# Patient Record
Sex: FEMALE | Race: WHITE | NOT HISPANIC OR LATINO | Employment: FULL TIME | URBAN - METROPOLITAN AREA
[De-identification: names, ages, dates, MRNs, and addresses within clinical notes are randomized per-mention and may not be internally consistent; named-entity substitution may affect disease eponyms.]

---

## 2017-01-27 ENCOUNTER — OFFICE VISIT (OUTPATIENT)
Dept: FAMILY MEDICINE CLINIC | Facility: CLINIC | Age: 37
End: 2017-01-27

## 2017-01-27 VITALS
TEMPERATURE: 98 F | HEART RATE: 75 BPM | DIASTOLIC BLOOD PRESSURE: 85 MMHG | HEIGHT: 66 IN | BODY MASS INDEX: 38.31 KG/M2 | WEIGHT: 238.4 LBS | OXYGEN SATURATION: 99 % | SYSTOLIC BLOOD PRESSURE: 148 MMHG

## 2017-01-27 DIAGNOSIS — K58.0 IRRITABLE BOWEL SYNDROME WITH DIARRHEA: Primary | ICD-10-CM

## 2017-01-27 DIAGNOSIS — F41.9 ANXIETY: ICD-10-CM

## 2017-01-27 PROCEDURE — 99213 OFFICE O/P EST LOW 20 MIN: CPT | Performed by: FAMILY MEDICINE

## 2017-01-27 NOTE — MR AVS SNAPSHOT
Carole Weaver   1/27/2017 11:50 AM   Office Visit    Dept Phone:  [phone]   Encounter #:  [id number]    Provider:  Minesh Leone MD   Department:  [facility]                Your Full Care Plan              Today's Medication Changes          These changes are accurate as of: 1/27/17  1:34 PM.  If you have any questions, ask your nurse or doctor.               Stop taking medication(s)listed here:     metroNIDAZOLE 500 MG tablet   Commonly known as:  FLAGYL   Stopped by:  Minesh Leone MD                      Your Updated Medication List          This list is accurate as of: 1/27/17  1:34 PM.  Always use your most recent med list.                buPROPion  MG 24 hr tablet   Commonly known as:  WELLBUTRIN XL   Take 1 tablet by mouth daily.       clonazePAM 0.5 MG tablet   Commonly known as:  KLONOPIN   Take 1 tablet by mouth 2 (two) times a day as needed for seizures.       dicyclomine 20 MG tablet   Commonly known as:  BENTYL   Take 1 tablet by mouth every 6 (six) hours.       escitalopram 10 MG tablet   Commonly known as:  LEXAPRO   TAKE ONE TABLET BY MOUTH DAILY               We Performed the Following     Ambulatory Referral to Gastroenterology     Ambulatory Referral to Gastroenterology       You Were Diagnosed With        Codes Comments    Irritable bowel syndrome with diarrhea    -  Primary ICD-10-CM: K58.0  ICD-9-CM: 564.1     Anxiety     ICD-10-CM: F41.9  ICD-9-CM: 300.00       Instructions     None    Patient Instructions History      Upcoming Appointments     Visit Type Date Time Department    FOLLOW UP 1/27/2017 11:50 AM [facility]    FOLLOW UP 5/5/2017  1:30 PM [facility]INOD [facility]iggins Signup     [facility] [employer] allows you to send messages to your doctor, view your test results, renew your prescriptions, schedule appointments, and more. To sign up, go to [employer] and click on the  "Sign Up Now link in the New User? box. Enter your [employer] Activation Code exactly as it appears below along with the last four digits of your Social Security Number and your Date of Birth () to complete the sign-up process. If you do not sign up before the expiration date, you must request a new code.    [employer] Activation Code: [email or web address]  Expires: 2/10/2017  1:34 PM    If you have questions, you can email [email or web address] or call [phone] to talk to our [employer] staff. Remember, [employer] is NOT to be used for urgent needs. For medical emergencies, dial 911.               Other Info from Your Visit           Your Appointments     May 05, 2017  1:30 PM EDT   Follow Up with Minesh Leone MD   [facility] MED[facility] MEDICINE (--)    [address] [place] [id number] [phone]           Arrive 15 minutes prior to appointment.              Allergies     No Known Allergies      Reason for Visit     Med Refill -happy with Lexapro-still use Klonopin prn    Irritable Bowel Syndrome     Rectal Bleeding every BM with blood and mucous      Vital Signs     Blood Pressure Pulse Temperature Height Weight Oxygen Saturation    148/85 (BP Location: Left arm, Patient Position: Sitting, Cuff Size: Large Adult) 75 98 °F (36.7 °C) (Oral) 66\" (167.6 cm) 238 lb 6.4 oz (108 kg) 99%    Body Mass Index Smoking Status                38.48 kg/m2 Former Smoker          Problems and Diagnoses Noted     Anxiety problem    Spasmodic colon        "

## 2017-01-27 NOTE — PROGRESS NOTES
Chief Complaint and HPI    I have reviewed the patient's medical history in detail and updated the computerized patient record.    Subjective: Carole Weaver is a 36 y.o. female presents   here today for     Med Refill (-happy with Lexapro-still use Klonopin prn); Irritable Bowel Syndrome; and Rectal Bleeding (every BM with blood and mucous)      Review of Systems   Constitutional: Negative.    HENT: Negative.    Eyes: Negative.    Respiratory: Negative.    Cardiovascular: Negative.    Gastrointestinal: Negative.    Endocrine: Negative.    Genitourinary: Negative.    Musculoskeletal: Negative.    Skin: Negative.    Allergic/Immunologic: Negative.    Neurological: Negative.    Hematological: Negative.    Psychiatric/Behavioral: Negative.        Physical Exam   Constitutional: She is oriented to person, place, and time. She appears well-developed and well-nourished.   Cardiovascular: Normal rate, regular rhythm, normal heart sounds and intact distal pulses.    Pulmonary/Chest: Effort normal.   Neurological: She is alert and oriented to person, place, and time.   Nursing note and vitals reviewed.      Procedures    Assessment:   Diagnosis Plan   1. Irritable bowel syndrome with diarrhea  Ambulatory Referral to Gastroenterology    Ambulatory Referral to Gastroenterology   2. Anxiety         Plan:  Orders Placed This Encounter   Procedures   • Ambulatory Referral to Gastroenterology     Referral Priority:   Routine     Referral Type:   Consultation     Referral Reason:   Specialty Services Required     Requested Specialty:   Gastroenterology     Number of Visits Requested:   1   • Ambulatory Referral to Gastroenterology     Referral Priority:   Routine     Referral Type:   Consultation     Referral Reason:   Specialty Services Required     Referred to Provider:   Roland Thorpe MD     Requested Specialty:   Gastroenterology     Number of Visits Requested:   1       Requested Prescriptions      No prescriptions  requested or ordered in this encounter       All tests and consults since last visit reviewed with patient    Return in about 3 months (around 4/27/2017) for Recheck.

## 2017-02-07 RX ORDER — BUPROPION HYDROCHLORIDE 150 MG/1
TABLET ORAL
Qty: 30 TABLET | Refills: 2 | Status: SHIPPED | OUTPATIENT
Start: 2017-02-07 | End: 2017-05-05 | Stop reason: SDUPTHER

## 2017-03-02 ENCOUNTER — OFFICE (OUTPATIENT)
Dept: URBAN - METROPOLITAN AREA OTHER 6 | Facility: OTHER | Age: 37
End: 2017-03-02
Payer: COMMERCIAL

## 2017-03-02 VITALS
SYSTOLIC BLOOD PRESSURE: 158 MMHG | HEART RATE: 96 BPM | WEIGHT: 242 LBS | HEIGHT: 66 IN | DIASTOLIC BLOOD PRESSURE: 84 MMHG

## 2017-03-02 DIAGNOSIS — Z83.71 FAMILY HISTORY OF COLONIC POLYPS: ICD-10-CM

## 2017-03-02 DIAGNOSIS — K58.9 IRRITABLE BOWEL SYNDROME WITHOUT DIARRHEA: ICD-10-CM

## 2017-03-02 PROCEDURE — 99202 OFFICE O/P NEW SF 15 MIN: CPT | Performed by: INTERNAL MEDICINE

## 2017-04-10 RX ORDER — ESCITALOPRAM OXALATE 10 MG/1
10 TABLET ORAL DAILY
Qty: 30 TABLET | Refills: 2 | Status: SHIPPED | OUTPATIENT
Start: 2017-04-10 | End: 2017-04-10 | Stop reason: SDUPTHER

## 2017-04-10 RX ORDER — ESCITALOPRAM OXALATE 10 MG/1
10 TABLET ORAL DAILY
Qty: 30 TABLET | Refills: 0 | Status: SHIPPED | OUTPATIENT
Start: 2017-04-10 | End: 2017-05-05 | Stop reason: SDUPTHER

## 2017-05-05 ENCOUNTER — OFFICE VISIT (OUTPATIENT)
Dept: FAMILY MEDICINE CLINIC | Facility: CLINIC | Age: 37
End: 2017-05-05

## 2017-05-05 VITALS
WEIGHT: 249.6 LBS | TEMPERATURE: 98.3 F | DIASTOLIC BLOOD PRESSURE: 84 MMHG | HEART RATE: 92 BPM | SYSTOLIC BLOOD PRESSURE: 148 MMHG | HEIGHT: 66 IN | OXYGEN SATURATION: 98 % | BODY MASS INDEX: 40.11 KG/M2

## 2017-05-05 DIAGNOSIS — F41.9 ANXIETY: ICD-10-CM

## 2017-05-05 DIAGNOSIS — K58.0 IRRITABLE BOWEL SYNDROME WITH DIARRHEA: Primary | ICD-10-CM

## 2017-05-05 PROCEDURE — 99213 OFFICE O/P EST LOW 20 MIN: CPT | Performed by: FAMILY MEDICINE

## 2017-05-05 RX ORDER — ESCITALOPRAM OXALATE 10 MG/1
10 TABLET ORAL DAILY
Qty: 30 TABLET | Refills: 6 | Status: SHIPPED | OUTPATIENT
Start: 2017-05-05 | End: 2018-04-25 | Stop reason: SDUPTHER

## 2017-05-05 RX ORDER — DICYCLOMINE HCL 20 MG
20 TABLET ORAL EVERY 6 HOURS
Qty: 120 TABLET | Refills: 6 | Status: SHIPPED | OUTPATIENT
Start: 2017-05-05 | End: 2018-05-10 | Stop reason: SDUPTHER

## 2017-05-05 RX ORDER — BUPROPION HYDROCHLORIDE 150 MG/1
150 TABLET ORAL EVERY MORNING
Qty: 30 TABLET | Refills: 6 | Status: SHIPPED | OUTPATIENT
Start: 2017-05-05 | End: 2018-04-25 | Stop reason: SDUPTHER

## 2017-05-05 RX ORDER — CLONAZEPAM 0.5 MG/1
0.5 TABLET ORAL 2 TIMES DAILY PRN
Qty: 60 TABLET | Refills: 0 | Status: SHIPPED | OUTPATIENT
Start: 2017-05-05 | End: 2017-11-10 | Stop reason: SDUPTHER

## 2017-11-10 ENCOUNTER — OFFICE VISIT (OUTPATIENT)
Dept: FAMILY MEDICINE CLINIC | Facility: CLINIC | Age: 37
End: 2017-11-10

## 2017-11-10 VITALS
DIASTOLIC BLOOD PRESSURE: 80 MMHG | BODY MASS INDEX: 40.76 KG/M2 | WEIGHT: 253.6 LBS | OXYGEN SATURATION: 97 % | HEART RATE: 89 BPM | TEMPERATURE: 98.3 F | SYSTOLIC BLOOD PRESSURE: 132 MMHG | HEIGHT: 66 IN

## 2017-11-10 DIAGNOSIS — F41.9 ANXIETY: Primary | ICD-10-CM

## 2017-11-10 PROCEDURE — 99213 OFFICE O/P EST LOW 20 MIN: CPT | Performed by: NURSE PRACTITIONER

## 2017-11-10 RX ORDER — CLONAZEPAM 0.5 MG/1
0.5 TABLET ORAL 2 TIMES DAILY PRN
Qty: 60 TABLET | Refills: 0 | OUTPATIENT
Start: 2017-11-10 | End: 2018-05-10

## 2017-11-10 NOTE — PROGRESS NOTES
Subjective   Carole Weaver is a 37 y.o. female presents for routine follow up for anxiety and IBS. Taking klonopin as needed. Denies side effects of current medication. States one prescription typically lasts 6 months. Also taking lexapro and bentyl for IBS. Reports decreased symptoms of IBS but feels comfortable knowing she has bentyl available.    Anxiety   Presents for follow-up visit. Patient reports no chest pain, compulsions, confusion, decreased concentration, depressed mood, dizziness, dry mouth, excessive worry, feeling of choking, hyperventilation, impotence, insomnia, irritability, malaise, muscle tension, nausea, nervous/anxious behavior, obsessions, palpitations, panic, restlessness, shortness of breath or suicidal ideas. Symptoms occur occasionally. The severity of symptoms is mild. The quality of sleep is good. Nighttime awakenings: none.     Compliance with medications is 0-25%.        The following portions of the patient's history were reviewed and updated as appropriate: allergies, current medications, past family history, past medical history, past social history, past surgical history and problem list.    Review of Systems   Constitutional: Negative.  Negative for irritability.   HENT: Negative.    Eyes: Negative.    Respiratory: Negative.  Negative for shortness of breath.    Cardiovascular: Negative.  Negative for chest pain and palpitations.   Gastrointestinal: Negative.  Negative for nausea.   Endocrine: Negative.    Genitourinary: Negative.  Negative for impotence.   Musculoskeletal: Negative.    Skin: Negative.    Allergic/Immunologic: Negative.    Neurological: Negative.  Negative for dizziness.   Hematological: Negative.    Psychiatric/Behavioral: Negative.  Negative for confusion, decreased concentration and suicidal ideas. The patient is not nervous/anxious and does not have insomnia.        Objective   Physical Exam   Constitutional: She is oriented to person, place, and time. She  appears well-developed and well-nourished.   Neck: Neck supple.   Cardiovascular: Normal rate, regular rhythm and normal heart sounds.  Exam reveals no gallop and no friction rub.    No murmur heard.  Pulmonary/Chest: Effort normal and breath sounds normal. No respiratory distress. She has no wheezes. She has no rales.   Abdominal: Soft. Bowel sounds are normal. She exhibits no distension. There is no tenderness.   Neurological: She is alert and oriented to person, place, and time.   Skin: Skin is warm and dry.   Psychiatric: She has a normal mood and affect.   Vitals reviewed.      Assessment/Plan   Carole was seen today for anxiety.    Diagnoses and all orders for this visit:    Anxiety

## 2018-04-26 RX ORDER — ESCITALOPRAM OXALATE 10 MG/1
TABLET ORAL
Qty: 30 TABLET | Refills: 5 | Status: SHIPPED | OUTPATIENT
Start: 2018-04-26 | End: 2018-05-10 | Stop reason: SDUPTHER

## 2018-04-26 RX ORDER — BUPROPION HYDROCHLORIDE 150 MG/1
TABLET ORAL
Qty: 30 TABLET | Refills: 5 | Status: SHIPPED | OUTPATIENT
Start: 2018-04-26 | End: 2018-05-10 | Stop reason: SDUPTHER

## 2018-05-10 ENCOUNTER — OFFICE VISIT (OUTPATIENT)
Dept: INTERNAL MEDICINE | Facility: CLINIC | Age: 38
End: 2018-05-10

## 2018-05-10 VITALS
HEIGHT: 66 IN | WEIGHT: 258 LBS | SYSTOLIC BLOOD PRESSURE: 130 MMHG | DIASTOLIC BLOOD PRESSURE: 90 MMHG | BODY MASS INDEX: 41.46 KG/M2 | HEART RATE: 85 BPM | TEMPERATURE: 97.2 F | OXYGEN SATURATION: 98 % | RESPIRATION RATE: 16 BRPM

## 2018-05-10 DIAGNOSIS — K58.0 IRRITABLE BOWEL SYNDROME WITH DIARRHEA: ICD-10-CM

## 2018-05-10 DIAGNOSIS — F41.9 ANXIETY: Primary | ICD-10-CM

## 2018-05-10 PROCEDURE — 99213 OFFICE O/P EST LOW 20 MIN: CPT | Performed by: FAMILY MEDICINE

## 2018-05-10 RX ORDER — ESCITALOPRAM OXALATE 20 MG/1
20 TABLET ORAL DAILY
Qty: 90 TABLET | Refills: 3 | Status: SHIPPED | OUTPATIENT
Start: 2018-05-10 | End: 2019-05-09 | Stop reason: SDUPTHER

## 2018-05-10 RX ORDER — CLONAZEPAM 0.5 MG/1
0.5 TABLET ORAL 2 TIMES DAILY PRN
Qty: 60 TABLET | Refills: 0 | Status: SHIPPED | OUTPATIENT
Start: 2018-05-10 | End: 2018-09-25 | Stop reason: SDUPTHER

## 2018-05-10 RX ORDER — DICYCLOMINE HCL 20 MG
20 TABLET ORAL EVERY 6 HOURS
Qty: 120 TABLET | Refills: 6 | Status: SHIPPED | OUTPATIENT
Start: 2018-05-10 | End: 2019-05-09 | Stop reason: SDUPTHER

## 2018-05-10 RX ORDER — BUPROPION HYDROCHLORIDE 150 MG/1
150 TABLET ORAL EVERY MORNING
Qty: 30 TABLET | Refills: 5 | Status: SHIPPED | OUTPATIENT
Start: 2018-05-10 | End: 2018-11-08

## 2018-05-10 NOTE — PROGRESS NOTES
CC:IBS,anxiety-depression    Subjective.../HPI  Patient present today with1) IBS -1-3xs/da- caffein intake-3  0f 12  2) anxiety-Klonopin lasted 6 mons.  3)de-pression good but ? [facility] re traveling  I have reviewed the patient's medical history in detail and updated the computerized patient record.    Past Medical History:   Diagnosis Date   • Anxiety    • History of TB skin testing 01/17/2009    TB Skin Test   • Irritable bowel syndrome    • Lumbar disc disease    • Right leg pain        Past Surgical History:   Procedure Laterality Date   • CHOLECYSTECTOMY     • PAP SMEAR  01/24/2014       Family History   Problem Relation Age of Onset   • Hyperlipidemia Mother    • No Known Problems Father        Social History     Social History   • Marital status:      Spouse name: N/A   • Number of children: N/A   • Years of education: N/A     Occupational History   • Not on file.     Social History Main Topics   • Smoking status: Former Smoker     Types: Electronic Cigarette   • Smokeless tobacco: Never Used   • Alcohol use No   • Drug use: No   • Sexual activity: Defer     Other Topics Concern   • Not on file     Social History Narrative   • No narrative on file         There is no immunization history on file for this patient.    Review of Systems:   Review of Systems   Constitutional: Negative.    HENT: Negative.    Eyes: Negative.    Respiratory: Negative.    Cardiovascular: Negative.    Gastrointestinal: Negative.    Endocrine: Negative.    Genitourinary: Negative.    Musculoskeletal: Negative.    Skin: Negative.    Allergic/Immunologic: Negative.    Neurological: Negative.    Hematological: Negative.    Psychiatric/Behavioral: Negative.          Physical Exam   Constitutional: She is oriented to person, place, and time. She appears well-developed and well-nourished.   Cardiovascular: Normal rate and regular rhythm.    Pulmonary/Chest: Effort normal and breath sounds normal.   Neurological: She is alert and oriented  "to person, place, and time.   Psychiatric: She has a normal mood and affect. Her behavior is normal.         Vital Signs     Vitals:    05/10/18 1656   BP: 130/90   BP Location: Left arm   Patient Position: Sitting   Cuff Size: Large Adult   Pulse: 85   Resp: 16   Temp: 97.2 °F (36.2 °C)   TempSrc: Tympanic   SpO2: 98%   Weight: 117 kg (258 lb)   Height: 167.6 cm (66\")          Results Review:      REVIEWED AND DISCUSSED CLINICAL RESULTS WITH PATIENT      Requested Prescriptions     Signed Prescriptions Disp Refills   • escitalopram (LEXAPRO) 20 MG tablet 90 tablet 3     Sig: Take 1 tablet by mouth Daily.   • dicyclomine (BENTYL) 20 MG tablet 120 tablet 6     Sig: Take 1 tablet by mouth Every 6 (Six) Hours.   • clonazePAM (KLONOPIN) 0.5 MG tablet 60 tablet 0     Sig: Take 1 tablet by mouth 2 (Two) Times a Day As Needed for Seizures.   • buPROPion XL (WELLBUTRIN XL) 150 MG 24 hr tablet 30 tablet 5     Sig: Take 1 tablet by mouth Every Morning.         Current Outpatient Prescriptions:   •  buPROPion XL (WELLBUTRIN XL) 150 MG 24 hr tablet, Take 1 tablet by mouth Every Morning., Disp: 30 tablet, Rfl: 5  •  clonazePAM (KLONOPIN) 0.5 MG tablet, Take 1 tablet by mouth 2 (Two) Times a Day As Needed for Seizures., Disp: 60 tablet, Rfl: 0  •  dicyclomine (BENTYL) 20 MG tablet, Take 1 tablet by mouth Every 6 (Six) Hours., Disp: 120 tablet, Rfl: 6  •  escitalopram (LEXAPRO) 20 MG tablet, Take 1 tablet by mouth Daily., Disp: 90 tablet, Rfl: 3    Procedures          Diagnoses and all orders for this visit:    Anxiety    Irritable bowel syndrome with diarrhea    Other orders  -     escitalopram (LEXAPRO) 20 MG tablet; Take 1 tablet by mouth Daily.  -     dicyclomine (BENTYL) 20 MG tablet; Take 1 tablet by mouth Every 6 (Six) Hours.  -     clonazePAM (KLONOPIN) 0.5 MG tablet; Take 1 tablet by mouth 2 (Two) Times a Day As Needed for Seizures.  -     buPROPion XL (WELLBUTRIN XL) 150 MG 24 hr tablet; Take 1 tablet by mouth Every " Morning.         Return in about 6 months (around 11/10/2018) for Recheck.    Minesh Leone M.D  05/10/18  5:44 PM

## 2018-09-25 RX ORDER — CLONAZEPAM 0.5 MG/1
0.5 TABLET ORAL 2 TIMES DAILY PRN
Qty: 60 TABLET | Refills: 1 | Status: SHIPPED | OUTPATIENT
Start: 2018-09-25 | End: 2019-05-09 | Stop reason: SDUPTHER

## 2018-11-08 ENCOUNTER — OFFICE VISIT (OUTPATIENT)
Dept: INTERNAL MEDICINE | Facility: CLINIC | Age: 38
End: 2018-11-08

## 2018-11-08 VITALS
HEIGHT: 66 IN | TEMPERATURE: 97.9 F | OXYGEN SATURATION: 98 % | HEART RATE: 89 BPM | DIASTOLIC BLOOD PRESSURE: 95 MMHG | SYSTOLIC BLOOD PRESSURE: 154 MMHG

## 2018-11-08 DIAGNOSIS — F41.9 ANXIETY: ICD-10-CM

## 2018-11-08 DIAGNOSIS — K58.0 IRRITABLE BOWEL SYNDROME WITH DIARRHEA: ICD-10-CM

## 2018-11-08 DIAGNOSIS — Z23 IMMUNIZATION DUE: Primary | ICD-10-CM

## 2018-11-08 PROCEDURE — 90674 CCIIV4 VAC NO PRSV 0.5 ML IM: CPT | Performed by: FAMILY MEDICINE

## 2018-11-08 PROCEDURE — 90471 IMMUNIZATION ADMIN: CPT | Performed by: FAMILY MEDICINE

## 2018-11-08 PROCEDURE — 99212 OFFICE O/P EST SF 10 MIN: CPT | Performed by: FAMILY MEDICINE

## 2018-11-08 NOTE — PROGRESS NOTES
CC:anxiety,depression,IBS    Subjective.../HPI  Patient present today with1) IBS-Bentyl good  2) anxiety- Klonopin 3-4 times/week  I have reviewed the patient's medical history in detail and updated the computerized patient record.    Past Medical History:   Diagnosis Date   • Anxiety    • History of TB skin testing 01/17/2009    TB Skin Test   • Irritable bowel syndrome    • Lumbar disc disease    • Right leg pain        Past Surgical History:   Procedure Laterality Date   • CHOLECYSTECTOMY     • PAP SMEAR  01/24/2014       Family History   Problem Relation Age of Onset   • Hyperlipidemia Mother    • No Known Problems Father        Social History     Socioeconomic History   • Marital status:      Spouse name: Not on file   • Number of children: Not on file   • Years of education: Not on file   • Highest education level: Not on file   Social Needs   • Financial resource strain: Not on file   • Food insecurity - worry: Not on file   • Food insecurity - inability: Not on file   • Transportation needs - medical: Not on file   • Transportation needs - non-medical: Not on file   Occupational History   • Not on file   Tobacco Use   • Smoking status: Former Smoker     Types: Electronic Cigarette   • Smokeless tobacco: Never Used   Substance and Sexual Activity   • Alcohol use: No   • Drug use: No   • Sexual activity: Defer   Other Topics Concern   • Not on file   Social History Narrative   • Not on file       Most Recent Immunizations   Administered Date(s) Administered   • flucelvax quad pfs =>4 YRS 11/08/2018       Review of Systems:   Review of Systems   Constitutional: Negative.    HENT: Negative.    Eyes: Negative.    Respiratory: Negative.    Cardiovascular: Negative.    Gastrointestinal: Negative.    Endocrine: Negative.    Genitourinary: Negative.    Musculoskeletal: Negative.    Skin: Negative.    Allergic/Immunologic: Negative.    Neurological: Negative.    Hematological: Negative.   "  Psychiatric/Behavioral: Negative.          Physical Exam   Constitutional: She is oriented to person, place, and time. She appears well-developed and well-nourished.   Cardiovascular: Normal rate, regular rhythm and normal heart sounds.    Pulmonary/Chest: Effort normal and breath sounds normal.   Neurological: She is alert and oriented to person, place, and time.   Skin: Skin is warm and dry.   Psychiatric: She has a normal mood and affect. Her behavior is normal.   Vitals reviewed.        Vital Signs     Vitals:    11/08/18 1729   BP: 154/95   BP Location: Left arm   Patient Position: Sitting   Cuff Size: Large Adult   Pulse: 89   Temp: 97.9 °F (36.6 °C)   TempSrc: Oral   SpO2: 98%   Height: 167.6 cm (66\")          Results Review:      REVIEWED AND DISCUSSED CLINICAL RESULTS WITH PATIENT      Requested Prescriptions      No prescriptions requested or ordered in this encounter         Current Outpatient Medications:   •  clonazePAM (KLONOPIN) 0.5 MG tablet, Take 1 tablet by mouth 2 (Two) Times a Day As Needed for Seizures., Disp: 60 tablet, Rfl: 1  •  dicyclomine (BENTYL) 20 MG tablet, Take 1 tablet by mouth Every 6 (Six) Hours., Disp: 120 tablet, Rfl: 6  •  escitalopram (LEXAPRO) 20 MG tablet, Take 1 tablet by mouth Daily., Disp: 90 tablet, Rfl: 3    Procedures          Diagnoses and all orders for this visit:    Immunization due  -     Cancel: Fluarix/Fluzone/Afluria Quad/FluLaval Quad  -     Flucelvax Quad=>4Years (PFS)    Anxiety    Irritable bowel syndrome with diarrhea         Return in about 6 months (around 5/8/2019) for Recheck.    Minesh Leone M.D  11/20/18  9:54 AM          "

## 2018-11-10 ENCOUNTER — RESULTS ENCOUNTER (OUTPATIENT)
Dept: INTERNAL MEDICINE | Facility: CLINIC | Age: 38
End: 2018-11-10

## 2018-11-10 DIAGNOSIS — Z00.00 ROUTINE CHECK-UP: ICD-10-CM

## 2019-05-09 ENCOUNTER — OFFICE VISIT (OUTPATIENT)
Dept: INTERNAL MEDICINE | Facility: CLINIC | Age: 39
End: 2019-05-09

## 2019-05-09 VITALS
WEIGHT: 245 LBS | HEIGHT: 66 IN | DIASTOLIC BLOOD PRESSURE: 90 MMHG | TEMPERATURE: 98 F | HEART RATE: 98 BPM | BODY MASS INDEX: 39.37 KG/M2 | SYSTOLIC BLOOD PRESSURE: 132 MMHG | OXYGEN SATURATION: 96 %

## 2019-05-09 DIAGNOSIS — K58.8 OTHER IRRITABLE BOWEL SYNDROME: ICD-10-CM

## 2019-05-09 DIAGNOSIS — F41.9 ANXIETY: Primary | ICD-10-CM

## 2019-05-09 DIAGNOSIS — F33.9 MONOPOLAR DEPRESSION (HCC): ICD-10-CM

## 2019-05-09 PROCEDURE — 99213 OFFICE O/P EST LOW 20 MIN: CPT | Performed by: FAMILY MEDICINE

## 2019-05-09 RX ORDER — ESCITALOPRAM OXALATE 20 MG/1
20 TABLET ORAL DAILY
Qty: 90 TABLET | Refills: 3 | Status: SHIPPED | OUTPATIENT
Start: 2019-05-09 | End: 2020-08-12 | Stop reason: SDUPTHER

## 2019-05-09 RX ORDER — DICYCLOMINE HCL 20 MG
20 TABLET ORAL EVERY 6 HOURS
Qty: 120 TABLET | Refills: 6 | Status: SHIPPED | OUTPATIENT
Start: 2019-05-09 | End: 2019-11-25

## 2019-05-09 RX ORDER — CLONAZEPAM 0.5 MG/1
0.5 TABLET ORAL 2 TIMES DAILY PRN
Qty: 60 TABLET | Refills: 5 | Status: SHIPPED | OUTPATIENT
Start: 2019-05-09 | End: 2019-11-14 | Stop reason: SDUPTHER

## 2019-05-09 NOTE — PROGRESS NOTES
CC:ANXIETY,DEPRESSION,ibs    Subjective.../HPI  Patient present today with ANXIETY-DEPRESSION WELL CONTROLLED WITH CURRENT MEDS  ibs GREAT- 1-2 bENTYL /DAY  I have reviewed the patient's medical history in detail and updated the computerized patient record.    Past Medical History:   Diagnosis Date   • Anxiety    • History of TB skin testing 01/17/2009    TB Skin Test   • Irritable bowel syndrome    • Lumbar disc disease    • Right leg pain        Past Surgical History:   Procedure Laterality Date   • CHOLECYSTECTOMY     • PAP SMEAR  01/24/2014       Family History   Problem Relation Age of Onset   • Hyperlipidemia Mother    • No Known Problems Father        Social History     Socioeconomic History   • Marital status:      Spouse name: Not on file   • Number of children: Not on file   • Years of education: Not on file   • Highest education level: Not on file   Tobacco Use   • Smoking status: Former Smoker     Types: Electronic Cigarette   • Smokeless tobacco: Never Used   Substance and Sexual Activity   • Alcohol use: No   • Drug use: No   • Sexual activity: Defer       Most Recent Immunizations   Administered Date(s) Administered   • flucelvax quad pfs =>4 YRS 11/08/2018       Review of Systems:   Review of Systems   HENT: Positive for ear pain.    All other systems reviewed and are negative.        Physical Exam   Constitutional: She is oriented to person, place, and time. She appears well-developed and well-nourished.   Cardiovascular: Normal rate, regular rhythm and normal heart sounds.   Pulmonary/Chest: Stridor present. She is in respiratory distress.   Neurological: She is alert and oriented to person, place, and time.   Psychiatric: She has a normal mood and affect. Her behavior is normal. Thought content normal.   Vitals reviewed.        Vital Signs     Vitals:    05/09/19 1439   BP: 132/90   BP Location: Left arm   Patient Position: Sitting   Cuff Size: Large Adult   Pulse: 98   Temp: 98 °F (36.7  "°C)   TempSrc: Oral   SpO2: 96%   Weight: 111 kg (245 lb)   Height: 167.6 cm (66\")          Results Review:      REVIEWED AND DISCUSSED CLINICAL RESULTS WITH PATIENT      Requested Prescriptions     Signed Prescriptions Disp Refills   • clonazePAM (KLONOPIN) 0.5 MG tablet 60 tablet 5     Sig: Take 1 tablet by mouth 2 (Two) Times a Day As Needed for Seizures.   • dicyclomine (BENTYL) 20 MG tablet 120 tablet 6     Sig: Take 1 tablet by mouth Every 6 (Six) Hours.   • escitalopram (LEXAPRO) 20 MG tablet 90 tablet 3     Sig: Take 1 tablet by mouth Daily.         Current Outpatient Medications:   •  clonazePAM (KLONOPIN) 0.5 MG tablet, Take 1 tablet by mouth 2 (Two) Times a Day As Needed for Seizures., Disp: 60 tablet, Rfl: 5  •  dicyclomine (BENTYL) 20 MG tablet, Take 1 tablet by mouth Every 6 (Six) Hours., Disp: 120 tablet, Rfl: 6  •  escitalopram (LEXAPRO) 20 MG tablet, Take 1 tablet by mouth Daily., Disp: 90 tablet, Rfl: 3    Procedures          Diagnoses and all orders for this visit:    Anxiety  -     clonazePAM (KLONOPIN) 0.5 MG tablet; Take 1 tablet by mouth 2 (Two) Times a Day As Needed for Seizures.    Other irritable bowel syndrome  -     dicyclomine (BENTYL) 20 MG tablet; Take 1 tablet by mouth Every 6 (Six) Hours.    Monopolar depression (CMS/HCC)  -     escitalopram (LEXAPRO) 20 MG tablet; Take 1 tablet by mouth Daily.        There are no Patient Instructions on file for this visit.     Return in about 6 months (around 11/9/2019) for Recheck.    Minesh Leone M.D  05/09/19  3:11 PM          "

## 2019-06-24 ENCOUNTER — OFFICE (OUTPATIENT)
Dept: URBAN - METROPOLITAN AREA CLINIC 66 | Facility: CLINIC | Age: 39
End: 2019-06-24
Payer: COMMERCIAL

## 2019-06-24 VITALS
WEIGHT: 242 LBS | HEIGHT: 66 IN | HEART RATE: 96 BPM | DIASTOLIC BLOOD PRESSURE: 76 MMHG | SYSTOLIC BLOOD PRESSURE: 120 MMHG

## 2019-06-24 DIAGNOSIS — K58.0 IRRITABLE BOWEL SYNDROME WITH DIARRHEA: ICD-10-CM

## 2019-06-24 DIAGNOSIS — K92.1 MELENA: ICD-10-CM

## 2019-06-24 DIAGNOSIS — K52.9 NONINFECTIVE GASTROENTERITIS AND COLITIS, UNSPECIFIED: ICD-10-CM

## 2019-06-24 DIAGNOSIS — K64.4 RESIDUAL HEMORRHOIDAL SKIN TAGS: ICD-10-CM

## 2019-06-24 DIAGNOSIS — Z83.71 FAMILY HISTORY OF COLONIC POLYPS: ICD-10-CM

## 2019-06-24 DIAGNOSIS — R63.4 ABNORMAL WEIGHT LOSS: ICD-10-CM

## 2019-06-24 DIAGNOSIS — F17.200 NICOTINE DEPENDENCE, UNSPECIFIED, UNCOMPLICATED: ICD-10-CM

## 2019-06-24 PROCEDURE — 99214 OFFICE O/P EST MOD 30 MIN: CPT | Performed by: NURSE PRACTITIONER

## 2019-06-24 RX ORDER — HYDROCORTISONE ACETATE 25 MG/1
25 SUPPOSITORY RECTAL
Qty: 14 | Refills: 1 | Status: ACTIVE
Start: 2019-06-24

## 2019-07-08 ENCOUNTER — AMBULATORY SURGICAL CENTER (OUTPATIENT)
Dept: URBAN - METROPOLITAN AREA SURGERY 20 | Facility: SURGERY | Age: 39
End: 2019-07-08

## 2019-07-08 ENCOUNTER — OFFICE (OUTPATIENT)
Dept: URBAN - METROPOLITAN AREA PATHOLOGY 4 | Facility: PATHOLOGY | Age: 39
End: 2019-07-08

## 2019-07-08 VITALS — HEIGHT: 66 IN

## 2019-07-08 DIAGNOSIS — C20 MALIGNANT NEOPLASM OF RECTUM: ICD-10-CM

## 2019-07-08 DIAGNOSIS — D12.3 BENIGN NEOPLASM OF TRANSVERSE COLON: ICD-10-CM

## 2019-07-08 DIAGNOSIS — D12.7 BENIGN NEOPLASM OF RECTOSIGMOID JUNCTION: ICD-10-CM

## 2019-07-08 DIAGNOSIS — K52.9 NONINFECTIVE GASTROENTERITIS AND COLITIS, UNSPECIFIED: ICD-10-CM

## 2019-07-08 DIAGNOSIS — K58.9 IRRITABLE BOWEL SYNDROME WITHOUT DIARRHEA: ICD-10-CM

## 2019-07-08 DIAGNOSIS — K56.600 PARTIAL INTESTINAL OBSTRUCTION, UNSPECIFIED AS TO CAUSE: ICD-10-CM

## 2019-07-08 DIAGNOSIS — K92.1 MELENA: ICD-10-CM

## 2019-07-08 DIAGNOSIS — K31.89 OTHER DISEASES OF STOMACH AND DUODENUM: ICD-10-CM

## 2019-07-08 DIAGNOSIS — K21.0 GASTRO-ESOPHAGEAL REFLUX DISEASE WITH ESOPHAGITIS: ICD-10-CM

## 2019-07-08 DIAGNOSIS — Z83.71 FAMILY HISTORY OF COLONIC POLYPS: ICD-10-CM

## 2019-07-08 PROBLEM — K29.70 GASTRITIS, UNSPECIFIED, WITHOUT BLEEDING: Status: ACTIVE | Noted: 2019-07-08

## 2019-07-08 PROBLEM — K92.2 GASTROINTESTINAL HEMORRHAGE, UNSPECIFIED: Status: ACTIVE | Noted: 2019-07-08

## 2019-07-08 PROBLEM — K20.9 ESOPHAGITIS, UNSPECIFIED: Status: ACTIVE | Noted: 2019-07-08

## 2019-07-08 LAB
GI HISTOLOGY: A. SELECT: (no result)
GI HISTOLOGY: B. SELECT: (no result)
GI HISTOLOGY: C. SELECT: (no result)
GI HISTOLOGY: D. SELECT: (no result)
GI HISTOLOGY: E. SELECT: (no result)
GI HISTOLOGY: F. SELECT: (no result)
GI HISTOLOGY: PDF REPORT: (no result)

## 2019-07-08 PROCEDURE — 45385 COLONOSCOPY W/LESION REMOVAL: CPT | Performed by: INTERNAL MEDICINE

## 2019-07-08 PROCEDURE — 45381 COLONOSCOPY SUBMUCOUS NJX: CPT | Mod: 59 | Performed by: INTERNAL MEDICINE

## 2019-07-08 PROCEDURE — 43239 EGD BIOPSY SINGLE/MULTIPLE: CPT | Performed by: INTERNAL MEDICINE

## 2019-07-08 PROCEDURE — 88341 IMHCHEM/IMCYTCHM EA ADD ANTB: CPT | Mod: 26 | Performed by: INTERNAL MEDICINE

## 2019-07-08 PROCEDURE — 45380 COLONOSCOPY AND BIOPSY: CPT | Mod: 59 | Performed by: INTERNAL MEDICINE

## 2019-07-08 PROCEDURE — 88342 IMHCHEM/IMCYTCHM 1ST ANTB: CPT | Mod: 26 | Performed by: INTERNAL MEDICINE

## 2019-07-08 PROCEDURE — 88305 TISSUE EXAM BY PATHOLOGIST: CPT | Performed by: INTERNAL MEDICINE

## 2019-07-08 PROCEDURE — 45382 COLONOSCOPY W/CONTROL BLEED: CPT | Mod: 59 | Performed by: INTERNAL MEDICINE

## 2019-07-08 NOTE — SERVICEHPINOTES
diarrhea, 6 x daily urgency,  afraid to leave house,  ? anxiety component   having scopes to exclude other conditions such as IBD.

## 2019-07-12 ENCOUNTER — OFFICE VISIT (OUTPATIENT)
Dept: SURGERY | Facility: CLINIC | Age: 39
End: 2019-07-12

## 2019-07-12 VITALS
OXYGEN SATURATION: 98 % | TEMPERATURE: 98.2 F | DIASTOLIC BLOOD PRESSURE: 78 MMHG | WEIGHT: 236 LBS | HEIGHT: 66 IN | BODY MASS INDEX: 37.93 KG/M2 | HEART RATE: 105 BPM | SYSTOLIC BLOOD PRESSURE: 128 MMHG

## 2019-07-12 DIAGNOSIS — C20 RECTAL CANCER (HCC): Primary | ICD-10-CM

## 2019-07-12 PROCEDURE — 99244 OFF/OP CNSLTJ NEW/EST MOD 40: CPT | Performed by: COLON & RECTAL SURGERY

## 2019-07-12 NOTE — PROGRESS NOTES
Carole Weaver is a 38 y.o. female who is seen as a consult at the request of Roland Thorpe MD for abnormal colonoscopy    HPI:    Pt states in 2016, she stated noting changes in her bowel habits.  She had lots of fecal urgency, and would occasionally have FI accidents  She saw her PCP, who rx dicyclomine for IBS    Starting in 2019, she began to have 6 BMs per day with lots of blood and mucus.  She had colonoscopy with Dr. Thorpe 2019: found rectal mass and large polyp at rectosigmoid junction    She does not have any abdominal pain or rectal pain.    The urgency is worse.  Had unintentional weight loss.     FamHx: colon polyps and Factor V Leiden mother.  Maternal uncle with CRC.    She has had a lump in her L breast x3 years, managed by her GYN Dr. Cottrell.  Under surveillance with repeat mammo and breast U/S    +hx abnl Pap x1 20 years ago, none since then    Past Medical History:   Diagnosis Date   • Abnormal Pap smear of cervix 1998    JULIUS I   • Anxiety    • Cervical lymphadenitis 2012    SEEN AT [facility] ER   • Chronic diarrhea    • Colon polyps    • Depression    • GERD (gastroesophageal reflux disease)    • Hematochezia    • Hemorrhoids    • History of TB skin testing 2009    TB Skin Test   • HPV in female    • Infected insect bite of neck 2016    SEEN AT [facility]   • Irritable bowel syndrome    • Kidney stones 2007    SEEN AT [facility] ER   • Lumbar disc disease    • PIH (pregnancy induced hypertension)    • Rectal cancer (CMS/[facility]) 2019    INVASIVE MODERATELY DIFFERENTIATED ADENOCARCINOMA   • Right leg pain    • SAB (spontaneous ) 1996     A2-1 INDUCED   • Sepsis due to cellulitis (CMS/[facility]) 2002    LEFT GREAT TOE, ADMITTED TO [facility]       Past Surgical History:   Procedure Laterality Date   • CHOLECYSTECTOMY N/A 10/10/2003    LAPAROSCOPIC WITH CHOLANGIOGRAM, DR. JAMEY TALAVERA AT [facility]   • COLONOSCOPY W/ POLYPECTOMY N/A 2019    15 MM  TUBULOVILLOUS ADENOMA POLYP IN HEPATIC FLEXURE, 20 MMTUBULOVILLOUS ADENOMA WITH HIGH GRADE DYSPLASIA IN RECTOSIGMOID, 4 CM MASS IN MID RECTUM, PATH: INVASIVE MODERATELY DIFFERENTIATED ADENOCARCINOMA, DR. JENNIFER LI AT [facility]   • DILATATION AND EVACUATION N/A 2009   • ENDOSCOPY N/A 07/08/2019    LA GRADE A ESOPHAGITIS, GASTRITIS, ALL BIOPSIES BENIGN, DR. JENNIFER LI AT [facility]   • PAP SMEAR  01/24/2014   • VAGINAL DELIVERY N/A 09/18/1998       Social History:   reports that she has quit smoking. Her smoking use included electronic cigarette. She has a 30.00 pack-year smoking history. She has never used smokeless tobacco. She reports that she drinks alcohol. She reports that she does not use drugs.      Marriage status:     Family History   Problem Relation Age of Onset   • Hyperlipidemia Mother    • Colon polyps Mother    • Heart disease Mother    • No Known Problems Father    • Heart disease Paternal Grandfather    • No Known Problems Son    • Cancer Paternal Uncle    • Colon cancer Paternal Uncle    • Diabetes Paternal Uncle          Current Outpatient Medications:   •  clonazePAM (KLONOPIN) 0.5 MG tablet, Take 1 tablet by mouth 2 (Two) Times a Day As Needed for Seizures., Disp: 60 tablet, Rfl: 5  •  dicyclomine (BENTYL) 20 MG tablet, Take 1 tablet by mouth Every 6 (Six) Hours., Disp: 120 tablet, Rfl: 6  •  escitalopram (LEXAPRO) 20 MG tablet, Take 1 tablet by mouth Daily., Disp: 90 tablet, Rfl: 3    Allergy  Patient has no known allergies.    Review of Systems   Constitution: Negative for decreased appetite, weakness and weight gain.   HENT: Negative for congestion, hearing loss and hoarse voice.    Eyes: Positive for blurred vision. Negative for discharge and visual disturbance.   Cardiovascular: Negative for chest pain, cyanosis and leg swelling.   Respiratory: Positive for snoring. Negative for cough, shortness of breath and sleep disturbances due to breathing.     Endocrine: Positive for cold intolerance. Negative for heat intolerance.   Hematologic/Lymphatic: Bruises/bleeds easily.   Skin: Negative for itching, poor wound healing and skin cancer.   Musculoskeletal: Negative for arthritis, back pain, joint pain and joint swelling.   Gastrointestinal: Positive for change in bowel habit and bowel incontinence. Negative for abdominal pain and constipation.   Genitourinary: Negative for bladder incontinence, dysuria and hematuria.   Neurological: Negative for brief paralysis, excessive daytime sleepiness, dizziness, focal weakness, headaches and light-headedness.   Psychiatric/Behavioral: Negative for altered mental status and hallucinations. The patient does not have insomnia.    Allergic/Immunologic: Negative for HIV exposure and persistent infections.       Vitals:    07/12/19 1456   BP: 128/78   Pulse: 105   Temp: 98.2 °F (36.8 °C)   SpO2: 98%     Body mass index is 38.09 kg/m².    Physical Exam   Constitutional: She is oriented to person, place, and time. She appears well-developed and well-nourished. No distress.   HENT:   Head: Normocephalic and atraumatic.   Nose: Nose normal.   Mouth/Throat: Oropharynx is clear and moist.   Eyes: Conjunctivae and EOM are normal. Pupils are equal, round, and reactive to light.   Neck: Normal range of motion. No tracheal deviation present.   Pulmonary/Chest: Effort normal and breath sounds normal. No respiratory distress.   Abdominal: Soft. She exhibits no distension.   Musculoskeletal: Normal range of motion. She exhibits no edema or deformity.   Neurological: She is alert and oriented to person, place, and time. No cranial nerve deficit. Coordination and gait normal.   Skin: Skin is warm and dry.   Psychiatric: She has a normal mood and affect. Her behavior is normal. Judgment normal.       Review of Medical Record:  I reviewed Dr. Thorpe colonoscopy images, report, and pathology 7/8/2019:  20mm polyp at rectosigmoid junction: TVA with  high-grade dysplasia  Fungating, circumferential 4cm mass at mid-rectum: path adenocarcinoma    Assessment:  1. Rectal cancer (CMS/HCC)        Plan:    For newly diagnosed rectal cancer, she will need staging with CT c/a/p, CEA, flexible sigmoidoscopy and endorectal ultrasound.  Discussion with patient and family that this will determine if she will require neoadjuvant chemoradiation.      Discussed with pt surgery will be a laparoscopic low anterior resection with diverting loop ileostomy.  I discussed with them typical surgical time, hospital recovery, and home recovery.  I described to the patient risks, benefits, and alternatives. I discussed risks of surgery being heart attack, stroke, exacerbation of chronic medical illness, pneumonia, DVT, PTE, infection, bleeding, and injury to other organs during surgery. Patient expresses understanding.      Scribed for Padmaja Ye MD by Parul Rios PA-C  7/12/2019   This patient was evaluated by me, recommendations made, documentation reviewed, edited, and revised by me, Padmaja Ye MD

## 2019-07-19 ENCOUNTER — HOSPITAL ENCOUNTER (OUTPATIENT)
Dept: CT IMAGING | Facility: HOSPITAL | Age: 39
Discharge: HOME | End: 2019-07-19
Admitting: PHYSICIAN ASSISTANT

## 2019-07-19 DIAGNOSIS — C20 RECTAL CANCER (HCC): ICD-10-CM

## 2019-07-19 PROCEDURE — 74177 CT ABD & PELVIS W/CONTRAST: CPT

## 2019-07-19 PROCEDURE — 71260 CT THORAX DX C+: CPT

## 2019-07-19 PROCEDURE — 25010000002 IOPAMIDOL 61 % SOLUTION: Performed by: PHYSICIAN ASSISTANT

## 2019-07-19 RX ADMIN — IOPAMIDOL 95 ML: 612 INJECTION, SOLUTION INTRAVENOUS at 09:56

## 2019-07-22 ENCOUNTER — TELEPHONE (OUTPATIENT)
Dept: SURGERY | Facility: CLINIC | Age: 39
End: 2019-07-22

## 2019-07-22 NOTE — TELEPHONE ENCOUNTER
Called pt and discussed results of CT: 6-7mm indeterminate nodule upper lobe R lung; will require follow-up CT.  No definite evidence metastatic disease in abdomen or pelvis.    Praul Rios PA-C 7/22/2019  7:11 PM

## 2019-07-22 NOTE — TELEPHONE ENCOUNTER
Pt called wanting to know results from her CT scans last Friday. Pt said she would feel a little relief to be able to discuss those with you.

## 2019-07-24 ENCOUNTER — PREP FOR SURGERY (OUTPATIENT)
Dept: OTHER | Facility: HOSPITAL | Age: 39
End: 2019-07-24

## 2019-07-24 ENCOUNTER — ANESTHESIA EVENT (OUTPATIENT)
Dept: GASTROENTEROLOGY | Facility: HOSPITAL | Age: 39
End: 2019-07-24

## 2019-07-24 ENCOUNTER — HOSPITAL ENCOUNTER (OUTPATIENT)
Facility: HOSPITAL | Age: 39
Setting detail: HOSPITAL OUTPATIENT SURGERY
Discharge: HOME | End: 2019-07-24
Attending: COLON & RECTAL SURGERY | Admitting: COLON & RECTAL SURGERY

## 2019-07-24 ENCOUNTER — ANESTHESIA (OUTPATIENT)
Dept: GASTROENTEROLOGY | Facility: HOSPITAL | Age: 39
End: 2019-07-24

## 2019-07-24 VITALS
WEIGHT: 238 LBS | HEART RATE: 76 BPM | TEMPERATURE: 98.1 F | BODY MASS INDEX: 38.25 KG/M2 | SYSTOLIC BLOOD PRESSURE: 119 MMHG | OXYGEN SATURATION: 99 % | DIASTOLIC BLOOD PRESSURE: 89 MMHG | RESPIRATION RATE: 15 BRPM | HEIGHT: 66 IN

## 2019-07-24 DIAGNOSIS — C20 RECTAL CANCER (HCC): ICD-10-CM

## 2019-07-24 LAB
B-HCG UR QL: NEGATIVE
INTERNAL NEGATIVE CONTROL: NEGATIVE
INTERNAL POSITIVE CONTROL: POSITIVE
Lab: NORMAL

## 2019-07-24 PROCEDURE — 76872 US TRANSRECTAL: CPT | Performed by: COLON & RECTAL SURGERY

## 2019-07-24 PROCEDURE — 25010000002 PROPOFOL 10 MG/ML EMULSION: Performed by: ANESTHESIOLOGY

## 2019-07-24 PROCEDURE — 45335 SIGMOIDOSCOPY W/SUBMUC INJ: CPT | Performed by: COLON & RECTAL SURGERY

## 2019-07-24 PROCEDURE — 45341 SIGMOIDOSCOPY W/ULTRASOUND: CPT | Performed by: COLON & RECTAL SURGERY

## 2019-07-24 PROCEDURE — 88305 TISSUE EXAM BY PATHOLOGIST: CPT | Performed by: COLON & RECTAL SURGERY

## 2019-07-24 PROCEDURE — 81025 URINE PREGNANCY TEST: CPT | Performed by: COLON & RECTAL SURGERY

## 2019-07-24 PROCEDURE — 45331 SIGMOIDOSCOPY AND BIOPSY: CPT | Performed by: COLON & RECTAL SURGERY

## 2019-07-24 RX ORDER — PROPOFOL 10 MG/ML
VIAL (ML) INTRAVENOUS CONTINUOUS PRN
Status: DISCONTINUED | OUTPATIENT
Start: 2019-07-24 | End: 2019-07-24 | Stop reason: SURG

## 2019-07-24 RX ORDER — LIDOCAINE HYDROCHLORIDE 20 MG/ML
INJECTION, SOLUTION INFILTRATION; PERINEURAL AS NEEDED
Status: DISCONTINUED | OUTPATIENT
Start: 2019-07-24 | End: 2019-07-24 | Stop reason: SURG

## 2019-07-24 RX ORDER — PROPOFOL 10 MG/ML
VIAL (ML) INTRAVENOUS AS NEEDED
Status: DISCONTINUED | OUTPATIENT
Start: 2019-07-24 | End: 2019-07-24 | Stop reason: SURG

## 2019-07-24 RX ORDER — SODIUM CHLORIDE 0.9 % (FLUSH) 0.9 %
3 SYRINGE (ML) INJECTION EVERY 12 HOURS SCHEDULED
Status: DISCONTINUED | OUTPATIENT
Start: 2019-07-24 | End: 2019-07-24 | Stop reason: HOSPADM

## 2019-07-24 RX ORDER — SODIUM CHLORIDE 0.9 % (FLUSH) 0.9 %
3-10 SYRINGE (ML) INJECTION AS NEEDED
Status: DISCONTINUED | OUTPATIENT
Start: 2019-07-24 | End: 2019-07-24 | Stop reason: HOSPADM

## 2019-07-24 RX ORDER — SODIUM CHLORIDE, SODIUM LACTATE, POTASSIUM CHLORIDE, CALCIUM CHLORIDE 600; 310; 30; 20 MG/100ML; MG/100ML; MG/100ML; MG/100ML
30 INJECTION, SOLUTION INTRAVENOUS CONTINUOUS PRN
Status: DISCONTINUED | OUTPATIENT
Start: 2019-07-24 | End: 2019-07-24 | Stop reason: HOSPADM

## 2019-07-24 RX ADMIN — SODIUM CHLORIDE, POTASSIUM CHLORIDE, SODIUM LACTATE AND CALCIUM CHLORIDE 30 ML/HR: 600; 310; 30; 20 INJECTION, SOLUTION INTRAVENOUS at 07:01

## 2019-07-24 RX ADMIN — LIDOCAINE HYDROCHLORIDE 40 MG: 20 INJECTION, SOLUTION INFILTRATION; PERINEURAL at 07:30

## 2019-07-24 RX ADMIN — PROPOFOL 160 MCG/KG/MIN: 10 INJECTION, EMULSION INTRAVENOUS at 07:30

## 2019-07-24 RX ADMIN — PROPOFOL 150 MG: 10 INJECTION, EMULSION INTRAVENOUS at 07:30

## 2019-07-24 NOTE — ANESTHESIA PREPROCEDURE EVALUATION
Anesthesia Evaluation     history of anesthetic complications:               Airway   Mallampati: I  TM distance: >3 FB  Neck ROM: full  Dental - normal exam     Pulmonary    (+) a smoker Former,   (-) shortness of breath, recent URI, sleep apnea  Cardiovascular     (+) hypertension, valvular problems/murmurs murmur,   (-) dysrhythmias      Neuro/Psych  GI/Hepatic/Renal/Endo    (+)  GERD,    (-) liver disease, no renal disease, diabetes    Musculoskeletal     Abdominal    Substance History      OB/GYN          Other      history of cancer                    Anesthesia Plan    ASA 2     MAC     intravenous induction   Anesthetic plan, all risks, benefits, and alternatives have been provided, discussed and informed consent has been obtained with: patient.

## 2019-07-24 NOTE — ANESTHESIA POSTPROCEDURE EVALUATION
Patient: Carole Weaver    Procedure Summary     Date:  07/24/19 Room / Location:   [facility]    Anesthesia Start:  0731 Anesthesia Stop:  0807    Procedures:       ULTRASOUND TRANSRECTAL (N/A Rectum)      FLEXIBLE SIGMOIDOSCOPY with biopsies / 9mL tattoo injection (N/A ) Diagnosis:       Rectal cancer (CMS/HCC)      (Rectal cancer (CMS/HCC) [C20])    Surgeon:  Padmaja Ye MD Provider:  Vidya Huber MD    Anesthesia Type:  MAC ASA Status:  2          Anesthesia Type: MAC  Last vitals  BP   119/89 (07/24/19 0826)   Temp   36.7 °C (98.1 °F) (07/24/19 0655)   Pulse   76 (07/24/19 0826)   Resp   15 (07/24/19 0826)     SpO2   99 % (07/24/19 0826)     Post Anesthesia Care and Evaluation    Patient location during evaluation: PHASE II  Level of consciousness: awake and alert  Anesthetic complications: No anesthetic complications

## 2019-07-25 LAB
LAB AP CASE REPORT: NORMAL
LAB AP DIAGNOSIS COMMENT: NORMAL
PATH REPORT.FINAL DX SPEC: NORMAL
PATH REPORT.GROSS SPEC: NORMAL

## 2019-08-01 ENCOUNTER — CONSULT (OUTPATIENT)
Dept: ONCOLOGY | Facility: CLINIC | Age: 39
End: 2019-08-01

## 2019-08-01 ENCOUNTER — CONSULT (OUTPATIENT)
Dept: RADIATION ONCOLOGY | Facility: CLINIC | Age: 39
End: 2019-08-01

## 2019-08-01 ENCOUNTER — APPOINTMENT (OUTPATIENT)
Dept: RADIATION ONCOLOGY | Facility: HOSPITAL | Age: 39
End: 2019-08-01

## 2019-08-01 ENCOUNTER — APPOINTMENT (OUTPATIENT)
Dept: OTHER | Facility: HOSPITAL | Age: 39
End: 2019-08-01

## 2019-08-01 ENCOUNTER — PREP FOR SURGERY (OUTPATIENT)
Dept: OTHER | Facility: HOSPITAL | Age: 39
End: 2019-08-01

## 2019-08-01 ENCOUNTER — APPOINTMENT (OUTPATIENT)
Dept: WOMENS IMAGING | Facility: HOSPITAL | Age: 39
End: 2019-08-01

## 2019-08-01 VITALS
HEIGHT: 65 IN | HEART RATE: 78 BPM | DIASTOLIC BLOOD PRESSURE: 87 MMHG | BODY MASS INDEX: 39.9 KG/M2 | SYSTOLIC BLOOD PRESSURE: 132 MMHG | OXYGEN SATURATION: 96 % | WEIGHT: 239.5 LBS | RESPIRATION RATE: 16 BRPM | TEMPERATURE: 97.7 F

## 2019-08-01 VITALS
WEIGHT: 241 LBS | SYSTOLIC BLOOD PRESSURE: 153 MMHG | HEART RATE: 90 BPM | BODY MASS INDEX: 40.15 KG/M2 | DIASTOLIC BLOOD PRESSURE: 103 MMHG | OXYGEN SATURATION: 95 % | HEIGHT: 65 IN

## 2019-08-01 DIAGNOSIS — C20 ADENOCARCINOMA OF RECTUM (HCC): Primary | ICD-10-CM

## 2019-08-01 DIAGNOSIS — C20 RECTAL CANCER (HCC): Primary | ICD-10-CM

## 2019-08-01 DIAGNOSIS — D68.51 HETEROZYGOUS FACTOR V LEIDEN MUTATION (HCC): ICD-10-CM

## 2019-08-01 DIAGNOSIS — Z83.2 FAMILY HISTORY OF FACTOR V LEIDEN MUTATION: ICD-10-CM

## 2019-08-01 LAB
ALBUMIN SERPL-MCNC: 3.7 G/DL (ref 3.5–5.2)
ALBUMIN/GLOB SERPL: 1.2 G/DL
ALP SERPL-CCNC: 93 U/L (ref 39–117)
ALT SERPL W P-5'-P-CCNC: 15 U/L (ref 1–33)
ANION GAP SERPL CALCULATED.3IONS-SCNC: 8.5 MMOL/L (ref 5–15)
AST SERPL-CCNC: 15 U/L (ref 1–32)
BASOPHILS # BLD AUTO: 0.03 10*3/MM3 (ref 0–0.2)
BASOPHILS NFR BLD AUTO: 0.3 % (ref 0–1.5)
BILIRUB SERPL-MCNC: <0.2 MG/DL (ref 0.1–1.2)
BUN BLD-MCNC: 12 MG/DL (ref 6–20)
BUN/CREAT SERPL: 15.6 (ref 7–25)
CALCIUM SPEC-SCNC: 9.3 MG/DL (ref 8.6–10.5)
CEA SERPL-MCNC: 18.21 NG/ML
CHLORIDE SERPL-SCNC: 108 MMOL/L (ref 98–107)
CO2 SERPL-SCNC: 24.5 MMOL/L (ref 22–29)
CREAT BLD-MCNC: 0.77 MG/DL (ref 0.57–1)
DEPRECATED RDW RBC AUTO: 41.1 FL (ref 37–54)
EOSINOPHIL # BLD AUTO: 0.18 10*3/MM3 (ref 0–0.4)
EOSINOPHIL NFR BLD AUTO: 2 % (ref 0.3–6.2)
ERYTHROCYTE [DISTWIDTH] IN BLOOD BY AUTOMATED COUNT: 12.9 % (ref 12.3–15.4)
F5 GENE MUT ANL BLD/T: ABNORMAL
GFR SERPL CREATININE-BSD FRML MDRD: 83 ML/MIN/1.73
GLOBULIN UR ELPH-MCNC: 3 GM/DL
GLUCOSE BLD-MCNC: 130 MG/DL (ref 65–99)
HCT VFR BLD AUTO: 44.3 % (ref 34–46.6)
HGB BLD-MCNC: 14.8 G/DL (ref 12–15.9)
IMM GRANULOCYTES # BLD AUTO: 0.02 10*3/MM3 (ref 0–0.05)
IMM GRANULOCYTES NFR BLD AUTO: 0.2 % (ref 0–0.5)
LYMPHOCYTES # BLD AUTO: 2.45 10*3/MM3 (ref 0.7–3.1)
LYMPHOCYTES NFR BLD AUTO: 27 % (ref 19.6–45.3)
MCH RBC QN AUTO: 29.1 PG (ref 26.6–33)
MCHC RBC AUTO-ENTMCNC: 33.4 G/DL (ref 31.5–35.7)
MCV RBC AUTO: 87.2 FL (ref 79–97)
MONOCYTES # BLD AUTO: 0.6 10*3/MM3 (ref 0.1–0.9)
MONOCYTES NFR BLD AUTO: 6.6 % (ref 5–12)
NEUTROPHILS # BLD AUTO: 5.79 10*3/MM3 (ref 1.7–7)
NEUTROPHILS NFR BLD AUTO: 63.9 % (ref 42.7–76)
NRBC BLD AUTO-RTO: 0 /100 WBC (ref 0–0.2)
PLATELET # BLD AUTO: 389 10*3/MM3 (ref 140–450)
PMV BLD AUTO: 9 FL (ref 6–12)
POTASSIUM BLD-SCNC: 4 MMOL/L (ref 3.5–5.2)
PROT SERPL-MCNC: 6.7 G/DL (ref 6–8.5)
RBC # BLD AUTO: 5.08 10*6/MM3 (ref 3.77–5.28)
SODIUM BLD-SCNC: 141 MMOL/L (ref 136–145)
WBC NRBC COR # BLD: 9.07 10*3/MM3 (ref 3.4–10.8)

## 2019-08-01 PROCEDURE — 81241 F5 GENE: CPT | Performed by: INTERNAL MEDICINE

## 2019-08-01 PROCEDURE — 76641 ULTRASOUND BREAST COMPLETE: CPT | Performed by: RADIOLOGY

## 2019-08-01 PROCEDURE — 77290 THER RAD SIMULAJ FIELD CPLX: CPT | Performed by: RADIOLOGY

## 2019-08-01 PROCEDURE — 99245 OFF/OP CONSLTJ NEW/EST HI 55: CPT | Performed by: INTERNAL MEDICINE

## 2019-08-01 PROCEDURE — 77334 RADIATION TREATMENT AID(S): CPT | Performed by: RADIOLOGY

## 2019-08-01 PROCEDURE — 77066 DX MAMMO INCL CAD BI: CPT | Performed by: RADIOLOGY

## 2019-08-01 PROCEDURE — MDREVIEWSP: Performed by: RADIOLOGY

## 2019-08-01 PROCEDURE — 77263 THER RADIOLOGY TX PLNG CPLX: CPT | Performed by: RADIOLOGY

## 2019-08-01 PROCEDURE — 80053 COMPREHEN METABOLIC PANEL: CPT | Performed by: INTERNAL MEDICINE

## 2019-08-01 PROCEDURE — 77062 BREAST TOMOSYNTHESIS BI: CPT | Performed by: RADIOLOGY

## 2019-08-01 PROCEDURE — 36415 COLL VENOUS BLD VENIPUNCTURE: CPT | Performed by: INTERNAL MEDICINE

## 2019-08-01 PROCEDURE — 99243 OFF/OP CNSLTJ NEW/EST LOW 30: CPT | Performed by: RADIOLOGY

## 2019-08-01 PROCEDURE — G0279 TOMOSYNTHESIS, MAMMO: HCPCS | Performed by: RADIOLOGY

## 2019-08-01 PROCEDURE — 82378 CARCINOEMBRYONIC ANTIGEN: CPT | Performed by: INTERNAL MEDICINE

## 2019-08-01 PROCEDURE — 85025 COMPLETE CBC W/AUTO DIFF WBC: CPT | Performed by: INTERNAL MEDICINE

## 2019-08-01 RX ORDER — ONDANSETRON HYDROCHLORIDE 8 MG/1
8 TABLET, FILM COATED ORAL 3 TIMES DAILY PRN
Qty: 30 TABLET | Refills: 5 | Status: SHIPPED | OUTPATIENT
Start: 2019-08-01 | End: 2019-12-12 | Stop reason: SDUPTHER

## 2019-08-01 RX ORDER — CEFAZOLIN SODIUM 2 G/100ML
2 INJECTION, SOLUTION INTRAVENOUS ONCE
Status: CANCELLED | OUTPATIENT
Start: 2019-08-09 | End: 2019-08-01

## 2019-08-01 NOTE — PROGRESS NOTES
DIAGNOSIS and REASON FOR CONSULTATION:    Adenocarcinoma of rectum (CMS/HCC)   Clinical Stage IIA (cT3, cN0, cM0)    - for advice and recommendations regarding the diagnosis    Referring Provider:  Padmaja Ye MD  Patient Care Team:  Minesh Leone MD as PCP - General (Family Medicine)  Padmaja Ye MD as Referring Physician (Colon and Rectal Surgery)  Beatrice Lau MD as Consulting Physician (Radiation Oncology)  Dong Nguyen MD PhD as Consulting Physician (Hematology and Oncology)  Wendy Cottrell MD as Consulting Physician (Obstetrics and Gynecology)  Roland Thorpe MD as Consulting Physician (Gastroenterology)    CHIEF COMPLAINT:  For advice and recommendations regarding Adenocarcinoma of rectum (CMS/HCC)  HISTORY OF PRESENT ILLNESS:  The patient is a 38 y.o. year old female who has a history of irritable bowel which prompted a referral to gastroenterology.  She was having some fecal urgency, loose stools and intermittent blood and mucus in stool.  Additionally she has lost approximately 12 pounds with some hemorrhoid symptoms.    She underwent colonoscopy on July 8, 2019 which showed mild esophagitis, gastritis, 2 polyps in the colon and a fungating circumferential bleeding 4 cm mass with malignant appearance in the mid rectum from 13 to 17 cm from the anus causing partial obstruction.  Biopsies from the rectal mass returned positive for invasive moderately differentiated adenocarcinoma.  The tissue was negative for mismatch repair.    She completed a CAT scan of the chest, abdomen and pelvis on July 19, 2019 which showed 2 small lung nodules, some wall thickening at the rectosigmoid junction but no lymphadenopathy or other evidence of metastatic disease.  Transrectal ultrasound on July 24, 2019 suggested a mass at 10 cm through the muscularis propria with no lymphadenopathy suggesting a clinical T3N0 rectal cancer at 10 cm.  Repeat biopsies from that procedure were also positive for  invasive moderately differentiated adenocarcinoma.    Given the stage, neoadjuvant chemo and radiation were recommended. She saw the [facility] physicians this morning and I was asked to see the patient at the request of the referring provider noted above for advice and recommendations regarding this diagnosis.     Clinically, she is doing very well now. She states the diarrhea has been better since the colonoscopy.  She currently is taking the Bentyl as needed and does have better control over the stool.  She states now she feels slightly constipated, straining slightly to pass the stool and still feels like she is passing small amount of stool.  She has no urinary complaints.  There is no chance she could be pregnant as her  had a vasectomy 5 years ago.    Performance Status: (1) Restricted in physically strenuous activity, ambulatory and able to do work of light nature    Past Medical History: she  has a past medical history of Abnormal Pap smear of cervix (1998), Anxiety, Cervical lymphadenitis (2012), Chronic diarrhea, Colon cancer (CMS/[facility]), Colon polyps, Depression, GERD (gastroesophageal reflux disease), Hematochezia, Hemorrhoids, History of TB skin testing (2009), HPV in female (), Infected insect bite of neck (2016), Irritable bowel syndrome, Kidney stones (2007), Lumbar disc disease, PIH (pregnancy induced hypertension) (), Rectal cancer (CMS/HCC) (2019), Right leg pain, SAB (spontaneous ) (1996), and Sepsis due to cellulitis (CMS/[facility]) (2002).    Past Surgical History:  she has a past surgical history that includes Pap Smear (N/A, 2014); Esophagogastroduodenoscopy (N/A, 2019); Colonoscopy w/ polypectomy (N/A, 2019); Dilation and evacuation (N/A, ); Cholecystectomy (N/A, 10/10/2003); Vaginal delivery (N/A, 1998); transrectal ultrasound (N/A, 2019); and Sigmoidoscopy (N/A, 2019).    Meds:    Current  Outpatient Medications:   •  clonazePAM (KLONOPIN) 0.5 MG tablet, Take 1 tablet by mouth 2 (Two) Times a Day As Needed for Seizures., Disp: 60 tablet, Rfl: 5  •  dicyclomine (BENTYL) 20 MG tablet, Take 1 tablet by mouth Every 6 (Six) Hours., Disp: 120 tablet, Rfl: 6  •  escitalopram (LEXAPRO) 20 MG tablet, Take 1 tablet by mouth Daily., Disp: 90 tablet, Rfl: 3    Allergies:  No Known Allergies    Family History:  her family history includes Cancer in her paternal uncle; Colon cancer in her paternal uncle; Colon polyps in her mother; Diabetes in her paternal uncle; Heart disease in her mother and paternal grandfather; Hyperlipidemia in her mother; Hypertension in her mother; No Known Problems in her father and son.    Social History:  she  reports that she has quit smoking. Her smoking use included electronic cigarette. She has a 30.00 pack-year smoking history. She has never used smokeless tobacco. She reports that she drinks alcohol. She reports that she does not use drugs.    Pertinent Findings on   Review of Systems   Constitutional: Positive for appetite change and unexpected weight change. Negative for chills, diaphoresis, fatigue and fever.   HENT:   Positive for hearing loss. Negative for lump/mass, mouth sores, nosebleeds, sore throat, tinnitus, trouble swallowing and voice change.    Eyes: Negative for eye problems and icterus.   Respiratory: Negative for chest tightness, cough, hemoptysis, shortness of breath and wheezing.    Cardiovascular: Negative for chest pain, leg swelling and palpitations.   Gastrointestinal: Positive for blood in stool and diarrhea. Negative for abdominal distention, abdominal pain, constipation, nausea, rectal pain and vomiting.   Endocrine: Negative for hot flashes.   Genitourinary: Negative for bladder incontinence, difficulty urinating, dyspareunia, dysuria, frequency, hematuria, menstrual problem, nocturia, pelvic pain, vaginal bleeding and vaginal discharge.     Musculoskeletal: Negative for arthralgias, back pain, flank pain, gait problem, myalgias, neck pain and neck stiffness.   Skin: Negative for itching, rash and wound.   Neurological: Negative for dizziness, extremity weakness, gait problem, headaches, light-headedness, numbness, seizures and speech difficulty.   Hematological: Negative for adenopathy. Bruises/bleeds easily.   Psychiatric/Behavioral: Negative for confusion, decreased concentration, depression, sleep disturbance and suicidal ideas. The patient is nervous/anxious.    :  There were no vitals filed for this visit.      Pertinent Findings on:  Physical Exam   Constitutional: She is oriented to person, place, and time. She appears well-developed and well-nourished. She is active and cooperative. No distress.   HENT:   Head: Normocephalic and atraumatic.   Nose: Nose normal.   Mouth/Throat: Mucous membranes are normal. Normal dentition.   Eyes: Conjunctivae and EOM are normal.   Neck: Normal range of motion.   Pulmonary/Chest: Effort normal.   Abdominal: Normal appearance. There is no hepatosplenomegaly.   Musculoskeletal: Normal range of motion.     Vascular Status -  Her right foot exhibits no edema. Her left foot exhibits no edema.  Neurological: She is alert and oriented to person, place, and time.   Skin: Skin is warm and dry.   Psychiatric: She has a normal mood and affect. Her behavior is normal. Judgment and thought content normal.   Kidney is just a general diet thanks  Assessment:   1. Adenocarcinoma of rectum (CMS/HCC)     Stage IIA (cT3 N0 M0)  This assessment comes from my review of the imaging, pathology, physician notes and other pertinent information as mentioned.    Plan:   We discussed the specifics of her biopsies and radiographic studies to this point. We reviewed the stage of the disease and the recommendations for preoperative radiation therapy given concurrently with chemotherapy.  She understands the plan is for surgical resection  thereafter even if she is found to have a nice response to this course of treatment.  She is awaiting a date for port placement with Dr. Joseph and will have her PET scan next week.     We discussed a course of treatment aimed at the pelvis of 4500 cGy given in 25 treatments over 5 weeks, started concurrently with the chemotherapy.  We will be fusing her PET scan to our treatment planning scans to ensure adequate coverage of the rectal mass and the adjacent nodes in the pelvis.    We discussed the acute side effects of mild irritation of the skin, particularly in the anal region, proctitis symptoms including mild itch and burning, irritation of the bladder including mild dysuria, urinary frequency, irritability of the bowel and diarrhea and possible fatigue.  We also discussed the small but present risk of long-term bladder symptoms and difficulties with the small bowel which could lead to bowel obstruction.     I believe all her questions were answered and she would like to receive her treatment at our [facility] facility. We were able to start our treatment planning process today here at [place].  We will be ready to start her treatments on August 12th after her chemotherapy.    I spent greater than 45 minutes in face-to-face time with the patient and 30 minutes of that time were spent in counseling and coordination of care, including review of imaging and pathology; indications, goals, logistics, alternatives and risks - both common and rare - for my recommendations as well as surveillance and potential outcomes.

## 2019-08-01 NOTE — PROGRESS NOTES
.     REASON FOR CONSULTATION:     Provide an opinion on any further workup or treatment of newly diagnosed rectal cancer.                              REQUESTING PHYSICIAN: aPdmaja Ye MD     RECORDS OBTAINED:  Records of the patients history including those obtained from the referring provider were reviewed and summarized in detail.    HISTORY OF PRESENT ILLNESS:  The patient is a 38 y.o. year old female for newly diagnosed rectal cancer. She is here for initial evaluation requested by her surgeon Padmaja Ye MD. The patient is accompanied by her mother who helped with some of the history.     The patient reports she has intermittent rectal bleeding and urgency, mucous with her stool, starting sometime in 2016. At that time she was referred to GI service, and was diagnosed of irritable bowel syndrome. Nevertheless she had worsening urgency for bowel movement, and had a couple of incidents losing control of stool. She was recently seen by Roland Thorpe MD again and had colonoscopy and EGD exam on 07/08/2019. She was found having a circumferential rectal mass. According to the procedure note, the patient had a fungating circumferential bleeding 4 cm mass in the middle rectum, at distance between 13 cm and 17 cm from the anus. Mass was causing partial obstruction. There were also colon polyps found at the hepatic flexure and also at the rectosigmoid junction 23 cm from the anus. Both were resected and retrieved. EGD examination reported grade A esophagitis at the GE junction and patchy discontinuous erythema and aggravation of the mucosa without bleeding in the stomach body. There is normal mucosa of the duodenum. Pathology evaluation from this procedure reported moderately differentiated adenocarcinoma involving the rectal mass. The rectal sigmoid junction polyp was tubular/tubulovillous adenoma with high grade dysplasia negative for invasive malignancy. The hepatic flexure polyp was also  tubular/tubulovillous adenoma negative for high grade dysplasia or malignancy. The biopsy from the esophagus reports squamocolumnar mucosa with inflammatory changes suggestive of mild reflux esophagitis, negative for interstitial metaplasia. Gastric biopsy was benign and duodenal biopsy was also benign. There is no mention of H-pylori.     Patient was subsequently referred to colorectal surgeon Padmaja Ye MD for further evaluation. The patient had CT scan examination for chest, abdomen and pelvis requested by Dr. Ye and were done on 07/13/2019. The study reported no evidence of lymphadenopathy in the abdomen and pelvis. There was wall thickening of the rectosigmoid junction. Normal spleen, pancreas, adrenal glands and kidneys. There was fatty liver infiltration but no focal lymphatic lesions. There was a small 6-7 mm noncalcified nodule in the right upper lobe and a tiny 3 mm subpleural nodule in the right middle lobe. No mediastinal or hilar lymphadenopathy.     Dr. Ye performed staging on the rectal ultrasound examination. This study reported tumor penetrating through the muscularis propria as T3 disease; there were no lymph nodes identified.    The patient reports she was having about 12 pound weight loss based on her medical records, the patient denies any intentional weight loss. She has no fever, but has some night sweats. She has cold sensitivity. She has occasional ankle swelling but no palpitations or chest pain. No dyspnea, no cough or hemoptysis. Denies skin rashes, no sore throat or vision changes. No headaches, no numbness, fainting. No chronic back pain. She does have some easy bruising but no spontaneous bleeding otherwise.            Past Medical History:   Diagnosis Date   • Abnormal Pap smear of cervix 02/02/1998    JULIUS I   • Anxiety    • Cervical lymphadenitis 06/06/2012    SEEN AT [facility] ER   • Chronic diarrhea    • Colon cancer (CMS/HCC)     Stage III   • Colon polyps    • Depression    •  GERD (gastroesophageal reflux disease)    • Hematochezia    • Hemorrhoids    • History of TB skin testing 2009    TB Skin Test   • HPV in female    • Infected insect bite of neck 2016    SEEN AT [facility]   • Irritable bowel syndrome    • Kidney stones 2007    SEEN AT [facility] ER   • Lumbar disc disease    • PIH (pregnancy induced hypertension)    • Rectal cancer (CMS/HCC) 2019    INVASIVE MODERATELY DIFFERENTIATED ADENOCARCINOMA   • Right leg pain    • SAB (spontaneous ) 1996     A2-1 INDUCED   • Sepsis due to cellulitis (CMS/HCC) 2002    LEFT GREAT TOE, ADMITTED TO [facility]     Past Surgical History:   Procedure Laterality Date   • CHOLECYSTECTOMY N/A 10/10/2003    LAPAROSCOPIC WITH CHOLANGIOGRAM, DR. JAMEY TALAVERA AT [facility]   • COLONOSCOPY W/ POLYPECTOMY N/A 2019    15 MM TUBULOVILLOUS ADENOMA POLYP IN HEPATIC FLEXURE, 20 MMTUBULOVILLOUS ADENOMA WITH HIGH GRADE DYSPLASIA IN RECTOSIGMOID, 4 CM MASS IN MID RECTUM, PATH: INVASIVE MODERATELY DIFFERENTIATED ADENOCARCINOMA, DR. JENNIFER LI AT [facility]   • DILATATION AND EVACUATION N/A    • ENDOSCOPY N/A 2019    LA GRADE A ESOPHAGITIS, GASTRITIS, ALL BIOPSIES BENIGN, DR. JENNIFER LI AT [facility]   • PAP SMEAR N/A 2014   • SIGMOIDOSCOPY N/A 2019    INFILTRATIVE PARTIALLY OBSTRUCTING LARGE RECTAL CANCER, AREA TATOOED, DR. GERONIMO ESPARZA AT [facility]   • TRANSRECTAL ULTRASOUND N/A 2019    Procedure: ULTRASOUND TRANSRECTAL;  Surgeon: Geronimo Esparza MD;  Location: [facility] ENDOSCOPY;  Service: General   • VAGINAL DELIVERY N/A 1998       HEMATOLOGIC/ONCOLOGIC HISTORY:  (History from previous dates can be found in the separate document.)  See HPI.    MEDICATIONS    Current Outpatient Medications:   •  clonazePAM (KLONOPIN) 0.5 MG tablet, Take 1 tablet by mouth 2 (Two) Times a Day As Needed for Seizures., Disp: 60 tablet, Rfl: 5  •  dicyclomine (BENTYL) 20 MG tablet, Take 1  tablet by mouth Every 6 (Six) Hours., Disp: 120 tablet, Rfl: 6  •  escitalopram (LEXAPRO) 20 MG tablet, Take 1 tablet by mouth Daily., Disp: 90 tablet, Rfl: 3  •  ondansetron (ZOFRAN) 8 MG tablet, Take 1 tablet by mouth 3 (Three) Times a Day As Needed for Nausea or Vomiting., Disp: 30 tablet, Rfl: 5    ALLERGIES:   No Known Allergies    SOCIAL HISTORY:       Social History     Socioeconomic History   • Marital status:      Spouse name: Justus   • Number of children: 1   • Years of education: Not on file   • Highest education level:  College   Occupational History     Employer: [employer]   Tobacco Use   • Smoking status: Current Every Day Smoker     Packs/day: 1.50     Years: 20.00     Pack years: 30.00     Types: Electronic Cigarette   • Smokeless tobacco: Never Used   Substance and Sexual Activity   • Alcohol use: Yes     Comment: Rarely   • Drug use: No   • Sexual activity: Defer         FAMILY HISTORY:     Mother has positive factor V Leiden mutation, although she did not have thrombosis, mother also is coronary disease with stenting, she also is occasional bruising.  Maternal grandmother had DVT, she had multiple surgical procedures.  Patient mother's half-brother had metastatic colon cancer at diagnosis in his 50s.  Maternal great aunt has breast cancer.  Patient will follow his skin cancer in his 60s, exclusive.    REVIEW OF SYSTEMS:  Review of Systems   Constitutional: Positive for diaphoresis (Night sweats) and unexpected weight change (12 pound weight loss being 6 to 12 months.). Negative for appetite change, fatigue and fever.   HENT: Negative for congestion, mouth sores and rhinorrhea.    Eyes: Negative for visual disturbance.   Respiratory: Negative for cough and shortness of breath.    Cardiovascular: Negative for chest pain, palpitations and leg swelling.   Gastrointestinal: Positive for anal bleeding, blood in stool and diarrhea. Negative for abdominal distention, abdominal pain and nausea.  "  Endocrine: Positive for cold intolerance.   Genitourinary: Negative for dysuria and frequency.   Musculoskeletal: Negative for arthralgias, back pain and joint swelling.   Skin: Negative.    Allergic/Immunologic: Negative for immunocompromised state.   Neurological: Negative for dizziness, numbness and headaches.   Hematological: Negative for adenopathy. Bruises/bleeds easily.   Psychiatric/Behavioral: Negative for agitation and confusion.              Vitals:    08/01/19 0834   BP: 132/87   Pulse: 78   Resp: 16   Temp: 97.7 °F (36.5 °C)   SpO2: 96%   Weight: 109 kg (239 lb 8 oz)   Height: 166 cm (65.35\")   PainSc: 0-No pain     Current Status 8/1/2019   ECOG score 0      PHYSICAL EXAM:      CONSTITUTIONAL:  Vital signs reviewed.  Well-developed, morbidly obese BMI 39.4, no distress, looks comfortable.  EYES:  Conjunctiva and lids unremarkable.  PERRLA  EARS,NOSE,MOUTH,THROAT:  Ears and nose appear unremarkable.  Lips, teeth, gums appear unremarkable.  RESPIRATORY:  Normal respiratory effort.  Lungs clear to auscultation bilaterally.  CARDIOVASCULAR: Normal rhythm and rate.  Normal S1, S2.  No murmurs rubs or gallops.  No significant lower extremity edema.  GASTROINTESTINAL: Obese, abdomen appears unremarkable.  Nontender.  No hepatomegaly.  No splenomegaly.  Normal bowel sounds.  LYMPHATIC:  No cervical, supraclavicular, axillary or groin lymphadenopathy.  MUSCULOSKELETAL:  Unremarkable gait and station.  Unremarkable digits/nails.  No cyanosis or clubbing.  SKIN:  Warm.  No rashes.  PSYCHIATRIC:  Normal judgment and insight.  Normal mood and affect.      RECENT LABS:        WBC   Date Value Ref Range Status   08/01/2019 9.07 3.40 - 10.80 10*3/mm3 Final     Hemoglobin   Date Value Ref Range Status   08/01/2019 14.8 12.0 - 15.9 g/dL Final     Platelets   Date Value Ref Range Status   08/01/2019 389 140 - 450 10*3/mm3 Final       IMAGE STUDY:   CT OF THE CHEST ABDOMEN AND PELVIS WITH CONTRAST 07/19/2019.   " "  HISTORY: Rectal cancer. Staging.     Axial images were obtained from the lung apices to the symphysis pubis  after intravenous and oral contrast.     In the right upper lobe on image 29 is an approximately 6 mm to 7 mm  noncalcified nodule. A tiny 3 mm subpleural nodule is seen in the right  middle lobe on image 58. No other lung nodules are seen.     No significant pulmonary infiltrates are seen. No pathologically  enlarged hilar or mediastinal lymph nodes are seen.     There is fatty infiltration of the liver. No focal hepatic lesions are  seen. Gallbladder has been removed.     The spleen, pancreas, adrenals and kidneys appear within normal limits.     There is wall thickening of the rectosigmoid junction which could  represent patient's known carcinoma.     No lymphadenopathy is seen in the abdomen and pelvis.     IMPRESSION:  1. A 6 mm to 7 mm noncalcified nodule in the right upper lobe. This is  indeterminate. Another tiny subpleural nodule in the right middle lobe  is seen as well. Suggest correlation to any prior outside CT scans of  the chest. If none are available, short-term followup CT of the chest in  approximately 3 months suggested.  2. Wall thickening of the rectosigmoid junction as described.  3. Mild fatty infiltration of the liver.  4. No definite evidence of metastatic disease in the abdomen and pelvis.     Pathology:  Tissue Pathology Exam: [id number]   Order: [id number]   Collected:  7/24/2019 07:49 Status:  Final result   Visible to patient:  No (Not Released) Dx:  Rectal cancer (CMS/HCC)   Component    Final Diagnosis   1. Rectum, \"Mass,\" Biopsy:               A. INVASIVE MODERATELY DIFFERENTIATED ADENOCARCINOMA (see Comment).    Electronically signed by Jania Perez MD on 7/25/2019 at 1442   Comment    This case was shared in internal consultation with Dr. Michel who concurs.    Gross Description    1.  The specimen is received in formalin labeled with the patient's name and further " designated 'rectal mass biopsy' are multiple small fragments of gray-tan tissue. The specimen is submitted for embedding as received.                 Assessment/Plan      Adenocarcinoma of rectum (CMS/HCC)  1.  The patient is a 38-year-old female who has newly diagnosed rectal cancer, rectal ultrasound examination reported T3N0 disease without lymphadenopathy. CT scan of chest, abdomen and pelvis reported no lymphadenopathy in the abdomen, pelvis or chest. She does have small pulmonary nodule 6-7 mm and 2 mm with indeterminate feature. There is no solid evidence of metastatic disease otherwise. Based on the current imaging studies, this patient has stage IIA (T3N0M0) disease.  She will need PET scan examination for further evaluation prior to starting concurrent chemoradiation therapy.     I discussed with the patient and her mother today about the evaluation and treatment modality. Basically it will be a standard treatment schedule with concurrent neoadjuvant adjuvant chemoradiation therapy first followed by surgical resection and then followed by further chemotherapy. She will be started on infusional 5FU during concurrent neoadjuvant chemoradiation therapy and be FOLFOX-6 regimen for adjuvant chemotherapy repeating every 2 weeks for another 8 cycles. I discussed with patient and her mother about possible side-effects including marrow suppression, peripheral neuropathy, cold sensitivity among other things. She should stay away from pets at home.     I also recommended a Port-A-Catheter placement for her chemotherapy.     2.  This patient has also strong family history for malignancy and I recommended to refer her for genetic counseling.     3.  Family history of positive factor V Leiden mutation with her mother, who never had thrombophilia.  This was checked prior to starting her on female hormonal supplementation.    Discussed with the patient, She may need to be on low dose anticoagulation if test is positive  for the factor V Leiden mutation, because she would have high risk for thrombosis associated with Port-A-Catheter and active GI malignancy.    PLAN:  1. Patient will followup with Dr. Lau today for initial evaluation for radiation therapy.  2. Will arrange for patient to have chemotherapy teaching education for both infusional 5FU as well as future FOLFOX-6 regimen treatment.   3. Will refer patient to general surgery for PowerPort placement.  4. Will refer patient to genetic counseling due to personal history of cancer and strong family history of malignancy.   5.  PET scan examination within a week.   6. We will check patient for factor V Leiden mutation.    7. Will arrange patient to start 1st weekly infusional 5FU on 08/12/2019, with labs and nurse practitioner evaluation.   8. The patient will need weekly cbc and CMP check before starting chemoradiation therapy.   9. I also E-scribed Zofran to her pharmacy, use as needed for nausea and vomiting caused by chemoradiation therapy.       Discussed that with the patient and her mother in details about the evaluation and more importantly the treatment plan and the side effects.       I communicated with radiation oncologist Dr. Lau for evaluation and for starting concurrent chemoradiation.       ALEXANDRU HUNT M.D., Ph.D.    8/1/2019      Addendum:    This patient is positive for heterozygous factor V Leyden mutation.  I spoke with patient today on 8/2/2019, recommended low-dose anticoagulation.  I will prefer low-dose Lovenox 40 mg subcu daily considering her malignancy.    I also discussed with her today for possible observation and clinical trial [employer] 2018-02 which in no means will change her treatment plan.  The trial is only to obtain blood sample for genetic studies.  Patient is agreeable.  I will ask our research staff to call patient and to obtain consent and correcting blood sample.    ALEXANDRU HUNT M.D., Ph.D.    8/2/2019        CC:    MD Graciela Prado MD Laszlo J.K. Makk, MD Eric J. Hilgeford, M.D.    Sj Joseph M.D.       Dictated using Dragon Dictation.

## 2019-08-02 ENCOUNTER — RESEARCH ENCOUNTER (OUTPATIENT)
Dept: ONCOLOGY | Facility: CLINIC | Age: 39
End: 2019-08-02

## 2019-08-02 PROBLEM — D68.51 HETEROZYGOUS FACTOR V LEIDEN MUTATION (HCC): Status: ACTIVE | Noted: 2019-08-02

## 2019-08-02 NOTE — RESEARCH
[facility]  CONSULTING IN BLOOD DISORDERS & CANCER  Cruzito Jacobs, Marvel, Bib, Joshua, Jaret,  Loyda, Cale, Melanie, Patrick & Lanre  [address], [address]  [place] [address]  Phone: [phone]  Fax: [phone]      Patient Name:  Carole Weaver  YOB: 1980  Patient Age:  39 y.o.  Patient's Sex:  female    Date of Service:  08/02/2019                                         RESEARCH STUDY [id number]                                    Blood Sample Collection to Evaluate Biomarkers in Subjects with Untreated Solid Tumors Study 2018-01    Per request from Dr. Nguyen subject was called and Research reviewed the protocol for Research Study [id number]. Subject informed that a Research nurse would meet with subject and give her a copy of protocol on 8/8/2019 when subject will be in the [facility] office for chemotherapy education. Subject verbalized understanding.      Anais Hurtado RN    08/02/19, 10:25 AM

## 2019-08-05 ENCOUNTER — TELEPHONE (OUTPATIENT)
Dept: ONCOLOGY | Facility: HOSPITAL | Age: 39
End: 2019-08-05

## 2019-08-05 NOTE — TELEPHONE ENCOUNTER
LVM letting patient know    ----- Message from Dong Nguyen MD PhD sent at 8/5/2019  1:58 PM EDT -----  Regarding: RE: LOVENOX  After her port placement.     Thanks!    Dr. Nguyen  ----- Message -----  From: Jonelle Jefferson, RN  Sent: 8/5/2019  12:51 PM  To: Dong Ngueyn MD PhD  Subject: LOVENOX                                          Dr Nguyen, [facility] but wasn't sure if she had to go ahead and start this. Pt comes for chemo ed on Thursday and has port placement on Friday. Does she need to start immediately or after port placement?   Thanks!

## 2019-08-05 NOTE — TELEPHONE ENCOUNTER
Marlyn sent to Dr Nguyen to see when patient should start lovenox. Pt was not aware she needed to start this.     ----- Message from Lito Young sent at 8/5/2019 12:42 PM EDT -----  Contact: [phone]  Pt has questions about her lovenox

## 2019-08-06 ENCOUNTER — DOCUMENTATION (OUTPATIENT)
Dept: ONCOLOGY | Facility: CLINIC | Age: 39
End: 2019-08-06

## 2019-08-06 ENCOUNTER — APPOINTMENT (OUTPATIENT)
Dept: PREADMISSION TESTING | Facility: HOSPITAL | Age: 39
End: 2019-08-06

## 2019-08-06 VITALS
HEIGHT: 66 IN | RESPIRATION RATE: 16 BRPM | TEMPERATURE: 97.9 F | WEIGHT: 238.56 LBS | DIASTOLIC BLOOD PRESSURE: 90 MMHG | BODY MASS INDEX: 38.34 KG/M2 | HEART RATE: 87 BPM | OXYGEN SATURATION: 94 % | SYSTOLIC BLOOD PRESSURE: 134 MMHG

## 2019-08-06 LAB — HCG SERPL QL: NEGATIVE

## 2019-08-06 PROCEDURE — 84703 CHORIONIC GONADOTROPIN ASSAY: CPT | Performed by: SURGERY

## 2019-08-06 PROCEDURE — 36415 COLL VENOUS BLD VENIPUNCTURE: CPT

## 2019-08-06 NOTE — PROGRESS NOTES
OSW initiated a phone call to the pt to discuss her Distress Questionnaire completed on 8/1/19 on which she scored 7/10. The pt is a 39 year old   female who has been diagnosed with stage IIA rectal cancer. She stated that her treatment plan will include two chemotherapy (5FU/FOLFOX) treatments, 28 XRT treatments and surgery in November after an 8 week post-chemo rest period, all at the [facility] location.    The pt was extremely upbeat and very positive. She denied having any needs at this time. She is employed as an  at a Dental [employer] and stated that she will be able to work from home while undergoing treatment. The pt resides in [place] with her  and 21 year old son who works at [employer].     It was agreed that this OSW will send the pt a letter of introduction and support resources, [employer] and [employer], along with information about advance care directives.     Belinda Harris, [facility]  Oncology Social Worker

## 2019-08-06 NOTE — DISCHARGE INSTRUCTIONS
Take the following medications the morning of surgery with a small sip of water:    NONE    General Instructions:  • Do not eat solid food after midnight the night before surgery.  • You may drink clear liquids day of surgery but must stop at least one hour before your hospital arrival time.  • It is beneficial for you to have a clear drink that contains carbohydrates the day of surgery.  We suggest a 12 to 20 ounce bottle of Gatorade or Powerade for non-diabetic patients     Clear liquids are liquids you can see through.  Nothing red in color.     Plain water                               Sports drinks  Sodas                                   Gelatin (Jell-O)  Fruit juices without pulp such as white grape juice and apple juice  Popsicles that contain no fruit or yogurt  Tea or coffee (no cream or milk added)  Gatorade / Powerade  G2 / Powerade Zero    • Patients who avoid smoking, chewing tobacco and alcohol for 4 weeks prior to surgery have a reduced risk of post-operative complications.  Quit smoking as many days before surgery as you can.  • Do not smoke, use chewing tobacco or drink alcohol the day of surgery.   • Bring any papers given to you in the doctor’s office.  • Wear clean comfortable clothes and socks.  • Do not wear contact lenses, false eyelashes or make-up.  Bring a case for your glasses.   • Remove all piercings.  Leave jewelry and any other valuables at home.  • Hair extensions with metal clips must be removed prior to surgery.  • The Pre-Admission Testing nurse will instruct you to bring medications if unable to obtain an accurate list in Pre-Admission Testing.  • REPORT TO OUTPATIENT SURGERY ON 8-9-19  AT 0900 AM          Preventing a Surgical Site Infection:  • For 2 to 3 days before surgery, avoid shaving with a razor because the razor can irritate skin and make it easier to develop an infection.    • Any areas of open skin can increase the risk of a post-operative wound infection by allowing  bacteria to enter and travel throughout the body.  Notify your surgeon if you have any skin wounds / rashes even if it is not near the expected surgical site.  The area will need assessed to determine if surgery should be delayed until it is healed.  • The night prior to surgery sleep in a clean bed with clean clothing.  Do not allow pets to sleep with you.  • Shower on the morning of surgery using a fresh bar of anti-bacterial soap (such as Dial) and clean washcloth.  Dry with a clean towel and dress in clean clothing.  • Ask your surgeon if you will be receiving antibiotics prior to surgery.  • Make sure you, your family, and all healthcare providers clean their hands with soap and water or an alcohol based hand  before caring for you or your wound.    Day of surgery:  Upon arrival, a Pre-op nurse and Anesthesiologist will review your health history, obtain vital signs, and answer questions you may have.  The only belongings needed at this time will be a list of your home medications and if applicable your C-PAP/BI-PAP machine.  If you are staying overnight your family can leave the rest of your belongings in the car and bring them to your room later.  A Pre-op nurse will start an IV and you may receive medication in preparation for surgery, including something to help you relax.  Your family will be able to see you in the Pre-op area.  While you are in surgery your family should notify the waiting room  if they leave the waiting room area and provide a contact phone number.    Please be aware that surgery does come with discomfort.  We want to make every effort to control your discomfort so please discuss any uncontrolled symptoms with your nurse.   Your doctor will most likely have prescribed pain medications.      If you are going home after surgery you will receive individualized written care instructions before being discharged.  A responsible adult must drive you to and from the hospital  on the day of your surgery and stay with you for 24 hours.        You have received a list of surgical assistants for your reference.  If you have any questions please call Pre-Admission Testing at [phone].  Deductibles and co-payments are collected on the day of service. Please be prepared to pay the required co-pay, deductible or deposit on the day of service as defined by your plan.

## 2019-08-07 DIAGNOSIS — C20 ADENOCARCINOMA OF RECTUM (HCC): ICD-10-CM

## 2019-08-07 PROBLEM — Z45.2 ENCOUNTER FOR FITTING AND ADJUSTMENT OF VASCULAR CATHETER: Status: ACTIVE | Noted: 2019-08-07

## 2019-08-07 RX ORDER — SODIUM CHLORIDE 0.9 % (FLUSH) 0.9 %
10 SYRINGE (ML) INJECTION AS NEEDED
Status: CANCELLED | OUTPATIENT
Start: 2019-08-07

## 2019-08-08 ENCOUNTER — HOSPITAL ENCOUNTER (OUTPATIENT)
Dept: PET IMAGING | Facility: HOSPITAL | Age: 39
Discharge: HOME | End: 2019-08-08

## 2019-08-08 ENCOUNTER — HOSPITAL ENCOUNTER (OUTPATIENT)
Dept: PET IMAGING | Facility: HOSPITAL | Age: 39
Discharge: HOME | End: 2019-08-08
Admitting: INTERNAL MEDICINE

## 2019-08-08 ENCOUNTER — APPOINTMENT (OUTPATIENT)
Dept: LAB | Facility: HOSPITAL | Age: 39
End: 2019-08-08

## 2019-08-08 ENCOUNTER — OFFICE VISIT (OUTPATIENT)
Dept: ONCOLOGY | Facility: CLINIC | Age: 39
End: 2019-08-08

## 2019-08-08 ENCOUNTER — RESEARCH ENCOUNTER (OUTPATIENT)
Dept: ONCOLOGY | Facility: CLINIC | Age: 39
End: 2019-08-08

## 2019-08-08 VITALS — BODY MASS INDEX: 38.66 KG/M2 | WEIGHT: 239.5 LBS

## 2019-08-08 DIAGNOSIS — C20 ADENOCARCINOMA OF RECTUM (HCC): ICD-10-CM

## 2019-08-08 DIAGNOSIS — C20 ADENOCARCINOMA OF RECTUM (HCC): Primary | ICD-10-CM

## 2019-08-08 LAB — GLUCOSE BLDC GLUCOMTR-MCNC: 88 MG/DL (ref 70–130)

## 2019-08-08 PROCEDURE — 99215 OFFICE O/P EST HI 40 MIN: CPT | Performed by: NURSE PRACTITIONER

## 2019-08-08 PROCEDURE — 78815 PET IMAGE W/CT SKULL-THIGH: CPT

## 2019-08-08 PROCEDURE — 82962 GLUCOSE BLOOD TEST: CPT

## 2019-08-08 PROCEDURE — 0 FLUDEOXYGLUCOSE F18 SOLUTION: Performed by: INTERNAL MEDICINE

## 2019-08-08 PROCEDURE — G0463 HOSPITAL OUTPT CLINIC VISIT: HCPCS | Performed by: NURSE PRACTITIONER

## 2019-08-08 PROCEDURE — A9552 F18 FDG: HCPCS | Performed by: INTERNAL MEDICINE

## 2019-08-08 RX ADMIN — FLUDEOXYGLUCOSE F18 1 DOSE: 300 INJECTION INTRAVENOUS at 12:13

## 2019-08-08 NOTE — PROGRESS NOTES
Subjective     PATIENT NAME:  Carole Weaver  YOB: 1980  PATIENTS AGE:  39 y.o.  PATIENTS SEX:  female  DATE OF SERVICE:  08/08/2019  PROVIDER:  JULIET Griffith      ____________________PATIENT EDUCATION____________________    PATIENT EDUCATION:  Today I met with the patient to discuss the chemotherapy regimen recommended for treatment of her rectal cancer.  The patient was given explanation of treatment premed side effects including office policy that prohibits patients to drive if sedating medications are administered, MD explanation given regarding benefits, side effects, toxicities and goals of treatment.  The patient received a Chemotherapy/Biotherapy Plan Summary including diagnosis and specific treatment plan.    SIDE EFFECTS:  Common side effects were discussed with the patient and her mother. Discussion included hair loss/discoloration, anemia/fatigue, infection/chills/fever, appetite, bleeding risk/precautions, constipation, diarrhea, mouth sores, taste alteration, loss of appetite,nausea/vomiting, peripheral neuropathy, skin/nail changes, rash, muscle aches/weakness, photosensitivity, weight gain/loss, hearing loss, dizziness, menopausal symptoms, menstrrual irregularity, sterility, high blood pressure, heart damage, liver damage, lung damage, kidney damage, DVT/PE risk, fluid retention, pleural/pericardial effusion, somnolence, electrolyte/LFT imbalance.    Discussion also included side effects specific to drugs in the treatment plan, Flourourcil,specifically.    Reproductive risks were discussed, including appropriate use of birth control and protection during sexual relations.    A total of 50 minutes were spent with the patient, with 100% of time spent in education and counseling.      **The patient will need to be scheduled for an additional education on FOLFOX after surgery.  She feels having information about both therapies is too overwhelming in the office today.

## 2019-08-08 NOTE — RESEARCH
[facility] Oncology Research  CONSULTING IN BLOOD DISORDERS & CANCER  Cruzito Jacobs, Marvel, Bib, Kylie, Jaret,  Loyda, Cale, Melanie, Patrick & Lanre & Joshua  [address], [address]  [place], [place] [address]  Phone: [phone]  Fax: [phone]      Patient Name:  Carole Weaver  YOB: 1980  Patient Age:  39 y.o.  Patient's Sex:  female    Date of Service:  08/08/2019    Protocol:  [employer] [id number] Blood Sample Collection to Evaluate Biomarkers in Subjects with Untreated Solid Tumors Study                                                        RESEARCH INFORMED CONSENT                                     The consent form for the above mentioned clinical trial was explained and summarized by the investigator and/or coordinator.  The subject was given a copy of the consent form to read and encouraged to take the copy home to share with family and/or significant others.    The subject was given the opportunity to ask questions of the coordinator and the investigator.    The subject has read and verbalized an understanding of the informed consent.  All questions and concerns were addressed.  she signed and was given a copy of the written informed consent prior to performing any study-related procedures.    Height: 66 inches    VASU Boswell RN    08/08/19, 12:24 PM

## 2019-08-09 ENCOUNTER — APPOINTMENT (OUTPATIENT)
Dept: GENERAL RADIOLOGY | Facility: HOSPITAL | Age: 39
End: 2019-08-09

## 2019-08-09 ENCOUNTER — ANESTHESIA EVENT (OUTPATIENT)
Dept: PERIOP | Facility: HOSPITAL | Age: 39
End: 2019-08-09

## 2019-08-09 ENCOUNTER — HOSPITAL ENCOUNTER (OUTPATIENT)
Facility: HOSPITAL | Age: 39
Setting detail: HOSPITAL OUTPATIENT SURGERY
Discharge: HOME | End: 2019-08-09
Attending: SURGERY | Admitting: SURGERY

## 2019-08-09 ENCOUNTER — ANESTHESIA (OUTPATIENT)
Dept: PERIOP | Facility: HOSPITAL | Age: 39
End: 2019-08-09

## 2019-08-09 VITALS
RESPIRATION RATE: 16 BRPM | TEMPERATURE: 97.6 F | DIASTOLIC BLOOD PRESSURE: 95 MMHG | HEART RATE: 84 BPM | OXYGEN SATURATION: 96 % | SYSTOLIC BLOOD PRESSURE: 157 MMHG

## 2019-08-09 DIAGNOSIS — C20 ADENOCARCINOMA OF RECTUM (HCC): ICD-10-CM

## 2019-08-09 PROCEDURE — 77001 FLUOROGUIDE FOR VEIN DEVICE: CPT | Performed by: SURGERY

## 2019-08-09 PROCEDURE — 25010000002 MIDAZOLAM PER 1 MG: Performed by: ANESTHESIOLOGY

## 2019-08-09 PROCEDURE — 25010000002 MIDAZOLAM PER 1 MG: Performed by: NURSE ANESTHETIST, CERTIFIED REGISTERED

## 2019-08-09 PROCEDURE — 25010000002 ONDANSETRON PER 1 MG: Performed by: NURSE ANESTHETIST, CERTIFIED REGISTERED

## 2019-08-09 PROCEDURE — 25010000003 CEFAZOLIN IN DEXTROSE 2-4 GM/100ML-% SOLUTION: Performed by: SURGERY

## 2019-08-09 PROCEDURE — 25010000002 HEPARIN (PORCINE) PER 1000 UNITS: Performed by: SURGERY

## 2019-08-09 PROCEDURE — 25010000002 PROPOFOL 10 MG/ML EMULSION: Performed by: NURSE ANESTHETIST, CERTIFIED REGISTERED

## 2019-08-09 PROCEDURE — 36561 INSERT TUNNELED CV CATH: CPT | Performed by: SURGERY

## 2019-08-09 PROCEDURE — 25010000002 FENTANYL CITRATE (PF) 100 MCG/2ML SOLUTION: Performed by: NURSE ANESTHETIST, CERTIFIED REGISTERED

## 2019-08-09 PROCEDURE — C1788 PORT, INDWELLING, IMP: HCPCS | Performed by: SURGERY

## 2019-08-09 DEVICE — POWERPORT M.R.I. IMPLANTABLE PORT WITH ATTACHABLE 8F CHRONOFLEX OPEN-ENDED SINGLE-LUMEN VENOUS CATHETER INTERMEDIATE KIT (WITH SUTURE PLUGS)
Type: IMPLANTABLE DEVICE | Site: CHEST | Status: NON-FUNCTIONAL
Brand: POWERPORT M.R.I., CHRONOFLEX
Removed: 2020-12-18

## 2019-08-09 RX ORDER — ACETAMINOPHEN 650 MG/1
650 SUPPOSITORY RECTAL ONCE AS NEEDED
Status: CANCELLED | OUTPATIENT
Start: 2019-08-09

## 2019-08-09 RX ORDER — HYDROMORPHONE HYDROCHLORIDE 1 MG/ML
0.5 INJECTION, SOLUTION INTRAMUSCULAR; INTRAVENOUS; SUBCUTANEOUS
Status: CANCELLED | OUTPATIENT
Start: 2019-08-09 | End: 2019-08-10

## 2019-08-09 RX ORDER — HYDROCODONE BITARTRATE AND ACETAMINOPHEN 5; 325 MG/1; MG/1
TABLET ORAL
Qty: 30 TABLET | Refills: 0 | Status: SHIPPED | OUTPATIENT
Start: 2019-08-09 | End: 2019-10-07

## 2019-08-09 RX ORDER — FENTANYL CITRATE 50 UG/ML
INJECTION, SOLUTION INTRAMUSCULAR; INTRAVENOUS AS NEEDED
Status: DISCONTINUED | OUTPATIENT
Start: 2019-08-09 | End: 2019-08-09 | Stop reason: SURG

## 2019-08-09 RX ORDER — MIDAZOLAM HYDROCHLORIDE 1 MG/ML
INJECTION INTRAMUSCULAR; INTRAVENOUS AS NEEDED
Status: DISCONTINUED | OUTPATIENT
Start: 2019-08-09 | End: 2019-08-09 | Stop reason: SURG

## 2019-08-09 RX ORDER — NALBUPHINE HCL 10 MG/ML
2 AMPUL (ML) INJECTION EVERY 4 HOURS PRN
Status: CANCELLED | OUTPATIENT
Start: 2019-08-09

## 2019-08-09 RX ORDER — HYDROCODONE BITARTRATE AND ACETAMINOPHEN 5; 325 MG/1; MG/1
1 TABLET ORAL ONCE AS NEEDED
Status: CANCELLED | OUTPATIENT
Start: 2019-08-09

## 2019-08-09 RX ORDER — BUPIVACAINE HYDROCHLORIDE AND EPINEPHRINE 5; 5 MG/ML; UG/ML
INJECTION, SOLUTION PERINEURAL AS NEEDED
Status: DISCONTINUED | OUTPATIENT
Start: 2019-08-09 | End: 2019-08-09 | Stop reason: HOSPADM

## 2019-08-09 RX ORDER — PROPOFOL 10 MG/ML
VIAL (ML) INTRAVENOUS CONTINUOUS PRN
Status: DISCONTINUED | OUTPATIENT
Start: 2019-08-09 | End: 2019-08-09 | Stop reason: SURG

## 2019-08-09 RX ORDER — ACETAMINOPHEN 325 MG/1
650 TABLET ORAL ONCE AS NEEDED
Status: CANCELLED | OUTPATIENT
Start: 2019-08-09

## 2019-08-09 RX ORDER — ACETAMINOPHEN 500 MG
500 TABLET ORAL ONCE
Status: COMPLETED | OUTPATIENT
Start: 2019-08-09 | End: 2019-08-09

## 2019-08-09 RX ORDER — PROMETHAZINE HYDROCHLORIDE 25 MG/1
25 SUPPOSITORY RECTAL ONCE AS NEEDED
Status: CANCELLED | OUTPATIENT
Start: 2019-08-09

## 2019-08-09 RX ORDER — LIDOCAINE HYDROCHLORIDE 10 MG/ML
0.5 INJECTION, SOLUTION EPIDURAL; INFILTRATION; INTRACAUDAL; PERINEURAL ONCE AS NEEDED
Status: DISCONTINUED | OUTPATIENT
Start: 2019-08-09 | End: 2019-08-09 | Stop reason: HOSPADM

## 2019-08-09 RX ORDER — LIDOCAINE HYDROCHLORIDE 20 MG/ML
INJECTION, SOLUTION INFILTRATION; PERINEURAL AS NEEDED
Status: DISCONTINUED | OUTPATIENT
Start: 2019-08-09 | End: 2019-08-09 | Stop reason: SURG

## 2019-08-09 RX ORDER — PROMETHAZINE HYDROCHLORIDE 25 MG/1
25 TABLET ORAL ONCE AS NEEDED
Status: CANCELLED | OUTPATIENT
Start: 2019-08-09

## 2019-08-09 RX ORDER — SODIUM CHLORIDE, SODIUM LACTATE, POTASSIUM CHLORIDE, CALCIUM CHLORIDE 600; 310; 30; 20 MG/100ML; MG/100ML; MG/100ML; MG/100ML
INJECTION, SOLUTION INTRAVENOUS CONTINUOUS PRN
Status: DISCONTINUED | OUTPATIENT
Start: 2019-08-09 | End: 2019-08-09 | Stop reason: SURG

## 2019-08-09 RX ORDER — SODIUM CHLORIDE 0.9 % (FLUSH) 0.9 %
1-10 SYRINGE (ML) INJECTION AS NEEDED
Status: DISCONTINUED | OUTPATIENT
Start: 2019-08-09 | End: 2019-08-09 | Stop reason: HOSPADM

## 2019-08-09 RX ORDER — SODIUM CHLORIDE, SODIUM LACTATE, POTASSIUM CHLORIDE, CALCIUM CHLORIDE 600; 310; 30; 20 MG/100ML; MG/100ML; MG/100ML; MG/100ML
9 INJECTION, SOLUTION INTRAVENOUS CONTINUOUS
Status: DISCONTINUED | OUTPATIENT
Start: 2019-08-09 | End: 2019-08-09 | Stop reason: HOSPADM

## 2019-08-09 RX ORDER — FENTANYL CITRATE 50 UG/ML
50 INJECTION, SOLUTION INTRAMUSCULAR; INTRAVENOUS
Status: CANCELLED | OUTPATIENT
Start: 2019-08-09

## 2019-08-09 RX ORDER — NALBUPHINE HCL 10 MG/ML
10 AMPUL (ML) INJECTION EVERY 4 HOURS PRN
Status: CANCELLED | OUTPATIENT
Start: 2019-08-09

## 2019-08-09 RX ORDER — ONDANSETRON 2 MG/ML
INJECTION INTRAMUSCULAR; INTRAVENOUS AS NEEDED
Status: DISCONTINUED | OUTPATIENT
Start: 2019-08-09 | End: 2019-08-09 | Stop reason: SURG

## 2019-08-09 RX ORDER — NALOXONE HCL 0.4 MG/ML
0.4 VIAL (ML) INJECTION AS NEEDED
Status: CANCELLED | OUTPATIENT
Start: 2019-08-09

## 2019-08-09 RX ORDER — MIDAZOLAM HYDROCHLORIDE 1 MG/ML
1 INJECTION INTRAMUSCULAR; INTRAVENOUS
Status: DISCONTINUED | OUTPATIENT
Start: 2019-08-09 | End: 2019-08-09 | Stop reason: HOSPADM

## 2019-08-09 RX ORDER — SODIUM CHLORIDE 0.9 % (FLUSH) 0.9 %
3 SYRINGE (ML) INJECTION EVERY 12 HOURS SCHEDULED
Status: DISCONTINUED | OUTPATIENT
Start: 2019-08-09 | End: 2019-08-09 | Stop reason: HOSPADM

## 2019-08-09 RX ORDER — DIPHENHYDRAMINE HYDROCHLORIDE 50 MG/ML
12.5 INJECTION INTRAMUSCULAR; INTRAVENOUS
Status: CANCELLED | OUTPATIENT
Start: 2019-08-09

## 2019-08-09 RX ORDER — CEFAZOLIN SODIUM 2 G/100ML
2 INJECTION, SOLUTION INTRAVENOUS ONCE
Status: COMPLETED | OUTPATIENT
Start: 2019-08-09 | End: 2019-08-09

## 2019-08-09 RX ORDER — ONDANSETRON 4 MG/1
4 TABLET, FILM COATED ORAL EVERY 6 HOURS PRN
Qty: 10 TABLET | Refills: 1 | Status: SHIPPED | OUTPATIENT
Start: 2019-08-09 | End: 2019-09-16 | Stop reason: ALTCHOICE

## 2019-08-09 RX ORDER — MIDAZOLAM HYDROCHLORIDE 1 MG/ML
2 INJECTION INTRAMUSCULAR; INTRAVENOUS
Status: DISCONTINUED | OUTPATIENT
Start: 2019-08-09 | End: 2019-08-09 | Stop reason: HOSPADM

## 2019-08-09 RX ORDER — HYDRALAZINE HYDROCHLORIDE 20 MG/ML
5 INJECTION INTRAMUSCULAR; INTRAVENOUS
Status: CANCELLED | OUTPATIENT
Start: 2019-08-09

## 2019-08-09 RX ORDER — PROMETHAZINE HYDROCHLORIDE 25 MG/ML
6.25 INJECTION, SOLUTION INTRAMUSCULAR; INTRAVENOUS ONCE AS NEEDED
Status: CANCELLED | OUTPATIENT
Start: 2019-08-09

## 2019-08-09 RX ORDER — MAGNESIUM HYDROXIDE 1200 MG/15ML
LIQUID ORAL AS NEEDED
Status: DISCONTINUED | OUTPATIENT
Start: 2019-08-09 | End: 2019-08-09 | Stop reason: HOSPADM

## 2019-08-09 RX ADMIN — CEFAZOLIN SODIUM 2 G: 2 INJECTION, SOLUTION INTRAVENOUS at 11:47

## 2019-08-09 RX ADMIN — FENTANYL CITRATE 100 MCG: 50 INJECTION INTRAMUSCULAR; INTRAVENOUS at 11:47

## 2019-08-09 RX ADMIN — SODIUM CHLORIDE, POTASSIUM CHLORIDE, SODIUM LACTATE AND CALCIUM CHLORIDE 9 ML/HR: 600; 310; 30; 20 INJECTION, SOLUTION INTRAVENOUS at 10:49

## 2019-08-09 RX ADMIN — MIDAZOLAM 2 MG: 1 INJECTION INTRAMUSCULAR; INTRAVENOUS at 10:59

## 2019-08-09 RX ADMIN — SODIUM CHLORIDE, POTASSIUM CHLORIDE, SODIUM LACTATE AND CALCIUM CHLORIDE: 600; 310; 30; 20 INJECTION, SOLUTION INTRAVENOUS at 11:47

## 2019-08-09 RX ADMIN — PROPOFOL 140 MCG/KG/MIN: 10 INJECTION, EMULSION INTRAVENOUS at 11:47

## 2019-08-09 RX ADMIN — SODIUM CHLORIDE, POTASSIUM CHLORIDE, SODIUM LACTATE AND CALCIUM CHLORIDE: 600; 310; 30; 20 INJECTION, SOLUTION INTRAVENOUS at 12:15

## 2019-08-09 RX ADMIN — EPHEDRINE SULFATE 10 MG: 50 INJECTION INTRAMUSCULAR; INTRAVENOUS; SUBCUTANEOUS at 12:20

## 2019-08-09 RX ADMIN — ONDANSETRON 4 MG: 2 INJECTION INTRAMUSCULAR; INTRAVENOUS at 12:20

## 2019-08-09 RX ADMIN — EPHEDRINE SULFATE 10 MG: 50 INJECTION INTRAMUSCULAR; INTRAVENOUS; SUBCUTANEOUS at 12:25

## 2019-08-09 RX ADMIN — ACETAMINOPHEN 500 MG: 500 TABLET, FILM COATED ORAL at 10:58

## 2019-08-09 RX ADMIN — MIDAZOLAM 2 MG: 1 INJECTION INTRAMUSCULAR; INTRAVENOUS at 11:47

## 2019-08-09 RX ADMIN — LIDOCAINE HYDROCHLORIDE 100 MG: 20 INJECTION, SOLUTION INFILTRATION; PERINEURAL at 11:47

## 2019-08-09 NOTE — DISCHARGE INSTRUCTIONS
Dr. Sj Joseph  [address]  [place] [address]  [phone]    Discharge Instructions for Port Placement      1. Go home, rest and take it easy today; however, you should get up and move about several times today to reduce the risk of developing a clot in your legs.      2. You may experience some dizziness or memory loss from the anesthesia.  This may last for the next 24 hours.  Someone should plan on staying with you for the first 24 hours for your safety.    3. Do not make any important legal decisions or sign any legal papers for the next 24 hours.      4. Eat and drink lightly today.  Start off with liquids, jello, soup, crackers or other bland foods at first. You may advance your diet tomorrow as tolerated as long as you do not experience any nausea or vomiting.     5. You may remove your outer dressings in 3-4 days or until your first treatment whichever comes first. If you remove the dressing before your first treatment, please leave the little white tapes known as steri-strips alone.  They usually fall off in 1-2 weeks.  Do not worry if they come off sooner. If skin glue(Dermabond) was used, your incision is covered and protected. The invisible glue will dissolve on its own as your incision heals.    6. You may notice some bleeding/drainage on your outer dressings. A little bloody drainage is normal. If the bleeding/drainage is such that the bandage cannot absorb it, remove the dressing, apply clean gauze and apply firm pressure for a full 15 minutes.  If the bleeding continues, please call me.    7. You may shower tomorrow allowing water to run over the incisions; however, do not scrub the incision.  No tub baths until your incisions are completely healed.    8. You have received a prescription for a narcotic pain medicine, as you may have some pain/discomfort following surgery.   You will not be totally pain free, but your pain medicine should make the pain tolerable.  Please  take your pain medicine as prescribed and always take your pills with food to prevent nausea. If you are having severe pain that cannot be controlled by the pain medicine, please contact me.  If the pain is such that narcotic pain medicine is not required, you may take Tylenol or Ibuprofen as directed unless indicated otherwise.      9. You have also received a prescription for an anti-nausea medicine.  Please take this as prescribed for any nausea or vomiting.  Nausea could be a result of the anesthesia or a result of the narcotic pain medicine.  If you experience severe nausea and vomiting that cannot be controlled by the nausea medicine, please call me.      10. No driving for 24 hours and for as long as you are taking your prescription pain medicine.        11. If the port is to be used within 10-14 days of placement, no surgical follow-up is needed.  Otherwise, you should call the office at [phone] to schedule a follow-up in about 1 week.              12. Remember to contact me for any of the following:    • Fever> 101 degrees  • Severe pain that cannot be controlled by taking your pain pills  • Severe nausea or vomiting that cannot be controlled by taking your nausea medicine  • Significant bleeding from your incision  • Drainage that has a bad smell or is yellow or green in appearance  • Any other questions or concerns

## 2019-08-09 NOTE — ANESTHESIA PREPROCEDURE EVALUATION
Anesthesia Evaluation     no history of anesthetic complications:  NPO Solid Status: > 8 hours             Airway   Mallampati: III  TM distance: >3 FB  Neck ROM: full  Possible difficult intubation  Dental - normal exam     Pulmonary - negative pulmonary ROS and normal exam   Cardiovascular - normal exam    Rhythm: regular    (+) hypertension,       Neuro/Psych  (+) psychiatric history Anxiety,     GI/Hepatic/Renal/Endo    (+)  GERD,      Musculoskeletal     Abdominal    Substance History      OB/GYN    (+) Pregnant,         Other                        Anesthesia Plan    ASA 2     MAC   (D/W pt. MAC and possible awareness intra op.  Pt understands MAC and GA are not the same and the possibility of GA being required for failed MAC)  intravenous induction

## 2019-08-09 NOTE — OP NOTE
PREOPERATIVE DIAGNOSIS:  Rectal cancer    POSTOPERATIVE DIAGNOSIS AND FINDINGS:  same    PROCEDURE:  Left subclavian 8 [place] PowerPort placement with fluoroscopic guidance    SURGEON:  Sj Joseph MD    ASSISTANT:  none    ANESTHESIA:  MAC    EBL:  Minimal    SPECIMEN:  none    DESCRIPTION:  In supine position prepped & draped usual sterile manner.  Half percent Marcaine with epinephrine infiltrated locally.  Left subclavian vein accessed with 18-gauge needle, guidewire inserted under fluoroscopic guidance into the superior vena cava.  Subcutaneous pocket created with electrocautery. Vein dilator and sheath introduced, dilator and guidewire removed.  Port inserted to 20 cm depth, position of tip confirmed in superior vena cava with fluoroscopic guidance.  Venous blood easily aspirated, heparinized saline flushed.  Good hemostasis noted, skin closed with 3-0 Vicryl deep dermal and 5-0 Vicryl subcuticular.  Tolerated well, stable to recovery area.    Sj Joseph M.D.

## 2019-08-09 NOTE — ANESTHESIA POSTPROCEDURE EVALUATION
Patient: Carole Weaver    Procedure Summary     Date:  08/09/19 Room / Location:   [facility]    Anesthesia Start:  1147 Anesthesia Stop:  1232    Procedure:  INSERTION VENOUS ACCESS DEVICE (Left Abdomen) Diagnosis:       Adenocarcinoma of rectum (CMS/HCC)      (Adenocarcinoma of rectum (CMS/HCC) [C20])    Surgeon:  Sj Joseph MD Provider:  Mckay Potts MD    Anesthesia Type:  MAC ASA Status:  2          Anesthesia Type: MAC  Last vitals  BP   115/83 (08/09/19 1232)   Temp   36.4 °C (97.6 °F) (08/09/19 1004)   Pulse   94 (08/09/19 1232)   Resp   16 (08/09/19 1232)     SpO2   96 % (08/09/19 1232)     Post Anesthesia Care and Evaluation    Patient location during evaluation: bedside  Patient participation: complete - patient participated  Level of consciousness: awake  Pain score: 2  Pain management: adequate  Airway patency: patent  Anesthetic complications: No anesthetic complications    Cardiovascular status: acceptable  Respiratory status: acceptable  Hydration status: acceptable    Comments: /83 (BP Location: Left arm, Patient Position: Lying)   Pulse 94   Temp 36.4 °C (97.6 °F) (Oral)   Resp 16   LMP 07/16/2019   SpO2 96%

## 2019-08-09 NOTE — H&P
HPI: Rectal cancer    PMH, PSH, MEDS AND ALLERGIES reviewed and reconciled with EPIC    PHYSICAL EXAM:  -  Constitutional:  no acute distress  -  Respiratory:  normal inspiratory effort  -  Cardiovascular: regular rate  -  Gastrointestinal: Soft    ASSESSMENT/PLAN:    PowerPort placement    Sj Joseph M.D.

## 2019-08-12 ENCOUNTER — INFUSION (OUTPATIENT)
Dept: ONCOLOGY | Facility: HOSPITAL | Age: 39
End: 2019-08-12

## 2019-08-12 ENCOUNTER — OFFICE VISIT (OUTPATIENT)
Dept: ONCOLOGY | Facility: CLINIC | Age: 39
End: 2019-08-12

## 2019-08-12 VITALS
TEMPERATURE: 98 F | HEART RATE: 80 BPM | BODY MASS INDEX: 38.28 KG/M2 | HEIGHT: 66 IN | WEIGHT: 238.2 LBS | SYSTOLIC BLOOD PRESSURE: 113 MMHG | DIASTOLIC BLOOD PRESSURE: 82 MMHG | RESPIRATION RATE: 16 BRPM | OXYGEN SATURATION: 96 %

## 2019-08-12 DIAGNOSIS — C20 ADENOCARCINOMA OF RECTUM (HCC): Primary | ICD-10-CM

## 2019-08-12 DIAGNOSIS — D68.51 HETEROZYGOUS FACTOR V LEIDEN MUTATION (HCC): ICD-10-CM

## 2019-08-12 DIAGNOSIS — C20 ADENOCARCINOMA OF RECTUM (HCC): ICD-10-CM

## 2019-08-12 LAB
ALBUMIN SERPL-MCNC: 3.4 G/DL (ref 3.5–5.2)
ALBUMIN/GLOB SERPL: 1.2 G/DL (ref 1.1–2.4)
ALP SERPL-CCNC: 78 U/L (ref 38–116)
ALT SERPL W P-5'-P-CCNC: 14 U/L (ref 0–33)
ANION GAP SERPL CALCULATED.3IONS-SCNC: 11.7 MMOL/L (ref 5–15)
AST SERPL-CCNC: 13 U/L (ref 0–32)
BASOPHILS # BLD AUTO: 0.02 10*3/MM3 (ref 0–0.2)
BASOPHILS NFR BLD AUTO: 0.3 % (ref 0–1.5)
BILIRUB SERPL-MCNC: 0.3 MG/DL (ref 0.2–1.2)
BUN BLD-MCNC: 7 MG/DL (ref 6–20)
BUN/CREAT SERPL: 9.7 (ref 7.3–30)
CALCIUM SPEC-SCNC: 8.7 MG/DL (ref 8.5–10.2)
CHLORIDE SERPL-SCNC: 102 MMOL/L (ref 98–107)
CO2 SERPL-SCNC: 25.3 MMOL/L (ref 22–29)
CREAT BLD-MCNC: 0.72 MG/DL (ref 0.6–1.1)
DEPRECATED RDW RBC AUTO: 41.8 FL (ref 37–54)
EOSINOPHIL # BLD AUTO: 0.17 10*3/MM3 (ref 0–0.4)
EOSINOPHIL NFR BLD AUTO: 2.2 % (ref 0.3–6.2)
ERYTHROCYTE [DISTWIDTH] IN BLOOD BY AUTOMATED COUNT: 12.8 % (ref 12.3–15.4)
GFR SERPL CREATININE-BSD FRML MDRD: 90 ML/MIN/1.73
GLOBULIN UR ELPH-MCNC: 2.9 GM/DL (ref 1.8–3.5)
GLUCOSE BLD-MCNC: 126 MG/DL (ref 74–124)
HCT VFR BLD AUTO: 42.9 % (ref 34–46.6)
HGB BLD-MCNC: 14.1 G/DL (ref 12–15.9)
IMM GRANULOCYTES # BLD AUTO: 0.02 10*3/MM3 (ref 0–0.05)
IMM GRANULOCYTES NFR BLD AUTO: 0.3 % (ref 0–0.5)
LYMPHOCYTES # BLD AUTO: 1.86 10*3/MM3 (ref 0.7–3.1)
LYMPHOCYTES NFR BLD AUTO: 24.6 % (ref 19.6–45.3)
MCH RBC QN AUTO: 29.3 PG (ref 26.6–33)
MCHC RBC AUTO-ENTMCNC: 32.9 G/DL (ref 31.5–35.7)
MCV RBC AUTO: 89 FL (ref 79–97)
MONOCYTES # BLD AUTO: 0.39 10*3/MM3 (ref 0.1–0.9)
MONOCYTES NFR BLD AUTO: 5.2 % (ref 5–12)
NEUTROPHILS # BLD AUTO: 5.1 10*3/MM3 (ref 1.7–7)
NEUTROPHILS NFR BLD AUTO: 67.4 % (ref 42.7–76)
NRBC BLD AUTO-RTO: 0 /100 WBC (ref 0–0.2)
PLATELET # BLD AUTO: 322 10*3/MM3 (ref 140–450)
PMV BLD AUTO: 9.1 FL (ref 6–12)
POTASSIUM BLD-SCNC: 3.6 MMOL/L (ref 3.5–4.7)
PROT SERPL-MCNC: 6.3 G/DL (ref 6.3–8)
RBC # BLD AUTO: 4.82 10*6/MM3 (ref 3.77–5.28)
SODIUM BLD-SCNC: 139 MMOL/L (ref 134–145)
WBC NRBC COR # BLD: 7.56 10*3/MM3 (ref 3.4–10.8)

## 2019-08-12 PROCEDURE — 77014 CHG CT GUIDANCE RADIATION THERAPY FLDS PLACEMENT: CPT | Performed by: RADIOLOGY

## 2019-08-12 PROCEDURE — 77386: CPT | Performed by: RADIOLOGY

## 2019-08-12 PROCEDURE — 77338 DESIGN MLC DEVICE FOR IMRT: CPT | Performed by: RADIOLOGY

## 2019-08-12 PROCEDURE — 77427 RADIATION TX MANAGEMENT X5: CPT | Performed by: RADIOLOGY

## 2019-08-12 PROCEDURE — 80053 COMPREHEN METABOLIC PANEL: CPT

## 2019-08-12 PROCEDURE — 77300 RADIATION THERAPY DOSE PLAN: CPT | Performed by: RADIOLOGY

## 2019-08-12 PROCEDURE — 77386 CHG INTENSITY MODULATED RADIATION TX DLVR COMPLEX: CPT | Performed by: RADIOLOGY

## 2019-08-12 PROCEDURE — 25010000002 FLUOROURACIL PER 500 MG: Performed by: INTERNAL MEDICINE

## 2019-08-12 PROCEDURE — 77301 RADIOTHERAPY DOSE PLAN IMRT: CPT | Performed by: RADIOLOGY

## 2019-08-12 PROCEDURE — 96416 CHEMO PROLONG INFUSE W/PUMP: CPT | Performed by: NURSE PRACTITIONER

## 2019-08-12 PROCEDURE — 99213 OFFICE O/P EST LOW 20 MIN: CPT | Performed by: NURSE PRACTITIONER

## 2019-08-12 PROCEDURE — 85025 COMPLETE CBC W/AUTO DIFF WBC: CPT

## 2019-08-12 RX ADMIN — FLUOROURACIL 2420 MG: 50 INJECTION, SOLUTION INTRAVENOUS at 09:24

## 2019-08-12 NOTE — PROGRESS NOTES
.     REASON FOR CONSULTATION:     Provide an opinion on any further workup or treatment of newly diagnosed rectal cancer.                              REQUESTING PHYSICIAN: Padmaja Ye MD     RECORDS OBTAINED:  Records of the patients history including those obtained from the referring provider were reviewed and summarized in detail.    HISTORY OF PRESENT ILLNESS:  The patient is a 39 y.o. year old female for newly diagnosed rectal cancer.    The patient reports she has intermittent rectal bleeding and urgency, mucous with her stool, starting sometime in 2016. At that time she was referred to GI service, and was diagnosed of irritable bowel syndrome. Nevertheless she had worsening urgency for bowel movement, and had a couple of incidents losing control of stool. She was recently seen by Roland Thorpe MD again and had colonoscopy and EGD exam on 07/08/2019. She was found having a circumferential rectal mass. According to the procedure note, the patient had a fungating circumferential bleeding 4 cm mass in the middle rectum, at distance between 13 cm and 17 cm from the anus. Mass was causing partial obstruction. There were also colon polyps found at the hepatic flexure and also at the rectosigmoid junction 23 cm from the anus. Both were resected and retrieved. EGD examination reported grade A esophagitis at the GE junction and patchy discontinuous erythema and aggravation of the mucosa without bleeding in the stomach body. There is normal mucosa of the duodenum. Pathology evaluation from this procedure reported moderately differentiated adenocarcinoma involving the rectal mass. The rectal sigmoid junction polyp was tubular/tubulovillous adenoma with high grade dysplasia negative for invasive malignancy. The hepatic flexure polyp was also tubular/tubulovillous adenoma negative for high grade dysplasia or malignancy. The biopsy from the esophagus reports squamocolumnar mucosa with inflammatory changes suggestive  of mild reflux esophagitis, negative for interstitial metaplasia. Gastric biopsy was benign and duodenal biopsy was also benign. There is no mention of H-pylori.     Patient was subsequently referred to colorectal surgeon Padmaja Ye MD for further evaluation. The patient had CT scan examination for chest, abdomen and pelvis requested by Dr. Ye and were done on 07/13/2019. The study reported no evidence of lymphadenopathy in the abdomen and pelvis. There was wall thickening of the rectosigmoid junction. Normal spleen, pancreas, adrenal glands and kidneys. There was fatty liver infiltration but no focal lymphatic lesions. There was a small 6-7 mm noncalcified nodule in the right upper lobe and a tiny 3 mm subpleural nodule in the right middle lobe. No mediastinal or hilar lymphadenopathy.     Dr. Ye performed staging on the rectal ultrasound examination. This study reported tumor penetrating through the muscularis propria as T3 disease; there were no lymph nodes identified.    The patient comes to the office today to initiate her first cycle of combined chemoradiation with infusional 5FU. She has undergone formal chemotherapy education and has had a port placed by Jake. She is feeling well, overall. She denies any difficulty with eating or her bowels. She continues on Lovenox 40 mg daily for positive heterozygous factor V Leiden. She is experiencing some slight tenderness at her injection regions on her abdomen, though this has been manageable.  She denies any excess bleeding or bruising.  No chest pain or associated shortness of breath.Labs are all acceptable for treatment.               Past Medical History:   Diagnosis Date   • Abnormal Pap smear of cervix 02/02/1998    JULIUS I   • Anxiety    • Cervical lymphadenitis 06/06/2012    SEEN AT [facility] ER   • Chronic diarrhea    • Colon cancer (CMS/HCC)     Stage III   ( STARTS RADIATION AND CHEMO ON 8-12-19 )   • Colon polyps    • Depression    • Factor V Leiden  (CMS/HCC)     DX 2019   • GERD (gastroesophageal reflux disease)    • Hematochezia    • Hemorrhoids    • History of TB skin testing 2009    TB Skin Test   • HPV in female    • Infected insect bite of neck 2016    SEEN AT [facility]   • Irritable bowel syndrome    • Kidney stones 2007    SEEN AT [facility] ER   • Lumbar disc disease    • PIH (pregnancy induced hypertension)    • Rectal cancer (CMS/HCC) 2019    INVASIVE MODERATELY DIFFERENTIATED ADENOCARCINOMA   • Right leg pain    • SAB (spontaneous ) 1996     A2-1 INDUCED   • Sepsis due to cellulitis (CMS/HCC) 2002    LEFT GREAT TOE, ADMITTED TO [facility]     Past Surgical History:   Procedure Laterality Date   • CHOLECYSTECTOMY N/A 10/10/2003    LAPAROSCOPIC WITH CHOLANGIOGRAM, DR. JAMEY TALAVERA AT [facility]   • COLONOSCOPY W/ POLYPECTOMY N/A 2019    15 MM TUBULOVILLOUS ADENOMA POLYP IN HEPATIC FLEXURE, 20 MMTUBULOVILLOUS ADENOMA WITH HIGH GRADE DYSPLASIA IN RECTOSIGMOID, 4 CM MASS IN MID RECTUM, PATH: INVASIVE MODERATELY DIFFERENTIATED ADENOCARCINOMA, DR. JENNIFER LI AT [facility]   • DILATATION AND EVACUATION N/A    • ENDOSCOPY N/A 2019    LA GRADE A ESOPHAGITIS, GASTRITIS, ALL BIOPSIES BENIGN, DR. JENNIFER LI AT [facility]   • PAP SMEAR N/A 2014   • SIGMOIDOSCOPY N/A 2019    INFILTRATIVE PARTIALLY OBSTRUCTING LARGE RECTAL CANCER, AREA TATOOED, DR. GERONIMO ESPARZA AT [facility]   • TRANSRECTAL ULTRASOUND N/A 2019    Procedure: ULTRASOUND TRANSRECTAL;  Surgeon: Geronimo Esparza MD;  Location: [facility] ENDOSCOPY;  Service: General   • VAGINAL DELIVERY N/A 1998   • WISDOM TOOTH EXTRACTION         HEMATOLOGIC/ONCOLOGIC HISTORY:  (History from previous dates can be found in the separate document.)  See HPI.    MEDICATIONS    Current Outpatient Medications:   •  clonazePAM (KLONOPIN) 0.5 MG tablet, Take 1 tablet by mouth 2 (Two) Times a Day As Needed for Seizures., Disp: 60  tablet, Rfl: 5  •  dicyclomine (BENTYL) 20 MG tablet, Take 1 tablet by mouth Every 6 (Six) Hours. (Patient taking differently: Take 20 mg by mouth As Needed.), Disp: 120 tablet, Rfl: 6  •  enoxaparin (LOVENOX) 40 MG/0.4ML solution syringe, Inject 0.4 mL under the skin into the appropriate area as directed Every 12 (Twelve) Hours., Disp: 12 mL, Rfl: 11  •  escitalopram (LEXAPRO) 20 MG tablet, Take 1 tablet by mouth Daily., Disp: 90 tablet, Rfl: 3  •  HYDROcodone-acetaminophen (NORCO) 5-325 MG per tablet, 1-2 by mouth every four hours as needed for pain, Disp: 30 tablet, Rfl: 0  •  ondansetron (ZOFRAN) 4 MG tablet, Take 1 tablet by mouth Every 6 (Six) Hours As Needed for Nausea or Vomiting for up to 10 doses., Disp: 10 tablet, Rfl: 1  •  ondansetron (ZOFRAN) 8 MG tablet, Take 1 tablet by mouth 3 (Three) Times a Day As Needed for Nausea or Vomiting., Disp: 30 tablet, Rfl: 5    ALLERGIES:   No Known Allergies    SOCIAL HISTORY:       Social History     Socioeconomic History   • Marital status:      Spouse name: Justus   • Number of children: 1   • Years of education: Not on file   • Highest education level:  College   Occupational History     Employer: [employer]   Tobacco Use   • Smoking status: Current Every Day Smoker     Packs/day: 1.50     Years: 20.00     Pack years: 30.00     Types: Electronic Cigarette   • Smokeless tobacco: Never Used   Substance and Sexual Activity   • Alcohol use: Yes     Comment: Rarely   • Drug use: No   • Sexual activity: Defer         FAMILY HISTORY:     Mother has positive factor V Leiden mutation, although she did not have thrombosis, mother also is coronary disease with stenting, she also is occasional bruising.  Maternal grandmother had DVT, she had multiple surgical procedures.  Patient mother's half-brother had metastatic colon cancer at diagnosis in his 50s.  Maternal great aunt has breast cancer.  Patient will follow his skin cancer in his 60s, exclusive.    REVIEW OF  SYSTEMS:  Review of Systems   Constitutional: Positive for unexpected weight change (12 pound weight loss being 6 to 12 months. weight has been reasonably stable recently ). Negative for appetite change, diaphoresis, fatigue and fever.   HENT: Negative for congestion, mouth sores and rhinorrhea.    Eyes: Negative for visual disturbance.   Respiratory: Negative for cough and shortness of breath.    Cardiovascular: Negative for chest pain, palpitations and leg swelling.   Gastrointestinal: Positive for anal bleeding (not reported today ) and blood in stool (stools regular today ). Negative for abdominal distention, abdominal pain, diarrhea and nausea.   Endocrine: Positive for cold intolerance.   Genitourinary: Negative for dysuria and frequency.   Musculoskeletal: Negative for arthralgias, back pain and joint swelling.   Skin: Negative.    Allergic/Immunologic: Negative for immunocompromised state.   Neurological: Negative for dizziness, numbness and headaches.   Hematological: Negative for adenopathy. Bruises/bleeds easily (see HPI ).   Psychiatric/Behavioral: Negative for agitation and confusion.              There were no vitals filed for this visit.  Current Status 8/1/2019   ECOG score 0      PHYSICAL EXAM:      CONSTITUTIONAL:  Vital signs reviewed.  Well-developed, morbidly obese BMI 39.4, no distress, looks comfortable.  EYES:  Conjunctiva and lids unremarkable.  PERRLA  EARS,NOSE,MOUTH,THROAT:  Ears and nose appear unremarkable.  Lips, teeth, gums appear unremarkable.  RESPIRATORY:  Normal respiratory effort.  Lungs clear to auscultation bilaterally.  CARDIOVASCULAR: Normal rhythm and rate.  Normal S1, S2.  No murmurs rubs or gallops.  No significant lower extremity edema.  GASTROINTESTINAL: Obese, abdomen appears unremarkable.  Nontender.  No hepatomegaly.  No splenomegaly.  Normal bowel sounds. Slight bruising on right lower quadrant secondary to lovenox shots   LYMPHATIC:  No cervical, supraclavicular,  axillary or groin lymphadenopathy.  MUSCULOSKELETAL:  Unremarkable gait and station.  Unremarkable digits/nails.  No cyanosis or clubbing.  SKIN:  Warm.  No rashes.  PSYCHIATRIC:  Normal judgment and insight.  Normal mood and affect.      RECENT LABS:        WBC   Date Value Ref Range Status   08/01/2019 9.07 3.40 - 10.80 10*3/mm3 Final     Hemoglobin   Date Value Ref Range Status   08/01/2019 14.8 12.0 - 15.9 g/dL Final     Platelets   Date Value Ref Range Status   08/01/2019 389 140 - 450 10*3/mm3 Final       IMAGE STUDY:   CT OF THE CHEST ABDOMEN AND PELVIS WITH CONTRAST 07/19/2019.     HISTORY: Rectal cancer. Staging.     Axial images were obtained from the lung apices to the symphysis pubis  after intravenous and oral contrast.     In the right upper lobe on image 29 is an approximately 6 mm to 7 mm  noncalcified nodule. A tiny 3 mm subpleural nodule is seen in the right  middle lobe on image 58. No other lung nodules are seen.     No significant pulmonary infiltrates are seen. No pathologically  enlarged hilar or mediastinal lymph nodes are seen.     There is fatty infiltration of the liver. No focal hepatic lesions are  seen. Gallbladder has been removed.     The spleen, pancreas, adrenals and kidneys appear within normal limits.     There is wall thickening of the rectosigmoid junction which could  represent patient's known carcinoma.     No lymphadenopathy is seen in the abdomen and pelvis.     IMPRESSION:  1. A 6 mm to 7 mm noncalcified nodule in the right upper lobe. This is  indeterminate. Another tiny subpleural nodule in the right middle lobe  is seen as well. Suggest correlation to any prior outside CT scans of  the chest. If none are available, short-term followup CT of the chest in  approximately 3 months suggested.  2. Wall thickening of the rectosigmoid junction as described.  3. Mild fatty infiltration of the liver.  4. No definite evidence of metastatic disease in the abdomen and pelvis.    "  Pathology:  Tissue Pathology Exam: [id number]   Order: [id number]   Collected:  7/24/2019 07:49 Status:  Final result   Visible to patient:  No (Not Released) Dx:  Rectal cancer (CMS/HCC)   Component    Final Diagnosis   1. Rectum, \"Mass,\" Biopsy:               A. INVASIVE MODERATELY DIFFERENTIATED ADENOCARCINOMA (see Comment).    Electronically signed by Jania Perez MD on 7/25/2019 at 1442   Comment    This case was shared in internal consultation with Dr. Michel who concurs.    Gross Description    1.  The specimen is received in formalin labeled with the patient's name and further designated 'rectal mass biopsy' are multiple small fragments of gray-tan tissue. The specimen is submitted for embedding as received.                 Assessment/Plan      Adenocarcinoma of rectum (CMS/HCC)  1.  The patient is a 38-year-old female who has newly diagnosed rectal cancer, rectal ultrasound examination reported T3N0 disease without lymphadenopathy. CT scan of chest, abdomen and pelvis reported no lymphadenopathy in the abdomen, pelvis or chest. She does have small pulmonary nodule 6-7 mm and 2 mm with indeterminate feature. There is no solid evidence of metastatic disease otherwise. Based on the current imaging studies, this patient has stage IIA (T3N0M0) disease.  She will need PET scan examination for further evaluation prior to starting concurrent chemoradiation therapy.     The patient returns today, August 12, 2019 to initiate neoadjuvant chemoradiation with infusional 5-FU.  He was subsequently undergo surgical resection and then proceed with 8 cycles of FOLFOX 6.    I have again reviewed possible side effects for which she should call our office including uncontrolled nausea, vomiting, diarrhea, or constipation.  She also knows to call the office for a fever greater than 100.5.  I reiterated the expected cold-induced sensitivity for at least 5 days following therapy.  Her labs are all adequate to proceed.  She " will return in 1 week for nurse practitioner visit with myself and her second week of the 5-FU and radiation.    2.  This patient has also strong family history for malignancy and I recommended to refer her for genetic counseling.  An appointment has been scheduled.    3.  Family history of positive factor V Leiden mutation with her mother, who never had thrombophilia.  This was checked prior to starting her on female hormonal supplementation.    The patient is heterozygous factor V Leiden and therefore will require low-dose anticoagulation with Lovenox, 40 mg daily given her increased risk of thrombosis with Medi[employer] and GI malignancy.  Overall, she is tolerating this well thus far.  She has noted some slight discomfort at the injection sites and we have discussed the importance of rotating her shots.  I have also asked her to apply a cool rag to the site should discomfort persist.  At this point, the pain is quite manageable.  She will proceed.    PLAN:  1.  The patient will proceed with her first cycle of combined chemoradiation with infusional 5-FU today  2.  She will continue twice daily Lovenox shots for factor V Leiden positivity  3.  The patient does have an appointment with our genetic counselors on September 3, 2019 for her strong family history of malignancy  4.  The patient return in 1 week for his second dose of infusional 5-FU with radiation.  She will have a nurse practitioner visit with myself prior  5.  We did again briefly discuss possible side effects of chemotherapy and signs or symptoms for which she should call our office, as noted above.    Patient is on drug therapy requiring extensive monitoring.  Additionally, we have discussed possible side effects of chemotherapy and went to call our office with concerns

## 2019-08-13 DIAGNOSIS — C20 ADENOCARCINOMA OF RECTUM (HCC): ICD-10-CM

## 2019-08-13 PROCEDURE — 77014 CHG CT GUIDANCE RADIATION THERAPY FLDS PLACEMENT: CPT | Performed by: RADIOLOGY

## 2019-08-13 PROCEDURE — 77386: CPT | Performed by: RADIOLOGY

## 2019-08-13 PROCEDURE — 77386 CHG INTENSITY MODULATED RADIATION TX DLVR COMPLEX: CPT | Performed by: RADIOLOGY

## 2019-08-14 ENCOUNTER — RADIATION ONCOLOGY WEEKLY ASSESSMENT (OUTPATIENT)
Dept: RADIATION ONCOLOGY | Facility: HOSPITAL | Age: 39
End: 2019-08-14

## 2019-08-14 VITALS
BODY MASS INDEX: 38.74 KG/M2 | DIASTOLIC BLOOD PRESSURE: 85 MMHG | OXYGEN SATURATION: 95 % | WEIGHT: 240 LBS | SYSTOLIC BLOOD PRESSURE: 136 MMHG | HEART RATE: 76 BPM

## 2019-08-14 DIAGNOSIS — C20 ADENOCARCINOMA OF RECTUM (HCC): Primary | ICD-10-CM

## 2019-08-14 PROCEDURE — 77386 CHG INTENSITY MODULATED RADIATION TX DLVR COMPLEX: CPT | Performed by: RADIOLOGY

## 2019-08-14 PROCEDURE — 77014 CHG CT GUIDANCE RADIATION THERAPY FLDS PLACEMENT: CPT | Performed by: RADIOLOGY

## 2019-08-14 PROCEDURE — 77386: CPT | Performed by: RADIOLOGY

## 2019-08-14 NOTE — PROGRESS NOTES
Physician Weekly Management Note    Diagnosis:   Adenocarcinoma of rectum (CMS/HCC) Cancer Staging  Stage IIA (cT3, cN0, cM0)    Reason for Visit: Radiation (3/28)    Concurrent Chemo:   Yes    Notes on Treatment course, Films, Medical progress and Plan:  Doing well with us thus far.   Long discussion about PET scan again. Discussed monitoring of the lung lesion for now - too hard to biopsy and not going to wedge out now. She knows could be metastatic and will continue to be followed. Regarding neck nodes, she is very concerned about those, has had some voice change/sore throat. Will plan to get CT of the head/neck just to be thorough. Will continue on. No problems or questions, cont on.    Performance Status:  (1) Restricted in physically strenuous activity, ambulatory and able to do work of light nature    ROS - Other than as listed above, as follows:  Constitutional - Normal - no complaints of fatigue, denies lack of appetite, fever, night sweats or change in weight.  Cardiovascular - Normal - denies arrhythmias, chest pain, dyspnea, edema, orthopnea or palpitations.  Respiratory - Normal - denies cough, dyspnea, hemoptysis, hiccoughs, pleuritic chest pain or wheezing.  Gastrointestinal - Normal - no complaints of constipation, abdominal pain, diarrhea, heartburn/dyspepsia, hematemesis, hemorrhoids, melena or GI bleeding, nausea, pain or cramping or vomiting.    PHYSICAL EXAM - Other than as listed above, as follows:  Vitals:    08/14/19 1423   BP: 136/85   Pulse: 76   SpO2: 95%   Weight: 109 kg (240 lb)   PainSc: 0-No pain         Constitutional - Normal - no evidence of impaired alertness, inadequate appearance, premature or advanced chronologic age, uncooperativeness, altered mood, affect or disorientation.    Problem added:  No problems updated.  Medications added: No orders of the defined types were placed in this encounter.    Ancillary referrals made:   Orders Placed This Encounter   Procedures   • CT  "Head With Contrast     Standing Status:   Future     Standing Expiration Date:   8/14/2020     Order Specific Question:   STEREOTACTIC GUIDANCE PROTOCOL?     Answer:   No [0]     Order Specific Question:   Will fiducial markers be needed for this procedure?     Answer:   No     Order Specific Question:   Patient Pregnant     Answer:   No   • CT Soft Tissue Neck With Contrast     Standing Status:   Future     Standing Expiration Date:   8/14/2020     Order Specific Question:   Patient Pregnant     Answer:   No       Technical aspects reviewed:  Weekly OBI approved if applicable? Yes  Weekly port films approved?   Yes  Change requests noted if applicable?  Yes  Patient setup and plan reviewed?  Yes    Chart Reviewed:  Continue current treatment plan?  Yes  Treatment plan change requested?  No    I have reviewed and marked as \"reviewed\" the current medications, allergies and problem list in the patients EMR.    I have reviewed the patient's medical, surgical  history in detail, reviewed any pertinent lab work  and updated the computerized patient record if needed.    Patient's Care Team:  Patient Care Team:  Minesh Leone MD as PCP - General (Family Medicine)  Padmaja Ye MD as Referring Physician (Colon and Rectal Surgery)  Beatrice Lau MD as Consulting Physician (Radiation Oncology)  Dong Nguyen MD PhD as Consulting Physician (Hematology and Oncology)  Wendy Cottrell MD as Consulting Physician (Obstetrics and Gynecology)  Roland Thorpe MD as Consulting Physician (Gastroenterology)        "

## 2019-08-15 PROCEDURE — 77386 CHG INTENSITY MODULATED RADIATION TX DLVR COMPLEX: CPT | Performed by: RADIOLOGY

## 2019-08-15 PROCEDURE — 77386: CPT | Performed by: RADIOLOGY

## 2019-08-15 PROCEDURE — 77014 CHG CT GUIDANCE RADIATION THERAPY FLDS PLACEMENT: CPT | Performed by: RADIOLOGY

## 2019-08-16 ENCOUNTER — INFUSION (OUTPATIENT)
Dept: ONCOLOGY | Facility: HOSPITAL | Age: 39
End: 2019-08-16

## 2019-08-16 DIAGNOSIS — C20 ADENOCARCINOMA OF RECTUM (HCC): Primary | ICD-10-CM

## 2019-08-16 DIAGNOSIS — Z45.2 ENCOUNTER FOR FITTING AND ADJUSTMENT OF VASCULAR CATHETER: ICD-10-CM

## 2019-08-16 PROCEDURE — 77014 CHG CT GUIDANCE RADIATION THERAPY FLDS PLACEMENT: CPT | Performed by: RADIOLOGY

## 2019-08-16 PROCEDURE — 77386 CHG INTENSITY MODULATED RADIATION TX DLVR COMPLEX: CPT | Performed by: RADIOLOGY

## 2019-08-16 PROCEDURE — 77386: CPT | Performed by: RADIOLOGY

## 2019-08-16 RX ORDER — SODIUM CHLORIDE 0.9 % (FLUSH) 0.9 %
10 SYRINGE (ML) INJECTION AS NEEDED
Status: CANCELLED | OUTPATIENT
Start: 2019-08-16

## 2019-08-16 RX ORDER — SODIUM CHLORIDE 0.9 % (FLUSH) 0.9 %
10 SYRINGE (ML) INJECTION AS NEEDED
Status: DISCONTINUED | OUTPATIENT
Start: 2019-08-16 | End: 2019-08-16 | Stop reason: HOSPADM

## 2019-08-16 RX ADMIN — SODIUM CHLORIDE, PRESERVATIVE FREE 10 ML: 5 INJECTION INTRAVENOUS at 15:29

## 2019-08-16 RX ADMIN — Medication 500 UNITS: at 15:29

## 2019-08-16 NOTE — PROGRESS NOTES
.     REASON FOR CONSULTATION:     Provide an opinion on any further workup or treatment of newly diagnosed rectal cancer.                              REQUESTING PHYSICIAN: Padmaja Ye MD     RECORDS OBTAINED:  Records of the patients history including those obtained from the referring provider were reviewed and summarized in detail.    HISTORY OF PRESENT ILLNESS:  The patient is a 39 y.o. year old female for newly diagnosed rectal cancer.    The patient reports she has intermittent rectal bleeding and urgency, mucous with her stool, starting sometime in 2016. At that time she was referred to GI service, and was diagnosed of irritable bowel syndrome. Nevertheless she had worsening urgency for bowel movement, and had a couple of incidents losing control of stool. She was recently seen by Roland Thorpe MD again and had colonoscopy and EGD exam on 07/08/2019. She was found having a circumferential rectal mass. According to the procedure note, the patient had a fungating circumferential bleeding 4 cm mass in the middle rectum, at distance between 13 cm and 17 cm from the anus. Mass was causing partial obstruction. There were also colon polyps found at the hepatic flexure and also at the rectosigmoid junction 23 cm from the anus. Both were resected and retrieved. EGD examination reported grade A esophagitis at the GE junction and patchy discontinuous erythema and aggravation of the mucosa without bleeding in the stomach body. There is normal mucosa of the duodenum. Pathology evaluation from this procedure reported moderately differentiated adenocarcinoma involving the rectal mass. The rectal sigmoid junction polyp was tubular/tubulovillous adenoma with high grade dysplasia negative for invasive malignancy. The hepatic flexure polyp was also tubular/tubulovillous adenoma negative for high grade dysplasia or malignancy. The biopsy from the esophagus reports squamocolumnar mucosa with inflammatory changes suggestive  of mild reflux esophagitis, negative for interstitial metaplasia. Gastric biopsy was benign and duodenal biopsy was also benign. There is no mention of H-pylori.     Patient was subsequently referred to colorectal surgeon Padmaja Ye MD for further evaluation. The patient had CT scan examination for chest, abdomen and pelvis requested by Dr. Ye and were done on 07/13/2019. The study reported no evidence of lymphadenopathy in the abdomen and pelvis. There was wall thickening of the rectosigmoid junction. Normal spleen, pancreas, adrenal glands and kidneys. There was fatty liver infiltration but no focal lymphatic lesions. There was a small 6-7 mm noncalcified nodule in the right upper lobe and a tiny 3 mm subpleural nodule in the right middle lobe. No mediastinal or hilar lymphadenopathy.     Dr. Ye performed staging on the rectal ultrasound examination. This study reported tumor penetrating through the muscularis propria as T3 disease; there were no lymph nodes identified.    The patient comes to the office today, August 19, 2019 for her second week of combined chemoradiation with infusional 5FU.  She will be due for her sixth of 28 planned fractions of radiation.  Overall, she tolerated her first week of treatment well.  She has begun to note some mildly loose stools over the last few days with a max of 6 times daily.  She has not yet tried Imodium for relief.  I have encouraged her to take up to 8 tablets daily.  Despite this, she is eating and drinking well.  She is experiencing some mild pelvic cramping, likely attributable to the radiation.  I have encouraged the use of Tylenol as she is to avoid NSAIDs while on anticoagulation.  She denies any evidence of hand-and-foot syndrome.        She continues on Lovenox 40 mg daily for positive heterozygous factor V Leiden.  She continues to have some slight tenderness at her injection sites, though this is manageable.  She denies any excess bleeding or  bruising.  No infectious symptoms including fevers or chills.  Her labs are all within normal limits.    She has no other concerns today.            Past Medical History:   Diagnosis Date   • Abnormal Pap smear of cervix 1998    JULIUS I   • Anxiety    • Cervical lymphadenitis 2012    SEEN AT [facility] ER   • Chronic diarrhea    • Colon cancer (CMS/HCC)     Stage III   ( STARTS RADIATION AND CHEMO ON 19 )   • Colon polyps    • Depression    • Factor V Leiden (CMS/HCC)     DX 2019   • GERD (gastroesophageal reflux disease)    • Hematochezia    • Hemorrhoids    • History of TB skin testing 2009    TB Skin Test   • HPV in female    • Infected insect bite of neck 2016    SEEN AT [facility]   • Irritable bowel syndrome    • Kidney stones 2007    SEEN AT [facility] ER   • Lumbar disc disease    • PIH (pregnancy induced hypertension)    • Rectal cancer (CMS/HCC) 2019    INVASIVE MODERATELY DIFFERENTIATED ADENOCARCINOMA   • Right leg pain    • SAB (spontaneous ) 1996     A2-1 INDUCED   • Sepsis due to cellulitis (CMS/HCC) 2002    LEFT GREAT TOE, ADMITTED TO [facility]     Past Surgical History:   Procedure Laterality Date   • CHOLECYSTECTOMY N/A 10/10/2003    LAPAROSCOPIC WITH CHOLANGIOGRAM, DR. JAMEY TALAVERA AT [facility]   • COLONOSCOPY W/ POLYPECTOMY N/A 2019    15 MM TUBULOVILLOUS ADENOMA POLYP IN HEPATIC FLEXURE, 20 MMTUBULOVILLOUS ADENOMA WITH HIGH GRADE DYSPLASIA IN RECTOSIGMOID, 4 CM MASS IN MID RECTUM, PATH: INVASIVE MODERATELY DIFFERENTIATED ADENOCARCINOMA, DR. JENNIFER LI AT [facility]   • DILATATION AND EVACUATION N/A    • ENDOSCOPY N/A 2019    LA GRADE A ESOPHAGITIS, GASTRITIS, ALL BIOPSIES BENIGN, DR. JENNIFER LI AT [facility]   • PAP SMEAR N/A 2014   • SIGMOIDOSCOPY N/A 2019    INFILTRATIVE PARTIALLY OBSTRUCTING LARGE RECTAL CANCER, AREA TATOOED, DR. GERONIMO ESPARZA AT [facility]   • TRANSRECTAL ULTRASOUND N/A 2019     Procedure: ULTRASOUND TRANSRECTAL;  Surgeon: Padmaja Ye MD;  Location: [facility] ENDOSCOPY;  Service: General   • VAGINAL DELIVERY N/A 09/18/1998   • VENOUS ACCESS DEVICE (PORT) INSERTION Left 8/9/2019    Procedure: INSERTION VENOUS ACCESS DEVICE;  Surgeon: Sj Joseph MD;  [facility];  Service: General   • WISDOM TOOTH EXTRACTION  1993       HEMATOLOGIC/ONCOLOGIC HISTORY:  (History from previous dates can be found in the separate document.)  See HPI.    MEDICATIONS    Current Outpatient Medications:   •  clonazePAM (KLONOPIN) 0.5 MG tablet, Take 1 tablet by mouth 2 (Two) Times a Day As Needed for Seizures., Disp: 60 tablet, Rfl: 5  •  dicyclomine (BENTYL) 20 MG tablet, Take 1 tablet by mouth Every 6 (Six) Hours. (Patient taking differently: Take 20 mg by mouth As Needed.), Disp: 120 tablet, Rfl: 6  •  enoxaparin (LOVENOX) 40 MG/0.4ML solution syringe, Inject 0.4 mL under the skin into the appropriate area as directed Every 12 (Twelve) Hours., Disp: 12 mL, Rfl: 11  •  escitalopram (LEXAPRO) 20 MG tablet, Take 1 tablet by mouth Daily., Disp: 90 tablet, Rfl: 3  •  HYDROcodone-acetaminophen (NORCO) 5-325 MG per tablet, 1-2 by mouth every four hours as needed for pain, Disp: 30 tablet, Rfl: 0  •  ondansetron (ZOFRAN) 4 MG tablet, Take 1 tablet by mouth Every 6 (Six) Hours As Needed for Nausea or Vomiting for up to 10 doses., Disp: 10 tablet, Rfl: 1  •  ondansetron (ZOFRAN) 8 MG tablet, Take 1 tablet by mouth 3 (Three) Times a Day As Needed for Nausea or Vomiting., Disp: 30 tablet, Rfl: 5    ALLERGIES:   No Known Allergies    SOCIAL HISTORY:       Social History     Socioeconomic History   • Marital status:      Spouse name: Justus   • Number of children: 1   • Years of education: Not on file   • Highest education level:  College   Occupational History     Employer: [employer]   Tobacco Use   • Smoking status: Current Every Day Smoker     Packs/day: 1.50     Years: 20.00     Pack years:  30.00     Types: Electronic Cigarette   • Smokeless tobacco: Never Used   Substance and Sexual Activity   • Alcohol use: Yes     Comment: Rarely   • Drug use: No   • Sexual activity: Defer         FAMILY HISTORY:     Mother has positive factor V Leiden mutation, although she did not have thrombosis, mother also is coronary disease with stenting, she also is occasional bruising.  Maternal grandmother had DVT, she had multiple surgical procedures.  Patient mother's half-brother had metastatic colon cancer at diagnosis in his 50s.  Maternal great aunt has breast cancer.  Patient will follow his skin cancer in his 60s, exclusive.    REVIEW OF SYSTEMS:  Review of Systems   Constitutional: Positive for unexpected weight change (12 pound weight loss being 6 to 12 months. weight has been reasonably stable recently ). Negative for appetite change, diaphoresis, fatigue and fever.   HENT: Negative for congestion, mouth sores and rhinorrhea.    Eyes: Negative for visual disturbance.   Respiratory: Negative for cough and shortness of breath.    Cardiovascular: Negative for chest pain, palpitations and leg swelling.   Gastrointestinal: Positive for anal bleeding (not reported today ) and blood in stool (stools regular today ). Negative for abdominal distention, abdominal pain, diarrhea and nausea.        See HPI    Endocrine: Positive for cold intolerance.   Genitourinary: Negative for dysuria and frequency.   Musculoskeletal: Negative for arthralgias, back pain and joint swelling.   Skin: Negative.    Allergic/Immunologic: Negative for immunocompromised state.   Neurological: Negative for dizziness, numbness and headaches.   Hematological: Negative for adenopathy. Bruises/bleeds easily (see HPI ).   Psychiatric/Behavioral: Negative for agitation and confusion.              There were no vitals filed for this visit.  Current Status 8/12/2019   ECOG score 0      PHYSICAL EXAM:      CONSTITUTIONAL:  Vital signs reviewed.   Well-developed, morbidly obese BMI 39.4, no distress, looks comfortable.  EYES:  Conjunctiva and lids unremarkable.  PERRLA  EARS,NOSE,MOUTH,THROAT:  Ears and nose appear unremarkable.  Lips, teeth, gums appear unremarkable.  RESPIRATORY:  Normal respiratory effort.  Lungs clear to auscultation bilaterally.  CARDIOVASCULAR: Normal rhythm and rate.  Normal S1, S2.  No murmurs rubs or gallops.  No significant lower extremity edema.  GASTROINTESTINAL: Obese, abdomen appears unremarkable.  Nontender.  No hepatomegaly.  No splenomegaly.  Normal bowel sounds. Slight bruising on right lower quadrant secondary to lovenox shots. This is consistent today, but not worsened   LYMPHATIC:  No cervical, supraclavicular, axillary or groin lymphadenopathy.  MUSCULOSKELETAL:  Unremarkable gait and station.  Unremarkable digits/nails.  No cyanosis or clubbing.  SKIN:  Warm.  No rashes.  PSYCHIATRIC:  Normal judgment and insight.  Normal mood and affect.  Exam unchanged from previous    RECENT LABS:        WBC   Date Value Ref Range Status   08/12/2019 7.56 3.40 - 10.80 10*3/mm3 Final   08/01/2019 9.07 3.40 - 10.80 10*3/mm3 Final     Hemoglobin   Date Value Ref Range Status   08/12/2019 14.1 12.0 - 15.9 g/dL Final   08/01/2019 14.8 12.0 - 15.9 g/dL Final     Platelets   Date Value Ref Range Status   08/12/2019 322 140 - 450 10*3/mm3 Final   08/01/2019 389 140 - 450 10*3/mm3 Final       IMAGE STUDY:   CT OF THE CHEST ABDOMEN AND PELVIS WITH CONTRAST 07/19/2019.     HISTORY: Rectal cancer. Staging.     Axial images were obtained from the lung apices to the symphysis pubis  after intravenous and oral contrast.     In the right upper lobe on image 29 is an approximately 6 mm to 7 mm  noncalcified nodule. A tiny 3 mm subpleural nodule is seen in the right  middle lobe on image 58. No other lung nodules are seen.     No significant pulmonary infiltrates are seen. No pathologically  enlarged hilar or mediastinal lymph nodes are seen.    "  There is fatty infiltration of the liver. No focal hepatic lesions are  seen. Gallbladder has been removed.     The spleen, pancreas, adrenals and kidneys appear within normal limits.     There is wall thickening of the rectosigmoid junction which could  represent patient's known carcinoma.     No lymphadenopathy is seen in the abdomen and pelvis.     IMPRESSION:  1. A 6 mm to 7 mm noncalcified nodule in the right upper lobe. This is  indeterminate. Another tiny subpleural nodule in the right middle lobe  is seen as well. Suggest correlation to any prior outside CT scans of  the chest. If none are available, short-term followup CT of the chest in  approximately 3 months suggested.  2. Wall thickening of the rectosigmoid junction as described.  3. Mild fatty infiltration of the liver.  4. No definite evidence of metastatic disease in the abdomen and pelvis.     Pathology:  Tissue Pathology Exam: [id number]   Order: [id number]   Collected:  7/24/2019 07:49 Status:  Final result   Visible to patient:  No (Not Released) Dx:  Rectal cancer (CMS/HCC)   Component    Final Diagnosis   1. Rectum, \"Mass,\" Biopsy:               A. INVASIVE MODERATELY DIFFERENTIATED ADENOCARCINOMA (see Comment).    Electronically signed by Jania Perez MD on 7/25/2019 at 1442   Comment    This case was shared in internal consultation with Dr. Michel who concurs.    Gross Description    1.  The specimen is received in formalin labeled with the patient's name and further designated 'rectal mass biopsy' are multiple small fragments of gray-tan tissue. The specimen is submitted for embedding as received.                 Assessment/Plan      Adenocarcinoma of rectum (CMS/HCC)  1.  The patient is a 38-year-old female who has newly diagnosed rectal cancer, rectal ultrasound examination reported T3N0 disease without lymphadenopathy. CT scan of chest, abdomen and pelvis reported no lymphadenopathy in the abdomen, pelvis or chest. She does have " small pulmonary nodule 6-7 mm and 2 mm with indeterminate feature. There is no solid evidence of metastatic disease otherwise. Based on the current imaging studies, this patient has stage IIA (T3N0M0) disease.  She will need PET scan examination for further evaluation prior to starting concurrent chemoradiation therapy.     The patient returns today, August 19, 2019 to proceed with her second week of neoadjuvant chemoradiation with infusional 5-FU.  She will subsequently undergo surgical resection and then proceed with 8 cycles of FOLFOX 6.    Overall, she tolerated the first week of treatment quite well with the exception of some slight loose stools.  We will proceed as scheduled today.  She will return in 1 week for consideration of week 3 of combined chemoradiation and in 2 weeks for cycle #4 with MD visit with Dr. Patrick martin.     2.  This patient has also strong family history for malignancy and I recommended to refer her for genetic counseling.  An appointment has been scheduled for September 3, 2019.    3.  Family history of positive factor V Leiden mutation with her mother, who never had thrombophilia.  This was checked prior to starting her on female hormonal supplementation.    The patient is heterozygous factor V Leiden and therefore will require low-dose anticoagulation with Lovenox, 40 mg daily given her increased risk of thrombosis with [employer] and GI malignancy.  She should continue self injecting daily.  She does note some discomfort at the injection site, though rotating sites regularly to manage this.    4. Loose, frequent stools: Patient reports that it is not necessarily diarrhea. Encouraged use of Imodium. Monitor    5. Pelvic pain: Radiation-related. Tylenol not to exceed 3000 mg daily.     PLAN:  1.  The patient will proceed with her second cycle of combined chemoradiation with infusional 5-FU today  2.  She will continue  daily Lovenox shots for factor V Leiden positivity  3.  The patient  does have an appointment with our genetic counselors on September 3, 2019 for her strong family history of malignancy  4. Imodium for diarrhea  5. Tylenol for pelvic pain  6.  The patient return in 1 week for his third dose of infusional 5-FU with radiation.  She will have a MD visit with Dr. Nguyen prior  7.  I have asked the patient to call our office with any new issues or concerns prior to her next office visit.  She has verbalized understanding.    Patient is on drug therapy requiring extensive monitoring.

## 2019-08-18 PROCEDURE — 77336 RADIATION PHYSICS CONSULT: CPT | Performed by: RADIOLOGY

## 2019-08-19 ENCOUNTER — INFUSION (OUTPATIENT)
Dept: ONCOLOGY | Facility: HOSPITAL | Age: 39
End: 2019-08-19

## 2019-08-19 ENCOUNTER — OFFICE VISIT (OUTPATIENT)
Dept: ONCOLOGY | Facility: CLINIC | Age: 39
End: 2019-08-19

## 2019-08-19 VITALS
HEART RATE: 89 BPM | TEMPERATURE: 98 F | WEIGHT: 236.3 LBS | HEIGHT: 66 IN | SYSTOLIC BLOOD PRESSURE: 123 MMHG | RESPIRATION RATE: 12 BRPM | DIASTOLIC BLOOD PRESSURE: 84 MMHG | OXYGEN SATURATION: 97 % | BODY MASS INDEX: 37.97 KG/M2

## 2019-08-19 DIAGNOSIS — C20 ADENOCARCINOMA OF RECTUM (HCC): ICD-10-CM

## 2019-08-19 DIAGNOSIS — D68.51 HETEROZYGOUS FACTOR V LEIDEN MUTATION (HCC): ICD-10-CM

## 2019-08-19 DIAGNOSIS — C20 ADENOCARCINOMA OF RECTUM (HCC): Primary | ICD-10-CM

## 2019-08-19 LAB
ALBUMIN SERPL-MCNC: 3.6 G/DL (ref 3.5–5.2)
ALBUMIN/GLOB SERPL: 1.2 G/DL (ref 1.1–2.4)
ALP SERPL-CCNC: 68 U/L (ref 38–116)
ALT SERPL W P-5'-P-CCNC: 32 U/L (ref 0–33)
ANION GAP SERPL CALCULATED.3IONS-SCNC: 12 MMOL/L (ref 5–15)
AST SERPL-CCNC: 19 U/L (ref 0–32)
BASOPHILS # BLD AUTO: 0.01 10*3/MM3 (ref 0–0.2)
BASOPHILS NFR BLD AUTO: 0.2 % (ref 0–1.5)
BILIRUB SERPL-MCNC: 0.4 MG/DL (ref 0.2–1.2)
BUN BLD-MCNC: 8 MG/DL (ref 6–20)
BUN/CREAT SERPL: 10.5 (ref 7.3–30)
CALCIUM SPEC-SCNC: 8.7 MG/DL (ref 8.5–10.2)
CHLORIDE SERPL-SCNC: 102 MMOL/L (ref 98–107)
CO2 SERPL-SCNC: 25 MMOL/L (ref 22–29)
CREAT BLD-MCNC: 0.76 MG/DL (ref 0.6–1.1)
DEPRECATED RDW RBC AUTO: 44.3 FL (ref 37–54)
EOSINOPHIL # BLD AUTO: 0.17 10*3/MM3 (ref 0–0.4)
EOSINOPHIL NFR BLD AUTO: 2.7 % (ref 0.3–6.2)
ERYTHROCYTE [DISTWIDTH] IN BLOOD BY AUTOMATED COUNT: 13.3 % (ref 12.3–15.4)
GFR SERPL CREATININE-BSD FRML MDRD: 84 ML/MIN/1.73
GLOBULIN UR ELPH-MCNC: 3.1 GM/DL (ref 1.8–3.5)
GLUCOSE BLD-MCNC: 120 MG/DL (ref 74–124)
HCT VFR BLD AUTO: 44.7 % (ref 34–46.6)
HGB BLD-MCNC: 14.4 G/DL (ref 12–15.9)
IMM GRANULOCYTES # BLD AUTO: 0.03 10*3/MM3 (ref 0–0.05)
IMM GRANULOCYTES NFR BLD AUTO: 0.5 % (ref 0–0.5)
LYMPHOCYTES # BLD AUTO: 1.35 10*3/MM3 (ref 0.7–3.1)
LYMPHOCYTES NFR BLD AUTO: 21.6 % (ref 19.6–45.3)
MCH RBC QN AUTO: 29.6 PG (ref 26.6–33)
MCHC RBC AUTO-ENTMCNC: 32.2 G/DL (ref 31.5–35.7)
MCV RBC AUTO: 92 FL (ref 79–97)
MONOCYTES # BLD AUTO: 0.37 10*3/MM3 (ref 0.1–0.9)
MONOCYTES NFR BLD AUTO: 5.9 % (ref 5–12)
NEUTROPHILS # BLD AUTO: 4.31 10*3/MM3 (ref 1.7–7)
NEUTROPHILS NFR BLD AUTO: 69.1 % (ref 42.7–76)
NRBC BLD AUTO-RTO: 0 /100 WBC (ref 0–0.2)
PLATELET # BLD AUTO: 304 10*3/MM3 (ref 140–450)
PMV BLD AUTO: 9 FL (ref 6–12)
POTASSIUM BLD-SCNC: 4.1 MMOL/L (ref 3.5–4.7)
PROT SERPL-MCNC: 6.7 G/DL (ref 6.3–8)
RBC # BLD AUTO: 4.86 10*6/MM3 (ref 3.77–5.28)
SODIUM BLD-SCNC: 139 MMOL/L (ref 134–145)
WBC NRBC COR # BLD: 6.24 10*3/MM3 (ref 3.4–10.8)

## 2019-08-19 PROCEDURE — 77386: CPT | Performed by: RADIOLOGY

## 2019-08-19 PROCEDURE — 77014 CHG CT GUIDANCE RADIATION THERAPY FLDS PLACEMENT: CPT | Performed by: RADIOLOGY

## 2019-08-19 PROCEDURE — 96416 CHEMO PROLONG INFUSE W/PUMP: CPT | Performed by: NURSE PRACTITIONER

## 2019-08-19 PROCEDURE — 85025 COMPLETE CBC W/AUTO DIFF WBC: CPT

## 2019-08-19 PROCEDURE — 25010000002 FLUOROURACIL PER 500 MG: Performed by: NURSE PRACTITIONER

## 2019-08-19 PROCEDURE — 80053 COMPREHEN METABOLIC PANEL: CPT

## 2019-08-19 PROCEDURE — 99213 OFFICE O/P EST LOW 20 MIN: CPT | Performed by: NURSE PRACTITIONER

## 2019-08-19 PROCEDURE — 77386 CHG INTENSITY MODULATED RADIATION TX DLVR COMPLEX: CPT | Performed by: RADIOLOGY

## 2019-08-19 PROCEDURE — 77427 RADIATION TX MANAGEMENT X5: CPT | Performed by: RADIOLOGY

## 2019-08-19 RX ADMIN — FLUOROURACIL 2420 MG: 50 INJECTION, SOLUTION INTRAVENOUS at 10:02

## 2019-08-19 NOTE — PROGRESS NOTES
CIVI 5 FU ball hooked up.  Positive blood return present.  Tubing secured to patient abdomen and all clamps, unclamped - verified with Almaz Baker RN .   Pt will return Friday 8/23 at 3:30 p.m. For unhook.

## 2019-08-20 PROCEDURE — 77386: CPT | Performed by: RADIOLOGY

## 2019-08-20 PROCEDURE — 77014 CHG CT GUIDANCE RADIATION THERAPY FLDS PLACEMENT: CPT | Performed by: RADIOLOGY

## 2019-08-20 PROCEDURE — 77386 CHG INTENSITY MODULATED RADIATION TX DLVR COMPLEX: CPT | Performed by: RADIOLOGY

## 2019-08-21 ENCOUNTER — HOSPITAL ENCOUNTER (OUTPATIENT)
Dept: CT IMAGING | Facility: HOSPITAL | Age: 39
Discharge: HOME | End: 2019-08-21
Admitting: RADIOLOGY

## 2019-08-21 DIAGNOSIS — C20 ADENOCARCINOMA OF RECTUM (HCC): ICD-10-CM

## 2019-08-21 PROCEDURE — 70491 CT SOFT TISSUE NECK W/DYE: CPT

## 2019-08-21 PROCEDURE — 25010000002 IOPAMIDOL 61 % SOLUTION: Performed by: RADIOLOGY

## 2019-08-21 PROCEDURE — 70470 CT HEAD/BRAIN W/O & W/DYE: CPT

## 2019-08-21 PROCEDURE — 77014 CHG CT GUIDANCE RADIATION THERAPY FLDS PLACEMENT: CPT | Performed by: RADIOLOGY

## 2019-08-21 PROCEDURE — 77386: CPT | Performed by: RADIOLOGY

## 2019-08-21 PROCEDURE — 77386 CHG INTENSITY MODULATED RADIATION TX DLVR COMPLEX: CPT | Performed by: RADIOLOGY

## 2019-08-21 RX ADMIN — IOPAMIDOL 85 ML: 612 INJECTION, SOLUTION INTRAVENOUS at 07:56

## 2019-08-22 PROCEDURE — 77386 CHG INTENSITY MODULATED RADIATION TX DLVR COMPLEX: CPT | Performed by: RADIOLOGY

## 2019-08-22 PROCEDURE — 77014 CHG CT GUIDANCE RADIATION THERAPY FLDS PLACEMENT: CPT | Performed by: RADIOLOGY

## 2019-08-22 PROCEDURE — 77386: CPT | Performed by: RADIOLOGY

## 2019-08-23 ENCOUNTER — RADIATION ONCOLOGY WEEKLY ASSESSMENT (OUTPATIENT)
Dept: RADIATION ONCOLOGY | Facility: HOSPITAL | Age: 39
End: 2019-08-23

## 2019-08-23 ENCOUNTER — INFUSION (OUTPATIENT)
Dept: ONCOLOGY | Facility: HOSPITAL | Age: 39
End: 2019-08-23

## 2019-08-23 VITALS — HEART RATE: 87 BPM | BODY MASS INDEX: 37.95 KG/M2 | OXYGEN SATURATION: 99 % | WEIGHT: 235 LBS

## 2019-08-23 DIAGNOSIS — C20 ADENOCARCINOMA OF RECTUM (HCC): ICD-10-CM

## 2019-08-23 DIAGNOSIS — Z45.2 ENCOUNTER FOR FITTING AND ADJUSTMENT OF VASCULAR CATHETER: Primary | ICD-10-CM

## 2019-08-23 DIAGNOSIS — C20 ADENOCARCINOMA OF RECTUM (HCC): Primary | ICD-10-CM

## 2019-08-23 PROCEDURE — 77386: CPT | Performed by: RADIOLOGY

## 2019-08-23 PROCEDURE — 77386 CHG INTENSITY MODULATED RADIATION TX DLVR COMPLEX: CPT | Performed by: RADIOLOGY

## 2019-08-23 PROCEDURE — 77014 CHG CT GUIDANCE RADIATION THERAPY FLDS PLACEMENT: CPT | Performed by: RADIOLOGY

## 2019-08-23 RX ORDER — HYOSCYAMINE SULFATE 0.125 MG
0.12 TABLET ORAL EVERY 6 HOURS PRN
Qty: 30 TABLET | Refills: 2 | Status: SHIPPED | OUTPATIENT
Start: 2019-08-23 | End: 2019-12-07 | Stop reason: HOSPADM

## 2019-08-23 RX ORDER — SODIUM CHLORIDE 0.9 % (FLUSH) 0.9 %
10 SYRINGE (ML) INJECTION AS NEEDED
Status: CANCELLED | OUTPATIENT
Start: 2019-08-23

## 2019-08-23 RX ORDER — SODIUM CHLORIDE 0.9 % (FLUSH) 0.9 %
10 SYRINGE (ML) INJECTION AS NEEDED
Status: DISCONTINUED | OUTPATIENT
Start: 2019-08-23 | End: 2019-08-23 | Stop reason: HOSPADM

## 2019-08-23 RX ADMIN — SODIUM CHLORIDE, PRESERVATIVE FREE 10 ML: 5 INJECTION INTRAVENOUS at 15:40

## 2019-08-23 RX ADMIN — Medication 500 UNITS: at 15:40

## 2019-08-23 NOTE — PROGRESS NOTES
.     REASON FOR CONSULTATION:     Provide an opinion on any further workup or treatment of newly diagnosed rectal cancer.                              REQUESTING PHYSICIAN: Padmaja Ye MD     RECORDS OBTAINED:  Records of the patients history including those obtained from the referring provider were reviewed and summarized in detail.    HISTORY OF PRESENT ILLNESS:  The patient is a 39 y.o. year old female for newly diagnosed rectal cancer.    The patient reports she has intermittent rectal bleeding and urgency, mucous with her stool, starting sometime in 2016. At that time she was referred to GI service, and was diagnosed of irritable bowel syndrome. Nevertheless she had worsening urgency for bowel movement, and had a couple of incidents losing control of stool. She was recently seen by Roland Thorpe MD again and had colonoscopy and EGD exam on 07/08/2019. She was found having a circumferential rectal mass. According to the procedure note, the patient had a fungating circumferential bleeding 4 cm mass in the middle rectum, at distance between 13 cm and 17 cm from the anus. Mass was causing partial obstruction. There were also colon polyps found at the hepatic flexure and also at the rectosigmoid junction 23 cm from the anus. Both were resected and retrieved. EGD examination reported grade A esophagitis at the GE junction and patchy discontinuous erythema and aggravation of the mucosa without bleeding in the stomach body. There is normal mucosa of the duodenum. Pathology evaluation from this procedure reported moderately differentiated adenocarcinoma involving the rectal mass. The rectal sigmoid junction polyp was tubular/tubulovillous adenoma with high grade dysplasia negative for invasive malignancy. The hepatic flexure polyp was also tubular/tubulovillous adenoma negative for high grade dysplasia or malignancy. The biopsy from the esophagus reports squamocolumnar mucosa with inflammatory changes suggestive  of mild reflux esophagitis, negative for interstitial metaplasia. Gastric biopsy was benign and duodenal biopsy was also benign. There is no mention of H-pylori.     Patient was subsequently referred to colorectal surgeon Padmaja Ye MD for further evaluation. The patient had CT scan examination for chest, abdomen and pelvis requested by Dr. Ye and were done on 07/13/2019. The study reported no evidence of lymphadenopathy in the abdomen and pelvis. There was wall thickening of the rectosigmoid junction. Normal spleen, pancreas, adrenal glands and kidneys. There was fatty liver infiltration but no focal lymphatic lesions. There was a small 6-7 mm noncalcified nodule in the right upper lobe and a tiny 3 mm subpleural nodule in the right middle lobe. No mediastinal or hilar lymphadenopathy.     Dr. Ye performed staging on the rectal ultrasound examination. This study reported tumor penetrating through the muscularis propria as T3 disease; there were no lymph nodes identified.    The patient comes to the office today, August 26, 2019 for her third week of combined chemoradiation with infusional 5FU.  She will be due for her 11th of 28 planned fractions of radiation.  She is tolerating treatment reasonably well, thus far.  She has continued to struggle with mild diarrhea and pelvic cramping related to radiation therapy. She has been using Imodium for control of diarrhea. She was given a prescription for Hyoscyamine per radiation and this has helped significantly with reducing pelvic cramping.     She is eating and drinking well.  She denies any evidence of hand-and-foot syndrome.     She continues on Lovenox 40 mg daily for positive heterozygous factor V Leiden.  At this point, she only has a minimal bruising on her abdomen though no additional bleeding.     She denies any infectious symptoms including fevers or chills.  Her labs are all within normal limits today.    She has no other concerns.            Past  Medical History:   Diagnosis Date   • Abnormal Pap smear of cervix 1998    JULIUS I   • Anxiety    • Cervical lymphadenitis 2012    SEEN AT [facility] ER   • Chronic diarrhea    • Colon cancer (CMS/HCC)     Stage III   ( STARTS RADIATION AND CHEMO ON 19 )   • Colon polyps    • Depression    • Factor V Leiden (CMS/HCC)     DX 2019   • GERD (gastroesophageal reflux disease)    • Hematochezia    • Hemorrhoids    • History of TB skin testing 2009    TB Skin Test   • HPV in female    • Infected insect bite of neck 2016    SEEN AT [facility]   • Irritable bowel syndrome    • Kidney stones 2007    SEEN AT [facility] ER   • Lumbar disc disease    • PIH (pregnancy induced hypertension)    • Rectal cancer (CMS/HCC) 2019    INVASIVE MODERATELY DIFFERENTIATED ADENOCARCINOMA   • Right leg pain    • SAB (spontaneous ) 1996     A2-1 INDUCED   • Sepsis due to cellulitis (CMS/HCC) 2002    LEFT GREAT TOE, ADMITTED TO [facility]     Past Surgical History:   Procedure Laterality Date   • CHOLECYSTECTOMY N/A 10/10/2003    LAPAROSCOPIC WITH CHOLANGIOGRAM, DR. JAMEY TALAVERA AT [facility]   • COLONOSCOPY W/ POLYPECTOMY N/A 2019    15 MM TUBULOVILLOUS ADENOMA POLYP IN HEPATIC FLEXURE, 20 MMTUBULOVILLOUS ADENOMA WITH HIGH GRADE DYSPLASIA IN RECTOSIGMOID, 4 CM MASS IN MID RECTUM, PATH: INVASIVE MODERATELY DIFFERENTIATED ADENOCARCINOMA, DR. JENNIFER LI AT [facility]   • DILATATION AND EVACUATION N/A    • ENDOSCOPY N/A 2019    LA GRADE A ESOPHAGITIS, GASTRITIS, ALL BIOPSIES BENIGN, DR. JENNIFER LI AT [facility]   • PAP SMEAR N/A 2014   • SIGMOIDOSCOPY N/A 2019    INFILTRATIVE PARTIALLY OBSTRUCTING LARGE RECTAL CANCER, AREA TATOOED, DR. GERONIMO ESPARZA AT [facility]   • TRANSRECTAL ULTRASOUND N/A 2019    Procedure: ULTRASOUND TRANSRECTAL;  Surgeon: Geronimo Esparza MD;  Location: [facility] ENDOSCOPY;  Service: General   • VAGINAL DELIVERY N/A 1998    • VENOUS ACCESS DEVICE (PORT) INSERTION Left 8/9/2019    Procedure: INSERTION VENOUS ACCESS DEVICE;  Surgeon: Sj Joseph MD;  [place] [facility] [place] [facility] [place]  Service: General   • WISDOM TOOTH EXTRACTION  1993       HEMATOLOGIC/ONCOLOGIC HISTORY:  (History from previous dates can be found in the separate document.)  See HPI.    MEDICATIONS    Current Outpatient Medications:   •  clonazePAM (KLONOPIN) 0.5 MG tablet, Take 1 tablet by mouth 2 (Two) Times a Day As Needed for Seizures., Disp: 60 tablet, Rfl: 5  •  dicyclomine (BENTYL) 20 MG tablet, Take 1 tablet by mouth Every 6 (Six) Hours. (Patient taking differently: Take 20 mg by mouth As Needed.), Disp: 120 tablet, Rfl: 6  •  enoxaparin (LOVENOX) 40 MG/0.4ML solution syringe, Inject 0.4 mL under the skin into the appropriate area as directed Every 12 (Twelve) Hours., Disp: 12 mL, Rfl: 11  •  escitalopram (LEXAPRO) 20 MG tablet, Take 1 tablet by mouth Daily., Disp: 90 tablet, Rfl: 3  •  HYDROcodone-acetaminophen (NORCO) 5-325 MG per tablet, 1-2 by mouth every four hours as needed for pain, Disp: 30 tablet, Rfl: 0  •  ondansetron (ZOFRAN) 4 MG tablet, Take 1 tablet by mouth Every 6 (Six) Hours As Needed for Nausea or Vomiting for up to 10 doses., Disp: 10 tablet, Rfl: 1  •  ondansetron (ZOFRAN) 8 MG tablet, Take 1 tablet by mouth 3 (Three) Times a Day As Needed for Nausea or Vomiting., Disp: 30 tablet, Rfl: 5    ALLERGIES:   No Known Allergies    SOCIAL HISTORY:       Social History     Socioeconomic History   • Marital status:      Spouse name: Justus   • Number of children: 1   • Years of education: Not on file   • Highest education level:  College   Occupational History     Employer: [employer]   Tobacco Use   • Smoking status: Current Every Day Smoker     Packs/day: 1.50     Years: 20.00     Pack years: 30.00     Types: Electronic Cigarette   • Smokeless tobacco: Never Used   Substance and Sexual Activity   • Alcohol use: Yes     Comment: Rarely   •  Drug use: No   • Sexual activity: Defer         FAMILY HISTORY:     Mother has positive factor V Leiden mutation, although she did not have thrombosis, mother also is coronary disease with stenting, she also is occasional bruising.  Maternal grandmother had DVT, she had multiple surgical procedures.  Patient mother's half-brother had metastatic colon cancer at diagnosis in his 50s.  Maternal great aunt has breast cancer.  Patient will follow his skin cancer in his 60s, exclusive.    REVIEW OF SYSTEMS:  Review of Systems   Constitutional: Positive for unexpected weight change (12 pound weight loss being 6 to 12 months. weight has been reasonably stable recently ). Negative for appetite change, diaphoresis, fatigue and fever.   HENT: Negative for congestion, mouth sores and rhinorrhea.    Eyes: Negative for visual disturbance.   Respiratory: Negative for cough and shortness of breath.    Cardiovascular: Negative for chest pain, palpitations and leg swelling.   Gastrointestinal: Positive for blood in stool (stools regular today ). Negative for abdominal distention, abdominal pain, anal bleeding (not reported today ), diarrhea and nausea.        See HPI    Endocrine: Negative for cold intolerance.   Genitourinary: Negative for dysuria and frequency.   Musculoskeletal: Negative for arthralgias, back pain and joint swelling.   Skin: Negative.    Allergic/Immunologic: Negative for immunocompromised state.   Neurological: Negative for dizziness, numbness and headaches.   Hematological: Negative for adenopathy. Bruises/bleeds easily (see HPI ).   Psychiatric/Behavioral: Negative for agitation and confusion.               There were no vitals filed for this visit.  Current Status 8/19/2019   ECOG score 0      PHYSICAL EXAM:      CONSTITUTIONAL:  Vital signs reviewed.  Well-developed, morbidly obese BMI 39.4, no distress, looks comfortable.  EYES:  Conjunctiva and lids unremarkable.  PERRLA  EARS,NOSE,MOUTH,THROAT:  Ears and  nose appear unremarkable.  Lips, teeth, gums appear unremarkable.  RESPIRATORY:  Normal respiratory effort.  Lungs clear to auscultation bilaterally.  CARDIOVASCULAR: Normal rhythm and rate.  Normal S1, S2.  No murmurs rubs or gallops.  No significant lower extremity edema.  GASTROINTESTINAL: Obese, abdomen appears unremarkable.  Nontender.  No hepatomegaly.  No splenomegaly.  Normal bowel sounds. Slight bruising on right lower quadrant secondary to lovenox shots. This is consistent today, but not worsened   LYMPHATIC:  No cervical, supraclavicular, axillary or groin lymphadenopathy.  MUSCULOSKELETAL:  Unremarkable gait and station.  Unremarkable digits/nails.  No cyanosis or clubbing.  SKIN:  Warm.  No rashes.  PSYCHIATRIC:  Normal judgment and insight.  Normal mood and affect.  Exam unchanged from previous    RECENT LABS:        WBC   Date Value Ref Range Status   08/19/2019 6.24 3.40 - 10.80 10*3/mm3 Final   08/12/2019 7.56 3.40 - 10.80 10*3/mm3 Final   08/01/2019 9.07 3.40 - 10.80 10*3/mm3 Final     Hemoglobin   Date Value Ref Range Status   08/19/2019 14.4 12.0 - 15.9 g/dL Final   08/12/2019 14.1 12.0 - 15.9 g/dL Final   08/01/2019 14.8 12.0 - 15.9 g/dL Final     Platelets   Date Value Ref Range Status   08/19/2019 304 140 - 450 10*3/mm3 Final   08/12/2019 322 140 - 450 10*3/mm3 Final   08/01/2019 389 140 - 450 10*3/mm3 Final       IMAGE STUDY:   CT OF THE CHEST ABDOMEN AND PELVIS WITH CONTRAST 07/19/2019.     HISTORY: Rectal cancer. Staging.     Axial images were obtained from the lung apices to the symphysis pubis  after intravenous and oral contrast.     In the right upper lobe on image 29 is an approximately 6 mm to 7 mm  noncalcified nodule. A tiny 3 mm subpleural nodule is seen in the right  middle lobe on image 58. No other lung nodules are seen.     No significant pulmonary infiltrates are seen. No pathologically  enlarged hilar or mediastinal lymph nodes are seen.     There is fatty infiltration of the  "liver. No focal hepatic lesions are  seen. Gallbladder has been removed.     The spleen, pancreas, adrenals and kidneys appear within normal limits.     There is wall thickening of the rectosigmoid junction which could  represent patient's known carcinoma.     No lymphadenopathy is seen in the abdomen and pelvis.     IMPRESSION:  1. A 6 mm to 7 mm noncalcified nodule in the right upper lobe. This is  indeterminate. Another tiny subpleural nodule in the right middle lobe  is seen as well. Suggest correlation to any prior outside CT scans of  the chest. If none are available, short-term followup CT of the chest in  approximately 3 months suggested.  2. Wall thickening of the rectosigmoid junction as described.  3. Mild fatty infiltration of the liver.  4. No definite evidence of metastatic disease in the abdomen and pelvis.     Pathology:  Tissue Pathology Exam: [id number]   Order: [id number]   Collected:  7/24/2019 07:49 Status:  Final result   Visible to patient:  No (Not Released) Dx:  Rectal cancer (CMS/HCC)   Component    Final Diagnosis   1. Rectum, \"Mass,\" Biopsy:               A. INVASIVE MODERATELY DIFFERENTIATED ADENOCARCINOMA (see Comment).    Electronically signed by Jania Perez MD on 7/25/2019 at 1442   Comment    This case was shared in internal consultation with Dr. Michel who concurs.    Gross Description    1.  The specimen is received in formalin labeled with the patient's name and further designated 'rectal mass biopsy' are multiple small fragments of gray-tan tissue. The specimen is submitted for embedding as received.                 Assessment/Plan      Adenocarcinoma of rectum (CMS/HCC)  1.  The patient is a 38-year-old female who has newly diagnosed rectal cancer, rectal ultrasound examination reported T3N0 disease without lymphadenopathy. CT scan of chest, abdomen and pelvis reported no lymphadenopathy in the abdomen, pelvis or chest. She does have small pulmonary nodule 6-7 mm and 2 mm " with indeterminate feature. There is no solid evidence of metastatic disease otherwise. Based on the current imaging studies, this patient has stage IIA (T3N0M0) disease.  She will need PET scan examination for further evaluation prior to starting concurrent chemoradiation therapy.     The patient returns today, August 26, 2019 to proceed with her third week of neoadjuvant chemoradiation with infusional 5-FU.  She will subsequently undergo surgical resection and then proceed with 8 cycles of FOLFOX 6.    She is tolerating treatment well, overall.  Loose stools have been controlled with Imodium. Pelvic cramping has been helpful with antispasmodics.  She continues on anticoagulation with Lovenox daily without complication.  She will return in 1 week for consideration of week 4 of combined chemoradiation with  MD visit with Dr. Patrick martin.     2.  This patient has also strong family history for malignancy and I recommended to refer her for genetic counseling.  An appointment has been scheduled for September 3, 2019.    3.  Family history of positive factor V Leiden mutation with her mother, who never had thrombophilia.  This was checked prior to starting her on female hormonal supplementation.    The patient is heterozygous factor V Leiden and therefore will require low-dose anticoagulation with Lovenox, 40 mg daily given her increased risk of thrombosis with [employer] and GI malignancy.  She has minimal bruising and is rotating the sites of injection.  She will continue daily.    4. Loose, frequent stools: Patient reports that it is not necessarily diarrhea.  She continues using Imodium for relief.  We will continue to monitor this closely.    5. Pelvic pain: Radiation-related. Tylenol not to exceed 3000 mg daily. Hyoscyamine for relief.  Stable at this point.  We will continue to monitor.    PLAN:  1.  The patient will proceed with her third cycle of combined chemoradiation with infusional 5-FU today  2.  She will  continue  daily Lovenox shots for factor V Leiden positivity  3.  The patient does have an appointment with our genetic counselors on September 3, 2019 for her strong family history of malignancy  4.  Continue Imodium for diarrhea  5.  Continue Tylenol for pelvic pain  6. Hyoscyamine for relief   7.  The patient return in 1 week for her fourth dose of infusional 5-FU with radiation.  She will have a MD visit with Dr. Patrick martin  8.  I have asked the patient to call our office with any new issues or concerns prior to her next office visit.  She has verbalized understanding.    Patient is on drug therapy requiring extensive monitoring.

## 2019-08-23 NOTE — PROGRESS NOTES
AFTER DEACCESSING PT'S PORT NOTICED THAT PT HAS DEVELOPED A POSS SEROMA. WAS ABLE TO GENTLY PUSH AROUND SITE AND GOT THIN, WATERY, PALE RED FLUID OUT ON GAUZE. DRAINAGE DID STOP AND APPLIED GAUZE DRESSING TO SITE. PT AWARE TO MONITOR FOR REDNESS, STREAKING A/O WARMTH TO SITE AND WILL CALL IF EXP.

## 2019-08-23 NOTE — PROGRESS NOTES
Physician Weekly Management Note    Diagnosis:   Adenocarcinoma of rectum (CMS/HCC) Cancer Staging  Stage IIA (cT3, cN0, cM0)    Reason for Visit: Radiation (10/28)    Concurrent Chemo:   Yes    Notes on Treatment course, Films, Medical progress and Plan:  Had CT brain/neck - all fine. Reviewed with her. Had spells of cramping and frequency of stool, 1 episode of diarrhea - took 2 immodium and no BM today. Will send in hyoscyamine for cramping. Talked thru other remedies - heating pad, walking, pressure on abdomen. Rec start soaking in tub, some alteration in diet. Cont on.    Performance Status:  (1) Restricted in physically strenuous activity, ambulatory and able to do work of light nature    ROS - Other than as listed above, as follows:  Constitutional - Normal - no complaints of fatigue, denies lack of appetite, fever, night sweats or change in weight.  Cardiovascular - Normal - denies arrhythmias, chest pain, dyspnea, edema, orthopnea or palpitations.  Respiratory - Normal - denies cough, dyspnea, hemoptysis, hiccoughs, pleuritic chest pain or wheezing.  Gastrointestinal - Normal - no complaints of constipation, abdominal pain, diarrhea, heartburn/dyspepsia, hematemesis, hemorrhoids, melena or GI bleeding, nausea, pain or cramping or vomiting.    PHYSICAL EXAM - Other than as listed above, as follows:  Vitals:    08/23/19 1614   Pulse: 87   SpO2: 99%   Weight: 107 kg (235 lb)   PainSc: 0-No pain         Constitutional - Normal - no evidence of impaired alertness, inadequate appearance, premature or advanced chronologic age, uncooperativeness, altered mood, affect or disorientation.    Problem added:  No problems updated.  Medications added: No orders of the defined types were placed in this encounter.    Ancillary referrals made:   No orders of the defined types were placed in this encounter.      Technical aspects reviewed:  Weekly OBI approved if applicable? Yes  Weekly port films approved?   Yes  Change  "requests noted if applicable?  Yes  Patient setup and plan reviewed?  Yes    Chart Reviewed:  Continue current treatment plan?  Yes  Treatment plan change requested?  No    I have reviewed and marked as \"reviewed\" the current medications, allergies and problem list in the patients EMR.    I have reviewed the patient's medical, surgical  history in detail, reviewed any pertinent lab work  and updated the computerized patient record if needed.    Patient's Care Team:  Patient Care Team:  Minesh Leone MD as PCP - General (Family Medicine)  Padmaja Ye MD as Referring Physician (Colon and Rectal Surgery)  Beatrice Lau MD as Consulting Physician (Radiation Oncology)  Dong Nguyen MD PhD as Consulting Physician (Hematology and Oncology)  Wendy Cottrell MD as Consulting Physician (Obstetrics and Gynecology)  Roland Thorpe MD as Consulting Physician (Gastroenterology)        "

## 2019-08-26 ENCOUNTER — INFUSION (OUTPATIENT)
Dept: ONCOLOGY | Facility: HOSPITAL | Age: 39
End: 2019-08-26

## 2019-08-26 ENCOUNTER — OFFICE VISIT (OUTPATIENT)
Dept: ONCOLOGY | Facility: CLINIC | Age: 39
End: 2019-08-26

## 2019-08-26 VITALS
RESPIRATION RATE: 16 BRPM | BODY MASS INDEX: 39.1 KG/M2 | HEIGHT: 65 IN | HEART RATE: 78 BPM | OXYGEN SATURATION: 95 % | WEIGHT: 234.7 LBS | DIASTOLIC BLOOD PRESSURE: 79 MMHG | SYSTOLIC BLOOD PRESSURE: 118 MMHG | TEMPERATURE: 98.3 F

## 2019-08-26 DIAGNOSIS — C20 ADENOCARCINOMA OF RECTUM (HCC): Primary | ICD-10-CM

## 2019-08-26 DIAGNOSIS — C20 ADENOCARCINOMA OF RECTUM (HCC): ICD-10-CM

## 2019-08-26 LAB
ALBUMIN SERPL-MCNC: 3.5 G/DL (ref 3.5–5.2)
ALBUMIN/GLOB SERPL: 1.2 G/DL (ref 1.1–2.4)
ALP SERPL-CCNC: 63 U/L (ref 38–116)
ALT SERPL W P-5'-P-CCNC: 21 U/L (ref 0–33)
ANION GAP SERPL CALCULATED.3IONS-SCNC: 11.3 MMOL/L (ref 5–15)
AST SERPL-CCNC: 14 U/L (ref 0–32)
BASOPHILS # BLD AUTO: 0.01 10*3/MM3 (ref 0–0.2)
BASOPHILS NFR BLD AUTO: 0.2 % (ref 0–1.5)
BILIRUB SERPL-MCNC: 0.2 MG/DL (ref 0.2–1.2)
BUN BLD-MCNC: 8 MG/DL (ref 6–20)
BUN/CREAT SERPL: 10.1 (ref 7.3–30)
CALCIUM SPEC-SCNC: 8.7 MG/DL (ref 8.5–10.2)
CHLORIDE SERPL-SCNC: 103 MMOL/L (ref 98–107)
CO2 SERPL-SCNC: 25.7 MMOL/L (ref 22–29)
CREAT BLD-MCNC: 0.79 MG/DL (ref 0.6–1.1)
DEPRECATED RDW RBC AUTO: 43 FL (ref 37–54)
EOSINOPHIL # BLD AUTO: 0.27 10*3/MM3 (ref 0–0.4)
EOSINOPHIL NFR BLD AUTO: 5.2 % (ref 0.3–6.2)
ERYTHROCYTE [DISTWIDTH] IN BLOOD BY AUTOMATED COUNT: 13.7 % (ref 12.3–15.4)
GFR SERPL CREATININE-BSD FRML MDRD: 81 ML/MIN/1.73
GLOBULIN UR ELPH-MCNC: 2.9 GM/DL (ref 1.8–3.5)
GLUCOSE BLD-MCNC: 102 MG/DL (ref 74–124)
HCT VFR BLD AUTO: 42.3 % (ref 34–46.6)
HGB BLD-MCNC: 13.9 G/DL (ref 12–15.9)
IMM GRANULOCYTES # BLD AUTO: 0.01 10*3/MM3 (ref 0–0.05)
IMM GRANULOCYTES NFR BLD AUTO: 0.2 % (ref 0–0.5)
LYMPHOCYTES # BLD AUTO: 0.92 10*3/MM3 (ref 0.7–3.1)
LYMPHOCYTES NFR BLD AUTO: 17.6 % (ref 19.6–45.3)
MCH RBC QN AUTO: 29.5 PG (ref 26.6–33)
MCHC RBC AUTO-ENTMCNC: 32.9 G/DL (ref 31.5–35.7)
MCV RBC AUTO: 89.8 FL (ref 79–97)
MONOCYTES # BLD AUTO: 0.39 10*3/MM3 (ref 0.1–0.9)
MONOCYTES NFR BLD AUTO: 7.5 % (ref 5–12)
NEUTROPHILS # BLD AUTO: 3.62 10*3/MM3 (ref 1.7–7)
NEUTROPHILS NFR BLD AUTO: 69.3 % (ref 42.7–76)
NRBC BLD AUTO-RTO: 0 /100 WBC (ref 0–0.2)
PLATELET # BLD AUTO: 226 10*3/MM3 (ref 140–450)
PMV BLD AUTO: 8.8 FL (ref 6–12)
POTASSIUM BLD-SCNC: 3.9 MMOL/L (ref 3.5–4.7)
PROT SERPL-MCNC: 6.4 G/DL (ref 6.3–8)
RBC # BLD AUTO: 4.71 10*6/MM3 (ref 3.77–5.28)
SODIUM BLD-SCNC: 140 MMOL/L (ref 134–145)
WBC NRBC COR # BLD: 5.22 10*3/MM3 (ref 3.4–10.8)

## 2019-08-26 PROCEDURE — 77386 CHG INTENSITY MODULATED RADIATION TX DLVR COMPLEX: CPT | Performed by: RADIOLOGY

## 2019-08-26 PROCEDURE — 96416 CHEMO PROLONG INFUSE W/PUMP: CPT | Performed by: NURSE PRACTITIONER

## 2019-08-26 PROCEDURE — 85025 COMPLETE CBC W/AUTO DIFF WBC: CPT

## 2019-08-26 PROCEDURE — 80053 COMPREHEN METABOLIC PANEL: CPT

## 2019-08-26 PROCEDURE — 25010000002 FLUOROURACIL PER 500 MG: Performed by: NURSE PRACTITIONER

## 2019-08-26 PROCEDURE — 77014 CHG CT GUIDANCE RADIATION THERAPY FLDS PLACEMENT: CPT | Performed by: RADIOLOGY

## 2019-08-26 PROCEDURE — 77336 RADIATION PHYSICS CONSULT: CPT | Performed by: RADIOLOGY

## 2019-08-26 PROCEDURE — 77427 RADIATION TX MANAGEMENT X5: CPT | Performed by: RADIOLOGY

## 2019-08-26 PROCEDURE — 99213 OFFICE O/P EST LOW 20 MIN: CPT | Performed by: NURSE PRACTITIONER

## 2019-08-26 PROCEDURE — 77386: CPT | Performed by: RADIOLOGY

## 2019-08-26 RX ADMIN — FLUOROURACIL 2420 MG: 50 INJECTION, SOLUTION INTRAVENOUS at 09:18

## 2019-08-27 PROCEDURE — 77014 CHG CT GUIDANCE RADIATION THERAPY FLDS PLACEMENT: CPT | Performed by: RADIOLOGY

## 2019-08-28 PROCEDURE — 77014 CHG CT GUIDANCE RADIATION THERAPY FLDS PLACEMENT: CPT | Performed by: RADIOLOGY

## 2019-08-28 PROCEDURE — 77386 CHG INTENSITY MODULATED RADIATION TX DLVR COMPLEX: CPT | Performed by: RADIOLOGY

## 2019-08-28 PROCEDURE — 77386: CPT | Performed by: RADIOLOGY

## 2019-08-29 ENCOUNTER — RADIATION ONCOLOGY WEEKLY ASSESSMENT (OUTPATIENT)
Dept: RADIATION ONCOLOGY | Facility: HOSPITAL | Age: 39
End: 2019-08-29

## 2019-08-29 VITALS
WEIGHT: 231.2 LBS | DIASTOLIC BLOOD PRESSURE: 97 MMHG | HEART RATE: 97 BPM | BODY MASS INDEX: 38.06 KG/M2 | OXYGEN SATURATION: 97 % | SYSTOLIC BLOOD PRESSURE: 142 MMHG

## 2019-08-29 DIAGNOSIS — C20 ADENOCARCINOMA OF RECTUM (HCC): Primary | ICD-10-CM

## 2019-08-29 PROCEDURE — 77014 CHG CT GUIDANCE RADIATION THERAPY FLDS PLACEMENT: CPT | Performed by: RADIOLOGY

## 2019-08-29 PROCEDURE — 77386 CHG INTENSITY MODULATED RADIATION TX DLVR COMPLEX: CPT | Performed by: RADIOLOGY

## 2019-08-29 PROCEDURE — 77386: CPT | Performed by: RADIOLOGY

## 2019-08-29 NOTE — PROGRESS NOTES
Physician Weekly Management Note    Diagnosis:   Adenocarcinoma of rectum (CMS/[facility]) Cancer Staging  Stage IIA (cT3, cN0, cM0)    Reason for Visit: Radiation (14/28)    Concurrent Chemo:   Yes    Notes on Treatment course, Films, Medical progress and Plan:  After lengthy discussion last visit, she decided not to eat at all to avoid the diarrhea/pain with passing stool. She has had nothing but 3 cherry sodas a day since Monday. She states continued diarrhea, up to 8 times a day. She has not taken the immodium nor the antispasmodic meds. She has soaked in the tub once.  She continues to be concerned about working full time and effect of bowels on ability to work. She states her  and mother are very angry at her about this and she did not allow anyone to come with her today.    Lengthy discussion about her decisions in her care. Discussed need for protein and calories, recovery from treatment. Also discussed effects of fasting on the bowel/kameron. She is still very hesitant to eat. Wants to try a liquid diet only. Will give her some Ensure/Boost and have Anay meet with her to discuss the diet further, especially if she insists on a liquid diet.     Significant pain with passing stool discussed. Offered pain meds but is only when passing stool. Will send in some proctofoam to try. Also has some nocturia without dysuria. Suggested pyridium as needed. Encouraged her to soak in the tub as well. Discussed some vaginal dryness remedies as well.    Told her if she does not start eating, I will stop her treatments and she understood. Will continue to weigh her frequently and check in on status.     Performance Status:  (1) Restricted in physically strenuous activity, ambulatory and able to do work of light nature    ROS - Other than as listed above, as follows:  Constitutional - Normal - no complaints of fatigue, denies lack of appetite, fever, night sweats or change in weight.  Cardiovascular - Normal - denies  "arrhythmias, chest pain, dyspnea, edema, orthopnea or palpitations.  Respiratory - Normal - denies cough, dyspnea, hemoptysis, hiccoughs, pleuritic chest pain or wheezing.  Gastrointestinal - Normal - no complaints of constipation, abdominal pain, diarrhea, heartburn/dyspepsia, hematemesis, hemorrhoids, melena or GI bleeding, nausea, pain or cramping or vomiting.    PHYSICAL EXAM - Other than as listed above, as follows:  There were no vitals filed for this visit.      Constitutional - Normal - no evidence of impaired alertness, inadequate appearance, premature or advanced chronologic age, uncooperativeness, altered mood, affect or disorientation.    Problem added:  No problems updated.  Medications added: No orders of the defined types were placed in this encounter.    Ancillary referrals made:   No orders of the defined types were placed in this encounter.      Technical aspects reviewed:  Weekly OBI approved if applicable? Yes  Weekly port films approved?   Yes  Change requests noted if applicable?  Yes  Patient setup and plan reviewed?  Yes    Chart Reviewed:  Continue current treatment plan?  Yes  Treatment plan change requested?  No    I have reviewed and marked as \"reviewed\" the current medications, allergies and problem list in the patients EMR.    I have reviewed the patient's medical, surgical  history in detail, reviewed any pertinent lab work  and updated the computerized patient record if needed.    Patient's Care Team:  Patient Care Team:  Minesh Leone MD as PCP - General (Family Medicine)  Padmaja Ye MD as Referring Physician (Colon and Rectal Surgery)  Beatrice Lau MD as Consulting Physician (Radiation Oncology)  Dong Nguyen MD PhD as Consulting Physician (Hematology and Oncology)  Wendy Cottrell MD as Consulting Physician (Obstetrics and Gynecology)  Roland Thorpe MD as Consulting Physician (Gastroenterology)        "

## 2019-08-30 ENCOUNTER — INFUSION (OUTPATIENT)
Dept: ONCOLOGY | Facility: HOSPITAL | Age: 39
End: 2019-08-30

## 2019-08-30 DIAGNOSIS — Z45.2 ENCOUNTER FOR FITTING AND ADJUSTMENT OF VASCULAR CATHETER: ICD-10-CM

## 2019-08-30 DIAGNOSIS — C20 ADENOCARCINOMA OF RECTUM (HCC): Primary | ICD-10-CM

## 2019-08-30 PROCEDURE — 77014 CHG CT GUIDANCE RADIATION THERAPY FLDS PLACEMENT: CPT | Performed by: RADIOLOGY

## 2019-08-30 PROCEDURE — 77386 CHG INTENSITY MODULATED RADIATION TX DLVR COMPLEX: CPT | Performed by: RADIOLOGY

## 2019-08-30 PROCEDURE — 77386: CPT | Performed by: RADIOLOGY

## 2019-08-30 RX ORDER — SODIUM CHLORIDE 0.9 % (FLUSH) 0.9 %
10 SYRINGE (ML) INJECTION AS NEEDED
Status: CANCELLED | OUTPATIENT
Start: 2019-08-30

## 2019-08-30 RX ORDER — SODIUM CHLORIDE 0.9 % (FLUSH) 0.9 %
10 SYRINGE (ML) INJECTION AS NEEDED
Status: DISCONTINUED | OUTPATIENT
Start: 2019-08-30 | End: 2019-08-30 | Stop reason: HOSPADM

## 2019-08-30 RX ADMIN — SODIUM CHLORIDE, PRESERVATIVE FREE 500 UNITS: 5 INJECTION INTRAVENOUS at 15:29

## 2019-08-30 RX ADMIN — Medication 10 ML: at 15:29

## 2019-09-01 ENCOUNTER — APPOINTMENT (OUTPATIENT)
Dept: RADIATION ONCOLOGY | Facility: HOSPITAL | Age: 39
End: 2019-09-01

## 2019-09-02 PROCEDURE — 77336 RADIATION PHYSICS CONSULT: CPT | Performed by: RADIOLOGY

## 2019-09-03 ENCOUNTER — LAB (OUTPATIENT)
Dept: LAB | Facility: HOSPITAL | Age: 39
End: 2019-09-03

## 2019-09-03 ENCOUNTER — OFFICE VISIT (OUTPATIENT)
Dept: ONCOLOGY | Facility: CLINIC | Age: 39
End: 2019-09-03

## 2019-09-03 ENCOUNTER — CLINICAL SUPPORT (OUTPATIENT)
Dept: OTHER | Facility: HOSPITAL | Age: 39
End: 2019-09-03

## 2019-09-03 ENCOUNTER — INFUSION (OUTPATIENT)
Dept: ONCOLOGY | Facility: HOSPITAL | Age: 39
End: 2019-09-03

## 2019-09-03 VITALS
RESPIRATION RATE: 16 BRPM | HEIGHT: 65 IN | WEIGHT: 225 LBS | DIASTOLIC BLOOD PRESSURE: 86 MMHG | HEART RATE: 100 BPM | OXYGEN SATURATION: 94 % | TEMPERATURE: 97.6 F | SYSTOLIC BLOOD PRESSURE: 130 MMHG | BODY MASS INDEX: 37.49 KG/M2

## 2019-09-03 DIAGNOSIS — C20 ADENOCARCINOMA OF RECTUM (HCC): Primary | ICD-10-CM

## 2019-09-03 DIAGNOSIS — E87.6 HYPOKALEMIA: ICD-10-CM

## 2019-09-03 DIAGNOSIS — D68.51 HETEROZYGOUS FACTOR V LEIDEN MUTATION (HCC): ICD-10-CM

## 2019-09-03 DIAGNOSIS — C20 ADENOCARCINOMA OF RECTUM (HCC): ICD-10-CM

## 2019-09-03 DIAGNOSIS — K59.1 FUNCTIONAL DIARRHEA: ICD-10-CM

## 2019-09-03 DIAGNOSIS — T45.1X5A ADVERSE EFFECT OF ANTINEOPLASTIC AND IMMUNOSUPPRESSIVE DRUGS, INITIAL ENCOUNTER: ICD-10-CM

## 2019-09-03 PROBLEM — E83.42 HYPOMAGNESEMIA: Status: ACTIVE | Noted: 2019-09-03

## 2019-09-03 LAB
ALBUMIN SERPL-MCNC: 3.9 G/DL (ref 3.5–5.2)
ALBUMIN/GLOB SERPL: 1.3 G/DL (ref 1.1–2.4)
ALP SERPL-CCNC: 71 U/L (ref 38–116)
ALT SERPL W P-5'-P-CCNC: 28 U/L (ref 0–33)
ANION GAP SERPL CALCULATED.3IONS-SCNC: 16.2 MMOL/L (ref 5–15)
AST SERPL-CCNC: 16 U/L (ref 0–32)
BASOPHILS # BLD AUTO: 0.03 10*3/MM3 (ref 0–0.2)
BASOPHILS NFR BLD AUTO: 0.5 % (ref 0–1.5)
BILIRUB SERPL-MCNC: 0.3 MG/DL (ref 0.2–1.2)
BUN BLD-MCNC: 7 MG/DL (ref 6–20)
BUN/CREAT SERPL: 7.4 (ref 7.3–30)
CALCIUM SPEC-SCNC: 9.1 MG/DL (ref 8.5–10.2)
CHLORIDE SERPL-SCNC: 99 MMOL/L (ref 98–107)
CO2 SERPL-SCNC: 22.8 MMOL/L (ref 22–29)
CREAT BLD-MCNC: 0.95 MG/DL (ref 0.6–1.1)
DEPRECATED RDW RBC AUTO: 41.2 FL (ref 37–54)
EOSINOPHIL # BLD AUTO: 0.38 10*3/MM3 (ref 0–0.4)
EOSINOPHIL NFR BLD AUTO: 6 % (ref 0.3–6.2)
ERYTHROCYTE [DISTWIDTH] IN BLOOD BY AUTOMATED COUNT: 14.3 % (ref 12.3–15.4)
GFR SERPL CREATININE-BSD FRML MDRD: 65 ML/MIN/1.73
GLOBULIN UR ELPH-MCNC: 3 GM/DL (ref 1.8–3.5)
GLUCOSE BLD-MCNC: 114 MG/DL (ref 74–124)
HCT VFR BLD AUTO: 45.1 % (ref 34–46.6)
HGB BLD-MCNC: 15.4 G/DL (ref 12–15.9)
IMM GRANULOCYTES # BLD AUTO: 0.04 10*3/MM3 (ref 0–0.05)
IMM GRANULOCYTES NFR BLD AUTO: 0.6 % (ref 0–0.5)
LYMPHOCYTES # BLD AUTO: 0.84 10*3/MM3 (ref 0.7–3.1)
LYMPHOCYTES NFR BLD AUTO: 13.2 % (ref 19.6–45.3)
MAGNESIUM SERPL-MCNC: 1.7 MG/DL (ref 1.8–2.5)
MCH RBC QN AUTO: 29.9 PG (ref 26.6–33)
MCHC RBC AUTO-ENTMCNC: 34.1 G/DL (ref 31.5–35.7)
MCV RBC AUTO: 87.6 FL (ref 79–97)
MONOCYTES # BLD AUTO: 0.46 10*3/MM3 (ref 0.1–0.9)
MONOCYTES NFR BLD AUTO: 7.3 % (ref 5–12)
NEUTROPHILS # BLD AUTO: 4.59 10*3/MM3 (ref 1.7–7)
NEUTROPHILS NFR BLD AUTO: 72.4 % (ref 42.7–76)
NRBC BLD AUTO-RTO: 0.3 /100 WBC (ref 0–0.2)
PLATELET # BLD AUTO: 222 10*3/MM3 (ref 140–450)
PMV BLD AUTO: 8.7 FL (ref 6–12)
POTASSIUM BLD-SCNC: 3.3 MMOL/L (ref 3.5–4.7)
PROT SERPL-MCNC: 6.9 G/DL (ref 6.3–8)
RBC # BLD AUTO: 5.15 10*6/MM3 (ref 3.77–5.28)
SODIUM BLD-SCNC: 138 MMOL/L (ref 134–145)
WBC NRBC COR # BLD: 6.34 10*3/MM3 (ref 3.4–10.8)

## 2019-09-03 PROCEDURE — 77386 CHG INTENSITY MODULATED RADIATION TX DLVR COMPLEX: CPT | Performed by: RADIOLOGY

## 2019-09-03 PROCEDURE — 99215 OFFICE O/P EST HI 40 MIN: CPT | Performed by: INTERNAL MEDICINE

## 2019-09-03 PROCEDURE — 77014 CHG CT GUIDANCE RADIATION THERAPY FLDS PLACEMENT: CPT | Performed by: RADIOLOGY

## 2019-09-03 PROCEDURE — 96416 CHEMO PROLONG INFUSE W/PUMP: CPT | Performed by: INTERNAL MEDICINE

## 2019-09-03 PROCEDURE — 83735 ASSAY OF MAGNESIUM: CPT | Performed by: INTERNAL MEDICINE

## 2019-09-03 PROCEDURE — 80053 COMPREHEN METABOLIC PANEL: CPT | Performed by: INTERNAL MEDICINE

## 2019-09-03 PROCEDURE — 77386: CPT | Performed by: RADIOLOGY

## 2019-09-03 PROCEDURE — 85025 COMPLETE CBC W/AUTO DIFF WBC: CPT | Performed by: INTERNAL MEDICINE

## 2019-09-03 PROCEDURE — G0463 HOSPITAL OUTPT CLINIC VISIT: HCPCS

## 2019-09-03 PROCEDURE — 25010000002 FLUOROURACIL PER 500 MG: Performed by: INTERNAL MEDICINE

## 2019-09-03 PROCEDURE — 36415 COLL VENOUS BLD VENIPUNCTURE: CPT | Performed by: INTERNAL MEDICINE

## 2019-09-03 RX ORDER — POTASSIUM CHLORIDE 20 MEQ/1
20 TABLET, EXTENDED RELEASE ORAL DAILY
Qty: 30 TABLET | Refills: 3 | Status: SHIPPED | OUTPATIENT
Start: 2019-09-03 | End: 2019-10-07

## 2019-09-03 RX ADMIN — FLUOROURACIL 1940 MG: 50 INJECTION, SOLUTION INTRAVENOUS at 11:00

## 2019-09-03 NOTE — PROGRESS NOTES
REASON FOR FOLLOW UP:     1. Newly diagnosed rectal cancer, rectal ultrasound examination reported T3N0 disease without lymphadenopathy. She does have small pulmonary nodule 6-7 mm and 2 mm with indeterminate feature. There is no solid evidence of metastatic disease otherwise. Patient has stage IIA (T3N0M0) disease.  2. The patient is heterozygous factor V Leiden, currently on prophylactic anticoagulation with Lovenox 40 mg daily given her increased risk of thrombosis with MediPort and GI malignancy.   3.  The 8 mm hypermetabolic right upper lobe pulmonary nodule is suspicious for metastatic as well.    4.  Patient had a PowerPort placement on the left upper chest by Dr. Joseph on 8/9/2019.  5.  Patient was started on concurrent infusional 5-FU chemoradiation therapy on 8/12/2019, with planned complete surgical resection and further adjuvant chemotherapy with intention to cure the disease.     HISTORY OF PRESENT ILLNESS:  The patient is a 39 y.o. year old female who returns today for one week follow up in anticipation of Cycle #4 day-1 of infusional 5-FU. Patient is accompanied by her mother today who helped with history.    Patient was started on concurrent chemoradiotherapy, with infusion of 5-FU on 8/12/2019.     Patient reports she has significant diarrhea. Patient states that she currently utilizes imodium to manage diarrhea which she experiences approximately 9-10 times a day. She states this extremely painful as she has been experiencing external hemmorroids. She has only been using immodium up to 3 times a day, due to concern of overcorrection of her diarrhea and then become constipated.     Since started radiation therapy, she notes her vulva area feels 'raw' and due to recently prescribed Levsin for abdominal pain management, she is unsure if she has had any blood in her urine or bowel movements.     The patient has lost 6 lbs within the past week and states her appetite has decreased.  She reports  no nausea no vomiting.  She denies any evidence of hand-and-foot syndrome.     She continues on Lovenox 40 mg daily for positive heterozygous factor V Leiden. She denies any infectious symptoms including fevers or chills.  Her labs are all within normal limits today.    Laboratory study performed today 09/03/2019 reported normal CBC with Hb 15.4 MCV 87.6, platelets 222,000 and WBC 6340 including ANC 4600.    Recent PET imaging on 08/08/2019 reported that the 5 cm annular rectal mass is markedly intensely hypermetabolic. There is likely direct extension to the posterior wall of the uterus. The hypermetabolic pelvic nodes are likely metastatic. The 8 mm hypermetabolic right upper lobe pulmonary nodule is likely metastatic as well. The mildly hypermetabolic neck nodes are indeterminate. The more than typical intense speckled activity throughout the bones is of uncertain etiology. No definite lesions are seen on the corresponding CT sequence. The appearance can be seen with bone marrow stimulants. The hypermetabolic foci do not correspond to the multiple sclerotic lesions within the pelvic bones. 1 of the largest measures approximately 1.8 cm at the medial roof of the left acetabulum. This sclerotic lesion and other sclerotic foci were not present on a previous pelvic CT from 08/09/2007, but there are a few tiny sclerotic foci which were present previously and are either stable or slightly larger. Other than the pelvic bones, there are otherwise no sclerotic foci within the bones.Hypermetabolic activity along the marginally thickened distal esophagus is nonspecific.    Recent CT Head and Neck on 08/21/2019 reported no evidence of abnormal enhancement to suggest metastatic disease in the head and no evidence of a dominant cervical mass or obvious pathologic adenopathy in the neck, however a 10 mm node is appreciated involving the jugulodigastric region on the left and there is a 7 mm nodule involving the right upper lobe  anteriorly, nonspecific. Metastatic disease cannot be excluded.    Recent Bilateral Screening Mammogram on 2019 was benign.        Past Medical History:   Diagnosis Date   • Abnormal Pap smear of cervix 1998    JULIUS I   • Anxiety    • Cervical lymphadenitis 2012    SEEN AT [facility] ER   • Chronic diarrhea    • Colon cancer (CMS/HCC)     Stage III   ( STARTS RADIATION AND CHEMO ON 19 )   • Colon polyps    • Depression    • Factor V Leiden (CMS/HCC)     DX 2019   • GERD (gastroesophageal reflux disease)    • Hematochezia    • Hemorrhoids    • History of TB skin testing 2009    TB Skin Test   • HPV in female    • Infected insect bite of neck 2016    SEEN AT [facility]   • Irritable bowel syndrome    • Kidney stones 2007    SEEN AT [facility] ER   • Lumbar disc disease    • PIH (pregnancy induced hypertension)    • Rectal cancer (CMS/HCC) 2019    INVASIVE MODERATELY DIFFERENTIATED ADENOCARCINOMA   • Right leg pain    • SAB (spontaneous ) 1996     A2-1 INDUCED   • Sepsis due to cellulitis (CMS/HCC) 2002    LEFT GREAT TOE, ADMITTED TO [facility]     Past Surgical History:   Procedure Laterality Date   • CHOLECYSTECTOMY N/A 10/10/2003    LAPAROSCOPIC WITH CHOLANGIOGRAM, DR. JAMEY TALAVERA AT [facility]   • COLONOSCOPY W/ POLYPECTOMY N/A 2019    15 MM TUBULOVILLOUS ADENOMA POLYP IN HEPATIC FLEXURE, 20 MMTUBULOVILLOUS ADENOMA WITH HIGH GRADE DYSPLASIA IN RECTOSIGMOID, 4 CM MASS IN MID RECTUM, PATH: INVASIVE MODERATELY DIFFERENTIATED ADENOCARCINOMA, DR. JENNIFER LI AT [facility]   • DILATATION AND EVACUATION N/A    • ENDOSCOPY N/A 2019    LA GRADE A ESOPHAGITIS, GASTRITIS, ALL BIOPSIES BENIGN, DR. JENNIFER LI AT [facility]   • PAP SMEAR N/A 2014   • SIGMOIDOSCOPY N/A 2019    INFILTRATIVE PARTIALLY OBSTRUCTING LARGE RECTAL CANCER, AREA TATOOED, DR. GERONIMO ESPARZA AT [facility]   • TRANSRECTAL ULTRASOUND N/A 2019     Procedure: ULTRASOUND TRANSRECTAL;  Surgeon: Padmaja Ye MD;  Location: [facility] ENDOSCOPY;  Service: General   • VAGINAL DELIVERY N/A 09/18/1998   • VENOUS ACCESS DEVICE (PORT) INSERTION Left 8/9/2019    Procedure: INSERTION VENOUS ACCESS DEVICE;  Surgeon: Sj Joseph MD;  Location: [facility] OR [facility];  Service: General   • WISDOM TOOTH EXTRACTION  1993       HEMATOLOGIC/ONCOLOGIC HISTORY:      The patient reports she has intermittent rectal bleeding and urgency, mucous with her stool, starting sometime in 2016. At that time she was referred to GI service, and was diagnosed of irritable bowel syndrome. Nevertheless she had worsening urgency for bowel movement, and had a couple of incidents losing control of stool. She was recently seen by Roland Thorpe MD again and had colonoscopy and EGD exam on 07/08/2019. She was found having a circumferential rectal mass. According to the procedure note, the patient had a fungating circumferential bleeding 4 cm mass in the middle rectum, at distance between 13 cm and 17 cm from the anus. Mass was causing partial obstruction. There were also colon polyps found at the hepatic flexure and also at the rectosigmoid junction 23 cm from the anus. Both were resected and retrieved. EGD examination reported grade A esophagitis at the GE junction and patchy discontinuous erythema and aggravation of the mucosa without bleeding in the stomach body. There is normal mucosa of the duodenum. Pathology evaluation from this procedure reported moderately differentiated adenocarcinoma involving the rectal mass. The rectal sigmoid junction polyp was tubular/tubulovillous adenoma with high grade dysplasia negative for invasive malignancy. The hepatic flexure polyp was also tubular/tubulovillous adenoma negative for high grade dysplasia or malignancy. The biopsy from the esophagus reports squamocolumnar mucosa with inflammatory changes suggestive of mild reflux esophagitis, negative for  interstitial metaplasia. Gastric biopsy was benign and duodenal biopsy was also benign. There is no mention of H-pylori.     Patient was subsequently referred to colorectal surgeon Padmaja eY MD for further evaluation. The patient had CT scan examination for chest, abdomen and pelvis requested by Dr. Ye and were done on 07/13/2019. The study reported no evidence of lymphadenopathy in the abdomen and pelvis. There was wall thickening of the rectosigmoid junction. Normal spleen, pancreas, adrenal glands and kidneys. There was fatty liver infiltration but no focal lymphatic lesions. There was a small 6-7 mm noncalcified nodule in the right upper lobe and a tiny 3 mm subpleural nodule in the right middle lobe. No mediastinal or hilar lymphadenopathy.     Dr. Ye performed staging rectal ultrasound examination. This study reported tumor penetrating through the muscularis propria as T3 disease; there were no lymph nodes identified.      MEDICATIONS    Current Outpatient Medications:   •  clonazePAM (KLONOPIN) 0.5 MG tablet, Take 1 tablet by mouth 2 (Two) Times a Day As Needed for Seizures., Disp: 60 tablet, Rfl: 5  •  dicyclomine (BENTYL) 20 MG tablet, Take 1 tablet by mouth Every 6 (Six) Hours. (Patient taking differently: Take 20 mg by mouth As Needed.), Disp: 120 tablet, Rfl: 6  •  enoxaparin (LOVENOX) 40 MG/0.4ML solution syringe, Inject 0.4 mL under the skin into the appropriate area as directed Every 12 (Twelve) Hours., Disp: 12 mL, Rfl: 11  •  escitalopram (LEXAPRO) 20 MG tablet, Take 1 tablet by mouth Daily., Disp: 90 tablet, Rfl: 3  •  HYDROcodone-acetaminophen (NORCO) 5-325 MG per tablet, 1-2 by mouth every four hours as needed for pain, Disp: 30 tablet, Rfl: 0  •  hyoscyamine (ANASPAZ,LEVSIN) 0.125 MG tablet, Take 1 tablet by mouth Every 6 (Six) Hours As Needed for Cramping., Disp: 30 tablet, Rfl: 2  •  ondansetron (ZOFRAN) 8 MG tablet, Take 1 tablet by mouth 3 (Three) Times a Day As Needed for Nausea  or Vomiting., Disp: 30 tablet, Rfl: 5  •  ondansetron (ZOFRAN) 4 MG tablet, Take 1 tablet by mouth Every 6 (Six) Hours As Needed for Nausea or Vomiting for up to 10 doses., Disp: 10 tablet, Rfl: 1  •  potassium chloride (K-DUR,KLOR-CON) 20 MEQ CR tablet, Take 1 tablet by mouth Daily., Disp: 30 tablet, Rfl: 3  •  pramoxine (PROCTOFOAM) 1 % foam, Insert  into the rectum Every 2 (Two) Hours As Needed for Hemorrhoids., Disp: 15 g, Rfl: 2  No current facility-administered medications for this visit.     Facility-Administered Medications Ordered in Other Visits:   •  fluorouracil (ADRUCIL) 1,940 mg, sodium chloride 0.9 % 163.2 mL chemo infusion - FOR HOME USE, 900 mg/m2 (Treatment Plan Recorded), Intravenous, Once, Dong Nguyen MD PhD, 1,940 mg at 09/03/19 1100    ALLERGIES:   No Known Allergies    SOCIAL HISTORY:       Social History     Socioeconomic History   • Marital status:      Spouse name: Justus   • Number of children: 1   • Years of education: Not on file   • Highest education level:  College   Occupational History     Employer: [employer]   Tobacco Use   • Smoking status: Current Every Day Smoker     Packs/day: 1.50     Years: 20.00     Pack years: 30.00     Types: Electronic Cigarette   • Smokeless tobacco: Never Used   Substance and Sexual Activity   • Alcohol use: Yes     Comment: Rarely   • Drug use: No   • Sexual activity: Defer         FAMILY HISTORY:   Mother has positive factor V Leiden mutation, although she did not have thrombosis, mother also is coronary disease with stenting, she also is occasional bruising.  Maternal grandmother had DVT, she had multiple surgical procedures.  Patient mother's half-brother had metastatic colon cancer at diagnosis in his 50s.  Maternal great aunt has breast cancer.  Patient will follow his skin cancer in his 60s, exclusive.    REVIEW OF SYSTEMS:  Review of Systems   Constitutional: Positive for unexpected weight change (12 pound weight loss being 6 to  "12 months. weight has been reasonably stable recently 09/03/19-Weight loss). Negative for appetite change, chills, diaphoresis, fatigue and fever.   HENT: Negative for congestion, hearing loss, mouth sores, rhinorrhea, sore throat and trouble swallowing.    Eyes: Negative for visual disturbance.   Respiratory: Negative for cough, chest tightness, shortness of breath and wheezing.    Cardiovascular: Negative for chest pain, palpitations and leg swelling.   Gastrointestinal: Positive for diarrhea (08/03/19-Worse) and nausea (09/03/19). Negative for abdominal distention, abdominal pain, anal bleeding (not reported today ), constipation and vomiting.        See HPI    Endocrine: Negative for cold intolerance.   Genitourinary: Negative for dysuria, frequency, hematuria and urgency.   Musculoskeletal: Negative for arthralgias, back pain and joint swelling.        No muscle weakness.   Skin: Negative.  Negative for rash and wound.   Allergic/Immunologic: Negative for immunocompromised state.   Neurological: Negative for dizziness, seizures, syncope, speech difficulty, weakness, numbness and headaches.   Hematological: Negative for adenopathy. Bruises/bleeds easily (see HPI 09/03/19).   Psychiatric/Behavioral: Negative for agitation, behavioral problems, confusion and suicidal ideas.          Vitals:    09/03/19 0756   BP: 130/86   Pulse: 100   Resp: 16   Temp: 97.6 °F (36.4 °C)   TempSrc: Oral   SpO2: 94%   Weight: 102 kg (225 lb)   Height: 166 cm (65.35\")   PainSc:   2   PainLoc: Rectum     Current Status 9/3/2019   ECOG score 0      PHYSICAL EXAM:    GENERAL:  Well-developed, well-nourished  female in no acute distress.   SKIN:  Warm, dry without rashes, purpura or petechiae.  EYES:  Pupils equal, round and reactive to light.  EOMs intact.  Conjunctivae normal.  EARS:  Hearing intact.  NOSE:  No nasal discharge.  MOUTH:  Tongue is well-papillated; no stomatitis or ulcers.  Lips normal.  THROAT:  Oropharynx " without lesions or exudates.  NECK:  Supple with good range of motion; no thyromegaly or masses.  LYMPHATICS:  No cervical, supraclavicular, axillary or inguinal adenopathy.  CHEST:  Lungs clear to auscultation. Good airflow.  CARDIAC:  Regular rate and rhythm without murmurs, rubs or gallops. Normal S1,S2.  ABDOMEN:  Soft, nontender with no hepatosplenomegaly or masses. Bowel sounds normal.  EXTREMITIES:  No clubbing, cyanosis or edema.  NEUROLOGICAL:  Cranial Nerves II-XII grossly intact.  No focal neurological deficits.  PSYCHIATRIC:  Normal affect and mood.        RECENT LABS:  Lab Results   Component Value Date    WBC 6.34 09/03/2019    HGB 15.4 09/03/2019    HCT 45.1 09/03/2019    MCV 87.6 09/03/2019     09/03/2019     Lab Results   Component Value Date    NEUTROABS 4.59 09/03/2019     Lab Results   Component Value Date    CEA 18.21 08/01/2019     Glucose   Date Value Ref Range Status   09/03/2019 114 74 - 124 mg/dL Final     BUN   Date Value Ref Range Status   09/03/2019 7 6 - 20 mg/dL Final     Creatinine   Date Value Ref Range Status   09/03/2019 0.95 0.60 - 1.10 mg/dL Final     Sodium   Date Value Ref Range Status   09/03/2019 138 134 - 145 mmol/L Final     Potassium   Date Value Ref Range Status   09/03/2019 3.3 (L) 3.5 - 4.7 mmol/L Final     Chloride   Date Value Ref Range Status   09/03/2019 99 98 - 107 mmol/L Final     CO2   Date Value Ref Range Status   09/03/2019 22.8 22.0 - 29.0 mmol/L Final     Calcium   Date Value Ref Range Status   09/03/2019 9.1 8.5 - 10.2 mg/dL Final     Total Protein   Date Value Ref Range Status   09/03/2019 6.9 6.3 - 8.0 g/dL Final     Albumin   Date Value Ref Range Status   09/03/2019 3.90 3.50 - 5.20 g/dL Final     ALT (SGPT)   Date Value Ref Range Status   09/03/2019 28 0 - 33 U/L Final     AST (SGOT)   Date Value Ref Range Status   09/03/2019 16 0 - 32 U/L Final     Alkaline Phosphatase   Date Value Ref Range Status   09/03/2019 71 38 - 116 U/L Final     Total  Bilirubin   Date Value Ref Range Status   09/03/2019 0.3 0.2 - 1.2 mg/dL Final     eGFR Non  Amer   Date Value Ref Range Status   09/03/2019 65 >60 mL/min/1.73 Final     BUN/Creatinine Ratio   Date Value Ref Range Status   09/03/2019 7.4 7.3 - 30.0 Final     Anion Gap   Date Value Ref Range Status   09/03/2019 16.2 (H) 5.0 - 15.0 mmol/L Final           RECENT IMAGING  CT NECK AND HEAD - 08/21/2019  CT HEAD WITH AND WITHOUT CONTRAST: The brain and ventricles are  symmetrical. There is no evidence of hemorrhage, hydrocephalus or of  abnormal extra-axial fluid. No focal area of decreased attenuation to  suggest infarction or edema is appreciated. After contrast  administration, there was no evidence of abnormal enhancement. A mucus  retention cyst involving the left maxillary sinus is noted posteriorly.     IMPRESSION:  No evidence of abnormal enhancement to suggest metastatic  disease. Further evaluation could be performed with MRI examination of  the brain which would be more sensitive in that regard.      CT NECK WITH CONTRAST:     The parotid and submandibular glands appear unremarkable. The right lobe  of the thyroid appears unremarkable. There is a 5 mm area of decreased  attenuation involving the left lobe of the thyroid gland, nonspecific.  This may represent a small cyst. It appears unchanged versus the CT  examination of the chest performed on 07/19/2019.  Small subcentimeter nodes are identified involving the posterior  triangle of the neck bilaterally. A 10 mm node is appreciated involving  the anterior jugulodigastric region on the left. No dominant mass or  obvious pathologic adenopathy is appreciated. Mucous retention cysts  involving the left maxillary sinus are noted inferiorly.  A 7 mm nodule is appreciated involving the right upper lobe anteriorly,  present on the CT examination of the chest of 07/19/2019, unchanged.     IMPRESSION:  1. There is no evidence of a dominant cervical mass or  obvious  pathologic adenopathy. A 10 mm node is appreciated involving the  jugulodigastric region on the left.  2. 7 mm nodule involving the right upper lobe anteriorly, nonspecific.  Metastatic disease cannot be excluded.         NM PET SKULL BASE TO MID THIGH - 08/08/2019  FINDINGS: The approximately 5 cm annular rectal mass is intensely hypermetabolic with a maximal SUV of 49.3. The rectal mass has linear extensions to the posterior wall of the uterus and these linear extensions appear to be hypermetabolic. There is a hypermetabolic 0.6 cm left perirectal node with a maximal SUV of 5.6. There are also tiny nodes along the right external and internal iliac chains which are hypermetabolic. There is no definite hypermetabolic lymphadenopathy within the abdomen. There is a fairly typical speckled pattern of activity throughout the liver. There is more than typical hypermetabolic activity along the distal esophagus which appears marginally thickened and the maximal SUV is 8.6. There is a solitary hypermetabolic 0.8 cm nodule within the anterior aspect of the right upper lobe with a maximal SUV of 5.1. There is no suspicious hypermetabolic activity within the mediastinum or kalina. There is a 1.3 x 0.9 cm node along the left jugular chain which has a maximal SUV of 4.5. There is a smaller node along the right jugular chain which has a maximal SUV of 2.6. There is a speckled pattern of marrow activity diffusely and the intensity of some of the foci is more prominent than typical. The hypermetabolic focus within the left iliac body has a maximal SUV of 5.7. Foci at the sacrum have a maximal SUV of 5.9. Activity at L5 has a maximal SUV of 5.9.     IMPRESSION:  1. The approximately 5 cm annular rectal mass is markedly intensely hypermetabolic. There is likely direct extension to the posterior wall of the uterus. The hypermetabolic pelvic nodes are likely metastatic. The 8 mm hypermetabolic right upper lobe pulmonary nodule  is likely metastatic as well.  2. The mildly hypermetabolic neck nodes are indeterminate. Conservative surveillance is recommended.  3. The more than typical intense speckled activity throughout the bones is of uncertain etiology. No definite lesions are seen on the corresponding CT sequence. The appearance can be seen with bone marrow stimulants. The hypermetabolic foci do not correspond to the multiple sclerotic lesions within the pelvic bones. 1 of the largest measures approximately 1.8 cm at the medial roof of the left acetabulum. This sclerotic lesion and other sclerotic foci were not present on a previous pelvic CT from 08/09/2007, but there are a few tiny sclerotic foci which  were present previously and are either stable or slightly larger. Other than the pelvic bones, there are otherwise no sclerotic foci within the bones.  4. Hypermetabolic activity along the marginally thickened distal esophagus is nonspecific. Please correlate clinically for esophagitis.    BILATERAL SCREENING MAMMOGRAM AND U/S - 08/01/2019  No mammographic evidence of malignancy.    Assessment/Plan    1.  Newly diagnosed rectal cancer, rectal ultrasound examination reported T3N0 disease without lymphadenopathy. CT scan of chest, abdomen and pelvis reported no lymphadenopathy in the abdomen, pelvis or chest. She does have small pulmonary nodule 6-7 mm and 2 mm with indeterminate feature. There is no solid evidence of metastatic disease otherwise.   · Based on the CT scan and rectal ultrasound imaging studies, this patient had stage IIA (T3N0M0) disease.    · She had PET scan examination on 8/8/2019 which reported a hypermetabolic right upper lobe pulmonary nodule 6 mm with SUV 5.6.  This is suspicious for metastatic disease.  This patient may have stage IV disease.   · Dr. Lau discussed with me about this case, given the benefits of doubt, we feel it would be the best interest for this patient to treat her as having localized disease,  with the intention to cure.   · Patient was started on concurrent chemoradiotherapy on 8/12/2019.  She has been given infusion of 5-FU.   · The patient returns today to proceed with her 4th week of neoadjuvant chemoradiation with infusional 5-FU.  She will subsequently undergo surgical resection and then proceed with 8 cycles of FOLFOX 6.  · She reports significant watery diarrhea up to 9-10 times a day.  She has been using Imodium up to 3 times a day, due to concern for possible constipation when taking too many Imodium tablet.  She also has pelvic cramping, which has been helpful with antispasmodics.    · I will contact Dr. Ye in order to reevaluate the patient's need for surgical intervention.    2.  This patient has also strong family history for malignancy and I recommended to refer her for genetic counseling.  The patient had an appointment scheduled for September 3, 2019.    3.  Family history of positive factor V Leiden mutation with her mother, who never had thrombophilia.  This was checked prior to starting her on female hormonal supplementation.    · The patient is heterozygous factor V Leiden and therefore will require low-dose anticoagulation with Lovenox, 40 mg daily given her increased risk of thrombosis with MediPort and GI malignancy.    · She has minimal bruising and is rotating the sites of injection.  She will continue daily.    4. Loose, frequent stools: Patient reports that she has been experiencing diarrhea up to 9-10 times daily. She continues using Imodium for relief.  We will continue to monitor this closely.  ·  I advised the patient to utilize Imodium more frequently today.    5. Pelvic pain: Radiation-related. Tylenol not to exceed 3000 mg daily. The patient continues on Hyoscyamine for relief.  Stable at this point.  We will continue to monitor.    6. Hypokalemia: Laboratory study performed on 09/03/2019 reported potassium low at 3.3.  This is likely secondary to her severe diarrhea  with wasting of potassium.  · We will start the patient oral potassium 20 meq to manage this.    · We will check her serum magnesium level also.     PLAN:  1. The patient will proceed with week #4 day-1 of combined chemoradiation with infusional 5-FU today (due to the Labor Day holiday, this week she will only have 96 hours 5-FU chemotherapy in the 4 days radiation therapy).   2. She will continue daily Lovenox shots for prophylaxis of DVT.  3. Continue Imodium for diarrhea, increased more frequent, can dose up to 8 times a day.   4. Continue Tylenol for pelvic pain  5. Continue Hyoscyamine for relief of abdominal cramping.  6. Patient to start oral potassium 20 meq to manage hypokalemia.  I called and left a message for patient,  And e-scribed to her pharmacy.   7. Check magnesium level today.   8. Follow up with NP in 1 week for chemo with labs  9. Follow up with me in 2 weeks for chemo week #6 day-1 with labs.  10. I will contact Dr. Ye in order to further evaluate the patient's candidacy for surgical intervention.   11. I have asked the patient to call our office with any new issues or concerns prior to her next office visit.  She has verbalized understanding.    Patient is accompanied by her mother today.    Patient is on drug therapy requiring extensive monitoring.     I, Chandni Hubre, acted as scribe for Dong Nguyen MD PhD  in documenting the service or procedure. To the best of my knowledge, I recorded what was dictated by Dong Nguyen MD PhD      I reviewed the note scribed by Ms. Chandni Huber and made appropriate corrections.         Addendum:    Hypomagnesemia:   Magnesium level 1.7.  We will give patient IV magnesium sulfate at 2 g this week.  I called and left a message for patient to call back.    I also called and spoke to colorectal surgeon Dr. Ye today to discuss management plan.   We will stick with the original plan for surgery and then further adjuvant chemotherapy.  We will  pursue PET scan examination about 5 to 6 weeks after she finished radiation therapy to assess her response prior to the surgery.      Dong Nguyen MD PhD  09/03/2019    CC  MD Dr. Leyda Rizo MD Dr. Carole Scharf, MD

## 2019-09-03 NOTE — PROGRESS NOTES
Port accessed using sterile technique.  Excellent blood return noted.  IV 5-FU ball connected, to infuse over 96 hours.  Pt to return Friday afternoon for unhook.    CMP drawn.  Pt did not have to wait for results per Dr. Nguyen.  Pt d/c'd home.

## 2019-09-04 ENCOUNTER — TELEPHONE (OUTPATIENT)
Dept: GENERAL RADIOLOGY | Facility: HOSPITAL | Age: 39
End: 2019-09-04

## 2019-09-04 ENCOUNTER — DOCUMENTATION (OUTPATIENT)
Dept: NUTRITION | Facility: HOSPITAL | Age: 39
End: 2019-09-04

## 2019-09-04 PROCEDURE — 77014 CHG CT GUIDANCE RADIATION THERAPY FLDS PLACEMENT: CPT | Performed by: RADIOLOGY

## 2019-09-04 PROCEDURE — 77386 CHG INTENSITY MODULATED RADIATION TX DLVR COMPLEX: CPT | Performed by: RADIOLOGY

## 2019-09-04 PROCEDURE — 77386: CPT | Performed by: RADIOLOGY

## 2019-09-04 PROCEDURE — 77427 RADIATION TX MANAGEMENT X5: CPT | Performed by: RADIOLOGY

## 2019-09-04 NOTE — TELEPHONE ENCOUNTER
----- Message from Dong Nguyen MD PhD sent at 9/3/2019  4:38 PM EDT -----  Regarding: iv mag sulfate  I called and left a message for patient to call back, we need to give her IV magnesium sulfate 2 g this week.    Thank you very much!    Dr. Nguyen

## 2019-09-04 NOTE — PROGRESS NOTES
Oncology Nutrition     Patient Name:  Carole Weaver  YOB: 1980  MRN: [id number]  Date:  09/04/19  Physician:  Judi Nguyen    Type of Cancer Treatment:   Radiation/Cyberknife: Number of Treatments:  28  Chemotherapy:  Type: 5FU    Patient Active Problem List   Diagnosis   • Anxiety   • Irritable bowel syndrome   • Immunization due   • Monopolar depression (CMS/HCC)   • Adenocarcinoma of rectum (CMS/HCC)   • Family history of factor V Leiden mutation   • Heterozygous factor V Leiden mutation (CMS/HCC)   • Encounter for fitting and adjustment of vascular catheter   • Functional diarrhea   • Adverse effect of antineoplastic and immunosuppressive drugs, initial encounter   • Hypokalemia   • Hypomagnesemia       Current Outpatient Medications   Medication Sig Dispense Refill   • clonazePAM (KLONOPIN) 0.5 MG tablet Take 1 tablet by mouth 2 (Two) Times a Day As Needed for Seizures. 60 tablet 5   • dicyclomine (BENTYL) 20 MG tablet Take 1 tablet by mouth Every 6 (Six) Hours. (Patient taking differently: Take 20 mg by mouth As Needed.) 120 tablet 6   • enoxaparin (LOVENOX) 40 MG/0.4ML solution syringe Inject 0.4 mL under the skin into the appropriate area as directed Every 12 (Twelve) Hours. 12 mL 11   • escitalopram (LEXAPRO) 20 MG tablet Take 1 tablet by mouth Daily. 90 tablet 3   • HYDROcodone-acetaminophen (NORCO) 5-325 MG per tablet 1-2 by mouth every four hours as needed for pain 30 tablet 0   • hyoscyamine (ANASPAZ,LEVSIN) 0.125 MG tablet Take 1 tablet by mouth Every 6 (Six) Hours As Needed for Cramping. 30 tablet 2   • ondansetron (ZOFRAN) 4 MG tablet Take 1 tablet by mouth Every 6 (Six) Hours As Needed for Nausea or Vomiting for up to 10 doses. 10 tablet 1   • ondansetron (ZOFRAN) 8 MG tablet Take 1 tablet by mouth 3 (Three) Times a Day As Needed for Nausea or Vomiting. 30 tablet 5   • potassium chloride (K-DUR,KLOR-CON) 20 MEQ CR tablet Take 1 tablet by mouth Daily. 30 tablet 3   • pramoxine  "(PROCTOFOAM) 1 % foam Insert  into the rectum Every 2 (Two) Hours As Needed for Hemorrhoids. 15 g 2     No current facility-administered medications for this visit.        Glycemic Risk:   n/a    Weight:   Height: 65\"  Weight: 225 lbs.  Usual Body Weight:232 lbs.   BMI: 37  Weight has decreased 6 pounds over last 1 week    Oral Food Intake:  Regular Diet - No Restrictions  Compared to normal intake, current food intake is less than normal    Hydration Status:   How many 8 ounce glass of water of fluid do you drink per day?  8    Nutrition Symptoms:   Altered Apetite  Constipation  Diarrhea    Activity:   Not my normal self, but able to be up and about with fairly normal activities     reports that she has been smoking electronic cigarette and cigarettes.  She has a 20.00 pack-year smoking history. She has never used smokeless tobacco. She reports that she drinks alcohol. She reports that she does not use drugs.    Evaluation of Nutritional Risk:   Patient identified to be at nutritional risk and/or for nutritional consultation; will follow patient through course of treatment.   Diagnosis  Weight Change  Nutrition Impact Symptoms  Patient Education    Met with patient today in the radiation center. She had been not eating for a few days but is now eating again. States that her  is making her eat. She has no appetite and is having diarrhea, several loose watery stools per day but also feeling like she needs to strain with each bowel movement which is causing a good deal of pain. She is now taking the imodium.   She is eating grilled cheese, drinking soft drinks, water.   I reviewed a long list of foods that she could incorporate into her diet explaining that she needs calories, protein, and hydration.  I suggested foods with soluble fiber as opposed to insoluble and gave examples. She has Boost but has not tried it yet. I gave her alternatives if she does not like it.   I asked her to eat something every two to " three hours and explained how this would be better tolerated. I also recommended that she use gatorade. She may also benefit from addition of metamucil but will discuss with MD.   The patient was given my contact info for any questions. Will continue to follow closely.     Electronically signed by:  Anay Ortega RD, LD  4:42 PM

## 2019-09-05 ENCOUNTER — RADIATION ONCOLOGY WEEKLY ASSESSMENT (OUTPATIENT)
Dept: RADIATION ONCOLOGY | Facility: HOSPITAL | Age: 39
End: 2019-09-05

## 2019-09-05 VITALS
SYSTOLIC BLOOD PRESSURE: 124 MMHG | DIASTOLIC BLOOD PRESSURE: 92 MMHG | BODY MASS INDEX: 37.3 KG/M2 | HEART RATE: 89 BPM | WEIGHT: 226.6 LBS | OXYGEN SATURATION: 97 %

## 2019-09-05 DIAGNOSIS — C20 ADENOCARCINOMA OF RECTUM (HCC): ICD-10-CM

## 2019-09-05 DIAGNOSIS — C20 ADENOCARCINOMA OF RECTUM (HCC): Primary | ICD-10-CM

## 2019-09-05 PROCEDURE — 77386: CPT | Performed by: RADIOLOGY

## 2019-09-05 PROCEDURE — 77386 CHG INTENSITY MODULATED RADIATION TX DLVR COMPLEX: CPT | Performed by: RADIOLOGY

## 2019-09-05 PROCEDURE — 77014 CHG CT GUIDANCE RADIATION THERAPY FLDS PLACEMENT: CPT | Performed by: RADIOLOGY

## 2019-09-05 NOTE — PROGRESS NOTES
Physician Weekly Management Note    Diagnosis:   Adenocarcinoma of rectum (CMS/HCC) Cancer Staging  Stage IIA (cT3, cN0, cM0)    Reason for Visit: Radiation (18/28)    Concurrent Chemo:   Yes    Notes on Treatment course, Films, Medical progress and Plan:  Much better! Eating and stool going better. Weight stable. Anay helping a lot. Continue on.     Performance Status:  (1) Restricted in physically strenuous activity, ambulatory and able to do work of light nature    ROS - Other than as listed above, as follows:  Constitutional - Normal - no complaints of fatigue, denies lack of appetite, fever, night sweats or change in weight.  Cardiovascular - Normal - denies arrhythmias, chest pain, dyspnea, edema, orthopnea or palpitations.  Respiratory - Normal - denies cough, dyspnea, hemoptysis, hiccoughs, pleuritic chest pain or wheezing.  Gastrointestinal - Normal - no complaints of constipation, abdominal pain, diarrhea, heartburn/dyspepsia, hematemesis, hemorrhoids, melena or GI bleeding, nausea, pain or cramping or vomiting.    PHYSICAL EXAM - Other than as listed above, as follows:  Vitals:    09/05/19 1603   BP: 124/92   Pulse: 89   SpO2: 97%   Weight: 103 kg (226 lb 9.6 oz)   PainSc: 0-No pain         Constitutional - Normal - no evidence of impaired alertness, inadequate appearance, premature or advanced chronologic age, uncooperativeness, altered mood, affect or disorientation.    Problem added:  No problems updated.  Medications added: No orders of the defined types were placed in this encounter.    Ancillary referrals made:   No orders of the defined types were placed in this encounter.      Technical aspects reviewed:  Weekly OBI approved if applicable? Yes  Weekly port films approved?   Yes  Change requests noted if applicable?  Yes  Patient setup and plan reviewed?  Yes    Chart Reviewed:  Continue current treatment plan?  Yes  Treatment plan change requested?  No    I have reviewed and marked as  "\"reviewed\" the current medications, allergies and problem list in the patients EMR.    I have reviewed the patient's medical, surgical  history in detail, reviewed any pertinent lab work  and updated the computerized patient record if needed.    Patient's Care Team:  Patient Care Team:  Minesh Leone MD as PCP - General (Family Medicine)  Padmaja Ye MD as Referring Physician (Colon and Rectal Surgery)  Beatrice Lau MD as Consulting Physician (Radiation Oncology)  Dong Nguyen MD PhD as Consulting Physician (Hematology and Oncology)  Wendy Cottrell MD as Consulting Physician (Obstetrics and Gynecology)  Roland Thorpe MD as Consulting Physician (Gastroenterology)        "

## 2019-09-06 ENCOUNTER — APPOINTMENT (OUTPATIENT)
Dept: ONCOLOGY | Facility: HOSPITAL | Age: 39
End: 2019-09-06

## 2019-09-06 ENCOUNTER — INFUSION (OUTPATIENT)
Dept: ONCOLOGY | Facility: HOSPITAL | Age: 39
End: 2019-09-06

## 2019-09-06 VITALS
DIASTOLIC BLOOD PRESSURE: 90 MMHG | OXYGEN SATURATION: 96 % | TEMPERATURE: 97.6 F | SYSTOLIC BLOOD PRESSURE: 141 MMHG | BODY MASS INDEX: 37.47 KG/M2 | HEART RATE: 76 BPM | WEIGHT: 227.6 LBS

## 2019-09-06 DIAGNOSIS — C20 ADENOCARCINOMA OF RECTUM (HCC): Primary | ICD-10-CM

## 2019-09-06 PROCEDURE — 25010000002 MAGNESIUM SULFATE 2 GM/50ML SOLUTION: Performed by: INTERNAL MEDICINE

## 2019-09-06 PROCEDURE — 77386 CHG INTENSITY MODULATED RADIATION TX DLVR COMPLEX: CPT | Performed by: RADIOLOGY

## 2019-09-06 PROCEDURE — 96365 THER/PROPH/DIAG IV INF INIT: CPT | Performed by: INTERNAL MEDICINE

## 2019-09-06 PROCEDURE — 96416 CHEMO PROLONG INFUSE W/PUMP: CPT | Performed by: INTERNAL MEDICINE

## 2019-09-06 PROCEDURE — 25010000002 FLUOROURACIL PER 500 MG: Performed by: INTERNAL MEDICINE

## 2019-09-06 PROCEDURE — 77386: CPT | Performed by: RADIOLOGY

## 2019-09-06 PROCEDURE — 77014 CHG CT GUIDANCE RADIATION THERAPY FLDS PLACEMENT: CPT | Performed by: RADIOLOGY

## 2019-09-06 RX ORDER — MAGNESIUM SULFATE HEPTAHYDRATE 40 MG/ML
2 INJECTION, SOLUTION INTRAVENOUS ONCE
Status: COMPLETED | OUTPATIENT
Start: 2019-09-06 | End: 2019-09-06

## 2019-09-06 RX ADMIN — MAGNESIUM SULFATE IN WATER 2 G: 40 INJECTION, SOLUTION INTRAVENOUS at 15:25

## 2019-09-06 RX ADMIN — FLUOROURACIL 1450 MG: 50 INJECTION, SOLUTION INTRAVENOUS at 16:34

## 2019-09-06 NOTE — PATIENT INSTRUCTIONS
Pt seen by Dr. Huntley for genetic counseling and testing. Lab drawn with 21g butterfly needle in Right AC x 1 attempt. Sent to [facility]. Pt informed she would receive a phone call when test results.

## 2019-09-08 PROCEDURE — 77336 RADIATION PHYSICS CONSULT: CPT | Performed by: RADIOLOGY

## 2019-09-09 ENCOUNTER — LAB (OUTPATIENT)
Dept: LAB | Facility: HOSPITAL | Age: 39
End: 2019-09-09

## 2019-09-09 ENCOUNTER — INFUSION (OUTPATIENT)
Dept: ONCOLOGY | Facility: HOSPITAL | Age: 39
End: 2019-09-09

## 2019-09-09 ENCOUNTER — OFFICE VISIT (OUTPATIENT)
Dept: ONCOLOGY | Facility: CLINIC | Age: 39
End: 2019-09-09

## 2019-09-09 VITALS
WEIGHT: 226.7 LBS | BODY MASS INDEX: 37.77 KG/M2 | HEART RATE: 93 BPM | OXYGEN SATURATION: 96 % | HEIGHT: 65 IN | TEMPERATURE: 98 F | DIASTOLIC BLOOD PRESSURE: 85 MMHG | RESPIRATION RATE: 18 BRPM | SYSTOLIC BLOOD PRESSURE: 133 MMHG

## 2019-09-09 DIAGNOSIS — Z83.2 FAMILY HISTORY OF FACTOR V LEIDEN MUTATION: ICD-10-CM

## 2019-09-09 DIAGNOSIS — E87.6 HYPOKALEMIA: ICD-10-CM

## 2019-09-09 DIAGNOSIS — C20 ADENOCARCINOMA OF RECTUM (HCC): Primary | ICD-10-CM

## 2019-09-09 DIAGNOSIS — E83.42 HYPOMAGNESEMIA: ICD-10-CM

## 2019-09-09 DIAGNOSIS — C20 ADENOCARCINOMA OF RECTUM (HCC): ICD-10-CM

## 2019-09-09 DIAGNOSIS — K59.1 FUNCTIONAL DIARRHEA: ICD-10-CM

## 2019-09-09 LAB
ALBUMIN SERPL-MCNC: 3.5 G/DL (ref 3.5–5.2)
ALBUMIN/GLOB SERPL: 1.3 G/DL (ref 1.1–2.4)
ALP SERPL-CCNC: 61 U/L (ref 38–116)
ALT SERPL W P-5'-P-CCNC: 37 U/L (ref 0–33)
ANION GAP SERPL CALCULATED.3IONS-SCNC: 10.2 MMOL/L (ref 5–15)
AST SERPL-CCNC: 23 U/L (ref 0–32)
BASOPHILS # BLD AUTO: 0.03 10*3/MM3 (ref 0–0.2)
BASOPHILS NFR BLD AUTO: 0.4 % (ref 0–1.5)
BILIRUB SERPL-MCNC: 0.3 MG/DL (ref 0.2–1.2)
BUN BLD-MCNC: 6 MG/DL (ref 6–20)
BUN/CREAT SERPL: 7.9 (ref 7.3–30)
CALCIUM SPEC-SCNC: 8.6 MG/DL (ref 8.5–10.2)
CHLORIDE SERPL-SCNC: 103 MMOL/L (ref 98–107)
CO2 SERPL-SCNC: 24.8 MMOL/L (ref 22–29)
CREAT BLD-MCNC: 0.76 MG/DL (ref 0.6–1.1)
DEPRECATED RDW RBC AUTO: 46.7 FL (ref 37–54)
EOSINOPHIL # BLD AUTO: 0.26 10*3/MM3 (ref 0–0.4)
EOSINOPHIL NFR BLD AUTO: 3.4 % (ref 0.3–6.2)
ERYTHROCYTE [DISTWIDTH] IN BLOOD BY AUTOMATED COUNT: 15.5 % (ref 12.3–15.4)
GFR SERPL CREATININE-BSD FRML MDRD: 84 ML/MIN/1.73
GLOBULIN UR ELPH-MCNC: 2.8 GM/DL (ref 1.8–3.5)
GLUCOSE BLD-MCNC: 105 MG/DL (ref 74–124)
HCT VFR BLD AUTO: 44.4 % (ref 34–46.6)
HGB BLD-MCNC: 15.1 G/DL (ref 12–15.9)
IMM GRANULOCYTES # BLD AUTO: 0.05 10*3/MM3 (ref 0–0.05)
IMM GRANULOCYTES NFR BLD AUTO: 0.7 % (ref 0–0.5)
LYMPHOCYTES # BLD AUTO: 0.47 10*3/MM3 (ref 0.7–3.1)
LYMPHOCYTES NFR BLD AUTO: 6.1 % (ref 19.6–45.3)
MAGNESIUM SERPL-MCNC: 2.1 MG/DL (ref 1.8–2.5)
MCH RBC QN AUTO: 30 PG (ref 26.6–33)
MCHC RBC AUTO-ENTMCNC: 34 G/DL (ref 31.5–35.7)
MCV RBC AUTO: 88.1 FL (ref 79–97)
MONOCYTES # BLD AUTO: 0.52 10*3/MM3 (ref 0.1–0.9)
MONOCYTES NFR BLD AUTO: 6.8 % (ref 5–12)
NEUTROPHILS # BLD AUTO: 6.34 10*3/MM3 (ref 1.7–7)
NEUTROPHILS NFR BLD AUTO: 82.6 % (ref 42.7–76)
NRBC BLD AUTO-RTO: 0 /100 WBC (ref 0–0.2)
PLATELET # BLD AUTO: 198 10*3/MM3 (ref 140–450)
PMV BLD AUTO: 8.9 FL (ref 6–12)
POTASSIUM BLD-SCNC: 4.3 MMOL/L (ref 3.5–4.7)
PROT SERPL-MCNC: 6.3 G/DL (ref 6.3–8)
RBC # BLD AUTO: 5.04 10*6/MM3 (ref 3.77–5.28)
SODIUM BLD-SCNC: 138 MMOL/L (ref 134–145)
WBC NRBC COR # BLD: 7.67 10*3/MM3 (ref 3.4–10.8)

## 2019-09-09 PROCEDURE — 77386: CPT | Performed by: RADIOLOGY

## 2019-09-09 PROCEDURE — 96416 CHEMO PROLONG INFUSE W/PUMP: CPT | Performed by: NURSE PRACTITIONER

## 2019-09-09 PROCEDURE — 83735 ASSAY OF MAGNESIUM: CPT | Performed by: NURSE PRACTITIONER

## 2019-09-09 PROCEDURE — 80053 COMPREHEN METABOLIC PANEL: CPT | Performed by: NURSE PRACTITIONER

## 2019-09-09 PROCEDURE — 99214 OFFICE O/P EST MOD 30 MIN: CPT | Performed by: NURSE PRACTITIONER

## 2019-09-09 PROCEDURE — 77014 CHG CT GUIDANCE RADIATION THERAPY FLDS PLACEMENT: CPT | Performed by: RADIOLOGY

## 2019-09-09 PROCEDURE — 77386 CHG INTENSITY MODULATED RADIATION TX DLVR COMPLEX: CPT | Performed by: RADIOLOGY

## 2019-09-09 PROCEDURE — 85025 COMPLETE CBC W/AUTO DIFF WBC: CPT | Performed by: NURSE PRACTITIONER

## 2019-09-09 PROCEDURE — 25010000002 FLUOROURACIL PER 500 MG: Performed by: NURSE PRACTITIONER

## 2019-09-09 PROCEDURE — 36416 COLLJ CAPILLARY BLOOD SPEC: CPT | Performed by: NURSE PRACTITIONER

## 2019-09-09 RX ORDER — LOPERAMIDE HYDROCHLORIDE 2 MG/1
4 CAPSULE ORAL ONCE
Status: COMPLETED | OUTPATIENT
Start: 2019-09-09 | End: 2019-09-09

## 2019-09-09 RX ADMIN — FLUOROURACIL 2420 MG: 50 INJECTION, SOLUTION INTRAVENOUS at 13:18

## 2019-09-09 RX ADMIN — LOPERAMIDE HYDROCHLORIDE 4 MG: 2 CAPSULE ORAL at 12:29

## 2019-09-09 NOTE — PROGRESS NOTES
REASON FOR FOLLOW UP:     1. Newly diagnosed rectal cancer, rectal ultrasound examination reported T3N0 disease without lymphadenopathy. She does have small pulmonary nodule 6-7 mm and 2 mm with indeterminate feature. There is no solid evidence of metastatic disease otherwise. Patient has stage IIA (T3N0M0) disease.  2. The patient is heterozygous factor V Leiden, currently on prophylactic anticoagulation with Lovenox 40 mg daily given her increased risk of thrombosis with MediPort and GI malignancy.   3.  The 8 mm hypermetabolic right upper lobe pulmonary nodule is suspicious for metastatic as well.    4.  Patient had a PowerPort placement on the left upper chest by Dr. oJseph on 8/9/2019.  5.  Patient was started on concurrent infusional 5-FU chemoradiation therapy on 8/12/2019, with planned complete surgical resection and further adjuvant chemotherapy with intention to cure the disease.     HISTORY OF PRESENT ILLNESS:  The patient is a 39 y.o. year old female who returns today for one week follow up in anticipation of Cycle #5 day-1 of infusional 5-FU. Patient is accompanied by her mother today who helped with history.    Patient was started on concurrent chemoradiotherapy, with infusion of 5-FU on 8/12/2019. Last week, it was discovered that patient's chemotherapy 5FU ball was clamped and therefore she did not receive the 4 days of infusion planned (truncated due to holiday week). Patient received 3 days of 5-FU from Friday through Sunday (9/6-9/8) and returns today for week #6 per Dr. Nguyen's orders.    She comes to the office today with reports of a significant number of loose stools over the last 24 hours.  She describes the stools as soft and formed and not necessarily diarrhea, though admits that she has had at least 12 bowel movements.  Bowel movements have been quite painful secondary to several external hemorrhoids.  She has been reluctant to take Imodium and therefore has not taken any doses as  she is fearful of constipation.  In spite of this, she is eating and drinking adequately.  She has lost 5 pounds since August 29, 2019.  Abdominal cramping is well controlled with antispasmodics.  She denies any associated fevers or chills.  No reports of hand-foot syndrome.    She continues with radiation therapy and has had persistent pain of her vulva as a result.  She has a topical cream which has been beneficial in providing relief.    She continues on Lovenox, 40 mg daily for positive heterozygous factor V Leiden.  No issues with excess bleeding or bruising noted.    Labs are unremarkable today.    She has no other concerns today.            Recent PET imaging on 08/08/2019 reported that the 5 cm annular rectal mass is markedly intensely hypermetabolic. There is likely direct extension to the posterior wall of the uterus. The hypermetabolic pelvic nodes are likely metastatic. The 8 mm hypermetabolic right upper lobe pulmonary nodule is likely metastatic as well. The mildly hypermetabolic neck nodes are indeterminate. The more than typical intense speckled activity throughout the bones is of uncertain etiology. No definite lesions are seen on the corresponding CT sequence. The appearance can be seen with bone marrow stimulants. The hypermetabolic foci do not correspond to the multiple sclerotic lesions within the pelvic bones. 1 of the largest measures approximately 1.8 cm at the medial roof of the left acetabulum. This sclerotic lesion and other sclerotic foci were not present on a previous pelvic CT from 08/09/2007, but there are a few tiny sclerotic foci which were present previously and are either stable or slightly larger. Other than the pelvic bones, there are otherwise no sclerotic foci within the bones.Hypermetabolic activity along the marginally thickened distal esophagus is nonspecific.    Recent CT Head and Neck on 08/21/2019 reported no evidence of abnormal enhancement to suggest metastatic disease  in the head and no evidence of a dominant cervical mass or obvious pathologic adenopathy in the neck, however a 10 mm node is appreciated involving the jugulodigastric region on the left and there is a 7 mm nodule involving the right upper lobe anteriorly, nonspecific. Metastatic disease cannot be excluded.    Recent Bilateral Screening Mammogram on 2019 was benign.        Past Medical History:   Diagnosis Date   • Abnormal Pap smear of cervix 1998    JULIUS I   • Anxiety    • Cervical lymphadenitis 2012    SEEN AT [facility]   • Chronic diarrhea    • Colon cancer (CMS/HCC)     Stage III   ( STARTS RADIATION AND CHEMO ON 19 )   • Colon polyps    • Depression    • Factor V Leiden (CMS/HCC)     DX 2019   • GERD (gastroesophageal reflux disease)    • Hematochezia    • Hemorrhoids    • History of TB skin testing 2009    TB Skin Test   • HPV in female    • Infected insect bite of neck 2016    SEEN [facility]   • Irritable bowel syndrome    • Kidney stones 2007    SEEN AT [facility]   • Lumbar disc disease    • PIH (pregnancy induced hypertension)    • Rectal cancer (CMS/HCC) 2019    INVASIVE MODERATELY DIFFERENTIATED ADENOCARCINOMA   • Right leg pain    • SAB (spontaneous ) 1996     A2-1 INDUCED   • Sepsis due to cellulitis (CMS/HCC) 2002    LEFT GREAT TOE, ADMITTED TO [facility]     Past Surgical History:   Procedure Laterality Date   • CHOLECYSTECTOMY N/A 10/10/2003    LAPAROSCOPIC WITH CHOLANGIOGRAM, DR. JAMEY TALAVERA AT [facility]   • COLONOSCOPY W/ POLYPECTOMY N/A 2019    15 MM TUBULOVILLOUS ADENOMA POLYP IN HEPATIC FLEXURE, 20 MMTUBULOVILLOUS ADENOMA WITH HIGH GRADE DYSPLASIA IN RECTOSIGMOID, 4 CM MASS IN MID RECTUM, PATH: INVASIVE MODERATELY DIFFERENTIATED ADENOCARCINOMA, DR. JENNIFER LI AT [facility]   • DILATATION AND EVACUATION N/A    • ENDOSCOPY N/A 2019    LA GRADE A ESOPHAGITIS, GASTRITIS, ALL BIOPSIES BENIGN,   ROLAND LI AT [facility]   • PAP SMEAR N/A 01/24/2014   • SIGMOIDOSCOPY N/A 7/24/2019    INFILTRATIVE PARTIALLY OBSTRUCTING LARGE RECTAL CANCER, AREA TATOOED, DR. GERONIMO YE AT [facility]   • TRANSRECTAL ULTRASOUND N/A 7/24/2019    Procedure: ULTRASOUND TRANSRECTAL;  Surgeon: Geronimo Ye MD;  Location: [facility] ENDOSCOPY;  Service: General   • VAGINAL DELIVERY N/A 09/18/1998   • VENOUS ACCESS DEVICE (PORT) INSERTION Left 8/9/2019    Procedure: INSERTION VENOUS ACCESS DEVICE;  Surgeon: Sj Joseph MD;  Location: [facility] OR [facility];  Service: General   • WISDOM TOOTH EXTRACTION  1993       HEMATOLOGIC/ONCOLOGIC HISTORY:      The patient reports she has intermittent rectal bleeding and urgency, mucous with her stool, starting sometime in 2016. At that time she was referred to GI service, and was diagnosed of irritable bowel syndrome. Nevertheless she had worsening urgency for bowel movement, and had a couple of incidents losing control of stool. She was recently seen by Roland Li MD again and had colonoscopy and EGD exam on 07/08/2019. She was found having a circumferential rectal mass. According to the procedure note, the patient had a fungating circumferential bleeding 4 cm mass in the middle rectum, at distance between 13 cm and 17 cm from the anus. Mass was causing partial obstruction. There were also colon polyps found at the hepatic flexure and also at the rectosigmoid junction 23 cm from the anus. Both were resected and retrieved. EGD examination reported grade A esophagitis at the GE junction and patchy discontinuous erythema and aggravation of the mucosa without bleeding in the stomach body. There is normal mucosa of the duodenum. Pathology evaluation from this procedure reported moderately differentiated adenocarcinoma involving the rectal mass. The rectal sigmoid junction polyp was tubular/tubulovillous adenoma with high grade dysplasia negative for invasive malignancy. The hepatic flexure  polyp was also tubular/tubulovillous adenoma negative for high grade dysplasia or malignancy. The biopsy from the esophagus reports squamocolumnar mucosa with inflammatory changes suggestive of mild reflux esophagitis, negative for interstitial metaplasia. Gastric biopsy was benign and duodenal biopsy was also benign. There is no mention of H-pylori.     Patient was subsequently referred to colorectal surgeon Padmaja Ye MD for further evaluation. The patient had CT scan examination for chest, abdomen and pelvis requested by Dr. Ye and were done on 07/13/2019. The study reported no evidence of lymphadenopathy in the abdomen and pelvis. There was wall thickening of the rectosigmoid junction. Normal spleen, pancreas, adrenal glands and kidneys. There was fatty liver infiltration but no focal lymphatic lesions. There was a small 6-7 mm noncalcified nodule in the right upper lobe and a tiny 3 mm subpleural nodule in the right middle lobe. No mediastinal or hilar lymphadenopathy.     Dr. Ye performed staging rectal ultrasound examination. This study reported tumor penetrating through the muscularis propria as T3 disease; there were no lymph nodes identified.      MEDICATIONS    Current Outpatient Medications:   •  clonazePAM (KLONOPIN) 0.5 MG tablet, Take 1 tablet by mouth 2 (Two) Times a Day As Needed for Seizures., Disp: 60 tablet, Rfl: 5  •  dicyclomine (BENTYL) 20 MG tablet, Take 1 tablet by mouth Every 6 (Six) Hours. (Patient taking differently: Take 20 mg by mouth As Needed.), Disp: 120 tablet, Rfl: 6  •  enoxaparin (LOVENOX) 40 MG/0.4ML solution syringe, Inject 0.4 mL under the skin into the appropriate area as directed Every 12 (Twelve) Hours., Disp: 12 mL, Rfl: 11  •  escitalopram (LEXAPRO) 20 MG tablet, Take 1 tablet by mouth Daily., Disp: 90 tablet, Rfl: 3  •  HYDROcodone-acetaminophen (NORCO) 5-325 MG per tablet, 1-2 by mouth every four hours as needed for pain, Disp: 30 tablet, Rfl: 0  •  hyoscyamine  (ANASPAZ,LEVSIN) 0.125 MG tablet, Take 1 tablet by mouth Every 6 (Six) Hours As Needed for Cramping., Disp: 30 tablet, Rfl: 2  •  ondansetron (ZOFRAN) 4 MG tablet, Take 1 tablet by mouth Every 6 (Six) Hours As Needed for Nausea or Vomiting for up to 10 doses., Disp: 10 tablet, Rfl: 1  •  ondansetron (ZOFRAN) 8 MG tablet, Take 1 tablet by mouth 3 (Three) Times a Day As Needed for Nausea or Vomiting., Disp: 30 tablet, Rfl: 5  •  potassium chloride (K-DUR,KLOR-CON) 20 MEQ CR tablet, Take 1 tablet by mouth Daily., Disp: 30 tablet, Rfl: 3  •  pramoxine (PROCTOFOAM) 1 % foam, Insert  into the rectum Every 2 (Two) Hours As Needed for Hemorrhoids., Disp: 15 g, Rfl: 2    ALLERGIES:   No Known Allergies    SOCIAL HISTORY:       Social History     Socioeconomic History   • Marital status:      Spouse name: Justus   • Number of children: 1   • Years of education: Not on file   • Highest education level:  College   Occupational History     Employer: [employer]   Tobacco Use   • Smoking status: Current Every Day Smoker     Packs/day: 1.50     Years: 20.00     Pack years: 30.00     Types: Electronic Cigarette   • Smokeless tobacco: Never Used   Substance and Sexual Activity   • Alcohol use: Yes     Comment: Rarely   • Drug use: No   • Sexual activity: Defer         FAMILY HISTORY:   Mother has positive factor V Leiden mutation, although she did not have thrombosis, mother also is coronary disease with stenting, she also is occasional bruising.  Maternal grandmother had DVT, she had multiple surgical procedures.  Patient mother's half-brother had metastatic colon cancer at diagnosis in his 50s.  Maternal great aunt has breast cancer.  Patient will follow his skin cancer in his 60s, exclusive.    REVIEW OF SYSTEMS:  Review of Systems   Constitutional: Positive for unexpected weight change (See HPI ). Negative for appetite change, chills, diaphoresis, fatigue and fever.   HENT: Negative for congestion, hearing loss, mouth  sores, rhinorrhea, sore throat and trouble swallowing.    Eyes: Negative for visual disturbance.   Respiratory: Negative for cough, chest tightness, shortness of breath and wheezing.    Cardiovascular: Negative for chest pain, palpitations and leg swelling.   Gastrointestinal: Positive for diarrhea (See HPI ). Negative for abdominal distention, abdominal pain, anal bleeding (not reported today ), constipation, nausea (she denies any at this time) and vomiting.        See HPI    Endocrine: Negative for cold intolerance.   Genitourinary: Negative for dysuria, frequency, hematuria and urgency.   Musculoskeletal: Negative for arthralgias, back pain and joint swelling.        No muscle weakness.   Skin: Negative.  Negative for rash and wound.   Allergic/Immunologic: Negative for immunocompromised state.   Neurological: Negative for dizziness, seizures, syncope, speech difficulty, weakness, numbness and headaches.   Hematological: Negative for adenopathy. Bruises/bleeds easily (see HPI ).   Psychiatric/Behavioral: Negative for agitation, behavioral problems, confusion and suicidal ideas.          There were no vitals filed for this visit.  Current Status 9/3/2019   ECOG score 0      PHYSICAL EXAM:    GENERAL:  Well-developed, well-nourished  female in no acute distress.   SKIN:  Warm, dry without rashes, purpura or petechiae.  EYES:  Pupils equal, round and reactive to light.  EOMs intact.  Conjunctivae normal.  EARS:  Hearing intact.  NOSE:  No nasal discharge.  MOUTH:  Tongue is well-papillated; no stomatitis or ulcers.  Lips normal.  THROAT:  Oropharynx without lesions or exudates.  NECK:  Supple with good range of motion; no thyromegaly or masses.  LYMPHATICS:  No cervical, supraclavicular, axillary or inguinal adenopathy.  CHEST:  Lungs clear to auscultation. Good airflow.  CARDIAC:  Regular rate and rhythm without murmurs, rubs or gallops. Normal S1,S2.  ABDOMEN:  Soft, nontender with no hepatosplenomegaly  or masses. Bowel sounds normal.  EXTREMITIES:  No clubbing, cyanosis or edema.  NEUROLOGICAL:  Cranial Nerves II-XII grossly intact.  No focal neurological deficits.  PSYCHIATRIC:  Normal affect and mood.    Exam unchanged from previous    RECENT LABS:  Lab Results   Component Value Date    WBC 6.34 09/03/2019    HGB 15.4 09/03/2019    HCT 45.1 09/03/2019    MCV 87.6 09/03/2019     09/03/2019     Lab Results   Component Value Date    NEUTROABS 4.59 09/03/2019     Lab Results   Component Value Date    CEA 18.21 08/01/2019     Glucose   Date Value Ref Range Status   09/03/2019 114 74 - 124 mg/dL Final     BUN   Date Value Ref Range Status   09/03/2019 7 6 - 20 mg/dL Final     Creatinine   Date Value Ref Range Status   09/03/2019 0.95 0.60 - 1.10 mg/dL Final     Sodium   Date Value Ref Range Status   09/03/2019 138 134 - 145 mmol/L Final     Potassium   Date Value Ref Range Status   09/03/2019 3.3 (L) 3.5 - 4.7 mmol/L Final     Chloride   Date Value Ref Range Status   09/03/2019 99 98 - 107 mmol/L Final     CO2   Date Value Ref Range Status   09/03/2019 22.8 22.0 - 29.0 mmol/L Final     Calcium   Date Value Ref Range Status   09/03/2019 9.1 8.5 - 10.2 mg/dL Final     Total Protein   Date Value Ref Range Status   09/03/2019 6.9 6.3 - 8.0 g/dL Final     Albumin   Date Value Ref Range Status   09/03/2019 3.90 3.50 - 5.20 g/dL Final     ALT (SGPT)   Date Value Ref Range Status   09/03/2019 28 0 - 33 U/L Final     AST (SGOT)   Date Value Ref Range Status   09/03/2019 16 0 - 32 U/L Final     Alkaline Phosphatase   Date Value Ref Range Status   09/03/2019 71 38 - 116 U/L Final     Total Bilirubin   Date Value Ref Range Status   09/03/2019 0.3 0.2 - 1.2 mg/dL Final     eGFR Non  Amer   Date Value Ref Range Status   09/03/2019 65 >60 mL/min/1.73 Final     BUN/Creatinine Ratio   Date Value Ref Range Status   09/03/2019 7.4 7.3 - 30.0 Final     Anion Gap   Date Value Ref Range Status   09/03/2019 16.2 (H) 5.0 - 15.0  mmol/L Final           RECENT IMAGING  CT NECK AND HEAD - 08/21/2019  CT HEAD WITH AND WITHOUT CONTRAST: The brain and ventricles are  symmetrical. There is no evidence of hemorrhage, hydrocephalus or of  abnormal extra-axial fluid. No focal area of decreased attenuation to  suggest infarction or edema is appreciated. After contrast  administration, there was no evidence of abnormal enhancement. A mucus  retention cyst involving the left maxillary sinus is noted posteriorly.     IMPRESSION:  No evidence of abnormal enhancement to suggest metastatic  disease. Further evaluation could be performed with MRI examination of  the brain which would be more sensitive in that regard.      CT NECK WITH CONTRAST:     The parotid and submandibular glands appear unremarkable. The right lobe  of the thyroid appears unremarkable. There is a 5 mm area of decreased  attenuation involving the left lobe of the thyroid gland, nonspecific.  This may represent a small cyst. It appears unchanged versus the CT  examination of the chest performed on 07/19/2019.  Small subcentimeter nodes are identified involving the posterior  triangle of the neck bilaterally. A 10 mm node is appreciated involving  the anterior jugulodigastric region on the left. No dominant mass or  obvious pathologic adenopathy is appreciated. Mucous retention cysts  involving the left maxillary sinus are noted inferiorly.  A 7 mm nodule is appreciated involving the right upper lobe anteriorly,  present on the CT examination of the chest of 07/19/2019, unchanged.     IMPRESSION:  1. There is no evidence of a dominant cervical mass or obvious  pathologic adenopathy. A 10 mm node is appreciated involving the  jugulodigastric region on the left.  2. 7 mm nodule involving the right upper lobe anteriorly, nonspecific.  Metastatic disease cannot be excluded.         NM PET SKULL BASE TO MID THIGH - 08/08/2019  FINDINGS: The approximately 5 cm annular rectal mass is intensely  hypermetabolic with a maximal SUV of 49.3. The rectal mass has linear extensions to the posterior wall of the uterus and these linear extensions appear to be hypermetabolic. There is a hypermetabolic 0.6 cm left perirectal node with a maximal SUV of 5.6. There are also tiny nodes along the right external and internal iliac chains which are hypermetabolic. There is no definite hypermetabolic lymphadenopathy within the abdomen. There is a fairly typical speckled pattern of activity throughout the liver. There is more than typical hypermetabolic activity along the distal esophagus which appears marginally thickened and the maximal SUV is 8.6. There is a solitary hypermetabolic 0.8 cm nodule within the anterior aspect of the right upper lobe with a maximal SUV of 5.1. There is no suspicious hypermetabolic activity within the mediastinum or kalina. There is a 1.3 x 0.9 cm node along the left jugular chain which has a maximal SUV of 4.5. There is a smaller node along the right jugular chain which has a maximal SUV of 2.6. There is a speckled pattern of marrow activity diffusely and the intensity of some of the foci is more prominent than typical. The hypermetabolic focus within the left iliac body has a maximal SUV of 5.7. Foci at the sacrum have a maximal SUV of 5.9. Activity at L5 has a maximal SUV of 5.9.     IMPRESSION:  1. The approximately 5 cm annular rectal mass is markedly intensely hypermetabolic. There is likely direct extension to the posterior wall of the uterus. The hypermetabolic pelvic nodes are likely metastatic. The 8 mm hypermetabolic right upper lobe pulmonary nodule is likely metastatic as well.  2. The mildly hypermetabolic neck nodes are indeterminate. Conservative surveillance is recommended.  3. The more than typical intense speckled activity throughout the bones is of uncertain etiology. No definite lesions are seen on the corresponding CT sequence. The appearance can be seen with bone marrow  stimulants. The hypermetabolic foci do not correspond to the multiple sclerotic lesions within the pelvic bones. 1 of the largest measures approximately 1.8 cm at the medial roof of the left acetabulum. This sclerotic lesion and other sclerotic foci were not present on a previous pelvic CT from 08/09/2007, but there are a few tiny sclerotic foci which  were present previously and are either stable or slightly larger. Other than the pelvic bones, there are otherwise no sclerotic foci within the bones.  4. Hypermetabolic activity along the marginally thickened distal esophagus is nonspecific. Please correlate clinically for esophagitis.    BILATERAL SCREENING MAMMOGRAM AND U/S - 08/01/2019  No mammographic evidence of malignancy.    Assessment/Plan    1.  Newly diagnosed rectal cancer, rectal ultrasound examination reported T3N0 disease without lymphadenopathy. CT scan of chest, abdomen and pelvis reported no lymphadenopathy in the abdomen, pelvis or chest. She does have small pulmonary nodule 6-7 mm and 2 mm with indeterminate feature. There is no solid evidence of metastatic disease otherwise.   · Based on the CT scan and rectal ultrasound imaging studies, this patient had stage IIA (T3N0M0) disease.    · She had PET scan examination on 8/8/2019 which reported a hypermetabolic right upper lobe pulmonary nodule 6 mm with SUV 5.6.  This is suspicious for metastatic disease.  This patient may have stage IV disease.   · Dr. Lau discussed with me about this case, given the benefits of doubt, we feel it would be the best interest for this patient to treat her as having localized disease, with the intention to cure.   · Patient was started on concurrent chemoradiotherapy on 8/12/2019.  She has been given infusion of 5-FU.   · The patient returns today to proceed with her 5th week of neoadjuvant chemoradiation with infusional 5-FU.  She will subsequently undergo surgical resection and then proceed with 8 cycles of FOLFOX  6.  She is due for her 20th of 28 planned fractions of radiation today, also.    · As noted above, she was only able to receive 3 days of the 5-FU infusion last week.  Over the last 24 hours she has had a significant degree of soft, formed stools.  She remains reluctant to take the Imodium for fear of constipation and therefore has not taken any for relief.  After review with Dr. Nguyen, we will proceed with chemotherapy this week but have encouraged her to take up to 8 tablets of Imodium a day.  We could also add Lomotil if stool frequency has not been reduced.  We will administer 2 tablets of Imodium today along with her treatment.  We will need to continue to monitor this closely.  She will return on Thursday for reevaluation by a nurse practitioner to ensure that her bowels are under better control.  She will return in 1 week for her sixth cycle of chemotherapy with nurse practitioner visit prior      2.  This patient has also strong family history for malignancy the patient had appointment with genetic counseling on September 3, 2019.    3.  Family history of positive factor V Leiden mutation with her mother, who never had thrombophilia.  This was checked prior to starting her on female hormonal supplementation.    · The patient is heterozygous factor V Leiden and therefore will require low-dose anticoagulation with Lovenox, 40 mg daily given her increased risk of thrombosis with MediPort and GI malignancy.    · She is tolerating this well with minimal bruising.  She has been rotating injection sites    · 4. Loose, frequent stools:   · Please see above.  Have encouraged 8 tablets of Imodium a day.  May consider addition of Lomotil should loose stools persist.    5. Pelvic pain: Radiation-related. Tylenol not to exceed 3000 mg daily. The patient continues on Hyoscyamine for relief.  Stable at this point.  We will continue to monitor.    6. Hypokalemia: Laboratory study performed on 09/03/2019 reported potassium  low at 3.3.  This is likely secondary to her severe diarrhea with wasting of potassium.  · Patient is currently on potassium, 20 mEq daily orally  · Potassium is 4.3    7. Hypomagnesemia:   · Magnesium was low last week secondary to diarrhea.  She did require 2 g of IV magnesium on Friday, September 6  · Magnesium is 2.1    PLAN:  1. The patient will proceed with week #5 day-1 of combined chemoradiation with infusional 5-FU today  2. She will continue daily Lovenox shots for prophylaxis of DVT.  3. Reiterated Imodium schedule with the patient.  Up to 8 tablets daily.  May add Lomotil for additional control  4. Continue Tylenol for pelvic pain  5. Continue Hyoscyamine for relief of abdominal cramping.  6. Patient to continue potassium 20 meq to manage hypokalemia.    7. NP visit for reevaluation on Thursday, September 12, 2018  8. Nurse practitioner in 1 week on September 16, 2019 for consideration of cycle #6 of therapy  9. The current plan is for patient to undergo surgery by Dr. Ye and then proceed with further adjuvant chemotherapy.  A PET scan will be performed 5 to 6 weeks after she finishes radiation therapy to evaluate response prior to surgery    All the above was reviewed with Dr. Nguyen and he is in agreement              I also called and spoke to colorectal surgeon Dr. Ye today to discuss management plan.   We will stick with the original plan for surgery and then further adjuvant chemotherapy.  We will pursue PET scan examination about 5 to 6 weeks after she finished radiation therapy to assess her response prior to the surgery.      Alma De Anda, APRN  09/03/2019    CC  MD Dr. Leyda Rizo MD Dr. Carole Scharf, MD

## 2019-09-09 NOTE — PROGRESS NOTES
No additional fluids needed per Ahser NP based on labs. Both clamps opened on 5FU ball. No PRN adapter used.

## 2019-09-10 ENCOUNTER — APPOINTMENT (OUTPATIENT)
Dept: ONCOLOGY | Facility: CLINIC | Age: 39
End: 2019-09-10

## 2019-09-10 ENCOUNTER — APPOINTMENT (OUTPATIENT)
Dept: LAB | Facility: HOSPITAL | Age: 39
End: 2019-09-10

## 2019-09-10 ENCOUNTER — APPOINTMENT (OUTPATIENT)
Dept: ONCOLOGY | Facility: HOSPITAL | Age: 39
End: 2019-09-10

## 2019-09-10 PROCEDURE — 77014 CHG CT GUIDANCE RADIATION THERAPY FLDS PLACEMENT: CPT | Performed by: RADIOLOGY

## 2019-09-10 PROCEDURE — 77386: CPT | Performed by: RADIOLOGY

## 2019-09-10 PROCEDURE — 77386 CHG INTENSITY MODULATED RADIATION TX DLVR COMPLEX: CPT | Performed by: RADIOLOGY

## 2019-09-11 ENCOUNTER — RADIATION ONCOLOGY WEEKLY ASSESSMENT (OUTPATIENT)
Dept: RADIATION ONCOLOGY | Facility: HOSPITAL | Age: 39
End: 2019-09-11

## 2019-09-11 VITALS — SYSTOLIC BLOOD PRESSURE: 130 MMHG | HEART RATE: 87 BPM | OXYGEN SATURATION: 96 % | DIASTOLIC BLOOD PRESSURE: 93 MMHG

## 2019-09-11 DIAGNOSIS — C20 ADENOCARCINOMA OF RECTUM (HCC): Primary | ICD-10-CM

## 2019-09-11 DIAGNOSIS — C20 ADENOCARCINOMA OF RECTUM (HCC): ICD-10-CM

## 2019-09-11 PROCEDURE — 77427 RADIATION TX MANAGEMENT X5: CPT | Performed by: RADIOLOGY

## 2019-09-11 PROCEDURE — 77386: CPT | Performed by: RADIOLOGY

## 2019-09-11 PROCEDURE — 77386 CHG INTENSITY MODULATED RADIATION TX DLVR COMPLEX: CPT | Performed by: RADIOLOGY

## 2019-09-11 PROCEDURE — 77014 CHG CT GUIDANCE RADIATION THERAPY FLDS PLACEMENT: CPT | Performed by: RADIOLOGY

## 2019-09-11 NOTE — PROGRESS NOTES
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 Soaking. Some scant blood vaginally but none per rectum. Stool management stable. Some diarrhea but controlling it well. Using immodium. Weight stable. F/U in 4 weeks discussed. Continue on.     Performance Status:  (1) Restricted in physically strenuous activity, ambulatory and able to do work of light nature    ROS - Other than as listed above, as follows:  Constitutional - Normal - no complaints of fatigue, denies lack of appetite, fever, night sweats or change in weight.  Cardiovascular - Normal - denies arrhythmias, chest pain, dyspnea, edema, orthopnea or palpitations.  Respiratory - Normal - denies cough, dyspnea, hemoptysis, hiccoughs, pleuritic chest pain or wheezing.  Gastrointestinal - Normal - no complaints of constipation, abdominal pain, diarrhea, heartburn/dyspepsia, hematemesis, hemorrhoids, melena or GI bleeding, nausea, pain or cramping or vomiting.    PHYSICAL EXAM - Other than as listed above, as follows:  Vitals:    09/11/19 1629   BP: 130/93   Pulse: 87   SpO2: 96%   PainSc: 0-No pain         Constitutional - Normal - no evidence of impaired alertness, inadequate appearance, premature or advanced chronologic age, uncooperativeness, altered mood, affect or disorientation.    Problem added:  No problems updated.  Medications added: No orders of the defined types were placed in this encounter.    Ancillary referrals made:   No orders of the defined types were placed in this encounter.      Technical aspects reviewed:  Weekly OBI approved if applicable? Yes  Weekly port films approved?   Yes  Change requests noted if applicable?  Yes  Patient setup and plan reviewed?  Yes    Chart Reviewed:  Continue current treatment plan?  Yes  Treatment  "plan change requested?  No    I have reviewed and marked as \"reviewed\" the current medications, allergies and problem list in the patients EMR.    I have reviewed the patient's medical, surgical  history in detail, reviewed any pertinent lab work  and updated the computerized patient record if needed.    Patient's Care Team:  Patient Care Team:  Minesh Leone MD as PCP - General (Family Medicine)  Padmaja Ye MD as Referring Physician (Colon and Rectal Surgery)  Beatrice Lau MD as Consulting Physician (Radiation Oncology)  Dong Nguyen MD PhD as Consulting Physician (Hematology and Oncology)  Wendy Cottrell MD as Consulting Physician (Obstetrics and Gynecology)  Roland Thorpe MD as Consulting Physician (Gastroenterology)        "

## 2019-09-12 ENCOUNTER — OFFICE VISIT (OUTPATIENT)
Dept: ONCOLOGY | Facility: CLINIC | Age: 39
End: 2019-09-12

## 2019-09-12 ENCOUNTER — LAB (OUTPATIENT)
Dept: LAB | Facility: HOSPITAL | Age: 39
End: 2019-09-12

## 2019-09-12 VITALS
OXYGEN SATURATION: 96 % | SYSTOLIC BLOOD PRESSURE: 114 MMHG | WEIGHT: 225.1 LBS | BODY MASS INDEX: 37.5 KG/M2 | RESPIRATION RATE: 16 BRPM | HEART RATE: 85 BPM | DIASTOLIC BLOOD PRESSURE: 82 MMHG | HEIGHT: 65 IN | TEMPERATURE: 98.9 F

## 2019-09-12 DIAGNOSIS — C20 ADENOCARCINOMA OF RECTUM (HCC): Primary | ICD-10-CM

## 2019-09-12 DIAGNOSIS — C20 ADENOCARCINOMA OF RECTUM (HCC): ICD-10-CM

## 2019-09-12 LAB
ALBUMIN SERPL-MCNC: 3.7 G/DL (ref 3.5–5.2)
ALBUMIN/GLOB SERPL: 1.4 G/DL (ref 1.1–2.4)
ALP SERPL-CCNC: 63 U/L (ref 38–116)
ALT SERPL W P-5'-P-CCNC: 39 U/L (ref 0–33)
ANION GAP SERPL CALCULATED.3IONS-SCNC: 11.8 MMOL/L (ref 5–15)
AST SERPL-CCNC: 19 U/L (ref 0–32)
BASOPHILS # BLD AUTO: 0.03 10*3/MM3 (ref 0–0.2)
BASOPHILS NFR BLD AUTO: 0.6 % (ref 0–1.5)
BILIRUB SERPL-MCNC: 0.3 MG/DL (ref 0.2–1.2)
BUN BLD-MCNC: 8 MG/DL (ref 6–20)
BUN/CREAT SERPL: 8.9 (ref 7.3–30)
CALCIUM SPEC-SCNC: 8.9 MG/DL (ref 8.5–10.2)
CHLORIDE SERPL-SCNC: 100 MMOL/L (ref 98–107)
CO2 SERPL-SCNC: 25.2 MMOL/L (ref 22–29)
CREAT BLD-MCNC: 0.9 MG/DL (ref 0.6–1.1)
DEPRECATED RDW RBC AUTO: 49.6 FL (ref 37–54)
EOSINOPHIL # BLD AUTO: 0.38 10*3/MM3 (ref 0–0.4)
EOSINOPHIL NFR BLD AUTO: 7.2 % (ref 0.3–6.2)
ERYTHROCYTE [DISTWIDTH] IN BLOOD BY AUTOMATED COUNT: 15.7 % (ref 12.3–15.4)
GFR SERPL CREATININE-BSD FRML MDRD: 69 ML/MIN/1.73
GLOBULIN UR ELPH-MCNC: 2.7 GM/DL (ref 1.8–3.5)
GLUCOSE BLD-MCNC: 102 MG/DL (ref 74–124)
HCT VFR BLD AUTO: 43 % (ref 34–46.6)
HGB BLD-MCNC: 14.2 G/DL (ref 12–15.9)
IMM GRANULOCYTES # BLD AUTO: 0.02 10*3/MM3 (ref 0–0.05)
IMM GRANULOCYTES NFR BLD AUTO: 0.4 % (ref 0–0.5)
LYMPHOCYTES # BLD AUTO: 0.55 10*3/MM3 (ref 0.7–3.1)
LYMPHOCYTES NFR BLD AUTO: 10.4 % (ref 19.6–45.3)
MCH RBC QN AUTO: 30.5 PG (ref 26.6–33)
MCHC RBC AUTO-ENTMCNC: 33 G/DL (ref 31.5–35.7)
MCV RBC AUTO: 92.5 FL (ref 79–97)
MONOCYTES # BLD AUTO: 0.45 10*3/MM3 (ref 0.1–0.9)
MONOCYTES NFR BLD AUTO: 8.5 % (ref 5–12)
NEUTROPHILS # BLD AUTO: 3.85 10*3/MM3 (ref 1.7–7)
NEUTROPHILS NFR BLD AUTO: 72.9 % (ref 42.7–76)
NRBC BLD AUTO-RTO: 0 /100 WBC (ref 0–0.2)
PLATELET # BLD AUTO: 191 10*3/MM3 (ref 140–450)
PMV BLD AUTO: 8.2 FL (ref 6–12)
POTASSIUM BLD-SCNC: 4 MMOL/L (ref 3.5–4.7)
PROT SERPL-MCNC: 6.4 G/DL (ref 6.3–8)
RBC # BLD AUTO: 4.65 10*6/MM3 (ref 3.77–5.28)
SODIUM BLD-SCNC: 137 MMOL/L (ref 134–145)
WBC NRBC COR # BLD: 5.28 10*3/MM3 (ref 3.4–10.8)

## 2019-09-12 PROCEDURE — 99214 OFFICE O/P EST MOD 30 MIN: CPT | Performed by: NURSE PRACTITIONER

## 2019-09-12 PROCEDURE — 80053 COMPREHEN METABOLIC PANEL: CPT | Performed by: INTERNAL MEDICINE

## 2019-09-12 PROCEDURE — 77386: CPT | Performed by: RADIOLOGY

## 2019-09-12 PROCEDURE — 36415 COLL VENOUS BLD VENIPUNCTURE: CPT | Performed by: INTERNAL MEDICINE

## 2019-09-12 PROCEDURE — 77014 CHG CT GUIDANCE RADIATION THERAPY FLDS PLACEMENT: CPT | Performed by: RADIOLOGY

## 2019-09-12 PROCEDURE — 77386 CHG INTENSITY MODULATED RADIATION TX DLVR COMPLEX: CPT | Performed by: RADIOLOGY

## 2019-09-12 PROCEDURE — 85025 COMPLETE CBC W/AUTO DIFF WBC: CPT | Performed by: INTERNAL MEDICINE

## 2019-09-12 PROCEDURE — G0463 HOSPITAL OUTPT CLINIC VISIT: HCPCS | Performed by: NURSE PRACTITIONER

## 2019-09-12 NOTE — PROGRESS NOTES
REASON FOR FOLLOW UP:     1. Newly diagnosed rectal cancer, rectal ultrasound examination reported T3N0 disease without lymphadenopathy. She does have small pulmonary nodule 6-7 mm and 2 mm with indeterminate feature. There is no solid evidence of metastatic disease otherwise. Patient has stage IIA (T3N0M0) disease.  2. The patient is heterozygous factor V Leiden, currently on prophylactic anticoagulation with Lovenox 40 mg daily given her increased risk of thrombosis with MediPort and GI malignancy.   3.  The 8 mm hypermetabolic right upper lobe pulmonary nodule is suspicious for metastatic as well.    4.  Patient had a PowerPort placement on the left upper chest by Dr. Joseph on 8/9/2019.  5.  Patient was started on concurrent infusional 5-FU chemoradiation therapy on 8/12/2019, with planned complete surgical resection and further adjuvant chemotherapy with intention to cure the disease.   6. Diarrhea related to therapy and radiation.     HISTORY OF PRESENT ILLNESS:  The patient is a 39 y.o. year old female today for scheduled lab review and assessment after recent visit with JULIET De Luna.  Alma saw her on 9/9/2019 at which time she reported significant diarrhea and skin irritation of the rectum and vagina.  Reports improvement of her symptoms in the office today.  She feels that she has gotten a good schedule with her Imodium and has been able to discern specific foods that cause more diarrhea.  She has been liberally applying Aquaphor to her perineum.  She also uses unscented, dye free wipes at all times.  She has been making an effort to push fluids as much as possible.      Her CBC is stable.  Her CMP is stable.  Her potassium today is 4.0 and she has been on a daily supplement.            Recent PET imaging on 08/08/2019 reported that the 5 cm annular rectal mass is markedly intensely hypermetabolic. There is likely direct extension to the posterior wall of the uterus. The hypermetabolic pelvic  nodes are likely metastatic. The 8 mm hypermetabolic right upper lobe pulmonary nodule is likely metastatic as well. The mildly hypermetabolic neck nodes are indeterminate. The more than typical intense speckled activity throughout the bones is of uncertain etiology. No definite lesions are seen on the corresponding CT sequence. The appearance can be seen with bone marrow stimulants. The hypermetabolic foci do not correspond to the multiple sclerotic lesions within the pelvic bones. 1 of the largest measures approximately 1.8 cm at the medial roof of the left acetabulum. This sclerotic lesion and other sclerotic foci were not present on a previous pelvic CT from 08/09/2007, but there are a few tiny sclerotic foci which were present previously and are either stable or slightly larger. Other than the pelvic bones, there are otherwise no sclerotic foci within the bones.Hypermetabolic activity along the marginally thickened distal esophagus is nonspecific.    Recent CT Head and Neck on 08/21/2019 reported no evidence of abnormal enhancement to suggest metastatic disease in the head and no evidence of a dominant cervical mass or obvious pathologic adenopathy in the neck, however a 10 mm node is appreciated involving the jugulodigastric region on the left and there is a 7 mm nodule involving the right upper lobe anteriorly, nonspecific. Metastatic disease cannot be excluded.    Recent Bilateral Screening Mammogram on 08/01/2019 was benign.        Past Medical History:   Diagnosis Date   • Abnormal Pap smear of cervix 02/02/1998    JULIUS I   • Anemia in pregnancy    • Anxiety    • Cervical lymphadenitis 06/06/2012    SEEN AT [facility]   • Chronic diarrhea    • Colon cancer (CMS/HCC)     Stage III   ( STARTS RADIATION AND CHEMO ON 8-12-19 )   • Colon polyps    • Depression    • Factor V Leiden (CMS/HCC)     DX 8-2-2019   • GERD (gastroesophageal reflux disease)    • Hematochezia    • Hemorrhoids    • History of TB skin testing  2009    TB Skin Test   • HPV in female    • Infected insect bite of neck 2016    SEEN AT [facility]   • Irritable bowel syndrome    • Kidney stones 2007    SEEN AT [facility] ER   • Lumbar disc disease    • PIH (pregnancy induced hypertension)    • Rectal cancer (CMS/HCC) 2019    INVASIVE MODERATELY DIFFERENTIATED ADENOCARCINOMA   • Right leg pain    • SAB (spontaneous ) 1996     A2-1 INDUCED   • Sepsis due to cellulitis (CMS/HCC) 2002    LEFT GREAT TOE, ADMITTED TO [facility]     Past Surgical History:   Procedure Laterality Date   • CHOLECYSTECTOMY N/A 10/10/2003    LAPAROSCOPIC WITH CHOLANGIOGRAM, DR. JAMEY TALAVERA AT [facility]   • COLONOSCOPY W/ POLYPECTOMY N/A 2019    15 MM TUBULOVILLOUS ADENOMA POLYP IN HEPATIC FLEXURE, 20 MMTUBULOVILLOUS ADENOMA WITH HIGH GRADE DYSPLASIA IN RECTOSIGMOID, 4 CM MASS IN MID RECTUM, PATH: INVASIVE MODERATELY DIFFERENTIATED ADENOCARCINOMA, DR. JENNIFER LI AT [facility]   • DILATATION AND EVACUATION N/A    • ENDOSCOPY N/A 2019    LA GRADE A ESOPHAGITIS, GASTRITIS, ALL BIOPSIES BENIGN, DR. JENNIFER LI AT [facility]   • PAP SMEAR N/A 2014   • SIGMOIDOSCOPY N/A 2019    INFILTRATIVE PARTIALLY OBSTRUCTING LARGE RECTAL CANCER, AREA TATOOED, DR. GERONIMO YE AT [facility]   • TRANSRECTAL ULTRASOUND N/A 2019    Procedure: ULTRASOUND TRANSRECTAL;  Surgeon: Geronimo Ye MD;  Location: [facility] ENDOSCOPY;  Service: General   • VAGINAL DELIVERY N/A 1998   • VENOUS ACCESS DEVICE (PORT) INSERTION Left 2019    Procedure: INSERTION VENOUS ACCESS DEVICE;  Surgeon: Sj Joseph MD;  Location: [facility] OR [facility] [place];  Service: General   • WISDOM TOOTH EXTRACTION         HEMATOLOGIC/ONCOLOGIC HISTORY:      The patient reports she has intermittent rectal bleeding and urgency, mucous with her stool, starting sometime in . At that time she was referred to GI service, and was diagnosed of irritable bowel  syndrome. Nevertheless she had worsening urgency for bowel movement, and had a couple of incidents losing control of stool. She was recently seen by Roland Thorpe MD again and had colonoscopy and EGD exam on 07/08/2019. She was found having a circumferential rectal mass. According to the procedure note, the patient had a fungating circumferential bleeding 4 cm mass in the middle rectum, at distance between 13 cm and 17 cm from the anus. Mass was causing partial obstruction. There were also colon polyps found at the hepatic flexure and also at the rectosigmoid junction 23 cm from the anus. Both were resected and retrieved. EGD examination reported grade A esophagitis at the GE junction and patchy discontinuous erythema and aggravation of the mucosa without bleeding in the stomach body. There is normal mucosa of the duodenum. Pathology evaluation from this procedure reported moderately differentiated adenocarcinoma involving the rectal mass. The rectal sigmoid junction polyp was tubular/tubulovillous adenoma with high grade dysplasia negative for invasive malignancy. The hepatic flexure polyp was also tubular/tubulovillous adenoma negative for high grade dysplasia or malignancy. The biopsy from the esophagus reports squamocolumnar mucosa with inflammatory changes suggestive of mild reflux esophagitis, negative for interstitial metaplasia. Gastric biopsy was benign and duodenal biopsy was also benign. There is no mention of H-pylori.     Patient was subsequently referred to colorectal surgeon Padmaja Ye MD for further evaluation. The patient had CT scan examination for chest, abdomen and pelvis requested by Dr. Ye and were done on 07/13/2019. The study reported no evidence of lymphadenopathy in the abdomen and pelvis. There was wall thickening of the rectosigmoid junction. Normal spleen, pancreas, adrenal glands and kidneys. There was fatty liver infiltration but no focal lymphatic lesions. There was a small 6-7  mm noncalcified nodule in the right upper lobe and a tiny 3 mm subpleural nodule in the right middle lobe. No mediastinal or hilar lymphadenopathy.     Dr. Ye performed staging rectal ultrasound examination. This study reported tumor penetrating through the muscularis propria as T3 disease; there were no lymph nodes identified.      MEDICATIONS    Current Outpatient Medications:   •  clonazePAM (KLONOPIN) 0.5 MG tablet, Take 1 tablet by mouth 2 (Two) Times a Day As Needed for Seizures., Disp: 60 tablet, Rfl: 5  •  dicyclomine (BENTYL) 20 MG tablet, Take 1 tablet by mouth Every 6 (Six) Hours. (Patient taking differently: Take 20 mg by mouth As Needed.), Disp: 120 tablet, Rfl: 6  •  enoxaparin (LOVENOX) 40 MG/0.4ML solution syringe, Inject 0.4 mL under the skin into the appropriate area as directed Every 12 (Twelve) Hours., Disp: 12 mL, Rfl: 11  •  escitalopram (LEXAPRO) 20 MG tablet, Take 1 tablet by mouth Daily., Disp: 90 tablet, Rfl: 3  •  hyoscyamine (ANASPAZ,LEVSIN) 0.125 MG tablet, Take 1 tablet by mouth Every 6 (Six) Hours As Needed for Cramping., Disp: 30 tablet, Rfl: 2  •  ondansetron (ZOFRAN) 8 MG tablet, Take 1 tablet by mouth 3 (Three) Times a Day As Needed for Nausea or Vomiting., Disp: 30 tablet, Rfl: 5  •  potassium chloride (K-DUR,KLOR-CON) 20 MEQ CR tablet, Take 1 tablet by mouth Daily., Disp: 30 tablet, Rfl: 3  •  pramoxine (PROCTOFOAM) 1 % foam, Insert  into the rectum Every 2 (Two) Hours As Needed for Hemorrhoids., Disp: 15 g, Rfl: 2  •  HYDROcodone-acetaminophen (NORCO) 5-325 MG per tablet, 1-2 by mouth every four hours as needed for pain, Disp: 30 tablet, Rfl: 0  •  ondansetron (ZOFRAN) 4 MG tablet, Take 1 tablet by mouth Every 6 (Six) Hours As Needed for Nausea or Vomiting for up to 10 doses., Disp: 10 tablet, Rfl: 1    ALLERGIES:   No Known Allergies    SOCIAL HISTORY:       Social History     Socioeconomic History   • Marital status:      Spouse name: Justus   • Number of children:  1   • Years of education: Not on file   • Highest education level:  College   Occupational History     Employer: [employer]   Tobacco Use   • Smoking status: Current Every Day Smoker     Packs/day: 1.50     Years: 20.00     Pack years: 30.00     Types: Electronic Cigarette   • Smokeless tobacco: Never Used   Substance and Sexual Activity   • Alcohol use: Yes     Comment: Rarely   • Drug use: No   • Sexual activity: Defer         FAMILY HISTORY:   Mother has positive factor V Leiden mutation, although she did not have thrombosis, mother also is coronary disease with stenting, she also is occasional bruising.  Maternal grandmother had DVT, she had multiple surgical procedures.  Patient mother's half-brother had metastatic colon cancer at diagnosis in his 50s.  Maternal great aunt has breast cancer.  Patient will follow his skin cancer in his 60s, exclusive.    REVIEW OF SYSTEMS:  Review of Systems   Constitutional: Positive for unexpected weight change (See HPI ). Negative for appetite change, chills, diaphoresis, fatigue and fever.   HENT: Negative for congestion, hearing loss, mouth sores, rhinorrhea, sore throat and trouble swallowing.    Eyes: Negative for visual disturbance.   Respiratory: Negative for cough, chest tightness, shortness of breath and wheezing.    Cardiovascular: Negative for chest pain, palpitations and leg swelling.   Gastrointestinal: Positive for diarrhea (See HPI ). Negative for abdominal distention, abdominal pain, anal bleeding, constipation, nausea and vomiting.        See HPI    Endocrine: Negative for cold intolerance.   Genitourinary: Negative for dysuria, frequency, hematuria and urgency.   Musculoskeletal: Negative for arthralgias, back pain and joint swelling.        No muscle weakness.   Skin: Negative.  Negative for rash and wound.   Allergic/Immunologic: Negative for immunocompromised state.   Neurological: Negative for dizziness, seizures, syncope, speech difficulty, weakness,  numbness and headaches.   Hematological: Negative for adenopathy. Bruises/bleeds easily (see HPI ).   Psychiatric/Behavioral: Negative for agitation, behavioral problems, confusion and suicidal ideas.          There were no vitals filed for this visit.  Current Status 9/9/2019   ECOG score 0      PHYSICAL EXAM:    GENERAL:  Well-developed, well-nourished  female in no acute distress.   SKIN:  Warm, dry without rashes, purpura or petechiae.  EYES:  Pupils equal, round and reactive to light.  EOMs intact.  Conjunctivae normal.  EARS:  Hearing intact.  NOSE:  No nasal discharge.  MOUTH:  Tongue is well-papillated; no stomatitis or ulcers.  Lips normal.  THROAT:  Oropharynx without lesions or exudates.  NECK:  Supple with good range of motion; no thyromegaly or masses.  LYMPHATICS:  No cervical, supraclavicular, axillary or inguinal adenopathy.  CHEST:  Lungs clear to auscultation. Good airflow.  CARDIAC:  Regular rate and rhythm without murmurs, rubs or gallops. Normal S1,S2.  ABDOMEN:  Soft, nontender with no hepatosplenomegaly or masses. Bowel sounds normal.  EXTREMITIES:  No clubbing, cyanosis or edema.  NEUROLOGICAL:  Cranial Nerves II-XII grossly intact.  No focal neurological deficits.  PSYCHIATRIC:  Normal affect and mood.        RECENT LABS:  Lab Results   Component Value Date    WBC 5.28 09/12/2019    HGB 14.2 09/12/2019    HCT 43.0 09/12/2019    MCV 92.5 09/12/2019     09/12/2019     Lab Results   Component Value Date    NEUTROABS 3.85 09/12/2019     Lab Results   Component Value Date    CEA 18.21 08/01/2019     Glucose   Date Value Ref Range Status   09/09/2019 105 74 - 124 mg/dL Final     BUN   Date Value Ref Range Status   09/09/2019 6 6 - 20 mg/dL Final     Creatinine   Date Value Ref Range Status   09/09/2019 0.76 0.60 - 1.10 mg/dL Final     Sodium   Date Value Ref Range Status   09/09/2019 138 134 - 145 mmol/L Final     Potassium   Date Value Ref Range Status   09/09/2019 4.3 3.5 - 4.7  mmol/L Final     Chloride   Date Value Ref Range Status   09/09/2019 103 98 - 107 mmol/L Final     CO2   Date Value Ref Range Status   09/09/2019 24.8 22.0 - 29.0 mmol/L Final     Calcium   Date Value Ref Range Status   09/09/2019 8.6 8.5 - 10.2 mg/dL Final     Total Protein   Date Value Ref Range Status   09/09/2019 6.3 6.3 - 8.0 g/dL Final     Albumin   Date Value Ref Range Status   09/09/2019 3.50 3.50 - 5.20 g/dL Final     ALT (SGPT)   Date Value Ref Range Status   09/09/2019 37 (H) 0 - 33 U/L Final     AST (SGOT)   Date Value Ref Range Status   09/09/2019 23 0 - 32 U/L Final     Alkaline Phosphatase   Date Value Ref Range Status   09/09/2019 61 38 - 116 U/L Final     Total Bilirubin   Date Value Ref Range Status   09/09/2019 0.3 0.2 - 1.2 mg/dL Final     eGFR Non  Amer   Date Value Ref Range Status   09/09/2019 84 >60 mL/min/1.73 Final     BUN/Creatinine Ratio   Date Value Ref Range Status   09/09/2019 7.9 7.3 - 30.0 Final     Anion Gap   Date Value Ref Range Status   09/09/2019 10.2 5.0 - 15.0 mmol/L Final           RECENT IMAGING  CT NECK AND HEAD - 08/21/2019  CT HEAD WITH AND WITHOUT CONTRAST: The brain and ventricles are  symmetrical. There is no evidence of hemorrhage, hydrocephalus or of  abnormal extra-axial fluid. No focal area of decreased attenuation to  suggest infarction or edema is appreciated. After contrast  administration, there was no evidence of abnormal enhancement. A mucus  retention cyst involving the left maxillary sinus is noted posteriorly.     IMPRESSION:  No evidence of abnormal enhancement to suggest metastatic  disease. Further evaluation could be performed with MRI examination of  the brain which would be more sensitive in that regard.      CT NECK WITH CONTRAST:     The parotid and submandibular glands appear unremarkable. The right lobe  of the thyroid appears unremarkable. There is a 5 mm area of decreased  attenuation involving the left lobe of the thyroid gland,  nonspecific.  This may represent a small cyst. It appears unchanged versus the CT  examination of the chest performed on 07/19/2019.  Small subcentimeter nodes are identified involving the posterior  triangle of the neck bilaterally. A 10 mm node is appreciated involving  the anterior jugulodigastric region on the left. No dominant mass or  obvious pathologic adenopathy is appreciated. Mucous retention cysts  involving the left maxillary sinus are noted inferiorly.  A 7 mm nodule is appreciated involving the right upper lobe anteriorly,  present on the CT examination of the chest of 07/19/2019, unchanged.     IMPRESSION:  1. There is no evidence of a dominant cervical mass or obvious  pathologic adenopathy. A 10 mm node is appreciated involving the  jugulodigastric region on the left.  2. 7 mm nodule involving the right upper lobe anteriorly, nonspecific.  Metastatic disease cannot be excluded.         NM PET SKULL BASE TO MID THIGH - 08/08/2019  FINDINGS: The approximately 5 cm annular rectal mass is intensely hypermetabolic with a maximal SUV of 49.3. The rectal mass has linear extensions to the posterior wall of the uterus and these linear extensions appear to be hypermetabolic. There is a hypermetabolic 0.6 cm left perirectal node with a maximal SUV of 5.6. There are also tiny nodes along the right external and internal iliac chains which are hypermetabolic. There is no definite hypermetabolic lymphadenopathy within the abdomen. There is a fairly typical speckled pattern of activity throughout the liver. There is more than typical hypermetabolic activity along the distal esophagus which appears marginally thickened and the maximal SUV is 8.6. There is a solitary hypermetabolic 0.8 cm nodule within the anterior aspect of the right upper lobe with a maximal SUV of 5.1. There is no suspicious hypermetabolic activity within the mediastinum or kalina. There is a 1.3 x 0.9 cm node along the left jugular chain which has  a maximal SUV of 4.5. There is a smaller node along the right jugular chain which has a maximal SUV of 2.6. There is a speckled pattern of marrow activity diffusely and the intensity of some of the foci is more prominent than typical. The hypermetabolic focus within the left iliac body has a maximal SUV of 5.7. Foci at the sacrum have a maximal SUV of 5.9. Activity at L5 has a maximal SUV of 5.9.     IMPRESSION:  1. The approximately 5 cm annular rectal mass is markedly intensely hypermetabolic. There is likely direct extension to the posterior wall of the uterus. The hypermetabolic pelvic nodes are likely metastatic. The 8 mm hypermetabolic right upper lobe pulmonary nodule is likely metastatic as well.  2. The mildly hypermetabolic neck nodes are indeterminate. Conservative surveillance is recommended.  3. The more than typical intense speckled activity throughout the bones is of uncertain etiology. No definite lesions are seen on the corresponding CT sequence. The appearance can be seen with bone marrow stimulants. The hypermetabolic foci do not correspond to the multiple sclerotic lesions within the pelvic bones. 1 of the largest measures approximately 1.8 cm at the medial roof of the left acetabulum. This sclerotic lesion and other sclerotic foci were not present on a previous pelvic CT from 08/09/2007, but there are a few tiny sclerotic foci which  were present previously and are either stable or slightly larger. Other than the pelvic bones, there are otherwise no sclerotic foci within the bones.  4. Hypermetabolic activity along the marginally thickened distal esophagus is nonspecific. Please correlate clinically for esophagitis.    BILATERAL SCREENING MAMMOGRAM AND U/S - 08/01/2019  No mammographic evidence of malignancy.    Assessment/Plan    1.  Newly diagnosed rectal cancer, rectal ultrasound examination reported T3N0 disease without lymphadenopathy. CT scan of chest, abdomen and pelvis reported no  lymphadenopathy in the abdomen, pelvis or chest. She does have small pulmonary nodule 6-7 mm and 2 mm with indeterminate feature. There is no solid evidence of metastatic disease otherwise.   · Based on the CT scan and rectal ultrasound imaging studies, this patient had stage IIA (T3N0M0) disease.    · She had PET scan examination on 8/8/2019 which reported a hypermetabolic right upper lobe pulmonary nodule 6 mm with SUV 5.6.  This is suspicious for metastatic disease.  This patient may have stage IV disease.   · He initiated concurrent radiation with continuous 5-FU on 8/12/2019.  · Severe perennial irritation and diarrhea requiring IV supplementation and holding treatment last week.  This has improved.      2.  This patient has also strong family history for malignancy the patient had appointment with genetic counseling on September 3, 2019.    3.  Family history of positive factor V Leiden mutation with her mother, who never had thrombophilia.  This was checked prior to starting her on female hormonal supplementation.    · The patient is heterozygous factor V Leiden and therefore will require low-dose anticoagulation with Lovenox, 40 mg daily given her increased risk of thrombosis with MediPort and GI malignancy.    · She is tolerating this well with minimal bruising.  She has been rotating injection sites    · 4. Loose, frequent stools:   · She has a good schedule at this point that incorporates Imodium and omitting foods that she has identified to cause diarrhea.    5. Pelvic pain: Radiation-related. Tylenol not to exceed 3000 mg daily. The patient continues on Hyoscyamine for relief.  Stable at this point.  We will continue to monitor.    · 6. Hypokalemia: He is currently taking 20 mg of potassium daily.  Her potassium in the office is 4 today, and was 4.3 just 2 days ago.  She struggles with taking these pills and I have instructed her to go to an every other day 20 mg 1 dosing.    7. Hypomagnesemia:    · Magnesium was on 9/9/2019 was 2.1.    PLAN:  1. She will return tomorrow for scheduled unhook.  She is scheduled for 1 more week of treatment and feels strong enough to proceed with this.  2. She will continue daily Lovenox shots for prophylaxis of DVT.  3. She will continue her current regimen of antidiarrheal and diet management.  4. Continue to apply Aquaphor liberally to her Pyridium, and use dye free, fragrance free wipes with all bowel movements.  5. She will see JULIET De Luna as already scheduled on September 16, 2019 for consideration of cycle #6 of therapy  6. Our plan is for patient to undergo surgery by Dr. Ye and then proceed with further adjuvant chemotherapy.  A PET scan will be performed 5 to 6 weeks after she finishes radiation therapy to evaluate response prior to surgery    I have asked the patient to call the office with any new or worsening symptoms before her next scheduled visit.

## 2019-09-13 ENCOUNTER — INFUSION (OUTPATIENT)
Dept: ONCOLOGY | Facility: HOSPITAL | Age: 39
End: 2019-09-13

## 2019-09-13 DIAGNOSIS — Z45.2 ENCOUNTER FOR FITTING AND ADJUSTMENT OF VASCULAR CATHETER: ICD-10-CM

## 2019-09-13 DIAGNOSIS — C20 ADENOCARCINOMA OF RECTUM (HCC): Primary | ICD-10-CM

## 2019-09-13 PROCEDURE — 77014 CHG CT GUIDANCE RADIATION THERAPY FLDS PLACEMENT: CPT | Performed by: RADIOLOGY

## 2019-09-13 PROCEDURE — 77386: CPT | Performed by: RADIOLOGY

## 2019-09-13 PROCEDURE — 77386 CHG INTENSITY MODULATED RADIATION TX DLVR COMPLEX: CPT | Performed by: RADIOLOGY

## 2019-09-13 RX ORDER — SODIUM CHLORIDE 0.9 % (FLUSH) 0.9 %
10 SYRINGE (ML) INJECTION AS NEEDED
Status: DISCONTINUED | OUTPATIENT
Start: 2019-09-13 | End: 2019-09-13 | Stop reason: HOSPADM

## 2019-09-13 RX ORDER — SODIUM CHLORIDE 0.9 % (FLUSH) 0.9 %
10 SYRINGE (ML) INJECTION AS NEEDED
Status: CANCELLED | OUTPATIENT
Start: 2019-09-13

## 2019-09-13 RX ADMIN — SODIUM CHLORIDE, PRESERVATIVE FREE 10 ML: 5 INJECTION INTRAVENOUS at 13:00

## 2019-09-13 RX ADMIN — Medication 500 UNITS: at 13:00

## 2019-09-13 NOTE — PROGRESS NOTES
REASON FOR FOLLOW UP:     1. Newly diagnosed rectal cancer, rectal ultrasound examination reported T3N0 disease without lymphadenopathy. She does have small pulmonary nodule 6-7 mm and 2 mm with indeterminate feature. There is no solid evidence of metastatic disease otherwise. Patient has stage IIA (T3N0M0) disease.  2. The patient is heterozygous factor V Leiden, currently on prophylactic anticoagulation with Lovenox 40 mg daily given her increased risk of thrombosis with MediPort and GI malignancy.   3.  The 8 mm hypermetabolic right upper lobe pulmonary nodule is suspicious for metastatic as well.    4.  Patient had a PowerPort placement on the left upper chest by Dr. Joseph on 8/9/2019.  5.  Patient was started on concurrent infusional 5-FU chemoradiation therapy on 8/12/2019, with planned complete surgical resection and further adjuvant chemotherapy with intention to cure the disease.   6. Diarrhea related to therapy and radiation.     HISTORY OF PRESENT ILLNESS:  The patient is a 39 y.o. year old female here today for cycle #7 of chemoradiation with 5FU. Over the last several weeks she has struggled with progressive diarrhea and irritation to her perineum.She required additional imodium with her treatment last week and was reevaluated on Thurday, 9/10/2019 by JULIET Talamantes with improvement of her diarrhea with a more consistent imodium schedule.     She returns today quite tearful.  She is concerned that she will not get back to normal when this is all over.  She does continue with diarrhea though it is fairly well controlled during the day.  She does however have frequent bowel movements at night and does have issues of not making it to the bathroom in time.  Occasionally she has difficulties urinating, feeling as if she needs to force the process.    She is drinking well and is attempting to push fluids.  She has however had struggling with eating.  She states she may have a grilled cheese  daily.  We discussed using supplemental drinks with boost and Ensure and she does have these at home.    She continues with daily lovenox for her history of factor V leiden without excess bleeding and bruising.     She has no other concerns today.  She denies fevers.          Past Medical History:   Diagnosis Date   • Abnormal Pap smear of cervix 1998    JULIUS I   • Anemia in pregnancy    • Anxiety    • Cervical lymphadenitis 2012    SEEN AT [facility] ER   • Chronic diarrhea    • Colon cancer (CMS/HCC)     Stage III   ( STARTS RADIATION AND CHEMO ON 19 )   • Colon polyps    • Depression    • Factor V Leiden (CMS/HCC)     DX 2019   • GERD (gastroesophageal reflux disease)    • Hematochezia    • Hemorrhoids    • History of TB skin testing 2009    TB Skin Test   • HPV in female    • Infected insect bite of neck 2016    SEEN AT [facility]   • Irritable bowel syndrome    • Kidney stones 2007    SEEN AT [facility] ER   • Lumbar disc disease    • PIH (pregnancy induced hypertension)    • Rectal cancer (CMS/HCC) 2019    INVASIVE MODERATELY DIFFERENTIATED ADENOCARCINOMA   • Right leg pain    • SAB (spontaneous ) 1996     A2-1 INDUCED   • Sepsis due to cellulitis (CMS/HCC) 2002    LEFT GREAT TOE, ADMITTED TO [facility]     Past Surgical History:   Procedure Laterality Date   • CHOLECYSTECTOMY N/A 10/10/2003    LAPAROSCOPIC WITH CHOLANGIOGRAM, DR. JAMEY TALAVERA AT [facility]   • COLONOSCOPY W/ POLYPECTOMY N/A 2019    15 MM TUBULOVILLOUS ADENOMA POLYP IN HEPATIC FLEXURE, 20 MMTUBULOVILLOUS ADENOMA WITH HIGH GRADE DYSPLASIA IN RECTOSIGMOID, 4 CM MASS IN MID RECTUM, PATH: INVASIVE MODERATELY DIFFERENTIATED ADENOCARCINOMA, DR. JENNIFER LI AT [facility]   • DILATATION AND EVACUATION N/A    • ENDOSCOPY N/A 2019    LA GRADE A ESOPHAGITIS, GASTRITIS, ALL BIOPSIES BENIGN, DR. JENNIFER LI AT [facility]   • PAP SMEAR N/A 2014   • SIGMOIDOSCOPY  N/A 7/24/2019    INFILTRATIVE PARTIALLY OBSTRUCTING LARGE RECTAL CANCER, AREA TATOOED, DR. PADMAJA ESPARZA AT [facility]   • TRANSRECTAL ULTRASOUND N/A 7/24/2019    Procedure: ULTRASOUND TRANSRECTAL;  Surgeon: Padmaja Esparza MD;  Location: [facility] ENDOSCOPY;  Service: General   • VAGINAL DELIVERY N/A 09/18/1998   • VENOUS ACCESS DEVICE (PORT) INSERTION Left 8/9/2019    Procedure: INSERTION VENOUS ACCESS DEVICE;  Surgeon: Sj Joseph MD;  Location: [facility] OR [facility];  Service: General   • WISDOM TOOTH EXTRACTION  1993       HEMATOLOGIC/ONCOLOGIC HISTORY:      The patient reports she has intermittent rectal bleeding and urgency, mucous with her stool, starting sometime in 2016. At that time she was referred to GI service, and was diagnosed of irritable bowel syndrome. Nevertheless she had worsening urgency for bowel movement, and had a couple of incidents losing control of stool. She was recently seen by Roland Thorpe MD again and had colonoscopy and EGD exam on 07/08/2019. She was found having a circumferential rectal mass. According to the procedure note, the patient had a fungating circumferential bleeding 4 cm mass in the middle rectum, at distance between 13 cm and 17 cm from the anus. Mass was causing partial obstruction. There were also colon polyps found at the hepatic flexure and also at the rectosigmoid junction 23 cm from the anus. Both were resected and retrieved. EGD examination reported grade A esophagitis at the GE junction and patchy discontinuous erythema and aggravation of the mucosa without bleeding in the stomach body. There is normal mucosa of the duodenum. Pathology evaluation from this procedure reported moderately differentiated adenocarcinoma involving the rectal mass. The rectal sigmoid junction polyp was tubular/tubulovillous adenoma with high grade dysplasia negative for invasive malignancy. The hepatic flexure polyp was also tubular/tubulovillous adenoma negative for high grade dysplasia or  malignancy. The biopsy from the esophagus reports squamocolumnar mucosa with inflammatory changes suggestive of mild reflux esophagitis, negative for interstitial metaplasia. Gastric biopsy was benign and duodenal biopsy was also benign. There is no mention of H-pylori.     Patient was subsequently referred to colorectal surgeon Padmaja Ye MD for further evaluation. The patient had CT scan examination for chest, abdomen and pelvis requested by Dr. Ye and were done on 07/13/2019. The study reported no evidence of lymphadenopathy in the abdomen and pelvis. There was wall thickening of the rectosigmoid junction. Normal spleen, pancreas, adrenal glands and kidneys. There was fatty liver infiltration but no focal lymphatic lesions. There was a small 6-7 mm noncalcified nodule in the right upper lobe and a tiny 3 mm subpleural nodule in the right middle lobe. No mediastinal or hilar lymphadenopathy.     Dr. Ye performed staging rectal ultrasound examination. This study reported tumor penetrating through the muscularis propria as T3 disease; there were no lymph nodes identified.      MEDICATIONS    Current Outpatient Medications:   •  clonazePAM (KLONOPIN) 0.5 MG tablet, Take 1 tablet by mouth 2 (Two) Times a Day As Needed for Seizures., Disp: 60 tablet, Rfl: 5  •  dicyclomine (BENTYL) 20 MG tablet, Take 1 tablet by mouth Every 6 (Six) Hours. (Patient taking differently: Take 20 mg by mouth As Needed.), Disp: 120 tablet, Rfl: 6  •  enoxaparin (LOVENOX) 40 MG/0.4ML solution syringe, Inject 0.4 mL under the skin into the appropriate area as directed Every 12 (Twelve) Hours., Disp: 12 mL, Rfl: 11  •  escitalopram (LEXAPRO) 20 MG tablet, Take 1 tablet by mouth Daily., Disp: 90 tablet, Rfl: 3  •  HYDROcodone-acetaminophen (NORCO) 5-325 MG per tablet, 1-2 by mouth every four hours as needed for pain, Disp: 30 tablet, Rfl: 0  •  hyoscyamine (ANASPAZ,LEVSIN) 0.125 MG tablet, Take 1 tablet by mouth Every 6 (Six) Hours As  Needed for Cramping., Disp: 30 tablet, Rfl: 2  •  ondansetron (ZOFRAN) 8 MG tablet, Take 1 tablet by mouth 3 (Three) Times a Day As Needed for Nausea or Vomiting., Disp: 30 tablet, Rfl: 5  •  potassium chloride (K-DUR,KLOR-CON) 20 MEQ CR tablet, Take 1 tablet by mouth Daily., Disp: 30 tablet, Rfl: 3  •  pramoxine (PROCTOFOAM) 1 % foam, Insert  into the rectum Every 2 (Two) Hours As Needed for Hemorrhoids., Disp: 15 g, Rfl: 2  •  famotidine (PEPCID) 20 MG tablet, Take 1 tablet by mouth 2 (Two) Times a Day., Disp: 60 tablet, Rfl: 0  No current facility-administered medications for this visit.     Facility-Administered Medications Ordered in Other Visits:   •  fluorouracil (ADRUCIL) 1,940 mg, sodium chloride 0.9 % 163.2 mL chemo infusion - FOR HOME USE, 900 mg/m2 (Treatment Plan Recorded), Intravenous, Once, Katiuska Moseley APRN, 1,940 mg at 09/16/19 1159    ALLERGIES:   No Known Allergies    SOCIAL HISTORY:       Social History     Socioeconomic History   • Marital status:      Spouse name: Justus   • Number of children: 1   • Years of education: Not on file   • Highest education level:  College   Occupational History     Employer: [employer]   Tobacco Use   • Smoking status: Current Every Day Smoker     Packs/day: 1.50     Years: 20.00     Pack years: 30.00     Types: Electronic Cigarette   • Smokeless tobacco: Never Used   Substance and Sexual Activity   • Alcohol use: Yes     Comment: Rarely   • Drug use: No   • Sexual activity: Defer         FAMILY HISTORY:   Mother has positive factor V Leiden mutation, although she did not have thrombosis, mother also is coronary disease with stenting, she also is occasional bruising.  Maternal grandmother had DVT, she had multiple surgical procedures.  Patient mother's half-brother had metastatic colon cancer at diagnosis in his 50s.  Maternal great aunt has breast cancer.  Patient will follow his skin cancer in his 60s, exclusive.    REVIEW OF SYSTEMS:  Review of  "Systems   Constitutional: Positive for unexpected weight change (See HPI ). Negative for appetite change, chills, diaphoresis, fatigue and fever.   HENT: Negative for congestion, hearing loss, mouth sores, rhinorrhea, sore throat and trouble swallowing.    Eyes: Negative for visual disturbance.   Respiratory: Negative for cough, chest tightness, shortness of breath and wheezing.    Cardiovascular: Negative for chest pain, palpitations and leg swelling.   Gastrointestinal: Positive for diarrhea (See HPI ) and nausea. Negative for abdominal distention, abdominal pain, anal bleeding, constipation and vomiting.        See HPI    Endocrine: Negative for cold intolerance.   Genitourinary: Negative for dysuria, frequency, hematuria and urgency.   Musculoskeletal: Negative for arthralgias, back pain and joint swelling.   Skin: Negative.  Negative for rash and wound.   Allergic/Immunologic: Negative for immunocompromised state.   Neurological: Negative for dizziness, seizures, syncope, speech difficulty, weakness, numbness and headaches.   Hematological: Negative for adenopathy. Bruises/bleeds easily (see HPI ).   Psychiatric/Behavioral: Negative for agitation, behavioral problems, confusion and suicidal ideas.          Vitals:    09/16/19 1004   BP: 124/87   Pulse: 106   Resp: 16   Temp: 98.1 °F (36.7 °C)   TempSrc: Oral   SpO2: 95%   Weight: 98.8 kg (217 lb 14.4 oz)   Height: 164.5 cm (64.76\")   PainSc: 0-No pain     Current Status 9/16/2019   ECOG score 0      PHYSICAL EXAM:    GENERAL:  Well-developed, well-nourished  female in no acute distress.   SKIN:  Warm, dry without rashes, purpura or petechiae.  EYES:  Pupils equal, round and reactive to light.  EOMs intact.  Conjunctivae normal.  EARS:  Hearing intact.  NOSE:  No nasal discharge.  MOUTH:  Tongue is well-papillated; no stomatitis or ulcers.  Lips normal.  THROAT:  Oropharynx without lesions or exudates.  NECK:  Supple with good range of motion; no " thyromegaly or masses.  LYMPHATICS:  No cervical, supraclavicular adenopathy.  CHEST:  Lungs clear to auscultation. Good airflow.  CARDIAC:  Regular rate and rhythm without murmurs, rubs or gallops. Normal S1,S2.  ABDOMEN:  Soft, nontender with no hepatosplenomegaly or masses. Bowel sounds normal.  EXTREMITIES:  No clubbing, cyanosis or edema.  NEUROLOGICAL:  Cranial Nerves II-XII grossly intact.  No focal neurological deficits.  PSYCHIATRIC:  Normal affect and mood.        RECENT LABS:  Lab Results   Component Value Date    WBC 6.16 09/16/2019    HGB 15.8 09/16/2019    HCT 46.2 09/16/2019    MCV 88.8 09/16/2019     09/16/2019     Lab Results   Component Value Date    NEUTROABS 4.50 09/16/2019     Lab Results   Component Value Date    CEA 18.21 08/01/2019     Glucose   Date Value Ref Range Status   09/16/2019 117 74 - 124 mg/dL Final     BUN   Date Value Ref Range Status   09/16/2019 7 6 - 20 mg/dL Final     Creatinine   Date Value Ref Range Status   09/16/2019 0.86 0.60 - 1.10 mg/dL Final     Sodium   Date Value Ref Range Status   09/16/2019 139 134 - 145 mmol/L Final     Potassium   Date Value Ref Range Status   09/16/2019 4.0 3.5 - 4.7 mmol/L Final     Chloride   Date Value Ref Range Status   09/16/2019 102 98 - 107 mmol/L Final     CO2   Date Value Ref Range Status   09/16/2019 24.6 22.0 - 29.0 mmol/L Final     Calcium   Date Value Ref Range Status   09/16/2019 8.7 8.5 - 10.2 mg/dL Final     Total Protein   Date Value Ref Range Status   09/16/2019 6.4 6.3 - 8.0 g/dL Final     Albumin   Date Value Ref Range Status   09/16/2019 3.50 3.50 - 5.20 g/dL Final     ALT (SGPT)   Date Value Ref Range Status   09/16/2019 50 (H) 0 - 33 U/L Final     AST (SGOT)   Date Value Ref Range Status   09/16/2019 27 0 - 32 U/L Final     Alkaline Phosphatase   Date Value Ref Range Status   09/16/2019 66 38 - 116 U/L Final     Total Bilirubin   Date Value Ref Range Status   09/16/2019 0.3 0.2 - 1.2 mg/dL Final     eGFR Non   Amer   Date Value Ref Range Status   09/16/2019 73 >60 mL/min/1.73 Final     BUN/Creatinine Ratio   Date Value Ref Range Status   09/16/2019 8.1 7.3 - 30.0 Final     Anion Gap   Date Value Ref Range Status   09/16/2019 12.4 5.0 - 15.0 mmol/L Final           RECENT IMAGING  CT NECK AND HEAD - 08/21/2019  CT HEAD WITH AND WITHOUT CONTRAST: The brain and ventricles are  symmetrical. There is no evidence of hemorrhage, hydrocephalus or of  abnormal extra-axial fluid. No focal area of decreased attenuation to  suggest infarction or edema is appreciated. After contrast  administration, there was no evidence of abnormal enhancement. A mucus  retention cyst involving the left maxillary sinus is noted posteriorly.     IMPRESSION:  No evidence of abnormal enhancement to suggest metastatic  disease. Further evaluation could be performed with MRI examination of  the brain which would be more sensitive in that regard.      CT NECK WITH CONTRAST:     The parotid and submandibular glands appear unremarkable. The right lobe  of the thyroid appears unremarkable. There is a 5 mm area of decreased  attenuation involving the left lobe of the thyroid gland, nonspecific.  This may represent a small cyst. It appears unchanged versus the CT  examination of the chest performed on 07/19/2019.  Small subcentimeter nodes are identified involving the posterior  triangle of the neck bilaterally. A 10 mm node is appreciated involving  the anterior jugulodigastric region on the left. No dominant mass or  obvious pathologic adenopathy is appreciated. Mucous retention cysts  involving the left maxillary sinus are noted inferiorly.  A 7 mm nodule is appreciated involving the right upper lobe anteriorly,  present on the CT examination of the chest of 07/19/2019, unchanged.     IMPRESSION:  1. There is no evidence of a dominant cervical mass or obvious  pathologic adenopathy. A 10 mm node is appreciated involving the  jugulodigastric region on  the left.  2. 7 mm nodule involving the right upper lobe anteriorly, nonspecific.  Metastatic disease cannot be excluded.         NM PET SKULL BASE TO MID THIGH - 08/08/2019  FINDINGS: The approximately 5 cm annular rectal mass is intensely hypermetabolic with a maximal SUV of 49.3. The rectal mass has linear extensions to the posterior wall of the uterus and these linear extensions appear to be hypermetabolic. There is a hypermetabolic 0.6 cm left perirectal node with a maximal SUV of 5.6. There are also tiny nodes along the right external and internal iliac chains which are hypermetabolic. There is no definite hypermetabolic lymphadenopathy within the abdomen. There is a fairly typical speckled pattern of activity throughout the liver. There is more than typical hypermetabolic activity along the distal esophagus which appears marginally thickened and the maximal SUV is 8.6. There is a solitary hypermetabolic 0.8 cm nodule within the anterior aspect of the right upper lobe with a maximal SUV of 5.1. There is no suspicious hypermetabolic activity within the mediastinum or kalina. There is a 1.3 x 0.9 cm node along the left jugular chain which has a maximal SUV of 4.5. There is a smaller node along the right jugular chain which has a maximal SUV of 2.6. There is a speckled pattern of marrow activity diffusely and the intensity of some of the foci is more prominent than typical. The hypermetabolic focus within the left iliac body has a maximal SUV of 5.7. Foci at the sacrum have a maximal SUV of 5.9. Activity at L5 has a maximal SUV of 5.9.     IMPRESSION:  1. The approximately 5 cm annular rectal mass is markedly intensely hypermetabolic. There is likely direct extension to the posterior wall of the uterus. The hypermetabolic pelvic nodes are likely metastatic. The 8 mm hypermetabolic right upper lobe pulmonary nodule is likely metastatic as well.  2. The mildly hypermetabolic neck nodes are indeterminate. Conservative  surveillance is recommended.  3. The more than typical intense speckled activity throughout the bones is of uncertain etiology. No definite lesions are seen on the corresponding CT sequence. The appearance can be seen with bone marrow stimulants. The hypermetabolic foci do not correspond to the multiple sclerotic lesions within the pelvic bones. 1 of the largest measures approximately 1.8 cm at the medial roof of the left acetabulum. This sclerotic lesion and other sclerotic foci were not present on a previous pelvic CT from 08/09/2007, but there are a few tiny sclerotic foci which  were present previously and are either stable or slightly larger. Other than the pelvic bones, there are otherwise no sclerotic foci within the bones.  4. Hypermetabolic activity along the marginally thickened distal esophagus is nonspecific. Please correlate clinically for esophagitis.    BILATERAL SCREENING MAMMOGRAM AND U/S - 08/01/2019  No mammographic evidence of malignancy.    Assessment/Plan    1.  Newly diagnosed rectal cancer, rectal ultrasound examination reported T3N0 disease without lymphadenopathy. CT scan of chest, abdomen and pelvis reported no lymphadenopathy in the abdomen, pelvis or chest. She does have small pulmonary nodule 6-7 mm and 2 mm with indeterminate feature. There is no solid evidence of metastatic disease otherwise.   · Based on the CT scan and rectal ultrasound imaging studies, this patient had stage IIA (T3N0M0) disease.    · She had PET scan examination on 8/8/2019 which reported a hypermetabolic right upper lobe pulmonary nodule 6 mm with SUV 5.6.  This is suspicious for metastatic disease.  This patient may have stage IV disease.   · He initiated concurrent radiation with continuous 5-FU on 8/12/2019.  · Patient is here today for her 6th and final cycle of chemoradiation. Her diarrhea is reasonably controlled during the day however not controlled at night.  She has lost further weight and is admittedly  not eating much throughout the day due to concerns of it causing diarrhea.  She is drinking plenty of fluids she reports.      2.  This patient has also strong family history for malignancy the patient had appointment with genetic counseling on September 3, 2019.    3.  Family history of positive factor V Leiden mutation with her mother, who never had thrombophilia.  This was checked prior to starting her on female hormonal supplementation.    · The patient is heterozygous factor V Leiden and therefore will require low-dose anticoagulation with Lovenox, 40 mg daily given her increased risk of thrombosis with MediPort and GI malignancy.    · She continues to tolerate Lovenox well with minimal bruising. She does rotate injection sites     4. Loose, frequent stools:   · Patient now has adequate management of diarrhea during the day and avoids foods that may cause exacerbations.  At night, she has a harder time controlling her bowels and does get up multiple times for a bowel movement.  She has sometimes when she does not make it to the bathroom in time.    5. Pelvic pain: Radiation-related. Tylenol not to exceed 3000 mg daily. The patient continues on Hyoscyamine for relief.  Stable at this point.  We will continue to monitor.    6. Hypokalemia: SHe is currently taking 20 mg of potassium every other day.  Her potassium in the office is 4 today.     7. Hypomagnesemia:   · Magnesium today is pending.    8.  Occasional difficulty initiating urination:  Most often she has no problems with sometimes the pain causes her to have difficulty initiating urination.  She will trial warm water bottle on the perineum to see if this helps relax the muscles.    9. Nausea: This does seem to be multifactorial with a large part due to anxiety.  The patient does continue on Lexapro at 20 mg daily as well as Klonopin 0.5 mg once daily.  She is able to take this up to 2 times daily as needed and will try this over this next week.  She also  reports reflux type symptoms describing a feeling like she is going to vomit frequently.  This is worse at night when she lies down.  We discussed using Pepcid 20 mg twice daily which she will trial.  She also use her ondansetron if needed.    PLAN:  1. She will proceed with week 6 of chemoradiation today   2. She will continue daily Lovenox shots for prophylaxis of DVT.  3. She will continue her current regimen of antidiarrheal and increase her nutrition using protein shakes.  4. Continue to apply Aquaphor liberally to her Pperineum, and use dye free, fragrance free wipes with all bowel movements.  5. Pepcid 20 mg twice daily.  6. She will return on 9/23/2019 for MD follow up with Dr. Nguyen  7. Our plan is for patient to undergo surgery by Dr. Ye and then proceed with further adjuvant chemotherapy.  A PET scan will be performed 5 to 6 weeks after she finishes radiation therapy to evaluate response prior to surgery  8. I have asked her to call our office with any concerns prior to her next office visit. She has v/u

## 2019-09-16 ENCOUNTER — LAB (OUTPATIENT)
Dept: LAB | Facility: HOSPITAL | Age: 39
End: 2019-09-16

## 2019-09-16 ENCOUNTER — OFFICE VISIT (OUTPATIENT)
Dept: ONCOLOGY | Facility: CLINIC | Age: 39
End: 2019-09-16

## 2019-09-16 ENCOUNTER — INFUSION (OUTPATIENT)
Dept: ONCOLOGY | Facility: HOSPITAL | Age: 39
End: 2019-09-16

## 2019-09-16 VITALS
DIASTOLIC BLOOD PRESSURE: 87 MMHG | HEART RATE: 106 BPM | BODY MASS INDEX: 36.3 KG/M2 | TEMPERATURE: 98.1 F | RESPIRATION RATE: 16 BRPM | HEIGHT: 65 IN | OXYGEN SATURATION: 95 % | SYSTOLIC BLOOD PRESSURE: 124 MMHG | WEIGHT: 217.9 LBS

## 2019-09-16 DIAGNOSIS — E83.42 HYPOMAGNESEMIA: ICD-10-CM

## 2019-09-16 DIAGNOSIS — C20 ADENOCARCINOMA OF RECTUM (HCC): ICD-10-CM

## 2019-09-16 DIAGNOSIS — C20 ADENOCARCINOMA OF RECTUM (HCC): Primary | ICD-10-CM

## 2019-09-16 DIAGNOSIS — K59.1 FUNCTIONAL DIARRHEA: Primary | ICD-10-CM

## 2019-09-16 LAB
ALBUMIN SERPL-MCNC: 3.5 G/DL (ref 3.5–5.2)
ALBUMIN/GLOB SERPL: 1.2 G/DL (ref 1.1–2.4)
ALP SERPL-CCNC: 66 U/L (ref 38–116)
ALT SERPL W P-5'-P-CCNC: 50 U/L (ref 0–33)
ANION GAP SERPL CALCULATED.3IONS-SCNC: 12.4 MMOL/L (ref 5–15)
AST SERPL-CCNC: 27 U/L (ref 0–32)
BASOPHILS # BLD AUTO: 0.02 10*3/MM3 (ref 0–0.2)
BASOPHILS NFR BLD AUTO: 0.3 % (ref 0–1.5)
BILIRUB SERPL-MCNC: 0.3 MG/DL (ref 0.2–1.2)
BUN BLD-MCNC: 7 MG/DL (ref 6–20)
BUN/CREAT SERPL: 8.1 (ref 7.3–30)
CALCIUM SPEC-SCNC: 8.7 MG/DL (ref 8.5–10.2)
CHLORIDE SERPL-SCNC: 102 MMOL/L (ref 98–107)
CO2 SERPL-SCNC: 24.6 MMOL/L (ref 22–29)
CREAT BLD-MCNC: 0.86 MG/DL (ref 0.6–1.1)
DEPRECATED RDW RBC AUTO: 47.5 FL (ref 37–54)
EOSINOPHIL # BLD AUTO: 0.68 10*3/MM3 (ref 0–0.4)
EOSINOPHIL NFR BLD AUTO: 11 % (ref 0.3–6.2)
ERYTHROCYTE [DISTWIDTH] IN BLOOD BY AUTOMATED COUNT: 16.2 % (ref 12.3–15.4)
GFR SERPL CREATININE-BSD FRML MDRD: 73 ML/MIN/1.73
GLOBULIN UR ELPH-MCNC: 2.9 GM/DL (ref 1.8–3.5)
GLUCOSE BLD-MCNC: 117 MG/DL (ref 74–124)
HCT VFR BLD AUTO: 46.2 % (ref 34–46.6)
HGB BLD-MCNC: 15.8 G/DL (ref 12–15.9)
IMM GRANULOCYTES # BLD AUTO: 0.03 10*3/MM3 (ref 0–0.05)
IMM GRANULOCYTES NFR BLD AUTO: 0.5 % (ref 0–0.5)
LYMPHOCYTES # BLD AUTO: 0.37 10*3/MM3 (ref 0.7–3.1)
LYMPHOCYTES NFR BLD AUTO: 6 % (ref 19.6–45.3)
MAGNESIUM SERPL-MCNC: 2 MG/DL (ref 1.8–2.5)
MCH RBC QN AUTO: 30.4 PG (ref 26.6–33)
MCHC RBC AUTO-ENTMCNC: 34.2 G/DL (ref 31.5–35.7)
MCV RBC AUTO: 88.8 FL (ref 79–97)
MONOCYTES # BLD AUTO: 0.56 10*3/MM3 (ref 0.1–0.9)
MONOCYTES NFR BLD AUTO: 9.1 % (ref 5–12)
NEUTROPHILS # BLD AUTO: 4.5 10*3/MM3 (ref 1.7–7)
NEUTROPHILS NFR BLD AUTO: 73.1 % (ref 42.7–76)
NRBC BLD AUTO-RTO: 0 /100 WBC (ref 0–0.2)
PLATELET # BLD AUTO: 210 10*3/MM3 (ref 140–450)
PMV BLD AUTO: 9 FL (ref 6–12)
POTASSIUM BLD-SCNC: 4 MMOL/L (ref 3.5–4.7)
PROT SERPL-MCNC: 6.4 G/DL (ref 6.3–8)
RBC # BLD AUTO: 5.2 10*6/MM3 (ref 3.77–5.28)
SODIUM BLD-SCNC: 139 MMOL/L (ref 134–145)
WBC NRBC COR # BLD: 6.16 10*3/MM3 (ref 3.4–10.8)

## 2019-09-16 PROCEDURE — 77386: CPT | Performed by: RADIOLOGY

## 2019-09-16 PROCEDURE — 25010000002 ALTEPLASE 2 MG RECONSTITUTED SOLUTION: Performed by: NURSE PRACTITIONER

## 2019-09-16 PROCEDURE — 85025 COMPLETE CBC W/AUTO DIFF WBC: CPT | Performed by: NURSE PRACTITIONER

## 2019-09-16 PROCEDURE — 77014 CHG CT GUIDANCE RADIATION THERAPY FLDS PLACEMENT: CPT | Performed by: RADIOLOGY

## 2019-09-16 PROCEDURE — 80053 COMPREHEN METABOLIC PANEL: CPT | Performed by: NURSE PRACTITIONER

## 2019-09-16 PROCEDURE — 96416 CHEMO PROLONG INFUSE W/PUMP: CPT | Performed by: NURSE PRACTITIONER

## 2019-09-16 PROCEDURE — 36415 COLL VENOUS BLD VENIPUNCTURE: CPT | Performed by: NURSE PRACTITIONER

## 2019-09-16 PROCEDURE — 77386 CHG INTENSITY MODULATED RADIATION TX DLVR COMPLEX: CPT | Performed by: RADIOLOGY

## 2019-09-16 PROCEDURE — 99214 OFFICE O/P EST MOD 30 MIN: CPT | Performed by: NURSE PRACTITIONER

## 2019-09-16 PROCEDURE — 83735 ASSAY OF MAGNESIUM: CPT | Performed by: NURSE PRACTITIONER

## 2019-09-16 PROCEDURE — 36416 COLLJ CAPILLARY BLOOD SPEC: CPT | Performed by: NURSE PRACTITIONER

## 2019-09-16 PROCEDURE — 77336 RADIATION PHYSICS CONSULT: CPT | Performed by: RADIOLOGY

## 2019-09-16 PROCEDURE — 25010000002 FLUOROURACIL PER 500 MG: Performed by: NURSE PRACTITIONER

## 2019-09-16 PROCEDURE — 77338 DESIGN MLC DEVICE FOR IMRT: CPT | Performed by: RADIOLOGY

## 2019-09-16 RX ORDER — FAMOTIDINE 20 MG/1
20 TABLET, FILM COATED ORAL 2 TIMES DAILY
Qty: 60 TABLET | Refills: 0 | Status: SHIPPED | OUTPATIENT
Start: 2019-09-16 | End: 2019-10-09 | Stop reason: SDUPTHER

## 2019-09-16 RX ADMIN — ALTEPLASE: 2.2 INJECTION, POWDER, LYOPHILIZED, FOR SOLUTION INTRAVENOUS at 11:02

## 2019-09-16 RX ADMIN — FLUOROURACIL 1940 MG: 50 INJECTION, SOLUTION INTRAVENOUS at 11:59

## 2019-09-16 NOTE — PROGRESS NOTES
Cath-annabel instilled at 1102.  1132:  No blood return noted.  Will continue to monitor.  1155:  Able to obtain blood return after Cath-annabel removed and pt lying flat with her left arm extended.  Pt hooked up to chemo ball as ordered.

## 2019-09-17 PROCEDURE — 77300 RADIATION THERAPY DOSE PLAN: CPT | Performed by: RADIOLOGY

## 2019-09-17 PROCEDURE — 77386: CPT | Performed by: RADIOLOGY

## 2019-09-17 PROCEDURE — 77386 CHG INTENSITY MODULATED RADIATION TX DLVR COMPLEX: CPT | Performed by: RADIOLOGY

## 2019-09-17 PROCEDURE — 77014 CHG CT GUIDANCE RADIATION THERAPY FLDS PLACEMENT: CPT | Performed by: RADIOLOGY

## 2019-09-18 PROCEDURE — 77386 CHG INTENSITY MODULATED RADIATION TX DLVR COMPLEX: CPT | Performed by: RADIOLOGY

## 2019-09-18 PROCEDURE — 77386: CPT | Performed by: RADIOLOGY

## 2019-09-18 PROCEDURE — 77014 CHG CT GUIDANCE RADIATION THERAPY FLDS PLACEMENT: CPT | Performed by: RADIOLOGY

## 2019-09-19 ENCOUNTER — RADIATION ONCOLOGY WEEKLY ASSESSMENT (OUTPATIENT)
Dept: RADIATION ONCOLOGY | Facility: HOSPITAL | Age: 39
End: 2019-09-19

## 2019-09-19 VITALS
OXYGEN SATURATION: 96 % | WEIGHT: 216 LBS | DIASTOLIC BLOOD PRESSURE: 94 MMHG | HEART RATE: 91 BPM | SYSTOLIC BLOOD PRESSURE: 130 MMHG | BODY MASS INDEX: 36.21 KG/M2

## 2019-09-19 DIAGNOSIS — C20 ADENOCARCINOMA OF RECTUM (HCC): Primary | ICD-10-CM

## 2019-09-19 LAB
BACTERIA UR QL AUTO: ABNORMAL /HPF
BILIRUB UR QL STRIP: NEGATIVE
CLARITY UR: ABNORMAL
COLOR UR: ABNORMAL
GLUCOSE UR STRIP-MCNC: NEGATIVE MG/DL
HGB UR QL STRIP.AUTO: ABNORMAL
HYALINE CASTS UR QL AUTO: ABNORMAL /LPF
KETONES UR QL STRIP: NEGATIVE
LEUKOCYTE ESTERASE UR QL STRIP.AUTO: ABNORMAL
NITRITE UR QL STRIP: POSITIVE
PH UR STRIP.AUTO: 6 [PH] (ref 5–8)
PROT UR QL STRIP: ABNORMAL
RBC # UR: ABNORMAL /HPF
REF LAB TEST METHOD: ABNORMAL
SP GR UR STRIP: >=1.03 (ref 1–1.03)
SQUAMOUS #/AREA URNS HPF: ABNORMAL /HPF
UROBILINOGEN UR QL STRIP: ABNORMAL
WBC UR QL AUTO: ABNORMAL /HPF

## 2019-09-19 PROCEDURE — 77386: CPT | Performed by: RADIOLOGY

## 2019-09-19 PROCEDURE — 77386 CHG INTENSITY MODULATED RADIATION TX DLVR COMPLEX: CPT | Performed by: RADIOLOGY

## 2019-09-19 PROCEDURE — 77014 CHG CT GUIDANCE RADIATION THERAPY FLDS PLACEMENT: CPT | Performed by: RADIOLOGY

## 2019-09-19 PROCEDURE — 77336 RADIATION PHYSICS CONSULT: CPT | Performed by: RADIOLOGY

## 2019-09-19 PROCEDURE — 87086 URINE CULTURE/COLONY COUNT: CPT | Performed by: RADIOLOGY

## 2019-09-19 PROCEDURE — 81001 URINALYSIS AUTO W/SCOPE: CPT | Performed by: RADIOLOGY

## 2019-09-19 RX ORDER — SULFAMETHOXAZOLE AND TRIMETHOPRIM 800; 160 MG/1; MG/1
1 TABLET ORAL 2 TIMES DAILY
Qty: 14 TABLET | Refills: 0 | Status: SHIPPED | OUTPATIENT
Start: 2019-09-19 | End: 2019-09-26

## 2019-09-19 NOTE — PROGRESS NOTES
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 Diarrhea controlled now. Increased urinary symptoms as expected. Will check UA today. Mild desquam at gluteal crease. C/O pain in neck since this morning - nothing on exam; muscle tension. Port looks fine. Gave trapezius myalgia exercises to try and if worsens/persists, call. Also had episode of abdominal cramping last night - she thought kidney stone but it resolved. Encouraged her to watch, use antispasmodic meds she has and watch. Call if persists or recurs. F/U discussed - will see back in about 4 weeks with scans, re-eval with Dr. Ye thereafter. Sees the [facility] physicians early next week as well.    Performance Status:  (1) Restricted in physically strenuous activity, ambulatory and able to do work of light nature    ROS - Other than as listed above, as follows:  Constitutional - Normal - no complaints of fatigue, denies lack of appetite, fever, night sweats or change in weight.  Cardiovascular - Normal - denies arrhythmias, chest pain, dyspnea, edema, orthopnea or palpitations.  Respiratory - Normal - denies cough, dyspnea, hemoptysis, hiccoughs, pleuritic chest pain or wheezing.  Gastrointestinal - Normal - no complaints of constipation, abdominal pain, diarrhea, heartburn/dyspepsia, hematemesis, hemorrhoids, melena or GI bleeding, nausea, pain or cramping or vomiting.    PHYSICAL EXAM - Other than as listed above, as follows:  Vitals:    09/19/19 1458   BP: 130/94   Pulse: 91   SpO2: 96%   Weight: 98 kg (216 lb)   PainSc: 0-No pain         Constitutional - Normal - no evidence of impaired alertness, inadequate appearance, premature or advanced chronologic age, uncooperativeness, altered mood, affect or disorientation.    Problem added:   "No problems updated.  Medications added: No orders of the defined types were placed in this encounter.    Ancillary referrals made: No orders of the defined types were placed in this encounter.      Technical aspects reviewed:  Weekly OBI approved if applicable? Yes  Weekly port films approved?   Yes  Change requests noted if applicable?  Yes  Patient setup and plan reviewed?  Yes    Chart Reviewed:  Continue current treatment plan?  Yes  Treatment plan change requested?  No    I have reviewed and marked as \"reviewed\" the current medications, allergies and problem list in the patients EMR.    I have reviewed the patient's medical, surgical  history in detail, reviewed any pertinent lab work  and updated the computerized patient record if needed.    Patient's Care Team:  Patient Care Team:  Minesh Leone MD as PCP - General (Family Medicine)  Padmaja Ye MD as Referring Physician (Colon and Rectal Surgery)  Beatrice Lau MD as Consulting Physician (Radiation Oncology)  Dong Nguyen MD PhD as Consulting Physician (Hematology and Oncology)  Wendy Cottrell MD as Consulting Physician (Obstetrics and Gynecology)  Roland Thorpe MD as Consulting Physician (Gastroenterology)        "

## 2019-09-20 ENCOUNTER — APPOINTMENT (OUTPATIENT)
Dept: CT IMAGING | Facility: HOSPITAL | Age: 39
End: 2019-09-20

## 2019-09-20 ENCOUNTER — DOCUMENTATION (OUTPATIENT)
Dept: RADIATION ONCOLOGY | Facility: HOSPITAL | Age: 39
End: 2019-09-20

## 2019-09-20 ENCOUNTER — INFUSION (OUTPATIENT)
Dept: ONCOLOGY | Facility: HOSPITAL | Age: 39
End: 2019-09-20

## 2019-09-20 ENCOUNTER — HOSPITAL ENCOUNTER (INPATIENT)
Facility: HOSPITAL | Age: 39
LOS: 4 days | Discharge: HOME | End: 2019-09-24
Attending: EMERGENCY MEDICINE | Admitting: HOSPITALIST

## 2019-09-20 DIAGNOSIS — C20 ADENOCARCINOMA OF RECTUM (HCC): Primary | ICD-10-CM

## 2019-09-20 DIAGNOSIS — Z45.2 ENCOUNTER FOR FITTING AND ADJUSTMENT OF VASCULAR CATHETER: ICD-10-CM

## 2019-09-20 DIAGNOSIS — I26.99 OTHER ACUTE PULMONARY EMBOLISM WITHOUT ACUTE COR PULMONALE (HCC): Primary | ICD-10-CM

## 2019-09-20 PROBLEM — N39.0 UTI (URINARY TRACT INFECTION): Status: ACTIVE | Noted: 2019-09-20

## 2019-09-20 LAB
ALBUMIN SERPL-MCNC: 3.6 G/DL (ref 3.5–5.2)
ALBUMIN/GLOB SERPL: 1.2 G/DL
ALP SERPL-CCNC: 66 U/L (ref 39–117)
ALT SERPL W P-5'-P-CCNC: 45 U/L (ref 1–33)
ANION GAP SERPL CALCULATED.3IONS-SCNC: 12.9 MMOL/L (ref 5–15)
APTT PPP: 27 SECONDS (ref 22.7–35.4)
AST SERPL-CCNC: 18 U/L (ref 1–32)
BACTERIA SPEC AEROBE CULT: NO GROWTH
BASOPHILS # BLD AUTO: 0.02 10*3/MM3 (ref 0–0.2)
BASOPHILS NFR BLD AUTO: 0.3 % (ref 0–1.5)
BILIRUB SERPL-MCNC: 0.4 MG/DL (ref 0.2–1.2)
BUN BLD-MCNC: 7 MG/DL (ref 6–20)
BUN/CREAT SERPL: 8.4 (ref 7–25)
CALCIUM SPEC-SCNC: 8.7 MG/DL (ref 8.6–10.5)
CHLORIDE SERPL-SCNC: 99 MMOL/L (ref 98–107)
CO2 SERPL-SCNC: 25.1 MMOL/L (ref 22–29)
CREAT BLD-MCNC: 0.83 MG/DL (ref 0.57–1)
DEPRECATED RDW RBC AUTO: 52.2 FL (ref 37–54)
EOSINOPHIL # BLD AUTO: 0.2 10*3/MM3 (ref 0–0.4)
EOSINOPHIL NFR BLD AUTO: 3 % (ref 0.3–6.2)
ERYTHROCYTE [DISTWIDTH] IN BLOOD BY AUTOMATED COUNT: 16.9 % (ref 12.3–15.4)
GFR SERPL CREATININE-BSD FRML MDRD: 76 ML/MIN/1.73
GLOBULIN UR ELPH-MCNC: 2.9 GM/DL
GLUCOSE BLD-MCNC: 104 MG/DL (ref 65–99)
HCT VFR BLD AUTO: 40.6 % (ref 34–46.6)
HGB BLD-MCNC: 13.6 G/DL (ref 12–15.9)
HOLD SPECIMEN: NORMAL
HOLD SPECIMEN: NORMAL
IMM GRANULOCYTES # BLD AUTO: 0.03 10*3/MM3 (ref 0–0.05)
IMM GRANULOCYTES NFR BLD AUTO: 0.4 % (ref 0–0.5)
INR PPP: 1.08 (ref 0.9–1.1)
LYMPHOCYTES # BLD AUTO: 0.5 10*3/MM3 (ref 0.7–3.1)
LYMPHOCYTES NFR BLD AUTO: 7.5 % (ref 19.6–45.3)
MCH RBC QN AUTO: 30.2 PG (ref 26.6–33)
MCHC RBC AUTO-ENTMCNC: 33.5 G/DL (ref 31.5–35.7)
MCV RBC AUTO: 90.2 FL (ref 79–97)
MONOCYTES # BLD AUTO: 0.63 10*3/MM3 (ref 0.1–0.9)
MONOCYTES NFR BLD AUTO: 9.4 % (ref 5–12)
NEUTROPHILS # BLD AUTO: 5.32 10*3/MM3 (ref 1.7–7)
NEUTROPHILS NFR BLD AUTO: 79.4 % (ref 42.7–76)
NRBC BLD AUTO-RTO: 0 /100 WBC (ref 0–0.2)
NT-PROBNP SERPL-MCNC: 59.2 PG/ML (ref 5–450)
PLATELET # BLD AUTO: 222 10*3/MM3 (ref 140–450)
PMV BLD AUTO: 9.2 FL (ref 6–12)
POTASSIUM BLD-SCNC: 3.6 MMOL/L (ref 3.5–5.2)
PROT SERPL-MCNC: 6.5 G/DL (ref 6–8.5)
PROTHROMBIN TIME: 13.7 SECONDS (ref 11.7–14.2)
RBC # BLD AUTO: 4.5 10*6/MM3 (ref 3.77–5.28)
SODIUM BLD-SCNC: 137 MMOL/L (ref 136–145)
TROPONIN T SERPL-MCNC: <0.01 NG/ML (ref 0–0.03)
WBC NRBC COR # BLD: 6.7 10*3/MM3 (ref 3.4–10.8)
WHOLE BLOOD HOLD SPECIMEN: NORMAL
WHOLE BLOOD HOLD SPECIMEN: NORMAL

## 2019-09-20 PROCEDURE — 71275 CT ANGIOGRAPHY CHEST: CPT

## 2019-09-20 PROCEDURE — 93005 ELECTROCARDIOGRAM TRACING: CPT | Performed by: EMERGENCY MEDICINE

## 2019-09-20 PROCEDURE — 25010000002 IOPAMIDOL 61 % SOLUTION: Performed by: EMERGENCY MEDICINE

## 2019-09-20 PROCEDURE — 93010 ELECTROCARDIOGRAM REPORT: CPT | Performed by: INTERNAL MEDICINE

## 2019-09-20 PROCEDURE — 80053 COMPREHEN METABOLIC PANEL: CPT | Performed by: EMERGENCY MEDICINE

## 2019-09-20 PROCEDURE — 25010000002 HYDROMORPHONE PER 4 MG: Performed by: EMERGENCY MEDICINE

## 2019-09-20 PROCEDURE — 25010000002 HEPARIN (PORCINE) PER 1000 UNITS: Performed by: EMERGENCY MEDICINE

## 2019-09-20 PROCEDURE — 99284 EMERGENCY DEPT VISIT MOD MDM: CPT

## 2019-09-20 PROCEDURE — 85025 COMPLETE CBC W/AUTO DIFF WBC: CPT | Performed by: EMERGENCY MEDICINE

## 2019-09-20 PROCEDURE — 83880 ASSAY OF NATRIURETIC PEPTIDE: CPT | Performed by: EMERGENCY MEDICINE

## 2019-09-20 PROCEDURE — 85730 THROMBOPLASTIN TIME PARTIAL: CPT | Performed by: EMERGENCY MEDICINE

## 2019-09-20 PROCEDURE — 84484 ASSAY OF TROPONIN QUANT: CPT | Performed by: EMERGENCY MEDICINE

## 2019-09-20 PROCEDURE — 85610 PROTHROMBIN TIME: CPT | Performed by: EMERGENCY MEDICINE

## 2019-09-20 PROCEDURE — 25010000002 HYDROMORPHONE PER 4 MG: Performed by: HOSPITALIST

## 2019-09-20 RX ORDER — PHENAZOPYRIDINE HYDROCHLORIDE 100 MG/1
100 TABLET, FILM COATED ORAL 3 TIMES DAILY PRN
Status: DISCONTINUED | OUTPATIENT
Start: 2019-09-20 | End: 2019-09-24 | Stop reason: HOSPADM

## 2019-09-20 RX ORDER — HYDROCODONE BITARTRATE AND ACETAMINOPHEN 5; 325 MG/1; MG/1
1 TABLET ORAL EVERY 4 HOURS PRN
Status: DISCONTINUED | OUTPATIENT
Start: 2019-09-20 | End: 2019-09-24 | Stop reason: HOSPADM

## 2019-09-20 RX ORDER — CLONAZEPAM 0.5 MG/1
0.5 TABLET ORAL 2 TIMES DAILY PRN
Status: DISCONTINUED | OUTPATIENT
Start: 2019-09-20 | End: 2019-09-24 | Stop reason: HOSPADM

## 2019-09-20 RX ORDER — LOPERAMIDE HYDROCHLORIDE 2 MG/1
2 CAPSULE ORAL 4 TIMES DAILY PRN
COMMUNITY
End: 2019-12-07 | Stop reason: HOSPADM

## 2019-09-20 RX ORDER — ONDANSETRON 4 MG/1
8 TABLET, FILM COATED ORAL 3 TIMES DAILY PRN
Status: DISCONTINUED | OUTPATIENT
Start: 2019-09-20 | End: 2019-09-24 | Stop reason: HOSPADM

## 2019-09-20 RX ORDER — ESCITALOPRAM OXALATE 20 MG/1
20 TABLET ORAL DAILY
Status: DISCONTINUED | OUTPATIENT
Start: 2019-09-20 | End: 2019-09-24 | Stop reason: HOSPADM

## 2019-09-20 RX ORDER — SODIUM CHLORIDE 9 MG/ML
100 INJECTION, SOLUTION INTRAVENOUS CONTINUOUS
Status: ACTIVE | OUTPATIENT
Start: 2019-09-20 | End: 2019-09-21

## 2019-09-20 RX ORDER — SODIUM CHLORIDE 0.9 % (FLUSH) 0.9 %
10 SYRINGE (ML) INJECTION AS NEEDED
Status: CANCELLED | OUTPATIENT
Start: 2019-09-20

## 2019-09-20 RX ORDER — ONDANSETRON 2 MG/ML
4 INJECTION INTRAMUSCULAR; INTRAVENOUS EVERY 6 HOURS PRN
Status: DISCONTINUED | OUTPATIENT
Start: 2019-09-20 | End: 2019-09-24 | Stop reason: HOSPADM

## 2019-09-20 RX ORDER — ACETAMINOPHEN 650 MG/1
650 SUPPOSITORY RECTAL EVERY 4 HOURS PRN
Status: DISCONTINUED | OUTPATIENT
Start: 2019-09-20 | End: 2019-09-24 | Stop reason: HOSPADM

## 2019-09-20 RX ORDER — HEPARIN SODIUM 10000 [USP'U]/100ML
15.3 INJECTION, SOLUTION INTRAVENOUS
Status: DISCONTINUED | OUTPATIENT
Start: 2019-09-20 | End: 2019-09-23

## 2019-09-20 RX ORDER — POTASSIUM CHLORIDE 750 MG/1
20 CAPSULE, EXTENDED RELEASE ORAL DAILY
Status: DISCONTINUED | OUTPATIENT
Start: 2019-09-21 | End: 2019-09-24 | Stop reason: HOSPADM

## 2019-09-20 RX ORDER — ACETAMINOPHEN 325 MG/1
650 TABLET ORAL EVERY 4 HOURS PRN
Status: DISCONTINUED | OUTPATIENT
Start: 2019-09-20 | End: 2019-09-24 | Stop reason: HOSPADM

## 2019-09-20 RX ORDER — FAMOTIDINE 20 MG/1
20 TABLET, FILM COATED ORAL 2 TIMES DAILY
Status: DISCONTINUED | OUTPATIENT
Start: 2019-09-20 | End: 2019-09-24 | Stop reason: HOSPADM

## 2019-09-20 RX ORDER — SODIUM CHLORIDE 0.9 % (FLUSH) 0.9 %
10 SYRINGE (ML) INJECTION AS NEEDED
Status: DISCONTINUED | OUTPATIENT
Start: 2019-09-20 | End: 2019-09-20 | Stop reason: HOSPADM

## 2019-09-20 RX ORDER — SODIUM CHLORIDE 0.9 % (FLUSH) 0.9 %
10 SYRINGE (ML) INJECTION AS NEEDED
Status: DISCONTINUED | OUTPATIENT
Start: 2019-09-20 | End: 2019-09-20

## 2019-09-20 RX ORDER — HYDROMORPHONE HYDROCHLORIDE 1 MG/ML
0.5 INJECTION, SOLUTION INTRAMUSCULAR; INTRAVENOUS; SUBCUTANEOUS
Status: DISCONTINUED | OUTPATIENT
Start: 2019-09-20 | End: 2019-09-24 | Stop reason: HOSPADM

## 2019-09-20 RX ORDER — HYDROMORPHONE HYDROCHLORIDE 1 MG/ML
0.5 INJECTION, SOLUTION INTRAMUSCULAR; INTRAVENOUS; SUBCUTANEOUS ONCE
Status: COMPLETED | OUTPATIENT
Start: 2019-09-20 | End: 2019-09-20

## 2019-09-20 RX ORDER — LOPERAMIDE HYDROCHLORIDE 2 MG/1
2 CAPSULE ORAL 4 TIMES DAILY PRN
Status: DISCONTINUED | OUTPATIENT
Start: 2019-09-20 | End: 2019-09-24 | Stop reason: HOSPADM

## 2019-09-20 RX ORDER — ACETAMINOPHEN 160 MG/5ML
650 SOLUTION ORAL EVERY 4 HOURS PRN
Status: DISCONTINUED | OUTPATIENT
Start: 2019-09-20 | End: 2019-09-24 | Stop reason: HOSPADM

## 2019-09-20 RX ORDER — ONDANSETRON 4 MG/1
4 TABLET, FILM COATED ORAL EVERY 6 HOURS PRN
Status: DISCONTINUED | OUTPATIENT
Start: 2019-09-20 | End: 2019-09-20

## 2019-09-20 RX ORDER — PHENAZOPYRIDINE HYDROCHLORIDE 95 MG/1
95 TABLET ORAL 3 TIMES DAILY PRN
COMMUNITY
End: 2019-11-25

## 2019-09-20 RX ORDER — SULFAMETHOXAZOLE AND TRIMETHOPRIM 800; 160 MG/1; MG/1
1 TABLET ORAL 2 TIMES DAILY
Status: DISCONTINUED | OUTPATIENT
Start: 2019-09-20 | End: 2019-09-24 | Stop reason: HOSPADM

## 2019-09-20 RX ORDER — NITROGLYCERIN 0.4 MG/1
0.4 TABLET SUBLINGUAL
Status: DISCONTINUED | OUTPATIENT
Start: 2019-09-20 | End: 2019-09-24 | Stop reason: HOSPADM

## 2019-09-20 RX ORDER — DICYCLOMINE HYDROCHLORIDE 10 MG/1
20 CAPSULE ORAL EVERY 6 HOURS PRN
Status: DISCONTINUED | OUTPATIENT
Start: 2019-09-20 | End: 2019-09-20

## 2019-09-20 RX ADMIN — SODIUM CHLORIDE 100 ML/HR: 9 INJECTION, SOLUTION INTRAVENOUS at 23:40

## 2019-09-20 RX ADMIN — Medication 500 UNITS: at 13:38

## 2019-09-20 RX ADMIN — IOPAMIDOL 85 ML: 612 INJECTION, SOLUTION INTRAVENOUS at 16:20

## 2019-09-20 RX ADMIN — SULFAMETHOXAZOLE AND TRIMETHOPRIM 160 MG: 800; 160 TABLET ORAL at 23:40

## 2019-09-20 RX ADMIN — CLONAZEPAM 0.5 MG: 0.5 TABLET ORAL at 22:55

## 2019-09-20 RX ADMIN — HYDROMORPHONE HYDROCHLORIDE 0.5 MG: 10 INJECTION INTRAMUSCULAR; INTRAVENOUS; SUBCUTANEOUS at 22:55

## 2019-09-20 RX ADMIN — HEPARIN SODIUM 15.3 UNITS/KG/HR: 10000 INJECTION, SOLUTION INTRAVENOUS at 17:51

## 2019-09-20 RX ADMIN — FAMOTIDINE 20 MG: 20 TABLET, FILM COATED ORAL at 23:40

## 2019-09-20 RX ADMIN — ONDANSETRON 8 MG: 4 TABLET, FILM COATED ORAL at 22:55

## 2019-09-20 RX ADMIN — SODIUM CHLORIDE, PRESERVATIVE FREE 10 ML: 5 INJECTION INTRAVENOUS at 13:38

## 2019-09-20 RX ADMIN — HYDROMORPHONE HYDROCHLORIDE 0.5 MG: 1 INJECTION, SOLUTION INTRAMUSCULAR; INTRAVENOUS; SUBCUTANEOUS at 19:09

## 2019-09-21 ENCOUNTER — APPOINTMENT (OUTPATIENT)
Dept: CARDIOLOGY | Facility: HOSPITAL | Age: 39
End: 2019-09-21

## 2019-09-21 LAB
ANION GAP SERPL CALCULATED.3IONS-SCNC: 9.3 MMOL/L (ref 5–15)
APTT PPP: 54.2 SECONDS (ref 22.7–35.4)
APTT PPP: 94.7 SECONDS (ref 22.7–35.4)
BASOPHILS # BLD AUTO: 0.02 10*3/MM3 (ref 0–0.2)
BASOPHILS # BLD AUTO: 0.02 10*3/MM3 (ref 0–0.2)
BASOPHILS NFR BLD AUTO: 0.3 % (ref 0–1.5)
BASOPHILS NFR BLD AUTO: 0.4 % (ref 0–1.5)
BH CV LOWER VASCULAR LEFT COMMON FEMORAL AUGMENT: NORMAL
BH CV LOWER VASCULAR LEFT COMMON FEMORAL COMPETENT: NORMAL
BH CV LOWER VASCULAR LEFT COMMON FEMORAL COMPRESS: NORMAL
BH CV LOWER VASCULAR LEFT COMMON FEMORAL PHASIC: NORMAL
BH CV LOWER VASCULAR LEFT COMMON FEMORAL SPONT: NORMAL
BH CV LOWER VASCULAR LEFT DISTAL FEMORAL COMPRESS: NORMAL
BH CV LOWER VASCULAR LEFT GASTRONEMIUS COMPRESS: NORMAL
BH CV LOWER VASCULAR LEFT GREATER SAPH AK COMPRESS: NORMAL
BH CV LOWER VASCULAR LEFT GREATER SAPH BK COMPRESS: NORMAL
BH CV LOWER VASCULAR LEFT MID FEMORAL AUGMENT: NORMAL
BH CV LOWER VASCULAR LEFT MID FEMORAL COMPETENT: NORMAL
BH CV LOWER VASCULAR LEFT MID FEMORAL COMPRESS: NORMAL
BH CV LOWER VASCULAR LEFT MID FEMORAL PHASIC: NORMAL
BH CV LOWER VASCULAR LEFT MID FEMORAL SPONT: NORMAL
BH CV LOWER VASCULAR LEFT PERONEAL COMPRESS: NORMAL
BH CV LOWER VASCULAR LEFT POPLITEAL AUGMENT: NORMAL
BH CV LOWER VASCULAR LEFT POPLITEAL COMPETENT: NORMAL
BH CV LOWER VASCULAR LEFT POPLITEAL COMPRESS: NORMAL
BH CV LOWER VASCULAR LEFT POPLITEAL PHASIC: NORMAL
BH CV LOWER VASCULAR LEFT POPLITEAL SPONT: NORMAL
BH CV LOWER VASCULAR LEFT POSTERIOR TIBIAL COMPRESS: NORMAL
BH CV LOWER VASCULAR LEFT PROXIMAL FEMORAL COMPRESS: NORMAL
BH CV LOWER VASCULAR LEFT SAPHENOFEMORAL JUNCTION COMPRESS: NORMAL
BH CV LOWER VASCULAR RIGHT COMMON FEMORAL AUGMENT: NORMAL
BH CV LOWER VASCULAR RIGHT COMMON FEMORAL COMPETENT: NORMAL
BH CV LOWER VASCULAR RIGHT COMMON FEMORAL COMPRESS: NORMAL
BH CV LOWER VASCULAR RIGHT COMMON FEMORAL PHASIC: NORMAL
BH CV LOWER VASCULAR RIGHT COMMON FEMORAL SPONT: NORMAL
BH CV LOWER VASCULAR RIGHT DISTAL FEMORAL COMPRESS: NORMAL
BH CV LOWER VASCULAR RIGHT GASTRONEMIUS COMPRESS: NORMAL
BH CV LOWER VASCULAR RIGHT GREATER SAPH AK COMPRESS: NORMAL
BH CV LOWER VASCULAR RIGHT GREATER SAPH BK COMPRESS: NORMAL
BH CV LOWER VASCULAR RIGHT MID FEMORAL AUGMENT: NORMAL
BH CV LOWER VASCULAR RIGHT MID FEMORAL COMPETENT: NORMAL
BH CV LOWER VASCULAR RIGHT MID FEMORAL COMPRESS: NORMAL
BH CV LOWER VASCULAR RIGHT MID FEMORAL PHASIC: NORMAL
BH CV LOWER VASCULAR RIGHT MID FEMORAL SPONT: NORMAL
BH CV LOWER VASCULAR RIGHT PERONEAL COMPRESS: NORMAL
BH CV LOWER VASCULAR RIGHT POPLITEAL AUGMENT: NORMAL
BH CV LOWER VASCULAR RIGHT POPLITEAL COMPETENT: NORMAL
BH CV LOWER VASCULAR RIGHT POPLITEAL COMPRESS: NORMAL
BH CV LOWER VASCULAR RIGHT POPLITEAL PHASIC: NORMAL
BH CV LOWER VASCULAR RIGHT POPLITEAL SPONT: NORMAL
BH CV LOWER VASCULAR RIGHT POSTERIOR TIBIAL COMPRESS: NORMAL
BH CV LOWER VASCULAR RIGHT PROXIMAL FEMORAL COMPRESS: NORMAL
BH CV LOWER VASCULAR RIGHT SAPHENOFEMORAL JUNCTION COMPRESS: NORMAL
BH CV UPPER VENOUS LEFT AXILLARY AUGMENT: NORMAL
BH CV UPPER VENOUS LEFT AXILLARY COMPETENT: NORMAL
BH CV UPPER VENOUS LEFT AXILLARY COMPRESS: NORMAL
BH CV UPPER VENOUS LEFT AXILLARY PHASIC: NORMAL
BH CV UPPER VENOUS LEFT AXILLARY SPONT: NORMAL
BH CV UPPER VENOUS LEFT BASILIC FOREARM COMPRESS: NORMAL
BH CV UPPER VENOUS LEFT BASILIC UPPER COMPRESS: NORMAL
BH CV UPPER VENOUS LEFT BRACHIAL COMPRESS: NORMAL
BH CV UPPER VENOUS LEFT CEPHALIC FOREARM COMPRESS: NORMAL
BH CV UPPER VENOUS LEFT CEPHALIC UPPER COMPRESS: NORMAL
BH CV UPPER VENOUS LEFT INTERNAL JUGULAR AUGMENT: NORMAL
BH CV UPPER VENOUS LEFT INTERNAL JUGULAR COMPETENT: NORMAL
BH CV UPPER VENOUS LEFT INTERNAL JUGULAR COMPRESS: NORMAL
BH CV UPPER VENOUS LEFT INTERNAL JUGULAR PHASIC: NORMAL
BH CV UPPER VENOUS LEFT INTERNAL JUGULAR SPONT: NORMAL
BH CV UPPER VENOUS LEFT RADIAL COMPRESS: NORMAL
BH CV UPPER VENOUS LEFT SUBCLAVIAN AUGMENT: NORMAL
BH CV UPPER VENOUS LEFT SUBCLAVIAN COMPETENT: NORMAL
BH CV UPPER VENOUS LEFT SUBCLAVIAN COMPRESS: NORMAL
BH CV UPPER VENOUS LEFT SUBCLAVIAN PHASIC: NORMAL
BH CV UPPER VENOUS LEFT SUBCLAVIAN SPONT: NORMAL
BH CV UPPER VENOUS LEFT ULNAR COMPRESS: NORMAL
BH CV UPPER VENOUS RIGHT AXILLARY AUGMENT: NORMAL
BH CV UPPER VENOUS RIGHT AXILLARY COMPETENT: NORMAL
BH CV UPPER VENOUS RIGHT AXILLARY COMPRESS: NORMAL
BH CV UPPER VENOUS RIGHT AXILLARY PHASIC: NORMAL
BH CV UPPER VENOUS RIGHT AXILLARY SPONT: NORMAL
BH CV UPPER VENOUS RIGHT BASILIC FOREARM COMPRESS: NORMAL
BH CV UPPER VENOUS RIGHT BASILIC UPPER COMPRESS: NORMAL
BH CV UPPER VENOUS RIGHT BRACHIAL COMPRESS: NORMAL
BH CV UPPER VENOUS RIGHT CEPHALIC FOREARM COMPRESS: NORMAL
BH CV UPPER VENOUS RIGHT CEPHALIC UPPER COMPRESS: NORMAL
BH CV UPPER VENOUS RIGHT INTERNAL JUGULAR AUGMENT: NORMAL
BH CV UPPER VENOUS RIGHT INTERNAL JUGULAR COMPETENT: NORMAL
BH CV UPPER VENOUS RIGHT INTERNAL JUGULAR COMPRESS: NORMAL
BH CV UPPER VENOUS RIGHT INTERNAL JUGULAR PHASIC: NORMAL
BH CV UPPER VENOUS RIGHT INTERNAL JUGULAR SPONT: NORMAL
BH CV UPPER VENOUS RIGHT RADIAL COMPRESS: NORMAL
BH CV UPPER VENOUS RIGHT SUBCLAVIAN AUGMENT: NORMAL
BH CV UPPER VENOUS RIGHT SUBCLAVIAN COMPETENT: NORMAL
BH CV UPPER VENOUS RIGHT SUBCLAVIAN COMPRESS: NORMAL
BH CV UPPER VENOUS RIGHT SUBCLAVIAN PHASIC: NORMAL
BH CV UPPER VENOUS RIGHT SUBCLAVIAN SPONT: NORMAL
BH CV UPPER VENOUS RIGHT ULNAR COMPRESS: NORMAL
BUN BLD-MCNC: 7 MG/DL (ref 6–20)
BUN/CREAT SERPL: 9.5 (ref 7–25)
CALCIUM SPEC-SCNC: 8.4 MG/DL (ref 8.6–10.5)
CHLORIDE SERPL-SCNC: 102 MMOL/L (ref 98–107)
CO2 SERPL-SCNC: 24.7 MMOL/L (ref 22–29)
CREAT BLD-MCNC: 0.74 MG/DL (ref 0.57–1)
DEPRECATED RDW RBC AUTO: 49.9 FL (ref 37–54)
DEPRECATED RDW RBC AUTO: 51.6 FL (ref 37–54)
EOSINOPHIL # BLD AUTO: 0.17 10*3/MM3 (ref 0–0.4)
EOSINOPHIL # BLD AUTO: 0.25 10*3/MM3 (ref 0–0.4)
EOSINOPHIL NFR BLD AUTO: 2.7 % (ref 0.3–6.2)
EOSINOPHIL NFR BLD AUTO: 4.8 % (ref 0.3–6.2)
ERYTHROCYTE [DISTWIDTH] IN BLOOD BY AUTOMATED COUNT: 16.3 % (ref 12.3–15.4)
ERYTHROCYTE [DISTWIDTH] IN BLOOD BY AUTOMATED COUNT: 16.6 % (ref 12.3–15.4)
GFR SERPL CREATININE-BSD FRML MDRD: 87 ML/MIN/1.73
GLUCOSE BLD-MCNC: 141 MG/DL (ref 65–99)
HCT VFR BLD AUTO: 36.1 % (ref 34–46.6)
HCT VFR BLD AUTO: 36.7 % (ref 34–46.6)
HGB BLD-MCNC: 12 G/DL (ref 12–15.9)
HGB BLD-MCNC: 12.3 G/DL (ref 12–15.9)
IMM GRANULOCYTES # BLD AUTO: 0.03 10*3/MM3 (ref 0–0.05)
IMM GRANULOCYTES # BLD AUTO: 0.04 10*3/MM3 (ref 0–0.05)
IMM GRANULOCYTES NFR BLD AUTO: 0.6 % (ref 0–0.5)
IMM GRANULOCYTES NFR BLD AUTO: 0.6 % (ref 0–0.5)
LYMPHOCYTES # BLD AUTO: 0.3 10*3/MM3 (ref 0.7–3.1)
LYMPHOCYTES # BLD AUTO: 0.53 10*3/MM3 (ref 0.7–3.1)
LYMPHOCYTES NFR BLD AUTO: 10.1 % (ref 19.6–45.3)
LYMPHOCYTES NFR BLD AUTO: 4.8 % (ref 19.6–45.3)
MCH RBC QN AUTO: 30.1 PG (ref 26.6–33)
MCH RBC QN AUTO: 30.2 PG (ref 26.6–33)
MCHC RBC AUTO-ENTMCNC: 33.2 G/DL (ref 31.5–35.7)
MCHC RBC AUTO-ENTMCNC: 33.5 G/DL (ref 31.5–35.7)
MCV RBC AUTO: 89.7 FL (ref 79–97)
MCV RBC AUTO: 90.9 FL (ref 79–97)
MONOCYTES # BLD AUTO: 0.57 10*3/MM3 (ref 0.1–0.9)
MONOCYTES # BLD AUTO: 0.63 10*3/MM3 (ref 0.1–0.9)
MONOCYTES NFR BLD AUTO: 12 % (ref 5–12)
MONOCYTES NFR BLD AUTO: 9.1 % (ref 5–12)
NEUTROPHILS # BLD AUTO: 3.8 10*3/MM3 (ref 1.7–7)
NEUTROPHILS # BLD AUTO: 5.13 10*3/MM3 (ref 1.7–7)
NEUTROPHILS NFR BLD AUTO: 72.1 % (ref 42.7–76)
NEUTROPHILS NFR BLD AUTO: 82.5 % (ref 42.7–76)
NRBC BLD AUTO-RTO: 0 /100 WBC (ref 0–0.2)
NRBC BLD AUTO-RTO: 0.2 /100 WBC (ref 0–0.2)
PLATELET # BLD AUTO: 188 10*3/MM3 (ref 140–450)
PLATELET # BLD AUTO: 204 10*3/MM3 (ref 140–450)
PMV BLD AUTO: 8.9 FL (ref 6–12)
PMV BLD AUTO: 9 FL (ref 6–12)
POTASSIUM BLD-SCNC: 3.5 MMOL/L (ref 3.5–5.2)
RBC # BLD AUTO: 3.97 10*6/MM3 (ref 3.77–5.28)
RBC # BLD AUTO: 4.09 10*6/MM3 (ref 3.77–5.28)
SODIUM BLD-SCNC: 136 MMOL/L (ref 136–145)
WBC NRBC COR # BLD: 5.26 10*3/MM3 (ref 3.4–10.8)
WBC NRBC COR # BLD: 6.23 10*3/MM3 (ref 3.4–10.8)

## 2019-09-21 PROCEDURE — 80048 BASIC METABOLIC PNL TOTAL CA: CPT | Performed by: HOSPITALIST

## 2019-09-21 PROCEDURE — 85025 COMPLETE CBC W/AUTO DIFF WBC: CPT | Performed by: EMERGENCY MEDICINE

## 2019-09-21 PROCEDURE — 85730 THROMBOPLASTIN TIME PARTIAL: CPT | Performed by: INTERNAL MEDICINE

## 2019-09-21 PROCEDURE — 99232 SBSQ HOSP IP/OBS MODERATE 35: CPT | Performed by: INTERNAL MEDICINE

## 2019-09-21 PROCEDURE — 93970 EXTREMITY STUDY: CPT

## 2019-09-21 PROCEDURE — 25010000002 HEPARIN (PORCINE) PER 1000 UNITS: Performed by: EMERGENCY MEDICINE

## 2019-09-21 PROCEDURE — 25010000002 HYDROMORPHONE PER 4 MG: Performed by: HOSPITALIST

## 2019-09-21 RX ADMIN — CLONAZEPAM 0.5 MG: 0.5 TABLET ORAL at 22:07

## 2019-09-21 RX ADMIN — HEPARIN SODIUM 19.3 UNITS/KG/HR: 10000 INJECTION, SOLUTION INTRAVENOUS at 03:11

## 2019-09-21 RX ADMIN — HYOSCYAMINE SULFATE 125 MCG: 0.12 TABLET, ORALLY DISINTEGRATING ORAL at 08:12

## 2019-09-21 RX ADMIN — HYDROMORPHONE HYDROCHLORIDE 0.5 MG: 10 INJECTION INTRAMUSCULAR; INTRAVENOUS; SUBCUTANEOUS at 19:08

## 2019-09-21 RX ADMIN — HYDROMORPHONE HYDROCHLORIDE 0.5 MG: 10 INJECTION INTRAMUSCULAR; INTRAVENOUS; SUBCUTANEOUS at 04:05

## 2019-09-21 RX ADMIN — FAMOTIDINE 20 MG: 20 TABLET, FILM COATED ORAL at 20:32

## 2019-09-21 RX ADMIN — ESCITALOPRAM 20 MG: 20 TABLET, FILM COATED ORAL at 20:32

## 2019-09-21 RX ADMIN — HYOSCYAMINE SULFATE 125 MCG: 0.12 TABLET, ORALLY DISINTEGRATING ORAL at 20:32

## 2019-09-21 RX ADMIN — SULFAMETHOXAZOLE AND TRIMETHOPRIM 160 MG: 800; 160 TABLET ORAL at 20:32

## 2019-09-21 RX ADMIN — HYDROMORPHONE HYDROCHLORIDE 0.5 MG: 10 INJECTION INTRAMUSCULAR; INTRAVENOUS; SUBCUTANEOUS at 08:53

## 2019-09-21 RX ADMIN — HYOSCYAMINE SULFATE 125 MCG: 0.12 TABLET, ORALLY DISINTEGRATING ORAL at 00:27

## 2019-09-21 RX ADMIN — LOPERAMIDE HYDROCHLORIDE 2 MG: 2 CAPSULE ORAL at 00:26

## 2019-09-21 RX ADMIN — ESCITALOPRAM 20 MG: 20 TABLET, FILM COATED ORAL at 00:26

## 2019-09-21 RX ADMIN — HYDROMORPHONE HYDROCHLORIDE 0.5 MG: 10 INJECTION INTRAMUSCULAR; INTRAVENOUS; SUBCUTANEOUS at 22:07

## 2019-09-21 RX ADMIN — FAMOTIDINE 20 MG: 20 TABLET, FILM COATED ORAL at 08:12

## 2019-09-21 RX ADMIN — SILVER SULFADIAZINE: 10 CREAM TOPICAL at 00:26

## 2019-09-21 RX ADMIN — SILVER SULFADIAZINE: 10 CREAM TOPICAL at 12:34

## 2019-09-21 RX ADMIN — SULFAMETHOXAZOLE AND TRIMETHOPRIM 160 MG: 800; 160 TABLET ORAL at 08:12

## 2019-09-21 RX ADMIN — HEPARIN SODIUM 19.29 UNITS/KG/HR: 10000 INJECTION, SOLUTION INTRAVENOUS at 10:57

## 2019-09-21 RX ADMIN — SILVER SULFADIAZINE: 10 CREAM TOPICAL at 20:32

## 2019-09-22 LAB
ALBUMIN SERPL-MCNC: 2.7 G/DL (ref 3.5–5.2)
ALBUMIN/GLOB SERPL: 1 G/DL
ALP SERPL-CCNC: 51 U/L (ref 39–117)
ALT SERPL W P-5'-P-CCNC: 38 U/L (ref 1–33)
ANION GAP SERPL CALCULATED.3IONS-SCNC: 11.6 MMOL/L (ref 5–15)
APTT PPP: 159.8 SECONDS (ref 22.7–35.4)
APTT PPP: 75.8 SECONDS (ref 22.7–35.4)
AST SERPL-CCNC: 23 U/L (ref 1–32)
BASOPHILS # BLD AUTO: 0.03 10*3/MM3 (ref 0–0.2)
BASOPHILS NFR BLD AUTO: 0.7 % (ref 0–1.5)
BILIRUB SERPL-MCNC: <0.2 MG/DL (ref 0.2–1.2)
BUN BLD-MCNC: 6 MG/DL (ref 6–20)
BUN/CREAT SERPL: 8.7 (ref 7–25)
CALCIUM SPEC-SCNC: 8.2 MG/DL (ref 8.6–10.5)
CHLORIDE SERPL-SCNC: 102 MMOL/L (ref 98–107)
CO2 SERPL-SCNC: 21.4 MMOL/L (ref 22–29)
CREAT BLD-MCNC: 0.69 MG/DL (ref 0.57–1)
DEPRECATED RDW RBC AUTO: 50.5 FL (ref 37–54)
EOSINOPHIL # BLD AUTO: 0.42 10*3/MM3 (ref 0–0.4)
EOSINOPHIL NFR BLD AUTO: 9.5 % (ref 0.3–6.2)
ERYTHROCYTE [DISTWIDTH] IN BLOOD BY AUTOMATED COUNT: 16.2 % (ref 12.3–15.4)
GFR SERPL CREATININE-BSD FRML MDRD: 94 ML/MIN/1.73
GLOBULIN UR ELPH-MCNC: 2.6 GM/DL
GLUCOSE BLD-MCNC: 109 MG/DL (ref 65–99)
HCT VFR BLD AUTO: 34.5 % (ref 34–46.6)
HGB BLD-MCNC: 11.5 G/DL (ref 12–15.9)
IMM GRANULOCYTES # BLD AUTO: 0.03 10*3/MM3 (ref 0–0.05)
IMM GRANULOCYTES NFR BLD AUTO: 0.7 % (ref 0–0.5)
LYMPHOCYTES # BLD AUTO: 0.45 10*3/MM3 (ref 0.7–3.1)
LYMPHOCYTES NFR BLD AUTO: 10.1 % (ref 19.6–45.3)
MCH RBC QN AUTO: 30.1 PG (ref 26.6–33)
MCHC RBC AUTO-ENTMCNC: 33.3 G/DL (ref 31.5–35.7)
MCV RBC AUTO: 90.3 FL (ref 79–97)
MONOCYTES # BLD AUTO: 0.57 10*3/MM3 (ref 0.1–0.9)
MONOCYTES NFR BLD AUTO: 12.8 % (ref 5–12)
NEUTROPHILS # BLD AUTO: 2.94 10*3/MM3 (ref 1.7–7)
NEUTROPHILS NFR BLD AUTO: 66.2 % (ref 42.7–76)
NRBC BLD AUTO-RTO: 0 /100 WBC (ref 0–0.2)
PLATELET # BLD AUTO: 207 10*3/MM3 (ref 140–450)
PMV BLD AUTO: 9.4 FL (ref 6–12)
POTASSIUM BLD-SCNC: 3.4 MMOL/L (ref 3.5–5.2)
PROT SERPL-MCNC: 5.3 G/DL (ref 6–8.5)
RBC # BLD AUTO: 3.82 10*6/MM3 (ref 3.77–5.28)
SODIUM BLD-SCNC: 135 MMOL/L (ref 136–145)
WBC NRBC COR # BLD: 4.44 10*3/MM3 (ref 3.4–10.8)

## 2019-09-22 PROCEDURE — 85025 COMPLETE CBC W/AUTO DIFF WBC: CPT | Performed by: EMERGENCY MEDICINE

## 2019-09-22 PROCEDURE — 99232 SBSQ HOSP IP/OBS MODERATE 35: CPT | Performed by: INTERNAL MEDICINE

## 2019-09-22 PROCEDURE — 85730 THROMBOPLASTIN TIME PARTIAL: CPT | Performed by: INTERNAL MEDICINE

## 2019-09-22 PROCEDURE — 80053 COMPREHEN METABOLIC PANEL: CPT | Performed by: INTERNAL MEDICINE

## 2019-09-22 PROCEDURE — 25010000002 HEPARIN (PORCINE) PER 1000 UNITS: Performed by: EMERGENCY MEDICINE

## 2019-09-22 PROCEDURE — 25010000002 HYDROMORPHONE PER 4 MG: Performed by: HOSPITALIST

## 2019-09-22 PROCEDURE — 85730 THROMBOPLASTIN TIME PARTIAL: CPT | Performed by: HOSPITALIST

## 2019-09-22 RX ORDER — SODIUM CHLORIDE 9 MG/ML
100 INJECTION, SOLUTION INTRAVENOUS CONTINUOUS
Status: DISCONTINUED | OUTPATIENT
Start: 2019-09-22 | End: 2019-09-23

## 2019-09-22 RX ADMIN — CLONAZEPAM 0.5 MG: 0.5 TABLET ORAL at 11:42

## 2019-09-22 RX ADMIN — POTASSIUM CHLORIDE 20 MEQ: 750 CAPSULE, EXTENDED RELEASE ORAL at 13:47

## 2019-09-22 RX ADMIN — SULFAMETHOXAZOLE AND TRIMETHOPRIM 160 MG: 800; 160 TABLET ORAL at 10:33

## 2019-09-22 RX ADMIN — HEPARIN SODIUM 16.3 UNITS/KG/HR: 10000 INJECTION, SOLUTION INTRAVENOUS at 19:31

## 2019-09-22 RX ADMIN — HYDROMORPHONE HYDROCHLORIDE 0.5 MG: 10 INJECTION INTRAMUSCULAR; INTRAVENOUS; SUBCUTANEOUS at 09:49

## 2019-09-22 RX ADMIN — ESCITALOPRAM 20 MG: 20 TABLET, FILM COATED ORAL at 21:04

## 2019-09-22 RX ADMIN — HYDROMORPHONE HYDROCHLORIDE 0.5 MG: 10 INJECTION INTRAMUSCULAR; INTRAVENOUS; SUBCUTANEOUS at 21:04

## 2019-09-22 RX ADMIN — HYDROMORPHONE HYDROCHLORIDE 0.5 MG: 10 INJECTION INTRAMUSCULAR; INTRAVENOUS; SUBCUTANEOUS at 12:20

## 2019-09-22 RX ADMIN — HEPARIN SODIUM 16.3 UNITS/KG/HR: 10000 INJECTION, SOLUTION INTRAVENOUS at 07:14

## 2019-09-22 RX ADMIN — HYOSCYAMINE SULFATE 125 MCG: 0.12 TABLET, ORALLY DISINTEGRATING ORAL at 11:39

## 2019-09-22 RX ADMIN — FAMOTIDINE 20 MG: 20 TABLET, FILM COATED ORAL at 10:33

## 2019-09-22 RX ADMIN — HYOSCYAMINE SULFATE 125 MCG: 0.12 TABLET, ORALLY DISINTEGRATING ORAL at 21:04

## 2019-09-22 RX ADMIN — FAMOTIDINE 20 MG: 20 TABLET, FILM COATED ORAL at 21:04

## 2019-09-22 RX ADMIN — HEPARIN SODIUM 19.3 UNITS/KG/HR: 10000 INJECTION, SOLUTION INTRAVENOUS at 02:17

## 2019-09-22 RX ADMIN — CLONAZEPAM 0.5 MG: 0.5 TABLET ORAL at 21:04

## 2019-09-22 RX ADMIN — SILVER SULFADIAZINE: 10 CREAM TOPICAL at 18:46

## 2019-09-22 RX ADMIN — LOPERAMIDE HYDROCHLORIDE 2 MG: 2 CAPSULE ORAL at 10:34

## 2019-09-22 RX ADMIN — LOPERAMIDE HYDROCHLORIDE 2 MG: 2 CAPSULE ORAL at 11:44

## 2019-09-22 RX ADMIN — SODIUM CHLORIDE 100 ML/HR: 9 INJECTION, SOLUTION INTRAVENOUS at 13:46

## 2019-09-22 RX ADMIN — HEPARIN SODIUM 15.3 UNITS/KG/HR: 10000 INJECTION, SOLUTION INTRAVENOUS at 06:41

## 2019-09-22 RX ADMIN — SULFAMETHOXAZOLE AND TRIMETHOPRIM 160 MG: 800; 160 TABLET ORAL at 21:04

## 2019-09-22 RX ADMIN — SODIUM CHLORIDE 100 ML/HR: 9 INJECTION, SOLUTION INTRAVENOUS at 22:13

## 2019-09-23 ENCOUNTER — APPOINTMENT (OUTPATIENT)
Dept: LAB | Facility: HOSPITAL | Age: 39
End: 2019-09-23

## 2019-09-23 ENCOUNTER — APPOINTMENT (OUTPATIENT)
Dept: ONCOLOGY | Facility: CLINIC | Age: 39
End: 2019-09-23

## 2019-09-23 LAB
ALBUMIN SERPL-MCNC: 2.6 G/DL (ref 3.5–5.2)
ALBUMIN/GLOB SERPL: 1.2 G/DL
ALP SERPL-CCNC: 51 U/L (ref 39–117)
ALT SERPL W P-5'-P-CCNC: 47 U/L (ref 1–33)
ANION GAP SERPL CALCULATED.3IONS-SCNC: 10.5 MMOL/L (ref 5–15)
APTT PPP: 113.8 SECONDS (ref 22.7–35.4)
APTT PPP: 71.5 SECONDS (ref 22.7–35.4)
AST SERPL-CCNC: 24 U/L (ref 1–32)
BASOPHILS # BLD AUTO: 0.02 10*3/MM3 (ref 0–0.2)
BASOPHILS NFR BLD AUTO: 0.6 % (ref 0–1.5)
BILIRUB SERPL-MCNC: <0.2 MG/DL (ref 0.2–1.2)
BUN BLD-MCNC: 5 MG/DL (ref 6–20)
BUN/CREAT SERPL: 8.3 (ref 7–25)
CALCIUM SPEC-SCNC: 7.6 MG/DL (ref 8.6–10.5)
CHLORIDE SERPL-SCNC: 107 MMOL/L (ref 98–107)
CO2 SERPL-SCNC: 20.5 MMOL/L (ref 22–29)
CREAT BLD-MCNC: 0.6 MG/DL (ref 0.57–1)
DEPRECATED RDW RBC AUTO: 53.5 FL (ref 37–54)
EOSINOPHIL # BLD AUTO: 0.25 10*3/MM3 (ref 0–0.4)
EOSINOPHIL NFR BLD AUTO: 7.1 % (ref 0.3–6.2)
ERYTHROCYTE [DISTWIDTH] IN BLOOD BY AUTOMATED COUNT: 16.9 % (ref 12.3–15.4)
GFR SERPL CREATININE-BSD FRML MDRD: 111 ML/MIN/1.73
GLOBULIN UR ELPH-MCNC: 2.1 GM/DL
GLUCOSE BLD-MCNC: 96 MG/DL (ref 65–99)
HCT VFR BLD AUTO: 33.7 % (ref 34–46.6)
HGB BLD-MCNC: 11 G/DL (ref 12–15.9)
IMM GRANULOCYTES # BLD AUTO: 0.03 10*3/MM3 (ref 0–0.05)
IMM GRANULOCYTES NFR BLD AUTO: 0.8 % (ref 0–0.5)
LYMPHOCYTES # BLD AUTO: 0.38 10*3/MM3 (ref 0.7–3.1)
LYMPHOCYTES NFR BLD AUTO: 10.7 % (ref 19.6–45.3)
MCH RBC QN AUTO: 29.8 PG (ref 26.6–33)
MCHC RBC AUTO-ENTMCNC: 32.6 G/DL (ref 31.5–35.7)
MCV RBC AUTO: 91.3 FL (ref 79–97)
MONOCYTES # BLD AUTO: 0.45 10*3/MM3 (ref 0.1–0.9)
MONOCYTES NFR BLD AUTO: 12.7 % (ref 5–12)
NEUTROPHILS # BLD AUTO: 2.41 10*3/MM3 (ref 1.7–7)
NEUTROPHILS NFR BLD AUTO: 68.1 % (ref 42.7–76)
NRBC BLD AUTO-RTO: 0 /100 WBC (ref 0–0.2)
PLATELET # BLD AUTO: 202 10*3/MM3 (ref 140–450)
PMV BLD AUTO: 8.9 FL (ref 6–12)
POTASSIUM BLD-SCNC: 3.8 MMOL/L (ref 3.5–5.2)
PROT SERPL-MCNC: 4.7 G/DL (ref 6–8.5)
RBC # BLD AUTO: 3.69 10*6/MM3 (ref 3.77–5.28)
SODIUM BLD-SCNC: 138 MMOL/L (ref 136–145)
WBC NRBC COR # BLD: 3.54 10*3/MM3 (ref 3.4–10.8)

## 2019-09-23 PROCEDURE — 99232 SBSQ HOSP IP/OBS MODERATE 35: CPT | Performed by: INTERNAL MEDICINE

## 2019-09-23 PROCEDURE — 85730 THROMBOPLASTIN TIME PARTIAL: CPT | Performed by: HOSPITALIST

## 2019-09-23 PROCEDURE — 85730 THROMBOPLASTIN TIME PARTIAL: CPT | Performed by: INTERNAL MEDICINE

## 2019-09-23 PROCEDURE — 85025 COMPLETE CBC W/AUTO DIFF WBC: CPT | Performed by: EMERGENCY MEDICINE

## 2019-09-23 PROCEDURE — 25010000002 HYDROMORPHONE PER 4 MG: Performed by: HOSPITALIST

## 2019-09-23 PROCEDURE — 80053 COMPREHEN METABOLIC PANEL: CPT | Performed by: INTERNAL MEDICINE

## 2019-09-23 PROCEDURE — 25010000002 HEPARIN (PORCINE) PER 1000 UNITS: Performed by: EMERGENCY MEDICINE

## 2019-09-23 PROCEDURE — 99253 IP/OBS CNSLTJ NEW/EST LOW 45: CPT | Performed by: COLON & RECTAL SURGERY

## 2019-09-23 RX ADMIN — HYOSCYAMINE SULFATE 125 MCG: 0.12 TABLET, ORALLY DISINTEGRATING ORAL at 08:52

## 2019-09-23 RX ADMIN — FAMOTIDINE 20 MG: 20 TABLET, FILM COATED ORAL at 21:06

## 2019-09-23 RX ADMIN — HYDROMORPHONE HYDROCHLORIDE 0.5 MG: 10 INJECTION INTRAMUSCULAR; INTRAVENOUS; SUBCUTANEOUS at 21:19

## 2019-09-23 RX ADMIN — HYDROMORPHONE HYDROCHLORIDE 0.5 MG: 10 INJECTION INTRAMUSCULAR; INTRAVENOUS; SUBCUTANEOUS at 04:05

## 2019-09-23 RX ADMIN — SULFAMETHOXAZOLE AND TRIMETHOPRIM 160 MG: 800; 160 TABLET ORAL at 08:45

## 2019-09-23 RX ADMIN — FAMOTIDINE 20 MG: 20 TABLET, FILM COATED ORAL at 08:45

## 2019-09-23 RX ADMIN — ESCITALOPRAM 20 MG: 20 TABLET, FILM COATED ORAL at 21:06

## 2019-09-23 RX ADMIN — HYDROMORPHONE HYDROCHLORIDE 0.5 MG: 10 INJECTION INTRAMUSCULAR; INTRAVENOUS; SUBCUTANEOUS at 08:45

## 2019-09-23 RX ADMIN — CLONAZEPAM 0.5 MG: 0.5 TABLET ORAL at 08:52

## 2019-09-23 RX ADMIN — HYDROMORPHONE HYDROCHLORIDE 0.5 MG: 10 INJECTION INTRAMUSCULAR; INTRAVENOUS; SUBCUTANEOUS at 12:16

## 2019-09-23 RX ADMIN — HEPARIN SODIUM 14.3 UNITS/KG/HR: 10000 INJECTION, SOLUTION INTRAVENOUS at 12:16

## 2019-09-23 RX ADMIN — POTASSIUM CHLORIDE 20 MEQ: 750 CAPSULE, EXTENDED RELEASE ORAL at 08:45

## 2019-09-23 RX ADMIN — HYDROMORPHONE HYDROCHLORIDE 0.5 MG: 10 INJECTION INTRAMUSCULAR; INTRAVENOUS; SUBCUTANEOUS at 17:43

## 2019-09-23 RX ADMIN — RIVAROXABAN 15 MG: 15 TABLET, FILM COATED ORAL at 17:43

## 2019-09-23 RX ADMIN — SULFAMETHOXAZOLE AND TRIMETHOPRIM 160 MG: 800; 160 TABLET ORAL at 21:06

## 2019-09-23 RX ADMIN — CLONAZEPAM 0.5 MG: 0.5 TABLET ORAL at 21:19

## 2019-09-23 RX ADMIN — SODIUM CHLORIDE 100 ML/HR: 9 INJECTION, SOLUTION INTRAVENOUS at 08:47

## 2019-09-23 RX ADMIN — HYOSCYAMINE SULFATE 125 MCG: 0.12 TABLET, ORALLY DISINTEGRATING ORAL at 21:19

## 2019-09-23 RX ADMIN — SILVER SULFADIAZINE: 10 CREAM TOPICAL at 21:27

## 2019-09-24 VITALS
WEIGHT: 223.4 LBS | OXYGEN SATURATION: 95 % | DIASTOLIC BLOOD PRESSURE: 74 MMHG | SYSTOLIC BLOOD PRESSURE: 113 MMHG | HEIGHT: 66 IN | BODY MASS INDEX: 35.9 KG/M2 | HEART RATE: 81 BPM | TEMPERATURE: 98 F | RESPIRATION RATE: 18 BRPM

## 2019-09-24 LAB
ANION GAP SERPL CALCULATED.3IONS-SCNC: 9.9 MMOL/L (ref 5–15)
ANISOCYTOSIS BLD QL: NORMAL
BASOPHILS # BLD AUTO: 0.02 10*3/MM3 (ref 0–0.2)
BASOPHILS NFR BLD AUTO: 0.6 % (ref 0–1.5)
BUN BLD-MCNC: 7 MG/DL (ref 6–20)
BUN/CREAT SERPL: 9.5 (ref 7–25)
CALCIUM SPEC-SCNC: 8 MG/DL (ref 8.6–10.5)
CHLORIDE SERPL-SCNC: 102 MMOL/L (ref 98–107)
CO2 SERPL-SCNC: 24.1 MMOL/L (ref 22–29)
CREAT BLD-MCNC: 0.74 MG/DL (ref 0.57–1)
DEPRECATED RDW RBC AUTO: 54 FL (ref 37–54)
EOSINOPHIL # BLD AUTO: 0.2 10*3/MM3 (ref 0–0.4)
EOSINOPHIL NFR BLD AUTO: 5.6 % (ref 0.3–6.2)
ERYTHROCYTE [DISTWIDTH] IN BLOOD BY AUTOMATED COUNT: 17.1 % (ref 12.3–15.4)
GFR SERPL CREATININE-BSD FRML MDRD: 87 ML/MIN/1.73
GLUCOSE BLD-MCNC: 112 MG/DL (ref 65–99)
HCT VFR BLD AUTO: 34.1 % (ref 34–46.6)
HGB BLD-MCNC: 11.2 G/DL (ref 12–15.9)
LYMPHOCYTES # BLD AUTO: 0.36 10*3/MM3 (ref 0.7–3.1)
LYMPHOCYTES NFR BLD AUTO: 10 % (ref 19.6–45.3)
MCH RBC QN AUTO: 29.6 PG (ref 26.6–33)
MCHC RBC AUTO-ENTMCNC: 32.8 G/DL (ref 31.5–35.7)
MCV RBC AUTO: 90 FL (ref 79–97)
MONOCYTES # BLD AUTO: 0.44 10*3/MM3 (ref 0.1–0.9)
MONOCYTES NFR BLD AUTO: 12.3 % (ref 5–12)
NEUTROPHILS # BLD AUTO: 2.55 10*3/MM3 (ref 1.7–7)
NEUTROPHILS NFR BLD AUTO: 70.9 % (ref 42.7–76)
PLAT MORPH BLD: NORMAL
PLATELET # BLD AUTO: 224 10*3/MM3 (ref 140–450)
PMV BLD AUTO: 9 FL (ref 6–12)
POTASSIUM BLD-SCNC: 3.8 MMOL/L (ref 3.5–5.2)
RBC # BLD AUTO: 3.79 10*6/MM3 (ref 3.77–5.28)
SODIUM BLD-SCNC: 136 MMOL/L (ref 136–145)
WBC MORPH BLD: NORMAL
WBC NRBC COR # BLD: 3.59 10*3/MM3 (ref 3.4–10.8)

## 2019-09-24 PROCEDURE — 85007 BL SMEAR W/DIFF WBC COUNT: CPT | Performed by: HOSPITALIST

## 2019-09-24 PROCEDURE — 85025 COMPLETE CBC W/AUTO DIFF WBC: CPT | Performed by: HOSPITALIST

## 2019-09-24 PROCEDURE — 99232 SBSQ HOSP IP/OBS MODERATE 35: CPT | Performed by: INTERNAL MEDICINE

## 2019-09-24 PROCEDURE — 80048 BASIC METABOLIC PNL TOTAL CA: CPT | Performed by: HOSPITALIST

## 2019-09-24 RX ORDER — DOCUSATE SODIUM 100 MG/1
100 CAPSULE, LIQUID FILLED ORAL 2 TIMES DAILY
Status: DISCONTINUED | OUTPATIENT
Start: 2019-09-24 | End: 2019-09-24 | Stop reason: HOSPADM

## 2019-09-24 RX ORDER — OXYCODONE HYDROCHLORIDE AND ACETAMINOPHEN 5; 325 MG/1; MG/1
1 TABLET ORAL EVERY 6 HOURS PRN
Qty: 30 TABLET | Refills: 0 | Status: SHIPPED | OUTPATIENT
Start: 2019-09-24 | End: 2019-11-11 | Stop reason: SDUPTHER

## 2019-09-24 RX ADMIN — RIVAROXABAN 15 MG: 15 TABLET, FILM COATED ORAL at 08:48

## 2019-09-24 RX ADMIN — FAMOTIDINE 20 MG: 20 TABLET, FILM COATED ORAL at 08:48

## 2019-09-24 RX ADMIN — POTASSIUM CHLORIDE 20 MEQ: 750 CAPSULE, EXTENDED RELEASE ORAL at 08:48

## 2019-09-24 RX ADMIN — SULFAMETHOXAZOLE AND TRIMETHOPRIM 160 MG: 800; 160 TABLET ORAL at 08:48

## 2019-09-24 RX ADMIN — DOCUSATE SODIUM 100 MG: 100 CAPSULE, LIQUID FILLED ORAL at 08:47

## 2019-09-25 ENCOUNTER — READMISSION MANAGEMENT (OUTPATIENT)
Dept: CALL CENTER | Facility: HOSPITAL | Age: 39
End: 2019-09-25

## 2019-09-25 NOTE — OUTREACH NOTE
Prep Survey      Responses   Facility patient discharged from?  [place]   Is patient eligible?  Yes   Discharge diagnosis  PE, UTI   Does the patient have one of the following disease processes/diagnoses(primary or secondary)?  Other   Does the patient have Home health ordered?  No   Is there a DME ordered?  No   Medication alerts for this patient  Xarelto   General alerts for this patient  CA of rectum   Prep survey completed?  Yes          Cherie Noriega, CHRISTIANN

## 2019-09-28 ENCOUNTER — READMISSION MANAGEMENT (OUTPATIENT)
Dept: CALL CENTER | Facility: HOSPITAL | Age: 39
End: 2019-09-28

## 2019-09-28 NOTE — OUTREACH NOTE
Medical Week 1 Survey      Responses   Facility patient discharged from?  [place]   Does the patient have one of the following disease processes/diagnoses(primary or secondary)?  Other   Is there a successful TCM telephone encounter documented?  No   Week 1 attempt successful?  Yes   Call start time  1311   Call end time  1312   General alerts for this patient  CA of rectum   Discharge diagnosis  PE, UTI   Meds reviewed with patient/caregiver?  Yes   Is the patient having any side effects they believe may be caused by any medication additions or changes?  No   Does the patient have all medications ordered at discharge?  Yes   Is the patient taking all medications as directed (includes completed medication regime)?  Yes   Does the patient have a primary care provider?   Yes   Does the patient have an appointment with their PCP within 7 days of discharge?  Yes   Has the patient kept scheduled appointments due by today?  Yes   Comments  Follows with oncology as PCP for now.   Has home health visited the patient within 72 hours of discharge?  N/A   Psychosocial issues?  No   Comments  No complaints. Will see her PCP. Has her meds. Taking Xarelto correctly.   Did the patient receive a copy of their discharge instructions?  Yes   Nursing interventions  Reviewed instructions with patient   What is the patient's perception of their health status since discharge?  Improving   Is the patient/caregiver able to teach back signs and symptoms related to disease process for when to call PCP?  Yes   Is the patient/caregiver able to teach back signs and symptoms related to disease process for when to call 911?  Yes   Is the patient/caregiver able to teach back the hierarchy of who to call/visit for symptoms/problems? PCP, Specialist, Home health nurse, Urgent Care, ED, 911  Yes   Week 1 call completed?  Yes          Eusebio Goldman RN

## 2019-10-01 ENCOUNTER — HOSPITAL ENCOUNTER (EMERGENCY)
Facility: HOSPITAL | Age: 39
Discharge: HOME | End: 2019-10-01
Attending: EMERGENCY MEDICINE | Admitting: EMERGENCY MEDICINE

## 2019-10-01 ENCOUNTER — APPOINTMENT (OUTPATIENT)
Dept: CT IMAGING | Facility: HOSPITAL | Age: 39
End: 2019-10-01

## 2019-10-01 VITALS
WEIGHT: 223 LBS | RESPIRATION RATE: 18 BRPM | TEMPERATURE: 96.9 F | OXYGEN SATURATION: 97 % | BODY MASS INDEX: 35.84 KG/M2 | DIASTOLIC BLOOD PRESSURE: 90 MMHG | SYSTOLIC BLOOD PRESSURE: 131 MMHG | HEIGHT: 66 IN | HEART RATE: 71 BPM

## 2019-10-01 DIAGNOSIS — N20.1 RIGHT URETERAL STONE: Primary | ICD-10-CM

## 2019-10-01 LAB
ALBUMIN SERPL-MCNC: 3.8 G/DL (ref 3.5–5.2)
ALBUMIN/GLOB SERPL: 1.7 G/DL
ALP SERPL-CCNC: 88 U/L (ref 39–117)
ALT SERPL W P-5'-P-CCNC: 43 U/L (ref 1–33)
ANION GAP SERPL CALCULATED.3IONS-SCNC: 6.9 MMOL/L (ref 5–15)
AST SERPL-CCNC: 25 U/L (ref 1–32)
BACTERIA UR QL AUTO: ABNORMAL /HPF
BASOPHILS # BLD AUTO: 0.02 10*3/MM3 (ref 0–0.2)
BASOPHILS NFR BLD AUTO: 0.5 % (ref 0–1.5)
BILIRUB SERPL-MCNC: 0.2 MG/DL (ref 0.2–1.2)
BILIRUB UR QL STRIP: NEGATIVE
BUN BLD-MCNC: 12 MG/DL (ref 6–20)
BUN/CREAT SERPL: 11 (ref 7–25)
CALCIUM SPEC-SCNC: 8.9 MG/DL (ref 8.6–10.5)
CHLORIDE SERPL-SCNC: 101 MMOL/L (ref 98–107)
CLARITY UR: CLEAR
CO2 SERPL-SCNC: 32.1 MMOL/L (ref 22–29)
COD CRY URNS QL: ABNORMAL /HPF
COLOR UR: YELLOW
CREAT BLD-MCNC: 1.09 MG/DL (ref 0.57–1)
DEPRECATED RDW RBC AUTO: 56 FL (ref 37–54)
EOSINOPHIL # BLD AUTO: 0.14 10*3/MM3 (ref 0–0.4)
EOSINOPHIL NFR BLD AUTO: 3.4 % (ref 0.3–6.2)
ERYTHROCYTE [DISTWIDTH] IN BLOOD BY AUTOMATED COUNT: 17.4 % (ref 12.3–15.4)
GFR SERPL CREATININE-BSD FRML MDRD: 56 ML/MIN/1.73
GLOBULIN UR ELPH-MCNC: 2.3 GM/DL
GLUCOSE BLD-MCNC: 93 MG/DL (ref 65–99)
GLUCOSE UR STRIP-MCNC: NEGATIVE MG/DL
HCT VFR BLD AUTO: 40 % (ref 34–46.6)
HGB BLD-MCNC: 13.9 G/DL (ref 12–15.9)
HGB UR QL STRIP.AUTO: ABNORMAL
HYALINE CASTS UR QL AUTO: ABNORMAL /LPF
IMM GRANULOCYTES # BLD AUTO: 0.02 10*3/MM3 (ref 0–0.05)
IMM GRANULOCYTES NFR BLD AUTO: 0.5 % (ref 0–0.5)
INR PPP: 1.14 (ref 0.9–1.1)
KETONES UR QL STRIP: NEGATIVE
LEUKOCYTE ESTERASE UR QL STRIP.AUTO: ABNORMAL
LYMPHOCYTES # BLD AUTO: 0.57 10*3/MM3 (ref 0.7–3.1)
LYMPHOCYTES NFR BLD AUTO: 13.8 % (ref 19.6–45.3)
MCH RBC QN AUTO: 31.7 PG (ref 26.6–33)
MCHC RBC AUTO-ENTMCNC: 34.8 G/DL (ref 31.5–35.7)
MCV RBC AUTO: 91.3 FL (ref 79–97)
MONOCYTES # BLD AUTO: 0.62 10*3/MM3 (ref 0.1–0.9)
MONOCYTES NFR BLD AUTO: 15 % (ref 5–12)
NEUTROPHILS # BLD AUTO: 2.76 10*3/MM3 (ref 1.7–7)
NEUTROPHILS NFR BLD AUTO: 66.8 % (ref 42.7–76)
NITRITE UR QL STRIP: NEGATIVE
NRBC BLD AUTO-RTO: 0 /100 WBC (ref 0–0.2)
PH UR STRIP.AUTO: 6.5 [PH] (ref 5–8)
PLATELET # BLD AUTO: 263 10*3/MM3 (ref 140–450)
PMV BLD AUTO: 8.9 FL (ref 6–12)
POTASSIUM BLD-SCNC: 4.7 MMOL/L (ref 3.5–5.2)
PROT SERPL-MCNC: 6.1 G/DL (ref 6–8.5)
PROT UR QL STRIP: ABNORMAL
PROTHROMBIN TIME: 14.3 SECONDS (ref 11.7–14.2)
RBC # BLD AUTO: 4.38 10*6/MM3 (ref 3.77–5.28)
RBC # UR: ABNORMAL /HPF
REF LAB TEST METHOD: ABNORMAL
SODIUM BLD-SCNC: 140 MMOL/L (ref 136–145)
SP GR UR STRIP: 1.02 (ref 1–1.03)
SQUAMOUS #/AREA URNS HPF: ABNORMAL /HPF
UROBILINOGEN UR QL STRIP: ABNORMAL
WBC NRBC COR # BLD: 4.13 10*3/MM3 (ref 3.4–10.8)
WBC UR QL AUTO: ABNORMAL /HPF

## 2019-10-01 PROCEDURE — 74177 CT ABD & PELVIS W/CONTRAST: CPT

## 2019-10-01 PROCEDURE — 81001 URINALYSIS AUTO W/SCOPE: CPT | Performed by: EMERGENCY MEDICINE

## 2019-10-01 PROCEDURE — 96375 TX/PRO/DX INJ NEW DRUG ADDON: CPT

## 2019-10-01 PROCEDURE — 85025 COMPLETE CBC W/AUTO DIFF WBC: CPT | Performed by: EMERGENCY MEDICINE

## 2019-10-01 PROCEDURE — 99283 EMERGENCY DEPT VISIT LOW MDM: CPT

## 2019-10-01 PROCEDURE — 85610 PROTHROMBIN TIME: CPT | Performed by: EMERGENCY MEDICINE

## 2019-10-01 PROCEDURE — 25010000002 ONDANSETRON PER 1 MG: Performed by: EMERGENCY MEDICINE

## 2019-10-01 PROCEDURE — 96374 THER/PROPH/DIAG INJ IV PUSH: CPT

## 2019-10-01 PROCEDURE — 25010000002 IOPAMIDOL 61 % SOLUTION: Performed by: EMERGENCY MEDICINE

## 2019-10-01 PROCEDURE — 25010000002 HYDROMORPHONE PER 4 MG: Performed by: EMERGENCY MEDICINE

## 2019-10-01 PROCEDURE — 80053 COMPREHEN METABOLIC PANEL: CPT | Performed by: EMERGENCY MEDICINE

## 2019-10-01 RX ORDER — ONDANSETRON 2 MG/ML
4 INJECTION INTRAMUSCULAR; INTRAVENOUS ONCE
Status: COMPLETED | OUTPATIENT
Start: 2019-10-01 | End: 2019-10-01

## 2019-10-01 RX ORDER — SODIUM CHLORIDE 0.9 % (FLUSH) 0.9 %
10 SYRINGE (ML) INJECTION AS NEEDED
Status: DISCONTINUED | OUTPATIENT
Start: 2019-10-01 | End: 2019-10-01 | Stop reason: HOSPADM

## 2019-10-01 RX ORDER — HYDROMORPHONE HYDROCHLORIDE 1 MG/ML
0.5 INJECTION, SOLUTION INTRAMUSCULAR; INTRAVENOUS; SUBCUTANEOUS ONCE
Status: COMPLETED | OUTPATIENT
Start: 2019-10-01 | End: 2019-10-01

## 2019-10-01 RX ADMIN — SODIUM CHLORIDE 1000 ML: 9 INJECTION, SOLUTION INTRAVENOUS at 15:36

## 2019-10-01 RX ADMIN — IOPAMIDOL 85 ML: 612 INJECTION, SOLUTION INTRAVENOUS at 16:49

## 2019-10-01 RX ADMIN — ONDANSETRON 4 MG: 2 INJECTION INTRAMUSCULAR; INTRAVENOUS at 15:37

## 2019-10-01 RX ADMIN — LIDOCAINE HYDROCHLORIDE 150 MG: 10 INJECTION, SOLUTION INFILTRATION; PERINEURAL at 17:33

## 2019-10-01 RX ADMIN — HYDROMORPHONE HYDROCHLORIDE 0.5 MG: 1 INJECTION, SOLUTION INTRAMUSCULAR; INTRAVENOUS; SUBCUTANEOUS at 15:37

## 2019-10-01 NOTE — ED PROVIDER NOTES
EMERGENCY DEPARTMENT ENCOUNTER    CHIEF COMPLAINT  Chief Complaint: Back/Flank Pain   History given by: Patient   History limited by: none  Room Number: 14/14  PMD: Minesh Leone MD      HPI:  Pt is a 39 y.o. female with hx of PE and stage III colon CA who presents complaining of R flank and lower back pain that woke pt from nap at 1400, improves with flexion of legs. Pt denies numbness and weakness of R leg, confirms mild soreness of L leg and nausea. Pt also denies chest pain and SOA. Pt states symptoms are at same location as dx of multiple PEs she had when admitted to hospital 1 week ago, but pt denies pain with inhalation upon this event. Pt affirms she was discharged on Xarelto which she has complied with. Per pt, she was also recently dx with UTI due to dysuria, but had no relief of dysuria following abx. Pt states she finished 6 weeks of chemotherapy and radiation 6 days ago. Pt also affirms hx of nephrolithiasis. Pt denies any relief of pain with tylenol taken PTA at ED.     Duration:  More than 1 hours   Onset: gradual   Timing: constant   Location: R sided back/flank   Radiation: none   Quality: pain   Intensity/Severity: mild to moderate   Progression: unchanged   Associated Symptoms: LLE soreness, nausea, dysuria   Aggravating Factors: none   Alleviating Factors: flexion of legs  Previous Episodes: Pt was admitted 1 week ago for PE, pt has hx of nephrolithiasis.   Treatment before arrival: Pt has taken tylenol, without relief.     PAST MEDICAL HISTORY  Active Ambulatory Problems     Diagnosis Date Noted   • Anxiety 08/09/2016   • Irritable bowel syndrome 08/09/2016   • Immunization due 11/08/2018   • Monopolar depression (CMS/HCC) 05/09/2019   • Adenocarcinoma of rectum (CMS/HCC) 07/12/2019   • Family history of factor V Leiden mutation 08/01/2019   • Heterozygous factor V Leiden mutation (CMS/HCC) 08/02/2019   • Encounter for fitting and adjustment of vascular catheter 08/07/2019   • Functional  diarrhea 2019   • Adverse effect of antineoplastic and immunosuppressive drugs, initial encounter 2019   • Hypokalemia 2019   • Hypomagnesemia 2019   • Other pulmonary embolism without acute cor pulmonale (CMS/HCC) 2019   • UTI (urinary tract infection) 2019     Resolved Ambulatory Problems     Diagnosis Date Noted   • No Resolved Ambulatory Problems     Past Medical History:   Diagnosis Date   • Abnormal Pap smear of cervix 1998   • Anemia in pregnancy    • Anxiety    • Cervical lymphadenitis 2012   • Chronic diarrhea    • Colon cancer (CMS/HCC)    • Colon polyps    • Depression    • Factor V Leiden (CMS/HCC)    • GERD (gastroesophageal reflux disease)    • Hematochezia    • Hemorrhoids    • History of TB skin testing 2009   • HPV in female    • Infected insect bite of neck 2016   • Irritable bowel syndrome    • Kidney stones 2007   • Lumbar disc disease    • PIH (pregnancy induced hypertension)    • Rectal cancer (CMS/HCC) 2019   • Right leg pain    • SAB (spontaneous ) 1996   • Sepsis due to cellulitis (CMS/HCC) 2002       PAST SURGICAL HISTORY  Past Surgical History:   Procedure Laterality Date   • CHOLECYSTECTOMY N/A 10/10/2003    LAPAROSCOPIC WITH CHOLANGIOGRAM, DR. JAMEY TALAVERA AT [facility]   • COLONOSCOPY W/ POLYPECTOMY N/A 2019    15 MM TUBULOVILLOUS ADENOMA POLYP IN HEPATIC FLEXURE, 20 MMTUBULOVILLOUS ADENOMA WITH HIGH GRADE DYSPLASIA IN RECTOSIGMOID, 4 CM MASS IN MID RECTUM, PATH: INVASIVE MODERATELY DIFFERENTIATED ADENOCARCINOMA, DR. JENNIFER LI AT [facility]   • DILATATION AND EVACUATION N/A    • ENDOSCOPY N/A 2019    LA GRADE A ESOPHAGITIS, GASTRITIS, ALL BIOPSIES BENIGN, DR. JENNIFER LI AT [facility]   • PAP SMEAR N/A 2014   • SIGMOIDOSCOPY N/A 2019    INFILTRATIVE PARTIALLY OBSTRUCTING LARGE RECTAL CANCER, AREA TATOOED, DR. GERONIMO ESPARZA AT [facility]   •  TRANSRECTAL ULTRASOUND N/A 7/24/2019    Procedure: ULTRASOUND TRANSRECTAL;  Surgeon: Padmaja Ye MD;  Location: [facility] ENDOSCOPY;  Service: General   • VAGINAL DELIVERY N/A 09/18/1998   • VENOUS ACCESS DEVICE (PORT) INSERTION Left 8/9/2019    Procedure: INSERTION VENOUS ACCESS DEVICE;  Surgeon: Sj Joseph MD;  Location:  [facility];  Service: General   • WISDOM TOOTH EXTRACTION  1993       FAMILY HISTORY  Family History   Problem Relation Age of Onset   • Hyperlipidemia Mother    • Colon polyps Mother    • Heart disease Mother    • Hypertension Mother    • Factor V Leiden deficiency Mother    • Skin cancer Father         Squamous in 60s   • Heart disease Paternal Grandfather    • No Known Problems Son    • Cancer Paternal Uncle    • Colon cancer Paternal Uncle    • Diabetes Paternal Uncle    • Mental illness Sister         Addiction   • Colon cancer Maternal Uncle    • Malig Hyperthermia Neg Hx        SOCIAL HISTORY  Social History     Socioeconomic History   • Marital status:      Spouse name: Justus   • Number of children: 1   • Years of education: College   • Highest education level: Not on file   Occupational History   • Occupation:      Comment: [employer]     Employer: [employer]   Tobacco Use   • Smoking status: Current Every Day Smoker     Packs/day: 1.00     Years: 24.00     Pack years: 24.00     Types: Electronic Cigarette, Cigarettes   • Smokeless tobacco: Never Used   Substance and Sexual Activity   • Alcohol use: Yes     Comment: Rarely   • Drug use: No   • Sexual activity: Defer       ALLERGIES  Patient has no known allergies.    REVIEW OF SYSTEMS  Review of Systems   Constitutional: Positive for diaphoresis. Negative for fever.   HENT: Negative for sore throat.    Eyes: Negative.    Respiratory: Negative for cough and shortness of breath.    Cardiovascular: Negative for chest pain.   Gastrointestinal: Positive for nausea. Negative for abdominal pain, diarrhea  and vomiting.   Genitourinary: Positive for flank pain (R sided ). Negative for dysuria.   Musculoskeletal: Positive for back pain (R lower) and myalgias (LLE). Negative for neck pain.   Skin: Negative for rash.   Allergic/Immunologic: Negative.    Neurological: Negative for weakness, numbness and headaches.   Hematological: Negative.    Psychiatric/Behavioral: Negative.    All other systems reviewed and are negative.      PHYSICAL EXAM  ED Triage Vitals   Temp Heart Rate Resp BP SpO2   10/01/19 1443 10/01/19 1443 10/01/19 1443 10/01/19 1501 10/01/19 1443   96.9 °F (36.1 °C) 97 16 149/92 97 %      Temp src Heart Rate Source Patient Position BP Location FiO2 (%)   10/01/19 1443 10/01/19 1443 -- -- --   Tympanic Monitor          Physical Exam   Constitutional: She is oriented to person, place, and time. No distress.   HENT:   Head: Normocephalic and atraumatic.   Eyes: EOM are normal. Pupils are equal, round, and reactive to light.   Neck: Normal range of motion. Neck supple.   Cardiovascular: Normal rate, regular rhythm and normal heart sounds.   No murmur heard.  Pulmonary/Chest: Effort normal and breath sounds normal. No respiratory distress.   Abdominal: Soft. Bowel sounds are normal. She exhibits no distension. There is no tenderness. There is no rebound and no guarding.   Musculoskeletal: Normal range of motion. She exhibits no edema.   Neurological: She is alert and oriented to person, place, and time. She has normal sensation and normal strength. She has a normal Straight Leg Raise Test (to BLE).   Reflex Scores:       Patellar reflexes are 2+ on the right side and 2+ on the left side.  Skin: Skin is warm and dry. No rash noted.   Psychiatric: Mood and affect normal.   Nursing note and vitals reviewed.      LAB RESULTS  Lab Results (last 24 hours)     Procedure Component Value Units Date/Time    CBC & Differential [[id number]] Collected:  10/01/19 1536    Specimen:  Blood Updated:  10/01/19 1557    Narrative:        The following orders were created for panel order CBC & Differential.  Procedure                               Abnormality         Status                     ---------                               -----------         ------                     CBC Auto Differential[[id number]]        Abnormal            Final result                 Please view results for these tests on the individual orders.    Comprehensive Metabolic Panel [[id number]]  (Abnormal) Collected:  10/01/19 1536    Specimen:  Blood Updated:  10/01/19 1615     Glucose 93 mg/dL      BUN 12 mg/dL      Creatinine 1.09 mg/dL      Sodium 140 mmol/L      Potassium 4.7 mmol/L      Chloride 101 mmol/L      CO2 32.1 mmol/L      Calcium 8.9 mg/dL      Total Protein 6.1 g/dL      Albumin 3.80 g/dL      ALT (SGPT) 43 U/L      AST (SGOT) 25 U/L      Alkaline Phosphatase 88 U/L      Total Bilirubin 0.2 mg/dL      eGFR Non African Amer 56 mL/min/1.73      Globulin 2.3 gm/dL      A/G Ratio 1.7 g/dL      BUN/Creatinine Ratio 11.0     Anion Gap 6.9 mmol/L     Narrative:       GFR Normal >60  Chronic Kidney Disease <60  Kidney Failure <15    Protime-INR [[id number]]  (Abnormal) Collected:  10/01/19 1536    Specimen:  Blood Updated:  10/01/19 1609     Protime 14.3 Seconds      INR 1.14    Urinalysis With Microscopic If Indicated (No Culture) - Urine, Clean Catch [[id number]]  (Abnormal) Collected:  10/01/19 1536    Specimen:  Urine, Clean Catch Updated:  10/01/19 1623     Color, UA Yellow     Appearance, UA Clear     pH, UA 6.5     Specific Gravity, UA 1.017     Glucose, UA Negative     Ketones, UA Negative     Bilirubin, UA Negative     Blood, UA Large (3+)     Protein, UA 30 mg/dL (1+)     Leuk Esterase, UA Small (1+)     Nitrite, UA Negative     Urobilinogen, UA 1.0 E.U./dL    CBC Auto Differential [[id number]]  (Abnormal) Collected:  10/01/19 1536    Specimen:  Blood Updated:  10/01/19 1557     WBC 4.13 10*3/mm3      RBC 4.38 10*6/mm3      Hemoglobin 13.9 g/dL       Hematocrit 40.0 %      MCV 91.3 fL      MCH 31.7 pg      MCHC 34.8 g/dL      RDW 17.4 %      RDW-SD 56.0 fl      MPV 8.9 fL      Platelets 263 10*3/mm3      Neutrophil % 66.8 %      Lymphocyte % 13.8 %      Monocyte % 15.0 %      Eosinophil % 3.4 %      Basophil % 0.5 %      Immature Grans % 0.5 %      Neutrophils, Absolute 2.76 10*3/mm3      Lymphocytes, Absolute 0.57 10*3/mm3      Monocytes, Absolute 0.62 10*3/mm3      Eosinophils, Absolute 0.14 10*3/mm3      Basophils, Absolute 0.02 10*3/mm3      Immature Grans, Absolute 0.02 10*3/mm3      nRBC 0.0 /100 WBC     Urinalysis, Microscopic Only - Urine, Clean Catch [[id number]]  (Abnormal) Collected:  10/01/19 1536    Specimen:  Urine, Clean Catch Updated:  10/01/19 1623     RBC, UA Too Numerous to Count /HPF      WBC, UA 3-5 /HPF      Bacteria, UA None Seen /HPF      Squamous Epithelial Cells, UA 3-6 /HPF      Hyaline Casts, UA None Seen /LPF      Calcium Oxalate Crystals, UA Small/1+ /HPF      Methodology Manual Light Microscopy          I ordered the above labs and reviewed the results    RADIOLOGY  CT Abdomen Pelvis With Contrast   Final Result   Tiny 1 to 2 mm diameter obstructing calculus at the right   ureterovesical junction producing mild right hydroureter and   hydronephrosis. Mild to moderate perirenal edema is also evident on the   right. No other radiopaque calculi are identified.       This report was finalized on 10/1/2019 5:30 PM by Dr. Eusebio Curtis M.D.               I ordered the above noted radiological studies. Interpreted by radiologist. Discussed with radiologist (Dr. Curtis). Reviewed by me in PACS.       PROCEDURES  Procedures      PROGRESS AND CONSULTS     1519: Upon pt exam, discussed plan for lab workup and CT Abd/Pelvis in ED. Agreed to administer pain medication and antiemetics during workup.     1522: Labs and CT Abd/Pelvis ordered for further evaluation. Dilaudid and Zofran ordered for pain management.     1656: Pt rechecked and  resting, states she feels mildly improved but still has some pain. Discussed results of CT scan which showed evidence of R sided nephrolithiasis seen by me in ED, informed pt of plan for wait on official read of CT prior to disposition and better control pain in ED.     1657: Lidocaine ordered for pain control due to nephrolithiasis.     1738: Pt rechecked and resting comfortably, states she feels improved. DIscussed results of CT scan as prior reviewed. Informed pt of plan for further monitoring in ED to ensure pain improved prior to discharge with further pain medication and antiemetics for outpatient urology follow up.  Pt directed to maintain fluids and strain urine. Pt given strict RTER instructions. Pt understands and agrees with the plan, all questions answered.      MEDICAL DECISION MAKING  Results were reviewed/discussed with the patient and they were also made aware of online access. Pt also made aware that some labs, such as cultures, will not be resulted during ER visit and follow up with PMD is necessary.     MDM  Number of Diagnoses or Management Options     Amount and/or Complexity of Data Reviewed  Clinical lab tests: ordered and reviewed (UA: blood - 3+, RBC - too numerous to count, WBC- 3-5 )  Tests in the radiology section of CPT®: ordered and reviewed (CT - 2mm stone at R UVJ with mild hydro)  Discussion of test results with the performing providers: yes (Dr. Curtis (radiology) )           DIAGNOSIS  Final diagnoses:   Right ureteral stone       DISPOSITION  DISCHARGE    Patient discharged in stable condition.    Reviewed implications of results, diagnosis, meds, responsibility to follow up, warning signs and symptoms of possible worsening, potential complications and reasons to return to ER.    Patient/Family voiced understanding of above instructions.    Discussed plan for discharge, as there is no emergent indication for admission. Patient referred to primary care provider for BP management  due to today's BP. Pt/family is agreeable and understands need for follow up and repeat testing.  Pt is aware that discharge does not mean that nothing is wrong but it indicates no emergency is present that requires admission and they must continue care with follow-up as given below or physician of their choice.     FOLLOW-UP  Minesh Leone MD  [address]  [facility] [address]  [facility] [id number] [phone]      As needed    Sergio Barrera MD  [address] [place] [address] [place] [id number] [phone]      If symptoms worsen         Medication List      Changed    clonazePAM 0.5 MG tablet  Commonly known as:  KLONOPIN  Take 1 tablet by mouth 2 (Two) Times a Day As Needed for Seizures.  What changed:  when to take this     dicyclomine 20 MG tablet  Commonly known as:  BENTYL  Take 1 tablet by mouth Every 6 (Six) Hours.  What changed:    when to take this  reasons to take this              Latest Documented Vital Signs:  As of 12:07 AM  BP- 131/90 HR- 71 Temp- 96.9 °F (36.1 °C) (Tympanic) O2 sat- 97%    --  Documentation assistance provided by francisco j Mcpherson for Dr. Godinez.  Information recorded by the scribe was done at my direction and has been verified and validated by me.            Rosalie Mcpherson  10/01/19 1822       Isauro Godinez MD  10/02/19 0007

## 2019-10-01 NOTE — DISCHARGE INSTRUCTIONS
Strain your urine.  Take your home pain and nausea medicine as needed for either.  Return to the emergency department if symptoms worsen or you cannot control your pain with your home pain medicine.

## 2019-10-01 NOTE — ED NOTES
"Pt states \"I woke up from a nap with right lower back pain. I was just in here for stage 3 colon cancer and PEs.\"     Ami Jones, RN  10/01/19 2409    "

## 2019-10-06 ENCOUNTER — READMISSION MANAGEMENT (OUTPATIENT)
Dept: CALL CENTER | Facility: HOSPITAL | Age: 39
End: 2019-10-06

## 2019-10-06 NOTE — OUTREACH NOTE
Medical Week 2 Survey      Responses   Facility patient discharged from?  [place]   Does the patient have one of the following disease processes/diagnoses(primary or secondary)?  Other   Week 2 attempt successful?  Yes   Call start time  1717   Call end time  1718   Meds reviewed with patient/caregiver?  Yes   Is the patient taking all medications as directed (includes completed medication regime)?  Yes   Has the patient kept scheduled appointments due by today?  Yes   What is the patient's perception of their health status since discharge?  Improving   Week 2 Call Completed?  Yes   Wrap up additional comments  doing well, will have another follow up appt.  tomorrow          Edelmira Marte RN

## 2019-10-07 ENCOUNTER — INFUSION (OUTPATIENT)
Dept: ONCOLOGY | Facility: HOSPITAL | Age: 39
End: 2019-10-07

## 2019-10-07 ENCOUNTER — OFFICE VISIT (OUTPATIENT)
Dept: ONCOLOGY | Facility: CLINIC | Age: 39
End: 2019-10-07

## 2019-10-07 VITALS
SYSTOLIC BLOOD PRESSURE: 114 MMHG | WEIGHT: 224 LBS | TEMPERATURE: 98.4 F | HEART RATE: 80 BPM | RESPIRATION RATE: 16 BRPM | OXYGEN SATURATION: 96 % | BODY MASS INDEX: 37.32 KG/M2 | HEIGHT: 65 IN | DIASTOLIC BLOOD PRESSURE: 88 MMHG

## 2019-10-07 DIAGNOSIS — N20.0 KIDNEY STONE ON RIGHT SIDE: Primary | ICD-10-CM

## 2019-10-07 DIAGNOSIS — D68.51 HETEROZYGOUS FACTOR V LEIDEN MUTATION (HCC): ICD-10-CM

## 2019-10-07 DIAGNOSIS — C20 ADENOCARCINOMA OF RECTUM (HCC): ICD-10-CM

## 2019-10-07 DIAGNOSIS — I26.99 OTHER ACUTE PULMONARY EMBOLISM WITHOUT ACUTE COR PULMONALE (HCC): ICD-10-CM

## 2019-10-07 DIAGNOSIS — Z45.2 ENCOUNTER FOR FITTING AND ADJUSTMENT OF VASCULAR CATHETER: Primary | ICD-10-CM

## 2019-10-07 LAB
BASOPHILS # BLD AUTO: 0.02 10*3/MM3 (ref 0–0.2)
BASOPHILS NFR BLD AUTO: 0.3 % (ref 0–1.5)
DEPRECATED RDW RBC AUTO: 59.7 FL (ref 37–54)
EOSINOPHIL # BLD AUTO: 0.16 10*3/MM3 (ref 0–0.4)
EOSINOPHIL NFR BLD AUTO: 2.6 % (ref 0.3–6.2)
ERYTHROCYTE [DISTWIDTH] IN BLOOD BY AUTOMATED COUNT: 17.3 % (ref 12.3–15.4)
HCT VFR BLD AUTO: 40.2 % (ref 34–46.6)
HGB BLD-MCNC: 13.1 G/DL (ref 12–15.9)
IMM GRANULOCYTES # BLD AUTO: 0.02 10*3/MM3 (ref 0–0.05)
IMM GRANULOCYTES NFR BLD AUTO: 0.3 % (ref 0–0.5)
LYMPHOCYTES # BLD AUTO: 0.59 10*3/MM3 (ref 0.7–3.1)
LYMPHOCYTES NFR BLD AUTO: 9.6 % (ref 19.6–45.3)
MCH RBC QN AUTO: 30.9 PG (ref 26.6–33)
MCHC RBC AUTO-ENTMCNC: 32.6 G/DL (ref 31.5–35.7)
MCV RBC AUTO: 94.8 FL (ref 79–97)
MONOCYTES # BLD AUTO: 0.48 10*3/MM3 (ref 0.1–0.9)
MONOCYTES NFR BLD AUTO: 7.8 % (ref 5–12)
NEUTROPHILS # BLD AUTO: 4.9 10*3/MM3 (ref 1.7–7)
NEUTROPHILS NFR BLD AUTO: 79.4 % (ref 42.7–76)
NRBC BLD AUTO-RTO: 0 /100 WBC (ref 0–0.2)
PLATELET # BLD AUTO: 301 10*3/MM3 (ref 140–450)
PMV BLD AUTO: 8.4 FL (ref 6–12)
RBC # BLD AUTO: 4.24 10*6/MM3 (ref 3.77–5.28)
WBC NRBC COR # BLD: 6.17 10*3/MM3 (ref 3.4–10.8)

## 2019-10-07 PROCEDURE — 96523 IRRIG DRUG DELIVERY DEVICE: CPT | Performed by: INTERNAL MEDICINE

## 2019-10-07 PROCEDURE — 99215 OFFICE O/P EST HI 40 MIN: CPT | Performed by: INTERNAL MEDICINE

## 2019-10-07 PROCEDURE — 85025 COMPLETE CBC W/AUTO DIFF WBC: CPT | Performed by: INTERNAL MEDICINE

## 2019-10-07 RX ORDER — SODIUM CHLORIDE 0.9 % (FLUSH) 0.9 %
10 SYRINGE (ML) INJECTION AS NEEDED
Status: DISCONTINUED | OUTPATIENT
Start: 2019-10-07 | End: 2019-10-07 | Stop reason: HOSPADM

## 2019-10-07 RX ORDER — SODIUM CHLORIDE 0.9 % (FLUSH) 0.9 %
10 SYRINGE (ML) INJECTION AS NEEDED
Status: CANCELLED | OUTPATIENT
Start: 2019-10-07

## 2019-10-07 RX ADMIN — Medication 10 ML: at 09:05

## 2019-10-07 RX ADMIN — SODIUM CHLORIDE, PRESERVATIVE FREE 500 UNITS: 5 INJECTION INTRAVENOUS at 09:06

## 2019-10-07 NOTE — PROGRESS NOTES
REASON FOR FOLLOW UP:     1. Newly diagnosed rectal cancer, rectal ultrasound examination reported T3N0 disease without lymphadenopathy. She does have small pulmonary nodule 6-7 mm and 2 mm with indeterminate feature. There is no solid evidence of metastatic disease otherwise. Patient has stage IIA (T3N0M0) disease.  2. The patient is heterozygous factor V Leiden, currently on prophylactic anticoagulation with Lovenox 40 mg daily given her increased risk of thrombosis with MediPort and GI malignancy.   3.  The 8 mm hypermetabolic right upper lobe pulmonary nodule is suspicious for metastatic as well.    4.  Patient had a PowerPort placement on the left upper chest by Dr. Joseph on 8/9/2019.  5.  Patient was started on concurrent infusional 5-FU chemoradiation therapy on 8/12/2019, with planned complete surgical resection and further adjuvant chemotherapy with intention to cure the disease.   6. Diarrhea related to therapy and radiation.     HISTORY OF PRESENT ILLNESS:  The patient is a 39 y.o. year old female here today for reevaluation.  She had a recent hospitalization in late September 2019 because of newly discovered pulmonary emboli.  She was on prophylactic Lovenox prior to the incident of PE.  Now she is on full dose Xarelto anticoagulation.  Patient reports no further chest pain dyspnea.  She denies bleeding or bruising.    During her hospitalization, she was seen by Dr. Ye, who plans to have surgery 8 to 12 weeks after finishing radiation therapy.  She finished her radiation on 9/19/2019.     I noticed patient also presented to the emergency room on 10/1/2019 complaining of right flank area pain.  I reviewed the images studies and indeed she has a very small 1 to 2 mm obstructing kidney stone in the UV junction.  Patient is still symptomatic with some pains and dysuria.  She denies fever sweating or chills.    She continues to have some intermittent nausea and the diarrhea.  Overall has been  better.  Her performance that is ECOG 1.  She still works full-time in physician office.    Laboratory study today reported normal CBC including hemoglobin 13.1, platelets 301,000, WBC 6170 and ANC 4900 lymphocytes 590 monocytes 480.          Past Medical History:   Diagnosis Date   • Abnormal Pap smear of cervix 1998    JULIUS I   • Anemia in pregnancy    • Anxiety    • Cervical lymphadenitis 2012    SEEN AT [facility] ER   • Chronic diarrhea    • Colon cancer (CMS/HCC)     Stage III   ( STARTS RADIATION AND CHEMO ON 19 )   • Colon polyps    • Depression    • Factor V Leiden (CMS/HCC)     DX 2019   • GERD (gastroesophageal reflux disease)    • Hematochezia    • Hemorrhoids    • History of TB skin testing 2009    TB Skin Test   • HPV in female    • Infected insect bite of neck 2016    SEEN AT [facility]   • Irritable bowel syndrome    • Kidney stones 2007    SEEN AT [facility] ER   • Lumbar disc disease    • PIH (pregnancy induced hypertension)    • Rectal cancer (CMS/HCC) 2019    INVASIVE MODERATELY DIFFERENTIATED ADENOCARCINOMA   • Right leg pain    • SAB (spontaneous ) 1996     A2-1 INDUCED   • Sepsis due to cellulitis (CMS/HCC) 2002    LEFT GREAT TOE, ADMITTED TO [facility]     Past Surgical History:   Procedure Laterality Date   • CHOLECYSTECTOMY N/A 10/10/2003    LAPAROSCOPIC WITH CHOLANGIOGRAM, DR. JAMEY TALAVERA AT [facility]   • COLONOSCOPY W/ POLYPECTOMY N/A 2019    15 MM TUBULOVILLOUS ADENOMA POLYP IN HEPATIC FLEXURE, 20 MMTUBULOVILLOUS ADENOMA WITH HIGH GRADE DYSPLASIA IN RECTOSIGMOID, 4 CM MASS IN MID RECTUM, PATH: INVASIVE MODERATELY DIFFERENTIATED ADENOCARCINOMA, DR. JENNIFER LI AT [facility]   • DILATATION AND EVACUATION N/A    • ENDOSCOPY N/A 2019    LA GRADE A ESOPHAGITIS, GASTRITIS, ALL BIOPSIES BENIGN, DR. JENNIFER LI AT [facility]   • PAP SMEAR N/A 2014   • SIGMOIDOSCOPY N/A 2019    INFILTRATIVE  PARTIALLY OBSTRUCTING LARGE RECTAL CANCER, AREA TATOOED, DR. PADMAJA ESPARZA AT [facility]   • TRANSRECTAL ULTRASOUND N/A 7/24/2019    Procedure: ULTRASOUND TRANSRECTAL;  Surgeon: Padmaja Esparza MD;  Location: [facility] ENDOSCOPY;  Service: General   • VAGINAL DELIVERY N/A 09/18/1998   • VENOUS ACCESS DEVICE (PORT) INSERTION Left 8/9/2019    Procedure: INSERTION VENOUS ACCESS DEVICE;  Surgeon: Sj Joseph MD;  Location: [facility] OR [facility];  Service: General   • WISDOM TOOTH EXTRACTION  1993       HEMATOLOGIC/ONCOLOGIC HISTORY:      The patient reports she has intermittent rectal bleeding and urgency, mucous with her stool, starting sometime in 2016. At that time she was referred to GI service, and was diagnosed of irritable bowel syndrome. Nevertheless she had worsening urgency for bowel movement, and had a couple of incidents losing control of stool. She was recently seen by Roland Thorpe MD again and had colonoscopy and EGD exam on 07/08/2019. She was found having a circumferential rectal mass. According to the procedure note, the patient had a fungating circumferential bleeding 4 cm mass in the middle rectum, at distance between 13 cm and 17 cm from the anus. Mass was causing partial obstruction. There were also colon polyps found at the hepatic flexure and also at the rectosigmoid junction 23 cm from the anus. Both were resected and retrieved. EGD examination reported grade A esophagitis at the GE junction and patchy discontinuous erythema and aggravation of the mucosa without bleeding in the stomach body. There is normal mucosa of the duodenum. Pathology evaluation from this procedure reported moderately differentiated adenocarcinoma involving the rectal mass. The rectal sigmoid junction polyp was tubular/tubulovillous adenoma with high grade dysplasia negative for invasive malignancy. The hepatic flexure polyp was also tubular/tubulovillous adenoma negative for high grade dysplasia or malignancy. The biopsy from the  esophagus reports squamocolumnar mucosa with inflammatory changes suggestive of mild reflux esophagitis, negative for interstitial metaplasia. Gastric biopsy was benign and duodenal biopsy was also benign. There is no mention of H-pylori.     Patient was subsequently referred to colorectal surgeon Padmaja Ye MD for further evaluation. The patient had CT scan examination for chest, abdomen and pelvis requested by Dr. Ye and were done on 07/13/2019. The study reported no evidence of lymphadenopathy in the abdomen and pelvis. There was wall thickening of the rectosigmoid junction. Normal spleen, pancreas, adrenal glands and kidneys. There was fatty liver infiltration but no focal lymphatic lesions. There was a small 6-7 mm noncalcified nodule in the right upper lobe and a tiny 3 mm subpleural nodule in the right middle lobe. No mediastinal or hilar lymphadenopathy.     Dr. Ye performed staging rectal ultrasound examination. This study reported tumor penetrating through the muscularis propria as T3 disease; there were no lymph nodes identified.      MEDICATIONS    Current Outpatient Medications:   •  clonazePAM (KLONOPIN) 0.5 MG tablet, Take 1 tablet by mouth 2 (Two) Times a Day As Needed for Seizures. (Patient taking differently: Take 0.5 mg by mouth 1 (One) Time As Needed for Seizures.), Disp: 60 tablet, Rfl: 5  •  dicyclomine (BENTYL) 20 MG tablet, Take 1 tablet by mouth Every 6 (Six) Hours. (Patient taking differently: Take 20 mg by mouth As Needed.), Disp: 120 tablet, Rfl: 6  •  escitalopram (LEXAPRO) 20 MG tablet, Take 1 tablet by mouth Daily., Disp: 90 tablet, Rfl: 3  •  famotidine (PEPCID) 20 MG tablet, Take 1 tablet by mouth 2 (Two) Times a Day., Disp: 60 tablet, Rfl: 0  •  HYDROcodone-acetaminophen (NORCO) 5-325 MG per tablet, 1-2 by mouth every four hours as needed for pain, Disp: 30 tablet, Rfl: 0  •  hyoscyamine (ANASPAZ,LEVSIN) 0.125 MG tablet, Take 1 tablet by mouth Every 6 (Six) Hours As Needed  for Cramping., Disp: 30 tablet, Rfl: 2  •  loperamide (IMODIUM) 2 MG capsule, Take 2 mg by mouth 4 (Four) Times a Day As Needed for Diarrhea., Disp: , Rfl:   •  ondansetron (ZOFRAN) 8 MG tablet, Take 1 tablet by mouth 3 (Three) Times a Day As Needed for Nausea or Vomiting., Disp: 30 tablet, Rfl: 5  •  oxyCODONE-acetaminophen (PERCOCET) 5-325 MG per tablet, Take 1 tablet by mouth Every 6 (Six) Hours As Needed for Moderate Pain ., Disp: 30 tablet, Rfl: 0  •  phenazopyridine (AZO DINE) 95 MG tablet, Take 95 mg by mouth 3 (Three) Times a Day As Needed for bladder spasms., Disp: , Rfl:   •  potassium chloride (K-DUR,KLOR-CON) 20 MEQ CR tablet, Take 1 tablet by mouth Daily., Disp: 30 tablet, Rfl: 3  •  pramoxine (PROCTOFOAM) 1 % foam, Insert  into the rectum Every 2 (Two) Hours As Needed for Hemorrhoids., Disp: 15 g, Rfl: 2  •  rivaroxaban (XARELTO) 20 MG tablet, Take 1 tablet by mouth Daily. Start after finishing starter pack., Disp: 30 tablet, Rfl: 2  •  Rivaroxaban (XARELTO) tablet therapy pack starter pack, Take one 15 mg tablet twice daily with food for 21 days.  Followed by one 20 mg tablet by mouth once daily with food., Disp: 51 tablet, Rfl: 0  •  silver sulfadiazine (SILVADENE, SSD) 1 % cream, Apply  topically to the appropriate area as directed 2 (Two) Times a Day., Disp: 50 g, Rfl: 1    ALLERGIES:   No Known Allergies    SOCIAL HISTORY:       Social History     Socioeconomic History   • Marital status:      Spouse name: Justus   • Number of children: 1   • Years of education: Not on file   • Highest education level:  College   Occupational History     Employer: [employer]   Tobacco Use   • Smoking status: Current Every Day Smoker     Packs/day: 1.50     Years: 20.00     Pack years: 30.00     Types: Electronic Cigarette   • Smokeless tobacco: Never Used   Substance and Sexual Activity   • Alcohol use: Yes     Comment: Rarely   • Drug use: No   • Sexual activity: Defer         FAMILY HISTORY:   Mother  has positive factor V Leiden mutation, although she did not have thrombosis, mother also is coronary disease with stenting, she also is occasional bruising.  Maternal grandmother had DVT, she had multiple surgical procedures.  Patient mother's half-brother had metastatic colon cancer at diagnosis in his 50s.  Maternal great aunt has breast cancer.  Patient will follow his skin cancer in his 60s, exclusive.    REVIEW OF SYSTEMS:  Review of Systems   Constitutional: Positive for unexpected weight change (See HPI ). Negative for appetite change, chills, diaphoresis, fatigue and fever.   HENT: Negative for congestion, hearing loss, mouth sores, rhinorrhea, sore throat and trouble swallowing.    Eyes: Negative for visual disturbance.   Respiratory: Negative for cough, chest tightness, shortness of breath and wheezing.    Cardiovascular: Negative for chest pain, palpitations and leg swelling.   Gastrointestinal: Positive for diarrhea (See HPI ) and nausea. Negative for abdominal distention, abdominal pain, anal bleeding, constipation and vomiting.        See HPI    Endocrine: Negative for cold intolerance.   Genitourinary: Negative for dysuria, frequency, hematuria and urgency.   Musculoskeletal: Negative for arthralgias, back pain and joint swelling.   Skin: Negative.  Negative for rash and wound.   Allergic/Immunologic: Negative for immunocompromised state.   Neurological: Negative for dizziness, seizures, syncope, speech difficulty, weakness, numbness and headaches.   Hematological: Negative for adenopathy. Bruises/bleeds easily (see HPI ).   Psychiatric/Behavioral: Negative for agitation, behavioral problems, confusion and suicidal ideas.          There were no vitals filed for this visit.  Current Status 9/16/2019   ECOG score 0      PHYSICAL EXAM:    GENERAL:  Well-developed, well-nourished  female in no acute distress.   SKIN:  Warm, dry without rashes, purpura or petechiae.  EYES:  Pupils equal, round and  reactive to light.  EOMs intact.  Conjunctivae normal.  EARS:  Hearing intact.  NOSE:  No nasal discharge.  MOUTH:  Tongue is well-papillated; no stomatitis or ulcers.  Lips normal.  THROAT:  Oropharynx without lesions or exudates.  NECK:  Supple with good range of motion; no thyromegaly or masses.  LYMPHATICS:  No cervical, supraclavicular adenopathy.  CHEST:  Lungs clear to auscultation. Good airflow.  CARDIAC:  Regular rate and rhythm without murmurs, rubs or gallops. Normal S1,S2.  ABDOMEN:  Soft, nontender with no hepatosplenomegaly or masses. Bowel sounds normal.  EXTREMITIES:  No clubbing, cyanosis or edema.  NEUROLOGICAL:  Cranial Nerves II-XII grossly intact.  No focal neurological deficits.  PSYCHIATRIC:  Normal affect and mood.        RECENT LABS:  Lab Results   Component Value Date    WBC 4.13 10/01/2019    HGB 13.9 10/01/2019    HCT 40.0 10/01/2019    MCV 91.3 10/01/2019     10/01/2019     Lab Results   Component Value Date    NEUTROABS 2.76 10/01/2019     Lab Results   Component Value Date    CEA 18.21 08/01/2019     Glucose   Date Value Ref Range Status   10/01/2019 93 65 - 99 mg/dL Final     BUN   Date Value Ref Range Status   10/01/2019 12 6 - 20 mg/dL Final     Creatinine   Date Value Ref Range Status   10/01/2019 1.09 (H) 0.57 - 1.00 mg/dL Final     Sodium   Date Value Ref Range Status   10/01/2019 140 136 - 145 mmol/L Final     Potassium   Date Value Ref Range Status   10/01/2019 4.7 3.5 - 5.2 mmol/L Final     Chloride   Date Value Ref Range Status   10/01/2019 101 98 - 107 mmol/L Final     CO2   Date Value Ref Range Status   10/01/2019 32.1 (H) 22.0 - 29.0 mmol/L Final     Calcium   Date Value Ref Range Status   10/01/2019 8.9 8.6 - 10.5 mg/dL Final     Total Protein   Date Value Ref Range Status   10/01/2019 6.1 6.0 - 8.5 g/dL Final     Albumin   Date Value Ref Range Status   10/01/2019 3.80 3.50 - 5.20 g/dL Final     ALT (SGPT)   Date Value Ref Range Status   10/01/2019 43 (H) 1 - 33  U/L Final     AST (SGOT)   Date Value Ref Range Status   10/01/2019 25 1 - 32 U/L Final     Alkaline Phosphatase   Date Value Ref Range Status   10/01/2019 88 39 - 117 U/L Final     Total Bilirubin   Date Value Ref Range Status   10/01/2019 0.2 0.2 - 1.2 mg/dL Final     eGFR Non  Amer   Date Value Ref Range Status   10/01/2019 56 (L) >60 mL/min/1.73 Final     BUN/Creatinine Ratio   Date Value Ref Range Status   10/01/2019 11.0 7.0 - 25.0 Final     Anion Gap   Date Value Ref Range Status   10/01/2019 6.9 5.0 - 15.0 mmol/L Final           RECENT IMAGING    1.  CT ANGIOGRAM CHEST WITH IV CONTRAST 9/20/2019     HISTORY: 39-year-old female with right upper back pain and right-sided  chest pain. History of rectal cancer.     TECHNIQUE: CT angiogram chest includes axial imaging from the thoracic  inlet to the upper abdomen with IV contrast and this data was  reconstructed in coronal and sagittal planes and 3-dimensional volume  rendering was performed.     COMPARISON: PET/CT 08/08/2019, chest CT 07/19/2019     FINDINGS: There is a focal filling defect within segmental right lower  lobe branch extending to the right lower lobe posterior segment and  medial segment.. There is also a filling defect within the right upper  lobe segmental artery extending to the anterior segment. No central  pulmonary embolus is evident.     Left subclavian Mediport tip extends into the SVC. There is no pleural  or pericardial effusion. A 6 mm to 7 mm anterior right upper lobe  pulmonary nodule is without change. There is mild right lower lobe  atelectasis. Incidental note of bony bridging between right  posteromedial 4th and 5th ribs.     IMPRESSION:  1. Exam is positive for pulmonary thromboembolic disease with pulmonary  emboli within right upper lobe and right lower lobe segmental branches.  2. 6 mm to 7 mm right upper lobe pulmonary nodule without change.     Discussed with Dr. Ruiz in the emergency department 09/20/2019 at  4:45  PM.     2.  CT ABDOMEN PELVIS W CONTRAST-10/1/2019     CLINICAL HISTORY: Right flank pain. Back pain. History of kidney stones.  History of colon cancer.     TECHNIQUE: Spiral CT images were acquired through the abdomen and pelvis  with IV contrast only and were reconstructed in 3 mm thick slices.     Radiation dose reduction techniques were utilized, including automated  exposure control and exposure modulation based on body size.     COMPARISON: CT imaging of the abdomen and pelvis dated 07/19/2019.     FINDINGS: There is tiny 1 to 2 mm diameter obstructing calculus at the  right ureterovesical junction that is producing mild right hydroureter  and hydronephrosis. The small calculus was in the interpolar region of  the right kidney on the previous CT scan. No other radiopaque calculi  are identified. There is moderate perirenal edema on the right. The  liver, spleen, pancreas, and adrenal glands appear within normal limits.  The gallbladder is absent. The stomach and small and large bowel are  unremarkable. The uterus and ovaries appear within normal limits.     IMPRESSION:  Tiny 1 to 2 mm diameter obstructing calculus at the right  ureterovesical junction producing mild right hydroureter and  hydronephrosis. Mild to moderate perirenal edema is also evident on the  right. No other radiopaque calculi are identified.        Assessment/Plan    1.  Newly diagnosed rectal cancer, rectal ultrasound examination reported T3N0 disease without lymphadenopathy. CT scan of chest, abdomen and pelvis reported no lymphadenopathy in the abdomen, pelvis or chest. She does have small pulmonary nodule 6-7 mm and 2 mm with indeterminate feature. There is no solid evidence of metastatic disease otherwise.   · Based on the CT scan and rectal ultrasound imaging studies, this patient had stage IIA (T3N0M0) disease.    · She had PET scan examination on 8/8/2019 which reported a hypermetabolic right upper lobe pulmonary nodule 6 mm  with SUV 5.6.  This is suspicious for metastatic disease.  This patient may have stage IV disease.   · He initiated concurrent radiation with continuous 5-FU on 8/12/2019.  · Patient finished concurrent chemoradiation therapy.  She is waiting for surgical resection by Dr. Ye.  The procedure is postponed due to her recent newly developed pulmonary emboli.    2 .  Newly developed pulmonary emboli with a background of positive factor V Leiden mutation   · The patient is heterozygous factor V Leiden mutation and therefore was on low-dose anticoagulation with Lovenox, 40 mg daily given her increased risk of thrombosis with MediPort and GI malignancy.    · Nevertheless this patient was found to having pulmonary emboli on 9/20/2019 despite low-dose Lovenox.  She had a brief hospitalization in late September 2019, she was started on full dose Xarelto anticoagulation per protocol.  She is tolerating well, will continue same treatment.     3.  Kidney stone with mild right hydronephrosis.  Patient presented to the emergency room on 10/1/2019 complaining of right flank pain.  CT scan examination reported a small 1 to 2 mm obstructing calculus of the right ureter UV junction.  Patient continues to have some symptomology.  I discussed with patient will refer her to urology for further evaluation.    4. Loose, frequent stools:   · Patient now has adequate management of diarrhea during the day and avoids foods that may cause exacerbations.  At night, she has a harder time controlling her bowels and does get up multiple times for a bowel movement.  She has sometimes when she does not make it to the bathroom in time.   · This seems stable.  Continue to monitor.    5. Pelvic pain: Radiation-related. Tylenol not to exceed 3000 mg daily. The patient continues on Hyoscyamine for relief.  Stable at this point.  We will continue to monitor.    6. Hypokalemia: SHe is currently taking 20 mg of potassium every other day.    · Her potassium  was 4.7 on 10/1/2019.       7.  This patient has also strong family history for malignancy the patient had appointment with genetic counseling on September 3, 2019.    8.  Nausea: This does seem to be multifactorial with a large part due to anxiety.  The patient does continue on Lexapro at 20 mg daily as well as Klonopin 0.5 mg once daily.  She is able to take this up to 2 times daily as needed and will try this over this next week.    · Patient has improved symptomology.  Continue current management.      9.  Nurse reported malfunction of the portacatheter.  Saline can be injected, however no blood withdrawal.  Suspect malfunction will have a dye study as soon as possible.     PLAN:  1. Portacatheter dye study today.   2. Refer to urology service for obstructive kidney stone.  3. She will continue Xarelto for full dose anticoagulation.  I E scribed to her pharmacy with new prescription..  4. She will continue her current regimen of antidiarrheal and increase her nutrition using protein shakes.  5. Pepcid 20 mg twice daily.  6. Patient will continue follow-up with Dr. Ye for evaluation of surgery, 80 to 12 weeks after finishing radiation therapy according to Dr. Ye.    7. We will arrange patient come back to see me 11/5/2019 for reevaluation.  Expecting she would finish resection of rectal cancer.    8. I have asked her to call our office with any concerns prior to her next office visit. She has v/u      ALEXANDRU HUNT M.D., Ph.D.         CC:   MD Dr. Leyda Rizo MD Dr. Carole Scharf, MD Dr. Neil Prendergast, MD

## 2019-10-08 ENCOUNTER — HOSPITAL ENCOUNTER (OUTPATIENT)
Dept: GENERAL RADIOLOGY | Facility: HOSPITAL | Age: 39
Discharge: HOME | End: 2019-10-08
Admitting: RADIOLOGY

## 2019-10-08 DIAGNOSIS — Z45.2 ENCOUNTER FOR FITTING AND ADJUSTMENT OF VASCULAR CATHETER: ICD-10-CM

## 2019-10-08 DIAGNOSIS — C20 ADENOCARCINOMA OF RECTUM (HCC): ICD-10-CM

## 2019-10-08 PROCEDURE — 0 IOPAMIDOL PER 1 ML: Performed by: INTERNAL MEDICINE

## 2019-10-08 PROCEDURE — 36598 INJ W/FLUOR EVAL CV DEVICE: CPT

## 2019-10-08 RX ADMIN — IOPAMIDOL 12 ML: 755 INJECTION, SOLUTION INTRAVENOUS at 09:24

## 2019-10-09 RX ORDER — FAMOTIDINE 20 MG/1
TABLET, FILM COATED ORAL
Qty: 60 TABLET | Refills: 2 | Status: SHIPPED | OUTPATIENT
Start: 2019-10-09 | End: 2019-11-25

## 2019-10-15 PROBLEM — Q62.11 HYDRONEPHROSIS WITH URETEROPELVIC JUNCTION (UPJ) OBSTRUCTION: Status: ACTIVE | Noted: 2019-10-15

## 2019-10-18 ENCOUNTER — OFFICE VISIT (OUTPATIENT)
Dept: RADIATION ONCOLOGY | Facility: HOSPITAL | Age: 39
End: 2019-10-18

## 2019-10-18 VITALS
HEART RATE: 89 BPM | SYSTOLIC BLOOD PRESSURE: 132 MMHG | BODY MASS INDEX: 37.37 KG/M2 | DIASTOLIC BLOOD PRESSURE: 86 MMHG | WEIGHT: 227 LBS | OXYGEN SATURATION: 97 %

## 2019-10-18 DIAGNOSIS — C20 ADENOCARCINOMA OF RECTUM (HCC): Primary | ICD-10-CM

## 2019-10-18 PROCEDURE — 99024 POSTOP FOLLOW-UP VISIT: CPT | Performed by: RADIOLOGY

## 2019-10-21 ENCOUNTER — HOSPITAL ENCOUNTER (OUTPATIENT)
Dept: CT IMAGING | Facility: HOSPITAL | Age: 39
Discharge: HOME | End: 2019-10-21
Admitting: RADIOLOGY

## 2019-10-21 DIAGNOSIS — C20 ADENOCARCINOMA OF RECTUM (HCC): ICD-10-CM

## 2019-10-21 PROCEDURE — 74177 CT ABD & PELVIS W/CONTRAST: CPT

## 2019-10-21 PROCEDURE — 25010000002 IOPAMIDOL 61 % SOLUTION: Performed by: RADIOLOGY

## 2019-10-21 PROCEDURE — 71260 CT THORAX DX C+: CPT

## 2019-10-21 RX ADMIN — IOPAMIDOL 85 ML: 612 INJECTION, SOLUTION INTRAVENOUS at 07:20

## 2019-10-23 NOTE — PROGRESS NOTES
DIAGNOSIS and REASON FOR FOLLOW UP:   1. Adenocarcinoma of rectum (CMS/HCC)    Stage IIA (cT3, cN0, cM0)    Patient Care Team:  Minesh Leone MD as PCP - General (Family Medicine)  Padmaja Ye MD as Referring Physician (Colon and Rectal Surgery)  Beatrice Lau MD as Consulting Physician (Radiation Oncology)  Dong Nguyen MD PhD as Consulting Physician (Hematology and Oncology)  Wendy Cottrell MD as Consulting Physician (Obstetrics and Gynecology)  Roland Thorpe MD as Consulting Physician (Gastroenterology)    CHIEF COMPLAINT:  Post-radiation follow up  I had the pleasure of seeing Carole Weaver  back in the department today, now approximately 4 weeks out from the radiation therapy portion of her treatment for the above mentioned diagnosis.    Clinically she is doing wonderfully well.  Her rectal symptoms have subsided. She ate rich food over the weekend and had some diarrhea and cramping but we talked thru that episode. She is feeling well and ready for her next evaluations.  She did require a visit to the emergency room on October 1 for what appears to be a kidney stone.  She has not had that removed and has not passed.  She continues follow-up with urology in that regard.    Performance Status:  (1) Restricted in physically strenuous activity, ambulatory and able to do work of light nature    Past Medical History: she  has a past medical history of Abnormal Pap smear of cervix (02/02/1998), Anemia in pregnancy, Anxiety, Cervical lymphadenitis (06/06/2012), Chronic diarrhea, Colon cancer (CMS/HCC), Colon polyps, Depression, Factor V Leiden (CMS/[facility]), GERD (gastroesophageal reflux disease), Hematochezia, Hemorrhoids, History of TB skin testing (01/17/2009), HPV in female (1998), Infected insect bite of neck (05/27/2016), Irritable bowel syndrome, Kidney stone, Kidney stones (08/09/2007), Lumbar disc disease, PIH (pregnancy induced hypertension) (1998), Pulmonary embolism (CMS/[facility]), Rectal  cancer (CMS/[facility]) (2019), Right leg pain, SAB (spontaneous ) (1996), and Sepsis due to cellulitis (CMS/[facility]) (2002).    Past Surgical History:  she has a past surgical history that includes Pap Smear (N/A, 2014); Esophagogastroduodenoscopy (N/A, 2019); Colonoscopy w/ polypectomy (N/A, 2019); Dilation and evacuation (N/A, ); Cholecystectomy (N/A, 10/10/2003); Vaginal delivery (N/A, 1998); transrectal ultrasound (N/A, 2019); Sigmoidoscopy (N/A, 2019); [place] tooth extraction (); and Venous Access Device (Port) (Left, 2019).    Meds:    Current Outpatient Medications:   •  clonazePAM (KLONOPIN) 0.5 MG tablet, Take 1 tablet by mouth 2 (Two) Times a Day As Needed for Seizures. (Patient taking differently: Take 0.5 mg by mouth 1 (One) Time As Needed for Seizures.), Disp: 60 tablet, Rfl: 5  •  dicyclomine (BENTYL) 20 MG tablet, Take 1 tablet by mouth Every 6 (Six) Hours. (Patient taking differently: Take 20 mg by mouth As Needed.), Disp: 120 tablet, Rfl: 6  •  escitalopram (LEXAPRO) 20 MG tablet, Take 1 tablet by mouth Daily., Disp: 90 tablet, Rfl: 3  •  famotidine (PEPCID) 20 MG tablet, TAKE 1 TABLET BY MOUTH TWICE A DAY, Disp: 60 tablet, Rfl: 2  •  hyoscyamine (ANASPAZ,LEVSIN) 0.125 MG tablet, Take 1 tablet by mouth Every 6 (Six) Hours As Needed for Cramping., Disp: 30 tablet, Rfl: 2  •  loperamide (IMODIUM) 2 MG capsule, Take 2 mg by mouth 4 (Four) Times a Day As Needed for Diarrhea., Disp: , Rfl:   •  ondansetron (ZOFRAN) 8 MG tablet, Take 1 tablet by mouth 3 (Three) Times a Day As Needed for Nausea or Vomiting., Disp: 30 tablet, Rfl: 5  •  oxyCODONE-acetaminophen (PERCOCET) 5-325 MG per tablet, Take 1 tablet by mouth Every 6 (Six) Hours As Needed for Moderate Pain ., Disp: 30 tablet, Rfl: 0  •  phenazopyridine (AZO DINE) 95 MG tablet, Take 95 mg by mouth 3 (Three) Times a Day As Needed for bladder spasms., Disp: , Rfl:   •  pramoxine (PROCTOFOAM) 1  % foam, Insert  into the rectum Every 2 (Two) Hours As Needed for Hemorrhoids., Disp: 15 g, Rfl: 2  •  rivaroxaban (XARELTO) 20 MG tablet, Take 1 tablet by mouth Daily. Start after finishing starter pack., Disp: 30 tablet, Rfl: 11  •  Rivaroxaban (XARELTO) tablet therapy pack starter pack, Take one 15 mg tablet twice daily with food for 21 days.  Followed by one 20 mg tablet by mouth once daily with food., Disp: 51 tablet, Rfl: 0  •  silver sulfadiazine (SILVADENE, SSD) 1 % cream, Apply  topically to the appropriate area as directed 2 (Two) Times a Day., Disp: 50 g, Rfl: 1    Allergies:  No Known Allergies    Family History:  her family history includes Cancer in her paternal uncle; Colon cancer in her maternal uncle and paternal uncle; Colon polyps in her mother; Diabetes in her paternal uncle; Factor V Leiden deficiency in her mother; Heart disease in her mother and paternal grandfather; Hyperlipidemia in her mother; Hypertension in her mother; Mental illness in her sister; No Known Problems in her son; Skin cancer in her father.    Social History:  she  reports that she quit smoking about 4 weeks ago. Her smoking use included electronic cigarette and cigarettes. She has a 24.00 pack-year smoking history. She has never used smokeless tobacco. She reports that she drinks alcohol. She reports that she does not use drugs.    Pertinent Findings on Review of Systems:  Fourteen systems have been reviewed with the patient and are negative other than as mentioned above.    Pertinent Findings on Physical Exam:  Vitals:    10/18/19 0929   BP: 132/86   Pulse: 89   SpO2: 97%   Weight: 103 kg (227 lb)   PainSc: 0-No pain     Physical Exam    Assessment:    1. Adenocarcinoma of rectum (CMS/HCC)    Stage IIA (cT3, cN0, cM0)  Doing well post treatment    Plan:     She has recovered well from the acute side effects of the treatment and will have her CT scans next week followed by visits with Dr. Ye on November 1 and Dr. Nguyen  on November 5. She is also following up with Urology regarding the kidney stone.  Will await those evaluations and see her back as needed.

## 2019-10-24 ENCOUNTER — DOCUMENTATION (OUTPATIENT)
Dept: RADIATION ONCOLOGY | Facility: HOSPITAL | Age: 39
End: 2019-10-24

## 2019-10-24 NOTE — PROGRESS NOTES
Patient phones for results of CT scans before her appointments next week. Reviewed them - stability of lung nodules. Decreased thickening in rectal region consistent with response but still thickening seen. NO new LAD, mets. She asked questions and expressed understanding. Will see Dr. Ye and Patrick next week.

## 2019-10-25 ENCOUNTER — INFUSION (OUTPATIENT)
Dept: ONCOLOGY | Facility: HOSPITAL | Age: 39
End: 2019-10-25

## 2019-10-25 VITALS
SYSTOLIC BLOOD PRESSURE: 131 MMHG | WEIGHT: 226.2 LBS | DIASTOLIC BLOOD PRESSURE: 85 MMHG | TEMPERATURE: 97.8 F | HEART RATE: 72 BPM | BODY MASS INDEX: 37.69 KG/M2 | OXYGEN SATURATION: 95 % | HEIGHT: 65 IN | RESPIRATION RATE: 18 BRPM

## 2019-10-25 DIAGNOSIS — C20 ADENOCARCINOMA OF RECTUM (HCC): ICD-10-CM

## 2019-10-25 PROCEDURE — 25010000002 ALTEPLASE 2 MG RECONSTITUTED SOLUTION: Performed by: INTERNAL MEDICINE

## 2019-10-25 RX ADMIN — ALTEPLASE: 2.2 INJECTION, POWDER, LYOPHILIZED, FOR SOLUTION INTRAVENOUS at 13:06

## 2019-10-25 NOTE — PROGRESS NOTES
Pt presents to have activase instilled in port per appointment note. Called and reviewed with Dr. Nguyen. Per Dr. Nguyen, pt to have activase 2mg instilled in port today and will return Monday 10/28/19 to have activase removed. Pt has fibrin sheath per most recent dye study. Explained this to pt and she agrees. Appointment made for pt to return Monday morning to have activase removed. Port accessed and flushed with NS. Negative blood return noted. Activase instilled, joan adkins'rick, and pt sent to appt desk to schedule appt for Monday.

## 2019-10-28 ENCOUNTER — INFUSION (OUTPATIENT)
Dept: ONCOLOGY | Facility: HOSPITAL | Age: 39
End: 2019-10-28

## 2019-10-28 ENCOUNTER — TELEPHONE (OUTPATIENT)
Dept: ONCOLOGY | Facility: HOSPITAL | Age: 39
End: 2019-10-28

## 2019-10-28 DIAGNOSIS — Z45.2 ENCOUNTER FOR FITTING AND ADJUSTMENT OF VASCULAR CATHETER: Primary | ICD-10-CM

## 2019-10-28 PROCEDURE — 96523 IRRIG DRUG DELIVERY DEVICE: CPT | Performed by: INTERNAL MEDICINE

## 2019-10-28 RX ORDER — SODIUM CHLORIDE 0.9 % (FLUSH) 0.9 %
10 SYRINGE (ML) INJECTION AS NEEDED
Status: CANCELLED | OUTPATIENT
Start: 2019-10-28

## 2019-10-28 RX ORDER — SODIUM CHLORIDE 0.9 % (FLUSH) 0.9 %
10 SYRINGE (ML) INJECTION AS NEEDED
Status: DISCONTINUED | OUTPATIENT
Start: 2019-10-28 | End: 2019-10-28 | Stop reason: HOSPADM

## 2019-10-28 RX ADMIN — Medication 10 ML: at 08:51

## 2019-10-28 RX ADMIN — SODIUM CHLORIDE, PRESERVATIVE FREE 500 UNITS: 5 INJECTION INTRAVENOUS at 08:51

## 2019-10-28 NOTE — TELEPHONE ENCOUNTER
----- Message from Sandrita Lagunas sent at 10/28/2019  2:23 PM EDT -----  [phone]    Pt is on Xarelto, has 3 PE.  She needs to have procedure for kidney stones her urologist wants to know if she can go off Xarelto tomorrow and Wednesday and then start back on it.

## 2019-10-28 NOTE — NURSING NOTE
Pt arrived ambulatory to have Activase removed from port that was placed Friday. Pt port accessed per protocol, Activase removed and good blood return noted. Port flushed per protocol.

## 2019-10-28 NOTE — TELEPHONE ENCOUNTER
Pt called and said she had a scan done that was ordered by Dr Garcia to see if she had passed her kidney stone and will know the results tomorrow. Pt said currently she is having no pain or burning upon urination and it is possible she may have passed the stone. Dr Garcia wants her to hold her Xarelto tomorrow and when she sees him, if she still has the stone, she would need for it to be held Wednesday in preparation for the stone removal.If there is no stone, she would continue taking the Xarelto. Reviewed with Dr Mccartney (#2), he doesn't want the Xarelto held until we know for sure if pt still has a kidney stone. Pt made aware and will call back tomorrow with the results of the scan so it can be re-addressed if need be.

## 2019-11-01 ENCOUNTER — OFFICE VISIT (OUTPATIENT)
Dept: SURGERY | Facility: CLINIC | Age: 39
End: 2019-11-01

## 2019-11-01 VITALS
TEMPERATURE: 97.8 F | BODY MASS INDEX: 37.47 KG/M2 | DIASTOLIC BLOOD PRESSURE: 74 MMHG | WEIGHT: 224.9 LBS | HEART RATE: 88 BPM | SYSTOLIC BLOOD PRESSURE: 122 MMHG | OXYGEN SATURATION: 96 % | HEIGHT: 65 IN

## 2019-11-01 DIAGNOSIS — C20 RECTAL CANCER (HCC): Primary | ICD-10-CM

## 2019-11-01 DIAGNOSIS — I26.99 OTHER PULMONARY EMBOLISM WITHOUT ACUTE COR PULMONALE, UNSPECIFIED CHRONICITY (HCC): ICD-10-CM

## 2019-11-01 DIAGNOSIS — Z79.01 ANTICOAGULATED: ICD-10-CM

## 2019-11-01 PROCEDURE — 99212 OFFICE O/P EST SF 10 MIN: CPT | Performed by: COLON & RECTAL SURGERY

## 2019-11-01 RX ORDER — NEOMYCIN SULFATE 500 MG/1
TABLET ORAL
Qty: 6 TABLET | Refills: 0 | Status: ON HOLD | OUTPATIENT
Start: 2019-11-01 | End: 2019-12-02

## 2019-11-01 RX ORDER — ACETAMINOPHEN 500 MG
1000 TABLET ORAL EVERY 6 HOURS
Status: CANCELLED | OUTPATIENT
Start: 2019-12-02

## 2019-11-01 RX ORDER — CELECOXIB 200 MG/1
200 CAPSULE ORAL ONCE
Status: CANCELLED | OUTPATIENT
Start: 2019-12-02

## 2019-11-01 RX ORDER — CHLORHEXIDINE GLUCONATE 4 G/100ML
SOLUTION TOPICAL 2 TIMES DAILY
Qty: 236 ML | Refills: 0 | Status: ON HOLD | OUTPATIENT
Start: 2019-11-01 | End: 2019-12-02

## 2019-11-01 RX ORDER — ALVIMOPAN 12 MG/1
12 CAPSULE ORAL ONCE
Status: CANCELLED | OUTPATIENT
Start: 2019-12-02 | End: 2019-11-01

## 2019-11-01 RX ORDER — CEFAZOLIN SODIUM 2 G/100ML
2 INJECTION, SOLUTION INTRAVENOUS ONCE
Status: CANCELLED | OUTPATIENT
Start: 2019-12-02 | End: 2019-11-01

## 2019-11-01 RX ORDER — METRONIDAZOLE 500 MG/1
TABLET ORAL
Qty: 3 TABLET | Refills: 0 | Status: ON HOLD | OUTPATIENT
Start: 2019-11-01 | End: 2019-12-02

## 2019-11-01 NOTE — PROGRESS NOTES
Carole Weaver is a 39 y.o. female in for follow up of Rectal cancer (CMS/HCC)    Other pulmonary embolism without acute cor pulmonale, unspecified chronicity (CMS/HCC)    Anticoagulated    She finished chemoradiation 2019    On Xarelto for PE diagnosed 2019    Went to ED 10/1/2019 for kidney stones  Past Medical History:   Diagnosis Date   • Abnormal Pap smear of cervix 1998    JULIUS I   • Anemia in pregnancy    • Anxiety    • Cervical lymphadenitis 2012    SEEN AT [facility] ER   • Chronic diarrhea    • Colon polyps     FOLLOWED BY DR. GERONIMO ESPARZA   • Depression    • Factor V Leiden (CMS/HCC)     DX 2019   • GERD (gastroesophageal reflux disease)    • Hematochezia    • Hemorrhoids    • History of chemotherapy     FOLLOWED BY DR. ALEXANDRU HUNT   • History of radiation therapy     FOLLOWED BY DR. JAVON LEWIS   • History of TB skin testing 2009    TB Skin Test   • HPV in female    • Infected insect bite of neck 2016    SEEN AT [facility]   • Irritable bowel syndrome    • Kidney stone    • Kidney stones 2007    SEEN AT [facility] ER   • Lumbar disc disease    • PIH (pregnancy induced hypertension)    • Pulmonary embolism (CMS/HCC) 2019    ADMITTED TO [facility]   • Rectal cancer (CMS/HCC) 2019    INVASIVE MODERATELY DIFFERENTIATED ADENOCARCINOMA   • Right leg pain    • Right ureteral stone 10/01/2019    SEEN AT [facility] ER   • SAB (spontaneous ) 1996     A2-1 INDUCED   • Sepsis due to cellulitis (CMS/HCC) 2002    LEFT GREAT TOE, ADMITTED TO [facility]     Past Surgical History:   Procedure Laterality Date   • CHOLECYSTECTOMY N/A 10/10/2003    LAPAROSCOPIC WITH CHOLANGIOGRAM, DR. JAMEY TALAVERA AT [facility]   • COLONOSCOPY W/ POLYPECTOMY N/A 2019    15 MM TUBULOVILLOUS ADENOMA POLYP IN HEPATIC FLEXURE, 20 MMTUBULOVILLOUS ADENOMA WITH HIGH GRADE DYSPLASIA IN RECTOSIGMOID, 4 CM MASS IN MID RECTUM, PATH: INVASIVE MODERATELY DIFFERENTIATED ADENOCARCINOMA,   "JENNIFER LI AT [facility]   • DILATATION AND EVACUATION N/A 2009   • ENDOSCOPY N/A 07/08/2019    LA GRADE A ESOPHAGITIS, GASTRITIS, ALL BIOPSIES BENIGN, DR. JENNIFER LI AT [facility]   • PAP SMEAR N/A 01/24/2014   • SIGMOIDOSCOPY N/A 7/24/2019    INFILTRATIVE PARTIALLY OBSTRUCTING LARGE RECTAL CANCER, AREA TATOOED, DR. PADMAJA ESPARZA AT [facility]   • TRANSRECTAL ULTRASOUND N/A 7/24/2019    Procedure: ULTRASOUND TRANSRECTAL;  Surgeon: Padmaja Esparza MD;  Location: [facility] ENDOSCOPY;  Service: General   • VAGINAL DELIVERY N/A 09/18/1998   • VENOUS ACCESS DEVICE (PORT) INSERTION Left 8/9/2019    Procedure: INSERTION VENOUS ACCESS DEVICE;  Surgeon: Sj Joseph MD;  [place] [facility] [place] [facility];  Service: General   • WISDOM TOOTH EXTRACTION  1993         Ht 166 cm (65.35\")   BMI 37.24 kg/m²   Body mass index is 37.24 kg/m².      PE:  Physical Exam   Constitutional: She appears well-developed. No distress.   HENT:   Head: Normocephalic and atraumatic.   Abdominal: Soft. She exhibits no distension.   Musculoskeletal: Normal range of motion.   Neurological: She is alert.   Psychiatric: Thought content normal.         Assessment:   1. Rectal cancer (CMS/HCC)    2. Other pulmonary embolism without acute cor pulmonale, unspecified chronicity (CMS/HCC)    3. Anticoagulated         Plan:    To assess response to chemoradiation, I recommend flexible sigmoidoscopy.  Pending these results, I recommended to patient a laparoscopic low anterior resection with diverting loop ileostomy.  I discussed with them typical surgical time, hospital recovery, and home recovery.   I described to the patient risks, benefits, and alternatives. I discussed risks of surgery being heart attack, stroke, exacerbation of chronic medical illness, pneumonia, DVT, PTE, infection, bleeding, and injury to other organs during surgery. Patient expresses understanding and wishes to proceed.      "

## 2019-11-05 ENCOUNTER — APPOINTMENT (OUTPATIENT)
Dept: ONCOLOGY | Facility: CLINIC | Age: 39
End: 2019-11-05

## 2019-11-05 ENCOUNTER — APPOINTMENT (OUTPATIENT)
Dept: LAB | Facility: HOSPITAL | Age: 39
End: 2019-11-05

## 2019-11-11 RX ORDER — OXYCODONE HYDROCHLORIDE AND ACETAMINOPHEN 5; 325 MG/1; MG/1
1 TABLET ORAL EVERY 6 HOURS PRN
Qty: 20 TABLET | Refills: 0 | Status: SHIPPED | OUTPATIENT
Start: 2019-11-11 | End: 2019-11-14

## 2019-11-11 RX ORDER — OXYCODONE HYDROCHLORIDE AND ACETAMINOPHEN 5; 325 MG/1; MG/1
1 TABLET ORAL EVERY 6 HOURS PRN
Qty: 20 TABLET | Refills: 0 | Status: SHIPPED | OUTPATIENT
Start: 2019-11-11 | End: 2019-11-23 | Stop reason: HOSPADM

## 2019-11-11 RX ORDER — OXYCODONE HYDROCHLORIDE AND ACETAMINOPHEN 5; 325 MG/1; MG/1
1 TABLET ORAL EVERY 6 HOURS PRN
Qty: 30 TABLET | Refills: 0 | Status: SHIPPED | OUTPATIENT
Start: 2019-11-11 | End: 2019-11-11 | Stop reason: SDUPTHER

## 2019-11-14 ENCOUNTER — OFFICE VISIT (OUTPATIENT)
Dept: INTERNAL MEDICINE | Facility: CLINIC | Age: 39
End: 2019-11-14

## 2019-11-14 VITALS
HEIGHT: 65 IN | BODY MASS INDEX: 37.22 KG/M2 | DIASTOLIC BLOOD PRESSURE: 91 MMHG | OXYGEN SATURATION: 98 % | HEART RATE: 82 BPM | SYSTOLIC BLOOD PRESSURE: 153 MMHG | WEIGHT: 223.4 LBS

## 2019-11-14 DIAGNOSIS — F41.9 ANXIETY: ICD-10-CM

## 2019-11-14 DIAGNOSIS — C20 ADENOCARCINOMA OF RECTUM (HCC): Primary | ICD-10-CM

## 2019-11-14 PROCEDURE — 99213 OFFICE O/P EST LOW 20 MIN: CPT | Performed by: FAMILY MEDICINE

## 2019-11-14 RX ORDER — CLONAZEPAM 1 MG/1
1 TABLET ORAL 3 TIMES DAILY PRN
Qty: 90 TABLET | Refills: 1 | Status: SHIPPED | OUTPATIENT
Start: 2019-11-14 | End: 2020-03-30 | Stop reason: SDUPTHER

## 2019-11-14 RX ORDER — HYDROCODONE BITARTRATE AND ACETAMINOPHEN 7.5; 325 MG/1; MG/1
1 TABLET ORAL EVERY 4 HOURS PRN
Qty: 80 TABLET | Refills: 0 | Status: SHIPPED | OUTPATIENT
Start: 2019-11-14 | End: 2020-01-02 | Stop reason: SDUPTHER

## 2019-11-14 NOTE — PROGRESS NOTES
CC:new dx with colon cancer    Subjective.../HPI  Patient present today with4= pain and anxiety due to colon cancer. Surgery 19. Anxious will be allowwed to suffer ijn pain    I have reviewed the patient's medical history in detail and updated the computerized patient record.    Past Medical History:   Diagnosis Date   • Abnormal Pap smear of cervix 1998    JULIUS I   • Anemia in pregnancy    • Anxiety    • Cervical lymphadenitis 2012    SEEN AT [facility] ER   • Chronic diarrhea    • Colon polyps     FOLLOWED BY DR. GERONIMO ESPARZA   • Depression    • Factor V Leiden (CMS/HCC)     DX 2019   • GERD (gastroesophageal reflux disease)    • Hematochezia    • Hemorrhoids    • History of chemotherapy     FOLLOWED BY DR. ALEXANDRU HUNT   • History of radiation therapy     FOLLOWED BY DR. JAVON LEWIS   • History of TB skin testing 2009    TB Skin Test   • HPV in female    • Infected insect bite of neck 2016    SEEN AT [facility]   • Irritable bowel syndrome    • Kidney stone    • Kidney stones 2007    SEEN AT [facility] ER   • Lumbar disc disease    • PIH (pregnancy induced hypertension)    • Pulmonary embolism (CMS/HCC) 2019    ADMITTED TO [facility]   • Rectal cancer (CMS/HCC) 2019    INVASIVE MODERATELY DIFFERENTIATED ADENOCARCINOMA   • Right leg pain    • Right ureteral stone 10/01/2019    SEEN AT [facility] ER   • SAB (spontaneous ) 1996     A2-1 INDUCED   • Sepsis due to cellulitis (CMS/HCC) 2002    LEFT GREAT TOE, ADMITTED TO [facility]       Past Surgical History:   Procedure Laterality Date   • CHOLECYSTECTOMY N/A 10/10/2003    LAPAROSCOPIC WITH CHOLANGIOGRAM, DR. JAMEY TALAVERA AT [facility]   • COLONOSCOPY W/ POLYPECTOMY N/A 2019    15 MM TUBULOVILLOUS ADENOMA POLYP IN HEPATIC FLEXURE, 20 MMTUBULOVILLOUS ADENOMA WITH HIGH GRADE DYSPLASIA IN RECTOSIGMOID, 4 CM MASS IN MID RECTUM, PATH: INVASIVE MODERATELY DIFFERENTIATED ADENOCARCINOMA, DR. JENNIFER LI AT [facility]   • DILATATION AND EVACUATION N/A    • ENDOSCOPY N/A 2019    LA GRADE A ESOPHAGITIS, GASTRITIS, ALL BIOPSIES BENIGN, DR. JENNIFER LI AT [facility]   • PAP SMEAR N/A 2014   • SIGMOIDOSCOPY N/A 2019    INFILTRATIVE PARTIALLY OBSTRUCTING LARGE RECTAL CANCER, AREA TATOOED, DR. GERONIMO ESPARZA AT [facility]   • TRANSRECTAL ULTRASOUND N/A 2019    Procedure: ULTRASOUND TRANSRECTAL;  Surgeon: Geronimo Esparza MD;  Location: [facility] ENDOSCOPY;  Service: General   • VAGINAL DELIVERY N/A 1998   • VENOUS ACCESS DEVICE (PORT) INSERTION Left 2019    Procedure: INSERTION VENOUS ACCESS DEVICE;  Surgeon: Sj Joseph MD;  Location: [facility] [place] [facility];  Service: General   • WISDOM TOOTH EXTRACTION         Family History   Problem Relation Age of Onset   • Hyperlipidemia Mother    • Colon polyps Mother    • Heart disease Mother    • Hypertension Mother    • Factor V Leiden deficiency Mother    • Skin cancer Father         Squamous in 60s   • Heart disease Paternal Grandfather    • No Known Problems Son    • Cancer Paternal Uncle    • Colon cancer Paternal Uncle    • Diabetes Paternal Uncle    • Mental illness Sister         Addiction   • Colon cancer Maternal Uncle    • Malig Hyperthermia Neg Hx        Social History     Socioeconomic History   • Marital status:      Spouse name: Justus   • Number of children: 1   • Years of education: College   • Highest education level: Not on file   Occupational History   • Occupation:      Comment: [employer]     Employer: [employer]   Tobacco Use   • Smoking status: Former Smoker     Packs/day: 1.00     Years: 24.00     Pack years: 24.00     Types: Electronic Cigarette, Cigarettes     Last attempt to quit: 2019     Years since quittin.1   • Smokeless tobacco: Never Used   Substance and Sexual Activity   • Alcohol use: Yes     Comment: Rarely   • Drug use: No   • Sexual activity: Defer       Most Recent  "Immunizations   Administered Date(s) Administered   • flucelvax quad pfs =>4 YRS 11/08/2018       Review of Systems:   Review of Systems   Constitutional: Negative.    HENT: Negative.    All other systems reviewed and are negative.        Physical Exam   Constitutional: She is oriented to person, place, and time. She appears well-developed and well-nourished.   Cardiovascular: Normal rate, regular rhythm, normal heart sounds and intact distal pulses.   Pulmonary/Chest: Effort normal and breath sounds normal.   Neurological: She is alert and oriented to person, place, and time.   Psychiatric: She has a normal mood and affect. Her behavior is normal. Judgment and thought content normal.   Vitals reviewed.        Vital Signs     Vitals:    11/14/19 1612   BP: 153/91   Pulse: 82   SpO2: 98%   Weight: 101 kg (223 lb 6.4 oz)   Height: 166 cm (65.35\")          Results Review:      REVIEWED AND DISCUSSED CLINICAL RESULTS WITH PATIENT      Requested Prescriptions     Pending Prescriptions Disp Refills   • clonazePAM (KlonoPIN) 1 MG tablet 90 tablet 1     Sig: Take 1 tablet by mouth 2 (Two) Times a Day As Needed for Seizures.     Signed Prescriptions Disp Refills   • HYDROcodone-acetaminophen (NORCO) 7.5-325 MG per tablet 80 tablet 0     Sig: Take 1 tablet by mouth Every 4 (Four) Hours As Needed for Moderate Pain .         Current Outpatient Medications:   •  chlorhexidine (HIBICLENS) 4 % external liquid, Apply  topically to the appropriate area as directed 2 (Two) Times a Day. Shower With Hibiclens Solution Twice The Day Before Surgery, Disp: 236 mL, Rfl: 0  •  clonazePAM (KLONOPIN) 0.5 MG tablet, Take 1 tablet by mouth 2 (Two) Times a Day As Needed for Seizures. (Patient taking differently: Take 0.5 mg by mouth 1 (One) Time As Needed for Seizures.), Disp: 60 tablet, Rfl: 5  •  dicyclomine (BENTYL) 20 MG tablet, Take 1 tablet by mouth Every 6 (Six) Hours. (Patient taking differently: Take 20 mg by mouth As Needed.), Disp: " 120 tablet, Rfl: 6  •  escitalopram (LEXAPRO) 20 MG tablet, Take 1 tablet by mouth Daily., Disp: 90 tablet, Rfl: 3  •  famotidine (PEPCID) 20 MG tablet, TAKE 1 TABLET BY MOUTH TWICE A DAY, Disp: 60 tablet, Rfl: 2  •  hyoscyamine (ANASPAZ,LEVSIN) 0.125 MG tablet, Take 1 tablet by mouth Every 6 (Six) Hours As Needed for Cramping., Disp: 30 tablet, Rfl: 2  •  loperamide (IMODIUM) 2 MG capsule, Take 2 mg by mouth 4 (Four) Times a Day As Needed for Diarrhea., Disp: , Rfl:   •  metroNIDAZOLE (FLAGYL) 500 MG tablet, 1 pill po @ 2 pm , 3 pm , and 10 pm the day before surgery, Disp: 3 tablet, Rfl: 0  •  neomycin (MYCIFRADIN) 500 MG tablet, 2 pills po @ 2 pm , 3 pm , and 10 pm the day before surgery, Disp: 6 tablet, Rfl: 0  •  ondansetron (ZOFRAN) 8 MG tablet, Take 1 tablet by mouth 3 (Three) Times a Day As Needed for Nausea or Vomiting., Disp: 30 tablet, Rfl: 5  •  oxyCODONE-acetaminophen (PERCOCET) 5-325 MG per tablet, Take 1 tablet by mouth Every 6 (Six) Hours As Needed for Moderate Pain ., Disp: 20 tablet, Rfl: 0  •  phenazopyridine (AZO DINE) 95 MG tablet, Take 95 mg by mouth 3 (Three) Times a Day As Needed for bladder spasms., Disp: , Rfl:   •  pramoxine (PROCTOFOAM) 1 % foam, Insert  into the rectum Every 2 (Two) Hours As Needed for Hemorrhoids., Disp: 15 g, Rfl: 2  •  rivaroxaban (XARELTO) 20 MG tablet, Take 1 tablet by mouth Daily. Start after finishing starter pack., Disp: 30 tablet, Rfl: 11  •  silver sulfadiazine (SILVADENE, SSD) 1 % cream, Apply  topically to the appropriate area as directed 2 (Two) Times a Day., Disp: 50 g, Rfl: 1  •  HYDROcodone-acetaminophen (NORCO) 7.5-325 MG per tablet, Take 1 tablet by mouth Every 4 (Four) Hours As Needed for Moderate Pain ., Disp: 80 tablet, Rfl: 0    Procedures          Diagnoses and all orders for this visit:    Adenocarcinoma of rectum (CMS/HCC)    Anxiety    Other orders  -     HYDROcodone-acetaminophen (NORCO) 7.5-325 MG per tablet; Take 1 tablet by mouth Every 4  (Four) Hours As Needed for Moderate Pain .        There are no Patient Instructions on file for this visit.     No Follow-up on file.    Minesh Leone M.D  11/14/19  4:52 PM

## 2019-11-22 ENCOUNTER — DOCUMENTATION (OUTPATIENT)
Dept: ONCOLOGY | Facility: HOSPITAL | Age: 39
End: 2019-11-22

## 2019-11-22 ENCOUNTER — TELEPHONE (OUTPATIENT)
Dept: ONCOLOGY | Facility: CLINIC | Age: 39
End: 2019-11-22

## 2019-11-22 NOTE — TELEPHONE ENCOUNTER
Pt called stating she has an appt on 11/25 for PAT prior to colon surgery. She wants to have her labs and port flushed in PAT. Called and spoke with PAT, they need written order stating it is OK to draw labs out of port. Progress note placed with orders and faxed to [phone]    Called and informed pt. Pt is aware

## 2019-11-22 NOTE — PROGRESS NOTES
Subjective  Pre Admission Testing Orders    PATIENT NAME:  Carole Weaver  YOB: 1980  PATIENT'S SEX:  female    PATIENT'S ADDRESS:  [address] [place], [place] [id number]  PROVIDER:      No diagnosis found.  No Known Allergies        DATE      11/25/2019     Please access [facility] for any lab work. Flush with 10 cc NS and 500 unit Heparin flush prior to deaccessing.         Polly Ewing RN   For Dr. Nguyen

## 2019-11-23 ENCOUNTER — HOSPITAL ENCOUNTER (OUTPATIENT)
Facility: HOSPITAL | Age: 39
Setting detail: HOSPITAL OUTPATIENT SURGERY
Discharge: HOME | End: 2019-11-23
Attending: COLON & RECTAL SURGERY | Admitting: COLON & RECTAL SURGERY

## 2019-11-23 ENCOUNTER — ANESTHESIA EVENT (OUTPATIENT)
Dept: GASTROENTEROLOGY | Facility: HOSPITAL | Age: 39
End: 2019-11-23

## 2019-11-23 ENCOUNTER — ANESTHESIA (OUTPATIENT)
Dept: GASTROENTEROLOGY | Facility: HOSPITAL | Age: 39
End: 2019-11-23

## 2019-11-23 VITALS
BODY MASS INDEX: 35.68 KG/M2 | SYSTOLIC BLOOD PRESSURE: 126 MMHG | OXYGEN SATURATION: 95 % | WEIGHT: 222 LBS | DIASTOLIC BLOOD PRESSURE: 86 MMHG | HEIGHT: 66 IN | RESPIRATION RATE: 16 BRPM | HEART RATE: 70 BPM | TEMPERATURE: 97.5 F

## 2019-11-23 PROCEDURE — 81025 URINE PREGNANCY TEST: CPT | Performed by: COLON & RECTAL SURGERY

## 2019-11-23 PROCEDURE — 25010000002 PROPOFOL 10 MG/ML EMULSION: Performed by: ANESTHESIOLOGY

## 2019-11-23 PROCEDURE — 45335 SIGMOIDOSCOPY W/SUBMUC INJ: CPT | Performed by: COLON & RECTAL SURGERY

## 2019-11-23 RX ORDER — PROPOFOL 10 MG/ML
VIAL (ML) INTRAVENOUS AS NEEDED
Status: DISCONTINUED | OUTPATIENT
Start: 2019-11-23 | End: 2019-11-23 | Stop reason: SURG

## 2019-11-23 RX ORDER — SODIUM CHLORIDE, SODIUM LACTATE, POTASSIUM CHLORIDE, CALCIUM CHLORIDE 600; 310; 30; 20 MG/100ML; MG/100ML; MG/100ML; MG/100ML
1000 INJECTION, SOLUTION INTRAVENOUS CONTINUOUS
Status: DISCONTINUED | OUTPATIENT
Start: 2019-11-23 | End: 2019-11-23 | Stop reason: HOSPADM

## 2019-11-23 RX ORDER — PROPOFOL 10 MG/ML
VIAL (ML) INTRAVENOUS CONTINUOUS PRN
Status: DISCONTINUED | OUTPATIENT
Start: 2019-11-23 | End: 2019-11-23 | Stop reason: SURG

## 2019-11-23 RX ORDER — SODIUM CHLORIDE 0.9 % (FLUSH) 0.9 %
10 SYRINGE (ML) INJECTION AS NEEDED
Status: DISCONTINUED | OUTPATIENT
Start: 2019-11-23 | End: 2019-11-23 | Stop reason: HOSPADM

## 2019-11-23 RX ORDER — LIDOCAINE HYDROCHLORIDE 20 MG/ML
INJECTION, SOLUTION INFILTRATION; PERINEURAL AS NEEDED
Status: DISCONTINUED | OUTPATIENT
Start: 2019-11-23 | End: 2019-11-23 | Stop reason: SURG

## 2019-11-23 RX ORDER — SODIUM CHLORIDE 9 MG/ML
20 INJECTION, SOLUTION INTRAVENOUS CONTINUOUS
Status: DISCONTINUED | OUTPATIENT
Start: 2019-11-23 | End: 2019-11-23 | Stop reason: HOSPADM

## 2019-11-23 RX ORDER — LIDOCAINE HYDROCHLORIDE 10 MG/ML
0.5 INJECTION, SOLUTION INFILTRATION; PERINEURAL ONCE AS NEEDED
Status: DISCONTINUED | OUTPATIENT
Start: 2019-11-23 | End: 2019-11-23 | Stop reason: HOSPADM

## 2019-11-23 RX ADMIN — PROPOFOL 100 MCG/KG/MIN: 10 INJECTION, EMULSION INTRAVENOUS at 12:56

## 2019-11-23 RX ADMIN — SODIUM CHLORIDE, POTASSIUM CHLORIDE, SODIUM LACTATE AND CALCIUM CHLORIDE: 600; 310; 30; 20 INJECTION, SOLUTION INTRAVENOUS at 12:04

## 2019-11-23 RX ADMIN — PROPOFOL 100 MG: 10 INJECTION, EMULSION INTRAVENOUS at 12:56

## 2019-11-23 RX ADMIN — LIDOCAINE HYDROCHLORIDE 80 MG: 20 INJECTION, SOLUTION INFILTRATION; PERINEURAL at 12:57

## 2019-11-23 RX ADMIN — SODIUM CHLORIDE 20 ML/HR: 9 INJECTION, SOLUTION INTRAVENOUS at 12:37

## 2019-11-23 NOTE — ANESTHESIA PREPROCEDURE EVALUATION
Anesthesia Evaluation     Patient summary reviewed and Nursing notes reviewed   no history of anesthetic complications:  NPO Solid Status: > 8 hours  NPO Liquid Status: > 8 hours           Airway   Mallampati: II  Dental      Pulmonary - normal exam   (+) pulmonary embolism, a smoker Former,   Cardiovascular - normal exam    (+) hypertension,       Neuro/Psych  (+) psychiatric history Anxiety and Depression,     GI/Hepatic/Renal/Endo    (+) obesity,  GERD,  renal disease stones,     Musculoskeletal     Abdominal    Substance History      OB/GYN          Other      history of cancer active                    Anesthesia Plan    ASA 3     MAC     intravenous induction     Anesthetic plan, all risks, benefits, and alternatives have been provided, discussed and informed consent has been obtained with: patient.

## 2019-11-23 NOTE — ANESTHESIA POSTPROCEDURE EVALUATION
"Patient: Carole Weaver    Procedure Summary     Date:  11/23/19 Room / Location:   [facility]    Anesthesia Start:  1255 Anesthesia Stop:  1308    Procedure:  SIGMOIDOSCOPY FLEXIBLE WITH TATTOO INJECTION (N/A ) Diagnosis:       Rectal cancer (CMS/HCC)      (Rectal cancer (CMS/HCC) [C20])    Surgeon:  Padmaja Ye MD Provider:  Eusebio Heredia MD    Anesthesia Type:  MAC ASA Status:  3          Anesthesia Type: MAC  Last vitals  BP   110/70 (11/23/19 1306)   Temp   36.4 °C (97.5 °F) (11/23/19 1225)   Pulse   76 (11/23/19 1306)   Resp   16 (11/23/19 1306)     SpO2   96 % (11/23/19 1306)     Post Anesthesia Care and Evaluation    Patient location during evaluation: bedside  Patient participation: complete - patient participated  Level of consciousness: awake and alert  Pain management: adequate  Airway patency: patent  Anesthetic complications: No anesthetic complications    Cardiovascular status: acceptable  Respiratory status: acceptable  Hydration status: acceptable    Comments: /70   Pulse 76   Temp 36.4 °C (97.5 °F) (Oral)   Resp 16   Ht 167.6 cm (66\")   Wt 101 kg (222 lb)   LMP 09/23/2019   SpO2 96%   BMI 35.83 kg/m²       "

## 2019-11-25 ENCOUNTER — HOSPITAL ENCOUNTER (OUTPATIENT)
Dept: INFUSION THERAPY | Facility: HOSPITAL | Age: 39
Discharge: HOME | End: 2019-11-25

## 2019-11-25 ENCOUNTER — APPOINTMENT (OUTPATIENT)
Dept: PREADMISSION TESTING | Facility: HOSPITAL | Age: 39
End: 2019-11-25

## 2019-11-25 ENCOUNTER — HOSPITAL ENCOUNTER (OUTPATIENT)
Dept: WOUND CARE | Facility: HOSPITAL | Age: 39
Discharge: HOME | End: 2019-11-25
Admitting: INTERNAL MEDICINE

## 2019-11-25 VITALS
DIASTOLIC BLOOD PRESSURE: 82 MMHG | HEART RATE: 74 BPM | SYSTOLIC BLOOD PRESSURE: 121 MMHG | OXYGEN SATURATION: 97 % | BODY MASS INDEX: 36.24 KG/M2 | RESPIRATION RATE: 16 BRPM | WEIGHT: 225.5 LBS | TEMPERATURE: 97.8 F | HEIGHT: 66 IN

## 2019-11-25 DIAGNOSIS — C20 RECTAL CANCER (HCC): ICD-10-CM

## 2019-11-25 DIAGNOSIS — Z45.2 ENCOUNTER FOR FITTING AND ADJUSTMENT OF VASCULAR CATHETER: Primary | ICD-10-CM

## 2019-11-25 LAB
ABO GROUP BLD: NORMAL
ANION GAP SERPL CALCULATED.3IONS-SCNC: 11.3 MMOL/L (ref 5–15)
BASOPHILS # BLD AUTO: 0.02 10*3/MM3 (ref 0–0.2)
BASOPHILS NFR BLD AUTO: 0.5 % (ref 0–1.5)
BLD GP AB SCN SERPL QL: NEGATIVE
BUN BLD-MCNC: 7 MG/DL (ref 6–20)
BUN/CREAT SERPL: 9.2 (ref 7–25)
CALCIUM SPEC-SCNC: 8.3 MG/DL (ref 8.6–10.5)
CHLORIDE SERPL-SCNC: 104 MMOL/L (ref 98–107)
CO2 SERPL-SCNC: 26.7 MMOL/L (ref 22–29)
CREAT BLD-MCNC: 0.76 MG/DL (ref 0.57–1)
DEPRECATED RDW RBC AUTO: 44.7 FL (ref 37–54)
EOSINOPHIL # BLD AUTO: 0.24 10*3/MM3 (ref 0–0.4)
EOSINOPHIL NFR BLD AUTO: 5.4 % (ref 0.3–6.2)
ERYTHROCYTE [DISTWIDTH] IN BLOOD BY AUTOMATED COUNT: 13.2 % (ref 12.3–15.4)
GFR SERPL CREATININE-BSD FRML MDRD: 84 ML/MIN/1.73
GLUCOSE BLD-MCNC: 85 MG/DL (ref 65–99)
HCG SERPL QL: NEGATIVE
HCT VFR BLD AUTO: 36.8 % (ref 34–46.6)
HGB BLD-MCNC: 11.8 G/DL (ref 12–15.9)
IMM GRANULOCYTES # BLD AUTO: 0.01 10*3/MM3 (ref 0–0.05)
IMM GRANULOCYTES NFR BLD AUTO: 0.2 % (ref 0–0.5)
LYMPHOCYTES # BLD AUTO: 0.97 10*3/MM3 (ref 0.7–3.1)
LYMPHOCYTES NFR BLD AUTO: 22 % (ref 19.6–45.3)
MCH RBC QN AUTO: 29.4 PG (ref 26.6–33)
MCHC RBC AUTO-ENTMCNC: 32.1 G/DL (ref 31.5–35.7)
MCV RBC AUTO: 91.8 FL (ref 79–97)
MONOCYTES # BLD AUTO: 0.42 10*3/MM3 (ref 0.1–0.9)
MONOCYTES NFR BLD AUTO: 9.5 % (ref 5–12)
NEUTROPHILS # BLD AUTO: 2.75 10*3/MM3 (ref 1.7–7)
NEUTROPHILS NFR BLD AUTO: 62.4 % (ref 42.7–76)
NRBC BLD AUTO-RTO: 0 /100 WBC (ref 0–0.2)
PLATELET # BLD AUTO: 260 10*3/MM3 (ref 140–450)
PMV BLD AUTO: 8.6 FL (ref 6–12)
POTASSIUM BLD-SCNC: 4 MMOL/L (ref 3.5–5.2)
RBC # BLD AUTO: 4.01 10*6/MM3 (ref 3.77–5.28)
RH BLD: POSITIVE
SODIUM BLD-SCNC: 142 MMOL/L (ref 136–145)
T&S EXPIRATION DATE: NORMAL
WBC NRBC COR # BLD: 4.41 10*3/MM3 (ref 3.4–10.8)

## 2019-11-25 PROCEDURE — 86850 RBC ANTIBODY SCREEN: CPT | Performed by: PHYSICIAN ASSISTANT

## 2019-11-25 PROCEDURE — 85025 COMPLETE CBC W/AUTO DIFF WBC: CPT | Performed by: COLON & RECTAL SURGERY

## 2019-11-25 PROCEDURE — 86923 COMPATIBILITY TEST ELECTRIC: CPT

## 2019-11-25 PROCEDURE — 84703 CHORIONIC GONADOTROPIN ASSAY: CPT | Performed by: PHYSICIAN ASSISTANT

## 2019-11-25 PROCEDURE — 80048 BASIC METABOLIC PNL TOTAL CA: CPT | Performed by: COLON & RECTAL SURGERY

## 2019-11-25 PROCEDURE — 36591 DRAW BLOOD OFF VENOUS DEVICE: CPT

## 2019-11-25 PROCEDURE — 86901 BLOOD TYPING SEROLOGIC RH(D): CPT | Performed by: PHYSICIAN ASSISTANT

## 2019-11-25 PROCEDURE — 86900 BLOOD TYPING SEROLOGIC ABO: CPT | Performed by: PHYSICIAN ASSISTANT

## 2019-11-25 RX ORDER — FAMOTIDINE 20 MG/1
20 TABLET, FILM COATED ORAL 2 TIMES DAILY
COMMUNITY
End: 2020-01-14

## 2019-11-25 RX ORDER — SODIUM CHLORIDE 0.9 % (FLUSH) 0.9 %
10 SYRINGE (ML) INJECTION AS NEEDED
Status: CANCELLED | OUTPATIENT
Start: 2019-11-25

## 2019-11-25 RX ORDER — CHLORHEXIDINE GLUCONATE 500 MG/1
1 CLOTH TOPICAL TAKE AS DIRECTED
Status: ON HOLD | COMMUNITY
End: 2019-12-02

## 2019-11-25 RX ORDER — SODIUM CHLORIDE 0.9 % (FLUSH) 0.9 %
10 SYRINGE (ML) INJECTION AS NEEDED
Status: DISCONTINUED | OUTPATIENT
Start: 2019-11-25 | End: 2019-11-27 | Stop reason: HOSPADM

## 2019-11-25 RX ADMIN — Medication 20 ML: at 16:39

## 2019-11-25 RX ADMIN — Medication 500 UNITS: at 16:39

## 2019-11-25 NOTE — NURSING NOTE
11/25/19 1540   Stoma Site Marking   Procedure Marking For ileostomy   Site Marked RLQ: right lower quadrant   Patient Assessment Screen rectus muscle identified;creases/scars avoided;marked within patient's visual field;bony prominences avoided;waistband avoided   How Was Patient Marked? surgical marker;transparent dressing   Stoma Marking Comments Patient has very deep crease/fold at level of the umbilicus. Marked below in RLQ. Answered all of her questions. Discussed daily care/pouch change/diet. Provided our info and online resources with Dariel. Mother present with her. She verbalized understanding.    Patient Position During Marking sitting;standing

## 2019-11-25 NOTE — PROGRESS NOTES
Patient here at [facility] for PAT today, port accessed in ACU to draw pre-op labs as ordered, See MAR and LDA. Blood bracelet sent home with patient and instructions provided per PAT nurse.

## 2019-11-25 NOTE — DISCHARGE INSTRUCTIONS
Take the following medications the morning of surgery with a small sip of water:    FAMOTIDINE, HYDROCODONE IF NEEDED    Arrive to hospital on your day of surgery at 7:30 AM.      General Instructions:  • Do not eat solid food after midnight the night before surgery.  • You may drink clear liquids day of surgery but must stop at least one hour before your hospital arrival time.  • It is beneficial for you to have a clear drink that contains carbohydrates the day of surgery.  We suggest a 12 to 20 ounce bottle of Gatorade or Powerade for non-diabetic patients or a 12 to 20 ounce bottle of G2 or Powerade Zero for diabetic patients. (Pediatric patients, are not advised to drink a 12 to 20 ounce carbohydrate drink)    Clear liquids are liquids you can see through.  Nothing red in color.     Plain water                               Sports drinks  Sodas                                   Gelatin (Jell-O)  Fruit juices without pulp such as white grape juice and apple juice  Popsicles that contain no fruit or yogurt  Tea or coffee (no cream or milk added)  Gatorade / Powerade  G2 / Powerade Zero    • Infants may have breast milk up to four hours before surgery.  • Infants drinking formula may drink formula up to six hours before surgery.   • Patients who avoid smoking, chewing tobacco and alcohol for 4 weeks prior to surgery have a reduced risk of post-operative complications.  Quit smoking as many days before surgery as you can.  • Do not smoke, use chewing tobacco or drink alcohol the day of surgery.   • If applicable bring your C-PAP/ BI-PAP machine.  • Bring any papers given to you in the doctor’s office.  • Wear clean comfortable clothes.  • Do not wear contact lenses, false eyelashes or make-up.  Bring a case for your glasses.   • Bring crutches or walker if applicable.  • Remove all piercings.  Leave jewelry and any other valuables at home.  • Hair extensions with metal clips must be removed prior to surgery.  • The  Pre-Admission Testing nurse will instruct you to bring medications if unable to obtain an accurate list in Pre-Admission Testing.        If you were given a blood bank ID arm band remember to bring it with you the day of surgery.    Preventing a Surgical Site Infection:  • For 2 to 3 days before surgery, avoid shaving with a razor because the razor can irritate skin and make it easier to develop an infection.    • Any areas of open skin can increase the risk of a post-operative wound infection by allowing bacteria to enter and travel throughout the body.  Notify your surgeon if you have any skin wounds / rashes even if it is not near the expected surgical site.  The area will need assessed to determine if surgery should be delayed until it is healed.  • The night prior to surgery sleep in a clean bed with clean clothing.  Do not allow pets to sleep with you.  • Shower on the morning of surgery using a fresh bar of anti-bacterial soap (such as Dial) and clean washcloth.  Dry with a clean towel and dress in clean clothing.  • Ask your surgeon if you will be receiving antibiotics prior to surgery.  • Make sure you, your family, and all healthcare providers clean their hands with soap and water or an alcohol based hand  before caring for you or your wound.    Day of surgery:  Your arrival time is approximately two hours before your scheduled surgery time.  Upon arrival, a Pre-op nurse and Anesthesiologist will review your health history, obtain vital signs, and answer questions you may have.  The only belongings needed at this time will be a list of your home medications and if applicable your C-PAP/BI-PAP machine.  If you are staying overnight your family can leave the rest of your belongings in the car and bring them to your room later.  A Pre-op nurse will start an IV and you may receive medication in preparation for surgery, including something to help you relax.  Your family will be able to see you in the  Pre-op area.  Two visitors at a time will be allowed in the Pre-op room.  While you are in surgery your family should notify the waiting room  if they leave the waiting room area and provide a contact phone number.    Please be aware that surgery does come with discomfort.  We want to make every effort to control your discomfort so please discuss any uncontrolled symptoms with your nurse.   Your doctor will most likely have prescribed pain medications.      If you are going home after surgery you will receive individualized written care instructions before being discharged.  A responsible adult must drive you to and from the hospital on the day of your surgery and stay with you for 24 hours.    If you are staying overnight following surgery, you will be transported to your hospital room following the recovery period.  [facility] has all private rooms.    You have received a list of surgical assistants for your reference.  If you have any questions please call Pre-Admission Testing at [phone].  Deductibles and co-payments are collected on the day of service. Please be prepared to pay the required co-pay, deductible or deposit on the day of service as defined by your plan.    2% CHLORAHEXIDINE GLUCONATE* CLOTH  Preparing or “prepping” skin before surgery can reduce the risk of infection at the surgical site. To make the process easier, [facility] has chosen disposable cloths moistened with a rinse-free, 2% Chlorhexidine Gluconate (CHG) antiseptic solution. The steps below outline the prepping process and should be carefully followed.        Use the prep cloth on the area that is circled in the diagram             Directions Night before Surgery  1) Shower using a fresh bar of anti-bacterial soap (such as Dial) and clean washcloth.  Use a clean towel to completely dry your skin.  2) Do not use any lotions, oils or creams on your skin.  3) Open the package and remove 1 cloth,  wipe your skin for 30 seconds in a circular motion.  Allow to dry for 3 minutes.  4) Repeat #3 with second cloth.  5) Do not touch your eyes, ears, or mouth with the prep cloth.  6) Allow the wet prep solution to air dry.  7) Discard the prep cloth and wash your hands with soap and water.   8) Dress in clean bed clothes and sleep on fresh clean bed sheets.   9) You may experience some temporary itching after the prep.    Directions Day of Surgery  1) Repeat steps 1,2,3,4,5,6,7, and 9.   2) Dress in clean clothes before coming to the hospital.

## 2019-12-02 ENCOUNTER — HOSPITAL ENCOUNTER (INPATIENT)
Facility: HOSPITAL | Age: 39
LOS: 5 days | Discharge: HOME HEALTH | End: 2019-12-07
Attending: COLON & RECTAL SURGERY | Admitting: COLON & RECTAL SURGERY

## 2019-12-02 ENCOUNTER — ANESTHESIA (OUTPATIENT)
Dept: PERIOP | Facility: HOSPITAL | Age: 39
End: 2019-12-02

## 2019-12-02 ENCOUNTER — ANESTHESIA EVENT (OUTPATIENT)
Dept: PERIOP | Facility: HOSPITAL | Age: 39
End: 2019-12-02

## 2019-12-02 DIAGNOSIS — C20 RECTAL CANCER (HCC): ICD-10-CM

## 2019-12-02 LAB — HCG SERPL QL: NEGATIVE

## 2019-12-02 PROCEDURE — 25010000002 HYDRALAZINE PER 20 MG: Performed by: NURSE ANESTHETIST, CERTIFIED REGISTERED

## 2019-12-02 PROCEDURE — 88305 TISSUE EXAM BY PATHOLOGIST: CPT | Performed by: COLON & RECTAL SURGERY

## 2019-12-02 PROCEDURE — 25010000002 HYDROMORPHONE PER 4 MG: Performed by: NURSE ANESTHETIST, CERTIFIED REGISTERED

## 2019-12-02 PROCEDURE — 25010000002 PROPOFOL 10 MG/ML EMULSION: Performed by: NURSE ANESTHETIST, CERTIFIED REGISTERED

## 2019-12-02 PROCEDURE — 25010000002 ENOXAPARIN PER 10 MG: Performed by: COLON & RECTAL SURGERY

## 2019-12-02 PROCEDURE — 44213 LAP MOBIL SPLENIC FL ADD-ON: CPT | Performed by: PHYSICIAN ASSISTANT

## 2019-12-02 PROCEDURE — 25010000003 CEFAZOLIN IN DEXTROSE 2-4 GM/100ML-% SOLUTION: Performed by: PHYSICIAN ASSISTANT

## 2019-12-02 PROCEDURE — 44207 L COLECTOMY/COLOPROCTOSTOMY: CPT | Performed by: PHYSICIAN ASSISTANT

## 2019-12-02 PROCEDURE — 88309 TISSUE EXAM BY PATHOLOGIST: CPT | Performed by: COLON & RECTAL SURGERY

## 2019-12-02 PROCEDURE — 25010000002 DEXAMETHASONE PER 1 MG: Performed by: NURSE ANESTHETIST, CERTIFIED REGISTERED

## 2019-12-02 PROCEDURE — 25010000003 AMPICILLIN-SULBACTAM PER 1.5 G: Performed by: COLON & RECTAL SURGERY

## 2019-12-02 PROCEDURE — 25010000002 MAGNESIUM SULFATE PER 500 MG OF MAGNESIUM: Performed by: NURSE ANESTHETIST, CERTIFIED REGISTERED

## 2019-12-02 PROCEDURE — 0DTN0ZZ RESECTION OF SIGMOID COLON, OPEN APPROACH: ICD-10-PCS | Performed by: COLON & RECTAL SURGERY

## 2019-12-02 PROCEDURE — 0D1B0Z4 BYPASS ILEUM TO CUTANEOUS, OPEN APPROACH: ICD-10-PCS | Performed by: COLON & RECTAL SURGERY

## 2019-12-02 PROCEDURE — 25010000002 FENTANYL CITRATE (PF) 100 MCG/2ML SOLUTION: Performed by: NURSE ANESTHETIST, CERTIFIED REGISTERED

## 2019-12-02 PROCEDURE — 44187 LAP ILEO/JEJUNO-STOMY: CPT | Performed by: PHYSICIAN ASSISTANT

## 2019-12-02 PROCEDURE — 07TB0ZZ RESECTION OF MESENTERIC LYMPHATIC, OPEN APPROACH: ICD-10-PCS | Performed by: COLON & RECTAL SURGERY

## 2019-12-02 PROCEDURE — 25010000002 LIDOCAINE PER 10 MG: Performed by: NURSE ANESTHETIST, CERTIFIED REGISTERED

## 2019-12-02 PROCEDURE — 25010000002 ONDANSETRON PER 1 MG: Performed by: NURSE ANESTHETIST, CERTIFIED REGISTERED

## 2019-12-02 PROCEDURE — C1765 ADHESION BARRIER: HCPCS | Performed by: COLON & RECTAL SURGERY

## 2019-12-02 PROCEDURE — 84703 CHORIONIC GONADOTROPIN ASSAY: CPT | Performed by: COLON & RECTAL SURGERY

## 2019-12-02 PROCEDURE — C9290 INJ, BUPIVACAINE LIPOSOME: HCPCS | Performed by: ANESTHESIOLOGY

## 2019-12-02 PROCEDURE — 25010000003 BUPIVACAINE LIPOSOME 1.3 % SUSPENSION: Performed by: ANESTHESIOLOGY

## 2019-12-02 PROCEDURE — 25010000002 MIDAZOLAM PER 1 MG: Performed by: NURSE ANESTHETIST, CERTIFIED REGISTERED

## 2019-12-02 PROCEDURE — 25010000002 HYDROMORPHONE PER 4 MG: Performed by: COLON & RECTAL SURGERY

## 2019-12-02 PROCEDURE — 0DJD4ZZ INSPECTION OF LOWER INTESTINAL TRACT, PERCUTANEOUS ENDOSCOPIC APPROACH: ICD-10-PCS | Performed by: COLON & RECTAL SURGERY

## 2019-12-02 PROCEDURE — 44207 L COLECTOMY/COLOPROCTOSTOMY: CPT | Performed by: COLON & RECTAL SURGERY

## 2019-12-02 PROCEDURE — 25010000002 MIDAZOLAM PER 1 MG: Performed by: ANESTHESIOLOGY

## 2019-12-02 PROCEDURE — 44187 LAP ILEO/JEJUNO-STOMY: CPT | Performed by: COLON & RECTAL SURGERY

## 2019-12-02 PROCEDURE — 44213 LAP MOBIL SPLENIC FL ADD-ON: CPT | Performed by: COLON & RECTAL SURGERY

## 2019-12-02 PROCEDURE — 25010000002 ONDANSETRON PER 1 MG: Performed by: COLON & RECTAL SURGERY

## 2019-12-02 PROCEDURE — 25010000002 PHENYLEPHRINE PER 1 ML: Performed by: NURSE ANESTHETIST, CERTIFIED REGISTERED

## 2019-12-02 DEVICE — PROXIMATE RELOADABLE LINEAR CUTTER WITH SAFETY LOCK-OUT, 75MM
Type: IMPLANTABLE DEVICE | Site: ABDOMEN | Status: FUNCTIONAL
Brand: PROXIMATE

## 2019-12-02 DEVICE — SEALANT WND FIBRIN TISSEEL PREFIL/SYR/PRIMAFZ 10ML: Type: IMPLANTABLE DEVICE | Site: ABDOMEN | Status: FUNCTIONAL

## 2019-12-02 DEVICE — CONTOUR CURVED CUTTER STAPLER
Type: IMPLANTABLE DEVICE | Status: FUNCTIONAL
Brand: CONTOUR

## 2019-12-02 DEVICE — CELLULAR STAPLER WITH TRI-STAPLE TECHNOLOGY
Type: IMPLANTABLE DEVICE | Status: FUNCTIONAL
Brand: EEA

## 2019-12-02 RX ORDER — ACETAMINOPHEN 500 MG
1000 TABLET ORAL EVERY 6 HOURS
Status: DISCONTINUED | OUTPATIENT
Start: 2019-12-02 | End: 2019-12-07 | Stop reason: HOSPADM

## 2019-12-02 RX ORDER — GABAPENTIN 300 MG/1
600 CAPSULE ORAL ONCE
Status: COMPLETED | OUTPATIENT
Start: 2019-12-02 | End: 2019-12-02

## 2019-12-02 RX ORDER — MIDAZOLAM HYDROCHLORIDE 1 MG/ML
INJECTION INTRAMUSCULAR; INTRAVENOUS AS NEEDED
Status: DISCONTINUED | OUTPATIENT
Start: 2019-12-02 | End: 2019-12-02 | Stop reason: SURG

## 2019-12-02 RX ORDER — MIDAZOLAM HYDROCHLORIDE 1 MG/ML
1 INJECTION INTRAMUSCULAR; INTRAVENOUS
Status: DISCONTINUED | OUTPATIENT
Start: 2019-12-02 | End: 2019-12-02 | Stop reason: HOSPADM

## 2019-12-02 RX ORDER — CEFAZOLIN SODIUM 2 G/100ML
2 INJECTION, SOLUTION INTRAVENOUS ONCE
Status: COMPLETED | OUTPATIENT
Start: 2019-12-02 | End: 2019-12-02

## 2019-12-02 RX ORDER — HYDROMORPHONE HYDROCHLORIDE 1 MG/ML
0.25 INJECTION, SOLUTION INTRAMUSCULAR; INTRAVENOUS; SUBCUTANEOUS
Status: DISCONTINUED | OUTPATIENT
Start: 2019-12-02 | End: 2019-12-07 | Stop reason: HOSPADM

## 2019-12-02 RX ORDER — KETAMINE HYDROCHLORIDE 10 MG/ML
INJECTION INTRAMUSCULAR; INTRAVENOUS AS NEEDED
Status: DISCONTINUED | OUTPATIENT
Start: 2019-12-02 | End: 2019-12-02 | Stop reason: SURG

## 2019-12-02 RX ORDER — NITROGLYCERIN 0.4 MG/1
0.4 TABLET SUBLINGUAL
Status: DISCONTINUED | OUTPATIENT
Start: 2019-12-02 | End: 2019-12-07 | Stop reason: HOSPADM

## 2019-12-02 RX ORDER — ACETAMINOPHEN 500 MG
1000 TABLET ORAL EVERY 6 HOURS
Status: DISCONTINUED | OUTPATIENT
Start: 2019-12-02 | End: 2019-12-02 | Stop reason: HOSPADM

## 2019-12-02 RX ORDER — FAMOTIDINE 10 MG/ML
20 INJECTION, SOLUTION INTRAVENOUS EVERY 12 HOURS SCHEDULED
Status: DISCONTINUED | OUTPATIENT
Start: 2019-12-02 | End: 2019-12-07 | Stop reason: HOSPADM

## 2019-12-02 RX ORDER — ACETAMINOPHEN 10 MG/ML
1000 INJECTION, SOLUTION INTRAVENOUS ONCE
Status: COMPLETED | OUTPATIENT
Start: 2019-12-02 | End: 2019-12-02

## 2019-12-02 RX ORDER — ONDANSETRON 2 MG/ML
INJECTION INTRAMUSCULAR; INTRAVENOUS AS NEEDED
Status: DISCONTINUED | OUTPATIENT
Start: 2019-12-02 | End: 2019-12-02 | Stop reason: SURG

## 2019-12-02 RX ORDER — LABETALOL HYDROCHLORIDE 5 MG/ML
INJECTION, SOLUTION INTRAVENOUS AS NEEDED
Status: DISCONTINUED | OUTPATIENT
Start: 2019-12-02 | End: 2019-12-02 | Stop reason: SURG

## 2019-12-02 RX ORDER — NALOXONE HCL 0.4 MG/ML
0.4 VIAL (ML) INJECTION
Status: DISCONTINUED | OUTPATIENT
Start: 2019-12-02 | End: 2019-12-02 | Stop reason: HOSPADM

## 2019-12-02 RX ORDER — ALVIMOPAN 12 MG/1
12 CAPSULE ORAL ONCE
Status: COMPLETED | OUTPATIENT
Start: 2019-12-02 | End: 2019-12-02

## 2019-12-02 RX ORDER — CELECOXIB 200 MG/1
200 CAPSULE ORAL ONCE
Status: COMPLETED | OUTPATIENT
Start: 2019-12-02 | End: 2019-12-02

## 2019-12-02 RX ORDER — SODIUM CHLORIDE 0.9 % (FLUSH) 0.9 %
3 SYRINGE (ML) INJECTION EVERY 12 HOURS SCHEDULED
Status: DISCONTINUED | OUTPATIENT
Start: 2019-12-02 | End: 2019-12-02 | Stop reason: HOSPADM

## 2019-12-02 RX ORDER — CELECOXIB 200 MG/1
200 CAPSULE ORAL EVERY 12 HOURS SCHEDULED
Status: COMPLETED | OUTPATIENT
Start: 2019-12-02 | End: 2019-12-05

## 2019-12-02 RX ORDER — ONDANSETRON 2 MG/ML
4 INJECTION INTRAMUSCULAR; INTRAVENOUS EVERY 6 HOURS PRN
Status: DISCONTINUED | OUTPATIENT
Start: 2019-12-02 | End: 2019-12-07 | Stop reason: HOSPADM

## 2019-12-02 RX ORDER — FENTANYL CITRATE 50 UG/ML
INJECTION, SOLUTION INTRAMUSCULAR; INTRAVENOUS AS NEEDED
Status: DISCONTINUED | OUTPATIENT
Start: 2019-12-02 | End: 2019-12-02 | Stop reason: SURG

## 2019-12-02 RX ORDER — METOPROLOL TARTRATE 5 MG/5ML
INJECTION INTRAVENOUS AS NEEDED
Status: DISCONTINUED | OUTPATIENT
Start: 2019-12-02 | End: 2019-12-02 | Stop reason: SURG

## 2019-12-02 RX ORDER — HYDROMORPHONE HYDROCHLORIDE 1 MG/ML
0.25 INJECTION, SOLUTION INTRAMUSCULAR; INTRAVENOUS; SUBCUTANEOUS
Status: DISCONTINUED | OUTPATIENT
Start: 2019-12-02 | End: 2019-12-02 | Stop reason: HOSPADM

## 2019-12-02 RX ORDER — SODIUM CHLORIDE, SODIUM LACTATE, POTASSIUM CHLORIDE, CALCIUM CHLORIDE 600; 310; 30; 20 MG/100ML; MG/100ML; MG/100ML; MG/100ML
9 INJECTION, SOLUTION INTRAVENOUS CONTINUOUS
Status: DISCONTINUED | OUTPATIENT
Start: 2019-12-02 | End: 2019-12-02

## 2019-12-02 RX ORDER — LIDOCAINE HYDROCHLORIDE ANHYDROUS AND DEXTROSE MONOHYDRATE 5; 400 G/100ML; MG/100ML
INJECTION, SOLUTION INTRAVENOUS CONTINUOUS PRN
Status: DISCONTINUED | OUTPATIENT
Start: 2019-12-02 | End: 2019-12-02 | Stop reason: SURG

## 2019-12-02 RX ORDER — MIDAZOLAM HYDROCHLORIDE 1 MG/ML
2 INJECTION INTRAMUSCULAR; INTRAVENOUS
Status: DISCONTINUED | OUTPATIENT
Start: 2019-12-02 | End: 2019-12-02 | Stop reason: HOSPADM

## 2019-12-02 RX ORDER — ESCITALOPRAM OXALATE 20 MG/1
20 TABLET ORAL DAILY
Status: DISCONTINUED | OUTPATIENT
Start: 2019-12-03 | End: 2019-12-07 | Stop reason: HOSPADM

## 2019-12-02 RX ORDER — ALVIMOPAN 12 MG/1
12 CAPSULE ORAL 2 TIMES DAILY
Status: DISCONTINUED | OUTPATIENT
Start: 2019-12-03 | End: 2019-12-02

## 2019-12-02 RX ORDER — BUPIVACAINE HYDROCHLORIDE AND EPINEPHRINE 2.5; 5 MG/ML; UG/ML
INJECTION, SOLUTION EPIDURAL; INFILTRATION; INTRACAUDAL; PERINEURAL
Status: COMPLETED | OUTPATIENT
Start: 2019-12-02 | End: 2019-12-02

## 2019-12-02 RX ORDER — SODIUM CHLORIDE 9 MG/ML
INJECTION, SOLUTION INTRAVENOUS CONTINUOUS PRN
Status: DISCONTINUED | OUTPATIENT
Start: 2019-12-02 | End: 2019-12-02 | Stop reason: SURG

## 2019-12-02 RX ORDER — METOCLOPRAMIDE HYDROCHLORIDE 5 MG/ML
10 INJECTION INTRAMUSCULAR; INTRAVENOUS ONCE AS NEEDED
Status: DISCONTINUED | OUTPATIENT
Start: 2019-12-02 | End: 2019-12-02 | Stop reason: HOSPADM

## 2019-12-02 RX ORDER — SODIUM CHLORIDE, SODIUM LACTATE, POTASSIUM CHLORIDE, CALCIUM CHLORIDE 600; 310; 30; 20 MG/100ML; MG/100ML; MG/100ML; MG/100ML
125 INJECTION, SOLUTION INTRAVENOUS CONTINUOUS
Status: DISCONTINUED | OUTPATIENT
Start: 2019-12-02 | End: 2019-12-04

## 2019-12-02 RX ORDER — LIDOCAINE HYDROCHLORIDE 20 MG/ML
INJECTION, SOLUTION INFILTRATION; PERINEURAL AS NEEDED
Status: DISCONTINUED | OUTPATIENT
Start: 2019-12-02 | End: 2019-12-02 | Stop reason: SURG

## 2019-12-02 RX ORDER — ENALAPRILAT 2.5 MG/2ML
0.62 INJECTION INTRAVENOUS EVERY 6 HOURS PRN
Status: DISCONTINUED | OUTPATIENT
Start: 2019-12-02 | End: 2019-12-04

## 2019-12-02 RX ORDER — NALOXONE HCL 0.4 MG/ML
0.4 VIAL (ML) INJECTION
Status: DISCONTINUED | OUTPATIENT
Start: 2019-12-02 | End: 2019-12-07 | Stop reason: HOSPADM

## 2019-12-02 RX ORDER — MAGNESIUM SULFATE HEPTAHYDRATE 500 MG/ML
INJECTION, SOLUTION INTRAMUSCULAR; INTRAVENOUS AS NEEDED
Status: DISCONTINUED | OUTPATIENT
Start: 2019-12-02 | End: 2019-12-02 | Stop reason: SURG

## 2019-12-02 RX ORDER — MAGNESIUM HYDROXIDE 1200 MG/15ML
LIQUID ORAL AS NEEDED
Status: DISCONTINUED | OUTPATIENT
Start: 2019-12-02 | End: 2019-12-02 | Stop reason: HOSPADM

## 2019-12-02 RX ORDER — FAMOTIDINE 10 MG/ML
20 INJECTION, SOLUTION INTRAVENOUS ONCE
Status: COMPLETED | OUTPATIENT
Start: 2019-12-02 | End: 2019-12-02

## 2019-12-02 RX ORDER — GABAPENTIN 300 MG/1
600 CAPSULE ORAL 2 TIMES DAILY
Status: DISCONTINUED | OUTPATIENT
Start: 2019-12-02 | End: 2019-12-04

## 2019-12-02 RX ORDER — PROPOFOL 10 MG/ML
VIAL (ML) INTRAVENOUS AS NEEDED
Status: DISCONTINUED | OUTPATIENT
Start: 2019-12-02 | End: 2019-12-02 | Stop reason: SURG

## 2019-12-02 RX ORDER — ESMOLOL HYDROCHLORIDE 10 MG/ML
INJECTION INTRAVENOUS AS NEEDED
Status: DISCONTINUED | OUTPATIENT
Start: 2019-12-02 | End: 2019-12-02 | Stop reason: SURG

## 2019-12-02 RX ORDER — GLYCOPYRROLATE 0.2 MG/ML
INJECTION INTRAMUSCULAR; INTRAVENOUS AS NEEDED
Status: DISCONTINUED | OUTPATIENT
Start: 2019-12-02 | End: 2019-12-02 | Stop reason: SURG

## 2019-12-02 RX ORDER — ONDANSETRON 2 MG/ML
4 INJECTION INTRAMUSCULAR; INTRAVENOUS ONCE AS NEEDED
Status: DISCONTINUED | OUTPATIENT
Start: 2019-12-02 | End: 2019-12-02 | Stop reason: HOSPADM

## 2019-12-02 RX ORDER — HYDRALAZINE HYDROCHLORIDE 20 MG/ML
INJECTION INTRAMUSCULAR; INTRAVENOUS AS NEEDED
Status: DISCONTINUED | OUTPATIENT
Start: 2019-12-02 | End: 2019-12-02 | Stop reason: SURG

## 2019-12-02 RX ORDER — DEXAMETHASONE SODIUM PHOSPHATE 10 MG/ML
INJECTION INTRAMUSCULAR; INTRAVENOUS AS NEEDED
Status: DISCONTINUED | OUTPATIENT
Start: 2019-12-02 | End: 2019-12-02 | Stop reason: SURG

## 2019-12-02 RX ORDER — ROCURONIUM BROMIDE 10 MG/ML
INJECTION, SOLUTION INTRAVENOUS AS NEEDED
Status: DISCONTINUED | OUTPATIENT
Start: 2019-12-02 | End: 2019-12-02 | Stop reason: SURG

## 2019-12-02 RX ORDER — SODIUM CHLORIDE 0.9 % (FLUSH) 0.9 %
3-10 SYRINGE (ML) INJECTION AS NEEDED
Status: DISCONTINUED | OUTPATIENT
Start: 2019-12-02 | End: 2019-12-02 | Stop reason: HOSPADM

## 2019-12-02 RX ORDER — LIDOCAINE HYDROCHLORIDE 10 MG/ML
0.5 INJECTION, SOLUTION EPIDURAL; INFILTRATION; INTRACAUDAL; PERINEURAL ONCE AS NEEDED
Status: DISCONTINUED | OUTPATIENT
Start: 2019-12-02 | End: 2019-12-02 | Stop reason: HOSPADM

## 2019-12-02 RX ORDER — BUPIVACAINE HYDROCHLORIDE AND EPINEPHRINE 2.5; 5 MG/ML; UG/ML
INJECTION, SOLUTION INFILTRATION; PERINEURAL AS NEEDED
Status: DISCONTINUED | OUTPATIENT
Start: 2019-12-02 | End: 2019-12-02 | Stop reason: HOSPADM

## 2019-12-02 RX ORDER — FENTANYL CITRATE 50 UG/ML
50 INJECTION, SOLUTION INTRAMUSCULAR; INTRAVENOUS
Status: DISCONTINUED | OUTPATIENT
Start: 2019-12-02 | End: 2019-12-02 | Stop reason: HOSPADM

## 2019-12-02 RX ADMIN — ACETAMINOPHEN 1000 MG: 500 TABLET, FILM COATED ORAL at 08:51

## 2019-12-02 RX ADMIN — LIDOCAINE HYDROCHLORIDE ANHYDROUS AND DEXTROSE MONOHYDRATE 2 MG/MIN: .4; 5 INJECTION, SOLUTION INTRAVENOUS at 09:45

## 2019-12-02 RX ADMIN — KETAMINE HYDROCHLORIDE 10 MG: 10 INJECTION INTRAMUSCULAR; INTRAVENOUS at 11:46

## 2019-12-02 RX ADMIN — MIDAZOLAM 2 MG: 1 INJECTION INTRAMUSCULAR; INTRAVENOUS at 08:57

## 2019-12-02 RX ADMIN — KETAMINE HYDROCHLORIDE 50 MG: 10 INJECTION INTRAMUSCULAR; INTRAVENOUS at 09:45

## 2019-12-02 RX ADMIN — CELECOXIB 200 MG: 200 CAPSULE ORAL at 08:51

## 2019-12-02 RX ADMIN — CELECOXIB 200 MG: 200 CAPSULE ORAL at 22:00

## 2019-12-02 RX ADMIN — METRONIDAZOLE 500 MG: 500 INJECTION, SOLUTION INTRAVENOUS at 09:10

## 2019-12-02 RX ADMIN — HYDROMORPHONE HYDROCHLORIDE 0.25 MG: 10 INJECTION INTRAMUSCULAR; INTRAVENOUS; SUBCUTANEOUS at 20:21

## 2019-12-02 RX ADMIN — ROCURONIUM BROMIDE 50 MG: 10 INJECTION INTRAVENOUS at 09:39

## 2019-12-02 RX ADMIN — ONDANSETRON 4 MG: 2 INJECTION INTRAMUSCULAR; INTRAVENOUS at 21:45

## 2019-12-02 RX ADMIN — PHENYLEPHRINE HYDROCHLORIDE 100 MCG: 10 INJECTION INTRAVENOUS at 10:03

## 2019-12-02 RX ADMIN — HYDROMORPHONE HYDROCHLORIDE 0.25 MG: 10 INJECTION INTRAMUSCULAR; INTRAVENOUS; SUBCUTANEOUS at 23:37

## 2019-12-02 RX ADMIN — ONDANSETRON 4 MG: 2 INJECTION INTRAMUSCULAR; INTRAVENOUS at 17:02

## 2019-12-02 RX ADMIN — ALVIMOPAN 12 MG: 12 CAPSULE ORAL at 08:51

## 2019-12-02 RX ADMIN — ESMOLOL HYDROCHLORIDE 20 MG: 10 INJECTION, SOLUTION INTRAVENOUS at 10:54

## 2019-12-02 RX ADMIN — PROPOFOL 200 MG: 10 INJECTION, EMULSION INTRAVENOUS at 09:38

## 2019-12-02 RX ADMIN — FAMOTIDINE 20 MG: 10 INJECTION INTRAVENOUS at 23:58

## 2019-12-02 RX ADMIN — MAGNESIUM SULFATE HEPTAHYDRATE 1 G: 500 INJECTION, SOLUTION INTRAMUSCULAR; INTRAVENOUS at 09:59

## 2019-12-02 RX ADMIN — ROCURONIUM BROMIDE 10 MG: 10 INJECTION INTRAVENOUS at 11:46

## 2019-12-02 RX ADMIN — Medication 2 MG: at 09:34

## 2019-12-02 RX ADMIN — LABETALOL HYDROCHLORIDE 10 MG: 5 INJECTION, SOLUTION INTRAVENOUS at 13:09

## 2019-12-02 RX ADMIN — DEXAMETHASONE SODIUM PHOSPHATE 8 MG: 10 INJECTION INTRAMUSCULAR; INTRAVENOUS at 09:45

## 2019-12-02 RX ADMIN — METOPROLOL TARTRATE 3 MG: 1 INJECTION, SOLUTION INTRAVENOUS at 10:37

## 2019-12-02 RX ADMIN — BUPIVACAINE 20 ML: 13.3 INJECTION, SUSPENSION, LIPOSOMAL INFILTRATION at 09:44

## 2019-12-02 RX ADMIN — KETAMINE HYDROCHLORIDE 10 MG: 10 INJECTION INTRAMUSCULAR; INTRAVENOUS at 10:45

## 2019-12-02 RX ADMIN — ACETAMINOPHEN 1000 MG: 10 INJECTION, SOLUTION INTRAVENOUS at 14:36

## 2019-12-02 RX ADMIN — SODIUM CHLORIDE, POTASSIUM CHLORIDE, SODIUM LACTATE AND CALCIUM CHLORIDE 9 ML/HR: 600; 310; 30; 20 INJECTION, SOLUTION INTRAVENOUS at 08:51

## 2019-12-02 RX ADMIN — BUPIVACAINE HYDROCHLORIDE AND EPINEPHRINE BITARTRATE 10 ML: 2.5; .0091 INJECTION, SOLUTION EPIDURAL; INFILTRATION; INTRACAUDAL; PERINEURAL at 09:44

## 2019-12-02 RX ADMIN — ROCURONIUM BROMIDE 10 MG: 10 INJECTION INTRAVENOUS at 11:12

## 2019-12-02 RX ADMIN — SODIUM CHLORIDE: 9 INJECTION, SOLUTION INTRAVENOUS at 09:45

## 2019-12-02 RX ADMIN — METOPROLOL TARTRATE 2 MG: 1 INJECTION, SOLUTION INTRAVENOUS at 10:44

## 2019-12-02 RX ADMIN — FENTANYL CITRATE 100 MCG: 50 INJECTION INTRAMUSCULAR; INTRAVENOUS at 09:34

## 2019-12-02 RX ADMIN — HYDROMORPHONE HYDROCHLORIDE 0.25 MG: 1 INJECTION, SOLUTION INTRAMUSCULAR; INTRAVENOUS; SUBCUTANEOUS at 15:46

## 2019-12-02 RX ADMIN — HYDROMORPHONE HYDROCHLORIDE 0.25 MG: 10 INJECTION INTRAMUSCULAR; INTRAVENOUS; SUBCUTANEOUS at 17:03

## 2019-12-02 RX ADMIN — HYDROMORPHONE HYDROCHLORIDE 0.25 MG: 1 INJECTION, SOLUTION INTRAMUSCULAR; INTRAVENOUS; SUBCUTANEOUS at 15:15

## 2019-12-02 RX ADMIN — LABETALOL HYDROCHLORIDE 10 MG: 5 INJECTION, SOLUTION INTRAVENOUS at 10:58

## 2019-12-02 RX ADMIN — FAMOTIDINE 20 MG: 10 INJECTION INTRAVENOUS at 08:57

## 2019-12-02 RX ADMIN — SUGAMMADEX 200 MG: 100 INJECTION, SOLUTION INTRAVENOUS at 13:08

## 2019-12-02 RX ADMIN — ENOXAPARIN SODIUM 40 MG: 40 INJECTION SUBCUTANEOUS at 20:21

## 2019-12-02 RX ADMIN — KETAMINE HYDROCHLORIDE 10 MG: 10 INJECTION INTRAMUSCULAR; INTRAVENOUS at 12:48

## 2019-12-02 RX ADMIN — HYDRALAZINE HYDROCHLORIDE 10 MG: 20 INJECTION INTRAMUSCULAR; INTRAVENOUS at 11:25

## 2019-12-02 RX ADMIN — HYDROMORPHONE HYDROCHLORIDE 0.25 MG: 1 INJECTION, SOLUTION INTRAMUSCULAR; INTRAVENOUS; SUBCUTANEOUS at 14:45

## 2019-12-02 RX ADMIN — ROCURONIUM BROMIDE 10 MG: 10 INJECTION INTRAVENOUS at 10:44

## 2019-12-02 RX ADMIN — MAGNESIUM SULFATE HEPTAHYDRATE 1 G: 500 INJECTION, SOLUTION INTRAMUSCULAR; INTRAVENOUS at 09:54

## 2019-12-02 RX ADMIN — GABAPENTIN 600 MG: 300 CAPSULE ORAL at 08:51

## 2019-12-02 RX ADMIN — SODIUM CHLORIDE, POTASSIUM CHLORIDE, SODIUM LACTATE AND CALCIUM CHLORIDE: 600; 310; 30; 20 INJECTION, SOLUTION INTRAVENOUS at 12:47

## 2019-12-02 RX ADMIN — GLYCOPYRROLATE 0.1 MG: 0.2 INJECTION INTRAMUSCULAR; INTRAVENOUS at 09:45

## 2019-12-02 RX ADMIN — LIDOCAINE HYDROCHLORIDE 100 MG: 20 INJECTION, SOLUTION INFILTRATION; PERINEURAL at 09:38

## 2019-12-02 RX ADMIN — ONDANSETRON 4 MG: 2 INJECTION INTRAMUSCULAR; INTRAVENOUS at 12:38

## 2019-12-02 RX ADMIN — GABAPENTIN 600 MG: 300 CAPSULE ORAL at 22:01

## 2019-12-02 RX ADMIN — ACETAMINOPHEN 1000 MG: 10 INJECTION, SOLUTION INTRAVENOUS at 21:45

## 2019-12-02 RX ADMIN — CEFAZOLIN SODIUM 2 G: 2 INJECTION, SOLUTION INTRAVENOUS at 09:37

## 2019-12-02 RX ADMIN — AMPICILLIN SODIUM AND SULBACTAM SODIUM 3 G: 2; 1 INJECTION, POWDER, FOR SOLUTION INTRAMUSCULAR; INTRAVENOUS at 14:41

## 2019-12-02 RX ADMIN — HYDROMORPHONE HYDROCHLORIDE 0.25 MG: 1 INJECTION, SOLUTION INTRAMUSCULAR; INTRAVENOUS; SUBCUTANEOUS at 14:10

## 2019-12-02 RX ADMIN — AMPICILLIN SODIUM AND SULBACTAM SODIUM 3 G: 2; 1 INJECTION, POWDER, FOR SOLUTION INTRAMUSCULAR; INTRAVENOUS at 22:00

## 2019-12-02 NOTE — ANESTHESIA PROCEDURE NOTES
Peripheral Block    Pre-sedation assessment completed: 12/2/2019 9:44 AM    Patient location during procedure: OR  Start time: 12/2/2019 9:44 AM  Stop time: 12/2/2019 9:49 AM  Reason for block: at surgeon's request and post-op pain management  Performed by  Anesthesiologist: Rvai Bryan MD  Preanesthetic Checklist  Completed: patient identified, site marked, surgical consent, pre-op evaluation, timeout performed, IV checked, risks and benefits discussed and monitors and equipment checked  Prep:  Sterile barriers:cap, gloves, gown, mask and sterile barriers  Prep: ChloraPrep  Patient monitoring: blood pressure monitoring, continuous pulse oximetry and EKG  Procedure  Sedation:yes    Guidance:ultrasound guided  ULTRASOUND INTERPRETATION. Using ultrasound guidance a 21 G gauge needle was placed in close proximity to the nerve, at which point, under ultrasound guidance anesthetic was injected in the area of the nerve and spread of the anesthesia was seen on ultrasound in close proximity thereto.  There were no abnormalities seen on ultrasound; a digital image was taken; and the patient tolerated the procedure with no complications. Images:still images obtained    Laterality:Bilateral  Block Type:TAP  Injection Technique:single-shot  Needle Type:echogenic  Needle Gauge:22 G      Medications Used: bupivacaine-EPINEPHrine PF (MARCAINE w/EPI) 0.25% -[id number] injection, 10 mL  bupivacaine liposome (EXPAREL) 1.3 % injection, 20 mL      Post Assessment  Injection Assessment: negative aspiration for heme, no paresthesia on injection and incremental injection  Patient Tolerance:comfortable throughout block  Complications:no

## 2019-12-02 NOTE — PLAN OF CARE
Problem: Patient Care Overview  Goal: Plan of Care Review  Outcome: Ongoing (interventions implemented as appropriate)   12/02/19 7049   Coping/Psychosocial   Plan of Care Reviewed With patient   Plan of Care Review   Progress no change   OTHER   Outcome Summary Admit from PACU, s/p colon resection with ileostomy placement. Urge and +BM upon arrival to unit, up to BSC. Lots of c/o pain, IV Dilaudid given x1, but educated on ERAS protocol, pt verbalized understanding. [place], MAGAN drain in place. Midline incision with lapx3 ARDEN with dermabond. Ostomy with liquid brown output with sediment. Family at bedside.     Goal: Individualization and Mutuality  Outcome: Ongoing (interventions implemented as appropriate)    Goal: Discharge Needs Assessment  Outcome: Ongoing (interventions implemented as appropriate)    Goal: Interprofessional Rounds/Family Conf  Outcome: Ongoing (interventions implemented as appropriate)      Problem: Surgery Nonspecified (Adult)  Goal: Signs and Symptoms of Listed Potential Problems Will be Absent, Minimized or Managed (Surgery Nonspecified)  Outcome: Ongoing (interventions implemented as appropriate)    Goal: Anesthesia/Sedation Recovery  Outcome: Outcome(s) achieved Date Met: 12/02/19      Problem: Pain, Acute (Adult)  Goal: Identify Related Risk Factors and Signs and Symptoms  Outcome: Outcome(s) achieved Date Met: 12/02/19    Goal: Acceptable Pain Control/Comfort Level  Outcome: Ongoing (interventions implemented as appropriate)      Problem: Ileostomy (Adult)  Goal: Signs and Symptoms of Listed Potential Problems Will be Absent, Minimized or Managed (Ileostomy)  Outcome: Ongoing (interventions implemented as appropriate)    Goal: Anesthesia/Sedation Recovery  Outcome: Outcome(s) achieved Date Met: 12/02/19

## 2019-12-02 NOTE — ANESTHESIA POSTPROCEDURE EVALUATION
Patient: Carole Weaver    Procedure Summary     Date:  12/02/19 Room / Location:  [facility] OR  / [facility] MAIN OR    Anesthesia Start:  0934 Anesthesia Stop:  1317    Procedure:  laparoscopic low anterior colon resection with mobilization of splenic flexure and diverting loop ileostomy: ERAS (N/A Abdomen) Diagnosis:       Rectal cancer (CMS/HCC)      (Rectal cancer (CMS/HCC) [C20])    Surgeon:  Padmaja Ye MD Provider:  Ravi Bryan MD    Anesthesia Type:  general with block ASA Status:  3          Anesthesia Type: general with block  Last vitals  BP   129/74 (12/02/19 0839)   Temp   36.4 °C (97.5 °F) (12/02/19 0839)   Pulse   71 (12/02/19 0901)   Resp   18 (12/02/19 0839)     SpO2   94 % (12/02/19 0901)     Post Anesthesia Care and Evaluation    Patient location during evaluation: PACU  Patient participation: complete - patient participated  Level of consciousness: awake and alert  Pain management: adequate  Airway patency: patent  Anesthetic complications: No anesthetic complications    Cardiovascular status: acceptable  Respiratory status: acceptable  Hydration status: acceptable    Comments: --------------------            12/02/19 0901     --------------------   BP:                  Pulse:      71       Resp:                Temp:                SpO2:      94%      --------------------

## 2019-12-02 NOTE — ANESTHESIA POSTPROCEDURE EVALUATION
Patient: Carole Weaver    Procedure Summary     Date:  12/02/19 Room / Location:  [facility] OR  / [facility] MAIN OR    Anesthesia Start:  0934 Anesthesia Stop:  1317    Procedure:  laparoscopic low anterior colon resection with mobilization of splenic flexure and diverting loop ileostomy: ERAS (N/A Abdomen) Diagnosis:       Rectal cancer (CMS/HCC)      (Rectal cancer (CMS/HCC) [C20])    Surgeon:  Padmaja Ye MD Provider:  Ravi Bryan MD    Anesthesia Type:  general with block ASA Status:  3          Anesthesia Type: general with block  Last vitals  BP   129/74 (12/02/19 0839)   Temp   36.4 °C (97.5 °F) (12/02/19 0839)   Pulse   71 (12/02/19 0901)   Resp   18 (12/02/19 0839)     SpO2   94 % (12/02/19 0901)     Post Anesthesia Care and Evaluation    Patient location during evaluation: PACU  Patient participation: complete - patient participated  Level of consciousness: awake and alert  Pain management: adequate  Airway patency: patent  Anesthetic complications: No anesthetic complications    Cardiovascular status: acceptable  Respiratory status: acceptable  Hydration status: acceptable    Comments: --------------------            12/02/19 0901     --------------------   BP:                  Pulse:      71       Resp:                Temp:                SpO2:      94%      --------------------

## 2019-12-02 NOTE — ANESTHESIA PROCEDURE NOTES
Airway  Urgency: elective    Date/Time: 12/2/2019 9:41 AM  Airway not difficult    General Information and Staff    Patient location during procedure: OR  Anesthesiologist: Ravi Bryan MD  CRNA: Delfina Whitney CRNA    Indications and Patient Condition  Indications for airway management: airway protection    Preoxygenated: yes  Mask difficulty assessment: 2 - vent by mask + OA or adjuvant +/- NMBA    Final Airway Details  Final airway type: endotracheal airway      Successful airway: ETT  Cuffed: yes   Successful intubation technique: direct laryngoscopy  Endotracheal tube insertion site: oral  Blade: Luis Antonio  Blade size: 3  ETT size (mm): 7.0  Cormack-Lehane Classification: grade I - full view of glottis  Placement verified by: chest auscultation and capnometry   Cuff volume (mL): 6  Measured from: teeth  ETT/EBT  to teeth (cm): 20  Number of attempts at approach: 1  Assessment: lips, teeth, and gum same as pre-op and atraumatic intubation

## 2019-12-02 NOTE — H&P
Carole Weaver is a 39 y.o. female who is finished with neoadjuvant chemo and radiation for rectal cancer.      HPI:  No abdominal pain or rectal pain  No rectal bleeding  Being treated for pulmonary embolism    Past Medical History:   Diagnosis Date   • Abnormal Pap smear of cervix 1998    JULIUS I   • Anemia in pregnancy    • Anxiety    • Cervical lymphadenitis 2012    SEEN AT Located within [facility] ER   • Chronic diarrhea    • Colon polyps     FOLLOWED BY DR. GERONIMO ESPARZA   • Depression    • Factor V Leiden (CMS/HCC)     DX 2019   • GERD (gastroesophageal reflux disease)    • Heart murmur     ?   • Hematochezia    • Hemorrhoids    • History of anemia    • History of chemotherapy     FOLLOWED BY DR. ALEXANDRU HUNT   • History of gestational diabetes    • History of pre-eclampsia    • History of radiation therapy     FOLLOWED BY DR. JAVON LEWIS   • History of TB skin testing 2009    TB Skin Test   • HPV in female    • IBS (irritable bowel syndrome)    • Infected insect bite of neck 2016    SEEN AT [facility]   • Irritable bowel syndrome    • Kidney stone     HX X2 , RECENT 10/19   • Kidney stones 2007    SEEN AT Located within [facility] ER   • Lumbar disc disease    • On anticoagulant therapy    • PIH (pregnancy induced hypertension)    • Pulmonary embolism (CMS/HCC) 2019    ADMITTED TO Located within [facility]   • Rectal cancer (CMS/HCC) 2019    INVASIVE MODERATELY DIFFERENTIATED ADENOCARCINOMA, CHEMO AND XRT FINISHED 2019   • Right leg pain    • Right ureteral stone 10/01/2019    SEEN AT Located within [facility] ER   • SAB (spontaneous ) 1996     A2-1 INDUCED   • Sepsis due to cellulitis (CMS/HCC) 2002    LEFT GREAT TOE, ADMITTED TO Located within [facility]       Past Surgical History:   Procedure Laterality Date   • CHOLECYSTECTOMY N/A 10/10/2003    LAPAROSCOPIC WITH CHOLANGIOGRAM, DR. JAMEY TALAVERA AT Located within [facility]   • COLONOSCOPY W/ POLYPECTOMY N/A 2019    15 MM TUBULOVILLOUS ADENOMA POLYP IN HEPATIC FLEXURE, 20 MMTUBULOVILLOUS  ADENOMA WITH HIGH GRADE DYSPLASIA IN RECTOSIGMOID, 4 CM MASS IN MID RECTUM, PATH: INVASIVE MODERATELY DIFFERENTIATED ADENOCARCINOMA, DR. JENNIFER LI AT [facility]   • DILATATION AND EVACUATION N/A 2009   • ENDOSCOPY N/A 07/08/2019    LA GRADE A ESOPHAGITIS, GASTRITIS, ALL BIOPSIES BENIGN, DR. JENNIFER LI AT [facility]   • PAP SMEAR N/A 01/24/2014   • SIGMOIDOSCOPY N/A 7/24/2019    INFILTRATIVE PARTIALLY OBSTRUCTING LARGE RECTAL CANCER, AREA TATOOED, DR. PADMAJA ESPARZA AT [facility]   • SIGMOIDOSCOPY N/A 11/23/2019    AN ULCERATED PARTIALLY OBSTRUCTING MASS IN MID RECTUM, AREA TATTOOED, DR. PADMAJA ESPARZA AT [facility]   • TRANSRECTAL ULTRASOUND N/A 7/24/2019    Procedure: ULTRASOUND TRANSRECTAL;  Surgeon: Padmaja Esparza MD;  Location: [facility] ENDOSCOPY;  Service: General   • URETEROSCOPY LASER LITHOTRIPSY WITH STENT INSERTION Right 10/2019   • VAGINAL DELIVERY N/A 09/18/1998   • VENOUS ACCESS DEVICE (PORT) INSERTION Left 8/9/2019    Procedure: INSERTION VENOUS ACCESS DEVICE;  Surgeon: Sj Joseph MD;  Location: [facility];  Service: General   • WISDOM TOOTH EXTRACTION  1993       Social History:   reports that she quit smoking about 2 months ago. Her smoking use included electronic cigarette and cigarettes. She has a 24.00 pack-year smoking history. She has never used smokeless tobacco. She reports that she drinks alcohol. She reports that she does not use drugs.      Marriage status:     Family History   Problem Relation Age of Onset   • Hyperlipidemia Mother    • Colon polyps Mother    • Heart disease Mother    • Hypertension Mother    • Factor V Leiden deficiency Mother    • Skin cancer Father         Squamous in 60s   • Heart disease Paternal Grandfather    • No Known Problems Son    • Cancer Paternal Uncle    • Colon cancer Paternal Uncle    • Diabetes Paternal Uncle    • Mental illness Sister         Addiction   • Colon cancer Maternal Uncle    • Malig Hyperthermia Neg Hx           Current Facility-Administered Medications:   •  acetaminophen (TYLENOL) tablet 1,000 mg, 1,000 mg, Oral, Q6H, Parul Rios PA-C, 1,000 mg at 12/02/19 0851  •  ceFAZolin in dextrose (ANCEF) IVPB solution 2 g, 2 g, Intravenous, Once **AND** metroNIDAZOLE (FLAGYL) 500 mg/100mL IVPB, 500 mg, Intravenous, Once, Parul Rios PA-C  •  famotidine (PEPCID) injection 20 mg, 20 mg, Intravenous, Once, Duy Salas MD  •  fentaNYL citrate (PF) (SUBLIMAZE) injection 50 mcg, 50 mcg, Intravenous, Q10 Min PRN, Duy Salas MD  •  lactated ringers infusion, 9 mL/hr, Intravenous, Continuous, Duy Salas MD, Last Rate: 9 mL/hr at 12/02/19 0851, 9 mL/hr at 12/02/19 0851  •  lidocaine PF 1% (XYLOCAINE) injection 0.5 mL, 0.5 mL, Injection, Once PRN, Duy Salas MD  •  midazolam (VERSED) injection 1 mg, 1 mg, Intravenous, Q5 Min PRN **OR** midazolam (VERSED) injection 2 mg, 2 mg, Intravenous, Q5 Min PRN, Duy Salas MD  •  sodium chloride 0.9 % flush 3 mL, 3 mL, Intravenous, Q12H, Duy Salas MD  •  sodium chloride 0.9 % flush 3-10 mL, 3-10 mL, Intravenous, PRN, Duy Salas MD    Allergy  Patient has no known allergies.    Review of Systems   Gastrointestinal: Negative for abdominal pain, nausea and vomiting.   All other systems reviewed and are negative.      Vitals:    12/02/19 0839   BP: 129/74   Pulse: 76   Resp: 18   Temp: 97.5 °F (36.4 °C)   SpO2: 95%     Body mass index is 36.36 kg/m².    Physical Exam   Constitutional: She is oriented to person, place, and time. She appears well-developed and well-nourished.   HENT:   Head: Normocephalic and atraumatic.   Eyes: EOM are normal. Pupils are equal, round, and reactive to light.   Cardiovascular: Regular rhythm.   Pulmonary/Chest: Effort normal.   Abdominal: Soft. She exhibits no distension.   Musculoskeletal: Normal range of motion.   Neurological: She is alert and oriented to person, place, and  time.   Skin: Skin is warm and dry.   Psychiatric: Judgment and thought content normal.       Assessment:  1. Rectal cancer (CMS/HCC)        Plan:  I recommend laparoscopic low anterior resection with diverting loop ileostomy.  I described with patient typical surgical time, postop recovery including pain management, and restrictions. I discussed with patient risks, benefits, and alternatives.  The patient had opportunity to ask questions.  I answered all questions.  Patient understands and wishes to proceed with procedure.

## 2019-12-02 NOTE — BRIEF OP NOTE
COLON RESECTION LAPAROSCOPIC SIGMOID OR LOW ANTERIOR  Progress Note    Carole Weaver  12/2/2019    Pre-op Diagnosis:   Rectal cancer (CMS/HCC) [C20]       Post-Op Diagnosis Codes:     * Rectal cancer (CMS/HCC) [C20]    Procedure/CPT® Codes:      Procedure(s):  laparoscopic low anterior colon resection with mobilization of splenic flexure and diverting loop ileostomy: AFSANEH    Surgeon(s):  Padmaja Ye MD    Anesthesia: General with Block    Staff:   Circulator: Jeff Stephen RN; Floresita Olsen RN  Scrub Person: Sakina Monte RN; Helen Celis  Assistant: Parul Rios PA-C    Estimated Blood Loss: 150 mL    Urine Voided: 160 mL    Specimens:                Specimens     ID Source Type Tests Collected By Collected At Frozen?      A Large Intestine, Sigmoid Colon Tissue · TISSUE PATHOLOGY EXAM   Padmaja Ye MD 12/2/19 1156      Description: SIGMOID AND RECTUM    B Large Intestine, Sigmoid Colon Tissue · TISSUE PATHOLOGY EXAM   Padmaja Ye MD 12/2/19 1207 No     Description: proximal anastamotic ring    C Large Intestine, Sigmoid Colon Tissue · TISSUE PATHOLOGY EXAM   Padmaja Ye MD 12/2/19 1207 No     Description: distal anastamotic ring                Drains:   Closed/Suction Drain Abdomen Bulb 19 Fr. (Active)   Dressing Status Clean;Dry;Intact 12/2/2019  1:20 PM   Drainage Appearance Serosanguineous 12/2/2019  1:20 PM   Status To bulb suction 12/2/2019  1:20 PM       Ileostomy Loop RLQ (Active)   Stomal Appliance 2 piece;Clean;Dry;Intact 12/2/2019  1:20 PM   Stoma Appearance round;red 12/2/2019  1:20 PM       Urethral Catheter Non-latex 16 Fr. (Active)   Daily Indications Selected surgeries ( tract, abdomen) 12/2/2019  1:20 PM   Collection Container Standard drainage bag 12/2/2019  1:20 PM   Securement Method Securing device 12/2/2019  1:20 PM       Findings:     Complications:      Padmaja Ye MD     Date: 12/2/2019  Time: 2:16 PM

## 2019-12-02 NOTE — ANESTHESIA PREPROCEDURE EVALUATION
Anesthesia Evaluation     Patient summary reviewed and Nursing notes reviewed   NPO Solid Status: > 8 hours  NPO Liquid Status: > 2 hours           Airway   Mallampati: II  TM distance: >3 FB  Neck ROM: full  Dental - normal exam     Pulmonary - normal exam   (+) pulmonary embolism,   Cardiovascular - normal exam    ECG reviewed    (+) hypertension, valvular problems/murmurs,       Neuro/Psych  (+) psychiatric history Anxiety and Depression,     GI/Hepatic/Renal/Endo    (+) obesity,  GERD,  renal disease stones,     Musculoskeletal (-) negative ROS    Abdominal    Substance History - negative use     OB/GYN negative ob/gyn ROS         Other      history of cancer                    Anesthesia Plan    ASA 3     general with block       Anesthetic plan, all risks, benefits, and alternatives have been provided, discussed and informed consent has been obtained with: patient.

## 2019-12-03 LAB
ANION GAP SERPL CALCULATED.3IONS-SCNC: 12.8 MMOL/L (ref 5–15)
APTT PPP: 29.3 SECONDS (ref 22.7–35.4)
APTT PPP: 31.8 SECONDS (ref 22.7–35.4)
BASOPHILS # BLD AUTO: 0.01 10*3/MM3 (ref 0–0.2)
BASOPHILS NFR BLD AUTO: 0.1 % (ref 0–1.5)
BUN BLD-MCNC: 8 MG/DL (ref 6–20)
BUN/CREAT SERPL: 16.3 (ref 7–25)
CALCIUM SPEC-SCNC: 7.6 MG/DL (ref 8.6–10.5)
CHLORIDE SERPL-SCNC: 101 MMOL/L (ref 98–107)
CO2 SERPL-SCNC: 21.2 MMOL/L (ref 22–29)
CREAT BLD-MCNC: 0.49 MG/DL (ref 0.57–1)
DEPRECATED RDW RBC AUTO: 41.4 FL (ref 37–54)
EOSINOPHIL # BLD AUTO: 0 10*3/MM3 (ref 0–0.4)
EOSINOPHIL NFR BLD AUTO: 0 % (ref 0.3–6.2)
ERYTHROCYTE [DISTWIDTH] IN BLOOD BY AUTOMATED COUNT: 12.6 % (ref 12.3–15.4)
GFR SERPL CREATININE-BSD FRML MDRD: 140 ML/MIN/1.73
GLUCOSE BLD-MCNC: 158 MG/DL (ref 65–99)
HCT VFR BLD AUTO: 33 % (ref 34–46.6)
HGB BLD-MCNC: 11.3 G/DL (ref 12–15.9)
IMM GRANULOCYTES # BLD AUTO: 0.04 10*3/MM3 (ref 0–0.05)
IMM GRANULOCYTES NFR BLD AUTO: 0.4 % (ref 0–0.5)
INR PPP: 1.23 (ref 0.9–1.1)
INR PPP: 1.34 (ref 0.9–1.1)
LYMPHOCYTES # BLD AUTO: 0.58 10*3/MM3 (ref 0.7–3.1)
LYMPHOCYTES NFR BLD AUTO: 5.6 % (ref 19.6–45.3)
MAGNESIUM SERPL-MCNC: 2.3 MG/DL (ref 1.6–2.6)
MCH RBC QN AUTO: 30.5 PG (ref 26.6–33)
MCHC RBC AUTO-ENTMCNC: 34.2 G/DL (ref 31.5–35.7)
MCV RBC AUTO: 88.9 FL (ref 79–97)
MONOCYTES # BLD AUTO: 0.68 10*3/MM3 (ref 0.1–0.9)
MONOCYTES NFR BLD AUTO: 6.5 % (ref 5–12)
NEUTROPHILS # BLD AUTO: 9.08 10*3/MM3 (ref 1.7–7)
NEUTROPHILS NFR BLD AUTO: 87.4 % (ref 42.7–76)
NRBC BLD AUTO-RTO: 0 /100 WBC (ref 0–0.2)
PLATELET # BLD AUTO: 301 10*3/MM3 (ref 140–450)
PMV BLD AUTO: 8.7 FL (ref 6–12)
POTASSIUM BLD-SCNC: 4.2 MMOL/L (ref 3.5–5.2)
PROTHROMBIN TIME: 15.2 SECONDS (ref 11.7–14.2)
PROTHROMBIN TIME: 16.3 SECONDS (ref 11.7–14.2)
RBC # BLD AUTO: 3.71 10*6/MM3 (ref 3.77–5.28)
SODIUM BLD-SCNC: 135 MMOL/L (ref 136–145)
WBC NRBC COR # BLD: 10.39 10*3/MM3 (ref 3.4–10.8)

## 2019-12-03 PROCEDURE — 25010000002 ONDANSETRON PER 1 MG: Performed by: COLON & RECTAL SURGERY

## 2019-12-03 PROCEDURE — 25010000002 HEPARIN (PORCINE) PER 1000 UNITS: Performed by: COLON & RECTAL SURGERY

## 2019-12-03 PROCEDURE — 85610 PROTHROMBIN TIME: CPT | Performed by: COLON & RECTAL SURGERY

## 2019-12-03 PROCEDURE — 85025 COMPLETE CBC W/AUTO DIFF WBC: CPT | Performed by: COLON & RECTAL SURGERY

## 2019-12-03 PROCEDURE — 25010000002 DIAZEPAM PER 5 MG: Performed by: PHYSICIAN ASSISTANT

## 2019-12-03 PROCEDURE — 25010000002 METHOCARBAMOL 1000 MG/10ML SOLUTION 10 ML VIAL: Performed by: PHYSICIAN ASSISTANT

## 2019-12-03 PROCEDURE — 85730 THROMBOPLASTIN TIME PARTIAL: CPT | Performed by: COLON & RECTAL SURGERY

## 2019-12-03 PROCEDURE — 25010000002 HYDROMORPHONE PER 4 MG: Performed by: COLON & RECTAL SURGERY

## 2019-12-03 PROCEDURE — 80048 BASIC METABOLIC PNL TOTAL CA: CPT | Performed by: COLON & RECTAL SURGERY

## 2019-12-03 PROCEDURE — 25010000003 AMPICILLIN-SULBACTAM PER 1.5 G: Performed by: COLON & RECTAL SURGERY

## 2019-12-03 PROCEDURE — 83735 ASSAY OF MAGNESIUM: CPT | Performed by: COLON & RECTAL SURGERY

## 2019-12-03 RX ORDER — DIAZEPAM 5 MG/ML
2.5 INJECTION, SOLUTION INTRAMUSCULAR; INTRAVENOUS ONCE
Status: COMPLETED | OUTPATIENT
Start: 2019-12-03 | End: 2019-12-03

## 2019-12-03 RX ORDER — DIAZEPAM 2 MG/1
4 TABLET ORAL EVERY 6 HOURS PRN
Status: DISCONTINUED | OUTPATIENT
Start: 2019-12-03 | End: 2019-12-07 | Stop reason: HOSPADM

## 2019-12-03 RX ORDER — HEPARIN SODIUM 10000 [USP'U]/100ML
14.7 INJECTION, SOLUTION INTRAVENOUS
Status: DISCONTINUED | OUTPATIENT
Start: 2019-12-03 | End: 2019-12-04

## 2019-12-03 RX ADMIN — AMPICILLIN SODIUM AND SULBACTAM SODIUM 3 G: 2; 1 INJECTION, POWDER, FOR SOLUTION INTRAMUSCULAR; INTRAVENOUS at 14:07

## 2019-12-03 RX ADMIN — ONDANSETRON 4 MG: 2 INJECTION INTRAMUSCULAR; INTRAVENOUS at 14:03

## 2019-12-03 RX ADMIN — CELECOXIB 200 MG: 200 CAPSULE ORAL at 09:34

## 2019-12-03 RX ADMIN — DIAZEPAM 4 MG: 2 TABLET ORAL at 18:16

## 2019-12-03 RX ADMIN — AMPICILLIN SODIUM AND SULBACTAM SODIUM 3 G: 2; 1 INJECTION, POWDER, FOR SOLUTION INTRAMUSCULAR; INTRAVENOUS at 06:11

## 2019-12-03 RX ADMIN — DIAZEPAM 2.5 MG: 10 INJECTION, SOLUTION INTRAMUSCULAR; INTRAVENOUS at 09:34

## 2019-12-03 RX ADMIN — HYDROMORPHONE HYDROCHLORIDE 0.25 MG: 10 INJECTION INTRAMUSCULAR; INTRAVENOUS; SUBCUTANEOUS at 07:36

## 2019-12-03 RX ADMIN — HYDROMORPHONE HYDROCHLORIDE 0.25 MG: 10 INJECTION INTRAMUSCULAR; INTRAVENOUS; SUBCUTANEOUS at 11:03

## 2019-12-03 RX ADMIN — ACETAMINOPHEN 1000 MG: 500 TABLET, FILM COATED ORAL at 20:02

## 2019-12-03 RX ADMIN — METHOCARBAMOL 1000 MG: 100 INJECTION, SOLUTION INTRAMUSCULAR; INTRAVENOUS at 09:34

## 2019-12-03 RX ADMIN — CELECOXIB 200 MG: 200 CAPSULE ORAL at 20:03

## 2019-12-03 RX ADMIN — GABAPENTIN 600 MG: 300 CAPSULE ORAL at 09:34

## 2019-12-03 RX ADMIN — AMPICILLIN SODIUM AND SULBACTAM SODIUM 3 G: 2; 1 INJECTION, POWDER, FOR SOLUTION INTRAMUSCULAR; INTRAVENOUS at 22:55

## 2019-12-03 RX ADMIN — HYDROMORPHONE HYDROCHLORIDE 0.25 MG: 10 INJECTION INTRAMUSCULAR; INTRAVENOUS; SUBCUTANEOUS at 04:03

## 2019-12-03 RX ADMIN — HYDROMORPHONE HYDROCHLORIDE 0.25 MG: 10 INJECTION INTRAMUSCULAR; INTRAVENOUS; SUBCUTANEOUS at 18:21

## 2019-12-03 RX ADMIN — ONDANSETRON 4 MG: 2 INJECTION INTRAMUSCULAR; INTRAVENOUS at 07:36

## 2019-12-03 RX ADMIN — SODIUM CHLORIDE, POTASSIUM CHLORIDE, SODIUM LACTATE AND CALCIUM CHLORIDE 50 ML/HR: 600; 310; 30; 20 INJECTION, SOLUTION INTRAVENOUS at 04:48

## 2019-12-03 RX ADMIN — ACETAMINOPHEN 1000 MG: 500 TABLET, FILM COATED ORAL at 02:19

## 2019-12-03 RX ADMIN — ACETAMINOPHEN 1000 MG: 500 TABLET, FILM COATED ORAL at 09:33

## 2019-12-03 RX ADMIN — METOPROLOL TARTRATE 5 MG: 5 INJECTION, SOLUTION INTRAVENOUS at 16:22

## 2019-12-03 RX ADMIN — HYDROMORPHONE HYDROCHLORIDE 0.25 MG: 10 INJECTION INTRAMUSCULAR; INTRAVENOUS; SUBCUTANEOUS at 14:03

## 2019-12-03 RX ADMIN — FAMOTIDINE 20 MG: 10 INJECTION INTRAVENOUS at 20:02

## 2019-12-03 RX ADMIN — ONDANSETRON 4 MG: 2 INJECTION INTRAMUSCULAR; INTRAVENOUS at 04:07

## 2019-12-03 RX ADMIN — METOPROLOL TARTRATE 5 MG: 5 INJECTION, SOLUTION INTRAVENOUS at 20:03

## 2019-12-03 RX ADMIN — GABAPENTIN 600 MG: 300 CAPSULE ORAL at 20:03

## 2019-12-03 RX ADMIN — ENALAPRILAT 0.62 MG: 2.5 INJECTION INTRAVENOUS at 02:31

## 2019-12-03 RX ADMIN — ACETAMINOPHEN 1000 MG: 500 TABLET, FILM COATED ORAL at 14:03

## 2019-12-03 RX ADMIN — FAMOTIDINE 20 MG: 10 INJECTION INTRAVENOUS at 09:34

## 2019-12-03 RX ADMIN — HYDROMORPHONE HYDROCHLORIDE 0.25 MG: 10 INJECTION INTRAMUSCULAR; INTRAVENOUS; SUBCUTANEOUS at 22:55

## 2019-12-03 RX ADMIN — ESCITALOPRAM OXALATE 20 MG: 20 TABLET, FILM COATED ORAL at 09:34

## 2019-12-03 RX ADMIN — HEPARIN SODIUM 14.7 UNITS/KG/HR: 10000 INJECTION, SOLUTION INTRAVENOUS at 17:48

## 2019-12-03 RX ADMIN — SODIUM CHLORIDE 500 ML: 9 INJECTION, SOLUTION INTRAVENOUS at 02:44

## 2019-12-03 RX ADMIN — METOPROLOL TARTRATE 2.5 MG: 5 INJECTION, SOLUTION INTRAVENOUS at 02:53

## 2019-12-03 RX ADMIN — METHOCARBAMOL 1000 MG: 100 INJECTION, SOLUTION INTRAMUSCULAR; INTRAVENOUS at 21:54

## 2019-12-03 RX ADMIN — METOPROLOL TARTRATE 2.5 MG: 5 INJECTION, SOLUTION INTRAVENOUS at 05:07

## 2019-12-03 NOTE — PLAN OF CARE
Problem: Patient Care Overview  Goal: Plan of Care Review  Outcome: Ongoing (interventions implemented as appropriate)   12/03/19 7993   Coping/Psychosocial   Plan of Care Reviewed With patient   Plan of Care Review   Progress no change   OTHER   Outcome Summary IV dilaudid requested just about q 3 hrs. PRN Robaxin and Valium added. Pt walked x2. Large amount of MAGAN output. +stool in ostomy and adequate urine output. VSS, will continue to monitor.

## 2019-12-03 NOTE — DISCHARGE PLACEMENT REQUEST
"Carole Weaver (39 y.o. Female)     Date of Birth Social Security Number Address Home Phone MRN    1980  [id number] [place] [id number] [phone] [id number]    Mormonism Marital Status          Religion        Admission Date Admission Type Admitting Provider Attending Provider Department, Room/Bed    12/2/19 Elective Padmaja Ye MD Allen, Nechol L, MD [address], [address]1    Discharge Date Discharge Disposition Discharge Destination                       Attending Provider:  Padmaja Ye MD    Allergies:  No Known Allergies    Isolation:  None   Infection:  None   Code Status:  CPR    Ht:  167.6 cm (66\")   Wt:  102 kg (225 lb 4 oz)    Admission Cmt:  None   Principal Problem:  Rectal cancer (CMS/HCC) [C20] More...                 Active Insurance as of 12/2/2019     Primary Coverage     Payor Plan Insurance Group Employer/Plan Group    [employer]HE[employer] BLUE CROSS AN[employer]M [employer] CROSS BLUE SHIELD PPO [id number]     Payor Plan Address Payor Plan Phone Number Payor Plan Fax Number Effective Dates    [address] [phone]  1/1/2018 - None Entered    [facility] [id number]       Subscriber Name Subscriber Birth Date Member ID       CAROLE WEAVER 1980 [id number]                 Emergency Contacts      (Rel.) Home Phone Work Phone Mobile Phone    OwenJustus (Spouse) [phone] -- --    DENIS CRONIN (Mother) -- -- [phone]            "

## 2019-12-03 NOTE — PROGRESS NOTES
Progress Note    POD1 s/p laparoscopic low anterior colon resection with mobilization of splenic flexure and diverting loop ileostomy    S: positive flatus,  positive BM. positive ambulating:. Pt c/o pain with ERAS protocol scheduled Tylenol, Celebrex, and gabapentin, IV robaxin, as well as IV dilaudid for breakthrough pain.    She c/o nausea with clears.  No vomiting    O:  Temp:  [97.6 °F (36.4 °C)-98.8 °F (37.1 °C)] 98.8 °F (37.1 °C)  Heart Rate:  [] 115  Resp:  [14-18] 16  BP: (137-178)/() 148/89      Intake/Output Summary (Last 24 hours) at 12/3/2019 1342  Last data filed at 12/3/2019 1325  Gross per 24 hour   Intake 500 ml   Output 2235 ml   Net -1735 ml       Abd: soft, appropriately tender, non-distended  Incision: clean, dry, intact  Stoma: pink      Results from last 7 days   Lab Units 12/03/19  0355   WBC 10*3/mm3 10.39   HEMOGLOBIN g/dL 11.3*   HEMATOCRIT % 33.0*   PLATELETS 10*3/mm3 301     Results from last 7 days   Lab Units 12/03/19  0355   SODIUM mmol/L 135*   POTASSIUM mmol/L 4.2   CHLORIDE mmol/L 101   CO2 mmol/L 21.2*   BUN mg/dL 8   CREATININE mg/dL 0.49*   GLUCOSE mg/dL 158*   CALCIUM mg/dL 7.6*     Results from last 7 days   Lab Units 12/03/19  0355   MAGNESIUM mg/dL 2.3       A/P: 39 y.o. female POD1 s/p laparoscopic low anterior colon resection with mobilization of splenic flexure and diverting loop ileostomy    -afebrile.  Mild tachy and HTN; add scheduled metoprolol  -change lovenox to heparin drip for hx PE while on lovenox, heterozygous Factor V Leiden  -continue clears while she is nauseated  -continue ERAS pain meds  -add prn valium  -discussed importance of increasing ambulation     Scribed for Padmaja Ye MD by Parul Rios PA-C. 12/3/2019  1:42 PM   This patient was evaluated by me, recommendations made, documentation reviewed, edited, and revised by me, Padmaja Ye MD

## 2019-12-03 NOTE — PROGRESS NOTES
Discharge Planning Assessment  [facility]     Patient Name: Carole Weaevr  MRN: [id number]  Today's Date: 12/3/2019    Admit Date: 12/2/2019    Discharge Needs Assessment     Row Name 12/03/19 5848       Living Environment    Lives With  child(zandra), dependent;spouse    Name(s) of Who Lives With Patient  , Justus Weaver, [phone], and dependent child    Current Living Arrangements  home/apartment/condo    Primary Care Provided by  self    Provides Primary Care For  child(zandra)    Family Caregiver if Needed  spouse    Quality of Family Relationships  helpful;involved;supportive    Able to Return to Prior Arrangements  yes       Resource/Environmental Concerns    Resource/Environmental Concerns  none       Transition Planning    Patient/Family Anticipates Transition to  home with family;home with help/services    Patient/Family Anticipated Services at Transition  home health care    Transportation Anticipated  family or friend will provide       Discharge Needs Assessment    Concerns to be Addressed  discharge planning    Equipment Currently Used at Home  none    Equipment Needed After Discharge  wound care supplies;other (see comments) new ostomy    Outpatient/Agency/Support Group Needs  homecare agency    Discharge Facility/Level of Care Needs  home with home health    Provided post acute provider list?  Yes    Post Acute Provider Lists  Home Health    Post Acuite Provider List  Delivered    Delivered To  Patient    Method of Delivery  In person    Patient's Choice of Community Agency(s)  [facility]    Discharge Coordination/Progress  [facility]        Discharge Plan     Row Name 12/03/19 4730       Plan    Plan  Home with family, [facility] for new ostomy    Provided post acute provider list?  Yes    Post Acute Provider Lists  Home Health    Post Acuite Provider List  Delivered    Delivered To  Patient    Method of Delivery  In person    Patient/Family in Agreement with Plan  yes    Plan Comments   CCP met with pt to discuss d/c planning. Facesheet verified. CCP role explained. Pt resides with her  and child in a single level home, and denies DME use, past home health, and past sub-acute rehab. Pt confirms pharmacy is [facility] on [address]. Pt agreeable to home health referral for new ostomy care, and selects [facility] from list provided (referral placed in EPIC). CCP to follow to assist should additional d/c needs arise. Colleen Adler LCSW         Destination      No service coordination in this encounter.      Durable Medical Equipment      No service coordination in this encounter.      Dialysis/Infusion      No service coordination in this encounter.      Home Medical Care      No service coordination in this encounter.      Therapy      No service coordination in this encounter.      Community Resources      No service coordination in this encounter.          Demographic Summary     Row Name 12/03/19 9826       General Information    Admission Type  inpatient    Arrived From  home    Referral Source  admission list    Reason for Consult  discharge planning    Preferred Language  English        Functional Status     Row Name 12/03/19 1358       Functional Status    Usual Activity Tolerance  good    Current Activity Tolerance  good       Functional Status, IADL    Medications  independent    Meal Preparation  independent    Housekeeping  independent    Laundry  independent    Shopping  independent       Mental Status Summary    Recent Changes in Mental Status/Cognitive Functioning  no changes        Psychosocial    No documentation.       Abuse/Neglect    No documentation.       Legal    No documentation.       Substance Abuse    No documentation.       Patient Forms    No documentation.           Ami Adler LCSW

## 2019-12-03 NOTE — PROGRESS NOTES
Progress Note    POD1 s/p laparoscopic low anterior colon resection with mobilization of splenic flexure and diverting loop ileostomy    S: positive flatus,  positive BM. No ambulating. Pt c/o pain with ERAS protocol scheduled Tylenol, Celebrex, and gabapentin, as well as IV dilaudid x3 overnight for breakthrough pain    Pt c/o nausea, no vomiting    O:  Temp:  [97.5 °F (36.4 °C)-98.2 °F (36.8 °C)] 97.8 °F (36.6 °C)  Heart Rate:  [] 108  Resp:  [14-18] 18  BP: (117-178)/() 150/94      Intake/Output Summary (Last 24 hours) at 12/3/2019 0659  Last data filed at 12/3/2019 0513  Gross per 24 hour   Intake 2256 ml   Output 2030 ml   Net 226 ml   Manzo: 1460cc clear yellow urine  MAGAN: 370cc serosang  Ileo: 50cc + x2 unmeasured dark brown liquid output    Abd: soft, appropriately tender, non-distended  Incision: clean, dry, intact  Stoma: pink      Results from last 7 days   Lab Units 12/03/19  0355   WBC 10*3/mm3 10.39   HEMOGLOBIN g/dL 11.3*   HEMATOCRIT % 33.0*   PLATELETS 10*3/mm3 301     Results from last 7 days   Lab Units 12/03/19  0355   SODIUM mmol/L 135*   POTASSIUM mmol/L 4.2   CHLORIDE mmol/L 101   CO2 mmol/L 21.2*   BUN mg/dL 8   CREATININE mg/dL 0.49*   GLUCOSE mg/dL 158*   CALCIUM mg/dL 7.6*     Results from last 7 days   Lab Units 12/03/19  0355   MAGNESIUM mg/dL 2.3         A/P: 39 y.o. female POD1 s/p laparoscopic low anterior colon resection with mobilization of splenic flexure and diverting loop ileostomy    -afebrile.  tachycardic to 120s overnight; HR improved with metoprolol  -add robaxin for pain.  Discussion with pt unfortunately cannot make postop pain 0; discussed ERAS protocol  -discussed importance of increasing ambulation  -keep manzo     Parul Rios PA-C  6:59 AM

## 2019-12-03 NOTE — NURSING NOTE
CWOCN consult for ostomy teaching. Patient with new loop ileostomy. Stoma is moist, pink with bridge in place. Patient is resting and has been having pain, nausea. I left supplies in room and plan to change pouch tomorrow. Patient and family in room have no questions. Will follow along for teaching.

## 2019-12-03 NOTE — PLAN OF CARE
Problem: Patient Care Overview  Goal: Plan of Care Review  Outcome: Ongoing (interventions implemented as appropriate)   12/03/19 0159   Coping/Psychosocial   Plan of Care Reviewed With patient   Plan of Care Review   Progress no change   OTHER   Outcome Summary Pt complains of intense pain; Zofran x 2; IV Dilaudid x 3; Explained ERAS - but teary eyed and increase of BP poss. r/t pain; Braswell output: 600 mL & MAGAN output: 180 mL this shift; Pt up to bedside commode x 2; Encouraged pt to walk / move around in the bed; pt does not think she can tolerate walking for any disance so far; slight brown output in ostomy; approx 0200 HR increased to 120s; MD Notified and orders given; 0400:  /94   - HR increased again to 120s - 2nd dose Metoprolol given - HR back to 107 at 0547       Problem: Pain, Acute (Adult)  Goal: Acceptable Pain Control/Comfort Level  Outcome: Ongoing (interventions implemented as appropriate)

## 2019-12-04 ENCOUNTER — TELEPHONE (OUTPATIENT)
Dept: SURGERY | Facility: CLINIC | Age: 39
End: 2019-12-04

## 2019-12-04 LAB
ALBUMIN SERPL-MCNC: 2.4 G/DL (ref 3.5–5.2)
ANION GAP SERPL CALCULATED.3IONS-SCNC: 9.7 MMOL/L (ref 5–15)
APTT PPP: 104.2 SECONDS (ref 22.7–35.4)
APTT PPP: 127.1 SECONDS (ref 22.7–35.4)
BASOPHILS # BLD AUTO: 0.02 10*3/MM3 (ref 0–0.2)
BASOPHILS NFR BLD AUTO: 0.2 % (ref 0–1.5)
BUN BLD-MCNC: 11 MG/DL (ref 6–20)
BUN/CREAT SERPL: 19 (ref 7–25)
CALCIUM SPEC-SCNC: 7.9 MG/DL (ref 8.6–10.5)
CHLORIDE SERPL-SCNC: 106 MMOL/L (ref 98–107)
CO2 SERPL-SCNC: 24.3 MMOL/L (ref 22–29)
CREAT BLD-MCNC: 0.58 MG/DL (ref 0.57–1)
DEPRECATED RDW RBC AUTO: 41.6 FL (ref 37–54)
EOSINOPHIL # BLD AUTO: 0.01 10*3/MM3 (ref 0–0.4)
EOSINOPHIL NFR BLD AUTO: 0.1 % (ref 0.3–6.2)
ERYTHROCYTE [DISTWIDTH] IN BLOOD BY AUTOMATED COUNT: 12.8 % (ref 12.3–15.4)
GFR SERPL CREATININE-BSD FRML MDRD: 115 ML/MIN/1.73
GLUCOSE BLD-MCNC: 107 MG/DL (ref 65–99)
HCT VFR BLD AUTO: 22.7 % (ref 34–46.6)
HCT VFR BLD AUTO: 22.8 % (ref 34–46.6)
HGB BLD-MCNC: 7.5 G/DL (ref 12–15.9)
HGB BLD-MCNC: 7.7 G/DL (ref 12–15.9)
IMM GRANULOCYTES # BLD AUTO: 0.04 10*3/MM3 (ref 0–0.05)
IMM GRANULOCYTES NFR BLD AUTO: 0.4 % (ref 0–0.5)
LYMPHOCYTES # BLD AUTO: 0.98 10*3/MM3 (ref 0.7–3.1)
LYMPHOCYTES NFR BLD AUTO: 10.7 % (ref 19.6–45.3)
MCH RBC QN AUTO: 31 PG (ref 26.6–33)
MCHC RBC AUTO-ENTMCNC: 33.9 G/DL (ref 31.5–35.7)
MCV RBC AUTO: 91.5 FL (ref 79–97)
MONOCYTES # BLD AUTO: 0.73 10*3/MM3 (ref 0.1–0.9)
MONOCYTES NFR BLD AUTO: 7.9 % (ref 5–12)
NEUTROPHILS # BLD AUTO: 7.42 10*3/MM3 (ref 1.7–7)
NEUTROPHILS NFR BLD AUTO: 80.7 % (ref 42.7–76)
NRBC BLD AUTO-RTO: 0 /100 WBC (ref 0–0.2)
PHOSPHATE SERPL-MCNC: 1.8 MG/DL (ref 2.5–4.5)
PLATELET # BLD AUTO: 216 10*3/MM3 (ref 140–450)
PMV BLD AUTO: 9 FL (ref 6–12)
POTASSIUM BLD-SCNC: 4.1 MMOL/L (ref 3.5–5.2)
RBC # BLD AUTO: 2.48 10*6/MM3 (ref 3.77–5.28)
SODIUM BLD-SCNC: 140 MMOL/L (ref 136–145)
WBC NRBC COR # BLD: 9.2 10*3/MM3 (ref 3.4–10.8)

## 2019-12-04 PROCEDURE — 36430 TRANSFUSION BLD/BLD COMPNT: CPT

## 2019-12-04 PROCEDURE — 86900 BLOOD TYPING SEROLOGIC ABO: CPT

## 2019-12-04 PROCEDURE — 86901 BLOOD TYPING SEROLOGIC RH(D): CPT

## 2019-12-04 PROCEDURE — P9016 RBC LEUKOCYTES REDUCED: HCPCS

## 2019-12-04 PROCEDURE — 85014 HEMATOCRIT: CPT | Performed by: PHYSICIAN ASSISTANT

## 2019-12-04 PROCEDURE — 85025 COMPLETE CBC W/AUTO DIFF WBC: CPT | Performed by: PHYSICIAN ASSISTANT

## 2019-12-04 PROCEDURE — 25010000002 HYDROMORPHONE PER 4 MG: Performed by: COLON & RECTAL SURGERY

## 2019-12-04 PROCEDURE — 85018 HEMOGLOBIN: CPT | Performed by: PHYSICIAN ASSISTANT

## 2019-12-04 PROCEDURE — 25010000003 AMPICILLIN-SULBACTAM PER 1.5 G: Performed by: COLON & RECTAL SURGERY

## 2019-12-04 PROCEDURE — 85730 THROMBOPLASTIN TIME PARTIAL: CPT | Performed by: COLON & RECTAL SURGERY

## 2019-12-04 PROCEDURE — 80069 RENAL FUNCTION PANEL: CPT | Performed by: PHYSICIAN ASSISTANT

## 2019-12-04 RX ORDER — GABAPENTIN 300 MG/1
300 CAPSULE ORAL 2 TIMES DAILY
Status: COMPLETED | OUTPATIENT
Start: 2019-12-04 | End: 2019-12-05

## 2019-12-04 RX ORDER — DEXTROSE, SODIUM CHLORIDE, AND POTASSIUM CHLORIDE 5; .45; .15 G/100ML; G/100ML; G/100ML
50 INJECTION INTRAVENOUS CONTINUOUS
Status: DISCONTINUED | OUTPATIENT
Start: 2019-12-04 | End: 2019-12-07

## 2019-12-04 RX ADMIN — METOPROLOL TARTRATE 5 MG: 5 INJECTION, SOLUTION INTRAVENOUS at 20:43

## 2019-12-04 RX ADMIN — CELECOXIB 200 MG: 200 CAPSULE ORAL at 20:43

## 2019-12-04 RX ADMIN — FAMOTIDINE 20 MG: 10 INJECTION INTRAVENOUS at 08:36

## 2019-12-04 RX ADMIN — DIAZEPAM 4 MG: 2 TABLET ORAL at 22:21

## 2019-12-04 RX ADMIN — HYDROMORPHONE HYDROCHLORIDE 0.25 MG: 10 INJECTION INTRAMUSCULAR; INTRAVENOUS; SUBCUTANEOUS at 21:34

## 2019-12-04 RX ADMIN — METOPROLOL TARTRATE 5 MG: 5 INJECTION, SOLUTION INTRAVENOUS at 08:36

## 2019-12-04 RX ADMIN — DIBASIC SODIUM PHOSPHATE, MONOBASIC POTASSIUM PHOSPHATE AND MONOBASIC SODIUM PHOSPHATE 2 TABLET: 852; 155; 130 TABLET ORAL at 20:43

## 2019-12-04 RX ADMIN — FAMOTIDINE 20 MG: 10 INJECTION INTRAVENOUS at 20:43

## 2019-12-04 RX ADMIN — POTASSIUM CHLORIDE, DEXTROSE MONOHYDRATE AND SODIUM CHLORIDE 75 ML/HR: 150; 5; 450 INJECTION, SOLUTION INTRAVENOUS at 09:41

## 2019-12-04 RX ADMIN — DIBASIC SODIUM PHOSPHATE, MONOBASIC POTASSIUM PHOSPHATE AND MONOBASIC SODIUM PHOSPHATE 2 TABLET: 852; 155; 130 TABLET ORAL at 08:36

## 2019-12-04 RX ADMIN — HYDROMORPHONE HYDROCHLORIDE 0.25 MG: 10 INJECTION INTRAMUSCULAR; INTRAVENOUS; SUBCUTANEOUS at 16:11

## 2019-12-04 RX ADMIN — CELECOXIB 200 MG: 200 CAPSULE ORAL at 08:36

## 2019-12-04 RX ADMIN — DIAZEPAM 4 MG: 2 TABLET ORAL at 01:42

## 2019-12-04 RX ADMIN — SODIUM CHLORIDE, POTASSIUM CHLORIDE, SODIUM LACTATE AND CALCIUM CHLORIDE 50 ML/HR: 600; 310; 30; 20 INJECTION, SOLUTION INTRAVENOUS at 01:40

## 2019-12-04 RX ADMIN — GABAPENTIN 300 MG: 300 CAPSULE ORAL at 20:44

## 2019-12-04 RX ADMIN — METOPROLOL TARTRATE 5 MG: 5 INJECTION, SOLUTION INTRAVENOUS at 15:21

## 2019-12-04 RX ADMIN — ACETAMINOPHEN 1000 MG: 500 TABLET, FILM COATED ORAL at 19:33

## 2019-12-04 RX ADMIN — AMPICILLIN SODIUM AND SULBACTAM SODIUM 3 G: 2; 1 INJECTION, POWDER, FOR SOLUTION INTRAMUSCULAR; INTRAVENOUS at 15:21

## 2019-12-04 RX ADMIN — AMPICILLIN SODIUM AND SULBACTAM SODIUM 3 G: 2; 1 INJECTION, POWDER, FOR SOLUTION INTRAMUSCULAR; INTRAVENOUS at 06:05

## 2019-12-04 RX ADMIN — AMPICILLIN SODIUM AND SULBACTAM SODIUM 3 G: 2; 1 INJECTION, POWDER, FOR SOLUTION INTRAMUSCULAR; INTRAVENOUS at 21:33

## 2019-12-04 RX ADMIN — ACETAMINOPHEN 1000 MG: 500 TABLET, FILM COATED ORAL at 15:21

## 2019-12-04 RX ADMIN — METOPROLOL TARTRATE 5 MG: 5 INJECTION, SOLUTION INTRAVENOUS at 04:03

## 2019-12-04 RX ADMIN — ESCITALOPRAM OXALATE 20 MG: 20 TABLET, FILM COATED ORAL at 08:36

## 2019-12-04 RX ADMIN — GABAPENTIN 600 MG: 300 CAPSULE ORAL at 08:36

## 2019-12-04 RX ADMIN — DIAZEPAM 4 MG: 2 TABLET ORAL at 16:11

## 2019-12-04 RX ADMIN — ACETAMINOPHEN 1000 MG: 500 TABLET, FILM COATED ORAL at 08:36

## 2019-12-04 RX ADMIN — ACETAMINOPHEN 1000 MG: 500 TABLET, FILM COATED ORAL at 01:40

## 2019-12-04 NOTE — PLAN OF CARE
Problem: Patient Care Overview  Goal: Plan of Care Review  Outcome: Ongoing (interventions implemented as appropriate)   12/04/19 2095   Coping/Psychosocial   Plan of Care Reviewed With patient   Plan of Care Review   Progress no change   OTHER   Outcome Summary VSS. Hgb down to 7.5 today, Heparin gtt stopped, 2 units PRBC given. Ambulated in hallway. Braswell removed, +void. Dilaudid and valium given x1. Ostomy output 900, Dk output 95. K Phos started for phos level 1.8. Gabapentin dose decresed. Okay to shower, said  will help her tonight. Diet advanced to GI soft. No c/o nausea.      Goal: Individualization and Mutuality  Outcome: Ongoing (interventions implemented as appropriate)    Goal: Discharge Needs Assessment  Outcome: Ongoing (interventions implemented as appropriate)    Goal: Interprofessional Rounds/Family Conf  Outcome: Ongoing (interventions implemented as appropriate)      Problem: Surgery Nonspecified (Adult)  Goal: Signs and Symptoms of Listed Potential Problems Will be Absent, Minimized or Managed (Surgery Nonspecified)  Outcome: Ongoing (interventions implemented as appropriate)      Problem: Pain, Acute (Adult)  Goal: Acceptable Pain Control/Comfort Level  Outcome: Ongoing (interventions implemented as appropriate)      Problem: Ileostomy (Adult)  Goal: Signs and Symptoms of Listed Potential Problems Will be Absent, Minimized or Managed (Ileostomy)  Outcome: Ongoing (interventions implemented as appropriate)

## 2019-12-04 NOTE — NURSING NOTE
WOCN: patient receiving blood , sleeping. Mother observed pouch change. She is planning to assist patient when DC. Place in 2 3/4 2 piece merritt drainable . Red rubber cath in place stoma.  Stoma red, good stool and flatus output. Instructed regarding frequency pouch change, emptying, showering and types of pouches. Discussed possibility patient wearing convex pouch with belt. Mother very interested in ostomy care. Educational materials left in room. Hopefully patient will feel better Friday to observe pouch change and education.      12/04/19 1015   Ileostomy Loop RLQ   Placement Date/Time: 12/02/19 1227   Inserted by: Dr. Ye  Ileostomy Type: Loop  Location: RLQ   Stomal Appliance 2 piece;Clean;Dry;Intact;Changed   Stoma Appearance round;bridge in place  (red rubber catheter)   Peristomal Assessment Clean;Intact   Accessories/Skin Care cleansed with water;skin barrier ring   Stoma Function flatus;stool   Stool Color brown   Stool Consistency liquid   Treatment Bag change   Output (mL) 100 mL

## 2019-12-04 NOTE — PROGRESS NOTES
Progress Note    POD2 s/p laparoscopic low anterior colon resection with mobilization of splenic flexure and diverting loop ileostomy    S: positive flatus,  positive BM. positive ambulating. Pain controlled with ERAS protocol scheduled Tylenol, Celebrex, and gabapentin, IV robaxin, as well as IV dilaudid for breakthrough pain    Pt felt sleepy and lethargic this morning, but states she feels that she is perking up.  Braswell not d/c'ed yet    She is tolerating a soft diet with no n/v    O:  Temp:  [97.1 °F (36.2 °C)-98.8 °F (37.1 °C)] 98.4 °F (36.9 °C)  Heart Rate:  [] 91  Resp:  [16-20] 16  BP: ()/(55-89) 122/74      Intake/Output Summary (Last 24 hours) at 12/4/2019 1252  Last data filed at 12/4/2019 1216  Gross per 24 hour   Intake 600 ml   Output 3485 ml   Net -2885 ml       Abd: soft, appropriately tender, non-distended  Incision: clean, dry, intact  Stoma: pink      Results from last 7 days   Lab Units 12/04/19  0747 12/04/19  0600   WBC 10*3/mm3  --  9.20   HEMOGLOBIN g/dL 7.5* 7.7*   HEMATOCRIT % 22.8* 22.7*   PLATELETS 10*3/mm3  --  216     Results from last 7 days   Lab Units 12/04/19  0600   SODIUM mmol/L 140   POTASSIUM mmol/L 4.1   CHLORIDE mmol/L 106   CO2 mmol/L 24.3   BUN mg/dL 11   CREATININE mg/dL 0.58   GLUCOSE mg/dL 107*   CALCIUM mg/dL 7.9*   PHOSPHORUS mg/dL 1.8*       Results from last 7 days   Lab Units 12/03/19  0355   MAGNESIUM mg/dL 2.3         A/P: 39 y.o. female POD2 s/p laparoscopic low anterior colon resection with mobilization of splenic flexure and diverting loop ileostomy    -afebrile, VSS  -decrease gabapentin dosage  -she is receiving 2 units pRBC   -she is tolerating GI soft  -voiding trial  -increase ambulation       Scribed for Padmaja Ye MD by Parul Rios PA-C. 12/4/2019  12:52 PM   This patient was evaluated by me, recommendations made, documentation reviewed, edited, and revised by me, Padmaja Ye MD

## 2019-12-04 NOTE — TELEPHONE ENCOUNTER
Anette Kendrick [facility] [phone] would like for you to call her regarding patient she said nothing else but that she will wait for your call to speak with you about it.    Thank you.

## 2019-12-04 NOTE — PROGRESS NOTES
Progress Note    POD2 s/p laparoscopic low anterior colon resection with mobilization of splenic flexure and diverting loop ileostomy: ERAS    S: positive flatus,  positive BM. positive ambulating. Pain managed with with ERAS protocol scheduled Tylenol, Celebrex, and gabapentin, IV robaxin, as well as IV dilaudid for breakthrough pain    Pt states she is feeling much better today.  No n/v with clears, and she would like to try a more substantial diet    No CP or SOB    O:  Temp:  [98.2 °F (36.8 °C)-98.8 °F (37.1 °C)] 98.3 °F (36.8 °C)  Heart Rate:  [] 94  Resp:  [16-20] 20  BP: ()/(55-89) 96/55      Intake/Output Summary (Last 24 hours) at 12/4/2019 0728  Last data filed at 12/4/2019 0634  Gross per 24 hour   Intake 300 ml   Output 3165 ml   Net -2865 ml   Braswell: 2150cc clear yellow urine  MAGAN: 440cc sanguinous  Ileo: 575cc dark brown liquid output    Abd: soft, appropriately tender, non-distended  Incision: clean, dry, intact  Stoma: pink      Results from last 7 days   Lab Units 12/04/19  0747 12/04/19  0600 12/03/19  0355   WBC 10*3/mm3  --  9.20 10.39   HEMOGLOBIN g/dL 7.5* 7.7* 11.3*   HEMATOCRIT % 22.8* 22.7* 33.0*   PLATELETS 10*3/mm3  --  216 301     Results from last 7 days   Lab Units 12/04/19  0600 12/03/19  0355   SODIUM mmol/L 140 135*   POTASSIUM mmol/L 4.1 4.2   CHLORIDE mmol/L 106 101   CO2 mmol/L 24.3 21.2*   BUN mg/dL 11 8   CREATININE mg/dL 0.58 0.49*   GLUCOSE mg/dL 107* 158*   CALCIUM mg/dL 7.9* 7.6*   PHOSPHORUS mg/dL 1.8*  --        A/P: 39 y.o. female POD2 s/p laparoscopic low anterior colon resection with mobilization of splenic flexure and diverting loop ileostomy: ERAS    -afebrile.  HR & BP improved with scheduled metoprolol  -H/H down; re-draw unchanged.  Hold heparin drip and transfuse 2 units pRBC per conversation with Dr. Ye  -replace phos  -advance diet to GI soft as tolerated  -voiding trial  -increase ambulation       Parul Rios PA-C  7:28 AM

## 2019-12-04 NOTE — OP NOTE
12/04/19    Surgeon: Padmaja Ye MD    Surgical  Assistant: Parul Rios PA-C     Preoperative diagnosis: Rectal cancer (CMS/HCC) [C20]    Post-Op Diagnosis Codes:     * Rectal cancer (CMS/HCC) [C20]    Procedure: laparoscopic low anterior colon resection with mobilization of splenic flexure and diverting loop ileostomy: ERAS, * Panel 2 does not exist *    Estimated Blood Loss: 150 mL    Specimens:   Specimens     ID Source Type Tests Collected By Collected At Frozen?      A Large Intestine, Sigmoid Colon Tissue · TISSUE PATHOLOGY EXAM   Padmaja Ye MD 12/2/19 1156 No     Description: SIGMOID AND RECTUM    B Large Intestine, Sigmoid Colon Tissue · TISSUE PATHOLOGY EXAM   Padmaja Ye MD 12/2/19 1207 No     Description: proximal anastamotic ring    C Large Intestine, Sigmoid Colon Tissue · TISSUE PATHOLOGY EXAM   Padmaja Ye MD 12/2/19 1207 No     Description: distal anastamotic ring           Order Name Source Comment Collection Info Order Time   HCG, SERUM, QUALITATIVE   Collected By: Cherie Arriaga RN 12/2/2019  8:31 AM   BASIC METABOLIC PANEL   Collected By: Analia Lenz 12/3/2019 12:03 AM   MAGNESIUM   Collected By: Analia Lenz 12/3/2019 12:03 AM   PROTIME-INR   Collected By: Analia Lenz 12/3/2019 12:03 AM   APTT   Collected By: Analia Lenz 12/3/2019 12:03 AM   TISSUE PATHOLOGY EXAM Large Intestine, Sigmoid Colon  Collected By: Padmaja Ye MD 12/2/2019 11:58 AM       Indication:  Carole Weaver is a 39 y.o. female who comes in with Rectal cancer (CMS/HCC)  Patient understands risks, benefits,and alternatives wishes to proceed.      Procedure Details:.  Patient was brought into the operating room with SCDs in place and antibiotics infused.  After general anesthesia was achieved, patient was placed in Yellofin lithotomy position.  A TAP block was done by Anesthesia.  The patient was then prepped and draped in sterile fashion.  Used 0.25% Marcaine with epinephrine as a  local infiltration at the trocar site.  First, I made a periumbilical incision and carried it down to the fascia.  I used a Veress needle to incise the fascia and insufflated up to 15 mmHg.  Then used a 12 Visiport trocar, a 10, 0° scope and entered the abdomen under direct visualization.  I put 2 trocars in the right lower quadrant and one in the left mid quadrant.  I started mobilizing the left colon along the white line of Toldt up to the splenic flexure.  I got the omentum off the distal transverse colon and keeping the spleen visualized and out of the way, was able to take the splenic flexure down.  At this point, we went into steep Trendelenburg and started to mobilize the sigmoid colon, keeping close attention to the left ureter and then lifting up the sigmoid colon and entering the retrorectal space just distal to the LIZZIE making an incision in the peritoneum and getting into the retrorectal space.  I visualized the tumor, which was anterior and right at the peritoneal reflection.  Anteriorly, there was a puckered area that was visualized, and this was cephalad to the tattoo, which was the tumor.  I was able to get distal posteriorly and started mobilizing anteriorly, and was trying to get the lateral stalks and was tugging on the colon above the tumor and the area that had an ulceration at the anterior side of the rectum, at the tumor, ruptured and stool came out.  I quickly put the laparoscopic suction into this and there was very minimal contamination.  I then converted to open. I made a lower midline incision, carried it down to the fascia and incised the fascia.  I put a medium Scot wound retractor in and then I took the suction  out of the hole and used 0 Vicryl to close the hole that was made. I made an incision in the mesentery about 10-12 cm cephalad to the tumor and used a 75 MARIALUISA to staple across the sigmoid colon and then started taking the mesentery in between clamps and 0 Vicryl ties.   I then was able to get into the left side all the way down past the tumor then used a green load of the Contour stapler 2-3 cm distal to the tumor.  I took the specimen out and sent it off to pathology.  I then irrigated out the abdomen with a liter of water and then 2 L of Betadine-tinged saline.  Then with Allis clamps I took the distal end of the staple line and excised it and used a 3-0 PDS in a pursestring fashion and put a 28 anvil in to make the anastomosis.  Parul went below and brought up EEA sizers.  Then, Parul brought up the stapler, brought the pin out anterior to the staple line and then I put the descending colon with the anvil to the stapler and closed the stapler. I waited a minute and then fired the stapler.  I tested the anastomosis with a rigid sigmoidoscopy under water.  There was 1 small air bubble that came out anteriorly. I closed this with a Lembert type suture with a 2-0 Vicryl, then irrigated out the abdomen again with 3L of saline and put 10 ml of tessel around the anastomosis.  I put a drain posterior and wrapped it around to the anterior anastomosis and brought it out in the left lower quadrant.  I then found the terminal ileum at the ileocecal valve and 30 cm anterior to this marked the distal end of the terminal ileum.  Used umbilical tape across the mesentery to make my ileostomy.  I made an elliptical incision in the skin right where the patient was marked for her ileostomy, carried it down to the fascia, incised the fascia, sweeping the rectus muscle out of the way.  Incised the perineum and then brought the ileum out through the abdominal wall.  I made sure there was no twist in the mesentery and then closed the 12 trocar site periumbilically with 2-0 Vicryl. I closed the midline incision with a #1 looped PDS. I did place a sheet of Seprafilm down along the abdominal wall before this and irrigated out the subcutaneous tissue and closed the skin with 4-0 Monocryl.    The  ileostomy was then matured with 3-0 Vicryl.  All instrument, lap counts and needle counts were correct.  The patient was stable throughout the entire case and was taken to recovery.

## 2019-12-04 NOTE — PLAN OF CARE
Problem: Patient Care Overview  Goal: Plan of Care Review   12/04/19 1019   Coping/Psychosocial   Plan of Care Reviewed With family   Plan of Care Review   Progress improving       Problem: Ileostomy (Adult)  Goal: Signs and Symptoms of Listed Potential Problems Will be Absent, Minimized or Managed (Ileostomy)  Outcome: Ongoing (interventions implemented as appropriate)   12/04/19 1019   Goal/Outcome Evaluation   Problems Assessed (Ileostomy) stomal complications   Problems Present (Ileostomy) none

## 2019-12-04 NOTE — PAYOR COMM NOTE
"Carole Weaver (39 y.o. Female)     ATTN: NURSE REVIEW   REF#[id number]  PLEASE CALL BACK TO HUONG [phone] OR [phone]  THANKS!   HUONG      Date of Birth Social Security Number Address Home Phone MRN    1980  [id number] [facility] [address] [phone] [id number]    Congregation Marital Status          Amish        Admission Date Admission Type Admitting Provider Attending Provider Department, Room/Bed    12/2/19 Elective Padmaja Ye MD Allen, Nechol L, MD [address], [address]    Discharge Date Discharge Disposition Discharge Destination                       Attending Provider:  Padmaja Ye MD    Allergies:  No Known Allergies    Isolation:  None   Infection:  None   Code Status:  CPR    Ht:  167.6 cm (66\")   Wt:  102 kg (224 lb 9.6 oz)    Admission Cmt:  None   Principal Problem:  Rectal cancer (CMS/HCC) [C20] More...                 Active Insurance as of 12/2/2019     Primary Coverage     Payor Plan Insurance Group Employer/Plan Group    [employer]UE [employer]UE [employer] PPO [id number]     Payor Plan Address Payor Plan Phone Number Payor Plan Fax Number Effective Dates    [address] [phone]  1/1/2018 - None Entered    [facility] [address]       Subscriber Name Subscriber Birth Date Member ID       CAROLE WEAVER 1980 [id number]                 Emergency Contacts      (Rel.) Home Phone Work Phone Mobile Phone    Justus Weaver (Spouse) [phone] -- --    ROSEANNEDENIS BARBOSA (Mother) -- -- [phone]            Vital Signs (last day)     Date/Time   Temp   Temp src   Pulse   Resp   BP   Patient Position   SpO2    12/04/19 1244   98.4 (36.9)   Oral   91   16   122/74   --   94    12/04/19 1216   97.1 (36.2)   Oral   93   16   122/72   Lying   94    12/04/19 1052   --   --   95   --   116/64   Lying   --    12/04/19 0951   98.8 (37.1)   Oral   89   16   124/71   Lying   94    12/04/19 0925   98.7 " (37.1)   Oral   88   16   114/69   Lying   95    12/04/19 0821   97.9 (36.6)   Oral   91   16   132/74   Lying   94    12/03/19 2312   98.3 (36.8)   Oral   94   20   96/55   Lying   93    12/03/19 1945   98.2 (36.8)   Oral   109   20   100/67   Lying   93    12/03/19 1700   --   --   114   --   --   --   --    12/03/19 1622   --   --   130  (Abnormal)    --   135/81   --   --    12/03/19 1325   98.8 (37.1)   Oral   115   16   148/89   Lying   92    12/03/19 0726   98.8 (37.1)   Oral   117   16   137/97   Lying   97    12/03/19 0606   --   --   108   --   --   --   --    12/03/19 0401   --   --   112   --   150/94   Lying   97    12/03/19 0225   --   --   122  (Abnormal)    --   160/106  (Abnormal)    --   97              Intake & Output (last day)       12/03 0701 - 12/04 0700 12/04 0701 - 12/05 0700    P.O. 0     I.V. (mL/kg)      Blood  300    IV Piggyback 300     Total Intake(mL/kg) 300 (2.9) 300 (2.9)    Urine (mL/kg/hr) 2150 (0.9) 925 (1.3)    Drains 440 60    Stool 575 475    Blood      Total Output 3169 1460    Net -2865 -116              [facility] Medications (active)       Dose Frequency Start End    acetaminophen (TYLENOL) tablet 1,000 mg 1,000 mg Every 6 Hours 12/2/2019     Sig - Route: Take 2 tablets by mouth Every 6 (Six) Hours. - Oral    ampicillin-sulbactam (UNASYN) 3 g in sodium chloride 0.9 % 100 mL IVPB-MBP 3 g Every 8 Hours 12/2/2019 12/7/2019    Sig - Route: Infuse 3 g into a venous catheter Every 8 (Eight) Hours. - Intravenous    celecoxib (CeleBREX) capsule 200 mg 200 mg Every 12 Hours Scheduled 12/2/2019 12/5/2019    Sig - Route: Take 1 capsule by mouth Every 12 (Twelve) Hours. - Oral    dextrose 5 % and sodium chloride 0.45 % with KCl 20 mEq/L infusion 75 mL/hr Continuous 12/4/2019     Sig - Route: Infuse 75 mL/hr into a venous catheter Continuous. - Intravenous    Cosign for Ordering: Accepted by Padmaja Ye MD on 12/4/2019 10:15 AM    diazePAM (VALIUM) tablet 4 mg 4 mg Every 6 Hours  "PRN 12/3/2019 12/13/2019    Sig - Route: Take 2 tablets by mouth Every 6 (Six) Hours As Needed (walk). - Oral    Cosign for Ordering: Accepted by Padmaja Ye MD on 12/3/2019  4:12 PM    escitalopram (LEXAPRO) tablet 20 mg 20 mg Daily 12/3/2019     Sig - Route: Take 1 tablet by mouth Daily. - Oral    famotidine (PEPCID) injection 20 mg 20 mg Every 12 Hours Scheduled 12/2/2019     Sig - Route: Infuse 2 mL into a venous catheter Every 12 (Twelve) Hours. - Intravenous    gabapentin (NEURONTIN) capsule 300 mg 300 mg 2 Times Daily 12/4/2019 12/5/2019    Sig - Route: Take 1 capsule by mouth 2 (Two) Times a Day. - Oral    Cosign for Ordering: Accepted by Padmaja Ye MD on 12/4/2019 12:59 PM    HYDROmorphone (DILAUDID) injection 0.25 mg 0.25 mg Every 3 Hours PRN 12/2/2019 12/12/2019    Sig - Route: Infuse 0.25 mL into a venous catheter Every 3 (Three) Hours As Needed for Severe Pain . - Intravenous    Linked Group 1:  \"And\" Linked Group Details        Methocarbamol (ROBAXIN) 1,000 mg in sodium chloride 0.9 % 100 mL IVPB 1,000 mg Every 8 Hours PRN 12/3/2019 12/6/2019    Sig - Route: Infuse 1,000 mg into a venous catheter Every 8 (Eight) Hours As Needed (muscle spasm). - Intravenous    Cosign for Ordering: Accepted by Padmaja Ye MD on 12/3/2019  8:54 AM    metoprolol tartrate (LOPRESSOR) injection 5 mg 5 mg Every 6 Hours 12/3/2019     Sig - Route: Infuse 5 mL into a venous catheter Every 6 (Six) Hours. - Intravenous    Cosign for Ordering: Accepted by Padmaja Ye MD on 12/3/2019  4:12 PM    naloxone (NARCAN) injection 0.4 mg 0.4 mg Every 5 Minutes PRN 12/2/2019     Sig - Route: Infuse 1 mL into a venous catheter Every 5 (Five) Minutes As Needed for Respiratory Depression. - Intravenous    Linked Group 1:  \"And\" Linked Group Details        nitroglycerin (NITROSTAT) SL tablet 0.4 mg 0.4 mg Every 5 Minutes PRN 12/2/2019     Sig - Route: Place 1 tablet under the tongue Every 5 (Five) Minutes As Needed for " Chest Pain (if systolic BP greater than 100 mm/Hg.). - Sublingual    ondansetron (ZOFRAN) injection 4 mg 4 mg Every 6 Hours PRN 12/2/2019     Sig - Route: Infuse 2 mL into a venous catheter Every 6 (Six) Hours As Needed for Nausea or Vomiting. - Intravenous    phosphorus (K PHOS NEUTRAL) tablet 2 tablet 500 mg 2 Times Daily 12/4/2019 12/5/2019    Sig - Route: Take 2 tablets by mouth 2 (Two) Times a Day. - Oral    Cosign for Ordering: Accepted by Padmaja Ye MD on 12/4/2019  8:04 AM    enalaprilat (VASOTEC) injection 0.625 mg (Discontinued) 0.625 mg Every 6 Hours PRN 12/2/2019 12/4/2019    Sig - Route: Infuse 0.5 mL into a venous catheter Every 6 (Six) Hours As Needed for High Blood Pressure (sbp>160 or dpb>90). - Intravenous    gabapentin (NEURONTIN) capsule 600 mg (Discontinued) 600 mg 2 Times Daily 12/2/2019 12/4/2019    Sig - Route: Take 2 capsules by mouth 2 (Two) Times a Day. - Oral    heparin 24474 units/250 mL (100 units/mL) in 0.45 % NaCl infusion (Discontinued) 14.7 Units/kg/hr × 102 kg Titrated 12/3/2019 12/4/2019    Sig - Route: Infuse 1,499 Units/hr into a venous catheter Dose Adjusted By Provider As Needed. - Intravenous    lactated ringers infusion (Discontinued) 125 mL/hr Continuous 12/2/2019 12/4/2019    Sig - Route: Infuse 125 mL/hr into a venous catheter Continuous. - Intravenous             Operative/Procedure Notes (all)      Padmaja Ye MD at 12/02/19 1004  Version 1 of 1         COLON RESECTION LAPAROSCOPIC SIGMOID OR LOW ANTERIOR  Progress Note    Carole Weaver  12/2/2019    Pre-op Diagnosis:   Rectal cancer (CMS/HCC) [C20]       Post-Op Diagnosis Codes:     * Rectal cancer (CMS/HCC) [C20]    Procedure/CPT® Codes:      Procedure(s):  laparoscopic low anterior colon resection with mobilization of splenic flexure and diverting loop ileostomy: ERAS    Surgeon(s):  Padmaja Ye MD    Anesthesia: General with Block    Staff:   Circulator: Jeff Stephen RN; Floresita Olsen RN  Scrub  Person: Sakina Monte RN; Helen Celis  Assistant: Parul Rios PA-C    Estimated Blood Loss: 150 mL    Urine Voided: 160 mL    Specimens:                Specimens     ID Source Type Tests Collected By Collected At Frozen?      A Large Intestine, Sigmoid Colon Tissue · TISSUE PATHOLOGY EXAM   Padmaja Ye MD 12/2/19 1156      Description: SIGMOID AND RECTUM    B Large Intestine, Sigmoid Colon Tissue · TISSUE PATHOLOGY EXAM   Padmaja Ye MD 12/2/19 1207 No     Description: proximal anastamotic ring    C Large Intestine, Sigmoid Colon Tissue · TISSUE PATHOLOGY EXAM   Padmaja Ye MD 12/2/19 1207 No     Description: distal anastamotic ring                Drains:   Closed/Suction Drain Abdomen Bulb 19 Fr. (Active)   Dressing Status Clean;Dry;Intact 12/2/2019  1:20 PM   Drainage Appearance Serosanguineous 12/2/2019  1:20 PM   Status To bulb suction 12/2/2019  1:20 PM       Ileostomy Loop RLQ (Active)   Stomal Appliance 2 piece;Clean;Dry;Intact 12/2/2019  1:20 PM   Stoma Appearance round;red 12/2/2019  1:20 PM       Urethral Catheter Non-latex 16 Fr. (Active)   Daily Indications Selected surgeries ( tract, abdomen) 12/2/2019  1:20 PM   Collection Container Standard drainage bag 12/2/2019  1:20 PM   Securement Method Securing device 12/2/2019  1:20 PM       Findings:     Complications:      Padmaja Ye MD     Date: 12/2/2019  Time: 2:16 PM        Electronically signed by Padmaja Ye MD at 12/02/19 1416     Padmaja Ye MD at 12/02/19 1004  Version 1 of 1         12/04/19    Surgeon: Padmaja Ye MD    Surgical  Assistant: Parul Rios PA-C     Preoperative diagnosis: Rectal cancer (CMS/HCC) [C20]    Post-Op Diagnosis Codes:     * Rectal cancer (CMS/HCC) [C20]    Procedure: laparoscopic low anterior colon resection with mobilization of splenic flexure and diverting loop ileostomy: ERAS, * Panel 2 does not exist *    Estimated Blood Loss: 150 mL    Specimens:   Specimens     ID  Source Type Tests Collected By Collected At Frozen?      A Large Intestine, Sigmoid Colon Tissue · TISSUE PATHOLOGY EXAM   Padmaja Ye MD 12/2/19 1156 No     Description: SIGMOID AND RECTUM    B Large Intestine, Sigmoid Colon Tissue · TISSUE PATHOLOGY EXAM   Padmaja Ye MD 12/2/19 1207 No     Description: proximal anastamotic ring    C Large Intestine, Sigmoid Colon Tissue · TISSUE PATHOLOGY EXAM   Padmaja Ye MD 12/2/19 1207 No     Description: distal anastamotic ring           Order Name Source Comment Collection Info Order Time   HCG, SERUM, QUALITATIVE   Collected By: Cherie Arriaga RN 12/2/2019  8:31 AM   BASIC METABOLIC PANEL   Collected By: Analia Lenz 12/3/2019 12:03 AM   MAGNESIUM   Collected By: Analia Lenz 12/3/2019 12:03 AM   PROTIME-INR   Collected By: Analia Lenz 12/3/2019 12:03 AM   APTT   Collected By: Analia Lenz 12/3/2019 12:03 AM   TISSUE PATHOLOGY EXAM Large Intestine, Sigmoid Colon  Collected By: Padmaja Ye MD 12/2/2019 11:58 AM       Indication:  Carole Weaver is a 39 y.o. female who comes in with Rectal cancer (CMS/HCC)  Patient understands risks, benefits,and alternatives wishes to proceed.      Procedure Details:.  Patient was brought into the operating room with SCDs in place and antibiotics infused.  After general anesthesia was achieved, patient was placed in Yellofin lithotomy position.  A TAP block was done by Anesthesia.  The patient was then prepped and draped in sterile fashion.  Used 0.25% Marcaine with epinephrine as a local infiltration at the trocar site.  First, I made a periumbilical incision and carried it down to the fascia.  I used a Veress needle to incise the fascia and insufflated up to 15 mmHg.  Then used a 12 Visiport trocar, a 10, 0° scope and entered the abdomen under direct visualization.  I put 2 trocars in the right lower quadrant and one in the left mid quadrant.  I started mobilizing the left colon along the white line of  Toldt up to the splenic flexure.   Dictation on: 12/04/2019  1:09 PM by: PADMAJA ESPARZA [[id number]]         Electronically signed by Padmaja Esparza MD at 12/04/19 1309          Physician Progress Notes (last 24 hours) (Notes from 12/03/19 1411 through 12/04/19 1411)      Padmaja Esparza MD at 12/04/19 1252          Progress Note    POD2 s/p laparoscopic low anterior colon resection with mobilization of splenic flexure and diverting loop ileostomy    S: positive flatus,  positive BM. positive ambulating. Pain controlled with ERAS protocol scheduled Tylenol, Celebrex, and gabapentin, IV robaxin, as well as IV dilaudid for breakthrough pain    Pt felt sleepy and lethargic this morning, but states she feels that she is perking up.  Braswell not d/c'ed yet    She is tolerating a soft diet with no n/v    O:  Temp:  [97.1 °F (36.2 °C)-98.8 °F (37.1 °C)] 98.4 °F (36.9 °C)  Heart Rate:  [] 91  Resp:  [16-20] 16  BP: ()/(55-89) 122/74      Intake/Output Summary (Last 24 hours) at 12/4/2019 1252  Last data filed at 12/4/2019 1216  Gross per 24 hour   Intake 600 ml   Output 3485 ml   Net -2885 ml       Abd: soft, appropriately tender, non-distended  Incision: clean, dry, intact  Stoma: pink      Results from last 7 days   Lab Units 12/04/19  0747 12/04/19  0600   WBC 10*3/mm3  --  9.20   HEMOGLOBIN g/dL 7.5* 7.7*   HEMATOCRIT % 22.8* 22.7*   PLATELETS 10*3/mm3  --  216     Results from last 7 days   Lab Units 12/04/19  0600   SODIUM mmol/L 140   POTASSIUM mmol/L 4.1   CHLORIDE mmol/L 106   CO2 mmol/L 24.3   BUN mg/dL 11   CREATININE mg/dL 0.58   GLUCOSE mg/dL 107*   CALCIUM mg/dL 7.9*   PHOSPHORUS mg/dL 1.8*       Results from last 7 days   Lab Units 12/03/19  0355   MAGNESIUM mg/dL 2.3         A/P: 39 y.o. female POD2 s/p laparoscopic low anterior colon resection with mobilization of splenic flexure and diverting loop ileostomy    -afebrile, VSS  -decrease gabapentin dosage  -she is receiving 2 units pRBC   -she is  tolerating GI soft  -voiding trial  -increase ambulation       Scribed for Padmaja Ye MD by Parul Rois PA-C. 12/4/2019  12:52 PM   This patient was evaluated by me, recommendations made, documentation reviewed, edited, and revised by Padmaja cordoba MD          Electronically signed by Padmaja Ye MD at 12/04/19 1256     Parul Rios PA-C at 12/04/19 0728     Attestation signed by Padmaja Ye MD at 12/04/19 1050    I have reviewed the documentation above and agree.                    Progress Note    POD2 s/p laparoscopic low anterior colon resection with mobilization of splenic flexure and diverting loop ileostomy: ERAS    S: positive flatus,  positive BM. positive ambulating. Pain managed with with ERAS protocol scheduled Tylenol, Celebrex, and gabapentin, IV robaxin, as well as IV dilaudid for breakthrough pain    Pt states she is feeling much better today.  No n/v with clears, and she would like to try a more substantial diet    No CP or SOB    O:  Temp:  [98.2 °F (36.8 °C)-98.8 °F (37.1 °C)] 98.3 °F (36.8 °C)  Heart Rate:  [] 94  Resp:  [16-20] 20  BP: ()/(55-89) 96/55      Intake/Output Summary (Last 24 hours) at 12/4/2019 0728  Last data filed at 12/4/2019 0634  Gross per 24 hour   Intake 300 ml   Output 3165 ml   Net -2865 ml   Braswell: 2150cc clear yellow urine  MAGAN: 440cc sanguinous  Ileo: 575cc dark brown liquid output    Abd: soft, appropriately tender, non-distended  Incision: clean, dry, intact  Stoma: pink      Results from last 7 days   Lab Units 12/04/19  0747 12/04/19  0600 12/03/19  0355   WBC 10*3/mm3  --  9.20 10.39   HEMOGLOBIN g/dL 7.5* 7.7* 11.3*   HEMATOCRIT % 22.8* 22.7* 33.0*   PLATELETS 10*3/mm3  --  216 301     Results from last 7 days   Lab Units 12/04/19  0600 12/03/19  0355   SODIUM mmol/L 140 135*   POTASSIUM mmol/L 4.1 4.2   CHLORIDE mmol/L 106 101   CO2 mmol/L 24.3 21.2*   BUN mg/dL 11 8   CREATININE mg/dL 0.58 0.49*   GLUCOSE mg/dL 107* 158*    CALCIUM mg/dL 7.9* 7.6*   PHOSPHORUS mg/dL 1.8*  --        A/P: 39 y.o. female POD2 s/p laparoscopic low anterior colon resection with mobilization of splenic flexure and diverting loop ileostomy: ERAS    -afebrile.  HR & BP improved with scheduled metoprolol  -H/H down; re-draw unchanged.  Hold heparin drip and transfuse 2 units pRBC per conversation with Dr. Ye  -replace phos  -advance diet to GI soft as tolerated  -voiding trial  -increase ambulation       Parul Rios PA-C  7:28 AM    Electronically signed by Padmaja Ye MD at 12/04/19 1050       Consult Notes (last 24 hours) (Notes from 12/03/19 1411 through 12/04/19 1411)     No notes of this type exist for this encounter.

## 2019-12-04 NOTE — PLAN OF CARE
Problem: Patient Care Overview  Goal: Plan of Care Review  Outcome: Ongoing (interventions implemented as appropriate)   12/04/19 0638   Coping/Psychosocial   Plan of Care Reviewed With patient   OTHER   Outcome Summary VSS on RA. walked twice this shift-3 total laps. One dose each of PRN Robaxin, valium, and dilaudid given for c/o pain. heparin drip continued-decreased to 12.6 u/kg/hr, 350 liquid dark green ostomy output, 90 mL bloody MAGAN output, and 800 clear yellow UO this shift. No c/o n/v. clear liquids continued      Goal: Individualization and Mutuality  Outcome: Ongoing (interventions implemented as appropriate)    Goal: Discharge Needs Assessment  Outcome: Ongoing (interventions implemented as appropriate)    Goal: Interprofessional Rounds/Family Conf  Outcome: Ongoing (interventions implemented as appropriate)      Problem: Surgery Nonspecified (Adult)  Goal: Signs and Symptoms of Listed Potential Problems Will be Absent, Minimized or Managed (Surgery Nonspecified)  Outcome: Ongoing (interventions implemented as appropriate)      Problem: Pain, Acute (Adult)  Goal: Acceptable Pain Control/Comfort Level  Outcome: Ongoing (interventions implemented as appropriate)      Problem: Ileostomy (Adult)  Goal: Signs and Symptoms of Listed Potential Problems Will be Absent, Minimized or Managed (Ileostomy)  Outcome: Ongoing (interventions implemented as appropriate)

## 2019-12-05 LAB
ABO + RH BLD: NORMAL
ABO + RH BLD: NORMAL
ALBUMIN SERPL-MCNC: 2.5 G/DL (ref 3.5–5.2)
ANION GAP SERPL CALCULATED.3IONS-SCNC: 8 MMOL/L (ref 5–15)
APTT PPP: 32.3 SECONDS (ref 22.7–35.4)
BASOPHILS # BLD AUTO: 0.02 10*3/MM3 (ref 0–0.2)
BASOPHILS NFR BLD AUTO: 0.3 % (ref 0–1.5)
BH BB BLOOD EXPIRATION DATE: NORMAL
BH BB BLOOD EXPIRATION DATE: NORMAL
BH BB BLOOD TYPE BARCODE: 6200
BH BB BLOOD TYPE BARCODE: 6200
BH BB DISPENSE STATUS: NORMAL
BH BB DISPENSE STATUS: NORMAL
BH BB PRODUCT CODE: NORMAL
BH BB PRODUCT CODE: NORMAL
BH BB UNIT NUMBER: NORMAL
BH BB UNIT NUMBER: NORMAL
BUN BLD-MCNC: 7 MG/DL (ref 6–20)
BUN/CREAT SERPL: 11.7 (ref 7–25)
CALCIUM SPEC-SCNC: 7.5 MG/DL (ref 8.6–10.5)
CHLORIDE SERPL-SCNC: 109 MMOL/L (ref 98–107)
CO2 SERPL-SCNC: 25 MMOL/L (ref 22–29)
CREAT BLD-MCNC: 0.6 MG/DL (ref 0.57–1)
DEPRECATED RDW RBC AUTO: 45.6 FL (ref 37–54)
EOSINOPHIL # BLD AUTO: 0.17 10*3/MM3 (ref 0–0.4)
EOSINOPHIL NFR BLD AUTO: 2.1 % (ref 0.3–6.2)
ERYTHROCYTE [DISTWIDTH] IN BLOOD BY AUTOMATED COUNT: 13.6 % (ref 12.3–15.4)
GFR SERPL CREATININE-BSD FRML MDRD: 111 ML/MIN/1.73
GLUCOSE BLD-MCNC: 97 MG/DL (ref 65–99)
HCT VFR BLD AUTO: 28.6 % (ref 34–46.6)
HGB BLD-MCNC: 9.4 G/DL (ref 12–15.9)
IMM GRANULOCYTES # BLD AUTO: 0.05 10*3/MM3 (ref 0–0.05)
IMM GRANULOCYTES NFR BLD AUTO: 0.6 % (ref 0–0.5)
LYMPHOCYTES # BLD AUTO: 0.86 10*3/MM3 (ref 0.7–3.1)
LYMPHOCYTES NFR BLD AUTO: 10.8 % (ref 19.6–45.3)
MAGNESIUM SERPL-MCNC: 2.3 MG/DL (ref 1.6–2.6)
MCH RBC QN AUTO: 30.2 PG (ref 26.6–33)
MCHC RBC AUTO-ENTMCNC: 32.9 G/DL (ref 31.5–35.7)
MCV RBC AUTO: 92 FL (ref 79–97)
MONOCYTES # BLD AUTO: 0.6 10*3/MM3 (ref 0.1–0.9)
MONOCYTES NFR BLD AUTO: 7.5 % (ref 5–12)
NEUTROPHILS # BLD AUTO: 6.27 10*3/MM3 (ref 1.7–7)
NEUTROPHILS NFR BLD AUTO: 78.7 % (ref 42.7–76)
NRBC BLD AUTO-RTO: 0.1 /100 WBC (ref 0–0.2)
PHOSPHATE SERPL-MCNC: 2.3 MG/DL (ref 2.5–4.5)
PLATELET # BLD AUTO: 207 10*3/MM3 (ref 140–450)
PMV BLD AUTO: 9 FL (ref 6–12)
POTASSIUM BLD-SCNC: 4 MMOL/L (ref 3.5–5.2)
RBC # BLD AUTO: 3.11 10*6/MM3 (ref 3.77–5.28)
SODIUM BLD-SCNC: 142 MMOL/L (ref 136–145)
UNIT  ABO: NORMAL
UNIT  ABO: NORMAL
UNIT  RH: NORMAL
UNIT  RH: NORMAL
WBC NRBC COR # BLD: 7.97 10*3/MM3 (ref 3.4–10.8)

## 2019-12-05 PROCEDURE — 25010000002 HYDROMORPHONE PER 4 MG: Performed by: COLON & RECTAL SURGERY

## 2019-12-05 PROCEDURE — 83735 ASSAY OF MAGNESIUM: CPT | Performed by: PHYSICIAN ASSISTANT

## 2019-12-05 PROCEDURE — 25010000002 METHOCARBAMOL 1000 MG/10ML SOLUTION 10 ML VIAL: Performed by: PHYSICIAN ASSISTANT

## 2019-12-05 PROCEDURE — 85730 THROMBOPLASTIN TIME PARTIAL: CPT | Performed by: COLON & RECTAL SURGERY

## 2019-12-05 PROCEDURE — 80069 RENAL FUNCTION PANEL: CPT | Performed by: PHYSICIAN ASSISTANT

## 2019-12-05 PROCEDURE — 25010000003 AMPICILLIN-SULBACTAM PER 1.5 G: Performed by: COLON & RECTAL SURGERY

## 2019-12-05 PROCEDURE — 85025 COMPLETE CBC W/AUTO DIFF WBC: CPT | Performed by: PHYSICIAN ASSISTANT

## 2019-12-05 RX ORDER — GABAPENTIN 300 MG/1
300 CAPSULE ORAL EVERY 12 HOURS SCHEDULED
Status: DISCONTINUED | OUTPATIENT
Start: 2019-12-05 | End: 2019-12-07 | Stop reason: HOSPADM

## 2019-12-05 RX ORDER — CELECOXIB 200 MG/1
200 CAPSULE ORAL EVERY 12 HOURS SCHEDULED
Status: DISCONTINUED | OUTPATIENT
Start: 2019-12-05 | End: 2019-12-07 | Stop reason: HOSPADM

## 2019-12-05 RX ORDER — PHENAZOPYRIDINE HYDROCHLORIDE 100 MG/1
100 TABLET, FILM COATED ORAL 3 TIMES DAILY PRN
Status: DISCONTINUED | OUTPATIENT
Start: 2019-12-05 | End: 2019-12-07 | Stop reason: HOSPADM

## 2019-12-05 RX ADMIN — METHOCARBAMOL 1000 MG: 100 INJECTION, SOLUTION INTRAMUSCULAR; INTRAVENOUS at 20:49

## 2019-12-05 RX ADMIN — METOPROLOL TARTRATE 5 MG: 5 INJECTION, SOLUTION INTRAVENOUS at 16:30

## 2019-12-05 RX ADMIN — AMPICILLIN SODIUM AND SULBACTAM SODIUM 3 G: 2; 1 INJECTION, POWDER, FOR SOLUTION INTRAMUSCULAR; INTRAVENOUS at 05:15

## 2019-12-05 RX ADMIN — METOPROLOL TARTRATE 5 MG: 5 INJECTION, SOLUTION INTRAVENOUS at 20:31

## 2019-12-05 RX ADMIN — DIBASIC SODIUM PHOSPHATE, MONOBASIC POTASSIUM PHOSPHATE AND MONOBASIC SODIUM PHOSPHATE 2 TABLET: 852; 155; 130 TABLET ORAL at 09:08

## 2019-12-05 RX ADMIN — CELECOXIB 200 MG: 200 CAPSULE ORAL at 09:08

## 2019-12-05 RX ADMIN — DIBASIC SODIUM PHOSPHATE, MONOBASIC POTASSIUM PHOSPHATE AND MONOBASIC SODIUM PHOSPHATE 2 TABLET: 852; 155; 130 TABLET ORAL at 20:30

## 2019-12-05 RX ADMIN — ESCITALOPRAM OXALATE 20 MG: 20 TABLET, FILM COATED ORAL at 09:08

## 2019-12-05 RX ADMIN — POTASSIUM CHLORIDE, DEXTROSE MONOHYDRATE AND SODIUM CHLORIDE 75 ML/HR: 150; 5; 450 INJECTION, SOLUTION INTRAVENOUS at 07:15

## 2019-12-05 RX ADMIN — HYDROMORPHONE HYDROCHLORIDE 0.25 MG: 10 INJECTION INTRAMUSCULAR; INTRAVENOUS; SUBCUTANEOUS at 01:11

## 2019-12-05 RX ADMIN — ACETAMINOPHEN 1000 MG: 500 TABLET, FILM COATED ORAL at 20:31

## 2019-12-05 RX ADMIN — METHOCARBAMOL 1000 MG: 100 INJECTION, SOLUTION INTRAMUSCULAR; INTRAVENOUS at 00:59

## 2019-12-05 RX ADMIN — GABAPENTIN 300 MG: 300 CAPSULE ORAL at 20:30

## 2019-12-05 RX ADMIN — FAMOTIDINE 20 MG: 10 INJECTION INTRAVENOUS at 20:31

## 2019-12-05 RX ADMIN — AMPICILLIN SODIUM AND SULBACTAM SODIUM 3 G: 2; 1 INJECTION, POWDER, FOR SOLUTION INTRAMUSCULAR; INTRAVENOUS at 13:54

## 2019-12-05 RX ADMIN — GABAPENTIN 300 MG: 300 CAPSULE ORAL at 09:08

## 2019-12-05 RX ADMIN — FAMOTIDINE 20 MG: 10 INJECTION INTRAVENOUS at 09:08

## 2019-12-05 RX ADMIN — ACETAMINOPHEN 1000 MG: 500 TABLET, FILM COATED ORAL at 09:08

## 2019-12-05 RX ADMIN — AMPICILLIN SODIUM AND SULBACTAM SODIUM 3 G: 2; 1 INJECTION, POWDER, FOR SOLUTION INTRAMUSCULAR; INTRAVENOUS at 22:13

## 2019-12-05 RX ADMIN — ACETAMINOPHEN 1000 MG: 500 TABLET, FILM COATED ORAL at 02:54

## 2019-12-05 RX ADMIN — METOPROLOL TARTRATE 5 MG: 5 INJECTION, SOLUTION INTRAVENOUS at 09:08

## 2019-12-05 RX ADMIN — DIAZEPAM 4 MG: 2 TABLET ORAL at 13:52

## 2019-12-05 RX ADMIN — CELECOXIB 200 MG: 200 CAPSULE ORAL at 20:30

## 2019-12-05 RX ADMIN — METOPROLOL TARTRATE 5 MG: 5 INJECTION, SOLUTION INTRAVENOUS at 02:54

## 2019-12-05 RX ADMIN — ACETAMINOPHEN 1000 MG: 500 TABLET, FILM COATED ORAL at 13:52

## 2019-12-05 NOTE — PROGRESS NOTES
"Progress Note    POD3 s/p laparoscopic low anterior colon resection with mobilization of splenic flexure and diverting loop ileostomy    S: positive flatus,  positive BM. positive ambulating. Pain managed with ERAS protocol scheduled Tylenol, Celebrex, and gabapentin, IV robaxin, as well as IV dilaudid for breakthrough pain    She is tolerating a soft diet well with no nausea or vomiting    She is urinating without difficulty s/p manzo d/c yesterday.  No burning or blood that she has noted, but she states her bladder feels \"sore\"    No CP or SOB    O:  Temp:  [97.1 °F (36.2 °C)-98.8 °F (37.1 °C)] 98.8 °F (37.1 °C)  Heart Rate:  [91-94] 91  Resp:  [16] 16  BP: (122-135)/(72-80) 127/77      Intake/Output Summary (Last 24 hours) at 12/5/2019 1203  Last data filed at 12/5/2019 0908  Gross per 24 hour   Intake 1040 ml   Output 3465 ml   Net -2425 ml   UOP: 1575cc  MAGAN: 165cc serosang  Ileo: 1750cc dark brown liquid drainage    Abd: soft, appropriately tender, non-distended  Incision: clean, dry, intact  Stoma: pink      Results from last 7 days   Lab Units 12/05/19  0511 12/04/19  0747 12/04/19  0600 12/03/19  0355   WBC 10*3/mm3 7.97  --  9.20 10.39   HEMOGLOBIN g/dL 9.4* 7.5* 7.7* 11.3*   HEMATOCRIT % 28.6* 22.8* 22.7* 33.0*   PLATELETS 10*3/mm3 207  --  216 301     Results from last 7 days   Lab Units 12/05/19  0511 12/04/19  0600 12/03/19  0355   SODIUM mmol/L 142 140 135*   POTASSIUM mmol/L 4.0 4.1 4.2   CHLORIDE mmol/L 109* 106 101   CO2 mmol/L 25.0 24.3 21.2*   BUN mg/dL 7 11 8   CREATININE mg/dL 0.60 0.58 0.49*   GLUCOSE mg/dL 97 107* 158*   CALCIUM mg/dL 7.5* 7.9* 7.6*   PHOSPHORUS mg/dL 2.3* 1.8*  --      Results from last 7 days   Lab Units 12/05/19  0511   MAGNESIUM mg/dL 2.3         A/P: 39 y.o. female POD3 s/p laparoscopic low anterior colon resection with mobilization of splenic flexure and diverting loop ileostomy    -afebrile, VSS  -H/H improved with appropriate response to 2 units pRBC " yesterday  -replace phos  -successful voiding trial.  Azo prn bladder discomfort  -high-output ileostomy: discussion with pt output will thicken up once she has been on a regular diet for a few days  -increase ambulation     Parul Rios PA-C  12:03 PM

## 2019-12-05 NOTE — PLAN OF CARE
Problem: Patient Care Overview  Goal: Plan of Care Review  Outcome: Ongoing (interventions implemented as appropriate)   12/05/19 0749   Coping/Psychosocial   Plan of Care Reviewed With patient   OTHER   Outcome Summary VSS. 750 light brown output from iliostomy and 70 MAGAN output this shift. Walked once and sat up to take bath. Pain controlled with 2 doses PRN dilaudid, 1 valium, 1 robaxin. Tolerating reg diet.       12/05/19 0749   Coping/Psychosocial   Plan of Care Reviewed With patient   OTHER   Outcome Summary VSS. 750 light brown output from iliostomy and 70 MAGAN output this shift. Walked once and sat up to take bath. Pain controlled with 2 doses PRN dilaudid, 1 valium, 1 robaxin. Tolerating reg diet. Good urine output.      Goal: Individualization and Mutuality  Outcome: Ongoing (interventions implemented as appropriate)    Goal: Discharge Needs Assessment  Outcome: Ongoing (interventions implemented as appropriate)    Goal: Interprofessional Rounds/Family Conf  Outcome: Ongoing (interventions implemented as appropriate)      Problem: Surgery Nonspecified (Adult)  Goal: Signs and Symptoms of Listed Potential Problems Will be Absent, Minimized or Managed (Surgery Nonspecified)  Outcome: Ongoing (interventions implemented as appropriate)      Problem: Pain, Acute (Adult)  Goal: Acceptable Pain Control/Comfort Level  Outcome: Ongoing (interventions implemented as appropriate)

## 2019-12-05 NOTE — PLAN OF CARE
Problem: Patient Care Overview  Goal: Plan of Care Review  Outcome: Ongoing (interventions implemented as appropriate)   12/05/19 1721   Coping/Psychosocial   Plan of Care Reviewed With patient   Plan of Care Review   Progress improving   OTHER   Outcome Summary Pt c/o pain, but no soa. Ambulated in hallway. Pain controlled with ERAS, PRN valium given x1. Tolerating GI soft diet. Ostomy output thickening up. K Phos ordered again today. PRN pyridium ordered, but none requested yet. Sleeping on and off this afternoon. Family at bedside.      Goal: Individualization and Mutuality  Outcome: Ongoing (interventions implemented as appropriate)    Goal: Discharge Needs Assessment  Outcome: Ongoing (interventions implemented as appropriate)    Goal: Interprofessional Rounds/Family Conf  Outcome: Ongoing (interventions implemented as appropriate)      Problem: Surgery Nonspecified (Adult)  Goal: Signs and Symptoms of Listed Potential Problems Will be Absent, Minimized or Managed (Surgery Nonspecified)  Outcome: Ongoing (interventions implemented as appropriate)      Problem: Pain, Acute (Adult)  Goal: Acceptable Pain Control/Comfort Level  Outcome: Ongoing (interventions implemented as appropriate)      Problem: Ileostomy (Adult)  Goal: Signs and Symptoms of Listed Potential Problems Will be Absent, Minimized or Managed (Ileostomy)  Outcome: Ongoing (interventions implemented as appropriate)

## 2019-12-05 NOTE — PLAN OF CARE
Problem: Patient Care Overview  Goal: Plan of Care Review  Outcome: Ongoing (interventions implemented as appropriate)   12/05/19 5288   Coping/Psychosocial   Plan of Care Reviewed With patient   Plan of Care Review   Progress no change   OTHER   Outcome Summary CWOCN follow up- patient does not remember pouch change from yesterday. Sanam noted that she slept through it. Today she was very drowsy again. She would wake up and talk with me enthusiastically then gradually doze off. I emptied it and she kind of observed. I reiterated emptying with her. Will need to continue with teaching. Hopefully tomorrow she will be able to stay awake better. She did say she has been up walking and ate a little today.       Problem: Ileostomy (Adult)  Goal: Signs and Symptoms of Listed Potential Problems Will be Absent, Minimized or Managed (Ileostomy)  Outcome: Ongoing (interventions implemented as appropriate)   12/05/19 8365   Goal/Outcome Evaluation   Problems Assessed (Ileostomy) all   Problems Present (Ileostomy) situational response

## 2019-12-06 LAB
ALBUMIN SERPL-MCNC: 2.6 G/DL (ref 3.5–5.2)
ANION GAP SERPL CALCULATED.3IONS-SCNC: 10.3 MMOL/L (ref 5–15)
APTT PPP: 29.8 SECONDS (ref 22.7–35.4)
BASOPHILS # BLD AUTO: 0.02 10*3/MM3 (ref 0–0.2)
BASOPHILS NFR BLD AUTO: 0.3 % (ref 0–1.5)
BUN BLD-MCNC: 7 MG/DL (ref 6–20)
BUN/CREAT SERPL: 14.6 (ref 7–25)
CALCIUM SPEC-SCNC: 8 MG/DL (ref 8.6–10.5)
CHLORIDE SERPL-SCNC: 110 MMOL/L (ref 98–107)
CO2 SERPL-SCNC: 21.7 MMOL/L (ref 22–29)
CREAT BLD-MCNC: 0.48 MG/DL (ref 0.57–1)
DEPRECATED RDW RBC AUTO: 45.3 FL (ref 37–54)
EOSINOPHIL # BLD AUTO: 0.38 10*3/MM3 (ref 0–0.4)
EOSINOPHIL NFR BLD AUTO: 5 % (ref 0.3–6.2)
ERYTHROCYTE [DISTWIDTH] IN BLOOD BY AUTOMATED COUNT: 13.6 % (ref 12.3–15.4)
GFR SERPL CREATININE-BSD FRML MDRD: 143 ML/MIN/1.73
GLUCOSE BLD-MCNC: 98 MG/DL (ref 65–99)
HCT VFR BLD AUTO: 27.7 % (ref 34–46.6)
HGB BLD-MCNC: 9.2 G/DL (ref 12–15.9)
IMM GRANULOCYTES # BLD AUTO: 0.06 10*3/MM3 (ref 0–0.05)
IMM GRANULOCYTES NFR BLD AUTO: 0.8 % (ref 0–0.5)
LYMPHOCYTES # BLD AUTO: 0.95 10*3/MM3 (ref 0.7–3.1)
LYMPHOCYTES NFR BLD AUTO: 12.5 % (ref 19.6–45.3)
MCH RBC QN AUTO: 30.2 PG (ref 26.6–33)
MCHC RBC AUTO-ENTMCNC: 33.2 G/DL (ref 31.5–35.7)
MCV RBC AUTO: 90.8 FL (ref 79–97)
MONOCYTES # BLD AUTO: 0.52 10*3/MM3 (ref 0.1–0.9)
MONOCYTES NFR BLD AUTO: 6.8 % (ref 5–12)
NEUTROPHILS # BLD AUTO: 5.67 10*3/MM3 (ref 1.7–7)
NEUTROPHILS NFR BLD AUTO: 74.6 % (ref 42.7–76)
NRBC BLD AUTO-RTO: 0.1 /100 WBC (ref 0–0.2)
PHOSPHATE SERPL-MCNC: 3.7 MG/DL (ref 2.5–4.5)
PLATELET # BLD AUTO: 228 10*3/MM3 (ref 140–450)
PMV BLD AUTO: 8.7 FL (ref 6–12)
POTASSIUM BLD-SCNC: 3.9 MMOL/L (ref 3.5–5.2)
RBC # BLD AUTO: 3.05 10*6/MM3 (ref 3.77–5.28)
SODIUM BLD-SCNC: 142 MMOL/L (ref 136–145)
WBC NRBC COR # BLD: 7.6 10*3/MM3 (ref 3.4–10.8)

## 2019-12-06 PROCEDURE — 85730 THROMBOPLASTIN TIME PARTIAL: CPT | Performed by: COLON & RECTAL SURGERY

## 2019-12-06 PROCEDURE — 85025 COMPLETE CBC W/AUTO DIFF WBC: CPT | Performed by: PHYSICIAN ASSISTANT

## 2019-12-06 PROCEDURE — 25010000003 AMPICILLIN-SULBACTAM PER 1.5 G: Performed by: COLON & RECTAL SURGERY

## 2019-12-06 PROCEDURE — 25010000002 METHOCARBAMOL 1000 MG/10ML SOLUTION 10 ML VIAL: Performed by: COLON & RECTAL SURGERY

## 2019-12-06 PROCEDURE — 80069 RENAL FUNCTION PANEL: CPT | Performed by: PHYSICIAN ASSISTANT

## 2019-12-06 PROCEDURE — 25010000002 HYDROMORPHONE 1 MG/ML SOLUTION: Performed by: COLON & RECTAL SURGERY

## 2019-12-06 PROCEDURE — 25010000002 HYDROMORPHONE PER 4 MG: Performed by: COLON & RECTAL SURGERY

## 2019-12-06 RX ORDER — CALCIUM POLYCARBOPHIL 625 MG 625 MG/1
1250 TABLET ORAL 2 TIMES DAILY
Status: DISCONTINUED | OUTPATIENT
Start: 2019-12-06 | End: 2019-12-07 | Stop reason: HOSPADM

## 2019-12-06 RX ADMIN — RIVAROXABAN 10 MG: 10 TABLET, FILM COATED ORAL at 11:38

## 2019-12-06 RX ADMIN — GABAPENTIN 300 MG: 300 CAPSULE ORAL at 08:47

## 2019-12-06 RX ADMIN — CALCIUM POLYCARBOPHIL 1250 MG: 625 TABLET, FILM COATED ORAL at 09:56

## 2019-12-06 RX ADMIN — AMPICILLIN SODIUM AND SULBACTAM SODIUM 3 G: 2; 1 INJECTION, POWDER, FOR SOLUTION INTRAMUSCULAR; INTRAVENOUS at 13:23

## 2019-12-06 RX ADMIN — ACETAMINOPHEN 1000 MG: 500 TABLET, FILM COATED ORAL at 08:47

## 2019-12-06 RX ADMIN — ESCITALOPRAM OXALATE 20 MG: 20 TABLET, FILM COATED ORAL at 08:47

## 2019-12-06 RX ADMIN — METOPROLOL TARTRATE 12.5 MG: 25 TABLET ORAL at 20:33

## 2019-12-06 RX ADMIN — METOPROLOL TARTRATE 5 MG: 5 INJECTION, SOLUTION INTRAVENOUS at 04:16

## 2019-12-06 RX ADMIN — CALCIUM POLYCARBOPHIL 1250 MG: 625 TABLET, FILM COATED ORAL at 20:33

## 2019-12-06 RX ADMIN — ACETAMINOPHEN 1000 MG: 500 TABLET, FILM COATED ORAL at 13:23

## 2019-12-06 RX ADMIN — HYDROMORPHONE HYDROCHLORIDE 0.25 MG: 10 INJECTION INTRAMUSCULAR; INTRAVENOUS; SUBCUTANEOUS at 00:20

## 2019-12-06 RX ADMIN — GABAPENTIN 300 MG: 300 CAPSULE ORAL at 20:34

## 2019-12-06 RX ADMIN — HYDROMORPHONE HYDROCHLORIDE 0.25 MG: 10 INJECTION INTRAMUSCULAR; INTRAVENOUS; SUBCUTANEOUS at 19:26

## 2019-12-06 RX ADMIN — CELECOXIB 200 MG: 200 CAPSULE ORAL at 08:47

## 2019-12-06 RX ADMIN — AMPICILLIN SODIUM AND SULBACTAM SODIUM 3 G: 2; 1 INJECTION, POWDER, FOR SOLUTION INTRAMUSCULAR; INTRAVENOUS at 05:55

## 2019-12-06 RX ADMIN — CELECOXIB 200 MG: 200 CAPSULE ORAL at 20:33

## 2019-12-06 RX ADMIN — HYDROMORPHONE HYDROCHLORIDE 0.25 MG: 10 INJECTION INTRAMUSCULAR; INTRAVENOUS; SUBCUTANEOUS at 09:59

## 2019-12-06 RX ADMIN — AMPICILLIN SODIUM AND SULBACTAM SODIUM 3 G: 2; 1 INJECTION, POWDER, FOR SOLUTION INTRAMUSCULAR; INTRAVENOUS at 22:37

## 2019-12-06 RX ADMIN — POTASSIUM CHLORIDE, DEXTROSE MONOHYDRATE AND SODIUM CHLORIDE 50 ML/HR: 150; 5; 450 INJECTION, SOLUTION INTRAVENOUS at 00:20

## 2019-12-06 RX ADMIN — HYDROMORPHONE HYDROCHLORIDE 0.25 MG: 10 INJECTION INTRAMUSCULAR; INTRAVENOUS; SUBCUTANEOUS at 23:23

## 2019-12-06 RX ADMIN — FAMOTIDINE 20 MG: 10 INJECTION INTRAVENOUS at 20:33

## 2019-12-06 RX ADMIN — FAMOTIDINE 20 MG: 10 INJECTION INTRAVENOUS at 08:47

## 2019-12-06 RX ADMIN — ACETAMINOPHEN 1000 MG: 500 TABLET, FILM COATED ORAL at 02:19

## 2019-12-06 RX ADMIN — ACETAMINOPHEN 1000 MG: 500 TABLET, FILM COATED ORAL at 20:33

## 2019-12-06 RX ADMIN — METHOCARBAMOL 1000 MG: 100 INJECTION, SOLUTION INTRAMUSCULAR; INTRAVENOUS at 23:34

## 2019-12-06 NOTE — PROGRESS NOTES
Progress Note    POD4 s/p laparoscopic low anterior colon resection with mobilization of splenic flexure and diverting loop ileostomy    S: positive flatus,  positive BM. positive ambulating. Pain controlled with ERAS protocol scheduled Tylenol, Celebrex, and gabapentin, IV robaxin, as well as IV dilaudid for breakthrough pain     She is tolerating a soft diet well with no nausea or vomiting     No CP or SOB    O:  Temp:  [96.8 °F (36 °C)-98.9 °F (37.2 °C)] 98 °F (36.7 °C)  Heart Rate:  [79-93] 79  Resp:  [16-18] 16  BP: (129-142)/(70-87) 134/83      Intake/Output Summary (Last 24 hours) at 12/6/2019 1240  Last data filed at 12/6/2019 0949  Gross per 24 hour   Intake 1000 ml   Output 3330 ml   Net -2330 ml       Abd: soft, appropriately tender, non-distended  Incision: clean, dry, intact  Stoma: pink      Results from last 7 days   Lab Units 12/06/19  0313 12/05/19  0511 12/04/19  0747 12/04/19  0600   WBC 10*3/mm3 7.60 7.97  --  9.20   HEMOGLOBIN g/dL 9.2* 9.4* 7.5* 7.7*   HEMATOCRIT % 27.7* 28.6* 22.8* 22.7*   PLATELETS 10*3/mm3 228 207  --  216     Results from last 7 days   Lab Units 12/06/19  0313 12/05/19  0511 12/04/19  0600   SODIUM mmol/L 142 142 140   POTASSIUM mmol/L 3.9 4.0 4.1   CHLORIDE mmol/L 110* 109* 106   CO2 mmol/L 21.7* 25.0 24.3   BUN mg/dL 7 7 11   CREATININE mg/dL 0.48* 0.60 0.58   GLUCOSE mg/dL 98 97 107*   CALCIUM mg/dL 8.0* 7.5* 7.9*   PHOSPHORUS mg/dL 3.7 2.3* 1.8*       A/P: 39 y.o. female POD4 s/p laparoscopic low anterior colon resection with mobilization of splenic flexure and diverting loop ileostomy    -afebrile, VSS  -H/H stable  -she is tolerating soft diet  -po metoprolol  -increase ambulation     Scribed for Padmaja Ye MD by aPrul Rios PA-C. 12/6/2019  12:41 PM   This patient was evaluated by me, recommendations made, documentation reviewed, edited, and revised by me, Padmaja Ye MD

## 2019-12-06 NOTE — PAYOR COMM NOTE
"Carole Weaver (39 y.o. Female)     ATTN: NILDA    REF#[id number]  PLEASE CALL BACK TO HUONG [phone] OR [phone]  THANKS!   HUONG      Date of Birth Social Security Number Address Home Phone MRN    [id number]  [id number] [facility] [address] [phone] [id number]    Worship Marital Status          Denominational        Admission Date Admission Type Admitting Provider Attending Provider Department, Room/Bed    12/2/19 Elective Padmaja Ye MD Allen, Nechol L, MD [address], [address]/1    Discharge Date Discharge Disposition Discharge Destination                       Attending Provider:  Padmaja Ye MD    Allergies:  No Known Allergies    Isolation:  None   Infection:  None   Code Status:  CPR    Ht:  167.6 cm (66\")   Wt:  102 kg (224 lb 9.6 oz)    Admission Cmt:  None   Principal Problem:  Rectal cancer (CMS/HCC) [C20] More...                 Active Insurance as of 12/2/2019     Primary Coverage     Payor Plan Insurance Group Employer/Plan Group    [employer] BLUE [employer] PPO [id number]     Payor Plan Address Payor Plan Phone Number Payor Plan Fax Number Effective Dates    [phone]  1/1/2018 - None Entered    [facility] [id number]       Subscriber Name Subscriber Birth Date Member ID       CAROLE WEAVER 1980 [id number]                 Emergency Contacts      (Rel.) Home Phone Work Phone Mobile Phone    Justus Weaver (Spouse) [phone] -- --    ROSEANNEDENIS BARBOSA (Mother) -- -- [phone]            Vital Signs (last day)     Date/Time   Temp   Temp src   Pulse   Resp   BP   Patient Position   SpO2    12/06/19 1300   98.1 (36.7)   Oral   91   16   146/76   Lying   96    12/06/19 0733   98 (36.7)   Oral   79   16   134/83   Lying   93    12/05/19 2243   98.9 (37.2)   Oral   91   16   129/70   Lying   95    12/05/19 1900   96.8 (36)   Oral   93   16   138/84   Lying   96    12/05/19 1629   " --   --   90   --   142/87   Lying   --    12/05/19 1312   98.3 (36.8)   Oral   91   18   137/79   Sitting   94    12/05/19 0722   98.8 (37.1)   Oral   91   16   127/77   Lying   93              Intake & Output (last day)       12/05 0701 - 12/06 0700 12/06 0701 - 12/07 0700    P.O. 840     Blood      IV Piggyback 400     Total Intake(mL/kg) 1240 (12.2)     Urine (mL/kg/hr) 1790 (0.7) 500 (0.7)    Drains 185 30    Stool 1550 150    Total Output 1205 680    Net -7113 -147              [facility] Medications (active)       Dose Frequency Start End    acetaminophen (TYLENOL) tablet 1,000 mg 1,000 mg Every 6 Hours 12/2/2019     Sig - Route: Take 2 tablets by mouth Every 6 (Six) Hours. - Oral    ampicillin-sulbactam (UNASYN) 3 g in sodium chloride 0.9 % 100 mL IVPB-MBP 3 g Every 8 Hours 12/2/2019 12/7/2019    Sig - Route: Infuse 3 g into a venous catheter Every 8 (Eight) Hours. - Intravenous    calcium polycarbophil (FIBERCON) tablet 1,250 mg 1,250 mg 2 Times Daily 12/6/2019     Sig - Route: Take 2 tablets by mouth 2 (Two) Times a Day. - Oral    Cosign for Ordering: Accepted by Padmaja Ye MD on 12/6/2019  8:18 AM    celecoxib (CeleBREX) capsule 200 mg 200 mg Every 12 Hours Scheduled 12/5/2019 12/8/2019    Sig - Route: Take 1 capsule by mouth Every 12 (Twelve) Hours. - Oral    Cosign for Ordering: Accepted by Padmaja Ye MD on 12/5/2019  2:06 PM    dextrose 5 % and sodium chloride 0.45 % with KCl 20 mEq/L infusion 50 mL/hr Continuous 12/4/2019     Sig - Route: Infuse 50 mL/hr into a venous catheter Continuous. - Intravenous    Cosign for Ordering: Accepted by Padmaja Ye MD on 12/4/2019 10:15 AM    diazePAM (VALIUM) tablet 4 mg 4 mg Every 6 Hours PRN 12/3/2019 12/13/2019    Sig - Route: Take 2 tablets by mouth Every 6 (Six) Hours As Needed (walk). - Oral    Cosign for Ordering: Accepted by Padmaja Ye MD on 12/3/2019  4:12 PM    escitalopram (LEXAPRO) tablet 20 mg 20 mg Daily 12/3/2019     Sig - Route:  "Take 1 tablet by mouth Daily. - Oral    famotidine (PEPCID) injection 20 mg 20 mg Every 12 Hours Scheduled 12/2/2019     Sig - Route: Infuse 2 mL into a venous catheter Every 12 (Twelve) Hours. - Intravenous    gabapentin (NEURONTIN) capsule 300 mg 300 mg Every 12 Hours Scheduled 12/5/2019     Sig - Route: Take 1 capsule by mouth Every 12 (Twelve) Hours. - Oral    HYDROmorphone (DILAUDID) injection 0.25 mg 0.25 mg Every 3 Hours PRN 12/2/2019 12/12/2019    Sig - Route: Infuse 0.25 mL into a venous catheter Every 3 (Three) Hours As Needed for Severe Pain . - Intravenous    Linked Group 1:  \"And\" Linked Group Details        Methocarbamol (ROBAXIN) 1,000 mg in sodium chloride 0.9 % 100 mL IVPB 1,000 mg Every 8 Hours PRN 12/3/2019 12/9/2019    Sig - Route: Infuse 1,000 mg into a venous catheter Every 8 (Eight) Hours As Needed (muscle spasm). - Intravenous    Cosign for Ordering: Accepted by Padmaja Ye MD on 12/3/2019  8:54 AM    metoprolol tartrate (LOPRESSOR) tablet 12.5 mg 12.5 mg Every 12 Hours Scheduled 12/6/2019     Sig - Route: Take 0.5 tablets by mouth Every 12 (Twelve) Hours. - Oral    Cosign for Ordering: Required by Padmaja Ye MD    naloxone (NARCAN) injection 0.4 mg 0.4 mg Every 5 Minutes PRN 12/2/2019     Sig - Route: Infuse 1 mL into a venous catheter Every 5 (Five) Minutes As Needed for Respiratory Depression. - Intravenous    Linked Group 1:  \"And\" Linked Group Details        nitroglycerin (NITROSTAT) SL tablet 0.4 mg 0.4 mg Every 5 Minutes PRN 12/2/2019     Sig - Route: Place 1 tablet under the tongue Every 5 (Five) Minutes As Needed for Chest Pain (if systolic BP greater than 100 mm/Hg.). - Sublingual    ondansetron (ZOFRAN) injection 4 mg 4 mg Every 6 Hours PRN 12/2/2019     Sig - Route: Infuse 2 mL into a venous catheter Every 6 (Six) Hours As Needed for Nausea or Vomiting. - Intravenous    phenazopyridine (PYRIDIUM) tablet 100 mg 100 mg 3 Times Daily PRN 12/5/2019     Sig - Route: Take 1 " tablet by mouth 3 (Three) Times a Day As Needed for Bladder Spasms. - Oral    Cosign for Ordering: Accepted by Padmaja Ye MD on 12/5/2019  2:06 PM    phosphorus (K PHOS NEUTRAL) tablet 2 tablet 500 mg 2 Times Daily 12/5/2019 12/5/2019    Sig - Route: Take 2 tablets by mouth 2 (Two) Times a Day. - Oral    Cosign for Ordering: Accepted by Padmaja Ye MD on 12/5/2019 10:58 AM    rivaroxaban (XARELTO) tablet 10 mg 10 mg Daily 12/6/2019     Sig - Route: Take 1 tablet by mouth Daily. - Oral    metoprolol tartrate (LOPRESSOR) injection 5 mg (Discontinued) 5 mg Every 6 Hours 12/3/2019 12/6/2019    Sig - Route: Infuse 5 mL into a venous catheter Every 6 (Six) Hours. - Intravenous    Cosign for Ordering: Accepted by Padmaja Ye MD on 12/3/2019  4:12 PM             Physician Progress Notes (last 24 hours) (Notes from 12/05/19 1344 through 12/06/19 1344)      Parul Rios PA-C at 12/06/19 1240          Progress Note    POD4 s/p laparoscopic low anterior colon resection with mobilization of splenic flexure and diverting loop ileostomy    S: positive flatus,  positive BM. positive ambulating. Pain controlled with ERAS protocol scheduled Tylenol, Celebrex, and gabapentin, IV robaxin, as well as IV dilaudid for breakthrough pain     She is tolerating a soft diet well with no nausea or vomiting     No CP or SOB    O:  Temp:  [96.8 °F (36 °C)-98.9 °F (37.2 °C)] 98 °F (36.7 °C)  Heart Rate:  [79-93] 79  Resp:  [16-18] 16  BP: (129-142)/(70-87) 134/83      Intake/Output Summary (Last 24 hours) at 12/6/2019 1240  Last data filed at 12/6/2019 0949  Gross per 24 hour   Intake 1000 ml   Output 3330 ml   Net -2330 ml       Abd: soft, appropriately tender, non-distended  Incision: clean, dry, intact  Stoma: pink      Results from last 7 days   Lab Units 12/06/19  0313 12/05/19  0511 12/04/19  0747 12/04/19  0600   WBC 10*3/mm3 7.60 7.97  --  9.20   HEMOGLOBIN g/dL 9.2* 9.4* 7.5* 7.7*   HEMATOCRIT % 27.7* 28.6* 22.8* 22.7*    PLATELETS 10*3/mm3 228 207  --  216     Results from last 7 days   Lab Units 12/06/19  0313 12/05/19  0511 12/04/19  0600   SODIUM mmol/L 142 142 140   POTASSIUM mmol/L 3.9 4.0 4.1   CHLORIDE mmol/L 110* 109* 106   CO2 mmol/L 21.7* 25.0 24.3   BUN mg/dL 7 7 11   CREATININE mg/dL 0.48* 0.60 0.58   GLUCOSE mg/dL 98 97 107*   CALCIUM mg/dL 8.0* 7.5* 7.9*   PHOSPHORUS mg/dL 3.7 2.3* 1.8*       A/P: 39 y.o. female POD4 s/p laparoscopic low anterior colon resection with mobilization of splenic flexure and diverting loop ileostomy    -afebrile, VSS  -H/H stable  -she is tolerating soft diet  -po metoprolol  -increase ambulation     Scribed for Padmaja Ye MD by Parul Rios PA-C. 12/6/2019  12:41 PM    Electronically signed by Parul Rios PA-C at 12/06/19 1322     Parul Rios PA-C at 12/06/19 0804          Progress Note    POD4 s/p laparoscopic low anterior colon resection with mobilization of splenic flexure and diverting loop ileostomy    S: positive flatus,  positive BM. positive ambulating. Pain managed with ERAS protocol scheduled Tylenol, Celebrex, and gabapentin, IV robaxin, as well as IV dilaudid for breakthrough pain    She is tolerating a soft diet well with no nausea or vomiting     No CP or SOB    O:  Temp:  [96.8 °F (36 °C)-98.9 °F (37.2 °C)] 98 °F (36.7 °C)  Heart Rate:  [79-93] 79  Resp:  [16-18] 16  BP: (129-142)/(70-87) 134/83      Intake/Output Summary (Last 24 hours) at 12/6/2019 0804  Last data filed at 12/6/2019 0555  Gross per 24 hour   Intake 1240 ml   Output 3525 ml   Net -2285 ml   ileo: 1550cc dark brown liquid output  UOP: 1790cc  MAGAN: 185cc sanguinous      Abd: soft, appropriately tender, non-distended  Incision: clean, dry, intact  Stoma: pink      Results from last 7 days   Lab Units 12/06/19  0313 12/05/19  0511 12/04/19  0747 12/04/19  0600   WBC 10*3/mm3 7.60 7.97  --  9.20   HEMOGLOBIN g/dL 9.2* 9.4* 7.5* 7.7*   HEMATOCRIT % 27.7* 28.6* 22.8* 22.7*   PLATELETS 10*3/mm3 228  207  --  216     Results from last 7 days   Lab Units 12/06/19  0313 12/05/19  0511 12/04/19  0600   SODIUM mmol/L 142 142 140   POTASSIUM mmol/L 3.9 4.0 4.1   CHLORIDE mmol/L 110* 109* 106   CO2 mmol/L 21.7* 25.0 24.3   BUN mg/dL 7 7 11   CREATININE mg/dL 0.48* 0.60 0.58   GLUCOSE mg/dL 98 97 107*   CALCIUM mg/dL 8.0* 7.5* 7.9*   PHOSPHORUS mg/dL 3.7 2.3* 1.8*       A/P: 39 y.o. female POD4 s/p laparoscopic low anterior colon resection with mobilization of splenic flexure and diverting loop ileostomy    -afebrile, VSS  -H/H stable  -Dr. Ye restarted Xarelto  -begin fiber tabs for high-output ileostomy  -increase ambulation     Parul Rios PA-C  8:04 AM    Electronically signed by Praul Rios PA-C at 12/06/19 0931       Consult Notes (last 24 hours) (Notes from 12/05/19 1344 through 12/06/19 1344)     No notes of this type exist for this encounter.

## 2019-12-06 NOTE — PLAN OF CARE
Problem: Patient Care Overview  Goal: Plan of Care Review  Outcome: Ongoing (interventions implemented as appropriate)   12/06/19 0635   Coping/Psychosocial   Plan of Care Reviewed With patient   OTHER   Outcome Summary VSS on RA. 1 dose PRN robaxin and 1 dose dilaudid given in addition to ERAS. walked 2 laps. 800 UO, 120 MAGAN, 725 from ostomy this shift.      Goal: Individualization and Mutuality  Outcome: Ongoing (interventions implemented as appropriate)    Goal: Discharge Needs Assessment  Outcome: Ongoing (interventions implemented as appropriate)    Goal: Interprofessional Rounds/Family Conf  Outcome: Ongoing (interventions implemented as appropriate)      Problem: Surgery Nonspecified (Adult)  Goal: Signs and Symptoms of Listed Potential Problems Will be Absent, Minimized or Managed (Surgery Nonspecified)  Outcome: Ongoing (interventions implemented as appropriate)      Problem: Pain, Acute (Adult)  Goal: Acceptable Pain Control/Comfort Level  Outcome: Ongoing (interventions implemented as appropriate)

## 2019-12-06 NOTE — PLAN OF CARE
Problem: Patient Care Overview  Goal: Plan of Care Review  Outcome: Ongoing (interventions implemented as appropriate)   12/06/19 1243   Coping/Psychosocial   Plan of Care Reviewed With patient;family   Plan of Care Review   Progress improving   OTHER   Outcome Summary ALCON- worked with patient on pouch change. She participated in each part of care and did very well. She is very receptive to teaching. Will request her samples from [facility]. Explained emptying, pouch change, crusting, use of belt. She verbalized understanding of all. She has our number and will have home health.        Problem: Ileostomy (Adult)  Goal: Signs and Symptoms of Listed Potential Problems Will be Absent, Minimized or Managed (Ileostomy)  Outcome: Ongoing (interventions implemented as appropriate)   12/06/19 1243   Goal/Outcome Evaluation   Problems Assessed (Ileostomy) all   Problems Present (Ileostomy) situational response

## 2019-12-07 VITALS
HEART RATE: 81 BPM | DIASTOLIC BLOOD PRESSURE: 84 MMHG | RESPIRATION RATE: 18 BRPM | OXYGEN SATURATION: 96 % | BODY MASS INDEX: 36.1 KG/M2 | WEIGHT: 224.6 LBS | TEMPERATURE: 98.5 F | SYSTOLIC BLOOD PRESSURE: 138 MMHG | HEIGHT: 66 IN

## 2019-12-07 LAB
ALBUMIN SERPL-MCNC: 2.4 G/DL (ref 3.5–5.2)
ANION GAP SERPL CALCULATED.3IONS-SCNC: 11.4 MMOL/L (ref 5–15)
APTT PPP: 29.4 SECONDS (ref 22.7–35.4)
BASOPHILS # BLD AUTO: 0.02 10*3/MM3 (ref 0–0.2)
BASOPHILS NFR BLD AUTO: 0.3 % (ref 0–1.5)
BUN BLD-MCNC: 6 MG/DL (ref 6–20)
BUN/CREAT SERPL: 12.5 (ref 7–25)
CALCIUM SPEC-SCNC: 8 MG/DL (ref 8.6–10.5)
CHLORIDE SERPL-SCNC: 104 MMOL/L (ref 98–107)
CO2 SERPL-SCNC: 22.6 MMOL/L (ref 22–29)
CREAT BLD-MCNC: 0.48 MG/DL (ref 0.57–1)
DEPRECATED RDW RBC AUTO: 45.2 FL (ref 37–54)
EOSINOPHIL # BLD AUTO: 0.4 10*3/MM3 (ref 0–0.4)
EOSINOPHIL NFR BLD AUTO: 6.1 % (ref 0.3–6.2)
ERYTHROCYTE [DISTWIDTH] IN BLOOD BY AUTOMATED COUNT: 13.4 % (ref 12.3–15.4)
GFR SERPL CREATININE-BSD FRML MDRD: 143 ML/MIN/1.73
GLUCOSE BLD-MCNC: 94 MG/DL (ref 65–99)
HCT VFR BLD AUTO: 29.1 % (ref 34–46.6)
HGB BLD-MCNC: 9.4 G/DL (ref 12–15.9)
IMM GRANULOCYTES # BLD AUTO: 0.06 10*3/MM3 (ref 0–0.05)
IMM GRANULOCYTES NFR BLD AUTO: 0.9 % (ref 0–0.5)
LYMPHOCYTES # BLD AUTO: 0.77 10*3/MM3 (ref 0.7–3.1)
LYMPHOCYTES NFR BLD AUTO: 11.7 % (ref 19.6–45.3)
MAGNESIUM SERPL-MCNC: 2 MG/DL (ref 1.6–2.6)
MCH RBC QN AUTO: 29.6 PG (ref 26.6–33)
MCHC RBC AUTO-ENTMCNC: 32.3 G/DL (ref 31.5–35.7)
MCV RBC AUTO: 91.5 FL (ref 79–97)
MONOCYTES # BLD AUTO: 0.65 10*3/MM3 (ref 0.1–0.9)
MONOCYTES NFR BLD AUTO: 9.9 % (ref 5–12)
NEUTROPHILS # BLD AUTO: 4.68 10*3/MM3 (ref 1.7–7)
NEUTROPHILS NFR BLD AUTO: 71.1 % (ref 42.7–76)
NRBC BLD AUTO-RTO: 0 /100 WBC (ref 0–0.2)
PHOSPHATE SERPL-MCNC: 4 MG/DL (ref 2.5–4.5)
PLATELET # BLD AUTO: 262 10*3/MM3 (ref 140–450)
PMV BLD AUTO: 8.8 FL (ref 6–12)
POTASSIUM BLD-SCNC: 3.6 MMOL/L (ref 3.5–5.2)
RBC # BLD AUTO: 3.18 10*6/MM3 (ref 3.77–5.28)
SODIUM BLD-SCNC: 138 MMOL/L (ref 136–145)
WBC NRBC COR # BLD: 6.58 10*3/MM3 (ref 3.4–10.8)

## 2019-12-07 PROCEDURE — 83735 ASSAY OF MAGNESIUM: CPT | Performed by: PHYSICIAN ASSISTANT

## 2019-12-07 PROCEDURE — 25010000002 HYDROMORPHONE PER 4 MG: Performed by: COLON & RECTAL SURGERY

## 2019-12-07 PROCEDURE — 80069 RENAL FUNCTION PANEL: CPT | Performed by: PHYSICIAN ASSISTANT

## 2019-12-07 PROCEDURE — 85730 THROMBOPLASTIN TIME PARTIAL: CPT | Performed by: COLON & RECTAL SURGERY

## 2019-12-07 PROCEDURE — 25010000003 AMPICILLIN-SULBACTAM PER 1.5 G: Performed by: COLON & RECTAL SURGERY

## 2019-12-07 PROCEDURE — 85025 COMPLETE CBC W/AUTO DIFF WBC: CPT | Performed by: PHYSICIAN ASSISTANT

## 2019-12-07 RX ORDER — CALCIUM POLYCARBOPHIL 625 MG 625 MG/1
1250 TABLET ORAL 2 TIMES DAILY
Qty: 60 TABLET | Refills: 7 | Status: SHIPPED | OUTPATIENT
Start: 2019-12-07 | End: 2020-01-22

## 2019-12-07 RX ORDER — PHENAZOPYRIDINE HYDROCHLORIDE 100 MG/1
100 TABLET, FILM COATED ORAL 3 TIMES DAILY PRN
Qty: 120 TABLET | Refills: 1 | Status: ON HOLD | OUTPATIENT
Start: 2019-12-07 | End: 2020-04-25

## 2019-12-07 RX ADMIN — ACETAMINOPHEN 1000 MG: 500 TABLET, FILM COATED ORAL at 02:15

## 2019-12-07 RX ADMIN — AMPICILLIN SODIUM AND SULBACTAM SODIUM 3 G: 2; 1 INJECTION, POWDER, FOR SOLUTION INTRAMUSCULAR; INTRAVENOUS at 06:16

## 2019-12-07 RX ADMIN — METOPROLOL TARTRATE 12.5 MG: 25 TABLET ORAL at 08:34

## 2019-12-07 RX ADMIN — ACETAMINOPHEN 1000 MG: 500 TABLET, FILM COATED ORAL at 14:15

## 2019-12-07 RX ADMIN — RIVAROXABAN 10 MG: 10 TABLET, FILM COATED ORAL at 08:33

## 2019-12-07 RX ADMIN — CELECOXIB 200 MG: 200 CAPSULE ORAL at 08:33

## 2019-12-07 RX ADMIN — HYDROMORPHONE HYDROCHLORIDE 0.25 MG: 10 INJECTION INTRAMUSCULAR; INTRAVENOUS; SUBCUTANEOUS at 05:13

## 2019-12-07 RX ADMIN — ESCITALOPRAM OXALATE 20 MG: 20 TABLET, FILM COATED ORAL at 08:33

## 2019-12-07 RX ADMIN — ACETAMINOPHEN 1000 MG: 500 TABLET, FILM COATED ORAL at 08:34

## 2019-12-07 RX ADMIN — GABAPENTIN 300 MG: 300 CAPSULE ORAL at 08:34

## 2019-12-07 RX ADMIN — FAMOTIDINE 20 MG: 10 INJECTION INTRAVENOUS at 08:34

## 2019-12-07 RX ADMIN — CALCIUM POLYCARBOPHIL 1250 MG: 625 TABLET, FILM COATED ORAL at 08:33

## 2019-12-07 NOTE — PROGRESS NOTES
Progress Note    Pod 5      S: positive flatus,  positive BM. positive ambulating. Pain controlled with po med    O:  Temp:  [98.3 °F (36.8 °C)-98.6 °F (37 °C)] 98.5 °F (36.9 °C)  Heart Rate:  [79-92] 81  Resp:  [16-18] 18  BP: (132-152)/(84-91) 138/84      Intake/Output Summary (Last 24 hours) at 12/7/2019 1344  Last data filed at 12/7/2019 1239  Gross per 24 hour   Intake 840 ml   Output 3435 ml   Net -2595 ml       Abd:   soft, non distended  Incision: clean, dry  jillian sang    Results from last 7 days   Lab Units 12/07/19  0412   WBC 10*3/mm3 6.58   HEMOGLOBIN g/dL 9.4*   HEMATOCRIT % 29.1*   PLATELETS 10*3/mm3 262     Results from last 7 days   Lab Units 12/07/19  0412   SODIUM mmol/L 138   POTASSIUM mmol/L 3.6   CHLORIDE mmol/L 104   CO2 mmol/L 22.6   BUN mg/dL 6   CREATININE mg/dL 0.48*   GLUCOSE mg/dL 94   CALCIUM mg/dL 8.0*   PHOSPHORUS mg/dL 4.0       Results from last 7 days   Lab Units 12/07/19  0412   MAGNESIUM mg/dL 2.0         A/P: 39 y.o. female with s/p laparoscopic low anterior colon resection with mobilization of splenic flexure and diverting loop ileostomy: ERAS, * Panel 2 does not exist *  D/c home f/u  tues

## 2019-12-07 NOTE — PLAN OF CARE
Problem: Patient Care Overview  Goal: Plan of Care Review  Outcome: Ongoing (interventions implemented as appropriate)  Flowsheets (Taken 12/7/2019 1222)  Progress: improving  Plan of Care Reviewed With: patient  Outcome Summary: Pt stable. VSS. Pt ambulating in diggs, but needs encouragement. Family at bedside, updated on care. Good output from ostomy. Pain controlled with ERAS. WCM patient.  Goal: Individualization and Mutuality  Outcome: Ongoing (interventions implemented as appropriate)  Goal: Discharge Needs Assessment  Outcome: Ongoing (interventions implemented as appropriate)  Flowsheets (Taken 12/7/2019 1222)  Concerns to be Addressed: adjustment to diagnosis/illness  Goal: Interprofessional Rounds/Family Conf  Outcome: Ongoing (interventions implemented as appropriate)     Problem: Surgery Nonspecified (Adult)  Goal: Signs and Symptoms of Listed Potential Problems Will be Absent, Minimized or Managed (Surgery Nonspecified)  Outcome: Ongoing (interventions implemented as appropriate)  Flowsheets (Taken 12/7/2019 1222)  Problems Assessed (Surgery): all  Problems Present (Surgery): situational response; pain     Problem: Pain, Acute (Adult)  Goal: Acceptable Pain Control/Comfort Level  Outcome: Ongoing (interventions implemented as appropriate)  Flowsheets (Taken 12/7/2019 1222)  Acceptable Pain Control/Comfort Level: making progress toward outcome     Problem: Ileostomy (Adult)  Goal: Signs and Symptoms of Listed Potential Problems Will be Absent, Minimized or Managed (Ileostomy)  Outcome: Ongoing (interventions implemented as appropriate)  Flowsheets  Taken 12/7/2019 1222 by Alexandra Ro RN  Problems Assessed (Ileostomy): all  Taken 12/6/2019 1243 by Delaney Prescott RN, CWOCN  Problems Present (Ileostomy): situational response

## 2019-12-07 NOTE — PLAN OF CARE
Problem: Patient Care Overview  Goal: Plan of Care Review  Outcome: Ongoing (interventions implemented as appropriate)  Flowsheets (Taken 12/7/2019 8675)  Progress: improving  Plan of Care Reviewed With: patient; mother  Outcome Summary: VSS. Pt ambulated in diggs. Good output from ostomy and urinating well. Safety maintained. Pain controlled w scheduled and PRN meds. Will continue to monitor.  Goal: Individualization and Mutuality  Outcome: Ongoing (interventions implemented as appropriate)  Flowsheets (Taken 12/2/2019 0908 by Cherie Arriaga, RN)  Patient Specific Preferences: Carole  Goal: Discharge Needs Assessment  Outcome: Ongoing (interventions implemented as appropriate)  Flowsheets (Taken 12/3/2019 1358 by Ami Adler LCSW)  Discharge Facility/Level of Care Needs: home with home health  Equipment Needed After Discharge: wound care supplies;other (see comments) (new ostomy)  Discharge Coordination/Progress: [facility]   Equipment Currently Used at Home: none  Transportation Anticipated: family or friend will provide  Outpatient/Agency/Support Group Needs: homecare agency  Concerns to be Addressed: discharge planning  Patient/Family Anticipated Services at Transition: home health care  Patient's Choice of Community Agency(s): [facility]  Patient/Family Anticipates Transition to: home with family;home with help/services  Goal: Interprofessional Rounds/Family Conf  Outcome: Ongoing (interventions implemented as appropriate)  Flowsheets (Taken 12/6/2019 1449 by Miranda Galeas, RN)  Participants: patient     Problem: Surgery Nonspecified (Adult)  Goal: Signs and Symptoms of Listed Potential Problems Will be Absent, Minimized or Managed (Surgery Nonspecified)  Outcome: Ongoing (interventions implemented as appropriate)  Flowsheets (Taken 12/5/2019 1721 by Mireille Burnham, RN)  Problems Assessed (Surgery): all  Problems Present (Surgery): pain     Problem: Pain, Acute (Adult)  Goal: Acceptable Pain  Control/Comfort Level  Outcome: Ongoing (interventions implemented as appropriate)  Flowsheets (Taken 12/6/2019 1449 by Miranda Galeas, RN)  Acceptable Pain Control/Comfort Level: making progress toward outcome     Problem: Ileostomy (Adult)  Goal: Signs and Symptoms of Listed Potential Problems Will be Absent, Minimized or Managed (Ileostomy)  Outcome: Ongoing (interventions implemented as appropriate)  Flowsheets (Taken 12/6/2019 1243 by Delaney Prescott RN, CWOCN)  Problems Assessed (Ileostomy): all  Problems Present (Ileostomy): situational response

## 2019-12-08 ENCOUNTER — READMISSION MANAGEMENT (OUTPATIENT)
Dept: CALL CENTER | Facility: HOSPITAL | Age: 39
End: 2019-12-08

## 2019-12-09 NOTE — PROGRESS NOTES
Case Management Discharge Note      Final Note: Home with family and [facility] for new ostomy.  Transported by family    Provided post acute provider list?: Yes  Post Acute Provider Lists: Home Health  Post Acuite Provider List: Delivered  Delivered To: Patient  Method of Delivery: In person    Destination      No service has been selected for the patient.      Durable Medical Equipment      No service has been selected for the patient.      Dialysis/Infusion      No service has been selected for the patient.      Home Medical Care - Selection Complete      Service Provider Request Status Selected Services Address Phone Number Fax Number    [facility] [address] [address] [phone]      Therapy      No service has been selected for the patient.      Community Resources      No service has been selected for the patient.        Transportation Services  Private: Car    Final Discharge Disposition Code: 06 - home with home health care

## 2019-12-09 NOTE — DISCHARGE SUMMARY
Date of Admission: 2019  Date of Discharge: 2019    Presenting Problem/History of Present Illness  Rectal cancer (CMS/HCC) [C20]  Rectal cancer (CMS/HCC) [C20]     Discharge Diagnosis:   Past Medical History:   Diagnosis Date   • Abnormal Pap smear of cervix 1998    JULIUS I   • Anemia in pregnancy    • Anxiety    • Cervical lymphadenitis 2012    SEEN AT [facility] ER   • Chronic diarrhea    • Colon polyps     FOLLOWED BY DR. GERONIMO ESPARZA   • Depression    • Factor V Leiden (CMS/HCC)     DX 2019   • GERD (gastroesophageal reflux disease)    • Heart murmur     ?   • Hematochezia    • Hemorrhoids    • History of anemia    • History of chemotherapy     FOLLOWED BY DR. ALEXANDRU HUNT   • History of gestational diabetes    • History of pre-eclampsia    • History of radiation therapy     FOLLOWED BY DR. JAVON LEWIS   • History of TB skin testing 2009    TB Skin Test   • HPV in female    • IBS (irritable bowel syndrome)    • Infected insect bite of neck 2016    SEEN AT [facility]   • Irritable bowel syndrome    • Kidney stone     HX X2 , RECENT 10/19   • Kidney stones 2007    SEEN AT [facility] ER   • Lumbar disc disease    • On anticoagulant therapy    • PIH (pregnancy induced hypertension)    • Pulmonary embolism (CMS/HCC) 2019    ADMITTED TO [facility]   • Rectal cancer (CMS/HCC) 2019    INVASIVE MODERATELY DIFFERENTIATED ADENOCARCINOMA, CHEMO AND XRT FINISHED 2019   • Right leg pain    • Right ureteral stone 10/01/2019    SEEN AT [facility] ER   • SAB (spontaneous ) 1996     A2-1 INDUCED   • Sepsis due to cellulitis (CMS/HCC) 2002    LEFT GREAT TOE, ADMITTED TO [facility]       Procedures Performed  Procedure(s):  laparoscopic low anterior colon resection with mobilization of splenic flexure and diverting loop ileostomy: ERAS      Hospital Course  Patient is a 39 y.o. female presented for planned laparoscopic low anterior colon resection with diverting loop  ileostomy s/p chemoradiation.  Procedure performed without incident 12/2/2019; patient tolerated well.    Pathology: Invasive, moderately differentiated adenocarcinoma.  All margins negative.  1 of 14 lymph nodes involved.  Post-treatment staging T3N1.    Postoperative admission: Initially, patient complained of pain with ERAS protocol pain medications. IV robaxin added, and pt's pain gradually improved.  Metoprolol was added for tachycardia and hypertension.  For history of PE while on Lovenox and heterozygous for factor V Leiden, Lovenox was changed to heparin drip POD1.  Pt's H/H dropped from 11.3/33.0 to 7.5/22.8, so heparin was d/c'ed and 2 units pRBC transfused with appropriate response in H/H.  Her diet was advanced, and she began to have ileostomy function.  She was afebrile, with stable vital signs, tolerating a soft diet, with appropriate ileostomy function, no issue with bladder function, patient's pain adequately managed with by mouth pain medication, ambulating well, she was deemed stable for discharge to home POD5 with home health for new ileostomy.      Consults:   Consults     No orders found from 11/3/2019 to 12/3/2019.            Discharge Disposition  Home or Self Care    Discharge Medications     Discharge Medications      New Medications      Instructions Start Date   calcium polycarbophil 625 MG tablet  Commonly known as:  FIBERCON   1,250 mg, Oral, 2 Times Daily      metoprolol tartrate 25 MG tablet  Commonly known as:  LOPRESSOR   12.5 mg, Oral, Every 12 Hours Scheduled      phenazopyridine 100 MG tablet  Commonly known as:  PYRIDIUM   100 mg, Oral, 3 Times Daily PRN         Changes to Medications      Instructions Start Date   rivaroxaban 20 MG tablet  Commonly known as:  XARELTO  What changed:  additional instructions   20 mg, Oral, Daily         Continue These Medications      Instructions Start Date   clonazePAM 1 MG tablet  Commonly known as:  KLONOPIN   1 mg, Oral, 3 Times Daily PRN       escitalopram 20 MG tablet  Commonly known as:  LEXAPRO   20 mg, Oral, Daily      famotidine 20 MG tablet  Commonly known as:  PEPCID   20 mg, Oral, 2 Times Daily      HYDROcodone-acetaminophen 7.5-325 MG per tablet  Commonly known as:  NORCO   1 tablet, Oral, Every 4 Hours PRN      ondansetron 8 MG tablet  Commonly known as:  ZOFRAN   8 mg, Oral, 3 Times Daily PRN         Stop These Medications    hyoscyamine 0.125 MG tablet  Commonly known as:  ANASPAZ,LEVSIN     loperamide 2 MG capsule  Commonly known as:  IMODIUM            Discharge Diet:   Diet Instructions     Diet:      Diet Texture / Consistency:  Regular    Common Modifiers:  GI Soft / [place]    No raw fruits or veggies.          Activity at Discharge:     Follow-up Appointments  Future Appointments   Date Time Provider Department Center   12/20/2019 10:40 AM Geronimo Esparza MD [facility]  [facility] None   5/15/2020  2:30 PM Red Vela III, NP-C [facility] None     Additional Instructions for the Follow-ups that You Need to Schedule     Ambulatory Referral to Home Health   As directed      new ileostomy: needs WOCN x3/week    Order Comments:  new ileostomy: needs WOCN x3/week     Face to Face Visit Date:  12/2/2019    Follow-up provider for Plan of Care?:  I will be treating the patient on an ongoing basis.  Please send me the Plan of Care for signature.    Follow-up provider:  GERONIMO ESPARZA [[id number]]    Reason/Clinical Findings:  new ileostomy: needs WOCN x3/week    Describe mobility limitations that make leaving home difficult:  s/p major abdominal surgery    Nursing/Therapeutic Services Requested:  Skilled Nursing    Skilled nursing orders:  Ostomy instruction    Frequency:  1 Week 1         Discharge Follow-up with Specified Provider: honey; 1 Week   As directed      To:  honey    Follow Up:  1 Week    Follow Up Details:  Tuesday  9:30 am         Notify Physician or Go To The ED For the Following Conditions   As directed      Temp >101  Feeling weak,  feverish, vomiting, diarrhea, or constipation    Order Comments:  Temp >101 Feeling weak, feverish, vomiting, diarrhea, or constipation              Follow-up Appointments  As scheduled with Dr. Ye

## 2019-12-10 ENCOUNTER — READMISSION MANAGEMENT (OUTPATIENT)
Dept: CALL CENTER | Facility: HOSPITAL | Age: 39
End: 2019-12-10

## 2019-12-10 ENCOUNTER — OFFICE VISIT (OUTPATIENT)
Dept: SURGERY | Facility: CLINIC | Age: 39
End: 2019-12-10

## 2019-12-10 VITALS
WEIGHT: 218.3 LBS | OXYGEN SATURATION: 97 % | HEIGHT: 66 IN | TEMPERATURE: 98.3 F | SYSTOLIC BLOOD PRESSURE: 134 MMHG | BODY MASS INDEX: 35.08 KG/M2 | DIASTOLIC BLOOD PRESSURE: 72 MMHG | RESPIRATION RATE: 16 BRPM | HEART RATE: 107 BPM

## 2019-12-10 DIAGNOSIS — C20 RECTAL CANCER (HCC): Primary | ICD-10-CM

## 2019-12-10 PROBLEM — Z83.719 FAMILY HISTORY OF COLONIC POLYPS: Status: ACTIVE | Noted: 2019-12-10

## 2019-12-10 PROBLEM — K20.90 ESOPHAGITIS: Status: ACTIVE | Noted: 2019-07-08

## 2019-12-10 PROBLEM — R63.4 LOSS OF WEIGHT: Status: ACTIVE | Noted: 2019-12-10

## 2019-12-10 PROBLEM — Z83.71 FAMILY HISTORY OF COLONIC POLYPS: Status: ACTIVE | Noted: 2019-12-10

## 2019-12-10 PROBLEM — K56.600 PARTIAL INTESTINAL OBSTRUCTION, UNSPECIFIED AS TO CAUSE: Status: ACTIVE | Noted: 2019-07-08

## 2019-12-10 PROBLEM — K92.1 HEMATOCHEZIA: Status: ACTIVE | Noted: 2019-12-10

## 2019-12-10 PROBLEM — R01.1 HEART MURMUR: Status: ACTIVE | Noted: 2019-12-10

## 2019-12-10 PROBLEM — K64.4 HEMORRHOIDS, EXTERNAL: Status: ACTIVE | Noted: 2019-12-10

## 2019-12-10 PROBLEM — D12.8 BENIGN NEOPLASM OF RECTUM AND ANAL CANAL: Status: ACTIVE | Noted: 2019-07-08

## 2019-12-10 PROBLEM — K52.9 CHRONIC DIARRHEA: Status: ACTIVE | Noted: 2019-12-10

## 2019-12-10 PROBLEM — K80.20 CALCULUS OF GALLBLADDER: Status: ACTIVE | Noted: 2019-12-10

## 2019-12-10 PROBLEM — K92.2 GASTROINTESTINAL HEMORRHAGE, UNSPECIFIED: Status: ACTIVE | Noted: 2019-07-08

## 2019-12-10 PROBLEM — I49.9 IRREGULAR HEART BEAT: Status: ACTIVE | Noted: 2019-12-10

## 2019-12-10 PROBLEM — D12.3 BENIGN NEOPLASM OF TRANSVERSE COLON: Status: ACTIVE | Noted: 2019-07-08

## 2019-12-10 PROBLEM — D12.9 BENIGN NEOPLASM OF RECTUM AND ANAL CANAL: Status: ACTIVE | Noted: 2019-07-08

## 2019-12-10 PROCEDURE — 99024 POSTOP FOLLOW-UP VISIT: CPT | Performed by: COLON & RECTAL SURGERY

## 2019-12-10 NOTE — PROGRESS NOTES
"Carole Weaver is a 39 y.o. female in for follow up of Rectal cancer (CMS/HCC)  s/p laparoscopic low anterior colon resection with diverting loop ileostomy 12/2/2019  Drain decreasing in amount  Ostomy thickening up  Appetite getting better  Energy okay  Did not bring ostomy supplies  /72 (BP Location: Right arm, Patient Position: Sitting, Cuff Size: Large Adult)   Pulse 107   Temp 98.3 °F (36.8 °C) (Oral)   Resp 16   Ht 167.6 cm (65.98\")   Wt 99 kg (218 lb 4.8 oz)   SpO2 97%   Breastfeeding No   BMI 35.25 kg/m²   Body mass index is 35.25 kg/m².      PE:  Physical Exam   Constitutional: She appears well-developed. No distress.   HENT:   Head: Normocephalic and atraumatic.   Abdominal: Soft. She exhibits no distension.   Incisions healing  MAGAN serosanguineous  Right lower quadrant ostomy pink patent productive   Musculoskeletal: Normal range of motion.   Neurological: She is alert.   Psychiatric: Thought content normal.         Assessment:   1. Rectal cancer (CMS/HCC)    s/p laparoscopic low anterior colon resection with diverting loop ileostomy 12/2/2019     Plan:  NINI Garrison for ostomy nurses/home health to remove bar        "

## 2019-12-10 NOTE — OUTREACH NOTE
General Surgery Week 1 Survey      Responses   Facility patient discharged from?  [facility]   Does the patient have one of the following disease processes/diagnoses(primary or secondary)?  General Surgery   Is there a successful TCM telephone encounter documented?  No   Week 1 attempt successful?  Yes   Call start time  1528   Call end time  1530   Discharge diagnosis  lap low anterior resection with loop ileostomy,  Hx rectal CA, finished chemo   Meds reviewed with patient/caregiver?  Yes   Is the patient having any side effects they believe may be caused by any medication additions or changes?  No   Is the patient taking all medications as directed (includes completed medication regime)?  Yes   Does the patient have a follow up appointment scheduled with their surgeon?  Yes   Has the patient kept scheduled appointments due by today?  Yes   What is the Home health agency?   [facility] for new ostomy   Has home health visited the patient within 72 hours of discharge?  Yes   Psychosocial issues?  No   Did the patient receive a copy of their discharge instructions?  Yes   Nursing interventions  Reviewed instructions with patient   What is the patient's perception of their health status since discharge?  Improving   Nursing interventions  Nurse provided patient education   Is the patient /caregiver able to teach back basic post-op care?  Continue use of incentive spirometry at least 1 week post discharge, Practice 'cough and deep breath', No tub bath, swimming, or hot tub until instructed by MD, Take showers only when approved by MD-sponge bathe until then, Drive as instructed by MD in discharge instructions, Keep incision areas clean,dry and protected   Is the patient/caregiver able to teach back signs and symptoms of incisional infection?  Fever, Increased redness, swelling or pain at the incisonal site, Increased drainage or bleeding   Is the patient/caregiver able to teach back steps to recovery at home?  Rest and  rebuild strength, gradually increase activity, Set small, achievable goals for return to baseline health, Eat a well-balance diet, Make a list of questions for surgeon's appointment   Is the patient/caregiver able to teach back the hierarchy of who to call/visit for symptoms/problems? PCP, Specialist, Home health nurse, Urgent Care, ED, 911  Yes   Week 1 call completed?  Yes          Charisma Mar RN

## 2019-12-11 ENCOUNTER — LAB REQUISITION (OUTPATIENT)
Dept: LAB | Facility: HOSPITAL | Age: 39
End: 2019-12-11

## 2019-12-11 DIAGNOSIS — Z00.00 ENCOUNTER FOR GENERAL ADULT MEDICAL EXAMINATION WITHOUT ABNORMAL FINDINGS: ICD-10-CM

## 2019-12-11 LAB
BILIRUB UR QL STRIP: NEGATIVE
CLARITY UR: CLEAR
COLOR UR: YELLOW
GLUCOSE UR STRIP-MCNC: NEGATIVE MG/DL
HGB UR QL STRIP.AUTO: NEGATIVE
KETONES UR QL STRIP: NEGATIVE
LEUKOCYTE ESTERASE UR QL STRIP.AUTO: NEGATIVE
NITRITE UR QL STRIP: NEGATIVE
PH UR STRIP.AUTO: <=5 [PH] (ref 5–8)
PROT UR QL STRIP: NEGATIVE
SP GR UR STRIP: 1.02 (ref 1–1.03)
UROBILINOGEN UR QL STRIP: NORMAL

## 2019-12-11 PROCEDURE — 81003 URINALYSIS AUTO W/O SCOPE: CPT | Performed by: COLON & RECTAL SURGERY

## 2019-12-12 ENCOUNTER — TELEPHONE (OUTPATIENT)
Dept: INTERNAL MEDICINE | Facility: CLINIC | Age: 39
End: 2019-12-12

## 2019-12-12 DIAGNOSIS — C20 ADENOCARCINOMA OF RECTUM (HCC): ICD-10-CM

## 2019-12-12 DIAGNOSIS — R52 PAIN: Primary | ICD-10-CM

## 2019-12-12 RX ORDER — ONDANSETRON HYDROCHLORIDE 8 MG/1
8 TABLET, FILM COATED ORAL 3 TIMES DAILY PRN
Qty: 30 TABLET | Refills: 2 | Status: SHIPPED | OUTPATIENT
Start: 2019-12-12 | End: 2020-01-15

## 2019-12-12 RX ORDER — OXYCODONE HYDROCHLORIDE AND ACETAMINOPHEN 5; 325 MG/1; MG/1
1 TABLET ORAL EVERY 4 HOURS PRN
Qty: 90 TABLET | Refills: 0 | Status: SHIPPED | OUTPATIENT
Start: 2019-12-12 | End: 2020-01-22

## 2019-12-12 NOTE — TELEPHONE ENCOUNTER
Patient called stated  she had surgery last Monday for stage 3 colon cancer she had left over percocet 5 from her gallbladder surgery that she took and there helping with pain much better than the norco wanted to know if you can send her in a script for percocet

## 2019-12-17 ENCOUNTER — READMISSION MANAGEMENT (OUTPATIENT)
Dept: CALL CENTER | Facility: HOSPITAL | Age: 39
End: 2019-12-17

## 2019-12-17 NOTE — PAYOR COMM NOTE
"ATTN: [facility] Nurse Reviewer   REF: [id number]  RE: Discharge Notification     Kaleigh Latif  Utilization Review/Room Reservations  Phone: [place]: [phone], [place]: [phone]  Fax: [phone]  Email: [email or web address]  Please call, fax back, or email with authorization or any questions! Thanks!      Carole Weaver (39 y.o. Female)     Date of Birth Social Security Number Address Home Phone MRN    1980  [id number] [employer] [facility] [address] [phone] [id number]    Sabianism Marital Status          Sikh        Admission Date Admission Type Admitting Provider Attending Provider Department, Room/Bed    12/2/19 Elective Padmaja Ye MD  [address], [address]1    Discharge Date Discharge Disposition Discharge Destination        12/7/2019 Home-Health Care [facility]              Attending Provider:  (none)   Allergies:  No Known Allergies    Isolation:  None   Infection:  None   Code Status:  Prior    Ht:  167.6 cm (66\")   Wt:  102 kg (224 lb 9.6 oz)    Admission Cmt:  None   Principal Problem:  Rectal cancer (CMS/HCC) [C20] More...                 Active Insurance as of 12/2/2019     Primary Coverage     Payor Plan Insurance Group Employer/Plan Group    [facility] [id number]     Payor Plan Address Payor Plan Phone Number Payor Plan Fax Number Effective Dates    [address] [phone]  1/1/2018 - None Entered    [place] [address]       Subscriber Name Subscriber Birth Date Member ID       CAROLE WEAVER 1980 [id number]                 Emergency Contacts      (Rel.) Home Phone Work Phone Mobile Phone    Justus Weaver (Spouse) [phone] -- --    ROSEANNEDENIS BARBOSA (Mother) -- -- [phone]               Discharge Summary      Parul Rios PA-C at 12/07/19 1507     Attestation signed by Padmaja Ye MD at 12/10/19 4646    Padmaja TRAN. MD Ephraim,agree with the PA’s discharge summary         "            Date of Admission: 2019  Date of Discharge: 2019    Presenting Problem/History of Present Illness  Rectal cancer (CMS/HCC) [C20]  Rectal cancer (CMS/HCC) [C20]     Discharge Diagnosis:   Past Medical History:   Diagnosis Date   • Abnormal Pap smear of cervix 1998    JULIUS I   • Anemia in pregnancy    • Anxiety    • Cervical lymphadenitis 2012    SEEN AT [facility] ER   • Chronic diarrhea    • Colon polyps     FOLLOWED BY DR. GERONIMO ESPARZA   • Depression    • Factor V Leiden (CMS/HCC)     DX 2019   • GERD (gastroesophageal reflux disease)    • Heart murmur     ?   • Hematochezia    • Hemorrhoids    • History of anemia    • History of chemotherapy     FOLLOWED BY DR. ALEXANDRU HUNT   • History of gestational diabetes    • History of pre-eclampsia    • History of radiation therapy     FOLLOWED BY DR. JAVON LEWIS   • History of TB skin testing 2009    TB Skin Test   • HPV in female    • IBS (irritable bowel syndrome)    • Infected insect bite of neck 2016    SEEN AT [facility]   • Irritable bowel syndrome    • Kidney stone     HX X2 , RECENT 10/19   • Kidney stones 2007    SEEN AT [facility] ER   • Lumbar disc disease    • On anticoagulant therapy    • PIH (pregnancy induced hypertension)    • Pulmonary embolism (CMS/HCC) 2019    ADMITTED TO [facility]   • Rectal cancer (CMS/HCC) 2019    INVASIVE MODERATELY DIFFERENTIATED ADENOCARCINOMA, CHEMO AND XRT FINISHED 2019   • Right leg pain    • Right ureteral stone 10/01/2019    SEEN AT [facility] ER   • SAB (spontaneous ) 1996     A2-1 INDUCED   • Sepsis due to cellulitis (CMS/HCC) 2002    LEFT GREAT TOE, ADMITTED TO [facility]       Procedures Performed  Procedure(s):  laparoscopic low anterior colon resection with mobilization of splenic flexure and diverting loop ileostomy: ERAS      Hospital Course  Patient is a 39 y.o. female presented for planned laparoscopic low anterior colon resection with  diverting loop ileostomy s/p chemoradiation.  Procedure performed without incident 12/2/2019; patient tolerated well.    Pathology: Invasive, moderately differentiated adenocarcinoma.  All margins negative.  1 of 14 lymph nodes involved.  Post-treatment staging T3N1.    Postoperative admission: Initially, patient complained of pain with ERAS protocol pain medications. IV robaxin added, and pt's pain gradually improved.  Metoprolol was added for tachycardia and hypertension.  For history of PE while on Lovenox and heterozygous for factor V Leiden, Lovenox was changed to heparin drip POD1.  Pt's H/H dropped from 11.3/33.0 to 7.5/22.8, so heparin was d/c'ed and 2 units pRBC transfused with appropriate response in H/H.  Her diet was advanced, and she began to have ileostomy function.  She was afebrile, with stable vital signs, tolerating a soft diet, with appropriate ileostomy function, no issue with bladder function, patient's pain adequately managed with by mouth pain medication, ambulating well, she was deemed stable for discharge to home POD5 with home health for new ileostomy.      Consults:   Consults     No orders found from 11/3/2019 to 12/3/2019.            Discharge Disposition  Home or Self Care    Discharge Medications     Discharge Medications      New Medications      Instructions Start Date   calcium polycarbophil 625 MG tablet  Commonly known as:  FIBERCON   1,250 mg, Oral, 2 Times Daily      metoprolol tartrate 25 MG tablet  Commonly known as:  LOPRESSOR   12.5 mg, Oral, Every 12 Hours Scheduled      phenazopyridine 100 MG tablet  Commonly known as:  PYRIDIUM   100 mg, Oral, 3 Times Daily PRN         Changes to Medications      Instructions Start Date   rivaroxaban 20 MG tablet  Commonly known as:  XARELTO  What changed:  additional instructions   20 mg, Oral, Daily         Continue These Medications      Instructions Start Date   clonazePAM 1 MG tablet  Commonly known as:  KLONOPIN   1 mg, Oral, 3  Times Daily PRN      escitalopram 20 MG tablet  Commonly known as:  LEXAPRO   20 mg, Oral, Daily      famotidine 20 MG tablet  Commonly known as:  PEPCID   20 mg, Oral, 2 Times Daily      HYDROcodone-acetaminophen 7.5-325 MG per tablet  Commonly known as:  NORCO   1 tablet, Oral, Every 4 Hours PRN      ondansetron 8 MG tablet  Commonly known as:  ZOFRAN   8 mg, Oral, 3 Times Daily PRN         Stop These Medications    hyoscyamine 0.125 MG tablet  Commonly known as:  ANASPAZ,LEVSIN     loperamide 2 MG capsule  Commonly known as:  IMODIUM            Discharge Diet:   Diet Instructions     Diet:      Diet Texture / Consistency:  Regular    Common Modifiers:  GI Soft / [place]    No raw fruits or veggies.          Activity at Discharge:     Follow-up Appointments  Future Appointments   Date Time Provider Department Center   12/20/2019 10:40 AM Geronimo Esparza MD [facility]  [facility] None   5/15/2020  2:30 PM Red Vela III, NP-C [facility] None     Additional Instructions for the Follow-ups that You Need to Schedule     Ambulatory Referral to Home Health   As directed      new ileostomy: needs WOCN x3/week    Order Comments:  new ileostomy: needs WOCN x3/week     Face to Face Visit Date:  12/2/2019    Follow-up provider for Plan of Care?:  I will be treating the patient on an ongoing basis.  Please send me the Plan of Care for signature.    Follow-up provider:  GERONIMO ESPARZA [[id number]]    Reason/Clinical Findings:  new ileostomy: needs WOCN x3/week    Describe mobility limitations that make leaving home difficult:  s/p major abdominal surgery    Nursing/Therapeutic Services Requested:  Skilled Nursing    Skilled nursing orders:  Ostomy instruction    Frequency:  1 Week 1         Discharge Follow-up with Specified Provider: honey; 1 Week   As directed      To:  honey    Follow Up:  1 Week    Follow Up Details:  Tuesday  9:30 am         Notify Physician or Go To The ED For the Following Conditions   As directed      Temp  >101  Feeling weak, feverish, vomiting, diarrhea, or constipation    Order Comments:  Temp >101 Feeling weak, feverish, vomiting, diarrhea, or constipation              Follow-up Appointments  As scheduled with Dr. Ye    Electronically signed by Padmaja Ye MD at 12/10/19 6211

## 2019-12-17 NOTE — OUTREACH NOTE
General Surgery Week 2 Survey      Responses   Facility patient discharged from?  [facility]   Does the patient have one of the following disease processes/diagnoses(primary or secondary)?  General Surgery   Week 2 attempt successful?  Yes   Call start time  1527   Call end time  1533   Discharge diagnosis  lap low anterior resection with loop ileostomy,  Hx rectal CA, finished chemo   Is patient permission given to speak with other caregiver?  No   Is the patient taking all medications as directed (includes completed medication regime)?  Yes   Medication comments  Denies any medication issues or questions today.    Does the patient have a follow up appointment scheduled with their surgeon?  Yes   Has the patient kept scheduled appointments due by today?  Yes   What is the Home health agency?   [facility]   Has home health visited the patient within 72 hours of discharge?  Yes   Psychosocial issues?  No   Did the patient receive a copy of their discharge instructions?  Yes   Nursing interventions  Reviewed instructions with patient   What is the patient's perception of their health status since discharge?  Improving   Is the patient/caregiver able to teach back signs and symptoms of incisional infection?  -- [Patient denies any signs of infection. ]   Is the patient/caregiver able to teach back steps to recovery at home?  Rest and rebuild strength, gradually increase activity   Is the patient/caregiver able to teach back the hierarchy of who to call/visit for symptoms/problems? PCP, Specialist, Home health nurse, Urgent Care, ED, 911  Yes   Week 2 call completed?  Yes          Sandrita Spicer RN

## 2019-12-17 NOTE — PAYOR COMM NOTE
"ATTN: [facility] Nurse Reviewer  REF: [id number]  RE: Discharge Notification     Kaleigh Latfi  Utilization Review/Room Reservations  Phone: Xi [place]: [phone], [place]: [phone]  Fax: [phone]  Email: [email or web address]  Please call, fax back, or email with authorization or any questions! Thanks!          Carole Weaver (39 y.o. Female)     Date of Birth Social Security Number Address Home Phone MRN    1980  [id number] [facility] [address] [facility] [place] [facility] [address] [phone] [id number]    S[employer]ianist Marital Status          Moravian        Admission Date Admission Type Admitting Provider Attending Provider Department, Room/Bed    12/2/19 Elective Padmaja Ye MD  [address], [address]/1    Discharge Date Discharge Disposition Discharge Destination        [place] [facility] [place]ending Provider:  (none)   Allergies:  No Known Allergies    Isolation:  None   Infection:  None   Code Status:  Prior    Ht:  167.6 cm (66\")   Wt:  102 kg (224 lb 9.6 oz)    Admission Cmt:  None   Principal Problem:  Rectal cancer (CMS/HCC) [C20] More...                 Active Insurance as of 12/2/2019     Primary Coverage     Payor Plan Insurance Group Employer/Plan Group    [facility] [id number]     Payor Plan Address Payor Plan Phone Number Payor Plan Fax Number Effective Dates    [address] [phone]  1/1/2018 - None Entered    [place] [address]       Subscriber Name Subscriber Birth Date Member ID       CAROLE WEAVER 1980 [id number]                 Emergency Contacts      (Rel.) Home Phone Work Phone Mobile Phone    Justus Weaver (Spouse) [phone] -- --    ROSEANNEDENIS BARBOSA (Mother) -- -- [phone]               Discharge Summary      Parul Rios PA-C at 12/07/19 1507     Attestation signed by Padmaja Ye MD at 12/10/19 7226    Padmaja TRAN MD,agree with the PA’s discharge " summary                    Date of Admission: 2019  Date of Discharge: 2019    Presenting Problem/History of Present Illness  Rectal cancer (CMS/HCC) [C20]  Rectal cancer (CMS/HCC) [C20]     Discharge Diagnosis:   Past Medical History:   Diagnosis Date   • Abnormal Pap smear of cervix 1998    JULIUS I   • Anemia in pregnancy    • Anxiety    • Cervical lymphadenitis 2012    SEEN AT [facility] ER   • Chronic diarrhea    • Colon polyps     FOLLOWED BY DR. GERONIMO ESPARZA   • Depression    • Factor V Leiden (CMS/HCC)     DX 2019   • GERD (gastroesophageal reflux disease)    • Heart murmur     ?   • Hematochezia    • Hemorrhoids    • History of anemia    • History of chemotherapy     FOLLOWED BY DR. ALEXANDRU HUNT   • History of gestational diabetes    • History of pre-eclampsia    • History of radiation therapy     FOLLOWED BY DR. JAVON LEWIS   • History of TB skin testing 2009    TB Skin Test   • HPV in female    • IBS (irritable bowel syndrome)    • Infected insect bite of neck 2016    SEEN AT [facility]   • Irritable bowel syndrome    • Kidney stone     HX X2 , RECENT 10/19   • Kidney stones 2007    SEEN AT [facility] ER   • Lumbar disc disease    • On anticoagulant therapy    • PIH (pregnancy induced hypertension)    • Pulmonary embolism (CMS/HCC) 2019    ADMITTED TO [facility]   • Rectal cancer (CMS/HCC) 2019    INVASIVE MODERATELY DIFFERENTIATED ADENOCARCINOMA, CHEMO AND XRT FINISHED 2019   • Right leg pain    • Right ureteral stone 10/01/2019    SEEN AT [facility] ER   • SAB (spontaneous ) 1996     A2-1 INDUCED   • Sepsis due to cellulitis (CMS/HCC) 2002    LEFT GREAT TOE, ADMITTED TO [facility]       Procedures Performed  Procedure(s):  laparoscopic low anterior colon resection with mobilization of splenic flexure and diverting loop ileostomy: ERAS      Hospital Course  Patient is a 39 y.o. female presented for planned laparoscopic low anterior colon  resection with diverting loop ileostomy s/p chemoradiation.  Procedure performed without incident 12/2/2019; patient tolerated well.    Pathology: Invasive, moderately differentiated adenocarcinoma.  All margins negative.  1 of 14 lymph nodes involved.  Post-treatment staging T3N1.    Postoperative admission: Initially, patient complained of pain with ERAS protocol pain medications. IV robaxin added, and pt's pain gradually improved.  Metoprolol was added for tachycardia and hypertension.  For history of PE while on Lovenox and heterozygous for factor V Leiden, Lovenox was changed to heparin drip POD1.  Pt's H/H dropped from 11.3/33.0 to 7.5/22.8, so heparin was d/c'ed and 2 units pRBC transfused with appropriate response in H/H.  Her diet was advanced, and she began to have ileostomy function.  She was afebrile, with stable vital signs, tolerating a soft diet, with appropriate ileostomy function, no issue with bladder function, patient's pain adequately managed with by mouth pain medication, ambulating well, she was deemed stable for discharge to home POD5 with home health for new ileostomy.      Consults:   Consults     No orders found from 11/3/2019 to 12/3/2019.            Discharge Disposition  Home or Self Care    Discharge Medications     Discharge Medications      New Medications      Instructions Start Date   calcium polycarbophil 625 MG tablet  Commonly known as:  FIBERCON   1,250 mg, Oral, 2 Times Daily      metoprolol tartrate 25 MG tablet  Commonly known as:  LOPRESSOR   12.5 mg, Oral, Every 12 Hours Scheduled      phenazopyridine 100 MG tablet  Commonly known as:  PYRIDIUM   100 mg, Oral, 3 Times Daily PRN         Changes to Medications      Instructions Start Date   rivaroxaban 20 MG tablet  Commonly known as:  XARELTO  What changed:  additional instructions   20 mg, Oral, Daily         Continue These Medications      Instructions Start Date   clonazePAM 1 MG tablet  Commonly known as:  KLONOPIN   1  mg, Oral, 3 Times Daily PRN      escitalopram 20 MG tablet  Commonly known as:  LEXAPRO   20 mg, Oral, Daily      famotidine 20 MG tablet  Commonly known as:  PEPCID   20 mg, Oral, 2 Times Daily      HYDROcodone-acetaminophen 7.5-325 MG per tablet  Commonly known as:  NORCO   1 tablet, Oral, Every 4 Hours PRN      ondansetron 8 MG tablet  Commonly known as:  ZOFRAN   8 mg, Oral, 3 Times Daily PRN         Stop These Medications    hyoscyamine 0.125 MG tablet  Commonly known as:  ANASPAZ,LEVSIN     loperamide 2 MG capsule  Commonly known as:  IMODIUM            Discharge Diet:   Diet Instructions     Diet:      Diet Texture / Consistency:  Regular    Common Modifiers:  GI Soft / Corry    No raw fruits or veggies.          Activity at Discharge:     Follow-up Appointments  Future Appointments   Date Time Provider Department Center   12/20/2019 10:40 AM Geronimo Esparza MD [facility]  [facility] None   5/15/2020  2:30 PM Red Vela III, NP-C [facility] None     Additional Instructions for the Follow-ups that You Need to Schedule     Ambulatory Referral to Home Health   As directed      new ileostomy: needs WOCN x3/week    Order Comments:  new ileostomy: needs WOCN x3/week     Face to Face Visit Date:  12/2/2019    Follow-up provider for Plan of Care?:  I will be treating the patient on an ongoing basis.  Please send me the Plan of Care for signature.    Follow-up provider:  GERONIMO ESPARZA [[id number]]    Reason/Clinical Findings:  new ileostomy: needs WOCN x3/week    Describe mobility limitations that make leaving home difficult:  s/p major abdominal surgery    Nursing/Therapeutic Services Requested:  Skilled Nursing    Skilled nursing orders:  Ostomy instruction    Frequency:  1 Week 1         Discharge Follow-up with Specified Provider: honey; 1 Week   As directed      To:  honey    Follow Up:  1 Week    Follow Up Details:  Tuesday  9:30 am         Notify Physician or Go To The ED For the Following Conditions   As directed       Temp >101  Feeling weak, feverish, vomiting, diarrhea, or constipation    Order Comments:  Temp >101 Feeling weak, feverish, vomiting, diarrhea, or constipation              Follow-up Appointments  As scheduled with Dr. Ye    Electronically signed by Padmaja Ye MD at 12/10/19 3843

## 2019-12-17 NOTE — PAYOR COMM NOTE
"ATTN: [facility] Nurse Reviewer   REF: [id number]  RE: Discharge Notification     Kaleigh Latif  Utilization Review/Room Reservations  Phone: Xi [place]: [phone], [place]: [phone]  Fax: [phone]  Email: [email or web address]  Please call, fax back, or email with authorization or any questions! Thanks!        Carole Weaver (39 y.o. Female)     Date of Birth Social Security Number Address Home Phone MRN    1980  [address] [facility] [address] [phone] [id number]    Church Marital Status          Yazdanism        Admission Date Admission Type Admitting Provider Attending Provider Department, Room/Bed    12/2/19 Elective Padmaja Ye MD  [address], [address]/1    Discharge Date Discharge Disposition Discharge Destination        [place] [facility] [place]ending Provider:  (none)   Allergies:  No Known Allergies    Isolation:  None   Infection:  None   Code Status:  Prior    Ht:  167.6 cm (66\")   Wt:  102 kg (224 lb 9.6 oz)    Admission Cmt:  None   Principal Problem:  Rectal cancer (CMS/HCC) [C20] More...                 Active Insurance as of 12/2/2019     Primary Coverage     Payor Plan Insurance Group Employer/Plan Group    [facility] [id number]     Payor Plan Address Payor Plan Phone Number Payor Plan Fax Number Effective Dates    [address] [phone]  1/1/2018 - None Entered    [place] [address]       Subscriber Name Subscriber Birth Date Member ID       CAROLE WEAVER 1980 [id number]                 Emergency Contacts      (Rel.) Home Phone Work Phone Mobile Phone    Justus Weaver (Spouse) [phone] -- --    ROSEANNEDENIS BARBOSA (Mother) -- -- [phone]               Discharge Summary      Parul Rios PA-C at 12/07/19 1507     Attestation signed by Padmaja Ye MD at 12/10/19 7406    Padmaja TRAN. MD Ephraim,agree with the PA’s discharge summary         "            Date of Admission: 2019  Date of Discharge: 2019    Presenting Problem/History of Present Illness  Rectal cancer (CMS/HCC) [C20]  Rectal cancer (CMS/HCC) [C20]     Discharge Diagnosis:   Past Medical History:   Diagnosis Date   • Abnormal Pap smear of cervix 1998    JULIUS I   • Anemia in pregnancy    • Anxiety    • Cervical lymphadenitis 2012    SEEN AT [facility] ER   • Chronic diarrhea    • Colon polyps     FOLLOWED BY DR. GERONIMO ESPARZA   • Depression    • Factor V Leiden (CMS/HCC)     DX 2019   • GERD (gastroesophageal reflux disease)    • Heart murmur     ?   • Hematochezia    • Hemorrhoids    • History of anemia    • History of chemotherapy     FOLLOWED BY DR. ALEXANDRU HUNT   • History of gestational diabetes    • History of pre-eclampsia    • History of radiation therapy     FOLLOWED BY DR. JAVON LEWIS   • History of TB skin testing 2009    TB Skin Test   • HPV in female    • IBS (irritable bowel syndrome)    • Infected insect bite of neck 2016    SEEN AT [facility]   • Irritable bowel syndrome    • Kidney stone     HX X2 , RECENT 10/19   • Kidney stones 2007    SEEN AT [facility] ER   • Lumbar disc disease    • On anticoagulant therapy    • PIH (pregnancy induced hypertension)    • Pulmonary embolism (CMS/HCC) 2019    ADMITTED TO [facility]   • Rectal cancer (CMS/HCC) 2019    INVASIVE MODERATELY DIFFERENTIATED ADENOCARCINOMA, CHEMO AND XRT FINISHED 2019   • Right leg pain    • Right ureteral stone 10/01/2019    SEEN AT [facility] ER   • SAB (spontaneous ) 1996     A2-1 INDUCED   • Sepsis due to cellulitis (CMS/HCC) 2002    LEFT GREAT TOE, ADMITTED TO [facility]       Procedures Performed  Procedure(s):  laparoscopic low anterior colon resection with mobilization of splenic flexure and diverting loop ileostomy: ERAS      Hospital Course  Patient is a 39 y.o. female presented for planned laparoscopic low anterior colon resection with  diverting loop ileostomy s/p chemoradiation.  Procedure performed without incident 12/2/2019; patient tolerated well.    Pathology: Invasive, moderately differentiated adenocarcinoma.  All margins negative.  1 of 14 lymph nodes involved.  Post-treatment staging T3N1.    Postoperative admission: Initially, patient complained of pain with ERAS protocol pain medications. IV robaxin added, and pt's pain gradually improved.  Metoprolol was added for tachycardia and hypertension.  For history of PE while on Lovenox and heterozygous for factor V Leiden, Lovenox was changed to heparin drip POD1.  Pt's H/H dropped from 11.3/33.0 to 7.5/22.8, so heparin was d/c'ed and 2 units pRBC transfused with appropriate response in H/H.  Her diet was advanced, and she began to have ileostomy function.  She was afebrile, with stable vital signs, tolerating a soft diet, with appropriate ileostomy function, no issue with bladder function, patient's pain adequately managed with by mouth pain medication, ambulating well, she was deemed stable for discharge to home POD5 with home health for new ileostomy.      Consults:   Consults     No orders found from 11/3/2019 to 12/3/2019.            Discharge Disposition  Home or Self Care    Discharge Medications     Discharge Medications      New Medications      Instructions Start Date   calcium polycarbophil 625 MG tablet  Commonly known as:  FIBERCON   1,250 mg, Oral, 2 Times Daily      metoprolol tartrate 25 MG tablet  Commonly known as:  LOPRESSOR   12.5 mg, Oral, Every 12 Hours Scheduled      phenazopyridine 100 MG tablet  Commonly known as:  PYRIDIUM   100 mg, Oral, 3 Times Daily PRN         Changes to Medications      Instructions Start Date   rivaroxaban 20 MG tablet  Commonly known as:  XARELTO  What changed:  additional instructions   20 mg, Oral, Daily         Continue These Medications      Instructions Start Date   clonazePAM 1 MG tablet  Commonly known as:  KLONOPIN   1 mg, Oral, 3  Times Daily PRN      escitalopram 20 MG tablet  Commonly known as:  LEXAPRO   20 mg, Oral, Daily      famotidine 20 MG tablet  Commonly known as:  PEPCID   20 mg, Oral, 2 Times Daily      HYDROcodone-acetaminophen 7.5-325 MG per tablet  Commonly known as:  NORCO   1 tablet, Oral, Every 4 Hours PRN      ondansetron 8 MG tablet  Commonly known as:  ZOFRAN   8 mg, Oral, 3 Times Daily PRN         Stop These Medications    hyoscyamine 0.125 MG tablet  Commonly known as:  ANASPAZ,LEVSIN     loperamide 2 MG capsule  Commonly known as:  IMODIUM            Discharge Diet:   Diet Instructions     Diet:      Diet Texture / Consistency:  Regular    Common Modifiers:  GI Soft / Jewell    No raw fruits or veggies.          Activity at Discharge:     Follow-up Appointments  Future Appointments   Date Time Provider Department Center   12/20/2019 10:40 AM Geronimo Esparza MD [facility]  [facility] None   5/15/2020  2:30 PM Red Vela III, NP-C [facility] None     Additional Instructions for the Follow-ups that You Need to Schedule     Ambulatory Referral to Home Health   As directed      new ileostomy: needs WOCN x3/week    Order Comments:  new ileostomy: needs WOCN x3/week     Face to Face Visit Date:  12/2/2019    Follow-up provider for Plan of Care?:  I will be treating the patient on an ongoing basis.  Please send me the Plan of Care for signature.    Follow-up provider:  GERONIMO ESPARZA [[id number]]    Reason/Clinical Findings:  new ileostomy: needs WOCN x3/week    Describe mobility limitations that make leaving home difficult:  s/p major abdominal surgery    Nursing/Therapeutic Services Requested:  Skilled Nursing    Skilled nursing orders:  Ostomy instruction    Frequency:  1 Week 1         Discharge Follow-up with Specified Provider: honey; 1 Week   As directed      To:  honey    Follow Up:  1 Week    Follow Up Details:  Tuesday  9:30 am         Notify Physician or Go To The ED For the Following Conditions   As directed      Temp  >101  Feeling weak, feverish, vomiting, diarrhea, or constipation    Order Comments:  Temp >101 Feeling weak, feverish, vomiting, diarrhea, or constipation              Follow-up Appointments  As scheduled with Dr. Ye    Electronically signed by Padmaja Ye MD at 12/10/19 4487

## 2019-12-17 NOTE — PAYOR COMM NOTE
"Carole Weaver (39 y.o. Female)     Date of Birth Social Security Number Address Home Phone MRN    1980  [id number] [place] [id number] [phone] [id number]    [place] Marital Status          Restorationism        Admission Date Admission Type Admitting Provider Attending Provider Department, Room/Bed    12/2/19 Elective Padmaja Ye MD  [address], [address]/1    Discharge Date Discharge Disposition Discharge Destination        12/7/2019 Home-Health Care c              Attending Provider:  (none)   Allergies:  No Known Allergies    Isolation:  None   Infection:  None   Code Status:  Prior    Ht:  167.6 cm (66\")   Wt:  102 kg (224 lb 9.6 oz)    Admission Cmt:  None   Principal Problem:  Rectal cancer (CMS/HCC) [C20] More...                 Active Insurance as of 12/2/2019     Primary Coverage     Payor Plan Insurance Group Employer/Plan Group    [employer] PPO [id number]     Payor Plan Address Payor Plan Phone Number Payor Plan Fax Number Effective Dates    [address] [phone]  1/1/2018 - None Entered    [facility] [id number]       Subscriber Name Subscriber Birth Date Member ID       CAROLE WEAVER 1980 [id number]                 Emergency Contacts      (Rel.) Home Phone Work Phone Mobile Phone    Justus Weaver (Spouse) [phone] -- --    DENIS CRONIN (Mother) -- -- [phone]               Discharge Summary      Parul Rios PA-C at 12/07/19 1507     Attestation signed by Padmaja Ye MD at 12/10/19 1626    Padmaja TRAN. MD Ephraim,agree with the PA’s discharge summary                    Date of Admission: 12/2/2019  Date of Discharge: 12/7/2019    Presenting Problem/History of Present Illness  Rectal cancer (CMS/HCC) [C20]  Rectal cancer (CMS/HCC) [C20]     Discharge Diagnosis:   Past Medical History:   Diagnosis Date   • Abnormal Pap smear of cervix 02/02/1998    JULIUS I   • Anemia in " pregnancy    • Anxiety    • Cervical lymphadenitis 2012    SEEN AT [facility] ER   • Chronic diarrhea    • Colon polyps     FOLLOWED BY DR. GERONIMO ESPARZA   • Depression    • Factor V Leiden (CMS/HCC)     DX 2019   • GERD (gastroesophageal reflux disease)    • Heart murmur     ?   • Hematochezia    • Hemorrhoids    • History of anemia    • History of chemotherapy     FOLLOWED BY DR. ALEXANDRU HUNT   • History of gestational diabetes    • History of pre-eclampsia    • History of radiation therapy     FOLLOWED BY DR. JAVON LEWIS   • History of TB skin testing 2009    TB Skin Test   • HPV in female    • IBS (irritable bowel syndrome)    • Infected insect bite of neck 2016    SEEN AT [facility]   • Irritable bowel syndrome    • Kidney stone     HX X2 , RECENT 10/19   • Kidney stones 2007    SEEN AT [facility] ER   • Lumbar disc disease    • On anticoagulant therapy    • PIH (pregnancy induced hypertension)    • Pulmonary embolism (CMS/HCC) 2019    ADMITTED TO [facility]   • Rectal cancer (CMS/HCC) 2019    INVASIVE MODERATELY DIFFERENTIATED ADENOCARCINOMA, CHEMO AND XRT FINISHED 2019   • Right leg pain    • Right ureteral stone 10/01/2019    SEEN AT [facility] ER   • SAB (spontaneous ) 1996     A2-1 INDUCED   • Sepsis due to cellulitis (CMS/HCC) 2002    LEFT GREAT TOE, ADMITTED TO [facility]       Procedures Performed  Procedure(s):  laparoscopic low anterior colon resection with mobilization of splenic flexure and diverting loop ileostomy: ERAS      Hospital Course  Patient is a 39 y.o. female presented for planned laparoscopic low anterior colon resection with diverting loop ileostomy s/p chemoradiation.  Procedure performed without incident 2019; patient tolerated well.    Pathology: Invasive, moderately differentiated adenocarcinoma.  All margins negative.  1 of 14 lymph nodes involved.  Post-treatment staging T3N1.    Postoperative admission: Initially, patient  complained of pain with ERAS protocol pain medications. IV robaxin added, and pt's pain gradually improved.  Metoprolol was added for tachycardia and hypertension.  For history of PE while on Lovenox and heterozygous for factor V Leiden, Lovenox was changed to heparin drip POD1.  Pt's H/H dropped from 11.3/33.0 to 7.5/22.8, so heparin was d/c'ed and 2 units pRBC transfused with appropriate response in H/H.  Her diet was advanced, and she began to have ileostomy function.  She was afebrile, with stable vital signs, tolerating a soft diet, with appropriate ileostomy function, no issue with bladder function, patient's pain adequately managed with by mouth pain medication, ambulating well, she was deemed stable for discharge to home POD5 with home health for new ileostomy.      Consults:   Consults     No orders found from 11/3/2019 to 12/3/2019.            Discharge Disposition  Home or Self Care    Discharge Medications     Discharge Medications      New Medications      Instructions Start Date   calcium polycarbophil 625 MG tablet  Commonly known as:  FIBERCON   1,250 mg, Oral, 2 Times Daily      metoprolol tartrate 25 MG tablet  Commonly known as:  LOPRESSOR   12.5 mg, Oral, Every 12 Hours Scheduled      phenazopyridine 100 MG tablet  Commonly known as:  PYRIDIUM   100 mg, Oral, 3 Times Daily PRN         Changes to Medications      Instructions Start Date   rivaroxaban 20 MG tablet  Commonly known as:  XARELTO  What changed:  additional instructions   20 mg, Oral, Daily         Continue These Medications      Instructions Start Date   clonazePAM 1 MG tablet  Commonly known as:  KLONOPIN   1 mg, Oral, 3 Times Daily PRN      escitalopram 20 MG tablet  Commonly known as:  LEXAPRO   20 mg, Oral, Daily      famotidine 20 MG tablet  Commonly known as:  PEPCID   20 mg, Oral, 2 Times Daily      HYDROcodone-acetaminophen 7.5-325 MG per tablet  Commonly known as:  NORCO   1 tablet, Oral, Every 4 Hours PRN      ondansetron 8  MG tablet  Commonly known as:  ZOFRAN   8 mg, Oral, 3 Times Daily PRN         Stop These Medications    hyoscyamine 0.125 MG tablet  Commonly known as:  ANASPAZ,LEVSIN     loperamide 2 MG capsule  Commonly known as:  IMODIUM            Discharge Diet:   Diet Instructions     Diet:      Diet Texture / Consistency:  Regular    Common Modifiers:  GI Soft / [place]    No raw fruits or veggies.          Activity at Discharge:     Follow-up Appointments  Future Appointments   Date Time Provider Department Center   12/20/2019 10:40 AM Geronimo Esparza MD [facility] None   5/15/2020  2:30 PM Red Vela III, NP-C [facility] None     Additional Instructions for the Follow-ups that You Need to Schedule     Ambulatory Referral to Home Health   As directed      new ileostomy: needs WOCN x3/week    Order Comments:  new ileostomy: needs WOCN x3/week     Face to Face Visit Date:  12/2/2019    Follow-up provider for Plan of Care?:  I will be treating the patient on an ongoing basis.  Please send me the Plan of Care for signature.    Follow-up provider:  GERONIMO ESPARZA [[id number]]    Reason/Clinical Findings:  new ileostomy: needs WOCN x3/week    Describe mobility limitations that make leaving home difficult:  s/p major abdominal surgery    Nursing/Therapeutic Services Requested:  Skilled Nursing    Skilled nursing orders:  Ostomy instruction    Frequency:  1 Week 1         Discharge Follow-up with Specified Provider: honey; 1 Week   As directed      To:  honey    Follow Up:  1 Week    Follow Up Details:  Tuesday  9:30 am         Notify Physician or Go To The ED For the Following Conditions   As directed      Temp >101  Feeling weak, feverish, vomiting, diarrhea, or constipation    Order Comments:  Temp >101 Feeling weak, feverish, vomiting, diarrhea, or constipation              Follow-up Appointments  As scheduled with Dr. Esparza    Electronically signed by Geronimo Esparza MD at 12/10/19 9432

## 2019-12-20 ENCOUNTER — INFUSION (OUTPATIENT)
Dept: ONCOLOGY | Facility: HOSPITAL | Age: 39
End: 2019-12-20

## 2019-12-20 ENCOUNTER — OFFICE VISIT (OUTPATIENT)
Dept: SURGERY | Facility: CLINIC | Age: 39
End: 2019-12-20

## 2019-12-20 VITALS
WEIGHT: 212.5 LBS | BODY MASS INDEX: 34.15 KG/M2 | OXYGEN SATURATION: 98 % | SYSTOLIC BLOOD PRESSURE: 118 MMHG | HEIGHT: 66 IN | HEART RATE: 78 BPM | TEMPERATURE: 97.4 F | DIASTOLIC BLOOD PRESSURE: 78 MMHG | RESPIRATION RATE: 16 BRPM

## 2019-12-20 DIAGNOSIS — Z45.2 ENCOUNTER FOR FITTING AND ADJUSTMENT OF VASCULAR CATHETER: Primary | ICD-10-CM

## 2019-12-20 DIAGNOSIS — C20 RECTAL CANCER (HCC): Primary | ICD-10-CM

## 2019-12-20 DIAGNOSIS — Z79.01 ANTICOAGULATED: ICD-10-CM

## 2019-12-20 PROCEDURE — 96523 IRRIG DRUG DELIVERY DEVICE: CPT | Performed by: INTERNAL MEDICINE

## 2019-12-20 PROCEDURE — 99024 POSTOP FOLLOW-UP VISIT: CPT | Performed by: COLON & RECTAL SURGERY

## 2019-12-20 RX ORDER — SODIUM CHLORIDE 0.9 % (FLUSH) 0.9 %
10 SYRINGE (ML) INJECTION AS NEEDED
Status: CANCELLED | OUTPATIENT
Start: 2019-12-20

## 2019-12-20 RX ORDER — SODIUM CHLORIDE 0.9 % (FLUSH) 0.9 %
10 SYRINGE (ML) INJECTION AS NEEDED
Status: DISCONTINUED | OUTPATIENT
Start: 2019-12-20 | End: 2019-12-20 | Stop reason: HOSPADM

## 2019-12-20 RX ADMIN — Medication 10 ML: at 11:45

## 2019-12-20 RX ADMIN — Medication 500 UNITS: at 11:45

## 2019-12-20 NOTE — PROGRESS NOTES
"Carole Weaver is a 39 y.o. female in for follow up of Rectal cancer (CMS/HCC)    Anticoagulated  s/p laparoscopic low anterior colon resection with diverting loop ileostomy 12/2/2019    Pt states overall she is doing well  She just had a blowout, and her peristomal skin is irritated    She has some low pelvic cramping.  She has not had a period since September    MAGAN site and incision feel good    She does have some pressure in her rectum.  She has not had any drainage per rectum    /78   Pulse 78   Temp 97.4 °F (36.3 °C) (Oral)   Resp 16   Ht 167.6 cm (65.98\")   Wt 96.4 kg (212 lb 8 oz)   SpO2 98%   BMI 34.32 kg/m²   Body mass index is 34.32 kg/m².      PE:  Physical Exam   Constitutional: She appears well-developed. No distress.   HENT:   Head: Normocephalic and atraumatic.   Abdominal:   -s/nt/nd  -stoma pink with appliance in place  -umbilical lap site with superficial separation; packed with half inch plain gauze   Musculoskeletal: Normal range of motion.   Neurological: She is alert.   Psychiatric: Thought content normal.       Assessment:   1. Rectal cancer (CMS/HCC)    2. Anticoagulated     s/p laparoscopic low anterior colon resection with diverting loop ileostomy 12/2/2019    Plan:    Overall she is healing well postop. She should pack lap site with gauze daily; instructions and supplies given.    Will discuss Dr. Nguyen regarding ileostomy takedown before or after adjuvant chemo.    Call or come in if any questions or concerns      RTC 3 weeks    Scribed for Padmaja Ye MD by Parul Rios PA-C  12/20/2019   This patient was evaluated by me, recommendations made, documentation reviewed, edited, and revised by me, Padmaja Ye MD  "

## 2019-12-26 ENCOUNTER — READMISSION MANAGEMENT (OUTPATIENT)
Dept: CALL CENTER | Facility: HOSPITAL | Age: 39
End: 2019-12-26

## 2019-12-26 NOTE — OUTREACH NOTE
General Surgery Week 3 Survey      Responses   Facility patient discharged from?  [facility]   Does the patient have one of the following disease processes/diagnoses(primary or secondary)?  General Surgery   Week 3 attempt successful?  No   Unsuccessful attempts  Attempt 1          Edelmira Marte RN

## 2019-12-27 ENCOUNTER — READMISSION MANAGEMENT (OUTPATIENT)
Dept: CALL CENTER | Facility: HOSPITAL | Age: 39
End: 2019-12-27

## 2019-12-27 NOTE — OUTREACH NOTE
General Surgery Week 3 Survey      Responses   Facility patient discharged from?  [facility]   Does the patient have one of the following disease processes/diagnoses(primary or secondary)?  General Surgery   Week 3 attempt successful?  Yes   Call start time  1359   Call end time  1401   Discharge diagnosis  lap low anterior resection with loop ileostomy,  Hx rectal CA, finished chemo   Meds reviewed with patient/caregiver?  Yes   Does the patient have all medications related to this admission filled (includes all antibiotics, pain medications, etc.)  Yes   Is the patient taking all medications as directed (includes completed medication regime)?  Yes   Has the patient kept scheduled appointments due by today?  Yes   What is the Home health agency?   [facility]   Psychosocial issues?  No   What is the patient's perception of their health status since discharge?  Improving   Nursing interventions  Advised patient to call provider   Is the patient /caregiver able to teach back basic post-op care?  Lifting as instructed by MD in discharge instructions   Is the patient/caregiver able to teach back signs and symptoms of incisional infection?  Fever   Is the patient/caregiver able to teach back steps to recovery at home?  Make a list of questions for surgeon's appointment, Set small, achievable goals for return to baseline health, Eat a well-balance diet   Week 3 call completed?  Yes          Charisma Mar RN

## 2020-01-02 DIAGNOSIS — C20 ADENOCARCINOMA OF RECTUM (HCC): ICD-10-CM

## 2020-01-02 RX ORDER — HYDROCODONE BITARTRATE AND ACETAMINOPHEN 7.5; 325 MG/1; MG/1
1 TABLET ORAL EVERY 4 HOURS PRN
Qty: 80 TABLET | Refills: 0 | Status: SHIPPED | OUTPATIENT
Start: 2020-01-02 | End: 2020-01-17 | Stop reason: SDUPTHER

## 2020-01-02 NOTE — TELEPHONE ENCOUNTER
Contract needs to be updated  Levi ran 01/02/20   Last OV: 11/14/19  Next OV: 05/15/19    Pt is out and would like to know if she could get this filled early?

## 2020-01-07 ENCOUNTER — OFFICE VISIT (OUTPATIENT)
Dept: SURGERY | Facility: CLINIC | Age: 40
End: 2020-01-07

## 2020-01-07 VITALS
OXYGEN SATURATION: 95 % | TEMPERATURE: 98.2 F | BODY MASS INDEX: 34.89 KG/M2 | SYSTOLIC BLOOD PRESSURE: 120 MMHG | DIASTOLIC BLOOD PRESSURE: 76 MMHG | HEART RATE: 115 BPM | HEIGHT: 66 IN | WEIGHT: 217.1 LBS

## 2020-01-07 DIAGNOSIS — C20 RECTAL CANCER (HCC): Primary | ICD-10-CM

## 2020-01-07 DIAGNOSIS — Z79.01 ANTICOAGULATED: ICD-10-CM

## 2020-01-07 PROCEDURE — 99024 POSTOP FOLLOW-UP VISIT: CPT | Performed by: COLON & RECTAL SURGERY

## 2020-01-07 NOTE — PROGRESS NOTES
"Carole Weaver is a 39 y.o. female in for follow up of Rectal cancer (CMS/HCC)    Anticoagulated  s/p laparoscopic low anterior colon resection with diverting loop ileostomy 12/2/2019    Pt states Home Health [facility] is coming out  Peristomal skin is improving.  No issues with function of ostomy    She is still having some mucus per rectum    Azo has been helpful for her bladder  After she urinates she has abdominal cramping, and her bag is 1/4 full    She has not had a period since Sept 2019    She feels ready to RTW in the next couple of weeks    /76 (BP Location: Left arm, Patient Position: Sitting, Cuff Size: Adult)   Pulse 115   Temp 98.2 °F (36.8 °C) (Oral)   Ht 167.6 cm (66\")   Wt 98.5 kg (217 lb 1.6 oz)   LMP  (LMP Unknown)   SpO2 95%   Breastfeeding No   BMI 35.04 kg/m²   Body mass index is 35.04 kg/m².      PE:  Physical Exam   Constitutional: She appears well-developed. No distress.   HENT:   Head: Normocephalic and atraumatic.   Abdominal:   -s/nt/nd  -midline vertical incision c/d/i  -umbilical lap site with deep tunneling; opened cephalad and caudal approx 1cm and re-packed  -peristomal skin with improvement in skin integrity.  Appliance in place   Musculoskeletal: Normal range of motion.   Neurological: She is alert.   Psychiatric: Thought content normal.       Assessment:   1. Rectal cancer (CMS/HCC)    2. Anticoagulated     s/p laparoscopic low anterior colon resection with diverting loop ileostomy 12/2/2019    Plan:    Opened umbilical lap site due to tunneling; continue daily wound packing with gauze.  Otherwise she is continuing to heal well postop.    Will discuss Dr. Nguyen regarding ileostomy takedown before or after adjuvant chemo.    Will assess RTW at next appt.      Call or come in if any questions or concerns    RTC 3 weeks      Scribed for Padmaja Ye MD by Parul Rios PACarloC  1/7/2020   This patient was evaluated by me, recommendations made, documentation reviewed, " edited, and revised by me, Padmaja Ye MD

## 2020-01-14 RX ORDER — FAMOTIDINE 20 MG/1
TABLET, FILM COATED ORAL
Qty: 60 TABLET | Refills: 0 | Status: SHIPPED | OUTPATIENT
Start: 2020-01-14 | End: 2020-02-10

## 2020-01-15 DIAGNOSIS — C20 ADENOCARCINOMA OF RECTUM (HCC): Primary | ICD-10-CM

## 2020-01-15 RX ORDER — ONDANSETRON HYDROCHLORIDE 8 MG/1
8 TABLET, FILM COATED ORAL 3 TIMES DAILY PRN
Qty: 60 TABLET | Refills: 5 | Status: SHIPPED | OUTPATIENT
Start: 2020-01-15 | End: 2020-10-05

## 2020-01-16 ENCOUNTER — TELEPHONE (OUTPATIENT)
Dept: ONCOLOGY | Facility: CLINIC | Age: 40
End: 2020-01-16

## 2020-01-16 ENCOUNTER — TELEPHONE (OUTPATIENT)
Dept: INTERNAL MEDICINE | Facility: CLINIC | Age: 40
End: 2020-01-16

## 2020-01-16 NOTE — TELEPHONE ENCOUNTER
----- Message from Dong Nguyen MD PhD sent at 1/15/2020 11:46 AM EST -----  Regarding: Follow-up appointment for chemotherapy  For  staff ,   please arrange patient come back to see me next week, 2 units, also book for chemotherapy FOLFOX 6 regimen.  I left a message on her voicemail.    For insurance clearance staff:  Please obtain approval for FOLFOX 6 regimen to be given next week.    Thank you very much!    WJZ.

## 2020-01-16 NOTE — TELEPHONE ENCOUNTER
Dr LIN gave her that for a 30 day supply. Would need to wait 30 days.   If she is having worsening pain, she would need to discuss with people managing her rectal CA.   Norco increases constipation risks after her surgery.

## 2020-01-16 NOTE — TELEPHONE ENCOUNTER
Pt called requesting refill on Hydrocodone, she just received a refill for 80 tablets on 01/02/20 by Dr Mg. Please advise.     Contract signed: 01/07/20  Levi ran 12/02/19  Last OV: 11/14/19  Next OV: 05/15/20

## 2020-01-17 ENCOUNTER — INFUSION (OUTPATIENT)
Dept: ONCOLOGY | Facility: HOSPITAL | Age: 40
End: 2020-01-17

## 2020-01-17 ENCOUNTER — OFFICE VISIT (OUTPATIENT)
Dept: ONCOLOGY | Facility: CLINIC | Age: 40
End: 2020-01-17

## 2020-01-17 VITALS — BODY MASS INDEX: 34.25 KG/M2 | WEIGHT: 212.2 LBS

## 2020-01-17 DIAGNOSIS — Z45.2 ENCOUNTER FOR FITTING AND ADJUSTMENT OF VASCULAR CATHETER: Primary | ICD-10-CM

## 2020-01-17 DIAGNOSIS — C20 ADENOCARCINOMA OF RECTUM (HCC): Primary | ICD-10-CM

## 2020-01-17 DIAGNOSIS — C20 ADENOCARCINOMA OF RECTUM (HCC): ICD-10-CM

## 2020-01-17 PROCEDURE — 36593 DECLOT VASCULAR DEVICE: CPT

## 2020-01-17 PROCEDURE — 99214 OFFICE O/P EST MOD 30 MIN: CPT | Performed by: NURSE PRACTITIONER

## 2020-01-17 PROCEDURE — 25010000002 ALTEPLASE 2 MG RECONSTITUTED SOLUTION: Performed by: NURSE PRACTITIONER

## 2020-01-17 RX ORDER — SODIUM CHLORIDE 0.9 % (FLUSH) 0.9 %
10 SYRINGE (ML) INJECTION AS NEEDED
Status: DISCONTINUED | OUTPATIENT
Start: 2020-01-17 | End: 2020-01-17 | Stop reason: HOSPADM

## 2020-01-17 RX ORDER — HEPARIN SODIUM (PORCINE) LOCK FLUSH IV SOLN 100 UNIT/ML 100 UNIT/ML
500 SOLUTION INTRAVENOUS AS NEEDED
Status: CANCELLED | OUTPATIENT
Start: 2020-01-17

## 2020-01-17 RX ORDER — SODIUM CHLORIDE 0.9 % (FLUSH) 0.9 %
10 SYRINGE (ML) INJECTION AS NEEDED
Status: CANCELLED | OUTPATIENT
Start: 2020-01-17

## 2020-01-17 RX ORDER — HYDROCODONE BITARTRATE AND ACETAMINOPHEN 7.5; 325 MG/1; MG/1
1 TABLET ORAL EVERY 4 HOURS PRN
Qty: 180 TABLET | Refills: 0 | Status: SHIPPED | OUTPATIENT
Start: 2020-01-17 | End: 2020-02-19 | Stop reason: SDUPTHER

## 2020-01-17 RX ADMIN — ALTEPLASE: 2.2 INJECTION, POWDER, LYOPHILIZED, FOR SOLUTION INTRAVENOUS at 14:26

## 2020-01-17 NOTE — PROGRESS NOTES
PT REQUESTED TO LEAVE ACTIVASE DWELLING TILL NEXT TREATMENT. NO BLD RTN FROM PORT. FLUSHED EASILY THOUGH W/O ANY RESISTANCE OR DISCOMFORT TO THE PT.

## 2020-01-17 NOTE — TELEPHONE ENCOUNTER
Pt advised. Pt states that she has an ileostomy bag. She states that Dr LIN was going to call in 190 tablets. She was switched to percocet until you took over. She states it was not intended to be only 80 tablets for 30 days. She states that her surgeon does not prescribe pain medication and that is why Dr LIN prescribed this for her.

## 2020-01-17 NOTE — PROGRESS NOTES
____________________PATIENT EDUCATION____________________    PATIENT EDUCATION:  Today I met with the patient to discuss the chemotherapy regimen recommended for treatment of his disease.  The patient was given explanation of treatment premed side effects including office policy that prohibits patients to drive if sedating medications are administered, MD explanation given regarding benefits, side effects, toxicities and goals of treatment.  The patient received a Chemotherapy/Biotherapy Plan Summary including diagnosis and specific treatment plan.    SIDE EFFECTS:  Common side effects were discussed with the patient and/or significant other.  Discussion included hair loss/discoloration, anemia/fatigue, infection/chills/fever, appetite, bleeding risk/precautions, constipation, diarrhea, mouth sores, taste alteration, loss of appetite,nausea/vomiting, peripheral neuropathy, skin/nail changes, rash, muscle aches/weakness, photosensitivity, weight gain/loss, hearing loss, dizziness, menopausal symptoms, menstrrual irregularity, sterility, high blood pressure, heart damage, liver damage, lung damage, kidney damage, DVT/PE risk, fluid retention, pleural/pericardial effusion, somnolence, electrolyte/LFT imbalance, vein exercises and/or the possible need for vascular access/port placement.  The patient was advice that although uncommon, leakage of an infused medication from the vein or venous access device (port) may lead to skin breakdown and/or other tissue damage.  The patient was advised that he/she may have pain, bleeding, and/or bruising from the insertion of a needle in their vein or venous access device (port).  The patient was further advised that, in spite of proper technique, infection with redness and irritation may rarely occur at the site where the needle was inserted.  The patient was advised that if complications occur, additional medical treatment is available.    Discussion also included side effects  specific to drugs in the treatment plan, specifically 5FU, leucovorin, oxaliplatin.    Reproductive risks were discussed, including appropriate use of birth control and protection during sexual relations.    Physical Exam   Constitutional: She is oriented to person, place, and time. She appears well-developed and well-nourished. No distress.   Pulmonary/Chest: Effort normal. No respiratory distress.   Musculoskeletal: She exhibits no edema.   Neurological: She is alert and oriented to person, place, and time.   Skin: Skin is warm and dry.   Psychiatric: She has a normal mood and affect.     Survivorship referral placed if appropriate yes.    A total of 30 minutes were spent with the patient, with 100% of time spent in education and counseling.

## 2020-01-22 ENCOUNTER — INFUSION (OUTPATIENT)
Dept: ONCOLOGY | Facility: HOSPITAL | Age: 40
End: 2020-01-22

## 2020-01-22 ENCOUNTER — HOSPITAL ENCOUNTER (OUTPATIENT)
Dept: GENERAL RADIOLOGY | Facility: HOSPITAL | Age: 40
Discharge: HOME | End: 2020-01-22
Admitting: INTERNAL MEDICINE

## 2020-01-22 ENCOUNTER — OFFICE VISIT (OUTPATIENT)
Dept: ONCOLOGY | Facility: CLINIC | Age: 40
End: 2020-01-22

## 2020-01-22 VITALS
TEMPERATURE: 98.4 F | DIASTOLIC BLOOD PRESSURE: 89 MMHG | HEART RATE: 120 BPM | HEIGHT: 65 IN | WEIGHT: 213.3 LBS | SYSTOLIC BLOOD PRESSURE: 139 MMHG | RESPIRATION RATE: 16 BRPM | BODY MASS INDEX: 35.54 KG/M2 | OXYGEN SATURATION: 95 %

## 2020-01-22 DIAGNOSIS — E87.6 HYPOKALEMIA: ICD-10-CM

## 2020-01-22 DIAGNOSIS — C20 ADENOCARCINOMA OF RECTUM (HCC): Primary | ICD-10-CM

## 2020-01-22 DIAGNOSIS — D64.9 ANEMIA, UNSPECIFIED TYPE: ICD-10-CM

## 2020-01-22 DIAGNOSIS — Z79.01 ANTICOAGULATION ADEQUATE: ICD-10-CM

## 2020-01-22 DIAGNOSIS — T82.898A OTHER COMPLICATION OF VASCULAR DEVICE, INITIAL ENCOUNTER (HCC): Primary | ICD-10-CM

## 2020-01-22 DIAGNOSIS — I26.99 OTHER PULMONARY EMBOLISM WITHOUT ACUTE COR PULMONALE, UNSPECIFIED CHRONICITY (HCC): ICD-10-CM

## 2020-01-22 DIAGNOSIS — Z45.2 ENCOUNTER FOR FITTING AND ADJUSTMENT OF VASCULAR CATHETER: ICD-10-CM

## 2020-01-22 DIAGNOSIS — D68.51 HETEROZYGOUS FACTOR V LEIDEN MUTATION (HCC): ICD-10-CM

## 2020-01-22 DIAGNOSIS — C20 ADENOCARCINOMA OF RECTUM (HCC): ICD-10-CM

## 2020-01-22 LAB
ALBUMIN SERPL-MCNC: 3.5 G/DL (ref 3.5–5.2)
ALBUMIN/GLOB SERPL: 0.9 G/DL (ref 1.1–2.4)
ALP SERPL-CCNC: 83 U/L (ref 38–116)
ALT SERPL W P-5'-P-CCNC: 36 U/L (ref 0–33)
ANION GAP SERPL CALCULATED.3IONS-SCNC: 11.9 MMOL/L (ref 5–15)
AST SERPL-CCNC: 29 U/L (ref 0–32)
BASOPHILS # BLD AUTO: 0.02 10*3/MM3 (ref 0–0.2)
BASOPHILS NFR BLD AUTO: 0.4 % (ref 0–1.5)
BILIRUB SERPL-MCNC: 0.2 MG/DL (ref 0.2–1.2)
BUN BLD-MCNC: 5 MG/DL (ref 6–20)
BUN/CREAT SERPL: 6.6 (ref 7.3–30)
CALCIUM SPEC-SCNC: 9.3 MG/DL (ref 8.5–10.2)
CEA SERPL-MCNC: 0.84 NG/ML
CHLORIDE SERPL-SCNC: 101 MMOL/L (ref 98–107)
CO2 SERPL-SCNC: 26.1 MMOL/L (ref 22–29)
CREAT BLD-MCNC: 0.76 MG/DL (ref 0.6–1.1)
DEPRECATED RDW RBC AUTO: 43.9 FL (ref 37–54)
EOSINOPHIL # BLD AUTO: 0.17 10*3/MM3 (ref 0–0.4)
EOSINOPHIL NFR BLD AUTO: 3 % (ref 0.3–6.2)
ERYTHROCYTE [DISTWIDTH] IN BLOOD BY AUTOMATED COUNT: 13.7 % (ref 12.3–15.4)
FERRITIN SERPL-MCNC: 168.1 NG/ML (ref 11–207)
GFR SERPL CREATININE-BSD FRML MDRD: 84 ML/MIN/1.73
GLOBULIN UR ELPH-MCNC: 3.8 GM/DL (ref 1.8–3.5)
GLUCOSE BLD-MCNC: 163 MG/DL (ref 74–124)
HCT VFR BLD AUTO: 38.5 % (ref 34–46.6)
HGB BLD-MCNC: 11.8 G/DL (ref 12–15.9)
IMM GRANULOCYTES # BLD AUTO: 0.01 10*3/MM3 (ref 0–0.05)
IMM GRANULOCYTES NFR BLD AUTO: 0.2 % (ref 0–0.5)
IRON 24H UR-MRATE: 31 MCG/DL (ref 37–145)
IRON SATN MFR SERPL: 10 % (ref 14–48)
LYMPHOCYTES # BLD AUTO: 0.72 10*3/MM3 (ref 0.7–3.1)
LYMPHOCYTES NFR BLD AUTO: 12.9 % (ref 19.6–45.3)
MCH RBC QN AUTO: 27 PG (ref 26.6–33)
MCHC RBC AUTO-ENTMCNC: 30.6 G/DL (ref 31.5–35.7)
MCV RBC AUTO: 88.1 FL (ref 79–97)
MONOCYTES # BLD AUTO: 0.33 10*3/MM3 (ref 0.1–0.9)
MONOCYTES NFR BLD AUTO: 5.9 % (ref 5–12)
NEUTROPHILS # BLD AUTO: 4.34 10*3/MM3 (ref 1.7–7)
NEUTROPHILS NFR BLD AUTO: 77.6 % (ref 42.7–76)
NRBC BLD AUTO-RTO: 0 /100 WBC (ref 0–0.2)
PLATELET # BLD AUTO: 347 10*3/MM3 (ref 140–450)
PMV BLD AUTO: 8.2 FL (ref 6–12)
POTASSIUM BLD-SCNC: 4.4 MMOL/L (ref 3.5–4.7)
PROT SERPL-MCNC: 7.3 G/DL (ref 6.3–8)
RBC # BLD AUTO: 4.37 10*6/MM3 (ref 3.77–5.28)
SODIUM BLD-SCNC: 139 MMOL/L (ref 134–145)
TIBC SERPL-MCNC: 319 MCG/DL (ref 249–505)
TRANSFERRIN SERPL-MCNC: 228 MG/DL (ref 200–360)
WBC NRBC COR # BLD: 5.59 10*3/MM3 (ref 3.4–10.8)

## 2020-01-22 PROCEDURE — 84466 ASSAY OF TRANSFERRIN: CPT | Performed by: INTERNAL MEDICINE

## 2020-01-22 PROCEDURE — 96368 THER/DIAG CONCURRENT INF: CPT | Performed by: INTERNAL MEDICINE

## 2020-01-22 PROCEDURE — 83540 ASSAY OF IRON: CPT | Performed by: INTERNAL MEDICINE

## 2020-01-22 PROCEDURE — 99215 OFFICE O/P EST HI 40 MIN: CPT | Performed by: INTERNAL MEDICINE

## 2020-01-22 PROCEDURE — 96375 TX/PRO/DX INJ NEW DRUG ADDON: CPT | Performed by: INTERNAL MEDICINE

## 2020-01-22 PROCEDURE — 82378 CARCINOEMBRYONIC ANTIGEN: CPT | Performed by: INTERNAL MEDICINE

## 2020-01-22 PROCEDURE — 96411 CHEMO IV PUSH ADDL DRUG: CPT | Performed by: INTERNAL MEDICINE

## 2020-01-22 PROCEDURE — 80053 COMPREHEN METABOLIC PANEL: CPT

## 2020-01-22 PROCEDURE — 96413 CHEMO IV INFUSION 1 HR: CPT | Performed by: INTERNAL MEDICINE

## 2020-01-22 PROCEDURE — 96415 CHEMO IV INFUSION ADDL HR: CPT | Performed by: INTERNAL MEDICINE

## 2020-01-22 PROCEDURE — 82728 ASSAY OF FERRITIN: CPT | Performed by: INTERNAL MEDICINE

## 2020-01-22 PROCEDURE — 25010000002 PALONOSETRON PER 25 MCG: Performed by: INTERNAL MEDICINE

## 2020-01-22 PROCEDURE — 25010000002 LEUCOVORIN CALCIUM PER 50 MG: Performed by: INTERNAL MEDICINE

## 2020-01-22 PROCEDURE — 25010000002 FLUOROURACIL PER 500 MG: Performed by: INTERNAL MEDICINE

## 2020-01-22 PROCEDURE — 25010000002 DEXAMETHASONE SODIUM PHOSPHATE 100 MG/10ML SOLUTION: Performed by: INTERNAL MEDICINE

## 2020-01-22 PROCEDURE — 96416 CHEMO PROLONG INFUSE W/PUMP: CPT | Performed by: INTERNAL MEDICINE

## 2020-01-22 PROCEDURE — 25010000003 HEPARIN LOCK FLUCH PER 10 UNITS: Performed by: INTERNAL MEDICINE

## 2020-01-22 PROCEDURE — 36598 INJ W/FLUOR EVAL CV DEVICE: CPT

## 2020-01-22 PROCEDURE — 36415 COLL VENOUS BLD VENIPUNCTURE: CPT | Performed by: INTERNAL MEDICINE

## 2020-01-22 PROCEDURE — 85025 COMPLETE CBC W/AUTO DIFF WBC: CPT

## 2020-01-22 PROCEDURE — 0 IOPAMIDOL PER 1 ML: Performed by: INTERNAL MEDICINE

## 2020-01-22 PROCEDURE — 25010000002 OXALIPLATIN PER 0.5 MG: Performed by: INTERNAL MEDICINE

## 2020-01-22 RX ORDER — FERROUS SULFATE 325(65) MG
325 TABLET ORAL
Qty: 90 TABLET | Refills: 2 | Status: SHIPPED | OUTPATIENT
Start: 2020-01-22 | End: 2020-02-21

## 2020-01-22 RX ORDER — DEXTROSE MONOHYDRATE 50 MG/ML
250 INJECTION, SOLUTION INTRAVENOUS ONCE
Status: CANCELLED | OUTPATIENT
Start: 2020-01-22

## 2020-01-22 RX ORDER — DIPHENHYDRAMINE HYDROCHLORIDE 50 MG/ML
50 INJECTION INTRAMUSCULAR; INTRAVENOUS AS NEEDED
Status: CANCELLED | OUTPATIENT
Start: 2020-01-22

## 2020-01-22 RX ORDER — FLUOROURACIL 50 MG/ML
400 INJECTION, SOLUTION INTRAVENOUS ONCE
Status: CANCELLED | OUTPATIENT
Start: 2020-01-22

## 2020-01-22 RX ORDER — SODIUM CHLORIDE 0.9 % (FLUSH) 0.9 %
10 SYRINGE (ML) INJECTION AS NEEDED
Status: CANCELLED | OUTPATIENT
Start: 2020-01-22

## 2020-01-22 RX ORDER — FLUOROURACIL 50 MG/ML
400 INJECTION, SOLUTION INTRAVENOUS ONCE
Status: COMPLETED | OUTPATIENT
Start: 2020-01-22 | End: 2020-01-22

## 2020-01-22 RX ORDER — HEPARIN SODIUM (PORCINE) LOCK FLUSH IV SOLN 100 UNIT/ML 100 UNIT/ML
500 SOLUTION INTRAVENOUS AS NEEDED
Status: CANCELLED | OUTPATIENT
Start: 2020-01-22

## 2020-01-22 RX ORDER — FAMOTIDINE 10 MG/ML
20 INJECTION, SOLUTION INTRAVENOUS AS NEEDED
Status: CANCELLED | OUTPATIENT
Start: 2020-01-22

## 2020-01-22 RX ORDER — PALONOSETRON 0.05 MG/ML
0.25 INJECTION, SOLUTION INTRAVENOUS ONCE
Status: COMPLETED | OUTPATIENT
Start: 2020-01-22 | End: 2020-01-22

## 2020-01-22 RX ORDER — DEXTROSE MONOHYDRATE 50 MG/ML
250 INJECTION, SOLUTION INTRAVENOUS ONCE
Status: COMPLETED | OUTPATIENT
Start: 2020-01-22 | End: 2020-01-22

## 2020-01-22 RX ORDER — SODIUM CHLORIDE 0.9 % (FLUSH) 0.9 %
10 SYRINGE (ML) INJECTION AS NEEDED
Status: DISCONTINUED | OUTPATIENT
Start: 2020-01-22 | End: 2020-01-23 | Stop reason: HOSPADM

## 2020-01-22 RX ORDER — HEPARIN SODIUM (PORCINE) LOCK FLUSH IV SOLN 100 UNIT/ML 100 UNIT/ML
500 SOLUTION INTRAVENOUS AS NEEDED
Status: DISCONTINUED | OUTPATIENT
Start: 2020-01-22 | End: 2020-01-23 | Stop reason: HOSPADM

## 2020-01-22 RX ORDER — PALONOSETRON 0.05 MG/ML
0.25 INJECTION, SOLUTION INTRAVENOUS ONCE
Status: CANCELLED | OUTPATIENT
Start: 2020-01-22

## 2020-01-22 RX ADMIN — DEXAMETHASONE SODIUM PHOSPHATE 12 MG: 10 INJECTION, SOLUTION INTRAMUSCULAR; INTRAVENOUS at 12:14

## 2020-01-22 RX ADMIN — PALONOSETRON HYDROCHLORIDE 0.25 MG: 0.25 INJECTION, SOLUTION INTRAVENOUS at 12:12

## 2020-01-22 RX ADMIN — FLUOROURACIL 830 MG: 50 INJECTION, SOLUTION INTRAVENOUS at 14:39

## 2020-01-22 RX ADMIN — IOPAMIDOL 11 ML: 755 INJECTION, SOLUTION INTRAVENOUS at 10:35

## 2020-01-22 RX ADMIN — LEUCOVORIN CALCIUM 830 MG: 350 INJECTION, POWDER, LYOPHILIZED, FOR SOLUTION INTRAMUSCULAR; INTRAVENOUS at 12:33

## 2020-01-22 RX ADMIN — FLUOROURACIL 4970 MG: 50 INJECTION, SOLUTION INTRAVENOUS at 14:39

## 2020-01-22 RX ADMIN — Medication 10 ML: at 10:45

## 2020-01-22 RX ADMIN — DEXTROSE MONOHYDRATE 250 ML: 5 INJECTION, SOLUTION INTRAVENOUS at 12:12

## 2020-01-22 RX ADMIN — Medication 500 UNITS: at 10:45

## 2020-01-22 RX ADMIN — OXALIPLATIN 175 MG: 5 INJECTION, SOLUTION, CONCENTRATE INTRAVENOUS at 12:33

## 2020-01-22 NOTE — PROGRESS NOTES
Pt had activase left in port to dwell on Friday. Accessed port per protocol. Able to get about 1cc of activase out. Unable to obtain BR. Spoke with Dr. Nguyen and per his or pt to have a dye study prior to chemo today. VASU Morin to place order and Nisreen calling to set up appt. Informed pt and she V/U.

## 2020-01-22 NOTE — PROGRESS NOTES
Reviewed iron labs with Dr. Nguyen, order for ferrous sulfate 3 times per day. Informed pt. Order sent to pharmacy

## 2020-01-22 NOTE — PROGRESS NOTES
REASON FOR FOLLOW UP:     1. Newly diagnosed rectal cancer, rectal ultrasound examination reported T3N0 disease without lymphadenopathy. She does have small pulmonary nodule 6-7 mm and 2 mm with indeterminate feature. There is no solid evidence of metastatic disease otherwise. Patient has stage IIA (T3N0M0) disease.  2. The patient is heterozygous factor V Leiden, currently on prophylactic anticoagulation with Lovenox 40 mg daily given her increased risk of thrombosis with MediPort and GI malignancy.   3.  The 8 mm hypermetabolic right upper lobe pulmonary nodule is suspicious for metastatic as well.    4.  Patient had a PowerPort placement on the left upper chest by Dr. Joseph on 8/9/2019.  5.  Patient was started on concurrent infusional 5-FU chemoradiation therapy on 8/12/2019, with planned complete surgical resection and further adjuvant chemotherapy with intention to cure the disease.   6. Patient underwent surgical resection of the colon on 12/2/2019 with Dr. Ye  7. Diarrhea related to therapy and radiation.   8. Patient here today in anticipation of cycle 1 day 1 of adjuvant FOLFOX 6.    HISTORY OF PRESENT ILLNESS:  The patient is a 39 y.o. year old female here today for 3-month re-evaluation in anticipation of cycle #1 adjuvant FOLFOX 6.  The patient completed her concurrent 5-FU chemoradiation therapy on 9/19/2019.  The patient is accompanied by her mother today, who helped with the history.    She reports that she had a right ureteral stent placed following a lithotripsy done on 10/30/2019 for a right kidney stone.  The stent has since been removed.  The patient then underwent surgical resection of the rectum primary tumor and diverting loop ileostomy on 12/2/2019 with Dr. Ye.   She received 2 units of blood on 12/4/2019.     Patient is still symptomatic with some abdominal pain, dysuria, and difficulty with urination.  She also reports having mucous secretion from the rear end.  She reports  "having liquid stools in her colostomy bag, which she describes as \"mucous\" bowel movements.  She reports her liquid stools typically appear \"clear mustard yellow\" or dark.  She denies fever, sweating, or chills.    She is on full dose Xarelto anticoagulation.  Patient reports no further chest pain or dyspnea.  She denies bleeding or bruising.    She has taken iron supplements in the past and without constipation or diarrhea with taking the oral iron.    Laboratory studies today, 1/22/2020, show mild anemia hemoglobin 11.8, MCV 80.1 MCHC 30.6, normal platelets 347,000 and a WBC 5590 including ANC 4340.     Her performance that is ECOG 1.  She still works full-time in physician office.      Past Medical History:   Diagnosis Date   • Abnormal Pap smear of cervix 02/02/1998    JULIUS I   • Anemia in pregnancy    • Anxiety    • Cervical lymphadenitis 06/06/2012    SEEN AT [facility]   • Chronic diarrhea    • Colon polyps     FOLLOWED BY DR. GERONIMO ESPARZA   • Depression    • Factor V Leiden (CMS/[facility])     DX 8-2-2019   • GERD (gastroesophageal reflux disease)    • Heart murmur     ?   • Hematochezia    • Hemorrhoids    • History of chemotherapy 2019    FOLLOWED BY DR. ALEXANDRU HUNT   • History of gestational diabetes    • History of pre-eclampsia    • History of radiation therapy 2019    FOLLOWED BY DR. JAVON LEWIS   • History of TB skin testing 01/17/2009    TB Skin Test   • HPV in female 1998   • IBS (irritable bowel syndrome)    • Infected insect bite of neck 05/27/2016    SEEN AT [facility]   • Irritable bowel syndrome    • Kidney stones 08/09/2007    SEEN AT [facility]   • Lumbar disc disease    • On anticoagulant therapy    • PIH (pregnancy induced hypertension) 1998   • Pulmonary embolism (CMS/HCC) 09/20/2019    ADMITTED TO [facility]   • Rectal cancer (CMS/HCC) 07/08/2019    INVASIVE MODERATELY DIFFERENTIATED ADENOCARCINOMA, CHEMO AND XRT FINISHED 9/2019   • Right leg pain    • Right ureteral stone 10/01/2019    SEEN AT [facility]   • " SAB (spontaneous ) 1996     A2-1 INDUCED   • Sepsis due to cellulitis (CMS/HCC) 2002    LEFT GREAT TOE, ADMITTED TO [facility]     Past Surgical History:   Procedure Laterality Date   • CHOLECYSTECTOMY N/A 10/10/2003    LAPAROSCOPIC WITH CHOLANGIOGRAM, DR. JAMEY TALAVERA AT [facility]   • COLON RESECTION N/A 2019    Procedure: laparoscopic low anterior colon resection with mobilization of splenic flexure and diverting loop ileostomy: ERAS;  Surgeon: Geronimo Ye MD;  Location: [facility] MAIN OR;  Service: General   • COLONOSCOPY W/ POLYPECTOMY N/A 2019    15 MM TUBULOVILLOUS ADENOMA POLYP IN HEPATIC FLEXURE, 20 MMTUBULOVILLOUS ADENOMA WITH HIGH GRADE DYSPLASIA IN RECTOSIGMOID, 4 CM MASS IN MID RECTUM, PATH: INVASIVE MODERATELY DIFFERENTIATED ADENOCARCINOMA, DR. JENNIFER LI AT [facility]   • DILATATION AND EVACUATION N/A    • ENDOSCOPY N/A 2019    LA GRADE A ESOPHAGITIS, GASTRITIS, ALL BIOPSIES BENIGN, DR. JENNIFER LI AT [facility]   • PAP SMEAR N/A 2014   • SIGMOIDOSCOPY N/A 2019    INFILTRATIVE PARTIALLY OBSTRUCTING LARGE RECTAL CANCER, AREA TATOOED, DR. GERONIMO YE AT [facility]   • SIGMOIDOSCOPY N/A 2019    AN ULCERATED PARTIALLY OBSTRUCTING MASS IN MID RECTUM, AREA TATTOOED, DR. GERONIMO YE AT [facility]   • TRANSRECTAL ULTRASOUND N/A 2019    Procedure: ULTRASOUND TRANSRECTAL;  Surgeon: Geronimo Ye MD;  Location: [facility] ENDOSCOPY;  Service: General   • URETEROSCOPY LASER LITHOTRIPSY WITH STENT INSERTION Right 10/2019   • VAGINAL DELIVERY N/A 1998   • VENOUS ACCESS DEVICE (PORT) INSERTION Left 2019    Procedure: INSERTION VENOUS ACCESS DEVICE;  Surgeon: Sj Joseph MD;  Location: [facility] OR [facility];  Service: General   • WISDOM TOOTH EXTRACTION         HEMATOLOGIC/ONCOLOGIC HISTORY:      The patient reports she has intermittent rectal bleeding and urgency, mucous with her stool, starting sometime in . At that time she was  referred to GI service, and was diagnosed of irritable bowel syndrome. Nevertheless she had worsening urgency for bowel movement, and had a couple of incidents losing control of stool. She was recently seen by Roland Thorpe MD again and had colonoscopy and EGD exam on 07/08/2019. She was found having a circumferential rectal mass. According to the procedure note, the patient had a fungating circumferential bleeding 4 cm mass in the middle rectum, at distance between 13 cm and 17 cm from the anus. Mass was causing partial obstruction. There were also colon polyps found at the hepatic flexure and also at the rectosigmoid junction 23 cm from the anus. Both were resected and retrieved. EGD examination reported grade A esophagitis at the GE junction and patchy discontinuous erythema and aggravation of the mucosa without bleeding in the stomach body. There is normal mucosa of the duodenum. Pathology evaluation from this procedure reported moderately differentiated adenocarcinoma involving the rectal mass. The rectal sigmoid junction polyp was tubular/tubulovillous adenoma with high grade dysplasia negative for invasive malignancy. The hepatic flexure polyp was also tubular/tubulovillous adenoma negative for high grade dysplasia or malignancy. The biopsy from the esophagus reports squamocolumnar mucosa with inflammatory changes suggestive of mild reflux esophagitis, negative for interstitial metaplasia. Gastric biopsy was benign and duodenal biopsy was also benign. There is no mention of H-pylori.     Patient was subsequently referred to colorectal surgeon Padmaja Ye MD for further evaluation. The patient had CT scan examination for chest, abdomen and pelvis requested by Dr. Ye and were done on 07/13/2019. The study reported no evidence of lymphadenopathy in the abdomen and pelvis. There was wall thickening of the rectosigmoid junction. Normal spleen, pancreas, adrenal glands and kidneys. There was fatty liver  infiltration but no focal lymphatic lesions. There was a small 6-7 mm noncalcified nodule in the right upper lobe and a tiny 3 mm subpleural nodule in the right middle lobe. No mediastinal or hilar lymphadenopathy.     Dr. Ye performed staging rectal ultrasound examination. This study reported tumor penetrating through the muscularis propria as T3 disease; there were no lymph nodes identified.    She had a hospitalization in late September 2019 because of newly discovered pulmonary emboli.  She was on prophylactic Lovenox prior to the incident of PE.  Now she is on full dose Xarelto anticoagulation.  Patient reports no further chest pain dyspnea.  She denies bleeding or bruising.  During her hospitalization, she was seen by Dr. Ye, who plans to have surgery 8 to 12 weeks after finishing radiation therapy.  She finished her radiation on 9/19/2019.     I noticed patient also presented to the emergency room on 10/1/2019 complaining of right flank area pain.  I reviewed the images studies and indeed she has a very small 1 to 2 mm obstructing kidney stone in the UV junction.  Patient is still symptomatic with some pains and dysuria.  She denies fever sweating or chills.    Laboratory study on 10/7/2019 reported normal CBC including hemoglobin 13.1, platelets 301,000, WBC 6170 and ANC 4900 lymphocytes 590 monocytes 480.      The nurse reported malfunction of port-a-catheter on 10/7/2019.  Port study on 10/8/2019 showed fibrin sheath around the distal aspect of the Mediport catheter in the SVC. This does not appear to hinder injection, but does prevent aspiration at this time.    Patient underwent surgical resection of the colon on 12/2/2019 with Dr. Ye.  Pathology evaluation reported residual T3 disease, also 1 out of 14 lymph nodes positive for malignancy.  So this patient in either had at least stage IIIb disease (T3 N1 M0?).      Adjuvant chemotherapy FOLFOX 6 will be started on  1/22/2020.      MEDICATIONS    Current Outpatient Medications:   •  clonazePAM (KlonoPIN) 1 MG tablet, Take 1 tablet by mouth 3 (Three) Times a Day As Needed for Anxiety or Seizures., Disp: 90 tablet, Rfl: 1  •  escitalopram (LEXAPRO) 20 MG tablet, Take 1 tablet by mouth Daily., Disp: 90 tablet, Rfl: 3  •  famotidine (PEPCID) 20 MG tablet, TAKE 1 TABLET BY MOUTH TWICE A DAY, Disp: 60 tablet, Rfl: 0  •  HYDROcodone-acetaminophen (NORCO) 7.5-325 MG per tablet, Take 1 tablet by mouth Every 4 (Four) Hours As Needed for Moderate Pain ., Disp: 180 tablet, Rfl: 0  •  metoprolol tartrate (LOPRESSOR) 25 MG tablet, TAKE 1/2 TABLET BY MOUTH EVERY 12 (TWELVE) HOURS., Disp: 60 tablet, Rfl: 1  •  ondansetron (ZOFRAN) 8 MG tablet, Take 1 tablet by mouth 3 (Three) Times a Day As Needed for Nausea or Vomiting., Disp: 60 tablet, Rfl: 5  •  phenazopyridine (PYRIDIUM) 100 MG tablet, Take 1 tablet by mouth 3 (Three) Times a Day As Needed for Bladder Spasms., Disp: 120 tablet, Rfl: 1  •  rivaroxaban (XARELTO) 20 MG tablet, Take 1 tablet by mouth Daily. Start after finishing starter pack. (Patient taking differently: Take 20 mg by mouth Daily. PT HOLDING 48 HOURS PRIOR TO SURGERY), Disp: 30 tablet, Rfl: 11    ALLERGIES:   No Known Allergies    SOCIAL HISTORY:       Social History     Socioeconomic History   • Marital status:      Spouse name: Jutsus   • Number of children: 1   • Years of education: Not on file   • Highest education level:  College   Occupational History     Employer: [employer]   Tobacco Use   • Smoking status: Current Every Day Smoker     Packs/day: 1.50     Years: 20.00     Pack years: 30.00     Types: Electronic Cigarette   • Smokeless tobacco: Never Used   Substance and Sexual Activity   • Alcohol use: Yes     Comment: Rarely   • Drug use: No   • Sexual activity: Defer         FAMILY HISTORY:   Mother has positive factor V Leiden mutation, although she did not have thrombosis, mother also is coronary disease  "with stenting, she also is occasional bruising.  Maternal grandmother had DVT, she had multiple surgical procedures.  Patient mother's half-brother had metastatic colon cancer at diagnosis in his 50s.  Maternal great aunt has breast cancer.  Patient will follow his skin cancer in his 60s, exclusive.    REVIEW OF SYSTEMS:  Review of Systems   Constitutional: Positive for unexpected weight change (See HPI ). Negative for appetite change, chills, diaphoresis, fatigue and fever.   HENT: Negative for congestion, hearing loss, mouth sores, rhinorrhea, sore throat and trouble swallowing.    Eyes: Negative for visual disturbance.   Respiratory: Negative for cough, chest tightness, shortness of breath and wheezing.    Cardiovascular: Negative for chest pain, palpitations and leg swelling.   Gastrointestinal: Positive for abdominal pain and diarrhea (See HPI ). Negative for abdominal distention, anal bleeding, constipation, nausea and vomiting.        See HPI    Endocrine: Negative for cold intolerance.   Genitourinary: Positive for difficulty urinating and dysuria. Negative for frequency, hematuria and urgency.   Musculoskeletal: Negative for arthralgias, back pain and joint swelling.   Skin: Negative.  Negative for rash and wound.   Allergic/Immunologic: Negative for immunocompromised state.   Neurological: Negative for dizziness, seizures, syncope, speech difficulty, weakness, numbness and headaches.   Hematological: Negative for adenopathy. Does not bruise/bleed easily.   Psychiatric/Behavioral: Negative for agitation, behavioral problems, confusion and suicidal ideas.          Vitals:    01/22/20 0834   BP: 139/89   Pulse: 120   Resp: 16   Temp: 98.4 °F (36.9 °C)   TempSrc: Oral   SpO2: 95%   Weight: 96.8 kg (213 lb 4.8 oz)   Height: 166 cm (65.35\")   PainSc: 0-No pain     Current Status 1/22/2020   ECOG score 0      PHYSICAL EXAM:    GENERAL:  Well-developed, well-nourished  female in no acute distress.   SKIN: "  Warm, dry without rashes, purpura or petechiae.  EYES:  Pupils equal, round and reactive to light.  EOMs intact.  Conjunctivae normal.  EARS:  Hearing intact.  NOSE:  No nasal discharge.  MOUTH:  Tongue is well-papillated; no stomatitis or ulcers.  Lips normal.  THROAT:  Oropharynx without lesions or exudates.  NECK:  Supple with good range of motion; no thyromegaly or masses.  LYMPHATICS:  No cervical, supraclavicular adenopathy.  CHEST:  Lungs clear to auscultation. Good airflow.  CARDIAC:  Regular rate and rhythm without murmurs, rubs or gallops. Normal S1,S2.  ABDOMEN:  Soft, nontender with no hepatosplenomegaly or masses. Bowel sounds normal. Colostomy bag present in the right lower quadrant, with some brown-colored liquid present.  EXTREMITIES:  No clubbing, cyanosis or edema.  NEUROLOGICAL:  Cranial Nerves II-XII grossly intact.  No focal neurological deficits.  PSYCHIATRIC:  Normal affect and mood.      RECENT LABS:  Lab Results   Component Value Date    WBC 5.59 01/22/2020    HGB 11.8 (L) 01/22/2020    HCT 38.5 01/22/2020    MCV 88.1 01/22/2020     01/22/2020     Lab Results   Component Value Date    NEUTROABS 4.34 01/22/2020     Lab Results   Component Value Date    CEA 18.21 08/01/2019     Glucose   Date Value Ref Range Status   01/22/2020 163 (H) 74 - 124 mg/dL Final     BUN   Date Value Ref Range Status   01/22/2020 5 (L) 6 - 20 mg/dL Final     Creatinine   Date Value Ref Range Status   01/22/2020 0.76 0.60 - 1.10 mg/dL Final     Sodium   Date Value Ref Range Status   01/22/2020 139 134 - 145 mmol/L Final     Potassium   Date Value Ref Range Status   01/22/2020 4.4 3.5 - 4.7 mmol/L Final     Chloride   Date Value Ref Range Status   01/22/2020 101 98 - 107 mmol/L Final     CO2   Date Value Ref Range Status   01/22/2020 26.1 22.0 - 29.0 mmol/L Final     Calcium   Date Value Ref Range Status   01/22/2020 9.3 8.5 - 10.2 mg/dL Final     Total Protein   Date Value Ref Range Status   01/22/2020 7.3 6.3  - 8.0 g/dL Final     Albumin   Date Value Ref Range Status   01/22/2020 3.50 3.50 - 5.20 g/dL Final     ALT (SGPT)   Date Value Ref Range Status   01/22/2020 36 (H) 0 - 33 U/L Final     AST (SGOT)   Date Value Ref Range Status   01/22/2020 29 0 - 32 U/L Final     Alkaline Phosphatase   Date Value Ref Range Status   01/22/2020 83 38 - 116 U/L Final     Total Bilirubin   Date Value Ref Range Status   01/22/2020 0.2 0.2 - 1.2 mg/dL Final     eGFR Non  Amer   Date Value Ref Range Status   01/22/2020 84 >60 mL/min/1.73 Final     BUN/Creatinine Ratio   Date Value Ref Range Status   01/22/2020 6.6 (L) 7.3 - 30.0 Final     Anion Gap   Date Value Ref Range Status   01/22/2020 11.9 5.0 - 15.0 mmol/L Final     PATHOLOGY :   Case Report   Surgical Pathology Report                         Case: [id number]                                   Authorizing Provider:  Padmaja Ye MD        Collected:           12/02/2019 11:56 AM           Ordering Location:     [facility]  Received:            12/02/2019 02:18 PM                                  MAIN OR                                                                       Pathologist:           Jania Perez MD                                                           Specimens:   1) - Large Intestine, Sigmoid Colon, SIGMOID AND RECTUM                                              2) - Large Intestine, Sigmoid Colon, proximal anastamotic ring                                       3) - Large Intestine, Sigmoid Colon, distal anastamotic ring                               Final Diagnosis   1. Sigmoid Colon and Rectum, Low Anterior Resection S/P Neoadjuvant Therapy:                A. INVASIVE MODERATELY DIFFERENTIATED ADENOCARCINOMA.               B. Tumor invades through the muscularis propria into elijah-colorectal tissue.               C. Treatment effect is present (Partial Response, Tumor Regression Score 2).                D. Margins are negative for  tumor; Closest distance: 1.5 cm from the circumferential radial margin.                E. Negative for lymphovascular space invasion.               F. Single focus of perineural invasion.                G. ONE OF FOURTEEN LYMPH NODES, POSITIVE FOR CARCINOMA (1/14).                H. See Synoptic Report and Comment.      2. Sigmoid Colon, Proximal Anastomotic Ring, Resection:                A. Portion of colon with no significant histopathologic changes.     3. Sigmoid Colon, Distal Anastomotic Ring, Resection:               A. Portion of colon with no significant histopathologic changes.          RECENT IMAGING            Assessment/Plan    1.  Newly diagnosed rectal cancer, rectal ultrasound examination reported T3N0 disease without lymphadenopathy. CT scan of chest, abdomen and pelvis reported no lymphadenopathy in the abdomen, pelvis or chest. She does have small pulmonary nodule 6-7 mm and 2 mm with indeterminate feature. There is no solid evidence of metastatic disease otherwise.   · Based on the CT scan and rectal ultrasound imaging studies, this patient had stage IIA (T3N0M0) disease.    · She had PET scan examination on 8/8/2019 which reported a hypermetabolic right upper lobe pulmonary nodule 6 mm with SUV 5.6.  This is suspicious for metastatic disease.  This patient may have stage IV disease.   · He initiated concurrent radiation with continuous 5-FU on 8/12/2019.  · Patient finished concurrent chemoradiation therapy.  · Patient underwent surgical resection of the colon and diverting loop ileostomy on 12/2/2019 with Dr. Ye.  Pathology evaluation reported residual T3 disease, with 1 out of 14 lymph nodes positive for malignancy.  Certainly this patient has at least stage IIIb rectal cancer (T3 N1 M0?)  · Discussed with the patient today on 1/22/2020, will start her on adjuvant chemotherapy FOLFOX 6 regimen.  We will continue to monitor her tumor marker.     2 .  Pulmonary emboli with background of  "positive factor V Leiden mutation   · The patient has heterozygous factor V Leiden mutation and therefore was on low-dose anticoagulation with Lovenox, 40 mg daily given her increased risk of thrombosis with MediPort and GI malignancy.    · Nevertheless this patient was found to having pulmonary emboli on 9/20/2019 despite low-dose Lovenox.  She had a brief hospitalization in late September 2019, she was started on full dose Xarelto anticoagulation per protocol.  She is tolerating well, will continue same treatment.    3.  Kidney stone with mild right hydronephrosis.  Patient presented to the emergency room on 10/1/2019 complaining of right flank pain.  CT scan examination reported a small 1 to 2 mm obstructing calculus of the right ureter UV junction.  Patient continues to have some symptomology.  Patient has been referred to urology for further evaluation.    4. Loose, frequent stools:   · Patient is s/p surgical resection of the rectum tumor and diverting loop ileostomy on 12/2/2019 with Dr. Ye.  · She reports having liquid stools in her colostomy bag.  She reports her liquid stools typically appear \"clear mustard yellow\" or dark.  · Continue to monitor.      5. Pelvic pain: Related to previous radiation therapy and the recent surgery for resection of the primary rectal cancer.  Stable at this point.  We will continue to monitor.    6. Hypokalemia: She is currently taking 20 mg of potassium every other day.    · Her potassium is 4.4 today on 1/22/2020.     7.  This patient has also strong family history for malignancy the patient had appointment with genetic counseling on September 3, 2019.    8.  Nausea: This does seem to be multifactorial with a large part due to anxiety.  The patient does continue on Lexapro at 20 mg daily as well as Klonopin 0.5 mg once daily as needed.  She is able to take this up to 2 times daily as needed and will try this over this next week.    · Patient has improved symptomology.  " Continue current management.      9.  Mild anemia, improved since her surgery.  She also has a history of iron deficiency.  Will repeat iron study today.     10.  Nurse reported malfunction of the portacatheter on 10/7/2019.  Saline can be injected, however no blood withdrawal.    · Port study on 10/8/2019 showed fibrin sheath around the distal aspect of the Mediport catheter in the SVC. This does not appear to hinder injection, but does prevent aspiration at this time.     PLAN:  1. We will get a dye study for the PowerPort today to evaluate the port functioning (radiologist called, reporting the power is usable for chemotherapy, it does have a fibrin sheath outside the, preventing blood drawn).  2. She will receive cycle 1 of FOLFOX 6 today  3. She will continue Xarelto full dose anticoagulation.  4. She will continue her current regimen of antidiarrheal and increase her nutrition using protein shakes.  5. Continue on Pepcid 20 mg twice daily.  6. Patient will continue follow-up with Dr. Ye.   7. Patient also see nurse practitioner in 2 weeks, prior to cycle #2 FOLFOX 6 chemotherapy.    8. We will arrange for the patient to come back to see me in 4 weeks prior to cycle #3 chemotherapy.  9. I have asked her to call our office with any concerns prior to her next office visit. She has v/u.    By signing my name here, I Gordon Canas, attest that all documentation on 01/22/20 at 9:27 AM has been prepared under the direction and in the presence of Dr. Dong Nguyen MD.    I reviewed the note scribed by Gordon Canas and made appropriate corrections.       Addendum:   Lab Results   Component Value Date    IRON 31 (L) 01/22/2020    TIBC 319 01/22/2020    FERRITIN 168.10 01/22/2020   Iron saturation 10%     Lab Results   Component Value Date    CEA 0.84 01/22/2020       Patient has evidence of iron deficiency.  Will start on oral iron ferrous sulfate 325 mg 3 times a day.  If not tolerating, will switch to IV  iron therapy.      ALEXANDRU HUNT M.D., Ph.D.         CC:   MD Dr. Leyda Rizo MD Dr. Carole Scharf, MD Dr. Neil Prendergast, MD

## 2020-01-23 ENCOUNTER — TELEPHONE (OUTPATIENT)
Dept: ONCOLOGY | Facility: HOSPITAL | Age: 40
End: 2020-01-23

## 2020-01-23 NOTE — TELEPHONE ENCOUNTER
Called pt to see how she was doing following first folfox treatment yesterday. Pt states she is doing well, experiencing cold sensitivity symptoms but they resolve quickly. States chemo ball is decreasing in size. Pt is aware of appt tomorrow for chemo unhook and to call for any concerns.

## 2020-01-24 ENCOUNTER — INFUSION (OUTPATIENT)
Dept: ONCOLOGY | Facility: HOSPITAL | Age: 40
End: 2020-01-24

## 2020-01-24 DIAGNOSIS — Z45.2 ENCOUNTER FOR FITTING AND ADJUSTMENT OF VASCULAR CATHETER: ICD-10-CM

## 2020-01-24 DIAGNOSIS — C20 ADENOCARCINOMA OF RECTUM (HCC): Primary | ICD-10-CM

## 2020-01-24 PROCEDURE — 25010000003 HEPARIN LOCK FLUCH PER 10 UNITS: Performed by: INTERNAL MEDICINE

## 2020-01-24 RX ORDER — SODIUM CHLORIDE 0.9 % (FLUSH) 0.9 %
10 SYRINGE (ML) INJECTION AS NEEDED
Status: CANCELLED | OUTPATIENT
Start: 2020-01-24

## 2020-01-24 RX ORDER — HEPARIN SODIUM (PORCINE) LOCK FLUSH IV SOLN 100 UNIT/ML 100 UNIT/ML
500 SOLUTION INTRAVENOUS AS NEEDED
Status: DISCONTINUED | OUTPATIENT
Start: 2020-01-24 | End: 2020-01-24 | Stop reason: HOSPADM

## 2020-01-24 RX ORDER — SODIUM CHLORIDE 0.9 % (FLUSH) 0.9 %
10 SYRINGE (ML) INJECTION AS NEEDED
Status: DISCONTINUED | OUTPATIENT
Start: 2020-01-24 | End: 2020-01-24 | Stop reason: HOSPADM

## 2020-01-24 RX ORDER — HEPARIN SODIUM (PORCINE) LOCK FLUSH IV SOLN 100 UNIT/ML 100 UNIT/ML
500 SOLUTION INTRAVENOUS AS NEEDED
Status: CANCELLED | OUTPATIENT
Start: 2020-01-24

## 2020-01-24 RX ADMIN — Medication 500 UNITS: at 12:53

## 2020-01-24 RX ADMIN — SODIUM CHLORIDE, PRESERVATIVE FREE 10 ML: 5 INJECTION INTRAVENOUS at 12:53

## 2020-01-28 ENCOUNTER — OFFICE VISIT (OUTPATIENT)
Dept: SURGERY | Facility: CLINIC | Age: 40
End: 2020-01-28

## 2020-01-28 VITALS
SYSTOLIC BLOOD PRESSURE: 156 MMHG | OXYGEN SATURATION: 97 % | HEIGHT: 66 IN | BODY MASS INDEX: 33.3 KG/M2 | WEIGHT: 207.2 LBS | TEMPERATURE: 97.6 F | DIASTOLIC BLOOD PRESSURE: 96 MMHG | HEART RATE: 100 BPM

## 2020-01-28 DIAGNOSIS — Z79.01 ANTICOAGULATED: ICD-10-CM

## 2020-01-28 DIAGNOSIS — C20 RECTAL CANCER (HCC): Primary | ICD-10-CM

## 2020-01-28 PROCEDURE — 99024 POSTOP FOLLOW-UP VISIT: CPT | Performed by: COLON & RECTAL SURGERY

## 2020-01-28 NOTE — PROGRESS NOTES
"Carole Weaver is a 39 y.o. female in for follow up of Rectal cancer (CMS/HCC)    Anticoagulated  s/p laparoscopic low anterior colon resection with diverting loop ileostomy 12/2/2019    Pt states she is having lots of difficulty with chemo  Pt c/o severe nausea with chemo the day after the chemo ball was unhooked and po Fe supplement.  She d/c'ed Fe supplement due to abdominal pain    She states she gets up every 2 hours to have mucus out of her bottom, as well as urinate  She states she has mucus BMs 20 times per day  Lots of pressure in her rectum  Very slight tinge of blood on the toilet paper    She empties her bag every couple of hours, even if it is not full  Consistency fluctuates from liquid to pasty  She is not taking imodium  Will have episodes with a lot of ostomy green output with cramping.    She is taking rx pain medication, because it is the only thing that provides relief      /96 (BP Location: Left arm, Patient Position: Sitting, Cuff Size: Adult)   Pulse 100   Temp 97.6 °F (36.4 °C) (Oral)   Ht 167.6 cm (66\")   Wt 94 kg (207 lb 3.2 oz)   LMP  (LMP Unknown)   SpO2 97%   Breastfeeding No   BMI 33.44 kg/m²   Body mass index is 33.44 kg/m².      PE:  Physical Exam   Constitutional: She appears well-developed. No distress.   tearful when discussing symptoms   HENT:   Head: Normocephalic and atraumatic.   Abdominal:   -s/nt/nd  -midline vertical incision mostly healed in; small opening proximal incision.  Re-packed with quarter inch gauze  -stoma pink with appliance in place   Musculoskeletal: Normal range of motion.   Neurological: She is alert.   Psychiatric: Thought content normal.       Assessment:   1. Rectal cancer (CMS/HCC)    2. Anticoagulated    s/p laparoscopic low anterior colon resection with diverting loop ileostomy 12/2/2019    Plan:    Extensive discussion with pt regarding rectal drainage.  Could be permanent, or could improve once she is in continuity.  Will need to wait " until she finishes chemo to investigate with GGE or flexible sigmoidoscopy.  Has some element of IBS with the cramping.    She should continue packing incision until she can no longer pack.    RTC 3 weeks        Scribed for Padmaja Ye MD by Parul Rios PA-C  1/28/2020   This patient was evaluated by me, recommendations made, documentation reviewed, edited, and revised by me, Padmaja Ye MD

## 2020-01-29 ENCOUNTER — OFFICE VISIT (OUTPATIENT)
Dept: INTERNAL MEDICINE | Facility: CLINIC | Age: 40
End: 2020-01-29

## 2020-01-29 VITALS
BODY MASS INDEX: 32.82 KG/M2 | WEIGHT: 204.2 LBS | HEIGHT: 66 IN | HEART RATE: 97 BPM | TEMPERATURE: 98 F | OXYGEN SATURATION: 96 % | RESPIRATION RATE: 16 BRPM | DIASTOLIC BLOOD PRESSURE: 88 MMHG | SYSTOLIC BLOOD PRESSURE: 123 MMHG

## 2020-01-29 DIAGNOSIS — F33.9 MONOPOLAR DEPRESSION (HCC): ICD-10-CM

## 2020-01-29 DIAGNOSIS — I26.99 OTHER PULMONARY EMBOLISM WITHOUT ACUTE COR PULMONALE, UNSPECIFIED CHRONICITY (HCC): Primary | ICD-10-CM

## 2020-01-29 DIAGNOSIS — D68.51 HETEROZYGOUS FACTOR V LEIDEN MUTATION (HCC): ICD-10-CM

## 2020-01-29 DIAGNOSIS — G89.18 PAIN ASSOCIATED WITH SURGICAL PROCEDURE: ICD-10-CM

## 2020-01-29 DIAGNOSIS — C20 ADENOCARCINOMA OF RECTUM (HCC): ICD-10-CM

## 2020-01-29 DIAGNOSIS — F41.9 ANXIETY: ICD-10-CM

## 2020-01-29 PROCEDURE — 99214 OFFICE O/P EST MOD 30 MIN: CPT | Performed by: NURSE PRACTITIONER

## 2020-01-29 NOTE — PROGRESS NOTES
Name: Carole Weaver  :  1980    Subjective:      Chief Complaint   Patient presents with   • Cancer     Rectal - dx 2019   • Pain   • Establish Care        Carole Weaver is a 39 y.o. female prior patient of Minesh Leone MD. Dr Leone has now retired and she is here to establish care with me.  She has multiple chronic medical conditions including: IBS, Anxiety    Diarrhea started at 36 yoa was being treated for IBS.   Had colonoscopy just at 39 and adenocarcinoma at mid rectum.  She is now followed by Dr Ye and Dr Nguyen.     Rectal cancer dx 2019 , rectal ultrasound examination reported T3N0 disease without lymphadenopathy. She had radiation and 5-FU.  States she tolerated this well.  She underwent surgical resection of the colon and diverting loop ileostomy on 2019. She has now been on FOLFOX 6 and states this has been harder on her. More nausea and fatigue.  Stays in the house and reads books.      Pulmonary emboli on 2019 while being treated with  low-dose Lovenox.  She is postivie for factor V Leiden (her mother also positive). Currently on  Xarelto anticoagulation. She is tolerating well, no s/sx bleeding.    Her biggest concern is pain.  She states it is near her rectum. Burning and aching.  6-8 out of 10 most of the day. Dr Leone started her on Norco and she states it has provided significant relief. States she takes about every 6 hours.  States she was originally taking them every 4 hours.      She has iron deficiency anemia.  She is currently on iron by Dr Nguyen.  States it was making her ill when she took the supplement but is doing better now.     She states she had a break down when at Dr Ye's office this week but after discussion, she is doing better.  States klonopin helps.  She continues lexapro.     Port is not working for return blood but infusing well.  May need to replace.    I have reviewed the patient's medical history in detail and updated the  computerized patient record.    Past Medical History:   Diagnosis Date   • Abnormal Pap smear of cervix 1998    JULIUS I   • Anemia in pregnancy    • Anxiety    • Cervical lymphadenitis 2012    SEEN AT [facility] ER   • Chronic diarrhea    • Colon polyps     FOLLOWED BY DR. GERONIMO YE   • Depression    • Factor V Leiden (CMS/[facility])     DX 2019   • GERD (gastroesophageal reflux disease)    • Heart murmur     ?   • Hematochezia    • Hemorrhoids    • History of chemotherapy     FOLLOWED BY DR. ALEXANDRU HUNT   • History of gestational diabetes    • History of pre-eclampsia    • History of radiation therapy     FOLLOWED BY DR. JAVON LEWIS   • History of TB skin testing 2009    TB Skin Test   • HPV in female    • IBS (irritable bowel syndrome)    • Infected insect bite of neck 2016    SEEN AT [facility]   • Irritable bowel syndrome    • Kidney stones 2007    SEEN AT [facility] ER   • Lumbar disc disease    • On anticoagulant therapy    • PIH (pregnancy induced hypertension)    • Pulmonary embolism (CMS/[facility]) 2019    ADMITTED TO [facility]   • Rectal cancer (CMS/HCC) 2019    INVASIVE MODERATELY DIFFERENTIATED ADENOCARCINOMA, CHEMO AND XRT FINISHED 2019   • Right leg pain    • Right ureteral stone 10/01/2019    SEEN AT [facility] ER   • SAB (spontaneous ) 1996     A2-1 INDUCED   • Sepsis due to cellulitis (CMS/HCC) 2002    LEFT GREAT TOE, ADMITTED TO [facility]       Past Surgical History:   Procedure Laterality Date   • CHOLECYSTECTOMY N/A 10/10/2003    LAPAROSCOPIC WITH CHOLANGIOGRAM, DR. JAMEY TALAVERA AT [facility]   • COLON RESECTION N/A 2019    Procedure: laparoscopic low anterior colon resection with mobilization of splenic flexure and diverting loop ileostomy: ERAS;  Surgeon: Geronimo Ye MD;  Location: [facility] OR;  Service: General   • COLONOSCOPY W/ POLYPECTOMY N/A 2019    15 MM TUBULOVILLOUS ADENOMA POLYP IN HEPATIC FLEXURE, 20 MMTUBULOVILLOUS  ADENOMA WITH HIGH GRADE DYSPLASIA IN RECTOSIGMOID, 4 CM MASS IN MID RECTUM, PATH: INVASIVE MODERATELY DIFFERENTIATED ADENOCARCINOMA, DR. JENNIFER LI AT [facility]   • DILATATION AND EVACUATION N/A 2009   • ENDOSCOPY N/A 07/08/2019    LA GRADE A ESOPHAGITIS, GASTRITIS, ALL BIOPSIES BENIGN, DR. JENNIFER IL AT [facility]   • PAP SMEAR N/A 01/24/2014   • SIGMOIDOSCOPY N/A 7/24/2019    INFILTRATIVE PARTIALLY OBSTRUCTING LARGE RECTAL CANCER, AREA TATOOED, DR. GERONIMO ESPARZA AT [facility]   • SIGMOIDOSCOPY N/A 11/23/2019    AN ULCERATED PARTIALLY OBSTRUCTING MASS IN MID RECTUM, AREA TATTOOED, DR. GERONIMO ESPARZA AT [facility]   • TRANSRECTAL ULTRASOUND N/A 7/24/2019    Procedure: ULTRASOUND TRANSRECTAL;  Surgeon: Geronimo Esparza MD;  Location: [facility] ENDOSCOPY;  Service: General   • URETEROSCOPY LASER LITHOTRIPSY WITH STENT INSERTION Right 10/2019   • VAGINAL DELIVERY N/A 09/18/1998   • VENOUS ACCESS DEVICE (PORT) INSERTION Left 8/9/2019    Procedure: INSERTION VENOUS ACCESS DEVICE;  Surgeon: Sj Joseph MD;  [place] [facility] [place] [facility]  Service: General   • WISDOM TOOTH EXTRACTION  1993       Family History   Problem Relation Age of Onset   • Hyperlipidemia Mother    • Colon polyps Mother    • Heart disease Mother    • Hypertension Mother    • Factor V Leiden deficiency Mother    • Skin cancer Father         Squamous in 60s   • Heart disease Paternal Grandfather    • No Known Problems Son    • Cancer Paternal Uncle    • Colon cancer Paternal Uncle    • Diabetes Paternal Uncle    • Mental illness Sister         Addiction   • Colon cancer Maternal Uncle    • Malig Hyperthermia Neg Hx        Social History     Socioeconomic History   • Marital status:      Spouse name: Justus   • Number of children: 1   • Years of education: College   • Highest education level: Not on file   Occupational History   • Occupation:      Comment: [employer]     Employer: [employer]   Tobacco Use   • Smoking  status: Former Smoker     Packs/day: 1.00     Years: 24.00     Pack years: 24.00     Types: Electronic Cigarette, Cigarettes     Last attempt to quit: 2019     Years since quittin.3   • Smokeless tobacco: Never Used   Substance and Sexual Activity   • Alcohol use: Yes     Comment: Rarely   • Drug use: No   • Sexual activity: Defer       Most Recent Immunizations   Administered Date(s) Administered   • flucelvax quad pfs =>4 YRS 2018         Review of Systems:   Review of Systems   Constitutional: Positive for appetite change.   Respiratory: Negative for shortness of breath.    Cardiovascular: Negative for chest pain, palpitations and leg swelling.   Gastrointestinal: Positive for abdominal pain.        Mucus rectal discharge   Endocrine: Negative.    Allergic/Immunologic: Positive for immunocompromised state.   Hematological: Bruises/bleeds easily.         Objective:      Physical Exam:   Physical Exam   Constitutional: She is oriented to person, place, and time. She appears well-developed. She is cooperative.   HENT:   Mouth/Throat: Oropharynx is clear and moist.   Cardiovascular: Normal rate, regular rhythm, normal heart sounds, intact distal pulses and normal pulses.   Pulmonary/Chest: Effort normal and breath sounds normal. She exhibits no deformity.   Equal, Unlabored   Abdominal: Soft. Bowel sounds are normal.   Ostomy    Musculoskeletal: Normal range of motion. She exhibits no edema.   Neurological: She is alert and oriented to person, place, and time.   Skin: Skin is warm and dry. Capillary refill takes 2 to 3 seconds.   Psychiatric: She has a normal mood and affect. Her behavior is normal. Judgment and thought content normal.   Vitals reviewed.        Vital Signs:  Vitals:    20 0831   BP: 123/88   BP Location: Left arm   Patient Position: Sitting   Cuff Size: Small Adult   Pulse: 97   Resp: 16   Temp: 98 °F (36.7 °C)   TempSrc: Oral   SpO2: 96%   Weight: 92.6 kg (204 lb 3.2 oz)  "  Height: 167.6 cm (66\")     Body mass index is 32.96 kg/m².      Results Review:      REVIEWED AND DISCUSSED LAB RESULTS WITH PATIENT      Requested Prescriptions      No prescriptions requested or ordered in this encounter     Routine medications provided by this office will also be refilled via pharmacy request.       Current Outpatient Medications:   •  clonazePAM (KlonoPIN) 1 MG tablet, Take 1 tablet by mouth 3 (Three) Times a Day As Needed for Anxiety or Seizures., Disp: 90 tablet, Rfl: 1  •  escitalopram (LEXAPRO) 20 MG tablet, Take 1 tablet by mouth Daily., Disp: 90 tablet, Rfl: 3  •  famotidine (PEPCID) 20 MG tablet, TAKE 1 TABLET BY MOUTH TWICE A DAY, Disp: 60 tablet, Rfl: 0  •  HYDROcodone-acetaminophen (NORCO) 7.5-325 MG per tablet, Take 1 tablet by mouth Every 4 (Four) Hours As Needed for Moderate Pain ., Disp: 180 tablet, Rfl: 0  •  ondansetron (ZOFRAN) 8 MG tablet, Take 1 tablet by mouth 3 (Three) Times a Day As Needed for Nausea or Vomiting., Disp: 60 tablet, Rfl: 5  •  phenazopyridine (PYRIDIUM) 100 MG tablet, Take 1 tablet by mouth 3 (Three) Times a Day As Needed for Bladder Spasms., Disp: 120 tablet, Rfl: 1  •  rivaroxaban (XARELTO) 20 MG tablet, Take 1 tablet by mouth Daily. Start after finishing starter pack. (Patient taking differently: Take 20 mg by mouth Daily. PT HOLDING 48 HOURS PRIOR TO SURGERY), Disp: 30 tablet, Rfl: 11  •  ferrous sulfate 325 (65 FE) MG tablet, Take 1 tablet by mouth 3 (Three) Times a Day With Meals., Disp: 90 tablet, Rfl: 2  •  metoprolol tartrate (LOPRESSOR) 25 MG tablet, TAKE 1/2 TABLET BY MOUTH EVERY 12 (TWELVE) HOURS., Disp: 60 tablet, Rfl: 1       Assessment and Plan:        Problem List Items Addressed This Visit        Cardiovascular and Mediastinum    Other pulmonary embolism without acute cor pulmonale (CMS/HCC) - Primary     Improved - No soa,hoyos  Chronic AC             Digestive    Adenocarcinoma of rectum (CMS/HCC)       Nervous and Auditory    Pain " "associated with surgical procedure     Will continue norco at lowest effective dose  She will try to go to q 8 hours or try staying at q 6 but cutting tablets in half             Hematopoietic and Hemostatic    Heterozygous factor V Leiden mutation (CMS/HCC)       Other    Anxiety     Controlled on current rx          Monopolar depression (CMS/HCC)     Psychological condition is improving with treatment.  Continue current treatment regimen.  Psychological condition  will be reassessed at the next regular appointment.                The patient has read and signed the [facility] Controlled Substance Contract.  I will continue to see patient for regular follow up appointments.  They are well controlled on their medication.  HOLDEN is updated every 3 months. The patient is aware of the potential for addiction and dependence.    Discussed any change in Rx and discussed visit with patient.  All questions answered.      Return in about 3 months (around 4/29/2020).    Red \"Miller\" Mirian, APRN   01/29/20    Dragon disclaimer:   Much of this encounter note is an electronic transcription/translation of spoken language to printed text. The electronic translation of spoken language may permit erroneous, or at times, nonsensical words or phrases to be inadvertently transcribed; Although I have reviewed the note for such errors, some may still exist.     Additional Patient Counseling:       There are no Patient Instructions on file for this visit.  "

## 2020-01-30 NOTE — ASSESSMENT & PLAN NOTE
Will continue norco at lowest effective dose  She will try to go to q 8 hours or try staying at q 6 but cutting tablets in half

## 2020-02-05 ENCOUNTER — TELEPHONE (OUTPATIENT)
Dept: ONCOLOGY | Facility: CLINIC | Age: 40
End: 2020-02-05

## 2020-02-05 ENCOUNTER — INFUSION (OUTPATIENT)
Dept: ONCOLOGY | Facility: HOSPITAL | Age: 40
End: 2020-02-05

## 2020-02-05 ENCOUNTER — OFFICE VISIT (OUTPATIENT)
Dept: ONCOLOGY | Facility: CLINIC | Age: 40
End: 2020-02-05

## 2020-02-05 VITALS
WEIGHT: 210.8 LBS | HEIGHT: 65 IN | BODY MASS INDEX: 35.12 KG/M2 | HEART RATE: 92 BPM | SYSTOLIC BLOOD PRESSURE: 104 MMHG | RESPIRATION RATE: 16 BRPM | TEMPERATURE: 98.1 F | OXYGEN SATURATION: 97 % | DIASTOLIC BLOOD PRESSURE: 71 MMHG

## 2020-02-05 DIAGNOSIS — C20 ADENOCARCINOMA OF RECTUM (HCC): Primary | ICD-10-CM

## 2020-02-05 DIAGNOSIS — C20 ADENOCARCINOMA OF RECTUM (HCC): ICD-10-CM

## 2020-02-05 DIAGNOSIS — T45.4X5A ADVERSE EFFECT OF IRON, INITIAL ENCOUNTER: ICD-10-CM

## 2020-02-05 DIAGNOSIS — Z45.2 ENCOUNTER FOR FITTING AND ADJUSTMENT OF VASCULAR CATHETER: ICD-10-CM

## 2020-02-05 DIAGNOSIS — E61.1 IRON DEFICIENCY: ICD-10-CM

## 2020-02-05 LAB
ALBUMIN SERPL-MCNC: 3.3 G/DL (ref 3.5–5.2)
ALBUMIN/GLOB SERPL: 0.9 G/DL (ref 1.1–2.4)
ALP SERPL-CCNC: 84 U/L (ref 38–116)
ALT SERPL W P-5'-P-CCNC: 27 U/L (ref 0–33)
ANION GAP SERPL CALCULATED.3IONS-SCNC: 10.3 MMOL/L (ref 5–15)
AST SERPL-CCNC: 17 U/L (ref 0–32)
BASOPHILS # BLD AUTO: 0.01 10*3/MM3 (ref 0–0.2)
BASOPHILS NFR BLD AUTO: 0.4 % (ref 0–1.5)
BILIRUB SERPL-MCNC: <0.2 MG/DL (ref 0.2–1.2)
BUN BLD-MCNC: 10 MG/DL (ref 6–20)
BUN/CREAT SERPL: 13.9 (ref 7.3–30)
CALCIUM SPEC-SCNC: 9 MG/DL (ref 8.5–10.2)
CHLORIDE SERPL-SCNC: 101 MMOL/L (ref 98–107)
CO2 SERPL-SCNC: 27.7 MMOL/L (ref 22–29)
CREAT BLD-MCNC: 0.72 MG/DL (ref 0.6–1.1)
DEPRECATED RDW RBC AUTO: 45.5 FL (ref 37–54)
EOSINOPHIL # BLD AUTO: 0.13 10*3/MM3 (ref 0–0.4)
EOSINOPHIL NFR BLD AUTO: 5.6 % (ref 0.3–6.2)
ERYTHROCYTE [DISTWIDTH] IN BLOOD BY AUTOMATED COUNT: 14.6 % (ref 12.3–15.4)
GFR SERPL CREATININE-BSD FRML MDRD: 90 ML/MIN/1.73
GLOBULIN UR ELPH-MCNC: 3.5 GM/DL (ref 1.8–3.5)
GLUCOSE BLD-MCNC: 139 MG/DL (ref 74–124)
HCT VFR BLD AUTO: 38.7 % (ref 34–46.6)
HGB BLD-MCNC: 11.7 G/DL (ref 12–15.9)
IMM GRANULOCYTES # BLD AUTO: 0.01 10*3/MM3 (ref 0–0.05)
IMM GRANULOCYTES NFR BLD AUTO: 0.4 % (ref 0–0.5)
LYMPHOCYTES # BLD AUTO: 0.86 10*3/MM3 (ref 0.7–3.1)
LYMPHOCYTES NFR BLD AUTO: 37.2 % (ref 19.6–45.3)
MCH RBC QN AUTO: 26.3 PG (ref 26.6–33)
MCHC RBC AUTO-ENTMCNC: 30.2 G/DL (ref 31.5–35.7)
MCV RBC AUTO: 87 FL (ref 79–97)
MONOCYTES # BLD AUTO: 0.56 10*3/MM3 (ref 0.1–0.9)
MONOCYTES NFR BLD AUTO: 24.2 % (ref 5–12)
NEUTROPHILS # BLD AUTO: 0.74 10*3/MM3 (ref 1.7–7)
NEUTROPHILS NFR BLD AUTO: 32.2 % (ref 42.7–76)
NRBC BLD AUTO-RTO: 0 /100 WBC (ref 0–0.2)
PLATELET # BLD AUTO: 231 10*3/MM3 (ref 140–450)
PMV BLD AUTO: 8.3 FL (ref 6–12)
POTASSIUM BLD-SCNC: 4.2 MMOL/L (ref 3.5–4.7)
PROT SERPL-MCNC: 6.8 G/DL (ref 6.3–8)
RBC # BLD AUTO: 4.45 10*6/MM3 (ref 3.77–5.28)
SODIUM BLD-SCNC: 139 MMOL/L (ref 134–145)
WBC NRBC COR # BLD: 2.31 10*3/MM3 (ref 3.4–10.8)

## 2020-02-05 PROCEDURE — 96372 THER/PROPH/DIAG INJ SC/IM: CPT

## 2020-02-05 PROCEDURE — 63710000001 PROCHLORPERAZINE MALEATE PER 5 MG: Performed by: NURSE PRACTITIONER

## 2020-02-05 PROCEDURE — 99215 OFFICE O/P EST HI 40 MIN: CPT | Performed by: NURSE PRACTITIONER

## 2020-02-05 PROCEDURE — 25010000002 FILGRASTIM-SNDZ 480 MCG/0.8ML SOLUTION PREFILLED SYRINGE: Performed by: NURSE PRACTITIONER

## 2020-02-05 PROCEDURE — 80053 COMPREHEN METABOLIC PANEL: CPT

## 2020-02-05 PROCEDURE — 96374 THER/PROPH/DIAG INJ IV PUSH: CPT

## 2020-02-05 PROCEDURE — 85025 COMPLETE CBC W/AUTO DIFF WBC: CPT

## 2020-02-05 PROCEDURE — 25010000003 HEPARIN LOCK FLUCH PER 10 UNITS: Performed by: INTERNAL MEDICINE

## 2020-02-05 PROCEDURE — 25010000002 FERRIC CARBOXYMALTOSE 750 MG/15ML SOLUTION 15 ML VIAL: Performed by: NURSE PRACTITIONER

## 2020-02-05 RX ORDER — HEPARIN SODIUM (PORCINE) LOCK FLUSH IV SOLN 100 UNIT/ML 100 UNIT/ML
500 SOLUTION INTRAVENOUS AS NEEDED
Status: CANCELLED | OUTPATIENT
Start: 2020-02-05

## 2020-02-05 RX ORDER — HEPARIN SODIUM (PORCINE) LOCK FLUSH IV SOLN 100 UNIT/ML 100 UNIT/ML
500 SOLUTION INTRAVENOUS AS NEEDED
Status: DISCONTINUED | OUTPATIENT
Start: 2020-02-05 | End: 2020-02-05 | Stop reason: HOSPADM

## 2020-02-05 RX ORDER — PROCHLORPERAZINE MALEATE 10 MG
10 TABLET ORAL EVERY 6 HOURS PRN
Qty: 30 TABLET | Refills: 2 | COMMUNITY
End: 2022-04-12

## 2020-02-05 RX ORDER — PROCHLORPERAZINE MALEATE 5 MG/1
10 TABLET ORAL ONCE
Status: COMPLETED | OUTPATIENT
Start: 2020-02-05 | End: 2020-02-05

## 2020-02-05 RX ORDER — SODIUM CHLORIDE 9 MG/ML
250 INJECTION, SOLUTION INTRAVENOUS ONCE
Status: COMPLETED | OUTPATIENT
Start: 2020-02-05 | End: 2020-02-05

## 2020-02-05 RX ORDER — SODIUM CHLORIDE 0.9 % (FLUSH) 0.9 %
10 SYRINGE (ML) INJECTION AS NEEDED
Status: CANCELLED | OUTPATIENT
Start: 2020-02-05

## 2020-02-05 RX ORDER — SODIUM CHLORIDE 0.9 % (FLUSH) 0.9 %
10 SYRINGE (ML) INJECTION AS NEEDED
Status: DISCONTINUED | OUTPATIENT
Start: 2020-02-05 | End: 2020-02-05 | Stop reason: HOSPADM

## 2020-02-05 RX ORDER — PROCHLORPERAZINE MALEATE 5 MG/1
10 TABLET ORAL ONCE
Status: CANCELLED
Start: 2020-02-12

## 2020-02-05 RX ORDER — SODIUM CHLORIDE 9 MG/ML
250 INJECTION, SOLUTION INTRAVENOUS ONCE
Status: CANCELLED | OUTPATIENT
Start: 2020-02-12

## 2020-02-05 RX ADMIN — PROCHLORPERAZINE MALEATE 10 MG: 5 TABLET ORAL at 09:55

## 2020-02-05 RX ADMIN — FILGRASTIM-SNDZ 480 MCG: 480 INJECTION, SOLUTION INTRAVENOUS; SUBCUTANEOUS at 12:02

## 2020-02-05 RX ADMIN — Medication 10 ML: at 11:42

## 2020-02-05 RX ADMIN — Medication 500 UNITS: at 11:42

## 2020-02-05 RX ADMIN — FERRIC CARBOXYMALTOSE INJECTION 750 MG: 50 INJECTION, SOLUTION INTRAVENOUS at 09:55

## 2020-02-05 RX ADMIN — SODIUM CHLORIDE 250 ML: 9 INJECTION, SOLUTION INTRAVENOUS at 09:55

## 2020-02-05 NOTE — TELEPHONE ENCOUNTER
Patient was seen today and a rx was suppose to be called in that patient would take with her zofran. Pharmacy didn't have anything that was called in       [facility] pharmacy [phone]    Patient phone number- [phone]

## 2020-02-05 NOTE — PROGRESS NOTES
No blood return noted on port. Patient requesting no activase as port study was complete and port is okay to use. Patient seeing MICHAEL Flores today.

## 2020-02-05 NOTE — PROGRESS NOTES
REASON FOR FOLLOW UP:     1. Newly diagnosed rectal cancer, rectal ultrasound examination reported T3N0 disease without lymphadenopathy. She does have small pulmonary nodule 6-7 mm and 2 mm with indeterminate feature. There is no solid evidence of metastatic disease otherwise. Patient has stage IIA (T3N0M0) disease.  2. The patient is heterozygous factor V Leiden, currently on prophylactic anticoagulation with Lovenox 40 mg daily given her increased risk of thrombosis with MediPort and GI malignancy.   3.  The 8 mm hypermetabolic right upper lobe pulmonary nodule is suspicious for metastatic as well.    4.  Patient had a PowerPort placement on the left upper chest by Dr. Joseph on 8/9/2019.  5.  Patient was started on concurrent infusional 5-FU chemoradiation therapy on 8/12/2019, with planned complete surgical resection and further adjuvant chemotherapy with intention to cure the disease.   6. Patient underwent surgical resection of the colon on 12/2/2019 with Dr. Ye  7. Diarrhea related to therapy and radiation.   8. Patient here today in anticipation of cycle 1 day 1 of adjuvant FOLFOX 6.  9.  2/5/2020 FOLFOX 6 cycle 2 delayed secondary to neutropenia.  Days of Granix planned with each cycle.  Patient also intolerant of oral iron and Injectafer initiated.    HISTORY OF PRESENT ILLNESS:  The patient is a 39 y.o. year old female with the above medical history here today for cycle 2 of her FOLFOX.  She reports feeling fair, however, does report severe nausea for 3 to 4 days after her unhook.  This has resolved.  She took Zofran but felt this only helped minimally.    Her ANC today has dropped to 0.74.  Is not experiencing infectious symptoms.  Total white count is 2.31.    She did attempt oral iron but states she had stomach cramping from the first today.  This in addition to her severe nausea and her to discontinue her oral iron altogether.  Her hemoglobin today is 11.7.  Her iron saturation was 10% on  2020.    Past Medical History:   Diagnosis Date   • Abnormal Pap smear of cervix 1998    JULIUS I   • Anemia in pregnancy    • Anxiety    • Cervical lymphadenitis 2012    SEEN AT [facility] ER   • Chronic diarrhea    • Colon polyps     FOLLOWED BY DR. GERONIMO YE   • Depression    • Factor V Leiden (CMS/HCC)     DX 2019   • GERD (gastroesophageal reflux disease)    • Heart murmur     ?   • Hematochezia    • Hemorrhoids    • History of chemotherapy     FOLLOWED BY DR. ALEXANDRU HUNT   • History of gestational diabetes    • History of pre-eclampsia    • History of radiation therapy     FOLLOWED BY DR. JAVON LEWIS   • History of TB skin testing 2009    TB Skin Test   • HPV in female    • IBS (irritable bowel syndrome)    • Infected insect bite of neck 2016    SEEN AT [facility]   • Irritable bowel syndrome    • Kidney stones 2007    SEEN AT [facility] ER   • Lumbar disc disease    • On anticoagulant therapy    • PIH (pregnancy induced hypertension)    • Pulmonary embolism (CMS/HCC) 2019    ADMITTED TO [facility]   • Rectal cancer (CMS/HCC) 2019    INVASIVE MODERATELY DIFFERENTIATED ADENOCARCINOMA, CHEMO AND XRT FINISHED 2019   • Right leg pain    • Right ureteral stone 10/01/2019    SEEN AT [facility] ER   • SAB (spontaneous ) 1996     A2-1 INDUCED   • Sepsis due to cellulitis (CMS/HCC) 2002    LEFT GREAT TOE, ADMITTED TO [facility]     Past Surgical History:   Procedure Laterality Date   • CHOLECYSTECTOMY N/A 10/10/2003    LAPAROSCOPIC WITH CHOLANGIOGRAM, DR. JAMEY TALAVERA AT [facility]   • COLON RESECTION N/A 2019    Procedure: laparoscopic low anterior colon resection with mobilization of splenic flexure and diverting loop ileostomy: ERAS;  Surgeon: Geronimo Ye MD;  Location: [facility] OR;  Service: General   • COLONOSCOPY W/ POLYPECTOMY N/A 2019    15 MM TUBULOVILLOUS ADENOMA POLYP IN HEPATIC FLEXURE, 20 MMTUBULOVILLOUS ADENOMA WITH HIGH GRADE  DYSPLASIA IN RECTOSIGMOID, 4 CM MASS IN MID RECTUM, PATH: INVASIVE MODERATELY DIFFERENTIATED ADENOCARCINOMA, DR. ROLAND LI AT [facility]   • DILATATION AND EVACUATION N/A 2009   • ENDOSCOPY N/A 07/08/2019    LA GRADE A ESOPHAGITIS, GASTRITIS, ALL BIOPSIES BENIGN, DR. ROLAND LI AT [facility]   • PAP SMEAR N/A 01/24/2014   • SIGMOIDOSCOPY N/A 7/24/2019    INFILTRATIVE PARTIALLY OBSTRUCTING LARGE RECTAL CANCER, AREA TATOOED, DR. GERONIMO YE AT [facility]   • SIGMOIDOSCOPY N/A 11/23/2019    AN ULCERATED PARTIALLY OBSTRUCTING MASS IN MID RECTUM, AREA TATTOOED, DR. GERONIMO YE AT [facility]   • TRANSRECTAL ULTRASOUND N/A 7/24/2019    Procedure: ULTRASOUND TRANSRECTAL;  Surgeon: Geronimo Ye MD;  Location: [facility] ENDOSCOPY;  Service: General   • URETEROSCOPY LASER LITHOTRIPSY WITH STENT INSERTION Right 10/2019   • VAGINAL DELIVERY N/A 09/18/1998   • VENOUS ACCESS DEVICE (PORT) INSERTION Left 8/9/2019    Procedure: INSERTION VENOUS ACCESS DEVICE;  Surgeon: Sj Joseph MD;  Location: [facility] OR [facility];  Service: General   • WISDOM TOOTH EXTRACTION  1993       HEMATOLOGIC/ONCOLOGIC HISTORY:      The patient reports she has intermittent rectal bleeding and urgency, mucous with her stool, starting sometime in 2016. At that time she was referred to GI service, and was diagnosed of irritable bowel syndrome. Nevertheless she had worsening urgency for bowel movement, and had a couple of incidents losing control of stool. She was recently seen by Roland Li MD again and had colonoscopy and EGD exam on 07/08/2019. She was found having a circumferential rectal mass. According to the procedure note, the patient had a fungating circumferential bleeding 4 cm mass in the middle rectum, at distance between 13 cm and 17 cm from the anus. Mass was causing partial obstruction. There were also colon polyps found at the hepatic flexure and also at the rectosigmoid junction 23 cm from the anus. Both were resected and  retrieved. EGD examination reported grade A esophagitis at the GE junction and patchy discontinuous erythema and aggravation of the mucosa without bleeding in the stomach body. There is normal mucosa of the duodenum. Pathology evaluation from this procedure reported moderately differentiated adenocarcinoma involving the rectal mass. The rectal sigmoid junction polyp was tubular/tubulovillous adenoma with high grade dysplasia negative for invasive malignancy. The hepatic flexure polyp was also tubular/tubulovillous adenoma negative for high grade dysplasia or malignancy. The biopsy from the esophagus reports squamocolumnar mucosa with inflammatory changes suggestive of mild reflux esophagitis, negative for interstitial metaplasia. Gastric biopsy was benign and duodenal biopsy was also benign. There is no mention of H-pylori.     Patient was subsequently referred to colorectal surgeon Padmaja Ye MD for further evaluation. The patient had CT scan examination for chest, abdomen and pelvis requested by Dr. Ye and were done on 07/13/2019. The study reported no evidence of lymphadenopathy in the abdomen and pelvis. There was wall thickening of the rectosigmoid junction. Normal spleen, pancreas, adrenal glands and kidneys. There was fatty liver infiltration but no focal lymphatic lesions. There was a small 6-7 mm noncalcified nodule in the right upper lobe and a tiny 3 mm subpleural nodule in the right middle lobe. No mediastinal or hilar lymphadenopathy.     Dr. Ye performed staging rectal ultrasound examination. This study reported tumor penetrating through the muscularis propria as T3 disease; there were no lymph nodes identified.    She had a hospitalization in late September 2019 because of newly discovered pulmonary emboli.  She was on prophylactic Lovenox prior to the incident of PE.  Now she is on full dose Xarelto anticoagulation.  Patient reports no further chest pain dyspnea.  She denies bleeding or  bruising.  During her hospitalization, she was seen by Dr. Ye, who plans to have surgery 8 to 12 weeks after finishing radiation therapy.  She finished her radiation on 9/19/2019.     I noticed patient also presented to the emergency room on 10/1/2019 complaining of right flank area pain.  I reviewed the images studies and indeed she has a very small 1 to 2 mm obstructing kidney stone in the UV junction.  Patient is still symptomatic with some pains and dysuria.  She denies fever sweating or chills.    Laboratory study on 10/7/2019 reported normal CBC including hemoglobin 13.1, platelets 301,000, WBC 6170 and ANC 4900 lymphocytes 590 monocytes 480.      The nurse reported malfunction of port-a-catheter on 10/7/2019.  Port study on 10/8/2019 showed fibrin sheath around the distal aspect of the Mediport catheter in the SVC. This does not appear to hinder injection, but does prevent aspiration at this time.    Patient underwent surgical resection of the colon on 12/2/2019 with Dr. Ye.  Pathology evaluation reported residual T3 disease, also 1 out of 14 lymph nodes positive for malignancy.  So this patient in either had at least stage IIIb disease (T3 N1 M0?).      Adjuvant chemotherapy FOLFOX 6 will be started on 1/22/2020.      MEDICATIONS    Current Outpatient Medications:   •  clonazePAM (KlonoPIN) 1 MG tablet, Take 1 tablet by mouth 3 (Three) Times a Day As Needed for Anxiety or Seizures., Disp: 90 tablet, Rfl: 1  •  escitalopram (LEXAPRO) 20 MG tablet, Take 1 tablet by mouth Daily., Disp: 90 tablet, Rfl: 3  •  famotidine (PEPCID) 20 MG tablet, TAKE 1 TABLET BY MOUTH TWICE A DAY, Disp: 60 tablet, Rfl: 0  •  ferrous sulfate 325 (65 FE) MG tablet, Take 1 tablet by mouth 3 (Three) Times a Day With Meals., Disp: 90 tablet, Rfl: 2  •  HYDROcodone-acetaminophen (NORCO) 7.5-325 MG per tablet, Take 1 tablet by mouth Every 4 (Four) Hours As Needed for Moderate Pain ., Disp: 180 tablet, Rfl: 0  •  metoprolol tartrate  (LOPRESSOR) 25 MG tablet, TAKE 1/2 TABLET BY MOUTH EVERY 12 (TWELVE) HOURS., Disp: 60 tablet, Rfl: 1  •  ondansetron (ZOFRAN) 8 MG tablet, Take 1 tablet by mouth 3 (Three) Times a Day As Needed for Nausea or Vomiting., Disp: 60 tablet, Rfl: 5  •  phenazopyridine (PYRIDIUM) 100 MG tablet, Take 1 tablet by mouth 3 (Three) Times a Day As Needed for Bladder Spasms., Disp: 120 tablet, Rfl: 1  •  rivaroxaban (XARELTO) 20 MG tablet, Take 1 tablet by mouth Daily. Start after finishing starter pack. (Patient taking differently: Take 20 mg by mouth Daily. PT HOLDING 48 HOURS PRIOR TO SURGERY), Disp: 30 tablet, Rfl: 11    ALLERGIES:   No Known Allergies    SOCIAL HISTORY:       Social History     Socioeconomic History   • Marital status:      Spouse name: Justus   • Number of children: 1   • Years of education: Not on file   • Highest education level:  College   Occupational History     Employer: [employer]   Tobacco Use   • Smoking status: Current Every Day Smoker     Packs/day: 1.50     Years: 20.00     Pack years: 30.00     Types: Electronic Cigarette   • Smokeless tobacco: Never Used   Substance and Sexual Activity   • Alcohol use: Yes     Comment: Rarely   • Drug use: No   • Sexual activity: Defer         FAMILY HISTORY:   Mother has positive factor V Leiden mutation, although she did not have thrombosis, mother also is coronary disease with stenting, she also is occasional bruising.  Maternal grandmother had DVT, she had multiple surgical procedures.  Patient mother's half-brother had metastatic colon cancer at diagnosis in his 50s.  Maternal great aunt has breast cancer.  Patient will follow his skin cancer in his 60s, exclusive.    REVIEW OF SYSTEMS:  Review of Systems   Constitutional: Negative for appetite change, chills, diaphoresis, fatigue and fever.   HENT: Negative for congestion, hearing loss, mouth sores, rhinorrhea, sore throat and trouble swallowing.    Eyes: Negative for visual disturbance.  "  Respiratory: Negative for cough, chest tightness, shortness of breath and wheezing.    Cardiovascular: Negative for chest pain, palpitations and leg swelling.   Gastrointestinal: Positive for nausea (see hpi). Negative for abdominal distention, anal bleeding, constipation and vomiting.        See HPI    Endocrine: Negative for cold intolerance.   Genitourinary: Negative.  Negative for frequency, hematuria and urgency.   Musculoskeletal: Negative for arthralgias, back pain and joint swelling.   Skin: Negative.  Negative for rash and wound.   Allergic/Immunologic: Negative for immunocompromised state.   Neurological: Negative for dizziness, seizures, syncope, speech difficulty, weakness, numbness and headaches.   Hematological: Negative for adenopathy. Does not bruise/bleed easily.   Psychiatric/Behavioral: Negative for agitation, behavioral problems, confusion and suicidal ideas.          Vitals:    02/05/20 0831   Height: 166 cm (65.35\")     Current Status 2/5/2020   ECOG score 0      PHYSICAL EXAM:    GENERAL:  Well-developed, well-nourished  female in no acute distress.  She is accompanied by her mother  SKIN:  Warm, dry without rashes, purpura or petechiae.  EYES:  Pupils equal, round and reactive to light.  EOMs intact.  Conjunctivae normal.  EARS:  Hearing intact.  NOSE:  No nasal discharge.  MOUTH:  Tongue is well-papillated; no stomatitis or ulcers.  Lips normal.  THROAT:  Oropharynx without lesions or exudates.  NECK:  Supple with good range of motion; no thyromegaly or masses.  LYMPHATICS:  No cervical, supraclavicular adenopathy.  CHEST:  Lungs clear to auscultation. Good airflow.  CARDIAC:  Regular rate and rhythm without murmurs, rubs or gallops. Normal S1,S2.  ABDOMEN:  Soft, nontender, colostomy in place.  EXTREMITIES:  No clubbing, cyanosis or edema.  NEUROLOGICAL:  Cranial Nerves II-XII grossly intact.  No focal neurological deficits.  PSYCHIATRIC:  Normal affect and mood.      RECENT " LABS:  Lab Results   Component Value Date    WBC 5.59 01/22/2020    HGB 11.8 (L) 01/22/2020    HCT 38.5 01/22/2020    MCV 88.1 01/22/2020     01/22/2020     Lab Results   Component Value Date    NEUTROABS 4.34 01/22/2020     Lab Results   Component Value Date    CEA 0.84 01/22/2020     Glucose   Date Value Ref Range Status   01/22/2020 163 (H) 74 - 124 mg/dL Final     BUN   Date Value Ref Range Status   01/22/2020 5 (L) 6 - 20 mg/dL Final     Creatinine   Date Value Ref Range Status   01/22/2020 0.76 0.60 - 1.10 mg/dL Final     Sodium   Date Value Ref Range Status   01/22/2020 139 134 - 145 mmol/L Final     Potassium   Date Value Ref Range Status   01/22/2020 4.4 3.5 - 4.7 mmol/L Final     Chloride   Date Value Ref Range Status   01/22/2020 101 98 - 107 mmol/L Final     CO2   Date Value Ref Range Status   01/22/2020 26.1 22.0 - 29.0 mmol/L Final     Calcium   Date Value Ref Range Status   01/22/2020 9.3 8.5 - 10.2 mg/dL Final     Total Protein   Date Value Ref Range Status   01/22/2020 7.3 6.3 - 8.0 g/dL Final     Albumin   Date Value Ref Range Status   01/22/2020 3.50 3.50 - 5.20 g/dL Final     ALT (SGPT)   Date Value Ref Range Status   01/22/2020 36 (H) 0 - 33 U/L Final     AST (SGOT)   Date Value Ref Range Status   01/22/2020 29 0 - 32 U/L Final     Alkaline Phosphatase   Date Value Ref Range Status   01/22/2020 83 38 - 116 U/L Final     Total Bilirubin   Date Value Ref Range Status   01/22/2020 0.2 0.2 - 1.2 mg/dL Final     eGFR Non  Amer   Date Value Ref Range Status   01/22/2020 84 >60 mL/min/1.73 Final     BUN/Creatinine Ratio   Date Value Ref Range Status   01/22/2020 6.6 (L) 7.3 - 30.0 Final     Anion Gap   Date Value Ref Range Status   01/22/2020 11.9 5.0 - 15.0 mmol/L Final     PATHOLOGY :   Case Report   Surgical Pathology Report                         Case: [id number]                                   Authorizing Provider:  Padmaja Ye MD        Collected:           12/02/2019 11:56  AM           Ordering Location:     [facility]  Received:            12/02/2019 02:18 PM                                  MAIN OR                                                                       Pathologist:           Jania Perez MD                                                           Specimens:   1) - Large Intestine, Sigmoid Colon, SIGMOID AND RECTUM                                              2) - Large Intestine, Sigmoid Colon, proximal anastamotic ring                                       3) - Large Intestine, Sigmoid Colon, distal anastamotic ring                               Final Diagnosis   1. Sigmoid Colon and Rectum, Low Anterior Resection S/P Neoadjuvant Therapy:                A. INVASIVE MODERATELY DIFFERENTIATED ADENOCARCINOMA.               B. Tumor invades through the muscularis propria into elijah-colorectal tissue.               C. Treatment effect is present (Partial Response, Tumor Regression Score 2).                D. Margins are negative for tumor; Closest distance: 1.5 cm from the circumferential radial margin.                E. Negative for lymphovascular space invasion.               F. Single focus of perineural invasion.                G. ONE OF FOURTEEN LYMPH NODES, POSITIVE FOR CARCINOMA (1/14).                H. See Synoptic Report and Comment.      2. Sigmoid Colon, Proximal Anastomotic Ring, Resection:                A. Portion of colon with no significant histopathologic changes.     3. Sigmoid Colon, Distal Anastomotic Ring, Resection:               A. Portion of colon with no significant histopathologic changes.          RECENT IMAGING            Assessment/Plan    1.  Newly diagnosed rectal cancer, rectal ultrasound examination reported T3N0 disease without lymphadenopathy. CT scan of chest, abdomen and pelvis reported no lymphadenopathy in the abdomen, pelvis or chest. She does have small pulmonary nodule 6-7 mm and 2 mm with indeterminate  feature. There is no solid evidence of metastatic disease otherwise.   · Based on the CT scan and rectal ultrasound imaging studies, this patient had stage IIA (T3N0M0) disease.    · She had PET scan examination on 8/8/2019 which reported a hypermetabolic right upper lobe pulmonary nodule 6 mm with SUV 5.6.  This is suspicious for metastatic disease.  This patient may have stage IV disease.   · He initiated concurrent radiation with continuous 5-FU on 8/12/2019.  · Patient finished concurrent chemoradiation therapy.  · Patient underwent surgical resection of the colon and diverting loop ileostomy on 12/2/2019 with Dr. Ye.  Pathology evaluation reported residual T3 disease, with 1 out of 14 lymph nodes positive for malignancy.  Certainly this patient has at least stage IIIb rectal cancer (T3 N1 M0?)  ·  1/22/2020 adjuvant chemotherapy FOLFOX 6 regimen initiated.    · 2/5/2022 delayed secondary to neutropenia.  3 days of Granix starting today with plans for each treatment.    2 .  Pulmonary emboli with background of positive factor V Leiden mutation   · The patient has heterozygous factor V Leiden mutation and therefore was on low-dose anticoagulation with Lovenox, 40 mg daily given her increased risk of thrombosis with MediPort and GI malignancy.    · Nevertheless this patient was found to having pulmonary emboli on 9/20/2019 despite low-dose Lovenox.  She had a brief hospitalization in late September 2019, she was started on full dose Xarelto anticoagulation per protocol.  She is tolerating well, will continue same treatment.    3.  Kidney stone with mild right hydronephrosis.  Patient presented to the emergency room on 10/1/2019 complaining of right flank pain.  CT scan examination reported a small 1 to 2 mm obstructing calculus of the right ureter UV junction.  Patient continues to have some symptomology.  Patient has been referred to urology for further evaluation.    4. Loose, frequent stools:   · Patient is  "s/p surgical resection of the rectum tumor and diverting loop ileostomy on 12/2/2019 with Dr. Ye.  · She reports having liquid stools in her colostomy bag.  She reports her liquid stools typically appear \"clear mustard yellow\" or dark.  · She has had consistent output no issues.    5. Pelvic pain: Related to previous radiation therapy and the recent surgery for resection of the primary rectal cancer.  Stable at this point.  We will continue to monitor.  Improved    6. Hypokalemia: She is currently taking 20 mg of potassium every other day.    · Her potassium is 4.4 today on 1/22/2020.   · 2/5/2020 potassium was 4.2    7.  This patient has also strong family history for malignancy the patient had appointment with genetic counseling on September 3, 2019.    8.  Nausea: This does seem to be multifactorial with a large part due to anxiety.  The patient does continue on Lexapro at 20 mg daily as well as Klonopin 0.5 mg once daily as needed.  She is able to take this up to 2 times daily as needed and will try this over this next week.    · Has been taking Zofran which provides her only mild relief 3 to 4 days after treatment.    9.  Mild anemia, improved since her surgery.  She also has a history of iron deficiency.  Iron studies reveal a saturation of just 10%.  She was started on oral iron but was unable to tolerate due to GI side effects.    10.  Neutropenia secondary to chemotherapy.    PLAN:  1. We will delay treatment 1 week.  I have obtained approval of Granix and we will start her first of 3 planned days today.  I did educate the patient and her mother about this medication and why we are starting this.  We plan to incorporate 3 days of Granix with each cycle.  2. Will return in 1 week to see nurse practitioner in anticipation of cycle 2 with hopes of resuming.  3. She will continue Xarelto full dose anticoagulation.  4. She will continue her current regimen of antidiarrheal and increase her nutrition using " protein shakes.  5. Continue on Pepcid 20 mg twice daily.  6. Patient will continue follow-up with Dr. Ye.   7. We will provide the patient with her first of 2 planned Injectafer treatments.  I did confirm approval with Brigitte.  8. Will return in 3 weeks to see Dr. desouza in anticipation of cycle 3.  9. I have reviewed neutropenic precautions in detail with the patient and asked the patient to call the office with any new or worsening symptoms before her next scheduled visit.

## 2020-02-05 NOTE — TELEPHONE ENCOUNTER
Compazine script sent to pt's pharmacy. Dimitri,  Confirmed the script will be ready for the pt. Message left for pt.

## 2020-02-06 ENCOUNTER — INFUSION (OUTPATIENT)
Dept: ONCOLOGY | Facility: HOSPITAL | Age: 40
End: 2020-02-06

## 2020-02-06 DIAGNOSIS — C20 ADENOCARCINOMA OF RECTUM (HCC): Primary | ICD-10-CM

## 2020-02-06 PROCEDURE — 96372 THER/PROPH/DIAG INJ SC/IM: CPT

## 2020-02-06 PROCEDURE — 25010000002 FILGRASTIM-SNDZ 480 MCG/0.8ML SOLUTION PREFILLED SYRINGE: Performed by: INTERNAL MEDICINE

## 2020-02-06 RX ADMIN — FILGRASTIM-SNDZ 480 MCG: 480 INJECTION, SOLUTION INTRAVENOUS; SUBCUTANEOUS at 07:59

## 2020-02-07 ENCOUNTER — INFUSION (OUTPATIENT)
Dept: ONCOLOGY | Facility: HOSPITAL | Age: 40
End: 2020-02-07

## 2020-02-07 DIAGNOSIS — C20 ADENOCARCINOMA OF RECTUM (HCC): Primary | ICD-10-CM

## 2020-02-07 PROCEDURE — 96372 THER/PROPH/DIAG INJ SC/IM: CPT

## 2020-02-07 PROCEDURE — 25010000002 FILGRASTIM-SNDZ 480 MCG/0.8ML SOLUTION PREFILLED SYRINGE: Performed by: INTERNAL MEDICINE

## 2020-02-07 RX ADMIN — FILGRASTIM-SNDZ 480 MCG: 480 INJECTION, SOLUTION INTRAVENOUS; SUBCUTANEOUS at 08:21

## 2020-02-10 RX ORDER — FAMOTIDINE 20 MG/1
TABLET, FILM COATED ORAL
Qty: 60 TABLET | Refills: 1 | Status: SHIPPED | OUTPATIENT
Start: 2020-02-10 | End: 2020-03-03

## 2020-02-12 ENCOUNTER — DOCUMENTATION (OUTPATIENT)
Dept: ONCOLOGY | Facility: CLINIC | Age: 40
End: 2020-02-12

## 2020-02-12 ENCOUNTER — INFUSION (OUTPATIENT)
Dept: ONCOLOGY | Facility: HOSPITAL | Age: 40
End: 2020-02-12

## 2020-02-12 ENCOUNTER — OFFICE VISIT (OUTPATIENT)
Dept: ONCOLOGY | Facility: CLINIC | Age: 40
End: 2020-02-12

## 2020-02-12 VITALS
HEART RATE: 89 BPM | BODY MASS INDEX: 35.05 KG/M2 | OXYGEN SATURATION: 96 % | TEMPERATURE: 97.9 F | WEIGHT: 218.1 LBS | HEIGHT: 66 IN | DIASTOLIC BLOOD PRESSURE: 92 MMHG | SYSTOLIC BLOOD PRESSURE: 130 MMHG | RESPIRATION RATE: 16 BRPM

## 2020-02-12 DIAGNOSIS — T45.4X5A ADVERSE EFFECT OF IRON, INITIAL ENCOUNTER: ICD-10-CM

## 2020-02-12 DIAGNOSIS — T45.1X5A CHEMOTHERAPY INDUCED DIARRHEA: ICD-10-CM

## 2020-02-12 DIAGNOSIS — C20 ADENOCARCINOMA OF RECTUM (HCC): ICD-10-CM

## 2020-02-12 DIAGNOSIS — E61.1 IRON DEFICIENCY: ICD-10-CM

## 2020-02-12 DIAGNOSIS — R11.0 CHEMOTHERAPY-INDUCED NAUSEA: ICD-10-CM

## 2020-02-12 DIAGNOSIS — K52.1 CHEMOTHERAPY INDUCED DIARRHEA: ICD-10-CM

## 2020-02-12 DIAGNOSIS — C20 ADENOCARCINOMA OF RECTUM (HCC): Primary | ICD-10-CM

## 2020-02-12 DIAGNOSIS — T45.1X5A CHEMOTHERAPY-INDUCED NAUSEA: ICD-10-CM

## 2020-02-12 LAB
ALBUMIN SERPL-MCNC: 3.2 G/DL (ref 3.5–5.2)
ALBUMIN/GLOB SERPL: 1 G/DL (ref 1.1–2.4)
ALP SERPL-CCNC: 81 U/L (ref 38–116)
ALT SERPL W P-5'-P-CCNC: 33 U/L (ref 0–33)
ANION GAP SERPL CALCULATED.3IONS-SCNC: 13.3 MMOL/L (ref 5–15)
AST SERPL-CCNC: 21 U/L (ref 0–32)
BASOPHILS # BLD AUTO: 0.02 10*3/MM3 (ref 0–0.2)
BASOPHILS NFR BLD AUTO: 0.4 % (ref 0–1.5)
BILIRUB SERPL-MCNC: <0.2 MG/DL (ref 0.2–1.2)
BUN BLD-MCNC: 8 MG/DL (ref 6–20)
BUN/CREAT SERPL: 10.7 (ref 7.3–30)
CALCIUM SPEC-SCNC: 8.7 MG/DL (ref 8.5–10.2)
CHLORIDE SERPL-SCNC: 103 MMOL/L (ref 98–107)
CO2 SERPL-SCNC: 23.7 MMOL/L (ref 22–29)
CREAT BLD-MCNC: 0.75 MG/DL (ref 0.6–1.1)
DEPRECATED RDW RBC AUTO: 48 FL (ref 37–54)
EOSINOPHIL # BLD AUTO: 0.12 10*3/MM3 (ref 0–0.4)
EOSINOPHIL NFR BLD AUTO: 2.3 % (ref 0.3–6.2)
ERYTHROCYTE [DISTWIDTH] IN BLOOD BY AUTOMATED COUNT: 16.5 % (ref 12.3–15.4)
GFR SERPL CREATININE-BSD FRML MDRD: 86 ML/MIN/1.73
GLOBULIN UR ELPH-MCNC: 3.3 GM/DL (ref 1.8–3.5)
GLUCOSE BLD-MCNC: 149 MG/DL (ref 74–124)
HCT VFR BLD AUTO: 37 % (ref 34–46.6)
HGB BLD-MCNC: 11.2 G/DL (ref 12–15.9)
IMM GRANULOCYTES # BLD AUTO: 0.22 10*3/MM3 (ref 0–0.05)
IMM GRANULOCYTES NFR BLD AUTO: 4.3 % (ref 0–0.5)
LYMPHOCYTES # BLD AUTO: 1.07 10*3/MM3 (ref 0.7–3.1)
LYMPHOCYTES NFR BLD AUTO: 20.8 % (ref 19.6–45.3)
MCH RBC QN AUTO: 26.5 PG (ref 26.6–33)
MCHC RBC AUTO-ENTMCNC: 30.3 G/DL (ref 31.5–35.7)
MCV RBC AUTO: 87.7 FL (ref 79–97)
MONOCYTES # BLD AUTO: 0.65 10*3/MM3 (ref 0.1–0.9)
MONOCYTES NFR BLD AUTO: 12.6 % (ref 5–12)
NEUTROPHILS # BLD AUTO: 3.06 10*3/MM3 (ref 1.7–7)
NEUTROPHILS NFR BLD AUTO: 59.6 % (ref 42.7–76)
NRBC BLD AUTO-RTO: 0.4 /100 WBC (ref 0–0.2)
PLATELET # BLD AUTO: 211 10*3/MM3 (ref 140–450)
PMV BLD AUTO: 8.3 FL (ref 6–12)
POTASSIUM BLD-SCNC: 3.6 MMOL/L (ref 3.5–4.7)
PROT SERPL-MCNC: 6.5 G/DL (ref 6.3–8)
RBC # BLD AUTO: 4.22 10*6/MM3 (ref 3.77–5.28)
SODIUM BLD-SCNC: 140 MMOL/L (ref 134–145)
WBC NRBC COR # BLD: 5.14 10*3/MM3 (ref 3.4–10.8)

## 2020-02-12 PROCEDURE — 96368 THER/DIAG CONCURRENT INF: CPT

## 2020-02-12 PROCEDURE — 96411 CHEMO IV PUSH ADDL DRUG: CPT

## 2020-02-12 PROCEDURE — 25010000002 FERRIC CARBOXYMALTOSE 750 MG/15ML SOLUTION 15 ML VIAL: Performed by: NURSE PRACTITIONER

## 2020-02-12 PROCEDURE — 80053 COMPREHEN METABOLIC PANEL: CPT

## 2020-02-12 PROCEDURE — 96367 TX/PROPH/DG ADDL SEQ IV INF: CPT

## 2020-02-12 PROCEDURE — 25010000002 FLUOROURACIL PER 500 MG: Performed by: NURSE PRACTITIONER

## 2020-02-12 PROCEDURE — 96413 CHEMO IV INFUSION 1 HR: CPT

## 2020-02-12 PROCEDURE — 85025 COMPLETE CBC W/AUTO DIFF WBC: CPT

## 2020-02-12 PROCEDURE — 96415 CHEMO IV INFUSION ADDL HR: CPT

## 2020-02-12 PROCEDURE — 96375 TX/PRO/DX INJ NEW DRUG ADDON: CPT

## 2020-02-12 PROCEDURE — 96416 CHEMO PROLONG INFUSE W/PUMP: CPT

## 2020-02-12 PROCEDURE — 25010000002 DEXAMETHASONE SODIUM PHOSPHATE 100 MG/10ML SOLUTION: Performed by: NURSE PRACTITIONER

## 2020-02-12 PROCEDURE — 99214 OFFICE O/P EST MOD 30 MIN: CPT | Performed by: NURSE PRACTITIONER

## 2020-02-12 PROCEDURE — 25010000002 OXALIPLATIN PER 0.5 MG: Performed by: NURSE PRACTITIONER

## 2020-02-12 PROCEDURE — 25010000002 FOSAPREPITANT PER 1 MG: Performed by: NURSE PRACTITIONER

## 2020-02-12 PROCEDURE — 25010000002 LEUCOVORIN CALCIUM PER 50 MG: Performed by: NURSE PRACTITIONER

## 2020-02-12 PROCEDURE — 63710000001 PROCHLORPERAZINE MALEATE PER 5 MG: Performed by: NURSE PRACTITIONER

## 2020-02-12 PROCEDURE — 25010000002 PALONOSETRON PER 25 MCG: Performed by: NURSE PRACTITIONER

## 2020-02-12 RX ORDER — DEXTROSE MONOHYDRATE 50 MG/ML
250 INJECTION, SOLUTION INTRAVENOUS ONCE
Status: COMPLETED | OUTPATIENT
Start: 2020-02-12 | End: 2020-02-12

## 2020-02-12 RX ORDER — FAMOTIDINE 10 MG/ML
20 INJECTION, SOLUTION INTRAVENOUS AS NEEDED
Status: CANCELLED | OUTPATIENT
Start: 2020-02-12

## 2020-02-12 RX ORDER — SODIUM CHLORIDE 9 MG/ML
250 INJECTION, SOLUTION INTRAVENOUS ONCE
Status: COMPLETED | OUTPATIENT
Start: 2020-02-12 | End: 2020-02-12

## 2020-02-12 RX ORDER — FLUOROURACIL 50 MG/ML
400 INJECTION, SOLUTION INTRAVENOUS ONCE
Status: COMPLETED | OUTPATIENT
Start: 2020-02-12 | End: 2020-02-12

## 2020-02-12 RX ORDER — PALONOSETRON 0.05 MG/ML
0.25 INJECTION, SOLUTION INTRAVENOUS ONCE
Status: CANCELLED | OUTPATIENT
Start: 2020-02-12

## 2020-02-12 RX ORDER — PALONOSETRON 0.05 MG/ML
0.25 INJECTION, SOLUTION INTRAVENOUS ONCE
Status: COMPLETED | OUTPATIENT
Start: 2020-02-12 | End: 2020-02-12

## 2020-02-12 RX ORDER — PROCHLORPERAZINE MALEATE 5 MG/1
10 TABLET ORAL ONCE
Status: COMPLETED | OUTPATIENT
Start: 2020-02-12 | End: 2020-02-12

## 2020-02-12 RX ORDER — DEXTROSE MONOHYDRATE 50 MG/ML
250 INJECTION, SOLUTION INTRAVENOUS ONCE
Status: CANCELLED | OUTPATIENT
Start: 2020-02-12

## 2020-02-12 RX ORDER — FLUOROURACIL 50 MG/ML
400 INJECTION, SOLUTION INTRAVENOUS ONCE
Status: CANCELLED | OUTPATIENT
Start: 2020-02-12

## 2020-02-12 RX ORDER — DIPHENHYDRAMINE HYDROCHLORIDE 50 MG/ML
50 INJECTION INTRAMUSCULAR; INTRAVENOUS AS NEEDED
Status: CANCELLED | OUTPATIENT
Start: 2020-02-12

## 2020-02-12 RX ADMIN — PALONOSETRON 0.25 MG: 0.05 INJECTION, SOLUTION INTRAVENOUS at 09:35

## 2020-02-12 RX ADMIN — LEUCOVORIN CALCIUM 830 MG: 350 INJECTION, POWDER, LYOPHILIZED, FOR SOLUTION INTRAMUSCULAR; INTRAVENOUS at 09:43

## 2020-02-12 RX ADMIN — FERRIC CARBOXYMALTOSE INJECTION 750 MG: 50 INJECTION, SOLUTION INTRAVENOUS at 08:58

## 2020-02-12 RX ADMIN — OXALIPLATIN 175 MG: 5 INJECTION, SOLUTION, CONCENTRATE INTRAVENOUS at 09:43

## 2020-02-12 RX ADMIN — FLUOROURACIL 4970 MG: 50 INJECTION, SOLUTION INTRAVENOUS at 12:47

## 2020-02-12 RX ADMIN — SODIUM CHLORIDE 250 ML: 9 INJECTION, SOLUTION INTRAVENOUS at 08:55

## 2020-02-12 RX ADMIN — DEXAMETHASONE SODIUM PHOSPHATE 12 MG: 10 INJECTION, SOLUTION INTRAMUSCULAR; INTRAVENOUS at 09:17

## 2020-02-12 RX ADMIN — FLUOROURACIL 830 MG: 50 INJECTION, SOLUTION INTRAVENOUS at 12:46

## 2020-02-12 RX ADMIN — SODIUM CHLORIDE 100 ML: 9 INJECTION, SOLUTION INTRAVENOUS at 12:12

## 2020-02-12 RX ADMIN — PROCHLORPERAZINE MALEATE 10 MG: 5 TABLET ORAL at 08:55

## 2020-02-12 RX ADMIN — DEXTROSE 250 ML: 5 SOLUTION INTRAVENOUS at 09:43

## 2020-02-12 NOTE — PROGRESS NOTES
Nivestym is the preferred product for pts plan. I have submitted the PA for Nivestym to [facility] and the request is approved from 2/12/20-8/12/20    I have escribed the rx to [facility] for processing.     Alma De Anda NP is aware that pt will not rec the medication in time for this cycle.

## 2020-02-12 NOTE — PROGRESS NOTES
REASON FOR FOLLOW UP:     1. Newly diagnosed rectal cancer, rectal ultrasound examination reported T3N0 disease without lymphadenopathy. She does have small pulmonary nodule 6-7 mm and 2 mm with indeterminate feature. There is no solid evidence of metastatic disease otherwise. Patient has stage IIA (T3N0M0) disease.  2. The patient is heterozygous factor V Leiden, currently on prophylactic anticoagulation with Lovenox 40 mg daily given her increased risk of thrombosis with MediPort and GI malignancy.   3.  The 8 mm hypermetabolic right upper lobe pulmonary nodule is suspicious for metastatic as well.    4.  Patient had a PowerPort placement on the left upper chest by Dr. Joseph on 8/9/2019.  5.  Patient was started on concurrent infusional 5-FU chemoradiation therapy on 8/12/2019, with planned complete surgical resection and further adjuvant chemotherapy with intention to cure the disease.   6. Patient underwent surgical resection of the colon on 12/2/2019 with Dr. Ye  7. Diarrhea related to therapy and radiation.   8. Patient here today in anticipation of cycle 1 day 1 of adjuvant FOLFOX 6.  9.  2/5/2020 FOLFOX 6 cycle 2 delayed secondary to neutropenia.  Days of Granix planned with each cycle.  Patient also intolerant of oral iron and Injectafer initiated.    HISTORY OF PRESENT ILLNESS:  The patient is a 39 y.o. year old female with the above medical history here today for cycle 2 of her FOLFOX.  This is delayed x1 week secondary to neutropenia.  The patient ultimately received 3 days worth of Neupogen with recovery of her blood counts.  Of note, the patient struggled with significant nausea without any episodes of vomiting following cycle #1 of chemotherapy resulting in significant weight loss.  She is up 12 pounds over the last week as her appetite has normalized.  We will add Emend to her care plan today.    She is having several loose stools per her colostomy and has been hesitant to take Imodium due  to prior history of constipation.  I encouraged her to try this with a maximum of 8 tablets/day.  She denies any infectious symptoms including fevers or chills.  No excess bleeding or bruising noted.  She had the expected cold sensitivity related to the Oxaliplatin for about 3 days following treatment.    Labs today demonstrated total white blood cell count of 5.14, ANC of 3.06, hemoglobin of 11.2, platelets of 211,000.  She was found to be iron deficient last week and is intolerant to oral iron secondary to GI distress.  For this reason, she initiated IV iron therapy with Injectafer last week.  She will have her second dose today.    She has other concerns and is ready to proceed with treatment.    Past Medical History:   Diagnosis Date   • Abnormal Pap smear of cervix 02/02/1998    JULIUS I   • Anemia in pregnancy    • Anxiety    • Cervical lymphadenitis 06/06/2012    SEEN AT [facility] ER   • Chronic diarrhea    • Colon polyps     FOLLOWED BY DR. GERONIMO ESPARZA   • Depression    • Factor V Leiden (CMS/Former[facility])     DX 8-2-2019   • GERD (gastroesophageal reflux disease)    • Heart murmur     ?   • Hematochezia    • Hemorrhoids    • History of chemotherapy 2019    FOLLOWED BY DR. ALEXANDRU HUNT   • History of gestational diabetes    • History of pre-eclampsia    • History of radiation therapy 2019    FOLLOWED BY DR. JAVON LEWIS   • History of TB skin testing 01/17/2009    TB Skin Test   • HPV in female 1998   • IBS (irritable bowel syndrome)    • Infected insect bite of neck 05/27/2016    SEEN AT [facility]   • Irritable bowel syndrome    • Kidney stones 08/09/2007    SEEN AT [facility] ER   • Lumbar disc disease    • On anticoagulant therapy    • PIH (pregnancy induced hypertension) 1998   • Pulmonary embolism (CMS/HCC) 09/20/2019    ADMITTED TO [facility]   • Rectal cancer (CMS/HCC) 07/08/2019    INVASIVE MODERATELY DIFFERENTIATED ADENOCARCINOMA, CHEMO AND XRT FINISHED 9/2019   • Right leg pain    • Right ureteral stone 10/01/2019    SEEN AT  [facility] ER   • SAB (spontaneous ) 1996     A2-1 INDUCED   • Sepsis due to cellulitis (CMS/HCC) 2002    LEFT GREAT TOE, ADMITTED TO [facility]     Past Surgical History:   Procedure Laterality Date   • CHOLECYSTECTOMY N/A 10/10/2003    LAPAROSCOPIC WITH CHOLANGIOGRAM, DR. JAMEY TALAVERA AT [facility]   • COLON RESECTION N/A 2019    Procedure: laparoscopic low anterior colon resection with mobilization of splenic flexure and diverting loop ileostomy: ERAS;  Surgeon: Geronimo Ye MD;  Location: [facility] MAIN OR;  Service: General   • COLONOSCOPY W/ POLYPECTOMY N/A 2019    15 MM TUBULOVILLOUS ADENOMA POLYP IN HEPATIC FLEXURE, 20 MMTUBULOVILLOUS ADENOMA WITH HIGH GRADE DYSPLASIA IN RECTOSIGMOID, 4 CM MASS IN MID RECTUM, PATH: INVASIVE MODERATELY DIFFERENTIATED ADENOCARCINOMA, DR. JENNIFER LI AT [facility]   • DILATATION AND EVACUATION N/A    • ENDOSCOPY N/A 2019    LA GRADE A ESOPHAGITIS, GASTRITIS, ALL BIOPSIES BENIGN, DR. JENNIFER LI AT [facility]   • PAP SMEAR N/A 2014   • SIGMOIDOSCOPY N/A 2019    INFILTRATIVE PARTIALLY OBSTRUCTING LARGE RECTAL CANCER, AREA TATOOED, DR. GERONIMO YE AT [facility]   • SIGMOIDOSCOPY N/A 2019    AN ULCERATED PARTIALLY OBSTRUCTING MASS IN MID RECTUM, AREA TATTOOED, DR. GERONIMO YE AT [facility]   • TRANSRECTAL ULTRASOUND N/A 2019    Procedure: ULTRASOUND TRANSRECTAL;  Surgeon: Geronimo Ye MD;  Location: [facility] ENDOSCOPY;  Service: General   • URETEROSCOPY LASER LITHOTRIPSY WITH STENT INSERTION Right 10/2019   • VAGINAL DELIVERY N/A 1998   • VENOUS ACCESS DEVICE (PORT) INSERTION Left 2019    Procedure: INSERTION VENOUS ACCESS DEVICE;  Surgeon: Sj Joseph MD;  Location: [facility] OR OSC;  Service: General   • WISDOM TOOTH EXTRACTION         HEMATOLOGIC/ONCOLOGIC HISTORY:      The patient reports she has intermittent rectal bleeding and urgency, mucous with her stool, starting sometime in 2016. At that  time she was referred to GI service, and was diagnosed of irritable bowel syndrome. Nevertheless she had worsening urgency for bowel movement, and had a couple of incidents losing control of stool. She was recently seen by Roland Thorpe MD again and had colonoscopy and EGD exam on 07/08/2019. She was found having a circumferential rectal mass. According to the procedure note, the patient had a fungating circumferential bleeding 4 cm mass in the middle rectum, at distance between 13 cm and 17 cm from the anus. Mass was causing partial obstruction. There were also colon polyps found at the hepatic flexure and also at the rectosigmoid junction 23 cm from the anus. Both were resected and retrieved. EGD examination reported grade A esophagitis at the GE junction and patchy discontinuous erythema and aggravation of the mucosa without bleeding in the stomach body. There is normal mucosa of the duodenum. Pathology evaluation from this procedure reported moderately differentiated adenocarcinoma involving the rectal mass. The rectal sigmoid junction polyp was tubular/tubulovillous adenoma with high grade dysplasia negative for invasive malignancy. The hepatic flexure polyp was also tubular/tubulovillous adenoma negative for high grade dysplasia or malignancy. The biopsy from the esophagus reports squamocolumnar mucosa with inflammatory changes suggestive of mild reflux esophagitis, negative for interstitial metaplasia. Gastric biopsy was benign and duodenal biopsy was also benign. There is no mention of H-pylori.     Patient was subsequently referred to colorectal surgeon Padmaja Ye MD for further evaluation. The patient had CT scan examination for chest, abdomen and pelvis requested by Dr. Ye and were done on 07/13/2019. The study reported no evidence of lymphadenopathy in the abdomen and pelvis. There was wall thickening of the rectosigmoid junction. Normal spleen, pancreas, adrenal glands and kidneys. There was fatty  liver infiltration but no focal lymphatic lesions. There was a small 6-7 mm noncalcified nodule in the right upper lobe and a tiny 3 mm subpleural nodule in the right middle lobe. No mediastinal or hilar lymphadenopathy.     Dr. Ye performed staging rectal ultrasound examination. This study reported tumor penetrating through the muscularis propria as T3 disease; there were no lymph nodes identified.    She had a hospitalization in late September 2019 because of newly discovered pulmonary emboli.  She was on prophylactic Lovenox prior to the incident of PE.  Now she is on full dose Xarelto anticoagulation.  Patient reports no further chest pain dyspnea.  She denies bleeding or bruising.  During her hospitalization, she was seen by Dr. Ye, who plans to have surgery 8 to 12 weeks after finishing radiation therapy.  She finished her radiation on 9/19/2019.     I noticed patient also presented to the emergency room on 10/1/2019 complaining of right flank area pain.  I reviewed the images studies and indeed she has a very small 1 to 2 mm obstructing kidney stone in the UV junction.  Patient is still symptomatic with some pains and dysuria.  She denies fever sweating or chills.    Laboratory study on 10/7/2019 reported normal CBC including hemoglobin 13.1, platelets 301,000, WBC 6170 and ANC 4900 lymphocytes 590 monocytes 480.      The nurse reported malfunction of port-a-catheter on 10/7/2019.  Port study on 10/8/2019 showed fibrin sheath around the distal aspect of the Mediport catheter in the SVC. This does not appear to hinder injection, but does prevent aspiration at this time.    Patient underwent surgical resection of the colon on 12/2/2019 with Dr. Ye.  Pathology evaluation reported residual T3 disease, also 1 out of 14 lymph nodes positive for malignancy.  So this patient in either had at least stage IIIb disease (T3 N1 M0?).      Adjuvant chemotherapy FOLFOX 6 will be started on  1/22/2020.      MEDICATIONS    Current Outpatient Medications:   •  clonazePAM (KlonoPIN) 1 MG tablet, Take 1 tablet by mouth 3 (Three) Times a Day As Needed for Anxiety or Seizures., Disp: 90 tablet, Rfl: 1  •  escitalopram (LEXAPRO) 20 MG tablet, Take 1 tablet by mouth Daily., Disp: 90 tablet, Rfl: 3  •  famotidine (PEPCID) 20 MG tablet, TAKE 1 TABLET BY MOUTH TWICE A DAY, Disp: 60 tablet, Rfl: 1  •  ferrous sulfate 325 (65 FE) MG tablet, Take 1 tablet by mouth 3 (Three) Times a Day With Meals., Disp: 90 tablet, Rfl: 2  •  HYDROcodone-acetaminophen (NORCO) 7.5-325 MG per tablet, Take 1 tablet by mouth Every 4 (Four) Hours As Needed for Moderate Pain ., Disp: 180 tablet, Rfl: 0  •  metoprolol tartrate (LOPRESSOR) 25 MG tablet, TAKE 1/2 TABLET BY MOUTH EVERY 12 (TWELVE) HOURS., Disp: 60 tablet, Rfl: 1  •  ondansetron (ZOFRAN) 8 MG tablet, Take 1 tablet by mouth 3 (Three) Times a Day As Needed for Nausea or Vomiting., Disp: 60 tablet, Rfl: 5  •  phenazopyridine (PYRIDIUM) 100 MG tablet, Take 1 tablet by mouth 3 (Three) Times a Day As Needed for Bladder Spasms., Disp: 120 tablet, Rfl: 1  •  prochlorperazine (COMPAZINE) 10 MG tablet, Take 1 tablet by mouth Every 6 (Six) Hours As Needed for Nausea or Vomiting., Disp: 30 tablet, Rfl: 2  •  rivaroxaban (XARELTO) 20 MG tablet, Take 1 tablet by mouth Daily. Start after finishing starter pack. (Patient taking differently: Take 20 mg by mouth Daily. PT HOLDING 48 HOURS PRIOR TO SURGERY), Disp: 30 tablet, Rfl: 11    ALLERGIES:   No Known Allergies    SOCIAL HISTORY:       Social History     Socioeconomic History   • Marital status:      Spouse name: Justus   • Number of children: 1   • Years of education: Not on file   • Highest education level:  College   Occupational History     Employer: [employer]   Tobacco Use   • Smoking status: Current Every Day Smoker     Packs/day: 1.50     Years: 20.00     Pack years: 30.00     Types: Electronic Cigarette   • Smokeless  tobacco: Never Used   Substance and Sexual Activity   • Alcohol use: Yes     Comment: Rarely   • Drug use: No   • Sexual activity: Defer         FAMILY HISTORY:   Mother has positive factor V Leiden mutation, although she did not have thrombosis, mother also is coronary disease with stenting, she also is occasional bruising.  Maternal grandmother had DVT, she had multiple surgical procedures.  Patient mother's half-brother had metastatic colon cancer at diagnosis in his 50s.  Maternal great aunt has breast cancer.  Patient will follow his skin cancer in his 60s, exclusive.    REVIEW OF SYSTEMS:  Review of Systems   Constitutional: Negative for appetite change, chills, diaphoresis, fatigue and fever.   HENT: Negative for congestion, hearing loss, mouth sores, rhinorrhea, sore throat and trouble swallowing.    Eyes: Negative for visual disturbance.   Respiratory: Negative for cough, chest tightness, shortness of breath and wheezing.    Cardiovascular: Negative for chest pain, palpitations and leg swelling.   Gastrointestinal: Positive for diarrhea (see HPI ) and nausea (see hpi). Negative for abdominal distention, anal bleeding, constipation and vomiting.        See HPI    Endocrine: Negative for cold intolerance.   Genitourinary: Negative.  Negative for frequency, hematuria and urgency.   Musculoskeletal: Negative for arthralgias, back pain and joint swelling.   Skin: Negative.  Negative for rash and wound.   Allergic/Immunologic: Negative for immunocompromised state.   Neurological: Negative for dizziness, seizures, syncope, speech difficulty, weakness, numbness and headaches.   Hematological: Negative for adenopathy. Does not bruise/bleed easily.   Psychiatric/Behavioral: Negative for agitation, behavioral problems, confusion and suicidal ideas.          Vitals:    02/12/20 0756   BP: 130/92   Pulse: 89   Resp: 16   Temp: 97.9 °F (36.6 °C)   TempSrc: Oral   SpO2: 96%   Weight: 98.9 kg (218 lb 1.6 oz)   Height:  "167.6 cm (65.98\")   PainSc: 0-No pain     Current Status 2/12/2020   ECOG score 0      PHYSICAL EXAM:    GENERAL:  Well-developed, well-nourished  female in no acute distress.  She is accompanied by her mother  SKIN:  Warm, dry without rashes, purpura or petechiae.  EYES:  Pupils equal, round and reactive to light.  EOMs intact.  Conjunctivae normal.  EARS:  Hearing intact.  NOSE:  No nasal discharge.  MOUTH:  Tongue is well-papillated; no stomatitis or ulcers.  Lips normal.  THROAT:  Oropharynx without lesions or exudates.  NECK:  Supple with good range of motion; no thyromegaly or masses.  LYMPHATICS:  No cervical, supraclavicular adenopathy.  CHEST:  Lungs clear to auscultation. Good airflow.  CARDIAC:  Regular rate and rhythm without murmurs, rubs or gallops. Normal S1,S2.  ABDOMEN:  Soft, nontender, colostomy in place.  EXTREMITIES:  No clubbing, cyanosis or edema.  NEUROLOGICAL:  Cranial Nerves II-XII grossly intact.  No focal neurological deficits.  PSYCHIATRIC:  Normal affect and mood.    Exam unchanged from previous    RECENT LABS:  Lab Results   Component Value Date    WBC 5.14 02/12/2020    HGB 11.2 (L) 02/12/2020    HCT 37.0 02/12/2020    MCV 87.7 02/12/2020     02/12/2020     Lab Results   Component Value Date    NEUTROABS 3.06 02/12/2020     Lab Results   Component Value Date    CEA 0.84 01/22/2020     Glucose   Date Value Ref Range Status   02/05/2020 139 (H) 74 - 124 mg/dL Final     BUN   Date Value Ref Range Status   02/05/2020 10 6 - 20 mg/dL Final     Creatinine   Date Value Ref Range Status   02/05/2020 0.72 0.60 - 1.10 mg/dL Final     Sodium   Date Value Ref Range Status   02/05/2020 139 134 - 145 mmol/L Final     Potassium   Date Value Ref Range Status   02/05/2020 4.2 3.5 - 4.7 mmol/L Final     Chloride   Date Value Ref Range Status   02/05/2020 101 98 - 107 mmol/L Final     CO2   Date Value Ref Range Status   02/05/2020 27.7 22.0 - 29.0 mmol/L Final     Calcium   Date Value " Ref Range Status   02/05/2020 9.0 8.5 - 10.2 mg/dL Final     Total Protein   Date Value Ref Range Status   02/05/2020 6.8 6.3 - 8.0 g/dL Final     Albumin   Date Value Ref Range Status   02/05/2020 3.30 (L) 3.50 - 5.20 g/dL Final     ALT (SGPT)   Date Value Ref Range Status   02/05/2020 27 0 - 33 U/L Final     AST (SGOT)   Date Value Ref Range Status   02/05/2020 17 0 - 32 U/L Final     Alkaline Phosphatase   Date Value Ref Range Status   02/05/2020 84 38 - 116 U/L Final     Total Bilirubin   Date Value Ref Range Status   02/05/2020 <0.2 (L) 0.2 - 1.2 mg/dL Final     eGFR Non  Amer   Date Value Ref Range Status   02/05/2020 90 >60 mL/min/1.73 Final     BUN/Creatinine Ratio   Date Value Ref Range Status   02/05/2020 13.9 7.3 - 30.0 Final     Anion Gap   Date Value Ref Range Status   02/05/2020 10.3 5.0 - 15.0 mmol/L Final     PATHOLOGY :   Case Report   Surgical Pathology Report                         Case: [id number]                                   Authorizing Provider:  Padmaja Ye MD        Collected:           12/02/2019 11:56 AM           Ordering Location:     [facility]  Received:            12/02/2019 02:18 PM                                  MAIN OR                                                                       Pathologist:           Jania Perez MD                                                           Specimens:   1) - Large Intestine, Sigmoid Colon, SIGMOID AND RECTUM                                              2) - Large Intestine, Sigmoid Colon, proximal anastamotic ring                                       3) - Large Intestine, Sigmoid Colon, distal anastamotic ring                               Final Diagnosis   1. Sigmoid Colon and Rectum, Low Anterior Resection S/P Neoadjuvant Therapy:                A. INVASIVE MODERATELY DIFFERENTIATED ADENOCARCINOMA.               B. Tumor invades through the muscularis propria into elijah-colorectal tissue.                C. Treatment effect is present (Partial Response, Tumor Regression Score 2).                D. Margins are negative for tumor; Closest distance: 1.5 cm from the circumferential radial margin.                E. Negative for lymphovascular space invasion.               F. Single focus of perineural invasion.                G. ONE OF FOURTEEN LYMPH NODES, POSITIVE FOR CARCINOMA (1/14).                H. See Synoptic Report and Comment.      2. Sigmoid Colon, Proximal Anastomotic Ring, Resection:                A. Portion of colon with no significant histopathologic changes.     3. Sigmoid Colon, Distal Anastomotic Ring, Resection:               A. Portion of colon with no significant histopathologic changes.          RECENT IMAGING            Assessment/Plan    1.  Newly diagnosed rectal cancer, rectal ultrasound examination reported T3N0 disease without lymphadenopathy. CT scan of chest, abdomen and pelvis reported no lymphadenopathy in the abdomen, pelvis or chest. She does have small pulmonary nodule 6-7 mm and 2 mm with indeterminate feature. There is no solid evidence of metastatic disease otherwise.   · Based on the CT scan and rectal ultrasound imaging studies, this patient had stage IIA (T3N0M0) disease.    · She had PET scan examination on 8/8/2019 which reported a hypermetabolic right upper lobe pulmonary nodule 6 mm with SUV 5.6.  This is suspicious for metastatic disease.  This patient may have stage IV disease.   · He initiated concurrent radiation with continuous 5-FU on 8/12/2019.  · Patient finished concurrent chemoradiation therapy.  · Patient underwent surgical resection of the colon and diverting loop ileostomy on 12/2/2019 with Dr. Ye.  Pathology evaluation reported residual T3 disease, with 1 out of 14 lymph nodes positive for malignancy.  Certainly this patient has at least stage IIIb rectal cancer (T3 N1 M0?)  ·  1/22/2020 adjuvant chemotherapy FOLFOX 6 regimen initiated.    · 2/5/2022  delayed secondary to neutropenia.  3 days of Granix administered  · 2/12/20.  The patient returns today for consideration of cycle #2 which was delayed secondary to neutropenia.  As noted above, she received 3 days of Granix with recovery of her blood counts.  She is feeling reasonably well today with restored appetite.  She continues to have loose stools, though I have encouraged her to take Imodium for this.  We will proceed with 3 days of Granix on day 3 of each subsequent cycle of chemotherapy.  For now, the Granix will be administered over the weekend at the hospital.  I have spoken with Priti Echeverria and we are in the process of getting approval for self administration at home.  We have also added Emend to today's care plan and all remaining cycles of chemotherapy.  The patient will return in 2 weeks to see Dr. Nguyen with her neck cycle of chemotherapy.    2 .  Pulmonary emboli with background of positive factor V Leiden mutation   · The patient has heterozygous factor V Leiden mutation and therefore was on low-dose anticoagulation with Lovenox, 40 mg daily given her increased risk of thrombosis with MediPort and GI malignancy.    · Nevertheless this patient was found to having pulmonary emboli on 9/20/2019 despite low-dose Lovenox.  She had a brief hospitalization in late September 2019, she was started on full dose Xarelto anticoagulation per protocol.    · The patient continues on Xarelto and is tolerating this well without bleeding issues.    3.  Kidney stone with mild right hydronephrosis.  Patient presented to the emergency room on 10/1/2019 complaining of right flank pain.  CT scan examination reported a small 1 to 2 mm obstructing calculus of the right ureter UV junction.  Patient continues to have some symptomology.  Patient has been referred to urology for further evaluation.    4. Loose, frequent stools:   · Patient is s/p surgical resection of the rectum tumor and diverting loop ileostomy on  "12/2/2019 with Dr. Ye.  · She reports having liquid stools in her colostomy bag.  She reports her liquid stools typically appear \"clear mustard yellow\" or dark.  · The patient has been hesitant to take Imodium due to fear of recurrent constipation.  I have encouraged her to try 1 tablet of Imodium with loose stools and abdominal cramping to see if this benefits her.  She is willing to try this.    5. Pelvic pain: Related to previous radiation therapy and the recent surgery for resection of the primary rectal cancer.  Stable at this point.  We will continue to monitor.  Improved    6. Hypokalemia: She is currently taking 20 mg of potassium every other day.    · Her potassium is 4.4 today on 1/22/2020.   · 2/12/20 patient's potassium level is 3.6 today    7.  This patient has also strong family history for malignancy the patient had appointment with genetic counseling on September 3, 2019.    8.  Nausea: This does seem to be multifactorial with a large part due to anxiety.  The patient does continue on Lexapro at 20 mg daily as well as Klonopin 0.5 mg once daily as needed.  She is able to take this up to 2 times daily as needed and will try this over this next week.    · The patient has Zofran on hand and was given a prescription for Compazine at her last office visit.  She had been taking them together and I have instructed her to alternate the Compazine with the Zofran.  In addition to this, we have had Emend approved beginning with today's chemotherapy    9.  Mild anemia, improved since her surgery.  She also has a history of iron deficiency.  Iron studies reveal a saturation of just 10%.  She was started on oral iron but was unable to tolerate due to GI side effects.     · The patient will receive her second weekly dose of Injectafer today    10.  Neutropenia secondary to chemotherapy.  · As noted above, the patient will receive 3 days of Granix begin on day 3 of each cycle of chemotherapy.  We are in the " process of obtaining approval for self administration at home.  This will hopefully begin with cycle #3 of therapy    PLAN:  1. Proceed with cycle #2 of chemotherapy today with 3 days of Granix as noted above  2. Emend will be added to today's care plan with all subsequent cycles  3. She will continue Xarelto full dose anticoagulation.  4. The patient was again encouraged to take Imodium for loose stools.  5. Continue on Pepcid 20 mg twice daily.  6. Patient will continue follow-up with Dr. Ye.   7. Proceed with second planned dose of Injectafer today  8. The patient return in 2 weeks for MD reevaluation with Dr. Gallardo prior to cycle #3 of chemotherapy  9. I have asked her to call with any new issues or concerns prior to her next visit.  She has verbalized understanding.    -The patient is on drug therapy requiring extensive monitoring.  Additionally, we discussed control of her diarrhea and appropriate use of antiemetics

## 2020-02-12 NOTE — PROGRESS NOTES
1150: Folfox complete.  Pt says she is feeling a little shaky and sweaty.  /89, HR 73.  O2 sat 96%.  Cool rag given.  IV fluids on KVO.  She is still due to have her emend today.      1155: JULIET Marquez at chairside to [facility].  Will observe for 15minutes beofre starting the emend to see if symptoms resolve.  Pt is feeling a little shaky in her legs and sort of felt like she may want to throw up, but that feeling has passed.     1210: /84, HR 78.  Tingly sensation in legs resolved. Doesn't feel sweaty anymore at this time.  Will proceed with emend at this time.

## 2020-02-12 NOTE — PROGRESS NOTES
Call rec from Alma De Anda NP about pt doing Neupogen at home. Pt needs 480 mcg for 3 days after each treatment (every 2 weeks).    I have submitted the PA to [employer] [facility] through covermymeds.    Waiting for a decision.

## 2020-02-14 ENCOUNTER — INFUSION (OUTPATIENT)
Dept: ONCOLOGY | Facility: HOSPITAL | Age: 40
End: 2020-02-14

## 2020-02-14 DIAGNOSIS — C20 ADENOCARCINOMA OF RECTUM (HCC): Primary | ICD-10-CM

## 2020-02-14 DIAGNOSIS — Z45.2 ENCOUNTER FOR FITTING AND ADJUSTMENT OF VASCULAR CATHETER: ICD-10-CM

## 2020-02-14 PROCEDURE — 25010000003 HEPARIN LOCK FLUCH PER 10 UNITS: Performed by: INTERNAL MEDICINE

## 2020-02-14 RX ORDER — HEPARIN SODIUM (PORCINE) LOCK FLUSH IV SOLN 100 UNIT/ML 100 UNIT/ML
500 SOLUTION INTRAVENOUS AS NEEDED
Status: CANCELLED | OUTPATIENT
Start: 2020-02-14

## 2020-02-14 RX ORDER — SODIUM CHLORIDE 0.9 % (FLUSH) 0.9 %
10 SYRINGE (ML) INJECTION AS NEEDED
Status: CANCELLED | OUTPATIENT
Start: 2020-02-14

## 2020-02-14 RX ORDER — HEPARIN SODIUM (PORCINE) LOCK FLUSH IV SOLN 100 UNIT/ML 100 UNIT/ML
500 SOLUTION INTRAVENOUS AS NEEDED
Status: DISCONTINUED | OUTPATIENT
Start: 2020-02-14 | End: 2020-02-14 | Stop reason: HOSPADM

## 2020-02-14 RX ORDER — SODIUM CHLORIDE 0.9 % (FLUSH) 0.9 %
10 SYRINGE (ML) INJECTION AS NEEDED
Status: DISCONTINUED | OUTPATIENT
Start: 2020-02-14 | End: 2020-02-14 | Stop reason: HOSPADM

## 2020-02-14 RX ADMIN — SODIUM CHLORIDE, PRESERVATIVE FREE 10 ML: 5 INJECTION INTRAVENOUS at 10:18

## 2020-02-14 RX ADMIN — Medication 500 UNITS: at 10:19

## 2020-02-15 ENCOUNTER — INFUSION (OUTPATIENT)
Dept: ONCOLOGY | Facility: HOSPITAL | Age: 40
End: 2020-02-15

## 2020-02-15 VITALS
SYSTOLIC BLOOD PRESSURE: 125 MMHG | HEART RATE: 107 BPM | DIASTOLIC BLOOD PRESSURE: 86 MMHG | TEMPERATURE: 97.4 F | RESPIRATION RATE: 20 BRPM | OXYGEN SATURATION: 98 %

## 2020-02-15 DIAGNOSIS — C20 ADENOCARCINOMA OF RECTUM (HCC): Primary | ICD-10-CM

## 2020-02-15 PROCEDURE — 25010000002 FILGRASTIM-SNDZ 480 MCG/0.8ML SOLUTION PREFILLED SYRINGE: Performed by: INTERNAL MEDICINE

## 2020-02-15 PROCEDURE — 96372 THER/PROPH/DIAG INJ SC/IM: CPT

## 2020-02-15 RX ADMIN — FILGRASTIM-SNDZ 480 MCG: 480 INJECTION, SOLUTION INTRAVENOUS; SUBCUTANEOUS at 14:33

## 2020-02-16 ENCOUNTER — INFUSION (OUTPATIENT)
Dept: ONCOLOGY | Facility: HOSPITAL | Age: 40
End: 2020-02-16

## 2020-02-16 VITALS
HEART RATE: 97 BPM | DIASTOLIC BLOOD PRESSURE: 91 MMHG | SYSTOLIC BLOOD PRESSURE: 155 MMHG | TEMPERATURE: 96.9 F | OXYGEN SATURATION: 98 %

## 2020-02-16 DIAGNOSIS — C20 ADENOCARCINOMA OF RECTUM (HCC): Primary | ICD-10-CM

## 2020-02-16 PROCEDURE — 25010000002 FILGRASTIM-SNDZ 480 MCG/0.8ML SOLUTION PREFILLED SYRINGE: Performed by: INTERNAL MEDICINE

## 2020-02-16 PROCEDURE — 96372 THER/PROPH/DIAG INJ SC/IM: CPT

## 2020-02-16 RX ADMIN — FILGRASTIM-SNDZ 480 MCG: 480 INJECTION, SOLUTION INTRAVENOUS; SUBCUTANEOUS at 14:13

## 2020-02-17 ENCOUNTER — INFUSION (OUTPATIENT)
Dept: ONCOLOGY | Facility: HOSPITAL | Age: 40
End: 2020-02-17

## 2020-02-17 DIAGNOSIS — C20 ADENOCARCINOMA OF RECTUM (HCC): Primary | ICD-10-CM

## 2020-02-17 PROCEDURE — 96372 THER/PROPH/DIAG INJ SC/IM: CPT

## 2020-02-17 PROCEDURE — 25010000002 FILGRASTIM-SNDZ 480 MCG/0.8ML SOLUTION PREFILLED SYRINGE: Performed by: INTERNAL MEDICINE

## 2020-02-17 RX ADMIN — FILGRASTIM-SNDZ 480 MCG: 480 INJECTION, SOLUTION INTRAVENOUS; SUBCUTANEOUS at 09:15

## 2020-02-19 ENCOUNTER — DOCUMENTATION (OUTPATIENT)
Dept: ONCOLOGY | Facility: CLINIC | Age: 40
End: 2020-02-19

## 2020-02-19 DIAGNOSIS — C20 ADENOCARCINOMA OF RECTUM (HCC): ICD-10-CM

## 2020-02-19 RX ORDER — HYDROCODONE BITARTRATE AND ACETAMINOPHEN 7.5; 325 MG/1; MG/1
1 TABLET ORAL EVERY 4 HOURS PRN
Qty: 180 TABLET | Refills: 0 | Status: SHIPPED | OUTPATIENT
Start: 2020-02-19 | End: 2020-08-10 | Stop reason: SDUPTHER

## 2020-02-19 NOTE — TELEPHONE ENCOUNTER
PATIENT CALLED TO REQUEST A REFILL OF THE HYDROCODONE-ACETAMINOPHEN (NORCO) 7.5-325 MG PER TABLET. THE PATIENT STATED THAT SHE HAS 15 TABLETS LEFT OF THIS MEDICATION.    I CONFIRMED THE CORRECT PHARMACY WITH THE PATIENT TO BE [facility] ON [address].    IF THERE ARE ANY QUESTIONS OR CONCERNS PLEASE CALL THE PATIENT -[phone] OR THE PHARMACY AT [facility] [phone].

## 2020-02-21 ENCOUNTER — OFFICE VISIT (OUTPATIENT)
Dept: SURGERY | Facility: CLINIC | Age: 40
End: 2020-02-21

## 2020-02-21 VITALS
BODY MASS INDEX: 33.57 KG/M2 | SYSTOLIC BLOOD PRESSURE: 126 MMHG | HEART RATE: 88 BPM | WEIGHT: 208.9 LBS | DIASTOLIC BLOOD PRESSURE: 80 MMHG | HEIGHT: 66 IN | TEMPERATURE: 97.7 F | OXYGEN SATURATION: 98 %

## 2020-02-21 DIAGNOSIS — C20 RECTAL CANCER (HCC): Primary | ICD-10-CM

## 2020-02-21 DIAGNOSIS — Z79.01 ANTICOAGULATED: ICD-10-CM

## 2020-02-21 PROCEDURE — 99024 POSTOP FOLLOW-UP VISIT: CPT | Performed by: COLON & RECTAL SURGERY

## 2020-02-21 RX ORDER — LORATADINE 10 MG/1
10 CAPSULE, LIQUID FILLED ORAL EVERY EVENING
COMMUNITY

## 2020-02-21 NOTE — PROGRESS NOTES
"Carole Weaver is a 39 y.o. female in for follow up of Rectal cancer (CMS/HCC)    Anticoagulated  s/p laparoscopic low anterior colon resection with diverting loop ileostomy 12/2/2019    Pt states the drainage from her rectum is unchanged  She has had stool per rectum    Midline abdominal wound has completely healed in; she is no longer packing    She is taking less pain medication    On adjuvant FOLFOX; she has had 2 cycles  Neupogen injections    /80 (BP Location: Left arm, Patient Position: Sitting, Cuff Size: Adult)   Pulse 88   Temp 97.7 °F (36.5 °C) (Oral)   Ht 167.6 cm (66\")   Wt 94.8 kg (208 lb 14.4 oz)   LMP  (LMP Unknown)   SpO2 98%   Breastfeeding No   BMI 33.72 kg/m²   Body mass index is 33.72 kg/m².      PE:  Physical Exam   Constitutional: She appears well-developed. No distress.   HENT:   Head: Normocephalic and atraumatic.   Abdominal:   -s/nt/nd  -midline vertical incision c/d/i, healing well  -stoma pink with piecey brown output in bag   Musculoskeletal: Normal range of motion.   Neurological: She is alert.   Psychiatric: Thought content normal.         Assessment:   1. Rectal cancer (CMS/HCC)    2. Anticoagulated    s/p laparoscopic low anterior colon resection with diverting loop ileostomy 12/2/2019    Plan:    She is healing well postop.  Discussion that stool per rectum is normal with her loop ileostomy.    She has bentyl at home; she can take this for abdominal cramping.    Continue chemo per Dr. Nguyen.  Discussed after she finishes chemo this summer, will plan for GGE 6 weeks after to assess anastomosis. Pending GGE results, will plan for ileostomy takedown.    Call or come in if any questions or concerns      RTC after she finishes chemo      Scribed for Padmaja Ye MD by Parul Rios PAELBA  2/21/2020   This patient was evaluated by me, recommendations made, documentation reviewed, edited, and revised by me, Padmaja Ye MD        "

## 2020-02-26 ENCOUNTER — INFUSION (OUTPATIENT)
Dept: ONCOLOGY | Facility: HOSPITAL | Age: 40
End: 2020-02-26

## 2020-02-26 ENCOUNTER — OFFICE VISIT (OUTPATIENT)
Dept: ONCOLOGY | Facility: CLINIC | Age: 40
End: 2020-02-26

## 2020-02-26 VITALS
DIASTOLIC BLOOD PRESSURE: 88 MMHG | BODY MASS INDEX: 35.24 KG/M2 | WEIGHT: 211.5 LBS | OXYGEN SATURATION: 97 % | RESPIRATION RATE: 16 BRPM | SYSTOLIC BLOOD PRESSURE: 134 MMHG | HEIGHT: 65 IN | TEMPERATURE: 98.2 F | HEART RATE: 88 BPM

## 2020-02-26 DIAGNOSIS — C20 ADENOCARCINOMA OF RECTUM (HCC): ICD-10-CM

## 2020-02-26 DIAGNOSIS — C20 ADENOCARCINOMA OF RECTUM (HCC): Primary | ICD-10-CM

## 2020-02-26 DIAGNOSIS — Z79.01 ANTICOAGULATION ADEQUATE: Primary | ICD-10-CM

## 2020-02-26 DIAGNOSIS — E83.42 HYPOMAGNESEMIA: ICD-10-CM

## 2020-02-26 DIAGNOSIS — E61.1 IRON DEFICIENCY: ICD-10-CM

## 2020-02-26 DIAGNOSIS — I26.99 OTHER PULMONARY EMBOLISM WITHOUT ACUTE COR PULMONALE, UNSPECIFIED CHRONICITY (HCC): ICD-10-CM

## 2020-02-26 LAB
ALBUMIN SERPL-MCNC: 3.3 G/DL (ref 3.5–5.2)
ALBUMIN/GLOB SERPL: 1 G/DL (ref 1.1–2.4)
ALP SERPL-CCNC: 104 U/L (ref 38–116)
ALT SERPL W P-5'-P-CCNC: 33 U/L (ref 0–33)
ANION GAP SERPL CALCULATED.3IONS-SCNC: 11.2 MMOL/L (ref 5–15)
AST SERPL-CCNC: 19 U/L (ref 0–32)
BASOPHILS # BLD AUTO: 0.02 10*3/MM3 (ref 0–0.2)
BASOPHILS NFR BLD AUTO: 0.6 % (ref 0–1.5)
BILIRUB SERPL-MCNC: 0.2 MG/DL (ref 0.2–1.2)
BUN BLD-MCNC: 12 MG/DL (ref 6–20)
BUN/CREAT SERPL: 16.7 (ref 7.3–30)
CALCIUM SPEC-SCNC: 8.7 MG/DL (ref 8.5–10.2)
CHLORIDE SERPL-SCNC: 104 MMOL/L (ref 98–107)
CO2 SERPL-SCNC: 21.8 MMOL/L (ref 22–29)
CREAT BLD-MCNC: 0.72 MG/DL (ref 0.6–1.1)
DEPRECATED RDW RBC AUTO: 56.7 FL (ref 37–54)
EOSINOPHIL # BLD AUTO: 0.19 10*3/MM3 (ref 0–0.4)
EOSINOPHIL NFR BLD AUTO: 5.4 % (ref 0.3–6.2)
ERYTHROCYTE [DISTWIDTH] IN BLOOD BY AUTOMATED COUNT: 19.1 % (ref 12.3–15.4)
GFR SERPL CREATININE-BSD FRML MDRD: 90 ML/MIN/1.73
GLOBULIN UR ELPH-MCNC: 3.4 GM/DL (ref 1.8–3.5)
GLUCOSE BLD-MCNC: 134 MG/DL (ref 74–124)
HCT VFR BLD AUTO: 39.4 % (ref 34–46.6)
HGB BLD-MCNC: 12.2 G/DL (ref 12–15.9)
IMM GRANULOCYTES # BLD AUTO: 0 10*3/MM3 (ref 0–0.05)
IMM GRANULOCYTES NFR BLD AUTO: 0 % (ref 0–0.5)
LYMPHOCYTES # BLD AUTO: 0.62 10*3/MM3 (ref 0.7–3.1)
LYMPHOCYTES NFR BLD AUTO: 17.6 % (ref 19.6–45.3)
MCH RBC QN AUTO: 27.3 PG (ref 26.6–33)
MCHC RBC AUTO-ENTMCNC: 31 G/DL (ref 31.5–35.7)
MCV RBC AUTO: 88.1 FL (ref 79–97)
MONOCYTES # BLD AUTO: 0.52 10*3/MM3 (ref 0.1–0.9)
MONOCYTES NFR BLD AUTO: 14.8 % (ref 5–12)
NEUTROPHILS # BLD AUTO: 2.17 10*3/MM3 (ref 1.7–7)
NEUTROPHILS NFR BLD AUTO: 61.6 % (ref 42.7–76)
NRBC BLD AUTO-RTO: 0 /100 WBC (ref 0–0.2)
PLATELET # BLD AUTO: 194 10*3/MM3 (ref 140–450)
PMV BLD AUTO: 8.8 FL (ref 6–12)
POTASSIUM BLD-SCNC: 3.9 MMOL/L (ref 3.5–4.7)
PROT SERPL-MCNC: 6.7 G/DL (ref 6.3–8)
RBC # BLD AUTO: 4.47 10*6/MM3 (ref 3.77–5.28)
SODIUM BLD-SCNC: 137 MMOL/L (ref 134–145)
WBC NRBC COR # BLD: 3.52 10*3/MM3 (ref 3.4–10.8)

## 2020-02-26 PROCEDURE — 96375 TX/PRO/DX INJ NEW DRUG ADDON: CPT

## 2020-02-26 PROCEDURE — 25010000002 FLUOROURACIL PER 500 MG: Performed by: INTERNAL MEDICINE

## 2020-02-26 PROCEDURE — 96416 CHEMO PROLONG INFUSE W/PUMP: CPT

## 2020-02-26 PROCEDURE — 25010000002 LEUCOVORIN CALCIUM PER 50 MG: Performed by: INTERNAL MEDICINE

## 2020-02-26 PROCEDURE — 25010000002 PALONOSETRON PER 25 MCG: Performed by: INTERNAL MEDICINE

## 2020-02-26 PROCEDURE — 96411 CHEMO IV PUSH ADDL DRUG: CPT

## 2020-02-26 PROCEDURE — 80053 COMPREHEN METABOLIC PANEL: CPT

## 2020-02-26 PROCEDURE — 25010000002 OXALIPLATIN PER 0.5 MG: Performed by: INTERNAL MEDICINE

## 2020-02-26 PROCEDURE — 96413 CHEMO IV INFUSION 1 HR: CPT

## 2020-02-26 PROCEDURE — 99215 OFFICE O/P EST HI 40 MIN: CPT | Performed by: INTERNAL MEDICINE

## 2020-02-26 PROCEDURE — 25010000002 DEXAMETHASONE SODIUM PHOSPHATE 100 MG/10ML SOLUTION: Performed by: INTERNAL MEDICINE

## 2020-02-26 PROCEDURE — 85025 COMPLETE CBC W/AUTO DIFF WBC: CPT

## 2020-02-26 PROCEDURE — 25010000002 FOSAPREPITANT PER 1 MG: Performed by: INTERNAL MEDICINE

## 2020-02-26 PROCEDURE — 96368 THER/DIAG CONCURRENT INF: CPT

## 2020-02-26 PROCEDURE — 96367 TX/PROPH/DG ADDL SEQ IV INF: CPT

## 2020-02-26 PROCEDURE — 96415 CHEMO IV INFUSION ADDL HR: CPT

## 2020-02-26 RX ORDER — DEXTROSE MONOHYDRATE 50 MG/ML
250 INJECTION, SOLUTION INTRAVENOUS ONCE
Status: COMPLETED | OUTPATIENT
Start: 2020-02-26 | End: 2020-02-26

## 2020-02-26 RX ORDER — PALONOSETRON 0.05 MG/ML
0.25 INJECTION, SOLUTION INTRAVENOUS ONCE
Status: COMPLETED | OUTPATIENT
Start: 2020-02-26 | End: 2020-02-26

## 2020-02-26 RX ORDER — PALONOSETRON 0.05 MG/ML
0.25 INJECTION, SOLUTION INTRAVENOUS ONCE
Status: CANCELLED | OUTPATIENT
Start: 2020-02-26

## 2020-02-26 RX ORDER — FLUOROURACIL 50 MG/ML
400 INJECTION, SOLUTION INTRAVENOUS ONCE
Status: CANCELLED | OUTPATIENT
Start: 2020-02-26

## 2020-02-26 RX ORDER — DEXTROSE MONOHYDRATE 50 MG/ML
250 INJECTION, SOLUTION INTRAVENOUS ONCE
Status: CANCELLED | OUTPATIENT
Start: 2020-02-26

## 2020-02-26 RX ORDER — DIPHENHYDRAMINE HYDROCHLORIDE 50 MG/ML
50 INJECTION INTRAMUSCULAR; INTRAVENOUS AS NEEDED
Status: CANCELLED | OUTPATIENT
Start: 2020-02-26

## 2020-02-26 RX ORDER — FAMOTIDINE 10 MG/ML
20 INJECTION, SOLUTION INTRAVENOUS AS NEEDED
Status: CANCELLED | OUTPATIENT
Start: 2020-02-26

## 2020-02-26 RX ORDER — FLUOROURACIL 50 MG/ML
400 INJECTION, SOLUTION INTRAVENOUS ONCE
Status: COMPLETED | OUTPATIENT
Start: 2020-02-26 | End: 2020-02-26

## 2020-02-26 RX ADMIN — FLUOROURACIL 4970 MG: 50 INJECTION, SOLUTION INTRAVENOUS at 11:34

## 2020-02-26 RX ADMIN — DEXAMETHASONE SODIUM PHOSPHATE 12 MG: 10 INJECTION, SOLUTION INTRAMUSCULAR; INTRAVENOUS at 09:17

## 2020-02-26 RX ADMIN — SODIUM CHLORIDE 100 ML: 9 INJECTION, SOLUTION INTRAVENOUS at 08:42

## 2020-02-26 RX ADMIN — FLUOROURACIL 830 MG: 50 INJECTION, SOLUTION INTRAVENOUS at 11:34

## 2020-02-26 RX ADMIN — PALONOSETRON 0.25 MG: 0.05 INJECTION, SOLUTION INTRAVENOUS at 08:42

## 2020-02-26 RX ADMIN — OXALIPLATIN 175 MG: 100 INJECTION, SOLUTION, CONCENTRATE INTRAVENOUS at 09:36

## 2020-02-26 RX ADMIN — DEXTROSE MONOHYDRATE 250 ML: 5 INJECTION, SOLUTION INTRAVENOUS at 08:42

## 2020-02-26 RX ADMIN — LEUCOVORIN CALCIUM 830 MG: 350 INJECTION, POWDER, LYOPHILIZED, FOR SOLUTION INTRAMUSCULAR; INTRAVENOUS at 09:35

## 2020-02-26 NOTE — PROGRESS NOTES
REASON FOR FOLLOW UP:     1. Newly diagnosed rectal cancer, rectal ultrasound examination reported T3N0 disease without lymphadenopathy. She does have small pulmonary nodule 6-7 mm and 2 mm with indeterminate feature. There is no solid evidence of metastatic disease otherwise. Patient has stage IIA (T3N0M0) disease.  2. The patient is heterozygous factor V Leiden, currently on prophylactic anticoagulation with Lovenox 40 mg daily given her increased risk of thrombosis with MediPort and GI malignancy.   3.  The 8 mm hypermetabolic right upper lobe pulmonary nodule is suspicious for metastatic as well.    4.  Patient had a PowerPort placement on the left upper chest by Dr. Joseph on 8/9/2019.  5.  Patient was started on concurrent infusional 5-FU chemoradiation therapy on 8/12/2019, with planned complete surgical resection and further adjuvant chemotherapy with intention to cure the disease.   6. Patient underwent surgical resection of the colon on 12/2/2019 with Dr. Ye  7. Diarrhea related to therapy and radiation.   8. Patient here today in anticipation of cycle 1 day 1 of adjuvant FOLFOX 6.  9.  On 2/5/2020 FOLFOX 6 cycle 2 delayed secondary to neutropenia.  Patient was given 3 days of Granix injection.  After cycle #2 we planned 3 days of Granix with each cycle.    10.  Patient also intolerant of oral iron.  Patient received 2 doses of IV injectafer on 02/05/2020 and 02/12/2020.   10. 02/12/2020 Proceed with cycle #2 of FOLFOX 6 with 3 days of Granix.    HISTORY OF PRESENT ILLNESS:  The patient is a 39 y.o. female with the above medical history here today with lab review and in anticipation for cycle #3 of her FOLFOX 6 adjuvant chemotherapy. The patient is accompanied by her mother today, who helped with the history.     Of note, the patient struggled with significant nausea without any episodes of vomiting following cycle #1 of chemotherapy resulting in significant weight loss.  Patient was given Emend  after cycle 2 chemotherapy tomorrow and much improved nausea afterwards, she is up 12 pounds as her appetite has normalized.  She had received cycle 2 of FOLFOX 6 on 02/12/2020 along with 3 days of Granix.     Patient received 2 doses of IV injectafer on 02/05/2020 and 02/12/2020    She was having several loose stools per her colostomy at times and stools can be a pink tinge. However after she did consult Dr. Esparza she stated that this is normal. Patient has been hesitant to take Imodium due to prior history of constipation.  She was encouraged to try and take Imodium to relieve the loose stools.   She denies any infectious symptoms including fevers or chills.  She had the expected cold sensitivity related to the Oxaliplatin for about 3 days following treatment.  She still states that she is having cold sensitivity today and some neuropathy is noted in the hands and fingers.  She denies chills or myalgias.  She states that she is having symptoms of a mild rhinnorea with some blood tinged mucous and generalized weakness for the last week.  She denies any pain in her mouth.    Patient is still on Xarelto. No excess bleeding or bruising noted.    She reports that she is having GERD secondary to the chemotherapy. She denies taking Protonix but she states that she is taking Pepcid once daily for the symptoms.     Laboratory studies today, 02/26/2020, show mild leukocytopenia with WBC 3520, ANC 2170, normal platelets 194,000 and normal hemoglobin 12.2.    We will proceed ahead with cycle #3 adjuvant chemotherapy FOLFOX 6 regimen.     Past Medical History:   Diagnosis Date   • Abnormal Pap smear of cervix 02/02/1998    JULIUS I   • Anemia in pregnancy    • Anxiety    • Cervical lymphadenitis 06/06/2012    SEEN AT [facility]   • Chronic diarrhea    • Colon polyps     FOLLOWED BY DR. GERONIMO ESPARZA   • Depression    • Factor V Leiden (CMS/[facility])     DX 8-2-2019   • GERD (gastroesophageal reflux disease)    • Heart murmur     ?   •  Hematochezia    • Hemorrhoids    • History of chemotherapy     FOLLOWED BY DR. ALEXANDRU HUNT   • History of gestational diabetes    • History of pre-eclampsia    • History of radiation therapy     FOLLOWED BY DR. JAVON LEWIS   • History of TB skin testing 2009    TB Skin Test   • HPV in female    • IBS (irritable bowel syndrome)    • Infected insect bite of neck 2016    SEEN AT [facility]   • Irritable bowel syndrome    • Kidney stones 2007    SEEN AT [facility] ER   • Lumbar disc disease    • On anticoagulant therapy    • PIH (pregnancy induced hypertension)    • Pulmonary embolism (CMS/HCC) 2019    ADMITTED TO [facility]   • Rectal cancer (CMS/HCC) 2019    INVASIVE MODERATELY DIFFERENTIATED ADENOCARCINOMA, CHEMO AND XRT FINISHED 2019   • Right leg pain    • Right ureteral stone 10/01/2019    SEEN AT [facility] ER   • SAB (spontaneous ) 1996     A2-1 INDUCED   • Sepsis due to cellulitis (CMS/HCC) 2002    LEFT GREAT TOE, ADMITTED TO [facility]     Past Surgical History:   Procedure Laterality Date   • CHOLECYSTECTOMY N/A 10/10/2003    LAPAROSCOPIC WITH CHOLANGIOGRAM, DR. JAMEY TALAVERA AT [facility]   • COLON RESECTION N/A 2019    Procedure: laparoscopic low anterior colon resection with mobilization of splenic flexure and diverting loop ileostomy: ERAS;  Surgeon: Padmaja Ye MD;  Location: [facility];  Service: General   • COLONOSCOPY W/ POLYPECTOMY N/A 2019    15 MM TUBULOVILLOUS ADENOMA POLYP IN HEPATIC FLEXURE, 20 MMTUBULOVILLOUS ADENOMA WITH HIGH GRADE DYSPLASIA IN RECTOSIGMOID, 4 CM MASS IN MID RECTUM, PATH: INVASIVE MODERATELY DIFFERENTIATED ADENOCARCINOMA, DR. JENNIFER LI AT [facility]   • DILATATION AND EVACUATION N/A    • ENDOSCOPY N/A 2019    LA GRADE A ESOPHAGITIS, GASTRITIS, ALL BIOPSIES BENIGN, DR. JENNIFER LI AT [facility]   • PAP SMEAR N/A 2014   • SIGMOIDOSCOPY N/A 2019    INFILTRATIVE PARTIALLY  OBSTRUCTING LARGE RECTAL CANCER, AREA TATOOED, DR. PADMAJA ESPARZA AT [facility]   • SIGMOIDOSCOPY N/A 11/23/2019    AN ULCERATED PARTIALLY OBSTRUCTING MASS IN MID RECTUM, AREA TATTOOED, DR. PADMAJA ESPARZA AT [facility]   • TRANSRECTAL ULTRASOUND N/A 7/24/2019    Procedure: ULTRASOUND TRANSRECTAL;  Surgeon: Padmaja Esparza MD;  Location: [facility] ENDOSCOPY;  Service: General   • URETEROSCOPY LASER LITHOTRIPSY WITH STENT INSERTION Right 10/2019   • VAGINAL DELIVERY N/A 09/18/1998   • VENOUS ACCESS DEVICE (PORT) INSERTION Left 8/9/2019    Procedure: INSERTION VENOUS ACCESS DEVICE;  Surgeon: Sj Joseph MD;  Location: [facility] OR [facility]SC;  Service: General   • WISDOM TOOTH EXTRACTION  1993       HEMATOLOGIC/ONCOLOGIC HISTORY:      The patient reports she has intermittent rectal bleeding and urgency, mucous with her stool, starting sometime in 2016. At that time she was referred to GI service, and was diagnosed of irritable bowel syndrome. Nevertheless she had worsening urgency for bowel movement, and had a couple of incidents losing control of stool. She was recently seen by Roland Thorpe MD again and had colonoscopy and EGD exam on 07/08/2019. She was found having a circumferential rectal mass. According to the procedure note, the patient had a fungating circumferential bleeding 4 cm mass in the middle rectum, at distance between 13 cm and 17 cm from the anus. Mass was causing partial obstruction. There were also colon polyps found at the hepatic flexure and also at the rectosigmoid junction 23 cm from the anus. Both were resected and retrieved. EGD examination reported grade A esophagitis at the GE junction and patchy discontinuous erythema and aggravation of the mucosa without bleeding in the stomach body. There is normal mucosa of the duodenum. Pathology evaluation from this procedure reported moderately differentiated adenocarcinoma involving the rectal mass. The rectal sigmoid junction polyp was tubular/tubulovillous adenoma with  high grade dysplasia negative for invasive malignancy. The hepatic flexure polyp was also tubular/tubulovillous adenoma negative for high grade dysplasia or malignancy. The biopsy from the esophagus reports squamocolumnar mucosa with inflammatory changes suggestive of mild reflux esophagitis, negative for interstitial metaplasia. Gastric biopsy was benign and duodenal biopsy was also benign. There is no mention of H-pylori.     Patient was subsequently referred to colorectal surgeon Padmaja Ye MD for further evaluation. The patient had CT scan examination for chest, abdomen and pelvis requested by Dr. Ye and were done on 07/13/2019. The study reported no evidence of lymphadenopathy in the abdomen and pelvis. There was wall thickening of the rectosigmoid junction. Normal spleen, pancreas, adrenal glands and kidneys. There was fatty liver infiltration but no focal lymphatic lesions. There was a small 6-7 mm noncalcified nodule in the right upper lobe and a tiny 3 mm subpleural nodule in the right middle lobe. No mediastinal or hilar lymphadenopathy.     Dr. Ye performed staging rectal ultrasound examination. This study reported tumor penetrating through the muscularis propria as T3 disease; there were no lymph nodes identified.    She had a hospitalization in late September 2019 because of newly discovered pulmonary emboli.  She was on prophylactic Lovenox prior to the incident of PE.  Now she is on full dose Xarelto anticoagulation.  Patient reports no further chest pain dyspnea.  She denies bleeding or bruising.  During her hospitalization, she was seen by Dr. Ye, who plans to have surgery 8 to 12 weeks after finishing radiation therapy.  She finished her radiation on 9/19/2019.     I noticed patient also presented to the emergency room on 10/1/2019 complaining of right flank area pain.  I reviewed the images studies and indeed she has a very small 1 to 2 mm obstructing kidney stone in the UV junction.   Patient is still symptomatic with some pains and dysuria.  She denies fever sweating or chills.    Laboratory study on 10/7/2019 reported normal CBC including hemoglobin 13.1, platelets 301,000, WBC 6170 and ANC 4900 lymphocytes 590 monocytes 480.      The nurse reported malfunction of port-a-catheter on 10/7/2019.  Port study on 10/8/2019 showed fibrin sheath around the distal aspect of the Mediport catheter in the SVC. This does not appear to hinder injection, but does prevent aspiration at this time.    Patient underwent surgical resection of the colon on 12/2/2019 with Dr. Ye.  Pathology evaluation reported residual T3 disease, also 1 out of 14 lymph nodes positive for malignancy.  So this patient in either had at least stage IIIb disease (T3 N1 M0?).      Adjuvant chemotherapy FOLFOX 6 will be started on 1/22/2020.    Patient was here on 02/12/2020 for cycle 2 of her FOLFOX.  This is delayed x1 week secondary to neutropenia.  The patient ultimately received 3 days worth of Neupogen with recovery of her blood counts.  Of note, the patient struggled with significant nausea without any episodes of vomiting following cycle #1 of chemotherapy resulting in significant weight loss.  She is up 12 pounds over the last week as her appetite has normalized.  We will add Emend to her care plan.    She is having several loose stools per her colostomy and has been hesitant to take Imodium due to prior history of constipation.  I encouraged her to try this with a maximum of 8 tablets/day.  She denies any infectious symptoms including fevers or chills.  No excess bleeding or bruising noted.  She had the expected cold sensitivity related to the Oxaliplatin for about 3 days following treatment.    Labs from 02/12/2020 demonstrated total white blood cell count of 5.14, ANC of 3.06, hemoglobin of 11.2, platelets of 211,000.  She was found to be iron deficient last week and is intolerant to oral iron secondary to GI distress.   For this reason, she initiated IV iron therapy with Injectafer last week.  She had received her second dose 02/12/2020    MEDICATIONS    Current Outpatient Medications:   •  clonazePAM (KlonoPIN) 1 MG tablet, Take 1 tablet by mouth 3 (Three) Times a Day As Needed for Anxiety or Seizures., Disp: 90 tablet, Rfl: 1  •  escitalopram (LEXAPRO) 20 MG tablet, Take 1 tablet by mouth Daily., Disp: 90 tablet, Rfl: 3  •  famotidine (PEPCID) 20 MG tablet, TAKE 1 TABLET BY MOUTH TWICE A DAY, Disp: 60 tablet, Rfl: 1  •  Filgrastim-aafi (NIVESTYM) 480 MCG/0.8ML solution prefilled syringe, Inject 480 mcg as directed Daily. For 3 days after each chemotherapy cycle. Cycles are every 2 weeks., Disp: 4.8 mL, Rfl: 6  •  HYDROcodone-acetaminophen (NORCO) 7.5-325 MG per tablet, Take 1 tablet by mouth Every 4 (Four) Hours As Needed for Moderate Pain ., Disp: 180 tablet, Rfl: 0  •  Loratadine (CLARITIN) 10 MG capsule, Take  by mouth., Disp: , Rfl:   •  metoprolol tartrate (LOPRESSOR) 25 MG tablet, TAKE 1/2 TABLET BY MOUTH EVERY 12 (TWELVE) HOURS., Disp: 60 tablet, Rfl: 1  •  ondansetron (ZOFRAN) 8 MG tablet, Take 1 tablet by mouth 3 (Three) Times a Day As Needed for Nausea or Vomiting., Disp: 60 tablet, Rfl: 5  •  phenazopyridine (PYRIDIUM) 100 MG tablet, Take 1 tablet by mouth 3 (Three) Times a Day As Needed for Bladder Spasms., Disp: 120 tablet, Rfl: 1  •  prochlorperazine (COMPAZINE) 10 MG tablet, Take 1 tablet by mouth Every 6 (Six) Hours As Needed for Nausea or Vomiting., Disp: 30 tablet, Rfl: 2  •  rivaroxaban (XARELTO) 20 MG tablet, Take 1 tablet by mouth Daily. Start after finishing starter pack. (Patient taking differently: Take 20 mg by mouth Daily. PT HOLDING 48 HOURS PRIOR TO SURGERY), Disp: 30 tablet, Rfl: 11  No current facility-administered medications for this visit.     Facility-Administered Medications Ordered in Other Visits:   •  fluorouracil (ADRUCIL) 4,970 mg, sodium chloride 0.9 % 140.6 mL chemo infusion - FOR HOME  USE, 2,400 mg/m2 (Treatment Plan Recorded), Intravenous, Once, Dong Nguyen MD PhD  •  fluorouracil (ADRUCIL) chemo injection 830 mg, 400 mg/m2 (Treatment Plan Recorded), Intravenous, Once, Dong Nguyen MD PhD  •  leucovorin 830 mg in dextrose (D5W) 5 % 291.5 mL IVPB, 400 mg/m2 (Treatment Plan Recorded), Intravenous, Once, Dong Nguyen MD PhD, Last Rate: 158 mL/hr at 02/26/20 0935, 830 mg at 02/26/20 0935  •  OXALIplatin (ELOXATIN) 175 mg in dextrose (D5W) 5 % 285 mL chemo IVPB, 85 mg/m2 (Treatment Plan Recorded), Intravenous, Once, Dong Nguyen MD PhD, Last Rate: 150 mL/hr at 02/26/20 0936, 175 mg at 02/26/20 0936    ALLERGIES:   No Known Allergies    SOCIAL HISTORY:       Social History     Socioeconomic History   • Marital status:      Spouse name: Justus   • Number of children: 1   • Years of education: Not on file   • Highest education level:  College   Occupational History     Employer: [employer]   Tobacco Use   • Smoking status: Current Every Day Smoker     Packs/day: 1.50     Years: 20.00     Pack years: 30.00     Types: Electronic Cigarette   • Smokeless tobacco: Never Used   Substance and Sexual Activity   • Alcohol use: Yes     Comment: Rarely   • Drug use: No   • Sexual activity: Defer         FAMILY HISTORY:   Mother has positive factor V Leiden mutation, although she did not have thrombosis, mother also is coronary disease with stenting, she also is occasional bruising.  Maternal grandmother had DVT, she had multiple surgical procedures.  Patient mother's half-brother had metastatic colon cancer at diagnosis in his 50s.  Maternal great aunt has breast cancer.  Patient will follow his skin cancer in his 60s, exclusive.    REVIEW OF SYSTEMS:  Review of Systems   Constitutional: Negative for appetite change, chills, diaphoresis, fatigue and fever.   HENT: Positive for rhinorrhea. Negative for congestion, hearing loss, mouth sores, sore throat and trouble swallowing.    Eyes:  "Negative for visual disturbance.   Respiratory: Negative for cough, chest tightness, shortness of breath and wheezing.    Cardiovascular: Negative for chest pain, palpitations and leg swelling.   Gastrointestinal: Positive for diarrhea (see HPI ) and nausea (see hpi). Negative for abdominal distention, anal bleeding, constipation and vomiting.        See HPI    Endocrine: Positive for cold intolerance.   Genitourinary: Negative.  Negative for frequency, hematuria and urgency.   Musculoskeletal: Negative for arthralgias, back pain and joint swelling.   Skin: Negative.  Negative for rash and wound.   Allergic/Immunologic: Negative for immunocompromised state.   Neurological: Positive for weakness (generalized) and numbness (peripheral neuropathy in hands and fingers). Negative for dizziness, seizures, syncope, speech difficulty and headaches.   Hematological: Negative for adenopathy. Does not bruise/bleed easily.   Psychiatric/Behavioral: Negative for agitation, behavioral problems, confusion and suicidal ideas.          Vitals:    02/26/20 0800   BP: 134/88   Pulse: 88   Resp: 16   Temp: 98.2 °F (36.8 °C)   SpO2: 97%   Weight: 95.9 kg (211 lb 8 oz)   Height: 166 cm (65.35\")   PainSc: 0-No pain     Current Status 2/26/2020   ECOG score 0      PHYSICAL EXAM:    GENERAL:  Well-developed, well-nourished  female in no acute distress.  She is accompanied by her mother  SKIN:  Warm, dry without rashes, purpura or petechiae.  EYES:  Pupils equal, round and reactive to light.  EOMs intact.  Conjunctivae normal.  EARS:  Hearing intact.  NOSE:  No nasal discharge.  MOUTH:  Tongue is well-papillated; no stomatitis or ulcers.  Lips normal.  THROAT:  Oropharynx without lesions or exudates.  NECK:  Supple; no thyromegaly or masses.  LYMPHATICS:  No cervical, supraclavicular adenopathy.  CHEST:  Lungs clear to auscultation. Good airflow.  Normal respiratory effort.  CARDIAC:  Regular rate and rhythm without murmurs. Normal " S1,S2. Right upper chest port.   ABDOMEN:  Soft, nontender, colostomy in place.  EXTREMITIES:  No clubbing, cyanosis or edema.  NEUROLOGICAL:  Cranial Nerves II-XII grossly intact.  No focal neurological deficits.  PSYCHIATRIC:  Normal affect and mood.          RECENT LABS:  Lab Results   Component Value Date    WBC 3.52 02/26/2020    HGB 12.2 02/26/2020    HCT 39.4 02/26/2020    MCV 88.1 02/26/2020     02/26/2020     Lab Results   Component Value Date    NEUTROABS 2.17 02/26/2020     Lab Results   Component Value Date    CEA 0.84 01/22/2020     Glucose   Date Value Ref Range Status   02/26/2020 134 (H) 74 - 124 mg/dL Final     BUN   Date Value Ref Range Status   02/26/2020 12 6 - 20 mg/dL Final     Creatinine   Date Value Ref Range Status   02/26/2020 0.72 0.60 - 1.10 mg/dL Final     Sodium   Date Value Ref Range Status   02/26/2020 137 134 - 145 mmol/L Final     Potassium   Date Value Ref Range Status   02/26/2020 3.9 3.5 - 4.7 mmol/L Final     Chloride   Date Value Ref Range Status   02/26/2020 104 98 - 107 mmol/L Final     CO2   Date Value Ref Range Status   02/26/2020 21.8 (L) 22.0 - 29.0 mmol/L Final     Calcium   Date Value Ref Range Status   02/26/2020 8.7 8.5 - 10.2 mg/dL Final     Total Protein   Date Value Ref Range Status   02/26/2020 6.7 6.3 - 8.0 g/dL Final     Albumin   Date Value Ref Range Status   02/26/2020 3.30 (L) 3.50 - 5.20 g/dL Final     ALT (SGPT)   Date Value Ref Range Status   02/26/2020 33 0 - 33 U/L Final     AST (SGOT)   Date Value Ref Range Status   02/26/2020 19 0 - 32 U/L Final     Alkaline Phosphatase   Date Value Ref Range Status   02/26/2020 104 38 - 116 U/L Final     Total Bilirubin   Date Value Ref Range Status   02/26/2020 0.2 0.2 - 1.2 mg/dL Final     eGFR Non  Amer   Date Value Ref Range Status   02/26/2020 90 >60 mL/min/1.73 Final     BUN/Creatinine Ratio   Date Value Ref Range Status   02/26/2020 16.7 7.3 - 30.0 Final     Anion Gap   Date Value Ref Range Status    02/26/2020 11.2 5.0 - 15.0 mmol/L Final         Assessment/Plan    1.  Newly diagnosed rectal cancer, rectal ultrasound examination reported T3N0 disease without lymphadenopathy. CT scan of chest, abdomen and pelvis reported no lymphadenopathy in the abdomen, pelvis or chest. She does have small pulmonary nodule 6-7 mm and 2 mm with indeterminate feature. There is no solid evidence of metastatic disease otherwise.   · Based on the CT scan and rectal ultrasound imaging studies, this patient had stage IIA (T3N0M0) disease.    · She had PET scan examination on 8/8/2019 which reported a hypermetabolic right upper lobe pulmonary nodule 6 mm with SUV 5.6.  This is suspicious for metastatic disease.  This patient may have stage IV disease.   · She initiated concurrent radiation with continuous 5-FU on 8/12/2019.  · Patient finished concurrent chemoradiation therapy.  · Patient underwent surgical resection of the colon and diverting loop ileostomy on 12/2/2019 with Dr. Ye.  Pathology evaluation reported residual T3 disease, with 1 out of 14 lymph nodes positive for malignancy.  Certainly this patient has at least stage IIIb rectal cancer (T3 N1 M0?)  · On 1/22/2020 adjuvant chemotherapy FOLFOX 6 regimen initiated.    · On 2/5/2022 cycle #2 was delayed secondary to neutropenia.  She was given 3 days of Granix.   · 2/12/20.  The patient returns today for consideration of cycle #2 which was delayed secondary to neutropenia.  As noted above, she received 3 days of Granix with recovery of her blood counts.  She is feeling reasonably well today with restored appetite.  We have also added Emend to today's care plan and all remaining cycles of chemotherapy.    · 02/26/2020 and reports her nausea has improved after Emend was added.  Patient will receive cycle 3 of FOLFOX 6 today.  Plan to have CT scan examination for chest abdomen pelvis after cycle #6 FOLFOX 6 regimen.     2 .  Pulmonary emboli with background of positive  "factor V Leiden mutation   · The patient has heterozygous factor V Leiden mutation and therefore was on low-dose anticoagulation with Lovenox, 40 mg daily given her increased risk of thrombosis with MediPort and GI malignancy.    · Nevertheless this patient was found to having pulmonary emboli on 9/20/2019 despite low-dose Lovenox.  She had a brief hospitalization in late September 2019, she was started on full dose Xarelto anticoagulation per protocol.    · The patient continues on Xarelto and is tolerating this well without bleeding issues.    3.  Loose, frequent stools:   · Patient is s/p surgical resection of the rectum tumor and diverting loop ileostomy on 12/2/2019 with Dr. Ye.  · She reports having liquid stools in her colostomy bag.  She reports her liquid stools typically appear \"clear mustard yellow\" or dark.  · The patient has been hesitant to take Imodium due to fear of recurrent constipation.  We have encouraged her to try 1 tablet of Imodium with loose stools and abdominal cramping to see if this benefits her.  She is willing to try this.    4. Pelvic pain: Related to previous radiation therapy and the surgery for resection of the primary rectal cancer.  Stable at this point.  We will continue to monitor.  Improved.    5. Hypokalemia: She is currently taking 20 mg of potassium every other day.    · Her potassium is 4.4 on 1/22/2020.   · 2/12/20 patient's potassium level is 3.6   · Her potassium level is 3.9 today 02/26/2020.     6.  This patient has also strong family history for malignancy the patient had appointment with genetic counseling on September 3, 2019. NF1 [id number] 3C >T.    7.  Nausea: This does seem to be multifactorial with a large part due to anxiety.  The patient does continue on Lexapro at 20 mg daily as well as Klonopin 0.5 mg once daily as needed.  She is able to take this up to 2 times daily as needed and will try this over this next week.    · The patient has Zofran on hand and was " given a prescription for Compazine at her last office visit.  She had been taking them together and I have instructed her to alternate the Compazine with the Zofran.    · In addition to this, we have had Emend approved beginning with today's chemotherapy     8.  Mild anemia, improved since her surgery.  She also has a history of iron deficiency.  Iron studies reveal a saturation of just 10%.  She was started on oral iron but was unable to tolerate due to GI side effects.   · The patient will received her second daily dose of injectifer today on 02/12/2020    9.  Neutropenia secondary to chemotherapy.  · As noted above, the patient will receive 3 days of Granix begin on day 4 of each cycle of chemotherapy.  We obtained approval for self administration at home.  This will hopefully begin with cycle #3 of therapy      PLAN:  1. Proceed with cycle #3 of chemotherapy today with 3 days of Granix as noted above   2. Emend will be continued to care plan with all subsequent cycles   3. She will continue Xarelto full dose anticoagulation.  4. The patient was again encouraged to take Imodium for loose stools  5. Increase Pepcid 20 mg to twice daily.  6. Patient will continue follow-up with Dr. Ye.   7. The patient will return to see NP in 2 weeks and 4 weeks with continued treatment, labs per protocol and magnesium check.  8. She will return to see me in 6 weeks with continued treatment, labs per protocol and magnesium check.    9. Plan to have repeated CT scan for chest abdomen pelvis with both oral and IV contrast after cycle #6.  10. I have asked her to call with any new issues or concerns prior to her next visit.  She has verbalized understanding.    I discussed with the patient and her mother today.  Her mother helps her with history.     The patient is on drug therapy requiring extensive monitoring.  Additionally, we discussed control of her diarrhea and appropriate use of antiemetics    By signing my name here, I  Aric Smith, attest that all documentation on 02/26/20 at 9:59 AM has been prepared under the direction and in the presence of Dr. Alexandru Nguyen MD.    I reviewed the note scribed by Gordon Canas and made appropriate corrections.       ALEXANDRU NGUYEN M.D., Ph.D.    2/26/2020          CC: Minesh Ye MD

## 2020-02-28 ENCOUNTER — INFUSION (OUTPATIENT)
Dept: ONCOLOGY | Facility: HOSPITAL | Age: 40
End: 2020-02-28

## 2020-02-28 DIAGNOSIS — Z45.2 ENCOUNTER FOR FITTING AND ADJUSTMENT OF VASCULAR CATHETER: ICD-10-CM

## 2020-02-28 DIAGNOSIS — C20 ADENOCARCINOMA OF RECTUM (HCC): Primary | ICD-10-CM

## 2020-02-28 PROCEDURE — 25010000003 HEPARIN LOCK FLUCH PER 10 UNITS: Performed by: INTERNAL MEDICINE

## 2020-02-28 RX ORDER — HEPARIN SODIUM (PORCINE) LOCK FLUSH IV SOLN 100 UNIT/ML 100 UNIT/ML
500 SOLUTION INTRAVENOUS AS NEEDED
Status: DISCONTINUED | OUTPATIENT
Start: 2020-02-28 | End: 2020-02-28 | Stop reason: HOSPADM

## 2020-02-28 RX ORDER — SODIUM CHLORIDE 0.9 % (FLUSH) 0.9 %
10 SYRINGE (ML) INJECTION AS NEEDED
Status: DISCONTINUED | OUTPATIENT
Start: 2020-02-28 | End: 2020-02-28 | Stop reason: HOSPADM

## 2020-02-28 RX ORDER — HEPARIN SODIUM (PORCINE) LOCK FLUSH IV SOLN 100 UNIT/ML 100 UNIT/ML
500 SOLUTION INTRAVENOUS AS NEEDED
Status: CANCELLED | OUTPATIENT
Start: 2020-02-28

## 2020-02-28 RX ORDER — SODIUM CHLORIDE 0.9 % (FLUSH) 0.9 %
10 SYRINGE (ML) INJECTION AS NEEDED
Status: CANCELLED | OUTPATIENT
Start: 2020-02-28

## 2020-02-28 RX ADMIN — SODIUM CHLORIDE, PRESERVATIVE FREE 500 UNITS: 5 INJECTION INTRAVENOUS at 09:46

## 2020-02-28 RX ADMIN — Medication 10 ML: at 09:46

## 2020-03-03 RX ORDER — FAMOTIDINE 20 MG/1
TABLET, FILM COATED ORAL
Qty: 60 TABLET | Refills: 1 | Status: SHIPPED | OUTPATIENT
Start: 2020-03-03 | End: 2020-06-10

## 2020-03-11 ENCOUNTER — INFUSION (OUTPATIENT)
Dept: ONCOLOGY | Facility: HOSPITAL | Age: 40
End: 2020-03-11

## 2020-03-11 ENCOUNTER — OFFICE VISIT (OUTPATIENT)
Dept: ONCOLOGY | Facility: CLINIC | Age: 40
End: 2020-03-11

## 2020-03-11 VITALS
WEIGHT: 214.9 LBS | HEART RATE: 104 BPM | HEIGHT: 65 IN | TEMPERATURE: 98.2 F | SYSTOLIC BLOOD PRESSURE: 142 MMHG | DIASTOLIC BLOOD PRESSURE: 97 MMHG | BODY MASS INDEX: 35.81 KG/M2 | OXYGEN SATURATION: 98 % | RESPIRATION RATE: 18 BRPM

## 2020-03-11 DIAGNOSIS — C20 ADENOCARCINOMA OF RECTUM (HCC): Primary | ICD-10-CM

## 2020-03-11 DIAGNOSIS — C20 ADENOCARCINOMA OF RECTUM (HCC): ICD-10-CM

## 2020-03-11 DIAGNOSIS — Z45.2 ENCOUNTER FOR FITTING AND ADJUSTMENT OF VASCULAR CATHETER: ICD-10-CM

## 2020-03-11 DIAGNOSIS — R39.15 URINARY URGENCY: Primary | ICD-10-CM

## 2020-03-11 LAB
ALBUMIN SERPL-MCNC: 3.3 G/DL (ref 3.5–5.2)
ALBUMIN/GLOB SERPL: 0.9 G/DL (ref 1.1–2.4)
ALP SERPL-CCNC: 102 U/L (ref 38–116)
ALT SERPL W P-5'-P-CCNC: 27 U/L (ref 0–33)
ANION GAP SERPL CALCULATED.3IONS-SCNC: 13.5 MMOL/L (ref 5–15)
AST SERPL-CCNC: 19 U/L (ref 0–32)
BASOPHILS # BLD AUTO: 0.02 10*3/MM3 (ref 0–0.2)
BASOPHILS NFR BLD AUTO: 0.6 % (ref 0–1.5)
BILIRUB SERPL-MCNC: <0.2 MG/DL (ref 0.2–1.2)
BILIRUB UR QL STRIP: NEGATIVE
BUN BLD-MCNC: 8 MG/DL (ref 6–20)
BUN/CREAT SERPL: 10.8 (ref 7.3–30)
CALCIUM SPEC-SCNC: 9 MG/DL (ref 8.5–10.2)
CHLORIDE SERPL-SCNC: 101 MMOL/L (ref 98–107)
CLARITY UR: CLEAR
CO2 SERPL-SCNC: 23.5 MMOL/L (ref 22–29)
COLOR UR: YELLOW
CREAT BLD-MCNC: 0.74 MG/DL (ref 0.6–1.1)
DEPRECATED RDW RBC AUTO: 67.9 FL (ref 37–54)
EOSINOPHIL # BLD AUTO: 0.07 10*3/MM3 (ref 0–0.4)
EOSINOPHIL NFR BLD AUTO: 2.2 % (ref 0.3–6.2)
ERYTHROCYTE [DISTWIDTH] IN BLOOD BY AUTOMATED COUNT: 21.9 % (ref 12.3–15.4)
GFR SERPL CREATININE-BSD FRML MDRD: 87 ML/MIN/1.73
GLOBULIN UR ELPH-MCNC: 3.7 GM/DL (ref 1.8–3.5)
GLUCOSE BLD-MCNC: 139 MG/DL (ref 74–124)
GLUCOSE UR STRIP-MCNC: NEGATIVE MG/DL
HCT VFR BLD AUTO: 41.9 % (ref 34–46.6)
HGB BLD-MCNC: 13.1 G/DL (ref 12–15.9)
HGB UR QL STRIP.AUTO: ABNORMAL
IMM GRANULOCYTES # BLD AUTO: 0.02 10*3/MM3 (ref 0–0.05)
IMM GRANULOCYTES NFR BLD AUTO: 0.6 % (ref 0–0.5)
KETONES UR QL STRIP: NEGATIVE
LEUKOCYTE ESTERASE UR QL STRIP.AUTO: NEGATIVE
LYMPHOCYTES # BLD AUTO: 1.5 10*3/MM3 (ref 0.7–3.1)
LYMPHOCYTES NFR BLD AUTO: 48.1 % (ref 19.6–45.3)
MAGNESIUM SERPL-MCNC: 1.8 MG/DL (ref 1.8–2.5)
MCH RBC QN AUTO: 27.9 PG (ref 26.6–33)
MCHC RBC AUTO-ENTMCNC: 31.3 G/DL (ref 31.5–35.7)
MCV RBC AUTO: 89.3 FL (ref 79–97)
MONOCYTES # BLD AUTO: 0.49 10*3/MM3 (ref 0.1–0.9)
MONOCYTES NFR BLD AUTO: 15.7 % (ref 5–12)
NEUTROPHILS # BLD AUTO: 1.02 10*3/MM3 (ref 1.7–7)
NEUTROPHILS NFR BLD AUTO: 32.8 % (ref 42.7–76)
NITRITE UR QL STRIP: NEGATIVE
NRBC BLD AUTO-RTO: 0 /100 WBC (ref 0–0.2)
PH UR STRIP.AUTO: 5.5 [PH] (ref 4.5–8)
PLATELET # BLD AUTO: 180 10*3/MM3 (ref 140–450)
PMV BLD AUTO: 8.6 FL (ref 6–12)
POTASSIUM BLD-SCNC: 3.6 MMOL/L (ref 3.5–4.7)
PROT SERPL-MCNC: 7 G/DL (ref 6.3–8)
PROT UR QL STRIP: ABNORMAL
RBC # BLD AUTO: 4.69 10*6/MM3 (ref 3.77–5.28)
SODIUM BLD-SCNC: 138 MMOL/L (ref 134–145)
SP GR UR STRIP: >=1.03 (ref 1–1.03)
UROBILINOGEN UR QL STRIP: ABNORMAL
WBC NRBC COR # BLD: 3.12 10*3/MM3 (ref 3.4–10.8)

## 2020-03-11 PROCEDURE — 80053 COMPREHEN METABOLIC PANEL: CPT

## 2020-03-11 PROCEDURE — 96523 IRRIG DRUG DELIVERY DEVICE: CPT

## 2020-03-11 PROCEDURE — 83735 ASSAY OF MAGNESIUM: CPT

## 2020-03-11 PROCEDURE — 25010000003 HEPARIN LOCK FLUCH PER 10 UNITS: Performed by: INTERNAL MEDICINE

## 2020-03-11 PROCEDURE — 81003 URINALYSIS AUTO W/O SCOPE: CPT | Performed by: NURSE PRACTITIONER

## 2020-03-11 PROCEDURE — 99214 OFFICE O/P EST MOD 30 MIN: CPT | Performed by: NURSE PRACTITIONER

## 2020-03-11 PROCEDURE — 85025 COMPLETE CBC W/AUTO DIFF WBC: CPT

## 2020-03-11 RX ORDER — HEPARIN SODIUM (PORCINE) LOCK FLUSH IV SOLN 100 UNIT/ML 100 UNIT/ML
500 SOLUTION INTRAVENOUS AS NEEDED
Status: DISCONTINUED | OUTPATIENT
Start: 2020-03-11 | End: 2020-03-11 | Stop reason: HOSPADM

## 2020-03-11 RX ORDER — SODIUM CHLORIDE 0.9 % (FLUSH) 0.9 %
10 SYRINGE (ML) INJECTION AS NEEDED
Status: DISCONTINUED | OUTPATIENT
Start: 2020-03-11 | End: 2020-03-11 | Stop reason: HOSPADM

## 2020-03-11 RX ORDER — HEPARIN SODIUM (PORCINE) LOCK FLUSH IV SOLN 100 UNIT/ML 100 UNIT/ML
500 SOLUTION INTRAVENOUS AS NEEDED
Status: CANCELLED | OUTPATIENT
Start: 2020-03-11

## 2020-03-11 RX ORDER — SODIUM CHLORIDE 0.9 % (FLUSH) 0.9 %
10 SYRINGE (ML) INJECTION AS NEEDED
Status: CANCELLED | OUTPATIENT
Start: 2020-03-11

## 2020-03-11 RX ADMIN — SODIUM CHLORIDE, PRESERVATIVE FREE 10 ML: 5 INJECTION INTRAVENOUS at 10:27

## 2020-03-11 RX ADMIN — Medication 500 UNITS: at 10:28

## 2020-03-11 NOTE — PROGRESS NOTES
REASON FOR FOLLOW UP:     1. Newly diagnosed rectal cancer, rectal ultrasound examination reported T3N0 disease without lymphadenopathy. She does have small pulmonary nodule 6-7 mm and 2 mm with indeterminate feature. There is no solid evidence of metastatic disease otherwise. Patient has stage IIA (T3N0M0) disease.  2. The patient is heterozygous factor V Leiden, currently on prophylactic anticoagulation with Lovenox 40 mg daily given her increased risk of thrombosis with MediPort and GI malignancy.   3.  The 8 mm hypermetabolic right upper lobe pulmonary nodule is suspicious for metastatic as well.    4.  Patient had a PowerPort placement on the left upper chest by Dr. Joseph on 8/9/2019.  5.  Patient was started on concurrent infusional 5-FU chemoradiation therapy on 8/12/2019, with planned complete surgical resection and further adjuvant chemotherapy with intention to cure the disease.   6. Patient underwent surgical resection of the colon on 12/2/2019 with Dr. Ye  7. Diarrhea related to therapy and radiation.   8. Patient here today in anticipation of cycle 1 day 1 of adjuvant FOLFOX 6.  9.  On 2/5/2020 FOLFOX 6 cycle 2 delayed secondary to neutropenia.  Patient was given 3 days of Granix injection.  After cycle #2 we planned 3 days of Granix with each cycle.    10.  Patient also intolerant of oral iron.  Patient received 2 doses of IV injectafer on 02/05/2020 and 02/12/2020.   10. 02/12/2020 Proceed with cycle #2 of FOLFOX 6 with 3 days of Granix.  11.  3/11/2020 treatment postponed secondary to abdominal pain and occasional low-grade fevers.  CT scans ordered.    HISTORY OF PRESENT ILLNESS:  The patient is a 39 y.o. female with the above medical history here today for scheduled cycle 4 of her adjuvant FOLFOX 6.  She reports feeling poorly in the office today.  She has been experiencing low-grade fevers intermittently, fever max has been 100.3.  She denies congestion, shortness of breath or cough.   She does report some urinary urgency and low pelvic discomfort.  She denies blood in the urine or foul odor to the urine.  She has not had a period since presurgery. This is actually been ongoing for several weeks.  She explains that her abdominal pain since surgery has remained, and at times feels slightly worse.  Her ostomy output is stable.  She experiences frequent, mucousy stool, this has been discussed with her surgeon.  She denies pain correlating with specific foods.  Does report decreased nausea with the addition of Emend to her regimen.      CBC is stable although her white count is marginal with an ANC of 1.02.    Her appetite is good.  Her vital signs are stable, she is not febrile in the office today.  She denies swelling, skin changes, new numbness or tingling.    Past Medical History:   Diagnosis Date   • Abnormal Pap smear of cervix 02/02/1998    JULIUS I   • Anemia in pregnancy    • Anxiety    • Cervical lymphadenitis 06/06/2012    SEEN AT [facility] ER   • Chronic diarrhea    • Colon polyps     FOLLOWED BY DR. GERONIMO ESPARZA   • Depression    • Factor V Leiden (CMS/[facility])     DX 8-2-2019   • GERD (gastroesophageal reflux disease)    • Heart murmur     ?   • Hematochezia    • Hemorrhoids    • History of chemotherapy 2019    FOLLOWED BY DR. ALEXANDRU HUNT   • History of gestational diabetes    • History of pre-eclampsia    • History of radiation therapy 2019    FOLLOWED BY DR. JAVON LEWIS   • History of TB skin testing 01/17/2009    TB Skin Test   • HPV in female 1998   • IBS (irritable bowel syndrome)    • Infected insect bite of neck 05/27/2016    SEEN AT [facility]   • Irritable bowel syndrome    • Kidney stones 08/09/2007    SEEN AT [facility] ER   • Lumbar disc disease    • On anticoagulant therapy    • PIH (pregnancy induced hypertension) 1998   • Pulmonary embolism (CMS/[facility]) 09/20/2019    ADMITTED TO [facility]   • Rectal cancer (CMS/[facility]) 07/08/2019    INVASIVE MODERATELY DIFFERENTIATED ADENOCARCINOMA, CHEMO AND XRT  FINISHED 2019   • Right leg pain    • Right ureteral stone 10/01/2019    SEEN AT [facility] ER   • SAB (spontaneous ) 1996     A2-1 INDUCED   • Sepsis due to cellulitis (CMS/HCC) 2002    LEFT GREAT TOE, ADMITTED TO [facility]     Past Surgical History:   Procedure Laterality Date   • CHOLECYSTECTOMY N/A 10/10/2003    LAPAROSCOPIC WITH CHOLANGIOGRAM, DR. JAMEY TALAVERA AT [facility]   • COLON RESECTION N/A 2019    Procedure: laparoscopic low anterior colon resection with mobilization of splenic flexure and diverting loop ileostomy: ERAS;  Surgeon: Geronimo Ye MD;  Location: [facility] OR;  Service: General   • COLONOSCOPY W/ POLYPECTOMY N/A 2019    15 MM TUBULOVILLOUS ADENOMA POLYP IN HEPATIC FLEXURE, 20 MMTUBULOVILLOUS ADENOMA WITH HIGH GRADE DYSPLASIA IN RECTOSIGMOID, 4 CM MASS IN MID RECTUM, PATH: INVASIVE MODERATELY DIFFERENTIATED ADENOCARCINOMA, DR. JENNIFER LI AT [facility]   • DILATATION AND EVACUATION N/A    • ENDOSCOPY N/A 2019    LA GRADE A ESOPHAGITIS, GASTRITIS, ALL BIOPSIES BENIGN, DR. JENNIFER LI AT [facility]   • PAP SMEAR N/A 2014   • SIGMOIDOSCOPY N/A 2019    INFILTRATIVE PARTIALLY OBSTRUCTING LARGE RECTAL CANCER, AREA TATOOED, DR. GERONIMO YE AT [facility]   • SIGMOIDOSCOPY N/A 2019    AN ULCERATED PARTIALLY OBSTRUCTING MASS IN MID RECTUM, AREA TATTOOED, DR. GERONIMO YE AT [facility]   • TRANSRECTAL ULTRASOUND N/A 2019    Procedure: ULTRASOUND TRANSRECTAL;  Surgeon: Geronimo Ye MD;  Location: [facility] ENDOSCOPY;  Service: General   • URETEROSCOPY LASER LITHOTRIPSY WITH STENT INSERTION Right 10/2019   • VAGINAL DELIVERY N/A 1998   • VENOUS ACCESS DEVICE (PORT) INSERTION Left 2019    Procedure: INSERTION VENOUS ACCESS DEVICE;  Surgeon: Sj Joseph MD;  Location: [facility] OR [facility];  Service: General   • WISDOM TOOTH EXTRACTION         HEMATOLOGIC/ONCOLOGIC HISTORY:      The patient reports she has intermittent rectal  bleeding and urgency, mucous with her stool, starting sometime in 2016. At that time she was referred to GI service, and was diagnosed of irritable bowel syndrome. Nevertheless she had worsening urgency for bowel movement, and had a couple of incidents losing control of stool. She was recently seen by Roland Thorpe MD again and had colonoscopy and EGD exam on 07/08/2019. She was found having a circumferential rectal mass. According to the procedure note, the patient had a fungating circumferential bleeding 4 cm mass in the middle rectum, at distance between 13 cm and 17 cm from the anus. Mass was causing partial obstruction. There were also colon polyps found at the hepatic flexure and also at the rectosigmoid junction 23 cm from the anus. Both were resected and retrieved. EGD examination reported grade A esophagitis at the GE junction and patchy discontinuous erythema and aggravation of the mucosa without bleeding in the stomach body. There is normal mucosa of the duodenum. Pathology evaluation from this procedure reported moderately differentiated adenocarcinoma involving the rectal mass. The rectal sigmoid junction polyp was tubular/tubulovillous adenoma with high grade dysplasia negative for invasive malignancy. The hepatic flexure polyp was also tubular/tubulovillous adenoma negative for high grade dysplasia or malignancy. The biopsy from the esophagus reports squamocolumnar mucosa with inflammatory changes suggestive of mild reflux esophagitis, negative for interstitial metaplasia. Gastric biopsy was benign and duodenal biopsy was also benign. There is no mention of H-pylori.     Patient was subsequently referred to colorectal surgeon Padmaja Ye MD for further evaluation. The patient had CT scan examination for chest, abdomen and pelvis requested by Dr. Ye and were done on 07/13/2019. The study reported no evidence of lymphadenopathy in the abdomen and pelvis. There was wall thickening of the rectosigmoid  junction. Normal spleen, pancreas, adrenal glands and kidneys. There was fatty liver infiltration but no focal lymphatic lesions. There was a small 6-7 mm noncalcified nodule in the right upper lobe and a tiny 3 mm subpleural nodule in the right middle lobe. No mediastinal or hilar lymphadenopathy.     Dr. Ye performed staging rectal ultrasound examination. This study reported tumor penetrating through the muscularis propria as T3 disease; there were no lymph nodes identified.    She had a hospitalization in late September 2019 because of newly discovered pulmonary emboli.  She was on prophylactic Lovenox prior to the incident of PE.  Now she is on full dose Xarelto anticoagulation.  Patient reports no further chest pain dyspnea.  She denies bleeding or bruising.  During her hospitalization, she was seen by Dr. Ye, who plans to have surgery 8 to 12 weeks after finishing radiation therapy.  She finished her radiation on 9/19/2019.     I noticed patient also presented to the emergency room on 10/1/2019 complaining of right flank area pain.  I reviewed the images studies and indeed she has a very small 1 to 2 mm obstructing kidney stone in the UV junction.  Patient is still symptomatic with some pains and dysuria.  She denies fever sweating or chills.    Laboratory study on 10/7/2019 reported normal CBC including hemoglobin 13.1, platelets 301,000, WBC 6170 and ANC 4900 lymphocytes 590 monocytes 480.      The nurse reported malfunction of port-a-catheter on 10/7/2019.  Port study on 10/8/2019 showed fibrin sheath around the distal aspect of the Mediport catheter in the SVC. This does not appear to hinder injection, but does prevent aspiration at this time.    Patient underwent surgical resection of the colon on 12/2/2019 with Dr. Ye.  Pathology evaluation reported residual T3 disease, also 1 out of 14 lymph nodes positive for malignancy.  So this patient in either had at least stage IIIb disease (T3 N1 M0?).       Adjuvant chemotherapy FOLFOX 6 will be started on 1/22/2020.    Patient was here on 02/12/2020 for cycle 2 of her FOLFOX.  This is delayed x1 week secondary to neutropenia.  The patient ultimately received 3 days worth of Neupogen with recovery of her blood counts.  Of note, the patient struggled with significant nausea without any episodes of vomiting following cycle #1 of chemotherapy resulting in significant weight loss.  She is up 12 pounds over the last week as her appetite has normalized.  We will add Emend to her care plan.    She is having several loose stools per her colostomy and has been hesitant to take Imodium due to prior history of constipation.  I encouraged her to try this with a maximum of 8 tablets/day.  She denies any infectious symptoms including fevers or chills.  No excess bleeding or bruising noted.  She had the expected cold sensitivity related to the Oxaliplatin for about 3 days following treatment.    Labs from 02/12/2020 demonstrated total white blood cell count of 5.14, ANC of 3.06, hemoglobin of 11.2, platelets of 211,000.  She was found to be iron deficient last week and is intolerant to oral iron secondary to GI distress.  For this reason, she initiated IV iron therapy with Injectafer last week.  She had received her second dose 02/12/2020    MEDICATIONS    Current Outpatient Medications:   •  clonazePAM (KlonoPIN) 1 MG tablet, Take 1 tablet by mouth 3 (Three) Times a Day As Needed for Anxiety or Seizures., Disp: 90 tablet, Rfl: 1  •  escitalopram (LEXAPRO) 20 MG tablet, Take 1 tablet by mouth Daily., Disp: 90 tablet, Rfl: 3  •  famotidine (PEPCID) 20 MG tablet, TAKE 1 TABLET BY MOUTH TWICE A DAY, Disp: 60 tablet, Rfl: 1  •  Filgrastim-aafi (NIVESTYM) 480 MCG/0.8ML solution prefilled syringe, Inject 480 mcg as directed Daily. For 3 days after each chemotherapy cycle. Cycles are every 2 weeks., Disp: 4.8 mL, Rfl: 6  •  HYDROcodone-acetaminophen (NORCO) 7.5-325 MG per tablet, Take 1  tablet by mouth Every 4 (Four) Hours As Needed for Moderate Pain ., Disp: 180 tablet, Rfl: 0  •  Loratadine (CLARITIN) 10 MG capsule, Take  by mouth., Disp: , Rfl:   •  metoprolol tartrate (LOPRESSOR) 25 MG tablet, TAKE 1/2 TABLET BY MOUTH EVERY 12 (TWELVE) HOURS., Disp: 60 tablet, Rfl: 1  •  ondansetron (ZOFRAN) 8 MG tablet, Take 1 tablet by mouth 3 (Three) Times a Day As Needed for Nausea or Vomiting., Disp: 60 tablet, Rfl: 5  •  phenazopyridine (PYRIDIUM) 100 MG tablet, Take 1 tablet by mouth 3 (Three) Times a Day As Needed for Bladder Spasms., Disp: 120 tablet, Rfl: 1  •  prochlorperazine (COMPAZINE) 10 MG tablet, Take 1 tablet by mouth Every 6 (Six) Hours As Needed for Nausea or Vomiting., Disp: 30 tablet, Rfl: 2  •  rivaroxaban (XARELTO) 20 MG tablet, Take 1 tablet by mouth Daily. Start after finishing starter pack. (Patient taking differently: Take 20 mg by mouth Daily. PT HOLDING 48 HOURS PRIOR TO SURGERY), Disp: 30 tablet, Rfl: 11    ALLERGIES:   No Known Allergies    SOCIAL HISTORY:       Social History     Socioeconomic History   • Marital status:      Spouse name: Justus   • Number of children: 1   • Years of education: Not on file   • Highest education level:  College   Occupational History     Employer: [employer]   Tobacco Use   • Smoking status: Current Every Day Smoker     Packs/day: 1.50     Years: 20.00     Pack years: 30.00     Types: Electronic Cigarette   • Smokeless tobacco: Never Used   Substance and Sexual Activity   • Alcohol use: Yes     Comment: Rarely   • Drug use: No   • Sexual activity: Defer         FAMILY HISTORY:   Mother has positive factor V Leiden mutation, although she did not have thrombosis, mother also is coronary disease with stenting, she also is occasional bruising.  Maternal grandmother had DVT, she had multiple surgical procedures.  Patient mother's half-brother had metastatic colon cancer at diagnosis in his 50s.  Maternal great aunt has breast cancer.   "Patient will follow his skin cancer in his 60s, exclusive.    REVIEW OF SYSTEMS:  Review of Systems   Constitutional: Positive for fatigue (progressice). Negative for appetite change, chills, diaphoresis and fever.   HENT: Negative for congestion, hearing loss, mouth sores, sore throat and trouble swallowing.    Eyes: Negative for visual disturbance.   Respiratory: Negative for cough, chest tightness, shortness of breath and wheezing.    Cardiovascular: Negative for chest pain, palpitations and leg swelling.   Gastrointestinal: Positive for abdominal pain and nausea (see hpi). Negative for abdominal distention, anal bleeding, constipation and vomiting. Rectal pain: see hpi.        See HPI    Endocrine: Positive for cold intolerance.   Genitourinary: Negative.  Negative for frequency, hematuria and urgency.   Musculoskeletal: Negative for arthralgias, back pain and joint swelling.   Skin: Negative.  Negative for rash and wound.   Allergic/Immunologic: Negative for immunocompromised state.   Neurological: Positive for weakness and numbness (Stable). Negative for dizziness, seizures, syncope, speech difficulty and headaches.   Hematological: Negative for adenopathy. Does not bruise/bleed easily.   Psychiatric/Behavioral: Negative for agitation, behavioral problems, confusion and suicidal ideas.          Vitals:    03/11/20 0912   Height: 166 cm (65.35\")     Current Status 2/26/2020   ECOG score 0      PHYSICAL EXAM:    GENERAL:  Well-developed, well-nourished  female in no acute distress.  She is accompanied by her mother  SKIN:  Warm, dry without rashes, purpura or petechiae.  EYES:  Pupils equal, round and reactive to light.  EOMs intact.  Conjunctivae normal.  EARS:  Hearing intact.  NOSE:  No nasal discharge.  MOUTH:  Tongue is well-papillated; no stomatitis or ulcers.  Lips normal.  THROAT:  Oropharynx without lesions or exudates.  NECK:  Supple; no thyromegaly or masses.  LYMPHATICS:  No cervical, " supraclavicular adenopathy.  CHEST:  Lungs clear to auscultation. Good airflow.  Normal respiratory effort.  CARDIAC:  Regular rate and rhythm without murmurs. Normal S1,S2. Right upper chest port.   ABDOMEN: Tenderness with palpation of the lower quadrants bilaterally, upper pelvic tenderness, no swelling, ecchymosis.  Well-healed scars.  Ostomy in place with moderate brown/yellow fecal matter.   EXTREMITIES:  No clubbing, cyanosis or edema.  NEUROLOGICAL:  Cranial Nerves II-XII grossly intact.  No focal neurological deficits.  PSYCHIATRIC:  Normal affect and mood.          RECENT LABS:  Lab Results   Component Value Date    WBC 3.52 02/26/2020    HGB 12.2 02/26/2020    HCT 39.4 02/26/2020    MCV 88.1 02/26/2020     02/26/2020     Lab Results   Component Value Date    NEUTROABS 2.17 02/26/2020     Lab Results   Component Value Date    CEA 0.84 01/22/2020     Glucose   Date Value Ref Range Status   02/26/2020 134 (H) 74 - 124 mg/dL Final     BUN   Date Value Ref Range Status   02/26/2020 12 6 - 20 mg/dL Final     Creatinine   Date Value Ref Range Status   02/26/2020 0.72 0.60 - 1.10 mg/dL Final     Sodium   Date Value Ref Range Status   02/26/2020 137 134 - 145 mmol/L Final     Potassium   Date Value Ref Range Status   02/26/2020 3.9 3.5 - 4.7 mmol/L Final     Chloride   Date Value Ref Range Status   02/26/2020 104 98 - 107 mmol/L Final     CO2   Date Value Ref Range Status   02/26/2020 21.8 (L) 22.0 - 29.0 mmol/L Final     Calcium   Date Value Ref Range Status   02/26/2020 8.7 8.5 - 10.2 mg/dL Final     Total Protein   Date Value Ref Range Status   02/26/2020 6.7 6.3 - 8.0 g/dL Final     Albumin   Date Value Ref Range Status   02/26/2020 3.30 (L) 3.50 - 5.20 g/dL Final     ALT (SGPT)   Date Value Ref Range Status   02/26/2020 33 0 - 33 U/L Final     AST (SGOT)   Date Value Ref Range Status   02/26/2020 19 0 - 32 U/L Final     Alkaline Phosphatase   Date Value Ref Range Status   02/26/2020 104 38 - 116 U/L  Final     Total Bilirubin   Date Value Ref Range Status   02/26/2020 0.2 0.2 - 1.2 mg/dL Final     eGFR Non  Amer   Date Value Ref Range Status   02/26/2020 90 >60 mL/min/1.73 Final     BUN/Creatinine Ratio   Date Value Ref Range Status   02/26/2020 16.7 7.3 - 30.0 Final     Anion Gap   Date Value Ref Range Status   02/26/2020 11.2 5.0 - 15.0 mmol/L Final     Results for JOHANNA GONZALEZ (MRN [id number]) as of 3/11/2020 14:29   Ref. Range 3/11/2020 09:59   Color, UA Latest Ref Range: Yellow, Straw  Yellow   Appearance, UA Latest Ref Range: Clear  Clear   Specific Stuart, UA Latest Ref Range: 1.002 - 1.030  >=1.030   pH, UA Latest Ref Range: 4.5 - 8.0  5.5   Glucose Latest Ref Range: Negative  Negative   Ketones, UA Latest Ref Range: Negative  Negative   Blood, UA Latest Ref Range: Negative  Small (1+) (A)   Nitrite, UA Latest Ref Range: Negative  Negative   Leukocytes, UA Latest Ref Range: Negative  Negative   Protein, UA Latest Ref Range: Negative  100 mg/dL (2+) (A)   Bilirubin, UA Latest Ref Range: Negative  Negative   Urobilinogen, UA Latest Ref Range: 0.2 - 1.0 E.U./dL  0.2 E.U./dL     .  Assessment/Plan    1.  Newly diagnosed rectal cancer, rectal ultrasound examination reported T3N0 disease without lymphadenopathy. CT scan of chest, abdomen and pelvis reported no lymphadenopathy in the abdomen, pelvis or chest. She does have small pulmonary nodule 6-7 mm and 2 mm with indeterminate feature. There is no solid evidence of metastatic disease otherwise.   · Based on the CT scan and rectal ultrasound imaging studies, this patient had stage IIA (T3N0M0) disease.    · She had PET scan examination on 8/8/2019 which reported a hypermetabolic right upper lobe pulmonary nodule 6 mm with SUV 5.6.  This is suspicious for metastatic disease.  This patient may have stage IV disease.   · She initiated concurrent radiation with continuous 5-FU on 8/12/2019.  · Patient finished concurrent chemoradiation  therapy.  · Patient underwent surgical resection of the colon and diverting loop ileostomy on 12/2/2019 with Dr. Ye.  Pathology evaluation reported residual T3 disease, with 1 out of 14 lymph nodes positive for malignancy.  Certainly this patient has at least stage IIIb rectal cancer (T3 N1 M0?)  · On 1/22/2020 adjuvant chemotherapy FOLFOX 6 regimen initiated.    · On 2/5/2022 cycle #2 was delayed secondary to neutropenia.  She was given 3 days of Granix.   · 2/12/20.  The patient returns today for consideration of cycle #2 which was delayed secondary to neutropenia.  As noted above, she received 3 days of Granix with recovery of her blood counts.  She is feeling reasonably well today with restored appetite.  We have also added Emend to today's care plan and all remaining cycles of chemotherapy.    · 02/26/2020 and reports her nausea has improved after Emend was added.  Patient will receive cycle 3 of FOLFOX 6.  Plan to have CT scan examination for chest abdomen pelvis after cycle #6 FOLFOX 6 regimen.   · 3/11/2020 due for cycle 4, however, she is experiencing progressive abdominal pain and occasional fevers.     2 .  Pulmonary emboli with background of positive factor V Leiden mutation   · The patient has heterozygous factor V Leiden mutation and therefore was on low-dose anticoagulation with Lovenox, 40 mg daily given her increased risk of thrombosis with MediPort and GI malignancy.    · Nevertheless this patient was found to having pulmonary emboli on 9/20/2019 despite low-dose Lovenox.  She had a brief hospitalization in late September 2019, she was started on full dose Xarelto anticoagulation per protocol.    · The patient continues on Xarelto and is tolerating this well without bleeding issues.    3.  Loose, frequent stools:   · Patient is s/p surgical resection of the rectum tumor and diverting loop ileostomy on 12/2/2019 with Dr. Ye.  · She reports having liquid stools in her colostomy bag.  She  "reports her liquid stools typically appear \"clear mustard yellow\" or dark.  · Loose stools continued, however, she does have abdominal pain which is become progressive.    4. Pelvic pain: Related to previous radiation therapy and the surgery for resection of the primary rectal cancer.  Stable at this point.  Worsening.    5. Hypokalemia: She is currently taking 20 mg of potassium every other day.    · Her potassium is 4.4 on 1/22/2020.   · 2/12/20 patient's potassium level is 3.6   · Her potassium level is 3.9 today 02/26/2020.   · Her potassium today is 3.6.    6.  This patient has also strong family history for malignancy the patient had appointment with genetic counseling on September 3, 2019. NF1 c587 3C >T.    7.  Nausea: This does seem to be multifactorial with a large part due to anxiety.  The patient does continue on Lexapro at 20 mg daily as well as Klonopin 0.5 mg once daily as needed.  She is able to take this up to 2 times daily as needed and will try this over this next week.    · The patient has Zofran on hand and was given a prescription for Compazine at her last office visit.  She had been taking them together and I have instructed her to alternate the Compazine with the Zofran.    · Emend was added with cycle 3 with much better management of nausea    8.  Mild anemia, improved since her surgery.  She also has a history of iron deficiency.  Iron studies reveal a saturation of just 10%.  She was started on oral iron but was unable to tolerate due to GI side effects.   · The patient will received her second daily dose of injectifer today on 02/12/2020  · Globin stable today at 13.1.    9.  Neutropenia secondary to chemotherapy.  · As noted above, the patient will receive 3 days of Granix begin on day 4 of each cycle of chemotherapy.  We obtained approval for self administration at home.  This will hopefully begin with cycle #3 of therapy  · Total white count today 3.12, ANC borderline at 1.02    10.  " Occasional fevers.  She is not febrile in the office today.      PLAN:  1.  I have reviewed the patient's symptoms with Dr. Nguyen. We agree that it is best to hold treatment today. I have ordered CT Scans of the chest, abdominal, and pelvis.   2. I have reviewed the patient's urinalysis today. I have reviewed signs and symptoms for which she needs to call the office.   3. We have pushed her existing  Appointments out one week with scan review next week in addition to the treatment that was scheduled today.

## 2020-03-13 ENCOUNTER — INFUSION (OUTPATIENT)
Dept: ONCOLOGY | Facility: HOSPITAL | Age: 40
End: 2020-03-13

## 2020-03-13 ENCOUNTER — HOSPITAL ENCOUNTER (OUTPATIENT)
Dept: PET IMAGING | Facility: HOSPITAL | Age: 40
Discharge: HOME | End: 2020-03-13
Admitting: NURSE PRACTITIONER

## 2020-03-13 DIAGNOSIS — C20 ADENOCARCINOMA OF RECTUM (HCC): ICD-10-CM

## 2020-03-13 LAB — CREAT BLDA-MCNC: 0.8 MG/DL (ref 0.6–1.3)

## 2020-03-13 PROCEDURE — 0 DIATRIZOATE MEGLUMINE & SODIUM PER 1 ML: Performed by: NURSE PRACTITIONER

## 2020-03-13 PROCEDURE — 74177 CT ABD & PELVIS W/CONTRAST: CPT

## 2020-03-13 PROCEDURE — 82565 ASSAY OF CREATININE: CPT

## 2020-03-13 PROCEDURE — 25010000002 IOPAMIDOL 61 % SOLUTION: Performed by: NURSE PRACTITIONER

## 2020-03-13 PROCEDURE — 71260 CT THORAX DX C+: CPT

## 2020-03-13 RX ADMIN — DIATRIZOATE MEGLUMINE AND DIATRIZOATE SODIUM 30 ML: 660; 100 LIQUID ORAL; RECTAL at 10:21

## 2020-03-13 RX ADMIN — IOPAMIDOL 85 ML: 612 INJECTION, SOLUTION INTRAVENOUS at 11:45

## 2020-03-13 NOTE — PROGRESS NOTES
PT'S PORT DOES NOT GIVE BLD RTN. EVEN W/ GOOD DYE STUDY DONE 1/22 CT WILL NOT USE HER PORT. THEY HAVE REQUESTED TO START IV FOR THE IV CONTRAST. DID NOT ACCESS PT'S PORT, SENT HER DOWNSTAIRS TO HAVE IV STARTED. PT AGREES W/ PLAN.

## 2020-03-16 ENCOUNTER — TELEPHONE (OUTPATIENT)
Dept: ONCOLOGY | Facility: CLINIC | Age: 40
End: 2020-03-16

## 2020-03-17 ENCOUNTER — TELEPHONE (OUTPATIENT)
Dept: ONCOLOGY | Facility: HOSPITAL | Age: 40
End: 2020-03-17

## 2020-03-17 ENCOUNTER — TELEPHONE (OUTPATIENT)
Dept: ONCOLOGY | Facility: CLINIC | Age: 40
End: 2020-03-17

## 2020-03-17 NOTE — TELEPHONE ENCOUNTER
Patient wanting CT results. Reviewed with Sandra and instructed to tell patient that no new disease. Patient verbalized understanding.

## 2020-03-18 ENCOUNTER — INFUSION (OUTPATIENT)
Dept: ONCOLOGY | Facility: HOSPITAL | Age: 40
End: 2020-03-18

## 2020-03-18 ENCOUNTER — OFFICE VISIT (OUTPATIENT)
Dept: ONCOLOGY | Facility: CLINIC | Age: 40
End: 2020-03-18

## 2020-03-18 VITALS
RESPIRATION RATE: 16 BRPM | SYSTOLIC BLOOD PRESSURE: 119 MMHG | HEART RATE: 87 BPM | OXYGEN SATURATION: 97 % | DIASTOLIC BLOOD PRESSURE: 85 MMHG | BODY MASS INDEX: 36.35 KG/M2 | TEMPERATURE: 97.7 F | WEIGHT: 218.2 LBS | HEIGHT: 65 IN

## 2020-03-18 DIAGNOSIS — C20 ADENOCARCINOMA OF RECTUM (HCC): ICD-10-CM

## 2020-03-18 DIAGNOSIS — C20 ADENOCARCINOMA OF RECTUM (HCC): Primary | ICD-10-CM

## 2020-03-18 LAB
ALBUMIN SERPL-MCNC: 3.5 G/DL (ref 3.5–5.2)
ALBUMIN/GLOB SERPL: 0.9 G/DL (ref 1.1–2.4)
ALP SERPL-CCNC: 95 U/L (ref 38–116)
ALT SERPL W P-5'-P-CCNC: 29 U/L (ref 0–33)
ANION GAP SERPL CALCULATED.3IONS-SCNC: 13 MMOL/L (ref 5–15)
AST SERPL-CCNC: 23 U/L (ref 0–32)
BASOPHILS # BLD AUTO: 0.04 10*3/MM3 (ref 0–0.2)
BASOPHILS NFR BLD AUTO: 1 % (ref 0–1.5)
BILIRUB SERPL-MCNC: 0.2 MG/DL (ref 0.2–1.2)
BUN BLD-MCNC: 12 MG/DL (ref 6–20)
BUN/CREAT SERPL: 15.4 (ref 7.3–30)
CALCIUM SPEC-SCNC: 8.9 MG/DL (ref 8.5–10.2)
CHLORIDE SERPL-SCNC: 102 MMOL/L (ref 98–107)
CO2 SERPL-SCNC: 23 MMOL/L (ref 22–29)
CREAT BLD-MCNC: 0.78 MG/DL (ref 0.6–1.1)
DEPRECATED RDW RBC AUTO: 72.2 FL (ref 37–54)
EOSINOPHIL # BLD AUTO: 0.15 10*3/MM3 (ref 0–0.4)
EOSINOPHIL NFR BLD AUTO: 3.6 % (ref 0.3–6.2)
ERYTHROCYTE [DISTWIDTH] IN BLOOD BY AUTOMATED COUNT: 22.5 % (ref 12.3–15.4)
GFR SERPL CREATININE-BSD FRML MDRD: 82 ML/MIN/1.73
GLOBULIN UR ELPH-MCNC: 3.7 GM/DL (ref 1.8–3.5)
GLUCOSE BLD-MCNC: 112 MG/DL (ref 74–124)
HCT VFR BLD AUTO: 42.9 % (ref 34–46.6)
HGB BLD-MCNC: 13.2 G/DL (ref 12–15.9)
IMM GRANULOCYTES # BLD AUTO: 0.02 10*3/MM3 (ref 0–0.05)
IMM GRANULOCYTES NFR BLD AUTO: 0.5 % (ref 0–0.5)
LYMPHOCYTES # BLD AUTO: 1.05 10*3/MM3 (ref 0.7–3.1)
LYMPHOCYTES NFR BLD AUTO: 25.3 % (ref 19.6–45.3)
MCH RBC QN AUTO: 27.8 PG (ref 26.6–33)
MCHC RBC AUTO-ENTMCNC: 30.8 G/DL (ref 31.5–35.7)
MCV RBC AUTO: 90.5 FL (ref 79–97)
MONOCYTES # BLD AUTO: 0.59 10*3/MM3 (ref 0.1–0.9)
MONOCYTES NFR BLD AUTO: 14.2 % (ref 5–12)
NEUTROPHILS # BLD AUTO: 2.3 10*3/MM3 (ref 1.7–7)
NEUTROPHILS NFR BLD AUTO: 55.4 % (ref 42.7–76)
NRBC BLD AUTO-RTO: 0 /100 WBC (ref 0–0.2)
PLATELET # BLD AUTO: 293 10*3/MM3 (ref 140–450)
PMV BLD AUTO: 8.2 FL (ref 6–12)
POTASSIUM BLD-SCNC: 4.1 MMOL/L (ref 3.5–4.7)
PROT SERPL-MCNC: 7.2 G/DL (ref 6.3–8)
RBC # BLD AUTO: 4.74 10*6/MM3 (ref 3.77–5.28)
SODIUM BLD-SCNC: 138 MMOL/L (ref 134–145)
WBC NRBC COR # BLD: 4.15 10*3/MM3 (ref 3.4–10.8)

## 2020-03-18 PROCEDURE — 96415 CHEMO IV INFUSION ADDL HR: CPT

## 2020-03-18 PROCEDURE — 96375 TX/PRO/DX INJ NEW DRUG ADDON: CPT

## 2020-03-18 PROCEDURE — 96416 CHEMO PROLONG INFUSE W/PUMP: CPT

## 2020-03-18 PROCEDURE — 96368 THER/DIAG CONCURRENT INF: CPT

## 2020-03-18 PROCEDURE — 25010000002 PALONOSETRON PER 25 MCG: Performed by: NURSE PRACTITIONER

## 2020-03-18 PROCEDURE — 25010000002 FOSAPREPITANT PER 1 MG: Performed by: NURSE PRACTITIONER

## 2020-03-18 PROCEDURE — 25010000002 OXALIPLATIN PER 0.5 MG: Performed by: NURSE PRACTITIONER

## 2020-03-18 PROCEDURE — 25010000002 LEUCOVORIN CALCIUM PER 50 MG: Performed by: NURSE PRACTITIONER

## 2020-03-18 PROCEDURE — 25010000002 FLUOROURACIL PER 500 MG: Performed by: NURSE PRACTITIONER

## 2020-03-18 PROCEDURE — 96367 TX/PROPH/DG ADDL SEQ IV INF: CPT

## 2020-03-18 PROCEDURE — 25010000002 DEXAMETHASONE SODIUM PHOSPHATE 100 MG/10ML SOLUTION: Performed by: NURSE PRACTITIONER

## 2020-03-18 PROCEDURE — 96413 CHEMO IV INFUSION 1 HR: CPT

## 2020-03-18 PROCEDURE — 85025 COMPLETE CBC W/AUTO DIFF WBC: CPT

## 2020-03-18 PROCEDURE — 99214 OFFICE O/P EST MOD 30 MIN: CPT | Performed by: NURSE PRACTITIONER

## 2020-03-18 PROCEDURE — 96411 CHEMO IV PUSH ADDL DRUG: CPT

## 2020-03-18 PROCEDURE — 80053 COMPREHEN METABOLIC PANEL: CPT

## 2020-03-18 RX ORDER — FLUOROURACIL 50 MG/ML
400 INJECTION, SOLUTION INTRAVENOUS ONCE
Status: CANCELLED | OUTPATIENT
Start: 2020-03-18

## 2020-03-18 RX ORDER — PALONOSETRON 0.05 MG/ML
0.25 INJECTION, SOLUTION INTRAVENOUS ONCE
Status: COMPLETED | OUTPATIENT
Start: 2020-03-18 | End: 2020-03-18

## 2020-03-18 RX ORDER — FLUOROURACIL 50 MG/ML
400 INJECTION, SOLUTION INTRAVENOUS ONCE
Status: COMPLETED | OUTPATIENT
Start: 2020-03-18 | End: 2020-03-18

## 2020-03-18 RX ORDER — FAMOTIDINE 10 MG/ML
20 INJECTION, SOLUTION INTRAVENOUS AS NEEDED
Status: CANCELLED | OUTPATIENT
Start: 2020-03-18

## 2020-03-18 RX ORDER — DIPHENHYDRAMINE HYDROCHLORIDE 50 MG/ML
50 INJECTION INTRAMUSCULAR; INTRAVENOUS AS NEEDED
Status: CANCELLED | OUTPATIENT
Start: 2020-03-18

## 2020-03-18 RX ORDER — DEXTROSE MONOHYDRATE 50 MG/ML
250 INJECTION, SOLUTION INTRAVENOUS ONCE
Status: COMPLETED | OUTPATIENT
Start: 2020-03-18 | End: 2020-03-18

## 2020-03-18 RX ORDER — DEXTROSE MONOHYDRATE 50 MG/ML
250 INJECTION, SOLUTION INTRAVENOUS ONCE
Status: CANCELLED | OUTPATIENT
Start: 2020-03-18

## 2020-03-18 RX ORDER — PALONOSETRON 0.05 MG/ML
0.25 INJECTION, SOLUTION INTRAVENOUS ONCE
Status: CANCELLED | OUTPATIENT
Start: 2020-03-18

## 2020-03-18 RX ADMIN — FLUOROURACIL 4970 MG: 50 INJECTION, SOLUTION INTRAVENOUS at 13:29

## 2020-03-18 RX ADMIN — OXALIPLATIN 175 MG: 100 INJECTION, SOLUTION, CONCENTRATE INTRAVENOUS at 11:19

## 2020-03-18 RX ADMIN — FLUOROURACIL 830 MG: 50 INJECTION, SOLUTION INTRAVENOUS at 13:28

## 2020-03-18 RX ADMIN — LEUCOVORIN CALCIUM 830 MG: 350 INJECTION, POWDER, LYOPHILIZED, FOR SOLUTION INTRAMUSCULAR; INTRAVENOUS at 11:18

## 2020-03-18 RX ADMIN — SODIUM CHLORIDE 100 ML: 9 INJECTION, SOLUTION INTRAVENOUS at 10:25

## 2020-03-18 RX ADMIN — PALONOSETRON 0.25 MG: 0.05 INJECTION, SOLUTION INTRAVENOUS at 10:24

## 2020-03-18 RX ADMIN — DEXAMETHASONE SODIUM PHOSPHATE 12 MG: 10 INJECTION, SOLUTION INTRAMUSCULAR; INTRAVENOUS at 10:58

## 2020-03-18 RX ADMIN — DEXTROSE MONOHYDRATE 250 ML: 5 INJECTION, SOLUTION INTRAVENOUS at 10:23

## 2020-03-18 NOTE — PROGRESS NOTES
REASON FOR FOLLOW UP:     1. Newly diagnosed rectal cancer, rectal ultrasound examination reported T3N0 disease without lymphadenopathy. She does have small pulmonary nodule 6-7 mm and 2 mm with indeterminate feature. There is no solid evidence of metastatic disease otherwise. Patient has stage IIA (T3N0M0) disease.  2. The patient is heterozygous factor V Leiden, currently on prophylactic anticoagulation with Lovenox 40 mg daily given her increased risk of thrombosis with MediPort and GI malignancy.   3.  The 8 mm hypermetabolic right upper lobe pulmonary nodule is suspicious for metastatic as well.    4.  Patient had a PowerPort placement on the left upper chest by Dr. Joseph on 8/9/2019.  5.  Patient was started on concurrent infusional 5-FU chemoradiation therapy on 8/12/2019, with planned complete surgical resection and further adjuvant chemotherapy with intention to cure the disease.   6. Patient underwent surgical resection of the colon on 12/2/2019 with Dr. Ye  7. Diarrhea related to therapy and radiation.   8. Patient here today in anticipation of cycle 1 day 1 of adjuvant FOLFOX 6.  9.  On 2/5/2020 FOLFOX 6 cycle 2 delayed secondary to neutropenia.  Patient was given 3 days of Granix injection.  After cycle #2 we planned 3 days of Granix with each cycle.    10.  Patient also intolerant of oral iron.  Patient received 2 doses of IV injectafer on 02/05/2020 and 02/12/2020.   10. 02/12/2020 Proceed with cycle #2 of FOLFOX 6 with 3 days of Granix.  11.  3/11/2020 cycle 4 postponed secondary to abdominal pain and occasional low-grade fevers.  CT scans ordered.  12.  Cycle 4 resumed after CT scan revealed no evidence of disease.  There was evidence of possible vaginal canal fistula and patient referred to Dr. Ye.    HISTORY OF PRESENT ILLNESS:  The patient is a 39 y.o. female with the above medical history here today scheduled cycle 4.  She was delayed last week secondary to abdominal and pelvic  discomfort prompting a CT scan.  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  Implants are stable with no worsening symptoms or new symptoms.  She is urinate without issue.  She does report pelvic pressure but this is unchanged.  Her CBC is stable.  Her vital signs are stable.  She actually reports feeling quite well in the office and this is likely due to her week delay of chemotherapy.  She would like to proceed with treatment today.    Past Medical History:   Diagnosis Date   • Abnormal Pap smear of cervix 1998    JULIUS I   • Anemia in pregnancy    • Anxiety    • Cervical lymphadenitis 2012    SEEN AT [facility] ER   • Chronic diarrhea    • Colon polyps     FOLLOWED BY DR. GERONIMO ESPARZA   • Depression    • Factor V Leiden (CMS/[facility])     DX 2019   • GERD (gastroesophageal reflux disease)    • Heart murmur     ?   • Hematochezia    • Hemorrhoids    • History of chemotherapy     FOLLOWED BY DR. ALEXANDRU HUNT   • History of gestational diabetes    • History of pre-eclampsia    • History of radiation therapy     FOLLOWED BY DR. JAVON LEWIS   • History of TB skin testing 2009    TB Skin Test   • HPV in female    • IBS (irritable bowel syndrome)    • Infected insect bite of neck 2016    SEEN AT [facility]   • Irritable bowel syndrome    • Kidney stones 2007    SEEN AT [facility] ER   • Lumbar disc disease    • On anticoagulant therapy    • PIH (pregnancy induced hypertension)    • Pulmonary embolism (CMS/[facility]) 2019    ADMITTED TO [facility]   • Rectal cancer (CMS/[facility]) 2019    INVASIVE MODERATELY DIFFERENTIATED ADENOCARCINOMA, CHEMO AND XRT FINISHED 2019   • Right leg pain    • Right ureteral stone 10/01/2019    SEEN AT [facility] ER   • SAB (spontaneous ) 1996     A2-1 INDUCED   • Sepsis due to cellulitis (CMS/[facility]) 2002    LEFT GREAT TOE, ADMITTED TO [facility]     Past Surgical  History:   Procedure Laterality Date   • CHOLECYSTECTOMY N/A 10/10/2003    LAPAROSCOPIC WITH CHOLANGIOGRAM, DR. JAMEY TALAVERA AT [facility]   • COLON RESECTION N/A 12/2/2019    Procedure: laparoscopic low anterior colon resection with mobilization of splenic flexure and diverting loop ileostomy: ERAS;  Surgeon: Geronimo Ye MD;  Location: [facility] OR;  Service: General   • COLONOSCOPY W/ POLYPECTOMY N/A 07/08/2019    15 MM TUBULOVILLOUS ADENOMA POLYP IN HEPATIC FLEXURE, 20 MMTUBULOVILLOUS ADENOMA WITH HIGH GRADE DYSPLASIA IN RECTOSIGMOID, 4 CM MASS IN MID RECTUM, PATH: INVASIVE MODERATELY DIFFERENTIATED ADENOCARCINOMA, DR. JENNIFER LI AT [facility]   • DILATATION AND EVACUATION N/A 2009   • ENDOSCOPY N/A 07/08/2019    LA GRADE A ESOPHAGITIS, GASTRITIS, ALL BIOPSIES BENIGN, DR. JENNIFER LI AT [facility]   • PAP SMEAR N/A 01/24/2014   • SIGMOIDOSCOPY N/A 7/24/2019    INFILTRATIVE PARTIALLY OBSTRUCTING LARGE RECTAL CANCER, AREA TATOOED, DR. GERONIMO YE AT [facility]   • SIGMOIDOSCOPY N/A 11/23/2019    AN ULCERATED PARTIALLY OBSTRUCTING MASS IN MID RECTUM, AREA TATTOOED, DR. GERONIMO YE AT [facility]   • TRANSRECTAL ULTRASOUND N/A 7/24/2019    Procedure: ULTRASOUND TRANSRECTAL;  Surgeon: Geronimo Ye MD;  Location: [facility] ENDOSCOPY;  Service: General   • URETEROSCOPY LASER LITHOTRIPSY WITH STENT INSERTION Right 10/2019   • VAGINAL DELIVERY N/A 09/18/1998   • VENOUS ACCESS DEVICE (PORT) INSERTION Left 8/9/2019    Procedure: INSERTION VENOUS ACCESS DEVICE;  Surgeon: Sj Joseph MD;  Location: [facility] OR [facility];  Service: General   • WISDOM TOOTH EXTRACTION  1993       HEMATOLOGIC/ONCOLOGIC HISTORY:      The patient reports she has intermittent rectal bleeding and urgency, mucous with her stool, starting sometime in 2016. At that time she was referred to GI service, and was diagnosed of irritable bowel syndrome. Nevertheless she had worsening urgency for bowel movement, and had a couple of incidents  losing control of stool. She was recently seen by Roland Thorpe MD again and had colonoscopy and EGD exam on 07/08/2019. She was found having a circumferential rectal mass. According to the procedure note, the patient had a fungating circumferential bleeding 4 cm mass in the middle rectum, at distance between 13 cm and 17 cm from the anus. Mass was causing partial obstruction. There were also colon polyps found at the hepatic flexure and also at the rectosigmoid junction 23 cm from the anus. Both were resected and retrieved. EGD examination reported grade A esophagitis at the GE junction and patchy discontinuous erythema and aggravation of the mucosa without bleeding in the stomach body. There is normal mucosa of the duodenum. Pathology evaluation from this procedure reported moderately differentiated adenocarcinoma involving the rectal mass. The rectal sigmoid junction polyp was tubular/tubulovillous adenoma with high grade dysplasia negative for invasive malignancy. The hepatic flexure polyp was also tubular/tubulovillous adenoma negative for high grade dysplasia or malignancy. The biopsy from the esophagus reports squamocolumnar mucosa with inflammatory changes suggestive of mild reflux esophagitis, negative for interstitial metaplasia. Gastric biopsy was benign and duodenal biopsy was also benign. There is no mention of H-pylori.     Patient was subsequently referred to colorectal surgeon Padmaja Ye MD for further evaluation. The patient had CT scan examination for chest, abdomen and pelvis requested by Dr. Ye and were done on 07/13/2019. The study reported no evidence of lymphadenopathy in the abdomen and pelvis. There was wall thickening of the rectosigmoid junction. Normal spleen, pancreas, adrenal glands and kidneys. There was fatty liver infiltration but no focal lymphatic lesions. There was a small 6-7 mm noncalcified nodule in the right upper lobe and a tiny 3 mm subpleural nodule in the right middle  lobe. No mediastinal or hilar lymphadenopathy.     Dr. Ye performed staging rectal ultrasound examination. This study reported tumor penetrating through the muscularis propria as T3 disease; there were no lymph nodes identified.    She had a hospitalization in late September 2019 because of newly discovered pulmonary emboli.  She was on prophylactic Lovenox prior to the incident of PE.  Now she is on full dose Xarelto anticoagulation.  Patient reports no further chest pain dyspnea.  She denies bleeding or bruising.  During her hospitalization, she was seen by Dr. Ye, who plans to have surgery 8 to 12 weeks after finishing radiation therapy.  She finished her radiation on 9/19/2019.     I noticed patient also presented to the emergency room on 10/1/2019 complaining of right flank area pain.  I reviewed the images studies and indeed she has a very small 1 to 2 mm obstructing kidney stone in the UV junction.  Patient is still symptomatic with some pains and dysuria.  She denies fever sweating or chills.    Laboratory study on 10/7/2019 reported normal CBC including hemoglobin 13.1, platelets 301,000, WBC 6170 and ANC 4900 lymphocytes 590 monocytes 480.      The nurse reported malfunction of port-a-catheter on 10/7/2019.  Port study on 10/8/2019 showed fibrin sheath around the distal aspect of the Mediport catheter in the SVC. This does not appear to hinder injection, but does prevent aspiration at this time.    Patient underwent surgical resection of the colon on 12/2/2019 with Dr. Ye.  Pathology evaluation reported residual T3 disease, also 1 out of 14 lymph nodes positive for malignancy.  So this patient in either had at least stage IIIb disease (T3 N1 M0?).      Adjuvant chemotherapy FOLFOX 6 will be started on 1/22/2020.    Patient was here on 02/12/2020 for cycle 2 of her FOLFOX.  This is delayed x1 week secondary to neutropenia.  The patient ultimately received 3 days worth of Neupogen with recovery of  her blood counts.  Of note, the patient struggled with significant nausea without any episodes of vomiting following cycle #1 of chemotherapy resulting in significant weight loss.  She is up 12 pounds over the last week as her appetite has normalized.  We will add Emend to her care plan.    She is having several loose stools per her colostomy and has been hesitant to take Imodium due to prior history of constipation.  I encouraged her to try this with a maximum of 8 tablets/day.  She denies any infectious symptoms including fevers or chills.  No excess bleeding or bruising noted.  She had the expected cold sensitivity related to the Oxaliplatin for about 3 days following treatment.    Labs from 02/12/2020 demonstrated total white blood cell count of 5.14, ANC of 3.06, hemoglobin of 11.2, platelets of 211,000.  She was found to be iron deficient last week and is intolerant to oral iron secondary to GI distress.  For this reason, she initiated IV iron therapy with Injectafer last week.  She had received her second dose 02/12/2020    MEDICATIONS    Current Outpatient Medications:   •  clonazePAM (KlonoPIN) 1 MG tablet, Take 1 tablet by mouth 3 (Three) Times a Day As Needed for Anxiety or Seizures., Disp: 90 tablet, Rfl: 1  •  escitalopram (LEXAPRO) 20 MG tablet, Take 1 tablet by mouth Daily., Disp: 90 tablet, Rfl: 3  •  famotidine (PEPCID) 20 MG tablet, TAKE 1 TABLET BY MOUTH TWICE A DAY, Disp: 60 tablet, Rfl: 1  •  Filgrastim-aafi (NIVESTYM) 480 MCG/0.8ML solution prefilled syringe, Inject 480 mcg as directed Daily. For 3 days after each chemotherapy cycle. Cycles are every 2 weeks., Disp: 4.8 mL, Rfl: 6  •  HYDROcodone-acetaminophen (NORCO) 7.5-325 MG per tablet, Take 1 tablet by mouth Every 4 (Four) Hours As Needed for Moderate Pain ., Disp: 180 tablet, Rfl: 0  •  Loratadine (CLARITIN) 10 MG capsule, Take  by mouth., Disp: , Rfl:   •  metoprolol tartrate (LOPRESSOR) 25 MG tablet, TAKE 1/2 TABLET BY MOUTH EVERY 12  (TWELVE) HOURS., Disp: 60 tablet, Rfl: 1  •  ondansetron (ZOFRAN) 8 MG tablet, Take 1 tablet by mouth 3 (Three) Times a Day As Needed for Nausea or Vomiting., Disp: 60 tablet, Rfl: 5  •  phenazopyridine (PYRIDIUM) 100 MG tablet, Take 1 tablet by mouth 3 (Three) Times a Day As Needed for Bladder Spasms., Disp: 120 tablet, Rfl: 1  •  prochlorperazine (COMPAZINE) 10 MG tablet, Take 1 tablet by mouth Every 6 (Six) Hours As Needed for Nausea or Vomiting., Disp: 30 tablet, Rfl: 2  •  rivaroxaban (XARELTO) 20 MG tablet, Take 1 tablet by mouth Daily. Start after finishing starter pack. (Patient taking differently: Take 20 mg by mouth Daily. PT HOLDING 48 HOURS PRIOR TO SURGERY), Disp: 30 tablet, Rfl: 11    ALLERGIES:   No Known Allergies    SOCIAL HISTORY:       Social History     Socioeconomic History   • Marital status:      Spouse name: Justus   • Number of children: 1   • Years of education: Not on file   • Highest education level:  College   Occupational History     Employer: [employer]   Tobacco Use   • Smoking status: Current Every Day Smoker     Packs/day: 1.50     Years: 20.00     Pack years: 30.00     Types: Electronic Cigarette   • Smokeless tobacco: Never Used   Substance and Sexual Activity   • Alcohol use: Yes     Comment: Rarely   • Drug use: No   • Sexual activity: Defer         FAMILY HISTORY:   Mother has positive factor V Leiden mutation, although she did not have thrombosis, mother also is coronary disease with stenting, she also is occasional bruising.  Maternal grandmother had DVT, she had multiple surgical procedures.  Patient mother's half-brother had metastatic colon cancer at diagnosis in his 50s.  Maternal great aunt has breast cancer.  Patient will follow his skin cancer in his 60s, exclusive.    REVIEW OF SYSTEMS:  Review of Systems   Constitutional: Positive for fatigue (stable). Negative for appetite change, chills, diaphoresis and fever.   HENT: Negative for congestion, hearing  "loss, mouth sores, sore throat and trouble swallowing.    Eyes: Negative for visual disturbance.   Respiratory: Negative for cough, chest tightness, shortness of breath and wheezing.    Cardiovascular: Negative for chest pain, palpitations and leg swelling.   Gastrointestinal: Positive for abdominal pain (see hpi) and nausea (improved). Negative for abdominal distention, anal bleeding, constipation and vomiting. Rectal pain: see hpi.        See HPI    Endocrine: Positive for cold intolerance.   Genitourinary: Negative.  Negative for frequency, hematuria and urgency.   Musculoskeletal: Negative for arthralgias, back pain and joint swelling.   Skin: Negative.  Negative for rash and wound.   Allergic/Immunologic: Negative for immunocompromised state.   Neurological: Positive for numbness (Stable). Negative for dizziness, seizures, syncope, speech difficulty and headaches.   Hematological: Negative for adenopathy. Does not bruise/bleed easily.   Psychiatric/Behavioral: Negative for agitation, behavioral problems, confusion and suicidal ideas.          Vitals:    03/18/20 0854   BP: 119/85   Pulse: 87   Resp: 16   Temp: 97.7 °F (36.5 °C)   SpO2: 97%   Weight: 99 kg (218 lb 3.2 oz)   Height: 166 cm (65.35\")   PainSc: 0-No pain     Current Status 3/11/2020   ECOG score 0      PHYSICAL EXAM:    GENERAL:  Well-developed, well-nourished  female in no acute distress.  SKIN:  Warm, dry without rashes, purpura or petechiae.  EYES:  Pupils equal, round and reactive to light.  EOMs intact.  Conjunctivae normal.  EARS:  Hearing intact.  NOSE:  No nasal discharge.  MOUTH:  Tongue is well-papillated; no stomatitis or ulcers.  Lips normal.  THROAT:  Oropharynx without lesions or exudates.  NECK:  Supple; no thyromegaly or masses.  LYMPHATICS:  No cervical, supraclavicular adenopathy.  CHEST:  Lungs clear to auscultation. Good airflow.  Normal respiratory effort.  CARDIAC:  Regular rate and rhythm without murmurs. Normal " S1,S2. Right upper chest port.   ABDOMEN: Tenderness with palpation of the lower quadrants bilaterally, upper pelvic tenderness, no swelling, ecchymosis.  Well-healed scars.  Ostomy in place with moderate brown/yellow fecal matter.   EXTREMITIES:  No clubbing, cyanosis or edema.  NEUROLOGICAL:  Cranial Nerves II-XII grossly intact.  No focal neurological deficits.  PSYCHIATRIC:  Normal affect and mood.    Unchanged since prior visit.      RECENT LABS:  Lab Results   Component Value Date    WBC 4.15 03/18/2020    HGB 13.2 03/18/2020    HCT 42.9 03/18/2020    MCV 90.5 03/18/2020     03/18/2020     Lab Results   Component Value Date    NEUTROABS 2.30 03/18/2020     Lab Results   Component Value Date    CEA 0.84 01/22/2020     Glucose   Date Value Ref Range Status   03/11/2020 139 (H) 74 - 124 mg/dL Final     BUN   Date Value Ref Range Status   03/11/2020 8 6 - 20 mg/dL Final     Creatinine   Date Value Ref Range Status   03/13/2020 0.80 0.60 - 1.30 mg/dL Final     Comment:     Serial Number: [id number]:  [id number]     Sodium   Date Value Ref Range Status   03/11/2020 138 134 - 145 mmol/L Final     Potassium   Date Value Ref Range Status   03/11/2020 3.6 3.5 - 4.7 mmol/L Final     Chloride   Date Value Ref Range Status   03/11/2020 101 98 - 107 mmol/L Final     CO2   Date Value Ref Range Status   03/11/2020 23.5 22.0 - 29.0 mmol/L Final     Calcium   Date Value Ref Range Status   03/11/2020 9.0 8.5 - 10.2 mg/dL Final     Total Protein   Date Value Ref Range Status   03/11/2020 7.0 6.3 - 8.0 g/dL Final     Albumin   Date Value Ref Range Status   03/11/2020 3.30 (L) 3.50 - 5.20 g/dL Final     ALT (SGPT)   Date Value Ref Range Status   03/11/2020 27 0 - 33 U/L Final     AST (SGOT)   Date Value Ref Range Status   03/11/2020 19 0 - 32 U/L Final     Alkaline Phosphatase   Date Value Ref Range Status   03/11/2020 102 38 - 116 U/L Final     Total Bilirubin   Date Value Ref Range Status   03/11/2020 <0.2 (L) 0.2 - 1.2  mg/dL Final     eGFR Non  Amer   Date Value Ref Range Status   03/11/2020 87 >60 mL/min/1.73 Final     BUN/Creatinine Ratio   Date Value Ref Range Status   03/11/2020 10.8 7.3 - 30.0 Final     Anion Gap   Date Value Ref Range Status   03/11/2020 13.5 5.0 - 15.0 mmol/L Final     Results for JOHANNA GONZALEZ (MRN [id number]) as of 3/11/2020 14:29   Ref. Range 3/11/2020 09:59   Color, UA Latest Ref Range: Yellow, Straw  Yellow   Appearance, UA Latest Ref Range: Clear  Clear   Specific [place], UA Latest Ref Range: 1.002 - 1.030  >=1.030   pH, UA Latest Ref Range: 4.5 - 8.0  5.5   Glucose Latest Ref Range: Negative  Negative   Ketones, UA Latest Ref Range: Negative  Negative   Blood, UA Latest Ref Range: Negative  Small (1+) (A)   Nitrite, UA Latest Ref Range: Negative  Negative   Leukocytes, UA Latest Ref Range: Negative  Negative   Protein, UA Latest Ref Range: Negative  100 mg/dL (2+) (A)   Bilirubin, UA Latest Ref Range: Negative  Negative   Urobilinogen, UA Latest Ref Range: 0.2 - 1.0 E.U./dL  0.2 E.U./dL       CT CHEST, ABDOMEN, AND PELVIS WITH IV CONTRAST     HISTORY: 39-year-old female with resected rectal cancer. Status post low  anterior resection and diverting loop ileostomy on 12/02/2019. Evaluate  for metastatic disease.     TECHNIQUE: Radiation dose reduction techniques were utilized, including  automated exposure control and exposure modulation based on body size.   3 mm images were obtained through the chest, abdomen, and pelvis after  the administration of IV contrast. Compared with previous CTs from  10/21/2019.     FINDINGS:  CHEST: The 6 mm right upper lobe pulmonary nodule is stable and the  punctate pleural-based nodular density at the right lower lobe is stable  as well. There is a 5 mm left basilar pulmonary nodule which is stable,  image 73. There are no new pulmonary opacities and there are no pleural  or pericardial effusions. There is no lymphadenopathy within the chest.  The few shotty  mediastinal nodes are stable. Left MediPort catheter tip  is within the SVC.     ABDOMEN/PELVIS: There is a small presacral air-fluid collection which is  contiguous with the lumen of the anastomosed colon, measuring nearly 3  cm, image 139. There is also ill-defined surrounding fluid and there are  also bubbles of air within the vaginal canal. There are 2 separate  regions of what is likely fat necrosis within the omentum, at the  midline image 71 and in the left upper abdomen, image 65. There is no  lymphadenopathy. There has been an increase in the degree of fatty  infiltration of the liver. There is no suspicious liver lesion. The  spleen, pancreas, adrenals, and kidneys appear unremarkable. Uterus and  adnexa appear unremarkable.     IMPRESSION:  1. There is an approximately 3 cm air-fluid collection posterior to the  colonic anastomosis in the presacral region which is contiguous with the  lumen of the anastomosed colon. Bubbles of air within the vaginal canal  suggests the possibility of fistulization with the vaginal canal.  2. Stable pulmonary nodules. There is no lymphadenopathy or new  abnormality within the chest.  3. Increase in hepatic steatosis.         .  Assessment/Plan    1.  Newly diagnosed rectal cancer, rectal ultrasound examination reported T3N0 disease without lymphadenopathy. CT scan of chest, abdomen and pelvis reported no lymphadenopathy in the abdomen, pelvis or chest. She does have small pulmonary nodule 6-7 mm and 2 mm with indeterminate feature. There is no solid evidence of metastatic disease otherwise.   · Based on the CT scan and rectal ultrasound imaging studies, this patient had stage IIA (T3N0M0) disease.    · She had PET scan examination on 8/8/2019 which reported a hypermetabolic right upper lobe pulmonary nodule 6 mm with SUV 5.6.  This is suspicious for metastatic disease.  This patient may have stage IV disease.   · She initiated concurrent radiation with continuous 5-FU on  8/12/2019.  · Patient finished concurrent chemoradiation therapy.  · Patient underwent surgical resection of the colon and diverting loop ileostomy on 12/2/2019 with Dr. Ye.  Pathology evaluation reported residual T3 disease, with 1 out of 14 lymph nodes positive for malignancy.  Certainly this patient has at least stage IIIb rectal cancer (T3 N1 M0?)  · On 1/22/2020 adjuvant chemotherapy FOLFOX 6 regimen initiated.    · On 2/5/2022 cycle #2 was delayed secondary to neutropenia.  She was given 3 days of Granix.   · 2/12/20.  The patient returns today for consideration of cycle #2 which was delayed secondary to neutropenia.  As noted above, she received 3 days of Granix with recovery of her blood counts.  She is feeling reasonably well today with restored appetite.  We have also added Emend to today's care plan and all remaining cycles of chemotherapy.    · 02/26/2020 and reports her nausea has improved after Emend was added.  Patient will receive cycle 3 of FOLFOX 6.  Plan to have CT scan examination for chest abdomen pelvis after cycle #6 FOLFOX 6 regimen.   · 3/11/2020 due for cycle 4, however, she is experiencing progressive abdominal pain and occasional fevers.   · CT scan performed on March 13 reveals no evidence of progressive or recurrent disease.  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    2 .  Pulmonary emboli with background of positive factor V Leiden mutation   · The patient has heterozygous factor V Leiden mutation and therefore was on low-dose anticoagulation with Lovenox, 40 mg daily given her increased risk of thrombosis with MediPort and GI malignancy.    · Nevertheless this patient was found to having pulmonary emboli on 9/20/2019 despite low-dose Lovenox.  She had a brief hospitalization in late September 2019, she was started on full dose Xarelto anticoagulation per protocol.    · The patient continues on Xarelto and is tolerating this well without bleeding issues.    3.  Loose, frequent  "stools:   · Patient is s/p surgical resection of the rectum tumor and diverting loop ileostomy on 12/2/2019 with Dr. Ye.  · She reports having liquid stools in her colostomy bag.  She reports her liquid stools typically appear \"clear mustard yellow\" or dark.  · Loose stools continued, however, she does have abdominal pain which is become progressive.    4. Pelvic pain: Related to previous radiation therapy and the surgery for resection of the primary rectal cancer.  Stable see above.    5. Hypokalemia: She is currently taking 20 mg of potassium every other day.    · Her potassium is 4.4 on 1/22/2020.   · 2/12/20 patient's potassium level is 3.6   · Her potassium level is 3.9 today 02/26/2020.   · Her potassium today is 4.1.    6.  This patient has also strong family history for malignancy the patient had appointment with genetic counseling on September 3, 2019. NF1 [id number] 3C >T.    7.  Nausea: This does seem to be multifactorial with a large part due to anxiety.  The patient does continue on Lexapro at 20 mg daily as well as Klonopin 0.5 mg once daily as needed.  She is able to take this up to 2 times daily as needed and will try this over this next week.    · The patient has Zofran on hand and was given a prescription for Compazine at her last office visit.  She had been taking them together and I have instructed her to alternate the Compazine with the Zofran.    · Emend was added with cycle 3 with much better management of nausea    8.  Mild anemia, improved since her surgery.  She also has a history of iron deficiency.  Iron studies reveal a saturation of just 10%.  She was started on oral iron but was unable to tolerate due to GI side effects.   · The patient will received her second daily dose of injectifer today on 02/12/2020  · Globin stable today at 13.1.    9.  Neutropenia secondary to chemotherapy.  · As noted above, the patient will receive 3 days of Granix begin on day 4 of each cycle of chemotherapy.  " We obtained approval for self administration at home.  This will hopefully begin with cycle #3 of therapy  · Total white count today 3.12, ANC borderline at 1.02    10.  Occasional fevers.  She is not febrile in the office today.    11.  Vaginal fistula noted on CT scan above.  Dr. desouza and Dr. Ye have had the opportunity to review the patient's CT scan and feel comfortable with her proceeding with treatment today.    PLAN:  1.  I have reviewed the CT scan today with the patient.  We will go forward with cycle 4 at current doses with plans for Granix x3 days.  She has been scheduled to return in 2 weeks to see Patience Contreras April 1, 2020.  She will be due for cycle 5 at this time.  2.  I have referred the patient to Dr. Ye for further assessment of vaginal fistula.  3.  We did discuss signs and symptoms for which she needs to call the office or seek immediate medical attention.

## 2020-03-20 ENCOUNTER — INFUSION (OUTPATIENT)
Dept: ONCOLOGY | Facility: HOSPITAL | Age: 40
End: 2020-03-20

## 2020-03-20 DIAGNOSIS — Z45.2 ENCOUNTER FOR FITTING AND ADJUSTMENT OF VASCULAR CATHETER: Primary | ICD-10-CM

## 2020-03-20 DIAGNOSIS — C20 ADENOCARCINOMA OF RECTUM (HCC): ICD-10-CM

## 2020-03-20 PROCEDURE — 25010000003 HEPARIN LOCK FLUCH PER 10 UNITS: Performed by: INTERNAL MEDICINE

## 2020-03-20 RX ORDER — SODIUM CHLORIDE 0.9 % (FLUSH) 0.9 %
10 SYRINGE (ML) INJECTION AS NEEDED
Status: CANCELLED | OUTPATIENT
Start: 2020-03-20

## 2020-03-20 RX ORDER — SODIUM CHLORIDE 0.9 % (FLUSH) 0.9 %
10 SYRINGE (ML) INJECTION AS NEEDED
Status: DISCONTINUED | OUTPATIENT
Start: 2020-03-20 | End: 2020-03-20 | Stop reason: HOSPADM

## 2020-03-20 RX ORDER — HEPARIN SODIUM (PORCINE) LOCK FLUSH IV SOLN 100 UNIT/ML 100 UNIT/ML
500 SOLUTION INTRAVENOUS AS NEEDED
Status: DISCONTINUED | OUTPATIENT
Start: 2020-03-20 | End: 2020-03-20 | Stop reason: HOSPADM

## 2020-03-20 RX ORDER — HEPARIN SODIUM (PORCINE) LOCK FLUSH IV SOLN 100 UNIT/ML 100 UNIT/ML
500 SOLUTION INTRAVENOUS AS NEEDED
Status: CANCELLED | OUTPATIENT
Start: 2020-03-20

## 2020-03-20 RX ADMIN — Medication 500 UNITS: at 11:38

## 2020-03-20 RX ADMIN — SODIUM CHLORIDE, PRESERVATIVE FREE 10 ML: 5 INJECTION INTRAVENOUS at 11:37

## 2020-03-30 DIAGNOSIS — F41.9 ANXIETY: ICD-10-CM

## 2020-03-30 DIAGNOSIS — C20 ADENOCARCINOMA OF RECTUM (HCC): ICD-10-CM

## 2020-03-30 RX ORDER — CLONAZEPAM 1 MG/1
1 TABLET ORAL 3 TIMES DAILY PRN
Qty: 90 TABLET | Refills: 0 | Status: SHIPPED | OUTPATIENT
Start: 2020-03-30 | End: 2020-07-10

## 2020-03-30 RX ORDER — CLONAZEPAM 1 MG/1
1 TABLET ORAL 3 TIMES DAILY PRN
Qty: 90 TABLET | Refills: 0 | Status: SHIPPED | OUTPATIENT
Start: 2020-03-30 | End: 2020-03-30 | Stop reason: SDUPTHER

## 2020-04-01 ENCOUNTER — INFUSION (OUTPATIENT)
Dept: ONCOLOGY | Facility: HOSPITAL | Age: 40
End: 2020-04-01

## 2020-04-01 ENCOUNTER — OFFICE VISIT (OUTPATIENT)
Dept: ONCOLOGY | Facility: CLINIC | Age: 40
End: 2020-04-01

## 2020-04-01 VITALS
SYSTOLIC BLOOD PRESSURE: 130 MMHG | RESPIRATION RATE: 16 BRPM | TEMPERATURE: 98.2 F | DIASTOLIC BLOOD PRESSURE: 90 MMHG | OXYGEN SATURATION: 97 % | HEART RATE: 96 BPM | BODY MASS INDEX: 36.6 KG/M2 | WEIGHT: 219.7 LBS | HEIGHT: 65 IN

## 2020-04-01 DIAGNOSIS — T45.1X5A CHEMOTHERAPY-INDUCED NEUROPATHY (HCC): ICD-10-CM

## 2020-04-01 DIAGNOSIS — T45.1X5A CHEMOTHERAPY INDUCED NEUTROPENIA (HCC): ICD-10-CM

## 2020-04-01 DIAGNOSIS — C20 ADENOCARCINOMA OF RECTUM (HCC): Primary | ICD-10-CM

## 2020-04-01 DIAGNOSIS — C20 ADENOCARCINOMA OF RECTUM (HCC): ICD-10-CM

## 2020-04-01 DIAGNOSIS — D70.1 CHEMOTHERAPY INDUCED NEUTROPENIA (HCC): ICD-10-CM

## 2020-04-01 DIAGNOSIS — G62.0 CHEMOTHERAPY-INDUCED NEUROPATHY (HCC): ICD-10-CM

## 2020-04-01 DIAGNOSIS — I26.99 OTHER PULMONARY EMBOLISM WITHOUT ACUTE COR PULMONALE, UNSPECIFIED CHRONICITY (HCC): ICD-10-CM

## 2020-04-01 DIAGNOSIS — E61.1 IRON DEFICIENCY: ICD-10-CM

## 2020-04-01 LAB
ALBUMIN SERPL-MCNC: 3.7 G/DL (ref 3.5–5.2)
ALBUMIN/GLOB SERPL: 1.1 G/DL
ALP SERPL-CCNC: 96 U/L (ref 39–117)
ALT SERPL W P-5'-P-CCNC: 28 U/L (ref 1–33)
ANION GAP SERPL CALCULATED.3IONS-SCNC: 11.7 MMOL/L (ref 5–15)
AST SERPL-CCNC: 22 U/L (ref 1–32)
BASOPHILS # BLD AUTO: 0.03 10*3/MM3 (ref 0–0.2)
BASOPHILS NFR BLD AUTO: 0.8 % (ref 0–1.5)
BILIRUB SERPL-MCNC: 0.2 MG/DL (ref 0.2–1.2)
BUN BLD-MCNC: 12 MG/DL (ref 6–20)
BUN/CREAT SERPL: 17.6 (ref 7–25)
CALCIUM SPEC-SCNC: 9 MG/DL (ref 8.6–10.5)
CHLORIDE SERPL-SCNC: 102 MMOL/L (ref 98–107)
CO2 SERPL-SCNC: 21.3 MMOL/L (ref 22–29)
CREAT BLD-MCNC: 0.68 MG/DL (ref 0.57–1)
DEPRECATED RDW RBC AUTO: 74.1 FL (ref 37–54)
EOSINOPHIL # BLD AUTO: 0.29 10*3/MM3 (ref 0–0.4)
EOSINOPHIL NFR BLD AUTO: 7.4 % (ref 0.3–6.2)
ERYTHROCYTE [DISTWIDTH] IN BLOOD BY AUTOMATED COUNT: 22.6 % (ref 12.3–15.4)
GFR SERPL CREATININE-BSD FRML MDRD: 96 ML/MIN/1.73
GLOBULIN UR ELPH-MCNC: 3.4 GM/DL
GLUCOSE BLD-MCNC: 141 MG/DL (ref 65–99)
HCT VFR BLD AUTO: 40.7 % (ref 34–46.6)
HGB BLD-MCNC: 13.1 G/DL (ref 12–15.9)
IMM GRANULOCYTES # BLD AUTO: 0.02 10*3/MM3 (ref 0–0.05)
IMM GRANULOCYTES NFR BLD AUTO: 0.5 % (ref 0–0.5)
LYMPHOCYTES # BLD AUTO: 0.77 10*3/MM3 (ref 0.7–3.1)
LYMPHOCYTES NFR BLD AUTO: 19.6 % (ref 19.6–45.3)
MAGNESIUM SERPL-MCNC: 2 MG/DL (ref 1.6–2.6)
MCH RBC QN AUTO: 29.2 PG (ref 26.6–33)
MCHC RBC AUTO-ENTMCNC: 32.2 G/DL (ref 31.5–35.7)
MCV RBC AUTO: 90.8 FL (ref 79–97)
MONOCYTES # BLD AUTO: 0.28 10*3/MM3 (ref 0.1–0.9)
MONOCYTES NFR BLD AUTO: 7.1 % (ref 5–12)
NEUTROPHILS # BLD AUTO: 2.54 10*3/MM3 (ref 1.7–7)
NEUTROPHILS NFR BLD AUTO: 64.6 % (ref 42.7–76)
NRBC BLD AUTO-RTO: 0 /100 WBC (ref 0–0.2)
PLATELET # BLD AUTO: 148 10*3/MM3 (ref 140–450)
PMV BLD AUTO: 8.4 FL (ref 6–12)
POTASSIUM BLD-SCNC: 4.1 MMOL/L (ref 3.5–5.2)
PROT SERPL-MCNC: 7.1 G/DL (ref 6–8.5)
RBC # BLD AUTO: 4.48 10*6/MM3 (ref 3.77–5.28)
SODIUM BLD-SCNC: 135 MMOL/L (ref 136–145)
WBC NRBC COR # BLD: 3.93 10*3/MM3 (ref 3.4–10.8)

## 2020-04-01 PROCEDURE — 80053 COMPREHEN METABOLIC PANEL: CPT | Performed by: INTERNAL MEDICINE

## 2020-04-01 PROCEDURE — 25010000002 FOSAPREPITANT PER 1 MG: Performed by: NURSE PRACTITIONER

## 2020-04-01 PROCEDURE — 25010000002 OXALIPLATIN PER 0.5 MG: Performed by: NURSE PRACTITIONER

## 2020-04-01 PROCEDURE — 96375 TX/PRO/DX INJ NEW DRUG ADDON: CPT

## 2020-04-01 PROCEDURE — 96413 CHEMO IV INFUSION 1 HR: CPT

## 2020-04-01 PROCEDURE — 96415 CHEMO IV INFUSION ADDL HR: CPT

## 2020-04-01 PROCEDURE — 25010000002 FLUOROURACIL PER 500 MG: Performed by: NURSE PRACTITIONER

## 2020-04-01 PROCEDURE — 99214 OFFICE O/P EST MOD 30 MIN: CPT | Performed by: NURSE PRACTITIONER

## 2020-04-01 PROCEDURE — 96368 THER/DIAG CONCURRENT INF: CPT

## 2020-04-01 PROCEDURE — 96416 CHEMO PROLONG INFUSE W/PUMP: CPT

## 2020-04-01 PROCEDURE — 83735 ASSAY OF MAGNESIUM: CPT | Performed by: INTERNAL MEDICINE

## 2020-04-01 PROCEDURE — 96411 CHEMO IV PUSH ADDL DRUG: CPT

## 2020-04-01 PROCEDURE — 25010000002 LEUCOVORIN CALCIUM PER 50 MG: Performed by: NURSE PRACTITIONER

## 2020-04-01 PROCEDURE — 25010000002 PALONOSETRON PER 25 MCG: Performed by: NURSE PRACTITIONER

## 2020-04-01 PROCEDURE — 25010000002 DEXAMETHASONE SODIUM PHOSPHATE 100 MG/10ML SOLUTION: Performed by: NURSE PRACTITIONER

## 2020-04-01 PROCEDURE — 96367 TX/PROPH/DG ADDL SEQ IV INF: CPT

## 2020-04-01 PROCEDURE — 85025 COMPLETE CBC W/AUTO DIFF WBC: CPT

## 2020-04-01 RX ORDER — FAMOTIDINE 10 MG/ML
20 INJECTION, SOLUTION INTRAVENOUS AS NEEDED
Status: CANCELLED | OUTPATIENT
Start: 2020-04-01

## 2020-04-01 RX ORDER — DIPHENHYDRAMINE HYDROCHLORIDE 50 MG/ML
50 INJECTION INTRAMUSCULAR; INTRAVENOUS AS NEEDED
Status: CANCELLED | OUTPATIENT
Start: 2020-04-01

## 2020-04-01 RX ORDER — FLUOROURACIL 50 MG/ML
400 INJECTION, SOLUTION INTRAVENOUS ONCE
Status: COMPLETED | OUTPATIENT
Start: 2020-04-01 | End: 2020-04-01

## 2020-04-01 RX ORDER — PALONOSETRON 0.05 MG/ML
0.25 INJECTION, SOLUTION INTRAVENOUS ONCE
Status: COMPLETED | OUTPATIENT
Start: 2020-04-01 | End: 2020-04-01

## 2020-04-01 RX ORDER — DEXTROSE MONOHYDRATE 50 MG/ML
250 INJECTION, SOLUTION INTRAVENOUS ONCE
Status: COMPLETED | OUTPATIENT
Start: 2020-04-01 | End: 2020-04-01

## 2020-04-01 RX ORDER — DEXTROSE MONOHYDRATE 50 MG/ML
250 INJECTION, SOLUTION INTRAVENOUS ONCE
Status: CANCELLED | OUTPATIENT
Start: 2020-04-01

## 2020-04-01 RX ORDER — FLUOROURACIL 50 MG/ML
400 INJECTION, SOLUTION INTRAVENOUS ONCE
Status: CANCELLED | OUTPATIENT
Start: 2020-04-01

## 2020-04-01 RX ORDER — PALONOSETRON 0.05 MG/ML
0.25 INJECTION, SOLUTION INTRAVENOUS ONCE
Status: CANCELLED | OUTPATIENT
Start: 2020-04-01

## 2020-04-01 RX ADMIN — FLUOROURACIL 4970 MG: 50 INJECTION, SOLUTION INTRAVENOUS at 12:35

## 2020-04-01 RX ADMIN — DEXAMETHASONE SODIUM PHOSPHATE 12 MG: 10 INJECTION, SOLUTION INTRAMUSCULAR; INTRAVENOUS at 09:14

## 2020-04-01 RX ADMIN — DEXTROSE MONOHYDRATE 250 ML: 5 INJECTION, SOLUTION INTRAVENOUS at 09:10

## 2020-04-01 RX ADMIN — LEUCOVORIN CALCIUM 830 MG: 350 INJECTION, POWDER, LYOPHILIZED, FOR SOLUTION INTRAMUSCULAR; INTRAVENOUS at 10:27

## 2020-04-01 RX ADMIN — FLUOROURACIL 830 MG: 50 INJECTION, SOLUTION INTRAVENOUS at 12:35

## 2020-04-01 RX ADMIN — OXALIPLATIN 175 MG: 100 INJECTION, SOLUTION, CONCENTRATE INTRAVENOUS at 10:28

## 2020-04-01 RX ADMIN — PALONOSETRON 0.25 MG: 0.05 INJECTION, SOLUTION INTRAVENOUS at 09:14

## 2020-04-01 RX ADMIN — SODIUM CHLORIDE 100 ML: 9 INJECTION, SOLUTION INTRAVENOUS at 09:33

## 2020-04-01 NOTE — PROGRESS NOTES
REASON FOR FOLLOW UP:     1. Newly diagnosed rectal cancer, rectal ultrasound examination reported T3N0 disease without lymphadenopathy. She does have small pulmonary nodule 6-7 mm and 2 mm with indeterminate feature. There is no solid evidence of metastatic disease otherwise. Patient has stage IIA (T3N0M0) disease.  2. The patient is heterozygous factor V Leiden, currently on prophylactic anticoagulation with Lovenox 40 mg daily given her increased risk of thrombosis with MediPort and GI malignancy.   3.  The 8 mm hypermetabolic right upper lobe pulmonary nodule is suspicious for metastatic as well.    4.  Patient had a PowerPort placement on the left upper chest by Dr. Joseph on 8/9/2019.  5.  Patient was started on concurrent infusional 5-FU chemoradiation therapy on 8/12/2019, with planned complete surgical resection and further adjuvant chemotherapy with intention to cure the disease.   6. Patient underwent surgical resection of the colon on 12/2/2019 with Dr. Ye  7. Diarrhea related to therapy and radiation.   8. Patient here today in anticipation of cycle 1 day 1 of adjuvant FOLFOX 6.  9.  On 2/5/2020 FOLFOX 6 cycle 2 delayed secondary to neutropenia.  Patient was given 3 days of Granix injection.  After cycle #2 we planned 3 days of Granix with each cycle.    10.  Patient also intolerant of oral iron.  Patient received 2 doses of IV injectafer on 02/05/2020 and 02/12/2020.   10. 02/12/2020 Proceed with cycle #2 of FOLFOX 6 with 3 days of Granix.  11.  3/11/2020 cycle 4 postponed secondary to abdominal pain and occasional low-grade fevers.  CT scans ordered.  12.  Cycle 4 resumed after CT scan revealed no evidence of disease.  There was evidence of possible vaginal canal fistula and patient referred to Dr. Ye.    HISTORY OF PRESENT ILLNESS:  The patient is a 39 y.o. female with the above medical history, who returns the office today in anticipation of her fifth cycle of adjuvant therapy with  "FOLFOX 6.  She continues to tolerate chemotherapy reasonably well.  She does have intermittent nausea which is controlled with Zofran as needed.  This is improved with the addition of Emend as a premedication.  She denies vomiting.  She does report having abdominal cramping following treatment with loose output from her ostomy.  She reports this pain is quite uncomfortable when she is having cramping.  She is not currently utilizing any over-the-counter medications.    She denies fevers or chills, signs or symptoms of infection.  She has minimal shortness of breath on exertion though recovers very quickly with rest.  She denies a cough.  She does report cold sensitivity lasting almost the full 2 weeks following oxaliplatin.  She does not have any neuropathy without cold exposure, though does have fairly significant numbness with cold exposure.  This is not interfering with her activities of daily living.  She does continue to utilize Granix at home with 3 injections following each 5-FU unhooked.  She does have some discomfort with urination, though describes this is been ongoing for some time, it is not a pain, rather a \"weird feeling \"she describes    Past Medical History:   Diagnosis Date   • Abnormal Pap smear of cervix 02/02/1998    JULIUS I   • Anemia in pregnancy    • Anxiety    • Cervical lymphadenitis 06/06/2012    SEEN AT [facility] ER   • Chronic diarrhea    • Colon polyps     FOLLOWED BY DR. GERONIMO ESPARZA   • Depression    • Factor V Leiden (CMS/[facility])     DX 8-2-2019   • GERD (gastroesophageal reflux disease)    • Heart murmur     ?   • Hematochezia    • Hemorrhoids    • History of chemotherapy 2019    FOLLOWED BY DR. ALEXANDRU HUNT   • History of gestational diabetes    • History of pre-eclampsia    • History of radiation therapy 2019    FOLLOWED BY DR. JAVON LEWIS   • History of TB skin testing 01/17/2009    TB Skin Test   • HPV in female 1998   • IBS (irritable bowel syndrome)    • Infected insect bite of neck " 2016    SEEN AT [facility]   • Irritable bowel syndrome    • Kidney stones 2007    SEEN AT [facility] ER   • Lumbar disc disease    • On anticoagulant therapy    • PIH (pregnancy induced hypertension)    • Pulmonary embolism (CMS/HCC) 2019    ADMITTED TO [facility]   • Rectal cancer (CMS/HCC) 2019    INVASIVE MODERATELY DIFFERENTIATED ADENOCARCINOMA, CHEMO AND XRT FINISHED 2019   • Right leg pain    • Right ureteral stone 10/01/2019    SEEN AT [facility] ER   • SAB (spontaneous ) 1996     A2-1 INDUCED   • Sepsis due to cellulitis (CMS/HCC) 2002    LEFT GREAT TOE, ADMITTED TO [facility]     Past Surgical History:   Procedure Laterality Date   • CHOLECYSTECTOMY N/A 10/10/2003    LAPAROSCOPIC WITH CHOLANGIOGRAM, DR. JAMEY TALAVERA AT [facility]   • COLON RESECTION N/A 2019    Procedure: laparoscopic low anterior colon resection with mobilization of splenic flexure and diverting loop ileostomy: ERAS;  Surgeon: Padmaja Esparza MD;  Location: [facility] OR;  Service: General   • COLONOSCOPY W/ POLYPECTOMY N/A 2019    15 MM TUBULOVILLOUS ADENOMA POLYP IN HEPATIC FLEXURE, 20 MMTUBULOVILLOUS ADENOMA WITH HIGH GRADE DYSPLASIA IN RECTOSIGMOID, 4 CM MASS IN MID RECTUM, PATH: INVASIVE MODERATELY DIFFERENTIATED ADENOCARCINOMA, DR. JENNIFER LI AT [facility]   • DILATATION AND EVACUATION N/A    • ENDOSCOPY N/A 2019    LA GRADE A ESOPHAGITIS, GASTRITIS, ALL BIOPSIES BENIGN, DR. JENNIFER LI AT [facility]   • PAP SMEAR N/A 2014   • SIGMOIDOSCOPY N/A 2019    INFILTRATIVE PARTIALLY OBSTRUCTING LARGE RECTAL CANCER, AREA TATOOED, DR. PADMAJA ESPARZA AT [facility]   • SIGMOIDOSCOPY N/A 2019    AN ULCERATED PARTIALLY OBSTRUCTING MASS IN MID RECTUM, AREA TATTOOED, DR. PADMAJA ESPARZA AT [facility]   • TRANSRECTAL ULTRASOUND N/A 2019    Procedure: ULTRASOUND TRANSRECTAL;  Surgeon: Padmaja Esparza MD;  Location: [facility] ENDOSCOPY;  Service: General   • URETEROSCOPY LASER  LITHOTRIPSY WITH STENT INSERTION Right 10/2019   • VAGINAL DELIVERY N/A 09/18/1998   • VENOUS ACCESS DEVICE (PORT) INSERTION Left 8/9/2019    Procedure: INSERTION VENOUS ACCESS DEVICE;  Surgeon: Sj Joseph MD;  Location: [facility] [place] [facility];  Service: General   • WISDOM TOOTH EXTRACTION  1993       HEMATOLOGIC/ONCOLOGIC HISTORY:      The patient reports she has intermittent rectal bleeding and urgency, mucous with her stool, starting sometime in 2016. At that time she was referred to GI service, and was diagnosed of irritable bowel syndrome. Nevertheless she had worsening urgency for bowel movement, and had a couple of incidents losing control of stool. She was recently seen by Roland Thorpe MD again and had colonoscopy and EGD exam on 07/08/2019. She was found having a circumferential rectal mass. According to the procedure note, the patient had a fungating circumferential bleeding 4 cm mass in the middle rectum, at distance between 13 cm and 17 cm from the anus. Mass was causing partial obstruction. There were also colon polyps found at the hepatic flexure and also at the rectosigmoid junction 23 cm from the anus. Both were resected and retrieved. EGD examination reported grade A esophagitis at the GE junction and patchy discontinuous erythema and aggravation of the mucosa without bleeding in the stomach body. There is normal mucosa of the duodenum. Pathology evaluation from this procedure reported moderately differentiated adenocarcinoma involving the rectal mass. The rectal sigmoid junction polyp was tubular/tubulovillous adenoma with high grade dysplasia negative for invasive malignancy. The hepatic flexure polyp was also tubular/tubulovillous adenoma negative for high grade dysplasia or malignancy. The biopsy from the esophagus reports squamocolumnar mucosa with inflammatory changes suggestive of mild reflux esophagitis, negative for interstitial metaplasia. Gastric biopsy was benign and duodenal biopsy was  also benign. There is no mention of H-pylori.     Patient was subsequently referred to colorectal surgeon Padmaja Ye MD for further evaluation. The patient had CT scan examination for chest, abdomen and pelvis requested by Dr. Ye and were done on 07/13/2019. The study reported no evidence of lymphadenopathy in the abdomen and pelvis. There was wall thickening of the rectosigmoid junction. Normal spleen, pancreas, adrenal glands and kidneys. There was fatty liver infiltration but no focal lymphatic lesions. There was a small 6-7 mm noncalcified nodule in the right upper lobe and a tiny 3 mm subpleural nodule in the right middle lobe. No mediastinal or hilar lymphadenopathy.     Dr. Ye performed staging rectal ultrasound examination. This study reported tumor penetrating through the muscularis propria as T3 disease; there were no lymph nodes identified.    She had a hospitalization in late September 2019 because of newly discovered pulmonary emboli.  She was on prophylactic Lovenox prior to the incident of PE.  Now she is on full dose Xarelto anticoagulation.  Patient reports no further chest pain dyspnea.  She denies bleeding or bruising.  During her hospitalization, she was seen by Dr. Ye, who plans to have surgery 8 to 12 weeks after finishing radiation therapy.  She finished her radiation on 9/19/2019.     I noticed patient also presented to the emergency room on 10/1/2019 complaining of right flank area pain.  I reviewed the images studies and indeed she has a very small 1 to 2 mm obstructing kidney stone in the UV junction.  Patient is still symptomatic with some pains and dysuria.  She denies fever sweating or chills.    Laboratory study on 10/7/2019 reported normal CBC including hemoglobin 13.1, platelets 301,000, WBC 6170 and ANC 4900 lymphocytes 590 monocytes 480.      The nurse reported malfunction of port-a-catheter on 10/7/2019.  Port study on 10/8/2019 showed fibrin sheath around the distal  aspect of the Mediport catheter in the SVC. This does not appear to hinder injection, but does prevent aspiration at this time.    Patient underwent surgical resection of the colon on 12/2/2019 with Dr. Ye.  Pathology evaluation reported residual T3 disease, also 1 out of 14 lymph nodes positive for malignancy.  So this patient in either had at least stage IIIb disease (T3 N1 M0?).      Adjuvant chemotherapy FOLFOX 6 will be started on 1/22/2020.    Patient was here on 02/12/2020 for cycle 2 of her FOLFOX.  This is delayed x1 week secondary to neutropenia.  The patient ultimately received 3 days worth of Neupogen with recovery of her blood counts.  Of note, the patient struggled with significant nausea without any episodes of vomiting following cycle #1 of chemotherapy resulting in significant weight loss.  She is up 12 pounds over the last week as her appetite has normalized.  We will add Emend to her care plan.    She is having several loose stools per her colostomy and has been hesitant to take Imodium due to prior history of constipation.  I encouraged her to try this with a maximum of 8 tablets/day.  She denies any infectious symptoms including fevers or chills.  No excess bleeding or bruising noted.  She had the expected cold sensitivity related to the Oxaliplatin for about 3 days following treatment.    Labs from 02/12/2020 demonstrated total white blood cell count of 5.14, ANC of 3.06, hemoglobin of 11.2, platelets of 211,000.  She was found to be iron deficient last week and is intolerant to oral iron secondary to GI distress.  For this reason, she initiated IV iron therapy with Injectafer last week.  She had received her second dose 02/12/2020    MEDICATIONS    Current Outpatient Medications:   •  clonazePAM (KlonoPIN) 1 MG tablet, Take 1 tablet by mouth 3 (Three) Times a Day As Needed for Anxiety or Seizures., Disp: 90 tablet, Rfl: 0  •  escitalopram (LEXAPRO) 20 MG tablet, Take 1 tablet by mouth  Daily., Disp: 90 tablet, Rfl: 3  •  famotidine (PEPCID) 20 MG tablet, TAKE 1 TABLET BY MOUTH TWICE A DAY, Disp: 60 tablet, Rfl: 1  •  Filgrastim-aafi (NIVESTYM) 480 MCG/0.8ML solution prefilled syringe, Inject 480 mcg as directed Daily. For 3 days after each chemotherapy cycle. Cycles are every 2 weeks., Disp: 4.8 mL, Rfl: 6  •  HYDROcodone-acetaminophen (NORCO) 7.5-325 MG per tablet, Take 1 tablet by mouth Every 4 (Four) Hours As Needed for Moderate Pain ., Disp: 180 tablet, Rfl: 0  •  Loratadine (CLARITIN) 10 MG capsule, Take  by mouth., Disp: , Rfl:   •  metoprolol tartrate (LOPRESSOR) 25 MG tablet, TAKE 1/2 TABLET BY MOUTH EVERY 12 (TWELVE) HOURS., Disp: 60 tablet, Rfl: 1  •  ondansetron (ZOFRAN) 8 MG tablet, Take 1 tablet by mouth 3 (Three) Times a Day As Needed for Nausea or Vomiting., Disp: 60 tablet, Rfl: 5  •  phenazopyridine (PYRIDIUM) 100 MG tablet, Take 1 tablet by mouth 3 (Three) Times a Day As Needed for Bladder Spasms., Disp: 120 tablet, Rfl: 1  •  prochlorperazine (COMPAZINE) 10 MG tablet, Take 1 tablet by mouth Every 6 (Six) Hours As Needed for Nausea or Vomiting., Disp: 30 tablet, Rfl: 2  •  rivaroxaban (XARELTO) 20 MG tablet, Take 1 tablet by mouth Daily. Start after finishing starter pack. (Patient taking differently: Take 20 mg by mouth Daily. PT HOLDING 48 HOURS PRIOR TO SURGERY), Disp: 30 tablet, Rfl: 11  No current facility-administered medications for this visit.     Facility-Administered Medications Ordered in Other Visits:   •  dextrose (D5W) 5 % infusion 250 mL, 250 mL, Intravenous, Once, Patience Lorenzo APRN  •  fluorouracil (ADRUCIL) 4,970 mg, sodium chloride 0.9 % chemo infusion - FOR HOME USE, 2,400 mg/m2 (Treatment Plan Recorded), Intravenous, Once, Patience Lorenzo APRN  •  fluorouracil (ADRUCIL) chemo injection 830 mg, 400 mg/m2 (Treatment Plan Recorded), Intravenous, Once, Patience Lorenzo APRN  •  FOSAPREPITANT 150 MG/100ML NORMAL SALINE (CBC) IVPB 100 mL  100 mL, 150 mg, Intravenous, Once, Patience Lorenzo APRN, Last Rate: 200 mL/hr at 20, 100 mL at 20  •  leucovorin 830 mg in dextrose (D5W) 5 % 333 mL IVPB, 400 mg/m2 (Treatment Plan Recorded), Intravenous, Once, KwabenaweinPatience APRN  •  OXALIplatin (ELOXATIN) 175 mg in dextrose (D5W) 5 % 285 mL chemo IVPB, 85 mg/m2 (Treatment Plan Recorded), Intravenous, Once, [place], Patience Miller APRN    ALLERGIES:   No Known Allergies    SOCIAL HISTORY:       Social History     Tobacco Use   • Smoking status: Former Smoker     Packs/day: 1.00     Years: 24.00     Pack years: 24.00     Types: Electronic Cigarette, Cigarettes     Last attempt to quit: 2019     Years since quittin.5   • Smokeless tobacco: Never Used   Substance Use Topics   • Alcohol use: Yes     Comment: Rarely   • Drug use: No         FAMILY HISTORY:   Mother has positive factor V Leiden mutation, although she did not have thrombosis, mother also is coronary disease with stenting, she also is occasional bruising.  Maternal grandmother had DVT, she had multiple surgical procedures.  Patient mother's half-brother had metastatic colon cancer at diagnosis in his 50s.  Maternal great aunt has breast cancer.  Patient will follow his skin cancer in his 60s, exclusive.    I have reviewed the patient's medical history in detail and updated the computerized patient record.    REVIEW OF SYSTEMS:  Review of Systems   Constitutional: Positive for fatigue. Negative for appetite change, chills, diaphoresis and fever.   HENT: Negative for congestion, hearing loss, mouth sores, sore throat and trouble swallowing.    Eyes: Negative for visual disturbance.   Respiratory: Negative for cough, chest tightness, shortness of breath and wheezing.    Cardiovascular: Negative for chest pain, palpitations and leg swelling.   Gastrointestinal: Positive for abdominal pain (cramping), diarrhea (loose output.) and nausea (controlled with  "zofran). Negative for abdominal distention, anal bleeding, constipation, rectal pain and vomiting.   Endocrine: Positive for cold intolerance.   Genitourinary: Negative for frequency, hematuria and urgency.   Musculoskeletal: Negative for arthralgias, back pain and joint swelling.   Skin: Negative for rash and wound.   Allergic/Immunologic: Negative for immunocompromised state.   Neurological: Positive for numbness (cold exposure. ). Negative for dizziness, seizures, syncope, speech difficulty and headaches.   Hematological: Negative for adenopathy. Does not bruise/bleed easily.   Psychiatric/Behavioral: Negative for agitation, behavioral problems, confusion and suicidal ideas.   Review of systems 04/01/2020  unchanged from previous office visit except as updated.         Vitals:    04/01/20 0817   BP: 130/90   Pulse: 96   Resp: 16   Temp: 98.2 °F (36.8 °C)   TempSrc: Oral   SpO2: 97%   Weight: 99.7 kg (219 lb 11.2 oz)   Height: 166 cm (65.35\")   PainSc: 0-No pain     Current Status 4/1/2020   ECOG score 0     PHYSICAL EXAM:    GENERAL:  Well-developed, well-nourished  female in no acute distress.  SKIN:  Warm, dry without rashes, purpura or petechiae.  EYES:  Pupils equal, round and reactive to light.  EOMs intact.  Conjunctivae normal.  EARS:  Hearing intact.  CHEST:  Breathing unlabored, no acute distress. Mediport in the left chest wall.   CARDIAC:  Regular rate and rhythm without murmurs. Normal S1,S2. Right upper chest port.   ABDOMEN: Mild tenderness throughout. ostomy in place with moderate brown/yellow fecal matter.   EXTREMITIES:  No clubbing, cyanosis or edema.  NEUROLOGICAL:  Cranial Nerves II-XII grossly intact.  No focal neurological deficits.  PSYCHIATRIC:  Normal affect and mood.    Physical exam 04/01/2020  unchanged from previous office visit except as updated.      RECENT LABS:  Results from last 7 days   Lab Units 04/01/20  0759   WBC 10*3/mm3 3.93   NEUTROS ABS 10*3/mm3 2.54 "   HEMOGLOBIN g/dL 13.1   HEMATOCRIT % 40.7   PLATELETS 10*3/mm3 148     Results from last 7 days   Lab Units 04/01/20  0759   SODIUM mmol/L 135*   POTASSIUM mmol/L 4.1   CHLORIDE mmol/L 102   CO2 mmol/L 21.3*   BUN mg/dL 12   CREATININE mg/dL 0.68   CALCIUM mg/dL 9.0   ALBUMIN g/dL 3.70   BILIRUBIN mg/dL 0.2   ALK PHOS U/L 96   ALT (SGPT) U/L 28   AST (SGOT) U/L 22   GLUCOSE mg/dL 141*   MAGNESIUM mg/dL 2.0         Assessment/Plan    1.  Newly diagnosed rectal cancer, rectal ultrasound examination reported T3N0 disease without lymphadenopathy. CT scan of chest, abdomen and pelvis reported no lymphadenopathy in the abdomen, pelvis or chest. She does have small pulmonary nodule 6-7 mm and 2 mm with indeterminate feature. There is no solid evidence of metastatic disease otherwise.   · Based on the CT scan and rectal ultrasound imaging studies, this patient had stage IIA (T3N0M0) disease.    · She had PET scan examination on 8/8/2019 which reported a hypermetabolic right upper lobe pulmonary nodule 6 mm with SUV 5.6.  This is suspicious for metastatic disease.  This patient may have stage IV disease.   · She initiated concurrent radiation with continuous 5-FU on 8/12/2019.  · Patient finished concurrent chemoradiation therapy.  · Patient underwent surgical resection of the colon and diverting loop ileostomy on 12/2/2019 with Dr. Ye.  Pathology evaluation reported residual T3 disease, with 1 out of 14 lymph nodes positive for malignancy.  Certainly this patient has at least stage IIIb rectal cancer (T3 N1 M0?)  · On 1/22/2020 adjuvant chemotherapy FOLFOX 6 regimen initiated.    · On 2/5/2022 cycle #2 was delayed secondary to neutropenia.  She was given 3 days of Granix.   · Emend added with cycle 3.  With improved nausea control  · Continuing home Granix x3 days following 5-FU disconnect  · 3/11/2020 due for cycle 4, however, she is experiencing progressive abdominal pain and occasional fevers.   · CT scan performed  "on March 13 reveals no evidence of progressive or recurrent disease.  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  · Proceed with cycle 5 today, 4/1/2020    2 .  Pulmonary emboli with background of positive factor V Leiden mutation   · The patient has heterozygous factor V Leiden mutation and therefore was on low-dose anticoagulation with Lovenox, 40 mg daily given her increased risk of thrombosis with MediPort and GI malignancy.    · Nevertheless this patient was found to having pulmonary emboli on 9/20/2019 despite low-dose Lovenox.  She had a brief hospitalization in late September 2019, she was started on full dose Xarelto anticoagulation per protocol.    · The patient continues on Xarelto and is tolerating this well without bleeding issues.    3.  Loose, frequent stools:   · Patient is s/p surgical resection of the rectum tumor and diverting loop ileostomy on 12/2/2019 with Dr. Ye.  · She reports having liquid stools in her colostomy bag.  She reports her liquid stools typically appear \"clear mustard yellow\" or dark.  · She does have significant abdominal cramping.  We discussed utilizing Bentyl.  She thinks she has a prescription at home which she will utilize.  If not she will call the office.    4. Pelvic pain: Related to previous radiation therapy and the surgery for resection of the primary rectal cancer.  Stable see above.    5. Hypokalemia: She is currently taking 20 mg of potassium every other day.    · Her potassium today is 4.1.    6.  This patient has also strong family history for malignancy the patient had appointment with genetic counseling on September 3, 2019. NF1 [id number] 3C >T.    7.  Nausea: This does seem to be multifactorial with a large part due to anxiety.  The patient does continue on Lexapro at 20 mg daily as well as Klonopin 0.5 mg once daily as needed.      · Emend was added with cycle 3 with much better management of nausea  · She can continue to utilize Zofran and Compazine as " needed    8.  Mild anemia, improved since her surgery.  She also has a history of iron deficiency.  Iron studies reveal a saturation of just 10%.  She was started on oral iron but was unable to tolerate due to GI side effects.   · Status post Injectafer 2/5/2020 and 2/12/2020   · Hemoglobin within normal limits today at 13.1    9.  Neutropenia secondary to chemotherapy.  · As noted above, the patient will receive 3 days of Granix begin on day 4 of each cycle of chemotherapy.  We obtained approval for self administration at home.  This started following cycle 3 chemotherapy  · Total white count today 3.93, ANC 2.54    10.  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    · Dr. Nguyen and Dr. Ye have had the opportunity to review the patient's CT scan and feel comfortable with her proceeding with treatment.  She is scheduled to follow-up with Dr. Ye in May    11.  Cold sensitivity secondary to oxaliplatin  · Lasting 10 days to 2 weeks with numbness almost constantly.  · Begin B complex    PLAN:  1. Proceed with cycle 5 FOLFOX 6.  2. Continue home Granix injections x3 days starting day 4  3. Continue compazine and zofran as needed for nausea.   4. Begin Bentyl 20 mg 4 times daily as needed for abdominal cramping  5. Begin B complex vitamin  6. Return in 2 weeks for CBC, CMP, MD follow-up with Dr. Nguyen and cycle 6 adjuvant FOLFOX 6      Patience Lorenzo, APRN  04/01/2020

## 2020-04-03 ENCOUNTER — INFUSION (OUTPATIENT)
Dept: ONCOLOGY | Facility: HOSPITAL | Age: 40
End: 2020-04-03

## 2020-04-03 DIAGNOSIS — Z45.2 ENCOUNTER FOR FITTING AND ADJUSTMENT OF VASCULAR CATHETER: ICD-10-CM

## 2020-04-03 DIAGNOSIS — C20 ADENOCARCINOMA OF RECTUM (HCC): Primary | ICD-10-CM

## 2020-04-03 PROCEDURE — 25010000003 HEPARIN LOCK FLUCH PER 10 UNITS: Performed by: INTERNAL MEDICINE

## 2020-04-03 RX ORDER — SODIUM CHLORIDE 0.9 % (FLUSH) 0.9 %
10 SYRINGE (ML) INJECTION AS NEEDED
Status: CANCELLED | OUTPATIENT
Start: 2020-04-03

## 2020-04-03 RX ORDER — HEPARIN SODIUM (PORCINE) LOCK FLUSH IV SOLN 100 UNIT/ML 100 UNIT/ML
500 SOLUTION INTRAVENOUS AS NEEDED
Status: DISCONTINUED | OUTPATIENT
Start: 2020-04-03 | End: 2020-04-03 | Stop reason: HOSPADM

## 2020-04-03 RX ORDER — SODIUM CHLORIDE 0.9 % (FLUSH) 0.9 %
10 SYRINGE (ML) INJECTION AS NEEDED
Status: DISCONTINUED | OUTPATIENT
Start: 2020-04-03 | End: 2020-04-03 | Stop reason: HOSPADM

## 2020-04-03 RX ORDER — HEPARIN SODIUM (PORCINE) LOCK FLUSH IV SOLN 100 UNIT/ML 100 UNIT/ML
500 SOLUTION INTRAVENOUS AS NEEDED
Status: CANCELLED | OUTPATIENT
Start: 2020-04-03

## 2020-04-03 RX ADMIN — SODIUM CHLORIDE, PRESERVATIVE FREE 10 ML: 5 INJECTION INTRAVENOUS at 10:43

## 2020-04-03 RX ADMIN — Medication 500 UNITS: at 10:42

## 2020-04-14 RX ORDER — FERROUS SULFATE 325(65) MG
325 TABLET ORAL
Qty: 270 TABLET | Refills: 0 | OUTPATIENT
Start: 2020-04-14

## 2020-04-15 ENCOUNTER — INFUSION (OUTPATIENT)
Dept: ONCOLOGY | Facility: HOSPITAL | Age: 40
End: 2020-04-15

## 2020-04-15 ENCOUNTER — OFFICE VISIT (OUTPATIENT)
Dept: ONCOLOGY | Facility: CLINIC | Age: 40
End: 2020-04-15

## 2020-04-15 VITALS
HEART RATE: 104 BPM | OXYGEN SATURATION: 96 % | HEIGHT: 65 IN | SYSTOLIC BLOOD PRESSURE: 146 MMHG | DIASTOLIC BLOOD PRESSURE: 91 MMHG | RESPIRATION RATE: 16 BRPM | BODY MASS INDEX: 37.02 KG/M2 | TEMPERATURE: 97.9 F | WEIGHT: 222.2 LBS

## 2020-04-15 DIAGNOSIS — Z79.01 ANTICOAGULATION ADEQUATE: ICD-10-CM

## 2020-04-15 DIAGNOSIS — D68.51 HETEROZYGOUS FACTOR V LEIDEN MUTATION (HCC): ICD-10-CM

## 2020-04-15 DIAGNOSIS — E87.6 HYPOKALEMIA: ICD-10-CM

## 2020-04-15 DIAGNOSIS — C20 ADENOCARCINOMA OF RECTUM (HCC): Primary | ICD-10-CM

## 2020-04-15 DIAGNOSIS — I26.99 OTHER PULMONARY EMBOLISM WITHOUT ACUTE COR PULMONALE, UNSPECIFIED CHRONICITY (HCC): ICD-10-CM

## 2020-04-15 DIAGNOSIS — C20 ADENOCARCINOMA OF RECTUM (HCC): ICD-10-CM

## 2020-04-15 DIAGNOSIS — E83.42 HYPOMAGNESEMIA: ICD-10-CM

## 2020-04-15 PROBLEM — G62.0 DRUG-INDUCED PERIPHERAL NEUROPATHY (HCC): Status: ACTIVE | Noted: 2020-04-15

## 2020-04-15 LAB
ALBUMIN SERPL-MCNC: 3.5 G/DL (ref 3.5–5.2)
ALBUMIN/GLOB SERPL: 1 G/DL (ref 1.1–2.4)
ALP SERPL-CCNC: 94 U/L (ref 38–116)
ALT SERPL W P-5'-P-CCNC: 26 U/L (ref 0–33)
ANION GAP SERPL CALCULATED.3IONS-SCNC: 15 MMOL/L (ref 5–15)
AST SERPL-CCNC: 22 U/L (ref 0–32)
BASOPHILS # BLD AUTO: 0.01 10*3/MM3 (ref 0–0.2)
BASOPHILS NFR BLD AUTO: 0.2 % (ref 0–1.5)
BILIRUB SERPL-MCNC: 0.3 MG/DL (ref 0.2–1.2)
BUN BLD-MCNC: 9 MG/DL (ref 6–20)
BUN/CREAT SERPL: 13.4 (ref 7.3–30)
CALCIUM SPEC-SCNC: 9.4 MG/DL (ref 8.5–10.2)
CHLORIDE SERPL-SCNC: 99 MMOL/L (ref 98–107)
CO2 SERPL-SCNC: 24 MMOL/L (ref 22–29)
CREAT BLD-MCNC: 0.67 MG/DL (ref 0.6–1.1)
DEPRECATED RDW RBC AUTO: 76.8 FL (ref 37–54)
EOSINOPHIL # BLD AUTO: 0.09 10*3/MM3 (ref 0–0.4)
EOSINOPHIL NFR BLD AUTO: 2.1 % (ref 0.3–6.2)
ERYTHROCYTE [DISTWIDTH] IN BLOOD BY AUTOMATED COUNT: 23.2 % (ref 12.3–15.4)
GFR SERPL CREATININE-BSD FRML MDRD: 97 ML/MIN/1.73
GLOBULIN UR ELPH-MCNC: 3.5 GM/DL (ref 1.8–3.5)
GLUCOSE BLD-MCNC: 123 MG/DL (ref 74–124)
HCT VFR BLD AUTO: 41.8 % (ref 34–46.6)
HGB BLD-MCNC: 13.7 G/DL (ref 12–15.9)
IMM GRANULOCYTES # BLD AUTO: 0.02 10*3/MM3 (ref 0–0.05)
IMM GRANULOCYTES NFR BLD AUTO: 0.5 % (ref 0–0.5)
LYMPHOCYTES # BLD AUTO: 0.94 10*3/MM3 (ref 0.7–3.1)
LYMPHOCYTES NFR BLD AUTO: 21.5 % (ref 19.6–45.3)
MAGNESIUM SERPL-MCNC: 1.7 MG/DL (ref 1.8–2.5)
MCH RBC QN AUTO: 29.9 PG (ref 26.6–33)
MCHC RBC AUTO-ENTMCNC: 32.8 G/DL (ref 31.5–35.7)
MCV RBC AUTO: 91.3 FL (ref 79–97)
MONOCYTES # BLD AUTO: 0.41 10*3/MM3 (ref 0.1–0.9)
MONOCYTES NFR BLD AUTO: 9.4 % (ref 5–12)
NEUTROPHILS # BLD AUTO: 2.9 10*3/MM3 (ref 1.7–7)
NEUTROPHILS NFR BLD AUTO: 66.3 % (ref 42.7–76)
NRBC BLD AUTO-RTO: 0 /100 WBC (ref 0–0.2)
PLATELET # BLD AUTO: 169 10*3/MM3 (ref 140–450)
PMV BLD AUTO: 9 FL (ref 6–12)
POTASSIUM BLD-SCNC: 3.7 MMOL/L (ref 3.5–4.7)
PROT SERPL-MCNC: 7 G/DL (ref 6.3–8)
RBC # BLD AUTO: 4.58 10*6/MM3 (ref 3.77–5.28)
SODIUM BLD-SCNC: 138 MMOL/L (ref 134–145)
WBC NRBC COR # BLD: 4.37 10*3/MM3 (ref 3.4–10.8)

## 2020-04-15 PROCEDURE — 80053 COMPREHEN METABOLIC PANEL: CPT

## 2020-04-15 PROCEDURE — 25010000002 DEXAMETHASONE SODIUM PHOSPHATE 100 MG/10ML SOLUTION: Performed by: INTERNAL MEDICINE

## 2020-04-15 PROCEDURE — 96416 CHEMO PROLONG INFUSE W/PUMP: CPT

## 2020-04-15 PROCEDURE — 96411 CHEMO IV PUSH ADDL DRUG: CPT

## 2020-04-15 PROCEDURE — 85025 COMPLETE CBC W/AUTO DIFF WBC: CPT

## 2020-04-15 PROCEDURE — 25010000002 FLUOROURACIL PER 500 MG: Performed by: INTERNAL MEDICINE

## 2020-04-15 PROCEDURE — 25010000002 OXALIPLATIN PER 0.5 MG: Performed by: INTERNAL MEDICINE

## 2020-04-15 PROCEDURE — 96415 CHEMO IV INFUSION ADDL HR: CPT

## 2020-04-15 PROCEDURE — 96413 CHEMO IV INFUSION 1 HR: CPT

## 2020-04-15 PROCEDURE — 25010000002 LEUCOVORIN CALCIUM PER 50 MG: Performed by: INTERNAL MEDICINE

## 2020-04-15 PROCEDURE — 99214 OFFICE O/P EST MOD 30 MIN: CPT | Performed by: INTERNAL MEDICINE

## 2020-04-15 PROCEDURE — 36415 COLL VENOUS BLD VENIPUNCTURE: CPT | Performed by: INTERNAL MEDICINE

## 2020-04-15 PROCEDURE — 96375 TX/PRO/DX INJ NEW DRUG ADDON: CPT

## 2020-04-15 PROCEDURE — 83735 ASSAY OF MAGNESIUM: CPT | Performed by: INTERNAL MEDICINE

## 2020-04-15 PROCEDURE — 25010000002 FOSAPREPITANT PER 1 MG: Performed by: INTERNAL MEDICINE

## 2020-04-15 PROCEDURE — 25010000002 PROMETHAZINE PER 50 MG: Performed by: NURSE PRACTITIONER

## 2020-04-15 PROCEDURE — 96367 TX/PROPH/DG ADDL SEQ IV INF: CPT

## 2020-04-15 PROCEDURE — 25010000002 PALONOSETRON PER 25 MCG: Performed by: INTERNAL MEDICINE

## 2020-04-15 PROCEDURE — 96368 THER/DIAG CONCURRENT INF: CPT

## 2020-04-15 RX ORDER — PALONOSETRON 0.05 MG/ML
0.25 INJECTION, SOLUTION INTRAVENOUS ONCE
Status: COMPLETED | OUTPATIENT
Start: 2020-04-15 | End: 2020-04-15

## 2020-04-15 RX ORDER — PALONOSETRON 0.05 MG/ML
0.25 INJECTION, SOLUTION INTRAVENOUS ONCE
Status: CANCELLED | OUTPATIENT
Start: 2020-04-15

## 2020-04-15 RX ORDER — DEXTROSE MONOHYDRATE 50 MG/ML
250 INJECTION, SOLUTION INTRAVENOUS ONCE
Status: COMPLETED | OUTPATIENT
Start: 2020-04-15 | End: 2020-04-15

## 2020-04-15 RX ORDER — SODIUM CHLORIDE 9 MG/ML
250 INJECTION, SOLUTION INTRAVENOUS ONCE
Status: COMPLETED | OUTPATIENT
Start: 2020-04-15 | End: 2020-04-15

## 2020-04-15 RX ORDER — FAMOTIDINE 10 MG/ML
20 INJECTION, SOLUTION INTRAVENOUS AS NEEDED
Status: CANCELLED | OUTPATIENT
Start: 2020-04-15

## 2020-04-15 RX ORDER — FLUOROURACIL 50 MG/ML
400 INJECTION, SOLUTION INTRAVENOUS ONCE
Status: CANCELLED | OUTPATIENT
Start: 2020-04-15

## 2020-04-15 RX ORDER — DIPHENHYDRAMINE HYDROCHLORIDE 50 MG/ML
50 INJECTION INTRAMUSCULAR; INTRAVENOUS AS NEEDED
Status: CANCELLED | OUTPATIENT
Start: 2020-04-15

## 2020-04-15 RX ORDER — DEXTROSE MONOHYDRATE 50 MG/ML
250 INJECTION, SOLUTION INTRAVENOUS ONCE
Status: CANCELLED | OUTPATIENT
Start: 2020-04-15

## 2020-04-15 RX ORDER — FLUOROURACIL 50 MG/ML
400 INJECTION, SOLUTION INTRAVENOUS ONCE
Status: COMPLETED | OUTPATIENT
Start: 2020-04-15 | End: 2020-04-15

## 2020-04-15 RX ADMIN — SODIUM CHLORIDE 100 ML: 9 INJECTION, SOLUTION INTRAVENOUS at 09:42

## 2020-04-15 RX ADMIN — SODIUM CHLORIDE 250 ML: 900 INJECTION, SOLUTION INTRAVENOUS at 13:10

## 2020-04-15 RX ADMIN — OXALIPLATIN 175 MG: 5 INJECTION, SOLUTION, CONCENTRATE INTRAVENOUS at 10:44

## 2020-04-15 RX ADMIN — DEXTROSE MONOHYDRATE 250 ML: 50 INJECTION, SOLUTION INTRAVENOUS at 09:42

## 2020-04-15 RX ADMIN — LEUCOVORIN CALCIUM 830 MG: 350 INJECTION, POWDER, LYOPHILIZED, FOR SOLUTION INTRAMUSCULAR; INTRAVENOUS at 10:43

## 2020-04-15 RX ADMIN — FLUOROURACIL 4970 MG: 50 INJECTION, SOLUTION INTRAVENOUS at 13:49

## 2020-04-15 RX ADMIN — FLUOROURACIL 830 MG: 50 INJECTION, SOLUTION INTRAVENOUS at 13:48

## 2020-04-15 RX ADMIN — PALONOSETRON 0.25 MG: 0.05 INJECTION, SOLUTION INTRAVENOUS at 09:42

## 2020-04-15 RX ADMIN — DEXAMETHASONE SODIUM PHOSPHATE 12 MG: 10 INJECTION, SOLUTION INTRAMUSCULAR; INTRAVENOUS at 10:16

## 2020-04-15 RX ADMIN — PROMETHAZINE HYDROCHLORIDE 25 MG: 25 INJECTION INTRAMUSCULAR; INTRAVENOUS at 11:58

## 2020-04-15 NOTE — PROGRESS NOTES
Patient was seen by Dr. Nguyen this morning. She reports feeling gassy with some nausea, but states that Dr. Nguyen is aware and that she feels ok to start chemo today. When connecting IVF, patient had large amount of emesis. Reports feeling much better after vomiting. Seen at chairside by JULIET Talamantes. OK given to give chemo as ordered. Approx. 1 hour after chemo started, patient very nauseated again without emesis. Discussed with JULIET Talamantes. Phenergan 25 mg IV given with good relief of nausea reported. Additional 250 cc NS given when chemo completed. Patient denied nausea at discharge. Verbalized understanding to call if N/V continues and feels need for IVF.  Port did not have blood return at start of chemo infusion. Has had previous dye studies. When port flushed for Phenergan administration, excellent blood return obtained.

## 2020-04-15 NOTE — PROGRESS NOTES
REASON FOR FOLLOW UP:     1. Newly diagnosed rectal cancer, rectal ultrasound examination reported T3N0 disease without lymphadenopathy. She does have small pulmonary nodule 6-7 mm and 2 mm with indeterminate feature. There is no solid evidence of metastatic disease otherwise. Patient has stage IIA (T3N0M0) disease.  2. The patient is heterozygous factor V Leiden, currently on prophylactic anticoagulation with Lovenox 40 mg daily given her increased risk of thrombosis with MediPort and GI malignancy.   3.  The 8 mm hypermetabolic right upper lobe pulmonary nodule is suspicious for metastatic as well.    4.  Patient had a PowerPort placement on the left upper chest by Dr. Joseph on 8/9/2019.  5.  Patient was started on concurrent infusional 5-FU chemoradiation therapy on 8/12/2019, with planned complete surgical resection and further adjuvant chemotherapy with intention to cure the disease.   6. Patient underwent surgical resection of the primary rectal cancer by Dr. Ye on 12/2/2019.  Pathology evaluation reported residual T3N1 disease stage IIIb.  7. Diarrhea related to therapy and radiation.   8. Patient was started cycle 1 day 1 of adjuvant FOLFOX 6 on 1/23/2020.  9.  On 2/5/2020 FOLFOX 6 cycle 2 delayed secondary to neutropenia.  Patient was given 3 days of Granix injection.  After cycle #2 we planned 3 days of Granix with each cycle.    10.  Patient also intolerant of oral iron.  Patient received 2 doses of IV injectafer on 02/05/2020 and 02/12/2020.   10. 02/12/2020 Proceed with cycle #2 of FOLFOX 6 with 3 days of Granix.  11.  3/11/2020 cycle 4 postponed secondary to abdominal pain and occasional low-grade fevers.  CT scans ordered.  12.  Cycle #4 resumed after CT scan revealed no evidence of disease.  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 Ye. patient has no fever.  Continue to observe.     HISTORY OF PRESENT ILLNESS:  The patient  "is a 39 y.o. female with the above medical history, who returns the office today in anticipation of her cycle #5 of adjuvant therapy with FOLFOX 6.      She states that she is having abdominal cramping since about 2 am this morning. She states that she is having some output at the ostomy bag which is abnormal. Therefore she made an appt with  for 05/15/2020. She is still changing her ostomy bag every 3 days. She is not currently utilizing any over-the-counter medications.    She denies fevers or chills, signs or symptoms of infection.  She has minimal shortness of breath on exertion though recovers very quickly with rest.  She denies cough.  She does report cold sensitivity lasting almost for 10 days following oxaliplatin.  She states that she does have neuropathy with cold exposure but it is manageable. This is not interfering with her activities of daily living.  She does continue to utilize Granix at home with 3 injections following each cycle chemotherapy.  She does have some discomfort with urination, though describes this is been ongoing for some time, it is not a pain, rather a \"weird feeling \"she describes    Otherwise, she continues to tolerate chemotherapy reasonably well.  She does have intermittent nausea which is controlled with Zofran as needed.  This is improved with the addition of Emend as a premedication.  She denies vomiting.     Laboratory studies today, 4/15/2020, show low normal WBC 4370 including ANC 2900 and the lymphocytes 940.  Has normal platelets 169,000 and a hemoglobin 13.7.  Her CMP is unremarkable.      Past Medical History:   Diagnosis Date   • Abnormal Pap smear of cervix 02/02/1998    JULIUS I   • Anemia in pregnancy    • Anxiety    • Cervical lymphadenitis 06/06/2012    SEEN AT [facility] ER   • Chronic diarrhea    • Colon polyps     FOLLOWED BY DR. GERONIMO ESPARZA   • Depression    • Factor V Leiden (CMS/[facility])     DX 8-2-2019   • GERD (gastroesophageal reflux disease)    • Heart murmur "     ?   • Hematochezia    • Hemorrhoids    • History of chemotherapy     FOLLOWED BY DR. ALEXANDRU HUNT   • History of gestational diabetes    • History of pre-eclampsia    • History of radiation therapy     FOLLOWED BY DR. JAVON LEWIS   • History of TB skin testing 2009    TB Skin Test   • HPV in female    • IBS (irritable bowel syndrome)    • Infected insect bite of neck 2016    SEEN AT [facility]   • Irritable bowel syndrome    • Kidney stones 2007    SEEN AT [facility] ER   • Lumbar disc disease    • On anticoagulant therapy    • PIH (pregnancy induced hypertension)    • Pulmonary embolism (CMS/HCC) 2019    ADMITTED TO [facility]   • Rectal cancer (CMS/HCC) 2019    INVASIVE MODERATELY DIFFERENTIATED ADENOCARCINOMA, CHEMO AND XRT FINISHED 2019   • Right leg pain    • Right ureteral stone 10/01/2019    SEEN AT [facility] ER   • SAB (spontaneous ) 1996     A2-1 INDUCED   • Sepsis due to cellulitis (CMS/HCC) 2002    LEFT GREAT TOE, ADMITTED TO [facility]     Past Surgical History:   Procedure Laterality Date   • CHOLECYSTECTOMY N/A 10/10/2003    LAPAROSCOPIC WITH CHOLANGIOGRAM, DR. JAMEY TALAVERA AT [facility]   • COLON RESECTION N/A 2019    Procedure: laparoscopic low anterior colon resection with mobilization of splenic flexure and diverting loop ileostomy: ERAS;  Surgeon: Padmaja Ye MD;  Location: [facility];  Service: General   • COLONOSCOPY W/ POLYPECTOMY N/A 2019    15 MM TUBULOVILLOUS ADENOMA POLYP IN HEPATIC FLEXURE, 20 MMTUBULOVILLOUS ADENOMA WITH HIGH GRADE DYSPLASIA IN RECTOSIGMOID, 4 CM MASS IN MID RECTUM, PATH: INVASIVE MODERATELY DIFFERENTIATED ADENOCARCINOMA, DR. JENNIFER LI AT [facility]   • DILATATION AND EVACUATION N/A    • ENDOSCOPY N/A 2019    LA GRADE A ESOPHAGITIS, GASTRITIS, ALL BIOPSIES BENIGN, DR. JENNIFER LI AT [facility]   • PAP SMEAR N/A 2014   • SIGMOIDOSCOPY N/A 2019    INFILTRATIVE  PARTIALLY OBSTRUCTING LARGE RECTAL CANCER, AREA TATOOED, DR. PADMAJA ESPARZA AT [facility]   • SIGMOIDOSCOPY N/A 11/23/2019    AN ULCERATED PARTIALLY OBSTRUCTING MASS IN MID RECTUM, AREA TATTOOED, DR. PADMAJA ESPARZA AT [facility]   • TRANSRECTAL ULTRASOUND N/A 7/24/2019    Procedure: ULTRASOUND TRANSRECTAL;  Surgeon: Padmaja Esparza MD;  Location: [facility] ENDOSCOPY;  Service: General   • URETEROSCOPY LASER LITHOTRIPSY WITH STENT INSERTION Right 10/2019   • VAGINAL DELIVERY N/A 09/18/1998   • VENOUS ACCESS DEVICE (PORT) INSERTION Left 8/9/2019    Procedure: INSERTION VENOUS ACCESS DEVICE;  Surgeon: Sj Joseph MD;  Location: [facility] OR [facility];  Service: General   • WISDOM TOOTH EXTRACTION  1993       HEMATOLOGIC/ONCOLOGIC HISTORY:      The patient reports she has intermittent rectal bleeding and urgency, mucous with her stool, starting sometime in 2016. At that time she was referred to GI service, and was diagnosed of irritable bowel syndrome. Nevertheless she had worsening urgency for bowel movement, and had a couple of incidents losing control of stool. She was recently seen by Roland Thorpe MD again and had colonoscopy and EGD exam on 07/08/2019. She was found having a circumferential rectal mass. According to the procedure note, the patient had a fungating circumferential bleeding 4 cm mass in the middle rectum, at distance between 13 cm and 17 cm from the anus. Mass was causing partial obstruction. There were also colon polyps found at the hepatic flexure and also at the rectosigmoid junction 23 cm from the anus. Both were resected and retrieved. EGD examination reported grade A esophagitis at the GE junction and patchy discontinuous erythema and aggravation of the mucosa without bleeding in the stomach body. There is normal mucosa of the duodenum. Pathology evaluation from this procedure reported moderately differentiated adenocarcinoma involving the rectal mass. The rectal sigmoid junction polyp was tubular/tubulovillous  adenoma with high grade dysplasia negative for invasive malignancy. The hepatic flexure polyp was also tubular/tubulovillous adenoma negative for high grade dysplasia or malignancy. The biopsy from the esophagus reports squamocolumnar mucosa with inflammatory changes suggestive of mild reflux esophagitis, negative for interstitial metaplasia. Gastric biopsy was benign and duodenal biopsy was also benign. There is no mention of H-pylori.     Patient was subsequently referred to colorectal surgeon Padmaja Ye MD for further evaluation. The patient had CT scan examination for chest, abdomen and pelvis requested by Dr. Ye and were done on 07/13/2019. The study reported no evidence of lymphadenopathy in the abdomen and pelvis. There was wall thickening of the rectosigmoid junction. Normal spleen, pancreas, adrenal glands and kidneys. There was fatty liver infiltration but no focal lymphatic lesions. There was a small 6-7 mm noncalcified nodule in the right upper lobe and a tiny 3 mm subpleural nodule in the right middle lobe. No mediastinal or hilar lymphadenopathy.     Dr. Ye performed staging rectal ultrasound examination. This study reported tumor penetrating through the muscularis propria as T3 disease; there were no lymph nodes identified.    She had a hospitalization in late September 2019 because of newly discovered pulmonary emboli.  She was on prophylactic Lovenox prior to the incident of PE.  Now she is on full dose Xarelto anticoagulation.  Patient reports no further chest pain dyspnea.  She denies bleeding or bruising.  During her hospitalization, she was seen by Dr. Ye, who plans to have surgery 8 to 12 weeks after finishing radiation therapy.  She finished her radiation on 9/19/2019.     I noticed patient also presented to the emergency room on 10/1/2019 complaining of right flank area pain.  I reviewed the images studies and indeed she has a very small 1 to 2 mm obstructing kidney stone in the  UV junction.  Patient is still symptomatic with some pains and dysuria.  She denies fever sweating or chills.    Laboratory study on 10/7/2019 reported normal CBC including hemoglobin 13.1, platelets 301,000, WBC 6170 and ANC 4900 lymphocytes 590 monocytes 480.      The nurse reported malfunction of port-a-catheter on 10/7/2019.  Port study on 10/8/2019 showed fibrin sheath around the distal aspect of the Mediport catheter in the SVC. This does not appear to hinder injection, but does prevent aspiration at this time.    Patient underwent surgical resection of the colon on 12/2/2019 with Dr. Ye.  Pathology evaluation reported residual T3 disease, also 1 out of 14 lymph nodes positive for malignancy.  So this patient in either had at least stage IIIb disease (T3 N1 M0?).      Adjuvant chemotherapy FOLFOX 6 will be started on 1/22/2020.  Laboratory study reported iron saturation 10%, free iron 31 TIBC 319 and ferritin 168.  Her hemoglobin was 11.8, WBC 5600, and platelets 347,000.    Patient was here on 02/12/2020 for cycle 2 of her FOLFOX.  This is delayed x1 week secondary to neutropenia.  The patient ultimately received 3 days worth of Neupogen with recovery of her blood counts.  Of note, the patient struggled with significant nausea without any episodes of vomiting following cycle #1 of chemotherapy resulting in significant weight loss.  She is up 12 pounds over the last week as her appetite has normalized.  We will add Emend to her care plan.    She is having several loose stools per her colostomy and has been hesitant to take Imodium due to prior history of constipation.  I encouraged her to try this with a maximum of 8 tablets/day.  She denies any infectious symptoms including fevers or chills.  No excess bleeding or bruising noted.  She had the expected cold sensitivity related to the Oxaliplatin for about 3 days following treatment.    Labs from 02/12/2020 demonstrated total white blood cell count of 5.14,  ANC of 3.06, hemoglobin of 11.2, platelets of 211,000.  She was found to be iron deficient last week and is intolerant to oral iron secondary to GI distress.  For this reason, she initiated IV iron therapy with Injectafer last week.  She had received her second dose 02/12/2020    Patient has normalized hemoglobin 12.2 on 2/26/2020.    Laboratory studies today, 4/15/2020, show low normal WBC 4370 including ANC 2900 and the lymphocytes 940.  Has normal platelets 169,000 and a hemoglobin 13.7.  Her CMP is unremarkable.      MEDICATIONS    Current Outpatient Medications:   •  clonazePAM (KlonoPIN) 1 MG tablet, Take 1 tablet by mouth 3 (Three) Times a Day As Needed for Anxiety or Seizures., Disp: 90 tablet, Rfl: 0  •  escitalopram (LEXAPRO) 20 MG tablet, Take 1 tablet by mouth Daily., Disp: 90 tablet, Rfl: 3  •  famotidine (PEPCID) 20 MG tablet, TAKE 1 TABLET BY MOUTH TWICE A DAY, Disp: 60 tablet, Rfl: 1  •  Filgrastim-aafi (NIVESTYM) 480 MCG/0.8ML solution prefilled syringe, Inject 480 mcg as directed Daily. For 3 days after each chemotherapy cycle. Cycles are every 2 weeks., Disp: 4.8 mL, Rfl: 6  •  HYDROcodone-acetaminophen (NORCO) 7.5-325 MG per tablet, Take 1 tablet by mouth Every 4 (Four) Hours As Needed for Moderate Pain ., Disp: 180 tablet, Rfl: 0  •  Loratadine (CLARITIN) 10 MG capsule, Take  by mouth., Disp: , Rfl:   •  metoprolol tartrate (LOPRESSOR) 25 MG tablet, TAKE 1/2 TABLET BY MOUTH EVERY 12 (TWELVE) HOURS., Disp: 60 tablet, Rfl: 1  •  ondansetron (ZOFRAN) 8 MG tablet, Take 1 tablet by mouth 3 (Three) Times a Day As Needed for Nausea or Vomiting., Disp: 60 tablet, Rfl: 5  •  phenazopyridine (PYRIDIUM) 100 MG tablet, Take 1 tablet by mouth 3 (Three) Times a Day As Needed for Bladder Spasms., Disp: 120 tablet, Rfl: 1  •  prochlorperazine (COMPAZINE) 10 MG tablet, Take 1 tablet by mouth Every 6 (Six) Hours As Needed for Nausea or Vomiting., Disp: 30 tablet, Rfl: 2  •  rivaroxaban (XARELTO) 20 MG tablet,  Take 1 tablet by mouth Daily. Start after finishing starter pack. (Patient taking differently: Take 20 mg by mouth Daily. PT HOLDING 48 HOURS PRIOR TO SURGERY), Disp: 30 tablet, Rfl: 11  No current facility-administered medications for this visit.     Facility-Administered Medications Ordered in Other Visits:   •  fluorouracil (ADRUCIL) 4,970 mg, sodium chloride 0.9 % 140.6 mL chemo infusion - FOR HOME USE, 2,400 mg/m2 (Treatment Plan Recorded), Intravenous, Once, Dong Nguyen MD PhD, 4,970 mg at 04/15/20 1349    ALLERGIES:   No Known Allergies    SOCIAL HISTORY:       Social History     Tobacco Use   • Smoking status: Former Smoker     Packs/day: 1.00     Years: 24.00     Pack years: 24.00     Types: Electronic Cigarette, Cigarettes     Last attempt to quit: 2019     Years since quittin.5   • Smokeless tobacco: Never Used   Substance Use Topics   • Alcohol use: Yes     Comment: Rarely   • Drug use: No         FAMILY HISTORY:   Mother has positive factor V Leiden mutation, although she did not have thrombosis, mother also is coronary disease with stenting, she also is occasional bruising.  Maternal grandmother had DVT, she had multiple surgical procedures.  Patient mother's half-brother had metastatic colon cancer at diagnosis in his 50s.  Maternal great aunt has breast cancer.  Patient will follow his skin cancer in his 60s, exclusive.    REVIEW OF SYSTEMS:  Review of Systems   Constitutional: Positive for fatigue. Negative for appetite change, chills, diaphoresis and fever.   HENT: Negative for congestion, hearing loss, mouth sores and trouble swallowing.    Eyes: Negative for photophobia and visual disturbance.   Respiratory: Negative for cough, chest tightness and shortness of breath.    Cardiovascular: Negative for chest pain, palpitations and leg swelling.   Gastrointestinal: Positive for diarrhea (loose output.) and nausea (controlled with zofran). Negative for abdominal distention, abdominal  "pain, anal bleeding, constipation, rectal pain and vomiting.   Endocrine: Positive for cold intolerance. Negative for heat intolerance.   Genitourinary: Negative for frequency, hematuria and urgency.   Musculoskeletal: Negative for arthralgias, back pain and joint swelling.   Skin: Negative for rash and wound.   Allergic/Immunologic: Positive for immunocompromised state (Secondary to adjuvant chemotherapy). Negative for environmental allergies and food allergies.   Neurological: Positive for numbness (cold exposure. ). Negative for dizziness, speech difficulty and headaches.   Hematological: Negative for adenopathy. Does not bruise/bleed easily.   Psychiatric/Behavioral: Negative for agitation, behavioral problems, confusion and suicidal ideas.          Vitals:    04/15/20 0847   BP: 146/91   Pulse: 104   Resp: 16   Temp: 97.9 °F (36.6 °C)   SpO2: 96%   Weight: 101 kg (222 lb 3.2 oz)   Height: 166 cm (65.35\")   PainSc:   2   PainLoc: Comment: stomach pain that comes and goes     Current Status 4/15/2020   ECOG score 0     PHYSICAL EXAM:    GENERAL:  Well-developed, well-nourished  female in no acute distress.  SKIN:  Warm, dry without rashes, purpura or petechiae.  EYES:  Pupils equal, round and reactive to light.  EOMs intact.  Conjunctivae normal.  EARS:  Hearing intact.  CHEST:  Breathing unlabored, no acute distress.  Clear breathing sounds bilaterally.  Mediport in the left chest wall, benign looking.   CARDIAC:  Regular rate and rhythm without murmurs. Normal S1,S2.   ABDOMEN: No tenderness.  No mass palpable.  Bowel sounds present.  Ostomy in place with moderate brown/yellow fecal matter.   EXTREMITIES:  No clubbing, cyanosis or edema.  NEUROLOGICAL:  Cranial Nerves II-XII grossly intact.  No focal neurological deficits.  PSYCHIATRIC:  Normal affect and mood.        RECENT LABS:  Results from last 7 days   Lab Units 04/15/20  0800   WBC 10*3/mm3 4.37   NEUTROS ABS 10*3/mm3 2.90   HEMOGLOBIN g/dL 13.7 "   HEMATOCRIT % 41.8   PLATELETS 10*3/mm3 169     Results from last 7 days   Lab Units 04/15/20  0949 04/15/20  0800   SODIUM mmol/L  --  138   POTASSIUM mmol/L  --  3.7   CHLORIDE mmol/L  --  99   CO2 mmol/L  --  24.0   BUN mg/dL  --  9   CREATININE mg/dL  --  0.67   CALCIUM mg/dL  --  9.4   ALBUMIN g/dL  --  3.50   BILIRUBIN mg/dL  --  0.3   ALK PHOS U/L  --  94   ALT (SGPT) U/L  --  26   AST (SGOT) U/L  --  22   GLUCOSE mg/dL  --  123   MAGNESIUM mg/dL 1.7*  --          Assessment/Plan    1.  Newly diagnosed rectal cancer, rectal ultrasound examination reported T3N0 disease without lymphadenopathy. CT scan of chest, abdomen and pelvis reported no lymphadenopathy in the abdomen, pelvis or chest. She does have small pulmonary nodule 6-7 mm and 2 mm with indeterminate feature. There is no solid evidence of metastatic disease otherwise.   · Based on the CT scan and rectal ultrasound imaging studies, this patient had stage IIA (T3N0M0) disease.    · She had PET scan examination on 8/8/2019 which reported a hypermetabolic right upper lobe pulmonary nodule 6 mm with SUV 5.6.  This is suspicious for metastatic disease.  This patient may have stage IV disease.   · She initiated concurrent radiation with continuous 5-FU on 8/12/2019.  · Patient finished concurrent chemoradiation therapy.  · Patient underwent surgical resection of the rectal tumor and diverting loop ileostomy on 12/2/2019 with Dr. Ye.  Pathology evaluation reported residual T3 disease, with 1 out of 14 lymph nodes positive for malignancy.  Certainly this patient has at least stage IIIb rectal cancer (T3 N1 M0?)  · On 1/22/2020 adjuvant chemotherapy FOLFOX 6 regimen initiated.    · On 2/5/2022 cycle #2 was delayed secondary to neutropenia.  She was given 3 days of Granix.   · Emend added with cycle 3.  With improved nausea control  · Continuing home Granix x3 days following 5-FU disconnect  · 3/11/2020 due for cycle 4, however, she is experiencing  "progressive abdominal pain and occasional fevers.   · CT scan performed on 3/13/2020 reveals no evidence of progressive or recurrent disease.  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  · On 4/1/2020 patient received cycle #5 adjuvant FOLFOX 6  · Patient is due today for cycle #6 adjuvant FOLFOX 6. We will continue with treatment today.      2 .  Pulmonary emboli with background of positive factor V Leiden mutation   · The patient has heterozygous factor V Leiden mutation and therefore was on low-dose anticoagulation with Lovenox, 40 mg daily given her increased risk of thrombosis with MediPort and GI malignancy.    · Nevertheless this patient was found to having pulmonary emboli on 9/20/2019 despite low-dose Lovenox.  She had a brief hospitalization in late September 2019, she was started on full dose Xarelto anticoagulation per protocol.    · The patient continues on Xarelto and is tolerating this well without bleeding issues.    3.  Loose, frequent stools:   · Patient is s/p surgical resection of the rectum tumor and diverting loop ileostomy on 12/2/2019 with Dr. Ye.  · She reports having liquid stools in her colostomy bag.  She reports her liquid stools typically appear \"clear mustard yellow\" or dark.  · On 4/1/2020 patient was recommended to start on Bentyl    · She states today that she is having some output at the ostomy bag which is abnormal. Therefore she made an appt with  for 05/15/2020.    4. Pelvic pain: Related to previous radiation therapy and the surgery for resection of the primary rectal cancer.  Stable see above.    5. Hypokalemia: She is currently taking 20 mg of potassium every other day.    · Her potassium on 4/1/2020 was 4.1.  · Her potassium today is 3.7 on 4/15/2020.    6.  This patient has also strong family history for malignancy the patient had appointment with genetic counseling on September 3, 2019. NF1 [id number] 3C >T.    7.  Nausea: This does seem to be multifactorial with a " large part due to anxiety.  The patient does continue on Lexapro at 20 mg daily as well as Klonopin 0.5 mg once daily as needed.      · Emend was added with cycle 3 with much better management of nausea  · She can continue to utilize Zofran and Compazine as needed    8.  Mild anemia, improved since her surgery.  She also has a history of iron deficiency.  Iron studies reveal a saturation of just 10%.  She was started on oral iron but was unable to tolerate due to GI side effects.   · Status post Injectafer 2/5/2020 and 2/12/2020   · Hemoglobin was within normal limits on 4/1/2020 at 13.1  · Hemoglobin today is within normal limits at 13.7    9.  Neutropenia secondary to chemotherapy.  · As noted above, the patient will receive 3 days of Granix begin on day 4 of each cycle of chemotherapy.  We obtained approval for self administration at home.  This started following cycle 3 chemotherapy  · Total white count on 4/1/2020 was 3930, ANC 2540  · Total white count today is 4370, ANC 2900    10.  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    ·  Dr. Ye and I have had the opportunity to review the patient's CT scan and feel comfortable with her proceeding with treatment.  She is scheduled to follow-up with Dr. Ye in May    11.  Cold sensitivity secondary to oxaliplatin  · Lasting 10 days to 2 weeks with numbness almost constantly.  · Patient began B complex on 4/1/2020  · Continue B complex      12.  Hypomagnesemia.  · Her magnesium is marginally low at 1.7 today on 4/15/2020.  Because of loose bowel movement, will not start oral magnesium oxide.  Instead we will repeat labs in 2 weeks for monitoring.  If needed, we will give her intravenous magnesium supplementation.    13.  Peripheral neuropathy secondary to chemotherapy.  This is mild involving bilateral hands.  However her cholecystectomy actually last longer.  She is aware not to drink or eat cold food/beverage nor touch cold objects.      PLAN:  1. Proceed with cycle #6  FOLFOX 6.  2. Continue home Granix injections x3 days starting day #4.   3. Continue compazine and zofran as needed for nausea.   4. Continue Bentyl 20 mg 4 times daily as needed for abdominal cramping  5. Continue B complex vitamin  6. Follow-up with Dr. Ye for abnormal ostomy output.  7. Return in 2 weeks with labs per protocol and for re-evaluation with NP and cycle #7 adjuvant FOLFOX 6  8. Follow-up with me 4 weeks with labs per protocol and cycle #8 FOLFOX 6.  May consider CT scan examination after cycle #8 treatment.    By signing my name here, I Alexandru Hunt MD PhD, attest that all documentation on 04/15/20 at 17:07 has been prepared under the direction and in the presence of Dr. Alexandru Hunt MD.    I reviewed the note scribed by Aric Smith and made appropriate corrections.      ALEXANDRU HUNT M.D., Ph.D.        CC: Minesh Ye MD

## 2020-04-17 ENCOUNTER — INFUSION (OUTPATIENT)
Dept: ONCOLOGY | Facility: HOSPITAL | Age: 40
End: 2020-04-17

## 2020-04-17 DIAGNOSIS — C20 ADENOCARCINOMA OF RECTUM (HCC): Primary | ICD-10-CM

## 2020-04-17 DIAGNOSIS — Z45.2 ENCOUNTER FOR FITTING AND ADJUSTMENT OF VASCULAR CATHETER: ICD-10-CM

## 2020-04-17 PROCEDURE — 25010000003 HEPARIN LOCK FLUCH PER 10 UNITS: Performed by: INTERNAL MEDICINE

## 2020-04-17 RX ORDER — HEPARIN SODIUM (PORCINE) LOCK FLUSH IV SOLN 100 UNIT/ML 100 UNIT/ML
500 SOLUTION INTRAVENOUS AS NEEDED
Status: CANCELLED | OUTPATIENT
Start: 2020-04-17

## 2020-04-17 RX ORDER — SODIUM CHLORIDE 0.9 % (FLUSH) 0.9 %
10 SYRINGE (ML) INJECTION AS NEEDED
Status: DISCONTINUED | OUTPATIENT
Start: 2020-04-17 | End: 2020-04-17 | Stop reason: HOSPADM

## 2020-04-17 RX ORDER — HEPARIN SODIUM (PORCINE) LOCK FLUSH IV SOLN 100 UNIT/ML 100 UNIT/ML
500 SOLUTION INTRAVENOUS AS NEEDED
Status: DISCONTINUED | OUTPATIENT
Start: 2020-04-17 | End: 2020-04-17 | Stop reason: HOSPADM

## 2020-04-17 RX ORDER — SODIUM CHLORIDE 0.9 % (FLUSH) 0.9 %
10 SYRINGE (ML) INJECTION AS NEEDED
Status: CANCELLED | OUTPATIENT
Start: 2020-04-17

## 2020-04-17 RX ADMIN — Medication 500 UNITS: at 10:29

## 2020-04-17 RX ADMIN — SODIUM CHLORIDE, PRESERVATIVE FREE 10 ML: 5 INJECTION INTRAVENOUS at 10:29

## 2020-04-24 ENCOUNTER — APPOINTMENT (OUTPATIENT)
Dept: CT IMAGING | Facility: HOSPITAL | Age: 40
End: 2020-04-24

## 2020-04-24 ENCOUNTER — HOSPITAL ENCOUNTER (OUTPATIENT)
Facility: HOSPITAL | Age: 40
Setting detail: OBSERVATION
Discharge: HOME | End: 2020-04-26
Attending: EMERGENCY MEDICINE | Admitting: HOSPITALIST

## 2020-04-24 DIAGNOSIS — E87.20 LACTIC ACIDOSIS: ICD-10-CM

## 2020-04-24 DIAGNOSIS — N30.01 ACUTE CYSTITIS WITH HEMATURIA: ICD-10-CM

## 2020-04-24 DIAGNOSIS — Z79.899 ON ANTINEOPLASTIC CHEMOTHERAPY: Primary | ICD-10-CM

## 2020-04-24 DIAGNOSIS — E86.0 DEHYDRATION: ICD-10-CM

## 2020-04-24 DIAGNOSIS — R00.0 TACHYCARDIA: ICD-10-CM

## 2020-04-24 LAB
ALBUMIN SERPL-MCNC: 3.6 G/DL (ref 3.5–5.2)
ALBUMIN/GLOB SERPL: 1.1 G/DL
ALP SERPL-CCNC: 111 U/L (ref 39–117)
ALT SERPL W P-5'-P-CCNC: 37 U/L (ref 1–33)
ANION GAP SERPL CALCULATED.3IONS-SCNC: 13.9 MMOL/L (ref 5–15)
AST SERPL-CCNC: 25 U/L (ref 1–32)
BACTERIA UR QL AUTO: ABNORMAL /HPF
BILIRUB SERPL-MCNC: 0.4 MG/DL (ref 0.2–1.2)
BILIRUB UR QL STRIP: NEGATIVE
BUN BLD-MCNC: 9 MG/DL (ref 6–20)
BUN/CREAT SERPL: 10.7 (ref 7–25)
CALCIUM SPEC-SCNC: 8.8 MG/DL (ref 8.6–10.5)
CHLORIDE SERPL-SCNC: 102 MMOL/L (ref 98–107)
CLARITY UR: ABNORMAL
CO2 SERPL-SCNC: 22.1 MMOL/L (ref 22–29)
COLOR UR: ABNORMAL
CREAT BLD-MCNC: 0.84 MG/DL (ref 0.57–1)
D-LACTATE SERPL-SCNC: 2.5 MMOL/L (ref 0.5–2)
DEPRECATED RDW RBC AUTO: 78.1 FL (ref 37–54)
EOSINOPHIL # BLD MANUAL: 0.14 10*3/MM3 (ref 0–0.4)
EOSINOPHIL NFR BLD MANUAL: 3 % (ref 0.3–6.2)
ERYTHROCYTE [DISTWIDTH] IN BLOOD BY AUTOMATED COUNT: 23.7 % (ref 12.3–15.4)
GFR SERPL CREATININE-BSD FRML MDRD: 75 ML/MIN/1.73
GLOBULIN UR ELPH-MCNC: 3.3 GM/DL
GLUCOSE BLD-MCNC: 148 MG/DL (ref 65–99)
GLUCOSE UR STRIP-MCNC: NEGATIVE MG/DL
HCT VFR BLD AUTO: 39.7 % (ref 34–46.6)
HGB BLD-MCNC: 13.3 G/DL (ref 12–15.9)
HGB UR QL STRIP.AUTO: ABNORMAL
HYALINE CASTS UR QL AUTO: ABNORMAL /LPF
KETONES UR QL STRIP: ABNORMAL
LEUKOCYTE ESTERASE UR QL STRIP.AUTO: ABNORMAL
LYMPHOCYTES # BLD MANUAL: 0.67 10*3/MM3 (ref 0.7–3.1)
LYMPHOCYTES NFR BLD MANUAL: 14.1 % (ref 19.6–45.3)
LYMPHOCYTES NFR BLD MANUAL: 21.2 % (ref 5–12)
MCH RBC QN AUTO: 31 PG (ref 26.6–33)
MCHC RBC AUTO-ENTMCNC: 33.5 G/DL (ref 31.5–35.7)
MCV RBC AUTO: 92.5 FL (ref 79–97)
MONOCYTES # BLD AUTO: 1 10*3/MM3 (ref 0.1–0.9)
NEUTROPHILS # BLD AUTO: 2.92 10*3/MM3 (ref 1.7–7)
NEUTROPHILS NFR BLD MANUAL: 61.6 % (ref 42.7–76)
NITRITE UR QL STRIP: POSITIVE
PH UR STRIP.AUTO: 6.5 [PH] (ref 5–8)
PLAT MORPH BLD: NORMAL
PLATELET # BLD AUTO: 114 10*3/MM3 (ref 140–450)
PMV BLD AUTO: 9.3 FL (ref 6–12)
POTASSIUM BLD-SCNC: 3.3 MMOL/L (ref 3.5–5.2)
PROT SERPL-MCNC: 6.9 G/DL (ref 6–8.5)
PROT UR QL STRIP: ABNORMAL
RBC # BLD AUTO: 4.29 10*6/MM3 (ref 3.77–5.28)
RBC # UR: ABNORMAL /HPF
RBC MORPH BLD: NORMAL
REF LAB TEST METHOD: ABNORMAL
SODIUM BLD-SCNC: 138 MMOL/L (ref 136–145)
SP GR UR STRIP: 1.02 (ref 1–1.03)
SQUAMOUS #/AREA URNS HPF: ABNORMAL /HPF
UROBILINOGEN UR QL STRIP: ABNORMAL
WBC MORPH BLD: NORMAL
WBC NRBC COR # BLD: 4.74 10*3/MM3 (ref 3.4–10.8)
WBC UR QL AUTO: ABNORMAL /HPF

## 2020-04-24 PROCEDURE — 96361 HYDRATE IV INFUSION ADD-ON: CPT

## 2020-04-24 PROCEDURE — 74177 CT ABD & PELVIS W/CONTRAST: CPT

## 2020-04-24 PROCEDURE — 87186 SC STD MICRODIL/AGAR DIL: CPT | Performed by: EMERGENCY MEDICINE

## 2020-04-24 PROCEDURE — 83605 ASSAY OF LACTIC ACID: CPT | Performed by: PHYSICIAN ASSISTANT

## 2020-04-24 PROCEDURE — 99284 EMERGENCY DEPT VISIT MOD MDM: CPT

## 2020-04-24 PROCEDURE — 85007 BL SMEAR W/DIFF WBC COUNT: CPT | Performed by: PHYSICIAN ASSISTANT

## 2020-04-24 PROCEDURE — 25010000002 CEFTRIAXONE PER 250 MG: Performed by: PHYSICIAN ASSISTANT

## 2020-04-24 PROCEDURE — 85025 COMPLETE CBC W/AUTO DIFF WBC: CPT | Performed by: PHYSICIAN ASSISTANT

## 2020-04-24 PROCEDURE — 81001 URINALYSIS AUTO W/SCOPE: CPT

## 2020-04-24 PROCEDURE — 80053 COMPREHEN METABOLIC PANEL: CPT | Performed by: PHYSICIAN ASSISTANT

## 2020-04-24 PROCEDURE — 36415 COLL VENOUS BLD VENIPUNCTURE: CPT

## 2020-04-24 PROCEDURE — 96365 THER/PROPH/DIAG IV INF INIT: CPT

## 2020-04-24 PROCEDURE — 87088 URINE BACTERIA CULTURE: CPT | Performed by: EMERGENCY MEDICINE

## 2020-04-24 PROCEDURE — 87086 URINE CULTURE/COLONY COUNT: CPT

## 2020-04-24 RX ORDER — CEFTRIAXONE SODIUM 1 G/50ML
1 INJECTION, SOLUTION INTRAVENOUS ONCE
Status: COMPLETED | OUTPATIENT
Start: 2020-04-24 | End: 2020-04-24

## 2020-04-24 RX ORDER — SODIUM CHLORIDE 0.9 % (FLUSH) 0.9 %
10 SYRINGE (ML) INJECTION AS NEEDED
Status: DISCONTINUED | OUTPATIENT
Start: 2020-04-24 | End: 2020-04-26 | Stop reason: HOSPADM

## 2020-04-24 RX ORDER — PHENAZOPYRIDINE HYDROCHLORIDE 100 MG/1
200 TABLET, FILM COATED ORAL ONCE
Status: COMPLETED | OUTPATIENT
Start: 2020-04-24 | End: 2020-04-24

## 2020-04-24 RX ADMIN — PHENAZOPYRIDINE 200 MG: 100 TABLET ORAL at 23:37

## 2020-04-24 RX ADMIN — SODIUM CHLORIDE 1000 ML: 9 INJECTION, SOLUTION INTRAVENOUS at 23:26

## 2020-04-24 RX ADMIN — CEFTRIAXONE SODIUM 1 G: 1 INJECTION, SOLUTION INTRAVENOUS at 23:27

## 2020-04-25 PROBLEM — D69.6 THROMBOCYTOPENIA (HCC): Chronic | Status: ACTIVE | Noted: 2020-04-25

## 2020-04-25 PROBLEM — Z79.01 CHRONIC ANTICOAGULATION: Chronic | Status: ACTIVE | Noted: 2020-04-25

## 2020-04-25 PROBLEM — Z79.69 ON ANTINEOPLASTIC CHEMOTHERAPY: Status: ACTIVE | Noted: 2020-04-25

## 2020-04-25 PROBLEM — Z93.2 ILEOSTOMY PRESENT (HCC): Chronic | Status: ACTIVE | Noted: 2020-04-25

## 2020-04-25 PROBLEM — I10 HTN (HYPERTENSION): Status: ACTIVE | Noted: 2020-04-25

## 2020-04-25 PROBLEM — Z79.899 ON ANTINEOPLASTIC CHEMOTHERAPY: Status: ACTIVE | Noted: 2020-04-25

## 2020-04-25 PROBLEM — I10 HTN (HYPERTENSION): Chronic | Status: ACTIVE | Noted: 2020-04-25

## 2020-04-25 PROBLEM — D69.6 THROMBOCYTOPENIA (HCC): Status: ACTIVE | Noted: 2020-04-25

## 2020-04-25 LAB
ANION GAP SERPL CALCULATED.3IONS-SCNC: 12.5 MMOL/L (ref 5–15)
BUN BLD-MCNC: 9 MG/DL (ref 6–20)
BUN/CREAT SERPL: 13.6 (ref 7–25)
CALCIUM SPEC-SCNC: 8 MG/DL (ref 8.6–10.5)
CHLORIDE SERPL-SCNC: 104 MMOL/L (ref 98–107)
CO2 SERPL-SCNC: 21.5 MMOL/L (ref 22–29)
CREAT BLD-MCNC: 0.66 MG/DL (ref 0.57–1)
D-LACTATE SERPL-SCNC: 1.4 MMOL/L (ref 0.5–2)
DEPRECATED RDW RBC AUTO: 76.7 FL (ref 37–54)
ERYTHROCYTE [DISTWIDTH] IN BLOOD BY AUTOMATED COUNT: 23.2 % (ref 12.3–15.4)
GFR SERPL CREATININE-BSD FRML MDRD: 99 ML/MIN/1.73
GLUCOSE BLD-MCNC: 120 MG/DL (ref 65–99)
HCT VFR BLD AUTO: 35 % (ref 34–46.6)
HGB BLD-MCNC: 11.7 G/DL (ref 12–15.9)
LACTATE HOLD SPECIMEN: NORMAL
MAGNESIUM SERPL-MCNC: 1.7 MG/DL (ref 1.6–2.6)
MCH RBC QN AUTO: 31.2 PG (ref 26.6–33)
MCHC RBC AUTO-ENTMCNC: 33.4 G/DL (ref 31.5–35.7)
MCV RBC AUTO: 93.3 FL (ref 79–97)
PLATELET # BLD AUTO: 82 10*3/MM3 (ref 140–450)
PMV BLD AUTO: 9.3 FL (ref 6–12)
POTASSIUM BLD-SCNC: 3.4 MMOL/L (ref 3.5–5.2)
RBC # BLD AUTO: 3.75 10*6/MM3 (ref 3.77–5.28)
SODIUM BLD-SCNC: 138 MMOL/L (ref 136–145)
WBC NRBC COR # BLD: 4.64 10*3/MM3 (ref 3.4–10.8)

## 2020-04-25 PROCEDURE — G0378 HOSPITAL OBSERVATION PER HR: HCPCS

## 2020-04-25 PROCEDURE — 80048 BASIC METABOLIC PNL TOTAL CA: CPT | Performed by: NURSE PRACTITIONER

## 2020-04-25 PROCEDURE — 96361 HYDRATE IV INFUSION ADD-ON: CPT

## 2020-04-25 PROCEDURE — 83735 ASSAY OF MAGNESIUM: CPT | Performed by: HOSPITALIST

## 2020-04-25 PROCEDURE — 25010000002 IOPAMIDOL 61 % SOLUTION: Performed by: EMERGENCY MEDICINE

## 2020-04-25 PROCEDURE — 96366 THER/PROPH/DIAG IV INF ADDON: CPT

## 2020-04-25 PROCEDURE — 25010000002 CEFTRIAXONE PER 250 MG: Performed by: NURSE PRACTITIONER

## 2020-04-25 PROCEDURE — 83605 ASSAY OF LACTIC ACID: CPT | Performed by: PHYSICIAN ASSISTANT

## 2020-04-25 PROCEDURE — 87040 BLOOD CULTURE FOR BACTERIA: CPT | Performed by: PHYSICIAN ASSISTANT

## 2020-04-25 PROCEDURE — 85027 COMPLETE CBC AUTOMATED: CPT | Performed by: NURSE PRACTITIONER

## 2020-04-25 RX ORDER — SODIUM CHLORIDE 9 MG/ML
100 INJECTION, SOLUTION INTRAVENOUS CONTINUOUS
Status: DISCONTINUED | OUTPATIENT
Start: 2020-04-25 | End: 2020-04-26 | Stop reason: HOSPADM

## 2020-04-25 RX ORDER — FAMOTIDINE 20 MG/1
20 TABLET, FILM COATED ORAL 2 TIMES DAILY
Status: DISCONTINUED | OUTPATIENT
Start: 2020-04-25 | End: 2020-04-26 | Stop reason: HOSPADM

## 2020-04-25 RX ORDER — POTASSIUM CHLORIDE 1.5 G/1.77G
40 POWDER, FOR SOLUTION ORAL AS NEEDED
Status: DISCONTINUED | OUTPATIENT
Start: 2020-04-25 | End: 2020-04-26 | Stop reason: HOSPADM

## 2020-04-25 RX ORDER — POTASSIUM CHLORIDE 750 MG/1
40 CAPSULE, EXTENDED RELEASE ORAL AS NEEDED
Status: DISCONTINUED | OUTPATIENT
Start: 2020-04-25 | End: 2020-04-26 | Stop reason: HOSPADM

## 2020-04-25 RX ORDER — SODIUM CHLORIDE 0.9 % (FLUSH) 0.9 %
10 SYRINGE (ML) INJECTION EVERY 12 HOURS SCHEDULED
Status: DISCONTINUED | OUTPATIENT
Start: 2020-04-25 | End: 2020-04-26 | Stop reason: HOSPADM

## 2020-04-25 RX ORDER — ACETAMINOPHEN 650 MG/1
650 SUPPOSITORY RECTAL EVERY 4 HOURS PRN
Status: DISCONTINUED | OUTPATIENT
Start: 2020-04-25 | End: 2020-04-26 | Stop reason: HOSPADM

## 2020-04-25 RX ORDER — CEFTRIAXONE SODIUM 1 G/50ML
1 INJECTION, SOLUTION INTRAVENOUS EVERY 24 HOURS
Status: DISCONTINUED | OUTPATIENT
Start: 2020-04-25 | End: 2020-04-26 | Stop reason: HOSPADM

## 2020-04-25 RX ORDER — ACETAMINOPHEN 325 MG/1
650 TABLET ORAL EVERY 4 HOURS PRN
Status: DISCONTINUED | OUTPATIENT
Start: 2020-04-25 | End: 2020-04-26 | Stop reason: HOSPADM

## 2020-04-25 RX ORDER — ESCITALOPRAM OXALATE 20 MG/1
20 TABLET ORAL DAILY
Status: DISCONTINUED | OUTPATIENT
Start: 2020-04-25 | End: 2020-04-26 | Stop reason: HOSPADM

## 2020-04-25 RX ORDER — NITROGLYCERIN 0.4 MG/1
0.4 TABLET SUBLINGUAL
Status: DISCONTINUED | OUTPATIENT
Start: 2020-04-25 | End: 2020-04-26 | Stop reason: HOSPADM

## 2020-04-25 RX ORDER — POTASSIUM CHLORIDE 7.45 MG/ML
10 INJECTION INTRAVENOUS
Status: DISCONTINUED | OUTPATIENT
Start: 2020-04-25 | End: 2020-04-26 | Stop reason: HOSPADM

## 2020-04-25 RX ORDER — CLONAZEPAM 1 MG/1
1 TABLET ORAL 3 TIMES DAILY PRN
Status: DISCONTINUED | OUTPATIENT
Start: 2020-04-25 | End: 2020-04-26 | Stop reason: HOSPADM

## 2020-04-25 RX ORDER — CALCIUM CARBONATE 200(500)MG
2 TABLET,CHEWABLE ORAL 2 TIMES DAILY PRN
Status: DISCONTINUED | OUTPATIENT
Start: 2020-04-25 | End: 2020-04-26 | Stop reason: HOSPADM

## 2020-04-25 RX ORDER — SODIUM CHLORIDE 0.9 % (FLUSH) 0.9 %
10 SYRINGE (ML) INJECTION AS NEEDED
Status: DISCONTINUED | OUTPATIENT
Start: 2020-04-25 | End: 2020-04-26 | Stop reason: HOSPADM

## 2020-04-25 RX ORDER — HYDROCODONE BITARTRATE AND ACETAMINOPHEN 7.5; 325 MG/1; MG/1
1 TABLET ORAL EVERY 6 HOURS PRN
Status: DISCONTINUED | OUTPATIENT
Start: 2020-04-25 | End: 2020-04-26 | Stop reason: HOSPADM

## 2020-04-25 RX ORDER — ACETAMINOPHEN 160 MG/5ML
650 SOLUTION ORAL EVERY 4 HOURS PRN
Status: DISCONTINUED | OUTPATIENT
Start: 2020-04-25 | End: 2020-04-26 | Stop reason: HOSPADM

## 2020-04-25 RX ADMIN — SODIUM CHLORIDE 100 ML/HR: 9 INJECTION, SOLUTION INTRAVENOUS at 03:18

## 2020-04-25 RX ADMIN — SODIUM CHLORIDE 1000 ML: 9 INJECTION, SOLUTION INTRAVENOUS at 00:59

## 2020-04-25 RX ADMIN — SODIUM CHLORIDE, PRESERVATIVE FREE 10 ML: 5 INJECTION INTRAVENOUS at 20:30

## 2020-04-25 RX ADMIN — METOPROLOL TARTRATE 12.5 MG: 25 TABLET ORAL at 20:30

## 2020-04-25 RX ADMIN — IOPAMIDOL 85 ML: 612 INJECTION, SOLUTION INTRAVENOUS at 00:01

## 2020-04-25 RX ADMIN — RIVAROXABAN 20 MG: 20 TABLET, FILM COATED ORAL at 08:25

## 2020-04-25 RX ADMIN — METOPROLOL TARTRATE 12.5 MG: 25 TABLET ORAL at 08:24

## 2020-04-25 RX ADMIN — POTASSIUM CHLORIDE 40 MEQ: 10 CAPSULE, COATED, EXTENDED RELEASE ORAL at 18:59

## 2020-04-25 RX ADMIN — ESCITALOPRAM 20 MG: 20 TABLET, FILM COATED ORAL at 08:25

## 2020-04-25 RX ADMIN — SODIUM CHLORIDE 100 ML/HR: 9 INJECTION, SOLUTION INTRAVENOUS at 13:29

## 2020-04-25 RX ADMIN — CEFTRIAXONE SODIUM 1 G: 1 INJECTION, SOLUTION INTRAVENOUS at 22:52

## 2020-04-25 RX ADMIN — SODIUM CHLORIDE 100 ML/HR: 9 INJECTION, SOLUTION INTRAVENOUS at 22:52

## 2020-04-25 RX ADMIN — SODIUM CHLORIDE, PRESERVATIVE FREE 10 ML: 5 INJECTION INTRAVENOUS at 03:21

## 2020-04-25 RX ADMIN — FAMOTIDINE 20 MG: 20 TABLET, FILM COATED ORAL at 20:30

## 2020-04-25 RX ADMIN — POTASSIUM CHLORIDE 40 MEQ: 10 CAPSULE, COATED, EXTENDED RELEASE ORAL at 08:24

## 2020-04-25 RX ADMIN — FAMOTIDINE 20 MG: 20 TABLET, FILM COATED ORAL at 08:25

## 2020-04-25 NOTE — ED PROVIDER NOTES
EMERGENCY DEPARTMENT ENCOUNTER    Room Number:  E551/1  Date of encounter:  4/25/2020  PCP: Deepika Vela III, NP-C  Historian: Patient      HPI:  Chief Complaint: Dysuria and frequency    Context: Carole Weaver is a 39 y.o. female who presents to the ED c/o complaining of dysuria, frequency, urgency that is been ongoing for about 24 hours.  The patient is currently on chemo for colorectal cancer and is being followed by Dr. Ольга Ye.  She had a resection and colostomy placed on December 2, 2019.  She had a CT March 11, 2020 that suggested she had perhaps developed a fistula at the anastomosis site with the vagina.  She denies fever, chills, chest pain, shortness of breath, but does have some mild lower abdominal cramping associated with the dysuria.  There are no other symptoms to report at this time.      PAST MEDICAL HISTORY  Active Ambulatory Problems     Diagnosis Date Noted   • Anxiety 08/09/2016   • Irritable bowel syndrome 08/09/2016   • Immunization due 11/08/2018   • Monopolar depression (CMS/[facility]) 05/09/2019   • Adenocarcinoma of rectum (CMS/[facility]) 07/12/2019   • Family history of factor V Leiden mutation 08/01/2019   • Heterozygous factor V Leiden mutation (CMS/[facility]) 08/02/2019   • Encounter for fitting and adjustment of vascular catheter 08/07/2019   • Functional diarrhea 09/03/2019   • Adverse effect of antineoplastic and immunosuppressive drugs, initial encounter 09/03/2019   • Hypokalemia 09/03/2019   • Hypomagnesemia 09/03/2019   • Other pulmonary embolism without acute cor pulmonale (CMS/[facility]) 09/20/2019   • UTI (urinary tract infection) 09/20/2019   • Kidney stone on right side 10/07/2019   • Hydronephrosis with ureteropelvic junction (UPJ) obstruction 10/15/2019   • Partial intestinal obstruction, unspecified as to cause (CMS/[facility]) 07/08/2019   • Irregular heart beat 12/10/2019   • Hemorrhoids, external 12/10/2019   • Hematochezia 12/10/2019   • Gastrointestinal hemorrhage,  unspecified 2019   • Esophagitis 2019   • Family history of colonic polyps 12/10/2019   • Chronic diarrhea 12/10/2019   • Calculus of gallbladder 12/10/2019   • Benign neoplasm of transverse colon 2019   • Benign neoplasm of rectum and anal canal 2019   • Loss of weight 12/10/2019   • Heart murmur 12/10/2019   • Anemia 2020   • Anticoagulation adequate 2020   • Pain associated with surgical procedure 2020   • Iron deficiency 2020   • Adverse effect of iron 2020   • Drug-induced peripheral neuropathy (CMS/HCC) 04/15/2020     Resolved Ambulatory Problems     Diagnosis Date Noted   • Rectal cancer (CMS/HCC) 2019     Past Medical History:   Diagnosis Date   • Abnormal Pap smear of cervix 1998   • Anemia in pregnancy    • Cervical lymphadenitis 2012   • Colon polyps    • Depression    • Factor V Leiden (CMS/HCC)    • GERD (gastroesophageal reflux disease)    • Hemorrhoids    • History of chemotherapy    • History of gestational diabetes    • History of pre-eclampsia    • History of radiation therapy    • History of TB skin testing 2009   • HPV in female    • IBS (irritable bowel syndrome)    • Infected insect bite of neck 2016   • Kidney stones 2007   • Lumbar disc disease    • On anticoagulant therapy    • PIH (pregnancy induced hypertension)    • Pulmonary embolism (CMS/HCC) 2019   • Right leg pain    • Right ureteral stone 10/01/2019   • SAB (spontaneous ) 1996   • Sepsis due to cellulitis (CMS/HCC) 2002         PAST SURGICAL HISTORY  Past Surgical History:   Procedure Laterality Date   • CHOLECYSTECTOMY N/A 10/10/2003    LAPAROSCOPIC WITH CHOLANGIOGRAM, DR. JAMEY TALAVERA AT [facility]   • COLON RESECTION N/A 2019    Procedure: laparoscopic low anterior colon resection with mobilization of splenic flexure and diverting loop ileostomy: ERAS;  Surgeon: Padmaja Ye MD;  Location[facility]  MAIN OR;  Service: General   • COLONOSCOPY W/ POLYPECTOMY N/A 07/08/2019    15 MM TUBULOVILLOUS ADENOMA POLYP IN HEPATIC FLEXURE, 20 MMTUBULOVILLOUS ADENOMA WITH HIGH GRADE DYSPLASIA IN RECTOSIGMOID, 4 CM MASS IN MID RECTUM, PATH: INVASIVE MODERATELY DIFFERENTIATED ADENOCARCINOMA, DR. JENNIFER LI AT [facility]   • DILATATION AND EVACUATION N/A 2009   • ENDOSCOPY N/A 07/08/2019    LA GRADE A ESOPHAGITIS, GASTRITIS, ALL BIOPSIES BENIGN, DR. JENNIFER LI AT [facility]   • PAP SMEAR N/A 01/24/2014   • SIGMOIDOSCOPY N/A 7/24/2019    INFILTRATIVE PARTIALLY OBSTRUCTING LARGE RECTAL CANCER, AREA TATOOED, DR. GERONIMO ESPARZA AT [facility]   • SIGMOIDOSCOPY N/A 11/23/2019    AN ULCERATED PARTIALLY OBSTRUCTING MASS IN MID RECTUM, AREA TATTOOED, DR. GERONIMO ESPARZA AT [facility]   • TRANSRECTAL ULTRASOUND N/A 7/24/2019    Procedure: ULTRASOUND TRANSRECTAL;  Surgeon: Geronimo Esparza MD;  Location: [facility] ENDOSCOPY;  Service: General   • URETEROSCOPY LASER LITHOTRIPSY WITH STENT INSERTION Right 10/2019   • VAGINAL DELIVERY N/A 09/18/1998   • VENOUS ACCESS DEVICE (PORT) INSERTION Left 8/9/2019    Procedure: INSERTION VENOUS ACCESS DEVICE;  Surgeon: Sj Joseph MD;  Location: [facility] OR [facility];  Service: General   • WISDOM TOOTH EXTRACTION  1993         FAMILY HISTORY  Family History   Problem Relation Age of Onset   • Hyperlipidemia Mother    • Colon polyps Mother    • Heart disease Mother    • Hypertension Mother    • Factor V Leiden deficiency Mother    • Skin cancer Father         Squamous in 60s   • Heart disease Paternal Grandfather    • No Known Problems Son    • Cancer Paternal Uncle    • Colon cancer Paternal Uncle    • Diabetes Paternal Uncle    • Mental illness Sister         Addiction   • Colon cancer Maternal Uncle    • Malig Hyperthermia Neg Hx          SOCIAL HISTORY  Social History     Socioeconomic History   • Marital status:      Spouse name: Justus   • Number of children: 1   • Years of education:  College   • Highest education level: Not on file   Occupational History   • Occupation:      Comment: [employer]     Employer: [employer]   Tobacco Use   • Smoking status: Former Smoker     Packs/day: 1.00     Years: 24.00     Pack years: 24.00     Types: Electronic Cigarette, Cigarettes     Last attempt to quit: 2019     Years since quittin.5   • Smokeless tobacco: Never Used   Substance and Sexual Activity   • Alcohol use: Yes     Comment: Rarely   • Drug use: No   • Sexual activity: Defer         ALLERGIES  Patient has no known allergies.        REVIEW OF SYSTEMS  Review of Systems     All systems reviewed and negative except for those discussed in HPI.       PHYSICAL EXAM    I have reviewed the triage vital signs and nursing notes.    ED Triage Vitals   Temp Heart Rate Resp BP SpO2   20   97.6 °F (36.4 °C) (!) 158 16 (!) 186/136 97 %      Temp src Heart Rate Source Patient Position BP Location FiO2 (%)   20 -- -- --   Tympanic Monitor          Physical Exam  GENERAL: Mild distress  HENT: nares patent, mucous membranes normal  EYES: no scleral icterus, conjunctiva normal  CV: regular rhythm, sinus tach heart rate in 140s, no rubs murmurs or gallops noted.  No JVD no pedal edema.  Good pedal pulses  RESPIRATORY: normal effort, lungs clear to auscultation bilaterally  ABDOMEN: TTP across the lower abdomen  MUSCULOSKELETAL: no deformity  NEURO: alert, moves all extremities, follows commands  SKIN: warm, dry, no skin rash        LAB RESULTS  Recent Results (from the past 24 hour(s))   Urinalysis With Culture If Indicated - Urine, Clean Catch    Collection Time: 20 10:23 PM   Result Value Ref Range    Color, UA Red (A) Yellow, Straw    Appearance, UA Turbid (A) Clear    pH, UA 6.5 5.0 - 8.0    Specific Gravity, UA 1.025 1.005 - 1.030    Glucose, UA Negative Negative    Ketones, UA Trace (A)  Negative    Bilirubin, UA Negative Negative    Blood, UA Moderate (2+) (A) Negative    Protein, UA >=300 mg/dL (3+) (A) Negative    Leuk Esterase, UA Trace (A) Negative    Nitrite, UA Positive (A) Negative    Urobilinogen, UA 1.0 E.U./dL 0.2 - 1.0 E.U./dL   Urinalysis, Microscopic Only - Urine, Clean Catch    Collection Time: 04/24/20 10:23 PM   Result Value Ref Range    RBC, UA Too Numerous to Count (A) None Seen, 0-2 /HPF    WBC, UA Unable to determine due to loaded field (A) None Seen, 0-2 /HPF    Bacteria, UA Unable to determine due to loaded field (A) None Seen /HPF    Squamous Epithelial Cells, UA Unable to determine due to loaded field (A) None Seen, 0-2 /HPF    Hyaline Casts, UA Unable to determine due to loaded field None Seen /LPF    Methodology Automated Microscopy    Comprehensive Metabolic Panel    Collection Time: 04/24/20 11:21 PM   Result Value Ref Range    Glucose 148 (H) 65 - 99 mg/dL    BUN 9 6 - 20 mg/dL    Creatinine 0.84 0.57 - 1.00 mg/dL    Sodium 138 136 - 145 mmol/L    Potassium 3.3 (L) 3.5 - 5.2 mmol/L    Chloride 102 98 - 107 mmol/L    CO2 22.1 22.0 - 29.0 mmol/L    Calcium 8.8 8.6 - 10.5 mg/dL    Total Protein 6.9 6.0 - 8.5 g/dL    Albumin 3.60 3.50 - 5.20 g/dL    ALT (SGPT) 37 (H) 1 - 33 U/L    AST (SGOT) 25 1 - 32 U/L    Alkaline Phosphatase 111 39 - 117 U/L    Total Bilirubin 0.4 0.2 - 1.2 mg/dL    eGFR Non African Amer 75 >60 mL/min/1.73    Globulin 3.3 gm/dL    A/G Ratio 1.1 g/dL    BUN/Creatinine Ratio 10.7 7.0 - 25.0    Anion Gap 13.9 5.0 - 15.0 mmol/L   CBC Auto Differential    Collection Time: 04/24/20 11:21 PM   Result Value Ref Range    WBC 4.74 3.40 - 10.80 10*3/mm3    RBC 4.29 3.77 - 5.28 10*6/mm3    Hemoglobin 13.3 12.0 - 15.9 g/dL    Hematocrit 39.7 34.0 - 46.6 %    MCV 92.5 79.0 - 97.0 fL    MCH 31.0 26.6 - 33.0 pg    MCHC 33.5 31.5 - 35.7 g/dL    RDW 23.7 (H) 12.3 - 15.4 %    RDW-SD 78.1 (H) 37.0 - 54.0 fl    MPV 9.3 6.0 - 12.0 fL    Platelets 114 (L) 140 - 450 10*3/mm3    Manual Differential    Collection Time: 04/24/20 11:21 PM   Result Value Ref Range    Neutrophil % 61.6 42.7 - 76.0 %    Lymphocyte % 14.1 (L) 19.6 - 45.3 %    Monocyte % 21.2 (H) 5.0 - 12.0 %    Eosinophil % 3.0 0.3 - 6.2 %    Neutrophils Absolute 2.92 1.70 - 7.00 10*3/mm3    Lymphocytes Absolute 0.67 (L) 0.70 - 3.10 10*3/mm3    Monocytes Absolute 1.00 (H) 0.10 - 0.90 10*3/mm3    Eosinophils Absolute 0.14 0.00 - 0.40 10*3/mm3    RBC Morphology Normal Normal    WBC Morphology Normal Normal    Platelet Morphology Normal Normal   Lactic Acid, Plasma    Collection Time: 04/24/20 11:37 PM   Result Value Ref Range    Lactate 2.5 (C) 0.5 - 2.0 mmol/L   Lactic Acid, Reflex Timer (This will reflex a repeat order 3-3:15 hours after ordered.)    Collection Time: 04/24/20 11:37 PM   Result Value Ref Range    Hold Tube Hold for add-ons.    Basic Metabolic Panel    Collection Time: 04/25/20  3:24 AM   Result Value Ref Range    Glucose 120 (H) 65 - 99 mg/dL    BUN 9 6 - 20 mg/dL    Creatinine 0.66 0.57 - 1.00 mg/dL    Sodium 138 136 - 145 mmol/L    Potassium 3.4 (L) 3.5 - 5.2 mmol/L    Chloride 104 98 - 107 mmol/L    CO2 21.5 (L) 22.0 - 29.0 mmol/L    Calcium 8.0 (L) 8.6 - 10.5 mg/dL    eGFR Non African Amer 99 >60 mL/min/1.73    BUN/Creatinine Ratio 13.6 7.0 - 25.0    Anion Gap 12.5 5.0 - 15.0 mmol/L   CBC (No Diff)    Collection Time: 04/25/20  3:24 AM   Result Value Ref Range    WBC 4.64 3.40 - 10.80 10*3/mm3    RBC 3.75 (L) 3.77 - 5.28 10*6/mm3    Hemoglobin 11.7 (L) 12.0 - 15.9 g/dL    Hematocrit 35.0 34.0 - 46.6 %    MCV 93.3 79.0 - 97.0 fL    MCH 31.2 26.6 - 33.0 pg    MCHC 33.4 31.5 - 35.7 g/dL    RDW 23.2 (H) 12.3 - 15.4 %    RDW-SD 76.7 (H) 37.0 - 54.0 fl    MPV 9.3 6.0 - 12.0 fL    Platelets 82 (L) 140 - 450 10*3/mm3   Lactic Acid, Reflex    Collection Time: 04/25/20  3:24 AM   Result Value Ref Range    Lactate 1.4 0.5 - 2.0 mmol/L       Ordered the above labs and independently reviewed the  results.        RADIOLOGY  No Radiology Exams Resulted Within Past 24 Hours    I ordered the above noted radiological studies. Reviewed by me and discussed with radiologist.  See dictation for official radiology interpretation.      PROCEDURES    Procedures      MEDICATIONS GIVEN IN ER    Medications   sodium chloride 0.9 % flush 10 mL (has no administration in time range)   sodium chloride 0.9 % flush 10 mL (10 mL Intravenous Given 4/25/20 0321)   sodium chloride 0.9 % flush 10 mL (has no administration in time range)   nitroglycerin (NITROSTAT) SL tablet 0.4 mg (has no administration in time range)   sodium chloride 0.9 % infusion (100 mL/hr Intravenous Currently Infusing 4/25/20 0533)   acetaminophen (TYLENOL) tablet 650 mg (has no administration in time range)     Or   acetaminophen (TYLENOL) 160 MG/5ML solution 650 mg (has no administration in time range)     Or   acetaminophen (TYLENOL) suppository 650 mg (has no administration in time range)   calcium carbonate (TUMS) chewable tablet 500 mg (200 mg elemental) (has no administration in time range)   cefTRIAXone (ROCEPHIN) IVPB 1 g (has no administration in time range)   escitalopram (LEXAPRO) tablet 20 mg (has no administration in time range)   famotidine (PEPCID) tablet 20 mg (has no administration in time range)   metoprolol tartrate (LOPRESSOR) tablet 12.5 mg (has no administration in time range)   rivaroxaban (XARELTO) tablet 20 mg (has no administration in time range)   clonazePAM (KlonoPIN) tablet 1 mg (has no administration in time range)   HYDROcodone-acetaminophen (NORCO) 7.5-325 MG per tablet 1 tablet (has no administration in time range)   potassium chloride (MICRO-K) CR capsule 40 mEq (has no administration in time range)     Or   potassium chloride (KLOR-CON) packet 40 mEq (has no administration in time range)     Or   potassium chloride 10 mEq in 100 mL IVPB (has no administration in time range)   sodium chloride 0.9 % bolus 1,000 mL (0 mL  Intravenous Stopped 4/25/20 0059)   cefTRIAXone (ROCEPHIN) IVPB 1 g (0 g Intravenous Stopped 4/24/20 2355)   phenazopyridine (PYRIDIUM) tablet 200 mg (200 mg Oral Given 4/24/20 2337)   iopamidol (ISOVUE-300) 61 % injection 100 mL (85 mL Intravenous Given by Other 4/25/20 0001)   sodium chloride 0.9 % bolus 1,000 mL (0 mL Intravenous Stopped 4/25/20 0237)         PROGRESS, DATA ANALYSIS, CONSULTS, AND MEDICAL DECISION MAKING    All labs have been independently reviewed by me.  All radiology studies have been reviewed by me and discussed with radiologist dictating the report.   EKG's independently viewed and interpreted by me.  Discussion below represents my analysis of pertinent findings related to patient's condition, differential diagnosis, treatment plan and final disposition.        ED Course as of Apr 25 0713   Sat Apr 25, 2020   0045 CT reviewed with Dr. Curtis, radiologist.  Improved air collection in the pelvis.  No air in the vaginal vault at this time.  Collapsed bladder appears somewhat thick-walled and may represent cystitis.  No evidence of ureterolithiasis.  Ileostomy in place with otherwise normal-appearing bowel.    [RS]      ED Course User Index  [RS] Roland Huber MD       0130:  Discussed case with Dr. Huber supervising physician.  The patient is on chemo, with a UTI, and elevated lactic acid, dehydrated, and tachycardic.  Will consult [facility] for observation, rehydration, and further treatment    0220: Discussed with Zehra Solis, [facility], APRN.  Frank Ceballos observation status under Dr. Diallo's care at this time.        ADMISSION    Discussed treatment plan and reason for admission with pt/family and admitting physician.  Pt/family voiced understanding of the plan for admission for further testing/treatment as needed.         AS OF 07:13 VITALS:    BP - 166/93  HR - 98  TEMP - 97.9 °F (36.6 °C) (Oral)  O2 SATS - 96%        DIAGNOSIS  Final diagnoses:   On antineoplastic chemotherapy   Tachycardia    Dehydration   Lactic acidosis   Acute cystitis with hematuria         DISPOSITION  ADMISSION    Discussed treatment plan and reason for admission with pt/family and admitting physician.  Pt/family voiced understanding of the plan for admission for further testing/treatment as needed.                Garrett Montiel III, PA  04/25/20 0734

## 2020-04-25 NOTE — PLAN OF CARE
Problem: Patient Care Overview  Goal: Plan of Care Review  Outcome: Ongoing (interventions implemented as appropriate)  Flowsheets (Taken 4/25/2020 7497)  Progress: improving  Plan of Care Reviewed With: patient  Note:   Patient admitted from ED with initial complaints of urinary frequency & dysuria. Started IV rocephin for UTI. IV fluids infusing. Patient up ad dmitri. Purewick d/t urinary frequency. VSS. Patient agreeable with plan.

## 2020-04-25 NOTE — PLAN OF CARE
Problem: Patient Care Overview  Goal: Plan of Care Review  Flowsheets (Taken 4/25/2020 1914)  Progress: improving  Plan of Care Reviewed With: patient  Outcome Summary: VSS. no c/o of pain, slight discomfort during urination, continues on fluids at 100ml/hr. pt takes care of ilostomy. possible discharge in a.m pending cultures

## 2020-04-25 NOTE — ED NOTES
"Pt reports she has had urinary frequency that began tonight. Reports bladder feels full \"I go pee and it feels like I empty all the way but 2 seconds later it's full again and I have to go\" Pt reports pain with urination. No flank pain but she does have Hx of kidney stones.       Tran Santos RN  04/24/20 0923    "

## 2020-04-25 NOTE — ED PROVIDER NOTES
Brief history of present illness: 39-year-old female with urinary urgency who has a complex medical history including me know compromised secondary to chemotherapy for colorectal cancer, ileostomy, and recent CT that showed possible colovaginal fistulization.  No reported fever or significant abdominal pain.    Physical exam:   Somewhat anxious, while ill-appearing not overtly toxic.  Abdomen is soft with normal stool in ileostomy without rebound or rigidity.  No respiratory distress.  Pink warm and well-perfused but tachycardic.  No lower extremity edema or tenderness.    MDM:    Given the complex nature of this patient's medical history, a fairly profound evaluation in the emergency department tonight is indicated.  I reviewed prior CT which is been over a month ago, and given the patient's abnormal labs, immune compromised status and symptoms, I recommend that we repeat CT.    I have seen and personally evaluated this patient, discussed the case with the treating advanced practice provider, and reviewed their note. I was involved in the medical decision making during the evaluation, testing and disposition planning for this patient.     Roland Huber MD  04/24/20 8605

## 2020-04-25 NOTE — H&P
Patient Name:  Carole Weaver  YOB: 1980  MRN:  [id number]  Admit Date:  4/24/2020  Patient Care Team:  Deepkia Vela III, NP-C as PCP - General (Family Medicine)  Padmaja Ye MD as Referring Physician (Colon and Rectal Surgery)  Beatrice Lau MD as Consulting Physician (Radiation Oncology)  Dong Nguyen MD PhD as Consulting Physician (Hematology and Oncology)  Wendy Cottrell MD as Consulting Physician (Obstetrics and Gynecology)  Roland Thorpe MD as Consulting Physician (Gastroenterology)      Subjective   History Present Illness     Chief Complaint   Patient presents with   • Urinary Frequency       Ms. Weaver is a 39 y.o. former smoker with a history of rectal cancer s/p resection and diverting loop ileostomy, hypertension, thrombocytopenia, and Factor V Leiden that presents to [facility] complaining of dysuria, urinary frequency, and urgency.  She reports the symptoms have been ongoing since yesterday.  She denies fever, chills, nausea, and vomiting.  She denies suprapubic pain, flank pain, hematuria, and decreased urine.  Workup in the ED revealed K+ 3.3, lactate 2.5, and platelets 114.  Blood and urine cultures are pending.  CT of the abdomen is pending.  She was tachycardic on arrival and received a dose of IV Rocephin in the ED.      History of Present Illness  Review of Systems   Constitutional: Negative.    HENT: Negative.    Eyes: Negative.    Respiratory: Negative.    Cardiovascular: Negative.    Gastrointestinal: Negative.    Genitourinary: Positive for frequency and urgency.   Musculoskeletal: Negative.    Skin: Negative.    Allergic/Immunologic: Positive for immunocompromised state.   Neurological: Negative.    Psychiatric/Behavioral: Negative.         Personal History     Past Medical History:   Diagnosis Date   • Abnormal Pap smear of cervix 02/02/1998    JULIUS I   • Anemia in pregnancy    • Anxiety    • Cervical lymphadenitis  2012    SEEN AT [facility] ER   • Chronic diarrhea    • Colon polyps     FOLLOWED BY DR. GERONIMO YE   • Depression    • Factor V Leiden (CMS/HCC)     DX 2019   • GERD (gastroesophageal reflux disease)    • Heart murmur     ?   • Hematochezia    • Hemorrhoids    • History of chemotherapy     FOLLOWED BY DR. ALEXANDRU HUNT   • History of gestational diabetes    • History of pre-eclampsia    • History of radiation therapy     FOLLOWED BY DR. JAVON LEWIS   • History of TB skin testing 2009    TB Skin Test   • HPV in female    • IBS (irritable bowel syndrome)    • Infected insect bite of neck 2016    SEEN AT [facility]   • Irritable bowel syndrome    • Kidney stones 2007    SEEN AT [facility] ER   • Lumbar disc disease    • On anticoagulant therapy    • PIH (pregnancy induced hypertension)    • Pulmonary embolism (CMS/HCC) 2019    ADMITTED TO [facility]   • Rectal cancer (CMS/HCC) 2019    INVASIVE MODERATELY DIFFERENTIATED ADENOCARCINOMA, CHEMO AND XRT FINISHED 2019   • Right leg pain    • Right ureteral stone 10/01/2019    SEEN AT [facility] ER   • SAB (spontaneous ) 1996     A2-1 INDUCED   • Sepsis due to cellulitis (CMS/HCC) 2002    LEFT GREAT TOE, ADMITTED TO [facility]     Past Surgical History:   Procedure Laterality Date   • CHOLECYSTECTOMY N/A 10/10/2003    LAPAROSCOPIC WITH CHOLANGIOGRAM, DR. JAMEY TALAVERA AT [facility]   • COLON RESECTION N/A 2019    Procedure: laparoscopic low anterior colon resection with mobilization of splenic flexure and diverting loop ileostomy: ERAS;  Surgeon: Geronimo Ye MD;  Location: [facility];  Service: General   • COLONOSCOPY W/ POLYPECTOMY N/A 2019    15 MM TUBULOVILLOUS ADENOMA POLYP IN HEPATIC FLEXURE, 20 MMTUBULOVILLOUS ADENOMA WITH HIGH GRADE DYSPLASIA IN RECTOSIGMOID, 4 CM MASS IN MID RECTUM, PATH: INVASIVE MODERATELY DIFFERENTIATED ADENOCARCINOMA, DR. JENNIFER LI AT [facility]   • DILATATION AND  EVACUATION N/A    • ENDOSCOPY N/A 2019    LA GRADE A ESOPHAGITIS, GASTRITIS, ALL BIOPSIES BENIGN, DR. JENNIFER LI AT [facility]   • PAP SMEAR N/A 2014   • SIGMOIDOSCOPY N/A 2019    INFILTRATIVE PARTIALLY OBSTRUCTING LARGE RECTAL CANCER, AREA TATOOED, DR. PADMAJA ESPARZA AT [facility]   • SIGMOIDOSCOPY N/A 2019    AN ULCERATED PARTIALLY OBSTRUCTING MASS IN MID RECTUM, AREA TATTOOED, DR. PADMAJA ESPARZA AT [facility]   • TRANSRECTAL ULTRASOUND N/A 2019    Procedure: ULTRASOUND TRANSRECTAL;  Surgeon: Padmaja Esparza MD;  Location: [facility] ENDOSCOPY;  Service: General   • URETEROSCOPY LASER LITHOTRIPSY WITH STENT INSERTION Right 10/2019   • VAGINAL DELIVERY N/A 1998   • VENOUS ACCESS DEVICE (PORT) INSERTION Left 2019    Procedure: INSERTION VENOUS ACCESS DEVICE;  Surgeon: Sj Joseph MD;  Location: [facility] OR [facility];  Service: General   • WISDOM TOOTH EXTRACTION       Family History   Problem Relation Age of Onset   • Hyperlipidemia Mother    • Colon polyps Mother    • Heart disease Mother    • Hypertension Mother    • Factor V Leiden deficiency Mother    • Skin cancer Father         Squamous in 60s   • Heart disease Paternal Grandfather    • No Known Problems Son    • Cancer Paternal Uncle    • Colon cancer Paternal Uncle    • Diabetes Paternal Uncle    • Mental illness Sister         Addiction   • Colon cancer Maternal Uncle    • Malig Hyperthermia Neg Hx      Social History     Tobacco Use   • Smoking status: Former Smoker     Packs/day: 1.00     Years: 24.00     Pack years: 24.00     Types: Electronic Cigarette, Cigarettes     Last attempt to quit: 2019     Years since quittin.5   • Smokeless tobacco: Never Used   Substance Use Topics   • Alcohol use: Yes     Comment: Rarely   • Drug use: No     Medications Prior to Admission   Medication Sig Dispense Refill Last Dose   • clonazePAM (KlonoPIN) 1 MG tablet Take 1 tablet by mouth 3 (Three) Times a Day As Needed for  Anxiety or Seizures. 90 tablet 0 Taking   • escitalopram (LEXAPRO) 20 MG tablet Take 1 tablet by mouth Daily. 90 tablet 3 Taking   • famotidine (PEPCID) 20 MG tablet TAKE 1 TABLET BY MOUTH TWICE A DAY 60 tablet 1 Taking   • Filgrastim-aafi (NIVESTYM) 480 MCG/0.8ML solution prefilled syringe Inject 480 mcg as directed Daily. For 3 days after each chemotherapy cycle. Cycles are every 2 weeks. 4.8 mL 6 Taking   • HYDROcodone-acetaminophen (NORCO) 7.5-325 MG per tablet Take 1 tablet by mouth Every 4 (Four) Hours As Needed for Moderate Pain . 180 tablet 0 Taking   • Loratadine (CLARITIN) 10 MG capsule Take  by mouth.   Taking   • metoprolol tartrate (LOPRESSOR) 25 MG tablet TAKE 1/2 TABLET BY MOUTH EVERY 12 (TWELVE) HOURS. 60 tablet 1 Taking   • ondansetron (ZOFRAN) 8 MG tablet Take 1 tablet by mouth 3 (Three) Times a Day As Needed for Nausea or Vomiting. 60 tablet 5 Taking   • prochlorperazine (COMPAZINE) 10 MG tablet Take 1 tablet by mouth Every 6 (Six) Hours As Needed for Nausea or Vomiting. 30 tablet 2 Taking   • rivaroxaban (XARELTO) 20 MG tablet Take 1 tablet by mouth Daily. Start after finishing starter pack. (Patient taking differently: Take 20 mg by mouth Daily. PT HOLDING 48 HOURS PRIOR TO SURGERY) 30 tablet 11 Taking     Allergies:  No Known Allergies    Objective    Objective     Vital Signs  Temp:  [97.6 °F (36.4 °C)-97.9 °F (36.6 °C)] 97.9 °F (36.6 °C)  Heart Rate:  [] 98  Resp:  [16-19] 19  BP: (142-186)/() 166/93  SpO2:  [91 %-97 %] 96 %  on   ;   Device (Oxygen Therapy): room air  Body mass index is 36.59 kg/m².    Physical Exam   Constitutional: She is oriented to person, place, and time. She appears well-developed and well-nourished.   HENT:   Head: Normocephalic and atraumatic.   Eyes: Conjunctivae and EOM are normal.   Neck: Normal range of motion. Neck supple.   Cardiovascular: Normal rate, regular rhythm and normal heart sounds.   No murmur heard.  Pulmonary/Chest: Effort normal and  breath sounds normal.   Abdominal: Soft. Bowel sounds are normal.   Ileostomy bag to right mid abdomen   Musculoskeletal: Normal range of motion. She exhibits no edema.   Neurological: She is alert and oriented to person, place, and time.   Skin: Skin is warm and dry.   Psychiatric: She has a normal mood and affect. Her behavior is normal.   Nursing note and vitals reviewed.      Results Review:  I reviewed the patient's new clinical results.  I reviewed the patient's new imaging results and agree with the interpretation.  I reviewed the patient's other test results and agree with the interpretation  I personally viewed and interpreted the patient's EKG/Telemetry data  Discussed with ED provider.    Lab Results (last 24 hours)     Procedure Component Value Units Date/Time    Urinalysis With Culture If Indicated - Urine, Clean Catch [[id number]]  (Abnormal) Collected:  04/24/20 2223    Specimen:  Urine, Clean Catch Updated:  04/24/20 2240     Color, UA Red     Comment: Any Substance that causes an abnormal urine color can alter the accuracy of the chemical reactions.        Appearance, UA Turbid     pH, UA 6.5     Specific Gravity, UA 1.025     Glucose, UA Negative     Ketones, UA Trace     Bilirubin, UA Negative     Blood, UA Moderate (2+)     Protein, UA >=300 mg/dL (3+)     Leuk Esterase, UA Trace     Nitrite, UA Positive     Urobilinogen, UA 1.0 E.U./dL    Urinalysis, Microscopic Only - Urine, Clean Catch [[id number]]  (Abnormal) Collected:  04/24/20 2223    Specimen:  Urine, Clean Catch Updated:  04/24/20 2240     RBC, UA Too Numerous to Count /HPF      WBC, UA       Unable to determine due to loaded field     /HPF     Bacteria, UA       Unable to determine due to loaded field     /HPF     Squamous Epithelial Cells, UA       Unable to determine due to loaded field     /HPF     Hyaline Casts, UA       Unable to determine due to loaded field     /LPF     Methodology Automated Microscopy    Urine Culture - Urine,  Urine, Clean Catch [[id number]] Collected:  04/24/20 2223    Specimen:  Urine, Clean Catch Updated:  04/24/20 2240    CBC & Differential [[id number]] Collected:  04/24/20 2321    Specimen:  Blood Updated:  04/24/20 2341    Narrative:       The following orders were created for panel order CBC & Differential.  Procedure                               Abnormality         Status                     ---------                               -----------         ------                     CBC Auto Differential[[id number]]        Abnormal            Final result                 Please view results for these tests on the individual orders.    Comprehensive Metabolic Panel [[id number]]  (Abnormal) Collected:  04/24/20 2321    Specimen:  Blood Updated:  04/24/20 2345     Glucose 148 mg/dL      BUN 9 mg/dL      Creatinine 0.84 mg/dL      Sodium 138 mmol/L      Potassium 3.3 mmol/L      Chloride 102 mmol/L      CO2 22.1 mmol/L      Calcium 8.8 mg/dL      Total Protein 6.9 g/dL      Albumin 3.60 g/dL      ALT (SGPT) 37 U/L      AST (SGOT) 25 U/L      Alkaline Phosphatase 111 U/L      Total Bilirubin 0.4 mg/dL      eGFR Non African Amer 75 mL/min/1.73      Globulin 3.3 gm/dL      A/G Ratio 1.1 g/dL      BUN/Creatinine Ratio 10.7     Anion Gap 13.9 mmol/L     Narrative:       GFR Normal >60  Chronic Kidney Disease <60  Kidney Failure <15      CBC Auto Differential [[id number]]  (Abnormal) Collected:  04/24/20 2321    Specimen:  Blood Updated:  04/24/20 2341     WBC 4.74 10*3/mm3      RBC 4.29 10*6/mm3      Hemoglobin 13.3 g/dL      Hematocrit 39.7 %      MCV 92.5 fL      MCH 31.0 pg      MCHC 33.5 g/dL      RDW 23.7 %      RDW-SD 78.1 fl      MPV 9.3 fL      Platelets 114 10*3/mm3     Manual Differential [[id number]]  (Abnormal) Collected:  04/24/20 2321    Specimen:  Blood Updated:  04/24/20 2356     Neutrophil % 61.6 %      Lymphocyte % 14.1 %      Monocyte % 21.2 %      Eosinophil % 3.0 %      Neutrophils Absolute 2.92 10*3/mm3       Lymphocytes Absolute 0.67 10*3/mm3      Monocytes Absolute 1.00 10*3/mm3      Eosinophils Absolute 0.14 10*3/mm3      RBC Morphology Normal     WBC Morphology Normal     Platelet Morphology Normal    Lactic Acid, Plasma [[id number]]  (Abnormal) Collected:  04/24/20 2337    Specimen:  Blood Updated:  04/24/20 2358     Lactate 2.5 mmol/L     Lactic Acid, Reflex Timer (This will reflex a repeat order 3-3:15 hours after ordered.) [[id number]] Collected:  04/24/20 2337    Specimen:  Blood Updated:  04/25/20 0300     Hold Tube Hold for add-ons.     Comment: Auto resulted.       Blood Culture - Blood, Arm, Right [[id number]] Collected:  04/25/20 0025    Specimen:  Blood from Arm, Right Updated:  04/25/20 0035    Blood Culture - Blood, Arm, Left [[id number]] Collected:  04/25/20 0030    Specimen:  Blood from Arm, Left Updated:  04/25/20 0034    Basic Metabolic Panel [[id number]]  (Abnormal) Collected:  04/25/20 0324    Specimen:  Blood Updated:  04/25/20 0403     Glucose 120 mg/dL      BUN 9 mg/dL      Creatinine 0.66 mg/dL      Sodium 138 mmol/L      Potassium 3.4 mmol/L      Chloride 104 mmol/L      CO2 21.5 mmol/L      Calcium 8.0 mg/dL      eGFR Non African Amer 99 mL/min/1.73      BUN/Creatinine Ratio 13.6     Anion Gap 12.5 mmol/L     Narrative:       GFR Normal >60  Chronic Kidney Disease <60  Kidney Failure <15      CBC (No Diff) [[id number]]  (Abnormal) Collected:  04/25/20 0324    Specimen:  Blood Updated:  04/25/20 0340     WBC 4.64 10*3/mm3      RBC 3.75 10*6/mm3      Hemoglobin 11.7 g/dL      Hematocrit 35.0 %      MCV 93.3 fL      MCH 31.2 pg      MCHC 33.4 g/dL      RDW 23.2 %      RDW-SD 76.7 fl      MPV 9.3 fL      Platelets 82 10*3/mm3     Lactic Acid, Reflex [[id number]]  (Normal) Collected:  04/25/20 0324    Specimen:  Blood Updated:  04/25/20 0346     Lactate 1.4 mmol/L           Imaging Results (Last 24 Hours)     Procedure Component Value Units Date/Time    CT Abdomen Pelvis With Contrast [[id number]]  Resulted:  04/25/20 0000     Updated:  04/25/20 0000               No orders to display        Assessment/Plan     Active Hospital Problems    Diagnosis POA   • **UTI (urinary tract infection) [N39.0] Yes   • On antineoplastic chemotherapy [Z79.899] Not Applicable   • Thrombocytopenia (CMS/HCC) [D69.6] Unknown   • HTN (hypertension) [I10] Unknown   • Hypokalemia [E87.6] Yes   • Heterozygous factor V Leiden mutation (CMS/HCC) [D68.51] Yes   • Adenocarcinoma of rectum (CMS/HCC) [C20] Yes     Acute UTI  -Lactate 2.4, repeat pending  -Afebrile, WBC ok  -Continue Rocephin daily  -IVF  -Urine and blood cultures pending  -CT abdomen pending. Previous CT on 03/13/2020 suggested a fistula at the colonic anastomosis site with the vagina. She reports she is to have a follow up with colorectal surgery next month  -Replace potassium per protocol    Adenocarcinoma of Rectum  -Currently undergoing chemotherapy    Thrombocytopenia  -Most likely due to chemotherapy  -Plt 114, baseline 148-293  -If platelet count does not improve, may require hematology/oncology consult    Hypertension  -She has been hypertensive in the ED. Continue home regimen  -Monitor blood pressures    Factor V Leiden Mutation  -Continue Xarelto    -I discussed the patients findings and my recommendations with patient.    VTE Prophylaxis - Xarelto (home med).  Code Status - Full code.       JULIET Singh  [facility]  04/25/20  04:39

## 2020-04-25 NOTE — ED TRIAGE NOTES
To ER via [facility].  C/o frequent urination x 1 1/2 hours.    C/o pressure with urination.      Pt has hx of colon cancer with current chemo treatment.  Last treatment 1 1/2 weeks ago.  Pt had colon surgery in December with iliostomy placement.  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

## 2020-04-26 ENCOUNTER — READMISSION MANAGEMENT (OUTPATIENT)
Dept: CALL CENTER | Facility: HOSPITAL | Age: 40
End: 2020-04-26

## 2020-04-26 VITALS
HEIGHT: 66 IN | OXYGEN SATURATION: 95 % | WEIGHT: 226.7 LBS | DIASTOLIC BLOOD PRESSURE: 75 MMHG | TEMPERATURE: 97.7 F | BODY MASS INDEX: 36.43 KG/M2 | HEART RATE: 84 BPM | SYSTOLIC BLOOD PRESSURE: 129 MMHG | RESPIRATION RATE: 16 BRPM

## 2020-04-26 LAB
ALBUMIN SERPL-MCNC: 2.6 G/DL (ref 3.5–5.2)
ALBUMIN/GLOB SERPL: 0.9 G/DL
ALP SERPL-CCNC: 77 U/L (ref 39–117)
ALT SERPL W P-5'-P-CCNC: 27 U/L (ref 1–33)
ANION GAP SERPL CALCULATED.3IONS-SCNC: 8 MMOL/L (ref 5–15)
AST SERPL-CCNC: 22 U/L (ref 1–32)
BACTERIA SPEC AEROBE CULT: ABNORMAL
BASOPHILS # BLD MANUAL: 0.03 10*3/MM3 (ref 0–0.2)
BASOPHILS NFR BLD AUTO: 1 % (ref 0–1.5)
BILIRUB SERPL-MCNC: <0.2 MG/DL (ref 0.2–1.2)
BUN BLD-MCNC: 6 MG/DL (ref 6–20)
BUN/CREAT SERPL: 10.2 (ref 7–25)
CALCIUM SPEC-SCNC: 8.1 MG/DL (ref 8.6–10.5)
CHLORIDE SERPL-SCNC: 108 MMOL/L (ref 98–107)
CO2 SERPL-SCNC: 23 MMOL/L (ref 22–29)
CREAT BLD-MCNC: 0.59 MG/DL (ref 0.57–1)
DEPRECATED RDW RBC AUTO: 76.6 FL (ref 37–54)
EOSINOPHIL # BLD MANUAL: 0.06 10*3/MM3 (ref 0–0.4)
EOSINOPHIL NFR BLD MANUAL: 2 % (ref 0.3–6.2)
ERYTHROCYTE [DISTWIDTH] IN BLOOD BY AUTOMATED COUNT: 23 % (ref 12.3–15.4)
GFR SERPL CREATININE-BSD FRML MDRD: 113 ML/MIN/1.73
GLOBULIN UR ELPH-MCNC: 2.9 GM/DL
GLUCOSE BLD-MCNC: 102 MG/DL (ref 65–99)
HCT VFR BLD AUTO: 34.2 % (ref 34–46.6)
HGB BLD-MCNC: 11.1 G/DL (ref 12–15.9)
LYMPHOCYTES # BLD MANUAL: 0.62 10*3/MM3 (ref 0.7–3.1)
LYMPHOCYTES NFR BLD MANUAL: 16.3 % (ref 5–12)
LYMPHOCYTES NFR BLD MANUAL: 21.4 % (ref 19.6–45.3)
MAGNESIUM SERPL-MCNC: 2 MG/DL (ref 1.6–2.6)
MCH RBC QN AUTO: 30.3 PG (ref 26.6–33)
MCHC RBC AUTO-ENTMCNC: 32.5 G/DL (ref 31.5–35.7)
MCV RBC AUTO: 93.4 FL (ref 79–97)
MONOCYTES # BLD AUTO: 0.47 10*3/MM3 (ref 0.1–0.9)
NEUTROPHILS # BLD AUTO: 1.71 10*3/MM3 (ref 1.7–7)
NEUTROPHILS NFR BLD MANUAL: 59.2 % (ref 42.7–76)
PLAT MORPH BLD: NORMAL
PLATELET # BLD AUTO: 73 10*3/MM3 (ref 140–450)
PMV BLD AUTO: 9.5 FL (ref 6–12)
POTASSIUM BLD-SCNC: 4.1 MMOL/L (ref 3.5–5.2)
PROT SERPL-MCNC: 5.5 G/DL (ref 6–8.5)
RBC # BLD AUTO: 3.66 10*6/MM3 (ref 3.77–5.28)
RBC MORPH BLD: NORMAL
SODIUM BLD-SCNC: 139 MMOL/L (ref 136–145)
WBC MORPH BLD: NORMAL
WBC NRBC COR # BLD: 2.89 10*3/MM3 (ref 3.4–10.8)

## 2020-04-26 PROCEDURE — G0378 HOSPITAL OBSERVATION PER HR: HCPCS

## 2020-04-26 PROCEDURE — 99243 OFF/OP CNSLTJ NEW/EST LOW 30: CPT | Performed by: COLON & RECTAL SURGERY

## 2020-04-26 PROCEDURE — 83735 ASSAY OF MAGNESIUM: CPT | Performed by: HOSPITALIST

## 2020-04-26 PROCEDURE — 80053 COMPREHEN METABOLIC PANEL: CPT | Performed by: HOSPITALIST

## 2020-04-26 PROCEDURE — 96361 HYDRATE IV INFUSION ADD-ON: CPT

## 2020-04-26 PROCEDURE — 85007 BL SMEAR W/DIFF WBC COUNT: CPT | Performed by: HOSPITALIST

## 2020-04-26 PROCEDURE — 85025 COMPLETE CBC W/AUTO DIFF WBC: CPT | Performed by: HOSPITALIST

## 2020-04-26 RX ORDER — CEFDINIR 300 MG/1
300 CAPSULE ORAL 2 TIMES DAILY
Qty: 12 CAPSULE | Refills: 0 | Status: SHIPPED | OUTPATIENT
Start: 2020-04-26 | End: 2020-05-02

## 2020-04-26 RX ADMIN — METOPROLOL TARTRATE 12.5 MG: 25 TABLET ORAL at 08:19

## 2020-04-26 RX ADMIN — RIVAROXABAN 20 MG: 20 TABLET, FILM COATED ORAL at 08:19

## 2020-04-26 RX ADMIN — FAMOTIDINE 20 MG: 20 TABLET, FILM COATED ORAL at 08:19

## 2020-04-26 RX ADMIN — ESCITALOPRAM 20 MG: 20 TABLET, FILM COATED ORAL at 08:19

## 2020-04-26 NOTE — CONSULTS
Carole Weaver is a 39 y.o. female who is seen as a consult at the request of Lito Wu MD for possible colovaginal fistula    HPI:  Patient states she started having urinary frequency.  She was getting up every couple of minutes feeling like she needed to urinate.  She came to the emergency room.  She was diagnosed with a urinary tract infection.  While here I was asked to see patient for possible colovaginal fistula  Patient states that her ostomy output is always thin.  She is not taking any Imodium.  She states she likes a looser because it is easier to clean.    Past Medical History:   Diagnosis Date   • Abnormal Pap smear of cervix 1998    JULIUS I   • Anemia in pregnancy    • Anxiety    • Cervical lymphadenitis 2012    SEEN AT [facility] ER   • Chronic diarrhea    • Colon polyps     FOLLOWED BY DR. GERONIMO ESPARZA   • Depression    • Factor V Leiden (CMS/Formerly [facility])     DX 2019   • GERD (gastroesophageal reflux disease)    • Heart murmur     ?   • Hematochezia    • Hemorrhoids    • History of chemotherapy     FOLLOWED BY DR. ALEXANDRU HUNT   • History of gestational diabetes    • History of pre-eclampsia    • History of radiation therapy     FOLLOWED BY DR. JAVON LEWIS   • History of TB skin testing 2009    TB Skin Test   • HPV in female    • IBS (irritable bowel syndrome)    • Infected insect bite of neck 2016    SEEN AT [facility]   • Irritable bowel syndrome    • Kidney stones 2007    SEEN AT [facility] ER   • Lumbar disc disease    • On anticoagulant therapy    • PIH (pregnancy induced hypertension)    • Pulmonary embolism (CMS/Formerly [facility]) 2019    ADMITTED TO [facility]   • Rectal cancer (CMS/Formerly [facility]) 2019    INVASIVE MODERATELY DIFFERENTIATED ADENOCARCINOMA, CHEMO AND XRT FINISHED 2019   • Right leg pain    • Right ureteral stone 10/01/2019    SEEN AT [facility] ER   • SAB (spontaneous ) 1996     A2-1 INDUCED   • Sepsis due to cellulitis (CMS/Formerly [facility]) 2002     LEFT GREAT TOE, ADMITTED TO [facility]       Past Surgical History:   Procedure Laterality Date   • CHOLECYSTECTOMY N/A 10/10/2003    LAPAROSCOPIC WITH CHOLANGIOGRAM, DR. JAMEY TALAVERA AT [facility]   • COLON RESECTION N/A 12/2/2019    Procedure: laparoscopic low anterior colon resection with mobilization of splenic flexure and diverting loop ileostomy: ERAS;  Surgeon: Geronimo Ye MD;  Location: [facility] OR;  Service: General   • COLONOSCOPY W/ POLYPECTOMY N/A 07/08/2019    15 MM TUBULOVILLOUS ADENOMA POLYP IN HEPATIC FLEXURE, 20 MMTUBULOVILLOUS ADENOMA WITH HIGH GRADE DYSPLASIA IN RECTOSIGMOID, 4 CM MASS IN MID RECTUM, PATH: INVASIVE MODERATELY DIFFERENTIATED ADENOCARCINOMA, DR. JENNIFER LI AT [facility]   • DILATATION AND EVACUATION N/A 2009   • ENDOSCOPY N/A 07/08/2019    LA GRADE A ESOPHAGITIS, GASTRITIS, ALL BIOPSIES BENIGN, DR. JENNIFER LI AT [facility]   • PAP SMEAR N/A 01/24/2014   • SIGMOIDOSCOPY N/A 7/24/2019    INFILTRATIVE PARTIALLY OBSTRUCTING LARGE RECTAL CANCER, AREA TATOOED, DR. GERONIMO YE AT [facility]   • SIGMOIDOSCOPY N/A 11/23/2019    AN ULCERATED PARTIALLY OBSTRUCTING MASS IN MID RECTUM, AREA TATTOOED, DR. GERONIMO YE AT [facility]   • TRANSRECTAL ULTRASOUND N/A 7/24/2019    Procedure: ULTRASOUND TRANSRECTAL;  Surgeon: Geronimo Ye MD;  Location: [facility] ENDOSCOPY;  Service: General   • URETEROSCOPY LASER LITHOTRIPSY WITH STENT INSERTION Right 10/2019   • VAGINAL DELIVERY N/A 09/18/1998   • VENOUS ACCESS DEVICE (PORT) INSERTION Left 8/9/2019    Procedure: INSERTION VENOUS ACCESS DEVICE;  Surgeon: Sj Joseph MD;  Location: [facility] OR OSC;  Service: General   • WISDOM TOOTH EXTRACTION  1993       Social History:   reports that she quit smoking about 7 months ago. Her smoking use included electronic cigarette and cigarettes. She has a 24.00 pack-year smoking history. She has never used smokeless tobacco. She reports that she drinks alcohol. She reports that she does not use  drugs.      Marriage status:     Family History   Problem Relation Age of Onset   • Hyperlipidemia Mother    • Colon polyps Mother    • Heart disease Mother    • Hypertension Mother    • Factor V Leiden deficiency Mother    • Skin cancer Father         Squamous in 60s   • Heart disease Paternal Grandfather    • No Known Problems Son    • Cancer Paternal Uncle    • Colon cancer Paternal Uncle    • Diabetes Paternal Uncle    • Mental illness Sister         Addiction   • Colon cancer Maternal Uncle    • Malig Hyperthermia Neg Hx        No current facility-administered medications on file prior to encounter.      Current Outpatient Medications on File Prior to Encounter   Medication Sig Dispense Refill   • clonazePAM (KlonoPIN) 1 MG tablet Take 1 tablet by mouth 3 (Three) Times a Day As Needed for Anxiety or Seizures. 90 tablet 0   • escitalopram (LEXAPRO) 20 MG tablet Take 1 tablet by mouth Daily. 90 tablet 3   • famotidine (PEPCID) 20 MG tablet TAKE 1 TABLET BY MOUTH TWICE A DAY 60 tablet 1   • Filgrastim-aafi (NIVESTYM) 480 MCG/0.8ML solution prefilled syringe Inject 480 mcg as directed Daily. For 3 days after each chemotherapy cycle. Cycles are every 2 weeks. 4.8 mL 6   • HYDROcodone-acetaminophen (NORCO) 7.5-325 MG per tablet Take 1 tablet by mouth Every 4 (Four) Hours As Needed for Moderate Pain . 180 tablet 0   • Loratadine (CLARITIN) 10 MG capsule Take  by mouth.     • metoprolol tartrate (LOPRESSOR) 25 MG tablet TAKE 1/2 TABLET BY MOUTH EVERY 12 (TWELVE) HOURS. 60 tablet 1   • ondansetron (ZOFRAN) 8 MG tablet Take 1 tablet by mouth 3 (Three) Times a Day As Needed for Nausea or Vomiting. 60 tablet 5   • prochlorperazine (COMPAZINE) 10 MG tablet Take 1 tablet by mouth Every 6 (Six) Hours As Needed for Nausea or Vomiting. 30 tablet 2   • rivaroxaban (XARELTO) 20 MG tablet Take 1 tablet by mouth Daily. Start after finishing starter pack. (Patient taking differently: Take 20 mg by mouth Daily. PT HOLDING 48  HOURS PRIOR TO SURGERY) 30 tablet 11       Current Facility-Administered Medications:   •  acetaminophen (TYLENOL) tablet 650 mg, 650 mg, Oral, Q4H PRN **OR** acetaminophen (TYLENOL) 160 MG/5ML solution 650 mg, 650 mg, Oral, Q4H PRN **OR** acetaminophen (TYLENOL) suppository 650 mg, 650 mg, Rectal, Q4H PRN, Sheba Solis APRN  •  calcium carbonate (TUMS) chewable tablet 500 mg (200 mg elemental), 2 tablet, Oral, BID PRN, Sheba Solis APRN  •  cefTRIAXone (ROCEPHIN) IVPB 1 g, 1 g, Intravenous, Q24H, Sheba Solis APRN, Stopped at 04/25/20 2330  •  clonazePAM (KlonoPIN) tablet 1 mg, 1 mg, Oral, TID PRN, Lito Wu MD  •  escitalopram (LEXAPRO) tablet 20 mg, 20 mg, Oral, Daily, Sheba Solis APRN, 20 mg at 04/26/20 0819  •  famotidine (PEPCID) tablet 20 mg, 20 mg, Oral, BID, Sheba Solis APRN, 20 mg at 04/26/20 0819  •  HYDROcodone-acetaminophen (NORCO) 7.5-325 MG per tablet 1 tablet, 1 tablet, Oral, Q6H PRN, Lito Wu MD  •  metoprolol tartrate (LOPRESSOR) tablet 12.5 mg, 12.5 mg, Oral, Q12H, Sheba Solis APRN, 12.5 mg at 04/26/20 0819  •  nitroglycerin (NITROSTAT) SL tablet 0.4 mg, 0.4 mg, Sublingual, Q5 Min PRN, Sheba Solis APRN  •  potassium chloride (MICRO-K) CR capsule 40 mEq, 40 mEq, Oral, PRN, 40 mEq at 04/25/20 1859 **OR** potassium chloride (KLOR-CON) packet 40 mEq, 40 mEq, Oral, PRN **OR** potassium chloride 10 mEq in 100 mL IVPB, 10 mEq, Intravenous, Q1H PRN, Sheba Solis APRN  •  rivaroxaban (XARELTO) tablet 20 mg, 20 mg, Oral, Daily, Sheba Solis APRN, 20 mg at 04/26/20 0819  •  [COMPLETED] Insert peripheral IV, , , Once **AND** sodium chloride 0.9 % flush 10 mL, 10 mL, Intravenous, PRN, Garrett Montiel III, PA  •  sodium chloride 0.9 % flush 10 mL, 10 mL, Intravenous, Q12H, Sheba Solis APRN, 10 mL at 04/25/20 2030  •  sodium chloride 0.9 % flush 10 mL, 10 mL, Intravenous, PRN, Irma,  JULIET Rodas  •  sodium chloride 0.9 % infusion, 100 mL/hr, Intravenous, Continuous, Irma JULIET Rodas, Last Rate: 100 mL/hr at 04/26/20 0413, 100 mL/hr at 04/26/20 0413    Allergy  Patient has no known allergies.    Review of Systems   Constitution: Negative for decreased appetite and weight gain.   HENT: Negative for congestion, hearing loss and hoarse voice.    Eyes: Negative for blurred vision, discharge and visual disturbance.   Cardiovascular: Negative for chest pain, cyanosis and leg swelling.   Respiratory: Negative for cough, shortness of breath, sleep disturbances due to breathing and snoring.    Endocrine: Negative for cold intolerance and heat intolerance.   Hematologic/Lymphatic: Does not bruise/bleed easily.   Skin: Negative for itching, poor wound healing and skin cancer.   Musculoskeletal: Negative for arthritis, back pain, joint pain and joint swelling.   Gastrointestinal: Negative for abdominal pain, change in bowel habit, bowel incontinence and constipation.   Genitourinary: Positive for dysuria. Negative for bladder incontinence and hematuria.   Neurological: Negative for brief paralysis, excessive daytime sleepiness, dizziness, focal weakness, headaches, light-headedness and weakness.   Psychiatric/Behavioral: Negative for altered mental status and hallucinations. The patient does not have insomnia.    Allergic/Immunologic: Negative for HIV exposure and persistent infections.       Vitals:    04/26/20 0725   BP: 133/78   Pulse: 77   Resp: 16   Temp: 97.8 °F (36.6 °C)   SpO2: 95%     Body mass index is 36.59 kg/m².    Physical Exam   Constitutional: She is oriented to person, place, and time. She appears well-developed and well-nourished. No distress.   HENT:   Head: Normocephalic and atraumatic.   Nose: Nose normal.   Mouth/Throat: Oropharynx is clear and moist.   Eyes: Pupils are equal, round, and reactive to light. Conjunctivae and EOM are normal.   Neck: Normal range of motion.  No tracheal deviation present.   Pulmonary/Chest: Effort normal and breath sounds normal. No respiratory distress.   Abdominal: Soft. Bowel sounds are normal. She exhibits no distension.   Right lower quadrant ileostomy   Musculoskeletal: Normal range of motion. She exhibits no edema or deformity.   Neurological: She is alert and oriented to person, place, and time. No cranial nerve deficit. Coordination and gait normal.   Skin: Skin is warm and dry.   Psychiatric: She has a normal mood and affect. Her behavior is normal. Judgment normal.       Review of Medical Record:  I reviewed CT images and report: Small fluid collection presacrally.    Assessment: Urinary tract infection  Presacral fluid collection possible colovaginal fistula  Loose ileostomy output  Plan:  Patient being treated with antibiotics for UTI  We will evaluate after chemotherapy with a Gastrografin enema the anastomosis which will show if there is a colovaginal fistula  Reiterated with patient need for Imodium to keep up with loose ostomy output

## 2020-04-26 NOTE — DISCHARGE SUMMARY
Patient Name: Carole Weaver  : 1980  MRN: [id number]    Date of Admission: 2020  Date of Discharge:  2020  Primary Care Physician: Deepika eVla III, KRISTINEC      Chief Complaint:   Urinary Frequency      Discharge Diagnoses     Active Hospital Problems    Diagnosis  POA   • **UTI (urinary tract infection) [N39.0]  Yes   • On antineoplastic chemotherapy [Z79.899]  Not Applicable   • Thrombocytopenia (CMS/HCC) [D69.6]  Yes   • HTN (hypertension) [I10]  Yes   • Ileostomy present (CMS/HCC) [Z93.2]  Not Applicable   • Chronic anticoagulation [Z79.01]  Not Applicable   • Hypokalemia [E87.6]  Yes   • Heterozygous factor V Leiden mutation (CMS/HCC) [D68.51]  Yes   • Adenocarcinoma of rectum (CMS/HCC) [C20]  Yes   • Anxiety [F41.9]  Yes      Resolved Hospital Problems   No resolved problems to display.        Hospital Course     Very pleasant 38yo with h/o rectal adenoCA and diverting loop ileostomy who was admitted with acute UTI after presenting to ER with c/o LUTS. She was started on IV Rocephin and cultures were sent. CT A&P showed decrease in size of known pelvic fluid collection. Dr. Ye (pt's colorectal surgeon) is aware and does not feel it is a contributing factor to this infection. Pt has responded very well to IV Rocephin and urine culture grew pan-sensitive E.coli. She is afebrile and sinus tachycardia has resolved with IVFs. Blood cultures show no growth to date. K+ and Mg++ are wnl today. Hgb is stable. Platelets low but not a new problem for her. They are greater than 50 so would continue Xarelto for her hypercoagulable state (Factor V Leyden). She will go home today on oral Cefdinir to complete 7d course.    Day of Discharge     Feeling good today. Eager to go home    Physical Exam:  Temp:  [97.8 °F (36.6 °C)-98.2 °F (36.8 °C)] 97.8 °F (36.6 °C)  Heart Rate:  [72-78] 77  Resp:  [16-19] 16  BP: (121-141)/(78-86) 133/78  Body mass index is 36.59 kg/m².  Physical Exam  A&O x3,  NAD, very pleasant  HEENT: wnl  Neck: supple  Lungs: CTAB  CV: RRR  Abd: soft NTND, +BS, ostomy on right  Extr: WWP, BCR<3sec, no edema  Neuro: no focal deficits    Consultants     Consult Orders (all) (From admission, onward)     Start     Ordered    04/25/20 1103  Inpatient Colorectal Surgery Consult  Once     Specialty:  Colon and Rectal Surgery  Provider:  Padmaja Ye MD    04/25/20 1102    04/25/20 0049  [facility] (on-call MD unless specified) Details  Once     Specialty:  Hospitalist  Provider:  (Not yet assigned)    04/25/20 0048              Procedures     Imaging Results (All)     Procedure Component Value Units Date/Time    CT Abdomen Pelvis With Contrast [[id number]] Collected:  04/25/20 0022     Updated:  04/25/20 1419    Narrative:       CT ABDOMEN PELVIS W CONTRAST-     CLINICAL HISTORY: 39 old female with history of rectal carcinoma. Status  post low anterior resection and diverting ileostomy. Abdominal pain.     TECHNIQUE: Spiral CT images were obtained through the abdomen and pelvis  with IV contrast only and were reconstructed in 3 mm thick axial slices.     Radiation dose reduction techniques were utilized, including automated  exposure control and exposure modulation based on body size.     COMPARISON: CT scan of the abdomen and pelvis dated 03/13/2020.     FINDINGS: The liver, spleen, pancreas, kidneys, and adrenal glands  appear within normal limits. The gallbladder is absent. A diverting  ileostomy is present in the right lower quadrant and is unchanged. There  is no bowel dilatation. A small collection of gas in the presacral  region has diminished in overall size somewhat in the interval. It  measures 2.9 x 1.5 cm in diameter. The previous CT scan showed fluid in  this region, as well. The fluid has resolved. Air within the vagina  shown on the previous CT scan has also resolved.. A bladder is  completely decompressed, and therefore poorly evaluated. However, it  does appear somewhat thick  walled and shows some mild mucosal  hyperenhancement. Cystitis cannot be excluded. No air is present within  the lumen of the urinary bladder.       Impression:       Postoperative changes as described. An abnormal collection  of extraluminal air and fluid in the presacral region has diminished in  overall size since the preceding CT dated 03/13/2020. The urinary  bladder is not well evaluated due to poor distention but appears  somewhat thick walled and shows mild hyperenhancement suspicious for  cystitis. Correlation with clinical findings is recommended.     This report was finalized on 4/25/2020 12:36 AM by Dr. Eusebio Curtis M.D.             Pertinent Labs     Results from last 7 days   Lab Units 04/26/20 0307 04/25/20 0324 04/24/20  2321   WBC 10*3/mm3 2.89* 4.64 4.74   HEMOGLOBIN g/dL 11.1* 11.7* 13.3   PLATELETS 10*3/mm3 73* 82* 114*     Results from last 7 days   Lab Units 04/26/20 0307 04/25/20 0324 04/24/20  2321   SODIUM mmol/L 139 138 138   POTASSIUM mmol/L 4.1 3.4* 3.3*   CHLORIDE mmol/L 108* 104 102   CO2 mmol/L 23.0 21.5* 22.1   BUN mg/dL 6 9 9   CREATININE mg/dL 0.59 0.66 0.84   GLUCOSE mg/dL 102* 120* 148*   Estimated Creatinine Clearance: 153.7 mL/min (by C-G formula based on SCr of 0.59 mg/dL).  Results from last 7 days   Lab Units 04/26/20 0307 04/24/20  2321   ALBUMIN g/dL 2.60* 3.60   BILIRUBIN mg/dL <0.2* 0.4   ALK PHOS U/L 77 111   AST (SGOT) U/L 22 25   ALT (SGPT) U/L 27 37*     Results from last 7 days   Lab Units 04/26/20 0307 04/25/20 0324 04/24/20  2321   CALCIUM mg/dL 8.1* 8.0* 8.8   ALBUMIN g/dL 2.60*  --  3.60   MAGNESIUM mg/dL 2.0 1.7  --                Invalid input(s): LDLCALC  Results from last 7 days   Lab Units 04/25/20  0030 04/25/20  0025 04/24/20  2223   BLOODCX  No growth at 24 hours No growth at 24 hours  --    URINECX   --   --  50,000 CFU/mL Escherichia coli*       Test Results Pending at Discharge      Order Current Status    Blood Culture - Blood, Arm, Left  Preliminary result    Blood Culture - Blood, Arm, Right Preliminary result          Discharge Details        Discharge Medications      New Medications      Instructions Start Date   cefdinir 300 MG capsule  Commonly known as:  OMNICEF   300 mg, Oral, 2 Times Daily         Changes to Medications      Instructions Start Date   rivaroxaban 20 MG tablet  Commonly known as:  Xarelto  What changed:  additional instructions   20 mg, Oral, Daily         Continue These Medications      Instructions Start Date   Claritin 10 MG capsule  Generic drug:  Loratadine   Oral      clonazePAM 1 MG tablet  Commonly known as:  KlonoPIN   1 mg, Oral, 3 Times Daily PRN      escitalopram 20 MG tablet  Commonly known as:  LEXAPRO   20 mg, Oral, Daily      famotidine 20 MG tablet  Commonly known as:  PEPCID   TAKE 1 TABLET BY MOUTH TWICE A DAY      Filgrastim-aafi 480 MCG/0.8ML solution prefilled syringe  Commonly known as:  Nivestym   480 mcg, Injection, Daily, For 3 days after each chemotherapy cycle. Cycles are every 2 weeks.      HYDROcodone-acetaminophen 7.5-325 MG per tablet  Commonly known as:  Norco   1 tablet, Oral, Every 4 Hours PRN      metoprolol tartrate 25 MG tablet  Commonly known as:  LOPRESSOR   TAKE 1/2 TABLET BY MOUTH EVERY 12 (TWELVE) HOURS.      ondansetron 8 MG tablet  Commonly known as:  ZOFRAN   8 mg, Oral, 3 Times Daily PRN      prochlorperazine 10 MG tablet  Commonly known as:  COMPAZINE   10 mg, Oral, Every 6 Hours PRN             No Known Allergies      Discharge Disposition:  Home or Self Care    Discharge Diet:  Diet Order   Procedures   • Diet Regular       Discharge Activity:   as tolerated    CODE STATUS:    Code Status and Medical Interventions:   Ordered at: 04/25/20 0242     Code Status:    CPR     Medical Interventions (Level of Support Prior to Arrest):    Full       Future Appointments   Date Time Provider Department Center   4/29/2020  7:45 AM INFU [facility] PORT CHAIR  INFUS [facility]   4/29/2020  8:20  AM Gaby Cruz APRN [facility]   4/29/2020  8:45 AM CHAIR 05 CBC KRE - [facility] INFUS [facility]   5/5/2020  8:40 AM Padmaja Ye MD [facility] CRS  [facility] None   5/13/2020  7:45 AM INFU CBC [facility] PORT CHAIR BH INFUS [facility]   5/13/2020  8:20 AM Dong Nguyen MD PhD [facility] CBC [facility]   5/13/2020  8:45 AM CHAIR 03 CBC KRE - LG [facility] INFUS K[facility]   5/15/2020  2:30 PM Deepika Vela III, KRISTINEC [facility] PC MDEST None     Additional Instructions for the Follow-ups that You Need to Schedule     Discharge Follow-up with PCP   As directed       Currently Documented PCP:    Deepika Vela III, TRINY    PCP Phone Number:    [phone]     Follow Up Details:  Dr. Vela (PCP) in 1 week           Follow-up Information     Deepika Vela III, NP-C .    Specialty:  Family Medicine  Why:  Dr. Vela (PCP) in 1 week  Contact information:  [address] [place] [address]  [phone]                   Additional Instructions for the Follow-ups that You Need to Schedule     Discharge Follow-up with PCP   As directed       Currently Documented PCP:    Deepika Vela III, NP-C    PCP Phone Number:    [phone]     Follow Up Details:  Dr. Vela (PCP) in 1 week           Time Spent on Discharge:  Greater than 30 minutes      Pablo Mayfield MD  [facility]  04/26/20  11:10 AM

## 2020-04-26 NOTE — PLAN OF CARE
Problem: Patient Care Overview  Goal: Plan of Care Review  Outcome: Ongoing (interventions implemented as appropriate)  Flowsheets  Taken 4/25/2020 1914 by Cristina Rojo RN  Progress: improving  Taken 4/25/2020 2030 by Lurdes De La Cruz RN  Plan of Care Reviewed With: patient  Taken 4/26/2020 0418 by Lurdes De La Cruz RN  Outcome Summary: VSS.  No c/o pain.  Continues IV antibiotics and IV fluids.  Possible DC depending on cultures.  Will continue to monitor.  Goal: Individualization and Mutuality  Outcome: Ongoing (interventions implemented as appropriate)  Goal: Discharge Needs Assessment  Outcome: Ongoing (interventions implemented as appropriate)     Problem: Oncology Care (Adult)  Goal: Signs and Symptoms of Listed Potential Problems Will be Absent, Minimized or Managed (Oncology Care)  Outcome: Ongoing (interventions implemented as appropriate)     Problem: Anxiety (Adult)  Goal: Identify Related Risk Factors and Signs and Symptoms  Outcome: Ongoing (interventions implemented as appropriate)

## 2020-04-26 NOTE — OUTREACH NOTE
Prep Survey      Responses   [facility] facility patient discharged from?  [place]   Is LACE score < 7 ?  Yes   Eligibility  [facility]   Date of Admission  04/24/20   Date of Discharge  04/26/20   Discharge Disposition  Home or Self Care   Discharge diagnosis  Acute UTI, on chemo, thrombocytopenia   COVID-19 Test Status  Not tested   Does the patient have one of the following disease processes/diagnoses(primary or secondary)?  Other   Does the patient have Home health ordered?  No   Is there a DME ordered?  No   Prep survey completed?  Yes          Nikole Huber RN

## 2020-04-27 ENCOUNTER — TRANSITIONAL CARE MANAGEMENT TELEPHONE ENCOUNTER (OUTPATIENT)
Dept: CALL CENTER | Facility: HOSPITAL | Age: 40
End: 2020-04-27

## 2020-04-27 ENCOUNTER — TELEPHONE (OUTPATIENT)
Dept: ONCOLOGY | Facility: CLINIC | Age: 40
End: 2020-04-27

## 2020-04-27 NOTE — TELEPHONE ENCOUNTER
I returned the patient's call to triage. She is inquiring about treatment this week and if we will need to hold due to her recent hospitalization for UTI. She is currently scheduled to see Daksha CHUNG on 4/29/2020. I explained that we will need to keep this appointment and determine further treatment at this time. She reports feeling better and very much wants to keep on course with her treatments.

## 2020-04-27 NOTE — OUTREACH NOTE
Call Center TCM Note      Responses   [facility] patient discharged from?  [facility]   COVID-19 Test Status  Not tested   Does the patient have one of the following disease processes/diagnoses(primary or secondary)?  Other   TCM attempt successful?  Yes   Call start time  1133   Call end time  1140   Meds reviewed with patient/caregiver?  Yes   Is the patient having any side effects they believe may be caused by any medication additions or changes?  No   Does the patient have all medications ordered at discharge?  Yes   Medication comments  has picked up antibiotic and is taking them   Comments regarding appointments  she has so many appts scheduled, would like to wait to see Dr Vela at next scheduled appt 5/15   Does the patient have a primary care provider?   Yes   Does the patient have an appointment with their PCP within 7 days of discharge?  Greater than 7 days   Comments regarding PCP  has appt on 5/15   Nursing Interventions  Verified appointment date/time/provider, Educated patient on importance of making appointment   Has the patient kept scheduled appointments due by today?  Yes   Comments  has appt with oncologist this week, surgeon saw her before she left facility, feels like she is seeing enough providers right now   Did the patient receive a copy of their discharge instructions?  Yes   Nursing interventions  Reviewed instructions with patient   What is the patient's perception of their health status since discharge?  Improving   Is the patient/caregiver able to teach back signs and symptoms related to disease process for when to call PCP?  Yes   Is the patient/caregiver able to teach back signs and symptoms related to disease process for when to call 911?  Yes   Is the patient/caregiver able to teach back the hierarchy of who to call/visit for symptoms/problems? PCP, Specialist, Home health nurse, Urgent Care, ED, 911  Yes   Additional teach back comments  she is doing well, voice sounds  strong and pleasant. Discussed PCP appt options face to face, video, phone.    TCM call completed?  Yes   Wrap up additional comments  prefers not to schedule PCP appt right now, sees oncology this week.           Meggan Pathak RN    4/27/2020, 11:42

## 2020-04-29 ENCOUNTER — OFFICE VISIT (OUTPATIENT)
Dept: ONCOLOGY | Facility: CLINIC | Age: 40
End: 2020-04-29

## 2020-04-29 ENCOUNTER — INFUSION (OUTPATIENT)
Dept: ONCOLOGY | Facility: HOSPITAL | Age: 40
End: 2020-04-29

## 2020-04-29 VITALS
TEMPERATURE: 98 F | RESPIRATION RATE: 16 BRPM | DIASTOLIC BLOOD PRESSURE: 86 MMHG | WEIGHT: 218.7 LBS | OXYGEN SATURATION: 94 % | SYSTOLIC BLOOD PRESSURE: 128 MMHG | BODY MASS INDEX: 36.44 KG/M2 | HEIGHT: 65 IN | HEART RATE: 85 BPM

## 2020-04-29 DIAGNOSIS — C20 ADENOCARCINOMA OF RECTUM (HCC): ICD-10-CM

## 2020-04-29 DIAGNOSIS — E87.6 HYPOKALEMIA: ICD-10-CM

## 2020-04-29 DIAGNOSIS — Z79.01 CHRONIC ANTICOAGULATION: Primary | Chronic | ICD-10-CM

## 2020-04-29 DIAGNOSIS — E83.42 HYPOMAGNESEMIA: ICD-10-CM

## 2020-04-29 DIAGNOSIS — D70.1 CHEMOTHERAPY-INDUCED NEUTROPENIA (HCC): ICD-10-CM

## 2020-04-29 DIAGNOSIS — C20 ADENOCARCINOMA OF RECTUM (HCC): Primary | ICD-10-CM

## 2020-04-29 DIAGNOSIS — H57.89 SCLERAL INJECTION: ICD-10-CM

## 2020-04-29 DIAGNOSIS — T45.1X5A CHEMOTHERAPY-INDUCED NEUTROPENIA (HCC): ICD-10-CM

## 2020-04-29 DIAGNOSIS — H04.203 BILATERAL EPIPHORA: ICD-10-CM

## 2020-04-29 LAB
ALBUMIN SERPL-MCNC: 3.4 G/DL (ref 3.5–5.2)
ALBUMIN/GLOB SERPL: 1.1 G/DL (ref 1.1–2.4)
ALP SERPL-CCNC: 95 U/L (ref 38–116)
ALT SERPL W P-5'-P-CCNC: 29 U/L (ref 0–33)
ANION GAP SERPL CALCULATED.3IONS-SCNC: 13.7 MMOL/L (ref 5–15)
AST SERPL-CCNC: 26 U/L (ref 0–32)
BASOPHILS # BLD AUTO: 0.02 10*3/MM3 (ref 0–0.2)
BASOPHILS NFR BLD AUTO: 0.6 % (ref 0–1.5)
BILIRUB SERPL-MCNC: 0.2 MG/DL (ref 0.2–1.2)
BUN BLD-MCNC: 8 MG/DL (ref 6–20)
BUN/CREAT SERPL: 10.5 (ref 7.3–30)
CALCIUM SPEC-SCNC: 9.2 MG/DL (ref 8.5–10.2)
CHLORIDE SERPL-SCNC: 102 MMOL/L (ref 98–107)
CO2 SERPL-SCNC: 24.3 MMOL/L (ref 22–29)
CREAT BLD-MCNC: 0.76 MG/DL (ref 0.6–1.1)
DEPRECATED RDW RBC AUTO: 82.1 FL (ref 37–54)
EOSINOPHIL # BLD AUTO: 0.07 10*3/MM3 (ref 0–0.4)
EOSINOPHIL NFR BLD AUTO: 2.3 % (ref 0.3–6.2)
ERYTHROCYTE [DISTWIDTH] IN BLOOD BY AUTOMATED COUNT: 23.7 % (ref 12.3–15.4)
GFR SERPL CREATININE-BSD FRML MDRD: 84 ML/MIN/1.73
GLOBULIN UR ELPH-MCNC: 3.1 GM/DL (ref 1.8–3.5)
GLUCOSE BLD-MCNC: 107 MG/DL (ref 74–124)
HCT VFR BLD AUTO: 38.5 % (ref 34–46.6)
HGB BLD-MCNC: 12.7 G/DL (ref 12–15.9)
IMM GRANULOCYTES # BLD AUTO: 0.01 10*3/MM3 (ref 0–0.05)
IMM GRANULOCYTES NFR BLD AUTO: 0.3 % (ref 0–0.5)
LYMPHOCYTES # BLD AUTO: 0.88 10*3/MM3 (ref 0.7–3.1)
LYMPHOCYTES NFR BLD AUTO: 28.6 % (ref 19.6–45.3)
MCH RBC QN AUTO: 31.7 PG (ref 26.6–33)
MCHC RBC AUTO-ENTMCNC: 33 G/DL (ref 31.5–35.7)
MCV RBC AUTO: 96 FL (ref 79–97)
MONOCYTES # BLD AUTO: 0.34 10*3/MM3 (ref 0.1–0.9)
MONOCYTES NFR BLD AUTO: 11 % (ref 5–12)
NEUTROPHILS # BLD AUTO: 1.76 10*3/MM3 (ref 1.7–7)
NEUTROPHILS NFR BLD AUTO: 57.2 % (ref 42.7–76)
NRBC BLD AUTO-RTO: 0 /100 WBC (ref 0–0.2)
PLATELET # BLD AUTO: 117 10*3/MM3 (ref 140–450)
PMV BLD AUTO: 9.1 FL (ref 6–12)
POTASSIUM BLD-SCNC: 3.2 MMOL/L (ref 3.5–4.7)
PROT SERPL-MCNC: 6.5 G/DL (ref 6.3–8)
RBC # BLD AUTO: 4.01 10*6/MM3 (ref 3.77–5.28)
SODIUM BLD-SCNC: 140 MMOL/L (ref 134–145)
WBC NRBC COR # BLD: 3.08 10*3/MM3 (ref 3.4–10.8)

## 2020-04-29 PROCEDURE — 25010000002 OXALIPLATIN PER 0.5 MG: Performed by: NURSE PRACTITIONER

## 2020-04-29 PROCEDURE — 80053 COMPREHEN METABOLIC PANEL: CPT

## 2020-04-29 PROCEDURE — 96411 CHEMO IV PUSH ADDL DRUG: CPT

## 2020-04-29 PROCEDURE — 96368 THER/DIAG CONCURRENT INF: CPT

## 2020-04-29 PROCEDURE — 25010000002 PALONOSETRON PER 25 MCG: Performed by: NURSE PRACTITIONER

## 2020-04-29 PROCEDURE — 25010000002 FLUOROURACIL PER 500 MG: Performed by: NURSE PRACTITIONER

## 2020-04-29 PROCEDURE — 96375 TX/PRO/DX INJ NEW DRUG ADDON: CPT

## 2020-04-29 PROCEDURE — 96413 CHEMO IV INFUSION 1 HR: CPT

## 2020-04-29 PROCEDURE — 96367 TX/PROPH/DG ADDL SEQ IV INF: CPT

## 2020-04-29 PROCEDURE — 25010000002 DEXAMETHASONE SODIUM PHOSPHATE 100 MG/10ML SOLUTION: Performed by: NURSE PRACTITIONER

## 2020-04-29 PROCEDURE — 96415 CHEMO IV INFUSION ADDL HR: CPT

## 2020-04-29 PROCEDURE — 99215 OFFICE O/P EST HI 40 MIN: CPT | Performed by: NURSE PRACTITIONER

## 2020-04-29 PROCEDURE — 25010000002 FOSAPREPITANT PER 1 MG: Performed by: NURSE PRACTITIONER

## 2020-04-29 PROCEDURE — 96416 CHEMO PROLONG INFUSE W/PUMP: CPT

## 2020-04-29 PROCEDURE — 85025 COMPLETE CBC W/AUTO DIFF WBC: CPT

## 2020-04-29 PROCEDURE — 25010000002 LEUCOVORIN CALCIUM PER 50 MG: Performed by: NURSE PRACTITIONER

## 2020-04-29 RX ORDER — FAMOTIDINE 10 MG/ML
20 INJECTION, SOLUTION INTRAVENOUS AS NEEDED
Status: CANCELLED | OUTPATIENT
Start: 2020-04-29

## 2020-04-29 RX ORDER — FLUOROURACIL 50 MG/ML
400 INJECTION, SOLUTION INTRAVENOUS ONCE
Status: COMPLETED | OUTPATIENT
Start: 2020-04-29 | End: 2020-04-29

## 2020-04-29 RX ORDER — DIPHENHYDRAMINE HYDROCHLORIDE 50 MG/ML
50 INJECTION INTRAMUSCULAR; INTRAVENOUS AS NEEDED
Status: CANCELLED | OUTPATIENT
Start: 2020-04-29

## 2020-04-29 RX ORDER — POTASSIUM CHLORIDE 20 MEQ/1
20 TABLET, EXTENDED RELEASE ORAL DAILY
Qty: 30 TABLET | Refills: 2 | Status: SHIPPED | OUTPATIENT
Start: 2020-04-29 | End: 2020-06-24

## 2020-04-29 RX ORDER — PALONOSETRON 0.05 MG/ML
0.25 INJECTION, SOLUTION INTRAVENOUS ONCE
Status: CANCELLED | OUTPATIENT
Start: 2020-04-29

## 2020-04-29 RX ORDER — PALONOSETRON 0.05 MG/ML
0.25 INJECTION, SOLUTION INTRAVENOUS ONCE
Status: COMPLETED | OUTPATIENT
Start: 2020-04-29 | End: 2020-04-29

## 2020-04-29 RX ORDER — FLUOROURACIL 50 MG/ML
400 INJECTION, SOLUTION INTRAVENOUS ONCE
Status: CANCELLED | OUTPATIENT
Start: 2020-04-29

## 2020-04-29 RX ORDER — DEXTROSE MONOHYDRATE 50 MG/ML
250 INJECTION, SOLUTION INTRAVENOUS ONCE
Status: COMPLETED | OUTPATIENT
Start: 2020-04-29 | End: 2020-04-29

## 2020-04-29 RX ORDER — DEXTROSE MONOHYDRATE 50 MG/ML
250 INJECTION, SOLUTION INTRAVENOUS ONCE
Status: CANCELLED | OUTPATIENT
Start: 2020-04-29

## 2020-04-29 RX ADMIN — FLUOROURACIL 830 MG: 50 INJECTION, SOLUTION INTRAVENOUS at 12:24

## 2020-04-29 RX ADMIN — FLUOROURACIL 4970 MG: 50 INJECTION, SOLUTION INTRAVENOUS at 12:23

## 2020-04-29 RX ADMIN — LEUCOVORIN CALCIUM 830 MG: 350 INJECTION, POWDER, LYOPHILIZED, FOR SOLUTION INTRAMUSCULAR; INTRAVENOUS at 10:09

## 2020-04-29 RX ADMIN — DEXAMETHASONE SODIUM PHOSPHATE 12 MG: 10 INJECTION, SOLUTION INTRAMUSCULAR; INTRAVENOUS at 09:18

## 2020-04-29 RX ADMIN — SODIUM CHLORIDE 100 ML: 9 INJECTION, SOLUTION INTRAVENOUS at 09:34

## 2020-04-29 RX ADMIN — DEXTROSE 250 ML: 50 INJECTION, SOLUTION INTRAVENOUS at 09:16

## 2020-04-29 RX ADMIN — PALONSETRON HYDROCHLORIDE 0.25 MG: 0.25 INJECTION, SOLUTION INTRAVENOUS at 09:16

## 2020-04-29 RX ADMIN — OXALIPLATIN 175 MG: 5 INJECTION, SOLUTION, CONCENTRATE INTRAVENOUS at 10:10

## 2020-04-29 NOTE — PROGRESS NOTES
REASON FOR FOLLOW UP:     1. Newly diagnosed rectal cancer, rectal ultrasound examination reported T3N0 disease without lymphadenopathy. She does have small pulmonary nodule 6-7 mm and 2 mm with indeterminate feature. There is no solid evidence of metastatic disease otherwise. Patient has stage IIA (T3N0M0) disease.  2. The patient is heterozygous factor V Leiden, currently on prophylactic anticoagulation with Lovenox 40 mg daily given her increased risk of thrombosis with MediPort and GI malignancy.   3.  The 8 mm hypermetabolic right upper lobe pulmonary nodule is suspicious for metastatic as well.    4.  Patient had a PowerPort placement on the left upper chest by Dr. Joseph on 8/9/2019.  5.  Patient was started on concurrent infusional 5-FU chemoradiation therapy on 8/12/2019, with planned complete surgical resection and further adjuvant chemotherapy with intention to cure the disease.   6. Patient underwent surgical resection of the primary rectal cancer by Dr. Ye on 12/2/2019.  Pathology evaluation reported residual T3N1 disease stage IIIb.  7. Diarrhea related to therapy and radiation.   8. Patient was started cycle 1 day 1 of adjuvant FOLFOX 6 on 1/23/2020.  9.  On 2/5/2020 FOLFOX 6 cycle 2 delayed secondary to neutropenia.  Patient was given 3 days of Granix injection.  After cycle #2 we planned 3 days of Granix with each cycle.    10.  Patient also intolerant of oral iron.  Patient received 2 doses of IV injectafer on 02/05/2020 and 02/12/2020.   10. 02/12/2020 Proceed with cycle #2 of FOLFOX 6 with 3 days of Granix.  11.  3/11/2020 cycle 4 postponed secondary to abdominal pain and occasional low-grade fevers.  CT scans ordered.  12.  Cycle #4 resumed after CT scan revealed no evidence of disease.  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 Ye. patient has no fever.  Continue to observe.     HISTORY OF PRESENT ILLNESS:  The patient  is a 39 y.o. female with the above medical history, who returns the office today due for cycle #6 of FOLFOX-6.    Unfortunately after she received her last cycle of therapy, she was hospitalized from 4/24 to 4/26/2020, presenting with significant urinary frequency.  She was diagnosed with a UTI and received IV Rocephin.  CT of the abdomen and pelvis was obtained showing decrease in the size of known fluid collection.  The patient was supposed to see Dr. Ye in May, however was able to see her during hospitalization.  Patient evaluated for colovaginal fistula with plans to further assess this once patient completes chemotherapy.  Overall everyone was pleased with the improvement noted on scans.  Patient was ultimately discharged on additional cefdinir to take for 6 days.    As she is reviewed back today she has taken 3 doses of the Ceftin ear.  She states her urinary symptoms have resolved.  We reviewed her blood work which is acceptable for treatment but notably with WBC and platelets slightly lower compared to previous treatment.  She has had some nosebleeds in recent days and platelets did drop as low as 73,000 while hospitalized.  We discussed this is likely contributing to the nosebleed she is experiencing, mainly in the morning.    She also notes after her last treatment having significant hyperlacrimation for about a week following treatment.  This led to some difficulty reading words on the television for example and also some irritation of her left eye.  The excessive tearing has stopped but her left eye remains slightly irritated today with slight injection noted.  She is also noting discoloration of her skin, notably on the hands.  We discussed all this is related to 5-FU chemotherapy.    She denies other concerns this time.    Past Medical History:   Diagnosis Date   • Abnormal Pap smear of cervix 02/02/1998    JULIUS I   • Anemia in pregnancy    • Anxiety    • Cervical lymphadenitis 06/06/2012    SEEN AT  [facility]   • Chronic diarrhea    • Colon polyps     FOLLOWED BY DR. GERONIMO YE   • Depression    • Factor V Leiden (CMS/HCC)     DX 2019   • GERD (gastroesophageal reflux disease)    • Heart murmur     ?   • Hematochezia    • Hemorrhoids    • History of chemotherapy     FOLLOWED BY DR. ALEXANDRU HUNT   • History of gestational diabetes    • History of pre-eclampsia    • History of radiation therapy     FOLLOWED BY DR. JAVON LEWIS   • History of TB skin testing 2009    TB Skin Test   • HPV in female    • IBS (irritable bowel syndrome)    • Infected insect bite of neck 2016    SEEN AT [facility]   • Irritable bowel syndrome    • Kidney stones 2007    SEEN AT [facility]   • Lumbar disc disease    • On anticoagulant therapy    • PIH (pregnancy induced hypertension)    • Pulmonary embolism (CMS/HCC) 2019    ADMITTED TO [facility]   • Rectal cancer (CMS/HCC) 2019    INVASIVE MODERATELY DIFFERENTIATED ADENOCARCINOMA, CHEMO AND XRT FINISHED 2019   • Right leg pain    • Right ureteral stone 10/01/2019    SEEN AT [facility]   • SAB (spontaneous ) 1996     A2-1 INDUCED   • Sepsis due to cellulitis (CMS/HCC) 2002    LEFT GREAT TOE, ADMITTED TO [facility]     Past Surgical History:   Procedure Laterality Date   • CHOLECYSTECTOMY N/A 10/10/2003    LAPAROSCOPIC WITH CHOLANGIOGRAM, DR. JAMEY TALAVERA AT [facility]   • COLON RESECTION N/A 2019    Procedure: laparoscopic low anterior colon resection with mobilization of splenic flexure and diverting loop ileostomy: ERAS;  Surgeon: Geronimo Ye MD;  Location: [facility];  Service: General   • COLONOSCOPY W/ POLYPECTOMY N/A 2019    15 MM TUBULOVILLOUS ADENOMA POLYP IN HEPATIC FLEXURE, 20 MMTUBULOVILLOUS ADENOMA WITH HIGH GRADE DYSPLASIA IN RECTOSIGMOID, 4 CM MASS IN MID RECTUM, PATH: INVASIVE MODERATELY DIFFERENTIATED ADENOCARCINOMA, DR. JENNIFER LI AT [facility]   • DILATATION AND EVACUATION N/A    •  ENDOSCOPY N/A 07/08/2019    LA GRADE A ESOPHAGITIS, GASTRITIS, ALL BIOPSIES BENIGN, DR. ROLAND LI AT [facility]   • PAP SMEAR N/A 01/24/2014   • SIGMOIDOSCOPY N/A 7/24/2019    INFILTRATIVE PARTIALLY OBSTRUCTING LARGE RECTAL CANCER, AREA TATOOED, DR. GERONIMO YE AT [facility]   • SIGMOIDOSCOPY N/A 11/23/2019    AN ULCERATED PARTIALLY OBSTRUCTING MASS IN MID RECTUM, AREA TATTOOED, DR. GERONIMO YE AT [facility]   • TRANSRECTAL ULTRASOUND N/A 7/24/2019    Procedure: ULTRASOUND TRANSRECTAL;  Surgeon: Geronimo Ye MD;  Location: [facility] ENDOSCOPY;  Service: General   • URETEROSCOPY LASER LITHOTRIPSY WITH STENT INSERTION Right 10/2019   • VAGINAL DELIVERY N/A 09/18/1998   • VENOUS ACCESS DEVICE (PORT) INSERTION Left 8/9/2019    Procedure: INSERTION VENOUS ACCESS DEVICE;  Surgeon: Sj Joseph MD;  Location: [facility];  Service: General   • WISDOM TOOTH EXTRACTION  1993       HEMATOLOGIC/ONCOLOGIC HISTORY:      The patient reports she has intermittent rectal bleeding and urgency, mucous with her stool, starting sometime in 2016. At that time she was referred to GI service, and was diagnosed of irritable bowel syndrome. Nevertheless she had worsening urgency for bowel movement, and had a couple of incidents losing control of stool. She was recently seen by Roland Li MD again and had colonoscopy and EGD exam on 07/08/2019. She was found having a circumferential rectal mass. According to the procedure note, the patient had a fungating circumferential bleeding 4 cm mass in the middle rectum, at distance between 13 cm and 17 cm from the anus. Mass was causing partial obstruction. There were also colon polyps found at the hepatic flexure and also at the rectosigmoid junction 23 cm from the anus. Both were resected and retrieved. EGD examination reported grade A esophagitis at the GE junction and patchy discontinuous erythema and aggravation of the mucosa without bleeding in the stomach body. There is normal  mucosa of the duodenum. Pathology evaluation from this procedure reported moderately differentiated adenocarcinoma involving the rectal mass. The rectal sigmoid junction polyp was tubular/tubulovillous adenoma with high grade dysplasia negative for invasive malignancy. The hepatic flexure polyp was also tubular/tubulovillous adenoma negative for high grade dysplasia or malignancy. The biopsy from the esophagus reports squamocolumnar mucosa with inflammatory changes suggestive of mild reflux esophagitis, negative for interstitial metaplasia. Gastric biopsy was benign and duodenal biopsy was also benign. There is no mention of H-pylori.     Patient was subsequently referred to colorectal surgeon Padmaja Ye MD for further evaluation. The patient had CT scan examination for chest, abdomen and pelvis requested by Dr. Ye and were done on 07/13/2019. The study reported no evidence of lymphadenopathy in the abdomen and pelvis. There was wall thickening of the rectosigmoid junction. Normal spleen, pancreas, adrenal glands and kidneys. There was fatty liver infiltration but no focal lymphatic lesions. There was a small 6-7 mm noncalcified nodule in the right upper lobe and a tiny 3 mm subpleural nodule in the right middle lobe. No mediastinal or hilar lymphadenopathy.     Dr. Ye performed staging rectal ultrasound examination. This study reported tumor penetrating through the muscularis propria as T3 disease; there were no lymph nodes identified.    She had a hospitalization in late September 2019 because of newly discovered pulmonary emboli.  She was on prophylactic Lovenox prior to the incident of PE.  Now she is on full dose Xarelto anticoagulation.  Patient reports no further chest pain dyspnea.  She denies bleeding or bruising.  During her hospitalization, she was seen by Dr. Ye, who plans to have surgery 8 to 12 weeks after finishing radiation therapy.  She finished her radiation on 9/19/2019.     I noticed  patient also presented to the emergency room on 10/1/2019 complaining of right flank area pain.  I reviewed the images studies and indeed she has a very small 1 to 2 mm obstructing kidney stone in the UV junction.  Patient is still symptomatic with some pains and dysuria.  She denies fever sweating or chills.    Laboratory study on 10/7/2019 reported normal CBC including hemoglobin 13.1, platelets 301,000, WBC 6170 and ANC 4900 lymphocytes 590 monocytes 480.      The nurse reported malfunction of port-a-catheter on 10/7/2019.  Port study on 10/8/2019 showed fibrin sheath around the distal aspect of the Mediport catheter in the SVC. This does not appear to hinder injection, but does prevent aspiration at this time.    Patient underwent surgical resection of the colon on 12/2/2019 with Dr. Ye.  Pathology evaluation reported residual T3 disease, also 1 out of 14 lymph nodes positive for malignancy.  So this patient in either had at least stage IIIb disease (T3 N1 M0?).      Adjuvant chemotherapy FOLFOX 6 will be started on 1/22/2020.  Laboratory study reported iron saturation 10%, free iron 31 TIBC 319 and ferritin 168.  Her hemoglobin was 11.8, WBC 5600, and platelets 347,000.    Patient was here on 02/12/2020 for cycle 2 of her FOLFOX.  This is delayed x1 week secondary to neutropenia.  The patient ultimately received 3 days worth of Neupogen with recovery of her blood counts.  Of note, the patient struggled with significant nausea without any episodes of vomiting following cycle #1 of chemotherapy resulting in significant weight loss.  She is up 12 pounds over the last week as her appetite has normalized.  We will add Emend to her care plan.    She is having several loose stools per her colostomy and has been hesitant to take Imodium due to prior history of constipation.  I encouraged her to try this with a maximum of 8 tablets/day.  She denies any infectious symptoms including fevers or chills.  No excess  bleeding or bruising noted.  She had the expected cold sensitivity related to the Oxaliplatin for about 3 days following treatment.    Labs from 02/12/2020 demonstrated total white blood cell count of 5.14, ANC of 3.06, hemoglobin of 11.2, platelets of 211,000.  She was found to be iron deficient last week and is intolerant to oral iron secondary to GI distress.  For this reason, she initiated IV iron therapy with Injectafer last week.  She had received her second dose 02/12/2020    Patient has normalized hemoglobin 12.2 on 2/26/2020.    MEDICATIONS    Current Outpatient Medications:   •  cefdinir (OMNICEF) 300 MG capsule, Take 1 capsule by mouth 2 (Two) Times a Day for 6 days., Disp: 12 capsule, Rfl: 0  •  clonazePAM (KlonoPIN) 1 MG tablet, Take 1 tablet by mouth 3 (Three) Times a Day As Needed for Anxiety or Seizures., Disp: 90 tablet, Rfl: 0  •  escitalopram (LEXAPRO) 20 MG tablet, Take 1 tablet by mouth Daily., Disp: 90 tablet, Rfl: 3  •  famotidine (PEPCID) 20 MG tablet, TAKE 1 TABLET BY MOUTH TWICE A DAY, Disp: 60 tablet, Rfl: 1  •  Filgrastim-aafi (NIVESTYM) 480 MCG/0.8ML solution prefilled syringe, Inject 480 mcg as directed Daily. For 3 days after each chemotherapy cycle. Cycles are every 2 weeks., Disp: 4.8 mL, Rfl: 6  •  HYDROcodone-acetaminophen (NORCO) 7.5-325 MG per tablet, Take 1 tablet by mouth Every 4 (Four) Hours As Needed for Moderate Pain ., Disp: 180 tablet, Rfl: 0  •  Loratadine (CLARITIN) 10 MG capsule, Take  by mouth., Disp: , Rfl:   •  metoprolol tartrate (LOPRESSOR) 25 MG tablet, TAKE 1/2 TABLET BY MOUTH EVERY 12 (TWELVE) HOURS., Disp: 60 tablet, Rfl: 1  •  ondansetron (ZOFRAN) 8 MG tablet, Take 1 tablet by mouth 3 (Three) Times a Day As Needed for Nausea or Vomiting., Disp: 60 tablet, Rfl: 5  •  prochlorperazine (COMPAZINE) 10 MG tablet, Take 1 tablet by mouth Every 6 (Six) Hours As Needed for Nausea or Vomiting., Disp: 30 tablet, Rfl: 2  •  rivaroxaban (XARELTO) 20 MG tablet, Take 1  tablet by mouth Daily. Start after finishing starter pack. (Patient taking differently: Take 20 mg by mouth Daily. PT HOLDING 48 HOURS PRIOR TO SURGERY), Disp: 30 tablet, Rfl: 11    ALLERGIES:   No Known Allergies    SOCIAL HISTORY:       Social History     Tobacco Use   • Smoking status: Light Tobacco Smoker     Packs/day: 1.00     Years: 24.00     Pack years: 24.00     Types: Electronic Cigarette, Cigarettes   • Smokeless tobacco: Never Used   Substance Use Topics   • Alcohol use: Yes     Comment: Rarely   • Drug use: No         FAMILY HISTORY:   Mother has positive factor V Leiden mutation, although she did not have thrombosis, mother also is coronary disease with stenting, she also is occasional bruising.  Maternal grandmother had DVT, she had multiple surgical procedures.  Patient mother's half-brother had metastatic colon cancer at diagnosis in his 50s.  Maternal great aunt has breast cancer.  Patient will follow his skin cancer in his 60s, exclusive.    REVIEW OF SYSTEMS:  Review of Systems   Constitutional: Positive for fatigue. Negative for appetite change, chills, diaphoresis and fever.   HENT: Positive for nosebleeds. Negative for congestion, hearing loss, mouth sores and trouble swallowing.    Eyes: Negative for photophobia and visual disturbance.   Respiratory: Negative for cough, chest tightness and shortness of breath.    Cardiovascular: Negative for chest pain, palpitations and leg swelling.   Gastrointestinal: Positive for diarrhea (loose output via ostomy - stable) and nausea (controlled with zofran). Negative for abdominal distention, abdominal pain, anal bleeding, constipation, rectal pain and vomiting.   Endocrine: Positive for cold intolerance. Negative for heat intolerance.   Genitourinary: Negative for frequency, hematuria and urgency.        See HPI - sx resolved now   Musculoskeletal: Negative for arthralgias, back pain and joint swelling.   Skin: Negative for rash and wound.  "  Allergic/Immunologic: Positive for immunocompromised state (Secondary to adjuvant chemotherapy). Negative for environmental allergies and food allergies.   Neurological: Positive for numbness (cold exposure. ). Negative for dizziness, speech difficulty and headaches.   Hematological: Negative for adenopathy. Does not bruise/bleed easily.   Psychiatric/Behavioral: Negative for agitation, behavioral problems, confusion and suicidal ideas.          Vitals:    04/29/20 0748   BP: 128/86   Pulse: 85   Resp: 16   Temp: 98 °F (36.7 °C)   TempSrc: Oral   SpO2: 94%   Weight: 99.2 kg (218 lb 11.2 oz)   Height: 166 cm (65.35\")   PainSc: 0-No pain     Current Status 4/29/2020   ECOG score 0     PHYSICAL EXAM:    GENERAL:  Well-developed, well-nourished  female in no acute distress.  SKIN:  Warm, dry without rashes, purpura or petechiae.  Slight brownish discoloration to the fingers of both hands  EYES:  Pupils equal, round and reactive to light.  EOMs intact.  Mild left scleral injection.  Conjunctive are normal.  EARS:  Hearing intact.  NOSE: Patient with scant nosebleeds noted.  CHEST:  Breathing unlabored, no acute distress.  Lungs clear to auscultation bilaterally. Mediport in the left chest wall, benign looking.   CARDIAC:  Regular rate and rhythm without murmurs. Normal S1,S2.   ABDOMEN: No tenderness.  No mass palpable.  Bowel sounds present.  Ostomy in place with small amount of pasty output.     EXTREMITIES:  No clubbing, cyanosis or edema.  NEUROLOGICAL:  Cranial Nerves II-XII grossly intact.  No focal neurological deficits.  PSYCHIATRIC:  Normal affect and mood.        RECENT LABS:  Results from last 7 days   Lab Units 04/29/20  0746 04/26/20  0307 04/25/20  0324 04/24/20  2321   WBC 10*3/mm3 3.08* 2.89* 4.64 4.74   NEUTROS ABS 10*3/mm3 1.76 1.71  --  2.92   LYMPHS ABS 10*3/mm3  --  0.62*  --  0.67*   HEMOGLOBIN g/dL 12.7 11.1* 11.7* 13.3   HEMATOCRIT % 38.5 34.2 35.0 39.7   PLATELETS 10*3/mm3 117* 73* 82* " 114*     Results from last 7 days   Lab Units 04/26/20  0307 04/25/20  0324 04/24/20  2321   SODIUM mmol/L 139 138 138   POTASSIUM mmol/L 4.1 3.4* 3.3*   CHLORIDE mmol/L 108* 104 102   CO2 mmol/L 23.0 21.5* 22.1   BUN mg/dL 6 9 9   CREATININE mg/dL 0.59 0.66 0.84   CALCIUM mg/dL 8.1* 8.0* 8.8   ALBUMIN g/dL 2.60*  --  3.60   BILIRUBIN mg/dL <0.2*  --  0.4   ALK PHOS U/L 77  --  111   ALT (SGPT) U/L 27  --  37*   AST (SGOT) U/L 22  --  25   GLUCOSE mg/dL 102* 120* 148*   MAGNESIUM mg/dL 2.0 1.7  --          Assessment/Plan    1.  Newly diagnosed rectal cancer, rectal ultrasound examination reported T3N0 disease without lymphadenopathy. CT scan of chest, abdomen and pelvis reported no lymphadenopathy in the abdomen, pelvis or chest. She does have small pulmonary nodule 6-7 mm and 2 mm with indeterminate feature. There is no solid evidence of metastatic disease otherwise.   · Based on the CT scan and rectal ultrasound imaging studies, this patient had stage IIA (T3N0M0) disease.    · She had PET scan examination on 8/8/2019 which reported a hypermetabolic right upper lobe pulmonary nodule 6 mm with SUV 5.6.  This is suspicious for metastatic disease.  This patient may have stage IV disease.   · She initiated concurrent radiation with continuous 5-FU on 8/12/2019.  · Patient finished concurrent chemoradiation therapy.  · Patient underwent surgical resection of the rectal tumor and diverting loop ileostomy on 12/2/2019 with Dr. Ye.  Pathology evaluation reported residual T3 disease, with 1 out of 14 lymph nodes positive for malignancy.  Certainly this patient has at least stage IIIb rectal cancer (T3 N1 M0?)  · On 1/22/2020 adjuvant chemotherapy FOLFOX 6 regimen initiated.    · On 2/5/2022 cycle #2 was delayed secondary to neutropenia.  She was given 3 days of Granix.   · Emend added with cycle 3.  With improved nausea control  · Continuing home Granix x3 days following 5-FU disconnect  · 3/11/2020 due for cycle 4,  however, she is experiencing progressive abdominal pain and occasional fevers.   · CT scan performed on 3/13/2020 reveals no evidence of progressive or recurrent disease.  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  · Patient hospitalized 4/24-4/26/20 after cycle 5 of chemotherapy with acute UTI.  CT abdomen/pelvis noting fluid collection in the presacral region having diminished in size compared to CT dated 3/13/2020.  Patient was evaluated by Dr. Ye while in the hospital with further plans to evaluate possible colovaginal fistula following completion of chemotherapy.  Patient did respond to IV antibiotics and discharged home on oral cefdinir.  · Patient due today for cycle 6 of FOLFOX.  Urinary symptoms have resolved and patient continues on oral cefdinir for 3 more days.  She is reporting trouble with hyperlacrimation for almost a week after last cycle along with skin discoloration, notably in the hands.  We discussed both of these side effects are related to 5-FU.  Further discussed below.  Patient did have thrombocytopenia noted in the hospital with platelets down to 73,000.  Platelets have recovered to 117,000 currently.  She is having some mild nosebleeds.  She is on Xarelto and I cautioned her to monitor nosebleeds for worsening and to call with such.  Otherwise we will continue with therapy as scheduled and she will see Dr. Nguyen back in 2 weeks.      2 .  Pulmonary emboli with background of positive factor V Leiden mutation   · The patient has heterozygous factor V Leiden mutation and therefore was on low-dose anticoagulation with Lovenox, 40 mg daily given her increased risk of thrombosis with MediPort and GI malignancy.    · Nevertheless this patient was found to having pulmonary emboli on 9/20/2019 despite low-dose Lovenox.  She had a brief hospitalization in late September 2019, she was started on full dose Xarelto anticoagulation per protocol.    · The patient continues on Xarelto and is tolerating  "this well.  Is having some issues with slight nosebleeds as outlined above.  Patient to call with worsening.     3.  Loose, frequent stools:   · Patient is s/p surgical resection of the rectum tumor and diverting loop ileostomy on 12/2/2019 with Dr. Ye.  · She reports having liquid stools in her colostomy bag.  She reports her liquid stools typically appear \"clear mustard yellow\" or dark.  · On 4/1/2020 patient was recommended to start on Bentyl    · Patient notes output has stabilized and is more pasty which is baseline for her.    4. Pelvic pain: Related to previous radiation therapy and the surgery for resection of the primary rectal cancer.  Stable see above.    5. Hypokalemia: .  · Her potassium today is 3.2.  Patient previously taking potassium chloride but does not currently have this.  We will prescribe begin to take 20 mEq daily.    6.  This patient also has strong family history for malignancy the patient had appointment with genetic counseling on September 3, 2019. NF1 [id number] 3C >T.    7.  Nausea: This does seem to be multifactorial with a large part due to anxiety.  The patient does continue on Lexapro at 20 mg daily as well as Klonopin 0.5 mg once daily as needed.      · Emend was added with cycle 3 with much better management of nausea  · She can continue to utilize Zofran and Compazine as needed    8.  Mild anemia, improved since her surgery.  She also has a history of iron deficiency.  Iron studies reveal a saturation of just 10%.  She was started on oral iron but was unable to tolerate due to GI side effects.   · Status post Injectafer 2/5/2020 and 2/12/2020   · Hemoglobin was within normal limits on 4/1/2020 at 13.1  · Hemoglobin today is within normal limits at 13.7    9.  Neutropenia secondary to chemotherapy.  · As noted above, the patient will receive 3 days of Granix begin on day 4 of each cycle of chemotherapy.  We obtained approval for self administration at home.  This started following " cycle 3 chemotherapy  · Total white count on 4/1/2020 was 3930, ANC 2540  · Total white count today is 3.0, ANC 1.7    10.  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    · Jaison Ye and Patrick spoke regarding CT scan and felt OK to proceed with treatment.    · Patient saw Dr. Ye during brief hospitalization 4/24-4/26 with repeat CT scan showing improvement of fluid collection in the presacral region.  Plan to follow-up on possible fistula following completion of chemotherapy.    11.  Cold sensitivity secondary to oxaliplatin  · Lasting 10 days to 2 weeks with numbness almost constantly.  · Patient began B complex on 4/1/2020  · Continue B complex      12.  Hypomagnesemia.  · Most recent magnesium level 4/26/2020 normal at 2.0.  Continue to monitor.    13.  Peripheral neuropathy secondary to chemotherapy.  This is mild involving bilateral hands.  However her cholecystectomy actually last longer.  She is aware not to drink or eat cold food/beverage nor touch cold objects.    14.  Hyperlacrimation and mild scleral irritation related to 5-FU.  After discussion with Dr. Nguyen we will continue 5-FU at same dose but I have prescribed the patient dexamethasone 0.1% eyedrops to utilize 4 times a day as needed.  Patient advised to call back with worsening of symptoms or new ophthalmic issues.    15.  Mild epistaxis.  Patient's platelet count did drop to 73,000 during hospitalization and has improved to 117,000 today.  Patient advised to monitor nosebleeds and call with worsening symptoms.  She will continue her Xarelto for now.    PLAN:  1. Proceed with cycle #6 FOLFOX 6.  2. Continue home Granix injections x3 days starting day #4.   3. Continue cefdinir as prescribed.  Call with recurrent urinary symptoms.  4. Continue compazine and zofran as needed for nausea.   5. Continue Bentyl 20 mg 4 times daily as needed for abdominal cramping  6. Continue B complex vitamin  7. Patient to begin potassium chloride 20 mEq daily.  Prescription  sent in.  8. Patient to monitor upper Taxus and call with worsening bleeding.  For now she will continue Xarelto and monitor symptoms.  9. Return in 2 weeks for follow-up with Dr. Nguyen and cycle #8 FOLFOX.  10. Had originally planned to consider repeat CT scans after cycle 8.  However patient just had CT scans abdomen/pelvis during hospitalization 4/25/2020.  May consider repeat CT scan chest only.      CC: Minesh Ye MD

## 2020-04-30 LAB
BACTERIA SPEC AEROBE CULT: NORMAL
BACTERIA SPEC AEROBE CULT: NORMAL

## 2020-05-01 ENCOUNTER — INFUSION (OUTPATIENT)
Dept: ONCOLOGY | Facility: HOSPITAL | Age: 40
End: 2020-05-01

## 2020-05-01 DIAGNOSIS — Z45.2 ENCOUNTER FOR FITTING AND ADJUSTMENT OF VASCULAR CATHETER: ICD-10-CM

## 2020-05-01 DIAGNOSIS — C20 ADENOCARCINOMA OF RECTUM (HCC): Primary | ICD-10-CM

## 2020-05-01 PROCEDURE — 25010000003 HEPARIN LOCK FLUSH PER 10 UNITS: Performed by: INTERNAL MEDICINE

## 2020-05-01 RX ORDER — HEPARIN SODIUM (PORCINE) LOCK FLUSH IV SOLN 100 UNIT/ML 100 UNIT/ML
500 SOLUTION INTRAVENOUS AS NEEDED
Status: CANCELLED | OUTPATIENT
Start: 2020-05-01

## 2020-05-01 RX ORDER — SODIUM CHLORIDE 0.9 % (FLUSH) 0.9 %
10 SYRINGE (ML) INJECTION AS NEEDED
Status: CANCELLED | OUTPATIENT
Start: 2020-05-01

## 2020-05-01 RX ORDER — SODIUM CHLORIDE 0.9 % (FLUSH) 0.9 %
10 SYRINGE (ML) INJECTION AS NEEDED
Status: DISCONTINUED | OUTPATIENT
Start: 2020-05-01 | End: 2020-05-01 | Stop reason: HOSPADM

## 2020-05-01 RX ORDER — HEPARIN SODIUM (PORCINE) LOCK FLUSH IV SOLN 100 UNIT/ML 100 UNIT/ML
500 SOLUTION INTRAVENOUS AS NEEDED
Status: DISCONTINUED | OUTPATIENT
Start: 2020-05-01 | End: 2020-05-01 | Stop reason: HOSPADM

## 2020-05-01 RX ADMIN — Medication 500 UNITS: at 10:27

## 2020-05-01 RX ADMIN — SODIUM CHLORIDE, PRESERVATIVE FREE 10 ML: 5 INJECTION INTRAVENOUS at 10:27

## 2020-05-02 ENCOUNTER — DOCUMENTATION (OUTPATIENT)
Dept: ONCOLOGY | Facility: CLINIC | Age: 40
End: 2020-05-02

## 2020-05-02 ENCOUNTER — HOSPITAL ENCOUNTER (EMERGENCY)
Facility: HOSPITAL | Age: 40
Discharge: HOME | End: 2020-05-02
Attending: EMERGENCY MEDICINE | Admitting: EMERGENCY MEDICINE

## 2020-05-02 VITALS
BODY MASS INDEX: 35.5 KG/M2 | SYSTOLIC BLOOD PRESSURE: 141 MMHG | DIASTOLIC BLOOD PRESSURE: 93 MMHG | HEIGHT: 66 IN | HEART RATE: 95 BPM | RESPIRATION RATE: 16 BRPM | OXYGEN SATURATION: 96 % | WEIGHT: 220.9 LBS | TEMPERATURE: 98.4 F

## 2020-05-02 DIAGNOSIS — M79.89 BILATERAL HAND SWELLING: Primary | ICD-10-CM

## 2020-05-02 DIAGNOSIS — Z92.21 HISTORY OF CHEMOTHERAPY: ICD-10-CM

## 2020-05-02 LAB
ANION GAP SERPL CALCULATED.3IONS-SCNC: 13.8 MMOL/L (ref 5–15)
BUN BLD-MCNC: 9 MG/DL (ref 6–20)
BUN/CREAT SERPL: 12.7 (ref 7–25)
CALCIUM SPEC-SCNC: 8.9 MG/DL (ref 8.6–10.5)
CHLORIDE SERPL-SCNC: 102 MMOL/L (ref 98–107)
CO2 SERPL-SCNC: 22.2 MMOL/L (ref 22–29)
CREAT BLD-MCNC: 0.71 MG/DL (ref 0.57–1)
DEPRECATED RDW RBC AUTO: 79.6 FL (ref 37–54)
EOSINOPHIL # BLD MANUAL: 0.11 10*3/MM3 (ref 0–0.4)
EOSINOPHIL NFR BLD MANUAL: 3 % (ref 0.3–6.2)
ERYTHROCYTE [DISTWIDTH] IN BLOOD BY AUTOMATED COUNT: 23.5 % (ref 12.3–15.4)
GFR SERPL CREATININE-BSD FRML MDRD: 91 ML/MIN/1.73
GLUCOSE BLD-MCNC: 129 MG/DL (ref 65–99)
HCT VFR BLD AUTO: 39.2 % (ref 34–46.6)
HGB BLD-MCNC: 13.4 G/DL (ref 12–15.9)
LYMPHOCYTES # BLD MANUAL: 0.97 10*3/MM3 (ref 0.7–3.1)
LYMPHOCYTES NFR BLD MANUAL: 25.7 % (ref 19.6–45.3)
MCH RBC QN AUTO: 32.3 PG (ref 26.6–33)
MCHC RBC AUTO-ENTMCNC: 34.2 G/DL (ref 31.5–35.7)
MCV RBC AUTO: 94.5 FL (ref 79–97)
NEUTROPHILS # BLD AUTO: 2.7 10*3/MM3 (ref 1.7–7)
NEUTROPHILS NFR BLD MANUAL: 71.3 % (ref 42.7–76)
PLAT MORPH BLD: NORMAL
PLATELET # BLD AUTO: 143 10*3/MM3 (ref 140–450)
PMV BLD AUTO: 9.3 FL (ref 6–12)
POTASSIUM BLD-SCNC: 3.4 MMOL/L (ref 3.5–5.2)
RBC # BLD AUTO: 4.15 10*6/MM3 (ref 3.77–5.28)
RBC MORPH BLD: NORMAL
SODIUM BLD-SCNC: 138 MMOL/L (ref 136–145)
WBC MORPH BLD: NORMAL
WBC NRBC COR # BLD: 3.78 10*3/MM3 (ref 3.4–10.8)

## 2020-05-02 PROCEDURE — 25010000002 DIPHENHYDRAMINE PER 50 MG: Performed by: EMERGENCY MEDICINE

## 2020-05-02 PROCEDURE — 96374 THER/PROPH/DIAG INJ IV PUSH: CPT

## 2020-05-02 PROCEDURE — 99284 EMERGENCY DEPT VISIT MOD MDM: CPT

## 2020-05-02 PROCEDURE — 96375 TX/PRO/DX INJ NEW DRUG ADDON: CPT

## 2020-05-02 PROCEDURE — 85007 BL SMEAR W/DIFF WBC COUNT: CPT | Performed by: EMERGENCY MEDICINE

## 2020-05-02 PROCEDURE — 80048 BASIC METABOLIC PNL TOTAL CA: CPT | Performed by: EMERGENCY MEDICINE

## 2020-05-02 PROCEDURE — 85025 COMPLETE CBC W/AUTO DIFF WBC: CPT | Performed by: EMERGENCY MEDICINE

## 2020-05-02 RX ORDER — METHYLPREDNISOLONE 4 MG/1
TABLET ORAL
Qty: 21 EACH | Refills: 0 | Status: SHIPPED | OUTPATIENT
Start: 2020-05-02 | End: 2020-05-13

## 2020-05-02 RX ORDER — FAMOTIDINE 10 MG/ML
20 INJECTION, SOLUTION INTRAVENOUS ONCE
Status: COMPLETED | OUTPATIENT
Start: 2020-05-02 | End: 2020-05-02

## 2020-05-02 RX ORDER — DIPHENHYDRAMINE HYDROCHLORIDE 50 MG/ML
25 INJECTION INTRAMUSCULAR; INTRAVENOUS ONCE
Status: COMPLETED | OUTPATIENT
Start: 2020-05-02 | End: 2020-05-02

## 2020-05-02 RX ADMIN — DIPHENHYDRAMINE HYDROCHLORIDE 25 MG: 50 INJECTION, SOLUTION INTRAMUSCULAR; INTRAVENOUS at 14:11

## 2020-05-02 RX ADMIN — FAMOTIDINE 20 MG: 10 INJECTION INTRAVENOUS at 14:11

## 2020-05-02 NOTE — PROGRESS NOTES
Patient called the on-call service today with reports of progressive bilateral hand swelling.  She was just disconnected from her 5-FU continuous infusion yesterday afternoon and had noted some erythema of her hands which is likely attributable to the 5-FU.  She went home and fell asleep at 3:00 and did not wake up until a few hours ago.  When she awoke, her hands far more swollen with worsening erythema.  Her right hand had more pronounced swelling to the extent that she was unable to make a fist and she notes that it is quite painful.     I have advised that the patient be evaluated in the emergency room due to the rapid progression of her symptoms and her difficulty with mobility.  She was questioning whether she should do this anyway and is agreeable with this plan.    Dr. Scales, who was on-call in the inpatient setting; was notified.

## 2020-05-02 NOTE — ED PROVIDER NOTES
EMERGENCY DEPARTMENT ENCOUNTER    Room Number:  22/22  Date of encounter:  5/2/2020  PCP: Deepika Vela III, NP-C  Historian: Patient      HPI:  Chief Complaint: Hand swelling  A complete HPI/ROS/PMH/PSH/SH/FH are unobtainable due to: None    Context: Carole Weaver is a 39 y.o. female who presents to the ED via private vehicle for evaluation for possible allergic reaction to her chemotherapy.  Patient states that they stopped her 5-FU yesterday, developed some swelling of her hands bilaterally as well as possibly some very mild facial swelling.  Denies any swelling of the lips, tongue, throat, drooling, difficulty swallowing or speaking.  Denies any difficulty breathing, new rash, nausea or vomiting.  Has been on cefdinir since Monday for a UTI has not had any issues other than some increased loose stool in her colostomy.  Patient was referred here by her cancer physicians for evaluation for possible allergic reaction.  She has not taken any medications at home for the reaction.  Has not had any similar reactions from the medication in the past.  Denies fevers, chills, cough, shortness of breath, abdominal pain.      MEDICAL RECORD REVIEW    History of rectal cancer for which she is receiving active chemotherapy    PAST MEDICAL HISTORY  Active Ambulatory Problems     Diagnosis Date Noted   • Anxiety 08/09/2016   • Irritable bowel syndrome 08/09/2016   • Immunization due 11/08/2018   • Monopolar depression (CMS/HCC) 05/09/2019   • Adenocarcinoma of rectum (CMS/HCC) 07/12/2019   • Family history of factor V Leiden mutation 08/01/2019   • Heterozygous factor V Leiden mutation (CMS/HCC) 08/02/2019   • Encounter for fitting and adjustment of vascular catheter 08/07/2019   • Functional diarrhea 09/03/2019   • Adverse effect of antineoplastic and immunosuppressive drugs, initial encounter 09/03/2019   • Hypokalemia 09/03/2019   • Hypomagnesemia 09/03/2019   • Other pulmonary embolism without acute cor  pulmonale (CMS/HCC) 09/20/2019   • UTI (urinary tract infection) 09/20/2019   • Kidney stone on right side 10/07/2019   • Hydronephrosis with ureteropelvic junction (UPJ) obstruction 10/15/2019   • Partial intestinal obstruction, unspecified as to cause (CMS/[facility]) 07/08/2019   • Irregular heart beat 12/10/2019   • Hemorrhoids, external 12/10/2019   • Hematochezia 12/10/2019   • Gastrointestinal hemorrhage, unspecified 07/08/2019   • Esophagitis 07/08/2019   • Family history of colonic polyps 12/10/2019   • Chronic diarrhea 12/10/2019   • Calculus of gallbladder 12/10/2019   • Benign neoplasm of transverse colon 07/08/2019   • Benign neoplasm of rectum and anal canal 07/08/2019   • Loss of weight 12/10/2019   • Heart murmur 12/10/2019   • Anemia 01/22/2020   • Anticoagulation adequate 01/22/2020   • Pain associated with surgical procedure 01/29/2020   • Iron deficiency 02/05/2020   • Adverse effect of iron 02/05/2020   • Drug-induced peripheral neuropathy (CMS/HCC) 04/15/2020   • On antineoplastic chemotherapy 04/25/2020   • Thrombocytopenia (CMS/HCC) 04/25/2020   • HTN (hypertension) 04/25/2020   • Ileostomy present (CMS/HCC) 04/25/2020   • Chronic anticoagulation 04/25/2020   • Chemotherapy-induced neutropenia (CMS/HCC) 04/29/2020     Resolved Ambulatory Problems     Diagnosis Date Noted   • Rectal cancer (CMS/[facility]) 11/01/2019     Past Medical History:   Diagnosis Date   • Abnormal Pap smear of cervix 02/02/1998   • Anemia in pregnancy    • Cervical lymphadenitis 06/06/2012   • Colon polyps    • Depression    • Factor V Leiden (CMS/[facility])    • GERD (gastroesophageal reflux disease)    • Hemorrhoids    • History of chemotherapy 2019   • History of gestational diabetes    • History of pre-eclampsia    • History of radiation therapy 2019   • History of TB skin testing 01/17/2009   • HPV in female 1998   • IBS (irritable bowel syndrome)    • Infected insect bite of neck 05/27/2016   • Kidney stones 08/09/2007   • Lumbar  disc disease    • On anticoagulant therapy    • PIH (pregnancy induced hypertension)    • Pulmonary embolism (CMS/HCC) 2019   • Right leg pain    • Right ureteral stone 10/01/2019   • SAB (spontaneous ) 1996   • Sepsis due to cellulitis (CMS/HCC) 2002         PAST SURGICAL HISTORY  Past Surgical History:   Procedure Laterality Date   • CHOLECYSTECTOMY N/A 10/10/2003    LAPAROSCOPIC WITH CHOLANGIOGRAM, DR. JAMEY TALAVERA AT [facility]   • COLON RESECTION N/A 2019    Procedure: laparoscopic low anterior colon resection with mobilization of splenic flexure and diverting loop ileostomy: ERAS;  Surgeon: Geronimo Ye MD;  Location: [facility] OR;  Service: General   • COLONOSCOPY W/ POLYPECTOMY N/A 2019    15 MM TUBULOVILLOUS ADENOMA POLYP IN HEPATIC FLEXURE, 20 MMTUBULOVILLOUS ADENOMA WITH HIGH GRADE DYSPLASIA IN RECTOSIGMOID, 4 CM MASS IN MID RECTUM, PATH: INVASIVE MODERATELY DIFFERENTIATED ADENOCARCINOMA, DR. JENNIFER LI AT [facility]   • DILATATION AND EVACUATION N/A    • ENDOSCOPY N/A 2019    LA GRADE A ESOPHAGITIS, GASTRITIS, ALL BIOPSIES BENIGN, DR. JENNIFER LI AT [facility]   • PAP SMEAR N/A 2014   • SIGMOIDOSCOPY N/A 2019    INFILTRATIVE PARTIALLY OBSTRUCTING LARGE RECTAL CANCER, AREA TATOOED, DR. GERONIMO YE AT [facility]   • SIGMOIDOSCOPY N/A 2019    AN ULCERATED PARTIALLY OBSTRUCTING MASS IN MID RECTUM, AREA TATTOOED, DR. GERONIMO YE AT [facility]   • TRANSRECTAL ULTRASOUND N/A 2019    Procedure: ULTRASOUND TRANSRECTAL;  Surgeon: Geronimo Ye MD;  Location: [facility] ENDOSCOPY;  Service: General   • URETEROSCOPY LASER LITHOTRIPSY WITH STENT INSERTION Right 10/2019   • VAGINAL DELIVERY N/A 1998   • VENOUS ACCESS DEVICE (PORT) INSERTION Left 2019    Procedure: INSERTION VENOUS ACCESS DEVICE;  Surgeon: Sj Joseph MD;  Location: [facility] OR [facility];  Service: General   • WISDOM TOOTH EXTRACTION           FAMILY  HISTORY  Family History   Problem Relation Age of Onset   • Hyperlipidemia Mother    • Colon polyps Mother    • Heart disease Mother    • Hypertension Mother    • Factor V Leiden deficiency Mother    • Skin cancer Father         Squamous in 60s   • Heart disease Paternal Grandfather    • No Known Problems Son    • Cancer Paternal Uncle    • Colon cancer Paternal Uncle    • Diabetes Paternal Uncle    • Mental illness Sister         Addiction   • Colon cancer Maternal Uncle    • Malig Hyperthermia Neg Hx          SOCIAL HISTORY  Social History     Socioeconomic History   • Marital status:      Spouse name: Justus   • Number of children: 1   • Years of education: College   • Highest education level: Not on file   Occupational History   • Occupation:      Comment: [employer]     Employer: [employer]   Tobacco Use   • Smoking status: Heavy Tobacco Smoker     Packs/day: 0.50     Years: 24.00     Pack years: 12.00     Types: Electronic Cigarette, Cigarettes   • Smokeless tobacco: Never Used   Substance and Sexual Activity   • Alcohol use: Not Currently     Comment: Rarely   • Drug use: No   • Sexual activity: Defer         ALLERGIES  Patient has no known allergies.        REVIEW OF SYSTEMS  Review of Systems     All systems reviewed and negative except for those discussed in HPI.       PHYSICAL EXAM    I have reviewed the triage vital signs and nursing notes.    ED Triage Vitals   Temp Heart Rate Resp BP SpO2   05/02/20 1337 05/02/20 1337 05/02/20 1337 05/02/20 1354 05/02/20 1337   98.4 °F (36.9 °C) (!) 121 18 149/94 95 %      Temp src Heart Rate Source Patient Position BP Location FiO2 (%)   -- -- -- -- --              Physical Exam  General: Awake, alert, no acute distress, nontoxic, nondiaphoretic  HEENT: Mucous membranes moist, atraumatic, normocephalic, EOMI, no angioedema of the lips, tongue, throat, or drooling. Mild erythema to the cheeks bilaterally.   Neck: Full ROM  Pulm: Symmetric,  non-labored, lungs CTAB without stridor or wheezing  Cardiovascular: Borderline mild regular rhythm tachycardia, normal S1/S2, intact distal pulses  GI: Soft, non-tender, non-distended, no rebound, no guarding, bowel sounds present  MSK: Full ROM, no deformity  Skin: Warm, dry.  Mild edema to the hands/fingers bilaterally right greater than left, palmar erythema noted.  No urticaria noted.  Neuro: Alert and oriented x 3, GCS 15, moving all extremities, no focal deficits  Psych: Calm, cooperative      Surgical mask, surgical cap, and gloves used during this encounter. Patient in surgical mask.      LAB RESULTS  Recent Results (from the past 24 hour(s))   Basic Metabolic Panel    Collection Time: 05/02/20  2:05 PM   Result Value Ref Range    Glucose 129 (H) 65 - 99 mg/dL    BUN 9 6 - 20 mg/dL    Creatinine 0.71 0.57 - 1.00 mg/dL    Sodium 138 136 - 145 mmol/L    Potassium 3.4 (L) 3.5 - 5.2 mmol/L    Chloride 102 98 - 107 mmol/L    CO2 22.2 22.0 - 29.0 mmol/L    Calcium 8.9 8.6 - 10.5 mg/dL    eGFR Non African Amer 91 >60 mL/min/1.73    BUN/Creatinine Ratio 12.7 7.0 - 25.0    Anion Gap 13.8 5.0 - 15.0 mmol/L   CBC Auto Differential    Collection Time: 05/02/20  2:05 PM   Result Value Ref Range    WBC 3.78 3.40 - 10.80 10*3/mm3    RBC 4.15 3.77 - 5.28 10*6/mm3    Hemoglobin 13.4 12.0 - 15.9 g/dL    Hematocrit 39.2 34.0 - 46.6 %    MCV 94.5 79.0 - 97.0 fL    MCH 32.3 26.6 - 33.0 pg    MCHC 34.2 31.5 - 35.7 g/dL    RDW 23.5 (H) 12.3 - 15.4 %    RDW-SD 79.6 (H) 37.0 - 54.0 fl    MPV 9.3 6.0 - 12.0 fL    Platelets 143 140 - 450 10*3/mm3   Manual Differential    Collection Time: 05/02/20  2:05 PM   Result Value Ref Range    Neutrophil % 71.3 42.7 - 76.0 %    Lymphocyte % 25.7 19.6 - 45.3 %    Eosinophil % 3.0 0.3 - 6.2 %    Neutrophils Absolute 2.70 1.70 - 7.00 10*3/mm3    Lymphocytes Absolute 0.97 0.70 - 3.10 10*3/mm3    Eosinophils Absolute 0.11 0.00 - 0.40 10*3/mm3    RBC Morphology Normal Normal    WBC Morphology Normal  Normal    Platelet Morphology Normal Normal       Ordered the above labs and independently reviewed the results.        RADIOLOGY  No Radiology Exams Resulted Within Past 24 Hours        PROCEDURES    Procedures      MEDICATIONS GIVEN IN ER    Medications   diphenhydrAMINE (BENADRYL) injection 25 mg (25 mg Intravenous Given 5/2/20 1411)   famotidine (PEPCID) injection 20 mg (20 mg Intravenous Given 5/2/20 1411)         PROGRESS, DATA ANALYSIS, CONSULTS, AND MEDICAL DECISION MAKING    All labs have been independently reviewed by me.  All radiology studies have been reviewed by me and discussed with radiologist dictating the report.   EKG's independently viewed and interpreted by me.  Discussion below represents my analysis of pertinent findings related to patient's condition, differential diagnosis, treatment plan and final disposition.        ED Course as of May 02 1820   Sat May 02, 2020   1534 Spoke with Dr. Scales (Heme/Onc), she recommends starting her on a Medrol Dosepak with close office follow-up this week.  Likely side effect from 5-FU, do not have any concern for anaphylaxis at this time.  Patient reports no significant change after receiving Benadryl and Pepcid, have advised continuing with those over-the-counter as needed in addition to completing the course of steroids as discussed.  Advised return to the emergency department for worsening symptoms as needed.    [DC]      ED Course User Index  [DC] Yaakov Bradford MD       AS OF 18:20 VITALS:    BP - 141/93  HR - 95  TEMP - 98.4 °F (36.9 °C)  02 SATS - 96%        DIAGNOSIS  Final diagnoses:   Bilateral hand swelling   History of chemotherapy         DISPOSITION  DISCHARGE    Patient discharged in stable condition.    Reviewed implications of results, diagnosis, meds, responsibility to follow up, warning signs and symptoms of possible worsening, potential complications and reasons to return to ER.    Patient/Family voiced understanding of above  instructions.    Discussed plan for discharge, as there is no emergent indication for admission. Patient referred to primary care provider for BP management due to today's BP. Pt/family is agreeable and understands need for follow up and repeat testing.  Pt is aware that discharge does not mean that nothing is wrong but it indicates no emergency is present that requires admission and they must continue care with follow-up as given below or physician of their choice.     FOLLOW-UP  [facility]  [address] [facility] [address] [phone]    As needed, If symptoms worsen    Deepika Vela III, NP-C  [address] [id number] [phone]    Schedule an appointment as soon as possible for a visit   As needed         Medication List      New Prescriptions    methylPREDNISolone 4 MG tablet  Commonly known as:  MEDROL (DAVID)  Take as directed on package instructions.        Changed    rivaroxaban 20 MG tablet  Commonly known as:  Xarelto  Take 1 tablet by mouth Daily. Start after finishing starter pack.  What changed:  additional instructions                     Yaakov Bradford MD  05/02/20 [id number]

## 2020-05-02 NOTE — DISCHARGE INSTRUCTIONS
Take medications as prescribed, follow-up with your cancer physicians early next week for follow-up in the clinic, return to the emergency department for worsening symptoms as needed.  At this time we feel like this is most likely a side effect of your chemotherapy.

## 2020-05-02 NOTE — ED TRIAGE NOTES
"Patient presents to er via private vehicle.  CBC patient on current chemotherapy.  Patient bilateral hands swelling with slight facial swelling.  \"I think I am having a reaction to my chemo\" Patient was placed in face mask during first look triage.  Patient was wearing a face mask throughout encounter.  I wore personal protective equipment throughout the encounter.  Hand hygiene was performed before and after patient encounter.     "

## 2020-05-04 ENCOUNTER — TELEPHONE (OUTPATIENT)
Dept: ONCOLOGY | Facility: CLINIC | Age: 40
End: 2020-05-04

## 2020-05-04 NOTE — TELEPHONE ENCOUNTER
PT called to set up an appt. Pt was hospitalized this weekend and Dr Scales requested she be seem as soon as possible as she was the physician on call.     Best call back number: [phone]

## 2020-05-05 ENCOUNTER — OFFICE VISIT (OUTPATIENT)
Dept: ONCOLOGY | Facility: CLINIC | Age: 40
End: 2020-05-05

## 2020-05-05 ENCOUNTER — LAB (OUTPATIENT)
Dept: LAB | Facility: HOSPITAL | Age: 40
End: 2020-05-05

## 2020-05-05 VITALS
TEMPERATURE: 97.6 F | HEART RATE: 105 BPM | WEIGHT: 216.3 LBS | SYSTOLIC BLOOD PRESSURE: 138 MMHG | OXYGEN SATURATION: 94 % | HEIGHT: 65 IN | RESPIRATION RATE: 16 BRPM | DIASTOLIC BLOOD PRESSURE: 85 MMHG | BODY MASS INDEX: 36.04 KG/M2

## 2020-05-05 DIAGNOSIS — E83.42 HYPOMAGNESEMIA: ICD-10-CM

## 2020-05-05 DIAGNOSIS — C20 ADENOCARCINOMA OF RECTUM (HCC): Primary | ICD-10-CM

## 2020-05-05 LAB
ALBUMIN SERPL-MCNC: 3.7 G/DL (ref 3.5–5.2)
ALBUMIN/GLOB SERPL: 1.1 G/DL (ref 1.1–2.4)
ALP SERPL-CCNC: 123 U/L (ref 38–116)
ALT SERPL W P-5'-P-CCNC: 36 U/L (ref 0–33)
ANION GAP SERPL CALCULATED.3IONS-SCNC: 10.5 MMOL/L (ref 5–15)
AST SERPL-CCNC: 27 U/L (ref 0–32)
BILIRUB SERPL-MCNC: 0.3 MG/DL (ref 0.2–1.2)
BUN BLD-MCNC: 13 MG/DL (ref 6–20)
BUN/CREAT SERPL: 19.4 (ref 7.3–30)
CALCIUM SPEC-SCNC: 9 MG/DL (ref 8.5–10.2)
CHLORIDE SERPL-SCNC: 104 MMOL/L (ref 98–107)
CO2 SERPL-SCNC: 23.5 MMOL/L (ref 22–29)
CREAT BLD-MCNC: 0.67 MG/DL (ref 0.6–1.1)
GFR SERPL CREATININE-BSD FRML MDRD: 97 ML/MIN/1.73
GLOBULIN UR ELPH-MCNC: 3.3 GM/DL (ref 1.8–3.5)
GLUCOSE BLD-MCNC: 139 MG/DL (ref 74–124)
MAGNESIUM SERPL-MCNC: 2 MG/DL (ref 1.8–2.5)
POTASSIUM BLD-SCNC: 4.7 MMOL/L (ref 3.5–4.7)
PROT SERPL-MCNC: 7 G/DL (ref 6.3–8)
SODIUM BLD-SCNC: 138 MMOL/L (ref 134–145)

## 2020-05-05 PROCEDURE — 83735 ASSAY OF MAGNESIUM: CPT

## 2020-05-05 PROCEDURE — 80053 COMPREHEN METABOLIC PANEL: CPT | Performed by: NURSE PRACTITIONER

## 2020-05-05 PROCEDURE — 99214 OFFICE O/P EST MOD 30 MIN: CPT | Performed by: NURSE PRACTITIONER

## 2020-05-05 PROCEDURE — 36415 COLL VENOUS BLD VENIPUNCTURE: CPT

## 2020-05-05 RX ORDER — DEXAMETHASONE SODIUM PHOSPHATE 1 MG/ML
SOLUTION/ DROPS OPHTHALMIC
COMMUNITY
Start: 2020-04-29 | End: 2020-05-27 | Stop reason: SDUPTHER

## 2020-05-05 NOTE — PROGRESS NOTES
REASON FOR FOLLOW UP:     1. Newly diagnosed rectal cancer, rectal ultrasound examination reported T3N0 disease without lymphadenopathy. She does have small pulmonary nodule 6-7 mm and 2 mm with indeterminate feature. There is no solid evidence of metastatic disease otherwise. Patient has stage IIA (T3N0M0) disease.  2. The patient is heterozygous factor V Leiden, currently on prophylactic anticoagulation with Lovenox 40 mg daily given her increased risk of thrombosis with MediPort and GI malignancy.   3.  The 8 mm hypermetabolic right upper lobe pulmonary nodule is suspicious for metastatic as well.    4.  Patient had a PowerPort placement on the left upper chest by Dr. Joseph on 8/9/2019.  5.  Patient was started on concurrent infusional 5-FU chemoradiation therapy on 8/12/2019, with planned complete surgical resection and further adjuvant chemotherapy with intention to cure the disease.   6. Patient underwent surgical resection of the primary rectal cancer by Dr. Ye on 12/2/2019.  Pathology evaluation reported residual T3N1 disease stage IIIb.  7. Diarrhea related to therapy and radiation.   8. Patient was started cycle 1 day 1 of adjuvant FOLFOX 6 on 1/23/2020.  9.  On 2/5/2020 FOLFOX 6 cycle 2 delayed secondary to neutropenia.  Patient was given 3 days of Granix injection.  After cycle #2 we planned 3 days of Granix with each cycle.    10.  Patient also intolerant of oral iron.  Patient received 2 doses of IV injectafer on 02/05/2020 and 02/12/2020.   10. 02/12/2020 Proceed with cycle #2 of FOLFOX 6 with 3 days of Granix.  11.  3/11/2020 cycle 4 postponed secondary to abdominal pain and occasional low-grade fevers.  CT scans ordered.  12.  Cycle #4 resumed after CT scan revealed no evidence of disease.  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 Ye. patient has no fever.  Continue to observe.   13.  Patient seen in the emergency  department for what was suspected to be an allergic reaction.  She called our on-call service on Saturday explaining that she was experiencing hand and face swelling.  She was instructed to proceed to the emergency department at which time she was assessed and prescribed a Medrol Dosepak.  She had just completed her 5-FU and was unhooked on Friday, 5/3/2020.  Her symptoms have improved.    HISTORY OF PRESENT ILLNESS:  The patient is a 39 y.o. female with the above medical history here today for follow-up after  A recent visit to the emergency department for what was suspected to be an allergic reaction.  She called our on-call service on Saturday with reports of hand and face swelling.  She was instructed to proceed to the emergency department at which time she was assessed and prescribed a Medrol Dosepak.  She had just completed her 5-FU and was unhooked on Friday, 5/3/2020.  Her symptoms have improved.  She does report persistent hyperpigmentation and mild swelling of the palms of the hands but this is much improved.  It was her right hand specifically that was swollen.  Her facial swelling has resolved.  She continues on Cefdinir nd since has 1 day remaining, she was prescribed cefdinir for a UTI requiring hospitalization at the end of April.  Reports no new symptoms.  Her labs are stable.  She is scheduled for treatment again.    Past Medical History:   Diagnosis Date   • Abnormal Pap smear of cervix 02/02/1998    JULIUS I   • Anemia in pregnancy    • Anxiety    • Cervical lymphadenitis 06/06/2012    SEEN AT [facility] ER   • Chronic diarrhea    • Colon polyps     FOLLOWED BY DR. GERONIMO ESPARZA   • Depression    • Factor V Leiden (CMS/[facility])     DX 8-2-2019   • GERD (gastroesophageal reflux disease)    • Heart murmur     ?   • Hematochezia    • Hemorrhoids    • History of chemotherapy 2019    FOLLOWED BY DR. ALEXANDRU HUNT   • History of gestational diabetes    • History of pre-eclampsia    • History of radiation therapy 2019     FOLLOWED BY DR. JAVON LEWIS   • History of TB skin testing 2009    TB Skin Test   • HPV in female    • IBS (irritable bowel syndrome)    • Infected insect bite of neck 2016    SEEN AT [facility]   • Irritable bowel syndrome    • Kidney stones 2007    SEEN AT [facility] ER   • Lumbar disc disease    • On anticoagulant therapy    • PIH (pregnancy induced hypertension)    • Pulmonary embolism (CMS/HCC) 2019    ADMITTED TO [facility]   • Rectal cancer (CMS/HCC) 2019    INVASIVE MODERATELY DIFFERENTIATED ADENOCARCINOMA, CHEMO AND XRT FINISHED 2019   • Right leg pain    • Right ureteral stone 10/01/2019    SEEN AT [facility] ER   • SAB (spontaneous ) 1996     A2-1 INDUCED   • Sepsis due to cellulitis (CMS/HCC) 2002    LEFT GREAT TOE, ADMITTED TO [facility]     Past Surgical History:   Procedure Laterality Date   • CHOLECYSTECTOMY N/A 10/10/2003    LAPAROSCOPIC WITH CHOLANGIOGRAM, DR. JAMEY TALAVERA AT [facility]   • COLON RESECTION N/A 2019    Procedure: laparoscopic low anterior colon resection with mobilization of splenic flexure and diverting loop ileostomy: ERAS;  Surgeon: Geronimo Ye MD;  Location: [facility];  Service: General   • COLONOSCOPY W/ POLYPECTOMY N/A 2019    15 MM TUBULOVILLOUS ADENOMA POLYP IN HEPATIC FLEXURE, 20 MMTUBULOVILLOUS ADENOMA WITH HIGH GRADE DYSPLASIA IN RECTOSIGMOID, 4 CM MASS IN MID RECTUM, PATH: INVASIVE MODERATELY DIFFERENTIATED ADENOCARCINOMA, DR. JENNIFER LI AT [facility]   • DILATATION AND EVACUATION N/A    • ENDOSCOPY N/A 2019    LA GRADE A ESOPHAGITIS, GASTRITIS, ALL BIOPSIES BENIGN, DR. JENNIFER LI AT [facility]   • PAP SMEAR N/A 2014   • SIGMOIDOSCOPY N/A 2019    INFILTRATIVE PARTIALLY OBSTRUCTING LARGE RECTAL CANCER, AREA TATOOED, DR. GERONIMO YE AT [facility]   • SIGMOIDOSCOPY N/A 2019    AN ULCERATED PARTIALLY OBSTRUCTING MASS IN MID RECTUM, AREA TATTOOED, DR. GERONIMO YE AT [facility]    • TRANSRECTAL ULTRASOUND N/A 7/24/2019    Procedure: ULTRASOUND TRANSRECTAL;  Surgeon: Padmaja Ye MD;  Location: [facility] ENDOSCOPY;  Service: General   • URETEROSCOPY LASER LITHOTRIPSY WITH STENT INSERTION Right 10/2019   • VAGINAL DELIVERY N/A 09/18/1998   • VENOUS ACCESS DEVICE (PORT) INSERTION Left 8/9/2019    Procedure: INSERTION VENOUS ACCESS DEVICE;  Surgeon: Sj Joseph MD;  Location: [facility] OR [facility];  Service: General   • WISDOM TOOTH EXTRACTION  1993       HEMATOLOGIC/ONCOLOGIC HISTORY:      The patient reports she has intermittent rectal bleeding and urgency, mucous with her stool, starting sometime in 2016. At that time she was referred to GI service, and was diagnosed of irritable bowel syndrome. Nevertheless she had worsening urgency for bowel movement, and had a couple of incidents losing control of stool. She was recently seen by Roland Thorpe MD again and had colonoscopy and EGD exam on 07/08/2019. She was found having a circumferential rectal mass. According to the procedure note, the patient had a fungating circumferential bleeding 4 cm mass in the middle rectum, at distance between 13 cm and 17 cm from the anus. Mass was causing partial obstruction. There were also colon polyps found at the hepatic flexure and also at the rectosigmoid junction 23 cm from the anus. Both were resected and retrieved. EGD examination reported grade A esophagitis at the GE junction and patchy discontinuous erythema and aggravation of the mucosa without bleeding in the stomach body. There is normal mucosa of the duodenum. Pathology evaluation from this procedure reported moderately differentiated adenocarcinoma involving the rectal mass. The rectal sigmoid junction polyp was tubular/tubulovillous adenoma with high grade dysplasia negative for invasive malignancy. The hepatic flexure polyp was also tubular/tubulovillous adenoma negative for high grade dysplasia or malignancy. The biopsy from the esophagus  reports squamocolumnar mucosa with inflammatory changes suggestive of mild reflux esophagitis, negative for interstitial metaplasia. Gastric biopsy was benign and duodenal biopsy was also benign. There is no mention of H-pylori.     Patient was subsequently referred to colorectal surgeon Padmaja Ye MD for further evaluation. The patient had CT scan examination for chest, abdomen and pelvis requested by Dr. Ye and were done on 07/13/2019. The study reported no evidence of lymphadenopathy in the abdomen and pelvis. There was wall thickening of the rectosigmoid junction. Normal spleen, pancreas, adrenal glands and kidneys. There was fatty liver infiltration but no focal lymphatic lesions. There was a small 6-7 mm noncalcified nodule in the right upper lobe and a tiny 3 mm subpleural nodule in the right middle lobe. No mediastinal or hilar lymphadenopathy.     Dr. Ye performed staging rectal ultrasound examination. This study reported tumor penetrating through the muscularis propria as T3 disease; there were no lymph nodes identified.    She had a hospitalization in late September 2019 because of newly discovered pulmonary emboli.  She was on prophylactic Lovenox prior to the incident of PE.  Now she is on full dose Xarelto anticoagulation.  Patient reports no further chest pain dyspnea.  She denies bleeding or bruising.  During her hospitalization, she was seen by Dr. Ye, who plans to have surgery 8 to 12 weeks after finishing radiation therapy.  She finished her radiation on 9/19/2019.     I noticed patient also presented to the emergency room on 10/1/2019 complaining of right flank area pain.  I reviewed the images studies and indeed she has a very small 1 to 2 mm obstructing kidney stone in the UV junction.  Patient is still symptomatic with some pains and dysuria.  She denies fever sweating or chills.    Laboratory study on 10/7/2019 reported normal CBC including hemoglobin 13.1, platelets 301,000, WBC  6170 and ANC 4900 lymphocytes 590 monocytes 480.      The nurse reported malfunction of port-a-catheter on 10/7/2019.  Port study on 10/8/2019 showed fibrin sheath around the distal aspect of the Mediport catheter in the SVC. This does not appear to hinder injection, but does prevent aspiration at this time.    Patient underwent surgical resection of the colon on 12/2/2019 with Dr. Ye.  Pathology evaluation reported residual T3 disease, also 1 out of 14 lymph nodes positive for malignancy.  So this patient in either had at least stage IIIb disease (T3 N1 M0?).      Adjuvant chemotherapy FOLFOX 6 will be started on 1/22/2020.  Laboratory study reported iron saturation 10%, free iron 31 TIBC 319 and ferritin 168.  Her hemoglobin was 11.8, WBC 5600, and platelets 347,000.    Patient was here on 02/12/2020 for cycle 2 of her FOLFOX.  This is delayed x1 week secondary to neutropenia.  The patient ultimately received 3 days worth of Neupogen with recovery of her blood counts.  Of note, the patient struggled with significant nausea without any episodes of vomiting following cycle #1 of chemotherapy resulting in significant weight loss.  She is up 12 pounds over the last week as her appetite has normalized.  We will add Emend to her care plan.    She is having several loose stools per her colostomy and has been hesitant to take Imodium due to prior history of constipation.  I encouraged her to try this with a maximum of 8 tablets/day.  She denies any infectious symptoms including fevers or chills.  No excess bleeding or bruising noted.  She had the expected cold sensitivity related to the Oxaliplatin for about 3 days following treatment.    Labs from 02/12/2020 demonstrated total white blood cell count of 5.14, ANC of 3.06, hemoglobin of 11.2, platelets of 211,000.  She was found to be iron deficient last week and is intolerant to oral iron secondary to GI distress.  For this reason, she initiated IV iron therapy with  Injectafer last week.  She had received her second dose 02/12/2020    Patient has normalized hemoglobin 12.2 on 2/26/2020.    MEDICATIONS    Current Outpatient Medications:   •  clonazePAM (KlonoPIN) 1 MG tablet, Take 1 tablet by mouth 3 (Three) Times a Day As Needed for Anxiety or Seizures., Disp: 90 tablet, Rfl: 0  •  dexamethasone (DECADRON) 0.1 % ophthalmic suspension, Administer 1 drop to both eyes Every 6 (Six) Hours As Needed (eye irritation/tearing)., Disp: 5 mL, Rfl: 3  •  escitalopram (LEXAPRO) 20 MG tablet, Take 1 tablet by mouth Daily., Disp: 90 tablet, Rfl: 3  •  famotidine (PEPCID) 20 MG tablet, TAKE 1 TABLET BY MOUTH TWICE A DAY, Disp: 60 tablet, Rfl: 1  •  Filgrastim-aafi (NIVESTYM) 480 MCG/0.8ML solution prefilled syringe, Inject 480 mcg as directed Daily. For 3 days after each chemotherapy cycle. Cycles are every 2 weeks., Disp: 4.8 mL, Rfl: 6  •  HYDROcodone-acetaminophen (NORCO) 7.5-325 MG per tablet, Take 1 tablet by mouth Every 4 (Four) Hours As Needed for Moderate Pain ., Disp: 180 tablet, Rfl: 0  •  Loratadine (CLARITIN) 10 MG capsule, Take  by mouth., Disp: , Rfl:   •  methylPREDNISolone (MEDROL, DAVID,) 4 MG tablet, Take as directed on package instructions., Disp: 21 each, Rfl: 0  •  ondansetron (ZOFRAN) 8 MG tablet, Take 1 tablet by mouth 3 (Three) Times a Day As Needed for Nausea or Vomiting., Disp: 60 tablet, Rfl: 5  •  potassium chloride (K-DUR,KLOR-CON) 20 MEQ CR tablet, Take 1 tablet by mouth Daily., Disp: 30 tablet, Rfl: 2  •  prochlorperazine (COMPAZINE) 10 MG tablet, Take 1 tablet by mouth Every 6 (Six) Hours As Needed for Nausea or Vomiting., Disp: 30 tablet, Rfl: 2  •  rivaroxaban (XARELTO) 20 MG tablet, Take 1 tablet by mouth Daily. Start after finishing starter pack. (Patient taking differently: Take 20 mg by mouth Daily. PT HOLDING 48 HOURS PRIOR TO SURGERY), Disp: 30 tablet, Rfl: 11  •  dexamethasone (DECADRON) 0.1 % ophthalmic solution, ADMINISTER 1 DROP TO BOTH EYES EVERY 6  (SIX) HOURS AS NEEDED (EYE IRRITATION/TEARING)., Disp: , Rfl:     ALLERGIES:   No Known Allergies    SOCIAL HISTORY:       Social History     Tobacco Use   • Smoking status: Heavy Tobacco Smoker     Packs/day: 0.50     Years: 24.00     Pack years: 12.00     Types: Electronic Cigarette, Cigarettes   • Smokeless tobacco: Never Used   Substance Use Topics   • Alcohol use: Not Currently     Comment: Rarely   • Drug use: No         FAMILY HISTORY:   Mother has positive factor V Leiden mutation, although she did not have thrombosis, mother also is coronary disease with stenting, she also is occasional bruising.  Maternal grandmother had DVT, she had multiple surgical procedures.  Patient mother's half-brother had metastatic colon cancer at diagnosis in his 50s.  Maternal great aunt has breast cancer.  Patient will follow his skin cancer in his 60s, exclusive.    REVIEW OF SYSTEMS:  Review of Systems   Constitutional: Positive for fatigue. Negative for appetite change, chills, diaphoresis and fever.   HENT: Negative for congestion, hearing loss, mouth sores and trouble swallowing.    Eyes: Negative for photophobia and visual disturbance.   Respiratory: Negative for cough, chest tightness and shortness of breath.    Cardiovascular: Negative for chest pain, palpitations and leg swelling.   Gastrointestinal: Negative for abdominal distention, abdominal pain, anal bleeding, constipation, rectal pain and vomiting.   Endocrine: Negative for heat intolerance.   Genitourinary: Negative for frequency, hematuria and urgency.        See HPI    Musculoskeletal: Negative for arthralgias, back pain and joint swelling.   Skin: Negative for rash and wound.        See hpi   Allergic/Immunologic: Positive for immunocompromised state (Secondary to adjuvant chemotherapy). Negative for environmental allergies and food allergies.   Neurological: Positive for numbness (cold exposure related to treatment). Negative for dizziness, speech  "difficulty and headaches.   Hematological: Negative for adenopathy. Does not bruise/bleed easily.   Psychiatric/Behavioral: Negative for agitation, behavioral problems, confusion and suicidal ideas.          Vitals:    05/05/20 1009   BP: 138/85   Pulse: 105   Resp: 16   Temp: 97.6 °F (36.4 °C)   TempSrc: Oral   SpO2: 94%   Weight: 98.1 kg (216 lb 4.8 oz)   Height: 166 cm (65.35\")   PainSc: 0-No pain     Current Status 5/5/2020   ECOG score 0     PHYSICAL EXAM:    GENERAL:  Well-developed, well-nourished  female in no acute distress.  SKIN: Hyperpigmentation noted of the hands bilaterally.  Mild symmetrical swelling.  No heat or tenderness.  No peeling noted  EYES:  Pupils equal, round and reactive to light.  EOMs intact.   Conjunctive are normal.  EARS:  Hearing intact.  NOSE: Patient with scant nosebleeds noted.  CHEST: Regular respiratory effort  CARDIAC:  Regular rate   ABDOMEN: No tenderness.  No mass palpable.  Bowel sounds present.  Ostomy in place with small amount of liquid output.      EXTREMITIES:  No clubbing, cyanosis or edema.  NEUROLOGICAL:  Cranial Nerves II-XII grossly intact.  No focal neurological deficits.  PSYCHIATRIC:  Normal affect and mood.        RECENT LABS:  Results from last 7 days   Lab Units 05/02/20  1405 04/29/20  0746   WBC 10*3/mm3 3.78 3.08*   NEUTROS ABS 10*3/mm3 2.70 1.76   LYMPHS ABS 10*3/mm3 0.97  --    HEMOGLOBIN g/dL 13.4 12.7   HEMATOCRIT % 39.2 38.5   PLATELETS 10*3/mm3 143 117*     Results from last 7 days   Lab Units 05/02/20  1405 04/29/20  0746   SODIUM mmol/L 138 140   POTASSIUM mmol/L 3.4* 3.2*   CHLORIDE mmol/L 102 102   CO2 mmol/L 22.2 24.3   BUN mg/dL 9 8   CREATININE mg/dL 0.71 0.76   CALCIUM mg/dL 8.9 9.2   ALBUMIN g/dL  --  3.40*   BILIRUBIN mg/dL  --  0.2   ALK PHOS U/L  --  95   ALT (SGPT) U/L  --  29   AST (SGOT) U/L  --  26   GLUCOSE mg/dL 129* 107         Assessment/Plan    1.  Newly diagnosed rectal cancer, rectal ultrasound examination reported " T3N0 disease without lymphadenopathy. CT scan of chest, abdomen and pelvis reported no lymphadenopathy in the abdomen, pelvis or chest. She does have small pulmonary nodule 6-7 mm and 2 mm with indeterminate feature. There is no solid evidence of metastatic disease otherwise.   · Based on the CT scan and rectal ultrasound imaging studies, this patient had stage IIA (T3N0M0) disease.    · She had PET scan examination on 8/8/2019 which reported a hypermetabolic right upper lobe pulmonary nodule 6 mm with SUV 5.6.  This is suspicious for metastatic disease.  This patient may have stage IV disease.   · She initiated concurrent radiation with continuous 5-FU on 8/12/2019.  · Patient finished concurrent chemoradiation therapy.  · Patient underwent surgical resection of the rectal tumor and diverting loop ileostomy on 12/2/2019 with Dr. Ye.  Pathology evaluation reported residual T3 disease, with 1 out of 14 lymph nodes positive for malignancy.  Certainly this patient has at least stage IIIb rectal cancer (T3 N1 M0?)  · On 1/22/2020 adjuvant chemotherapy FOLFOX 6 regimen initiated.    · On 2/5/2022 cycle #2 was delayed secondary to neutropenia.  She was given 3 days of Granix.   · Emend added with cycle 3.  With improved nausea control  · Continuing home Granix x3 days following 5-FU disconnect  · 3/11/2020 due for cycle 4, however, she is experiencing progressive abdominal pain and occasional fevers.   · CT scan performed on 3/13/2020 reveals no evidence of progressive or recurrent disease.  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  · Patient hospitalized 4/24-4/26/20 after cycle 5 of chemotherapy with acute UTI.  CT abdomen/pelvis noting fluid collection in the presacral region having diminished in size compared to CT dated 3/13/2020.  Patient was evaluated by Dr. Ye while in the hospital with further plans to evaluate possible colovaginal fistula following completion of chemotherapy.  Patient did respond to IV  "antibiotics and discharged home on oral cefdinir.  · 4/29/2020 cycle 6 of FOLFOX.  Urinary symptoms have resolved   · Patient seen in the emergency department for what was suspected to be an allergic reaction.  She called our service on Saturday explaining that she was experiencing hand and face swelling.  She was instructed to proceed to the emergency department at which time she was assessed and prescribed a Medrol Dosepak.  She had just completed her 5-FU and was unhooked on Friday, 5/3/2020.  Her symptoms have resolved in the office today on assessment, 5/5/2020      2 .  Pulmonary emboli with background of positive factor V Leiden mutation   · The patient has heterozygous factor V Leiden mutation and therefore was on low-dose anticoagulation with Lovenox, 40 mg daily given her increased risk of thrombosis with MediPort and GI malignancy.    · Nevertheless this patient was found to having pulmonary emboli on 9/20/2019 despite low-dose Lovenox.  She had a brief hospitalization in late September 2019, she was started on full dose Xarelto anticoagulation per protocol.    · The patient continues on Xarelto and is tolerating this well.  Is having some issues with slight nosebleeds as outlined above.  Patient to call with worsening.     3.  Loose, frequent stools:   · Patient is s/p surgical resection of the rectum tumor and diverting loop ileostomy on 12/2/2019 with Dr. Ye.  · She reports having liquid stools in her colostomy bag.  She reports her liquid stools typically appear \"clear mustard yellow\" or dark.  · On 4/1/2020 patient was recommended to start on Bentyl    · Patient notes output has stabilized and is more pasty which is baseline for her.    4. Pelvic pain: Related to previous radiation therapy and the surgery for resection of the primary rectal cancer.  Stable see above.    5. Hypokalemia: .  · Potassium was 4.7 today.    6.  This patient also has strong family history for malignancy the patient had " appointment with genetic counseling on September 3, 2019. NF1 [id number] 3C >T.    7.  Nausea: This does seem to be multifactorial with a large part due to anxiety.  The patient does continue on Lexapro at 20 mg daily as well as Klonopin 0.5 mg once daily as needed.      · Emend was added with cycle 3 with much better management of nausea  · She can continue to utilize Zofran and Compazine as needed  · Is not experiencing nausea today.    8.  Mild anemia, improved since her surgery.  She also has a history of iron deficiency.  Iron studies reveal a saturation of just 10%.  She was started on oral iron but was unable to tolerate due to GI side effects.   · Status post Injectafer 2/5/2020 and 2/12/2020   · Hemoglobin was within normal limits on 4/1/2020 at 13.1  · Hemoglobin today is within normal limits at 13.7  · Hemoglobin 13.4 on 5/2/2020    9.  Neutropenia secondary to chemotherapy.  · As noted above, the patient will receive 3 days of Granix begin on day 4 of each cycle of chemotherapy.  We obtained approval for self administration at home.  This started following cycle 3 chemotherapy  · Total white count on 4/1/2020 was 3930, ANC 2540  · Total white count today on 5/2/2020 was 3.78 with an ANC of 2.7.    10.  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    · Jaison Ye and Patrick spoke regarding CT scan and felt OK to proceed with treatment.    · Patient saw Dr. Ye during brief hospitalization 4/24-4/26 with repeat CT scan showing improvement of fluid collection in the presacral region.  Plan to follow-up on possible fistula following completion of chemotherapy.    11.  Cold sensitivity secondary to oxaliplatin  · Lasting 10 days to 2 weeks with numbness almost constantly.  · Patient began B complex on 4/1/2020  · Continue B complex      12.  Hypomagnesemia.  · Magnesium 2.0 today.    13.  Peripheral neuropathy secondary to chemotherapy.  This is mild involving bilateral hands.  However her cholecystectomy actually last  longer.  She is aware not to drink or eat cold food/beverage nor touch cold objects.    14.  Hyperlacrimation and mild scleral irritation related to 5-FU.  He has been taking steroid eyedrops prescribed by Daksha Cruz last visit.  This has improved her hyperlacrimation.    15.  Mild epistaxis.  Patient's platelet count did drop to 73,000 during hospitalization and has improved to 117,000 today.  Patient advised to monitor nosebleeds and call with worsening symptoms.  She will continue her Xarelto for now.  She has not experienced any nosebleeds in the past several days.    16.  Hypersensitivity/allergic reaction possibly to her most recent cycle of 5-FU.  She was seen in the emergency department and prescribed a Medrol Dosepak with resolution of symptoms.    PLAN:  I have reviewed labs with the patient today.  We also reviewed records from her emergency room visit.  She is currently scheduled to return on 5/13/2020 to see Dr. Nguyen in anticipation of her next cycle, further treatment will need to be discussed at this time.  She may require omission of 5-FU completely or dose reductions.    We will continue her Medrol Dosepak as prescribed.    She will continue Xarelto as prescribed.    I have asked the patient to call the office should she experience new or worsening symptoms.

## 2020-05-13 ENCOUNTER — TELEPHONE (OUTPATIENT)
Dept: ONCOLOGY | Facility: CLINIC | Age: 40
End: 2020-05-13

## 2020-05-13 ENCOUNTER — OFFICE VISIT (OUTPATIENT)
Dept: ONCOLOGY | Facility: CLINIC | Age: 40
End: 2020-05-13

## 2020-05-13 ENCOUNTER — INFUSION (OUTPATIENT)
Dept: ONCOLOGY | Facility: HOSPITAL | Age: 40
End: 2020-05-13

## 2020-05-13 VITALS
TEMPERATURE: 98 F | HEIGHT: 65 IN | DIASTOLIC BLOOD PRESSURE: 89 MMHG | RESPIRATION RATE: 16 BRPM | SYSTOLIC BLOOD PRESSURE: 122 MMHG | WEIGHT: 214.2 LBS | HEART RATE: 114 BPM | OXYGEN SATURATION: 95 % | BODY MASS INDEX: 35.69 KG/M2

## 2020-05-13 DIAGNOSIS — D68.51 HETEROZYGOUS FACTOR V LEIDEN MUTATION (HCC): ICD-10-CM

## 2020-05-13 DIAGNOSIS — I26.99 OTHER PULMONARY EMBOLISM WITHOUT ACUTE COR PULMONALE, UNSPECIFIED CHRONICITY (HCC): ICD-10-CM

## 2020-05-13 DIAGNOSIS — C20 ADENOCARCINOMA OF RECTUM (HCC): Primary | ICD-10-CM

## 2020-05-13 DIAGNOSIS — G62.0 DRUG-INDUCED PERIPHERAL NEUROPATHY (HCC): ICD-10-CM

## 2020-05-13 DIAGNOSIS — D69.59 CHEMOTHERAPY-INDUCED THROMBOCYTOPENIA: ICD-10-CM

## 2020-05-13 DIAGNOSIS — L27.1 HAND FOOT SYNDROME: Primary | ICD-10-CM

## 2020-05-13 DIAGNOSIS — C20 ADENOCARCINOMA OF RECTUM (HCC): ICD-10-CM

## 2020-05-13 DIAGNOSIS — T45.1X5A ADVERSE EFFECT OF ANTINEOPLASTIC AND IMMUNOSUPPRESSIVE DRUGS, INITIAL ENCOUNTER: ICD-10-CM

## 2020-05-13 DIAGNOSIS — R91.1 PULMONARY NODULE, RIGHT: ICD-10-CM

## 2020-05-13 DIAGNOSIS — T45.1X5A CHEMOTHERAPY-INDUCED THROMBOCYTOPENIA: ICD-10-CM

## 2020-05-13 LAB
ALBUMIN SERPL-MCNC: 3.5 G/DL (ref 3.5–5.2)
ALBUMIN/GLOB SERPL: 1.1 G/DL (ref 1.1–2.4)
ALP SERPL-CCNC: 113 U/L (ref 38–116)
ALT SERPL W P-5'-P-CCNC: 44 U/L (ref 0–33)
ANION GAP SERPL CALCULATED.3IONS-SCNC: 11 MMOL/L (ref 5–15)
AST SERPL-CCNC: 30 U/L (ref 0–32)
BASOPHILS # BLD AUTO: 0.02 10*3/MM3 (ref 0–0.2)
BASOPHILS NFR BLD AUTO: 0.5 % (ref 0–1.5)
BILIRUB SERPL-MCNC: 0.2 MG/DL (ref 0.2–1.2)
BUN BLD-MCNC: 9 MG/DL (ref 6–20)
BUN/CREAT SERPL: 12.7 (ref 7.3–30)
CALCIUM SPEC-SCNC: 9.3 MG/DL (ref 8.5–10.2)
CHLORIDE SERPL-SCNC: 103 MMOL/L (ref 98–107)
CO2 SERPL-SCNC: 26 MMOL/L (ref 22–29)
CREAT BLD-MCNC: 0.71 MG/DL (ref 0.6–1.1)
DEPRECATED RDW RBC AUTO: 79.4 FL (ref 37–54)
EOSINOPHIL # BLD AUTO: 0.1 10*3/MM3 (ref 0–0.4)
EOSINOPHIL NFR BLD AUTO: 2.4 % (ref 0.3–6.2)
ERYTHROCYTE [DISTWIDTH] IN BLOOD BY AUTOMATED COUNT: 21.4 % (ref 12.3–15.4)
GFR SERPL CREATININE-BSD FRML MDRD: 91 ML/MIN/1.73
GLOBULIN UR ELPH-MCNC: 3.3 GM/DL (ref 1.8–3.5)
GLUCOSE BLD-MCNC: 135 MG/DL (ref 74–124)
HCT VFR BLD AUTO: 41.4 % (ref 34–46.6)
HGB BLD-MCNC: 13.4 G/DL (ref 12–15.9)
IMM GRANULOCYTES # BLD AUTO: 0.02 10*3/MM3 (ref 0–0.05)
IMM GRANULOCYTES NFR BLD AUTO: 0.5 % (ref 0–0.5)
LYMPHOCYTES # BLD AUTO: 0.86 10*3/MM3 (ref 0.7–3.1)
LYMPHOCYTES NFR BLD AUTO: 20.8 % (ref 19.6–45.3)
MAGNESIUM SERPL-MCNC: 1.7 MG/DL (ref 1.8–2.5)
MCH RBC QN AUTO: 32.5 PG (ref 26.6–33)
MCHC RBC AUTO-ENTMCNC: 32.4 G/DL (ref 31.5–35.7)
MCV RBC AUTO: 100.5 FL (ref 79–97)
MONOCYTES # BLD AUTO: 0.42 10*3/MM3 (ref 0.1–0.9)
MONOCYTES NFR BLD AUTO: 10.1 % (ref 5–12)
NEUTROPHILS # BLD AUTO: 2.72 10*3/MM3 (ref 1.7–7)
NEUTROPHILS NFR BLD AUTO: 65.7 % (ref 42.7–76)
NRBC BLD AUTO-RTO: 0 /100 WBC (ref 0–0.2)
PLATELET # BLD AUTO: 123 10*3/MM3 (ref 140–450)
PMV BLD AUTO: 9.1 FL (ref 6–12)
POTASSIUM BLD-SCNC: 3.8 MMOL/L (ref 3.5–4.7)
PROT SERPL-MCNC: 6.8 G/DL (ref 6.3–8)
RBC # BLD AUTO: 4.12 10*6/MM3 (ref 3.77–5.28)
SODIUM BLD-SCNC: 140 MMOL/L (ref 134–145)
WBC NRBC COR # BLD: 4.14 10*3/MM3 (ref 3.4–10.8)

## 2020-05-13 PROCEDURE — 25010000002 FOSAPREPITANT PER 1 MG: Performed by: INTERNAL MEDICINE

## 2020-05-13 PROCEDURE — 96367 TX/PROPH/DG ADDL SEQ IV INF: CPT

## 2020-05-13 PROCEDURE — 25010000002 FLUOROURACIL PER 500 MG: Performed by: INTERNAL MEDICINE

## 2020-05-13 PROCEDURE — 96368 THER/DIAG CONCURRENT INF: CPT

## 2020-05-13 PROCEDURE — 25010000002 DEXAMETHASONE SODIUM PHOSPHATE 100 MG/10ML SOLUTION: Performed by: INTERNAL MEDICINE

## 2020-05-13 PROCEDURE — 25010000002 OXALIPLATIN PER 0.5 MG: Performed by: INTERNAL MEDICINE

## 2020-05-13 PROCEDURE — 99215 OFFICE O/P EST HI 40 MIN: CPT | Performed by: INTERNAL MEDICINE

## 2020-05-13 PROCEDURE — 96375 TX/PRO/DX INJ NEW DRUG ADDON: CPT

## 2020-05-13 PROCEDURE — 80053 COMPREHEN METABOLIC PANEL: CPT

## 2020-05-13 PROCEDURE — 25010000002 PALONOSETRON PER 25 MCG: Performed by: INTERNAL MEDICINE

## 2020-05-13 PROCEDURE — 25010000002 LEUCOVORIN CALCIUM PER 50 MG: Performed by: INTERNAL MEDICINE

## 2020-05-13 PROCEDURE — 83735 ASSAY OF MAGNESIUM: CPT

## 2020-05-13 PROCEDURE — 96416 CHEMO PROLONG INFUSE W/PUMP: CPT

## 2020-05-13 PROCEDURE — 96413 CHEMO IV INFUSION 1 HR: CPT

## 2020-05-13 PROCEDURE — 96415 CHEMO IV INFUSION ADDL HR: CPT

## 2020-05-13 PROCEDURE — 85025 COMPLETE CBC W/AUTO DIFF WBC: CPT

## 2020-05-13 RX ORDER — DEXTROSE MONOHYDRATE 50 MG/ML
250 INJECTION, SOLUTION INTRAVENOUS ONCE
Status: CANCELLED | OUTPATIENT
Start: 2020-05-13

## 2020-05-13 RX ORDER — DEXTROSE MONOHYDRATE 50 MG/ML
250 INJECTION, SOLUTION INTRAVENOUS ONCE
Status: COMPLETED | OUTPATIENT
Start: 2020-05-13 | End: 2020-05-13

## 2020-05-13 RX ORDER — DIPHENHYDRAMINE HYDROCHLORIDE 50 MG/ML
50 INJECTION INTRAMUSCULAR; INTRAVENOUS AS NEEDED
Status: CANCELLED | OUTPATIENT
Start: 2020-05-13

## 2020-05-13 RX ORDER — FAMOTIDINE 10 MG/ML
20 INJECTION, SOLUTION INTRAVENOUS AS NEEDED
Status: CANCELLED | OUTPATIENT
Start: 2020-05-13

## 2020-05-13 RX ORDER — PALONOSETRON 0.05 MG/ML
0.25 INJECTION, SOLUTION INTRAVENOUS ONCE
Status: CANCELLED | OUTPATIENT
Start: 2020-05-13

## 2020-05-13 RX ORDER — METHYLPREDNISOLONE 4 MG/1
TABLET ORAL
Qty: 21 EACH | Refills: 0 | Status: SHIPPED | OUTPATIENT
Start: 2020-05-13 | End: 2020-06-24

## 2020-05-13 RX ORDER — PALONOSETRON 0.05 MG/ML
0.25 INJECTION, SOLUTION INTRAVENOUS ONCE
Status: COMPLETED | OUTPATIENT
Start: 2020-05-13 | End: 2020-05-13

## 2020-05-13 RX ADMIN — DEXTROSE MONOHYDRATE 250 ML: 50 INJECTION, SOLUTION INTRAVENOUS at 09:16

## 2020-05-13 RX ADMIN — LEUCOVORIN CALCIUM 830 MG: 350 INJECTION, POWDER, LYOPHILIZED, FOR SOLUTION INTRAMUSCULAR; INTRAVENOUS at 10:08

## 2020-05-13 RX ADMIN — DEXAMETHASONE SODIUM PHOSPHATE 12 MG: 10 INJECTION, SOLUTION INTRAMUSCULAR; INTRAVENOUS at 09:50

## 2020-05-13 RX ADMIN — FLUOROURACIL 2480 MG: 50 INJECTION, SOLUTION INTRAVENOUS at 12:16

## 2020-05-13 RX ADMIN — OXALIPLATIN 90 MG: 5 INJECTION, SOLUTION, CONCENTRATE INTRAVENOUS at 10:08

## 2020-05-13 RX ADMIN — PALONOSETRON HYDROCHLORIDE 0.25 MG: 0.25 INJECTION, SOLUTION INTRAVENOUS at 09:16

## 2020-05-13 RX ADMIN — SODIUM CHLORIDE 100 ML: 9 INJECTION, SOLUTION INTRAVENOUS at 09:16

## 2020-05-13 NOTE — PROGRESS NOTES
REASON FOR FOLLOW UP:     1. Newly diagnosed rectal cancer, rectal ultrasound examination reported T3N0 disease without lymphadenopathy. She does have small pulmonary nodule 6-7 mm and 2 mm with indeterminate feature. There is no solid evidence of metastatic disease otherwise. Patient has stage IIA (T3N0M0) disease.  2. The patient is heterozygous factor V Leiden, currently on prophylactic anticoagulation with Lovenox 40 mg daily given her increased risk of thrombosis with MediPort and GI malignancy.   3.  The 8 mm hypermetabolic right upper lobe pulmonary nodule is suspicious for metastatic as well.    4.  Patient had a PowerPort placement on the left upper chest by Dr. Joseph on 8/9/2019.  5.  Patient was started on concurrent infusional 5-FU chemoradiation therapy on 8/12/2019, with planned complete surgical resection and further adjuvant chemotherapy with intention to cure the disease.   6. Patient underwent surgical resection of the primary rectal cancer by Dr. Ye on 12/2/2019.  Pathology evaluation reported residual T3N1 disease stage IIIb.  7. Diarrhea related to therapy and radiation.   8. Patient was started cycle 1 day 1 of adjuvant FOLFOX 6 on 1/23/2020.  9.  On 2/5/2020 FOLFOX 6 cycle 2 delayed secondary to neutropenia.  Patient was given 3 days of Granix injection.  After cycle #2 we planned 3 days of Granix with each cycle.    10.  Patient also intolerant of oral iron.  Patient received 2 doses of IV injectafer on 02/05/2020 and 02/12/2020.   10. 02/12/2020 Proceed with cycle #2 of FOLFOX 6 with 3 days of Granix.  11.  3/11/2020 cycle 4 postponed secondary to abdominal pain and occasional low-grade fevers.  CT scans ordered.  12.  Cycle #4 resumed after CT scan revealed no evidence of disease.  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 Ye. patient has no fever.  Continue to observe.   13.  Grade 3 hand-foot syndrome secondary  to 5-FU after cycle #7 FOLFOX 6 chemotherapy, prompting ER visit.  Also has worsening peripheral neuropathy.    HISTORY OF PRESENT ILLNESS:  The patient is a 39 y.o. female with the above medical history who presents today for re-evaluation and anticipation of cycle 8 of FOLFOX 6 chemotherapy.      Patient states at this time she is not tolerating her chemotherapy well.     Patient seen in the emergency department on 5/2/2020 for what was suspected to be an allergic reaction.  She called our on-call service on Saturday explaining that she was experiencing hand and face swelling.  She was instructed to proceed to the emergency department at which time she was assessed and prescribed a Medrol Dosepak.  She had just completed her 5-FU and was unhooked on Friday, 5/1/2020.      She reports since her ED visit on 5/2/2020 her symptoms have not improved. Her hands and feet were swollen upon presenting to the ED and she could barely make a fist. She states that she feels so swollen she is not able to stand it. She states that her skin on the hands and feet are peeling extensively as well, besides changing color to more dark.     She also reports significant fatigue after her first week of the 5-FU treatment but she expected this side effect. She also notices significant increase in her neuropathy to the point that she is not able to even walk around in her home without her house slippers due to irritation from her rugs. She denies and associated nausea or vomiting at this time. She also has episodes of epistaxis every day after the chemotherapy cycle #7.  She does report working full-time during the week of chemotherapy.      Laboratory studies today, 5/13/2020, show moderate thrombocytopenia platelets 123,000, low normal WBC 4140 including ANC 2720 and normal hemoglobin 13.4.         Past Medical History:   Diagnosis Date   • Abnormal Pap smear of cervix 02/02/1998    JULIUS I   • Anemia in pregnancy    • Anxiety    •  Cervical lymphadenitis 2012    SEEN AT [facility] ER   • Chronic diarrhea    • Colon polyps     FOLLOWED BY DR. GERONIMO YE   • Depression    • Factor V Leiden (CMS/HCC)     DX 2019   • GERD (gastroesophageal reflux disease)    • Heart murmur     ?   • Hematochezia    • Hemorrhoids    • History of chemotherapy     FOLLOWED BY DR. ALEXANDRU HUNT   • History of gestational diabetes    • History of pre-eclampsia    • History of radiation therapy     FOLLOWED BY DR. JAVON LEWIS   • History of TB skin testing 2009    TB Skin Test   • HPV in female    • IBS (irritable bowel syndrome)    • Infected insect bite of neck 2016    SEEN AT [facility]   • Irritable bowel syndrome    • Kidney stones 2007    SEEN AT [facility] ER   • Lumbar disc disease    • On anticoagulant therapy    • PIH (pregnancy induced hypertension)    • Pulmonary embolism (CMS/HCC) 2019    ADMITTED TO [facility]   • Rectal cancer (CMS/HCC) 2019    INVASIVE MODERATELY DIFFERENTIATED ADENOCARCINOMA, CHEMO AND XRT FINISHED 2019   • Right leg pain    • Right ureteral stone 10/01/2019    SEEN AT [facility] ER   • SAB (spontaneous ) 1996     A2-1 INDUCED   • Sepsis due to cellulitis (CMS/HCC) 2002    LEFT GREAT TOE, ADMITTED TO [facility]     Past Surgical History:   Procedure Laterality Date   • CHOLECYSTECTOMY N/A 10/10/2003    LAPAROSCOPIC WITH CHOLANGIOGRAM, DR. JAMEY TALAVERA AT [facility]   • COLON RESECTION N/A 2019    Procedure: laparoscopic low anterior colon resection with mobilization of splenic flexure and diverting loop ileostomy: ERAS;  Surgeon: Geronimo Ye MD;  Location: [facility];  Service: General   • COLONOSCOPY W/ POLYPECTOMY N/A 2019    15 MM TUBULOVILLOUS ADENOMA POLYP IN HEPATIC FLEXURE, 20 MMTUBULOVILLOUS ADENOMA WITH HIGH GRADE DYSPLASIA IN RECTOSIGMOID, 4 CM MASS IN MID RECTUM, PATH: INVASIVE MODERATELY DIFFERENTIATED ADENOCARCINOMA, DR. JENNIFER LI AT [facility]   • DILATATION AND EVACUATION N/A 2009   • ENDOSCOPY N/A 07/08/2019    LA GRADE A ESOPHAGITIS, GASTRITIS, ALL BIOPSIES BENIGN, DR. ROLAND LI AT [facility]   • PAP SMEAR N/A 01/24/2014   • SIGMOIDOSCOPY N/A 7/24/2019    INFILTRATIVE PARTIALLY OBSTRUCTING LARGE RECTAL CANCER, AREA TATOOED, DR. GERONIMO YE AT [facility]   • SIGMOIDOSCOPY N/A 11/23/2019    AN ULCERATED PARTIALLY OBSTRUCTING MASS IN MID RECTUM, AREA TATTOOED, DR. GERONIMO YE AT [facility]   • TRANSRECTAL ULTRASOUND N/A 7/24/2019    Procedure: ULTRASOUND TRANSRECTAL;  Surgeon: Geronimo Ye MD;  Location: [facility] ENDOSCOPY;  Service: General   • URETEROSCOPY LASER LITHOTRIPSY WITH STENT INSERTION Right 10/2019   • VAGINAL DELIVERY N/A 09/18/1998   • VENOUS ACCESS DEVICE (PORT) INSERTION Left 8/9/2019    Procedure: INSERTION VENOUS ACCESS DEVICE;  Surgeon: Sj Joseph MD;  Location: [facility] OR [facility];  Service: General   • WISDOM TOOTH EXTRACTION  1993       HEMATOLOGIC/ONCOLOGIC HISTORY:      The patient reports she has intermittent rectal bleeding and urgency, mucous with her stool, starting sometime in 2016. At that time she was referred to GI service, and was diagnosed of irritable bowel syndrome. Nevertheless she had worsening urgency for bowel movement, and had a couple of incidents losing control of stool. She was recently seen by Roland Li MD again and had colonoscopy and EGD exam on 07/08/2019. She was found having a circumferential rectal mass. According to the procedure note, the patient had a fungating circumferential bleeding 4 cm mass in the middle rectum, at distance between 13 cm and 17 cm from the anus. Mass was causing partial obstruction. There were also colon polyps found at the hepatic flexure and also at the rectosigmoid junction 23 cm from the anus. Both were resected and retrieved. EGD examination reported grade A esophagitis at the GE junction and patchy discontinuous erythema and aggravation of the mucosa without  bleeding in the stomach body. There is normal mucosa of the duodenum. Pathology evaluation from this procedure reported moderately differentiated adenocarcinoma involving the rectal mass. The rectal sigmoid junction polyp was tubular/tubulovillous adenoma with high grade dysplasia negative for invasive malignancy. The hepatic flexure polyp was also tubular/tubulovillous adenoma negative for high grade dysplasia or malignancy. The biopsy from the esophagus reports squamocolumnar mucosa with inflammatory changes suggestive of mild reflux esophagitis, negative for interstitial metaplasia. Gastric biopsy was benign and duodenal biopsy was also benign. There is no mention of H-pylori.     Patient was subsequently referred to colorectal surgeon Padmaja Ye MD for further evaluation. The patient had CT scan examination for chest, abdomen and pelvis requested by Dr. Ye and were done on 07/13/2019. The study reported no evidence of lymphadenopathy in the abdomen and pelvis. There was wall thickening of the rectosigmoid junction. Normal spleen, pancreas, adrenal glands and kidneys. There was fatty liver infiltration but no focal lymphatic lesions. There was a small 6-7 mm noncalcified nodule in the right upper lobe and a tiny 3 mm subpleural nodule in the right middle lobe. No mediastinal or hilar lymphadenopathy.     Dr. Ye performed staging rectal ultrasound examination. This study reported tumor penetrating through the muscularis propria as T3 disease; there were no lymph nodes identified.    She had a hospitalization in late September 2019 because of newly discovered pulmonary emboli.  She was on prophylactic Lovenox prior to the incident of PE.  Now she is on full dose Xarelto anticoagulation.  Patient reports no further chest pain dyspnea.  She denies bleeding or bruising.  During her hospitalization, she was seen by Dr. Ye, who plans to have surgery 8 to 12 weeks after finishing radiation therapy.  She  finished her radiation on 9/19/2019.     I noticed patient also presented to the emergency room on 10/1/2019 complaining of right flank area pain.  I reviewed the images studies and indeed she has a very small 1 to 2 mm obstructing kidney stone in the UV junction.  Patient is still symptomatic with some pains and dysuria.  She denies fever sweating or chills.    Laboratory study on 10/7/2019 reported normal CBC including hemoglobin 13.1, platelets 301,000, WBC 6170 and ANC 4900 lymphocytes 590 monocytes 480.      The nurse reported malfunction of port-a-catheter on 10/7/2019.  Port study on 10/8/2019 showed fibrin sheath around the distal aspect of the Mediport catheter in the SVC. This does not appear to hinder injection, but does prevent aspiration at this time.    Patient underwent surgical resection of the colon on 12/2/2019 with Dr. Ye.  Pathology evaluation reported residual T3 disease, also 1 out of 14 lymph nodes positive for malignancy.  So this patient in either had at least stage IIIb disease (T3 N1 M0?).      Adjuvant chemotherapy FOLFOX 6 will be started on 1/22/2020.  Laboratory study reported iron saturation 10%, free iron 31 TIBC 319 and ferritin 168.  Her hemoglobin was 11.8, WBC 5600, and platelets 347,000.    Patient was here on 02/12/2020 for cycle 2 of her FOLFOX.  This is delayed x1 week secondary to neutropenia.  The patient ultimately received 3 days worth of Neupogen with recovery of her blood counts.  Of note, the patient struggled with significant nausea without any episodes of vomiting following cycle #1 of chemotherapy resulting in significant weight loss.  She is up 12 pounds over the last week as her appetite has normalized.  We will add Emend to her care plan.    She is having several loose stools per her colostomy and has been hesitant to take Imodium due to prior history of constipation.  I encouraged her to try this with a maximum of 8 tablets/day.  She denies any infectious  symptoms including fevers or chills.  No excess bleeding or bruising noted.  She had the expected cold sensitivity related to the Oxaliplatin for about 3 days following treatment.    Labs from 02/12/2020 demonstrated total white blood cell count of 5.14, ANC of 3.06, hemoglobin of 11.2, platelets of 211,000.  She was found to be iron deficient last week and is intolerant to oral iron secondary to GI distress.  For this reason, she initiated IV iron therapy with Injectafer last week.  She had received her second dose 02/12/2020    Patient has normalized hemoglobin 12.2 on 2/26/2020.    She reported on 5/5/2020 she had a recent visit to the emergency department for what was suspected to be an allergic reaction.  She called our on-call service on Saturday with reports of hand and face swelling.  She was instructed to proceed to the emergency department at which time she was assessed and prescribed a Medrol Dosepak.  She had just completed her 5-FU and was unhooked on Friday, 5/3/2020.  Her symptoms have improved.  She does report persistent hyperpigmentation and mild swelling of the palms of the hands but this is much improved.  It was her right hand specifically that was swollen.  Her facial swelling has resolved.  She continues on Cefdinir nd since has 1 day remaining, she was prescribed cefdinir for a UTI requiring hospitalization at the end of April.  Reports no new symptoms.  Her labs are stable.  She is scheduled for treatment again.    MEDICATIONS    Current Outpatient Medications:   •  clonazePAM (KlonoPIN) 1 MG tablet, Take 1 tablet by mouth 3 (Three) Times a Day As Needed for Anxiety or Seizures., Disp: 90 tablet, Rfl: 0  •  dexamethasone (DECADRON) 0.1 % ophthalmic solution, ADMINISTER 1 DROP TO BOTH EYES EVERY 6 (SIX) HOURS AS NEEDED (EYE IRRITATION/TEARING)., Disp: , Rfl:   •  escitalopram (LEXAPRO) 20 MG tablet, Take 1 tablet by mouth Daily., Disp: 90 tablet, Rfl: 3  •  famotidine (PEPCID) 20 MG tablet,  TAKE 1 TABLET BY MOUTH TWICE A DAY, Disp: 60 tablet, Rfl: 1  •  Filgrastim-aafi (NIVESTYM) 480 MCG/0.8ML solution prefilled syringe, Inject 480 mcg as directed Daily. For 3 days after each chemotherapy cycle. Cycles are every 2 weeks., Disp: 4.8 mL, Rfl: 6  •  HYDROcodone-acetaminophen (NORCO) 7.5-325 MG per tablet, Take 1 tablet by mouth Every 4 (Four) Hours As Needed for Moderate Pain ., Disp: 180 tablet, Rfl: 0  •  Loratadine (CLARITIN) 10 MG capsule, Take  by mouth., Disp: , Rfl:   •  ondansetron (ZOFRAN) 8 MG tablet, Take 1 tablet by mouth 3 (Three) Times a Day As Needed for Nausea or Vomiting., Disp: 60 tablet, Rfl: 5  •  potassium chloride (K-DUR,KLOR-CON) 20 MEQ CR tablet, Take 1 tablet by mouth Daily., Disp: 30 tablet, Rfl: 2  •  prochlorperazine (COMPAZINE) 10 MG tablet, Take 1 tablet by mouth Every 6 (Six) Hours As Needed for Nausea or Vomiting., Disp: 30 tablet, Rfl: 2  •  rivaroxaban (XARELTO) 20 MG tablet, Take 1 tablet by mouth Daily. Start after finishing starter pack. (Patient taking differently: Take 20 mg by mouth Daily. PT HOLDING 48 HOURS PRIOR TO SURGERY), Disp: 30 tablet, Rfl: 11  •  dexamethasone (DECADRON) 0.1 % ophthalmic suspension, Administer 1 drop to both eyes Every 6 (Six) Hours As Needed (eye irritation/tearing)., Disp: 5 mL, Rfl: 3    ALLERGIES:   No Known Allergies    SOCIAL HISTORY:       Social History     Tobacco Use   • Smoking status: Heavy Tobacco Smoker     Packs/day: 0.50     Years: 24.00     Pack years: 12.00     Types: Electronic Cigarette, Cigarettes   • Smokeless tobacco: Never Used   Substance Use Topics   • Alcohol use: Not Currently     Comment: Rarely   • Drug use: No         FAMILY HISTORY:   Mother has positive factor V Leiden mutation, although she did not have thrombosis, mother also is coronary disease with stenting, she also is occasional bruising.  Maternal grandmother had DVT, she had multiple surgical procedures.  Patient mother's half-brother had metastatic  "colon cancer at diagnosis in his 50s.  Maternal great aunt has breast cancer.  Patient will follow his skin cancer in his 60s, exclusive.    REVIEW OF SYSTEMS:  Review of Systems   Constitutional: Positive for fatigue. Negative for appetite change, chills, diaphoresis and fever.   HENT: Positive for nosebleeds. Negative for congestion, hearing loss, mouth sores and trouble swallowing.    Eyes: Negative for photophobia and visual disturbance.   Respiratory: Negative for cough, chest tightness and shortness of breath.    Cardiovascular: Negative for chest pain, palpitations and leg swelling.   Gastrointestinal: Negative for abdominal distention, abdominal pain, anal bleeding, constipation, diarrhea and nausea.   Endocrine: Negative for cold intolerance and heat intolerance.   Genitourinary: Negative for frequency and hematuria.        See HPI    Musculoskeletal: Negative for arthralgias, back pain and joint swelling.   Skin: Positive for color change. Negative for rash.        See hpi   Allergic/Immunologic: Positive for immunocompromised state (Secondary to adjuvant chemotherapy). Negative for environmental allergies and food allergies.   Neurological: Positive for numbness (cold exposure related to treatment). Negative for dizziness, speech difficulty and headaches.   Hematological: Negative for adenopathy. Does not bruise/bleed easily.   Psychiatric/Behavioral: Negative for agitation, behavioral problems, confusion and suicidal ideas.          Vitals:    05/13/20 0805   BP: 122/89   Pulse: 114   Resp: 16   Temp: 98 °F (36.7 °C)   SpO2: 95%   Weight: 97.2 kg (214 lb 3.2 oz)   Height: 166 cm (65.35\")   PainSc: 0-No pain     Current Status 5/13/2020   ECOG score 0     PHYSICAL EXAM:    GENERAL:  Well-developed, well-nourished  female in no acute distress.  SKIN: Hyperpigmentation noted of her palms bilaterally.  Mild symmetrical swelling.  No heat or tenderness. Peeling of skin on fingers, minimal darkening " of skin on hands  EYES:  Pupils equal, round and reactive to light.  EOMs intact.   Conjunctive are normal.  EARS:  Hearing intact.  NOSE: Patient with scant nosebleeds noted.  CHEST: Regular respiratory effort, bilateral clear breathing sounds.  CARDIAC:  Regular rhythm and rate.  No murmurs.   ABDOMEN: No tenderness.  No mass palpable.  Bowel sounds present.  Ostomy in place with small amount of liquid output.      EXTREMITIES:  No clubbing, cyanosis or edema.  NEUROLOGICAL:  Cranial Nerves II-XII grossly intact.  No focal neurological deficits.  PSYCHIATRIC:  Normal affect and mood.        RECENT LABS:  Results from last 7 days   Lab Units 05/13/20  0759   WBC 10*3/mm3 4.14   NEUTROS ABS 10*3/mm3 2.72   HEMOGLOBIN g/dL 13.4   HEMATOCRIT % 41.4   PLATELETS 10*3/mm3 123*     Results from last 7 days   Lab Units 05/13/20  0800   SODIUM mmol/L 140   POTASSIUM mmol/L 3.8   CHLORIDE mmol/L 103   CO2 mmol/L 26.0   BUN mg/dL 9   CREATININE mg/dL 0.71   CALCIUM mg/dL 9.3   ALBUMIN g/dL 3.50   BILIRUBIN mg/dL 0.2   ALK PHOS U/L 113   ALT (SGPT) U/L 44*   AST (SGOT) U/L 30   GLUCOSE mg/dL 135*   MAGNESIUM mg/dL 1.7*                Assessment/Plan      ASSESSMENT:     1.  Newly diagnosed rectal cancer, rectal ultrasound examination reported T3N0 disease without lymphadenopathy. CT scan of chest, abdomen and pelvis reported no lymphadenopathy in the abdomen, pelvis or chest. She does have small pulmonary nodule 6-7 mm and 2 mm with indeterminate feature. There is no solid evidence of metastatic disease otherwise.   · Based on the CT scan and rectal ultrasound imaging studies, this patient had stage IIA (T3N0M0) disease.    · She had PET scan examination on 8/8/2019 which reported a hypermetabolic right upper lobe pulmonary nodule 6 mm with SUV 5.6.  This is suspicious for metastatic disease.  This patient may have stage IV disease.   · She initiated concurrent radiation with continuous 5-FU on 8/12/2019.  · Patient finished  concurrent chemoradiation therapy.  · Patient underwent surgical resection of the rectal tumor and diverting loop ileostomy on 12/2/2019 with Dr. Ye.  Pathology evaluation reported residual T3 disease, with 1 out of 14 lymph nodes positive for malignancy.  Certainly this patient has at least stage IIIb rectal cancer (T3 N1 M0?)  · On 1/22/2020 adjuvant chemotherapy FOLFOX 6 regimen initiated.    · On 2/5/2022 cycle #2 was delayed secondary to neutropenia.  She was given 3 days of Granix.   · Emend added with cycle 3.  With improved nausea control  · Continuing home Granix x3 days following 5-FU disconnect  · 3/11/2020 due for cycle 4, however, she is experiencing progressive abdominal pain and occasional fevers.   · CT scan performed on 3/13/2020 reveals no evidence of progressive or recurrent disease.  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  · Patient hospitalized 4/24-4/26/20 after cycle 5 of chemotherapy with acute UTI.  CT abdomen/pelvis noting fluid collection in the presacral region having diminished in size compared to CT dated 3/13/2020.  Patient was evaluated by Dr. Ye while in the hospital with further plans to evaluate possible colovaginal fistula following completion of chemotherapy.  Patient did respond to IV antibiotics and discharged home on oral cefdinir.  · 4/29/2020 cycle 6 of FOLFOX.  Urinary symptoms have resolved   · Patient seen in the [facility] ED on 5/2/2020 for what was suspected to be an allergic reaction.  She called our service on Saturday explaining that she was experiencing hand and face swelling.  She was instructed to proceed to the emergency department at which time she was assessed and prescribed a Medrol Dosepak.  She had just completed her 5-FU and was unhooked on Friday, 5/1/2020.  Her symptoms have resolved in the office on assessment, 5/5/2020.  · The patient had grade 3 hand-foot syndrome based on symptomology.  Patient is to have cycle #8 of 5-FU today. Due to her  symptoms and poor tolerance to the 5-FU, her treatment dose will be reduced 50% for oxaliplatin and infusional 5-FU.  Bolus 5-FU will be discontinued..  · I will order a PET scan to be performed in 1 week.    · Follow-up with me in 2 weeks with laboratory studies per protocol and imaging review.  We will discuss further treatment options pending the scan results.  If she has indeed residual disease or metastatic disease, we will switch her to irinotecan plus Avastin or anti-EGFR monoclonal antibody.  She will need a further molecular testing of her tumor samples in that case.    2 .  Pulmonary nodule, hypermetabolic on previous PET scan.   · Her PET scan examination from 8/8/2019 reported a small 8 mm right upper apex pulmonary nodule but hypermetabolic SUV 5.1.  That was too small to be biopsied, however there was always a possibility of metastatic disease considering that high activity despite a such a small nodule.  · Her chest CT scan examination from 3/13/2020 reported this shrank to 6 mm.  1. I advised patient today on 5/13/2020 to have a PET scan examination after cycle #8 FOLFOX chemotherapy for reevaluation of this lesion to see whether that responded to the treatment.  If she continues to have persistent disease, will likely have to change her chemotherapy regimen.  If no evidence of residual disease/metastatic disease, will discontinue chemotherapy.    3.   Pulmonary emboli with background of positive factor V Leiden mutation   · The patient has heterozygous factor V Leiden mutation and therefore was on low-dose anticoagulation with Lovenox, 40 mg daily given her increased risk of thrombosis with MediPort and GI malignancy.    · Nevertheless this patient was found to having pulmonary emboli on 9/20/2019 despite low-dose Lovenox.  She had a brief hospitalization in late September 2019, she was started on full dose Xarelto anticoagulation per protocol.    · The patient continues on Xarelto and is tolerating  "this well.  Is having some issues with slight nosebleeds as outlined above.  Patient to call with worsening.     4.  Loose, frequent stools:   · Patient is s/p surgical resection of the rectum tumor and diverting loop ileostomy on 12/2/2019 with Dr. Ye.  · She reports having liquid stools in her colostomy bag.  She reports her liquid stools typically appear \"clear mustard yellow\" or dark.  · On 4/1/2020 patient was recommended to start on Bentyl    · Patient notes output has stabilized and is more pasty which is baseline for her.    5. Pelvic pain: Related to previous radiation therapy and the surgery for resection of the primary rectal cancer.  Stable see above.    6. Hypokalemia: .  · Potassium was 4.7 on 5/5/2020  · Potassium today is 3.8 on 5/13/2020.    7.  This patient also has strong family history for malignancy the patient had appointment with genetic counseling on September 3, 2019. NF1 c587 3C >T.    7.  Nausea: This does seem to be multifactorial with a large part due to anxiety.  The patient does continue on Lexapro at 20 mg daily as well as Klonopin 0.5 mg once daily as needed.      · Emend was added with cycle 3 with much better management of nausea  · She can continue to utilize Zofran and Compazine as needed  · Is not experiencing nausea today.     8.  Mild anemia, improved since her surgery.    · She also has a history of iron deficiency.  Iron studies reveal a saturation of just 10%.  She was started on oral iron but was unable to tolerate due to GI side effects.   · Status post Injectafer 2/5/2020 and 2/12/2020   · Hemoglobin was within normal limits on 4/1/2020 at 13.1  · Hemoglobin 13.4 on 5/2/2020  · Hemoglobin stable at 13.4 on 5/13/2020    9.  Neutropenia secondary to chemotherapy.  · As noted above, the patient will receive 3 days of Granix begin on day 4 of each cycle of chemotherapy.  We obtained approval for self administration at home.  This started following cycle 3 " chemotherapy  · Total white count on 4/1/2020 was 3930, ANC 2540  · Total white count on 5/2/2020 was 3780 with an ANC of 2700.  · Total white count today on 5/13/2020 was 4140 with an ANC of 2720.    10.  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    · Jaison Ye and Patrick spoke regarding CT scan and felt OK to proceed with treatment.    · Patient saw Dr. Ye during brief hospitalization 4/24-4/26 with repeat CT scan showing improvement of fluid collection in the presacral region.  Plan to follow-up on possible fistula following completion of chemotherapy.    11.  Cold sensitivity secondary to oxaliplatin  · Lasting 10 days to 2 weeks with numbness almost constantly.  · Patient began B complex on 4/1/2020  · Continue B complex      12.  Hypomagnesemia.  · Magnesium 2.0 on 5/5/2020.  · Magnesium 1.7 today.  Continue to monitor.  She already has loose bowel movement, we will not give oral magnesium oxide for now.     13.  Peripheral neuropathy secondary to chemotherapy.  This is mild involving bilateral hands.  However her cholecystectomy actually last longer.  She is aware not to drink or eat cold food/beverage nor touch cold objects.    14.  Hand-foot syndrome grade 3.    · It become so significant after cycle #7 FOLFOX treatment.  Discussed with patient on 5/13/2020, will discontinue the bolus 5-FU dose, and also decrease 50% of the infusion dose through the pump.   · We also use Medrol Dosepak for possible recurrent symptomology.  She responded this last week.    15.  Hyperlacrimation and mild scleral irritation related to 5-FU.  She has been taking steroid eyedrops prescribed by Daksha Cruz last visit.  This has improved her hyperlacrimation.     16.  Mild epistaxis.  Patient's platelet count did drop to 73,000 during hospitalization and has improved to 117,000 5/5/2020.  Patient advised to monitor nosebleeds and call with worsening symptoms.  She will continue her Xarelto for now.  She has not experienced any  nosebleeds in the past several days.   · Today on 5/13/2020 patient reports she is having in increase in her episodes of epistaxis from the chemotherapy.  There is no visible epistaxis in the clinic.  Continue to monitor for now.     17.  Thrombocytopenia secondary to chemotherapy.  · Patient has mildly low platelets 123,000 5/13/2020.  Continue to monitor.    PLAN:  1. Patient is to have cycle #8 of oxaliplatin and 5-FU today. Due to her symptoms and poor tolerance to the oxaliplatin and 5-FU. Her 5-FU treatment bolus dose will be discontinued, and the infusion dose will be reduced 50%.  Oxaliplatin will also be dose reduced 50%.   2. Patient will be given G-CSF for 3 days for marrow support.  3. I will order a PET scan to be performed in 1 week for reassessment.    4. We will continue her Medrol Dosepak as prescribed. I E-scribed the prescription to the patient's pharmacy today.   5. She will continue Xarelto as prescribed.  6. Continue oral potassium chloride.  7. Follow-up with me in 2 weeks with laboratory studies per protocol and imaging review.  If patient has residual disease or metastatic by PET scan, we could consider Irinotecan plus Avastin or anti-EGFR antibody.   8. I have asked the patient to call the office should she experience new or worsening symptoms.     By signing my name here, I Aric Sarah, attest that all documentation on 05/13/20 at 08:48 has been prepared under the direction and in the presence of Dr. Alexandru Hunt MD.    I reviewed the note scribed by Aric Smith and made appropriate corrections.      ALEXANDRU HUNT M.D., Ph.D.        CC: Deepika Vela III NP-C

## 2020-05-13 NOTE — TELEPHONE ENCOUNTER
----- Message from Yanelis Buchanan RN sent at 5/13/2020 11:32 AM EDT -----  Please schedule chemo unhook Friday at 12:15. Thanks.

## 2020-05-15 ENCOUNTER — INFUSION (OUTPATIENT)
Dept: ONCOLOGY | Facility: HOSPITAL | Age: 40
End: 2020-05-15

## 2020-05-15 DIAGNOSIS — C20 ADENOCARCINOMA OF RECTUM (HCC): Primary | ICD-10-CM

## 2020-05-15 DIAGNOSIS — Z45.2 ENCOUNTER FOR FITTING AND ADJUSTMENT OF VASCULAR CATHETER: ICD-10-CM

## 2020-05-15 PROCEDURE — 25010000003 HEPARIN LOCK FLUSH PER 10 UNITS: Performed by: INTERNAL MEDICINE

## 2020-05-15 PROCEDURE — 96523 IRRIG DRUG DELIVERY DEVICE: CPT

## 2020-05-15 RX ORDER — SODIUM CHLORIDE 0.9 % (FLUSH) 0.9 %
10 SYRINGE (ML) INJECTION AS NEEDED
Status: DISCONTINUED | OUTPATIENT
Start: 2020-05-15 | End: 2020-05-15 | Stop reason: HOSPADM

## 2020-05-15 RX ORDER — HEPARIN SODIUM (PORCINE) LOCK FLUSH IV SOLN 100 UNIT/ML 100 UNIT/ML
500 SOLUTION INTRAVENOUS AS NEEDED
Status: CANCELLED | OUTPATIENT
Start: 2020-05-15

## 2020-05-15 RX ORDER — HEPARIN SODIUM (PORCINE) LOCK FLUSH IV SOLN 100 UNIT/ML 100 UNIT/ML
500 SOLUTION INTRAVENOUS AS NEEDED
Status: DISCONTINUED | OUTPATIENT
Start: 2020-05-15 | End: 2020-05-15 | Stop reason: HOSPADM

## 2020-05-15 RX ORDER — SODIUM CHLORIDE 0.9 % (FLUSH) 0.9 %
10 SYRINGE (ML) INJECTION AS NEEDED
Status: CANCELLED | OUTPATIENT
Start: 2020-05-15

## 2020-05-15 RX ADMIN — Medication 500 UNITS: at 12:40

## 2020-05-15 RX ADMIN — SODIUM CHLORIDE, PRESERVATIVE FREE 10 ML: 5 INJECTION INTRAVENOUS at 12:37

## 2020-05-21 ENCOUNTER — HOSPITAL ENCOUNTER (OUTPATIENT)
Dept: PET IMAGING | Facility: HOSPITAL | Age: 40
Discharge: HOME | End: 2020-05-21

## 2020-05-21 ENCOUNTER — HOSPITAL ENCOUNTER (OUTPATIENT)
Dept: PET IMAGING | Facility: HOSPITAL | Age: 40
Discharge: HOME | End: 2020-05-21
Admitting: INTERNAL MEDICINE

## 2020-05-21 DIAGNOSIS — C20 ADENOCARCINOMA OF RECTUM (HCC): ICD-10-CM

## 2020-05-21 LAB — GLUCOSE BLDC GLUCOMTR-MCNC: 84 MG/DL (ref 70–130)

## 2020-05-21 PROCEDURE — A9552 F18 FDG: HCPCS | Performed by: INTERNAL MEDICINE

## 2020-05-21 PROCEDURE — 78815 PET IMAGE W/CT SKULL-THIGH: CPT

## 2020-05-21 PROCEDURE — 82962 GLUCOSE BLOOD TEST: CPT

## 2020-05-21 PROCEDURE — 0 FLUDEOXYGLUCOSE F18 SOLUTION: Performed by: INTERNAL MEDICINE

## 2020-05-21 RX ADMIN — FLUDEOXYGLUCOSE F18 1 DOSE: 300 INJECTION INTRAVENOUS at 12:28

## 2020-05-27 ENCOUNTER — OFFICE VISIT (OUTPATIENT)
Dept: ONCOLOGY | Facility: CLINIC | Age: 40
End: 2020-05-27

## 2020-05-27 ENCOUNTER — INFUSION (OUTPATIENT)
Dept: ONCOLOGY | Facility: HOSPITAL | Age: 40
End: 2020-05-27

## 2020-05-27 VITALS
HEART RATE: 94 BPM | WEIGHT: 219.9 LBS | SYSTOLIC BLOOD PRESSURE: 114 MMHG | BODY MASS INDEX: 36.64 KG/M2 | OXYGEN SATURATION: 96 % | TEMPERATURE: 97.8 F | RESPIRATION RATE: 16 BRPM | HEIGHT: 65 IN | DIASTOLIC BLOOD PRESSURE: 80 MMHG

## 2020-05-27 DIAGNOSIS — D70.1 CHEMOTHERAPY-INDUCED NEUTROPENIA (HCC): ICD-10-CM

## 2020-05-27 DIAGNOSIS — T45.1X5A CHEMOTHERAPY-INDUCED NEUTROPENIA (HCC): ICD-10-CM

## 2020-05-27 DIAGNOSIS — C20 ADENOCARCINOMA OF RECTUM (HCC): ICD-10-CM

## 2020-05-27 DIAGNOSIS — L27.1 HAND FOOT SYNDROME: ICD-10-CM

## 2020-05-27 DIAGNOSIS — Z79.01 CHRONIC ANTICOAGULATION: Chronic | ICD-10-CM

## 2020-05-27 DIAGNOSIS — R91.1 PULMONARY NODULE, RIGHT: ICD-10-CM

## 2020-05-27 DIAGNOSIS — D68.51 HETEROZYGOUS FACTOR V LEIDEN MUTATION (HCC): ICD-10-CM

## 2020-05-27 DIAGNOSIS — C20 ADENOCARCINOMA OF RECTUM (HCC): Primary | ICD-10-CM

## 2020-05-27 DIAGNOSIS — Z79.01 ANTICOAGULATION ADEQUATE: ICD-10-CM

## 2020-05-27 LAB
ALBUMIN SERPL-MCNC: 3.3 G/DL (ref 3.5–5.2)
ALBUMIN/GLOB SERPL: 1 G/DL (ref 1.1–2.4)
ALP SERPL-CCNC: 113 U/L (ref 38–116)
ALT SERPL W P-5'-P-CCNC: 36 U/L (ref 0–33)
ANION GAP SERPL CALCULATED.3IONS-SCNC: 11.3 MMOL/L (ref 5–15)
AST SERPL-CCNC: 27 U/L (ref 0–32)
BASOPHILS # BLD AUTO: 0.03 10*3/MM3 (ref 0–0.2)
BASOPHILS NFR BLD AUTO: 0.8 % (ref 0–1.5)
BILIRUB SERPL-MCNC: <0.2 MG/DL (ref 0.2–1.2)
BUN BLD-MCNC: 8 MG/DL (ref 6–20)
BUN/CREAT SERPL: 12.7 (ref 7.3–30)
CALCIUM SPEC-SCNC: 8.8 MG/DL (ref 8.5–10.2)
CHLORIDE SERPL-SCNC: 106 MMOL/L (ref 98–107)
CO2 SERPL-SCNC: 20.7 MMOL/L (ref 22–29)
CREAT BLD-MCNC: 0.63 MG/DL (ref 0.6–1.1)
DEPRECATED RDW RBC AUTO: 76.7 FL (ref 37–54)
EOSINOPHIL # BLD AUTO: 0.49 10*3/MM3 (ref 0–0.4)
EOSINOPHIL NFR BLD AUTO: 13.2 % (ref 0.3–6.2)
ERYTHROCYTE [DISTWIDTH] IN BLOOD BY AUTOMATED COUNT: 19.3 % (ref 12.3–15.4)
GFR SERPL CREATININE-BSD FRML MDRD: 105 ML/MIN/1.73
GLOBULIN UR ELPH-MCNC: 3.3 GM/DL (ref 1.8–3.5)
GLUCOSE BLD-MCNC: 164 MG/DL (ref 74–124)
HCT VFR BLD AUTO: 39.5 % (ref 34–46.6)
HGB BLD-MCNC: 12.6 G/DL (ref 12–15.9)
IMM GRANULOCYTES # BLD AUTO: 0.01 10*3/MM3 (ref 0–0.05)
IMM GRANULOCYTES NFR BLD AUTO: 0.3 % (ref 0–0.5)
LYMPHOCYTES # BLD AUTO: 0.63 10*3/MM3 (ref 0.7–3.1)
LYMPHOCYTES NFR BLD AUTO: 16.9 % (ref 19.6–45.3)
MCH RBC QN AUTO: 33.6 PG (ref 26.6–33)
MCHC RBC AUTO-ENTMCNC: 31.9 G/DL (ref 31.5–35.7)
MCV RBC AUTO: 105.3 FL (ref 79–97)
MONOCYTES # BLD AUTO: 0.31 10*3/MM3 (ref 0.1–0.9)
MONOCYTES NFR BLD AUTO: 8.3 % (ref 5–12)
NEUTROPHILS # BLD AUTO: 2.25 10*3/MM3 (ref 1.7–7)
NEUTROPHILS NFR BLD AUTO: 60.5 % (ref 42.7–76)
NRBC BLD AUTO-RTO: 0 /100 WBC (ref 0–0.2)
PLATELET # BLD AUTO: 163 10*3/MM3 (ref 140–450)
PMV BLD AUTO: 9.1 FL (ref 6–12)
POTASSIUM BLD-SCNC: 3.9 MMOL/L (ref 3.5–4.7)
PROT SERPL-MCNC: 6.6 G/DL (ref 6.3–8)
RBC # BLD AUTO: 3.75 10*6/MM3 (ref 3.77–5.28)
SODIUM BLD-SCNC: 138 MMOL/L (ref 134–145)
WBC NRBC COR # BLD: 3.72 10*3/MM3 (ref 3.4–10.8)

## 2020-05-27 PROCEDURE — 99215 OFFICE O/P EST HI 40 MIN: CPT | Performed by: INTERNAL MEDICINE

## 2020-05-27 PROCEDURE — 36591 DRAW BLOOD OFF VENOUS DEVICE: CPT

## 2020-05-27 PROCEDURE — 85025 COMPLETE CBC W/AUTO DIFF WBC: CPT

## 2020-05-27 PROCEDURE — 80053 COMPREHEN METABOLIC PANEL: CPT

## 2020-05-27 NOTE — PROGRESS NOTES
REASON FOR FOLLOW UP:     1. Newly diagnosed rectal cancer, rectal ultrasound examination reported T3N0 disease without lymphadenopathy. She does have small pulmonary nodule 6-7 mm and 2 mm with indeterminate feature. There is no solid evidence of metastatic disease otherwise. Patient has stage IIA (T3N0M0) disease.  2. The patient is heterozygous factor V Leiden, currently on prophylactic anticoagulation with Lovenox 40 mg daily given her increased risk of thrombosis with MediPort and GI malignancy.   3.  The 8 mm hypermetabolic right upper lobe pulmonary nodule is suspicious for metastatic as well.    4.  Patient had a PowerPort placement on the left upper chest by Dr. Joseph on 8/9/2019.  5.  Patient was started on concurrent infusional 5-FU chemoradiation therapy on 8/12/2019, with planned complete surgical resection and further adjuvant chemotherapy with intention to cure the disease.   6. Patient underwent surgical resection of the primary rectal cancer by Dr. Ye on 12/2/2019.  Pathology evaluation reported residual T3N1 disease stage IIIb.  7. Diarrhea related to therapy and radiation.   8. Patient was started cycle 1 day 1 of adjuvant FOLFOX 6 on 1/23/2020.  9.  On 2/5/2020 FOLFOX 6 cycle 2 delayed secondary to neutropenia.  Patient was given 3 days of Granix injection.  After cycle #2 we planned 3 days of Granix with each cycle.    10.  Patient also intolerant of oral iron.  Patient received 2 doses of IV injectafer on 02/05/2020 and 02/12/2020.   10. 02/12/2020 Proceed with cycle #2 of FOLFOX 6 with 3 days of Granix.  11.  3/11/2020 cycle 4 postponed secondary to abdominal pain and occasional low-grade fevers.  CT scans ordered.  12.  Cycle #4 resumed after CT scan revealed no evidence of disease.  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 Ye. patient has no fever.  Continue to observe.   13.  Grade 3 hand-foot syndrome secondary  to 5-FU after cycle #7 FOLFOX 6 chemotherapy, prompting ER visit.  Also has worsening peripheral neuropathy.   14.  Cycle #8 FOLFOX 6 was given on 5/13/2020, with 50% dose reduction for both 5-FU and oxaliplatin, and also examination of bolus 5-FU.     HISTORY OF PRESENT ILLNESS:  The patient is a 39 y.o. female with the above medical history who presents today for re-evaluation to discuss the PET scan results and further management and plan.      Cycle #8 FOLFOX 6 was given on 5/13/2020, with 50% dose reduction for both 5-FU and oxaliplatin, and also examination of bolus 5-FU.  She states that with the recent reduction of the chemotherapy she feels significantly better. She has more energy and is able to do her daily routine.     On 5/21/2020 patient had a PET scan performed which showed no convincing evidence of residual disease in abdomen or pelvis, no metastatic disease within the chest or neck.     Patient states that she is continuing to follow-up with  but she has not followed-up recently. She states that she still has the ileostomy and she will discuss her options with  and she is hopeful that she will have the ileostomy taken down soon.    Laboratory studies today, 5/27/2020, show mild leukocytopenia WBC 3720 but a normal ANC 2250 and lymphocytes 630.  Normal platelets 163,000 and a hemoglobin 12.6.  Chemistry lab reported normal renal function, liver function panel and a electrolytes, elevated glucose 164.        Past Medical History:   Diagnosis Date   • Abnormal Pap smear of cervix 02/02/1998    JULIUS I   • Anemia in pregnancy    • Anxiety    • Cervical lymphadenitis 06/06/2012    SEEN AT [facility] ER   • Chronic diarrhea    • Colon polyps     FOLLOWED BY DR. GERONIMO ESPARZA   • Depression    • Factor V Leiden (CMS/HCC)     DX 8-2-2019   • GERD (gastroesophageal reflux disease)    • Heart murmur     ?   • Hematochezia    • Hemorrhoids    • History of chemotherapy 2019    FOLLOWED BY DR. HORVATH  MARILEE   • History of gestational diabetes    • History of pre-eclampsia    • History of radiation therapy     FOLLOWED BY DR. JAVON LEWIS   • History of TB skin testing 2009    TB Skin Test   • HPV in female    • IBS (irritable bowel syndrome)    • Infected insect bite of neck 2016    SEEN AT [facility]   • Irritable bowel syndrome    • Kidney stones 2007    SEEN AT [facility] ER   • Lumbar disc disease    • On anticoagulant therapy    • PIH (pregnancy induced hypertension)    • Pulmonary embolism (CMS/HCC) 2019    ADMITTED TO [facility]   • Rectal cancer (CMS/HCC) 2019    INVASIVE MODERATELY DIFFERENTIATED ADENOCARCINOMA, CHEMO AND XRT FINISHED 2019   • Right leg pain    • Right ureteral stone 10/01/2019    SEEN AT [facility] ER   • SAB (spontaneous ) 1996     A2-1 INDUCED   • Sepsis due to cellulitis (CMS/HCC) 2002    LEFT GREAT TOE, ADMITTED TO [facility]     Past Surgical History:   Procedure Laterality Date   • CHOLECYSTECTOMY N/A 10/10/2003    LAPAROSCOPIC WITH CHOLANGIOGRAM, DR. JAMEY TALAVERA AT [facility]   • COLON RESECTION N/A 2019    Procedure: laparoscopic low anterior colon resection with mobilization of splenic flexure and diverting loop ileostomy: ERAS;  Surgeon: Geronimo Ye MD;  Location: [facility];  Service: General   • COLONOSCOPY W/ POLYPECTOMY N/A 2019    15 MM TUBULOVILLOUS ADENOMA POLYP IN HEPATIC FLEXURE, 20 MMTUBULOVILLOUS ADENOMA WITH HIGH GRADE DYSPLASIA IN RECTOSIGMOID, 4 CM MASS IN MID RECTUM, PATH: INVASIVE MODERATELY DIFFERENTIATED ADENOCARCINOMA, DR. JENNIFER LI AT [facility]   • DILATATION AND EVACUATION N/A    • ENDOSCOPY N/A 2019    LA GRADE A ESOPHAGITIS, GASTRITIS, ALL BIOPSIES BENIGN, DR. JENNIFER LI AT [facility]   • PAP SMEAR N/A 2014   • SIGMOIDOSCOPY N/A 2019    INFILTRATIVE PARTIALLY OBSTRUCTING LARGE RECTAL CANCER, AREA TATOOED, DR. GERONIMO YE AT [facility]   • SIGMOIDOSCOPY  N/A 11/23/2019    AN ULCERATED PARTIALLY OBSTRUCTING MASS IN MID RECTUM, AREA TATTOOED, DR. PADMAJA ESPARZA AT [facility]   • TRANSRECTAL ULTRASOUND N/A 7/24/2019    Procedure: ULTRASOUND TRANSRECTAL;  Surgeon: Padmaja Esparza MD;  Location: [facility] ENDOSCOPY;  Service: General   • URETEROSCOPY LASER LITHOTRIPSY WITH STENT INSERTION Right 10/2019   • VAGINAL DELIVERY N/A 09/18/1998   • VENOUS ACCESS DEVICE (PORT) INSERTION Left 8/9/2019    Procedure: INSERTION VENOUS ACCESS DEVICE;  Surgeon: Sj Joseph MD;  Location: [facility] OR [facility];  Service: General   • WISDOM TOOTH EXTRACTION  1993       HEMATOLOGIC/ONCOLOGIC HISTORY:      The patient reports she has intermittent rectal bleeding and urgency, mucous with her stool, starting sometime in 2016. At that time she was referred to GI service, and was diagnosed of irritable bowel syndrome. Nevertheless she had worsening urgency for bowel movement, and had a couple of incidents losing control of stool. She was recently seen by Roland Thorpe MD again and had colonoscopy and EGD exam on 07/08/2019. She was found having a circumferential rectal mass. According to the procedure note, the patient had a fungating circumferential bleeding 4 cm mass in the middle rectum, at distance between 13 cm and 17 cm from the anus. Mass was causing partial obstruction. There were also colon polyps found at the hepatic flexure and also at the rectosigmoid junction 23 cm from the anus. Both were resected and retrieved. EGD examination reported grade A esophagitis at the GE junction and patchy discontinuous erythema and aggravation of the mucosa without bleeding in the stomach body. There is normal mucosa of the duodenum. Pathology evaluation from this procedure reported moderately differentiated adenocarcinoma involving the rectal mass. The rectal sigmoid junction polyp was tubular/tubulovillous adenoma with high grade dysplasia negative for invasive malignancy. The hepatic flexure polyp was also  tubular/tubulovillous adenoma negative for high grade dysplasia or malignancy. The biopsy from the esophagus reports squamocolumnar mucosa with inflammatory changes suggestive of mild reflux esophagitis, negative for interstitial metaplasia. Gastric biopsy was benign and duodenal biopsy was also benign. There is no mention of H-pylori.     Patient was subsequently referred to colorectal surgeon Padmaja Ye MD for further evaluation. The patient had CT scan examination for chest, abdomen and pelvis requested by Dr. Ye and were done on 07/13/2019. The study reported no evidence of lymphadenopathy in the abdomen and pelvis. There was wall thickening of the rectosigmoid junction. Normal spleen, pancreas, adrenal glands and kidneys. There was fatty liver infiltration but no focal lymphatic lesions. There was a small 6-7 mm noncalcified nodule in the right upper lobe and a tiny 3 mm subpleural nodule in the right middle lobe. No mediastinal or hilar lymphadenopathy.     Dr. Ye performed staging rectal ultrasound examination. This study reported tumor penetrating through the muscularis propria as T3 disease; there were no lymph nodes identified.    She had a hospitalization in late September 2019 because of newly discovered pulmonary emboli.  She was on prophylactic Lovenox prior to the incident of PE.  Now she is on full dose Xarelto anticoagulation.  Patient reports no further chest pain dyspnea.  She denies bleeding or bruising.  During her hospitalization, she was seen by Dr. Ye, who plans to have surgery 8 to 12 weeks after finishing radiation therapy.  She finished her radiation on 9/19/2019.     I noticed patient also presented to the emergency room on 10/1/2019 complaining of right flank area pain.  I reviewed the images studies and indeed she has a very small 1 to 2 mm obstructing kidney stone in the UV junction.  Patient is still symptomatic with some pains and dysuria.  She denies fever sweating or  chills.    Laboratory study on 10/7/2019 reported normal CBC including hemoglobin 13.1, platelets 301,000, WBC 6170 and ANC 4900 lymphocytes 590 monocytes 480.      The nurse reported malfunction of port-a-catheter on 10/7/2019.  Port study on 10/8/2019 showed fibrin sheath around the distal aspect of the Mediport catheter in the SVC. This does not appear to hinder injection, but does prevent aspiration at this time.    Patient underwent surgical resection of the colon on 12/2/2019 with Dr. Ye.  Pathology evaluation reported residual T3 disease, also 1 out of 14 lymph nodes positive for malignancy.  So this patient in either had at least stage IIIb disease (T3 N1 M0?).      Adjuvant chemotherapy FOLFOX 6 will be started on 1/22/2020.  Laboratory study reported iron saturation 10%, free iron 31 TIBC 319 and ferritin 168.  Her hemoglobin was 11.8, WBC 5600, and platelets 347,000.    Patient was here on 02/12/2020 for cycle 2 of her FOLFOX.  This is delayed x1 week secondary to neutropenia.  The patient ultimately received 3 days worth of Neupogen with recovery of her blood counts.  Of note, the patient struggled with significant nausea without any episodes of vomiting following cycle #1 of chemotherapy resulting in significant weight loss.  She is up 12 pounds over the last week as her appetite has normalized.  We will add Emend to her care plan.    She is having several loose stools per her colostomy and has been hesitant to take Imodium due to prior history of constipation.  I encouraged her to try this with a maximum of 8 tablets/day.  She denies any infectious symptoms including fevers or chills.  No excess bleeding or bruising noted.  She had the expected cold sensitivity related to the Oxaliplatin for about 3 days following treatment.    Labs from 02/12/2020 demonstrated total white blood cell count of 5.14, ANC of 3.06, hemoglobin of 11.2, platelets of 211,000.  She was found to be iron deficient last week  and is intolerant to oral iron secondary to GI distress.  For this reason, she initiated IV iron therapy with Injectafer last week.  She had received her second dose 02/12/2020    Patient has normalized hemoglobin 12.2 on 2/26/2020.    She reported on 5/5/2020 she had a recent visit to the emergency department for what was suspected to be an allergic reaction.  She called our on-call service on Saturday with reports of hand and face swelling.  She was instructed to proceed to the emergency department at which time she was assessed and prescribed a Medrol Dosepak.  She had just completed her 5-FU and was unhooked on Friday, 5/3/2020.  Her symptoms have improved.  She does report persistent hyperpigmentation and mild swelling of the palms of the hands but this is much improved.  It was her right hand specifically that was swollen.  Her facial swelling has resolved.  She continues on Cefdinir nd since has 1 day remaining, she was prescribed cefdinir for a UTI requiring hospitalization at the end of April.  Reports no new symptoms.  Her labs are stable.  She is scheduled for treatment again.    Patient states at this time she is not tolerating her chemotherapy well.     Patient seen in the emergency department on 5/2/2020 for what was suspected to be an allergic reaction.  She called our on-call service on Saturday explaining that she was experiencing hand and face swelling.  She was instructed to proceed to the emergency department at which time she was assessed and prescribed a Medrol Dosepak.  She had just completed her 5-FU and was unhooked on Friday, 5/1/2020.      She reports since her ED visit on 5/2/2020 her symptoms have not improved. Her hands and feet were swollen upon presenting to the ED and she could barely make a fist. She states that she feels so swollen she is not able to stand it. She states that her skin on the hands and feet are peeling extensively as well, besides changing color to more dark.      She also reports significant fatigue after her first week of the 5-FU treatment but she expected this side effect. She also notices significant increase in her neuropathy to the point that she is not able to even walk around in her home without her house slippers due to irritation from her rugs. She denies and associated nausea or vomiting at this time. She also has episodes of epistaxis every day after the chemotherapy cycle #7.  She does report working full-time during the week of chemotherapy.      Laboratory studies, 5/13/2020, show moderate thrombocytopenia platelets 123,000, low normal WBC 4140 including ANC 2720 and normal hemoglobin 13.4.  Because significant hand-foot syndrome, will decrease both 5-FU and oxaliplatin by 50%, and   eliminate bolus dose of 5-FU.    MEDICATIONS    Current Outpatient Medications:   •  clonazePAM (KlonoPIN) 1 MG tablet, Take 1 tablet by mouth 3 (Three) Times a Day As Needed for Anxiety or Seizures., Disp: 90 tablet, Rfl: 0  •  dexamethasone (DECADRON) 0.1 % ophthalmic suspension, Administer 1 drop to both eyes Every 6 (Six) Hours As Needed (eye irritation/tearing)., Disp: 5 mL, Rfl: 3  •  escitalopram (LEXAPRO) 20 MG tablet, Take 1 tablet by mouth Daily., Disp: 90 tablet, Rfl: 3  •  famotidine (PEPCID) 20 MG tablet, TAKE 1 TABLET BY MOUTH TWICE A DAY, Disp: 60 tablet, Rfl: 1  •  Filgrastim-aafi (NIVESTYM) 480 MCG/0.8ML solution prefilled syringe, Inject 480 mcg as directed Daily. For 3 days after each chemotherapy cycle. Cycles are every 2 weeks., Disp: 4.8 mL, Rfl: 6  •  HYDROcodone-acetaminophen (NORCO) 7.5-325 MG per tablet, Take 1 tablet by mouth Every 4 (Four) Hours As Needed for Moderate Pain ., Disp: 180 tablet, Rfl: 0  •  Loratadine (CLARITIN) 10 MG capsule, Take  by mouth., Disp: , Rfl:   •  methylPREDNISolone (MEDROL, DAVID,) 4 MG tablet, Take as directed on package instructions., Disp: 21 each, Rfl: 0  •  ondansetron (ZOFRAN) 8 MG tablet, Take 1 tablet by mouth 3  (Three) Times a Day As Needed for Nausea or Vomiting., Disp: 60 tablet, Rfl: 5  •  potassium chloride (K-DUR,KLOR-CON) 20 MEQ CR tablet, Take 1 tablet by mouth Daily., Disp: 30 tablet, Rfl: 2  •  prochlorperazine (COMPAZINE) 10 MG tablet, Take 1 tablet by mouth Every 6 (Six) Hours As Needed for Nausea or Vomiting., Disp: 30 tablet, Rfl: 2  •  rivaroxaban (XARELTO) 20 MG tablet, Take 1 tablet by mouth Daily. Start after finishing starter pack. (Patient taking differently: Take 20 mg by mouth Daily. PT HOLDING 48 HOURS PRIOR TO SURGERY), Disp: 30 tablet, Rfl: 11    ALLERGIES:   No Known Allergies    SOCIAL HISTORY:       Social History     Tobacco Use   • Smoking status: Heavy Tobacco Smoker     Packs/day: 0.50     Years: 24.00     Pack years: 12.00     Types: Electronic Cigarette, Cigarettes   • Smokeless tobacco: Never Used   Substance Use Topics   • Alcohol use: Not Currently     Comment: Rarely   • Drug use: No         FAMILY HISTORY:   Mother has positive factor V Leiden mutation, although she did not have thrombosis, mother also is coronary disease with stenting, she also is occasional bruising.  Maternal grandmother had DVT, she had multiple surgical procedures.  Patient mother's half-brother had metastatic colon cancer at diagnosis in his 50s.  Maternal great aunt has breast cancer.  Patient will follow his skin cancer in his 60s, exclusive.    REVIEW OF SYSTEMS:  Review of Systems   Constitutional: Negative for appetite change, chills, diaphoresis, fatigue and fever.   HENT: Positive for nosebleeds. Negative for congestion, hearing loss, mouth sores and trouble swallowing.    Eyes: Negative for photophobia and visual disturbance.   Respiratory: Negative for cough, chest tightness and shortness of breath.    Cardiovascular: Negative for chest pain, palpitations and leg swelling.   Gastrointestinal: Negative for abdominal distention, abdominal pain, anal bleeding, constipation, diarrhea and nausea.   Endocrine:  "Negative for cold intolerance and heat intolerance.   Genitourinary: Negative for frequency and hematuria.        See HPI    Musculoskeletal: Negative for arthralgias, back pain and joint swelling.   Skin: Positive for color change. Negative for rash.        See hpi   Allergic/Immunologic: Positive for immunocompromised state (Secondary to adjuvant chemotherapy). Negative for environmental allergies and food allergies.   Neurological: Positive for numbness (cold exposure related to treatment). Negative for dizziness, speech difficulty and headaches.   Hematological: Negative for adenopathy. Does not bruise/bleed easily.   Psychiatric/Behavioral: Negative for agitation, behavioral problems, confusion and suicidal ideas.          Vitals:    05/27/20 0856   BP: 114/80   Pulse: 94   Resp: 16   Temp: 97.8 °F (36.6 °C)   SpO2: 96%   Weight: 99.7 kg (219 lb 14.4 oz)   Height: 166 cm (65.35\")   PainSc: 0-No pain     Current Status 5/27/2020   ECOG score 0     PHYSICAL EXAM:    CONSTITUTIONAL:  Vital signs reviewed.  Well-developed, well-nourished female, no distress, looks comfortable.  EYES:  Conjunctiva and lids unremarkable.  Pupils equal and round.  EARS,NOSE,MOUTH,THROAT:  Ears and nose appear unremarkable.  Lips appear unremarkable.  RESPIRATORY:  Normal respiratory effort.  Lungs clear to auscultation bilaterally.  CARDIOVASCULAR: Regular rhythm and rate.  Normal S1, S2.  No murmurs.  No significant lower extremity edema.  GASTROINTESTINAL: Abdomen no tender.  Ileostomy in the right lower abdomen.  Bowel sounds normal.  Nontender.     LYMPHATIC:  No cervical, supraclavicular lymphadenopathy.  MUSCULOSKELETAL:  Unremarkable gait and station.  No cyanosis or clubbing.  SKIN:  Warm.  No rashes.  Mild skin pigmentation involving her palms.  PSYCHIATRIC:  Normal judgment and insight.  Normal mood and affect.        RECENT LABS:  Results from last 7 days   Lab Units 05/27/20  0832   WBC 10*3/mm3 3.72   NEUTROS ABS 10*3/mm3 " 2.25   HEMOGLOBIN g/dL 12.6   HEMATOCRIT % 39.5   PLATELETS 10*3/mm3 163     Results from last 7 days   Lab Units 05/27/20  0832   SODIUM mmol/L 138   POTASSIUM mmol/L 3.9   CHLORIDE mmol/L 106   CO2 mmol/L 20.7*   BUN mg/dL 8   CREATININE mg/dL 0.63   CALCIUM mg/dL 8.8   ALBUMIN g/dL 3.30*   BILIRUBIN mg/dL <0.2*   ALK PHOS U/L 113   ALT (SGPT) U/L 36*   AST (SGOT) U/L 27   GLUCOSE mg/dL 164*           IMAGING STUDY:  F-18 FDG PET FROM SKULL BASE TO MID THIGH WITH PET/CT FUSION 5/21/2020     HISTORY: 39-year-old female with resected rectal cancer. Low anterior  resection and diverting loop ileostomy on 12/02/2019. Restaging.     TECHNIQUE: Radiation dose reduction techniques were utilized, including  automated exposure control and exposure modulation based on body size.   Blood glucose level at time of injection was 84 mg/dL.  6.1 mCi of F-18  FDG were injected and PET was performed from skull base to mid thigh. CT  was obtained for localization and attenuation correction. Time at  injection 12:28 PM. PET start time 1:49 PM. Compared with CT of the  abdomen and pelvis from 04/24/2020 and previous CTs from 03/13/2020.  Also compared with PET/CT from 08/08/2019.     FINDINGS: The approximately 2.5 cm air-fluid collection in the presacral  region which appears to communicate with the adjacent primary colonic  anastomosis is hypermetabolic as is the adjacent anastomosed colonic  segment. The maximal SUV is 12.7. No hypermetabolic lymphadenopathy is  seen. There is a speckled pattern of activity throughout the entire  liver and spleen. There is hypermetabolic activity at an approximately 3  cm focus of mesenteric fat necrosis, image 24. No suspicious foci are  seen within the abdomen. There is no suspicious hypermetabolic activity  within the chest or neck. There is continued stability of a 6 mm right  upper lobe pulmonary nodule and stability seen since chest CT from  07/19/2019. There is a diffuse increase in marrow  activity.     IMPRESSION:  1. The hypermetabolic activity at the approximately 2.5 cm air-fluid  collection adjacent to the adjacent primary colonic anastomosis likely  reflects chronic inflammation/abscess, but there is otherwise no  abnormal hypermetabolic activity to suggest metastatic disease within  the abdomen or pelvis.  2. Stable 6 mm right upper lobe pulmonary nodule. There is no convincing  evidence for metastatic disease within the chest or neck.  3. Diffuse increase in marrow activity is likely chemotherapy related.      Assessment/Plan      ASSESSMENT:   1.  Newly diagnosed rectal cancer, rectal ultrasound examination reported T3N0 disease without lymphadenopathy. CT scan of chest, abdomen and pelvis reported no lymphadenopathy in the abdomen, pelvis or chest. She does have small pulmonary nodule 6-7 mm and 2 mm with indeterminate feature. There is no solid evidence of metastatic disease otherwise.   · Based on the CT scan and rectal ultrasound imaging studies, this patient had stage IIA (T3N0M0) disease.    · She had PET scan examination on 8/8/2019 which reported a hypermetabolic right upper lobe pulmonary nodule 6 mm with SUV 5.6.  This is suspicious for metastatic disease.  This patient may have stage IV disease.   · She initiated concurrent radiation with continuous 5-FU on 8/12/2019.  · Patient finished concurrent chemoradiation therapy.  · Patient underwent surgical resection of the rectal tumor and diverting loop ileostomy on 12/2/2019 with Dr. Ye.  Pathology evaluation reported residual T3 disease, with 1 out of 14 lymph nodes positive for malignancy.  Certainly this patient has at least stage IIIb rectal cancer (T3 N1 M0?)  · On 1/22/2020 adjuvant chemotherapy FOLFOX 6 regimen initiated.    · On 2/5/2022 cycle #2 was delayed secondary to neutropenia.  She was given 3 days of Granix.   · Emend added with cycle 3.  With improved nausea control  · Continuing home Granix x3 days following 5-FU  disconnect  · 3/11/2020 due for cycle 4, however, she is experiencing progressive abdominal pain and occasional fevers.   · CT scan performed on 3/13/2020 reveals no evidence of progressive or recurrent disease.  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  · Patient hospitalized 4/24-4/26/20 after cycle 5 of chemotherapy with acute UTI.  CT abdomen/pelvis noting fluid collection in the presacral region having diminished in size compared to CT dated 3/13/2020.  Patient was evaluated by Dr. Ye while in the hospital with further plans to evaluate possible colovaginal fistula following completion of chemotherapy.  Patient did respond to IV antibiotics and discharged home on oral cefdinir.  · 4/29/2020 cycle 6 of FOLFOX.  Urinary symptoms have resolved   · Patient seen in the [facility] [place] ED on 5/2/2020 for what was suspected to be an allergic reaction.  She called our service on Saturday explaining that she was experiencing hand and face swelling.  She was instructed to proceed to the emergency department at which time she was assessed and prescribed a Medrol Dosepak.  She had just completed her 5-FU and was unhooked on Friday, 5/1/2020.  Her symptoms have resolved in the office on assessment, 5/5/2020.  · The patient had grade 3 hand-foot syndrome based on symptomology.  Patient had cycle #8 of 5-FU on 5/13/2020. Due to her symptoms and poor tolerance to the 5-FU, her treatment dose will be reduced 50% for oxaliplatin and infusional 5-FU.  Bolus 5-FU will be discontinued..  · On 5/13/2020  We discussed further treatment options pending the scan results.  If she has indeed residual disease or metastatic disease, we will switch her to irinotecan plus Avastin or anti-EGFR monoclonal antibody.  She will need a further molecular testing of her tumor samples in that case.  · On 5/21/2020 patient had a PET scan and it showed no evidence of residual disease in abdomen or pelvis.  There was fluid accumulation/abscess.  No  "evidence for metastatic disease in chest and neck.  · Today on 5/27/2020 I discussed with the patient that we can discontinue chemotherapy at this time.  She will follow-up with Dr. Ye to discuss a possibility to reverse the ileostomy.  We likely will obtain anther PET scan in 3 to 4 months for reassessment.  · She will follow-up with me after I talk to .     2 .  Pulmonary nodule, hypermetabolic on previous PET scan.   · Her PET scan examination from 8/8/2019 reported a small 8 mm right upper apex pulmonary nodule but hypermetabolic SUV 5.1.  That was too small to be biopsied, however there was always a possibility of metastatic disease considering that high activity despite a such a small nodule.  · Her chest CT scan examination from 3/13/2020 reported this shrank to 6 mm.    · PET scan examination on 5/21/2020 reported positive pool activity at this residual nodule.    3.   Pulmonary emboli with background of positive factor V Leiden mutation   · The patient has heterozygous factor V Leiden mutation and therefore was on low-dose anticoagulation with Lovenox, 40 mg daily given her increased risk of thrombosis with MediPort and GI malignancy.    · Nevertheless this patient was found to having pulmonary emboli on 9/20/2019 despite low-dose Lovenox.  She had a brief hospitalization in late September 2019, she was started on full dose Xarelto anticoagulation per protocol.    · The patient continues on Xarelto and is tolerating this well.  Is having some issues with slight nosebleeds as outlined above.  Patient to call with worsening.     4.  Loose, frequent stools:   · Patient is s/p surgical resection of the rectum tumor and diverting loop ileostomy on 12/2/2019 with Dr. Ye.  · She reports having liquid stools in her colostomy bag.  She reports her liquid stools typically appear \"clear mustard yellow\" or dark.  · On 4/1/2020 patient was recommended to start on Bentyl    · Patient notes output has " stabilized and is more pasty which is baseline for her.    5. Pelvic pain: Related to previous radiation therapy and the surgery for resection of the primary rectal cancer.  Stable see above.    6. Hypokalemia: .  · Potassium was 4.7 on 5/5/2020  · Potassium is 3.8 on 5/13/2020.  · Potassium is 3.9 on 5/27/2020    7.  This patient also has strong family history for malignancy the patient had appointment with genetic counseling on September 3, 2019. NF1 c587 3C >T.    8.  Nausea: This does seem to be multifactorial with a large part due to anxiety.  The patient does continue on Lexapro at 20 mg daily as well as Klonopin 0.5 mg once daily as needed.      · Emend was added with cycle 3 with much better management of nausea  · She can continue to utilize Zofran and Compazine as needed  · Is not experiencing nausea today.     9.  Mild anemia, improved since her surgery.    · She also has a history of iron deficiency.  Iron studies reveal a saturation of just 10%.  She was started on oral iron but was unable to tolerate due to GI side effects.   · Status post Injectafer 2/5/2020 and 2/12/2020   · Hemoglobin was within normal limits on 4/1/2020 at 13.1  · Hemoglobin 13.4 on 5/2/2020  · Hemoglobin is 13.4 on 5/13/2020  · Hemoglobin stable at 12.6 on 5/27/2020     10.  Neutropenia secondary to chemotherapy.  · As noted above, the patient will receive 3 days of Granix begin on day 4 of each cycle of chemotherapy.  We obtained approval for self administration at home.  This started following cycle 3 chemotherapy  · Total white count on 4/1/2020 was 3930, ANC 2540  · Total white count on 5/2/2020 was 3780 with an ANC of 2700.  · Total white count on 5/13/2020 was 4140 with an ANC of 2720.  · Total white count on 5/27/2020 was 3720 with an ANC of 2250.  Discussed with the patient, no need for further G-CSF.    11.  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    · Jaison Ye and Patrick spoke regarding CT scan and felt OK to proceed with  treatment.    · Patient saw Dr. Ye during brief hospitalization 4/24-4/26 with repeat CT scan showing improvement of fluid collection in the presacral region.   · PET scan on 5/21/2020 reported residual fluid collection, possible fistula.      12.  Cold sensitivity secondary to oxaliplatin  · Lasting 10 days to 2 weeks with numbness almost constantly.  · Patient began B complex on 4/1/2020  · Continue B complex.    · Patient has no specific complaints on 5/27/2020.     13.  Peripheral neuropathy secondary to chemotherapy.  This is mild involving bilateral hands.  However her cholecystectomy actually last longer.      14.  Hand-foot syndrome grade 3.    · It become so significant after cycle #7 FOLFOX treatment.  Discussed with patient on 5/13/2020, will discontinue the bolus 5-FU dose, and also decrease 50% of the infusion dose through the pump.   · We also use Medrol Dosepak for possible recurrent symptomology.  She responded this well.   · Her hand-foot syndrome is improving.     15.  Hyperlacrimation and mild scleral irritation related to 5-FU.  She has been taking steroid eyedrops.   This has improved her hyperlacrimation.     16.  Mild epistaxis.  Patient's platelet count did drop to 73,000 during hospitalization and has improved to 117,000 5/5/2020.  Patient advised to monitor nosebleeds and call with worsening symptoms.  She will continue her Xarelto for now.  She has not experienced any nosebleeds in the past several days.   ·  On 5/13/2020 patient reports she is having in increase in her episodes of epistaxis from the chemotherapy.  There is no visible epistaxis in the clinic.  Continue to monitor for now.      17.  Thrombocytopenia secondary to chemotherapy.  · Patient has mildly low platelets 123,000 on 5/13/2020.  Patient is asymptomatic.  · Improved platelets 163,000 5/27/2020.  Continue to monitor.      PLAN:  1. Patient can discontinue chemotherapy at this time.  She finished 8 cycles of adjuvant  FOLFOX 6.  2. She can also discontinue G-CSF injections.  3. Patient will follow-up with Dr. Ye with discussion of take down of her ileostomy.  4. She will continue Xarelto as prescribed.  5. Continue oral potassium chloride.  6. Follow-up with me after discussion with  regarding ileostomy.  We probably need to have another PET scan examination down the line.   7. I have asked the patient to call the office should she experience new or worsening symptoms.       I personally reviewed the images of her PET scan from 5/21/2020.  I shared those images with the patient today.      I am waiting for Dr. Ye to call me back.     By signing my name here, I Aric Smith, attest that all documentation on 05/27/20 at 13:01 has been prepared under the direction and in the presence of Dr. Alexandru Nguyen MD.    I reviewed the note scribed by Aric Smith and made appropriate corrections.      ALEXANDRU NGUYEN M.D., Ph.D.        CC:  Deepika Vela III, Nechol MD Carrie B. Scharf, MD

## 2020-06-10 RX ORDER — FAMOTIDINE 20 MG/1
TABLET, FILM COATED ORAL
Qty: 180 TABLET | Refills: 1 | Status: SHIPPED | OUTPATIENT
Start: 2020-06-10 | End: 2021-03-22

## 2020-06-19 ENCOUNTER — OFFICE VISIT (OUTPATIENT)
Dept: SURGERY | Facility: CLINIC | Age: 40
End: 2020-06-19

## 2020-06-19 ENCOUNTER — TELEPHONE (OUTPATIENT)
Dept: ONCOLOGY | Facility: CLINIC | Age: 40
End: 2020-06-19

## 2020-06-19 VITALS
TEMPERATURE: 97.7 F | DIASTOLIC BLOOD PRESSURE: 78 MMHG | WEIGHT: 223.6 LBS | SYSTOLIC BLOOD PRESSURE: 110 MMHG | OXYGEN SATURATION: 96 % | HEART RATE: 114 BPM | BODY MASS INDEX: 35.93 KG/M2 | HEIGHT: 66 IN

## 2020-06-19 DIAGNOSIS — C20 RECTAL CANCER (HCC): Primary | ICD-10-CM

## 2020-06-19 PROCEDURE — 99212 OFFICE O/P EST SF 10 MIN: CPT | Performed by: COLON & RECTAL SURGERY

## 2020-06-19 RX ORDER — SODIUM CHLORIDE, SODIUM LACTATE, POTASSIUM CHLORIDE, CALCIUM CHLORIDE 600; 310; 30; 20 MG/100ML; MG/100ML; MG/100ML; MG/100ML
30 INJECTION, SOLUTION INTRAVENOUS CONTINUOUS
Status: CANCELLED | OUTPATIENT
Start: 2020-07-15

## 2020-06-19 NOTE — PROGRESS NOTES
"Carole Weaver is a 39 y.o. female in for follow up of Rectal cancer (CMS/HCC)    Difficulty with chemo amanda last couple doses.  Done with treatment.  Emptying pouch 4-6x/day.  No drainage from vagina  No rectal pressure        /78 (BP Location: Left arm, Patient Position: Sitting, Cuff Size: Adult)   Pulse 114   Temp 97.7 °F (36.5 °C) (Temporal)   Ht 167.6 cm (66\")   Wt 101 kg (223 lb 9.6 oz)   LMP  (LMP Unknown)   SpO2 96%   Breastfeeding No   BMI 36.09 kg/m²   Body mass index is 36.09 kg/m².      PE:  Physical Exam   Constitutional: She appears well-developed. No distress.   HENT:   Head: Normocephalic and atraumatic.   Abdominal: Soft. She exhibits no distension.   Ileo ppp, rlq   Musculoskeletal: Normal range of motion.   Neurological: She is alert.   Psychiatric: Thought content normal.         Assessment:   1. Rectal cancer (CMS/HCC)         Plan:  gge to delineate if rv fistula from abscess from lar  Discussed briefly would mean redo lar if leak       "

## 2020-06-24 ENCOUNTER — OFFICE VISIT (OUTPATIENT)
Dept: ONCOLOGY | Facility: CLINIC | Age: 40
End: 2020-06-24

## 2020-06-24 ENCOUNTER — INFUSION (OUTPATIENT)
Dept: ONCOLOGY | Facility: HOSPITAL | Age: 40
End: 2020-06-24

## 2020-06-24 ENCOUNTER — APPOINTMENT (OUTPATIENT)
Dept: LAB | Facility: HOSPITAL | Age: 40
End: 2020-06-24

## 2020-06-24 VITALS
RESPIRATION RATE: 16 BRPM | OXYGEN SATURATION: 95 % | HEIGHT: 65 IN | DIASTOLIC BLOOD PRESSURE: 85 MMHG | WEIGHT: 224.2 LBS | BODY MASS INDEX: 37.36 KG/M2 | TEMPERATURE: 97.7 F | HEART RATE: 90 BPM | SYSTOLIC BLOOD PRESSURE: 138 MMHG

## 2020-06-24 DIAGNOSIS — C20 ADENOCARCINOMA OF RECTUM (HCC): Primary | ICD-10-CM

## 2020-06-24 DIAGNOSIS — E87.6 HYPOKALEMIA: ICD-10-CM

## 2020-06-24 DIAGNOSIS — I26.99 OTHER PULMONARY EMBOLISM WITHOUT ACUTE COR PULMONALE, UNSPECIFIED CHRONICITY (HCC): ICD-10-CM

## 2020-06-24 DIAGNOSIS — Z79.01 CHRONIC ANTICOAGULATION: Chronic | ICD-10-CM

## 2020-06-24 DIAGNOSIS — R91.1 PULMONARY NODULE, RIGHT: ICD-10-CM

## 2020-06-24 DIAGNOSIS — Z45.2 ENCOUNTER FOR FITTING AND ADJUSTMENT OF VASCULAR CATHETER: ICD-10-CM

## 2020-06-24 DIAGNOSIS — L27.1 HAND FOOT SYNDROME: ICD-10-CM

## 2020-06-24 LAB
ALBUMIN SERPL-MCNC: 3.5 G/DL (ref 3.5–5.2)
ALBUMIN/GLOB SERPL: 1 G/DL (ref 1.1–2.4)
ALP SERPL-CCNC: 91 U/L (ref 38–116)
ALT SERPL W P-5'-P-CCNC: 28 U/L (ref 0–33)
ANION GAP SERPL CALCULATED.3IONS-SCNC: 7.9 MMOL/L (ref 5–15)
AST SERPL-CCNC: 22 U/L (ref 0–32)
BASOPHILS # BLD AUTO: 0.02 10*3/MM3 (ref 0–0.2)
BASOPHILS NFR BLD AUTO: 0.3 % (ref 0–1.5)
BILIRUB SERPL-MCNC: 0.2 MG/DL (ref 0.2–1.2)
BUN BLD-MCNC: 11 MG/DL (ref 6–20)
BUN/CREAT SERPL: 17.7 (ref 7.3–30)
CALCIUM SPEC-SCNC: 9 MG/DL (ref 8.5–10.2)
CHLORIDE SERPL-SCNC: 108 MMOL/L (ref 98–107)
CO2 SERPL-SCNC: 23.1 MMOL/L (ref 22–29)
CREAT BLD-MCNC: 0.62 MG/DL (ref 0.6–1.1)
DEPRECATED RDW RBC AUTO: 54.4 FL (ref 37–54)
EOSINOPHIL # BLD AUTO: 0.96 10*3/MM3 (ref 0–0.4)
EOSINOPHIL NFR BLD AUTO: 16.4 % (ref 0.3–6.2)
ERYTHROCYTE [DISTWIDTH] IN BLOOD BY AUTOMATED COUNT: 14 % (ref 12.3–15.4)
GFR SERPL CREATININE-BSD FRML MDRD: 107 ML/MIN/1.73
GLOBULIN UR ELPH-MCNC: 3.4 GM/DL (ref 1.8–3.5)
GLUCOSE BLD-MCNC: 77 MG/DL (ref 74–124)
HCT VFR BLD AUTO: 41.4 % (ref 34–46.6)
HGB BLD-MCNC: 13.4 G/DL (ref 12–15.9)
IMM GRANULOCYTES # BLD AUTO: 0.02 10*3/MM3 (ref 0–0.05)
IMM GRANULOCYTES NFR BLD AUTO: 0.3 % (ref 0–0.5)
LYMPHOCYTES # BLD AUTO: 0.83 10*3/MM3 (ref 0.7–3.1)
LYMPHOCYTES NFR BLD AUTO: 14.1 % (ref 19.6–45.3)
MCH RBC QN AUTO: 34.3 PG (ref 26.6–33)
MCHC RBC AUTO-ENTMCNC: 32.4 G/DL (ref 31.5–35.7)
MCV RBC AUTO: 105.9 FL (ref 79–97)
MONOCYTES # BLD AUTO: 0.51 10*3/MM3 (ref 0.1–0.9)
MONOCYTES NFR BLD AUTO: 8.7 % (ref 5–12)
NEUTROPHILS # BLD AUTO: 3.53 10*3/MM3 (ref 1.7–7)
NEUTROPHILS NFR BLD AUTO: 60.2 % (ref 42.7–76)
NRBC BLD AUTO-RTO: 0 /100 WBC (ref 0–0.2)
PLATELET # BLD AUTO: 210 10*3/MM3 (ref 140–450)
PMV BLD AUTO: 8.3 FL (ref 6–12)
POTASSIUM BLD-SCNC: 4.1 MMOL/L (ref 3.5–4.7)
PROT SERPL-MCNC: 6.9 G/DL (ref 6.3–8)
RBC # BLD AUTO: 3.91 10*6/MM3 (ref 3.77–5.28)
SODIUM BLD-SCNC: 139 MMOL/L (ref 134–145)
WBC NRBC COR # BLD: 5.87 10*3/MM3 (ref 3.4–10.8)

## 2020-06-24 PROCEDURE — 99214 OFFICE O/P EST MOD 30 MIN: CPT | Performed by: INTERNAL MEDICINE

## 2020-06-24 PROCEDURE — 36591 DRAW BLOOD OFF VENOUS DEVICE: CPT

## 2020-06-24 PROCEDURE — 85025 COMPLETE CBC W/AUTO DIFF WBC: CPT

## 2020-06-24 PROCEDURE — 80053 COMPREHEN METABOLIC PANEL: CPT

## 2020-06-24 PROCEDURE — 25010000003 HEPARIN LOCK FLUSH PER 10 UNITS: Performed by: INTERNAL MEDICINE

## 2020-06-24 RX ORDER — HEPARIN SODIUM (PORCINE) LOCK FLUSH IV SOLN 100 UNIT/ML 100 UNIT/ML
500 SOLUTION INTRAVENOUS AS NEEDED
Status: CANCELLED | OUTPATIENT
Start: 2020-06-24

## 2020-06-24 RX ORDER — HEPARIN SODIUM (PORCINE) LOCK FLUSH IV SOLN 100 UNIT/ML 100 UNIT/ML
500 SOLUTION INTRAVENOUS AS NEEDED
Status: DISCONTINUED | OUTPATIENT
Start: 2020-06-24 | End: 2020-06-24 | Stop reason: HOSPADM

## 2020-06-24 RX ORDER — SODIUM CHLORIDE 0.9 % (FLUSH) 0.9 %
10 SYRINGE (ML) INJECTION AS NEEDED
Status: CANCELLED | OUTPATIENT
Start: 2020-06-24

## 2020-06-24 RX ORDER — SODIUM CHLORIDE 0.9 % (FLUSH) 0.9 %
10 SYRINGE (ML) INJECTION AS NEEDED
Status: DISCONTINUED | OUTPATIENT
Start: 2020-06-24 | End: 2020-06-24 | Stop reason: HOSPADM

## 2020-06-24 RX ADMIN — Medication 10 ML: at 09:22

## 2020-06-24 RX ADMIN — SODIUM CHLORIDE, PRESERVATIVE FREE 500 UNITS: 5 INJECTION INTRAVENOUS at 09:22

## 2020-06-24 NOTE — PROGRESS NOTES
CT of chest, abdomen and pelvis with contrast, CBC, CMP and CEA to be done one week prior to MD follow up in 3 months per notes Dr. Nguyen

## 2020-07-01 ENCOUNTER — OFFICE VISIT (OUTPATIENT)
Dept: INTERNAL MEDICINE | Facility: CLINIC | Age: 40
End: 2020-07-01

## 2020-07-01 VITALS
HEIGHT: 65 IN | SYSTOLIC BLOOD PRESSURE: 110 MMHG | DIASTOLIC BLOOD PRESSURE: 82 MMHG | HEART RATE: 97 BPM | WEIGHT: 226 LBS | OXYGEN SATURATION: 98 % | BODY MASS INDEX: 37.65 KG/M2

## 2020-07-01 DIAGNOSIS — I26.99 OTHER PULMONARY EMBOLISM WITHOUT ACUTE COR PULMONALE, UNSPECIFIED CHRONICITY (HCC): Primary | ICD-10-CM

## 2020-07-01 DIAGNOSIS — F33.9 MONOPOLAR DEPRESSION (HCC): ICD-10-CM

## 2020-07-01 DIAGNOSIS — D68.51 HETEROZYGOUS FACTOR V LEIDEN MUTATION (HCC): ICD-10-CM

## 2020-07-01 DIAGNOSIS — C20 ADENOCARCINOMA OF RECTUM (HCC): ICD-10-CM

## 2020-07-01 PROCEDURE — 99213 OFFICE O/P EST LOW 20 MIN: CPT | Performed by: NURSE PRACTITIONER

## 2020-07-01 NOTE — PROGRESS NOTES
Name: Carole Weaver  :  1980    Subjective:      Chief Complaint   Patient presents with   • Pulmonary Embolism     6 mo follow up   • Depression   • Rectal Cancer        Carole Weaver is a 39 y.o. female patient.  She has multiple chronic medical conditions including: IBS, Anxiety     Diarrhea started at 36 yoa was being treated for IBS.   Had colonoscopy just at 39 and adenocarcinoma at mid rectum.  She is now followed by Dr Ye and Dr Nguyen.      Rectal cancer dx 2019 , rectal ultrasound examination reported T3N0 disease without lymphadenopathy. She had radiation and 5-FU.  States she tolerated this well.  She underwent surgical resection of the colon and diverting loop ileostomy on 2019. She has now completed FOLFOX 6.   She will have gastrografin enema tomorrow. There is a possible fistula.  If a fistula, she will have to undergo another surgery.  If not, she can precede to reversal.  Her overall mood is much better.     Her pain is controlled on current rx and she is using norco as prescribed.      Pulmonary emboli on 2019 while being treated with  low-dose Lovenox.  She is postivie for factor V Leiden (her mother also positive). Currently on  Xarelto anticoagulation. She is tolerating well, no s/sx bleeding.      I have reviewed the patient's medical history in detail and updated the computerized patient record.    Past Medical History:   Diagnosis Date   • Abnormal Pap smear of cervix 1998    JULIUS I   • Anemia in pregnancy    • Anxiety    • Cervical lymphadenitis 2012    SEEN AT [facility] ER   • Chronic diarrhea    • Colon polyps     FOLLOWED BY DR. GERONIMO YE   • Depression    • Factor V Leiden (CMS/HCC)     DX 2019   • GERD (gastroesophageal reflux disease)    • Heart murmur     ?   • Hematochezia    • Hemorrhoids    • History of chemotherapy     FOLLOWED BY DR. ALEXANDRU NGUYEN   • History of gestational diabetes    • History of pre-eclampsia    • History of  radiation therapy     FOLLOWED BY DR. JAVON LEWIS   • History of TB skin testing 2009    TB Skin Test   • HPV in female    • IBS (irritable bowel syndrome)    • Infected insect bite of neck 2016    SEEN AT [facility]   • Irritable bowel syndrome    • Kidney stones 2007    SEEN AT [facility] ER   • Lumbar disc disease    • On anticoagulant therapy    • PIH (pregnancy induced hypertension)    • Pulmonary embolism (CMS/HCC) 2019    ADMITTED TO [facility]   • Rectal cancer (CMS/HCC) 2019    INVASIVE MODERATELY DIFFERENTIATED ADENOCARCINOMA, CHEMO AND XRT FINISHED 2019   • Right leg pain    • Right ureteral stone 10/01/2019    SEEN AT [facility] ER   • SAB (spontaneous ) 1996     A2-1 INDUCED   • Sepsis due to cellulitis (CMS/HCC) 2002    LEFT GREAT TOE, ADMITTED TO [facility]       Past Surgical History:   Procedure Laterality Date   • CHOLECYSTECTOMY N/A 10/10/2003    LAPAROSCOPIC WITH CHOLANGIOGRAM, DR. JAMEY TALAVERA AT [facility]   • COLON RESECTION N/A 2019    Procedure: laparoscopic low anterior colon resection with mobilization of splenic flexure and diverting loop ileostomy: ERAS;  Surgeon: Geronimo Ye MD;  Location: [facility];  Service: General   • COLONOSCOPY W/ POLYPECTOMY N/A 2019    15 MM TUBULOVILLOUS ADENOMA POLYP IN HEPATIC FLEXURE, 20 MMTUBULOVILLOUS ADENOMA WITH HIGH GRADE DYSPLASIA IN RECTOSIGMOID, 4 CM MASS IN MID RECTUM, PATH: INVASIVE MODERATELY DIFFERENTIATED ADENOCARCINOMA, DR. JENNIFER LI AT [facility]   • DILATATION AND EVACUATION N/A    • ENDOSCOPY N/A 2019    LA GRADE A ESOPHAGITIS, GASTRITIS, ALL BIOPSIES BENIGN, DR. JENNIFER LI AT [facility]   • PAP SMEAR N/A 2014   • SIGMOIDOSCOPY N/A 2019    INFILTRATIVE PARTIALLY OBSTRUCTING LARGE RECTAL CANCER, AREA TATOOED, DR. GERONIMO YE AT [facility]   • SIGMOIDOSCOPY N/A 2019    AN ULCERATED PARTIALLY OBSTRUCTING MASS IN MID RECTUM, AREA TATTOOED,  DR. GERONIMO ESPARZA AT [facility]   • TRANSRECTAL ULTRASOUND N/A 7/24/2019    Procedure: ULTRASOUND TRANSRECTAL;  Surgeon: Geronimo Esparza MD;  Location: [facility] ENDOSCOPY;  Service: General   • URETEROSCOPY LASER LITHOTRIPSY WITH STENT INSERTION Right 10/2019   • VAGINAL DELIVERY N/A 09/18/1998   • VENOUS ACCESS DEVICE (PORT) INSERTION Left 8/9/2019    Procedure: INSERTION VENOUS ACCESS DEVICE;  Surgeon: Sj Joseph MD;  [place] [facility] [place] [facility]  Service: General   • WISDOM TOOTH EXTRACTION  1993       Family History   Problem Relation Age of Onset   • Hyperlipidemia Mother    • Colon polyps Mother    • Heart disease Mother    • Hypertension Mother    • Factor V Leiden deficiency Mother    • Skin cancer Father         Squamous in 60s   • Heart disease Paternal Grandfather    • No Known Problems Son    • Cancer Paternal Uncle    • Colon cancer Paternal Uncle    • Diabetes Paternal Uncle    • Mental illness Sister         Addiction   • Colon cancer Maternal Uncle    • Malig Hyperthermia Neg Hx        Social History     Socioeconomic History   • Marital status:      Spouse name: Justus   • Number of children: 1   • Years of education: College   • Highest education level: Not on file   Occupational History   • Occupation:      Comment: [employer]     Employer: [employer]   Tobacco Use   • Smoking status: Heavy Tobacco Smoker     Packs/day: 0.50     Years: 24.00     Pack years: 12.00     Types: Electronic Cigarette, Cigarettes   • Smokeless tobacco: Never Used   Substance and Sexual Activity   • Alcohol use: Not Currently     Comment: Rarely   • Drug use: No   • Sexual activity: Defer       Most Recent Immunizations   Administered Date(s) Administered   • flucelvax quad pfs =>4 YRS 11/08/2018         Review of Systems:   Review of Systems   Constitutional: Negative for unexpected weight change.   Respiratory: Negative for shortness of breath.    Cardiovascular: Negative for chest pain.  "  Gastrointestinal: Negative for blood in stool and nausea.        Colostomy - loose stools    Genitourinary: Negative for difficulty urinating.         Objective:      Physical Exam:   Physical Exam   Constitutional: She is oriented to person, place, and time. She appears well-developed. She is cooperative.   Cardiovascular: Normal rate, regular rhythm, normal heart sounds, intact distal pulses and normal pulses.   Pulmonary/Chest: Effort normal and breath sounds normal. She exhibits no deformity.   Equal, Unlabored   Abdominal: Soft. Bowel sounds are normal.   Neurological: She is alert and oriented to person, place, and time.   Skin: Capillary refill takes 2 to 3 seconds.   Psychiatric: She has a normal mood and affect. Her behavior is normal. Judgment and thought content normal.   Vitals reviewed.        Vital Signs:  Vitals:    07/01/20 1455   BP: 110/82   Pulse: 97   SpO2: 98%   Weight: 103 kg (226 lb)   Height: 166 cm (65.35\")     Body mass index is 37.2 kg/m².      Results Review:      REVIEWED AND DISCUSSED LAB RESULTS WITH PATIENT      Requested Prescriptions      No prescriptions requested or ordered in this encounter     Routine medications provided by this office will also be refilled via pharmacy request.       Current Outpatient Medications:   •  clonazePAM (KlonoPIN) 1 MG tablet, Take 1 tablet by mouth 3 (Three) Times a Day As Needed for Anxiety or Seizures., Disp: 90 tablet, Rfl: 0  •  escitalopram (LEXAPRO) 20 MG tablet, Take 1 tablet by mouth Daily., Disp: 90 tablet, Rfl: 3  •  famotidine (PEPCID) 20 MG tablet, TAKE 1 TABLET BY MOUTH TWICE A DAY, Disp: 180 tablet, Rfl: 1  •  HYDROcodone-acetaminophen (NORCO) 7.5-325 MG per tablet, Take 1 tablet by mouth Every 4 (Four) Hours As Needed for Moderate Pain ., Disp: 180 tablet, Rfl: 0  •  Loratadine (CLARITIN) 10 MG capsule, Take  by mouth., Disp: , Rfl:   •  ondansetron (ZOFRAN) 8 MG tablet, Take 1 tablet by mouth 3 (Three) Times a Day As Needed for " "Nausea or Vomiting., Disp: 60 tablet, Rfl: 5  •  prochlorperazine (COMPAZINE) 10 MG tablet, Take 1 tablet by mouth Every 6 (Six) Hours As Needed for Nausea or Vomiting., Disp: 30 tablet, Rfl: 2  •  rivaroxaban (XARELTO) 20 MG tablet, Take 1 tablet by mouth Daily. Start after finishing starter pack. (Patient taking differently: Take 20 mg by mouth Daily. PT HOLDING 48 HOURS PRIOR TO SURGERY), Disp: 30 tablet, Rfl: 11       Assessment and Plan:        Problem List Items Addressed This Visit        Cardiovascular and Mediastinum    Other pulmonary embolism without acute cor pulmonale (CMS/HCC) - Primary       Digestive    Adenocarcinoma of rectum (CMS/HCC)       Hematopoietic and Hemostatic    Heterozygous factor V Leiden mutation (CMS/HCC)       Other    Monopolar depression (CMS/HCC)           Continue chronic AC - stable on xarelto.   Depression improved on lexapro.   Pain controlled with norco - continue to use lowest effective dose.     Discussed any change in Rx and discussed visit with patient.  All questions answered.      Return in about 3 months (around 10/1/2020).    Red \"Miller\" Mirian, JULIET   07/01/20    Dragon disclaimer:   Much of this encounter note is an electronic transcription/translation of spoken language to printed text. The electronic translation of spoken language may permit erroneous, or at times, nonsensical words or phrases to be inadvertently transcribed; Although I have reviewed the note for such errors, some may still exist.     Additional Patient Counseling:       There are no Patient Instructions on file for this visit.  "

## 2020-07-02 ENCOUNTER — HOSPITAL ENCOUNTER (OUTPATIENT)
Dept: GENERAL RADIOLOGY | Facility: HOSPITAL | Age: 40
Discharge: HOME | End: 2020-07-02
Admitting: COLON & RECTAL SURGERY

## 2020-07-02 DIAGNOSIS — C20 RECTAL CANCER (HCC): ICD-10-CM

## 2020-07-02 PROCEDURE — 74270 X-RAY XM COLON 1CNTRST STD: CPT

## 2020-07-02 PROCEDURE — 0 DIATRIZOATE MEGLUMINE & SODIUM PER 1 ML: Performed by: COLON & RECTAL SURGERY

## 2020-07-02 RX ADMIN — DIATRIZOATE MEGLUMINE AND DIATRIZOATE SODIUM 480 ML: 660; 100 LIQUID ORAL; RECTAL at 09:18

## 2020-07-06 ENCOUNTER — TRANSCRIBE ORDERS (OUTPATIENT)
Dept: SLEEP MEDICINE | Facility: HOSPITAL | Age: 40
End: 2020-07-06

## 2020-07-06 DIAGNOSIS — Z01.818 OTHER SPECIFIED PRE-OPERATIVE EXAMINATION: Primary | ICD-10-CM

## 2020-07-09 DIAGNOSIS — C20 ADENOCARCINOMA OF RECTUM (HCC): ICD-10-CM

## 2020-07-09 DIAGNOSIS — F41.9 ANXIETY: ICD-10-CM

## 2020-07-10 RX ORDER — CLONAZEPAM 1 MG/1
1 TABLET ORAL 3 TIMES DAILY PRN
Qty: 90 TABLET | Refills: 0 | Status: SHIPPED | OUTPATIENT
Start: 2020-07-10 | End: 2020-10-09 | Stop reason: SDUPTHER

## 2020-07-13 ENCOUNTER — LAB (OUTPATIENT)
Dept: LAB | Facility: HOSPITAL | Age: 40
End: 2020-07-13

## 2020-07-13 DIAGNOSIS — Z01.818 OTHER SPECIFIED PRE-OPERATIVE EXAMINATION: ICD-10-CM

## 2020-07-13 PROBLEM — C20 RECTAL CANCER (HCC): Status: ACTIVE | Noted: 2020-06-19

## 2020-07-13 PROBLEM — C20 RECTAL CANCER: Status: RESOLVED | Noted: 2020-06-19 | Resolved: 2020-07-13

## 2020-07-13 PROCEDURE — C9803 HOPD COVID-19 SPEC COLLECT: HCPCS

## 2020-07-13 PROCEDURE — U0004 COV-19 TEST NON-CDC HGH THRU: HCPCS

## 2020-07-14 LAB
REF LAB TEST METHOD: NORMAL
SARS-COV-2 RNA RESP QL NAA+PROBE: NOT DETECTED

## 2020-07-15 ENCOUNTER — ANESTHESIA EVENT (OUTPATIENT)
Dept: GASTROENTEROLOGY | Facility: HOSPITAL | Age: 40
End: 2020-07-15

## 2020-07-15 ENCOUNTER — ANESTHESIA (OUTPATIENT)
Dept: GASTROENTEROLOGY | Facility: HOSPITAL | Age: 40
End: 2020-07-15

## 2020-07-15 ENCOUNTER — HOSPITAL ENCOUNTER (OUTPATIENT)
Facility: HOSPITAL | Age: 40
Setting detail: HOSPITAL OUTPATIENT SURGERY
Discharge: HOME | End: 2020-07-15
Attending: COLON & RECTAL SURGERY | Admitting: COLON & RECTAL SURGERY

## 2020-07-15 VITALS
BODY MASS INDEX: 36.32 KG/M2 | SYSTOLIC BLOOD PRESSURE: 133 MMHG | HEIGHT: 66 IN | TEMPERATURE: 97.6 F | RESPIRATION RATE: 16 BRPM | WEIGHT: 226 LBS | OXYGEN SATURATION: 95 % | DIASTOLIC BLOOD PRESSURE: 82 MMHG | HEART RATE: 83 BPM

## 2020-07-15 DIAGNOSIS — C20 RECTAL CANCER (HCC): ICD-10-CM

## 2020-07-15 PROCEDURE — 45330 DIAGNOSTIC SIGMOIDOSCOPY: CPT | Performed by: COLON & RECTAL SURGERY

## 2020-07-15 PROCEDURE — 25010000002 PROPOFOL 10 MG/ML EMULSION: Performed by: ANESTHESIOLOGY

## 2020-07-15 RX ORDER — SODIUM CHLORIDE, SODIUM LACTATE, POTASSIUM CHLORIDE, CALCIUM CHLORIDE 600; 310; 30; 20 MG/100ML; MG/100ML; MG/100ML; MG/100ML
30 INJECTION, SOLUTION INTRAVENOUS CONTINUOUS
Status: DISCONTINUED | OUTPATIENT
Start: 2020-07-15 | End: 2020-07-15 | Stop reason: HOSPADM

## 2020-07-15 RX ORDER — PROPOFOL 10 MG/ML
VIAL (ML) INTRAVENOUS AS NEEDED
Status: DISCONTINUED | OUTPATIENT
Start: 2020-07-15 | End: 2020-07-15 | Stop reason: SURG

## 2020-07-15 RX ORDER — LIDOCAINE HYDROCHLORIDE 20 MG/ML
INJECTION, SOLUTION INFILTRATION; PERINEURAL AS NEEDED
Status: DISCONTINUED | OUTPATIENT
Start: 2020-07-15 | End: 2020-07-15 | Stop reason: SURG

## 2020-07-15 RX ORDER — SODIUM CHLORIDE 0.9 % (FLUSH) 0.9 %
10 SYRINGE (ML) INJECTION AS NEEDED
Status: DISCONTINUED | OUTPATIENT
Start: 2020-07-15 | End: 2020-07-15 | Stop reason: HOSPADM

## 2020-07-15 RX ORDER — SODIUM CHLORIDE, SODIUM LACTATE, POTASSIUM CHLORIDE, CALCIUM CHLORIDE 600; 310; 30; 20 MG/100ML; MG/100ML; MG/100ML; MG/100ML
1000 INJECTION, SOLUTION INTRAVENOUS CONTINUOUS
Status: DISCONTINUED | OUTPATIENT
Start: 2020-07-15 | End: 2020-07-15 | Stop reason: HOSPADM

## 2020-07-15 RX ORDER — PROPOFOL 10 MG/ML
VIAL (ML) INTRAVENOUS CONTINUOUS PRN
Status: DISCONTINUED | OUTPATIENT
Start: 2020-07-15 | End: 2020-07-15 | Stop reason: SURG

## 2020-07-15 RX ADMIN — PROPOFOL 140 MCG/KG/MIN: 10 INJECTION, EMULSION INTRAVENOUS at 11:03

## 2020-07-15 RX ADMIN — LIDOCAINE HYDROCHLORIDE 5 MG: 20 INJECTION, SOLUTION INFILTRATION; PERINEURAL at 11:01

## 2020-07-15 RX ADMIN — SODIUM CHLORIDE, POTASSIUM CHLORIDE, SODIUM LACTATE AND CALCIUM CHLORIDE 1000 ML: 600; 310; 30; 20 INJECTION, SOLUTION INTRAVENOUS at 10:39

## 2020-07-15 RX ADMIN — PROPOFOL 150 MG: 10 INJECTION, EMULSION INTRAVENOUS at 11:01

## 2020-07-15 NOTE — DISCHARGE INSTRUCTIONS
For the next 24 hours patient needs to be with a responsible adult.    For 24 hours DO NOT drive, operate machinery, appliances, drink alcohol, make important decisions or sign legal documents.    Start with a light or bland diet if you are feeling sick to your stomach otherwise advance to regular diet as tolerated.    Follow recommendations on procedure report if provided by your doctor.    Call Dr Ye for problems [phone]    Problems may include but not limited to: large amounts of bleeding, trouble breathing, repeated vomiting, severe unrelieved pain, fever or chills.

## 2020-07-15 NOTE — H&P
Carole Weaver is a 39 y.o. female  who is referred by Geronimo Ye MD for a colonoscopy. She   has an indications: previous colon cancer.     She denies any change in bowel function, melena, or hematochezia.    Past Medical History:   Diagnosis Date   • Abnormal Pap smear of cervix 02/02/1998    JULIUS I   • Anemia in pregnancy    • Anxiety    • Bilateral epiphora 04/2020   • Bilateral hand swelling 05/02/2020    SEEN AT [facility]   • Calculus of gallbladder 12/10/2019   • Cervical lymphadenitis 06/06/2012    SEEN AT [facility]   • Chemotherapy induced neutropenia (CMS/HCC) 04/2020   • Chemotherapy-induced nausea 02/2020   • Chemotherapy-induced thrombocytopenia 05/2020   • Chronic diarrhea    • Colon polyps     FOLLOWED BY DR. GERONIMO YE   • Cystitis 04/24/2020    WITH DEHYDRATION, ADMITTED TO [facility]   • Drug-induced peripheral neuropathy (CMS/HCC) 05/2020   • GERD (gastroesophageal reflux disease)    • Hand foot syndrome 05/2020   • Heart murmur     ?   • Hematochezia    • Hemorrhoids    • Heterozygous factor V Leiden mutation (CMS/[facility])     DX 8-2-2019   • History of chemotherapy 2019    FOLLOWED BY DR. ALEXANDRU HUNT   • History of gestational diabetes    • History of pre-eclampsia    • History of radiation therapy 2019    FOLLOWED BY DR. JAVON LEWIS   • History of TB skin testing 01/17/2009    TB Skin Test   • HPV in female 1998   • Hypokalemia 09/2019   • Hypomagnesemia 09/2019   • IBS (irritable bowel syndrome)    • Ileostomy in place (CMS/[facility])     FOLLOWED BY DR. GERONIMO YE   • Infected insect bite of neck 05/27/2016    SEEN AT [facility]   • Irregular heart beat 12/10/2019   • Irritable bowel syndrome    • Kidney stones 08/09/2007    SEEN AT [facility]   • Lumbar disc disease    • Lump of right breast    • Monopolar depression (CMS/HCC)    • On anticoagulant therapy    • PIH (pregnancy induced hypertension) 1998   • Pulmonary embolism without acute cor pulmonale (CMS/HCC) 09/20/2019    ADMITTED TO [facility]   • Pulmonary  nodule, right 2020   • Rectal cancer (CMS/HCC) 2019    INVASIVE MODERATELY DIFFERENTIATED ADENOCARCINOMA, CHEMO AND XRT FINISHED 2019   • Right ureteral stone 10/01/2019    SEEN AT [facility] ER   • SAB (spontaneous ) 1996     A2-1 INDUCED   • Sepsis due to cellulitis (CMS/HCC) 2002    LEFT GREAT TOE, ADMITTED TO [facility]   • Tachycardia 2020   • Urinary urgency 2020       Past Surgical History:   Procedure Laterality Date   • ABDOMINAL SURGERY     • CHOLECYSTECTOMY N/A 10/10/2003    LAPAROSCOPIC WITH CHOLANGIOGRAM, DR. JAMEY TALAVERA AT [facility]   • COLON RESECTION N/A 2019    Procedure: laparoscopic low anterior colon resection with mobilization of splenic flexure and diverting loop ileostomy: ERAS;  Surgeon: Geronimo Ye MD;  Location: [facility] OR;  Service: General   • COLONOSCOPY     • COLONOSCOPY W/ POLYPECTOMY N/A 2019    15 MM TUBULOVILLOUS ADENOMA POLYP IN HEPATIC FLEXURE, 20 MMTUBULOVILLOUS ADENOMA WITH HIGH GRADE DYSPLASIA IN RECTOSIGMOID, 4 CM MASS IN MID RECTUM, PATH: INVASIVE MODERATELY DIFFERENTIATED ADENOCARCINOMA, DR. JENNIFER LI AT [facility]   • DILATATION AND EVACUATION N/A    • ENDOSCOPY N/A 2019    LA GRADE A ESOPHAGITIS, GASTRITIS, ALL BIOPSIES BENIGN, DR. JENNIFER LI AT [facility]   • PAP SMEAR N/A 2014   • SIGMOIDOSCOPY N/A 2019    INFILTRATIVE PARTIALLY OBSTRUCTING LARGE RECTAL CANCER, AREA TATOOED, DR. GERONIMO YE AT [facility]   • SIGMOIDOSCOPY N/A 2019    AN ULCERATED PARTIALLY OBSTRUCTING MASS IN MID RECTUM, AREA TATTOOED, DR. GERONIMO YE AT [facility]   • TRANSRECTAL ULTRASOUND N/A 2019    Procedure: ULTRASOUND TRANSRECTAL;  Surgeon: Geronimo Ye MD;  Location: [facility] ENDOSCOPY;  Service: General   • URETEROSCOPY LASER LITHOTRIPSY WITH STENT INSERTION Right 10/30/2019    DR. ESTUARDO BEASLEY AT [place]   • VAGINAL DELIVERY N/A 1998   • VENOUS ACCESS DEVICE (PORT) INSERTION Left 2019     Procedure: INSERTION VENOUS ACCESS DEVICE;  Surgeon: Sj Joseph MD;  Location: [facility] OR [facility];  Service: General   • WISDOM TOOTH EXTRACTION  1993       Medications Prior to Admission   Medication Sig Dispense Refill Last Dose   • clonazePAM (KlonoPIN) 1 MG tablet TAKE 1 TABLET BY MOUTH 3 (THREE) TIMES A DAY AS NEEDED FOR ANXIETY OR SEIZURES. 90 tablet 0 7/14/2020 at Unknown time   • escitalopram (LEXAPRO) 20 MG tablet Take 1 tablet by mouth Daily. 90 tablet 3 7/14/2020 at Unknown time   • famotidine (PEPCID) 20 MG tablet TAKE 1 TABLET BY MOUTH TWICE A  tablet 1 7/14/2020 at Unknown time   • HYDROcodone-acetaminophen (NORCO) 7.5-325 MG per tablet Take 1 tablet by mouth Every 4 (Four) Hours As Needed for Moderate Pain . 180 tablet 0 7/14/2020 at Unknown time   • Loratadine (CLARITIN) 10 MG capsule Take  by mouth.   7/14/2020 at Unknown time   • ondansetron (ZOFRAN) 8 MG tablet Take 1 tablet by mouth 3 (Three) Times a Day As Needed for Nausea or Vomiting. 60 tablet 5 7/14/2020 at Unknown time   • prochlorperazine (COMPAZINE) 10 MG tablet Take 1 tablet by mouth Every 6 (Six) Hours As Needed for Nausea or Vomiting. 30 tablet 2 Past Week at Unknown time   • rivaroxaban (XARELTO) 20 MG tablet Take 1 tablet by mouth Daily. Start after finishing starter pack. 30 tablet 11 7/13/2020       No Known Allergies    Family History   Problem Relation Age of Onset   • Hyperlipidemia Mother    • Colon polyps Mother    • Heart disease Mother    • Hypertension Mother    • Factor V Leiden deficiency Mother    • Skin cancer Father         Squamous in 60s   • Heart disease Paternal Grandfather    • No Known Problems Son    • Cancer Paternal Uncle    • Colon cancer Paternal Uncle    • Diabetes Paternal Uncle    • Mental illness Sister         Addiction   • Colon cancer Maternal Uncle    • Malig Hyperthermia Neg Hx        Social History     Socioeconomic History   • Marital status:      Spouse name: Justus   • Lea  of children: 1   • Years of education: College   • Highest education level: Not on file   Occupational History   • Occupation:      Comment: [employer]     Employer: [employer]   Tobacco Use   • Smoking status: Heavy Tobacco Smoker     Packs/day: 0.50     Years: 24.00     Pack years: 12.00     Types: Electronic Cigarette, Cigarettes   • Smokeless tobacco: Never Used   Substance and Sexual Activity   • Alcohol use: Not Currently     Comment: Rarely   • Drug use: No   • Sexual activity: Defer       ROS    Vitals:    07/15/20 1025   BP: 124/73   Pulse: 82   Resp: 16   Temp: 97.6 °F (36.4 °C)   SpO2: 97%         Physical Exam   Constitutional: She is oriented to person, place, and time. She appears well-developed and well-nourished.   HENT:   Head: Normocephalic and atraumatic.   Eyes: Pupils are equal, round, and reactive to light. EOM are normal.   Cardiovascular: Regular rhythm.   Pulmonary/Chest: Effort normal.   Abdominal: Soft. She exhibits no distension.   Musculoskeletal: Normal range of motion.   Neurological: She is alert and oriented to person, place, and time.   Skin: Skin is warm and dry.   Psychiatric: Judgment and thought content normal.         Assessment/Plan      indications: previous rectal cancer         I recommend colonoscopy.  I described risks, benefits of the procedure with the patient including but not limited to bleeding, infection, possibility of perforation and possible polypectomy. All of the patient's questions were answered and they would like to proceed with the above recommendations.

## 2020-07-15 NOTE — ADDENDUM NOTE
Addendum  created 07/15/20 1158 by America Marcano MD    Review and Sign - Ready for Procedure, Review and Sign - Signed

## 2020-07-15 NOTE — ANESTHESIA POSTPROCEDURE EVALUATION
Patient: Carole Weaver    Procedure Summary     Date:  07/15/20 Room / Location:   [facility]    Anesthesia Start:  1058 Anesthesia Stop:  1121    Procedure:  COLONOSCOPY to cecum (N/A ) Diagnosis:       Rectal cancer (CMS/HCC)      (Rectal cancer (CMS/HCC) [C20])    Surgeon:  Padmaja Ye MD Provider:  America Marcano MD    Anesthesia Type:  MAC ASA Status:  3          Anesthesia Type: MAC    Vitals  Vitals Value Taken Time   /82 7/15/2020 11:46 AM   Temp     Pulse 83 7/15/2020 11:46 AM   Resp 16 7/15/2020 11:46 AM   SpO2 95 % 7/15/2020 11:31 AM           Post Anesthesia Care and Evaluation    Patient location during evaluation: PACU  Patient participation: complete - patient participated  Level of consciousness: awake and alert  Pain management: adequate  Airway patency: patent  Anesthetic complications: No anesthetic complications  PONV Status: none  Cardiovascular status: acceptable  Respiratory status: acceptable  Hydration status: acceptable

## 2020-07-15 NOTE — ANESTHESIA PREPROCEDURE EVALUATION
Anesthesia Evaluation     Patient summary reviewed and Nursing notes reviewed   NPO Solid Status: > 8 hours  NPO Liquid Status: > 8 hours           Airway   Dental      Pulmonary    (+) pulmonary embolism,   Cardiovascular     PT is on anticoagulation therapy    (+) valvular problems/murmurs murmur,   (-) hypertension      Neuro/Psych  (+) numbness, psychiatric history Anxiety and Depression,     GI/Hepatic/Renal/Endo    (+)  GERD, GI bleeding lower , renal disease stones,     Musculoskeletal     Abdominal    Substance History      OB/GYN    (-)  Pregnant and history of pregnancy induced hypertension        Other   blood dyscrasia (Factor V Leiden) thrombocytopenia,   history of cancer                    Anesthesia Plan    ASA 3     MAC     intravenous induction     Anesthetic plan, all risks, benefits, and alternatives have been provided, discussed and informed consent has been obtained with: patient.

## 2020-07-22 ENCOUNTER — OFFICE VISIT (OUTPATIENT)
Dept: SURGERY | Facility: CLINIC | Age: 40
End: 2020-07-22

## 2020-07-22 DIAGNOSIS — C20 RECTAL CANCER (HCC): Primary | ICD-10-CM

## 2020-07-22 PROCEDURE — 99442 PR PHYS/QHP TELEPHONE EVALUATION 11-20 MIN: CPT | Performed by: COLON & RECTAL SURGERY

## 2020-07-22 RX ORDER — ALVIMOPAN 12 MG/1
12 CAPSULE ORAL ONCE
Status: CANCELLED | OUTPATIENT
Start: 2020-08-03 | End: 2020-07-22

## 2020-07-22 RX ORDER — CELECOXIB 100 MG/1
200 CAPSULE ORAL EVERY 12 HOURS SCHEDULED
Status: CANCELLED | OUTPATIENT
Start: 2020-08-03

## 2020-07-22 RX ORDER — ACETAMINOPHEN 500 MG
1000 TABLET ORAL EVERY 6 HOURS
Status: CANCELLED | OUTPATIENT
Start: 2020-08-03

## 2020-07-22 RX ORDER — GABAPENTIN 100 MG/1
600 CAPSULE ORAL 3 TIMES DAILY
Status: CANCELLED | OUTPATIENT
Start: 2020-08-03

## 2020-07-22 RX ORDER — CHLORHEXIDINE GLUCONATE 4 G/100ML
SOLUTION TOPICAL 2 TIMES DAILY
Qty: 236 ML | Refills: 0 | Status: ON HOLD | OUTPATIENT
Start: 2020-07-22 | End: 2020-08-03

## 2020-07-23 DIAGNOSIS — C20 RECTAL CANCER (HCC): Primary | ICD-10-CM

## 2020-07-24 NOTE — PROGRESS NOTES
You have chosen to receive care through a telephone visit. Do you consent to use a telephone visit for your medical care today? Yes    Carole Weaver is a 39 y.o. female in for follow up of Rectal cancer (CMS/HCC)    Patient is doing well.  Occasional drainage from rectum  No drainage from vagina  Did okay after colonoscopy      PE:  Physical Exam - none       Assessment:   1. Rectal cancer (CMS/HCC)       Status post low anterior resection with diverting loop ileostomy with anastomotic disruption  Status post neoadjuvant and adjuvant chemo therapy plus radiation    Plan:  Discussed with patient Gastrografin enema shows posterior leak and colonoscopy shows same  We will have to redo the anastomosis and will not be able to takedown the ileostomy at that time  Went over open redo low anterior resection.  Discussed anticipated hospitalization and recovery.  I described with patient typical surgical time, postop recovery including pain management, and restrictions. I discussed with patient risks, benefits, and alternatives.  The patient had opportunity to ask questions.  I answered all questions.  Patient understands and wishes to proceed with procedure.  Discussed getting off of the blood thinner and will discuss with oncology if she needs a bridge.  Time with patient 20 minutes

## 2020-07-29 ENCOUNTER — DOCUMENTATION (OUTPATIENT)
Dept: SURGERY | Facility: CLINIC | Age: 40
End: 2020-07-29

## 2020-07-29 NOTE — PROGRESS NOTES
Pt called office today c/o abdominal cramping and pain below stoma. Pt said she took pain meds and this helped but eventually pain returned. Pt denies fever, last temp was 99.5 degrees. Output is good, without bleeding. No other symptoms or complaints. Told pt  was on vacation and would contact on call physicians. Also suggested using ice or heat alternating. Pt said she would continue using pain medication and try ice/ heat and call back if not better.

## 2020-07-31 ENCOUNTER — APPOINTMENT (OUTPATIENT)
Dept: PREADMISSION TESTING | Facility: HOSPITAL | Age: 40
End: 2020-07-31

## 2020-07-31 VITALS
WEIGHT: 227 LBS | HEART RATE: 95 BPM | HEIGHT: 66 IN | OXYGEN SATURATION: 99 % | SYSTOLIC BLOOD PRESSURE: 123 MMHG | BODY MASS INDEX: 36.48 KG/M2 | RESPIRATION RATE: 16 BRPM | DIASTOLIC BLOOD PRESSURE: 84 MMHG | TEMPERATURE: 97.8 F

## 2020-07-31 DIAGNOSIS — C20 RECTAL CANCER (HCC): ICD-10-CM

## 2020-07-31 LAB
ABO GROUP BLD: NORMAL
ANION GAP SERPL CALCULATED.3IONS-SCNC: 9.6 MMOL/L (ref 5–15)
BLD GP AB SCN SERPL QL: NEGATIVE
BUN SERPL-MCNC: 9 MG/DL (ref 6–20)
BUN/CREAT SERPL: 13.8 (ref 7–25)
CALCIUM SPEC-SCNC: 9.1 MG/DL (ref 8.6–10.5)
CHLORIDE SERPL-SCNC: 100 MMOL/L (ref 98–107)
CO2 SERPL-SCNC: 24.4 MMOL/L (ref 22–29)
CREAT SERPL-MCNC: 0.65 MG/DL (ref 0.57–1)
DEPRECATED RDW RBC AUTO: 42.9 FL (ref 37–54)
ERYTHROCYTE [DISTWIDTH] IN BLOOD BY AUTOMATED COUNT: 11.8 % (ref 12.3–15.4)
GFR SERPL CREATININE-BSD FRML MDRD: 101 ML/MIN/1.73
GLUCOSE SERPL-MCNC: 103 MG/DL (ref 65–99)
HCT VFR BLD AUTO: 42.5 % (ref 34–46.6)
HGB BLD-MCNC: 14.1 G/DL (ref 12–15.9)
MCH RBC QN AUTO: 32.6 PG (ref 26.6–33)
MCHC RBC AUTO-ENTMCNC: 33.2 G/DL (ref 31.5–35.7)
MCV RBC AUTO: 98.4 FL (ref 79–97)
PLATELET # BLD AUTO: 253 10*3/MM3 (ref 140–450)
PMV BLD AUTO: 8.6 FL (ref 6–12)
POTASSIUM SERPL-SCNC: 4 MMOL/L (ref 3.5–5.2)
RBC # BLD AUTO: 4.32 10*6/MM3 (ref 3.77–5.28)
RH BLD: POSITIVE
SODIUM SERPL-SCNC: 134 MMOL/L (ref 136–145)
T&S EXPIRATION DATE: NORMAL
WBC # BLD AUTO: 7.78 10*3/MM3 (ref 3.4–10.8)

## 2020-07-31 PROCEDURE — 85027 COMPLETE CBC AUTOMATED: CPT | Performed by: COLON & RECTAL SURGERY

## 2020-07-31 PROCEDURE — C9803 HOPD COVID-19 SPEC COLLECT: HCPCS

## 2020-07-31 PROCEDURE — 86900 BLOOD TYPING SEROLOGIC ABO: CPT | Performed by: COLON & RECTAL SURGERY

## 2020-07-31 PROCEDURE — 86901 BLOOD TYPING SEROLOGIC RH(D): CPT | Performed by: COLON & RECTAL SURGERY

## 2020-07-31 PROCEDURE — 86850 RBC ANTIBODY SCREEN: CPT | Performed by: COLON & RECTAL SURGERY

## 2020-07-31 PROCEDURE — 80048 BASIC METABOLIC PNL TOTAL CA: CPT | Performed by: COLON & RECTAL SURGERY

## 2020-07-31 PROCEDURE — 36415 COLL VENOUS BLD VENIPUNCTURE: CPT

## 2020-07-31 PROCEDURE — U0004 COV-19 TEST NON-CDC HGH THRU: HCPCS | Performed by: COLON & RECTAL SURGERY

## 2020-08-02 LAB
REF LAB TEST METHOD: NORMAL
SARS-COV-2 RNA RESP QL NAA+PROBE: NOT DETECTED

## 2020-08-03 ENCOUNTER — ANESTHESIA (OUTPATIENT)
Dept: PERIOP | Facility: HOSPITAL | Age: 40
End: 2020-08-03

## 2020-08-03 ENCOUNTER — HOSPITAL ENCOUNTER (INPATIENT)
Facility: HOSPITAL | Age: 40
LOS: 4 days | Discharge: HOME | End: 2020-08-07
Attending: COLON & RECTAL SURGERY | Admitting: COLON & RECTAL SURGERY

## 2020-08-03 ENCOUNTER — TELEPHONE (OUTPATIENT)
Dept: ONCOLOGY | Facility: CLINIC | Age: 40
End: 2020-08-03

## 2020-08-03 ENCOUNTER — ANESTHESIA EVENT (OUTPATIENT)
Dept: PERIOP | Facility: HOSPITAL | Age: 40
End: 2020-08-03

## 2020-08-03 DIAGNOSIS — C20 RECTAL CANCER (HCC): ICD-10-CM

## 2020-08-03 PROCEDURE — 25010000002 PROPOFOL 10 MG/ML EMULSION: Performed by: NURSE ANESTHETIST, CERTIFIED REGISTERED

## 2020-08-03 PROCEDURE — 81025 URINE PREGNANCY TEST: CPT | Performed by: ANESTHESIOLOGY

## 2020-08-03 PROCEDURE — 88304 TISSUE EXAM BY PATHOLOGIST: CPT | Performed by: COLON & RECTAL SURGERY

## 2020-08-03 PROCEDURE — 25010000002 MAGNESIUM SULFATE PER 500 MG OF MAGNESIUM: Performed by: NURSE ANESTHETIST, CERTIFIED REGISTERED

## 2020-08-03 PROCEDURE — 25010000003 BUPIVACAINE LIPOSOME 1.3 % SUSPENSION: Performed by: ANESTHESIOLOGY

## 2020-08-03 PROCEDURE — 25010000002 HYDROMORPHONE PER 4 MG: Performed by: COLON & RECTAL SURGERY

## 2020-08-03 PROCEDURE — 0DBP0ZZ EXCISION OF RECTUM, OPEN APPROACH: ICD-10-PCS | Performed by: COLON & RECTAL SURGERY

## 2020-08-03 PROCEDURE — 25010000002 MIDAZOLAM PER 1 MG: Performed by: ANESTHESIOLOGY

## 2020-08-03 PROCEDURE — C9290 INJ, BUPIVACAINE LIPOSOME: HCPCS | Performed by: ANESTHESIOLOGY

## 2020-08-03 PROCEDURE — 25010000002 HEPARIN (PORCINE) PER 1000 UNITS: Performed by: COLON & RECTAL SURGERY

## 2020-08-03 PROCEDURE — 25010000002 ERTAPENEM 1 GM/100ML SOLUTION: Performed by: COLON & RECTAL SURGERY

## 2020-08-03 PROCEDURE — 88305 TISSUE EXAM BY PATHOLOGIST: CPT | Performed by: COLON & RECTAL SURGERY

## 2020-08-03 PROCEDURE — 25010000002 LIDOCAINE PER 10 MG: Performed by: NURSE ANESTHETIST, CERTIFIED REGISTERED

## 2020-08-03 PROCEDURE — 25010000002 FENTANYL CITRATE (PF) 100 MCG/2ML SOLUTION: Performed by: NURSE ANESTHETIST, CERTIFIED REGISTERED

## 2020-08-03 PROCEDURE — 88331 PATH CONSLTJ SURG 1 BLK 1SPC: CPT | Performed by: COLON & RECTAL SURGERY

## 2020-08-03 PROCEDURE — 0DTL0ZZ RESECTION OF TRANSVERSE COLON, OPEN APPROACH: ICD-10-PCS | Performed by: COLON & RECTAL SURGERY

## 2020-08-03 PROCEDURE — 25010000002 FENTANYL CITRATE (PF) 100 MCG/2ML SOLUTION: Performed by: ANESTHESIOLOGY

## 2020-08-03 PROCEDURE — 25010000002 DEXAMETHASONE PER 1 MG: Performed by: NURSE ANESTHETIST, CERTIFIED REGISTERED

## 2020-08-03 PROCEDURE — C1765 ADHESION BARRIER: HCPCS | Performed by: COLON & RECTAL SURGERY

## 2020-08-03 PROCEDURE — 44145 PARTIAL REMOVAL OF COLON: CPT | Performed by: SPECIALIST/TECHNOLOGIST, OTHER

## 2020-08-03 PROCEDURE — 88307 TISSUE EXAM BY PATHOLOGIST: CPT | Performed by: COLON & RECTAL SURGERY

## 2020-08-03 PROCEDURE — 25010000002 HYDRALAZINE PER 20 MG: Performed by: NURSE ANESTHETIST, CERTIFIED REGISTERED

## 2020-08-03 PROCEDURE — 25010000002 ONDANSETRON PER 1 MG: Performed by: NURSE ANESTHETIST, CERTIFIED REGISTERED

## 2020-08-03 PROCEDURE — 44145 PARTIAL REMOVAL OF COLON: CPT | Performed by: COLON & RECTAL SURGERY

## 2020-08-03 PROCEDURE — 44139 MOBILIZATION OF COLON: CPT | Performed by: SPECIALIST/TECHNOLOGIST, OTHER

## 2020-08-03 PROCEDURE — 44139 MOBILIZATION OF COLON: CPT | Performed by: COLON & RECTAL SURGERY

## 2020-08-03 DEVICE — CONTOUR CURVED CUTTER STAPLER
Type: IMPLANTABLE DEVICE | Site: ABDOMEN | Status: FUNCTIONAL
Brand: CONTOUR

## 2020-08-03 DEVICE — SEALANT WND FIBRIN TISSEEL PREFIL/SYR/PRIMAFZ 10ML: Type: IMPLANTABLE DEVICE | Site: ABDOMEN | Status: FUNCTIONAL

## 2020-08-03 RX ORDER — NITROGLYCERIN 0.4 MG/1
0.4 TABLET SUBLINGUAL
Status: DISCONTINUED | OUTPATIENT
Start: 2020-08-03 | End: 2020-08-07 | Stop reason: HOSPADM

## 2020-08-03 RX ORDER — ONDANSETRON 2 MG/ML
INJECTION INTRAMUSCULAR; INTRAVENOUS AS NEEDED
Status: DISCONTINUED | OUTPATIENT
Start: 2020-08-03 | End: 2020-08-03 | Stop reason: SURG

## 2020-08-03 RX ORDER — LABETALOL HYDROCHLORIDE 5 MG/ML
INJECTION, SOLUTION INTRAVENOUS AS NEEDED
Status: DISCONTINUED | OUTPATIENT
Start: 2020-08-03 | End: 2020-08-03 | Stop reason: SURG

## 2020-08-03 RX ORDER — ALVIMOPAN 12 MG/1
12 CAPSULE ORAL ONCE
Status: COMPLETED | OUTPATIENT
Start: 2020-08-03 | End: 2020-08-03

## 2020-08-03 RX ORDER — METOPROLOL TARTRATE 5 MG/5ML
INJECTION INTRAVENOUS AS NEEDED
Status: DISCONTINUED | OUTPATIENT
Start: 2020-08-03 | End: 2020-08-03 | Stop reason: SURG

## 2020-08-03 RX ORDER — HEPARIN SODIUM 5000 [USP'U]/ML
5000 INJECTION, SOLUTION INTRAVENOUS; SUBCUTANEOUS EVERY 8 HOURS SCHEDULED
Status: DISCONTINUED | OUTPATIENT
Start: 2020-08-03 | End: 2020-08-05

## 2020-08-03 RX ORDER — ONDANSETRON 2 MG/ML
4 INJECTION INTRAMUSCULAR; INTRAVENOUS ONCE AS NEEDED
Status: DISCONTINUED | OUTPATIENT
Start: 2020-08-03 | End: 2020-08-03 | Stop reason: HOSPADM

## 2020-08-03 RX ORDER — GABAPENTIN 300 MG/1
600 CAPSULE ORAL EVERY 12 HOURS SCHEDULED
Status: COMPLETED | OUTPATIENT
Start: 2020-08-03 | End: 2020-08-07

## 2020-08-03 RX ORDER — LORAZEPAM 0.5 MG/1
0.5 TABLET ORAL EVERY 8 HOURS PRN
Status: DISCONTINUED | OUTPATIENT
Start: 2020-08-03 | End: 2020-08-07 | Stop reason: HOSPADM

## 2020-08-03 RX ORDER — LIDOCAINE HYDROCHLORIDE ANHYDROUS AND DEXTROSE MONOHYDRATE 5; 400 G/100ML; MG/100ML
INJECTION, SOLUTION INTRAVENOUS CONTINUOUS PRN
Status: DISCONTINUED | OUTPATIENT
Start: 2020-08-03 | End: 2020-08-03 | Stop reason: SURG

## 2020-08-03 RX ORDER — HYDROMORPHONE HYDROCHLORIDE 1 MG/ML
0.5 INJECTION, SOLUTION INTRAMUSCULAR; INTRAVENOUS; SUBCUTANEOUS
Status: DISCONTINUED | OUTPATIENT
Start: 2020-08-03 | End: 2020-08-03 | Stop reason: HOSPADM

## 2020-08-03 RX ORDER — ACETAMINOPHEN 325 MG/1
650 TABLET ORAL ONCE AS NEEDED
Status: DISCONTINUED | OUTPATIENT
Start: 2020-08-03 | End: 2020-08-03 | Stop reason: HOSPADM

## 2020-08-03 RX ORDER — BUPIVACAINE HYDROCHLORIDE AND EPINEPHRINE 2.5; 5 MG/ML; UG/ML
INJECTION, SOLUTION INFILTRATION; PERINEURAL AS NEEDED
Status: DISCONTINUED | OUTPATIENT
Start: 2020-08-03 | End: 2020-08-03 | Stop reason: HOSPADM

## 2020-08-03 RX ORDER — ACETAMINOPHEN 500 MG
1000 TABLET ORAL EVERY 6 HOURS
Status: DISCONTINUED | OUTPATIENT
Start: 2020-08-03 | End: 2020-08-03 | Stop reason: HOSPADM

## 2020-08-03 RX ORDER — DIPHENHYDRAMINE HYDROCHLORIDE 50 MG/ML
12.5 INJECTION INTRAMUSCULAR; INTRAVENOUS
Status: DISCONTINUED | OUTPATIENT
Start: 2020-08-03 | End: 2020-08-03 | Stop reason: HOSPADM

## 2020-08-03 RX ORDER — MAGNESIUM HYDROXIDE 1200 MG/15ML
LIQUID ORAL AS NEEDED
Status: DISCONTINUED | OUTPATIENT
Start: 2020-08-03 | End: 2020-08-03 | Stop reason: HOSPADM

## 2020-08-03 RX ORDER — SODIUM CHLORIDE, SODIUM LACTATE, POTASSIUM CHLORIDE, CALCIUM CHLORIDE 600; 310; 30; 20 MG/100ML; MG/100ML; MG/100ML; MG/100ML
INJECTION, SOLUTION INTRAVENOUS CONTINUOUS PRN
Status: DISCONTINUED | OUTPATIENT
Start: 2020-08-03 | End: 2020-08-03 | Stop reason: SURG

## 2020-08-03 RX ORDER — EPHEDRINE SULFATE 50 MG/ML
5 INJECTION, SOLUTION INTRAVENOUS ONCE AS NEEDED
Status: DISCONTINUED | OUTPATIENT
Start: 2020-08-03 | End: 2020-08-03 | Stop reason: HOSPADM

## 2020-08-03 RX ORDER — ESCITALOPRAM OXALATE 20 MG/1
20 TABLET ORAL EVERY EVENING
Status: DISCONTINUED | OUTPATIENT
Start: 2020-08-03 | End: 2020-08-07 | Stop reason: HOSPADM

## 2020-08-03 RX ORDER — MIDAZOLAM HYDROCHLORIDE 1 MG/ML
1 INJECTION INTRAMUSCULAR; INTRAVENOUS
Status: DISCONTINUED | OUTPATIENT
Start: 2020-08-03 | End: 2020-08-03 | Stop reason: HOSPADM

## 2020-08-03 RX ORDER — DIPHENHYDRAMINE HCL 25 MG
25 CAPSULE ORAL
Status: DISCONTINUED | OUTPATIENT
Start: 2020-08-03 | End: 2020-08-03 | Stop reason: HOSPADM

## 2020-08-03 RX ORDER — KETAMINE HYDROCHLORIDE 50 MG/ML
INJECTION, SOLUTION, CONCENTRATE INTRAMUSCULAR; INTRAVENOUS AS NEEDED
Status: DISCONTINUED | OUTPATIENT
Start: 2020-08-03 | End: 2020-08-03 | Stop reason: SURG

## 2020-08-03 RX ORDER — NALOXONE HCL 0.4 MG/ML
0.4 VIAL (ML) INJECTION
Status: DISCONTINUED | OUTPATIENT
Start: 2020-08-03 | End: 2020-08-07 | Stop reason: HOSPADM

## 2020-08-03 RX ORDER — BUPIVACAINE HYDROCHLORIDE 2.5 MG/ML
INJECTION, SOLUTION EPIDURAL; INFILTRATION; INTRACAUDAL
Status: COMPLETED | OUTPATIENT
Start: 2020-08-03 | End: 2020-08-03

## 2020-08-03 RX ORDER — NALOXONE HCL 0.4 MG/ML
0.2 VIAL (ML) INJECTION AS NEEDED
Status: DISCONTINUED | OUTPATIENT
Start: 2020-08-03 | End: 2020-08-03 | Stop reason: HOSPADM

## 2020-08-03 RX ORDER — ROCURONIUM BROMIDE 10 MG/ML
INJECTION, SOLUTION INTRAVENOUS AS NEEDED
Status: DISCONTINUED | OUTPATIENT
Start: 2020-08-03 | End: 2020-08-03 | Stop reason: SURG

## 2020-08-03 RX ORDER — LIDOCAINE HYDROCHLORIDE 20 MG/ML
INJECTION, SOLUTION INFILTRATION; PERINEURAL AS NEEDED
Status: DISCONTINUED | OUTPATIENT
Start: 2020-08-03 | End: 2020-08-03 | Stop reason: SURG

## 2020-08-03 RX ORDER — FLUMAZENIL 0.1 MG/ML
0.2 INJECTION INTRAVENOUS AS NEEDED
Status: DISCONTINUED | OUTPATIENT
Start: 2020-08-03 | End: 2020-08-03 | Stop reason: HOSPADM

## 2020-08-03 RX ORDER — ACETAMINOPHEN 500 MG
1000 TABLET ORAL EVERY 6 HOURS
Status: DISCONTINUED | OUTPATIENT
Start: 2020-08-03 | End: 2020-08-07 | Stop reason: HOSPADM

## 2020-08-03 RX ORDER — HYDRALAZINE HYDROCHLORIDE 20 MG/ML
5 INJECTION INTRAMUSCULAR; INTRAVENOUS
Status: DISCONTINUED | OUTPATIENT
Start: 2020-08-03 | End: 2020-08-03 | Stop reason: HOSPADM

## 2020-08-03 RX ORDER — LIDOCAINE HYDROCHLORIDE 40 MG/ML
SOLUTION TOPICAL AS NEEDED
Status: DISCONTINUED | OUTPATIENT
Start: 2020-08-03 | End: 2020-08-03 | Stop reason: SURG

## 2020-08-03 RX ORDER — GABAPENTIN 100 MG/1
600 CAPSULE ORAL 3 TIMES DAILY
Status: DISCONTINUED | OUTPATIENT
Start: 2020-08-03 | End: 2020-08-03

## 2020-08-03 RX ORDER — ONDANSETRON 2 MG/ML
4 INJECTION INTRAMUSCULAR; INTRAVENOUS EVERY 6 HOURS PRN
Status: DISCONTINUED | OUTPATIENT
Start: 2020-08-03 | End: 2020-08-07 | Stop reason: HOSPADM

## 2020-08-03 RX ORDER — CELECOXIB 100 MG/1
200 CAPSULE ORAL EVERY 12 HOURS SCHEDULED
Status: DISCONTINUED | OUTPATIENT
Start: 2020-08-03 | End: 2020-08-03

## 2020-08-03 RX ORDER — CETIRIZINE HYDROCHLORIDE 10 MG/1
10 TABLET ORAL DAILY
Status: DISCONTINUED | OUTPATIENT
Start: 2020-08-03 | End: 2020-08-07 | Stop reason: HOSPADM

## 2020-08-03 RX ORDER — ACETAMINOPHEN 10 MG/ML
1000 INJECTION, SOLUTION INTRAVENOUS ONCE
Status: COMPLETED | OUTPATIENT
Start: 2020-08-03 | End: 2020-08-03

## 2020-08-03 RX ORDER — GABAPENTIN 300 MG/1
600 CAPSULE ORAL ONCE
Status: COMPLETED | OUTPATIENT
Start: 2020-08-03 | End: 2020-08-03

## 2020-08-03 RX ORDER — OXYCODONE AND ACETAMINOPHEN 7.5; 325 MG/1; MG/1
1 TABLET ORAL ONCE AS NEEDED
Status: DISCONTINUED | OUTPATIENT
Start: 2020-08-03 | End: 2020-08-03 | Stop reason: HOSPADM

## 2020-08-03 RX ORDER — HYDROCODONE BITARTRATE AND ACETAMINOPHEN 7.5; 325 MG/1; MG/1
1 TABLET ORAL ONCE AS NEEDED
Status: DISCONTINUED | OUTPATIENT
Start: 2020-08-03 | End: 2020-08-03 | Stop reason: HOSPADM

## 2020-08-03 RX ORDER — LIDOCAINE HYDROCHLORIDE 10 MG/ML
0.5 INJECTION, SOLUTION EPIDURAL; INFILTRATION; INTRACAUDAL; PERINEURAL ONCE AS NEEDED
Status: DISCONTINUED | OUTPATIENT
Start: 2020-08-03 | End: 2020-08-03 | Stop reason: HOSPADM

## 2020-08-03 RX ORDER — HYDRALAZINE HYDROCHLORIDE 20 MG/ML
INJECTION INTRAMUSCULAR; INTRAVENOUS AS NEEDED
Status: DISCONTINUED | OUTPATIENT
Start: 2020-08-03 | End: 2020-08-03 | Stop reason: SURG

## 2020-08-03 RX ORDER — SODIUM CHLORIDE, SODIUM LACTATE, POTASSIUM CHLORIDE, CALCIUM CHLORIDE 600; 310; 30; 20 MG/100ML; MG/100ML; MG/100ML; MG/100ML
9 INJECTION, SOLUTION INTRAVENOUS CONTINUOUS
Status: DISCONTINUED | OUTPATIENT
Start: 2020-08-03 | End: 2020-08-03

## 2020-08-03 RX ORDER — HYDROMORPHONE HYDROCHLORIDE 1 MG/ML
0.25 INJECTION, SOLUTION INTRAMUSCULAR; INTRAVENOUS; SUBCUTANEOUS
Status: DISCONTINUED | OUTPATIENT
Start: 2020-08-03 | End: 2020-08-07 | Stop reason: HOSPADM

## 2020-08-03 RX ORDER — PROPOFOL 10 MG/ML
VIAL (ML) INTRAVENOUS AS NEEDED
Status: DISCONTINUED | OUTPATIENT
Start: 2020-08-03 | End: 2020-08-03 | Stop reason: SURG

## 2020-08-03 RX ORDER — FAMOTIDINE 10 MG/ML
20 INJECTION, SOLUTION INTRAVENOUS ONCE
Status: COMPLETED | OUTPATIENT
Start: 2020-08-03 | End: 2020-08-03

## 2020-08-03 RX ORDER — FENTANYL CITRATE 50 UG/ML
50 INJECTION, SOLUTION INTRAMUSCULAR; INTRAVENOUS
Status: DISCONTINUED | OUTPATIENT
Start: 2020-08-03 | End: 2020-08-03 | Stop reason: HOSPADM

## 2020-08-03 RX ORDER — ALVIMOPAN 12 MG/1
12 CAPSULE ORAL 2 TIMES DAILY
Status: DISCONTINUED | OUTPATIENT
Start: 2020-08-04 | End: 2020-08-07

## 2020-08-03 RX ORDER — SODIUM CHLORIDE 0.9 % (FLUSH) 0.9 %
3 SYRINGE (ML) INJECTION EVERY 12 HOURS SCHEDULED
Status: DISCONTINUED | OUTPATIENT
Start: 2020-08-03 | End: 2020-08-03 | Stop reason: HOSPADM

## 2020-08-03 RX ORDER — CELECOXIB 200 MG/1
200 CAPSULE ORAL ONCE
Status: COMPLETED | OUTPATIENT
Start: 2020-08-03 | End: 2020-08-03

## 2020-08-03 RX ORDER — SODIUM CHLORIDE 0.9 % (FLUSH) 0.9 %
3-10 SYRINGE (ML) INJECTION AS NEEDED
Status: DISCONTINUED | OUTPATIENT
Start: 2020-08-03 | End: 2020-08-03 | Stop reason: HOSPADM

## 2020-08-03 RX ORDER — DEXAMETHASONE SODIUM PHOSPHATE 10 MG/ML
INJECTION INTRAMUSCULAR; INTRAVENOUS AS NEEDED
Status: DISCONTINUED | OUTPATIENT
Start: 2020-08-03 | End: 2020-08-03 | Stop reason: SURG

## 2020-08-03 RX ORDER — CELECOXIB 200 MG/1
200 CAPSULE ORAL EVERY 12 HOURS SCHEDULED
Status: COMPLETED | OUTPATIENT
Start: 2020-08-03 | End: 2020-08-07

## 2020-08-03 RX ORDER — SODIUM CHLORIDE, SODIUM LACTATE, POTASSIUM CHLORIDE, CALCIUM CHLORIDE 600; 310; 30; 20 MG/100ML; MG/100ML; MG/100ML; MG/100ML
50 INJECTION, SOLUTION INTRAVENOUS CONTINUOUS
Status: DISCONTINUED | OUTPATIENT
Start: 2020-08-03 | End: 2020-08-05

## 2020-08-03 RX ORDER — MAGNESIUM SULFATE HEPTAHYDRATE 500 MG/ML
INJECTION, SOLUTION INTRAMUSCULAR; INTRAVENOUS AS NEEDED
Status: DISCONTINUED | OUTPATIENT
Start: 2020-08-03 | End: 2020-08-03 | Stop reason: SURG

## 2020-08-03 RX ORDER — SCOLOPAMINE TRANSDERMAL SYSTEM 1 MG/1
1 PATCH, EXTENDED RELEASE TRANSDERMAL
Status: DISCONTINUED | OUTPATIENT
Start: 2020-08-03 | End: 2020-08-07 | Stop reason: HOSPADM

## 2020-08-03 RX ADMIN — BUPIVACAINE HYDROCHLORIDE 20 ML: 2.5 INJECTION, SOLUTION EPIDURAL; INFILTRATION; INTRACAUDAL; PERINEURAL at 07:36

## 2020-08-03 RX ADMIN — LIDOCAINE HYDROCHLORIDE ANHYDROUS AND DEXTROSE MONOHYDRATE 2 MG/MIN: .4; 5 INJECTION, SOLUTION INTRAVENOUS at 07:40

## 2020-08-03 RX ADMIN — ALVIMOPAN 12 MG: 12 CAPSULE ORAL at 06:47

## 2020-08-03 RX ADMIN — HYDROMORPHONE HYDROCHLORIDE 0.25 MG: 1 INJECTION, SOLUTION INTRAMUSCULAR; INTRAVENOUS; SUBCUTANEOUS at 21:34

## 2020-08-03 RX ADMIN — FENTANYL CITRATE 100 MCG: 50 INJECTION INTRAMUSCULAR; INTRAVENOUS at 07:55

## 2020-08-03 RX ADMIN — ROCURONIUM BROMIDE 10 MG: 10 INJECTION INTRAVENOUS at 11:27

## 2020-08-03 RX ADMIN — KETAMINE HYDROCHLORIDE 10 MG: 50 INJECTION, SOLUTION INTRAMUSCULAR; INTRAVENOUS at 11:47

## 2020-08-03 RX ADMIN — KETAMINE HYDROCHLORIDE 10 MG: 50 INJECTION, SOLUTION INTRAMUSCULAR; INTRAVENOUS at 10:35

## 2020-08-03 RX ADMIN — SCOPALAMINE 1 PATCH: 1 PATCH, EXTENDED RELEASE TRANSDERMAL at 06:52

## 2020-08-03 RX ADMIN — FENTANYL CITRATE 50 MCG: 50 INJECTION, SOLUTION INTRAMUSCULAR; INTRAVENOUS at 13:07

## 2020-08-03 RX ADMIN — SODIUM CHLORIDE, POTASSIUM CHLORIDE, SODIUM LACTATE AND CALCIUM CHLORIDE: 600; 310; 30; 20 INJECTION, SOLUTION INTRAVENOUS at 07:00

## 2020-08-03 RX ADMIN — LABETALOL HYDROCHLORIDE 10 MG: 5 INJECTION, SOLUTION INTRAVENOUS at 09:11

## 2020-08-03 RX ADMIN — FAMOTIDINE 20 MG: 10 INJECTION INTRAVENOUS at 06:54

## 2020-08-03 RX ADMIN — ROCURONIUM BROMIDE 20 MG: 10 INJECTION INTRAVENOUS at 10:12

## 2020-08-03 RX ADMIN — HEPARIN SODIUM 5000 UNITS: 5000 INJECTION INTRAVENOUS; SUBCUTANEOUS at 20:16

## 2020-08-03 RX ADMIN — ROCURONIUM BROMIDE 50 MG: 10 INJECTION INTRAVENOUS at 07:33

## 2020-08-03 RX ADMIN — KETAMINE HYDROCHLORIDE 10 MG: 50 INJECTION, SOLUTION INTRAMUSCULAR; INTRAVENOUS at 09:35

## 2020-08-03 RX ADMIN — METOPROLOL TARTRATE 2.5 MG: 1 INJECTION, SOLUTION INTRAVENOUS at 08:28

## 2020-08-03 RX ADMIN — LIDOCAINE HYDROCHLORIDE 100 MG: 20 INJECTION, SOLUTION INFILTRATION; PERINEURAL at 07:33

## 2020-08-03 RX ADMIN — HYDROMORPHONE HYDROCHLORIDE 0.25 MG: 1 INJECTION, SOLUTION INTRAMUSCULAR; INTRAVENOUS; SUBCUTANEOUS at 18:29

## 2020-08-03 RX ADMIN — ACETAMINOPHEN 1000 MG: 500 TABLET, FILM COATED ORAL at 17:44

## 2020-08-03 RX ADMIN — CELECOXIB 200 MG: 200 CAPSULE ORAL at 06:54

## 2020-08-03 RX ADMIN — GABAPENTIN 600 MG: 300 CAPSULE ORAL at 20:09

## 2020-08-03 RX ADMIN — BUPIVACAINE 20 ML: 13.3 INJECTION, SUSPENSION, LIPOSOMAL INFILTRATION at 07:36

## 2020-08-03 RX ADMIN — METOPROLOL TARTRATE 2.5 MG: 1 INJECTION, SOLUTION INTRAVENOUS at 08:33

## 2020-08-03 RX ADMIN — SODIUM CHLORIDE, POTASSIUM CHLORIDE, SODIUM LACTATE AND CALCIUM CHLORIDE 50 ML/HR: 600; 310; 30; 20 INJECTION, SOLUTION INTRAVENOUS at 14:34

## 2020-08-03 RX ADMIN — LIDOCAINE HYDROCHLORIDE 1 EACH: 40 SOLUTION TOPICAL at 07:35

## 2020-08-03 RX ADMIN — PROPOFOL 200 MG: 10 INJECTION, EMULSION INTRAVENOUS at 07:33

## 2020-08-03 RX ADMIN — ROCURONIUM BROMIDE 10 MG: 10 INJECTION INTRAVENOUS at 09:28

## 2020-08-03 RX ADMIN — ERTAPENEM SODIUM 1 G: 1 INJECTION, POWDER, LYOPHILIZED, FOR SOLUTION INTRAMUSCULAR; INTRAVENOUS at 07:38

## 2020-08-03 RX ADMIN — ONDANSETRON HYDROCHLORIDE 4 MG: 2 SOLUTION INTRAMUSCULAR; INTRAVENOUS at 11:43

## 2020-08-03 RX ADMIN — KETAMINE HYDROCHLORIDE 50 MG: 50 INJECTION, SOLUTION INTRAMUSCULAR; INTRAVENOUS at 07:37

## 2020-08-03 RX ADMIN — ACETAMINOPHEN 1000 MG: 500 TABLET, FILM COATED ORAL at 06:48

## 2020-08-03 RX ADMIN — SODIUM CHLORIDE, POTASSIUM CHLORIDE, SODIUM LACTATE AND CALCIUM CHLORIDE 50 ML/HR: 600; 310; 30; 20 INJECTION, SOLUTION INTRAVENOUS at 18:29

## 2020-08-03 RX ADMIN — HYDRALAZINE HYDROCHLORIDE 10 MG: 20 INJECTION INTRAMUSCULAR; INTRAVENOUS at 08:39

## 2020-08-03 RX ADMIN — CETIRIZINE HYDROCHLORIDE 10 MG: 10 TABLET, FILM COATED ORAL at 17:43

## 2020-08-03 RX ADMIN — CELECOXIB 200 MG: 100 CAPSULE ORAL at 20:09

## 2020-08-03 RX ADMIN — LABETALOL HYDROCHLORIDE 5 MG: 5 INJECTION, SOLUTION INTRAVENOUS at 11:41

## 2020-08-03 RX ADMIN — MAGNESIUM SULFATE HEPTAHYDRATE 2 G: 500 INJECTION, SOLUTION INTRAMUSCULAR; INTRAVENOUS at 07:37

## 2020-08-03 RX ADMIN — KETAMINE HYDROCHLORIDE 10 MG: 50 INJECTION, SOLUTION INTRAMUSCULAR; INTRAVENOUS at 11:35

## 2020-08-03 RX ADMIN — MIDAZOLAM 1 MG: 1 INJECTION INTRAMUSCULAR; INTRAVENOUS at 06:59

## 2020-08-03 RX ADMIN — ESCITALOPRAM 20 MG: 20 TABLET, FILM COATED ORAL at 17:44

## 2020-08-03 RX ADMIN — KETAMINE HYDROCHLORIDE 10 MG: 50 INJECTION, SOLUTION INTRAMUSCULAR; INTRAVENOUS at 08:34

## 2020-08-03 RX ADMIN — DEXAMETHASONE SODIUM PHOSPHATE 8 MG: 10 INJECTION INTRAMUSCULAR; INTRAVENOUS at 07:42

## 2020-08-03 RX ADMIN — HYDRALAZINE HYDROCHLORIDE 10 MG: 20 INJECTION INTRAMUSCULAR; INTRAVENOUS at 08:46

## 2020-08-03 RX ADMIN — SODIUM CHLORIDE, POTASSIUM CHLORIDE, SODIUM LACTATE AND CALCIUM CHLORIDE: 600; 310; 30; 20 INJECTION, SOLUTION INTRAVENOUS at 10:52

## 2020-08-03 RX ADMIN — SODIUM CHLORIDE, POTASSIUM CHLORIDE, SODIUM LACTATE AND CALCIUM CHLORIDE 9 ML/HR: 600; 310; 30; 20 INJECTION, SOLUTION INTRAVENOUS at 06:46

## 2020-08-03 RX ADMIN — SUGAMMADEX 200 MG: 100 INJECTION, SOLUTION INTRAVENOUS at 11:48

## 2020-08-03 RX ADMIN — ACETAMINOPHEN 1000 MG: 10 INJECTION, SOLUTION INTRAVENOUS at 12:41

## 2020-08-03 RX ADMIN — LABETALOL HYDROCHLORIDE 5 MG: 5 INJECTION, SOLUTION INTRAVENOUS at 10:35

## 2020-08-03 RX ADMIN — GABAPENTIN 600 MG: 300 CAPSULE ORAL at 06:48

## 2020-08-03 NOTE — ANESTHESIA POSTPROCEDURE EVALUATION
Patient: Carole Weaver    Procedure Summary     Date:  08/03/20 Room / Location:  [facility] OR  / [facility]IN OR    Anesthesia Start:  0728 Anesthesia Stop:  1215    Procedure:  LOW ANTERIOR COLON RESECTION, COLOANAL ANASTOMOSIS, MOBILIZATION SPLENIC FLEXURE (N/A Abdomen) Diagnosis:       Rectal cancer (CMS/HCC)      (Rectal cancer (CMS/HCC) [C20])    Surgeon:  Padmaja Ye MD Provider:  Ravi Bryan MD    Anesthesia Type:  general with block ASA Status:  3          Anesthesia Type: general with block    Vitals  Vitals Value Taken Time   /109 8/3/2020 12:30 PM   Temp     Pulse 99 8/3/2020 12:36 PM   Resp     SpO2 98 % 8/3/2020 12:36 PM   Vitals shown include unvalidated device data.        Post Anesthesia Care and Evaluation    Patient location during evaluation: PACU  Patient participation: complete - patient participated  Level of consciousness: awake and alert  Pain management: adequate  Airway patency: patent  Anesthetic complications: No anesthetic complications    Cardiovascular status: acceptable  Respiratory status: acceptable  Hydration status: acceptable    Comments: --------------------            08/03/20               0701     --------------------   BP:                  Pulse:      89       Resp:       16       Temp:                SpO2:      95%      --------------------

## 2020-08-03 NOTE — OP NOTE
08/03/20    Surgeon: Padmaja Ye MD    Surgical  Assistant: Valerie Mojica CSA     Preoperative diagnosis: Rectal cancer (CMS/HCC) [C20]    Post-Op Diagnosis Codes:     * Rectal cancer (CMS/HCC) [C20]    Procedure: LOW ANTERIOR COLON RESECTION, COLOANAL ANASTOMOSIS, MOBILIZATION SPLENIC FLEXURE, * Panel 2 does not exist *    Estimated Blood Loss: 400 mL    Specimens:   Specimens     ID Source Type Tests Collected By Collected At Frozen?      A Large Intestine, Rectum Tissue · TISSUE PATHOLOGY EXAM   Padmaja Ye MD 8/3/20 0859      Description: Rectal Anastamosis    B Omentum Tissue · TISSUE PATHOLOGY EXAM   Padmaja Ye MD 8/3/20 1035 Yes     Description: omental nodule- history of rectal cancer    C Omentum Tissue · TISSUE PATHOLOGY EXAM   Padmaja Ye MD 8/3/20 1038 Yes     Description: omental nodule 2- history of rectal cancer         Order Name Source Comment Collection Info Order Time   TISSUE PATHOLOGY EXAM Omentum  Collected By: Padmaja Ye MD 8/3/2020 10:36 AM       Indication:  Carole Weaver is a 40 y.o. female who comes in with Rectal cancer (CMS/HCC) s/p neoadjuvant and adjuvant chemo and xrt.  She developed a posterior leak that never healed.  Patient understands risks, benefits,and alternatives wishes to proceed.      Procedure Details:  The patient was brought to the operating room with SCDs in place and antibiotics infused. After general anesthesia was achieved, the patient was placed in [place] lithotomy. A TAP block was done by anesthesia. She was then prepped and draped in sterile fashion. The patient had a low midline incision previously. I extended the incision up above the umbilicus and got into the abdomen in a virgin area and was able to sweep the small bowel out of the way and then open it all the way down to the pubis. I put in a Bookwalter to retract the abdominal wall. The patient had some adhesions of the small bowel to the ostomy. This was taken down sharply. She  had some adhesions to the pelvic sidewall and to the uterus. These were all taken down sharply. I was able to pack the small bowel into the abdomen, found the colon and carried it down to where the anastomosis was disrupted. I was able to get around it, but because of the large disruption, the tissue planes were tough to find. I got into a phlegmon posteriorly and found staples but never found a clear plane to the remaining rectum, so at this point I went below and put in a Lone Star retractor and could easily see the end where the anastomosis was. I went about 2 cm above the dentate line, which was about 2 cm below the end of the rectum, and I was able to go in posteriorly and do a full thickness resection and was able to get around then anteriorly off the vagina and off laterally. The specimen came out in pieces and since it looked like I could do the coloanal pretty well, I then rescrubbed and went above, after I took out the [place], and started mobilizing the left colon all the way around the splenic flexure. At the splenic flexure there was an inflammatory mass of omentum stuck to the splenic flexure. I could not tell if there was maybe an omental met, so I excised that and sent it off as specimen. There was another one in the omentum on the right side, and I excised that and sent it off for a frozen also given her history of rectal cancer. I was able to get around the splenic flexure, got the omentum off the distal transverse colon, and there was going to be good enough length. I stapled off the end above where the anastomosis was and all that tissue went off as specimen. I then placed a suture at the end of the staple line, brought a ring forceps up and brought the ring forceps through the rectal opening and took the sutures and then brought the conduit down. I then went below, put the Lone Star back in, and took off the anterior part of the staple line and started doing coloanal anastomosis with 2-0  Vicryl in interrupted fashion. I took the rest of the staple line off and sent it off as specimen. I then used the Faheem to make sure that there were no gaps in the anastomosis. I injected 20 mL of Marcaine as a perineum block and anal block to help with pain control afterwards. Once I was done, I rescrubbed again, went above and put a drain in the left lower quadrant, coming out the left lower quadrant and put it into the pelvis and then sutured it to the skin. I then put 4 sheets of Seprafilm along the abdominal wall and around the ileostomy to aid with adhesions and then closed the midline incision with 1 from proximal and 1 distal and tied them together. Docked the knot, irrigated out the subcutaneous tissue, and closed the skin with skin clips. All instrument, lap counts and needle counts were correct. The patient was stable throughout the entire case and was taken to recovery.

## 2020-08-03 NOTE — ANESTHESIA PROCEDURE NOTES
Peripheral Block    Pre-sedation assessment completed: 8/3/2020 7:36 AM    Patient reassessed immediately prior to procedure    Patient location during procedure: OR  Start time: 8/3/2020 7:36 AM  Stop time: 8/3/2020 7:44 AM  Reason for block: at surgeon's request and post-op pain management  Performed by  Anesthesiologist: Ravi Bryan MD  Preanesthetic Checklist  Completed: patient identified, site marked, surgical consent, pre-op evaluation, timeout performed, IV checked, risks and benefits discussed and monitors and equipment checked  Prep:  Sterile barriers:cap, gloves, gown, mask and sterile barriers  Prep: ChloraPrep  Patient monitoring: blood pressure monitoring, continuous pulse oximetry and EKG  Procedure  Sedation:yes    Guidance:ultrasound guided  ULTRASOUND INTERPRETATION. Using ultrasound guidance a 21 G gauge needle was placed in close proximity to the nerve, at which point, under ultrasound guidance anesthetic was injected in the area of the nerve and spread of the anesthesia was seen on ultrasound in close proximity thereto.  There were no abnormalities seen on ultrasound; a digital image was taken; and the patient tolerated the procedure with no complications. Images:still images obtained    Laterality:Bilateral  Block Type:TAP  Injection Technique:single-shot  Needle Type:echogenic  Needle Gauge:21 G      Medications Used: bupivacaine liposome (EXPAREL) 1.3 % injection, 20 mL  bupivacaine PF (MARCAINE) 0.25 % injection, 20 mL      Post Assessment  Injection Assessment: negative aspiration for heme, no paresthesia on injection and incremental injection  Patient Tolerance:comfortable throughout block  Complications:no

## 2020-08-03 NOTE — PLAN OF CARE
Problem: Patient Care Overview  Goal: Plan of Care Review  Outcome: Ongoing (interventions implemented as appropriate)   Vss.new admit from PACU. Pt very drowsy but arousable. Drifts back to sleep quickly.low urine output but clear yellow, Dr. Ye aware.

## 2020-08-03 NOTE — TELEPHONE ENCOUNTER
DENIS PT'S MOTHER CALLED. SHE NEEDS TO CANCEL HER 8/5 PORT FLUSH APPT.    PT IS IN [facility] AND DR GERONIMO ESPARZA HER SURGEON SAID THEY WILL DO HER PORT FLUSH WHILE IN THE HOSPITAL.     PLEASE CANCEL 8/5 APPT.    BEST CALL BACK # [phone]

## 2020-08-04 LAB
ANION GAP SERPL CALCULATED.3IONS-SCNC: 6.7 MMOL/L (ref 5–15)
APTT PPP: 29.5 SECONDS (ref 22.7–35.4)
BASOPHILS # BLD AUTO: 0.01 10*3/MM3 (ref 0–0.2)
BASOPHILS NFR BLD AUTO: 0.1 % (ref 0–1.5)
BUN SERPL-MCNC: 8 MG/DL (ref 6–20)
BUN/CREAT SERPL: 16.3 (ref 7–25)
CALCIUM SPEC-SCNC: 8.8 MG/DL (ref 8.6–10.5)
CHLORIDE SERPL-SCNC: 102 MMOL/L (ref 98–107)
CO2 SERPL-SCNC: 23.3 MMOL/L (ref 22–29)
CREAT SERPL-MCNC: 0.49 MG/DL (ref 0.57–1)
CYTO UR: NORMAL
DEPRECATED RDW RBC AUTO: 41.9 FL (ref 37–54)
EOSINOPHIL # BLD AUTO: 0 10*3/MM3 (ref 0–0.4)
EOSINOPHIL NFR BLD AUTO: 0 % (ref 0.3–6.2)
ERYTHROCYTE [DISTWIDTH] IN BLOOD BY AUTOMATED COUNT: 11.7 % (ref 12.3–15.4)
GFR SERPL CREATININE-BSD FRML MDRD: 139 ML/MIN/1.73
GLUCOSE SERPL-MCNC: 131 MG/DL (ref 65–99)
HCT VFR BLD AUTO: 37.6 % (ref 34–46.6)
HGB BLD-MCNC: 12.8 G/DL (ref 12–15.9)
IMM GRANULOCYTES # BLD AUTO: 0.02 10*3/MM3 (ref 0–0.05)
IMM GRANULOCYTES NFR BLD AUTO: 0.2 % (ref 0–0.5)
INR PPP: 1.11 (ref 0.9–1.1)
LAB AP CASE REPORT: NORMAL
LYMPHOCYTES # BLD AUTO: 0.68 10*3/MM3 (ref 0.7–3.1)
LYMPHOCYTES NFR BLD AUTO: 6.6 % (ref 19.6–45.3)
Lab: NORMAL
MAGNESIUM SERPL-MCNC: 2.4 MG/DL (ref 1.6–2.6)
MCH RBC QN AUTO: 33 PG (ref 26.6–33)
MCHC RBC AUTO-ENTMCNC: 34 G/DL (ref 31.5–35.7)
MCV RBC AUTO: 96.9 FL (ref 79–97)
MONOCYTES # BLD AUTO: 0.6 10*3/MM3 (ref 0.1–0.9)
MONOCYTES NFR BLD AUTO: 5.8 % (ref 5–12)
NEUTROPHILS NFR BLD AUTO: 87.3 % (ref 42.7–76)
NEUTROPHILS NFR BLD AUTO: 9 10*3/MM3 (ref 1.7–7)
NRBC BLD AUTO-RTO: 0.1 /100 WBC (ref 0–0.2)
PATH REPORT.FINAL DX SPEC: NORMAL
PATH REPORT.GROSS SPEC: NORMAL
PLATELET # BLD AUTO: 273 10*3/MM3 (ref 140–450)
PMV BLD AUTO: 8.6 FL (ref 6–12)
POTASSIUM SERPL-SCNC: 4.1 MMOL/L (ref 3.5–5.2)
PROTHROMBIN TIME: 14.2 SECONDS (ref 11.7–14.2)
RBC # BLD AUTO: 3.88 10*6/MM3 (ref 3.77–5.28)
SODIUM SERPL-SCNC: 132 MMOL/L (ref 136–145)
WBC # BLD AUTO: 10.31 10*3/MM3 (ref 3.4–10.8)

## 2020-08-04 PROCEDURE — 83735 ASSAY OF MAGNESIUM: CPT | Performed by: COLON & RECTAL SURGERY

## 2020-08-04 PROCEDURE — 25010000002 HYDROMORPHONE PER 4 MG: Performed by: COLON & RECTAL SURGERY

## 2020-08-04 PROCEDURE — 25010000002 HEPARIN (PORCINE) PER 1000 UNITS: Performed by: COLON & RECTAL SURGERY

## 2020-08-04 PROCEDURE — 85730 THROMBOPLASTIN TIME PARTIAL: CPT | Performed by: COLON & RECTAL SURGERY

## 2020-08-04 PROCEDURE — 25010000002 METHOCARBAMOL 1000 MG/10ML SOLUTION: Performed by: COLON & RECTAL SURGERY

## 2020-08-04 PROCEDURE — 80048 BASIC METABOLIC PNL TOTAL CA: CPT | Performed by: COLON & RECTAL SURGERY

## 2020-08-04 PROCEDURE — 85025 COMPLETE CBC W/AUTO DIFF WBC: CPT | Performed by: COLON & RECTAL SURGERY

## 2020-08-04 PROCEDURE — 25010000002 HYDRALAZINE PER 20 MG: Performed by: COLON & RECTAL SURGERY

## 2020-08-04 PROCEDURE — 85610 PROTHROMBIN TIME: CPT | Performed by: COLON & RECTAL SURGERY

## 2020-08-04 RX ORDER — HYDRALAZINE HYDROCHLORIDE 20 MG/ML
10 INJECTION INTRAMUSCULAR; INTRAVENOUS EVERY 6 HOURS PRN
Status: DISCONTINUED | OUTPATIENT
Start: 2020-08-04 | End: 2020-08-07 | Stop reason: HOSPADM

## 2020-08-04 RX ORDER — METHOCARBAMOL 100 MG/ML
500 INJECTION, SOLUTION INTRAMUSCULAR; INTRAVENOUS EVERY 8 HOURS
Status: DISCONTINUED | OUTPATIENT
Start: 2020-08-04 | End: 2020-08-07

## 2020-08-04 RX ADMIN — LORAZEPAM 0.5 MG: 0.5 TABLET ORAL at 13:46

## 2020-08-04 RX ADMIN — ACETAMINOPHEN 1000 MG: 500 TABLET, FILM COATED ORAL at 00:02

## 2020-08-04 RX ADMIN — HEPARIN SODIUM 5000 UNITS: 5000 INJECTION INTRAVENOUS; SUBCUTANEOUS at 21:03

## 2020-08-04 RX ADMIN — CELECOXIB 200 MG: 100 CAPSULE ORAL at 08:04

## 2020-08-04 RX ADMIN — HYDROMORPHONE HYDROCHLORIDE 0.25 MG: 1 INJECTION, SOLUTION INTRAMUSCULAR; INTRAVENOUS; SUBCUTANEOUS at 01:03

## 2020-08-04 RX ADMIN — CELECOXIB 200 MG: 100 CAPSULE ORAL at 21:04

## 2020-08-04 RX ADMIN — HYDROMORPHONE HYDROCHLORIDE 0.25 MG: 1 INJECTION, SOLUTION INTRAMUSCULAR; INTRAVENOUS; SUBCUTANEOUS at 17:22

## 2020-08-04 RX ADMIN — ALVIMOPAN 12 MG: 12 CAPSULE ORAL at 21:04

## 2020-08-04 RX ADMIN — ACETAMINOPHEN 1000 MG: 500 TABLET, FILM COATED ORAL at 05:08

## 2020-08-04 RX ADMIN — ACETAMINOPHEN 1000 MG: 500 TABLET, FILM COATED ORAL at 13:46

## 2020-08-04 RX ADMIN — ACETAMINOPHEN 1000 MG: 500 TABLET, FILM COATED ORAL at 18:15

## 2020-08-04 RX ADMIN — CETIRIZINE HYDROCHLORIDE 10 MG: 10 TABLET, FILM COATED ORAL at 08:04

## 2020-08-04 RX ADMIN — SODIUM CHLORIDE, POTASSIUM CHLORIDE, SODIUM LACTATE AND CALCIUM CHLORIDE 500 ML: 600; 310; 30; 20 INJECTION, SOLUTION INTRAVENOUS at 09:58

## 2020-08-04 RX ADMIN — HYDRALAZINE HYDROCHLORIDE 10 MG: 20 INJECTION INTRAMUSCULAR; INTRAVENOUS at 11:22

## 2020-08-04 RX ADMIN — ALVIMOPAN 12 MG: 12 CAPSULE ORAL at 08:04

## 2020-08-04 RX ADMIN — METHOCARBAMOL 500 MG: 100 INJECTION, SOLUTION INTRAMUSCULAR; INTRAVENOUS at 16:12

## 2020-08-04 RX ADMIN — GABAPENTIN 600 MG: 300 CAPSULE ORAL at 08:04

## 2020-08-04 RX ADMIN — SODIUM CHLORIDE, POTASSIUM CHLORIDE, SODIUM LACTATE AND CALCIUM CHLORIDE 50 ML/HR: 600; 310; 30; 20 INJECTION, SOLUTION INTRAVENOUS at 18:13

## 2020-08-04 RX ADMIN — HYDROMORPHONE HYDROCHLORIDE 0.25 MG: 1 INJECTION, SOLUTION INTRAMUSCULAR; INTRAVENOUS; SUBCUTANEOUS at 11:07

## 2020-08-04 RX ADMIN — HEPARIN SODIUM 5000 UNITS: 5000 INJECTION INTRAVENOUS; SUBCUTANEOUS at 13:46

## 2020-08-04 RX ADMIN — HYDROMORPHONE HYDROCHLORIDE 0.25 MG: 1 INJECTION, SOLUTION INTRAMUSCULAR; INTRAVENOUS; SUBCUTANEOUS at 21:04

## 2020-08-04 RX ADMIN — HYDROMORPHONE HYDROCHLORIDE 0.25 MG: 1 INJECTION, SOLUTION INTRAMUSCULAR; INTRAVENOUS; SUBCUTANEOUS at 08:10

## 2020-08-04 RX ADMIN — ESCITALOPRAM 20 MG: 20 TABLET, FILM COATED ORAL at 16:12

## 2020-08-04 RX ADMIN — HEPARIN SODIUM 5000 UNITS: 5000 INJECTION INTRAVENOUS; SUBCUTANEOUS at 05:09

## 2020-08-04 RX ADMIN — GABAPENTIN 600 MG: 300 CAPSULE ORAL at 21:04

## 2020-08-04 RX ADMIN — HYDROMORPHONE HYDROCHLORIDE 0.25 MG: 1 INJECTION, SOLUTION INTRAMUSCULAR; INTRAVENOUS; SUBCUTANEOUS at 05:09

## 2020-08-04 RX ADMIN — HYDROMORPHONE HYDROCHLORIDE 0.25 MG: 1 INJECTION, SOLUTION INTRAMUSCULAR; INTRAVENOUS; SUBCUTANEOUS at 14:17

## 2020-08-04 NOTE — PLAN OF CARE
Problem: Patient Care Overview  Goal: Plan of Care Review  Outcome: Ongoing (interventions implemented as appropriate)  Flowsheets (Taken 8/4/2020 3896)  Progress: improving  Plan of Care Reviewed With: patient    Note:   Vitals Stable. BP and HR elevated - prn hydralazine given, now BP WNL. Pt complains of pain consistently throughout the day - Dilaudid, ativan, and robaxin given. Clear liquids - pt not hungry, only taking in a small amount of fluids. LR bolus given this am - now LR going at 50ml/h. +ambulation in hallway - encouraged to increase frequency. Waiting ostomy function. MAGAN and Braswell remain in place. Will continue to monitor.       Goal: Individualization and Mutuality  Outcome: Ongoing (interventions implemented as appropriate)  Goal: Discharge Needs Assessment  Outcome: Ongoing (interventions implemented as appropriate)  Goal: Interprofessional Rounds/Family Conf  Outcome: Ongoing (interventions implemented as appropriate)     Problem: Pain, Acute (Adult)  Goal: Identify Related Risk Factors and Signs and Symptoms  Outcome: Ongoing (interventions implemented as appropriate)  Goal: Acceptable Pain Control/Comfort Level  Outcome: Ongoing (interventions implemented as appropriate)     Problem: Fall Risk (Adult)  Goal: Identify Related Risk Factors and Signs and Symptoms  Outcome: Ongoing (interventions implemented as appropriate)  Goal: Absence of Fall  Outcome: Ongoing (interventions implemented as appropriate)

## 2020-08-04 NOTE — NURSING NOTE
"CWOCN- patient post op day 1. Just starting to have a little stool out from the stoma. Pouch is intact. Will change tomorrow. Patient has her own supplies but I think she is using  2 piece, stomahesive powder and a belt. She says she has a \"heat rash\" around the stoma. May need to use antifungal powder. It is not causing itching. Will evaluate her pouching and skin with pouch change and make recommendations. Patient states that her  actually changes her pouch but she knows the steps.  "

## 2020-08-04 NOTE — PLAN OF CARE
Problem: Fall Risk (Adult)  Goal: Absence of Fall  Outcome: Ongoing (interventions implemented as appropriate)     Problem: Pain, Acute (Adult)  Goal: Acceptable Pain Control/Comfort Level  Outcome: Ongoing (interventions implemented as appropriate)     Problem: Patient Care Overview  Goal: Plan of Care Review  Outcome: Ongoing (interventions implemented as appropriate)  Flowsheets (Taken 8/4/2020 2590)  Progress: improving  Outcome Summary: Pt appeared to sleep well, bp elevated at times, pain managed with iv and oral meds, pt educated on need to rely on oral pain meds more, pt reported increased incisional pain from last surgery, no nausea, ambulated in diggs with assist, jillian draining serosanginous fluid 140cc total this shift, decreased amount on each check, f/c intact draining adequate amount of danyel urine approx. 575 cc, received approx 550 cc ivf, oral intake approx 200cc, pt c/o l hand tingling and l pointer finger numbness at beginning of shift with some mild improvement by end of shift, note left for MD, no output from ileostomy, refused to do IS even with encouragement, remains in [facility]

## 2020-08-05 ENCOUNTER — APPOINTMENT (OUTPATIENT)
Dept: ONCOLOGY | Facility: HOSPITAL | Age: 40
End: 2020-08-05

## 2020-08-05 LAB
APTT PPP: 32.8 SECONDS (ref 22.7–35.4)
APTT PPP: 50.7 SECONDS (ref 22.7–35.4)
INR PPP: 1.2 (ref 0.9–1.1)
PROTHROMBIN TIME: 15 SECONDS (ref 11.7–14.2)

## 2020-08-05 PROCEDURE — 25010000002 METHOCARBAMOL 1000 MG/10ML SOLUTION: Performed by: COLON & RECTAL SURGERY

## 2020-08-05 PROCEDURE — 85730 THROMBOPLASTIN TIME PARTIAL: CPT | Performed by: COLON & RECTAL SURGERY

## 2020-08-05 PROCEDURE — 25010000002 HYDROMORPHONE PER 4 MG: Performed by: COLON & RECTAL SURGERY

## 2020-08-05 PROCEDURE — 25010000002 ONDANSETRON PER 1 MG: Performed by: COLON & RECTAL SURGERY

## 2020-08-05 PROCEDURE — 85610 PROTHROMBIN TIME: CPT | Performed by: COLON & RECTAL SURGERY

## 2020-08-05 PROCEDURE — 25010000002 HEPARIN (PORCINE) PER 1000 UNITS: Performed by: COLON & RECTAL SURGERY

## 2020-08-05 RX ORDER — FAMOTIDINE 10 MG/ML
20 INJECTION, SOLUTION INTRAVENOUS EVERY 12 HOURS SCHEDULED
Status: DISCONTINUED | OUTPATIENT
Start: 2020-08-06 | End: 2020-08-07 | Stop reason: HOSPADM

## 2020-08-05 RX ORDER — HEPARIN SODIUM 10000 [USP'U]/100ML
14.4 INJECTION, SOLUTION INTRAVENOUS
Status: DISCONTINUED | OUTPATIENT
Start: 2020-08-05 | End: 2020-08-07

## 2020-08-05 RX ORDER — DEXTROSE, SODIUM CHLORIDE, AND POTASSIUM CHLORIDE 5; .45; .15 G/100ML; G/100ML; G/100ML
40 INJECTION INTRAVENOUS CONTINUOUS
Status: DISCONTINUED | OUTPATIENT
Start: 2020-08-05 | End: 2020-08-07

## 2020-08-05 RX ADMIN — CELECOXIB 200 MG: 100 CAPSULE ORAL at 20:11

## 2020-08-05 RX ADMIN — HYDROMORPHONE HYDROCHLORIDE 0.25 MG: 1 INJECTION, SOLUTION INTRAMUSCULAR; INTRAVENOUS; SUBCUTANEOUS at 10:51

## 2020-08-05 RX ADMIN — ESCITALOPRAM 20 MG: 20 TABLET, FILM COATED ORAL at 17:12

## 2020-08-05 RX ADMIN — ACETAMINOPHEN 1000 MG: 500 TABLET, FILM COATED ORAL at 23:32

## 2020-08-05 RX ADMIN — ACETAMINOPHEN 1000 MG: 500 TABLET, FILM COATED ORAL at 18:24

## 2020-08-05 RX ADMIN — HYDROMORPHONE HYDROCHLORIDE 0.25 MG: 1 INJECTION, SOLUTION INTRAMUSCULAR; INTRAVENOUS; SUBCUTANEOUS at 20:15

## 2020-08-05 RX ADMIN — LORAZEPAM 0.5 MG: 0.5 TABLET ORAL at 00:55

## 2020-08-05 RX ADMIN — ALVIMOPAN 12 MG: 12 CAPSULE ORAL at 08:19

## 2020-08-05 RX ADMIN — METHOCARBAMOL 500 MG: 100 INJECTION, SOLUTION INTRAMUSCULAR; INTRAVENOUS at 00:46

## 2020-08-05 RX ADMIN — HEPARIN SODIUM 5000 UNITS: 5000 INJECTION INTRAVENOUS; SUBCUTANEOUS at 05:18

## 2020-08-05 RX ADMIN — HYDROMORPHONE HYDROCHLORIDE 0.25 MG: 1 INJECTION, SOLUTION INTRAMUSCULAR; INTRAVENOUS; SUBCUTANEOUS at 00:55

## 2020-08-05 RX ADMIN — METHOCARBAMOL 500 MG: 100 INJECTION, SOLUTION INTRAMUSCULAR; INTRAVENOUS at 23:32

## 2020-08-05 RX ADMIN — ACETAMINOPHEN 1000 MG: 500 TABLET, FILM COATED ORAL at 05:19

## 2020-08-05 RX ADMIN — POTASSIUM CHLORIDE, DEXTROSE MONOHYDRATE AND SODIUM CHLORIDE 75 ML/HR: 150; 5; 450 INJECTION, SOLUTION INTRAVENOUS at 14:33

## 2020-08-05 RX ADMIN — HYDROMORPHONE HYDROCHLORIDE 0.25 MG: 1 INJECTION, SOLUTION INTRAMUSCULAR; INTRAVENOUS; SUBCUTANEOUS at 17:12

## 2020-08-05 RX ADMIN — GABAPENTIN 600 MG: 300 CAPSULE ORAL at 08:19

## 2020-08-05 RX ADMIN — ACETAMINOPHEN 1000 MG: 500 TABLET, FILM COATED ORAL at 12:15

## 2020-08-05 RX ADMIN — METHOCARBAMOL 500 MG: 100 INJECTION, SOLUTION INTRAMUSCULAR; INTRAVENOUS at 09:55

## 2020-08-05 RX ADMIN — LORAZEPAM 0.5 MG: 0.5 TABLET ORAL at 22:45

## 2020-08-05 RX ADMIN — CELECOXIB 200 MG: 100 CAPSULE ORAL at 08:19

## 2020-08-05 RX ADMIN — METHOCARBAMOL 500 MG: 100 INJECTION, SOLUTION INTRAMUSCULAR; INTRAVENOUS at 15:41

## 2020-08-05 RX ADMIN — ACETAMINOPHEN 1000 MG: 500 TABLET, FILM COATED ORAL at 00:47

## 2020-08-05 RX ADMIN — ALVIMOPAN 12 MG: 12 CAPSULE ORAL at 20:11

## 2020-08-05 RX ADMIN — ONDANSETRON 4 MG: 2 INJECTION INTRAMUSCULAR; INTRAVENOUS at 23:41

## 2020-08-05 RX ADMIN — LORAZEPAM 0.5 MG: 0.5 TABLET ORAL at 14:33

## 2020-08-05 RX ADMIN — HEPARIN SODIUM 14.4 UNITS/KG/HR: 10000 INJECTION, SOLUTION INTRAVENOUS at 15:36

## 2020-08-05 RX ADMIN — CETIRIZINE HYDROCHLORIDE 10 MG: 10 TABLET, FILM COATED ORAL at 08:19

## 2020-08-05 RX ADMIN — GABAPENTIN 600 MG: 300 CAPSULE ORAL at 20:11

## 2020-08-05 NOTE — PLAN OF CARE
"Pt rates abd pain 8/10, IV pain med admin X2 this shift. PRN Ativan admin X1 this shift. Pt states \"I walked in the diggs three times.\" LR 50mL/hr infusing. Tingling sensation continues bilat hands and feet.  "

## 2020-08-05 NOTE — PROGRESS NOTES
Discharge Planning Assessment  [facility]     Patient Name: Carole Weaver  MRN: [id number]  Today's Date: 8/5/2020    Admit Date: 8/3/2020    Discharge Needs Assessment     Row Name 08/05/20 0909       Living Environment    Lives With  spouse;child(zandra), dependent    Current Living Arrangements  home/apartment/condo    Potentially Unsafe Housing Conditions  other (see comments) no concerns     Primary Care Provided by  self    Provides Primary Care For  child(zandra)    Family Caregiver if Needed  spouse    Quality of Family Relationships  helpful;involved;supportive    Able to Return to Prior Arrangements  yes       Resource/Environmental Concerns    Resource/Environmental Concerns  none    Transportation Concerns  car, none       Transition Planning    Patient/Family Anticipates Transition to  home    Patient/Family Anticipated Services at Transition  none    Transportation Anticipated  family or friend will provide       Discharge Needs Assessment    Readmission Within the Last 30 Days  no previous admission in last 30 days    Concerns to be Addressed  no discharge needs identified;denies needs/concerns at this time    Equipment Currently Used at Home  colostomy/ostomy supplies    Anticipated Changes Related to Illness  none    Equipment Needed After Discharge  none        Discharge Plan     Row Name 08/05/20 0909       Plan    Plan  Home with spouse     Provided Post Acute Provider List?  N/A    Provided Post Acute Provider Quality & Resource List?  N/A    Patient/Family in Agreement with Plan  yes    Plan Comments  CCP met with patient at bedside. CCP role explained and discharge planning discussed. Face sheet verified and patient's PCP is Dr. Vela. Patient lives with her spouse and child. Patient has one step to the entrance of her single story home. Patient is independent with her ADLS and does not use DME. Patient has BSC and shower chair in her bathroom. Patient has ostomy supplies through [employer].  Patient has used [facility] in the past but does not feel like home health is needed. Patient denies any SNF/OP PT. Patient plans to return home and states her  can assist as needed. CCP will follow for any needs to arise. Imelda RODRIGUEZ         Destination      Coordination has not been started for this encounter.      Durable Medical Equipment      Coordination has not been started for this encounter.      Dialysis/Infusion      Coordination has not been started for this encounter.      Home Medical Care      Coordination has not been started for this encounter.      Therapy      Coordination has not been started for this encounter.      Community Resources      Coordination has not been started for this encounter.          Demographic Summary     Row Name 08/05/20 0908       General Information    Admission Type  inpatient    Arrived From  physician office    Referral Source  admission list    Reason for Consult  discharge planning    Preferred Language  English     Used During This Interaction  no        Functional Status     Row Name 08/05/20 0909       Functional Status    Usual Activity Tolerance  good    Current Activity Tolerance  good       Functional Status, IADL    Medications  independent    Meal Preparation  independent    Housekeeping  independent    Laundry  independent    Shopping  independent       Mental Status    General Appearance WDL  WDL       Mental Status Summary    Recent Changes in Mental Status/Cognitive Functioning  no changes        Psychosocial    No documentation.       Abuse/Neglect    No documentation.       Legal    No documentation.       Substance Abuse    No documentation.       Patient Forms    No documentation.           JENNIFER Hammonds

## 2020-08-05 NOTE — PROGRESS NOTES
Progress Note    Pod 2      S: positive flatus,  negative BM. positive ambulating. Pain controlled with eras    O:  Temp:  [97.8 °F (36.6 °C)-98.6 °F (37 °C)] 97.8 °F (36.6 °C)  Heart Rate:  [100-115] 100  Resp:  [16-20] 18  BP: (131-162)/() 156/100      Intake/Output Summary (Last 24 hours) at 8/5/2020 1228  Last data filed at 8/5/2020 1215  Gross per 24 hour   Intake 1950 ml   Output 1815 ml   Net 135 ml       Abd:   soft, nondistended  Incision: clean, dry      Results from last 7 days   Lab Units 08/04/20  0540   WBC 10*3/mm3 10.31   HEMOGLOBIN g/dL 12.8   HEMATOCRIT % 37.6   PLATELETS 10*3/mm3 273         A/P: 40 y.o. female with s/p LOW ANTERIOR COLON RESECTION, COLOANAL ANASTOMOSIS, MOBILIZATION SPLENIC FLEXURE, * Panel 2 does not exist *  Continue sips  [place] stays

## 2020-08-05 NOTE — PLAN OF CARE
Problem: Patient Care Overview  Goal: Plan of Care Review  Outcome: Ongoing (interventions implemented as appropriate)  Flowsheets (Taken 8/5/2020 2970)  Progress: improving  Plan of Care Reviewed With: patient    Note:   Vitals stable. C/o pain today - dilaudid, ativan, and robaxin given. +ambulation in hallway. [place] and MAGAN in place. Taking some clear liquids. No output from ostomy. Started on Heparin drip. IVF changed to [id number]. Family at bedside supportive of pt.       Goal: Individualization and Mutuality  Outcome: Ongoing (interventions implemented as appropriate)  Goal: Discharge Needs Assessment  Outcome: Ongoing (interventions implemented as appropriate)  Goal: Interprofessional Rounds/Family Conf  Outcome: Ongoing (interventions implemented as appropriate)     Problem: Pain, Acute (Adult)  Goal: Identify Related Risk Factors and Signs and Symptoms  Outcome: Ongoing (interventions implemented as appropriate)  Goal: Acceptable Pain Control/Comfort Level  Outcome: Ongoing (interventions implemented as appropriate)     Problem: Fall Risk (Adult)  Goal: Identify Related Risk Factors and Signs and Symptoms  Outcome: Ongoing (interventions implemented as appropriate)  Goal: Absence of Fall  Outcome: Ongoing (interventions implemented as appropriate)

## 2020-08-05 NOTE — NURSING NOTE
"CWOCN follow up- changed pouch and evaluated pouching system with patient. She reports that she and her  change the pouch 3 times/week. Each time they remove the pouch, there is stool on her skin. Recently, she has had a rash as well. She is using a flat 2 1/4\" wafer, flat barrier ring, belt, stomahesive powder and skin prep.  When we removed the pouch, she does have a dip below the stoma. I think the flat barrier ring and belt are not filling this dip enough so she is leaking. Recommend to use a convex appliance. I provided the 2 1/4\" [place] convex with a flat barrier ring. I think she could use the thin barrier ring. Will monitor and reiterate changes. Will order samples and provide the order numbers. Patient appreciative. Rash around stoma has improved and skin is no longer denuded however there is a ring of dark pink skin. Instructed patient on use of antifungal powder and skin prep.   There was a very small amount of stool in the pouch- this is the same stool from yesterday's assessment. No new stool noted, no gas noted this assessment.   Ongoing monitoring and teaching.     08/05/20 1101   Ileostomy RUQ   Placement Date/Time: 03/01/20 0542   Placed by External Staff?: (c)   Hand Hygiene Completed: Yes  Location: RUQ   Stomal Appliance 2 piece;Intact;Changed;Drainable   Stoma Appearance round;rosebud appearance;moist;pink   Peristomal Assessment Pink   Accessories/Skin Care convex wafer;antifungal powder;barrier substance over peristomal skin;skin barrier ring   Stoma Function other (see comments)  (scant amount of stool at the stoma- same stool as yesterday)   Treatment Bag change     "

## 2020-08-06 LAB
ALBUMIN SERPL-MCNC: 2.3 G/DL (ref 3.5–5.2)
ANION GAP SERPL CALCULATED.3IONS-SCNC: 8.6 MMOL/L (ref 5–15)
APTT PPP: 93.5 SECONDS (ref 22.7–35.4)
BASOPHILS # BLD AUTO: 0.03 10*3/MM3 (ref 0–0.2)
BASOPHILS NFR BLD AUTO: 0.3 % (ref 0–1.5)
BUN SERPL-MCNC: 8 MG/DL (ref 6–20)
BUN/CREAT SERPL: 13.8 (ref 7–25)
CALCIUM SPEC-SCNC: 8.4 MG/DL (ref 8.6–10.5)
CHLORIDE SERPL-SCNC: 103 MMOL/L (ref 98–107)
CO2 SERPL-SCNC: 21.4 MMOL/L (ref 22–29)
CREAT SERPL-MCNC: 0.58 MG/DL (ref 0.57–1)
DEPRECATED RDW RBC AUTO: 38.8 FL (ref 37–54)
EOSINOPHIL # BLD AUTO: 0.46 10*3/MM3 (ref 0–0.4)
EOSINOPHIL NFR BLD AUTO: 4.3 % (ref 0.3–6.2)
ERYTHROCYTE [DISTWIDTH] IN BLOOD BY AUTOMATED COUNT: 11.5 % (ref 12.3–15.4)
GFR SERPL CREATININE-BSD FRML MDRD: 115 ML/MIN/1.73
GLUCOSE SERPL-MCNC: 99 MG/DL (ref 65–99)
HCT VFR BLD AUTO: 34.7 % (ref 34–46.6)
HGB BLD-MCNC: 11.5 G/DL (ref 12–15.9)
IMM GRANULOCYTES # BLD AUTO: 0.07 10*3/MM3 (ref 0–0.05)
IMM GRANULOCYTES NFR BLD AUTO: 0.7 % (ref 0–0.5)
LYMPHOCYTES # BLD AUTO: 0.72 10*3/MM3 (ref 0.7–3.1)
LYMPHOCYTES NFR BLD AUTO: 6.7 % (ref 19.6–45.3)
MAGNESIUM SERPL-MCNC: 2.2 MG/DL (ref 1.6–2.6)
MCH RBC QN AUTO: 30.9 PG (ref 26.6–33)
MCHC RBC AUTO-ENTMCNC: 33.1 G/DL (ref 31.5–35.7)
MCV RBC AUTO: 93.3 FL (ref 79–97)
MONOCYTES # BLD AUTO: 0.39 10*3/MM3 (ref 0.1–0.9)
MONOCYTES NFR BLD AUTO: 3.6 % (ref 5–12)
NEUTROPHILS NFR BLD AUTO: 84.4 % (ref 42.7–76)
NEUTROPHILS NFR BLD AUTO: 9.08 10*3/MM3 (ref 1.7–7)
NRBC BLD AUTO-RTO: 0 /100 WBC (ref 0–0.2)
PHOSPHATE SERPL-MCNC: 2.7 MG/DL (ref 2.5–4.5)
PLATELET # BLD AUTO: 256 10*3/MM3 (ref 140–450)
PMV BLD AUTO: 8.5 FL (ref 6–12)
POTASSIUM SERPL-SCNC: 3.7 MMOL/L (ref 3.5–5.2)
RBC # BLD AUTO: 3.72 10*6/MM3 (ref 3.77–5.28)
SODIUM SERPL-SCNC: 133 MMOL/L (ref 136–145)
WBC # BLD AUTO: 10.75 10*3/MM3 (ref 3.4–10.8)

## 2020-08-06 PROCEDURE — 85025 COMPLETE CBC W/AUTO DIFF WBC: CPT | Performed by: COLON & RECTAL SURGERY

## 2020-08-06 PROCEDURE — 25010000002 ONDANSETRON PER 1 MG: Performed by: COLON & RECTAL SURGERY

## 2020-08-06 PROCEDURE — 83735 ASSAY OF MAGNESIUM: CPT | Performed by: COLON & RECTAL SURGERY

## 2020-08-06 PROCEDURE — 25010000002 METHOCARBAMOL 1000 MG/10ML SOLUTION: Performed by: COLON & RECTAL SURGERY

## 2020-08-06 PROCEDURE — 25010000002 HEPARIN (PORCINE) PER 1000 UNITS: Performed by: COLON & RECTAL SURGERY

## 2020-08-06 PROCEDURE — 80069 RENAL FUNCTION PANEL: CPT | Performed by: COLON & RECTAL SURGERY

## 2020-08-06 PROCEDURE — 85730 THROMBOPLASTIN TIME PARTIAL: CPT | Performed by: COLON & RECTAL SURGERY

## 2020-08-06 PROCEDURE — 25010000002 HYDROMORPHONE PER 4 MG: Performed by: COLON & RECTAL SURGERY

## 2020-08-06 RX ADMIN — GABAPENTIN 600 MG: 300 CAPSULE ORAL at 09:43

## 2020-08-06 RX ADMIN — ONDANSETRON 4 MG: 2 INJECTION INTRAMUSCULAR; INTRAVENOUS at 20:51

## 2020-08-06 RX ADMIN — ACETAMINOPHEN 1000 MG: 500 TABLET, FILM COATED ORAL at 06:06

## 2020-08-06 RX ADMIN — FAMOTIDINE 20 MG: 10 INJECTION INTRAVENOUS at 09:43

## 2020-08-06 RX ADMIN — METHOCARBAMOL 500 MG: 100 INJECTION, SOLUTION INTRAMUSCULAR; INTRAVENOUS at 14:20

## 2020-08-06 RX ADMIN — HEPARIN SODIUM 18.4 UNITS/KG/HR: 10000 INJECTION, SOLUTION INTRAVENOUS at 06:01

## 2020-08-06 RX ADMIN — GABAPENTIN 600 MG: 300 CAPSULE ORAL at 20:51

## 2020-08-06 RX ADMIN — HEPARIN SODIUM 18.4 UNITS/KG/HR: 10000 INJECTION, SOLUTION INTRAVENOUS at 22:23

## 2020-08-06 RX ADMIN — FAMOTIDINE 20 MG: 10 INJECTION INTRAVENOUS at 00:35

## 2020-08-06 RX ADMIN — POTASSIUM CHLORIDE, DEXTROSE MONOHYDRATE AND SODIUM CHLORIDE 75 ML/HR: 150; 5; 450 INJECTION, SOLUTION INTRAVENOUS at 03:38

## 2020-08-06 RX ADMIN — LORAZEPAM 0.5 MG: 0.5 TABLET ORAL at 23:38

## 2020-08-06 RX ADMIN — ACETAMINOPHEN 1000 MG: 500 TABLET, FILM COATED ORAL at 23:38

## 2020-08-06 RX ADMIN — HYDROMORPHONE HYDROCHLORIDE 0.25 MG: 1 INJECTION, SOLUTION INTRAMUSCULAR; INTRAVENOUS; SUBCUTANEOUS at 20:51

## 2020-08-06 RX ADMIN — SCOPALAMINE 1 PATCH: 1 PATCH, EXTENDED RELEASE TRANSDERMAL at 06:02

## 2020-08-06 RX ADMIN — ACETAMINOPHEN 1000 MG: 500 TABLET, FILM COATED ORAL at 12:30

## 2020-08-06 RX ADMIN — ESCITALOPRAM 20 MG: 20 TABLET, FILM COATED ORAL at 17:57

## 2020-08-06 RX ADMIN — METHOCARBAMOL 500 MG: 100 INJECTION, SOLUTION INTRAMUSCULAR; INTRAVENOUS at 23:38

## 2020-08-06 RX ADMIN — CETIRIZINE HYDROCHLORIDE 10 MG: 10 TABLET, FILM COATED ORAL at 09:43

## 2020-08-06 RX ADMIN — POTASSIUM CHLORIDE, DEXTROSE MONOHYDRATE AND SODIUM CHLORIDE 40 ML/HR: 150; 5; 450 INJECTION, SOLUTION INTRAVENOUS at 20:51

## 2020-08-06 RX ADMIN — FAMOTIDINE 20 MG: 10 INJECTION INTRAVENOUS at 20:51

## 2020-08-06 RX ADMIN — CELECOXIB 200 MG: 100 CAPSULE ORAL at 09:43

## 2020-08-06 RX ADMIN — CELECOXIB 200 MG: 100 CAPSULE ORAL at 20:51

## 2020-08-06 RX ADMIN — ACETAMINOPHEN 1000 MG: 500 TABLET, FILM COATED ORAL at 17:57

## 2020-08-06 NOTE — NURSING NOTE
CWOCN- follow up on ostomy appliance. Patient says the convexity feels fine. We will change it tomorrow and assess her skin after making some modifications to the pouching system.   Patient has water brown stool in pouch. She has no questions today.

## 2020-08-06 NOTE — PAYOR COMM NOTE
"Carole Weaver (40 y.o. Female)     U/D FOR REF # [id number]      BIN LIMA   P# [phone]  F# [phone]    Date of Birth Social Security Number Address Home Phone MRN    1980  [id number] [facility] [place] [address] [phone] [id number]    Druze Marital Status          Church        Admission Date Admission Type Admitting Provider Attending Provider Department, Room/Bed    8/3/20 Elective Padmaja Ye MD Allen, Nechol L, MD [address], [address]1    Discharge Date Discharge Disposition Discharge Destination                       Attending Provider:  Padmaja Ye MD    Allergies:  No Known Allergies    Isolation:  None   Infection:  None   Code Status:  CPR    Ht:  167.6 cm (66\")   Wt:  104 kg (228 lb 4.8 oz)    Admission Cmt:  None   Principal Problem:  Rectal cancer (CMS/HCC) [C20] More...                 Active Insurance as of 8/3/2020     Primary Coverage     Payor Plan Insurance Group Employer/Plan Group    ANTHOM Latam [facility] [employer] BLUE [facility] PPO [id number]     Payor Plan Address Payor Plan Phone Number Payor Plan Fax Number Effective Dates    [address] [phone]  1/1/2018 - None Entered    [place] [address]       Subscriber Name Subscriber Birth Date Member ID       JUSTUS WEAEVR 1980 [id number]                 Emergency Contacts      (Rel.) Home Phone Work Phone Mobile Phone    Justus Weaver (Spouse) [phone] -- --    RAJESH CRONINA (Mother) -- -- [phone]              Operative/Procedure Notes (last 48 hours) (Notes from 08/04/20 1202 through 08/06/20 1202)    No notes of this type exist for this encounter.          Physician Progress Notes (last 48 hours) (Notes from 08/04/20 1203 through 08/06/20 1203)      Padmaja Ye MD at 08/06/20 0994          Progress Note    Pod 3      S: positive flatus,  positive BM - >600 from ostomy. positive ambulating. Pain controlled with eras. Little bit more " of appetite    O:  Temp:  [97.6 °F (36.4 °C)-98.8 °F (37.1 °C)] 97.6 °F (36.4 °C)  Heart Rate:  [100-113] 102  Resp:  [18] 18  BP: (129-156)/() 132/86      Intake/Output Summary (Last 24 hours) at 8/6/2020 0927  Last data filed at 8/6/2020 0644  Gross per 24 hour   Intake 2080 ml   Output 2455 ml   Net -375 ml       Abd:   soft, non distended  Incision: clean, dry  Ostomy ppp    Results from last 7 days   Lab Units 08/06/20  0415   WBC 10*3/mm3 10.75   HEMOGLOBIN g/dL 11.5*   HEMATOCRIT % 34.7   PLATELETS 10*3/mm3 256     Results from last 7 days   Lab Units 08/06/20  0415   SODIUM mmol/L 133*   POTASSIUM mmol/L 3.7   CHLORIDE mmol/L 103   CO2 mmol/L 21.4*   BUN mg/dL 8   CREATININE mg/dL 0.58   GLUCOSE mg/dL 99   CALCIUM mg/dL 8.4*   PHOSPHORUS mg/dL 2.7       Results from last 7 days   Lab Units 08/06/20  0415   MAGNESIUM mg/dL 2.2         A/P: 40 y.o. female with s/p LOW ANTERIOR COLON RESECTION, COLOANAL ANASTOMOSIS, MOBILIZATION SPLENIC FLEXURE, * Panel 2 does not exist *    Reg diet  D/c manzo  Keep heparin ggt         Electronically signed by Padmaja Ye MD at 08/06/20 1050     Padmaja Ye MD at 08/05/20 1228          Progress Note    Pod 2      S: positive flatus,  negative BM. positive ambulating. Pain controlled with eras    O:  Temp:  [97.8 °F (36.6 °C)-98.6 °F (37 °C)] 97.8 °F (36.6 °C)  Heart Rate:  [100-115] 100  Resp:  [16-20] 18  BP: (131-162)/() 156/100      Intake/Output Summary (Last 24 hours) at 8/5/2020 1228  Last data filed at 8/5/2020 1215  Gross per 24 hour   Intake 1950 ml   Output 1815 ml   Net 135 ml       Abd:   soft, nondistended  Incision: clean, dry      Results from last 7 days   Lab Units 08/04/20  0540   WBC 10*3/mm3 10.31   HEMOGLOBIN g/dL 12.8   HEMATOCRIT % 37.6   PLATELETS 10*3/mm3 273         A/P: 40 y.o. female with s/p LOW ANTERIOR COLON RESECTION, COLOANAL ANASTOMOSIS, MOBILIZATION SPLENIC FLEXURE, * Panel 2 does not exist *  Continue sips  Manzo  stays             Electronically signed by Padmaja Ye MD at 08/05/20 1920     Padmaja Ye MD at 08/04/20 1637          Progress Note    Pod 1      S: positive flatus,  positive BM. positive ambulating.  Complain of cramping and pain and abdominal bloating  O:  Temp:  [98 °F (36.7 °C)-98.5 °F (36.9 °C)] 98.1 °F (36.7 °C)  Heart Rate:  [105-125] 115  Resp:  [16-20] 18  BP: (148-198)/() 148/88      Intake/Output Summary (Last 24 hours) at 8/4/2020 1637  Last data filed at 8/4/2020 1351  Gross per 24 hour   Intake 2277 ml   Output 1605 ml   Net 672 ml       Abd:   soft, non distended  Incision: clean, dry  Ostomy pink patent productive with stool at os  Results from last 7 days   Lab Units 08/04/20  0540   WBC 10*3/mm3 10.31   HEMOGLOBIN g/dL 12.8   HEMATOCRIT % 37.6   PLATELETS 10*3/mm3 273     Results from last 7 days   Lab Units 08/04/20  0540   SODIUM mmol/L 132*   POTASSIUM mmol/L 4.1   CHLORIDE mmol/L 102   CO2 mmol/L 23.3   BUN mg/dL 8   CREATININE mg/dL 0.49*   GLUCOSE mg/dL 131*   CALCIUM mg/dL 8.8       Results from last 7 days   Lab Units 08/04/20  0540   MAGNESIUM mg/dL 2.4         A/P: 40 y.o. female with s/p LOW ANTERIOR COLON RESECTION, COLOANAL ANASTOMOSIS, MOBILIZATION SPLENIC FLEXURE, * Panel 2 does not exist *    Continue sips  We will add Robaxin  Encouraged ambulation  Discussed not force-feeding         Electronically signed by Padmaja Ye MD at 08/04/20 1639        All medication doses during the admission are shown, including meds that are no longer on order.   Scheduled Meds Sorted by Name   for Carole Weaver as of 8/4/20 through 8/6/20     1 Day 3 Days 7 Days 10 Days < Today >    Legend:                          Inactive     Active     Other Encounter    Linked               Medications 08/04/20 08/05/20 08/06/20   acetaminophen (OFIRMEV) injection 1,000 mg   Dose: 1,000 mg  Freq: Once Route: IV  Start: 08/03/20 1219 End: 08/03/20 1241    Order specific questions:   Drug  Name: tylenol  Reason for Non-Formulary npo npr         acetaminophen (TYLENOL) tablet 1,000 mg   Dose: 1,000 mg  Freq: Every 6 Hours Route: PO  Start: 08/03/20 1219    0002   0508      1346   1815        0047   0519   1215     1824   2332        0606   1219   1819        acetaminophen (TYLENOL) tablet 1,000 mg   Dose: 1,000 mg  Freq: Every 6 Hours Route: PO  Start: 08/03/20 0546 End: 08/03/20 1210         alvimopan (ENTEREG) capsule 12 mg   Dose: 12 mg  Freq: 2 Times Daily Route: PO  Start: 08/04/20 0900 End: 08/11/20 0859    Admin Instructions:   Start the morning after surgery. Discontinue Entereg once patient has first bowel movement. Discontinue Entereg once opiods have been discontinued.Discontinue at discharge. For Inpatient use only. Maximum 15 doses (pre and post-op).  Inpatient use only, max 15 total doses (pre and post op).  Discontinue after first BM or when narcotics have been discontinued.    Order specific questions:   Is this medication for a partial large or small bowel resection with primary anastomosis? Yes  Has the patient recieved daily opioids for the past 7 days? No    0804   2104        0819 2011        (5797) [C]   2100          alvimopan (ENTEREG) capsule 12 mg   Dose: 12 mg  Freq: Once Route: PO  Start: 08/03/20 0546 End: 08/03/20 0647    Admin Instructions:   Contraindicated in end stage renal failure or severe hepatic impairment.  Administer dose 30 minutes to 5 hours prior to surgery.  Inpatient use only, max 15 total doses (pre and post op).  Discontinue after first BM or when narcotics have been discontinued.    Order specific questions:   Is this medication for a partial large or small bowel resection with primary anastomosis? Yes  Has the patient recieved daily opioids for the past 7 days? No         bupivacaine (PF) (MARCAINE) 0.25 % injection   Route: IJ  Start: 08/03/20 0736 End: 08/03/20 0736         bupivacaine liposome (EXPAREL) 1.3 % injection   Route: INFILTRATION  Start:  08/03/20 0736 End: 08/03/20 0736         celecoxib (CeleBREX) capsule 200 mg   Dose: 200 mg  Freq: Every 12 Hours Scheduled Route: PO  Indications Comment: Post-op Pain  Start: 08/03/20 2000 End: 08/07/20 0930    Admin Instructions:   Take with food if GI upset occurs.  If given for pain, use the following pain scale:  Mild Pain = Pain Score of 1-3, CPOT 1-2  Moderate Pain = Pain Score of 4-6, CPOT 3-4  Severe Pain = Pain Score of 7-10, CPOT 5-8    0804   2104        0819 2011 0943   2100          celecoxib (CeleBREX) capsule 200 mg   Dose: 200 mg  Freq: Once Route: PO  Start: 08/03/20 0617 End: 08/03/20 0654    Admin Instructions:   Take with food if GI upset occurs.  If given for pain, use the following pain scale:  Mild Pain = Pain Score of 1-3, CPOT 1-2  Moderate Pain = Pain Score of 4-6, CPOT 3-4  Severe Pain = Pain Score of 7-10, CPOT 5-8         celecoxib (CeleBREX) capsule 200 mg   Dose: 200 mg  Freq: Every 12 Hours Scheduled Route: PO  Start: 08/03/20 0900 End: 08/03/20 0615    Admin Instructions:   Take with food if GI upset occurs.  If given for pain, use the following pain scale:  Mild Pain = Pain Score of 1-3, CPOT 1-2  Moderate Pain = Pain Score of 4-6, CPOT 3-4  Severe Pain = Pain Score of 7-10, CPOT 5-8         cetirizine (zyrTEC) tablet 10 mg   Dose: 10 mg  Freq: Daily Route: PO  Start: 08/03/20 1530    0804          0819          0943            ertapenem (INVanz) 1 g/100 mL 0.9% NS VTB (mbp)   Dose: 1 g  Freq: Once Route: IV  Indications of Use: PERIOPERATIVE PHARMACOPROPHYLAXIS  Start: 08/03/20 0546 End: 08/03/20 0738    Admin Instructions:   Caution: Look alike/sound alike drug alert.         escitalopram (LEXAPRO) tablet 20 mg   Dose: 20 mg  Freq: Every Evening Route: PO  Start: 08/03/20 1700    Admin Instructions:   Caution: Look alike/sound alike drug alert.    7652          1712          1704            famotidine (PEPCID) injection 20 mg   Dose: 20 mg  Freq: Every 12 Hours  Scheduled Route: IV  Start: 08/06/20 0045    Admin Instructions:   Dilute to 10 mL total volume and give IV push over 2 minutes.      0035 0943 2100        famotidine (PEPCID) injection 20 mg   Dose: 20 mg  Freq: Once Route: IV  Start: 08/03/20 0622 End: 08/03/20 0654    Admin Instructions:   Dilute to 10 mL total volume and give IV push over 2 minutes.         gabapentin (NEURONTIN) capsule 600 mg   Dose: 600 mg  Freq: Every 12 Hours Scheduled Route: PO  Start: 08/03/20 2000 End: 08/07/20 0930    Admin Instructions:   For patients less than 65 years of age.      0804 2104 0819 2011 0943 2100          gabapentin (NEURONTIN) capsule 600 mg   Dose: 600 mg  Freq: Once Route: PO  Start: 08/03/20 0617 End: 08/03/20 0648    Admin Instructions:            gabapentin (NEURONTIN) capsule 600 mg   Dose: 600 mg  Freq: 3 Times Daily Route: PO  Start: 08/03/20 0900 End: 08/03/20 0615    Admin Instructions:            heparin (porcine) 5000 UNIT/ML injection 5,000 Units   Dose: 5,000 Units  Freq: Every 8 Hours Scheduled Route: SC  Indications Comment: Prophylaxis of Venous Thromboembolism  Start: 08/03/20 2000 End: 08/05/20 1231    0509   1346   2103      0518   1231-D/C'd         lactated ringers bolus 500 mL   Dose: 500 mL  Freq: Once Route: IV  Last Dose: 500 mL (08/04/20 0958)  Start: 08/04/20 1030 End: 08/04/20 1058    0958              Methocarbamol (ROBAXIN) injection 500 mg   Dose: 500 mg  Freq: Every 8 Hours Route: IV  Start: 08/04/20 1600    Admin Instructions:   1. For IV Admin: Give while recumbent maintain position for 15-30 min. Give slow IV push over 5 min not to exceed 3mL/min 2. For IM Admin: a max of 5mL can be administered into each gluteal region.    1612          0046   0955   1541     2332          0800   1600          Scopolamine (TRANSDERM-SCOP) 1.5 MG/3DAYS patch 1 patch   Dose: 1 patch  Freq: Every 72 Hours Route: TD  Start: 08/03/20 0627    Admin Instructions:         0601    0602          sodium chloride 0.9 % flush 3 mL   Dose: 3 mL  Freq: Every 12 Hours Scheduled Route: IV  Start: 08/03/20 0900 End: 08/03/20 1210         Medications 08/04/20 08/05/20 08/06/20       Continuous Meds Sorted by Name   for Carole Weaver as of 8/4/20 through 8/6/20    Legend:                          Inactive     Active     Other Encounter    Linked               Medications 08/04/20 08/05/20 08/06/20   dextrose 5 % and sodium chloride 0.45 % with KCl 20 mEq/L infusion   Rate: 40 mL/hr Dose: 40 mL/hr  Freq: Continuous Route: IV  Last Dose: 40 mL/hr (08/06/20 0944)  Start: 08/05/20 1330     1433   1434        0338   0944          heparin 28711 units/250 mL (100 units/mL) in 0.45 % NaCl infusion   Rate: 14.97 mL/hr Dose: 14.4 Units/kg/hr  Weight Dosing Info: 104 kg  Freq: Titrated Route: IV  Indications of Use: Post Surgical - Other  Last Dose: 18.4 Units/kg/hr (08/06/20 0601)  Start: 08/05/20 1330    Admin Instructions:   Weight Based Heparin Nomogram: VTE / PE: Initial Heparin Infusion 18 units/kg/hr    aPTT Less Than 60: Increase Dose By 4 units/kg/hr.  aPTT 60-70: Increase Dose By 2 units/kg/hr.  aPTT 71-95: No Dose Change  aPTT : Decrease Dose By 2 units/kg/hr.  aPTT Greater Than 115: Hold Infusion For 1 hour.  Decrease Dose By 3 units/kg/hr. Repeat aPTT 6 Hours After Restarted.  If aPTT Remains Greater Than 115 Stop Heparin Drip & Contact Provider.  RN to release aPTT order 6 hours after heparin bolus 6 hours after any heparin rate change     1536   6004   2242      0601   0724          lactated ringers infusion   Rate: 50 mL/hr Dose: 50 mL/hr  Freq: Continuous Route: IV  Last Dose: 50 mL/hr (08/05/20 0819)  Start: 08/03/20 1530 End: 08/05/20 1231    0804   1813        0819   1231-D/C'd         lactated ringers infusion   Freq: Continuous PRN  Start: 08/03/20 0700 End: 08/03/20 1207         lactated ringers infusion   Rate: 9 mL/hr Dose: 9 mL/hr  Freq: Continuous Route: IV  Last Dose: 9 mL/hr  (08/03/20 0646)  Start: 08/03/20 0622 End: 08/03/20 1430         lidocaine 4 mg/mL in dextrose 5% infusion   Freq: Continuous PRN Route: IV  Last Dose: Stopped (08/03/20 1151)  Start: 08/03/20 0740 End: 08/03/20 1207         Medications 08/04/20 08/05/20 08/06/20       PRN Meds Sorted by Name   for Carole Weaver as of 8/4/20 through 8/6/20    Legend:                          Inactive     Active     Other Encounter    Linked               Medications 08/04/20 08/05/20 08/06/20   acetaminophen (TYLENOL) tablet 650 mg   Dose: 650 mg  Freq: Once As Needed Route: PO  PRN Reason: Mild Pain   Start: 08/03/20 1159 End: 08/03/20 1414         Or  acetaminophen (TYLENOL) suppository 650 mg   Dose: 650 mg  Freq: Once As Needed Route: RE  PRN Reason: Mild Pain   Start: 08/03/20 1159 End: 08/03/20 1414         bupivacaine-EPINEPHrine (MARCAINE w/EPI) injection   Freq: As Needed  Start: 08/03/20 0812 End: 08/03/20 1207         dexamethasone (DECADRON) injection   Freq: As Needed Route: IV  Start: 08/03/20 0742 End: 08/03/20 1207         diphenhydrAMINE (BENADRYL) capsule 25 mg   Dose: 25 mg  Freq: Every 30 Minutes PRN Route: PO  PRN Reason: Itching  PRN Comment: May repeat x 1  Indications of Use: EXTRAPYRAMIDAL REACTION,PRURITUS  Start: 08/03/20 1159 End: 08/03/20 1414    Admin Instructions:   Caution: Look alike/sound alike drug alert. This med may be ordered in other forms and routes. Before giving verify the last time the drug was given by any route/form.         diphenhydrAMINE (BENADRYL) injection 12.5 mg   Dose: 12.5 mg  Freq: Every 15 Minutes PRN Route: IV  PRN Reason: Itching  PRN Comment: May repeat x 1  Start: 08/03/20 1159 End: 08/03/20 1414    Admin Instructions:   Caution: Look alike/sound alike drug alert. This med may be ordered in other forms and routes. Before giving verify the last time the drug was given by any route/form.         ePHEDrine injection 5 mg   Dose: 5 mg  Freq: Once As Needed Route: IV  PRN  Comment: symptomatic hypotension - Notify attending anesthesiologist if this needs to be given  Start: 08/03/20 1159 End: 08/03/20 1414    Admin Instructions:   Caution: Look alike/sound alike drug alert   Dilute with NS to 5-10 mg/mL.  Central line preferred, if unavailable use large bore IV access with frequent nurse monitoring of IV site.         fentaNYL citrate (PF) (SUBLIMAZE) injection 50 mcg   Dose: 50 mcg  Freq: Every 5 Minutes PRN Route: IV  PRN Reasons: Moderate Pain ,Severe Pain   Start: 08/03/20 1159 End: 08/03/20 1414    Admin Instructions:   May alternate fentanyl with hydromorphone using fentanyl first.    Maximum total dose of fentanyl is 200 mcg.  If given for pain, use the following pain scale:  Mild Pain = Pain Score of 1-3, CPOT 1-2  Moderate Pain = Pain Score of 4-6, CPOT 3-4  Severe Pain = Pain Score of 7-10, CPOT 5-8         fentaNYL citrate (PF) (SUBLIMAZE) injection 50 mcg   Dose: 50 mcg  Freq: Every 10 Minutes PRN Route: IV  PRN Reason: Severe Pain   Start: 08/03/20 0620 End: 08/03/20 1210    Admin Instructions:   Maximum total dose of fentanyl is 100 mcg.  If given for pain, use the following pain scale:  Mild Pain = Pain Score of 1-3, CPOT 1-2  Moderate Pain = Pain Score of 4-6, CPOT 3-4  Severe Pain = Pain Score of 7-10, CPOT 5-8         flumazenil (ROMAZICON) injection 0.2 mg   Dose: 0.2 mg  Freq: As Needed Route: IV  PRN Comment: for benzodiazepine induced unresponsiveness or sedation  Indications of Use: BENZODIAZEPINE-INDUCED SEDATION  Start: 08/03/20 1159 End: 08/03/20 1414    Admin Instructions:   Notify Anesthesia if given  ** give IV over 15-30 seconds **         hydrALAZINE (APRESOLINE) injection   Freq: As Needed  Start: 08/03/20 0839 End: 08/03/20 1207         hydrALAZINE (APRESOLINE) injection 10 mg   Dose: 10 mg  Freq: Every 6 Hours PRN Route: IV  PRN Reason: High Blood Pressure  Start: 08/04/20 1113    Admin Instructions:   As needed for SBP greater than 160  Caution: Look  alike/sound alike drug alert    1122              hydrALAZINE (APRESOLINE) injection 5 mg   Dose: 5 mg  Freq: Every 10 Minutes PRN Route: IV  PRN Reason: High Blood Pressure  PRN Comment: for systolic blood pressure greater than 180 mmHg or diastolic blood pressure greater than 105 mmHg  Start: 08/03/20 1159 End: 08/03/20 1414    Admin Instructions:   Use labetalol first THEN hydralazine second if labetalol fails to reduce systolic blood pressure to less than 180 mmHg or diastolic blood pressure less than 105 mmHg    Maximum dose of hydralazine is 20 mg  Caution: Look alike/sound alike drug alert         HYDROcodone-acetaminophen (NORCO) 7.5-325 MG per tablet 1 tablet   Dose: 1 tablet  Freq: Once As Needed Route: PO  PRN Reason: Mild Pain   Start: 08/03/20 1159 End: 08/03/20 1414         HYDROmorphone (DILAUDID) injection 0.25 mg   Dose: 0.25 mg  Freq: Every 3 Hours PRN Route: IV  PRN Reason: Severe Pain   Start: 08/03/20 1430 End: 08/13/20 1429    Admin Instructions:   If given for pain, use the following pain scale:  Mild Pain = Pain Score of 1-3, CPOT 1-2  Moderate Pain = Pain Score of 4-6, CPOT 3-4  Severe Pain = Pain Score of 7-10, CPOT 5-8    0103   0509   0810     1107   1417   1722     2104          0054 (4385) [C]   8168 3839   2015           And  naloxone (NARCAN) injection 0.4 mg   Dose: 0.4 mg  Freq: Every 5 Minutes PRN Route: IV  PRN Reason: Respiratory Depression  Start: 08/03/20 1430    Admin Instructions:   If respiratory rate is less than 8 breaths/minute or patient is difficult to arouse stop any narcotics and contact physician.   Administer slow IV push. Repeat as ordered until patient's respiratory rate is greater than 12 breaths/minute.         HYDROmorphone (DILAUDID) injection 0.5 mg   Dose: 0.5 mg  Freq: Every 5 Minutes PRN Route: IV  PRN Reasons: Moderate Pain ,Severe Pain   Start: 08/03/20 1159 End: 08/03/20 1414    Admin Instructions:   May alternate fentanyl with hydromorphone using  fentanyl first.    Maximum total dose of hydromorphone is 2 mg.  If given for pain, use the following pain scale:  Mild Pain = Pain Score of 1-3, CPOT 1-2  Moderate Pain = Pain Score of 4-6, CPOT 3-4  Severe Pain = Pain Score of 7-10, CPOT 5-8         ketamine (KETALAR) injection   Freq: As Needed Route: IV  Start: 08/03/20 0737 End: 08/03/20 1207         labetalol (NORMODYNE,TRANDATE) injection   Freq: As Needed  Start: 08/03/20 0911 End: 08/03/20 1207         lactated ringers infusion   Freq: Continuous PRN  Start: 08/03/20 0700 End: 08/03/20 1207         lidocaine (LTA KIT) 4 % laryngotracheal solution   Freq: As Needed  Start: 08/03/20 0735 End: 08/03/20 1207         lidocaine (XYLOCAINE) 2% injection   Freq: As Needed Route: IJ  Start: 08/03/20 0733 End: 08/03/20 1207         lidocaine 4 mg/mL in dextrose 5% infusion   Freq: Continuous PRN Route: IV  Start: 08/03/20 0740 End: 08/03/20 1207         lidocaine PF 1% (XYLOCAINE) injection 0.5 mL   Dose: 0.5 mL  Freq: Once As Needed Route: IJ  PRN Comment: IV Start  Start: 08/03/20 0620 End: 08/03/20 1210         LORazepam (ATIVAN) tablet 0.5 mg   Dose: 0.5 mg  Freq: Every 8 Hours PRN Route: PO  PRN Reason: Anxiety  PRN Comment: muscle spasms  Start: 08/03/20 1430 End: 08/13/20 1429    Admin Instructions:    Caution: Look alike/sound alike drug alert    1346          0055   7279   2245         magnesium sulfate injection   Freq: As Needed Route: IV  Start: 08/03/20 0737 End: 08/03/20 1207         metoprolol tartrate (LOPRESSOR) injection   Freq: As Needed  Start: 08/03/20 0828 End: 08/03/20 1207         midazolam (VERSED) injection 1 mg   Dose: 1 mg  Freq: Every 10 Minutes PRN Route: IV  PRN Reason: Anxiety  Indications of Use: ANXIETY  Start: 08/03/20 0620 End: 08/03/20 1210    Admin Instructions:   May repeat dose in 10 minutes x 1 then contact provider for additional orders.           naloxone (NARCAN) injection 0.2 mg   Dose: 0.2 mg  Freq: As Needed Route:  IV  PRN Reasons: Opioid Reversal,Respiratory Depression  PRN Comment: unresponsiveness, decrease oxygen saturation  Indications of Use: ACUTE RESPIRATORY FAILURE,OPIOID-INDUCED RESPIRATORY DEPRESSION  Start: 08/03/20 1159 End: 08/03/20 1414    Admin Instructions:   Notify Anesthesia if given         nitroglycerin (NITROSTAT) SL tablet 0.4 mg   Dose: 0.4 mg  Freq: Every 5 Minutes PRN Route: SL  PRN Reason: Chest Pain  PRN Comment: if systolic BP greater than 100 mm/Hg.  Start: 08/03/20 1430    Admin Instructions:   If pain unrelieved after 3 doses, notify MD.         ondansetron (ZOFRAN) injection   Freq: As Needed Route: IV  Start: 08/03/20 1143 End: 08/03/20 1207         ondansetron (ZOFRAN) injection 4 mg   Dose: 4 mg  Freq: Every 6 Hours PRN Route: IV  PRN Reasons: Nausea,Vomiting  Start: 08/03/20 1430     2341             ondansetron (ZOFRAN) injection 4 mg   Dose: 4 mg  Freq: Once As Needed Route: IV  PRN Reasons: Nausea,Vomiting  Indications of Use: POSTOPERATIVE NAUSEA AND VOMITING  Start: 08/03/20 1159 End: 08/03/20 1414    Admin Instructions:   If BOTH ondansetron (ZOFRAN) and promethazine (PHENERGAN) are ordered use ondansetron first and THEN promethazine IF ondansetron is ineffective.         oxyCODONE-acetaminophen (PERCOCET) 7.5-325 MG per tablet 1 tablet   Dose: 1 tablet  Freq: Once As Needed Route: PO  PRN Reason: Moderate Pain   Start: 08/03/20 1159 End: 08/03/20 1414         Propofol (DIPRIVAN) injection   Freq: As Needed Route: IV  Start: 08/03/20 0733 End: 08/03/20 1207         rocuronium (ZEMURON) injection   Freq: As Needed Route: IV  Start: 08/03/20 0733 End: 08/03/20 1207         sodium chloride (NS) irrigation solution   Freq: As Needed  Start: 08/03/20 0812 End: 08/03/20 1207         sodium chloride 0.9 % flush 3-10 mL   Dose: 3-10 mL  Freq: As Needed Route: IV  PRN Reason: Line Care  Start: 08/03/20 0620 End: 08/03/20 1210         sugammadex (BRIDION) injection   Freq: As Needed  Start:  08/03/20 1148 End: 08/03/20 1207         Medications 08/04/20 08/05/20 08/06/20           Consult Notes (last 48 hours) (Notes from 08/04/20 1203 through 08/06/20 1203)    No notes of this type exist for this encounter.

## 2020-08-06 NOTE — PROGRESS NOTES
Progress Note    Pod 3      S: positive flatus,  positive BM - >600 from ostomy. positive ambulating. Pain controlled with eras. Little bit more of appetite    O:  Temp:  [97.6 °F (36.4 °C)-98.8 °F (37.1 °C)] 97.6 °F (36.4 °C)  Heart Rate:  [100-113] 102  Resp:  [18] 18  BP: (129-156)/() 132/86      Intake/Output Summary (Last 24 hours) at 8/6/2020 0927  Last data filed at 8/6/2020 0644  Gross per 24 hour   Intake 2080 ml   Output 2455 ml   Net -375 ml       Abd:   soft, non distended  Incision: clean, dry  Ostomy ppp    Results from last 7 days   Lab Units 08/06/20  0415   WBC 10*3/mm3 10.75   HEMOGLOBIN g/dL 11.5*   HEMATOCRIT % 34.7   PLATELETS 10*3/mm3 256     Results from last 7 days   Lab Units 08/06/20  0415   SODIUM mmol/L 133*   POTASSIUM mmol/L 3.7   CHLORIDE mmol/L 103   CO2 mmol/L 21.4*   BUN mg/dL 8   CREATININE mg/dL 0.58   GLUCOSE mg/dL 99   CALCIUM mg/dL 8.4*   PHOSPHORUS mg/dL 2.7       Results from last 7 days   Lab Units 08/06/20  0415   MAGNESIUM mg/dL 2.2         A/P: 40 y.o. female with s/p LOW ANTERIOR COLON RESECTION, COLOANAL ANASTOMOSIS, MOBILIZATION SPLENIC FLEXURE, * Panel 2 does not exist *    Reg diet  D/c manzo  Keep heparin ggt

## 2020-08-06 NOTE — PLAN OF CARE
Problem: Patient Care Overview  Goal: Plan of Care Review  Outcome: Ongoing (interventions implemented as appropriate)  Flowsheets (Taken 8/6/2020 0608)  Progress: improving  Plan of Care Reviewed With: patient  Outcome Summary: VSS, ST on monitor with -110s. Pt walked in hallway, pain increased, prn Dilaudid once effective, prn Ativan once effective. Ice packs on abd to help with pain, pt rested soundly last half of shift. MAGAN drain output= 40 mL, F/C output= 550 mL dark danyel urine. Pt not drinking much except sips in between med administration, nausea intermittent, zofran given and scopolamine patch changed, no emesis. No output from ostomy. Heparin gtt infusing as ordered, IVFs infusing. Continue to monitor.      Problem: Pain, Acute (Adult)  Goal: Acceptable Pain Control/Comfort Level  Outcome: Ongoing (interventions implemented as appropriate)  Flowsheets (Taken 8/6/2020 0608)  Acceptable Pain Control/Comfort Level: making progress toward outcome     Problem: Fall Risk (Adult)  Goal: Absence of Fall  Outcome: Ongoing (interventions implemented as appropriate)  Flowsheets (Taken 8/6/2020 0608)  Absence of Fall: achieves outcome     Problem: Surgery Nonspecified (Adult)  Goal: Signs and Symptoms of Listed Potential Problems Will be Absent, Minimized or Managed (Surgery Nonspecified)  Outcome: Ongoing (interventions implemented as appropriate)  Flowsheets (Taken 8/6/2020 0608)  Problems Assessed (Surgery): all  Problems Present (Surgery): bowel motility decreased;pain;postoperative nausea and vomiting;postoperative urinary retention;situational response

## 2020-08-07 ENCOUNTER — READMISSION MANAGEMENT (OUTPATIENT)
Dept: CALL CENTER | Facility: HOSPITAL | Age: 40
End: 2020-08-07

## 2020-08-07 ENCOUNTER — TELEPHONE (OUTPATIENT)
Dept: INTERNAL MEDICINE | Facility: CLINIC | Age: 40
End: 2020-08-07

## 2020-08-07 VITALS
TEMPERATURE: 98.2 F | OXYGEN SATURATION: 98 % | WEIGHT: 228.3 LBS | DIASTOLIC BLOOD PRESSURE: 89 MMHG | HEART RATE: 96 BPM | SYSTOLIC BLOOD PRESSURE: 119 MMHG | RESPIRATION RATE: 18 BRPM | BODY MASS INDEX: 36.85 KG/M2

## 2020-08-07 LAB — APTT PPP: 106.1 SECONDS (ref 22.7–35.4)

## 2020-08-07 PROCEDURE — 85730 THROMBOPLASTIN TIME PARTIAL: CPT | Performed by: COLON & RECTAL SURGERY

## 2020-08-07 PROCEDURE — 25010000002 HYDROMORPHONE PER 4 MG: Performed by: COLON & RECTAL SURGERY

## 2020-08-07 PROCEDURE — 25010000002 METHOCARBAMOL 1000 MG/10ML SOLUTION: Performed by: COLON & RECTAL SURGERY

## 2020-08-07 PROCEDURE — 25010000002 HEPARIN (PORCINE) PER 1000 UNITS: Performed by: COLON & RECTAL SURGERY

## 2020-08-07 RX ORDER — HEPARIN SODIUM (PORCINE) LOCK FLUSH IV SOLN 100 UNIT/ML 100 UNIT/ML
5 SOLUTION INTRAVENOUS AS NEEDED
Status: DISCONTINUED | OUTPATIENT
Start: 2020-08-07 | End: 2020-08-07 | Stop reason: HOSPADM

## 2020-08-07 RX ORDER — METHOCARBAMOL 500 MG/1
500 TABLET, FILM COATED ORAL EVERY 8 HOURS SCHEDULED
Status: DISCONTINUED | OUTPATIENT
Start: 2020-08-07 | End: 2020-08-07 | Stop reason: HOSPADM

## 2020-08-07 RX ORDER — SODIUM CHLORIDE 0.9 % (FLUSH) 0.9 %
10 SYRINGE (ML) INJECTION EVERY 12 HOURS SCHEDULED
Status: DISCONTINUED | OUTPATIENT
Start: 2020-08-07 | End: 2020-08-07 | Stop reason: HOSPADM

## 2020-08-07 RX ORDER — GABAPENTIN 300 MG/1
600 CAPSULE ORAL EVERY 12 HOURS SCHEDULED
Status: DISCONTINUED | OUTPATIENT
Start: 2020-08-07 | End: 2020-08-07 | Stop reason: HOSPADM

## 2020-08-07 RX ORDER — SODIUM CHLORIDE 0.9 % (FLUSH) 0.9 %
10 SYRINGE (ML) INJECTION AS NEEDED
Status: DISCONTINUED | OUTPATIENT
Start: 2020-08-07 | End: 2020-08-07 | Stop reason: HOSPADM

## 2020-08-07 RX ORDER — SODIUM CHLORIDE 0.9 % (FLUSH) 0.9 %
20 SYRINGE (ML) INJECTION AS NEEDED
Status: DISCONTINUED | OUTPATIENT
Start: 2020-08-07 | End: 2020-08-07 | Stop reason: HOSPADM

## 2020-08-07 RX ORDER — METHOCARBAMOL 500 MG/1
500 TABLET, FILM COATED ORAL EVERY 8 HOURS SCHEDULED
Qty: 46 TABLET | Refills: 0 | Status: SHIPPED | OUTPATIENT
Start: 2020-08-07 | End: 2020-09-28

## 2020-08-07 RX ORDER — CELECOXIB 200 MG/1
200 CAPSULE ORAL EVERY 12 HOURS SCHEDULED
Status: DISCONTINUED | OUTPATIENT
Start: 2020-08-07 | End: 2020-08-07 | Stop reason: HOSPADM

## 2020-08-07 RX ADMIN — RIVAROXABAN 20 MG: 20 TABLET, FILM COATED ORAL at 14:14

## 2020-08-07 RX ADMIN — METHOCARBAMOL TABLETS 500 MG: 500 TABLET, COATED ORAL at 15:51

## 2020-08-07 RX ADMIN — HEPARIN SODIUM 16.4 UNITS/KG/HR: 10000 INJECTION, SOLUTION INTRAVENOUS at 07:37

## 2020-08-07 RX ADMIN — METHOCARBAMOL 500 MG: 100 INJECTION, SOLUTION INTRAMUSCULAR; INTRAVENOUS at 07:37

## 2020-08-07 RX ADMIN — CETIRIZINE HYDROCHLORIDE 10 MG: 10 TABLET, FILM COATED ORAL at 09:32

## 2020-08-07 RX ADMIN — GABAPENTIN 600 MG: 300 CAPSULE ORAL at 09:32

## 2020-08-07 RX ADMIN — CELECOXIB 200 MG: 100 CAPSULE ORAL at 09:32

## 2020-08-07 RX ADMIN — LORAZEPAM 0.5 MG: 0.5 TABLET ORAL at 10:53

## 2020-08-07 RX ADMIN — HYDROMORPHONE HYDROCHLORIDE 0.25 MG: 1 INJECTION, SOLUTION INTRAMUSCULAR; INTRAVENOUS; SUBCUTANEOUS at 06:48

## 2020-08-07 RX ADMIN — FAMOTIDINE 20 MG: 10 INJECTION INTRAVENOUS at 09:32

## 2020-08-07 RX ADMIN — ACETAMINOPHEN 1000 MG: 500 TABLET, FILM COATED ORAL at 06:42

## 2020-08-07 RX ADMIN — ACETAMINOPHEN 1000 MG: 500 TABLET, FILM COATED ORAL at 11:41

## 2020-08-07 NOTE — TELEPHONE ENCOUNTER
Hannah from [facility] is calling to make an appointment for patient. Hub attempted warm transfer, unsuccessful.     PCP: Miller Vela  Admit: 8/3/2020  Discharge: 8/7/2020  Diagnosis: Rectal Cancer  Needs appointment within a week.    Please call and schedule with patient at [phone].

## 2020-08-07 NOTE — PLAN OF CARE
Problem: Patient Care Overview  Goal: Plan of Care Review  Outcome: Ongoing (interventions implemented as appropriate)  Flowsheets (Taken 8/7/2020 0600)  Progress: improving  Plan of Care Reviewed With: patient  Outcome Summary: VSS. Frequent pain to abdominal incision- sharp/cramping, prn Dilaudid given twice, effective. Prn Ativan given at bedtime for anxiety and to help sleep, effective. Pt ambulating to BSC independently, UOP 400mL. Ostomy output 250 mL. MAGAN drain 10 mL output. Pt tolerating GI soft diet, encouraged to eat slower smaller bites at a time, prn Zofran given once and effective. Heparin gtt and IVFs infusing. No distress noted.     Problem: Pain, Acute (Adult)  Goal: Acceptable Pain Control/Comfort Level  Outcome: Ongoing (interventions implemented as appropriate)  Flowsheets (Taken 8/7/2020 0600)  Acceptable Pain Control/Comfort Level: making progress toward outcome     Problem: Fall Risk (Adult)  Goal: Absence of Fall  Outcome: Ongoing (interventions implemented as appropriate)  Flowsheets (Taken 8/7/2020 0600)  Absence of Fall: achieves outcome

## 2020-08-07 NOTE — PROGRESS NOTES
Case Management Discharge Note      Final Note: Home with spouse. Denies any needs. Transported by family    Provided Post Acute Provider List?: N/A  Provided Post Acute Provider Quality & Resource List?: N/A    Destination      No service has been selected for the patient.      Durable Medical Equipment      No service has been selected for the patient.      Dialysis/Infusion      No service has been selected for the patient.      Home Medical Care      No service has been selected for the patient.      Therapy      No service has been selected for the patient.      Community Resources      No service has been selected for the patient.        Transportation Services  Private: Car    Final Discharge Disposition Code: 01 - home or self-care

## 2020-08-07 NOTE — NURSING NOTE
"CWOCN follow up- patient did not have any leaking with the convex wafer. I have provided her the numbers and she gives verbal consent for samples. Continue flat barrier ring- I used a thin one today to see how that works. Recommend belt- she says right now she is too sore. I also showed her a LISA soft convex. She may try that as well.   2 1/4\" Dariel convex 2 piece drainable with barrier ring + belt  Patient does not have any questions. Patient observed pouch change and she is able to show her  what changes we made. He performs her ostomy care.      08/07/20 1035   Ileostomy RUQ   Placement Date/Time: 03/01/20 0542   Placed by External Staff?: (c)   Hand Hygiene Completed: Yes  Location: RUQ   Stomal Appliance 2 piece;Intact;Changed;Drainable   Stoma Appearance round;rosebud appearance;moist;red   Peristomal Assessment Intact   Accessories/Skin Care convex wafer;skin barrier ring   Stoma Function flatus;stool   Stool Color green   Stool Consistency loose   Treatment Bag change   Output (mL) 50 mL     "

## 2020-08-07 NOTE — PROGRESS NOTES
Progress Note    Pod 4      S: positive flatus,  positive BM. positive ambulating. Pain controlled with eras    O:  Temp:  [97.8 °F (36.6 °C)-98.2 °F (36.8 °C)] 98.2 °F (36.8 °C)  Heart Rate:  [] 96  Resp:  [18] 18  BP: (113-124)/(87-91) 119/89      Intake/Output Summary (Last 24 hours) at 8/7/2020 1305  Last data filed at 8/7/2020 1051  Gross per 24 hour   Intake 1115 ml   Output 2015 ml   Net -900 ml       Abd:   soft, non distended  Incision: clean, dry  ostomy    Results from last 7 days   Lab Units 08/06/20  0415   WBC 10*3/mm3 10.75   HEMOGLOBIN g/dL 11.5*   HEMATOCRIT % 34.7   PLATELETS 10*3/mm3 256     Results from last 7 days   Lab Units 08/06/20  0415   SODIUM mmol/L 133*   POTASSIUM mmol/L 3.7   CHLORIDE mmol/L 103   CO2 mmol/L 21.4*   BUN mg/dL 8   CREATININE mg/dL 0.58   GLUCOSE mg/dL 99   CALCIUM mg/dL 8.4*   PHOSPHORUS mg/dL 2.7       Results from last 7 days   Lab Units 08/06/20  0415   MAGNESIUM mg/dL 2.2         A/P: 40 y.o. female with s/p LOW ANTERIOR COLON RESECTION, COLOANAL ANASTOMOSIS, MOBILIZATION SPLENIC FLEXURE, * Panel 2 does not exist *    Good ostomy output  D/c jillian   D/c home

## 2020-08-08 NOTE — OUTREACH NOTE
Prep Survey      Responses   [facility] patient discharged from?  [place]   Is LACE score < 7 ?  No   Eligibility  [facility]   Date of Admission  08/03/20   Date of Discharge  08/07/20   Discharge Disposition  Home or Self Care   Discharge diagnosis  low anterior resection with diverting loop ileostomy    COVID-19 Test Status  Negative   Does the patient have one of the following disease processes/diagnoses(primary or secondary)?  General Surgery   Does the patient have Home health ordered?  No   Is there a DME ordered?  No   Prep survey completed?  Yes          Nikole Huber RN

## 2020-08-10 ENCOUNTER — TELEPHONE (OUTPATIENT)
Dept: INTERNAL MEDICINE | Facility: CLINIC | Age: 40
End: 2020-08-10

## 2020-08-10 ENCOUNTER — TRANSITIONAL CARE MANAGEMENT TELEPHONE ENCOUNTER (OUTPATIENT)
Dept: CALL CENTER | Facility: HOSPITAL | Age: 40
End: 2020-08-10

## 2020-08-10 DIAGNOSIS — C20 ADENOCARCINOMA OF RECTUM (HCC): ICD-10-CM

## 2020-08-10 RX ORDER — HYDROCODONE BITARTRATE AND ACETAMINOPHEN 7.5; 325 MG/1; MG/1
1 TABLET ORAL EVERY 6 HOURS PRN
Qty: 120 TABLET | Refills: 0 | Status: SHIPPED | OUTPATIENT
Start: 2020-08-10 | End: 2020-08-11 | Stop reason: SDUPTHER

## 2020-08-10 RX ORDER — DICYCLOMINE HCL 20 MG
20 TABLET ORAL EVERY 6 HOURS
Qty: 120 TABLET | Refills: 0 | Status: SHIPPED | OUTPATIENT
Start: 2020-08-10 | End: 2020-09-03 | Stop reason: SDUPTHER

## 2020-08-10 NOTE — TELEPHONE ENCOUNTER
PATIENT CALLED IN AND STATED SHE NEEDS A REFILL OF HYDROcodone-acetaminophen (NORCO) 7.5-325 MG per tablet    AND BENTYL        SENT TO  [facility]/pharmacy #[id number] - [place], [place] - [address] AT [facility] - [phone]  - [phone]   [phone]      PATIENT CALL BACK  [phone]

## 2020-08-10 NOTE — TELEPHONE ENCOUNTER
Last OV 7/1/20, next:10/9/20.  Levi on file 8/3/20.   Needs to sign CSA.    I do not see the Bentyl in chart.  Do you want to prescribe?

## 2020-08-10 NOTE — OUTREACH NOTE
Call Center TCM Note      Responses   [facility] patient discharged from?  [place]   Does the patient have one of the following disease processes/diagnoses(primary or secondary)?  General Surgery   TCM attempt successful?  Yes   Call end time  1136   Discharge diagnosis  low anterior resection with diverting loop ileostomy    Meds reviewed with patient/caregiver?  Yes   Is the patient having any side effects they believe may be caused by any medication additions or changes?  No   Does the patient have all medications related to this admission filled (includes all antibiotics, pain medications, etc.)  Yes   Is the patient taking all medications as directed (includes completed medication regime)?  Yes   Does the patient have a follow up appointment scheduled with their surgeon?  Yes   Has the patient kept scheduled appointments due by today?  N/A   Comments  Patient has a followup with surgeon on 8/21/2020, declines followup with PCP at this time.    Has home health visited the patient within 72 hours of discharge?  N/A   Psychosocial issues?  No   Did the patient receive a copy of their discharge instructions?  Yes   Nursing interventions  Reviewed instructions with patient   What is the patient's perception of their health status since discharge?  Improving   Nursing interventions  Nurse provided patient education   Is the patient /caregiver able to teach back basic post-op care?  Lifting as instructed by MD in discharge instructions, Take showers only when approved by MD-sponge bathe until then, No tub bath, swimming, or hot tub until instructed by MD, Keep incision areas clean,dry and protected, Drive as instructed by MD in discharge instructions   Is the patient/caregiver able to teach back signs and symptoms of incisional infection?  Increased redness, swelling or pain at the incisonal site, Incisional warmth, Fever, Increased drainage or bleeding, Pus or odor from incision   Is the patient/caregiver  able to teach back steps to recovery at home?  Set small, achievable goals for return to baseline health, Rest and rebuild strength, gradually increase activity, Make a list of questions for surgeon's appointment   Is the patient/caregiver able to teach back the hierarchy of who to call/visit for symptoms/problems? PCP, Specialist, Home health nurse, Urgent Care, ED, 911  Yes   TCM call completed?  Yes          Tony Lyons RN    8/10/2020, 11:37

## 2020-08-11 ENCOUNTER — OFFICE VISIT (OUTPATIENT)
Dept: SURGERY | Facility: CLINIC | Age: 40
End: 2020-08-11

## 2020-08-11 VITALS
TEMPERATURE: 98.4 F | OXYGEN SATURATION: 96 % | BODY MASS INDEX: 35.98 KG/M2 | HEIGHT: 66 IN | HEART RATE: 123 BPM | SYSTOLIC BLOOD PRESSURE: 130 MMHG | WEIGHT: 223.9 LBS | DIASTOLIC BLOOD PRESSURE: 72 MMHG

## 2020-08-11 DIAGNOSIS — C20 RECTAL CANCER (HCC): Primary | ICD-10-CM

## 2020-08-11 PROCEDURE — 99024 POSTOP FOLLOW-UP VISIT: CPT | Performed by: COLON & RECTAL SURGERY

## 2020-08-11 RX ORDER — SODIUM HYPOCHLORITE 2.5 MG/ML
SOLUTION TOPICAL ONCE
Qty: 500 ML | Refills: 1 | Status: SHIPPED | OUTPATIENT
Start: 2020-08-11 | End: 2020-08-14 | Stop reason: HOSPADM

## 2020-08-11 RX ORDER — METRONIDAZOLE 500 MG/1
500 TABLET ORAL 3 TIMES DAILY
Qty: 30 TABLET | Refills: 0 | Status: SHIPPED | OUTPATIENT
Start: 2020-08-11 | End: 2020-08-21

## 2020-08-11 RX ORDER — LEVOFLOXACIN 500 MG/1
500 TABLET, FILM COATED ORAL DAILY
Qty: 10 TABLET | Refills: 0 | Status: SHIPPED | OUTPATIENT
Start: 2020-08-11 | End: 2020-08-21

## 2020-08-11 RX ORDER — HYDROCODONE BITARTRATE AND ACETAMINOPHEN 7.5; 325 MG/1; MG/1
1 TABLET ORAL EVERY 6 HOURS PRN
Qty: 240 TABLET | Refills: 0 | Status: SHIPPED | OUTPATIENT
Start: 2020-08-11 | End: 2021-04-12

## 2020-08-11 NOTE — PROGRESS NOTES
"Carole Weaver is a 40 y.o. female in for follow up of Rectal cancer (CMS/HCC)    p t3 n1 rectal cancer  lap lar with divert ileo 12/2/19  posterior leak  redo lar 8/3/2020     Called with drainage coming from distal end of wound  Smells really bad  Denies any fever  Emptying ostomy in good consistency  Pretty good appetite    /72 (BP Location: Right arm, Patient Position: Sitting, Cuff Size: Large Adult)   Pulse (!) 123   Temp 98.4 °F (36.9 °C)   Ht 167.6 cm (66\")   Wt 102 kg (223 lb 14.4 oz)   SpO2 96%   Breastfeeding No   BMI 36.14 kg/m²   Body mass index is 36.14 kg/m².      PE:  Physical Exam   Constitutional: She appears well-developed. No distress.   HENT:   Head: Normocephalic and atraumatic.   Abdominal: Soft. She exhibits no distension.   Midline wound distal end erythema with purulent drainage   Musculoskeletal: Normal range of motion.   Neurological: She is alert.   Psychiatric: Thought content normal.         Assessment:   1. Rectal cancer (CMS/HCC)    Test post redo LAR 8/3/2020    Plan:  I opened the staples and got copious amounts of purulent drainage.  Irrigated out with Betadine 10 saline  Fascia intact  Packed with wet-to-dry gauze  Wrote for Dakin's quarter percent  Wrote for home health to come out to do wet-to-dry  Wrote for Levaquin and Flagyl  Follow-up in 3 to 4 days  Call if increased drainage        "

## 2020-08-12 ENCOUNTER — HOSPITAL ENCOUNTER (INPATIENT)
Dept: CT IMAGING | Facility: HOSPITAL | Age: 40
LOS: 2 days | Discharge: HOME HEALTH | End: 2020-08-14
Attending: COLON & RECTAL SURGERY | Admitting: COLON & RECTAL SURGERY

## 2020-08-12 ENCOUNTER — OFFICE VISIT (OUTPATIENT)
Dept: SURGERY | Facility: CLINIC | Age: 40
End: 2020-08-12

## 2020-08-12 ENCOUNTER — READMISSION MANAGEMENT (OUTPATIENT)
Dept: CALL CENTER | Facility: HOSPITAL | Age: 40
End: 2020-08-12

## 2020-08-12 ENCOUNTER — TELEPHONE (OUTPATIENT)
Dept: SURGERY | Facility: CLINIC | Age: 40
End: 2020-08-12

## 2020-08-12 VITALS
DIASTOLIC BLOOD PRESSURE: 74 MMHG | SYSTOLIC BLOOD PRESSURE: 90 MMHG | OXYGEN SATURATION: 95 % | BODY MASS INDEX: 36.16 KG/M2 | HEIGHT: 66 IN | HEART RATE: 120 BPM | TEMPERATURE: 97.6 F

## 2020-08-12 DIAGNOSIS — K61.1 PERIRECTAL ABSCESS: ICD-10-CM

## 2020-08-12 DIAGNOSIS — F33.9 MONOPOLAR DEPRESSION (HCC): ICD-10-CM

## 2020-08-12 DIAGNOSIS — K65.1 ABDOMINOPELVIC ABSCESS (HCC): Primary | ICD-10-CM

## 2020-08-12 DIAGNOSIS — K65.1 ABDOMINOPELVIC ABSCESS (HCC): ICD-10-CM

## 2020-08-12 LAB
ALBUMIN SERPL-MCNC: 2.9 G/DL (ref 3.5–5.2)
ALBUMIN/GLOB SERPL: 0.8 G/DL
ALP SERPL-CCNC: 96 U/L (ref 39–117)
ALT SERPL W P-5'-P-CCNC: 19 U/L (ref 1–33)
ANION GAP SERPL CALCULATED.3IONS-SCNC: 10.3 MMOL/L (ref 5–15)
AST SERPL-CCNC: 17 U/L (ref 1–32)
BASOPHILS # BLD AUTO: 0.02 10*3/MM3 (ref 0–0.2)
BASOPHILS NFR BLD AUTO: 0.2 % (ref 0–1.5)
BILIRUB SERPL-MCNC: 0.4 MG/DL (ref 0–1.2)
BUN SERPL-MCNC: 5 MG/DL (ref 6–20)
BUN/CREAT SERPL: 8.5 (ref 7–25)
CALCIUM SPEC-SCNC: 8.4 MG/DL (ref 8.6–10.5)
CHLORIDE SERPL-SCNC: 97 MMOL/L (ref 98–107)
CO2 SERPL-SCNC: 24.7 MMOL/L (ref 22–29)
CREAT SERPL-MCNC: 0.59 MG/DL (ref 0.57–1)
DEPRECATED RDW RBC AUTO: 45.4 FL (ref 37–54)
EOSINOPHIL # BLD AUTO: 0.19 10*3/MM3 (ref 0–0.4)
EOSINOPHIL NFR BLD AUTO: 1.5 % (ref 0.3–6.2)
ERYTHROCYTE [DISTWIDTH] IN BLOOD BY AUTOMATED COUNT: 12.4 % (ref 12.3–15.4)
GFR SERPL CREATININE-BSD FRML MDRD: 112 ML/MIN/1.73
GLOBULIN UR ELPH-MCNC: 3.5 GM/DL
GLUCOSE SERPL-MCNC: 91 MG/DL (ref 65–99)
HCT VFR BLD AUTO: 32.5 % (ref 34–46.6)
HGB BLD-MCNC: 10.7 G/DL (ref 12–15.9)
IMM GRANULOCYTES # BLD AUTO: 0.15 10*3/MM3 (ref 0–0.05)
IMM GRANULOCYTES NFR BLD AUTO: 1.2 % (ref 0–0.5)
INR PPP: 1.38 (ref 0.9–1.1)
LYMPHOCYTES # BLD AUTO: 0.74 10*3/MM3 (ref 0.7–3.1)
LYMPHOCYTES NFR BLD AUTO: 6 % (ref 19.6–45.3)
MCH RBC QN AUTO: 32.2 PG (ref 26.6–33)
MCHC RBC AUTO-ENTMCNC: 32.9 G/DL (ref 31.5–35.7)
MCV RBC AUTO: 97.9 FL (ref 79–97)
MONOCYTES # BLD AUTO: 0.87 10*3/MM3 (ref 0.1–0.9)
MONOCYTES NFR BLD AUTO: 7 % (ref 5–12)
NEUTROPHILS NFR BLD AUTO: 10.41 10*3/MM3 (ref 1.7–7)
NEUTROPHILS NFR BLD AUTO: 84.1 % (ref 42.7–76)
NRBC BLD AUTO-RTO: 0.1 /100 WBC (ref 0–0.2)
PLATELET # BLD AUTO: 402 10*3/MM3 (ref 140–450)
PMV BLD AUTO: 9 FL (ref 6–12)
POTASSIUM SERPL-SCNC: 3.5 MMOL/L (ref 3.5–5.2)
PROT SERPL-MCNC: 6.4 G/DL (ref 6–8.5)
PROTHROMBIN TIME: 16.8 SECONDS (ref 11.7–14.2)
RBC # BLD AUTO: 3.32 10*6/MM3 (ref 3.77–5.28)
SODIUM SERPL-SCNC: 132 MMOL/L (ref 136–145)
WBC # BLD AUTO: 12.38 10*3/MM3 (ref 3.4–10.8)

## 2020-08-12 PROCEDURE — 99024 POSTOP FOLLOW-UP VISIT: CPT | Performed by: COLON & RECTAL SURGERY

## 2020-08-12 PROCEDURE — 25010000002 PIPERACILLIN SOD-TAZOBACTAM PER 1 G: Performed by: COLON & RECTAL SURGERY

## 2020-08-12 PROCEDURE — 74177 CT ABD & PELVIS W/CONTRAST: CPT

## 2020-08-12 PROCEDURE — 80053 COMPREHEN METABOLIC PANEL: CPT | Performed by: COLON & RECTAL SURGERY

## 2020-08-12 PROCEDURE — 25010000002 IOPAMIDOL 61 % SOLUTION: Performed by: COLON & RECTAL SURGERY

## 2020-08-12 PROCEDURE — 0W9H3ZX DRAINAGE OF RETROPERITONEUM, PERCUTANEOUS APPROACH, DIAGNOSTIC: ICD-10-PCS | Performed by: COLON & RECTAL SURGERY

## 2020-08-12 PROCEDURE — U0004 COV-19 TEST NON-CDC HGH THRU: HCPCS | Performed by: COLON & RECTAL SURGERY

## 2020-08-12 PROCEDURE — 85025 COMPLETE CBC W/AUTO DIFF WBC: CPT | Performed by: COLON & RECTAL SURGERY

## 2020-08-12 PROCEDURE — 99222 1ST HOSP IP/OBS MODERATE 55: CPT | Performed by: COLON & RECTAL SURGERY

## 2020-08-12 PROCEDURE — 0 DIATRIZOATE MEGLUMINE & SODIUM PER 1 ML: Performed by: COLON & RECTAL SURGERY

## 2020-08-12 PROCEDURE — 85610 PROTHROMBIN TIME: CPT | Performed by: COLON & RECTAL SURGERY

## 2020-08-12 RX ORDER — HYDROCODONE BITARTRATE AND ACETAMINOPHEN 5; 325 MG/1; MG/1
2 TABLET ORAL EVERY 6 HOURS PRN
Status: DISCONTINUED | OUTPATIENT
Start: 2020-08-12 | End: 2020-08-14 | Stop reason: HOSPADM

## 2020-08-12 RX ORDER — SODIUM CHLORIDE 9 MG/ML
50 INJECTION, SOLUTION INTRAVENOUS CONTINUOUS
Status: DISCONTINUED | OUTPATIENT
Start: 2020-08-12 | End: 2020-08-14 | Stop reason: HOSPADM

## 2020-08-12 RX ORDER — ONDANSETRON 2 MG/ML
4 INJECTION INTRAMUSCULAR; INTRAVENOUS EVERY 6 HOURS PRN
Status: DISCONTINUED | OUTPATIENT
Start: 2020-08-12 | End: 2020-08-14 | Stop reason: HOSPADM

## 2020-08-12 RX ORDER — METHOCARBAMOL 500 MG/1
500 TABLET, FILM COATED ORAL EVERY 8 HOURS SCHEDULED
Status: DISCONTINUED | OUTPATIENT
Start: 2020-08-12 | End: 2020-08-12

## 2020-08-12 RX ORDER — ONDANSETRON 4 MG/1
4 TABLET, FILM COATED ORAL EVERY 6 HOURS PRN
Status: DISCONTINUED | OUTPATIENT
Start: 2020-08-12 | End: 2020-08-14 | Stop reason: HOSPADM

## 2020-08-12 RX ORDER — FAMOTIDINE 20 MG/1
20 TABLET, FILM COATED ORAL 2 TIMES DAILY
Status: DISCONTINUED | OUTPATIENT
Start: 2020-08-12 | End: 2020-08-14 | Stop reason: HOSPADM

## 2020-08-12 RX ORDER — SODIUM CHLORIDE 0.9 % (FLUSH) 0.9 %
10 SYRINGE (ML) INJECTION EVERY 12 HOURS SCHEDULED
Status: DISCONTINUED | OUTPATIENT
Start: 2020-08-12 | End: 2020-08-14 | Stop reason: HOSPADM

## 2020-08-12 RX ORDER — METHOCARBAMOL 500 MG/1
500 TABLET, FILM COATED ORAL EVERY 8 HOURS SCHEDULED
Status: DISCONTINUED | OUTPATIENT
Start: 2020-08-12 | End: 2020-08-14 | Stop reason: HOSPADM

## 2020-08-12 RX ORDER — HYDROCODONE BITARTRATE AND ACETAMINOPHEN 5; 325 MG/1; MG/1
1 TABLET ORAL EVERY 6 HOURS PRN
Status: DISCONTINUED | OUTPATIENT
Start: 2020-08-12 | End: 2020-08-14 | Stop reason: HOSPADM

## 2020-08-12 RX ORDER — SODIUM CHLORIDE 0.9 % (FLUSH) 0.9 %
10 SYRINGE (ML) INJECTION AS NEEDED
Status: DISCONTINUED | OUTPATIENT
Start: 2020-08-12 | End: 2020-08-14 | Stop reason: HOSPADM

## 2020-08-12 RX ORDER — ESCITALOPRAM OXALATE 20 MG/1
20 TABLET ORAL DAILY
Qty: 90 TABLET | Refills: 3 | Status: SHIPPED | OUTPATIENT
Start: 2020-08-12 | End: 2020-11-09 | Stop reason: ALTCHOICE

## 2020-08-12 RX ORDER — ESCITALOPRAM OXALATE 20 MG/1
20 TABLET ORAL DAILY
Status: DISCONTINUED | OUTPATIENT
Start: 2020-08-12 | End: 2020-08-14 | Stop reason: HOSPADM

## 2020-08-12 RX ADMIN — HYDROCODONE BITARTRATE AND ACETAMINOPHEN 2 TABLET: 5; 325 TABLET ORAL at 17:18

## 2020-08-12 RX ADMIN — TAZOBACTAM SODIUM AND PIPERACILLIN SODIUM 3.38 G: 375; 3 INJECTION, SOLUTION INTRAVENOUS at 23:45

## 2020-08-12 RX ADMIN — ESCITALOPRAM 20 MG: 20 TABLET, FILM COATED ORAL at 18:49

## 2020-08-12 RX ADMIN — METHOCARBAMOL TABLETS 500 MG: 500 TABLET, COATED ORAL at 18:49

## 2020-08-12 RX ADMIN — SODIUM CHLORIDE, PRESERVATIVE FREE 10 ML: 5 INJECTION INTRAVENOUS at 20:33

## 2020-08-12 RX ADMIN — SODIUM CHLORIDE 50 ML/HR: 9 INJECTION, SOLUTION INTRAVENOUS at 17:31

## 2020-08-12 RX ADMIN — TAZOBACTAM SODIUM AND PIPERACILLIN SODIUM 3.38 G: 375; 3 INJECTION, SOLUTION INTRAVENOUS at 17:31

## 2020-08-12 RX ADMIN — FAMOTIDINE 20 MG: 20 TABLET, FILM COATED ORAL at 20:32

## 2020-08-12 RX ADMIN — IOPAMIDOL 90 ML: 612 INJECTION, SOLUTION INTRAVENOUS at 12:23

## 2020-08-12 RX ADMIN — DIATRIZOATE MEGLUMINE AND DIATRIZOATE SODIUM 30 ML: 660; 100 LIQUID ORAL; RECTAL at 11:30

## 2020-08-12 NOTE — PROGRESS NOTES
Carole Weaver is a 40 y.o. female in for follow up of No diagnosis found.        There were no vitals taken for this visit.  There is no height or weight on file to calculate BMI.      PE:  Physical Exam      Assessment: No diagnosis found.     Plan:      See admission note

## 2020-08-12 NOTE — PROGRESS NOTES
Pharmacy consult to dose Zosyn    Craole Weaver is a 40 y.o. female  .  Pharmacy consulted to dose Zosyn for intra-abdominal infection per Dr. Padmaja Ye's request.    Current ordered antibiotics DOT: 10 days    Estimated Creatinine Clearance: 154 mL/min (by C-G formula based on SCr of 0.58 mg/dL).  No results found for: CREATININE SCr 0.58 on 8/6  No results found for: WBC  Temp Readings from Last 2 Encounters:   08/12/20 98.5 °F (36.9 °C) (Oral)   08/12/20 97.6 °F (36.4 °C)     Anti-Infectives (From admission, onward)    Ordered     Dose/Rate Route Frequency Start Stop    08/12/20 1653  piperacillin-tazobactam (ZOSYN) 3.375 g in iso-osmotic dextrose 50 ml (premix)     Ordering Provider:  Padmaja Ye MD    3.375 g  over 4 Hours Intravenous Every 8 Hours 08/12/20 2345 08/22/20 2344    08/12/20 1653  piperacillin-tazobactam (ZOSYN) 3.375 g in iso-osmotic dextrose 50 ml (premix)     Ordering Provider:  Padmaja Ye MD    3.375 g  over 30 Minutes Intravenous Once 08/12/20 1745            Baseline cultures:  8/12 COVID 19 (BIO TAP) swab: In process    PLAN:  1. Will give Zosyn 3.375 gm IV x 1 over 30 minutes now followed by Zosyn 3.375 g IV q8h extended infusion over 4 hr based on risk stratification and renal function (CrCl >20 mL/min). Patient's creatinine this admission is pending. Based on recent SCr on 8/6 of 0.58, patient is appropriate for zosyn q8h dosing. Once today's SCr is available, pharmacy will adjust Zosyn dose if needed.     2. Pharmacy will discontinue the Pharmacy to Dose Zosyn consult at this time. Renal function will continue to be monitored and dosing adjustments will be made by pharmacy based on renal function if necessary      Carolyn Moore, PharmD, BCPS  08/12/20 16:53

## 2020-08-12 NOTE — TELEPHONE ENCOUNTER
Caller: Carole Weaver    Relationship: Self    Best call back number: [phone]    Medication needed:   Requested Prescriptions     Pending Prescriptions Disp Refills   • escitalopram (LEXAPRO) 20 MG tablet 90 tablet 3     Sig: Take 1 tablet by mouth Daily.       When do you need the refill by: NOT SPECIFIC     What details did the patient provide when requesting the medication:  PT HAS ONE WEEK SUPPLY     Does the patient have less than a 3 day supply:  [] Yes  [x] No    What is the patient's preferred pharmacy: [facility]/PHARMACY #[id number] [place], [place] - [address] [address] AT [facility] [phone] [facility] [phone]

## 2020-08-13 ENCOUNTER — APPOINTMENT (OUTPATIENT)
Dept: CT IMAGING | Facility: HOSPITAL | Age: 40
End: 2020-08-13

## 2020-08-13 ENCOUNTER — TELEPHONE (OUTPATIENT)
Dept: SURGERY | Facility: CLINIC | Age: 40
End: 2020-08-13

## 2020-08-13 ENCOUNTER — PREP FOR SURGERY (OUTPATIENT)
Dept: OTHER | Facility: HOSPITAL | Age: 40
End: 2020-08-13

## 2020-08-13 DIAGNOSIS — K61.1 PERIRECTAL ABSCESS: Primary | ICD-10-CM

## 2020-08-13 LAB
REF LAB TEST METHOD: NORMAL
SARS-COV-2 RNA RESP QL NAA+PROBE: NOT DETECTED

## 2020-08-13 PROCEDURE — 87015 SPECIMEN INFECT AGNT CONCNTJ: CPT | Performed by: COLON & RECTAL SURGERY

## 2020-08-13 PROCEDURE — 87075 CULTR BACTERIA EXCEPT BLOOD: CPT | Performed by: COLON & RECTAL SURGERY

## 2020-08-13 PROCEDURE — 87205 SMEAR GRAM STAIN: CPT | Performed by: COLON & RECTAL SURGERY

## 2020-08-13 PROCEDURE — 87070 CULTURE OTHR SPECIMN AEROBIC: CPT | Performed by: COLON & RECTAL SURGERY

## 2020-08-13 PROCEDURE — 25010000002 PIPERACILLIN SOD-TAZOBACTAM PER 1 G: Performed by: COLON & RECTAL SURGERY

## 2020-08-13 PROCEDURE — 25010000002 HEPARIN (PORCINE) PER 1000 UNITS: Performed by: COLON & RECTAL SURGERY

## 2020-08-13 PROCEDURE — 99231 SBSQ HOSP IP/OBS SF/LOW 25: CPT | Performed by: COLON & RECTAL SURGERY

## 2020-08-13 PROCEDURE — 25010000003 LIDOCAINE 1 % SOLUTION: Performed by: COLON & RECTAL SURGERY

## 2020-08-13 PROCEDURE — 77012 CT SCAN FOR NEEDLE BIOPSY: CPT

## 2020-08-13 RX ORDER — HEPARIN SODIUM 5000 [USP'U]/ML
5000 INJECTION, SOLUTION INTRAVENOUS; SUBCUTANEOUS EVERY 8 HOURS SCHEDULED
Status: DISCONTINUED | OUTPATIENT
Start: 2020-08-13 | End: 2020-08-14 | Stop reason: HOSPADM

## 2020-08-13 RX ORDER — CLONAZEPAM 1 MG/1
1 TABLET ORAL EVERY 8 HOURS PRN
Status: DISCONTINUED | OUTPATIENT
Start: 2020-08-13 | End: 2020-08-14 | Stop reason: HOSPADM

## 2020-08-13 RX ORDER — LIDOCAINE HYDROCHLORIDE 10 MG/ML
20 INJECTION, SOLUTION INFILTRATION; PERINEURAL ONCE
Status: COMPLETED | OUTPATIENT
Start: 2020-08-13 | End: 2020-08-13

## 2020-08-13 RX ADMIN — ESCITALOPRAM 20 MG: 20 TABLET, FILM COATED ORAL at 08:10

## 2020-08-13 RX ADMIN — SODIUM CHLORIDE, PRESERVATIVE FREE 10 ML: 5 INJECTION INTRAVENOUS at 20:39

## 2020-08-13 RX ADMIN — TAZOBACTAM SODIUM AND PIPERACILLIN SODIUM 3.38 G: 375; 3 INJECTION, SOLUTION INTRAVENOUS at 08:10

## 2020-08-13 RX ADMIN — SODIUM CHLORIDE 50 ML/HR: 9 INJECTION, SOLUTION INTRAVENOUS at 08:10

## 2020-08-13 RX ADMIN — METHOCARBAMOL TABLETS 500 MG: 500 TABLET, COATED ORAL at 22:43

## 2020-08-13 RX ADMIN — METHOCARBAMOL TABLETS 500 MG: 500 TABLET, COATED ORAL at 05:03

## 2020-08-13 RX ADMIN — LIDOCAINE HYDROCHLORIDE 20 ML: 10 INJECTION, SOLUTION INFILTRATION; PERINEURAL at 10:45

## 2020-08-13 RX ADMIN — METHOCARBAMOL TABLETS 500 MG: 500 TABLET, COATED ORAL at 13:35

## 2020-08-13 RX ADMIN — HEPARIN SODIUM 5000 UNITS: 5000 INJECTION INTRAVENOUS; SUBCUTANEOUS at 22:43

## 2020-08-13 RX ADMIN — HYDROCODONE BITARTRATE AND ACETAMINOPHEN 2 TABLET: 5; 325 TABLET ORAL at 18:29

## 2020-08-13 RX ADMIN — TAZOBACTAM SODIUM AND PIPERACILLIN SODIUM 3.38 G: 375; 3 INJECTION, SOLUTION INTRAVENOUS at 22:45

## 2020-08-13 RX ADMIN — HYDROCODONE BITARTRATE AND ACETAMINOPHEN 2 TABLET: 5; 325 TABLET ORAL at 04:34

## 2020-08-13 RX ADMIN — FAMOTIDINE 20 MG: 20 TABLET, FILM COATED ORAL at 08:10

## 2020-08-13 RX ADMIN — FAMOTIDINE 20 MG: 20 TABLET, FILM COATED ORAL at 20:39

## 2020-08-13 RX ADMIN — SODIUM CHLORIDE, PRESERVATIVE FREE 10 ML: 5 INJECTION INTRAVENOUS at 08:10

## 2020-08-13 RX ADMIN — SODIUM CHLORIDE 50 ML/HR: 9 INJECTION, SOLUTION INTRAVENOUS at 12:02

## 2020-08-13 RX ADMIN — CLONAZEPAM 1 MG: 1 TABLET ORAL at 20:39

## 2020-08-13 RX ADMIN — TAZOBACTAM SODIUM AND PIPERACILLIN SODIUM 3.38 G: 375; 3 INJECTION, SOLUTION INTRAVENOUS at 15:38

## 2020-08-13 NOTE — PLAN OF CARE
Pt had PRN pain med x1 this shift. ABD dressing soaked at bottom and leaking, dsg changed wet to dry, drainage was serosanguinous, purulent and malordorous. Cleansed with NS. ABD pad placed over saline moistened kerlix. Pt tolerated well. IVFs cont 50mL/hr.

## 2020-08-13 NOTE — PROGRESS NOTES
[facility]charge Planning Assessment  [facility]     Patient Name: Carole Weaver  MRN: [id number]  Today's Date: 8/13/2020    Admit Date: 8/12/2020    Discharge Needs Assessment     Row Name 08/13/20 1521       Living Environment    Lives With  child(zandra), dependent;spouse    Name(s) of Who Lives With Patient  Lives with her spouse and dependent child    Current Living Arrangements  home/apartment/condo    Primary Care Provided by  self    Provides Primary Care For  child(zandra)    Family Caregiver if Needed  spouse    Quality of Family Relationships  involved    Able to Return to Prior Arrangements  yes       Resource/Environmental Concerns    Resource/Environmental Concerns  none       Transition Planning    Patient/Family Anticipates Transition to  home with family    Patient/Family Anticipated Services at Transition  home health care    Transportation Anticipated  family or friend will provide       Discharge Needs Assessment    Readmission Within the Last 30 Days  current reason for admission unrelated to previous admission    Concerns to be Addressed  care coordination/care conferences;discharge planning    Equipment Currently Used at Home  colostomy/ostomy supplies    Anticipated Changes Related to Illness  none    Equipment Needed After Discharge  other (see comments) Wound Vac and supplies    Discharge Facility/Level of Care Needs  home with home health    Provided Post Acute Provider List?  Refused    Refused Provider List Comment  Had used [facility]  in the past and wanted them again        Discharge Plan     Row Name 08/13/20 1524       Plan    Plan  Home with family and Religion . Home wound vac supplied through [employer]. Family to transport.    Patient/Family in Agreement with Plan  yes    Plan Comments  Met with pt. at bedside. Explained roll of . Face sheet and pharmacy verified. Pt lives with her spouse and dependent child. There is one step to enter home.  DME equipment includes ostomy supplies.   Pt is independent with ADLs. Pt has never been to Rehab. Pt has used [facility] in the past and wants to have them see her at D/C for wound vac. Pt's PCP is Dr Vela. Uses [facility] on [facility]. At discharge, family will transport. Called [facility] to obtain wound vac for D/C. Wound vac to be delivered tomorrow pending insurance approval. Called [facility] and informed of referral. Stated they would be able to accept. Explained that CCP would follow to assess for discharge needs.  Lebron Muniz RN-BC        Destination      Coordination has not been started for this encounter.      Durable Medical Equipment      Service Provider Request Status Selected Services Address Phone Number Fax Number    [facility] Pending - Request Sent N/A [id number] [address] [phone]      Dialysis/Infusion      Coordination has not been started for this encounter.      Home Medical Care      Service Provider Request Status Selected Services Address Phone Number Fax Number    [facility] [place] Accepted N/A [address] [address] [phone]      Therapy      Coordination has not been started for this encounter.      Community Resources      Coordination has not been started for this encounter.          Demographic Summary     Row Name 08/13/20 1515       General Information    Admission Type  inpatient    Arrived From  physician office    Reason for Consult  discharge planning;care coordination/care conference    Preferred Language  English     Used During This Interaction  no        Functional Status     Row Name 08/13/20 1516       Functional Status    Usual Activity Tolerance  good    Current Activity Tolerance  good       Functional Status, IADL    Medications  independent    Meal Preparation  independent    Housekeeping  independent    Laundry  independent    Shopping  independent       Mental Status     General Appearance WDL  WDL       Mental Status Summary    Recent Changes in Mental Status/Cognitive Functioning  no changes                Lebron Muniz RN

## 2020-08-13 NOTE — PLAN OF CARE
Problem: Patient Care Overview  Goal: Plan of Care Review  Flowsheets (Taken 8/12/2020 2002)  Progress: no change  Plan of Care Reviewed With: patient  Outcome Summary: VSS. IVF started. First dose of ABX finished. Norco for pain, Robaxin for muscle spasms. Going for CT guided drain tomorrow. Will continue to monitor.

## 2020-08-13 NOTE — PAYOR COMM NOTE
"WeaverCarole servin (40 y.o. Female)     CLINICAL FOR REF # [id number]      BIN LIMA   P# [phone]  F# [phone]    Date of Birth Social Security Number Address Home Phone MRN    [id number]  [id number] [facility] [id number] [phone] [id number]    Yarsanism Marital Status          Hinduism        Admission Date Admission Type Admitting Provider Attending Provider Department, Room/Bed    8/12/20 Elective Padmaja Ye MD Allen, Nechol L, MD [address], [address]/1    Discharge Date Discharge Disposition Discharge Destination                       Attending Provider:  Padmaja Ye MD    Allergies:  No Known Allergies    Isolation:  None   Infection:  None   Code Status:  CPR    Ht:  167.6 cm (66\")   Wt:  99.3 kg (219 lb)    Admission Cmt:  None   Principal Problem:  None                Active Insurance as of 8/12/2020     Primary Coverage     Payor Plan Insurance Group Employer/Plan Group    [employer]UE [employer] PPO [id number]     Payor Plan Address Payor Plan Phone Number Payor Plan Fax Number Effective Dates    [address] [phone]  1/1/2018 - None Entered    [place] [address]       Subscriber Name Subscriber Birth Date Member ID       JUSTUS WEAVER 1980 [id number]                 Emergency Contacts      (Rel.) Home Phone Work Phone Mobile Phone    Justus Weaver (Spouse) [phone] -- --    DENIS CRONIN (Mother) [phone] -- [phone]               History & Physical      Padmaja Ye MD at 08/12/20 1649          Carole MIRIAM Weaver is a 40 y.o. female who is pod 9 from redo LAR b/c anastomotic disruption has intra-abd and incisional wound infection  Patient was doing well at discharge postop day #4 and then Monday night developed pain at the incision.  It was a significant amount on Tuesday morning and patient call the office.  I had her come in and there was erythema at the distal end of her " incision.  The wound was opened up in the office and purulent drainage was encountered.  Fascia was intact.  Wound was irrigated out with Betadine tinged saline.  Then packed with wet to dry gauze.  Dakin's was ordered.  Started the process of getting home health and wound VAC.  Patient was started on Levaquin and Flagyl.  She denies any fever.  She has had some drainage from the rectum yesterday.  Patient called this morning saying that the drainage from the wound was profuse and had to change it 3-4 times.  I ordered a CAT scan.  It showed an abscess in the subcu tissue at the superior portion of the wound.  It also showed a pelvic abscess near the anastomosis.  Also it showed some fluid around the ileostomy.  I made the decision to admit patient started on IV antibiotics and get interventional radiology to percutaneously drain the abscesses in the abdomen.     Past Medical History:   Diagnosis Date   • Abnormal Pap smear of cervix 02/02/1998    JULIUS I   • Anemia in pregnancy    • Anxiety    • Bilateral epiphora 04/2020   • Bilateral hand swelling 05/02/2020    SEEN AT [facility]   • Cervical lymphadenitis 06/06/2012    SEEN AT [facility]   • Chemotherapy induced neutropenia (CMS/HCC) 04/2020   • Chemotherapy-induced nausea 02/2020   • Chemotherapy-induced thrombocytopenia 05/2020   • Chronic diarrhea    • Colon polyps     FOLLOWED BY DR. GERONIMO ESPARZA   • Cystitis 04/24/2020    WITH DEHYDRATION, ADMITTED TO [facility]   • Drug-induced peripheral neuropathy (CMS/HCC) 05/2020   • Fistula of intestine    • GERD (gastroesophageal reflux disease)    • Hand foot syndrome 05/2020   • Heart murmur     IN CHILDHOOD   • Hematochezia    • Hemorrhoids    • Heterozygous factor V Leiden mutation (CMS/HCC)     DX 8-2-2019   • History of anemia    • History of chemotherapy 2019    FOLLOWED BY DR. ALEXANDRU HUNT   • History of gestational diabetes    • History of pre-eclampsia    • History of radiation therapy 2019    FOLLOWED BY DR. ALEJANDRO  DEBBIE   • History of TB skin testing 2009    TB Skin Test   • HPV in female    • Hypokalemia 2019   • Hypomagnesemia 2019   • IBS (irritable bowel syndrome)    • Ileostomy in place (CMS/[facility])     FOLLOWED BY DR. PADMAJA ESPARZA   • Infected insect bite of neck 2016    SEEN AT [facility]   • Kidney stones 2007    SEEN AT [facility]   • Lump of right breast     SWOLLEN LYMPH NODE   • Monopolar depression (CMS/[facility])    • On anticoagulant therapy    • PIH (pregnancy induced hypertension)    • Pulmonary embolism without acute cor pulmonale (CMS/HCC) 09/20/2019    X 3; ADMITTED TO [facility]   • Pulmonary nodule, right 2020   • Rectal cancer (CMS/[facility]) 2019    INVASIVE MODERATELY DIFFERENTIATED ADENOCARCINOMA, CHEMO AND XRT FINISHED 2019   • Right ureteral stone 10/01/2019    SEEN AT [facility]   • SAB (spontaneous ) 1996     A2-1 INDUCED   • Sciatica    • Sepsis due to cellulitis (CMS/[facility]) 2002    LEFT GREAT TOE, ADMITTED TO [facility]   • Tachycardia 2020   • Urinary urgency 2020       Past Surgical History:   Procedure Laterality Date   • CHOLECYSTECTOMY N/A 10/10/2003    LAPAROSCOPIC WITH CHOLANGIOGRAM, DR. JAMEY TALAVERA AT [facility]   • COLON RESECTION N/A 2019    Procedure: laparoscopic low anterior colon resection with mobilization of splenic flexure and diverting loop ileostomy: ERAS;  Surgeon: Padmaja Esparza MD;  Location: [facility];  Service: General   • COLON RESECTION N/A 8/3/2020    Procedure: LOW ANTERIOR COLON RESECTION, COLOANAL ANASTOMOSIS, MOBILIZATION SPLENIC FLEXURE;  Surgeon: Padmaja Esparza MD;  Location: [facility];  Service: General;  Laterality: N/A;   • COLONOSCOPY N/A 7/15/2020    PATENT ANASTAMOSIS IN MID RECTUM, RESCOPE IN 1 YR, DR. PADMAJA ESPARZA AT [facility]   • COLONOSCOPY W/ POLYPECTOMY N/A 2019    15 MM TUBULOVILLOUS ADENOMA POLYP IN HEPATIC FLEXURE, 20 MMTUBULOVILLOUS ADENOMA WITH HIGH GRADE DYSPLASIA IN RECTOSIGMOID, 4 CM MASS  IN MID RECTUM, PATH: INVASIVE MODERATELY DIFFERENTIATED ADENOCARCINOMA, DR. JENNIFER LI AT [facility]   • DILATATION AND EVACUATION N/A 2009   • ENDOSCOPY N/A 07/08/2019    LA GRADE A ESOPHAGITIS, GASTRITIS, ALL BIOPSIES BENIGN, DR. JENNIFER LI AT [facility]   • PAP SMEAR N/A 01/24/2014   • SIGMOIDOSCOPY N/A 7/24/2019    INFILTRATIVE PARTIALLY OBSTRUCTING LARGE RECTAL CANCER, AREA TATOOED, DR. GERONIMO YE AT [facility]   • SIGMOIDOSCOPY N/A 11/23/2019    AN ULCERATED PARTIALLY OBSTRUCTING MASS IN MID RECTUM, AREA TATTOOED, DR. GERONIMO EY AT [facility]   • TRANSRECTAL ULTRASOUND N/A 7/24/2019    Procedure: ULTRASOUND TRANSRECTAL;  Surgeon: Geronimo Ye MD;  Location: [facility] ENDOSCOPY;  Service: General   • URETEROSCOPY LASER LITHOTRIPSY WITH STENT INSERTION Right 10/30/2019    DR. ESTUARDO BEASLEY AT [place]   • VAGINAL DELIVERY N/A 09/18/1998   • VENOUS ACCESS DEVICE (PORT) INSERTION Left 8/9/2019    Procedure: INSERTION VENOUS ACCESS DEVICE;  Surgeon: Sj Joseph MD;  Location: [facility] OR [facility];  Service: General   • WISDOM TOOTH EXTRACTION Bilateral 1993       Social History:   reports that she has been smoking electronic cigarette and cigarettes. She has a 24.00 pack-year smoking history. She has never used smokeless tobacco. She reports that she drank alcohol. She reports that she does not use drugs.      Marriage status:      Family History   Problem Relation Age of Onset   • Hyperlipidemia Mother    • Colon polyps Mother    • Heart disease Mother    • Hypertension Mother    • Factor V Leiden deficiency Mother    • Skin cancer Father         Squamous in 60s   • Heart disease Paternal Grandfather    • No Known Problems Son    • Cancer Paternal Uncle    • Colon cancer Paternal Uncle    • Diabetes Paternal Uncle    • Mental illness Sister         Addiction   • Colon cancer Maternal Uncle    • Malig Hyperthermia Neg Hx        No current facility-administered medications on file prior to  encounter.      Current Outpatient Medications on File Prior to Encounter   Medication Sig Dispense Refill   • clonazePAM (KlonoPIN) 1 MG tablet TAKE 1 TABLET BY MOUTH 3 (THREE) TIMES A DAY AS NEEDED FOR ANXIETY OR SEIZURES. 90 tablet 0   • dicyclomine (Bentyl) 20 MG tablet Take 1 tablet by mouth Every 6 (Six) Hours. 120 tablet 0   • escitalopram (LEXAPRO) 20 MG tablet Take 1 tablet by mouth Daily. 90 tablet 3   • famotidine (PEPCID) 20 MG tablet TAKE 1 TABLET BY MOUTH TWICE A DAY (Patient taking differently: Take 20 mg by mouth Every Evening.) 180 tablet 1   • HYDROcodone-acetaminophen (NORCO) 7.5-325 MG per tablet Take 1 tablet by mouth Every 6 (Six) Hours As Needed for Moderate Pain . 240 tablet 0   • levoFLOXacin (Levaquin) 500 MG tablet Take 1 tablet by mouth Daily for 10 days. 10 tablet 0   • Loratadine (CLARITIN) 10 MG capsule Take 10 mg by mouth Every Evening.     • methocarbamol (ROBAXIN) 500 MG tablet Take 1 tablet by mouth Every 8 (Eight) Hours. 46 tablet 0   • metroNIDAZOLE (FLAGYL) 500 MG tablet Take 1 tablet by mouth 3 (Three) Times a Day for 10 days. 30 tablet 0   • ondansetron (ZOFRAN) 8 MG tablet Take 1 tablet by mouth 3 (Three) Times a Day As Needed for Nausea or Vomiting. 60 tablet 5   • prochlorperazine (COMPAZINE) 10 MG tablet Take 1 tablet by mouth Every 6 (Six) Hours As Needed for Nausea or Vomiting. 30 tablet 2   • rivaroxaban (XARELTO) 20 MG tablet Take 1 tablet by mouth Daily. Start after finishing starter pack. (Patient taking differently: Take 20 mg by mouth Daily. HOLDING 2 DAYS PRIOR TO SURGERY) 30 tablet 11   • [] sodium hypochlorite (DAKIN'S) 0.25 % topical solution Apply  topically to the appropriate area as directed 1 (One) Time for 1 dose. 500 mL 1   • [DISCONTINUED] escitalopram (LEXAPRO) 20 MG tablet Take 1 tablet by mouth Daily. (Patient taking differently: Take 20 mg by mouth Every Evening.) 90 tablet 3     Allergy  Patient has no known allergies.    Review of Systems    Constitution: Negative for decreased appetite, fever and weight gain.   HENT: Negative for congestion, hearing loss and hoarse voice.    Eyes: Negative for blurred vision, discharge and visual disturbance.   Cardiovascular: Negative for chest pain, cyanosis and leg swelling.   Respiratory: Negative for cough, shortness of breath, sleep disturbances due to breathing and snoring.    Endocrine: Negative for cold intolerance and heat intolerance.   Hematologic/Lymphatic: Does not bruise/bleed easily.   Skin: Negative for itching, poor wound healing and skin cancer.   Musculoskeletal: Negative for arthritis, back pain, joint pain and joint swelling.   Gastrointestinal: Positive for abdominal pain. Negative for change in bowel habit, bowel incontinence and constipation.   Genitourinary: Negative for bladder incontinence, dysuria and hematuria.   Neurological: Negative for brief paralysis, excessive daytime sleepiness, dizziness, focal weakness, headaches, light-headedness and weakness.   Psychiatric/Behavioral: Negative for altered mental status and hallucinations. The patient does not have insomnia.    Allergic/Immunologic: Negative for HIV exposure and persistent infections.       Vitals:    08/12/20 1604   BP: 123/80   Pulse: 104   Resp: 18   Temp: 98.5 °F (36.9 °C)     Body mass index is 35.46 kg/m².    Physical Exam   Constitutional: She is oriented to person, place, and time. She appears well-developed and well-nourished. No distress.   HENT:   Head: Normocephalic and atraumatic.   Nose: Nose normal.   Mouth/Throat: Oropharynx is clear and moist.   Eyes: Pupils are equal, round, and reactive to light. Conjunctivae and EOM are normal.   Neck: Normal range of motion. No tracheal deviation present.   Pulmonary/Chest: Effort normal and breath sounds normal. No respiratory distress.   Abdominal: Soft. Bowel sounds are normal. She exhibits no distension.   Midline incision superior and below umbilicus erythema and  purulent drainage.  Wound opened up superiorly in office and got copious amounts of purulence out.  Packed with Betadine 10 inch Kerlix   Musculoskeletal: Normal range of motion. She exhibits no edema or deformity.   Neurological: She is alert and oriented to person, place, and time. No cranial nerve deficit. Coordination and gait normal.   Skin: Skin is warm and dry.   Psychiatric: She has a normal mood and affect. Her behavior is normal. Judgment normal.       Review of Medical Record:  I reviewed CT scan images and report and reviewed with radiology    Assessment:  1. Abdominopelvic abscess (CMS/HCC)        Plan: Admit, IV antibiotics, interventional radiology to drain in her abdominal abscess.  While in the hospital will get wound VAC placed.    Electronically signed by Padmaja Ye MD at 08/12/20 1709       Operative/Procedure Notes (last 48 hours) (Notes from 08/11/20 1505 through 08/13/20 1505)    No notes of this type exist for this encounter.         Physician Progress Notes (last 48 hours) (Notes from 08/11/20 1506 through 08/13/20 1506)    No notes of this type exist for this encounter.       Consult Notes (last 48 hours) (Notes from 08/11/20 1506 through 08/13/20 1506)    No notes of this type exist for this encounter.       All medication doses during the admission are shown, including meds that are no longer on order.   Scheduled Meds Sorted by Name   for Carole Weaver as of 8/11/20 through 8/13/20     1 Day 3 Days 7 Days 10 Days < Today >    Legend:                          Inactive     Active     Other Encounter    Linked               Medications 08/11/20 08/12/20 08/13/20   acetaminophen (OFIRMEV) injection 1,000 mg   Dose: 1,000 mg  Freq: Once Route: IV  Start: 08/03/20 1219 End: 08/03/20 1241    Order specific questions:   Drug Name: tylenol  Reason for Non-Formulary npo npr         acetaminophen (TYLENOL) tablet 1,000 mg   Dose: 1,000 mg  Freq: Every 6 Hours Route: PO  Start: 08/03/20 1219  End: 08/07/20 1813         acetaminophen (TYLENOL) tablet 1,000 mg   Dose: 1,000 mg  Freq: Every 6 Hours Route: PO  Start: 08/03/20 0546 End: 08/03/20 1210         alvimopan (ENTEREG) capsule 12 mg   Dose: 12 mg  Freq: 2 Times Daily Route: PO  Start: 08/04/20 0900 End: 08/07/20 0759    Admin Instructions:   Start the morning after surgery. Discontinue Entereg once patient has first bowel movement. Discontinue Entereg once opiods have been discontinued.Discontinue at discharge. For Inpatient use only. Maximum 15 doses (pre and post-op).  Inpatient use only, max 15 total doses (pre and post op).  Discontinue after first BM or when narcotics have been discontinued.    Order specific questions:   Is this medication for a partial large or small bowel resection with primary anastomosis? Yes  Has the patient recieved daily opioids for the past 7 days? No         alvimopan (ENTEREG) capsule 12 mg   Dose: 12 mg  Freq: Once Route: PO  Start: 08/03/20 0546 End: 08/03/20 0647    Admin Instructions:   Contraindicated in end stage renal failure or severe hepatic impairment.  Administer dose 30 minutes to 5 hours prior to surgery.  Inpatient use only, max 15 total doses (pre and post op).  Discontinue after first BM or when narcotics have been discontinued.    Order specific questions:   Is this medication for a partial large or small bowel resection with primary anastomosis? Yes  Has the patient recieved daily opioids for the past 7 days? No         bupivacaine (PF) (MARCAINE) 0.25 % injection   Route: IJ  Start: 08/03/20 0736 End: 08/03/20 0736         bupivacaine liposome (EXPAREL) 1.3 % injection   Route: INFILTRATION  Start: 08/03/20 0736 End: 08/03/20 0736         celecoxib (CeleBREX) capsule 200 mg   Dose: 200 mg  Freq: Every 12 Hours Scheduled Route: PO  Indications Comment: Post-op Pain  Start: 08/07/20 1400 End: 08/07/20 1813    Admin Instructions:   Take with food if GI upset occurs.  If given for pain, use the  following pain scale:  Mild Pain = Pain Score of 1-3, CPOT 1-2  Moderate Pain = Pain Score of 4-6, CPOT 3-4  Severe Pain = Pain Score of 7-10, CPOT 5-8         celecoxib (CeleBREX) capsule 200 mg   Dose: 200 mg  Freq: Every 12 Hours Scheduled Route: PO  Indications Comment: Post-op Pain  Start: 08/03/20 2000 End: 08/07/20 0932    Admin Instructions:   Take with food if GI upset occurs.  If given for pain, use the following pain scale:  Mild Pain = Pain Score of 1-3, CPOT 1-2  Moderate Pain = Pain Score of 4-6, CPOT 3-4  Severe Pain = Pain Score of 7-10, CPOT 5-8         celecoxib (CeleBREX) capsule 200 mg   Dose: 200 mg  Freq: Once Route: PO  Start: 08/03/20 0617 End: 08/03/20 0654    Admin Instructions:   Take with food if GI upset occurs.  If given for pain, use the following pain scale:  Mild Pain = Pain Score of 1-3, CPOT 1-2  Moderate Pain = Pain Score of 4-6, CPOT 3-4  Severe Pain = Pain Score of 7-10, CPOT 5-8         celecoxib (CeleBREX) capsule 200 mg   Dose: 200 mg  Freq: Every 12 Hours Scheduled Route: PO  Start: 08/03/20 0900 End: 08/03/20 0615    Admin Instructions:   Take with food if GI upset occurs.  If given for pain, use the following pain scale:  Mild Pain = Pain Score of 1-3, CPOT 1-2  Moderate Pain = Pain Score of 4-6, CPOT 3-4  Severe Pain = Pain Score of 7-10, CPOT 5-8         cetirizine (zyrTEC) tablet 10 mg   Dose: 10 mg  Freq: Daily Route: PO  Start: 08/03/20 1530 End: 08/07/20 1813         diatrizoate meglumine-sodium (GASTROGRAFIN) 66-10 % solution 30 mL   Dose: 30 mL  Freq: Once in Imaging Route: PO  Start: 08/12/20 1158 End: 08/12/20 1130     1130             ertapenem (INVanz) 1 g/100 mL 0.9% NS VTB (mbp)   Dose: 1 g  Freq: Once Route: IV  Indications of Use: PERIOPERATIVE PHARMACOPROPHYLAXIS  Start: 08/03/20 0546 End: 08/03/20 0738    Admin Instructions:   Caution: Look alike/sound alike drug alert.         escitalopram (LEXAPRO) tablet 20 mg   Dose: 20 mg  Freq: Daily Route:  PO  Start: 08/12/20 1800    Admin Instructions:   Caution: Look alike/sound alike drug alert.     1849          0810            escitalopram (LEXAPRO) tablet 20 mg   Dose: 20 mg  Freq: Every Evening Route: PO  Start: 08/03/20 1700 End: 08/07/20 1813    Admin Instructions:   Caution: Look alike/sound alike drug alert.         famotidine (PEPCID) injection 20 mg   Dose: 20 mg  Freq: Every 12 Hours Scheduled Route: IV  Start: 08/06/20 0045 End: 08/07/20 1813    Admin Instructions:   Dilute to 10 mL total volume and give IV push over 2 minutes.         famotidine (PEPCID) injection 20 mg   Dose: 20 mg  Freq: Once Route: IV  Start: 08/03/20 0622 End: 08/03/20 0654    Admin Instructions:   Dilute to 10 mL total volume and give IV push over 2 minutes.         famotidine (PEPCID) tablet 20 mg   Dose: 20 mg  Freq: 2 Times Daily Route: PO  Start: 08/12/20 2100     2032          0810   2100          gabapentin (NEURONTIN) capsule 600 mg   Dose: 600 mg  Freq: Every 12 Hours Scheduled Route: PO  Start: 08/07/20 1400 End: 08/07/20 1813    Admin Instructions:   For patients less than 65 years of age.           gabapentin (NEURONTIN) capsule 600 mg   Dose: 600 mg  Freq: Every 12 Hours Scheduled Route: PO  Start: 08/03/20 2000 End: 08/07/20 0932    Admin Instructions:   For patients less than 65 years of age.           gabapentin (NEURONTIN) capsule 600 mg   Dose: 600 mg  Freq: Once Route: PO  Start: 08/03/20 0617 End: 08/03/20 0648    Admin Instructions:            gabapentin (NEURONTIN) capsule 600 mg   Dose: 600 mg  Freq: 3 Times Daily Route: PO  Start: 08/03/20 0900 End: 08/03/20 0615    Admin Instructions:            heparin (porcine) 5000 UNIT/ML injection 5,000 Units   Dose: 5,000 Units  Freq: Every 8 Hours Scheduled Route: SC  Indications Comment: Prophylaxis of Venous Thromboembolism  Start: 08/03/20 2000 End: 08/05/20 1231         iopamidol (ISOVUE-300) 61 % injection 100 mL   Dose: 100 mL  Freq: Once in Imaging Route:  IV  Start: 08/12/20 1158 End: 08/12/20 1223     1223             lactated ringers bolus 500 mL   Dose: 500 mL  Freq: Once Route: IV  Last Dose: 500 mL (08/04/20 0958)  Start: 08/04/20 1030 End: 08/04/20 1058         lidocaine (XYLOCAINE) 1 % injection 20 mL   Dose: 20 mL  Freq: Once Route: INFILTRATION  Start: 08/13/20 1045 End: 08/13/20 1045      1045 [C]            Methocarbamol (ROBAXIN) injection 500 mg   Dose: 500 mg  Freq: Every 8 Hours Route: IV  Start: 08/04/20 1600 End: 08/07/20 1315    Admin Instructions:   1. For IV Admin: Give while recumbent maintain position for 15-30 min. Give slow IV push over 5 min not to exceed 3mL/min 2. For IM Admin: a max of 5mL can be administered into each gluteal region.         methocarbamol (ROBAXIN) tablet 500 mg   Dose: 500 mg  Freq: Every 8 Hours Scheduled Route: PO  Start: 08/12/20 1930     1849          0503   1335   2200        methocarbamol (ROBAXIN) tablet 500 mg   Dose: 500 mg  Freq: Every 8 Hours Scheduled Route: PO  Start: 08/12/20 2200 End: 08/12/20 1841     1841-D/C'd           methocarbamol (ROBAXIN) tablet 500 mg   Dose: 500 mg  Freq: Every 8 Hours Scheduled Route: PO  Start: 08/07/20 1500 End: 08/07/20 1813         piperacillin-tazobactam (ZOSYN) 3.375 g in iso-osmotic dextrose 50 ml (premix)   Dose: 3.375 g  Freq: Every 8 Hours Route: IV  Indications of Use: INTRA-ABDOMINAL INFECTION  Start: 08/12/20 2345 End: 08/22/20 2344    Admin Instructions:   Refrigerate     2345          0810   1545   2345        piperacillin-tazobactam (ZOSYN) 3.375 g in iso-osmotic dextrose 50 ml (premix)   Dose: 3.375 g  Freq: Once Route: IV  Last Dose: 3.375 g (08/12/20 1731)  Start: 08/12/20 1745 End: 08/12/20 1801    Admin Instructions:   Refrigerate     1731             rivaroxaban (XARELTO) tablet 20 mg   Dose: 20 mg  Freq: Daily Route: PO  Indications of Use: DVT/PE (active thrombosis)  Start: 08/07/20 1400 End: 08/07/20 1813    Admin Instructions:   If giving by tube:  suspend in 50mL water, administer, and immediately follow with enteral feeding.  Give with food.         Scopolamine (TRANSDERM-SCOP) 1.5 MG/3DAYS patch 1 patch   Dose: 1 patch  Freq: Every 72 Hours Route: TD  Start: 08/03/20 0627 End: 08/07/20 1813    Admin Instructions:            sodium chloride 0.9 % flush 10 mL   Dose: 10 mL  Freq: Every 12 Hours Scheduled Route: IV  Start: 08/12/20 2100     2033          0810   2100          sodium chloride 0.9 % flush 10 mL   Dose: 10 mL  Freq: Every 12 Hours Scheduled Route: IV  Start: 08/07/20 1415 End: 08/07/20 1813    Admin Instructions:   If Port Accessed But Not In Use         sodium chloride 0.9 % flush 3 mL   Dose: 3 mL  Freq: Every 12 Hours Scheduled Route: IV  Start: 08/03/20 0900 End: 08/03/20 1210         Medications 08/11/20 08/12/20 08/13/20       Continuous Meds Sorted by Name   for Carole Weaver as of 8/11/20 through 8/13/20    Legend:                          Inactive     Active     Other Encounter    Linked               Medications 08/11/20 08/12/20 08/13/20   dextrose 5 % and sodium chloride 0.45 % with KCl 20 mEq/L infusion   Rate: 40 mL/hr Dose: 40 mL/hr  Freq: Continuous Route: IV  Last Dose: 40 mL/hr (08/06/20 2051)  Start: 08/05/20 1330 End: 08/07/20 1313         heparin 54184 units/250 mL (100 units/mL) in 0.45 % NaCl infusion   Rate: 14.97 mL/hr Dose: 14.4 Units/kg/hr  Weight Dosing Info: 104 kg  Freq: Titrated Route: IV  Indications of Use: Post Surgical - Other  Last Dose: Stopped (08/07/20 1345)  Start: 08/05/20 1330 End: 08/07/20 1313    Admin Instructions:   Weight Based Heparin Nomogram: VTE / PE: Initial Heparin Infusion 18 units/kg/hr    aPTT Less Than 60: Increase Dose By 4 units/kg/hr.  aPTT 60-70: Increase Dose By 2 units/kg/hr.  aPTT 71-95: No Dose Change  aPTT : Decrease Dose By 2 units/kg/hr.  aPTT Greater Than 115: Hold Infusion For 1 hour.  Decrease Dose By 3 units/kg/hr. Repeat aPTT 6 Hours After Restarted.  If aPTT Remains  Greater Than 115 Stop Heparin Drip & Contact Provider.  RN to release aPTT order 6 hours after heparin bolus 6 hours after any heparin rate change         lactated ringers infusion   Rate: 50 mL/hr Dose: 50 mL/hr  Freq: Continuous Route: IV  Last Dose: 50 mL/hr (08/05/20 0819)  Start: 08/03/20 1530 End: 08/05/20 1231         lactated ringers infusion   Freq: Continuous PRN  Start: 08/03/20 0700 End: 08/03/20 1207         lactated ringers infusion   Rate: 9 mL/hr Dose: 9 mL/hr  Freq: Continuous Route: IV  Last Dose: 9 mL/hr (08/03/20 0646)  Start: 08/03/20 0622 End: 08/03/20 1430         lidocaine 4 mg/mL in dextrose 5% infusion   Freq: Continuous PRN Route: IV  Last Dose: Stopped (08/03/20 1151)  Start: 08/03/20 0740 End: 08/03/20 1207         Pharmacy to Dose Zosyn   Freq: Continuous PRN Route: XX  PRN Reason: Consult  Indications of Use: INTRA-ABDOMINAL INFECTION  Start: 08/12/20 1636 End: 08/12/20 1653     1653-D/C'd           sodium chloride 0.9 % infusion   Rate: 50 mL/hr Dose: 50 mL/hr  Freq: Continuous Route: IV  Last Dose: 50 mL/hr (08/13/20 1202)  Start: 08/12/20 1730     1731          0810   1202          Medications 08/11/20 08/12/20 08/13/20       PRN Meds Sorted by Name   for Carole Weaver as of 8/11/20 through 8/13/20    Legend:                          Inactive     Active     Other Encounter    Linked               Medications 08/11/20 08/12/20 08/13/20   acetaminophen (TYLENOL) tablet 650 mg   Dose: 650 mg  Freq: Once As Needed Route: PO  PRN Reason: Mild Pain   Start: 08/03/20 1159 End: 08/03/20 1414         Or  acetaminophen (TYLENOL) suppository 650 mg   Dose: 650 mg  Freq: Once As Needed Route: RE  PRN Reason: Mild Pain   Start: 08/03/20 1159 End: 08/03/20 1414         bupivacaine-EPINEPHrine (MARCAINE w/EPI) injection   Freq: As Needed  Start: 08/03/20 0812 End: 08/03/20 1207         dexamethasone (DECADRON) injection   Freq: As Needed Route: IV  Start: 08/03/20 0742 End: 08/03/20 1207          diphenhydrAMINE (BENADRYL) capsule 25 mg   Dose: 25 mg  Freq: Every 30 Minutes PRN Route: PO  PRN Reason: Itching  PRN Comment: May repeat x 1  Indications of Use: EXTRAPYRAMIDAL REACTION,PRURITUS  Start: 08/03/20 1159 End: 08/03/20 1414    Admin Instructions:   Caution: Look alike/sound alike drug alert. This med may be ordered in other forms and routes. Before giving verify the last time the drug was given by any route/form.         diphenhydrAMINE (BENADRYL) injection 12.5 mg   Dose: 12.5 mg  Freq: Every 15 Minutes PRN Route: IV  PRN Reason: Itching  PRN Comment: May repeat x 1  Start: 08/03/20 1159 End: 08/03/20 1414    Admin Instructions:   Caution: Look alike/sound alike drug alert. This med may be ordered in other forms and routes. Before giving verify the last time the drug was given by any route/form.         ePHEDrine injection 5 mg   Dose: 5 mg  Freq: Once As Needed Route: IV  PRN Comment: symptomatic hypotension - Notify attending anesthesiologist if this needs to be given  Start: 08/03/20 1159 End: 08/03/20 1414    Admin Instructions:   Caution: Look alike/sound alike drug alert   Dilute with NS to 5-10 mg/mL.  Central line preferred, if unavailable use large bore IV access with frequent nurse monitoring of IV site.         fentaNYL citrate (PF) (SUBLIMAZE) injection 50 mcg   Dose: 50 mcg  Freq: Every 5 Minutes PRN Route: IV  PRN Reasons: Moderate Pain ,Severe Pain   Start: 08/03/20 1159 End: 08/03/20 1414    Admin Instructions:   May alternate fentanyl with hydromorphone using fentanyl first.    Maximum total dose of fentanyl is 200 mcg.  If given for pain, use the following pain scale:  Mild Pain = Pain Score of 1-3, CPOT 1-2  Moderate Pain = Pain Score of 4-6, CPOT 3-4  Severe Pain = Pain Score of 7-10, CPOT 5-8         fentaNYL citrate (PF) (SUBLIMAZE) injection 50 mcg   Dose: 50 mcg  Freq: Every 10 Minutes PRN Route: IV  PRN Reason: Severe Pain   Start: 08/03/20 0620 End: 08/03/20 1210    Admin  Instructions:   Maximum total dose of fentanyl is 100 mcg.  If given for pain, use the following pain scale:  Mild Pain = Pain Score of 1-3, CPOT 1-2  Moderate Pain = Pain Score of 4-6, CPOT 3-4  Severe Pain = Pain Score of 7-10, CPOT 5-8         flumazenil (ROMAZICON) injection 0.2 mg   Dose: 0.2 mg  Freq: As Needed Route: IV  PRN Comment: for benzodiazepine induced unresponsiveness or sedation  Indications of Use: BENZODIAZEPINE-INDUCED SEDATION  Start: 08/03/20 1159 End: 08/03/20 1414    Admin Instructions:   Notify Anesthesia if given  ** give IV over 15-30 seconds **         heparin injection 500 Units   Dose: 5 mL  Freq: As Needed Route: IV  PRN Reason: Line Care  PRN Comment: Prior to De-Accessing Port  Start: 08/07/20 1324 End: 08/07/20 1813         hydrALAZINE (APRESOLINE) injection   Freq: As Needed  Start: 08/03/20 0839 End: 08/03/20 1207         hydrALAZINE (APRESOLINE) injection 10 mg   Dose: 10 mg  Freq: Every 6 Hours PRN Route: IV  PRN Reason: High Blood Pressure  Start: 08/04/20 1113 End: 08/07/20 1813    Admin Instructions:   As needed for SBP greater than 160  Caution: Look alike/sound alike drug alert         hydrALAZINE (APRESOLINE) injection 5 mg   Dose: 5 mg  Freq: Every 10 Minutes PRN Route: IV  PRN Reason: High Blood Pressure  PRN Comment: for systolic blood pressure greater than 180 mmHg or diastolic blood pressure greater than 105 mmHg  Start: 08/03/20 1159 End: 08/03/20 1414    Admin Instructions:   Use labetalol first THEN hydralazine second if labetalol fails to reduce systolic blood pressure to less than 180 mmHg or diastolic blood pressure less than 105 mmHg    Maximum dose of hydralazine is 20 mg  Caution: Look alike/sound alike drug alert         HYDROcodone-acetaminophen (NORCO) 5-325 MG per tablet 1 tablet   Dose: 1 tablet  Freq: Every 6 Hours PRN Route: PO  PRN Reason: Moderate Pain   Start: 08/12/20 1634 End: 08/22/20 1633    Admin Instructions:   [LALITA]    Do not exceed 4 grams  of acetaminophen in a 24 hr period.    If given for pain, use the following pain scale:   Mild Pain = Pain Score of 1-3, CPOT 1-2  Moderate Pain = Pain Score of 4-6, CPOT 3-4  Severe Pain = Pain Score of 7-10, CPOT 5-8                       Or  HYDROcodone-acetaminophen (NORCO) 5-325 MG per tablet 2 tablet   Dose: 2 tablet  Freq: Every 6 Hours PRN Route: PO  PRN Reason: Moderate Pain   Start: 08/12/20 1634 End: 08/22/20 1633    Admin Instructions:   [LALITA]    Do not exceed 4 grams of acetaminophen in a 24 hr period.    If given for pain, use the following pain scale:   Mild Pain = Pain Score of 1-3, CPOT 1-2  Moderate Pain = Pain Score of 4-6, CPOT 3-4  Severe Pain = Pain Score of 7-10, CPOT 5-8     1718          0434            HYDROcodone-acetaminophen (NORCO) 7.5-325 MG per tablet 1 tablet   Dose: 1 tablet  Freq: Once As Needed Route: PO  PRN Reason: Mild Pain   Start: 08/03/20 1159 End: 08/03/20 1414         HYDROmorphone (DILAUDID) injection 0.25 mg   Dose: 0.25 mg  Freq: Every 3 Hours PRN Route: IV  PRN Reason: Severe Pain   Start: 08/03/20 1430 End: 08/07/20 1813    Admin Instructions:   If given for pain, use the following pain scale:  Mild Pain = Pain Score of 1-3, CPOT 1-2  Moderate Pain = Pain Score of 4-6, CPOT 3-4  Severe Pain = Pain Score of 7-10, CPOT 5-8         And  naloxone (NARCAN) injection 0.4 mg   Dose: 0.4 mg  Freq: Every 5 Minutes PRN Route: IV  PRN Reason: Respiratory Depression  Start: 08/03/20 1430 End: 08/07/20 1813    Admin Instructions:   If respiratory rate is less than 8 breaths/minute or patient is difficult to arouse stop any narcotics and contact physician.   Administer slow IV push. Repeat as ordered until patient's respiratory rate is greater than 12 breaths/minute.         HYDROmorphone (DILAUDID) injection 0.5 mg   Dose: 0.5 mg  Freq: Every 5 Minutes PRN Route: IV  PRN Reasons: Moderate Pain ,Severe Pain   Start: 08/03/20 1159 End: 08/03/20 1414    Admin Instructions:   May  alternate fentanyl with hydromorphone using fentanyl first.    Maximum total dose of hydromorphone is 2 mg.  If given for pain, use the following pain scale:  Mild Pain = Pain Score of 1-3, CPOT 1-2  Moderate Pain = Pain Score of 4-6, CPOT 3-4  Severe Pain = Pain Score of 7-10, CPOT 5-8         ketamine (KETALAR) injection   Freq: As Needed Route: IV  Start: 08/03/20 0737 End: 08/03/20 1207         labetalol (NORMODYNE,TRANDATE) injection   Freq: As Needed  Start: 08/03/20 0911 End: 08/03/20 1207         lactated ringers infusion   Freq: Continuous PRN  Start: 08/03/20 0700 End: 08/03/20 1207         lidocaine (LTA KIT) 4 % laryngotracheal solution   Freq: As Needed  Start: 08/03/20 0735 End: 08/03/20 1207         lidocaine (XYLOCAINE) 2% injection   Freq: As Needed Route: IJ  Start: 08/03/20 0733 End: 08/03/20 1207         lidocaine 4 mg/mL in dextrose 5% infusion   Freq: Continuous PRN Route: IV  Start: 08/03/20 0740 End: 08/03/20 1207         lidocaine PF 1% (XYLOCAINE) injection 0.5 mL   Dose: 0.5 mL  Freq: Once As Needed Route: IJ  PRN Comment: IV Start  Start: 08/03/20 0620 End: 08/03/20 1210         LORazepam (ATIVAN) tablet 0.5 mg   Dose: 0.5 mg  Freq: Every 8 Hours PRN Route: PO  PRN Reason: Anxiety  PRN Comment: muscle spasms  Start: 08/03/20 1430 End: 08/07/20 1813    Admin Instructions:    Caution: Look alike/sound alike drug alert         magnesium sulfate injection   Freq: As Needed Route: IV  Start: 08/03/20 0737 End: 08/03/20 1207         metoprolol tartrate (LOPRESSOR) injection   Freq: As Needed  Start: 08/03/20 0828 End: 08/03/20 1207         midazolam (VERSED) injection 1 mg   Dose: 1 mg  Freq: Every 10 Minutes PRN Route: IV  PRN Reason: Anxiety  Indications of Use: ANXIETY  Start: 08/03/20 0620 End: 08/03/20 1210    Admin Instructions:   May repeat dose in 10 minutes x 1 then contact provider for additional orders.           naloxone (NARCAN) injection 0.2 mg   Dose: 0.2 mg  Freq: As Needed  Route: IV  PRN Reasons: Opioid Reversal,Respiratory Depression  PRN Comment: unresponsiveness, decrease oxygen saturation  Indications of Use: ACUTE RESPIRATORY FAILURE,OPIOID-INDUCED RESPIRATORY DEPRESSION  Start: 08/03/20 1159 End: 08/03/20 1414    Admin Instructions:   Notify Anesthesia if given         nitroglycerin (NITROSTAT) SL tablet 0.4 mg   Dose: 0.4 mg  Freq: Every 5 Minutes PRN Route: SL  PRN Reason: Chest Pain  PRN Comment: if systolic BP greater than 100 mm/Hg.  Start: 08/03/20 1430 End: 08/07/20 1813    Admin Instructions:   If pain unrelieved after 3 doses, notify MD.         ondansetron (ZOFRAN) injection   Freq: As Needed Route: IV  Start: 08/03/20 1143 End: 08/03/20 1207         ondansetron (ZOFRAN) tablet 4 mg   Dose: 4 mg  Freq: Every 6 Hours PRN Route: PO  PRN Reasons: Nausea,Vomiting  Start: 08/12/20 1636    Admin Instructions:   If BOTH ondansetron (ZOFRAN) and promethazine (PHENERGAN) are ordered use ondansetron first and THEN promethazine IF ondansetron is ineffective.         Or  ondansetron (ZOFRAN) injection 4 mg   Dose: 4 mg  Freq: Every 6 Hours PRN Route: IV  PRN Reasons: Nausea,Vomiting  Start: 08/12/20 1636    Admin Instructions:   If BOTH ondansetron (ZOFRAN) and promethazine (PHENERGAN) are ordered use ondansetron first and THEN promethazine IF ondansetron is ineffective.         ondansetron (ZOFRAN) injection 4 mg   Dose: 4 mg  Freq: Every 6 Hours PRN Route: IV  PRN Reasons: Nausea,Vomiting  Start: 08/03/20 1430 End: 08/07/20 1813         ondansetron (ZOFRAN) injection 4 mg   Dose: 4 mg  Freq: Once As Needed Route: IV  PRN Reasons: Nausea,Vomiting  Indications of Use: POSTOPERATIVE NAUSEA AND VOMITING  Start: 08/03/20 1159 End: 08/03/20 1414    Admin Instructions:   If BOTH ondansetron (ZOFRAN) and promethazine (PHENERGAN) are ordered use ondansetron first and THEN promethazine IF ondansetron is ineffective.         oxyCODONE-acetaminophen (PERCOCET) 7.5-325 MG per tablet 1 tablet    Dose: 1 tablet  Freq: Once As Needed Route: PO  PRN Reason: Moderate Pain   Start: 08/03/20 1159 End: 08/03/20 1414         Pharmacy to Dose Zosyn   Freq: Continuous PRN Route: XX  PRN Reason: Consult  Indications of Use: INTRA-ABDOMINAL INFECTION  Start: 08/12/20 1636 End: 08/12/20 1653     1653-D/C'd           Propofol (DIPRIVAN) injection   Freq: As Needed Route: IV  Start: 08/03/20 0733 End: 08/03/20 1207         rocuronium (ZEMURON) injection   Freq: As Needed Route: IV  Start: 08/03/20 0733 End: 08/03/20 1207         sodium chloride (NS) irrigation solution   Freq: As Needed  Start: 08/03/20 0812 End: 08/03/20 1207         sodium chloride 0.9 % flush 10 mL   Dose: 10 mL  Freq: As Needed Route: IV  PRN Reason: Line Care  Start: 08/12/20 1631         sodium chloride 0.9 % flush 10 mL   Dose: 10 mL  Freq: As Needed Route: IV  PRN Reason: Line Care  PRN Comment: After Medication Administration & Prior to De-Accessing Port  Start: 08/07/20 1324 End: 08/07/20 1813         sodium chloride 0.9 % flush 20 mL   Dose: 20 mL  Freq: As Needed Route: IV  PRN Reason: Line Care  PRN Comment: After Blood Draws or Blood Product Administration  Start: 08/07/20 1324 End: 08/07/20 1813         sodium chloride 0.9 % flush 3-10 mL   Dose: 3-10 mL  Freq: As Needed Route: IV  PRN Reason: Line Care  Start: 08/03/20 0620 End: 08/03/20 1210         sugammadex (BRIDION) injection   Freq: As Needed  Start: 08/03/20 1148 End: 08/03/20 1207         Medications 08/11/20 08/12/20 08/13/20

## 2020-08-13 NOTE — OUTREACH NOTE
General Surgery Week 2 Survey      Responses   [facility] patient discharged from?  [facility] [place]   Does the patient have one of the following disease processes/diagnoses(primary or secondary)?  General Surgery   Week 2 attempt successful?  No   Revoke  Readmitted          Jania May RN

## 2020-08-13 NOTE — PROGRESS NOTES
Continued Stay Note  [facility]     Patient Name: Carole Weaver  MRN: [id number]  Today's Date: 8/13/2020    Admit Date: 8/12/2020    Discharge Plan     Row Name 08/13/20 1524       Plan    Plan  Home with family and [facility] . Home wound vac supplied through [facility]. Family to transport.    Patient/Family in Agreement with Plan  yes    Plan Comments  Met with pt. at bedside. Explained roll of . Face sheet and pharmacy verified. Pt lives with her spouse and dependent child. There is one step to enter home.  DME equipment includes ostomy supplies.  Pt is independent with ADLs. Pt has never been to Rehab. Pt has used [facility] in the past and wants to have them see her at D/C for wound vac. Pt's PCP is Dr Vela. Uses [facility] on [facility]. At discharge, family will transport. Called [facility] to obtain wound vac for D/C. Wound vac to be delivered tomorrow pending insurance approval. Called [facility] and informed of referral. Stated they would be able to accept. Explained that CCP would follow to assess for discharge needs.  Lebron Muniz RN-BC        Discharge Codes    No documentation.             Lebron Muniz, RN

## 2020-08-13 NOTE — PLAN OF CARE
Problem: Patient Care Overview  Goal: Plan of Care Review  Outcome: Ongoing (interventions implemented as appropriate)  Flowsheets (Taken 8/13/2020 2146)  Progress: no change  Plan of Care Reviewed With: patient    Note:   Vitals stable. CT guided drain of an abscess done today. Purulent drainage coming from rectum - Dr Ye assessed, Incision And Drainage Of Perirectal Abscess planned for tomorrow. Clear liquids after midnight - NPO after 0730. IV fluids and antibiotics continued. Wound vac placed. Will continue to monitor.       Goal: Individualization and Mutuality  Outcome: Ongoing (interventions implemented as appropriate)  Goal: Discharge Needs Assessment  Outcome: Ongoing (interventions implemented as appropriate)  Goal: Interprofessional Rounds/Family Conf  Outcome: Ongoing (interventions implemented as appropriate)     Problem: Pain, Acute (Adult)  Goal: Identify Related Risk Factors and Signs and Symptoms  Outcome: Ongoing (interventions implemented as appropriate)  Goal: Acceptable Pain Control/Comfort Level  Outcome: Ongoing (interventions implemented as appropriate)     Problem: Infection, Risk/Actual (Adult)  Goal: Identify Related Risk Factors and Signs and Symptoms  Outcome: Ongoing (interventions implemented as appropriate)  Goal: Infection Prevention/Resolution  Outcome: Ongoing (interventions implemented as appropriate)

## 2020-08-13 NOTE — PROGRESS NOTES
Progress Note    Hd 2      S: positive flatus,  positive BM. positive ambulating. Pain controlled with tylenol  Drainage from rectum - purulent  O:  Temp:  [97.7 °F (36.5 °C)-98.7 °F (37.1 °C)] 98.7 °F (37.1 °C)  Heart Rate:  [] 88  Resp:  [16-20] 20  BP: (105-146)/(73-98) 146/98      Intake/Output Summary (Last 24 hours) at 8/13/2020 1553  Last data filed at 8/13/2020 1337  Gross per 24 hour   Intake 1600 ml   Output 775 ml   Net 825 ml       Abd:   soft, non distended  Incision: clean, dry      Results from last 7 days   Lab Units 08/12/20  1758   WBC 10*3/mm3 12.38*   HEMOGLOBIN g/dL 10.7*   HEMATOCRIT % 32.5*   PLATELETS 10*3/mm3 402     Results from last 7 days   Lab Units 08/12/20  1758   SODIUM mmol/L 132*   POTASSIUM mmol/L 3.5   CHLORIDE mmol/L 97*   CO2 mmol/L 24.7   BUN mg/dL 5*   CREATININE mg/dL 0.59   GLUCOSE mg/dL 91   CALCIUM mg/dL 8.4*               A/P: 40 y.o. female with s/p ir drain today - unable to get to rectal fluid  Wound vac placed and ordered  abx going  Gram stain no organism from around ileostomy  Plan to drain perirectal fluid in or so rectum can scar in and heal

## 2020-08-13 NOTE — NURSING NOTE
08/13/20 1145   Wound 08/03/20 0816 abdomen Incision   Date first assessed/Time first assessed: 08/03/20 0816   Location: abdomen  Primary Wound Type: Incision   Dressing Appearance intact;moist drainage   Base clean;moist;red   Periwound intact   Edges open   Wound Length (cm) 5 cm   Wound Width (cm) 3 cm   Wound Depth (cm) 7 cm  (2nd wound: 7x5x5.5 cm)   Drainage Characteristics/Odor tan   Drainage Amount moderate   Care, Wound cleansed with;irrigated with;sterile normal saline;negative pressure wound therapy   Dressing Care, Wound dressing changed;foam;transparent film   Periwound Care, Wound barrier film applied;dry periwound area maintained   NPWT (Negative Pressure Wound Therapy) 08/13/20 1130 midline abdomen/2 wounds   Placement Date/Time: 08/13/20 1130   Location: midline abdomen/2 wounds  Additional Comments: open surgical wound   Therapy Setting continuous therapy   Dressing foam, black   Pressure Setting 125 mmHg   Sponges Inserted 2   Ileostomy RUQ   Placement Date/Time: 03/01/20 0542   Placed by External Staff?: (c)   Hand Hygiene Completed: Yes  Location: RUQ   Stomal Appliance 2 piece;Changed   Stoma Appearance round;moist;red   Peristomal Assessment Intact   Accessories/Skin Care cleansed with water;skin barrier ring   Stoma Function stool   Stool Color brown   Stool Consistency loose   Treatment Bag change   Output (mL) 25 mL   CWOCN for wound vac placement to two wounds midline abdomen.  Patient tolerated well.    Ostomy pouch changed today; supplies in room.

## 2020-08-13 NOTE — DISCHARGE PLACEMENT REQUEST
"Carole Weaver (40 y.o. Female)     Date of Birth Social Security Number Address Home Phone MRN    1980  [id number] [place] [id number] [phone] [id number]    Judaism Marital Status          Jehovah's witness        Admission Date Admission Type Admitting Provider Attending Provider Department, Room/Bed    8/12/20 Elective Padmaja Ye MD Allen, Nechol L, MD [address], [address]    Discharge Date Discharge Disposition Discharge Destination                       Attending Provider:  Padmaja Ye MD    Allergies:  No Known Allergies    Isolation:  None   Infection:  None   Code Status:  CPR    Ht:  167.6 cm (66\")   Wt:  99.3 kg (219 lb)    Admission Cmt:  None   Principal Problem:  None                Active Insurance as of 8/12/2020     Primary Coverage     Payor Plan Insurance Group Employer/Plan Group    ANTHEM BLUE CROSS ANTHEM BLUE CROSS BLUE SHIELD PPO [id number]     Payor Plan Address Payor Plan Phone Number Payor Plan Fax Number Effective Dates    [address] [phone]  1/1/2018 - None Entered    [facility] [id number]       Subscriber Name Subscriber Birth Date Member ID       JUSTUS WEAVER 1980 [id number]                 Emergency Contacts      (Rel.) Home Phone Work Phone Mobile Phone    Justus Weaver (Spouse) [phone] -- --    DENIS CRONIN (Mother) [phone] -- [phone]              "

## 2020-08-14 ENCOUNTER — ANESTHESIA EVENT (OUTPATIENT)
Dept: PERIOP | Facility: HOSPITAL | Age: 40
End: 2020-08-14

## 2020-08-14 ENCOUNTER — ANESTHESIA (OUTPATIENT)
Dept: PERIOP | Facility: HOSPITAL | Age: 40
End: 2020-08-14

## 2020-08-14 ENCOUNTER — READMISSION MANAGEMENT (OUTPATIENT)
Dept: CALL CENTER | Facility: HOSPITAL | Age: 40
End: 2020-08-14

## 2020-08-14 VITALS
RESPIRATION RATE: 16 BRPM | HEIGHT: 66 IN | OXYGEN SATURATION: 95 % | SYSTOLIC BLOOD PRESSURE: 141 MMHG | TEMPERATURE: 97.7 F | DIASTOLIC BLOOD PRESSURE: 89 MMHG | HEART RATE: 73 BPM | BODY MASS INDEX: 35.2 KG/M2 | WEIGHT: 219 LBS

## 2020-08-14 PROCEDURE — 25010000002 PIPERACILLIN SOD-TAZOBACTAM PER 1 G: Performed by: COLON & RECTAL SURGERY

## 2020-08-14 PROCEDURE — 87205 SMEAR GRAM STAIN: CPT | Performed by: COLON & RECTAL SURGERY

## 2020-08-14 PROCEDURE — 25010000002 PROPOFOL 10 MG/ML EMULSION: Performed by: ANESTHESIOLOGY

## 2020-08-14 PROCEDURE — C2627 CATH, SUPRAPUBIC/CYSTOSCOPIC: HCPCS | Performed by: COLON & RECTAL SURGERY

## 2020-08-14 PROCEDURE — 25010000002 DEXAMETHASONE PER 1 MG: Performed by: ANESTHESIOLOGY

## 2020-08-14 PROCEDURE — 87186 SC STD MICRODIL/AGAR DIL: CPT | Performed by: COLON & RECTAL SURGERY

## 2020-08-14 PROCEDURE — 25010000002 ONDANSETRON PER 1 MG: Performed by: ANESTHESIOLOGY

## 2020-08-14 PROCEDURE — 87070 CULTURE OTHR SPECIMN AEROBIC: CPT | Performed by: COLON & RECTAL SURGERY

## 2020-08-14 PROCEDURE — 46040 I&D ISCHIORCT&/PERIRCT ABSC: CPT | Performed by: COLON & RECTAL SURGERY

## 2020-08-14 PROCEDURE — 25010000002 MIDAZOLAM PER 1 MG: Performed by: ANESTHESIOLOGY

## 2020-08-14 PROCEDURE — 25010000002 HEPARIN (PORCINE) PER 1000 UNITS: Performed by: COLON & RECTAL SURGERY

## 2020-08-14 PROCEDURE — 25010000002 FENTANYL CITRATE (PF) 100 MCG/2ML SOLUTION: Performed by: ANESTHESIOLOGY

## 2020-08-14 PROCEDURE — 25010000002 HYDROMORPHONE PER 4 MG: Performed by: ANESTHESIOLOGY

## 2020-08-14 PROCEDURE — 87015 SPECIMEN INFECT AGNT CONCNTJ: CPT | Performed by: COLON & RECTAL SURGERY

## 2020-08-14 RX ORDER — FLUMAZENIL 0.1 MG/ML
0.2 INJECTION INTRAVENOUS AS NEEDED
Status: DISCONTINUED | OUTPATIENT
Start: 2020-08-14 | End: 2020-08-14 | Stop reason: HOSPADM

## 2020-08-14 RX ORDER — EPHEDRINE SULFATE 50 MG/ML
5 INJECTION, SOLUTION INTRAVENOUS ONCE AS NEEDED
Status: DISCONTINUED | OUTPATIENT
Start: 2020-08-14 | End: 2020-08-14 | Stop reason: HOSPADM

## 2020-08-14 RX ORDER — HYDRALAZINE HYDROCHLORIDE 20 MG/ML
5 INJECTION INTRAMUSCULAR; INTRAVENOUS
Status: DISCONTINUED | OUTPATIENT
Start: 2020-08-14 | End: 2020-08-14 | Stop reason: HOSPADM

## 2020-08-14 RX ORDER — PROPOFOL 10 MG/ML
VIAL (ML) INTRAVENOUS AS NEEDED
Status: DISCONTINUED | OUTPATIENT
Start: 2020-08-14 | End: 2020-08-14 | Stop reason: SURG

## 2020-08-14 RX ORDER — HYDROMORPHONE HCL 110MG/55ML
PATIENT CONTROLLED ANALGESIA SYRINGE INTRAVENOUS AS NEEDED
Status: DISCONTINUED | OUTPATIENT
Start: 2020-08-14 | End: 2020-08-14 | Stop reason: SURG

## 2020-08-14 RX ORDER — SODIUM CHLORIDE 0.9 % (FLUSH) 0.9 %
3 SYRINGE (ML) INJECTION EVERY 12 HOURS SCHEDULED
Status: DISCONTINUED | OUTPATIENT
Start: 2020-08-14 | End: 2020-08-14 | Stop reason: HOSPADM

## 2020-08-14 RX ORDER — LIDOCAINE HYDROCHLORIDE 20 MG/ML
INJECTION, SOLUTION INFILTRATION; PERINEURAL AS NEEDED
Status: DISCONTINUED | OUTPATIENT
Start: 2020-08-14 | End: 2020-08-14 | Stop reason: SURG

## 2020-08-14 RX ORDER — NALOXONE HCL 0.4 MG/ML
0.2 VIAL (ML) INJECTION AS NEEDED
Status: DISCONTINUED | OUTPATIENT
Start: 2020-08-14 | End: 2020-08-14 | Stop reason: HOSPADM

## 2020-08-14 RX ORDER — LIDOCAINE HYDROCHLORIDE 10 MG/ML
0.5 INJECTION, SOLUTION EPIDURAL; INFILTRATION; INTRACAUDAL; PERINEURAL ONCE AS NEEDED
Status: DISCONTINUED | OUTPATIENT
Start: 2020-08-14 | End: 2020-08-14 | Stop reason: HOSPADM

## 2020-08-14 RX ORDER — ONDANSETRON 2 MG/ML
INJECTION INTRAMUSCULAR; INTRAVENOUS AS NEEDED
Status: DISCONTINUED | OUTPATIENT
Start: 2020-08-14 | End: 2020-08-14 | Stop reason: SURG

## 2020-08-14 RX ORDER — ROCURONIUM BROMIDE 10 MG/ML
INJECTION, SOLUTION INTRAVENOUS AS NEEDED
Status: DISCONTINUED | OUTPATIENT
Start: 2020-08-14 | End: 2020-08-14 | Stop reason: SURG

## 2020-08-14 RX ORDER — SODIUM CHLORIDE 0.9 % (FLUSH) 0.9 %
3-10 SYRINGE (ML) INJECTION AS NEEDED
Status: DISCONTINUED | OUTPATIENT
Start: 2020-08-14 | End: 2020-08-14 | Stop reason: HOSPADM

## 2020-08-14 RX ORDER — FENTANYL CITRATE 50 UG/ML
INJECTION, SOLUTION INTRAMUSCULAR; INTRAVENOUS AS NEEDED
Status: DISCONTINUED | OUTPATIENT
Start: 2020-08-14 | End: 2020-08-14 | Stop reason: SURG

## 2020-08-14 RX ORDER — OXYCODONE AND ACETAMINOPHEN 7.5; 325 MG/1; MG/1
1 TABLET ORAL ONCE AS NEEDED
Status: DISCONTINUED | OUTPATIENT
Start: 2020-08-14 | End: 2020-08-14 | Stop reason: HOSPADM

## 2020-08-14 RX ORDER — HYDROCODONE BITARTRATE AND ACETAMINOPHEN 7.5; 325 MG/1; MG/1
1 TABLET ORAL ONCE AS NEEDED
Status: DISCONTINUED | OUTPATIENT
Start: 2020-08-14 | End: 2020-08-14 | Stop reason: HOSPADM

## 2020-08-14 RX ORDER — FENTANYL CITRATE 50 UG/ML
50 INJECTION, SOLUTION INTRAMUSCULAR; INTRAVENOUS
Status: DISCONTINUED | OUTPATIENT
Start: 2020-08-14 | End: 2020-08-14 | Stop reason: HOSPADM

## 2020-08-14 RX ORDER — FAMOTIDINE 10 MG/ML
20 INJECTION, SOLUTION INTRAVENOUS ONCE
Status: DISCONTINUED | OUTPATIENT
Start: 2020-08-14 | End: 2020-08-14 | Stop reason: HOSPADM

## 2020-08-14 RX ORDER — SODIUM CHLORIDE, SODIUM LACTATE, POTASSIUM CHLORIDE, CALCIUM CHLORIDE 600; 310; 30; 20 MG/100ML; MG/100ML; MG/100ML; MG/100ML
9 INJECTION, SOLUTION INTRAVENOUS CONTINUOUS
Status: DISCONTINUED | OUTPATIENT
Start: 2020-08-14 | End: 2020-08-14 | Stop reason: HOSPADM

## 2020-08-14 RX ORDER — DIPHENHYDRAMINE HCL 25 MG
25 CAPSULE ORAL
Status: DISCONTINUED | OUTPATIENT
Start: 2020-08-14 | End: 2020-08-14 | Stop reason: HOSPADM

## 2020-08-14 RX ORDER — DIPHENHYDRAMINE HYDROCHLORIDE 50 MG/ML
12.5 INJECTION INTRAMUSCULAR; INTRAVENOUS
Status: DISCONTINUED | OUTPATIENT
Start: 2020-08-14 | End: 2020-08-14 | Stop reason: HOSPADM

## 2020-08-14 RX ORDER — PROMETHAZINE HYDROCHLORIDE 25 MG/1
25 SUPPOSITORY RECTAL ONCE AS NEEDED
Status: DISCONTINUED | OUTPATIENT
Start: 2020-08-14 | End: 2020-08-14 | Stop reason: HOSPADM

## 2020-08-14 RX ORDER — MAGNESIUM HYDROXIDE 1200 MG/15ML
LIQUID ORAL AS NEEDED
Status: DISCONTINUED | OUTPATIENT
Start: 2020-08-14 | End: 2020-08-14 | Stop reason: HOSPADM

## 2020-08-14 RX ORDER — PROMETHAZINE HYDROCHLORIDE 25 MG/ML
6.25 INJECTION, SOLUTION INTRAMUSCULAR; INTRAVENOUS
Status: DISCONTINUED | OUTPATIENT
Start: 2020-08-14 | End: 2020-08-14 | Stop reason: HOSPADM

## 2020-08-14 RX ORDER — PROMETHAZINE HYDROCHLORIDE 25 MG/ML
12.5 INJECTION, SOLUTION INTRAMUSCULAR; INTRAVENOUS ONCE AS NEEDED
Status: DISCONTINUED | OUTPATIENT
Start: 2020-08-14 | End: 2020-08-14 | Stop reason: HOSPADM

## 2020-08-14 RX ORDER — MIDAZOLAM HYDROCHLORIDE 1 MG/ML
1 INJECTION INTRAMUSCULAR; INTRAVENOUS
Status: DISCONTINUED | OUTPATIENT
Start: 2020-08-14 | End: 2020-08-14 | Stop reason: HOSPADM

## 2020-08-14 RX ORDER — LABETALOL HYDROCHLORIDE 5 MG/ML
5 INJECTION, SOLUTION INTRAVENOUS
Status: DISCONTINUED | OUTPATIENT
Start: 2020-08-14 | End: 2020-08-14 | Stop reason: HOSPADM

## 2020-08-14 RX ORDER — DEXAMETHASONE SODIUM PHOSPHATE 4 MG/ML
INJECTION, SOLUTION INTRA-ARTICULAR; INTRALESIONAL; INTRAMUSCULAR; INTRAVENOUS; SOFT TISSUE AS NEEDED
Status: DISCONTINUED | OUTPATIENT
Start: 2020-08-14 | End: 2020-08-14 | Stop reason: SURG

## 2020-08-14 RX ORDER — PROMETHAZINE HYDROCHLORIDE 25 MG/1
25 TABLET ORAL ONCE AS NEEDED
Status: DISCONTINUED | OUTPATIENT
Start: 2020-08-14 | End: 2020-08-14 | Stop reason: HOSPADM

## 2020-08-14 RX ORDER — ONDANSETRON 2 MG/ML
4 INJECTION INTRAMUSCULAR; INTRAVENOUS ONCE AS NEEDED
Status: DISCONTINUED | OUTPATIENT
Start: 2020-08-14 | End: 2020-08-14 | Stop reason: HOSPADM

## 2020-08-14 RX ORDER — HYDROMORPHONE HYDROCHLORIDE 1 MG/ML
0.5 INJECTION, SOLUTION INTRAMUSCULAR; INTRAVENOUS; SUBCUTANEOUS
Status: DISCONTINUED | OUTPATIENT
Start: 2020-08-14 | End: 2020-08-14 | Stop reason: HOSPADM

## 2020-08-14 RX ADMIN — HYDROMORPHONE HYDROCHLORIDE 0.5 MG: 2 INJECTION, SOLUTION INTRAMUSCULAR; INTRAVENOUS; SUBCUTANEOUS at 13:26

## 2020-08-14 RX ADMIN — TAZOBACTAM SODIUM AND PIPERACILLIN SODIUM 3.38 G: 375; 3 INJECTION, SOLUTION INTRAVENOUS at 12:29

## 2020-08-14 RX ADMIN — FENTANYL CITRATE 50 MCG: 50 INJECTION, SOLUTION INTRAMUSCULAR; INTRAVENOUS at 14:00

## 2020-08-14 RX ADMIN — SODIUM CHLORIDE 50 ML/HR: 9 INJECTION, SOLUTION INTRAVENOUS at 15:28

## 2020-08-14 RX ADMIN — FENTANYL CITRATE 50 MCG: 50 INJECTION, SOLUTION INTRAMUSCULAR; INTRAVENOUS at 13:50

## 2020-08-14 RX ADMIN — SODIUM CHLORIDE, POTASSIUM CHLORIDE, SODIUM LACTATE AND CALCIUM CHLORIDE 9 ML/HR: 600; 310; 30; 20 INJECTION, SOLUTION INTRAVENOUS at 11:22

## 2020-08-14 RX ADMIN — TAZOBACTAM SODIUM AND PIPERACILLIN SODIUM 3.38 G: 375; 3 INJECTION, SOLUTION INTRAVENOUS at 08:10

## 2020-08-14 RX ADMIN — SUGAMMADEX 400 MG: 100 INJECTION, SOLUTION INTRAVENOUS at 13:01

## 2020-08-14 RX ADMIN — HYDROMORPHONE HYDROCHLORIDE 0.5 MG: 1 INJECTION, SOLUTION INTRAMUSCULAR; INTRAVENOUS; SUBCUTANEOUS at 14:50

## 2020-08-14 RX ADMIN — HYDROMORPHONE HYDROCHLORIDE 0.5 MG: 1 INJECTION, SOLUTION INTRAMUSCULAR; INTRAVENOUS; SUBCUTANEOUS at 14:15

## 2020-08-14 RX ADMIN — LIDOCAINE HYDROCHLORIDE 60 MG: 20 INJECTION, SOLUTION INFILTRATION; PERINEURAL at 12:15

## 2020-08-14 RX ADMIN — ONDANSETRON HYDROCHLORIDE 4 MG: 2 SOLUTION INTRAMUSCULAR; INTRAVENOUS at 12:57

## 2020-08-14 RX ADMIN — HYDROMORPHONE HYDROCHLORIDE 0.5 MG: 1 INJECTION, SOLUTION INTRAMUSCULAR; INTRAVENOUS; SUBCUTANEOUS at 13:55

## 2020-08-14 RX ADMIN — SODIUM CHLORIDE, PRESERVATIVE FREE 10 ML: 5 INJECTION INTRAVENOUS at 08:11

## 2020-08-14 RX ADMIN — FENTANYL CITRATE 50 MCG: 50 INJECTION INTRAMUSCULAR; INTRAVENOUS at 12:21

## 2020-08-14 RX ADMIN — ROCURONIUM BROMIDE 10 MG: 10 INJECTION INTRAVENOUS at 12:21

## 2020-08-14 RX ADMIN — ROCURONIUM BROMIDE 50 MG: 10 INJECTION INTRAVENOUS at 12:16

## 2020-08-14 RX ADMIN — HYDROMORPHONE HYDROCHLORIDE 0.5 MG: 1 INJECTION, SOLUTION INTRAMUSCULAR; INTRAVENOUS; SUBCUTANEOUS at 13:40

## 2020-08-14 RX ADMIN — FENTANYL CITRATE 50 MCG: 50 INJECTION INTRAMUSCULAR; INTRAVENOUS at 12:15

## 2020-08-14 RX ADMIN — HEPARIN SODIUM 5000 UNITS: 5000 INJECTION INTRAVENOUS; SUBCUTANEOUS at 05:39

## 2020-08-14 RX ADMIN — HYDROCODONE BITARTRATE AND ACETAMINOPHEN 2 TABLET: 5; 325 TABLET ORAL at 05:39

## 2020-08-14 RX ADMIN — ESCITALOPRAM 20 MG: 20 TABLET, FILM COATED ORAL at 08:10

## 2020-08-14 RX ADMIN — PROPOFOL 50 MG: 10 INJECTION, EMULSION INTRAVENOUS at 12:19

## 2020-08-14 RX ADMIN — FENTANYL CITRATE 50 MCG: 50 INJECTION, SOLUTION INTRAMUSCULAR; INTRAVENOUS at 14:40

## 2020-08-14 RX ADMIN — METHOCARBAMOL TABLETS 500 MG: 500 TABLET, COATED ORAL at 05:39

## 2020-08-14 RX ADMIN — DEXAMETHASONE SODIUM PHOSPHATE 4 MG: 4 INJECTION INTRA-ARTICULAR; INTRALESIONAL; INTRAMUSCULAR; INTRAVENOUS; SOFT TISSUE at 12:31

## 2020-08-14 RX ADMIN — PROPOFOL 200 MG: 10 INJECTION, EMULSION INTRAVENOUS at 12:15

## 2020-08-14 RX ADMIN — MIDAZOLAM 1 MG: 1 INJECTION INTRAMUSCULAR; INTRAVENOUS at 11:22

## 2020-08-14 RX ADMIN — PROPOFOL 50 MG: 10 INJECTION, EMULSION INTRAVENOUS at 12:24

## 2020-08-14 RX ADMIN — FAMOTIDINE 20 MG: 20 TABLET, FILM COATED ORAL at 08:10

## 2020-08-14 RX ADMIN — FENTANYL CITRATE 50 MCG: 50 INJECTION, SOLUTION INTRAMUSCULAR; INTRAVENOUS at 13:40

## 2020-08-14 NOTE — PLAN OF CARE
Problem: Patient Care Overview  Goal: Plan of Care Review  Outcome: Outcome(s) achieved  Flowsheets (Taken 8/14/2020 3514)  Progress: improving  Plan of Care Reviewed With: patient    Note:   Vitals stable, Pain controlled. Surgery done today - drain placed. Pt discharged to home with wound vac, drain , and antibiotics. HH following.       Goal: Individualization and Mutuality  Outcome: Outcome(s) achieved  Goal: Discharge Needs Assessment  Outcome: Outcome(s) achieved  Goal: Interprofessional Rounds/Family Conf  Outcome: Outcome(s) achieved     Problem: Pain, Acute (Adult)  Goal: Identify Related Risk Factors and Signs and Symptoms  Outcome: Outcome(s) achieved  Goal: Acceptable Pain Control/Comfort Level  Outcome: Outcome(s) achieved     Problem: Infection, Risk/Actual (Adult)  Goal: Identify Related Risk Factors and Signs and Symptoms  Outcome: Outcome(s) achieved  Goal: Infection Prevention/Resolution  Outcome: Outcome(s) achieved

## 2020-08-14 NOTE — ANESTHESIA PROCEDURE NOTES
Airway  Urgency: elective    Date/Time: 8/14/2020 12:28 PM  Airway not difficult    General Information and Staff    Patient location during procedure: OR  Anesthesiologist: Faustina Almonte MD    Indications and Patient Condition  Indications for airway management: airway protection    Preoxygenated: yes  MILS maintained throughout  Mask difficulty assessment: 1 - vent by mask    Final Airway Details  Final airway type: endotracheal airway      Successful airway: ETT  Cuffed: yes   Successful intubation technique: direct laryngoscopy  Endotracheal tube insertion site: oral  Blade: Luis Antonio  Blade size: 3  ETT size (mm): 7.0  Cormack-Lehane Classification: grade IIa - partial view of glottis  Placement verified by: chest auscultation and capnometry   Measured from: lips  Number of attempts at approach: 1  Assessment: lips, teeth, and gum same as pre-op and atraumatic intubation

## 2020-08-14 NOTE — ANESTHESIA POSTPROCEDURE EVALUATION
"Patient: Carole Weaver    Procedure Summary     Date:  08/14/20 Room / Location:  [facility] OR [address] [place] [address] MAIN OR    Anesthesia Start:  1214 Anesthesia Stop:  1333    Procedure:  INCISION AND DRAINAGE OF retrorectal dehiscence abcess with drain placement and irrigation (N/A Perirectal) Diagnosis:       Perirectal abscess      (Perirectal abscess [K61.1])    Surgeon:  Padmaja Ye MD Provider:  Faustina Almonte MD    Anesthesia Type:  general ASA Status:  3          Anesthesia Type: general    Vitals  Vitals Value Taken Time   /78 8/14/2020  2:30 PM   Temp 36.6 °C (97.9 °F) 8/14/2020  1:30 PM   Pulse 75 8/14/2020  2:44 PM   Resp 16 8/14/2020  2:15 PM   SpO2 93 % 8/14/2020  2:44 PM   Vitals shown include unvalidated device data.        Post Anesthesia Care and Evaluation    Patient location during evaluation: bedside  Pain management: adequate  Airway patency: patent  Anesthetic complications: No anesthetic complications    Cardiovascular status: acceptable  Respiratory status: acceptable  Hydration status: acceptable    Comments: /77   Pulse 76   Temp 36.6 °C (97.9 °F) (Oral)   Resp 16   Ht 167.6 cm (66\")   Wt 99.3 kg (219 lb)   SpO2 99%   Breastfeeding No   BMI 35.35 kg/m²         "

## 2020-08-14 NOTE — ANESTHESIA PREPROCEDURE EVALUATION
Anesthesia Evaluation     NPO Solid Status: > 8 hours             Airway   Mallampati: III  Dental      Pulmonary    (+) pulmonary embolism, a smoker Current Abstained day of surgery, sleep apnea,   Cardiovascular         Neuro/Psych  (+) psychiatric history Anxiety and Depression,     GI/Hepatic/Renal/Endo    (+) obesity,  GERD,      Musculoskeletal     Abdominal    Substance History      OB/GYN          Other                        Anesthesia Plan    ASA 3     general       Anesthetic plan, all risks, benefits, and alternatives have been provided, discussed and informed consent has been obtained with: patient.

## 2020-08-14 NOTE — PLAN OF CARE
"Pt has been on clear liquids since midnight, Pt to be NPO at 0730.  Beginning of shift Pt was upset and cried some, frustrated with medical issues and \"just wants to get better and back to normal\". IVFs infusing NS 50mL/hr, wound vac in place, PRN pain med admin x1 this shift.  "

## 2020-08-15 NOTE — OUTREACH NOTE
Prep Survey      Responses   [facility] patient discharged from?  [place]   Is LACE score < 7 ?  No   Eligibility  [facility]   Date of Admission  08/12/20   Date of Discharge  08/14/20   Discharge Disposition  Home or Self Care   Discharge diagnosis  Abdominopelvic abscess INCISION AND DRAINAGE OF retrorectal dehiscence abcess with drain placement and irrigation   COVID-19 Test Status  Negative   Does the patient have one of the following disease processes/diagnoses(primary or secondary)?  General Surgery   Does the patient have Home health ordered?  Yes   What is the Home health agency?   [facility]   Is there a DME ordered?  Yes   What DME was ordered?  [employer] to obtain wound vac  OSTOMY SUPPLIES   Prep survey completed?  Yes          Sandrita Headley RN

## 2020-08-16 LAB
BACTERIA FLD CULT: ABNORMAL
BACTERIA FLD CULT: ABNORMAL
BACTERIA FLD CULT: NORMAL
GRAM STN SPEC: ABNORMAL
GRAM STN SPEC: NORMAL
GRAM STN SPEC: NORMAL

## 2020-08-17 ENCOUNTER — TELEPHONE (OUTPATIENT)
Dept: SURGERY | Facility: CLINIC | Age: 40
End: 2020-08-17

## 2020-08-17 ENCOUNTER — TRANSITIONAL CARE MANAGEMENT TELEPHONE ENCOUNTER (OUTPATIENT)
Dept: CALL CENTER | Facility: HOSPITAL | Age: 40
End: 2020-08-17

## 2020-08-17 NOTE — PROGRESS NOTES
Case Management Discharge Note      Final Note: Home with family and [facility]. Home wound vac supplied through [facility]. Family transport.    Provided Post Acute Provider List?: Refused  Refused Provider List Comment: Had used [facility] in the past and wanted them again    Destination      No service has been selected for the patient.      Durable Medical Equipment      Service Provider Request Status Selected Services Address Phone Number Fax Number    [facility] Selected [facility] [id number] [address] [phone]       Lebron Muniz RN 8/13/2020 1536    [facility]                 Dialysis/Infusion      No service has been selected for the patient.      Home Medical Care - Selection Complete      Service Provider Request Status Selected Services Address Phone Number Fax Number    [facility] [place] Selected Home Health Services [address] [address] [phone]      Therapy      No service has been selected for the patient.      Community Resources      No service has been selected for the patient.        Transportation Services  Private: Car    Final Discharge Disposition Code: 06 - home with home health care

## 2020-08-17 NOTE — TELEPHONE ENCOUNTER
Raya has override for this, she will reschedule and call/notify pt.    Thank you.    ----- Message from Padmaja Ye MD sent at 8/14/2020  5:41 PM EDT -----  Somebody make appt for fri 21 please

## 2020-08-17 NOTE — OUTREACH NOTE
Call Center TCM Note      Responses   [facility] patient discharged from?  [place]   Does the patient have one of the following disease processes/diagnoses(primary or secondary)?  General Surgery   TCM attempt successful?  Yes   Call start time  1039   Call end time  1048   Discharge diagnosis  Abdominopelvic abscess INCISION AND DRAINAGE OF retrorectal dehiscence abcess with drain placement and irrigation   Is patient permission given to speak with other caregiver?  No   Meds reviewed with patient/caregiver?  Yes   Is the patient having any side effects they believe may be caused by any medication additions or changes?  No   Does the patient have all medications related to this admission filled (includes all antibiotics, pain medications, etc.)  Yes   Is the patient taking all medications as directed (includes completed medication regime)?  Yes   Does the patient have a follow up appointment scheduled with their surgeon?  Yes [Friday, 8/21]   Has the patient kept scheduled appointments due by today?  N/A   Comments  No PCP hospital follow up in place.    What is the Home health agency?   [facility]   Has home health visited the patient within 72 hours of discharge?  Yes [Patient reports contact with  on Friday, but has not heard back from them since and expecting them today ]   Home health comments  Patient to call  and ask when nurse will be visiting today.    What DME was ordered?  Wound vac, ostomy supplies.    Has all DME been delivered?  Yes   Psychosocial issues?  No   Did the patient receive a copy of their discharge instructions?  Yes   Nursing interventions  Reviewed instructions with patient   What is the patient's perception of their health status since discharge?  Improving   Nursing interventions  Nurse provided patient education   Is the patient /caregiver able to teach back basic post-op care?  Continue use of incentive spirometry at least 1 week post discharge, Practice 'cough and deep  breath'   Is the patient/caregiver able to teach back signs and symptoms of incisional infection?  Fever, Pus or odor from incision, Increased drainage or bleeding, Increased redness, swelling or pain at the incisonal site   Is the patient/caregiver able to teach back steps to recovery at home?  Set small, achievable goals for return to baseline health   Is the patient/caregiver able to teach back the hierarchy of who to call/visit for symptoms/problems? PCP, Specialist, Home health nurse, Urgent Care, ED, 911  Yes   TCM call completed?  Yes   Wrap up additional comments  Patient reports no fever since discharge. Reports passing stool per ileostomy  and denies issues with urination. Wound vac in place and awaiting HH visit today. Denies any medication issues.           Sandrita Spicer RN    8/17/2020, 10:48

## 2020-08-17 NOTE — PROGRESS NOTES
Left message for patient to call back to let us know if she would like to be seen in the office by PCP. If so we have plenty of appointments available next week

## 2020-08-18 LAB — BACTERIA SPEC ANAEROBE CULT: NORMAL

## 2020-08-21 ENCOUNTER — OFFICE VISIT (OUTPATIENT)
Dept: SURGERY | Facility: CLINIC | Age: 40
End: 2020-08-21

## 2020-08-21 VITALS
OXYGEN SATURATION: 97 % | HEIGHT: 66 IN | HEART RATE: 105 BPM | SYSTOLIC BLOOD PRESSURE: 120 MMHG | TEMPERATURE: 97.2 F | BODY MASS INDEX: 35.89 KG/M2 | DIASTOLIC BLOOD PRESSURE: 70 MMHG | WEIGHT: 223.3 LBS

## 2020-08-21 DIAGNOSIS — K65.1 ABDOMINOPELVIC ABSCESS (HCC): Primary | ICD-10-CM

## 2020-08-21 PROCEDURE — 99024 POSTOP FOLLOW-UP VISIT: CPT | Performed by: COLON & RECTAL SURGERY

## 2020-08-21 NOTE — PROGRESS NOTES
"Carole Weaver is a 40 y.o. female in for follow up of Abdominopelvic abscess (CMS/HCC)    Drainage lessening  3 more days of abx left  No fever  Rectal pressure better      /70 (BP Location: Right arm, Patient Position: Sitting, Cuff Size: Small Adult)   Pulse 105   Temp 97.2 °F (36.2 °C)   Ht 167.6 cm (66\")   Wt 101 kg (223 lb 4.8 oz)   SpO2 97%   Breastfeeding No   BMI 36.04 kg/m²   Body mass index is 36.04 kg/m².      PE:  Physical Exam   Constitutional: She appears well-developed. No distress.   HENT:   Head: Normocephalic and atraumatic.   Abdominal: Soft. She exhibits no distension.   Genitourinary:   Genitourinary Comments: Perianal exam: pezzar in place. Stitches cut and pezzar removed   Musculoskeletal: Normal range of motion.   Neurological: She is alert.   Psychiatric: Thought content normal.         Assessment:   1. Abdominopelvic abscess (CMS/HCC)         Plan:  Finish abx  Call if pressure increases  Will wait for [facility] for 3 mons at least          "

## 2020-08-24 ENCOUNTER — READMISSION MANAGEMENT (OUTPATIENT)
Dept: CALL CENTER | Facility: HOSPITAL | Age: 40
End: 2020-08-24

## 2020-08-24 NOTE — OUTREACH NOTE
General Surgery Week 2 Survey      Responses   [facility] patient discharged from?  [place]   Does the patient have one of the following disease processes/diagnoses(primary or secondary)?  General Surgery   Week 2 attempt successful?  No   Unsuccessful attempts  Attempt 1          Barbara Mason RN

## 2020-08-26 ENCOUNTER — READMISSION MANAGEMENT (OUTPATIENT)
Dept: CALL CENTER | Facility: HOSPITAL | Age: 40
End: 2020-08-26

## 2020-08-26 NOTE — OUTREACH NOTE
"General Surgery Week 2 Survey      Responses   [facility] patient discharged from?  [place]   Does the patient have one of the following disease processes/diagnoses(primary or secondary)?  General Surgery   Week 2 attempt successful?  Yes   Call start time  1359   Call end time  1400   Discharge diagnosis  Abdominopelvic abscess INCISION AND DRAINAGE OF retrorectal dehiscence abcess with drain placement and irrigation   Person spoke with today (if not patient) and relationship  Justus-spouse    Meds reviewed with patient/caregiver?  Yes   Is the patient taking all medications as directed (includes completed medication regime)?  Yes   Has the patient kept scheduled appointments due by today?  Yes   What is the Home health agency?   [facility]   Home health comments  Home health visited today    What is the patient's perception of their health status since discharge?  Improving   Is the patient/caregiver able to teach back signs and symptoms of incisional infection?  Pus or odor from incision   Is the patient/caregiver able to teach back steps to recovery at home?  Rest and rebuild strength, gradually increase activity, Eat a well-balance diet   If the patient is a current smoker, are they able to teach back resources for cessation?  [id number] [Smoker]   Week 2 call completed?  Yes   Revoked  No further contact(revokes)-requires comment   Graduated/Revoked comments  Justus reports, \"we have an army of people helping.\" No further calls needed.           Charisma Mar RN  "

## 2020-08-27 NOTE — DISCHARGE SUMMARY
Date of Admission: 8/3/2020  Date of Discharge:  8/7/2020    Presenting Problem/History of Present Illness  Rectal cancer (CMS/HCC) [C20]  Rectal cancer (CMS/HCC) [C20]     Discharge Diagnosis:   Past Medical History:   Diagnosis Date   • Abnormal Pap smear of cervix 02/02/1998    JULIUS I   • Anemia in pregnancy    • Anxiety    • Bilateral epiphora 04/2020   • Bilateral hand swelling 05/02/2020    SEEN AT [facility] ER   • Cervical lymphadenitis 06/06/2012    SEEN AT [facility] ER   • Chemotherapy induced neutropenia (CMS/HCC) 04/2020   • Chemotherapy-induced nausea 02/2020   • Chemotherapy-induced thrombocytopenia 05/2020   • Chronic diarrhea    • Colon polyps     FOLLOWED BY DR. GERONIMO ESPARZA   • Cystitis 04/24/2020    WITH DEHYDRATION, ADMITTED TO [facility]   • Drug-induced peripheral neuropathy (CMS/HCC) 05/2020   • Fistula of intestine    • GERD (gastroesophageal reflux disease)    • Hand foot syndrome 05/2020   • Heart murmur     IN CHILDHOOD   • Hematochezia    • Hemorrhoids    • Heterozygous factor V Leiden mutation (CMS/HCC)     DX 8-2-2019   • History of anemia    • History of chemotherapy 2019    FOLLOWED BY DR. ALEXANDRU HUNT   • History of gestational diabetes    • History of pre-eclampsia    • History of radiation therapy 2019    FOLLOWED BY DR. JAVON LEWIS   • History of TB skin testing 01/17/2009    TB Skin Test   • HPV in female 1998   • Hypokalemia 09/2019   • Hypomagnesemia 09/2019   • IBS (irritable bowel syndrome)    • Ileostomy in place (CMS/HCC)     FOLLOWED BY DR. GERONIMO ESPARZA   • Infected insect bite of neck 05/27/2016    SEEN AT [facility]   • Kidney stones 08/09/2007    SEEN AT [facility] ER   • Lump of right breast     SWOLLEN LYMPH NODE   • Monopolar depression (CMS/HCC)    • On anticoagulant therapy    • PIH (pregnancy induced hypertension) 1998   • Pulmonary embolism without acute cor pulmonale (CMS/HCC) 09/20/2019    X 3; ADMITTED TO [facility]   • Pulmonary nodule, right 05/2020   • Rectal cancer (CMS/HCC) 07/08/2019     INVASIVE MODERATELY DIFFERENTIATED ADENOCARCINOMA, CHEMO AND XRT FINISHED 2019   • Right ureteral stone 10/01/2019    SEEN AT [facility] ER   • SAB (spontaneous ) 1996     A2-1 INDUCED   • Sciatica    • Sepsis due to cellulitis (CMS/HCC) 2002    LEFT GREAT TOE, ADMITTED TO [facility]   • Tachycardia 2020   • Urinary urgency 2020       Procedures Performed  Procedure(s):  LOW ANTERIOR COLON RESECTION, COLOANAL ANASTOMOSIS, MOBILIZATION SPLENIC FLEXURE      Hospital Course  Patient is a 40 y.o. female presented with anastomotic leak that did not heal.  She is undergone adjuvant chemotherapy and now comes to have the anastomosis redone.  She underwent surgery and did well postop.  Her diet was advanced.  Braswell was removed and she was able to void successfully.  Her ostomy was working well.  She was deemed stable for discharge      Consults:   Consults     No orders found from 2020 to 2020.            Discharge Disposition  Home or Self Care    Discharge Medications     Discharge Medications      New Medications      Instructions Start Date   methocarbamol 500 MG tablet  Commonly known as:  ROBAXIN   500 mg, Oral, Every 8 Hours Scheduled         Changes to Medications      Instructions Start Date   famotidine 20 MG tablet  Commonly known as:  PEPCID  What changed:  when to take this   TAKE 1 TABLET BY MOUTH TWICE A DAY      rivaroxaban 20 MG tablet  Commonly known as:  Xarelto  What changed:  additional instructions   20 mg, Oral, Daily         Continue These Medications      Instructions Start Date   Claritin 10 MG capsule  Generic drug:  Loratadine   10 mg, Oral, Every Evening      clonazePAM 1 MG tablet  Commonly known as:  KlonoPIN   1 mg, Oral, 3 Times Daily PRN      ondansetron 8 MG tablet  Commonly known as:  ZOFRAN   8 mg, Oral, 3 Times Daily PRN      prochlorperazine 10 MG tablet  Commonly known as:  COMPAZINE   10 mg, Oral, Every 6 Hours PRN             Discharge Diet:   Diet  Instructions     Diet: Regular      Discharge Diet:  Regular          Activity at Discharge:   Activity Instructions     Driving Restrictions      Type of Restriction:  Driving    Driving Restrictions:  No Driving Until Next Appointment    Gradually Increase Activity Until at Pre-Hospitalization Level      Lifting Restrictions      Type of Restriction:  Lifting    Lifting Restrictions:  Lifting Restriction (Indicate Limit)    Weight Limit (Pounds):  15    Length of Lifting Restriction:  until office appt          Follow-up Appointments  Future Appointments   Date Time Provider Department Center   9/4/2020  8:10 AM Padmaja Ye MD [facility] CRS  [facility] None   9/9/2020  8:15 AM INFU CBC [facility] PORT CHAIR BH INFUS [facility] LAG   9/9/2020  9:15 AM [facility] PET CT BH [facility] PET [facility]   9/16/2020  9:20 AM VITALS ONLY CBC [facility] BH LAB [facility]   9/16/2020  9:40 AM Dong Nguyen MD PhD [facility] CBC [facility]   10/9/2020  2:30 PM Deepika Vela III, NP-C [facility] PC [facility] None     Additional Instructions for the Follow-ups that You Need to Schedule     Call MD With Problems / Concerns   As directed      Instructions: temp >101. Drainage from wound. vomitting.    Order Comments:  Instructions: temp >101. Drainage from wound. vomitting.          Discharge Follow-up with Specified Provider: Ephraim; 2 Weeks   As directed      To:  Ephraim    Follow Up:  2 Weeks               Discharge Diet:   As tolerated  Activity at Discharge:   As tolerated  Follow-up Appointments  2 wks

## 2020-08-27 NOTE — DISCHARGE SUMMARY
Date of Admission: 8/12/2020  Date of Discharge:  8/14/2020    Presenting Problem/History of Present Illness  Abdominopelvic abscess (CMS/HCC) [K65.1]  Abdominopelvic abscess (CMS/HCC) [K65.1]     Discharge Diagnosis:   Past Medical History:   Diagnosis Date   • Abnormal Pap smear of cervix 02/02/1998    JULIUS I   • Anemia in pregnancy    • Anxiety    • Bilateral epiphora 04/2020   • Bilateral hand swelling 05/02/2020    SEEN AT [facility] ER   • Cervical lymphadenitis 06/06/2012    SEEN AT [facility] ER   • Chemotherapy induced neutropenia (CMS/HCC) 04/2020   • Chemotherapy-induced nausea 02/2020   • Chemotherapy-induced thrombocytopenia 05/2020   • Chronic diarrhea    • Colon polyps     FOLLOWED BY DR. GERONIMO ESPARZA   • Cystitis 04/24/2020    WITH DEHYDRATION, ADMITTED TO [facility]   • Drug-induced peripheral neuropathy (CMS/[facility]) 05/2020   • Fistula of intestine    • GERD (gastroesophageal reflux disease)    • Hand foot syndrome 05/2020   • Heart murmur     IN CHILDHOOD   • Hematochezia    • Hemorrhoids    • Heterozygous factor V Leiden mutation (CMS/[facility])     DX 8-2-2019   • History of anemia    • History of chemotherapy 2019    FOLLOWED BY DR. ALEXANDRU HUNT   • History of gestational diabetes    • History of pre-eclampsia    • History of radiation therapy 2019    FOLLOWED BY DR. JAVON LEWIS   • History of TB skin testing 01/17/2009    TB Skin Test   • HPV in female 1998   • Hypokalemia 09/2019   • Hypomagnesemia 09/2019   • IBS (irritable bowel syndrome)    • Ileostomy in place (CMS/[facility])     FOLLOWED BY DR. GERONIMO ESPARZA   • Infected insect bite of neck 05/27/2016    SEEN AT [facility]   • Kidney stones 08/09/2007    SEEN AT [facility] ER   • Lump of right breast     SWOLLEN LYMPH NODE   • Monopolar depression (CMS/[facility])    • On anticoagulant therapy    • PIH (pregnancy induced hypertension) 1998   • Pulmonary embolism without acute cor pulmonale (CMS/[facility]) 09/20/2019    X 3; ADMITTED TO [facility]   • Pulmonary nodule, right 05/2020   • Rectal  cancer (CMS/[facility]) 2019    INVASIVE MODERATELY DIFFERENTIATED ADENOCARCINOMA, CHEMO AND XRT FINISHED 2019   • Right ureteral stone 10/01/2019    SEEN AT [facility] ER   • SAB (spontaneous ) 1996     A2-1 INDUCED   • Sciatica    • Sepsis due to cellulitis (CMS/[facility]) 2002    LEFT GREAT TOE, ADMITTED TO [facility]   • Tachycardia 2020   • Urinary urgency 2020       Procedures Performed  * No procedures listed *      Hospital Course  Patient is a 40 y.o. female that was admitted with drainage coming from midline wound.  CT was done and found pelvic abscess and fluid medial to the stoma.  Cultures were taken on both.  The fluid medial to the stoma had no growth.  The pelvic abscess grew Enterobacter and enterococcus.  Wound VAC was placed on the midline wound.  Patient underwent surgery for the pelvic abscess with drainage.  Patient did well and was discharged home on antibiotics, wound VAC, pelvic drain in place.      Consults:   Consults     No orders found from 2020 to 2020.            Discharge Disposition  [facility]    Discharge Medications     Discharge Medications      Changes to Medications      Instructions Start Date   famotidine 20 MG tablet  Commonly known as:  PEPCID  What changed:  when to take this   TAKE 1 TABLET BY MOUTH TWICE A DAY      rivaroxaban 20 MG tablet  Commonly known as:  Xarelto  What changed:  additional instructions   20 mg, Oral, Daily         Continue These Medications      Instructions Start Date   Claritin 10 MG capsule  Generic drug:  Loratadine   10 mg, Oral, Every Evening      clonazePAM 1 MG tablet  Commonly known as:  KlonoPIN   1 mg, Oral, 3 Times Daily PRN      dicyclomine 20 MG tablet  Commonly known as:  Bentyl   20 mg, Oral, Every 6 Hours      escitalopram 20 MG tablet  Commonly known as:  LEXAPRO   20 mg, Oral, Daily      HYDROcodone-acetaminophen 7.5-325 MG per tablet  Commonly known as:  NORCO   1 tablet, Oral, Every 6 Hours  PRN      methocarbamol 500 MG tablet  Commonly known as:  ROBAXIN   500 mg, Oral, Every 8 Hours Scheduled      ondansetron 8 MG tablet  Commonly known as:  ZOFRAN   8 mg, Oral, 3 Times Daily PRN      prochlorperazine 10 MG tablet  Commonly known as:  COMPAZINE   10 mg, Oral, Every 6 Hours PRN         Stop These Medications    sodium hypochlorite 0.25 % topical solution  Commonly known as:  DAKIN'S        ASK your doctor about these medications      Instructions Start Date   levoFLOXacin 500 MG tablet  Commonly known as:  Levaquin  Ask about: Should I take this medication?   500 mg, Oral, Daily      metroNIDAZOLE 500 MG tablet  Commonly known as:  FLAGYL  Ask about: Should I take this medication?   500 mg, Oral, 3 Times Daily             Discharge Diet:   Diet Instructions     Diet: Regular      Discharge Diet:  Regular          Activity at Discharge:   Activity Instructions     Driving Restrictions      Type of Restriction:  Driving    Driving Restrictions:  No Driving Until Next Appointment    Gradually Increase Activity Until at Pre-Hospitalization Level      Lifting Restrictions      Type of Restriction:  Lifting    Lifting Restrictions:  Lifting Restriction (Indicate Limit)    Weight Limit (Pounds):  15    Length of Lifting Restriction:  until office appt          Follow-up Appointments  Future Appointments   Date Time Provider Department Center   9/4/2020  8:10 AM Padmaja Ye MD [facility]  [facility] None   9/9/2020  8:15 AM INFU CBC [facility] PORT CHAIR BH INFUS [facility]AG   9/9/2020  9:15 AM T[facility]ON PET CT  [facility] PET [facility]   9/16/2020  9:20 AM VITALS ONLY CBC KRE BH LAB [facility]   9/16/2020  9:40 AM Dong Nguyen MD PhD [facility] CBC [facility]   10/9/2020  2:30 PM Deepika Vela III, NP-C [facility] PC [facility] None     Additional Instructions for the Follow-ups that You Need to Schedule     Call MD With Problems / Concerns   As directed      Instructions: temp >101. Drainage from wound. vomitting.    Order Comments:   Instructions: temp >101. Drainage from wound. vomitting.          Discharge Follow-up with Specified Provider: Ephraim; 2 Weeks   As directed      To:  Ephraim    Follow Up:  2 Weeks         Referral to Home Health   As directed      Face to Face Visit Date:  8/14/2020    Follow-up provider for Plan of Care?:  I will be treating the patient on an ongoing basis.  Please send me the Plan of Care for signature.    Follow-up provider:  GERONIMO ESPARZA [[id number]]    Reason/Clinical Findings:  wound vac    Describe mobility limitations that make leaving home difficult:  post op    Nursing/Therapeutic Services Requested:  Skilled Nursing    Skilled nursing orders:  Wound vac application and instruction    Frequency:  1 Week 1               Discharge Diet:   As tolerated  Activity at Discharge:   As tolerated  Follow-up Appointments  1 wks

## 2020-08-27 NOTE — OP NOTE
08/27/20    Surgeon: Padmaja Ye MD    Surgical        Preoperative diagnosis: Perirectal abscess [K61.1]    Post-Op Diagnosis Codes:     * Perirectal abscess [K61.1]    Procedure: INCISION AND DRAINAGE OF retrorectal dehiscence abcess with drain placement and irrigation, * Panel 2 does not exist *    Estimated Blood Loss: minimal    Specimens:   Specimens     ID Source Type Tests Collected By Collected At Frozen?      1 Pelvis Body Fluid · BODY FLUID CULTURE   Padmaja Ye MD 8/14/20 1301      Description: pelvis abcess culture          Order Name Source Comment Collection Info Order Time   BODY FLUID CULTURE Pelvis  Collected By: Padmaja Ye MD 8/14/2020  1:01 PM       Indication:  Carole Weaver is a 40 y.o. female who comes in with  Retrorectal space abscess  Patient understands risks, benefits,and alternatives wishes to proceed.      Procedure Details: Patient was brought to the operating room.  SCDs in place.  Patient is getting antibiotics on the floor.  After adequate anesthesia was achieved, patient was placed in candycane lithotomy position.  She was then prepped and draped in sterile fashion.  1% lidocaine with epi quarter percent Marcaine without was used as a local infiltration and a perineum block.  Placed a small Hill-Ramos into the rectum and encountered copious amounts of purulence.  I cultured the purulence.  I found that posteriorly the anastomosis had broken down.  The conduit appeared healthy.  It was the rectum that had broken down.  I irrigated out the space until it came back clear.  I irrigated it with Betadine tinged saline and then with sterile saline.  Each was about a liter.  I then placed a Pezzer catheter into the space and sutured it outside the anus.  Patient was stable throughout the entire case.  All instrument lap counts needle counts are correct.  Patient was taken to recovery.

## 2020-09-03 RX ORDER — DICYCLOMINE HCL 20 MG
TABLET ORAL
Qty: 120 TABLET | Refills: 0 | Status: SHIPPED | OUTPATIENT
Start: 2020-09-03 | End: 2020-09-28

## 2020-09-04 ENCOUNTER — OFFICE VISIT (OUTPATIENT)
Dept: SURGERY | Facility: CLINIC | Age: 40
End: 2020-09-04

## 2020-09-04 VITALS
DIASTOLIC BLOOD PRESSURE: 60 MMHG | WEIGHT: 216.6 LBS | BODY MASS INDEX: 34.81 KG/M2 | HEIGHT: 66 IN | HEART RATE: 102 BPM | OXYGEN SATURATION: 97 % | TEMPERATURE: 97.2 F | SYSTOLIC BLOOD PRESSURE: 110 MMHG

## 2020-09-04 DIAGNOSIS — K65.1 ABDOMINOPELVIC ABSCESS (HCC): Primary | ICD-10-CM

## 2020-09-04 DIAGNOSIS — B36.9 FUNGAL SKIN INFECTION: ICD-10-CM

## 2020-09-04 PROCEDURE — 99024 POSTOP FOLLOW-UP VISIT: CPT | Performed by: COLON & RECTAL SURGERY

## 2020-09-04 RX ORDER — NYSTATIN 100000 [USP'U]/G
POWDER TOPICAL 3 TIMES DAILY
Qty: 30 G | Refills: 0 | Status: SHIPPED | OUTPATIENT
Start: 2020-09-04 | End: 2020-10-04

## 2020-09-04 RX ORDER — FLUCONAZOLE 100 MG/1
200 TABLET ORAL DAILY
Qty: 20 TABLET | Refills: 0 | Status: SHIPPED | OUTPATIENT
Start: 2020-09-04 | End: 2020-09-14

## 2020-09-04 NOTE — PROGRESS NOTES
"Carole Weaver is a 40 y.o. female in for follow up of Abdominopelvic abscess (CMS/HCC)    Fungal skin infection    Ct on wed with cbc   2 episodes of drainage from rectum    Skin breaking out under tape         /60 (BP Location: Right arm, Patient Position: Sitting, Cuff Size: Large Adult)   Pulse 102   Temp 97.2 °F (36.2 °C)   Ht 167.6 cm (66\")   Wt 98.2 kg (216 lb 9.6 oz)   SpO2 97%   Breastfeeding No   BMI 34.96 kg/m²   Body mass index is 34.96 kg/m².      PE:  Physical Exam   Constitutional: She appears well-developed. No distress.   HENT:   Head: Normocephalic and atraumatic.   Abdominal: Soft. She exhibits no distension.   Midline Wound - upper - depth 2.5 cm . Lower 2.6  Pink granulation tissue  Skin - irritation   Musculoskeletal: Normal range of motion.   Neurological: She is alert.   Psychiatric: Thought content normal.         Assessment:   1. Abdominopelvic abscess (CMS/HCC)    2. Fungal skin infection         Plan:    rx for nystatin and diflucan for fungal infection.  Continue with CAT scan.  Continue with wound VAC.  Follow-up 10 days.      "

## 2020-09-08 ENCOUNTER — TELEPHONE (OUTPATIENT)
Dept: SURGERY | Facility: CLINIC | Age: 40
End: 2020-09-08

## 2020-09-08 NOTE — TELEPHONE ENCOUNTER
Patient calling- pharmacy is holding her Rx for Diflucan says it has a category 1 interaction with her xarelto - increased rate of bleeding.

## 2020-09-09 ENCOUNTER — HOSPITAL ENCOUNTER (OUTPATIENT)
Dept: PET IMAGING | Facility: HOSPITAL | Age: 40
Discharge: HOME | End: 2020-09-09
Admitting: INTERNAL MEDICINE

## 2020-09-09 ENCOUNTER — INFUSION (OUTPATIENT)
Dept: ONCOLOGY | Facility: HOSPITAL | Age: 40
End: 2020-09-09

## 2020-09-09 DIAGNOSIS — C20 ADENOCARCINOMA OF RECTUM (HCC): ICD-10-CM

## 2020-09-09 LAB
ALBUMIN SERPL-MCNC: 4 G/DL (ref 3.5–5.2)
ALBUMIN/GLOB SERPL: 1 G/DL (ref 1.1–2.4)
ALP SERPL-CCNC: 123 U/L (ref 38–116)
ALT SERPL W P-5'-P-CCNC: 45 U/L (ref 0–33)
ANION GAP SERPL CALCULATED.3IONS-SCNC: 14.1 MMOL/L (ref 5–15)
AST SERPL-CCNC: 30 U/L (ref 0–32)
BASOPHILS # BLD AUTO: 0.03 10*3/MM3 (ref 0–0.2)
BASOPHILS NFR BLD AUTO: 0.4 % (ref 0–1.5)
BILIRUB SERPL-MCNC: 0.2 MG/DL (ref 0.2–1.2)
BUN SERPL-MCNC: 12 MG/DL (ref 6–20)
BUN/CREAT SERPL: 17.9 (ref 7.3–30)
CALCIUM SPEC-SCNC: 9.4 MG/DL (ref 8.5–10.2)
CEA SERPL-MCNC: 1.32 NG/ML
CHLORIDE SERPL-SCNC: 101 MMOL/L (ref 98–107)
CO2 SERPL-SCNC: 20.9 MMOL/L (ref 22–29)
CREAT BLDA-MCNC: 0.6 MG/DL (ref 0.6–1.3)
CREAT SERPL-MCNC: 0.67 MG/DL (ref 0.6–1.1)
DEPRECATED RDW RBC AUTO: 46.7 FL (ref 37–54)
EOSINOPHIL # BLD AUTO: 0.36 10*3/MM3 (ref 0–0.4)
EOSINOPHIL NFR BLD AUTO: 4.9 % (ref 0.3–6.2)
ERYTHROCYTE [DISTWIDTH] IN BLOOD BY AUTOMATED COUNT: 13.2 % (ref 12.3–15.4)
GFR SERPL CREATININE-BSD FRML MDRD: 97 ML/MIN/1.73
GLOBULIN UR ELPH-MCNC: 3.9 GM/DL (ref 1.8–3.5)
GLUCOSE SERPL-MCNC: 100 MG/DL (ref 74–124)
HCT VFR BLD AUTO: 43.7 % (ref 34–46.6)
HGB BLD-MCNC: 14.1 G/DL (ref 12–15.9)
IMM GRANULOCYTES # BLD AUTO: 0.02 10*3/MM3 (ref 0–0.05)
IMM GRANULOCYTES NFR BLD AUTO: 0.3 % (ref 0–0.5)
LYMPHOCYTES # BLD AUTO: 0.94 10*3/MM3 (ref 0.7–3.1)
LYMPHOCYTES NFR BLD AUTO: 12.7 % (ref 19.6–45.3)
MCH RBC QN AUTO: 30.7 PG (ref 26.6–33)
MCHC RBC AUTO-ENTMCNC: 32.3 G/DL (ref 31.5–35.7)
MCV RBC AUTO: 95.2 FL (ref 79–97)
MONOCYTES # BLD AUTO: 0.54 10*3/MM3 (ref 0.1–0.9)
MONOCYTES NFR BLD AUTO: 7.3 % (ref 5–12)
NEUTROPHILS NFR BLD AUTO: 5.5 10*3/MM3 (ref 1.7–7)
NEUTROPHILS NFR BLD AUTO: 74.4 % (ref 42.7–76)
NRBC BLD AUTO-RTO: 0 /100 WBC (ref 0–0.2)
PLATELET # BLD AUTO: 323 10*3/MM3 (ref 140–450)
PMV BLD AUTO: 9.3 FL (ref 6–12)
POTASSIUM SERPL-SCNC: 3.7 MMOL/L (ref 3.5–4.7)
PROT SERPL-MCNC: 7.9 G/DL (ref 6.3–8)
RBC # BLD AUTO: 4.59 10*6/MM3 (ref 3.77–5.28)
SODIUM SERPL-SCNC: 136 MMOL/L (ref 134–145)
WBC # BLD AUTO: 7.39 10*3/MM3 (ref 3.4–10.8)

## 2020-09-09 PROCEDURE — 80053 COMPREHEN METABOLIC PANEL: CPT

## 2020-09-09 PROCEDURE — 71260 CT THORAX DX C+: CPT

## 2020-09-09 PROCEDURE — 74177 CT ABD & PELVIS W/CONTRAST: CPT

## 2020-09-09 PROCEDURE — 25010000002 IOPAMIDOL 61 % SOLUTION: Performed by: INTERNAL MEDICINE

## 2020-09-09 PROCEDURE — 85025 COMPLETE CBC W/AUTO DIFF WBC: CPT

## 2020-09-09 PROCEDURE — 0 DIATRIZOATE MEGLUMINE & SODIUM PER 1 ML: Performed by: INTERNAL MEDICINE

## 2020-09-09 PROCEDURE — 82565 ASSAY OF CREATININE: CPT

## 2020-09-09 PROCEDURE — 82378 CARCINOEMBRYONIC ANTIGEN: CPT | Performed by: INTERNAL MEDICINE

## 2020-09-09 PROCEDURE — 36415 COLL VENOUS BLD VENIPUNCTURE: CPT

## 2020-09-09 RX ADMIN — IOPAMIDOL 85 ML: 612 INJECTION, SOLUTION INTRAVENOUS at 08:33

## 2020-09-09 RX ADMIN — DIATRIZOATE MEGLUMINE AND DIATRIZOATE SODIUM 30 ML: 660; 100 LIQUID ORAL; RECTAL at 07:58

## 2020-09-14 ENCOUNTER — OFFICE VISIT (OUTPATIENT)
Dept: SURGERY | Facility: CLINIC | Age: 40
End: 2020-09-14

## 2020-09-14 VITALS
BODY MASS INDEX: 34.39 KG/M2 | WEIGHT: 214 LBS | HEIGHT: 66 IN | DIASTOLIC BLOOD PRESSURE: 82 MMHG | HEART RATE: 110 BPM | TEMPERATURE: 97.5 F | SYSTOLIC BLOOD PRESSURE: 120 MMHG | OXYGEN SATURATION: 98 %

## 2020-09-14 DIAGNOSIS — L02.91 SOFT TISSUE ABSCESS: Primary | ICD-10-CM

## 2020-09-14 PROCEDURE — 99024 POSTOP FOLLOW-UP VISIT: CPT | Performed by: COLON & RECTAL SURGERY

## 2020-09-14 RX ORDER — AMOXICILLIN AND CLAVULANATE POTASSIUM 875; 125 MG/1; MG/1
1 TABLET, FILM COATED ORAL EVERY 12 HOURS
Qty: 20 TABLET | Refills: 0 | Status: SHIPPED | OUTPATIENT
Start: 2020-09-14 | End: 2020-09-24

## 2020-09-14 NOTE — PROGRESS NOTES
"Carole Weaver is a 40 y.o. female in for follow up of No diagnosis found.  Sat distal end of wound started to get red and tender  Smell worse  No more drainage from rectum       /82 (BP Location: Left arm, Patient Position: Sitting, Cuff Size: Large Adult)   Pulse 110   Temp 97.5 °F (36.4 °C)   Ht 167.6 cm (66\")   Wt 97.1 kg (214 lb)   SpO2 98%   Breastfeeding No   BMI 34.54 kg/m²   Body mass index is 34.54 kg/m².    PE:  Physical Exam  Constitutional:       General: She is not in acute distress.     Appearance: She is well-developed.   HENT:      Head: Normocephalic and atraumatic.   Abdominal:      General: There is no distension.      Palpations: Abdomen is soft.      Comments: Lower abd wound at distal end of granulation tissue  Erythema.      Musculoskeletal: Normal range of motion.   Neurological:      Mental Status: She is alert.   Psychiatric:         Thought Content: Thought content normal.         Assessment: No diagnosis found.     Plan:opened distal wound at area of erythema.  washed with betadine. Numbed with lidocaine 1 %. Open with scalpel along incision and encountered purulence.  Washed out wiht betadine tinged saliene.  Packed with guaze. rx abx  rtc 10 d           "

## 2020-09-16 ENCOUNTER — OFFICE VISIT (OUTPATIENT)
Dept: ONCOLOGY | Facility: CLINIC | Age: 40
End: 2020-09-16

## 2020-09-16 ENCOUNTER — INFUSION (OUTPATIENT)
Dept: ONCOLOGY | Facility: HOSPITAL | Age: 40
End: 2020-09-16

## 2020-09-16 ENCOUNTER — APPOINTMENT (OUTPATIENT)
Dept: LAB | Facility: HOSPITAL | Age: 40
End: 2020-09-16

## 2020-09-16 VITALS
HEART RATE: 120 BPM | RESPIRATION RATE: 16 BRPM | SYSTOLIC BLOOD PRESSURE: 127 MMHG | DIASTOLIC BLOOD PRESSURE: 96 MMHG | TEMPERATURE: 97.7 F | HEIGHT: 65 IN | BODY MASS INDEX: 34.55 KG/M2 | WEIGHT: 207.4 LBS | OXYGEN SATURATION: 96 %

## 2020-09-16 DIAGNOSIS — E87.6 HYPOKALEMIA: ICD-10-CM

## 2020-09-16 DIAGNOSIS — D68.51 HETEROZYGOUS FACTOR V LEIDEN MUTATION (HCC): ICD-10-CM

## 2020-09-16 DIAGNOSIS — C20 ADENOCARCINOMA OF RECTUM (HCC): Primary | ICD-10-CM

## 2020-09-16 DIAGNOSIS — R91.8 PULMONARY NODULES: ICD-10-CM

## 2020-09-16 DIAGNOSIS — C20 ADENOCARCINOMA OF RECTUM (HCC): ICD-10-CM

## 2020-09-16 LAB
ALBUMIN SERPL-MCNC: 3.9 G/DL (ref 3.5–5.2)
ALBUMIN/GLOB SERPL: 0.9 G/DL (ref 1.1–2.4)
ALP SERPL-CCNC: 143 U/L (ref 38–116)
ALT SERPL W P-5'-P-CCNC: 95 U/L (ref 0–33)
ANION GAP SERPL CALCULATED.3IONS-SCNC: 14 MMOL/L (ref 5–15)
AST SERPL-CCNC: 55 U/L (ref 0–32)
BASOPHILS # BLD AUTO: 0.02 10*3/MM3 (ref 0–0.2)
BASOPHILS NFR BLD AUTO: 0.3 % (ref 0–1.5)
BILIRUB SERPL-MCNC: 0.3 MG/DL (ref 0.2–1.2)
BUN SERPL-MCNC: 10 MG/DL (ref 6–20)
BUN/CREAT SERPL: 14.1 (ref 7.3–30)
CALCIUM SPEC-SCNC: 9.8 MG/DL (ref 8.5–10.2)
CEA SERPL-MCNC: 1.32 NG/ML
CHLORIDE SERPL-SCNC: 101 MMOL/L (ref 98–107)
CO2 SERPL-SCNC: 20 MMOL/L (ref 22–29)
CREAT SERPL-MCNC: 0.71 MG/DL (ref 0.6–1.1)
DEPRECATED RDW RBC AUTO: 44.3 FL (ref 37–54)
EOSINOPHIL # BLD AUTO: 0.32 10*3/MM3 (ref 0–0.4)
EOSINOPHIL NFR BLD AUTO: 4.6 % (ref 0.3–6.2)
ERYTHROCYTE [DISTWIDTH] IN BLOOD BY AUTOMATED COUNT: 12.9 % (ref 12.3–15.4)
GFR SERPL CREATININE-BSD FRML MDRD: 91 ML/MIN/1.73
GLOBULIN UR ELPH-MCNC: 4.4 GM/DL (ref 1.8–3.5)
GLUCOSE SERPL-MCNC: 124 MG/DL (ref 74–124)
HCT VFR BLD AUTO: 46.4 % (ref 34–46.6)
HGB BLD-MCNC: 15.4 G/DL (ref 12–15.9)
IMM GRANULOCYTES # BLD AUTO: 0.01 10*3/MM3 (ref 0–0.05)
IMM GRANULOCYTES NFR BLD AUTO: 0.1 % (ref 0–0.5)
LYMPHOCYTES # BLD AUTO: 0.94 10*3/MM3 (ref 0.7–3.1)
LYMPHOCYTES NFR BLD AUTO: 13.5 % (ref 19.6–45.3)
MCH RBC QN AUTO: 31 PG (ref 26.6–33)
MCHC RBC AUTO-ENTMCNC: 33.2 G/DL (ref 31.5–35.7)
MCV RBC AUTO: 93.5 FL (ref 79–97)
MONOCYTES # BLD AUTO: 0.42 10*3/MM3 (ref 0.1–0.9)
MONOCYTES NFR BLD AUTO: 6 % (ref 5–12)
NEUTROPHILS NFR BLD AUTO: 5.25 10*3/MM3 (ref 1.7–7)
NEUTROPHILS NFR BLD AUTO: 75.5 % (ref 42.7–76)
NRBC BLD AUTO-RTO: 0 /100 WBC (ref 0–0.2)
PLATELET # BLD AUTO: 360 10*3/MM3 (ref 140–450)
PMV BLD AUTO: 8.7 FL (ref 6–12)
POTASSIUM SERPL-SCNC: 4.1 MMOL/L (ref 3.5–4.7)
PROT SERPL-MCNC: 8.3 G/DL (ref 6.3–8)
RBC # BLD AUTO: 4.96 10*6/MM3 (ref 3.77–5.28)
SODIUM SERPL-SCNC: 135 MMOL/L (ref 134–145)
WBC # BLD AUTO: 6.96 10*3/MM3 (ref 3.4–10.8)

## 2020-09-16 PROCEDURE — 96523 IRRIG DRUG DELIVERY DEVICE: CPT

## 2020-09-16 PROCEDURE — 80053 COMPREHEN METABOLIC PANEL: CPT

## 2020-09-16 PROCEDURE — 25010000003 HEPARIN LOCK FLUSH PER 10 UNITS: Performed by: INTERNAL MEDICINE

## 2020-09-16 PROCEDURE — 99215 OFFICE O/P EST HI 40 MIN: CPT | Performed by: INTERNAL MEDICINE

## 2020-09-16 PROCEDURE — 85025 COMPLETE CBC W/AUTO DIFF WBC: CPT

## 2020-09-16 PROCEDURE — 82378 CARCINOEMBRYONIC ANTIGEN: CPT | Performed by: INTERNAL MEDICINE

## 2020-09-16 RX ORDER — HEPARIN SODIUM (PORCINE) LOCK FLUSH IV SOLN 100 UNIT/ML 100 UNIT/ML
500 SOLUTION INTRAVENOUS ONCE
Status: COMPLETED | OUTPATIENT
Start: 2020-09-16 | End: 2020-09-16

## 2020-09-16 RX ADMIN — Medication 500 UNITS: at 09:14

## 2020-09-16 NOTE — PROGRESS NOTES
REASON FOR FOLLOW UP:     1. Rectal cancer, rectal ultrasound examination in July 2019 reported T3N0 disease without lymphadenopathy. She does have small pulmonary nodule 6-7 mm and 2 mm with indeterminate feature. There is no solid evidence of metastatic disease otherwise. Patient has stage IIA (T3N0M0) disease.  2. The patient is heterozygous factor V Leiden, was on prophylactic anticoagulation with Lovenox 40 mg daily given her increased risk of thrombosis with MediPort and GI malignancy.   3. PET scan on 8/8/2019 reported an 8 mm hypermetabolic right upper lobe pulmonary nodule, which is suspicious for metastatic as well.    4. Patient had a PowerPort placement on the left upper chest by Dr. Joseph on 8/9/2019.  5. Patient was started on concurrent infusional 5-FU chemoradiation therapy on 8/12/2019, with planned complete surgical resection and further adjuvant chemotherapy with intention to cure the disease.   6. Patient underwent surgical resection of the primary rectal cancer by Dr. Ye on 12/2/2019.  Pathology evaluation reported residual T3N1 disease stage IIIb.  7. Diarrhea related to therapy and radiation.   8. Patient was started cycle 1 day 1 of adjuvant FOLFOX 6 on 1/23/2020.  9. On 2/5/2020 FOLFOX 6 cycle 2 delayed secondary to neutropenia.  Patient was given 3 days of Granix injection.  After cycle #2 we planned 3 days of Granix with each cycle.   10. Patient also intolerant of oral iron.  Patient received 2 doses of IV injectafer on 02/05/2020 and 02/12/2020.   11. 02/12/2020 Proceed with cycle #2 of FOLFOX 6 with 3 days of Granix.  12. On 3/11/2020 cycle 4 postponed secondary to abdominal pain and occasional low-grade fevers.  CT scans ordered.  13. Cycle #4 resumed after CT scan revealed no evidence of disease.  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 Ye. patient has no fever.  Continue to observe.   14. Grade 3  hand-foot syndrome secondary to 5-FU after cycle #7 FOLFOX 6 chemotherapy, prompting ER visit.  Also has worsening peripheral neuropathy.   15. Cycle #8 FOLFOX 6 was given on 5/13/2020, with 50% dose reduction for both 5-FU and oxaliplatin, and also examination of bolus 5-FU.   16. PET scan examination on 5/21/2020 reported no evidence of metastatic disease in the chest abdomen pelvis.  Stable 6 mm RUL pulmonary nodule.  17. On 5/27/2020, I discussed with the patient that we can discontinue chemotherapy at this time.   18. Patient had a surgical procedure for low anterior colon resection, coloanal anastomosis on 8/3/2020.  19. CT scan for chest abdomen pelvis on 9/9/2020 reported a new 8 mm noncalcified pulmonary nodule in the left lower lobe of the lung.  Otherwise stable right upper lobe 6 mm pulmonary nodule, and no evidence of disease recurrence in the abdomen or pelvis.    HISTORY OF PRESENT ILLNESS:  The patient is a 40 y.o. female with the above medical history who presents today for follow-up evaluation after recent CT scan examination for chest abdomen pelvis obtained on 9/9/2020.    Patient last time was here in late June 2020.  Since that time she had surgical procedure for low anterior colon resection, coloanal anastomosis on 8/3/2020.  She later developed a perirectal abscess, required surgical drainage on 8/14/2020.  She was discharged on 8/27/2020.    Patient reports to me she still has lower abdominal wall vacuum suction in place.  She denies fever sweating chills.  Performance status is ECOG 1.  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  She does have visiting nurse come to the home for wound care.    CT scan for chest abdomen pelvis on 9/9/2020 reported a new 8 mm noncalcified pulmonary nodule in the left lower lobe.  Otherwise stable right upper lobe 6 mm pulmonary nodule, and no evidence of disease recurrence in the abdomen or pelvis.     Laboratory study today on  9/16/2020 reported elevated AST 55, ALT 95, alk phosphatase 143 but normal total bilirubin 0.3.  Chemistry lab otherwise unremarkable.  She has completed normal CBC.      Her chemistry lab a week ago on 9/9/2020 reported only marginally elevated ALT 45 and alk phosphatase 123 but normal AST 30 and a total bilirubin 0.2.  Normal electrolytes liver function panel and glucose, together with normal CBC.    Patient reports no new medication except Augmentin, no recent illness.      Past Medical History:   Diagnosis Date   • Abnormal Pap smear of cervix 02/02/1998    JULIUS I   • Anemia in pregnancy    • Anxiety    • Bilateral epiphora 04/2020   • Bilateral hand swelling 05/02/2020    SEEN AT [facility] ER   • Cervical lymphadenitis 06/06/2012    SEEN AT [facility] ER   • Chemotherapy induced neutropenia (CMS/HCC) 04/2020   • Chemotherapy-induced nausea 02/2020   • Chemotherapy-induced thrombocytopenia 05/2020   • Chronic diarrhea    • Colon polyps     FOLLOWED BY DR. GERONIMO ESPARZA   • Cystitis 04/24/2020    WITH DEHYDRATION, ADMITTED TO [facility]   • Drug-induced peripheral neuropathy (CMS/HCC) 05/2020   • Fistula of intestine    • GERD (gastroesophageal reflux disease)    • Hand foot syndrome 05/2020   • Heart murmur     IN CHILDHOOD   • Hematochezia    • Hemorrhoids    • Heterozygous factor V Leiden mutation (CMS/HCC)     DX 8-2-2019   • History of anemia    • History of chemotherapy 2019    FOLLOWED BY DR. ALEXANDRU HUNT   • History of gestational diabetes    • History of pre-eclampsia    • History of radiation therapy 2019    FOLLOWED BY DR. JAVON LEWIS   • History of TB skin testing 01/17/2009    TB Skin Test   • HPV in female 1998   • Hypokalemia 09/2019   • Hypomagnesemia 09/2019   • IBS (irritable bowel syndrome)    • Ileostomy in place (CMS/HCC)     FOLLOWED BY DR. GERONIMO ESPARZA   • Infected insect bite of neck 05/27/2016    SEEN AT [facility]   • Kidney stones 08/09/2007    SEEN AT [facility] ER   • Lump of right breast     SWOLLEN LYMPH  NODE   • Monopolar depression (CMS/HCC)    • On anticoagulant therapy    • PIH (pregnancy induced hypertension)    • Pulmonary embolism without acute cor pulmonale (CMS/HCC) 09/20/2019    X 3; ADMITTED TO [facility]   • Pulmonary nodule, right 2020   • Rectal cancer (CMS/HCC) 2019    INVASIVE MODERATELY DIFFERENTIATED ADENOCARCINOMA, CHEMO AND XRT FINISHED 2019   • Right ureteral stone 10/01/2019    SEEN AT [facility] ER   • SAB (spontaneous ) 1996     A2-1 INDUCED   • Sciatica    • Sepsis due to cellulitis (CMS/HCC) 2002    LEFT GREAT TOE, ADMITTED TO [facility]   • Tachycardia 2020   • Urinary urgency 2020     Past Surgical History:   Procedure Laterality Date   • CHOLECYSTECTOMY N/A 10/10/2003    LAPAROSCOPIC WITH CHOLANGIOGRAM, DR. JAMEY TALAVERA AT [facility]   • COLON RESECTION N/A 2019    Procedure: laparoscopic low anterior colon resection with mobilization of splenic flexure and diverting loop ileostomy: ERAS;  Surgeon: Padmaja Esparza MD;  Location: [facility];  Service: General   • COLON RESECTION N/A 8/3/2020    Procedure: LOW ANTERIOR COLON RESECTION, COLOANAL ANASTOMOSIS, MOBILIZATION SPLENIC FLEXURE;  Surgeon: Padmaja Esparza MD;  Location: [facility] MAIN OR;  Service: General;  Laterality: N/A;   • COLONOSCOPY N/A 7/15/2020    PATENT ANASTAMOSIS IN MID RECTUM, RESCOPE IN 1 YR, DR. PADMAJA ESPARZA AT [facility]   • COLONOSCOPY W/ POLYPECTOMY N/A 2019    15 MM TUBULOVILLOUS ADENOMA POLYP IN HEPATIC FLEXURE, 20 MMTUBULOVILLOUS ADENOMA WITH HIGH GRADE DYSPLASIA IN RECTOSIGMOID, 4 CM MASS IN MID RECTUM, PATH: INVASIVE MODERATELY DIFFERENTIATED ADENOCARCINOMA, DR. JENNIFER LI AT [facility]   • DILATATION AND EVACUATION N/A    • ENDOSCOPY N/A 2019    LA GRADE A ESOPHAGITIS, GASTRITIS, ALL BIOPSIES BENIGN, DR. JENNIFER LI AT [facility]   • INCISION AND DRAINAGE PERIRECTAL ABSCESS N/A 2020    Procedure: INCISION AND DRAINAGE OF retrorectal  dehiscence abcess with drain placement and irrigation;  Surgeon: Geronimo Ye MD;  Location: [facility] MAIN OR;  Service: General;  Laterality: N/A;   • PAP SMEAR N/A 01/24/2014   • SIGMOIDOSCOPY N/A 7/24/2019    INFILTRATIVE PARTIALLY OBSTRUCTING LARGE RECTAL CANCER, AREA TATOOED, DR. GERONIMO YE AT [facility]   • SIGMOIDOSCOPY N/A 11/23/2019    AN ULCERATED PARTIALLY OBSTRUCTING MASS IN MID RECTUM, AREA TATTOOED, DR. GERONIMO YE AT [facility]   • TRANSRECTAL ULTRASOUND N/A 7/24/2019    Procedure: ULTRASOUND TRANSRECTAL;  Surgeon: Geronimo Ye MD;  Location: [facility] ENDOSCOPY;  Service: General   • URETEROSCOPY LASER LITHOTRIPSY WITH STENT INSERTION Right 10/30/2019    DR. ESTUARDO BEASLEY AT [place]   • VAGINAL DELIVERY N/A 09/18/1998   • VENOUS ACCESS DEVICE (PORT) INSERTION Left 8/9/2019    Procedure: INSERTION VENOUS ACCESS DEVICE;  Surgeon: Sj Joseph MD;  Location: [facility] OR [facility];  Service: General   • WISDOM TOOTH EXTRACTION Bilateral 1993       HEMATOLOGIC/ONCOLOGIC HISTORY:      The patient reports she has intermittent rectal bleeding and urgency, mucous with her stool, starting sometime in 2016. At that time she was referred to GI service, and was diagnosed of irritable bowel syndrome. Nevertheless she had worsening urgency for bowel movement, and had a couple of incidents losing control of stool. She was recently seen by Roland Thorpe MD again and had colonoscopy and EGD exam on 07/08/2019. She was found having a circumferential rectal mass. According to the procedure note, the patient had a fungating circumferential bleeding 4 cm mass in the middle rectum, at distance between 13 cm and 17 cm from the anus. Mass was causing partial obstruction. There were also colon polyps found at the hepatic flexure and also at the rectosigmoid junction 23 cm from the anus. Both were resected and retrieved. EGD examination reported grade A esophagitis at the GE junction and patchy discontinuous erythema and aggravation of  the mucosa without bleeding in the stomach body. There is normal mucosa of the duodenum. Pathology evaluation from this procedure reported moderately differentiated adenocarcinoma involving the rectal mass. The rectal sigmoid junction polyp was tubular/tubulovillous adenoma with high grade dysplasia negative for invasive malignancy. The hepatic flexure polyp was also tubular/tubulovillous adenoma negative for high grade dysplasia or malignancy. The biopsy from the esophagus reports squamocolumnar mucosa with inflammatory changes suggestive of mild reflux esophagitis, negative for interstitial metaplasia. Gastric biopsy was benign and duodenal biopsy was also benign. There is no mention of H-pylori.     Patient was subsequently referred to colorectal surgeon Padmaja Ye MD for further evaluation. The patient had CT scan examination for chest, abdomen and pelvis requested by Dr. Ye and were done on 07/13/2019. The study reported no evidence of lymphadenopathy in the abdomen and pelvis. There was wall thickening of the rectosigmoid junction. Normal spleen, pancreas, adrenal glands and kidneys. There was fatty liver infiltration but no focal lymphatic lesions. There was a small 6-7 mm noncalcified nodule in the right upper lobe and a tiny 3 mm subpleural nodule in the right middle lobe. No mediastinal or hilar lymphadenopathy.     Dr. Ye performed staging rectal ultrasound examination. This study reported tumor penetrating through the muscularis propria as T3 disease; there were no lymph nodes identified.    She had a hospitalization in late September 2019 because of newly discovered pulmonary emboli.  She was on prophylactic Lovenox prior to the incident of PE.  Now she is on full dose Xarelto anticoagulation.  Patient reports no further chest pain dyspnea.  She denies bleeding or bruising.  During her hospitalization, she was seen by Dr. Ye, who plans to have surgery 8 to 12 weeks after finishing radiation  therapy.  She finished her radiation on 9/19/2019.     I noticed patient also presented to the emergency room on 10/1/2019 complaining of right flank area pain.  I reviewed the images studies and indeed she has a very small 1 to 2 mm obstructing kidney stone in the UV junction.  Patient is still symptomatic with some pains and dysuria.  She denies fever sweating or chills.    Laboratory study on 10/7/2019 reported normal CBC including hemoglobin 13.1, platelets 301,000, WBC 6170 and ANC 4900 lymphocytes 590 monocytes 480.      The nurse reported malfunction of port-a-catheter on 10/7/2019.  Port study on 10/8/2019 showed fibrin sheath around the distal aspect of the Mediport catheter in the SVC. This does not appear to hinder injection, but does prevent aspiration at this time.    Patient underwent surgical resection of the colon on 12/2/2019 with Dr. Ye.  Pathology evaluation reported residual T3 disease, also 1 out of 14 lymph nodes positive for malignancy.  So this patient in either had at least stage IIIb disease (T3 N1 M0?).      Adjuvant chemotherapy FOLFOX 6 will be started on 1/22/2020.  Laboratory study reported iron saturation 10%, free iron 31 TIBC 319 and ferritin 168.  Her hemoglobin was 11.8, WBC 5600, and platelets 347,000.    Patient was here on 02/12/2020 for cycle 2 of her FOLFOX.  This is delayed x1 week secondary to neutropenia.  The patient ultimately received 3 days worth of Neupogen with recovery of her blood counts.  Of note, the patient struggled with significant nausea without any episodes of vomiting following cycle #1 of chemotherapy resulting in significant weight loss.  She is up 12 pounds over the last week as her appetite has normalized.  We will add Emend to her care plan.    She is having several loose stools per her colostomy and has been hesitant to take Imodium due to prior history of constipation.  I encouraged her to try this with a maximum of 8 tablets/day.  She denies any  infectious symptoms including fevers or chills.  No excess bleeding or bruising noted.  She had the expected cold sensitivity related to the Oxaliplatin for about 3 days following treatment.    Labs from 02/12/2020 demonstrated total white blood cell count of 5.14, ANC of 3.06, hemoglobin of 11.2, platelets of 211,000.  She was found to be iron deficient last week and is intolerant to oral iron secondary to GI distress.  For this reason, she initiated IV iron therapy with Injectafer last week.  She had received her second dose 02/12/2020    Patient has normalized hemoglobin 12.2 on 2/26/2020.    She reported on 5/5/2020 she had a recent visit to the emergency department for what was suspected to be an allergic reaction.  She called our on-call service on Saturday with reports of hand and face swelling.  She was instructed to proceed to the emergency department at which time she was assessed and prescribed a Medrol Dosepak.  She had just completed her 5-FU and was unhooked on Friday, 5/3/2020.  Her symptoms have improved.  She does report persistent hyperpigmentation and mild swelling of the palms of the hands but this is much improved.  It was her right hand specifically that was swollen.  Her facial swelling has resolved.  She continues on Cefdinir nd since has 1 day remaining, she was prescribed cefdinir for a UTI requiring hospitalization at the end of April.  Reports no new symptoms.  Her labs are stable.  She is scheduled for treatment again.    Patient states at this time she is not tolerating her chemotherapy well.     Patient seen in the emergency department on 5/2/2020 for what was suspected to be an allergic reaction.  She called our on-call service on Saturday explaining that she was experiencing hand and face swelling.  She was instructed to proceed to the emergency department at which time she was assessed and prescribed a Medrol Dosepak.  She had just completed her 5-FU and was unhooked on Friday,  5/1/2020.      She reports since her ED visit on 5/2/2020 her symptoms have not improved. Her hands and feet were swollen upon presenting to the ED and she could barely make a fist. She states that she feels so swollen she is not able to stand it. She states that her skin on the hands and feet are peeling extensively as well, besides changing color to more dark.     She also reports significant fatigue after her first week of the 5-FU treatment but she expected this side effect. She also notices significant increase in her neuropathy to the point that she is not able to even walk around in her home without her house slippers due to irritation from her rugs. She denies and associated nausea or vomiting at this time. She also has episodes of epistaxis every day after the chemotherapy cycle #7.  She does report working full-time during the week of chemotherapy.      Laboratory studies, 5/13/2020, show moderate thrombocytopenia platelets 123,000, low normal WBC 4140 including ANC 2720 and normal hemoglobin 13.4.  Because significant hand-foot syndrome, will decrease both 5-FU and oxaliplatin by 50%, and   eliminate bolus dose of 5-FU.    On 5/21/2020 patient had a PET scan performed which showed no convincing evidence of residual disease in abdomen or pelvis, no metastatic disease within the chest or neck.     Cycle #8 FOLFOX 6 was given on 5/13/2020, with 50% dose reduction for both 5-FU and oxaliplatin, and also examination of bolus 5-FU.  She states on 5/27/2020 that with the recent reduction of the chemotherapy she feels significantly better. She has more energy and is able to do her daily routine.      PET scan examination on 5/21/2020 reported no evidence of metastatic disease in chest abdomen pelvis.  There was a stable 6 mm right upper lobe pulmonary nodule.    Laboratory studies on 5/27/2020 showed mild leukocytopenia WBC 3720 but a normal ANC 2250 and lymphocytes 630.  Normal platelets 163,000 and hemoglobin  12.6.  Chemistry lab reported normal renal function, liver function panel and a electrolytes, elevated glucose 164.    Laboratory studies 6/24/2020, showed normal hemoglobin 13.4 but macrocytosis .9.  Normal platelets 210,000 and WBC 5870.  She had normal CMP.     MEDICATIONS    Current Outpatient Medications:   •  amoxicillin-clavulanate (Augmentin) 875-125 MG per tablet, Take 1 tablet by mouth Every 12 (Twelve) Hours for 10 days., Disp: 20 tablet, Rfl: 0  •  clonazePAM (KlonoPIN) 1 MG tablet, TAKE 1 TABLET BY MOUTH 3 (THREE) TIMES A DAY AS NEEDED FOR ANXIETY OR SEIZURES., Disp: 90 tablet, Rfl: 0  •  dicyclomine (BENTYL) 20 MG tablet, TAKE 1 TABLET BY MOUTH EVERY 6 HOURS AS NEEDED, Disp: 120 tablet, Rfl: 0  •  escitalopram (LEXAPRO) 20 MG tablet, Take 1 tablet by mouth Daily., Disp: 90 tablet, Rfl: 3  •  famotidine (PEPCID) 20 MG tablet, TAKE 1 TABLET BY MOUTH TWICE A DAY (Patient taking differently: Take 20 mg by mouth Every Evening.), Disp: 180 tablet, Rfl: 1  •  HYDROcodone-acetaminophen (NORCO) 7.5-325 MG per tablet, Take 1 tablet by mouth Every 6 (Six) Hours As Needed for Moderate Pain ., Disp: 240 tablet, Rfl: 0  •  Loratadine (CLARITIN) 10 MG capsule, Take 10 mg by mouth Every Evening., Disp: , Rfl:   •  methocarbamol (ROBAXIN) 500 MG tablet, Take 1 tablet by mouth Every 8 (Eight) Hours., Disp: 46 tablet, Rfl: 0  •  nystatin (MYCOSTATIN) 855738 UNIT/GM powder, Apply  topically to the appropriate area as directed 3 (Three) Times a Day for 30 days. Apply to affected area 3 times daily, Disp: 30 g, Rfl: 0  •  ondansetron (ZOFRAN) 8 MG tablet, Take 1 tablet by mouth 3 (Three) Times a Day As Needed for Nausea or Vomiting., Disp: 60 tablet, Rfl: 5  •  prochlorperazine (COMPAZINE) 10 MG tablet, Take 1 tablet by mouth Every 6 (Six) Hours As Needed for Nausea or Vomiting., Disp: 30 tablet, Rfl: 2  •  rivaroxaban (XARELTO) 20 MG tablet, Take 1 tablet by mouth Daily. Start after finishing starter pack. (Patient  taking differently: Take 20 mg by mouth Daily. HOLDING 2 DAYS PRIOR TO SURGERY), Disp: 30 tablet, Rfl: 11  No current facility-administered medications for this visit.     ALLERGIES:   No Known Allergies    SOCIAL HISTORY:       Social History     Tobacco Use   • Smoking status: Heavy Tobacco Smoker     Packs/day: 1.00     Years: 24.00     Pack years: 24.00     Types: Electronic Cigarette, Cigarettes   • Smokeless tobacco: Never Used   • Tobacco comment: LAST CIGARETTE 8/12/2020   Substance Use Topics   • Alcohol use: Not Currently   • Drug use: No         FAMILY HISTORY:   Mother has positive factor V Leiden mutation, although she did not have thrombosis, mother also is coronary disease with stenting, she also is occasional bruising.  Maternal grandmother had DVT, she had multiple surgical procedures.  Patient mother's half-brother had metastatic colon cancer at diagnosis in his 50s.  Maternal great aunt has breast cancer.  Patient will follow his skin cancer in his 60s, exclusive.    REVIEW OF SYSTEMS:  Review of Systems   Constitutional: Negative for appetite change, chills, diaphoresis, fatigue and fever.   HENT: Negative for congestion, hearing loss, mouth sores, nosebleeds and trouble swallowing.    Eyes: Negative for photophobia and visual disturbance.   Respiratory: Negative for cough, chest tightness and shortness of breath.    Cardiovascular: Negative for chest pain, palpitations and leg swelling.   Gastrointestinal: Negative for abdominal distention, abdominal pain, anal bleeding, constipation, diarrhea and nausea.   Endocrine: Negative for cold intolerance and heat intolerance.   Genitourinary: Negative for frequency and hematuria.        See HPI    Musculoskeletal: Negative for arthralgias, back pain and joint swelling.   Skin: Negative for color change and rash.   Allergic/Immunologic: Positive for immunocompromised state (Secondary to adjuvant chemotherapy). Negative for environmental allergies and  "food allergies.   Neurological: Negative for dizziness, speech difficulty, numbness and headaches.   Hematological: Negative for adenopathy. Does not bruise/bleed easily.   Psychiatric/Behavioral: Negative for agitation, behavioral problems, confusion and suicidal ideas.          Vitals:    09/16/20 0954   BP: 127/96   Pulse: 120   Resp: 16   Temp: 97.7 °F (36.5 °C)   SpO2: 96%   Weight: 94.1 kg (207 lb 6.4 oz)   Height: 166 cm (65.35\")   PainSc: 0-No pain     Current Status 9/16/2020   ECOG score 0     PHYSICAL EXAM:    CONSTITUTIONAL:  Vital signs reviewed.  Well-developed, well-nourished female, no distress, looks comfortable.  EYES:  Conjunctiva and lids unremarkable.  Pupils equal and round.  EARS,NOSE,MOUTH,THROAT:  Ears appear unremarkable.  Patient wears mask due to the pandemic coronavirus infection.   RESPIRATORY:  Normal respiratory effort.  Lungs clear to auscultation bilaterally.  CARDIOVASCULAR: Regular rhythm and rate.  Normal S1, S2.  No murmurs.  No significant lower extremity edema.  GASTROINTESTINAL: Abdomen no tender.  Ileostomy in the right lower abdomen.  Bowel sounds normal.  Nontender.     LYMPHATIC:  No cervical, supraclavicular lymphadenopathy.  MUSCULOSKELETAL:  Unremarkable gait and station.  No cyanosis or clubbing.  SKIN:  Warm.  No rashes.  Mild skin pigmentation involving her palms.  PSYCHIATRIC:  Normal judgment and insight.  Normal mood and affect.    RECENT LABS:    Lab Results   Component Value Date    WBC 6.96 09/16/2020    HGB 15.4 09/16/2020    HCT 46.4 09/16/2020    MCV 93.5 09/16/2020     09/16/2020     Lab Results   Component Value Date    NEUTROABS 5.25 09/16/2020     Glucose   Date Value Ref Range Status   09/16/2020 124 74 - 124 mg/dL Final     BUN   Date Value Ref Range Status   09/16/2020 10 6 - 20 mg/dL Final     Creatinine   Date Value Ref Range Status   09/16/2020 0.71 0.60 - 1.10 mg/dL Final   09/09/2020 0.60 0.60 - 1.30 mg/dL Final     Comment:     Serial " Number: [id number]:  [id number]     Sodium   Date Value Ref Range Status   09/16/2020 135 134 - 145 mmol/L Final     Potassium   Date Value Ref Range Status   09/16/2020 4.1 3.5 - 4.7 mmol/L Final     Chloride   Date Value Ref Range Status   09/16/2020 101 98 - 107 mmol/L Final     CO2   Date Value Ref Range Status   09/16/2020 20.0 (L) 22.0 - 29.0 mmol/L Final     Calcium   Date Value Ref Range Status   09/16/2020 9.8 8.5 - 10.2 mg/dL Final     Total Protein   Date Value Ref Range Status   09/16/2020 8.3 (H) 6.3 - 8.0 g/dL Final     Albumin   Date Value Ref Range Status   09/16/2020 3.90 3.50 - 5.20 g/dL Final     ALT (SGPT)   Date Value Ref Range Status   09/16/2020 95 (C) 0 - 33 U/L Final     AST (SGOT)   Date Value Ref Range Status   09/16/2020 55 (H) 0 - 32 U/L Final     Alkaline Phosphatase   Date Value Ref Range Status   09/16/2020 143 (H) 38 - 116 U/L Final     Total Bilirubin   Date Value Ref Range Status   09/16/2020 0.3 0.2 - 1.2 mg/dL Final     eGFR Non  Amer   Date Value Ref Range Status   09/16/2020 91 >60 mL/min/1.73 Final     BUN/Creatinine Ratio   Date Value Ref Range Status   09/16/2020 14.1 7.3 - 30.0 Final     Anion Gap   Date Value Ref Range Status   09/16/2020 14.0 5.0 - 15.0 mmol/L Final       IMAGING STUDY:  No new images since PET scan on 5/21/2020.      Assessment/Plan      ASSESSMENT:   1.  Newly diagnosed rectal cancer, rectal ultrasound examination reported T3N0 disease without lymphadenopathy. CT scan of chest, abdomen and pelvis reported no lymphadenopathy in the abdomen, pelvis or chest. She does have small pulmonary nodule 6-7 mm and 2 mm with indeterminate feature. There is no solid evidence of metastatic disease otherwise.   · Based on the CT scan and rectal ultrasound imaging studies, this patient had stage IIA (T3N0M0) disease.    · She had PET scan examination on 8/8/2019 which reported a hypermetabolic right upper lobe pulmonary nodule 6 mm with SUV 5.6.  This is  suspicious for metastatic disease.  This patient may have stage IV disease.   · She initiated concurrent radiation with continuous 5-FU on 8/12/2019.  · Patient finished concurrent chemoradiation therapy.  · Patient underwent surgical resection of the rectal tumor and diverting loop ileostomy on 12/2/2019 with Dr. Ye.  Pathology evaluation reported residual T3 disease, with 1 out of 14 lymph nodes positive for malignancy.  Certainly this patient has at least stage IIIb rectal cancer (T3 N1 M0?)  · On 1/22/2020 adjuvant chemotherapy FOLFOX 6 regimen initiated.    · On 2/5/2022 cycle #2 was delayed secondary to neutropenia.  She was given 3 days of Granix.   · Emend added with cycle 3.  With improved nausea control  · Continuing home Granix x3 days following 5-FU disconnect  · 3/11/2020 due for cycle 4, however, she is experiencing progressive abdominal pain and occasional fevers.   · CT scan performed on 3/13/2020 reveals no evidence of progressive or recurrent disease.  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  · Patient hospitalized 4/24-4/26/20 after cycle 5 of chemotherapy with acute UTI.  CT abdomen/pelvis noting fluid collection in the presacral region having diminished in size compared to CT dated 3/13/2020.  Patient was evaluated by Dr. Ye while in the hospital with further plans to evaluate possible colovaginal fistula following completion of chemotherapy.  Patient did respond to IV antibiotics and discharged home on oral cefdinir.  · 4/29/2020 cycle 6 of FOLFOX.  Urinary symptoms have resolved   · Patient seen in the [facility] ED on 5/2/2020 for what was suspected to be an allergic reaction.  She called our service on Saturday explaining that she was experiencing hand and face swelling.  She was instructed to proceed to the emergency department at which time she was assessed and prescribed a Medrol Dosepak.  She had just completed her 5-FU and was unhooked on Friday, 5/1/2020.  Her symptoms have  resolved in the office on assessment, 5/5/2020.  · The patient had grade 3 hand-foot syndrome based on symptomology.  Patient had cycle #8 of 5-FU on 5/13/2020. Due to her symptoms and poor tolerance to the 5-FU, her treatment dose will be reduced 50% for oxaliplatin and infusional 5-FU.  Bolus 5-FU will be discontinued..  · On 5/13/2020  We discussed further treatment options pending the scan results.  If she has indeed residual disease or metastatic disease, we will switch her to irinotecan plus Avastin or anti-EGFR monoclonal antibody.  She will need a further molecular testing of her tumor samples in that case.  · On 5/21/2020 patient had a PET scan and it showed no evidence of of metastatic disease in the neck, chest, abdomen or pelvis.  There was fluid accumulation/abscess.   · On 5/27/2020 I discussed with the patient that we can discontinue chemotherapy at this time.  She will follow-up with Dr. Ye to discuss a possibility to reverse the ileostomy.  We likely will obtain anther PET scan in 3 to 4 months for reassessment.    · Patient was seen by Dr. Ye on 6/19/2019 for evaluation and to discuss possible take down of her ileostomy.  She is scheduled to have a gastrografin enema on 7/2/2020 to evaluate for a fistula, and then a colonoscopy on 7/15/2020, both done by Dr. Ye.  She states that based on the above imaging and procedure findings, she may have a more extensive surgery done or just have her ileostomy reversed, which would likely be done in August 2020.  This will all be coordinated under the care of Dr. Ye.     · I reviewed scan results with the patient today on 9/16/2020 for the most recent CT scan from 09/09/2020 and also compared it to her previous PET scan examination from 05/21/2020 and also the original PET scan from 08/09/2019 and during the original PET scan there is a small right upper lobe pulmonary nodule 6 mm with SUV 5.6. So in the 05/2020 PET scan the nodule is still there  but activity seems much less when I reviewed myself and this most recent CT scan examination also documented the preexisting small pulmonary nodule however there is a new left lower lobe pulmonary nodule 8 mm in size and I shared with the patient today this nodule is suspicious for metastatic disease. Laboratory studies reported minimal CEA level 1.32 on 09/09/2020. Liver function panel was only marginally abnormal with the ALT 45, the remaining is normal. However laboratory study today showed worsening AST 55, ALT 95 and alkaline phosphatase 143 but is still normal, total bilirubin 0.3.   · So I discussed with the patient recommended to have repeat PET scan examination for assessment because the left lower lobe pulmonary nodule is too small to be biopsied, and if the PET scan reports hypermetabolic lesion, I recommended to have stereotactic radiation therapy. Explained to patient that she is not a really good candidate to have thoracotomy for resection because she still has unhealed wound in the abdomen. I think the stereotactic radiation therapy will be a more feasible choice.  · Laboratory study today on 9/16/2020 reported worsening liver function panel with both elevated AST, ALT and also slightly worsened alkaline phosphatase despite having normal total bilirubin. I recommended to repeat laboratory study for reevaluation. The only new medication she has in the past several days is Augmentin but otherwise no change of condition. She denies any recent viral infection. We will monitor this.     2 .  Pulmonary nodule, hypermetabolic on previous PET scan.   · Her PET scan examination from 8/8/2019 reported a small 8 mm right upper apex pulmonary nodule but hypermetabolic SUV 5.1.  That was too small to be biopsied, however there was always a possibility of metastatic disease considering that high activity despite a such a small nodule.  · Her chest CT scan examination from 3/13/2020 reported this shrank to 6 mm.     · PET scan examination on 5/21/2020 reported no metabolic activity at this residual nodule.  This needs to be monitored.      3.   Pulmonary emboli with background of positive factor V Leiden mutation   · The patient has heterozygous factor V Leiden mutation and therefore was on low-dose anticoagulation with Lovenox, 40 mg daily given her increased risk of thrombosis with MediPort and GI malignancy.    · Nevertheless this patient was found to having pulmonary emboli on 9/20/2019 despite low-dose Lovenox.  She had a brief hospitalization in late September 2019, she was started on full dose Xarelto anticoagulation per protocol.    · The patient continues on Xarelto and is tolerating this well.  She is having some issues with slight nosebleeds as outlined above.  Patient to call with worsening.    4.  This patient also has strong family history for malignancy the patient had appointment with genetic counseling on September 3, 2019. NF1 [id number] 3C >T.    5.  Mild anemia, improved since her surgery.    · She also has a history of iron deficiency.  Iron studies reveal a saturation of just 10%.  She was started on oral iron but was unable to tolerate due to GI side effects.   · Status post Injectafer 2/5/2020 and 2/12/2020   · Hemoglobin was within normal limits on 4/1/2020 at 13.1  · Hemoglobin 13.4 on 5/2/2020  · Hemoglobin is 13.4 on 5/13/2020  · Hemoglobin stable at 12.6 on 5/27/2020.  · Hemoglobin stable at 13.4 on 6/24/2020.  · Hemoglobin 10.7 on 8/12/2020, postop.  Patient has improved and normalized hemoglobin 15.4 on 9/16/2020.      6.  Peripheral neuropathy secondary to chemotherapy.  This is mild involving bilateral hands and feet as reported on 9/16/2020.     7.  Hand-foot syndrome grade 3.    · It become so significant after cycle #7 FOLFOX treatment.  Discussed with patient on 5/13/2020, will discontinue the bolus 5-FU dose, and also decrease 50% of the infusion dose through the pump.   · We also use Medrol  Dosepak for possible recurrent symptomology.  She responded this well.   · Her hand-foot syndrome is improving.    8.  Hyperlacrimation and mild scleral irritation related to 5-FU.  She has been taking steroid eyedrops.  This has improved her hyperlacrimation.      PLAN:  1. Obtain PET scan examination in the next several days.   2. Continue Xarelto 20 mg daily as prescribed.  3. Repeat CMP at the time of PET scan for reassessment of elevated liver function.  4. If PET scan reports hypermetabolic lesion in the new left lower lobe pulmonary nodule, I will refer the patient back to Radiation Oncologist, Perla Lau MD concerning radiation therapy.   5. I will bring the patient back here within 2 weeks for reevaluation, to discuss the results and further treatment plan.    6. I have asked the patient to call the office should she experience new or worsening symptoms.       Discussed with the patient today and reviewed imaging studies.        ALEXANDRU HUNT M.D., Ph.D.    9/16/2020     CC:  Deepika Vela III NP-C   MD Perla Bowers MD

## 2020-09-18 ENCOUNTER — HOSPITAL ENCOUNTER (OUTPATIENT)
Dept: PET IMAGING | Facility: HOSPITAL | Age: 40
Discharge: HOME | End: 2020-09-18

## 2020-09-18 ENCOUNTER — LAB (OUTPATIENT)
Dept: LAB | Facility: HOSPITAL | Age: 40
End: 2020-09-18

## 2020-09-18 DIAGNOSIS — C20 ADENOCARCINOMA OF RECTUM (HCC): ICD-10-CM

## 2020-09-18 DIAGNOSIS — R91.8 PULMONARY NODULES: ICD-10-CM

## 2020-09-18 LAB
ALBUMIN SERPL-MCNC: 3.9 G/DL (ref 3.5–5.2)
ALBUMIN/GLOB SERPL: 0.9 G/DL (ref 1.1–2.4)
ALP SERPL-CCNC: 141 U/L (ref 38–116)
ALT SERPL W P-5'-P-CCNC: 90 U/L (ref 0–33)
ANION GAP SERPL CALCULATED.3IONS-SCNC: 12.8 MMOL/L (ref 5–15)
AST SERPL-CCNC: 45 U/L (ref 0–32)
BILIRUB SERPL-MCNC: 0.2 MG/DL (ref 0.2–1.2)
BUN SERPL-MCNC: 13 MG/DL (ref 6–20)
BUN/CREAT SERPL: 18.8 (ref 7.3–30)
CALCIUM SPEC-SCNC: 9.4 MG/DL (ref 8.5–10.2)
CHLORIDE SERPL-SCNC: 99 MMOL/L (ref 98–107)
CO2 SERPL-SCNC: 23.2 MMOL/L (ref 22–29)
CREAT SERPL-MCNC: 0.69 MG/DL (ref 0.6–1.1)
GFR SERPL CREATININE-BSD FRML MDRD: 94 ML/MIN/1.73
GLOBULIN UR ELPH-MCNC: 4.2 GM/DL (ref 1.8–3.5)
GLUCOSE BLDC GLUCOMTR-MCNC: 123 MG/DL (ref 70–130)
GLUCOSE SERPL-MCNC: 98 MG/DL (ref 74–124)
POTASSIUM SERPL-SCNC: 4 MMOL/L (ref 3.5–4.7)
PROT SERPL-MCNC: 8.1 G/DL (ref 6.3–8)
SODIUM SERPL-SCNC: 135 MMOL/L (ref 134–145)

## 2020-09-18 PROCEDURE — 78815 PET IMAGE W/CT SKULL-THIGH: CPT

## 2020-09-18 PROCEDURE — 0 FLUDEOXYGLUCOSE F18 SOLUTION: Performed by: INTERNAL MEDICINE

## 2020-09-18 PROCEDURE — 82962 GLUCOSE BLOOD TEST: CPT

## 2020-09-18 PROCEDURE — 80053 COMPREHEN METABOLIC PANEL: CPT

## 2020-09-18 PROCEDURE — 36415 COLL VENOUS BLD VENIPUNCTURE: CPT

## 2020-09-18 PROCEDURE — A9552 F18 FDG: HCPCS | Performed by: INTERNAL MEDICINE

## 2020-09-18 RX ADMIN — FLUDEOXYGLUCOSE F18 1 DOSE: 300 INJECTION INTRAVENOUS at 08:10

## 2020-09-28 ENCOUNTER — OFFICE VISIT (OUTPATIENT)
Dept: SURGERY | Facility: CLINIC | Age: 40
End: 2020-09-28

## 2020-09-28 ENCOUNTER — OFFICE VISIT (OUTPATIENT)
Dept: ONCOLOGY | Facility: CLINIC | Age: 40
End: 2020-09-28

## 2020-09-28 ENCOUNTER — LAB (OUTPATIENT)
Dept: LAB | Facility: HOSPITAL | Age: 40
End: 2020-09-28

## 2020-09-28 VITALS
HEIGHT: 66 IN | HEART RATE: 100 BPM | WEIGHT: 212.8 LBS | TEMPERATURE: 97.9 F | SYSTOLIC BLOOD PRESSURE: 140 MMHG | OXYGEN SATURATION: 99 % | DIASTOLIC BLOOD PRESSURE: 72 MMHG | BODY MASS INDEX: 34.2 KG/M2

## 2020-09-28 VITALS
OXYGEN SATURATION: 95 % | WEIGHT: 208.4 LBS | TEMPERATURE: 98 F | SYSTOLIC BLOOD PRESSURE: 130 MMHG | HEART RATE: 98 BPM | BODY MASS INDEX: 34.72 KG/M2 | HEIGHT: 65 IN | RESPIRATION RATE: 16 BRPM | DIASTOLIC BLOOD PRESSURE: 85 MMHG

## 2020-09-28 DIAGNOSIS — C20 RECTAL CANCER (HCC): Primary | ICD-10-CM

## 2020-09-28 DIAGNOSIS — C78.00 MALIGNANT NEOPLASM METASTATIC TO LUNG, UNSPECIFIED LATERALITY (HCC): ICD-10-CM

## 2020-09-28 DIAGNOSIS — C20 ADENOCARCINOMA OF RECTUM (HCC): Primary | ICD-10-CM

## 2020-09-28 DIAGNOSIS — C20 ADENOCARCINOMA OF RECTUM (HCC): ICD-10-CM

## 2020-09-28 LAB
ALBUMIN SERPL-MCNC: 3.9 G/DL (ref 3.5–5.2)
ALBUMIN/GLOB SERPL: 1 G/DL (ref 1.1–2.4)
ALP SERPL-CCNC: 124 U/L (ref 38–116)
ALT SERPL W P-5'-P-CCNC: 57 U/L (ref 0–33)
ANION GAP SERPL CALCULATED.3IONS-SCNC: 9.1 MMOL/L (ref 5–15)
AST SERPL-CCNC: 33 U/L (ref 0–32)
BILIRUB SERPL-MCNC: 0.2 MG/DL (ref 0.2–1.2)
BUN SERPL-MCNC: 12 MG/DL (ref 6–20)
BUN/CREAT SERPL: 13.3 (ref 7.3–30)
CALCIUM SPEC-SCNC: 9.7 MG/DL (ref 8.5–10.2)
CHLORIDE SERPL-SCNC: 102 MMOL/L (ref 98–107)
CO2 SERPL-SCNC: 24.9 MMOL/L (ref 22–29)
CREAT SERPL-MCNC: 0.9 MG/DL (ref 0.6–1.1)
GFR SERPL CREATININE-BSD FRML MDRD: 69 ML/MIN/1.73
GLOBULIN UR ELPH-MCNC: 3.9 GM/DL (ref 1.8–3.5)
GLUCOSE SERPL-MCNC: 94 MG/DL (ref 74–124)
POTASSIUM SERPL-SCNC: 4.5 MMOL/L (ref 3.5–4.7)
PROT SERPL-MCNC: 7.8 G/DL (ref 6.3–8)
SODIUM SERPL-SCNC: 136 MMOL/L (ref 134–145)

## 2020-09-28 PROCEDURE — 80053 COMPREHEN METABOLIC PANEL: CPT

## 2020-09-28 PROCEDURE — 99215 OFFICE O/P EST HI 40 MIN: CPT | Performed by: INTERNAL MEDICINE

## 2020-09-28 PROCEDURE — 36415 COLL VENOUS BLD VENIPUNCTURE: CPT

## 2020-09-28 PROCEDURE — 99024 POSTOP FOLLOW-UP VISIT: CPT | Performed by: COLON & RECTAL SURGERY

## 2020-09-28 RX ORDER — DICYCLOMINE HCL 20 MG
TABLET ORAL
Qty: 120 TABLET | Refills: 2 | Status: SHIPPED | OUTPATIENT
Start: 2020-09-28 | End: 2020-12-21

## 2020-09-29 ENCOUNTER — TELEPHONE (OUTPATIENT)
Dept: ONCOLOGY | Facility: CLINIC | Age: 40
End: 2020-09-29

## 2020-09-29 NOTE — TELEPHONE ENCOUNTER
----- Message from Dong Nguyen MD PhD sent at 9/29/2020 12:53 PM EDT -----  Regarding: FOLFIRI  Brigitte,    Please schedule patient come back to see me at 8:20 on October 13, 2020, 2 units, CBC CMP, and a block for chemotherapy with FOLFIRI.  I just spoke to patient moments ago.    For Brigitte and Emelia,    Please obtain insurance approval for treatment plan.  I saw her on 9/20/2020 she has disease progression for colorectal cancer    Thank you very much!     Patrick

## 2020-10-05 ENCOUNTER — TELEPHONE (OUTPATIENT)
Dept: ONCOLOGY | Facility: CLINIC | Age: 40
End: 2020-10-05

## 2020-10-05 PROBLEM — C78.00 SECONDARY CANCER OF LUNG (HCC): Status: ACTIVE | Noted: 2020-10-05

## 2020-10-05 NOTE — TELEPHONE ENCOUNTER
----- Message from Dong Nguyen MD PhD sent at 10/5/2020 12:50 PM EDT -----  Regarding: Chemotherapy teaching  Brigitte please call her to arrange for teaching session for this new regimen FOLFIRI    Thanks!    Patrick

## 2020-10-05 NOTE — PROGRESS NOTES
REASON FOR FOLLOW UP:     1. Rectal cancer, rectal ultrasound examination in July 2019 reported T3N0 disease without lymphadenopathy. She does have small pulmonary nodule 6-7 mm and 2 mm with indeterminate feature. There is no solid evidence of metastatic disease otherwise. Patient has stage IIA (T3N0M0) disease.  2. The patient is heterozygous factor V Leiden, was on prophylactic anticoagulation with Lovenox 40 mg daily given her increased risk of thrombosis with MediPort and GI malignancy.   3. PET scan on 8/8/2019 reported an 8 mm hypermetabolic right upper lobe pulmonary nodule, which is suspicious for metastatic as well.    4. Patient had a PowerPort placement on the left upper chest by Dr. Joseph on 8/9/2019.  5. Patient was started on concurrent infusional 5-FU chemoradiation therapy on 8/12/2019, with planned complete surgical resection and further adjuvant chemotherapy with intention to cure the disease.   6. Patient underwent surgical resection of the primary rectal cancer by Dr. Ye on 12/2/2019.  Pathology evaluation reported residual T3N1 disease stage IIIb.  7. Diarrhea related to therapy and radiation.   8. Patient was started cycle 1 day 1 of adjuvant FOLFOX 6 on 1/23/2020.  9. On 2/5/2020 FOLFOX 6 cycle 2 delayed secondary to neutropenia.  Patient was given 3 days of Granix injection.  After cycle #2 we planned 3 days of Granix with each cycle.   10. Patient also intolerant of oral iron.  Patient received 2 doses of IV injectafer on 02/05/2020 and 02/12/2020.   11. 02/12/2020 Proceed with cycle #2 of FOLFOX 6 with 3 days of Granix.  12. On 3/11/2020 cycle 4 postponed secondary to abdominal pain and occasional low-grade fevers.  CT scans ordered.  13. Cycle #4 resumed after CT scan revealed no evidence of disease.  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 Ye. patient has no fever.  Continue to observe.   14. Grade 3  hand-foot syndrome secondary to 5-FU after cycle #7 FOLFOX 6 chemotherapy, prompting ER visit.  Also has worsening peripheral neuropathy.   15. Cycle #8 FOLFOX 6 was given on 5/13/2020, with 50% dose reduction for both 5-FU and oxaliplatin, and also examination of bolus 5-FU.   16. PET scan examination on 5/21/2020 reported no evidence of metastatic disease in the chest abdomen pelvis.  Stable 6 mm RUL pulmonary nodule.  17. On 5/27/2020, I discussed with the patient that we can discontinue chemotherapy at this time.   18. Patient had a surgical procedure for low anterior colon resection, coloanal anastomosis on 8/3/2020.  19. CT scan for chest abdomen pelvis on 9/9/2020 reported a new 8 mm noncalcified pulmonary nodule in the left lower lobe of the lung.  Otherwise stable right upper lobe 6 mm pulmonary nodule, and no evidence of disease recurrence in the abdomen or pelvis.  20. PET scan examination on 9/18/2020 reported multiple hypermetabolic small pulmonary nodules/ new pulmonary nodules.    HISTORY OF PRESENT ILLNESS:  The patient is a 40 y.o. female with the above medical history who presents today to discuss the results of PET scan examination obtained on 9/18/2020.    Patient reports that she still has lower abdominal wall vacuum suction in place, but better compared to 2 weeks ago.  She denies fever sweating chills.  Performance status is ECOG 1.  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  She does have visiting nurse come to the home for wound care.    Because of the abnormal CT scan examination for chest abdomen pelvis on 9/9/2020 reported a new 8 mm noncalcified pulmonary nodule in the left lower lobe, we obtained a PET scan examination for further evaluation, which was done on 9/18/2020.  This study reported several pulmonary nodules, some of them are hypermetabolic, newly developed compared to previous PET scan in May 2020.  Certainly does highly suspicious for metastatic  disease.  There are also hypermetabolic activity in the abdomen and pelvic area which is hard to differentiate from surgical scar versus metastatic malignancy.    Laboratory study today on 9/20/2020 reported improved liver function panel, with ALT 57 AST 33 and alk phosphatase 124, normal total bilirubin 0.2.  Having normal renal function and electrolytes.      Past Medical History:   Diagnosis Date   • Abnormal Pap smear of cervix 02/02/1998    JULIUS I   • Anemia in pregnancy    • Anxiety    • Bilateral epiphora 04/2020   • Bilateral hand swelling 05/02/2020    SEEN AT [facility] ER   • Cervical lymphadenitis 06/06/2012    SEEN AT [facility] ER   • Chemotherapy induced neutropenia (CMS/HCC) 04/2020   • Chemotherapy-induced nausea 02/2020   • Chemotherapy-induced thrombocytopenia 05/2020   • Chronic diarrhea    • Colon polyps     FOLLOWED BY DR. GERONIMO ESPARZA   • Cystitis 04/24/2020    WITH DEHYDRATION, ADMITTED TO [facility]   • Drug-induced peripheral neuropathy (CMS/HCC) 05/2020   • Fistula of intestine    • GERD (gastroesophageal reflux disease)    • Hand foot syndrome 05/2020   • Heart murmur     IN CHILDHOOD   • Hematochezia    • Hemorrhoids    • Heterozygous factor V Leiden mutation (CMS/HCC)     DX 8-2-2019   • History of anemia    • History of chemotherapy 2019    FOLLOWED BY DR. ALEXANDRU HUNT   • History of gestational diabetes    • History of pre-eclampsia    • History of radiation therapy 2019    FOLLOWED BY DR. JAVON LEWIS   • History of TB skin testing 01/17/2009    TB Skin Test   • HPV in female 1998   • Hypokalemia 09/2019   • Hypomagnesemia 09/2019   • IBS (irritable bowel syndrome)    • Ileostomy in place (CMS/HCC)     FOLLOWED BY DR. GERONIMO ESPARZA   • Infected insect bite of neck 05/27/2016    SEEN AT [facility]   • Kidney stones 08/09/2007    SEEN AT [facility] ER   • Lump of right breast     SWOLLEN LYMPH NODE   • Monopolar depression (CMS/HCC)    • On anticoagulant therapy    • PIH (pregnancy induced hypertension) 1998   •  Pulmonary embolism without acute cor pulmonale (CMS/HCC) 09/20/2019    X 3; ADMITTED TO [facility]   • Pulmonary nodule, right 2020   • Rectal cancer (CMS/HCC) 2019    INVASIVE MODERATELY DIFFERENTIATED ADENOCARCINOMA, CHEMO AND XRT FINISHED 2019   • Right ureteral stone 10/01/2019    SEEN AT [facility] ER   • SAB (spontaneous ) 1996     A2-1 INDUCED   • Sciatica    • Sepsis due to cellulitis (CMS/HCC) 2002    LEFT GREAT TOE, ADMITTED TO [facility]   • Tachycardia 2020   • Urinary urgency 2020     Past Surgical History:   Procedure Laterality Date   • CHOLECYSTECTOMY N/A 10/10/2003    LAPAROSCOPIC WITH CHOLANGIOGRAM, DR. JAMEY TALAVERA AT [facility]   • COLON RESECTION N/A 2019    Procedure: laparoscopic low anterior colon resection with mobilization of splenic flexure and diverting loop ileostomy: ERAS;  Surgeon: Padmaja Esparza MD;  Location: [facility];  Service: General   • COLON RESECTION N/A 8/3/2020    Procedure: LOW ANTERIOR COLON RESECTION, COLOANAL ANASTOMOSIS, MOBILIZATION SPLENIC FLEXURE;  Surgeon: Padmaja Esparza MD;  Location: [facility] OR;  Service: General;  Laterality: N/A;   • COLONOSCOPY N/A 7/15/2020    PATENT ANASTAMOSIS IN MID RECTUM, RESCOPE IN 1 YR, DR. PADMAJA ESPARZA AT [facility]   • COLONOSCOPY W/ POLYPECTOMY N/A 2019    15 MM TUBULOVILLOUS ADENOMA POLYP IN HEPATIC FLEXURE, 20 MMTUBULOVILLOUS ADENOMA WITH HIGH GRADE DYSPLASIA IN RECTOSIGMOID, 4 CM MASS IN MID RECTUM, PATH: INVASIVE MODERATELY DIFFERENTIATED ADENOCARCINOMA, DR. JENNIFER LI AT [facility]   • DILATATION AND EVACUATION N/A    • ENDOSCOPY N/A 2019    LA GRADE A ESOPHAGITIS, GASTRITIS, ALL BIOPSIES BENIGN, DR. JENNIFER LI AT [facility]   • INCISION AND DRAINAGE PERIRECTAL ABSCESS N/A 2020    Procedure: INCISION AND DRAINAGE OF retrorectal dehiscence abcess with drain placement and irrigation;  Surgeon: Padmaja Esparza MD;  Location: [facility];  Service:  General;  Laterality: N/A;   • PAP SMEAR N/A 01/24/2014   • SIGMOIDOSCOPY N/A 7/24/2019    INFILTRATIVE PARTIALLY OBSTRUCTING LARGE RECTAL CANCER, AREA TATOOED, DR. GERONIMO YE AT [facility]   • SIGMOIDOSCOPY N/A 11/23/2019    AN ULCERATED PARTIALLY OBSTRUCTING MASS IN MID RECTUM, AREA TATTOOED, DR. GERONIMO YE AT [facility]   • TRANSRECTAL ULTRASOUND N/A 7/24/2019    Procedure: ULTRASOUND TRANSRECTAL;  Surgeon: Geronimo Ye MD;  Location: [facility] ENDOSCOPY;  Service: General   • URETEROSCOPY LASER LITHOTRIPSY WITH STENT INSERTION Right 10/30/2019    DR. ESTUARDO BEASLEY AT [facility]   • VAGINAL DELIVERY N/A 09/18/1998   • VENOUS ACCESS DEVICE (PORT) INSERTION Left 8/9/2019    Procedure: INSERTION VENOUS ACCESS DEVICE;  Surgeon: Sj Joseph MD;  [facility];  Service: General   • WISDOM TOOTH EXTRACTION Bilateral 1993       HEMATOLOGIC/ONCOLOGIC HISTORY:      The patient reports she has intermittent rectal bleeding and urgency, mucous with her stool, starting sometime in 2016. At that time she was referred to GI service, and was diagnosed of irritable bowel syndrome. Nevertheless she had worsening urgency for bowel movement, and had a couple of incidents losing control of stool. She was recently seen by Roland Thorpe MD again and had colonoscopy and EGD exam on 07/08/2019. She was found having a circumferential rectal mass. According to the procedure note, the patient had a fungating circumferential bleeding 4 cm mass in the middle rectum, at distance between 13 cm and 17 cm from the anus. Mass was causing partial obstruction. There were also colon polyps found at the hepatic flexure and also at the rectosigmoid junction 23 cm from the anus. Both were resected and retrieved. EGD examination reported grade A esophagitis at the GE junction and patchy discontinuous erythema and aggravation of the mucosa without bleeding in the stomach body. There is normal mucosa of the duodenum. Pathology evaluation from this  procedure reported moderately differentiated adenocarcinoma involving the rectal mass. The rectal sigmoid junction polyp was tubular/tubulovillous adenoma with high grade dysplasia negative for invasive malignancy. The hepatic flexure polyp was also tubular/tubulovillous adenoma negative for high grade dysplasia or malignancy. The biopsy from the esophagus reports squamocolumnar mucosa with inflammatory changes suggestive of mild reflux esophagitis, negative for interstitial metaplasia. Gastric biopsy was benign and duodenal biopsy was also benign. There is no mention of H-pylori.     Patient was subsequently referred to colorectal surgeon Padmaja Ye MD for further evaluation. The patient had CT scan examination for chest, abdomen and pelvis requested by Dr. Ye and were done on 07/13/2019. The study reported no evidence of lymphadenopathy in the abdomen and pelvis. There was wall thickening of the rectosigmoid junction. Normal spleen, pancreas, adrenal glands and kidneys. There was fatty liver infiltration but no focal lymphatic lesions. There was a small 6-7 mm noncalcified nodule in the right upper lobe and a tiny 3 mm subpleural nodule in the right middle lobe. No mediastinal or hilar lymphadenopathy.     Dr. Ye performed staging rectal ultrasound examination. This study reported tumor penetrating through the muscularis propria as T3 disease; there were no lymph nodes identified.    She had a hospitalization in late September 2019 because of newly discovered pulmonary emboli.  She was on prophylactic Lovenox prior to the incident of PE.  Now she is on full dose Xarelto anticoagulation.  Patient reports no further chest pain dyspnea.  She denies bleeding or bruising.  During her hospitalization, she was seen by Dr. Ye, who plans to have surgery 8 to 12 weeks after finishing radiation therapy.  She finished her radiation on 9/19/2019.     I noticed patient also presented to the emergency room on  10/1/2019 complaining of right flank area pain.  I reviewed the images studies and indeed she has a very small 1 to 2 mm obstructing kidney stone in the UV junction.  Patient is still symptomatic with some pains and dysuria.  She denies fever sweating or chills.    Laboratory study on 10/7/2019 reported normal CBC including hemoglobin 13.1, platelets 301,000, WBC 6170 and ANC 4900 lymphocytes 590 monocytes 480.      The nurse reported malfunction of port-a-catheter on 10/7/2019.  Port study on 10/8/2019 showed fibrin sheath around the distal aspect of the Mediport catheter in the SVC. This does not appear to hinder injection, but does prevent aspiration at this time.    Patient underwent surgical resection of the colon on 12/2/2019 with Dr. Ye.  Pathology evaluation reported residual T3 disease, also 1 out of 14 lymph nodes positive for malignancy.  So this patient in either had at least stage IIIb disease (T3 N1 M0?).      Adjuvant chemotherapy FOLFOX 6 will be started on 1/22/2020.  Laboratory study reported iron saturation 10%, free iron 31 TIBC 319 and ferritin 168.  Her hemoglobin was 11.8, WBC 5600, and platelets 347,000.    Patient was here on 02/12/2020 for cycle 2 of her FOLFOX.  This is delayed x1 week secondary to neutropenia.  The patient ultimately received 3 days worth of Neupogen with recovery of her blood counts.  Of note, the patient struggled with significant nausea without any episodes of vomiting following cycle #1 of chemotherapy resulting in significant weight loss.  She is up 12 pounds over the last week as her appetite has normalized.  We will add Emend to her care plan.    She is having several loose stools per her colostomy and has been hesitant to take Imodium due to prior history of constipation.  I encouraged her to try this with a maximum of 8 tablets/day.  She denies any infectious symptoms including fevers or chills.  No excess bleeding or bruising noted.  She had the expected cold  sensitivity related to the Oxaliplatin for about 3 days following treatment.    Labs from 02/12/2020 demonstrated total white blood cell count of 5.14, ANC of 3.06, hemoglobin of 11.2, platelets of 211,000.  She was found to be iron deficient last week and is intolerant to oral iron secondary to GI distress.  For this reason, she initiated IV iron therapy with Injectafer last week.  She had received her second dose 02/12/2020    Patient has normalized hemoglobin 12.2 on 2/26/2020.    She reported on 5/5/2020 she had a recent visit to the emergency department for what was suspected to be an allergic reaction.  She called our on-call service on Saturday with reports of hand and face swelling.  She was instructed to proceed to the emergency department at which time she was assessed and prescribed a Medrol Dosepak.  She had just completed her 5-FU and was unhooked on Friday, 5/3/2020.  Her symptoms have improved.  She does report persistent hyperpigmentation and mild swelling of the palms of the hands but this is much improved.  It was her right hand specifically that was swollen.  Her facial swelling has resolved.  She continues on Cefdinir nd since has 1 day remaining, she was prescribed cefdinir for a UTI requiring hospitalization at the end of April.  Reports no new symptoms.  Her labs are stable.  She is scheduled for treatment again.    Patient states at this time she is not tolerating her chemotherapy well.     Patient seen in the emergency department on 5/2/2020 for what was suspected to be an allergic reaction.  She called our on-call service on Saturday explaining that she was experiencing hand and face swelling.  She was instructed to proceed to the emergency department at which time she was assessed and prescribed a Medrol Dosepak.  She had just completed her 5-FU and was unhooked on Friday, 5/1/2020.      She reports since her ED visit on 5/2/2020 her symptoms have not improved. Her hands and feet were  swollen upon presenting to the ED and she could barely make a fist. She states that she feels so swollen she is not able to stand it. She states that her skin on the hands and feet are peeling extensively as well, besides changing color to more dark.     She also reports significant fatigue after her first week of the 5-FU treatment but she expected this side effect. She also notices significant increase in her neuropathy to the point that she is not able to even walk around in her home without her house slippers due to irritation from her rugs. She denies and associated nausea or vomiting at this time. She also has episodes of epistaxis every day after the chemotherapy cycle #7.  She does report working full-time during the week of chemotherapy.      Laboratory studies, 5/13/2020, show moderate thrombocytopenia platelets 123,000, low normal WBC 4140 including ANC 2720 and normal hemoglobin 13.4.  Because significant hand-foot syndrome, will decrease both 5-FU and oxaliplatin by 50%, and   eliminate bolus dose of 5-FU.    On 5/21/2020 patient had a PET scan performed which showed no convincing evidence of residual disease in abdomen or pelvis, no metastatic disease within the chest or neck.     Cycle #8 FOLFOX 6 was given on 5/13/2020, with 50% dose reduction for both 5-FU and oxaliplatin, and also examination of bolus 5-FU.  She states on 5/27/2020 that with the recent reduction of the chemotherapy she feels significantly better. She has more energy and is able to do her daily routine.      PET scan examination on 5/21/2020 reported no evidence of metastatic disease in chest abdomen pelvis.  There was a stable 6 mm right upper lobe pulmonary nodule.    Laboratory studies on 5/27/2020 showed mild leukocytopenia WBC 3720 but a normal ANC 2250 and lymphocytes 630.  Normal platelets 163,000 and hemoglobin 12.6.  Chemistry lab reported normal renal function, liver function panel and a electrolytes, elevated glucose  164.    Laboratory studies 6/24/2020, showed normal hemoglobin 13.4 but macrocytosis .9.  Normal platelets 210,000 and WBC 5870.  She had normal CMP.     Patient last time was here in late June 2020.  Since that time she had surgical procedure for low anterior colon resection, coloanal anastomosis on 8/3/2020.  She later developed a perirectal abscess, required surgical drainage on 8/14/2020.  She was discharged on 8/27/2020.    Patient reports to me she still has lower abdominal wall vacuum suction in place.  She denies fever sweating chills.  Performance status is ECOG 1.  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  She does have visiting nurse come to the home for wound care.    CT scan for chest abdomen pelvis on 9/9/2020 reported a new 8 mm noncalcified pulmonary nodule in the left lower lobe.  Otherwise stable right upper lobe 6 mm pulmonary nodule, and no evidence of disease recurrence in the abdomen or pelvis.     Laboratory study on 9/16/2020 reported elevated AST 55, ALT 95, alk phosphatase 143 but normal total bilirubin 0.3.  Chemistry lab otherwise unremarkable.  She has completed normal CBC.        MEDICATIONS    Current Outpatient Medications:   •  clonazePAM (KlonoPIN) 1 MG tablet, TAKE 1 TABLET BY MOUTH 3 (THREE) TIMES A DAY AS NEEDED FOR ANXIETY OR SEIZURES., Disp: 90 tablet, Rfl: 0  •  dicyclomine (BENTYL) 20 MG tablet, TAKE 1 TABLET BY MOUTH EVERY 6 HOURS AS NEEDED, Disp: 120 tablet, Rfl: 2  •  escitalopram (LEXAPRO) 20 MG tablet, Take 1 tablet by mouth Daily., Disp: 90 tablet, Rfl: 3  •  famotidine (PEPCID) 20 MG tablet, TAKE 1 TABLET BY MOUTH TWICE A DAY (Patient taking differently: Take 20 mg by mouth Every Evening.), Disp: 180 tablet, Rfl: 1  •  HYDROcodone-acetaminophen (NORCO) 7.5-325 MG per tablet, Take 1 tablet by mouth Every 6 (Six) Hours As Needed for Moderate Pain ., Disp: 240 tablet, Rfl: 0  •  Loratadine (CLARITIN) 10 MG capsule, Take 10 mg by mouth  Every Evening., Disp: , Rfl:   •  ondansetron (ZOFRAN) 8 MG tablet, Take 1 tablet by mouth 3 (Three) Times a Day As Needed for Nausea or Vomiting., Disp: 60 tablet, Rfl: 5  •  prochlorperazine (COMPAZINE) 10 MG tablet, Take 1 tablet by mouth Every 6 (Six) Hours As Needed for Nausea or Vomiting., Disp: 30 tablet, Rfl: 2  •  rivaroxaban (XARELTO) 20 MG tablet, Take 1 tablet by mouth Daily. Start after finishing starter pack. (Patient taking differently: Take 20 mg by mouth Daily. HOLDING 2 DAYS PRIOR TO SURGERY), Disp: 30 tablet, Rfl: 11    ALLERGIES:   No Known Allergies    SOCIAL HISTORY:       Social History     Tobacco Use   • Smoking status: Heavy Tobacco Smoker     Packs/day: 1.00     Years: 24.00     Pack years: 24.00     Types: Electronic Cigarette, Cigarettes   • Smokeless tobacco: Never Used   • Tobacco comment: LAST CIGARETTE 8/12/2020   Substance Use Topics   • Alcohol use: Not Currently   • Drug use: No         FAMILY HISTORY:   Mother has positive factor V Leiden mutation, although she did not have thrombosis, mother also is coronary disease with stenting, she also is occasional bruising.  Maternal grandmother had DVT, she had multiple surgical procedures.  Patient mother's half-brother had metastatic colon cancer at diagnosis in his 50s.  Maternal great aunt has breast cancer.  Patient will follow his skin cancer in his 60s, exclusive.    REVIEW OF SYSTEMS:  Review of Systems   Constitutional: Negative for activity change, appetite change, chills, diaphoresis, fatigue, fever and unexpected weight change.   HENT: Negative for congestion, hearing loss, mouth sores, nosebleeds and trouble swallowing.    Eyes: Negative for photophobia and visual disturbance.   Respiratory: Negative for cough, chest tightness and shortness of breath.    Cardiovascular: Negative for chest pain, palpitations and leg swelling.   Gastrointestinal: Negative for abdominal distention, abdominal pain, anal bleeding, constipation,  "diarrhea and nausea.   Endocrine: Negative for cold intolerance and heat intolerance.   Genitourinary: Negative for dysuria and hematuria.   Musculoskeletal: Negative for arthralgias, back pain and joint swelling.   Skin: Positive for wound (Low abdomen wound with vacuum suction in place). Negative for color change and rash.   Allergic/Immunologic: Positive for immunocompromised state (Secondary to adjuvant chemotherapy). Negative for environmental allergies and food allergies.   Neurological: Negative for dizziness, numbness and headaches.   Hematological: Negative for adenopathy. Does not bruise/bleed easily.   Psychiatric/Behavioral: Negative for agitation, behavioral problems, confusion and suicidal ideas.          Vitals:    09/28/20 1436   BP: 130/85   Pulse: 98   Resp: 16   Temp: 98 °F (36.7 °C)   SpO2: 95%   Weight: 94.5 kg (208 lb 6.4 oz)   Height: 166 cm (65.35\")   PainSc: 0-No pain     Current Status 9/28/2020   ECOG score 0     PHYSICAL EXAM:    CONSTITUTIONAL:  Vital signs reviewed.  Well-developed, well-nourished female, no distress, looks comfortable.  EYES:  Conjunctiva and lids unremarkable.  Pupils equal and round.  EARS,NOSE,MOUTH,THROAT:  Ears appear unremarkable.  Patient wears mask due to the pandemic coronavirus infection.   RESPIRATORY:  Normal respiratory effort.  Lungs clear to auscultation bilaterally.  CARDIOVASCULAR: Regular rhythm and rate.  Normal S1, S2.  No murmurs.   GASTROINTESTINAL: Abdomen no tender.  Ileostomy in the right lower abdomen.  Bowel sounds normal.  Nontender.     LYMPHATIC:  No cervical, supraclavicular lymphadenopathy.  MUSCULOSKELETAL:  Unremarkable gait and station.  No cyanosis or clubbing.  No significant lower extremity edema.   SKIN:  Warm.  No rashes.  Lower abdomen wound has vacuum suction in place.  PSYCHIATRIC:  Normal judgment and insight.  Patient becomes very emotional, we discussed results of PET scan, indicating metastatic disease.    RECENT " LABS:    Lab Results   Component Value Date    WBC 6.96 09/16/2020    HGB 15.4 09/16/2020    HCT 46.4 09/16/2020    MCV 93.5 09/16/2020     09/16/2020     Lab Results   Component Value Date    NEUTROABS 5.25 09/16/2020     Glucose   Date Value Ref Range Status   09/28/2020 94 74 - 124 mg/dL Final     BUN   Date Value Ref Range Status   09/28/2020 12 6 - 20 mg/dL Final     Creatinine   Date Value Ref Range Status   09/28/2020 0.90 0.60 - 1.10 mg/dL Final   09/09/2020 0.60 0.60 - 1.30 mg/dL Final     Comment:     Serial Number: [id number]:  [id number]     Sodium   Date Value Ref Range Status   09/28/2020 136 134 - 145 mmol/L Final     Potassium   Date Value Ref Range Status   09/28/2020 4.5 3.5 - 4.7 mmol/L Final     Chloride   Date Value Ref Range Status   09/28/2020 102 98 - 107 mmol/L Final     CO2   Date Value Ref Range Status   09/28/2020 24.9 22.0 - 29.0 mmol/L Final     Calcium   Date Value Ref Range Status   09/28/2020 9.7 8.5 - 10.2 mg/dL Final     Total Protein   Date Value Ref Range Status   09/28/2020 7.8 6.3 - 8.0 g/dL Final     Albumin   Date Value Ref Range Status   09/28/2020 3.90 3.50 - 5.20 g/dL Final     ALT (SGPT)   Date Value Ref Range Status   09/28/2020 57 (H) 0 - 33 U/L Final     AST (SGOT)   Date Value Ref Range Status   09/28/2020 33 (H) 0 - 32 U/L Final     Alkaline Phosphatase   Date Value Ref Range Status   09/28/2020 124 (H) 38 - 116 U/L Final     Total Bilirubin   Date Value Ref Range Status   09/28/2020 0.2 0.2 - 1.2 mg/dL Final     eGFR Non  Amer   Date Value Ref Range Status   09/28/2020 69 >60 mL/min/1.73 Final     BUN/Creatinine Ratio   Date Value Ref Range Status   09/28/2020 13.3 7.3 - 30.0 Final     Anion Gap   Date Value Ref Range Status   09/28/2020 9.1 5.0 - 15.0 mmol/L Final       IMAGING STUDY:  F-18 FDG PET FROM SKULL BASE TO MID THIGH WITH PET/CT FUSION 9/18/2020.     HISTORY: Rectal cancer, restaging.     TECHNIQUE: Radiation dose reduction techniques were  utilized, including  automated exposure control and exposure modulation based on body size.   Blood glucose level at time of injection was 123 mg/dL.  5.7 mCi of F-18  FDG were injected and PET was performed from skull base to mid thigh. CT  was obtained for localization and attenuation correction. Time at  injection 0810. PET start time 0944.      COMPARISON: Compared with PET/CT 05/21/2020, CT chest, abdomen and  pelvis 09/09/2020 and CT abdomen and pelvis 08/12/2020.     FINDINGS:   There is no cervical adenopathy demonstrating FDG uptake significantly  above that of blood pool.     The thyroid, submandibular and parotid glands demonstrate roughly  symmetric FDG uptake.     There is no hilar, mediastinal or axillary adenopathy demonstrating FDG  uptake significantly above that of blood pool.     Left Port-A-Cath tip terminates within the superior vena cava. Heart is  normal in size. There is no significant pericardial effusion.     There are approximately 5-7 bilateral pulmonary nodules present, some of  which are new or have increased in size with index lesions as below:  *  Right upper lobe pulmonary nodule appears to have minimally increased  in size since 05/21/2020, measuring 0.7 cm in average axial dimension  (previously 0.5 cm). It also demonstrates increased FDG uptake with a  max SUV of 4 (previously demonstrating max SUV of 0.7).      The remainder of the bilateral subcentimeter pulmonary nodules are below  PET resolution with index lesions as below:    *  A 0.8 cm pulmonary nodule within the left lower lobe demonstrates a  max SUV of approximately 4.5 and has increased in size since 03/13/2020  where it measured up to approximately 0.5 cm in average axial dimension.  *  Sub-6 mm pulmonary nodule within the left upper lobe is new since  05/21/2020.     There is no pulmonary consolidation, pleural effusion or pneumothorax.     Uptake within the liver is somewhat heterogenous; however there is  no  definite focal FDG avid hepatic lesion visualized. Hepatic steatosis is  present. The gallbladder is surgically absent.     No focal FDG avid abnormality is visualized within the pancreas, spleen,  adrenal glands or kidneys. Hepatic steatosis is present.  There is no  hydronephrosis.     Postsurgical changes from low anterior resection with right lower  quadrant diverting loop ileostomy are present, as before. There are  ill-defined areas of soft tissue thickening along the anterior aspect of  the peritoneal cavity and omentum, many of which demonstrate  mild-to-moderate FDG uptake and were not definitively seen on  05/21/2020. A few index areas of soft tissue thickening along the  anterior aspect of the peritoneum just superior to the surgical incision  and left aspect of the pelvis demonstrate a max SUV of 7.2 and are new  since 05/21/2020 but correspond with areas of partially loculated fluid  seen on 08/12/2020.     Area of moderate to intense FDG uptake overlies the vagina demonstrating  max SUV of 9.8 which was not seen on 05/21/2020.     Loculated collections of fluid and air are present within the presacral  soft tissues and measure up to 4.3 x 1.8 cm, overall grossly unchanged  in size since 09/09/2020 when accounting for difference in technique;  however this collection contains more gas. Ill-defined thickening  projecting through the bilateral pelvic sidewalls and pelvis is also  present, as before.     There are no suspicious FDG avid lytic or blastic osseous lesions.     IMPRESSION:  1.  Approximately 5-7 bilateral pulmonary nodules, a few of which have  increased in size and demonstrate FDG uptake. Findings likely represent  metastatic disease.  2.  Loculated collection of fluid and gas within the presacral soft  tissues which is grossly unchanged in size since 09/09/2020 but  demonstrates more air, representing sequela of known rectal leak. There  is new intensely FDG avid uptake overlying the  lower aspect of the  vagina. This area is not well evaluated on CT and given the history,  underlying rectovaginal fistula cannot be excluded and correlation with  patient history is recommended.  3.  Areas of ill-defined soft tissue thickening throughout the abdomen  and pelvis, some areas of which are new since 05/21/2020 but appear to  correspond with resolving partially loculated fluid collection seen on  08/12/2020. While findings are favored to be reactive, peritoneal spread  of malignancy could appear similar and continued close attention on  follow-up is recommended to ensure stability.  4.  Other findings as above.      Assessment/Plan      ASSESSMENT:   1.  Newly diagnosed rectal cancer, rectal ultrasound examination reported T3N0 disease without lymphadenopathy. CT scan of chest, abdomen and pelvis reported no lymphadenopathy in the abdomen, pelvis or chest. She does have small pulmonary nodule 6-7 mm and 2 mm with indeterminate feature. There is no solid evidence of metastatic disease otherwise.   · Based on the CT scan and rectal ultrasound imaging studies, this patient had stage IIA (T3N0M0) disease.    · She had PET scan examination on 8/8/2019 which reported a hypermetabolic right upper lobe pulmonary nodule 6 mm with SUV 5.6.  This is suspicious for metastatic disease.  This patient may have stage IV disease.   · She initiated concurrent radiation with continuous 5-FU on 8/12/2019.  · Patient finished concurrent chemoradiation therapy.  · Patient underwent surgical resection of the rectal tumor and diverting loop ileostomy on 12/2/2019 with Dr. Ye.  Pathology evaluation reported residual T3 disease, with 1 out of 14 lymph nodes positive for malignancy.  Certainly this patient has at least stage IIIb rectal cancer (T3 N1 M0?)  · On 1/22/2020 adjuvant chemotherapy FOLFOX 6 regimen initiated.    · On 2/5/2022 cycle #2 was delayed secondary to neutropenia.  She was given 3 days of Granix.   · Emend  added with cycle 3.  With improved nausea control  · Continuing home Granix x3 days following 5-FU disconnect  · 3/11/2020 due for cycle 4, however, she is experiencing progressive abdominal pain and occasional fevers.   · CT scan performed on 3/13/2020 reveals no evidence of progressive or recurrent disease.  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  · Patient hospitalized 4/24-4/26/20 after cycle 5 of chemotherapy with acute UTI.  CT abdomen/pelvis noting fluid collection in the presacral region having diminished in size compared to CT dated 3/13/2020.  Patient was evaluated by Dr. Ye while in the hospital with further plans to evaluate possible colovaginal fistula following completion of chemotherapy.  Patient did respond to IV antibiotics and discharged home on oral cefdinir.  · 4/29/2020 cycle 6 of FOLFOX.  Urinary symptoms have resolved   · Patient seen in the [facility] ED on 5/2/2020 for what was suspected to be an allergic reaction.  She called our service on Saturday explaining that she was experiencing hand and face swelling.  She was instructed to proceed to the emergency department at which time she was assessed and prescribed a Medrol Dosepak.  She had just completed her 5-FU and was unhooked on Friday, 5/1/2020.  Her symptoms have resolved in the office on assessment, 5/5/2020.  · The patient had grade 3 hand-foot syndrome based on symptomology.  Patient had cycle #8 of 5-FU on 5/13/2020. Due to her symptoms and poor tolerance to the 5-FU, her treatment dose will be reduced 50% for oxaliplatin and infusional 5-FU.  Bolus 5-FU will be discontinued..  · On 5/13/2020  We discussed further treatment options pending the scan results.  If she has indeed residual disease or metastatic disease, we will switch her to irinotecan plus Avastin or anti-EGFR monoclonal antibody.  She will need a further molecular testing of her tumor samples in that case.  · On 5/21/2020 patient had a PET scan and it showed no  evidence of of metastatic disease in the neck, chest, abdomen or pelvis.  There was fluid accumulation/abscess.   · On 5/27/2020 I discussed with the patient that we can discontinue chemotherapy at this time.  She will follow-up with Dr. Ye to discuss a possibility to reverse the ileostomy.  We likely will obtain anther PET scan in 3 to 4 months for reassessment.    · Patient was seen by Dr. Ye on 6/19/2019 for evaluation and to discuss possible take down of her ileostomy.  She is scheduled to have a gastrografin enema on 7/2/2020 to evaluate for a fistula, and then a colonoscopy on 7/15/2020, both done by Dr. Ye.  She states that based on the above imaging and procedure findings, she may have a more extensive surgery done or just have her ileostomy reversed, which would likely be done in August 2020.  This will all be coordinated under the care of Dr. Ye.     · I reviewed scan results with the patient today on 9/16/2020 for the most recent CT scan from 09/09/2020 and also compared it to her previous PET scan examination from 05/21/2020 and also the original PET scan from 08/09/2019 and during the original PET scan there is a small right upper lobe pulmonary nodule 6 mm with SUV 5.6. So in the 05/2020 PET scan the nodule is still there but activity seems much less when I reviewed myself and this most recent CT scan examination also documented the preexisting small pulmonary nodule however there is a new left lower lobe pulmonary nodule 8 mm in size and I shared with the patient today this nodule is suspicious for metastatic disease. Laboratory studies reported minimal CEA level 1.32 on 09/09/2020. Liver function panel was only marginally abnormal with the ALT 45, the remaining is normal. However laboratory study today showed worsening AST 55, ALT 95 and alkaline phosphatase 143 but is still normal, total bilirubin 0.3.   · So I discussed with the patient on 9/16/2020 recommended to have repeat PET scan  examination for assessment because the left lower lobe pulmonary nodule is too small to be biopsied, and if the PET scan reports hypermetabolic lesion, I recommended to have stereotactic radiation therapy. Explained to patient that she is not a really good candidate to have thoracotomy for resection because she still has unhealed wound in the abdomen. I think the stereotactic radiation therapy will be a more feasible choice.  · Laboratory study on 9/16/2020 reported worsening liver function panel with both elevated AST, ALT and also slightly worsened alkaline phosphatase despite having normal total bilirubin. I recommended to repeat laboratory study for reevaluation. The only new medication she has in the past several days is Augmentin but otherwise no change of condition. She denies any recent viral infection. We will monitor this.   · PET scan examination obtained on 9/18/2020 reported a few hypermetabolic pulmonary nodules, and some new small pulmonary nodules, or highly suspicious for metastatic disease.  She also has hypermetabolic activity in the abdomen and pelvic area which could be related to scar tissue from her recent surgery versus metastatic disease.  Nevertheless overall picture fits with metastatic cancer.  · I discussed with the patient on 9/20/2020, we reviewed the PET scan images together, and I recommended to have systematic chemotherapy, I do not think stereotactic reading therapy to the hypermetabolic lesions in the lungs warranted at this time because even those will be treated with reading therapy, she will still need systematic chemotherapy.  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  I did discuss with the patient using anti-EGFR monoclonal antibody versus anti-VEGF monoclonal antibody.  We already requested pathology sample to be tested for mutations K-[place], BRAF and and BRAF, and the MSI expression, to see if patient would qualify for anti-EGFR  monoclonal antibodies.  In my opinion, anti-VEGF antibody is not a great choice for this patient at least for now because she is to his open wound requires vacuum suction.  I discussed with the patient for the side effects associated with those 2 different kinds of monoclonal antibodies.  At this point we are not sure whether she is a candidate for immunotherapy with anti-checkpoint inhibitor.      2 .  Pulmonary nodule, hypermetabolic on previous PET scan.   · Her PET scan examination from 8/8/2019 reported a small 8 mm right upper apex pulmonary nodule but hypermetabolic SUV 5.1.  That was too small to be biopsied, however there was always a possibility of metastatic disease considering that high activity despite a such a small nodule.  · Her chest CT scan examination from 3/13/2020 reported this shrank to 6 mm.    · PET scan examination on 5/21/2020 reported no metabolic activity at this residual nodule.  This needs to be monitored.    · PET scan examination 9/18/2020 reported couple of hypermetabolic pulmonary nodules, besides a few extra small pulmonary nodules.  Those are highly suspicious for metastatic disease.      3.   Pulmonary emboli with background of positive factor V Leiden mutation   · The patient has heterozygous factor V Leiden mutation and therefore was on low-dose anticoagulation with Lovenox, 40 mg daily given her increased risk of thrombosis with MediPort and GI malignancy.    · Nevertheless this patient was found to having pulmonary emboli on 9/20/2019 despite low-dose Lovenox.  She had a brief hospitalization in late September 2019, she was started on full dose Xarelto anticoagulation per protocol.    · The patient continues on Xarelto and is tolerating this well.  She is having some issues with slight nosebleeds as outlined above.   · No issues reported 9/28/2020.  Patient will continue Xarelto for now.      4.  This patient also has strong family history for malignancy the patient had  appointment with genetic counseling on September 3, 2019. NF1 [id number] >T.    5.  Mild anemia, improved since her surgery.    · She also has a history of iron deficiency.  Iron studies reveal a saturation of just 10%.  She was started on oral iron but was unable to tolerate due to GI side effects.   · Status post Injectafer 2/5/2020 and 2/12/2020   · Hemoglobin was within normal limits on 4/1/2020 at 13.1  · Hemoglobin 13.4 on 5/2/2020  · Hemoglobin is 13.4 on 5/13/2020  · Hemoglobin stable at 12.6 on 5/27/2020.  · Hemoglobin stable at 13.4 on 6/24/2020.  · Hemoglobin 10.7 on 8/12/2020, postop.    · Patient has improved and normalized hemoglobin 15.4 on 9/16/2020.      6.  Peripheral neuropathy secondary to chemotherapy.    · This is mild involving bilateral hands and feet as reported on 9/16/2020.   · Will avoid chemotherapy with oxaliplatin.    7.  Hand-foot syndrome grade 3.    · It become so significant after cycle #7 FOLFOX treatment.  Discussed with patient on 5/13/2020, will discontinue the bolus 5-FU dose, and also decrease 50% of the infusion dose through the pump.   · We also use Medrol Dosepak for possible recurrent symptomology.  She responded this well.   · Her hand-foot syndrome improved.  · For the FOLFIRI regimen, will not give bolus 5-FU, but will give full dose of 5-FU as calculated.    8.  Hyperlacrimation and mild scleral irritation related to 5-FU.  She has been taking steroid eyedrops.  This has improved her hyperlacrimation.  · Not a current issue.  However expecting this will recur once she has been started back on 5-FU treatment.    9.  Abnormal liver function panel.  · Improve the liver function panel 9/18/2020.  This is probably related to her recent infection.  · Continue to monitor.      PLAN:  1. Patient will return in 2 weeks for MD visit and routine labs, to start cycle #1 FOLFIRI with standard labs.  To be aware, there would be no bolus dose of 5-FU due to previous hand-foot syndrome.   I spoke with Dr. Ye, who preferred to postpone chemotherapy by 2 weeks because of the wound has not healed up yet, still requires vacuum suction.  2. We will arrange a teaching session for new chemotherapy regimen.  3. We will requested tumor sample to be sent for comprehensive NGS by [facility] laboratory.   4. Continue Xarelto 20 mg daily as prescribed.   5. I have asked the patient to call the office should she experience new or worsening symptoms.       Discussed with the patient today and reviewed imaging studies of PET scan from 9/18/2020, and compared with those from 5/21/2020 and those from 8/8/2019.    I discussed with surgeon Dr. Ye for further management plan.    I also discussed with radiation oncologist Dr. Lau for the management plan.    ALEXANDRU HUNT M.D., Ph.D.    9/28/2020       CC:  Deepika Vela III NP-C   MD Perla Bowers MD

## 2020-10-07 ENCOUNTER — OFFICE VISIT (OUTPATIENT)
Dept: ONCOLOGY | Facility: CLINIC | Age: 40
End: 2020-10-07

## 2020-10-07 DIAGNOSIS — C20 ADENOCARCINOMA OF RECTUM (HCC): Primary | ICD-10-CM

## 2020-10-07 DIAGNOSIS — C78.00 MALIGNANT NEOPLASM METASTATIC TO LUNG, UNSPECIFIED LATERALITY (HCC): ICD-10-CM

## 2020-10-07 PROCEDURE — 99442 PR PHYS/QHP TELEPHONE EVALUATION 11-20 MIN: CPT | Performed by: NURSE PRACTITIONER

## 2020-10-07 RX ORDER — ONDANSETRON HYDROCHLORIDE 8 MG/1
8 TABLET, FILM COATED ORAL 3 TIMES DAILY PRN
Qty: 60 TABLET | Refills: 5 | Status: SHIPPED | OUTPATIENT
Start: 2020-10-07 | End: 2021-10-22

## 2020-10-07 NOTE — PROGRESS NOTES
____________________PATIENT EDUCATION____________________    PATIENT EDUCATION:  Today I met with the patient via televisit to discuss the chemotherapy regimen recommended for treatment of her rectal cancer.  The patient was given explanation of treatment premed side effects including office policy that prohibits patients to drive if sedating medications are administered, MD explanation given regarding benefits, side effects, toxicities and goals of treatment.  The patient received a Chemotherapy/Biotherapy Plan Summary including diagnosis and specific treatment plan.    SIDE EFFECTS:  Common side effects were discussed with the patient and/or significant other.  Discussion included hair loss/discoloration, anemia/fatigue, infection/chills/fever, appetite, bleeding risk/precautions, constipation, diarrhea, mouth sores, taste alteration, loss of appetite,nausea/vomiting, peripheral neuropathy, skin/nail changes, rash, muscle aches/weakness, photosensitivity, weight gain/loss, hearing loss, dizziness, menopausal symptoms, menstrrual irregularity, sterility, high blood pressure, heart damage, liver damage, lung damage, kidney damage, DVT/PE risk, fluid retention, pleural/pericardial effusion, somnolence, electrolyte/LFT imbalance, vein exercises and/or the possible need for vascular access/port placement.  The patient was advice that although uncommon, leakage of an infused medication from the vein or venous access device (port) may lead to skin breakdown and/or other tissue damage.  The patient was advised that he/she may have pain, bleeding, and/or bruising from the insertion of a needle in their vein or venous access device (port).  The patient was further advised that, in spite of proper technique, infection with redness and irritation may rarely occur at the site where the needle was inserted.  The patient was advised that if complications occur, additional medical treatment is available.    Discussion also  included side effects specific to drugs in the treatment plan, specifically 5-FU, irinotecan, and leucovorin.    Patient has previously received chemotherapy in the form of FOLFOX and had difficulty with nausea, even vomiting while receiving treatment.  She received Emend with prior care plan and I will look into approval for Emend with treatment going forward with FOLFIRI.    PHYSICAL EXAM:  In no acute distress.  Awake, alert, appropriately verbal.  Breathing unlabored   No focal deficits.  Appropriate affect.  Asking appropriate questions.      A total of 20 minutes were spent with the patient, with 100% of time spent in education and counseling.    You have chosen to receive care through a telephone visit today. Do you consent to use a telephone visit for your medical care today? Yes     This visit has been rescheduled as a phone visit to comply with patient safety concerns in accordance with CDC recommendations. Total time of discussion was 20 minutes.

## 2020-10-09 ENCOUNTER — OFFICE VISIT (OUTPATIENT)
Dept: INTERNAL MEDICINE | Facility: CLINIC | Age: 40
End: 2020-10-09

## 2020-10-09 VITALS
TEMPERATURE: 97.5 F | WEIGHT: 210.6 LBS | SYSTOLIC BLOOD PRESSURE: 124 MMHG | DIASTOLIC BLOOD PRESSURE: 92 MMHG | HEART RATE: 96 BPM | OXYGEN SATURATION: 93 % | HEIGHT: 65 IN | BODY MASS INDEX: 35.09 KG/M2

## 2020-10-09 DIAGNOSIS — F41.9 ANXIETY: ICD-10-CM

## 2020-10-09 DIAGNOSIS — C20 ADENOCARCINOMA OF RECTUM (HCC): Primary | ICD-10-CM

## 2020-10-09 DIAGNOSIS — F33.9 MONOPOLAR DEPRESSION (HCC): ICD-10-CM

## 2020-10-09 PROCEDURE — 99214 OFFICE O/P EST MOD 30 MIN: CPT | Performed by: NURSE PRACTITIONER

## 2020-10-09 RX ORDER — CLONAZEPAM 1 MG/1
1 TABLET ORAL 3 TIMES DAILY PRN
Qty: 90 TABLET | Refills: 0 | Status: SHIPPED | OUTPATIENT
Start: 2020-10-09 | End: 2021-01-15 | Stop reason: SDUPTHER

## 2020-10-09 NOTE — PROGRESS NOTES
Name: Carole Weaver  :  1980    Subjective:      Chief Complaint   Patient presents with   • Cancer     rectal ca fu    • Anxiety        Carole Weaver is a 40 y.o. female patient.  She has multiple chronic medical conditions including: Rectal cancer, peripheral neuropathy, chronic anticoagulation (hetero Factor V Leiden), hypertension, anxiety, depression    Patient with rectal cancer 2019 T3N0, stage IIa.   She did have a small pulmonary nodule at that time. PET scan 2020 no evidence of metastatic disease.  The right upper lobe pulmonary nodule was stable.  She had a repeat CT 2020 that showed a new 8 mm noncalcified pulmonary nodule in left lower lobe.  PET scan 2020 showed multiple hypermetabolic small pulmonary nodules.     Lap diverting ileostomy 2019.    She's had a posterior leak.  Abdominopelvic abscess .  Currently has wound vac.   She will talk with Dr Ye on Monday if she is able to start new chemo Tuesday r/t new lung mets.     Her pain is been controlled.  She states she is hardly using pain medication.  Dr. Ye did give her 240 tablets of Norco on 2020.      Pulmonary emboli on 2019 while being treated with  low-dose Lovenox.  She is postivie for factor V Leiden (her mother also positive). Currently on  Xarelto anticoagulation. She is tolerating well, no s/sx bleeding.    Current smoker - she has decreased from 1 ppd to 1/2 ppd and not currently smoking.  States she doesn't need rx assist.     She has chronic anxiety.  She is on Lexapro 20 mg daily.  She has Klonopin 1 mg has only really been taken every few days.  Her anxiety is worse due to changes in her medical condition with new pulmonary nodules.  She will tried taking her Klonopin a little more frequently and let me know if she needs to change her regimen.  She would like counselors number to discuss her grief.    I have reviewed the patient's medical history in detail and updated  the computerized patient record.    Past Medical History:   Diagnosis Date   • Abnormal Pap smear of cervix 02/02/1998    JULIUS I   • Anemia in pregnancy    • Anxiety    • Bilateral epiphora 04/2020   • Bilateral hand swelling 05/02/2020    SEEN AT [facility] ER   • Cervical lymphadenitis 06/06/2012    SEEN AT [facility] ER   • Chemotherapy induced neutropenia (CMS/HCC) 04/2020   • Chemotherapy-induced nausea 02/2020   • Chemotherapy-induced thrombocytopenia 05/2020   • Chronic diarrhea    • Colon polyps     FOLLOWED BY DR. GERONIMO ESPARZA   • Cystitis 04/24/2020    WITH DEHYDRATION, ADMITTED TO [facility]   • Drug-induced peripheral neuropathy (CMS/HCC) 05/2020   • Fistula of intestine    • GERD (gastroesophageal reflux disease)    • Hand foot syndrome 05/2020   • Heart murmur     IN CHILDHOOD   • Hematochezia    • Hemorrhoids    • Heterozygous factor V Leiden mutation (CMS/[facility])     DX 8-2-2019   • History of anemia    • History of chemotherapy 2019    FOLLOWED BY DR. ALEXANDRU HUNT   • History of gestational diabetes    • History of pre-eclampsia    • History of radiation therapy 2019    FOLLOWED BY DR. JAVON LEWIS   • History of TB skin testing 01/17/2009    TB Skin Test   • HPV in female 1998   • Hypokalemia 09/2019   • Hypomagnesemia 09/2019   • IBS (irritable bowel syndrome)    • Ileostomy in place (CMS/[facility])     FOLLOWED BY DR. GERONIMO ESPARZA   • Infected insect bite of neck 05/27/2016    SEEN AT [facility]   • Kidney stones 08/09/2007    SEEN AT [facility] ER   • Lump of right breast     SWOLLEN LYMPH NODE   • Monopolar depression (CMS/HCC)    • On anticoagulant therapy    • PIH (pregnancy induced hypertension) 1998   • Pulmonary embolism without acute cor pulmonale (CMS/HCC) 09/20/2019    X 3; ADMITTED TO [facility]   • Pulmonary nodule, right 05/2020   • Rectal cancer (CMS/HCC) 07/08/2019    INVASIVE MODERATELY DIFFERENTIATED ADENOCARCINOMA, CHEMO AND XRT FINISHED 9/2019   • Right ureteral stone 10/01/2019    SEEN AT [facility] ER   • SAB (spontaneous  ) 1996     A2-1 INDUCED   • Sciatica    • Sepsis due to cellulitis (CMS/HCC) 2002    LEFT GREAT TOE, ADMITTED TO [facility]   • Tachycardia 2020   • Urinary urgency 2020       Past Surgical History:   Procedure Laterality Date   • CHOLECYSTECTOMY N/A 10/10/2003    LAPAROSCOPIC WITH CHOLANGIOGRAM, DR. JAMEY TALAVERA AT [facility]   • COLON RESECTION N/A 2019    Procedure: laparoscopic low anterior colon resection with mobilization of splenic flexure and diverting loop ileostomy: ERAS;  Surgeon: Padmaja Esparza MD;  Location: [facility];  Service: General   • COLON RESECTION N/A 8/3/2020    Procedure: LOW ANTERIOR COLON RESECTION, COLOANAL ANASTOMOSIS, MOBILIZATION SPLENIC FLEXURE;  Surgeon: Padmaja Esparza MD;  Location: [facility];  Service: General;  Laterality: N/A;   • COLONOSCOPY N/A 7/15/2020    PATENT ANASTAMOSIS IN MID RECTUM, RESCOPE IN 1 YR, DR. PADMAJA ESPARZA AT [facility]   • COLONOSCOPY W/ POLYPECTOMY N/A 2019    15 MM TUBULOVILLOUS ADENOMA POLYP IN HEPATIC FLEXURE, 20 MMTUBULOVILLOUS ADENOMA WITH HIGH GRADE DYSPLASIA IN RECTOSIGMOID, 4 CM MASS IN MID RECTUM, PATH: INVASIVE MODERATELY DIFFERENTIATED ADENOCARCINOMA, DR. JENNIFER LI AT [facility]   • DILATATION AND EVACUATION N/A    • ENDOSCOPY N/A 2019    LA GRADE A ESOPHAGITIS, GASTRITIS, ALL BIOPSIES BENIGN, DR. JENNIFER LI AT [facility]   • INCISION AND DRAINAGE PERIRECTAL ABSCESS N/A 2020    Procedure: INCISION AND DRAINAGE OF retrorectal dehiscence abcess with drain placement and irrigation;  Surgeon: Padmaja Esparza MD;  Location: [facility];  Service: General;  Laterality: N/A;   • PAP SMEAR N/A 2014   • SIGMOIDOSCOPY N/A 2019    INFILTRATIVE PARTIALLY OBSTRUCTING LARGE RECTAL CANCER, AREA TATOOED, DR. PADMAJA ESPARZA AT [facility]   • SIGMOIDOSCOPY N/A 2019    AN ULCERATED PARTIALLY OBSTRUCTING MASS IN MID RECTUM, AREA TATTOOED, DR. PADMAJA ESPARZA AT [facility]   •  TRANSRECTAL ULTRASOUND N/A 7/24/2019    Procedure: ULTRASOUND TRANSRECTAL;  Surgeon: Padmaja Ye MD;  Location: [facility] ENDOSCOPY;  Service: General   • URETEROSCOPY LASER LITHOTRIPSY WITH STENT INSERTION Right 10/30/2019    DR. ESTUARDO BEASLEY AT [place]   • VAGINAL DELIVERY N/A 09/18/1998   • VENOUS ACCESS DEVICE (PORT) INSERTION Left 8/9/2019    Procedure: INSERTION VENOUS ACCESS DEVICE;  Surgeon: Sj Joseph MD;  Location: [facility];  Service: General   • WISDOM TOOTH EXTRACTION Bilateral 1993       Family History   Problem Relation Age of Onset   • Hyperlipidemia Mother    • Colon polyps Mother    • Heart disease Mother    • Hypertension Mother    • Factor V Leiden deficiency Mother    • Skin cancer Father         Squamous in 60s   • Heart disease Paternal Grandfather    • No Known Problems Son    • Cancer Paternal Uncle    • Colon cancer Paternal Uncle    • Diabetes Paternal Uncle    • Mental illness Sister         Addiction   • Colon cancer Maternal Uncle    • Malig Hyperthermia Neg Hx        Social History     Socioeconomic History   • Marital status:      Spouse name: Justus   • Number of children: 1   • Years of education: College   • Highest education level: Not on file   Occupational History   • Occupation:      Comment: [employer]     Employer: [employer]   Tobacco Use   • Smoking status: Heavy Tobacco Smoker     Packs/day: 1.00     Years: 24.00     Pack years: 24.00     Types: Electronic Cigarette, Cigarettes   • Smokeless tobacco: Never Used   • Tobacco comment: LAST CIGARETTE 8/12/2020   Substance and Sexual Activity   • Alcohol use: Not Currently   • Drug use: No   • Sexual activity: Defer       Most Recent Immunizations   Administered Date(s) Administered   • flucelvax quad pfs =>4 YRS 11/08/2018         Review of Systems:   Review of Systems   Constitutional: Negative for chills, fever and unexpected weight change.   Respiratory: Negative for shortness of  "breath.    Cardiovascular: Negative for chest pain and leg swelling.   Skin: Positive for wound.        Wound VAC on   Psychiatric/Behavioral: Positive for dysphoric mood. The patient is nervous/anxious.          Objective:      Physical Exam:   Physical Exam  Vitals signs reviewed.   Constitutional:       Appearance: She is well-developed.   HENT:      Head: Normocephalic.      Comments: Wearing mask due to COVID   Eyes:      Conjunctiva/sclera: Conjunctivae normal.      Pupils: Pupils are equal, round, and reactive to light.   Neck:      Musculoskeletal: Normal range of motion and neck supple.      Thyroid: No thyromegaly.   Cardiovascular:      Rate and Rhythm: Normal rate and regular rhythm.      Pulses: Normal pulses.      Heart sounds: Normal heart sounds.   Pulmonary:      Effort: Pulmonary effort is normal.      Breath sounds: Normal breath sounds.      Comments: E/U   Abdominal:      General: Bowel sounds are normal.      Palpations: Abdomen is soft.      Comments: Abdominal wound with wound VAC.  Did not take down.    Musculoskeletal: Normal range of motion.   Lymphadenopathy:      Cervical: No cervical adenopathy.   Skin:     General: Skin is warm and dry.      Capillary Refill: Capillary refill takes 2 to 3 seconds.   Neurological:      Mental Status: She is alert and oriented to person, place, and time.   Psychiatric:         Mood and Affect: Mood is depressed.         Behavior: Behavior normal. Behavior is cooperative.         Thought Content: Thought content normal.         Judgment: Judgment normal.           Vital Signs:  Vitals:    10/09/20 1425   BP: 124/92   BP Location: Right arm   Patient Position: Sitting   Cuff Size: Adult   Pulse: 96   Temp: 97.5 °F (36.4 °C)   TempSrc: Oral   SpO2: 93%   Weight: 95.5 kg (210 lb 9.6 oz)   Height: 165.1 cm (65\")     Body mass index is 35.05 kg/m².      Results Review:      REVIEWED AND DISCUSSED LAB RESULTS WITH PATIENT      Requested Prescriptions     Signed " Prescriptions Disp Refills   • clonazePAM (KlonoPIN) 1 MG tablet 90 tablet 0     Sig: Take 1 tablet by mouth 3 (Three) Times a Day As Needed for Anxiety or Seizures.     Routine medications provided by this office will also be refilled via pharmacy request.       Current Outpatient Medications:   •  clonazePAM (KlonoPIN) 1 MG tablet, Take 1 tablet by mouth 3 (Three) Times a Day As Needed for Anxiety or Seizures., Disp: 90 tablet, Rfl: 0  •  dicyclomine (BENTYL) 20 MG tablet, TAKE 1 TABLET BY MOUTH EVERY 6 HOURS AS NEEDED, Disp: 120 tablet, Rfl: 2  •  escitalopram (LEXAPRO) 20 MG tablet, Take 1 tablet by mouth Daily., Disp: 90 tablet, Rfl: 3  •  famotidine (PEPCID) 20 MG tablet, TAKE 1 TABLET BY MOUTH TWICE A DAY (Patient taking differently: Take 20 mg by mouth Every Evening.), Disp: 180 tablet, Rfl: 1  •  HYDROcodone-acetaminophen (NORCO) 7.5-325 MG per tablet, Take 1 tablet by mouth Every 6 (Six) Hours As Needed for Moderate Pain ., Disp: 240 tablet, Rfl: 0  •  Loratadine (CLARITIN) 10 MG capsule, Take 10 mg by mouth Every Evening., Disp: , Rfl:   •  ondansetron (ZOFRAN) 8 MG tablet, Take 1 tablet by mouth 3 (Three) Times a Day As Needed for Nausea or Vomiting., Disp: 60 tablet, Rfl: 5  •  prochlorperazine (COMPAZINE) 10 MG tablet, Take 1 tablet by mouth Every 6 (Six) Hours As Needed for Nausea or Vomiting., Disp: 30 tablet, Rfl: 2  •  rivaroxaban (XARELTO) 20 MG tablet, Take 1 tablet by mouth Daily. Start after finishing starter pack. (Patient taking differently: Take 20 mg by mouth Daily. HOLDING 2 DAYS PRIOR TO SURGERY), Disp: 30 tablet, Rfl: 11       Assessment and Plan:        Problem List Items Addressed This Visit        Digestive    Adenocarcinoma of rectum (CMS/HCC) - Primary       Other    Anxiety    Relevant Medications    clonazePAM (KlonoPIN) 1 MG tablet    Monopolar depression (CMS/HCC)        Rectal cancer with mets to lung.  She will possibly start a new round of chemo on Tuesday.   Pain is  "controlled.  Depression / anxiety not well controlled.  She will take Klonopin daily.  She will let me know if we need to change her regimen, if not improved.  Also probably making it worse as her quitting smoking.       Discussed any change in Rx and discussed visit with patient.  All questions answered.      Return in about 3 months (around 1/9/2021).    Red \"Miller\" Mirian, JULIET   10/11/20    Dragon disclaimer:   Much of this encounter note is an electronic transcription/translation of spoken language to printed text. The electronic translation of spoken language may permit erroneous, or at times, nonsensical words or phrases to be inadvertently transcribed; Although I have reviewed the note for such errors, some may still exist.     Additional Patient Counseling:       There are no Patient Instructions on file for this visit.  "

## 2020-10-12 ENCOUNTER — OFFICE VISIT (OUTPATIENT)
Dept: SURGERY | Facility: CLINIC | Age: 40
End: 2020-10-12

## 2020-10-12 VITALS
DIASTOLIC BLOOD PRESSURE: 78 MMHG | OXYGEN SATURATION: 98 % | WEIGHT: 211.2 LBS | SYSTOLIC BLOOD PRESSURE: 112 MMHG | BODY MASS INDEX: 33.94 KG/M2 | HEART RATE: 107 BPM | TEMPERATURE: 97.3 F | HEIGHT: 66 IN

## 2020-10-12 DIAGNOSIS — L02.91 SOFT TISSUE ABSCESS: Primary | ICD-10-CM

## 2020-10-12 PROCEDURE — 99024 POSTOP FOLLOW-UP VISIT: CPT | Performed by: COLON & RECTAL SURGERY

## 2020-10-12 RX ORDER — HEPARIN SODIUM (PORCINE) LOCK FLUSH IV SOLN 100 UNIT/ML 100 UNIT/ML
500 SOLUTION INTRAVENOUS AS NEEDED
Status: CANCELLED | OUTPATIENT
Start: 2020-10-12

## 2020-10-12 RX ORDER — SODIUM CHLORIDE 0.9 % (FLUSH) 0.9 %
10 SYRINGE (ML) INJECTION AS NEEDED
Status: CANCELLED | OUTPATIENT
Start: 2020-10-12

## 2020-10-13 ENCOUNTER — INFUSION (OUTPATIENT)
Dept: ONCOLOGY | Facility: HOSPITAL | Age: 40
End: 2020-10-13

## 2020-10-13 ENCOUNTER — OFFICE VISIT (OUTPATIENT)
Dept: ONCOLOGY | Facility: CLINIC | Age: 40
End: 2020-10-13

## 2020-10-13 VITALS
BODY MASS INDEX: 35.04 KG/M2 | OXYGEN SATURATION: 96 % | HEIGHT: 65 IN | DIASTOLIC BLOOD PRESSURE: 82 MMHG | RESPIRATION RATE: 16 BRPM | SYSTOLIC BLOOD PRESSURE: 114 MMHG | HEART RATE: 92 BPM | TEMPERATURE: 97.5 F | WEIGHT: 210.3 LBS

## 2020-10-13 DIAGNOSIS — C20 ADENOCARCINOMA OF RECTUM (HCC): Primary | ICD-10-CM

## 2020-10-13 DIAGNOSIS — T45.1X5A CHEMOTHERAPY INDUCED DIARRHEA: ICD-10-CM

## 2020-10-13 DIAGNOSIS — K52.1 CHEMOTHERAPY INDUCED DIARRHEA: ICD-10-CM

## 2020-10-13 DIAGNOSIS — C20 ADENOCARCINOMA OF RECTUM (HCC): ICD-10-CM

## 2020-10-13 LAB
ALBUMIN SERPL-MCNC: 3.7 G/DL (ref 3.5–5.2)
ALBUMIN/GLOB SERPL: 0.9 G/DL (ref 1.1–2.4)
ALP SERPL-CCNC: 116 U/L (ref 38–116)
ALT SERPL W P-5'-P-CCNC: 35 U/L (ref 0–33)
ANION GAP SERPL CALCULATED.3IONS-SCNC: 9.5 MMOL/L (ref 5–15)
AST SERPL-CCNC: 20 U/L (ref 0–32)
BASOPHILS # BLD AUTO: 0.02 10*3/MM3 (ref 0–0.2)
BASOPHILS NFR BLD AUTO: 0.4 % (ref 0–1.5)
BILIRUB SERPL-MCNC: <0.2 MG/DL (ref 0.2–1.2)
BUN SERPL-MCNC: 7 MG/DL (ref 6–20)
BUN/CREAT SERPL: 9.7 (ref 7.3–30)
CALCIUM SPEC-SCNC: 9.2 MG/DL (ref 8.5–10.2)
CHLORIDE SERPL-SCNC: 103 MMOL/L (ref 98–107)
CO2 SERPL-SCNC: 23.5 MMOL/L (ref 22–29)
CREAT SERPL-MCNC: 0.72 MG/DL (ref 0.6–1.1)
DEPRECATED RDW RBC AUTO: 44.3 FL (ref 37–54)
EOSINOPHIL # BLD AUTO: 0.2 10*3/MM3 (ref 0–0.4)
EOSINOPHIL NFR BLD AUTO: 3.6 % (ref 0.3–6.2)
ERYTHROCYTE [DISTWIDTH] IN BLOOD BY AUTOMATED COUNT: 13.1 % (ref 12.3–15.4)
GFR SERPL CREATININE-BSD FRML MDRD: 89 ML/MIN/1.73
GLOBULIN UR ELPH-MCNC: 3.9 GM/DL (ref 1.8–3.5)
GLUCOSE SERPL-MCNC: 107 MG/DL (ref 74–124)
HCT VFR BLD AUTO: 43 % (ref 34–46.6)
HGB BLD-MCNC: 13.9 G/DL (ref 12–15.9)
IMM GRANULOCYTES # BLD AUTO: 0.01 10*3/MM3 (ref 0–0.05)
IMM GRANULOCYTES NFR BLD AUTO: 0.2 % (ref 0–0.5)
LYMPHOCYTES # BLD AUTO: 1 10*3/MM3 (ref 0.7–3.1)
LYMPHOCYTES NFR BLD AUTO: 18.1 % (ref 19.6–45.3)
MCH RBC QN AUTO: 29.4 PG (ref 26.6–33)
MCHC RBC AUTO-ENTMCNC: 32.3 G/DL (ref 31.5–35.7)
MCV RBC AUTO: 91.1 FL (ref 79–97)
MONOCYTES # BLD AUTO: 0.46 10*3/MM3 (ref 0.1–0.9)
MONOCYTES NFR BLD AUTO: 8.3 % (ref 5–12)
NEUTROPHILS NFR BLD AUTO: 3.83 10*3/MM3 (ref 1.7–7)
NEUTROPHILS NFR BLD AUTO: 69.4 % (ref 42.7–76)
NRBC BLD AUTO-RTO: 0 /100 WBC (ref 0–0.2)
PLATELET # BLD AUTO: 302 10*3/MM3 (ref 140–450)
PMV BLD AUTO: 8.5 FL (ref 6–12)
POTASSIUM SERPL-SCNC: 4.2 MMOL/L (ref 3.5–4.7)
PROT SERPL-MCNC: 7.6 G/DL (ref 6.3–8)
RBC # BLD AUTO: 4.72 10*6/MM3 (ref 3.77–5.28)
SODIUM SERPL-SCNC: 136 MMOL/L (ref 134–145)
WBC # BLD AUTO: 5.52 10*3/MM3 (ref 3.4–10.8)

## 2020-10-13 PROCEDURE — 80053 COMPREHEN METABOLIC PANEL: CPT

## 2020-10-13 PROCEDURE — 25010000002 IRINOTECAN PER 20 MG: Performed by: INTERNAL MEDICINE

## 2020-10-13 PROCEDURE — 25010000002 FOSAPREPITANT PER 1 MG: Performed by: INTERNAL MEDICINE

## 2020-10-13 PROCEDURE — 85025 COMPLETE CBC W/AUTO DIFF WBC: CPT

## 2020-10-13 PROCEDURE — 99215 OFFICE O/P EST HI 40 MIN: CPT | Performed by: INTERNAL MEDICINE

## 2020-10-13 PROCEDURE — 96375 TX/PRO/DX INJ NEW DRUG ADDON: CPT

## 2020-10-13 PROCEDURE — 25010000002 ATROPINE PER 0.01 MG: Performed by: INTERNAL MEDICINE

## 2020-10-13 PROCEDURE — 25010000002 LEUCOVORIN CALCIUM PER 50 MG: Performed by: INTERNAL MEDICINE

## 2020-10-13 PROCEDURE — 96367 TX/PROPH/DG ADDL SEQ IV INF: CPT

## 2020-10-13 PROCEDURE — 96415 CHEMO IV INFUSION ADDL HR: CPT

## 2020-10-13 PROCEDURE — 96368 THER/DIAG CONCURRENT INF: CPT

## 2020-10-13 PROCEDURE — 25010000002 PALONOSETRON PER 25 MCG: Performed by: INTERNAL MEDICINE

## 2020-10-13 PROCEDURE — 96416 CHEMO PROLONG INFUSE W/PUMP: CPT

## 2020-10-13 PROCEDURE — 96413 CHEMO IV INFUSION 1 HR: CPT

## 2020-10-13 PROCEDURE — 25010000002 DEXAMETHASONE SODIUM PHOSPHATE 100 MG/10ML SOLUTION: Performed by: INTERNAL MEDICINE

## 2020-10-13 PROCEDURE — 25010000002 FLUOROURACIL PER 500 MG: Performed by: INTERNAL MEDICINE

## 2020-10-13 RX ORDER — ATROPINE SULFATE 0.4 MG/ML
0.25 AMPUL (ML) INJECTION
Status: DISCONTINUED | OUTPATIENT
Start: 2020-10-13 | End: 2020-10-13 | Stop reason: HOSPADM

## 2020-10-13 RX ORDER — SODIUM CHLORIDE 9 MG/ML
250 INJECTION, SOLUTION INTRAVENOUS ONCE
Status: CANCELLED | OUTPATIENT
Start: 2020-10-13

## 2020-10-13 RX ORDER — PALONOSETRON 0.05 MG/ML
0.25 INJECTION, SOLUTION INTRAVENOUS ONCE
Status: CANCELLED | OUTPATIENT
Start: 2020-10-13

## 2020-10-13 RX ORDER — PALONOSETRON 0.05 MG/ML
0.25 INJECTION, SOLUTION INTRAVENOUS ONCE
Status: COMPLETED | OUTPATIENT
Start: 2020-10-13 | End: 2020-10-13

## 2020-10-13 RX ORDER — ATROPINE SULFATE 1 MG/ML
0.25 INJECTION, SOLUTION INTRAMUSCULAR; INTRAVENOUS; SUBCUTANEOUS
Status: CANCELLED | OUTPATIENT
Start: 2020-10-13

## 2020-10-13 RX ORDER — SODIUM CHLORIDE 9 MG/ML
250 INJECTION, SOLUTION INTRAVENOUS ONCE
Status: COMPLETED | OUTPATIENT
Start: 2020-10-13 | End: 2020-10-13

## 2020-10-13 RX ADMIN — SODIUM CHLORIDE 250 ML: 9 INJECTION, SOLUTION INTRAVENOUS at 09:20

## 2020-10-13 RX ADMIN — ATROPINE SULFATE 0.25 MG: 0.4 INJECTION, SOLUTION INTRAMUSCULAR; INTRAVENOUS; SUBCUTANEOUS at 10:20

## 2020-10-13 RX ADMIN — FLUOROURACIL 4850 MG: 50 INJECTION, SOLUTION INTRAVENOUS at 12:08

## 2020-10-13 RX ADMIN — SODIUM CHLORIDE 810 MG: 900 INJECTION, SOLUTION INTRAVENOUS at 10:25

## 2020-10-13 RX ADMIN — DEXTROSE MONOHYDRATE 365 MG: 50 INJECTION, SOLUTION INTRAVENOUS at 10:25

## 2020-10-13 RX ADMIN — SODIUM CHLORIDE 100 ML: 9 INJECTION, SOLUTION INTRAVENOUS at 09:32

## 2020-10-13 RX ADMIN — DEXAMETHASONE SODIUM PHOSPHATE 12 MG: 10 INJECTION, SOLUTION INTRAMUSCULAR; INTRAVENOUS at 09:17

## 2020-10-13 RX ADMIN — PALONOSETRON 0.25 MG: 0.05 INJECTION, SOLUTION INTRAVENOUS at 09:16

## 2020-10-13 NOTE — PROGRESS NOTES
REASON FOR FOLLOW UP:     1. Rectal cancer, rectal ultrasound examination in July 2019 reported T3N0 disease without lymphadenopathy. She does have small pulmonary nodule 6-7 mm and 2 mm with indeterminate feature. There is no solid evidence of metastatic disease otherwise. Patient has stage IIA (T3N0M0) disease.  2. The patient is heterozygous factor V Leiden, was on prophylactic anticoagulation with Lovenox 40 mg daily given her increased risk of thrombosis with MediPort and GI malignancy.   3. PET scan on 8/8/2019 reported an 8 mm hypermetabolic right upper lobe pulmonary nodule, which is suspicious for metastatic as well.    4. Patient had a PowerPort placement on the left upper chest by Dr. Joseph on 8/9/2019.  5. Patient was started on concurrent infusional 5-FU chemoradiation therapy on 8/12/2019, with planned complete surgical resection and further adjuvant chemotherapy with intention to cure the disease.   6. Patient underwent surgical resection of the primary rectal cancer by Dr. Ye on 12/2/2019.  Pathology evaluation reported residual T3N1 disease stage IIIb.  7. Diarrhea related to therapy and radiation.   8. Patient was started cycle 1 day 1 of adjuvant FOLFOX 6 on 1/23/2020.  9. On 2/5/2020 FOLFOX 6 cycle 2 delayed secondary to neutropenia.  Patient was given 3 days of Granix injection.  After cycle #2 we planned 3 days of Granix with each cycle.   10. Patient also intolerant of oral iron.  Patient received 2 doses of IV injectafer on 02/05/2020 and 02/12/2020.   11. 02/12/2020 Proceed with cycle #2 of FOLFOX 6 with 3 days of Granix.  12. On 3/11/2020 cycle 4 postponed secondary to abdominal pain and occasional low-grade fevers.  CT scans ordered.  13. Cycle #4 resumed after CT scan revealed no evidence of disease.  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 Ye. patient has no fever.  Continue to observe.   14. Grade 3  hand-foot syndrome secondary to 5-FU after cycle #7 FOLFOX 6 chemotherapy, prompting ER visit.  Also has worsening peripheral neuropathy.   15. Cycle #8 FOLFOX 6 was given on 5/13/2020, with 50% dose reduction for both 5-FU and oxaliplatin, and also examination of bolus 5-FU.   16. PET scan examination on 5/21/2020 reported no evidence of metastatic disease in the chest abdomen pelvis.  Stable 6 mm RUL pulmonary nodule.  17. On 5/27/2020, I discussed with the patient that we can discontinue chemotherapy at this time.   18. Patient had a surgical procedure for low anterior colon resection, coloanal anastomosis on 8/3/2020.  19. CT scan for chest abdomen pelvis on 9/9/2020 reported a new 8 mm noncalcified pulmonary nodule in the left lower lobe of the lung.  Otherwise stable right upper lobe 6 mm pulmonary nodule, and no evidence of disease recurrence in the abdomen or pelvis.  20. PET scan examination on 9/18/2020 reported multiple hypermetabolic small pulmonary nodules/ new pulmonary nodules.   21. Patient will be started on pelvic chemotherapy with FOLFIRI regimen on 10/13/2020.   22. Further genetic study is pending.     HISTORY OF PRESENT ILLNESS:  The patient is a 40 y.o. female with the above medical history who presents today for evaluation prior to the initiation of palliative chemotherapy with FOLFIRI regimen.    Patient reports no fever no sweating no chills.  However the morning is much improved, still requires wound VAC but overall has been better.  Her surgeon Dr. Ye already cleared the patient for starting chemotherapy.    Patient remains performance status ECOG 1.  She continues to work part-time in office, and part-time at home.  She does have visiting nurse come to the home for wound care.    Laboratory study today on 10/13/2020 reported normal CBC with Hb 13.9, platelets 302,000 and a WBC 5520 including ANC 3830.  Chemistry lab reported normalized AST 20, alk phosphatase 116 improved ALT 35, and  maintains normal bilirubin, with normal electrolytes and liver function panel.     We will proceed ahead with cycle #1 palliative chemotherapy FOLFIRI.     Past Medical History:   Diagnosis Date   • Abnormal Pap smear of cervix 02/02/1998    JULIUS I   • Anemia in pregnancy    • Anxiety    • Bilateral epiphora 04/2020   • Bilateral hand swelling 05/02/2020    SEEN AT [facility] ER   • Cervical lymphadenitis 06/06/2012    SEEN AT [facility] ER   • Chemotherapy induced neutropenia (CMS/HCC) 04/2020   • Chemotherapy-induced nausea 02/2020   • Chemotherapy-induced thrombocytopenia 05/2020   • Chronic diarrhea    • Colon polyps     FOLLOWED BY DR. GERONIMO ESPARZA   • Cystitis 04/24/2020    WITH DEHYDRATION, ADMITTED TO [facility]   • Drug-induced peripheral neuropathy (CMS/HCC) 05/2020   • Fistula of intestine    • GERD (gastroesophageal reflux disease)    • Hand foot syndrome 05/2020   • Heart murmur     IN CHILDHOOD   • Hematochezia    • Hemorrhoids    • Heterozygous factor V Leiden mutation (CMS/HCC)     DX 8-2-2019   • History of anemia    • History of chemotherapy 2019    FOLLOWED BY DR. ALEXANDRU HUNT   • History of gestational diabetes    • History of pre-eclampsia    • History of radiation therapy 2019    FOLLOWED BY DR. JAVON LEWIS   • History of TB skin testing 01/17/2009    TB Skin Test   • HPV in female 1998   • Hypokalemia 09/2019   • Hypomagnesemia 09/2019   • IBS (irritable bowel syndrome)    • Ileostomy in place (CMS/[facility])     FOLLOWED BY DR. GERONIMO ESPARZA   • Infected insect bite of neck 05/27/2016    SEEN AT [facility]   • Kidney stones 08/09/2007    SEEN AT [facility] ER   • Lump of right breast     SWOLLEN LYMPH NODE   • Monopolar depression (CMS/HCC)    • On anticoagulant therapy    • PIH (pregnancy induced hypertension) 1998   • Pulmonary embolism without acute cor pulmonale (CMS/HCC) 09/20/2019    X 3; ADMITTED TO [facility]   • Pulmonary nodule, right 05/2020   • Rectal cancer (CMS/HCC) 07/08/2019    INVASIVE MODERATELY DIFFERENTIATED  ADENOCARCINOMA, CHEMO AND XRT FINISHED 2019   • Right ureteral stone 10/01/2019    SEEN AT [facility]   • SAB (spontaneous ) 1996     A2-1 INDUCED   • Sciatica    • Sepsis due to cellulitis (CMS/HCC) 2002    LEFT GREAT TOE, ADMITTED TO [facility]   • Tachycardia 2020   • Urinary urgency 2020     Past Surgical History:   Procedure Laterality Date   • CHOLECYSTECTOMY N/A 10/10/2003    LAPAROSCOPIC WITH CHOLANGIOGRAM, DR. JAMEY TALAVERA AT [facility]   • COLON RESECTION N/A 2019    Procedure: laparoscopic low anterior colon resection with mobilization of splenic flexure and diverting loop ileostomy: ERAS;  Surgeon: Padmaja Esparza MD;  Location: [facility] MAIN OR;  Service: General   • COLON RESECTION N/A 8/3/2020    Procedure: LOW ANTERIOR COLON RESECTION, COLOANAL ANASTOMOSIS, MOBILIZATION SPLENIC FLEXURE;  Surgeon: Padmaja Esparza MD;  Location: [facility] MAIN OR;  Service: General;  Laterality: N/A;   • COLONOSCOPY N/A 7/15/2020    PATENT ANASTAMOSIS IN MID RECTUM, RESCOPE IN 1 YR, DR. PADMAJA ESPARZA AT [facility]   • COLONOSCOPY W/ POLYPECTOMY N/A 2019    15 MM TUBULOVILLOUS ADENOMA POLYP IN HEPATIC FLEXURE, 20 MMTUBULOVILLOUS ADENOMA WITH HIGH GRADE DYSPLASIA IN RECTOSIGMOID, 4 CM MASS IN MID RECTUM, PATH: INVASIVE MODERATELY DIFFERENTIATED ADENOCARCINOMA, DR. JENNIFER LI AT [facility]   • DILATATION AND EVACUATION N/A    • ENDOSCOPY N/A 2019    LA GRADE A ESOPHAGITIS, GASTRITIS, ALL BIOPSIES BENIGN, DR. JENNIFER LI AT [facility]   • INCISION AND DRAINAGE PERIRECTAL ABSCESS N/A 2020    Procedure: INCISION AND DRAINAGE OF retrorectal dehiscence abcess with drain placement and irrigation;  Surgeon: Padmaja Esparza MD;  Location: [facility] MAIN OR;  Service: General;  Laterality: N/A;   • PAP SMEAR N/A 2014   • SIGMOIDOSCOPY N/A 2019    INFILTRATIVE PARTIALLY OBSTRUCTING LARGE RECTAL CANCER, AREA TATOOED, DR. PADMAJA ESPARZA AT [facility]   • SIGMOIDOSCOPY N/A  11/23/2019    AN ULCERATED PARTIALLY OBSTRUCTING MASS IN MID RECTUM, AREA TATTOOED, DR. GERONIMO YE AT [facility]   • TRANSRECTAL ULTRASOUND N/A 7/24/2019    Procedure: ULTRASOUND TRANSRECTAL;  Surgeon: Geronimo Ye MD;  Location: [facility] ENDOSCOPY;  Service: General   • URETEROSCOPY LASER LITHOTRIPSY WITH STENT INSERTION Right 10/30/2019    DR. ESTUARDO BEASLEY AT [place]   • VAGINAL DELIVERY N/A 09/18/1998   • VENOUS ACCESS DEVICE (PORT) INSERTION Left 8/9/2019    Procedure: INSERTION VENOUS ACCESS DEVICE;  Surgeon: Sj Joseph MD;  Location:  [facility];  Service: General   • WISDOM TOOTH EXTRACTION Bilateral 1993       HEMATOLOGIC/ONCOLOGIC HISTORY:      The patient reports she has intermittent rectal bleeding and urgency, mucous with her stool, starting sometime in 2016. At that time she was referred to GI service, and was diagnosed of irritable bowel syndrome. Nevertheless she had worsening urgency for bowel movement, and had a couple of incidents losing control of stool. She was recently seen by Roland Thorpe MD again and had colonoscopy and EGD exam on 07/08/2019. She was found having a circumferential rectal mass. According to the procedure note, the patient had a fungating circumferential bleeding 4 cm mass in the middle rectum, at distance between 13 cm and 17 cm from the anus. Mass was causing partial obstruction. There were also colon polyps found at the hepatic flexure and also at the rectosigmoid junction 23 cm from the anus. Both were resected and retrieved. EGD examination reported grade A esophagitis at the GE junction and patchy discontinuous erythema and aggravation of the mucosa without bleeding in the stomach body. There is normal mucosa of the duodenum. Pathology evaluation from this procedure reported moderately differentiated adenocarcinoma involving the rectal mass. The rectal sigmoid junction polyp was tubular/tubulovillous adenoma with high grade dysplasia negative for invasive  malignancy. The hepatic flexure polyp was also tubular/tubulovillous adenoma negative for high grade dysplasia or malignancy. The biopsy from the esophagus reports squamocolumnar mucosa with inflammatory changes suggestive of mild reflux esophagitis, negative for interstitial metaplasia. Gastric biopsy was benign and duodenal biopsy was also benign. There is no mention of H-pylori.     Patient was subsequently referred to colorectal surgeon Padmaja Ye MD for further evaluation. The patient had CT scan examination for chest, abdomen and pelvis requested by Dr. Ye and were done on 07/13/2019. The study reported no evidence of lymphadenopathy in the abdomen and pelvis. There was wall thickening of the rectosigmoid junction. Normal spleen, pancreas, adrenal glands and kidneys. There was fatty liver infiltration but no focal lymphatic lesions. There was a small 6-7 mm noncalcified nodule in the right upper lobe and a tiny 3 mm subpleural nodule in the right middle lobe. No mediastinal or hilar lymphadenopathy.     Dr. Ye performed staging rectal ultrasound examination. This study reported tumor penetrating through the muscularis propria as T3 disease; there were no lymph nodes identified.    She had a hospitalization in late September 2019 because of newly discovered pulmonary emboli.  She was on prophylactic Lovenox prior to the incident of PE.  Now she is on full dose Xarelto anticoagulation.  Patient reports no further chest pain dyspnea.  She denies bleeding or bruising.  During her hospitalization, she was seen by Dr. Ye, who plans to have surgery 8 to 12 weeks after finishing radiation therapy.  She finished her radiation on 9/19/2019.     I noticed patient also presented to the emergency room on 10/1/2019 complaining of right flank area pain.  I reviewed the images studies and indeed she has a very small 1 to 2 mm obstructing kidney stone in the UV junction.  Patient is still symptomatic with some  pains and dysuria.  She denies fever sweating or chills.    Laboratory study on 10/7/2019 reported normal CBC including hemoglobin 13.1, platelets 301,000, WBC 6170 and ANC 4900 lymphocytes 590 monocytes 480.      The nurse reported malfunction of port-a-catheter on 10/7/2019.  Port study on 10/8/2019 showed fibrin sheath around the distal aspect of the Mediport catheter in the SVC. This does not appear to hinder injection, but does prevent aspiration at this time.    Patient underwent surgical resection of the colon on 12/2/2019 with Dr. Ye.  Pathology evaluation reported residual T3 disease, also 1 out of 14 lymph nodes positive for malignancy.  So this patient in either had at least stage IIIb disease (T3 N1 M0?).      Adjuvant chemotherapy FOLFOX 6 will be started on 1/22/2020.  Laboratory study reported iron saturation 10%, free iron 31 TIBC 319 and ferritin 168.  Her hemoglobin was 11.8, WBC 5600, and platelets 347,000.    Patient was here on 02/12/2020 for cycle 2 of her FOLFOX.  This is delayed x1 week secondary to neutropenia.  The patient ultimately received 3 days worth of Neupogen with recovery of her blood counts.  Of note, the patient struggled with significant nausea without any episodes of vomiting following cycle #1 of chemotherapy resulting in significant weight loss.  She is up 12 pounds over the last week as her appetite has normalized.  We will add Emend to her care plan.    She is having several loose stools per her colostomy and has been hesitant to take Imodium due to prior history of constipation.  I encouraged her to try this with a maximum of 8 tablets/day.  She denies any infectious symptoms including fevers or chills.  No excess bleeding or bruising noted.  She had the expected cold sensitivity related to the Oxaliplatin for about 3 days following treatment.    Labs from 02/12/2020 demonstrated total white blood cell count of 5.14, ANC of 3.06, hemoglobin of 11.2, platelets of  211,000.  She was found to be iron deficient last week and is intolerant to oral iron secondary to GI distress.  For this reason, she initiated IV iron therapy with Injectafer last week.  She had received her second dose 02/12/2020    Patient has normalized hemoglobin 12.2 on 2/26/2020.    She reported on 5/5/2020 she had a recent visit to the emergency department for what was suspected to be an allergic reaction.  She called our on-call service on Saturday with reports of hand and face swelling.  She was instructed to proceed to the emergency department at which time she was assessed and prescribed a Medrol Dosepak.  She had just completed her 5-FU and was unhooked on Friday, 5/3/2020.  Her symptoms have improved.  She does report persistent hyperpigmentation and mild swelling of the palms of the hands but this is much improved.  It was her right hand specifically that was swollen.  Her facial swelling has resolved.  She continues on Cefdinir nd since has 1 day remaining, she was prescribed cefdinir for a UTI requiring hospitalization at the end of April.  Reports no new symptoms.  Her labs are stable.  She is scheduled for treatment again.    Patient states at this time she is not tolerating her chemotherapy well.     Patient seen in the emergency department on 5/2/2020 for what was suspected to be an allergic reaction.  She called our on-call service on Saturday explaining that she was experiencing hand and face swelling.  She was instructed to proceed to the emergency department at which time she was assessed and prescribed a Medrol Dosepak.  She had just completed her 5-FU and was unhooked on Friday, 5/1/2020.      She reports since her ED visit on 5/2/2020 her symptoms have not improved. Her hands and feet were swollen upon presenting to the ED and she could barely make a fist. She states that she feels so swollen she is not able to stand it. She states that her skin on the hands and feet are peeling  extensively as well, besides changing color to more dark.     She also reports significant fatigue after her first week of the 5-FU treatment but she expected this side effect. She also notices significant increase in her neuropathy to the point that she is not able to even walk around in her home without her house slippers due to irritation from her rugs. She denies and associated nausea or vomiting at this time. She also has episodes of epistaxis every day after the chemotherapy cycle #7.  She does report working full-time during the week of chemotherapy.      Laboratory studies, 5/13/2020, show moderate thrombocytopenia platelets 123,000, low normal WBC 4140 including ANC 2720 and normal hemoglobin 13.4.  Because significant hand-foot syndrome, will decrease both 5-FU and oxaliplatin by 50%, and   eliminate bolus dose of 5-FU.    On 5/21/2020 patient had a PET scan performed which showed no convincing evidence of residual disease in abdomen or pelvis, no metastatic disease within the chest or neck.     Cycle #8 FOLFOX 6 was given on 5/13/2020, with 50% dose reduction for both 5-FU and oxaliplatin, and also examination of bolus 5-FU.  She states on 5/27/2020 that with the recent reduction of the chemotherapy she feels significantly better. She has more energy and is able to do her daily routine.      PET scan examination on 5/21/2020 reported no evidence of metastatic disease in chest abdomen pelvis.  There was a stable 6 mm right upper lobe pulmonary nodule.    Laboratory studies on 5/27/2020 showed mild leukocytopenia WBC 3720 but a normal ANC 2250 and lymphocytes 630.  Normal platelets 163,000 and hemoglobin 12.6.  Chemistry lab reported normal renal function, liver function panel and a electrolytes, elevated glucose 164.    Laboratory studies 6/24/2020, showed normal hemoglobin 13.4 but macrocytosis .9.  Normal platelets 210,000 and WBC 5870.  She had normal CMP.     Patient last time was here in late  June 2020.  Since that time she had surgical procedure for low anterior colon resection, coloanal anastomosis on 8/3/2020.  She later developed a perirectal abscess, required surgical drainage on 8/14/2020.  She was discharged on 8/27/2020.    Patient reports to me she still has lower abdominal wall vacuum suction in place.  She denies fever sweating chills.  Performance status is ECOG 1.  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  She does have visiting nurse come to the home for wound care.    CT scan for chest abdomen pelvis on 9/9/2020 reported a new 8 mm noncalcified pulmonary nodule in the left lower lobe.  Otherwise stable right upper lobe 6 mm pulmonary nodule, and no evidence of disease recurrence in the abdomen or pelvis.     Laboratory study on 9/16/2020 reported elevated AST 55, ALT 95, alk phosphatase 143 but normal total bilirubin 0.3.  Chemistry lab otherwise unremarkable.  She has completed normal CBC.      Because of the abnormal CT scan examination for chest abdomen pelvis on 9/9/2020 reported a new 8 mm noncalcified pulmonary nodule in the left lower lobe, we obtained a PET scan examination for further evaluation, which was done on 9/18/2020.  This study reported several pulmonary nodules, some of them are hypermetabolic, newly developed compared to previous PET scan in May 2020.  Certainly does highly suspicious for metastatic disease.  There are also hypermetabolic activity in the abdomen and pelvic area which is hard to differentiate from surgical scar versus metastatic malignancy.        MEDICATIONS    Current Outpatient Medications:   •  clonazePAM (KlonoPIN) 1 MG tablet, Take 1 tablet by mouth 3 (Three) Times a Day As Needed for Anxiety or Seizures., Disp: 90 tablet, Rfl: 0  •  dicyclomine (BENTYL) 20 MG tablet, TAKE 1 TABLET BY MOUTH EVERY 6 HOURS AS NEEDED, Disp: 120 tablet, Rfl: 2  •  escitalopram (LEXAPRO) 20 MG tablet, Take 1 tablet by mouth Daily., Disp: 90  tablet, Rfl: 3  •  famotidine (PEPCID) 20 MG tablet, TAKE 1 TABLET BY MOUTH TWICE A DAY (Patient taking differently: Take 20 mg by mouth Every Evening.), Disp: 180 tablet, Rfl: 1  •  HYDROcodone-acetaminophen (NORCO) 7.5-325 MG per tablet, Take 1 tablet by mouth Every 6 (Six) Hours As Needed for Moderate Pain ., Disp: 240 tablet, Rfl: 0  •  Loratadine (CLARITIN) 10 MG capsule, Take 10 mg by mouth Every Evening., Disp: , Rfl:   •  ondansetron (ZOFRAN) 8 MG tablet, Take 1 tablet by mouth 3 (Three) Times a Day As Needed for Nausea or Vomiting., Disp: 60 tablet, Rfl: 5  •  prochlorperazine (COMPAZINE) 10 MG tablet, Take 1 tablet by mouth Every 6 (Six) Hours As Needed for Nausea or Vomiting., Disp: 30 tablet, Rfl: 2  •  rivaroxaban (Xarelto) 20 MG tablet, Take 1 tablet by mouth Daily., Disp: 90 tablet, Rfl: 3    ALLERGIES:   No Known Allergies    SOCIAL HISTORY:       Social History     Tobacco Use   • Smoking status: Heavy Tobacco Smoker     Packs/day: 1.00     Years: 24.00     Pack years: 24.00     Types: Electronic Cigarette, Cigarettes   • Smokeless tobacco: Never Used   • Tobacco comment: LAST CIGARETTE 8/12/2020   Substance Use Topics   • Alcohol use: Not Currently   • Drug use: No         FAMILY HISTORY:   Mother has positive factor V Leiden mutation, although she did not have thrombosis, mother also is coronary disease with stenting, she also is occasional bruising.  Maternal grandmother had DVT, she had multiple surgical procedures.  Patient mother's half-brother had metastatic colon cancer at diagnosis in his 50s.  Maternal great aunt has breast cancer.  Patient will follow his skin cancer in his 60s, exclusive.    REVIEW OF SYSTEMS:  Review of Systems   Constitutional: Negative for activity change, appetite change, chills, diaphoresis, fatigue, fever and unexpected weight change.   HENT: Negative for congestion, hearing loss, mouth sores, nosebleeds and trouble swallowing.    Eyes: Negative for photophobia and  "visual disturbance.   Respiratory: Negative for cough, chest tightness and shortness of breath.    Cardiovascular: Negative for chest pain, palpitations and leg swelling.   Gastrointestinal: Negative for abdominal distention, abdominal pain, anal bleeding, constipation, diarrhea and nausea.   Endocrine: Negative for cold intolerance and heat intolerance.   Genitourinary: Negative for dysuria and hematuria.   Musculoskeletal: Negative for arthralgias, back pain and joint swelling.   Skin: Positive for wound (Low abdomen wound with vacuum suction in place). Negative for color change and rash.   Allergic/Immunologic: Positive for immunocompromised state (Secondary to adjuvant chemotherapy). Negative for environmental allergies and food allergies.   Neurological: Negative for dizziness, numbness and headaches.   Hematological: Negative for adenopathy. Does not bruise/bleed easily.   Psychiatric/Behavioral: Negative for agitation, behavioral problems, confusion and suicidal ideas.          Vitals:    10/13/20 0832   BP: 114/82   Pulse: 92   Resp: 16   Temp: 97.5 °F (36.4 °C)   SpO2: 96%   Weight: 95.4 kg (210 lb 4.8 oz)   Height: 166 cm (65.35\")   PainSc: 0-No pain     Current Status 10/13/2020   ECOG score 0     PHYSICAL EXAM:    CONSTITUTIONAL:  Well-developed, well-nourished female, no distress, looks comfortable.  EYES:  Conjunctiva and lids unremarkable.  Pupils equal and round.  EARS:   Ears appear unremarkable.    NOSE:  Patient wears mask due to the pandemic coronavirus infection.   MOUTH: Same as above.  THROAT: Same as above.  RESPIRATORY:  Normal respiratory effort.  Lungs clear to auscultation bilaterally.  CARDIOVASCULAR: Regular rhythm and rate.  Normal S1, S2.  No murmurs.   GASTROINTESTINAL: Abdomen no tender.  Ileostomy in the right lower abdomen.  Bowel sounds normal.  Nontender.     LYMPHATIC:  No cervical, supraclavicular lymphadenopathy.  MUSCULOSKELETAL:  Unremarkable gait and station.  No cyanosis " or clubbing.  No lower extremity edema.   SKIN:  Warm.  No rashes.  Lower abdomen wound has vacuum suction in place, it is smaller.  PSYCHIATRIC:  Normal judgment and insight.  Patient becomes very emotional, we discussed results of PET scan, indicating metastatic disease.    RECENT LABS:    Lab Results   Component Value Date    WBC 5.52 10/13/2020    HGB 13.9 10/13/2020    HCT 43.0 10/13/2020    MCV 91.1 10/13/2020     10/13/2020     Lab Results   Component Value Date    NEUTROABS 3.83 10/13/2020     Glucose   Date Value Ref Range Status   10/13/2020 107 74 - 124 mg/dL Final     BUN   Date Value Ref Range Status   10/13/2020 7 6 - 20 mg/dL Final     Creatinine   Date Value Ref Range Status   10/13/2020 0.72 0.60 - 1.10 mg/dL Final   09/09/2020 0.60 0.60 - 1.30 mg/dL Final     Comment:     Serial Number: [id number]:  [id number]     Sodium   Date Value Ref Range Status   10/13/2020 136 134 - 145 mmol/L Final     Potassium   Date Value Ref Range Status   10/13/2020 4.2 3.5 - 4.7 mmol/L Final     Chloride   Date Value Ref Range Status   10/13/2020 103 98 - 107 mmol/L Final     CO2   Date Value Ref Range Status   10/13/2020 23.5 22.0 - 29.0 mmol/L Final     Calcium   Date Value Ref Range Status   10/13/2020 9.2 8.5 - 10.2 mg/dL Final     Total Protein   Date Value Ref Range Status   10/13/2020 7.6 6.3 - 8.0 g/dL Final     Albumin   Date Value Ref Range Status   10/13/2020 3.70 3.50 - 5.20 g/dL Final     ALT (SGPT)   Date Value Ref Range Status   10/13/2020 35 (H) 0 - 33 U/L Final     AST (SGOT)   Date Value Ref Range Status   10/13/2020 20 0 - 32 U/L Final     Alkaline Phosphatase   Date Value Ref Range Status   10/13/2020 116 38 - 116 U/L Final     Total Bilirubin   Date Value Ref Range Status   10/13/2020 <0.2 (L) 0.2 - 1.2 mg/dL Final     eGFR Non  Amer   Date Value Ref Range Status   10/13/2020 89 >60 mL/min/1.73 Final     BUN/Creatinine Ratio   Date Value Ref Range Status   10/13/2020 9.7 7.3 - 30.0  Final     Anion Gap   Date Value Ref Range Status   10/13/2020 9.5 5.0 - 15.0 mmol/L Final       Assessment/Plan      ASSESSMENT:   1.  Rectal cancer, rectal ultrasound examination reported T3N0 disease without lymphadenopathy.   · CT scan of chest, abdomen and pelvis reported no lymphadenopathy in the abdomen, pelvis or chest. She does have small pulmonary nodule 6-7 mm and 2 mm with indeterminate feature. There is no solid evidence of metastatic disease otherwise.   · Based on the CT scan and rectal ultrasound imaging studies, this patient had stage IIA (T3N0M0) disease.    · She had PET scan examination on 8/8/2019 which reported a hypermetabolic right upper lobe pulmonary nodule 6 mm with SUV 5.6.  This is suspicious for metastatic disease however it is too small to be biopsied.  This patient may have stage IV disease.   · She initiated concurrent radiation with continuous 5-FU on 8/12/2019.  · Patient finished concurrent chemoradiation therapy.  · Patient underwent surgical resection of the rectal tumor and diverting loop ileostomy on 12/2/2019 with Dr. Ye.  Pathology evaluation reported residual T3 disease, with 1 out of 14 lymph nodes positive for malignancy.  Certainly this patient has at least stage IIIb rectal cancer (T3 N1 M0?)  · On 1/22/2020 adjuvant chemotherapy FOLFOX 6 regimen initiated.    · On 2/5/2022 cycle #2 was delayed secondary to neutropenia.  She was given 3 days of Granix.   · Emend added with cycle 3.  With improved nausea control  · Continuing home Granix x3 days following 5-FU disconnect  · 3/11/2020 due for cycle 4, however, she is experiencing progressive abdominal pain and occasional fevers.   · CT scan performed on 3/13/2020 reveals no evidence of progressive or recurrent disease.  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  · Patient hospitalized 4/24-4/26/20 after cycle 5 of chemotherapy with acute UTI.  CT abdomen/pelvis noting fluid collection in the presacral region having  diminished in size compared to CT dated 3/13/2020.  Patient was evaluated by Dr. Ye while in the hospital with further plans to evaluate possible colovaginal fistula following completion of chemotherapy.  Patient did respond to IV antibiotics and discharged home on oral cefdinir.  · 4/29/2020 cycle 6 of FOLFOX.  Urinary symptoms have resolved   · Patient seen in the [facility] ED on 5/2/2020 for what was suspected to be an allergic reaction.  She called our service on Saturday explaining that she was experiencing hand and face swelling.  She was instructed to proceed to the emergency department at which time she was assessed and prescribed a Medrol Dosepak.  She had just completed her 5-FU and was unhooked on Friday, 5/1/2020.  Her symptoms have resolved in the office on assessment, 5/5/2020.  · The patient had grade 3 hand-foot syndrome based on symptomology.  Patient had cycle #8 of 5-FU on 5/13/2020. Due to her symptoms and poor tolerance to the 5-FU, her treatment dose will be reduced 50% for oxaliplatin and infusional 5-FU.  Bolus 5-FU will be discontinued..  · On 5/13/2020  We discussed further treatment options pending the scan results.  If she has indeed residual disease or metastatic disease, we will switch her to irinotecan plus Avastin or anti-EGFR monoclonal antibody.  She will need a further molecular testing of her tumor samples in that case.  · On 5/21/2020 patient had a PET scan and it showed no evidence of of metastatic disease in the neck, chest, abdomen or pelvis.  There was fluid accumulation/abscess.   · On 5/27/2020 I discussed with the patient that we can discontinue chemotherapy at this time.  She will follow-up with Dr. Ye to discuss a possibility to reverse the ileostomy.  We likely will obtain anther PET scan in 3 to 4 months for reassessment.    · Patient was seen by Dr. Ye on 6/19/2019 for evaluation and to discuss possible take down of her ileostomy.  She is scheduled to have  a gastrografin enema on 7/2/2020 to evaluate for a fistula, and then a colonoscopy on 7/15/2020, both done by Dr. Ye.  She states that based on the above imaging and procedure findings, she may have a more extensive surgery done or just have her ileostomy reversed, which would likely be done in August 2020.  This will all be coordinated under the care of Dr. Ye.     · I reviewed scan results with the patient today on 9/16/2020 for the most recent CT scan from 09/09/2020 and also compared it to her previous PET scan examination from 05/21/2020 and also the original PET scan from 08/09/2019 and during the original PET scan there is a small right upper lobe pulmonary nodule 6 mm with SUV 5.6. So in the 05/2020 PET scan the nodule is still there but activity seems much less when I reviewed myself and this most recent CT scan examination also documented the preexisting small pulmonary nodule however there is a new left lower lobe pulmonary nodule 8 mm in size and I shared with the patient today this nodule is suspicious for metastatic disease. Laboratory studies reported minimal CEA level 1.32 on 09/09/2020. Liver function panel was only marginally abnormal with the ALT 45, the remaining is normal. However laboratory study today showed worsening AST 55, ALT 95 and alkaline phosphatase 143 but is still normal, total bilirubin 0.3.   · So I discussed with the patient on 9/16/2020 recommended to have repeat PET scan examination for assessment because the left lower lobe pulmonary nodule is too small to be biopsied, and if the PET scan reports hypermetabolic lesion, I recommended to have stereotactic radiation therapy. Explained to patient that she is not a really good candidate to have thoracotomy for resection because she still has unhealed wound in the abdomen. I think the stereotactic radiation therapy will be a more feasible choice.  · Laboratory study on 9/16/2020 reported worsening liver function panel with both  elevated AST, ALT and also slightly worsened alkaline phosphatase despite having normal total bilirubin. I recommended to repeat laboratory study for reevaluation. The only new medication she has in the past several days is Augmentin but otherwise no change of condition. She denies any recent viral infection. We will monitor this.   · PET scan examination obtained on 9/18/2020 showed metastatic disease.  It reported a few hypermetabolic pulmonary nodules, and some new small pulmonary nodules, or highly suspicious for metastatic disease.  She also has hypermetabolic activity in the abdomen and pelvic area which could be related to scar tissue from her recent surgery versus metastatic disease.  Nevertheless overall picture fits with metastatic cancer.  · I discussed with the patient on 9/20/2020, we reviewed the PET scan images together, and I recommended to have systematic chemotherapy, I do not think stereotactic reading therapy to the hypermetabolic lesions in the lungs warranted at this time because even those will be treated with reading therapy, she will still need systematic chemotherapy.  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  I did discuss with the patient using anti-EGFR monoclonal antibody versus anti-VEGF monoclonal antibody.  We already requested pathology sample to be tested for mutations K-s[place]ia, BRAF and and BRAF, and the MSI expression, to see if patient would qualify for anti-EGFR monoclonal antibodies.  In my opinion, anti-VEGF antibody is not a great choice for this patient at least for now because she is to his open wound requires vacuum suction.  I discussed with the patient for the side effects associated with those 2 different kinds of monoclonal antibodies.  At this point we are not sure whether she is a candidate for immunotherapy with anti-checkpoint inhibitor.    · Second #1 FOLFIRI will be started on 10/13/2020.  Genetic studies still pending.      2 .  Pulmonary nodule, hypermetabolic on previous PET scan.   · Her PET scan examination from 8/8/2019 reported a small 8 mm right upper apex pulmonary nodule but hypermetabolic SUV 5.1.  That was too small to be biopsied, however there was always a possibility of metastatic disease considering that high activity despite a such a small nodule.  · Her chest CT scan examination from 3/13/2020 reported this shrank to 6 mm.    · PET scan examination on 5/21/2020 reported no metabolic activity at this residual nodule.  This needs to be monitored.    · PET scan examination 9/18/2020 reported couple of hypermetabolic pulmonary nodules, besides a few extra small pulmonary nodules.  Those are highly suspicious for metastatic disease.      3.   Pulmonary emboli with background of positive factor V Leiden mutation   · The patient has heterozygous factor V Leiden mutation and therefore was on low-dose anticoagulation with Lovenox, 40 mg daily given her increased risk of thrombosis with MediPort and GI malignancy.    · Nevertheless this patient was found to having pulmonary emboli on 9/20/2019 despite low-dose Lovenox.  She had a brief hospitalization in late September 2019, she was started on full dose Xarelto anticoagulation per protocol.    · The patient continues on Xarelto and is tolerating this well.  She is having some issues with slight nosebleeds as outlined above.   · No issues reported on 10/13/2020.  Patient will continue Xarelto for now.      4.  This patient also has strong family history for malignancy the patient had appointment with genetic counseling on September 3, 2019.  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    5.  Mild anemia, improved since her surgery.    · She also has a history of iron deficiency.  Iron studies reveal a saturation of just 10%.  She was started on oral iron but was unable to tolerate due to GI side effects.   · Status post Injectafer 2/5/2020 and 2/12/2020   · Hemoglobin was  within normal limits on 4/1/2020 at 13.1  · Hemoglobin 13.4 on 5/2/2020  · Hemoglobin is 13.4 on 5/13/2020  · Hemoglobin stable at 12.6 on 5/27/2020.  · Hemoglobin stable at 13.4 on 6/24/2020.  · Hemoglobin 10.7 on 8/12/2020, postop.    · Patient has improved and normalized hemoglobin 15.4 on 9/16/2020.   · Hb 13.9 on 10/13/2020.  We started first cycle of FOLFIRI.  The chemotherapy.    6.  Peripheral neuropathy secondary to chemotherapy.    · This is mild involving bilateral hands and feet as reported on 9/16/2020.   · Will avoid chemotherapy with oxaliplatin.    7.  Hand-foot syndrome grade 3.    · It become so significant after cycle #7 FOLFOX treatment.  Discussed with patient on 5/13/2020, will discontinue the bolus 5-FU dose, and also decrease 50% of the infusion dose through the pump.   · We also use Medrol Dosepak for possible recurrent symptomology.  She responded this well.   · Her hand-foot syndrome improved.  · For the FOLFIRI regimen, will not give bolus 5-FU, but will give full dose of 5-FU as calculated.    8.  Hyperlacrimation and mild scleral irritation related to 5-FU.  She has been taking steroid eyedrops.  This has improved her hyperlacrimation.  · Not a current issue.  However expecting this will recur once she has been started back on 5-FU treatment.    9.  Abnormal liver function panel.  · Improve the liver function panel 9/18/2020.  This is probably related to her recent infection.  · He has normalized AST, alk phosphatase, and a much improved only marginally elevated ALT 35 on 10/13/2020.  Continue to monitor.      PLAN:  1. Proceed ahead with cycle #1 FOLFIRI treatment today.  To be aware, there would be no bolus dose of 5-FU due to previous hand-foot syndrome.    2. Pending results for comprehensive NGS by [facility] laboratory.   3. Continue Xarelto 20 mg daily as prescribed.   4. We will prescribe Lomotil to pharmacy.  Patient already has ileostomy, likely she will have issue with  large quantity of liquid stool secondary to chemotherapy.    5. Continue wound care.   6. Patient will return in 2 weeks to see me for reevaluation, with lab studies prior to cycle 2 chemotherapy.  7. I have asked the patient to call the office should she experience new or worsening symptoms.     This patient is on high risk medication and needs close monitoring.       ALEXANDRU HUNT M.D., Ph.D.    10/13/2020.      CC:  Deepika Vela III NP-C   MD Perla Bowers MD

## 2020-10-13 NOTE — NURSING NOTE
Pt signed consent for irinotecan and given printed chemo info on this drug as well.  Pt also reminded of indications for atropine during treatment of irinotecan.  Pt completed treatment today and reported before getting up to go to restroom (approx 20 minutes earlier) that she had cloudy vision, but it cleared within 1 minute.  She states that while asleep she did feel her stomach upset, but that went away very quickly.  Pt asked to report any further issues, but pt has no further complaints.  Pt encouraged to call with any questions or concerns.Pt v/u.  Pt requests appt for disconnect of 5FU ball to be before work at 8am.  Request made for pt.

## 2020-10-15 ENCOUNTER — INFUSION (OUTPATIENT)
Dept: ONCOLOGY | Facility: HOSPITAL | Age: 40
End: 2020-10-15

## 2020-10-15 DIAGNOSIS — Z45.2 ENCOUNTER FOR FITTING AND ADJUSTMENT OF VASCULAR CATHETER: ICD-10-CM

## 2020-10-15 DIAGNOSIS — C20 ADENOCARCINOMA OF RECTUM (HCC): Primary | ICD-10-CM

## 2020-10-15 PROCEDURE — 25010000003 HEPARIN LOCK FLUSH PER 10 UNITS: Performed by: INTERNAL MEDICINE

## 2020-10-15 RX ORDER — HEPARIN SODIUM (PORCINE) LOCK FLUSH IV SOLN 100 UNIT/ML 100 UNIT/ML
500 SOLUTION INTRAVENOUS AS NEEDED
Status: CANCELLED | OUTPATIENT
Start: 2020-10-15

## 2020-10-15 RX ORDER — DIPHENOXYLATE HYDROCHLORIDE AND ATROPINE SULFATE 2.5; .025 MG/1; MG/1
1 TABLET ORAL 4 TIMES DAILY PRN
Qty: 120 TABLET | Refills: 1 | Status: SHIPPED | OUTPATIENT
Start: 2020-10-15 | End: 2021-01-08

## 2020-10-15 RX ORDER — HEPARIN SODIUM (PORCINE) LOCK FLUSH IV SOLN 100 UNIT/ML 100 UNIT/ML
500 SOLUTION INTRAVENOUS AS NEEDED
Status: DISCONTINUED | OUTPATIENT
Start: 2020-10-15 | End: 2020-10-15 | Stop reason: HOSPADM

## 2020-10-15 RX ORDER — SODIUM CHLORIDE 0.9 % (FLUSH) 0.9 %
10 SYRINGE (ML) INJECTION AS NEEDED
Status: CANCELLED | OUTPATIENT
Start: 2020-10-15

## 2020-10-15 RX ORDER — SODIUM CHLORIDE 0.9 % (FLUSH) 0.9 %
10 SYRINGE (ML) INJECTION AS NEEDED
Status: DISCONTINUED | OUTPATIENT
Start: 2020-10-15 | End: 2020-10-15 | Stop reason: HOSPADM

## 2020-10-15 RX ADMIN — SODIUM CHLORIDE, PRESERVATIVE FREE 10 ML: 5 INJECTION INTRAVENOUS at 07:59

## 2020-10-15 RX ADMIN — SODIUM CHLORIDE, PRESERVATIVE FREE 500 UNITS: 5 INJECTION INTRAVENOUS at 08:03

## 2020-10-22 ENCOUNTER — TELEPHONE (OUTPATIENT)
Dept: INTERNAL MEDICINE | Facility: CLINIC | Age: 40
End: 2020-10-22

## 2020-10-22 NOTE — TELEPHONE ENCOUNTER
Patient called and is developing a UTI. She is going to the bathroom every 10 mins and its burning. She wants to see if she can get an antibiotic before it gets too bad and has to go to the ER. Would like a call back to advise due to being on blood thiners so can only have certain ones. The blood thiner is xarelto. Please call back and advise at [phone]. Is okay with coming in for urinalysis as well if needed.    Verified pharmacy-[id number] - [place], [place] - [address] AT [facility] - [phone]  - [phone]   [phone]

## 2020-10-22 NOTE — TELEPHONE ENCOUNTER
PATIENT CALLED BACK IS HAVING BURNING SENSATIONS WHEN URINATING AND IS GOING VERY FREQUENTLY.    PLEASE ADVISE SHE WOULD REALLY LIKE SOMETHING TODAY.    [phone]

## 2020-10-22 NOTE — TELEPHONE ENCOUNTER
Just sending to see what you would like to do since it is your half day and you will be out tomorrow.

## 2020-10-22 NOTE — TELEPHONE ENCOUNTER
I called and spoke with patient.  She already went to urgent care and has abx.  Will call back if not improved.

## 2020-10-27 ENCOUNTER — INFUSION (OUTPATIENT)
Dept: ONCOLOGY | Facility: HOSPITAL | Age: 40
End: 2020-10-27

## 2020-10-27 ENCOUNTER — TELEPHONE (OUTPATIENT)
Dept: ONCOLOGY | Facility: CLINIC | Age: 40
End: 2020-10-27

## 2020-10-27 ENCOUNTER — OFFICE VISIT (OUTPATIENT)
Dept: ONCOLOGY | Facility: CLINIC | Age: 40
End: 2020-10-27

## 2020-10-27 VITALS
DIASTOLIC BLOOD PRESSURE: 85 MMHG | WEIGHT: 212.9 LBS | TEMPERATURE: 97.3 F | BODY MASS INDEX: 35.47 KG/M2 | OXYGEN SATURATION: 96 % | RESPIRATION RATE: 14 BRPM | SYSTOLIC BLOOD PRESSURE: 122 MMHG | HEART RATE: 97 BPM | HEIGHT: 65 IN

## 2020-10-27 DIAGNOSIS — C20 ADENOCARCINOMA OF RECTUM (HCC): ICD-10-CM

## 2020-10-27 DIAGNOSIS — N30.00 ACUTE CYSTITIS WITHOUT HEMATURIA: ICD-10-CM

## 2020-10-27 DIAGNOSIS — C20 ADENOCARCINOMA OF RECTUM (HCC): Primary | ICD-10-CM

## 2020-10-27 LAB
ALBUMIN SERPL-MCNC: 3.6 G/DL (ref 3.5–5.2)
ALBUMIN/GLOB SERPL: 1 G/DL (ref 1.1–2.4)
ALP SERPL-CCNC: 118 U/L (ref 38–116)
ALT SERPL W P-5'-P-CCNC: 24 U/L (ref 0–33)
ANION GAP SERPL CALCULATED.3IONS-SCNC: 10.9 MMOL/L (ref 5–15)
AST SERPL-CCNC: 17 U/L (ref 0–32)
BASOPHILS # BLD AUTO: 0.02 10*3/MM3 (ref 0–0.2)
BASOPHILS NFR BLD AUTO: 0.5 % (ref 0–1.5)
BILIRUB SERPL-MCNC: <0.2 MG/DL (ref 0.2–1.2)
BUN SERPL-MCNC: 11 MG/DL (ref 6–20)
BUN/CREAT SERPL: 13.3 (ref 7.3–30)
CALCIUM SPEC-SCNC: 9 MG/DL (ref 8.5–10.2)
CHLORIDE SERPL-SCNC: 103 MMOL/L (ref 98–107)
CO2 SERPL-SCNC: 21.1 MMOL/L (ref 22–29)
CREAT SERPL-MCNC: 0.83 MG/DL (ref 0.6–1.1)
DEPRECATED RDW RBC AUTO: 47.1 FL (ref 37–54)
EOSINOPHIL # BLD AUTO: 0.17 10*3/MM3 (ref 0–0.4)
EOSINOPHIL NFR BLD AUTO: 4.2 % (ref 0.3–6.2)
ERYTHROCYTE [DISTWIDTH] IN BLOOD BY AUTOMATED COUNT: 14 % (ref 12.3–15.4)
GFR SERPL CREATININE-BSD FRML MDRD: 76 ML/MIN/1.73
GLOBULIN UR ELPH-MCNC: 3.5 GM/DL (ref 1.8–3.5)
GLUCOSE SERPL-MCNC: 96 MG/DL (ref 74–124)
HCT VFR BLD AUTO: 42.1 % (ref 34–46.6)
HGB BLD-MCNC: 13.4 G/DL (ref 12–15.9)
IMM GRANULOCYTES # BLD AUTO: 0.01 10*3/MM3 (ref 0–0.05)
IMM GRANULOCYTES NFR BLD AUTO: 0.2 % (ref 0–0.5)
LYMPHOCYTES # BLD AUTO: 0.89 10*3/MM3 (ref 0.7–3.1)
LYMPHOCYTES NFR BLD AUTO: 21.9 % (ref 19.6–45.3)
MCH RBC QN AUTO: 29.5 PG (ref 26.6–33)
MCHC RBC AUTO-ENTMCNC: 31.8 G/DL (ref 31.5–35.7)
MCV RBC AUTO: 92.7 FL (ref 79–97)
MONOCYTES # BLD AUTO: 0.4 10*3/MM3 (ref 0.1–0.9)
MONOCYTES NFR BLD AUTO: 9.9 % (ref 5–12)
NEUTROPHILS NFR BLD AUTO: 2.57 10*3/MM3 (ref 1.7–7)
NEUTROPHILS NFR BLD AUTO: 63.3 % (ref 42.7–76)
NRBC BLD AUTO-RTO: 0 /100 WBC (ref 0–0.2)
PLATELET # BLD AUTO: 275 10*3/MM3 (ref 140–450)
PMV BLD AUTO: 8.2 FL (ref 6–12)
POTASSIUM SERPL-SCNC: 4.6 MMOL/L (ref 3.5–4.7)
PROT SERPL-MCNC: 7.1 G/DL (ref 6.3–8)
RBC # BLD AUTO: 4.54 10*6/MM3 (ref 3.77–5.28)
SODIUM SERPL-SCNC: 135 MMOL/L (ref 134–145)
WBC # BLD AUTO: 4.06 10*3/MM3 (ref 3.4–10.8)

## 2020-10-27 PROCEDURE — 25010000002 PALONOSETRON PER 25 MCG: Performed by: INTERNAL MEDICINE

## 2020-10-27 PROCEDURE — 80053 COMPREHEN METABOLIC PANEL: CPT

## 2020-10-27 PROCEDURE — 25010000002 ATROPINE PER 0.01 MG: Performed by: INTERNAL MEDICINE

## 2020-10-27 PROCEDURE — 25010000002 FLUOROURACIL PER 500 MG: Performed by: INTERNAL MEDICINE

## 2020-10-27 PROCEDURE — 96415 CHEMO IV INFUSION ADDL HR: CPT

## 2020-10-27 PROCEDURE — 96375 TX/PRO/DX INJ NEW DRUG ADDON: CPT

## 2020-10-27 PROCEDURE — 96367 TX/PROPH/DG ADDL SEQ IV INF: CPT

## 2020-10-27 PROCEDURE — 25010000002 LEUCOVORIN CALCIUM PER 50 MG: Performed by: INTERNAL MEDICINE

## 2020-10-27 PROCEDURE — 96416 CHEMO PROLONG INFUSE W/PUMP: CPT

## 2020-10-27 PROCEDURE — 99215 OFFICE O/P EST HI 40 MIN: CPT | Performed by: INTERNAL MEDICINE

## 2020-10-27 PROCEDURE — 25010000002 DEXAMETHASONE SODIUM PHOSPHATE 100 MG/10ML SOLUTION: Performed by: INTERNAL MEDICINE

## 2020-10-27 PROCEDURE — 96368 THER/DIAG CONCURRENT INF: CPT

## 2020-10-27 PROCEDURE — 25010000002 IRINOTECAN PER 20 MG: Performed by: INTERNAL MEDICINE

## 2020-10-27 PROCEDURE — 96413 CHEMO IV INFUSION 1 HR: CPT

## 2020-10-27 PROCEDURE — 25010000002 FOSAPREPITANT PER 1 MG: Performed by: INTERNAL MEDICINE

## 2020-10-27 PROCEDURE — 85025 COMPLETE CBC W/AUTO DIFF WBC: CPT

## 2020-10-27 RX ORDER — ATROPINE SULFATE 1 MG/ML
0.25 INJECTION, SOLUTION INTRAMUSCULAR; INTRAVENOUS; SUBCUTANEOUS
Status: CANCELLED | OUTPATIENT
Start: 2020-10-27

## 2020-10-27 RX ORDER — ATROPINE SULFATE 0.4 MG/ML
0.25 AMPUL (ML) INJECTION
Status: DISCONTINUED | OUTPATIENT
Start: 2020-10-27 | End: 2020-10-27 | Stop reason: HOSPADM

## 2020-10-27 RX ORDER — SODIUM CHLORIDE 9 MG/ML
250 INJECTION, SOLUTION INTRAVENOUS ONCE
Status: COMPLETED | OUTPATIENT
Start: 2020-10-27 | End: 2020-10-27

## 2020-10-27 RX ORDER — SODIUM CHLORIDE 9 MG/ML
250 INJECTION, SOLUTION INTRAVENOUS ONCE
Status: CANCELLED | OUTPATIENT
Start: 2020-10-27

## 2020-10-27 RX ORDER — PALONOSETRON 0.05 MG/ML
0.25 INJECTION, SOLUTION INTRAVENOUS ONCE
Status: COMPLETED | OUTPATIENT
Start: 2020-10-27 | End: 2020-10-27

## 2020-10-27 RX ORDER — PALONOSETRON 0.05 MG/ML
0.25 INJECTION, SOLUTION INTRAVENOUS ONCE
Status: CANCELLED | OUTPATIENT
Start: 2020-10-27

## 2020-10-27 RX ADMIN — ATROPINE SULFATE 0.25 MG: 0.4 INJECTION, SOLUTION INTRAMUSCULAR; INTRAVENOUS; SUBCUTANEOUS at 09:41

## 2020-10-27 RX ADMIN — SODIUM CHLORIDE 100 ML: 9 INJECTION, SOLUTION INTRAVENOUS at 08:41

## 2020-10-27 RX ADMIN — LEUCOVORIN CALCIUM 810 MG: 350 INJECTION, POWDER, LYOPHILIZED, FOR SOLUTION INTRAMUSCULAR; INTRAVENOUS at 09:44

## 2020-10-27 RX ADMIN — PALONOSETRON 0.25 MG: 0.05 INJECTION, SOLUTION INTRAVENOUS at 08:41

## 2020-10-27 RX ADMIN — FLUOROURACIL 4850 MG: 50 INJECTION, SOLUTION INTRAVENOUS at 11:41

## 2020-10-27 RX ADMIN — DEXTROSE MONOHYDRATE 365 MG: 50 INJECTION, SOLUTION INTRAVENOUS at 09:44

## 2020-10-27 RX ADMIN — SODIUM CHLORIDE 250 ML: 9 INJECTION, SOLUTION INTRAVENOUS at 08:41

## 2020-10-27 RX ADMIN — DEXAMETHASONE SODIUM PHOSPHATE 12 MG: 10 INJECTION, SOLUTION INTRAMUSCULAR; INTRAVENOUS at 09:14

## 2020-10-27 NOTE — TELEPHONE ENCOUNTER
----- Message from Dawn Lau RN sent at 10/27/2020 11:21 AM EDT -----  Needs unhook at 930 on thursday

## 2020-10-29 ENCOUNTER — INFUSION (OUTPATIENT)
Dept: ONCOLOGY | Facility: HOSPITAL | Age: 40
End: 2020-10-29

## 2020-10-29 DIAGNOSIS — Z45.2 ENCOUNTER FOR FITTING AND ADJUSTMENT OF VASCULAR CATHETER: ICD-10-CM

## 2020-10-29 DIAGNOSIS — C20 ADENOCARCINOMA OF RECTUM (HCC): Primary | ICD-10-CM

## 2020-10-29 PROCEDURE — 25010000003 HEPARIN LOCK FLUSH PER 10 UNITS: Performed by: INTERNAL MEDICINE

## 2020-10-29 RX ORDER — HEPARIN SODIUM (PORCINE) LOCK FLUSH IV SOLN 100 UNIT/ML 100 UNIT/ML
500 SOLUTION INTRAVENOUS AS NEEDED
Status: CANCELLED | OUTPATIENT
Start: 2020-10-29

## 2020-10-29 RX ORDER — SODIUM CHLORIDE 0.9 % (FLUSH) 0.9 %
10 SYRINGE (ML) INJECTION AS NEEDED
Status: DISCONTINUED | OUTPATIENT
Start: 2020-10-29 | End: 2020-10-29 | Stop reason: HOSPADM

## 2020-10-29 RX ORDER — SODIUM CHLORIDE 0.9 % (FLUSH) 0.9 %
10 SYRINGE (ML) INJECTION AS NEEDED
Status: CANCELLED | OUTPATIENT
Start: 2020-10-29

## 2020-10-29 RX ORDER — HEPARIN SODIUM (PORCINE) LOCK FLUSH IV SOLN 100 UNIT/ML 100 UNIT/ML
500 SOLUTION INTRAVENOUS AS NEEDED
Status: DISCONTINUED | OUTPATIENT
Start: 2020-10-29 | End: 2020-10-29 | Stop reason: HOSPADM

## 2020-10-29 RX ADMIN — SODIUM CHLORIDE, PRESERVATIVE FREE 10 ML: 5 INJECTION INTRAVENOUS at 09:29

## 2020-10-29 RX ADMIN — Medication 500 UNITS: at 09:29

## 2020-11-02 ENCOUNTER — OFFICE VISIT (OUTPATIENT)
Dept: SURGERY | Facility: CLINIC | Age: 40
End: 2020-11-02

## 2020-11-02 VITALS
DIASTOLIC BLOOD PRESSURE: 84 MMHG | OXYGEN SATURATION: 100 % | BODY MASS INDEX: 34.17 KG/M2 | WEIGHT: 212.6 LBS | SYSTOLIC BLOOD PRESSURE: 128 MMHG | HEART RATE: 88 BPM | HEIGHT: 66 IN | TEMPERATURE: 97.6 F

## 2020-11-02 DIAGNOSIS — K65.1 ABDOMINOPELVIC ABSCESS (HCC): ICD-10-CM

## 2020-11-02 DIAGNOSIS — C20 RECTAL CANCER (HCC): Primary | ICD-10-CM

## 2020-11-02 PROCEDURE — 99024 POSTOP FOLLOW-UP VISIT: CPT | Performed by: COLON & RECTAL SURGERY

## 2020-11-02 NOTE — PROGRESS NOTES
"Carole Weaver is a 40 y.o. female in for follow up of Rectal cancer (CMS/HCC)    Abdominopelvic abscess (CMS/HCC)    Pt having hard time with met dx. She understands but wants realistic answers on survival and how long she has to live.  She is questioning whether chemo will work.  She wants to continue chemo but is going thru the what ifs.. it doesn't work, what next, if it works what next...  She wants to stay positive but it is wearing on her.    Wound is getting better    /84 (BP Location: Right arm, Patient Position: Sitting, Cuff Size: Small Adult)   Pulse 88   Temp 97.6 °F (36.4 °C)   Ht 167.6 cm (66\")   Wt 96.4 kg (212 lb 9.6 oz)   SpO2 100%   Breastfeeding No   BMI 34.31 kg/m²   Body mass index is 34.31 kg/m².      PE:  Physical Exam  Exam conducted with a chaperone present.   Constitutional:       General: She is not in acute distress.     Appearance: She is well-developed.   HENT:      Head: Normocephalic and atraumatic.      Nose: Nose normal.   Eyes:      Conjunctiva/sclera: Conjunctivae normal.      Pupils: Pupils are equal, round, and reactive to light.   Neck:      Musculoskeletal: Normal range of motion.      Trachea: No tracheal deviation.   Pulmonary:      Effort: Pulmonary effort is normal. No respiratory distress.      Breath sounds: Normal breath sounds.   Abdominal:      General: Bowel sounds are normal. There is no distension.      Palpations: Abdomen is soft.      Comments: Wound vac changed. Inferior midline abd incision approx 1 cm deep, pink, granulation tissue.   Musculoskeletal: Normal range of motion.         General: No deformity.   Skin:     General: Skin is warm and dry.   Neurological:      Mental Status: She is alert and oriented to person, place, and time.      Cranial Nerves: No cranial nerve deficit.      Coordination: Coordination normal.      Gait: Gait normal.   Psychiatric:         Behavior: Behavior normal.         Judgment: Judgment normal. "           Assessment:   1. Rectal cancer (CMS/HCC)    2. Abdominopelvic abscess (CMS/HCC)         Plan: wound vac replaced.    Referral to psychology/ psychiatry with cancer center  RTC 1 month.

## 2020-11-02 NOTE — PROGRESS NOTES
REASON FOR FOLLOW UP:     1. Rectal cancer, rectal ultrasound examination in July 2019 reported T3N0 disease without lymphadenopathy. She does have small pulmonary nodule 6-7 mm and 2 mm with indeterminate feature. There is no solid evidence of metastatic disease otherwise. Patient has stage IIA (T3N0M0) disease.  2. The patient is heterozygous factor V Leiden, was on prophylactic anticoagulation with Lovenox 40 mg daily given her increased risk of thrombosis with MediPort and GI malignancy.   3. PET scan on 8/8/2019 reported an 8 mm hypermetabolic right upper lobe pulmonary nodule, which is suspicious for metastatic as well.    4. Patient had a PowerPort placement on the left upper chest by Dr. Joseph on 8/9/2019.  5. Patient was started on concurrent infusional 5-FU chemoradiation therapy on 8/12/2019, with planned complete surgical resection and further adjuvant chemotherapy with intention to cure the disease.   6. Patient underwent surgical resection of the primary rectal cancer by Dr. Ye on 12/2/2019.  Pathology evaluation reported residual T3N1 disease stage IIIb.  7. Diarrhea related to therapy and radiation.   8. Patient was started cycle 1 day 1 of adjuvant FOLFOX 6 on 1/23/2020.  9. On 2/5/2020 FOLFOX 6 cycle 2 delayed secondary to neutropenia.  Patient was given 3 days of Granix injection.  After cycle #2 we planned 3 days of Granix with each cycle.   10. Patient also intolerant of oral iron.  Patient received 2 doses of IV injectafer on 02/05/2020 and 02/12/2020.   11. 02/12/2020 Proceed with cycle #2 of FOLFOX 6 with 3 days of Granix.  12. On 3/11/2020 cycle 4 postponed secondary to abdominal pain and occasional low-grade fevers.  CT scans ordered.  13. Cycle #4 resumed after CT scan revealed no evidence of disease.  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 Ye. patient has no fever.  Continue to observe.   14. Grade 3  hand-foot syndrome secondary to 5-FU after cycle #7 FOLFOX 6 chemotherapy, prompting ER visit.  Also has worsening peripheral neuropathy.   15. Cycle #8 FOLFOX 6 was given on 5/13/2020, with 50% dose reduction for both 5-FU and oxaliplatin, and also examination of bolus 5-FU.   16. PET scan examination on 5/21/2020 reported no evidence of metastatic disease in the chest abdomen pelvis.  Stable 6 mm RUL pulmonary nodule.  17. On 5/27/2020, I discussed with the patient that we can discontinue chemotherapy at this time.   18. Patient had a surgical procedure for low anterior colon resection, coloanal anastomosis on 8/3/2020.  19. CT scan for chest abdomen pelvis on 9/9/2020 reported a new 8 mm noncalcified pulmonary nodule in the left lower lobe of the lung.  Otherwise stable right upper lobe 6 mm pulmonary nodule, and no evidence of disease recurrence in the abdomen or pelvis.  20. PET scan examination on 9/18/2020 reported multiple hypermetabolic small pulmonary nodules/ new pulmonary nodules.   21. Patient was started on pelvic chemotherapy with FOLFIRI regimen on 10/13/2020.   22. 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 It reported tumor mutation burden 5 Muts/Mb, microsatellite stable, TP53 mutation R282W, and others.    HISTORY OF PRESENT ILLNESS:  The patient is a 40 y.o. female with the above medical history who presents today for evaluation prior to cycle #2 palliative chemotherapy with FOLFIRI regimen.    Patient reports she tolerated cycle 1 chemotherapy 2 weeks ago 10/13/2020.  She reports no nausea no vomiting.  She has a ileostomy bag in place, reports no worsening or significant increase in quantity of liquid stool.  Denies fever sweating or chills.    She continues to have a wound vacuum suction, however it is smaller.  Patient reports the hole is about 1 cm deep.    Patient reports she had recent urinary tract infection on 10/22/2020, and was  started on Bactrim.  She reports resolution of symptoms.  Denies fever sweating or chills.    Patient remains performance status ECOG 1.  She continues to work part-time in office, and part-time at home.  She does have visiting nurse come to the home for wound care.    Lab study today on 10/27/2020 reported normal CBC.   CMP was also unremarkable.     We will proceed ahead with cycle #2 palliative chemotherapy FOLFIRI today.     Past Medical History:   Diagnosis Date   • Abnormal Pap smear of cervix 02/02/1998    JULIUS I   • Anemia in pregnancy    • Anxiety    • Bilateral epiphora 04/2020   • Bilateral hand swelling 05/02/2020    SEEN AT [facility] ER   • Cervical lymphadenitis 06/06/2012    SEEN AT [facility] ER   • Chemotherapy induced neutropenia (CMS/HCC) 04/2020   • Chemotherapy-induced nausea 02/2020   • Chemotherapy-induced thrombocytopenia 05/2020   • Chronic diarrhea    • Colon polyps     FOLLOWED BY DR. GERONIMO ESPARZA   • Cystitis 04/24/2020    WITH DEHYDRATION, ADMITTED TO [facility]   • Drug-induced peripheral neuropathy (CMS/HCC) 05/2020   • Fistula of intestine    • GERD (gastroesophageal reflux disease)    • Hand foot syndrome 05/2020   • Heart murmur     IN CHILDHOOD   • Hematochezia    • Hemorrhoids    • Heterozygous factor V Leiden mutation (CMS/HCC)     DX 8-2-2019   • History of anemia    • History of chemotherapy 2019    FOLLOWED BY DR. ALEXANDRU HUNT   • History of gestational diabetes    • History of pre-eclampsia    • History of radiation therapy 2019    FOLLOWED BY DR. JAVON LEWIS   • History of TB skin testing 01/17/2009    TB Skin Test   • HPV in female 1998   • Hypokalemia 09/2019   • Hypomagnesemia 09/2019   • IBS (irritable bowel syndrome)    • Ileostomy in place (CMS/HCC)     FOLLOWED BY DR. GERONIMO ESPARZA   • Infected insect bite of neck 05/27/2016    SEEN AT [facility]   • Kidney stones 08/09/2007    SEEN AT [facility] ER   • Lump of right breast     SWOLLEN LYMPH NODE   • Monopolar depression (CMS/HCC)    • On  anticoagulant therapy    • PIH (pregnancy induced hypertension)    • Pulmonary embolism without acute cor pulmonale (CMS/[facility]) 09/20/2019    X 3; ADMITTED TO [facility]   • Pulmonary nodule, right 2020   • Rectal cancer (CMS/[facility]) 2019    STAGE IIA, INVASIVE MODERATELY DIFFERENTIATED ADENOCARCINOMA, CHEMO AND XRT FINISHED 2019   • Right ureteral stone 10/01/2019    SEEN AT [facility] ER   • SAB (spontaneous ) 1996     A2-1 INDUCED   • Sciatica    • Sepsis due to cellulitis (CMS/[facility]) 2002    LEFT GREAT TOE, ADMITTED TO [facility]   • Tachycardia 2020   • Urinary urgency 2020     Past Surgical History:   Procedure Laterality Date   • CHOLECYSTECTOMY N/A 10/10/2003    LAPAROSCOPIC WITH CHOLANGIOGRAM, DR. JAMEY TALAVERA AT [facility]   • COLON RESECTION N/A 2019    Procedure: laparoscopic low anterior colon resection with mobilization of splenic flexure and diverting loop ileostomy: ERAS;  Surgeon: Padmaja Esparza MD;  Location: [facility] OR;  Service: General   • COLON RESECTION N/A 8/3/2020    Procedure: LOW ANTERIOR COLON RESECTION, COLOANAL ANASTOMOSIS, MOBILIZATION SPLENIC FLEXURE;  Surgeon: Padmaja Esparza MD;  Location: [facility] MAIN OR;  Service: General;  Laterality: N/A;   • COLONOSCOPY N/A 7/15/2020    PATENT ANASTAMOSIS IN MID RECTUM, RESCOPE IN 1 YR, DR. PADMAJA ESPARZA AT [facility]   • COLONOSCOPY W/ POLYPECTOMY N/A 2019    15 MM TUBULOVILLOUS ADENOMA POLYP IN HEPATIC FLEXURE, 20 MMTUBULOVILLOUS ADENOMA WITH HIGH GRADE DYSPLASIA IN RECTOSIGMOID, 4 CM MASS IN MID RECTUM, PATH: INVASIVE MODERATELY DIFFERENTIATED ADENOCARCINOMA, DR. JENNIFER LI AT [facility]   • DILATATION AND EVACUATION N/A    • ENDOSCOPY N/A 2019    LA GRADE A ESOPHAGITIS, GASTRITIS, ALL BIOPSIES BENIGN, DR. JENNIFER LI AT [facility]   • INCISION AND DRAINAGE PERIRECTAL ABSCESS N/A 2020    Procedure: INCISION AND DRAINAGE OF retrorectal dehiscence abcess with drain placement and  irrigation;  Surgeon: Geronimo Ye MD;  Location: [facility] MAIN OR;  Service: General;  Laterality: N/A;   • PAP SMEAR N/A 01/24/2014   • SIGMOIDOSCOPY N/A 7/24/2019    INFILTRATIVE PARTIALLY OBSTRUCTING LARGE RECTAL CANCER, AREA TATOOED, DR. GERONIMO YE AT [facility]   • SIGMOIDOSCOPY N/A 11/23/2019    AN ULCERATED PARTIALLY OBSTRUCTING MASS IN MID RECTUM, AREA TATTOOED, DR. GERONIMO YE AT [facility]   • TRANSRECTAL ULTRASOUND N/A 7/24/2019    Procedure: ULTRASOUND TRANSRECTAL;  Surgeon: Geronimo Ye MD;  Location: [facility] ENDOSCOPY;  Service: General   • URETEROSCOPY LASER LITHOTRIPSY WITH STENT INSERTION Right 10/30/2019    DR. ESTUARDO BEASLEY AT [place]   • VAGINAL DELIVERY N/A 09/18/1998   • VENOUS ACCESS DEVICE (PORT) INSERTION Left 8/9/2019    Procedure: INSERTION VENOUS ACCESS DEVICE;  Surgeon: Sj Joseph MD;  Location: [facility] OR [facility];  Service: General   • WISDOM TOOTH EXTRACTION Bilateral 1993       HEMATOLOGIC/ONCOLOGIC HISTORY:      The patient reports she has intermittent rectal bleeding and urgency, mucous with her stool, starting sometime in 2016. At that time she was referred to GI service, and was diagnosed of irritable bowel syndrome. Nevertheless she had worsening urgency for bowel movement, and had a couple of incidents losing control of stool. She was recently seen by Roland Thorpe MD again and had colonoscopy and EGD exam on 07/08/2019. She was found having a circumferential rectal mass. According to the procedure note, the patient had a fungating circumferential bleeding 4 cm mass in the middle rectum, at distance between 13 cm and 17 cm from the anus. Mass was causing partial obstruction. There were also colon polyps found at the hepatic flexure and also at the rectosigmoid junction 23 cm from the anus. Both were resected and retrieved. EGD examination reported grade A esophagitis at the GE junction and patchy discontinuous erythema and aggravation of the mucosa without bleeding in the stomach  body. There is normal mucosa of the duodenum. Pathology evaluation from this procedure reported moderately differentiated adenocarcinoma involving the rectal mass. The rectal sigmoid junction polyp was tubular/tubulovillous adenoma with high grade dysplasia negative for invasive malignancy. The hepatic flexure polyp was also tubular/tubulovillous adenoma negative for high grade dysplasia or malignancy. The biopsy from the esophagus reports squamocolumnar mucosa with inflammatory changes suggestive of mild reflux esophagitis, negative for interstitial metaplasia. Gastric biopsy was benign and duodenal biopsy was also benign. There is no mention of H-pylori.     Patient was subsequently referred to colorectal surgeon Padmaja Ye MD for further evaluation. The patient had CT scan examination for chest, abdomen and pelvis requested by Dr. Ye and were done on 07/13/2019. The study reported no evidence of lymphadenopathy in the abdomen and pelvis. There was wall thickening of the rectosigmoid junction. Normal spleen, pancreas, adrenal glands and kidneys. There was fatty liver infiltration but no focal lymphatic lesions. There was a small 6-7 mm noncalcified nodule in the right upper lobe and a tiny 3 mm subpleural nodule in the right middle lobe. No mediastinal or hilar lymphadenopathy.     Dr. Ye performed staging rectal ultrasound examination. This study reported tumor penetrating through the muscularis propria as T3 disease; there were no lymph nodes identified.    She had a hospitalization in late September 2019 because of newly discovered pulmonary emboli.  She was on prophylactic Lovenox prior to the incident of PE.  Now she is on full dose Xarelto anticoagulation.  Patient reports no further chest pain dyspnea.  She denies bleeding or bruising.  During her hospitalization, she was seen by Dr. Ye, who plans to have surgery 8 to 12 weeks after finishing radiation therapy.  She finished her radiation on  9/19/2019.     I noticed patient also presented to the emergency room on 10/1/2019 complaining of right flank area pain.  I reviewed the images studies and indeed she has a very small 1 to 2 mm obstructing kidney stone in the UV junction.  Patient is still symptomatic with some pains and dysuria.  She denies fever sweating or chills.    Laboratory study on 10/7/2019 reported normal CBC including hemoglobin 13.1, platelets 301,000, WBC 6170 and ANC 4900 lymphocytes 590 monocytes 480.      The nurse reported malfunction of port-a-catheter on 10/7/2019.  Port study on 10/8/2019 showed fibrin sheath around the distal aspect of the Mediport catheter in the SVC. This does not appear to hinder injection, but does prevent aspiration at this time.    Patient underwent surgical resection of the colon on 12/2/2019 with Dr. Ye.  Pathology evaluation reported residual T3 disease, also 1 out of 14 lymph nodes positive for malignancy.  So this patient in either had at least stage IIIb disease (T3 N1 M0?).      Adjuvant chemotherapy FOLFOX 6 will be started on 1/22/2020.  Laboratory study reported iron saturation 10%, free iron 31 TIBC 319 and ferritin 168.  Her hemoglobin was 11.8, WBC 5600, and platelets 347,000.    Patient was here on 02/12/2020 for cycle 2 of her FOLFOX.  This is delayed x1 week secondary to neutropenia.  The patient ultimately received 3 days worth of Neupogen with recovery of her blood counts.  Of note, the patient struggled with significant nausea without any episodes of vomiting following cycle #1 of chemotherapy resulting in significant weight loss.  She is up 12 pounds over the last week as her appetite has normalized.  We will add Emend to her care plan.    She is having several loose stools per her colostomy and has been hesitant to take Imodium due to prior history of constipation.  I encouraged her to try this with a maximum of 8 tablets/day.  She denies any infectious symptoms including fevers or  chills.  No excess bleeding or bruising noted.  She had the expected cold sensitivity related to the Oxaliplatin for about 3 days following treatment.    Labs from 02/12/2020 demonstrated total white blood cell count of 5.14, ANC of 3.06, hemoglobin of 11.2, platelets of 211,000.  She was found to be iron deficient last week and is intolerant to oral iron secondary to GI distress.  For this reason, she initiated IV iron therapy with Injectafer last week.  She had received her second dose 02/12/2020    Patient has normalized hemoglobin 12.2 on 2/26/2020.    She reported on 5/5/2020 she had a recent visit to the emergency department for what was suspected to be an allergic reaction.  She called our on-call service on Saturday with reports of hand and face swelling.  She was instructed to proceed to the emergency department at which time she was assessed and prescribed a Medrol Dosepak.  She had just completed her 5-FU and was unhooked on Friday, 5/3/2020.  Her symptoms have improved.  She does report persistent hyperpigmentation and mild swelling of the palms of the hands but this is much improved.  It was her right hand specifically that was swollen.  Her facial swelling has resolved.  She continues on Cefdinir nd since has 1 day remaining, she was prescribed cefdinir for a UTI requiring hospitalization at the end of April.  Reports no new symptoms.  Her labs are stable.  She is scheduled for treatment again.    Patient states at this time she is not tolerating her chemotherapy well.     Patient seen in the emergency department on 5/2/2020 for what was suspected to be an allergic reaction.  She called our on-call service on Saturday explaining that she was experiencing hand and face swelling.  She was instructed to proceed to the emergency department at which time she was assessed and prescribed a Medrol Dosepak.  She had just completed her 5-FU and was unhooked on Friday, 5/1/2020.      She reports since her ED  visit on 5/2/2020 her symptoms have not improved. Her hands and feet were swollen upon presenting to the ED and she could barely make a fist. She states that she feels so swollen she is not able to stand it. She states that her skin on the hands and feet are peeling extensively as well, besides changing color to more dark.     She also reports significant fatigue after her first week of the 5-FU treatment but she expected this side effect. She also notices significant increase in her neuropathy to the point that she is not able to even walk around in her home without her house slippers due to irritation from her rugs. She denies and associated nausea or vomiting at this time. She also has episodes of epistaxis every day after the chemotherapy cycle #7.  She does report working full-time during the week of chemotherapy.    Laboratory studies, 5/13/2020, show moderate thrombocytopenia platelets 123,000, low normal WBC 4140 including ANC 2720 and normal hemoglobin 13.4.  Because significant hand-foot syndrome, will decrease both 5-FU and oxaliplatin by 50%, and eliminate bolus dose of 5-FU.    On 5/21/2020 patient had a PET scan performed which showed no convincing evidence of residual disease in abdomen or pelvis, no metastatic disease within the chest or neck.     Cycle #8 FOLFOX 6 was given on 5/13/2020, with 50% dose reduction for both 5-FU and oxaliplatin, and also examination of bolus 5-FU.  She states on 5/27/2020 that with the recent reduction of the chemotherapy she feels significantly better. She has more energy and is able to do her daily routine.      PET scan examination on 5/21/2020 reported no evidence of metastatic disease in chest abdomen pelvis.  There was a stable 6 mm right upper lobe pulmonary nodule.    Laboratory studies on 5/27/2020 showed mild leukocytopenia WBC 3720 but a normal ANC 2250 and lymphocytes 630.  Normal platelets 163,000 and hemoglobin 12.6.  Chemistry lab reported normal renal  function, liver function panel and a electrolytes, elevated glucose 164.    Laboratory studies 6/24/2020, showed normal hemoglobin 13.4 but macrocytosis .9.  Normal platelets 210,000 and WBC 5870.  She had normal CMP.     Patient last time was here in late June 2020.  Since that time she had surgical procedure for low anterior colon resection, coloanal anastomosis on 8/3/2020.  She later developed a perirectal abscess, required surgical drainage on 8/14/2020.  She was discharged on 8/27/2020.    Patient reports to me she still has lower abdominal wall vacuum suction in place.  She denies fever sweating chills.  Performance status is ECOG 1.  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  She does have visiting nurse come to the home for wound care.    CT scan for chest abdomen pelvis on 9/9/2020 reported a new 8 mm noncalcified pulmonary nodule in the left lower lobe.  Otherwise stable right upper lobe 6 mm pulmonary nodule, and no evidence of disease recurrence in the abdomen or pelvis.     Laboratory study on 9/16/2020 reported elevated AST 55, ALT 95, alk phosphatase 143 but normal total bilirubin 0.3.  Chemistry lab otherwise unremarkable.  She has completed normal CBC.      Because of the abnormal CT scan examination for chest abdomen pelvis on 9/9/2020 reported a new 8 mm noncalcified pulmonary nodule in the left lower lobe, we obtained a PET scan examination for further evaluation, which was done on 9/18/2020.  This study reported several pulmonary nodules, some of them are hypermetabolic, newly developed compared to previous PET scan in May 2020.  Certainly does highly suspicious for metastatic disease.  There are also hypermetabolic activity in the abdomen and pelvic area which is hard to differentiate from surgical scar versus metastatic malignancy.    Laboratory study on 10/13/2020 reported normal CBC with Hb 13.9, platelets 302,000 and WBC 5520 including ANC 3830.   Chemistry lab reported normalized AST 20, alk phosphatase 116 improved ALT 35, and maintains normal bilirubin, with normal electrolytes and liver function panel.     Patient was started on first cycle of palliative chemotherapy FOLFIRI on 10/13/2020.    MEDICATIONS    Current Outpatient Medications:   •  clonazePAM (KlonoPIN) 1 MG tablet, Take 1 tablet by mouth 3 (Three) Times a Day As Needed for Anxiety or Seizures., Disp: 90 tablet, Rfl: 0  •  dicyclomine (BENTYL) 20 MG tablet, TAKE 1 TABLET BY MOUTH EVERY 6 HOURS AS NEEDED, Disp: 120 tablet, Rfl: 2  •  diphenoxylate-atropine (LOMOTIL) 2.5-0.025 MG per tablet, Take 1 tablet by mouth 4 (Four) Times a Day As Needed for Diarrhea., Disp: 120 tablet, Rfl: 1  •  escitalopram (LEXAPRO) 20 MG tablet, Take 1 tablet by mouth Daily., Disp: 90 tablet, Rfl: 3  •  famotidine (PEPCID) 20 MG tablet, TAKE 1 TABLET BY MOUTH TWICE A DAY (Patient taking differently: Take 20 mg by mouth Every Evening.), Disp: 180 tablet, Rfl: 1  •  HYDROcodone-acetaminophen (NORCO) 7.5-325 MG per tablet, Take 1 tablet by mouth Every 6 (Six) Hours As Needed for Moderate Pain ., Disp: 240 tablet, Rfl: 0  •  Loratadine (CLARITIN) 10 MG capsule, Take 10 mg by mouth Every Evening., Disp: , Rfl:   •  ondansetron (ZOFRAN) 8 MG tablet, Take 1 tablet by mouth 3 (Three) Times a Day As Needed for Nausea or Vomiting., Disp: 60 tablet, Rfl: 5  •  prochlorperazine (COMPAZINE) 10 MG tablet, Take 1 tablet by mouth Every 6 (Six) Hours As Needed for Nausea or Vomiting., Disp: 30 tablet, Rfl: 2  •  rivaroxaban (Xarelto) 20 MG tablet, Take 1 tablet by mouth Daily., Disp: 90 tablet, Rfl: 3  •  sulfamethoxazole-trimethoprim (Bactrim DS) 800-160 MG per tablet, Take 1 tablet by mouth 2 (Two) Times a Day., Disp: 14 tablet, Rfl: 0  •  phenazopyridine (Pyridium) 100 MG tablet, Take 1 tablet by mouth 3 (Three) Times a Day., Disp: 6 tablet, Rfl: 0    ALLERGIES:   No Known Allergies    SOCIAL HISTORY:       Social History      Tobacco Use   • Smoking status: Heavy Tobacco Smoker     Packs/day: 1.00     Years: 24.00     Pack years: 24.00     Types: Electronic Cigarette, Cigarettes   • Smokeless tobacco: Never Used   • Tobacco comment: LAST CIGARETTE 8/12/2020   Substance Use Topics   • Alcohol use: Not Currently   • Drug use: No         FAMILY HISTORY:   Mother has positive factor V Leiden mutation, although she did not have thrombosis, mother also is coronary disease with stenting, she also is occasional bruising.  Maternal grandmother had DVT, she had multiple surgical procedures.  Patient mother's half-brother had metastatic colon cancer at diagnosis in his 50s.  Maternal great aunt has breast cancer.  Patient will follow his skin cancer in his 60s, exclusive.    REVIEW OF SYSTEMS:  Review of Systems   Constitutional: Negative for activity change, appetite change, chills, diaphoresis, fatigue, fever and unexpected weight change.   HENT: Negative for congestion, hearing loss, mouth sores, nosebleeds and trouble swallowing.    Eyes: Negative for photophobia and visual disturbance.   Respiratory: Negative for cough, chest tightness and shortness of breath.    Cardiovascular: Negative for chest pain, palpitations and leg swelling.   Gastrointestinal: Negative for abdominal distention, abdominal pain, anal bleeding, constipation, diarrhea and nausea.   Endocrine: Negative for cold intolerance and heat intolerance.   Genitourinary: Negative for dysuria and hematuria.   Musculoskeletal: Negative for arthralgias, back pain and joint swelling.   Skin: Positive for wound (Low abdomen wound with vacuum suction in place). Negative for color change and rash.   Allergic/Immunologic: Positive for immunocompromised state (Secondary to adjuvant chemotherapy). Negative for environmental allergies and food allergies.   Neurological: Negative for dizziness, numbness and headaches.   Hematological: Negative for adenopathy. Does not bruise/bleed easily.  "  Psychiatric/Behavioral: Negative for agitation, behavioral problems, confusion and suicidal ideas.   No change of review of system on 10/27/2020.           Vitals:    10/27/20 0750   BP: 122/85   Pulse: 97   Resp: 14   Temp: 97.3 °F (36.3 °C)   SpO2: 96%   Weight: 96.6 kg (212 lb 14.4 oz)   Height: 166 cm (65.35\")   PainSc: 0-No pain     Current Status 10/27/2020   ECOG score 0     PHYSICAL EXAM:      CONSTITUTIONAL:  Well-developed, well-nourished female, no distress, looks comfortable.  EYES:  Conjunctiva and lids unremarkable.  Pupils equal and round.  EARS:   Ears appear unremarkable.    NOSE:  Patient wears mask due to the pandemic coronavirus infection.   MOUTH: Same as above.  THROAT: Same as above.  RESPIRATORY:  Normal respiratory effort.  Lungs clear to auscultation bilaterally.  CARDIOVASCULAR: Regular rhythm and rate.  Normal S1, S2.  No murmurs.   GASTROINTESTINAL: Abdomen no tender.  Ileostomy in the right lower abdomen.  Bowel sounds normal.  Nontender.     LYMPHATIC:  No cervical, supraclavicular lymphadenopathy.  MUSCULOSKELETAL:  Unremarkable gait and station.  No cyanosis or clubbing.  No lower extremity edema.   SKIN:  Warm.  No rashes.  Lower abdomen wound has vacuum suction in place, it is smaller.  PSYCHIATRIC:  Normal judgment and insight.      RECENT LABS:    Lab Results   Component Value Date    WBC 4.06 10/27/2020    HGB 13.4 10/27/2020    HCT 42.1 10/27/2020    MCV 92.7 10/27/2020     10/27/2020     Lab Results   Component Value Date    NEUTROABS 2.57 10/27/2020     Glucose   Date Value Ref Range Status   10/27/2020 96 74 - 124 mg/dL Final     BUN   Date Value Ref Range Status   10/27/2020 11 6 - 20 mg/dL Final     Creatinine   Date Value Ref Range Status   10/27/2020 0.83 0.60 - 1.10 mg/dL Final   09/09/2020 0.60 0.60 - 1.30 mg/dL Final     Comment:     Serial Number: [id number]:  [id number]     Sodium   Date Value Ref Range Status   10/27/2020 135 134 - 145 mmol/L Final "     Potassium   Date Value Ref Range Status   10/27/2020 4.6 3.5 - 4.7 mmol/L Final     Chloride   Date Value Ref Range Status   10/27/2020 103 98 - 107 mmol/L Final     CO2   Date Value Ref Range Status   10/27/2020 21.1 (L) 22.0 - 29.0 mmol/L Final     Calcium   Date Value Ref Range Status   10/27/2020 9.0 8.5 - 10.2 mg/dL Final     Total Protein   Date Value Ref Range Status   10/27/2020 7.1 6.3 - 8.0 g/dL Final     Albumin   Date Value Ref Range Status   10/27/2020 3.60 3.50 - 5.20 g/dL Final     ALT (SGPT)   Date Value Ref Range Status   10/27/2020 24 0 - 33 U/L Final     AST (SGOT)   Date Value Ref Range Status   10/27/2020 17 0 - 32 U/L Final     Alkaline Phosphatase   Date Value Ref Range Status   10/27/2020 118 (H) 38 - 116 U/L Final     Total Bilirubin   Date Value Ref Range Status   10/27/2020 <0.2 (L) 0.2 - 1.2 mg/dL Final     eGFR Non  Amer   Date Value Ref Range Status   10/27/2020 76 >60 mL/min/1.73 Final     BUN/Creatinine Ratio   Date Value Ref Range Status   10/27/2020 13.3 7.3 - 30.0 Final     Anion Gap   Date Value Ref Range Status   10/27/2020 10.9 5.0 - 15.0 mmol/L Final     Lab Results   Component Value Date    CEA 1.32 09/16/2020         Assessment/Plan      ASSESSMENT:   1.  Rectal cancer, rectal ultrasound examination reported T3N0 disease without lymphadenopathy.   · CT scan of chest, abdomen and pelvis reported no lymphadenopathy in the abdomen, pelvis or chest. She does have small pulmonary nodule 6-7 mm and 2 mm with indeterminate feature. There is no solid evidence of metastatic disease otherwise.   · Based on the CT scan and rectal ultrasound imaging studies, this patient had stage IIA (T3N0M0) disease.    · She had PET scan examination on 8/8/2019 which reported a hypermetabolic right upper lobe pulmonary nodule 6 mm with SUV 5.6.  This is suspicious for metastatic disease however it is too small to be biopsied.  This patient may have stage IV disease.   · She initiated  concurrent radiation with continuous 5-FU on 8/12/2019.  · Patient finished concurrent chemoradiation therapy.  · Patient underwent surgical resection of the rectal tumor and diverting loop ileostomy on 12/2/2019 with Dr. Ye.  Pathology evaluation reported residual T3 disease, with 1 out of 14 lymph nodes positive for malignancy.  Certainly this patient has at least stage IIIb rectal cancer (T3 N1 M0?)  · On 1/22/2020 adjuvant chemotherapy FOLFOX 6 regimen initiated.    · On 2/5/2022 cycle #2 was delayed secondary to neutropenia.  She was given 3 days of Granix.   · Emend added with cycle 3.  With improved nausea control  · Continuing home Granix x3 days following 5-FU disconnect  · 3/11/2020 due for cycle 4, however, she is experiencing progressive abdominal pain and occasional fevers.   · CT scan performed on 3/13/2020 reveals no evidence of progressive or recurrent disease.  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  · Patient hospitalized 4/24-4/26/20 after cycle 5 of chemotherapy with acute UTI.  CT abdomen/pelvis noting fluid collection in the presacral region having diminished in size compared to CT dated 3/13/2020.  Patient was evaluated by Dr. Ye while in the hospital with further plans to evaluate possible colovaginal fistula following completion of chemotherapy.  Patient did respond to IV antibiotics and discharged home on oral cefdinir.  · 4/29/2020 cycle 6 of FOLFOX.  Urinary symptoms have resolved   · Patient seen in the [facility] ED on 5/2/2020 for what was suspected to be an allergic reaction.  She called our service on Saturday explaining that she was experiencing hand and face swelling.  She was instructed to proceed to the emergency department at which time she was assessed and prescribed a Medrol Dosepak.  She had just completed her 5-FU and was unhooked on Friday, 5/1/2020.  Her symptoms have resolved in the office on assessment, 5/5/2020.  · The patient had grade 3 hand-foot syndrome  based on symptomology.  Patient had cycle #8 of 5-FU on 5/13/2020. Due to her symptoms and poor tolerance to the 5-FU, her treatment dose will be reduced 50% for oxaliplatin and infusional 5-FU.  Bolus 5-FU will be discontinued..  · On 5/13/2020  We discussed further treatment options pending the scan results.  If she has indeed residual disease or metastatic disease, we will switch her to irinotecan plus Avastin or anti-EGFR monoclonal antibody.  She will need a further molecular testing of her tumor samples in that case.  · On 5/21/2020 patient had a PET scan and it showed no evidence of of metastatic disease in the neck, chest, abdomen or pelvis.  There was fluid accumulation/abscess.   · On 5/27/2020 I discussed with the patient that we can discontinue chemotherapy at this time.  She will follow-up with Dr. Ye to discuss a possibility to reverse the ileostomy.  We likely will obtain anther PET scan in 3 to 4 months for reassessment.    · Patient was seen by Dr. Ye on 6/19/2019 for evaluation and to discuss possible take down of her ileostomy.  She is scheduled to have a gastrografin enema on 7/2/2020 to evaluate for a fistula, and then a colonoscopy on 7/15/2020, both done by Dr. Ye.  She states that based on the above imaging and procedure findings, she may have a more extensive surgery done or just have her ileostomy reversed, which would likely be done in August 2020.  This will all be coordinated under the care of Dr. Ye.     · I reviewed scan results with the patient on 9/16/2020 for the most recent CT scan from 09/09/2020 and also compared it to her previous PET scan examination from 05/21/2020 and also the original PET scan from 08/09/2019.  The original PET scan there is a small right upper lobe pulmonary nodule 6 mm with SUV 5.6. So in the 05/2020 PET scan the nodule is still there but activity seems much less and this most recent CT scan examination also documented the preexisting small  pulmonary nodule however there is a new left lower lobe pulmonary nodule 8 mm in size and I shared with the patient today this nodule is suspicious for metastatic disease. Laboratory studies reported minimal CEA level 1.32 on 09/09/2020. Liver function panel was only marginally abnormal with the ALT 45, the remaining is normal. However laboratory study today showed worsening AST 55, ALT 95 and alkaline phosphatase 143 but is still normal, total bilirubin 0.3.   · So I discussed with the patient on 9/16/2020 recommended to have repeat PET scan examination for assessment because the left lower lobe pulmonary nodule is too small to be biopsied, and if the PET scan reports hypermetabolic lesion, I recommended to have stereotactic radiation therapy. Explained to patient that she is not a really good candidate to have thoracotomy for resection because she still has unhealed wound in the abdomen. I think the stereotactic radiation therapy will be a more feasible choice.  · Laboratory study on 9/16/2020 reported worsening liver function panel with both elevated AST, ALT and also slightly worsened alkaline phosphatase despite having normal total bilirubin. I recommended to repeat laboratory study for reevaluation. The only new medication she has in the past several days is Augmentin but otherwise no change of condition. She denies any recent viral infection. We will monitor this.   · PET scan examination obtained on 9/18/2020 showed metastatic disease.  It reported a few hypermetabolic pulmonary nodules, and some new small pulmonary nodules, or highly suspicious for metastatic disease.  She also has hypermetabolic activity in the abdomen and pelvic area which could be related to scar tissue from her recent surgery versus metastatic disease.  Nevertheless overall picture fits with metastatic cancer.  · I discussed with the patient on 9/20/2020, we reviewed the PET scan images together, and I recommended to have systematic  chemotherapy, I do not think stereotactic reading therapy to the hypermetabolic lesions in the lungs warranted at this time because even those will be treated with reading therapy, she will still need systematic chemotherapy.  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  I did discuss with the patient using anti-EGFR monoclonal antibody versus anti-VEGF monoclonal antibody.     · Palliative chemotherapy cycle#1 FOLFIRI was started on 10/13/2020.  No bolus 5-FU given considering previous poor tolerance.  Genetic studies still pending.   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    · I discussed with the patient 10/27/2020, because of the K-saskia mutation, she would not be a candidate for anti-EGFR monoclonal antibody such as Erbitux or vectibix.  She could be a candidate for anti-VEGF monoclonal antibody, however because of active wound, she is not a candidate right now at this moment.  · Cycle #2 palliative FOLFIRI will be given on 10/27/2020.    2 .  Pulmonary nodule, hypermetabolic on previous PET scan.   · Her PET scan examination from 8/8/2019 reported a small 8 mm right upper apex pulmonary nodule but hypermetabolic SUV 5.1.  That was too small to be biopsied, however there was always a possibility of metastatic disease considering that high activity despite a such a small nodule.  · Her chest CT scan examination from 3/13/2020 reported this shrank to 6 mm.    · PET scan examination on 5/21/2020 reported no metabolic activity at this residual nodule.  This needs to be monitored.    · PET scan examination 9/18/2020 reported couple of hypermetabolic pulmonary nodules, besides a few extra small pulmonary nodules.  Those are highly suspicious for metastatic disease.      3.   Pulmonary emboli with background of positive factor V Leiden mutation   · The patient has heterozygous factor V Leiden mutation and  therefore was on low-dose anticoagulation with Lovenox, 40 mg daily given her increased risk of thrombosis with MediPort and GI malignancy.    · Nevertheless this patient was found to having pulmonary emboli on 9/20/2019 despite low-dose Lovenox.  She had a brief hospitalization in late September 2019, she was started on full dose Xarelto anticoagulation per protocol.    · The patient continues on Xarelto and is tolerating this well.  She is having some issues with slight nosebleeds as outlined above.   · No issues reported on 10/13/2020.  Patient will continue Xarelto for now.      4.  This patient also has strong family history for malignancy the patient had appointment with genetic counseling on September 3, 2019.  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    5.  Mild anemia, improved since her surgery.    · She also has a history of iron deficiency.  Iron studies reveal a saturation of just 10%.  She was started on oral iron but was unable to tolerate due to GI side effects.   · Status post Injectafer 2/5/2020 and 2/12/2020   · Hemoglobin was within normal limits on 4/1/2020 at 13.1  · Hemoglobin 13.4 on 5/2/2020  · Hemoglobin is 13.4 on 5/13/2020  · Hemoglobin stable at 12.6 on 5/27/2020.  · Hemoglobin stable at 13.4 on 6/24/2020.  · Hemoglobin 10.7 on 8/12/2020, postop.    · Patient has improved and normalized hemoglobin 15.4 on 9/16/2020.   · Hb 13.9 on 10/13/2020.  We started first cycle of FOLFIRI chemotherapy.  · Hemoglobin 13.4 on 10/27/2020.  We will continue to monitor.    6.  Peripheral neuropathy secondary to chemotherapy.    · This is mild involving bilateral hands and feet as reported on 9/16/2020.   · Will avoid chemotherapy with oxaliplatin.    7.  Hand-foot syndrome grade 3.    · It become so significant after cycle #7 FOLFOX treatment.  Discussed with patient on 5/13/2020, will discontinue the bolus 5-FU dose, and also decrease 50% of the infusion dose through the pump.   · We also use Medrol  Dosepak for possible recurrent symptomology.  She responded this well.   · Her hand-foot syndrome improved.  · For the FOLFIRI regimen, will not give bolus 5-FU, but will give full dose of infusion 5-FU as calculated.    8.  Hyperlacrimation and mild scleral irritation related to 5-FU.  She has been taking steroid eyedrops.  This has improved her hyperlacrimation.  · Not a current issue.  However expecting this will recur once she has been started back on 5-FU treatment.    9.  Abnormal liver function panel.  · Improved liver function panel 9/18/2020.  This is probably related to her recent infection.  · She has normalized AST, alk phosphatase, and a much improved only marginally elevated ALT 35 on 10/13/2020.    · Normalized liver function panel on 10/27/2020.  Continue to monitor.    10.  Acute urinary tract infection 10/22/2020.  Patient to has been given Bactrim.  She will continue to finish this tomorrow.      PLAN:  1. Proceed ahead with cycle #2 FOLFIRI treatment today.  To be aware, there would be no bolus dose of 5-FU due to previous hand-foot syndrome.    2. Patient is positive for K-saskia mutation by comprehensive NGS from [facility] laboratory.  Not a candidate for anti-EGFR monoclonal antibody.  Potential candidate for anti-VEGF monoclonal antibody, but due to active wound, will delay that.  3. Patient will finish Bactrim today for UTI.  4. Continue Xarelto 20 mg daily.   5. Continue wound care.   6. Patient will return in 2 weeks to see me for reevaluation, with lab studies prior to cycle 3 chemotherapy.  7. I have asked the patient to call the office should she experience new or worsening symptoms.     I discussed with the patient of the genetic study results from [facility] laboratory.  Explained the meaning of the test results to patient.  She voiced understanding.    This patient is on high risk medication and needs close monitoring.       ALEXANDRU HUNT M.D., Ph.D.    10/27/2020.      CC:   Deepika Vela III NP-C   MD Perla Bowers MD

## 2020-11-04 ENCOUNTER — TELEPHONE (OUTPATIENT)
Dept: ONCOLOGY | Facility: CLINIC | Age: 40
End: 2020-11-04

## 2020-11-06 ENCOUNTER — TELEPHONE (OUTPATIENT)
Dept: SURGERY | Facility: CLINIC | Age: 40
End: 2020-11-06

## 2020-11-09 ENCOUNTER — OFFICE VISIT (OUTPATIENT)
Dept: PSYCHIATRY | Facility: HOSPITAL | Age: 40
End: 2020-11-09

## 2020-11-09 DIAGNOSIS — F32.1 CURRENT MODERATE EPISODE OF MAJOR DEPRESSIVE DISORDER WITHOUT PRIOR EPISODE (HCC): ICD-10-CM

## 2020-11-09 DIAGNOSIS — F41.1 GENERALIZED ANXIETY DISORDER: Primary | ICD-10-CM

## 2020-11-09 PROCEDURE — 90792 PSYCH DIAG EVAL W/MED SRVCS: CPT | Performed by: NURSE PRACTITIONER

## 2020-11-09 RX ORDER — MIRTAZAPINE 15 MG/1
15 TABLET, ORALLY DISINTEGRATING ORAL NIGHTLY
Qty: 30 TABLET | Refills: 2 | Status: SHIPPED | OUTPATIENT
Start: 2020-11-09 | End: 2020-12-07 | Stop reason: SDUPTHER

## 2020-11-10 ENCOUNTER — OFFICE VISIT (OUTPATIENT)
Dept: ONCOLOGY | Facility: CLINIC | Age: 40
End: 2020-11-10

## 2020-11-10 ENCOUNTER — INFUSION (OUTPATIENT)
Dept: ONCOLOGY | Facility: HOSPITAL | Age: 40
End: 2020-11-10

## 2020-11-10 VITALS
OXYGEN SATURATION: 96 % | DIASTOLIC BLOOD PRESSURE: 83 MMHG | SYSTOLIC BLOOD PRESSURE: 114 MMHG | TEMPERATURE: 97.8 F | HEART RATE: 97 BPM | BODY MASS INDEX: 34.59 KG/M2 | WEIGHT: 214.3 LBS | RESPIRATION RATE: 16 BRPM

## 2020-11-10 DIAGNOSIS — Z45.2 ENCOUNTER FOR FITTING AND ADJUSTMENT OF VASCULAR CATHETER: ICD-10-CM

## 2020-11-10 DIAGNOSIS — I26.99 OTHER PULMONARY EMBOLISM WITHOUT ACUTE COR PULMONALE, UNSPECIFIED CHRONICITY (HCC): ICD-10-CM

## 2020-11-10 DIAGNOSIS — C20 ADENOCARCINOMA OF RECTUM (HCC): ICD-10-CM

## 2020-11-10 DIAGNOSIS — C20 ADENOCARCINOMA OF RECTUM (HCC): Primary | ICD-10-CM

## 2020-11-10 DIAGNOSIS — D68.51 HETEROZYGOUS FACTOR V LEIDEN MUTATION (HCC): ICD-10-CM

## 2020-11-10 DIAGNOSIS — Z79.01 ANTICOAGULATION ADEQUATE: ICD-10-CM

## 2020-11-10 DIAGNOSIS — C78.00 MALIGNANT NEOPLASM METASTATIC TO LUNG, UNSPECIFIED LATERALITY (HCC): ICD-10-CM

## 2020-11-10 PROBLEM — D72.819 LEUKOCYTOPENIA: Status: ACTIVE | Noted: 2020-11-10

## 2020-11-10 LAB
ALBUMIN SERPL-MCNC: 3.6 G/DL (ref 3.5–5.2)
ALBUMIN/GLOB SERPL: 1.1 G/DL (ref 1.1–2.4)
ALP SERPL-CCNC: 113 U/L (ref 38–116)
ALT SERPL W P-5'-P-CCNC: 26 U/L (ref 0–33)
ANION GAP SERPL CALCULATED.3IONS-SCNC: 10.3 MMOL/L (ref 5–15)
AST SERPL-CCNC: 17 U/L (ref 0–32)
BASOPHILS # BLD AUTO: 0.01 10*3/MM3 (ref 0–0.2)
BASOPHILS NFR BLD AUTO: 0.3 % (ref 0–1.5)
BILIRUB SERPL-MCNC: 0.2 MG/DL (ref 0.2–1.2)
BUN SERPL-MCNC: 11 MG/DL (ref 6–20)
BUN/CREAT SERPL: 12.9 (ref 7.3–30)
CALCIUM SPEC-SCNC: 9.1 MG/DL (ref 8.5–10.2)
CHLORIDE SERPL-SCNC: 104 MMOL/L (ref 98–107)
CO2 SERPL-SCNC: 22.7 MMOL/L (ref 22–29)
CREAT SERPL-MCNC: 0.85 MG/DL (ref 0.6–1.1)
DEPRECATED RDW RBC AUTO: 51.2 FL (ref 37–54)
EOSINOPHIL # BLD AUTO: 0.18 10*3/MM3 (ref 0–0.4)
EOSINOPHIL NFR BLD AUTO: 4.9 % (ref 0.3–6.2)
ERYTHROCYTE [DISTWIDTH] IN BLOOD BY AUTOMATED COUNT: 15.1 % (ref 12.3–15.4)
GFR SERPL CREATININE-BSD FRML MDRD: 74 ML/MIN/1.73
GLOBULIN UR ELPH-MCNC: 3.2 GM/DL (ref 1.8–3.5)
GLUCOSE SERPL-MCNC: 126 MG/DL (ref 74–124)
HCT VFR BLD AUTO: 41.8 % (ref 34–46.6)
HGB BLD-MCNC: 13.3 G/DL (ref 12–15.9)
IMM GRANULOCYTES # BLD AUTO: 0.02 10*3/MM3 (ref 0–0.05)
IMM GRANULOCYTES NFR BLD AUTO: 0.5 % (ref 0–0.5)
LYMPHOCYTES # BLD AUTO: 0.74 10*3/MM3 (ref 0.7–3.1)
LYMPHOCYTES NFR BLD AUTO: 20.3 % (ref 19.6–45.3)
MCH RBC QN AUTO: 30 PG (ref 26.6–33)
MCHC RBC AUTO-ENTMCNC: 31.8 G/DL (ref 31.5–35.7)
MCV RBC AUTO: 94.4 FL (ref 79–97)
MONOCYTES # BLD AUTO: 0.33 10*3/MM3 (ref 0.1–0.9)
MONOCYTES NFR BLD AUTO: 9 % (ref 5–12)
NEUTROPHILS NFR BLD AUTO: 2.37 10*3/MM3 (ref 1.7–7)
NEUTROPHILS NFR BLD AUTO: 65 % (ref 42.7–76)
NRBC BLD AUTO-RTO: 0 /100 WBC (ref 0–0.2)
PLATELET # BLD AUTO: 205 10*3/MM3 (ref 140–450)
PMV BLD AUTO: 8.3 FL (ref 6–12)
POTASSIUM SERPL-SCNC: 4 MMOL/L (ref 3.5–4.7)
PROT SERPL-MCNC: 6.8 G/DL (ref 6.3–8)
RBC # BLD AUTO: 4.43 10*6/MM3 (ref 3.77–5.28)
SODIUM SERPL-SCNC: 137 MMOL/L (ref 134–145)
WBC # BLD AUTO: 3.65 10*3/MM3 (ref 3.4–10.8)

## 2020-11-10 PROCEDURE — 96416 CHEMO PROLONG INFUSE W/PUMP: CPT

## 2020-11-10 PROCEDURE — 96415 CHEMO IV INFUSION ADDL HR: CPT

## 2020-11-10 PROCEDURE — 99214 OFFICE O/P EST MOD 30 MIN: CPT | Performed by: INTERNAL MEDICINE

## 2020-11-10 PROCEDURE — 25010000002 FLUOROURACIL PER 500 MG: Performed by: INTERNAL MEDICINE

## 2020-11-10 PROCEDURE — 96375 TX/PRO/DX INJ NEW DRUG ADDON: CPT

## 2020-11-10 PROCEDURE — 96367 TX/PROPH/DG ADDL SEQ IV INF: CPT

## 2020-11-10 PROCEDURE — 25010000002 ATROPINE PER 0.01 MG: Performed by: INTERNAL MEDICINE

## 2020-11-10 PROCEDURE — 25010000002 IRINOTECAN PER 20 MG: Performed by: INTERNAL MEDICINE

## 2020-11-10 PROCEDURE — 80053 COMPREHEN METABOLIC PANEL: CPT

## 2020-11-10 PROCEDURE — 25010000002 PALONOSETRON PER 25 MCG: Performed by: INTERNAL MEDICINE

## 2020-11-10 PROCEDURE — 25010000002 DEXAMETHASONE SODIUM PHOSPHATE 100 MG/10ML SOLUTION: Performed by: INTERNAL MEDICINE

## 2020-11-10 PROCEDURE — 96368 THER/DIAG CONCURRENT INF: CPT

## 2020-11-10 PROCEDURE — 25010000002 FOSAPREPITANT PER 1 MG: Performed by: INTERNAL MEDICINE

## 2020-11-10 PROCEDURE — 25010000002 LEUCOVORIN CALCIUM PER 50 MG: Performed by: INTERNAL MEDICINE

## 2020-11-10 PROCEDURE — 96413 CHEMO IV INFUSION 1 HR: CPT

## 2020-11-10 PROCEDURE — 85025 COMPLETE CBC W/AUTO DIFF WBC: CPT

## 2020-11-10 RX ORDER — SODIUM CHLORIDE 9 MG/ML
250 INJECTION, SOLUTION INTRAVENOUS ONCE
Status: CANCELLED | OUTPATIENT
Start: 2020-11-10

## 2020-11-10 RX ORDER — HEPARIN SODIUM (PORCINE) LOCK FLUSH IV SOLN 100 UNIT/ML 100 UNIT/ML
500 SOLUTION INTRAVENOUS AS NEEDED
Status: DISCONTINUED | OUTPATIENT
Start: 2020-11-10 | End: 2020-11-10 | Stop reason: HOSPADM

## 2020-11-10 RX ORDER — ATROPINE SULFATE 0.4 MG/ML
0.25 AMPUL (ML) INJECTION
Status: DISCONTINUED | OUTPATIENT
Start: 2020-11-10 | End: 2020-11-10 | Stop reason: HOSPADM

## 2020-11-10 RX ORDER — PALONOSETRON 0.05 MG/ML
0.25 INJECTION, SOLUTION INTRAVENOUS ONCE
Status: CANCELLED | OUTPATIENT
Start: 2020-11-10

## 2020-11-10 RX ORDER — PALONOSETRON 0.05 MG/ML
0.25 INJECTION, SOLUTION INTRAVENOUS ONCE
Status: COMPLETED | OUTPATIENT
Start: 2020-11-10 | End: 2020-11-10

## 2020-11-10 RX ORDER — SODIUM CHLORIDE 0.9 % (FLUSH) 0.9 %
10 SYRINGE (ML) INJECTION AS NEEDED
Status: CANCELLED | OUTPATIENT
Start: 2020-11-10

## 2020-11-10 RX ORDER — SODIUM CHLORIDE 9 MG/ML
250 INJECTION, SOLUTION INTRAVENOUS ONCE
Status: COMPLETED | OUTPATIENT
Start: 2020-11-10 | End: 2020-11-10

## 2020-11-10 RX ORDER — ATROPINE SULFATE 1 MG/ML
0.25 INJECTION, SOLUTION INTRAMUSCULAR; INTRAVENOUS; SUBCUTANEOUS
Status: CANCELLED | OUTPATIENT
Start: 2020-11-10

## 2020-11-10 RX ORDER — HEPARIN SODIUM (PORCINE) LOCK FLUSH IV SOLN 100 UNIT/ML 100 UNIT/ML
500 SOLUTION INTRAVENOUS AS NEEDED
Status: CANCELLED | OUTPATIENT
Start: 2020-11-10

## 2020-11-10 RX ADMIN — SODIUM CHLORIDE 810 MG: 900 INJECTION, SOLUTION INTRAVENOUS at 09:49

## 2020-11-10 RX ADMIN — DEXTROSE MONOHYDRATE 365 MG: 50 INJECTION, SOLUTION INTRAVENOUS at 09:49

## 2020-11-10 RX ADMIN — DEXAMETHASONE SODIUM PHOSPHATE 12 MG: 10 INJECTION, SOLUTION INTRAMUSCULAR; INTRAVENOUS at 09:21

## 2020-11-10 RX ADMIN — PALONOSETRON 0.25 MG: 0.05 INJECTION, SOLUTION INTRAVENOUS at 08:47

## 2020-11-10 RX ADMIN — FLUOROURACIL 4850 MG: 50 INJECTION, SOLUTION INTRAVENOUS at 11:35

## 2020-11-10 RX ADMIN — ATROPINE SULFATE 0.25 MG: 0.4 INJECTION, SOLUTION INTRAMUSCULAR; INTRAVENOUS; SUBCUTANEOUS at 09:47

## 2020-11-10 RX ADMIN — SODIUM CHLORIDE 250 ML: 9 INJECTION, SOLUTION INTRAVENOUS at 08:47

## 2020-11-10 RX ADMIN — SODIUM CHLORIDE 100 ML: 9 INJECTION, SOLUTION INTRAVENOUS at 08:48

## 2020-11-10 NOTE — PROGRESS NOTES
REASON FOR FOLLOW UP:     1. Rectal cancer, rectal ultrasound examination in July 2019 reported T3N0 disease without lymphadenopathy. She does have small pulmonary nodule 6-7 mm and 2 mm with indeterminate feature. There is no solid evidence of metastatic disease otherwise. Patient has stage IIA (T3N0M0) disease.  2. The patient is heterozygous factor V Leiden, was on prophylactic anticoagulation with Lovenox 40 mg daily given her increased risk of thrombosis with MediPort and GI malignancy.   3. PET scan on 8/8/2019 reported an 8 mm hypermetabolic right upper lobe pulmonary nodule, which is suspicious for metastatic as well.    4. Patient had a PowerPort placement on the left upper chest by Dr. Joseph on 8/9/2019.  5. Patient was started on concurrent infusional 5-FU chemoradiation therapy on 8/12/2019, with planned complete surgical resection and further adjuvant chemotherapy with intention to cure the disease.   6. Patient underwent surgical resection of the primary rectal cancer by Dr. Ye on 12/2/2019.  Pathology evaluation reported residual T3N1 disease stage IIIb.  7. Diarrhea related to therapy and radiation.   8. Patient was started cycle 1 day 1 of adjuvant FOLFOX 6 on 1/23/2020.  9. On 2/5/2020 FOLFOX 6 cycle 2 delayed secondary to neutropenia.  Patient was given 3 days of Granix injection.  After cycle #2 we planned 3 days of Granix with each cycle.   10. Patient also intolerant of oral iron.  Patient received 2 doses of IV injectafer on 02/05/2020 and 02/12/2020.   11. 02/12/2020 Proceed with cycle #2 of FOLFOX 6 with 3 days of Granix.  12. On 3/11/2020 cycle 4 postponed secondary to abdominal pain and occasional low-grade fevers.  CT scans ordered.  13. Cycle #4 resumed after CT scan revealed no evidence of disease.  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 Ye. patient has no fever.  Continue to observe.   14. Grade 3  hand-foot syndrome secondary to 5-FU after cycle #7 FOLFOX 6 chemotherapy, prompting ER visit.  Also has worsening peripheral neuropathy.   15. Cycle #8 FOLFOX 6 was given on 5/13/2020, with 50% dose reduction for both 5-FU and oxaliplatin, and also examination of bolus 5-FU.   16. PET scan examination on 5/21/2020 reported no evidence of metastatic disease in the chest abdomen pelvis.  Stable 6 mm RUL pulmonary nodule.  17. On 5/27/2020, I discussed with the patient that we can discontinue chemotherapy at this time.   18. Patient had a surgical procedure for low anterior colon resection, coloanal anastomosis on 8/3/2020.  19. CT scan for chest abdomen pelvis on 9/9/2020 reported a new 8 mm noncalcified pulmonary nodule in the left lower lobe of the lung.  Otherwise stable right upper lobe 6 mm pulmonary nodule, and no evidence of disease recurrence in the abdomen or pelvis.  20. PET scan examination on 9/18/2020 reported multiple hypermetabolic small pulmonary nodules/ new pulmonary nodules.   21. Patient was started on pelvic chemotherapy with FOLFIRI regimen on 10/13/2020.   22. 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 It reported tumor mutation burden 5 Muts/Mb, microsatellite stable, TP53 mutation [id number], and others.    HISTORY OF PRESENT ILLNESS:  The patient is a 40 y.o. female with the above medical history who presents today for 2-week evaluation prior to cycle #3 palliative chemotherapy with FOLFIRI regimen.    Patient reports he is able pneumonia has much improved.  He is scheduled to be of the vacuum pump tomorrow.  He reports no bleeding no fever no sweating no chills.    Patient reports she has been tolerating chemotherapy with FOLFIRI regimen, she has mild numbing involving her fingers, but not having skin erythematous changes or skin peeling on her hands or feet.     She reports no nausea no vomiting.  She has an ileostomy bag in place, she  has intermittent liquid in the pasty type stools, no significant increase in quantity of liquid stool.      Patient remains performance status ECOG 1.  She continues to work part-time in office, and part-time at home.  She does have visiting nurse come to the home for wound care.    Patient was evaluated yesterday at the behavioral oncology by Mrs. Krissy CHUNG for depression.  She was started mirtazapine 15 mg nightly.    Laboratory study today on 11/10/2020 reported trending down of WBC in the neutrophils, she is now leukocytopenia with WBC 3650 however still normal but decreased ANC 2370.  She also has trending down platelets 205,000.  Hemoglobin is 13.3 relatively stable.    We will proceed ahead with cycle #3 palliative chemotherapy FOLFIRI today.     Past Medical History:   Diagnosis Date   • Abnormal Pap smear of cervix 02/02/1998    JULIUS I   • Anemia in pregnancy    • Anxiety    • Bilateral epiphora 04/2020   • Bilateral hand swelling 05/02/2020    SEEN AT [facility]   • Cervical lymphadenitis 06/06/2012    SEEN AT [facility]   • Chemotherapy induced neutropenia (CMS/HCC) 04/2020   • Chemotherapy-induced nausea 02/2020   • Chemotherapy-induced thrombocytopenia 05/2020   • Chronic diarrhea    • Colon polyps     FOLLOWED BY DR. GERONIMO ESPARZA   • Cystitis 04/24/2020    WITH DEHYDRATION, ADMITTED TO [facility]   • Drug-induced peripheral neuropathy (CMS/HCC) 05/2020   • Fistula of intestine    • GERD (gastroesophageal reflux disease)    • Hand foot syndrome 05/2020   • Heart murmur     IN CHILDHOOD   • Hematochezia    • Hemorrhoids    • Heterozygous factor V Leiden mutation (CMS/HCC)     DX 8-2-2019   • History of anemia    • History of chemotherapy 2019    FOLLOWED BY DR. ALEXANDRU HUNT   • History of gestational diabetes    • History of pre-eclampsia    • History of radiation therapy 2019    FOLLOWED BY DR. JAVON LEWIS   • History of TB skin testing 01/17/2009    TB Skin Test   • HPV in female 1998   • Hypokalemia 09/2019    • Hypomagnesemia 2019   • IBS (irritable bowel syndrome)    • Ileostomy in place (CMS/[facility])     FOLLOWED BY DR. PADMAJA ESPARZA   • Infected insect bite of neck 2016    SEEN AT [facility]   • Kidney stones 2007    SEEN AT Located [facility]   • Lump of right breast     SWOLLEN LYMPH NODE   • Monopolar depression (CMS/[facility])    • On anticoagulant therapy    • PIH (pregnancy induced hypertension)    • Pulmonary embolism without acute cor pulmonale (CMS/[facility]) 09/20/2019    X 3; ADMITTED TO Located [facility]   • Pulmonary nodule, right 2020   • Rectal cancer (CMS/[facility]) 2019    STAGE IIA, INVASIVE MODERATELY DIFFERENTIATED ADENOCARCINOMA, CHEMO AND XRT FINISHED 2019   • Right ureteral stone 10/01/2019    SEEN AT Located [facility]   • SAB (spontaneous ) 1996     A2-1 INDUCED   • Sciatica    • Sepsis due to cellulitis (CMS/[facility]) 2002    LEFT GREAT TOE, ADMITTED TO Located [facility]   • Tachycardia 2020   • Urinary urgency 2020     Past Surgical History:   Procedure Laterality Date   • CHOLECYSTECTOMY N/A 10/10/2003    LAPAROSCOPIC WITH CHOLANGIOGRAM, DR. JAMEY TALAVERA AT Located [facility]   • COLON RESECTION N/A 2019    Procedure: laparoscopic low anterior colon resection with mobilization of splenic flexure and diverting loop ileostomy: ERAS;  Surgeon: Padmaja Esparza MD;  Location: [facility];  Service: General   • COLON RESECTION N/A 8/3/2020    Procedure: LOW ANTERIOR COLON RESECTION, COLOANAL ANASTOMOSIS, MOBILIZATION SPLENIC FLEXURE;  Surgeon: Padmaja Esparza MD;  Location: [facility];  Service: General;  Laterality: N/A;   • COLONOSCOPY N/A 7/15/2020    PATENT ANASTAMOSIS IN MID RECTUM, RESCOPE IN 1 YR, DR. PADMAJA ESPARZA AT Loc[facility]   • COLONOSCOPY W/ POLYPECTOMY N/A 2019    15 MM TUBULOVILLOUS ADENOMA POLYP IN HEPATIC FLEXURE, 20 MMTUBULOVILLOUS ADENOMA WITH HIGH GRADE DYSPLASIA IN RECTOSIGMOID, 4 CM MASS IN MID RECTUM, PATH: INVASIVE MODERATELY DIFFERENTIATED ADENOCARCINOMA, DR. JENNIFER LI AT [facility]   • DILATATION AND EVACUATION N/A 2009   • ENDOSCOPY N/A 07/08/2019    LA GRADE A ESOPHAGITIS, GASTRITIS, ALL BIOPSIES BENIGN, DR. ROLAND LI AT [facility]   • INCISION AND DRAINAGE PERIRECTAL ABSCESS N/A 8/14/2020    Procedure: INCISION AND DRAINAGE OF retrorectal dehiscence abcess with drain placement and irrigation;  Surgeon: Geronimo Ye MD;  Location: [facility] MA[facility] OR;  Service: General;  Laterality: N/A;   • PAP SMEAR N/A 01/24/2014   • SIGMOIDOSCOPY N/A 7/24/2019    INFILTRATIVE PARTIALLY OBSTRUCTING LARGE RECTAL CANCER, AREA TATOOED, DR. GERONIMO YE AT [facility]   • SIGMOIDOSCOPY N/A 11/23/2019    AN ULCERATED PARTIALLY OBSTRUCTING MASS IN MID RECTUM, AREA TATTOOED, DR. GERONIMO YE AT [facility]   • TRANSRECTAL ULTRASOUND N/A 7/24/2019    Procedure: ULTRASOUND TRANSRECTAL;  Surgeon: Geroinmo Ye MD;  Location: [facility] ENDOSCOPY;  Service: General   • URETEROSCOPY LASER LITHOTRIPSY WITH STENT INSERTION Right 10/30/2019    DR. ESTUARDO BEASLEY AT [place]   • VAGINAL DELIVERY N/A 09/18/1998   • VENOUS ACCESS DEVICE (PORT) INSERTION Left 8/9/2019    Procedure: INSERTION VENOUS ACCESS DEVICE;  Surgeon: Sj Joseph MD;  Location: [facility] OR [facility];  Service: General   • WISDOM TOOTH EXTRACTION Bilateral 1993       HEMATOLOGIC/ONCOLOGIC HISTORY:      The patient reports she has intermittent rectal bleeding and urgency, mucous with her stool, starting sometime in 2016. At that time she was referred to GI service, and was diagnosed of irritable bowel syndrome. Nevertheless she had worsening urgency for bowel movement, and had a couple of incidents losing control of stool. She was recently seen by Roland Li MD again and had colonoscopy and EGD exam on 07/08/2019. She was found having a circumferential rectal mass. According to the procedure note, the patient had a fungating circumferential bleeding 4 cm mass in the middle rectum, at distance between 13 cm and 17 cm from the anus. Mass was  causing partial obstruction. There were also colon polyps found at the hepatic flexure and also at the rectosigmoid junction 23 cm from the anus. Both were resected and retrieved. EGD examination reported grade A esophagitis at the GE junction and patchy discontinuous erythema and aggravation of the mucosa without bleeding in the stomach body. There is normal mucosa of the duodenum. Pathology evaluation from this procedure reported moderately differentiated adenocarcinoma involving the rectal mass. The rectal sigmoid junction polyp was tubular/tubulovillous adenoma with high grade dysplasia negative for invasive malignancy. The hepatic flexure polyp was also tubular/tubulovillous adenoma negative for high grade dysplasia or malignancy. The biopsy from the esophagus reports squamocolumnar mucosa with inflammatory changes suggestive of mild reflux esophagitis, negative for interstitial metaplasia. Gastric biopsy was benign and duodenal biopsy was also benign. There is no mention of H-pylori.     Patient was subsequently referred to colorectal surgeon Padmaja Ye MD for further evaluation. The patient had CT scan examination for chest, abdomen and pelvis requested by Dr. Ye and were done on 07/13/2019. The study reported no evidence of lymphadenopathy in the abdomen and pelvis. There was wall thickening of the rectosigmoid junction. Normal spleen, pancreas, adrenal glands and kidneys. There was fatty liver infiltration but no focal lymphatic lesions. There was a small 6-7 mm noncalcified nodule in the right upper lobe and a tiny 3 mm subpleural nodule in the right middle lobe. No mediastinal or hilar lymphadenopathy.     Dr. Ye performed staging rectal ultrasound examination. This study reported tumor penetrating through the muscularis propria as T3 disease; there were no lymph nodes identified.    She had a hospitalization in late September 2019 because of newly discovered pulmonary emboli.  She was on  prophylactic Lovenox prior to the incident of PE.  Now she is on full dose Xarelto anticoagulation.  Patient reports no further chest pain dyspnea.  She denies bleeding or bruising.  During her hospitalization, she was seen by Dr. Ye, who plans to have surgery 8 to 12 weeks after finishing radiation therapy.  She finished her radiation on 9/19/2019.     I noticed patient also presented to the emergency room on 10/1/2019 complaining of right flank area pain.  I reviewed the images studies and indeed she has a very small 1 to 2 mm obstructing kidney stone in the UV junction.  Patient is still symptomatic with some pains and dysuria.  She denies fever sweating or chills.    Laboratory study on 10/7/2019 reported normal CBC including hemoglobin 13.1, platelets 301,000, WBC 6170 and ANC 4900 lymphocytes 590 monocytes 480.      The nurse reported malfunction of port-a-catheter on 10/7/2019.  Port study on 10/8/2019 showed fibrin sheath around the distal aspect of the Mediport catheter in the SVC. This does not appear to hinder injection, but does prevent aspiration at this time.    Patient underwent surgical resection of the colon on 12/2/2019 with Dr. Ye.  Pathology evaluation reported residual T3 disease, also 1 out of 14 lymph nodes positive for malignancy.  So this patient in either had at least stage IIIb disease (T3 N1 M0?).      Adjuvant chemotherapy FOLFOX 6 will be started on 1/22/2020.  Laboratory study reported iron saturation 10%, free iron 31 TIBC 319 and ferritin 168.  Her hemoglobin was 11.8, WBC 5600, and platelets 347,000.    Patient was here on 02/12/2020 for cycle 2 of her FOLFOX.  This is delayed x1 week secondary to neutropenia.  The patient ultimately received 3 days worth of Neupogen with recovery of her blood counts.  Of note, the patient struggled with significant nausea without any episodes of vomiting following cycle #1 of chemotherapy resulting in significant weight loss.  She is up 12  pounds over the last week as her appetite has normalized.  We will add Emend to her care plan.    She is having several loose stools per her colostomy and has been hesitant to take Imodium due to prior history of constipation.  I encouraged her to try this with a maximum of 8 tablets/day.  She denies any infectious symptoms including fevers or chills.  No excess bleeding or bruising noted.  She had the expected cold sensitivity related to the Oxaliplatin for about 3 days following treatment.    Labs from 02/12/2020 demonstrated total white blood cell count of 5.14, ANC of 3.06, hemoglobin of 11.2, platelets of 211,000.  She was found to be iron deficient last week and is intolerant to oral iron secondary to GI distress.  For this reason, she initiated IV iron therapy with Injectafer last week.  She had received her second dose 02/12/2020    Patient has normalized hemoglobin 12.2 on 2/26/2020.    She reported on 5/5/2020 she had a recent visit to the emergency department for what was suspected to be an allergic reaction.  She called our on-call service on Saturday with reports of hand and face swelling.  She was instructed to proceed to the emergency department at which time she was assessed and prescribed a Medrol Dosepak.  She had just completed her 5-FU and was unhooked on Friday, 5/3/2020.  Her symptoms have improved.  She does report persistent hyperpigmentation and mild swelling of the palms of the hands but this is much improved.  It was her right hand specifically that was swollen.  Her facial swelling has resolved.  She continues on Cefdinir nd since has 1 day remaining, she was prescribed cefdinir for a UTI requiring hospitalization at the end of April.  Reports no new symptoms.  Her labs are stable.  She is scheduled for treatment again.    Patient states at this time she is not tolerating her chemotherapy well.     Patient seen in the emergency department on 5/2/2020 for what was suspected to be an  allergic reaction.  She called our on-call service on Saturday explaining that she was experiencing hand and face swelling.  She was instructed to proceed to the emergency department at which time she was assessed and prescribed a Medrol Dosepak.  She had just completed her 5-FU and was unhooked on Friday, 5/1/2020.      She reports since her ED visit on 5/2/2020 her symptoms have not improved. Her hands and feet were swollen upon presenting to the ED and she could barely make a fist. She states that she feels so swollen she is not able to stand it. She states that her skin on the hands and feet are peeling extensively as well, besides changing color to more dark.     She also reports significant fatigue after her first week of the 5-FU treatment but she expected this side effect. She also notices significant increase in her neuropathy to the point that she is not able to even walk around in her home without her house slippers due to irritation from her rugs. She denies and associated nausea or vomiting at this time. She also has episodes of epistaxis every day after the chemotherapy cycle #7.  She does report working full-time during the week of chemotherapy.    Laboratory studies, 5/13/2020, show moderate thrombocytopenia platelets 123,000, low normal WBC 4140 including ANC 2720 and normal hemoglobin 13.4.  Because significant hand-foot syndrome, will decrease both 5-FU and oxaliplatin by 50%, and eliminate bolus dose of 5-FU.    On 5/21/2020 patient had a PET scan performed which showed no convincing evidence of residual disease in abdomen or pelvis, no metastatic disease within the chest or neck.     Cycle #8 FOLFOX 6 was given on 5/13/2020, with 50% dose reduction for both 5-FU and oxaliplatin, and also examination of bolus 5-FU.  She states on 5/27/2020 that with the recent reduction of the chemotherapy she feels significantly better. She has more energy and is able to do her daily routine.      PET scan  examination on 5/21/2020 reported no evidence of metastatic disease in chest abdomen pelvis.  There was a stable 6 mm right upper lobe pulmonary nodule.    Laboratory studies on 5/27/2020 showed mild leukocytopenia WBC 3720 but a normal ANC 2250 and lymphocytes 630.  Normal platelets 163,000 and hemoglobin 12.6.  Chemistry lab reported normal renal function, liver function panel and a electrolytes, elevated glucose 164.    Laboratory studies 6/24/2020, showed normal hemoglobin 13.4 but macrocytosis .9.  Normal platelets 210,000 and WBC 5870.  She had normal CMP.     Patient last time was here in late June 2020.  Since that time she had surgical procedure for low anterior colon resection, coloanal anastomosis on 8/3/2020.  She later developed a perirectal abscess, required surgical drainage on 8/14/2020.  She was discharged on 8/27/2020.    Patient reports to me she still has lower abdominal wall vacuum suction in place.  She denies fever sweating chills.  Performance status is ECOG 1.  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  She does have visiting nurse come to the home for wound care.    CT scan for chest abdomen pelvis on 9/9/2020 reported a new 8 mm noncalcified pulmonary nodule in the left lower lobe.  Otherwise stable right upper lobe 6 mm pulmonary nodule, and no evidence of disease recurrence in the abdomen or pelvis.     Laboratory study on 9/16/2020 reported elevated AST 55, ALT 95, alk phosphatase 143 but normal total bilirubin 0.3.  Chemistry lab otherwise unremarkable.  She has completed normal CBC.      Because of the abnormal CT scan examination for chest abdomen pelvis on 9/9/2020 reported a new 8 mm noncalcified pulmonary nodule in the left lower lobe, we obtained a PET scan examination for further evaluation, which was done on 9/18/2020.  This study reported several pulmonary nodules, some of them are hypermetabolic, newly developed compared to previous PET  scan in May 2020.  Certainly does highly suspicious for metastatic disease.  There are also hypermetabolic activity in the abdomen and pelvic area which is hard to differentiate from surgical scar versus metastatic malignancy.    Laboratory study on 10/13/2020 reported normal CBC with Hb 13.9, platelets 302,000 and WBC 5520 including ANC 3830.  Chemistry lab reported normalized AST 20, alk phosphatase 116 improved ALT 35, and maintains normal bilirubin, with normal electrolytes and liver function panel.     Patient was started on first cycle of palliative chemotherapy FOLFIRI on 10/13/2020.    MEDICATIONS    Current Outpatient Medications:   •  clonazePAM (KlonoPIN) 1 MG tablet, Take 1 tablet by mouth 3 (Three) Times a Day As Needed for Anxiety or Seizures., Disp: 90 tablet, Rfl: 0  •  dicyclomine (BENTYL) 20 MG tablet, TAKE 1 TABLET BY MOUTH EVERY 6 HOURS AS NEEDED, Disp: 120 tablet, Rfl: 2  •  diphenoxylate-atropine (LOMOTIL) 2.5-0.025 MG per tablet, Take 1 tablet by mouth 4 (Four) Times a Day As Needed for Diarrhea., Disp: 120 tablet, Rfl: 1  •  famotidine (PEPCID) 20 MG tablet, TAKE 1 TABLET BY MOUTH TWICE A DAY (Patient taking differently: Take 20 mg by mouth Every Evening.), Disp: 180 tablet, Rfl: 1  •  HYDROcodone-acetaminophen (NORCO) 7.5-325 MG per tablet, Take 1 tablet by mouth Every 6 (Six) Hours As Needed for Moderate Pain ., Disp: 240 tablet, Rfl: 0  •  Loratadine (CLARITIN) 10 MG capsule, Take 10 mg by mouth Every Evening., Disp: , Rfl:   •  mirtazapine (REMERON SOL-TAB) 15 MG disintegrating tablet, Place 1 tablet on the tongue Every Night., Disp: 30 tablet, Rfl: 2  •  ondansetron (ZOFRAN) 8 MG tablet, Take 1 tablet by mouth 3 (Three) Times a Day As Needed for Nausea or Vomiting., Disp: 60 tablet, Rfl: 5  •  prochlorperazine (COMPAZINE) 10 MG tablet, Take 1 tablet by mouth Every 6 (Six) Hours As Needed for Nausea or Vomiting., Disp: 30 tablet, Rfl: 2  •  rivaroxaban (Xarelto) 20 MG tablet, Take 1  tablet by mouth Daily., Disp: 90 tablet, Rfl: 3  No current facility-administered medications for this visit.     Facility-Administered Medications Ordered in Other Visits:   •  atropine injection 0.25 mg, 0.25 mg, Intravenous, Q15 Min PRN, Dong Nguyen MD PhD, 0.25 mg at 11/10/20 0947  •  fluorouracil (ADRUCIL) 4,850 mg, sodium chloride 0.9 % 143 mL chemo infusion - FOR HOME USE, 2,400 mg/m2 (Treatment Plan Recorded), Intravenous, Once, Dong Nguyen MD PhD, 4,850 mg at 11/10/20 1135  •  heparin injection 500 Units, 500 Units, Intravenous, PRN, Dong Nguyen MD PhD    ALLERGIES:   No Known Allergies    SOCIAL HISTORY:       Social History     Tobacco Use   • Smoking status: Heavy Tobacco Smoker     Packs/day: 1.00     Years: 24.00     Pack years: 24.00     Types: Electronic Cigarette, Cigarettes   • Smokeless tobacco: Never Used   • Tobacco comment: LAST CIGARETTE 8/12/2020   Substance Use Topics   • Alcohol use: Not Currently   • Drug use: No         FAMILY HISTORY:   Mother has positive factor V Leiden mutation, although she did not have thrombosis, mother also is coronary disease with stenting, she also is occasional bruising.  Maternal grandmother had DVT, she had multiple surgical procedures.  Patient mother's half-brother had metastatic colon cancer at diagnosis in his 50s.  Maternal great aunt has breast cancer.  Patient will follow his skin cancer in his 60s, exclusive.    REVIEW OF SYSTEMS:  Review of Systems   Constitutional: Negative for activity change, appetite change, chills, diaphoresis, fatigue, fever and unexpected weight change.   HENT: Negative for congestion, hearing loss, mouth sores, nosebleeds and trouble swallowing.    Eyes: Negative for photophobia and visual disturbance.   Respiratory: Negative for cough, chest tightness and shortness of breath.    Cardiovascular: Negative for chest pain, palpitations and leg swelling.   Gastrointestinal: Negative for abdominal distention,  abdominal pain, anal bleeding, constipation, diarrhea and nausea.   Endocrine: Negative for cold intolerance and heat intolerance.   Genitourinary: Negative for dysuria and hematuria.   Musculoskeletal: Negative for arthralgias, back pain and joint swelling.   Skin: Positive for wound (Low abdomen wound with vacuum suction in place). Negative for color change and rash.   Allergic/Immunologic: Positive for immunocompromised state (Secondary to adjuvant chemotherapy). Negative for environmental allergies and food allergies.   Neurological: Negative for dizziness, numbness and headaches.   Hematological: Negative for adenopathy. Does not bruise/bleed easily.   Psychiatric/Behavioral: Negative for agitation, behavioral problems, confusion and suicidal ideas.   No change of review of system on 11/10/2020.           Vitals:    11/10/20 0806   BP: 114/83   Pulse: 97   Resp: 16   Temp: 97.8 °F (36.6 °C)   SpO2: 96%   Weight: 97.2 kg (214 lb 4.8 oz)   PainSc: 0-No pain     Current Status 10/27/2020   ECOG score 0     PHYSICAL EXAM:      CONSTITUTIONAL:  Well-developed, well-nourished female, no distress, looks comfortable.  EYES:  Conjunctiva and lids unremarkable.  Pupils equal and round.  EARS:   Ears appear unremarkable.    NOSE:  Patient wears mask due to the pandemic coronavirus infection.   MOUTH: Same as above.  THROAT: Same as above.  RESPIRATORY:  Normal respiratory effort.  Lungs clear to auscultation bilaterally.  CARDIOVASCULAR: Regular rhythm and rate.  Normal S1, S2.  No murmurs.   GASTROINTESTINAL: Abdomen no tender.  Ileostomy in the right lower abdomen.  Bowel sounds normal.  Nontender.  Vacuum suction of the wound in place.  LYMPHATIC:  No cervical, supraclavicular lymphadenopathy.  MUSCULOSKELETAL:  Unremarkable gait and station.  No cyanosis or clubbing.  No lower extremity edema.   SKIN:  Warm.  No rashes.  Lower abdomen wound has vacuum suction in place, it is smaller.  No evidence of infection.    PSYCHIATRIC:  Normal judgment and insight.      RECENT LABS:    Lab Results   Component Value Date    WBC 3.65 11/10/2020    HGB 13.3 11/10/2020    HCT 41.8 11/10/2020    MCV 94.4 11/10/2020     11/10/2020     Lab Results   Component Value Date    NEUTROABS 2.37 11/10/2020     Glucose   Date Value Ref Range Status   11/10/2020 126 (H) 74 - 124 mg/dL Final     BUN   Date Value Ref Range Status   11/10/2020 11 6 - 20 mg/dL Final     Creatinine   Date Value Ref Range Status   11/10/2020 0.85 0.60 - 1.10 mg/dL Final   09/09/2020 0.60 0.60 - 1.30 mg/dL Final     Comment:     Serial Number: [id number]:  [id number]     Sodium   Date Value Ref Range Status   11/10/2020 137 134 - 145 mmol/L Final     Potassium   Date Value Ref Range Status   11/10/2020 4.0 3.5 - 4.7 mmol/L Final     Chloride   Date Value Ref Range Status   11/10/2020 104 98 - 107 mmol/L Final     CO2   Date Value Ref Range Status   11/10/2020 22.7 22.0 - 29.0 mmol/L Final     Calcium   Date Value Ref Range Status   11/10/2020 9.1 8.5 - 10.2 mg/dL Final     Total Protein   Date Value Ref Range Status   11/10/2020 6.8 6.3 - 8.0 g/dL Final     Albumin   Date Value Ref Range Status   11/10/2020 3.60 3.50 - 5.20 g/dL Final     ALT (SGPT)   Date Value Ref Range Status   11/10/2020 26 0 - 33 U/L Final     AST (SGOT)   Date Value Ref Range Status   11/10/2020 17 0 - 32 U/L Final     Alkaline Phosphatase   Date Value Ref Range Status   11/10/2020 113 38 - 116 U/L Final     Total Bilirubin   Date Value Ref Range Status   11/10/2020 0.2 0.2 - 1.2 mg/dL Final     eGFR Non  Amer   Date Value Ref Range Status   11/10/2020 74 >60 mL/min/1.73 Final     BUN/Creatinine Ratio   Date Value Ref Range Status   11/10/2020 12.9 7.3 - 30.0 Final     Anion Gap   Date Value Ref Range Status   11/10/2020 10.3 5.0 - 15.0 mmol/L Final     Lab Results   Component Value Date    CEA 1.32 09/16/2020         Assessment/Plan      ASSESSMENT:   1.  Rectal cancer, rectal  ultrasound examination reported T3N0 disease without lymphadenopathy.   · CT scan of chest, abdomen and pelvis reported no lymphadenopathy in the abdomen, pelvis or chest. She does have small pulmonary nodule 6-7 mm and 2 mm with indeterminate feature. There is no solid evidence of metastatic disease otherwise.   · Based on the CT scan and rectal ultrasound imaging studies, this patient had stage IIA (T3N0M0) disease.    · She had PET scan examination on 8/8/2019 which reported a hypermetabolic right upper lobe pulmonary nodule 6 mm with SUV 5.6.  This is suspicious for metastatic disease however it is too small to be biopsied.  This patient may have stage IV disease.   · She initiated concurrent radiation with continuous 5-FU on 8/12/2019.  · Patient finished concurrent chemoradiation therapy.  · Patient underwent surgical resection of the rectal tumor and diverting loop ileostomy on 12/2/2019 with Dr. Ye.  Pathology evaluation reported residual T3 disease, with 1 out of 14 lymph nodes positive for malignancy.  Certainly this patient has at least stage IIIb rectal cancer (T3 N1 M0?)  · On 1/22/2020 adjuvant chemotherapy FOLFOX 6 regimen initiated.    · On 2/5/2022 cycle #2 was delayed secondary to neutropenia.  She was given 3 days of Granix.   · Emend added with cycle 3.  With improved nausea control  · Continuing home Granix x3 days following 5-FU disconnect  · 3/11/2020 due for cycle 4, however, she is experiencing progressive abdominal pain and occasional fevers.   · CT scan performed on 3/13/2020 reveals no evidence of progressive or recurrent disease.  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  · Patient hospitalized 4/24-4/26/20 after cycle 5 of chemotherapy with acute UTI.  CT abdomen/pelvis noting fluid collection in the presacral region having diminished in size compared to CT dated 3/13/2020.  Patient was evaluated by Dr. Ye while in the hospital with further plans to evaluate possible colovaginal  fistula following completion of chemotherapy.  Patient did respond to IV antibiotics and discharged home on oral cefdinir.  · 4/29/2020 cycle 6 of FOLFOX.  Urinary symptoms have resolved   · Patient seen in the [facility] ED on 5/2/2020 for what was suspected to be an allergic reaction.  She called our service on Saturday explaining that she was experiencing hand and face swelling.  She was instructed to proceed to the emergency department at which time she was assessed and prescribed a Medrol Dosepak.  She had just completed her 5-FU and was unhooked on Friday, 5/1/2020.  Her symptoms have resolved in the office on assessment, 5/5/2020.  · The patient had grade 3 hand-foot syndrome based on symptomology.  Patient had cycle #8 of 5-FU on 5/13/2020. Due to her symptoms and poor tolerance to the 5-FU, her treatment dose will be reduced 50% for oxaliplatin and infusional 5-FU.  Bolus 5-FU will be discontinued..  · On 5/13/2020  We discussed further treatment options pending the scan results.  If she has indeed residual disease or metastatic disease, we will switch her to irinotecan plus Avastin or anti-EGFR monoclonal antibody.  She will need a further molecular testing of her tumor samples in that case.  · On 5/21/2020 patient had a PET scan and it showed no evidence of of metastatic disease in the neck, chest, abdomen or pelvis.  There was fluid accumulation/abscess.   · On 5/27/2020 I discussed with the patient that we can discontinue chemotherapy at this time.  She will follow-up with Dr. Ye to discuss a possibility to reverse the ileostomy.  We likely will obtain anther PET scan in 3 to 4 months for reassessment.    · Patient was seen by Dr. Ye on 6/19/2019 for evaluation and to discuss possible take down of her ileostomy.  She is scheduled to have a gastrografin enema on 7/2/2020 to evaluate for a fistula, and then a colonoscopy on 7/15/2020, both done by Dr. Ye.  She states that based on the above  imaging and procedure findings, she may have a more extensive surgery done or just have her ileostomy reversed, which would likely be done in August 2020.  This will all be coordinated under the care of Dr. Ye.     · I reviewed scan results with the patient on 9/16/2020 for the most recent CT scan from 09/09/2020 and also compared it to her previous PET scan examination from 05/21/2020 and also the original PET scan from 08/09/2019.  The original PET scan there is a small right upper lobe pulmonary nodule 6 mm with SUV 5.6. So in the 05/2020 PET scan the nodule is still there but activity seems much less and this most recent CT scan examination also documented the preexisting small pulmonary nodule however there is a new left lower lobe pulmonary nodule 8 mm in size and I shared with the patient today this nodule is suspicious for metastatic disease. Laboratory studies reported minimal CEA level 1.32 on 09/09/2020. Liver function panel was only marginally abnormal with the ALT 45, the remaining is normal. However laboratory study today showed worsening AST 55, ALT 95 and alkaline phosphatase 143 but is still normal, total bilirubin 0.3.   · So I discussed with the patient on 9/16/2020 recommended to have repeat PET scan examination for assessment because the left lower lobe pulmonary nodule is too small to be biopsied, and if the PET scan reports hypermetabolic lesion, I recommended to have stereotactic radiation therapy. Explained to patient that she is not a really good candidate to have thoracotomy for resection because she still has unhealed wound in the abdomen. I think the stereotactic radiation therapy will be a more feasible choice.  · Laboratory study on 9/16/2020 reported worsening liver function panel with both elevated AST, ALT and also slightly worsened alkaline phosphatase despite having normal total bilirubin. I recommended to repeat laboratory study for reevaluation. The only new medication she  has in the past several days is Augmentin but otherwise no change of condition. She denies any recent viral infection. We will monitor this.   · PET scan examination obtained on 9/18/2020 showed metastatic disease.  It reported a few hypermetabolic pulmonary nodules, and some new small pulmonary nodules, all of them highly suspicious for metastatic disease.  She also has hypermetabolic activity in the abdomen and pelvic area which could be related to scar tissue from her recent surgery versus metastatic disease.  Nevertheless overall picture fits with metastatic cancer.  · I discussed with the patient on 9/20/2020, we reviewed the PET scan images together, and I recommended to have systematic chemotherapy, I do not think stereotactic reading therapy to the hypermetabolic lesions in the lungs warranted at this time because even those will be treated with reading therapy, she will still need systematic chemotherapy.  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  I did discuss with the patient using anti-EGFR monoclonal antibody versus anti-VEGF monoclonal antibody.     · Palliative chemotherapy cycle#1 FOLFIRI was started on 10/13/2020.  No bolus 5-FU given considering previous poor tolerance.  Genetic studies still pending.   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  Microsatellite stable, mutation burden 5/Mb.    · I discussed with the patient 10/27/2020, because of the K-s[place]ia mutation, she is not a candidate for anti-EGFR monoclonal antibody such as Erbitux or vectibix.  She could be a candidate for anti-VEGF monoclonal antibody, however because of active wound, she is not a candidate right now at this moment.  · Cycle #2 palliative FOLFIRI will be given on 10/27/2020.   · Cycle #3 FOLFIRI will be given 11/10/2020.  I discussed with the patient, recommended to have PET scan after cycle #6 chemotherapy for reassessment.    2 .  Pulmonary nodule,  hypermetabolic on PET scan.   · Her original PET scan examination from 8/8/2019 reported a small 8 mm right upper apex pulmonary nodule but hypermetabolic SUV 5.1.  That was too small to be biopsied, however there was always a possibility of metastatic disease considering that high activity despite a such a small nodule.  · Her chest CT scan examination from 3/13/2020 reported this shrank to 6 mm.    · PET scan examination on 5/21/2020 reported no metabolic activity at this residual nodule.  This needs to be monitored.    · PET scan examination 9/18/2020 reported couple of hypermetabolic pulmonary nodules, besides a few extra small pulmonary nodules.  Those are highly suspicious for metastatic disease.      3.   Pulmonary emboli with background of positive factor V Leiden mutation   · The patient has heterozygous factor V Leiden mutation and therefore was on low-dose anticoagulation with Lovenox, 40 mg daily given her increased risk of thrombosis with [facility] and GI malignancy.    · Nevertheless this patient was found to having pulmonary emboli on 9/20/2019 despite low-dose Lovenox.  She had a brief hospitalization in late September 2019, she was started on full dose Xarelto anticoagulation per protocol.    · The patient continues on Xarelto and is tolerating this well.  She is having some issues with slight nosebleeds as outlined above.   · No issues reported on 10/13/2020.  Patient will continue Xarelto for now.    · Continue anticoagulation 11/10/2020.  Patient reports no bleeding.    4.  This patient also has strong family history for malignancy the patient had appointment with genetic counseling on September 3, 2019.  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    5.  Mild anemia, improved since her surgery.    · She also has a history of iron deficiency.  Iron studies reveal a saturation of just 10%.  She was started on oral iron but was unable to tolerate due to GI side effects.   · Status post Injectafer  2/5/2020 and 2/12/2020   · Hemoglobin was within normal limits on 4/1/2020 at 13.1  · Hemoglobin 13.4 on 5/2/2020  · Hemoglobin is 13.4 on 5/13/2020  · Hemoglobin stable at 12.6 on 5/27/2020.  · Hemoglobin stable at 13.4 on 6/24/2020.  · Hemoglobin 10.7 on 8/12/2020, postop.    · Patient has improved and normalized hemoglobin 15.4 on 9/16/2020.   · Hb 13.9 on 10/13/2020.  We started first cycle of FOLFIRI chemotherapy.  · Hemoglobin 13.4 on 10/27/2020.    · Hemoglobin 13.3 11/10/2020.  We will continue to monitor.    6.  Peripheral neuropathy secondary to chemotherapy.    · This is mild involving bilateral hands and feet as reported on 9/16/2020.   · Will avoid chemotherapy with oxaliplatin.  · Stable mild neuropathy as of 11/10/2020    7.  History of hand-foot syndrome grade 3.    · It become so significant after cycle #7 FOLFOX treatment.  Discussed with patient on 5/13/2020, will discontinue the bolus 5-FU dose, and also decrease 50% of the infusion dose through the pump.   · We also use Medrol Dosepak for possible recurrent symptomology.  She responded this well.   · Her hand-foot syndrome improved.  · For the FOLFIRI regimen starting October 2020, will not give bolus 5-FU, but will give full dose of infusion 5-FU as calculated.    8.  Hyperlacrimation and mild scleral irritation related to 5-FU.  She has been taking steroid eyedrops.  This has improved her hyperlacrimation.  · Not a current issue.  However expecting this will recur once she has been started back on 5-FU treatment.    9.  Abnormal liver function panel.  · Improved liver function panel 9/18/2020.  This is probably related to her recent infection.  · She has normalized AST, alk phosphatase, and a much improved only marginally elevated ALT 35 on 10/13/2020.    · Normalized liver function panel on 10/27/2020.    · Normal liver panel on 11/10/2020.  Continue to monitor.    10.  Newly developed leukocytopenia on 11/10/2020.  This is clearly secondary  to chemotherapy.   · Patient has decreased but still normal ANC 2370 on 11/10/2020.  We will continue to monitor this.  Discussed with the patient, we may consider adding G-CSF if she becomes neutropenic with ongoing chemotherapy.     11.  Depression.  Patient was seen by JULIET Sylvester on 11/9/2020.  She was started on mirtazapine.  Patient will continue follow-up with the behavioral oncology service.      PLAN:  1. Proceed ahead with cycle #3 FOLFIRI treatment today.  To be aware, there would be no bolus dose of 5-FU due to previous hand-foot syndrome.    2. Patient is positive for K-s[place]ia mutation by comprehensive NGS from [facility] laboratory.  Not a candidate for anti-EGFR monoclonal antibody.  Potential candidate for anti-VEGF monoclonal antibody, but due to active wound, will delay that.  3. Continue Xarelto 20 mg daily.   4. Continue wound care.   5. Patient will return in 2 weeks to see NP for reevaluation, with lab studies prior to cycle #4 FOLFIRI chemotherapy.   6. I will see patient in 4 weeks for reevaluation, with routine labs, prior to cycle #5 FOLFIRI chemotherapy.  Plan to have PET scan after cycle #6 treatment.  7. I have asked the patient to call the office should she experience new or worsening symptoms.       This patient is on high risk medication and needs close monitoring.       ALEXANDRU HUNT M.D., Ph.D.    11/10/2020      CC:  WAYNE Worley MD Carrie B. Scharf, MD

## 2020-11-10 NOTE — NURSING NOTE
No blood return from port.  History of no blood return.  Dye study x 2 in the past.  Both dye studies show adequate flow.  Pt with complaint of right shoulder pain during Irinotecan.  Refused for medication to be stopped.  Dr. Nguyen notified as she had the same pain with her last treatment.  Dr. Nguyen with no new orders.

## 2020-11-12 ENCOUNTER — INFUSION (OUTPATIENT)
Dept: ONCOLOGY | Facility: HOSPITAL | Age: 40
End: 2020-11-12

## 2020-11-12 VITALS — HEART RATE: 94 BPM | SYSTOLIC BLOOD PRESSURE: 126 MMHG | TEMPERATURE: 97.9 F | DIASTOLIC BLOOD PRESSURE: 90 MMHG

## 2020-11-12 DIAGNOSIS — Z45.2 ENCOUNTER FOR FITTING AND ADJUSTMENT OF VASCULAR CATHETER: ICD-10-CM

## 2020-11-12 DIAGNOSIS — C20 ADENOCARCINOMA OF RECTUM (HCC): Primary | ICD-10-CM

## 2020-11-12 PROCEDURE — 25010000003 HEPARIN LOCK FLUSH PER 10 UNITS: Performed by: INTERNAL MEDICINE

## 2020-11-12 RX ORDER — SODIUM CHLORIDE 0.9 % (FLUSH) 0.9 %
10 SYRINGE (ML) INJECTION AS NEEDED
Status: CANCELLED | OUTPATIENT
Start: 2020-11-12

## 2020-11-12 RX ORDER — SODIUM CHLORIDE 0.9 % (FLUSH) 0.9 %
10 SYRINGE (ML) INJECTION AS NEEDED
Status: DISCONTINUED | OUTPATIENT
Start: 2020-11-12 | End: 2020-11-12 | Stop reason: HOSPADM

## 2020-11-12 RX ORDER — HEPARIN SODIUM (PORCINE) LOCK FLUSH IV SOLN 100 UNIT/ML 100 UNIT/ML
500 SOLUTION INTRAVENOUS AS NEEDED
Status: DISCONTINUED | OUTPATIENT
Start: 2020-11-12 | End: 2020-11-12 | Stop reason: HOSPADM

## 2020-11-12 RX ORDER — HEPARIN SODIUM (PORCINE) LOCK FLUSH IV SOLN 100 UNIT/ML 100 UNIT/ML
500 SOLUTION INTRAVENOUS AS NEEDED
Status: CANCELLED | OUTPATIENT
Start: 2020-11-12

## 2020-11-12 RX ADMIN — Medication 500 UNITS: at 08:31

## 2020-11-16 ENCOUNTER — HOSPITAL ENCOUNTER (EMERGENCY)
Facility: HOSPITAL | Age: 40
Discharge: HOME | End: 2020-11-16
Attending: EMERGENCY MEDICINE | Admitting: EMERGENCY MEDICINE

## 2020-11-16 ENCOUNTER — APPOINTMENT (OUTPATIENT)
Dept: CT IMAGING | Facility: HOSPITAL | Age: 40
End: 2020-11-16

## 2020-11-16 ENCOUNTER — OFFICE VISIT (OUTPATIENT)
Dept: SURGERY | Facility: CLINIC | Age: 40
End: 2020-11-16

## 2020-11-16 VITALS
HEART RATE: 71 BPM | RESPIRATION RATE: 18 BRPM | OXYGEN SATURATION: 95 % | TEMPERATURE: 96.9 F | SYSTOLIC BLOOD PRESSURE: 148 MMHG | HEIGHT: 66 IN | BODY MASS INDEX: 34.59 KG/M2 | DIASTOLIC BLOOD PRESSURE: 76 MMHG

## 2020-11-16 VITALS
TEMPERATURE: 97.7 F | WEIGHT: 218 LBS | DIASTOLIC BLOOD PRESSURE: 86 MMHG | HEIGHT: 66 IN | BODY MASS INDEX: 35.03 KG/M2 | OXYGEN SATURATION: 98 % | HEART RATE: 88 BPM | SYSTOLIC BLOOD PRESSURE: 136 MMHG

## 2020-11-16 DIAGNOSIS — Z51.11: ICD-10-CM

## 2020-11-16 DIAGNOSIS — C34.90 MALIGNANT NEOPLASM OF LUNG, UNSPECIFIED LATERALITY, UNSPECIFIED PART OF LUNG (HCC): ICD-10-CM

## 2020-11-16 DIAGNOSIS — L02.91 SOFT TISSUE ABSCESS: Primary | ICD-10-CM

## 2020-11-16 DIAGNOSIS — Z79.01 CHRONIC ANTICOAGULATION: ICD-10-CM

## 2020-11-16 DIAGNOSIS — M25.511 ACUTE PAIN OF RIGHT SHOULDER: Primary | ICD-10-CM

## 2020-11-16 LAB
ALBUMIN SERPL-MCNC: 3.7 G/DL (ref 3.5–5.2)
ALBUMIN/GLOB SERPL: 1.3 G/DL
ALP SERPL-CCNC: 115 U/L (ref 39–117)
ALT SERPL W P-5'-P-CCNC: 30 U/L (ref 1–33)
ANION GAP SERPL CALCULATED.3IONS-SCNC: 6.1 MMOL/L (ref 5–15)
AST SERPL-CCNC: 14 U/L (ref 1–32)
BASOPHILS # BLD AUTO: 0.01 10*3/MM3 (ref 0–0.2)
BASOPHILS NFR BLD AUTO: 0.3 % (ref 0–1.5)
BILIRUB SERPL-MCNC: <0.2 MG/DL (ref 0–1.2)
BUN SERPL-MCNC: 9 MG/DL (ref 6–20)
BUN/CREAT SERPL: 12.9 (ref 7–25)
CALCIUM SPEC-SCNC: 8.8 MG/DL (ref 8.6–10.5)
CHLORIDE SERPL-SCNC: 103 MMOL/L (ref 98–107)
CO2 SERPL-SCNC: 25.9 MMOL/L (ref 22–29)
CREAT SERPL-MCNC: 0.7 MG/DL (ref 0.57–1)
DEPRECATED RDW RBC AUTO: 45.9 FL (ref 37–54)
EOSINOPHIL # BLD AUTO: 0.14 10*3/MM3 (ref 0–0.4)
EOSINOPHIL NFR BLD AUTO: 4.7 % (ref 0.3–6.2)
ERYTHROCYTE [DISTWIDTH] IN BLOOD BY AUTOMATED COUNT: 14.7 % (ref 12.3–15.4)
GFR SERPL CREATININE-BSD FRML MDRD: 92 ML/MIN/1.73
GLOBULIN UR ELPH-MCNC: 2.9 GM/DL
GLUCOSE SERPL-MCNC: 96 MG/DL (ref 65–99)
HCT VFR BLD AUTO: 38 % (ref 34–46.6)
HGB BLD-MCNC: 12.8 G/DL (ref 12–15.9)
IMM GRANULOCYTES # BLD AUTO: 0.01 10*3/MM3 (ref 0–0.05)
IMM GRANULOCYTES NFR BLD AUTO: 0.3 % (ref 0–0.5)
INR PPP: 1.51 (ref 0.9–1.1)
LYMPHOCYTES # BLD AUTO: 0.58 10*3/MM3 (ref 0.7–3.1)
LYMPHOCYTES NFR BLD AUTO: 19.5 % (ref 19.6–45.3)
MCH RBC QN AUTO: 30.2 PG (ref 26.6–33)
MCHC RBC AUTO-ENTMCNC: 33.7 G/DL (ref 31.5–35.7)
MCV RBC AUTO: 89.6 FL (ref 79–97)
MONOCYTES # BLD AUTO: 0.17 10*3/MM3 (ref 0.1–0.9)
MONOCYTES NFR BLD AUTO: 5.7 % (ref 5–12)
NEUTROPHILS NFR BLD AUTO: 2.07 10*3/MM3 (ref 1.7–7)
NEUTROPHILS NFR BLD AUTO: 69.5 % (ref 42.7–76)
NRBC BLD AUTO-RTO: 0 /100 WBC (ref 0–0.2)
PLATELET # BLD AUTO: 235 10*3/MM3 (ref 140–450)
PMV BLD AUTO: 8.1 FL (ref 6–12)
POTASSIUM SERPL-SCNC: 4.3 MMOL/L (ref 3.5–5.2)
PROT SERPL-MCNC: 6.6 G/DL (ref 6–8.5)
PROTHROMBIN TIME: 18 SECONDS (ref 11.7–14.2)
RBC # BLD AUTO: 4.24 10*6/MM3 (ref 3.77–5.28)
SODIUM SERPL-SCNC: 135 MMOL/L (ref 136–145)
WBC # BLD AUTO: 2.98 10*3/MM3 (ref 3.4–10.8)

## 2020-11-16 PROCEDURE — 85025 COMPLETE CBC W/AUTO DIFF WBC: CPT | Performed by: EMERGENCY MEDICINE

## 2020-11-16 PROCEDURE — 85610 PROTHROMBIN TIME: CPT | Performed by: EMERGENCY MEDICINE

## 2020-11-16 PROCEDURE — 71275 CT ANGIOGRAPHY CHEST: CPT

## 2020-11-16 PROCEDURE — 63710000001 PREDNISONE PER 1 MG: Performed by: EMERGENCY MEDICINE

## 2020-11-16 PROCEDURE — 25010000002 HYDROMORPHONE 1 MG/ML SOLUTION: Performed by: EMERGENCY MEDICINE

## 2020-11-16 PROCEDURE — 25010000002 IOPAMIDOL 61 % SOLUTION: Performed by: EMERGENCY MEDICINE

## 2020-11-16 PROCEDURE — 80053 COMPREHEN METABOLIC PANEL: CPT | Performed by: EMERGENCY MEDICINE

## 2020-11-16 PROCEDURE — 96374 THER/PROPH/DIAG INJ IV PUSH: CPT

## 2020-11-16 PROCEDURE — 72125 CT NECK SPINE W/O DYE: CPT

## 2020-11-16 PROCEDURE — 99024 POSTOP FOLLOW-UP VISIT: CPT | Performed by: COLON & RECTAL SURGERY

## 2020-11-16 PROCEDURE — 99284 EMERGENCY DEPT VISIT MOD MDM: CPT

## 2020-11-16 RX ORDER — SODIUM CHLORIDE 0.9 % (FLUSH) 0.9 %
10 SYRINGE (ML) INJECTION AS NEEDED
Status: DISCONTINUED | OUTPATIENT
Start: 2020-11-16 | End: 2020-11-16 | Stop reason: HOSPADM

## 2020-11-16 RX ORDER — PREDNISONE 10 MG/1
TABLET ORAL
Qty: 28 TABLET | Refills: 0 | Status: SHIPPED | OUTPATIENT
Start: 2020-11-16 | End: 2020-12-17

## 2020-11-16 RX ORDER — PREDNISONE 20 MG/1
60 TABLET ORAL ONCE
Status: COMPLETED | OUTPATIENT
Start: 2020-11-16 | End: 2020-11-16

## 2020-11-16 RX ADMIN — HYDROMORPHONE HYDROCHLORIDE 1 MG: 1 INJECTION, SOLUTION INTRAMUSCULAR; INTRAVENOUS; SUBCUTANEOUS at 09:12

## 2020-11-16 RX ADMIN — IOPAMIDOL 95 ML: 612 INJECTION, SOLUTION INTRAVENOUS at 10:09

## 2020-11-16 RX ADMIN — PREDNISONE 60 MG: 20 TABLET ORAL at 12:44

## 2020-11-16 RX ADMIN — SODIUM CHLORIDE 500 ML: 9 INJECTION, SOLUTION INTRAVENOUS at 09:12

## 2020-11-16 NOTE — ED PROVIDER NOTES
EMERGENCY DEPARTMENT ENCOUNTER    Room Number:  10/10  Date of encounter:  11/16/2020  PCP: Deepika Vela III, NP-C  Historian: Patient    Patient was placed in face mask during triage process. Patient was wearing facemask when I entered the room and throughout our encounter. I wore full protective equipment throughout this patient encounter including a face mask, eye protection, and gloves. Hand hygiene was performed before donning protective equipment and again following doffing of PPE after leaving the room.    HPI:  Chief Complaint: Right trapezius/scapular area discomfort  A complete HPI/ROS/PMH/PSH/SH/FH are unobtainable due to: N/A   Context: Carole Weaver is a 40 y.o. female who presents to the ED c/o right-sided trapezius area neck pain is uncontrolled today.  She notes the pain began 3 weeks ago during chemotherapy infusion and resolved after couple days spontaneously.  Symptoms recurred 1 week ago during chemotherapy infusion and has been slightly improved over last 4 to 5 days until last 24 hours which pain has been quite uncomfortable.  She notes some worsening with deep inspiration and some with range of motion of the neck.  She denies any radiation down her right arm, no numbness or or weakness.  No reported fevers.  No chest pain, hemoptysis.  Patient is treated for colon cancer with metastatic disease to the lungs.  She is primarily concerned about spread or progression of the disease and/or pulmonary embolism as she has history of factor V Leiden as well though she is anticoagulated.  She denies new dyspnea, fever, loss of taste or smell, black or bloody stools, dysuria or hematuria.      MEDICAL HISTORY REVIEW  Date of Admission: 8/12/2020  Date of Discharge:  8/14/2020     Presenting Problem/History of Present Illness  Abdominopelvic abscess (CMS/HCC) [K65.1]  Abdominopelvic abscess (CMS/HCC) [K65.1]          PAST MEDICAL HISTORY  Active Ambulatory Problems     Diagnosis Date Noted    • Anxiety 08/09/2016   • Irritable bowel syndrome 08/09/2016   • Immunization due 11/08/2018   • Monopolar depression (CMS/HCC) 05/09/2019   • Adenocarcinoma of rectum (CMS/HCC) 07/12/2019   • Family history of factor V Leiden mutation 08/01/2019   • Heterozygous factor V Leiden mutation (CMS/HCC) 08/02/2019   • Encounter for fitting and adjustment of vascular catheter 08/07/2019   • Functional diarrhea 09/03/2019   • Adverse effect of antineoplastic and immunosuppressive drugs, initial encounter 09/03/2019   • Hypokalemia 09/03/2019   • Hypomagnesemia 09/03/2019   • Other pulmonary embolism without acute cor pulmonale (CMS/HCC) 09/20/2019   • UTI (urinary tract infection) 09/20/2019   • Kidney stone on right side 10/07/2019   • Hydronephrosis with ureteropelvic junction (UPJ) obstruction 10/15/2019   • Partial intestinal obstruction, unspecified as to cause (CMS/HCC) 07/08/2019   • Irregular heart beat 12/10/2019   • Hemorrhoids, external 12/10/2019   • Hematochezia 12/10/2019   • Gastrointestinal hemorrhage, unspecified 07/08/2019   • Esophagitis 07/08/2019   • Family history of colonic polyps 12/10/2019   • Chronic diarrhea 12/10/2019   • Calculus of gallbladder 12/10/2019   • Benign neoplasm of transverse colon 07/08/2019   • Benign neoplasm of rectum and anal canal 07/08/2019   • Loss of weight 12/10/2019   • Heart murmur 12/10/2019   • Anemia 01/22/2020   • Anticoagulation adequate 01/22/2020   • Pain associated with surgical procedure 01/29/2020   • Iron deficiency 02/05/2020   • Adverse effect of iron 02/05/2020   • Drug-induced peripheral neuropathy (CMS/HCC) 04/15/2020   • On antineoplastic chemotherapy 04/25/2020   • Chemotherapy-induced thrombocytopenia 04/25/2020   • HTN (hypertension) 04/25/2020   • Ileostomy present (CMS/HCC) 04/25/2020   • Chronic anticoagulation 04/25/2020   • Chemotherapy-induced neutropenia (CMS/HCC) 04/29/2020   • Hand foot syndrome 05/13/2020   • Chemotherapy-induced  thrombocytopenia 2020   • Pulmonary nodule, right 2020   • Rectal cancer (CMS/HCC) 2020   • Abdominopelvic abscess (CMS/HCC) 2020   • Perirectal abscess 2020   • Pulmonary nodules 2020   • Secondary cancer of lung (CMS/HCC) 10/05/2020   • Leukocytopenia 11/10/2020     Resolved Ambulatory Problems     Diagnosis Date Noted   • Rectal cancer (CMS/HCC) 2019   • Rectal cancer (CMS/HCC) 2020     Past Medical History:   Diagnosis Date   • Abnormal Pap smear of cervix 1998   • Anemia in pregnancy    • Bilateral epiphora 2020   • Bilateral hand swelling 2020   • Cervical lymphadenitis 2012   • Chemotherapy induced neutropenia (CMS/HCC) 2020   • Chemotherapy-induced nausea 2020   • Colon polyps    • Cystitis 2020   • Fistula of intestine    • GERD (gastroesophageal reflux disease)    • Hemorrhoids    • History of anemia    • History of chemotherapy    • History of gestational diabetes    • History of pre-eclampsia    • History of radiation therapy    • History of TB skin testing 2009   • HPV in female    • IBS (irritable bowel syndrome)    • Ileostomy in place (CMS/[facility])    • Infected insect bite of neck 2016   • Kidney stones 2007   • Lump of right breast    • On anticoagulant therapy    • PIH (pregnancy induced hypertension)    • Pulmonary embolism without acute cor pulmonale (CMS/HCC) 2019   • Right ureteral stone 10/01/2019   • SAB (spontaneous ) 1996   • Sciatica    • Sepsis due to cellulitis (CMS/HCC) 2002   • Tachycardia 2020   • Urinary urgency 2020         PAST SURGICAL HISTORY  Past Surgical History:   Procedure Laterality Date   • CHOLECYSTECTOMY N/A 10/10/2003    LAPAROSCOPIC WITH CHOLANGIOGRAM, DR. JAMEY TALAVERA AT [facility]   • COLON RESECTION N/A 2019    Procedure: laparoscopic low anterior colon resection with mobilization of splenic flexure and diverting loop  ileostomy: ERAS;  Surgeon: Geronimo Ye MD;  Location: [facility] MAIN OR;  Service: General   • COLON RESECTION N/A 8/3/2020    Procedure: LOW ANTERIOR COLON RESECTION, COLOANAL ANASTOMOSIS, MOBILIZATION SPLENIC FLEXURE;  Surgeon: Geronimo Ye MD;  Location: [facility] MAIN OR;  Service: General;  Laterality: N/A;   • COLONOSCOPY N/A 7/15/2020    PATENT ANASTAMOSIS IN MID RECTUM, RESCOPE IN 1 YR, DR. GERONIMO YE AT [facility]   • COLONOSCOPY W/ POLYPECTOMY N/A 07/08/2019    15 MM TUBULOVILLOUS ADENOMA POLYP IN HEPATIC FLEXURE, 20 MMTUBULOVILLOUS ADENOMA WITH HIGH GRADE DYSPLASIA IN RECTOSIGMOID, 4 CM MASS IN MID RECTUM, PATH: INVASIVE MODERATELY DIFFERENTIATED ADENOCARCINOMA, DR. JENNIFER LI AT [facility]   • DILATATION AND EVACUATION N/A 2009   • ENDOSCOPY N/A 07/08/2019    LA GRADE A ESOPHAGITIS, GASTRITIS, ALL BIOPSIES BENIGN, DR. JENNIFER LI AT [facility]   • INCISION AND DRAINAGE PERIRECTAL ABSCESS N/A 8/14/2020    Procedure: INCISION AND DRAINAGE OF retrorectal dehiscence abcess with drain placement and irrigation;  Surgeon: Geronimo Ye MD;  Location: [facility] MAIN OR;  Service: General;  Laterality: N/A;   • PAP SMEAR N/A 01/24/2014   • SIGMOIDOSCOPY N/A 7/24/2019    INFILTRATIVE PARTIALLY OBSTRUCTING LARGE RECTAL CANCER, AREA TATOOED, DR. GERONIMO YE AT [facility]   • SIGMOIDOSCOPY N/A 11/23/2019    AN ULCERATED PARTIALLY OBSTRUCTING MASS IN MID RECTUM, AREA TATTOOED, DR. GERONIMO YE AT [facility]   • TRANSRECTAL ULTRASOUND N/A 7/24/2019    Procedure: ULTRASOUND TRANSRECTAL;  Surgeon: Geronimo Ye MD;  Location: [facility] ENDOSCOPY;  Service: General   • URETEROSCOPY LASER LITHOTRIPSY WITH STENT INSERTION Right 10/30/2019    DR. ESTUARDO BEASLEY AT [place]   • VAGINAL DELIVERY N/A 09/18/1998   • VENOUS ACCESS DEVICE (PORT) INSERTION Left 8/9/2019    Procedure: INSERTION VENOUS ACCESS DEVICE;  Surgeon: Sj Joseph MD;  [facility];  Service: General   • WISDOM TOOTH EXTRACTION  Bilateral 1993         FAMILY HISTORY  Family History   Problem Relation Age of Onset   • Hyperlipidemia Mother    • Colon polyps Mother    • Heart disease Mother    • Hypertension Mother    • Factor V Leiden deficiency Mother    • Skin cancer Father         Squamous in 60s   • Heart disease Paternal Grandfather    • No Known Problems Son    • Cancer Paternal Uncle    • Colon cancer Paternal Uncle    • Diabetes Paternal Uncle    • Mental illness Sister         Addiction   • Colon cancer Maternal Uncle    • Malig Hyperthermia Neg Hx          SOCIAL HISTORY  Social History     Socioeconomic History   • Marital status:      Spouse name: Justus   • Number of children: 1   • Years of education: College   • Highest education level: Not on file   Occupational History   • Occupation:      Comment: [employer]     Employer: [employer]   Tobacco Use   • Smoking status: Heavy Tobacco Smoker     Packs/day: 1.00     Years: 24.00     Pack years: 24.00     Types: Electronic Cigarette, Cigarettes   • Smokeless tobacco: Never Used   • Tobacco comment: LAST CIGARETTE 8/12/2020   Substance and Sexual Activity   • Alcohol use: Not Currently   • Drug use: No   • Sexual activity: Defer         ALLERGIES  Patient has no known allergies.        REVIEW OF SYSTEMS  Review of Systems     All systems reviewed and negative except for those discussed in HPI.       PHYSICAL EXAM    I have reviewed the triage vital signs and nursing notes.    ED Triage Vitals   Temp Heart Rate Resp BP SpO2   11/16/20 0744 11/16/20 0744 11/16/20 0744 11/16/20 0750 11/16/20 0744   96.9 °F (36.1 °C) 114 18 (!) 132/105 99 %      Temp src Heart Rate Source Patient Position BP Location FiO2 (%)   11/16/20 0744 11/16/20 0744 -- -- --   Tympanic Monitor          Physical Exam    Physical Exam   Constitutional: No distress.  Nontoxic-appearing  HENT:  Head: Normocephalic and atraumatic.   Oropharynx: Mucous membranes are moist.   Eyes: No scleral  icterus. No conjunctival pallor.  Neck: Painless range of motion noted. Neck supple.   Cardiovascular: Normal rate, regular rhythm and intact distal pulses.  Pulmonary/Chest: No respiratory distress. There are no wheezes, no rhonchi, and no rales.   Abdominal: Soft. There is no tenderness. There is no rebound and no guarding.   Musculoskeletal: Moves all extremities equally. There is no pedal edema or calf tenderness.   Neurological: Alert.  Baseline strength and sensation noted.   Skin: Skin is pink, warm, and dry. No pallor.   Psychiatric: Mood and affect normal.   Nursing note and vitals reviewed.    LAB RESULTS  Recent Results (from the past 24 hour(s))   Comprehensive Metabolic Panel    Collection Time: 11/16/20  9:03 AM    Specimen: Blood   Result Value Ref Range    Glucose 96 65 - 99 mg/dL    BUN 9 6 - 20 mg/dL    Creatinine 0.70 0.57 - 1.00 mg/dL    Sodium 135 (L) 136 - 145 mmol/L    Potassium 4.3 3.5 - 5.2 mmol/L    Chloride 103 98 - 107 mmol/L    CO2 25.9 22.0 - 29.0 mmol/L    Calcium 8.8 8.6 - 10.5 mg/dL    Total Protein 6.6 6.0 - 8.5 g/dL    Albumin 3.70 3.50 - 5.20 g/dL    ALT (SGPT) 30 1 - 33 U/L    AST (SGOT) 14 1 - 32 U/L    Alkaline Phosphatase 115 39 - 117 U/L    Total Bilirubin <0.2 0.0 - 1.2 mg/dL    eGFR Non African Amer 92 >60 mL/min/1.73    Globulin 2.9 gm/dL    A/G Ratio 1.3 g/dL    BUN/Creatinine Ratio 12.9 7.0 - 25.0    Anion Gap 6.1 5.0 - 15.0 mmol/L   Protime-INR    Collection Time: 11/16/20  9:03 AM    Specimen: Blood   Result Value Ref Range    Protime 18.0 (H) 11.7 - 14.2 Seconds    INR 1.51 (H) 0.90 - 1.10   CBC Auto Differential    Collection Time: 11/16/20  9:03 AM    Specimen: Blood   Result Value Ref Range    WBC 2.98 (L) 3.40 - 10.80 10*3/mm3    RBC 4.24 3.77 - 5.28 10*6/mm3    Hemoglobin 12.8 12.0 - 15.9 g/dL    Hematocrit 38.0 34.0 - 46.6 %    MCV 89.6 79.0 - 97.0 fL    MCH 30.2 26.6 - 33.0 pg    MCHC 33.7 31.5 - 35.7 g/dL    RDW 14.7 12.3 - 15.4 %    RDW-SD 45.9 37.0 - 54.0 fl     MPV 8.1 6.0 - 12.0 fL    Platelets 235 140 - 450 10*3/mm3    Neutrophil % 69.5 42.7 - 76.0 %    Lymphocyte % 19.5 (L) 19.6 - 45.3 %    Monocyte % 5.7 5.0 - 12.0 %    Eosinophil % 4.7 0.3 - 6.2 %    Basophil % 0.3 0.0 - 1.5 %    Immature Grans % 0.3 0.0 - 0.5 %    Neutrophils, Absolute 2.07 1.70 - 7.00 10*3/mm3    Lymphocytes, Absolute 0.58 (L) 0.70 - 3.10 10*3/mm3    Monocytes, Absolute 0.17 0.10 - 0.90 10*3/mm3    Eosinophils, Absolute 0.14 0.00 - 0.40 10*3/mm3    Basophils, Absolute 0.01 0.00 - 0.20 10*3/mm3    Immature Grans, Absolute 0.01 0.00 - 0.05 10*3/mm3    nRBC 0.0 0.0 - 0.2 /100 WBC       Ordered the above labs and independently reviewed the results.        RADIOLOGY  Ct Cervical Spine Without Contrast    Result Date: 11/16/2020  EMERGENCY NONCONTRAST CT SCAN OF THE CERVICAL SPINE 11/16/2020  CLINICAL HISTORY: Nontraumatic right trapezius area pain with history of cancer.  TECHNIQUE: Spiral CT images were obtained from the skull base down to the T1-2 thoracic level and images were reformatted and submitted in 2 mm thick axial and sagittal CT section with soft tissue algorithm, 1 mm thick axial and sagittal and coronal CT sections with high-resolution bone algorithm.  FINDINGS: The craniocervical junction is normal in appearance.  At C1-2 there is failure of fusion of posterior midline of the ring of C1 which is felt to be a congenital normal anatomic variation. Otherwise the C1-2 level is normal in appearance.  At C2-3 the disc space, facets and uncovertebral joints are normal with no canal or foraminal narrowing.  At C3-4 there is reversal of the normal cervical lordosis centered at the C3-4 cervical level. There is minimal posterior disc bulge. The facets and uncovertebral joints are normal and there is no canal or foraminal narrowing.  At C4-5 the disc space, facets and uncovertebral joints are normal with no canal or foraminal narrowing.  At C5-6 the posterior disc margin, facets and uncovertebral  joints are normal with no canal or foraminal narrowing.  At C6-7 the posterior disc margin, facets and uncovertebral joints are normal with no canal or foraminal narrowing.  At C7-T1 the posterior disc margin and facets are normal with no canal or foraminal narrowing.  At T1-2 the posterior disc margin and facets are normal with no canal or foraminal narrowing.  Images are very grainy from the horizontal level of the inferior body of C5 to the superior endplate of T2 thoracic level and limits evaluation of bony detail. No obvious lytic or blastic lesions are seen in the cervical spine, no fractures identified.      The images are very grainy from C5 down to the superior endplate of T2 thoracic level and limits evaluation of bony detail. There is reversal of the normal cervical lordosis centered at C3-4 but overall the cervical spine CT is within normal limits with no disc herniation, canal or foraminal narrowing identified. No obvious lytic or blastic lesions seen in the cervical spine with no CT evidence of metastatic disease of the cervical spine. Etiology of the right trapezius pain is not established on this exam. MRI of the cervical and thoracic spine with and without contrast could be obtained for more comprehensive assessment if clinically indicated.  Radiation dose reduction techniques were utilized, including automated exposure control and exposure modulation based on body size.         I ordered the above noted radiological studies. Reviewed by me and discussed with radiologist.  See dictation for official radiology interpretation.      PROCEDURES    Procedures        MEDICATIONS GIVEN IN ER    Medications   sodium chloride 0.9 % flush 10 mL (has no administration in time range)   predniSONE (DELTASONE) tablet 60 mg (has no administration in time range)   sodium chloride 0.9 % bolus 500 mL (500 mL Intravenous New Bag 11/16/20 0912)   HYDROmorphone (DILAUDID) injection 1 mg (1 mg Intravenous Given 11/16/20  "0912)   iopamidol (ISOVUE-300) 61 % injection 100 mL (95 mL Intravenous Given by Other 11/16/20 1009)         PROGRESS, DATA ANALYSIS, CONSULTS, AND MEDICAL DECISION MAKING    My differential includes but is not limited to neck pain, cervical contusion, degenerative disc disease, herniated disc, acute cervical strain, cervical radiculopathy, cervical fracture, torticollis, carotid artery dissection and vertebral artery dissection      All labs have been independently reviewed by me.  All radiology studies have been reviewed by me and discussed with radiologist dictating the report.   EKG's independently viewed and interpreted by me.  Discussion below represents my analysis of pertinent findings related to patient's condition, differential diagnosis, treatment plan and final disposition.      ED Course as of Nov 16 1233   Mon Nov 16, 2020   0915 Patient took hydrocodone this morning without significant relief.  She is requesting some medication for discomfort at this time.  I think is quite reasonable.  We will plan for CT cervical spine without contrast and CT angio of the chest to evaluate for acute pathology causing her pain.  Patient agreeable.    [RS]   1116 CT cervical spine reviewed with radiologist, Dr. Connell -no clearly acute pathology including no obvious lytic bony lesions or pathologic fractures.  No significant degenerative change noted either.  He does note limitations of CT in evaluating soft tissue mass.    [RS]   1128 CT angiography of the chest reviewed with the radiologist, Dr. Craft.  No PE, nodules appear decreased in size.  No bony mets appreciated.  No clear source of the patient's discomfort identified.    [RS]   1130 Patient reports that she has hydrocodone as well as Robaxin in her \"cancer box\" at home.  She has tried her hydrocodone but has not attempted any Robaxin.  I will consult with the patient's primary oncologist to inquire as to any other therapy such as steroids that might be " beneficial in this patient prior to discharge.    [RS]   1231 CONSULT        Provider: Dr. Alvarenga - Onc    Discussion: Reviewed patient history, ED presentation and evaluation as well as current therapies.  He is agreeable with plan for 1 week course of steroids and outpatient follow-up.    Agreeable c treatment and planned disposition.            [RS]      ED Course User Index  [RS] Roland Huber MD       AS OF 12:33 EST VITALS:    BP - 149/93  HR - 114  TEMP - 96.9 °F (36.1 °C) (Tympanic)  O2 SATS - 96%        DIAGNOSIS  Final diagnoses:   Acute pain of right shoulder   Malignant neoplasm of lung, unspecified laterality, unspecified part of lung (CMS/HCC)   Alteration in comfort associated with chemotherapy   Chronic anticoagulation         DISPOSITION  DISCHARGE    Patient discharged in stable condition.    Reviewed implications of results, diagnosis, meds, responsibility to follow up, warning signs and symptoms of possible worsening, potential complications and reasons to return to ER.    Patient/Family voiced understanding of above instructions.    Discussed plan for discharge, as there is no emergent indication for admission. Patient referred to primary care provider for regular health maintenance. Pt/family is agreeable and understands need for follow up and possible repeat testing.  Pt is aware that discharge does not mean that nothing is wrong but it indicates no emergency is present that requires admission and they must continue care with follow-up as given below or physician of their choice.     FOLLOW-UP  Deepika Vela III, NP-C  [address] [facility] [address]  [place] [id number] [phone]    Go in 3 days  Reevaluation of shoulder discomfort and potential need for MRI versus physical therapy    Dong Nguyen MD PhD  [address] [facility] [address]  [place] [id number] [phone]    Schedule an appointment as soon as possible for a visit   As needed         Medication List      New  Prescriptions    diclofenac 1 % gel gel  Commonly known as: VOLTAREN  Apply 4 g topically to the appropriate area as directed 2 (Two) Times a Day.     predniSONE 10 MG tablet  Commonly known as: DELTASONE  Take 4 tablets by mouth daily for 5 days then 2 tablets by mouth daily for 3 days then 1 tablet by mouth daily for 2 days        Changed    famotidine 20 MG tablet  Commonly known as: PEPCID  TAKE 1 TABLET BY MOUTH TWICE A DAY  What changed: when to take this           Where to Get Your Medications      You can get these medications from any pharmacy    Bring a paper prescription for each of these medications  · diclofenac 1 % gel gel  · predniSONE 10 MG tablet            Roland Huber MD  11/16/20 [id number]

## 2020-11-16 NOTE — ED NOTES
Pt c/o pain to R side of neck that radiates to R shoulder. Reports she has colon cancer and had last chemo treatment last tuesday which is when pain began. Was told it was a side effect although pain worse. NKI. Denies heavy lifting. Pt wearing mask. This RN wearing mask and safety glasses.       Adelita Eckert, RN  11/16/20 1561

## 2020-11-23 ENCOUNTER — OFFICE VISIT (OUTPATIENT)
Dept: PSYCHIATRY | Facility: HOSPITAL | Age: 40
End: 2020-11-23

## 2020-11-23 DIAGNOSIS — F33.1 MAJOR DEPRESSIVE DISORDER, RECURRENT EPISODE, MODERATE (HCC): ICD-10-CM

## 2020-11-23 DIAGNOSIS — F41.1 GENERALIZED ANXIETY DISORDER: Primary | ICD-10-CM

## 2020-11-23 PROCEDURE — 99214 OFFICE O/P EST MOD 30 MIN: CPT | Performed by: NURSE PRACTITIONER

## 2020-11-23 NOTE — PROGRESS NOTES
[facility]  In Person Session    Subjective  Patient ID: Carole Weaver is a 40 y.o. female is seen face to face in the [facility] ([facility].    Pt noted to be alert, oriented, and engaged in conversation. Affect and mood euthymic.  Sleep described as being easy to initiate, continuous, and restorative since initiation of Remeron; has reduced lexapro by half, and appreciates dissolvable tablet due to difficulty with pills.  Pt appreciates feeling as though mood is improved; finds her sense of hope is restored, and engagement with loved ones is more like 'her.' States her  feels like 'she is back.'  Reviews many changes in past couple of weeks, acknowledging statement of '18-24 month prognosis' has been very difficult to accept. Reviews second opinion, pain precipitating ED visit with scan demonstrating improvement in lungs, and improvement communication with  as all being contributing factors to reduced sense of demise.  Plans to do the next three rounds of treatment with Dr. Nguyen and then get scans and make next decision at that time. Reviews worry surrounding pain and sense of soreness in upper abdomen with inhalation and overeating, although finds ability to process this and reduce sense of urgency to be improved.  Pt reviews sense of normalcy with appreciation of [place] shopping and time spent with  and son. Appreciates feeling of activity, excitement, and fun.  Pt has had wound vac discontinued, activity level improved, appreciates being back to work.  Plans to keep Thanksgiving small; is super close with Mom and Dad and will see them with  and son.  Appetite is increased with weight increase since past visit. Feels she is eating the same amount, not large portions. Finds she requires more frequent meals; previously not eating more than once a day. Has not experienced hunger in a long time.    Medications Reviewed:  Remeron 15 mg  daily    Diagnoses and all orders for this visit:    1. Generalized anxiety disorder (Primary)    2. Major depressive disorder, recurrent episode, moderate (CMS/HCC)    Plan of Care  Patient with report of improved mood and anxiety symptoms since initiating Remeron 15 mg nightly; will discontinue Lexapro.  Explored with patient side effect of weight gain, and have encouraged activity as able as well as eating small meals frequently throughout the day.  We will continue to monitor.  Reviewed with patient concerns surrounding existential distress, effective communication strategies, management of uncertainty and anticipatory anxiety, concern surrounding optimism and failed hope, and current physical symptoms.  Discussed availability of support through chaplain Jere.  We will plan for next visit with .    Total time spent  face to face with patient: 40 minutes.   32 minutes spent in supportive counseling surrounding issues of existential distress, effective communication strategies, management of uncertainty and anticipatory anxiety, concern surrounding optimism and failed hope, and current physical symptoms.

## 2020-11-23 NOTE — PROGRESS NOTES
REASON FOR FOLLOW UP:     1. Rectal cancer, rectal ultrasound examination in July 2019 reported T3N0 disease without lymphadenopathy. She does have small pulmonary nodule 6-7 mm and 2 mm with indeterminate feature. There is no solid evidence of metastatic disease otherwise. Patient has stage IIA (T3N0M0) disease.  2. The patient is heterozygous factor V Leiden, was on prophylactic anticoagulation with Lovenox 40 mg daily given her increased risk of thrombosis with MediPort and GI malignancy.   3. PET scan on 8/8/2019 reported an 8 mm hypermetabolic right upper lobe pulmonary nodule, which is suspicious for metastatic as well.    4. Patient had a PowerPort placement on the left upper chest by Dr. Joseph on 8/9/2019.  5. Patient was started on concurrent infusional 5-FU chemoradiation therapy on 8/12/2019, with planned complete surgical resection and further adjuvant chemotherapy with intention to cure the disease.   6. Patient underwent surgical resection of the primary rectal cancer by Dr. Ye on 12/2/2019.  Pathology evaluation reported residual T3N1 disease stage IIIb.  7. Diarrhea related to therapy and radiation.   8. Patient was started cycle 1 day 1 of adjuvant FOLFOX 6 on 1/23/2020.  9. On 2/5/2020 FOLFOX 6 cycle 2 delayed secondary to neutropenia.  Patient was given 3 days of Granix injection.  After cycle #2 we planned 3 days of Granix with each cycle.   10. Patient also intolerant of oral iron.  Patient received 2 doses of IV injectafer on 02/05/2020 and 02/12/2020.   11. 02/12/2020 Proceed with cycle #2 of FOLFOX 6 with 3 days of Granix.  12. On 3/11/2020 cycle 4 postponed secondary to abdominal pain and occasional low-grade fevers.  CT scans ordered.  13. Cycle #4 resumed after CT scan revealed no evidence of disease.  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 Ye. patient has no fever.  Continue to observe.   14. Grade 3  hand-foot syndrome secondary to 5-FU after cycle #7 FOLFOX 6 chemotherapy, prompting ER visit.  Also has worsening peripheral neuropathy.   15. Cycle #8 FOLFOX 6 was given on 5/13/2020, with 50% dose reduction for both 5-FU and oxaliplatin, and also examination of bolus 5-FU.   16. PET scan examination on 5/21/2020 reported no evidence of metastatic disease in the chest abdomen pelvis.  Stable 6 mm RUL pulmonary nodule.  17. On 5/27/2020, I discussed with the patient that we can discontinue chemotherapy at this time.   18. Patient had a surgical procedure for low anterior colon resection, coloanal anastomosis on 8/3/2020.  19. CT scan for chest abdomen pelvis on 9/9/2020 reported a new 8 mm noncalcified pulmonary nodule in the left lower lobe of the lung.  Otherwise stable right upper lobe 6 mm pulmonary nodule, and no evidence of disease recurrence in the abdomen or pelvis.  20. PET scan examination on 9/18/2020 reported multiple hypermetabolic small pulmonary nodules/ new pulmonary nodules.   21. Patient was started on pelvic chemotherapy with FOLFIRI regimen on 10/13/2020.   22. 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  But wild-type NRAS. It reported tumor mutation burden 5 Muts/Mb, microsatellite stable, TP53 mutation R282W, and others.    HISTORY OF PRESENT ILLNESS:  The patient is a 40 y.o. female with the above medical history who returns today for follow-up, due for cycle #4 of palliative FOLFIRI.    Patient recently evaluated through supportive oncology services and started on mirtazapine 15 mg nightly for depression.  Lexapro was also discontinued.  Patient reports that her mood is overall much improved and she is sleeping better.  She notes that she is a little bit more hungry with the mirtazapine and therefore gaining a bit of weight which frustrates her but she and the end is happy to feel better overall mentally.    After her most recent cycle #3, she was seen in  the ED on 11/16/2020 with acute pain in the right neck/shoulder.  She underwent CT angiogram of the chest as well as CT of the cervical spine with no acute findings.  Notably some pulmonary nodules were slightly decreased.  This has brought her encouragement and she is feeling very positive today.  She was given a prednisone taper which she continues at this time.  Pain is overall improved.  Interestingly she states the last 2 cycles she developed some discomfort in the right neck during treatment.  The first time she did not mention it.  Most recently with cycle #3 she did mention it to the nurse and per nursing notes this occurred while she was receiving irinotecan.  The pain is on the opposite side of her Mediport which is actually in the left chest wall.  Etiology is unclear.  We discussed monitoring symptoms again with treatment today and mentioning anything abnormal during infusion.  She agrees.    Patient continues with home health for healing lower abdominal wound which reportedly is only 1/8 of a centimeter deep at this point and healing well.  She has follow-up with Dr. Padmaja Ye on 12/7/2020.  Once patient has complete healing of abdominal wound we do tentatively plan to add Avastin to her care plan.    Patient's only other concern is noting some upper abdominal discomfort that comes and goes, improved after eating.  We discussed that this sounds ulcerative in nature.  She currently takes Pepcid.  She has noted some increased reflux several nights just in the last week.  We discussed adding a PPI.    She denies other concerns at this time.    Past Medical History:   Diagnosis Date   • Abdominopelvic abscess (CMS/HCC) 08/12/2020    ADMITTED TO [facility]   • Abnormal Pap smear of cervix 02/02/1998    JULIUS I   • Anemia in pregnancy    • Anxiety    • Bilateral epiphora 04/2020   • Bilateral hand swelling 05/02/2020    SEEN AT [facility] ER   • Cervical lymphadenitis 06/06/2012    SEEN AT [facility] ER   • Chemotherapy induced  neutropenia (CMS/HCC) 2020   • Chemotherapy-induced nausea 2020   • Chemotherapy-induced thrombocytopenia 2020   • Chronic diarrhea    • Colon polyps     FOLLOWED BY DR. GERONIMO ESPARZA   • Cystitis 2020    WITH DEHYDRATION, ADMITTED TO [facility]   • Cystitis 10/27/2020   • Drug-induced peripheral neuropathy (CMS/[facility]) 2020   • Fistula of intestine    • GERD (gastroesophageal reflux disease)    • Hand foot syndrome 2020   • Heart murmur     IN CHILDHOOD   • Hematochezia    • Hemorrhoids    • Heterozygous factor V Leiden mutation (CMS/[facility])     DX 2019   • History of anemia    • History of chemotherapy     FOLLOWED BY DR. ALEXANDRU HUNT   • History of gestational diabetes    • History of pre-eclampsia    • History of radiation therapy     FOLLOWED BY DR. JAVON LEWIS   • History of TB skin testing 2009    TB Skin Test   • HPV in female 1998   • Hypokalemia 2019   • Hypomagnesemia 2019   • IBS (irritable bowel syndrome)    • Ileostomy in place (CMS/[facility])     FOLLOWED BY DR. GERONIMO ESPARZA   • Infected insect bite of neck 2016    SEEN AT [facility]   • Kidney stones 2007    SEEN AT [facility] ER   • Lump of right breast     SWOLLEN LYMPH NODE   • Lung cancer (CMS/[facility]) 2020    METASTATIC LUNG CANCER   • Monopolar depression (CMS/[facility])    • On anticoagulant therapy    • Perirectal abscess 2020   • PIH (pregnancy induced hypertension)    • Pulmonary embolism without acute cor pulmonale (CMS/[facility]) 09/20/2019    X 3; ADMITTED TO [facility]   • Pulmonary nodule, right 2020   • Rectal cancer (CMS/[facility]) 2019    STAGE IIA, INVASIVE MODERATELY DIFFERENTIATED ADENOCARCINOMA, CHEMO AND XRT FINISHED 2019   • Right shoulder pain 2020    SEEN AT [facility] ER   • Right ureteral stone 10/01/2019    SEEN AT [facility] ER   • SAB (spontaneous ) 1996     A2-1 INDUCED   • Sciatica    • Sepsis due to cellulitis (CMS/[facility]) 2002    LEFT GREAT TOE, ADMITTED TO [facility]   •  Tachycardia 04/24/2020   • Urinary urgency 03/2020     Past Surgical History:   Procedure Laterality Date   • CHOLECYSTECTOMY N/A 10/10/2003    LAPAROSCOPIC WITH CHOLANGIOGRAM, DR. JAMEY TALAVERA AT [facility]   • COLON RESECTION N/A 12/2/2019    Procedure: laparoscopic low anterior colon resection with mobilization of splenic flexure and diverting loop ileostomy: ERAS;  Surgeon: Padmaja Esparza MD;  Location: [facility] MAIN OR;  Service: General   • COLON RESECTION N/A 8/3/2020    Procedure: LOW ANTERIOR COLON RESECTION, COLOANAL ANASTOMOSIS, MOBILIZATION SPLENIC FLEXURE;  Surgeon: Padmaja Esparza MD;  Location: [facility] MAIN OR;  Service: General;  Laterality: N/A;   • COLONOSCOPY N/A 7/15/2020    PATENT ANASTAMOSIS IN MID RECTUM, RESCOPE IN 1 YR, DR. PADMAJA ESPARZA AT [facility]   • COLONOSCOPY W/ POLYPECTOMY N/A 07/08/2019    15 MM TUBULOVILLOUS ADENOMA POLYP IN HEPATIC FLEXURE, 20 MMTUBULOVILLOUS ADENOMA WITH HIGH GRADE DYSPLASIA IN RECTOSIGMOID, 4 CM MASS IN MID RECTUM, PATH: INVASIVE MODERATELY DIFFERENTIATED ADENOCARCINOMA, DR. JENNIFER LI AT [facility]   • DILATATION AND EVACUATION N/A 2009   • ENDOSCOPY N/A 07/08/2019    LA GRADE A ESOPHAGITIS, GASTRITIS, ALL BIOPSIES BENIGN, DR. JENNIFER LI AT [facility]   • INCISION AND DRAINAGE PERIRECTAL ABSCESS N/A 8/14/2020    Procedure: INCISION AND DRAINAGE OF retrorectal dehiscence abcess with drain placement and irrigation;  Surgeon: Padmaja Esparza MD;  Location: [facility] MAIN OR;  Service: General;  Laterality: N/A;   • PAP SMEAR N/A 01/24/2014   • SIGMOIDOSCOPY N/A 7/24/2019    INFILTRATIVE PARTIALLY OBSTRUCTING LARGE RECTAL CANCER, AREA TATOOED, DR. PADMAJA ESPARZA AT [facility]   • SIGMOIDOSCOPY N/A 11/23/2019    AN ULCERATED PARTIALLY OBSTRUCTING MASS IN MID RECTUM, AREA TATTOOED, DR. PADMAJA ESPARZA AT [facility]   • TRANSRECTAL ULTRASOUND N/A 7/24/2019    Procedure: ULTRASOUND TRANSRECTAL;  Surgeon: Padmaja Esparza MD;  Location: [facility] ENDOSCOPY;  Service: General   •  URETEROSCOPY LASER LITHOTRIPSY WITH STENT INSERTION Right 10/30/2019    DR. ESTUARDO BEASLEY AT [place]   • VAGINAL DELIVERY N/A 09/18/1998   • VENOUS ACCESS DEVICE (PORT) INSERTION Left 8/9/2019    Procedure: INSERTION VENOUS ACCESS DEVICE;  Surgeon: Sj Joseph MD;  Location: [facility];  Service: General   • WISDOM TOOTH EXTRACTION Bilateral 1993       HEMATOLOGIC/ONCOLOGIC HISTORY:      The patient reports she has intermittent rectal bleeding and urgency, mucous with her stool, starting sometime in 2016. At that time she was referred to GI service, and was diagnosed of irritable bowel syndrome. Nevertheless she had worsening urgency for bowel movement, and had a couple of incidents losing control of stool. She was recently seen by Roland Thorpe MD again and had colonoscopy and EGD exam on 07/08/2019. She was found having a circumferential rectal mass. According to the procedure note, the patient had a fungating circumferential bleeding 4 cm mass in the middle rectum, at distance between 13 cm and 17 cm from the anus. Mass was causing partial obstruction. There were also colon polyps found at the hepatic flexure and also at the rectosigmoid junction 23 cm from the anus. Both were resected and retrieved. EGD examination reported grade A esophagitis at the GE junction and patchy discontinuous erythema and aggravation of the mucosa without bleeding in the stomach body. There is normal mucosa of the duodenum. Pathology evaluation from this procedure reported moderately differentiated adenocarcinoma involving the rectal mass. The rectal sigmoid junction polyp was tubular/tubulovillous adenoma with high grade dysplasia negative for invasive malignancy. The hepatic flexure polyp was also tubular/tubulovillous adenoma negative for high grade dysplasia or malignancy. The biopsy from the esophagus reports squamocolumnar mucosa with inflammatory changes suggestive of mild reflux esophagitis, negative for interstitial  metaplasia. Gastric biopsy was benign and duodenal biopsy was also benign. There is no mention of H-pylori.     Patient was subsequently referred to colorectal surgeon Padmaja Ye MD for further evaluation. The patient had CT scan examination for chest, abdomen and pelvis requested by Dr. Ye and were done on 07/13/2019. The study reported no evidence of lymphadenopathy in the abdomen and pelvis. There was wall thickening of the rectosigmoid junction. Normal spleen, pancreas, adrenal glands and kidneys. There was fatty liver infiltration but no focal lymphatic lesions. There was a small 6-7 mm noncalcified nodule in the right upper lobe and a tiny 3 mm subpleural nodule in the right middle lobe. No mediastinal or hilar lymphadenopathy.     Dr. Ye performed staging rectal ultrasound examination. This study reported tumor penetrating through the muscularis propria as T3 disease; there were no lymph nodes identified.    She had a hospitalization in late September 2019 because of newly discovered pulmonary emboli.  She was on prophylactic Lovenox prior to the incident of PE.  Now she is on full dose Xarelto anticoagulation.  Patient reports no further chest pain dyspnea.  She denies bleeding or bruising.  During her hospitalization, she was seen by Dr. eY, who plans to have surgery 8 to 12 weeks after finishing radiation therapy.  She finished her radiation on 9/19/2019.     I noticed patient also presented to the emergency room on 10/1/2019 complaining of right flank area pain.  I reviewed the images studies and indeed she has a very small 1 to 2 mm obstructing kidney stone in the UV junction.  Patient is still symptomatic with some pains and dysuria.  She denies fever sweating or chills.    Laboratory study on 10/7/2019 reported normal CBC including hemoglobin 13.1, platelets 301,000, WBC 6170 and ANC 4900 lymphocytes 590 monocytes 480.      The nurse reported malfunction of port-a-catheter on 10/7/2019.   Port study on 10/8/2019 showed fibrin sheath around the distal aspect of the Mediport catheter in the SVC. This does not appear to hinder injection, but does prevent aspiration at this time.    Patient underwent surgical resection of the colon on 12/2/2019 with Dr. Ye.  Pathology evaluation reported residual T3 disease, also 1 out of 14 lymph nodes positive for malignancy.  So this patient in either had at least stage IIIb disease (T3 N1 M0?).      Adjuvant chemotherapy FOLFOX 6 will be started on 1/22/2020.  Laboratory study reported iron saturation 10%, free iron 31 TIBC 319 and ferritin 168.  Her hemoglobin was 11.8, WBC 5600, and platelets 347,000.    Patient was here on 02/12/2020 for cycle 2 of her FOLFOX.  This is delayed x1 week secondary to neutropenia.  The patient ultimately received 3 days worth of Neupogen with recovery of her blood counts.  Of note, the patient struggled with significant nausea without any episodes of vomiting following cycle #1 of chemotherapy resulting in significant weight loss.  She is up 12 pounds over the last week as her appetite has normalized.  We will add Emend to her care plan.    She is having several loose stools per her colostomy and has been hesitant to take Imodium due to prior history of constipation.  I encouraged her to try this with a maximum of 8 tablets/day.  She denies any infectious symptoms including fevers or chills.  No excess bleeding or bruising noted.  She had the expected cold sensitivity related to the Oxaliplatin for about 3 days following treatment.    Labs from 02/12/2020 demonstrated total white blood cell count of 5.14, ANC of 3.06, hemoglobin of 11.2, platelets of 211,000.  She was found to be iron deficient last week and is intolerant to oral iron secondary to GI distress.  For this reason, she initiated IV iron therapy with Injectafer last week.  She had received her second dose 02/12/2020    Patient has normalized hemoglobin 12.2 on  2/26/2020.    She reported on 5/5/2020 she had a recent visit to the emergency department for what was suspected to be an allergic reaction.  She called our on-call service on Saturday with reports of hand and face swelling.  She was instructed to proceed to the emergency department at which time she was assessed and prescribed a Medrol Dosepak.  She had just completed her 5-FU and was unhooked on Friday, 5/3/2020.  Her symptoms have improved.  She does report persistent hyperpigmentation and mild swelling of the palms of the hands but this is much improved.  It was her right hand specifically that was swollen.  Her facial swelling has resolved.  She continues on Cefdinir nd since has 1 day remaining, she was prescribed cefdinir for a UTI requiring hospitalization at the end of April.  Reports no new symptoms.  Her labs are stable.  She is scheduled for treatment again.    Patient states at this time she is not tolerating her chemotherapy well.     Patient seen in the emergency department on 5/2/2020 for what was suspected to be an allergic reaction.  She called our on-call service on Saturday explaining that she was experiencing hand and face swelling.  She was instructed to proceed to the emergency department at which time she was assessed and prescribed a Medrol Dosepak.  She had just completed her 5-FU and was unhooked on Friday, 5/1/2020.      She reports since her ED visit on 5/2/2020 her symptoms have not improved. Her hands and feet were swollen upon presenting to the ED and she could barely make a fist. She states that she feels so swollen she is not able to stand it. She states that her skin on the hands and feet are peeling extensively as well, besides changing color to more dark.     She also reports significant fatigue after her first week of the 5-FU treatment but she expected this side effect. She also notices significant increase in her neuropathy to the point that she is not able to even walk around  in her home without her house slippers due to irritation from her rugs. She denies and associated nausea or vomiting at this time. She also has episodes of epistaxis every day after the chemotherapy cycle #7.  She does report working full-time during the week of chemotherapy.    Laboratory studies, 5/13/2020, show moderate thrombocytopenia platelets 123,000, low normal WBC 4140 including ANC 2720 and normal hemoglobin 13.4.  Because significant hand-foot syndrome, will decrease both 5-FU and oxaliplatin by 50%, and eliminate bolus dose of 5-FU.    On 5/21/2020 patient had a PET scan performed which showed no convincing evidence of residual disease in abdomen or pelvis, no metastatic disease within the chest or neck.     Cycle #8 FOLFOX 6 was given on 5/13/2020, with 50% dose reduction for both 5-FU and oxaliplatin, and also examination of bolus 5-FU.  She states on 5/27/2020 that with the recent reduction of the chemotherapy she feels significantly better. She has more energy and is able to do her daily routine.      PET scan examination on 5/21/2020 reported no evidence of metastatic disease in chest abdomen pelvis.  There was a stable 6 mm right upper lobe pulmonary nodule.    Laboratory studies on 5/27/2020 showed mild leukocytopenia WBC 3720 but a normal ANC 2250 and lymphocytes 630.  Normal platelets 163,000 and hemoglobin 12.6.  Chemistry lab reported normal renal function, liver function panel and a electrolytes, elevated glucose 164.    Laboratory studies 6/24/2020, showed normal hemoglobin 13.4 but macrocytosis .9.  Normal platelets 210,000 and WBC 5870.  She had normal CMP.     Patient last time was here in late June 2020.  Since that time she had surgical procedure for low anterior colon resection, coloanal anastomosis on 8/3/2020.  She later developed a perirectal abscess, required surgical drainage on 8/14/2020.  She was discharged on 8/27/2020.    Patient reports to me she still has lower  abdominal wall vacuum suction in place.  She denies fever sweating chills.  Performance status is ECOG 1.  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  She does have visiting nurse come to the home for wound care.    CT scan for chest abdomen pelvis on 9/9/2020 reported a new 8 mm noncalcified pulmonary nodule in the left lower lobe.  Otherwise stable right upper lobe 6 mm pulmonary nodule, and no evidence of disease recurrence in the abdomen or pelvis.     Laboratory study on 9/16/2020 reported elevated AST 55, ALT 95, alk phosphatase 143 but normal total bilirubin 0.3.  Chemistry lab otherwise unremarkable.  She has completed normal CBC.      Because of the abnormal CT scan examination for chest abdomen pelvis on 9/9/2020 reported a new 8 mm noncalcified pulmonary nodule in the left lower lobe, we obtained a PET scan examination for further evaluation, which was done on 9/18/2020.  This study reported several pulmonary nodules, some of them are hypermetabolic, newly developed compared to previous PET scan in May 2020.  Certainly does highly suspicious for metastatic disease.  There are also hypermetabolic activity in the abdomen and pelvic area which is hard to differentiate from surgical scar versus metastatic malignancy.    Laboratory study on 10/13/2020 reported normal CBC with Hb 13.9, platelets 302,000 and WBC 5520 including ANC 3830.  Chemistry lab reported normalized AST 20, alk phosphatase 116 improved ALT 35, and maintains normal bilirubin, with normal electrolytes and liver function panel.     Patient was started on first cycle of palliative chemotherapy FOLFIRI on 10/13/2020.    MEDICATIONS    Current Outpatient Medications:   •  clonazePAM (KlonoPIN) 1 MG tablet, Take 1 tablet by mouth 3 (Three) Times a Day As Needed for Anxiety or Seizures., Disp: 90 tablet, Rfl: 0  •  diclofenac (VOLTAREN) 1 % gel gel, Apply 4 g topically to the appropriate area as directed 2 (Two) Times a  Day., Disp: 100 g, Rfl: 0  •  dicyclomine (BENTYL) 20 MG tablet, TAKE 1 TABLET BY MOUTH EVERY 6 HOURS AS NEEDED, Disp: 120 tablet, Rfl: 2  •  diphenoxylate-atropine (LOMOTIL) 2.5-0.025 MG per tablet, Take 1 tablet by mouth 4 (Four) Times a Day As Needed for Diarrhea., Disp: 120 tablet, Rfl: 1  •  famotidine (PEPCID) 20 MG tablet, TAKE 1 TABLET BY MOUTH TWICE A DAY (Patient taking differently: Take 20 mg by mouth Every Evening.), Disp: 180 tablet, Rfl: 1  •  HYDROcodone-acetaminophen (NORCO) 7.5-325 MG per tablet, Take 1 tablet by mouth Every 6 (Six) Hours As Needed for Moderate Pain ., Disp: 240 tablet, Rfl: 0  •  Loratadine (CLARITIN) 10 MG capsule, Take 10 mg by mouth Every Evening., Disp: , Rfl:   •  mirtazapine (REMERON SOL-TAB) 15 MG disintegrating tablet, Place 1 tablet on the tongue Every Night., Disp: 30 tablet, Rfl: 2  •  ondansetron (ZOFRAN) 8 MG tablet, Take 1 tablet by mouth 3 (Three) Times a Day As Needed for Nausea or Vomiting., Disp: 60 tablet, Rfl: 5  •  predniSONE (DELTASONE) 10 MG tablet, Take 4 tablets by mouth daily for 5 days then 2 tablets by mouth daily for 3 days then 1 tablet by mouth daily for 2 days, Disp: 28 tablet, Rfl: 0  •  prochlorperazine (COMPAZINE) 10 MG tablet, Take 1 tablet by mouth Every 6 (Six) Hours As Needed for Nausea or Vomiting., Disp: 30 tablet, Rfl: 2  •  rivaroxaban (Xarelto) 20 MG tablet, Take 1 tablet by mouth Daily., Disp: 90 tablet, Rfl: 3  No current facility-administered medications for this visit.     Facility-Administered Medications Ordered in Other Visits:   •  atropine injection 0.25 mg, 0.25 mg, Intravenous, Q15 Min PRN, Gaby Cruz, APRN  •  dexamethasone (DECADRON) IVPB 12 mg, 12 mg, Intravenous, Once, Gaby Cruz, APRN  •  fluorouracil (ADRUCIL) 4,850 mg, sodium chloride 0.9 % 143 mL 240 mL chemo infusion - FOR HOME USE, 2,400 mg/m2 (Treatment Plan Recorded), Intravenous, Once, Gaby Cruz, JULIET  •  FOSAPREPITANT 150  MG/100ML NORMAL SALINE (CBC) IVPB 100 mL 100 mL, 150 mg, Intravenous, Once, Gaby Cruz., APRN  •  irinotecan (CAMPTOSAR) 365 mg in dextrose (D5W) 5 % 518.3 mL chemo IVPB, 180 mg/m2 (Treatment Plan Recorded), Intravenous, Once, Gaby Cruz, APRN  •  leucovorin 810 mg in sodium chloride 0.9 % 290.5 mL IVPB, 400 mg/m2 (Treatment Plan Recorded), Intravenous, Once, Gaby Cruz, APRN  •  palonosetron (ALOXI) injection 0.25 mg, 0.25 mg, Intravenous, Once, Gaby Cruz, APRN  •  sodium chloride 0.9 % infusion 250 mL, 250 mL, Intravenous, Once, Gaby Cruz, APRN    ALLERGIES:   No Known Allergies    SOCIAL HISTORY:       Social History     Tobacco Use   • Smoking status: Heavy Tobacco Smoker     Packs/day: 1.00     Years: 24.00     Pack years: 24.00     Types: Electronic Cigarette, Cigarettes   • Smokeless tobacco: Never Used   • Tobacco comment: LAST CIGARETTE 8/12/2020   Substance Use Topics   • Alcohol use: Not Currently   • Drug use: No         FAMILY HISTORY:   Mother has positive factor V Leiden mutation, although she did not have thrombosis, mother also is coronary disease with stenting, she also is occasional bruising.  Maternal grandmother had DVT, she had multiple surgical procedures.  Patient mother's half-brother had metastatic colon cancer at diagnosis in his 50s.  Maternal great aunt has breast cancer.  Patient will follow his skin cancer in his 60s, exclusive.    REVIEW OF SYSTEMS:  Review of Systems   Constitutional: Negative for activity change, appetite change, chills, diaphoresis, fatigue, fever and unexpected weight change.   HENT: Negative for congestion, hearing loss, mouth sores, nosebleeds and trouble swallowing.    Eyes: Negative for photophobia and visual disturbance.   Respiratory: Negative for cough, chest tightness and shortness of breath.    Cardiovascular: Negative for chest pain, palpitations and leg swelling.   Gastrointestinal: Positive  "for abdominal pain (\"discomfort\" - reflux? see HPI). Negative for abdominal distention, anal bleeding, constipation, diarrhea and nausea.   Endocrine: Negative for cold intolerance and heat intolerance.   Genitourinary: Negative for dysuria and hematuria.   Musculoskeletal: Positive for neck pain (see HPI). Negative for arthralgias, back pain and joint swelling.   Skin: Positive for wound (Low abdomen wound with vacuum suction in place). Negative for color change and rash.   Allergic/Immunologic: Positive for immunocompromised state (Secondary to adjuvant chemotherapy). Negative for environmental allergies and food allergies.   Neurological: Negative for dizziness, numbness and headaches.   Hematological: Negative for adenopathy. Does not bruise/bleed easily.   Psychiatric/Behavioral: Negative for agitation, behavioral problems, confusion and suicidal ideas.              Vitals:    11/24/20 0802   BP: 109/80   Pulse: 84   Resp: 16   Temp: 97.7 °F (36.5 °C)   TempSrc: Temporal   SpO2: 95%   Weight: 96.8 kg (213 lb 4.8 oz)   Height: 166 cm (65.35\")   PainSc: 0-No pain     Current Status 11/24/2020   ECOG score 0     PHYSICAL EXAM:    CONSTITUTIONAL:  Well-developed, well-nourished female, no distress, looks comfortable.  EYES:  Conjunctiva and lids unremarkable.  Pupils equal and round.  EARS:   Ears appear unremarkable.    NOSE:  Patient wears mask due to the pandemic coronavirus infection.   MOUTH: Same as above.  THROAT: Same as above.  RESPIRATORY:  Normal respiratory effort.  Lungs clear to auscultation bilaterally.  Mediport left chest wall benign.  CARDIOVASCULAR: Regular rhythm and rate.  Normal S1, S2.  No murmurs.   GASTROINTESTINAL: Abdomen no tender.  Ileostomy in the right lower abdomen.  Bowel sounds normal.  Nontender.  Wound VAC has been removed.  Patient has lower midline abdominal wound that is currently covered with clean dry dressing.  No surrounding erythema.  LYMPHATIC:  No cervical, " supraclavicular lymphadenopathy.  MUSCULOSKELETAL:  Unremarkable gait and station.  No cyanosis or clubbing.  No lower extremity edema.   SKIN:  Warm.  No rashes.   PSYCHIATRIC:  Normal judgment and insight.      RECENT LABS:    Lab Results   Component Value Date    WBC 5.80 11/24/2020    HGB 13.9 11/24/2020    HCT 43.3 11/24/2020    MCV 95.2 11/24/2020     11/24/2020     Lab Results   Component Value Date    NEUTROABS 4.11 11/24/2020     Glucose   Date Value Ref Range Status   11/24/2020 118 74 - 124 mg/dL Final     BUN   Date Value Ref Range Status   11/24/2020 13 6 - 20 mg/dL Final     Creatinine   Date Value Ref Range Status   11/24/2020 0.75 0.60 - 1.10 mg/dL Final   09/09/2020 0.60 0.60 - 1.30 mg/dL Final     Comment:     Serial Number: [id number]:  [id number]     Sodium   Date Value Ref Range Status   11/24/2020 138 134 - 145 mmol/L Final     Potassium   Date Value Ref Range Status   11/24/2020 4.8 (H) 3.5 - 4.7 mmol/L Final     Chloride   Date Value Ref Range Status   11/24/2020 103 98 - 107 mmol/L Final     CO2   Date Value Ref Range Status   11/24/2020 28.9 22.0 - 29.0 mmol/L Final     Calcium   Date Value Ref Range Status   11/24/2020 9.2 8.5 - 10.2 mg/dL Final     Total Protein   Date Value Ref Range Status   11/24/2020 7.1 6.3 - 8.0 g/dL Final     Albumin   Date Value Ref Range Status   11/24/2020 3.80 3.50 - 5.20 g/dL Final     ALT (SGPT)   Date Value Ref Range Status   11/24/2020 34 (H) 0 - 33 U/L Final     AST (SGOT)   Date Value Ref Range Status   11/24/2020 21 0 - 32 U/L Final     Alkaline Phosphatase   Date Value Ref Range Status   11/24/2020 111 38 - 116 U/L Final     Total Bilirubin   Date Value Ref Range Status   11/24/2020 0.2 0.2 - 1.2 mg/dL Final     eGFR Non  Amer   Date Value Ref Range Status   11/24/2020 85 >60 mL/min/1.73 Final     BUN/Creatinine Ratio   Date Value Ref Range Status   11/24/2020 17.3 7.3 - 30.0 Final     Anion Gap   Date Value Ref Range Status   11/24/2020  6.1 5.0 - 15.0 mmol/L Final     Lab Results   Component Value Date    CEA 1.32 09/16/2020     IMAGING:  CT ANGIOGRAM 11/16/2020   FINDINGS: The pulmonary arteries are well opacified with intravenous  contrast.There is no convincing filling defect to suggest pulmonary  artery thromboembolism. Trace pericardial effusion. No pleural effusion  is seen. No pathological mediastinal lymphadenopathy. The central  airways are patent without endobronchial lesion. 5 mm left lower lobe  lung nodule somewhat decreased in the interim from prior imaging. An  anterior right upper lobe nodule seen on image 46 significant interim  change in size. Additional nodules are seen within the left upper lobe  on image 57, 73, right middle lobe on image 93 mild discoid atelectatic  changes are seen particularly within the right lung base.  No new pulmonary nodules are seen. A left chest wall port has its tip at  the cavoatrial junction.     The visualized upper abdomen demonstrates changes from prior  cholecystectomy mild diffuse hepatic steatosis is suspected.    IMPRESSION:  No convincing evidence of pulmonary artery thromboembolism    CT Cervical Spine:  IMPRESSION:  1. The images are very grainy from C5 down to the superior endplate of  T2 thoracic level and limits evaluation of bony detail. There is  reversal of the normal cervical lordosis centered at C3-4 but overall  the cervical spine CT is within normal limits with no disc herniation,  canal or foraminal narrowing identified. No obvious lytic or blastic  lesions seen in the cervical spine with no CT evidence of metastatic  disease to the cervical spine. The etiology of the right trapezius pain  is not established on this exam. An MRI of the cervical and thoracic  spine with and without contrast could be obtained for a more  comprehensive assessment, if clinically indicated.       Assessment/Plan      ASSESSMENT:   1.  Rectal cancer, rectal ultrasound examination reported T3N0  disease without lymphadenopathy.   · CT scan of chest, abdomen and pelvis reported no lymphadenopathy in the abdomen, pelvis or chest. She does have small pulmonary nodule 6-7 mm and 2 mm with indeterminate feature. There is no solid evidence of metastatic disease otherwise.   · Based on the CT scan and rectal ultrasound imaging studies, this patient had stage IIA (T3N0M0) disease.    · She had PET scan examination on 8/8/2019 which reported a hypermetabolic right upper lobe pulmonary nodule 6 mm with SUV 5.6.  This is suspicious for metastatic disease however it is too small to be biopsied.  This patient may have stage IV disease.   · She initiated concurrent radiation with continuous 5-FU on 8/12/2019.  · Patient finished concurrent chemoradiation therapy.  · Patient underwent surgical resection of the rectal tumor and diverting loop ileostomy on 12/2/2019 with Dr. Ye.  Pathology evaluation reported residual T3 disease, with 1 out of 14 lymph nodes positive for malignancy.  Certainly this patient has at least stage IIIb rectal cancer (T3 N1 M0?)  · On 1/22/2020 adjuvant chemotherapy FOLFOX 6 regimen initiated.    · On 2/5/2022 cycle #2 was delayed secondary to neutropenia.  She was given 3 days of Granix.   · Emend added with cycle 3.  With improved nausea control  · Continuing home Granix x3 days following 5-FU disconnect  · 3/11/2020 due for cycle 4, however, she is experiencing progressive abdominal pain and occasional fevers.   · CT scan performed on 3/13/2020 reveals no evidence of progressive or recurrent disease.  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  · Patient hospitalized 4/24-4/26/20 after cycle 5 of chemotherapy with acute UTI.  CT abdomen/pelvis noting fluid collection in the presacral region having diminished in size compared to CT dated 3/13/2020.  Patient was evaluated by Dr. Ye while in the hospital with further plans to evaluate possible colovaginal fistula following completion of  chemotherapy.  Patient did respond to IV antibiotics and discharged home on oral cefdinir.  · 4/29/2020 cycle 6 of FOLFOX.  Urinary symptoms have resolved   · Patient seen in the [facility] ED on 5/2/2020 for what was suspected to be an allergic reaction.  She called our service on Saturday explaining that she was experiencing hand and face swelling.  She was instructed to proceed to the emergency department at which time she was assessed and prescribed a Medrol Dosepak.  She had just completed her 5-FU and was unhooked on Friday, 5/1/2020.  Her symptoms have resolved in the office on assessment, 5/5/2020.  · The patient had grade 3 hand-foot syndrome based on symptomology.  Patient had cycle #8 of 5-FU on 5/13/2020. Due to her symptoms and poor tolerance to the 5-FU, her treatment dose will be reduced 50% for oxaliplatin and infusional 5-FU.  Bolus 5-FU will be discontinued..  · On 5/13/2020  We discussed further treatment options pending the scan results.  If she has indeed residual disease or metastatic disease, we will switch her to irinotecan plus Avastin or anti-EGFR monoclonal antibody.  She will need a further molecular testing of her tumor samples in that case.  · On 5/21/2020 patient had a PET scan and it showed no evidence of of metastatic disease in the neck, chest, abdomen or pelvis.  There was fluid accumulation/abscess.   · On 5/27/2020 I discussed with the patient that we can discontinue chemotherapy at this time.  She will follow-up with Dr. Ye to discuss a possibility to reverse the ileostomy.  We likely will obtain anther PET scan in 3 to 4 months for reassessment.    · Patient was seen by Dr. Ye on 6/19/2019 for evaluation and to discuss possible take down of her ileostomy.  She is scheduled to have a gastrografin enema on 7/2/2020 to evaluate for a fistula, and then a colonoscopy on 7/15/2020, both done by Dr. Ye.  She states that based on the above imaging and procedure findings, she  may have a more extensive surgery done or just have her ileostomy reversed, which would likely be done in August 2020.  This will all be coordinated under the care of Dr. Ye.     · I reviewed scan results with the patient on 9/16/2020 for the most recent CT scan from 09/09/2020 and also compared it to her previous PET scan examination from 05/21/2020 and also the original PET scan from 08/09/2019.  The original PET scan there is a small right upper lobe pulmonary nodule 6 mm with SUV 5.6. So in the 05/2020 PET scan the nodule is still there but activity seems much less and this most recent CT scan examination also documented the preexisting small pulmonary nodule however there is a new left lower lobe pulmonary nodule 8 mm in size and I shared with the patient today this nodule is suspicious for metastatic disease. Laboratory studies reported minimal CEA level 1.32 on 09/09/2020. Liver function panel was only marginally abnormal with the ALT 45, the remaining is normal. However laboratory study today showed worsening AST 55, ALT 95 and alkaline phosphatase 143 but is still normal, total bilirubin 0.3.   · So I discussed with the patient on 9/16/2020 recommended to have repeat PET scan examination for assessment because the left lower lobe pulmonary nodule is too small to be biopsied, and if the PET scan reports hypermetabolic lesion, I recommended to have stereotactic radiation therapy. Explained to patient that she is not a really good candidate to have thoracotomy for resection because she still has unhealed wound in the abdomen. I think the stereotactic radiation therapy will be a more feasible choice.  · Laboratory study on 9/16/2020 reported worsening liver function panel with both elevated AST, ALT and also slightly worsened alkaline phosphatase despite having normal total bilirubin. I recommended to repeat laboratory study for reevaluation. The only new medication she has in the past several days is  Augmentin but otherwise no change of condition. She denies any recent viral infection. We will monitor this.   · PET scan examination obtained on 9/18/2020 showed metastatic disease.  It reported a few hypermetabolic pulmonary nodules, and some new small pulmonary nodules, all of them highly suspicious for metastatic disease.  She also has hypermetabolic activity in the abdomen and pelvic area which could be related to scar tissue from her recent surgery versus metastatic disease.  Nevertheless overall picture fits with metastatic cancer.  · I discussed with the patient on 9/20/2020, we reviewed the PET scan images together, and I recommended to have systematic chemotherapy, I do not think stereotactic reading therapy to the hypermetabolic lesions in the lungs warranted at this time because even those will be treated with reading therapy, she will still need systematic chemotherapy.  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  I did discuss with the patient using anti-EGFR monoclonal antibody versus anti-VEGF monoclonal antibody.     · Palliative chemotherapy cycle#1 FOLFIRI was started on 10/13/2020.  No bolus 5-FU given considering previous poor tolerance.  Genetic studies still pending.   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    · Discussed with the patient 10/27/2020, because of the K-saskia mutation, she is not a candidate for anti-EGFR monoclonal antibody such as Erbitux or vectibix.  She could be a candidate for anti-VEGF monoclonal antibody, however because of active wound, she is not a candidate right now at this moment.  · Patient seen in the ED for acute right neck pain on 11/16/2020.  CT angiogram as well as CT of the cervical spine performed with no acute findings but notably one of her pulmonary nodules, left upper lobe, somewhat decreased in size.  Patient given prednisone pack.  Further  details below.  · Patient due today for cycle #4 FOLFIRI.  Plan repeat PET scan after cycle 6.    2 .  Pulmonary nodule, hypermetabolic on PET scan.   · Her original PET scan examination from 8/8/2019 reported a small 8 mm right upper apex pulmonary nodule but hypermetabolic SUV 5.1.  That was too small to be biopsied, however there was always a possibility of metastatic disease considering that high activity despite a such a small nodule.  · Her chest CT scan examination from 3/13/2020 reported this shrank to 6 mm.    · PET scan examination on 5/21/2020 reported no metabolic activity at this residual nodule.  This needs to be monitored.    · PET scan examination 9/18/2020 reported couple of hypermetabolic pulmonary nodules, besides a few extra small pulmonary nodules.  Those are highly suspicious for metastatic disease.      3.   Pulmonary emboli with background of positive factor V Leiden mutation   · The patient has heterozygous factor V Leiden mutation and therefore was on low-dose anticoagulation with Lovenox, 40 mg daily given her increased risk of thrombosis with MediPort and GI malignancy.    · Nevertheless this patient was found to having pulmonary emboli on 9/20/2019 despite low-dose Lovenox.  She had a brief hospitalization in late September 2019, she was started on full dose Xarelto anticoagulation per protocol.    · The patient continues on Xarelto and is tolerating this well.  No issues with bleeding.    4.  This patient also has strong family history for malignancy the patient had appointment with genetic counseling on September 3, 2019.  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    5.  Mild anemia, improved since her surgery.    · She also has a history of iron deficiency.  Iron studies reveal a saturation of just 10%.  She was started on oral iron but was unable to tolerate due to GI side effects.   · Status post Injectafer 2/5/2020 and 2/12/2020   · Hemoglobin today 13.9.    6.  Peripheral  neuropathy secondary to chemotherapy.    · This is mild involving bilateral hands and feet as reported on 9/16/2020.   · Will avoid chemotherapy with oxaliplatin.  · Stable mild neuropathy as of 11/10/2020    7.  History of hand-foot syndrome grade 3.    · It become so significant after cycle #7 FOLFOX treatment.  Discussed with patient on 5/13/2020, will discontinue the bolus 5-FU dose, and also decrease 50% of the infusion dose through the pump.   · We also use Medrol Dosepak for possible recurrent symptomology.  She responded this well.   · Her hand-foot syndrome improved.  · Under current treatment with FOLFIRI, patient not receiving 5-FU bolus.  No recurrent issues.    8.  Hyperlacrimation and mild scleral irritation related to 5-FU.  She has been taking steroid eyedrops.  This has improved her hyperlacrimation.  · Not currently an issue.  Continue to monitor with 5-FU.      9.  Abnormal liver function panel.  · Improved liver function panel 9/18/2020.  This is probably related to her recent infection.  · She has normalized AST, alk phosphatase, and a much improved only marginally elevated ALT 35 on 10/13/2020.    · Normalized liver function panel on 10/27/2020.    · Normal liver panel on 11/10/2020.  Continue to monitor.    10.  Intermittent leukocytopenia secondary to chemotherapy.   · WBC currently normal at 5.8, ANC 4.1.  Continue to monitor.  Consider G-CSF if neutropenia becomes an ongoing issue.      11.  Depression.  Patient seen by JULIET Davenport on 11/9/2020.  She was started on mirtazapine.  Lexapro was discontinued.  This has definitely improved her mood with the patient feeling overall much better.  She is sleeping better.  Appetite is improved with her actually eating more than she wants to.  She plans to continue follow-up with supportive oncology.    12.  Right neck/shoulder pain occurring twice now.  Patient reports that this occurred with both cycle #2 and cycle #3.  She did not mention  it with cycle #2.  She did mention it was cycle #3 and per review of the EMR nursing note it was during irinotecan infusion.  Pain then subsided.  However it returned 5 days later with the patient going to the ED on 11/16/2020.  As imaging details are noted above, no acute findings were found.  Etiology remains unclear.  Patient's Mediport is in the opposite side of her chest.  She was given a prednisone taper which she continues at this time.  I do still wonder if she is having some kind of weird reaction to irinotecan.  I did encourage the patient to speak up if she has recurrent pain during treatment today.  She verbalized understanding.    13.  Intermittent upper abdominal discomfort.  This improves with eating.  After further discussion with the patient she also notes 3 nights of increased reflux when laying down.  We discussed her symptoms could be related to increase stomach acid or potential ulcer.  She is currently taking Pepcid 20 mg daily.  I instructed her to add Prilosec 20 mg daily.  If helpful we can prescribe this ongoing.      PLAN:  1. Proceed ahead with cycle #4 FOLFIRI treatment today.  Continue with no bolus dose of 5-FU due to previous hand-foot syndrome.    2. Patient will be unhooked from 5-FU followed by home health due to the Thanksgiving holiday. Notes faxed to Josee ADAMS.  3. Patient will continue prednisone taper as prescribed in the ED per details above.  Patient to call with recurrent neck pain.  4. Patient is positive for K-s[place]ia mutation by comprehensive NGS from [facility] laboratory.  Not a candidate for anti-EGFR monoclonal antibody.  Potential candidate for anti-VEGF monoclonal antibody, but due to continued wound healing, plan to delay initiation of Avastin.    5. Continue Xarelto 20 mg daily.   6. Continue Pepcid 20 mg daily but add Prilosec for increased reflux and GI discomfort.  7. Continue wound care with dressing changes per home health once a week.  8. Continue  mirtazapine.  Maintain follow-up with supportive oncology.  9. Patient will follow up with Dr. Ye on 12/7/2020.  10. Patient will return in 2 weeks for evaluation by Dr. Nguyen with routine labs, prior to cycle #5 FOLFIRI chemotherapy.  Consider possibly addition of Avastin if abdominal wound is completely closed at that point.  Plan to have PET scan after cycle #6 treatment.  11. I have asked the patient to call the office should she experience new or worsening symptoms.     This patient is on drug therapy requiring intensive monitoring for toxicity.  We did more than just assess side effects of treatment today.  Recent imaging from ED reviewed in detail today and again discussed with patient.  Unusual neck pain addressed and plan of care going forward.    Gaby Cruz, APRN  11/24/20        CC:  Deepika Vela III NP-C   MD Perla Bowers MD

## 2020-11-24 ENCOUNTER — OFFICE VISIT (OUTPATIENT)
Dept: ONCOLOGY | Facility: CLINIC | Age: 40
End: 2020-11-24

## 2020-11-24 ENCOUNTER — INFUSION (OUTPATIENT)
Dept: ONCOLOGY | Facility: HOSPITAL | Age: 40
End: 2020-11-24

## 2020-11-24 VITALS
DIASTOLIC BLOOD PRESSURE: 80 MMHG | WEIGHT: 213.3 LBS | HEART RATE: 84 BPM | HEIGHT: 65 IN | OXYGEN SATURATION: 95 % | TEMPERATURE: 97.7 F | BODY MASS INDEX: 35.54 KG/M2 | SYSTOLIC BLOOD PRESSURE: 109 MMHG | RESPIRATION RATE: 16 BRPM

## 2020-11-24 DIAGNOSIS — F41.9 ANXIETY: ICD-10-CM

## 2020-11-24 DIAGNOSIS — R91.8 PULMONARY NODULES: ICD-10-CM

## 2020-11-24 DIAGNOSIS — M54.2 NECK PAIN: ICD-10-CM

## 2020-11-24 DIAGNOSIS — Z79.899 HIGH RISK MEDICATION USE: ICD-10-CM

## 2020-11-24 DIAGNOSIS — C20 ADENOCARCINOMA OF RECTUM (HCC): Primary | ICD-10-CM

## 2020-11-24 DIAGNOSIS — Z79.01 CHRONIC ANTICOAGULATION: Chronic | ICD-10-CM

## 2020-11-24 DIAGNOSIS — K21.9 GASTROESOPHAGEAL REFLUX DISEASE, UNSPECIFIED WHETHER ESOPHAGITIS PRESENT: ICD-10-CM

## 2020-11-24 DIAGNOSIS — C20 ADENOCARCINOMA OF RECTUM (HCC): ICD-10-CM

## 2020-11-24 LAB
ALBUMIN SERPL-MCNC: 3.8 G/DL (ref 3.5–5.2)
ALBUMIN/GLOB SERPL: 1.2 G/DL (ref 1.1–2.4)
ALP SERPL-CCNC: 111 U/L (ref 38–116)
ALT SERPL W P-5'-P-CCNC: 34 U/L (ref 0–33)
ANION GAP SERPL CALCULATED.3IONS-SCNC: 6.1 MMOL/L (ref 5–15)
AST SERPL-CCNC: 21 U/L (ref 0–32)
BASOPHILS # BLD AUTO: 0.01 10*3/MM3 (ref 0–0.2)
BASOPHILS NFR BLD AUTO: 0.2 % (ref 0–1.5)
BILIRUB SERPL-MCNC: 0.2 MG/DL (ref 0.2–1.2)
BUN SERPL-MCNC: 13 MG/DL (ref 6–20)
BUN/CREAT SERPL: 17.3 (ref 7.3–30)
CALCIUM SPEC-SCNC: 9.2 MG/DL (ref 8.5–10.2)
CHLORIDE SERPL-SCNC: 103 MMOL/L (ref 98–107)
CO2 SERPL-SCNC: 28.9 MMOL/L (ref 22–29)
CREAT SERPL-MCNC: 0.75 MG/DL (ref 0.6–1.1)
DEPRECATED RDW RBC AUTO: 61 FL (ref 37–54)
EOSINOPHIL # BLD AUTO: 0.06 10*3/MM3 (ref 0–0.4)
EOSINOPHIL NFR BLD AUTO: 1 % (ref 0.3–6.2)
ERYTHROCYTE [DISTWIDTH] IN BLOOD BY AUTOMATED COUNT: 17.7 % (ref 12.3–15.4)
GFR SERPL CREATININE-BSD FRML MDRD: 85 ML/MIN/1.73
GLOBULIN UR ELPH-MCNC: 3.3 GM/DL (ref 1.8–3.5)
GLUCOSE SERPL-MCNC: 118 MG/DL (ref 74–124)
HCT VFR BLD AUTO: 43.3 % (ref 34–46.6)
HGB BLD-MCNC: 13.9 G/DL (ref 12–15.9)
IMM GRANULOCYTES # BLD AUTO: 0.03 10*3/MM3 (ref 0–0.05)
IMM GRANULOCYTES NFR BLD AUTO: 0.5 % (ref 0–0.5)
LYMPHOCYTES # BLD AUTO: 1.11 10*3/MM3 (ref 0.7–3.1)
LYMPHOCYTES NFR BLD AUTO: 19.1 % (ref 19.6–45.3)
MCH RBC QN AUTO: 30.5 PG (ref 26.6–33)
MCHC RBC AUTO-ENTMCNC: 32.1 G/DL (ref 31.5–35.7)
MCV RBC AUTO: 95.2 FL (ref 79–97)
MONOCYTES # BLD AUTO: 0.48 10*3/MM3 (ref 0.1–0.9)
MONOCYTES NFR BLD AUTO: 8.3 % (ref 5–12)
NEUTROPHILS NFR BLD AUTO: 4.11 10*3/MM3 (ref 1.7–7)
NEUTROPHILS NFR BLD AUTO: 70.9 % (ref 42.7–76)
NRBC BLD AUTO-RTO: 0 /100 WBC (ref 0–0.2)
PLATELET # BLD AUTO: 265 10*3/MM3 (ref 140–450)
PMV BLD AUTO: 8.1 FL (ref 6–12)
POTASSIUM SERPL-SCNC: 4.8 MMOL/L (ref 3.5–4.7)
PROT SERPL-MCNC: 7.1 G/DL (ref 6.3–8)
RBC # BLD AUTO: 4.55 10*6/MM3 (ref 3.77–5.28)
SODIUM SERPL-SCNC: 138 MMOL/L (ref 134–145)
WBC # BLD AUTO: 5.8 10*3/MM3 (ref 3.4–10.8)

## 2020-11-24 PROCEDURE — 85025 COMPLETE CBC W/AUTO DIFF WBC: CPT

## 2020-11-24 PROCEDURE — 25010000002 LEUCOVORIN CALCIUM PER 50 MG: Performed by: NURSE PRACTITIONER

## 2020-11-24 PROCEDURE — 25010000002 ATROPINE PER 0.01 MG: Performed by: NURSE PRACTITIONER

## 2020-11-24 PROCEDURE — 96416 CHEMO PROLONG INFUSE W/PUMP: CPT

## 2020-11-24 PROCEDURE — 99215 OFFICE O/P EST HI 40 MIN: CPT | Performed by: NURSE PRACTITIONER

## 2020-11-24 PROCEDURE — 96367 TX/PROPH/DG ADDL SEQ IV INF: CPT

## 2020-11-24 PROCEDURE — 96368 THER/DIAG CONCURRENT INF: CPT

## 2020-11-24 PROCEDURE — 25010000002 DEXAMETHASONE SODIUM PHOSPHATE 100 MG/10ML SOLUTION: Performed by: NURSE PRACTITIONER

## 2020-11-24 PROCEDURE — 25010000002 FOSAPREPITANT PER 1 MG: Performed by: NURSE PRACTITIONER

## 2020-11-24 PROCEDURE — 25010000002 PALONOSETRON PER 25 MCG: Performed by: NURSE PRACTITIONER

## 2020-11-24 PROCEDURE — 96375 TX/PRO/DX INJ NEW DRUG ADDON: CPT

## 2020-11-24 PROCEDURE — 80053 COMPREHEN METABOLIC PANEL: CPT

## 2020-11-24 PROCEDURE — 96415 CHEMO IV INFUSION ADDL HR: CPT

## 2020-11-24 PROCEDURE — 96413 CHEMO IV INFUSION 1 HR: CPT

## 2020-11-24 PROCEDURE — 25010000002 FLUOROURACIL PER 500 MG: Performed by: NURSE PRACTITIONER

## 2020-11-24 PROCEDURE — 25010000002 IRINOTECAN PER 20 MG: Performed by: NURSE PRACTITIONER

## 2020-11-24 RX ORDER — SODIUM CHLORIDE 9 MG/ML
250 INJECTION, SOLUTION INTRAVENOUS ONCE
Status: COMPLETED | OUTPATIENT
Start: 2020-11-24 | End: 2020-11-24

## 2020-11-24 RX ORDER — ATROPINE SULFATE 1 MG/ML
0.25 INJECTION, SOLUTION INTRAMUSCULAR; INTRAVENOUS; SUBCUTANEOUS
Status: CANCELLED | OUTPATIENT
Start: 2020-11-24

## 2020-11-24 RX ORDER — PALONOSETRON 0.05 MG/ML
0.25 INJECTION, SOLUTION INTRAVENOUS ONCE
Status: CANCELLED | OUTPATIENT
Start: 2020-11-24

## 2020-11-24 RX ORDER — ATROPINE SULFATE 0.4 MG/ML
0.25 AMPUL (ML) INJECTION
Status: DISCONTINUED | OUTPATIENT
Start: 2020-11-24 | End: 2020-11-24 | Stop reason: HOSPADM

## 2020-11-24 RX ORDER — SODIUM CHLORIDE 9 MG/ML
250 INJECTION, SOLUTION INTRAVENOUS ONCE
Status: CANCELLED | OUTPATIENT
Start: 2020-11-24

## 2020-11-24 RX ORDER — PALONOSETRON 0.05 MG/ML
0.25 INJECTION, SOLUTION INTRAVENOUS ONCE
Status: COMPLETED | OUTPATIENT
Start: 2020-11-24 | End: 2020-11-24

## 2020-11-24 RX ADMIN — DEXTROSE MONOHYDRATE 365 MG: 50 INJECTION, SOLUTION INTRAVENOUS at 09:39

## 2020-11-24 RX ADMIN — DEXAMETHASONE SODIUM PHOSPHATE 12 MG: 10 INJECTION, SOLUTION INTRAMUSCULAR; INTRAVENOUS at 09:19

## 2020-11-24 RX ADMIN — PALONOSETRON 0.25 MG: 0.05 INJECTION, SOLUTION INTRAVENOUS at 08:46

## 2020-11-24 RX ADMIN — FLUOROURACIL 4850 MG: 50 INJECTION, SOLUTION INTRAVENOUS at 11:20

## 2020-11-24 RX ADMIN — SODIUM CHLORIDE 250 ML: 9 INJECTION, SOLUTION INTRAVENOUS at 08:46

## 2020-11-24 RX ADMIN — SODIUM CHLORIDE 810 MG: 900 INJECTION, SOLUTION INTRAVENOUS at 09:39

## 2020-11-24 RX ADMIN — SODIUM CHLORIDE 100 ML: 9 INJECTION, SOLUTION INTRAVENOUS at 08:46

## 2020-11-24 RX ADMIN — ATROPINE SULFATE 0.25 MG: 0.4 INJECTION, SOLUTION INTRAMUSCULAR; INTRAVENOUS; SUBCUTANEOUS at 09:36

## 2020-11-30 ENCOUNTER — TELEPHONE (OUTPATIENT)
Dept: ONCOLOGY | Facility: CLINIC | Age: 40
End: 2020-11-30

## 2020-11-30 DIAGNOSIS — M54.2 NECK PAIN: Primary | ICD-10-CM

## 2020-11-30 NOTE — TELEPHONE ENCOUNTER
D/W Daksha Cruz NP. Per Daksha pt will have a MRI of her Cervical and Thoracic spine. Orders placed and message sent to scheduling.

## 2020-11-30 NOTE — TELEPHONE ENCOUNTER
Pt had went to ER because of shoulder pain in Nov.  They put her on prednisone and it helped.  She had another chemo treatment on the 24th and now shoulder is hurting again.  ER told her if it came back she might need to get an MRI right shoulder.  Can she get it ordered she we will have results when she comes in on 12-8.

## 2020-12-01 ENCOUNTER — HOSPITAL ENCOUNTER (OUTPATIENT)
Dept: MRI IMAGING | Facility: HOSPITAL | Age: 40
Discharge: HOME | End: 2020-12-01

## 2020-12-01 DIAGNOSIS — M54.2 NECK PAIN: ICD-10-CM

## 2020-12-01 PROCEDURE — 72156 MRI NECK SPINE W/O & W/DYE: CPT

## 2020-12-01 PROCEDURE — 72157 MRI CHEST SPINE W/O & W/DYE: CPT

## 2020-12-01 PROCEDURE — 0 GADOBENATE DIMEGLUMINE 529 MG/ML SOLUTION: Performed by: NURSE PRACTITIONER

## 2020-12-01 PROCEDURE — A9577 INJ MULTIHANCE: HCPCS | Performed by: NURSE PRACTITIONER

## 2020-12-01 RX ADMIN — GADOBENATE DIMEGLUMINE 20 ML: 529 INJECTION, SOLUTION INTRAVENOUS at 14:12

## 2020-12-02 ENCOUNTER — APPOINTMENT (OUTPATIENT)
Dept: MRI IMAGING | Facility: HOSPITAL | Age: 40
End: 2020-12-02

## 2020-12-02 ENCOUNTER — DOCUMENTATION (OUTPATIENT)
Dept: ONCOLOGY | Facility: CLINIC | Age: 40
End: 2020-12-02

## 2020-12-02 NOTE — PROGRESS NOTES
I called patient today to review results of MRI of the thoracic and cervical spine.  Degenerative changes noted but no evidence of metastatic disease.  Patient is thankful and relieved to know this.  However her symptoms remain unusual.  Unclear if this is related to irinotecan as she has experienced pain during infusion at some points but other times not until 1 to 2 days after treatment.  I have communicated with Dr. Nguyen who will see the patient next week prior to cycle #5 of FOLFIRI.

## 2020-12-07 ENCOUNTER — OFFICE VISIT (OUTPATIENT)
Dept: SURGERY | Facility: CLINIC | Age: 40
End: 2020-12-07

## 2020-12-07 ENCOUNTER — OFFICE VISIT (OUTPATIENT)
Dept: PSYCHIATRY | Facility: HOSPITAL | Age: 40
End: 2020-12-07

## 2020-12-07 VITALS
HEART RATE: 107 BPM | WEIGHT: 223.7 LBS | TEMPERATURE: 97.9 F | SYSTOLIC BLOOD PRESSURE: 120 MMHG | HEIGHT: 66 IN | OXYGEN SATURATION: 94 % | BODY MASS INDEX: 35.95 KG/M2 | DIASTOLIC BLOOD PRESSURE: 84 MMHG

## 2020-12-07 DIAGNOSIS — F41.1 GENERALIZED ANXIETY DISORDER: Primary | ICD-10-CM

## 2020-12-07 DIAGNOSIS — F33.1 MAJOR DEPRESSIVE DISORDER, RECURRENT EPISODE, MODERATE (HCC): ICD-10-CM

## 2020-12-07 DIAGNOSIS — Z93.2 ILEOSTOMY PRESENT (HCC): Primary | ICD-10-CM

## 2020-12-07 PROCEDURE — 99024 POSTOP FOLLOW-UP VISIT: CPT | Performed by: COLON & RECTAL SURGERY

## 2020-12-07 PROCEDURE — 99215 OFFICE O/P EST HI 40 MIN: CPT | Performed by: NURSE PRACTITIONER

## 2020-12-07 RX ORDER — MIRTAZAPINE 15 MG/1
15 TABLET, ORALLY DISINTEGRATING ORAL NIGHTLY
Qty: 90 TABLET | Refills: 0 | Status: SHIPPED | OUTPATIENT
Start: 2020-12-07 | End: 2021-01-15

## 2020-12-07 NOTE — PROGRESS NOTES
[facility] Oncology Services  In Person Session    Subjective  Patient ID: Carole Weaver is a 40 y.o. female is seen face to face in the [facility] ([facility].    Patient presents to clinic alert, oriented, and engaged in conversation.  Patient is open, communicative, with appropriate mood and affect.  Tearful at times.   is present, affect somewhat constricted.  Relationship history and roles reviewed.  Concerns shared.  Quality of life goals visited.  Patient continues with positive response to treatment; plans to continue with scans upcoming in January to guide next steps.  Patient continues to work toward realistic expectations while hoping for extended survival.  Finds the lack of control and uncertainty is especially stressful.  Mood and anxiety reportedly improved with transition from SSRI to ODT Remeron.  Patient does report approximately 10 pounds of weight gain within the last 3 weeks, although questions whether this can be directly related to Remeron versus steroids versus fluid accumulation.  Agreeable to continuing at this time.    Medications Reviewed:  Remeron 15 mg nightly    Diagnoses and all orders for this visit:    1. Generalized anxiety disorder (Primary)    2. Major depressive disorder, recurrent episode, moderate (CMS/HCC)    Plan of Care  Patient with improved symptoms of anxiety and depression alongside newly added Remeron.  Will continue as written.    Patient is seen in presence of  for couples therapy session surrounding diagnosis, advanced care planning discussion, and effective communication strategies.      Follow-up scheduled in clinic in 4 weeks, in person.    Total time spent  face to face with patient: 60 minutes.   45 minutes spent with patient and  reviewing differences in coping patterns, individual concerns and goals for relationship.  Reviewed catastrophic thinking, avoidance, and differences in grief.  Explored strategies of permission  to speak individual needs as well as have moments of normalcy.  Reviewed supportive oncology and community offerings for personal and family support.  Explored advanced care planning, living well, and quality of life goals.

## 2020-12-07 NOTE — PROGRESS NOTES
"Carole Weaver is a 40 y.o. female in for follow up of Ileostomy present (CMS/[facility])  Abd wound not draining.   Having appointment with Dr. Patrick martinez.  Plans of scans in a few weeks    /84 (BP Location: Left arm, Patient Position: Sitting, Cuff Size: Large Adult)   Pulse 107   Temp 97.9 °F (36.6 °C)   Ht 167.6 cm (66\")   Wt 101 kg (223 lb 11.2 oz)   SpO2 94%   Breastfeeding No   BMI 36.11 kg/m²   Body mass index is 36.11 kg/m².      PE:  Physical Exam  Exam conducted with a chaperone present.   Constitutional:       General: She is not in acute distress.     Appearance: She is well-developed.   HENT:      Head: Normocephalic and atraumatic.   Abdominal:      General: There is no distension.      Palpations: Abdomen is soft.      Comments: Midline abd incision with a small 1 x 1 cm open area with a pink, granulated base.    Musculoskeletal: Normal range of motion.   Neurological:      Mental Status: She is alert.   Psychiatric:         Thought Content: Thought content normal.           Assessment:   1. Ileostomy present (CMS/[facility])         Plan:  Can dismiss HH when ready. Midline abd incision with pink, granulated base. Wound was packed with dry gauze strip and covered with DSD. RTC one month for wound assessment.        "

## 2020-12-08 ENCOUNTER — PREP FOR SURGERY (OUTPATIENT)
Dept: OTHER | Facility: HOSPITAL | Age: 40
End: 2020-12-08

## 2020-12-08 ENCOUNTER — OFFICE VISIT (OUTPATIENT)
Dept: ONCOLOGY | Facility: CLINIC | Age: 40
End: 2020-12-08

## 2020-12-08 ENCOUNTER — INFUSION (OUTPATIENT)
Dept: ONCOLOGY | Facility: HOSPITAL | Age: 40
End: 2020-12-08

## 2020-12-08 ENCOUNTER — HOSPITAL ENCOUNTER (OUTPATIENT)
Dept: GENERAL RADIOLOGY | Facility: HOSPITAL | Age: 40
Discharge: HOME | End: 2020-12-08
Admitting: INTERNAL MEDICINE

## 2020-12-08 VITALS
TEMPERATURE: 98.4 F | HEART RATE: 109 BPM | BODY MASS INDEX: 36.79 KG/M2 | HEIGHT: 65 IN | OXYGEN SATURATION: 98 % | SYSTOLIC BLOOD PRESSURE: 137 MMHG | DIASTOLIC BLOOD PRESSURE: 85 MMHG | RESPIRATION RATE: 16 BRPM | WEIGHT: 220.8 LBS

## 2020-12-08 DIAGNOSIS — C20 ADENOCARCINOMA OF RECTUM (HCC): Primary | ICD-10-CM

## 2020-12-08 DIAGNOSIS — C20 ADENOCARCINOMA OF RECTUM (HCC): ICD-10-CM

## 2020-12-08 DIAGNOSIS — Z79.01 ANTICOAGULATION ADEQUATE: ICD-10-CM

## 2020-12-08 DIAGNOSIS — I26.99 OTHER PULMONARY EMBOLISM WITHOUT ACUTE COR PULMONALE, UNSPECIFIED CHRONICITY (HCC): ICD-10-CM

## 2020-12-08 DIAGNOSIS — C78.00 MALIGNANT NEOPLASM METASTATIC TO LUNG, UNSPECIFIED LATERALITY (HCC): ICD-10-CM

## 2020-12-08 DIAGNOSIS — D68.51 HETEROZYGOUS FACTOR V LEIDEN MUTATION (HCC): ICD-10-CM

## 2020-12-08 DIAGNOSIS — G62.0 DRUG-INDUCED PERIPHERAL NEUROPATHY (HCC): ICD-10-CM

## 2020-12-08 DIAGNOSIS — L27.1 HAND FOOT SYNDROME: ICD-10-CM

## 2020-12-08 LAB
ALBUMIN SERPL-MCNC: 3.7 G/DL (ref 3.5–5.2)
ALBUMIN/GLOB SERPL: 1.1 G/DL (ref 1.1–2.4)
ALP SERPL-CCNC: 106 U/L (ref 38–116)
ALT SERPL W P-5'-P-CCNC: 34 U/L (ref 0–33)
ANION GAP SERPL CALCULATED.3IONS-SCNC: 8.2 MMOL/L (ref 5–15)
AST SERPL-CCNC: 21 U/L (ref 0–32)
BASOPHILS # BLD AUTO: 0.02 10*3/MM3 (ref 0–0.2)
BASOPHILS NFR BLD AUTO: 0.4 % (ref 0–1.5)
BILIRUB SERPL-MCNC: 0.2 MG/DL (ref 0.2–1.2)
BUN SERPL-MCNC: 11 MG/DL (ref 6–20)
BUN/CREAT SERPL: 13.6 (ref 7.3–30)
CALCIUM SPEC-SCNC: 9.4 MG/DL (ref 8.5–10.2)
CHLORIDE SERPL-SCNC: 106 MMOL/L (ref 98–107)
CO2 SERPL-SCNC: 26.8 MMOL/L (ref 22–29)
CREAT SERPL-MCNC: 0.81 MG/DL (ref 0.6–1.1)
DEPRECATED RDW RBC AUTO: 66.9 FL (ref 37–54)
EOSINOPHIL # BLD AUTO: 0.11 10*3/MM3 (ref 0–0.4)
EOSINOPHIL NFR BLD AUTO: 2.3 % (ref 0.3–6.2)
ERYTHROCYTE [DISTWIDTH] IN BLOOD BY AUTOMATED COUNT: 19.3 % (ref 12.3–15.4)
GFR SERPL CREATININE-BSD FRML MDRD: 78 ML/MIN/1.73
GLOBULIN UR ELPH-MCNC: 3.3 GM/DL (ref 1.8–3.5)
GLUCOSE SERPL-MCNC: 112 MG/DL (ref 74–124)
HCT VFR BLD AUTO: 42.4 % (ref 34–46.6)
HGB BLD-MCNC: 13.6 G/DL (ref 12–15.9)
IMM GRANULOCYTES # BLD AUTO: 0.02 10*3/MM3 (ref 0–0.05)
IMM GRANULOCYTES NFR BLD AUTO: 0.4 % (ref 0–0.5)
LYMPHOCYTES # BLD AUTO: 0.71 10*3/MM3 (ref 0.7–3.1)
LYMPHOCYTES NFR BLD AUTO: 14.8 % (ref 19.6–45.3)
MCH RBC QN AUTO: 31 PG (ref 26.6–33)
MCHC RBC AUTO-ENTMCNC: 32.1 G/DL (ref 31.5–35.7)
MCV RBC AUTO: 96.6 FL (ref 79–97)
MONOCYTES # BLD AUTO: 0.42 10*3/MM3 (ref 0.1–0.9)
MONOCYTES NFR BLD AUTO: 8.8 % (ref 5–12)
NEUTROPHILS NFR BLD AUTO: 3.51 10*3/MM3 (ref 1.7–7)
NEUTROPHILS NFR BLD AUTO: 73.3 % (ref 42.7–76)
NRBC BLD AUTO-RTO: 0 /100 WBC (ref 0–0.2)
PLATELET # BLD AUTO: 180 10*3/MM3 (ref 140–450)
PMV BLD AUTO: 7.8 FL (ref 6–12)
POTASSIUM SERPL-SCNC: 4.4 MMOL/L (ref 3.5–4.7)
PROT SERPL-MCNC: 7 G/DL (ref 6.3–8)
RBC # BLD AUTO: 4.39 10*6/MM3 (ref 3.77–5.28)
SODIUM SERPL-SCNC: 141 MMOL/L (ref 134–145)
WBC # BLD AUTO: 4.79 10*3/MM3 (ref 3.4–10.8)

## 2020-12-08 PROCEDURE — 99215 OFFICE O/P EST HI 40 MIN: CPT | Performed by: INTERNAL MEDICINE

## 2020-12-08 PROCEDURE — 96365 THER/PROPH/DIAG IV INF INIT: CPT

## 2020-12-08 PROCEDURE — 85025 COMPLETE CBC W/AUTO DIFF WBC: CPT

## 2020-12-08 PROCEDURE — 25010000002 PALONOSETRON PER 25 MCG: Performed by: INTERNAL MEDICINE

## 2020-12-08 PROCEDURE — 25010000002 FOSAPREPITANT PER 1 MG: Performed by: INTERNAL MEDICINE

## 2020-12-08 PROCEDURE — 25010000002 FLUOROURACIL PER 500 MG: Performed by: INTERNAL MEDICINE

## 2020-12-08 PROCEDURE — 25010000002 LEUCOVORIN CALCIUM PER 50 MG: Performed by: INTERNAL MEDICINE

## 2020-12-08 PROCEDURE — 96416 CHEMO PROLONG INFUSE W/PUMP: CPT

## 2020-12-08 PROCEDURE — 25010000002 DEXAMETHASONE SODIUM PHOSPHATE 100 MG/10ML SOLUTION: Performed by: INTERNAL MEDICINE

## 2020-12-08 PROCEDURE — 96367 TX/PROPH/DG ADDL SEQ IV INF: CPT

## 2020-12-08 PROCEDURE — 96375 TX/PRO/DX INJ NEW DRUG ADDON: CPT

## 2020-12-08 PROCEDURE — 36598 INJ W/FLUOR EVAL CV DEVICE: CPT

## 2020-12-08 PROCEDURE — 80053 COMPREHEN METABOLIC PANEL: CPT

## 2020-12-08 RX ORDER — SODIUM CHLORIDE 9 MG/ML
250 INJECTION, SOLUTION INTRAVENOUS ONCE
Status: CANCELLED | OUTPATIENT
Start: 2020-12-08

## 2020-12-08 RX ORDER — CEFAZOLIN SODIUM 2 G/100ML
2 INJECTION, SOLUTION INTRAVENOUS ONCE
Status: CANCELLED | OUTPATIENT
Start: 2020-12-18 | End: 2020-12-08

## 2020-12-08 RX ORDER — PALONOSETRON 0.05 MG/ML
0.25 INJECTION, SOLUTION INTRAVENOUS ONCE
Status: COMPLETED | OUTPATIENT
Start: 2020-12-08 | End: 2020-12-08

## 2020-12-08 RX ORDER — PALONOSETRON 0.05 MG/ML
0.25 INJECTION, SOLUTION INTRAVENOUS ONCE
Status: CANCELLED | OUTPATIENT
Start: 2020-12-08

## 2020-12-08 RX ORDER — SODIUM CHLORIDE 9 MG/ML
250 INJECTION, SOLUTION INTRAVENOUS ONCE
Status: COMPLETED | OUTPATIENT
Start: 2020-12-08 | End: 2020-12-08

## 2020-12-08 RX ADMIN — SODIUM CHLORIDE 810 MG: 900 INJECTION, SOLUTION INTRAVENOUS at 15:13

## 2020-12-08 RX ADMIN — PALONOSETRON 0.25 MG: 0.05 INJECTION, SOLUTION INTRAVENOUS at 09:15

## 2020-12-08 RX ADMIN — DEXAMETHASONE SODIUM PHOSPHATE 12 MG: 10 INJECTION, SOLUTION INTRAMUSCULAR; INTRAVENOUS at 14:49

## 2020-12-08 RX ADMIN — FLUOROURACIL 4850 MG: 50 INJECTION, SOLUTION INTRAVENOUS at 16:00

## 2020-12-08 RX ADMIN — SODIUM CHLORIDE 250 ML: 9 INJECTION, SOLUTION INTRAVENOUS at 09:13

## 2020-12-08 RX ADMIN — SODIUM CHLORIDE 100 ML: 9 INJECTION, SOLUTION INTRAVENOUS at 09:17

## 2020-12-08 NOTE — PROGRESS NOTES
PT C/O R SHOULDER PAIN AND BURNING AFTER INFUSION OF EMEND. PT'S PORT DOES NOT GIVE BLD RTN AND SHE HASN'T HAD A PORT DYE STUDY SINCE LAST JAN. STOPPED INFUSION AND D/W DR. HUNT. SENDING PT TO hospitals TO GET DYE STUDY. PORT S/L AND PT SENT ON.     PER DR. HUNT OK TO USE PT'S PORT. PT HAD DYE STUDY AND THEY SAID EVERYTHING IS FLOWING THRU AND NOTHING IS LEAKING. PT STILL C/O R SHOULDER PAIN DURING INFUSION. DR. HUNT IS GOING TO SEE ABOUT PT GETTING A NEW PORT PLACED. PER DR. HUNT OK TO CONNECT PT TO [facility] OF 5FU. PT WILL MONITOR BALL AND MAKE SURE IT IS INFUSING AND SHE WILL CALL IF PAIN INC'S.

## 2020-12-08 NOTE — PROGRESS NOTES
REASON FOR FOLLOW UP:     1. Rectal cancer, rectal ultrasound examination in July 2019 reported T3N0 disease without lymphadenopathy. She does have small pulmonary nodule 6-7 mm and 2 mm with indeterminate feature. There is no solid evidence of metastatic disease otherwise. Patient has stage IIA (T3N0M0) disease.  2. The patient is heterozygous factor V Leiden, was on prophylactic anticoagulation with Lovenox 40 mg daily given her increased risk of thrombosis with MediPort and GI malignancy.   3. PET scan on 8/8/2019 reported an 8 mm hypermetabolic right upper lobe pulmonary nodule, which is suspicious for metastatic as well.    4. Patient had a PowerPort placement on the left upper chest by Dr. Joseph on 8/9/2019.  5. Patient was started on concurrent infusional 5-FU chemoradiation therapy on 8/12/2019, with planned complete surgical resection and further adjuvant chemotherapy with intention to cure the disease.   6. Patient underwent surgical resection of the primary rectal cancer by Dr. Ye on 12/2/2019.  Pathology evaluation reported residual T3N1 disease stage IIIb.  7. Diarrhea related to therapy and radiation.   8. Patient was started cycle 1 day 1 of adjuvant FOLFOX 6 on 1/23/2020.  9. On 2/5/2020 FOLFOX 6 cycle 2 delayed secondary to neutropenia.  Patient was given 3 days of Granix injection.  After cycle #2 we planned 3 days of Granix with each cycle.   10. Patient also intolerant of oral iron.  Patient received 2 doses of IV injectafer on 02/05/2020 and 02/12/2020.   11. 02/12/2020 Proceed with cycle #2 of FOLFOX 6 with 3 days of Granix.  12. On 3/11/2020 cycle 4 postponed secondary to abdominal pain and occasional low-grade fevers.  CT scans ordered.  13. Cycle #4 resumed after CT scan revealed no evidence of disease.  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 Ye. patient has no fever.  Continue to observe.   14. Grade 3  hand-foot syndrome secondary to 5-FU after cycle #7 FOLFOX 6 chemotherapy, prompting ER visit.  Also has worsening peripheral neuropathy.   15. Cycle #8 FOLFOX 6 was given on 5/13/2020, with 50% dose reduction for both 5-FU and oxaliplatin, and also examination of bolus 5-FU.   16. PET scan examination on 5/21/2020 reported no evidence of metastatic disease in the chest abdomen pelvis.  Stable 6 mm RUL pulmonary nodule.  17. On 5/27/2020, I discussed with the patient that we can discontinue chemotherapy at this time.   18. Patient had a surgical procedure for low anterior colon resection, coloanal anastomosis on 8/3/2020.  19. CT scan for chest abdomen pelvis on 9/9/2020 reported a new 8 mm noncalcified pulmonary nodule in the left lower lobe of the lung.  Otherwise stable right upper lobe 6 mm pulmonary nodule, and no evidence of disease recurrence in the abdomen or pelvis.  20. PET scan examination on 9/18/2020 reported multiple hypermetabolic small pulmonary nodules/ new pulmonary nodules.   21. Patient was started on pelvic chemotherapy with FOLFIRI regimen on 10/13/2020.   22. 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 It reported tumor mutation burden 5 Muts/Mb, microsatellite stable, TP53 mutation R282W, and others.    HISTORY OF PRESENT ILLNESS:  The patient is a 40 y.o. female with the above medical history who returns today for follow-up, due for cycle #5 of palliative FOLFIRI.    The patient reports that recently she had significant issues with pain in the right shoulder area during and after IV chemotherapy. This started about 4 weeks ago from cycle #3 FOLFIRI treatment and she had developed pain in the right shoulder area. The pain was getting worse a few days after chemotherapy and she actually ended up in the ER on 11/16/2020 about 6 days after chemotherapy. The patient had imaging studies at that time with CT for cervical spine as well as chest  angiogram study. It showed no evidence for pulmonary emboli. The left lower lobe pulmonary nodule seems slightly better and the right upper lobe pulmonary nodule seems stable in size. The cervical spine overall was within normal limits with no disc herniation or foraminal narrowing. There was no apparent evidence of lytic or blastic lesions.     The patient returned to the clinic 2 weeks ago for cycle 4 chemotherapy. She reported that this pain started about 2 days after the chemotherapy, and it lasted for about 8-9 days just resolved for the past couple of days. She reports this pain in the past week was actually worse than 3 weeks ago which prompted her ER visit, so this time she did not go to the ER. She was taking pain medication. Eventually the pain subsided.    There was a thought whether irinotecan caused the unusual pain in the right shoulder area. She previously tolerated 5FU without a problem so irinotecan becomes the primary suspect.    I discussed with the patient, since the CT scan did show small improvement of the small metastatic disease in the chest, I would feel comfortable to not giving her irinotecan today, only giving her 5FU treatment. She of course is very concerned about that, but I think it is reasonable we need to figure out which medication exactly is causing her symptoms. This is an unusual side effect, but nevertheless, we need to figure it out.     ADDENDUM: The patient subsequently was seen by a nurse and started IV hydration and premedication. She was given emend and the patient started having significant pain again in the right shoulder area, back side of her right shoulder. This pain was excruciating. The nurse recommended to have Port-A-Cath dye study. This was done today and apparently the IV dye was able to be pushed slowly into the port. However, there is no blood that can be withdrawn. The patient returned to the clinic to restart IV hydration and chemotherapy. During the phase  of normal saline infusion, she started having pain again. Our nurse also noticed that the dripping of fluid is very slow. Now with all things considered, I think maybe her symptoms are all because of the malfunctioning of the Port-A-Cath. I contacted the surgeon, Dr. Joseph, requesting replacement of the Port-A-Cath as soon as possible. The patient herself though she does wish to proceed ahead with 5FU chemotherapy today and continue for the next 48 hours because she is concerned that missing chemotherapy will put her at a disadvantage.    Subsequently went arrange patient to have MRI of the cervical spine and thoracic spine done on 12/1/2020-study was negative for malignancy.  It reported degenerative disease.     Patient reports she had a vacuum pump removed yesterday by Dr. Ye.  She currently still has a small opening in the lower abdomen, and I did take a look at this area, it is subcentimeter, with dry gauze packed.  No evidence for infection.     Patient had reevaluation through supportive oncology services by Sameer Krissy JULIET yesterday on 12/7/2020.  Patient is very happy with the service.  She was recently started on mirtazapine 15 mg nightly for depression.  Lexapro was also discontinued.  Patient reports that her mood is overall much improved and she is sleeping better.  She notes that she is a little bit more hungry with the mirtazapine and therefore gaining a bit of weight which frustrates her but she and the end is happy to feel better overall mentally.        Past Medical History:   Diagnosis Date   • Abdominopelvic abscess (CMS/HCC) 08/12/2020    ADMITTED TO [facility]   • Abnormal Pap smear of cervix 02/02/1998    JULIUS I   • Anemia in pregnancy    • Anxiety    • Bilateral epiphora 04/2020   • Bilateral hand swelling 05/02/2020    SEEN AT [facility] ER   • Cervical lymphadenitis 06/06/2012    SEEN AT [facility] ER   • Chemotherapy induced neutropenia (CMS/HCC) 04/2020   • Chemotherapy-induced nausea 02/2020   •  Chemotherapy-induced thrombocytopenia 2020   • Chronic diarrhea    • Colon polyps     FOLLOWED BY DR. GERONIMO ESPARZA   • Cystitis 2020    WITH DEHYDRATION, ADMITTED TO [facility]   • Cystitis 10/27/2020   • Drug-induced peripheral neuropathy (CMS/HCC) 2020   • Fistula of intestine    • GERD (gastroesophageal reflux disease)    • Hand foot syndrome 2020   • Heart murmur     IN CHILDHOOD   • Hematochezia    • Hemorrhoids    • Heterozygous factor V Leiden mutation (CMS/[facility])     DX 2019   • History of anemia    • History of chemotherapy     FOLLOWED BY DR. ALEXANDRU HUNT   • History of gestational diabetes    • History of pre-eclampsia    • History of radiation therapy     FOLLOWED BY DR. JAVON LEWIS   • History of TB skin testing 2009    TB Skin Test   • HPV in female    • Hypokalemia 2019   • Hypomagnesemia 2019   • IBS (irritable bowel syndrome)    • Ileostomy in place (CMS/[facility])     FOLLOWED BY DR. GERONIMO ESPARZA   • Infected insect bite of neck 2016    SEEN AT [facility]   • Kidney stones 2007    SEEN AT [facility] ER   • Lump of right breast     SWOLLEN LYMPH NODE   • Lung cancer (CMS/HCC) 2020    METASTATIC LUNG CANCER   • Monopolar depression (CMS/[facility])    • On anticoagulant therapy    • Perirectal abscess 2020   • PIH (pregnancy induced hypertension)    • Pulmonary embolism without acute cor pulmonale (CMS/HCC) 09/20/2019    X 3; ADMITTED TO [facility]   • Pulmonary nodule, right 2020   • Rectal cancer (CMS/HCC) 2019    STAGE IIA, INVASIVE MODERATELY DIFFERENTIATED ADENOCARCINOMA, CHEMO AND XRT FINISHED 2019   • Right shoulder pain 2020    SEEN AT [facility] ER   • Right ureteral stone 10/01/2019    SEEN AT [facility] ER   • SAB (spontaneous ) 1996     A2-1 INDUCED   • Sciatica    • Sepsis due to cellulitis (CMS/HCC) 2002    LEFT GREAT TOE, ADMITTED TO [facility]   • Tachycardia 2020   • Urinary urgency 2020     Past Surgical History:    Procedure Laterality Date   • CHOLECYSTECTOMY N/A 10/10/2003    LAPAROSCOPIC WITH CHOLANGIOGRAM, DR. JAMEY TALAVERA AT [facility]   • COLON RESECTION N/A 12/2/2019    Procedure: laparoscopic low anterior colon resection with mobilization of splenic flexure and diverting loop ileostomy: ERAS;  Surgeon: Padmaja Esparza MD;  Location: [facility] OR;  Service: General   • COLON RESECTION N/A 8/3/2020    Procedure: LOW ANTERIOR COLON RESECTION, COLOANAL ANASTOMOSIS, MOBILIZATION SPLENIC FLEXURE;  Surgeon: Padmaja Esparza MD;  Location: [facility] MAIN OR;  Service: General;  Laterality: N/A;   • COLONOSCOPY N/A 7/15/2020    PATENT ANASTAMOSIS IN MID RECTUM, RESCOPE IN 1 YR, DR. PADMAJA ESPARZA AT [facility]   • COLONOSCOPY W/ POLYPECTOMY N/A 07/08/2019    15 MM TUBULOVILLOUS ADENOMA POLYP IN HEPATIC FLEXURE, 20 MMTUBULOVILLOUS ADENOMA WITH HIGH GRADE DYSPLASIA IN RECTOSIGMOID, 4 CM MASS IN MID RECTUM, PATH: INVASIVE MODERATELY DIFFERENTIATED ADENOCARCINOMA, DR. JENNIFER LI AT [facility]   • DILATATION AND EVACUATION N/A 2009   • ENDOSCOPY N/A 07/08/2019    LA GRADE A ESOPHAGITIS, GASTRITIS, ALL BIOPSIES BENIGN, DR. JENNIFRE LI AT [facility]   • INCISION AND DRAINAGE PERIRECTAL ABSCESS N/A 8/14/2020    Procedure: INCISION AND DRAINAGE OF retrorectal dehiscence abcess with drain placement and irrigation;  Surgeon: Padmaja Esparza MD;  Location: [facility] OR;  Service: General;  Laterality: N/A;   • PAP SMEAR N/A 01/24/2014   • SIGMOIDOSCOPY N/A 7/24/2019    INFILTRATIVE PARTIALLY OBSTRUCTING LARGE RECTAL CANCER, AREA TATOOED, DR. PADMAJA ESPARZA AT [facility]   • SIGMOIDOSCOPY N/A 11/23/2019    AN ULCERATED PARTIALLY OBSTRUCTING MASS IN MID RECTUM, AREA TATTOOED, DR. PADMAJA ESPARZA AT [facility]   • TRANSRECTAL ULTRASOUND N/A 7/24/2019    Procedure: ULTRASOUND TRANSRECTAL;  Surgeon: Padmaja Esparza MD;  Location: [facility] ENDOSCOPY;  Service: General   • URETEROSCOPY LASER LITHOTRIPSY WITH STENT INSERTION Right 10/30/2019      ESTUARDO BEASLEY AT [place]   • VAGINAL DELIVERY N/A 09/18/1998   • VENOUS ACCESS DEVICE (PORT) INSERTION Left 8/9/2019    Procedure: INSERTION VENOUS ACCESS DEVICE;  Surgeon: Sj Joseph MD;  Location: [facility] OR [facility];  Service: General   • WISDOM TOOTH EXTRACTION Bilateral 1993       HEMATOLOGIC/ONCOLOGIC HISTORY:      The patient reports she has intermittent rectal bleeding and urgency, mucous with her stool, starting sometime in 2016. At that time she was referred to GI service, and was diagnosed of irritable bowel syndrome. Nevertheless she had worsening urgency for bowel movement, and had a couple of incidents losing control of stool. She was recently seen by Roland Thorpe MD again and had colonoscopy and EGD exam on 07/08/2019. She was found having a circumferential rectal mass. According to the procedure note, the patient had a fungating circumferential bleeding 4 cm mass in the middle rectum, at distance between 13 cm and 17 cm from the anus. Mass was causing partial obstruction. There were also colon polyps found at the hepatic flexure and also at the rectosigmoid junction 23 cm from the anus. Both were resected and retrieved. EGD examination reported grade A esophagitis at the GE junction and patchy discontinuous erythema and aggravation of the mucosa without bleeding in the stomach body. There is normal mucosa of the duodenum. Pathology evaluation from this procedure reported moderately differentiated adenocarcinoma involving the rectal mass. The rectal sigmoid junction polyp was tubular/tubulovillous adenoma with high grade dysplasia negative for invasive malignancy. The hepatic flexure polyp was also tubular/tubulovillous adenoma negative for high grade dysplasia or malignancy. The biopsy from the esophagus reports squamocolumnar mucosa with inflammatory changes suggestive of mild reflux esophagitis, negative for interstitial metaplasia. Gastric biopsy was benign and duodenal biopsy was also benign.  There is no mention of H-pylori.     Patient was subsequently referred to colorectal surgeon Padmaja Ye MD for further evaluation. The patient had CT scan examination for chest, abdomen and pelvis requested by Dr. Ye and were done on 07/13/2019. The study reported no evidence of lymphadenopathy in the abdomen and pelvis. There was wall thickening of the rectosigmoid junction. Normal spleen, pancreas, adrenal glands and kidneys. There was fatty liver infiltration but no focal lymphatic lesions. There was a small 6-7 mm noncalcified nodule in the right upper lobe and a tiny 3 mm subpleural nodule in the right middle lobe. No mediastinal or hilar lymphadenopathy.     Dr. Ye performed staging rectal ultrasound examination. This study reported tumor penetrating through the muscularis propria as T3 disease; there were no lymph nodes identified.    She had a hospitalization in late September 2019 because of newly discovered pulmonary emboli.  She was on prophylactic Lovenox prior to the incident of PE.  Now she is on full dose Xarelto anticoagulation.  Patient reports no further chest pain dyspnea.  She denies bleeding or bruising.  During her hospitalization, she was seen by Dr. Ye, who plans to have surgery 8 to 12 weeks after finishing radiation therapy.  She finished her radiation on 9/19/2019.     I noticed patient also presented to the emergency room on 10/1/2019 complaining of right flank area pain.  I reviewed the images studies and indeed she has a very small 1 to 2 mm obstructing kidney stone in the UV junction.  Patient is still symptomatic with some pains and dysuria.  She denies fever sweating or chills.    Laboratory study on 10/7/2019 reported normal CBC including hemoglobin 13.1, platelets 301,000, WBC 6170 and ANC 4900 lymphocytes 590 monocytes 480.      The nurse reported malfunction of port-a-catheter on 10/7/2019.  Port study on 10/8/2019 showed fibrin sheath around the distal aspect of the  Mediport catheter in the SVC. This does not appear to hinder injection, but does prevent aspiration at this time.    Patient underwent surgical resection of the colon on 12/2/2019 with Dr. Ye.  Pathology evaluation reported residual T3 disease, also 1 out of 14 lymph nodes positive for malignancy.  So this patient in either had at least stage IIIb disease (T3 N1 M0?).      Adjuvant chemotherapy FOLFOX 6 will be started on 1/22/2020.  Laboratory study reported iron saturation 10%, free iron 31 TIBC 319 and ferritin 168.  Her hemoglobin was 11.8, WBC 5600, and platelets 347,000.    Patient was here on 02/12/2020 for cycle 2 of her FOLFOX.  This is delayed x1 week secondary to neutropenia.  The patient ultimately received 3 days worth of Neupogen with recovery of her blood counts.  Of note, the patient struggled with significant nausea without any episodes of vomiting following cycle #1 of chemotherapy resulting in significant weight loss.  She is up 12 pounds over the last week as her appetite has normalized.  We will add Emend to her care plan.    She is having several loose stools per her colostomy and has been hesitant to take Imodium due to prior history of constipation.  I encouraged her to try this with a maximum of 8 tablets/day.  She denies any infectious symptoms including fevers or chills.  No excess bleeding or bruising noted.  She had the expected cold sensitivity related to the Oxaliplatin for about 3 days following treatment.    Labs from 02/12/2020 demonstrated total white blood cell count of 5.14, ANC of 3.06, hemoglobin of 11.2, platelets of 211,000.  She was found to be iron deficient last week and is intolerant to oral iron secondary to GI distress.  For this reason, she initiated IV iron therapy with Injectafer last week.  She had received her second dose 02/12/2020    Patient has normalized hemoglobin 12.2 on 2/26/2020.    She reported on 5/5/2020 she had a recent visit to the emergency  department for what was suspected to be an allergic reaction.  She called our on-call service on Saturday with reports of hand and face swelling.  She was instructed to proceed to the emergency department at which time she was assessed and prescribed a Medrol Dosepak.  She had just completed her 5-FU and was unhooked on Friday, 5/3/2020.  Her symptoms have improved.  She does report persistent hyperpigmentation and mild swelling of the palms of the hands but this is much improved.  It was her right hand specifically that was swollen.  Her facial swelling has resolved.  She continues on Cefdinir nd since has 1 day remaining, she was prescribed cefdinir for a UTI requiring hospitalization at the end of April.  Reports no new symptoms.  Her labs are stable.  She is scheduled for treatment again.    Patient states at this time she is not tolerating her chemotherapy well.     Patient seen in the emergency department on 5/2/2020 for what was suspected to be an allergic reaction.  She called our on-call service on Saturday explaining that she was experiencing hand and face swelling.  She was instructed to proceed to the emergency department at which time she was assessed and prescribed a Medrol Dosepak.  She had just completed her 5-FU and was unhooked on Friday, 5/1/2020.      She reports since her ED visit on 5/2/2020 her symptoms have not improved. Her hands and feet were swollen upon presenting to the ED and she could barely make a fist. She states that she feels so swollen she is not able to stand it. She states that her skin on the hands and feet are peeling extensively as well, besides changing color to more dark.     She also reports significant fatigue after her first week of the 5-FU treatment but she expected this side effect. She also notices significant increase in her neuropathy to the point that she is not able to even walk around in her home without her house slippers due to irritation from her rugs. She  denies and associated nausea or vomiting at this time. She also has episodes of epistaxis every day after the chemotherapy cycle #7.  She does report working full-time during the week of chemotherapy.    Laboratory studies, 5/13/2020, show moderate thrombocytopenia platelets 123,000, low normal WBC 4140 including ANC 2720 and normal hemoglobin 13.4.  Because significant hand-foot syndrome, will decrease both 5-FU and oxaliplatin by 50%, and eliminate bolus dose of 5-FU.    On 5/21/2020 patient had a PET scan performed which showed no convincing evidence of residual disease in abdomen or pelvis, no metastatic disease within the chest or neck.     Cycle #8 FOLFOX 6 was given on 5/13/2020, with 50% dose reduction for both 5-FU and oxaliplatin, and also examination of bolus 5-FU.  She states on 5/27/2020 that with the recent reduction of the chemotherapy she feels significantly better. She has more energy and is able to do her daily routine.      PET scan examination on 5/21/2020 reported no evidence of metastatic disease in chest abdomen pelvis.  There was a stable 6 mm right upper lobe pulmonary nodule.    Laboratory studies on 5/27/2020 showed mild leukocytopenia WBC 3720 but a normal ANC 2250 and lymphocytes 630.  Normal platelets 163,000 and hemoglobin 12.6.  Chemistry lab reported normal renal function, liver function panel and a electrolytes, elevated glucose 164.    Laboratory studies 6/24/2020, showed normal hemoglobin 13.4 but macrocytosis .9.  Normal platelets 210,000 and WBC 5870.  She had normal CMP.     Patient last time was here in late June 2020.  Since that time she had surgical procedure for low anterior colon resection, coloanal anastomosis on 8/3/2020.  She later developed a perirectal abscess, required surgical drainage on 8/14/2020.  She was discharged on 8/27/2020.    Patient reports to me she still has lower abdominal wall vacuum suction in place.  She denies fever sweating chills.   Performance status is ECOG 1.  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  She does have visiting nurse come to the home for wound care.    CT scan for chest abdomen pelvis on 9/9/2020 reported a new 8 mm noncalcified pulmonary nodule in the left lower lobe.  Otherwise stable right upper lobe 6 mm pulmonary nodule, and no evidence of disease recurrence in the abdomen or pelvis.     Laboratory study on 9/16/2020 reported elevated AST 55, ALT 95, alk phosphatase 143 but normal total bilirubin 0.3.  Chemistry lab otherwise unremarkable.  She has completed normal CBC.      Because of the abnormal CT scan examination for chest abdomen pelvis on 9/9/2020 reported a new 8 mm noncalcified pulmonary nodule in the left lower lobe, we obtained a PET scan examination for further evaluation, which was done on 9/18/2020.  This study reported several pulmonary nodules, some of them are hypermetabolic, newly developed compared to previous PET scan in May 2020.  Certainly does highly suspicious for metastatic disease.  There are also hypermetabolic activity in the abdomen and pelvic area which is hard to differentiate from surgical scar versus metastatic malignancy.    Laboratory study on 10/13/2020 reported normal CBC with Hb 13.9, platelets 302,000 and WBC 5520 including ANC 3830.  Chemistry lab reported normalized AST 20, alk phosphatase 116 improved ALT 35, and maintains normal bilirubin, with normal electrolytes and liver function panel.     Patient was started on first cycle of palliative chemotherapy FOLFIRI on 10/13/2020.    MEDICATIONS    Current Outpatient Medications:   •  clonazePAM (KlonoPIN) 1 MG tablet, Take 1 tablet by mouth 3 (Three) Times a Day As Needed for Anxiety or Seizures., Disp: 90 tablet, Rfl: 0  •  dicyclomine (BENTYL) 20 MG tablet, TAKE 1 TABLET BY MOUTH EVERY 6 HOURS AS NEEDED, Disp: 120 tablet, Rfl: 2  •  diphenoxylate-atropine (LOMOTIL) 2.5-0.025 MG per tablet, Take 1 tablet  by mouth 4 (Four) Times a Day As Needed for Diarrhea., Disp: 120 tablet, Rfl: 1  •  famotidine (PEPCID) 20 MG tablet, TAKE 1 TABLET BY MOUTH TWICE A DAY (Patient taking differently: Take 20 mg by mouth Every Evening.), Disp: 180 tablet, Rfl: 1  •  HYDROcodone-acetaminophen (NORCO) 7.5-325 MG per tablet, Take 1 tablet by mouth Every 6 (Six) Hours As Needed for Moderate Pain ., Disp: 240 tablet, Rfl: 0  •  Loratadine (CLARITIN) 10 MG capsule, Take 10 mg by mouth Every Evening., Disp: , Rfl:   •  mirtazapine (REMERON SOL-TAB) 15 MG disintegrating tablet, Place 1 tablet on the tongue Every Night., Disp: 90 tablet, Rfl: 0  •  ondansetron (ZOFRAN) 8 MG tablet, Take 1 tablet by mouth 3 (Three) Times a Day As Needed for Nausea or Vomiting., Disp: 60 tablet, Rfl: 5  •  predniSONE (DELTASONE) 10 MG tablet, Take 4 tablets by mouth daily for 5 days then 2 tablets by mouth daily for 3 days then 1 tablet by mouth daily for 2 days, Disp: 28 tablet, Rfl: 0  •  prochlorperazine (COMPAZINE) 10 MG tablet, Take 1 tablet by mouth Every 6 (Six) Hours As Needed for Nausea or Vomiting., Disp: 30 tablet, Rfl: 2  •  rivaroxaban (Xarelto) 20 MG tablet, Take 1 tablet by mouth Daily., Disp: 90 tablet, Rfl: 3  •  Diclofenac Sodium (VOLTAREN) 1 % gel gel, Apply 4 g topically to the appropriate area as directed 3 (Three) Times a Day., Disp: 150 g, Rfl: 3  No current facility-administered medications for this visit.     Facility-Administered Medications Ordered in Other Visits:   •  fluorouracil (ADRUCIL) 4,850 mg, sodium chloride 0.9 % 143 mL 240 mL chemo infusion - FOR HOME USE, 2,400 mg/m2 (Treatment Plan Recorded), Intravenous, Once, Dong Nguyen MD PhD, 4,850 mg at 12/08/20 1600  •  iopamidol (ISOVUE-370) 76 % injection 50 mL, 50 mL, Intravenous, Once in imaging, Dong Nguyen MD PhD    ALLERGIES:   No Known Allergies    SOCIAL HISTORY:       Social History     Tobacco Use   • Smoking status: Heavy Tobacco Smoker     Packs/day: 1.00      "Years: 24.00     Pack years: 24.00     Types: Electronic Cigarette, Cigarettes   • Smokeless tobacco: Never Used   • Tobacco comment: LAST CIGARETTE 8/12/2020   Substance Use Topics   • Alcohol use: Not Currently   • Drug use: No         FAMILY HISTORY:   Mother has positive factor V Leiden mutation, although she did not have thrombosis, mother also is coronary disease with stenting, she also is occasional bruising.  Maternal grandmother had DVT, she had multiple surgical procedures.  Patient mother's half-brother had metastatic colon cancer at diagnosis in his 50s.  Maternal great aunt has breast cancer.  Patient will follow his skin cancer in his 60s, exclusive.    REVIEW OF SYSTEMS:  Review of Systems   Constitutional: Negative for activity change, appetite change, chills, diaphoresis, fatigue, fever and unexpected weight change.   HENT: Negative for congestion, hearing loss, mouth sores, nosebleeds and trouble swallowing.    Eyes: Negative for photophobia and visual disturbance.   Respiratory: Negative for cough, chest tightness and shortness of breath.    Cardiovascular: Negative for chest pain, palpitations and leg swelling.   Gastrointestinal: Positive for abdominal pain (\"discomfort\" - reflux? see HPI). Negative for abdominal distention, anal bleeding, constipation, diarrhea and nausea.   Endocrine: Negative for cold intolerance and heat intolerance.   Genitourinary: Negative for dysuria and hematuria.   Musculoskeletal: Positive for neck pain (see HPI). Negative for arthralgias, back pain and joint swelling.   Skin: Positive for wound (Low abdomen wound with vacuum suction in place). Negative for color change and rash.   Allergic/Immunologic: Positive for immunocompromised state (Secondary to adjuvant chemotherapy). Negative for environmental allergies and food allergies.   Neurological: Negative for dizziness, numbness and headaches.   Hematological: Negative for adenopathy. Does not bruise/bleed easily. " "  Psychiatric/Behavioral: Negative for agitation, behavioral problems, confusion and suicidal ideas.              Vitals:    12/08/20 0816   BP: 137/85   Pulse: 109   Resp: 16   Temp: 98.4 °F (36.9 °C)   SpO2: 98%   Weight: 100 kg (220 lb 12.8 oz)   Height: 166 cm (65.35\")   PainSc: 0-No pain     Current Status 12/8/2020   ECOG score 0     PHYSICAL EXAM:    CONSTITUTIONAL:  Well-developed, well-nourished female, no distress, looks comfortable.  EYES:  Conjunctiva and lids unremarkable.  Pupils equal and round.  EARS:   Ears appear unremarkable.    NOSE:  Patient wears mask due to the pandemic coronavirus infection.   MOUTH: Same as above.  THROAT: Same as above.  RESPIRATORY:  Normal respiratory effort.  Lungs clear to auscultation bilaterally.  Mediport left chest wall benign.  CARDIOVASCULAR: Regular rhythm and rate.  Normal S1, S2.  No murmurs.   GASTROINTESTINAL: Abdomen no tender.  Ileostomy in the right lower abdomen.  Bowel sounds normal.  Nontender.  Wound VAC has been removed.  Patient has lower midline abdominal wound that is currently covered with clean dry dressing.  No surrounding erythema.  LYMPHATIC:  No cervical, supraclavicular lymphadenopathy.  MUSCULOSKELETAL:  Unremarkable gait and station.  No cyanosis or clubbing.  No lower extremity edema.   SKIN:  Warm.  No rashes.   PSYCHIATRIC:  Normal judgment and insight.      RECENT LABS:    Lab Results   Component Value Date    WBC 4.79 12/08/2020    HGB 13.6 12/08/2020    HCT 42.4 12/08/2020    MCV 96.6 12/08/2020     12/08/2020     Lab Results   Component Value Date    NEUTROABS 3.51 12/08/2020     Glucose   Date Value Ref Range Status   12/08/2020 112 74 - 124 mg/dL Final     BUN   Date Value Ref Range Status   12/08/2020 11 6 - 20 mg/dL Final     Creatinine   Date Value Ref Range Status   12/08/2020 0.81 0.60 - 1.10 mg/dL Final   09/09/2020 0.60 0.60 - 1.30 mg/dL Final     Comment:     Serial Number: [id number]:  [id number]     Sodium   Date " Value Ref Range Status   12/08/2020 141 134 - 145 mmol/L Final     Potassium   Date Value Ref Range Status   12/08/2020 4.4 3.5 - 4.7 mmol/L Final     Chloride   Date Value Ref Range Status   12/08/2020 106 98 - 107 mmol/L Final     CO2   Date Value Ref Range Status   12/08/2020 26.8 22.0 - 29.0 mmol/L Final     Calcium   Date Value Ref Range Status   12/08/2020 9.4 8.5 - 10.2 mg/dL Final     Total Protein   Date Value Ref Range Status   12/08/2020 7.0 6.3 - 8.0 g/dL Final     Albumin   Date Value Ref Range Status   12/08/2020 3.70 3.50 - 5.20 g/dL Final     ALT (SGPT)   Date Value Ref Range Status   12/08/2020 34 (H) 0 - 33 U/L Final     AST (SGOT)   Date Value Ref Range Status   12/08/2020 21 0 - 32 U/L Final     Alkaline Phosphatase   Date Value Ref Range Status   12/08/2020 106 38 - 116 U/L Final     Total Bilirubin   Date Value Ref Range Status   12/08/2020 0.2 0.2 - 1.2 mg/dL Final     eGFR Non  Amer   Date Value Ref Range Status   12/08/2020 78 >60 mL/min/1.73 Final     BUN/Creatinine Ratio   Date Value Ref Range Status   12/08/2020 13.6 7.3 - 30.0 Final     Anion Gap   Date Value Ref Range Status   12/08/2020 8.2 5.0 - 15.0 mmol/L Final     Lab Results   Component Value Date    CEA 1.32 09/16/2020     IMAGING:  FLUOROSCOPIC GUIDED INJECTION OF LEFT MEDIPORT CATHETER FOR EVALUATION  OF PATENCY 12/8/2020     HISTORY: Evaluate Mediport patency.     The subclavian MediPort catheter was accessed in the standard fashion.  Iodinated contrast was injected. The catheter appears intact. The  catheter tip is seen in the SVC. There is slow flow of contrast through  the catheter however contrast does pass into the superior vena cava.     No blood could be aspirated. Small fibrin sheath is seen along the tip  of the catheter.     IMPRESSION:  Slow flow through the left subclavian MediPort catheter.  2. The catheter is patent with its tip in the SVC.  3. No blood could be aspirated.       2. MRI EXAMINATION OF THE  CERVICAL SPINE WITHOUT CONTRAST AND MRI  EXAMINATION OF THE THORACIC SPINE WITH AND WITHOUT CONTRAST 12/1/2020     HISTORY: Neck pain, back pain, rectal cancer.     COMPARISON: No prior MRI examination of the cervical or thoracic spine  is available for comparison.     MRI EXAMINATION OF THE CERVICAL SPINE WITH AND WITHOUT CONTRAST     FINDINGS:  There is an area of T2 hyperintensity involving the anterior  aspect of the C5 vertebral body on the sagittal T2 sequence measuring 7  mm in size. This area is decreased in signal intensity on the T1  sequence and after contrast administration there was mild enhancement  along the margins. Signal intensity within the cervical cord is normal  on the sagittal T2 sequence.     C2-3: There is no evidence of disc bulge or herniation.     C3-4: There is no evidence of disc bulge or herniation.     C4-5: There is a central disc bulge or protrusion which results in mild  canal stenosis. It mildly abuts and flattens the ventral surface of the  cord.     C5-6: A broad-based disc osteophyte complex is present which results in  mild flattening of the ventral surface of the thecal sac. On the axial  T1 precontrast sequence increased signal intensity is noted involving  the anterior aspect of the C5 vertebral body anteriorly and to the left  which corresponds to the area of T2 hyperintensity. This enhances after  contrast administration.     C6-7: There is no evidence of disc bulge or herniation.     C7-T1: There is no evidence of disc bulge or herniation.     IMPRESSION:  1. No evidence of focal herniation, cord signal abnormality or of  abnormal enhancement related to the cord. There is mild canal stenosis  secondary to a central disc bulge or protrusion at C4-5. This abuts and  mildly flattens the ventral surface of the cord.  2. There is enhancement involving the C5 vertebral body anteriorly and  to the left of midline. This is T2 hyperintense. It is subtly  hyperintense on the  sagittal T1 sequence however on the axial T1  sequence it is more evident to be T1 hyperintense consistent with a  hemangioma. No convincing metastatic disease is identified.  MRI EXAMINATION OF THE THORACIC SPINE WITH AND WITHOUT CONTRAST     FINDINGS:  Signal intensity within the cord is normal on the sagittal  and axial T2 sequences. There is disc desiccation from T5 to T7. There  is mild loss of disc height at T5-6 and T6-7 and prominence of the  posterior aspect of the disc and endplates evident on the sagittal  images at T6-7.     There is a mild left paracentral disc osteophyte complex present at T5-6  resulting in mild flattening of the anterior lateral aspect of the cord.  There is a larger central disc osteophyte complex present at T6-7 which  results in moderate canal stenosis and mild to moderate flattening of  the cord centrally. A hemangioma involving the lateral aspect of the T9  vertebral body is appreciated and a smaller hemangioma involving the  anterior aspect of the T5 vertebral body to the right is noted.     After contrast administration there was no evidence of abnormal  enhancement to suggest metastatic disease. The hemangioma enhances.     IMPRESSION:  1. Multilevel degenerative disease is noted as described in detail above  including a central focal disc osteophyte complex at T6-7 which abuts  and mildly flattens the cord centrally. A slightly less prominent but  broader based left paracentral disc osteophyte complex is present at  T5-6 resulting in flattening of the cord centrally and to the left.  There was no evidence of cord signal abnormality. See above.  2. No evidence of metastatic disease. A hemangioma involving T9 was  appreciated with enhancement. Smaller hemangioma involving T5 anteriorly  and to the right was also present.           CT ANGIOGRAM 11/16/2020   FINDINGS: The pulmonary arteries are well opacified with intravenous  contrast.There is no convincing filling defect to  suggest pulmonary  artery thromboembolism. Trace pericardial effusion. No pleural effusion  is seen. No pathological mediastinal lymphadenopathy. The central  airways are patent without endobronchial lesion. 5 mm left lower lobe  lung nodule somewhat decreased in the interim from prior imaging. An  anterior right upper lobe nodule seen on image 46 significant interim  change in size. Additional nodules are seen within the left upper lobe  on image 57, 73, right middle lobe on image 93 mild discoid atelectatic  changes are seen particularly within the right lung base.  No new pulmonary nodules are seen. A left chest wall port has its tip at  the cavoatrial junction.     The visualized upper abdomen demonstrates changes from prior  cholecystectomy mild diffuse hepatic steatosis is suspected.    IMPRESSION:  No convincing evidence of pulmonary artery thromboembolism    CT Cervical Spine:  IMPRESSION:  1. The images are very grainy from C5 down to the superior endplate of  T2 thoracic level and limits evaluation of bony detail. There is  reversal of the normal cervical lordosis centered at C3-4 but overall  the cervical spine CT is within normal limits with no disc herniation,  canal or foraminal narrowing identified. No obvious lytic or blastic  lesions seen in the cervical spine with no CT evidence of metastatic  disease to the cervical spine. The etiology of the right trapezius pain  is not established on this exam. An MRI of the cervical and thoracic  spine with and without contrast could be obtained for a more  comprehensive assessment, if clinically indicated.       Assessment/Plan      ASSESSMENT:   1.  Rectal cancer, rectal ultrasound examination reported T3N0 disease without lymphadenopathy.   · CT scan of chest, abdomen and pelvis reported no lymphadenopathy in the abdomen, pelvis or chest. She does have small pulmonary nodule 6-7 mm and 2 mm with indeterminate feature. There is no solid evidence of metastatic  disease otherwise.   · Based on the CT scan and rectal ultrasound imaging studies, this patient had stage IIA (T3N0M0) disease.    · She had PET scan examination on 8/8/2019 which reported a hypermetabolic right upper lobe pulmonary nodule 6 mm with SUV 5.6.  This is suspicious for metastatic disease however it is too small to be biopsied.  This patient may have stage IV disease.   · She initiated concurrent radiation with continuous 5-FU on 8/12/2019.  · Patient finished concurrent chemoradiation therapy.  · Patient underwent surgical resection of the rectal tumor and diverting loop ileostomy on 12/2/2019 with Dr. Ye.  Pathology evaluation reported residual T3 disease, with 1 out of 14 lymph nodes positive for malignancy.  Certainly this patient has at least stage IIIb rectal cancer (T3 N1 M0?)  · On 1/22/2020 adjuvant chemotherapy FOLFOX 6 regimen initiated.    · On 2/5/2022 cycle #2 was delayed secondary to neutropenia.  She was given 3 days of Granix.   · Emend added with cycle 3.  With improved nausea control  · Continuing home Granix x3 days following 5-FU disconnect  · 3/11/2020 due for cycle 4, however, she is experiencing progressive abdominal pain and occasional fevers.   · CT scan performed on 3/13/2020 reveals no evidence of progressive or recurrent disease.  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  · Patient hospitalized 4/24-4/26/20 after cycle 5 of chemotherapy with acute UTI.  CT abdomen/pelvis noting fluid collection in the presacral region having diminished in size compared to CT dated 3/13/2020.  Patient was evaluated by Dr. Ye while in the hospital with further plans to evaluate possible colovaginal fistula following completion of chemotherapy.  Patient did respond to IV antibiotics and discharged home on oral cefdinir.  · 4/29/2020 cycle 6 of FOLFOX.  Urinary symptoms have resolved   · Patient seen in the [facility], operated by [facility] ED on 5/2/2020 for what was suspected to be an allergic reaction.  She  called our service on Saturday explaining that she was experiencing hand and face swelling.  She was instructed to proceed to the emergency department at which time she was assessed and prescribed a Medrol Dosepak.  She had just completed her 5-FU and was unhooked on Friday, 5/1/2020.  Her symptoms have resolved in the office on assessment, 5/5/2020.  · The patient had grade 3 hand-foot syndrome based on symptomology.  Patient had cycle #8 of 5-FU on 5/13/2020. Due to her symptoms and poor tolerance to the 5-FU, her treatment dose will be reduced 50% for oxaliplatin and infusional 5-FU.  Bolus 5-FU will be discontinued..  · On 5/13/2020  We discussed further treatment options pending the scan results.  If she has indeed residual disease or metastatic disease, we will switch her to irinotecan plus Avastin or anti-EGFR monoclonal antibody.  She will need a further molecular testing of her tumor samples in that case.  · On 5/21/2020 patient had a PET scan and it showed no evidence of of metastatic disease in the neck, chest, abdomen or pelvis.  There was fluid accumulation/abscess.   · On 5/27/2020 I discussed with the patient that we can discontinue chemotherapy at this time.  She will follow-up with Dr. Ye to discuss a possibility to reverse the ileostomy.  We likely will obtain anther PET scan in 3 to 4 months for reassessment.    · Patient was seen by Dr. Ye on 6/19/2019 for evaluation and to discuss possible take down of her ileostomy.  She is scheduled to have a gastrografin enema on 7/2/2020 to evaluate for a fistula, and then a colonoscopy on 7/15/2020, both done by Dr. Ye.  She states that based on the above imaging and procedure findings, she may have a more extensive surgery done or just have her ileostomy reversed, which would likely be done in August 2020.  This will all be coordinated under the care of Dr. Ye.     · I reviewed scan results with the patient on 9/16/2020 for the most recent CT  scan from 09/09/2020 and also compared it to her previous PET scan examination from 05/21/2020 and also the original PET scan from 08/09/2019.  The original PET scan there is a small right upper lobe pulmonary nodule 6 mm with SUV 5.6. So in the 05/2020 PET scan the nodule is still there but activity seems much less and this most recent CT scan examination also documented the preexisting small pulmonary nodule however there is a new left lower lobe pulmonary nodule 8 mm in size and I shared with the patient today this nodule is suspicious for metastatic disease. Laboratory studies reported minimal CEA level 1.32 on 09/09/2020. Liver function panel was only marginally abnormal with the ALT 45, the remaining is normal. However laboratory study today showed worsening AST 55, ALT 95 and alkaline phosphatase 143 but is still normal, total bilirubin 0.3.   · So I discussed with the patient on 9/16/2020 recommended to have repeat PET scan examination for assessment because the left lower lobe pulmonary nodule is too small to be biopsied, and if the PET scan reports hypermetabolic lesion, I recommended to have stereotactic radiation therapy. Explained to patient that she is not a really good candidate to have thoracotomy for resection because she still has unhealed wound in the abdomen. I think the stereotactic radiation therapy will be a more feasible choice.  · Laboratory study on 9/16/2020 reported worsening liver function panel with both elevated AST, ALT and also slightly worsened alkaline phosphatase despite having normal total bilirubin. I recommended to repeat laboratory study for reevaluation. The only new medication she has in the past several days is Augmentin but otherwise no change of condition. She denies any recent viral infection. We will monitor this.   · PET scan examination obtained on 9/18/2020 showed metastatic disease.  It reported a few hypermetabolic pulmonary nodules, and some new small pulmonary  nodules, all of them highly suspicious for metastatic disease.  She also has hypermetabolic activity in the abdomen and pelvic area which could be related to scar tissue from her recent surgery versus metastatic disease.  Nevertheless overall picture fits with metastatic cancer.  · I discussed with the patient on 9/20/2020, we reviewed the PET scan images together, and I recommended to have systematic chemotherapy, I do not think stereotactic reading therapy to the hypermetabolic lesions in the lungs warranted at this time because even those will be treated with reading therapy, she will still need systematic chemotherapy.  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  I did discuss with the patient using anti-EGFR monoclonal antibody versus anti-VEGF monoclonal antibody.     · Palliative chemotherapy cycle#1 FOLFIRI was started on 10/13/2020.  No bolus 5-FU given considering previous poor tolerance.  Genetic studies still pending.   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    · Discussed with the patient 10/27/2020, because of the K-saskia mutation, she is not a candidate for anti-EGFR monoclonal antibody such as Erbitux or vectibix.  She could be a candidate for anti-VEGF monoclonal antibody, however because of active wound, she is not a candidate right now at this moment.  · Patient seen in the ED for acute right neck pain on 11/16/2020.  CT angiogram as well as CT of the cervical spine performed with no acute findings but notably one of her pulmonary nodules, left upper lobe, somewhat decreased in size.  Patient given prednisone pack.  Further details below.  · Patient due today for cycle #4 FOLFIRI.  Plan repeat PET scan after cycle 6.    2 .  Pulmonary nodule, hypermetabolic on PET scan.   · Her original PET scan examination from 8/8/2019 reported a small 8 mm right upper apex pulmonary nodule but  hypermetabolic SUV 5.1.  That was too small to be biopsied, however there was always a possibility of metastatic disease considering that high activity despite a such a small nodule.  · Her chest CT scan examination from 3/13/2020 reported this shrank to 6 mm.    · PET scan examination on 5/21/2020 reported no metabolic activity at this residual nodule.  This needs to be monitored.    · PET scan examination 9/18/2020 reported couple of hypermetabolic pulmonary nodules, besides a few extra small pulmonary nodules.  Those are highly suspicious for metastatic disease.      3.   Pulmonary emboli with background of positive factor V Leiden mutation   · The patient has heterozygous factor V Leiden mutation and therefore was on low-dose anticoagulation with Lovenox, 40 mg daily given her increased risk of thrombosis with MediPort and GI malignancy.    · Nevertheless this patient was found to having pulmonary emboli on 9/20/2019 despite low-dose Lovenox.  She had a brief hospitalization in late September 2019, she was started on full dose Xarelto anticoagulation per protocol.    · The patient continues on Xarelto and is tolerating this well.  No issues with bleeding.    4.  This patient also has strong family history for malignancy the patient had appointment with genetic counseling on September 3, 2019.  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    5.  Mild anemia, improved since her surgery.    · She also has a history of iron deficiency.  Iron studies reveal a saturation of just 10%.  She was started on oral iron but was unable to tolerate due to GI side effects.   · Status post Injectafer 2/5/2020 and 2/12/2020   · Hemoglobin today 13.9.    6.  Peripheral neuropathy secondary to chemotherapy.    · This is mild involving bilateral hands and feet as reported on 9/16/2020.   · Will avoid chemotherapy with oxaliplatin.  · Stable mild neuropathy as of 11/10/2020    7.  History of hand-foot syndrome grade 3.    · It become  so significant after cycle #7 FOLFOX treatment.  Discussed with patient on 5/13/2020, will discontinue the bolus 5-FU dose, and also decrease 50% of the infusion dose through the pump.   · We also use Medrol Dosepak for possible recurrent symptomology.  She responded this well.   · Her hand-foot syndrome improved.  · Under current treatment with FOLFIRI, patient not receiving 5-FU bolus.  No recurrent issues.    8.  Hyperlacrimation and mild scleral irritation related to 5-FU.  She has been taking steroid eyedrops.  This has improved her hyperlacrimation.  · Not currently an issue.  Continue to monitor with 5-FU.      9.  Abnormal liver function panel.  · Improved liver function panel 9/18/2020.  This is probably related to her recent infection.  · She has normalized AST, alk phosphatase, and a much improved only marginally elevated ALT 35 on 10/13/2020.    · Normalized liver function panel on 10/27/2020.    · Normal liver panel on 11/10/2020.  Continue to monitor.    10.  Intermittent leukocytopenia secondary to chemotherapy.   · WBC currently normal at 5.8, ANC 4.1.  Continue to monitor.  Consider G-CSF if neutropenia becomes an ongoing issue.      11.  Depression.  Patient seen by JULIET Davenport on 11/9/2020.  She was started on mirtazapine.  Lexapro was discontinued.  This has definitely improved her mood with the patient feeling overall much better.  She is sleeping better.  Appetite is improved with her actually eating more than she wants to.  She plans to continue follow-up with supportive oncology.    12.  Right neck/shoulder pain occurring twice now.  Patient reports that this occurred with both cycle #2 and cycle #3.  She did not mention it with cycle #2.  She did mention it was cycle #3 and per review of the EMR nursing note it was during irinotecan infusion.  Pain then subsided.  However it returned 5 days later with the patient going to the ED on 11/16/2020.  As imaging details are noted above, no  acute findings were found.  Etiology remains unclear.  Patient's Mediport is in the opposite side of her chest.  She was given a prednisone taper which she continues at this time.  I do still wonder if she is having some kind of weird reaction to irinotecan.  I did encourage the patient to speak up if she has recurrent pain during treatment today.  She verbalized understanding.    13.  Intermittent upper abdominal discomfort.  This improves with eating.  After further discussion with the patient she also notes 3 nights of increased reflux when laying down.  We discussed her symptoms could be related to increase stomach acid or potential ulcer.  She is currently taking Pepcid 20 mg daily.  I instructed her to add Prilosec 20 mg daily.       PLAN:  1. Proceed ahead with cycle #5 with the 5-FU infusion without irinotecan.  Continue with no bolus dose of 5-FU due to previous hand-foot syndrome.     2. Dr. Joseph's office will contact patient to set up for replacement of portacatheter.   3. Continue local wound care.  This is almost healed.  4. Patient is positive for [place] mutation by comprehensive NGS from [facility] laboratory.  Not a candidate for anti-EGFR monoclonal antibody.  Potential candidate for anti-VEGF monoclonal antibody, but due to continued wound healing, plan to delay initiation of Avastin.  I discussed with the patient again today on 12/8/2020.   5. Continue Xarelto 20 mg daily.   6. Continue Pepcid 20 mg daily and Prilosec for increased reflux and GI discomfort.  7. Continue mirtazapine.  Maintain follow-up with supportive oncology.  8. Due to the Christmas holiday, will postpone next chemotherapy by 1 week, so she will return in 3 weeks to see me in the clinic for reevaluation prior to cycle #6 FOLFIRI.    9. Consider possibly addition of Avastin if abdominal wound is completely closed at that point.    10. Plan to have PET scan after cycle #6 treatment.  11. I have asked the patient to call the  office should she experience new or worsening symptoms.     This patient is on drug therapy requiring intensive monitoring for toxicity.      I reviewed her CT scan images from 11/16/2020, and I shared those images with the patient.  I agreed there is small improvement of the left lower lobe pulmonary nodule.  The RUL pulmonary nodule is stable.    I communicated with Dr. Joseph for replacement of PowerPort.      I spent more than 60 minutes in patient care today including 40 minutes in direct face-to-face care and coordinating her care.       Dong Nguyen MD PhD  12/08/20        CC:  Deepika Vela III NP-C   MD Perla Bowers MD

## 2020-12-10 ENCOUNTER — TELEPHONE (OUTPATIENT)
Dept: SURGERY | Facility: CLINIC | Age: 40
End: 2020-12-10

## 2020-12-10 ENCOUNTER — INFUSION (OUTPATIENT)
Dept: ONCOLOGY | Facility: HOSPITAL | Age: 40
End: 2020-12-10

## 2020-12-10 ENCOUNTER — TRANSCRIBE ORDERS (OUTPATIENT)
Dept: SLEEP MEDICINE | Facility: HOSPITAL | Age: 40
End: 2020-12-10

## 2020-12-10 DIAGNOSIS — Z45.2 ENCOUNTER FOR FITTING AND ADJUSTMENT OF VASCULAR CATHETER: ICD-10-CM

## 2020-12-10 DIAGNOSIS — C20 ADENOCARCINOMA OF RECTUM (HCC): Primary | ICD-10-CM

## 2020-12-10 DIAGNOSIS — Z01.818 OTHER SPECIFIED PRE-OPERATIVE EXAMINATION: Primary | ICD-10-CM

## 2020-12-10 PROCEDURE — 25010000003 HEPARIN LOCK FLUSH PER 10 UNITS: Performed by: INTERNAL MEDICINE

## 2020-12-10 RX ORDER — SODIUM CHLORIDE 0.9 % (FLUSH) 0.9 %
10 SYRINGE (ML) INJECTION AS NEEDED
Status: CANCELLED | OUTPATIENT
Start: 2020-12-10

## 2020-12-10 RX ORDER — SODIUM CHLORIDE 0.9 % (FLUSH) 0.9 %
10 SYRINGE (ML) INJECTION AS NEEDED
Status: DISCONTINUED | OUTPATIENT
Start: 2020-12-10 | End: 2020-12-10 | Stop reason: HOSPADM

## 2020-12-10 RX ORDER — HEPARIN SODIUM (PORCINE) LOCK FLUSH IV SOLN 100 UNIT/ML 100 UNIT/ML
500 SOLUTION INTRAVENOUS AS NEEDED
Status: CANCELLED | OUTPATIENT
Start: 2020-12-10

## 2020-12-10 RX ORDER — HEPARIN SODIUM (PORCINE) LOCK FLUSH IV SOLN 100 UNIT/ML 100 UNIT/ML
500 SOLUTION INTRAVENOUS AS NEEDED
Status: DISCONTINUED | OUTPATIENT
Start: 2020-12-10 | End: 2020-12-10 | Stop reason: HOSPADM

## 2020-12-10 RX ADMIN — SODIUM CHLORIDE, PRESERVATIVE FREE 10 ML: 5 INJECTION INTRAVENOUS at 13:34

## 2020-12-10 RX ADMIN — Medication 500 UNITS: at 13:34

## 2020-12-14 ENCOUNTER — TELEPHONE (OUTPATIENT)
Dept: SURGERY | Facility: CLINIC | Age: 40
End: 2020-12-14

## 2020-12-16 ENCOUNTER — LAB (OUTPATIENT)
Dept: LAB | Facility: HOSPITAL | Age: 40
End: 2020-12-16

## 2020-12-16 DIAGNOSIS — Z01.818 OTHER SPECIFIED PRE-OPERATIVE EXAMINATION: ICD-10-CM

## 2020-12-16 PROCEDURE — C9803 HOPD COVID-19 SPEC COLLECT: HCPCS

## 2020-12-16 PROCEDURE — U0004 COV-19 TEST NON-CDC HGH THRU: HCPCS

## 2020-12-17 LAB — SARS-COV-2 RNA RESP QL NAA+PROBE: NOT DETECTED

## 2020-12-18 ENCOUNTER — ANESTHESIA EVENT (OUTPATIENT)
Dept: PERIOP | Facility: HOSPITAL | Age: 40
End: 2020-12-18

## 2020-12-18 ENCOUNTER — APPOINTMENT (OUTPATIENT)
Dept: GENERAL RADIOLOGY | Facility: HOSPITAL | Age: 40
End: 2020-12-18

## 2020-12-18 ENCOUNTER — ANESTHESIA (OUTPATIENT)
Dept: PERIOP | Facility: HOSPITAL | Age: 40
End: 2020-12-18

## 2020-12-18 ENCOUNTER — HOSPITAL ENCOUNTER (OUTPATIENT)
Facility: HOSPITAL | Age: 40
Setting detail: HOSPITAL OUTPATIENT SURGERY
Discharge: HOME | End: 2020-12-18
Attending: SURGERY | Admitting: SURGERY

## 2020-12-18 VITALS
HEART RATE: 102 BPM | RESPIRATION RATE: 18 BRPM | DIASTOLIC BLOOD PRESSURE: 95 MMHG | SYSTOLIC BLOOD PRESSURE: 142 MMHG | OXYGEN SATURATION: 97 % | TEMPERATURE: 97.4 F

## 2020-12-18 DIAGNOSIS — C20 ADENOCARCINOMA OF RECTUM (HCC): ICD-10-CM

## 2020-12-18 PROCEDURE — 36590 REMOVAL TUNNELED CV CATH: CPT | Performed by: SURGERY

## 2020-12-18 PROCEDURE — C1788 PORT, INDWELLING, IMP: HCPCS | Performed by: SURGERY

## 2020-12-18 PROCEDURE — 25010000002 PROPOFOL 10 MG/ML EMULSION: Performed by: NURSE ANESTHETIST, CERTIFIED REGISTERED

## 2020-12-18 PROCEDURE — 25010000002 HEPARIN (PORCINE) PER 1000 UNITS: Performed by: SURGERY

## 2020-12-18 PROCEDURE — 81025 URINE PREGNANCY TEST: CPT | Performed by: ANESTHESIOLOGY

## 2020-12-18 PROCEDURE — 76000 FLUOROSCOPY <1 HR PHYS/QHP: CPT

## 2020-12-18 PROCEDURE — 25010000002 FENTANYL CITRATE (PF) 100 MCG/2ML SOLUTION: Performed by: ANESTHESIOLOGY

## 2020-12-18 PROCEDURE — 77001 FLUOROGUIDE FOR VEIN DEVICE: CPT | Performed by: SURGERY

## 2020-12-18 PROCEDURE — 25010000002 MIDAZOLAM PER 1 MG: Performed by: ANESTHESIOLOGY

## 2020-12-18 PROCEDURE — 36561 INSERT TUNNELED CV CATH: CPT | Performed by: SURGERY

## 2020-12-18 PROCEDURE — 25010000003 CEFAZOLIN IN DEXTROSE 2-4 GM/100ML-% SOLUTION: Performed by: SURGERY

## 2020-12-18 PROCEDURE — S0260 H&P FOR SURGERY: HCPCS | Performed by: SURGERY

## 2020-12-18 DEVICE — POWERPORT M.R.I. IMPLANTABLE PORT WITH ATTACHABLE 8F CHRONOFLEX OPEN-ENDED SINGLE-LUMEN VENOUS CATHETER INTERMEDIATE KIT (WITH SUTURE PLUGS)
Type: IMPLANTABLE DEVICE | Status: FUNCTIONAL
Brand: POWERPORT M.R.I., CHRONOFLEX

## 2020-12-18 RX ORDER — LIDOCAINE HYDROCHLORIDE 20 MG/ML
INJECTION, SOLUTION INFILTRATION; PERINEURAL AS NEEDED
Status: DISCONTINUED | OUTPATIENT
Start: 2020-12-18 | End: 2020-12-18 | Stop reason: SURG

## 2020-12-18 RX ORDER — GLYCOPYRROLATE 0.2 MG/ML
INJECTION INTRAMUSCULAR; INTRAVENOUS AS NEEDED
Status: DISCONTINUED | OUTPATIENT
Start: 2020-12-18 | End: 2020-12-18 | Stop reason: SURG

## 2020-12-18 RX ORDER — HYDROCODONE BITARTRATE AND ACETAMINOPHEN 7.5; 325 MG/1; MG/1
1 TABLET ORAL ONCE AS NEEDED
Status: DISCONTINUED | OUTPATIENT
Start: 2020-12-18 | End: 2020-12-18 | Stop reason: HOSPADM

## 2020-12-18 RX ORDER — FENTANYL CITRATE 50 UG/ML
100 INJECTION, SOLUTION INTRAMUSCULAR; INTRAVENOUS
Status: DISCONTINUED | OUTPATIENT
Start: 2020-12-18 | End: 2020-12-18 | Stop reason: HOSPADM

## 2020-12-18 RX ORDER — SODIUM CHLORIDE 0.9 % (FLUSH) 0.9 %
3 SYRINGE (ML) INJECTION EVERY 12 HOURS SCHEDULED
Status: DISCONTINUED | OUTPATIENT
Start: 2020-12-18 | End: 2020-12-18 | Stop reason: HOSPADM

## 2020-12-18 RX ORDER — HYDROCODONE BITARTRATE AND ACETAMINOPHEN 5; 325 MG/1; MG/1
TABLET ORAL
Qty: 24 TABLET | Refills: 0 | Status: SHIPPED | OUTPATIENT
Start: 2020-12-18 | End: 2020-12-24 | Stop reason: HOSPADM

## 2020-12-18 RX ORDER — MIDAZOLAM HYDROCHLORIDE 1 MG/ML
2 INJECTION INTRAMUSCULAR; INTRAVENOUS
Status: DISCONTINUED | OUTPATIENT
Start: 2020-12-18 | End: 2020-12-18 | Stop reason: HOSPADM

## 2020-12-18 RX ORDER — CEFAZOLIN SODIUM 2 G/100ML
2 INJECTION, SOLUTION INTRAVENOUS ONCE
Status: COMPLETED | OUTPATIENT
Start: 2020-12-18 | End: 2020-12-18

## 2020-12-18 RX ORDER — ONDANSETRON 2 MG/ML
4 INJECTION INTRAMUSCULAR; INTRAVENOUS ONCE AS NEEDED
Status: DISCONTINUED | OUTPATIENT
Start: 2020-12-18 | End: 2020-12-18 | Stop reason: HOSPADM

## 2020-12-18 RX ORDER — FLUMAZENIL 0.1 MG/ML
0.2 INJECTION INTRAVENOUS AS NEEDED
Status: DISCONTINUED | OUTPATIENT
Start: 2020-12-18 | End: 2020-12-18 | Stop reason: HOSPADM

## 2020-12-18 RX ORDER — FENTANYL CITRATE 50 UG/ML
50 INJECTION, SOLUTION INTRAMUSCULAR; INTRAVENOUS
Status: DISCONTINUED | OUTPATIENT
Start: 2020-12-18 | End: 2020-12-18 | Stop reason: HOSPADM

## 2020-12-18 RX ORDER — PROPOFOL 10 MG/ML
VIAL (ML) INTRAVENOUS AS NEEDED
Status: DISCONTINUED | OUTPATIENT
Start: 2020-12-18 | End: 2020-12-18 | Stop reason: SURG

## 2020-12-18 RX ORDER — PROPOFOL 10 MG/ML
VIAL (ML) INTRAVENOUS CONTINUOUS PRN
Status: DISCONTINUED | OUTPATIENT
Start: 2020-12-18 | End: 2020-12-18 | Stop reason: SURG

## 2020-12-18 RX ORDER — MAGNESIUM HYDROXIDE 1200 MG/15ML
LIQUID ORAL AS NEEDED
Status: DISCONTINUED | OUTPATIENT
Start: 2020-12-18 | End: 2020-12-18 | Stop reason: HOSPADM

## 2020-12-18 RX ORDER — HYDROMORPHONE HYDROCHLORIDE 1 MG/ML
0.5 INJECTION, SOLUTION INTRAMUSCULAR; INTRAVENOUS; SUBCUTANEOUS
Status: DISCONTINUED | OUTPATIENT
Start: 2020-12-18 | End: 2020-12-18 | Stop reason: HOSPADM

## 2020-12-18 RX ORDER — SODIUM CHLORIDE 0.9 % (FLUSH) 0.9 %
3-10 SYRINGE (ML) INJECTION AS NEEDED
Status: DISCONTINUED | OUTPATIENT
Start: 2020-12-18 | End: 2020-12-18 | Stop reason: HOSPADM

## 2020-12-18 RX ORDER — LIDOCAINE HYDROCHLORIDE 10 MG/ML
0.5 INJECTION, SOLUTION EPIDURAL; INFILTRATION; INTRACAUDAL; PERINEURAL ONCE AS NEEDED
Status: DISCONTINUED | OUTPATIENT
Start: 2020-12-18 | End: 2020-12-18 | Stop reason: HOSPADM

## 2020-12-18 RX ORDER — OXYCODONE AND ACETAMINOPHEN 7.5; 325 MG/1; MG/1
1 TABLET ORAL ONCE AS NEEDED
Status: DISCONTINUED | OUTPATIENT
Start: 2020-12-18 | End: 2020-12-18 | Stop reason: HOSPADM

## 2020-12-18 RX ORDER — EPHEDRINE SULFATE 50 MG/ML
5 INJECTION, SOLUTION INTRAVENOUS ONCE AS NEEDED
Status: DISCONTINUED | OUTPATIENT
Start: 2020-12-18 | End: 2020-12-18 | Stop reason: HOSPADM

## 2020-12-18 RX ORDER — BUPIVACAINE HYDROCHLORIDE AND EPINEPHRINE 5; 5 MG/ML; UG/ML
INJECTION, SOLUTION PERINEURAL AS NEEDED
Status: DISCONTINUED | OUTPATIENT
Start: 2020-12-18 | End: 2020-12-18 | Stop reason: HOSPADM

## 2020-12-18 RX ORDER — ONDANSETRON 4 MG/1
4 TABLET, FILM COATED ORAL EVERY 6 HOURS PRN
Qty: 10 TABLET | Refills: 1 | Status: SHIPPED | OUTPATIENT
Start: 2020-12-18 | End: 2021-01-15

## 2020-12-18 RX ORDER — SODIUM CHLORIDE, SODIUM LACTATE, POTASSIUM CHLORIDE, CALCIUM CHLORIDE 600; 310; 30; 20 MG/100ML; MG/100ML; MG/100ML; MG/100ML
9 INJECTION, SOLUTION INTRAVENOUS CONTINUOUS
Status: DISCONTINUED | OUTPATIENT
Start: 2020-12-18 | End: 2020-12-18 | Stop reason: HOSPADM

## 2020-12-18 RX ADMIN — MIDAZOLAM 2 MG: 1 INJECTION INTRAMUSCULAR; INTRAVENOUS at 07:36

## 2020-12-18 RX ADMIN — GLYCOPYRROLATE 0.1 MG: 0.2 INJECTION INTRAMUSCULAR; INTRAVENOUS at 08:46

## 2020-12-18 RX ADMIN — LIDOCAINE HYDROCHLORIDE 30 MG: 20 INJECTION, SOLUTION INFILTRATION; PERINEURAL at 08:45

## 2020-12-18 RX ADMIN — PROPOFOL 50 MG: 10 INJECTION, EMULSION INTRAVENOUS at 08:47

## 2020-12-18 RX ADMIN — FENTANYL CITRATE 25 MCG: 50 INJECTION INTRAMUSCULAR; INTRAVENOUS at 08:47

## 2020-12-18 RX ADMIN — CEFAZOLIN SODIUM 2 G: 2 INJECTION, SOLUTION INTRAVENOUS at 08:45

## 2020-12-18 RX ADMIN — PROPOFOL 160 MCG/KG/MIN: 10 INJECTION, EMULSION INTRAVENOUS at 08:47

## 2020-12-18 RX ADMIN — SODIUM CHLORIDE, POTASSIUM CHLORIDE, SODIUM LACTATE AND CALCIUM CHLORIDE 9 ML/HR: 600; 310; 30; 20 INJECTION, SOLUTION INTRAVENOUS at 07:32

## 2020-12-18 NOTE — H&P
HPI: Metastatic rectal cancer with poorly functioning left subclavian PowerPort    PMH, PSH, MEDS AND ALLERGIES reviewed and reconciled with EPIC    PHYSICAL EXAM:  -  Constitutional:  no acute distress  -  Respiratory:  normal inspiratory effort  -  Cardiovascular: regular rate  -  Gastrointestinal: Soft    ASSESSMENT/PLAN:    Removal of left subclavian PowerPort and insertion of right subclavian or internal jugular PowerPort.  She understands rationale for the procedure, nature of the procedure, and the risks including but not limited to bleeding, infection, pneumothorax, venous thrombosis, and functional issues which can affect the port over time.    Sj Joseph M.D.

## 2020-12-18 NOTE — ANESTHESIA POSTPROCEDURE EVALUATION
Patient: Carole Weaver    Procedure Summary     Date: 12/18/20 Room / Location:  [facility] /  [facility]    Anesthesia Start: 0842 Anesthesia Stop: 0934    Procedure: INSERTION VENOUS ACCESS DEVICE right side, removal venous access device left side (N/A Abdomen) Diagnosis:       Adenocarcinoma of rectum (CMS/HCC)      (Adenocarcinoma of rectum (CMS/HCC) [C20])    Surgeon: Sj Joseph MD Provider: Primo Foster MD    Anesthesia Type: MAC ASA Status: 3          Anesthesia Type: MAC    Vitals  Vitals Value Taken Time   /95 12/18/20 0950   Temp     Pulse 102 12/18/20 0950   Resp 18 12/18/20 0950   SpO2 97 % 12/18/20 0950           Post Anesthesia Care and Evaluation    Patient location during evaluation: bedside  Patient participation: complete - patient participated  Level of consciousness: awake  Pain score: 1  Pain management: adequate  Airway patency: patent  Anesthetic complications: No anesthetic complications  PONV Status: controlled  Cardiovascular status: acceptable  Respiratory status: acceptable  Hydration status: acceptable    Comments: --------------------            12/18/20               0950     --------------------   BP:       142/95     Pulse:     102       Resp:       18       Temp:                SpO2:      97%      --------------------

## 2020-12-18 NOTE — ANESTHESIA PREPROCEDURE EVALUATION
Anesthesia Evaluation     Patient summary reviewed and Nursing notes reviewed   NPO Solid Status: > 8 hours             Airway   Mallampati: III  TM distance: >3 FB  Neck ROM: full  No difficulty expected  Dental      Pulmonary    (+) pulmonary embolism, a smoker Current, lung cancer,   Cardiovascular     (+) hypertension, valvular problems/murmurs,       Neuro/Psych- negative ROS  GI/Hepatic/Renal/Endo - negative ROS     Musculoskeletal (-) negative ROS    Abdominal    Substance History - negative use     OB/GYN negative ob/gyn ROS         Other        ROS/Med Hx Other: On chemo now        Anesthesia Evaluation     NPO Solid Status: > 8 hours             Airway   Mallampati: III  Dental      Pulmonary    (+) pulmonary embolism, a smoker Current Abstained day of surgery, sleep apnea,   Cardiovascular         Neuro/Psych  (+) psychiatric history Anxiety and Depression,     GI/Hepatic/Renal/Endo    (+) obesity,  GERD,      Musculoskeletal     Abdominal    Substance History      OB/GYN          Other                        Anesthesia Plan    ASA 3     general       Anesthetic plan, all risks, benefits, and alternatives have been provided, discussed and informed consent has been obtained with: patient.               Anesthesia Plan    ASA 3     MAC     intravenous induction     Anesthetic plan, all risks, benefits, and alternatives have been provided, discussed and informed consent has been obtained with: patient.    Plan discussed with CRNA.

## 2020-12-18 NOTE — DISCHARGE INSTRUCTIONS
Dr. Sj Joseph  [address]  [place] [address]  [phone]    Discharge Instructions for Port Placement    1. Go home, rest and take it easy today; however, you should get up and move about several times today to reduce the risk of developing a clot in your legs.      2. You may experience some dizziness or memory loss from the anesthesia.  This may last for the next 24 hours.  Someone should plan on staying with you for the first 24 hours for your safety.    3. Do not make any important legal decisions or sign any legal papers for the next 24 hours.      4. Eat and drink lightly today.  Start off with liquids, jello, soup, crackers or other bland foods at first. You may advance your diet tomorrow as tolerated as long as you do not experience any nausea or vomiting.     5. You may remove your outer dressings in 3-4 days or until your first treatment whichever comes first. If you remove the dressing before your first treatment, please leave the little white tapes known as steri-strips alone.  They usually fall off in 1-2 weeks.  Do not worry if they come off sooner. If skin glue(Dermabond) was used, your incision is covered and protected. The invisible glue will dissolve on its own as your incision heals.    6. You may notice some bleeding/drainage on your outer dressings. A little bloody drainage is normal. If the bleeding/drainage is such that the bandage cannot absorb it, remove the dressing, apply clean gauze and apply firm pressure for a full 15 minutes.  If the bleeding continues, please call me.    7. You may shower tomorrow allowing water to run over the incisions; however, do not scrub the incision.  No tub baths until your incisions are completely healed.    8. You have received a prescription for a narcotic pain medicine, as you may have some pain/discomfort following surgery.   You will not be totally pain free, but your pain medicine should make the pain tolerable.  Please take  your pain medicine as prescribed and always take your pills with food to prevent nausea. If you are having severe pain that cannot be controlled by the pain medicine, please contact me.  If the pain is such that narcotic pain medicine is not required, you may take Tylenol or Ibuprofen as directed unless indicated otherwise.      9. You have also received a prescription for an anti-nausea medicine.  Please take this as prescribed for any nausea or vomiting.  Nausea could be a result of the anesthesia or a result of the narcotic pain medicine.  If you experience severe nausea and vomiting that cannot be controlled by the nausea medicine, please call me.      10. No driving for 24 hours and for as long as you are taking your prescription pain medicine.      11. If the port is to be used within 10-14 days of placement, no surgical follow-up is needed.  Otherwise, you should call the office at [phone] to schedule a follow-up in about 1 week.      12. Remember to contact me for any of the following:    • Fever> 101 degrees  • Severe pain that cannot be controlled by taking your pain pills  • Severe nausea or vomiting that cannot be controlled by taking your nausea medicine  • Significant bleeding from your incision  • Drainage that has a bad smell or is yellow or green in appearance  • Any other questions or concerns      Dr. Sj Joseph  [address]  [place] [id number] [phone]    Discharge Instructions for Port Removal    13. Go home, rest and take it easy today; however, you should get up and move about several times today to reduce the risk of developing a clot in your legs.      14. You may experience some dizziness or memory loss from the anesthesia.  This may last for the next 24 hours.  Someone should plan on staying with you for the first 24 hours for your safety.    15. Do not make any important legal decisions or sign any legal papers for the next 24 hours.      16. Eat and drink  lightly today.  Start off with liquids, jello, soup, crackers or other bland foods at first. You may advance your diet tomorrow as tolerated as long as you do not experience any nausea or vomiting.     17. If skin glue (Dermabond) was used, your incisions are protected and covered.  The invisible glue will dissolve on its own as your incision heals. If you have dressings, you may remove your outer dressings in 3 days.  The white tapes called steri-strips should stay in place.  They will fall off on their own in 1-2 weeks.  Do not worry if they come off sooner.     18. You may notice some bleeding/drainage on your outer dressings. A little bloody drainage is normal. If the bleeding/drainage is such that the bandage cannot absorb it, remove the dressing, apply clean gauze and apply firm pressure for a full 15 minutes.  If the bleeding continues, please call me.    19. You may shower tomorrow allowing water to run over the incisions; however do not scrub the incisions.  No tub baths until your incisions are completely healed.    20. You have received a prescription for a narcotic pain medicine, as you may have some pain/discomfort following surgery.   You will not be totally pain free, but your pain medicine should make the pain tolerable.  Please take your pain medicine as prescribed and always take your pills with food to prevent nausea. If you are having severe pain that cannot be controlled by the pain medicine, please contact me.  If the pain is such that narcotic pain medicine is not required, you may take Tylenol or Ibuprofen as directed unless indicated otherwise.      21. You have also received a prescription for an anti-nausea medicine.  Please take this as prescribed for any nausea or vomiting.  Nausea could be a result of the anesthesia or a result of the narcotic pain medicine.  If you experience severe nausea and vomiting that cannot be controlled by the nausea medicine, please call me.      22. No  driving for 24 hours and for as long as you are taking your prescription pain medicine.      23. Please call  the office at [phone] to schedule a follow-up in about 1 week.      24. Remember to contact me for any of the following:    • Fever> 101 degrees  • Severe pain that cannot be controlled by taking your pain pills  • Severe nausea or vomiting that cannot be controlled by taking your nausea medicine  • Significant bleeding from your incision  • Drainage that has a bad smell or is yellow or green in appearance  • Any other questions or concerns

## 2020-12-18 NOTE — OP NOTE
PREOPERATIVE DIAGNOSIS:  Metastatic rectal cancer with malfunctioning port    POSTOPERATIVE DIAGNOSIS (FINDINGS):  Same    PROCEDURE:  1.  Removal left subclavian port  2.  Insertion of right internal jugular 8 [place] PowerPort with sonographic and fluoroscopic guidance    SURGEON:  Sj Joseph MD    ASSISTANT:  Valerie Mojica    ANESTHESIA:  Monitored sedation    EBL:  Minimal    SPECIMEN(S):  none    DESCRIPTION:  In supine position under sedation prepped and draped usual sterile manner.  I had preoperatively used the SonoSite to giovanna the right internal jugular vein.  Half percent Marcaine with epinephrine infiltrated locally at all incision sites.  Small incision made for the medial approach to the right internal jugular vein.  18-gauge needle inserted with no difficulty into the vein followed by guidewire under fluoroscopic guidance into the superior vena cava.  Subcutaneous pocket was created in the right subclavian location and 8 [place] PowerPort was pulled into the pocket and line was pulled through the guidewire insertion site.  Vein dilator and sheath were introduced.  Dilator and guidewire were removed and port was inserted to 16 cm depth and sheath was stripped away.  Venous blood was easily aspirated and heparinized saline was flushed.  Fluoroscopy confirmed good positioning of the tip in the superior vena cava.  Good hemostasis was noted and skin was closed with 3-0 Vicryl deep dermal and 5-0 Vicryl subcuticular.  Transverse incision was made over the left subclavian port site and the port was identified and removed intact without difficulty.  Hemostasis with electrocautery.  Skin closed with 0 Vicryl deep dermal and 5-0 Vicryl subcuticular.  Dermabond applied to all incisions.  Tolerated well.    Sj Joseph M.D.

## 2020-12-21 ENCOUNTER — APPOINTMENT (OUTPATIENT)
Dept: CT IMAGING | Facility: HOSPITAL | Age: 40
End: 2020-12-21

## 2020-12-21 ENCOUNTER — HOSPITAL ENCOUNTER (INPATIENT)
Facility: HOSPITAL | Age: 40
LOS: 3 days | Discharge: HOME | End: 2020-12-24
Attending: EMERGENCY MEDICINE | Admitting: INTERNAL MEDICINE

## 2020-12-21 DIAGNOSIS — R00.0 TACHYCARDIA, UNSPECIFIED: ICD-10-CM

## 2020-12-21 DIAGNOSIS — I82.290: ICD-10-CM

## 2020-12-21 DIAGNOSIS — R06.02 SHORTNESS OF BREATH: ICD-10-CM

## 2020-12-21 DIAGNOSIS — I82.210 THROMBOSIS OF SUPERIOR VENA CAVA (HCC): Primary | ICD-10-CM

## 2020-12-21 LAB
ALBUMIN SERPL-MCNC: 3.5 G/DL (ref 3.5–5.2)
ALBUMIN/GLOB SERPL: 0.9 G/DL
ALP SERPL-CCNC: 115 U/L (ref 39–117)
ALT SERPL W P-5'-P-CCNC: 29 U/L (ref 1–33)
ANION GAP SERPL CALCULATED.3IONS-SCNC: 14 MMOL/L (ref 5–15)
APTT PPP: 26.5 SECONDS (ref 22.7–35.4)
APTT PPP: 69.8 SECONDS (ref 22.7–35.4)
AST SERPL-CCNC: 28 U/L (ref 1–32)
BASOPHILS # BLD AUTO: 0.02 10*3/MM3 (ref 0–0.2)
BASOPHILS NFR BLD AUTO: 0.2 % (ref 0–1.5)
BILIRUB SERPL-MCNC: 0.8 MG/DL (ref 0–1.2)
BUN SERPL-MCNC: 9 MG/DL (ref 6–20)
BUN/CREAT SERPL: 10.6 (ref 7–25)
CALCIUM SPEC-SCNC: 9.2 MG/DL (ref 8.6–10.5)
CHLORIDE SERPL-SCNC: 99 MMOL/L (ref 98–107)
CO2 SERPL-SCNC: 21 MMOL/L (ref 22–29)
CREAT SERPL-MCNC: 0.85 MG/DL (ref 0.57–1)
D-LACTATE SERPL-SCNC: 1.5 MMOL/L (ref 0.5–2)
DEPRECATED RDW RBC AUTO: 58.4 FL (ref 37–54)
EOSINOPHIL # BLD AUTO: 0.06 10*3/MM3 (ref 0–0.4)
EOSINOPHIL NFR BLD AUTO: 0.6 % (ref 0.3–6.2)
ERYTHROCYTE [DISTWIDTH] IN BLOOD BY AUTOMATED COUNT: 17.7 % (ref 12.3–15.4)
GFR SERPL CREATININE-BSD FRML MDRD: 74 ML/MIN/1.73
GLOBULIN UR ELPH-MCNC: 3.8 GM/DL
GLUCOSE SERPL-MCNC: 129 MG/DL (ref 65–99)
HCT VFR BLD AUTO: 37.8 % (ref 34–46.6)
HGB BLD-MCNC: 12.8 G/DL (ref 12–15.9)
HOLD SPECIMEN: NORMAL
HOLD SPECIMEN: NORMAL
IMM GRANULOCYTES # BLD AUTO: 0.07 10*3/MM3 (ref 0–0.05)
IMM GRANULOCYTES NFR BLD AUTO: 0.7 % (ref 0–0.5)
INR PPP: 1.4 (ref 0.9–1.1)
LYMPHOCYTES # BLD AUTO: 0.74 10*3/MM3 (ref 0.7–3.1)
LYMPHOCYTES NFR BLD AUTO: 7.2 % (ref 19.6–45.3)
MCH RBC QN AUTO: 31.6 PG (ref 26.6–33)
MCHC RBC AUTO-ENTMCNC: 33.9 G/DL (ref 31.5–35.7)
MCV RBC AUTO: 93.3 FL (ref 79–97)
MONOCYTES # BLD AUTO: 1.12 10*3/MM3 (ref 0.1–0.9)
MONOCYTES NFR BLD AUTO: 10.9 % (ref 5–12)
NEUTROPHILS NFR BLD AUTO: 8.28 10*3/MM3 (ref 1.7–7)
NEUTROPHILS NFR BLD AUTO: 80.4 % (ref 42.7–76)
NRBC BLD AUTO-RTO: 0 /100 WBC (ref 0–0.2)
NT-PROBNP SERPL-MCNC: 103 PG/ML (ref 0–450)
PLATELET # BLD AUTO: 195 10*3/MM3 (ref 140–450)
PMV BLD AUTO: 9 FL (ref 6–12)
POTASSIUM SERPL-SCNC: 4.2 MMOL/L (ref 3.5–5.2)
PROT SERPL-MCNC: 7.3 G/DL (ref 6–8.5)
PROTHROMBIN TIME: 16.9 SECONDS (ref 11.7–14.2)
QT INTERVAL: 294 MS
RBC # BLD AUTO: 4.05 10*6/MM3 (ref 3.77–5.28)
SARS-COV-2 RNA RESP QL NAA+PROBE: NOT DETECTED
SODIUM SERPL-SCNC: 134 MMOL/L (ref 136–145)
TROPONIN T SERPL-MCNC: <0.01 NG/ML (ref 0–0.03)
WBC # BLD AUTO: 10.29 10*3/MM3 (ref 3.4–10.8)
WHOLE BLOOD HOLD SPECIMEN: NORMAL
WHOLE BLOOD HOLD SPECIMEN: NORMAL

## 2020-12-21 PROCEDURE — U0003 INFECTIOUS AGENT DETECTION BY NUCLEIC ACID (DNA OR RNA); SEVERE ACUTE RESPIRATORY SYNDROME CORONAVIRUS 2 (SARS-COV-2) (CORONAVIRUS DISEASE [COVID-19]), AMPLIFIED PROBE TECHNIQUE, MAKING USE OF HIGH THROUGHPUT TECHNOLOGIES AS DESCRIBED BY CMS-2020-01-R: HCPCS | Performed by: NURSE PRACTITIONER

## 2020-12-21 PROCEDURE — 85730 THROMBOPLASTIN TIME PARTIAL: CPT | Performed by: NURSE PRACTITIONER

## 2020-12-21 PROCEDURE — 71260 CT THORAX DX C+: CPT

## 2020-12-21 PROCEDURE — 25010000002 ONDANSETRON PER 1 MG: Performed by: NURSE PRACTITIONER

## 2020-12-21 PROCEDURE — 85610 PROTHROMBIN TIME: CPT | Performed by: NURSE PRACTITIONER

## 2020-12-21 PROCEDURE — 84484 ASSAY OF TROPONIN QUANT: CPT | Performed by: NURSE PRACTITIONER

## 2020-12-21 PROCEDURE — 83605 ASSAY OF LACTIC ACID: CPT | Performed by: NURSE PRACTITIONER

## 2020-12-21 PROCEDURE — 80053 COMPREHEN METABOLIC PANEL: CPT | Performed by: NURSE PRACTITIONER

## 2020-12-21 PROCEDURE — 25010000002 HEPARIN (PORCINE) PER 1000 UNITS: Performed by: NURSE PRACTITIONER

## 2020-12-21 PROCEDURE — 25010000002 HYDROMORPHONE PER 4 MG: Performed by: NURSE PRACTITIONER

## 2020-12-21 PROCEDURE — 25010000002 HYDROMORPHONE PER 4 MG: Performed by: INTERNAL MEDICINE

## 2020-12-21 PROCEDURE — 83880 ASSAY OF NATRIURETIC PEPTIDE: CPT | Performed by: NURSE PRACTITIONER

## 2020-12-21 PROCEDURE — 85025 COMPLETE CBC W/AUTO DIFF WBC: CPT | Performed by: NURSE PRACTITIONER

## 2020-12-21 PROCEDURE — 25010000002 IOPAMIDOL 61 % SOLUTION: Performed by: EMERGENCY MEDICINE

## 2020-12-21 PROCEDURE — 85730 THROMBOPLASTIN TIME PARTIAL: CPT | Performed by: INTERNAL MEDICINE

## 2020-12-21 PROCEDURE — 93005 ELECTROCARDIOGRAM TRACING: CPT | Performed by: NURSE PRACTITIONER

## 2020-12-21 PROCEDURE — 99284 EMERGENCY DEPT VISIT MOD MDM: CPT

## 2020-12-21 PROCEDURE — 93010 ELECTROCARDIOGRAM REPORT: CPT | Performed by: INTERNAL MEDICINE

## 2020-12-21 RX ORDER — ACETAMINOPHEN 325 MG/1
650 TABLET ORAL EVERY 4 HOURS PRN
Status: DISCONTINUED | OUTPATIENT
Start: 2020-12-21 | End: 2020-12-25 | Stop reason: HOSPADM

## 2020-12-21 RX ORDER — ONDANSETRON 2 MG/ML
4 INJECTION INTRAMUSCULAR; INTRAVENOUS ONCE
Status: COMPLETED | OUTPATIENT
Start: 2020-12-21 | End: 2020-12-21

## 2020-12-21 RX ORDER — SODIUM CHLORIDE 0.9 % (FLUSH) 0.9 %
10 SYRINGE (ML) INJECTION AS NEEDED
Status: DISCONTINUED | OUTPATIENT
Start: 2020-12-21 | End: 2020-12-25 | Stop reason: HOSPADM

## 2020-12-21 RX ORDER — HYDROMORPHONE HYDROCHLORIDE 1 MG/ML
0.5 INJECTION, SOLUTION INTRAMUSCULAR; INTRAVENOUS; SUBCUTANEOUS
Status: DISCONTINUED | OUTPATIENT
Start: 2020-12-21 | End: 2020-12-22 | Stop reason: SDUPTHER

## 2020-12-21 RX ORDER — HEPARIN SODIUM 10000 [USP'U]/100ML
14.7 INJECTION, SOLUTION INTRAVENOUS
Status: DISCONTINUED | OUTPATIENT
Start: 2020-12-21 | End: 2020-12-24

## 2020-12-21 RX ORDER — HEPARIN SODIUM 5000 [USP'U]/ML
80 INJECTION, SOLUTION INTRAVENOUS; SUBCUTANEOUS ONCE
Status: DISCONTINUED | OUTPATIENT
Start: 2020-12-21 | End: 2020-12-21

## 2020-12-21 RX ORDER — HYDROCODONE BITARTRATE AND ACETAMINOPHEN 7.5; 325 MG/1; MG/1
1 TABLET ORAL EVERY 6 HOURS PRN
Status: DISCONTINUED | OUTPATIENT
Start: 2020-12-21 | End: 2020-12-25 | Stop reason: HOSPADM

## 2020-12-21 RX ORDER — HEPARIN SODIUM 5000 [USP'U]/ML
40-80 INJECTION, SOLUTION INTRAVENOUS; SUBCUTANEOUS EVERY 6 HOURS PRN
Status: DISCONTINUED | OUTPATIENT
Start: 2020-12-21 | End: 2020-12-21

## 2020-12-21 RX ORDER — DICYCLOMINE HCL 20 MG
TABLET ORAL
Qty: 360 TABLET | Refills: 0 | Status: SHIPPED | OUTPATIENT
Start: 2020-12-21 | End: 2021-03-17

## 2020-12-21 RX ORDER — HEPARIN SODIUM 5000 [USP'U]/ML
40-80 INJECTION, SOLUTION INTRAVENOUS; SUBCUTANEOUS EVERY 6 HOURS PRN
Status: DISCONTINUED | OUTPATIENT
Start: 2020-12-21 | End: 2020-12-24

## 2020-12-21 RX ORDER — CETIRIZINE HYDROCHLORIDE 10 MG/1
10 TABLET ORAL DAILY
Status: DISCONTINUED | OUTPATIENT
Start: 2020-12-21 | End: 2020-12-25 | Stop reason: HOSPADM

## 2020-12-21 RX ORDER — ONDANSETRON 4 MG/1
4 TABLET, FILM COATED ORAL EVERY 6 HOURS PRN
Status: DISCONTINUED | OUTPATIENT
Start: 2020-12-21 | End: 2020-12-25 | Stop reason: HOSPADM

## 2020-12-21 RX ORDER — HEPARIN SODIUM 5000 [USP'U]/ML
80 INJECTION, SOLUTION INTRAVENOUS; SUBCUTANEOUS ONCE
Status: COMPLETED | OUTPATIENT
Start: 2020-12-21 | End: 2020-12-21

## 2020-12-21 RX ORDER — UREA 10 %
3 LOTION (ML) TOPICAL NIGHTLY PRN
Status: DISCONTINUED | OUTPATIENT
Start: 2020-12-21 | End: 2020-12-25 | Stop reason: HOSPADM

## 2020-12-21 RX ORDER — FAMOTIDINE 20 MG/1
20 TABLET, FILM COATED ORAL 2 TIMES DAILY
Status: DISCONTINUED | OUTPATIENT
Start: 2020-12-21 | End: 2020-12-25 | Stop reason: HOSPADM

## 2020-12-21 RX ORDER — NITROGLYCERIN 0.4 MG/1
0.4 TABLET SUBLINGUAL
Status: DISCONTINUED | OUTPATIENT
Start: 2020-12-21 | End: 2020-12-25 | Stop reason: HOSPADM

## 2020-12-21 RX ORDER — CLONAZEPAM 1 MG/1
1 TABLET ORAL 3 TIMES DAILY PRN
Status: DISCONTINUED | OUTPATIENT
Start: 2020-12-21 | End: 2020-12-25 | Stop reason: HOSPADM

## 2020-12-21 RX ORDER — ONDANSETRON 2 MG/ML
4 INJECTION INTRAMUSCULAR; INTRAVENOUS EVERY 6 HOURS PRN
Status: DISCONTINUED | OUTPATIENT
Start: 2020-12-21 | End: 2020-12-25 | Stop reason: HOSPADM

## 2020-12-21 RX ORDER — MIRTAZAPINE 15 MG/1
15 TABLET, ORALLY DISINTEGRATING ORAL NIGHTLY
Status: DISCONTINUED | OUTPATIENT
Start: 2020-12-21 | End: 2020-12-25 | Stop reason: HOSPADM

## 2020-12-21 RX ORDER — HYDROMORPHONE HYDROCHLORIDE 1 MG/ML
0.5 INJECTION, SOLUTION INTRAMUSCULAR; INTRAVENOUS; SUBCUTANEOUS ONCE
Status: COMPLETED | OUTPATIENT
Start: 2020-12-21 | End: 2020-12-21

## 2020-12-21 RX ADMIN — HYDROMORPHONE HYDROCHLORIDE 0.5 MG: 1 INJECTION, SOLUTION INTRAMUSCULAR; INTRAVENOUS; SUBCUTANEOUS at 17:51

## 2020-12-21 RX ADMIN — ONDANSETRON 4 MG: 2 INJECTION INTRAMUSCULAR; INTRAVENOUS at 15:18

## 2020-12-21 RX ADMIN — HEPARIN SODIUM 14.7 UNITS/KG/HR: 10000 INJECTION, SOLUTION INTRAVENOUS at 19:54

## 2020-12-21 RX ADMIN — HYDROMORPHONE HYDROCHLORIDE 0.5 MG: 1 INJECTION, SOLUTION INTRAMUSCULAR; INTRAVENOUS; SUBCUTANEOUS at 15:14

## 2020-12-21 RX ADMIN — HYDROMORPHONE HYDROCHLORIDE 0.5 MG: 1 INJECTION, SOLUTION INTRAMUSCULAR; INTRAVENOUS; SUBCUTANEOUS at 23:46

## 2020-12-21 RX ADMIN — DICLOFENAC SODIUM 4 G: 10 GEL TOPICAL at 21:30

## 2020-12-21 RX ADMIN — ACETAMINOPHEN 650 MG: 325 TABLET, FILM COATED ORAL at 23:46

## 2020-12-21 RX ADMIN — HEPARIN SODIUM 14.7 UNITS/KG/HR: 10000 INJECTION, SOLUTION INTRAVENOUS at 15:20

## 2020-12-21 RX ADMIN — HEPARIN SODIUM 8200 UNITS: 5000 INJECTION INTRAVENOUS; SUBCUTANEOUS at 15:18

## 2020-12-21 RX ADMIN — MIRTAZAPINE 15 MG: 15 TABLET, ORALLY DISINTEGRATING ORAL at 21:30

## 2020-12-21 RX ADMIN — FAMOTIDINE 20 MG: 20 TABLET, FILM COATED ORAL at 21:30

## 2020-12-21 RX ADMIN — CETIRIZINE HYDROCHLORIDE 10 MG: 10 TABLET ORAL at 21:30

## 2020-12-21 RX ADMIN — HYDROCODONE BITARTRATE AND ACETAMINOPHEN 1 TABLET: 7.5; 325 TABLET ORAL at 21:30

## 2020-12-21 RX ADMIN — IOPAMIDOL 85 ML: 612 INJECTION, SOLUTION INTRAVENOUS at 12:47

## 2020-12-21 NOTE — ED TRIAGE NOTES
Pt had a port taken out on Friday and a new one put in.  She is swollen through neck and face.  Hands are swollen.  Face is discolored.  Pt has colon cancer and is getting chemo    Patient was placed in face mask during first look triage.  Patient was wearing a face mask throughout encounter.  I wore personal protective equipment throughout the encounter.  Hand hygiene was performed before and after patient encounter.

## 2020-12-21 NOTE — ED NOTES
Spoke with the IV team, a nurse in en rout to the ER for eval and placement of IV for CTA scan of this patient. As Rad tech reports the left AC 18g iv does not flush when the arm is raised above her head for the scan. ARNP notified, charge nurse notified     Violeta Byrd RN  12/21/20 7178

## 2020-12-21 NOTE — PLAN OF CARE
Problem: Adult Inpatient Plan of Care  Goal: Plan of Care Review  12/21/2020 1846 by Anh Altamirano RN  Outcome: Ongoing, Progressing  12/21/2020 1838 by Anh Altamirano RN  Outcome: Ongoing, Progressing  12/21/2020 1832 by Anh Altamirano RN  Outcome: Ongoing, Progressing  Goal: Patient-Specific Goal (Individualized)  12/21/2020 1846 by Anh Altamirano RN  Outcome: Ongoing, Progressing  12/21/2020 1838 by Anh Altamirano RN  Outcome: Ongoing, Progressing  12/21/2020 1832 by Anh Altamirano RN  Outcome: Ongoing, Progressing  Goal: Absence of Hospital-Acquired Illness or Injury  12/21/2020 1846 by Anh Altamirano RN  Outcome: Ongoing, Progressing  12/21/2020 1838 by Anh Altamirano RN  Outcome: Ongoing, Progressing  12/21/2020 1832 by Anh Altamirano RN  Outcome: Ongoing, Progressing  Intervention: Identify and Manage Fall Risk  Recent Flowsheet Documentation  Taken 12/21/2020 1740 by Anh Altamirano RN  Safety Promotion/Fall Prevention:   activity supervised   nonskid shoes/slippers when out of bed  Intervention: Prevent Skin Injury  Recent Flowsheet Documentation  Taken 12/21/2020 1740 by Anh Altamirano RN  Body Position:   turned   side-lying, right  Intervention: Prevent and Manage VTE (venous thromboembolism) Risk  Recent Flowsheet Documentation  Taken 12/21/2020 1740 by Anh Altamirano RN  VTE Prevention/Management:   bilateral   dorsiflexion/plantar flexion performed   sequential compression devices off   bleeding risk factor(s) identified   patient refused intervention  Goal: Optimal Comfort and Wellbeing  12/21/2020 1846 by Anh Altamirano RN  Outcome: Ongoing, Progressing  12/21/2020 1838 by Anh Altamirano RN  Outcome: Ongoing, Progressing  12/21/2020 1832 by Anh Altamirano RN  Outcome: Ongoing, Progressing  Intervention: Provide Person-Centered Care  Recent Flowsheet Documentation  Taken 12/21/2020 1740 by Anh Altamirano RN  Trust Relationship/Rapport:   care explained   choices provided   emotional support provided  Goal:  Readiness for Transition of Care  12/21/2020 1846 by Anh Altamirano, RN  Outcome: Ongoing, Progressing  12/21/2020 1838 by Anh Altamirano, RN  Outcome: Ongoing, Progressing  12/21/2020 1832 by Anh Altamirano, RN  Outcome: Ongoing, Progressing  Intervention: Mutually Develop Transition Plan  Recent Flowsheet Documentation  Taken 12/21/2020 1727 by Anh Altamirano, RN  Equipment Currently Used at Home: none  Transportation Anticipated: family or friend will provide  Transportation Concerns: car, none  Patient/Family Anticipated Services at Transition: none  Patient/Family Anticipates Transition to: home with family   Goal Outcome Evaluation:

## 2020-12-21 NOTE — PLAN OF CARE
Patient complains of pain in neck being a 5 out out of 10 treated with 1x dose dilaudid here and 1x dose in ER. A/Ox4. Family at home with  and child (22yr). Claims to have a great support system. Hep gtt running and mgmt of clot for now with heparin.   Problem: Adult Inpatient Plan of Care  Goal: Plan of Care Review  Outcome: Ongoing, Progressing  Goal: Patient-Specific Goal (Individualized)  Outcome: Ongoing, Progressing  Goal: Absence of Hospital-Acquired Illness or Injury  Outcome: Ongoing, Progressing  Intervention: Identify and Manage Fall Risk  Recent Flowsheet Documentation  Taken 12/21/2020 1740 by Anh Altamirano RN  Safety Promotion/Fall Prevention:   activity supervised   nonskid shoes/slippers when out of bed  Intervention: Prevent Skin Injury  Recent Flowsheet Documentation  Taken 12/21/2020 1740 by Anh Altamirano RN  Body Position:   turned   side-lying, right  Intervention: Prevent and Manage VTE (venous thromboembolism) Risk  Recent Flowsheet Documentation  Taken 12/21/2020 1740 by Anh Altamirano RN  VTE Prevention/Management:   bilateral   dorsiflexion/plantar flexion performed   sequential compression devices off   bleeding risk factor(s) identified   patient refused intervention  Goal: Optimal Comfort and Wellbeing  Outcome: Ongoing, Progressing  Intervention: Provide Person-Centered Care  Recent Flowsheet Documentation  Taken 12/21/2020 1740 by Anh Altamirano RN  Trust Relationship/Rapport:   care explained   choices provided   emotional support provided  Goal: Readiness for Transition of Care  Outcome: Ongoing, Progressing  Intervention: Mutually Develop Transition Plan  Recent Flowsheet Documentation  Taken 12/21/2020 1727 by Anh Altamirano RN  Equipment Currently Used at Home: none  Transportation Anticipated: family or friend will provide  Transportation Concerns: car, none  Patient/Family Anticipated Services at Transition: none  Patient/Family Anticipates Transition to: home with family    Goal Outcome Evaluation:

## 2020-12-21 NOTE — CONSULTS
The patient is a 40-year-old female with a history of stage IV metastatic colon cancer who has been undergoing chemotherapy.  She has a history of factor V Leiden hypercoagulability but also pulmonary embolism.  She previously had placement of a left subclavian port and has been having some issues with pain with infusions more recently.  She had removal of the left subclavian port and placement of a new right internal jugular port 3 days ago, December 18, 2020..    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  CT scan of the chest with contrast today demonstrates the same finding, along with multiple dilated collaterals including the hemiazygos system, intercostal veins and internal mammary veins.  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  There appears to be new thrombus in the right subclavian vein and its junction with the superior vena cava.    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  The port is obviously needed for her chemotherapy.  She also needs chronic anticoagulation because of her factor V Leiden hypercoagulable state with previous pulmonary embolism,  but also hypercoagulability related to her malignancy.  I will defer management of that to the hematology oncology service.  The only potential option from the vascular surgery service would be removal of the right internal jugular port with thrombolysis/thrombectomy  And placing the port back once again.  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  There is high probability that rethrombosis would occur in the superior vena cava and subclavian vein because of the port coursing through these veins.  Therefore, anticoagulation only at this time, but consider the above if her  swelling does not improve.

## 2020-12-21 NOTE — ED PROVIDER NOTES
I have supervised the care provided by the midlevel provider.    We have discussed this patient's history, physical exam, and treatment plan.   I have reviewed the note and have personally examined the patient and agree with the plan of care.  See attached attending note.  My personal findings are below:    Patient complains of bilateral arm swelling and facial swelling for the past 3 days.  Symptoms began after the patient had a port removed from her left chest and a new one placed in her right chest.  Patient has stage IV colon cancer and is currently receiving chemotherapy.  Last treatment was roughly 2 weeks ago.  Patient also complains of increased shortness of breath over the past several days.  Symptoms are worse with exertion and with lying flat.  She had a mild nonproductive cough.  She also reports soreness in her chest but denies fever, chills, leg swelling, or calf pain.  Patient is on Xarelto, but held it for 2 days last week prior to surgery.    On exam: Awake and alert.  Heart is tachycardic but regular.  There are diminished breath sounds on the right.  Abdomen is soft and nontender.  There are 2 incisions in the upper chest without signs of infection.  There is moderate edema of the right arm and mild edema of the left arm.  No pedal edema or calf tenderness.    Plan is to obtain labs, EKG, and CT of the chest.    EKG          EKG time: 11:04 AM  Rhythm/Rate: Sinus tachycardia, rate 127  P waves and IL: Normal  QRS, axis: Normal  ST and T waves: Nonspecific T wave changes laterally and inferiorly    Interpreted Contemporaneously by me, independently viewed  EKG is changed compared to prior EKG done 9/20/2019.  Rate was 88 at that time..  No new ischemic changes.       Red Kelley MD  12/21/20 [id number]

## 2020-12-22 LAB
ANION GAP SERPL CALCULATED.3IONS-SCNC: 11.5 MMOL/L (ref 5–15)
APTT PPP: 55.1 SECONDS (ref 22.7–35.4)
APTT PPP: 86.6 SECONDS (ref 22.7–35.4)
APTT PPP: 90.5 SECONDS (ref 22.7–35.4)
BASOPHILS # BLD AUTO: 0.03 10*3/MM3 (ref 0–0.2)
BASOPHILS NFR BLD AUTO: 0.4 % (ref 0–1.5)
BUN SERPL-MCNC: 10 MG/DL (ref 6–20)
BUN/CREAT SERPL: 13 (ref 7–25)
CALCIUM SPEC-SCNC: 8.6 MG/DL (ref 8.6–10.5)
CHLORIDE SERPL-SCNC: 100 MMOL/L (ref 98–107)
CO2 SERPL-SCNC: 22.5 MMOL/L (ref 22–29)
CREAT SERPL-MCNC: 0.77 MG/DL (ref 0.57–1)
DEPRECATED RDW RBC AUTO: 60.2 FL (ref 37–54)
EOSINOPHIL # BLD AUTO: 0.25 10*3/MM3 (ref 0–0.4)
EOSINOPHIL NFR BLD AUTO: 3.5 % (ref 0.3–6.2)
ERYTHROCYTE [DISTWIDTH] IN BLOOD BY AUTOMATED COUNT: 17.6 % (ref 12.3–15.4)
GFR SERPL CREATININE-BSD FRML MDRD: 83 ML/MIN/1.73
GLUCOSE SERPL-MCNC: 115 MG/DL (ref 65–99)
HCT VFR BLD AUTO: 34.5 % (ref 34–46.6)
HGB BLD-MCNC: 12 G/DL (ref 12–15.9)
IMM GRANULOCYTES # BLD AUTO: 0.03 10*3/MM3 (ref 0–0.05)
IMM GRANULOCYTES NFR BLD AUTO: 0.4 % (ref 0–0.5)
LYMPHOCYTES # BLD AUTO: 0.92 10*3/MM3 (ref 0.7–3.1)
LYMPHOCYTES NFR BLD AUTO: 12.9 % (ref 19.6–45.3)
MCH RBC QN AUTO: 33 PG (ref 26.6–33)
MCHC RBC AUTO-ENTMCNC: 34.8 G/DL (ref 31.5–35.7)
MCV RBC AUTO: 94.8 FL (ref 79–97)
MONOCYTES # BLD AUTO: 1.13 10*3/MM3 (ref 0.1–0.9)
MONOCYTES NFR BLD AUTO: 15.9 % (ref 5–12)
NEUTROPHILS NFR BLD AUTO: 4.76 10*3/MM3 (ref 1.7–7)
NEUTROPHILS NFR BLD AUTO: 66.9 % (ref 42.7–76)
NRBC BLD AUTO-RTO: 0 /100 WBC (ref 0–0.2)
PLATELET # BLD AUTO: 254 10*3/MM3 (ref 140–450)
PMV BLD AUTO: 10.4 FL (ref 6–12)
POTASSIUM SERPL-SCNC: 3.8 MMOL/L (ref 3.5–5.2)
RBC # BLD AUTO: 3.64 10*6/MM3 (ref 3.77–5.28)
SARS-COV-2 ORF1AB RESP QL NAA+PROBE: NOT DETECTED
SODIUM SERPL-SCNC: 134 MMOL/L (ref 136–145)
WBC # BLD AUTO: 7.12 10*3/MM3 (ref 3.4–10.8)

## 2020-12-22 PROCEDURE — 99255 IP/OBS CONSLTJ NEW/EST HI 80: CPT | Performed by: INTERNAL MEDICINE

## 2020-12-22 PROCEDURE — 36415 COLL VENOUS BLD VENIPUNCTURE: CPT | Performed by: NURSE PRACTITIONER

## 2020-12-22 PROCEDURE — 85730 THROMBOPLASTIN TIME PARTIAL: CPT | Performed by: NURSE PRACTITIONER

## 2020-12-22 PROCEDURE — 25010000002 HEPARIN (PORCINE) PER 1000 UNITS: Performed by: NURSE PRACTITIONER

## 2020-12-22 PROCEDURE — 25010000002 HYDROMORPHONE PER 4 MG: Performed by: NURSE PRACTITIONER

## 2020-12-22 PROCEDURE — U0004 COV-19 TEST NON-CDC HGH THRU: HCPCS | Performed by: INTERNAL MEDICINE

## 2020-12-22 PROCEDURE — 80048 BASIC METABOLIC PNL TOTAL CA: CPT | Performed by: INTERNAL MEDICINE

## 2020-12-22 PROCEDURE — 85730 THROMBOPLASTIN TIME PARTIAL: CPT | Performed by: INTERNAL MEDICINE

## 2020-12-22 PROCEDURE — 85025 COMPLETE CBC W/AUTO DIFF WBC: CPT | Performed by: NURSE PRACTITIONER

## 2020-12-22 RX ADMIN — HEPARIN SODIUM 16.7 UNITS/KG/HR: 10000 INJECTION, SOLUTION INTRAVENOUS at 07:54

## 2020-12-22 RX ADMIN — HYDROCODONE BITARTRATE AND ACETAMINOPHEN 1 TABLET: 7.5; 325 TABLET ORAL at 21:14

## 2020-12-22 RX ADMIN — SODIUM CHLORIDE, PRESERVATIVE FREE 10 ML: 5 INJECTION INTRAVENOUS at 08:12

## 2020-12-22 RX ADMIN — FAMOTIDINE 20 MG: 20 TABLET, FILM COATED ORAL at 08:12

## 2020-12-22 RX ADMIN — DICLOFENAC SODIUM 4 G: 10 GEL TOPICAL at 16:21

## 2020-12-22 RX ADMIN — HYDROMORPHONE HYDROCHLORIDE 0.5 MG: 1 INJECTION, SOLUTION INTRAMUSCULAR; INTRAVENOUS; SUBCUTANEOUS at 14:56

## 2020-12-22 RX ADMIN — MIRTAZAPINE 15 MG: 15 TABLET, ORALLY DISINTEGRATING ORAL at 21:14

## 2020-12-22 RX ADMIN — DICLOFENAC SODIUM 4 G: 10 GEL TOPICAL at 08:15

## 2020-12-22 RX ADMIN — FAMOTIDINE 20 MG: 20 TABLET, FILM COATED ORAL at 21:14

## 2020-12-22 RX ADMIN — DICLOFENAC SODIUM 4 G: 10 GEL TOPICAL at 21:15

## 2020-12-22 RX ADMIN — CETIRIZINE HYDROCHLORIDE 10 MG: 10 TABLET ORAL at 08:12

## 2020-12-22 NOTE — NURSING NOTE
WOCN Consult: Patient has had ostomy for over 1 year.  She has her own supplies and changes pouch per self.  Patient will call if she needs any assistance.

## 2020-12-22 NOTE — PAYOR COMM NOTE
"Carole Weaver (40 y.o. Female)     PLEASE SEE ATTACHED CLINICAL REVIEW.     REF#[id number]    PLEASE CALL  OR  [phone] WITH INPT AUTH AND DAYS APPROVED.     THANK YOU    UZIEL ZENDEJAS LPN CCP    Date of Birth Social Security Number Address Home Phone MRN    1980  [id number] [facility] [phone] [id number]    Sikhism Marital Status          Adventism        Admission Date Admission Type Admitting Provider Attending Provider Department, Room/Bed    20 Emergency Stingl, MD Chandler Abbasi Abhishek, MD [address], [address]    Discharge Date Discharge Disposition Discharge Destination                       Attending Provider: Greg Arteaga MD    Allergies: No Known Allergies    Isolation: Enh Drop/Con   Infection: COVID (rule out) (20)   Code Status: CPR    Ht: 166 cm (65.35\")   Wt: 103 kg (226 lb)    Admission Cmt: None   Principal Problem: None                Active Insurance as of 2020     Primary Coverage     Payor Plan Insurance Group Employer/Plan Group    ANTHEM BLUE CROSS ANTHEM BLUE CROSS BLUE SHIELD PPO [id number]     Payor Plan Address Payor Plan Phone Number Payor Plan Fax Number Effective Dates    [address] [phone]  2018 - None Entered    [facility] [id number]       Subscriber Name Subscriber Birth Date Member ID       JUSTUS WEAVER 1980 [id number]                 Emergency Contacts      (Rel.) Home Phone Work Phone Mobile Phone    Justus Weaver (Spouse) [phone] -- --    DENIS CRONIN (Mother) [phone] -- [phone]               History & Physical      StingToya lópez MD at 19          HISTORY AND PHYSICAL   [facility]        Patient Identification:  Name: Carole Weaver  Age: 40 y.o.  Sex: female  :  1980  MRN: [id number]                     Primary Care Physician: Deepika Vela III, " NPCarloC    Chief Complaint:  40 year old female who presented to the emergency room with swelling of her neck and pain of her right arm; she has also been short of air; she has a history of rectal cancer and is undergoing chemotherapy; her port was recently removed from the left side and moved to the right; she has been on xarelto because of a history of pe and factor v leiden mutation; she denies fever or chills; workup revealed dvts and she was started on heparin and admitted    History of Present Illness:   As above    Past Medical History:  Past Medical History:   Diagnosis Date   • Abdominopelvic abscess (CMS/HCC) 08/12/2020    ADMITTED TO [facility]   • Abnormal Pap smear of cervix 02/02/1998    JULIUS I   • Anemia in pregnancy    • Anxiety    • Bilateral epiphora 04/2020   • Bilateral hand swelling 05/02/2020    SEEN AT [facility] ER   • Cervical lymphadenitis 06/06/2012    SEEN AT [facility] ER   • Chemotherapy induced neutropenia (CMS/HCC) 04/2020   • Chemotherapy-induced nausea 02/2020   • Chemotherapy-induced thrombocytopenia 05/2020   • Chronic diarrhea    • Colon polyps     FOLLOWED BY DR. GERONIMO ESPARZA   • Cystitis 04/24/2020    WITH DEHYDRATION, ADMITTED TO [facility]   • Cystitis 10/27/2020   • Drug-induced peripheral neuropathy (CMS/HCC) 05/2020   • Fistula of intestine    • GERD (gastroesophageal reflux disease)    • Hand foot syndrome 05/2020   • Heart murmur     IN CHILDHOOD   • Hematochezia    • Hemorrhoids    • Heterozygous factor V Leiden mutation (CMS/[facility])     DX 8-2-2019   • History of anemia    • History of chemotherapy 2019    FOLLOWED BY DR. ALEXANDRU HUNT   • History of gestational diabetes    • History of pre-eclampsia    • History of radiation therapy 2019    FOLLOWED BY DR. JAVON LEWIS   • History of TB skin testing 01/17/2009    TB Skin Test   • HPV in female 1998   • Hypokalemia 09/2019   • Hypomagnesemia 09/2019   • IBS (irritable bowel syndrome)    • Ileostomy in place (CMS/[facility])     FOLLOWED BY DR. GERONIMO ESPARZA    • Infected insect bite of neck 2016    SEEN AT [facility]   • Kidney stones 2007    SEEN AT [facility] ER   • Lump of right breast     SWOLLEN LYMPH NODE   • Lung cancer (CMS/HCC) 2020    METASTATIC LUNG CANCER   • Monopolar depression (CMS/HCC)    • On anticoagulant therapy    • Perirectal abscess 2020   • PIH (pregnancy induced hypertension)    • Pulmonary embolism without acute cor pulmonale (CMS/HCC) 09/20/2019    X 3; ADMITTED TO [facility]   • Pulmonary nodule, right 2020   • Rectal cancer (CMS/HCC) 2019    STAGE IIA, INVASIVE MODERATELY DIFFERENTIATED ADENOCARCINOMA, CHEMO AND XRT FINISHED 2019   • Right shoulder pain 2020    SEEN AT [facility] ER   • Right ureteral stone 10/01/2019    SEEN AT [facility] ER   • SAB (spontaneous ) 1996     A2-1 INDUCED   • Sciatica    • Sepsis due to cellulitis (CMS/HCC) 2002    LEFT GREAT TOE, ADMITTED TO [facility]   • Tachycardia 2020   • Urinary urgency 2020     Past Surgical History:  Past Surgical History:   Procedure Laterality Date   • CHOLECYSTECTOMY N/A 10/10/2003    LAPAROSCOPIC WITH CHOLANGIOGRAM, DR. JAMEY TALAVERA AT [facility]   • COLON RESECTION N/A 2019    Procedure: laparoscopic low anterior colon resection with mobilization of splenic flexure and diverting loop ileostomy: ERAS;  Surgeon: Padmaja Esparza MD;  Location: [facility];  Service: General   • COLON RESECTION N/A 8/3/2020    Procedure: LOW ANTERIOR COLON RESECTION, COLOANAL ANASTOMOSIS, MOBILIZATION SPLENIC FLEXURE;  Surgeon: Padmaja Esparza MD;  Location: [facility] OR;  Service: General;  Laterality: N/A;   • COLONOSCOPY N/A 7/15/2020    PATENT ANASTAMOSIS IN MID RECTUM, RESCOPE IN 1 YR, DR. PADMAJA ESPARZA AT [facility]   • COLONOSCOPY W/ POLYPECTOMY N/A 2019    15 MM TUBULOVILLOUS ADENOMA POLYP IN HEPATIC FLEXURE, 20 MMTUBULOVILLOUS ADENOMA WITH HIGH GRADE DYSPLASIA IN RECTOSIGMOID, 4 CM MASS IN MID RECTUM, PATH: INVASIVE MODERATELY DIFFERENTIATED  ADENOCARCINOMA, DR. JENNIFER LI AT [facility]   • DILATATION AND EVACUATION N/A 2009   • ENDOSCOPY N/A 07/08/2019    LA GRADE A ESOPHAGITIS, GASTRITIS, ALL BIOPSIES BENIGN, DR. JENNIFER LI AT [facility]   • INCISION AND DRAINAGE PERIRECTAL ABSCESS N/A 8/14/2020    Procedure: INCISION AND DRAINAGE OF retrorectal dehiscence abcess with drain placement and irrigation;  Surgeon: Geronimo Ye MD;  Location: [facility] OR;  Service: General;  Laterality: N/A;   • PAP SMEAR N/A 01/24/2014   • SIGMOIDOSCOPY N/A 7/24/2019    INFILTRATIVE PARTIALLY OBSTRUCTING LARGE RECTAL CANCER, AREA TATOOED, DR. GERONIMO YE AT [facility]   • SIGMOIDOSCOPY N/A 11/23/2019    AN ULCERATED PARTIALLY OBSTRUCTING MASS IN MID RECTUM, AREA TATTOOED, DR. GERONIMO YE AT [facility]   • TRANSRECTAL ULTRASOUND N/A 7/24/2019    Procedure: ULTRASOUND TRANSRECTAL;  Surgeon: Geronimo Ye MD;  Location: [facility] ENDOSCOPY;  Service: General   • URETEROSCOPY LASER LITHOTRIPSY WITH STENT INSERTION Right 10/30/2019    DR. ESTUARDO BEASLEY AT [place]   • VAGINAL DELIVERY N/A 09/18/1998   • VENOUS ACCESS DEVICE (PORT) INSERTION Left 8/9/2019    Procedure: INSERTION VENOUS ACCESS DEVICE;  Surgeon: Sj Joseph MD;  [place] [facility] [place] [facility]  Service: General   • VENOUS ACCESS DEVICE (PORT) INSERTION N/A 12/18/2020    Procedure: INSERTION VENOUS ACCESS DEVICE right side, removal venous access device left side;  Surgeon: Sj Joseph MD;  [place] [facility] [place] [facility]  Service: General;  Laterality: N/A;   • WISDOM TOOTH EXTRACTION Bilateral 1993      Home Meds:  Medications Prior to Admission   Medication Sig Dispense Refill Last Dose   • clonazePAM (KlonoPIN) 1 MG tablet Take 1 tablet by mouth 3 (Three) Times a Day As Needed for Anxiety or Seizures. 90 tablet 0    • Diclofenac Sodium (VOLTAREN) 1 % gel gel Apply 4 g topically to the appropriate area as directed 3 (Three) Times a Day. 150 g 3    • dicyclomine (BENTYL) 20 MG tablet TAKE 1  TABLET BY MOUTH EVERY 6 HOURS AS NEEDED 360 tablet 0    • diphenoxylate-atropine (LOMOTIL) 2.5-0.025 MG per tablet Take 1 tablet by mouth 4 (Four) Times a Day As Needed for Diarrhea. 120 tablet 1    • famotidine (PEPCID) 20 MG tablet TAKE 1 TABLET BY MOUTH TWICE A DAY (Patient taking differently: Take 20 mg by mouth 2 (Two) Times a Day.) 180 tablet 1    • HYDROcodone-acetaminophen (Norco) 5-325 MG per tablet 1-2 by mouth every four hours as needed for pain 24 tablet 0    • HYDROcodone-acetaminophen (NORCO) 7.5-325 MG per tablet Take 1 tablet by mouth Every 6 (Six) Hours As Needed for Moderate Pain . 240 tablet 0    • Loratadine (CLARITIN) 10 MG capsule Take 10 mg by mouth Every Evening.      • mirtazapine (REMERON SOL-TAB) 15 MG disintegrating tablet Place 1 tablet on the tongue Every Night. 90 tablet 0    • ondansetron (Zofran) 4 MG tablet Take 1 tablet by mouth Every 6 (Six) Hours As Needed for Nausea or Vomiting for up to 10 doses. 10 tablet 1    • ondansetron (ZOFRAN) 8 MG tablet Take 1 tablet by mouth 3 (Three) Times a Day As Needed for Nausea or Vomiting. 60 tablet 5    • prochlorperazine (COMPAZINE) 10 MG tablet Take 1 tablet by mouth Every 6 (Six) Hours As Needed for Nausea or Vomiting. 30 tablet 2    • rivaroxaban (Xarelto) 20 MG tablet Take 1 tablet by mouth Daily. 90 tablet 3        Allergies:  No Known Allergies  Immunizations:  Immunization History   Administered Date(s) Administered   • flucelvax quad pfs =>4 YRS 11/08/2018     Social History:   Social History     Social History Narrative   • Not on file     Social History     Socioeconomic History   • Marital status:      Spouse name: Justus   • Number of children: 1   • Years of education: College   • Highest education level: Not on file   Occupational History   • Occupation:      Comment: [employer]     Employer: [employer]   Tobacco Use   • Smoking status: Heavy Tobacco Smoker     Packs/day: 1.00     Years: 24.00     Pack  years: 24.00     Types: Electronic Cigarette, Cigarettes   • Smokeless tobacco: Never Used   • Tobacco comment: LAST CIGARETTE 2020   Substance and Sexual Activity   • Alcohol use: Not Currently   • Drug use: No   • Sexual activity: Defer       Family History:  Family History   Problem Relation Age of Onset   • Hyperlipidemia Mother    • Colon polyps Mother    • Heart disease Mother    • Hypertension Mother    • Factor V Leiden deficiency Mother    • Skin cancer Father         Squamous in 60s   • Heart disease Paternal Grandfather    • No Known Problems Son    • Cancer Paternal Uncle    • Colon cancer Paternal Uncle    • Diabetes Paternal Uncle    • Mental illness Sister         Addiction   • Colon cancer Maternal Uncle    • Malig Hyperthermia Neg Hx         Review of Systems  See history of present illness and past medical history.  Patient denies headache, dizziness, syncope, falls, trauma, change in vision, change in hearing, change in taste, changes in weight, changes in appetite, focal weakness, numbness, or paresthesia.  Patient denies chest pain, palpitations, dyspnea, orthopnea, PND, cough, sinus pressure, rhinorrhea, epistaxis, hemoptysis, nausea, vomiting,hematemesis, diarrhea, constipation or hematchezia.  Denies cold or heat intolerance, polydipsia, polyuria, polyphagia. Denies hematuria, pyuria, dysuria, hesitancy, frequency or urgency. Denies consumption of raw and under cooked meats foods or change in water source.  Denies fever, chills, sweats, night sweats.  Denies missing any routine medications. Remainder of ROS is negative.    Objective:  T Max 24 hrs: Temp (24hrs), Av.3 °F (37.4 °C), Min:98.6 °F (37 °C), Max:99.9 °F (37.7 °C)    Vitals Ranges:   Temp:  [98.6 °F (37 °C)-99.9 °F (37.7 °C)] 99.9 °F (37.7 °C)  Heart Rate:  [122-147] 122  Resp:  [14-16] 14  BP: (121-146)/(62-99) 138/82      Exam:  /82 (BP Location: Left leg, Patient Position: Lying)   Pulse (!) 122   Temp 99.9 °F  "(37.7 °C) (Oral)   Resp 14   Ht 166 cm (65.35\")   Wt 101 kg (223 lb)   SpO2 94%   BMI 36.71 kg/m²     General Appearance:    Alert, cooperative, no distress, appears stated age   Head:    Normocephalic, without obvious abnormality, atraumatic   Eyes:    PERRL, conjunctivae/corneas clear, EOM's intact, both eyes   Ears:    Normal external ear canals, both ears   Nose:   Nares normal, septum midline, mucosa normal, no drainage    or sinus tenderness   Throat:   Lips, mucosa, and tongue normal   Neck:   Supple, symmetrical, trachea midline, no adenopathy;     thyroid:  no enlargement/tenderness/nodules; no carotid    bruit or JVD; swelling right supraclavicular area   Back:     Symmetric, no curvature, ROM normal, no CVA tenderness   Lungs:     Decreased breath sounds bilaterally, respirations unlabored   Chest Wall:    No tenderness or deformity    Heart:    Regular rate and rhythm, S1 and S2 normal, no murmur, rub   or gallop   Abdomen:     Soft, nontender, bowel sounds active all four quadrants,     no masses, no hepatomegaly, no splenomegaly   Extremities:   Extremities normal, atraumatic, no cyanosis or edema   Pulses:   2+ and symmetric all extremities   Skin:   Skin color, texture, turgor normal, no rashes or lesions   Lymph nodes:   Cervical, supraclavicular, and axillary nodes normal   Neurologic:   CNII-XII intact, normal strength, sensation intact throughout      .    Patient Active Problem List   Diagnosis Code   • Anxiety F41.9   • Irritable bowel syndrome K58.9   • Immunization due Z23   • Monopolar depression (CMS/HCC) F33.9   • Adenocarcinoma of rectum (CMS/HCC) C20   • Family history of factor V Leiden mutation Z83.2   • Heterozygous factor V Leiden mutation (CMS/HCC) D68.51   • Encounter for fitting and adjustment of vascular catheter Z45.2   • Functional diarrhea K59.1   • Adverse effect of antineoplastic and immunosuppressive drugs, initial encounter T45.1X5A   • Hypokalemia E87.6   • " Hypomagnesemia E83.42   • Other pulmonary embolism without acute cor pulmonale (CMS/HCC) I26.99   • UTI (urinary tract infection) N39.0   • Kidney stone on right side N20.0   • Hydronephrosis with ureteropelvic junction (UPJ) obstruction Q62.11   • Partial intestinal obstruction, unspecified as to cause (CMS/HCC) K56.600   • Irregular heart beat I49.9   • Hemorrhoids, external K64.4   • Hematochezia K92.1   • Gastrointestinal hemorrhage, unspecified K92.2   • Esophagitis K20.90   • Family history of colonic polyps Z83.71   • Chronic diarrhea K52.9   • Calculus of gallbladder K80.20   • Benign neoplasm of transverse colon D12.3   • Benign neoplasm of rectum and anal canal D12.8, D12.9   • Loss of weight R63.4   • Heart murmur R01.1   • Anemia D64.9   • Anticoagulation adequate Z79.01   • Pain associated with surgical procedure G89.18   • Iron deficiency E61.1   • Adverse effect of iron T45.4X5A   • Drug-induced peripheral neuropathy (CMS/HCC) G62.0   • On antineoplastic chemotherapy Z79.899   • Chemotherapy-induced thrombocytopenia D69.59, T45.1X5A   • HTN (hypertension) I10   • Ileostomy present (CMS/HCC) Z93.2   • Chronic anticoagulation Z79.01   • Chemotherapy-induced neutropenia (CMS/HCC) D70.1, T45.1X5A   • Hand foot syndrome L27.1   • Chemotherapy-induced thrombocytopenia D69.59, T45.1X5A   • Pulmonary nodule, right R91.1   • Abdominopelvic abscess (CMS/HCC) K65.1   • Perirectal abscess K61.1   • Pulmonary nodules R91.8   • Secondary cancer of lung (CMS/HCC) C78.00   • Leukocytopenia D72.819   • 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  She is swollen through neck and face.  Hands are swollen.  Face is discolored.  Pt has colon cancer and is getting chemo    Patient was placed in face mask during first look triage.  Patient was wearing a face mask throughout encounter.  I wore personal protective equipment throughout the encounter.  Hand hygiene was performed before and after patient encounter.       Electronically signed by Degonda, Janet, RN at 12/21/20 1006     Rosalie Sanon APRN at 12/21/20 1044     Attestation signed by Red Kelley MD at 12/21/20 1823          For this patient encounter, I reviewed the NP or PA documentation, treatment plan, and medical decision making. Red Kelley MD 12/21/2020 18:23 EST                   EMERGENCY DEPARTMENT ENCOUNTER    Room Number:  17/17  Date of encounter:  12/21/2020  PCP: Deepika Vela III, NP-C  Historian: Patient  Oncologist: Dr. Nguyen  Surgeon: Dr. Joseph      PPE    Patient was placed in face mask in first look. Patient was wearing facemask when I entered the room and throughout our encounter. I wore full protective equipment throughout this patient encounter including a face mask, and gloves. Hand hygiene was performed before donning protective equipment and after removal when leaving the room.        HPI:  Chief Complaint: Edema  A complete HPI/ROS/PMH/PSH/SH/FH are unobtainable due to: Nothing    Context: Carole Weaver is a 40 y.o. female who arrives to the ED via private vehicle.  Patient has a history of stage IV colon cancer which she is currently receiving chemo for.  Patient presents with c/o moderate, ongoing, swelling to her neck and upper extremities that she noticed Friday night.   Patient also complains of shortness of breath.  Patient states that she had her port site changed  Friday morning, it was removed from the left and replaced on the right.  She states Friday night she noticed some mild swelling to her arms, hands and neck.  She states that after chemo she normally will have some swelling therefore she did not think much about it initially.  She states however throughout the weekend the swelling has progressed.  She states it is hard for her to lie flat without becoming very short of breath, any exertion also causes shortness of breath.  Patient denies fever, chills, chest pain, nausea, vomiting, dysuria.  Patient states that nothing makes the symptoms better and exertion and lying flat worsens symptoms.          PAST MEDICAL HISTORY  Active Ambulatory Problems     Diagnosis Date Noted   • Anxiety 08/09/2016   • Irritable bowel syndrome 08/09/2016   • Immunization due 11/08/2018   • Monopolar depression (CMS/[facility]) 05/09/2019   • Adenocarcinoma of rectum (CMS/[facility]) 07/12/2019   • Family history of factor V Leiden mutation 08/01/2019   • Heterozygous factor V Leiden mutation (CMS/[facility]) 08/02/2019   • Encounter for fitting and adjustment of vascular catheter 08/07/2019   • Functional diarrhea 09/03/2019   • Adverse effect of antineoplastic and immunosuppressive drugs, initial encounter 09/03/2019   • Hypokalemia 09/03/2019   • Hypomagnesemia 09/03/2019   • Other pulmonary embolism without acute cor pulmonale (CMS/[facility]) 09/20/2019   • UTI (urinary tract infection) 09/20/2019   • Kidney stone on right side 10/07/2019   • Hydronephrosis with ureteropelvic junction (UPJ) obstruction 10/15/2019   • Partial intestinal obstruction, unspecified as to cause (CMS/[facility]) 07/08/2019   • Irregular heart beat 12/10/2019   • Hemorrhoids, external 12/10/2019   • Hematochezia 12/10/2019   • Gastrointestinal hemorrhage, unspecified 07/08/2019   • Esophagitis 07/08/2019   • Family history of colonic polyps 12/10/2019   • Chronic diarrhea 12/10/2019   • Calculus of gallbladder 12/10/2019   • Benign neoplasm of  transverse colon 07/08/2019   • Benign neoplasm of rectum and anal canal 07/08/2019   • Loss of weight 12/10/2019   • Heart murmur 12/10/2019   • Anemia 01/22/2020   • Anticoagulation adequate 01/22/2020   • Pain associated with surgical procedure 01/29/2020   • Iron deficiency 02/05/2020   • Adverse effect of iron 02/05/2020   • Drug-induced peripheral neuropathy (CMS/HCC) 04/15/2020   • On antineoplastic chemotherapy 04/25/2020   • Chemotherapy-induced thrombocytopenia 04/25/2020   • HTN (hypertension) 04/25/2020   • Ileostomy present (CMS/HCC) 04/25/2020   • Chronic anticoagulation 04/25/2020   • Chemotherapy-induced neutropenia (CMS/HCC) 04/29/2020   • Hand foot syndrome 05/13/2020   • Chemotherapy-induced thrombocytopenia 05/13/2020   • Pulmonary nodule, right 05/13/2020   • Abdominopelvic abscess (CMS/HCC) 08/12/2020   • Perirectal abscess 08/12/2020   • Pulmonary nodules 09/16/2020   • Secondary cancer of lung (CMS/HCC) 10/05/2020   • Leukocytopenia 11/10/2020     Resolved Ambulatory Problems     Diagnosis Date Noted   • Rectal cancer (CMS/HCC) 11/01/2019   • Rectal cancer (CMS/HCC) 06/19/2020   • Rectal cancer (CMS/HCC) 06/19/2020     Past Medical History:   Diagnosis Date   • Abnormal Pap smear of cervix 02/02/1998   • Anemia in pregnancy    • Bilateral epiphora 04/2020   • Bilateral hand swelling 05/02/2020   • Cervical lymphadenitis 06/06/2012   • Chemotherapy induced neutropenia (CMS/HCC) 04/2020   • Chemotherapy-induced nausea 02/2020   • Colon polyps    • Cystitis 04/24/2020   • Cystitis 10/27/2020   • Fistula of intestine    • GERD (gastroesophageal reflux disease)    • Hemorrhoids    • History of anemia    • History of chemotherapy 2019   • History of gestational diabetes    • History of pre-eclampsia    • History of radiation therapy 2019   • History of TB skin testing 01/17/2009   • HPV in female 1998   • IBS (irritable bowel syndrome)    • Ileostomy in place (CMS/HCC)    • Infected insect bite of  neck 2016   • Kidney stones 2007   • Lump of right breast    • Lung cancer (CMS/HCC) 2020   • On anticoagulant therapy    • PIH (pregnancy induced hypertension)    • Pulmonary embolism without acute cor pulmonale (CMS/HCC) 2019   • Right shoulder pain 2020   • Right ureteral stone 10/01/2019   • SAB (spontaneous ) 1996   • Sciatica    • Sepsis due to cellulitis (CMS/HCC) 2002   • Tachycardia 2020   • Urinary urgency 2020         PAST SURGICAL HISTORY  Past Surgical History:   Procedure Laterality Date   • CHOLECYSTECTOMY N/A 10/10/2003    LAPAROSCOPIC WITH CHOLANGIOGRAM, DR. JAMEY TALAVERA AT [facility]   • COLON RESECTION N/A 2019    Procedure: laparoscopic low anterior colon resection with mobilization of splenic flexure and diverting loop ileostomy: ERAS;  Surgeon: Padmaja Esparza MD;  Location: [facility] OR;  Service: General   • COLON RESECTION N/A 8/3/2020    Procedure: LOW ANTERIOR COLON RESECTION, COLOANAL ANASTOMOSIS, MOBILIZATION SPLENIC FLEXURE;  Surgeon: Padmaja Esparza MD;  Location: [facility] OR;  Service: General;  Laterality: N/A;   • COLONOSCOPY N/A 7/15/2020    PATENT ANASTAMOSIS IN MID RECTUM, RESCOPE IN 1 YR, DR. PADMAJA ESPARZA AT [facility]   • COLONOSCOPY W/ POLYPECTOMY N/A 2019    15 MM TUBULOVILLOUS ADENOMA POLYP IN HEPATIC FLEXURE, 20 MMTUBULOVILLOUS ADENOMA WITH HIGH GRADE DYSPLASIA IN RECTOSIGMOID, 4 CM MASS IN MID RECTUM, PATH: INVASIVE MODERATELY DIFFERENTIATED ADENOCARCINOMA, DR. JENNIFER LI AT [facility]   • DILATATION AND EVACUATION N/A    • ENDOSCOPY N/A 2019    LA GRADE A ESOPHAGITIS, GASTRITIS, ALL BIOPSIES BENIGN, DR. JENNIFER LI AT [facility]   • INCISION AND DRAINAGE PERIRECTAL ABSCESS N/A 2020    Procedure: INCISION AND DRAINAGE OF retrorectal dehiscence abcess with drain placement and irrigation;  Surgeon: Padmaja Esparza MD;  Location: [facility];  Service: General;   Laterality: N/A;   • PAP SMEAR N/A 01/24/2014   • SIGMOIDOSCOPY N/A 7/24/2019    INFILTRATIVE PARTIALLY OBSTRUCTING LARGE RECTAL CANCER, AREA TATOOED, DR. GERONIMO ESPARZA AT [facility] [place]   • SIGMOIDOSCOPY N/A 11/23/2019    AN ULCERATED PARTIALLY OBSTRUCTING MASS IN MID RECTUM, AREA TATTOOED, DR. GERONIMO ESPARZA AT [facility]   • TRANSRECTAL ULTRASOUND N/A 7/24/2019    Procedure: ULTRASOUND TRANSRECTAL;  Surgeon: Geronimo Esparza MD;  Location: [facility] ENDOSCOPY;  Service: General   • URETEROSCOPY LASER LITHOTRIPSY WITH STENT INSERTION Right 10/30/2019    DR. ESTUARDO BEASLEY AT [place]   • VAGINAL DELIVERY N/A 09/18/1998   • VENOUS ACCESS DEVICE (PORT) INSERTION Left 8/9/2019    Procedure: INSERTION VENOUS ACCESS DEVICE;  Surgeon: Sj Joseph MD;  [facility];  Service: General   • VENOUS ACCESS DEVICE (PORT) INSERTION N/A 12/18/2020    Procedure: INSERTION VENOUS ACCESS DEVICE right side, removal venous access device left side;  Surgeon: Sj Joseph MD;  Location:  [facility];  Service: General;  Laterality: N/A;   • WISDOM TOOTH EXTRACTION Bilateral 1993         FAMILY HISTORY  Family History   Problem Relation Age of Onset   • Hyperlipidemia Mother    • Colon polyps Mother    • Heart disease Mother    • Hypertension Mother    • Factor V Leiden deficiency Mother    • Skin cancer Father         Squamous in 60s   • Heart disease Paternal Grandfather    • No Known Problems Son    • Cancer Paternal Uncle    • Colon cancer Paternal Uncle    • Diabetes Paternal Uncle    • Mental illness Sister         Addiction   • Colon cancer Maternal Uncle    • Malig Hyperthermia Neg Hx          SOCIAL HISTORY  Social History     Socioeconomic History   • Marital status:      Spouse name: Justus   • Number of children: 1   • Years of education: College   • Highest education level: Not on file   Occupational History   • Occupation:      Comment: [employer]     Employer: [employer]   Tobacco Use   • Smoking  status: Heavy Tobacco Smoker     Packs/day: 1.00     Years: 24.00     Pack years: 24.00     Types: Electronic Cigarette, Cigarettes   • Smokeless tobacco: Never Used   • Tobacco comment: LAST CIGARETTE 8/12/2020   Substance and Sexual Activity   • Alcohol use: Not Currently   • Drug use: No   • Sexual activity: Defer         ALLERGIES  Patient has no known allergies.        REVIEW OF SYSTEMS  Review of Systems     All systems reviewed and negative except for those discussed in HPI.        PHYSICAL EXAM    ED Triage Vitals   Temp Heart Rate Resp BP SpO2   12/21/20 1002 12/21/20 1002 12/21/20 1002 12/21/20 1028 12/21/20 1002   99.5 °F (37.5 °C) (!) 147 16 138/91 94 %       Physical Exam  GENERAL: Well appearing, non-toxic appearing, mildly distressed  HENT: normocephalic, atraumatic, dry mucous membranes  EYES: no scleral icterus, PERRL  CV: regular rhythm, tachycardic, no murmur  RESPIRATORY: normal effort, CTAB  ABDOMEN: soft, nontender  MUSCULOSKELETAL: no deformity  Patient has notable swelling to her face, neck, bilateral upper extremities including her hands, right is greater than the left  NEURO: alert, moves all extremities, follows commands, mental status normal/baseline  SKIN: warm, dry, no rash  Bruising noted to upper chest wall, surgical incision noted to right upper chest  Psych: Appropriate mood and affect  Nursing notes and vital signs reviewed      EKG      Independently viewed by me and interpreted by Dr Kelley         MEDICAL RECORD REVIEW  Medical records reviewed in epic, patient was last at her oncologist on 12/8/2020      PROCEDURES    Procedures        DIFFERENTIAL DIAGNOSIS  Differential diagnosis include but are not limited to the following: Superior vena cava syndrome, edema, PE, electrolyte abnormality      PROGRESS, DATA ANALYSIS, CONSULTS, AND MEDICAL DECISION MAKING        ED Course as of Dec 21 1710   Mon Dec 21, 2020   1050 Discussed with Dr. Solano, radiologist regarding patient's  symptoms, she recommends patient get a CT chest with contrast.    [MS]   1050 Discussed pertinent information from history and physical exam with patient.  Discussed differential diagnosis and plan for ED evaluation/work-up and treatment including labs, CT chest to evaluate for superior vena cava clot or other abnormalities in the chest.  All questions answered.  Patient is agreeable with this plan.        [MS]   1108 Reviewed pt's history and workup with Dr. Kelley.  After a bedside evaluation, they agree with the plan of care.          [MS]   1339  Discussed with Dr. 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  Discussed with patient the results and that she will need to be admitted to the hospital.  Patient verbalized understanding and is agreeable to this plan.    [MS]   1543 Discussed with Lyric Angelo, vascular surgeon on-call regarding patient's relevant history, exam, workup and ED findings/concerns.  Dr. Angelo agrees to see patient in consult.        [MS]   1559 Consult Note    Discussed care with Dr Ricardo  Reviewed patient's history, exam, results and need for admission secondary to SVC and left brachiocephalic thombosis  Dr. Ricardo accepts the patient to be admitted to telemetry inpatient bed.        [MS]      ED Course User Index  [MS] Rosalie Sanon, JULIET       ADMISSION    Discussed treatment plan and reason for admission with pt/family and admitting physician.  Pt/family voiced understanding of the plan for admission for further testing/treatment as needed.      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)   Tachycardia, unspecified   Shortness of breath         MEDICATIONS GIVEN IN ED    Medications   sodium chloride 0.9 % flush 10  mL (has no administration in time range)   sodium chloride 0.9 % flush 10 mL (has no administration in time range)   heparin 67823 units/250 mL (100 units/mL) in 0.45 % NaCl infusion (14.7 Units/kg/hr × 102 kg Intravenous New Bag 12/21/20 1520)   heparin (porcine) 5000 UNIT/ML injection 4,100-8,200 Units (has no administration in time range)   iopamidol (ISOVUE-300) 61 % injection 100 mL (85 mL Intravenous Given by Other 12/21/20 1247)   HYDROmorphone (DILAUDID) injection 0.5 mg (0.5 mg Intravenous Given 12/21/20 1514)   ondansetron (ZOFRAN) injection 4 mg (4 mg Intravenous Given 12/21/20 1518)   heparin (porcine) 5000 UNIT/ML injection 8,200 Units (8,200 Units Intravenous Given 12/21/20 1518)           COURSE & MEDICAL DECISION MAKING  Any/All labs and Any/All Imaging studies that were ordered were reviewed and are noted above.  Results were reviewed/discussed with the patient and they were also made aware of online assess.   Pt also made aware that some labs, such as cultures, will not be resulted during ER visit and follow up with PMD is necessary.        Rosalie Sanon, APRN  12/21/20 1711      Electronically signed by Red Kelley MD at 12/21/20 1823     Red Kelley MD at 12/21/20 1130        I have supervised the care provided by the midlevel provider.    We have discussed this patient's history, physical exam, and treatment plan.   I have reviewed the note and have personally examined the patient and agree with the plan of care.  See attached attending note.  My personal findings are below:    Patient complains of bilateral arm swelling and facial swelling for the past 3 days.  Symptoms began after the patient had a port removed from her left chest and a new one placed in her right chest.  Patient has stage IV colon cancer and is currently receiving chemotherapy.  Last treatment was roughly 2 weeks ago.  Patient also complains of increased shortness of breath over the past several  days.  Symptoms are worse with exertion and with lying flat.  She had a mild nonproductive cough.  She also reports soreness in her chest but denies fever, chills, leg swelling, or calf pain.  Patient is on Xarelto, but held it for 2 days last week prior to surgery.    On exam: Awake and alert.  Heart is tachycardic but regular.  There are diminished breath sounds on the right.  Abdomen is soft and nontender.  There are 2 incisions in the upper chest without signs of infection.  There is moderate edema of the right arm and mild edema of the left arm.  No pedal edema or calf tenderness.    Plan is to obtain labs, EKG, and CT of the chest.    EKG          EKG time: 11:04 AM  Rhythm/Rate: Sinus tachycardia, rate 127  P waves and MT: Normal  QRS, axis: Normal  ST and T waves: Nonspecific T wave changes laterally and inferiorly    Interpreted Contemporaneously by me, independently viewed  EKG is changed compared to prior EKG done 9/20/2019.  Rate was 88 at that time..  No new ischemic changes.       Red Kelley MD  12/21/20 1632      Electronically signed by Red Kelley MD at 12/21/20 1632     Violeta Byrd RN at 12/21/20 1210        Spoke with the IV team, a nurse in en rout to the ER for eval and placement of IV for CTA scan of this patient. As [facility] tech reports the left AC 18g iv does not flush when the arm is raised above her head for the scan. ARNP notified, charge nurse notified     Violeta Byrd RN  12/21/20 1212      Electronically signed by Violeta Byrd RN at 12/21/20 1212       {Outbreak/Travel/Exposure Documentation......;  Question Available Choices Patient Response   Outbreak Screen: Do you currently have a new onset of the following symptoms?        Fever/Chils, Cough, Shortness of air, Loss of taste or smell, No, Unknown  (!) Cough (12/21/20 1004)   Outbreak Screen: In the last 14 days, have you had contact with anyone who is ill, has show any of the symptoms listed above  and/or has been diagnosis with the 2019 Novel Coronavirus? This includes any immediate household members but excludes any patients with whom you have been in contact within your normal work duties wearing proper PPE, if you are a healthcare worker.  Yes, No, Unknown              Unknown (12/21/20 1004)   Outbreak Screen: Who was notified?    Free text  (not recorded)   Travel Screen: Have you traveled in the last month? If so, to what country have you traveled? If [place] what state? Yes, No, Unknown  List of all countries  List of all States No (12/21/20 1004)  (not recorded)  (not recorded)   Infection Risk: Do you currently have the following symptoms?  (If cough is selected, the Tuberculosis Screen is performed.) Cough, Fever, Rash, No (!) Cough (12/21/20 1004)   Tuberculosis Screen: Do you have any of the following Tuberculosis Risks?  · Have you lived or spent time with anyone who had or may have TB?  · Have you lived in or visited any of the following areas for more than one month: [place], [place], [place], [place] or [place], [place] or [place]?  · Do you have HIV/AIDS?  · Have you lived in or worked in a nursing home, homeless shelter, correctional facility, or substance abuse treatment facility?   · No    If Yes do you have any of the following symptoms? Yes responses display to the right    If Yes, symptoms listed are:  Cough greater than or equal to 3 weeks, Loss of appetite, Unexplained weight loss, Night sweats, Bloody sputum or hemoptysis, Hoarseness, Fever, Fatigue, Chest pain, No No (12/21/20 1004)  (not recorded)   Exposure Screen: Have you been exposed to any of these contagious diseases in the last month? Measles, Chickenpox, Meningitis, Pertussis, Whooping Cough, No No (12/21/20 1004)     Oxygen Therapy (since admission)     Date/Time   SpO2   Device (Oxygen Therapy)   Flow (L/min)   Oxygen Concentration (%)   ETCO2 (mmHg)    12/22/20 0700   95   nasal cannula   3   --   --    12/22/20  0057   --   nasal cannula   3   --   --    12/21/20 2320   94   --   --   --   --    12/21/20 2200   --   nasal cannula   3   --   --    12/21/20 2130   --   nasal cannula   2   --   --    12/21/20 1946   94   --   --   --   --    12/21/20 1740   --   nasal cannula   2   --   --    12/21/20 1717   93   nasal cannula   2   --   --    12/21/20 1650   95   --   --   --   --    12/21/20 1620   95   nasal cannula   2   --   --    12/21/20 15:22:20   97   nasal cannula   2   --   --    12/21/20 1208   96   --   --   --   --    12/21/20 1120   95   --   --   --   --    12/21/20 1111   95   --   --   --   --    12/21/20 1002   94   room air   --   --   --            Intake & Output (last 2 days)       12/20 0701 - 12/21 0700 12/21 0701 - 12/22 0700 12/22 0701 - 12/23 0700    Urine (mL/kg/hr)  0     Stool  350     Total Output  350     Net  -350            Urine Unmeasured Occurrence  3 x         Lines, Drains & Airways    Active LDAs     Name:   Placement date:   Placement time:   Site:   Days:    Peripheral IV 12/21/20 1027 Right Hand   12/21/20    1027    Hand   1    Peripheral IV 12/21/20 1045 Left Antecubital   12/21/20    1045    Antecubital   1    Open Drain Rectal   08/14/20    1250    Rectal   130    Ileostomy RUQ   03/01/20    0542    RUQ   296    Single Lumen Implantable Port Left Chest   --    --    Chest            Inactive LDAs     None                Medication Administration Report for Owen Carole MIRIAM as of 12/22/20 1230    Legend:    Given Hold Not Given Due Canceled Entry Other Actions    Time Time (Time) Time  Time-Action       Discontinued     Completed     Future     MAR Hold     Linked           Medications 12/20/20 12/21/20 12/22/20    acetaminophen (TYLENOL) tablet 650 mg  Dose: 650 mg  Freq: Every 4 Hours PRN Route: PO  PRN Reason: Mild Pain   Start: 12/21/20 1927    Admin Instructions:   Do not exceed 4 grams of acetaminophen in a 24 hr period.    If given for pain, use the following pain scale:    Mild Pain = Pain Score of 1-3, CPOT 1-2  Moderate Pain = Pain Score of 4-6, CPOT 3-4  Severe Pain = Pain Score of 7-10, CPOT 5-8  Do not exceed 4 grams of acetaminophen in a 24 hr period. Max dose of 2gm for AST/ALT greater than 120 units/L      If given for pain, use the following pain scale:   Mild Pain = Pain Score of 1-3, CPOT 1-2  Moderate Pain = Pain Score of 4-6, CPOT 3-4  Severe Pain = Pain Score of 7-10, CPOT 5-8      2346              cetirizine (zyrTEC) tablet 10 mg  Dose: 10 mg  Freq: Daily Route: PO  Start: 12/21/20 2030 2130          0812             clonazePAM (KlonoPIN) tablet 1 mg  Dose: 1 mg  Freq: 3 Times Daily PRN Route: PO  PRN Reasons: Anxiety,Seizures  Start: 12/21/20 1928   End: 12/28/20 1927    Admin Instructions:   Caution: Look alike/sound alike drug alert. Please read the label.   Caution: Look alike/sound alike drug alert.           Diclofenac Sodium (VOLTAREN) 1 % gel 4 g  Dose: 4 g  Freq: 3 Times Daily Route: TOP  Start: 12/21/20 2100    Admin Instructions:   Apply to affected area  DO not cover area with occlusive dressing or apply any other med to affected area. Do not wash affected area for 1 hr after applying. Use dosing card for correct dose measurement.      2130          0815     1600     2100           famotidine (PEPCID) tablet 20 mg  Dose: 20 mg  Freq: 2 Times Daily Route: PO  Start: 12/21/20 2100 2130          0812     2100            heparin (porcine) 5000 UNIT/ML injection 4,100-8,200 Units  Dose: 40-80 Units/kg  Weight Dosing Info: 102 kg  Freq: Every 6 Hours PRN Route: IV  PRN Comment: Per Heparin Nomogram  Indications of Use: DVT/PE (active thrombosis)  Start: 12/21/20 1502    Admin Instructions:   **Max Dose of 10,000 units** Bolus for VTE / PE:  PTT Less Than 60 sec, Administer 80 units/kg Bolus   PTT 60 - 70 sec, Administer 40 units/kg Bolus           heparin 55011 units/250 mL (100 units/mL) in 0.45 % NaCl infusion  Rate: 14.99 mL/hr Dose: 14.7  Units/kg/hr  Weight Dosing Info: 102 kg  Freq: Titrated Route: IV  Indications of Use: DVT/PE (active thrombosis)  Start: 12/21/20 1505    Admin Instructions:   Weight Based Heparin Nomogram: VTE / PE: Initial Heparin Infusion 18 units/kg/hr    aPTT Less Than 60: Bolus 80 units/kg & Increase Dose By 4 units/kg/hr.  aPTT 60-70: Bolus 40 units/kg & Increase Dose By 2 units/kg/hr.  aPTT 71-95: No Bolus, No Dose Change  aPTT : No Bolus, Decrease Dose By 2 units/kg/hr.  aPTT Greater Than 115: No Bolus. Hold Infusion For 1 hour.  Decrease Dose By 3 units/kg/hr. Repeat aPTT 6 Hours After Restarted.  If aPTT Remains Greater Than 115 Stop Heparin Drip & Contact Provider.  RN to release aPTT order 6 hours after heparin bolus 6 hours after any heparin rate change      1520     1954         0053     0741     0754           HYDROcodone-acetaminophen (NORCO) 7.5-325 MG per tablet 1 tablet  Dose: 1 tablet  Freq: Every 6 Hours PRN Route: PO  PRN Reason: Moderate Pain   Start: 12/21/20 1929    Admin Instructions:   [LALITA]    Do not exceed 4 grams of acetaminophen in a 24 hr period. Max dose of 2gm for AST/ALT greater than 120 units/L        If given for pain, use the following pain scale:   Mild Pain = Pain Score of 1-3, CPOT 1-2  Moderate Pain = Pain Score of 4-6, CPOT 3-4  Severe Pain = Pain Score of 7-10, CPOT 5-8      2130              HYDROmorphone (DILAUDID) injection 0.5 mg  Dose: 0.5 mg  Freq: Every 2 Hours PRN Route: IV  PRN Reason: Severe Pain   Start: 12/21/20 2326    Admin Instructions:   If given for pain, use the following pain scale:  Mild Pain = Pain Score of 1-3, CPOT 1-2  Moderate Pain = Pain Score of 4-6, CPOT 3-4  Severe Pain = Pain Score of 7-10, CPOT 5-8      2346              melatonin tablet 3 mg  Dose: 3 mg  Freq: Nightly PRN Route: PO  PRN Reason: Sleep  Start: 12/21/20 1927          mirtazapine (REMERON SOL-TAB) disintegrating tablet 15 mg  Dose: 15 mg  Freq: Nightly Route: PO  Start: 12/21/20 2100     Admin Instructions:   Allow patient to dissolve tablet on tongue.      2130 2100             nitroglycerin (NITROSTAT) SL tablet 0.4 mg  Dose: 0.4 mg  Freq: Every 5 Minutes PRN Route: SL  PRN Reason: Chest Pain  PRN Comment: Only if SBP Greater Than 100  Start: 12/21/20 1927    Admin Instructions:   If Pain Unrelieved After 3 Doses Notify MD           ondansetron (ZOFRAN) tablet 4 mg  Dose: 4 mg  Freq: Every 6 Hours PRN Route: PO  PRN Reasons: Nausea,Vomiting  Start: 12/21/20 1927         Or  ondansetron (ZOFRAN) injection 4 mg  Dose: 4 mg  Freq: Every 6 Hours PRN Route: IV  PRN Reasons: Nausea,Vomiting  Start: 12/21/20 1927          sodium chloride 0.9 % flush 10 mL  Dose: 10 mL  Freq: As Needed Route: IV  PRN Reason: Line Care  Start: 12/21/20 1043      0812             sodium chloride 0.9 % flush 10 mL  Dose: 10 mL  Freq: As Needed Route: IV  PRN Reason: Line Care  Start: 12/21/20 1017         Completed Medications  Medications 12/20/20 12/21/20 12/22/20       heparin (porcine) 5000 UNIT/ML injection 8,200 Units  Dose: 80 Units/kg  Weight Dosing Info: 102 kg  Freq: Once Route: IV  Indications of Use: DVT/PE (active thrombosis)  Start: 12/21/20 1505   End: 12/21/20 1518    Admin Instructions:   **Max Dose of 10,000 units** Bolus for VTE / PE      1518              HYDROmorphone (DILAUDID) injection 0.5 mg  Dose: 0.5 mg  Freq: Once Route: IV  Start: 12/21/20 1830   End: 12/21/20 1751    Admin Instructions:   If given for pain, use the following pain scale:  Mild Pain = Pain Score of 1-3, CPOT 1-2  Moderate Pain = Pain Score of 4-6, CPOT 3-4  Severe Pain = Pain Score of 7-10, CPOT 5-8      1751              HYDROmorphone (DILAUDID) injection 0.5 mg  Dose: 0.5 mg  Freq: Once Route: IV  Start: 12/21/20 1439   End: 12/21/20 1514    Admin Instructions:   If given for pain, use the following pain scale:  Mild Pain = Pain Score of 1-3, CPOT 1-2  Moderate Pain = Pain Score of 4-6, CPOT 3-4  Severe Pain = Pain  Score of 7-10, CPOT 5-8      1514              iopamidol (ISOVUE-300) 61 % injection 100 mL  Dose: 100 mL  Freq: Once in Imaging Route: IV  Start: 12/21/20 1246   End: 12/21/20 1247     1247              ondansetron (ZOFRAN) injection 4 mg  Dose: 4 mg  Freq: Once Route: IV  Start: 12/21/20 1439   End: 12/21/20 1518     1518             Discontinued Medications  Medications 12/20/20 12/21/20 12/22/20       heparin (porcine) 5000 UNIT/ML injection 4,100-8,200 Units  Dose: 40-80 Units/kg  Weight Dosing Info: 102 kg  Freq: Every 6 Hours PRN Route: IV  PRN Comment: Per Heparin Nomogram  Indications Comment: svc and brachiocephalic thrombus  Start: 12/21/20 1403   End: 12/21/20 1503    Admin Instructions:   **Max Dose of 10,000 units** Bolus for VTE / PE:  PTT Less Than 60 sec, Administer 80 units/kg Bolus   PTT 60 - 70 sec, Administer 40 units/kg Bolus           heparin (porcine) 5000 UNIT/ML injection 8,200 Units  Dose: 80 Units/kg  Weight Dosing Info: 102 kg  Freq: Once Route: IV  Indications Comment: svc and brachiocephalic thrombus  Start: 12/21/20 1407   End: 12/21/20 1503    Admin Instructions:   **Max Dose of 10,000 units** Bolus for VTE / PE                    Pharmacy to Dose enoxaparin (LOVENOX)  Freq: Continuous PRN Route: XX  PRN Reason: Consult  Indications Comment: Prophylaxis of Venous Thromboembolism  Start: 12/21/20 1927   End: 12/21/20 1936    Order specific questions:   Indication of use Prophylaxis                     Lab Results (all)     Procedure Component Value Units Date/Time    aPTT [[id number]] Collected: 12/22/20 1145    Specimen: Blood from Hand, Left Updated: 12/22/20 1224    Basic Metabolic Panel [[id number]]  (Abnormal) Collected: 12/22/20 0456    Specimen: Blood Updated: 12/22/20 0650     Glucose 115 mg/dL      BUN 10 mg/dL      Creatinine 0.77 mg/dL      Sodium 134 mmol/L      Potassium 3.8 mmol/L      Comment: Slight hemolysis detected by analyzer. Results may be affected.        Chloride  100 mmol/L      CO2 22.5 mmol/L      Calcium 8.6 mg/dL      eGFR Non African Amer 83 mL/min/1.73      BUN/Creatinine Ratio 13.0     Anion Gap 11.5 mmol/L     Narrative:      aPTT [[id number]]  (Abnormal) Collected: 12/22/20 0456    Specimen: Blood from Arm, Right Updated: 12/22/20 0630     PTT 86.6 seconds     CBC & Differential [[id number]]  (Abnormal) Collected: 12/22/20 0456    Specimen: Blood Updated: 12/22/20 0605    Narrative:      CBC Auto Differential [[id number]]  (Abnormal) Collected: 12/22/20 0456    Specimen: Blood Updated: 12/22/20 0605     WBC 7.12 10*3/mm3      RBC 3.64 10*6/mm3      Hemoglobin 12.0 g/dL      Hematocrit 34.5 %      MCV 94.8 fL      MCH 33.0 pg      MCHC 34.8 g/dL      RDW 17.6 %      RDW-SD 60.2 fl      MPV 10.4 fL      Platelets 254 10*3/mm3      Neutrophil % 66.9 %      Lymphocyte % 12.9 %      Monocyte % 15.9 %      Eosinophil % 3.5 %      Basophil % 0.4 %      Immature Grans % 0.4 %      Neutrophils, Absolute 4.76 10*3/mm3      Lymphocytes, Absolute 0.92 10*3/mm3      Monocytes, Absolute 1.13 10*3/mm3      Eosinophils, Absolute 0.25 10*3/mm3      Basophils, Absolute 0.03 10*3/mm3      Immature Grans, Absolute 0.03 10*3/mm3      nRBC 0.0 /100 WBC     aPTT [[id number]]  (Abnormal) Collected: 12/21/20 2316    Specimen: Blood from Arm, Right Updated: 12/21/20 2354     PTT 69.8 seconds     COVID PRE-OP / PRE-PROCEDURE SCREENING ORDER (NO ISOLATION) - Swab, Nasopharynx [[id number]]  (Normal) Collected: 12/21/20 1527    Specimen: Swab from Nasopharynx Updated: 12/21/20 1645    Narrative:      COVID-19, [facility] IN-HOUSE CEPHEID, NP SWAB IN TRANSPORT MEDIA 8-12 HR TAT - Swab, Nasopharynx [[id number]]  (Normal) Collected: 12/21/20 1527    Specimen: Swab from Nasopharynx Updated: 12/21/20 1645     COVID19 Not Detected    Narrative:      Fact sheet for providers: https://www.fda.gov/media/351601/download     Fact sheet for patients: https://www.fda.gov/media/782098/download    aPTT [[id number]]   (Normal) Collected: 12/21/20 1038    Specimen: Blood Updated: 12/21/20 1428     PTT 26.5 seconds     Protime-INR [[id number]]  (Abnormal) Collected: 12/21/20 1038    Specimen: Blood Updated: 12/21/20 1145     Protime 16.9 Seconds      INR 1.40    CBC & Differential [[id number]]  (Abnormal) Collected: 12/21/20 1123    Specimen: Blood Updated: 12/21/20 1137    Narrative:      CBC Auto Differential [[id number]]  (Abnormal) Collected: 12/21/20 1123    Specimen: Blood Updated: 12/21/20 1137     WBC 10.29 10*3/mm3      RBC 4.05 10*6/mm3      Hemoglobin 12.8 g/dL      Hematocrit 37.8 %      MCV 93.3 fL      MCH 31.6 pg      MCHC 33.9 g/dL      RDW 17.7 %      RDW-SD 58.4 fl      MPV 9.0 fL      Platelets 195 10*3/mm3      Neutrophil % 80.4 %      Lymphocyte % 7.2 %      Monocyte % 10.9 %      Eosinophil % 0.6 %      Basophil % 0.2 %      Immature Grans % 0.7 %      Neutrophils, Absolute 8.28 10*3/mm3      Lymphocytes, Absolute 0.74 10*3/mm3      Monocytes, Absolute 1.12 10*3/mm3      Eosinophils, Absolute 0.06 10*3/mm3      Basophils, Absolute 0.02 10*3/mm3      Immature Grans, Absolute 0.07 10*3/mm3      nRBC 0.0 /100 WBC     Green Top (Gel) [[id number]] Collected: 12/21/20 1029    Specimen: Blood Updated: 12/21/20 1130     Extra Tube Hold for add-ons.     Comment: Auto resulted.       Lactic Acid, Plasma [[id number]]  (Normal) Collected: 12/21/20 1052    Specimen: Blood Updated: 12/21/20 1124     Lactate 1.5 mmol/L     Comprehensive Metabolic Panel [[id number]]  (Abnormal) Collected: 12/21/20 1029    Specimen: Blood Updated: 12/21/20 1116     Glucose 129 mg/dL      BUN 9 mg/dL      Creatinine 0.85 mg/dL      Sodium 134 mmol/L      Potassium 4.2 mmol/L      Comment: Specimen hemolyzed.  Results may be affected.        Chloride 99 mmol/L      CO2 21.0 mmol/L      Calcium 9.2 mg/dL      Total Protein 7.3 g/dL      Albumin 3.50 g/dL      ALT (SGPT) 29 U/L      Comment: Specimen hemolyzed.  Results may be affected.        AST  (SGOT) 28 U/L      Comment: Specimen hemolyzed.  Results may be affected.        Alkaline Phosphatase 115 U/L      Total Bilirubin 0.8 mg/dL      eGFR Non African Amer 74 mL/min/1.73      Globulin 3.8 gm/dL      A/G Ratio 0.9 g/dL      BUN/Creatinine Ratio 10.6     Anion Gap 14.0 mmol/L     Narrative:      Troponin [[id number]]  (Normal) Collected: 12/21/20 1029    Specimen: Blood Updated: 12/21/20 1115     Troponin T <0.010 ng/mL     Narrative:      BNP [[id number]]  (Normal) Collected: 12/21/20 1029    Specimen: Blood Updated: 12/21/20 1113     proBNP 103.0 pg/mL     Narrative:            Imaging Results (Most Recent)     Procedure Component Value Units Date/Time    CT Chest With Contrast [[id number]] Collected: 12/21/20 1450     Updated: 12/21/20 1600    Narrative:      CT CHEST WITH IV CONTRAST     HISTORY: 40-year-old female with metastatic rectal cancer. Left MediPort  catheter removed and a right MediPort catheter placed. Patient states  swelling of the neck and upper extremities.     TECHNIQUE: Radiation dose reduction techniques were utilized, including  automated exposure control and exposure modulation based on body size.   3 mm images were obtained through the chest after the administration of  IV contrast. A venous phase series was obtained as well. Compared with  previous chest CTA from 11/16/2020. Left upper extremity peripheral IV  contrast injection.     FINDINGS: There are many left chest wall collaterals which drain into  the hemiazygos vein, intercostal, and left internal mammary veins. The  brachiocephalic vein and the SVC appear thrombosed. There is some  contrast opacification of the right subclavian vein on the 2nd series,  but the medial aspect of the right subclavian vein and the junction with  the SVC are poorly seen. There is a new very small right pleural  effusion and there is new atelectatic change at the right lower lobe and  right middle lobe. There are also new atelectatic changes at  the left  lower lobe as well as a few new tiny patches of ground glass density at  both upper lobes. There is a new tiny pericardial effusion. There is  continued long-term stability of the irregular 7 mm right upper lobe  pulmonary nodule. There is new ill-defined soft tissue density at the  right hilum and there are more shotty mediastinal nodes than previously.  No new abnormality is seen within the visualized upper abdomen.       Impression:      1. 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  Thrombus within the right internal jugular vein cannot be excluded.  2. New very small right pleural effusion and new tiny pericardial  effusion. There is compressive atelectatic change at the right lower  lobe. The new ill-defined soft tissue at the right hilum may possibly be  related to the venous congestion, but lymphadenopathy cannot be  excluded.  3. The very small patchy upper lobe ground glass opacities have a  nonspecific appearance. The appearance can be seen with COVID-19  pneumonia.     Discussed with JULIET Mcdonald.     This report was finalized on 12/21/2020 3:57 PM by Dr. Caroline Solano M.D.           ECG/EMG Results (all)     Procedure Component Value Units Date/Time    ECG 12 Lead [[id number]] Collected: 12/21/20 1104     Updated: 12/21/20 1556     QT Interval 294 ms     Narrative:      HEART RATE= 127  bpm  RR Interval= 472  ms  WA Interval= 142  ms  P Horizontal Axis= 28  deg  P Front Axis= 50  deg  QRSD Interval= 84  ms  QT Interval= 294  ms  QRS Axis= 60  deg  T Wave Axis= -14  deg  - BORDERLINE ECG -  Sinus tachycardia  Borderline T wave abnormalities  NO SIGNIFICANT CHANGE FROM PREVIOUS ECG  Electronically Signed By: Abhijeet Kong ([facility]) 21-Dec-2020 15:47:33  Date and Time of Study: 2020-12-21 11:04:29          Orders (all)      Start     Ordered    12/22/20 1600  COVID-19,[facility] IN-HOUSE, NP/OP SWAB IN UTM/VTM/SALINE TRANSPORT MEDIA,24 HR TAT - Swab,  Nasopharynx  Once      12/22/20 1015    12/22/20 1105  aPTT  STAT      12/22/20 1104    12/22/20 0749  OK to use R IJ port  Nursing Communication  Continuous     Comments: OK to use R IJ port    12/22/20 0748    12/22/20 0000  PT Consult: Eval & Treat as tolerated  Once      12/21/20 1928 12/21/20 2326  HYDROmorphone (DILAUDID) injection 0.5 mg  Every 2 Hours PRN      12/21/20 2327 12/21/20 2000  Vital Signs  Every 4 Hours     Comments: Per per hospital policy    12/21/20 1928 12/21/20 1929  HYDROcodone-acetaminophen (NORCO) 7.5-325 MG per tablet 1 tablet  Every 6 Hours PRN      12/21/20 1930 12/21/20 1929  Diet Regular; Consistent Carbohydrate  Diet Effective Now      12/21/20 1928 12/21/20 1928  Code Status and Medical Interventions:  Continuous      12/21/20 1928 12/21/20 1928  Cardiac Monitoring  Continuous,   Status:  Canceled      12/21/20 1928 12/21/20 1928  Telemetry - Maintain IV Access  Continuous      12/21/20 1928 12/21/20 1928  Continuous Cardiac Monitoring  Continuous      12/21/20 1928 12/21/20 1928  May Be Off Telemetry for Tests  Continuous      12/21/20 1928 12/21/20 1928  ACLS Protocol For Life Threatening Dysrhythmias (Unless Code Status Indicates Otherwise)  Continuous      12/21/20 1928 12/21/20 1928  Notify Provider if ACLS Protocol Activated  Until Discontinued      12/21/20 1928 12/21/20 1928  Strict Intake & Output  Every Shift      12/21/20 1928 12/21/20 1928  Inpatient Hematology & Oncology Consult  Once     Specialty:  Hematology and Oncology  Provider:  Myriam Scales MD    12/21/20 1928 12/21/20 1928  Inpatient Vascular Surgery Consult  Once     Specialty:  Vascular Surgery  Provider:  Lyric Angelo Jr., MD    12/21/20 1928 12/21/20 1927  melatonin tablet 3 mg  Nightly PRN      12/21/20 1928 12/21/20 1927  Pharmacy to Dose enoxaparin (LOVENOX)  Continuous PRN,   Status:  Discontinued      12/21/20 1928 12/21/20 1927   nitroglycerin (NITROSTAT) SL tablet 0.4 mg  Every 5 Minutes PRN      12/21/20 1928    12/21/20 1927  acetaminophen (TYLENOL) tablet 650 mg  Every 4 Hours PRN      12/21/20 1928    12/21/20 1927  ondansetron (ZOFRAN) tablet 4 mg  Every 6 Hours PRN      12/21/20 1928    12/21/20 1927  ondansetron (ZOFRAN) injection 4 mg  Every 6 Hours PRN      12/21/20 1928    12/21/20 1830  HYDROmorphone (DILAUDID) injection 0.5 mg  Once      12/21/20 1735    12/21/20 1741  Wound Ostomy Eval & Treat  Once      12/21/20 1740    12/21/20 1602  Inpatient Admission  Once      12/21/20 1601    12/21/20 1544  [facility] (on-call MD unless specified) Details  Once     Specialty:  Hospitalist  Provider:  (Not yet assigned)    12/21/20 1543    12/21/20 1439  ondansetron (ZOFRAN) injection 4 mg  Once      12/21/20 1437    12/21/20 1404  Inpatient Vascular Surgery Consult  Once     Specialty:  Vascular Surgery  Provider:  (Not yet assigned)    12/21/20 1405    12/21/20 1404  Adjust Heparin Rate Based on aPTT Using Nomogram  Continuous,   Status:  Canceled      12/21/20 1405    12/21/20 1404  Verify All Anticoagulant Orders Are Discontinued Upon Initiation of Heparin Protocol (eg Enoxaparin, Fondaparinux, Apixaban, Dabigatran, Edoxaban, or Rivaroxaban)  Once,   Status:  Canceled      12/21/20 1405    12/21/20 1044  Monitor Blood Pressure  Per Hospital Policy      12/21/20 1043    12/21/20 1044  Cardiac Monitoring  Once,   Status:  Canceled      12/21/20 1043    12/21/20 1044  Pulse Oximetry, Continuous  Continuous      12/21/20 1043    12/21/20 1043  sodium chloride 0.9 % flush 10 mL  As Needed      12/21/20 1043    Unscheduled  Telemetry - Pulse Oximetry  Continuous PRN     Comments: If Patient Develops Unresponsiveness, Acute Dyspnea, Cyanosis or Suspected Hypoxemia Start Continuous Pulse Ox Monitoring, Apply Oxygen & Notify Provider    12/21/20 1928    Unscheduled  Oxygen Therapy- Nasal Cannula; Titrate for SPO2: 90% - 95%  Continuous PRN      Comments: If Patient Develops Unresponsiveness, Acute Dyspnea, Cyanosis or Suspected Hypoxemia Start Continuous Pulse Ox Monitoring, Apply Oxygen & Notify Provider    12/21/20 1928                     Physician Progress Notes (all)      Lyric Angelo Jr., MD at 12/22/20 5295        Patient notices only modest improvement in her facial and chest swelling since she has been on a heparin drip.    Markedly dilated superficial veins on the across entire chest and shoulder region.  Right arm swelling, 1+ including the hand.    I discussed the possibility of port removal with thrombolysis and possible SVC stent placement but given the fact that she is hypercoagulable from her cancer as well as factor V Leiden and needing to replace the port in this tract there is high probability of recurrent issues.    At this point I recommended that we continue heparin drip to see if there is further decompression and collateral formation.  She is in agreement with the plan.    IT IS OK TO CONTINUE TO USE HER CURRENT R IJ PORT.    Electronically signed by Lyric Angelo Jr., MD at 12/22/20 1050          Consult Notes (all)      Lyric Angelo Jr., MD at 12/21/20 5164      Consult Orders    1. Inpatient Vascular Surgery Consult [[id number]] ordered by Rosalie Sanon APRN at 12/21/20 1403             The patient is a 40-year-old female with a history of stage IV metastatic colon cancer who has been undergoing chemotherapy.  She has a history of factor V Leiden hypercoagulability but also pulmonary embolism.  She previously had placement of a left subclavian port and has been having some issues with pain with infusions more recently.  She had removal of the left subclavian port and placement of a new right internal jugular port 3 days ago, December 18, 2020..    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  CT scan  of the chest with contrast today demonstrates the same finding, along with multiple dilated collaterals including the hemiazygos system, intercostal veins and internal mammary veins.  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  There appears to be new thrombus in the right subclavian vein and its junction with the superior vena cava.    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  The port is obviously needed for her chemotherapy.  She also needs chronic anticoagulation because of her factor V Leiden hypercoagulable state with previous pulmonary embolism,  but also hypercoagulability related to her malignancy.  I will defer management of that to the hematology oncology service.  The only potential option from the vascular surgery service would be removal of the right internal jugular port with thrombolysis/thrombectomy  And placing the port back once again.  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  There is high probability that rethrombosis would occur in the superior vena cava and subclavian vein because of the port coursing through these veins.  Therefore, anticoagulation only at this time, but consider the above if her swelling does not improve.        Electronically signed by Lyric Angelo Jr., MD at 12/21/20 5507

## 2020-12-22 NOTE — PROGRESS NOTES
Patient notices only modest improvement in her facial and chest swelling since she has been on a heparin drip.    Markedly dilated superficial veins on the across entire chest and shoulder region.  Right arm swelling, 1+ including the hand.    I discussed the possibility of port removal with thrombolysis and possible SVC stent placement but given the fact that she is hypercoagulable from her cancer as well as factor V Leiden and needing to replace the port in this tract there is high probability of recurrent issues.    At this point I recommended that we continue heparin drip to see if there is further decompression and collateral formation.  She is in agreement with the plan.    IT IS OK TO CONTINUE TO USE HER CURRENT R IJ PORT.

## 2020-12-22 NOTE — H&P
HISTORY AND PHYSICAL   [facility]        Patient Identification:  Name: Carole Weaver  Age: 40 y.o.  Sex: female  :  1980  MRN: [id number]                     Primary Care Physician: Deepika Vela III, NP-C    Chief Complaint:  40 year old female who presented to the emergency room with swelling of her neck and pain of her right arm; she has also been short of air; she has a history of rectal cancer and is undergoing chemotherapy; her port was recently removed from the left side and moved to the right; she has been on xarelto because of a history of pe and factor v leiden mutation; she denies fever or chills; workup revealed dvts and she was started on heparin and admitted    History of Present Illness:   As above    Past Medical History:  Past Medical History:   Diagnosis Date   • Abdominopelvic abscess (CMS/HCC) 2020    ADMITTED TO [facility]   • Abnormal Pap smear of cervix 1998    JULIUS I   • Anemia in pregnancy    • Anxiety    • Bilateral epiphora 2020   • Bilateral hand swelling 2020    SEEN AT [facility] ER   • Cervical lymphadenitis 2012    SEEN AT [facility] ER   • Chemotherapy induced neutropenia (CMS/HCC) 2020   • Chemotherapy-induced nausea 2020   • Chemotherapy-induced thrombocytopenia 2020   • Chronic diarrhea    • Colon polyps     FOLLOWED BY DR. GERONIMO ESPARZA   • Cystitis 2020    WITH DEHYDRATION, ADMITTED TO [facility]   • Cystitis 10/27/2020   • Drug-induced peripheral neuropathy (CMS/HCC) 2020   • Fistula of intestine    • GERD (gastroesophageal reflux disease)    • Hand foot syndrome 2020   • Heart murmur     IN CHILDHOOD   • Hematochezia    • Hemorrhoids    • Heterozygous factor V Leiden mutation (CMS/HCC)     DX 2019   • History of anemia    • History of chemotherapy     FOLLOWED BY DR. ALEXANDRU HUNT   • History of gestational diabetes    • History of pre-eclampsia    • History of radiation therapy     FOLLOWED BY DR. ALEJANDRO  DEBBIE   • History of TB skin testing 2009    TB Skin Test   • HPV in female    • Hypokalemia 2019   • Hypomagnesemia 2019   • IBS (irritable bowel syndrome)    • Ileostomy in place (CMS/[facility])     FOLLOWED BY DR. PADMAJA ESPARZA   • Infected insect bite of neck 2016    SEEN AT [facility]   • Kidney stones 2007    SEEN AT [facility] ER   • Lump of right breast     SWOLLEN LYMPH NODE   • Lung cancer (CMS/HCC) 2020    METASTATIC LUNG CANCER   • Monopolar depression (CMS/HCC)    • On anticoagulant therapy    • Perirectal abscess 2020   • PIH (pregnancy induced hypertension)    • Pulmonary embolism without acute cor pulmonale (CMS/HCC) 09/20/2019    X 3; ADMITTED TO [facility]   • Pulmonary nodule, right 2020   • Rectal cancer (CMS/HCC) 2019    STAGE IIA, INVASIVE MODERATELY DIFFERENTIATED ADENOCARCINOMA, CHEMO AND XRT FINISHED 2019   • Right shoulder pain 2020    SEEN AT [facility] ER   • Right ureteral stone 10/01/2019    SEEN AT [facility] ER   • SAB (spontaneous ) 1996     A2-1 INDUCED   • Sciatica    • Sepsis due to cellulitis (CMS/HCC) 2002    LEFT GREAT TOE, ADMITTED TO [facility]   • Tachycardia 2020   • Urinary urgency 2020     Past Surgical History:  Past Surgical History:   Procedure Laterality Date   • CHOLECYSTECTOMY N/A 10/10/2003    LAPAROSCOPIC WITH CHOLANGIOGRAM, DR. JAMEY TALAVERA AT [facility]   • COLON RESECTION N/A 2019    Procedure: laparoscopic low anterior colon resection with mobilization of splenic flexure and diverting loop ileostomy: ERAS;  Surgeon: Padmaja Esparza MD;  Location: [facility] OR;  Service: General   • COLON RESECTION N/A 8/3/2020    Procedure: LOW ANTERIOR COLON RESECTION, COLOANAL ANASTOMOSIS, MOBILIZATION SPLENIC FLEXURE;  Surgeon: Padmaja Esparza MD;  Location: [facility] OR;  Service: General;  Laterality: N/A;   • COLONOSCOPY N/A 7/15/2020    PATENT ANASTAMOSIS IN MID RECTUM, RESCOPE IN 1 YR, DR. PADMAJA ESPARZA AT [facility]    • COLONOSCOPY W/ POLYPECTOMY N/A 07/08/2019    15 MM TUBULOVILLOUS ADENOMA POLYP IN HEPATIC FLEXURE, 20 MMTUBULOVILLOUS ADENOMA WITH HIGH GRADE DYSPLASIA IN RECTOSIGMOID, 4 CM MASS IN MID RECTUM, PATH: INVASIVE MODERATELY DIFFERENTIATED ADENOCARCINOMA, DR. JENNIFER LI AT [facility]   • DILATATION AND EVACUATION N/A 2009   • ENDOSCOPY N/A 07/08/2019    LA GRADE A ESOPHAGITIS, GASTRITIS, ALL BIOPSIES BENIGN, DR. JENNIFER LI AT [facility]   • INCISION AND DRAINAGE PERIRECTAL ABSCESS N/A 8/14/2020    Procedure: INCISION AND DRAINAGE OF retrorectal dehiscence abcess with drain placement and irrigation;  Surgeon: Geronimo Ye MD;  Location: [facility] MAIN OR;  Service: General;  Laterality: N/A;   • PAP SMEAR N/A 01/24/2014   • SIGMOIDOSCOPY N/A 7/24/2019    INFILTRATIVE PARTIALLY OBSTRUCTING LARGE RECTAL CANCER, AREA TATOOED, DR. GERONIMO YE AT [facility]   • SIGMOIDOSCOPY N/A 11/23/2019    AN ULCERATED PARTIALLY OBSTRUCTING MASS IN MID RECTUM, AREA TATTOOED, DR. GERONIMO YE AT [facility]   • TRANSRECTAL ULTRASOUND N/A 7/24/2019    Procedure: ULTRASOUND TRANSRECTAL;  Surgeon: Geronimo Ye MD;  Location: [facility] ENDOSCOPY;  Service: General   • URETEROSCOPY LASER LITHOTRIPSY WITH STENT INSERTION Right 10/30/2019    DR. SETUARDO BEASLEY AT [facility]   • VAGINAL DELIVERY N/A 09/18/1998   • VENOUS ACCESS DEVICE (PORT) INSERTION Left 8/9/2019    Procedure: INSERTION VENOUS ACCESS DEVICE;  Surgeon: Sj Joseph MD;  Location: [facility] OR [facility];  Service: General   • VENOUS ACCESS DEVICE (PORT) INSERTION N/A 12/18/2020    Procedure: INSERTION VENOUS ACCESS DEVICE right side, removal venous access device left side;  Surgeon: Sj Joseph MD;  Location: [facility] OR [facility];  Service: General;  Laterality: N/A;   • WISDOM TOOTH EXTRACTION Bilateral 1993      Home Meds:  Medications Prior to Admission   Medication Sig Dispense Refill Last Dose   • clonazePAM (KlonoPIN) 1 MG tablet Take 1 tablet by mouth 3 (Three)  Times a Day As Needed for Anxiety or Seizures. 90 tablet 0    • Diclofenac Sodium (VOLTAREN) 1 % gel gel Apply 4 g topically to the appropriate area as directed 3 (Three) Times a Day. 150 g 3    • dicyclomine (BENTYL) 20 MG tablet TAKE 1 TABLET BY MOUTH EVERY 6 HOURS AS NEEDED 360 tablet 0    • diphenoxylate-atropine (LOMOTIL) 2.5-0.025 MG per tablet Take 1 tablet by mouth 4 (Four) Times a Day As Needed for Diarrhea. 120 tablet 1    • famotidine (PEPCID) 20 MG tablet TAKE 1 TABLET BY MOUTH TWICE A DAY (Patient taking differently: Take 20 mg by mouth 2 (Two) Times a Day.) 180 tablet 1    • HYDROcodone-acetaminophen (Norco) 5-325 MG per tablet 1-2 by mouth every four hours as needed for pain 24 tablet 0    • HYDROcodone-acetaminophen (NORCO) 7.5-325 MG per tablet Take 1 tablet by mouth Every 6 (Six) Hours As Needed for Moderate Pain . 240 tablet 0    • Loratadine (CLARITIN) 10 MG capsule Take 10 mg by mouth Every Evening.      • mirtazapine (REMERON SOL-TAB) 15 MG disintegrating tablet Place 1 tablet on the tongue Every Night. 90 tablet 0    • ondansetron (Zofran) 4 MG tablet Take 1 tablet by mouth Every 6 (Six) Hours As Needed for Nausea or Vomiting for up to 10 doses. 10 tablet 1    • ondansetron (ZOFRAN) 8 MG tablet Take 1 tablet by mouth 3 (Three) Times a Day As Needed for Nausea or Vomiting. 60 tablet 5    • prochlorperazine (COMPAZINE) 10 MG tablet Take 1 tablet by mouth Every 6 (Six) Hours As Needed for Nausea or Vomiting. 30 tablet 2    • rivaroxaban (Xarelto) 20 MG tablet Take 1 tablet by mouth Daily. 90 tablet 3        Allergies:  No Known Allergies  Immunizations:  Immunization History   Administered Date(s) Administered   • flucelvax quad pfs =>4 YRS 11/08/2018     Social History:   Social History     Social History Narrative   • Not on file     Social History     Socioeconomic History   • Marital status:      Spouse name: Justus   • Number of children: 1   • Years of education: College   • Highest  education level: Not on file   Occupational History   • Occupation:      Comment: [employer]     Employer: [employer]   Tobacco Use   • Smoking status: Heavy Tobacco Smoker     Packs/day: 1.00     Years: 24.00     Pack years: 24.00     Types: Electronic Cigarette, Cigarettes   • Smokeless tobacco: Never Used   • Tobacco comment: LAST CIGARETTE 2020   Substance and Sexual Activity   • Alcohol use: Not Currently   • Drug use: No   • Sexual activity: Defer       Family History:  Family History   Problem Relation Age of Onset   • Hyperlipidemia Mother    • Colon polyps Mother    • Heart disease Mother    • Hypertension Mother    • Factor V Leiden deficiency Mother    • Skin cancer Father         Squamous in 60s   • Heart disease Paternal Grandfather    • No Known Problems Son    • Cancer Paternal Uncle    • Colon cancer Paternal Uncle    • Diabetes Paternal Uncle    • Mental illness Sister         Addiction   • Colon cancer Maternal Uncle    • Malig Hyperthermia Neg Hx         Review of Systems  See history of present illness and past medical history.  Patient denies headache, dizziness, syncope, falls, trauma, change in vision, change in hearing, change in taste, changes in weight, changes in appetite, focal weakness, numbness, or paresthesia.  Patient denies chest pain, palpitations, dyspnea, orthopnea, PND, cough, sinus pressure, rhinorrhea, epistaxis, hemoptysis, nausea, vomiting,hematemesis, diarrhea, constipation or hematchezia.  Denies cold or heat intolerance, polydipsia, polyuria, polyphagia. Denies hematuria, pyuria, dysuria, hesitancy, frequency or urgency. Denies consumption of raw and under cooked meats foods or change in water source.  Denies fever, chills, sweats, night sweats.  Denies missing any routine medications. Remainder of ROS is negative.    Objective:  T Max 24 hrs: Temp (24hrs), Av.3 °F (37.4 °C), Min:98.6 °F (37 °C), Max:99.9 °F (37.7 °C)    Vitals Ranges:   Temp:   "[98.6 °F (37 °C)-99.9 °F (37.7 °C)] 99.9 °F (37.7 °C)  Heart Rate:  [122-147] 122  Resp:  [14-16] 14  BP: (121-146)/(62-99) 138/82      Exam:  /82 (BP Location: Left leg, Patient Position: Lying)   Pulse (!) 122   Temp 99.9 °F (37.7 °C) (Oral)   Resp 14   Ht 166 cm (65.35\")   Wt 101 kg (223 lb)   SpO2 94%   BMI 36.71 kg/m²     General Appearance:    Alert, cooperative, no distress, appears stated age   Head:    Normocephalic, without obvious abnormality, atraumatic   Eyes:    PERRL, conjunctivae/corneas clear, EOM's intact, both eyes   Ears:    Normal external ear canals, both ears   Nose:   Nares normal, septum midline, mucosa normal, no drainage    or sinus tenderness   Throat:   Lips, mucosa, and tongue normal   Neck:   Supple, symmetrical, trachea midline, no adenopathy;     thyroid:  no enlargement/tenderness/nodules; no carotid    bruit or JVD; swelling right supraclavicular area   Back:     Symmetric, no curvature, ROM normal, no CVA tenderness   Lungs:     Decreased breath sounds bilaterally, respirations unlabored   Chest Wall:    No tenderness or deformity    Heart:    Regular rate and rhythm, S1 and S2 normal, no murmur, rub   or gallop   Abdomen:     Soft, nontender, bowel sounds active all four quadrants,     no masses, no hepatomegaly, no splenomegaly   Extremities:   Extremities normal, atraumatic, no cyanosis or edema   Pulses:   2+ and symmetric all extremities   Skin:   Skin color, texture, turgor normal, no rashes or lesions   Lymph nodes:   Cervical, supraclavicular, and axillary nodes normal   Neurologic:   CNII-XII intact, normal strength, sensation intact throughout      .    Data Review:  Labs in chart were reviewed.  WBC   Date Value Ref Range Status   12/21/2020 10.29 3.40 - 10.80 10*3/mm3 Final     Hemoglobin   Date Value Ref Range Status   12/21/2020 12.8 12.0 - 15.9 g/dL Final     Hematocrit   Date Value Ref Range Status   12/21/2020 37.8 34.0 - 46.6 % Final     Platelets "   Date Value Ref Range Status   12/21/2020 195 140 - 450 10*3/mm3 Final     Sodium   Date Value Ref Range Status   12/21/2020 134 (L) 136 - 145 mmol/L Final     Potassium   Date Value Ref Range Status   12/21/2020 4.2 3.5 - 5.2 mmol/L Final     Comment:     Specimen hemolyzed.  Results may be affected.     Chloride   Date Value Ref Range Status   12/21/2020 99 98 - 107 mmol/L Final     CO2   Date Value Ref Range Status   12/21/2020 21.0 (L) 22.0 - 29.0 mmol/L Final     BUN   Date Value Ref Range Status   12/21/2020 9 6 - 20 mg/dL Final     Creatinine   Date Value Ref Range Status   12/21/2020 0.85 0.57 - 1.00 mg/dL Final     Glucose   Date Value Ref Range Status   12/21/2020 129 (H) 65 - 99 mg/dL Final     Calcium   Date Value Ref Range Status   12/21/2020 9.2 8.6 - 10.5 mg/dL Final     AST (SGOT)   Date Value Ref Range Status   12/21/2020 28 1 - 32 U/L Final     Comment:     Specimen hemolyzed.  Results may be affected.     ALT (SGPT)   Date Value Ref Range Status   12/21/2020 29 1 - 33 U/L Final     Comment:     Specimen hemolyzed.  Results may be affected.     Alkaline Phosphatase   Date Value Ref Range Status   12/21/2020 115 39 - 117 U/L Final     INR   Date Value Ref Range Status   12/21/2020 1.40 (H) 0.90 - 1.10 Final                Imaging Results (All)     Procedure Component Value Units Date/Time    CT Chest With Contrast [[id number]] Collected: 12/21/20 1450     Updated: 12/21/20 1600    Narrative:      CT CHEST WITH IV CONTRAST     HISTORY: 40-year-old female with metastatic rectal cancer. Left MediPort  catheter removed and a right MediPort catheter placed. Patient states  swelling of the neck and upper extremities.     TECHNIQUE: Radiation dose reduction techniques were utilized, including  automated exposure control and exposure modulation based on body size.   3 mm images were obtained through the chest after the administration of  IV contrast. A venous phase series was obtained as well. Compared  with  previous chest CTA from 11/16/2020. Left upper extremity peripheral IV  contrast injection.     FINDINGS: There are many left chest wall collaterals which drain into  the hemiazygos vein, intercostal, and left internal mammary veins. The  brachiocephalic vein and the SVC appear thrombosed. There is some  contrast opacification of the right subclavian vein on the 2nd series,  but the medial aspect of the right subclavian vein and the junction with  the SVC are poorly seen. There is a new very small right pleural  effusion and there is new atelectatic change at the right lower lobe and  right middle lobe. There are also new atelectatic changes at the left  lower lobe as well as a few new tiny patches of ground glass density at  both upper lobes. There is a new tiny pericardial effusion. There is  continued long-term stability of the irregular 7 mm right upper lobe  pulmonary nodule. There is new ill-defined soft tissue density at the  right hilum and there are more shotty mediastinal nodes than previously.  No new abnormality is seen within the visualized upper abdomen.       Impression:      1. 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  Thrombus within the right internal jugular vein cannot be excluded.  2. New very small right pleural effusion and new tiny pericardial  effusion. There is compressive atelectatic change at the right lower  lobe. The new ill-defined soft tissue at the right hilum may possibly be  related to the venous congestion, but lymphadenopathy cannot be  excluded.  3. The very small patchy upper lobe ground glass opacities have a  nonspecific appearance. The appearance can be seen with COVID-19  pneumonia.     Discussed with JULIET Mcdonald.     This report was finalized on 12/21/2020 3:57 PM by Dr. Caroline Solano M.D.           Patient Active Problem List   Diagnosis Code   • Anxiety F41.9   • Irritable bowel syndrome K58.9   • Immunization due Z23   • Monopolar  depression (CMS/HCC) F33.9   • Adenocarcinoma of rectum (CMS/HCC) C20   • Family history of factor V Leiden mutation Z83.2   • Heterozygous factor V Leiden mutation (CMS/HCC) D68.51   • Encounter for fitting and adjustment of vascular catheter Z45.2   • Functional diarrhea K59.1   • Adverse effect of antineoplastic and immunosuppressive drugs, initial encounter T45.1X5A   • Hypokalemia E87.6   • Hypomagnesemia E83.42   • Other pulmonary embolism without acute cor pulmonale (CMS/HCC) I26.99   • UTI (urinary tract infection) N39.0   • Kidney stone on right side N20.0   • Hydronephrosis with ureteropelvic junction (UPJ) obstruction Q62.11   • Partial intestinal obstruction, unspecified as to cause (CMS/HCC) K56.600   • Irregular heart beat I49.9   • Hemorrhoids, external K64.4   • Hematochezia K92.1   • Gastrointestinal hemorrhage, unspecified K92.2   • Esophagitis K20.90   • Family history of colonic polyps Z83.71   • Chronic diarrhea K52.9   • Calculus of gallbladder K80.20   • Benign neoplasm of transverse colon D12.3   • Benign neoplasm of rectum and anal canal D12.8, D12.9   • Loss of weight R63.4   • Heart murmur R01.1   • Anemia D64.9   • Anticoagulation adequate Z79.01   • Pain associated with surgical procedure G89.18   • Iron deficiency E61.1   • Adverse effect of iron T45.4X5A   • Drug-induced peripheral neuropathy (CMS/HCC) G62.0   • On antineoplastic chemotherapy Z79.899   • Chemotherapy-induced thrombocytopenia D69.59, T45.1X5A   • HTN (hypertension) I10   • Ileostomy present (CMS/HCC) Z93.2   • Chronic anticoagulation Z79.01   • Chemotherapy-induced neutropenia (CMS/HCC) D70.1, T45.1X5A   • Hand foot syndrome L27.1   • Chemotherapy-induced thrombocytopenia D69.59, T45.1X5A   • Pulmonary nodule, right R91.1   • Abdominopelvic abscess (CMS/HCC) K65.1   • Perirectal abscess K61.1   • Pulmonary nodules R91.8   • Secondary cancer of lung (CMS/HCC) C78.00   • Leukocytopenia D72.819   • 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

## 2020-12-22 NOTE — PROGRESS NOTES
Continued Stay Note  [facility]     Patient Name: Carole Weaver  MRN: [id number]  Today's Date: 12/22/2020    Admit Date: 12/21/2020    Discharge Plan     Row Name 12/22/20 1528       Plan    Plan Comments  CCP spoke with aPtience[facility] coordinator nurse, pt to [place] home on Lovenox 100MG Subq q12 hours indefinitely. CCP discussed prescription to meds to beds and would speak with [facility] for medications. edil causey/ccp        Discharge Codes    No documentation.             Christiane Arreola, RN

## 2020-12-22 NOTE — PLAN OF CARE
Goal Outcome Evaluation:  Plan of Care Reviewed With: patient  Progress: improving  Outcome Summary: Hepp gtt for thrombosis in superior vena cave, IV and PO pain medications given, up with assist x1 to BR, patient emptied ilieostomy, Tylenol given for pain and slight temp 100.5, responded well, BP'S and heartrate elevated, Hem/Onc and vascular surgery consulted, on 2/3L O2, flushing and SOA with activity, will CTM

## 2020-12-22 NOTE — PROGRESS NOTES
Name: Carole Weaver ADMIT: 2020   : 1980  PCP: Deepika Vela III, KRISTINEC    MRN: [id number] LOS: 1 days   AGE/SEX: 40 y.o. female  ROOM: [facility]     Subjective   Subjective   Resting in bed. Denies any current pain. No nausea or vomiting. No dyspnea. Swelling in her arms and face has been her biggest complaint. One low grade fever yesterday. Had port replaced 4 days ago. No abdominal pain. Stool normal in bag- no bleeding. No dysuria. COVID negative on  and  but had nonspecific ground glass finding on CT- still in isolation for this reason.     Objective   Objective   Vital Signs  Temp:  [97.7 °F (36.5 °C)-100.4 °F (38 °C)] 97.7 °F (36.5 °C)  Heart Rate:  [108-131] 108  Resp:  [14-16] 16  BP: (132-146)/() 136/92  SpO2:  [93 %-97 %] 95 %  on  Flow (L/min):  [2-3] 3;   Device (Oxygen Therapy): nasal cannula  Body mass index is 37.2 kg/m².     Physical Exam  Vitals signs and nursing note reviewed.   Constitutional:       General: She is not in acute distress.     Appearance: She is ill-appearing (chronically). She is not toxic-appearing.   HENT:      Head: Normocephalic and atraumatic.   Neck:      Musculoskeletal: Normal range of motion and neck supple.   Cardiovascular:      Rate and Rhythm: Normal rate and regular rhythm.      Pulses: Normal pulses.      Heart sounds: Normal heart sounds.   Pulmonary:      Effort: Pulmonary effort is normal. No respiratory distress.      Breath sounds: Normal breath sounds.   Abdominal:      General: Bowel sounds are normal. There is no distension.      Palpations: Abdomen is soft.      Tenderness: There is no abdominal tenderness.      Comments: RLQ ileostomy with loose tan stool present.   Musculoskeletal: Normal range of motion.         General: Swelling (to bilateral upper extremities and face) present.   Skin:     General: Skin is warm and dry.      Coloration: Skin is pale.      Comments: Right chest incision intact.   Neurological:       Mental Status: She is alert and oriented to person, place, and time.      Sensory: No sensory deficit.      Motor: Weakness present.      Coordination: Coordination normal.   Psychiatric:         Mood and Affect: Mood normal.         Behavior: Behavior normal.       Results Review:       I reviewed the patient's new clinical results.  Results from last 7 days   Lab Units 12/22/20  0456 12/21/20  1123   WBC 10*3/mm3 7.12 10.29   HEMOGLOBIN g/dL 12.0 12.8   PLATELETS 10*3/mm3 254 195     Results from last 7 days   Lab Units 12/22/20  0456 12/21/20  1029   SODIUM mmol/L 134* 134*   POTASSIUM mmol/L 3.8 4.2   CHLORIDE mmol/L 100 99   CO2 mmol/L 22.5 21.0*   BUN mg/dL 10 9   CREATININE mg/dL 0.77 0.85   GLUCOSE mg/dL 115* 129*   Estimated Creatinine Clearance: 115.2 mL/min (by C-G formula based on SCr of 0.77 mg/dL).  Results from last 7 days   Lab Units 12/21/20  1029   ALBUMIN g/dL 3.50   BILIRUBIN mg/dL 0.8   ALK PHOS U/L 115   AST (SGOT) U/L 28   ALT (SGPT) U/L 29     Results from last 7 days   Lab Units 12/22/20  0456 12/21/20  1029   CALCIUM mg/dL 8.6 9.2   ALBUMIN g/dL  --  3.50     Results from last 7 days   Lab Units 12/21/20  1052   LACTATE mmol/L 1.5     No results found for: HGBA1C, POCGLU    cetirizine, 10 mg, Oral, Daily  Diclofenac Sodium, 4 g, Topical, TID  famotidine, 20 mg, Oral, BID  mirtazapine, 15 mg, Oral, Nightly      heparin (porcine), 14.7 Units/kg/hr, Last Rate: 16.7 Units/kg/hr (12/22/20 0754)    Diet Regular; Consistent Carbohydrate       Assessment/Plan     Active Hospital Problems    Diagnosis  POA   • **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     Thrombosis of superior vena cava  -Currently on heparin drip. Vascular is managing.  -Plans to monitor on heparin to avoid SVC stent, removal of port and thrombolysis. If needed, high risk for re-clot given her hypercoagulable state with Factor V and cancer.  -Monitor swelling.     Secondary cancer of lung/rectal cancer  -Followed by Dr. Nguyen and currently on systemic treatment via her port.   -Hematology/oncology to see.  -Pain medications as needed. Monitor labs.    Ileostomy present  -WOCN following. Patient manages this.    History of PE/Factor V Leiden  -Previously on Lovenox therapy for prevention and developed PE. Now on full dose Xarelto in the outpatient setting.  -Heparin drip continued in the acute setting. Defer oral therapy resumption to specialists.    HTN  -BP stable. Will monitor.    Thrombocytopenia  -Platelets normal. Monitor on heparin.      Discussed with patient and Dr. Arteaga.    Given ground-glass finding on CT with low grade fever x 1 yesterday, repeat COVID today at 1600. If negative, discontinue isolation. Low suspicion for actual infection.      VTE Prophylaxis - heparin  Code Status - Full code  Disposition - Anticipate discharge TBD.      JULIET Domínguez  [facility]  12/22/20  13:27 EST

## 2020-12-22 NOTE — CONSULTS
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    HISTORY OF PRESENT ILLNESS:  Carole Weaver is a 40 y.o. female who we are asked to see today in consultation for the above issues.  The patient is known to our practice, followed in the outpatient setting by Dr. Nguyen.  She has metastatic rectal cancer on chemotherapy with FOLFIRI, last received treatment with cycle 5 on 12/8/2020.  She is heterozygous for factor V Leiden mutation and has a history of thrombosis with prior pulmonary emboli in September 2019 despite being on prophylactic Lovenox 40 mg daily and has been continuing on Xarelto 20 mg daily ever since.  The patient has had difficulty with her current MediPort and was experiencing neck and shoulder pain after infusion through her port however her port was located on the left side and right neck/shoulder pain was occurring on the right side.  She underwent CT angiogram of the chest on 11/16/2020 which showed no evidence of pulmonary embolism.  She underwent MRI of the cervical and thoracic spine on 12/1/2020 which showed no evidence of metastatic disease and no obvious cause for her symptoms.  Port dye study on 12/8/2020 showed slow flow through the left subclavian port however catheter was patent with tip in the SVC.  The patient was seen by Dr. Joseph and there were plans to remove the left-sided port and replaced this on the right.  The patient held Lovenox on 12/16 and 12/17.  On 12/18/2020, she underwent procedure with Dr. Joseph with removal of left sided Mediport and replacement of Mediport on the right side.  This was performed without any complications.  The patient subsequently noted an increase in swelling involving her neck, face, right arm.  This progressed to the point where she presented to the emergency department on 12/21/2020 with CT of the  chest which showed left chest wall collateral vessels draining into the azygous vein, intercostal, left internal mammary veins with thrombosis of the brachiocephalic vein and SVC along with poor visualization of the right subclavian vein and SVC.  This was felt to represent thrombus in the SVC, left brachiocephalic vein and could not exclude thrombosis in the right internal jugular vein.  There is also a new small right pleural effusion and small pericardial effusion of unclear significance as well as compressive atelectasis in the right lower lobe and a questionable soft tissue fullness at the right hilum (possibly venous congestion) as well as patchy upper lobe groundglass opacities which were possibly suggestive of COVID-19.  The patient did undergo COVID-19 testing on 12/21/2020 which was negative.  The patient was admitted and placed on a heparin drip.  She has been seen by vascular surgery with notation of previous CT 9/9/2020 showing thrombus in the innominate vein around the port and current scan showing the same finding along with multiple collateral vessels however new thrombus noted in the right subclavian vein and junction with the SVC.  The innominate thrombosis was felt to be longstanding and it was doubtful that there would be significant benefit to thrombolysis/thrombectomy with high risk of rethrombosis and did not recommend intervention.    Today, the patient reports that her swelling in the right upper extremity, neck, face may be slightly improved from yesterday.  She has continued to experience some chronic issues with her chemotherapy, particularly in relation to swelling in her face and upper extremities after treatment.  Her abdominal wound that has been managed by Dr. Ye has nearly healed at this point with only a Band-Aid overlying the wound, no drainage.    Note that Dr. Nguyen plans to pursue a PET scan following cycle 6 FOLFIRI.  She is scheduled to return on 12/29/2020 to begin cycle  6.    Past Medical History:   Diagnosis Date   • Abdominopelvic abscess (CMS/HCC) 08/12/2020    ADMITTED TO [facility]   • Abnormal Pap smear of cervix 02/02/1998    JULIUS I   • Anemia in pregnancy    • Anxiety    • Bilateral epiphora 04/2020   • Bilateral hand swelling 05/02/2020    SEEN AT [facility] ER   • Cervical lymphadenitis 06/06/2012    SEEN AT [facility] ER   • Chemotherapy induced neutropenia (CMS/HCC) 04/2020   • Chemotherapy-induced nausea 02/2020   • Chemotherapy-induced thrombocytopenia 05/2020   • Chronic diarrhea    • Colon polyps     FOLLOWED BY DR. GERONIMO ESPARZA   • Cystitis 04/24/2020    WITH DEHYDRATION, ADMITTED TO [facility]   • Cystitis 10/27/2020   • Drug-induced peripheral neuropathy (CMS/HCC) 05/2020   • Fistula of intestine    • GERD (gastroesophageal reflux disease)    • Hand foot syndrome 05/2020   • Heart murmur     IN CHILDHOOD   • Hematochezia    • Hemorrhoids    • Heterozygous factor V Leiden mutation (CMS/HCC)     DX 8-2-2019   • History of anemia    • History of chemotherapy 2019    FOLLOWED BY DR. ALEXANDRU HUNT   • History of gestational diabetes    • History of pre-eclampsia    • History of radiation therapy 2019    FOLLOWED BY DR. JAVON LEWIS   • History of TB skin testing 01/17/2009    TB Skin Test   • HPV in female 1998   • Hypokalemia 09/2019   • Hypomagnesemia 09/2019   • IBS (irritable bowel syndrome)    • Ileostomy in place (CMS/HCC)     FOLLOWED BY DR. GERONIMO ESPARZA   • Infected insect bite of neck 05/27/2016    SEEN AT [facility]   • Kidney stones 08/09/2007    SEEN AT [facility] ER   • Lump of right breast     SWOLLEN LYMPH NODE   • Lung cancer (CMS/HCC) 09/28/2020    METASTATIC LUNG CANCER   • Monopolar depression (CMS/HCC)    • On anticoagulant therapy    • Perirectal abscess 08/12/2020   • PIH (pregnancy induced hypertension) 1998   • Pulmonary embolism without acute cor pulmonale (CMS/HCC) 09/20/2019    X 3; ADMITTED TO [facility]   • Pulmonary nodule, right 05/2020   • Rectal cancer (CMS/HCC) 07/08/2019     STAGE IIA, INVASIVE MODERATELY DIFFERENTIATED ADENOCARCINOMA, CHEMO AND XRT FINISHED 2019   • Right shoulder pain 2020    SEEN AT [facility] ER   • Right ureteral stone 10/01/2019    SEEN AT [facility] ER   • SAB (spontaneous ) 1996     A2-1 INDUCED   • Sciatica    • Sepsis due to cellulitis (CMS/HCC) 2002    LEFT GREAT TOE, ADMITTED TO [facility]   • Tachycardia 2020   • Urinary urgency 2020       Past Surgical History:   Procedure Laterality Date   • CHOLECYSTECTOMY N/A 10/10/2003    LAPAROSCOPIC WITH CHOLANGIOGRAM, DR. JAMEY TALAVERA AT [facility]   • COLON RESECTION N/A 2019    Procedure: laparoscopic low anterior colon resection with mobilization of splenic flexure and diverting loop ileostomy: ERAS;  Surgeon: Padmaja Esparza MD;  Location: [facility] OR;  Service: General   • COLON RESECTION N/A 8/3/2020    Procedure: LOW ANTERIOR COLON RESECTION, COLOANAL ANASTOMOSIS, MOBILIZATION SPLENIC FLEXURE;  Surgeon: Padmaja Esparza MD;  Location: [facility] OR;  Service: General;  Laterality: N/A;   • COLONOSCOPY N/A 7/15/2020    PATENT ANASTAMOSIS IN MID RECTUM, RESCOPE IN 1 YR, DR. PADMAJA ESPARZA AT [facility]   • COLONOSCOPY W/ POLYPECTOMY N/A 2019    15 MM TUBULOVILLOUS ADENOMA POLYP IN HEPATIC FLEXURE, 20 MMTUBULOVILLOUS ADENOMA WITH HIGH GRADE DYSPLASIA IN RECTOSIGMOID, 4 CM MASS IN MID RECTUM, PATH: INVASIVE MODERATELY DIFFERENTIATED ADENOCARCINOMA, DR. JENNIFER LI AT [facility]   • DILATATION AND EVACUATION N/A    • ENDOSCOPY N/A 2019    LA GRADE A ESOPHAGITIS, GASTRITIS, ALL BIOPSIES BENIGN, DR. JENNIFER LI AT [facility]   • INCISION AND DRAINAGE PERIRECTAL ABSCESS N/A 2020    Procedure: INCISION AND DRAINAGE OF retrorectal dehiscence abcess with drain placement and irrigation;  Surgeon: Padmaja Esparza MD;  Location: [facility] OR;  Service: General;  Laterality: N/A;   • PAP SMEAR N/A 2014   • SIGMOIDOSCOPY N/A 2019    INFILTRATIVE  PARTIALLY OBSTRUCTING LARGE RECTAL CANCER, AREA TATOOED, DR. PADMAJA ESPARZA AT [facility]   • SIGMOIDOSCOPY N/A 11/23/2019    AN ULCERATED PARTIALLY OBSTRUCTING MASS IN MID RECTUM, AREA TATTOOED, DR. PADMAJA ESPARZA AT [facility]   • TRANSRECTAL ULTRASOUND N/A 7/24/2019    Procedure: ULTRASOUND TRANSRECTAL;  Surgeon: Padmaja Esparza MD;  Location: [facility] ENDOSCOPY;  Service: General   • URETEROSCOPY LASER LITHOTRIPSY WITH STENT INSERTION Right 10/30/2019    DR. ESTUARDO BEASLEY AT [place]   • VAGINAL DELIVERY N/A 09/18/1998   • VENOUS ACCESS DEVICE (PORT) INSERTION Left 8/9/2019    Procedure: INSERTION VENOUS ACCESS DEVICE;  Surgeon: Sj Joseph MD;  Location: [facility] OR [facility];  Service: General   • VENOUS ACCESS DEVICE (PORT) INSERTION N/A 12/18/2020    Procedure: INSERTION VENOUS ACCESS DEVICE right side, removal venous access device left side;  Surgeon: Sj Joseph MD;  Location: [facility] OR [facility];  Service: General;  Laterality: N/A;   • WISDOM TOOTH EXTRACTION Bilateral 1993       SOCIAL HISTORY:   reports that she has been smoking electronic cigarette and cigarettes. She has a 24.00 pack-year smoking history. She has never used smokeless tobacco. She reports previous alcohol use. She reports that she does not use drugs.    FAMILY HISTORY:  family history includes Cancer in her paternal uncle; Colon cancer in her maternal uncle and paternal uncle; Colon polyps in her mother; Diabetes in her paternal uncle; Factor V Leiden deficiency in her mother; Heart disease in her mother and paternal grandfather; Hyperlipidemia in her mother; Hypertension in her mother; Mental illness in her sister; No Known Problems in her son; Skin cancer in her father.    ALLERGIES:  No Known Allergies    MEDICATIONS:  As listed in the electronic medical record.    Review of Systems a comprehensive review of systems was obtained with pertinent positive findings as outlined in the history of present illness above.  All other systems  negative.    Vitals:    12/22/20 0057 12/22/20 0543 12/22/20 0700 12/22/20 1334   BP: 132/92  136/92 137/84   BP Location: Left leg  Left leg Left leg   Patient Position: Lying  Lying Lying   Pulse:   108 120   Resp: 16  16 16   Temp: 98.3 °F (36.8 °C)  97.7 °F (36.5 °C) 98.3 °F (36.8 °C)   TempSrc: Oral  Oral Oral   SpO2:   95% 93%   Weight:  103 kg (226 lb)     Height:           Physical Exam  Constitutional:       Appearance: She is well-developed.   HENT:      Head:      Comments: Generalized fullness/swelling noted in the face and neck.   Eyes:      Conjunctiva/sclera: Conjunctivae normal.   Neck:      Thyroid: No thyromegaly.      Comments: Surgical incisions noted in the right and left subclavian areas  Cardiovascular:      Rate and Rhythm: Normal rate and regular rhythm.      Heart sounds: No murmur. No friction rub. No gallop.    Pulmonary:      Effort: No respiratory distress.      Breath sounds: Normal breath sounds.   Abdominal:      General: Bowel sounds are normal. There is no distension.      Palpations: Abdomen is soft.      Tenderness: There is no abdominal tenderness.      Comments: Midline surgical incision with healing wound in the lower portion with Band-Aid overlying, no drainage.  Ostomy in place, appears normal   Musculoskeletal:         General: Swelling present.      Comments: Edema noted in the bilateral upper extremities.  No edema noted in the lower extremities.   Lymphadenopathy:      Head:      Right side of head: No submandibular adenopathy.      Cervical: No cervical adenopathy.      Upper Body:      Right upper body: No supraclavicular adenopathy.      Left upper body: No supraclavicular adenopathy.   Skin:     General: Skin is warm and dry.      Findings: No rash.   Neurological:      Mental Status: She is alert and oriented to person, place, and time.      Cranial Nerves: No cranial nerve deficit.      Motor: No abnormal muscle tone.      Deep Tendon Reflexes: Reflexes normal.    Psychiatric:         Behavior: Behavior normal.         DIAGNOSTIC DATA:  WBC   Date Value Ref Range Status   12/22/2020 7.12 3.40 - 10.80 10*3/mm3 Final     RBC   Date Value Ref Range Status   12/22/2020 3.64 (L) 3.77 - 5.28 10*6/mm3 Final     Hemoglobin   Date Value Ref Range Status   12/22/2020 12.0 12.0 - 15.9 g/dL Final     Hematocrit   Date Value Ref Range Status   12/22/2020 34.5 34.0 - 46.6 % Final     MCV   Date Value Ref Range Status   12/22/2020 94.8 79.0 - 97.0 fL Final     MCH   Date Value Ref Range Status   12/22/2020 33.0 26.6 - 33.0 pg Final     MCHC   Date Value Ref Range Status   12/22/2020 34.8 31.5 - 35.7 g/dL Final     RDW   Date Value Ref Range Status   12/22/2020 17.6 (H) 12.3 - 15.4 % Final     RDW-SD   Date Value Ref Range Status   12/22/2020 60.2 (H) 37.0 - 54.0 fl Final     MPV   Date Value Ref Range Status   12/22/2020 10.4 6.0 - 12.0 fL Final     Platelets   Date Value Ref Range Status   12/22/2020 254 140 - 450 10*3/mm3 Final     Neutrophil %   Date Value Ref Range Status   12/22/2020 66.9 42.7 - 76.0 % Final     Lymphocyte %   Date Value Ref Range Status   12/22/2020 12.9 (L) 19.6 - 45.3 % Final     Monocyte %   Date Value Ref Range Status   12/22/2020 15.9 (H) 5.0 - 12.0 % Final     Eosinophil %   Date Value Ref Range Status   12/22/2020 3.5 0.3 - 6.2 % Final     Basophil %   Date Value Ref Range Status   12/22/2020 0.4 0.0 - 1.5 % Final     Immature Grans %   Date Value Ref Range Status   12/22/2020 0.4 0.0 - 0.5 % Final     Neutrophils, Absolute   Date Value Ref Range Status   12/22/2020 4.76 1.70 - 7.00 10*3/mm3 Final     Lymphocytes, Absolute   Date Value Ref Range Status   12/22/2020 0.92 0.70 - 3.10 10*3/mm3 Final     Monocytes, Absolute   Date Value Ref Range Status   12/22/2020 1.13 (H) 0.10 - 0.90 10*3/mm3 Final     Eosinophils, Absolute   Date Value Ref Range Status   12/22/2020 0.25 0.00 - 0.40 10*3/mm3 Final     Basophils, Absolute   Date Value Ref Range Status    12/22/2020 0.03 0.00 - 0.20 10*3/mm3 Final     Immature Grans, Absolute   Date Value Ref Range Status   12/22/2020 0.03 0.00 - 0.05 10*3/mm3 Final     nRBC   Date Value Ref Range Status   12/22/2020 0.0 0.0 - 0.2 /100 WBC Final       Glucose   Date Value Ref Range Status   12/22/2020 115 (H) 65 - 99 mg/dL Final     Sodium   Date Value Ref Range Status   12/22/2020 134 (L) 136 - 145 mmol/L Final     Potassium   Date Value Ref Range Status   12/22/2020 3.8 3.5 - 5.2 mmol/L Final     Comment:     Slight hemolysis detected by analyzer. Results may be affected.     CO2   Date Value Ref Range Status   12/22/2020 22.5 22.0 - 29.0 mmol/L Final     Chloride   Date Value Ref Range Status   12/22/2020 100 98 - 107 mmol/L Final     Anion Gap   Date Value Ref Range Status   12/22/2020 11.5 5.0 - 15.0 mmol/L Final     Creatinine   Date Value Ref Range Status   12/22/2020 0.77 0.57 - 1.00 mg/dL Final     BUN   Date Value Ref Range Status   12/22/2020 10 6 - 20 mg/dL Final     BUN/Creatinine Ratio   Date Value Ref Range Status   12/22/2020 13.0 7.0 - 25.0 Final     Calcium   Date Value Ref Range Status   12/22/2020 8.6 8.6 - 10.5 mg/dL Final     eGFR Non  Amer   Date Value Ref Range Status   12/22/2020 83 >60 mL/min/1.73 Final     Alkaline Phosphatase   Date Value Ref Range Status   12/21/2020 115 39 - 117 U/L Final     Total Protein   Date Value Ref Range Status   12/21/2020 7.3 6.0 - 8.5 g/dL Final     ALT (SGPT)   Date Value Ref Range Status   12/21/2020 29 1 - 33 U/L Final     Comment:     Specimen hemolyzed.  Results may be affected.     AST (SGOT)   Date Value Ref Range Status   12/21/2020 28 1 - 32 U/L Final     Comment:     Specimen hemolyzed.  Results may be affected.     Total Bilirubin   Date Value Ref Range Status   12/21/2020 0.8 0.0 - 1.2 mg/dL Final     Albumin   Date Value Ref Range Status   12/21/2020 3.50 3.50 - 5.20 g/dL Final     Globulin   Date Value Ref Range Status   12/21/2020 3.8 gm/dL Final          Assessment/Plan   ASSESSMENT:  This is a 40 y.o. female with:    *Metastatic rectal adenocarcinoma  • Patient initially had clinical stage IIA ([id number]) disease and received neoadjuvant concurrent chemoradiation with continuous infusion 5-FU beginning 8/12/2019  • Surgical resection with diverting loop ileostomy 12/2/2019 with Dr. Ye.  Pathology with residual stage IIIB ([id number]) disease with 1 of 14 lymph nodes positive.  Foundation medicine CDX: JAYME, TMB 5muts/Mb, KRAS mutated  • Patient received adjuvant chemotherapy with FOLFOX x8 cycles completed 5/13/2020.  Poor tolerance to 5-FU with hand-foot syndrome.  • Low anterior resection with coloanal anastomosis, mobilization of splenic flexure on 8/3/2020 with pathology showing fat necrosis and an omental nodule, benign tissue at rectal anastomosis with serositis, no evidence of malignancy.  • Perirectal abscess requiring incision and drainage on 8/27/2020  • PET scan 9/18/2020 with bilateral pulmonary nodules increased in size with hypermetabolic activity likely representing metastatic disease, loculated fluid collection around the presacral soft tissues with air consistent with rectal leak, possible rectovaginal fistula, ill-defined soft tissue thickening throughout the abdomen and pelvis likely corresponding to partially loculated fluid collection and felt to probably be reactive but could not rule out peritoneal spread of malignancy.  • Initiated palliative FOLFIRI chemotherapy 10/13/2020  • Notation on CT angiogram chest 11/16/2020 of left lower lobe nodule 5 mm slightly decreased from prior imaging and right upper lobe nodule with decrease in size, no new nodules identified.  • Last received FOLFIRI on 12/8/2020, cycle 5.  Scheduled for cycle 6 on 12/29/2020.  Plans for PET scan following cycle 6.    *Factor V Leiden heterozygosity with history of pulmonary embolism in September 2019 and chronic left innominate vein thrombosis along with acute  right subclavian and SVC thrombus 12/21/2020  • Patient is known factor V Leiden heterozygote  • Patient had been receiving low-dose Lovenox 40 mg daily as prophylaxis due to presence of MediPort and underlying malignancy when she developed pulmonary emboli 9/20/2019.  Low-dose Lovenox discontinued and initiated Xarelto 20 mg daily.  • Patient with apparent understanding chronic thrombosis of left innominate vein likely associated with MediPort, was evident per vascular surgery from CT scan in September 2020.  • Patient held Xarelto for 2 days prior to MediPort removal from the left chest wall and placement of new port in the right chest wall on 12/18/2020.  She resume Xarelto that evening.  • Progressive swelling in the neck, face, upper extremities prompting current hospitalization with CT scan showing thrombosis in the right subclavian vein and SVC.  • Patient with clear port associated thrombosis in the setting of factor V Leiden heterozygosity and active metastatic malignancy.  She is currently continuing on heparin drip with slight decrease in edema today.  She has been seen by vascular surgery and there is no recommendation to pursue any intervention at this time but to manage conservatively with anticoagulation.  Although she had been off of Xarelto for a few days, clearly Xarelto was not prohibiting progression of her thrombosis after she resumed treatment.  She likely needs transition to another agent.  The patient felt that she had failed Lovenox in the past however in reviewing records it appears she was only receiving prophylactic dose Lovenox at 40 mg daily.  Full treatment dose Lovenox would be the best way to manage her current situation at 1 mg/kg every 12 hours.  We will continue for now on a heparin drip and plan to transition in the next day or 2 to Lovenox and will continue this as outpatient.    *Abdominal abscess/wound  • The patient has a slowly healing wound in the midportion of her  abdominal incision.  There is only a Band-Aid present currently with no drainage.      PLAN:   1. Continue heparin drip for now  2. After further improvement in patient's facial/neck/upper extremity edema, consider transition to Lovenox 1 mg/kg every 12 hours with plans to continue this in the outpatient setting  3. Daily CBC, PTT        Pablo Raygoza MD

## 2020-12-22 NOTE — PROGRESS NOTES
Case Management Discharge Note                Selected Continued Care - Admitted Since 12/21/2020     Destination    No services have been selected for the patient.              Durable Medical Equipment    No services have been selected for the patient.              Dialysis/Infusion    No services have been selected for the patient.              Home Medical Care    No services have been selected for the patient.              Therapy    No services have been selected for the patient.              Community Resources    No services have been selected for the patient.

## 2020-12-22 NOTE — PLAN OF CARE
Goal Outcome Evaluation:  Plan of Care Reviewed With: patient  Progress: no change  Outcome Summary: Pt stated that her edema has decreased a little bit since heparin gtt was started. CT of chest revealed what could possibly be covid-19. ID doctor reviewed patient's chart and ordered another covid test. Awaiting for results. Pt has been thearpeutic this shift, not requiring any rate changes for the heparin. Rechecking around 1745. no complaints at this time. Will CTM the rest of my shift.

## 2020-12-23 ENCOUNTER — APPOINTMENT (OUTPATIENT)
Dept: CT IMAGING | Facility: HOSPITAL | Age: 40
End: 2020-12-23

## 2020-12-23 PROBLEM — R00.0 TACHYCARDIA: Status: ACTIVE | Noted: 2020-12-23

## 2020-12-23 PROBLEM — E87.1 HYPONATREMIA: Status: ACTIVE | Noted: 2020-12-23

## 2020-12-23 LAB
ANION GAP SERPL CALCULATED.3IONS-SCNC: 9 MMOL/L (ref 5–15)
APTT PPP: 121.8 SECONDS (ref 22.7–35.4)
APTT PPP: 56.1 SECONDS (ref 22.7–35.4)
APTT PPP: 82.3 SECONDS (ref 22.7–35.4)
BASOPHILS # BLD AUTO: 0.02 10*3/MM3 (ref 0–0.2)
BASOPHILS NFR BLD AUTO: 0.3 % (ref 0–1.5)
BUN SERPL-MCNC: 8 MG/DL (ref 6–20)
BUN/CREAT SERPL: 13.3 (ref 7–25)
CALCIUM SPEC-SCNC: 8.7 MG/DL (ref 8.6–10.5)
CHLORIDE SERPL-SCNC: 97 MMOL/L (ref 98–107)
CO2 SERPL-SCNC: 24 MMOL/L (ref 22–29)
CREAT SERPL-MCNC: 0.6 MG/DL (ref 0.57–1)
DEPRECATED RDW RBC AUTO: 58.7 FL (ref 37–54)
EOSINOPHIL # BLD AUTO: 0.18 10*3/MM3 (ref 0–0.4)
EOSINOPHIL NFR BLD AUTO: 3 % (ref 0.3–6.2)
ERYTHROCYTE [DISTWIDTH] IN BLOOD BY AUTOMATED COUNT: 17.3 % (ref 12.3–15.4)
GFR SERPL CREATININE-BSD FRML MDRD: 110 ML/MIN/1.73
GLUCOSE SERPL-MCNC: 99 MG/DL (ref 65–99)
HCT VFR BLD AUTO: 35.3 % (ref 34–46.6)
HGB BLD-MCNC: 11.8 G/DL (ref 12–15.9)
IMM GRANULOCYTES # BLD AUTO: 0.04 10*3/MM3 (ref 0–0.05)
IMM GRANULOCYTES NFR BLD AUTO: 0.7 % (ref 0–0.5)
LYMPHOCYTES # BLD AUTO: 0.85 10*3/MM3 (ref 0.7–3.1)
LYMPHOCYTES NFR BLD AUTO: 14.2 % (ref 19.6–45.3)
MCH RBC QN AUTO: 31.5 PG (ref 26.6–33)
MCHC RBC AUTO-ENTMCNC: 33.4 G/DL (ref 31.5–35.7)
MCV RBC AUTO: 94.1 FL (ref 79–97)
MONOCYTES # BLD AUTO: 0.81 10*3/MM3 (ref 0.1–0.9)
MONOCYTES NFR BLD AUTO: 13.5 % (ref 5–12)
NEUTROPHILS NFR BLD AUTO: 4.1 10*3/MM3 (ref 1.7–7)
NEUTROPHILS NFR BLD AUTO: 68.3 % (ref 42.7–76)
NRBC BLD AUTO-RTO: 0.2 /100 WBC (ref 0–0.2)
PLATELET # BLD AUTO: 206 10*3/MM3 (ref 140–450)
PMV BLD AUTO: 9.3 FL (ref 6–12)
POTASSIUM SERPL-SCNC: 3.5 MMOL/L (ref 3.5–5.2)
PROCALCITONIN SERPL-MCNC: 0.15 NG/ML (ref 0–0.25)
RBC # BLD AUTO: 3.75 10*6/MM3 (ref 3.77–5.28)
SODIUM SERPL-SCNC: 130 MMOL/L (ref 136–145)
WBC # BLD AUTO: 6 10*3/MM3 (ref 3.4–10.8)

## 2020-12-23 PROCEDURE — 0 IOPAMIDOL PER 1 ML: Performed by: INTERNAL MEDICINE

## 2020-12-23 PROCEDURE — 85025 COMPLETE CBC W/AUTO DIFF WBC: CPT | Performed by: NURSE PRACTITIONER

## 2020-12-23 PROCEDURE — 85730 THROMBOPLASTIN TIME PARTIAL: CPT | Performed by: NURSE PRACTITIONER

## 2020-12-23 PROCEDURE — 84145 PROCALCITONIN (PCT): CPT | Performed by: NURSE PRACTITIONER

## 2020-12-23 PROCEDURE — 71275 CT ANGIOGRAPHY CHEST: CPT

## 2020-12-23 PROCEDURE — 25010000002 HYDROMORPHONE 1 MG/ML SOLUTION: Performed by: INTERNAL MEDICINE

## 2020-12-23 PROCEDURE — 85730 THROMBOPLASTIN TIME PARTIAL: CPT | Performed by: INTERNAL MEDICINE

## 2020-12-23 PROCEDURE — 99233 SBSQ HOSP IP/OBS HIGH 50: CPT | Performed by: INTERNAL MEDICINE

## 2020-12-23 PROCEDURE — 25010000002 HEPARIN (PORCINE) PER 1000 UNITS: Performed by: NURSE PRACTITIONER

## 2020-12-23 PROCEDURE — 80048 BASIC METABOLIC PNL TOTAL CA: CPT | Performed by: NURSE PRACTITIONER

## 2020-12-23 RX ORDER — SODIUM CHLORIDE 0.9 % (FLUSH) 0.9 %
10 SYRINGE (ML) INJECTION AS NEEDED
Status: DISCONTINUED | OUTPATIENT
Start: 2020-12-23 | End: 2020-12-25 | Stop reason: HOSPADM

## 2020-12-23 RX ORDER — SODIUM CHLORIDE 0.9 % (FLUSH) 0.9 %
20 SYRINGE (ML) INJECTION AS NEEDED
Status: DISCONTINUED | OUTPATIENT
Start: 2020-12-23 | End: 2020-12-25 | Stop reason: HOSPADM

## 2020-12-23 RX ORDER — HEPARIN SODIUM (PORCINE) LOCK FLUSH IV SOLN 100 UNIT/ML 100 UNIT/ML
5 SOLUTION INTRAVENOUS AS NEEDED
Status: DISCONTINUED | OUTPATIENT
Start: 2020-12-23 | End: 2020-12-24

## 2020-12-23 RX ORDER — SODIUM CHLORIDE 0.9 % (FLUSH) 0.9 %
10 SYRINGE (ML) INJECTION EVERY 12 HOURS SCHEDULED
Status: DISCONTINUED | OUTPATIENT
Start: 2020-12-23 | End: 2020-12-25 | Stop reason: HOSPADM

## 2020-12-23 RX ADMIN — HYDROMORPHONE HYDROCHLORIDE 1 MG: 1 INJECTION, SOLUTION INTRAMUSCULAR; INTRAVENOUS; SUBCUTANEOUS at 13:27

## 2020-12-23 RX ADMIN — HYDROCODONE BITARTRATE AND ACETAMINOPHEN 1 TABLET: 7.5; 325 TABLET ORAL at 20:47

## 2020-12-23 RX ADMIN — SODIUM CHLORIDE, PRESERVATIVE FREE 10 ML: 5 INJECTION INTRAVENOUS at 20:48

## 2020-12-23 RX ADMIN — HEPARIN SODIUM 8200 UNITS: 5000 INJECTION INTRAVENOUS; SUBCUTANEOUS at 04:20

## 2020-12-23 RX ADMIN — HEPARIN SODIUM 20.7 UNITS/KG/HR: 10000 INJECTION, SOLUTION INTRAVENOUS at 13:33

## 2020-12-23 RX ADMIN — HYDROMORPHONE HYDROCHLORIDE 1 MG: 1 INJECTION, SOLUTION INTRAMUSCULAR; INTRAVENOUS; SUBCUTANEOUS at 01:17

## 2020-12-23 RX ADMIN — HYDROMORPHONE HYDROCHLORIDE 1 MG: 1 INJECTION, SOLUTION INTRAMUSCULAR; INTRAVENOUS; SUBCUTANEOUS at 18:49

## 2020-12-23 RX ADMIN — SODIUM CHLORIDE, PRESERVATIVE FREE 10 ML: 5 INJECTION INTRAVENOUS at 08:31

## 2020-12-23 RX ADMIN — HEPARIN SODIUM 16.7 UNITS/KG/HR: 10000 INJECTION, SOLUTION INTRAVENOUS at 00:06

## 2020-12-23 RX ADMIN — CETIRIZINE HYDROCHLORIDE 10 MG: 10 TABLET ORAL at 08:31

## 2020-12-23 RX ADMIN — FAMOTIDINE 20 MG: 20 TABLET, FILM COATED ORAL at 08:31

## 2020-12-23 RX ADMIN — DICLOFENAC SODIUM 4 G: 10 GEL TOPICAL at 16:22

## 2020-12-23 RX ADMIN — IOPAMIDOL 95 ML: 755 INJECTION, SOLUTION INTRAVENOUS at 01:34

## 2020-12-23 RX ADMIN — FAMOTIDINE 20 MG: 20 TABLET, FILM COATED ORAL at 20:43

## 2020-12-23 RX ADMIN — DICLOFENAC SODIUM 4 G: 10 GEL TOPICAL at 09:22

## 2020-12-23 RX ADMIN — ACETAMINOPHEN 650 MG: 325 TABLET, FILM COATED ORAL at 18:49

## 2020-12-23 RX ADMIN — HEPARIN SODIUM 17.7 UNITS/KG/HR: 10000 INJECTION, SOLUTION INTRAVENOUS at 16:19

## 2020-12-23 RX ADMIN — MIRTAZAPINE 15 MG: 15 TABLET, ORALLY DISINTEGRATING ORAL at 20:43

## 2020-12-23 NOTE — PROGRESS NOTES
Name: Carole Weaver ADMIT: 2020   : 1980  PCP: Deepika Vela III, KRISTINEC    MRN: [id number] LOS: 2 days   AGE/SEX: 40 y.o. female  ROOM: [facility]     Subjective   Subjective   Resting in bed. Did have some SOA overnight prompting repeat CTA- negative for acute findings. Patient states he feels better today. No dyspnea or chest pain. No cough/fever/chills. She thinks her swelling has improved some. Can move her right arm better. No nausea or vomiting. No abdominal pain. Does not feel like she needs the oxygen in her nose.     Objective   Objective   Vital Signs  Temp:  [98.1 °F (36.7 °C)-98.4 °F (36.9 °C)] 98.4 °F (36.9 °C)  Heart Rate:  [109-123] 109  Resp:  [16-20] 20  BP: (110-137)/(65-84) 110/72  SpO2:  [92 %-95 %] 94 %  on  Flow (L/min):  [3] 3;   Device (Oxygen Therapy): nasal cannula  Body mass index is 38 kg/m².     Physical Exam  Vitals signs and nursing note reviewed.   Constitutional:       General: She is not in acute distress.     Appearance: She is ill-appearing (chronically). She is not toxic-appearing.   Cardiovascular:      Rate and Rhythm: Tachycardia present and regular rhythm.      Pulses: Normal pulses.      Heart sounds: Normal heart sounds.   Pulmonary:      Effort: Pulmonary effort is normal. No respiratory distress.      Breath sounds: Normal breath sounds. On 3L - this was removed and hanging at 94% while in room.  Abdominal:      General: Bowel sounds are normal. There is no distension.      Palpations: Abdomen is soft.      Tenderness: There is no abdominal tenderness.      Comments: RLQ ileostomy with loose tan stool present.   Musculoskeletal: Normal range of motion.         General: Swelling (to bilateral upper extremities and face) present but does seem improved today.  Skin:     General: Skin is warm and dry.      Coloration: Skin is pale.      Comments: Right chest incision intact.   Neurological:      Mental Status: She is alert and oriented to person,  place, and time.      Sensory: No sensory deficit.      Motor: Weakness present.      Coordination: Coordination normal.   Psychiatric:         Mood and Affect: Mood normal.         Behavior: Behavior normal.     Results Review:       I reviewed the patient's new clinical results.  Results from last 7 days   Lab Units 12/23/20  0308 12/22/20 0456 12/21/20  1123   WBC 10*3/mm3 6.00 7.12 10.29   HEMOGLOBIN g/dL 11.8* 12.0 12.8   PLATELETS 10*3/mm3 206 254 195     Results from last 7 days   Lab Units 12/23/20  0308 12/22/20 0456 12/21/20  1029   SODIUM mmol/L 130* 134* 134*   POTASSIUM mmol/L 3.5 3.8 4.2   CHLORIDE mmol/L 97* 100 99   CO2 mmol/L 24.0 22.5 21.0*   BUN mg/dL 8 10 9   CREATININE mg/dL 0.60 0.77 0.85   GLUCOSE mg/dL 99 115* 129*   Estimated Creatinine Clearance: 149.4 mL/min (by C-G formula based on SCr of 0.6 mg/dL).  Results from last 7 days   Lab Units 12/21/20  1029   ALBUMIN g/dL 3.50   BILIRUBIN mg/dL 0.8   ALK PHOS U/L 115   AST (SGOT) U/L 28   ALT (SGPT) U/L 29     Results from last 7 days   Lab Units 12/23/20  0308 12/22/20 0456 12/21/20  1029   CALCIUM mg/dL 8.7 8.6 9.2   ALBUMIN g/dL  --   --  3.50     Results from last 7 days   Lab Units 12/23/20  0308 12/21/20  1052   PROCALCITONIN ng/mL 0.15  --    LACTATE mmol/L  --  1.5     No results found for: HGBA1C, POCGLU    cetirizine, 10 mg, Oral, Daily  Diclofenac Sodium, 4 g, Topical, TID  famotidine, 20 mg, Oral, BID  mirtazapine, 15 mg, Oral, Nightly  sodium chloride, 10 mL, Intravenous, Q12H      heparin (porcine), 14.7 Units/kg/hr, Last Rate: 20.7 Units/kg/hr (12/23/20 0421)    Diet Regular       Assessment/Plan     Active Hospital Problems    Diagnosis  POA   • **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     Thrombosis of superior vena cava  -Currently on heparin drip. Vascular is managing.  -Plans to monitor on heparin to avoid SVC stent, removal of port and thrombolysis. If needed, high risk for re-clot given her hypercoagulable state with Factor V and cancer.  -Monitor swelling. This does seem to be improving today. Await vascular input.  -Hematology recommends transition to weight-based Lovenox from heparin at discharge given failure of Xarelto preventing progression of thrombosis.     Secondary cancer of lung/rectal cancer  -Followed by Dr. Nguyen and currently on systemic treatment via her port.   -Hematology/oncology following.   -Pain medications as needed. Monitor labs.     Ileostomy present  -WOCN following. Patient manages this.     History of PE/Factor V Leiden  -Previously on Lovenox therapy (low dose) for prevention and developed PE. Now on full dose Xarelto in the outpatient setting. Now back with recurrent clotting.   -Heparin drip continued in the acute setting until swelling improves. Then transition to Lovenox weight-based.     HTN  -BP stable. Will monitor.     Thrombocytopenia  -Platelets normal. Monitor on heparin.    Hyponatremia  -Drop to 130 overnight. Asymptomatic so will monitor for now.   -If lower in AM, will send for urine studies, but suspect this is multifactorial given pain/stress/cancer.     Tachycardia  -Somewhat persistent. EKG on admission showing ST as well as negative troponin. No cardiac complaints. Suspect this is due to her thrombosis, but will monitor closely.  -CTA did show possible bilateral upper lobe pneumonia,  but clinically lacks symptoms. Added procal to labs this morning which is negative. COVID19 negative x3. Will add IS for some presumed atelectasis with her decreased mobility. Wean oxygen as needed and see if she tolerates. May need to do walking oximetry as well.  Monitor for any fever or leukocytosis.      Discussed with patient, nursing staff and Dr. Arteaga.         VTE Prophylaxis - heparin  Code Status - Full code  Disposition - Anticipate discharge TBD.      JULIET Domínguez  [facility]  12/23/20  10:10 EST

## 2020-12-23 NOTE — PROGRESS NOTES
Discharge Planning Assessment  [facility]     Patient Name: Carole Weaver  MRN: [id number]  Today's Date: 12/23/2020    Admit Date: 12/21/2020    Discharge Needs Assessment     Row Name 12/23/20 1056       Living Environment    Lives With  spouse;child(zandra), dependent    Current Living Arrangements  home/apartment/condo    Primary Care Provided by  self    Provides Primary Care For  no one    Family Caregiver if Needed  spouse    Quality of Family Relationships  helpful;involved;supportive    Able to Return to Prior Arrangements  yes       Resource/Environmental Concerns    Resource/Environmental Concerns  none    Transportation Concerns  car, none       Transition Planning    Patient/Family Anticipates Transition to  home with family    Patient/Family Anticipated Services at Transition  none    Transportation Anticipated  car, drives self;family or friend will provide       Discharge Needs Assessment    Readmission Within the Last 30 Days  no previous admission in last 30 days    Equipment Currently Used at Home  none    Concerns to be Addressed  medication;denies needs/concerns at this time    Anticipated Changes Related to Illness  none    Equipment Needed After Discharge  none    Current Discharge Risk  chronically ill        Discharge Plan     Row Name 12/23/20 1057       Plan    Plan  Home with spouse and Lovenox RX Prescription dispenses from [facility]    Provided Post Acute Provider List?  N/A    Patient/Family in Agreement with Plan  yes    Plan Comments  CCP attempted to reach patient via telephone, no answer. CCP called pts son Justus [phone] via outbound introduced self and explained CCP role. Verified facesheet and confirmed local pharmacy is [facility], pt spouse denies problems with medication management. Pt lives at home with spouse and their son. Prior to admission pt was IADL with mobility. Pt denies any DME or SNF hx. Pt has used [facility] in the past after surgeries. Plan is home at  dc and they deny any dc needs. CCP explained Dr. Raygoza ordered Lovenox RX at [place] and copay is $0. Spouse requests further discussion with MD regarding using lovenox before. Spoke with Dr. Raygoza and he will discuss on rounds. Plan is home with spouse and Lovenox RX thru [facility]. edil LEONE/CCP        Continued Care and Services - Admitted Since 12/21/2020    Coordination has not been started for this encounter.         Demographic Summary     Row Name 12/23/20 1056       General Information    Admission Type  inpatient    Referral Source  admission list    Reason for Consult  discharge planning    Preferred Language  English     Used During This Interaction  no       Contact Information    Permission Granted to Share Info With  family/designee        Functional Status     Row Name 12/23/20 1053       Functional Status    Usual Activity Tolerance  good    Current Activity Tolerance  moderate       Functional Status, IADL    Medications  independent    Meal Preparation  independent    Housekeeping  independent    Laundry  independent    Shopping  independent       Mental Status    General Appearance WDL  WDL       Mental Status Summary    Recent Changes in Mental Status/Cognitive Functioning  no changes        Psychosocial    No documentation.       Abuse/Neglect    No documentation.       Legal    No documentation.       Substance Abuse    No documentation.       Patient Forms    No documentation.           Christiane Arreola, VASU

## 2020-12-23 NOTE — PROGRESS NOTES
[facility] INPATIENT PROGRESS NOTE    Length of Stay:  2 days    CHIEF COMPLAINT: SVC and left brachiocephalic thrombosis in the setting of metastatic rectal cancer with Mediport in place      SUBJECTIVE: Overnight, the patient had developed increasing shortness of breath as well as increasing edema in the upper extremities.  We did proceed with CT angiogram of the chest that fortunately showed no evidence of pulmonary embolism and stable thrombosis in the SVC and brachiocephalic veins.  There was stable groundglass opacities in the bilateral upper lobes possibly representing infiltrate.  Since that time, the patient notes that she has improved significantly.  Her respiratory status has improved and she is no longer reporting shortness of breath.  Her upper extremity edema has now started to improve.  She has no new complaints.  She has no bleeding issues on anticoagulation.    ROS:  Review of Systems a comprehensive review of systems was obtained with pertinent positive findings as noted in the history present illness above.  All other systems negative.    OBJECTIVE:  Vitals:    12/22/20 2311 12/23/20 0516 12/23/20 0718 12/23/20 1321   BP: 116/73  110/72 127/73   BP Location: Left leg  Left leg Right leg   Patient Position: Lying  Lying Lying   Pulse: (!) 122  109 120   Resp: 20  20 20   Temp: 98.1 °F (36.7 °C)  98.4 °F (36.9 °C) 99.6 °F (37.6 °C)   TempSrc: Oral  Oral Oral   SpO2: 95%  94% 94%   Weight:  105 kg (230 lb 13.2 oz)     Height:             PHYSICAL EXAMINATION:  General: Alert and oriented x3 no distress  Chest/Lungs: Lungs clear to auscultation bilaterally  Heart: Tachycardic, regular rhythm  Abdomen/GI: Soft nontender nondistended bowel sounds present.  Ostomy in place.  Band-Aid overlying residual wound in the lower portion of the midline scar.  Extremities: Bilateral upper extremity edema has improved today.  There is no edema in the lower extremities bilaterally.    DIAGNOSTIC  DATA:  Results Review:     I reviewed the patient's new clinical results.    Results from last 7 days   Lab Units 12/23/20  0308   WBC 10*3/mm3 6.00   HEMOGLOBIN g/dL 11.8*   HEMATOCRIT % 35.3   PLATELETS 10*3/mm3 206     Lab Results   Component Value Date    NEUTROABS 4.10 12/23/2020     Results from last 7 days   Lab Units 12/23/20  0308   SODIUM mmol/L 130*   POTASSIUM mmol/L 3.5   CHLORIDE mmol/L 97*   CO2 mmol/L 24.0   BUN mg/dL 8   CREATININE mg/dL 0.60   GLUCOSE mg/dL 99   CALCIUM mg/dL 8.7     Results from last 7 days   Lab Units 12/23/20  1330 12/23/20  0308 12/22/20  1753  12/21/20  1038   INR   --   --   --   --  1.40*   APTT seconds 121.8* 56.1* 55.1*   < > 26.5    < > = values in this interval not displayed.           Reviewed CT angiogram chest as outlined below.    Assessment/Plan   ASSESSMENT/PLAN:  This is a 40 y.o. female with:     *Metastatic rectal adenocarcinoma  · Patient initially had clinical stage IIA ([id number]) disease and received neoadjuvant concurrent chemoradiation with continuous infusion 5-FU beginning 8/12/2019  · Surgical resection with diverting loop ileostomy 12/2/2019 with Dr. Ye.  Pathology with residual stage IIIB ([id number]) disease with 1 of 14 lymph nodes positive.  Foundation medicine CDX: JAYME, TMB 5muts/Mb, KRAS mutated  · Patient received adjuvant chemotherapy with FOLFOX x8 cycles completed 5/13/2020.  Poor tolerance to 5-FU with hand-foot syndrome.  · Low anterior resection with coloanal anastomosis, mobilization of splenic flexure on 8/3/2020 with pathology showing fat necrosis and an omental nodule, benign tissue at rectal anastomosis with serositis, no evidence of malignancy.  · Perirectal abscess requiring incision and drainage on 8/27/2020  · PET scan 9/18/2020 with bilateral pulmonary nodules increased in size with hypermetabolic activity likely representing metastatic disease, loculated fluid collection around the presacral soft tissues with air consistent with  rectal leak, possible rectovaginal fistula, ill-defined soft tissue thickening throughout the abdomen and pelvis likely corresponding to partially loculated fluid collection and felt to probably be reactive but could not rule out peritoneal spread of malignancy.  · Initiated palliative FOLFIRI chemotherapy 10/13/2020  · Notation on CT angiogram chest 11/16/2020 of left lower lobe nodule 5 mm slightly decreased from prior imaging and right upper lobe nodule with decrease in size, no new nodules identified.  · Last received FOLFIRI on 12/8/2020, cycle 5.  Scheduled for cycle 6 on 12/29/2020.  Plans for PET scan following cycle 6.     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  Low-dose Lovenox discontinued and initiated Xarelto 20 mg daily.  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  · Patient held Xarelto for 2 days prior to MediPort removal from the left chest wall and placement of new port in the right chest wall on 12/18/2020.  She resumed Xarelto that evening.  · Progressive swelling in the neck, face, upper extremities prompting current hospitalization with CT scan showing thrombosis in the right subclavian vein and SVC.  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  Although she had been off of Xarelto for a few days, clearly Xarelto was not prohibiting progression of her thrombosis after she resumed treatment and there was some  evidence to suggest thrombosis had been present at least in the innominate vein on prior scan in September but now appears chronic.  Current acute thrombosis involving SVC and right subclavian.  · Patient was admitted and placed on heparin drip  · Patient was evaluated by vascular surgery and intervention was not recommended for thrombolysis/thrombectomy.  · On 12/22/2020, the patient developed worsening shortness of breath, increasing upper extremity edema consistent with worsening SVC syndrome.  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  · Today, the patient is much improved.  Her shortness of breath has resolved.  Upper extremity edema has improved as well.  We reviewed her CT angiogram showing stable thrombosis and no evidence of pulmonary embolism.  We discussed further management of her anticoagulation.  I discussed this as well with Dr. Nguyen.  She had previously received prophylactic dose Lovenox when she developed pulmonary embolism in September 2019.  She was not on full dose Lovenox at that time.  Full dose Lovenox would appear to be the most appropriate treatment for her at this point as an outpatient and Dr. Nguyen was in agreement.  We have verified that the patient will have no co-pay for Lovenox and will plan to transition possibly tomorrow to Lovenox 100 mg every 12 hours.  If she improves further tomorrow we may consider possible discharge home as well.     *Abdominal abscess/wound  · The patient has a slowly healing wound in the midportion of her abdominal incision.  There is only a Band-Aid present currently with no drainage.    *Pulmonary infiltrates  · Patient with groundglass opacities on CT in the upper lobes which was stable on follow-up CT angiogram chest 12/23/2020.  · Patient had developed worsening shortness of breath however had remained afebrile, no cough.  She was  negative for COVID-19 x2.  Procalcitonin normal at 0.15 today.  Patient reports shortness of breath has improved significantly.  CT angiogram 12/23/2020 was negative for pulmonary embolism.  O2 saturation in the mid 90% range room air currently.        PLAN:   1. Continue heparin drip for now  2. If the patient has shown further improvement in a.m., will transition from heparin drip to Lovenox 100 mg every 12 hours and consider possible discharge home.  3. Daily CBC, PTT    Discussed with patient at bedside.  Discussed with Dr. Nguyen.         Pablo Raygoza MD

## 2020-12-23 NOTE — PLAN OF CARE
Goal Outcome Evaluation:  Plan of Care Reviewed With: patient  Progress: no change  Outcome Summary: Hep gtt titrated to 20.7 u/kg/hr, increase in cornelia upper ext, and patient c/o of increase in SOA, CTA of chest and repeat ptt, pain medications adjusted, upper ext elevated, Vascular paged about using port, waiting response, heartrate improved to low 100's, BP's stable, on 3 L O2, will CTM

## 2020-12-23 NOTE — PLAN OF CARE
Goal Outcome Evaluation:  Plan of Care Reviewed With: patient  Progress: improving  Outcome Summary: Patient overall states she feels a bit better. Heparin gtt currently stopped because of a PTT greater than 500. PTT was ordered for 10am but was not drawn until closer to 11, then resulted hours later elevated due to blood obtained from the same arm the heparin was infusing through. Redraw was obtained and resulted elevated. Will restart at 1608 and decrease by 3 per MAR. Possible d/c tomorrow if pt is transitioned to lovenox from the heparin gtt. did c/o pain this shift, PRN medication given per MAR. No other complaints at this time. Will CTM the rest of my shift.

## 2020-12-24 VITALS
BODY MASS INDEX: 38.79 KG/M2 | RESPIRATION RATE: 20 BRPM | HEART RATE: 117 BPM | DIASTOLIC BLOOD PRESSURE: 79 MMHG | OXYGEN SATURATION: 93 % | SYSTOLIC BLOOD PRESSURE: 133 MMHG | HEIGHT: 65 IN | WEIGHT: 232.81 LBS | TEMPERATURE: 98.3 F

## 2020-12-24 LAB
ANION GAP SERPL CALCULATED.3IONS-SCNC: 10.9 MMOL/L (ref 5–15)
APTT PPP: 105.3 SECONDS (ref 22.7–35.4)
APTT PPP: 72.2 SECONDS (ref 22.7–35.4)
BASOPHILS # BLD AUTO: 0.02 10*3/MM3 (ref 0–0.2)
BASOPHILS NFR BLD AUTO: 0.5 % (ref 0–1.5)
BUN SERPL-MCNC: 8 MG/DL (ref 6–20)
BUN/CREAT SERPL: 14 (ref 7–25)
CALCIUM SPEC-SCNC: 8.7 MG/DL (ref 8.6–10.5)
CHLORIDE SERPL-SCNC: 100 MMOL/L (ref 98–107)
CO2 SERPL-SCNC: 25.1 MMOL/L (ref 22–29)
CREAT SERPL-MCNC: 0.57 MG/DL (ref 0.57–1)
DEPRECATED RDW RBC AUTO: 58.2 FL (ref 37–54)
EOSINOPHIL # BLD AUTO: 0.21 10*3/MM3 (ref 0–0.4)
EOSINOPHIL NFR BLD AUTO: 5.4 % (ref 0.3–6.2)
ERYTHROCYTE [DISTWIDTH] IN BLOOD BY AUTOMATED COUNT: 17.5 % (ref 12.3–15.4)
GFR SERPL CREATININE-BSD FRML MDRD: 117 ML/MIN/1.73
GLUCOSE SERPL-MCNC: 93 MG/DL (ref 65–99)
HCT VFR BLD AUTO: 34.3 % (ref 34–46.6)
HGB BLD-MCNC: 11.5 G/DL (ref 12–15.9)
IMM GRANULOCYTES # BLD AUTO: 0.03 10*3/MM3 (ref 0–0.05)
IMM GRANULOCYTES NFR BLD AUTO: 0.8 % (ref 0–0.5)
LYMPHOCYTES # BLD AUTO: 0.58 10*3/MM3 (ref 0.7–3.1)
LYMPHOCYTES NFR BLD AUTO: 14.9 % (ref 19.6–45.3)
MCH RBC QN AUTO: 31.6 PG (ref 26.6–33)
MCHC RBC AUTO-ENTMCNC: 33.5 G/DL (ref 31.5–35.7)
MCV RBC AUTO: 94.2 FL (ref 79–97)
MONOCYTES # BLD AUTO: 0.53 10*3/MM3 (ref 0.1–0.9)
MONOCYTES NFR BLD AUTO: 13.7 % (ref 5–12)
NEUTROPHILS NFR BLD AUTO: 2.51 10*3/MM3 (ref 1.7–7)
NEUTROPHILS NFR BLD AUTO: 64.7 % (ref 42.7–76)
NRBC BLD AUTO-RTO: 0 /100 WBC (ref 0–0.2)
PLATELET # BLD AUTO: 228 10*3/MM3 (ref 140–450)
PMV BLD AUTO: 8.7 FL (ref 6–12)
POTASSIUM SERPL-SCNC: 3.6 MMOL/L (ref 3.5–5.2)
RBC # BLD AUTO: 3.64 10*6/MM3 (ref 3.77–5.28)
SODIUM SERPL-SCNC: 136 MMOL/L (ref 136–145)
WBC # BLD AUTO: 3.88 10*3/MM3 (ref 3.4–10.8)

## 2020-12-24 PROCEDURE — 80048 BASIC METABOLIC PNL TOTAL CA: CPT | Performed by: NURSE PRACTITIONER

## 2020-12-24 PROCEDURE — 25010000002 HEPARIN (PORCINE) PER 1000 UNITS: Performed by: NURSE PRACTITIONER

## 2020-12-24 PROCEDURE — 85025 COMPLETE CBC W/AUTO DIFF WBC: CPT | Performed by: NURSE PRACTITIONER

## 2020-12-24 PROCEDURE — 85730 THROMBOPLASTIN TIME PARTIAL: CPT | Performed by: INTERNAL MEDICINE

## 2020-12-24 PROCEDURE — 25010000002 ENOXAPARIN PER 10 MG: Performed by: INTERNAL MEDICINE

## 2020-12-24 PROCEDURE — 99232 SBSQ HOSP IP/OBS MODERATE 35: CPT | Performed by: INTERNAL MEDICINE

## 2020-12-24 PROCEDURE — 85730 THROMBOPLASTIN TIME PARTIAL: CPT | Performed by: NURSE PRACTITIONER

## 2020-12-24 RX ADMIN — ENOXAPARIN SODIUM 110 MG: 120 INJECTION SUBCUTANEOUS at 14:57

## 2020-12-24 RX ADMIN — HYDROCODONE BITARTRATE AND ACETAMINOPHEN 1 TABLET: 7.5; 325 TABLET ORAL at 18:46

## 2020-12-24 RX ADMIN — CLONAZEPAM 1 MG: 1 TABLET ORAL at 18:46

## 2020-12-24 RX ADMIN — DICLOFENAC SODIUM 4 G: 10 GEL TOPICAL at 08:37

## 2020-12-24 RX ADMIN — HEPARIN SODIUM 17.7 UNITS/KG/HR: 10000 INJECTION, SOLUTION INTRAVENOUS at 06:12

## 2020-12-24 RX ADMIN — CETIRIZINE HYDROCHLORIDE 10 MG: 10 TABLET ORAL at 08:36

## 2020-12-24 RX ADMIN — SODIUM CHLORIDE, PRESERVATIVE FREE 10 ML: 5 INJECTION INTRAVENOUS at 08:36

## 2020-12-24 RX ADMIN — FAMOTIDINE 20 MG: 20 TABLET, FILM COATED ORAL at 08:36

## 2020-12-24 NOTE — PROGRESS NOTES
[facility] INPATIENT PROGRESS NOTE    Length of Stay:  3 days    CHIEF COMPLAINT: SVC and left brachiocephalic thrombosis in the setting of metastatic rectal cancer with Mediport in place      SUBJECTIVE: Patient reports she is doing quite well this morning.  She is not experiencing any further symptoms of dyspnea.  She has remained off of oxygen maintaining O2 saturation in the mid 90% range.  She notes that the upper extremity swelling has been stable to slightly improved this morning and her facial swelling has proved further.  She has been ambulating around the room without difficulty.  No bleeding issues.    ROS:  Review of Systems comprehensive review of systems was obtained with pertinent positive findings as noted in the interval history.  All other systems negative.    OBJECTIVE:  Vitals:    12/23/20 1842 12/23/20 2350 12/24/20 0500 12/24/20 0715   BP: 145/84 131/75  141/88   BP Location: Right leg Left leg  Right leg   Patient Position: Lying Lying  Lying   Pulse: (!) 121 106  113   Resp: 20 20 20   Temp: 100.4 °F (38 °C) 98.8 °F (37.1 °C)  98.9 °F (37.2 °C)   TempSrc: Oral Oral  Oral   SpO2: 95% 91%  90%   Weight:   106 kg (232 lb 12.9 oz)    Height:             PHYSICAL EXAMINATION:  General: Alert and oriented x3 no distress  Chest/Lungs: Lungs clear to auscultation bilaterally  Heart: Tachycardic, regular rhythm  Abdomen/GI: Soft nontender nondistended bowel sounds present.  Ostomy in place.  Band-Aid overlying residual wound in the lower portion of the midline scar.  Extremities: Bilateral upper extremity edema appears stable to slightly improved.  There is no edema in the lower extremities bilaterally.    DIAGNOSTIC DATA:  Results Review:     I reviewed the patient's new clinical results.    Results from last 7 days   Lab Units 12/24/20  0532   WBC 10*3/mm3 3.88   HEMOGLOBIN g/dL 11.5*   HEMATOCRIT % 34.3   PLATELETS 10*3/mm3 228     Lab Results   Component Value Date    NEUTROABS 2.51  12/24/2020     Results from last 7 days   Lab Units 12/24/20  0532   SODIUM mmol/L 136   POTASSIUM mmol/L 3.6   CHLORIDE mmol/L 100   CO2 mmol/L 25.1   BUN mg/dL 8   CREATININE mg/dL 0.57   GLUCOSE mg/dL 93   CALCIUM mg/dL 8.7     Results from last 7 days   Lab Units 12/24/20  1206 12/24/20  0532 12/23/20  2051  12/21/20  1038   INR   --   --   --   --  1.40*   APTT seconds 72.2* 105.3* 82.3*   < > 26.5    < > = values in this interval not displayed.               Assessment/Plan   ASSESSMENT/PLAN:  This is a 40 y.o. female with:     *Metastatic rectal adenocarcinoma  · Patient initially had clinical stage IIA ([id number]) disease and received neoadjuvant concurrent chemoradiation with continuous infusion 5-FU beginning 8/12/2019  · Surgical resection with diverting loop ileostomy 12/2/2019 with Dr. Ye.  Pathology with residual stage IIIB ([id number]) disease with 1 of 14 lymph nodes positive.  Foundation medicine CDX: JAYME, TMB 5muts/Mb, KRAS mutated  · Patient received adjuvant chemotherapy with FOLFOX x8 cycles completed 5/13/2020.  Poor tolerance to 5-FU with hand-foot syndrome.  · Low anterior resection with coloanal anastomosis, mobilization of splenic flexure on 8/3/2020 with pathology showing fat necrosis and an omental nodule, benign tissue at rectal anastomosis with serositis, no evidence of malignancy.  · Perirectal abscess requiring incision and drainage on 8/27/2020  · PET scan 9/18/2020 with bilateral pulmonary nodules increased in size with hypermetabolic activity likely representing metastatic disease, loculated fluid collection around the presacral soft tissues with air consistent with rectal leak, possible rectovaginal fistula, ill-defined soft tissue thickening throughout the abdomen and pelvis likely corresponding to partially loculated fluid collection and felt to probably be reactive but could not rule out peritoneal spread of malignancy.  · Initiated palliative FOLFIRI chemotherapy  10/13/2020  · Notation on CT angiogram chest 11/16/2020 of left lower lobe nodule 5 mm slightly decreased from prior imaging and right upper lobe nodule with decrease in size, no new nodules identified.  · Last received FOLFIRI on 12/8/2020, cycle 5.  Scheduled for cycle 6 on 12/29/2020.  Plans for PET scan following cycle 6.     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  Low-dose Lovenox discontinued and initiated Xarelto 20 mg daily.  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  · Patient held Xarelto for 2 days prior to MediPort removal from the left chest wall and placement of new port in the right chest wall on 12/18/2020.  She resumed Xarelto that evening.  · Progressive swelling in the neck, face, upper extremities prompting current hospitalization with CT scan showing thrombosis in the right subclavian vein and SVC.  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  Although she had been off of Xarelto for a few days, clearly Xarelto was not prohibiting progression of her thrombosis after she resumed treatment and there was some evidence to suggest thrombosis had been present at least in the innominate vein on prior scan in September but now appears chronic.  Current acute thrombosis involving SVC and right subclavian.  · Patient was admitted and placed on heparin drip  · Patient was evaluated by vascular surgery and intervention was  not recommended for thrombolysis/thrombectomy.  · On 12/22/2020, the patient developed worsening shortness of breath, increasing upper extremity edema consistent with worsening SVC syndrome.  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  · Patient today has stable to improved symptoms related to her SVC syndrome.  At this point we will go ahead and transition from heparin drip to Lovenox 1 mg/kg (110 mg) every 12 hours.  As no intervention is planned by vascular surgery and patient is otherwise doing well, she is likely okay for discharge from our standpoint.  Symptoms from her SVC syndrome will likely take quite some time to gradually improve.     *Abdominal abscess/wound  · The patient has a slowly healing wound in the midportion of her abdominal incision.  There is only a Band-Aid present currently with no drainage.    *Pulmonary infiltrates  · Patient with groundglass opacities on CT in the upper lobes which was stable on follow-up CT angiogram chest 12/23/2020.  · Patient had developed worsening shortness of breath however had remained afebrile, no cough.  She was negative for COVID-19 x2.  Procalcitonin normal at 0.15 today.  Patient reports shortness of breath has improved significantly.  CT angiogram 12/23/2020 was negative for pulmonary embolism.    · O2 saturation in the mid 90% range room air currently.        PLAN:   1. Discontinue heparin drip  2. 1 hour after discontinuation of heparin begin Lovenox 1 mg/kg (110 mg) every 12 hours.  Anticipate ongoing anticoagulation with Lovenox as outpatient indefinitely.  3. Patient okay for discharge from heme/op standpoint.  Home Lovenox has already been arranged with 0 co-pay and patient is able to self administer injections.  Patient has follow-up scheduled with Dr. Nguyen on 12/29/2020 and will decide whether to proceed at that time with cycle 6  FOLFIRI on schedule.    Discussed with patient at bedside.          Pablo Raygoza MD

## 2020-12-24 NOTE — PLAN OF CARE
Goal Outcome Evaluation:  Plan of Care Reviewed With: patient  Progress: improving  Outcome Summary: Pt AO, VSS she is no longer on the heparin drip she is taking Lovenox started today, this is Q12hrs. She is extremely anxious and sad because she was not discharged home today. We will CTM

## 2020-12-25 ENCOUNTER — READMISSION MANAGEMENT (OUTPATIENT)
Dept: CALL CENTER | Facility: HOSPITAL | Age: 40
End: 2020-12-25

## 2020-12-25 NOTE — PROGRESS NOTES
Name: Carole Weaver ADMIT: 2020   : 1980  PCP: Deepika Vela III, MICHAEL-C    MRN: [id number] LOS: 3 days   AGE/SEX: 40 y.o. female  ROOM: [facility]     Subjective   Subjective     Patient is seen at bedside, no new complaints.       Objective   Objective   Vital Signs  Temp:  [98.3 °F (36.8 °C)-98.9 °F (37.2 °C)] 98.3 °F (36.8 °C)  Heart Rate:  [106-117] 117  Resp:  [20] 20  BP: (131-149)/(70-88) 133/79  SpO2:  [90 %-93 %] 93 %  on  Flow (L/min):  [2] 2;   Device (Oxygen Therapy): room air  Body mass index is 38.32 kg/m².  Physical Exam   General: patient is awake and alert. Appears sick on chronic basis    Neck, swelling has improved.  Head and ENT: normocephalic and atraumatic.  Facial swelling has been improving.  Lungs: symmetric expansion and equal air entry bilaterally.  Heart: regular rate, rhythm, no murmurs.  Abdomen: soft, nontender, bowel sounds present.  Extremities:  No clubbing, cyanosis or edema. Bilateral upper extremity swelling.  Neurologic:  No focal neurological deficits present.  Cranial nerves intact.  Psychiatry:  Normal mood and affect.  Skin:  Warm and no rash.    Results Review     I reviewed the patient's new clinical results.  Results from last 7 days   Lab Units 20  0532 20  0308 20  04520  1123   WBC 10*3/mm3 3.88 6.00 7.12 10.29   HEMOGLOBIN g/dL 11.5* 11.8* 12.0 12.8   PLATELETS 10*3/mm3 228 206 254 195     Results from last 7 days   Lab Units 20  0532 20  0308 20  0456 20  1029   SODIUM mmol/L 136 130* 134* 134*   POTASSIUM mmol/L 3.6 3.5 3.8 4.2   CHLORIDE mmol/L 100 97* 100 99   CO2 mmol/L 25.1 24.0 22.5 21.0*   BUN mg/dL 8 8 10 9   CREATININE mg/dL 0.57 0.60 0.77 0.85   GLUCOSE mg/dL 93 99 115* 129*   Estimated Creatinine Clearance: 158.1 mL/min (by C-G formula based on SCr of 0.57 mg/dL).  Results from last 7 days   Lab Units 20  1029   ALBUMIN g/dL 3.50   BILIRUBIN mg/dL 0.8   ALK PHOS U/L 115   AST  (SGOT) U/L 28   ALT (SGPT) U/L 29     Results from last 7 days   Lab Units 12/24/20  0532 12/23/20  0308 12/22/20  0456 12/21/20  1029   CALCIUM mg/dL 8.7 8.7 8.6 9.2   ALBUMIN g/dL  --   --   --  3.50     Results from last 7 days   Lab Units 12/23/20  0308 12/21/20  1052   PROCALCITONIN ng/mL 0.15  --    LACTATE mmol/L  --  1.5     COVID19   Date Value Ref Range Status   12/22/2020 Not Detected Not Detected - Ref. Range Final   12/21/2020 Not Detected Not Detected - Ref. Range Final     No results found for: HGBA1C, POCGLU    CT Angiogram Chest With & Without Contrast  Narrative: CT PULMONARY ANGIOGRAM:     HISTORY:  PE suspected, high probability.     TECHNIQUE:  Non contrast localizing images were performed through the  mediastinum for localization and bolus timing. Axial images were  obtained from the apex to the lung bases and during IV contrast  infusion. No adverse reaction. Multiplanar reformatted images were  reconstructed from the helical source data. Radiation dose reduction  techniques were utilized, including automated exposure control and  exposure modulation based on body size.         COMPARISON:  CT chest 12/21/2020 and CTA chest 11/16/2020.     FINDINGS:       Main pulmonary artery is noted enlarged. No pulmonary embolus is seen  within the main, left, right, and lobar branches. The segmental and  subsegmental branches cannot be clearly evaluated due to contrast bolus  timing.     The thoracic aorta is normal in caliber. No evidence of thoracic aortic  dissection.      Again seen are multiple left upper chest wall collaterals and thrombosis  of the SVC and brachiocephalic vein .     The heart is normal in size. There is a stable trace pericardial  effusion.     Stable right hilar soft tissue prominence. No mediastinal or left hilar  lymphadenopathy.  No axillary lymphadenopathy.     The central airways are patent. Stable elevated right hemidiaphragm.  Stable small right pleural effusion with  associated compressive  atelectasis. Stable 7 mm right upper lobe lung nodule. Stable left  posterior lung base scarring or atelectasis. Stable peripheral patchy  groundglass opacity in the bilateral upper lobes.     No acute osseous abnormality. No aggressive osseous lesions.     Hepatic steatosis.     Impression: 1.  No evidence of pulmonary embolism within the main, right, left and  lobar branches of the pulmonary arterial vasculature. The segmental and  subsegmental branches cannot be clearly evaluated due to contrast bolus  timing.  2.  Stable peripheral groundglass opacities at the bilateral upper lobes  that may represent pneumonia.  3.  Stable small right pleural effusion and bibasilar atelectasis.  4.  Stable thrombosis of the SVC and brachiocephalic vein.   5.  Other stable findings, as above.     Findings were discussed with the patient's nurse at 2:00 AM on  12/20/2020.     This report was finalized on 12/23/2020 2:11 AM by Dr. Kevan Rae M.D.       Scheduled Medications  cetirizine, 10 mg, Oral, Daily  Diclofenac Sodium, 4 g, Topical, TID  enoxaparin, 1 mg/kg, Subcutaneous, Q12H  famotidine, 20 mg, Oral, BID  mirtazapine, 15 mg, Oral, Nightly  sodium chloride, 10 mL, Intravenous, Q12H    Infusions   Diet  Diet Regular       Assessment/Plan     Active Hospital Problems    Diagnosis  POA   • **Thrombosis of superior vena cava (CMS/HCC) [I82.210]  Yes   • Hyponatremia [E87.1]  Unknown   • Tachycardia [R00.0]  Unknown   • Secondary cancer of lung (CMS/HCC) [C78.00]  Yes   • Ileostomy present (CMS/HCC) [Z93.2]  Not Applicable   • HTN (hypertension) [I10]  Yes   • Chronic anticoagulation [Z79.01]  Not Applicable   • Chemotherapy-induced thrombocytopenia [D69.59, T45.1X5A]  Yes   • On antineoplastic chemotherapy [Z79.899]  Not Applicable   • Other pulmonary embolism without acute cor pulmonale (CMS/HCC) [I26.99]  Yes   • Heterozygous factor V Leiden mutation (CMS/HCC) [D68.51]  Yes   • Adenocarcinoma of  rectum (CMS/HCC) [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       40 y.o. female admitted with Thrombosis of superior vena cava (CMS/HCC).      Assessment plan:   40-year-old female, presented to the hospital with thrombosis of superior vena cava, she was started on heparin drip.  Patient had been seen by hematology/oncology service.  Patient is high risk for clotting because of hypercoagulable state with factor 5 mutation as well as cancer.  Monitoring was done closely.  Patient had clinically improved and responded well to IV heparin treatment.  She has been transitioned to Lovenox treatment today.  If she continues to respond well to the treatment, she will be discharged home soon.  Patient also has a ileostomy present.  She also has history of PE and factor 5 Leiden.  Patient has been monitored for clinical safety closely.      Greg rAteaga MD  [facility]  12/24/20  20:41 EST

## 2020-12-25 NOTE — NURSING NOTE
Call placed to vascular. . Per phone call he is ok for patient to be discharged.  Call placed to [facility] to get DC orders.

## 2020-12-25 NOTE — OUTREACH NOTE
Prep Survey      Responses   [facility] patient discharged from?  [place]   Is LACE score < 7 ?  No   Emergency Room discharge w/ pulse ox?  No   Eligibility  [facility]   Date of Admission  12/21/20   Date of Discharge  12/24/20   Discharge Disposition  Home or Self Care   Discharge diagnosis  Thrombosis of superior vena cava    Does the patient have one of the following disease processes/diagnoses(primary or secondary)?  General Surgery   Does the patient have Home health ordered?  No   Is there a DME ordered?  No   Medication alerts for this patient  lovenox   Prep survey completed?  Yes          Carla Simon RN

## 2020-12-25 NOTE — DISCHARGE SUMMARY
[facility]    Date of Discharge:  12/25/2020    PCP: Deepika Vela III, NP-C    Discharge Diagnosis:   Active Hospital Problems    Diagnosis  POA   • **Thrombosis of superior vena cava (CMS/HCC) [I82.210]  Yes   • Hyponatremia [E87.1]  Unknown   • Tachycardia [R00.0]  Unknown   • Secondary cancer of lung (CMS/HCC) [C78.00]  Yes   • Ileostomy present (CMS/HCC) [Z93.2]  Not Applicable   • HTN (hypertension) [I10]  Yes   • Chronic anticoagulation [Z79.01]  Not Applicable   • Chemotherapy-induced thrombocytopenia [D69.59, T45.1X5A]  Yes   • On antineoplastic chemotherapy [Z79.899]  Not Applicable   • Other pulmonary embolism without acute cor pulmonale (CMS/HCC) [I26.99]  Yes   • Heterozygous factor V Leiden mutation (CMS/HCC) [D68.51]  Yes   • Adenocarcinoma of rectum (CMS/HCC) [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          Consults     Date and Time Order Name Status Description    12/21/2020 1928 Inpatient Vascular Surgery Consult Completed     12/21/2020 1928 Inpatient Hematology & Oncology Consult Completed     12/21/2020 1543 [facility] (on-call MD unless specified) Details Completed     12/21/2020 1405 Inpatient Vascular Surgery Consult Completed         Hospital Course   40-year-old female, presented to the hospital with thrombosis of superior vena cava, she was started on heparin drip.  Patient had been seen by hematology/oncology service.  Patient is high risk for clotting because of hypercoagulable state with factor 5 mutation as well as cancer.  Monitoring was done closely.  Patient had clinically improved and responded well to IV heparin treatment.  She has been transitioned to Lovenox treatment today.  If she continues to respond well to the treatment, she will be discharged home soon.  Patient also has a ileostomy present.  She also has history of PE and factor 5 Leiden.  Patient has been monitored for  clinical safety closely.  Time spent on discharge day management was more than 30 minutes.    Condition on Discharge: Improved.     Temp:  [98.3 °F (36.8 °C)-98.4 °F (36.9 °C)] 98.3 °F (36.8 °C)  Heart Rate:  [117] 117  Resp:  [20] 20  BP: (133-149)/(70-79) 133/79  Body mass index is 38.32 kg/m².    Physical Exam   General: patient is awake and alert. Appears sick on chronic basis    Neck, swelling has improved.  Head and ENT: normocephalic and atraumatic.  Facial swelling has been improving.  Lungs: symmetric expansion and equal air entry bilaterally.  Heart: regular rate, rhythm, no murmurs.  Abdomen: soft, nontender, bowel sounds present.  Extremities:  No clubbing, cyanosis or edema. Bilateral upper extremity swelling.  Neurologic:  No focal neurological deficits present.  Cranial nerves intact.  Psychiatry:  Normal mood and affect.  Skin:  Warm and no rash.       Disposition: Home or Self Care       Discharge Medications      New Medications      Instructions Start Date   enoxaparin 100 MG/ML solution syringe  Commonly known as: Lovenox   100 mg, Subcutaneous, Every 12 Hours Scheduled         Changes to Medications      Instructions Start Date   HYDROcodone-acetaminophen 7.5-325 MG per tablet  Commonly known as: NORCO  What changed: Another medication with the same name was removed. Continue taking this medication, and follow the directions you see here.   1 tablet, Oral, Every 6 Hours PRN         Continue These Medications      Instructions Start Date   Claritin 10 MG capsule  Generic drug: Loratadine   10 mg, Oral, Every Evening      clonazePAM 1 MG tablet  Commonly known as: KlonoPIN   1 mg, Oral, 3 Times Daily PRN      Diclofenac Sodium 1 % gel gel  Commonly known as: VOLTAREN   4 g, Topical, 3 Times Daily      dicyclomine 20 MG tablet  Commonly known as: BENTYL   TAKE 1 TABLET BY MOUTH EVERY 6 HOURS AS NEEDED      diphenoxylate-atropine 2.5-0.025 MG per tablet  Commonly known as: LOMOTIL   1 tablet, Oral,  4 Times Daily PRN      famotidine 20 MG tablet  Commonly known as: PEPCID   TAKE 1 TABLET BY MOUTH TWICE A DAY      mirtazapine 15 MG disintegrating tablet  Commonly known as: REMERON SOL-TAB   15 mg, Translingual, Nightly      ondansetron 8 MG tablet  Commonly known as: ZOFRAN   8 mg, Oral, 3 Times Daily PRN      ondansetron 4 MG tablet  Commonly known as: Zofran   4 mg, Oral, Every 6 Hours PRN      prochlorperazine 10 MG tablet  Commonly known as: COMPAZINE   10 mg, Oral, Every 6 Hours PRN         Stop These Medications    rivaroxaban 20 MG tablet  Commonly known as: Xarelto             Additional Instructions for the Follow-ups that You Need to Schedule     Discharge Follow-up with Specified Provider: Dr. Nguyen ([facility])   As directed      To: Dr. Nguyen ([facility])    Follow Up Details: 12/29/20           Follow-up Information     Deepika Vela III NP-C .    Specialty: Family Medicine  Contact information:  [address] [place] [address]  [phone]                     Greg Arteaga MD  12/25/20  10:05 EST    Discharge time spent greater than 30 minutes.

## 2020-12-28 ENCOUNTER — TRANSITIONAL CARE MANAGEMENT TELEPHONE ENCOUNTER (OUTPATIENT)
Dept: CALL CENTER | Facility: HOSPITAL | Age: 40
End: 2020-12-28

## 2020-12-28 NOTE — OUTREACH NOTE
Call Center TCM Note      Responses   [facility] patient discharged from?  [place]   Does the patient have one of the following disease processes/diagnoses(primary or secondary)?  General Surgery   TCM attempt successful?  Yes   Discharge diagnosis  Thrombosis of superior vena cava    Medication alerts for this patient  lovenox   Meds reviewed with patient/caregiver?  Yes   Is the patient having any side effects they believe may be caused by any medication additions or changes?  No   Does the patient have all medications related to this admission filled (includes all antibiotics, pain medications, etc.)  Yes   Is the patient taking all medications as directed (includes completed medication regime)?  Yes   Does the patient have a follow up appointment scheduled with their surgeon?  Yes   Has the patient kept scheduled appointments due by today?  Yes   Comments  Pt is seeing Oncologist tomorrow for fwp   Has home health visited the patient within 72 hours of discharge?  N/A   Psychosocial issues?  No   Did the patient receive a copy of their discharge instructions?  Yes   Nursing interventions  Reviewed instructions with patient   What is the patient's perception of their health status since discharge?  Improving   TCM call completed?  Yes   Wrap up additional comments  Pt doing better, right side face neck and arm swelling much improved. Pt has Lovenox in place. and will see Oncologist tomorrow. No surgery this stay but pt had just had her port moved from left to right side whicch was partiial cause of new issues. PCP has no openings for TCM FWP if ofc can review sched and call pt.           Sandrita Irving MA    12/28/2020, 14:11 EST

## 2020-12-29 ENCOUNTER — OFFICE VISIT (OUTPATIENT)
Dept: ONCOLOGY | Facility: CLINIC | Age: 40
End: 2020-12-29

## 2020-12-29 ENCOUNTER — INFUSION (OUTPATIENT)
Dept: ONCOLOGY | Facility: HOSPITAL | Age: 40
End: 2020-12-29

## 2020-12-29 VITALS
OXYGEN SATURATION: 96 % | BODY MASS INDEX: 37.3 KG/M2 | DIASTOLIC BLOOD PRESSURE: 78 MMHG | TEMPERATURE: 97.8 F | WEIGHT: 223.9 LBS | SYSTOLIC BLOOD PRESSURE: 113 MMHG | HEIGHT: 65 IN | RESPIRATION RATE: 16 BRPM | HEART RATE: 123 BPM

## 2020-12-29 DIAGNOSIS — D68.51 HETEROZYGOUS FACTOR V LEIDEN MUTATION (HCC): ICD-10-CM

## 2020-12-29 DIAGNOSIS — R53.1 WEAKNESS: ICD-10-CM

## 2020-12-29 DIAGNOSIS — Z79.01 CHRONIC ANTICOAGULATION: ICD-10-CM

## 2020-12-29 DIAGNOSIS — Z45.2 ENCOUNTER FOR FITTING AND ADJUSTMENT OF VASCULAR CATHETER: Primary | ICD-10-CM

## 2020-12-29 DIAGNOSIS — I82.210 THROMBOSIS OF SUPERIOR VENA CAVA (HCC): ICD-10-CM

## 2020-12-29 DIAGNOSIS — C20 ADENOCARCINOMA OF RECTUM (HCC): Primary | ICD-10-CM

## 2020-12-29 LAB
ALBUMIN SERPL-MCNC: 3.3 G/DL (ref 3.5–5.2)
ALBUMIN/GLOB SERPL: 0.9 G/DL (ref 1.1–2.4)
ALP SERPL-CCNC: 95 U/L (ref 38–116)
ALT SERPL W P-5'-P-CCNC: 28 U/L (ref 0–33)
ANION GAP SERPL CALCULATED.3IONS-SCNC: 12 MMOL/L (ref 5–15)
AST SERPL-CCNC: 20 U/L (ref 0–32)
BASOPHILS # BLD AUTO: 0.02 10*3/MM3 (ref 0–0.2)
BASOPHILS NFR BLD AUTO: 0.4 % (ref 0–1.5)
BILIRUB SERPL-MCNC: 0.2 MG/DL (ref 0.2–1.2)
BUN SERPL-MCNC: 8 MG/DL (ref 6–20)
BUN/CREAT SERPL: 10.7 (ref 7.3–30)
CALCIUM SPEC-SCNC: 8.9 MG/DL (ref 8.5–10.2)
CHLORIDE SERPL-SCNC: 105 MMOL/L (ref 98–107)
CO2 SERPL-SCNC: 23 MMOL/L (ref 22–29)
CREAT SERPL-MCNC: 0.75 MG/DL (ref 0.6–1.1)
DEPRECATED RDW RBC AUTO: 65.5 FL (ref 37–54)
EOSINOPHIL # BLD AUTO: 0.19 10*3/MM3 (ref 0–0.4)
EOSINOPHIL NFR BLD AUTO: 3.5 % (ref 0.3–6.2)
ERYTHROCYTE [DISTWIDTH] IN BLOOD BY AUTOMATED COUNT: 18.1 % (ref 12.3–15.4)
GFR SERPL CREATININE-BSD FRML MDRD: 85 ML/MIN/1.73
GLOBULIN UR ELPH-MCNC: 3.5 GM/DL (ref 1.8–3.5)
GLUCOSE SERPL-MCNC: 117 MG/DL (ref 74–124)
HCT VFR BLD AUTO: 37.8 % (ref 34–46.6)
HGB BLD-MCNC: 12.2 G/DL (ref 12–15.9)
IMM GRANULOCYTES # BLD AUTO: 0.07 10*3/MM3 (ref 0–0.05)
IMM GRANULOCYTES NFR BLD AUTO: 1.3 % (ref 0–0.5)
LYMPHOCYTES # BLD AUTO: 0.92 10*3/MM3 (ref 0.7–3.1)
LYMPHOCYTES NFR BLD AUTO: 17.1 % (ref 19.6–45.3)
MCH RBC QN AUTO: 31.7 PG (ref 26.6–33)
MCHC RBC AUTO-ENTMCNC: 32.3 G/DL (ref 31.5–35.7)
MCV RBC AUTO: 98.2 FL (ref 79–97)
MONOCYTES # BLD AUTO: 0.43 10*3/MM3 (ref 0.1–0.9)
MONOCYTES NFR BLD AUTO: 8 % (ref 5–12)
NEUTROPHILS NFR BLD AUTO: 3.76 10*3/MM3 (ref 1.7–7)
NEUTROPHILS NFR BLD AUTO: 69.7 % (ref 42.7–76)
NRBC BLD AUTO-RTO: 0 /100 WBC (ref 0–0.2)
PLATELET # BLD AUTO: 327 10*3/MM3 (ref 140–450)
PMV BLD AUTO: 8.5 FL (ref 6–12)
POTASSIUM SERPL-SCNC: 4 MMOL/L (ref 3.5–4.7)
PROT SERPL-MCNC: 6.8 G/DL (ref 6.3–8)
RBC # BLD AUTO: 3.85 10*6/MM3 (ref 3.77–5.28)
SODIUM SERPL-SCNC: 140 MMOL/L (ref 134–145)
WBC # BLD AUTO: 5.39 10*3/MM3 (ref 3.4–10.8)

## 2020-12-29 PROCEDURE — 85025 COMPLETE CBC W/AUTO DIFF WBC: CPT

## 2020-12-29 PROCEDURE — 25010000003 HEPARIN LOCK FLUSH PER 10 UNITS: Performed by: INTERNAL MEDICINE

## 2020-12-29 PROCEDURE — 36591 DRAW BLOOD OFF VENOUS DEVICE: CPT

## 2020-12-29 PROCEDURE — 80053 COMPREHEN METABOLIC PANEL: CPT

## 2020-12-29 PROCEDURE — 99214 OFFICE O/P EST MOD 30 MIN: CPT | Performed by: NURSE PRACTITIONER

## 2020-12-29 RX ORDER — SODIUM CHLORIDE 0.9 % (FLUSH) 0.9 %
10 SYRINGE (ML) INJECTION AS NEEDED
Status: DISCONTINUED | OUTPATIENT
Start: 2020-12-29 | End: 2020-12-29 | Stop reason: HOSPADM

## 2020-12-29 RX ORDER — HEPARIN SODIUM (PORCINE) LOCK FLUSH IV SOLN 100 UNIT/ML 100 UNIT/ML
500 SOLUTION INTRAVENOUS AS NEEDED
Status: DISCONTINUED | OUTPATIENT
Start: 2020-12-29 | End: 2020-12-29 | Stop reason: HOSPADM

## 2020-12-29 RX ORDER — HEPARIN SODIUM (PORCINE) LOCK FLUSH IV SOLN 100 UNIT/ML 100 UNIT/ML
500 SOLUTION INTRAVENOUS AS NEEDED
Status: CANCELLED | OUTPATIENT
Start: 2020-12-29

## 2020-12-29 RX ORDER — SODIUM CHLORIDE 0.9 % (FLUSH) 0.9 %
10 SYRINGE (ML) INJECTION AS NEEDED
Status: CANCELLED | OUTPATIENT
Start: 2020-12-29

## 2020-12-29 RX ADMIN — SODIUM CHLORIDE, PRESERVATIVE FREE 10 ML: 5 INJECTION INTRAVENOUS at 08:33

## 2020-12-29 RX ADMIN — Medication 500 UNITS: at 08:33

## 2020-12-29 NOTE — PROGRESS NOTES
Patient aware she is to follow up with oncology and Miller CHUNG is okay with waiting until January 15th to see patient. Patient was very pleased and states she will wait until then.   
right

## 2020-12-29 NOTE — PROGRESS NOTES
REASON FOR FOLLOW UP:     1. Rectal cancer, rectal ultrasound examination in July 2019 reported T3N0 disease without lymphadenopathy. She does have small pulmonary nodule 6-7 mm and 2 mm with indeterminate feature. There is no solid evidence of metastatic disease otherwise. Patient has stage IIA (T3N0M0) disease.  2. The patient is heterozygous factor V Leiden, was on prophylactic anticoagulation with Lovenox 40 mg daily given her increased risk of thrombosis with MediPort and GI malignancy.   3. PET scan on 8/8/2019 reported an 8 mm hypermetabolic right upper lobe pulmonary nodule, which is suspicious for metastatic as well.    4. Patient had a PowerPort placement on the left upper chest by Dr. Joseph on 8/9/2019.  5. Patient was started on concurrent infusional 5-FU chemoradiation therapy on 8/12/2019, with planned complete surgical resection and further adjuvant chemotherapy with intention to cure the disease.   6. Patient underwent surgical resection of the primary rectal cancer by Dr. Ye on 12/2/2019.  Pathology evaluation reported residual T3N1 disease stage IIIb.  7. Diarrhea related to therapy and radiation.   8. Patient was started cycle 1 day 1 of adjuvant FOLFOX 6 on 1/23/2020.  9. On 2/5/2020 FOLFOX 6 cycle 2 delayed secondary to neutropenia.  Patient was given 3 days of Granix injection.  After cycle #2 we planned 3 days of Granix with each cycle.   10. Patient also intolerant of oral iron.  Patient received 2 doses of IV injectafer on 02/05/2020 and 02/12/2020.   11. 02/12/2020 Proceed with cycle #2 of FOLFOX 6 with 3 days of Granix.  12. On 3/11/2020 cycle 4 postponed secondary to abdominal pain and occasional low-grade fevers.  CT scans ordered.  13. Cycle #4 resumed after CT scan revealed no evidence of disease.  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 Ye. patient has no fever.  Continue to observe.   14. Grade 3  hand-foot syndrome secondary to 5-FU after cycle #7 FOLFOX 6 chemotherapy, prompting ER visit.  Also has worsening peripheral neuropathy.   15. Cycle #8 FOLFOX 6 was given on 5/13/2020, with 50% dose reduction for both 5-FU and oxaliplatin, and also examination of bolus 5-FU.   16. PET scan examination on 5/21/2020 reported no evidence of metastatic disease in the chest abdomen pelvis.  Stable 6 mm RUL pulmonary nodule.  17. On 5/27/2020, I discussed with the patient that we can discontinue chemotherapy at this time.   18. Patient had a surgical procedure for low anterior colon resection, coloanal anastomosis on 8/3/2020.  19. CT scan for chest abdomen pelvis on 9/9/2020 reported a new 8 mm noncalcified pulmonary nodule in the left lower lobe of the lung.  Otherwise stable right upper lobe 6 mm pulmonary nodule, and no evidence of disease recurrence in the abdomen or pelvis.  20. PET scan examination on 9/18/2020 reported multiple hypermetabolic small pulmonary nodules/ new pulmonary nodules.   21. Patient was started on pelvic chemotherapy with FOLFIRI regimen on 10/13/2020.   22. 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 It reported tumor mutation burden 5 Muts/Mb, microsatellite stable, TP53 mutation R282W, and others.  23. Hospitalization with new SVC and left brachiocephalic thrombus which developed while on anticoagulation with Xarelto    HISTORY OF PRESENT ILLNESS:  The patient is a 40 y.o. female with the above-mentioned history is here today for hospital return, repeat labs, and consideration of further chemotherapy.  She was hospitalized 12/21/2020 through 12/24/2020 with a new thrombus of the superior vena cava which developed while the patient was on Xarelto.  Patient had a new port placed 12/18/2020, and had held her Xarelto for 2 days prior to surgery.  She presented to the ER on 1221 with complaints of facial and arm swelling for 3 days.  She  also noted increased shortness of breath.  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  Thrombus within the right internal jugular vein cannot be excluded.  Patient was started on IV heparin, and eventually transitioned to Lovenox, which she now continues.    She returns today continuing to report swelling in her face, although it continues to improve.  She also continues to have some swelling in her upper extremities, again improving.  She does report weakness, and difficulty with ambulation.  She is unable to walk more than 5 or 6 steps before becoming very short of breath and weak.  She denies bleeding issues.  No fevers or chills.      Past Medical History:   Diagnosis Date   • Abdominopelvic abscess (CMS/HCC) 08/12/2020    ADMITTED TO [facility]   • Abnormal Pap smear of cervix 02/02/1998    JULIUS I   • Anemia in pregnancy    • Anxiety    • Bilateral epiphora 04/2020   • Bilateral hand swelling 05/02/2020    SEEN AT [facility] ER   • Cervical lymphadenitis 06/06/2012    SEEN AT [facility] ER   • Chemotherapy induced neutropenia (CMS/HCC) 04/2020   • Chemotherapy-induced nausea 02/2020   • Chemotherapy-induced thrombocytopenia 05/2020   • Chronic diarrhea    • Colon polyps     FOLLOWED BY DR. GERONIMO ESPARZA   • Cystitis 04/24/2020    WITH DEHYDRATION, ADMITTED TO [facility]   • Cystitis 10/27/2020   • Drug-induced peripheral neuropathy (CMS/HCC) 05/2020   • Fistula of intestine    • GERD (gastroesophageal reflux disease)    • Hand foot syndrome 05/2020   • Heart murmur     IN CHILDHOOD   • Hematochezia    • Hemorrhoids    • Heterozygous factor V Leiden mutation (CMS/[facility])     DX 8-2-2019   • History of anemia    • History of chemotherapy 2019    FOLLOWED BY DR. ALEXANDRU HUNT   • History of gestational diabetes    • History of pre-eclampsia    • History of radiation therapy 2019    FOLLOWED BY DR. JAVON LEWIS   • History of TB skin testing 01/17/2009    TB Skin Test   • HPV in female 1998   • Hypokalemia 09/2019    • Hypomagnesemia 2019   • IBS (irritable bowel syndrome)    • Ileostomy in place (CMS/HCC)     FOLLOWED BY DR. PADMAJA ESPARZA   • Infected insect bite of neck 2016    SEEN AT [facility]   • Kidney stones 2007    SEEN AT [facility] ER   • Lump of right breast     SWOLLEN LYMPH NODE   • Lung cancer (CMS/HCC) 2020    METASTATIC LUNG CANCER   • Monopolar depression (CMS/HCC)    • On anticoagulant therapy    • Perirectal abscess 2020   • PIH (pregnancy induced hypertension)    • Pulmonary embolism without acute cor pulmonale (CMS/HCC) 09/20/2019    X 3; ADMITTED TO [facility]   • Pulmonary nodule, right 2020   • Rectal cancer (CMS/HCC) 2019    STAGE IIA, INVASIVE MODERATELY DIFFERENTIATED ADENOCARCINOMA, CHEMO AND XRT FINISHED 2019   • Right shoulder pain 2020    SEEN AT [facility] ER   • Right ureteral stone 10/01/2019    SEEN AT [facility] ER   • SAB (spontaneous ) 1996     A2-1 INDUCED   • Sciatica    • Sepsis due to cellulitis (CMS/HCC) 2002    LEFT GREAT TOE, ADMITTED TO [facility]   • Tachycardia 2020   • Urinary urgency 2020     Past Surgical History:   Procedure Laterality Date   • CHOLECYSTECTOMY N/A 10/10/2003    LAPAROSCOPIC WITH CHOLANGIOGRAM, DR. JAMEY TALAVERA AT [facility]   • COLON RESECTION N/A 2019    Procedure: laparoscopic low anterior colon resection with mobilization of splenic flexure and diverting loop ileostomy: ERAS;  Surgeon: Padmaja Esparza MD;  Location: [facility] OR;  Service: General   • COLON RESECTION N/A 8/3/2020    Procedure: LOW ANTERIOR COLON RESECTION, COLOANAL ANASTOMOSIS, MOBILIZATION SPLENIC FLEXURE;  Surgeon: Padmaja Esparza MD;  Location: [facility] MAIN OR;  Service: General;  Laterality: N/A;   • COLONOSCOPY N/A 7/15/2020    PATENT ANASTAMOSIS IN MID RECTUM, RESCOPE IN 1 YR, DR. PADMAJA ESPARZA AT [facility]   • COLONOSCOPY W/ POLYPECTOMY N/A 2019    15 MM TUBULOVILLOUS ADENOMA POLYP IN HEPATIC FLEXURE, 20 MMTUBULOVILLOUS ADENOMA WITH  HIGH GRADE DYSPLASIA IN RECTOSIGMOID, 4 CM MASS IN MID RECTUM, PATH: INVASIVE MODERATELY DIFFERENTIATED ADENOCARCINOMA, DR. JENNIFER LI AT [facility]   • DILATATION AND EVACUATION N/A 2009   • ENDOSCOPY N/A 07/08/2019    LA GRADE A ESOPHAGITIS, GASTRITIS, ALL BIOPSIES BENIGN, DR. JENNIFER LI AT [facility]   • INCISION AND DRAINAGE PERIRECTAL ABSCESS N/A 8/14/2020    Procedure: INCISION AND DRAINAGE OF retrorectal dehiscence abcess with drain placement and irrigation;  Surgeon: Geronimo Ye MD;  Location: [facility] OR;  Service: General;  Laterality: N/A;   • PAP SMEAR N/A 01/24/2014   • SIGMOIDOSCOPY N/A 7/24/2019    INFILTRATIVE PARTIALLY OBSTRUCTING LARGE RECTAL CANCER, AREA TATOOED, DR. GERONIMO YE AT [facility]   • SIGMOIDOSCOPY N/A 11/23/2019    AN ULCERATED PARTIALLY OBSTRUCTING MASS IN MID RECTUM, AREA TATTOOED, DR. GERONIMO YE AT [facility]   • TRANSRECTAL ULTRASOUND N/A 7/24/2019    Procedure: ULTRASOUND TRANSRECTAL;  Surgeon: Geronimo Ye MD;  Location: [facility] ENDOSCOPY;  Service: General   • URETEROSCOPY LASER LITHOTRIPSY WITH STENT INSERTION Right 10/30/2019    DR. ESTUARDO BEASLEY AT [place]   • VAGINAL DELIVERY N/A 09/18/1998   • VENOUS ACCESS DEVICE (PORT) INSERTION Left 8/9/2019    Procedure: INSERTION VENOUS ACCESS DEVICE;  Surgeon: Sj Joseph MD;  [place] [facility] [place] [facility];  Service: General   • VENOUS ACCESS DEVICE (PORT) INSERTION N/A 12/18/2020    Procedure: INSERTION VENOUS ACCESS DEVICE right side, removal venous access device left side;  Surgeon: Sj Joseph MD;  [place] [facility] [place] [facility];  Service: General;  Laterality: N/A;   • WISDOM TOOTH EXTRACTION Bilateral 1993       HEMATOLOGIC/ONCOLOGIC HISTORY:      The patient reports she has intermittent rectal bleeding and urgency, mucous with her stool, starting sometime in 2016. At that time she was referred to GI service, and was diagnosed of irritable bowel syndrome. Nevertheless she had worsening urgency for  bowel movement, and had a couple of incidents losing control of stool. She was recently seen by Roland Thorpe MD again and had colonoscopy and EGD exam on 07/08/2019. She was found having a circumferential rectal mass. According to the procedure note, the patient had a fungating circumferential bleeding 4 cm mass in the middle rectum, at distance between 13 cm and 17 cm from the anus. Mass was causing partial obstruction. There were also colon polyps found at the hepatic flexure and also at the rectosigmoid junction 23 cm from the anus. Both were resected and retrieved. EGD examination reported grade A esophagitis at the GE junction and patchy discontinuous erythema and aggravation of the mucosa without bleeding in the stomach body. There is normal mucosa of the duodenum. Pathology evaluation from this procedure reported moderately differentiated adenocarcinoma involving the rectal mass. The rectal sigmoid junction polyp was tubular/tubulovillous adenoma with high grade dysplasia negative for invasive malignancy. The hepatic flexure polyp was also tubular/tubulovillous adenoma negative for high grade dysplasia or malignancy. The biopsy from the esophagus reports squamocolumnar mucosa with inflammatory changes suggestive of mild reflux esophagitis, negative for interstitial metaplasia. Gastric biopsy was benign and duodenal biopsy was also benign. There is no mention of H-pylori.     Patient was subsequently referred to colorectal surgeon Padmaja Ye MD for further evaluation. The patient had CT scan examination for chest, abdomen and pelvis requested by Dr. Ye and were done on 07/13/2019. The study reported no evidence of lymphadenopathy in the abdomen and pelvis. There was wall thickening of the rectosigmoid junction. Normal spleen, pancreas, adrenal glands and kidneys. There was fatty liver infiltration but no focal lymphatic lesions. There was a small 6-7 mm noncalcified nodule in the right upper lobe and a  tiny 3 mm subpleural nodule in the right middle lobe. No mediastinal or hilar lymphadenopathy.     Dr. Ye performed staging rectal ultrasound examination. This study reported tumor penetrating through the muscularis propria as T3 disease; there were no lymph nodes identified.    She had a hospitalization in late September 2019 because of newly discovered pulmonary emboli.  She was on prophylactic Lovenox prior to the incident of PE.  Now she is on full dose Xarelto anticoagulation.  Patient reports no further chest pain dyspnea.  She denies bleeding or bruising.  During her hospitalization, she was seen by Dr. Ye, who plans to have surgery 8 to 12 weeks after finishing radiation therapy.  She finished her radiation on 9/19/2019.     I noticed patient also presented to the emergency room on 10/1/2019 complaining of right flank area pain.  I reviewed the images studies and indeed she has a very small 1 to 2 mm obstructing kidney stone in the UV junction.  Patient is still symptomatic with some pains and dysuria.  She denies fever sweating or chills.    Laboratory study on 10/7/2019 reported normal CBC including hemoglobin 13.1, platelets 301,000, WBC 6170 and ANC 4900 lymphocytes 590 monocytes 480.      The nurse reported malfunction of port-a-catheter on 10/7/2019.  Port study on 10/8/2019 showed fibrin sheath around the distal aspect of the Mediport catheter in the SVC. This does not appear to hinder injection, but does prevent aspiration at this time.    Patient underwent surgical resection of the colon on 12/2/2019 with Dr. Ye.  Pathology evaluation reported residual T3 disease, also 1 out of 14 lymph nodes positive for malignancy.  So this patient in either had at least stage IIIb disease (T3 N1 M0?).      Adjuvant chemotherapy FOLFOX 6 will be started on 1/22/2020.  Laboratory study reported iron saturation 10%, free iron 31 TIBC 319 and ferritin 168.  Her hemoglobin was 11.8, WBC 5600, and platelets  347,000.    Patient was here on 02/12/2020 for cycle 2 of her FOLFOX.  This is delayed x1 week secondary to neutropenia.  The patient ultimately received 3 days worth of Neupogen with recovery of her blood counts.  Of note, the patient struggled with significant nausea without any episodes of vomiting following cycle #1 of chemotherapy resulting in significant weight loss.  She is up 12 pounds over the last week as her appetite has normalized.  We will add Emend to her care plan.    She is having several loose stools per her colostomy and has been hesitant to take Imodium due to prior history of constipation.  I encouraged her to try this with a maximum of 8 tablets/day.  She denies any infectious symptoms including fevers or chills.  No excess bleeding or bruising noted.  She had the expected cold sensitivity related to the Oxaliplatin for about 3 days following treatment.    Labs from 02/12/2020 demonstrated total white blood cell count of 5.14, ANC of 3.06, hemoglobin of 11.2, platelets of 211,000.  She was found to be iron deficient last week and is intolerant to oral iron secondary to GI distress.  For this reason, she initiated IV iron therapy with Injectafer last week.  She had received her second dose 02/12/2020    Patient has normalized hemoglobin 12.2 on 2/26/2020.    She reported on 5/5/2020 she had a recent visit to the emergency department for what was suspected to be an allergic reaction.  She called our on-call service on Saturday with reports of hand and face swelling.  She was instructed to proceed to the emergency department at which time she was assessed and prescribed a Medrol Dosepak.  She had just completed her 5-FU and was unhooked on Friday, 5/3/2020.  Her symptoms have improved.  She does report persistent hyperpigmentation and mild swelling of the palms of the hands but this is much improved.  It was her right hand specifically that was swollen.  Her facial swelling has resolved.  She  continues on Cefdinir nd since has 1 day remaining, she was prescribed cefdinir for a UTI requiring hospitalization at the end of April.  Reports no new symptoms.  Her labs are stable.  She is scheduled for treatment again.    Patient states at this time she is not tolerating her chemotherapy well.     Patient seen in the emergency department on 5/2/2020 for what was suspected to be an allergic reaction.  She called our on-call service on Saturday explaining that she was experiencing hand and face swelling.  She was instructed to proceed to the emergency department at which time she was assessed and prescribed a Medrol Dosepak.  She had just completed her 5-FU and was unhooked on Friday, 5/1/2020.      She reports since her ED visit on 5/2/2020 her symptoms have not improved. Her hands and feet were swollen upon presenting to the ED and she could barely make a fist. She states that she feels so swollen she is not able to stand it. She states that her skin on the hands and feet are peeling extensively as well, besides changing color to more dark.     She also reports significant fatigue after her first week of the 5-FU treatment but she expected this side effect. She also notices significant increase in her neuropathy to the point that she is not able to even walk around in her home without her house slippers due to irritation from her rugs. She denies and associated nausea or vomiting at this time. She also has episodes of epistaxis every day after the chemotherapy cycle #7.  She does report working full-time during the week of chemotherapy.    Laboratory studies, 5/13/2020, show moderate thrombocytopenia platelets 123,000, low normal WBC 4140 including ANC 2720 and normal hemoglobin 13.4.  Because significant hand-foot syndrome, will decrease both 5-FU and oxaliplatin by 50%, and eliminate bolus dose of 5-FU.    On 5/21/2020 patient had a PET scan performed which showed no convincing evidence of residual disease in  abdomen or pelvis, no metastatic disease within the chest or neck.     Cycle #8 FOLFOX 6 was given on 5/13/2020, with 50% dose reduction for both 5-FU and oxaliplatin, and also examination of bolus 5-FU.  She states on 5/27/2020 that with the recent reduction of the chemotherapy she feels significantly better. She has more energy and is able to do her daily routine.      PET scan examination on 5/21/2020 reported no evidence of metastatic disease in chest abdomen pelvis.  There was a stable 6 mm right upper lobe pulmonary nodule.    Laboratory studies on 5/27/2020 showed mild leukocytopenia WBC 3720 but a normal ANC 2250 and lymphocytes 630.  Normal platelets 163,000 and hemoglobin 12.6.  Chemistry lab reported normal renal function, liver function panel and a electrolytes, elevated glucose 164.    Laboratory studies 6/24/2020, showed normal hemoglobin 13.4 but macrocytosis .9.  Normal platelets 210,000 and WBC 5870.  She had normal CMP.     Patient last time was here in late June 2020.  Since that time she had surgical procedure for low anterior colon resection, coloanal anastomosis on 8/3/2020.  She later developed a perirectal abscess, required surgical drainage on 8/14/2020.  She was discharged on 8/27/2020.    Patient reports to me she still has lower abdominal wall vacuum suction in place.  She denies fever sweating chills.  Performance status is ECOG 1.  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  She does have visiting nurse come to the home for wound care.    CT scan for chest abdomen pelvis on 9/9/2020 reported a new 8 mm noncalcified pulmonary nodule in the left lower lobe.  Otherwise stable right upper lobe 6 mm pulmonary nodule, and no evidence of disease recurrence in the abdomen or pelvis.     Laboratory study on 9/16/2020 reported elevated AST 55, ALT 95, alk phosphatase 143 but normal total bilirubin 0.3.  Chemistry lab otherwise unremarkable.  She has completed  normal CBC.      Because of the abnormal CT scan examination for chest abdomen pelvis on 9/9/2020 reported a new 8 mm noncalcified pulmonary nodule in the left lower lobe, we obtained a PET scan examination for further evaluation, which was done on 9/18/2020.  This study reported several pulmonary nodules, some of them are hypermetabolic, newly developed compared to previous PET scan in May 2020.  Certainly does highly suspicious for metastatic disease.  There are also hypermetabolic activity in the abdomen and pelvic area which is hard to differentiate from surgical scar versus metastatic malignancy.    Laboratory study on 10/13/2020 reported normal CBC with Hb 13.9, platelets 302,000 and WBC 5520 including ANC 3830.  Chemistry lab reported normalized AST 20, alk phosphatase 116 improved ALT 35, and maintains normal bilirubin, with normal electrolytes and liver function panel.     Patient was started on first cycle of palliative chemotherapy FOLFIRI on 10/13/2020.    MEDICATIONS    Current Outpatient Medications:   •  clonazePAM (KlonoPIN) 1 MG tablet, Take 1 tablet by mouth 3 (Three) Times a Day As Needed for Anxiety or Seizures., Disp: 90 tablet, Rfl: 0  •  Diclofenac Sodium (VOLTAREN) 1 % gel gel, Apply 4 g topically to the appropriate area as directed 3 (Three) Times a Day., Disp: 150 g, Rfl: 3  •  dicyclomine (BENTYL) 20 MG tablet, TAKE 1 TABLET BY MOUTH EVERY 6 HOURS AS NEEDED, Disp: 360 tablet, Rfl: 0  •  diphenoxylate-atropine (LOMOTIL) 2.5-0.025 MG per tablet, Take 1 tablet by mouth 4 (Four) Times a Day As Needed for Diarrhea., Disp: 120 tablet, Rfl: 1  •  enoxaparin (Lovenox) 100 MG/ML solution syringe, Inject 1 mL under the skin into the appropriate area as directed Every 12 (Twelve) Hours., Disp: 60 mL, Rfl: 2  •  famotidine (PEPCID) 20 MG tablet, TAKE 1 TABLET BY MOUTH TWICE A DAY (Patient taking differently: Take 20 mg by mouth 2 (Two) Times a Day.), Disp: 180 tablet, Rfl: 1  •   HYDROcodone-acetaminophen (NORCO) 7.5-325 MG per tablet, Take 1 tablet by mouth Every 6 (Six) Hours As Needed for Moderate Pain ., Disp: 240 tablet, Rfl: 0  •  Loratadine (CLARITIN) 10 MG capsule, Take 10 mg by mouth Every Evening., Disp: , Rfl:   •  mirtazapine (REMERON SOL-TAB) 15 MG disintegrating tablet, Place 1 tablet on the tongue Every Night., Disp: 90 tablet, Rfl: 0  •  ondansetron (Zofran) 4 MG tablet, Take 1 tablet by mouth Every 6 (Six) Hours As Needed for Nausea or Vomiting for up to 10 doses., Disp: 10 tablet, Rfl: 1  •  ondansetron (ZOFRAN) 8 MG tablet, Take 1 tablet by mouth 3 (Three) Times a Day As Needed for Nausea or Vomiting., Disp: 60 tablet, Rfl: 5  •  prochlorperazine (COMPAZINE) 10 MG tablet, Take 1 tablet by mouth Every 6 (Six) Hours As Needed for Nausea or Vomiting., Disp: 30 tablet, Rfl: 2  No current facility-administered medications for this visit.     ALLERGIES:   No Known Allergies    SOCIAL HISTORY:       Social History     Tobacco Use   • Smoking status: Former Smoker     Packs/day: 1.00     Years: 24.00     Pack years: 24.00     Types: Electronic Cigarette, Cigarettes   • Smokeless tobacco: Never Used   • Tobacco comment: LAST CIGARETTE 8/12/2020   Substance Use Topics   • Alcohol use: Not Currently   • Drug use: No         FAMILY HISTORY:   Mother has positive factor V Leiden mutation, although she did not have thrombosis, mother also is coronary disease with stenting, she also is occasional bruising.  Maternal grandmother had DVT, she had multiple surgical procedures.  Patient mother's half-brother had metastatic colon cancer at diagnosis in his 50s.  Maternal great aunt has breast cancer.  Patient will follow his skin cancer in his 60s, exclusive.    REVIEW OF SYSTEMS:  Review of Systems   Constitutional: Negative for activity change, appetite change, chills, diaphoresis, fatigue, fever and unexpected weight change.   HENT: Negative for congestion, hearing loss, mouth sores,  "nosebleeds and trouble swallowing.    Eyes: Negative for photophobia and visual disturbance.   Respiratory: Positive for shortness of breath. Negative for cough and chest tightness.    Cardiovascular: Negative for chest pain, palpitations and leg swelling.   Gastrointestinal: Positive for abdominal pain (\"discomfort\" - reflux? see HPI). Negative for abdominal distention, anal bleeding, constipation, diarrhea and nausea.   Endocrine: Negative for cold intolerance and heat intolerance.   Genitourinary: Negative for dysuria and hematuria.   Musculoskeletal: Positive for neck pain (see HPI). Negative for arthralgias, back pain and joint swelling.   Skin: Positive for wound (Low abdomen wound with vacuum suction in place). Negative for color change and rash.   Allergic/Immunologic: Positive for immunocompromised state (Secondary to adjuvant chemotherapy). Negative for environmental allergies and food allergies.   Neurological: Positive for weakness. Negative for dizziness, numbness and headaches.   Hematological: Negative for adenopathy. Does not bruise/bleed easily.   Psychiatric/Behavioral: Negative for agitation, behavioral problems, confusion and suicidal ideas.              Vitals:    12/29/20 0755   BP: 113/78   Pulse: (!) 123   Resp: 16   Temp: 97.8 °F (36.6 °C)   SpO2: 96%   Weight: 102 kg (223 lb 14.4 oz)   Height: 166 cm (65.35\")   PainSc:   2   PainLoc: Comment: neck area     Current Status 12/29/2020   ECOG score 1     Physical Exam  Vitals signs reviewed.   Constitutional:       General: She is not in acute distress.     Appearance: Normal appearance. She is well-developed.      Comments: Seated in wheelchair, accompanied by her mom.   HENT:      Head: Normocephalic and atraumatic.      Comments: Facial swelling     Mouth/Throat:      Pharynx: No oropharyngeal exudate.   Eyes:      Pupils: Pupils are equal, round, and reactive to light.   Neck:      Musculoskeletal: Normal range of motion.   Cardiovascular: "      Rate and Rhythm: Normal rate and regular rhythm.      Heart sounds: Normal heart sounds. No murmur.   Pulmonary:      Effort: Pulmonary effort is normal. No respiratory distress.      Breath sounds: Normal breath sounds. No wheezing, rhonchi or rales.   Chest:      Comments: Benign-appearing right chest Mediport.  Bruising bilaterally on the upper chest.  Abdominal:      General: Bowel sounds are normal. There is no distension.      Palpations: Abdomen is soft.   Musculoskeletal: Normal range of motion.   Skin:     General: Skin is warm and dry.      Findings: No rash.   Neurological:      Mental Status: She is alert and oriented to person, place, and time.       RECENT LABS:    Lab Results   Component Value Date    WBC 5.39 12/29/2020    HGB 12.2 12/29/2020    HCT 37.8 12/29/2020    MCV 98.2 (H) 12/29/2020     12/29/2020     Lab Results   Component Value Date    NEUTROABS 3.76 12/29/2020     Glucose   Date Value Ref Range Status   12/29/2020 117 74 - 124 mg/dL Final     BUN   Date Value Ref Range Status   12/29/2020 8 6 - 20 mg/dL Final     Creatinine   Date Value Ref Range Status   12/29/2020 0.75 0.60 - 1.10 mg/dL Final   09/09/2020 0.60 0.60 - 1.30 mg/dL Final     Comment:     Serial Number: [id number]:  [id number]     Sodium   Date Value Ref Range Status   12/29/2020 140 134 - 145 mmol/L Final     Potassium   Date Value Ref Range Status   12/29/2020 4.0 3.5 - 4.7 mmol/L Final     Chloride   Date Value Ref Range Status   12/29/2020 105 98 - 107 mmol/L Final     CO2   Date Value Ref Range Status   12/29/2020 23.0 22.0 - 29.0 mmol/L Final     Calcium   Date Value Ref Range Status   12/29/2020 8.9 8.5 - 10.2 mg/dL Final     Total Protein   Date Value Ref Range Status   12/29/2020 6.8 6.3 - 8.0 g/dL Final     Albumin   Date Value Ref Range Status   12/29/2020 3.30 (L) 3.50 - 5.20 g/dL Final     ALT (SGPT)   Date Value Ref Range Status   12/29/2020 28 0 - 33 U/L Final     AST (SGOT)   Date Value Ref Range  Status   12/29/2020 20 0 - 32 U/L Final     Alkaline Phosphatase   Date Value Ref Range Status   12/29/2020 95 38 - 116 U/L Final     Total Bilirubin   Date Value Ref Range Status   12/29/2020 0.2 0.2 - 1.2 mg/dL Final     eGFR Non  Amer   Date Value Ref Range Status   12/29/2020 85 >60 mL/min/1.73 Final     BUN/Creatinine Ratio   Date Value Ref Range Status   12/29/2020 10.7 7.3 - 30.0 Final     Anion Gap   Date Value Ref Range Status   12/29/2020 12.0 5.0 - 15.0 mmol/L Final     Lab Results   Component Value Date    CEA 1.32 09/16/2020     IMAGING:  FLUOROSCOPIC GUIDED INJECTION OF LEFT MEDIPORT CATHETER FOR EVALUATION  OF PATENCY 12/8/2020     HISTORY: Evaluate Mediport patency.     The subclavian MediPort catheter was accessed in the standard fashion.  Iodinated contrast was injected. The catheter appears intact. The  catheter tip is seen in the SVC. There is slow flow of contrast through  the catheter however contrast does pass into the superior vena cava.     No blood could be aspirated. Small fibrin sheath is seen along the tip  of the catheter.     IMPRESSION:  Slow flow through the left subclavian MediPort catheter.  2. The catheter is patent with its tip in the SVC.  3. No blood could be aspirated.       2. MRI EXAMINATION OF THE CERVICAL SPINE WITHOUT CONTRAST AND MRI  EXAMINATION OF THE THORACIC SPINE WITH AND WITHOUT CONTRAST 12/1/2020     HISTORY: Neck pain, back pain, rectal cancer.     COMPARISON: No prior MRI examination of the cervical or thoracic spine  is available for comparison.     MRI EXAMINATION OF THE CERVICAL SPINE WITH AND WITHOUT CONTRAST     FINDINGS:  There is an area of T2 hyperintensity involving the anterior  aspect of the C5 vertebral body on the sagittal T2 sequence measuring 7  mm in size. This area is decreased in signal intensity on the T1  sequence and after contrast administration there was mild enhancement  along the margins. Signal intensity within the cervical cord  is normal  on the sagittal T2 sequence.     C2-3: There is no evidence of disc bulge or herniation.     C3-4: There is no evidence of disc bulge or herniation.     C4-5: There is a central disc bulge or protrusion which results in mild  canal stenosis. It mildly abuts and flattens the ventral surface of the  cord.     C5-6: A broad-based disc osteophyte complex is present which results in  mild flattening of the ventral surface of the thecal sac. On the axial  T1 precontrast sequence increased signal intensity is noted involving  the anterior aspect of the C5 vertebral body anteriorly and to the left  which corresponds to the area of T2 hyperintensity. This enhances after  contrast administration.     C6-7: There is no evidence of disc bulge or herniation.     C7-T1: There is no evidence of disc bulge or herniation.     IMPRESSION:  1. No evidence of focal herniation, cord signal abnormality or of  abnormal enhancement related to the cord. There is mild canal stenosis  secondary to a central disc bulge or protrusion at C4-5. This abuts and  mildly flattens the ventral surface of the cord.  2. There is enhancement involving the C5 vertebral body anteriorly and  to the left of midline. This is T2 hyperintense. It is subtly  hyperintense on the sagittal T1 sequence however on the axial T1  sequence it is more evident to be T1 hyperintense consistent with a  hemangioma. No convincing metastatic disease is identified.  MRI EXAMINATION OF THE THORACIC SPINE WITH AND WITHOUT CONTRAST     FINDINGS:  Signal intensity within the cord is normal on the sagittal  and axial T2 sequences. There is disc desiccation from T5 to T7. There  is mild loss of disc height at T5-6 and T6-7 and prominence of the  posterior aspect of the disc and endplates evident on the sagittal  images at T6-7.     There is a mild left paracentral disc osteophyte complex present at T5-6  resulting in mild flattening of the anterior lateral aspect of the  cord.  There is a larger central disc osteophyte complex present at T6-7 which  results in moderate canal stenosis and mild to moderate flattening of  the cord centrally. A hemangioma involving the lateral aspect of the T9  vertebral body is appreciated and a smaller hemangioma involving the  anterior aspect of the T5 vertebral body to the right is noted.     After contrast administration there was no evidence of abnormal  enhancement to suggest metastatic disease. The hemangioma enhances.     IMPRESSION:  1. Multilevel degenerative disease is noted as described in detail above  including a central focal disc osteophyte complex at T6-7 which abuts  and mildly flattens the cord centrally. A slightly less prominent but  broader based left paracentral disc osteophyte complex is present at  T5-6 resulting in flattening of the cord centrally and to the left.  There was no evidence of cord signal abnormality. See above.  2. No evidence of metastatic disease. A hemangioma involving T9 was  appreciated with enhancement. Smaller hemangioma involving T5 anteriorly  and to the right was also present.           CT ANGIOGRAM 11/16/2020   FINDINGS: The pulmonary arteries are well opacified with intravenous  contrast.There is no convincing filling defect to suggest pulmonary  artery thromboembolism. Trace pericardial effusion. No pleural effusion  is seen. No pathological mediastinal lymphadenopathy. The central  airways are patent without endobronchial lesion. 5 mm left lower lobe  lung nodule somewhat decreased in the interim from prior imaging. An  anterior right upper lobe nodule seen on image 46 significant interim  change in size. Additional nodules are seen within the left upper lobe  on image 57, 73, right middle lobe on image 93 mild discoid atelectatic  changes are seen particularly within the right lung base.  No new pulmonary nodules are seen. A left chest wall port has its tip at  the cavoatrial junction.     The  visualized upper abdomen demonstrates changes from prior  cholecystectomy mild diffuse hepatic steatosis is suspected.    IMPRESSION:  No convincing evidence of pulmonary artery thromboembolism    CT Cervical Spine:  IMPRESSION:  1. The images are very grainy from C5 down to the superior endplate of  T2 thoracic level and limits evaluation of bony detail. There is  reversal of the normal cervical lordosis centered at C3-4 but overall  the cervical spine CT is within normal limits with no disc herniation,  canal or foraminal narrowing identified. No obvious lytic or blastic  lesions seen in the cervical spine with no CT evidence of metastatic  disease to the cervical spine. The etiology of the right trapezius pain  is not established on this exam. An MRI of the cervical and thoracic  spine with and without contrast could be obtained for a more  comprehensive assessment, if clinically indicated.               Assessment/Plan      ASSESSMENT:   1.  Rectal cancer, rectal ultrasound examination reported T3N0 disease without lymphadenopathy.   · CT scan of chest, abdomen and pelvis reported no lymphadenopathy in the abdomen, pelvis or chest. She does have small pulmonary nodule 6-7 mm and 2 mm with indeterminate feature. There is no solid evidence of metastatic disease otherwise.   · Based on the CT scan and rectal ultrasound imaging studies, this patient had stage IIA (T3N0M0) disease.    · She had PET scan examination on 8/8/2019 which reported a hypermetabolic right upper lobe pulmonary nodule 6 mm with SUV 5.6.  This is suspicious for metastatic disease however it is too small to be biopsied.  This patient may have stage IV disease.   · She initiated concurrent radiation with continuous 5-FU on 8/12/2019.  · Patient finished concurrent chemoradiation therapy.  · Patient underwent surgical resection of the rectal tumor and diverting loop ileostomy on 12/2/2019 with Dr. Ye.  Pathology evaluation reported residual T3  disease, with 1 out of 14 lymph nodes positive for malignancy.  Certainly this patient has at least stage IIIb rectal cancer (T3 N1 M0?)  · On 1/22/2020 adjuvant chemotherapy FOLFOX 6 regimen initiated.    · On 2/5/2022 cycle #2 was delayed secondary to neutropenia.  She was given 3 days of Granix.   · Emend added with cycle 3.  With improved nausea control  · Continuing home Granix x3 days following 5-FU disconnect  · 3/11/2020 due for cycle 4, however, she is experiencing progressive abdominal pain and occasional fevers.   · CT scan performed on 3/13/2020 reveals no evidence of progressive or recurrent disease.  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  · Patient hospitalized 4/24-4/26/20 after cycle 5 of chemotherapy with acute UTI.  CT abdomen/pelvis noting fluid collection in the presacral region having diminished in size compared to CT dated 3/13/2020.  Patient was evaluated by Dr. Ye while in the hospital with further plans to evaluate possible colovaginal fistula following completion of chemotherapy.  Patient did respond to IV antibiotics and discharged home on oral cefdinir.  · 4/29/2020 cycle 6 of FOLFOX.  Urinary symptoms have resolved   · Patient seen in the [facility] ED on 5/2/2020 for what was suspected to be an allergic reaction.  She called our service on Saturday explaining that she was experiencing hand and face swelling.  She was instructed to proceed to the emergency department at which time she was assessed and prescribed a Medrol Dosepak.  She had just completed her 5-FU and was unhooked on Friday, 5/1/2020.  Her symptoms have resolved in the office on assessment, 5/5/2020.  · The patient had grade 3 hand-foot syndrome based on symptomology.  Patient had cycle #8 of 5-FU on 5/13/2020. Due to her symptoms and poor tolerance to the 5-FU, her treatment dose will be reduced 50% for oxaliplatin and infusional 5-FU.  Bolus 5-FU will be discontinued..  · On 5/13/2020  We discussed further  treatment options pending the scan results.  If she has indeed residual disease or metastatic disease, we will switch her to irinotecan plus Avastin or anti-EGFR monoclonal antibody.  She will need a further molecular testing of her tumor samples in that case.  · On 5/21/2020 patient had a PET scan and it showed no evidence of of metastatic disease in the neck, chest, abdomen or pelvis.  There was fluid accumulation/abscess.   · On 5/27/2020 I discussed with the patient that we can discontinue chemotherapy at this time.  She will follow-up with Dr. Ye to discuss a possibility to reverse the ileostomy.  We likely will obtain anther PET scan in 3 to 4 months for reassessment.    · Patient was seen by Dr. Ye on 6/19/2019 for evaluation and to discuss possible take down of her ileostomy.  She is scheduled to have a gastrografin enema on 7/2/2020 to evaluate for a fistula, and then a colonoscopy on 7/15/2020, both done by Dr. Ye.  She states that based on the above imaging and procedure findings, she may have a more extensive surgery done or just have her ileostomy reversed, which would likely be done in August 2020.  This will all be coordinated under the care of Dr. Ye.     · I reviewed scan results with the patient on 9/16/2020 for the most recent CT scan from 09/09/2020 and also compared it to her previous PET scan examination from 05/21/2020 and also the original PET scan from 08/09/2019.  The original PET scan there is a small right upper lobe pulmonary nodule 6 mm with SUV 5.6. So in the 05/2020 PET scan the nodule is still there but activity seems much less and this most recent CT scan examination also documented the preexisting small pulmonary nodule however there is a new left lower lobe pulmonary nodule 8 mm in size and I shared with the patient today this nodule is suspicious for metastatic disease. Laboratory studies reported minimal CEA level 1.32 on 09/09/2020. Liver function panel was only  marginally abnormal with the ALT 45, the remaining is normal. However laboratory study today showed worsening AST 55, ALT 95 and alkaline phosphatase 143 but is still normal, total bilirubin 0.3.   · So I discussed with the patient on 9/16/2020 recommended to have repeat PET scan examination for assessment because the left lower lobe pulmonary nodule is too small to be biopsied, and if the PET scan reports hypermetabolic lesion, I recommended to have stereotactic radiation therapy. Explained to patient that she is not a really good candidate to have thoracotomy for resection because she still has unhealed wound in the abdomen. I think the stereotactic radiation therapy will be a more feasible choice.  · Laboratory study on 9/16/2020 reported worsening liver function panel with both elevated AST, ALT and also slightly worsened alkaline phosphatase despite having normal total bilirubin. I recommended to repeat laboratory study for reevaluation. The only new medication she has in the past several days is Augmentin but otherwise no change of condition. She denies any recent viral infection. We will monitor this.   · PET scan examination obtained on 9/18/2020 showed metastatic disease.  It reported a few hypermetabolic pulmonary nodules, and some new small pulmonary nodules, all of them highly suspicious for metastatic disease.  She also has hypermetabolic activity in the abdomen and pelvic area which could be related to scar tissue from her recent surgery versus metastatic disease.  Nevertheless overall picture fits with metastatic cancer.  · I discussed with the patient on 9/20/2020, we reviewed the PET scan images together, and I recommended to have systematic chemotherapy, I do not think stereotactic reading therapy to the hypermetabolic lesions in the lungs warranted at this time because even those will be treated with reading therapy, she will still need systematic chemotherapy.  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  I did discuss with the patient using anti-EGFR monoclonal antibody versus anti-VEGF monoclonal antibody.     · Palliative chemotherapy cycle#1 FOLFIRI was started on 10/13/2020.  No bolus 5-FU given considering previous poor tolerance.  Genetic studies still pending.   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  Microsatellite stable, mutation burden 5/Mb.    · Discussed with the patient 10/27/2020, because of the K-[place] mutation, she is not a candidate for anti-EGFR monoclonal antibody such as Erbitux or vectibix.  She could be a candidate for anti-VEGF monoclonal antibody, however because of active wound, she is not a candidate right now at this moment.  · Patient seen in the ED for acute right neck pain on 11/16/2020.  CT angiogram as well as CT of the cervical spine performed with no acute findings but notably one of her pulmonary nodules, left upper lobe, somewhat decreased in size.  Patient given prednisone pack.  Further details below.  · Patient due today for cycle #4 FOLFIRI.  Plan repeat PET scan after cycle 6.  · Last received FOLFIRI on 12/8/2020, cycle 5.  Scheduled for cycle 6 on 12/29/2020.  Plans for PET scan following cycle 6.  · Patient here 12/29/2020 for evaluation and consideration of cycle 6, but she continues to so we can complain of swelling.  She does have swelling typically after treatment as well.  We will hold treatment for 1 week and reevaluate next week.  Still planning on PET scan following cycle 6.    2 .  Pulmonary nodule, hypermetabolic on PET scan.   · Her original PET scan examination from 8/8/2019 reported a small 8 mm right upper apex pulmonary nodule but hypermetabolic SUV 5.1.  That was too small to be biopsied, however there was always a possibility of metastatic disease considering that high activity despite a such a small nodule.  · Her chest CT scan examination from 3/13/2020 reported this  shrank to 6 mm.    · PET scan examination on 5/21/2020 reported no metabolic activity at this residual nodule.  This needs to be monitored.    · PET scan examination 9/18/2020 reported couple of hypermetabolic pulmonary nodules, besides a few extra small pulmonary nodules.  Those are highly suspicious for metastatic disease.      3.   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  Low-dose Lovenox discontinued and initiated Xarelto 20 mg daily.  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  · Patient held Xarelto for 2 days prior to MediPort removal from the left chest wall and placement of new port in the right chest wall on 12/18/2020.  She resumed Xarelto that evening.  · Progressive swelling in the neck, face, upper extremities prompting current hospitalization with CT scan showing thrombosis in the right subclavian vein and SVC.  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  Although she had been off of Xarelto for a few days, clearly Xarelto was not prohibiting progression of her thrombosis after she resumed treatment and there was some evidence to suggest thrombosis had been present at least in the innominate vein on prior scan in September but now appears chronic.  Current acute thrombosis involving SVC and right subclavian.  · Patient was admitted and placed on heparin drip  · Patient was evaluated by vascular surgery and intervention  was not recommended for thrombolysis/thrombectomy.  · On 12/22/2020, the patient developed worsening shortness of breath, increasing upper extremity edema consistent with worsening SVC syndrome.  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  · Symptoms improved and patient was discharged 12/24/2020 on Lovenox 100 mg every 12 hours.    · Returns 12/29/2020 for evaluation continuing Lovenox, overall tolerating it well.  No bleeding issues.    4.  This patient also has strong family history for malignancy the patient had appointment with genetic counseling on September 3, 2019.  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    5.  Mild anemia, improved since her surgery.    · She also has a history of iron deficiency.  Iron studies reveal a saturation of just 10%.  She was started on oral iron but was unable to tolerate due to GI side effects.   · Status post Injectafer 2/5/2020 and 2/12/2020   · Hemoglobin today 12.2.    6.  Peripheral neuropathy secondary to chemotherapy.    · This is mild involving bilateral hands and feet as reported on 9/16/2020.   · Will avoid chemotherapy with oxaliplatin.  · Stable mild neuropathy as of 11/10/2020    7.  History of hand-foot syndrome grade 3.    · It become so significant after cycle #7 FOLFOX treatment.  Discussed with patient on 5/13/2020, will discontinue the bolus 5-FU dose, and also decrease 50% of the infusion dose through the pump.   · We also use Medrol Dosepak for possible recurrent symptomology.  She responded this well.   · Her hand-foot syndrome improved.  · Under current treatment with FOLFIRI, patient not receiving 5-FU bolus.  No recurrent issues.    8.  Hyperlacrimation and mild scleral irritation related to 5-FU.  She has been taking steroid eyedrops.  This has improved her hyperlacrimation.  · Not currently an issue.  Continue to monitor with 5-FU.       9.  Abnormal liver function panel.  · Improved liver function panel 9/18/2020.  This is probably related to her recent infection.  · She has normalized AST, alk phosphatase, and a much improved only marginally elevated ALT 35 on 10/13/2020.    · Normalized liver function panel on 10/27/2020.    · Normal liver panel on 11/10/2020.  Continue to monitor.    10.  Intermittent leukocytopenia secondary to chemotherapy.   · WBC currently normal at 5.8, ANC 4.1.  Continue to monitor.  Consider G-CSF if neutropenia becomes an ongoing issue.      11.  Depression.  Patient seen by JULIET Davenport on 11/9/2020.  She was started on mirtazapine.  Lexapro was discontinued.  This has definitely improved her mood with the patient feeling overall much better.  She is sleeping better.  Appetite is improved with her actually eating more than she wants to.  She plans to continue follow-up with supportive oncology.    12.  Right neck/shoulder pain occurring twice now.  Patient reports that this occurred with both cycle #2 and cycle #3.  She did not mention it with cycle #2.  She did mention it was cycle #3 and per review of the EMR nursing note it was during irinotecan infusion.  Pain then subsided.  However it returned 5 days later with the patient going to the ED on 11/16/2020.  As imaging details are noted above, no acute findings were found.  Etiology remains unclear.  Patient's Mediport is in the opposite side of her chest.  She was given a prednisone taper which she continues at this time.  I do still wonder if she is having some kind of weird reaction to irinotecan.  I did encourage the patient to speak up if she has recurrent pain during treatment today.  She verbalized understanding.    13.  Intermittent upper abdominal discomfort.  This improves with eating.  After further discussion with the patient she also notes 3 nights of increased reflux when laying down.  We discussed her symptoms could be related to increase  stomach acid or potential ulcer.  She is currently taking Pepcid 20 mg daily.  I instructed her to add Prilosec 20 mg daily.       14.  Weakness.  Unable to take more than 5 steps with out become very weak and short of breath.  Patient will need a wheelchair to help with mobility.     Patient unable to safely use a cane or walker. Due to immobility related to metastatic cancer, a standard wheelchair is needed to assist with daily living activities inside the home. Patient has upper body strength and mental capability to propel the wheelchair inside the home.        PLAN:  1. Hold treatment today.  2. Continue Lovenox 100 mg SQ every 12 hours.  3. Return in 1 week for follow-up visit with Dr. Nguyen for reevaluation and consideration of resuming with cycle 6 FOLFIRI.    4. Plans to repeat a PET scan after 6 cycles.  5. Consider possibly addition of Avastin if abdominal wound is completely closed at that point.        Sheba Matias, APRN  12/29/20        CC:  Deepika Vela III NP-C   MD Perla Bowers MD

## 2021-01-04 ENCOUNTER — OFFICE VISIT (OUTPATIENT)
Dept: SURGERY | Facility: CLINIC | Age: 41
End: 2021-01-04

## 2021-01-04 VITALS
SYSTOLIC BLOOD PRESSURE: 118 MMHG | OXYGEN SATURATION: 97 % | HEIGHT: 66 IN | HEART RATE: 130 BPM | WEIGHT: 220 LBS | BODY MASS INDEX: 35.36 KG/M2 | DIASTOLIC BLOOD PRESSURE: 88 MMHG

## 2021-01-04 DIAGNOSIS — R30.0 DYSURIA: Primary | ICD-10-CM

## 2021-01-04 DIAGNOSIS — Z93.2 ILEOSTOMY PRESENT (HCC): ICD-10-CM

## 2021-01-04 DIAGNOSIS — R35.0 URINARY FREQUENCY: ICD-10-CM

## 2021-01-04 DIAGNOSIS — C20 RECTAL CANCER (HCC): ICD-10-CM

## 2021-01-04 PROCEDURE — 99212 OFFICE O/P EST SF 10 MIN: CPT | Performed by: COLON & RECTAL SURGERY

## 2021-01-04 NOTE — PROGRESS NOTES
"Carole Weaver is a 40 y.o. female in for follow up of Dysuria    Urinary frequency    Ileostomy present (CMS/HCC)    Rectal cancer (CMS/HCC)     Removed port; blood clot in SVC; was on Xarelto BID.  Now transitioned over to Lovenox.  She states that even though she was on prophylactic dose Lovenox before and developed a DVT that heme-onc believes that a higher/ therapeutic dose will be okay.  She states the swelling in her neck and face is going down.  Thinks she's developing a UTI.  Having a lot of urgency.  Denies rectal drainage.   Patient states she feels like she is dying.  She is continuing to see you the oncology group psychiatrist.  She states she does not think we will ever get to take the ileostomy down  /88 (BP Location: Left arm, Patient Position: Sitting, Cuff Size: Large Adult)   Pulse (!) 130   Ht 167.6 cm (66\")   Wt 99.8 kg (220 lb)   LMP  (LMP Unknown)   SpO2 97%   Breastfeeding No   BMI 35.51 kg/m²   Body mass index is 35.51 kg/m².      PE:  Physical Exam  Exam conducted with a chaperone present.   Constitutional:       General: She is not in acute distress.     Appearance: She is well-developed.   HENT:      Head: Normocephalic and atraumatic.   Abdominal:      General: There is no distension.      Palpations: Abdomen is soft.      Comments: Stoma ppp    Midline abd incision pink, scabbed   Musculoskeletal: Normal range of motion.   Neurological:      Mental Status: She is alert.   Psychiatric:         Thought Content: Thought content normal.           Assessment:   1. Dysuria    2. Urinary frequency    3. Ileostomy present (CMS/HCC)    4. Rectal cancer (CMS/HCC)         Plan:  Will run UA today and call results; Rx if needed. RTC in about 3 months; patient will call to schedule.    Scribed for Padmaja Ye MD by JULIET Fulton. 1/4/2021  09:08 EST  This patient was evaluated by me, recommendations made, documentation reviewed, edited, and revised by me, Padmaja Ye, " MD

## 2021-01-05 ENCOUNTER — TELEPHONE (OUTPATIENT)
Dept: ONCOLOGY | Facility: CLINIC | Age: 41
End: 2021-01-05

## 2021-01-05 ENCOUNTER — READMISSION MANAGEMENT (OUTPATIENT)
Dept: CALL CENTER | Facility: HOSPITAL | Age: 41
End: 2021-01-05

## 2021-01-05 ENCOUNTER — INFUSION (OUTPATIENT)
Dept: ONCOLOGY | Facility: HOSPITAL | Age: 41
End: 2021-01-05

## 2021-01-05 ENCOUNTER — OFFICE VISIT (OUTPATIENT)
Dept: ONCOLOGY | Facility: CLINIC | Age: 41
End: 2021-01-05

## 2021-01-05 VITALS
OXYGEN SATURATION: 95 % | HEIGHT: 65 IN | WEIGHT: 215.3 LBS | RESPIRATION RATE: 16 BRPM | TEMPERATURE: 97.5 F | SYSTOLIC BLOOD PRESSURE: 129 MMHG | BODY MASS INDEX: 35.87 KG/M2 | DIASTOLIC BLOOD PRESSURE: 88 MMHG | HEART RATE: 119 BPM

## 2021-01-05 DIAGNOSIS — R35.0 URINARY FREQUENCY: ICD-10-CM

## 2021-01-05 DIAGNOSIS — I82.210 THROMBOSIS OF SUPERIOR VENA CAVA (HCC): ICD-10-CM

## 2021-01-05 DIAGNOSIS — C20 ADENOCARCINOMA OF RECTUM (HCC): ICD-10-CM

## 2021-01-05 DIAGNOSIS — Z79.01 ANTICOAGULATION ADEQUATE: ICD-10-CM

## 2021-01-05 DIAGNOSIS — G62.0 DRUG-INDUCED PERIPHERAL NEUROPATHY (HCC): ICD-10-CM

## 2021-01-05 DIAGNOSIS — I26.99 OTHER PULMONARY EMBOLISM WITHOUT ACUTE COR PULMONALE, UNSPECIFIED CHRONICITY (HCC): ICD-10-CM

## 2021-01-05 DIAGNOSIS — R30.0 DYSURIA: ICD-10-CM

## 2021-01-05 DIAGNOSIS — C78.00 MALIGNANT NEOPLASM METASTATIC TO LUNG, UNSPECIFIED LATERALITY (HCC): ICD-10-CM

## 2021-01-05 DIAGNOSIS — C20 ADENOCARCINOMA OF RECTUM (HCC): Primary | ICD-10-CM

## 2021-01-05 LAB
ALBUMIN SERPL-MCNC: 3.5 G/DL (ref 3.5–5.2)
ALBUMIN/GLOB SERPL: 1 G/DL (ref 1.1–2.4)
ALP SERPL-CCNC: 103 U/L (ref 38–116)
ALT SERPL W P-5'-P-CCNC: 28 U/L (ref 0–33)
ANION GAP SERPL CALCULATED.3IONS-SCNC: 10.5 MMOL/L (ref 5–15)
AST SERPL-CCNC: 19 U/L (ref 0–32)
BASOPHILS # BLD AUTO: 0.03 10*3/MM3 (ref 0–0.2)
BASOPHILS NFR BLD AUTO: 0.6 % (ref 0–1.5)
BILIRUB SERPL-MCNC: 0.2 MG/DL (ref 0.2–1.2)
BILIRUB UR QL STRIP: NEGATIVE
BUN SERPL-MCNC: 9 MG/DL (ref 6–20)
BUN/CREAT SERPL: 11.5 (ref 7.3–30)
CALCIUM SPEC-SCNC: 9.3 MG/DL (ref 8.5–10.2)
CHLORIDE SERPL-SCNC: 104 MMOL/L (ref 98–107)
CLARITY UR: ABNORMAL
CO2 SERPL-SCNC: 24.5 MMOL/L (ref 22–29)
COLOR UR: YELLOW
CREAT SERPL-MCNC: 0.78 MG/DL (ref 0.6–1.1)
DEPRECATED RDW RBC AUTO: 62.2 FL (ref 37–54)
EOSINOPHIL # BLD AUTO: 0.15 10*3/MM3 (ref 0–0.4)
EOSINOPHIL NFR BLD AUTO: 2.9 % (ref 0.3–6.2)
ERYTHROCYTE [DISTWIDTH] IN BLOOD BY AUTOMATED COUNT: 17.1 % (ref 12.3–15.4)
GFR SERPL CREATININE-BSD FRML MDRD: 81 ML/MIN/1.73
GLOBULIN UR ELPH-MCNC: 3.6 GM/DL (ref 1.8–3.5)
GLUCOSE SERPL-MCNC: 109 MG/DL (ref 74–124)
GLUCOSE UR STRIP-MCNC: NEGATIVE MG/DL
HCT VFR BLD AUTO: 42.2 % (ref 34–46.6)
HGB BLD-MCNC: 13.5 G/DL (ref 12–15.9)
HGB UR QL STRIP.AUTO: NEGATIVE
IMM GRANULOCYTES # BLD AUTO: 0.04 10*3/MM3 (ref 0–0.05)
IMM GRANULOCYTES NFR BLD AUTO: 0.8 % (ref 0–0.5)
KETONES UR QL STRIP: NEGATIVE
LEUKOCYTE ESTERASE UR QL STRIP.AUTO: NEGATIVE
LYMPHOCYTES # BLD AUTO: 1.05 10*3/MM3 (ref 0.7–3.1)
LYMPHOCYTES NFR BLD AUTO: 20.1 % (ref 19.6–45.3)
MCH RBC QN AUTO: 31.5 PG (ref 26.6–33)
MCHC RBC AUTO-ENTMCNC: 32 G/DL (ref 31.5–35.7)
MCV RBC AUTO: 98.6 FL (ref 79–97)
MONOCYTES # BLD AUTO: 0.43 10*3/MM3 (ref 0.1–0.9)
MONOCYTES NFR BLD AUTO: 8.2 % (ref 5–12)
NEUTROPHILS NFR BLD AUTO: 3.52 10*3/MM3 (ref 1.7–7)
NEUTROPHILS NFR BLD AUTO: 67.4 % (ref 42.7–76)
NITRITE UR QL STRIP: NEGATIVE
NRBC BLD AUTO-RTO: 0 /100 WBC (ref 0–0.2)
PH UR STRIP.AUTO: 5.5 [PH] (ref 4.5–8)
PLATELET # BLD AUTO: 322 10*3/MM3 (ref 140–450)
PMV BLD AUTO: 8.4 FL (ref 6–12)
POTASSIUM SERPL-SCNC: 4.3 MMOL/L (ref 3.5–4.7)
PROT SERPL-MCNC: 7.1 G/DL (ref 6.3–8)
PROT UR QL STRIP: ABNORMAL
RBC # BLD AUTO: 4.28 10*6/MM3 (ref 3.77–5.28)
SODIUM SERPL-SCNC: 139 MMOL/L (ref 134–145)
SP GR UR STRIP: 1.02 (ref 1–1.03)
UROBILINOGEN UR QL STRIP: ABNORMAL
WBC # BLD AUTO: 5.22 10*3/MM3 (ref 3.4–10.8)

## 2021-01-05 PROCEDURE — 25010000002 FOSAPREPITANT PER 1 MG: Performed by: INTERNAL MEDICINE

## 2021-01-05 PROCEDURE — 96416 CHEMO PROLONG INFUSE W/PUMP: CPT

## 2021-01-05 PROCEDURE — 25010000002 FLUOROURACIL PER 500 MG: Performed by: INTERNAL MEDICINE

## 2021-01-05 PROCEDURE — 96375 TX/PRO/DX INJ NEW DRUG ADDON: CPT

## 2021-01-05 PROCEDURE — 99215 OFFICE O/P EST HI 40 MIN: CPT | Performed by: INTERNAL MEDICINE

## 2021-01-05 PROCEDURE — 25010000002 IRINOTECAN PER 20 MG: Performed by: INTERNAL MEDICINE

## 2021-01-05 PROCEDURE — 80053 COMPREHEN METABOLIC PANEL: CPT

## 2021-01-05 PROCEDURE — 25010000002 DEXAMETHASONE PER 1 MG: Performed by: INTERNAL MEDICINE

## 2021-01-05 PROCEDURE — 96417 CHEMO IV INFUS EACH ADDL SEQ: CPT

## 2021-01-05 PROCEDURE — 96368 THER/DIAG CONCURRENT INF: CPT

## 2021-01-05 PROCEDURE — 81003 URINALYSIS AUTO W/O SCOPE: CPT | Performed by: INTERNAL MEDICINE

## 2021-01-05 PROCEDURE — 96367 TX/PROPH/DG ADDL SEQ IV INF: CPT

## 2021-01-05 PROCEDURE — 25010000002 ATROPINE PER 0.01 MG: Performed by: INTERNAL MEDICINE

## 2021-01-05 PROCEDURE — 87086 URINE CULTURE/COLONY COUNT: CPT | Performed by: COLON & RECTAL SURGERY

## 2021-01-05 PROCEDURE — 25010000002 PALONOSETRON PER 25 MCG: Performed by: INTERNAL MEDICINE

## 2021-01-05 PROCEDURE — 25010000002 LEUCOVORIN CALCIUM PER 50 MG: Performed by: INTERNAL MEDICINE

## 2021-01-05 PROCEDURE — 96413 CHEMO IV INFUSION 1 HR: CPT

## 2021-01-05 PROCEDURE — 85025 COMPLETE CBC W/AUTO DIFF WBC: CPT

## 2021-01-05 PROCEDURE — 96415 CHEMO IV INFUSION ADDL HR: CPT

## 2021-01-05 RX ORDER — ATROPINE SULFATE 1 MG/ML
0.25 INJECTION, SOLUTION INTRAMUSCULAR; INTRAVENOUS; SUBCUTANEOUS
Status: CANCELLED | OUTPATIENT
Start: 2021-01-05

## 2021-01-05 RX ORDER — SODIUM CHLORIDE 9 MG/ML
250 INJECTION, SOLUTION INTRAVENOUS ONCE
Status: CANCELLED | OUTPATIENT
Start: 2021-01-05

## 2021-01-05 RX ORDER — SODIUM CHLORIDE 9 MG/ML
250 INJECTION, SOLUTION INTRAVENOUS ONCE
Status: COMPLETED | OUTPATIENT
Start: 2021-01-05 | End: 2021-01-05

## 2021-01-05 RX ORDER — CEFDINIR 300 MG/1
300 CAPSULE ORAL 2 TIMES DAILY
Qty: 10 CAPSULE | Refills: 0 | Status: SHIPPED | OUTPATIENT
Start: 2021-01-05 | End: 2021-01-15

## 2021-01-05 RX ORDER — PALONOSETRON 0.05 MG/ML
0.25 INJECTION, SOLUTION INTRAVENOUS ONCE
Status: COMPLETED | OUTPATIENT
Start: 2021-01-05 | End: 2021-01-05

## 2021-01-05 RX ORDER — ATROPINE SULFATE 0.4 MG/ML
0.25 AMPUL (ML) INJECTION
Status: DISCONTINUED | OUTPATIENT
Start: 2021-01-05 | End: 2021-01-05 | Stop reason: HOSPADM

## 2021-01-05 RX ORDER — PALONOSETRON 0.05 MG/ML
0.25 INJECTION, SOLUTION INTRAVENOUS ONCE
Status: CANCELLED | OUTPATIENT
Start: 2021-01-05

## 2021-01-05 RX ADMIN — DEXAMETHASONE SODIUM PHOSPHATE 12 MG: 10 INJECTION INTRAMUSCULAR; INTRAVENOUS at 09:23

## 2021-01-05 RX ADMIN — FLUOROURACIL 4850 MG: 50 INJECTION, SOLUTION INTRAVENOUS at 11:23

## 2021-01-05 RX ADMIN — ATROPINE SULFATE 0.25 MG: 0.4 INJECTION, SOLUTION INTRAMUSCULAR; INTRAVENOUS; SUBCUTANEOUS at 09:41

## 2021-01-05 RX ADMIN — SODIUM CHLORIDE 100 ML: 9 INJECTION, SOLUTION INTRAVENOUS at 08:46

## 2021-01-05 RX ADMIN — SODIUM CHLORIDE 810 MG: 900 INJECTION, SOLUTION INTRAVENOUS at 09:43

## 2021-01-05 RX ADMIN — SODIUM CHLORIDE 250 ML: 9 INJECTION, SOLUTION INTRAVENOUS at 08:46

## 2021-01-05 RX ADMIN — DEXTROSE MONOHYDRATE 365 MG: 50 INJECTION, SOLUTION INTRAVENOUS at 09:43

## 2021-01-05 RX ADMIN — PALONOSETRON 0.25 MG: 0.05 INJECTION, SOLUTION INTRAVENOUS at 08:46

## 2021-01-05 NOTE — TELEPHONE ENCOUNTER
----- Message from Eliz Baker RN sent at 1/5/2021 11:13 AM EST -----  Disconnect Thursday at 0930

## 2021-01-06 LAB — BACTERIA SPEC AEROBE CULT: NORMAL

## 2021-01-07 ENCOUNTER — APPOINTMENT (OUTPATIENT)
Dept: ONCOLOGY | Facility: HOSPITAL | Age: 41
End: 2021-01-07

## 2021-01-07 ENCOUNTER — INFUSION (OUTPATIENT)
Dept: ONCOLOGY | Facility: HOSPITAL | Age: 41
End: 2021-01-07

## 2021-01-07 DIAGNOSIS — Z45.2 ENCOUNTER FOR FITTING AND ADJUSTMENT OF VASCULAR CATHETER: ICD-10-CM

## 2021-01-07 DIAGNOSIS — C20 ADENOCARCINOMA OF RECTUM (HCC): Primary | ICD-10-CM

## 2021-01-07 PROCEDURE — 25010000003 HEPARIN LOCK FLUSH PER 10 UNITS: Performed by: INTERNAL MEDICINE

## 2021-01-07 RX ORDER — HEPARIN SODIUM (PORCINE) LOCK FLUSH IV SOLN 100 UNIT/ML 100 UNIT/ML
500 SOLUTION INTRAVENOUS AS NEEDED
Status: DISCONTINUED | OUTPATIENT
Start: 2021-01-07 | End: 2021-01-07 | Stop reason: HOSPADM

## 2021-01-07 RX ORDER — HEPARIN SODIUM (PORCINE) LOCK FLUSH IV SOLN 100 UNIT/ML 100 UNIT/ML
500 SOLUTION INTRAVENOUS AS NEEDED
Status: CANCELLED | OUTPATIENT
Start: 2021-01-07

## 2021-01-07 RX ORDER — SODIUM CHLORIDE 0.9 % (FLUSH) 0.9 %
10 SYRINGE (ML) INJECTION AS NEEDED
Status: DISCONTINUED | OUTPATIENT
Start: 2021-01-07 | End: 2021-01-07 | Stop reason: HOSPADM

## 2021-01-07 RX ORDER — SODIUM CHLORIDE 0.9 % (FLUSH) 0.9 %
10 SYRINGE (ML) INJECTION AS NEEDED
Status: CANCELLED | OUTPATIENT
Start: 2021-01-07

## 2021-01-07 RX ADMIN — Medication 500 UNITS: at 09:31

## 2021-01-07 RX ADMIN — SODIUM CHLORIDE, PRESERVATIVE FREE 10 ML: 5 INJECTION INTRAVENOUS at 09:31

## 2021-01-08 DIAGNOSIS — K52.1 CHEMOTHERAPY INDUCED DIARRHEA: ICD-10-CM

## 2021-01-08 DIAGNOSIS — T45.1X5A CHEMOTHERAPY INDUCED DIARRHEA: ICD-10-CM

## 2021-01-08 RX ORDER — DIPHENOXYLATE HYDROCHLORIDE AND ATROPINE SULFATE 2.5; .025 MG/1; MG/1
1 TABLET ORAL 4 TIMES DAILY PRN
Qty: 120 TABLET | Refills: 1 | Status: SHIPPED | OUTPATIENT
Start: 2021-01-08 | End: 2021-07-23 | Stop reason: SDUPTHER

## 2021-01-12 ENCOUNTER — HOSPITAL ENCOUNTER (OUTPATIENT)
Dept: PET IMAGING | Facility: HOSPITAL | Age: 41
Discharge: HOME | End: 2021-01-12

## 2021-01-12 ENCOUNTER — OFFICE VISIT (OUTPATIENT)
Dept: ONCOLOGY | Facility: CLINIC | Age: 41
End: 2021-01-12

## 2021-01-12 DIAGNOSIS — C20 ADENOCARCINOMA OF RECTUM (HCC): Primary | ICD-10-CM

## 2021-01-12 DIAGNOSIS — C20 ADENOCARCINOMA OF RECTUM (HCC): ICD-10-CM

## 2021-01-12 DIAGNOSIS — C78.00 MALIGNANT NEOPLASM METASTATIC TO LUNG, UNSPECIFIED LATERALITY (HCC): ICD-10-CM

## 2021-01-12 LAB — GLUCOSE BLDC GLUCOMTR-MCNC: 110 MG/DL (ref 70–130)

## 2021-01-12 PROCEDURE — 82962 GLUCOSE BLOOD TEST: CPT

## 2021-01-12 PROCEDURE — A9552 F18 FDG: HCPCS | Performed by: INTERNAL MEDICINE

## 2021-01-12 PROCEDURE — 99443 PR PHYS/QHP TELEPHONE EVALUATION 21-30 MIN: CPT | Performed by: NURSE PRACTITIONER

## 2021-01-12 PROCEDURE — 0 FLUDEOXYGLUCOSE F18 SOLUTION: Performed by: INTERNAL MEDICINE

## 2021-01-12 PROCEDURE — 78815 PET IMAGE W/CT SKULL-THIGH: CPT

## 2021-01-12 RX ADMIN — FLUDEOXYGLUCOSE F18 1 DOSE: 300 INJECTION INTRAVENOUS at 07:32

## 2021-01-12 NOTE — PROGRESS NOTES
____________________PATIENT EDUCATION____________________    PATIENT EDUCATION:  Today I met with the patient to discuss the chemotherapy regimen recommended for treatment of   Encounter Diagnosis   Name Primary?   • Adenocarcinoma of rectum (CMS/HCC) Yes     .  The patient was given explanation of treatment premed side effects including office policy that prohibits patients to drive if sedating medications are administered, MD explanation given regarding benefits, side effects, toxicities and goals of treatment.  The patient received a Chemotherapy/Biotherapy Plan Summary including diagnosis and specific treatment plan.    SIDE EFFECTS:  Common side effects were discussed with the patient and/or significant other.  Discussion included hair loss/discoloration, anemia/fatigue, infection/chills/fever, appetite, bleeding risk/precautions, constipation, diarrhea, mouth sores, taste alteration, loss of appetite,nausea/vomiting, peripheral neuropathy, skin/nail changes, rash, muscle aches/weakness, photosensitivity, weight gain/loss, hearing loss, dizziness, menopausal symptoms, menstrrual irregularity, sterility, high blood pressure, heart damage, liver damage, lung damage, kidney damage, DVT/PE risk, fluid retention, pleural/pericardial effusion, somnolence, electrolyte/LFT imbalance, vein exercises and/or the possible need for vascular access/port placement.  The patient was advice that although uncommon, leakage of an infused medication from the vein or venous access device (port) may lead to skin breakdown and/or other tissue damage.  The patient was advised that he/she may have pain, bleeding, and/or bruising from the insertion of a needle in their vein or venous access device (port).  The patient was further advised that, in spite of proper technique, infection with redness and irritation may rarely occur at the site where the needle was inserted.  The patient was advised that if complications occur, additional  medical treatment is available.    Discussion also included side effects specific to drugs in the treatment plan, specifically Avastin.    PHYSICAL EXAM: Limited due to telephone visit  Patient was alert and oriented to the reason for the phone call.  Asked appropriate questions.  Patient's voice was without hoarseness and no audible respiratory difficulty was noted.    A total of 25 minutes were spent with the patient, with 100% of time spent in education and counseling.    You have chosen to receive care through a telephone visit today. Do you consent to use a telephone visit for your medical care today? Yes     This visit has been rescheduled as a phone visit to comply with patient safety concerns in accordance with CDC recommendations. Total time of discussion was 25 minutes.    97509 is 5-10 minutes  57530 is 11-20 minutes  74019 is 21 or more minutes

## 2021-01-13 ENCOUNTER — READMISSION MANAGEMENT (OUTPATIENT)
Dept: CALL CENTER | Facility: HOSPITAL | Age: 41
End: 2021-01-13

## 2021-01-13 NOTE — OUTREACH NOTE
General Surgery Week 3 Survey      Responses   [facility] patient discharged from?  [facility]   Does the patient have one of the following disease processes/diagnoses(primary or secondary)?  General Surgery   Week 3 attempt successful?  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

## 2021-01-15 ENCOUNTER — OFFICE VISIT (OUTPATIENT)
Dept: INTERNAL MEDICINE | Facility: CLINIC | Age: 41
End: 2021-01-15

## 2021-01-15 VITALS
HEART RATE: 123 BPM | SYSTOLIC BLOOD PRESSURE: 126 MMHG | RESPIRATION RATE: 18 BRPM | OXYGEN SATURATION: 98 % | BODY MASS INDEX: 50.45 KG/M2 | TEMPERATURE: 97.7 F | WEIGHT: 218 LBS | HEIGHT: 55 IN | DIASTOLIC BLOOD PRESSURE: 72 MMHG

## 2021-01-15 DIAGNOSIS — F33.9 MONOPOLAR DEPRESSION (HCC): Primary | ICD-10-CM

## 2021-01-15 DIAGNOSIS — I82.210 THROMBOSIS OF SUPERIOR VENA CAVA (HCC): ICD-10-CM

## 2021-01-15 DIAGNOSIS — F41.9 ANXIETY: ICD-10-CM

## 2021-01-15 DIAGNOSIS — Z79.01 CHRONIC ANTICOAGULATION: Chronic | ICD-10-CM

## 2021-01-15 DIAGNOSIS — C18.9 MALIGNANT NEOPLASM OF COLON, UNSPECIFIED PART OF COLON (HCC): Chronic | ICD-10-CM

## 2021-01-15 PROBLEM — R00.0 TACHYCARDIA: Chronic | Status: ACTIVE | Noted: 2020-12-23

## 2021-01-15 PROCEDURE — 99214 OFFICE O/P EST MOD 30 MIN: CPT | Performed by: NURSE PRACTITIONER

## 2021-01-15 RX ORDER — CLONAZEPAM 1 MG/1
1 TABLET ORAL 3 TIMES DAILY PRN
Qty: 90 TABLET | Refills: 0 | Status: SHIPPED | OUTPATIENT
Start: 2021-01-15 | End: 2021-02-18

## 2021-01-15 RX ORDER — ESCITALOPRAM OXALATE 10 MG/1
10 TABLET ORAL DAILY
Qty: 30 TABLET | Refills: 1 | Status: SHIPPED | OUTPATIENT
Start: 2021-01-15 | End: 2021-03-16

## 2021-01-15 NOTE — PROGRESS NOTES
Chief Complaint  Anxiety (3 month follow up )    Subjective:      History of Present Illness {CC  Problem List  Visit  Diagnosis   Encounters  Notes  Medications  Labs  Result Review Imaging  Media :23}     Carole Weaver presents to [facility] for chronic medical conditions including: Rectal cancer, peripheral neuropathy, chronic anticoagulation (hetero Factor V Leiden), hypertension, anxiety, depression      LV: 10/9/2020 (note reviewed)   Since LV - UTI  Tx with bactrim (resolved)   She had had a new L LL pulmonary nodule. PET scan 9/18/20 showed metastatic disease. She has been on palliative FOLFIRI.     12/21/20 - she went to ER for swelling in her neck and SOA. Her port was changed from L to R side shortly before this on 12/18/2020. She had a thrombosis in SVC and L brachiocephalic vein. Tx with IV heparin. (she was previously on xarelto). She was discharged on lovenox and continues currently q 12 hours.     Resumed chemo 2 wks ago - per patient next Tuesday will also start avastin. States tolerating well.        Pet scan 1/12/21 - some spots smaller, uptake of RUL was decreased. No new pulmonary nodules.    She continues to smoke. When she had new dx of metastatic dz her and her father both quit smoking (he as show of support) - she had quit briefly but now back to smoking about  3 cig daily. I've encouraged her to stop, at this point she's not ready.     She was seen by oncology therapist, ELIJAH Rey.  Lexapro was D/C'd.  Started remoron.  For CORRINA, depression.   She states she has been gaining weight.  Doesn't feel it was as effective as lexapro and would like to restart lexapro.   She continues klonopin as needed and it is effective.  She is rarely using norco. Pain is mostly controlled.  Discussed not taking together to prevent over sedation.     She had been having some abdominal discomfort.  She was changed from pepcid to prilosec and states it has  improved.     I had not seen her since her discharge - we did discuss her rx and they were correct.   Current outpatient and discharge medications have been reconciled for the patient.  Reviewed by: TRINY Worley III       Objective:      Physical Exam  Vitals signs reviewed.   Constitutional:       Appearance: She is well-developed.   HENT:      Head: Normocephalic.      Comments: Wearing mask due to COVID   Neck:      Musculoskeletal: Normal range of motion and neck supple.      Thyroid: No thyromegaly.   Cardiovascular:      Rate and Rhythm: Regular rhythm. Tachycardia present.      Pulses: Normal pulses.      Heart sounds: Normal heart sounds.   Pulmonary:      Effort: Pulmonary effort is normal.      Breath sounds: Normal breath sounds.      Comments: E/U   Abdominal:      General: Bowel sounds are normal.      Palpations: Abdomen is soft.   Lymphadenopathy:      Cervical: No cervical adenopathy.   Skin:     General: Skin is warm and dry.      Capillary Refill: Capillary refill takes 2 to 3 seconds.      Comments: Bruising around neck - improved per patient   Neurological:      Mental Status: She is alert and oriented to person, place, and time.   Psychiatric:         Mood and Affect: Mood is depressed. Affect is tearful.         Behavior: Behavior normal. Behavior is cooperative.         Thought Content: Thought content normal.         Judgment: Judgment normal.        Result Review  Data Reviewed:{ Labs  Result Review  Imaging  Med Tab  Media :23}   The following data was reviewed by: Deepika Vela III, NP-C on 01/15/2021  Lab Results - Last 18 Months   Lab Units 01/05/21  0727 12/29/20  0731 12/24/20  0532  12/21/20  1038 12/21/20  1029  11/16/20  0903  08/12/20  1758   BUN mg/dL 9 8 8   < >  --  9   < > 9   < > 5*   CREATININE mg/dL 0.78 0.75 0.57   < >  --  0.85   < > 0.70   < > 0.59   EGFR IF NONAFRICN AM mL/min/1.73 81 85 117   < >  --  74   < > 92   < > 112   SODIUM  "mmol/L 139 140 136   < >  --  134*   < > 135*   < > 132*   POTASSIUM mmol/L 4.3 4.0 3.6   < >  --  4.2   < > 4.3   < > 3.5   CHLORIDE mmol/L 104 105 100   < >  --  99   < > 103   < > 97*   CALCIUM mg/dL 9.3 8.9 8.7   < >  --  9.2   < > 8.8   < > 8.4*   ALBUMIN g/dL 3.50 3.30*  --   --   --  3.50   < > 3.70   < > 2.90*   BILIRUBIN mg/dL 0.2 0.2  --   --   --  0.8   < > <0.2   < > 0.4   ALK PHOS U/L 103 95  --   --   --  115   < > 115   < > 96   AST (SGOT) U/L 19 20  --   --   --  28   < > 14   < > 17   ALT (SGPT) U/L 28 28  --   --   --  29   < > 30   < > 19   WBC 10*3/mm3 5.22 5.39 3.88   < >  --   --    < > 2.98*   < > 12.38*   RBC 10*6/mm3 4.28 3.85 3.64*   < >  --   --    < > 4.24   < > 3.32*   HEMATOCRIT % 42.2 37.8 34.3   < >  --   --    < > 38.0   < > 32.5*   MCV fL 98.6* 98.2* 94.2   < >  --   --    < > 89.6   < > 97.9*   MCH pg 31.5 31.7 31.6   < >  --   --    < > 30.2   < > 32.2   INR   --   --   --   --  1.40*  --   --  1.51*  --  1.38*    < > = values in this interval not displayed.        Vital Signs:   /72   Pulse (!) 123   Temp 97.7 °F (36.5 °C) (Skin)   Resp 18   Ht 65.4 cm (25.73\")   Wt 98.9 kg (218 lb)   SpO2 98%   .55 kg/m²          Assessment and Plan:      Assessment and Plan {CC Problem List  Visit Diagnosis  ROS  Review (Popup)  Health Maintenance  Quality  BestPractice  Medications  SmartSets  SnapShot Encounters  Media :23}     Problem List Items Addressed This Visit        Coag and Thromboembolic    Chronic anticoagulation (Chronic)    Thrombosis of superior vena cava (CMS/HCC)       Hematology and Neoplasia    Malignant neoplasm of colon (CMS/HCC) (Chronic)       Mental Health    Anxiety (Chronic)    Current Assessment & Plan     Stable on klonopin. Refill today.          Relevant Medications    clonazePAM (KlonoPIN) 1 MG tablet    Monopolar depression (CMS/HCC) - Primary (Chronic)    Current Assessment & Plan     Psychological condition is worsening.   She " requests to restart lexparo.  Will stop remeron.   She will notify me if not improved before next visit.          Relevant Medications    escitalopram (Lexapro) 10 MG tablet        Continue FU with oncology. Discussed smoking cessation - she isn't ready. Aware of risks as she hypercoagulable.  Pain controlled.     Follow Up {Instructions Charge Capture  Follow-up Communications :23}   Return in about 3 months (around 4/15/2021).  Patient was given instructions and counseling regarding her condition or for health maintenance advice. Please see specific information pulled into the AVS if appropriate.    Dragon disclaimer:   Much of this encounter note is an electronic transcription/translation of spoken language to printed text. The electronic translation of spoken language may permit erroneous, or at times, nonsensical words or phrases to be inadvertently transcribed; Although I have reviewed the note for such errors, some may still exist.     Additional Patient Counseling:       Patient Instructions   Decrease her Remeron to 7.5 mg, which is one half dose for 2 nights.  Then resume Lexapro on the following day.

## 2021-01-15 NOTE — PATIENT INSTRUCTIONS
Decrease her Remeron to 7.5 mg, which is one half dose for 2 nights.  Then resume Lexapro on the following day.

## 2021-01-17 PROBLEM — C18.9 MALIGNANT NEOPLASM OF COLON: Chronic | Status: ACTIVE | Noted: 2021-01-15

## 2021-01-17 PROBLEM — F33.9 MONOPOLAR DEPRESSION: Chronic | Status: ACTIVE | Noted: 2019-05-09

## 2021-01-17 PROBLEM — C20 ADENOCARCINOMA OF RECTUM: Chronic | Status: ACTIVE | Noted: 2019-07-12

## 2021-01-17 PROBLEM — D68.51 HETEROZYGOUS FACTOR V LEIDEN MUTATION: Chronic | Status: ACTIVE | Noted: 2019-08-02

## 2021-01-17 NOTE — ASSESSMENT & PLAN NOTE
Psychological condition is worsening.   She requests to restart lexparo.  Will stop remeron.   She will notify me if not improved before next visit.

## 2021-01-19 ENCOUNTER — OFFICE VISIT (OUTPATIENT)
Dept: ONCOLOGY | Facility: CLINIC | Age: 41
End: 2021-01-19

## 2021-01-19 ENCOUNTER — INFUSION (OUTPATIENT)
Dept: ONCOLOGY | Facility: HOSPITAL | Age: 41
End: 2021-01-19

## 2021-01-19 VITALS
RESPIRATION RATE: 16 BRPM | OXYGEN SATURATION: 95 % | TEMPERATURE: 97.7 F | DIASTOLIC BLOOD PRESSURE: 99 MMHG | HEIGHT: 65 IN | HEART RATE: 118 BPM | WEIGHT: 212.2 LBS | BODY MASS INDEX: 35.35 KG/M2 | SYSTOLIC BLOOD PRESSURE: 135 MMHG

## 2021-01-19 DIAGNOSIS — K61.1 PERIRECTAL ABSCESS: ICD-10-CM

## 2021-01-19 DIAGNOSIS — I82.210 THROMBOSIS OF SUPERIOR VENA CAVA (HCC): ICD-10-CM

## 2021-01-19 DIAGNOSIS — C20 ADENOCARCINOMA OF RECTUM (HCC): Primary | ICD-10-CM

## 2021-01-19 DIAGNOSIS — C78.00 MALIGNANT NEOPLASM METASTATIC TO LUNG, UNSPECIFIED LATERALITY (HCC): ICD-10-CM

## 2021-01-19 DIAGNOSIS — I26.99 OTHER PULMONARY EMBOLISM WITHOUT ACUTE COR PULMONALE, UNSPECIFIED CHRONICITY (HCC): ICD-10-CM

## 2021-01-19 DIAGNOSIS — D68.51 HETEROZYGOUS FACTOR V LEIDEN MUTATION (HCC): Chronic | ICD-10-CM

## 2021-01-19 DIAGNOSIS — Z79.01 ANTICOAGULATION ADEQUATE: ICD-10-CM

## 2021-01-19 DIAGNOSIS — C20 ADENOCARCINOMA OF RECTUM (HCC): ICD-10-CM

## 2021-01-19 DIAGNOSIS — G62.0 DRUG-INDUCED PERIPHERAL NEUROPATHY (HCC): ICD-10-CM

## 2021-01-19 LAB
ALBUMIN SERPL-MCNC: 3.9 G/DL (ref 3.5–5.2)
ALBUMIN/GLOB SERPL: 1.1 G/DL (ref 1.1–2.4)
ALP SERPL-CCNC: 102 U/L (ref 38–116)
ALT SERPL W P-5'-P-CCNC: 56 U/L (ref 0–33)
ANION GAP SERPL CALCULATED.3IONS-SCNC: 11.3 MMOL/L (ref 5–15)
AST SERPL-CCNC: 33 U/L (ref 0–32)
BASOPHILS # BLD AUTO: 0.02 10*3/MM3 (ref 0–0.2)
BASOPHILS NFR BLD AUTO: 0.5 % (ref 0–1.5)
BILIRUB SERPL-MCNC: 0.4 MG/DL (ref 0.2–1.2)
BILIRUB UR QL STRIP: ABNORMAL
BUN SERPL-MCNC: 8 MG/DL (ref 6–20)
BUN/CREAT SERPL: 9.9 (ref 7.3–30)
CALCIUM SPEC-SCNC: 9.7 MG/DL (ref 8.5–10.2)
CHLORIDE SERPL-SCNC: 103 MMOL/L (ref 98–107)
CLARITY UR: CLEAR
CO2 SERPL-SCNC: 23.7 MMOL/L (ref 22–29)
COLOR UR: YELLOW
CREAT SERPL-MCNC: 0.81 MG/DL (ref 0.6–1.1)
DEPRECATED RDW RBC AUTO: 59.7 FL (ref 37–54)
EOSINOPHIL # BLD AUTO: 0.17 10*3/MM3 (ref 0–0.4)
EOSINOPHIL NFR BLD AUTO: 4.3 % (ref 0.3–6.2)
ERYTHROCYTE [DISTWIDTH] IN BLOOD BY AUTOMATED COUNT: 16.3 % (ref 12.3–15.4)
GFR SERPL CREATININE-BSD FRML MDRD: 78 ML/MIN/1.73
GLOBULIN UR ELPH-MCNC: 3.5 GM/DL (ref 1.8–3.5)
GLUCOSE SERPL-MCNC: 110 MG/DL (ref 74–124)
GLUCOSE UR STRIP-MCNC: NEGATIVE MG/DL
HCT VFR BLD AUTO: 43.8 % (ref 34–46.6)
HGB BLD-MCNC: 14.2 G/DL (ref 12–15.9)
HGB UR QL STRIP.AUTO: ABNORMAL
IMM GRANULOCYTES # BLD AUTO: 0.01 10*3/MM3 (ref 0–0.05)
IMM GRANULOCYTES NFR BLD AUTO: 0.3 % (ref 0–0.5)
KETONES UR QL STRIP: NEGATIVE
LEUKOCYTE ESTERASE UR QL STRIP.AUTO: ABNORMAL
LYMPHOCYTES # BLD AUTO: 0.7 10*3/MM3 (ref 0.7–3.1)
LYMPHOCYTES NFR BLD AUTO: 17.6 % (ref 19.6–45.3)
MCH RBC QN AUTO: 32.1 PG (ref 26.6–33)
MCHC RBC AUTO-ENTMCNC: 32.4 G/DL (ref 31.5–35.7)
MCV RBC AUTO: 98.9 FL (ref 79–97)
MONOCYTES # BLD AUTO: 0.36 10*3/MM3 (ref 0.1–0.9)
MONOCYTES NFR BLD AUTO: 9 % (ref 5–12)
NEUTROPHILS NFR BLD AUTO: 2.72 10*3/MM3 (ref 1.7–7)
NEUTROPHILS NFR BLD AUTO: 68.3 % (ref 42.7–76)
NITRITE UR QL STRIP: NEGATIVE
NRBC BLD AUTO-RTO: 0 /100 WBC (ref 0–0.2)
PH UR STRIP.AUTO: 5.5 [PH] (ref 4.5–8)
PLATELET # BLD AUTO: 229 10*3/MM3 (ref 140–450)
PMV BLD AUTO: 8.7 FL (ref 6–12)
POTASSIUM SERPL-SCNC: 4 MMOL/L (ref 3.5–4.7)
PROT SERPL-MCNC: 7.4 G/DL (ref 6.3–8)
PROT UR QL STRIP: ABNORMAL
RBC # BLD AUTO: 4.43 10*6/MM3 (ref 3.77–5.28)
SODIUM SERPL-SCNC: 138 MMOL/L (ref 134–145)
SP GR UR STRIP: >=1.03 (ref 1–1.03)
UROBILINOGEN UR QL STRIP: ABNORMAL
WBC # BLD AUTO: 3.98 10*3/MM3 (ref 3.4–10.8)

## 2021-01-19 PROCEDURE — 25010000002 LEUCOVORIN CALCIUM PER 50 MG: Performed by: INTERNAL MEDICINE

## 2021-01-19 PROCEDURE — 25010000002 FOSAPREPITANT PER 1 MG: Performed by: INTERNAL MEDICINE

## 2021-01-19 PROCEDURE — 96368 THER/DIAG CONCURRENT INF: CPT

## 2021-01-19 PROCEDURE — 25010000002 ATROPINE PER 0.01 MG: Performed by: INTERNAL MEDICINE

## 2021-01-19 PROCEDURE — 96413 CHEMO IV INFUSION 1 HR: CPT

## 2021-01-19 PROCEDURE — 25010000002 FLUOROURACIL PER 500 MG: Performed by: INTERNAL MEDICINE

## 2021-01-19 PROCEDURE — 81003 URINALYSIS AUTO W/O SCOPE: CPT

## 2021-01-19 PROCEDURE — 96367 TX/PROPH/DG ADDL SEQ IV INF: CPT

## 2021-01-19 PROCEDURE — 80053 COMPREHEN METABOLIC PANEL: CPT

## 2021-01-19 PROCEDURE — 96415 CHEMO IV INFUSION ADDL HR: CPT

## 2021-01-19 PROCEDURE — 96375 TX/PRO/DX INJ NEW DRUG ADDON: CPT

## 2021-01-19 PROCEDURE — 96416 CHEMO PROLONG INFUSE W/PUMP: CPT

## 2021-01-19 PROCEDURE — 25010000002 PALONOSETRON PER 25 MCG: Performed by: INTERNAL MEDICINE

## 2021-01-19 PROCEDURE — 25010000002 IRINOTECAN PER 20 MG: Performed by: INTERNAL MEDICINE

## 2021-01-19 PROCEDURE — 99215 OFFICE O/P EST HI 40 MIN: CPT | Performed by: INTERNAL MEDICINE

## 2021-01-19 PROCEDURE — 85025 COMPLETE CBC W/AUTO DIFF WBC: CPT

## 2021-01-19 PROCEDURE — 25010000002 DEXAMETHASONE SODIUM PHOSPHATE 100 MG/10ML SOLUTION: Performed by: INTERNAL MEDICINE

## 2021-01-19 RX ORDER — SODIUM CHLORIDE 9 MG/ML
250 INJECTION, SOLUTION INTRAVENOUS ONCE
Status: CANCELLED | OUTPATIENT
Start: 2021-01-19

## 2021-01-19 RX ORDER — PALONOSETRON 0.05 MG/ML
0.25 INJECTION, SOLUTION INTRAVENOUS ONCE
Status: COMPLETED | OUTPATIENT
Start: 2021-01-19 | End: 2021-01-19

## 2021-01-19 RX ORDER — ATROPINE SULFATE 1 MG/ML
0.25 INJECTION, SOLUTION INTRAMUSCULAR; INTRAVENOUS; SUBCUTANEOUS
Status: CANCELLED | OUTPATIENT
Start: 2021-01-19

## 2021-01-19 RX ORDER — SODIUM CHLORIDE 9 MG/ML
250 INJECTION, SOLUTION INTRAVENOUS ONCE
Status: COMPLETED | OUTPATIENT
Start: 2021-01-19 | End: 2021-01-19

## 2021-01-19 RX ORDER — PALONOSETRON 0.05 MG/ML
0.25 INJECTION, SOLUTION INTRAVENOUS ONCE
Status: CANCELLED | OUTPATIENT
Start: 2021-01-19

## 2021-01-19 RX ORDER — ATROPINE SULFATE 0.4 MG/ML
0.25 AMPUL (ML) INJECTION
Status: DISCONTINUED | OUTPATIENT
Start: 2021-01-19 | End: 2021-01-19 | Stop reason: HOSPADM

## 2021-01-19 RX ADMIN — ATROPINE SULFATE 0.25 MG: 0.4 INJECTION, SOLUTION INTRAMUSCULAR; INTRAVENOUS; SUBCUTANEOUS at 10:44

## 2021-01-19 RX ADMIN — SODIUM CHLORIDE 810 MG: 900 INJECTION, SOLUTION INTRAVENOUS at 10:46

## 2021-01-19 RX ADMIN — SODIUM CHLORIDE 250 ML: 9 INJECTION, SOLUTION INTRAVENOUS at 10:00

## 2021-01-19 RX ADMIN — DEXTROSE MONOHYDRATE 365 MG: 50 INJECTION, SOLUTION INTRAVENOUS at 10:46

## 2021-01-19 RX ADMIN — FLUOROURACIL 4850 MG: 50 INJECTION, SOLUTION INTRAVENOUS at 12:20

## 2021-01-19 RX ADMIN — DEXAMETHASONE SODIUM PHOSPHATE 12 MG: 10 INJECTION, SOLUTION INTRAMUSCULAR; INTRAVENOUS at 10:29

## 2021-01-19 RX ADMIN — SODIUM CHLORIDE 100 ML: 9 INJECTION, SOLUTION INTRAVENOUS at 10:00

## 2021-01-19 RX ADMIN — PALONOSETRON 0.25 MG: 0.05 INJECTION, SOLUTION INTRAVENOUS at 10:00

## 2021-01-20 NOTE — PROGRESS NOTES
REASON FOR FOLLOW UP:     1. Rectal cancer, rectal ultrasound examination in July 2019 reported T3N0 disease without lymphadenopathy. She does have small pulmonary nodule 6-7 mm and 2 mm with indeterminate feature. There is no solid evidence of metastatic disease otherwise. Patient has stage IIA (T3N0M0) disease.  2. The patient is heterozygous factor V Leiden, was on prophylactic anticoagulation with Lovenox 40 mg daily given her increased risk of thrombosis with MediPort and GI malignancy.   3. PET scan on 8/8/2019 reported an 8 mm hypermetabolic right upper lobe pulmonary nodule, which is suspicious for metastatic as well.    4. Patient had a PowerPort placement on the left upper chest by Dr. Joseph on 8/9/2019.  5. Patient was started on concurrent infusional 5-FU chemoradiation therapy on 8/12/2019, with planned complete surgical resection and further adjuvant chemotherapy with intention to cure the disease.   6. Patient underwent surgical resection of the primary rectal cancer by Dr. Ye on 12/2/2019.  Pathology evaluation reported residual T3N1 disease stage IIIb.  7. Diarrhea related to therapy and radiation.   8. Patient was started cycle 1 day 1 of adjuvant FOLFOX 6 on 1/23/2020.  9. On 2/5/2020 FOLFOX 6 cycle 2 delayed secondary to neutropenia.  Patient was given 3 days of Granix injection.  After cycle #2 we planned 3 days of Granix with each cycle.   10. Patient also intolerant of oral iron.  Patient received 2 doses of IV injectafer on 02/05/2020 and 02/12/2020.   11. 02/12/2020 Proceed with cycle #2 of FOLFOX 6 with 3 days of Granix.  12. On 3/11/2020 cycle 4 postponed secondary to abdominal pain and occasional low-grade fevers.  CT scans ordered.  13. Cycle #4 resumed after CT scan revealed no evidence of disease.  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 Ye. patient has no fever.  Continue to observe.   14. Grade 3  hand-foot syndrome secondary to 5-FU after cycle #7 FOLFOX 6 chemotherapy, prompting ER visit.  Also has worsening peripheral neuropathy.   15. Cycle #8 FOLFOX 6 was given on 5/13/2020, with 50% dose reduction for both 5-FU and oxaliplatin, and also examination of bolus 5-FU.   16. PET scan examination on 5/21/2020 reported no evidence of metastatic disease in the chest abdomen pelvis.  Stable 6 mm RUL pulmonary nodule.  17. On 5/27/2020, I discussed with the patient that we can discontinue chemotherapy at this time.   18. Patient had a surgical procedure for low anterior colon resection, coloanal anastomosis on 8/3/2020.  19. CT scan for chest abdomen pelvis on 9/9/2020 reported a new 8 mm noncalcified pulmonary nodule in the left lower lobe of the lung.  Otherwise stable right upper lobe 6 mm pulmonary nodule, and no evidence of disease recurrence in the abdomen or pelvis.  20. PET scan examination on 9/18/2020 reported multiple hypermetabolic small pulmonary nodules/ new pulmonary nodules.   21. Patient was started on pelvic chemotherapy with FOLFIRI regimen on 10/13/2020.   22. 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 It reported tumor mutation burden 5 Muts/Mb, microsatellite stable, TP53 mutation R282W, and others.   23. Cycle #5 was without irinotecan, due to peripheral neuropathy.  24. 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  Patient was switched to weight-based Lovenox injection.  25. Cycle #6 5-FU and irinotecan was delayed by 2 weeks because of the above incident.    HISTORY OF PRESENT ILLNESS:  The patient is a 40 y.o. female with the above-mentioned history is here today for reevaluation, prior to resume chemotherapy cycle #6.      She recently had a hospitalization from 12/21/2020 through 12/24/2020 with a new thrombus of the superior vena cava which developed after a  new PowerPort catheter placement while the patient was on Xarelto.  Patient had a new port placed 12/18/2020, and had held her Xarelto for 2 days prior to surgery.  She presented to the ER on 12/21/20 with complaints of facial and arm swelling for 3 days.  She also noted increased shortness of breath.  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  Thrombus within the right internal jugular vein cannot be excluded.  Patient was started on IV heparin, and eventually transitioned to weight-based Lovenox, which she now continues.    Patient reports her facial swelling and swelling on her right arm is being better.  Her breathing also has improved.  She continues on Lovenox, with no bleeding or bruising.  She is more energetic, performance status ECOG 2.    Patient reports no fever sweating chills.  Appetite is reasonable.     Patient reports neuropathy has improved, she only has very mild numbness tingling involving the tip of fingers and also the toes.    Patient reports she was just seen by her surgeon Dr. Esparza yesterday, her abdomen wound has completely healed.  There is planning for possible reversal of ileostomy in about 3 months.        Past Medical History:   Diagnosis Date   • Abdominopelvic abscess (CMS/HCC) 08/12/2020    ADMITTED TO [facility]   • Abnormal Pap smear of cervix 02/02/1998    JULIUS I   • Anemia in pregnancy    • Anxiety    • Bilateral epiphora 04/2020   • Bilateral hand swelling 05/02/2020    SEEN AT [facility] ER   • Cervical lymphadenitis 06/06/2012    SEEN AT [facility] ER   • Chemotherapy induced neutropenia (CMS/HCC) 04/2020   • Chemotherapy-induced nausea 02/2020   • Chemotherapy-induced thrombocytopenia 05/2020   • Chronic diarrhea    • Colon polyps     FOLLOWED BY DR. GERONIMO ESPARZA   • Cystitis 04/24/2020    WITH DEHYDRATION, ADMITTED TO [facility]   • Cystitis 10/27/2020   • Drug-induced peripheral neuropathy (CMS/HCC) 05/2020   • Fistula of intestine    • GERD (gastroesophageal reflux  disease)    • Hand foot syndrome 2020   • Heart murmur     IN CHILDHOOD   • Hematochezia    • Hemorrhoids    • Heterozygous factor V Leiden mutation (CMS/HCC)     DX 2019   • History of anemia    • History of chemotherapy     FOLLOWED BY DR. ALEXANDRU HUNT   • History of gestational diabetes    • History of pre-eclampsia    • History of radiation therapy     FOLLOWED BY DR. JAVON LEWIS   • History of TB skin testing 2009    TB Skin Test   • HPV in female    • Hypokalemia 2019   • Hypomagnesemia 2019   • IBS (irritable bowel syndrome)    • Ileostomy in place (CMS/HCC)     FOLLOWED BY DR. GERONIMO YE   • Infected insect bite of neck 2016    SEEN AT [facility]   • Kidney stones 2007    SEEN AT [facility] ER   • Lump of right breast     SWOLLEN LYMPH NODE   • Lung cancer (CMS/HCC) 2020    METASTATIC LUNG CANCER   • Monopolar depression (CMS/HCC)    • On anticoagulant therapy    • Perirectal abscess 2020   • PIH (pregnancy induced hypertension)    • Pulmonary embolism without acute cor pulmonale (CMS/HCC) 09/20/2019    X 3; ADMITTED TO [facility]   • Pulmonary nodule, right 2020   • Rectal cancer (CMS/HCC) 2019    STAGE IIA, INVASIVE MODERATELY DIFFERENTIATED ADENOCARCINOMA, CHEMO AND XRT FINISHED 2019   • Right shoulder pain 2020    SEEN AT [facility] ER   • Right ureteral stone 10/01/2019    SEEN AT [facility] ER   • SAB (spontaneous ) 1996     A2-1 INDUCED   • Sciatica    • Sepsis due to cellulitis (CMS/HCC) 2002    LEFT GREAT TOE, ADMITTED TO [facility]   • Tachycardia 2020   • Urinary urgency 2020     Past Surgical History:   Procedure Laterality Date   • CHOLECYSTECTOMY N/A 10/10/2003    LAPAROSCOPIC WITH CHOLANGIOGRAM, DR. JAMEY TALAVERA AT [facility]   • COLON RESECTION N/A 2019    Procedure: laparoscopic low anterior colon resection with mobilization of splenic flexure and diverting loop ileostomy: ERAS;  Surgeon: Geronimo Ye MD;   Location: [facility] MAIN OR;  Service: General   • COLON RESECTION N/A 8/3/2020    Procedure: LOW ANTERIOR COLON RESECTION, COLOANAL ANASTOMOSIS, MOBILIZATION SPLENIC FLEXURE;  Surgeon: Geronimo Ye MD;  Location: [facility] MAIN OR;  Service: General;  Laterality: N/A;   • COLONOSCOPY N/A 7/15/2020    PATENT ANASTAMOSIS IN MID RECTUM, RESCOPE IN 1 YR, DR. GERONIMO YE AT [facility]   • COLONOSCOPY W/ POLYPECTOMY N/A 07/08/2019    15 MM TUBULOVILLOUS ADENOMA POLYP IN HEPATIC FLEXURE, 20 MMTUBULOVILLOUS ADENOMA WITH HIGH GRADE DYSPLASIA IN RECTOSIGMOID, 4 CM MASS IN MID RECTUM, PATH: INVASIVE MODERATELY DIFFERENTIATED ADENOCARCINOMA, DR. JENNIFER LI AT [facility]   • DILATATION AND EVACUATION N/A 2009   • ENDOSCOPY N/A 07/08/2019    LA GRADE A ESOPHAGITIS, GASTRITIS, ALL BIOPSIES BENIGN, DR. JENNIFER LI AT [facility]   • INCISION AND DRAINAGE PERIRECTAL ABSCESS N/A 8/14/2020    Procedure: INCISION AND DRAINAGE OF retrorectal dehiscence abcess with drain placement and irrigation;  Surgeon: Geronimo Ye MD;  Location: [facility] OR;  Service: General;  Laterality: N/A;   • PAP SMEAR N/A 01/24/2014   • SIGMOIDOSCOPY N/A 7/24/2019    INFILTRATIVE PARTIALLY OBSTRUCTING LARGE RECTAL CANCER, AREA TATOOED, DR. GERONIMO YE AT [facility]   • SIGMOIDOSCOPY N/A 11/23/2019    AN ULCERATED PARTIALLY OBSTRUCTING MASS IN MID RECTUM, AREA TATTOOED, DR. GERONIMO YE AT [facility]   • TRANSRECTAL ULTRASOUND N/A 7/24/2019    Procedure: ULTRASOUND TRANSRECTAL;  Surgeon: Geronimo Ye MD;  Location: [facility] ENDOSCOPY;  Service: General   • URETEROSCOPY LASER LITHOTRIPSY WITH STENT INSERTION Right 10/30/2019    DR. ESTUARDO BEASLEY AT [facility]   • VAGINAL DELIVERY N/A 09/18/1998   • VENOUS ACCESS DEVICE (PORT) INSERTION Left 8/9/2019    Procedure: INSERTION VENOUS ACCESS DEVICE;  Surgeon: Sj Joseph MD;  Location: [facility] OR [facility];  Service: General   • VENOUS ACCESS DEVICE (PORT) INSERTION N/A 12/18/2020    Procedure: INSERTION  VENOUS ACCESS DEVICE right side, removal venous access device left side;  Surgeon: Sj Joseph MD;  [place] [facility] [place] [facility]  Service: General;  Laterality: N/A;   • WISDOM TOOTH EXTRACTION Bilateral 1993       HEMATOLOGIC/ONCOLOGIC HISTORY:      The patient reports she has intermittent rectal bleeding and urgency, mucous with her stool, starting sometime in 2016. At that time she was referred to GI service, and was diagnosed of irritable bowel syndrome. Nevertheless she had worsening urgency for bowel movement, and had a couple of incidents losing control of stool. She was recently seen by Roland Thorpe MD again and had colonoscopy and EGD exam on 07/08/2019. She was found having a circumferential rectal mass. According to the procedure note, the patient had a fungating circumferential bleeding 4 cm mass in the middle rectum, at distance between 13 cm and 17 cm from the anus. Mass was causing partial obstruction. There were also colon polyps found at the hepatic flexure and also at the rectosigmoid junction 23 cm from the anus. Both were resected and retrieved. EGD examination reported grade A esophagitis at the GE junction and patchy discontinuous erythema and aggravation of the mucosa without bleeding in the stomach body. There is normal mucosa of the duodenum. Pathology evaluation from this procedure reported moderately differentiated adenocarcinoma involving the rectal mass. The rectal sigmoid junction polyp was tubular/tubulovillous adenoma with high grade dysplasia negative for invasive malignancy. The hepatic flexure polyp was also tubular/tubulovillous adenoma negative for high grade dysplasia or malignancy. The biopsy from the esophagus reports squamocolumnar mucosa with inflammatory changes suggestive of mild reflux esophagitis, negative for interstitial metaplasia. Gastric biopsy was benign and duodenal biopsy was also benign. There is no mention of H-pylori.     Patient was subsequently referred to  colorectal surgeon Padmaja Ye MD for further evaluation. The patient had CT scan examination for chest, abdomen and pelvis requested by Dr. Ye and were done on 07/13/2019. The study reported no evidence of lymphadenopathy in the abdomen and pelvis. There was wall thickening of the rectosigmoid junction. Normal spleen, pancreas, adrenal glands and kidneys. There was fatty liver infiltration but no focal lymphatic lesions. There was a small 6-7 mm noncalcified nodule in the right upper lobe and a tiny 3 mm subpleural nodule in the right middle lobe. No mediastinal or hilar lymphadenopathy.     Dr. Ye performed staging rectal ultrasound examination. This study reported tumor penetrating through the muscularis propria as T3 disease; there were no lymph nodes identified.    She had a hospitalization in late September 2019 because of newly discovered pulmonary emboli.  She was on prophylactic Lovenox prior to the incident of PE.  Now she is on full dose Xarelto anticoagulation.  Patient reports no further chest pain dyspnea.  She denies bleeding or bruising.  During her hospitalization, she was seen by Dr. Ye, who plans to have surgery 8 to 12 weeks after finishing radiation therapy.  She finished her radiation on 9/19/2019.     I noticed patient also presented to the emergency room on 10/1/2019 complaining of right flank area pain.  I reviewed the images studies and indeed she has a very small 1 to 2 mm obstructing kidney stone in the UV junction.  Patient is still symptomatic with some pains and dysuria.  She denies fever sweating or chills.    Laboratory study on 10/7/2019 reported normal CBC including hemoglobin 13.1, platelets 301,000, WBC 6170 and ANC 4900 lymphocytes 590 monocytes 480.      The nurse reported malfunction of port-a-catheter on 10/7/2019.  Port study on 10/8/2019 showed fibrin sheath around the distal aspect of the Mediport catheter in the SVC. This does not appear to hinder injection,  but does prevent aspiration at this time.    Patient underwent surgical resection of the colon on 12/2/2019 with Dr. Ye.  Pathology evaluation reported residual T3 disease, also 1 out of 14 lymph nodes positive for malignancy.  So this patient in either had at least stage IIIb disease (T3 N1 M0?).      Adjuvant chemotherapy FOLFOX 6 will be started on 1/22/2020.  Laboratory study reported iron saturation 10%, free iron 31 TIBC 319 and ferritin 168.  Her hemoglobin was 11.8, WBC 5600, and platelets 347,000.    Patient was here on 02/12/2020 for cycle 2 of her FOLFOX.  This is delayed x1 week secondary to neutropenia.  The patient ultimately received 3 days worth of Neupogen with recovery of her blood counts.  Of note, the patient struggled with significant nausea without any episodes of vomiting following cycle #1 of chemotherapy resulting in significant weight loss.  She is up 12 pounds over the last week as her appetite has normalized.  We will add Emend to her care plan.    She is having several loose stools per her colostomy and has been hesitant to take Imodium due to prior history of constipation.  I encouraged her to try this with a maximum of 8 tablets/day.  She denies any infectious symptoms including fevers or chills.  No excess bleeding or bruising noted.  She had the expected cold sensitivity related to the Oxaliplatin for about 3 days following treatment.    Labs from 02/12/2020 demonstrated total white blood cell count of 5.14, ANC of 3.06, hemoglobin of 11.2, platelets of 211,000.  She was found to be iron deficient last week and is intolerant to oral iron secondary to GI distress.  For this reason, she initiated IV iron therapy with Injectafer last week.  She had received her second dose 02/12/2020    Patient has normalized hemoglobin 12.2 on 2/26/2020.    She reported on 5/5/2020 she had a recent visit to the emergency department for what was suspected to be an allergic reaction.  She called our  on-call service on Saturday with reports of hand and face swelling.  She was instructed to proceed to the emergency department at which time she was assessed and prescribed a Medrol Dosepak.  She had just completed her 5-FU and was unhooked on Friday, 5/3/2020.  Her symptoms have improved.  She does report persistent hyperpigmentation and mild swelling of the palms of the hands but this is much improved.  It was her right hand specifically that was swollen.  Her facial swelling has resolved.  She continues on Cefdinir nd since has 1 day remaining, she was prescribed cefdinir for a UTI requiring hospitalization at the end of April.  Reports no new symptoms.  Her labs are stable.  She is scheduled for treatment again.    Patient states at this time she is not tolerating her chemotherapy well.     Patient seen in the emergency department on 5/2/2020 for what was suspected to be an allergic reaction.  She called our on-call service on Saturday explaining that she was experiencing hand and face swelling.  She was instructed to proceed to the emergency department at which time she was assessed and prescribed a Medrol Dosepak.  She had just completed her 5-FU and was unhooked on Friday, 5/1/2020.      She reports since her ED visit on 5/2/2020 her symptoms have not improved. Her hands and feet were swollen upon presenting to the ED and she could barely make a fist. She states that she feels so swollen she is not able to stand it. She states that her skin on the hands and feet are peeling extensively as well, besides changing color to more dark.     She also reports significant fatigue after her first week of the 5-FU treatment but she expected this side effect. She also notices significant increase in her neuropathy to the point that she is not able to even walk around in her home without her house slippers due to irritation from her rugs. She denies and associated nausea or vomiting at this time. She also has episodes of  epistaxis every day after the chemotherapy cycle #7.  She does report working full-time during the week of chemotherapy.    Laboratory studies, 5/13/2020, show moderate thrombocytopenia platelets 123,000, low normal WBC 4140 including ANC 2720 and normal hemoglobin 13.4.  Because significant hand-foot syndrome, will decrease both 5-FU and oxaliplatin by 50%, and eliminate bolus dose of 5-FU.    On 5/21/2020 patient had a PET scan performed which showed no convincing evidence of residual disease in abdomen or pelvis, no metastatic disease within the chest or neck.     Cycle #8 FOLFOX 6 was given on 5/13/2020, with 50% dose reduction for both 5-FU and oxaliplatin, and also examination of bolus 5-FU.  She states on 5/27/2020 that with the recent reduction of the chemotherapy she feels significantly better. She has more energy and is able to do her daily routine.      PET scan examination on 5/21/2020 reported no evidence of metastatic disease in chest abdomen pelvis.  There was a stable 6 mm right upper lobe pulmonary nodule.    Laboratory studies on 5/27/2020 showed mild leukocytopenia WBC 3720 but a normal ANC 2250 and lymphocytes 630.  Normal platelets 163,000 and hemoglobin 12.6.  Chemistry lab reported normal renal function, liver function panel and a electrolytes, elevated glucose 164.    Laboratory studies 6/24/2020, showed normal hemoglobin 13.4 but macrocytosis .9.  Normal platelets 210,000 and WBC 5870.  She had normal CMP.     Patient last time was here in late June 2020.  Since that time she had surgical procedure for low anterior colon resection, coloanal anastomosis on 8/3/2020.  She later developed a perirectal abscess, required surgical drainage on 8/14/2020.  She was discharged on 8/27/2020.    Patient reports to me she still has lower abdominal wall vacuum suction in place.  She denies fever sweating chills.  Performance status is ECOG 1.  She continues to walk with part-time in office, and  part-time at home.  She does have visiting nurse come to the home for wound care.    CT scan for chest abdomen pelvis on 9/9/2020 reported a new 8 mm noncalcified pulmonary nodule in the left lower lobe.  Otherwise stable right upper lobe 6 mm pulmonary nodule, and no evidence of disease recurrence in the abdomen or pelvis.     Laboratory study on 9/16/2020 reported elevated AST 55, ALT 95, alk phosphatase 143 but normal total bilirubin 0.3.  Chemistry lab otherwise unremarkable.  She has completed normal CBC.      Because of the abnormal CT scan examination for chest abdomen pelvis on 9/9/2020 reported a new 8 mm noncalcified pulmonary nodule in the left lower lobe, we obtained a PET scan examination for further evaluation, which was done on 9/18/2020.  This study reported several pulmonary nodules, some of them are hypermetabolic, newly developed compared to previous PET scan in May 2020.  Certainly does highly suspicious for metastatic disease.  There are also hypermetabolic activity in the abdomen and pelvic area which is hard to differentiate from surgical scar versus metastatic malignancy.    Laboratory study on 10/13/2020 reported normal CBC with Hb 13.9, platelets 302,000 and WBC 5520 including ANC 3830.  Chemistry lab reported normalized AST 20, alk phosphatase 116 improved ALT 35, and maintains normal bilirubin, with normal electrolytes and liver function panel.     Patient was started on first cycle of palliative chemotherapy FOLFIRI on 10/13/2020.    MEDICATIONS    Current Outpatient Medications:   •  clonazePAM (KlonoPIN) 1 MG tablet, Take 1 tablet by mouth 3 (Three) Times a Day As Needed for Anxiety or Seizures., Disp: 90 tablet, Rfl: 0  •  Diclofenac Sodium (VOLTAREN) 1 % gel gel, Apply 4 g topically to the appropriate area as directed 3 (Three) Times a Day., Disp: 150 g, Rfl: 3  •  dicyclomine (BENTYL) 20 MG tablet, TAKE 1 TABLET BY MOUTH EVERY 6 HOURS AS NEEDED, Disp: 360 tablet, Rfl: 0  •   diphenoxylate-atropine (LOMOTIL) 2.5-0.025 MG per tablet, TAKE 1 TABLET BY MOUTH 4 (FOUR) TIMES A DAY AS NEEDED FOR DIARRHEA., Disp: 120 tablet, Rfl: 1  •  enoxaparin (Lovenox) 100 MG/ML solution syringe, Inject 1 mL under the skin into the appropriate area as directed Every 12 (Twelve) Hours., Disp: 60 mL, Rfl: 2  •  escitalopram (Lexapro) 10 MG tablet, Take 1 tablet by mouth Daily., Disp: 30 tablet, Rfl: 1  •  famotidine (PEPCID) 20 MG tablet, TAKE 1 TABLET BY MOUTH TWICE A DAY (Patient taking differently: Take 20 mg by mouth 2 (Two) Times a Day.), Disp: 180 tablet, Rfl: 1  •  HYDROcodone-acetaminophen (NORCO) 7.5-325 MG per tablet, Take 1 tablet by mouth Every 6 (Six) Hours As Needed for Moderate Pain ., Disp: 240 tablet, Rfl: 0  •  Loratadine (CLARITIN) 10 MG capsule, Take 10 mg by mouth Every Evening., Disp: , Rfl:   •  ondansetron (ZOFRAN) 8 MG tablet, Take 1 tablet by mouth 3 (Three) Times a Day As Needed for Nausea or Vomiting., Disp: 60 tablet, Rfl: 5  •  prochlorperazine (COMPAZINE) 10 MG tablet, Take 1 tablet by mouth Every 6 (Six) Hours As Needed for Nausea or Vomiting., Disp: 30 tablet, Rfl: 2  No current facility-administered medications for this visit.     ALLERGIES:   No Known Allergies    SOCIAL HISTORY:       Social History     Tobacco Use   • Smoking status: Former Smoker     Packs/day: 1.00     Years: 24.00     Pack years: 24.00     Types: Electronic Cigarette, Cigarettes   • Smokeless tobacco: Never Used   • Tobacco comment: LAST CIGARETTE 8/12/2020   Substance Use Topics   • Alcohol use: Not Currently   • Drug use: No         FAMILY HISTORY:   Mother has positive factor V Leiden mutation, although she did not have thrombosis, mother also is coronary disease with stenting, she also is occasional bruising.  Maternal grandmother had DVT, she had multiple surgical procedures.  Patient mother's half-brother had metastatic colon cancer at diagnosis in his 50s.  Maternal great aunt has breast cancer.   "Patient will follow his skin cancer in his 60s, exclusive.    REVIEW OF SYSTEMS:  Review of Systems   Constitutional: Negative for activity change, appetite change, chills, diaphoresis, fatigue, fever and unexpected weight change.   HENT: Negative for congestion, hearing loss, mouth sores, nosebleeds and trouble swallowing.    Eyes: Negative for photophobia and visual disturbance.   Respiratory: Negative for cough, chest tightness and shortness of breath (This is almost resolved as of 1/5/2020.).    Cardiovascular: Negative for chest pain, palpitations and leg swelling.   Gastrointestinal: Negative for abdominal distention, abdominal pain, anal bleeding, constipation, diarrhea and nausea.   Endocrine: Negative for cold intolerance and heat intolerance.   Genitourinary: Negative for dysuria and hematuria.   Musculoskeletal: Negative for arthralgias, back pain, joint swelling and neck pain.   Skin: Negative for color change, rash and wound.   Allergic/Immunologic: Positive for immunocompromised state (Secondary to adjuvant chemotherapy). Negative for environmental allergies and food allergies.   Neurological: Positive for weakness and numbness (Mild numbness involving fingertips and toes). Negative for dizziness and headaches.   Hematological: Negative for adenopathy. Does not bruise/bleed easily.   Psychiatric/Behavioral: Negative for agitation, confusion, dysphoric mood (Depression under control with medication) and suicidal ideas. The patient is nervous/anxious.               Vitals:    01/19/21 0855   BP: 135/99   Pulse: 118   Resp: 16   Temp: 97.7 °F (36.5 °C)   SpO2: 95%   Weight: 96.3 kg (212 lb 3.2 oz)   Height: 166 cm (65.35\")   PainSc: 0-No pain     Current Status 1/19/2021   ECOG score 0         Physical Exam  Vitals signs reviewed.   Constitutional:       General: She is not in acute distress.     Appearance: Normal appearance. She is well-developed.      Comments: Seated in wheelchair, accompanied by her " mom.   HENT:      Head: Normocephalic and atraumatic.      Comments: Facial swelling improved  Eyes:      General: No scleral icterus.     Conjunctiva/sclera: Conjunctivae normal.      Pupils: Pupils are equal, round, and reactive to light.   Neck:      Musculoskeletal: Neck supple.   Cardiovascular:      Rate and Rhythm: Normal rate and regular rhythm.      Heart sounds: Normal heart sounds. No murmur.   Pulmonary:      Effort: Pulmonary effort is normal. No respiratory distress.      Breath sounds: Normal breath sounds. No wheezing, rhonchi or rales.   Chest:      Comments: Benign-appearing right chest Mediport.  Bruising bilaterally on the upper chest.  Abdominal:      General: Bowel sounds are normal. There is no distension.      Palpations: Abdomen is soft.   Musculoskeletal: Normal range of motion.   Skin:     General: Skin is warm and dry.      Findings: No rash.      Comments: Abdomen wound has healed completely   Neurological:      Mental Status: She is alert and oriented to person, place, and time.       RECENT LABS:    Lab Results   Component Value Date    WBC 3.98 01/19/2021    HGB 14.2 01/19/2021    HCT 43.8 01/19/2021    MCV 98.9 (H) 01/19/2021     01/19/2021     Lab Results   Component Value Date    NEUTROABS 2.72 01/19/2021     Glucose   Date Value Ref Range Status   01/19/2021 110 74 - 124 mg/dL Final     BUN   Date Value Ref Range Status   01/19/2021 8 6 - 20 mg/dL Final     Creatinine   Date Value Ref Range Status   01/19/2021 0.81 0.60 - 1.10 mg/dL Final   09/09/2020 0.60 0.60 - 1.30 mg/dL Final     Comment:     Serial Number: [id number]:  [id number]     Sodium   Date Value Ref Range Status   01/19/2021 138 134 - 145 mmol/L Final     Potassium   Date Value Ref Range Status   01/19/2021 4.0 3.5 - 4.7 mmol/L Final     Chloride   Date Value Ref Range Status   01/19/2021 103 98 - 107 mmol/L Final     CO2   Date Value Ref Range Status   01/19/2021 23.7 22.0 - 29.0 mmol/L Final     Calcium    Date Value Ref Range Status   01/19/2021 9.7 8.5 - 10.2 mg/dL Final     Total Protein   Date Value Ref Range Status   01/19/2021 7.4 6.3 - 8.0 g/dL Final     Albumin   Date Value Ref Range Status   01/19/2021 3.90 3.50 - 5.20 g/dL Final     ALT (SGPT)   Date Value Ref Range Status   01/19/2021 56 (H) 0 - 33 U/L Final     AST (SGOT)   Date Value Ref Range Status   01/19/2021 33 (H) 0 - 32 U/L Final     Alkaline Phosphatase   Date Value Ref Range Status   01/19/2021 102 38 - 116 U/L Final     Total Bilirubin   Date Value Ref Range Status   01/19/2021 0.4 0.2 - 1.2 mg/dL Final     eGFR Non  Amer   Date Value Ref Range Status   01/19/2021 78 >60 mL/min/1.73 Final     BUN/Creatinine Ratio   Date Value Ref Range Status   01/19/2021 9.9 7.3 - 30.0 Final     Anion Gap   Date Value Ref Range Status   01/19/2021 11.3 5.0 - 15.0 mmol/L Final     Lab Results   Component Value Date    CEA 1.32 09/16/2020     IMAGING:     F-18 FDG PET FROM SKULL BASE TO MID THIGH WITH PET/CT FUSION 1/12/2021     HISTORY: Rectal cancer, status post chemotherapy; restaging     TECHNIQUE: Radiation dose reduction techniques were utilized, including  automated exposure control and exposure modulation based on body size.   Blood glucose level at time of injection was 110 mg/dL.  6.1 mCi of F-18  FDG were injected and PET was performed from skull base to mid thigh. CT  was obtained for localization and attenuation correction. Time at  injection 0718. PET start time 0834.      Compared with PET CT 09/18/2020 and multiple CT chest, abdomen and  pelvis dating back to 03/13/2020     FINDINGS:      There is no cervical adenopathy demonstrating FDG uptake significantly  above that of blood pool. The thyroid, submandibular and parotid glands  have a roughly symmetric FDG uptake. Loculated hypodense collection  within the left chest wall in the area of the previously seen port  measures 1.9 cm and demonstrates mild-to-moderate FDG uptake with a  max  SUV of 4.9. Of note, the patient had a new port placed on 12/18/2020 and  removal of the left port..     Hypermetabolic right hilar adenopathy is present and measures  approximately 1.1 cm in short axis dimension, grossly unchanged since  12/21/2020. It demonstrates a max SUV of 5.     There is no mediastinal or axillary adenopathy demonstrating FDG uptake  significantly above that of blood pool.     Heart is normal in size.     Bilateral pulmonary nodules are present, as before, with index nodules  as below:  *  The right upper lobe pulmonary nodule measures 0.6 cm, grossly  unchanged since 09/18/2020. However, it has significantly decreased in  degree of FDG uptake with a max SUV of 1.3 (previously 4).  *  Sub-6 mm pulmonary nodule within the left upper lobe is grossly  unchanged over multiple prior CTs.  *  The previously seen FDG avid 0.8 cm pulmonary nodule within the left  lower lobe is no longer visualized.     No new suspicious FDG avid pulmonary nodules are visualized. The degree  of pulmonary consolidation within the right base has improved since  12/23/2020 with resolution of the small right pleural effusion.  Persistent patchy areas of groundglass opacification within the  bilateral lungs have essentially resolved.     No focal FDG avid abnormality is visualized within the liver, pancreas,  spleen, adrenal glands or kidneys. Marked hepatic steatosis present.  Gallbladder is surgically absent. There is no hydronephrosis.     There is no abdominopelvic adenopathy demonstrating FDG uptake  significantly above that of blood pool. Postsurgical changes from  diverting right lower quadrant ileostomy are present. The degree of FDG  uptake within the midline surgical scar and right lower quadrant  ileostomy has decreased. Additionally, the degree of FDG uptake within  the relatively symmetric presacral soft tissue thickening has also  decreased with a max SUV of 5.6 (previously 12.4). The previously  seen  air within the presacral soft tissues is no longer visualized. While a  discrete measurable fluid collection is not visualized, it cannot be  excluded in the absence of intravenous contrast. Presacral soft tissue  thickening measures up to 2.4 cm, as before. No free intraperitoneal air  is present.     There are no suspicious FDG avid lytic or blastic osseous lesions.     IMPRESSION:  1.  Interval decrease in FDG uptake within the right upper lobe  pulmonary nodule. Previously seen FDG avid left lower lobe pulmonary  nodule has resolved.  2.  Mild to moderately FDG avid right hilar node which is grossly  unchanged since 12/21/2020. The previously seen bilateral groundglass  opacification and right pleural effusion have resolved with improvement  in the degree of right basilar pulmonary consolidation since 12/23/2020.  While the adenopathy may be reactive, continued close attention on  follow-up is recommended to exclude metastatic disease.  3.  Resolution of the air within the presacral soft tissues with overall  decrease in the degree of FDG uptake within the operative bed of the  pelvis and subcutaneous tissues as detailed above.  4.  Postsurgical changes from recent left Port-A-Cath removal are  present with probable seroma in the operative bed, the sterility of  which cannot be determined based on imaging and correlation with patient  history and physical exam is recommended to exclude abscess.  5.  Other findings as above.        Assessment/Plan      ASSESSMENT:   1.  Rectal cancer, rectal ultrasound examination reported T3N0 disease without lymphadenopathy.   · CT scan of chest, abdomen and pelvis reported no lymphadenopathy in the abdomen, pelvis or chest. She does have small pulmonary nodule 6-7 mm and 2 mm with indeterminate feature. There is no solid evidence of metastatic disease otherwise.   · Based on the CT scan and rectal ultrasound imaging studies, this patient had stage IIA (T3N0M0) disease.     · She had PET scan examination on 8/8/2019 which reported a hypermetabolic right upper lobe pulmonary nodule 6 mm with SUV 5.6.  This is suspicious for metastatic disease however it is too small to be biopsied.  This patient may have stage IV disease.   · She initiated concurrent radiation with continuous 5-FU on 8/12/2019.  · Patient finished concurrent chemoradiation therapy.  · Patient underwent surgical resection of the rectal tumor and diverting loop ileostomy on 12/2/2019 with Dr. Ye.  Pathology evaluation reported residual T3 disease, with 1 out of 14 lymph nodes positive for malignancy.  Certainly this patient has at least stage IIIb rectal cancer (T3 N1 M0?)  · On 1/22/2020 adjuvant chemotherapy FOLFOX 6 regimen initiated.    · On 2/5/2022 cycle #2 was delayed secondary to neutropenia.  She was given 3 days of Granix.   · Emend added with cycle 3.  With improved nausea control  · Continuing home Granix x3 days following 5-FU disconnect  · 3/11/2020 due for cycle 4, however, she is experiencing progressive abdominal pain and occasional fevers.   · CT scan performed on 3/13/2020 reveals no evidence of progressive or recurrent disease.  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  · Patient hospitalized 4/24-4/26/20 after cycle 5 of chemotherapy with acute UTI.  CT abdomen/pelvis noting fluid collection in the presacral region having diminished in size compared to CT dated 3/13/2020.  Patient was evaluated by Dr. Ye while in the hospital with further plans to evaluate possible colovaginal fistula following completion of chemotherapy.  Patient did respond to IV antibiotics and discharged home on oral cefdinir.  · 4/29/2020 cycle 6 of FOLFOX.  Urinary symptoms have resolved   · Patient seen in the [facility] ED on 5/2/2020 for what was suspected to be an allergic reaction.  She called our service on Saturday explaining that she was experiencing hand and face swelling.  She was instructed to proceed to the  emergency department at which time she was assessed and prescribed a Medrol Dosepak.  She had just completed her 5-FU and was unhooked on Friday, 5/1/2020.  Her symptoms have resolved in the office on assessment, 5/5/2020.  · The patient had grade 3 hand-foot syndrome based on symptomology.  Patient had cycle #8 of 5-FU on 5/13/2020. Due to her symptoms and poor tolerance to the 5-FU, her treatment dose will be reduced 50% for oxaliplatin and infusional 5-FU.  Bolus 5-FU will be discontinued..  · On 5/13/2020  We discussed further treatment options pending the scan results.  If she has indeed residual disease or metastatic disease, we will switch her to irinotecan plus Avastin or anti-EGFR monoclonal antibody.  She will need a further molecular testing of her tumor samples in that case.  · On 5/21/2020 patient had a PET scan and it showed no evidence of of metastatic disease in the neck, chest, abdomen or pelvis.  There was fluid accumulation/abscess.   · On 5/27/2020 I discussed with the patient that we can discontinue chemotherapy at this time.  She will follow-up with Dr. Ye to discuss a possibility to reverse the ileostomy.  We likely will obtain anther PET scan in 3 to 4 months for reassessment.    · Patient was seen by Dr. Ye on 6/19/2019 for evaluation and to discuss possible take down of her ileostomy.  She is scheduled to have a gastrografin enema on 7/2/2020 to evaluate for a fistula, and then a colonoscopy on 7/15/2020, both done by Dr. Ye.  She states that based on the above imaging and procedure findings, she may have a more extensive surgery done or just have her ileostomy reversed, which would likely be done in August 2020.  This will all be coordinated under the care of Dr. Ye.     · I reviewed scan results with the patient on 9/16/2020 for the most recent CT scan from 09/09/2020 and also compared it to her previous PET scan examination from 05/21/2020 and also the original PET scan from  08/09/2019.  The original PET scan there is a small right upper lobe pulmonary nodule 6 mm with SUV 5.6. So in the 05/2020 PET scan the nodule is still there but activity seems much less and this most recent CT scan examination also documented the preexisting small pulmonary nodule however there is a new left lower lobe pulmonary nodule 8 mm in size and I shared with the patient today this nodule is suspicious for metastatic disease. Laboratory studies reported minimal CEA level 1.32 on 09/09/2020. Liver function panel was only marginally abnormal with the ALT 45, the remaining is normal. However laboratory study today showed worsening AST 55, ALT 95 and alkaline phosphatase 143 but is still normal, total bilirubin 0.3.   · So I discussed with the patient on 9/16/2020 recommended to have repeat PET scan examination for assessment because the left lower lobe pulmonary nodule is too small to be biopsied, and if the PET scan reports hypermetabolic lesion, I recommended to have stereotactic radiation therapy. Explained to patient that she is not a really good candidate to have thoracotomy for resection because she still has unhealed wound in the abdomen. I think the stereotactic radiation therapy will be a more feasible choice.  · Laboratory study on 9/16/2020 reported worsening liver function panel with both elevated AST, ALT and also slightly worsened alkaline phosphatase despite having normal total bilirubin. I recommended to repeat laboratory study for reevaluation. The only new medication she has in the past several days is Augmentin but otherwise no change of condition. She denies any recent viral infection. We will monitor this.   · PET scan examination obtained on 9/18/2020 showed metastatic disease.  It reported a few hypermetabolic pulmonary nodules, and some new small pulmonary nodules, all of them highly suspicious for metastatic disease.  She also has hypermetabolic activity in the abdomen and pelvic area  which could be related to scar tissue from her recent surgery versus metastatic disease.  Nevertheless overall picture fits with metastatic cancer.  · I discussed with the patient on 9/20/2020, we reviewed the PET scan images together, and I recommended to have systematic chemotherapy, I do not think stereotactic reading therapy to the hypermetabolic lesions in the lungs warranted at this time because even those will be treated with reading therapy, she will still need systematic chemotherapy.  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  I did discuss with the patient using anti-EGFR monoclonal antibody versus anti-VEGF monoclonal antibody.     · Palliative chemotherapy cycle#1 FOLFIRI was started on 10/13/2020.  No bolus 5-FU given considering previous poor tolerance.  Genetic studies still pending.   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  Microsatellite stable, mutation burden 5/Mb.    · Discussed with the patient 10/27/2020, because of the K-s[place]ia mutation, she is not a candidate for anti-EGFR monoclonal antibody such as Erbitux or vectibix.  She could be a candidate for anti-VEGF monoclonal antibody, however because of active wound, she is not a candidate right now at this moment.  · Patient seen in the ED for acute right neck pain on 11/16/2020.  CT angiogram as well as CT of the cervical spine performed with no acute findings but notably one of her pulmonary nodules, left upper lobe, somewhat decreased in size.  Patient given prednisone pack.  Further details below.  · Patient due today for cycle #4 FOLFIRI.  Plan repeat PET scan after cycle 6.  · Last received cycle #5 chemotherapy without irinotecan on 12/8/2020.   · Patient here 12/29/2020 for evaluation and consideration of cycle 6, but she continues to complain of swelling.  She does have swelling typically after treatment as well.  We will hold treatment for 1 week and reevaluate next  week.  Still planning on PET scan following cycle 6.   · Cycle #6 chemotherapy will be resumed on 1/5/2021.  Started back on original irinotecan.  · PET scan examination obtained on 1/12/2021 after cycle #6 chemotherapy reported improved a pulmonary nodule hypermetabolic activity.  Confirmed these are metastatic lesions.  · Patient is evaluated today 1/19/2020, will continue cycle #7 FOLFIRI chemotherapy.  However Avastin will be on hold see next section.    2 .  Pulmonary nodule, hypermetabolic on PET scan.   · Her original PET scan examination from 8/8/2019 reported a small 8 mm right upper apex pulmonary nodule but hypermetabolic SUV 5.1.  That was too small to be biopsied, however there was always a possibility of metastatic disease considering that high activity despite a such a small nodule.  · Her chest CT scan examination from 3/13/2020 reported this shrank to 6 mm.    · PET scan examination on 5/21/2020 reported no metabolic activity at this residual nodule.  This needs to be monitored.    · PET scan examination 9/18/2020 reported couple of hypermetabolic pulmonary nodules, besides a few extra small pulmonary nodules.  Those are highly suspicious for metastatic disease.    · PET scan examination obtained on 1/12/2021 after cycle #6 chemotherapy reported improved a pulmonary nodule hypermetabolic activity.  Confirmed these are metastatic lesions.  · 1/19/2021, patient reports she will see thoracic surgeon tomorrow at the [facility] where she seeks second opinion evaluation, and to see whether she would be a candidate for resection of pulmonary nodules.  I discussed with the patient, she has at least 2 hypermetabolic lesions although they are responded to chemotherapy, I do not think she would be a candidate for surgery.    3.   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  Low-dose Lovenox discontinued and initiated Xarelto 20 mg daily.  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  · Patient held Xarelto for 2 days prior to MediPort removal from the left chest wall and placement of new port in the right chest wall on 12/18/2020.  She resumed Xarelto that evening.  · Progressive swelling in the neck, face, upper extremities prompting current hospitalization with CT scan showing thrombosis in the right subclavian vein and SVC.  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  Although she had been off of Xarelto for a few days, clearly Xarelto was not prohibiting progression of her thrombosis after she resumed treatment and there was some evidence to suggest thrombosis had been present at least in the innominate vein on prior scan in September but now appears chronic.  Current acute thrombosis involving SVC and right subclavian.  · Patient was admitted and placed on heparin drip  · Patient was evaluated by vascular surgery and intervention was not recommended for thrombolysis/thrombectomy.  · On 12/22/2020, the patient developed worsening shortness of breath, increasing upper extremity edema consistent with worsening SVC syndrome.  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  · Symptoms improved and patient was discharged 12/24/2020 on Lovenox 100 mg every 12 hours.    · Returns 12/29/2020 for evaluation continuing Lovenox,  overall tolerating it well.  No bleeding issues.  · Improved edema 1/5/2021.  Will continue Lovenox weight-based every 12 hours.  · On 1/19/2021, patient reports improved facial swelling.  She however does have being bruise on her abdomen wall where she does Lovenox injection.  However she does not have a spontaneous epistaxis, gum bleeding or other bleeding.     4.  This patient also has strong family history for malignancy the patient had appointment with genetic counseling on September 3, 2019.  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    5.  Mild anemia, improved since her surgery.    · She also has a history of iron deficiency.  Iron studies reveal a saturation of just 10%.  She was started on oral iron but was unable to tolerate due to GI side effects.   · Status post Injectafer 2/5/2020 and 2/12/2020   · Improved hemoglobin 13.5 on 1/5/2021.  · Hemoglobin 14.2 on 1/19/2021.  Continue to monitor.    6.  Peripheral neuropathy secondary to chemotherapy.    · This is mild involving bilateral hands and feet as reported on 9/16/2020.   · Will avoid chemotherapy with oxaliplatin.  · Stable mild neuropathy as of 11/10/2020  · Patient reports worsening peripheral neuropathy and cycle #5 chemotherapy on 12/8/2020 with and without irinotecan.   · On 1/5/2021, patient reports improvement of peripheral neuropathy, will add irinotecan back to chemotherapy.  Continue to monitor and adjust medication.  · On 1/19/2021, patient reports no worsening of peripheral neuropathy after restarting irinotecan    7.  History of hand-foot syndrome grade 3.    · It become so significant after cycle #7 FOLFOX treatment.  Discussed with patient on 5/13/2020, will discontinue the bolus 5-FU dose, and also decrease 50% of the infusion dose through the pump.   · We also use Medrol Dosepak for possible recurrent symptomology.  She responded this well.   · Her hand-foot syndrome improved.  · Under current treatment with FOLFIRI, patient not  receiving 5-FU bolus.  No recurrent issues.    8.  Hyperlacrimation and mild scleral irritation related to 5-FU.  She has been taking steroid eyedrops.  This has improved her hyperlacrimation.  · Not currently an issue.  Continue to monitor with 5-FU.      9.  Abnormal liver function panel.  · Improved liver function panel 9/18/2020.  This is probably related to her recent infection.  · She has normalized AST, alk phosphatase, and a much improved only marginally elevated ALT 35 on 10/13/2020.    · Normalized liver function panel on 10/27/2020.    · Normal liver panel on 11/10/2020.    · Normal liver function panel on 12/5/2021.    · Slightly worsening liver function with AST 33 and ALT 56 1/19/2021.  Continue to monitor.    10.  Intermittent leukocytopenia secondary to chemotherapy.   · WBC currently normal at 5220 and the neutrophil 3520 on 1/5/2021.  Continue to monitor.    · On 1/19/2021, WBC 3980 including ANC 2720.  Continue to monitor for now.  Consider G-CSF if neutropenia becomes an ongoing issue.      11.  Depression.  Patient seen by JULIET Davenport on 11/9/2020.  She was started on mirtazapine.  Lexapro was discontinued.  This has definitely improved her mood with the patient feeling overall much better.  She is sleeping better.  Appetite is improved with her actually eating more than she wants to.  She plans to continue follow-up with supportive oncology.    12.  Right neck/shoulder pain occurring twice now.  Patient reports that this occurred with both cycle #2 and cycle #3.  She did not mention it with cycle #2.  She did mention it was cycle #3 and per review of the EMR nursing note it was during irinotecan infusion.  Pain then subsided.  However it returned 5 days later with the patient going to the ED on 11/16/2020.  As imaging details are noted above, no acute findings were found.  Etiology remains unclear.  Patient's Mediport is in the opposite side of her chest.  She was given a prednisone  taper which she continues at this time.  Apparently it was not related to irinotecan.     13.  Intermittent upper abdominal discomfort.  This improves with eating.  After further discussion with the patient she also notes 3 nights of increased reflux when laying down.  We discussed her symptoms could be related to increase stomach acid or potential ulcer.  She is currently taking Pepcid 20 mg daily.  I instructed her to add Prilosec 20 mg daily.         PLAN:  1. Resume cycle #7 chemotherapy today, with infusional 5-FU and irinotecan.  However Avastin will be canceled today as patient is seeking evaluation from thoracic surgery at the [facility], for possible resection of metastatic pulmonary lesions.  2. Continue Lovenox 100 mg SQ every 12 hours for anticoagulation.   3. Keep appointment with thoracic surgeon at the [facility].  4. Patient also reports she signed up for telemedicine evaluation at the [facility] [place].  5. Return in 2 weeks for follow-up visit with me prior to cycle #8 chemotherapy FOLFIRI.    6. Consider possibly addition of Avastin from cycle #8.      I presently reviewed her PET scan images from 1/12/2021.  I also shared with the patient and her  today for those images, and compared to those from 9/18/2020.    Spent more than 50 minutes in direct face-to-face patient care, over half of that time were for counseling and coordination of care.    Dong Nguyen MD PhD  01/19/21        CC:  Deepika Vela III NP-C   MD Perla Bowers MD

## 2021-01-21 ENCOUNTER — INFUSION (OUTPATIENT)
Dept: ONCOLOGY | Facility: HOSPITAL | Age: 41
End: 2021-01-21

## 2021-01-21 DIAGNOSIS — Z45.2 ENCOUNTER FOR FITTING AND ADJUSTMENT OF VASCULAR CATHETER: ICD-10-CM

## 2021-01-21 DIAGNOSIS — C20 ADENOCARCINOMA OF RECTUM (HCC): Primary | ICD-10-CM

## 2021-01-21 PROCEDURE — 25010000003 HEPARIN LOCK FLUSH PER 10 UNITS: Performed by: INTERNAL MEDICINE

## 2021-01-21 RX ORDER — SODIUM CHLORIDE 0.9 % (FLUSH) 0.9 %
10 SYRINGE (ML) INJECTION AS NEEDED
Status: DISCONTINUED | OUTPATIENT
Start: 2021-01-21 | End: 2021-01-21 | Stop reason: HOSPADM

## 2021-01-21 RX ORDER — SODIUM CHLORIDE 0.9 % (FLUSH) 0.9 %
10 SYRINGE (ML) INJECTION AS NEEDED
Status: CANCELLED | OUTPATIENT
Start: 2021-01-21

## 2021-01-21 RX ORDER — HEPARIN SODIUM (PORCINE) LOCK FLUSH IV SOLN 100 UNIT/ML 100 UNIT/ML
500 SOLUTION INTRAVENOUS AS NEEDED
Status: CANCELLED | OUTPATIENT
Start: 2021-01-21

## 2021-01-21 RX ORDER — HEPARIN SODIUM (PORCINE) LOCK FLUSH IV SOLN 100 UNIT/ML 100 UNIT/ML
500 SOLUTION INTRAVENOUS AS NEEDED
Status: DISCONTINUED | OUTPATIENT
Start: 2021-01-21 | End: 2021-01-21 | Stop reason: HOSPADM

## 2021-01-21 RX ADMIN — SODIUM CHLORIDE, PRESERVATIVE FREE 10 ML: 5 INJECTION INTRAVENOUS at 10:06

## 2021-01-21 RX ADMIN — Medication 500 UNITS: at 10:06

## 2021-02-02 ENCOUNTER — INFUSION (OUTPATIENT)
Dept: ONCOLOGY | Facility: HOSPITAL | Age: 41
End: 2021-02-02

## 2021-02-02 ENCOUNTER — OFFICE VISIT (OUTPATIENT)
Dept: ONCOLOGY | Facility: CLINIC | Age: 41
End: 2021-02-02

## 2021-02-02 VITALS
OXYGEN SATURATION: 93 % | WEIGHT: 216 LBS | SYSTOLIC BLOOD PRESSURE: 133 MMHG | DIASTOLIC BLOOD PRESSURE: 90 MMHG | HEART RATE: 88 BPM | HEIGHT: 65 IN | TEMPERATURE: 97.7 F | RESPIRATION RATE: 16 BRPM | BODY MASS INDEX: 35.99 KG/M2

## 2021-02-02 VITALS — DIASTOLIC BLOOD PRESSURE: 78 MMHG | SYSTOLIC BLOOD PRESSURE: 115 MMHG | HEART RATE: 90 BPM

## 2021-02-02 DIAGNOSIS — D68.51 HETEROZYGOUS FACTOR V LEIDEN MUTATION (HCC): Chronic | ICD-10-CM

## 2021-02-02 DIAGNOSIS — Z79.01 ANTICOAGULATION ADEQUATE: ICD-10-CM

## 2021-02-02 DIAGNOSIS — C20 ADENOCARCINOMA OF RECTUM (HCC): Primary | ICD-10-CM

## 2021-02-02 DIAGNOSIS — C78.00 MALIGNANT NEOPLASM METASTATIC TO LUNG, UNSPECIFIED LATERALITY (HCC): ICD-10-CM

## 2021-02-02 DIAGNOSIS — I26.99 OTHER PULMONARY EMBOLISM WITHOUT ACUTE COR PULMONALE, UNSPECIFIED CHRONICITY (HCC): ICD-10-CM

## 2021-02-02 DIAGNOSIS — I82.210 THROMBOSIS OF SUPERIOR VENA CAVA (HCC): ICD-10-CM

## 2021-02-02 DIAGNOSIS — C20 ADENOCARCINOMA OF RECTUM (HCC): ICD-10-CM

## 2021-02-02 LAB
ALBUMIN SERPL-MCNC: 3.7 G/DL (ref 3.5–5.2)
ALBUMIN/GLOB SERPL: 1.2 G/DL (ref 1.1–2.4)
ALP SERPL-CCNC: 97 U/L (ref 38–116)
ALT SERPL W P-5'-P-CCNC: 39 U/L (ref 0–33)
ANION GAP SERPL CALCULATED.3IONS-SCNC: 8.6 MMOL/L (ref 5–15)
AST SERPL-CCNC: 21 U/L (ref 0–32)
BASOPHILS # BLD AUTO: 0.02 10*3/MM3 (ref 0–0.2)
BASOPHILS NFR BLD AUTO: 0.5 % (ref 0–1.5)
BILIRUB SERPL-MCNC: 0.2 MG/DL (ref 0.2–1.2)
BILIRUB UR QL STRIP: ABNORMAL
BUN SERPL-MCNC: 10 MG/DL (ref 6–20)
BUN/CREAT SERPL: 13.5 (ref 7.3–30)
CALCIUM SPEC-SCNC: 9.3 MG/DL (ref 8.5–10.2)
CHLORIDE SERPL-SCNC: 104 MMOL/L (ref 98–107)
CLARITY UR: CLEAR
CO2 SERPL-SCNC: 24.4 MMOL/L (ref 22–29)
COLOR UR: YELLOW
CREAT SERPL-MCNC: 0.74 MG/DL (ref 0.6–1.1)
DEPRECATED RDW RBC AUTO: 57.2 FL (ref 37–54)
EOSINOPHIL # BLD AUTO: 0.18 10*3/MM3 (ref 0–0.4)
EOSINOPHIL NFR BLD AUTO: 4.4 % (ref 0.3–6.2)
ERYTHROCYTE [DISTWIDTH] IN BLOOD BY AUTOMATED COUNT: 15.6 % (ref 12.3–15.4)
GFR SERPL CREATININE-BSD FRML MDRD: 86 ML/MIN/1.73
GLOBULIN UR ELPH-MCNC: 3.1 GM/DL (ref 1.8–3.5)
GLUCOSE SERPL-MCNC: 97 MG/DL (ref 74–124)
GLUCOSE UR STRIP-MCNC: NEGATIVE MG/DL
HCT VFR BLD AUTO: 42.4 % (ref 34–46.6)
HGB BLD-MCNC: 14.4 G/DL (ref 12–15.9)
HGB UR QL STRIP.AUTO: ABNORMAL
IMM GRANULOCYTES # BLD AUTO: 0.01 10*3/MM3 (ref 0–0.05)
IMM GRANULOCYTES NFR BLD AUTO: 0.2 % (ref 0–0.5)
KETONES UR QL STRIP: NEGATIVE
LEUKOCYTE ESTERASE UR QL STRIP.AUTO: NEGATIVE
LYMPHOCYTES # BLD AUTO: 0.87 10*3/MM3 (ref 0.7–3.1)
LYMPHOCYTES NFR BLD AUTO: 21.2 % (ref 19.6–45.3)
MCH RBC QN AUTO: 33.5 PG (ref 26.6–33)
MCHC RBC AUTO-ENTMCNC: 34 G/DL (ref 31.5–35.7)
MCV RBC AUTO: 98.6 FL (ref 79–97)
MONOCYTES # BLD AUTO: 0.49 10*3/MM3 (ref 0.1–0.9)
MONOCYTES NFR BLD AUTO: 11.9 % (ref 5–12)
NEUTROPHILS NFR BLD AUTO: 2.54 10*3/MM3 (ref 1.7–7)
NEUTROPHILS NFR BLD AUTO: 61.8 % (ref 42.7–76)
NITRITE UR QL STRIP: NEGATIVE
NRBC BLD AUTO-RTO: 0 /100 WBC (ref 0–0.2)
PH UR STRIP.AUTO: 6 [PH] (ref 4.5–8)
PLATELET # BLD AUTO: 204 10*3/MM3 (ref 140–450)
PMV BLD AUTO: 8.8 FL (ref 6–12)
POTASSIUM SERPL-SCNC: 4.1 MMOL/L (ref 3.5–4.7)
PROT SERPL-MCNC: 6.8 G/DL (ref 6.3–8)
PROT UR QL STRIP: ABNORMAL
RBC # BLD AUTO: 4.3 10*6/MM3 (ref 3.77–5.28)
SODIUM SERPL-SCNC: 137 MMOL/L (ref 134–145)
SP GR UR STRIP: >=1.03 (ref 1–1.03)
UROBILINOGEN UR QL STRIP: ABNORMAL
WBC # BLD AUTO: 4.11 10*3/MM3 (ref 3.4–10.8)

## 2021-02-02 PROCEDURE — 81003 URINALYSIS AUTO W/O SCOPE: CPT

## 2021-02-02 PROCEDURE — 25010000002 FOSAPREPITANT PER 1 MG: Performed by: INTERNAL MEDICINE

## 2021-02-02 PROCEDURE — 25010000002 DEXAMETHASONE SODIUM PHOSPHATE 100 MG/10ML SOLUTION: Performed by: INTERNAL MEDICINE

## 2021-02-02 PROCEDURE — 25010000002 BEVACIZUMAB PER 10 MG: Performed by: INTERNAL MEDICINE

## 2021-02-02 PROCEDURE — 25010000002 BEVACIZUMAB 100 MG/4ML SOLUTION 4 ML VIAL: Performed by: INTERNAL MEDICINE

## 2021-02-02 PROCEDURE — 25010000002 FLUOROURACIL PER 500 MG: Performed by: INTERNAL MEDICINE

## 2021-02-02 PROCEDURE — 96415 CHEMO IV INFUSION ADDL HR: CPT

## 2021-02-02 PROCEDURE — 85025 COMPLETE CBC W/AUTO DIFF WBC: CPT

## 2021-02-02 PROCEDURE — 25010000002 ATROPINE PER 0.01 MG: Performed by: INTERNAL MEDICINE

## 2021-02-02 PROCEDURE — 80053 COMPREHEN METABOLIC PANEL: CPT

## 2021-02-02 PROCEDURE — 96416 CHEMO PROLONG INFUSE W/PUMP: CPT

## 2021-02-02 PROCEDURE — 25010000002 PALONOSETRON PER 25 MCG: Performed by: INTERNAL MEDICINE

## 2021-02-02 PROCEDURE — 25010000002 LEUCOVORIN CALCIUM PER 50 MG: Performed by: INTERNAL MEDICINE

## 2021-02-02 PROCEDURE — 96368 THER/DIAG CONCURRENT INF: CPT

## 2021-02-02 PROCEDURE — 96367 TX/PROPH/DG ADDL SEQ IV INF: CPT

## 2021-02-02 PROCEDURE — 96417 CHEMO IV INFUS EACH ADDL SEQ: CPT

## 2021-02-02 PROCEDURE — 25010000002 IRINOTECAN PER 20 MG: Performed by: INTERNAL MEDICINE

## 2021-02-02 PROCEDURE — 99214 OFFICE O/P EST MOD 30 MIN: CPT | Performed by: INTERNAL MEDICINE

## 2021-02-02 PROCEDURE — 96413 CHEMO IV INFUSION 1 HR: CPT

## 2021-02-02 PROCEDURE — 96375 TX/PRO/DX INJ NEW DRUG ADDON: CPT

## 2021-02-02 RX ORDER — PALONOSETRON 0.05 MG/ML
0.25 INJECTION, SOLUTION INTRAVENOUS ONCE
Status: COMPLETED | OUTPATIENT
Start: 2021-02-02 | End: 2021-02-02

## 2021-02-02 RX ORDER — ATROPINE SULFATE 0.4 MG/ML
0.25 AMPUL (ML) INJECTION
Status: DISCONTINUED | OUTPATIENT
Start: 2021-02-02 | End: 2021-02-02 | Stop reason: HOSPADM

## 2021-02-02 RX ORDER — SODIUM CHLORIDE 9 MG/ML
250 INJECTION, SOLUTION INTRAVENOUS ONCE
Status: COMPLETED | OUTPATIENT
Start: 2021-02-02 | End: 2021-02-02

## 2021-02-02 RX ORDER — SODIUM CHLORIDE 9 MG/ML
250 INJECTION, SOLUTION INTRAVENOUS ONCE
Status: CANCELLED | OUTPATIENT
Start: 2021-02-02

## 2021-02-02 RX ORDER — PALONOSETRON 0.05 MG/ML
0.25 INJECTION, SOLUTION INTRAVENOUS ONCE
Status: CANCELLED | OUTPATIENT
Start: 2021-02-02

## 2021-02-02 RX ORDER — ATROPINE SULFATE 1 MG/ML
0.25 INJECTION, SOLUTION INTRAMUSCULAR; INTRAVENOUS; SUBCUTANEOUS
Status: CANCELLED | OUTPATIENT
Start: 2021-02-02

## 2021-02-02 RX ADMIN — ATROPINE SULFATE 0.25 MG: 0.4 INJECTION, SOLUTION INTRAMUSCULAR; INTRAVENOUS; SUBCUTANEOUS at 12:29

## 2021-02-02 RX ADMIN — DEXTROSE MONOHYDRATE 365 MG: 50 INJECTION, SOLUTION INTRAVENOUS at 12:37

## 2021-02-02 RX ADMIN — SODIUM CHLORIDE 100 ML: 9 INJECTION, SOLUTION INTRAVENOUS at 10:04

## 2021-02-02 RX ADMIN — SODIUM CHLORIDE 250 ML: 9 INJECTION, SOLUTION INTRAVENOUS at 10:02

## 2021-02-02 RX ADMIN — PALONOSETRON 0.25 MG: 0.05 INJECTION, SOLUTION INTRAVENOUS at 10:02

## 2021-02-02 RX ADMIN — DEXAMETHASONE SODIUM PHOSPHATE 12 MG: 10 INJECTION, SOLUTION INTRAMUSCULAR; INTRAVENOUS at 10:36

## 2021-02-02 RX ADMIN — BEVACIZUMAB 950 MG: 400 INJECTION, SOLUTION INTRAVENOUS at 10:57

## 2021-02-02 RX ADMIN — DEXTROSE MONOHYDRATE 810 MG: 50 INJECTION, SOLUTION INTRAVENOUS at 12:38

## 2021-02-02 RX ADMIN — FLUOROURACIL 4850 MG: 50 INJECTION, SOLUTION INTRAVENOUS at 14:19

## 2021-02-02 NOTE — PROGRESS NOTES
REASON FOR FOLLOW UP:     1. Rectal cancer, rectal ultrasound examination in July 2019 reported T3N0 disease without lymphadenopathy. She does have small pulmonary nodule 6-7 mm and 2 mm with indeterminate feature. There is no solid evidence of metastatic disease otherwise. Patient has stage IIA (T3N0M0) disease.  2. The patient is heterozygous factor V Leiden, was on prophylactic anticoagulation with Lovenox 40 mg daily given her increased risk of thrombosis with MediPort and GI malignancy.   3. PET scan on 8/8/2019 reported an 8 mm hypermetabolic right upper lobe pulmonary nodule, which is suspicious for metastatic as well.    4. Patient had a PowerPort placement on the left upper chest by Dr. Joseph on 8/9/2019.  5. Patient was started on concurrent infusional 5-FU chemoradiation therapy on 8/12/2019, with planned complete surgical resection and further adjuvant chemotherapy with intention to cure the disease.   6. Patient underwent surgical resection of the primary rectal cancer by Dr. Ye on 12/2/2019.  Pathology evaluation reported residual T3N1 disease stage IIIb.  7. Diarrhea related to therapy and radiation.   8. Patient was started cycle 1 day 1 of adjuvant FOLFOX 6 on 1/23/2020.  9. On 2/5/2020 FOLFOX 6 cycle 2 delayed secondary to neutropenia.  Patient was given 3 days of Granix injection.  After cycle #2 we planned 3 days of Granix with each cycle.   10. Patient also intolerant of oral iron.  Patient received 2 doses of IV injectafer on 02/05/2020 and 02/12/2020.   11. 02/12/2020 Proceed with cycle #2 of FOLFOX 6 with 3 days of Granix.  12. On 3/11/2020 cycle 4 postponed secondary to abdominal pain and occasional low-grade fevers.  CT scans ordered.  13. Cycle #4 resumed after CT scan revealed no evidence of disease.  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 Ye. patient has no fever.  Continue to observe.   14. Grade 3  hand-foot syndrome secondary to 5-FU after cycle #7 FOLFOX 6 chemotherapy, prompting ER visit.  Also has worsening peripheral neuropathy.   15. Cycle #8 FOLFOX 6 was given on 5/13/2020, with 50% dose reduction for both 5-FU and oxaliplatin, and also examination of bolus 5-FU.   16. PET scan examination on 5/21/2020 reported no evidence of metastatic disease in the chest abdomen pelvis.  Stable 6 mm RUL pulmonary nodule.  17. On 5/27/2020, I discussed with the patient that we can discontinue chemotherapy at this time.   18. Patient had a surgical procedure for low anterior colon resection, coloanal anastomosis on 8/3/2020.  19. CT scan for chest abdomen pelvis on 9/9/2020 reported a new 8 mm noncalcified pulmonary nodule in the left lower lobe of the lung.  Otherwise stable right upper lobe 6 mm pulmonary nodule, and no evidence of disease recurrence in the abdomen or pelvis.  20. PET scan examination on 9/18/2020 reported multiple hypermetabolic small pulmonary nodules/ new pulmonary nodules.   21. Patient was started on pelvic chemotherapy with FOLFIRI regimen on 10/13/2020.   22. 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 It reported tumor mutation burden 5 Muts/Mb, microsatellite stable, TP53 mutation R282W, and others.   23. Cycle #5 was without irinotecan, due to peripheral neuropathy.  24. 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  Patient was switched to weight-based Lovenox injection.  25. Cycle #6 5-FU and irinotecan was delayed by 2 weeks because of the above incident.    HISTORY OF PRESENT ILLNESS:  The patient is a 40 y.o. female with the above-mentioned history is here today for reevaluation, prior to resume chemotherapy cycle #8 palliative chemotherapy.      Patient reports that she had a evaluation by her thoracic surgeon at the [facility] cancer  [place].  The surgeon recommended metastectomy of the metastatic pulmonary nodule and discontinued chemotherapy.    Patient also yet waited to discuss with specialist at the [facility] cancer [place] currently scheduled on 2/11/2021.    Patient told me today that she wants to continue chemotherapy, and to finish total 12 cycles and then go from there.  If indeed [facility] specialist also recommended surgical resection of metastatic lung lesion, she would pursue after that.    As we discussed previously, patient is willing to start Avastin/bio-similar agent today.    Patient reports her swelling on her face and her right arm is significantly improved and almost resolved.  She feels a lot better this past week.  She denies pain in neck area.  No dyspnea.  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  Performance status ECOG 1.     Patient complains of bruising on her left lower abdominal wall from Lovenox injection, however she had no bruising on her right side of abdomen more.  She has some soreness at the site of bruising.  Otherwise patient reports no spontaneous bleeding such as epistaxis, hematochezia, hematuria, etc.      Patient reports no fever sweating chills.  Appetite is reasonable.     Patient reports neuropathy has improved, she only has very mild numbness tingling involving the tip of fingers and also the toes.    Laboratory studies today reported stable and normal CBC with Hb 14.4 platelets 204,000 and a WBC 4110 including ANC 2540.  She has unremarkable CMP.  Urinalysis reported a 1+ protein otherwise no evidence for infection.      Past Medical History:   Diagnosis Date   • Abdominopelvic abscess (CMS/HCC) 08/12/2020    ADMITTED TO [facility]   • Abnormal Pap smear of cervix 02/02/1998    JULIUS I   • Anemia in pregnancy    • Anxiety    • Bilateral epiphora 04/2020   • Bilateral hand swelling 05/02/2020    SEEN AT [facility] ER   • Cervical lymphadenitis  2012    SEEN AT [facility] ER   • Chemotherapy induced neutropenia (CMS/HCC) 2020   • Chemotherapy-induced nausea 2020   • Chemotherapy-induced thrombocytopenia 2020   • Chronic diarrhea    • Colon polyps     FOLLOWED BY DR. GERONIMO ESPARZA   • Cystitis 2020    WITH DEHYDRATION, ADMITTED TO [facility]   • Cystitis 10/27/2020   • Drug-induced peripheral neuropathy (CMS/HCC) 2020   • Fistula of intestine    • GERD (gastroesophageal reflux disease)    • Hand foot syndrome 2020   • Heart murmur     IN CHILDHOOD   • Hematochezia    • Hemorrhoids    • Heterozygous factor V Leiden mutation (CMS/HCC)     DX 2019   • History of anemia    • History of chemotherapy     FOLLOWED BY DR. ALEXANDRU HUNT   • History of gestational diabetes    • History of pre-eclampsia    • History of radiation therapy     FOLLOWED BY DR. JAVON LEWIS   • History of TB skin testing 2009    TB Skin Test   • HPV in female 1998   • Hypokalemia 2019   • Hypomagnesemia 2019   • IBS (irritable bowel syndrome)    • Ileostomy in place (CMS/[facility])     FOLLOWED BY DR. GERONIMO ESPARZA   • Infected insect bite of neck 2016    SEEN AT [facility]   • Kidney stones 2007    SEEN AT [facility] ER   • Lump of right breast     SWOLLEN LYMPH NODE   • Lung cancer (CMS/HCC) 2020    METASTATIC LUNG CANCER   • Monopolar depression (CMS/HCC)    • On anticoagulant therapy    • Perirectal abscess 2020   • PIH (pregnancy induced hypertension)    • Pulmonary embolism without acute cor pulmonale (CMS/HCC) 09/20/2019    X 3; ADMITTED TO [facility]   • Pulmonary nodule, right 2020   • Rectal cancer (CMS/HCC) 2019    STAGE IIA, INVASIVE MODERATELY DIFFERENTIATED ADENOCARCINOMA, CHEMO AND XRT FINISHED 2019   • Right shoulder pain 2020    SEEN AT [facility]   • Right ureteral stone 10/01/2019    SEEN AT [facility]   • SAB (spontaneous ) 1996     A2-1 INDUCED   • Sciatica    • Sepsis due to cellulitis (CMS/HCC)  09/26/2002    LEFT GREAT TOE, ADMITTED TO [facility]   • Tachycardia 04/24/2020   • Urinary urgency 03/2020     Past Surgical History:   Procedure Laterality Date   • CHOLECYSTECTOMY N/A 10/10/2003    LAPAROSCOPIC WITH CHOLANGIOGRAM, DR. JAMEY TALAVERA AT [facility]   • COLON RESECTION N/A 12/2/2019    Procedure: laparoscopic low anterior colon resection with mobilization of splenic flexure and diverting loop ileostomy: ERAS;  Surgeon: Padmaja Esparza MD;  Location: [facility];  Service: General   • COLON RESECTION N/A 8/3/2020    Procedure: LOW ANTERIOR COLON RESECTION, COLOANAL ANASTOMOSIS, MOBILIZATION SPLENIC FLEXURE;  Surgeon: Padmaja Esparza MD;  Location: [facility] OR;  Service: General;  Laterality: N/A;   • COLONOSCOPY N/A 7/15/2020    PATENT ANASTAMOSIS IN MID RECTUM, RESCOPE IN 1 YR, DR. PADMAJA ESPARZA AT [facility]   • COLONOSCOPY W/ POLYPECTOMY N/A 07/08/2019    15 MM TUBULOVILLOUS ADENOMA POLYP IN HEPATIC FLEXURE, 20 MMTUBULOVILLOUS ADENOMA WITH HIGH GRADE DYSPLASIA IN RECTOSIGMOID, 4 CM MASS IN MID RECTUM, PATH: INVASIVE MODERATELY DIFFERENTIATED ADENOCARCINOMA, DR. JENNIFER LI AT [facility]   • DILATATION AND EVACUATION N/A 2009   • ENDOSCOPY N/A 07/08/2019    LA GRADE A ESOPHAGITIS, GASTRITIS, ALL BIOPSIES BENIGN, DR. JENNIFER LI AT [facility]   • INCISION AND DRAINAGE PERIRECTAL ABSCESS N/A 8/14/2020    Procedure: INCISION AND DRAINAGE OF retrorectal dehiscence abcess with drain placement and irrigation;  Surgeon: Padmaja Esparza MD;  Location: [facility];  Service: General;  Laterality: N/A;   • PAP SMEAR N/A 01/24/2014   • SIGMOIDOSCOPY N/A 7/24/2019    INFILTRATIVE PARTIALLY OBSTRUCTING LARGE RECTAL CANCER, AREA TATOOED, DR. PADMAJA ESPARZA AT [facility]   • SIGMOIDOSCOPY N/A 11/23/2019    AN ULCERATED PARTIALLY OBSTRUCTING MASS IN MID RECTUM, AREA TATTOOED, DR. PADMAJA ESPARZA AT [facility]   • TRANSRECTAL ULTRASOUND N/A 7/24/2019    Procedure: ULTRASOUND TRANSRECTAL;  Surgeon: Padmaja Esparza MD;   Location: [facility] ENDOSCOPY;  Service: General   • URETEROSCOPY LASER LITHOTRIPSY WITH STENT INSERTION Right 10/30/2019    DR. ESTUARDO BEASLEY AT [place]   • VAGINAL DELIVERY N/A 09/18/1998   • VENOUS ACCESS DEVICE (PORT) INSERTION Left 8/9/2019    Procedure: INSERTION VENOUS ACCESS DEVICE;  Surgeon: Sj Joseph MD;  Location:  [facility];  Service: General   • VENOUS ACCESS DEVICE (PORT) INSERTION N/A 12/18/2020    Procedure: INSERTION VENOUS ACCESS DEVICE right side, removal venous access device left side;  Surgeon: Sj Joseph MD;  Location: [facility];  Service: General;  Laterality: N/A;   • WISDOM TOOTH EXTRACTION Bilateral 1993       HEMATOLOGIC/ONCOLOGIC HISTORY:      The patient reports she has intermittent rectal bleeding and urgency, mucous with her stool, starting sometime in 2016. At that time she was referred to GI service, and was diagnosed of irritable bowel syndrome. Nevertheless she had worsening urgency for bowel movement, and had a couple of incidents losing control of stool. She was recently seen by Roland Thorpe MD again and had colonoscopy and EGD exam on 07/08/2019. She was found having a circumferential rectal mass. According to the procedure note, the patient had a fungating circumferential bleeding 4 cm mass in the middle rectum, at distance between 13 cm and 17 cm from the anus. Mass was causing partial obstruction. There were also colon polyps found at the hepatic flexure and also at the rectosigmoid junction 23 cm from the anus. Both were resected and retrieved. EGD examination reported grade A esophagitis at the GE junction and patchy discontinuous erythema and aggravation of the mucosa without bleeding in the stomach body. There is normal mucosa of the duodenum. Pathology evaluation from this procedure reported moderately differentiated adenocarcinoma involving the rectal mass. The rectal sigmoid junction polyp was tubular/tubulovillous adenoma with high grade dysplasia  negative for invasive malignancy. The hepatic flexure polyp was also tubular/tubulovillous adenoma negative for high grade dysplasia or malignancy. The biopsy from the esophagus reports squamocolumnar mucosa with inflammatory changes suggestive of mild reflux esophagitis, negative for interstitial metaplasia. Gastric biopsy was benign and duodenal biopsy was also benign. There is no mention of H-pylori.     Patient was subsequently referred to colorectal surgeon Padmaja Ye MD for further evaluation. The patient had CT scan examination for chest, abdomen and pelvis requested by Dr. Ye and were done on 07/13/2019. The study reported no evidence of lymphadenopathy in the abdomen and pelvis. There was wall thickening of the rectosigmoid junction. Normal spleen, pancreas, adrenal glands and kidneys. There was fatty liver infiltration but no focal lymphatic lesions. There was a small 6-7 mm noncalcified nodule in the right upper lobe and a tiny 3 mm subpleural nodule in the right middle lobe. No mediastinal or hilar lymphadenopathy.     Dr. Ye performed staging rectal ultrasound examination. This study reported tumor penetrating through the muscularis propria as T3 disease; there were no lymph nodes identified.    She had a hospitalization in late September 2019 because of newly discovered pulmonary emboli.  She was on prophylactic Lovenox prior to the incident of PE.  Now she is on full dose Xarelto anticoagulation.  Patient reports no further chest pain dyspnea.  She denies bleeding or bruising.  During her hospitalization, she was seen by Dr. Ye, who plans to have surgery 8 to 12 weeks after finishing radiation therapy.  She finished her radiation on 9/19/2019.     I noticed patient also presented to the emergency room on 10/1/2019 complaining of right flank area pain.  I reviewed the images studies and indeed she has a very small 1 to 2 mm obstructing kidney stone in the UV junction.  Patient is still  symptomatic with some pains and dysuria.  She denies fever sweating or chills.    Laboratory study on 10/7/2019 reported normal CBC including hemoglobin 13.1, platelets 301,000, WBC 6170 and ANC 4900 lymphocytes 590 monocytes 480.      The nurse reported malfunction of port-a-catheter on 10/7/2019.  Port study on 10/8/2019 showed fibrin sheath around the distal aspect of the Mediport catheter in the SVC. This does not appear to hinder injection, but does prevent aspiration at this time.    Patient underwent surgical resection of the colon on 12/2/2019 with Dr. Ye.  Pathology evaluation reported residual T3 disease, also 1 out of 14 lymph nodes positive for malignancy.  So this patient in either had at least stage IIIb disease (T3 N1 M0?).      Adjuvant chemotherapy FOLFOX 6 will be started on 1/22/2020.  Laboratory study reported iron saturation 10%, free iron 31 TIBC 319 and ferritin 168.  Her hemoglobin was 11.8, WBC 5600, and platelets 347,000.    Patient was here on 02/12/2020 for cycle 2 of her FOLFOX.  This is delayed x1 week secondary to neutropenia.  The patient ultimately received 3 days worth of Neupogen with recovery of her blood counts.  Of note, the patient struggled with significant nausea without any episodes of vomiting following cycle #1 of chemotherapy resulting in significant weight loss.  She is up 12 pounds over the last week as her appetite has normalized.  We will add Emend to her care plan.    She is having several loose stools per her colostomy and has been hesitant to take Imodium due to prior history of constipation.  I encouraged her to try this with a maximum of 8 tablets/day.  She denies any infectious symptoms including fevers or chills.  No excess bleeding or bruising noted.  She had the expected cold sensitivity related to the Oxaliplatin for about 3 days following treatment.    Labs from 02/12/2020 demonstrated total white blood cell count of 5.14, ANC of 3.06, hemoglobin of  11.2, platelets of 211,000.  She was found to be iron deficient last week and is intolerant to oral iron secondary to GI distress.  For this reason, she initiated IV iron therapy with Injectafer last week.  She had received her second dose 02/12/2020    Patient has normalized hemoglobin 12.2 on 2/26/2020.    She reported on 5/5/2020 she had a recent visit to the emergency department for what was suspected to be an allergic reaction.  She called our on-call service on Saturday with reports of hand and face swelling.  She was instructed to proceed to the emergency department at which time she was assessed and prescribed a Medrol Dosepak.  She had just completed her 5-FU and was unhooked on Friday, 5/3/2020.  Her symptoms have improved.  She does report persistent hyperpigmentation and mild swelling of the palms of the hands but this is much improved.  It was her right hand specifically that was swollen.  Her facial swelling has resolved.  She continues on Cefdinir nd since has 1 day remaining, she was prescribed cefdinir for a UTI requiring hospitalization at the end of April.  Reports no new symptoms.  Her labs are stable.  She is scheduled for treatment again.    Patient states at this time she is not tolerating her chemotherapy well.     Patient seen in the emergency department on 5/2/2020 for what was suspected to be an allergic reaction.  She called our on-call service on Saturday explaining that she was experiencing hand and face swelling.  She was instructed to proceed to the emergency department at which time she was assessed and prescribed a Medrol Dosepak.  She had just completed her 5-FU and was unhooked on Friday, 5/1/2020.      She reports since her ED visit on 5/2/2020 her symptoms have not improved. Her hands and feet were swollen upon presenting to the ED and she could barely make a fist. She states that she feels so swollen she is not able to stand it. She states that her skin on the hands and feet  are peeling extensively as well, besides changing color to more dark.     She also reports significant fatigue after her first week of the 5-FU treatment but she expected this side effect. She also notices significant increase in her neuropathy to the point that she is not able to even walk around in her home without her house slippers due to irritation from her rugs. She denies and associated nausea or vomiting at this time. She also has episodes of epistaxis every day after the chemotherapy cycle #7.  She does report working full-time during the week of chemotherapy.    Laboratory studies, 5/13/2020, show moderate thrombocytopenia platelets 123,000, low normal WBC 4140 including ANC 2720 and normal hemoglobin 13.4.  Because significant hand-foot syndrome, will decrease both 5-FU and oxaliplatin by 50%, and eliminate bolus dose of 5-FU.    On 5/21/2020 patient had a PET scan performed which showed no convincing evidence of residual disease in abdomen or pelvis, no metastatic disease within the chest or neck.     Cycle #8 FOLFOX 6 was given on 5/13/2020, with 50% dose reduction for both 5-FU and oxaliplatin, and also examination of bolus 5-FU.  She states on 5/27/2020 that with the recent reduction of the chemotherapy she feels significantly better. She has more energy and is able to do her daily routine.      PET scan examination on 5/21/2020 reported no evidence of metastatic disease in chest abdomen pelvis.  There was a stable 6 mm right upper lobe pulmonary nodule.    Laboratory studies on 5/27/2020 showed mild leukocytopenia WBC 3720 but a normal ANC 2250 and lymphocytes 630.  Normal platelets 163,000 and hemoglobin 12.6.  Chemistry lab reported normal renal function, liver function panel and a electrolytes, elevated glucose 164.    Laboratory studies 6/24/2020, showed normal hemoglobin 13.4 but macrocytosis .9.  Normal platelets 210,000 and WBC 5870.  She had normal CMP.     Patient last time was here  in late June 2020.  Since that time she had surgical procedure for low anterior colon resection, coloanal anastomosis on 8/3/2020.  She later developed a perirectal abscess, required surgical drainage on 8/14/2020.  She was discharged on 8/27/2020.    Patient reports to me she still has lower abdominal wall vacuum suction in place.  She denies fever sweating chills.  Performance status is ECOG 1.  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  She does have visiting nurse come to the home for wound care.    CT scan for chest abdomen pelvis on 9/9/2020 reported a new 8 mm noncalcified pulmonary nodule in the left lower lobe.  Otherwise stable right upper lobe 6 mm pulmonary nodule, and no evidence of disease recurrence in the abdomen or pelvis.     Laboratory study on 9/16/2020 reported elevated AST 55, ALT 95, alk phosphatase 143 but normal total bilirubin 0.3.  Chemistry lab otherwise unremarkable.  She has completed normal CBC.      Because of the abnormal CT scan examination for chest abdomen pelvis on 9/9/2020 reported a new 8 mm noncalcified pulmonary nodule in the left lower lobe, we obtained a PET scan examination for further evaluation, which was done on 9/18/2020.  This study reported several pulmonary nodules, some of them are hypermetabolic, newly developed compared to previous PET scan in May 2020.  Certainly does highly suspicious for metastatic disease.  There are also hypermetabolic activity in the abdomen and pelvic area which is hard to differentiate from surgical scar versus metastatic malignancy.    Laboratory study on 10/13/2020 reported normal CBC with Hb 13.9, platelets 302,000 and WBC 5520 including ANC 3830.  Chemistry lab reported normalized AST 20, alk phosphatase 116 improved ALT 35, and maintains normal bilirubin, with normal electrolytes and liver function panel.     Patient was started on first cycle of palliative chemotherapy FOLFIRI on 10/13/2020.    She  recently had hospitalization from 12/21/2020 through 12/24/2020 with a new thrombus of the superior vena cava which developed after a new PowerPort catheter placement while the patient was on Xarelto.  Patient had a new port placed 12/18/2020, and had held her Xarelto for 2 days prior to surgery.  She presented to the ER on 12/21/20 with complaints of facial and arm swelling for 3 days.  She also noted increased shortness of breath.  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  Thrombus within the right internal jugular vein cannot be excluded.  Patient was started on IV heparin, and eventually transitioned to weight-based Lovenox, which she now continues.      MEDICATIONS    Current Outpatient Medications:   •  clonazePAM (KlonoPIN) 1 MG tablet, Take 1 tablet by mouth 3 (Three) Times a Day As Needed for Anxiety or Seizures., Disp: 90 tablet, Rfl: 0  •  Diclofenac Sodium (VOLTAREN) 1 % gel gel, Apply 4 g topically to the appropriate area as directed 3 (Three) Times a Day., Disp: 150 g, Rfl: 3  •  dicyclomine (BENTYL) 20 MG tablet, TAKE 1 TABLET BY MOUTH EVERY 6 HOURS AS NEEDED, Disp: 360 tablet, Rfl: 0  •  diphenoxylate-atropine (LOMOTIL) 2.5-0.025 MG per tablet, TAKE 1 TABLET BY MOUTH 4 (FOUR) TIMES A DAY AS NEEDED FOR DIARRHEA., Disp: 120 tablet, Rfl: 1  •  enoxaparin (Lovenox) 100 MG/ML solution syringe, Inject 1 mL under the skin into the appropriate area as directed Every 12 (Twelve) Hours., Disp: 60 mL, Rfl: 2  •  escitalopram (Lexapro) 10 MG tablet, Take 1 tablet by mouth Daily., Disp: 30 tablet, Rfl: 1  •  famotidine (PEPCID) 20 MG tablet, TAKE 1 TABLET BY MOUTH TWICE A DAY (Patient taking differently: Take 20 mg by mouth 2 (Two) Times a Day.), Disp: 180 tablet, Rfl: 1  •  HYDROcodone-acetaminophen (NORCO) 7.5-325 MG per tablet, Take 1 tablet by mouth Every 6 (Six) Hours As Needed for Moderate Pain ., Disp: 240 tablet, Rfl: 0  •  Loratadine (CLARITIN) 10 MG capsule, Take 10 mg  by mouth Every Evening., Disp: , Rfl:   •  ondansetron (ZOFRAN) 8 MG tablet, Take 1 tablet by mouth 3 (Three) Times a Day As Needed for Nausea or Vomiting., Disp: 60 tablet, Rfl: 5  •  prochlorperazine (COMPAZINE) 10 MG tablet, Take 1 tablet by mouth Every 6 (Six) Hours As Needed for Nausea or Vomiting., Disp: 30 tablet, Rfl: 2  No current facility-administered medications for this visit.     Facility-Administered Medications Ordered in Other Visits:   •  atropine injection 0.25 mg, 0.25 mg, Intravenous, Q15 Min PRN, Dong Nguyen MD PhD  •  bevacizumab (AVASTIN) 950 mg in sodium chloride 0.9 % 138 mL chemo IVPB, 10 mg/kg (Treatment Plan Recorded), Intravenous, Once, Dong Nguyen MD PhD  •  fluorouracil (ADRUCIL) 4,850 mg, sodium chloride 0.9 % 143 mL 240 mL chemo infusion - FOR HOME USE, 2,400 mg/m2 (Treatment Plan Recorded), Intravenous, Once, Dong Nguyen MD PhD  •  irinotecan (CAMPTOSAR) 365 mg in dextrose (D5W) 5 % 518.3 mL chemo IVPB, 180 mg/m2 (Treatment Plan Recorded), Intravenous, Once, Dong Nguyen MD PhD  •  leucovorin 810 mg in dextrose (D5W) 5 % 290.5 mL IVPB, 400 mg/m2 (Treatment Plan Recorded), Intravenous, Once, Dong Nguyen MD PhD    ALLERGIES:   No Known Allergies    SOCIAL HISTORY:       Social History     Tobacco Use   • Smoking status: Former Smoker     Packs/day: 1.00     Years: 24.00     Pack years: 24.00     Types: Electronic Cigarette, Cigarettes   • Smokeless tobacco: Never Used   • Tobacco comment: LAST CIGARETTE 8/12/2020   Substance Use Topics   • Alcohol use: Not Currently   • Drug use: No         FAMILY HISTORY:   Mother has positive factor V Leiden mutation, although she did not have thrombosis, mother also is coronary disease with stenting, she also is occasional bruising.  Maternal grandmother had DVT, she had multiple surgical procedures.  Patient mother's half-brother had metastatic colon cancer at diagnosis in his 50s.  Maternal great aunt has breast cancer.   "Patient will follow his skin cancer in his 60s, exclusive.    REVIEW OF SYSTEMS:  Review of Systems   Constitutional: Negative for activity change, appetite change, chills, diaphoresis, fatigue, fever and unexpected weight change.   HENT: Negative for congestion, hearing loss, mouth sores, nosebleeds and trouble swallowing.    Eyes: Negative for photophobia and visual disturbance.   Respiratory: Negative for cough, chest tightness and shortness of breath (This is almost resolved as of 1/5/2020.).    Cardiovascular: Negative for chest pain, palpitations and leg swelling.   Gastrointestinal: Negative for abdominal distention, abdominal pain, anal bleeding, constipation, diarrhea and nausea.   Endocrine: Negative for cold intolerance and heat intolerance.   Genitourinary: Negative for dysuria and hematuria.   Musculoskeletal: Negative for arthralgias, back pain, joint swelling and neck pain.   Skin: Negative for color change, pallor and wound.   Allergic/Immunologic: Positive for immunocompromised state (Secondary to adjuvant chemotherapy). Negative for environmental allergies and food allergies.   Neurological: Positive for numbness (Mild numbness involving fingertips and toes). Negative for dizziness, weakness and headaches.   Hematological: Negative for adenopathy. Bruises/bleeds easily (At the site of Lovenox injection only the left abdominal wall).   Psychiatric/Behavioral: Negative for agitation, confusion, dysphoric mood (Depression under control with medication) and suicidal ideas. The patient is nervous/anxious.               Vitals:    02/02/21 0851   BP: 133/90   Pulse: 88   Resp: 16   Temp: 97.7 °F (36.5 °C)   SpO2: 93%   Weight: 98 kg (216 lb)   Height: 166 cm (65.35\")   PainSc: 0-No pain     Current Status 2/2/2021   ECOG score 0         Physical Exam  Vitals signs reviewed.   Constitutional:       General: She is not in acute distress.     Appearance: Normal appearance. She is well-developed.      " Comments: Seated in wheelchair, accompanied by her mom.   HENT:      Head: Normocephalic and atraumatic.      Comments: Facial swelling improved  Eyes:      General: No scleral icterus.     Conjunctiva/sclera: Conjunctivae normal.      Pupils: Pupils are equal, round, and reactive to light.   Neck:      Musculoskeletal: Neck supple.   Cardiovascular:      Rate and Rhythm: Normal rate and regular rhythm.      Heart sounds: Normal heart sounds. No murmur.   Pulmonary:      Effort: Pulmonary effort is normal. No respiratory distress.      Breath sounds: Normal breath sounds. No wheezing, rhonchi or rales.   Chest:      Comments: Benign-appearing right chest Mediport.  Bruising bilaterally on the upper chest.  Abdominal:      General: Bowel sounds are normal. There is no distension.      Palpations: Abdomen is soft.      Comments: Ileostomy in the right lower abdomen   Musculoskeletal:      Right lower leg: No edema.      Left lower leg: No edema.   Skin:     General: Skin is warm and dry.      Findings: No rash.      Comments: Large bruise at the left lower quadrant abdominal wall, about 4 x 10 cm area   Neurological:      Mental Status: She is alert and oriented to person, place, and time. Mental status is at baseline.   Psychiatric:         Mood and Affect: Mood normal.         Thought Content: Thought content normal.       RECENT LABS:    Lab Results   Component Value Date    WBC 4.11 02/02/2021    HGB 14.4 02/02/2021    HCT 42.4 02/02/2021    MCV 98.6 (H) 02/02/2021     02/02/2021     Lab Results   Component Value Date    NEUTROABS 2.54 02/02/2021     Glucose   Date Value Ref Range Status   02/02/2021 97 74 - 124 mg/dL Final     BUN   Date Value Ref Range Status   02/02/2021 10 6 - 20 mg/dL Final     Creatinine   Date Value Ref Range Status   02/02/2021 0.74 0.60 - 1.10 mg/dL Final   09/09/2020 0.60 0.60 - 1.30 mg/dL Final     Comment:     Serial Number: [id number]:  [id number]     Sodium   Date Value Ref  Range Status   02/02/2021 137 134 - 145 mmol/L Final     Potassium   Date Value Ref Range Status   02/02/2021 4.1 3.5 - 4.7 mmol/L Final     Chloride   Date Value Ref Range Status   02/02/2021 104 98 - 107 mmol/L Final     CO2   Date Value Ref Range Status   02/02/2021 24.4 22.0 - 29.0 mmol/L Final     Calcium   Date Value Ref Range Status   02/02/2021 9.3 8.5 - 10.2 mg/dL Final     Total Protein   Date Value Ref Range Status   02/02/2021 6.8 6.3 - 8.0 g/dL Final     Albumin   Date Value Ref Range Status   02/02/2021 3.70 3.50 - 5.20 g/dL Final     ALT (SGPT)   Date Value Ref Range Status   02/02/2021 39 (H) 0 - 33 U/L Final     AST (SGOT)   Date Value Ref Range Status   02/02/2021 21 0 - 32 U/L Final     Alkaline Phosphatase   Date Value Ref Range Status   02/02/2021 97 38 - 116 U/L Final     Total Bilirubin   Date Value Ref Range Status   02/02/2021 0.2 0.2 - 1.2 mg/dL Final     eGFR Non  Amer   Date Value Ref Range Status   02/02/2021 86 >60 mL/min/1.73 Final     BUN/Creatinine Ratio   Date Value Ref Range Status   02/02/2021 13.5 7.3 - 30.0 Final     Anion Gap   Date Value Ref Range Status   02/02/2021 8.6 5.0 - 15.0 mmol/L Final     Lab Results   Component Value Date    CEA 1.32 09/16/2020     IMAGING:     F-18 FDG PET FROM SKULL BASE TO MID THIGH WITH PET/CT FUSION 1/12/2021     HISTORY: Rectal cancer, status post chemotherapy; restaging     TECHNIQUE: Radiation dose reduction techniques were utilized, including  automated exposure control and exposure modulation based on body size.   Blood glucose level at time of injection was 110 mg/dL.  6.1 mCi of F-18  FDG were injected and PET was performed from skull base to mid thigh. CT  was obtained for localization and attenuation correction. Time at  injection 0718. PET start time 0834.      Compared with PET CT 09/18/2020 and multiple CT chest, abdomen and  pelvis dating back to 03/13/2020     FINDINGS:      There is no cervical adenopathy demonstrating FDG  uptake significantly  above that of blood pool. The thyroid, submandibular and parotid glands  have a roughly symmetric FDG uptake. Loculated hypodense collection  within the left chest wall in the area of the previously seen port  measures 1.9 cm and demonstrates mild-to-moderate FDG uptake with a max  SUV of 4.9. Of note, the patient had a new port placed on 12/18/2020 and  removal of the left port..     Hypermetabolic right hilar adenopathy is present and measures  approximately 1.1 cm in short axis dimension, grossly unchanged since  12/21/2020. It demonstrates a max SUV of 5.     There is no mediastinal or axillary adenopathy demonstrating FDG uptake  significantly above that of blood pool.     Heart is normal in size.     Bilateral pulmonary nodules are present, as before, with index nodules  as below:  *  The right upper lobe pulmonary nodule measures 0.6 cm, grossly  unchanged since 09/18/2020. However, it has significantly decreased in  degree of FDG uptake with a max SUV of 1.3 (previously 4).  *  Sub-6 mm pulmonary nodule within the left upper lobe is grossly  unchanged over multiple prior CTs.  *  The previously seen FDG avid 0.8 cm pulmonary nodule within the left  lower lobe is no longer visualized.     No new suspicious FDG avid pulmonary nodules are visualized. The degree  of pulmonary consolidation within the right base has improved since  12/23/2020 with resolution of the small right pleural effusion.  Persistent patchy areas of groundglass opacification within the  bilateral lungs have essentially resolved.     No focal FDG avid abnormality is visualized within the liver, pancreas,  spleen, adrenal glands or kidneys. Marked hepatic steatosis present.  Gallbladder is surgically absent. There is no hydronephrosis.     There is no abdominopelvic adenopathy demonstrating FDG uptake  significantly above that of blood pool. Postsurgical changes from  diverting right lower quadrant ileostomy are present.  The degree of FDG  uptake within the midline surgical scar and right lower quadrant  ileostomy has decreased. Additionally, the degree of FDG uptake within  the relatively symmetric presacral soft tissue thickening has also  decreased with a max SUV of 5.6 (previously 12.4). The previously seen  air within the presacral soft tissues is no longer visualized. While a  discrete measurable fluid collection is not visualized, it cannot be  excluded in the absence of intravenous contrast. Presacral soft tissue  thickening measures up to 2.4 cm, as before. No free intraperitoneal air  is present.     There are no suspicious FDG avid lytic or blastic osseous lesions.     IMPRESSION:  1.  Interval decrease in FDG uptake within the right upper lobe  pulmonary nodule. Previously seen FDG avid left lower lobe pulmonary  nodule has resolved.  2.  Mild to moderately FDG avid right hilar node which is grossly  unchanged since 12/21/2020. The previously seen bilateral groundglass  opacification and right pleural effusion have resolved with improvement  in the degree of right basilar pulmonary consolidation since 12/23/2020.  While the adenopathy may be reactive, continued close attention on  follow-up is recommended to exclude metastatic disease.  3.  Resolution of the air within the presacral soft tissues with overall  decrease in the degree of FDG uptake within the operative bed of the  pelvis and subcutaneous tissues as detailed above.  4.  Postsurgical changes from recent left Port-A-Cath removal are  present with probable seroma in the operative bed, the sterility of  which cannot be determined based on imaging and correlation with patient  history and physical exam is recommended to exclude abscess.  5.  Other findings as above.        Assessment/Plan      ASSESSMENT:   1.  Rectal cancer, rectal ultrasound examination reported T3N0 disease without lymphadenopathy.   · CT scan of chest, abdomen and pelvis reported no  lymphadenopathy in the abdomen, pelvis or chest. She does have small pulmonary nodule 6-7 mm and 2 mm with indeterminate feature. There is no solid evidence of metastatic disease otherwise.   · Based on the CT scan and rectal ultrasound imaging studies, this patient had stage IIA (T3N0M0) disease.    · She had PET scan examination on 8/8/2019 which reported a hypermetabolic right upper lobe pulmonary nodule 6 mm with SUV 5.6.  This is suspicious for metastatic disease however it is too small to be biopsied.  This patient may have stage IV disease.   · She initiated concurrent radiation with continuous 5-FU on 8/12/2019.  · Patient finished concurrent chemoradiation therapy.  · Patient underwent surgical resection of the rectal tumor and diverting loop ileostomy on 12/2/2019 with Dr. Ye.  Pathology evaluation reported residual T3 disease, with 1 out of 14 lymph nodes positive for malignancy.  Certainly this patient has at least stage IIIb rectal cancer (T3 N1 M0?)  · On 1/22/2020 adjuvant chemotherapy FOLFOX 6 regimen initiated.    · On 2/5/2022 cycle #2 was delayed secondary to neutropenia.  She was given 3 days of Granix.   · Emend added with cycle 3.  With improved nausea control  · Continuing home Granix x3 days following 5-FU disconnect  · 3/11/2020 due for cycle 4, however, she is experiencing progressive abdominal pain and occasional fevers.   · CT scan performed on 3/13/2020 reveals no evidence of progressive or recurrent disease.  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  · Patient hospitalized 4/24-4/26/20 after cycle 5 of chemotherapy with acute UTI.  CT abdomen/pelvis noting fluid collection in the presacral region having diminished in size compared to CT dated 3/13/2020.  Patient was evaluated by Dr. Ye while in the hospital with further plans to evaluate possible colovaginal fistula following completion of chemotherapy.  Patient did respond to IV antibiotics and discharged home on oral  cefdinir.  · 4/29/2020 cycle 6 of FOLFOX.  Urinary symptoms have resolved   · Patient seen in the [facility] ED on 5/2/2020 for what was suspected to be an allergic reaction.  She called our service on Saturday explaining that she was experiencing hand and face swelling.  She was instructed to proceed to the emergency department at which time she was assessed and prescribed a Medrol Dosepak.  She had just completed her 5-FU and was unhooked on Friday, 5/1/2020.  Her symptoms have resolved in the office on assessment, 5/5/2020.  · The patient had grade 3 hand-foot syndrome based on symptomology.  Patient had cycle #8 of 5-FU on 5/13/2020. Due to her symptoms and poor tolerance to the 5-FU, her treatment dose will be reduced 50% for oxaliplatin and infusional 5-FU.  Bolus 5-FU will be discontinued..  · On 5/13/2020  We discussed further treatment options pending the scan results.  If she has indeed residual disease or metastatic disease, we will switch her to irinotecan plus Avastin or anti-EGFR monoclonal antibody.  She will need a further molecular testing of her tumor samples in that case.  · On 5/21/2020 patient had a PET scan and it showed no evidence of of metastatic disease in the neck, chest, abdomen or pelvis.  There was fluid accumulation/abscess.   · On 5/27/2020 I discussed with the patient that we can discontinue chemotherapy at this time.  She will follow-up with Dr. Ye to discuss a possibility to reverse the ileostomy.  We likely will obtain anther PET scan in 3 to 4 months for reassessment.    · Patient was seen by Dr. Ye on 6/19/2019 for evaluation and to discuss possible take down of her ileostomy.  She is scheduled to have a gastrografin enema on 7/2/2020 to evaluate for a fistula, and then a colonoscopy on 7/15/2020, both done by Dr. Ye.  She states that based on the above imaging and procedure findings, she may have a more extensive surgery done or just have her ileostomy reversed,  which would likely be done in August 2020.  This will all be coordinated under the care of Dr. Ye.     · I reviewed scan results with the patient on 9/16/2020 for the most recent CT scan from 09/09/2020 and also compared it to her previous PET scan examination from 05/21/2020 and also the original PET scan from 08/09/2019.  The original PET scan there is a small right upper lobe pulmonary nodule 6 mm with SUV 5.6. So in the 05/2020 PET scan the nodule is still there but activity seems much less and this most recent CT scan examination also documented the preexisting small pulmonary nodule however there is a new left lower lobe pulmonary nodule 8 mm in size and I shared with the patient today this nodule is suspicious for metastatic disease. Laboratory studies reported minimal CEA level 1.32 on 09/09/2020. Liver function panel was only marginally abnormal with the ALT 45, the remaining is normal. However laboratory study today showed worsening AST 55, ALT 95 and alkaline phosphatase 143 but is still normal, total bilirubin 0.3.   · So I discussed with the patient on 9/16/2020 recommended to have repeat PET scan examination for assessment because the left lower lobe pulmonary nodule is too small to be biopsied, and if the PET scan reports hypermetabolic lesion, I recommended to have stereotactic radiation therapy. Explained to patient that she is not a really good candidate to have thoracotomy for resection because she still has unhealed wound in the abdomen. I think the stereotactic radiation therapy will be a more feasible choice.  · Laboratory study on 9/16/2020 reported worsening liver function panel with both elevated AST, ALT and also slightly worsened alkaline phosphatase despite having normal total bilirubin. I recommended to repeat laboratory study for reevaluation. The only new medication she has in the past several days is Augmentin but otherwise no change of condition. She denies any recent viral  infection. We will monitor this.   · PET scan examination obtained on 9/18/2020 showed metastatic disease.  It reported a few hypermetabolic pulmonary nodules, and some new small pulmonary nodules, all of them highly suspicious for metastatic disease.  She also has hypermetabolic activity in the abdomen and pelvic area which could be related to scar tissue from her recent surgery versus metastatic disease.  Nevertheless overall picture fits with metastatic cancer.  · I discussed with the patient on 9/20/2020, we reviewed the PET scan images together, and I recommended to have systematic chemotherapy, I do not think stereotactic reading therapy to the hypermetabolic lesions in the lungs warranted at this time because even those will be treated with reading therapy, she will still need systematic chemotherapy.  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  I did discuss with the patient using anti-EGFR monoclonal antibody versus anti-VEGF monoclonal antibody.     · Palliative chemotherapy cycle#1 FOLFIRI was started on 10/13/2020.  No bolus 5-FU given considering previous poor tolerance.  Genetic studies still pending.   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  Microsatellite stable, mutation burden 5/Mb.    · Discussed with the patient 10/27/2020, because of the K-[place] mutation, she is not a candidate for anti-EGFR monoclonal antibody such as Erbitux or vectibix.  She could be a candidate for anti-VEGF monoclonal antibody, however because of active wound, she is not a candidate right now at this moment.  · Patient seen in the ED for acute right neck pain on 11/16/2020.  CT angiogram as well as CT of the cervical spine performed with no acute findings but notably one of her pulmonary nodules, left upper lobe, somewhat decreased in size.  Patient given prednisone pack.  Further details below.  · Patient due today for cycle #4 FOLFIRI.  Plan repeat PET  scan after cycle 6.  · Last received cycle #5 chemotherapy without irinotecan on 12/8/2020.   · Patient here 12/29/2020 for evaluation and consideration of cycle 6, but she continues to complain of swelling.  She does have swelling typically after treatment as well.  We will hold treatment for 1 week and reevaluate next week.  Still planning on PET scan following cycle 6.   · Cycle #6 chemotherapy will be resumed on 1/5/2021.  Started back on original irinotecan.  · PET scan examination obtained on 1/12/2021 after cycle #6 chemotherapy reported improved a pulmonary nodule hypermetabolic activity.  Confirmed these are metastatic lesions.  · Patient is evaluated 1/19/2020, will continue cycle #7 FOLFIRI chemotherapy.  However Avastin will be on hold see next section.   · Patient was reevaluated on 2/2/2021, will continue chemotherapy cycle #8 FOLFIRI.  Avastin / biosimilar will be added.  Patient decides to finish up chemotherapy.  She is still waiting to talk to [facility] specialist before making final decision for resection of metastatic lung lesion.     2 .  Pulmonary nodule, hypermetabolic on PET scan.   · Her original PET scan examination from 8/8/2019 reported a small 8 mm right upper apex pulmonary nodule but hypermetabolic SUV 5.1.  That was too small to be biopsied, however there was always a possibility of metastatic disease considering that high activity despite a such a small nodule.  · Her chest CT scan examination from 3/13/2020 reported this shrank to 6 mm.    · PET scan examination on 5/21/2020 reported no metabolic activity at this residual nodule.  This needs to be monitored.    · PET scan examination 9/18/2020 reported couple of hypermetabolic pulmonary nodules, besides a few extra small pulmonary nodules.  Those are highly suspicious for metastatic disease.    · PET scan examination obtained on 1/12/2021 after cycle #6 chemotherapy reported improved a pulmonary nodule  hypermetabolic activity.  Confirmed these are metastatic lesions.  · 1/19/2021, patient reports she will see thoracic surgeon tomorrow at the [facility] where she seeks second opinion evaluation, and to see whether she would be a candidate for resection of pulmonary nodules.  I discussed with the patient, she has at least 2 hypermetabolic lesions although they are responded to chemotherapy, I do not think she would be a candidate for surgery.   · Thoracic surgeon from [facility] recommended metastectomy and to discontinue chemotherapy afterwards.   · Patient is waiting to discuss with [facility] cancer [facility] specialist scheduled on 2/11/2021.    3.   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  Low-dose Lovenox discontinued and initiated Xarelto 20 mg daily.  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  · Patient held Xarelto for 2 days prior to MediPort removal from the left chest wall and placement of new port in the right chest wall on 12/18/2020.  She resumed Xarelto that evening.  · Progressive swelling in the neck, face, upper extremities prompting hospitalization with CT scan showing thrombosis in the right subclavian vein and SVC.  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  Although she had been off of Xarelto for a few days, clearly Xarelto was not prohibiting progression of  her thrombosis after she resumed treatment and there was some evidence to suggest thrombosis had been present at least in the innominate vein on prior scan in September but now appears chronic.  Current acute thrombosis involving SVC and right subclavian.  · Patient was admitted and placed on heparin drip  · Patient was evaluated by vascular surgery and intervention was not recommended for thrombolysis/thrombectomy.  · On 12/22/2020, the patient developed worsening shortness of breath, increasing upper extremity edema consistent with worsening SVC syndrome.  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  · Symptoms improved and patient was discharged 12/24/2020 on Lovenox 100 mg every 12 hours.    · Returns 12/29/2020 for evaluation continuing Lovenox, overall tolerating it well.  No bleeding issues.  · Improved edema 1/5/2021.  Will continue Lovenox weight-based every 12 hours.  · On 1/19/2021 and 2/2/2021, patient reports improved facial swelling.  She however does have being bruise on her abdomen wall where she does Lovenox injection.  However she does not have a spontaneous epistaxis, gum bleeding or other bleeding.   · On 2/2/2021, we discussed that side effects of Avastin/biosimilar related to thrombosis.  Since this patient already has been on Lovenox, I think the benefits from treatment for her cancer will outweigh that risk of thrombosis, will proceed ahead with Avastin.  I cautioned patient to watch out for signs of worsening thrombosis patient voiced understanding    4.  This patient also has strong family history for malignancy the patient had appointment with genetic counseling on September 3, 2019.  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    5.  Mild anemia, improved since her surgery.    · She also has a history of iron deficiency.  Iron studies reveal a saturation of just 10%.  She  was started on oral iron but was unable to tolerate due to GI side effects.   · Status post Injectafer 2/5/2020 and 2/12/2020   · Improved hemoglobin 13.5 on 1/5/2021.  · Hemoglobin 14.4 on 2/2/2021.  Continue to monitor.    6.  Peripheral neuropathy secondary to chemotherapy.    · This is mild involving bilateral hands and feet as reported on 9/16/2020.   · Will avoid chemotherapy with oxaliplatin.  · Stable mild neuropathy as of 11/10/2020  · Patient reports worsening peripheral neuropathy and cycle #5 chemotherapy on 12/8/2020 with and without irinotecan.   · On 1/5/2021, patient reports improvement of peripheral neuropathy, will add irinotecan back to chemotherapy.  Continue to monitor and adjust medication.  · On 1/19/2021, patient reports no worsening of peripheral neuropathy after restarting irinotecan    7.  History of hand-foot syndrome grade 3.    · It become so significant after cycle #7 FOLFOX treatment.  Discussed with patient on 5/13/2020, will discontinue the bolus 5-FU dose, and also decrease 50% of the infusion dose through the pump.   · We also use Medrol Dosepak for possible recurrent symptomology.  She responded this well.   · Her hand-foot syndrome improved.  · Under current treatment with FOLFIRI, patient not receiving 5-FU bolus.  No recurrent issues.    8.  Hyperlacrimation and mild scleral irritation related to 5-FU.  She has been taking steroid eyedrops.  This has improved her hyperlacrimation.  · Not currently an issue.  Continue to monitor with 5-FU.      9.  Abnormal liver function panel.  · Improved liver function panel 9/18/2020.  This is probably related to her recent infection.  · She has normalized AST, alk phosphatase, and a much improved only marginally elevated ALT 35 on 10/13/2020.    · Normalized liver function panel on 10/27/2020.    · Normal liver panel on 11/10/2020.    · Normal liver function panel on 12/5/2021.    · Slightly worsening liver function with AST 33 and ALT 56  1/19/2021.    · Improved AST 21 and ALT 39 on 2/2/2021.  Continue to monitor.    10.  Intermittent leukocytopenia secondary to chemotherapy.   · WBC currently normal at 5220 and the neutrophil 3520 on 1/5/2021.  Continue to monitor.    · On 2/2/2021, WBC 4110 including ANC 2540.  Continue to monitor for now.  Consider G-CSF if neutropenia becomes an ongoing issue.      11.  Depression.  Patient seen by JULIET Davenport on 11/9/2020.  She was started on mirtazapine.  Lexapro was discontinued.  This has definitely improved her mood with the patient feeling overall much better.  She is sleeping better.  Appetite is improved with her actually eating more than she wants to.  She plans to continue follow-up with supportive oncology.    12.  Right neck/shoulder pain occurring twice now.  Patient reports that this occurred with both cycle #2 and cycle #3.  She did not mention it with cycle #2.  She did mention it was cycle #3 and per review of the EMR nursing note it was during irinotecan infusion.  Pain then subsided.  However it returned 5 days later with the patient going to the ED on 11/16/2020.  As imaging details are noted above, no acute findings were found.  Etiology remains unclear.  Patient's Mediport is in the opposite side of her chest.  She was given a prednisone taper which she continues at this time.  Apparently it was not related to irinotecan.     13.  Intermittent upper abdominal discomfort.  This improves with eating.  After further discussion with the patient she also notes 3 nights of increased reflux when laying down.  We discussed her symptoms could be related to increase stomach acid or potential ulcer.  She is currently taking Pepcid 20 mg daily.  I instructed her to add Prilosec 20 mg daily.         PLAN:  1. Proceed with cycle #8 palliative chemotherapy today, with infusional 5-FU and irinotecan.   2. Will initiate Avastin biosimilar today starting with cycle 8 chemotherapy and repeat every 2  weeks.    3. Keep appointment with [facility] 2/11/2021  4. Continue Lovenox 100 mg SQ every 12 hours for anticoagulation.   5. Patient return in 2 weeks for follow-up with APRN prior to cycle #9 chemotherapy.  6. Return in 4 weeks for follow-up visit with me prior to cycle #10 chemotherapy.          Dong Nguyen MD PhD  02/02/21        CC:  Deepika Vela III NPMD Perla Delgado MD

## 2021-02-02 NOTE — NURSING NOTE
Printed information from [employer] provided for Avastin. Previous  teaching reinforced. Questions answered and written consent obtained.

## 2021-02-04 ENCOUNTER — INFUSION (OUTPATIENT)
Dept: ONCOLOGY | Facility: HOSPITAL | Age: 41
End: 2021-02-04

## 2021-02-04 DIAGNOSIS — Z45.2 ENCOUNTER FOR FITTING AND ADJUSTMENT OF VASCULAR CATHETER: ICD-10-CM

## 2021-02-04 DIAGNOSIS — C20 ADENOCARCINOMA OF RECTUM (HCC): Primary | ICD-10-CM

## 2021-02-04 LAB
LAB AP CASE REPORT: NORMAL
LAB AP DIAGNOSIS COMMENT: NORMAL
LAB AP INTRADEPARTMENTAL CONSULT: NORMAL
LAB AP SYNOPTIC CHECKLIST: NORMAL
Lab: NORMAL
PATH REPORT.ADDENDUM SPEC: NORMAL
PATH REPORT.FINAL DX SPEC: NORMAL
PATH REPORT.GROSS SPEC: NORMAL

## 2021-02-04 PROCEDURE — 25010000003 HEPARIN LOCK FLUSH PER 10 UNITS: Performed by: INTERNAL MEDICINE

## 2021-02-04 RX ORDER — HEPARIN SODIUM (PORCINE) LOCK FLUSH IV SOLN 100 UNIT/ML 100 UNIT/ML
500 SOLUTION INTRAVENOUS AS NEEDED
Status: CANCELLED | OUTPATIENT
Start: 2021-02-04

## 2021-02-04 RX ORDER — SODIUM CHLORIDE 0.9 % (FLUSH) 0.9 %
10 SYRINGE (ML) INJECTION AS NEEDED
Status: DISCONTINUED | OUTPATIENT
Start: 2021-02-04 | End: 2021-02-04 | Stop reason: HOSPADM

## 2021-02-04 RX ORDER — HEPARIN SODIUM (PORCINE) LOCK FLUSH IV SOLN 100 UNIT/ML 100 UNIT/ML
500 SOLUTION INTRAVENOUS AS NEEDED
Status: DISCONTINUED | OUTPATIENT
Start: 2021-02-04 | End: 2021-02-04 | Stop reason: HOSPADM

## 2021-02-04 RX ORDER — SODIUM CHLORIDE 0.9 % (FLUSH) 0.9 %
10 SYRINGE (ML) INJECTION AS NEEDED
Status: CANCELLED | OUTPATIENT
Start: 2021-02-04

## 2021-02-04 RX ADMIN — Medication 500 UNITS: at 10:27

## 2021-02-04 RX ADMIN — SODIUM CHLORIDE, PRESERVATIVE FREE 10 ML: 5 INJECTION INTRAVENOUS at 10:26

## 2021-02-16 ENCOUNTER — OFFICE VISIT (OUTPATIENT)
Dept: ONCOLOGY | Facility: CLINIC | Age: 41
End: 2021-02-16

## 2021-02-16 ENCOUNTER — INFUSION (OUTPATIENT)
Dept: ONCOLOGY | Facility: HOSPITAL | Age: 41
End: 2021-02-16

## 2021-02-16 VITALS
TEMPERATURE: 97.7 F | RESPIRATION RATE: 19 BRPM | HEIGHT: 65 IN | DIASTOLIC BLOOD PRESSURE: 90 MMHG | OXYGEN SATURATION: 95 % | WEIGHT: 215.7 LBS | HEART RATE: 100 BPM | BODY MASS INDEX: 35.94 KG/M2 | SYSTOLIC BLOOD PRESSURE: 139 MMHG

## 2021-02-16 DIAGNOSIS — C20 ADENOCARCINOMA OF RECTUM (HCC): Primary | Chronic | ICD-10-CM

## 2021-02-16 DIAGNOSIS — C20 ADENOCARCINOMA OF RECTUM (HCC): Primary | ICD-10-CM

## 2021-02-16 DIAGNOSIS — C20 ADENOCARCINOMA OF RECTUM (HCC): ICD-10-CM

## 2021-02-16 LAB
ALBUMIN SERPL-MCNC: 3.9 G/DL (ref 3.5–5.2)
ALBUMIN/GLOB SERPL: 1.2 G/DL (ref 1.1–2.4)
ALP SERPL-CCNC: 108 U/L (ref 38–116)
ALT SERPL W P-5'-P-CCNC: 57 U/L (ref 0–33)
ANION GAP SERPL CALCULATED.3IONS-SCNC: 11.3 MMOL/L (ref 5–15)
AST SERPL-CCNC: 28 U/L (ref 0–32)
BASOPHILS # BLD AUTO: 0.01 10*3/MM3 (ref 0–0.2)
BASOPHILS NFR BLD AUTO: 0.2 % (ref 0–1.5)
BILIRUB SERPL-MCNC: 0.2 MG/DL (ref 0.2–1.2)
BILIRUB UR QL STRIP: ABNORMAL
BUN SERPL-MCNC: 6 MG/DL (ref 6–20)
BUN/CREAT SERPL: 7.1 (ref 7.3–30)
CALCIUM SPEC-SCNC: 9.6 MG/DL (ref 8.5–10.2)
CHLORIDE SERPL-SCNC: 104 MMOL/L (ref 98–107)
CLARITY UR: CLEAR
CO2 SERPL-SCNC: 24.7 MMOL/L (ref 22–29)
COLOR UR: YELLOW
CREAT SERPL-MCNC: 0.85 MG/DL (ref 0.6–1.1)
DEPRECATED RDW RBC AUTO: 56.2 FL (ref 37–54)
EOSINOPHIL # BLD AUTO: 0.24 10*3/MM3 (ref 0–0.4)
EOSINOPHIL NFR BLD AUTO: 5.2 % (ref 0.3–6.2)
ERYTHROCYTE [DISTWIDTH] IN BLOOD BY AUTOMATED COUNT: 15.5 % (ref 12.3–15.4)
GFR SERPL CREATININE-BSD FRML MDRD: 74 ML/MIN/1.73
GLOBULIN UR ELPH-MCNC: 3.3 GM/DL (ref 1.8–3.5)
GLUCOSE SERPL-MCNC: 134 MG/DL (ref 74–124)
GLUCOSE UR STRIP-MCNC: NEGATIVE MG/DL
HCT VFR BLD AUTO: 44.7 % (ref 34–46.6)
HGB BLD-MCNC: 14.7 G/DL (ref 12–15.9)
HGB UR QL STRIP.AUTO: ABNORMAL
IMM GRANULOCYTES # BLD AUTO: 0.01 10*3/MM3 (ref 0–0.05)
IMM GRANULOCYTES NFR BLD AUTO: 0.2 % (ref 0–0.5)
KETONES UR QL STRIP: NEGATIVE
LEUKOCYTE ESTERASE UR QL STRIP.AUTO: ABNORMAL
LYMPHOCYTES # BLD AUTO: 1.12 10*3/MM3 (ref 0.7–3.1)
LYMPHOCYTES NFR BLD AUTO: 24.3 % (ref 19.6–45.3)
MCH RBC QN AUTO: 32.7 PG (ref 26.6–33)
MCHC RBC AUTO-ENTMCNC: 32.9 G/DL (ref 31.5–35.7)
MCV RBC AUTO: 99.6 FL (ref 79–97)
MONOCYTES # BLD AUTO: 0.43 10*3/MM3 (ref 0.1–0.9)
MONOCYTES NFR BLD AUTO: 9.3 % (ref 5–12)
NEUTROPHILS NFR BLD AUTO: 2.79 10*3/MM3 (ref 1.7–7)
NEUTROPHILS NFR BLD AUTO: 60.8 % (ref 42.7–76)
NITRITE UR QL STRIP: NEGATIVE
NRBC BLD AUTO-RTO: 0 /100 WBC (ref 0–0.2)
PH UR STRIP.AUTO: 6 [PH] (ref 4.5–8)
PLATELET # BLD AUTO: 240 10*3/MM3 (ref 140–450)
PMV BLD AUTO: 8.6 FL (ref 6–12)
POTASSIUM SERPL-SCNC: 4.3 MMOL/L (ref 3.5–4.7)
PROT SERPL-MCNC: 7.2 G/DL (ref 6.3–8)
PROT UR QL STRIP: ABNORMAL
RBC # BLD AUTO: 4.49 10*6/MM3 (ref 3.77–5.28)
SODIUM SERPL-SCNC: 140 MMOL/L (ref 134–145)
SP GR UR STRIP: >=1.03 (ref 1–1.03)
UROBILINOGEN UR QL STRIP: ABNORMAL
WBC # BLD AUTO: 4.6 10*3/MM3 (ref 3.4–10.8)

## 2021-02-16 PROCEDURE — 81003 URINALYSIS AUTO W/O SCOPE: CPT

## 2021-02-16 PROCEDURE — 85025 COMPLETE CBC W/AUTO DIFF WBC: CPT

## 2021-02-16 PROCEDURE — 25010000002 PALONOSETRON PER 25 MCG: Performed by: NURSE PRACTITIONER

## 2021-02-16 PROCEDURE — 96416 CHEMO PROLONG INFUSE W/PUMP: CPT

## 2021-02-16 PROCEDURE — 25010000002 DEXAMETHASONE SODIUM PHOSPHATE 100 MG/10ML SOLUTION: Performed by: NURSE PRACTITIONER

## 2021-02-16 PROCEDURE — 96367 TX/PROPH/DG ADDL SEQ IV INF: CPT

## 2021-02-16 PROCEDURE — 96368 THER/DIAG CONCURRENT INF: CPT

## 2021-02-16 PROCEDURE — 96375 TX/PRO/DX INJ NEW DRUG ADDON: CPT

## 2021-02-16 PROCEDURE — 25010000002 FOSAPREPITANT PER 1 MG: Performed by: NURSE PRACTITIONER

## 2021-02-16 PROCEDURE — 25010000002 IRINOTECAN PER 20 MG: Performed by: NURSE PRACTITIONER

## 2021-02-16 PROCEDURE — 25010000002 BEVACIZUMAB PER 10 MG: Performed by: NURSE PRACTITIONER

## 2021-02-16 PROCEDURE — 25010000002 LEUCOVORIN CALCIUM PER 50 MG: Performed by: NURSE PRACTITIONER

## 2021-02-16 PROCEDURE — 96417 CHEMO IV INFUS EACH ADDL SEQ: CPT

## 2021-02-16 PROCEDURE — 25010000002 BEVACIZUMAB 100 MG/4ML SOLUTION 4 ML VIAL: Performed by: NURSE PRACTITIONER

## 2021-02-16 PROCEDURE — 80053 COMPREHEN METABOLIC PANEL: CPT

## 2021-02-16 PROCEDURE — 25010000002 ATROPINE PER 0.01 MG: Performed by: NURSE PRACTITIONER

## 2021-02-16 PROCEDURE — 25010000002 FLUOROURACIL PER 500 MG: Performed by: NURSE PRACTITIONER

## 2021-02-16 PROCEDURE — 99214 OFFICE O/P EST MOD 30 MIN: CPT | Performed by: NURSE PRACTITIONER

## 2021-02-16 PROCEDURE — 96413 CHEMO IV INFUSION 1 HR: CPT

## 2021-02-16 RX ORDER — SODIUM CHLORIDE 9 MG/ML
250 INJECTION, SOLUTION INTRAVENOUS ONCE
Status: CANCELLED | OUTPATIENT
Start: 2021-02-16

## 2021-02-16 RX ORDER — SODIUM CHLORIDE 9 MG/ML
250 INJECTION, SOLUTION INTRAVENOUS ONCE
Status: COMPLETED | OUTPATIENT
Start: 2021-02-16 | End: 2021-02-16

## 2021-02-16 RX ORDER — PALONOSETRON 0.05 MG/ML
0.25 INJECTION, SOLUTION INTRAVENOUS ONCE
Status: COMPLETED | OUTPATIENT
Start: 2021-02-16 | End: 2021-02-16

## 2021-02-16 RX ORDER — ATROPINE SULFATE 0.4 MG/ML
0.25 AMPUL (ML) INJECTION
Status: DISCONTINUED | OUTPATIENT
Start: 2021-02-16 | End: 2021-02-16 | Stop reason: HOSPADM

## 2021-02-16 RX ORDER — ATROPINE SULFATE 1 MG/ML
0.25 INJECTION, SOLUTION INTRAMUSCULAR; INTRAVENOUS; SUBCUTANEOUS
Status: CANCELLED | OUTPATIENT
Start: 2021-02-16

## 2021-02-16 RX ORDER — PALONOSETRON 0.05 MG/ML
0.25 INJECTION, SOLUTION INTRAVENOUS ONCE
Status: CANCELLED | OUTPATIENT
Start: 2021-02-16

## 2021-02-16 RX ADMIN — DEXAMETHASONE SODIUM PHOSPHATE 12 MG: 10 INJECTION, SOLUTION INTRAMUSCULAR; INTRAVENOUS at 10:42

## 2021-02-16 RX ADMIN — DEXTROSE MONOHYDRATE 365 MG: 50 INJECTION, SOLUTION INTRAVENOUS at 12:50

## 2021-02-16 RX ADMIN — PALONOSETRON 0.25 MG: 0.05 INJECTION, SOLUTION INTRAVENOUS at 10:42

## 2021-02-16 RX ADMIN — SODIUM CHLORIDE 100 ML: 9 INJECTION, SOLUTION INTRAVENOUS at 11:04

## 2021-02-16 RX ADMIN — ATROPINE SULFATE 0.25 MG: 0.4 INJECTION, SOLUTION INTRAMUSCULAR; INTRAVENOUS; SUBCUTANEOUS at 12:46

## 2021-02-16 RX ADMIN — SODIUM CHLORIDE 250 ML: 9 INJECTION, SOLUTION INTRAVENOUS at 10:42

## 2021-02-16 RX ADMIN — LEUCOVORIN CALCIUM 810 MG: 350 INJECTION, POWDER, LYOPHILIZED, FOR SOLUTION INTRAMUSCULAR; INTRAVENOUS at 12:50

## 2021-02-16 RX ADMIN — BEVACIZUMAB 950 MG: 400 INJECTION, SOLUTION INTRAVENOUS at 11:38

## 2021-02-16 RX ADMIN — FLUOROURACIL 4850 MG: 50 INJECTION, SOLUTION INTRAVENOUS at 14:39

## 2021-02-16 NOTE — PROGRESS NOTES
REASON FOR FOLLOW UP:     1. Rectal cancer, rectal ultrasound examination in July 2019 reported T3N0 disease without lymphadenopathy. She does have small pulmonary nodule 6-7 mm and 2 mm with indeterminate feature. There is no solid evidence of metastatic disease otherwise. Patient has stage IIA (T3N0M0) disease.  2. The patient is heterozygous factor V Leiden, was on prophylactic anticoagulation with Lovenox 40 mg daily given her increased risk of thrombosis with MediPort and GI malignancy.   3. PET scan on 8/8/2019 reported an 8 mm hypermetabolic right upper lobe pulmonary nodule, which is suspicious for metastatic as well.    4. Patient had a PowerPort placement on the left upper chest by Dr. Joseph on 8/9/2019.  5. Patient was started on concurrent infusional 5-FU chemoradiation therapy on 8/12/2019, with planned complete surgical resection and further adjuvant chemotherapy with intention to cure the disease.   6. Patient underwent surgical resection of the primary rectal cancer by Dr. Ye on 12/2/2019.  Pathology evaluation reported residual T3N1 disease stage IIIb.  7. Diarrhea related to therapy and radiation.   8. Patient was started cycle 1 day 1 of adjuvant FOLFOX 6 on 1/23/2020.  9. On 2/5/2020 FOLFOX 6 cycle 2 delayed secondary to neutropenia.  Patient was given 3 days of Granix injection.  After cycle #2 we planned 3 days of Granix with each cycle.   10. Patient also intolerant of oral iron.  Patient received 2 doses of IV injectafer on 02/05/2020 and 02/12/2020.   11. 02/12/2020 Proceed with cycle #2 of FOLFOX 6 with 3 days of Granix.  12. On 3/11/2020 cycle 4 postponed secondary to abdominal pain and occasional low-grade fevers.  CT scans ordered.  13. Cycle #4 resumed after CT scan revealed no evidence of disease.  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 Ye. patient has no fever.  Continue to observe.   14. Grade 3  hand-foot syndrome secondary to 5-FU after cycle #7 FOLFOX 6 chemotherapy, prompting ER visit.  Also has worsening peripheral neuropathy.   15. Cycle #8 FOLFOX 6 was given on 5/13/2020, with 50% dose reduction for both 5-FU and oxaliplatin, and also examination of bolus 5-FU.   16. PET scan examination on 5/21/2020 reported no evidence of metastatic disease in the chest abdomen pelvis.  Stable 6 mm RUL pulmonary nodule.  17. On 5/27/2020, I discussed with the patient that we can discontinue chemotherapy at this time.   18. Patient had a surgical procedure for low anterior colon resection, coloanal anastomosis on 8/3/2020.  19. CT scan for chest abdomen pelvis on 9/9/2020 reported a new 8 mm noncalcified pulmonary nodule in the left lower lobe of the lung.  Otherwise stable right upper lobe 6 mm pulmonary nodule, and no evidence of disease recurrence in the abdomen or pelvis.  20. PET scan examination on 9/18/2020 reported multiple hypermetabolic small pulmonary nodules/ new pulmonary nodules.   21. Patient was started on pelvic chemotherapy with FOLFIRI regimen on 10/13/2020.   22. 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 It reported tumor mutation burden 5 Muts/Mb, microsatellite stable, TP53 mutation [id number], and others.   23. Cycle #5 was without irinotecan, due to peripheral neuropathy.  24. 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  Patient was switched to weight-based Lovenox injection.  25. Cycle #6 5-FU and irinotecan was delayed by 2 weeks because of the above incident.    HISTORY OF PRESENT ILLNESS:  The patient is a 40 y.o. female with the above mentioned history here today for lab review and evaluation prior to cycle 9 FOLFIRI and bevacizumab.  She received her first dose of bevacizumab 2 weeks ago with cycle 8.  She did notice an increase in output from her ostomy.  She  had to use Lomotil, maximum of 3/day and was able to get things slow down.  Otherwise, she has been feeling fine.  No bleeding issues.  No fevers or chills.  No increase in shortness of breath or cough.  She does note some bruising on her abdomen from her Lovenox injections, as well as a few knots.      Past Medical History:   Diagnosis Date   • Abdominopelvic abscess (CMS/[facility]) 08/12/2020    ADMITTED TO [facility]   • Abnormal Pap smear of cervix 02/02/1998    JULIUS I   • Anemia in pregnancy    • Anxiety    • Bilateral epiphora 04/2020   • Bilateral hand swelling 05/02/2020    SEEN AT [facility] ER   • Cervical lymphadenitis 06/06/2012    SEEN AT [facility] ER   • Chemotherapy induced neutropenia (CMS/[facility]) 04/2020   • Chemotherapy-induced nausea 02/2020   • Chemotherapy-induced thrombocytopenia 05/2020   • Chronic diarrhea    • Colon polyps     FOLLOWED BY DR. GERONIMO ESPARZA   • Cystitis 04/24/2020    WITH DEHYDRATION, ADMITTED TO [facility]   • Cystitis 10/27/2020   • Drug-induced peripheral neuropathy (CMS/[facility]) 05/2020   • Fistula of intestine    • GERD (gastroesophageal reflux disease)    • Hand foot syndrome 05/2020   • Heart murmur     IN CHILDHOOD   • Hematochezia    • Hemorrhoids    • Heterozygous factor V Leiden mutation (CMS/[facility])     DX 8-2-2019   • History of anemia    • History of chemotherapy 2019    FOLLOWED BY DR. ALEXANDRU HUNT   • History of gestational diabetes    • History of pre-eclampsia    • History of radiation therapy 2019    FOLLOWED BY DR. JAVON LEWIS   • History of TB skin testing 01/17/2009    TB Skin Test   • HPV in female 1998   • Hypokalemia 09/2019   • Hypomagnesemia 09/2019   • IBS (irritable bowel syndrome)    • Ileostomy in place (CMS/[facility])     FOLLOWED BY DR. GERONIMO ESPARZA   • Infected insect bite of neck 05/27/2016    [facility]   • Kidney stones 08/09/2007    SEEN AT [facility] ER   • Lump of right breast     SWOLLEN LYMPH NODE   • Lung cancer (CMS/[facility]) 09/28/2020    METASTATIC LUNG CANCER   • Monopolar  depression (CMS/HCC)    • On anticoagulant therapy    • Perirectal abscess 2020   • PIH (pregnancy induced hypertension)    • Pulmonary embolism without acute cor pulmonale (CMS/HCC) 09/20/2019    X 3; ADMITTED TO [facility]   • Pulmonary nodule, right 2020   • Rectal cancer (CMS/HCC) 2019    STAGE IIA, INVASIVE MODERATELY DIFFERENTIATED ADENOCARCINOMA, CHEMO AND XRT FINISHED 2019   • Right shoulder pain 2020    SEEN AT [facility] ER   • Right ureteral stone 10/01/2019    SEEN AT [facility] ER   • SAB (spontaneous ) 1996     A2-1 INDUCED   • Sciatica    • Sepsis due to cellulitis (CMS/HCC) 2002    LEFT GREAT TOE, ADMITTED TO [facility]   • Tachycardia 2020   • Urinary urgency 2020     Past Surgical History:   Procedure Laterality Date   • CHOLECYSTECTOMY N/A 10/10/2003    LAPAROSCOPIC WITH CHOLANGIOGRAM, DR. JAMEY TALAVERA AT [facility]   • COLON RESECTION N/A 2019    Procedure: laparoscopic low anterior colon resection with mobilization of splenic flexure and diverting loop ileostomy: ERAS;  Surgeon: Padmaja Esparza MD;  Location: [facility];  Service: General   • COLON RESECTION N/A 8/3/2020    Procedure: LOW ANTERIOR COLON RESECTION, COLOANAL ANASTOMOSIS, MOBILIZATION SPLENIC FLEXURE;  Surgeon: Padmaja Esparza MD;  Location: [facility];  Service: General;  Laterality: N/A;   • COLONOSCOPY N/A 7/15/2020    PATENT ANASTAMOSIS IN MID RECTUM, RESCOPE IN 1 YR, DR. PADMAJA ESPARZA AT [facility]   • COLONOSCOPY W/ POLYPECTOMY N/A 2019    15 MM TUBULOVILLOUS ADENOMA POLYP IN HEPATIC FLEXURE, 20 MMTUBULOVILLOUS ADENOMA WITH HIGH GRADE DYSPLASIA IN RECTOSIGMOID, 4 CM MASS IN MID RECTUM, PATH: INVASIVE MODERATELY DIFFERENTIATED ADENOCARCINOMA, DR. JENNIFER LI AT [facility]   • DILATATION AND EVACUATION N/A    • ENDOSCOPY N/A 2019    LA GRADE A ESOPHAGITIS, GASTRITIS, ALL BIOPSIES BENIGN, DR. JENNIFER LI AT [facility]   • INCISION AND DRAINAGE PERIRECTAL  ABSCESS N/A 8/14/2020    Procedure: INCISION AND DRAINAGE OF retrorectal dehiscence abcess with drain placement and irrigation;  Surgeon: Geronimo Ye MD;  Location: [facility] MAIN OR;  Service: General;  Laterality: N/A;   • PAP SMEAR N/A 01/24/2014   • SIGMOIDOSCOPY N/A 7/24/2019    INFILTRATIVE PARTIALLY OBSTRUCTING LARGE RECTAL CANCER, AREA TATOOED, DR. GERONIMO YE AT [facility]   • SIGMOIDOSCOPY N/A 11/23/2019    AN ULCERATED PARTIALLY OBSTRUCTING MASS IN MID RECTUM, AREA TATTOOED, DR. GERONIMO YE AT [facility]   • TRANSRECTAL ULTRASOUND N/A 7/24/2019    Procedure: ULTRASOUND TRANSRECTAL;  Surgeon: Geronimo Ye MD;  Location: [facility] ENDOSCOPY;  Service: General   • URETEROSCOPY LASER LITHOTRIPSY WITH STENT INSERTION Right 10/30/2019    DR. ESTUARDO BEASLEY AT [place]   • VAGINAL DELIVERY N/A 09/18/1998   • VENOUS ACCESS DEVICE (PORT) INSERTION Left 8/9/2019    Procedure: INSERTION VENOUS ACCESS DEVICE;  Surgeon: Sj Joseph MD;  Location: [facility] OR OSC;  Service: General   • VENOUS ACCESS DEVICE (PORT) INSERTION N/A 12/18/2020    Procedure: INSERTION VENOUS ACCESS DEVICE right side, removal venous access device left side;  Surgeon: Sj Joseph MD;  Location: [facility] OR [facility];  Service: General;  Laterality: N/A;   • WISDOM TOOTH EXTRACTION Bilateral 1993       HEMATOLOGIC/ONCOLOGIC HISTORY:      The patient reports she has intermittent rectal bleeding and urgency, mucous with her stool, starting sometime in 2016. At that time she was referred to GI service, and was diagnosed of irritable bowel syndrome. Nevertheless she had worsening urgency for bowel movement, and had a couple of incidents losing control of stool. She was recently seen by Roland Thorpe MD again and had colonoscopy and EGD exam on 07/08/2019. She was found having a circumferential rectal mass. According to the procedure note, the patient had a fungating circumferential bleeding 4 cm mass in the middle rectum, at distance between 13 cm and 17 cm  from the anus. Mass was causing partial obstruction. There were also colon polyps found at the hepatic flexure and also at the rectosigmoid junction 23 cm from the anus. Both were resected and retrieved. EGD examination reported grade A esophagitis at the GE junction and patchy discontinuous erythema and aggravation of the mucosa without bleeding in the stomach body. There is normal mucosa of the duodenum. Pathology evaluation from this procedure reported moderately differentiated adenocarcinoma involving the rectal mass. The rectal sigmoid junction polyp was tubular/tubulovillous adenoma with high grade dysplasia negative for invasive malignancy. The hepatic flexure polyp was also tubular/tubulovillous adenoma negative for high grade dysplasia or malignancy. The biopsy from the esophagus reports squamocolumnar mucosa with inflammatory changes suggestive of mild reflux esophagitis, negative for interstitial metaplasia. Gastric biopsy was benign and duodenal biopsy was also benign. There is no mention of H-pylori.     Patient was subsequently referred to colorectal surgeon Padmaja Ye MD for further evaluation. The patient had CT scan examination for chest, abdomen and pelvis requested by Dr. Ye and were done on 07/13/2019. The study reported no evidence of lymphadenopathy in the abdomen and pelvis. There was wall thickening of the rectosigmoid junction. Normal spleen, pancreas, adrenal glands and kidneys. There was fatty liver infiltration but no focal lymphatic lesions. There was a small 6-7 mm noncalcified nodule in the right upper lobe and a tiny 3 mm subpleural nodule in the right middle lobe. No mediastinal or hilar lymphadenopathy.     Dr. Ye performed staging rectal ultrasound examination. This study reported tumor penetrating through the muscularis propria as T3 disease; there were no lymph nodes identified.    She had a hospitalization in late September 2019 because of newly discovered pulmonary  emboli.  She was on prophylactic Lovenox prior to the incident of PE.  Now she is on full dose Xarelto anticoagulation.  Patient reports no further chest pain dyspnea.  She denies bleeding or bruising.  During her hospitalization, she was seen by Dr. Ye, who plans to have surgery 8 to 12 weeks after finishing radiation therapy.  She finished her radiation on 9/19/2019.     I noticed patient also presented to the emergency room on 10/1/2019 complaining of right flank area pain.  I reviewed the images studies and indeed she has a very small 1 to 2 mm obstructing kidney stone in the UV junction.  Patient is still symptomatic with some pains and dysuria.  She denies fever sweating or chills.    Laboratory study on 10/7/2019 reported normal CBC including hemoglobin 13.1, platelets 301,000, WBC 6170 and ANC 4900 lymphocytes 590 monocytes 480.      The nurse reported malfunction of port-a-catheter on 10/7/2019.  Port study on 10/8/2019 showed fibrin sheath around the distal aspect of the Mediport catheter in the SVC. This does not appear to hinder injection, but does prevent aspiration at this time.    Patient underwent surgical resection of the colon on 12/2/2019 with Dr. Ye.  Pathology evaluation reported residual T3 disease, also 1 out of 14 lymph nodes positive for malignancy.  So this patient in either had at least stage IIIb disease (T3 N1 M0?).      Adjuvant chemotherapy FOLFOX 6 will be started on 1/22/2020.  Laboratory study reported iron saturation 10%, free iron 31 TIBC 319 and ferritin 168.  Her hemoglobin was 11.8, WBC 5600, and platelets 347,000.    Patient was here on 02/12/2020 for cycle 2 of her FOLFOX.  This is delayed x1 week secondary to neutropenia.  The patient ultimately received 3 days worth of Neupogen with recovery of her blood counts.  Of note, the patient struggled with significant nausea without any episodes of vomiting following cycle #1 of chemotherapy resulting in significant weight  loss.  She is up 12 pounds over the last week as her appetite has normalized.  We will add Emend to her care plan.    She is having several loose stools per her colostomy and has been hesitant to take Imodium due to prior history of constipation.  I encouraged her to try this with a maximum of 8 tablets/day.  She denies any infectious symptoms including fevers or chills.  No excess bleeding or bruising noted.  She had the expected cold sensitivity related to the Oxaliplatin for about 3 days following treatment.    Labs from 02/12/2020 demonstrated total white blood cell count of 5.14, ANC of 3.06, hemoglobin of 11.2, platelets of 211,000.  She was found to be iron deficient last week and is intolerant to oral iron secondary to GI distress.  For this reason, she initiated IV iron therapy with Injectafer last week.  She had received her second dose 02/12/2020    Patient has normalized hemoglobin 12.2 on 2/26/2020.    She reported on 5/5/2020 she had a recent visit to the emergency department for what was suspected to be an allergic reaction.  She called our on-call service on Saturday with reports of hand and face swelling.  She was instructed to proceed to the emergency department at which time she was assessed and prescribed a Medrol Dosepak.  She had just completed her 5-FU and was unhooked on Friday, 5/3/2020.  Her symptoms have improved.  She does report persistent hyperpigmentation and mild swelling of the palms of the hands but this is much improved.  It was her right hand specifically that was swollen.  Her facial swelling has resolved.  She continues on Cefdinir nd since has 1 day remaining, she was prescribed cefdinir for a UTI requiring hospitalization at the end of April.  Reports no new symptoms.  Her labs are stable.  She is scheduled for treatment again.    Patient states at this time she is not tolerating her chemotherapy well.     Patient seen in the emergency department on 5/2/2020 for what was  suspected to be an allergic reaction.  She called our on-call service on Saturday explaining that she was experiencing hand and face swelling.  She was instructed to proceed to the emergency department at which time she was assessed and prescribed a Medrol Dosepak.  She had just completed her 5-FU and was unhooked on Friday, 5/1/2020.      She reports since her ED visit on 5/2/2020 her symptoms have not improved. Her hands and feet were swollen upon presenting to the ED and she could barely make a fist. She states that she feels so swollen she is not able to stand it. She states that her skin on the hands and feet are peeling extensively as well, besides changing color to more dark.     She also reports significant fatigue after her first week of the 5-FU treatment but she expected this side effect. She also notices significant increase in her neuropathy to the point that she is not able to even walk around in her home without her house slippers due to irritation from her rugs. She denies and associated nausea or vomiting at this time. She also has episodes of epistaxis every day after the chemotherapy cycle #7.  She does report working full-time during the week of chemotherapy.    Laboratory studies, 5/13/2020, show moderate thrombocytopenia platelets 123,000, low normal WBC 4140 including ANC 2720 and normal hemoglobin 13.4.  Because significant hand-foot syndrome, will decrease both 5-FU and oxaliplatin by 50%, and eliminate bolus dose of 5-FU.    On 5/21/2020 patient had a PET scan performed which showed no convincing evidence of residual disease in abdomen or pelvis, no metastatic disease within the chest or neck.     Cycle #8 FOLFOX 6 was given on 5/13/2020, with 50% dose reduction for both 5-FU and oxaliplatin, and also examination of bolus 5-FU.  She states on 5/27/2020 that with the recent reduction of the chemotherapy she feels significantly better. She has more energy and is able to do her daily routine.       PET scan examination on 5/21/2020 reported no evidence of metastatic disease in chest abdomen pelvis.  There was a stable 6 mm right upper lobe pulmonary nodule.    Laboratory studies on 5/27/2020 showed mild leukocytopenia WBC 3720 but a normal ANC 2250 and lymphocytes 630.  Normal platelets 163,000 and hemoglobin 12.6.  Chemistry lab reported normal renal function, liver function panel and a electrolytes, elevated glucose 164.    Laboratory studies 6/24/2020, showed normal hemoglobin 13.4 but macrocytosis .9.  Normal platelets 210,000 and WBC 5870.  She had normal CMP.     Patient last time was here in late June 2020.  Since that time she had surgical procedure for low anterior colon resection, coloanal anastomosis on 8/3/2020.  She later developed a perirectal abscess, required surgical drainage on 8/14/2020.  She was discharged on 8/27/2020.    Patient reports to me she still has lower abdominal wall vacuum suction in place.  She denies fever sweating chills.  Performance status is ECOG 1.  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  She does have visiting nurse come to the home for wound care.    CT scan for chest abdomen pelvis on 9/9/2020 reported a new 8 mm noncalcified pulmonary nodule in the left lower lobe.  Otherwise stable right upper lobe 6 mm pulmonary nodule, and no evidence of disease recurrence in the abdomen or pelvis.     Laboratory study on 9/16/2020 reported elevated AST 55, ALT 95, alk phosphatase 143 but normal total bilirubin 0.3.  Chemistry lab otherwise unremarkable.  She has completed normal CBC.      Because of the abnormal CT scan examination for chest abdomen pelvis on 9/9/2020 reported a new 8 mm noncalcified pulmonary nodule in the left lower lobe, we obtained a PET scan examination for further evaluation, which was done on 9/18/2020.  This study reported several pulmonary nodules, some of them are hypermetabolic, newly developed compared to  previous PET scan in May 2020.  Certainly does highly suspicious for metastatic disease.  There are also hypermetabolic activity in the abdomen and pelvic area which is hard to differentiate from surgical scar versus metastatic malignancy.    Laboratory study on 10/13/2020 reported normal CBC with Hb 13.9, platelets 302,000 and WBC 5520 including ANC 3830.  Chemistry lab reported normalized AST 20, alk phosphatase 116 improved ALT 35, and maintains normal bilirubin, with normal electrolytes and liver function panel.     Patient was started on first cycle of palliative chemotherapy FOLFIRI on 10/13/2020.    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  Patient had a new port placed 12/18/2020, and had held her Xarelto for 2 days prior to surgery.  She presented to the ER on 12/21/20 with complaints of facial and arm swelling for 3 days.  She also noted increased shortness of breath.  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  Thrombus within the right internal jugular vein cannot be excluded.  Patient was started on IV heparin, and eventually transitioned to weight-based Lovenox, which she now continues.      MEDICATIONS    Current Outpatient Medications:   •  clonazePAM (KlonoPIN) 1 MG tablet, Take 1 tablet by mouth 3 (Three) Times a Day As Needed for Anxiety or Seizures., Disp: 90 tablet, Rfl: 0  •  Diclofenac Sodium (VOLTAREN) 1 % gel gel, Apply 4 g topically to the appropriate area as directed 3 (Three) Times a Day., Disp: 150 g, Rfl: 3  •  dicyclomine (BENTYL) 20 MG tablet, TAKE 1 TABLET BY MOUTH EVERY 6 HOURS AS NEEDED, Disp: 360 tablet, Rfl: 0  •  diphenoxylate-atropine (LOMOTIL) 2.5-0.025 MG per tablet, TAKE 1 TABLET BY MOUTH 4 (FOUR) TIMES A DAY AS NEEDED FOR DIARRHEA., Disp: 120 tablet, Rfl: 1  •  enoxaparin (Lovenox)  100 MG/ML solution syringe, Inject 1 mL under the skin into the appropriate area as directed Every 12 (Twelve) Hours., Disp: 60 mL, Rfl: 2  •  escitalopram (Lexapro) 10 MG tablet, Take 1 tablet by mouth Daily., Disp: 30 tablet, Rfl: 1  •  famotidine (PEPCID) 20 MG tablet, TAKE 1 TABLET BY MOUTH TWICE A DAY (Patient taking differently: Take 20 mg by mouth 2 (Two) Times a Day.), Disp: 180 tablet, Rfl: 1  •  HYDROcodone-acetaminophen (NORCO) 7.5-325 MG per tablet, Take 1 tablet by mouth Every 6 (Six) Hours As Needed for Moderate Pain ., Disp: 240 tablet, Rfl: 0  •  Loratadine (CLARITIN) 10 MG capsule, Take 10 mg by mouth Every Evening., Disp: , Rfl:   •  ondansetron (ZOFRAN) 8 MG tablet, Take 1 tablet by mouth 3 (Three) Times a Day As Needed for Nausea or Vomiting., Disp: 60 tablet, Rfl: 5  •  prochlorperazine (COMPAZINE) 10 MG tablet, Take 1 tablet by mouth Every 6 (Six) Hours As Needed for Nausea or Vomiting., Disp: 30 tablet, Rfl: 2  No current facility-administered medications for this visit.     Facility-Administered Medications Ordered in Other Visits:   •  atropine injection 0.25 mg, 0.25 mg, Intravenous, Q15 Min PRN, Sheba Matias APRN, 0.25 mg at 02/16/21 1246  •  fluorouracil (ADRUCIL) 4,850 mg, sodium chloride 0.9 % 143 mL 240 mL chemo infusion - FOR HOME USE, 2,400 mg/m2 (Treatment Plan Recorded), Intravenous, Once, Sheba Matias APRN, 4,850 mg at 02/16/21 1439    ALLERGIES:   No Known Allergies    SOCIAL HISTORY:       Social History     Tobacco Use   • Smoking status: Former Smoker     Packs/day: 1.00     Years: 24.00     Pack years: 24.00     Types: Electronic Cigarette, Cigarettes   • Smokeless tobacco: Never Used   • Tobacco comment: LAST CIGARETTE 8/12/2020   Substance Use Topics   • Alcohol use: Not Currently   • Drug use: No         FAMILY HISTORY:   Mother has positive factor V Leiden mutation, although she did not have thrombosis, mother also is coronary disease with stenting, she  also is occasional bruising.  Maternal grandmother had DVT, she had multiple surgical procedures.  Patient mother's half-brother had metastatic colon cancer at diagnosis in his 50s.  Maternal great aunt has breast cancer.  Patient will follow his skin cancer in his 60s, exclusive.    REVIEW OF SYSTEMS:  Review of Systems   Constitutional: Negative for activity change, appetite change, chills, diaphoresis, fatigue, fever and unexpected weight change.   HENT: Negative for congestion, hearing loss, mouth sores, nosebleeds and trouble swallowing.    Eyes: Negative for photophobia and visual disturbance.   Respiratory: Negative for cough, chest tightness and shortness of breath.    Cardiovascular: Negative for chest pain, palpitations and leg swelling.   Gastrointestinal: Positive for diarrhea. Negative for abdominal distention, abdominal pain, anal bleeding, constipation and nausea.   Endocrine: Negative for cold intolerance and heat intolerance.   Genitourinary: Negative for dysuria and hematuria.   Musculoskeletal: Negative for arthralgias, back pain, joint swelling and neck pain.   Skin: Negative for color change, pallor and wound.   Allergic/Immunologic: Positive for immunocompromised state (Secondary to adjuvant chemotherapy). Negative for environmental allergies and food allergies.   Neurological: Positive for numbness (Mild numbness involving fingertips and toes). Negative for dizziness, weakness and headaches.   Hematological: Negative for adenopathy. Bruises/bleeds easily (At the site of Lovenox injection only the left abdominal wall).   Psychiatric/Behavioral: Negative for agitation, confusion, dysphoric mood (Depression under control with medication) and suicidal ideas. The patient is nervous/anxious.    02/16/2021 ROS unchanged from above except as updated.             Vitals:    02/16/21 0959   BP: 139/90   Pulse: 100   Resp: 19   Temp: 97.7 °F (36.5 °C)   TempSrc: Temporal   SpO2: 95%   Weight: 97.8 kg  "(215 lb 11.2 oz)   Height: 166 cm (65.35\")   PainSc: 0-No pain     Current Status 2/16/2021   ECOG score 0         Physical Exam  Vitals signs reviewed.   Constitutional:       General: She is not in acute distress.     Appearance: Normal appearance. She is well-developed.   HENT:      Head: Normocephalic and atraumatic.      Mouth/Throat:      Pharynx: No oropharyngeal exudate.   Eyes:      Pupils: Pupils are equal, round, and reactive to light.   Neck:      Musculoskeletal: Normal range of motion.   Cardiovascular:      Rate and Rhythm: Normal rate and regular rhythm.      Heart sounds: Normal heart sounds. No murmur.   Pulmonary:      Effort: Pulmonary effort is normal. No respiratory distress.      Breath sounds: Normal breath sounds. No wheezing, rhonchi or rales.   Abdominal:      General: Bowel sounds are normal. There is no distension.      Palpations: Abdomen is soft.      Comments: Ostomy in place with liquid brown stool.  Scattered bruises on the abdomen bilaterally from Lovenox injections.   Musculoskeletal: Normal range of motion.   Skin:     General: Skin is warm and dry.      Findings: No rash.   Neurological:      Mental Status: She is alert and oriented to person, place, and time.       RECENT LABS:    Lab Results   Component Value Date    WBC 4.60 02/16/2021    HGB 14.7 02/16/2021    HCT 44.7 02/16/2021    MCV 99.6 (H) 02/16/2021     02/16/2021     Lab Results   Component Value Date    NEUTROABS 2.79 02/16/2021     Glucose   Date Value Ref Range Status   02/16/2021 134 (H) 74 - 124 mg/dL Final     BUN   Date Value Ref Range Status   02/16/2021 6 6 - 20 mg/dL Final     Creatinine   Date Value Ref Range Status   02/16/2021 0.85 0.60 - 1.10 mg/dL Final   09/09/2020 0.60 0.60 - 1.30 mg/dL Final     Comment:     Serial Number: [id number]:  [id number]     Sodium   Date Value Ref Range Status   02/16/2021 140 134 - 145 mmol/L Final     Potassium   Date Value Ref Range Status   02/16/2021 4.3 3.5 - " 4.7 mmol/L Final     Chloride   Date Value Ref Range Status   02/16/2021 104 98 - 107 mmol/L Final     CO2   Date Value Ref Range Status   02/16/2021 24.7 22.0 - 29.0 mmol/L Final     Calcium   Date Value Ref Range Status   02/16/2021 9.6 8.5 - 10.2 mg/dL Final     Total Protein   Date Value Ref Range Status   02/16/2021 7.2 6.3 - 8.0 g/dL Final     Albumin   Date Value Ref Range Status   02/16/2021 3.90 3.50 - 5.20 g/dL Final     ALT (SGPT)   Date Value Ref Range Status   02/16/2021 57 (H) 0 - 33 U/L Final     AST (SGOT)   Date Value Ref Range Status   02/16/2021 28 0 - 32 U/L Final     Alkaline Phosphatase   Date Value Ref Range Status   02/16/2021 108 38 - 116 U/L Final     Total Bilirubin   Date Value Ref Range Status   02/16/2021 0.2 0.2 - 1.2 mg/dL Final     eGFR Non  Amer   Date Value Ref Range Status   02/16/2021 74 >60 mL/min/1.73 Final     BUN/Creatinine Ratio   Date Value Ref Range Status   02/16/2021 7.1 (L) 7.3 - 30.0 Final     Anion Gap   Date Value Ref Range Status   02/16/2021 11.3 5.0 - 15.0 mmol/L Final     Lab Results   Component Value Date    CEA 1.32 09/16/2020     IMAGING:     F-18 FDG PET FROM SKULL BASE TO MID THIGH WITH PET/CT FUSION 1/12/2021     HISTORY: Rectal cancer, status post chemotherapy; restaging     TECHNIQUE: Radiation dose reduction techniques were utilized, including  automated exposure control and exposure modulation based on body size.   Blood glucose level at time of injection was 110 mg/dL.  6.1 mCi of F-18  FDG were injected and PET was performed from skull base to mid thigh. CT  was obtained for localization and attenuation correction. Time at  injection 0718. PET start time 0834.      Compared with PET CT 09/18/2020 and multiple CT chest, abdomen and  pelvis dating back to 03/13/2020     FINDINGS:      There is no cervical adenopathy demonstrating FDG uptake significantly  above that of blood pool. The thyroid, submandibular and parotid glands  have a roughly  symmetric FDG uptake. Loculated hypodense collection  within the left chest wall in the area of the previously seen port  measures 1.9 cm and demonstrates mild-to-moderate FDG uptake with a max  SUV of 4.9. Of note, the patient had a new port placed on 12/18/2020 and  removal of the left port..     Hypermetabolic right hilar adenopathy is present and measures  approximately 1.1 cm in short axis dimension, grossly unchanged since  12/21/2020. It demonstrates a max SUV of 5.     There is no mediastinal or axillary adenopathy demonstrating FDG uptake  significantly above that of blood pool.     Heart is normal in size.     Bilateral pulmonary nodules are present, as before, with index nodules  as below:  *  The right upper lobe pulmonary nodule measures 0.6 cm, grossly  unchanged since 09/18/2020. However, it has significantly decreased in  degree of FDG uptake with a max SUV of 1.3 (previously 4).  *  Sub-6 mm pulmonary nodule within the left upper lobe is grossly  unchanged over multiple prior CTs.  *  The previously seen FDG avid 0.8 cm pulmonary nodule within the left  lower lobe is no longer visualized.     No new suspicious FDG avid pulmonary nodules are visualized. The degree  of pulmonary consolidation within the right base has improved since  12/23/2020 with resolution of the small right pleural effusion.  Persistent patchy areas of groundglass opacification within the  bilateral lungs have essentially resolved.     No focal FDG avid abnormality is visualized within the liver, pancreas,  spleen, adrenal glands or kidneys. Marked hepatic steatosis present.  Gallbladder is surgically absent. There is no hydronephrosis.     There is no abdominopelvic adenopathy demonstrating FDG uptake  significantly above that of blood pool. Postsurgical changes from  diverting right lower quadrant ileostomy are present. The degree of FDG  uptake within the midline surgical scar and right lower quadrant  ileostomy has decreased.  Additionally, the degree of FDG uptake within  the relatively symmetric presacral soft tissue thickening has also  decreased with a max SUV of 5.6 (previously 12.4). The previously seen  air within the presacral soft tissues is no longer visualized. While a  discrete measurable fluid collection is not visualized, it cannot be  excluded in the absence of intravenous contrast. Presacral soft tissue  thickening measures up to 2.4 cm, as before. No free intraperitoneal air  is present.     There are no suspicious FDG avid lytic or blastic osseous lesions.     IMPRESSION:  1.  Interval decrease in FDG uptake within the right upper lobe  pulmonary nodule. Previously seen FDG avid left lower lobe pulmonary  nodule has resolved.  2.  Mild to moderately FDG avid right hilar node which is grossly  unchanged since 12/21/2020. The previously seen bilateral groundglass  opacification and right pleural effusion have resolved with improvement  in the degree of right basilar pulmonary consolidation since 12/23/2020.  While the adenopathy may be reactive, continued close attention on  follow-up is recommended to exclude metastatic disease.  3.  Resolution of the air within the presacral soft tissues with overall  decrease in the degree of FDG uptake within the operative bed of the  pelvis and subcutaneous tissues as detailed above.  4.  Postsurgical changes from recent left Port-A-Cath removal are  present with probable seroma in the operative bed, the sterility of  which cannot be determined based on imaging and correlation with patient  history and physical exam is recommended to exclude abscess.  5.  Other findings as above.        Assessment/Plan      ASSESSMENT:   1.  Rectal cancer, rectal ultrasound examination reported T3N0 disease without lymphadenopathy.   · CT scan of chest, abdomen and pelvis reported no lymphadenopathy in the abdomen, pelvis or chest. She does have small pulmonary nodule 6-7 mm and 2 mm with  indeterminate feature. There is no solid evidence of metastatic disease otherwise.   · Based on the CT scan and rectal ultrasound imaging studies, this patient had stage IIA (T3N0M0) disease.    · She had PET scan examination on 8/8/2019 which reported a hypermetabolic right upper lobe pulmonary nodule 6 mm with SUV 5.6.  This is suspicious for metastatic disease however it is too small to be biopsied.  This patient may have stage IV disease.   · She initiated concurrent radiation with continuous 5-FU on 8/12/2019.  · Patient finished concurrent chemoradiation therapy.  · Patient underwent surgical resection of the rectal tumor and diverting loop ileostomy on 12/2/2019 with Dr. Ye.  Pathology evaluation reported residual T3 disease, with 1 out of 14 lymph nodes positive for malignancy.  Certainly this patient has at least stage IIIb rectal cancer (T3 N1 M0?)  · On 1/22/2020 adjuvant chemotherapy FOLFOX 6 regimen initiated.    · On 2/5/2022 cycle #2 was delayed secondary to neutropenia.  She was given 3 days of Granix.   · Emend added with cycle 3.  With improved nausea control  · Continuing home Granix x3 days following 5-FU disconnect  · 3/11/2020 due for cycle 4, however, she is experiencing progressive abdominal pain and occasional fevers.   · CT scan performed on 3/13/2020 reveals no evidence of progressive or recurrent disease.  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  · Patient hospitalized 4/24-4/26/20 after cycle 5 of chemotherapy with acute UTI.  CT abdomen/pelvis noting fluid collection in the presacral region having diminished in size compared to CT dated 3/13/2020.  Patient was evaluated by Dr. Ye while in the hospital with further plans to evaluate possible colovaginal fistula following completion of chemotherapy.  Patient did respond to IV antibiotics and discharged home on oral cefdinir.  · 4/29/2020 cycle 6 of FOLFOX.  Urinary symptoms have resolved   · Patient seen in the [facility] ED on  5/2/2020 for what was suspected to be an allergic reaction.  She called our service on Saturday explaining that she was experiencing hand and face swelling.  She was instructed to proceed to the emergency department at which time she was assessed and prescribed a Medrol Dosepak.  She had just completed her 5-FU and was unhooked on Friday, 5/1/2020.  Her symptoms have resolved in the office on assessment, 5/5/2020.  · The patient had grade 3 hand-foot syndrome based on symptomology.  Patient had cycle #8 of 5-FU on 5/13/2020. Due to her symptoms and poor tolerance to the 5-FU, her treatment dose will be reduced 50% for oxaliplatin and infusional 5-FU.  Bolus 5-FU will be discontinued..  · On 5/13/2020  We discussed further treatment options pending the scan results.  If she has indeed residual disease or metastatic disease, we will switch her to irinotecan plus Avastin or anti-EGFR monoclonal antibody.  She will need a further molecular testing of her tumor samples in that case.  · On 5/21/2020 patient had a PET scan and it showed no evidence of of metastatic disease in the neck, chest, abdomen or pelvis.  There was fluid accumulation/abscess.   · On 5/27/2020 I discussed with the patient that we can discontinue chemotherapy at this time.  She will follow-up with Dr. Ye to discuss a possibility to reverse the ileostomy.  We likely will obtain anther PET scan in 3 to 4 months for reassessment.    · Patient was seen by Dr. Ye on 6/19/2019 for evaluation and to discuss possible take down of her ileostomy.  She is scheduled to have a gastrografin enema on 7/2/2020 to evaluate for a fistula, and then a colonoscopy on 7/15/2020, both done by Dr. Ye.  She states that based on the above imaging and procedure findings, she may have a more extensive surgery done or just have her ileostomy reversed, which would likely be done in August 2020.  This will all be coordinated under the care of Dr. Ye.     · I reviewed  scan results with the patient on 9/16/2020 for the most recent CT scan from 09/09/2020 and also compared it to her previous PET scan examination from 05/21/2020 and also the original PET scan from 08/09/2019.  The original PET scan there is a small right upper lobe pulmonary nodule 6 mm with SUV 5.6. So in the 05/2020 PET scan the nodule is still there but activity seems much less and this most recent CT scan examination also documented the preexisting small pulmonary nodule however there is a new left lower lobe pulmonary nodule 8 mm in size and I shared with the patient today this nodule is suspicious for metastatic disease. Laboratory studies reported minimal CEA level 1.32 on 09/09/2020. Liver function panel was only marginally abnormal with the ALT 45, the remaining is normal. However laboratory study today showed worsening AST 55, ALT 95 and alkaline phosphatase 143 but is still normal, total bilirubin 0.3.   · So I discussed with the patient on 9/16/2020 recommended to have repeat PET scan examination for assessment because the left lower lobe pulmonary nodule is too small to be biopsied, and if the PET scan reports hypermetabolic lesion, I recommended to have stereotactic radiation therapy. Explained to patient that she is not a really good candidate to have thoracotomy for resection because she still has unhealed wound in the abdomen. I think the stereotactic radiation therapy will be a more feasible choice.  · Laboratory study on 9/16/2020 reported worsening liver function panel with both elevated AST, ALT and also slightly worsened alkaline phosphatase despite having normal total bilirubin. I recommended to repeat laboratory study for reevaluation. The only new medication she has in the past several days is Augmentin but otherwise no change of condition. She denies any recent viral infection. We will monitor this.   · PET scan examination obtained on 9/18/2020 showed metastatic disease.  It reported a few  hypermetabolic pulmonary nodules, and some new small pulmonary nodules, all of them highly suspicious for metastatic disease.  She also has hypermetabolic activity in the abdomen and pelvic area which could be related to scar tissue from her recent surgery versus metastatic disease.  Nevertheless overall picture fits with metastatic cancer.  · I discussed with the patient on 9/20/2020, we reviewed the PET scan images together, and I recommended to have systematic chemotherapy, I do not think stereotactic reading therapy to the hypermetabolic lesions in the lungs warranted at this time because even those will be treated with reading therapy, she will still need systematic chemotherapy.  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  I did discuss with the patient using anti-EGFR monoclonal antibody versus anti-VEGF monoclonal antibody.     · Palliative chemotherapy cycle#1 FOLFIRI was started on 10/13/2020.  No bolus 5-FU given considering previous poor tolerance.  Genetic studies still pending.   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  Microsatellite stable, mutation burden 5/Mb.    · Discussed with the patient 10/27/2020, because of the K-s[place]ia mutation, she is not a candidate for anti-EGFR monoclonal antibody such as Erbitux or vectibix.  She could be a candidate for anti-VEGF monoclonal antibody, however because of active wound, she is not a candidate right now at this moment.  · Patient seen in the ED for acute right neck pain on 11/16/2020.  CT angiogram as well as CT of the cervical spine performed with no acute findings but notably one of her pulmonary nodules, left upper lobe, somewhat decreased in size.  Patient given prednisone pack.  Further details below.  · Patient due today for cycle #4 FOLFIRI.  Plan repeat PET scan after cycle 6.  · Last received cycle #5 chemotherapy without irinotecan on 12/8/2020.   · Patient here 12/29/2020 for  evaluation and consideration of cycle 6, but she continues to complain of swelling.  She does have swelling typically after treatment as well.  We will hold treatment for 1 week and reevaluate next week.  Still planning on PET scan following cycle 6.   · Cycle #6 chemotherapy will be resumed on 1/5/2021.  Started back on original irinotecan.  · PET scan examination obtained on 1/12/2021 after cycle #6 chemotherapy reported improved a pulmonary nodule hypermetabolic activity.  Confirmed these are metastatic lesions.  · Patient is evaluated 1/19/2020, will continue cycle #7 FOLFIRI chemotherapy.  However Avastin will be on hold see next section.   · Patient was reevaluated on 2/2/2021, will continue chemotherapy cycle #8 FOLFIRI.  Avastin / biosimilar will be added.  Patient decides to finish up chemotherapy.  She is still waiting to talk to [facility] [place] specialist before making final decision for resection of metastatic lung lesion.   · 2/16/2021cycle 8 FOLFIRI plus  bevacizumab.  Tolerated last cycle well with only some mild increase in liquid stools.  This was easily controlled with antidiarrheals.    2 .  Pulmonary nodule, hypermetabolic on PET scan.   · Her original PET scan examination from 8/8/2019 reported a small 8 mm right upper apex pulmonary nodule but hypermetabolic SUV 5.1.  That was too small to be biopsied, however there was always a possibility of metastatic disease considering that high activity despite a such a small nodule.  · Her chest CT scan examination from 3/13/2020 reported this shrank to 6 mm.    · PET scan examination on 5/21/2020 reported no metabolic activity at this residual nodule.  This needs to be monitored.    · PET scan examination 9/18/2020 reported couple of hypermetabolic pulmonary nodules, besides a few extra small pulmonary nodules.  Those are highly suspicious for metastatic disease.    · PET scan examination obtained on 1/12/2021 after cycle #6  chemotherapy reported improved a pulmonary nodule hypermetabolic activity.  Confirmed these are metastatic lesions.  · 1/19/2021, patient reports she will see thoracic surgeon tomorrow at [facility] where she seeks second opinion evaluation, and to see whether she would be a candidate for resection of pulmonary nodules.  I discussed with the patient, she has at least 2 hypermetabolic lesions although they are responded to chemotherapy, I do not think she would be a candidate for surgery.   · Thoracic surgeon from [facility] recommended metastectomy and to discontinue chemotherapy afterwards.   · Patient is waiting to discuss with [facility] specialist scheduled on 2/11/2021.    3.   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  Low-dose Lovenox discontinued and initiated Xarelto 20 mg daily.  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  · Patient held Xarelto for 2 days prior to MediPort removal from the left chest wall and placement of new port in the right chest wall on 12/18/2020.  She resumed Xarelto that evening.  · Progressive swelling in the neck, face, upper extremities prompting hospitalization with CT scan showing thrombosis in the right subclavian vein and SVC.  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  Although she had been off of Xarelto for a few days,  clearly Xarelto was not prohibiting progression of her thrombosis after she resumed treatment and there was some evidence to suggest thrombosis had been present at least in the innominate vein on prior scan in September but now appears chronic.  Current acute thrombosis involving SVC and right subclavian.  · Patient was admitted and placed on heparin drip  · Patient was evaluated by vascular surgery and intervention was not recommended for thrombolysis/thrombectomy.  · On 12/22/2020, the patient developed worsening shortness of breath, increasing upper extremity edema consistent with worsening SVC syndrome.  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  · Symptoms improved and patient was discharged 12/24/2020 on Lovenox 100 mg every 12 hours.    · Returns 12/29/2020 for evaluation continuing Lovenox, overall tolerating it well.  No bleeding issues.  · Improved edema 1/5/2021.  Will continue Lovenox weight-based every 12 hours.  · On 1/19/2021 and 2/2/2021, patient reports improved facial swelling.  She however does have being bruise on her abdomen wall where she does Lovenox injection.  However she does not have a spontaneous epistaxis, gum bleeding or other bleeding.   · On 2/2/2021, we discussed that side effects of Avastin/biosimilar related to thrombosis.  Since this patient already has been on Lovenox, I think the benefits from treatment for her cancer will outweigh that risk of thrombosis, will proceed ahead with Avastin.  I cautioned patient to watch out for signs of worsening thrombosis patient voiced understanding    4.  This patient also has strong family history for malignancy the patient had appointment with genetic counseling on September 3, 2019.  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    5.  Mild anemia, improved since her surgery.    · She also has a history of iron deficiency.   Iron studies reveal a saturation of just 10%.  She was started on oral iron but was unable to tolerate due to GI side effects.   · Status post Injectafer 2/5/2020 and 2/12/2020   · Improved hemoglobin 13.5 on 1/5/2021.  · Hemoglobin 14.7 on 2/16/2021.  Continue to monitor.      6.  Peripheral neuropathy secondary to chemotherapy.    · This is mild involving bilateral hands and feet as reported on 9/16/2020.   · Will avoid chemotherapy with oxaliplatin.  · Stable mild neuropathy as of 11/10/2020  · Patient reports worsening peripheral neuropathy and cycle #5 chemotherapy on 12/8/2020 with and without irinotecan.   · On 1/5/2021, patient reports improvement of peripheral neuropathy, will add irinotecan back to chemotherapy.  Continue to monitor and adjust medication.  · On 1/19/2021, patient reports no worsening of peripheral neuropathy after restarting irinotecan    7.  History of hand-foot syndrome grade 3.    · It become so significant after cycle #7 FOLFOX treatment.  Discussed with patient on 5/13/2020, will discontinue the bolus 5-FU dose, and also decrease 50% of the infusion dose through the pump.   · We also use Medrol Dosepak for possible recurrent symptomology.  She responded this well.   · Her hand-foot syndrome improved.  · Under current treatment with FOLFIRI, patient not receiving 5-FU bolus.  No recurrent issues.    8.  Hyperlacrimation and mild scleral irritation related to 5-FU.  She has been taking steroid eyedrops.  This has improved her hyperlacrimation.  · Not currently an issue.  Continue to monitor with 5-FU.      9.  Abnormal liver function panel.  · Improved liver function panel 9/18/2020.  This is probably related to her recent infection.  · She has normalized AST, alk phosphatase, and a much improved only marginally elevated ALT 35 on 10/13/2020.    · Normalized liver function panel on 10/27/2020.    · Normal liver panel on 11/10/2020.    · Normal liver function panel on 12/5/2021.     · Slightly worsening liver function with AST 33 and ALT 56 1/19/2021.    · Improved AST 21 and ALT 39 on 2/2/2021.  Continue to monitor.    10.  Intermittent leukocytopenia secondary to chemotherapy.   · WBC currently normal at 5220 and the neutrophil 3520 on 1/5/2021.  Continue to monitor.    · On 2/2/2021, WBC 4110 including ANC 2540.  Continue to monitor for now.  Consider G-CSF if neutropenia becomes an ongoing issue.   · 2/16/2021 WBC 4.6, ANC 2.79.  Continue to monitor.    11.  Depression.  Patient seen by JULIET Davenport on 11/9/2020.  She was started on mirtazapine.  Lexapro was discontinued.  This has definitely improved her mood with the patient feeling overall much better.  She is sleeping better.  Appetite is improved with her actually eating more than she wants to.  She plans to continue follow-up with supportive oncology.    12.  Right neck/shoulder pain occurring twice now.  Patient reports that this occurred with both cycle #2 and cycle #3.  She did not mention it with cycle #2.  She did mention it was cycle #3 and per review of the EMR nursing note it was during irinotecan infusion.  Pain then subsided.  However it returned 5 days later with the patient going to the ED on 11/16/2020.  As imaging details are noted above, no acute findings were found.  Etiology remains unclear.  Patient's Mediport is in the opposite side of her chest.  She was given a prednisone taper which she continues at this time.  Apparently it was not related to irinotecan.     13.  Intermittent upper abdominal discomfort.  This improves with eating.  After further discussion with the patient she also notes 3 nights of increased reflux when laying down.  We discussed her symptoms could be related to increase stomach acid or potential ulcer.  She is currently taking Pepcid 20 mg daily.  I instructed her to add Prilosec 20 mg daily.         PLAN:  1. Proceed with cycle 9 palliative chemotherapy today with infusional 5-FU,  irinotecan, as well as bevacizumab.  2. Continue Lovenox 100 mg every 12 hours.  3. Return in 2 weeks for follow-up visit with Dr. Nguyen for reevaluation prior to cycle 10 chemotherapy.    4. Continue antidiarrheals and supportive care as needed.            Sheba Matias, APRN  02/16/21        CC:  WAYNE Worley MD Carrie B. Scharf, MD

## 2021-02-16 NOTE — NURSING NOTE
Spoke to Sheba RODRIGUEZ about protein found in urinalysis today, stated it was still okay to treat.

## 2021-02-17 DIAGNOSIS — F41.9 ANXIETY: ICD-10-CM

## 2021-02-18 ENCOUNTER — INFUSION (OUTPATIENT)
Dept: ONCOLOGY | Facility: HOSPITAL | Age: 41
End: 2021-02-18

## 2021-02-18 DIAGNOSIS — C20 ADENOCARCINOMA OF RECTUM (HCC): Primary | ICD-10-CM

## 2021-02-18 DIAGNOSIS — Z45.2 ENCOUNTER FOR FITTING AND ADJUSTMENT OF VASCULAR CATHETER: ICD-10-CM

## 2021-02-18 PROCEDURE — 25010000003 HEPARIN LOCK FLUSH PER 10 UNITS: Performed by: INTERNAL MEDICINE

## 2021-02-18 RX ORDER — SODIUM CHLORIDE 0.9 % (FLUSH) 0.9 %
10 SYRINGE (ML) INJECTION AS NEEDED
Status: CANCELLED | OUTPATIENT
Start: 2021-02-18

## 2021-02-18 RX ORDER — HEPARIN SODIUM (PORCINE) LOCK FLUSH IV SOLN 100 UNIT/ML 100 UNIT/ML
500 SOLUTION INTRAVENOUS AS NEEDED
Status: CANCELLED | OUTPATIENT
Start: 2021-02-18

## 2021-02-18 RX ORDER — CLONAZEPAM 1 MG/1
TABLET ORAL
Qty: 90 TABLET | Refills: 0 | Status: SHIPPED | OUTPATIENT
Start: 2021-02-18 | End: 2021-05-24

## 2021-02-18 RX ORDER — SODIUM CHLORIDE 0.9 % (FLUSH) 0.9 %
10 SYRINGE (ML) INJECTION AS NEEDED
Status: DISCONTINUED | OUTPATIENT
Start: 2021-02-18 | End: 2021-02-18 | Stop reason: HOSPADM

## 2021-02-18 RX ORDER — HEPARIN SODIUM (PORCINE) LOCK FLUSH IV SOLN 100 UNIT/ML 100 UNIT/ML
500 SOLUTION INTRAVENOUS AS NEEDED
Status: DISCONTINUED | OUTPATIENT
Start: 2021-02-18 | End: 2021-02-18 | Stop reason: HOSPADM

## 2021-02-18 RX ADMIN — Medication 500 UNITS: at 14:25

## 2021-02-18 RX ADMIN — SODIUM CHLORIDE, PRESERVATIVE FREE 10 ML: 5 INJECTION INTRAVENOUS at 14:25

## 2021-03-02 ENCOUNTER — OFFICE VISIT (OUTPATIENT)
Dept: ONCOLOGY | Facility: CLINIC | Age: 41
End: 2021-03-02

## 2021-03-02 ENCOUNTER — INFUSION (OUTPATIENT)
Dept: ONCOLOGY | Facility: HOSPITAL | Age: 41
End: 2021-03-02

## 2021-03-02 VITALS
RESPIRATION RATE: 16 BRPM | TEMPERATURE: 97.5 F | BODY MASS INDEX: 34.99 KG/M2 | HEART RATE: 113 BPM | DIASTOLIC BLOOD PRESSURE: 89 MMHG | WEIGHT: 210 LBS | HEIGHT: 65 IN | OXYGEN SATURATION: 95 % | SYSTOLIC BLOOD PRESSURE: 134 MMHG

## 2021-03-02 DIAGNOSIS — C20 ADENOCARCINOMA OF RECTUM (HCC): Primary | Chronic | ICD-10-CM

## 2021-03-02 DIAGNOSIS — C20 ADENOCARCINOMA OF RECTUM (HCC): Primary | ICD-10-CM

## 2021-03-02 DIAGNOSIS — C20 ADENOCARCINOMA OF RECTUM (HCC): ICD-10-CM

## 2021-03-02 DIAGNOSIS — C78.00 MALIGNANT NEOPLASM METASTATIC TO LUNG, UNSPECIFIED LATERALITY (HCC): ICD-10-CM

## 2021-03-02 LAB
ALBUMIN SERPL-MCNC: 3.9 G/DL (ref 3.5–5.2)
ALBUMIN/GLOB SERPL: 1.1 G/DL (ref 1.1–2.4)
ALP SERPL-CCNC: 121 U/L (ref 38–116)
ALT SERPL W P-5'-P-CCNC: 59 U/L (ref 0–33)
ANION GAP SERPL CALCULATED.3IONS-SCNC: 10.8 MMOL/L (ref 5–15)
AST SERPL-CCNC: 29 U/L (ref 0–32)
BASOPHILS # BLD AUTO: 0.02 10*3/MM3 (ref 0–0.2)
BASOPHILS NFR BLD AUTO: 0.5 % (ref 0–1.5)
BILIRUB SERPL-MCNC: 0.3 MG/DL (ref 0.2–1.2)
BILIRUB UR QL STRIP: ABNORMAL
BUN SERPL-MCNC: 8 MG/DL (ref 6–20)
BUN/CREAT SERPL: 8.9 (ref 7.3–30)
CALCIUM SPEC-SCNC: 9.5 MG/DL (ref 8.5–10.2)
CHLORIDE SERPL-SCNC: 102 MMOL/L (ref 98–107)
CLARITY UR: CLEAR
CO2 SERPL-SCNC: 23.2 MMOL/L (ref 22–29)
COLOR UR: ABNORMAL
CREAT SERPL-MCNC: 0.9 MG/DL (ref 0.6–1.1)
DEPRECATED RDW RBC AUTO: 55.9 FL (ref 37–54)
EOSINOPHIL # BLD AUTO: 0.18 10*3/MM3 (ref 0–0.4)
EOSINOPHIL NFR BLD AUTO: 4.2 % (ref 0.3–6.2)
ERYTHROCYTE [DISTWIDTH] IN BLOOD BY AUTOMATED COUNT: 15.5 % (ref 12.3–15.4)
GFR SERPL CREATININE-BSD FRML MDRD: 69 ML/MIN/1.73
GLOBULIN UR ELPH-MCNC: 3.4 GM/DL (ref 1.8–3.5)
GLUCOSE SERPL-MCNC: 112 MG/DL (ref 74–124)
GLUCOSE UR STRIP-MCNC: NEGATIVE MG/DL
HCT VFR BLD AUTO: 47.8 % (ref 34–46.6)
HGB BLD-MCNC: 16.2 G/DL (ref 12–15.9)
HGB UR QL STRIP.AUTO: ABNORMAL
IMM GRANULOCYTES # BLD AUTO: 0.01 10*3/MM3 (ref 0–0.05)
IMM GRANULOCYTES NFR BLD AUTO: 0.2 % (ref 0–0.5)
KETONES UR QL STRIP: NEGATIVE
LEUKOCYTE ESTERASE UR QL STRIP.AUTO: NEGATIVE
LYMPHOCYTES # BLD AUTO: 1.12 10*3/MM3 (ref 0.7–3.1)
LYMPHOCYTES NFR BLD AUTO: 26.4 % (ref 19.6–45.3)
MCH RBC QN AUTO: 33.6 PG (ref 26.6–33)
MCHC RBC AUTO-ENTMCNC: 33.9 G/DL (ref 31.5–35.7)
MCV RBC AUTO: 99.2 FL (ref 79–97)
MONOCYTES # BLD AUTO: 0.48 10*3/MM3 (ref 0.1–0.9)
MONOCYTES NFR BLD AUTO: 11.3 % (ref 5–12)
NEUTROPHILS NFR BLD AUTO: 2.44 10*3/MM3 (ref 1.7–7)
NEUTROPHILS NFR BLD AUTO: 57.4 % (ref 42.7–76)
NITRITE UR QL STRIP: NEGATIVE
NRBC BLD AUTO-RTO: 0 /100 WBC (ref 0–0.2)
PH UR STRIP.AUTO: 6 [PH] (ref 4.5–8)
PLATELET # BLD AUTO: 222 10*3/MM3 (ref 140–450)
PMV BLD AUTO: 9.1 FL (ref 6–12)
POTASSIUM SERPL-SCNC: 4.2 MMOL/L (ref 3.5–4.7)
PROT SERPL-MCNC: 7.3 G/DL (ref 6.3–8)
PROT UR QL STRIP: ABNORMAL
RBC # BLD AUTO: 4.82 10*6/MM3 (ref 3.77–5.28)
SODIUM SERPL-SCNC: 136 MMOL/L (ref 134–145)
SP GR UR STRIP: >=1.03 (ref 1–1.03)
UROBILINOGEN UR QL STRIP: ABNORMAL
WBC # BLD AUTO: 4.25 10*3/MM3 (ref 3.4–10.8)

## 2021-03-02 PROCEDURE — 96375 TX/PRO/DX INJ NEW DRUG ADDON: CPT

## 2021-03-02 PROCEDURE — 25010000002 PALONOSETRON PER 25 MCG: Performed by: INTERNAL MEDICINE

## 2021-03-02 PROCEDURE — 25010000002 FOSAPREPITANT PER 1 MG: Performed by: INTERNAL MEDICINE

## 2021-03-02 PROCEDURE — 96367 TX/PROPH/DG ADDL SEQ IV INF: CPT

## 2021-03-02 PROCEDURE — 99215 OFFICE O/P EST HI 40 MIN: CPT | Performed by: INTERNAL MEDICINE

## 2021-03-02 PROCEDURE — 96413 CHEMO IV INFUSION 1 HR: CPT

## 2021-03-02 PROCEDURE — 96368 THER/DIAG CONCURRENT INF: CPT

## 2021-03-02 PROCEDURE — 80053 COMPREHEN METABOLIC PANEL: CPT

## 2021-03-02 PROCEDURE — 25010000002 ATROPINE PER 0.01 MG: Performed by: INTERNAL MEDICINE

## 2021-03-02 PROCEDURE — 25010000002 BEVACIZUMAB 100 MG/4ML SOLUTION 4 ML VIAL: Performed by: INTERNAL MEDICINE

## 2021-03-02 PROCEDURE — 96416 CHEMO PROLONG INFUSE W/PUMP: CPT

## 2021-03-02 PROCEDURE — 25010000002 BEVACIZUMAB PER 10 MG: Performed by: INTERNAL MEDICINE

## 2021-03-02 PROCEDURE — 25010000002 FLUOROURACIL PER 500 MG: Performed by: INTERNAL MEDICINE

## 2021-03-02 PROCEDURE — 25010000002 LEUCOVORIN CALCIUM PER 50 MG: Performed by: INTERNAL MEDICINE

## 2021-03-02 PROCEDURE — 85025 COMPLETE CBC W/AUTO DIFF WBC: CPT

## 2021-03-02 PROCEDURE — 25010000002 DEXAMETHASONE SODIUM PHOSPHATE 100 MG/10ML SOLUTION: Performed by: INTERNAL MEDICINE

## 2021-03-02 PROCEDURE — 25010000002 IRINOTECAN PER 20 MG: Performed by: INTERNAL MEDICINE

## 2021-03-02 PROCEDURE — 81003 URINALYSIS AUTO W/O SCOPE: CPT

## 2021-03-02 PROCEDURE — 96417 CHEMO IV INFUS EACH ADDL SEQ: CPT

## 2021-03-02 PROCEDURE — 96415 CHEMO IV INFUSION ADDL HR: CPT

## 2021-03-02 RX ORDER — ATROPINE SULFATE 1 MG/ML
0.25 INJECTION, SOLUTION INTRAMUSCULAR; INTRAVENOUS; SUBCUTANEOUS
Status: CANCELLED | OUTPATIENT
Start: 2021-03-02

## 2021-03-02 RX ORDER — PALONOSETRON 0.05 MG/ML
0.25 INJECTION, SOLUTION INTRAVENOUS ONCE
Status: COMPLETED | OUTPATIENT
Start: 2021-03-02 | End: 2021-03-02

## 2021-03-02 RX ORDER — SODIUM CHLORIDE 9 MG/ML
250 INJECTION, SOLUTION INTRAVENOUS ONCE
Status: COMPLETED | OUTPATIENT
Start: 2021-03-02 | End: 2021-03-02

## 2021-03-02 RX ORDER — PALONOSETRON 0.05 MG/ML
0.25 INJECTION, SOLUTION INTRAVENOUS ONCE
Status: CANCELLED | OUTPATIENT
Start: 2021-03-02

## 2021-03-02 RX ORDER — SODIUM CHLORIDE 9 MG/ML
250 INJECTION, SOLUTION INTRAVENOUS ONCE
Status: CANCELLED | OUTPATIENT
Start: 2021-03-02

## 2021-03-02 RX ORDER — ATROPINE SULFATE 0.4 MG/ML
0.25 AMPUL (ML) INJECTION
Status: DISCONTINUED | OUTPATIENT
Start: 2021-03-02 | End: 2021-03-02 | Stop reason: HOSPADM

## 2021-03-02 RX ADMIN — FLUOROURACIL 4850 MG: 50 INJECTION, SOLUTION INTRAVENOUS at 12:47

## 2021-03-02 RX ADMIN — PALONOSETRON 0.25 MG: 0.05 INJECTION, SOLUTION INTRAVENOUS at 09:39

## 2021-03-02 RX ADMIN — DEXAMETHASONE SODIUM PHOSPHATE 12 MG: 10 INJECTION, SOLUTION INTRAMUSCULAR; INTRAVENOUS at 10:12

## 2021-03-02 RX ADMIN — ATROPINE SULFATE 0.25 MG: 0.4 INJECTION, SOLUTION INTRAMUSCULAR; INTRAVENOUS; SUBCUTANEOUS at 11:05

## 2021-03-02 RX ADMIN — SODIUM CHLORIDE 100 ML: 9 INJECTION, SOLUTION INTRAVENOUS at 09:39

## 2021-03-02 RX ADMIN — BEVACIZUMAB 950 MG: 400 INJECTION, SOLUTION INTRAVENOUS at 10:30

## 2021-03-02 RX ADMIN — LEUCOVORIN CALCIUM 810 MG: 350 INJECTION, POWDER, LYOPHILIZED, FOR SOLUTION INTRAMUSCULAR; INTRAVENOUS at 11:06

## 2021-03-02 RX ADMIN — DEXTROSE MONOHYDRATE 365 MG: 50 INJECTION, SOLUTION INTRAVENOUS at 11:07

## 2021-03-02 RX ADMIN — SODIUM CHLORIDE 250 ML: 9 INJECTION, SOLUTION INTRAVENOUS at 09:39

## 2021-03-02 NOTE — PROGRESS NOTES
REASON FOR FOLLOW UP:     1. Rectal cancer, rectal ultrasound examination in July 2019 reported T3N0 disease without lymphadenopathy. She does have small pulmonary nodule 6-7 mm and 2 mm with indeterminate feature. There is no solid evidence of metastatic disease otherwise. Patient has stage IIA (T3N0M0) disease.  2. The patient is heterozygous factor V Leiden, was on prophylactic anticoagulation with Lovenox 40 mg daily given her increased risk of thrombosis with MediPort and GI malignancy.   3. PET scan on 8/8/2019 reported an 8 mm hypermetabolic right upper lobe pulmonary nodule, which is suspicious for metastatic as well.    4. Patient had a PowerPort placement on the left upper chest by Dr. Joseph on 8/9/2019.  5. Patient was started on concurrent infusional 5-FU chemoradiation therapy on 8/12/2019, with planned complete surgical resection and further adjuvant chemotherapy with intention to cure the disease.   6. Patient underwent surgical resection of the primary rectal cancer by Dr. Ye on 12/2/2019.  Pathology evaluation reported residual T3N1 disease stage IIIb.  7. Diarrhea related to therapy and radiation.   8. Patient was started cycle 1 day 1 of adjuvant FOLFOX 6 on 1/23/2020.  9. On 2/5/2020 FOLFOX 6 cycle 2 delayed secondary to neutropenia.  Patient was given 3 days of Granix injection.  After cycle #2 we planned 3 days of Granix with each cycle.   10. Patient also intolerant of oral iron.  Patient received 2 doses of IV injectafer on 02/05/2020 and 02/12/2020.   11. 02/12/2020 Proceed with cycle #2 of FOLFOX 6 with 3 days of Granix.  12. On 3/11/2020 cycle 4 postponed secondary to abdominal pain and occasional low-grade fevers.  CT scans ordered.  13. Cycle #4 resumed after CT scan revealed no evidence of disease.  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 Ye. patient has no fever.  Continue to observe.   14. Grade 3  hand-foot syndrome secondary to 5-FU after cycle #7 FOLFOX 6 chemotherapy, prompting ER visit.  Also has worsening peripheral neuropathy.   15. Cycle #8 FOLFOX 6 was given on 5/13/2020, with 50% dose reduction for both 5-FU and oxaliplatin, and also examination of bolus 5-FU.   16. PET scan examination on 5/21/2020 reported no evidence of metastatic disease in the chest abdomen pelvis.  Stable 6 mm RUL pulmonary nodule.  17. On 5/27/2020, I discussed with the patient that we can discontinue chemotherapy at this time.   18. Patient had a surgical procedure for low anterior colon resection, coloanal anastomosis on 8/3/2020.  19. CT scan for chest abdomen pelvis on 9/9/2020 reported a new 8 mm noncalcified pulmonary nodule in the left lower lobe of the lung.  Otherwise stable right upper lobe 6 mm pulmonary nodule, and no evidence of disease recurrence in the abdomen or pelvis.  20. PET scan examination on 9/18/2020 reported multiple hypermetabolic small pulmonary nodules/ new pulmonary nodules.   21. Patient was started on pelvic chemotherapy with FOLFIRI regimen on 10/13/2020.   22. 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 It reported tumor mutation burden 5 Muts/Mb, microsatellite stable, TP53 mutation R282W, and others.   23. Cycle #5 was without irinotecan, due to peripheral neuropathy.  24. 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  Patient was switched to weight-based Lovenox injection.  25. Cycle #6 5-FU and irinotecan was delayed by 2 weeks because of the above incident.  26. Patient had a telemedicine evaluation at that the [facility] cancer [place] in February 2021.  They agreed with our treatment plan for palliative chemotherapy followed by maintenance chemotherapy.      HISTORY OF PRESENT ILLNESS:  The patient is a 40 y.o. female with the above mentioned  history here today for lab review and evaluation prior to cycle #10 FOLFIRI and bevacizumab.      Patient reports she had a telemedicine evaluation at the [facility] on 2/11/2021.  I reviewed the medical records from [facility], who agreed with our treatment plan with palliative chemotherapy followed by maintenance chemotherapy.  No need for surgery to resect the metastatic lung disease.    Patient herself is also agreeable with this condition.  She is here to get further chemotherapy.    Patient reports she is doing well with ongoing palliative chemotherapy.  She has liquid stool output from her ostomy.  She reports no bleeding.  She does use Lomotil sometimes to slow down the volume.  Otherwise she has been doing well, denies fever sweating chills.  No chest pain no dyspnea.  No worsening facial swelling.    She continues on Lovenox every 12 hours for anticoagulation.    Laboratory studies today on 3/2/2021 reported hemoglobin 16.2, normal platelets 222,000 and WBC 4250 including ANC 2440.  Chemistry lab reported a stable mildly elevated ALT 59, otherwise unremarkable CMP including normal renal function creatinine 0.9 and normal electrolytes.  Urinalysis reported a 1+ protein, no evidence for infection.    Past Medical History:   Diagnosis Date   • Abdominopelvic abscess (CMS/HCC) 08/12/2020    ADMITTED TO [facility]   • Abnormal Pap smear of cervix 02/02/1998    JULIUS I   • Anemia in pregnancy    • Anxiety    • Bilateral epiphora 04/2020   • Bilateral hand swelling 05/02/2020    SEEN AT [facility] ER   • Cervical lymphadenitis 06/06/2012    SEEN AT [facility] ER   • Chemotherapy induced neutropenia (CMS/HCC) 04/2020   • Chemotherapy-induced nausea 02/2020   • Chemotherapy-induced thrombocytopenia 05/2020   • Chronic diarrhea    • Colon polyps     FOLLOWED BY DR. GERONIMO ESPARZA   • Cystitis 04/24/2020    WITH DEHYDRATION, ADMITTED TO [facility]   • Cystitis 10/27/2020   • Drug-induced peripheral neuropathy (CMS/HCC)  2020   • Fistula of intestine    • GERD (gastroesophageal reflux disease)    • Hand foot syndrome 2020   • Heart murmur     IN CHILDHOOD   • Hematochezia    • Hemorrhoids    • Heterozygous factor V Leiden mutation (CMS/[facility])     DX 2019   • History of anemia    • History of chemotherapy     FOLLOWED BY DR. ALEXANDRU HUNT   • History of gestational diabetes    • History of pre-eclampsia    • History of radiation therapy     FOLLOWED BY DR. JAVON LEWIS   • History of TB skin testing 2009    TB Skin Test   • HPV in female    • Hypokalemia 2019   • Hypomagnesemia 2019   • IBS (irritable bowel syndrome)    • Ileostomy in place (CMS/[facility])     FOLLOWED BY DR. GERONIMO ESPARZA   • Infected insect bite of neck 2016    SEEN AT [facility]   • Kidney stones 2007    SEEN AT [facility] ER   • Lump of right breast     SWOLLEN LYMPH NODE   • Lung cancer (CMS/[facility]) 2020    METASTATIC LUNG CANCER   • Monopolar depression (CMS/[facility])    • On anticoagulant therapy    • Perirectal abscess 2020   • PIH (pregnancy induced hypertension)    • Pulmonary embolism without acute cor pulmonale (CMS/HCC) 09/20/2019    X 3; ADMITTED TO [facility]   • Pulmonary nodule, right 2020   • Rectal cancer (CMS/HCC) 2019    STAGE IIA, INVASIVE MODERATELY DIFFERENTIATED ADENOCARCINOMA, CHEMO AND XRT FINISHED 2019   • Right shoulder pain 2020    SEEN AT [facility] ER   • Right ureteral stone 10/01/2019    SEEN AT [facility] ER   • SAB (spontaneous ) 1996     A2-1 INDUCED   • Sciatica    • Sepsis due to cellulitis (CMS/HCC) 2002    LEFT GREAT TOE, ADMITTED TO [facility]   • Tachycardia 2020   • Urinary urgency 2020     Past Surgical History:   Procedure Laterality Date   • CHOLECYSTECTOMY N/A 10/10/2003    LAPAROSCOPIC WITH CHOLANGIOGRAM, DR. JAMEY TALAVERA AT [facility]   • COLON RESECTION N/A 2019    Procedure: laparoscopic low anterior colon resection with mobilization of splenic flexure  and diverting loop ileostomy: ERAS;  Surgeon: Geronimo Ye MD;  Location: [facility] MAIN OR;  Service: General   • COLON RESECTION N/A 8/3/2020    Procedure: LOW ANTERIOR COLON RESECTION, COLOANAL ANASTOMOSIS, MOBILIZATION SPLENIC FLEXURE;  Surgeon: Geronimo Ye MD;  Location: [facility] MAIN OR;  Service: General;  Laterality: N/A;   • COLONOSCOPY N/A 7/15/2020    PATENT ANASTAMOSIS IN MID RECTUM, RESCOPE IN 1 YR, DR. GERONIMO YE AT [facility]   • COLONOSCOPY W/ POLYPECTOMY N/A 07/08/2019    15 MM TUBULOVILLOUS ADENOMA POLYP IN HEPATIC FLEXURE, 20 MMTUBULOVILLOUS ADENOMA WITH HIGH GRADE DYSPLASIA IN RECTOSIGMOID, 4 CM MASS IN MID RECTUM, PATH: INVASIVE MODERATELY DIFFERENTIATED ADENOCARCINOMA, DR. JENNIFER LI AT [facility]   • DILATATION AND EVACUATION N/A 2009   • ENDOSCOPY N/A 07/08/2019    LA GRADE A ESOPHAGITIS, GASTRITIS, ALL BIOPSIES BENIGN, DR. JENNIFER LI AT [facility]   • INCISION AND DRAINAGE PERIRECTAL ABSCESS N/A 8/14/2020    Procedure: INCISION AND DRAINAGE OF retrorectal dehiscence abcess with drain placement and irrigation;  Surgeon: Geronimo Ye MD;  Location: [facility] MAIN OR;  Service: General;  Laterality: N/A;   • PAP SMEAR N/A 01/24/2014   • SIGMOIDOSCOPY N/A 7/24/2019    INFILTRATIVE PARTIALLY OBSTRUCTING LARGE RECTAL CANCER, AREA TATOOED, DR. GERONIMO YE AT [facility]   • SIGMOIDOSCOPY N/A 11/23/2019    AN ULCERATED PARTIALLY OBSTRUCTING MASS IN MID RECTUM, AREA TATTOOED, DR. GERONIMO YE AT [facility]   • TRANSRECTAL ULTRASOUND N/A 7/24/2019    Procedure: ULTRASOUND TRANSRECTAL;  Surgeon: Geronimo Ye MD;  Location: [facility] ENDOSCOPY;  Service: General   • URETEROSCOPY LASER LITHOTRIPSY WITH STENT INSERTION Right 10/30/2019    DR. ESTUARDO BEASLEY AT [place]   • VAGINAL DELIVERY N/A 09/18/1998   • VENOUS ACCESS DEVICE (PORT) INSERTION Left 8/9/2019    Procedure: INSERTION VENOUS ACCESS DEVICE;  Surgeon: Sj Joseph MD;  [facility];  Service: General   • VENOUS  ACCESS DEVICE (PORT) INSERTION N/A 12/18/2020    Procedure: INSERTION VENOUS ACCESS DEVICE right side, removal venous access device left side;  Surgeon: Sj Joseph MD;  Location: [facility] OR [facility];  Service: General;  Laterality: N/A;   • WISDOM TOOTH EXTRACTION Bilateral 1993       HEMATOLOGIC/ONCOLOGIC HISTORY:      The patient reports she has intermittent rectal bleeding and urgency, mucous with her stool, starting sometime in 2016. At that time she was referred to GI service, and was diagnosed of irritable bowel syndrome. Nevertheless she had worsening urgency for bowel movement, and had a couple of incidents losing control of stool. She was recently seen by Roland Thorpe MD again and had colonoscopy and EGD exam on 07/08/2019. She was found having a circumferential rectal mass. According to the procedure note, the patient had a fungating circumferential bleeding 4 cm mass in the middle rectum, at distance between 13 cm and 17 cm from the anus. Mass was causing partial obstruction. There were also colon polyps found at the hepatic flexure and also at the rectosigmoid junction 23 cm from the anus. Both were resected and retrieved. EGD examination reported grade A esophagitis at the GE junction and patchy discontinuous erythema and aggravation of the mucosa without bleeding in the stomach body. There is normal mucosa of the duodenum. Pathology evaluation from this procedure reported moderately differentiated adenocarcinoma involving the rectal mass. The rectal sigmoid junction polyp was tubular/tubulovillous adenoma with high grade dysplasia negative for invasive malignancy. The hepatic flexure polyp was also tubular/tubulovillous adenoma negative for high grade dysplasia or malignancy. The biopsy from the esophagus reports squamocolumnar mucosa with inflammatory changes suggestive of mild reflux esophagitis, negative for interstitial metaplasia. Gastric biopsy was benign and duodenal biopsy was also benign.  There is no mention of H-pylori.     Patient was subsequently referred to colorectal surgeon Padmaja Ye MD for further evaluation. The patient had CT scan examination for chest, abdomen and pelvis requested by Dr. Ye and were done on 07/13/2019. The study reported no evidence of lymphadenopathy in the abdomen and pelvis. There was wall thickening of the rectosigmoid junction. Normal spleen, pancreas, adrenal glands and kidneys. There was fatty liver infiltration but no focal lymphatic lesions. There was a small 6-7 mm noncalcified nodule in the right upper lobe and a tiny 3 mm subpleural nodule in the right middle lobe. No mediastinal or hilar lymphadenopathy.     Dr. Ye performed staging rectal ultrasound examination. This study reported tumor penetrating through the muscularis propria as T3 disease; there were no lymph nodes identified.    She had a hospitalization in late September 2019 because of newly discovered pulmonary emboli.  She was on prophylactic Lovenox prior to the incident of PE.  Now she is on full dose Xarelto anticoagulation.  Patient reports no further chest pain dyspnea.  She denies bleeding or bruising.  During her hospitalization, she was seen by Dr. Ye, who plans to have surgery 8 to 12 weeks after finishing radiation therapy.  She finished her radiation on 9/19/2019.     I noticed patient also presented to the emergency room on 10/1/2019 complaining of right flank area pain.  I reviewed the images studies and indeed she has a very small 1 to 2 mm obstructing kidney stone in the UV junction.  Patient is still symptomatic with some pains and dysuria.  She denies fever sweating or chills.    Laboratory study on 10/7/2019 reported normal CBC including hemoglobin 13.1, platelets 301,000, WBC 6170 and ANC 4900 lymphocytes 590 monocytes 480.      The nurse reported malfunction of port-a-catheter on 10/7/2019.  Port study on 10/8/2019 showed fibrin sheath around the distal aspect of the  Mediport catheter in the SVC. This does not appear to hinder injection, but does prevent aspiration at this time.    Patient underwent surgical resection of the colon on 12/2/2019 with Dr. Ye.  Pathology evaluation reported residual T3 disease, also 1 out of 14 lymph nodes positive for malignancy.  So this patient in either had at least stage IIIb disease (T3 N1 M0?).      Adjuvant chemotherapy FOLFOX 6 will be started on 1/22/2020.  Laboratory study reported iron saturation 10%, free iron 31 TIBC 319 and ferritin 168.  Her hemoglobin was 11.8, WBC 5600, and platelets 347,000.    Patient was here on 02/12/2020 for cycle 2 of her FOLFOX.  This is delayed x1 week secondary to neutropenia.  The patient ultimately received 3 days worth of Neupogen with recovery of her blood counts.  Of note, the patient struggled with significant nausea without any episodes of vomiting following cycle #1 of chemotherapy resulting in significant weight loss.  She is up 12 pounds over the last week as her appetite has normalized.  We will add Emend to her care plan.    She is having several loose stools per her colostomy and has been hesitant to take Imodium due to prior history of constipation.  I encouraged her to try this with a maximum of 8 tablets/day.  She denies any infectious symptoms including fevers or chills.  No excess bleeding or bruising noted.  She had the expected cold sensitivity related to the Oxaliplatin for about 3 days following treatment.    Labs from 02/12/2020 demonstrated total white blood cell count of 5.14, ANC of 3.06, hemoglobin of 11.2, platelets of 211,000.  She was found to be iron deficient last week and is intolerant to oral iron secondary to GI distress.  For this reason, she initiated IV iron therapy with Injectafer last week.  She had received her second dose 02/12/2020    Patient has normalized hemoglobin 12.2 on 2/26/2020.    She reported on 5/5/2020 she had a recent visit to the emergency  department for what was suspected to be an allergic reaction.  She called our on-call service on Saturday with reports of hand and face swelling.  She was instructed to proceed to the emergency department at which time she was assessed and prescribed a Medrol Dosepak.  She had just completed her 5-FU and was unhooked on Friday, 5/3/2020.  Her symptoms have improved.  She does report persistent hyperpigmentation and mild swelling of the palms of the hands but this is much improved.  It was her right hand specifically that was swollen.  Her facial swelling has resolved.  She continues on Cefdinir nd since has 1 day remaining, she was prescribed cefdinir for a UTI requiring hospitalization at the end of April.  Reports no new symptoms.  Her labs are stable.  She is scheduled for treatment again.    Patient states at this time she is not tolerating her chemotherapy well.     Patient seen in the emergency department on 5/2/2020 for what was suspected to be an allergic reaction.  She called our on-call service on Saturday explaining that she was experiencing hand and face swelling.  She was instructed to proceed to the emergency department at which time she was assessed and prescribed a Medrol Dosepak.  She had just completed her 5-FU and was unhooked on Friday, 5/1/2020.      She reports since her ED visit on 5/2/2020 her symptoms have not improved. Her hands and feet were swollen upon presenting to the ED and she could barely make a fist. She states that she feels so swollen she is not able to stand it. She states that her skin on the hands and feet are peeling extensively as well, besides changing color to more dark.     She also reports significant fatigue after her first week of the 5-FU treatment but she expected this side effect. She also notices significant increase in her neuropathy to the point that she is not able to even walk around in her home without her house slippers due to irritation from her rugs. She  denies and associated nausea or vomiting at this time. She also has episodes of epistaxis every day after the chemotherapy cycle #7.  She does report working full-time during the week of chemotherapy.    Laboratory studies, 5/13/2020, show moderate thrombocytopenia platelets 123,000, low normal WBC 4140 including ANC 2720 and normal hemoglobin 13.4.  Because significant hand-foot syndrome, will decrease both 5-FU and oxaliplatin by 50%, and eliminate bolus dose of 5-FU.    On 5/21/2020 patient had a PET scan performed which showed no convincing evidence of residual disease in abdomen or pelvis, no metastatic disease within the chest or neck.     Cycle #8 FOLFOX 6 was given on 5/13/2020, with 50% dose reduction for both 5-FU and oxaliplatin, and also examination of bolus 5-FU.  She states on 5/27/2020 that with the recent reduction of the chemotherapy she feels significantly better. She has more energy and is able to do her daily routine.      PET scan examination on 5/21/2020 reported no evidence of metastatic disease in chest abdomen pelvis.  There was a stable 6 mm right upper lobe pulmonary nodule.    Laboratory studies on 5/27/2020 showed mild leukocytopenia WBC 3720 but a normal ANC 2250 and lymphocytes 630.  Normal platelets 163,000 and hemoglobin 12.6.  Chemistry lab reported normal renal function, liver function panel and a electrolytes, elevated glucose 164.    Laboratory studies 6/24/2020, showed normal hemoglobin 13.4 but macrocytosis .9.  Normal platelets 210,000 and WBC 5870.  She had normal CMP.     Patient last time was here in late June 2020.  Since that time she had surgical procedure for low anterior colon resection, coloanal anastomosis on 8/3/2020.  She later developed a perirectal abscess, required surgical drainage on 8/14/2020.  She was discharged on 8/27/2020.    Patient reports to me she still has lower abdominal wall vacuum suction in place.  She denies fever sweating chills.   Performance status is ECOG 1.  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  She does have visiting nurse come to the home for wound care.    CT scan for chest abdomen pelvis on 9/9/2020 reported a new 8 mm noncalcified pulmonary nodule in the left lower lobe.  Otherwise stable right upper lobe 6 mm pulmonary nodule, and no evidence of disease recurrence in the abdomen or pelvis.     Laboratory study on 9/16/2020 reported elevated AST 55, ALT 95, alk phosphatase 143 but normal total bilirubin 0.3.  Chemistry lab otherwise unremarkable.  She has completed normal CBC.      Because of the abnormal CT scan examination for chest abdomen pelvis on 9/9/2020 reported a new 8 mm noncalcified pulmonary nodule in the left lower lobe, we obtained a PET scan examination for further evaluation, which was done on 9/18/2020.  This study reported several pulmonary nodules, some of them are hypermetabolic, newly developed compared to previous PET scan in May 2020.  Certainly does highly suspicious for metastatic disease.  There are also hypermetabolic activity in the abdomen and pelvic area which is hard to differentiate from surgical scar versus metastatic malignancy.    Laboratory study on 10/13/2020 reported normal CBC with Hb 13.9, platelets 302,000 and WBC 5520 including ANC 3830.  Chemistry lab reported normalized AST 20, alk phosphatase 116 improved ALT 35, and maintains normal bilirubin, with normal electrolytes and liver function panel.     Patient was started on first cycle of palliative chemotherapy FOLFIRI on 10/13/2020.    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  Patient had a new port placed 12/18/2020, and had held her Xarelto for 2 days prior to surgery.  She presented to the ER on 12/21/20 with complaints of facial and arm  swelling for 3 days.  She also noted increased shortness of breath.  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  Thrombus within the right internal jugular vein cannot be excluded.  Patient was started on IV heparin, and eventually transitioned to weight-based Lovenox, which she now continues.      MEDICATIONS    Current Outpatient Medications:   •  clonazePAM (KlonoPIN) 1 MG tablet, TAKE 1 TABLET BY MOUTH THREE TIMES A DAY AS NEEDED FOR ANXIETY OR SEIZURES, Disp: 90 tablet, Rfl: 0  •  Diclofenac Sodium (VOLTAREN) 1 % gel gel, Apply 4 g topically to the appropriate area as directed 3 (Three) Times a Day., Disp: 150 g, Rfl: 3  •  dicyclomine (BENTYL) 20 MG tablet, TAKE 1 TABLET BY MOUTH EVERY 6 HOURS AS NEEDED, Disp: 360 tablet, Rfl: 0  •  diphenoxylate-atropine (LOMOTIL) 2.5-0.025 MG per tablet, TAKE 1 TABLET BY MOUTH 4 (FOUR) TIMES A DAY AS NEEDED FOR DIARRHEA., Disp: 120 tablet, Rfl: 1  •  enoxaparin (Lovenox) 100 MG/ML solution syringe, Inject 1 mL under the skin into the appropriate area as directed Every 12 (Twelve) Hours., Disp: 60 mL, Rfl: 2  •  escitalopram (Lexapro) 10 MG tablet, Take 1 tablet by mouth Daily., Disp: 30 tablet, Rfl: 1  •  famotidine (PEPCID) 20 MG tablet, TAKE 1 TABLET BY MOUTH TWICE A DAY (Patient taking differently: Take 20 mg by mouth 2 (Two) Times a Day.), Disp: 180 tablet, Rfl: 1  •  HYDROcodone-acetaminophen (NORCO) 7.5-325 MG per tablet, Take 1 tablet by mouth Every 6 (Six) Hours As Needed for Moderate Pain ., Disp: 240 tablet, Rfl: 0  •  Loratadine (CLARITIN) 10 MG capsule, Take 10 mg by mouth Every Evening., Disp: , Rfl:   •  ondansetron (ZOFRAN) 8 MG tablet, Take 1 tablet by mouth 3 (Three) Times a Day As Needed for Nausea or Vomiting., Disp: 60 tablet, Rfl: 5  •  prochlorperazine (COMPAZINE) 10 MG tablet, Take 1 tablet by mouth Every 6 (Six) Hours As Needed for Nausea or Vomiting., Disp: 30 tablet, Rfl: 2  No current facility-administered  medications for this visit.     Facility-Administered Medications Ordered in Other Visits:   •  atropine injection 0.25 mg, 0.25 mg, Intravenous, Q15 Min PRN, Dong Nguyen MD PhD  •  bevacizumab (AVASTIN) 950 mg in sodium chloride 0.9 % 138 mL chemo IVPB, 10 mg/kg (Treatment Plan Recorded), Intravenous, Once, Dong Nguyen MD PhD  •  dexamethasone (DECADRON) IVPB 12 mg, 12 mg, Intravenous, Once, Dong Nguyen MD PhD, Last Rate: 200 mL/hr at 03/02/21 1012, 12 mg at 03/02/21 1012  •  fluorouracil (ADRUCIL) 4,850 mg, sodium chloride 0.9 % 143 mL 240 mL chemo infusion - FOR HOME USE, 2,400 mg/m2 (Treatment Plan Recorded), Intravenous, Once, Dong Nguyen MD PhD  •  irinotecan (CAMPTOSAR) 365 mg in dextrose (D5W) 5 % 518.3 mL chemo IVPB, 180 mg/m2 (Treatment Plan Recorded), Intravenous, Once, Dong Nguyen MD PhD  •  leucovorin 810 mg in dextrose (D5W) 5 % 290.5 mL IVPB, 400 mg/m2 (Treatment Plan Recorded), Intravenous, Once, Dong Nguyen MD PhD    ALLERGIES:   No Known Allergies    SOCIAL HISTORY:       Social History     Tobacco Use   • Smoking status: Current Some Day Smoker     Packs/day: 1.00     Years: 24.00     Pack years: 24.00     Types: Electronic Cigarette, Cigarettes   • Smokeless tobacco: Never Used   • Tobacco comment: LAST CIGARETTE 8/12/2020   Substance Use Topics   • Alcohol use: Not Currently   • Drug use: No         FAMILY HISTORY:   Mother has positive factor V Leiden mutation, although she did not have thrombosis, mother also is coronary disease with stenting, she also is occasional bruising.  Maternal grandmother had DVT, she had multiple surgical procedures.  Patient mother's half-brother had metastatic colon cancer at diagnosis in his 50s.  Maternal great aunt has breast cancer.  Patient will follow his skin cancer in his 60s, exclusive.    REVIEW OF SYSTEMS:  Review of Systems   Constitutional: Negative for activity change, appetite change, chills, fatigue, fever and  "unexpected weight change.   HENT: Negative for hearing loss, mouth sores, nosebleeds and trouble swallowing.    Eyes: Negative for photophobia and visual disturbance.   Respiratory: Negative for cough, chest tightness and shortness of breath.    Cardiovascular: Negative for chest pain, palpitations and leg swelling.   Gastrointestinal: Positive for diarrhea. Negative for abdominal distention, abdominal pain, anal bleeding, constipation and nausea.   Endocrine: Negative for cold intolerance and heat intolerance.   Genitourinary: Negative for dysuria and hematuria.   Musculoskeletal: Negative for arthralgias, back pain, joint swelling and neck pain.   Skin: Negative for color change, pallor and wound.   Allergic/Immunologic: Positive for immunocompromised state (Secondary to adjuvant chemotherapy). Negative for environmental allergies and food allergies.   Neurological: Positive for numbness (Mild numbness involving fingertips and toes). Negative for dizziness, weakness and headaches.   Hematological: Negative for adenopathy. Bruises/bleeds easily (At the site of Lovenox injection only the left abdominal wall).   Psychiatric/Behavioral: Negative for agitation, confusion and dysphoric mood (Depression under control with medication). The patient is not nervous/anxious.    03/02/2021 ROS unchanged from above except as updated.             Vitals:    03/02/21 0902   BP: 134/89   Pulse: 113   Resp: 16   Temp: 97.5 °F (36.4 °C)   SpO2: 95%   Weight: 95.3 kg (210 lb)   Height: 166 cm (65.35\")   PainSc: 0-No pain     Current Status 3/2/2021   ECOG score 0         Physical Exam  Vitals reviewed.   Constitutional:       General: She is not in acute distress.     Appearance: Normal appearance. She is well-developed.   HENT:      Head: Normocephalic and atraumatic.      Mouth/Throat:      Pharynx: No oropharyngeal exudate.   Eyes:      Pupils: Pupils are equal, round, and reactive to light.   Cardiovascular:      Rate and Rhythm: " Normal rate and regular rhythm.      Heart sounds: Normal heart sounds. No murmur.   Pulmonary:      Effort: Pulmonary effort is normal. No respiratory distress.      Breath sounds: Normal breath sounds. No wheezing.   Abdominal:      General: Bowel sounds are normal. There is no distension.      Palpations: Abdomen is soft. There is no mass.      Comments: Ostomy in place with liquid brown stool.  Scattered bruises on the abdomen bilaterally from Lovenox injections.   Musculoskeletal:      Cervical back: Neck supple.      Right lower leg: No edema.      Left lower leg: No edema.   Lymphadenopathy:      Cervical: No cervical adenopathy.   Skin:     General: Skin is warm and dry.      Findings: No lesion or rash.   Neurological:      Mental Status: She is alert and oriented to person, place, and time.       RECENT LABS:    Lab Results   Component Value Date    WBC 4.25 03/02/2021    HGB 16.2 (H) 03/02/2021    HCT 47.8 (H) 03/02/2021    MCV 99.2 (H) 03/02/2021     03/02/2021     Lab Results   Component Value Date    NEUTROABS 2.44 03/02/2021     Glucose   Date Value Ref Range Status   03/02/2021 112 74 - 124 mg/dL Final     BUN   Date Value Ref Range Status   03/02/2021 8 6 - 20 mg/dL Final     Creatinine   Date Value Ref Range Status   03/02/2021 0.90 0.60 - 1.10 mg/dL Final   09/09/2020 0.60 0.60 - 1.30 mg/dL Final     Comment:     Serial Number: [id number]:  [id number]     Sodium   Date Value Ref Range Status   03/02/2021 136 134 - 145 mmol/L Final     Potassium   Date Value Ref Range Status   03/02/2021 4.2 3.5 - 4.7 mmol/L Final     Chloride   Date Value Ref Range Status   03/02/2021 102 98 - 107 mmol/L Final     CO2   Date Value Ref Range Status   03/02/2021 23.2 22.0 - 29.0 mmol/L Final     Calcium   Date Value Ref Range Status   03/02/2021 9.5 8.5 - 10.2 mg/dL Final     Total Protein   Date Value Ref Range Status   03/02/2021 7.3 6.3 - 8.0 g/dL Final     Albumin   Date Value Ref Range Status    03/02/2021 3.90 3.50 - 5.20 g/dL Final     ALT (SGPT)   Date Value Ref Range Status   03/02/2021 59 (H) 0 - 33 U/L Final     AST (SGOT)   Date Value Ref Range Status   03/02/2021 29 0 - 32 U/L Final     Alkaline Phosphatase   Date Value Ref Range Status   03/02/2021 121 (H) 38 - 116 U/L Final     Total Bilirubin   Date Value Ref Range Status   03/02/2021 0.3 0.2 - 1.2 mg/dL Final     eGFR Non  Amer   Date Value Ref Range Status   03/02/2021 69 >60 mL/min/1.73 Final     BUN/Creatinine Ratio   Date Value Ref Range Status   03/02/2021 8.9 7.3 - 30.0 Final     Anion Gap   Date Value Ref Range Status   03/02/2021 10.8 5.0 - 15.0 mmol/L Final     Lab Results   Component Value Date    CEA 1.32 09/16/2020       Assessment/Plan      ASSESSMENT:   1.  Rectal cancer, rectal ultrasound examination reported T3N0 disease without lymphadenopathy.   · CT scan of chest, abdomen and pelvis reported no lymphadenopathy in the abdomen, pelvis or chest. She does have small pulmonary nodule 6-7 mm and 2 mm with indeterminate feature. There is no solid evidence of metastatic disease otherwise.   · Based on the CT scan and rectal ultrasound imaging studies, this patient had stage IIA (T3N0M0) disease.    · She had PET scan examination on 8/8/2019 which reported a hypermetabolic right upper lobe pulmonary nodule 6 mm with SUV 5.6.  This is suspicious for metastatic disease however it is too small to be biopsied.  This patient may have stage IV disease.   · She initiated concurrent radiation with continuous 5-FU on 8/12/2019.  · Patient finished concurrent chemoradiation therapy.  · Patient underwent surgical resection of the rectal tumor and diverting loop ileostomy on 12/2/2019 with Dr. Ye.  Pathology evaluation reported residual T3 disease, with 1 out of 14 lymph nodes positive for malignancy.  Certainly this patient has at least stage IIIb rectal cancer (T3 N1 M0?)  · On 1/22/2020 adjuvant chemotherapy FOLFOX 6 regimen  initiated.    · On 2/5/2022 cycle #2 was delayed secondary to neutropenia.  She was given 3 days of Granix.   · Emend added with cycle 3.  With improved nausea control  · Continuing home Granix x3 days following 5-FU disconnect  · 3/11/2020 due for cycle 4, however, she is experiencing progressive abdominal pain and occasional fevers.   · CT scan performed on 3/13/2020 reveals no evidence of progressive or recurrent disease.  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  · Patient hospitalized 4/24-4/26/20 after cycle 5 of chemotherapy with acute UTI.  CT abdomen/pelvis noting fluid collection in the presacral region having diminished in size compared to CT dated 3/13/2020.  Patient was evaluated by Dr. Ye while in the hospital with further plans to evaluate possible colovaginal fistula following completion of chemotherapy.  Patient did respond to IV antibiotics and discharged home on oral cefdinir.  · 4/29/2020 cycle 6 of FOLFOX.  Urinary symptoms have resolved   · Patient seen in the [facility] ED on 5/2/2020 for what was suspected to be an allergic reaction.  She called our service on Saturday explaining that she was experiencing hand and face swelling.  She was instructed to proceed to the emergency department at which time she was assessed and prescribed a Medrol Dosepak.  She had just completed her 5-FU and was unhooked on Friday, 5/1/2020.  Her symptoms have resolved in the office on assessment, 5/5/2020.  · The patient had grade 3 hand-foot syndrome based on symptomology.  Patient had cycle #8 of 5-FU on 5/13/2020. Due to her symptoms and poor tolerance to the 5-FU, her treatment dose will be reduced 50% for oxaliplatin and infusional 5-FU.  Bolus 5-FU will be discontinued..  · On 5/13/2020  We discussed further treatment options pending the scan results.  If she has indeed residual disease or metastatic disease, we will switch her to irinotecan plus Avastin or anti-EGFR monoclonal antibody.  She will need  a further molecular testing of her tumor samples in that case.  · On 5/21/2020 patient had a PET scan and it showed no evidence of of metastatic disease in the neck, chest, abdomen or pelvis.  There was fluid accumulation/abscess.   · On 5/27/2020 I discussed with the patient that we can discontinue chemotherapy at this time.  She will follow-up with Dr. Ye to discuss a possibility to reverse the ileostomy.  We likely will obtain anther PET scan in 3 to 4 months for reassessment.    · Patient was seen by Dr. Ye on 6/19/2019 for evaluation and to discuss possible take down of her ileostomy.  She is scheduled to have a gastrografin enema on 7/2/2020 to evaluate for a fistula, and then a colonoscopy on 7/15/2020, both done by Dr. Ye.  She states that based on the above imaging and procedure findings, she may have a more extensive surgery done or just have her ileostomy reversed, which would likely be done in August 2020.  This will all be coordinated under the care of Dr. Ye.     · I reviewed scan results with the patient on 9/16/2020 for the most recent CT scan from 09/09/2020 and also compared it to her previous PET scan examination from 05/21/2020 and also the original PET scan from 08/09/2019.  The original PET scan there is a small right upper lobe pulmonary nodule 6 mm with SUV 5.6. So in the 05/2020 PET scan the nodule is still there but activity seems much less and this most recent CT scan examination also documented the preexisting small pulmonary nodule however there is a new left lower lobe pulmonary nodule 8 mm in size and I shared with the patient today this nodule is suspicious for metastatic disease. Laboratory studies reported minimal CEA level 1.32 on 09/09/2020. Liver function panel was only marginally abnormal with the ALT 45, the remaining is normal. However laboratory study today showed worsening AST 55, ALT 95 and alkaline phosphatase 143 but is still normal, total bilirubin 0.3.    · So I discussed with the patient on 9/16/2020 recommended to have repeat PET scan examination for assessment because the left lower lobe pulmonary nodule is too small to be biopsied, and if the PET scan reports hypermetabolic lesion, I recommended to have stereotactic radiation therapy. Explained to patient that she is not a really good candidate to have thoracotomy for resection because she still has unhealed wound in the abdomen. I think the stereotactic radiation therapy will be a more feasible choice.  · Laboratory study on 9/16/2020 reported worsening liver function panel with both elevated AST, ALT and also slightly worsened alkaline phosphatase despite having normal total bilirubin. I recommended to repeat laboratory study for reevaluation. The only new medication she has in the past several days is Augmentin but otherwise no change of condition. She denies any recent viral infection. We will monitor this.   · PET scan examination obtained on 9/18/2020 showed metastatic disease.  It reported a few hypermetabolic pulmonary nodules, and some new small pulmonary nodules, all of them highly suspicious for metastatic disease.  She also has hypermetabolic activity in the abdomen and pelvic area which could be related to scar tissue from her recent surgery versus metastatic disease.  Nevertheless overall picture fits with metastatic cancer.  · I discussed with the patient on 9/20/2020, we reviewed the PET scan images together, and I recommended to have systematic chemotherapy, I do not think stereotactic reading therapy to the hypermetabolic lesions in the lungs warranted at this time because even those will be treated with reading therapy, she will still need systematic chemotherapy.  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  I did discuss with the patient using anti-EGFR monoclonal antibody versus anti-VEGF monoclonal antibody.     · Palliative chemotherapy cycle#1  FOLFIRI was started on 10/13/2020.  No bolus 5-FU given considering previous poor tolerance.  Genetic studies still pending.   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  Microsatellite stable, mutation burden 5/Mb.    · Discussed with the patient 10/27/2020, because of the [place] mutation, she is not a candidate for anti-EGFR monoclonal antibody such as Erbitux or vectibix.  She could be a candidate for anti-VEGF monoclonal antibody, however because of active wound, she is not a candidate right now at this moment.  · Patient seen in the ED for acute right neck pain on 11/16/2020.  CT angiogram as well as CT of the cervical spine performed with no acute findings but notably one of her pulmonary nodules, left upper lobe, somewhat decreased in size.  Patient given prednisone pack.  Further details below.  · Patient due today for cycle #4 FOLFIRI.  Plan repeat PET scan after cycle 6.  · Last received cycle #5 chemotherapy without irinotecan on 12/8/2020.   · Patient here 12/29/2020 for evaluation and consideration of cycle 6, but she continues to complain of swelling.  She does have swelling typically after treatment as well.  We will hold treatment for 1 week and reevaluate next week.  Still planning on PET scan following cycle 6.   · Cycle #6 chemotherapy will be resumed on 1/5/2021.  Started back on original irinotecan.  · PET scan examination obtained on 1/12/2021 after cycle #6 chemotherapy reported improved a pulmonary nodule hypermetabolic activity.  Confirmed these are metastatic lesions.  · Patient is evaluated 1/19/2020, will continue cycle #7 FOLFIRI chemotherapy.  However Avastin will be on hold see next section.   · Patient was reevaluated on 2/2/2021, will continue chemotherapy cycle #8 FOLFIRI.  Avastin / biosimilar will be added.    · [facility] mid February 2021, and had recommended palliative chemotherapy without  surgical intervention of metastatic lung lesions.   · 2/16/2021cycle #9 FOLFIRI plus bevacizumab.  Tolerated last cycle well with only some mild increase in liquid stools.  This was easily controlled with antidiarrheals.  · On 3/2/2021, patient will proceed with cycle #10 FOLFIRI plus bevacizumab.  After 12 cycles, plan to start maintenance treatment.    2 .  Pulmonary nodule, hypermetabolic on PET scan.   · Her original PET scan examination from 8/8/2019 reported a small 8 mm right upper apex pulmonary nodule but hypermetabolic SUV 5.1.  That was too small to be biopsied, however there was always a possibility of metastatic disease considering that high activity despite a such a small nodule.  · Her chest CT scan examination from 3/13/2020 reported this shrank to 6 mm.    · PET scan examination on 5/21/2020 reported no metabolic activity at this residual nodule.  This needs to be monitored.    · PET scan examination 9/18/2020 reported couple of hypermetabolic pulmonary nodules, besides a few extra small pulmonary nodules.  Those are highly suspicious for metastatic disease.    · PET scan examination obtained on 1/12/2021 after cycle #6 chemotherapy reported improved a pulmonary nodule hypermetabolic activity.  Confirmed these are metastatic lesions.  · 1/19/2021, patient reports she will see thoracic surgeon tomorrow at the [facility] where she seeks second opinion evaluation, and to see whether she would be a candidate for resection of pulmonary nodules.  I discussed with the patient, she has at least 2 hypermetabolic lesions although they are responded to chemotherapy, I do not think she would be a candidate for surgery.   · Thoracic surgeon from [facility] recommended metastectomy and to discontinue chemotherapy afterwards.   · [facility] physician recommended palliative chemotherapy with no surgical intervention.    3.   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  Low-dose Lovenox discontinued and initiated Xarelto 20 mg daily.  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  · Patient held Xarelto for 2 days prior to MediPort removal from the left chest wall and placement of new port in the right chest wall on 12/18/2020.  She resumed Xarelto that evening.  · Progressive swelling in the neck, face, upper extremities prompting hospitalization with CT scan showing thrombosis in the right subclavian vein and SVC.  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  Although she had been off of Xarelto for a few days, clearly Xarelto was not prohibiting progression of her thrombosis after she resumed treatment and there was some evidence to suggest thrombosis had been present at least in the innominate vein on prior scan in September but now appears chronic.  Current acute thrombosis involving SVC and right subclavian.  · Patient was admitted and placed on heparin drip  · Patient was evaluated by vascular surgery and intervention was not recommended for thrombolysis/thrombectomy.  · On 12/22/2020, the patient developed worsening shortness of breath, increasing upper extremity edema consistent with worsening SVC syndrome.  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  · Symptoms improved and patient was discharged  12/24/2020 on Lovenox 100 mg every 12 hours.    · Returns 12/29/2020 for evaluation continuing Lovenox, overall tolerating it well.  No bleeding issues.  · Improved edema 1/5/2021.  Will continue Lovenox weight-based every 12 hours.  · On 1/19/2021 and 2/2/2021, patient reports improved facial swelling.  She however does have being bruise on her abdomen wall where she does Lovenox injection.  However she does not have a spontaneous epistaxis, gum bleeding or other bleeding.   · On 2/2/2021, we discussed that side effects of Avastin/biosimilar related to thrombosis.  Since this patient already has been on Lovenox, I think the benefits from treatment for her cancer will outweigh that risk of thrombosis, will proceed ahead with Avastin.  I cautioned patient to watch out for signs of worsening thrombosis patient voiced understanding.   · On 3/2/2021, no evidence of worsening DVT, continue Lovenox.    4.  This patient also has strong family history for malignancy the patient had appointment with genetic counseling on September 3, 2019.  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    5.  Mild anemia, improved since her surgery.    · She also has a history of iron deficiency.  Iron studies reveal a saturation of just 10%.  She was started on oral iron but was unable to tolerate due to GI side effects.   · Status post Injectafer 2/5/2020 and 2/12/2020   · Improved hemoglobin 13.5 on 1/5/2021.  · Hemoglobin 14.7 on 2/16/2021.    · Hemoglobin 16.2 on 3/2/2021.  Continue to monitor.      6.  Peripheral neuropathy secondary to chemotherapy.    · This is mild involving bilateral hands and feet as reported on 9/16/2020.   · Will avoid chemotherapy with oxaliplatin.  · Stable mild neuropathy as of 11/10/2020  · Patient reports worsening peripheral neuropathy and cycle #5 chemotherapy on 12/8/2020 with and without irinotecan.   · On 1/5/2021, patient reports improvement of peripheral neuropathy, will add irinotecan back to  chemotherapy.  Continue to monitor and adjust medication.  · On 3/2/2021, patient reports no worsening of peripheral neuropathy after restarting irinotecan    7.  History of hand-foot syndrome grade 3.    · It become so significant after cycle #7 FOLFOX treatment.  Discussed with patient on 5/13/2020, will discontinue the bolus 5-FU dose, and also decrease 50% of the infusion dose through the pump.   · We also use Medrol Dosepak for possible recurrent symptomology.  She responded this well.   · Her hand-foot syndrome improved.  · Under current treatment with FOLFIRI, patient not receiving 5-FU bolus.  No recurrent issues.    8.  Hyperlacrimation and mild scleral irritation related to 5-FU.  She has been taking steroid eyedrops.  This has improved her hyperlacrimation.  · Not currently an issue.  Continue to monitor with 5-FU.      9.  Abnormal liver function panel.  · Improved liver function panel 9/18/2020.  This is probably related to her recent infection.  · She has normalized AST, alk phosphatase, and a much improved only marginally elevated ALT 35 on 10/13/2020.    · Normalized liver function panel on 10/27/2020.    · Normal liver panel on 11/10/2020.    · Normal liver function panel on 12/5/2021.    · Slightly worsening liver function with AST 33 and ALT 56 1/19/2021.    · Improved AST 21 and ALT 39 on 2/2/2021.    · ALT 59 and AST 29 on 3/2/2021.  Continue to monitor.    10.  Intermittent leukocytopenia secondary to chemotherapy.   · WBC currently normal at 5220 and the neutrophil 3520 on 1/5/2021.  Continue to monitor.    · On 2/2/2021, WBC 4110 including ANC 2540.  Continue to monitor for now.  Consider G-CSF if neutropenia becomes an ongoing issue.   · 2/16/2021 WBC 4.6, ANC 2.79.    · On 3/2/2021 WBC 4250 including ANC 2440.  Continue to monitor.    11.  Depression.  Patient seen by JULIET Davenport on 11/9/2020.  She was started on mirtazapine.  Lexapro was discontinued.  This has definitely improved  her mood with the patient feeling overall much better.  She is sleeping better.  Appetite is improved with her actually eating more than she wants to.  She plans to continue follow-up with supportive oncology.    12.  Intermittent upper abdominal discomfort.  This improves with eating.  After further discussion with the patient she also notes 3 nights of increased reflux when laying down.  We discussed her symptoms could be related to increase stomach acid or potential ulcer.  She is currently taking Pepcid 20 mg daily.  I instructed her to add Prilosec 20 mg daily.         PLAN:  1. Proceed with cycle #!0 palliative chemotherapy today with infusional 5-FU, irinotecan, as well as bevacizumab.  2. Continue Lovenox 100 mg every 12 hours.  3. Return in 2 weeks and 4 weeks for follow-up visit with nurse practitioner for reevaluation prior to cycle #11 and second #12 chemotherapy.    4. Arrange PET scan examination after cycle #12 which be about 5 weeks from now.  5. Patient will return in 6 weeks for MD visit to discuss results of PET scan examination and then further treatment plan.  If favorable response, will start maintenance with a 5-FU based treatment plus bevacizumab.    6. Continue antidiarrheals and supportive care as needed.        I reviewed medical records from [facility] [place].    Discussed with the patient about her treatment plan.  She voiced understanding.  Questions were answered to her satisfaction.    Dong Nguyen MD PhD  03/02/21        CC:  WAYNE Worley MD Carrie B. Scharf, MD

## 2021-03-03 RX ORDER — MIRTAZAPINE 15 MG/1
15 TABLET, ORALLY DISINTEGRATING ORAL NIGHTLY
Qty: 90 TABLET | Refills: 0 | OUTPATIENT
Start: 2021-03-03 | End: 2022-03-03

## 2021-03-03 NOTE — TELEPHONE ENCOUNTER
[facility]    Call placed to pt regarding refill request and lost follow up in clinic. Provided pt with number and request for return call.

## 2021-03-04 ENCOUNTER — INFUSION (OUTPATIENT)
Dept: ONCOLOGY | Facility: HOSPITAL | Age: 41
End: 2021-03-04

## 2021-03-04 ENCOUNTER — TELEPHONE (OUTPATIENT)
Dept: SURGERY | Facility: CLINIC | Age: 41
End: 2021-03-04

## 2021-03-04 DIAGNOSIS — C20 ADENOCARCINOMA OF RECTUM (HCC): Primary | ICD-10-CM

## 2021-03-04 DIAGNOSIS — Z45.2 ENCOUNTER FOR FITTING AND ADJUSTMENT OF VASCULAR CATHETER: ICD-10-CM

## 2021-03-04 PROCEDURE — 25010000003 HEPARIN LOCK FLUSH PER 10 UNITS: Performed by: INTERNAL MEDICINE

## 2021-03-04 RX ORDER — HEPARIN SODIUM (PORCINE) LOCK FLUSH IV SOLN 100 UNIT/ML 100 UNIT/ML
500 SOLUTION INTRAVENOUS AS NEEDED
Status: DISCONTINUED | OUTPATIENT
Start: 2021-03-04 | End: 2021-03-04 | Stop reason: HOSPADM

## 2021-03-04 RX ORDER — SODIUM CHLORIDE 0.9 % (FLUSH) 0.9 %
10 SYRINGE (ML) INJECTION AS NEEDED
Status: DISCONTINUED | OUTPATIENT
Start: 2021-03-04 | End: 2021-03-04 | Stop reason: HOSPADM

## 2021-03-04 RX ORDER — SODIUM CHLORIDE 0.9 % (FLUSH) 0.9 %
10 SYRINGE (ML) INJECTION AS NEEDED
Status: CANCELLED | OUTPATIENT
Start: 2021-03-04

## 2021-03-04 RX ORDER — HEPARIN SODIUM (PORCINE) LOCK FLUSH IV SOLN 100 UNIT/ML 100 UNIT/ML
500 SOLUTION INTRAVENOUS AS NEEDED
Status: CANCELLED | OUTPATIENT
Start: 2021-03-04

## 2021-03-04 RX ADMIN — SODIUM CHLORIDE, PRESERVATIVE FREE 10 ML: 5 INJECTION INTRAVENOUS at 10:29

## 2021-03-04 RX ADMIN — Medication 500 UNITS: at 10:29

## 2021-03-04 NOTE — TELEPHONE ENCOUNTER
Last night started having pressure in rectum like she had to have a BM. Hasn't had this sensation since last infection in September, Says when she pushes on rectum or wipes she has a brown, milky drainage. Pt says she is afebrile and has no other signs or symptoms. Says she had a couple of lower pelvic cramps last night. Pt says her output this am is very frothy and liquidy. Says she gets an injection of Lomotil with her chemo. Wants to know if this ok or if she needs to be seen.

## 2021-03-04 NOTE — TELEPHONE ENCOUNTER
Spoke with pt, said if you did not think it was concerning she would wait to be seen. Said she call back if it increases or becomes more bothersome.

## 2021-03-04 NOTE — TELEPHONE ENCOUNTER
Likely mucous or old stool.  Can sit and strain to get some to come out.  Okay for her to increase the Imodium and Lomotil while output is liquidy.  More than happy to see her.

## 2021-03-16 ENCOUNTER — INFUSION (OUTPATIENT)
Dept: ONCOLOGY | Facility: HOSPITAL | Age: 41
End: 2021-03-16

## 2021-03-16 ENCOUNTER — OFFICE VISIT (OUTPATIENT)
Dept: ONCOLOGY | Facility: CLINIC | Age: 41
End: 2021-03-16

## 2021-03-16 VITALS — HEART RATE: 92 BPM | SYSTOLIC BLOOD PRESSURE: 127 MMHG | DIASTOLIC BLOOD PRESSURE: 84 MMHG

## 2021-03-16 VITALS
RESPIRATION RATE: 20 BRPM | BODY MASS INDEX: 33.92 KG/M2 | TEMPERATURE: 97.8 F | HEIGHT: 65 IN | DIASTOLIC BLOOD PRESSURE: 90 MMHG | WEIGHT: 203.6 LBS | OXYGEN SATURATION: 95 % | HEART RATE: 117 BPM | SYSTOLIC BLOOD PRESSURE: 129 MMHG

## 2021-03-16 DIAGNOSIS — C20 ADENOCARCINOMA OF RECTUM (HCC): Primary | ICD-10-CM

## 2021-03-16 DIAGNOSIS — I82.210 THROMBOSIS OF SUPERIOR VENA CAVA (HCC): ICD-10-CM

## 2021-03-16 DIAGNOSIS — C20 ADENOCARCINOMA OF RECTUM (HCC): Primary | Chronic | ICD-10-CM

## 2021-03-16 DIAGNOSIS — C20 ADENOCARCINOMA OF RECTUM (HCC): ICD-10-CM

## 2021-03-16 DIAGNOSIS — F33.9 MONOPOLAR DEPRESSION (HCC): ICD-10-CM

## 2021-03-16 DIAGNOSIS — Z79.899 HIGH RISK MEDICATION USE: ICD-10-CM

## 2021-03-16 LAB
ALBUMIN SERPL-MCNC: 3.9 G/DL (ref 3.5–5.2)
ALBUMIN/GLOB SERPL: 1.1 G/DL (ref 1.1–2.4)
ALP SERPL-CCNC: 114 U/L (ref 38–116)
ALT SERPL W P-5'-P-CCNC: 77 U/L (ref 0–33)
ANION GAP SERPL CALCULATED.3IONS-SCNC: 14 MMOL/L (ref 5–15)
AST SERPL-CCNC: 38 U/L (ref 0–32)
BASOPHILS # BLD AUTO: 0.01 10*3/MM3 (ref 0–0.2)
BASOPHILS NFR BLD AUTO: 0.3 % (ref 0–1.5)
BILIRUB SERPL-MCNC: 0.3 MG/DL (ref 0.2–1.2)
BILIRUB UR QL STRIP: ABNORMAL
BUN SERPL-MCNC: 10 MG/DL (ref 6–20)
BUN/CREAT SERPL: 11.2 (ref 7.3–30)
CALCIUM SPEC-SCNC: 9.5 MG/DL (ref 8.5–10.2)
CHLORIDE SERPL-SCNC: 102 MMOL/L (ref 98–107)
CLARITY UR: CLEAR
CO2 SERPL-SCNC: 21 MMOL/L (ref 22–29)
COLOR UR: YELLOW
CREAT SERPL-MCNC: 0.89 MG/DL (ref 0.6–1.1)
DEPRECATED RDW RBC AUTO: 57.1 FL (ref 37–54)
EOSINOPHIL # BLD AUTO: 0.11 10*3/MM3 (ref 0–0.4)
EOSINOPHIL NFR BLD AUTO: 2.8 % (ref 0.3–6.2)
ERYTHROCYTE [DISTWIDTH] IN BLOOD BY AUTOMATED COUNT: 15.8 % (ref 12.3–15.4)
GFR SERPL CREATININE-BSD FRML MDRD: 70 ML/MIN/1.73
GLOBULIN UR ELPH-MCNC: 3.4 GM/DL (ref 1.8–3.5)
GLUCOSE SERPL-MCNC: 107 MG/DL (ref 74–124)
GLUCOSE UR STRIP-MCNC: NEGATIVE MG/DL
HCT VFR BLD AUTO: 47.6 % (ref 34–46.6)
HGB BLD-MCNC: 16.5 G/DL (ref 12–15.9)
HGB UR QL STRIP.AUTO: ABNORMAL
IMM GRANULOCYTES # BLD AUTO: 0.01 10*3/MM3 (ref 0–0.05)
IMM GRANULOCYTES NFR BLD AUTO: 0.3 % (ref 0–0.5)
KETONES UR QL STRIP: NEGATIVE
LEUKOCYTE ESTERASE UR QL STRIP.AUTO: ABNORMAL
LYMPHOCYTES # BLD AUTO: 1.17 10*3/MM3 (ref 0.7–3.1)
LYMPHOCYTES NFR BLD AUTO: 29.8 % (ref 19.6–45.3)
MCH RBC QN AUTO: 34.3 PG (ref 26.6–33)
MCHC RBC AUTO-ENTMCNC: 34.7 G/DL (ref 31.5–35.7)
MCV RBC AUTO: 99 FL (ref 79–97)
MONOCYTES # BLD AUTO: 0.37 10*3/MM3 (ref 0.1–0.9)
MONOCYTES NFR BLD AUTO: 9.4 % (ref 5–12)
NEUTROPHILS NFR BLD AUTO: 2.26 10*3/MM3 (ref 1.7–7)
NEUTROPHILS NFR BLD AUTO: 57.4 % (ref 42.7–76)
NITRITE UR QL STRIP: NEGATIVE
NRBC BLD AUTO-RTO: 0 /100 WBC (ref 0–0.2)
PH UR STRIP.AUTO: 6 [PH] (ref 4.5–8)
PLATELET # BLD AUTO: 205 10*3/MM3 (ref 140–450)
PMV BLD AUTO: 9.1 FL (ref 6–12)
POTASSIUM SERPL-SCNC: 4.1 MMOL/L (ref 3.5–4.7)
PROT SERPL-MCNC: 7.3 G/DL (ref 6.3–8)
PROT UR QL STRIP: ABNORMAL
RBC # BLD AUTO: 4.81 10*6/MM3 (ref 3.77–5.28)
SODIUM SERPL-SCNC: 137 MMOL/L (ref 134–145)
SP GR UR STRIP: >=1.03 (ref 1–1.03)
UROBILINOGEN UR QL STRIP: ABNORMAL
WBC # BLD AUTO: 3.93 10*3/MM3 (ref 3.4–10.8)

## 2021-03-16 PROCEDURE — 85025 COMPLETE CBC W/AUTO DIFF WBC: CPT

## 2021-03-16 PROCEDURE — 25010000002 PALONOSETRON PER 25 MCG: Performed by: NURSE PRACTITIONER

## 2021-03-16 PROCEDURE — 96413 CHEMO IV INFUSION 1 HR: CPT

## 2021-03-16 PROCEDURE — 25010000002 ATROPINE PER 0.01 MG: Performed by: NURSE PRACTITIONER

## 2021-03-16 PROCEDURE — 96368 THER/DIAG CONCURRENT INF: CPT

## 2021-03-16 PROCEDURE — 96367 TX/PROPH/DG ADDL SEQ IV INF: CPT

## 2021-03-16 PROCEDURE — 25010000002 BEVACIZUMAB PER 10 MG: Performed by: NURSE PRACTITIONER

## 2021-03-16 PROCEDURE — 25010000002 FLUOROURACIL PER 500 MG: Performed by: NURSE PRACTITIONER

## 2021-03-16 PROCEDURE — 25010000002 LEUCOVORIN CALCIUM PER 50 MG: Performed by: NURSE PRACTITIONER

## 2021-03-16 PROCEDURE — 96417 CHEMO IV INFUS EACH ADDL SEQ: CPT

## 2021-03-16 PROCEDURE — 96416 CHEMO PROLONG INFUSE W/PUMP: CPT

## 2021-03-16 PROCEDURE — 99214 OFFICE O/P EST MOD 30 MIN: CPT | Performed by: NURSE PRACTITIONER

## 2021-03-16 PROCEDURE — 96375 TX/PRO/DX INJ NEW DRUG ADDON: CPT

## 2021-03-16 PROCEDURE — 25010000002 IRINOTECAN PER 20 MG: Performed by: NURSE PRACTITIONER

## 2021-03-16 PROCEDURE — 96415 CHEMO IV INFUSION ADDL HR: CPT

## 2021-03-16 PROCEDURE — 25010000002 DEXAMETHASONE SODIUM PHOSPHATE 100 MG/10ML SOLUTION: Performed by: NURSE PRACTITIONER

## 2021-03-16 PROCEDURE — 25010000002 FOSAPREPITANT PER 1 MG: Performed by: NURSE PRACTITIONER

## 2021-03-16 PROCEDURE — 81003 URINALYSIS AUTO W/O SCOPE: CPT

## 2021-03-16 PROCEDURE — 25010000002 BEVACIZUMAB 100 MG/4ML SOLUTION 4 ML VIAL: Performed by: NURSE PRACTITIONER

## 2021-03-16 PROCEDURE — 80053 COMPREHEN METABOLIC PANEL: CPT

## 2021-03-16 RX ORDER — PALONOSETRON 0.05 MG/ML
0.25 INJECTION, SOLUTION INTRAVENOUS ONCE
Status: CANCELLED | OUTPATIENT
Start: 2021-03-16

## 2021-03-16 RX ORDER — ATROPINE SULFATE 1 MG/ML
0.25 INJECTION, SOLUTION INTRAMUSCULAR; INTRAVENOUS; SUBCUTANEOUS
Status: CANCELLED | OUTPATIENT
Start: 2021-03-16

## 2021-03-16 RX ORDER — PALONOSETRON 0.05 MG/ML
0.25 INJECTION, SOLUTION INTRAVENOUS ONCE
Status: COMPLETED | OUTPATIENT
Start: 2021-03-16 | End: 2021-03-16

## 2021-03-16 RX ORDER — SODIUM CHLORIDE 9 MG/ML
250 INJECTION, SOLUTION INTRAVENOUS ONCE
Status: CANCELLED | OUTPATIENT
Start: 2021-03-16

## 2021-03-16 RX ORDER — ESCITALOPRAM OXALATE 10 MG/1
TABLET ORAL
Qty: 30 TABLET | Refills: 5 | Status: SHIPPED | OUTPATIENT
Start: 2021-03-16 | End: 2021-08-23

## 2021-03-16 RX ORDER — ATROPINE SULFATE 0.4 MG/ML
0.25 AMPUL (ML) INJECTION
Status: DISCONTINUED | OUTPATIENT
Start: 2021-03-16 | End: 2021-03-16 | Stop reason: HOSPADM

## 2021-03-16 RX ORDER — SODIUM CHLORIDE 9 MG/ML
250 INJECTION, SOLUTION INTRAVENOUS ONCE
Status: COMPLETED | OUTPATIENT
Start: 2021-03-16 | End: 2021-03-16

## 2021-03-16 RX ADMIN — BEVACIZUMAB 950 MG: 400 INJECTION, SOLUTION INTRAVENOUS at 11:36

## 2021-03-16 RX ADMIN — SODIUM CHLORIDE 100 ML: 9 INJECTION, SOLUTION INTRAVENOUS at 11:02

## 2021-03-16 RX ADMIN — SODIUM CHLORIDE 250 ML: 9 INJECTION, SOLUTION INTRAVENOUS at 10:41

## 2021-03-16 RX ADMIN — FLUOROURACIL 4850 MG: 50 INJECTION, SOLUTION INTRAVENOUS at 13:50

## 2021-03-16 RX ADMIN — ATROPINE SULFATE 0.25 MG: 0.4 INJECTION, SOLUTION INTRAMUSCULAR; INTRAVENOUS; SUBCUTANEOUS at 12:06

## 2021-03-16 RX ADMIN — DEXTROSE MONOHYDRATE 365 MG: 50 INJECTION, SOLUTION INTRAVENOUS at 12:10

## 2021-03-16 RX ADMIN — SODIUM CHLORIDE 810 MG: 900 INJECTION, SOLUTION INTRAVENOUS at 12:09

## 2021-03-16 RX ADMIN — PALONOSETRON 0.25 MG: 0.05 INJECTION, SOLUTION INTRAVENOUS at 10:41

## 2021-03-16 RX ADMIN — DEXAMETHASONE SODIUM PHOSPHATE 12 MG: 10 INJECTION, SOLUTION INTRAMUSCULAR; INTRAVENOUS at 10:42

## 2021-03-16 NOTE — PROGRESS NOTES
REASON FOR FOLLOW UP:     1. Rectal cancer, rectal ultrasound examination in July 2019 reported T3N0 disease without lymphadenopathy. She does have small pulmonary nodule 6-7 mm and 2 mm with indeterminate feature. There is no solid evidence of metastatic disease otherwise. Patient has stage IIA (T3N0M0) disease.  2. The patient is heterozygous factor V Leiden, was on prophylactic anticoagulation with Lovenox 40 mg daily given her increased risk of thrombosis with MediPort and GI malignancy.   3. PET scan on 8/8/2019 reported an 8 mm hypermetabolic right upper lobe pulmonary nodule, which is suspicious for metastatic as well.    4. Patient had a PowerPort placement on the left upper chest by Dr. Joseph on 8/9/2019.  5. Patient was started on concurrent infusional 5-FU chemoradiation therapy on 8/12/2019, with planned complete surgical resection and further adjuvant chemotherapy with intention to cure the disease.   6. Patient underwent surgical resection of the primary rectal cancer by Dr. Ye on 12/2/2019.  Pathology evaluation reported residual T3N1 disease stage IIIb.  7. Diarrhea related to therapy and radiation.   8. Patient was started cycle 1 day 1 of adjuvant FOLFOX 6 on 1/23/2020.  9. On 2/5/2020 FOLFOX 6 cycle 2 delayed secondary to neutropenia.  Patient was given 3 days of Granix injection.  After cycle #2 we planned 3 days of Granix with each cycle.   10. Patient also intolerant of oral iron.  Patient received 2 doses of IV injectafer on 02/05/2020 and 02/12/2020.   11. 02/12/2020 Proceed with cycle #2 of FOLFOX 6 with 3 days of Granix.  12. On 3/11/2020 cycle 4 postponed secondary to abdominal pain and occasional low-grade fevers.  CT scans ordered.  13. Cycle #4 resumed after CT scan revealed no evidence of disease.  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 Ye. patient has no fever.  Continue to observe.   14. Grade 3  hand-foot syndrome secondary to 5-FU after cycle #7 FOLFOX 6 chemotherapy, prompting ER visit.  Also has worsening peripheral neuropathy.   15. Cycle #8 FOLFOX 6 was given on 5/13/2020, with 50% dose reduction for both 5-FU and oxaliplatin, and also examination of bolus 5-FU.   16. PET scan examination on 5/21/2020 reported no evidence of metastatic disease in the chest abdomen pelvis.  Stable 6 mm RUL pulmonary nodule.  17. On 5/27/2020, I discussed with the patient that we can discontinue chemotherapy at this time.   18. Patient had a surgical procedure for low anterior colon resection, coloanal anastomosis on 8/3/2020.  19. CT scan for chest abdomen pelvis on 9/9/2020 reported a new 8 mm noncalcified pulmonary nodule in the left lower lobe of the lung.  Otherwise stable right upper lobe 6 mm pulmonary nodule, and no evidence of disease recurrence in the abdomen or pelvis.  20. PET scan examination on 9/18/2020 reported multiple hypermetabolic small pulmonary nodules/ new pulmonary nodules.   21. Patient was started on pelvic chemotherapy with FOLFIRI regimen on 10/13/2020.   22. 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 It reported tumor mutation burden 5 Muts/Mb, microsatellite stable, TP53 mutation R282W, and others.   23. Cycle #5 was without irinotecan, due to peripheral neuropathy.  24. 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  Patient was switched to weight-based Lovenox injection.  25. Cycle #6 5-FU and irinotecan was delayed by 2 weeks because of the above incident.  26. Patient had a telemedicine evaluation at that the [facility] [place] in February 2021.  They agreed with our treatment plan for palliative chemotherapy followed by maintenance chemotherapy.      HISTORY OF PRESENT ILLNESS:  The patient is a 40 y.o. female name imaging history who is  here today for lab review and evaluation due for her 11th cycle of FOLFIRI and bevacizumab.  She reports a few episodes last week of some discomfort in her esophagus when she ate.  She increased her Pepcid to twice daily, and is to resolved her symptoms.    Continues on Lovenox injections every 12 hours and denies bleeding issues.      Past Medical History:   Diagnosis Date   • Abdominopelvic abscess (CMS/HCC) 08/12/2020    ADMITTED TO [facility]   • Abnormal Pap smear of cervix 02/02/1998    JULIUS I   • Anemia in pregnancy    • Anxiety    • Bilateral epiphora 04/2020   • Bilateral hand swelling 05/02/2020    SEEN AT [facility] ER   • Cervical lymphadenitis 06/06/2012    SEEN AT [facility] ER   • Chemotherapy induced neutropenia (CMS/[facility]) 04/2020   • Chemotherapy-induced nausea 02/2020   • Chemotherapy-induced thrombocytopenia 05/2020   • Chronic diarrhea    • Colon polyps     FOLLOWED BY DR. GERONIMO ESPARZA   • Cystitis 04/24/2020    WITH DEHYDRATION, ADMITTED TO [facility]   • Cystitis 10/27/2020   • Drug-induced peripheral neuropathy (CMS/[facility]) 05/2020   • Fistula of intestine    • GERD (gastroesophageal reflux disease)    • Hand foot syndrome 05/2020   • Heart murmur     IN CHILDHOOD   • Hematochezia    • Hemorrhoids    • Heterozygous factor V Leiden mutation (CMS/[facility])     DX 8-2-2019   • History of anemia    • History of chemotherapy 2019    FOLLOWED BY DR. ALEXANDRU HUNT   • History of gestational diabetes    • History of pre-eclampsia    • History of radiation therapy 2019    FOLLOWED BY DR. JAVON LEWIS   • History of TB skin testing 01/17/2009    TB Skin Test   • HPV in female 1998   • Hypokalemia 09/2019   • Hypomagnesemia 09/2019   • IBS (irritable bowel syndrome)    • Ileostomy in place (CMS/[facility])     FOLLOWED BY DR. GERONIMO ESPARZA   • Infected insect bite of neck 05/27/2016    SEEN AT [facility]   • Kidney stones 08/09/2007    SEEN AT [facility] ER   • Lump of right breast     SWOLLEN LYMPH NODE   • Lung cancer (CMS/[facility]) 09/28/2020    METASTATIC  LUNG CANCER   • Monopolar depression (CMS/HCC)    • On anticoagulant therapy    • Perirectal abscess 2020   • PIH (pregnancy induced hypertension)    • Pulmonary embolism without acute cor pulmonale (CMS/HCC) 09/20/2019    X 3; ADMITTED TO [facility]   • Pulmonary nodule, right 2020   • Rectal cancer (CMS/HCC) 2019    STAGE IIA, INVASIVE MODERATELY DIFFERENTIATED ADENOCARCINOMA, CHEMO AND XRT FINISHED 2019   • Right shoulder pain 2020    SEEN AT [facility] ER   • Right ureteral stone 10/01/2019    SEEN AT [facility] ER   • SAB (spontaneous ) 1996     A2-1 INDUCED   • Sciatica    • Sepsis due to cellulitis (CMS/HCC) 2002    LEFT GREAT TOE, ADMITTED TO [facility]   • Tachycardia 2020   • Urinary urgency 2020     Past Surgical History:   Procedure Laterality Date   • CHOLECYSTECTOMY N/A 10/10/2003    LAPAROSCOPIC WITH CHOLANGIOGRAM, DR. JAMEY TALAVERA AT [facility]   • COLON RESECTION N/A 2019    Procedure: laparoscopic low anterior colon resection with mobilization of splenic flexure and diverting loop ileostomy: ERAS;  Surgeon: Padmaja Esparza MD;  Location: [facility];  Service: General   • COLON RESECTION N/A 8/3/2020    Procedure: LOW ANTERIOR COLON RESECTION, COLOANAL ANASTOMOSIS, MOBILIZATION SPLENIC FLEXURE;  Surgeon: Padmaja Esparza MD;  Location: [facility] OR;  Service: General;  Laterality: N/A;   • COLONOSCOPY N/A 7/15/2020    PATENT ANASTAMOSIS IN MID RECTUM, RESCOPE IN 1 YR, DR. PADMAJA ESPARZA AT [facility]   • COLONOSCOPY W/ POLYPECTOMY N/A 2019    15 MM TUBULOVILLOUS ADENOMA POLYP IN HEPATIC FLEXURE, 20 MMTUBULOVILLOUS ADENOMA WITH HIGH GRADE DYSPLASIA IN RECTOSIGMOID, 4 CM MASS IN MID RECTUM, PATH: INVASIVE MODERATELY DIFFERENTIATED ADENOCARCINOMA, DR. JENNIFER LI AT [facility]   • DILATATION AND EVACUATION N/A    • ENDOSCOPY N/A 2019    LA GRADE A ESOPHAGITIS, GASTRITIS, ALL BIOPSIES BENIGN, DR. JENNIFER LI AT [facility]   •  INCISION AND DRAINAGE PERIRECTAL ABSCESS N/A 8/14/2020    Procedure: INCISION AND DRAINAGE OF retrorectal dehiscence abcess with drain placement and irrigation;  Surgeon: Geronimo Ye MD;  Location: [facility] OR;  Service: General;  Laterality: N/A;   • PAP SMEAR N/A 01/24/2014   • SIGMOIDOSCOPY N/A 7/24/2019    INFILTRATIVE PARTIALLY OBSTRUCTING LARGE RECTAL CANCER, AREA TATOOED, DR. GERONIMO YE AT [facility]   • SIGMOIDOSCOPY N/A 11/23/2019    AN ULCERATED PARTIALLY OBSTRUCTING MASS IN MID RECTUM, AREA TATTOOED, DR. GERONIMO YE AT [facility]   • TRANSRECTAL ULTRASOUND N/A 7/24/2019    Procedure: ULTRASOUND TRANSRECTAL;  Surgeon: Geronimo Ye MD;  Location: [facility] ENDOSCOPY;  Service: General   • URETEROSCOPY LASER LITHOTRIPSY WITH STENT INSERTION Right 10/30/2019    DR. ESTUARDO BEASLEY AT [place]   • VAGINAL DELIVERY N/A 09/18/1998   • VENOUS ACCESS DEVICE (PORT) INSERTION Left 8/9/2019    Procedure: INSERTION VENOUS ACCESS DEVICE;  Surgeon: Sj Joseph MD;  Location: [facility] OR [facility];  Service: General   • VENOUS ACCESS DEVICE (PORT) INSERTION N/A 12/18/2020    Procedure: INSERTION VENOUS ACCESS DEVICE right side, removal venous access device left side;  Surgeon: Sj Joseph MD;  Location: [facility] OR [facility];  Service: General;  Laterality: N/A;   • WISDOM TOOTH EXTRACTION Bilateral 1993       HEMATOLOGIC/ONCOLOGIC HISTORY:      The patient reports she has intermittent rectal bleeding and urgency, mucous with her stool, starting sometime in 2016. At that time she was referred to GI service, and was diagnosed of irritable bowel syndrome. Nevertheless she had worsening urgency for bowel movement, and had a couple of incidents losing control of stool. She was recently seen by Roland Thorpe MD again and had colonoscopy and EGD exam on 07/08/2019. She was found having a circumferential rectal mass. According to the procedure note, the patient had a fungating circumferential bleeding 4 cm mass in the middle rectum, at  distance between 13 cm and 17 cm from the anus. Mass was causing partial obstruction. There were also colon polyps found at the hepatic flexure and also at the rectosigmoid junction 23 cm from the anus. Both were resected and retrieved. EGD examination reported grade A esophagitis at the GE junction and patchy discontinuous erythema and aggravation of the mucosa without bleeding in the stomach body. There is normal mucosa of the duodenum. Pathology evaluation from this procedure reported moderately differentiated adenocarcinoma involving the rectal mass. The rectal sigmoid junction polyp was tubular/tubulovillous adenoma with high grade dysplasia negative for invasive malignancy. The hepatic flexure polyp was also tubular/tubulovillous adenoma negative for high grade dysplasia or malignancy. The biopsy from the esophagus reports squamocolumnar mucosa with inflammatory changes suggestive of mild reflux esophagitis, negative for interstitial metaplasia. Gastric biopsy was benign and duodenal biopsy was also benign. There is no mention of H-pylori.     Patient was subsequently referred to colorectal surgeon Padmaja Ye MD for further evaluation. The patient had CT scan examination for chest, abdomen and pelvis requested by Dr. Ye and were done on 07/13/2019. The study reported no evidence of lymphadenopathy in the abdomen and pelvis. There was wall thickening of the rectosigmoid junction. Normal spleen, pancreas, adrenal glands and kidneys. There was fatty liver infiltration but no focal lymphatic lesions. There was a small 6-7 mm noncalcified nodule in the right upper lobe and a tiny 3 mm subpleural nodule in the right middle lobe. No mediastinal or hilar lymphadenopathy.     Dr. Ye performed staging rectal ultrasound examination. This study reported tumor penetrating through the muscularis propria as T3 disease; there were no lymph nodes identified.    She had a hospitalization in late September 2019 because  of newly discovered pulmonary emboli.  She was on prophylactic Lovenox prior to the incident of PE.  Now she is on full dose Xarelto anticoagulation.  Patient reports no further chest pain dyspnea.  She denies bleeding or bruising.  During her hospitalization, she was seen by Dr. Ye, who plans to have surgery 8 to 12 weeks after finishing radiation therapy.  She finished her radiation on 9/19/2019.     I noticed patient also presented to the emergency room on 10/1/2019 complaining of right flank area pain.  I reviewed the images studies and indeed she has a very small 1 to 2 mm obstructing kidney stone in the UV junction.  Patient is still symptomatic with some pains and dysuria.  She denies fever sweating or chills.    Laboratory study on 10/7/2019 reported normal CBC including hemoglobin 13.1, platelets 301,000, WBC 6170 and ANC 4900 lymphocytes 590 monocytes 480.      The nurse reported malfunction of port-a-catheter on 10/7/2019.  Port study on 10/8/2019 showed fibrin sheath around the distal aspect of the Mediport catheter in the SVC. This does not appear to hinder injection, but does prevent aspiration at this time.    Patient underwent surgical resection of the colon on 12/2/2019 with Dr. Ye.  Pathology evaluation reported residual T3 disease, also 1 out of 14 lymph nodes positive for malignancy.  So this patient in either had at least stage IIIb disease (T3 N1 M0?).      Adjuvant chemotherapy FOLFOX 6 will be started on 1/22/2020.  Laboratory study reported iron saturation 10%, free iron 31 TIBC 319 and ferritin 168.  Her hemoglobin was 11.8, WBC 5600, and platelets 347,000.    Patient was here on 02/12/2020 for cycle 2 of her FOLFOX.  This is delayed x1 week secondary to neutropenia.  The patient ultimately received 3 days worth of Neupogen with recovery of her blood counts.  Of note, the patient struggled with significant nausea without any episodes of vomiting following cycle #1 of chemotherapy  resulting in significant weight loss.  She is up 12 pounds over the last week as her appetite has normalized.  We will add Emend to her care plan.    She is having several loose stools per her colostomy and has been hesitant to take Imodium due to prior history of constipation.  I encouraged her to try this with a maximum of 8 tablets/day.  She denies any infectious symptoms including fevers or chills.  No excess bleeding or bruising noted.  She had the expected cold sensitivity related to the Oxaliplatin for about 3 days following treatment.    Labs from 02/12/2020 demonstrated total white blood cell count of 5.14, ANC of 3.06, hemoglobin of 11.2, platelets of 211,000.  She was found to be iron deficient last week and is intolerant to oral iron secondary to GI distress.  For this reason, she initiated IV iron therapy with Injectafer last week.  She had received her second dose 02/12/2020    Patient has normalized hemoglobin 12.2 on 2/26/2020.    She reported on 5/5/2020 she had a recent visit to the emergency department for what was suspected to be an allergic reaction.  She called our on-call service on Saturday with reports of hand and face swelling.  She was instructed to proceed to the emergency department at which time she was assessed and prescribed a Medrol Dosepak.  She had just completed her 5-FU and was unhooked on Friday, 5/3/2020.  Her symptoms have improved.  She does report persistent hyperpigmentation and mild swelling of the palms of the hands but this is much improved.  It was her right hand specifically that was swollen.  Her facial swelling has resolved.  She continues on Cefdinir nd since has 1 day remaining, she was prescribed cefdinir for a UTI requiring hospitalization at the end of April.  Reports no new symptoms.  Her labs are stable.  She is scheduled for treatment again.    Patient states at this time she is not tolerating her chemotherapy well.     Patient seen in the emergency  department on 5/2/2020 for what was suspected to be an allergic reaction.  She called our on-call service on Saturday explaining that she was experiencing hand and face swelling.  She was instructed to proceed to the emergency department at which time she was assessed and prescribed a Medrol Dosepak.  She had just completed her 5-FU and was unhooked on Friday, 5/1/2020.      She reports since her ED visit on 5/2/2020 her symptoms have not improved. Her hands and feet were swollen upon presenting to the ED and she could barely make a fist. She states that she feels so swollen she is not able to stand it. She states that her skin on the hands and feet are peeling extensively as well, besides changing color to more dark.     She also reports significant fatigue after her first week of the 5-FU treatment but she expected this side effect. She also notices significant increase in her neuropathy to the point that she is not able to even walk around in her home without her house slippers due to irritation from her rugs. She denies and associated nausea or vomiting at this time. She also has episodes of epistaxis every day after the chemotherapy cycle #7.  She does report working full-time during the week of chemotherapy.    Laboratory studies, 5/13/2020, show moderate thrombocytopenia platelets 123,000, low normal WBC 4140 including ANC 2720 and normal hemoglobin 13.4.  Because significant hand-foot syndrome, will decrease both 5-FU and oxaliplatin by 50%, and eliminate bolus dose of 5-FU.    On 5/21/2020 patient had a PET scan performed which showed no convincing evidence of residual disease in abdomen or pelvis, no metastatic disease within the chest or neck.     Cycle #8 FOLFOX 6 was given on 5/13/2020, with 50% dose reduction for both 5-FU and oxaliplatin, and also examination of bolus 5-FU.  She states on 5/27/2020 that with the recent reduction of the chemotherapy she feels significantly better. She has more energy  and is able to do her daily routine.      PET scan examination on 5/21/2020 reported no evidence of metastatic disease in chest abdomen pelvis.  There was a stable 6 mm right upper lobe pulmonary nodule.    Laboratory studies on 5/27/2020 showed mild leukocytopenia WBC 3720 but a normal ANC 2250 and lymphocytes 630.  Normal platelets 163,000 and hemoglobin 12.6.  Chemistry lab reported normal renal function, liver function panel and a electrolytes, elevated glucose 164.    Laboratory studies 6/24/2020, showed normal hemoglobin 13.4 but macrocytosis .9.  Normal platelets 210,000 and WBC 5870.  She had normal CMP.     Patient last time was here in late June 2020.  Since that time she had surgical procedure for low anterior colon resection, coloanal anastomosis on 8/3/2020.  She later developed a perirectal abscess, required surgical drainage on 8/14/2020.  She was discharged on 8/27/2020.    Patient reports to me she still has lower abdominal wall vacuum suction in place.  She denies fever sweating chills.  Performance status is ECOG 1.  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  She does have visiting nurse come to the home for wound care.    CT scan for chest abdomen pelvis on 9/9/2020 reported a new 8 mm noncalcified pulmonary nodule in the left lower lobe.  Otherwise stable right upper lobe 6 mm pulmonary nodule, and no evidence of disease recurrence in the abdomen or pelvis.     Laboratory study on 9/16/2020 reported elevated AST 55, ALT 95, alk phosphatase 143 but normal total bilirubin 0.3.  Chemistry lab otherwise unremarkable.  She has completed normal CBC.      Because of the abnormal CT scan examination for chest abdomen pelvis on 9/9/2020 reported a new 8 mm noncalcified pulmonary nodule in the left lower lobe, we obtained a PET scan examination for further evaluation, which was done on 9/18/2020.  This study reported several pulmonary nodules, some of them are  hypermetabolic, newly developed compared to previous PET scan in May 2020.  Certainly does highly suspicious for metastatic disease.  There are also hypermetabolic activity in the abdomen and pelvic area which is hard to differentiate from surgical scar versus metastatic malignancy.    Laboratory study on 10/13/2020 reported normal CBC with Hb 13.9, platelets 302,000 and WBC 5520 including ANC 3830.  Chemistry lab reported normalized AST 20, alk phosphatase 116 improved ALT 35, and maintains normal bilirubin, with normal electrolytes and liver function panel.     Patient was started on first cycle of palliative chemotherapy FOLFIRI on 10/13/2020.    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  Patient had a new port placed 12/18/2020, and had held her Xarelto for 2 days prior to surgery.  She presented to the ER on 12/21/20 with complaints of facial and arm swelling for 3 days.  She also noted increased shortness of breath.  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  Thrombus within the right internal jugular vein cannot be excluded.  Patient was started on IV heparin, and eventually transitioned to weight-based Lovenox, which she now continues.      MEDICATIONS    Current Outpatient Medications:   •  clonazePAM (KlonoPIN) 1 MG tablet, TAKE 1 TABLET BY MOUTH THREE TIMES A DAY AS NEEDED FOR ANXIETY OR SEIZURES, Disp: 90 tablet, Rfl: 0  •  Diclofenac Sodium (VOLTAREN) 1 % gel gel, Apply 4 g topically to the appropriate area as directed 3 (Three) Times a Day., Disp: 150 g, Rfl: 3  •  dicyclomine (BENTYL) 20 MG tablet, TAKE 1 TABLET BY MOUTH EVERY 6 HOURS AS NEEDED, Disp: 360 tablet, Rfl: 0  •  diphenoxylate-atropine (LOMOTIL) 2.5-0.025 MG per tablet, TAKE 1 TABLET BY MOUTH 4 (FOUR) TIMES A DAY AS NEEDED FOR DIARRHEA., Disp: 120  tablet, Rfl: 1  •  enoxaparin (Lovenox) 100 MG/ML solution syringe, Inject 1 mL under the skin into the appropriate area as directed Every 12 (Twelve) Hours., Disp: 60 mL, Rfl: 2  •  escitalopram (LEXAPRO) 10 MG tablet, TAKE 1 TABLET BY MOUTH EVERY DAY, Disp: 30 tablet, Rfl: 5  •  famotidine (PEPCID) 20 MG tablet, TAKE 1 TABLET BY MOUTH TWICE A DAY (Patient taking differently: Take 20 mg by mouth 2 (Two) Times a Day.), Disp: 180 tablet, Rfl: 1  •  HYDROcodone-acetaminophen (NORCO) 7.5-325 MG per tablet, Take 1 tablet by mouth Every 6 (Six) Hours As Needed for Moderate Pain ., Disp: 240 tablet, Rfl: 0  •  Loratadine (CLARITIN) 10 MG capsule, Take 10 mg by mouth Every Evening., Disp: , Rfl:   •  ondansetron (ZOFRAN) 8 MG tablet, Take 1 tablet by mouth 3 (Three) Times a Day As Needed for Nausea or Vomiting., Disp: 60 tablet, Rfl: 5  •  prochlorperazine (COMPAZINE) 10 MG tablet, Take 1 tablet by mouth Every 6 (Six) Hours As Needed for Nausea or Vomiting., Disp: 30 tablet, Rfl: 2  No current facility-administered medications for this visit.    Facility-Administered Medications Ordered in Other Visits:   •  atropine injection 0.25 mg, 0.25 mg, Intravenous, Q15 Min PRN, Sheba Matias APRN  •  bevacizumab (AVASTIN) 950 mg in sodium chloride 0.9 % 138 mL chemo IVPB, 10 mg/kg (Treatment Plan Recorded), Intravenous, Once, Sheba Matias APRN  •  fluorouracil (ADRUCIL) 4,850 mg, sodium chloride 0.9 % 143 mL 240 mL chemo infusion - FOR HOME USE, 2,400 mg/m2 (Treatment Plan Recorded), Intravenous, Once, Sheba Matias APRN  •  FOSAPREPITANT 150 MG/100ML NORMAL SALINE (CBC) IVPB 100 mL 100 mL, 150 mg, Intravenous, Once, Sheba Matias APRN  •  irinotecan (CAMPTOSAR) 365 mg in dextrose (D5W) 5 % 518.3 mL chemo IVPB, 180 mg/m2 (Treatment Plan Recorded), Intravenous, Once, Sheba Matias APRN  •  leucovorin 810 mg in sodium chloride 0.9 % 290.5 mL IVPB, 400 mg/m2 (Treatment Plan Recorded), Intravenous,  Tucker, Sheba Matias APRN    ALLERGIES:   No Known Allergies    SOCIAL HISTORY:       Social History     Tobacco Use   • Smoking status: Current Some Day Smoker     Packs/day: 1.00     Years: 24.00     Pack years: 24.00     Types: Electronic Cigarette, Cigarettes   • Smokeless tobacco: Never Used   • Tobacco comment: LAST CIGARETTE 8/12/2020   Vaping Use   • Vaping Use: Former   • Substances: Nicotine   • Devices: Disposable   Substance Use Topics   • Alcohol use: Not Currently   • Drug use: No         FAMILY HISTORY:   Mother has positive factor V Leiden mutation, although she did not have thrombosis, mother also is coronary disease with stenting, she also is occasional bruising.  Maternal grandmother had DVT, she had multiple surgical procedures.  Patient mother's half-brother had metastatic colon cancer at diagnosis in his 50s.  Maternal great aunt has breast cancer.  Patient will follow his skin cancer in his 60s, exclusive.    REVIEW OF SYSTEMS:  Review of Systems   Constitutional: Negative for activity change, appetite change, chills, fatigue, fever and unexpected weight change.   HENT: Negative for hearing loss, mouth sores, nosebleeds and trouble swallowing.    Eyes: Negative for photophobia and visual disturbance.   Respiratory: Negative for cough, chest tightness and shortness of breath.    Cardiovascular: Negative for chest pain, palpitations and leg swelling.   Gastrointestinal: Positive for diarrhea (ostomy). Negative for abdominal distention, abdominal pain, anal bleeding, constipation and nausea.        See HPI   Endocrine: Negative for cold intolerance and heat intolerance.   Genitourinary: Negative for dysuria and hematuria.   Musculoskeletal: Negative for arthralgias, back pain, joint swelling and neck pain.   Skin: Negative for color change, pallor and wound.   Allergic/Immunologic: Positive for immunocompromised state (Secondary to adjuvant chemotherapy). Negative for environmental  "allergies and food allergies.   Neurological: Positive for numbness (Mild numbness involving fingertips and toes). Negative for dizziness, weakness and headaches.   Hematological: Negative for adenopathy. Bruises/bleeds easily (At the site of Lovenox injection only the left abdominal wall).   Psychiatric/Behavioral: Negative for agitation, confusion and dysphoric mood (Depression under control with medication). The patient is not nervous/anxious.    03/16/2021 ROS unchanged from above except as updated.             Vitals:    03/16/21 0956   BP: 129/90   Pulse: 117   Resp: 20   Temp: 97.8 °F (36.6 °C)   TempSrc: Temporal   SpO2: 95%   Weight: 92.4 kg (203 lb 9.6 oz)   Height: 166 cm (65.35\")   PainSc: 0-No pain     Current Status 3/16/2021   ECOG score 0         Physical Exam  Vitals reviewed.   Constitutional:       General: She is not in acute distress.     Appearance: Normal appearance. She is well-developed.   HENT:      Head: Normocephalic and atraumatic.      Mouth/Throat:      Pharynx: No oropharyngeal exudate.   Eyes:      Pupils: Pupils are equal, round, and reactive to light.   Cardiovascular:      Rate and Rhythm: Normal rate and regular rhythm.      Heart sounds: Normal heart sounds. No murmur.   Pulmonary:      Effort: Pulmonary effort is normal. No respiratory distress.      Breath sounds: Normal breath sounds. No wheezing.   Abdominal:      General: Bowel sounds are normal. There is no distension.      Palpations: Abdomen is soft. There is no mass.      Comments: Ostomy in place with liquid brown stool.  Scattered bruises on the abdomen bilaterally from Lovenox injections.   Musculoskeletal:      Cervical back: Neck supple.      Right lower leg: No edema.      Left lower leg: No edema.   Lymphadenopathy:      Cervical: No cervical adenopathy.   Skin:     General: Skin is warm and dry.      Findings: No lesion or rash.   Neurological:      Mental Status: She is alert and oriented to person, place, and " time.     03/16/2021 physical exam is unchanged from above except as updated.    RECENT LABS:    Lab Results   Component Value Date    WBC 3.93 03/16/2021    HGB 16.5 (H) 03/16/2021    HCT 47.6 (H) 03/16/2021    MCV 99.0 (H) 03/16/2021     03/16/2021       Assessment/Plan      ASSESSMENT:   1.  Rectal cancer, rectal ultrasound examination reported T3N0 disease without lymphadenopathy.   · CT scan of chest, abdomen and pelvis reported no lymphadenopathy in the abdomen, pelvis or chest. She does have small pulmonary nodule 6-7 mm and 2 mm with indeterminate feature. There is no solid evidence of metastatic disease otherwise.   · Based on the CT scan and rectal ultrasound imaging studies, this patient had stage IIA (T3N0M0) disease.    · She had PET scan examination on 8/8/2019 which reported a hypermetabolic right upper lobe pulmonary nodule 6 mm with SUV 5.6.  This is suspicious for metastatic disease however it is too small to be biopsied.  This patient may have stage IV disease.   · She initiated concurrent radiation with continuous 5-FU on 8/12/2019.  · Patient finished concurrent chemoradiation therapy.  · Patient underwent surgical resection of the rectal tumor and diverting loop ileostomy on 12/2/2019 with Dr. Ye.  Pathology evaluation reported residual T3 disease, with 1 out of 14 lymph nodes positive for malignancy.  Certainly this patient has at least stage IIIb rectal cancer (T3 N1 M0?)  · On 1/22/2020 adjuvant chemotherapy FOLFOX 6 regimen initiated.    · On 2/5/2022 cycle #2 was delayed secondary to neutropenia.  She was given 3 days of Granix.   · Emend added with cycle 3.  With improved nausea control  · Continuing home Granix x3 days following 5-FU disconnect  · 3/11/2020 due for cycle 4, however, she is experiencing progressive abdominal pain and occasional fevers.   · CT scan performed on 3/13/2020 reveals no evidence of progressive or recurrent disease.  It does reveal possible vaginal  fistula.  · Patient hospitalized 4/24-4/26/20 after cycle 5 of chemotherapy with acute UTI.  CT abdomen/pelvis noting fluid collection in the presacral region having diminished in size compared to CT dated 3/13/2020.  Patient was evaluated by Dr. Ye while in the hospital with further plans to evaluate possible colovaginal fistula following completion of chemotherapy.  Patient did respond to IV antibiotics and discharged home on oral cefdinir.  · 4/29/2020 cycle 6 of FOLFOX.  Urinary symptoms have resolved   · Patient seen in the [facility] ED on 5/2/2020 for what was suspected to be an allergic reaction.  She called our service on Saturday explaining that she was experiencing hand and face swelling.  She was instructed to proceed to the emergency department at which time she was assessed and prescribed a Medrol Dosepak.  She had just completed her 5-FU and was unhooked on Friday, 5/1/2020.  Her symptoms have resolved in the office on assessment, 5/5/2020.  · The patient had grade 3 hand-foot syndrome based on symptomology.  Patient had cycle #8 of 5-FU on 5/13/2020. Due to her symptoms and poor tolerance to the 5-FU, her treatment dose will be reduced 50% for oxaliplatin and infusional 5-FU.  Bolus 5-FU will be discontinued..  · On 5/13/2020  We discussed further treatment options pending the scan results.  If she has indeed residual disease or metastatic disease, we will switch her to irinotecan plus Avastin or anti-EGFR monoclonal antibody.  She will need a further molecular testing of her tumor samples in that case.  · On 5/21/2020 patient had a PET scan and it showed no evidence of of metastatic disease in the neck, chest, abdomen or pelvis.  There was fluid accumulation/abscess.   · On 5/27/2020 I discussed with the patient that we can discontinue chemotherapy at this time.  She will follow-up with Dr. Ye to discuss a possibility to reverse the ileostomy.  We likely will obtain anther PET scan in 3 to 4  months for reassessment.    · Patient was seen by Dr. Ye on 6/19/2019 for evaluation and to discuss possible take down of her ileostomy.  She is scheduled to have a gastrografin enema on 7/2/2020 to evaluate for a fistula, and then a colonoscopy on 7/15/2020, both done by Dr. Ye.  She states that based on the above imaging and procedure findings, she may have a more extensive surgery done or just have her ileostomy reversed, which would likely be done in August 2020.  This will all be coordinated under the care of Dr. Ye.     · I reviewed scan results with the patient on 9/16/2020 for the most recent CT scan from 09/09/2020 and also compared it to her previous PET scan examination from 05/21/2020 and also the original PET scan from 08/09/2019.  The original PET scan there is a small right upper lobe pulmonary nodule 6 mm with SUV 5.6. So in the 05/2020 PET scan the nodule is still there but activity seems much less and this most recent CT scan examination also documented the preexisting small pulmonary nodule however there is a new left lower lobe pulmonary nodule 8 mm in size and I shared with the patient today this nodule is suspicious for metastatic disease. Laboratory studies reported minimal CEA level 1.32 on 09/09/2020. Liver function panel was only marginally abnormal with the ALT 45, the remaining is normal. However laboratory study today showed worsening AST 55, ALT 95 and alkaline phosphatase 143 but is still normal, total bilirubin 0.3.   · So I discussed with the patient on 9/16/2020 recommended to have repeat PET scan examination for assessment because the left lower lobe pulmonary nodule is too small to be biopsied, and if the PET scan reports hypermetabolic lesion, I recommended to have stereotactic radiation therapy. Explained to patient that she is not a really good candidate to have thoracotomy for resection because she still has unhealed wound in the abdomen. I think the stereotactic  radiation therapy will be a more feasible choice.  · Laboratory study on 9/16/2020 reported worsening liver function panel with both elevated AST, ALT and also slightly worsened alkaline phosphatase despite having normal total bilirubin. I recommended to repeat laboratory study for reevaluation. The only new medication she has in the past several days is Augmentin but otherwise no change of condition. She denies any recent viral infection. We will monitor this.   · PET scan examination obtained on 9/18/2020 showed metastatic disease.  It reported a few hypermetabolic pulmonary nodules, and some new small pulmonary nodules, all of them highly suspicious for metastatic disease.  She also has hypermetabolic activity in the abdomen and pelvic area which could be related to scar tissue from her recent surgery versus metastatic disease.  Nevertheless overall picture fits with metastatic cancer.  · I discussed with the patient on 9/20/2020, we reviewed the PET scan images together, and I recommended to have systematic chemotherapy, I do not think stereotactic reading therapy to the hypermetabolic lesions in the lungs warranted at this time because even those will be treated with reading therapy, she will still need systematic chemotherapy.  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  I did discuss with the patient using anti-EGFR monoclonal antibody versus anti-VEGF monoclonal antibody.     · Palliative chemotherapy cycle#1 FOLFIRI was started on 10/13/2020.  No bolus 5-FU given considering previous poor tolerance.  Genetic studies still pending.   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  Microsatellite stable, mutation burden 5/Mb.    · Discussed with the patient 10/27/2020, because of the [place] mutation, she is not a candidate for anti-EGFR monoclonal antibody such as Erbitux or vectibix.  She could be a candidate for anti-VEGF monoclonal antibody,  however because of active wound, she is not a candidate right now at this moment.  · Patient seen in the ED for acute right neck pain on 11/16/2020.  CT angiogram as well as CT of the cervical spine performed with no acute findings but notably one of her pulmonary nodules, left upper lobe, somewhat decreased in size.  Patient given prednisone pack.  Further details below.  · Patient due today for cycle #4 FOLFIRI.  Plan repeat PET scan after cycle 6.  · Last received cycle #5 chemotherapy without irinotecan on 12/8/2020.   · Patient here 12/29/2020 for evaluation and consideration of cycle 6, but she continues to complain of swelling.  She does have swelling typically after treatment as well.  We will hold treatment for 1 week and reevaluate next week.  Still planning on PET scan following cycle 6.   · Cycle #6 chemotherapy will be resumed on 1/5/2021.  Started back on original irinotecan.  · PET scan examination obtained on 1/12/2021 after cycle #6 chemotherapy reported improved a pulmonary nodule hypermetabolic activity.  Confirmed these are metastatic lesions.  · Patient is evaluated 1/19/2020, will continue cycle #7 FOLFIRI chemotherapy.  However Avastin will be on hold see next section.   · Patient was reevaluated on 2/2/2021, will continue chemotherapy cycle #8 FOLFIRI.  Avastin / biosimilar will be added.    · She [facility] in mid February 2021, and had recommended palliative chemotherapy without surgical intervention of metastatic lung lesions.   · 2/16/2021cycle #9 FOLFIRI plus bevacizumab.  Tolerated last cycle well with only some mild increase in liquid stools.  This was easily controlled with antidiarrheals.  · On 3/2/2021, patient will proceed with cycle #10 FOLFIRI plus bevacizumab.  After 12 cycles, plan to start maintenance treatment.  · 3/16/2021 cycle 11.  Plus bevacizumab.    2 .  Pulmonary nodule, hypermetabolic on PET scan.   · Her original PET  scan examination from 8/8/2019 reported a small 8 mm right upper apex pulmonary nodule but hypermetabolic SUV 5.1.  That was too small to be biopsied, however there was always a possibility of metastatic disease considering that high activity despite a such a small nodule.  · Her chest CT scan examination from 3/13/2020 reported this shrank to 6 mm.    · PET scan examination on 5/21/2020 reported no metabolic activity at this residual nodule.  This needs to be monitored.    · PET scan examination 9/18/2020 reported couple of hypermetabolic pulmonary nodules, besides a few extra small pulmonary nodules.  Those are highly suspicious for metastatic disease.    · PET scan examination obtained on 1/12/2021 after cycle #6 chemotherapy reported improved a pulmonary nodule hypermetabolic activity.  Confirmed these are metastatic lesions.  · 1/19/2021, patient reports she will see thoracic surgeon tomorrow at the [facility] where she seeks second opinion evaluation, and to see whether she would be a candidate for resection of pulmonary nodules.  I discussed with the patient, she has at least 2 hypermetabolic lesions although they are responded to chemotherapy, I do not think she would be a candidate for surgery.   · Thoracic surgeon from [facility] recommended metastectomy and to discontinue chemotherapy afterwards.   · [facility] physician recommended palliative chemotherapy with no surgical intervention.    3.   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  Low-dose Lovenox discontinued and initiated Xarelto 20 mg  daily.  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  · Patient held Xarelto for 2 days prior to MediPort removal from the left chest wall and placement of new port in the right chest wall on 12/18/2020.  She resumed Xarelto that evening.  · Progressive swelling in the neck, face, upper extremities prompting hospitalization with CT scan showing thrombosis in the right subclavian vein and SVC.  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  Although she had been off of Xarelto for a few days, clearly Xarelto was not prohibiting progression of her thrombosis after she resumed treatment and there was some evidence to suggest thrombosis had been present at least in the innominate vein on prior scan in September but now appears chronic.  Current acute thrombosis involving SVC and right subclavian.  · Patient was admitted and placed on heparin drip  · Patient was evaluated by vascular surgery and intervention was not recommended for thrombolysis/thrombectomy.  · On 12/22/2020, the patient developed worsening shortness of breath, increasing upper extremity edema consistent with worsening SVC syndrome.  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  · Symptoms improved and patient was discharged 12/24/2020 on Lovenox 100 mg every 12 hours.    · Returns 12/29/2020 for evaluation continuing Lovenox, overall tolerating it well.  No bleeding issues.  · Improved edema 1/5/2021.  Will continue Lovenox weight-based every 12 hours.  · On 1/19/2021 and 2/2/2021, patient reports improved facial swelling.  She however does have being bruise on her abdomen wall where she does Lovenox injection.  However she does  not have a spontaneous epistaxis, gum bleeding or other bleeding.   · On 2/2/2021, we discussed that side effects of Avastin/biosimilar related to thrombosis.  Since this patient already has been on Lovenox, I think the benefits from treatment for her cancer will outweigh that risk of thrombosis, will proceed ahead with Avastin.  I cautioned patient to watch out for signs of worsening thrombosis patient voiced understanding.   · On 3/16/2021, no evidence of worsening DVT, continue Lovenox.    4.  This patient also has strong family history for malignancy the patient had appointment with genetic counseling on September 3, 2019.  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    5.  Mild anemia, improved since her surgery.    · She also has a history of iron deficiency.  Iron studies reveal a saturation of just 10%.  She was started on oral iron but was unable to tolerate due to GI side effects.   · Status post Injectafer 2/5/2020 and 2/12/2020   · Improved hemoglobin 13.5 on 1/5/2021.  · Hemoglobin 14.7 on 2/16/2021.    · Hemoglobin 16.2 on 3/2/2021.    · 3/16/2020 when hemoglobin now up to 16.2, hematocrit 47.6.  Patient admits that she has started smoking again, and I have encouraged her to quit.  She denies any snoring or sleep apnea diagnosis.  Patient is not taking oral iron.  We will closely monitor.    6.  Peripheral neuropathy secondary to chemotherapy.    · This is mild involving bilateral hands and feet as reported on 9/16/2020.   · Will avoid chemotherapy with oxaliplatin.  · Stable mild neuropathy as of 11/10/2020  · Patient reports worsening peripheral neuropathy and cycle #5 chemotherapy on 12/8/2020 with and without irinotecan.   · On 1/5/2021, patient reports improvement of peripheral neuropathy, will add irinotecan back to chemotherapy.  Continue to monitor and adjust medication.  · On 3/2/2021, patient reports no worsening of peripheral neuropathy after restarting irinotecan    7.  History of hand-foot  syndrome grade 3.    · It become so significant after cycle #7 FOLFOX treatment.  Discussed with patient on 5/13/2020, will discontinue the bolus 5-FU dose, and also decrease 50% of the infusion dose through the pump.   · We also use Medrol Dosepak for possible recurrent symptomology.  She responded this well.   · Her hand-foot syndrome improved.  · Under current treatment with FOLFIRI, patient not receiving 5-FU bolus.  No recurrent issues.    8.  Hyperlacrimation and mild scleral irritation related to 5-FU.  She has been taking steroid eyedrops.  This has improved her hyperlacrimation.  · Not currently an issue.  Continue to monitor with 5-FU.      9.  Abnormal liver function panel.  · Improved liver function panel 9/18/2020.  This is probably related to her recent infection.  · She has normalized AST, alk phosphatase, and a much improved only marginally elevated ALT 35 on 10/13/2020.    · Normalized liver function panel on 10/27/2020.    · Normal liver panel on 11/10/2020.    · Normal liver function panel on 12/5/2021.    · Slightly worsening liver function with AST 33 and ALT 56 1/19/2021.    · Improved AST 21 and ALT 39 on 2/2/2021.    · ALT 59 and AST 29 on 3/2/2021.  Continue to monitor.    10.  Intermittent leukocytopenia secondary to chemotherapy.   · WBC currently normal at 5220 and the neutrophil 3520 on 1/5/2021.  Continue to monitor.    · On 2/2/2021, WBC 4110 including ANC 2540.  Continue to monitor for now.  Consider G-CSF if neutropenia becomes an ongoing issue.   · 2/16/2021 WBC 4.6, ANC 2.79.    · 3/16/2021 WBC 3.93, ANC 2.26, continue to monitor.    11.  Depression.  Patient seen by JULIET Davenport on 11/9/2020.  She was started on mirtazapine.  Lexapro was discontinued.  This has definitely improved her mood with the patient feeling overall much better.  She is sleeping better.  Appetite is improved with her actually eating more than she wants to.  She plans to continue follow-up with  supportive oncology.    12.  Intermittent upper abdominal discomfort.  This improves with eating.  After further discussion with the patient she also notes 3 nights of increased reflux when laying down.  We discussed her symptoms could be related to increase stomach acid or potential ulcer.  She is currently taking Pepcid 20 mg daily.  I instructed her to add Prilosec 20 mg daily.   · On 3/16/2021 patient reports some worsening reflux symptoms last week.  She has increased her Pepcid to 20 mg twice daily, which  did resolve her symptoms.  We discussed she can add Prilosec 20 mg daily as well.  We will continue to monitor.        PLAN:  1. Proceed with cycle 11 palliative chemotherapy with FOLFIRI bevacizumab.  2. Continue Lovenox 100 mg every 12 hours.  3. Patient encouraged to stop smoking.  4. Return in 2 weeks for follow-up visit with nurse practitioner for reevaluation prior to cycle 12 chemotherapy.  5. Patient will have a PET CT scan after cycle 12 to evaluate her treatment response.  6. Patient will return for follow-up in 4 weeks with Dr. Nguyen for reevaluation to review scans, and then further determine treatment plan.  If patient has favorable response we will start maintenance with 5-FU based regimen plus bevacizumab.  7. Continue antidiarrheals and supportive care as above.        Patient continues on high risk medication requires close monitoring for toxicity.    Sheba Matias, APRN  03/16/21        CC:  Deepika Vela III NP-C   MD Perla Bowers MD

## 2021-03-17 RX ORDER — DICYCLOMINE HCL 20 MG
TABLET ORAL
Qty: 360 TABLET | Refills: 0 | Status: SHIPPED | OUTPATIENT
Start: 2021-03-17 | End: 2022-03-31

## 2021-03-18 ENCOUNTER — INFUSION (OUTPATIENT)
Dept: ONCOLOGY | Facility: HOSPITAL | Age: 41
End: 2021-03-18

## 2021-03-18 DIAGNOSIS — C20 ADENOCARCINOMA OF RECTUM (HCC): Primary | ICD-10-CM

## 2021-03-18 DIAGNOSIS — Z45.2 ENCOUNTER FOR FITTING AND ADJUSTMENT OF VASCULAR CATHETER: ICD-10-CM

## 2021-03-18 PROCEDURE — 25010000003 HEPARIN LOCK FLUSH PER 10 UNITS: Performed by: INTERNAL MEDICINE

## 2021-03-18 PROCEDURE — 96523 IRRIG DRUG DELIVERY DEVICE: CPT

## 2021-03-18 RX ORDER — HEPARIN SODIUM (PORCINE) LOCK FLUSH IV SOLN 100 UNIT/ML 100 UNIT/ML
500 SOLUTION INTRAVENOUS AS NEEDED
Status: DISCONTINUED | OUTPATIENT
Start: 2021-03-18 | End: 2021-03-18 | Stop reason: HOSPADM

## 2021-03-18 RX ORDER — HEPARIN SODIUM (PORCINE) LOCK FLUSH IV SOLN 100 UNIT/ML 100 UNIT/ML
500 SOLUTION INTRAVENOUS AS NEEDED
Status: CANCELLED | OUTPATIENT
Start: 2021-03-18

## 2021-03-18 RX ORDER — SODIUM CHLORIDE 0.9 % (FLUSH) 0.9 %
10 SYRINGE (ML) INJECTION AS NEEDED
Status: DISCONTINUED | OUTPATIENT
Start: 2021-03-18 | End: 2021-03-18 | Stop reason: HOSPADM

## 2021-03-18 RX ORDER — SODIUM CHLORIDE 0.9 % (FLUSH) 0.9 %
10 SYRINGE (ML) INJECTION AS NEEDED
Status: CANCELLED | OUTPATIENT
Start: 2021-03-18

## 2021-03-18 RX ADMIN — SODIUM CHLORIDE, PRESERVATIVE FREE 10 ML: 5 INJECTION INTRAVENOUS at 12:02

## 2021-03-18 RX ADMIN — Medication 500 UNITS: at 12:02

## 2021-03-18 NOTE — PROGRESS NOTES
REASON FOR FOLLOW UP:     1. Rectal cancer, rectal ultrasound examination in July 2019 reported T3N0 disease without lymphadenopathy. She does have small pulmonary nodule 6-7 mm and 2 mm with indeterminate feature. There is no solid evidence of metastatic disease otherwise. Patient has stage IIA (T3N0M0) disease.  2. The patient is heterozygous factor V Leiden, was on prophylactic anticoagulation with Lovenox 40 mg daily given her increased risk of thrombosis with MediPort and GI malignancy.   3. PET scan on 8/8/2019 reported an 8 mm hypermetabolic right upper lobe pulmonary nodule, which is suspicious for metastatic as well.    4. Patient had a PowerPort placement on the left upper chest by Dr. Joseph on 8/9/2019.  5. Patient was started on concurrent infusional 5-FU chemoradiation therapy on 8/12/2019, with planned complete surgical resection and further adjuvant chemotherapy with intention to cure the disease.   6. Patient underwent surgical resection of the primary rectal cancer by Dr. Ye on 12/2/2019.  Pathology evaluation reported residual T3N1 disease stage IIIb.  7. Diarrhea related to therapy and radiation.   8. Patient was started cycle 1 day 1 of adjuvant FOLFOX 6 on 1/23/2020.  9. On 2/5/2020 FOLFOX 6 cycle 2 delayed secondary to neutropenia.  Patient was given 3 days of Granix injection.  After cycle #2 we planned 3 days of Granix with each cycle.   10. Patient also intolerant of oral iron.  Patient received 2 doses of IV injectafer on 02/05/2020 and 02/12/2020.   11. 02/12/2020 Proceed with cycle #2 of FOLFOX 6 with 3 days of Granix.  12. On 3/11/2020 cycle 4 postponed secondary to abdominal pain and occasional low-grade fevers.  CT scans ordered.  13. Cycle #4 resumed after CT scan revealed no evidence of disease.  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 Ye. patient has no fever.  Continue to observe.   14. Grade 3  hand-foot syndrome secondary to 5-FU after cycle #7 FOLFOX 6 chemotherapy, prompting ER visit.  Also has worsening peripheral neuropathy.   15. Cycle #8 FOLFOX 6 was given on 5/13/2020, with 50% dose reduction for both 5-FU and oxaliplatin, and also examination of bolus 5-FU.   16. PET scan examination on 5/21/2020 reported no evidence of metastatic disease in the chest abdomen pelvis.  Stable 6 mm RUL pulmonary nodule.  17. On 5/27/2020, I discussed with the patient that we can discontinue chemotherapy at this time.   18. Patient had a surgical procedure for low anterior colon resection, coloanal anastomosis on 8/3/2020.  19. CT scan for chest abdomen pelvis on 9/9/2020 reported a new 8 mm noncalcified pulmonary nodule in the left lower lobe of the lung.  Otherwise stable right upper lobe 6 mm pulmonary nodule, and no evidence of disease recurrence in the abdomen or pelvis.  20. PET scan examination on 9/18/2020 reported multiple hypermetabolic small pulmonary nodules/ new pulmonary nodules.   21. Patient was started on pelvic chemotherapy with FOLFIRI regimen on 10/13/2020.   22. 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 It reported tumor mutation burden 5 Muts/Mb, microsatellite stable, TP53 mutation R282W, and others.   23. Cycle #5 was without irinotecan, due to peripheral neuropathy.  24. 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  Patient was switched to weight-based Lovenox injection.  25. Cycle #6 5-FU and irinotecan was delayed by 2 weeks because of the above incident.  26. Patient had a telemedicine evaluation at that the [facility] [place] in February 2021.  They agreed with our treatment plan for palliative chemotherapy followed by maintenance chemotherapy.      HISTORY OF PRESENT ILLNESS:  The patient is a 40 y.o. female with the above-mentioned  history who is here today for lab review and evaluation due for her 12th cycle of FOLFIRI and bevacizumab.   At her last visit we noted that her hemoglobin hematocrit had slightly elevated, patient did admit that she was smoking more and she was counseled.  She states that she has significantly cut back, although not quit.      Patient is complaining of worsening issues with nausea, particularly on treatment day.  She is asking if we can give her Phenergan, as it has helped her in the past.    Continues on Lovenox injections every 12 hours and denies bleeding issues.      Past Medical History:   Diagnosis Date   • Abdominopelvic abscess (CMS/Formerly [facility]) 08/12/2020    ADMITTED TO [facility]   • Abnormal Pap smear of cervix 02/02/1998    JULIUS I   • Anemia in pregnancy    • Anxiety    • Bilateral epiphora 04/2020   • Bilateral hand swelling 05/02/2020    SEEN AT [facility] ER   • Cervical lymphadenitis 06/06/2012    SEEN AT [facility] ER   • Chemotherapy induced neutropenia (CMS/Formerly [facility]) 04/2020   • Chemotherapy-induced nausea 02/2020   • Chemotherapy-induced thrombocytopenia 05/2020   • Chronic diarrhea    • Colon polyps     FOLLOWED BY DR. GERONIMO ESPARZA   • Cystitis 04/24/2020    WITH DEHYDRATION, ADMITTED TO [facility]   • Cystitis 10/27/2020   • Drug-induced peripheral neuropathy (CMS/Formerly [facility]) 05/2020   • Fistula of intestine    • GERD (gastroesophageal reflux disease)    • Hand foot syndrome 05/2020   • Heart murmur     IN CHILDHOOD   • Hematochezia    • Hemorrhoids    • Heterozygous factor V Leiden mutation (CMS/Formerly [facility])     DX 8-2-2019   • History of anemia    • History of chemotherapy 2019    FOLLOWED BY DR. ALEXANDRU HUNT   • History of gestational diabetes    • History of pre-eclampsia    • History of radiation therapy 2019    FOLLOWED BY DR. JAVON LEWIS   • History of TB skin testing 01/17/2009    TB Skin Test   • HPV in female 1998   • Hypokalemia 09/2019   • Hypomagnesemia 09/2019   • IBS (irritable bowel syndrome)    • Ileostomy in place (CMS/Formerly [facility])      FOLLOWED BY DR. PADMAJA ESPARZA   • Infected insect bite of neck 2016    SEEN AT [facility]   • Kidney stones 2007    SEEN AT [facility] ER   • Lump of right breast     SWOLLEN LYMPH NODE   • Lung cancer (CMS/HCC) 2020    METASTATIC LUNG CANCER   • Monopolar depression (CMS/HCC)    • On anticoagulant therapy    • Perirectal abscess 2020   • PIH (pregnancy induced hypertension)    • Pulmonary embolism without acute cor pulmonale (CMS/HCC) 09/20/2019    X 3; ADMITTED TO [facility]   • Pulmonary nodule, right 2020   • Rectal cancer (CMS/HCC) 2019    STAGE IIA, INVASIVE MODERATELY DIFFERENTIATED ADENOCARCINOMA, CHEMO AND XRT FINISHED 2019   • Right shoulder pain 2020    SEEN AT [facility] ER   • Right ureteral stone 10/01/2019    SEEN AT [facility] ER   • SAB (spontaneous ) 1996     A2-1 INDUCED   • Sciatica    • Sepsis due to cellulitis (CMS/HCC) 2002    LEFT GREAT TOE, ADMITTED TO [facility]   • Tachycardia 2020   • Urinary urgency 2020     Past Surgical History:   Procedure Laterality Date   • CHOLECYSTECTOMY N/A 10/10/2003    LAPAROSCOPIC WITH CHOLANGIOGRAM, DR. JAMEY TALAVERA AT [facility]   • COLON RESECTION N/A 2019    Procedure: laparoscopic low anterior colon resection with mobilization of splenic flexure and diverting loop ileostomy: ERAS;  Surgeon: Padmaja Esparza MD;  Location: [facility];  Service: General   • COLON RESECTION N/A 8/3/2020    Procedure: LOW ANTERIOR COLON RESECTION, COLOANAL ANASTOMOSIS, MOBILIZATION SPLENIC FLEXURE;  Surgeon: Padmaja Esparza MD;  Location: [facility] OR;  Service: General;  Laterality: N/A;   • COLONOSCOPY N/A 7/15/2020    PATENT ANASTAMOSIS IN MID RECTUM, RESCOPE IN 1 YR, DR. PADMAJA ESPARZA AT [facility]   • COLONOSCOPY W/ POLYPECTOMY N/A 2019    15 MM TUBULOVILLOUS ADENOMA POLYP IN HEPATIC FLEXURE, 20 MMTUBULOVILLOUS ADENOMA WITH HIGH GRADE DYSPLASIA IN RECTOSIGMOID, 4 CM MASS IN MID RECTUM, PATH: INVASIVE MODERATELY  DIFFERENTIATED ADENOCARCINOMA, DR. JENNIFER LI AT [facility]   • DILATATION AND EVACUATION N/A 2009   • ENDOSCOPY N/A 07/08/2019    LA GRADE A ESOPHAGITIS, GASTRITIS, ALL BIOPSIES BENIGN, DR. JENNIFER LI AT [facility]   • INCISION AND DRAINAGE PERIRECTAL ABSCESS N/A 8/14/2020    Procedure: INCISION AND DRAINAGE OF retrorectal dehiscence abcess with drain placement and irrigation;  Surgeon: Geronimo Ye MD;  Location: [facility] OR;  Service: General;  Laterality: N/A;   • PAP SMEAR N/A 01/24/2014   • SIGMOIDOSCOPY N/A 7/24/2019    INFILTRATIVE PARTIALLY OBSTRUCTING LARGE RECTAL CANCER, AREA TATOOED, DR. GERONIMO YE AT [facility]   • SIGMOIDOSCOPY N/A 11/23/2019    AN ULCERATED PARTIALLY OBSTRUCTING MASS IN MID RECTUM, AREA TATTOOED, DR. GERONIMO YE AT [facility]   • TRANSRECTAL ULTRASOUND N/A 7/24/2019    Procedure: ULTRASOUND TRANSRECTAL;  Surgeon: Geronimo Ye MD;  Location: [facility] ENDOSCOPY;  Service: General   • URETEROSCOPY LASER LITHOTRIPSY WITH STENT INSERTION Right 10/30/2019    DR. ESTUARDO BEASLEY AT [place]   • VAGINAL DELIVERY N/A 09/18/1998   • VENOUS ACCESS DEVICE (PORT) INSERTION Left 8/9/2019    Procedure: INSERTION VENOUS ACCESS DEVICE;  Surgeon: Sj Joseph MD;  Location: [facility] OR [facility];  Service: General   • VENOUS ACCESS DEVICE (PORT) INSERTION N/A 12/18/2020    Procedure: INSERTION VENOUS ACCESS DEVICE right side, removal venous access device left side;  Surgeon: Sj Joseph MD;  Location: [facility] OR [facility];  Service: General;  Laterality: N/A;   • WISDOM TOOTH EXTRACTION Bilateral 1993       HEMATOLOGIC/ONCOLOGIC HISTORY:      The patient reports she has intermittent rectal bleeding and urgency, mucous with her stool, starting sometime in 2016. At that time she was referred to GI service, and was diagnosed of irritable bowel syndrome. Nevertheless she had worsening urgency for bowel movement, and had a couple of incidents losing control of stool. She was recently seen  by Roland Thorpe MD again and had colonoscopy and EGD exam on 07/08/2019. She was found having a circumferential rectal mass. According to the procedure note, the patient had a fungating circumferential bleeding 4 cm mass in the middle rectum, at distance between 13 cm and 17 cm from the anus. Mass was causing partial obstruction. There were also colon polyps found at the hepatic flexure and also at the rectosigmoid junction 23 cm from the anus. Both were resected and retrieved. EGD examination reported grade A esophagitis at the GE junction and patchy discontinuous erythema and aggravation of the mucosa without bleeding in the stomach body. There is normal mucosa of the duodenum. Pathology evaluation from this procedure reported moderately differentiated adenocarcinoma involving the rectal mass. The rectal sigmoid junction polyp was tubular/tubulovillous adenoma with high grade dysplasia negative for invasive malignancy. The hepatic flexure polyp was also tubular/tubulovillous adenoma negative for high grade dysplasia or malignancy. The biopsy from the esophagus reports squamocolumnar mucosa with inflammatory changes suggestive of mild reflux esophagitis, negative for interstitial metaplasia. Gastric biopsy was benign and duodenal biopsy was also benign. There is no mention of H-pylori.     Patient was subsequently referred to colorectal surgeon Padmaja Ye MD for further evaluation. The patient had CT scan examination for chest, abdomen and pelvis requested by Dr. Ye and were done on 07/13/2019. The study reported no evidence of lymphadenopathy in the abdomen and pelvis. There was wall thickening of the rectosigmoid junction. Normal spleen, pancreas, adrenal glands and kidneys. There was fatty liver infiltration but no focal lymphatic lesions. There was a small 6-7 mm noncalcified nodule in the right upper lobe and a tiny 3 mm subpleural nodule in the right middle lobe. No mediastinal or hilar lymphadenopathy.      Dr. Ye performed staging rectal ultrasound examination. This study reported tumor penetrating through the muscularis propria as T3 disease; there were no lymph nodes identified.    She had a hospitalization in late September 2019 because of newly discovered pulmonary emboli.  She was on prophylactic Lovenox prior to the incident of PE.  Now she is on full dose Xarelto anticoagulation.  Patient reports no further chest pain dyspnea.  She denies bleeding or bruising.  During her hospitalization, she was seen by Dr. Ye, who plans to have surgery 8 to 12 weeks after finishing radiation therapy.  She finished her radiation on 9/19/2019.     I noticed patient also presented to the emergency room on 10/1/2019 complaining of right flank area pain.  I reviewed the images studies and indeed she has a very small 1 to 2 mm obstructing kidney stone in the UV junction.  Patient is still symptomatic with some pains and dysuria.  She denies fever sweating or chills.    Laboratory study on 10/7/2019 reported normal CBC including hemoglobin 13.1, platelets 301,000, WBC 6170 and ANC 4900 lymphocytes 590 monocytes 480.      The nurse reported malfunction of port-a-catheter on 10/7/2019.  Port study on 10/8/2019 showed fibrin sheath around the distal aspect of the Mediport catheter in the SVC. This does not appear to hinder injection, but does prevent aspiration at this time.    Patient underwent surgical resection of the colon on 12/2/2019 with Dr. Ye.  Pathology evaluation reported residual T3 disease, also 1 out of 14 lymph nodes positive for malignancy.  So this patient in either had at least stage IIIb disease (T3 N1 M0?).      Adjuvant chemotherapy FOLFOX 6 will be started on 1/22/2020.  Laboratory study reported iron saturation 10%, free iron 31 TIBC 319 and ferritin 168.  Her hemoglobin was 11.8, WBC 5600, and platelets 347,000.    Patient was here on 02/12/2020 for cycle 2 of her FOLFOX.  This is delayed x1 week  secondary to neutropenia.  The patient ultimately received 3 days worth of Neupogen with recovery of her blood counts.  Of note, the patient struggled with significant nausea without any episodes of vomiting following cycle #1 of chemotherapy resulting in significant weight loss.  She is up 12 pounds over the last week as her appetite has normalized.  We will add Emend to her care plan.    She is having several loose stools per her colostomy and has been hesitant to take Imodium due to prior history of constipation.  I encouraged her to try this with a maximum of 8 tablets/day.  She denies any infectious symptoms including fevers or chills.  No excess bleeding or bruising noted.  She had the expected cold sensitivity related to the Oxaliplatin for about 3 days following treatment.    Labs from 02/12/2020 demonstrated total white blood cell count of 5.14, ANC of 3.06, hemoglobin of 11.2, platelets of 211,000.  She was found to be iron deficient last week and is intolerant to oral iron secondary to GI distress.  For this reason, she initiated IV iron therapy with Injectafer last week.  She had received her second dose 02/12/2020    Patient has normalized hemoglobin 12.2 on 2/26/2020.    She reported on 5/5/2020 she had a recent visit to the emergency department for what was suspected to be an allergic reaction.  She called our on-call service on Saturday with reports of hand and face swelling.  She was instructed to proceed to the emergency department at which time she was assessed and prescribed a Medrol Dosepak.  She had just completed her 5-FU and was unhooked on Friday, 5/3/2020.  Her symptoms have improved.  She does report persistent hyperpigmentation and mild swelling of the palms of the hands but this is much improved.  It was her right hand specifically that was swollen.  Her facial swelling has resolved.  She continues on Cefdinir nd since has 1 day remaining, she was prescribed cefdinir for a UTI requiring  hospitalization at the end of April.  Reports no new symptoms.  Her labs are stable.  She is scheduled for treatment again.    Patient states at this time she is not tolerating her chemotherapy well.     Patient seen in the emergency department on 5/2/2020 for what was suspected to be an allergic reaction.  She called our on-call service on Saturday explaining that she was experiencing hand and face swelling.  She was instructed to proceed to the emergency department at which time she was assessed and prescribed a Medrol Dosepak.  She had just completed her 5-FU and was unhooked on Friday, 5/1/2020.      She reports since her ED visit on 5/2/2020 her symptoms have not improved. Her hands and feet were swollen upon presenting to the ED and she could barely make a fist. She states that she feels so swollen she is not able to stand it. She states that her skin on the hands and feet are peeling extensively as well, besides changing color to more dark.     She also reports significant fatigue after her first week of the 5-FU treatment but she expected this side effect. She also notices significant increase in her neuropathy to the point that she is not able to even walk around in her home without her house slippers due to irritation from her rugs. She denies and associated nausea or vomiting at this time. She also has episodes of epistaxis every day after the chemotherapy cycle #7.  She does report working full-time during the week of chemotherapy.    Laboratory studies, 5/13/2020, show moderate thrombocytopenia platelets 123,000, low normal WBC 4140 including ANC 2720 and normal hemoglobin 13.4.  Because significant hand-foot syndrome, will decrease both 5-FU and oxaliplatin by 50%, and eliminate bolus dose of 5-FU.    On 5/21/2020 patient had a PET scan performed which showed no convincing evidence of residual disease in abdomen or pelvis, no metastatic disease within the chest or neck.     Cycle #8 FOLFOX 6 was given  on 5/13/2020, with 50% dose reduction for both 5-FU and oxaliplatin, and also examination of bolus 5-FU.  She states on 5/27/2020 that with the recent reduction of the chemotherapy she feels significantly better. She has more energy and is able to do her daily routine.      PET scan examination on 5/21/2020 reported no evidence of metastatic disease in chest abdomen pelvis.  There was a stable 6 mm right upper lobe pulmonary nodule.    Laboratory studies on 5/27/2020 showed mild leukocytopenia WBC 3720 but a normal ANC 2250 and lymphocytes 630.  Normal platelets 163,000 and hemoglobin 12.6.  Chemistry lab reported normal renal function, liver function panel and a electrolytes, elevated glucose 164.    Laboratory studies 6/24/2020, showed normal hemoglobin 13.4 but macrocytosis .9.  Normal platelets 210,000 and WBC 5870.  She had normal CMP.     Patient last time was here in late June 2020.  Since that time she had surgical procedure for low anterior colon resection, coloanal anastomosis on 8/3/2020.  She later developed a perirectal abscess, required surgical drainage on 8/14/2020.  She was discharged on 8/27/2020.    Patient reports to me she still has lower abdominal wall vacuum suction in place.  She denies fever sweating chills.  Performance status is ECOG 1.  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  She does have visiting nurse come to the home for wound care.    CT scan for chest abdomen pelvis on 9/9/2020 reported a new 8 mm noncalcified pulmonary nodule in the left lower lobe.  Otherwise stable right upper lobe 6 mm pulmonary nodule, and no evidence of disease recurrence in the abdomen or pelvis.     Laboratory study on 9/16/2020 reported elevated AST 55, ALT 95, alk phosphatase 143 but normal total bilirubin 0.3.  Chemistry lab otherwise unremarkable.  She has completed normal CBC.      Because of the abnormal CT scan examination for chest abdomen pelvis on 9/9/2020  reported a new 8 mm noncalcified pulmonary nodule in the left lower lobe, we obtained a PET scan examination for further evaluation, which was done on 9/18/2020.  This study reported several pulmonary nodules, some of them are hypermetabolic, newly developed compared to previous PET scan in May 2020.  Certainly does highly suspicious for metastatic disease.  There are also hypermetabolic activity in the abdomen and pelvic area which is hard to differentiate from surgical scar versus metastatic malignancy.    Laboratory study on 10/13/2020 reported normal CBC with Hb 13.9, platelets 302,000 and WBC 5520 including ANC 3830.  Chemistry lab reported normalized AST 20, alk phosphatase 116 improved ALT 35, and maintains normal bilirubin, with normal electrolytes and liver function panel.     Patient was started on first cycle of palliative chemotherapy FOLFIRI on 10/13/2020.    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  Patient had a new port placed 12/18/2020, and had held her Xarelto for 2 days prior to surgery.  She presented to the ER on 12/21/20 with complaints of facial and arm swelling for 3 days.  She also noted increased shortness of breath.  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  Thrombus within the right internal jugular vein cannot be excluded.  Patient was started on IV heparin, and eventually transitioned to weight-based Lovenox, which she now continues.      MEDICATIONS    Current Outpatient Medications:   •  clonazePAM (KlonoPIN) 1 MG tablet, TAKE 1 TABLET BY MOUTH THREE TIMES A DAY AS NEEDED FOR ANXIETY OR SEIZURES, Disp: 90 tablet, Rfl: 0  •  Diclofenac Sodium (VOLTAREN) 1 % gel gel, Apply 4 g topically to the appropriate area as directed 3 (Three) Times a Day., Disp: 150 g, Rfl: 3  •  dicyclomine (BENTYL)  20 MG tablet, TAKE 1 TABLET BY MOUTH EVERY 6 HOURS AS NEEDED, Disp: 360 tablet, Rfl: 0  •  diphenoxylate-atropine (LOMOTIL) 2.5-0.025 MG per tablet, TAKE 1 TABLET BY MOUTH 4 (FOUR) TIMES A DAY AS NEEDED FOR DIARRHEA., Disp: 120 tablet, Rfl: 1  •  enoxaparin (Lovenox) 100 MG/ML solution syringe, Inject 1 mL under the skin into the appropriate area as directed Every 12 (Twelve) Hours., Disp: 60 mL, Rfl: 2  •  escitalopram (LEXAPRO) 10 MG tablet, TAKE 1 TABLET BY MOUTH EVERY DAY, Disp: 30 tablet, Rfl: 5  •  famotidine (PEPCID) 20 MG tablet, TAKE 1 TABLET BY MOUTH TWICE A DAY, Disp: 180 tablet, Rfl: 1  •  HYDROcodone-acetaminophen (NORCO) 7.5-325 MG per tablet, Take 1 tablet by mouth Every 6 (Six) Hours As Needed for Moderate Pain ., Disp: 240 tablet, Rfl: 0  •  Loratadine (CLARITIN) 10 MG capsule, Take 10 mg by mouth Every Evening., Disp: , Rfl:   •  ondansetron (ZOFRAN) 8 MG tablet, Take 1 tablet by mouth 3 (Three) Times a Day As Needed for Nausea or Vomiting., Disp: 60 tablet, Rfl: 5  •  prochlorperazine (COMPAZINE) 10 MG tablet, Take 1 tablet by mouth Every 6 (Six) Hours As Needed for Nausea or Vomiting., Disp: 30 tablet, Rfl: 2  •  promethazine (PHENERGAN) 25 MG tablet, Take 1 tablet by mouth Every 6 (Six) Hours As Needed for Nausea or Vomiting., Disp: 60 tablet, Rfl: 2  No current facility-administered medications for this visit.    Facility-Administered Medications Ordered in Other Visits:   •  atropine injection 0.25 mg, 0.25 mg, Intravenous, Q15 Min PRN, Sheba Matias, APRN  •  bevacizumab (AVASTIN) 900 mg in sodium chloride 0.9 % 136 mL chemo IVPB, 900 mg, Intravenous, Once, Sheba Matias, APRN  •  dexamethasone (DECADRON) IVPB 12 mg, 12 mg, Intravenous, Once, Sheba Matias, APRN  •  fluorouracil (ADRUCIL) 4,850 mg, sodium chloride 0.9 % 143 mL 240 mL chemo infusion - FOR HOME USE, 2,400 mg/m2 (Treatment Plan Recorded), Intravenous, Once, Sheba Matias APRN  •  FOSAPREPITANT 150  MG/100ML NORMAL SALINE (CBC) IVPB 100 mL 100 mL, 150 mg, Intravenous, Once, Sheba Matias APRN, Last Rate: 200 mL/hr at 03/30/21 1049, 100 mL at 03/30/21 1049  •  irinotecan (CAMPTOSAR) 365 mg in dextrose (D5W) 5 % 518.3 mL chemo IVPB, 180 mg/m2 (Treatment Plan Recorded), Intravenous, Once, Sheba Matias APRVIRIDIANA  •  leucovorin 810 mg in dextrose (D5W) 5 % 290.5 mL IVPB, 400 mg/m2 (Treatment Plan Recorded), Intravenous, Once, Sheba Matias APRVIRIDIANA  •  promethazine (PHENERGAN) 25 mg in sodium chloride 0.9 % 50 mL, 25 mg, Intravenous, Once, Sheba Matias APRN    ALLERGIES:   No Known Allergies    SOCIAL HISTORY:       Social History     Tobacco Use   • Smoking status: Current Some Day Smoker     Packs/day: 1.00     Years: 24.00     Pack years: 24.00     Types: Electronic Cigarette, Cigarettes   • Smokeless tobacco: Never Used   • Tobacco comment: LAST CIGARETTE 8/12/2020   Vaping Use   • Vaping Use: Former   • Substances: Nicotine   • Devices: Disposable   Substance Use Topics   • Alcohol use: Not Currently   • Drug use: No         FAMILY HISTORY:   Mother has positive factor V Leiden mutation, although she did not have thrombosis, mother also is coronary disease with stenting, she also is occasional bruising.  Maternal grandmother had DVT, she had multiple surgical procedures.  Patient mother's half-brother had metastatic colon cancer at diagnosis in his 50s.  Maternal great aunt has breast cancer.  Patient will follow his skin cancer in his 60s, exclusive.    REVIEW OF SYSTEMS:  Review of Systems   Constitutional: Negative for activity change, appetite change, chills, fatigue, fever and unexpected weight change.   HENT: Negative for hearing loss, mouth sores, nosebleeds and trouble swallowing.    Eyes: Negative for photophobia and visual disturbance.   Respiratory: Negative for cough, chest tightness and shortness of breath.    Cardiovascular: Negative for chest pain, palpitations and leg  "swelling.   Gastrointestinal: Positive for diarrhea (ostomy). Negative for abdominal distention, abdominal pain, anal bleeding, constipation and nausea.        See HPI   Endocrine: Negative for cold intolerance and heat intolerance.   Genitourinary: Negative for dysuria and hematuria.   Musculoskeletal: Negative for arthralgias, back pain, joint swelling and neck pain.   Skin: Negative for color change, pallor and wound.   Allergic/Immunologic: Positive for immunocompromised state (Secondary to adjuvant chemotherapy). Negative for environmental allergies and food allergies.   Neurological: Positive for numbness (Mild numbness involving fingertips and toes). Negative for dizziness, weakness and headaches.   Hematological: Negative for adenopathy. Bruises/bleeds easily (At the site of Lovenox injection only the left abdominal wall).   Psychiatric/Behavioral: Negative for agitation, confusion and dysphoric mood (Depression under control with medication). The patient is not nervous/anxious.    03/30/2021 ROS unchanged from above except as updated.             Vitals:    03/30/21 1009   BP: 124/91   Pulse: (!) 124   Resp: 16   Temp: 97.1 °F (36.2 °C)   SpO2: 96%   Weight: 92.2 kg (203 lb 3.2 oz)   Height: 166 cm (65.35\")   PainSc: 0-No pain     Current Status 3/30/2021   ECOG score 0         Physical Exam  Vitals reviewed.   Constitutional:       General: She is not in acute distress.     Appearance: Normal appearance. She is well-developed.   HENT:      Head: Normocephalic and atraumatic.      Mouth/Throat:      Pharynx: No oropharyngeal exudate.   Eyes:      Pupils: Pupils are equal, round, and reactive to light.   Cardiovascular:      Rate and Rhythm: Regular rhythm. Tachycardia present.      Heart sounds: Normal heart sounds. No murmur heard.     Pulmonary:      Effort: Pulmonary effort is normal. No respiratory distress.      Breath sounds: Normal breath sounds. No wheezing.   Abdominal:      General: Bowel sounds " are normal. There is no distension.      Palpations: Abdomen is soft. There is no mass.      Comments: Ostomy in place with liquid brown stool.  Scattered bruises on the abdomen bilaterally from Lovenox injections.   Musculoskeletal:      Cervical back: Neck supple.      Right lower leg: No edema.      Left lower leg: No edema.   Lymphadenopathy:      Cervical: No cervical adenopathy.   Skin:     General: Skin is warm and dry.      Findings: No lesion or rash.   Neurological:      Mental Status: She is alert and oriented to person, place, and time.     03/30/2021 physical exam is unchanged from above except as updated.    RECENT LABS:    Lab Results   Component Value Date    WBC 3.95 03/30/2021    HGB 15.7 03/30/2021    HCT 47.5 (H) 03/30/2021    MCV 99.2 (H) 03/30/2021     03/30/2021       Assessment/Plan      ASSESSMENT:   1.  Rectal cancer, rectal ultrasound examination reported T3N0 disease without lymphadenopathy.   · CT scan of chest, abdomen and pelvis reported no lymphadenopathy in the abdomen, pelvis or chest. She does have small pulmonary nodule 6-7 mm and 2 mm with indeterminate feature. There is no solid evidence of metastatic disease otherwise.   · Based on the CT scan and rectal ultrasound imaging studies, this patient had stage IIA (T3N0M0) disease.    · She had PET scan examination on 8/8/2019 which reported a hypermetabolic right upper lobe pulmonary nodule 6 mm with SUV 5.6.  This is suspicious for metastatic disease however it is too small to be biopsied.  This patient may have stage IV disease.   · She initiated concurrent radiation with continuous 5-FU on 8/12/2019.  · Patient finished concurrent chemoradiation therapy.  · Patient underwent surgical resection of the rectal tumor and diverting loop ileostomy on 12/2/2019 with Dr. Ye.  Pathology evaluation reported residual T3 disease, with 1 out of 14 lymph nodes positive for malignancy.  Certainly this patient has at least stage IIIb  rectal cancer (T3 N1 M0?)  · On 1/22/2020 adjuvant chemotherapy FOLFOX 6 regimen initiated.    · On 2/5/2022 cycle #2 was delayed secondary to neutropenia.  She was given 3 days of Granix.   · Emend added with cycle 3.  With improved nausea control  · Continuing home Granix x3 days following 5-FU disconnect  · 3/11/2020 due for cycle 4, however, she is experiencing progressive abdominal pain and occasional fevers.   · CT scan performed on 3/13/2020 reveals no evidence of progressive or recurrent disease.  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  · Patient hospitalized 4/24-4/26/20 after cycle 5 of chemotherapy with acute UTI.  CT abdomen/pelvis noting fluid collection in the presacral region having diminished in size compared to CT dated 3/13/2020.  Patient was evaluated by Dr. Ye while in the hospital with further plans to evaluate possible colovaginal fistula following completion of chemotherapy.  Patient did respond to IV antibiotics and discharged home on oral cefdinir.  · 4/29/2020 cycle 6 of FOLFOX.  Urinary symptoms have resolved   · Patient seen in the [facility] ED on 5/2/2020 for what was suspected to be an allergic reaction.  She called our service on Saturday explaining that she was experiencing hand and face swelling.  She was instructed to proceed to the emergency department at which time she was assessed and prescribed a Medrol Dosepak.  She had just completed her 5-FU and was unhooked on Friday, 5/1/2020.  Her symptoms have resolved in the office on assessment, 5/5/2020.  · The patient had grade 3 hand-foot syndrome based on symptomology.  Patient had cycle #8 of 5-FU on 5/13/2020. Due to her symptoms and poor tolerance to the 5-FU, her treatment dose will be reduced 50% for oxaliplatin and infusional 5-FU.  Bolus 5-FU will be discontinued..  · On 5/13/2020  We discussed further treatment options pending the scan results.  If she has indeed residual disease or metastatic disease, we will switch  her to irinotecan plus Avastin or anti-EGFR monoclonal antibody.  She will need a further molecular testing of her tumor samples in that case.  · On 5/21/2020 patient had a PET scan and it showed no evidence of of metastatic disease in the neck, chest, abdomen or pelvis.  There was fluid accumulation/abscess.   · On 5/27/2020 I discussed with the patient that we can discontinue chemotherapy at this time.  She will follow-up with Dr. Ye to discuss a possibility to reverse the ileostomy.  We likely will obtain anther PET scan in 3 to 4 months for reassessment.    · Patient was seen by Dr. Ye on 6/19/2019 for evaluation and to discuss possible take down of her ileostomy.  She is scheduled to have a gastrografin enema on 7/2/2020 to evaluate for a fistula, and then a colonoscopy on 7/15/2020, both done by Dr. Ye.  She states that based on the above imaging and procedure findings, she may have a more extensive surgery done or just have her ileostomy reversed, which would likely be done in August 2020.  This will all be coordinated under the care of Dr. Ye.     · I reviewed scan results with the patient on 9/16/2020 for the most recent CT scan from 09/09/2020 and also compared it to her previous PET scan examination from 05/21/2020 and also the original PET scan from 08/09/2019.  The original PET scan there is a small right upper lobe pulmonary nodule 6 mm with SUV 5.6. So in the 05/2020 PET scan the nodule is still there but activity seems much less and this most recent CT scan examination also documented the preexisting small pulmonary nodule however there is a new left lower lobe pulmonary nodule 8 mm in size and I shared with the patient today this nodule is suspicious for metastatic disease. Laboratory studies reported minimal CEA level 1.32 on 09/09/2020. Liver function panel was only marginally abnormal with the ALT 45, the remaining is normal. However laboratory study today showed worsening AST 55, ALT  95 and alkaline phosphatase 143 but is still normal, total bilirubin 0.3.   · So I discussed with the patient on 9/16/2020 recommended to have repeat PET scan examination for assessment because the left lower lobe pulmonary nodule is too small to be biopsied, and if the PET scan reports hypermetabolic lesion, I recommended to have stereotactic radiation therapy. Explained to patient that she is not a really good candidate to have thoracotomy for resection because she still has unhealed wound in the abdomen. I think the stereotactic radiation therapy will be a more feasible choice.  · Laboratory study on 9/16/2020 reported worsening liver function panel with both elevated AST, ALT and also slightly worsened alkaline phosphatase despite having normal total bilirubin. I recommended to repeat laboratory study for reevaluation. The only new medication she has in the past several days is Augmentin but otherwise no change of condition. She denies any recent viral infection. We will monitor this.   · PET scan examination obtained on 9/18/2020 showed metastatic disease.  It reported a few hypermetabolic pulmonary nodules, and some new small pulmonary nodules, all of them highly suspicious for metastatic disease.  She also has hypermetabolic activity in the abdomen and pelvic area which could be related to scar tissue from her recent surgery versus metastatic disease.  Nevertheless overall picture fits with metastatic cancer.  · I discussed with the patient on 9/20/2020, we reviewed the PET scan images together, and I recommended to have systematic chemotherapy, I do not think stereotactic reading therapy to the hypermetabolic lesions in the lungs warranted at this time because even those will be treated with reading therapy, she will still need systematic chemotherapy.  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  I did discuss with the patient using anti-EGFR monoclonal antibody  versus anti-VEGF monoclonal antibody.     · Palliative chemotherapy cycle#1 FOLFIRI was started on 10/13/2020.  No bolus 5-FU given considering previous poor tolerance.  Genetic studies still pending.   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  Microsatellite stable, mutation burden 5/Mb.    · Discussed with the patient 10/27/2020, because of the [place] mutation, she is not a candidate for anti-EGFR monoclonal antibody such as Erbitux or vectibix.  She could be a candidate for anti-VEGF monoclonal antibody, however because of active wound, she is not a candidate right now at this moment.  · Patient seen in the ED for acute right neck pain on 11/16/2020.  CT angiogram as well as CT of the cervical spine performed with no acute findings but notably one of her pulmonary nodules, left upper lobe, somewhat decreased in size.  Patient given prednisone pack.  Further details below.  · Patient due today for cycle #4 FOLFIRI.  Plan repeat PET scan after cycle 6.  · Last received cycle #5 chemotherapy without irinotecan on 12/8/2020.   · Patient here 12/29/2020 for evaluation and consideration of cycle 6, but she continues to complain of swelling.  She does have swelling typically after treatment as well.  We will hold treatment for 1 week and reevaluate next week.  Still planning on PET scan following cycle 6.   · Cycle #6 chemotherapy will be resumed on 1/5/2021.  Started back on original irinotecan.  · PET scan examination obtained on 1/12/2021 after cycle #6 chemotherapy reported improved a pulmonary nodule hypermetabolic activity.  Confirmed these are metastatic lesions.  · Patient is evaluated 1/19/2020, will continue cycle #7 FOLFIRI chemotherapy.  However Avastin will be on hold see next section.   · Patient was reevaluated on 2/2/2021, will continue chemotherapy cycle #8 FOLFIRI.  Avastin / biosimilar will be added.    · [facility]  mid February 2021, and had recommended palliative chemotherapy without surgical intervention of metastatic lung lesions.   · 2/16/2021cycle #9 FOLFIRI plus bevacizumab.  Tolerated last cycle well with only some mild increase in liquid stools.  This was easily controlled with antidiarrheals.  · On 3/2/2021, patient will proceed with cycle #10 FOLFIRI plus bevacizumab.  After 12 cycles, plan to start maintenance treatment.  · 3/16/2021 cycle 11.  Plus bevacizumab.  · 3/30/2021 cycle 12 FOLFIRI plus bevacizumab.  Having issues with worsening nausea despite premeds with dexamethasone, Aloxi, Emend, and Zofran at home.  Patient is requesting a dose of Phenergan, which is helped her in the past.  We will give this to her with treatment today.    2 .  Pulmonary nodule, hypermetabolic on PET scan.   · Her original PET scan examination from 8/8/2019 reported a small 8 mm right upper apex pulmonary nodule but hypermetabolic SUV 5.1.  That was too small to be biopsied, however there was always a possibility of metastatic disease considering that high activity despite a such a small nodule.  · Her chest CT scan examination from 3/13/2020 reported this shrank to 6 mm.    · PET scan examination on 5/21/2020 reported no metabolic activity at this residual nodule.  This needs to be monitored.    · PET scan examination 9/18/2020 reported couple of hypermetabolic pulmonary nodules, besides a few extra small pulmonary nodules.  Those are highly suspicious for metastatic disease.    · PET scan examination obtained on 1/12/2021 after cycle #6 chemotherapy reported improved a pulmonary nodule hypermetabolic activity.  Confirmed these are metastatic lesions.  · 1/19/2021, patient reports she will see thoracic surgeon tomorrow at [facility] where she seeks second opinion evaluation, and to see whether she would be a candidate for resection of pulmonary nodules.  I discussed with the patient, she has at least 2 hypermetabolic  lesions although they are responded to chemotherapy, I do not think she would be a candidate for surgery.   · Thoracic surgeon from [facility] recommended metastectomy and to discontinue chemotherapy afterwards.   · [facility] physician recommended palliative chemotherapy with no surgical intervention.    3.   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  Low-dose Lovenox discontinued and initiated Xarelto 20 mg daily.  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  · Patient held Xarelto for 2 days prior to MediPort removal from the left chest wall and placement of new port in the right chest wall on 12/18/2020.  She resumed Xarelto that evening.  · Progressive swelling in the neck, face, upper extremities prompting hospitalization with CT scan showing thrombosis in the right subclavian vein and SVC.  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  Although she had been off of Xarelto for a few days, clearly Xarelto was not prohibiting progression of her thrombosis after she resumed treatment and there was some evidence to suggest thrombosis had been present at least in the innominate vein on prior scan in September but now appears chronic.  Current acute thrombosis involving SVC and right subclavian.  · Patient was admitted and placed on heparin drip  · Patient was evaluated by vascular surgery and intervention was not  recommended for thrombolysis/thrombectomy.  · On 12/22/2020, the patient developed worsening shortness of breath, increasing upper extremity edema consistent with worsening SVC syndrome.  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  · Symptoms improved and patient was discharged 12/24/2020 on Lovenox 100 mg every 12 hours.    · Returns 12/29/2020 for evaluation continuing Lovenox, overall tolerating it well.  No bleeding issues.  · Improved edema 1/5/2021.  Will continue Lovenox weight-based every 12 hours.  · On 1/19/2021 and 2/2/2021, patient reports improved facial swelling.  She however does have being bruise on her abdomen wall where she does Lovenox injection.  However she does not have a spontaneous epistaxis, gum bleeding or other bleeding.   · On 2/2/2021, we discussed that side effects of Avastin/biosimilar related to thrombosis.  Since this patient already has been on Lovenox, I think the benefits from treatment for her cancer will outweigh that risk of thrombosis, will proceed ahead with Avastin.  I cautioned patient to watch out for signs of worsening thrombosis patient voiced understanding.   · On 3/16/2021, no evidence of worsening DVT, continue Lovenox.    4.  This patient also has strong family history for malignancy the patient had appointment with genetic counseling on September 3, 2019.  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    5.  Mild anemia, improved since her surgery.    · She also has a history of iron deficiency.  Iron studies reveal a saturation of just 10%.  She was started on oral iron but was unable to tolerate due to GI side effects.   · Status post Injectafer 2/5/2020 and 2/12/2020   · Improved hemoglobin 13.5 on 1/5/2021.  · Hemoglobin 14.7 on 2/16/2021.    · Hemoglobin 16.2 on 3/2/2021.    · 3/16/2020 when hemoglobin now up to 16.2, hematocrit 47.6.  Patient  admits that she has started smoking again, and I have encouraged her to quit.  She denies any snoring or sleep apnea diagnosis.  Patient is not taking oral iron.  We will closely monitor.  · 3/30/2020 hemoglobin 15.7, HCT 47.5.  Patient states that she has cut back significantly on her smoking, although not quit yet.  I have encouraged her to continue working on this.  We will continue to closely monitor.    6.  Peripheral neuropathy secondary to chemotherapy.    · This is mild involving bilateral hands and feet as reported on 9/16/2020.   · Will avoid chemotherapy with oxaliplatin.  · Stable mild neuropathy as of 11/10/2020  · Patient reports worsening peripheral neuropathy and cycle #5 chemotherapy on 12/8/2020 with and without irinotecan.   · On 1/5/2021, patient reports improvement of peripheral neuropathy, will add irinotecan back to chemotherapy.  Continue to monitor and adjust medication.  · On 3/2/2021, patient reports no worsening of peripheral neuropathy after restarting irinotecan    7.  History of hand-foot syndrome grade 3.    · It become so significant after cycle #7 FOLFOX treatment.  Discussed with patient on 5/13/2020, will discontinue the bolus 5-FU dose, and also decrease 50% of the infusion dose through the pump.   · We also use Medrol Dosepak for possible recurrent symptomology.  She responded this well.   · Her hand-foot syndrome improved.  · Under current treatment with FOLFIRI, patient not receiving 5-FU bolus.  No recurrent issues.    8.  Hyperlacrimation and mild scleral irritation related to 5-FU.  She has been taking steroid eyedrops.  This has improved her hyperlacrimation.  · Not currently an issue.  Continue to monitor with 5-FU.      9.  Abnormal liver function panel.  · Improved liver function panel 9/18/2020.  This is probably related to her recent infection.  · She has normalized AST, alk phosphatase, and a much improved only marginally elevated ALT 35 on 10/13/2020.     · Normalized liver function panel on 10/27/2020.    · Normal liver panel on 11/10/2020.    · Normal liver function panel on 12/5/2021.    · Slightly worsening liver function with AST 33 and ALT 56 1/19/2021.    · Improved AST 21 and ALT 39 on 2/2/2021.    · ALT 59 and AST 29 on 3/2/2021.  Continue to monitor.    10.  Intermittent leukocytopenia secondary to chemotherapy.   · WBC currently normal at 5220 and the neutrophil 3520 on 1/5/2021.  Continue to monitor.    · On 2/2/2021, WBC 4110 including ANC 2540.  Continue to monitor for now.  Consider G-CSF if neutropenia becomes an ongoing issue.   · 2/16/2021 WBC 4.6, ANC 2.79.    · 3/16/2021 WBC 3.93, ANC 2.26, continue to monitor.  · 3/30/2021 AST 26, ALT 46, alkaline phosphatase 109, total bilirubin 0.3.  Continue to monitor.    11.  Depression.  Patient seen by JULIET Davenport on 11/9/2020.  She was started on mirtazapine.  Lexapro was discontinued.  This has definitely improved her mood with the patient feeling overall much better.  She is sleeping better.  Appetite is improved with her actually eating more than she wants to.  She plans to continue follow-up with supportive oncology.    12.  Intermittent upper abdominal discomfort.  This improves with eating.  After further discussion with the patient she also notes 3 nights of increased reflux when laying down.  We discussed her symptoms could be related to increase stomach acid or potential ulcer.  She is currently taking Pepcid 20 mg daily.  I instructed her to add Prilosec 20 mg daily.   · On 3/16/2021 patient reports some worsening reflux symptoms last week.  She has increased her Pepcid to 20 mg twice daily, which  did resolve her symptoms.  We discussed she can add Prilosec 20 mg daily as well.  We will continue to monitor.    13.  Proteinuria: 3/30/2020: Patient is 2+ protein in her urine.  Reviewed with Dr. Nguyen, and we will continue with Avastin and closely monitor.  No need for 24-hour urine at  this time.    14.  Tachycardia: Heart rate 124 today, regular.  Patient denies chest pain or palpitations.  She does report some increase in shortness of breath, particularly with exertion, although this is been present since her diagnosis of PE.  Patient states she was on metoprolol at some point, although it was discontinued and she cannot recall why.  We will go ahead and refer her to cardiology for further evaluation.    PLAN:  1. Proceed with cycle 12 palliative chemotherapy with FOLFIRI bevacizumab.  2. Patient will be given IV Phenergan today for hopefully better nausea control.  Have also sent in a prescription for nausea.  She is aware that it can cause drowsiness and has someone to drive her today.  3. Refer to cardiology for tachycardia.  4. Continue Lovenox 100 mg every 12 hours.  5. Patient encouraged to stop smoking.  6. Patient will have a PET CT scan after cycle 12 to evaluate her treatment response.  7. Patient will return for follow-up on 4/13/2021 with Dr. Nguyen for reevaluation to review scans, and then further determine treatment plan.  If patient has favorable response we will start maintenance with 5-FU based regimen plus bevacizumab.  8. Continue antidiarrheals and supportive care as above.        Patient continues on high risk medication requires close monitoring for toxicity.    Sheba Matias, APRN  03/30/21        CC:  WAYNE Worley MD Carrie B. Scharf, MD

## 2021-03-22 RX ORDER — FAMOTIDINE 20 MG/1
TABLET, FILM COATED ORAL
Qty: 180 TABLET | Refills: 1 | Status: SHIPPED | OUTPATIENT
Start: 2021-03-22 | End: 2021-08-23

## 2021-03-29 ENCOUNTER — TELEPHONE (OUTPATIENT)
Dept: ONCOLOGY | Facility: CLINIC | Age: 41
End: 2021-03-29

## 2021-03-29 NOTE — TELEPHONE ENCOUNTER
Caller: JOHANNA    Relationship: PATIENT    Best call back number: [phone] PLEASE LVM IF NO ANSWER    What is the best time to reach you: ANYTIME    What was the call regarding: JOHANNA WOULD LIKE TO GET AN IV OF PHENERGAN WHEN SHE COMES IN FOR HER NEXT CHEMO 03/30 TO HELP HER WITH NAUSEA. SHE'LL NEED TO SET UP A RIDE TO AND FROM IF SO.     Do you require a callback: YES

## 2021-03-29 NOTE — TELEPHONE ENCOUNTER
Pt requesting to have phenergan added to treatment scheduled tomorrow to help with nausea with chemo.  Pt will have ride to and from appt.  Spoke with pt to let her know she will be seeing MICHAEL Scruggs before treatment and that I will put in her request so it can be added to treatment.  Pt v/u. Message sent to MICHAEL Scruggs.

## 2021-03-30 ENCOUNTER — OFFICE VISIT (OUTPATIENT)
Dept: ONCOLOGY | Facility: CLINIC | Age: 41
End: 2021-03-30

## 2021-03-30 ENCOUNTER — INFUSION (OUTPATIENT)
Dept: ONCOLOGY | Facility: HOSPITAL | Age: 41
End: 2021-03-30

## 2021-03-30 VITALS
SYSTOLIC BLOOD PRESSURE: 124 MMHG | HEIGHT: 65 IN | RESPIRATION RATE: 16 BRPM | DIASTOLIC BLOOD PRESSURE: 91 MMHG | OXYGEN SATURATION: 96 % | BODY MASS INDEX: 33.85 KG/M2 | TEMPERATURE: 97.1 F | WEIGHT: 203.2 LBS | HEART RATE: 124 BPM

## 2021-03-30 DIAGNOSIS — C20 ADENOCARCINOMA OF RECTUM (HCC): Primary | Chronic | ICD-10-CM

## 2021-03-30 DIAGNOSIS — R00.0 TACHYCARDIA: ICD-10-CM

## 2021-03-30 DIAGNOSIS — C20 ADENOCARCINOMA OF RECTUM (HCC): ICD-10-CM

## 2021-03-30 DIAGNOSIS — Z79.01 CHRONIC ANTICOAGULATION: Chronic | ICD-10-CM

## 2021-03-30 DIAGNOSIS — T45.1X5A CHEMOTHERAPY-INDUCED NAUSEA: ICD-10-CM

## 2021-03-30 DIAGNOSIS — C20 ADENOCARCINOMA OF RECTUM (HCC): Primary | ICD-10-CM

## 2021-03-30 DIAGNOSIS — R11.0 CHEMOTHERAPY-INDUCED NAUSEA: ICD-10-CM

## 2021-03-30 LAB
ALBUMIN SERPL-MCNC: 3.8 G/DL (ref 3.5–5.2)
ALBUMIN/GLOB SERPL: 1.1 G/DL (ref 1.1–2.4)
ALP SERPL-CCNC: 109 U/L (ref 38–116)
ALT SERPL W P-5'-P-CCNC: 46 U/L (ref 0–33)
ANION GAP SERPL CALCULATED.3IONS-SCNC: 10.2 MMOL/L (ref 5–15)
AST SERPL-CCNC: 26 U/L (ref 0–32)
BASOPHILS # BLD AUTO: 0.02 10*3/MM3 (ref 0–0.2)
BASOPHILS NFR BLD AUTO: 0.5 % (ref 0–1.5)
BILIRUB SERPL-MCNC: 0.3 MG/DL (ref 0.2–1.2)
BILIRUB UR QL STRIP: ABNORMAL
BUN SERPL-MCNC: 10 MG/DL (ref 6–20)
BUN/CREAT SERPL: 11.8 (ref 7.3–30)
CALCIUM SPEC-SCNC: 9.3 MG/DL (ref 8.5–10.2)
CHLORIDE SERPL-SCNC: 103 MMOL/L (ref 98–107)
CLARITY UR: CLEAR
CO2 SERPL-SCNC: 23.8 MMOL/L (ref 22–29)
COLOR UR: YELLOW
CREAT SERPL-MCNC: 0.85 MG/DL (ref 0.6–1.1)
DEPRECATED RDW RBC AUTO: 58.4 FL (ref 37–54)
EOSINOPHIL # BLD AUTO: 0.13 10*3/MM3 (ref 0–0.4)
EOSINOPHIL NFR BLD AUTO: 3.3 % (ref 0.3–6.2)
ERYTHROCYTE [DISTWIDTH] IN BLOOD BY AUTOMATED COUNT: 15.9 % (ref 12.3–15.4)
GFR SERPL CREATININE-BSD FRML MDRD: 74 ML/MIN/1.73
GLOBULIN UR ELPH-MCNC: 3.4 GM/DL (ref 1.8–3.5)
GLUCOSE SERPL-MCNC: 104 MG/DL (ref 74–124)
GLUCOSE UR STRIP-MCNC: NEGATIVE MG/DL
HCT VFR BLD AUTO: 47.5 % (ref 34–46.6)
HGB BLD-MCNC: 15.7 G/DL (ref 12–15.9)
HGB UR QL STRIP.AUTO: ABNORMAL
IMM GRANULOCYTES # BLD AUTO: 0.01 10*3/MM3 (ref 0–0.05)
IMM GRANULOCYTES NFR BLD AUTO: 0.3 % (ref 0–0.5)
KETONES UR QL STRIP: NEGATIVE
LEUKOCYTE ESTERASE UR QL STRIP.AUTO: NEGATIVE
LYMPHOCYTES # BLD AUTO: 1.11 10*3/MM3 (ref 0.7–3.1)
LYMPHOCYTES NFR BLD AUTO: 28.1 % (ref 19.6–45.3)
MCH RBC QN AUTO: 32.8 PG (ref 26.6–33)
MCHC RBC AUTO-ENTMCNC: 33.1 G/DL (ref 31.5–35.7)
MCV RBC AUTO: 99.2 FL (ref 79–97)
MONOCYTES # BLD AUTO: 0.39 10*3/MM3 (ref 0.1–0.9)
MONOCYTES NFR BLD AUTO: 9.9 % (ref 5–12)
NEUTROPHILS NFR BLD AUTO: 2.29 10*3/MM3 (ref 1.7–7)
NEUTROPHILS NFR BLD AUTO: 57.9 % (ref 42.7–76)
NITRITE UR QL STRIP: NEGATIVE
NRBC BLD AUTO-RTO: 0 /100 WBC (ref 0–0.2)
PH UR STRIP.AUTO: 6 [PH] (ref 4.5–8)
PLATELET # BLD AUTO: 189 10*3/MM3 (ref 140–450)
PMV BLD AUTO: 9.3 FL (ref 6–12)
POTASSIUM SERPL-SCNC: 4 MMOL/L (ref 3.5–4.7)
PROT SERPL-MCNC: 7.2 G/DL (ref 6.3–8)
PROT UR QL STRIP: ABNORMAL
RBC # BLD AUTO: 4.79 10*6/MM3 (ref 3.77–5.28)
SODIUM SERPL-SCNC: 137 MMOL/L (ref 134–145)
SP GR UR STRIP: >=1.03 (ref 1–1.03)
UROBILINOGEN UR QL STRIP: ABNORMAL
WBC # BLD AUTO: 3.95 10*3/MM3 (ref 3.4–10.8)

## 2021-03-30 PROCEDURE — 25010000002 FOSAPREPITANT PER 1 MG: Performed by: NURSE PRACTITIONER

## 2021-03-30 PROCEDURE — 85025 COMPLETE CBC W/AUTO DIFF WBC: CPT

## 2021-03-30 PROCEDURE — 96375 TX/PRO/DX INJ NEW DRUG ADDON: CPT

## 2021-03-30 PROCEDURE — 99215 OFFICE O/P EST HI 40 MIN: CPT | Performed by: NURSE PRACTITIONER

## 2021-03-30 PROCEDURE — 25010000002 BEVACIZUMAB 100 MG/4ML SOLUTION 4 ML VIAL: Performed by: NURSE PRACTITIONER

## 2021-03-30 PROCEDURE — 25010000002 PALONOSETRON PER 25 MCG: Performed by: NURSE PRACTITIONER

## 2021-03-30 PROCEDURE — 96367 TX/PROPH/DG ADDL SEQ IV INF: CPT

## 2021-03-30 PROCEDURE — 25010000002 DEXAMETHASONE SODIUM PHOSPHATE 100 MG/10ML SOLUTION: Performed by: NURSE PRACTITIONER

## 2021-03-30 PROCEDURE — 96415 CHEMO IV INFUSION ADDL HR: CPT

## 2021-03-30 PROCEDURE — 80053 COMPREHEN METABOLIC PANEL: CPT

## 2021-03-30 PROCEDURE — 96413 CHEMO IV INFUSION 1 HR: CPT

## 2021-03-30 PROCEDURE — 25010000002 BEVACIZUMAB PER 10 MG: Performed by: NURSE PRACTITIONER

## 2021-03-30 PROCEDURE — 96417 CHEMO IV INFUS EACH ADDL SEQ: CPT

## 2021-03-30 PROCEDURE — 25010000002 ATROPINE PER 0.01 MG: Performed by: NURSE PRACTITIONER

## 2021-03-30 PROCEDURE — 25010000002 LEUCOVORIN CALCIUM PER 50 MG: Performed by: NURSE PRACTITIONER

## 2021-03-30 PROCEDURE — 25010000002 FLUOROURACIL PER 500 MG: Performed by: NURSE PRACTITIONER

## 2021-03-30 PROCEDURE — 25010000002 PROMETHAZINE PER 50 MG: Performed by: NURSE PRACTITIONER

## 2021-03-30 PROCEDURE — 25010000002 IRINOTECAN PER 20 MG: Performed by: NURSE PRACTITIONER

## 2021-03-30 PROCEDURE — 96368 THER/DIAG CONCURRENT INF: CPT

## 2021-03-30 PROCEDURE — 81003 URINALYSIS AUTO W/O SCOPE: CPT

## 2021-03-30 PROCEDURE — 96416 CHEMO PROLONG INFUSE W/PUMP: CPT

## 2021-03-30 RX ORDER — PALONOSETRON 0.05 MG/ML
0.25 INJECTION, SOLUTION INTRAVENOUS ONCE
Status: COMPLETED | OUTPATIENT
Start: 2021-03-30 | End: 2021-03-30

## 2021-03-30 RX ORDER — SODIUM CHLORIDE 9 MG/ML
250 INJECTION, SOLUTION INTRAVENOUS ONCE
Status: COMPLETED | OUTPATIENT
Start: 2021-03-30 | End: 2021-03-30

## 2021-03-30 RX ORDER — PALONOSETRON 0.05 MG/ML
0.25 INJECTION, SOLUTION INTRAVENOUS ONCE
Status: CANCELLED | OUTPATIENT
Start: 2021-03-30

## 2021-03-30 RX ORDER — SODIUM CHLORIDE 9 MG/ML
250 INJECTION, SOLUTION INTRAVENOUS ONCE
Status: CANCELLED | OUTPATIENT
Start: 2021-03-30

## 2021-03-30 RX ORDER — ATROPINE SULFATE 0.4 MG/ML
0.25 AMPUL (ML) INJECTION
Status: DISCONTINUED | OUTPATIENT
Start: 2021-03-30 | End: 2021-03-30 | Stop reason: HOSPADM

## 2021-03-30 RX ORDER — PROMETHAZINE HYDROCHLORIDE 25 MG/1
25 TABLET ORAL EVERY 6 HOURS PRN
Qty: 60 TABLET | Refills: 2 | Status: SHIPPED | OUTPATIENT
Start: 2021-03-30

## 2021-03-30 RX ORDER — ATROPINE SULFATE 1 MG/ML
0.25 INJECTION, SOLUTION INTRAMUSCULAR; INTRAVENOUS; SUBCUTANEOUS
Status: CANCELLED | OUTPATIENT
Start: 2021-03-30

## 2021-03-30 RX ADMIN — PROMETHAZINE HYDROCHLORIDE 25 MG: 25 INJECTION INTRAMUSCULAR; INTRAVENOUS at 11:20

## 2021-03-30 RX ADMIN — PALONOSETRON 0.25 MG: 0.05 INJECTION, SOLUTION INTRAVENOUS at 10:48

## 2021-03-30 RX ADMIN — DEXTROSE MONOHYDRATE 365 MG: 50 INJECTION, SOLUTION INTRAVENOUS at 12:34

## 2021-03-30 RX ADMIN — SODIUM CHLORIDE 100 ML: 9 INJECTION, SOLUTION INTRAVENOUS at 10:49

## 2021-03-30 RX ADMIN — FLUOROURACIL 4850 MG: 50 INJECTION, SOLUTION INTRAVENOUS at 14:17

## 2021-03-30 RX ADMIN — DEXAMETHASONE SODIUM PHOSPHATE 12 MG: 10 INJECTION, SOLUTION INTRAMUSCULAR; INTRAVENOUS at 11:38

## 2021-03-30 RX ADMIN — BEVACIZUMAB 900 MG: 400 INJECTION, SOLUTION INTRAVENOUS at 11:59

## 2021-03-30 RX ADMIN — ATROPINE SULFATE 0.25 MG: 0.4 INJECTION, SOLUTION INTRAMUSCULAR; INTRAVENOUS; SUBCUTANEOUS at 12:30

## 2021-03-30 RX ADMIN — LEUCOVORIN CALCIUM 810 MG: 350 INJECTION, POWDER, LYOPHILIZED, FOR SOLUTION INTRAMUSCULAR; INTRAVENOUS at 12:32

## 2021-03-30 RX ADMIN — SODIUM CHLORIDE 250 ML: 9 INJECTION, SOLUTION INTRAVENOUS at 10:48

## 2021-03-31 ENCOUNTER — BULK ORDERING (OUTPATIENT)
Dept: CASE MANAGEMENT | Facility: OTHER | Age: 41
End: 2021-03-31

## 2021-03-31 DIAGNOSIS — Z23 IMMUNIZATION DUE: ICD-10-CM

## 2021-04-01 ENCOUNTER — INFUSION (OUTPATIENT)
Dept: ONCOLOGY | Facility: HOSPITAL | Age: 41
End: 2021-04-01

## 2021-04-01 DIAGNOSIS — Z45.2 ENCOUNTER FOR FITTING AND ADJUSTMENT OF VASCULAR CATHETER: ICD-10-CM

## 2021-04-01 DIAGNOSIS — C20 ADENOCARCINOMA OF RECTUM (HCC): Primary | ICD-10-CM

## 2021-04-01 PROCEDURE — 25010000003 HEPARIN LOCK FLUSH PER 10 UNITS: Performed by: INTERNAL MEDICINE

## 2021-04-01 RX ORDER — HEPARIN SODIUM (PORCINE) LOCK FLUSH IV SOLN 100 UNIT/ML 100 UNIT/ML
500 SOLUTION INTRAVENOUS AS NEEDED
Status: CANCELLED | OUTPATIENT
Start: 2021-04-01

## 2021-04-01 RX ORDER — HEPARIN SODIUM (PORCINE) LOCK FLUSH IV SOLN 100 UNIT/ML 100 UNIT/ML
500 SOLUTION INTRAVENOUS AS NEEDED
Status: DISCONTINUED | OUTPATIENT
Start: 2021-04-01 | End: 2021-04-01 | Stop reason: HOSPADM

## 2021-04-01 RX ORDER — SODIUM CHLORIDE 0.9 % (FLUSH) 0.9 %
10 SYRINGE (ML) INJECTION AS NEEDED
Status: CANCELLED | OUTPATIENT
Start: 2021-04-01

## 2021-04-01 RX ORDER — SODIUM CHLORIDE 0.9 % (FLUSH) 0.9 %
10 SYRINGE (ML) INJECTION AS NEEDED
Status: DISCONTINUED | OUTPATIENT
Start: 2021-04-01 | End: 2021-04-01 | Stop reason: HOSPADM

## 2021-04-01 RX ADMIN — Medication 500 UNITS: at 12:03

## 2021-04-01 RX ADMIN — SODIUM CHLORIDE, PRESERVATIVE FREE 10 ML: 5 INJECTION INTRAVENOUS at 12:03

## 2021-04-01 NOTE — ANESTHESIA PROCEDURE NOTES
Airway  Urgency: elective    Date/Time: 8/3/2020 7:35 AM  Airway not difficult    General Information and Staff    Patient location during procedure: OR  Anesthesiologist: Ravi Bryan MD  CRNA: Deja Tesfaye CRNA    Indications and Patient Condition  Indications for airway management: airway protection    Preoxygenated: yes  MILS not maintained throughout  Mask difficulty assessment: 1 - vent by mask    Final Airway Details  Final airway type: endotracheal airway      Successful airway: ETT  Cuffed: yes   Successful intubation technique: direct laryngoscopy  Endotracheal tube insertion site: oral  Blade: Luis Antonio  Blade size: 3  ETT size (mm): 7.0  Cormack-Lehane Classification: grade I - full view of glottis  Placement verified by: chest auscultation and capnometry   Measured from: lips  ETT/EBT  to lips (cm): 20  Number of attempts at approach: 1  Assessment: lips, teeth, and gum same as pre-op and atraumatic intubation    Additional Comments  Dentition intact and unchanged. CBEBS.  +ETCO2.             Tetracycline Pregnancy And Lactation Text: This medication is Pregnancy Category D and not consider safe during pregnancy. It is also excreted in breast milk.

## 2021-04-06 ENCOUNTER — HOSPITAL ENCOUNTER (OUTPATIENT)
Dept: PET IMAGING | Facility: HOSPITAL | Age: 41
Discharge: HOME | End: 2021-04-06

## 2021-04-06 ENCOUNTER — OFFICE VISIT (OUTPATIENT)
Dept: CARDIOLOGY | Facility: CLINIC | Age: 41
End: 2021-04-06

## 2021-04-06 VITALS
HEIGHT: 66 IN | SYSTOLIC BLOOD PRESSURE: 136 MMHG | WEIGHT: 202 LBS | BODY MASS INDEX: 32.47 KG/M2 | DIASTOLIC BLOOD PRESSURE: 74 MMHG | HEART RATE: 115 BPM

## 2021-04-06 DIAGNOSIS — C78.00 MALIGNANT NEOPLASM METASTATIC TO LUNG, UNSPECIFIED LATERALITY (HCC): ICD-10-CM

## 2021-04-06 DIAGNOSIS — C20 ADENOCARCINOMA OF RECTUM (HCC): Chronic | ICD-10-CM

## 2021-04-06 DIAGNOSIS — C20 ADENOCARCINOMA OF RECTUM (HCC): Primary | ICD-10-CM

## 2021-04-06 DIAGNOSIS — Z09 CHEMOTHERAPY FOLLOW-UP EXAMINATION: ICD-10-CM

## 2021-04-06 LAB — GLUCOSE BLDC GLUCOMTR-MCNC: 112 MG/DL (ref 70–130)

## 2021-04-06 PROCEDURE — 78815 PET IMAGE W/CT SKULL-THIGH: CPT

## 2021-04-06 PROCEDURE — 82962 GLUCOSE BLOOD TEST: CPT

## 2021-04-06 PROCEDURE — 93000 ELECTROCARDIOGRAM COMPLETE: CPT | Performed by: INTERNAL MEDICINE

## 2021-04-06 PROCEDURE — A9552 F18 FDG: HCPCS | Performed by: INTERNAL MEDICINE

## 2021-04-06 PROCEDURE — 0 FLUDEOXYGLUCOSE F18 SOLUTION: Performed by: INTERNAL MEDICINE

## 2021-04-06 PROCEDURE — 99204 OFFICE O/P NEW MOD 45 MIN: CPT | Performed by: INTERNAL MEDICINE

## 2021-04-06 RX ADMIN — FLUDEOXYGLUCOSE F18 1 DOSE: 300 INJECTION INTRAVENOUS at 07:20

## 2021-04-06 NOTE — PROGRESS NOTES
Date of Office Visit: 2021  Encounter Provider: Tasha Bustos MD  Place of Service: [facility] CARDIOLOGY  Patient Name: Carole Weaver  :1980      Patient ID:  Carole Weaver is a 40 y.o. female is here for metastatic colon cancer to the liver.           History of Present Illness    She has a history of primary rectal cancer which was surgically resected by Dr. Ольга Ye 2019.  She was started on FOLFOX 6 (leucovorin, fluorouracil and oxaliplatin) 2020 followed by Dr Nguyen.  Follow-up PET scan 2020 showed no metastatic disease in the chest abdomen and pelvis but a stable 6 mm right upper lung pulmonary nodule.  She had a low anterior colon resection with coloanal anastomosis done 8/3/2020.  On 2020, there is a new 8 mm noncalcified pulmonary nodule in the left lower lobe of the lung.  The 6 mm prior pulmonary nodule stable.  PET scan on 2020 showed multiple hypermetabolic small pulmonary nodules.  She was started on pelvic chemotherapy with FOLFIRI (leucovorin, fluorouracil and irinotecan) on 10/13/2020.  She developed peripheral neuropathy and so irinotecan was stopped but then resumed.  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  She had telemedicine visit with [facility] 2021.  They evaluated her chemotherapeutic plan and agreed with treatment for palliative chemotherapy followed by maintenance chemotherapy.  She is preparing for the 12th cycle of FOLFIRI and bevacizumab.     She is heterozygous for factor V Leiden and has been on prophylactic anticoagulation with Lovenox.  She had 3 pulmonary emboli diagnosed 2019 at that time she was on low-dose Lovenox.  She was then placed on Xarelto which was then made subtherapeutic as they lowered the dose.  She then developed SVC and left brachiocephalic thrombus  and was switched to weight-based Lovenox every 12 hours.    I did review a CTA from 12/2020.  There is no coronary artery calcification.    He is , has 1 child is 22, works as an  and is still working full-time.  She is smoking half pack cigarettes a day uses no alcohol or drugs and has rare caffeine.    Her father has atrial fibrillation, mother has hypertension.  There is no family history of premature cancer.    She has no exertional chest tightness but does get chest tightness which she thinks is due to severe reflux.  Tends to occur at different times and is relieved with Protonix and Pepcid.  She has no orthopnea or PND.  She has no exertional dyspnea seems unusual.  She does not feel her heart racing or skipping and she is had no syncope.  She has had no further blood clots on Lovenox.    Past Medical History:   Diagnosis Date   • Abdominopelvic abscess (CMS/[facility]) 08/12/2020    ADMITTED TO [facility]   • Abnormal Pap smear of cervix 02/02/1998    JULIUS I   • Anemia in pregnancy    • Anxiety    • Bilateral epiphora 04/2020   • Bilateral hand swelling 05/02/2020    SEEN AT [facility] ER   • Cervical lymphadenitis 06/06/2012    SEEN AT [facility] ER   • Chemotherapy induced neutropenia (CMS/HCC) 04/2020   • Chemotherapy-induced nausea 02/2020   • Chemotherapy-induced thrombocytopenia 05/2020   • Chronic diarrhea    • Colon polyps     FOLLOWED BY DR. GERONIMO ESPARZA   • Cystitis 04/24/2020    WITH DEHYDRATION, ADMITTED TO [facility]   • Cystitis 10/27/2020   • Drug-induced peripheral neuropathy (CMS/[facility]) 05/2020   • Fistula of intestine    • GERD (gastroesophageal reflux disease)    • Hand foot syndrome 05/2020   • Heart murmur     IN CHILDHOOD   • Hematochezia    • Hemorrhoids    • Heterozygous factor V Leiden mutation (CMS/[facility])     DX 8-2-2019   • History of anemia    • History of chemotherapy 2019    FOLLOWED BY DR. ALEXANDRU HUNT   • History of gestational diabetes    • History of pre-eclampsia    • History of radiation  therapy     FOLLOWED BY DR. JAVON LEWIS   • History of TB skin testing 2009    TB Skin Test   • HPV in female    • Hypokalemia 2019   • Hypomagnesemia 2019   • IBS (irritable bowel syndrome)    • Ileostomy in place (CMS/[facility])     FOLLOWED BY DR. PADMAJA ESPARZA   • Infected insect bite of neck 2016    SEEN AT [facility]   • Kidney stone    • Kidney stones 2007    SEEN AT [facility] ER   • Lump of right breast     SWOLLEN LYMPH NODE   • Lung cancer (CMS/HCC) 2020    METASTATIC LUNG CANCER   • Monopolar depression (CMS/HCC)    • On anticoagulant therapy    • Perirectal abscess 2020   • PIH (pregnancy induced hypertension)    • Pulmonary embolism without acute cor pulmonale (CMS/HCC) 09/20/2019    X 3; ADMITTED TO [facility]   • Pulmonary nodule, right 2020   • Rectal cancer (CMS/HCC) 2019    STAGE IIA, INVASIVE MODERATELY DIFFERENTIATED ADENOCARCINOMA, CHEMO AND XRT FINISHED 2019   • Right shoulder pain 2020    SEEN AT [facility] ER   • Right ureteral stone 10/01/2019    SEEN AT [facility] ER   • SAB (spontaneous ) 1996     A2-1 INDUCED   • Sciatica    • Sepsis due to cellulitis (CMS/HCC) 2002    LEFT GREAT TOE, ADMITTED TO [facility]   • Tachycardia 2020   • Urinary urgency 2020         Past Surgical History:   Procedure Laterality Date   • CHOLECYSTECTOMY N/A 10/10/2003    LAPAROSCOPIC WITH CHOLANGIOGRAM, DR. JAMEY TALAVERA AT [facility]   • COLON RESECTION N/A 2019    Procedure: laparoscopic low anterior colon resection with mobilization of splenic flexure and diverting loop ileostomy: ERAS;  Surgeon: Padmaja Esparza MD;  Location: [facility] OR;  Service: General   • COLON RESECTION N/A 8/3/2020    Procedure: LOW ANTERIOR COLON RESECTION, COLOANAL ANASTOMOSIS, MOBILIZATION SPLENIC FLEXURE;  Surgeon: Padmaja Esparza MD;  Location: [facility] MAIN OR;  Service: General;  Laterality: N/A;   • COLONOSCOPY N/A 7/15/2020    PATENT ANASTAMOSIS IN MID RECTUM,  RESCOPE IN 1 YR, DR. GERONIMO YE AT [facility]   • COLONOSCOPY W/ POLYPECTOMY N/A 07/08/2019    15 MM TUBULOVILLOUS ADENOMA POLYP IN HEPATIC FLEXURE, 20 MMTUBULOVILLOUS ADENOMA WITH HIGH GRADE DYSPLASIA IN RECTOSIGMOID, 4 CM MASS IN MID RECTUM, PATH: INVASIVE MODERATELY DIFFERENTIATED ADENOCARCINOMA, DR. JENNIFER LI AT [facility]   • DILATATION AND EVACUATION N/A 2009   • ENDOSCOPY N/A 07/08/2019    LA GRADE A ESOPHAGITIS, GASTRITIS, ALL BIOPSIES BENIGN, DR. JENNIFER LI AT [facility]   • INCISION AND DRAINAGE PERIRECTAL ABSCESS N/A 8/14/2020    Procedure: INCISION AND DRAINAGE OF retrorectal dehiscence abcess with drain placement and irrigation;  Surgeon: Geronimo Ye MD;  Location: [facility] MAIN OR;  Service: General;  Laterality: N/A;   • PAP SMEAR N/A 01/24/2014   • SIGMOIDOSCOPY N/A 7/24/2019    INFILTRATIVE PARTIALLY OBSTRUCTING LARGE RECTAL CANCER, AREA TATOOED, DR. GERONIMO YE AT [facility]   • SIGMOIDOSCOPY N/A 11/23/2019    AN ULCERATED PARTIALLY OBSTRUCTING MASS IN MID RECTUM, AREA TATTOOED, DR. GERONIMO YE AT [facility]   • TRANSRECTAL ULTRASOUND N/A 7/24/2019    Procedure: ULTRASOUND TRANSRECTAL;  Surgeon: Geronimo Ye MD;  Location: [facility] ENDOSCOPY;  Service: General   • URETEROSCOPY LASER LITHOTRIPSY WITH STENT INSERTION Right 10/30/2019    DR. ESTUARDO BEASLEY AT [place]   • VAGINAL DELIVERY N/A 09/18/1998   • VENOUS ACCESS DEVICE (PORT) INSERTION Left 8/9/2019    Procedure: INSERTION VENOUS ACCESS DEVICE;  Surgeon: Sj Joseph MD;  [place] [facility] [place] [facility]  Service: General   • VENOUS ACCESS DEVICE (PORT) INSERTION N/A 12/18/2020    Procedure: INSERTION VENOUS ACCESS DEVICE right side, removal venous access device left side;  Surgeon: Sj Joseph MD;  [place] [facility] [place] [facility] [place]  Service: General;  Laterality: N/A;   • WISDOM TOOTH EXTRACTION Bilateral 1993       Current Outpatient Medications on File Prior to Visit   Medication Sig Dispense Refill   • clonazePAM (KlonoPIN) 1 MG  tablet TAKE 1 TABLET BY MOUTH THREE TIMES A DAY AS NEEDED FOR ANXIETY OR SEIZURES 90 tablet 0   • Diclofenac Sodium (VOLTAREN) 1 % gel gel Apply 4 g topically to the appropriate area as directed 3 (Three) Times a Day. 150 g 3   • dicyclomine (BENTYL) 20 MG tablet TAKE 1 TABLET BY MOUTH EVERY 6 HOURS AS NEEDED 360 tablet 0   • diphenoxylate-atropine (LOMOTIL) 2.5-0.025 MG per tablet TAKE 1 TABLET BY MOUTH 4 (FOUR) TIMES A DAY AS NEEDED FOR DIARRHEA. 120 tablet 1   • enoxaparin (Lovenox) 100 MG/ML solution syringe Inject 1 mL under the skin into the appropriate area as directed Every 12 (Twelve) Hours. 60 mL 2   • escitalopram (LEXAPRO) 10 MG tablet TAKE 1 TABLET BY MOUTH EVERY DAY 30 tablet 5   • famotidine (PEPCID) 20 MG tablet TAKE 1 TABLET BY MOUTH TWICE A  tablet 1   • HYDROcodone-acetaminophen (NORCO) 7.5-325 MG per tablet Take 1 tablet by mouth Every 6 (Six) Hours As Needed for Moderate Pain . 240 tablet 0   • Loratadine (CLARITIN) 10 MG capsule Take 10 mg by mouth Every Evening.     • ondansetron (ZOFRAN) 8 MG tablet Take 1 tablet by mouth 3 (Three) Times a Day As Needed for Nausea or Vomiting. 60 tablet 5   • prochlorperazine (COMPAZINE) 10 MG tablet Take 1 tablet by mouth Every 6 (Six) Hours As Needed for Nausea or Vomiting. 30 tablet 2   • promethazine (PHENERGAN) 25 MG tablet Take 1 tablet by mouth Every 6 (Six) Hours As Needed for Nausea or Vomiting. 60 tablet 2     Current Facility-Administered Medications on File Prior to Visit   Medication Dose Route Frequency Provider Last Rate Last Admin   • [COMPLETED] fludeoxyglucose F18 injection 1 dose  1 dose Intravenous Once in imaging Dong Nguyen MD PhD   1 dose at 04/06/21 0720       Social History     Socioeconomic History   • Marital status:      Spouse name: Justus   • Number of children: 1   • Years of education: College   • Highest education level: Not on file   Tobacco Use   • Smoking status: Current Every Day Smoker     Packs/day:  "0.50     Years: 24.00     Pack years: 12.00     Types: Electronic Cigarette, Cigarettes   • Smokeless tobacco: Never Used   Vaping Use   • Vaping Use: Some days   • Substances: Nicotine   • Devices: Disposable   Substance and Sexual Activity   • Alcohol use: Not Currently     Comment: Caffeine use rare   • Drug use: No   • Sexual activity: Defer           ROS    Procedures    ECG 12 Lead    Date/Time: 4/6/2021 12:47 PM  Performed by: Tasha Bustos MD  Authorized by: Tasha Bustos MD   Comparison: compared with previous ECG   Similar to previous ECG  Rhythm: sinus tachycardia    Clinical impression: normal ECG                Objective:      Vitals:    04/06/21 1234 04/06/21 1236   BP: 140/80 136/74   BP Location: Left arm Right arm   Pulse: 115    Weight: 91.6 kg (202 lb)    Height: 166.4 cm (65.5\")      Body mass index is 33.1 kg/m².    Vitals reviewed.   Constitutional:       General: Not in acute distress.     Appearance: Well-developed. Not diaphoretic.   Eyes:      General: No scleral icterus.     Conjunctiva/sclera: Conjunctivae normal.   HENT:      Head: Normocephalic and atraumatic.   Neck:      Thyroid: No thyromegaly.      Vascular: No carotid bruit or JVD.      Lymphadenopathy: No cervical adenopathy.   Pulmonary:      Effort: Pulmonary effort is normal. No respiratory distress.      Breath sounds: Normal breath sounds. No wheezing. No rhonchi. No rales.   Chest:      Chest wall: Not tender to palpatation.   Cardiovascular:      Normal rate. Regular rhythm.      No gallop.   Pulses:     Intact distal pulses.   Edema:     Peripheral edema absent.   Abdominal:      General: Bowel sounds are normal. There is no distension or abdominal bruit.      Palpations: Abdomen is soft. There is no abdominal mass.      Tenderness: There is no abdominal tenderness.   Musculoskeletal:         General: No deformity.      Extremities: No clubbing present.     Cervical back: Neck supple. Skin:     General: " Skin is warm and dry. There is no cyanosis.      Coloration: Skin is not pale.      Findings: No rash.   Neurological:      Mental Status: Alert and oriented to person, place, and time.      Cranial Nerves: No cranial nerve deficit.   Psychiatric:         Judgment: Judgment normal.         Lab Review:       Assessment:      Diagnosis Plan   1. Adenocarcinoma of rectum (CMS/HCC)  Adult Transthoracic Echo Complete W/ Cont if Necessary Per Protocol    Troponin    proBNP    Lipid Panel   2. Chemotherapy follow-up examination  Adult Transthoracic Echo Complete W/ Cont if Necessary Per Protocol    Troponin    proBNP    Lipid Panel     1. Adenocarcinoma of the rectum, chemotherapeutics as noted above.  The bevacizumab is a monoclonal antibody.  The fluorouracil as an antimetabolite.  The oxaliplatin is platinum chemotherapeutic.  These 3 in particular can increase risk of coronary artery disease, myocardial infarction and cardiomyopathy.  2. Factor V Leiden with history of DVTs and pulmonary emboli, on therapeutic Lovenox.  3. Tobacco use.     Plan:       See back in 3 months, no med changes.  Set up testing.

## 2021-04-08 ENCOUNTER — TELEPHONE (OUTPATIENT)
Dept: CARDIOLOGY | Facility: CLINIC | Age: 41
End: 2021-04-08

## 2021-04-08 ENCOUNTER — HOSPITAL ENCOUNTER (OUTPATIENT)
Dept: CARDIOLOGY | Facility: HOSPITAL | Age: 41
Discharge: HOME | End: 2021-04-08
Admitting: INTERNAL MEDICINE

## 2021-04-08 VITALS — WEIGHT: 202 LBS | BODY MASS INDEX: 33.66 KG/M2 | HEIGHT: 65 IN | HEART RATE: 103 BPM

## 2021-04-08 DIAGNOSIS — C20 ADENOCARCINOMA OF RECTUM (HCC): ICD-10-CM

## 2021-04-08 DIAGNOSIS — Z09 CHEMOTHERAPY FOLLOW-UP EXAMINATION: ICD-10-CM

## 2021-04-08 LAB
AORTIC ARCH: 2.5 CM
ASCENDING AORTA: 2.5 CM
BH CV ECHO MEAS - ACS: 1.7 CM
BH CV ECHO MEAS - AO ARCH DIAM (PROXIMAL TRANS.): 2.5 CM
BH CV ECHO MEAS - AO MAX PG (FULL): 2.3 MMHG
BH CV ECHO MEAS - AO MAX PG: 6.4 MMHG
BH CV ECHO MEAS - AO MEAN PG (FULL): 1 MMHG
BH CV ECHO MEAS - AO MEAN PG: 4 MMHG
BH CV ECHO MEAS - AO ROOT AREA (BSA CORRECTED): 1.5
BH CV ECHO MEAS - AO ROOT AREA: 6.6 CM^2
BH CV ECHO MEAS - AO ROOT DIAM: 2.9 CM
BH CV ECHO MEAS - AO V2 MAX: 126 CM/SEC
BH CV ECHO MEAS - AO V2 MEAN: 91.4 CM/SEC
BH CV ECHO MEAS - AO V2 VTI: 18.5 CM
BH CV ECHO MEAS - ASC AORTA: 2.5 CM
BH CV ECHO MEAS - AVA(I,A): 1.3 CM^2
BH CV ECHO MEAS - AVA(I,D): 1.3 CM^2
BH CV ECHO MEAS - AVA(V,A): 1.6 CM^2
BH CV ECHO MEAS - AVA(V,D): 1.6 CM^2
BH CV ECHO MEAS - BSA(HAYCOCK): 2.1 M^2
BH CV ECHO MEAS - BSA: 2 M^2
BH CV ECHO MEAS - BZI_BMI: 33.6 KILOGRAMS/M^2
BH CV ECHO MEAS - BZI_METRIC_HEIGHT: 165.1 CM
BH CV ECHO MEAS - BZI_METRIC_WEIGHT: 91.6 KG
BH CV ECHO MEAS - EDV(CUBED): 54.9 ML
BH CV ECHO MEAS - EDV(MOD-SP2): 75 ML
BH CV ECHO MEAS - EDV(MOD-SP4): 60 ML
BH CV ECHO MEAS - EDV(TEICH): 62 ML
BH CV ECHO MEAS - EF(CUBED): 55.6 %
BH CV ECHO MEAS - EF(MOD-BP): 50 %
BH CV ECHO MEAS - EF(MOD-SP2): 50.7 %
BH CV ECHO MEAS - EF(MOD-SP4): 43.3 %
BH CV ECHO MEAS - EF(TEICH): 48 %
BH CV ECHO MEAS - ESV(CUBED): 24.4 ML
BH CV ECHO MEAS - ESV(MOD-SP2): 37 ML
BH CV ECHO MEAS - ESV(MOD-SP4): 34 ML
BH CV ECHO MEAS - ESV(TEICH): 32.2 ML
BH CV ECHO MEAS - FS: 23.7 %
BH CV ECHO MEAS - IVS/LVPW: 0.91
BH CV ECHO MEAS - IVSD: 1 CM
BH CV ECHO MEAS - LV DIASTOLIC VOL/BSA (35-75): 30.2 ML/M^2
BH CV ECHO MEAS - LV MASS(C)D: 125.8 GRAMS
BH CV ECHO MEAS - LV MASS(C)DI: 63.3 GRAMS/M^2
BH CV ECHO MEAS - LV MAX PG: 4.1 MMHG
BH CV ECHO MEAS - LV MEAN PG: 3 MMHG
BH CV ECHO MEAS - LV SYSTOLIC VOL/BSA (12-30): 17.1 ML/M^2
BH CV ECHO MEAS - LV V1 MAX: 101 CM/SEC
BH CV ECHO MEAS - LV V1 MEAN: 76.6 CM/SEC
BH CV ECHO MEAS - LV V1 VTI: 12.4 CM
BH CV ECHO MEAS - LVIDD: 3.8 CM
BH CV ECHO MEAS - LVIDS: 2.9 CM
BH CV ECHO MEAS - LVLD AP2: 6.5 CM
BH CV ECHO MEAS - LVLD AP4: 6.3 CM
BH CV ECHO MEAS - LVLS AP2: 5.6 CM
BH CV ECHO MEAS - LVLS AP4: 5.6 CM
BH CV ECHO MEAS - LVOT AREA (M): 2 CM^2
BH CV ECHO MEAS - LVOT AREA: 2 CM^2
BH CV ECHO MEAS - LVOT DIAM: 1.6 CM
BH CV ECHO MEAS - LVPWD: 1.1 CM
BH CV ECHO MEAS - MV A DUR: 0.1 SEC
BH CV ECHO MEAS - MV A MAX VEL: 93.4 CM/SEC
BH CV ECHO MEAS - MV E MAX VEL: 52.3 CM/SEC
BH CV ECHO MEAS - MV E/A: 0.56
BH CV ECHO MEAS - MV MAX PG: 4.2 MMHG
BH CV ECHO MEAS - MV MEAN PG: 2 MMHG
BH CV ECHO MEAS - MV V2 MAX: 102 CM/SEC
BH CV ECHO MEAS - MV V2 MEAN: 60.6 CM/SEC
BH CV ECHO MEAS - MV V2 VTI: 10.9 CM
BH CV ECHO MEAS - MVA(VTI): 2.3 CM^2
BH CV ECHO MEAS - PA ACC TIME: 0.1 SEC
BH CV ECHO MEAS - PA MAX PG (FULL): 1.2 MMHG
BH CV ECHO MEAS - PA MAX PG: 3.3 MMHG
BH CV ECHO MEAS - PA PR(ACCEL): 36.3 MMHG
BH CV ECHO MEAS - PA V2 MAX: 90.9 CM/SEC
BH CV ECHO MEAS - PVA(V,A): 1.4 CM^2
BH CV ECHO MEAS - PVA(V,D): 1.4 CM^2
BH CV ECHO MEAS - QP/QS: 0.74
BH CV ECHO MEAS - RV MAX PG: 2.1 MMHG
BH CV ECHO MEAS - RV MEAN PG: 1 MMHG
BH CV ECHO MEAS - RV V1 MAX: 73.1 CM/SEC
BH CV ECHO MEAS - RV V1 MEAN: 55.1 CM/SEC
BH CV ECHO MEAS - RV V1 VTI: 10.4 CM
BH CV ECHO MEAS - RVOT AREA: 1.8 CM^2
BH CV ECHO MEAS - RVOT DIAM: 1.5 CM
BH CV ECHO MEAS - SI(AO): 61.5 ML/M^2
BH CV ECHO MEAS - SI(CUBED): 15.3 ML/M^2
BH CV ECHO MEAS - SI(LVOT): 12.6 ML/M^2
BH CV ECHO MEAS - SI(MOD-SP2): 19.1 ML/M^2
BH CV ECHO MEAS - SI(MOD-SP4): 13.1 ML/M^2
BH CV ECHO MEAS - SI(TEICH): 15 ML/M^2
BH CV ECHO MEAS - SUP REN AO DIAM: 2.2 CM
BH CV ECHO MEAS - SV(AO): 122.2 ML
BH CV ECHO MEAS - SV(CUBED): 30.5 ML
BH CV ECHO MEAS - SV(LVOT): 24.9 ML
BH CV ECHO MEAS - SV(MOD-SP2): 38 ML
BH CV ECHO MEAS - SV(MOD-SP4): 26 ML
BH CV ECHO MEAS - SV(RVOT): 18.4 ML
BH CV ECHO MEAS - SV(TEICH): 29.7 ML
BH CV XLRA - RV BASE: 1.9 CM
BH CV XLRA - RV LENGTH: 4.9 CM
BH CV XLRA - RV MID: 1.6 CM
LEFT ATRIUM VOLUME INDEX: 10 ML/M2
MAXIMAL PREDICTED HEART RATE: 179 BPM
SINUS: 2.6 CM
STJ: 2.4 CM
STRESS TARGET HR: 152 BPM

## 2021-04-08 PROCEDURE — 93356 MYOCRD STRAIN IMG SPCKL TRCK: CPT | Performed by: INTERNAL MEDICINE

## 2021-04-08 PROCEDURE — 93356 MYOCRD STRAIN IMG SPCKL TRCK: CPT

## 2021-04-08 PROCEDURE — 93306 TTE W/DOPPLER COMPLETE: CPT | Performed by: INTERNAL MEDICINE

## 2021-04-08 PROCEDURE — 25010000002 PERFLUTREN (DEFINITY) 8.476 MG IN SODIUM CHLORIDE (PF) 0.9 % 10 ML INJECTION: Performed by: INTERNAL MEDICINE

## 2021-04-08 PROCEDURE — 93306 TTE W/DOPPLER COMPLETE: CPT

## 2021-04-08 RX ADMIN — PERFLUTREN 1.5 ML: 6.52 INJECTION, SUSPENSION INTRAVENOUS at 14:17

## 2021-04-12 ENCOUNTER — OFFICE VISIT (OUTPATIENT)
Dept: INTERNAL MEDICINE | Facility: CLINIC | Age: 41
End: 2021-04-12

## 2021-04-12 VITALS
RESPIRATION RATE: 16 BRPM | SYSTOLIC BLOOD PRESSURE: 102 MMHG | HEART RATE: 119 BPM | HEIGHT: 65 IN | WEIGHT: 200.4 LBS | OXYGEN SATURATION: 98 % | DIASTOLIC BLOOD PRESSURE: 68 MMHG | TEMPERATURE: 97.5 F | BODY MASS INDEX: 33.39 KG/M2

## 2021-04-12 DIAGNOSIS — F41.9 ANXIETY: Chronic | ICD-10-CM

## 2021-04-12 DIAGNOSIS — D68.51 HETEROZYGOUS FACTOR V LEIDEN MUTATION (HCC): Chronic | ICD-10-CM

## 2021-04-12 DIAGNOSIS — I82.210 THROMBOSIS OF SUPERIOR VENA CAVA (HCC): ICD-10-CM

## 2021-04-12 DIAGNOSIS — F33.9 MONOPOLAR DEPRESSION (HCC): Chronic | ICD-10-CM

## 2021-04-12 DIAGNOSIS — R00.0 TACHYCARDIA: Primary | Chronic | ICD-10-CM

## 2021-04-12 DIAGNOSIS — C20 ADENOCARCINOMA OF RECTUM (HCC): Chronic | ICD-10-CM

## 2021-04-12 DIAGNOSIS — Z79.01 CHRONIC ANTICOAGULATION: Chronic | ICD-10-CM

## 2021-04-12 PROCEDURE — 99213 OFFICE O/P EST LOW 20 MIN: CPT | Performed by: NURSE PRACTITIONER

## 2021-04-12 NOTE — PROGRESS NOTES
Chief Complaint  Depression (3 month follow up )     Subjective:      History of Present Illness {CC  Problem List  Visit  Diagnosis   Encounters  Notes  Medications  Labs  Result Review Imaging  Media :23}     Carole Weaver presents to [facility] for chronic medical conditions including: Rectal cancer, peripheral neuropathy, chronic anticoagulation (hetero Factor V Leiden), hypertension, anxiety, depression    Since last visit she had a televisit with [facility] in February.  She had them to reevaluate her chemotherapy plan and they agreed with current palliative chemo followed by maintenance chemotherapy.  She has finished her 12th cycle of FOLFIRI and bevacizumab.   She was seen by Dr. Bustos in cardiology due to tachycardia..  Echo was ordered due to increased risk of coronary artery disease, myocardial infarction and cardiomyopathy from chemotherapeutics.  She had echo on April 8 -left ventricular diastolic function is consistent with grade 1 impaired relaxation, EF equals 50%, left ventricular wall segments are hypokinetic.   Adult Transthoracic Echo Complete W/ Cont if Necessary Per Protocol (04/08/2021 2:16 PM)    Bone scan done 4/6/21: per report  IMPRESSION:  Marginal decrease in size of the 5 mm right upper lobe  pulmonary nodule. There is no hypermetabolic lymphadenopathy within the  chest. Stable low-level activity in the presacral soft tissue  thickening. There is no hypermetabolic lymphadenopathy within the  abdomen or pelvis or evidence for new metastatic disease.    She is waiting to see when she will start maintenance dose chemo.     She is still smoking - she states if she is told she is stable on going on maintenance dose, she will quit. She states her mood is better on lexapro.  She is no longer crying as she was.  Still up and down at times.  She is still working.  Use of klonopin is much less.     Pain is  controlled. Her weight is stable.     She wants to do a family  trip to [place] - we discussed that either way her treatment plan goes, she should make her plans.        Objective:      Physical Exam  Vitals reviewed.   Constitutional:       Appearance: She is well-developed.   HENT:      Head:      Comments: Wearing mask due to COVID   Neck:      Thyroid: No thyromegaly.   Cardiovascular:      Rate and Rhythm: Regular rhythm. Tachycardia present.      Pulses: Normal pulses.      Heart sounds: Normal heart sounds.   Pulmonary:      Effort: Pulmonary effort is normal.      Breath sounds: Normal breath sounds. No wheezing.      Comments: E/U   Abdominal:      General: Bowel sounds are normal.      Palpations: Abdomen is soft.   Skin:     General: Skin is warm and dry.      Capillary Refill: Capillary refill takes 2 to 3 seconds.   Neurological:      Mental Status: She is alert and oriented to person, place, and time.   Psychiatric:         Behavior: Behavior normal. Behavior is cooperative.         Thought Content: Thought content normal.         Judgment: Judgment normal.        Result Review  Data Reviewed:{ Labs  Result Review  Imaging  Med Tab  Media :23}   The following data was reviewed by: Deepika Vela III, NP-C on 04/12/2021  Lab Results - Last 18 Months   Lab Units 03/30/21  0945 03/16/21  1133 03/16/21  0936 03/02/21  0835 12/21/20  1123 12/21/20  1038 11/16/20  0903 09/09/20  0812 08/12/20  1758   BUN mg/dL 10 10  --  8   < >  --  9  --  5*   CREATININE mg/dL 0.85 0.89  --  0.90   < >  --  0.70   < > 0.59   EGFR IF NONAFRICN AM mL/min/1.73 74 70  --  69   < >  --  92  --  112   SODIUM mmol/L 137 137  --  136   < >  --  135*  --  132*   POTASSIUM mmol/L 4.0 4.1  --  4.2   < >  --  4.3  --  3.5   CHLORIDE mmol/L 103 102  --  102   < >  --  103  --  97*   CALCIUM mg/dL 9.3 9.5  --  9.5   < >  --  8.8  --  8.4*   ALBUMIN g/dL 3.80 3.90  --  3.90   < >  --  3.70  --  2.90*   BILIRUBIN mg/dL 0.3  "0.3  --  0.3   < >  --  <0.2  --  0.4   ALK PHOS U/L 109 114  --  121*   < >  --  115  --  96   AST (SGOT) U/L 26 38*  --  29   < >  --  14  --  17   ALT (SGPT) U/L 46* 77*  --  59*   < >  --  30  --  19   WBC 10*3/mm3 3.95  --  3.93 4.25  --   --  2.98*  --  12.38*   RBC 10*6/mm3 4.79  --  4.81 4.82  --   --  4.24  --  3.32*   HEMATOCRIT % 47.5*  --  47.6* 47.8*  --   --  38.0  --  32.5*   MCV fL 99.2*  --  99.0* 99.2*  --   --  89.6  --  97.9*   MCH pg 32.8  --  34.3* 33.6*  --   --  30.2  --  32.2   INR   --   --   --   --   --  1.40* 1.51*  --  1.38*    < > = values in this interval not displayed.          Vital Signs:   /68 (BP Location: Left arm, Patient Position: Sitting, Cuff Size: Adult)   Pulse 119   Temp 97.5 °F (36.4 °C) (Temporal)   Resp 16   Ht 165.1 cm (65\")   Wt 90.9 kg (200 lb 6.4 oz)   SpO2 98%   BMI 33.35 kg/m²         Requested Prescriptions      No prescriptions requested or ordered in this encounter     Routine medications provided by this office will also be refilled via pharmacy request.       Current Outpatient Medications:   •  clonazePAM (KlonoPIN) 1 MG tablet, TAKE 1 TABLET BY MOUTH THREE TIMES A DAY AS NEEDED FOR ANXIETY OR SEIZURES, Disp: 90 tablet, Rfl: 0  •  dicyclomine (BENTYL) 20 MG tablet, TAKE 1 TABLET BY MOUTH EVERY 6 HOURS AS NEEDED, Disp: 360 tablet, Rfl: 0  •  diphenoxylate-atropine (LOMOTIL) 2.5-0.025 MG per tablet, TAKE 1 TABLET BY MOUTH 4 (FOUR) TIMES A DAY AS NEEDED FOR DIARRHEA., Disp: 120 tablet, Rfl: 1  •  enoxaparin (Lovenox) 100 MG/ML solution syringe, Inject 1 mL under the skin into the appropriate area as directed Every 12 (Twelve) Hours., Disp: 60 mL, Rfl: 2  •  escitalopram (LEXAPRO) 10 MG tablet, TAKE 1 TABLET BY MOUTH EVERY DAY, Disp: 30 tablet, Rfl: 5  •  famotidine (PEPCID) 20 MG tablet, TAKE 1 TABLET BY MOUTH TWICE A DAY, Disp: 180 tablet, Rfl: 1  •  Loratadine (CLARITIN) 10 MG capsule, Take 10 mg by mouth Every Evening., Disp: , Rfl:   •  " ondansetron (ZOFRAN) 8 MG tablet, Take 1 tablet by mouth 3 (Three) Times a Day As Needed for Nausea or Vomiting., Disp: 60 tablet, Rfl: 5  •  prochlorperazine (COMPAZINE) 10 MG tablet, Take 1 tablet by mouth Every 6 (Six) Hours As Needed for Nausea or Vomiting., Disp: 30 tablet, Rfl: 2  •  promethazine (PHENERGAN) 25 MG tablet, Take 1 tablet by mouth Every 6 (Six) Hours As Needed for Nausea or Vomiting., Disp: 60 tablet, Rfl: 2     Assessment and Plan:      Assessment and Plan {CC Problem List  Visit Diagnosis  ROS  Review (Popup)  Health Maintenance  Quality  BestPractice  Medications  SmartSets  SnapShot Encounters  Media :23}     Problem List Items Addressed This Visit        Cardiac and Vasculature    Tachycardia - Primary (Chronic)       Coag and Thromboembolic    Chronic anticoagulation (Chronic)    Heterozygous factor V Leiden mutation (CMS/HCC) (Chronic)    Thrombosis of superior vena cava (CMS/HCC)       Hematology and Neoplasia    Adenocarcinoma of rectum (CMS/HCC) (Chronic)       Mental Health    Anxiety (Chronic)    Current Assessment & Plan     Stable on current medication          Monopolar depression (CMS/HCC) (Chronic)    Current Assessment & Plan     Patient's depression is recurrent and is mild without psychosis. Their depression is currently in partial remission and the condition is improving with treatment. This will be reassessed at the next regular appointment. F/U as described:patient will continue current medication therapy.               Follow Up {Instructions Charge Capture  Follow-up Communications :23}     Return in about 3 months (around 7/12/2021).    Patient was given instructions and counseling regarding her condition or for health maintenance advice. Please see specific information pulled into the AVS if appropriate.    Dragon disclaimer:   Much of this encounter note is an electronic transcription/translation of spoken language to printed text. The electronic  translation of spoken language may permit erroneous, or at times, nonsensical words or phrases to be inadvertently transcribed; Although I have reviewed the note for such errors, some may still exist.     Additional Patient Counseling:       There are no Patient Instructions on file for this visit.

## 2021-04-13 ENCOUNTER — OFFICE VISIT (OUTPATIENT)
Dept: ONCOLOGY | Facility: CLINIC | Age: 41
End: 2021-04-13

## 2021-04-13 ENCOUNTER — INFUSION (OUTPATIENT)
Dept: ONCOLOGY | Facility: HOSPITAL | Age: 41
End: 2021-04-13

## 2021-04-13 ENCOUNTER — TELEPHONE (OUTPATIENT)
Dept: CARDIOLOGY | Facility: CLINIC | Age: 41
End: 2021-04-13

## 2021-04-13 VITALS
HEART RATE: 105 BPM | HEIGHT: 65 IN | TEMPERATURE: 97.3 F | BODY MASS INDEX: 33.22 KG/M2 | SYSTOLIC BLOOD PRESSURE: 136 MMHG | OXYGEN SATURATION: 97 % | DIASTOLIC BLOOD PRESSURE: 92 MMHG | WEIGHT: 199.4 LBS | RESPIRATION RATE: 16 BRPM

## 2021-04-13 DIAGNOSIS — R30.0 DYSURIA: Primary | ICD-10-CM

## 2021-04-13 DIAGNOSIS — N30.00 ACUTE CYSTITIS WITHOUT HEMATURIA: ICD-10-CM

## 2021-04-13 DIAGNOSIS — D68.51 HETEROZYGOUS FACTOR V LEIDEN MUTATION (HCC): Chronic | ICD-10-CM

## 2021-04-13 DIAGNOSIS — G62.0 DRUG-INDUCED PERIPHERAL NEUROPATHY (HCC): ICD-10-CM

## 2021-04-13 DIAGNOSIS — Z79.01 CHRONIC ANTICOAGULATION: Chronic | ICD-10-CM

## 2021-04-13 DIAGNOSIS — C20 ADENOCARCINOMA OF RECTUM (HCC): ICD-10-CM

## 2021-04-13 DIAGNOSIS — I82.210 THROMBOSIS OF SUPERIOR VENA CAVA (HCC): ICD-10-CM

## 2021-04-13 DIAGNOSIS — C20 ADENOCARCINOMA OF RECTUM (HCC): Chronic | ICD-10-CM

## 2021-04-13 DIAGNOSIS — I26.99 OTHER PULMONARY EMBOLISM WITHOUT ACUTE COR PULMONALE, UNSPECIFIED CHRONICITY (HCC): ICD-10-CM

## 2021-04-13 DIAGNOSIS — R30.0 DYSURIA: ICD-10-CM

## 2021-04-13 DIAGNOSIS — Z09 CHEMOTHERAPY FOLLOW-UP EXAMINATION: ICD-10-CM

## 2021-04-13 DIAGNOSIS — C20 ADENOCARCINOMA OF RECTUM (HCC): Primary | ICD-10-CM

## 2021-04-13 DIAGNOSIS — C78.00 MALIGNANT NEOPLASM METASTATIC TO LUNG, UNSPECIFIED LATERALITY (HCC): ICD-10-CM

## 2021-04-13 LAB
ALBUMIN SERPL-MCNC: 4 G/DL (ref 3.5–5.2)
ALBUMIN/GLOB SERPL: 1.2 G/DL (ref 1.1–2.4)
ALP SERPL-CCNC: 121 U/L (ref 38–116)
ALT SERPL W P-5'-P-CCNC: 56 U/L (ref 0–33)
ANION GAP SERPL CALCULATED.3IONS-SCNC: 10.6 MMOL/L (ref 5–15)
AST SERPL-CCNC: 24 U/L (ref 0–32)
BASOPHILS # BLD AUTO: 0.01 10*3/MM3 (ref 0–0.2)
BASOPHILS NFR BLD AUTO: 0.2 % (ref 0–1.5)
BILIRUB SERPL-MCNC: 0.3 MG/DL (ref 0.2–1.2)
BILIRUB UR QL STRIP: ABNORMAL
BUN SERPL-MCNC: 9 MG/DL (ref 6–20)
BUN/CREAT SERPL: 10.6 (ref 7.3–30)
CALCIUM SPEC-SCNC: 9.7 MG/DL (ref 8.5–10.2)
CHLORIDE SERPL-SCNC: 102 MMOL/L (ref 98–107)
CHOLEST SERPL-MCNC: 164 MG/DL (ref 0–200)
CLARITY UR: CLEAR
CO2 SERPL-SCNC: 23.4 MMOL/L (ref 22–29)
COLOR UR: YELLOW
CREAT SERPL-MCNC: 0.85 MG/DL (ref 0.6–1.1)
DEPRECATED RDW RBC AUTO: 59.1 FL (ref 37–54)
EOSINOPHIL # BLD AUTO: 0.11 10*3/MM3 (ref 0–0.4)
EOSINOPHIL NFR BLD AUTO: 2.6 % (ref 0.3–6.2)
ERYTHROCYTE [DISTWIDTH] IN BLOOD BY AUTOMATED COUNT: 16.3 % (ref 12.3–15.4)
GFR SERPL CREATININE-BSD FRML MDRD: 74 ML/MIN/1.73
GLOBULIN UR ELPH-MCNC: 3.3 GM/DL (ref 1.8–3.5)
GLUCOSE SERPL-MCNC: 114 MG/DL (ref 74–124)
GLUCOSE UR STRIP-MCNC: NEGATIVE MG/DL
HCT VFR BLD AUTO: 46.9 % (ref 34–46.6)
HDLC SERPL-MCNC: 40 MG/DL (ref 40–60)
HGB BLD-MCNC: 15.7 G/DL (ref 12–15.9)
HGB UR QL STRIP.AUTO: ABNORMAL
IMM GRANULOCYTES # BLD AUTO: 0.01 10*3/MM3 (ref 0–0.05)
IMM GRANULOCYTES NFR BLD AUTO: 0.2 % (ref 0–0.5)
KETONES UR QL STRIP: NEGATIVE
LDLC SERPL CALC-MCNC: 92 MG/DL (ref 0–100)
LDLC/HDLC SERPL: 2.16 {RATIO}
LEUKOCYTE ESTERASE UR QL STRIP.AUTO: ABNORMAL
LYMPHOCYTES # BLD AUTO: 1.17 10*3/MM3 (ref 0.7–3.1)
LYMPHOCYTES NFR BLD AUTO: 27.5 % (ref 19.6–45.3)
MCH RBC QN AUTO: 33.1 PG (ref 26.6–33)
MCHC RBC AUTO-ENTMCNC: 33.5 G/DL (ref 31.5–35.7)
MCV RBC AUTO: 98.9 FL (ref 79–97)
MONOCYTES # BLD AUTO: 0.31 10*3/MM3 (ref 0.1–0.9)
MONOCYTES NFR BLD AUTO: 7.3 % (ref 5–12)
NEUTROPHILS NFR BLD AUTO: 2.64 10*3/MM3 (ref 1.7–7)
NEUTROPHILS NFR BLD AUTO: 62.2 % (ref 42.7–76)
NITRITE UR QL STRIP: NEGATIVE
NRBC BLD AUTO-RTO: 0 /100 WBC (ref 0–0.2)
NT-PROBNP SERPL-MCNC: 42.4 PG/ML (ref 0–450)
PH UR STRIP.AUTO: 6 [PH] (ref 4.5–8)
PLATELET # BLD AUTO: 194 10*3/MM3 (ref 140–450)
PMV BLD AUTO: 9.3 FL (ref 6–12)
POTASSIUM SERPL-SCNC: 4.3 MMOL/L (ref 3.5–4.7)
PROT SERPL-MCNC: 7.3 G/DL (ref 6.3–8)
PROT UR QL STRIP: ABNORMAL
RBC # BLD AUTO: 4.74 10*6/MM3 (ref 3.77–5.28)
SODIUM SERPL-SCNC: 136 MMOL/L (ref 134–145)
SP GR UR STRIP: 1.02 (ref 1–1.03)
TRIGL SERPL-MCNC: 189 MG/DL (ref 0–150)
TROPONIN T SERPL-MCNC: <0.01 NG/ML (ref 0–0.03)
UROBILINOGEN UR QL STRIP: ABNORMAL
VLDLC SERPL-MCNC: 32 MG/DL (ref 5–40)
WBC # BLD AUTO: 4.25 10*3/MM3 (ref 3.4–10.8)

## 2021-04-13 PROCEDURE — 96375 TX/PRO/DX INJ NEW DRUG ADDON: CPT

## 2021-04-13 PROCEDURE — 25010000002 DEXAMETHASONE SODIUM PHOSPHATE 100 MG/10ML SOLUTION: Performed by: INTERNAL MEDICINE

## 2021-04-13 PROCEDURE — 81003 URINALYSIS AUTO W/O SCOPE: CPT

## 2021-04-13 PROCEDURE — 83880 ASSAY OF NATRIURETIC PEPTIDE: CPT | Performed by: INTERNAL MEDICINE

## 2021-04-13 PROCEDURE — 87077 CULTURE AEROBIC IDENTIFY: CPT | Performed by: INTERNAL MEDICINE

## 2021-04-13 PROCEDURE — 87086 URINE CULTURE/COLONY COUNT: CPT | Performed by: INTERNAL MEDICINE

## 2021-04-13 PROCEDURE — 25010000002 FOSAPREPITANT PER 1 MG: Performed by: INTERNAL MEDICINE

## 2021-04-13 PROCEDURE — 25010000002 BEVACIZUMAB 100 MG/4ML SOLUTION 4 ML VIAL: Performed by: INTERNAL MEDICINE

## 2021-04-13 PROCEDURE — 25010000002 BEVACIZUMAB PER 10 MG: Performed by: INTERNAL MEDICINE

## 2021-04-13 PROCEDURE — 96367 TX/PROPH/DG ADDL SEQ IV INF: CPT

## 2021-04-13 PROCEDURE — 85025 COMPLETE CBC W/AUTO DIFF WBC: CPT

## 2021-04-13 PROCEDURE — 80053 COMPREHEN METABOLIC PANEL: CPT

## 2021-04-13 PROCEDURE — 25010000002 LEUCOVORIN CALCIUM PER 50 MG: Performed by: INTERNAL MEDICINE

## 2021-04-13 PROCEDURE — 80061 LIPID PANEL: CPT | Performed by: INTERNAL MEDICINE

## 2021-04-13 PROCEDURE — 84484 ASSAY OF TROPONIN QUANT: CPT | Performed by: INTERNAL MEDICINE

## 2021-04-13 PROCEDURE — 99215 OFFICE O/P EST HI 40 MIN: CPT | Performed by: INTERNAL MEDICINE

## 2021-04-13 PROCEDURE — 96416 CHEMO PROLONG INFUSE W/PUMP: CPT

## 2021-04-13 PROCEDURE — 25010000002 FLUOROURACIL PER 500 MG: Performed by: INTERNAL MEDICINE

## 2021-04-13 PROCEDURE — 87186 SC STD MICRODIL/AGAR DIL: CPT | Performed by: INTERNAL MEDICINE

## 2021-04-13 PROCEDURE — 96413 CHEMO IV INFUSION 1 HR: CPT

## 2021-04-13 RX ORDER — ATROPINE SULFATE 0.4 MG/ML
0.25 AMPUL (ML) INJECTION
Status: CANCELLED | OUTPATIENT
Start: 2021-04-13

## 2021-04-13 RX ORDER — CEFDINIR 300 MG/1
300 CAPSULE ORAL 2 TIMES DAILY
Qty: 10 CAPSULE | Refills: 0 | Status: SHIPPED | OUTPATIENT
Start: 2021-04-13 | End: 2021-05-03

## 2021-04-13 RX ORDER — SODIUM CHLORIDE 9 MG/ML
250 INJECTION, SOLUTION INTRAVENOUS ONCE
Status: COMPLETED | OUTPATIENT
Start: 2021-04-13 | End: 2021-04-13

## 2021-04-13 RX ORDER — PALONOSETRON 0.05 MG/ML
0.25 INJECTION, SOLUTION INTRAVENOUS ONCE
Status: CANCELLED | OUTPATIENT
Start: 2021-04-13

## 2021-04-13 RX ORDER — SODIUM CHLORIDE 9 MG/ML
250 INJECTION, SOLUTION INTRAVENOUS ONCE
Status: CANCELLED | OUTPATIENT
Start: 2021-04-13

## 2021-04-13 RX ADMIN — SODIUM CHLORIDE 100 ML: 9 INJECTION, SOLUTION INTRAVENOUS at 13:19

## 2021-04-13 RX ADMIN — FLUOROURACIL 4850 MG: 50 INJECTION, SOLUTION INTRAVENOUS at 15:21

## 2021-04-13 RX ADMIN — SODIUM CHLORIDE 250 ML: 9 INJECTION, SOLUTION INTRAVENOUS at 13:15

## 2021-04-13 RX ADMIN — SODIUM CHLORIDE 810 MG: 900 INJECTION, SOLUTION INTRAVENOUS at 14:45

## 2021-04-13 RX ADMIN — BEVACIZUMAB 900 MG: 400 INJECTION, SOLUTION INTRAVENOUS at 14:10

## 2021-04-13 RX ADMIN — DEXAMETHASONE SODIUM PHOSPHATE 12 MG: 10 INJECTION, SOLUTION INTRAMUSCULAR; INTRAVENOUS at 13:50

## 2021-04-13 NOTE — PROGRESS NOTES
REASON FOR FOLLOW UP:     1. Rectal cancer, rectal ultrasound examination in July 2019 reported T3N0 disease without lymphadenopathy. She does have small pulmonary nodule 6-7 mm and 2 mm with indeterminate feature. There is no solid evidence of metastatic disease otherwise. Patient has stage IIA (T3N0M0) disease.  2. The patient is heterozygous factor V Leiden, was on prophylactic anticoagulation with Lovenox 40 mg daily given her increased risk of thrombosis with MediPort and GI malignancy.   3. PET scan on 8/8/2019 reported an 8 mm hypermetabolic right upper lobe pulmonary nodule, which is suspicious for metastatic as well.    4. Patient had a PowerPort placement on the left upper chest by Dr. Jsoeph on 8/9/2019.  5. Patient was started on concurrent infusional 5-FU chemoradiation therapy on 8/12/2019, with planned complete surgical resection and further adjuvant chemotherapy with intention to cure the disease.   6. Patient underwent surgical resection of the primary rectal cancer by Dr. Ye on 12/2/2019.  Pathology evaluation reported residual T3N1 disease stage IIIb.  7. Diarrhea related to therapy and radiation.   8. Patient was started cycle 1 day 1 of adjuvant FOLFOX 6 on 1/23/2020.  9. On 2/5/2020 FOLFOX 6 cycle 2 delayed secondary to neutropenia.  Patient was given 3 days of Granix injection.  After cycle #2 we planned 3 days of Granix with each cycle.   10. Patient also intolerant of oral iron.  Patient received 2 doses of IV injectafer on 02/05/2020 and 02/12/2020.   11. 02/12/2020 Proceed with cycle #2 of FOLFOX 6 with 3 days of Granix.  12. On 3/11/2020 cycle 4 postponed secondary to abdominal pain and occasional low-grade fevers.  CT scans ordered.  13. Cycle #4 resumed after CT scan revealed no evidence of disease.  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 Ye. patient has no fever.  Continue to observe.   14. Grade 3  hand-foot syndrome secondary to 5-FU after cycle #7 FOLFOX 6 chemotherapy, prompting ER visit.  Also has worsening peripheral neuropathy.   15. Cycle #8 FOLFOX 6 was given on 5/13/2020, with 50% dose reduction for both 5-FU and oxaliplatin, and also examination of bolus 5-FU.   16. PET scan examination on 5/21/2020 reported no evidence of metastatic disease in the chest abdomen pelvis.  Stable 6 mm RUL pulmonary nodule.  17. On 5/27/2020, I discussed with the patient that we can discontinue chemotherapy at this time.   18. Patient had a surgical procedure for low anterior colon resection, coloanal anastomosis on 8/3/2020.  19. CT scan for chest abdomen pelvis on 9/9/2020 reported a new 8 mm noncalcified pulmonary nodule in the left lower lobe of the lung.  Otherwise stable right upper lobe 6 mm pulmonary nodule, and no evidence of disease recurrence in the abdomen or pelvis.  20. PET scan examination on 9/18/2020 reported multiple hypermetabolic small pulmonary nodules/ new pulmonary nodules.   21. Patient was started on pelvic chemotherapy with FOLFIRI regimen on 10/13/2020.   22. 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 It reported tumor mutation burden 5 Muts/Mb, microsatellite stable, TP53 mutation R282W, and others.   23. Cycle #5 was without irinotecan, due to peripheral neuropathy.  24. 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  Patient was switched to weight-based Lovenox injection.  25. Cycle #6 5-FU and irinotecan was delayed by 2 weeks because of the above incident.  26. Patient had a telemedicine evaluation at that the [facility] in February 2021.  They agreed with our treatment plan for palliative chemotherapy followed by maintenance chemotherapy.      HISTORY OF PRESENT ILLNESS:  The patient is a 40 y.o. female with the above-mentioned  history who is here today for reevaluation to discuss the PET scan results and the further treatment plan.    PET scan examination obtained on 4/6/2021 after 12 cycles of palliative chemotherapy reported no hypermetabolic lesions.  There was a marginal decrease of the right upper lobe pulmonary nodule from 6 mm to 5 mm.  Left upper lobe 6 mm nodule is stable.    Patient reports that she is at her baseline condition.  Performance status ECOG 1-2.  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  She had a some nausea after chemotherapy today improves.  She continues to have diarrhea/liquid stool in the ostomy bag.    Continues on Lovenox injections every 12 hours and denies bleeding issues.  She is no dyspnea no chest pain.  No worsening facial edema.    Laboratory studies today on 4/13/2021 reported hemoglobin 15.7 hematocrit 46.9, normal platelets 194,000 and a WBC 4250.  Chemistry lab reported a marginally elevated ALT 56 and alk phosphatase 121 otherwise unremarkable CMP.    Urinalysis today on 4/13/2021 reported persistent proteinuria 2+, also she is 1+ leukocytes.  Upon questioning, patient reports she has dysuria for the past day however denies of fever or sweating or chills.    Past Medical History:   Diagnosis Date   • Abdominopelvic abscess (CMS/HCC) 08/12/2020    ADMITTED TO [facility]   • Abnormal Pap smear of cervix 02/02/1998    JULIUS I   • Anemia in pregnancy    • Anxiety    • Bilateral epiphora 04/2020   • Bilateral hand swelling 05/02/2020    SEEN AT [facility] ER   • Cervical lymphadenitis 06/06/2012    SEEN AT [facility] ER   • Chemotherapy induced neutropenia (CMS/HCC) 04/2020   • Chemotherapy-induced nausea 02/2020   • Chemotherapy-induced thrombocytopenia 05/2020   • Chronic diarrhea    • Colon polyps     FOLLOWED BY DR. GERONIMO ESPARZA   • Cystitis 04/24/2020    WITH DEHYDRATION, ADMITTED TO [facility]   • Cystitis 10/27/2020   • Drug-induced peripheral neuropathy (CMS/HCC) 05/2020   • Fistula of intestine    • GERD  (gastroesophageal reflux disease)    • Hand foot syndrome 2020   • Heart murmur     IN CHILDHOOD   • Hematochezia    • Hemorrhoids    • Heterozygous factor V Leiden mutation (CMS/[facility])     DX 2019   • History of anemia    • History of chemotherapy     FOLLOWED BY DR. ALEXANDRU HUNT   • History of gestational diabetes    • History of pre-eclampsia    • History of radiation therapy     FOLLOWED BY DR. JAVON LEWIS   • History of TB skin testing 2009    TB Skin Test   • HPV in female    • Hypokalemia 2019   • Hypomagnesemia 2019   • IBS (irritable bowel syndrome)    • Ileostomy in place (CMS/[facility])     FOLLOWED BY DR. GERONIMO ESPARZA   • Infected insect bite of neck 2016    SEEN AT [facility]   • Kidney stone    • Kidney stones 2007    SEEN AT [facility] ER   • Lump of right breast     SWOLLEN LYMPH NODE   • Lung cancer (CMS/[facility]) 2020    METASTATIC LUNG CANCER   • Monopolar depression (CMS/[facility])    • On anticoagulant therapy    • Perirectal abscess 2020   • PIH (pregnancy induced hypertension)    • Pulmonary embolism without acute cor pulmonale (CMS/[facility]) 09/20/2019    X 3; ADMITTED TO [facility]   • Pulmonary nodule, right 2020   • Rectal cancer (CMS/[facility]) 2019    STAGE IIA, INVASIVE MODERATELY DIFFERENTIATED ADENOCARCINOMA, CHEMO AND XRT FINISHED 2019   • Right shoulder pain 2020    SEEN AT [facility] ER   • Right ureteral stone 10/01/2019    SEEN AT [facility] ER   • SAB (spontaneous ) 1996     A2-1 INDUCED   • Sciatica    • Sepsis due to cellulitis (CMS/[facility]) 2002    LEFT GREAT TOE, ADMITTED TO [facility]   • Tachycardia 2020   • Urinary urgency 2020     Past Surgical History:   Procedure Laterality Date   • CHOLECYSTECTOMY N/A 10/10/2003    LAPAROSCOPIC WITH CHOLANGIOGRAM, DR. JAMEY TALAVERA AT [facility]   • COLON RESECTION N/A 2019    Procedure: laparoscopic low anterior colon resection with mobilization of splenic flexure and diverting loop  ileostomy: ERAS;  Surgeon: Geronimo Ye MD;  Location: [facility] MAIN OR;  Service: General   • COLON RESECTION N/A 8/3/2020    Procedure: LOW ANTERIOR COLON RESECTION, COLOANAL ANASTOMOSIS, MOBILIZATION SPLENIC FLEXURE;  Surgeon: Geronimo Ye MD;  Location: [facility] MAIN OR;  Service: General;  Laterality: N/A;   • COLONOSCOPY N/A 7/15/2020    PATENT ANASTAMOSIS IN MID RECTUM, RESCOPE IN 1 YR, DR. GERONIMO YE AT [facility]   • COLONOSCOPY W/ POLYPECTOMY N/A 07/08/2019    15 MM TUBULOVILLOUS ADENOMA POLYP IN HEPATIC FLEXURE, 20 MMTUBULOVILLOUS ADENOMA WITH HIGH GRADE DYSPLASIA IN RECTOSIGMOID, 4 CM MASS IN MID RECTUM, PATH: INVASIVE MODERATELY DIFFERENTIATED ADENOCARCINOMA, DR. JENNIFER LI AT [facility]   • DILATATION AND EVACUATION N/A 2009   • ENDOSCOPY N/A 07/08/2019    LA GRADE A ESOPHAGITIS, GASTRITIS, ALL BIOPSIES BENIGN, DR. JENNIFER LI AT [facility]   • INCISION AND DRAINAGE PERIRECTAL ABSCESS N/A 8/14/2020    Procedure: INCISION AND DRAINAGE OF retrorectal dehiscence abcess with drain placement and irrigation;  Surgeon: Geronimo Ye MD;  Location: [facility] MAIN OR;  Service: General;  Laterality: N/A;   • PAP SMEAR N/A 01/24/2014   • SIGMOIDOSCOPY N/A 7/24/2019    INFILTRATIVE PARTIALLY OBSTRUCTING LARGE RECTAL CANCER, AREA TATOOED, DR. GERONIMO YE AT [facility]   • SIGMOIDOSCOPY N/A 11/23/2019    AN ULCERATED PARTIALLY OBSTRUCTING MASS IN MID RECTUM, AREA TATTOOED, DR. GERONIMO YE AT [facility]   • TRANSRECTAL ULTRASOUND N/A 7/24/2019    Procedure: ULTRASOUND TRANSRECTAL;  Surgeon: Geronimo Ye MD;  Location: [facility] ENDOSCOPY;  Service: General   • URETEROSCOPY LASER LITHOTRIPSY WITH STENT INSERTION Right 10/30/2019    DR. ESTUARDO BEASLEY AT [place]   • VAGINAL DELIVERY N/A 09/18/1998   • VENOUS ACCESS DEVICE (PORT) INSERTION Left 8/9/2019    Procedure: INSERTION VENOUS ACCESS DEVICE;  Surgeon: Sj Joseph MD;  [facility];  Service: General   • VENOUS ACCESS DEVICE (PORT)  INSERTION N/A 12/18/2020    Procedure: INSERTION VENOUS ACCESS DEVICE right side, removal venous access device left side;  Surgeon: Sj Joseph MD;  Location[facility] OR [facility];  Service: General;  Laterality: N/A;   • WISDOM TOOTH EXTRACTION Bilateral 1993       HEMATOLOGIC/ONCOLOGIC HISTORY:      The patient reports she has intermittent rectal bleeding and urgency, mucous with her stool, starting sometime in 2016. At that time she was referred to GI service, and was diagnosed of irritable bowel syndrome. Nevertheless she had worsening urgency for bowel movement, and had a couple of incidents losing control of stool. She was recently seen by Roland Thorpe MD again and had colonoscopy and EGD exam on 07/08/2019. She was found having a circumferential rectal mass. According to the procedure note, the patient had a fungating circumferential bleeding 4 cm mass in the middle rectum, at distance between 13 cm and 17 cm from the anus. Mass was causing partial obstruction. There were also colon polyps found at the hepatic flexure and also at the rectosigmoid junction 23 cm from the anus. Both were resected and retrieved. EGD examination reported grade A esophagitis at the GE junction and patchy discontinuous erythema and aggravation of the mucosa without bleeding in the stomach body. There is normal mucosa of the duodenum. Pathology evaluation from this procedure reported moderately differentiated adenocarcinoma involving the rectal mass. The rectal sigmoid junction polyp was tubular/tubulovillous adenoma with high grade dysplasia negative for invasive malignancy. The hepatic flexure polyp was also tubular/tubulovillous adenoma negative for high grade dysplasia or malignancy. The biopsy from the esophagus reports squamocolumnar mucosa with inflammatory changes suggestive of mild reflux esophagitis, negative for interstitial metaplasia. Gastric biopsy was benign and duodenal biopsy was also benign. There is no mention of  H-pylori.     Patient was subsequently referred to colorectal surgeon Padmaja Ye MD for further evaluation. The patient had CT scan examination for chest, abdomen and pelvis requested by Dr. Ye and were done on 07/13/2019. The study reported no evidence of lymphadenopathy in the abdomen and pelvis. There was wall thickening of the rectosigmoid junction. Normal spleen, pancreas, adrenal glands and kidneys. There was fatty liver infiltration but no focal lymphatic lesions. There was a small 6-7 mm noncalcified nodule in the right upper lobe and a tiny 3 mm subpleural nodule in the right middle lobe. No mediastinal or hilar lymphadenopathy.     Dr. Ye performed staging rectal ultrasound examination. This study reported tumor penetrating through the muscularis propria as T3 disease; there were no lymph nodes identified.    She had a hospitalization in late September 2019 because of newly discovered pulmonary emboli.  She was on prophylactic Lovenox prior to the incident of PE.  Now she is on full dose Xarelto anticoagulation.  Patient reports no further chest pain dyspnea.  She denies bleeding or bruising.  During her hospitalization, she was seen by Dr. Ye, who plans to have surgery 8 to 12 weeks after finishing radiation therapy.  She finished her radiation on 9/19/2019.     I noticed patient also presented to the emergency room on 10/1/2019 complaining of right flank area pain.  I reviewed the images studies and indeed she has a very small 1 to 2 mm obstructing kidney stone in the UV junction.  Patient is still symptomatic with some pains and dysuria.  She denies fever sweating or chills.    Laboratory study on 10/7/2019 reported normal CBC including hemoglobin 13.1, platelets 301,000, WBC 6170 and ANC 4900 lymphocytes 590 monocytes 480.      The nurse reported malfunction of port-a-catheter on 10/7/2019.  Port study on 10/8/2019 showed fibrin sheath around the distal aspect of the Mediport catheter in  the SVC. This does not appear to hinder injection, but does prevent aspiration at this time.    Patient underwent surgical resection of the colon on 12/2/2019 with Dr. Ye.  Pathology evaluation reported residual T3 disease, also 1 out of 14 lymph nodes positive for malignancy.  So this patient in either had at least stage IIIb disease (T3 N1 M0?).      Adjuvant chemotherapy FOLFOX 6 will be started on 1/22/2020.  Laboratory study reported iron saturation 10%, free iron 31 TIBC 319 and ferritin 168.  Her hemoglobin was 11.8, WBC 5600, and platelets 347,000.    Patient was here on 02/12/2020 for cycle 2 of her FOLFOX.  This is delayed x1 week secondary to neutropenia.  The patient ultimately received 3 days worth of Neupogen with recovery of her blood counts.  Of note, the patient struggled with significant nausea without any episodes of vomiting following cycle #1 of chemotherapy resulting in significant weight loss.  She is up 12 pounds over the last week as her appetite has normalized.  We will add Emend to her care plan.    She is having several loose stools per her colostomy and has been hesitant to take Imodium due to prior history of constipation.  I encouraged her to try this with a maximum of 8 tablets/day.  She denies any infectious symptoms including fevers or chills.  No excess bleeding or bruising noted.  She had the expected cold sensitivity related to the Oxaliplatin for about 3 days following treatment.    Labs from 02/12/2020 demonstrated total white blood cell count of 5.14, ANC of 3.06, hemoglobin of 11.2, platelets of 211,000.  She was found to be iron deficient last week and is intolerant to oral iron secondary to GI distress.  For this reason, she initiated IV iron therapy with Injectafer last week.  She had received her second dose 02/12/2020    Patient has normalized hemoglobin 12.2 on 2/26/2020.    She reported on 5/5/2020 she had a recent visit to the emergency department for what was  suspected to be an allergic reaction.  She called our on-call service on Saturday with reports of hand and face swelling.  She was instructed to proceed to the emergency department at which time she was assessed and prescribed a Medrol Dosepak.  She had just completed her 5-FU and was unhooked on Friday, 5/3/2020.  Her symptoms have improved.  She does report persistent hyperpigmentation and mild swelling of the palms of the hands but this is much improved.  It was her right hand specifically that was swollen.  Her facial swelling has resolved.  She continues on Cefdinir nd since has 1 day remaining, she was prescribed cefdinir for a UTI requiring hospitalization at the end of April.  Reports no new symptoms.  Her labs are stable.  She is scheduled for treatment again.    Patient states at this time she is not tolerating her chemotherapy well.     Patient seen in the emergency department on 5/2/2020 for what was suspected to be an allergic reaction.  She called our on-call service on Saturday explaining that she was experiencing hand and face swelling.  She was instructed to proceed to the emergency department at which time she was assessed and prescribed a Medrol Dosepak.  She had just completed her 5-FU and was unhooked on Friday, 5/1/2020.      She reports since her ED visit on 5/2/2020 her symptoms have not improved. Her hands and feet were swollen upon presenting to the ED and she could barely make a fist. She states that she feels so swollen she is not able to stand it. She states that her skin on the hands and feet are peeling extensively as well, besides changing color to more dark.     She also reports significant fatigue after her first week of the 5-FU treatment but she expected this side effect. She also notices significant increase in her neuropathy to the point that she is not able to even walk around in her home without her house slippers due to irritation from her rugs. She denies and associated  nausea or vomiting at this time. She also has episodes of epistaxis every day after the chemotherapy cycle #7.  She does report working full-time during the week of chemotherapy.    Laboratory studies, 5/13/2020, show moderate thrombocytopenia platelets 123,000, low normal WBC 4140 including ANC 2720 and normal hemoglobin 13.4.  Because significant hand-foot syndrome, will decrease both 5-FU and oxaliplatin by 50%, and eliminate bolus dose of 5-FU.    On 5/21/2020 patient had a PET scan performed which showed no convincing evidence of residual disease in abdomen or pelvis, no metastatic disease within the chest or neck.     Cycle #8 FOLFOX 6 was given on 5/13/2020, with 50% dose reduction for both 5-FU and oxaliplatin, and also examination of bolus 5-FU.  She states on 5/27/2020 that with the recent reduction of the chemotherapy she feels significantly better. She has more energy and is able to do her daily routine.      PET scan examination on 5/21/2020 reported no evidence of metastatic disease in chest abdomen pelvis.  There was a stable 6 mm right upper lobe pulmonary nodule.    Laboratory studies on 5/27/2020 showed mild leukocytopenia WBC 3720 but a normal ANC 2250 and lymphocytes 630.  Normal platelets 163,000 and hemoglobin 12.6.  Chemistry lab reported normal renal function, liver function panel and a electrolytes, elevated glucose 164.    Laboratory studies 6/24/2020, showed normal hemoglobin 13.4 but macrocytosis .9.  Normal platelets 210,000 and WBC 5870.  She had normal CMP.     Patient last time was here in late June 2020.  Since that time she had surgical procedure for low anterior colon resection, coloanal anastomosis on 8/3/2020.  She later developed a perirectal abscess, required surgical drainage on 8/14/2020.  She was discharged on 8/27/2020.    Patient reports to me she still has lower abdominal wall vacuum suction in place.  She denies fever sweating chills.  Performance status is ECOG  1.  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  She does have visiting nurse come to the home for wound care.    CT scan for chest abdomen pelvis on 9/9/2020 reported a new 8 mm noncalcified pulmonary nodule in the left lower lobe.  Otherwise stable right upper lobe 6 mm pulmonary nodule, and no evidence of disease recurrence in the abdomen or pelvis.     Laboratory study on 9/16/2020 reported elevated AST 55, ALT 95, alk phosphatase 143 but normal total bilirubin 0.3.  Chemistry lab otherwise unremarkable.  She has completed normal CBC.      Because of the abnormal CT scan examination for chest abdomen pelvis on 9/9/2020 reported a new 8 mm noncalcified pulmonary nodule in the left lower lobe, we obtained a PET scan examination for further evaluation, which was done on 9/18/2020.  This study reported several pulmonary nodules, some of them are hypermetabolic, newly developed compared to previous PET scan in May 2020.  Certainly does highly suspicious for metastatic disease.  There are also hypermetabolic activity in the abdomen and pelvic area which is hard to differentiate from surgical scar versus metastatic malignancy.    Laboratory study on 10/13/2020 reported normal CBC with Hb 13.9, platelets 302,000 and WBC 5520 including ANC 3830.  Chemistry lab reported normalized AST 20, alk phosphatase 116 improved ALT 35, and maintains normal bilirubin, with normal electrolytes and liver function panel.     Patient was started on first cycle of palliative chemotherapy FOLFIRI on 10/13/2020.    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  Patient had a new port placed 12/18/2020, and had held her Xarelto for 2 days prior to surgery.  She presented to the ER on 12/21/20 with complaints of facial and arm swelling for 3 days.  She also  noted increased shortness of breath.  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  Thrombus within the right internal jugular vein cannot be excluded.  Patient was started on IV heparin, and eventually transitioned to weight-based Lovenox, which she now continues.      MEDICATIONS    Current Outpatient Medications:   •  clonazePAM (KlonoPIN) 1 MG tablet, TAKE 1 TABLET BY MOUTH THREE TIMES A DAY AS NEEDED FOR ANXIETY OR SEIZURES, Disp: 90 tablet, Rfl: 0  •  dicyclomine (BENTYL) 20 MG tablet, TAKE 1 TABLET BY MOUTH EVERY 6 HOURS AS NEEDED, Disp: 360 tablet, Rfl: 0  •  diphenoxylate-atropine (LOMOTIL) 2.5-0.025 MG per tablet, TAKE 1 TABLET BY MOUTH 4 (FOUR) TIMES A DAY AS NEEDED FOR DIARRHEA., Disp: 120 tablet, Rfl: 1  •  enoxaparin (Lovenox) 100 MG/ML solution syringe, Inject 1 mL under the skin into the appropriate area as directed Every 12 (Twelve) Hours., Disp: 60 mL, Rfl: 2  •  escitalopram (LEXAPRO) 10 MG tablet, TAKE 1 TABLET BY MOUTH EVERY DAY, Disp: 30 tablet, Rfl: 5  •  famotidine (PEPCID) 20 MG tablet, TAKE 1 TABLET BY MOUTH TWICE A DAY, Disp: 180 tablet, Rfl: 1  •  Loratadine (CLARITIN) 10 MG capsule, Take 10 mg by mouth Every Evening., Disp: , Rfl:   •  ondansetron (ZOFRAN) 8 MG tablet, Take 1 tablet by mouth 3 (Three) Times a Day As Needed for Nausea or Vomiting., Disp: 60 tablet, Rfl: 5  •  prochlorperazine (COMPAZINE) 10 MG tablet, Take 1 tablet by mouth Every 6 (Six) Hours As Needed for Nausea or Vomiting., Disp: 30 tablet, Rfl: 2  •  promethazine (PHENERGAN) 25 MG tablet, Take 1 tablet by mouth Every 6 (Six) Hours As Needed for Nausea or Vomiting., Disp: 60 tablet, Rfl: 2    ALLERGIES:   No Known Allergies    SOCIAL HISTORY:       Social History     Tobacco Use   • Smoking status: Current Every Day Smoker     Packs/day: 0.50     Years: 24.00     Pack years: 12.00     Types: Electronic Cigarette, Cigarettes   • Smokeless tobacco: Never Used   Vaping Use   • Vaping Use:  Some days   • Substances: Nicotine   • Devices: Disposable   Substance Use Topics   • Alcohol use: Not Currently     Comment: Caffeine use rare   • Drug use: No         FAMILY HISTORY:   Mother has positive factor V Leiden mutation, although she did not have thrombosis, mother also is coronary disease with stenting, she also is occasional bruising.  Maternal grandmother had DVT, she had multiple surgical procedures.  Patient mother's half-brother had metastatic colon cancer at diagnosis in his 50s.  Maternal great aunt has breast cancer.  Patient will follow his skin cancer in his 60s, exclusive.    REVIEW OF SYSTEMS:  Review of Systems   Constitutional: Negative for activity change, appetite change, chills, fatigue, fever and unexpected weight change.   HENT: Negative for hearing loss, mouth sores, nosebleeds and trouble swallowing.    Eyes: Negative for photophobia and visual disturbance.   Respiratory: Negative for cough, chest tightness and shortness of breath.    Cardiovascular: Negative for chest pain, palpitations and leg swelling.   Gastrointestinal: Positive for diarrhea (ostomy). Negative for abdominal distention, abdominal pain, anal bleeding, constipation and nausea.        See HPI   Endocrine: Negative for cold intolerance and heat intolerance.   Genitourinary: Positive for dysuria and frequency. Negative for hematuria.   Musculoskeletal: Negative for arthralgias, back pain, joint swelling and neck pain.   Skin: Negative for color change, pallor and wound.   Allergic/Immunologic: Positive for immunocompromised state (Secondary to adjuvant chemotherapy). Negative for environmental allergies and food allergies.   Neurological: Positive for numbness (Mild numbness involving fingertips and toes). Negative for dizziness, weakness and headaches.   Hematological: Negative for adenopathy. Bruises/bleeds easily (At the site of Lovenox injection only the left abdominal wall).   Psychiatric/Behavioral: Negative  "for agitation, confusion and dysphoric mood (Depression under control with medication). The patient is not nervous/anxious.    04/13/2021 ROS unchanged from above except as updated.             Vitals:    04/13/21 1156   BP: 136/92   Pulse: 105   Resp: 16   Temp: 97.3 °F (36.3 °C)   TempSrc: Temporal   SpO2: 97%   Weight: 90.4 kg (199 lb 6.4 oz)   Height: 165.1 cm (65\")   PainSc: 0-No pain     Current Status 4/13/2021   ECOG score 0         Physical Exam  Vitals reviewed.   Constitutional:       General: She is not in acute distress.     Appearance: Normal appearance. She is well-developed.   HENT:      Head: Normocephalic and atraumatic.      Mouth/Throat:      Pharynx: No oropharyngeal exudate.   Eyes:      Pupils: Pupils are equal, round, and reactive to light.   Cardiovascular:      Rate and Rhythm: Regular rhythm. Tachycardia present.      Heart sounds: Normal heart sounds. No murmur heard.     Pulmonary:      Effort: Pulmonary effort is normal. No respiratory distress.      Breath sounds: Normal breath sounds. No wheezing.   Abdominal:      General: Bowel sounds are normal. There is no distension.      Palpations: Abdomen is soft. There is no mass.      Comments: Ostomy in place with liquid brown stool.  Scattered bruises on the abdomen bilaterally from Lovenox injections.   Musculoskeletal:      Cervical back: Neck supple.      Right lower leg: No edema.      Left lower leg: No edema.   Lymphadenopathy:      Cervical: No cervical adenopathy.   Skin:     General: Skin is warm and dry.      Findings: No lesion or rash.   Neurological:      Mental Status: She is alert and oriented to person, place, and time.     04/13/2021 physical exam is unchanged from above except as updated.    RECENT LABS:    Lab Results   Component Value Date    WBC 4.25 04/13/2021    HGB 15.7 04/13/2021    HCT 46.9 (H) 04/13/2021    MCV 98.9 (H) 04/13/2021     04/13/2021     Lab Results   Component Value Date    NEUTROABS 2.64 " 04/13/2021     Lab Results   Component Value Date    GLUCOSE 114 04/13/2021    BUN 9 04/13/2021    CREATININE 0.85 04/13/2021    EGFRIFNONA 74 04/13/2021    BCR 10.6 04/13/2021    K 4.3 04/13/2021    CO2 23.4 04/13/2021    CALCIUM 9.7 04/13/2021    ALBUMIN 4.00 04/13/2021    AST 24 04/13/2021    ALT 56 (H) 04/13/2021       IMAGING STUDY:   F-18 FDG PET FROM SKULL BASE TO MID THIGH WITH PET/CT FUSION 4/6/2021     HISTORY: 40-year-old female with metastatic rectal cancer status post  low anterior resection on 12/02/2019 and coloanal anastomosis on  08/03/2020. Restaging.     TECHNIQUE: Radiation dose reduction techniques were utilized, including  automated exposure control and exposure modulation based on body size.   Blood glucose level at time of injection was 112 mg/dL.  5.9 mCi of F-18  FDG were injected and PET was performed from skull base to mid thigh. CT  was obtained for localization and attenuation correction. Time at  injection 7:20 AM. PET start time 8:37 AM. Compared with PET/CT from  01/12/2021.     FINDINGS: There is no suspicious hypermetabolic activity within the  chest. Specifically, there is no hypermetabolic mediastinal or hilar  lymphadenopathy. The 5 mm right upper lobe pulmonary nodule previously  measured 6 mm. There is continued long-term stability of a sub-6 mm left  upper lobe pulmonary nodule, image 136. There is no suspicious  hypermetabolic activity within the neck. There is no suspicious  hypermetabolic activity within the abdomen. There is no significant  change in the low-level activity throughout the presacral soft tissue  thickening. There is no hypermetabolic lymphadenopathy. Multiple  anterior abdominal wall injection sites are noted which were not present  previously. There are tiny hematomas at some of the injection sites and  the largest is on the left measuring 2.6 cm, image 323.     IMPRESSION:  Marginal decrease in size of the 5 mm right upper lobe  pulmonary nodule. There is  no hypermetabolic lymphadenopathy within the  chest. Stable low-level activity in the presacral soft tissue  thickening. There is no hypermetabolic lymphadenopathy within the  abdomen or pelvis or evidence for new metastatic disease.         Assessment/Plan      ASSESSMENT:   1.  Rectal cancer, rectal ultrasound examination reported T3N0 disease without lymphadenopathy.   · CT scan of chest, abdomen and pelvis reported no lymphadenopathy in the abdomen, pelvis or chest. She does have small pulmonary nodule 6-7 mm and 2 mm with indeterminate feature. There is no solid evidence of metastatic disease otherwise.   · Based on the CT scan and rectal ultrasound imaging studies, this patient had stage IIA (T3N0M0) disease.    · She had PET scan examination on 8/8/2019 which reported a hypermetabolic right upper lobe pulmonary nodule 6 mm with SUV 5.6.  This is suspicious for metastatic disease however it is too small to be biopsied.  This patient may have stage IV disease.   · She initiated concurrent radiation with continuous 5-FU on 8/12/2019.  · Patient finished concurrent chemoradiation therapy.  · Patient underwent surgical resection of the rectal tumor and diverting loop ileostomy on 12/2/2019 with Dr. Ye.  Pathology evaluation reported residual T3 disease, with 1 out of 14 lymph nodes positive for malignancy.  Certainly this patient has at least stage IIIb rectal cancer (T3 N1 M0?)  · On 1/22/2020 adjuvant chemotherapy FOLFOX 6 regimen initiated.    · On 2/5/2022 cycle #2 was delayed secondary to neutropenia.  She was given 3 days of Granix.   · Emend added with cycle 3.  With improved nausea control  · Continuing home Granix x3 days following 5-FU disconnect  · 3/11/2020 due for cycle 4, however, she is experiencing progressive abdominal pain and occasional fevers.   · CT scan performed on 3/13/2020 reveals no evidence of progressive or recurrent disease.  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  · Patient  hospitalized 4/24-4/26/20 after cycle 5 of chemotherapy with acute UTI.  CT abdomen/pelvis noting fluid collection in the presacral region having diminished in size compared to CT dated 3/13/2020.  Patient was evaluated by Dr. Ye while in the hospital with further plans to evaluate possible colovaginal fistula following completion of chemotherapy.  Patient did respond to IV antibiotics and discharged home on oral cefdinir.  · 4/29/2020 cycle 6 of FOLFOX.  Urinary symptoms have resolved   · Patient seen in the [facility] ED on 5/2/2020 for what was suspected to be an allergic reaction.  She called our service on Saturday explaining that she was experiencing hand and face swelling.  She was instructed to proceed to the emergency department at which time she was assessed and prescribed a Medrol Dosepak.  She had just completed her 5-FU and was unhooked on Friday, 5/1/2020.  Her symptoms have resolved in the office on assessment, 5/5/2020.  · The patient had grade 3 hand-foot syndrome based on symptomology.  Patient had cycle #8 of 5-FU on 5/13/2020. Due to her symptoms and poor tolerance to the 5-FU, her treatment dose will be reduced 50% for oxaliplatin and infusional 5-FU.  Bolus 5-FU will be discontinued..  · On 5/13/2020  We discussed further treatment options pending the scan results.  If she has indeed residual disease or metastatic disease, we will switch her to irinotecan plus Avastin or anti-EGFR monoclonal antibody.  She will need a further molecular testing of her tumor samples in that case.  · On 5/21/2020 patient had a PET scan and it showed no evidence of of metastatic disease in the neck, chest, abdomen or pelvis.  There was fluid accumulation/abscess.   · On 5/27/2020 I discussed with the patient that we can discontinue chemotherapy at this time.  She will follow-up with Dr. Ye to discuss a possibility to reverse the ileostomy.  We likely will obtain anther PET scan in 3 to 4 months for  reassessment.    · Patient was seen by Dr. Ye on 6/19/2019 for evaluation and to discuss possible take down of her ileostomy.  She is scheduled to have a gastrografin enema on 7/2/2020 to evaluate for a fistula, and then a colonoscopy on 7/15/2020, both done by Dr. Ye.  She states that based on the above imaging and procedure findings, she may have a more extensive surgery done or just have her ileostomy reversed, which would likely be done in August 2020.  This will all be coordinated under the care of Dr. Ye.     · I reviewed scan results with the patient on 9/16/2020 for the most recent CT scan from 09/09/2020 and also compared to her previous PET scan examination from 05/21/2020 and also the original PET scan from 08/09/2019.  The original PET scan there is a small right upper lobe pulmonary nodule 6 mm with SUV 5.6. So in the 05/2020 PET scan the nodule is still there but activity seems much less and this most recent CT scan examination also documented the preexisting small pulmonary nodule however there is a new left lower lobe pulmonary nodule 8 mm in size and I shared with the patient today this nodule is suspicious for metastatic disease. Laboratory studies reported minimal CEA level 1.32 on 09/09/2020. Liver function panel was only marginally abnormal with the ALT 45, the remaining is normal. However laboratory study today showed worsening AST 55, ALT 95 and alkaline phosphatase 143 but is still normal, total bilirubin 0.3.   · So I discussed with the patient on 9/16/2020 recommended to have repeat PET scan examination for assessment because the left lower lobe pulmonary nodule is too small to be biopsied, and if the PET scan reports hypermetabolic lesion, I recommended to have stereotactic radiation therapy. Explained to patient that she is not a really good candidate to have thoracotomy for resection because she still has unhealed wound in the abdomen. I think the stereotactic radiation therapy  will be a more feasible choice.  · Laboratory study on 9/16/2020 reported worsening liver function panel with both elevated AST, ALT and also slightly worsened alkaline phosphatase despite having normal total bilirubin. I recommended to repeat laboratory study for reevaluation. The only new medication she has in the past several days is Augmentin but otherwise no change of condition. She denies any recent viral infection. We will monitor this.   · PET scan examination obtained on 9/18/2020 showed metastatic disease.  It reported a few hypermetabolic pulmonary nodules, and some new small pulmonary nodules, all of them highly suspicious for metastatic disease.  She also has hypermetabolic activity in the abdomen and pelvic area which could be related to scar tissue from her recent surgery versus metastatic disease.  Nevertheless overall picture fits with metastatic cancer.  · I discussed with the patient on 9/20/2020, we reviewed the PET scan images together, and I recommended to have systematic chemotherapy, I do not think stereotactic reading therapy to the hypermetabolic lesions in the lungs warranted at this time because even those will be treated with reading therapy, she will still need systematic chemotherapy.  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  I did discuss with the patient using anti-EGFR monoclonal antibody versus anti-VEGF monoclonal antibody.     · Palliative chemotherapy cycle#1 FOLFIRI was started on 10/13/2020.  No bolus 5-FU given considering previous poor tolerance.    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  Microsatellite stable, mutation burden 5/Mb.    · Discussed with the patient 10/27/2020, because of the [place] mutation, she is not a candidate for anti-EGFR monoclonal antibody such as Erbitux or vectibix.  She could be a candidate for anti-VEGF monoclonal antibody, however because of active wound, she is not a  candidate right now at this moment.  · Patient seen in the ED for acute right neck pain on 11/16/2020.  CT angiogram as well as CT of the cervical spine performed with no acute findings but notably one of her pulmonary nodules, left upper lobe, somewhat decreased in size.  Patient given prednisone pack.  Further details below.  · Patient due today for cycle #4 FOLFIRI.  Plan repeat PET scan after cycle 6.  · Last received cycle #5 chemotherapy without irinotecan on 12/8/2020.   · Patient here 12/29/2020 for evaluation and consideration of cycle 6, but she continues to complain of swelling.  She does have swelling typically after treatment as well.  We will hold treatment for 1 week and reevaluate next week.  Still planning on PET scan following cycle 6.   · Cycle #6 chemotherapy will be resumed on 1/5/2021.  Started back on original irinotecan.  · PET scan examination obtained on 1/12/2021 after cycle #6 chemotherapy reported improved pulmonary nodule hypermetabolic activity.  Confirmed these are metastatic lesions.  · Patient is evaluated 1/19/2020, will continue cycle #7 FOLFIRI chemotherapy.  However Avastin will be on hold see next section.   · Patient was reevaluated on 2/2/2021, will continue chemotherapy cycle #8 FOLFIRI.  Avastin / biosimilar will be added.    · She [facility] in mid February 2021, and had recommended palliative chemotherapy without surgical intervention of metastatic lung lesions.   · 2/16/2021cycle #9 FOLFIRI plus bevacizumab.  Tolerated last cycle well with only some mild increase in liquid stools.  This was easily controlled with antidiarrheals.  · On 3/2/2021, patient will proceed with cycle #10 FOLFIRI plus bevacizumab.  After 12 cycles, plan to start maintenance treatment.  · 3/16/2021 cycle 11.  Plus bevacizumab.  · 3/30/2021 cycle 12 FOLFIRI plus bevacizumab.  Having issues with worsening nausea despite premeds with dexamethasone,  Aloxi, Emend, and Zofran at home.  Patient is requesting a dose of Phenergan, which is helped her in the past.  We will give this to her with treatment today.  · PET scan examination on 4/6/2021 reported no evidence of hypermetabolic metastatic lesion.  · Discussed with the patient on 4/13/2021, we reviewed the PET scan results.  We recommended to switch to maintenance chemotherapy without irinotecan.   We discussed possibility of switching to oral Xeloda treatment.  Discussed with the patient for side effects more so with hand-foot syndrome.  Patient is agreeable.    2 .  Pulmonary nodule, hypermetabolic on PET scan.   · Her original PET scan examination from 8/8/2019 reported a small 8 mm right upper apex pulmonary nodule but hypermetabolic SUV 5.1.  That was too small to be biopsied, however there was always a possibility of metastatic disease considering that high activity despite a such a small nodule.  · Her chest CT scan examination from 3/13/2020 reported this shrank to 6 mm.    · PET scan examination on 5/21/2020 reported no metabolic activity at this residual nodule.  This needs to be monitored.    · PET scan examination 9/18/2020 reported couple of hypermetabolic pulmonary nodules, besides a few extra small pulmonary nodules.  Those are highly suspicious for metastatic disease.    · PET scan examination obtained on 1/12/2021 after cycle #6 chemotherapy reported improved a pulmonary nodule hypermetabolic activity.  Confirmed these are metastatic lesions.  · 1/19/2021, patient reports she will see thoracic surgeon tomorrow at the [facility] where she seeks second opinion evaluation, and to see whether she would be a candidate for resection of pulmonary nodules.  I discussed with the patient, she had at least 2 hypermetabolic lesions although they responded to chemotherapy, I do not think she would be a candidate for surgery.   · Thoracic surgeon from [facility]  recommended metastectomy and to discontinue chemotherapy afterwards.   · Patient had a third opinion evaluation on 2/11/2021 by telemedicine with [facility] physician, who recommended palliative chemotherapy with no surgical intervention for metastatic pulmonary lesions.    3.   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  Low-dose Lovenox discontinued and initiated Xarelto 20 mg daily.  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  · Patient held Xarelto for 2 days prior to MediPort removal from the left chest wall and placement of new port in the right chest wall on 12/18/2020.  She resumed Xarelto that evening.  · Progressive swelling in the neck, face, upper extremities prompting hospitalization with CT scan showing thrombosis in the right subclavian vein and SVC.  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  Although she had been off of Xarelto for a few days, clearly Xarelto was not prohibiting progression of her thrombosis after she resumed treatment and there was some evidence to suggest thrombosis had been present at least in the innominate vein on prior scan in September but now appears chronic.  Current acute thrombosis involving SVC and right subclavian.  · Patient was admitted and placed on heparin drip  · Patient was evaluated by vascular surgery and intervention was not recommended for thrombolysis/thrombectomy.  · On 12/22/2020,  the patient developed worsening shortness of breath, increasing upper extremity edema consistent with worsening SVC syndrome.  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  · Symptoms improved and patient was discharged 12/24/2020 on Lovenox 100 mg every 12 hours.    · Returns 12/29/2020 for evaluation continuing Lovenox, overall tolerating it well.  No bleeding issues.  · Improved edema 1/5/2021.  Will continue Lovenox weight-based every 12 hours.  · On 1/19/2021 and 2/2/2021, patient reports improved facial swelling.  She however does have being bruise on her abdomen wall where she does Lovenox injection.  However she does not have a spontaneous epistaxis, gum bleeding or other bleeding.   · On 2/2/2021, we discussed that side effects of Avastin/biosimilar related to thrombosis.  Since this patient already has been on Lovenox, I think the benefits from treatment for her cancer will outweigh that risk of thrombosis, will proceed ahead with Avastin.  I cautioned patient to watch out for signs of worsening thrombosis patient voiced understanding.   · On 3/16/2021, no evidence of worsening DVT, continue Lovenox.  · On 4/13/2021, patient tolerates Lovenox anticoagulation with no signs of worsening symptoms.  Continue Lovenox for now.    4.  This patient also has strong family history for malignancy the patient had appointment with genetic counseling on September 3, 2019.  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    5.  Mild anemia, improved since her surgery.    · She also has a history of iron deficiency.  Iron studies reveal a saturation of just 10%.  She was started on oral iron but was unable to tolerate due to GI side effects.   · Status post Injectafer 2/5/2020 and 2/12/2020   · Improved hemoglobin 13.5 on 1/5/2021.  · Hemoglobin 14.7 on 2/16/2021.    · Hemoglobin 16.2 on 3/2/2021.    · 3/16/2020  when hemoglobin now up to 16.2, hematocrit 47.6.  Patient admits that she has started smoking again, and I have encouraged her to quit.  She denies any snoring or sleep apnea diagnosis.  Patient is not taking oral iron.  We will closely monitor.  · 3/30/2020 hemoglobin 15.7, HCT 47.5.  Patient states that she has cut back significantly on her smoking, although not quit yet.  I have encouraged her to continue working on this.  We will continue to closely monitor.  · On 4/13/2021 hemoglobin 15.7.    6.  Peripheral neuropathy secondary to chemotherapy.    · This is mild involving bilateral hands and feet as reported on 9/16/2020.   · Will avoid chemotherapy with oxaliplatin.  · Stable mild neuropathy as of 11/10/2020  · Patient reports worsening peripheral neuropathy and cycle #5 chemotherapy on 12/8/2020 with and without irinotecan.   · On 1/5/2021, patient reports improvement of peripheral neuropathy, will add irinotecan back to chemotherapy.  Continue to monitor and adjust medication.  · On 3/2/2021, patient reports no worsening of peripheral neuropathy after restarting irinotecan.   · Stable peripheral neuropathy is reported today.    7.  History of hand-foot syndrome grade 3.    · It become so significant after cycle #7 FOLFOX treatment.  Discussed with patient on 5/13/2020, will discontinue the bolus 5-FU dose, and also decrease 50% of the infusion dose through the pump.   · We also use Medrol Dosepak for possible recurrent symptomology.  She responded this well.   · Her hand-foot syndrome improved.  · Under current treatment with FOLFIRI, patient not receiving 5-FU bolus.  No recurrent issues.   · Patient will be switched to maintenance chemotherapy using Xeloda in late April 2021.    8.  Hyperlacrimation and mild scleral irritation related to 5-FU.  She has been taking steroid eyedrops.  This has improved her hyperlacrimation.  · Not currently an issue.  Continue to monitor with 5-FU.      9.  Abnormal liver  function panel.  · Improved liver function panel 9/18/2020.  This is probably related to her recent infection.  · She has normalized AST, alk phosphatase, and a much improved only marginally elevated ALT 35 on 10/13/2020.   · Normalized liver function panel on 10/27/2020.    · Normal liver panel on 11/10/2020.    · Normal liver function panel on 12/5/2021.    · Slightly worsening liver function with AST 33 and ALT 56 1/19/2021.    · Improved AST 21 and ALT 39 on 2/2/2021.    · ALT 59 and AST 29 on 3/2/2021.    · ALT 56, AST 24, alk phosphatase 121 and total bilirubin 0.3 on 4/13/2021.  Continue to monitor.      10.  Intermittent leukocytopenia secondary to chemotherapy.   · WBC currently normal at 5220 and the neutrophil 3520 on 1/5/2021.  Continue to monitor.    · On 2/2/2021, WBC 4110 including ANC 2540.  Continue to monitor for now.  Consider G-CSF if neutropenia becomes an ongoing issue.   · 2/16/2021 WBC 4.6, ANC 2.79.    · 3/16/2021 WBC 3.93, ANC 2.26, continue to monitor.  · On 4/13/2021 WBC 4250 including ANC 2640.    11.  Depression.  Patient seen by JULIET Davenport on 11/9/2020.  She was started on mirtazapine.  Lexapro was discontinued.  This has definitely improved her mood with the patient feeling overall much better.  She is sleeping better.  Appetite is improved with her actually eating more than she wants to.    · Condition is stable.  She plans to continue follow-up with supportive oncology.    12.  Intermittent upper abdominal discomfort.  This improves with eating.  After further discussion with the patient she also notes 3 nights of increased reflux when laying down.  We discussed her symptoms could be related to increase stomach acid or potential ulcer.  She is currently taking Pepcid 20 mg daily.  I instructed her to add Prilosec 20 mg daily.   · On 3/16/2021 patient reports some worsening reflux symptoms last week.  She has increased her Pepcid to 20 mg twice daily, which did resolve her  symptoms.  We discussed she can add Prilosec 20 mg daily as well.   · No specific complaints today.  We will continue to monitor.    13.  Proteinuria: 3/30/2020: Patient is 2+ protein in her urine.  We will continue with Avastin and closely monitor.  No need for 24-hour urine at this time.  · Persistent 2+ proteinuria on 4/13/2021.  continue to monitor.    14.  Dysuria is reported on 4/13/2021.  We will start the patient on cefdinir 300 mg twice a day for 5 days.  Will send urine culture.    15.  Tachycardia: Heart rate 124 today, regular.  Patient denies chest pain or palpitations.  She does report some increase in shortness of breath, particularly with exertion, although this is been present since her diagnosis of PE.  Patient states she was on metoprolol at some point, although it was discontinued and she cannot recall why.  We will go ahead and refer her to cardiology for further evaluation.  · Patient was seen by Dr. Bustos cardiologist on 4/6/2021.  We will continue to follow-up in 3 months.      PLAN:  1. Proceed with cycle #1 maintenance palliative chemotherapy with 5-FU infusion plus bevacizumab every 2 weeks.  2. We will seek permission to have Xeloda for future maintenance chemotherapy, 2000 mg twice a day for 7 days on and 7 days off.  3. Will discontinue Aloxi as premedication because of discontinuing irinotecan.  4. Send the urine for culture.  5. Start cefdinir 300 mg twice a day for 5 days.  I E scribed to her pharmacy.  6. Continue Lovenox 100 mg every 12 hours.  7. Patient encouraged to stop smoking.  8. Patient will return for MD reevaluation in 2 weeks for ongoing maintenance chemotherapy.  Hopefully Xeloda will be approved by day.  If not, we will continue maintenance with 5-FU based regimen plus bevacizumab.  9. Continue antidiarrheals and supportive care as above.      I independently reviewed the PET scan images.  I also shared those images together with the PET scan images from 1/12/2021  and 9/18/2020 as well as from 5/21/2020 with the patient and her mother.     More than 45 min were used for patient care, over half of that time were for counseling.    Patient continues on high risk medication requires close monitoring for toxicity.    Alexandru Hunt MD PhD  04/13/21      Addendum:  Urine culture was positive for E. coli, multidrug sensitive.  Continue cefdinir as prescribed.      ALEXANDRU HNUT M.D., Ph.D.            CC:  Deepika Vela III NP-C   MD Perla Bowers MD

## 2021-04-13 NOTE — TELEPHONE ENCOUNTER
Called and left voice mail that labs were normal and if patient had any questions or concerns to call office. Her voice mail had her name in the greeting.    Rozina Quiroz RN  [facility]

## 2021-04-13 NOTE — ASSESSMENT & PLAN NOTE
Patient's depression is recurrent and is mild without psychosis. Their depression is currently in partial remission and the condition is improving with treatment. This will be reassessed at the next regular appointment. F/U as described:patient will continue current medication therapy.

## 2021-04-15 ENCOUNTER — INFUSION (OUTPATIENT)
Dept: ONCOLOGY | Facility: HOSPITAL | Age: 41
End: 2021-04-15

## 2021-04-15 ENCOUNTER — DOCUMENTATION (OUTPATIENT)
Dept: ONCOLOGY | Facility: CLINIC | Age: 41
End: 2021-04-15

## 2021-04-15 DIAGNOSIS — Z45.2 ENCOUNTER FOR FITTING AND ADJUSTMENT OF VASCULAR CATHETER: ICD-10-CM

## 2021-04-15 DIAGNOSIS — C20 ADENOCARCINOMA OF RECTUM (HCC): Primary | ICD-10-CM

## 2021-04-15 LAB — BACTERIA SPEC AEROBE CULT: ABNORMAL

## 2021-04-15 PROCEDURE — 25010000002 HEPARIN LOCK FLUSH PER 10 UNITS: Performed by: INTERNAL MEDICINE

## 2021-04-15 RX ORDER — HEPARIN SODIUM (PORCINE) LOCK FLUSH IV SOLN 100 UNIT/ML 100 UNIT/ML
500 SOLUTION INTRAVENOUS AS NEEDED
Status: DISCONTINUED | OUTPATIENT
Start: 2021-04-15 | End: 2021-04-15 | Stop reason: HOSPADM

## 2021-04-15 RX ORDER — HEPARIN SODIUM (PORCINE) LOCK FLUSH IV SOLN 100 UNIT/ML 100 UNIT/ML
500 SOLUTION INTRAVENOUS AS NEEDED
Status: CANCELLED | OUTPATIENT
Start: 2021-04-15

## 2021-04-15 RX ORDER — SODIUM CHLORIDE 0.9 % (FLUSH) 0.9 %
10 SYRINGE (ML) INJECTION AS NEEDED
Status: DISCONTINUED | OUTPATIENT
Start: 2021-04-15 | End: 2021-04-15 | Stop reason: HOSPADM

## 2021-04-15 RX ORDER — SODIUM CHLORIDE 0.9 % (FLUSH) 0.9 %
10 SYRINGE (ML) INJECTION AS NEEDED
Status: CANCELLED | OUTPATIENT
Start: 2021-04-15

## 2021-04-15 RX ADMIN — SODIUM CHLORIDE, PRESERVATIVE FREE 10 ML: 5 INJECTION INTRAVENOUS at 13:48

## 2021-04-15 RX ADMIN — Medication 500 UNITS: at 13:51

## 2021-04-15 NOTE — PROGRESS NOTES
Staff message rec from Dr Nguyen-pt will need Capecitabine 2000 mg BID 7 days on then 7 days off. She is changing from 5FU infusion to oral Xeloda maintenance.    I have submitted the PA to [facility] through [employer].    Waiting for a decision.    Dong Nguyen MD PhD sent to Priti Echeverria,     Would you please process to order for Xeloda 2000 mg twice a day for 7 days on 7 days off.  We agreed to changing his 5-FU infusion therapy to oral Xeloda as maintenance.     Thank you very much!     Patrick

## 2021-04-16 ENCOUNTER — DOCUMENTATION (OUTPATIENT)
Dept: ONCOLOGY | Facility: CLINIC | Age: 41
End: 2021-04-16

## 2021-04-19 RX ORDER — CAPECITABINE 500 MG/1
TABLET, FILM COATED ORAL
Qty: 112 TABLET | Refills: 3 | Status: SHIPPED | OUTPATIENT
Start: 2021-04-19 | End: 2021-05-11 | Stop reason: SINTOL

## 2021-04-19 NOTE — TELEPHONE ENCOUNTER
Caller: JOHANNA    Relationship: PATIENT    Best call back number: [phone]     Medication needed:   Requested Prescriptions     Pending Prescriptions Disp Refills   • enoxaparin (Lovenox) 100 MG/ML solution syringe 60 mL 2     Sig: Inject 1 mL under the skin into the appropriate area as directed Every 12 (Twelve) Hours.       When do you need the refill by:WITHIN THE WEEK    What additional details did the patient provide when requesting the medication: PATIENT HAS 1 BOX LEFT    Does the patient have less than a 3 day supply:  [] Yes  [x] No    What is the patient's preferred pharmacy:    [facility]/pharmacy #[id number] [place], [place] - [address] [address]. AT [facility] [phone] [facility] [phone]

## 2021-04-19 NOTE — TELEPHONE ENCOUNTER
Capectabine rx routed to Dr Nguyen for signature. Once signed it will be escribed to [facility]   ([facility] is restricted by insurance)

## 2021-04-22 ENCOUNTER — SPECIALTY PHARMACY (OUTPATIENT)
Dept: ONCOLOGY | Facility: HOSPITAL | Age: 41
End: 2021-04-22

## 2021-04-22 NOTE — PROGRESS NOTES
Oral Chemotherapy Teaching      Patient Name/:  Carole Weaver   1980  Oral Chemotherapy Regimen:  Capecitabine 2000 mg po bid for 7 days, then off 7 days and continue Avastin 10 mg/kg IV every 14 days.  Date Started Medication: Plan 21  In person education session  Initial Teaching Follow Up Comments     Safety     Storage instructions (away from children; away from heat/cold, sunlight, or moisture), handling - use of gloves (caregivers), washing hands after touching pills, managing waste     “How are you storing your medications?”, reminders on storage, proper handling (caregivers using gloves, washing hands, away from children, managing waste, etc.), disposal of medication with D/C or dosage change    Storage of Capecitabine at room temp in a dry location, protected from light and secured from children and pets. We discussed the need for caregivers to wear gloves for administration.  She was counseled to bring unused medication to the clinic for proper disposal per SOP by pharmacy.     Adherence      patient and/or caregiver on how to take medication, take with/without food, assess their adherence potential, stress importance of adherence, ways to manage adherence (pill boxes, phone reminders, calendars), what to do if miss a dose   “How are you taking your medication?” “How are you remembering to take your medication?”, “How many doses have you missed?”, determine reasons for non-adherence (not remembering, side effects, etc), ways to improve, overadherence? Remind patient of ways to improve/maintain adherence   Capecitabine 2000 mg po bid for 7 days, then 7 days off should be taken 12 hours apart and within  30 minutes of a meal.  If a dose is missed, do not take an extra dose or two doses at once; simply take the next dose at the regularly scheduled time.  She was given an adherence calendar as well.     Side Effects/Adverse Reactions      patient on potential side effects, s/s, ways to  manage, when to call MD/seek help     Determine if patient experiencing side effects, ways to manage  Potential for low WBCs and infection control precautions such as handwashing, wearing a mask and avoiding crowds was discussed.  She was counseled to call the office for a temp exceeding 100.4.  Anemia and its associated symptoms such as SOA, dizziness and palpitations reviewed. Diarrhea and its management discussed.  She has an rx for Lomotil and uses that successfully for diarrhea control.  Mouth sores and management with baking soda and salt water rinses pc+hs discussed.  Hand foot syndrome and use of moisturizer such as Aquaphor to hands and feet 2-3 times daily discussed.  We also discussed avoidance of hot water and properly fitting foot wear.  We discussed need to monitor her LFTS with lab work.  Nausea and its management with ondansetron, prochlorperazine, or phenergan discussed.  She has these medications available to use at home.  Fatigue and its management with rest breaks discussed.  Hair loss risk 6% reviewed.  Platelet reduction and bleeding precautions discussed.  She was counseled to call the office for excessive bruising, nosebleeds, bleeding with oral care or blood in urine/stool.  We also reviewed fluid retention as a potential side effect.      Miscellaneous     Food interactions, DDIs, financial issues Determine if patient started any new medications since being placed on oral chemo (analyze for DDI) DDI, none requiring a therapy change per Up to Date     Additional Notes:  Ms Weaver has used Avastin in the past and did not require any further education on its use.  She is aware that she needs to answer phone call from [facility] for scheduling a delivery to her home for Capecitabine.  She was given the opportunity to ask questions, then CCA and consents were signed.

## 2021-04-23 ENCOUNTER — DOCUMENTATION (OUTPATIENT)
Dept: SURGERY | Facility: CLINIC | Age: 41
End: 2021-04-23

## 2021-04-23 NOTE — PROGRESS NOTES
1 YR CY, last done 07/15/2020.     04/26/2021, mailed colonoscopy recall letter to patient.     Thank You.

## 2021-04-26 ENCOUNTER — MEDICATION THERAPY MANAGEMENT (OUTPATIENT)
Dept: PHARMACY | Facility: HOSPITAL | Age: 41
End: 2021-04-26

## 2021-04-26 NOTE — PROGRESS NOTES
[facility] telephone encounter re delivery Capectibaine    Ms Weaver reports her medication has been delivered and she plans to start it tomorrow at 10 am. She is aware of her upcoming Avastin infusion tomorrow as well.

## 2021-04-27 ENCOUNTER — OFFICE VISIT (OUTPATIENT)
Dept: ONCOLOGY | Facility: CLINIC | Age: 41
End: 2021-04-27

## 2021-04-27 ENCOUNTER — INFUSION (OUTPATIENT)
Dept: ONCOLOGY | Facility: HOSPITAL | Age: 41
End: 2021-04-27

## 2021-04-27 VITALS
OXYGEN SATURATION: 94 % | RESPIRATION RATE: 16 BRPM | TEMPERATURE: 96.9 F | SYSTOLIC BLOOD PRESSURE: 135 MMHG | BODY MASS INDEX: 32.84 KG/M2 | WEIGHT: 197.1 LBS | HEART RATE: 118 BPM | HEIGHT: 65 IN | DIASTOLIC BLOOD PRESSURE: 96 MMHG

## 2021-04-27 VITALS — DIASTOLIC BLOOD PRESSURE: 85 MMHG | SYSTOLIC BLOOD PRESSURE: 130 MMHG

## 2021-04-27 DIAGNOSIS — C20 ADENOCARCINOMA OF RECTUM (HCC): Primary | ICD-10-CM

## 2021-04-27 DIAGNOSIS — F33.9 MONOPOLAR DEPRESSION (HCC): Chronic | ICD-10-CM

## 2021-04-27 DIAGNOSIS — C78.00 MALIGNANT NEOPLASM METASTATIC TO LUNG, UNSPECIFIED LATERALITY (HCC): ICD-10-CM

## 2021-04-27 DIAGNOSIS — C20 ADENOCARCINOMA OF RECTUM (HCC): ICD-10-CM

## 2021-04-27 DIAGNOSIS — I82.210 THROMBOSIS OF SUPERIOR VENA CAVA (HCC): ICD-10-CM

## 2021-04-27 DIAGNOSIS — N30.00 ACUTE CYSTITIS WITHOUT HEMATURIA: ICD-10-CM

## 2021-04-27 DIAGNOSIS — G62.0 DRUG-INDUCED PERIPHERAL NEUROPATHY (HCC): ICD-10-CM

## 2021-04-27 DIAGNOSIS — D68.51 HETEROZYGOUS FACTOR V LEIDEN MUTATION (HCC): Chronic | ICD-10-CM

## 2021-04-27 DIAGNOSIS — C20 ADENOCARCINOMA OF RECTUM (HCC): Primary | Chronic | ICD-10-CM

## 2021-04-27 DIAGNOSIS — Z79.01 ANTICOAGULATION ADEQUATE: ICD-10-CM

## 2021-04-27 DIAGNOSIS — Z79.01 CHRONIC ANTICOAGULATION: Chronic | ICD-10-CM

## 2021-04-27 LAB
ALBUMIN SERPL-MCNC: 3.8 G/DL (ref 3.5–5.2)
ALBUMIN/GLOB SERPL: 1.1 G/DL (ref 1.1–2.4)
ALP SERPL-CCNC: 116 U/L (ref 38–116)
ALT SERPL W P-5'-P-CCNC: 51 U/L (ref 0–33)
ANION GAP SERPL CALCULATED.3IONS-SCNC: 13.1 MMOL/L (ref 5–15)
AST SERPL-CCNC: 25 U/L (ref 0–32)
BASOPHILS # BLD AUTO: 0.03 10*3/MM3 (ref 0–0.2)
BASOPHILS NFR BLD AUTO: 0.7 % (ref 0–1.5)
BILIRUB SERPL-MCNC: 0.4 MG/DL (ref 0.2–1.2)
BILIRUB UR QL STRIP: ABNORMAL
BUN SERPL-MCNC: 6 MG/DL (ref 6–20)
BUN/CREAT SERPL: 6.2 (ref 7.3–30)
CALCIUM SPEC-SCNC: 9.5 MG/DL (ref 8.5–10.2)
CHLORIDE SERPL-SCNC: 100 MMOL/L (ref 98–107)
CLARITY UR: ABNORMAL
CO2 SERPL-SCNC: 23.9 MMOL/L (ref 22–29)
COLOR UR: YELLOW
CREAT SERPL-MCNC: 0.97 MG/DL (ref 0.6–1.1)
DEPRECATED RDW RBC AUTO: 60.5 FL (ref 37–54)
EOSINOPHIL # BLD AUTO: 0.11 10*3/MM3 (ref 0–0.4)
EOSINOPHIL NFR BLD AUTO: 2.4 % (ref 0.3–6.2)
ERYTHROCYTE [DISTWIDTH] IN BLOOD BY AUTOMATED COUNT: 16.7 % (ref 12.3–15.4)
GFR SERPL CREATININE-BSD FRML MDRD: 63 ML/MIN/1.73
GLOBULIN UR ELPH-MCNC: 3.5 GM/DL (ref 1.8–3.5)
GLUCOSE SERPL-MCNC: 129 MG/DL (ref 74–124)
GLUCOSE UR STRIP-MCNC: NEGATIVE MG/DL
HCT VFR BLD AUTO: 46.9 % (ref 34–46.6)
HGB BLD-MCNC: 16.5 G/DL (ref 12–15.9)
HGB UR QL STRIP.AUTO: ABNORMAL
IMM GRANULOCYTES # BLD AUTO: 0.01 10*3/MM3 (ref 0–0.05)
IMM GRANULOCYTES NFR BLD AUTO: 0.2 % (ref 0–0.5)
KETONES UR QL STRIP: NEGATIVE
LEUKOCYTE ESTERASE UR QL STRIP.AUTO: ABNORMAL
LYMPHOCYTES # BLD AUTO: 0.87 10*3/MM3 (ref 0.7–3.1)
LYMPHOCYTES NFR BLD AUTO: 19 % (ref 19.6–45.3)
MCH RBC QN AUTO: 34.8 PG (ref 26.6–33)
MCHC RBC AUTO-ENTMCNC: 35.2 G/DL (ref 31.5–35.7)
MCV RBC AUTO: 98.9 FL (ref 79–97)
MONOCYTES # BLD AUTO: 0.35 10*3/MM3 (ref 0.1–0.9)
MONOCYTES NFR BLD AUTO: 7.6 % (ref 5–12)
NEUTROPHILS NFR BLD AUTO: 3.22 10*3/MM3 (ref 1.7–7)
NEUTROPHILS NFR BLD AUTO: 70.1 % (ref 42.7–76)
NITRITE UR QL STRIP: NEGATIVE
NRBC BLD AUTO-RTO: 0 /100 WBC (ref 0–0.2)
PH UR STRIP.AUTO: 6 [PH] (ref 4.5–8)
PLATELET # BLD AUTO: 180 10*3/MM3 (ref 140–450)
PMV BLD AUTO: 9.5 FL (ref 6–12)
POTASSIUM SERPL-SCNC: 3.7 MMOL/L (ref 3.5–4.7)
PROT SERPL-MCNC: 7.3 G/DL (ref 6.3–8)
PROT UR QL STRIP: ABNORMAL
RBC # BLD AUTO: 4.74 10*6/MM3 (ref 3.77–5.28)
SODIUM SERPL-SCNC: 137 MMOL/L (ref 134–145)
SP GR UR STRIP: 1.02 (ref 1–1.03)
UROBILINOGEN UR QL STRIP: ABNORMAL
WBC # BLD AUTO: 4.59 10*3/MM3 (ref 3.4–10.8)

## 2021-04-27 PROCEDURE — 81003 URINALYSIS AUTO W/O SCOPE: CPT

## 2021-04-27 PROCEDURE — 80053 COMPREHEN METABOLIC PANEL: CPT

## 2021-04-27 PROCEDURE — 25010000002 BEVACIZUMAB PER 10 MG: Performed by: INTERNAL MEDICINE

## 2021-04-27 PROCEDURE — 99215 OFFICE O/P EST HI 40 MIN: CPT | Performed by: INTERNAL MEDICINE

## 2021-04-27 PROCEDURE — 85025 COMPLETE CBC W/AUTO DIFF WBC: CPT

## 2021-04-27 PROCEDURE — 25010000002 BEVACIZUMAB 100 MG/4ML SOLUTION 4 ML VIAL: Performed by: INTERNAL MEDICINE

## 2021-04-27 PROCEDURE — 96413 CHEMO IV INFUSION 1 HR: CPT

## 2021-04-27 RX ORDER — SODIUM CHLORIDE 9 MG/ML
250 INJECTION, SOLUTION INTRAVENOUS ONCE
Status: CANCELLED | OUTPATIENT
Start: 2021-04-27

## 2021-04-27 RX ORDER — SODIUM CHLORIDE 9 MG/ML
250 INJECTION, SOLUTION INTRAVENOUS ONCE
Status: COMPLETED | OUTPATIENT
Start: 2021-04-27 | End: 2021-04-27

## 2021-04-27 RX ADMIN — SODIUM CHLORIDE 250 ML: 9 INJECTION, SOLUTION INTRAVENOUS at 12:18

## 2021-04-27 RX ADMIN — BEVACIZUMAB 900 MG: 400 INJECTION, SOLUTION INTRAVENOUS at 12:18

## 2021-04-28 ENCOUNTER — MEDICATION THERAPY MANAGEMENT (OUTPATIENT)
Dept: PHARMACY | Facility: HOSPITAL | Age: 41
End: 2021-04-28

## 2021-04-28 ENCOUNTER — DOCUMENTATION (OUTPATIENT)
Dept: ONCOLOGY | Facility: CLINIC | Age: 41
End: 2021-04-28

## 2021-04-28 NOTE — PROGRESS NOTES
[facility] lab review ( Capecitabine+Avastin)        4/27/2021  Day 1   WBC 3.40 - 10.80 10*3/mm3 4.59   Neutrophils Absolute 1.70 - 7.00 10*3/mm3 3.22   Hemoglobin 12.0 - 15.9 g/dL 16.5 (A)   Hematocrit 34.0 - 46.6 % 46.9 (A)   Platelets 140 - 450 10*3/mm3 180   Creatinine 0.60 - 1.10 mg/dL 0.97   eGFR Non African Am >60 mL/min/1.73 63   BUN 6 - 20 mg/dL 6   Sodium 134 - 145 mmol/L 137   Potassium 3.5 - 4.7 mmol/L 3.7   Glucose 74 - 124 mg/dL 129 (A)   Calcium 8.5 - 10.2 mg/dL 9.5   Albumin 3.50 - 5.20 g/dL 3.80   Total Protein 6.3 - 8.0 g/dL 7.3   AST (SGOT) 0 - 32 U/L 25   ALT (SGPT) 0 - 33 U/L 51 (A)   Alkaline Phosphatase 38 - 116 U/L 116   Total Bilirubin 0.2 - 1.2 mg/dL 0.4   Protein, UA Negative 100 mg/dL (2+) (A)     Initiated Avastin infusions with Capecitabine 2000 mg po bid for 7 days off, then 7 days off yesterday.  Pharmacy will continue to follow.

## 2021-04-29 ENCOUNTER — IMMUNIZATION (OUTPATIENT)
Dept: VACCINE CLINIC | Facility: HOSPITAL | Age: 41
End: 2021-04-29

## 2021-04-29 PROCEDURE — 91300 HC SARSCOV02 VAC 30MCG/0.3ML IM: CPT | Performed by: INTERNAL MEDICINE

## 2021-04-29 PROCEDURE — 0001A: CPT | Performed by: INTERNAL MEDICINE

## 2021-05-03 ENCOUNTER — TELEPHONE (OUTPATIENT)
Dept: ONCOLOGY | Facility: HOSPITAL | Age: 41
End: 2021-05-03

## 2021-05-03 ENCOUNTER — OFFICE VISIT (OUTPATIENT)
Dept: ONCOLOGY | Facility: CLINIC | Age: 41
End: 2021-05-03

## 2021-05-03 ENCOUNTER — APPOINTMENT (OUTPATIENT)
Dept: ONCOLOGY | Facility: HOSPITAL | Age: 41
End: 2021-05-03

## 2021-05-03 ENCOUNTER — TELEPHONE (OUTPATIENT)
Dept: ONCOLOGY | Facility: CLINIC | Age: 41
End: 2021-05-03

## 2021-05-03 VITALS
TEMPERATURE: 97.5 F | HEART RATE: 110 BPM | DIASTOLIC BLOOD PRESSURE: 89 MMHG | WEIGHT: 194.9 LBS | HEIGHT: 65 IN | RESPIRATION RATE: 16 BRPM | OXYGEN SATURATION: 96 % | BODY MASS INDEX: 32.47 KG/M2 | SYSTOLIC BLOOD PRESSURE: 122 MMHG

## 2021-05-03 DIAGNOSIS — L27.1 HAND FOOT SYNDROME: ICD-10-CM

## 2021-05-03 DIAGNOSIS — C78.00 MALIGNANT NEOPLASM METASTATIC TO LUNG, UNSPECIFIED LATERALITY (HCC): Primary | ICD-10-CM

## 2021-05-03 LAB
BASOPHILS # BLD AUTO: 0.02 10*3/MM3 (ref 0–0.2)
BASOPHILS NFR BLD AUTO: 0.4 % (ref 0–1.5)
DEPRECATED RDW RBC AUTO: 57 FL (ref 37–54)
EOSINOPHIL # BLD AUTO: 0.11 10*3/MM3 (ref 0–0.4)
EOSINOPHIL NFR BLD AUTO: 2.3 % (ref 0.3–6.2)
ERYTHROCYTE [DISTWIDTH] IN BLOOD BY AUTOMATED COUNT: 15.9 % (ref 12.3–15.4)
HCT VFR BLD AUTO: 47.5 % (ref 34–46.6)
HGB BLD-MCNC: 16.5 G/DL (ref 12–15.9)
IMM GRANULOCYTES # BLD AUTO: 0.02 10*3/MM3 (ref 0–0.05)
IMM GRANULOCYTES NFR BLD AUTO: 0.4 % (ref 0–0.5)
LYMPHOCYTES # BLD AUTO: 1.68 10*3/MM3 (ref 0.7–3.1)
LYMPHOCYTES NFR BLD AUTO: 34.5 % (ref 19.6–45.3)
MCH RBC QN AUTO: 33.5 PG (ref 26.6–33)
MCHC RBC AUTO-ENTMCNC: 34.7 G/DL (ref 31.5–35.7)
MCV RBC AUTO: 96.5 FL (ref 79–97)
MONOCYTES # BLD AUTO: 0.48 10*3/MM3 (ref 0.1–0.9)
MONOCYTES NFR BLD AUTO: 9.9 % (ref 5–12)
NEUTROPHILS NFR BLD AUTO: 2.56 10*3/MM3 (ref 1.7–7)
NEUTROPHILS NFR BLD AUTO: 52.5 % (ref 42.7–76)
NRBC BLD AUTO-RTO: 0 /100 WBC (ref 0–0.2)
PLATELET # BLD AUTO: 282 10*3/MM3 (ref 140–450)
PMV BLD AUTO: 9.3 FL (ref 6–12)
RBC # BLD AUTO: 4.92 10*6/MM3 (ref 3.77–5.28)
WBC # BLD AUTO: 4.87 10*3/MM3 (ref 3.4–10.8)

## 2021-05-03 PROCEDURE — 85025 COMPLETE CBC W/AUTO DIFF WBC: CPT | Performed by: INTERNAL MEDICINE

## 2021-05-03 PROCEDURE — 99214 OFFICE O/P EST MOD 30 MIN: CPT | Performed by: NURSE PRACTITIONER

## 2021-05-03 NOTE — PROGRESS NOTES
REASON FOR FOLLOW UP:     1. Rectal cancer, rectal ultrasound examination in July 2019 reported T3N0 disease without lymphadenopathy. She does have small pulmonary nodule 6-7 mm and 2 mm with indeterminate feature. There is no solid evidence of metastatic disease otherwise. Patient has stage IIA (T3N0M0) disease.  2. The patient is heterozygous factor V Leiden, was on prophylactic anticoagulation with Lovenox 40 mg daily given her increased risk of thrombosis with MediPort and GI malignancy.   3. PET scan on 8/8/2019 reported an 8 mm hypermetabolic right upper lobe pulmonary nodule, which is suspicious for metastatic as well.    4. Patient had a PowerPort placement on the left upper chest by Dr. Joseph on 8/9/2019.  5. Patient was started on concurrent infusional 5-FU chemoradiation therapy on 8/12/2019, with planned complete surgical resection and further adjuvant chemotherapy with intention to cure the disease.   6. Patient underwent surgical resection of the primary rectal cancer by Dr. Ye on 12/2/2019.  Pathology evaluation reported residual T3N1 disease stage IIIb.  7. Diarrhea related to therapy and radiation.   8. Patient was started cycle 1 day 1 of adjuvant FOLFOX 6 on 1/23/2020.  9. On 2/5/2020 FOLFOX 6 cycle 2 delayed secondary to neutropenia.  Patient was given 3 days of Granix injection.  After cycle #2 we planned 3 days of Granix with each cycle.   10. Patient also intolerant of oral iron.  Patient received 2 doses of IV injectafer on 02/05/2020 and 02/12/2020.   11. 02/12/2020 Proceed with cycle #2 of FOLFOX 6 with 3 days of Granix.  12. On 3/11/2020 cycle 4 postponed secondary to abdominal pain and occasional low-grade fevers.  CT scans ordered.  13. Cycle #4 resumed after CT scan revealed no evidence of disease.  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 Ye. patient has no fever.  Continue to observe.   14. Grade 3  hand-foot syndrome secondary to 5-FU after cycle #7 FOLFOX 6 chemotherapy, prompting ER visit.  Also has worsening peripheral neuropathy.   15. Cycle #8 FOLFOX 6 was given on 5/13/2020, with 50% dose reduction for both 5-FU and oxaliplatin, and also examination of bolus 5-FU.   16. PET scan examination on 5/21/2020 reported no evidence of metastatic disease in the chest abdomen pelvis.  Stable 6 mm RUL pulmonary nodule.  17. On 5/27/2020, I discussed with the patient that we can discontinue chemotherapy at this time.   18. Patient had a surgical procedure for low anterior colon resection, coloanal anastomosis on 8/3/2020.  19. CT scan for chest abdomen pelvis on 9/9/2020 reported a new 8 mm noncalcified pulmonary nodule in the left lower lobe of the lung.  Otherwise stable right upper lobe 6 mm pulmonary nodule, and no evidence of disease recurrence in the abdomen or pelvis.  20. PET scan examination on 9/18/2020 reported multiple hypermetabolic small pulmonary nodules/ new pulmonary nodules.   21. Patient was started on pelvic chemotherapy with FOLFIRI regimen on 10/13/2020.   22. 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 It reported tumor mutation burden 5 Muts/Mb, microsatellite stable, TP53 mutation R282W, and others.   23. Cycle #5 was without irinotecan, due to peripheral neuropathy.  24. 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  Patient was switched to weight-based Lovenox injection.  25. Cycle #6 5-FU and irinotecan was delayed by 2 weeks because of the above incident.  26. Patient had a telemedicine evaluation at that the [facility] in February 2021.  They agreed with our treatment plan for palliative chemotherapy followed by maintenance chemotherapy.       HISTORY OF PRESENT ILLNESS:  The patient is a 40 y.o. female with the above-mentioned  history, who was brought into the office today for triage visit.  She recently moved to maintenance therapy with Avastin along with Xeloda 2000 mg 7 days on, 7 days off.  She initiated Xeloda 4/27/2021 at which time she also received Avastin in the infusion area.  She called the on-call provider on Saturday, 5/1/2021 with reports of significant pain which she rated a 10 out of 10 in her feet, inability to walk and redness of her skin.  It was recommended she hold the remaining 2 days of her Xeloda.  She was therefore brought into the office today for review.   She reports since holding Xeloda she has had improvement nearly 70% resolution of the pain in her feet.  She has been using emollient cream with Aquaphor.  She continues to have some erythema of her feet and hands though feels the swelling is much improved.  She does struggle with diarrhea which is been an ongoing issue with all chemotherapy.  She does not feel this worsened with the addition of Xeloda.  She denies nausea.  She denies signs or symptoms of bleeding.      Past Medical History:   Diagnosis Date   • Abdominopelvic abscess (CMS/HCC) 08/12/2020    ADMITTED TO [facility]   • Abnormal Pap smear of cervix 02/02/1998    JULIUS I   • Anemia in pregnancy    • Anxiety    • Bilateral epiphora 04/2020   • Bilateral hand swelling 05/02/2020    SEEN AT [facility] ER   • Cervical lymphadenitis 06/06/2012    SEEN AT [facility] ER   • Chemotherapy induced neutropenia (CMS/HCC) 04/2020   • Chemotherapy-induced nausea 02/2020   • Chemotherapy-induced thrombocytopenia 05/2020   • Chronic diarrhea    • Colon polyps     FOLLOWED BY DR. GERONIMO ESPARZA   • Cystitis 04/24/2020    WITH DEHYDRATION, ADMITTED TO [facility]   • Cystitis 10/27/2020   • Drug-induced peripheral neuropathy (CMS/HCC) 05/2020   • Fistula of intestine    • GERD (gastroesophageal reflux disease)    • Hand foot syndrome 05/2020   • Heart murmur     IN CHILDHOOD   • Hematochezia    • Hemorrhoids    • Heterozygous factor V Leiden  mutation (CMS/HCC)     DX 2019   • History of anemia    • History of chemotherapy     FOLLOWED BY DR. ALEXANDRU HUNT   • History of gestational diabetes    • History of pre-eclampsia    • History of radiation therapy     FOLLOWED BY DR. JAVON LEWIS   • History of TB skin testing 2009    TB Skin Test   • HPV in female    • Hypokalemia 2019   • Hypomagnesemia 2019   • IBS (irritable bowel syndrome)    • Ileostomy in place (CMS/[facility])     FOLLOWED BY DR. GERONIMO YE   • Infected insect bite of neck 2016    SEEN AT [facility]   • Kidney stone    • Kidney stones 2007    SEEN AT [facility] ER   • Lump of right breast     SWOLLEN LYMPH NODE   • Lung cancer (CMS/HCC) 2020    METASTATIC LUNG CANCER   • Monopolar depression (CMS/HCC)    • On anticoagulant therapy    • Perirectal abscess 2020   • PIH (pregnancy induced hypertension)    • Pulmonary embolism without acute cor pulmonale (CMS/HCC) 09/20/2019    X 3; ADMITTED TO [facility]   • Pulmonary nodule, right 2020   • Rectal cancer (CMS/HCC) 2019    STAGE IIA, INVASIVE MODERATELY DIFFERENTIATED ADENOCARCINOMA, CHEMO AND XRT FINISHED 2019   • Right shoulder pain 2020    SEEN AT [facility]   • Right ureteral stone 10/01/2019    SEEN AT [facility]   • SAB (spontaneous ) 1996     A2-1 INDUCED   • Sciatica    • Sepsis due to cellulitis (CMS/HCC) 2002    LEFT GREAT TOE, ADMITTED TO [facility]   • Tachycardia 2020   • Urinary urgency 2020     Past Surgical History:   Procedure Laterality Date   • CHOLECYSTECTOMY N/A 10/10/2003    LAPAROSCOPIC WITH CHOLANGIOGRAM, DR. JAMEY TALAVERA AT [facility]   • COLON RESECTION N/A 2019    Procedure: laparoscopic low anterior colon resection with mobilization of splenic flexure and diverting loop ileostomy: ERAS;  Surgeon: Geronimo Ye MD;  Location: [facility];  Service: General   • COLON RESECTION N/A 8/3/2020    Procedure: LOW ANTERIOR COLON RESECTION,  COLOANAL ANASTOMOSIS, MOBILIZATION SPLENIC FLEXURE;  Surgeon: Geronimo Ye MD;  Location: [facility] OR;  Service: General;  Laterality: N/A;   • COLONOSCOPY N/A 7/15/2020    PATENT ANASTAMOSIS IN MID RECTUM, RESCOPE IN 1 YR, DR. GERONIMO YE AT [facility]   • COLONOSCOPY W/ POLYPECTOMY N/A 07/08/2019    15 MM TUBULOVILLOUS ADENOMA POLYP IN HEPATIC FLEXURE, 20 MMTUBULOVILLOUS ADENOMA WITH HIGH GRADE DYSPLASIA IN RECTOSIGMOID, 4 CM MASS IN MID RECTUM, PATH: INVASIVE MODERATELY DIFFERENTIATED ADENOCARCINOMA, DR. JENNIFER LI AT [facility]   • DILATATION AND EVACUATION N/A 2009   • ENDOSCOPY N/A 07/08/2019    LA GRADE A ESOPHAGITIS, GASTRITIS, ALL BIOPSIES BENIGN, DR. JENNIFER LI AT [facility]   • INCISION AND DRAINAGE PERIRECTAL ABSCESS N/A 8/14/2020    Procedure: INCISION AND DRAINAGE OF retrorectal dehiscence abcess with drain placement and irrigation;  Surgeon: Geronimo Ye MD;  Location: [facility] OR;  Service: General;  Laterality: N/A;   • PAP SMEAR N/A 01/24/2014   • SIGMOIDOSCOPY N/A 7/24/2019    INFILTRATIVE PARTIALLY OBSTRUCTING LARGE RECTAL CANCER, AREA TATOOED, DR. GERONIMO YE AT [facility]   • SIGMOIDOSCOPY N/A 11/23/2019    AN ULCERATED PARTIALLY OBSTRUCTING MASS IN MID RECTUM, AREA TATTOOED, DR. GERONIMO YE AT [facility]   • TRANSRECTAL ULTRASOUND N/A 7/24/2019    Procedure: ULTRASOUND TRANSRECTAL;  Surgeon: Geronimo Ye MD;  Location: [facility] ENDOSCOPY;  Service: General   • URETEROSCOPY LASER LITHOTRIPSY WITH STENT INSERTION Right 10/30/2019    DR. ESTUARDO BEASLEY AT [facility]   • VAGINAL DELIVERY N/A 09/18/1998   • VENOUS ACCESS DEVICE (PORT) INSERTION Left 8/9/2019    Procedure: INSERTION VENOUS ACCESS DEVICE;  Surgeon: Sj Joseph MD;  Location: [facility];  Service: General   • VENOUS ACCESS DEVICE (PORT) INSERTION N/A 12/18/2020    Procedure: INSERTION VENOUS ACCESS DEVICE right side, removal venous access device left side;  Surgeon: Sj Joseph MD;  Location:   [facility];  Service: General;  Laterality: N/A;   • WISDOM TOOTH EXTRACTION Bilateral 1993       HEMATOLOGIC/ONCOLOGIC HISTORY:      The patient reports she has intermittent rectal bleeding and urgency, mucous with her stool, starting sometime in 2016. At that time she was referred to GI service, and was diagnosed of irritable bowel syndrome. Nevertheless she had worsening urgency for bowel movement, and had a couple of incidents losing control of stool. She was recently seen by Roland Thorpe MD again and had colonoscopy and EGD exam on 07/08/2019. She was found having a circumferential rectal mass. According to the procedure note, the patient had a fungating circumferential bleeding 4 cm mass in the middle rectum, at distance between 13 cm and 17 cm from the anus. Mass was causing partial obstruction. There were also colon polyps found at the hepatic flexure and also at the rectosigmoid junction 23 cm from the anus. Both were resected and retrieved. EGD examination reported grade A esophagitis at the GE junction and patchy discontinuous erythema and aggravation of the mucosa without bleeding in the stomach body. There is normal mucosa of the duodenum. Pathology evaluation from this procedure reported moderately differentiated adenocarcinoma involving the rectal mass. The rectal sigmoid junction polyp was tubular/tubulovillous adenoma with high grade dysplasia negative for invasive malignancy. The hepatic flexure polyp was also tubular/tubulovillous adenoma negative for high grade dysplasia or malignancy. The biopsy from the esophagus reports squamocolumnar mucosa with inflammatory changes suggestive of mild reflux esophagitis, negative for interstitial metaplasia. Gastric biopsy was benign and duodenal biopsy was also benign. There is no mention of H-pylori.     Patient was subsequently referred to colorectal surgeon Padmaja Ye MD for further evaluation. The patient had CT scan examination for chest, abdomen and  pelvis requested by Dr. Ye and were done on 07/13/2019. The study reported no evidence of lymphadenopathy in the abdomen and pelvis. There was wall thickening of the rectosigmoid junction. Normal spleen, pancreas, adrenal glands and kidneys. There was fatty liver infiltration but no focal lymphatic lesions. There was a small 6-7 mm noncalcified nodule in the right upper lobe and a tiny 3 mm subpleural nodule in the right middle lobe. No mediastinal or hilar lymphadenopathy.     Dr. Ye performed staging rectal ultrasound examination. This study reported tumor penetrating through the muscularis propria as T3 disease; there were no lymph nodes identified.    She had a hospitalization in late September 2019 because of newly discovered pulmonary emboli.  She was on prophylactic Lovenox prior to the incident of PE.  Now she is on full dose Xarelto anticoagulation.  Patient reports no further chest pain dyspnea.  She denies bleeding or bruising.  During her hospitalization, she was seen by Dr. Ye, who plans to have surgery 8 to 12 weeks after finishing radiation therapy.  She finished her radiation on 9/19/2019.     I noticed patient also presented to the emergency room on 10/1/2019 complaining of right flank area pain.  I reviewed the images studies and indeed she has a very small 1 to 2 mm obstructing kidney stone in the UV junction.  Patient is still symptomatic with some pains and dysuria.  She denies fever sweating or chills.    Laboratory study on 10/7/2019 reported normal CBC including hemoglobin 13.1, platelets 301,000, WBC 6170 and ANC 4900 lymphocytes 590 monocytes 480.      The nurse reported malfunction of port-a-catheter on 10/7/2019.  Port study on 10/8/2019 showed fibrin sheath around the distal aspect of the Mediport catheter in the SVC. This does not appear to hinder injection, but does prevent aspiration at this time.    Patient underwent surgical resection of the colon on 12/2/2019 with   [facility].  Pathology evaluation reported residual T3 disease, also 1 out of 14 lymph nodes positive for malignancy.  So this patient in either had at least stage IIIb disease (T3 N1 M0?).      Adjuvant chemotherapy FOLFOX 6 will be started on 1/22/2020.  Laboratory study reported iron saturation 10%, free iron 31 TIBC 319 and ferritin 168.  Her hemoglobin was 11.8, WBC 5600, and platelets 347,000.    Patient was here on 02/12/2020 for cycle 2 of her FOLFOX.  This is delayed x1 week secondary to neutropenia.  The patient ultimately received 3 days worth of Neupogen with recovery of her blood counts.  Of note, the patient struggled with significant nausea without any episodes of vomiting following cycle #1 of chemotherapy resulting in significant weight loss.  She is up 12 pounds over the last week as her appetite has normalized.  We will add Emend to her care plan.    She is having several loose stools per her colostomy and has been hesitant to take Imodium due to prior history of constipation.  I encouraged her to try this with a maximum of 8 tablets/day.  She denies any infectious symptoms including fevers or chills.  No excess bleeding or bruising noted.  She had the expected cold sensitivity related to the Oxaliplatin for about 3 days following treatment.    Labs from 02/12/2020 demonstrated total white blood cell count of 5.14, ANC of 3.06, hemoglobin of 11.2, platelets of 211,000.  She was found to be iron deficient last week and is intolerant to oral iron secondary to GI distress.  For this reason, she initiated IV iron therapy with Injectafer last week.  She had received her second dose 02/12/2020    Patient has normalized hemoglobin 12.2 on 2/26/2020.    She reported on 5/5/2020 she had a recent visit to the emergency department for what was suspected to be an allergic reaction.  She called our on-call service on Saturday with reports of hand and face swelling.  She was instructed to proceed to the emergency  department at which time she was assessed and prescribed a Medrol Dosepak.  She had just completed her 5-FU and was unhooked on Friday, 5/3/2020.  Her symptoms have improved.  She does report persistent hyperpigmentation and mild swelling of the palms of the hands but this is much improved.  It was her right hand specifically that was swollen.  Her facial swelling has resolved.  She continues on Cefdinir nd since has 1 day remaining, she was prescribed cefdinir for a UTI requiring hospitalization at the end of April.  Reports no new symptoms.  Her labs are stable.  She is scheduled for treatment again.    Patient states at this time she is not tolerating her chemotherapy well.     Patient seen in the emergency department on 5/2/2020 for what was suspected to be an allergic reaction.  She called our on-call service on Saturday explaining that she was experiencing hand and face swelling.  She was instructed to proceed to the emergency department at which time she was assessed and prescribed a Medrol Dosepak.  She had just completed her 5-FU and was unhooked on Friday, 5/1/2020.      She reports since her ED visit on 5/2/2020 her symptoms have not improved. Her hands and feet were swollen upon presenting to the ED and she could barely make a fist. She states that she feels so swollen she is not able to stand it. She states that her skin on the hands and feet are peeling extensively as well, besides changing color to more dark.     She also reports significant fatigue after her first week of the 5-FU treatment but she expected this side effect. She also notices significant increase in her neuropathy to the point that she is not able to even walk around in her home without her house slippers due to irritation from her rugs. She denies and associated nausea or vomiting at this time. She also has episodes of epistaxis every day after the chemotherapy cycle #7.  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    Laboratory studies, 5/13/2020, show moderate thrombocytopenia platelets 123,000, low normal WBC 4140 including ANC 2720 and normal hemoglobin 13.4.  Because significant hand-foot syndrome, will decrease both 5-FU and oxaliplatin by 50%, and eliminate bolus dose of 5-FU.    On 5/21/2020 patient had a PET scan performed which showed no convincing evidence of residual disease in abdomen or pelvis, no metastatic disease within the chest or neck.     Cycle #8 FOLFOX 6 was given on 5/13/2020, with 50% dose reduction for both 5-FU and oxaliplatin, and also examination of bolus 5-FU.  She states on 5/27/2020 that with the recent reduction of the chemotherapy she feels significantly better. She has more energy and is able to do her daily routine.      PET scan examination on 5/21/2020 reported no evidence of metastatic disease in chest abdomen pelvis.  There was a stable 6 mm right upper lobe pulmonary nodule.    Laboratory studies on 5/27/2020 showed mild leukocytopenia WBC 3720 but a normal ANC 2250 and lymphocytes 630.  Normal platelets 163,000 and hemoglobin 12.6.  Chemistry lab reported normal renal function, liver function panel and a electrolytes, elevated glucose 164.    Laboratory studies 6/24/2020, showed normal hemoglobin 13.4 but macrocytosis .9.  Normal platelets 210,000 and WBC 5870.  She had normal CMP.     Patient last time was here in late June 2020.  Since that time she had surgical procedure for low anterior colon resection, coloanal anastomosis on 8/3/2020.  She later developed a perirectal abscess, required surgical drainage on 8/14/2020.  She was discharged on 8/27/2020.    Patient reports to me she still has lower abdominal wall vacuum suction in place.  She denies fever sweating chills.  Performance status is ECOG 1.  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  She does have visiting nurse come to the home for wound care.    CT scan for chest abdomen  pelvis on 9/9/2020 reported a new 8 mm noncalcified pulmonary nodule in the left lower lobe.  Otherwise stable right upper lobe 6 mm pulmonary nodule, and no evidence of disease recurrence in the abdomen or pelvis.     Laboratory study on 9/16/2020 reported elevated AST 55, ALT 95, alk phosphatase 143 but normal total bilirubin 0.3.  Chemistry lab otherwise unremarkable.  She has completed normal CBC.      Because of the abnormal CT scan examination for chest abdomen pelvis on 9/9/2020 reported a new 8 mm noncalcified pulmonary nodule in the left lower lobe, we obtained a PET scan examination for further evaluation, which was done on 9/18/2020.  This study reported several pulmonary nodules, some of them are hypermetabolic, newly developed compared to previous PET scan in May 2020.  Certainly does highly suspicious for metastatic disease.  There are also hypermetabolic activity in the abdomen and pelvic area which is hard to differentiate from surgical scar versus metastatic malignancy.    Laboratory study on 10/13/2020 reported normal CBC with Hb 13.9, platelets 302,000 and WBC 5520 including ANC 3830.  Chemistry lab reported normalized AST 20, alk phosphatase 116 improved ALT 35, and maintains normal bilirubin, with normal electrolytes and liver function panel.     Patient was started on first cycle of palliative chemotherapy FOLFIRI on 10/13/2020.    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  Patient had a new port placed 12/18/2020, and had held her Xarelto for 2 days prior to surgery.  She presented to the ER on 12/21/20 with complaints of facial and arm swelling for 3 days.  She also noted increased shortness of breath.  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  Thrombus within the right internal jugular vein cannot  be excluded.  Patient was started on IV heparin, and eventually transitioned to weight-based Lovenox, which she now continues.      MEDICATIONS    Current Outpatient Medications:   •  capecitabine (XELODA) 500 MG chemo tablet, Take 4 tabs (2000 mg) po twice per day for 7 days on then 7 days off., Disp: 112 tablet, Rfl: 3  •  clonazePAM (KlonoPIN) 1 MG tablet, TAKE 1 TABLET BY MOUTH THREE TIMES A DAY AS NEEDED FOR ANXIETY OR SEIZURES, Disp: 90 tablet, Rfl: 0  •  dicyclomine (BENTYL) 20 MG tablet, TAKE 1 TABLET BY MOUTH EVERY 6 HOURS AS NEEDED, Disp: 360 tablet, Rfl: 0  •  diphenoxylate-atropine (LOMOTIL) 2.5-0.025 MG per tablet, TAKE 1 TABLET BY MOUTH 4 (FOUR) TIMES A DAY AS NEEDED FOR DIARRHEA., Disp: 120 tablet, Rfl: 1  •  enoxaparin (Lovenox) 100 MG/ML solution syringe, Inject 1 mL under the skin into the appropriate area as directed Every 12 (Twelve) Hours., Disp: 60 mL, Rfl: 2  •  escitalopram (LEXAPRO) 10 MG tablet, TAKE 1 TABLET BY MOUTH EVERY DAY, Disp: 30 tablet, Rfl: 5  •  famotidine (PEPCID) 20 MG tablet, TAKE 1 TABLET BY MOUTH TWICE A DAY, Disp: 180 tablet, Rfl: 1  •  Loratadine (CLARITIN) 10 MG capsule, Take 10 mg by mouth Every Evening., Disp: , Rfl:   •  mineral oil-hydrophilic petrolatum (AQUAPHOR) ointment, Apply 1 application topically to the appropriate area as directed As Needed for Dry Skin., Disp: , Rfl:   •  ondansetron (ZOFRAN) 8 MG tablet, Take 1 tablet by mouth 3 (Three) Times a Day As Needed for Nausea or Vomiting., Disp: 60 tablet, Rfl: 5  •  prochlorperazine (COMPAZINE) 10 MG tablet, Take 1 tablet by mouth Every 6 (Six) Hours As Needed for Nausea or Vomiting., Disp: 30 tablet, Rfl: 2  •  promethazine (PHENERGAN) 25 MG tablet, Take 1 tablet by mouth Every 6 (Six) Hours As Needed for Nausea or Vomiting., Disp: 60 tablet, Rfl: 2    ALLERGIES:   No Known Allergies    SOCIAL HISTORY:       Social History     Tobacco Use   • Smoking status: Current Every Day Smoker     Packs/day: 0.50      "Years: 24.00     Pack years: 12.00     Types: Electronic Cigarette, Cigarettes   • Smokeless tobacco: Never Used   Vaping Use   • Vaping Use: Some days   • Substances: Nicotine   • Devices: Disposable   Substance Use Topics   • Alcohol use: Not Currently     Comment: Caffeine use rare   • Drug use: No         FAMILY HISTORY:   Mother has positive factor V Leiden mutation, although she did not have thrombosis, mother also is coronary disease with stenting, she also is occasional bruising.  Maternal grandmother had DVT, she had multiple surgical procedures.  Patient mother's half-brother had metastatic colon cancer at diagnosis in his 50s.  Maternal great aunt has breast cancer.  Patient will follow his skin cancer in his 60s, exclusive.         Vitals:    05/03/21 1241   BP: 122/89   Pulse: 110   Resp: 16   Temp: 97.5 °F (36.4 °C)   SpO2: 96%   Weight: 88.4 kg (194 lb 14.4 oz)   Height: 166 cm (65.35\")   PainSc: 0-No pain     Current Status 5/3/2021   ECOG score 0     Physical Exam  Vitals reviewed.   Constitutional:       General: She is not in acute distress.     Appearance: Normal appearance. She is well-developed.   HENT:      Head: Normocephalic and atraumatic.   Eyes:      Pupils: Pupils are equal, round, and reactive to light.   Cardiovascular:      Rate and Rhythm: Regular rhythm. Tachycardia present.      Heart sounds: Normal heart sounds. No murmur heard.     Pulmonary:      Effort: Pulmonary effort is normal. No respiratory distress.      Breath sounds: Normal breath sounds. No wheezing.   Abdominal:      General: Bowel sounds are normal. There is no distension.      Palpations: Abdomen is soft. There is no mass.      Comments: Ostomy in place with liquid brown stool.  Scattered bruises on the abdomen bilaterally from Lovenox injections.   Musculoskeletal:      Cervical back: Neck supple.      Right lower leg: No edema.      Left lower leg: No edema.   Lymphadenopathy:      Cervical: No cervical " adenopathy.   Skin:     General: Skin is warm and dry.      Findings: No lesion or rash.      Comments: Erythema of the palms and feet, with tenderness of the feet, no peeling noted   Neurological:      Mental Status: She is alert and oriented to person, place, and time.     05/03/2021 physical exam is unchanged from above except as updated.    RECENT LABS:  Results from last 7 days   Lab Units 05/03/21  1225 04/27/21  1020   WBC 10*3/mm3 4.87 4.59   NEUTROS ABS 10*3/mm3 2.56 3.22   HEMOGLOBIN g/dL 16.5* 16.5*   HEMATOCRIT % 47.5* 46.9*   PLATELETS 10*3/mm3 282 180     Results from last 7 days   Lab Units 04/27/21  1020   SODIUM mmol/L 137   POTASSIUM mmol/L 3.7   CHLORIDE mmol/L 100   CO2 mmol/L 23.9   BUN mg/dL 6   CREATININE mg/dL 0.97   CALCIUM mg/dL 9.5   ALBUMIN g/dL 3.80   BILIRUBIN mg/dL 0.4   ALK PHOS U/L 116   ALT (SGPT) U/L 51*   AST (SGOT) U/L 25   GLUCOSE mg/dL 129*           Assessment/Plan      ASSESSMENT:   1.  Rectal cancer, rectal ultrasound examination reported T3N0 disease without lymphadenopathy.   · CT scan of chest, abdomen and pelvis reported no lymphadenopathy in the abdomen, pelvis or chest. She does have small pulmonary nodule 6-7 mm and 2 mm with indeterminate feature. There is no solid evidence of metastatic disease otherwise.   · Based on the CT scan and rectal ultrasound imaging studies, this patient had stage IIA (T3N0M0) disease.    · She had PET scan examination on 8/8/2019 which reported a hypermetabolic right upper lobe pulmonary nodule 6 mm with SUV 5.6.  This is suspicious for metastatic disease however it is too small to be biopsied.  This patient may have stage IV disease.   · She initiated concurrent radiation with continuous 5-FU on 8/12/2019.  · Patient finished concurrent chemoradiation therapy.  · Patient underwent surgical resection of the rectal tumor and diverting loop ileostomy on 12/2/2019 with Dr. Ye.  Pathology evaluation reported residual T3 disease, with 1 out  of 14 lymph nodes positive for malignancy.  Certainly this patient has at least stage IIIb rectal cancer (T3 N1 M0?)  · On 1/22/2020 adjuvant chemotherapy FOLFOX 6 regimen initiated.    · On 2/5/2022 cycle #2 was delayed secondary to neutropenia.  She was given 3 days of Granix.   · Emend added with cycle 3.  With improved nausea control  · Continuing home Granix x3 days following 5-FU disconnect  · 3/11/2020 due for cycle 4, however, she is experiencing progressive abdominal pain and occasional fevers.   · CT scan performed on 3/13/2020 reveals no evidence of progressive or recurrent disease.  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  · Patient hospitalized 4/24-4/26/20 after cycle 5 of chemotherapy with acute UTI.  CT abdomen/pelvis noting fluid collection in the presacral region having diminished in size compared to CT dated 3/13/2020.  Patient was evaluated by Dr. Ye while in the hospital with further plans to evaluate possible colovaginal fistula following completion of chemotherapy.  Patient did respond to IV antibiotics and discharged home on oral cefdinir.  · 4/29/2020 cycle 6 of FOLFOX.  Urinary symptoms have resolved   · Patient seen in the [facility] ED on 5/2/2020 for what was suspected to be an allergic reaction.  She called our service on Saturday explaining that she was experiencing hand and face swelling.  She was instructed to proceed to the emergency department at which time she was assessed and prescribed a Medrol Dosepak.  She had just completed her 5-FU and was unhooked on Friday, 5/1/2020.  Her symptoms have resolved in the office on assessment, 5/5/2020.  · The patient had grade 3 hand-foot syndrome based on symptomology.  Patient had cycle #8 of 5-FU on 5/13/2020. Due to her symptoms and poor tolerance to the 5-FU, her treatment dose will be reduced 50% for oxaliplatin and infusional 5-FU.  Bolus 5-FU will be discontinued..  · On 5/13/2020  We discussed further treatment options pending  the scan results.  If she has indeed residual disease or metastatic disease, we will switch her to irinotecan plus Avastin or anti-EGFR monoclonal antibody.  She will need a further molecular testing of her tumor samples in that case.  · On 5/21/2020 patient had a PET scan and it showed no evidence of of metastatic disease in the neck, chest, abdomen or pelvis.  There was fluid accumulation/abscess.   · On 5/27/2020 I discussed with the patient that we can discontinue chemotherapy at this time.  She will follow-up with Dr. Ye to discuss a possibility to reverse the ileostomy.  We likely will obtain anther PET scan in 3 to 4 months for reassessment.    · Patient was seen by Dr. Ye on 6/19/2019 for evaluation and to discuss possible take down of her ileostomy.  She is scheduled to have a gastrografin enema on 7/2/2020 to evaluate for a fistula, and then a colonoscopy on 7/15/2020, both done by Dr. Ye.  She states that based on the above imaging and procedure findings, she may have a more extensive surgery done or just have her ileostomy reversed, which would likely be done in August 2020.  This will all be coordinated under the care of Dr. Ye.     · I reviewed scan results with the patient on 9/16/2020 for the most recent CT scan from 09/09/2020 and also compared to her previous PET scan examination from 05/21/2020 and also the original PET scan from 08/09/2019.  The original PET scan there is a small right upper lobe pulmonary nodule 6 mm with SUV 5.6. So in the 05/2020 PET scan the nodule is still there but activity seems much less and this most recent CT scan examination also documented the preexisting small pulmonary nodule however there is a new left lower lobe pulmonary nodule 8 mm in size and I shared with the patient today this nodule is suspicious for metastatic disease. Laboratory studies reported minimal CEA level 1.32 on 09/09/2020. Liver function panel was only marginally abnormal with the ALT  45, the remaining is normal. However laboratory study today showed worsening AST 55, ALT 95 and alkaline phosphatase 143 but is still normal, total bilirubin 0.3.   · So I discussed with the patient on 9/16/2020 recommended to have repeat PET scan examination for assessment because the left lower lobe pulmonary nodule is too small to be biopsied, and if the PET scan reports hypermetabolic lesion, I recommended to have stereotactic radiation therapy. Explained to patient that she is not a really good candidate to have thoracotomy for resection because she still has unhealed wound in the abdomen. I think the stereotactic radiation therapy will be a more feasible choice.  · Laboratory study on 9/16/2020 reported worsening liver function panel with both elevated AST, ALT and also slightly worsened alkaline phosphatase despite having normal total bilirubin. I recommended to repeat laboratory study for reevaluation. The only new medication she has in the past several days is Augmentin but otherwise no change of condition. She denies any recent viral infection. We will monitor this.   · PET scan examination obtained on 9/18/2020 showed metastatic disease.  It reported a few hypermetabolic pulmonary nodules, and some new small pulmonary nodules, all of them highly suspicious for metastatic disease.  She also has hypermetabolic activity in the abdomen and pelvic area which could be related to scar tissue from her recent surgery versus metastatic disease.  Nevertheless overall picture fits with metastatic cancer.  · Discussed with the patient on 9/20/2020, we reviewed the PET scan images together, and I recommended to have systematic chemotherapy, I do not think stereotactic reading therapy to the hypermetabolic lesions in the lungs warranted at this time because even those will be treated with reading therapy, she will still need systematic chemotherapy.  Because of her peripheral neuropathy from oxaliplatin, I recommended  using FOLFIRI.  I did discuss with the patient using anti-EGFR monoclonal antibody versus anti-VEGF monoclonal antibody.     · Palliative chemotherapy cycle#1 FOLFIRI was started on 10/13/2020.  No bolus 5-FU given considering previous poor tolerance.    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  Microsatellite stable, mutation burden 5/Mb.    · Discussed with the patient 10/27/2020, because of the [place] mutation, she is not a candidate for anti-EGFR monoclonal antibody such as Erbitux or vectibix.  She could be a candidate for anti-VEGF monoclonal antibody, however because of active wound, she is not a candidate right now at this moment.  · Patient seen in the ED for acute right neck pain on 11/16/2020.  CT angiogram as well as CT of the cervical spine performed with no acute findings but notably one of her pulmonary nodules, left upper lobe, somewhat decreased in size.  Patient given prednisone pack.  Further details below.  · Patient due today for cycle #4 FOLFIRI.  Plan repeat PET scan after cycle 6.  · Last received cycle #5 chemotherapy without irinotecan on 12/8/2020.   · Patient here 12/29/2020 for evaluation and consideration of cycle 6, but she continues to complain of swelling.  She does have swelling typically after treatment as well.  We will hold treatment for 1 week and reevaluate next week.  Still planning on PET scan following cycle 6.   · Cycle #6 chemotherapy will be resumed on 1/5/2021.  Started back on original irinotecan.  · PET scan examination obtained on 1/12/2021 after cycle #6 chemotherapy reported improved pulmonary nodule hypermetabolic activity.  Confirmed these are metastatic lesions.  · Patient is evaluated 1/19/2020, will continue cycle #7 FOLFIRI chemotherapy.  However Avastin will be on hold see next section.   · Patient was reevaluated on 2/2/2021, will continue chemotherapy cycle #8 FOLFIRI.  Avastin / biosimilar will be added.    · She talked to  [facility] specialist in mid February 2021, and had recommended palliative chemotherapy without surgical intervention of metastatic lung lesions.   · 2/16/2021cycle #9 FOLFIRI plus bevacizumab.  Tolerated last cycle well with only some mild increase in liquid stools.  This was easily controlled with antidiarrheals.  · On 3/2/2021, patient will proceed with cycle #10 FOLFIRI plus bevacizumab.  After 12 cycles, plan to start maintenance treatment.  · 3/16/2021 cycle 11.  Plus bevacizumab.  · 3/30/2021 cycle 12 FOLFIRI plus bevacizumab.  Having issues with worsening nausea despite premeds with dexamethasone, Aloxi, Emend, and Zofran at home.  Patient is requesting a dose of Phenergan, which is helped her in the past.  We will give this to her with treatment today.  · PET scan examination on 4/6/2021 reported no evidence of hypermetabolic metastatic lesion.  · Discussed with the patient on 4/13/2021, we reviewed the PET scan results.  We recommended to switch to maintenance chemotherapy without irinotecan.  We will continue 5-FU and Avastin every 2 weeks.  We discussed possibility of switching to oral Xeloda treatment.  Discussed with the patient for side effects more so with hand-foot syndrome.  Patient is agreeable.   · Xeloda 2000 mg twice daily 7 days on, 7 days off along with Avastin initiated 4/27/2021.  · After only 5 doses of Xeloda significant hand-foot syndrome, Xeloda held.  Dose adjustment versus transition to 5-FU will be further discussed with Dr. Nguyen    2 .  Pulmonary nodule, hypermetabolic on PET scan.   · Her original PET scan examination from 8/8/2019 reported a small 8 mm right upper apex pulmonary nodule but hypermetabolic SUV 5.1.  That was too small to be biopsied, however there was always a possibility of metastatic disease considering that high activity despite a such a small nodule.  · Her chest CT scan examination from 3/13/2020 reported this shrank to 6 mm.     · PET scan examination on 5/21/2020 reported no metabolic activity at this residual nodule.  This needs to be monitored.    · PET scan examination 9/18/2020 reported couple of hypermetabolic pulmonary nodules, besides a few extra small pulmonary nodules.  Those are highly suspicious for metastatic disease.    · PET scan examination obtained on 1/12/2021 after cycle #6 chemotherapy reported improved a pulmonary nodule hypermetabolic activity.  Confirmed these are metastatic lesions.  · 1/19/2021, patient reports she will see thoracic surgeon tomorrow at the [facility] where she seeks second opinion evaluation, and to see whether she would be a candidate for resection of pulmonary nodules.  I discussed with the patient, she had at least 2 hypermetabolic lesions although they responded to chemotherapy, I do not think she would be a candidate for surgery.   · Thoracic surgeon from [facility] recommended metastectomy and to discontinue chemotherapy afterwards.   · Patient had a third opinion evaluation on 2/11/2021 by telemedicine with [facility] physician, who recommended palliative chemotherapy with no surgical intervention for metastatic pulmonary lesions.    3.   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  Low-dose Lovenox discontinued and initiated Xarelto 20 mg daily.  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  · Patient held Xarelto for 2 days prior to MediPort removal from the  left chest wall and placement of new port in the right chest wall on 12/18/2020.  She resumed Xarelto that evening.  · Progressive swelling in the neck, face, upper extremities prompting hospitalization with CT scan showing thrombosis in the right subclavian vein and SVC.  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  Although she had been off of Xarelto for a few days, clearly Xarelto was not prohibiting progression of her thrombosis after she resumed treatment and there was some evidence to suggest thrombosis had been present at least in the innominate vein on prior scan in September but now appears chronic.  Current acute thrombosis involving SVC and right subclavian.  · Patient was admitted and placed on heparin drip  · Patient was evaluated by vascular surgery and intervention was not recommended for thrombolysis/thrombectomy.  · On 12/22/2020, the patient developed worsening shortness of breath, increasing upper extremity edema consistent with worsening SVC syndrome.  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  · Symptoms improved and patient was discharged 12/24/2020 on Lovenox 100 mg every 12 hours.    · Returns 12/29/2020 for evaluation continuing Lovenox, overall tolerating it well.  No bleeding issues.  · Improved edema 1/5/2021.  Will continue Lovenox weight-based every 12 hours.  · On 1/19/2021 and 2/2/2021, patient reports improved facial swelling.  She however does have being bruise on her abdomen wall where she does Lovenox injection.  However she does not have a spontaneous epistaxis, gum bleeding or other bleeding.   · On 2/2/2021, we discussed that side effects of Avastin/biosimilar related to thrombosis.  Since this patient already has been on Lovenox, I think the benefits from treatment for her cancer  will outweigh that risk of thrombosis, will proceed ahead with Avastin.  I cautioned patient to watch out for signs of worsening thrombosis patient voiced understanding.   · On 3/16/2021, no evidence of worsening DVT, continue Lovenox.    4.  This patient also has strong family history for malignancy the patient had appointment with genetic counseling on September 3, 2019.  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    5.  Mild anemia, improved since her surgery.    · She also has a history of iron deficiency.  Iron studies reveal a saturation of just 10%.  She was started on oral iron but was unable to tolerate due to GI side effects.   · Status post Injectafer 2/5/2020 and 2/12/2020   · Improved hemoglobin 13.5 on 1/5/2021.  · Hemoglobin 14.7 on 2/16/2021.    · Hemoglobin 16.2 on 3/2/2021.    · 3/16/2020 when hemoglobin now up to 16.2, hematocrit 47.6.  Patient admits that she has started smoking again, and I have encouraged her to quit.  She denies any snoring or sleep apnea diagnosis.  Patient is not taking oral iron.  We will closely monitor.  · 3/30/2020 hemoglobin 15.7, HCT 47.5.  Patient states that she has cut back significantly on her smoking, although not quit yet.  I have encouraged her to continue working on this.  We will continue to closely monitor.  · Hemoglobin 16.5 today    6.  Peripheral neuropathy secondary to chemotherapy.    · This is mild involving bilateral hands and feet as reported on 9/16/2020.   · Will avoid chemotherapy with oxaliplatin.  · Stable mild neuropathy as of 11/10/2020  · Patient reports worsening peripheral neuropathy and cycle #5 chemotherapy on 12/8/2020 with and without irinotecan.   · On 1/5/2021, patient reports improvement of peripheral neuropathy, will add irinotecan back to chemotherapy.  Continue to monitor and adjust medication.  · On 3/2/2021, patient reports no worsening of peripheral neuropathy after restarting irinotecan.   · Stable peripheral neuropathy is  reported today.    7.  History of hand-foot syndrome grade 3.    · It become so significant after cycle #7 FOLFOX treatment.  Discussed with patient on 5/13/2020, will discontinue the bolus 5-FU dose, and also decrease 50% of the infusion dose through the pump.   · We also use Medrol Dosepak for possible recurrent symptomology.  She responded this well.   · Her hand-foot syndrome improved.  · Under current treatment with FOLFIRI, patient not receiving 5-FU bolus.  No recurrent issues.   · Patient will be switched to maintenance chemotherapy using Xeloda in late April 2021.  · Following 5 doses of Xeloda, significant hand-foot syndrome with pain in the feet, significant erythema.  Xeloda held.  Will be further discussed with Dr. Nguyen to determine if the patient will resume 5-FU versus a dose reduction to Xeloda.    8.  Hyperlacrimation and mild scleral irritation related to 5-FU.  She has been taking steroid eyedrops.  This has improved her hyperlacrimation.  · Not currently an issue.  Continue to monitor with 5-FU.      9.  Abnormal liver function panel.  · Improved liver function panel 9/18/2020.  This is probably related to her recent infection.  · She has normalized AST, alk phosphatase, and a much improved only marginally elevated ALT 35 on 10/13/2020.    · Normalized liver function panel on 10/27/2020.    · Normal liver panel on 11/10/2020.    · Normal liver function panel on 12/5/2021.    · Slightly worsening liver function with AST 33 and ALT 56 1/19/2021.    · Improved AST 21 and ALT 39 on 2/2/2021.    · ALT 59 and AST 29 on 3/2/2021.    · ALT 56, AST 24, alk phosphatase 121 and total bilirubin 0.3 on 4/13/2021.  Continue to monitor.      10.  Intermittent leukocytopenia secondary to chemotherapy.   · WBC currently normal at 5220 and the neutrophil 3520 on 1/5/2021.  Continue to monitor.    · On 2/2/2021, WBC 4110 including ANC 2540.  Continue to monitor for now.  Consider G-CSF if neutropenia becomes an  ongoing issue.   · 2/16/2021 WBC 4.6, ANC 2.79.    · 3/16/2021 WBC 3.93, ANC 2.26, continue to monitor.  · On 4/13/2021 WBC 4250 including ANC 2640.    11.  Depression.  Patient seen by JULIET Davenport on 11/9/2020.  She was started on mirtazapine.  Lexapro was discontinued.  This has definitely improved her mood with the patient feeling overall much better.  She is sleeping better.  Appetite is improved with her actually eating more than she wants to.    · Condition is stable.  She plans to continue follow-up with supportive oncology.    12.  Intermittent upper abdominal discomfort.  This improves with eating.  After further discussion with the patient she also notes 3 nights of increased reflux when laying down.  We discussed her symptoms could be related to increase stomach acid or potential ulcer.  She is currently taking Pepcid 20 mg daily.  I instructed her to add Prilosec 20 mg daily.   · On 3/16/2021 patient reports some worsening reflux symptoms last week.  She has increased her Pepcid to 20 mg twice daily, which did resolve her symptoms.  We discussed she can add Prilosec 20 mg daily as well.   · No specific complaints today.  We will continue to monitor.    13.  Proteinuria: 3/30/2020: Patient is 2+ protein in her urine.  We will continue with Avastin and closely monitor.  No need for 24-hour urine at this time.  · Persistent 2+ proteinuria on 4/13/2021.  continue to monitor.    14.  Dysuria is reported on 4/13/2021.  We will start the patient on cefdinir 300 mg twice a day for 5 days.  Will send urine culture.      15.  Tachycardia: Heart rate 124 today, regular.  Patient denies chest pain or palpitations.  She does report some increase in shortness of breath, particularly with exertion, although this is been present since her diagnosis of PE.  Patient states she was on metoprolol at some point, although it was discontinued and she cannot recall why.  We will go ahead and refer her to cardiology for  further evaluation.  · Patient was seen by Dr. Bustos cardiologist on 4/6/2021.  We will continue to follow-up in 3 months.      PLAN:  1. Xeloda will remain on hold secondary to progressive hand-foot syndrome  2. Continue emollient cream with Aquaphor  3. Continue Lovenox 100 mg every 12 hours.  4. Continue antidiarrheals and supportive care as above.    5. Currently scheduled for follow-up 5/11/2021 at which time she is due for Avastin  6. Prior to follow-up, we will determine if the patient is going to continue Xeloda at a reduced dose or resume 5-FU secondary to significant hand-foot syndrome.    Patient continues on high risk medication requires close monitoring for toxicity.  Unfortunately, currently experiencing toxicity to Xeloda with grade 4 hand-foot syndrome    Patience Lorenzo, APRN  05/03/21    ADDENDUM:  Reviewed today, 5/5/2021 with Dr. Nguyen, the patient will reduce Xeloda dosing to 1500 mg twice daily 1 week on, 1 week off.  If she continues to struggle with significant hand-foot syndrome, we would resume infusional 5-FU with cycle 3.    CC:  Deepika Vela III NP-C   MD Perla Bowers MD

## 2021-05-03 NOTE — TELEPHONE ENCOUNTER
Pt called today stating she started her xeloda on Tuesday last week and by Saturday, her hands and feet were burning her and hurting her so bad, she couldn't walk. She says she called the on call doc and thinks she spoke to Dr. De Dios and he told her it was hand/foot and to hold her xeloda and call this morning to see what to do next. Spoke with Dana RODRIGUEZ and she wants to see her today at 1. Message sent to the appt desk.

## 2021-05-03 NOTE — TELEPHONE ENCOUNTER
Pt had chemo on Tue 4-27, woke up Sat and couldn't walk was in so much pain with her feet and hands.  Talked to on call doctor told her to stop taking her meds and check with Dr. Nguyen today.  She said they were still bothering her when she went to bed last night but woke up 70% better and can walk today.  What should she do about her meds.    She also had the Pfizer vaccine on Thursday.

## 2021-05-06 ENCOUNTER — TELEPHONE (OUTPATIENT)
Dept: SURGERY | Facility: CLINIC | Age: 41
End: 2021-05-06

## 2021-05-06 NOTE — TELEPHONE ENCOUNTER
Dr. Ye & Carla,  Patient states has ileostomy in place since 12/2019, but since 4:30 am has had 6 bowel movements via rectally and she wasn't able to control it, a lot came out, states had 2 days of stomach pain.  No fever, the only thing that has started last week Tuesday on chemo pills, and last week Thursday had her 1st COVID vaccine.    Does she need an O.V.?    Contact cell #[phone].    Please advice.

## 2021-05-06 NOTE — TELEPHONE ENCOUNTER
Phoned patient and left voice message letting her know that Dr. Ye reply with most likely it was overflow from ileostomy going through the rest of the colon and she not be concerned, also mentioned if she had additional questions to call the office.    Thank you.

## 2021-05-11 ENCOUNTER — OFFICE VISIT (OUTPATIENT)
Dept: ONCOLOGY | Facility: CLINIC | Age: 41
End: 2021-05-11

## 2021-05-11 ENCOUNTER — INFUSION (OUTPATIENT)
Dept: ONCOLOGY | Facility: HOSPITAL | Age: 41
End: 2021-05-11

## 2021-05-11 VITALS
SYSTOLIC BLOOD PRESSURE: 135 MMHG | OXYGEN SATURATION: 95 % | DIASTOLIC BLOOD PRESSURE: 89 MMHG | RESPIRATION RATE: 16 BRPM | HEART RATE: 108 BPM | BODY MASS INDEX: 32.14 KG/M2 | TEMPERATURE: 97.3 F | HEIGHT: 65 IN | WEIGHT: 192.9 LBS

## 2021-05-11 DIAGNOSIS — C20 ADENOCARCINOMA OF RECTUM (HCC): Primary | ICD-10-CM

## 2021-05-11 DIAGNOSIS — L27.1 PALMAR PLANTAR ERYTHRODYSAESTHESIA DUE TO CYTOTOXIC THERAPY: ICD-10-CM

## 2021-05-11 DIAGNOSIS — C78.00 MALIGNANT NEOPLASM METASTATIC TO LUNG, UNSPECIFIED LATERALITY (HCC): ICD-10-CM

## 2021-05-11 DIAGNOSIS — C20 ADENOCARCINOMA OF RECTUM (HCC): Primary | Chronic | ICD-10-CM

## 2021-05-11 DIAGNOSIS — C20 ADENOCARCINOMA OF RECTUM (HCC): ICD-10-CM

## 2021-05-11 DIAGNOSIS — Z79.01 CHRONIC ANTICOAGULATION: Chronic | ICD-10-CM

## 2021-05-11 LAB
ALBUMIN SERPL-MCNC: 3.8 G/DL (ref 3.5–5.2)
ALBUMIN/GLOB SERPL: 1.1 G/DL (ref 1.1–2.4)
ALP SERPL-CCNC: 106 U/L (ref 38–116)
ALT SERPL W P-5'-P-CCNC: 57 U/L (ref 0–33)
ANION GAP SERPL CALCULATED.3IONS-SCNC: 11.9 MMOL/L (ref 5–15)
AST SERPL-CCNC: 34 U/L (ref 0–32)
BASOPHILS # BLD AUTO: 0.02 10*3/MM3 (ref 0–0.2)
BASOPHILS NFR BLD AUTO: 0.3 % (ref 0–1.5)
BILIRUB SERPL-MCNC: 0.3 MG/DL (ref 0.2–1.2)
BILIRUB UR QL STRIP: ABNORMAL
BUN SERPL-MCNC: 12 MG/DL (ref 6–20)
BUN/CREAT SERPL: 14.5 (ref 7.3–30)
CALCIUM SPEC-SCNC: 9.5 MG/DL (ref 8.5–10.2)
CHLORIDE SERPL-SCNC: 100 MMOL/L (ref 98–107)
CLARITY UR: CLEAR
CO2 SERPL-SCNC: 23.1 MMOL/L (ref 22–29)
COLOR UR: YELLOW
CREAT SERPL-MCNC: 0.83 MG/DL (ref 0.6–1.1)
DEPRECATED RDW RBC AUTO: 58 FL (ref 37–54)
EOSINOPHIL # BLD AUTO: 0.16 10*3/MM3 (ref 0–0.4)
EOSINOPHIL NFR BLD AUTO: 2.5 % (ref 0.3–6.2)
ERYTHROCYTE [DISTWIDTH] IN BLOOD BY AUTOMATED COUNT: 16 % (ref 12.3–15.4)
GFR SERPL CREATININE-BSD FRML MDRD: 76 ML/MIN/1.73
GLOBULIN UR ELPH-MCNC: 3.4 GM/DL (ref 1.8–3.5)
GLUCOSE SERPL-MCNC: 136 MG/DL (ref 74–124)
GLUCOSE UR STRIP-MCNC: NEGATIVE MG/DL
HCT VFR BLD AUTO: 47.1 % (ref 34–46.6)
HGB BLD-MCNC: 16.1 G/DL (ref 12–15.9)
HGB UR QL STRIP.AUTO: ABNORMAL
IMM GRANULOCYTES # BLD AUTO: 0.02 10*3/MM3 (ref 0–0.05)
IMM GRANULOCYTES NFR BLD AUTO: 0.3 % (ref 0–0.5)
KETONES UR QL STRIP: NEGATIVE
LEUKOCYTE ESTERASE UR QL STRIP.AUTO: ABNORMAL
LYMPHOCYTES # BLD AUTO: 1.56 10*3/MM3 (ref 0.7–3.1)
LYMPHOCYTES NFR BLD AUTO: 24.4 % (ref 19.6–45.3)
MCH RBC QN AUTO: 33.7 PG (ref 26.6–33)
MCHC RBC AUTO-ENTMCNC: 34.2 G/DL (ref 31.5–35.7)
MCV RBC AUTO: 98.5 FL (ref 79–97)
MONOCYTES # BLD AUTO: 0.52 10*3/MM3 (ref 0.1–0.9)
MONOCYTES NFR BLD AUTO: 8.1 % (ref 5–12)
NEUTROPHILS NFR BLD AUTO: 4.11 10*3/MM3 (ref 1.7–7)
NEUTROPHILS NFR BLD AUTO: 64.4 % (ref 42.7–76)
NITRITE UR QL STRIP: NEGATIVE
NRBC BLD AUTO-RTO: 0 /100 WBC (ref 0–0.2)
PH UR STRIP.AUTO: 6 [PH] (ref 4.5–8)
PLATELET # BLD AUTO: 200 10*3/MM3 (ref 140–450)
PMV BLD AUTO: 9 FL (ref 6–12)
POTASSIUM SERPL-SCNC: 3.8 MMOL/L (ref 3.5–4.7)
PROT SERPL-MCNC: 7.2 G/DL (ref 6.3–8)
PROT UR QL STRIP: ABNORMAL
RBC # BLD AUTO: 4.78 10*6/MM3 (ref 3.77–5.28)
SODIUM SERPL-SCNC: 135 MMOL/L (ref 134–145)
SP GR UR STRIP: 1.02 (ref 1–1.03)
UROBILINOGEN UR QL STRIP: ABNORMAL
WBC # BLD AUTO: 6.39 10*3/MM3 (ref 3.4–10.8)

## 2021-05-11 PROCEDURE — 96416 CHEMO PROLONG INFUSE W/PUMP: CPT

## 2021-05-11 PROCEDURE — 80053 COMPREHEN METABOLIC PANEL: CPT

## 2021-05-11 PROCEDURE — 25010000002 LEUCOVORIN CALCIUM PER 50 MG: Performed by: NURSE PRACTITIONER

## 2021-05-11 PROCEDURE — 96413 CHEMO IV INFUSION 1 HR: CPT

## 2021-05-11 PROCEDURE — 25010000002 DEXAMETHASONE SODIUM PHOSPHATE 100 MG/10ML SOLUTION: Performed by: NURSE PRACTITIONER

## 2021-05-11 PROCEDURE — 96375 TX/PRO/DX INJ NEW DRUG ADDON: CPT

## 2021-05-11 PROCEDURE — 25010000002 BEVACIZUMAB 100 MG/4ML SOLUTION 4 ML VIAL: Performed by: NURSE PRACTITIONER

## 2021-05-11 PROCEDURE — 25010000002 FLUOROURACIL PER 500 MG: Performed by: NURSE PRACTITIONER

## 2021-05-11 PROCEDURE — 81003 URINALYSIS AUTO W/O SCOPE: CPT

## 2021-05-11 PROCEDURE — 85025 COMPLETE CBC W/AUTO DIFF WBC: CPT

## 2021-05-11 PROCEDURE — 99215 OFFICE O/P EST HI 40 MIN: CPT | Performed by: NURSE PRACTITIONER

## 2021-05-11 PROCEDURE — 25010000002 BEVACIZUMAB PER 10 MG: Performed by: NURSE PRACTITIONER

## 2021-05-11 PROCEDURE — 96367 TX/PROPH/DG ADDL SEQ IV INF: CPT

## 2021-05-11 RX ORDER — PALONOSETRON 0.05 MG/ML
0.25 INJECTION, SOLUTION INTRAVENOUS ONCE
Status: CANCELLED | OUTPATIENT
Start: 2021-05-11

## 2021-05-11 RX ORDER — ATROPINE SULFATE 0.4 MG/ML
0.25 AMPUL (ML) INJECTION
Status: DISCONTINUED | OUTPATIENT
Start: 2021-05-11 | End: 2021-05-11

## 2021-05-11 RX ORDER — SODIUM CHLORIDE 9 MG/ML
250 INJECTION, SOLUTION INTRAVENOUS ONCE
Status: COMPLETED | OUTPATIENT
Start: 2021-05-11 | End: 2021-05-11

## 2021-05-11 RX ORDER — PALONOSETRON 0.05 MG/ML
0.25 INJECTION, SOLUTION INTRAVENOUS ONCE
Status: DISCONTINUED | OUTPATIENT
Start: 2021-05-11 | End: 2021-05-11 | Stop reason: DRUGHIGH

## 2021-05-11 RX ORDER — SODIUM CHLORIDE 9 MG/ML
250 INJECTION, SOLUTION INTRAVENOUS ONCE
Status: CANCELLED | OUTPATIENT
Start: 2021-05-11

## 2021-05-11 RX ADMIN — SODIUM CHLORIDE 810 MG: 900 INJECTION, SOLUTION INTRAVENOUS at 16:28

## 2021-05-11 RX ADMIN — SODIUM CHLORIDE 250 ML: 900 INJECTION, SOLUTION INTRAVENOUS at 14:33

## 2021-05-11 RX ADMIN — DEXAMETHASONE SODIUM PHOSPHATE 12 MG: 10 INJECTION, SOLUTION INTRAMUSCULAR; INTRAVENOUS at 15:08

## 2021-05-11 RX ADMIN — BEVACIZUMAB 900 MG: 400 INJECTION, SOLUTION INTRAVENOUS at 15:53

## 2021-05-11 RX ADMIN — FLUOROURACIL 4850 MG: 50 INJECTION, SOLUTION INTRAVENOUS at 17:01

## 2021-05-11 NOTE — PROGRESS NOTES
REASON FOR FOLLOW UP:     1. Rectal cancer, rectal ultrasound examination in July 2019 reported T3N0 disease without lymphadenopathy. She does have small pulmonary nodule 6-7 mm and 2 mm with indeterminate feature. There is no solid evidence of metastatic disease otherwise. Patient has stage IIA (T3N0M0) disease.  2. The patient is heterozygous factor V Leiden, was on prophylactic anticoagulation with Lovenox 40 mg daily given her increased risk of thrombosis with MediPort and GI malignancy.   3. PET scan on 8/8/2019 reported an 8 mm hypermetabolic right upper lobe pulmonary nodule, which is suspicious for metastatic as well.    4. Patient had a PowerPort placement on the left upper chest by Dr. Joseph on 8/9/2019.  5. Patient was started on concurrent infusional 5-FU chemoradiation therapy on 8/12/2019, with planned complete surgical resection and further adjuvant chemotherapy with intention to cure the disease.   6. Patient underwent surgical resection of the primary rectal cancer by Dr. Ye on 12/2/2019.  Pathology evaluation reported residual T3N1 disease stage IIIb.  7. Diarrhea related to therapy and radiation.   8. Patient was started cycle 1 day 1 of adjuvant FOLFOX 6 on 1/23/2020.  9. On 2/5/2020 FOLFOX 6 cycle 2 delayed secondary to neutropenia.  Patient was given 3 days of Granix injection.  After cycle #2 we planned 3 days of Granix with each cycle.   10. Patient also intolerant of oral iron.  Patient received 2 doses of IV injectafer on 02/05/2020 and 02/12/2020.   11. 02/12/2020 Proceed with cycle #2 of FOLFOX 6 with 3 days of Granix.  12. On 3/11/2020 cycle 4 postponed secondary to abdominal pain and occasional low-grade fevers.  CT scans ordered.  13. Cycle #4 resumed after CT scan revealed no evidence of disease.  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 Ye. patient has no fever.  Continue to observe.   14. Grade 3  hand-foot syndrome secondary to 5-FU after cycle #7 FOLFOX 6 chemotherapy, prompting ER visit.  Also has worsening peripheral neuropathy.   15. Cycle #8 FOLFOX 6 was given on 5/13/2020, with 50% dose reduction for both 5-FU and oxaliplatin, and also examination of bolus 5-FU.   16. PET scan examination on 5/21/2020 reported no evidence of metastatic disease in the chest abdomen pelvis.  Stable 6 mm RUL pulmonary nodule.  17. On 5/27/2020, I discussed with the patient that we can discontinue chemotherapy at this time.   18. Patient had a surgical procedure for low anterior colon resection, coloanal anastomosis on 8/3/2020.  19. CT scan for chest abdomen pelvis on 9/9/2020 reported a new 8 mm noncalcified pulmonary nodule in the left lower lobe of the lung.  Otherwise stable right upper lobe 6 mm pulmonary nodule, and no evidence of disease recurrence in the abdomen or pelvis.  20. PET scan examination on 9/18/2020 reported multiple hypermetabolic small pulmonary nodules/ new pulmonary nodules.   21. Patient was started on pelvic chemotherapy with FOLFIRI regimen on 10/13/2020.   22. 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 It reported tumor mutation burden 5 Muts/Mb, microsatellite stable, TP53 mutation R282W, and others.   23. Cycle #5 was without irinotecan, due to peripheral neuropathy.  24. 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  Patient was switched to weight-based Lovenox injection.  25. Cycle #6 5-FU and irinotecan was delayed by 2 weeks because of the above incident.  26. Patient had a telemedicine evaluation at that the [facility] [place] in February 2021.  They agreed with our treatment plan for palliative chemotherapy followed by maintenance chemotherapy.       HISTORY OF PRESENT ILLNESS:  The patient is a 40 y.o. female with the above-mentioned  history who is here today for lab review and evaluation due for maintenance Avastin.  She also continues on Xeloda.  She was seen on Monday 5/3/2021.  And found to have significant hand-foot syndrome due to Xeloda.  It was decided that we would proceed with a reduced dose of Xeloda.  The patient returns today asking if she can instead just continue with infusional 5-FU and leucovorin.  She states that she tolerated that fairly well, without any skin reaction.  The pump did not bother her.  She has been diligently applying moisturizer to her hands and feet multiple times a day.  She does still have significant dryness, some mild fissuring, and discoloration.    Patient is also questioning her Lovenox dosing, as she has lost a significant amount of weight since she has been on it.  She denies any significant issues of bleeding.  She does have some mild bruising on her abdomen and knots from her Lovenox injections.    Her ostomy is functioning well.      Past Medical History:   Diagnosis Date   • Abdominopelvic abscess (CMS/HCC) 08/12/2020    ADMITTED TO [facility]   • Abnormal Pap smear of cervix 02/02/1998    JULIUS I   • Anemia in pregnancy    • Anxiety    • Bilateral epiphora 04/2020   • Bilateral hand swelling 05/02/2020    SEEN AT [facility] ER   • Cervical lymphadenitis 06/06/2012    SEEN AT [facility] ER   • Chemotherapy induced neutropenia (CMS/HCC) 04/2020   • Chemotherapy-induced nausea 02/2020   • Chemotherapy-induced thrombocytopenia 05/2020   • Chronic diarrhea    • Colon polyps     FOLLOWED BY DR. GERONIMO ESPARZA   • Cystitis 04/24/2020    WITH DEHYDRATION, ADMITTED TO [facility]   • Cystitis 10/27/2020   • Drug-induced peripheral neuropathy (CMS/HCC) 05/2020   • Fistula of intestine    • GERD (gastroesophageal reflux disease)    • Hand foot syndrome 05/2020   • Heart murmur     IN CHILDHOOD   • Hematochezia    • Hemorrhoids    • Heterozygous factor V Leiden mutation (CMS/[facility])     DX 8-2-2019   • History of anemia    • History of  chemotherapy     FOLLOWED BY DR. ALEXANDRU HUNT   • History of gestational diabetes    • History of pre-eclampsia    • History of radiation therapy     FOLLOWED BY DR. JAVON LEWIS   • History of TB skin testing 2009    TB Skin Test   • HPV in female    • Hypokalemia 2019   • Hypomagnesemia 2019   • IBS (irritable bowel syndrome)    • Ileostomy in place (CMS/HCC)     FOLLOWED BY DR. GERONIMO YE   • Infected insect bite of neck 2016    SEEN AT [facility]   • Kidney stone    • Kidney stones 2007    SEEN AT [facility] ER   • Lump of right breast     SWOLLEN LYMPH NODE   • Lung cancer (CMS/HCC) 2020    METASTATIC LUNG CANCER   • Monopolar depression (CMS/HCC)    • On anticoagulant therapy    • Perirectal abscess 2020   • PIH (pregnancy induced hypertension)    • Pulmonary embolism without acute cor pulmonale (CMS/HCC) 09/20/2019    X 3; ADMITTED TO [facility]   • Pulmonary nodule, right 2020   • Rectal cancer (CMS/HCC) 2019    STAGE IIA, INVASIVE MODERATELY DIFFERENTIATED ADENOCARCINOMA, CHEMO AND XRT FINISHED 2019   • Right shoulder pain 2020    SEEN AT [facility] ER   • Right ureteral stone 10/01/2019    SEEN AT [facility] ER   • SAB (spontaneous ) 1996     A2-1 INDUCED   • Sciatica    • Sepsis due to cellulitis (CMS/HCC) 2002    LEFT GREAT TOE, ADMITTED TO [facility]   • Tachycardia 2020   • Urinary urgency 2020     Past Surgical History:   Procedure Laterality Date   • CHOLECYSTECTOMY N/A 10/10/2003    LAPAROSCOPIC WITH CHOLANGIOGRAM, DR. JAMEY TALAVERA AT [facility]   • COLON RESECTION N/A 2019    Procedure: laparoscopic low anterior colon resection with mobilization of splenic flexure and diverting loop ileostomy: ERAS;  Surgeon: Geronimo Ye MD;  Location: [facility] OR;  Service: General   • COLON RESECTION N/A 8/3/2020    Procedure: LOW ANTERIOR COLON RESECTION, COLOANAL ANASTOMOSIS, MOBILIZATION SPLENIC FLEXURE;  Surgeon: Geronimo Ye,  MD;  Location: [facility] MAIN OR;  Service: General;  Laterality: N/A;   • COLONOSCOPY N/A 7/15/2020    PATENT ANASTAMOSIS IN MID RECTUM, RESCOPE IN 1 YR, DR. GERONIMO YE AT [facility]   • COLONOSCOPY W/ POLYPECTOMY N/A 07/08/2019    15 MM TUBULOVILLOUS ADENOMA POLYP IN HEPATIC FLEXURE, 20 MMTUBULOVILLOUS ADENOMA WITH HIGH GRADE DYSPLASIA IN RECTOSIGMOID, 4 CM MASS IN MID RECTUM, PATH: INVASIVE MODERATELY DIFFERENTIATED ADENOCARCINOMA, DR. JENNIFER LI AT [facility]   • DILATATION AND EVACUATION N/A 2009   • ENDOSCOPY N/A 07/08/2019    LA GRADE A ESOPHAGITIS, GASTRITIS, ALL BIOPSIES BENIGN, DR. JENNIFER LI AT [facility]   • INCISION AND DRAINAGE PERIRECTAL ABSCESS N/A 8/14/2020    Procedure: INCISION AND DRAINAGE OF retrorectal dehiscence abcess with drain placement and irrigation;  Surgeon: Geronimo Ye MD;  Location: [facility] MAIN OR;  Service: General;  Laterality: N/A;   • PAP SMEAR N/A 01/24/2014   • SIGMOIDOSCOPY N/A 7/24/2019    INFILTRATIVE PARTIALLY OBSTRUCTING LARGE RECTAL CANCER, AREA TATOOED, DR. GERONIMO YE AT [facility]   • SIGMOIDOSCOPY N/A 11/23/2019    AN ULCERATED PARTIALLY OBSTRUCTING MASS IN MID RECTUM, AREA TATTOOED, DR. GERONIMO YE AT [facility]   • TRANSRECTAL ULTRASOUND N/A 7/24/2019    Procedure: ULTRASOUND TRANSRECTAL;  Surgeon: Geronimo Ye MD;  Location: [facility] ENDOSCOPY;  Service: General   • URETEROSCOPY LASER LITHOTRIPSY WITH STENT INSERTION Right 10/30/2019    DR. ESTUARDO BEASLEY AT [place]   • VAGINAL DELIVERY N/A 09/18/1998   • VENOUS ACCESS DEVICE (PORT) INSERTION Left 8/9/2019    Procedure: INSERTION VENOUS ACCESS DEVICE;  Surgeon: Sj Joseph MD;  Location: [facility] OR OSC;  Service: General   • VENOUS ACCESS DEVICE (PORT) INSERTION N/A 12/18/2020    Procedure: INSERTION VENOUS ACCESS DEVICE right side, removal venous access device left side;  Surgeon: Sj Joseph MD;  Location: [facility] OR [facility]SC;  Service: General;  Laterality: N/A;   • WISDOM TOOTH EXTRACTION  Bilateral 1993       HEMATOLOGIC/ONCOLOGIC HISTORY:      The patient reports she has intermittent rectal bleeding and urgency, mucous with her stool, starting sometime in 2016. At that time she was referred to GI service, and was diagnosed of irritable bowel syndrome. Nevertheless she had worsening urgency for bowel movement, and had a couple of incidents losing control of stool. She was recently seen by Roland Thorpe MD again and had colonoscopy and EGD exam on 07/08/2019. She was found having a circumferential rectal mass. According to the procedure note, the patient had a fungating circumferential bleeding 4 cm mass in the middle rectum, at distance between 13 cm and 17 cm from the anus. Mass was causing partial obstruction. There were also colon polyps found at the hepatic flexure and also at the rectosigmoid junction 23 cm from the anus. Both were resected and retrieved. EGD examination reported grade A esophagitis at the GE junction and patchy discontinuous erythema and aggravation of the mucosa without bleeding in the stomach body. There is normal mucosa of the duodenum. Pathology evaluation from this procedure reported moderately differentiated adenocarcinoma involving the rectal mass. The rectal sigmoid junction polyp was tubular/tubulovillous adenoma with high grade dysplasia negative for invasive malignancy. The hepatic flexure polyp was also tubular/tubulovillous adenoma negative for high grade dysplasia or malignancy. The biopsy from the esophagus reports squamocolumnar mucosa with inflammatory changes suggestive of mild reflux esophagitis, negative for interstitial metaplasia. Gastric biopsy was benign and duodenal biopsy was also benign. There is no mention of H-pylori.     Patient was subsequently referred to colorectal surgeon Padmaja Ye MD for further evaluation. The patient had CT scan examination for chest, abdomen and pelvis requested by Dr. Ye and were done on 07/13/2019. The study  reported no evidence of lymphadenopathy in the abdomen and pelvis. There was wall thickening of the rectosigmoid junction. Normal spleen, pancreas, adrenal glands and kidneys. There was fatty liver infiltration but no focal lymphatic lesions. There was a small 6-7 mm noncalcified nodule in the right upper lobe and a tiny 3 mm subpleural nodule in the right middle lobe. No mediastinal or hilar lymphadenopathy.     Dr. Ye performed staging rectal ultrasound examination. This study reported tumor penetrating through the muscularis propria as T3 disease; there were no lymph nodes identified.    She had a hospitalization in late September 2019 because of newly discovered pulmonary emboli.  She was on prophylactic Lovenox prior to the incident of PE.  Now she is on full dose Xarelto anticoagulation.  Patient reports no further chest pain dyspnea.  She denies bleeding or bruising.  During her hospitalization, she was seen by Dr. Ye, who plans to have surgery 8 to 12 weeks after finishing radiation therapy.  She finished her radiation on 9/19/2019.     I noticed patient also presented to the emergency room on 10/1/2019 complaining of right flank area pain.  I reviewed the images studies and indeed she has a very small 1 to 2 mm obstructing kidney stone in the UV junction.  Patient is still symptomatic with some pains and dysuria.  She denies fever sweating or chills.    Laboratory study on 10/7/2019 reported normal CBC including hemoglobin 13.1, platelets 301,000, WBC 6170 and ANC 4900 lymphocytes 590 monocytes 480.      The nurse reported malfunction of port-a-catheter on 10/7/2019.  Port study on 10/8/2019 showed fibrin sheath around the distal aspect of the Mediport catheter in the SVC. This does not appear to hinder injection, but does prevent aspiration at this time.    Patient underwent surgical resection of the colon on 12/2/2019 with Dr. Ye.  Pathology evaluation reported residual T3 disease, also 1 out of  14 lymph nodes positive for malignancy.  So this patient in either had at least stage IIIb disease (T3 N1 M0?).      Adjuvant chemotherapy FOLFOX 6 will be started on 1/22/2020.  Laboratory study reported iron saturation 10%, free iron 31 TIBC 319 and ferritin 168.  Her hemoglobin was 11.8, WBC 5600, and platelets 347,000.    Patient was here on 02/12/2020 for cycle 2 of her FOLFOX.  This is delayed x1 week secondary to neutropenia.  The patient ultimately received 3 days worth of Neupogen with recovery of her blood counts.  Of note, the patient struggled with significant nausea without any episodes of vomiting following cycle #1 of chemotherapy resulting in significant weight loss.  She is up 12 pounds over the last week as her appetite has normalized.  We will add Emend to her care plan.    She is having several loose stools per her colostomy and has been hesitant to take Imodium due to prior history of constipation.  I encouraged her to try this with a maximum of 8 tablets/day.  She denies any infectious symptoms including fevers or chills.  No excess bleeding or bruising noted.  She had the expected cold sensitivity related to the Oxaliplatin for about 3 days following treatment.    Labs from 02/12/2020 demonstrated total white blood cell count of 5.14, ANC of 3.06, hemoglobin of 11.2, platelets of 211,000.  She was found to be iron deficient last week and is intolerant to oral iron secondary to GI distress.  For this reason, she initiated IV iron therapy with Injectafer last week.  She had received her second dose 02/12/2020    Patient has normalized hemoglobin 12.2 on 2/26/2020.    She reported on 5/5/2020 she had a recent visit to the emergency department for what was suspected to be an allergic reaction.  She called our on-call service on Saturday with reports of hand and face swelling.  She was instructed to proceed to the emergency department at which time she was assessed and prescribed a Medrol Dosepak.   She had just completed her 5-FU and was unhooked on Friday, 5/3/2020.  Her symptoms have improved.  She does report persistent hyperpigmentation and mild swelling of the palms of the hands but this is much improved.  It was her right hand specifically that was swollen.  Her facial swelling has resolved.  She continues on Cefdinir nd since has 1 day remaining, she was prescribed cefdinir for a UTI requiring hospitalization at the end of April.  Reports no new symptoms.  Her labs are stable.  She is scheduled for treatment again.    Patient states at this time she is not tolerating her chemotherapy well.     Patient seen in the emergency department on 5/2/2020 for what was suspected to be an allergic reaction.  She called our on-call service on Saturday explaining that she was experiencing hand and face swelling.  She was instructed to proceed to the emergency department at which time she was assessed and prescribed a Medrol Dosepak.  She had just completed her 5-FU and was unhooked on Friday, 5/1/2020.      She reports since her ED visit on 5/2/2020 her symptoms have not improved. Her hands and feet were swollen upon presenting to the ED and she could barely make a fist. She states that she feels so swollen she is not able to stand it. She states that her skin on the hands and feet are peeling extensively as well, besides changing color to more dark.     She also reports significant fatigue after her first week of the 5-FU treatment but she expected this side effect. She also notices significant increase in her neuropathy to the point that she is not able to even walk around in her home without her house slippers due to irritation from her rugs. She denies and associated nausea or vomiting at this time. She also has episodes of epistaxis every day after the chemotherapy cycle #7.  She does report working full-time during the week of chemotherapy.    Laboratory studies, 5/13/2020, show moderate thrombocytopenia  platelets 123,000, low normal WBC 4140 including ANC 2720 and normal hemoglobin 13.4.  Because significant hand-foot syndrome, will decrease both 5-FU and oxaliplatin by 50%, and eliminate bolus dose of 5-FU.    On 5/21/2020 patient had a PET scan performed which showed no convincing evidence of residual disease in abdomen or pelvis, no metastatic disease within the chest or neck.     Cycle #8 FOLFOX 6 was given on 5/13/2020, with 50% dose reduction for both 5-FU and oxaliplatin, and also examination of bolus 5-FU.  She states on 5/27/2020 that with the recent reduction of the chemotherapy she feels significantly better. She has more energy and is able to do her daily routine.      PET scan examination on 5/21/2020 reported no evidence of metastatic disease in chest abdomen pelvis.  There was a stable 6 mm right upper lobe pulmonary nodule.    Laboratory studies on 5/27/2020 showed mild leukocytopenia WBC 3720 but a normal ANC 2250 and lymphocytes 630.  Normal platelets 163,000 and hemoglobin 12.6.  Chemistry lab reported normal renal function, liver function panel and a electrolytes, elevated glucose 164.    Laboratory studies 6/24/2020, showed normal hemoglobin 13.4 but macrocytosis .9.  Normal platelets 210,000 and WBC 5870.  She had normal CMP.     Patient last time was here in late June 2020.  Since that time she had surgical procedure for low anterior colon resection, coloanal anastomosis on 8/3/2020.  She later developed a perirectal abscess, required surgical drainage on 8/14/2020.  She was discharged on 8/27/2020.    Patient reports to me she still has lower abdominal wall vacuum suction in place.  She denies fever sweating chills.  Performance status is ECOG 1.  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  She does have visiting nurse come to the home for wound care.    CT scan for chest abdomen pelvis on 9/9/2020 reported a new 8 mm noncalcified pulmonary nodule in the  left lower lobe.  Otherwise stable right upper lobe 6 mm pulmonary nodule, and no evidence of disease recurrence in the abdomen or pelvis.     Laboratory study on 9/16/2020 reported elevated AST 55, ALT 95, alk phosphatase 143 but normal total bilirubin 0.3.  Chemistry lab otherwise unremarkable.  She has completed normal CBC.      Because of the abnormal CT scan examination for chest abdomen pelvis on 9/9/2020 reported a new 8 mm noncalcified pulmonary nodule in the left lower lobe, we obtained a PET scan examination for further evaluation, which was done on 9/18/2020.  This study reported several pulmonary nodules, some of them are hypermetabolic, newly developed compared to previous PET scan in May 2020.  Certainly does highly suspicious for metastatic disease.  There are also hypermetabolic activity in the abdomen and pelvic area which is hard to differentiate from surgical scar versus metastatic malignancy.    Laboratory study on 10/13/2020 reported normal CBC with Hb 13.9, platelets 302,000 and WBC 5520 including ANC 3830.  Chemistry lab reported normalized AST 20, alk phosphatase 116 improved ALT 35, and maintains normal bilirubin, with normal electrolytes and liver function panel.     Patient was started on first cycle of palliative chemotherapy FOLFIRI on 10/13/2020.    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  Patient had a new port placed 12/18/2020, and had held her Xarelto for 2 days prior to surgery.  She presented to the ER on 12/21/20 with complaints of facial and arm swelling for 3 days.  She also noted increased shortness of breath.  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  Thrombus within the right internal jugular vein cannot be excluded.  Patient was started on IV heparin, and eventually transitioned  to weight-based Lovenox, which she now continues.      MEDICATIONS    Current Outpatient Medications:   •  clonazePAM (KlonoPIN) 1 MG tablet, TAKE 1 TABLET BY MOUTH THREE TIMES A DAY AS NEEDED FOR ANXIETY OR SEIZURES, Disp: 90 tablet, Rfl: 0  •  dicyclomine (BENTYL) 20 MG tablet, TAKE 1 TABLET BY MOUTH EVERY 6 HOURS AS NEEDED, Disp: 360 tablet, Rfl: 0  •  diphenoxylate-atropine (LOMOTIL) 2.5-0.025 MG per tablet, TAKE 1 TABLET BY MOUTH 4 (FOUR) TIMES A DAY AS NEEDED FOR DIARRHEA., Disp: 120 tablet, Rfl: 1  •  enoxaparin (Lovenox) 100 MG/ML solution syringe, Inject 1 mL under the skin into the appropriate area as directed Every 12 (Twelve) Hours., Disp: 60 mL, Rfl: 2  •  escitalopram (LEXAPRO) 10 MG tablet, TAKE 1 TABLET BY MOUTH EVERY DAY, Disp: 30 tablet, Rfl: 5  •  famotidine (PEPCID) 20 MG tablet, TAKE 1 TABLET BY MOUTH TWICE A DAY, Disp: 180 tablet, Rfl: 1  •  Loratadine (CLARITIN) 10 MG capsule, Take 10 mg by mouth Every Evening., Disp: , Rfl:   •  mineral oil-hydrophilic petrolatum (AQUAPHOR) ointment, Apply 1 application topically to the appropriate area as directed As Needed for Dry Skin., Disp: , Rfl:   •  ondansetron (ZOFRAN) 8 MG tablet, Take 1 tablet by mouth 3 (Three) Times a Day As Needed for Nausea or Vomiting., Disp: 60 tablet, Rfl: 5  •  prochlorperazine (COMPAZINE) 10 MG tablet, Take 1 tablet by mouth Every 6 (Six) Hours As Needed for Nausea or Vomiting., Disp: 30 tablet, Rfl: 2  •  promethazine (PHENERGAN) 25 MG tablet, Take 1 tablet by mouth Every 6 (Six) Hours As Needed for Nausea or Vomiting., Disp: 60 tablet, Rfl: 2  No current facility-administered medications for this visit.    Facility-Administered Medications Ordered in Other Visits:   •  bevacizumab (AVASTIN) 900 mg in sodium chloride 0.9 % 136 mL chemo IVPB, 900 mg, Intravenous, Once, Sheba Matias APRN  •  fluorouracil (ADRUCIL) 4,850 mg, sodium chloride 0.9 % 143 mL 240 mL chemo infusion - FOR HOME USE, 2,400 mg/m2 (Treatment  "Plan Recorded), Intravenous, Once, Sheba Matias APRN  •  leucovorin 810 mg in sodium chloride 0.9 % 290.5 mL IVPB, 400 mg/m2 (Treatment Plan Recorded), Intravenous, Once, Sheba Matias APRN    ALLERGIES:   No Known Allergies    SOCIAL HISTORY:       Social History     Tobacco Use   • Smoking status: Current Every Day Smoker     Packs/day: 0.50     Years: 24.00     Pack years: 12.00     Types: Electronic Cigarette, Cigarettes   • Smokeless tobacco: Never Used   Vaping Use   • Vaping Use: Some days   • Substances: Nicotine   • Devices: Disposable   Substance Use Topics   • Alcohol use: Not Currently     Comment: Caffeine use rare   • Drug use: No         FAMILY HISTORY:   Mother has positive factor V Leiden mutation, although she did not have thrombosis, mother also is coronary disease with stenting, she also is occasional bruising.  Maternal grandmother had DVT, she had multiple surgical procedures.  Patient mother's half-brother had metastatic colon cancer at diagnosis in his 50s.  Maternal great aunt has breast cancer.  Patient will follow his skin cancer in his 60s, exclusive.         Vitals:    05/11/21 1320   BP: 135/89   Pulse: 108   Resp: 16   Temp: 97.3 °F (36.3 °C)   TempSrc: Temporal   SpO2: 95%   Weight: 87.5 kg (192 lb 14.4 oz)   Height: 166 cm (65.35\")   PainSc: 0-No pain     Current Status 5/11/2021   ECOG score 1     Physical Exam  Vitals reviewed.   Constitutional:       General: She is not in acute distress.     Appearance: Normal appearance. She is well-developed.   HENT:      Head: Normocephalic and atraumatic.   Eyes:      Pupils: Pupils are equal, round, and reactive to light.   Cardiovascular:      Rate and Rhythm: Normal rate and regular rhythm.      Heart sounds: Normal heart sounds. No murmur heard.     Pulmonary:      Effort: Pulmonary effort is normal. No respiratory distress.      Breath sounds: Normal breath sounds. No wheezing.   Abdominal:      General: Bowel sounds are " normal. There is no distension.      Palpations: Abdomen is soft. There is no mass.      Comments: Ostomy in place with liquid brown stool.  Scattered bruises on the abdomen bilaterally from Lovenox injections.   Musculoskeletal:      Cervical back: Neck supple.      Right lower leg: No edema.      Left lower leg: No edema.   Lymphadenopathy:      Cervical: No cervical adenopathy.   Skin:     General: Skin is warm and dry.      Findings: No lesion or rash.      Comments: hyperpigmentation of the palms and feet, with dryness   Neurological:      Mental Status: She is alert and oriented to person, place, and time.     05/11/2021 physical exam is unchanged from above except as updated.    RECENT LABS:  Results from last 7 days   Lab Units 05/11/21  1301   WBC 10*3/mm3 6.39   NEUTROS ABS 10*3/mm3 4.11   HEMOGLOBIN g/dL 16.1*   HEMATOCRIT % 47.1*   PLATELETS 10*3/mm3 200     Results from last 7 days   Lab Units 05/11/21  1301   SODIUM mmol/L 135   POTASSIUM mmol/L 3.8   CHLORIDE mmol/L 100   CO2 mmol/L 23.1   BUN mg/dL 12   CREATININE mg/dL 0.83   CALCIUM mg/dL 9.5   ALBUMIN g/dL 3.80   BILIRUBIN mg/dL 0.3   ALK PHOS U/L 106   ALT (SGPT) U/L 57*   AST (SGOT) U/L 34*   GLUCOSE mg/dL 136*           Assessment/Plan      ASSESSMENT:   1.  Rectal cancer, rectal ultrasound examination reported T3N0 disease without lymphadenopathy.   · CT scan of chest, abdomen and pelvis reported no lymphadenopathy in the abdomen, pelvis or chest. She does have small pulmonary nodule 6-7 mm and 2 mm with indeterminate feature. There is no solid evidence of metastatic disease otherwise.   · Based on the CT scan and rectal ultrasound imaging studies, this patient had stage IIA (T3N0M0) disease.    · She had PET scan examination on 8/8/2019 which reported a hypermetabolic right upper lobe pulmonary nodule 6 mm with SUV 5.6.  This is suspicious for metastatic disease however it is too small to be biopsied.  This patient may have stage IV disease.    · She initiated concurrent radiation with continuous 5-FU on 8/12/2019.  · Patient finished concurrent chemoradiation therapy.  · Patient underwent surgical resection of the rectal tumor and diverting loop ileostomy on 12/2/2019 with Dr. Ye.  Pathology evaluation reported residual T3 disease, with 1 out of 14 lymph nodes positive for malignancy.  Certainly this patient has at least stage IIIb rectal cancer (T3 N1 M0?)  · On 1/22/2020 adjuvant chemotherapy FOLFOX 6 regimen initiated.    · On 2/5/2022 cycle #2 was delayed secondary to neutropenia.  She was given 3 days of Granix.   · Emend added with cycle 3.  With improved nausea control  · Continuing home Granix x3 days following 5-FU disconnect  · 3/11/2020 due for cycle 4, however, she is experiencing progressive abdominal pain and occasional fevers.   · CT scan performed on 3/13/2020 reveals no evidence of progressive or recurrent disease.  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  · Patient hospitalized 4/24-4/26/20 after cycle 5 of chemotherapy with acute UTI.  CT abdomen/pelvis noting fluid collection in the presacral region having diminished in size compared to CT dated 3/13/2020.  Patient was evaluated by Dr. Ye while in the hospital with further plans to evaluate possible colovaginal fistula following completion of chemotherapy.  Patient did respond to IV antibiotics and discharged home on oral cefdinir.  · 4/29/2020 cycle 6 of FOLFOX.  Urinary symptoms have resolved   · Patient seen in the [facility] ED on 5/2/2020 for what was suspected to be an allergic reaction.  She called our service on Saturday explaining that she was experiencing hand and face swelling.  She was instructed to proceed to the emergency department at which time she was assessed and prescribed a Medrol Dosepak.  She had just completed her 5-FU and was unhooked on Friday, 5/1/2020.  Her symptoms have resolved in the office on assessment, 5/5/2020.  · The patient had grade 3  hand-foot syndrome based on symptomology.  Patient had cycle #8 of 5-FU on 5/13/2020. Due to her symptoms and poor tolerance to the 5-FU, her treatment dose will be reduced 50% for oxaliplatin and infusional 5-FU.  Bolus 5-FU will be discontinued..  · On 5/13/2020  We discussed further treatment options pending the scan results.  If she has indeed residual disease or metastatic disease, we will switch her to irinotecan plus Avastin or anti-EGFR monoclonal antibody.  She will need a further molecular testing of her tumor samples in that case.  · On 5/21/2020 patient had a PET scan and it showed no evidence of of metastatic disease in the neck, chest, abdomen or pelvis.  There was fluid accumulation/abscess.   · On 5/27/2020 I discussed with the patient that we can discontinue chemotherapy at this time.  She will follow-up with Dr. Ye to discuss a possibility to reverse the ileostomy.  We likely will obtain anther PET scan in 3 to 4 months for reassessment.    · Patient was seen by Dr. Ye on 6/19/2019 for evaluation and to discuss possible take down of her ileostomy.  She is scheduled to have a gastrografin enema on 7/2/2020 to evaluate for a fistula, and then a colonoscopy on 7/15/2020, both done by Dr. Ye.  She states that based on the above imaging and procedure findings, she may have a more extensive surgery done or just have her ileostomy reversed, which would likely be done in August 2020.  This will all be coordinated under the care of Dr. Ye.     · I reviewed scan results with the patient on 9/16/2020 for the most recent CT scan from 09/09/2020 and also compared to her previous PET scan examination from 05/21/2020 and also the original PET scan from 08/09/2019.  The original PET scan there is a small right upper lobe pulmonary nodule 6 mm with SUV 5.6. So in the 05/2020 PET scan the nodule is still there but activity seems much less and this most recent CT scan examination also documented the  preexisting small pulmonary nodule however there is a new left lower lobe pulmonary nodule 8 mm in size and I shared with the patient today this nodule is suspicious for metastatic disease. Laboratory studies reported minimal CEA level 1.32 on 09/09/2020. Liver function panel was only marginally abnormal with the ALT 45, the remaining is normal. However laboratory study today showed worsening AST 55, ALT 95 and alkaline phosphatase 143 but is still normal, total bilirubin 0.3.   · So I discussed with the patient on 9/16/2020 recommended to have repeat PET scan examination for assessment because the left lower lobe pulmonary nodule is too small to be biopsied, and if the PET scan reports hypermetabolic lesion, I recommended to have stereotactic radiation therapy. Explained to patient that she is not a really good candidate to have thoracotomy for resection because she still has unhealed wound in the abdomen. I think the stereotactic radiation therapy will be a more feasible choice.  · Laboratory study on 9/16/2020 reported worsening liver function panel with both elevated AST, ALT and also slightly worsened alkaline phosphatase despite having normal total bilirubin. I recommended to repeat laboratory study for reevaluation. The only new medication she has in the past several days is Augmentin but otherwise no change of condition. She denies any recent viral infection. We will monitor this.   · PET scan examination obtained on 9/18/2020 showed metastatic disease.  It reported a few hypermetabolic pulmonary nodules, and some new small pulmonary nodules, all of them highly suspicious for metastatic disease.  She also has hypermetabolic activity in the abdomen and pelvic area which could be related to scar tissue from her recent surgery versus metastatic disease.  Nevertheless overall picture fits with metastatic cancer.  · Discussed with the patient on 9/20/2020, we reviewed the PET scan images together, and I  recommended to have systematic chemotherapy, I do not think stereotactic reading therapy to the hypermetabolic lesions in the lungs warranted at this time because even those will be treated with reading therapy, she will still need systematic chemotherapy.  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  I did discuss with the patient using anti-EGFR monoclonal antibody versus anti-VEGF monoclonal antibody.     · Palliative chemotherapy cycle#1 FOLFIRI was started on 10/13/2020.  No bolus 5-FU given considering previous poor tolerance.    · NGS study from [facility] reported positive for K-s[place] mutation.  Microsatellite stable, mutation burden 5/Mb.    · Discussed with the patient 10/27/2020, because of the K-s[place]ia mutation, she is not a candidate for anti-EGFR monoclonal antibody such as Erbitux or vectibix.  She could be a candidate for anti-VEGF monoclonal antibody, however because of active wound, she is not a candidate right now at this moment.  · Patient seen in the ED for acute right neck pain on 11/16/2020.  CT angiogram as well as CT of the cervical spine performed with no acute findings but notably one of her pulmonary nodules, left upper lobe, somewhat decreased in size.  Patient given prednisone pack.  Further details below.  · Patient due today for cycle #4 FOLFIRI.  Plan repeat PET scan after cycle 6.  · Last received cycle #5 chemotherapy without irinotecan on 12/8/2020.   · Patient here 12/29/2020 for evaluation and consideration of cycle 6, but she continues to complain of swelling.  She does have swelling typically after treatment as well.  We will hold treatment for 1 week and reevaluate next week.  Still planning on PET scan following cycle 6.   · Cycle #6 chemotherapy will be resumed on 1/5/2021.  Started back on original irinotecan.  · PET scan examination obtained on 1/12/2021 after cycle #6 chemotherapy reported improved pulmonary nodule hypermetabolic  activity.  Confirmed these are metastatic lesions.  · Patient is evaluated 1/19/2020, will continue cycle #7 FOLFIRI chemotherapy.  However Avastin will be on hold see next section.   · Patient was reevaluated on 2/2/2021, will continue chemotherapy cycle #8 FOLFIRI.  Avastin / biosimilar will be added.    · She talked to [facility] specialist in mid February 2021, and had recommended palliative chemotherapy without surgical intervention of metastatic lung lesions.   · 2/16/2021cycle #9 FOLFIRI plus bevacizumab.  Tolerated last cycle well with only some mild increase in liquid stools.  This was easily controlled with antidiarrheals.  · On 3/2/2021, patient will proceed with cycle #10 FOLFIRI plus bevacizumab.  After 12 cycles, plan to start maintenance treatment.  · 3/16/2021 cycle 11.  Plus bevacizumab.  · 3/30/2021 cycle 12 FOLFIRI plus bevacizumab.  Having issues with worsening nausea despite premeds with dexamethasone, Aloxi, Emend, and Zofran at home.  Patient is requesting a dose of Phenergan, which is helped her in the past.  We will give this to her with treatment today.  · PET scan examination on 4/6/2021 reported no evidence of hypermetabolic metastatic lesion.  · Discussed with the patient on 4/13/2021, we reviewed the PET scan results.  We recommended to switch to maintenance chemotherapy without irinotecan.  We will continue 5-FU and Avastin every 2 weeks.  We discussed possibility of switching to oral Xeloda treatment.  Discussed with the patient for side effects more so with hand-foot syndrome.  Patient is agreeable.   · Xeloda 2000 mg twice daily 7 days on, 7 days off along with Avastin initiated 4/27/2021.  · After only 5 doses of Xeloda significant hand-foot syndrome, Xeloda held.  Dose adjustment versus transition to 5-FU will be further discussed with Dr. Nguyen  · Patient returns 5/11/2021 for reevaluation.  She did take her first dose of Xeloda 1500 mg this morning,  but is actually requesting to be transition back to infusional 5-FU, as she felt that she tolerated this without any problem.  She states that being hooked up to the 5-FU ball does not bother her.  I have further discussed with Dr. Nguyen, and he is okay with this.  The patient will receive 5-FU leucovorin and Avastin every 2 weeks.  Xeloda will be discontinued.    2 .  Pulmonary nodule, hypermetabolic on PET scan.   · Her original PET scan examination from 8/8/2019 reported a small 8 mm right upper apex pulmonary nodule but hypermetabolic SUV 5.1.  That was too small to be biopsied, however there was always a possibility of metastatic disease considering that high activity despite a such a small nodule.  · Her chest CT scan examination from 3/13/2020 reported this shrank to 6 mm.    · PET scan examination on 5/21/2020 reported no metabolic activity at this residual nodule.  This needs to be monitored.    · PET scan examination 9/18/2020 reported couple of hypermetabolic pulmonary nodules, besides a few extra small pulmonary nodules.  Those are highly suspicious for metastatic disease.    · PET scan examination obtained on 1/12/2021 after cycle #6 chemotherapy reported improved a pulmonary nodule hypermetabolic activity.  Confirmed these are metastatic lesions.  · 1/19/2021, patient reports she will see thoracic surgeon tomorrow at the [facility] where she seeks second opinion evaluation, and to see whether she would be a candidate for resection of pulmonary nodules.  I discussed with the patient, she had at least 2 hypermetabolic lesions although they responded to chemotherapy, I do not think she would be a candidate for surgery.   · Thoracic surgeon from [facility] recommended metastectomy and to discontinue chemotherapy afterwards.   · Patient had a third opinion evaluation on 2/11/2021 by [place]medicine [place] [facility] [place] who recommended palliative chemotherapy with no  surgical intervention for metastatic pulmonary lesions.    3.   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  Low-dose Lovenox discontinued and initiated Xarelto 20 mg daily.  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  · Patient held Xarelto for 2 days prior to MediPort removal from the left chest wall and placement of new port in the right chest wall on 12/18/2020.  She resumed Xarelto that evening.  · Progressive swelling in the neck, face, upper extremities prompting hospitalization with CT scan showing thrombosis in the right subclavian vein and SVC.  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  Although she had been off of Xarelto for a few days, clearly Xarelto was not prohibiting progression of her thrombosis after she resumed treatment and there was some evidence to suggest thrombosis had been present at least in the innominate vein on prior scan in September but now appears chronic.  Current acute thrombosis involving SVC and right subclavian.  · Patient was admitted and placed on heparin drip  · Patient was evaluated by vascular surgery and intervention was not recommended for thrombolysis/thrombectomy.  · On 12/22/2020, the patient developed worsening shortness of breath, increasing upper extremity edema consistent with worsening SVC syndrome.  Repeat CT angiogram chest was performed early on 12/23/2020 with findings of stable  SVC and brachiocephalic vein thrombosis, no evidence of pulmonary embolism.  · Symptoms improved and patient was discharged 12/24/2020 on Lovenox 100 mg every 12 hours.    · Returns 12/29/2020 for evaluation continuing Lovenox, overall tolerating it well.  No bleeding issues.  · Improved edema 1/5/2021.  Will continue Lovenox weight-based every 12 hours.  · On 1/19/2021 and 2/2/2021, patient reports improved facial swelling.  She however does have being bruise on her abdomen wall where she does Lovenox injection.  However she does not have a spontaneous epistaxis, gum bleeding or other bleeding.   · On 2/2/2021, we discussed that side effects of Avastin/biosimilar related to thrombosis.  Since this patient already has been on Lovenox, I think the benefits from treatment for her cancer will outweigh that risk of thrombosis, will proceed ahead with Avastin.  I cautioned patient to watch out for signs of worsening thrombosis patient voiced understanding.   · On 3/16/2021, no evidence of worsening DVT, continue Lovenox.  · 5/11/2021 Lovenox dosing will be adjusted due to patient's weight loss.  We discussed she needs 1 mg/kg.  Her syringes are 100 mg syringes she will do 0.9 mL subcu every 12 hours.    4.  This patient also has strong family history for malignancy the patient had appointment with genetic counseling on September 3, 2019.  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    5.  Mild anemia, improved since her surgery.    · She also has a history of iron deficiency.  Iron studies reveal a saturation of just 10%.  She was started on oral iron but was unable to tolerate due to GI side effects.   · Status post Injectafer 2/5/2020 and 2/12/2020   · Improved hemoglobin 13.5 on 1/5/2021.  · Hemoglobin 14.7 on 2/16/2021.    · Hemoglobin 16.2 on 3/2/2021.    · 3/16/2020 when hemoglobin now up to 16.2, hematocrit 47.6.  Patient admits that she has started smoking again, and I have encouraged her to quit.  She denies any  snoring or sleep apnea diagnosis.  Patient is not taking oral iron.  We will closely monitor.  · 3/30/2020 hemoglobin 15.7, HCT 47.5.  Patient states that she has cut back significantly on her smoking, although not quit yet.  I have encouraged her to continue working on this.  We will continue to closely monitor.  · Hemoglobin 16.1 today    6.  Peripheral neuropathy secondary to chemotherapy.    · This is mild involving bilateral hands and feet as reported on 9/16/2020.   · Will avoid chemotherapy with oxaliplatin.  · Stable mild neuropathy as of 11/10/2020  · Patient reports worsening peripheral neuropathy and cycle #5 chemotherapy on 12/8/2020 with and without irinotecan.   · On 1/5/2021, patient reports improvement of peripheral neuropathy, will add irinotecan back to chemotherapy.  Continue to monitor and adjust medication.  · On 3/2/2021, patient reports no worsening of peripheral neuropathy after restarting irinotecan.   · Stable peripheral neuropathy is reported today.    7.  History of hand-foot syndrome grade 3.    · It become so significant after cycle #7 FOLFOX treatment.  Discussed with patient on 5/13/2020, will discontinue the bolus 5-FU dose, and also decrease 50% of the infusion dose through the pump.   · We also use Medrol Dosepak for possible recurrent symptomology.  She responded this well.   · Her hand-foot syndrome improved.  · Under current treatment with FOLFIRI, patient not receiving 5-FU bolus.  No recurrent issues.   · Patient will be switched to maintenance chemotherapy using Xeloda in late April 2021.  · Following 5 doses of Xeloda, significant hand-foot syndrome with pain in the feet, significant erythema.  Xeloda held.  Will be further discussed with Dr. Nguyen to determine if the patient will resume 5-FU versus a dose reduction to Xeloda.  · Aggressive emollient moisturizer use twice daily for the past week and patient reports improvement in the dryness and erythema.  Xeloda will now be  discontinued and patient will switch back to 5-FU.    8.  Hyperlacrimation and mild scleral irritation related to 5-FU.  She has been taking steroid eyedrops.  This has improved her hyperlacrimation.  · Not currently an issue.  Continue to monitor with 5-FU.      9.  Abnormal liver function panel.  · Improved liver function panel 9/18/2020.  This is probably related to her recent infection.  · She has normalized AST, alk phosphatase, and a much improved only marginally elevated ALT 35 on 10/13/2020.    · Normalized liver function panel on 10/27/2020.    · Normal liver panel on 11/10/2020.    · Normal liver function panel on 12/5/2021.    · Slightly worsening liver function with AST 33 and ALT 56 1/19/2021.    · Improved AST 21 and ALT 39 on 2/2/2021.    · ALT 59 and AST 29 on 3/2/2021.    · ALT 56, AST 24, alk phosphatase 121 and total bilirubin 0.3 on 4/13/2021.  Continue to monitor.      10.  Intermittent leukocytopenia secondary to chemotherapy.   · WBC currently normal at 5220 and the neutrophil 3520 on 1/5/2021.  Continue to monitor.    · On 2/2/2021, WBC 4110 including ANC 2540.  Continue to monitor for now.  Consider G-CSF if neutropenia becomes an ongoing issue.   · 2/16/2021 WBC 4.6, ANC 2.79.    · 3/16/2021 WBC 3.93, ANC 2.26, continue to monitor.  · On 4/13/2021 WBC 4250 including ANC 2640.  · 5/11/2021 WBC 6.39, ANC 4.11.  Continue to monitor.    11.  Depression.  Patient seen by JULIET Davenport on 11/9/2020.  She was started on mirtazapine.  Lexapro was discontinued.  This has definitely improved her mood with the patient feeling overall much better.  She is sleeping better.  Appetite is improved with her actually eating more than she wants to.    · Condition is stable.  She plans to continue follow-up with supportive oncology.    12.  Intermittent upper abdominal discomfort.  This improves with eating.  After further discussion with the patient she also notes 3 nights of increased reflux when  laying down.  We discussed her symptoms could be related to increase stomach acid or potential ulcer.  She is currently taking Pepcid 20 mg daily.  I instructed her to add Prilosec 20 mg daily.   · On 3/16/2021 patient reports some worsening reflux symptoms last week.  She has increased her Pepcid to 20 mg twice daily, which did resolve her symptoms.  We discussed she can add Prilosec 20 mg daily as well.   · No specific complaints today.  We will continue to monitor.    13.  Proteinuria: 3/30/2020: Patient is 2+ protein in her urine.  We will continue with Avastin and closely monitor.  No need for 24-hour urine at this time.  · Persistent 2+ proteinuria on 4/13/2021.  continue to monitor.    14.  Dysuria is reported on 4/13/2021.  We will start the patient on cefdinir 300 mg twice a day for 5 days.  Will send urine culture.  Resolved.      15.  Tachycardia: Heart rate 124 today, regular.  Patient denies chest pain or palpitations.  She does report some increase in shortness of breath, particularly with exertion, although this is been present since her diagnosis of PE.  Patient states she was on metoprolol at some point, although it was discontinued and she cannot recall why.  We will go ahead and refer her to cardiology for further evaluation.  · Patient was seen by Dr. Bustos cardiologist on 4/6/2021.  We will continue to follow-up in 3 months.      PLAN:  1. Discontinue Xeloda   2. Patient will proceed with infusional 5-FU, leucovorin and Avastin.  3. We will proceed only with dexamethasone as a premedication.  4. Patient will use antiemetics at home if needed for nausea.  5. We discussed if she has nausea on disconnect day we could proceed then with Aloxi and Emend and added to future care plan.  6. Continue aggressive emollient moisturizers.  7. Decreased dose of Lovenox as above due to weight loss.  8. Return in 2 weeks for follow-up visit with nurse practitioner for reevaluation prior to her next dose of  5-FU/leucovorin/Avastin.    Patient continues on high risk medication requiring close monitoring for toxicity.  Experiencing significant side effects of medication requiring change in plan of care.    Sheba Matias, APRN  05/11/21      CC:  Deepika Vela III NP-C   MD Perla Bowers MD

## 2021-05-12 ENCOUNTER — TELEPHONE (OUTPATIENT)
Dept: ONCOLOGY | Facility: CLINIC | Age: 41
End: 2021-05-12

## 2021-05-13 ENCOUNTER — INFUSION (OUTPATIENT)
Dept: ONCOLOGY | Facility: HOSPITAL | Age: 41
End: 2021-05-13

## 2021-05-13 DIAGNOSIS — Z45.2 ENCOUNTER FOR FITTING AND ADJUSTMENT OF VASCULAR CATHETER: Primary | ICD-10-CM

## 2021-05-13 PROCEDURE — 25010000002 HEPARIN LOCK FLUSH PER 10 UNITS: Performed by: INTERNAL MEDICINE

## 2021-05-13 RX ORDER — SODIUM CHLORIDE 0.9 % (FLUSH) 0.9 %
10 SYRINGE (ML) INJECTION AS NEEDED
Status: DISCONTINUED | OUTPATIENT
Start: 2021-05-13 | End: 2021-05-13 | Stop reason: HOSPADM

## 2021-05-13 RX ORDER — SODIUM CHLORIDE 0.9 % (FLUSH) 0.9 %
10 SYRINGE (ML) INJECTION AS NEEDED
Status: CANCELLED | OUTPATIENT
Start: 2021-05-13

## 2021-05-13 RX ORDER — HEPARIN SODIUM (PORCINE) LOCK FLUSH IV SOLN 100 UNIT/ML 100 UNIT/ML
500 SOLUTION INTRAVENOUS AS NEEDED
Status: CANCELLED | OUTPATIENT
Start: 2021-05-13

## 2021-05-13 RX ORDER — HEPARIN SODIUM (PORCINE) LOCK FLUSH IV SOLN 100 UNIT/ML 100 UNIT/ML
500 SOLUTION INTRAVENOUS AS NEEDED
Status: DISCONTINUED | OUTPATIENT
Start: 2021-05-13 | End: 2021-05-13 | Stop reason: HOSPADM

## 2021-05-13 RX ADMIN — Medication 500 UNITS: at 14:20

## 2021-05-13 RX ADMIN — SODIUM CHLORIDE, PRESERVATIVE FREE 10 ML: 5 INJECTION INTRAVENOUS at 14:19

## 2021-05-17 ENCOUNTER — PREP FOR SURGERY (OUTPATIENT)
Dept: OTHER | Facility: HOSPITAL | Age: 41
End: 2021-05-17

## 2021-05-17 DIAGNOSIS — Z85.048 HISTORY OF RECTAL CANCER: Primary | ICD-10-CM

## 2021-05-19 ENCOUNTER — TELEPHONE (OUTPATIENT)
Dept: ONCOLOGY | Facility: CLINIC | Age: 41
End: 2021-05-19

## 2021-05-19 PROBLEM — Z85.048 HISTORY OF RECTAL CANCER: Status: ACTIVE | Noted: 2021-05-19

## 2021-05-19 NOTE — TELEPHONE ENCOUNTER
JULIA NUNEZ FROM [facility] WOULD LIKE TO THANK YOU FOR CALLING & PROVIDING HER WITH AN UPDATED CLINICAL REPORT, WHICH CAN BE FAXED [phone] AND SHE CAN BE REACHED [phone] EXT. [phone].    PATIENTS INFUSIONS HAVE BEEN APPROVED THROUGH AUGUST OF 2021.    PLEASE FEEL FREE TO CALL HR WITH ANY QUESTIONS.

## 2021-05-20 ENCOUNTER — IMMUNIZATION (OUTPATIENT)
Dept: VACCINE CLINIC | Facility: HOSPITAL | Age: 41
End: 2021-05-20

## 2021-05-20 PROCEDURE — 91300 HC SARSCOV02 VAC 30MCG/0.3ML IM: CPT | Performed by: INTERNAL MEDICINE

## 2021-05-20 PROCEDURE — 0002A: CPT | Performed by: INTERNAL MEDICINE

## 2021-05-22 DIAGNOSIS — F41.9 ANXIETY: ICD-10-CM

## 2021-05-24 RX ORDER — CLONAZEPAM 1 MG/1
TABLET ORAL
Qty: 90 TABLET | Refills: 0 | Status: SHIPPED | OUTPATIENT
Start: 2021-05-24 | End: 2021-07-21

## 2021-05-25 ENCOUNTER — INFUSION (OUTPATIENT)
Dept: ONCOLOGY | Facility: HOSPITAL | Age: 41
End: 2021-05-25

## 2021-05-25 ENCOUNTER — OFFICE VISIT (OUTPATIENT)
Dept: ONCOLOGY | Facility: CLINIC | Age: 41
End: 2021-05-25

## 2021-05-25 VITALS
TEMPERATURE: 97.3 F | BODY MASS INDEX: 32.55 KG/M2 | HEART RATE: 118 BPM | SYSTOLIC BLOOD PRESSURE: 137 MMHG | DIASTOLIC BLOOD PRESSURE: 96 MMHG | WEIGHT: 195.4 LBS | OXYGEN SATURATION: 95 % | HEIGHT: 65 IN | RESPIRATION RATE: 18 BRPM

## 2021-05-25 VITALS — HEART RATE: 100 BPM | DIASTOLIC BLOOD PRESSURE: 88 MMHG | SYSTOLIC BLOOD PRESSURE: 122 MMHG

## 2021-05-25 DIAGNOSIS — L27.1 HAND FOOT SYNDROME: ICD-10-CM

## 2021-05-25 DIAGNOSIS — C20 ADENOCARCINOMA OF RECTUM (HCC): Primary | Chronic | ICD-10-CM

## 2021-05-25 DIAGNOSIS — C20 ADENOCARCINOMA OF RECTUM (HCC): ICD-10-CM

## 2021-05-25 DIAGNOSIS — C20 ADENOCARCINOMA OF RECTUM (HCC): Primary | ICD-10-CM

## 2021-05-25 DIAGNOSIS — Z79.01 CHRONIC ANTICOAGULATION: ICD-10-CM

## 2021-05-25 LAB
ALBUMIN SERPL-MCNC: 3.9 G/DL (ref 3.5–5.2)
ALBUMIN/GLOB SERPL: 1.2 G/DL (ref 1.1–2.4)
ALP SERPL-CCNC: 93 U/L (ref 38–116)
ALT SERPL W P-5'-P-CCNC: 57 U/L (ref 0–33)
ANION GAP SERPL CALCULATED.3IONS-SCNC: 9 MMOL/L (ref 5–15)
AST SERPL-CCNC: 33 U/L (ref 0–32)
BASOPHILS # BLD AUTO: 0.01 10*3/MM3 (ref 0–0.2)
BASOPHILS NFR BLD AUTO: 0.2 % (ref 0–1.5)
BILIRUB SERPL-MCNC: 0.3 MG/DL (ref 0.2–1.2)
BILIRUB UR QL STRIP: ABNORMAL
BUN SERPL-MCNC: 8 MG/DL (ref 6–20)
BUN/CREAT SERPL: 9.4 (ref 7.3–30)
CALCIUM SPEC-SCNC: 9.3 MG/DL (ref 8.5–10.2)
CHLORIDE SERPL-SCNC: 102 MMOL/L (ref 98–107)
CLARITY UR: CLEAR
CO2 SERPL-SCNC: 27 MMOL/L (ref 22–29)
COLOR UR: ABNORMAL
CREAT SERPL-MCNC: 0.85 MG/DL (ref 0.6–1.1)
DEPRECATED RDW RBC AUTO: 61.7 FL (ref 37–54)
EOSINOPHIL # BLD AUTO: 0.18 10*3/MM3 (ref 0–0.4)
EOSINOPHIL NFR BLD AUTO: 4.3 % (ref 0.3–6.2)
ERYTHROCYTE [DISTWIDTH] IN BLOOD BY AUTOMATED COUNT: 16.4 % (ref 12.3–15.4)
GFR SERPL CREATININE-BSD FRML MDRD: 74 ML/MIN/1.73
GLOBULIN UR ELPH-MCNC: 3.2 GM/DL (ref 1.8–3.5)
GLUCOSE SERPL-MCNC: 113 MG/DL (ref 74–124)
GLUCOSE UR STRIP-MCNC: NEGATIVE MG/DL
HCT VFR BLD AUTO: 44.7 % (ref 34–46.6)
HGB BLD-MCNC: 15.6 G/DL (ref 12–15.9)
HGB UR QL STRIP.AUTO: NEGATIVE
IMM GRANULOCYTES # BLD AUTO: 0.01 10*3/MM3 (ref 0–0.05)
IMM GRANULOCYTES NFR BLD AUTO: 0.2 % (ref 0–0.5)
KETONES UR QL STRIP: ABNORMAL
LEUKOCYTE ESTERASE UR QL STRIP.AUTO: NEGATIVE
LYMPHOCYTES # BLD AUTO: 1.2 10*3/MM3 (ref 0.7–3.1)
LYMPHOCYTES NFR BLD AUTO: 28.5 % (ref 19.6–45.3)
MCH RBC QN AUTO: 35.5 PG (ref 26.6–33)
MCHC RBC AUTO-ENTMCNC: 34.9 G/DL (ref 31.5–35.7)
MCV RBC AUTO: 101.6 FL (ref 79–97)
MONOCYTES # BLD AUTO: 0.46 10*3/MM3 (ref 0.1–0.9)
MONOCYTES NFR BLD AUTO: 10.9 % (ref 5–12)
NEUTROPHILS NFR BLD AUTO: 2.35 10*3/MM3 (ref 1.7–7)
NEUTROPHILS NFR BLD AUTO: 55.9 % (ref 42.7–76)
NITRITE UR QL STRIP: NEGATIVE
NRBC BLD AUTO-RTO: 0 /100 WBC (ref 0–0.2)
PH UR STRIP.AUTO: 6 [PH] (ref 4.5–8)
PLATELET # BLD AUTO: 189 10*3/MM3 (ref 140–450)
PMV BLD AUTO: 9.3 FL (ref 6–12)
POTASSIUM SERPL-SCNC: 4.1 MMOL/L (ref 3.5–4.7)
PROT SERPL-MCNC: 7.1 G/DL (ref 6.3–8)
PROT UR QL STRIP: ABNORMAL
RBC # BLD AUTO: 4.4 10*6/MM3 (ref 3.77–5.28)
SODIUM SERPL-SCNC: 138 MMOL/L (ref 134–145)
SP GR UR STRIP: 1.02 (ref 1–1.03)
UROBILINOGEN UR QL STRIP: ABNORMAL
WBC # BLD AUTO: 4.21 10*3/MM3 (ref 3.4–10.8)

## 2021-05-25 PROCEDURE — 96416 CHEMO PROLONG INFUSE W/PUMP: CPT

## 2021-05-25 PROCEDURE — 85025 COMPLETE CBC W/AUTO DIFF WBC: CPT

## 2021-05-25 PROCEDURE — 25010000002 BEVACIZUMAB 100 MG/4ML SOLUTION 4 ML VIAL: Performed by: NURSE PRACTITIONER

## 2021-05-25 PROCEDURE — 96413 CHEMO IV INFUSION 1 HR: CPT

## 2021-05-25 PROCEDURE — 81003 URINALYSIS AUTO W/O SCOPE: CPT

## 2021-05-25 PROCEDURE — 25010000002 FLUOROURACIL PER 500 MG: Performed by: NURSE PRACTITIONER

## 2021-05-25 PROCEDURE — 25010000002 FOSAPREPITANT PER 1 MG: Performed by: NURSE PRACTITIONER

## 2021-05-25 PROCEDURE — 96367 TX/PROPH/DG ADDL SEQ IV INF: CPT

## 2021-05-25 PROCEDURE — 25010000002 PALONOSETRON PER 25 MCG: Performed by: NURSE PRACTITIONER

## 2021-05-25 PROCEDURE — 25010000002 LEUCOVORIN CALCIUM PER 50 MG: Performed by: NURSE PRACTITIONER

## 2021-05-25 PROCEDURE — 25010000002 BEVACIZUMAB PER 10 MG: Performed by: NURSE PRACTITIONER

## 2021-05-25 PROCEDURE — 80053 COMPREHEN METABOLIC PANEL: CPT

## 2021-05-25 PROCEDURE — 96375 TX/PRO/DX INJ NEW DRUG ADDON: CPT

## 2021-05-25 PROCEDURE — 25010000002 DEXAMETHASONE SODIUM PHOSPHATE 100 MG/10ML SOLUTION: Performed by: NURSE PRACTITIONER

## 2021-05-25 PROCEDURE — 96411 CHEMO IV PUSH ADDL DRUG: CPT

## 2021-05-25 PROCEDURE — 99214 OFFICE O/P EST MOD 30 MIN: CPT | Performed by: NURSE PRACTITIONER

## 2021-05-25 RX ORDER — SODIUM CHLORIDE 9 MG/ML
250 INJECTION, SOLUTION INTRAVENOUS ONCE
Status: CANCELLED | OUTPATIENT
Start: 2021-05-25

## 2021-05-25 RX ORDER — PALONOSETRON 0.05 MG/ML
0.25 INJECTION, SOLUTION INTRAVENOUS ONCE
Status: CANCELLED | OUTPATIENT
Start: 2021-05-25

## 2021-05-25 RX ORDER — SODIUM CHLORIDE 9 MG/ML
250 INJECTION, SOLUTION INTRAVENOUS ONCE
Status: COMPLETED | OUTPATIENT
Start: 2021-05-25 | End: 2021-05-25

## 2021-05-25 RX ORDER — PALONOSETRON 0.05 MG/ML
0.25 INJECTION, SOLUTION INTRAVENOUS ONCE
Status: COMPLETED | OUTPATIENT
Start: 2021-05-25 | End: 2021-05-25

## 2021-05-25 RX ADMIN — SODIUM CHLORIDE 250 ML: 9 INJECTION, SOLUTION INTRAVENOUS at 14:17

## 2021-05-25 RX ADMIN — BEVACIZUMAB 900 MG: 400 INJECTION, SOLUTION INTRAVENOUS at 15:35

## 2021-05-25 RX ADMIN — DEXAMETHASONE SODIUM PHOSPHATE 12 MG: 10 INJECTION, SOLUTION INTRAMUSCULAR; INTRAVENOUS at 15:04

## 2021-05-25 RX ADMIN — FLUOROURACIL 4850 MG: 50 INJECTION, SOLUTION INTRAVENOUS at 16:45

## 2021-05-25 RX ADMIN — SODIUM CHLORIDE 810 MG: 900 INJECTION, SOLUTION INTRAVENOUS at 16:10

## 2021-05-25 RX ADMIN — PALONOSETRON 0.25 MG: 0.05 INJECTION, SOLUTION INTRAVENOUS at 14:29

## 2021-05-25 RX ADMIN — SODIUM CHLORIDE 100 ML: 9 INJECTION, SOLUTION INTRAVENOUS at 14:29

## 2021-05-25 NOTE — PROGRESS NOTES
REASON FOR FOLLOW UP:     1. Rectal cancer, rectal ultrasound examination in July 2019 reported T3N0 disease without lymphadenopathy. She does have small pulmonary nodule 6-7 mm and 2 mm with indeterminate feature. There is no solid evidence of metastatic disease otherwise. Patient has stage IIA (T3N0M0) disease.  2. The patient is heterozygous factor V Leiden, was on prophylactic anticoagulation with Lovenox 40 mg daily given her increased risk of thrombosis with MediPort and GI malignancy.   3. PET scan on 8/8/2019 reported an 8 mm hypermetabolic right upper lobe pulmonary nodule, which is suspicious for metastatic as well.    4. Patient had a PowerPort placement on the left upper chest by Dr. Joseph on 8/9/2019.  5. Patient was started on concurrent infusional 5-FU chemoradiation therapy on 8/12/2019, with planned complete surgical resection and further adjuvant chemotherapy with intention to cure the disease.   6. Patient underwent surgical resection of the primary rectal cancer by Dr. Ye on 12/2/2019.  Pathology evaluation reported residual T3N1 disease stage IIIb.  7. Diarrhea related to therapy and radiation.   8. Patient was started cycle 1 day 1 of adjuvant FOLFOX 6 on 1/23/2020.  9. On 2/5/2020 FOLFOX 6 cycle 2 delayed secondary to neutropenia.  Patient was given 3 days of Granix injection.  After cycle #2 we planned 3 days of Granix with each cycle.   10. Patient also intolerant of oral iron.  Patient received 2 doses of IV injectafer on 02/05/2020 and 02/12/2020.   11. 02/12/2020 Proceed with cycle #2 of FOLFOX 6 with 3 days of Granix.  12. On 3/11/2020 cycle 4 postponed secondary to abdominal pain and occasional low-grade fevers.  CT scans ordered.  13. Cycle #4 resumed after CT scan revealed no evidence of disease.  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 Ye. patient has no fever.  Continue to observe.   14. Grade 3  hand-foot syndrome secondary to 5-FU after cycle #7 FOLFOX 6 chemotherapy, prompting ER visit.  Also has worsening peripheral neuropathy.   15. Cycle #8 FOLFOX 6 was given on 5/13/2020, with 50% dose reduction for both 5-FU and oxaliplatin, and also examination of bolus 5-FU.   16. PET scan examination on 5/21/2020 reported no evidence of metastatic disease in the chest abdomen pelvis.  Stable 6 mm RUL pulmonary nodule.  17. On 5/27/2020, I discussed with the patient that we can discontinue chemotherapy at this time.   18. Patient had a surgical procedure for low anterior colon resection, coloanal anastomosis on 8/3/2020.  19. CT scan for chest abdomen pelvis on 9/9/2020 reported a new 8 mm noncalcified pulmonary nodule in the left lower lobe of the lung.  Otherwise stable right upper lobe 6 mm pulmonary nodule, and no evidence of disease recurrence in the abdomen or pelvis.  20. PET scan examination on 9/18/2020 reported multiple hypermetabolic small pulmonary nodules/ new pulmonary nodules.   21. Patient was started on pelvic chemotherapy with FOLFIRI regimen on 10/13/2020.   22. 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 It reported tumor mutation burden 5 Muts/Mb, microsatellite stable, TP53 mutation R282W, and others.   23. Cycle #5 was without irinotecan, due to peripheral neuropathy.  24. 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  Patient was switched to weight-based Lovenox injection.  25. Cycle #6 5-FU and irinotecan was delayed by 2 weeks because of the above incident.  26. Patient had a telemedicine evaluation at that the [facility] [place] in February 2021.  They agreed with our treatment plan for palliative chemotherapy followed by maintenance chemotherapy.       HISTORY OF PRESENT ILLNESS:  The patient is a 40 y.o. female with the above-mentioned  history who is here today for lab review and evaluation and continued palliative treatment now with 5-FU, leucovorin, and Avastin.  Patient did have a brief trial of Xeloda, but quickly developed significant hand-foot syndrome thus it was discontinued.  She requested that we instead switch back to the 5-FU leucovorin, which we did with her last cycle of treatment.  The patient at that time had taken only 1 dose of Xeloda, and she states that she did immediately develop some worsening of the dryness and redness in her hands that was short-lived.  She continues to have hyperpigmentation of her hands bilaterally although it is improving.  She is trying to be more aggressive with hydration of her hands and feet.    Otherwise she is doing okay.  She continues on lovenox and denies bleeding issues.  She does continue to have a bruising and nodules on her abdomen from Lovenox injections.  Her ostomy is functioning well.  No new problems with pain.    Past Medical History:   Diagnosis Date   • Abdominopelvic abscess (CMS/HCC) 08/12/2020    ADMITTED TO [facility]   • Abnormal Pap smear of cervix 02/02/1998    JULIUS I   • Anemia in pregnancy    • Anxiety    • Bilateral epiphora 04/2020   • Bilateral hand swelling 05/02/2020    SEEN AT [facility] ER   • Cervical lymphadenitis 06/06/2012    SEEN AT [facility] ER   • Chemotherapy induced neutropenia (CMS/HCC) 04/2020   • Chemotherapy-induced nausea 02/2020   • Chemotherapy-induced thrombocytopenia 05/2020   • Chronic diarrhea    • Colon polyps     FOLLOWED BY DR. GERONIMO ESPARZA   • Cystitis 04/24/2020    WITH DEHYDRATION, ADMITTED TO [facility]   • Cystitis 10/27/2020   • Drug-induced peripheral neuropathy (CMS/HCC) 05/2020   • Fistula of intestine    • GERD (gastroesophageal reflux disease)    • Hand foot syndrome 05/2020   • Heart murmur     IN CHILDHOOD   • Hematochezia    • Hemorrhoids    • Heterozygous factor V Leiden mutation (CMS/HCC)     DX 8-2-2019   • History of anemia    • History of chemotherapy      FOLLOWED BY DR. ALEXANDRU HUNT   • History of gestational diabetes    • History of pre-eclampsia    • History of radiation therapy     FOLLOWED BY DR. JAVON LEWIS   • History of TB skin testing 2009    TB Skin Test   • HPV in female    • Hypokalemia 2019   • Hypomagnesemia 2019   • IBS (irritable bowel syndrome)    • Ileostomy in place (CMS/LTAC, located within [facility])     FOLLOWED BY DR. GERONIMO YE   • Infected insect bite of neck 2016    SEEN AT [facility]   • Kidney stone    • Kidney stones 2007    SEEN AT [facility] ER   • Lump of right breast     SWOLLEN LYMPH NODE   • Lung cancer (CMS/HCC) 2020    METASTATIC LUNG CANCER   • Monopolar depression (CMS/HCC)    • On anticoagulant therapy    • Perirectal abscess 2020   • PIH (pregnancy induced hypertension)    • Pulmonary embolism without acute cor pulmonale (CMS/HCC) 09/20/2019    X 3; ADMITTED TO [facility]   • Pulmonary nodule, right 2020   • Rectal cancer (CMS/HCC) 2019    STAGE IIA, INVASIVE MODERATELY DIFFERENTIATED ADENOCARCINOMA, CHEMO AND XRT FINISHED 2019   • Right shoulder pain 2020    SEEN AT [facility] ER   • Right ureteral stone 10/01/2019    SEEN AT [facility] ER   • SAB (spontaneous ) 1996     A2-1 INDUCED   • Sciatica    • Sepsis due to cellulitis (CMS/HCC) 2002    LEFT GREAT TOE, ADMITTED TO [facility]   • Tachycardia 2020   • Urinary urgency 2020     Past Surgical History:   Procedure Laterality Date   • CHOLECYSTECTOMY N/A 10/10/2003    LAPAROSCOPIC WITH CHOLANGIOGRAM, DR. JAMEY TALAVERA AT [facility]   • COLON RESECTION N/A 2019    Procedure: laparoscopic low anterior colon resection with mobilization of splenic flexure and diverting loop ileostomy: ERAS;  Surgeon: Geronimo Ye MD;  Location: [facility] OR;  Service: General   • COLON RESECTION N/A 8/3/2020    Procedure: LOW ANTERIOR COLON RESECTION, COLOANAL ANASTOMOSIS, MOBILIZATION SPLENIC FLEXURE;  Surgeon: Geronimo Ye MD;   Location: [facility] MAIN OR;  Service: General;  Laterality: N/A;   • COLONOSCOPY N/A 7/15/2020    PATENT ANASTAMOSIS IN MID RECTUM, RESCOPE IN 1 YR, DR. GERONIMO YE AT [facility]   • COLONOSCOPY W/ POLYPECTOMY N/A 07/08/2019    15 MM TUBULOVILLOUS ADENOMA POLYP IN HEPATIC FLEXURE, 20 MMTUBULOVILLOUS ADENOMA WITH HIGH GRADE DYSPLASIA IN RECTOSIGMOID, 4 CM MASS IN MID RECTUM, PATH: INVASIVE MODERATELY DIFFERENTIATED ADENOCARCINOMA, DR. JENNIFER LI AT [facility]   • DILATATION AND EVACUATION N/A 2009   • ENDOSCOPY N/A 07/08/2019    LA GRADE A ESOPHAGITIS, GASTRITIS, ALL BIOPSIES BENIGN, DR. JENNIFER LI AT [facility]   • INCISION AND DRAINAGE PERIRECTAL ABSCESS N/A 8/14/2020    Procedure: INCISION AND DRAINAGE OF retrorectal dehiscence abcess with drain placement and irrigation;  Surgeon: Geronimo Ye MD;  Location: [facility] MAIN OR;  Service: General;  Laterality: N/A;   • PAP SMEAR N/A 01/24/2014   • SIGMOIDOSCOPY N/A 7/24/2019    INFILTRATIVE PARTIALLY OBSTRUCTING LARGE RECTAL CANCER, AREA TATOOED, DR. GERONIMO YE AT [facility]   • SIGMOIDOSCOPY N/A 11/23/2019    AN ULCERATED PARTIALLY OBSTRUCTING MASS IN MID RECTUM, AREA TATTOOED, DR. GERONIMO YE AT [facility]   • TRANSRECTAL ULTRASOUND N/A 7/24/2019    Procedure: ULTRASOUND TRANSRECTAL;  Surgeon: Geronimo Ye MD;  Location: [facility] ENDOSCOPY;  Service: General   • URETEROSCOPY LASER LITHOTRIPSY WITH STENT INSERTION Right 10/30/2019    DR. ESTUARDO BEASLEY AT [place]   • VAGINAL DELIVERY N/A 09/18/1998   • VENOUS ACCESS DEVICE (PORT) INSERTION Left 8/9/2019    Procedure: INSERTION VENOUS ACCESS DEVICE;  Surgeon: Sj Joseph MD;  Location: [facility] OR [facility]SC;  Service: General   • VENOUS ACCESS DEVICE (PORT) INSERTION N/A 12/18/2020    Procedure: INSERTION VENOUS ACCESS DEVICE right side, removal venous access device left side;  Surgeon: Sj Joseph MD;  Location: [facility] OR [facility];  Service: General;  Laterality: N/A;   • WISDOM TOOTH EXTRACTION Bilateral  1993       HEMATOLOGIC/ONCOLOGIC HISTORY:      The patient reports she has intermittent rectal bleeding and urgency, mucous with her stool, starting sometime in 2016. At that time she was referred to GI service, and was diagnosed of irritable bowel syndrome. Nevertheless she had worsening urgency for bowel movement, and had a couple of incidents losing control of stool. She was recently seen by Roland Thorpe MD again and had colonoscopy and EGD exam on 07/08/2019. She was found having a circumferential rectal mass. According to the procedure note, the patient had a fungating circumferential bleeding 4 cm mass in the middle rectum, at distance between 13 cm and 17 cm from the anus. Mass was causing partial obstruction. There were also colon polyps found at the hepatic flexure and also at the rectosigmoid junction 23 cm from the anus. Both were resected and retrieved. EGD examination reported grade A esophagitis at the GE junction and patchy discontinuous erythema and aggravation of the mucosa without bleeding in the stomach body. There is normal mucosa of the duodenum. Pathology evaluation from this procedure reported moderately differentiated adenocarcinoma involving the rectal mass. The rectal sigmoid junction polyp was tubular/tubulovillous adenoma with high grade dysplasia negative for invasive malignancy. The hepatic flexure polyp was also tubular/tubulovillous adenoma negative for high grade dysplasia or malignancy. The biopsy from the esophagus reports squamocolumnar mucosa with inflammatory changes suggestive of mild reflux esophagitis, negative for interstitial metaplasia. Gastric biopsy was benign and duodenal biopsy was also benign. There is no mention of H-pylori.     Patient was subsequently referred to colorectal surgeon Padmaja Ye MD for further evaluation. The patient had CT scan examination for chest, abdomen and pelvis requested by Dr. Ye and were done on 07/13/2019. The study reported no  evidence of lymphadenopathy in the abdomen and pelvis. There was wall thickening of the rectosigmoid junction. Normal spleen, pancreas, adrenal glands and kidneys. There was fatty liver infiltration but no focal lymphatic lesions. There was a small 6-7 mm noncalcified nodule in the right upper lobe and a tiny 3 mm subpleural nodule in the right middle lobe. No mediastinal or hilar lymphadenopathy.     Dr. Ye performed staging rectal ultrasound examination. This study reported tumor penetrating through the muscularis propria as T3 disease; there were no lymph nodes identified.    She had a hospitalization in late September 2019 because of newly discovered pulmonary emboli.  She was on prophylactic Lovenox prior to the incident of PE.  Now she is on full dose Xarelto anticoagulation.  Patient reports no further chest pain dyspnea.  She denies bleeding or bruising.  During her hospitalization, she was seen by Dr. Ye, who plans to have surgery 8 to 12 weeks after finishing radiation therapy.  She finished her radiation on 9/19/2019.     I noticed patient also presented to the emergency room on 10/1/2019 complaining of right flank area pain.  I reviewed the images studies and indeed she has a very small 1 to 2 mm obstructing kidney stone in the UV junction.  Patient is still symptomatic with some pains and dysuria.  She denies fever sweating or chills.    Laboratory study on 10/7/2019 reported normal CBC including hemoglobin 13.1, platelets 301,000, WBC 6170 and ANC 4900 lymphocytes 590 monocytes 480.      The nurse reported malfunction of port-a-catheter on 10/7/2019.  Port study on 10/8/2019 showed fibrin sheath around the distal aspect of the Mediport catheter in the SVC. This does not appear to hinder injection, but does prevent aspiration at this time.    Patient underwent surgical resection of the colon on 12/2/2019 with Dr. Ye.  Pathology evaluation reported residual T3 disease, also 1 out of 14 lymph  nodes positive for malignancy.  So this patient in either had at least stage IIIb disease (T3 N1 M0?).      Adjuvant chemotherapy FOLFOX 6 will be started on 1/22/2020.  Laboratory study reported iron saturation 10%, free iron 31 TIBC 319 and ferritin 168.  Her hemoglobin was 11.8, WBC 5600, and platelets 347,000.    Patient was here on 02/12/2020 for cycle 2 of her FOLFOX.  This is delayed x1 week secondary to neutropenia.  The patient ultimately received 3 days worth of Neupogen with recovery of her blood counts.  Of note, the patient struggled with significant nausea without any episodes of vomiting following cycle #1 of chemotherapy resulting in significant weight loss.  She is up 12 pounds over the last week as her appetite has normalized.  We will add Emend to her care plan.    She is having several loose stools per her colostomy and has been hesitant to take Imodium due to prior history of constipation.  I encouraged her to try this with a maximum of 8 tablets/day.  She denies any infectious symptoms including fevers or chills.  No excess bleeding or bruising noted.  She had the expected cold sensitivity related to the Oxaliplatin for about 3 days following treatment.    Labs from 02/12/2020 demonstrated total white blood cell count of 5.14, ANC of 3.06, hemoglobin of 11.2, platelets of 211,000.  She was found to be iron deficient last week and is intolerant to oral iron secondary to GI distress.  For this reason, she initiated IV iron therapy with Injectafer last week.  She had received her second dose 02/12/2020    Patient has normalized hemoglobin 12.2 on 2/26/2020.    She reported on 5/5/2020 she had a recent visit to the emergency department for what was suspected to be an allergic reaction.  She called our on-call service on Saturday with reports of hand and face swelling.  She was instructed to proceed to the emergency department at which time she was assessed and prescribed a Medrol Dosepak.  She had  just completed her 5-FU and was unhooked on Friday, 5/3/2020.  Her symptoms have improved.  She does report persistent hyperpigmentation and mild swelling of the palms of the hands but this is much improved.  It was her right hand specifically that was swollen.  Her facial swelling has resolved.  She continues on Cefdinir nd since has 1 day remaining, she was prescribed cefdinir for a UTI requiring hospitalization at the end of April.  Reports no new symptoms.  Her labs are stable.  She is scheduled for treatment again.    Patient states at this time she is not tolerating her chemotherapy well.     Patient seen in the emergency department on 5/2/2020 for what was suspected to be an allergic reaction.  She called our on-call service on Saturday explaining that she was experiencing hand and face swelling.  She was instructed to proceed to the emergency department at which time she was assessed and prescribed a Medrol Dosepak.  She had just completed her 5-FU and was unhooked on Friday, 5/1/2020.      She reports since her ED visit on 5/2/2020 her symptoms have not improved. Her hands and feet were swollen upon presenting to the ED and she could barely make a fist. She states that she feels so swollen she is not able to stand it. She states that her skin on the hands and feet are peeling extensively as well, besides changing color to more dark.     She also reports significant fatigue after her first week of the 5-FU treatment but she expected this side effect. She also notices significant increase in her neuropathy to the point that she is not able to even walk around in her home without her house slippers due to irritation from her rugs. She denies and associated nausea or vomiting at this time. She also has episodes of epistaxis every day after the chemotherapy cycle #7.  She does report working full-time during the week of chemotherapy.    Laboratory studies, 5/13/2020, show moderate thrombocytopenia platelets  123,000, low normal WBC 4140 including ANC 2720 and normal hemoglobin 13.4.  Because significant hand-foot syndrome, will decrease both 5-FU and oxaliplatin by 50%, and eliminate bolus dose of 5-FU.    On 5/21/2020 patient had a PET scan performed which showed no convincing evidence of residual disease in abdomen or pelvis, no metastatic disease within the chest or neck.     Cycle #8 FOLFOX 6 was given on 5/13/2020, with 50% dose reduction for both 5-FU and oxaliplatin, and also examination of bolus 5-FU.  She states on 5/27/2020 that with the recent reduction of the chemotherapy she feels significantly better. She has more energy and is able to do her daily routine.      PET scan examination on 5/21/2020 reported no evidence of metastatic disease in chest abdomen pelvis.  There was a stable 6 mm right upper lobe pulmonary nodule.    Laboratory studies on 5/27/2020 showed mild leukocytopenia WBC 3720 but a normal ANC 2250 and lymphocytes 630.  Normal platelets 163,000 and hemoglobin 12.6.  Chemistry lab reported normal renal function, liver function panel and a electrolytes, elevated glucose 164.    Laboratory studies 6/24/2020, showed normal hemoglobin 13.4 but macrocytosis .9.  Normal platelets 210,000 and WBC 5870.  She had normal CMP.     Patient last time was here in late June 2020.  Since that time she had surgical procedure for low anterior colon resection, coloanal anastomosis on 8/3/2020.  She later developed a perirectal abscess, required surgical drainage on 8/14/2020.  She was discharged on 8/27/2020.    Patient reports to me she still has lower abdominal wall vacuum suction in place.  She denies fever sweating chills.  Performance status is ECOG 1.  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  She does have visiting nurse come to the home for wound care.    CT scan for chest abdomen pelvis on 9/9/2020 reported a new 8 mm noncalcified pulmonary nodule in the left lower  lobe.  Otherwise stable right upper lobe 6 mm pulmonary nodule, and no evidence of disease recurrence in the abdomen or pelvis.     Laboratory study on 9/16/2020 reported elevated AST 55, ALT 95, alk phosphatase 143 but normal total bilirubin 0.3.  Chemistry lab otherwise unremarkable.  She has completed normal CBC.      Because of the abnormal CT scan examination for chest abdomen pelvis on 9/9/2020 reported a new 8 mm noncalcified pulmonary nodule in the left lower lobe, we obtained a PET scan examination for further evaluation, which was done on 9/18/2020.  This study reported several pulmonary nodules, some of them are hypermetabolic, newly developed compared to previous PET scan in May 2020.  Certainly does highly suspicious for metastatic disease.  There are also hypermetabolic activity in the abdomen and pelvic area which is hard to differentiate from surgical scar versus metastatic malignancy.    Laboratory study on 10/13/2020 reported normal CBC with Hb 13.9, platelets 302,000 and WBC 5520 including ANC 3830.  Chemistry lab reported normalized AST 20, alk phosphatase 116 improved ALT 35, and maintains normal bilirubin, with normal electrolytes and liver function panel.     Patient was started on first cycle of palliative chemotherapy FOLFIRI on 10/13/2020.    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  Patient had a new port placed 12/18/2020, and had held her Xarelto for 2 days prior to surgery.  She presented to the ER on 12/21/20 with complaints of facial and arm swelling for 3 days.  She also noted increased shortness of breath.  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  Thrombus within the right internal jugular vein cannot be excluded.  Patient was started on IV heparin, and eventually transitioned to  weight-based Lovenox, which she now continues.      MEDICATIONS    Current Outpatient Medications:   •  clonazePAM (KlonoPIN) 1 MG tablet, TAKE 1 TABLET BY MOUTH THREE TIMES A DAY AS NEEDED FOR ANXIETY OR SEIZURES, Disp: 90 tablet, Rfl: 0  •  dicyclomine (BENTYL) 20 MG tablet, TAKE 1 TABLET BY MOUTH EVERY 6 HOURS AS NEEDED, Disp: 360 tablet, Rfl: 0  •  diphenoxylate-atropine (LOMOTIL) 2.5-0.025 MG per tablet, TAKE 1 TABLET BY MOUTH 4 (FOUR) TIMES A DAY AS NEEDED FOR DIARRHEA., Disp: 120 tablet, Rfl: 1  •  enoxaparin (Lovenox) 100 MG/ML solution syringe, Inject 1 mL under the skin into the appropriate area as directed Every 12 (Twelve) Hours., Disp: 60 mL, Rfl: 2  •  escitalopram (LEXAPRO) 10 MG tablet, TAKE 1 TABLET BY MOUTH EVERY DAY, Disp: 30 tablet, Rfl: 5  •  famotidine (PEPCID) 20 MG tablet, TAKE 1 TABLET BY MOUTH TWICE A DAY, Disp: 180 tablet, Rfl: 1  •  Loratadine (CLARITIN) 10 MG capsule, Take 10 mg by mouth Every Evening., Disp: , Rfl:   •  mineral oil-hydrophilic petrolatum (AQUAPHOR) ointment, Apply 1 application topically to the appropriate area as directed As Needed for Dry Skin., Disp: , Rfl:   •  ondansetron (ZOFRAN) 8 MG tablet, Take 1 tablet by mouth 3 (Three) Times a Day As Needed for Nausea or Vomiting., Disp: 60 tablet, Rfl: 5  •  prochlorperazine (COMPAZINE) 10 MG tablet, Take 1 tablet by mouth Every 6 (Six) Hours As Needed for Nausea or Vomiting., Disp: 30 tablet, Rfl: 2  •  promethazine (PHENERGAN) 25 MG tablet, Take 1 tablet by mouth Every 6 (Six) Hours As Needed for Nausea or Vomiting., Disp: 60 tablet, Rfl: 2  No current facility-administered medications for this visit.    Facility-Administered Medications Ordered in Other Visits:   •  fluorouracil (ADRUCIL) 4,850 mg, sodium chloride 0.9 % 143 mL 240 mL chemo infusion - FOR HOME USE, 2,400 mg/m2 (Treatment Plan Recorded), Intravenous, Once, Polly, Sheba L, APRN  •  leucovorin 810 mg in sodium chloride 0.9 % 290.5 mL IVPB, 400 mg/m2  "(Treatment Plan Recorded), Intravenous, Once, Sheba Matias, JULIET    ALLERGIES:   No Known Allergies    SOCIAL HISTORY:       Social History     Tobacco Use   • Smoking status: Current Every Day Smoker     Packs/day: 0.50     Years: 24.00     Pack years: 12.00     Types: Electronic Cigarette, Cigarettes   • Smokeless tobacco: Never Used   Vaping Use   • Vaping Use: Some days   • Substances: Nicotine   • Devices: Disposable   Substance Use Topics   • Alcohol use: Not Currently     Comment: Caffeine use rare   • Drug use: No         FAMILY HISTORY:   Mother has positive factor V Leiden mutation, although she did not have thrombosis, mother also is coronary disease with stenting, she also is occasional bruising.  Maternal grandmother had DVT, she had multiple surgical procedures.  Patient mother's half-brother had metastatic colon cancer at diagnosis in his 50s.  Maternal great aunt has breast cancer.  Patient will follow his skin cancer in his 60s, exclusive.         Vitals:    05/25/21 1313   BP: 137/96   Pulse: 118   Resp: 18   Temp: 97.3 °F (36.3 °C)   TempSrc: Temporal   SpO2: 95%   Weight: 88.6 kg (195 lb 6.4 oz)   Height: 166 cm (65.35\")   PainSc: 0-No pain     Current Status 5/25/2021   ECOG score 0     Physical Exam  Vitals reviewed.   Constitutional:       General: She is not in acute distress.     Appearance: Normal appearance. She is well-developed.   HENT:      Head: Normocephalic and atraumatic.   Eyes:      Pupils: Pupils are equal, round, and reactive to light.   Cardiovascular:      Rate and Rhythm: Normal rate and regular rhythm.      Heart sounds: Normal heart sounds. No murmur heard.     Pulmonary:      Effort: Pulmonary effort is normal. No respiratory distress.      Breath sounds: Normal breath sounds. No wheezing.   Abdominal:      General: Bowel sounds are normal. There is no distension.      Palpations: Abdomen is soft. There is no mass.      Comments: Ostomy in place with liquid brown " stool.  Scattered bruises and noduled on the abdomen bilaterally from Lovenox injections.   Musculoskeletal:      Cervical back: Neck supple.      Right lower leg: No edema.      Left lower leg: No edema.   Lymphadenopathy:      Cervical: No cervical adenopathy.   Skin:     General: Skin is warm and dry.      Findings: No lesion or rash.      Comments: hyperpigmentation of the palms and feet, with dryness improved     Neurological:      Mental Status: She is alert and oriented to person, place, and time.     05/25/2021 physical exam is unchanged from above except as updated.    RECENT LABS:  Results from last 7 days   Lab Units 05/25/21  1252   WBC 10*3/mm3 4.21   NEUTROS ABS 10*3/mm3 2.35   HEMOGLOBIN g/dL 15.6   HEMATOCRIT % 44.7   PLATELETS 10*3/mm3 189     Results from last 7 days   Lab Units 05/25/21  1251   SODIUM mmol/L 138   POTASSIUM mmol/L 4.1   CHLORIDE mmol/L 102   CO2 mmol/L 27.0   BUN mg/dL 8   CREATININE mg/dL 0.85   CALCIUM mg/dL 9.3   ALBUMIN g/dL 3.90   BILIRUBIN mg/dL 0.3   ALK PHOS U/L 93   ALT (SGPT) U/L 57*   AST (SGOT) U/L 33*   GLUCOSE mg/dL 113           Assessment/Plan      ASSESSMENT:   1.  Rectal cancer, rectal ultrasound examination reported T3N0 disease without lymphadenopathy.   · CT scan of chest, abdomen and pelvis reported no lymphadenopathy in the abdomen, pelvis or chest. She does have small pulmonary nodule 6-7 mm and 2 mm with indeterminate feature. There is no solid evidence of metastatic disease otherwise.   · Based on the CT scan and rectal ultrasound imaging studies, this patient had stage IIA (T3N0M0) disease.    · She had PET scan examination on 8/8/2019 which reported a hypermetabolic right upper lobe pulmonary nodule 6 mm with SUV 5.6.  This is suspicious for metastatic disease however it is too small to be biopsied.  This patient may have stage IV disease.   · She initiated concurrent radiation with continuous 5-FU on 8/12/2019.  · Patient finished concurrent  chemoradiation therapy.  · Patient underwent surgical resection of the rectal tumor and diverting loop ileostomy on 12/2/2019 with Dr. Ye.  Pathology evaluation reported residual T3 disease, with 1 out of 14 lymph nodes positive for malignancy.  Certainly this patient has at least stage IIIb rectal cancer (T3 N1 M0?)  · On 1/22/2020 adjuvant chemotherapy FOLFOX 6 regimen initiated.    · On 2/5/2022 cycle #2 was delayed secondary to neutropenia.  She was given 3 days of Granix.   · Emend added with cycle 3.  With improved nausea control  · Continuing home Granix x3 days following 5-FU disconnect  · 3/11/2020 due for cycle 4, however, she is experiencing progressive abdominal pain and occasional fevers.   · CT scan performed on 3/13/2020 reveals no evidence of progressive or recurrent disease.  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  · Patient hospitalized 4/24-4/26/20 after cycle 5 of chemotherapy with acute UTI.  CT abdomen/pelvis noting fluid collection in the presacral region having diminished in size compared to CT dated 3/13/2020.  Patient was evaluated by Dr. Ye while in the hospital with further plans to evaluate possible colovaginal fistula following completion of chemotherapy.  Patient did respond to IV antibiotics and discharged home on oral cefdinir.  · 4/29/2020 cycle 6 of FOLFOX.  Urinary symptoms have resolved   · Patient seen in the [facility] ED on 5/2/2020 for what was suspected to be an allergic reaction.  She called our service on Saturday explaining that she was experiencing hand and face swelling.  She was instructed to proceed to the emergency department at which time she was assessed and prescribed a Medrol Dosepak.  She had just completed her 5-FU and was unhooked on Friday, 5/1/2020.  Her symptoms have resolved in the office on assessment, 5/5/2020.  · The patient had grade 3 hand-foot syndrome based on symptomology.  Patient had cycle #8 of 5-FU on 5/13/2020. Due to her symptoms  and poor tolerance to the 5-FU, her treatment dose will be reduced 50% for oxaliplatin and infusional 5-FU.  Bolus 5-FU will be discontinued..  · On 5/13/2020  We discussed further treatment options pending the scan results.  If she has indeed residual disease or metastatic disease, we will switch her to irinotecan plus Avastin or anti-EGFR monoclonal antibody.  She will need a further molecular testing of her tumor samples in that case.  · On 5/21/2020 patient had a PET scan and it showed no evidence of of metastatic disease in the neck, chest, abdomen or pelvis.  There was fluid accumulation/abscess.   · On 5/27/2020 I discussed with the patient that we can discontinue chemotherapy at this time.  She will follow-up with Dr. Ye to discuss a possibility to reverse the ileostomy.  We likely will obtain anther PET scan in 3 to 4 months for reassessment.    · Patient was seen by Dr. Ye on 6/19/2019 for evaluation and to discuss possible take down of her ileostomy.  She is scheduled to have a gastrografin enema on 7/2/2020 to evaluate for a fistula, and then a colonoscopy on 7/15/2020, both done by Dr. Ye.  She states that based on the above imaging and procedure findings, she may have a more extensive surgery done or just have her ileostomy reversed, which would likely be done in August 2020.  This will all be coordinated under the care of Dr. Ye.     · I reviewed scan results with the patient on 9/16/2020 for the most recent CT scan from 09/09/2020 and also compared to her previous PET scan examination from 05/21/2020 and also the original PET scan from 08/09/2019.  The original PET scan there is a small right upper lobe pulmonary nodule 6 mm with SUV 5.6. So in the 05/2020 PET scan the nodule is still there but activity seems much less and this most recent CT scan examination also documented the preexisting small pulmonary nodule however there is a new left lower lobe pulmonary nodule 8 mm in size and I  shared with the patient today this nodule is suspicious for metastatic disease. Laboratory studies reported minimal CEA level 1.32 on 09/09/2020. Liver function panel was only marginally abnormal with the ALT 45, the remaining is normal. However laboratory study today showed worsening AST 55, ALT 95 and alkaline phosphatase 143 but is still normal, total bilirubin 0.3.   · So I discussed with the patient on 9/16/2020 recommended to have repeat PET scan examination for assessment because the left lower lobe pulmonary nodule is too small to be biopsied, and if the PET scan reports hypermetabolic lesion, I recommended to have stereotactic radiation therapy. Explained to patient that she is not a really good candidate to have thoracotomy for resection because she still has unhealed wound in the abdomen. I think the stereotactic radiation therapy will be a more feasible choice.  · Laboratory study on 9/16/2020 reported worsening liver function panel with both elevated AST, ALT and also slightly worsened alkaline phosphatase despite having normal total bilirubin. I recommended to repeat laboratory study for reevaluation. The only new medication she has in the past several days is Augmentin but otherwise no change of condition. She denies any recent viral infection. We will monitor this.   · PET scan examination obtained on 9/18/2020 showed metastatic disease.  It reported a few hypermetabolic pulmonary nodules, and some new small pulmonary nodules, all of them highly suspicious for metastatic disease.  She also has hypermetabolic activity in the abdomen and pelvic area which could be related to scar tissue from her recent surgery versus metastatic disease.  Nevertheless overall picture fits with metastatic cancer.  · Discussed with the patient on 9/20/2020, we reviewed the PET scan images together, and I recommended to have systematic chemotherapy, I do not think stereotactic reading therapy to the hypermetabolic lesions  in the lungs warranted at this time because even those will be treated with reading therapy, she will still need systematic chemotherapy.  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  I did discuss with the patient using anti-EGFR monoclonal antibody versus anti-VEGF monoclonal antibody.     · Palliative chemotherapy cycle#1 FOLFIRI was started on 10/13/2020.  No bolus 5-FU given considering previous poor tolerance.    · 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    · Discussed with the patient 10/27/2020, because of the K-s[place]ia mutation, she is not a candidate for anti-EGFR monoclonal antibody such as Erbitux or vectibix.  She could be a candidate for anti-VEGF monoclonal antibody, however because of active wound, she is not a candidate right now at this moment.  · Patient seen in the ED for acute right neck pain on 11/16/2020.  CT angiogram as well as CT of the cervical spine performed with no acute findings but notably one of her pulmonary nodules, left upper lobe, somewhat decreased in size.  Patient given prednisone pack.  Further details below.  · Patient due today for cycle #4 FOLFIRI.  Plan repeat PET scan after cycle 6.  · Last received cycle #5 chemotherapy without irinotecan on 12/8/2020.   · Patient here 12/29/2020 for evaluation and consideration of cycle 6, but she continues to complain of swelling.  She does have swelling typically after treatment as well.  We will hold treatment for 1 week and reevaluate next week.  Still planning on PET scan following cycle 6.   · Cycle #6 chemotherapy will be resumed on 1/5/2021.  Started back on original irinotecan.  · PET scan examination obtained on 1/12/2021 after cycle #6 chemotherapy reported improved pulmonary nodule hypermetabolic activity.  Confirmed these are metastatic lesions.  · Patient is evaluated 1/19/2020, will continue cycle #7 FOLFIRI  chemotherapy.  However Avastin will be on hold see next section.   · Patient was reevaluated on 2/2/2021, will continue chemotherapy cycle #8 FOLFIRI.  Avastin / biosimilar will be added.    · She talked to [facility] specialist in mid February 2021, and had recommended palliative chemotherapy without surgical intervention of metastatic lung lesions.   · 2/16/2021cycle #9 FOLFIRI plus bevacizumab.  Tolerated last cycle well with only some mild increase in liquid stools.  This was easily controlled with antidiarrheals.  · On 3/2/2021, patient will proceed with cycle #10 FOLFIRI plus bevacizumab.  After 12 cycles, plan to start maintenance treatment.  · 3/16/2021 cycle 11.  Plus bevacizumab.  · 3/30/2021 cycle 12 FOLFIRI plus bevacizumab.  Having issues with worsening nausea despite premeds with dexamethasone, Aloxi, Emend, and Zofran at home.  Patient is requesting a dose of Phenergan, which is helped her in the past.  We will give this to her with treatment today.  · PET scan examination on 4/6/2021 reported no evidence of hypermetabolic metastatic lesion.  · Discussed with the patient on 4/13/2021, we reviewed the PET scan results.  We recommended to switch to maintenance chemotherapy without irinotecan.  We will continue 5-FU and Avastin every 2 weeks.  We discussed possibility of switching to oral Xeloda treatment.  Discussed with the patient for side effects more so with hand-foot syndrome.  Patient is agreeable.   · Xeloda 2000 mg twice daily 7 days on, 7 days off along with Avastin initiated 4/27/2021.  · After only 5 doses of Xeloda significant hand-foot syndrome, Xeloda held.  Dose adjustment versus transition to 5-FU will be further discussed with Dr. Nguyen  · Patient returns 5/11/2021 for reevaluation.  She did take her first dose of Xeloda 1500 mg this morning, but is actually requesting to be transition back to infusional 5-FU, as she felt that she tolerated this without any  problem.  She states that being hooked up to the 5-FU ball does not bother her.  I have further discussed with Dr. Nguyen, and he is okay with this.  The patient will receive 5-FU leucovorin and Avastin every 2 weeks.  Xeloda will be discontinued.  · 5/25/2021 continued 5-FU, leucovorin, Avastin tolerating this much better so far, we will continue this.    2 .  Pulmonary nodule, hypermetabolic on PET scan.   · Her original PET scan examination from 8/8/2019 reported a small 8 mm right upper apex pulmonary nodule but hypermetabolic SUV 5.1.  That was too small to be biopsied, however there was always a possibility of metastatic disease considering that high activity despite a such a small nodule.  · Her chest CT scan examination from 3/13/2020 reported this shrank to 6 mm.    · PET scan examination on 5/21/2020 reported no metabolic activity at this residual nodule.  This needs to be monitored.    · PET scan examination 9/18/2020 reported couple of hypermetabolic pulmonary nodules, besides a few extra small pulmonary nodules.  Those are highly suspicious for metastatic disease.    · PET scan examination obtained on 1/12/2021 after cycle #6 chemotherapy reported improved a pulmonary nodule hypermetabolic activity.  Confirmed these are metastatic lesions.  · 1/19/2021, patient reports she will see thoracic surgeon tomorrow at the [facility] where she seeks second opinion evaluation, and to see whether she would be a candidate for resection of pulmonary nodules.  I discussed with the patient, she had at least 2 hypermetabolic lesions although they responded to chemotherapy, I do not think she would be a candidate for surgery.   · Thoracic surgeon from [facility] recommended metastectomy and to discontinue chemotherapy afterwards.   · Patient had a third opinion evaluation on 2/11/2021 by telemedicine with [facility] physician, who recommended palliative chemotherapy with no surgical  intervention for metastatic pulmonary lesions.    3.   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  Low-dose Lovenox discontinued and initiated Xarelto 20 mg daily.  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  · Patient held Xarelto for 2 days prior to MediPort removal from the left chest wall and placement of new port in the right chest wall on 12/18/2020.  She resumed Xarelto that evening.  · Progressive swelling in the neck, face, upper extremities prompting hospitalization with CT scan showing thrombosis in the right subclavian vein and SVC.  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  Although she had been off of Xarelto for a few days, clearly Xarelto was not prohibiting progression of her thrombosis after she resumed treatment and there was some evidence to suggest thrombosis had been present at least in the innominate vein on prior scan in September but now appears chronic.  Current acute thrombosis involving SVC and right subclavian.  · Patient was admitted and placed on heparin drip  · Patient was evaluated by vascular surgery and intervention was not recommended for thrombolysis/thrombectomy.  · On 12/22/2020, the patient developed worsening shortness of breath, increasing upper extremity edema consistent with worsening SVC syndrome.  Repeat CT angiogram chest was performed early on 12/23/2020 with findings of stable SVC and  brachiocephalic vein thrombosis, no evidence of pulmonary embolism.  · Symptoms improved and patient was discharged 12/24/2020 on Lovenox 100 mg every 12 hours.    · Returns 12/29/2020 for evaluation continuing Lovenox, overall tolerating it well.  No bleeding issues.  · Improved edema 1/5/2021.  Will continue Lovenox weight-based every 12 hours.  · On 1/19/2021 and 2/2/2021, patient reports improved facial swelling.  She however does have being bruise on her abdomen wall where she does Lovenox injection.  However she does not have a spontaneous epistaxis, gum bleeding or other bleeding.   · On 2/2/2021, we discussed that side effects of Avastin/biosimilar related to thrombosis.  Since this patient already has been on Lovenox, I think the benefits from treatment for her cancer will outweigh that risk of thrombosis, will proceed ahead with Avastin.  I cautioned patient to watch out for signs of worsening thrombosis patient voiced understanding.   · On 3/16/2021, no evidence of worsening DVT, continue Lovenox.  · 5/11/2021 Lovenox dosing will be adjusted due to patient's weight loss.  We discussed she needs 1 mg/kg.  Her syringes are 100 mg syringes she will do 0.9 mL subcu every 12 hours.    4.  This patient also has strong family history for malignancy the patient had appointment with genetic counseling on September 3, 2019.  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    5.  Mild anemia, improved since her surgery.    · She also has a history of iron deficiency.  Iron studies reveal a saturation of just 10%.  She was started on oral iron but was unable to tolerate due to GI side effects.   · Status post Injectafer 2/5/2020 and 2/12/2020   · Improved hemoglobin 13.5 on 1/5/2021.  · Hemoglobin 14.7 on 2/16/2021.    · Hemoglobin 16.2 on 3/2/2021.    · 3/16/2020 when hemoglobin now up to 16.2, hematocrit 47.6.  Patient admits that she has started smoking again, and I have encouraged her to quit.  She denies any snoring  or sleep apnea diagnosis.  Patient is not taking oral iron.  We will closely monitor.  · 3/30/2020 hemoglobin 15.7, HCT 47.5.  Patient states that she has cut back significantly on her smoking, although not quit yet.  I have encouraged her to continue working on this.  We will continue to closely monitor.  · Hemoglobin 15.6 today.    6.  Peripheral neuropathy secondary to chemotherapy.    · This is mild involving bilateral hands and feet as reported on 9/16/2020.   · Will avoid chemotherapy with oxaliplatin.  · Stable mild neuropathy as of 11/10/2020  · Patient reports worsening peripheral neuropathy and cycle #5 chemotherapy on 12/8/2020 with and without irinotecan.   · On 1/5/2021, patient reports improvement of peripheral neuropathy, will add irinotecan back to chemotherapy.  Continue to monitor and adjust medication.  · On 3/2/2021, patient reports no worsening of peripheral neuropathy after restarting irinotecan.   · As of 5/25/2021 her neuropathy is stable.    7.  History of hand-foot syndrome grade 3.    · It become so significant after cycle #7 FOLFOX treatment.  Discussed with patient on 5/13/2020, will discontinue the bolus 5-FU dose, and also decrease 50% of the infusion dose through the pump.   · We also use Medrol Dosepak for possible recurrent symptomology.  She responded this well.   · Her hand-foot syndrome improved.  · Under current treatment with FOLFIRI, patient not receiving 5-FU bolus.  No recurrent issues.   · Patient will be switched to maintenance chemotherapy using Xeloda in late April 2021.  · Following 5 doses of Xeloda, significant hand-foot syndrome with pain in the feet, significant erythema.  Xeloda held.  Will be further discussed with Dr. Nguyen to determine if the patient will resume 5-FU versus a dose reduction to Xeloda.  · Aggressive emollient moisturizer use twice daily for the past week and patient reports improvement in the dryness and erythema.  Xeloda will now be discontinued  and patient will switch back to 5-FU.  · As of 5/25/2021 dryness and erythema/hyperpigmentation continues to improve.  Xeloda has been discontinued.    8.  Hyperlacrimation and mild scleral irritation related to 5-FU.  She has been taking steroid eyedrops.  This has improved her hyperlacrimation.  · Not currently an issue.  Continue to monitor with 5-FU.      9.  Abnormal liver function panel.  · Improved liver function panel 9/18/2020.  This is probably related to her recent infection.  · She has normalized AST, alk phosphatase, and a much improved only marginally elevated ALT 35 on 10/13/2020.    · Normalized liver function panel on 10/27/2020.    · Normal liver panel on 11/10/2020.    · Normal liver function panel on 12/5/2021.    · Slightly worsening liver function with AST 33 and ALT 56 1/19/2021.    · Improved AST 21 and ALT 39 on 2/2/2021.    · ALT 59 and AST 29 on 3/2/2021.    · ALT 56, AST 24, alk phosphatase 121 and total bilirubin 0.3 on 4/13/2021.  Continue to monitor.      10.  Intermittent leukocytopenia secondary to chemotherapy.   · WBC currently normal at 5220 and the neutrophil 3520 on 1/5/2021.  Continue to monitor.    · On 2/2/2021, WBC 4110 including ANC 2540.  Continue to monitor for now.  Consider G-CSF if neutropenia becomes an ongoing issue.   · 2/16/2021 WBC 4.6, ANC 2.79.    · 3/16/2021 WBC 3.93, ANC 2.26, continue to monitor.  · On 4/13/2021 WBC 4250 including ANC 2640.  · 5/25/2021 WBC 4.21 ANC 2.35.  Continue to monitor.    11.  Depression.  Patient seen by JULIET Davenport on 11/9/2020.  She was started on mirtazapine.  Lexapro was discontinued.  This has definitely improved her mood with the patient feeling overall much better.  She is sleeping better.  Appetite is improved with her actually eating more than she wants to.    · Condition is stable.  She plans to continue follow-up with supportive oncology.    12.  Intermittent upper abdominal discomfort.  This improves with eating.   After further discussion with the patient she also notes 3 nights of increased reflux when laying down.  We discussed her symptoms could be related to increase stomach acid or potential ulcer.  She is currently taking Pepcid 20 mg daily.  I instructed her to add Prilosec 20 mg daily.   · On 3/16/2021 patient reports some worsening reflux symptoms last week.  She has increased her Pepcid to 20 mg twice daily, which did resolve her symptoms.  We discussed she can add Prilosec 20 mg daily as well.   · Not a complaint today.  Continue to monitor.    13.  Proteinuria: 3/30/2020: Patient is 2+ protein in her urine.  We will continue with Avastin and closely monitor.  No need for 24-hour urine at this time.  · Persistent 2+ proteinuria on 4/13/2021.  continue to monitor.  · 5/25/2021 protein urine only 1+.  Continue to monitor.    14.  Dysuria is reported on 4/13/2021.  We will start the patient on cefdinir 300 mg twice a day for 5 days.  Will send urine culture.  Resolved.      15.  Tachycardia: Heart rate 124 today, regular.  Patient denies chest pain or palpitations.  She does report some increase in shortness of breath, particularly with exertion, although this is been present since her diagnosis of PE.  Patient states she was on metoprolol at some point, although it was discontinued and she cannot recall why.  We will go ahead and refer her to cardiology for further evaluation.  · Patient was seen by Dr. Bustos cardiologist on 4/6/2021.  We will continue to follow-up in 3 months.      PLAN:  1. Proceed with infusional 5-FU, leucovorin, Avastin today.  2. Continue antiemetics as needed at home for nausea.  3. Continue aggressive emollient moisturizers to hands and feet.  4. Continue  Lovenox at dose of 1 mg/kg twice daily.  5. Return in 2 weeks for follow-up visit with Dr. Nguyen for reevaluation prior to her next cycle of 5-FU, leucovorin, Avastin.      Call/return sooner should develop any new concerns or  problems.    Patient is on high risk medication requiring close monitoring for toxicity.      Sheba Matias, APRN  05/25/21      CC:  Deepika Vela III NP-MD Perla Rahman MD

## 2021-05-27 ENCOUNTER — INFUSION (OUTPATIENT)
Dept: ONCOLOGY | Facility: HOSPITAL | Age: 41
End: 2021-05-27

## 2021-05-27 DIAGNOSIS — Z45.2 ENCOUNTER FOR FITTING AND ADJUSTMENT OF VASCULAR CATHETER: Primary | ICD-10-CM

## 2021-05-27 PROCEDURE — 25010000002 HEPARIN LOCK FLUSH PER 10 UNITS: Performed by: INTERNAL MEDICINE

## 2021-05-27 RX ORDER — SODIUM CHLORIDE 0.9 % (FLUSH) 0.9 %
10 SYRINGE (ML) INJECTION AS NEEDED
Status: CANCELLED | OUTPATIENT
Start: 2021-05-27

## 2021-05-27 RX ORDER — HEPARIN SODIUM (PORCINE) LOCK FLUSH IV SOLN 100 UNIT/ML 100 UNIT/ML
500 SOLUTION INTRAVENOUS AS NEEDED
Status: CANCELLED | OUTPATIENT
Start: 2021-05-27

## 2021-05-27 RX ORDER — HEPARIN SODIUM (PORCINE) LOCK FLUSH IV SOLN 100 UNIT/ML 100 UNIT/ML
500 SOLUTION INTRAVENOUS AS NEEDED
Status: DISCONTINUED | OUTPATIENT
Start: 2021-05-27 | End: 2021-05-27 | Stop reason: HOSPADM

## 2021-05-27 RX ADMIN — Medication 500 UNITS: at 15:04

## 2021-06-08 ENCOUNTER — INFUSION (OUTPATIENT)
Dept: ONCOLOGY | Facility: HOSPITAL | Age: 41
End: 2021-06-08

## 2021-06-08 ENCOUNTER — OFFICE VISIT (OUTPATIENT)
Dept: ONCOLOGY | Facility: CLINIC | Age: 41
End: 2021-06-08

## 2021-06-08 VITALS
SYSTOLIC BLOOD PRESSURE: 128 MMHG | DIASTOLIC BLOOD PRESSURE: 88 MMHG | BODY MASS INDEX: 32.34 KG/M2 | TEMPERATURE: 97.8 F | WEIGHT: 194.1 LBS | RESPIRATION RATE: 18 BRPM | HEART RATE: 96 BPM | OXYGEN SATURATION: 96 % | HEIGHT: 65 IN

## 2021-06-08 DIAGNOSIS — G62.0 DRUG-INDUCED PERIPHERAL NEUROPATHY (HCC): ICD-10-CM

## 2021-06-08 DIAGNOSIS — E87.1 HYPONATREMIA: ICD-10-CM

## 2021-06-08 DIAGNOSIS — Z79.01 CHRONIC ANTICOAGULATION: Chronic | ICD-10-CM

## 2021-06-08 DIAGNOSIS — L27.1 HAND FOOT SYNDROME: ICD-10-CM

## 2021-06-08 DIAGNOSIS — D75.89 MACROCYTOSIS WITHOUT ANEMIA: ICD-10-CM

## 2021-06-08 DIAGNOSIS — C20 ADENOCARCINOMA OF RECTUM (HCC): Primary | Chronic | ICD-10-CM

## 2021-06-08 DIAGNOSIS — C20 ADENOCARCINOMA OF RECTUM (HCC): Primary | ICD-10-CM

## 2021-06-08 DIAGNOSIS — D68.51 HETEROZYGOUS FACTOR V LEIDEN MUTATION (HCC): Chronic | ICD-10-CM

## 2021-06-08 DIAGNOSIS — C20 ADENOCARCINOMA OF RECTUM (HCC): ICD-10-CM

## 2021-06-08 LAB
ALBUMIN SERPL-MCNC: 3.8 G/DL (ref 3.5–5.2)
ALBUMIN/GLOB SERPL: 1.2 G/DL (ref 1.1–2.4)
ALP SERPL-CCNC: 95 U/L (ref 38–116)
ALT SERPL W P-5'-P-CCNC: 48 U/L (ref 0–33)
ANION GAP SERPL CALCULATED.3IONS-SCNC: 9.2 MMOL/L (ref 5–15)
AST SERPL-CCNC: 27 U/L (ref 0–32)
BASOPHILS # BLD AUTO: 0.02 10*3/MM3 (ref 0–0.2)
BASOPHILS NFR BLD AUTO: 0.4 % (ref 0–1.5)
BILIRUB SERPL-MCNC: 0.2 MG/DL (ref 0.2–1.2)
BILIRUB UR QL STRIP: ABNORMAL
BUN SERPL-MCNC: 9 MG/DL (ref 6–20)
BUN/CREAT SERPL: 11.8 (ref 7.3–30)
CALCIUM SPEC-SCNC: 9.2 MG/DL (ref 8.5–10.2)
CHLORIDE SERPL-SCNC: 102 MMOL/L (ref 98–107)
CLARITY UR: ABNORMAL
CO2 SERPL-SCNC: 24.8 MMOL/L (ref 22–29)
COLOR UR: ABNORMAL
CREAT SERPL-MCNC: 0.76 MG/DL (ref 0.6–1.1)
DEPRECATED RDW RBC AUTO: 57.7 FL (ref 37–54)
EOSINOPHIL # BLD AUTO: 0.16 10*3/MM3 (ref 0–0.4)
EOSINOPHIL NFR BLD AUTO: 3.1 % (ref 0.3–6.2)
ERYTHROCYTE [DISTWIDTH] IN BLOOD BY AUTOMATED COUNT: 15.2 % (ref 12.3–15.4)
GFR SERPL CREATININE-BSD FRML MDRD: 84 ML/MIN/1.73
GLOBULIN UR ELPH-MCNC: 3.2 GM/DL (ref 1.8–3.5)
GLUCOSE SERPL-MCNC: 105 MG/DL (ref 74–124)
GLUCOSE UR STRIP-MCNC: NEGATIVE MG/DL
HCT VFR BLD AUTO: 43.2 % (ref 34–46.6)
HGB BLD-MCNC: 14.6 G/DL (ref 12–15.9)
HGB UR QL STRIP.AUTO: NEGATIVE
IMM GRANULOCYTES # BLD AUTO: 0.01 10*3/MM3 (ref 0–0.05)
IMM GRANULOCYTES NFR BLD AUTO: 0.2 % (ref 0–0.5)
KETONES UR QL STRIP: NEGATIVE
LEUKOCYTE ESTERASE UR QL STRIP.AUTO: ABNORMAL
LYMPHOCYTES # BLD AUTO: 1.07 10*3/MM3 (ref 0.7–3.1)
LYMPHOCYTES NFR BLD AUTO: 20.9 % (ref 19.6–45.3)
MCH RBC QN AUTO: 35 PG (ref 26.6–33)
MCHC RBC AUTO-ENTMCNC: 33.8 G/DL (ref 31.5–35.7)
MCV RBC AUTO: 103.6 FL (ref 79–97)
MONOCYTES # BLD AUTO: 0.4 10*3/MM3 (ref 0.1–0.9)
MONOCYTES NFR BLD AUTO: 7.8 % (ref 5–12)
NEUTROPHILS NFR BLD AUTO: 3.47 10*3/MM3 (ref 1.7–7)
NEUTROPHILS NFR BLD AUTO: 67.6 % (ref 42.7–76)
NITRITE UR QL STRIP: NEGATIVE
NRBC BLD AUTO-RTO: 0 /100 WBC (ref 0–0.2)
PH UR STRIP.AUTO: 6 [PH] (ref 4.5–8)
PLATELET # BLD AUTO: 173 10*3/MM3 (ref 140–450)
PMV BLD AUTO: 9.5 FL (ref 6–12)
POTASSIUM SERPL-SCNC: 4.1 MMOL/L (ref 3.5–4.7)
PROT SERPL-MCNC: 7 G/DL (ref 6.3–8)
PROT UR QL STRIP: ABNORMAL
RBC # BLD AUTO: 4.17 10*6/MM3 (ref 3.77–5.28)
SODIUM SERPL-SCNC: 136 MMOL/L (ref 134–145)
SP GR UR STRIP: >=1.03 (ref 1–1.03)
UROBILINOGEN UR QL STRIP: ABNORMAL
WBC # BLD AUTO: 5.13 10*3/MM3 (ref 3.4–10.8)

## 2021-06-08 PROCEDURE — 25010000002 FLUOROURACIL PER 500 MG: Performed by: INTERNAL MEDICINE

## 2021-06-08 PROCEDURE — 99214 OFFICE O/P EST MOD 30 MIN: CPT | Performed by: INTERNAL MEDICINE

## 2021-06-08 PROCEDURE — 25010000002 DEXAMETHASONE SODIUM PHOSPHATE 100 MG/10ML SOLUTION: Performed by: INTERNAL MEDICINE

## 2021-06-08 PROCEDURE — 85025 COMPLETE CBC W/AUTO DIFF WBC: CPT

## 2021-06-08 PROCEDURE — 81003 URINALYSIS AUTO W/O SCOPE: CPT

## 2021-06-08 PROCEDURE — 96416 CHEMO PROLONG INFUSE W/PUMP: CPT

## 2021-06-08 PROCEDURE — 25010000002 BEVACIZUMAB 100 MG/4ML SOLUTION 4 ML VIAL: Performed by: INTERNAL MEDICINE

## 2021-06-08 PROCEDURE — 25010000002 PALONOSETRON PER 25 MCG: Performed by: INTERNAL MEDICINE

## 2021-06-08 PROCEDURE — 25010000002 FOSAPREPITANT PER 1 MG: Performed by: INTERNAL MEDICINE

## 2021-06-08 PROCEDURE — 96413 CHEMO IV INFUSION 1 HR: CPT

## 2021-06-08 PROCEDURE — 80053 COMPREHEN METABOLIC PANEL: CPT

## 2021-06-08 PROCEDURE — 96367 TX/PROPH/DG ADDL SEQ IV INF: CPT

## 2021-06-08 PROCEDURE — 96375 TX/PRO/DX INJ NEW DRUG ADDON: CPT

## 2021-06-08 PROCEDURE — 25010000002 LEUCOVORIN CALCIUM PER 50 MG: Performed by: INTERNAL MEDICINE

## 2021-06-08 PROCEDURE — 25010000002 BEVACIZUMAB PER 10 MG: Performed by: INTERNAL MEDICINE

## 2021-06-08 RX ORDER — PALONOSETRON 0.05 MG/ML
0.25 INJECTION, SOLUTION INTRAVENOUS ONCE
Status: COMPLETED | OUTPATIENT
Start: 2021-06-08 | End: 2021-06-08

## 2021-06-08 RX ORDER — SODIUM CHLORIDE 9 MG/ML
250 INJECTION, SOLUTION INTRAVENOUS ONCE
Status: COMPLETED | OUTPATIENT
Start: 2021-06-08 | End: 2021-06-08

## 2021-06-08 RX ORDER — SODIUM CHLORIDE 9 MG/ML
250 INJECTION, SOLUTION INTRAVENOUS ONCE
Status: CANCELLED | OUTPATIENT
Start: 2021-06-08

## 2021-06-08 RX ORDER — PALONOSETRON 0.05 MG/ML
0.25 INJECTION, SOLUTION INTRAVENOUS ONCE
Status: CANCELLED | OUTPATIENT
Start: 2021-06-08

## 2021-06-08 RX ADMIN — SODIUM CHLORIDE 100 ML: 9 INJECTION, SOLUTION INTRAVENOUS at 12:12

## 2021-06-08 RX ADMIN — DEXAMETHASONE SODIUM PHOSPHATE 12 MG: 10 INJECTION, SOLUTION INTRAMUSCULAR; INTRAVENOUS at 12:45

## 2021-06-08 RX ADMIN — FLUOROURACIL 4850 MG: 50 INJECTION, SOLUTION INTRAVENOUS at 14:13

## 2021-06-08 RX ADMIN — BEVACIZUMAB 900 MG: 400 INJECTION, SOLUTION INTRAVENOUS at 13:07

## 2021-06-08 RX ADMIN — SODIUM CHLORIDE 250 ML: 9 INJECTION, SOLUTION INTRAVENOUS at 12:09

## 2021-06-08 RX ADMIN — PALONOSETRON 0.25 MG: 0.05 INJECTION, SOLUTION INTRAVENOUS at 12:09

## 2021-06-08 RX ADMIN — SODIUM CHLORIDE 810 MG: 0.9 INJECTION, SOLUTION INTRAVENOUS at 13:41

## 2021-06-08 NOTE — PROGRESS NOTES
REASON FOR FOLLOW UP:     1. Rectal cancer, rectal ultrasound examination in July 2019 reported T3N0 disease without lymphadenopathy. She does have small pulmonary nodule 6-7 mm and 2 mm with indeterminate feature. There is no solid evidence of metastatic disease otherwise. Patient has stage IIA (T3N0M0) disease.  2. The patient is heterozygous factor V Leiden, was on prophylactic anticoagulation with Lovenox 40 mg daily given her increased risk of thrombosis with MediPort and GI malignancy.   3. PET scan on 8/8/2019 reported an 8 mm hypermetabolic right upper lobe pulmonary nodule, which is suspicious for metastatic as well.    4. Patient had a PowerPort placement on the left upper chest by Dr. Joseph on 8/9/2019.  5. Patient was started on concurrent infusional 5-FU chemoradiation therapy on 8/12/2019, with planned complete surgical resection and further adjuvant chemotherapy with intention to cure the disease.   6. Patient underwent surgical resection of the primary rectal cancer by Dr. Ye on 12/2/2019.  Pathology evaluation reported residual T3N1 disease stage IIIb.  7. Diarrhea related to therapy and radiation.   8. Patient was started cycle 1 day 1 of adjuvant FOLFOX 6 on 1/23/2020.  9. On 2/5/2020 FOLFOX 6 cycle 2 delayed secondary to neutropenia.  Patient was given 3 days of Granix injection.  After cycle #2 we planned 3 days of Granix with each cycle.   10. Patient also intolerant of oral iron.  Patient received 2 doses of IV injectafer on 02/05/2020 and 02/12/2020.   11. 02/12/2020 Proceed with cycle #2 of FOLFOX 6 with 3 days of Granix.  12. On 3/11/2020 cycle 4 postponed secondary to abdominal pain and occasional low-grade fevers.  CT scans ordered.  13. Cycle #4 resumed after CT scan revealed no evidence of disease.  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 Ye. patient has no fever.  Continue to observe.   14. Grade 3  hand-foot syndrome secondary to 5-FU after cycle #7 FOLFOX 6 chemotherapy, prompting ER visit.  Also has worsening peripheral neuropathy.   15. Cycle #8 FOLFOX 6 was given on 5/13/2020, with 50% dose reduction for both 5-FU and oxaliplatin, and also examination of bolus 5-FU.   16. PET scan examination on 5/21/2020 reported no evidence of metastatic disease in the chest abdomen pelvis.  Stable 6 mm RUL pulmonary nodule.  17. On 5/27/2020, I discussed with the patient that we can discontinue chemotherapy at this time.   18. Patient had a surgical procedure for low anterior colon resection, coloanal anastomosis on 8/3/2020.  19. CT scan for chest abdomen pelvis on 9/9/2020 reported a new 8 mm noncalcified pulmonary nodule in the left lower lobe of the lung.  Otherwise stable right upper lobe 6 mm pulmonary nodule, and no evidence of disease recurrence in the abdomen or pelvis.  20. PET scan examination on 9/18/2020 reported multiple hypermetabolic small pulmonary nodules/ new pulmonary nodules.   21. Patient was started on pelvic chemotherapy with FOLFIRI regimen on 10/13/2020.   22. 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 It reported tumor mutation burden 5 Muts/Mb, microsatellite stable, TP53 mutation R282W, and others.   23. Cycle #5 was without irinotecan, due to peripheral neuropathy.  24. 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  Patient was switched to weight-based Lovenox injection.  25. Cycle #6 5-FU and irinotecan was delayed by 2 weeks because of the above incident.  26. Patient had a telemedicine evaluation at that the [facility] cancer [place] in February 2021.  They agreed with our treatment plan for palliative chemotherapy followed by maintenance chemotherapy.       HISTORY OF PRESENT ILLNESS:  The patient is a 40 y.o. female with the above-mentioned  history who is here today for lab review and evaluation prior to cycle #5 maintenance chemotherapy with 5-FU, leucovorin, and Avastin.      Patient reports she has been tolerating much better with infusional 5-FU compared to Xeloda based maintenance chemotherapy.  Patient did have a brief trial of Xeloda, but quickly developed significant hand-foot syndrome thus it was discontinued and switched back to infusional 5-FU.      She continues to have hyperpigmentation of her hands bilaterally although it has significantly improved.  She is aggressive with hydration of her hands and feet.    Patient otherwise tolerating treatment, denies nausea vomiting.  No abdominal pain.  No melena or hematochezia.  Colostomy functions properly.  She continues on lovenox and denies bleeding issues.  She does continue to have bruising and nodules on her abdomen from Lovenox injections.      Laboratory study today on 6/8/2021 reported normal CBC except a mild macrocytosis .6.  Chemistry lab reported unremarkable CMP except marginally elevated ALT 48 otherwise normal liver function panel.    We will proceed ahead with cycle #5 maintenance chemotherapy today.    Past Medical History:   Diagnosis Date   • Abdominopelvic abscess (CMS/HCC) 08/12/2020    ADMITTED TO [facility]   • Abnormal Pap smear of cervix 02/02/1998    JULIUS I   • Anemia in pregnancy    • Anxiety    • Bilateral epiphora 04/2020   • Bilateral hand swelling 05/02/2020    SEEN AT [facility] ER   • Cervical lymphadenitis 06/06/2012    SEEN AT [facility] ER   • Chemotherapy induced neutropenia (CMS/HCC) 04/2020   • Chemotherapy-induced nausea 02/2020   • Chemotherapy-induced thrombocytopenia 05/2020   • Chronic diarrhea    • Colon polyps     FOLLOWED BY DR. GERONIMO ESPARZA   • Cystitis 04/24/2020    WITH DEHYDRATION, ADMITTED TO [facility]   • Cystitis 10/27/2020   • Drug-induced peripheral neuropathy (CMS/HCC) 05/2020   • Fistula of intestine    • GERD (gastroesophageal reflux disease)    • Hand foot  syndrome 2020   • Heart murmur     IN CHILDHOOD   • Hematochezia    • Hemorrhoids    • Heterozygous factor V Leiden mutation (CMS/HCC)     DX 2019   • History of anemia    • History of chemotherapy     FOLLOWED BY DR. ALEXANDRU HUNT   • History of gestational diabetes    • History of pre-eclampsia    • History of radiation therapy     FOLLOWED BY DR. JAVON LEWIS   • History of TB skin testing 2009    TB Skin Test   • HPV in female    • Hypokalemia 2019   • Hypomagnesemia 2019   • IBS (irritable bowel syndrome)    • Ileostomy in place (CMS/[facility])     FOLLOWED BY DR. GERONIMO YE   • Infected insect bite of neck 2016    SEEN AT [facility]   • Kidney stone    • Kidney stones 2007    SEEN AT [facility] ER   • Lump of right breast     SWOLLEN LYMPH NODE   • Lung cancer (CMS/HCC) 2020    METASTATIC LUNG CANCER   • Monopolar depression (CMS/[facility])    • On anticoagulant therapy    • Perirectal abscess 2020   • PIH (pregnancy induced hypertension)    • Pulmonary embolism without acute cor pulmonale (CMS/HCC) 09/20/2019    X 3; ADMITTED TO [facility]   • Pulmonary nodule, right 2020   • Rectal cancer (CMS/HCC) 2019    STAGE IIA, INVASIVE MODERATELY DIFFERENTIATED ADENOCARCINOMA, CHEMO AND XRT FINISHED 2019   • Right shoulder pain 2020    SEEN AT [facility] ER   • Right ureteral stone 10/01/2019    SEEN AT [facility] ER   • SAB (spontaneous ) 1996     A2-1 INDUCED   • Sciatica    • Sepsis due to cellulitis (CMS/HCC) 2002    LEFT GREAT TOE, ADMITTED TO [facility]   • Tachycardia 2020   • Urinary urgency 2020     Past Surgical History:   Procedure Laterality Date   • CHOLECYSTECTOMY N/A 10/10/2003    LAPAROSCOPIC WITH CHOLANGIOGRAM, DR. JAMEY TALAVERA AT [facility]   • COLON RESECTION N/A 2019    Procedure: laparoscopic low anterior colon resection with mobilization of splenic flexure and diverting loop ileostomy: ERAS;  Surgeon: Geronimo Ye MD;   Location: [facility] MAIN OR;  Service: General   • COLON RESECTION N/A 8/3/2020    Procedure: LOW ANTERIOR COLON RESECTION, COLOANAL ANASTOMOSIS, MOBILIZATION SPLENIC FLEXURE;  Surgeon: Geronimo Ye MD;  Location: [facility] MAIN OR;  Service: General;  Laterality: N/A;   • COLONOSCOPY N/A 7/15/2020    PATENT ANASTAMOSIS IN MID RECTUM, RESCOPE IN 1 YR, DR. GERONIMO YE AT [facility]   • COLONOSCOPY W/ POLYPECTOMY N/A 07/08/2019    15 MM TUBULOVILLOUS ADENOMA POLYP IN HEPATIC FLEXURE, 20 MMTUBULOVILLOUS ADENOMA WITH HIGH GRADE DYSPLASIA IN RECTOSIGMOID, 4 CM MASS IN MID RECTUM, PATH: INVASIVE MODERATELY DIFFERENTIATED ADENOCARCINOMA, DR. JENNIFER LI AT [facility]   • DILATATION AND EVACUATION N/A 2009   • ENDOSCOPY N/A 07/08/2019    LA GRADE A ESOPHAGITIS, GASTRITIS, ALL BIOPSIES BENIGN, DR. JENNIFER LI AT [facility]   • INCISION AND DRAINAGE PERIRECTAL ABSCESS N/A 8/14/2020    Procedure: INCISION AND DRAINAGE OF retrorectal dehiscence abcess with drain placement and irrigation;  Surgeon: Geronimo Ye MD;  Location: [facility] OR;  Service: General;  Laterality: N/A;   • PAP SMEAR N/A 01/24/2014   • SIGMOIDOSCOPY N/A 7/24/2019    INFILTRATIVE PARTIALLY OBSTRUCTING LARGE RECTAL CANCER, AREA TATOOED, DR. GERONIMO YE AT [facility]   • SIGMOIDOSCOPY N/A 11/23/2019    AN ULCERATED PARTIALLY OBSTRUCTING MASS IN MID RECTUM, AREA TATTOOED, DR. GERONIMO YE AT [facility]   • TRANSRECTAL ULTRASOUND N/A 7/24/2019    Procedure: ULTRASOUND TRANSRECTAL;  Surgeon: Geronimo Ye MD;  Location: [facility] ENDOSCOPY;  Service: General   • URETEROSCOPY LASER LITHOTRIPSY WITH STENT INSERTION Right 10/30/2019    DR. ESTUARDO BEASLEY AT [place]   • VAGINAL DELIVERY N/A 09/18/1998   • VENOUS ACCESS DEVICE (PORT) INSERTION Left 8/9/2019    Procedure: INSERTION VENOUS ACCESS DEVICE;  Surgeon: Sj Joseph MD;  Location: [facility] OR [facility];  Service: General   • VENOUS ACCESS DEVICE (PORT) INSERTION N/A 12/18/2020    Procedure: INSERTION  VENOUS ACCESS DEVICE right side, removal venous access device left side;  Surgeon: Sj Joseph MD;  [facility] [place] [facility];  Service: General;  Laterality: N/A;   • WISDOM TOOTH EXTRACTION Bilateral 1993       HEMATOLOGIC/ONCOLOGIC HISTORY:      The patient reports she has intermittent rectal bleeding and urgency, mucous with her stool, starting sometime in 2016. At that time she was referred to GI service, and was diagnosed of irritable bowel syndrome. Nevertheless she had worsening urgency for bowel movement, and had a couple of incidents losing control of stool. She was recently seen by Roland Thorpe MD again and had colonoscopy and EGD exam on 07/08/2019. She was found having a circumferential rectal mass. According to the procedure note, the patient had a fungating circumferential bleeding 4 cm mass in the middle rectum, at distance between 13 cm and 17 cm from the anus. Mass was causing partial obstruction. There were also colon polyps found at the hepatic flexure and also at the rectosigmoid junction 23 cm from the anus. Both were resected and retrieved. EGD examination reported grade A esophagitis at the GE junction and patchy discontinuous erythema and aggravation of the mucosa without bleeding in the stomach body. There is normal mucosa of the duodenum. Pathology evaluation from this procedure reported moderately differentiated adenocarcinoma involving the rectal mass. The rectal sigmoid junction polyp was tubular/tubulovillous adenoma with high grade dysplasia negative for invasive malignancy. The hepatic flexure polyp was also tubular/tubulovillous adenoma negative for high grade dysplasia or malignancy. The biopsy from the esophagus reports squamocolumnar mucosa with inflammatory changes suggestive of mild reflux esophagitis, negative for interstitial metaplasia. Gastric biopsy was benign and duodenal biopsy was also benign. There is no mention of H-pylori.     Patient was subsequently referred to  colorectal surgeon Padmaja Ye MD for further evaluation. The patient had CT scan examination for chest, abdomen and pelvis requested by Dr. Ye and were done on 07/13/2019. The study reported no evidence of lymphadenopathy in the abdomen and pelvis. There was wall thickening of the rectosigmoid junction. Normal spleen, pancreas, adrenal glands and kidneys. There was fatty liver infiltration but no focal lymphatic lesions. There was a small 6-7 mm noncalcified nodule in the right upper lobe and a tiny 3 mm subpleural nodule in the right middle lobe. No mediastinal or hilar lymphadenopathy.     Dr. Ye performed staging rectal ultrasound examination. This study reported tumor penetrating through the muscularis propria as T3 disease; there were no lymph nodes identified.    She had a hospitalization in late September 2019 because of newly discovered pulmonary emboli.  She was on prophylactic Lovenox prior to the incident of PE.  Now she is on full dose Xarelto anticoagulation.  Patient reports no further chest pain dyspnea.  She denies bleeding or bruising.  During her hospitalization, she was seen by Dr. Ye, who plans to have surgery 8 to 12 weeks after finishing radiation therapy.  She finished her radiation on 9/19/2019.     I noticed patient also presented to the emergency room on 10/1/2019 complaining of right flank area pain.  I reviewed the images studies and indeed she has a very small 1 to 2 mm obstructing kidney stone in the UV junction.  Patient is still symptomatic with some pains and dysuria.  She denies fever sweating or chills.    Laboratory study on 10/7/2019 reported normal CBC including hemoglobin 13.1, platelets 301,000, WBC 6170 and ANC 4900 lymphocytes 590 monocytes 480.      The nurse reported malfunction of port-a-catheter on 10/7/2019.  Port study on 10/8/2019 showed fibrin sheath around the distal aspect of the Mediport catheter in the SVC. This does not appear to hinder injection,  but does prevent aspiration at this time.    Patient underwent surgical resection of the colon on 12/2/2019 with Dr. Ye.  Pathology evaluation reported residual T3 disease, also 1 out of 14 lymph nodes positive for malignancy.  So this patient in either had at least stage IIIb disease (T3 N1 M0?).      Adjuvant chemotherapy FOLFOX 6 will be started on 1/22/2020.  Laboratory study reported iron saturation 10%, free iron 31 TIBC 319 and ferritin 168.  Her hemoglobin was 11.8, WBC 5600, and platelets 347,000.    Patient was here on 02/12/2020 for cycle 2 of her FOLFOX.  This is delayed x1 week secondary to neutropenia.  The patient ultimately received 3 days worth of Neupogen with recovery of her blood counts.  Of note, the patient struggled with significant nausea without any episodes of vomiting following cycle #1 of chemotherapy resulting in significant weight loss.  She is up 12 pounds over the last week as her appetite has normalized.  We will add Emend to her care plan.    She is having several loose stools per her colostomy and has been hesitant to take Imodium due to prior history of constipation.  I encouraged her to try this with a maximum of 8 tablets/day.  She denies any infectious symptoms including fevers or chills.  No excess bleeding or bruising noted.  She had the expected cold sensitivity related to the Oxaliplatin for about 3 days following treatment.    Labs from 02/12/2020 demonstrated total white blood cell count of 5.14, ANC of 3.06, hemoglobin of 11.2, platelets of 211,000.  She was found to be iron deficient last week and is intolerant to oral iron secondary to GI distress.  For this reason, she initiated IV iron therapy with Injectafer last week.  She had received her second dose 02/12/2020    Patient has normalized hemoglobin 12.2 on 2/26/2020.    She reported on 5/5/2020 she had a recent visit to the emergency department for what was suspected to be an allergic reaction.  She called our  on-call service on Saturday with reports of hand and face swelling.  She was instructed to proceed to the emergency department at which time she was assessed and prescribed a Medrol Dosepak.  She had just completed her 5-FU and was unhooked on Friday, 5/3/2020.  Her symptoms have improved.  She does report persistent hyperpigmentation and mild swelling of the palms of the hands but this is much improved.  It was her right hand specifically that was swollen.  Her facial swelling has resolved.  She continues on Cefdinir nd since has 1 day remaining, she was prescribed cefdinir for a UTI requiring hospitalization at the end of April.  Reports no new symptoms.  Her labs are stable.  She is scheduled for treatment again.    Patient states at this time she is not tolerating her chemotherapy well.     Patient seen in the emergency department on 5/2/2020 for what was suspected to be an allergic reaction.  She called our on-call service on Saturday explaining that she was experiencing hand and face swelling.  She was instructed to proceed to the emergency department at which time she was assessed and prescribed a Medrol Dosepak.  She had just completed her 5-FU and was unhooked on Friday, 5/1/2020.      She reports since her ED visit on 5/2/2020 her symptoms have not improved. Her hands and feet were swollen upon presenting to the ED and she could barely make a fist. She states that she feels so swollen she is not able to stand it. She states that her skin on the hands and feet are peeling extensively as well, besides changing color to more dark.     She also reports significant fatigue after her first week of the 5-FU treatment but she expected this side effect. She also notices significant increase in her neuropathy to the point that she is not able to even walk around in her home without her house slippers due to irritation from her rugs. She denies and associated nausea or vomiting at this time. She also has episodes of  epistaxis every day after the chemotherapy cycle #7.  She does report working full-time during the week of chemotherapy.    Laboratory studies, 5/13/2020, show moderate thrombocytopenia platelets 123,000, low normal WBC 4140 including ANC 2720 and normal hemoglobin 13.4.  Because significant hand-foot syndrome, will decrease both 5-FU and oxaliplatin by 50%, and eliminate bolus dose of 5-FU.    On 5/21/2020 patient had a PET scan performed which showed no convincing evidence of residual disease in abdomen or pelvis, no metastatic disease within the chest or neck.     Cycle #8 FOLFOX 6 was given on 5/13/2020, with 50% dose reduction for both 5-FU and oxaliplatin, and also examination of bolus 5-FU.  She states on 5/27/2020 that with the recent reduction of the chemotherapy she feels significantly better. She has more energy and is able to do her daily routine.      PET scan examination on 5/21/2020 reported no evidence of metastatic disease in chest abdomen pelvis.  There was a stable 6 mm right upper lobe pulmonary nodule.    Laboratory studies on 5/27/2020 showed mild leukocytopenia WBC 3720 but a normal ANC 2250 and lymphocytes 630.  Normal platelets 163,000 and hemoglobin 12.6.  Chemistry lab reported normal renal function, liver function panel and a electrolytes, elevated glucose 164.    Laboratory studies 6/24/2020, showed normal hemoglobin 13.4 but macrocytosis .9.  Normal platelets 210,000 and WBC 5870.  She had normal CMP.     Patient last time was here in late June 2020.  Since that time she had surgical procedure for low anterior colon resection, coloanal anastomosis on 8/3/2020.  She later developed a perirectal abscess, required surgical drainage on 8/14/2020.  She was discharged on 8/27/2020.    Patient reports to me she still has lower abdominal wall vacuum suction in place.  She denies fever sweating chills.  Performance status is ECOG 1.  She continues to walk with part-time in office, and  part-time at home.  She does have visiting nurse come to the home for wound care.    CT scan for chest abdomen pelvis on 9/9/2020 reported a new 8 mm noncalcified pulmonary nodule in the left lower lobe.  Otherwise stable right upper lobe 6 mm pulmonary nodule, and no evidence of disease recurrence in the abdomen or pelvis.     Laboratory study on 9/16/2020 reported elevated AST 55, ALT 95, alk phosphatase 143 but normal total bilirubin 0.3.  Chemistry lab otherwise unremarkable.  She has completed normal CBC.      Because of the abnormal CT scan examination for chest abdomen pelvis on 9/9/2020 reported a new 8 mm noncalcified pulmonary nodule in the left lower lobe, we obtained a PET scan examination for further evaluation, which was done on 9/18/2020.  This study reported several pulmonary nodules, some of them are hypermetabolic, newly developed compared to previous PET scan in May 2020.  Certainly does highly suspicious for metastatic disease.  There are also hypermetabolic activity in the abdomen and pelvic area which is hard to differentiate from surgical scar versus metastatic malignancy.    Laboratory study on 10/13/2020 reported normal CBC with Hb 13.9, platelets 302,000 and WBC 5520 including ANC 3830.  Chemistry lab reported normalized AST 20, alk phosphatase 116 improved ALT 35, and maintains normal bilirubin, with normal electrolytes and liver function panel.     Patient was started on first cycle of palliative chemotherapy FOLFIRI on 10/13/2020.    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  Patient had a new port placed 12/18/2020, and had held her Xarelto for 2 days prior to surgery.  She presented to the ER on 12/21/20 with complaints of facial and arm swelling for 3 days.  She also noted increased shortness of breath.  She was found to  have a thrombus of the SVC and left brachiocephalic vein.  Thrombus within the right internal jugular vein cannot be excluded.  Patient was started on IV heparin, and eventually transitioned to weight-based Lovenox, which she now continues.      MEDICATIONS    Current Outpatient Medications:   •  clonazePAM (KlonoPIN) 1 MG tablet, TAKE 1 TABLET BY MOUTH THREE TIMES A DAY AS NEEDED FOR ANXIETY OR SEIZURES, Disp: 90 tablet, Rfl: 0  •  dicyclomine (BENTYL) 20 MG tablet, TAKE 1 TABLET BY MOUTH EVERY 6 HOURS AS NEEDED, Disp: 360 tablet, Rfl: 0  •  diphenoxylate-atropine (LOMOTIL) 2.5-0.025 MG per tablet, TAKE 1 TABLET BY MOUTH 4 (FOUR) TIMES A DAY AS NEEDED FOR DIARRHEA., Disp: 120 tablet, Rfl: 1  •  enoxaparin (Lovenox) 100 MG/ML solution syringe, Inject 1 mL under the skin into the appropriate area as directed Every 12 (Twelve) Hours., Disp: 60 mL, Rfl: 2  •  escitalopram (LEXAPRO) 10 MG tablet, TAKE 1 TABLET BY MOUTH EVERY DAY, Disp: 30 tablet, Rfl: 5  •  famotidine (PEPCID) 20 MG tablet, TAKE 1 TABLET BY MOUTH TWICE A DAY, Disp: 180 tablet, Rfl: 1  •  Loratadine (CLARITIN) 10 MG capsule, Take 10 mg by mouth Every Evening., Disp: , Rfl:   •  mineral oil-hydrophilic petrolatum (AQUAPHOR) ointment, Apply 1 application topically to the appropriate area as directed As Needed for Dry Skin., Disp: , Rfl:   •  ondansetron (ZOFRAN) 8 MG tablet, Take 1 tablet by mouth 3 (Three) Times a Day As Needed for Nausea or Vomiting., Disp: 60 tablet, Rfl: 5  •  prochlorperazine (COMPAZINE) 10 MG tablet, Take 1 tablet by mouth Every 6 (Six) Hours As Needed for Nausea or Vomiting., Disp: 30 tablet, Rfl: 2  •  promethazine (PHENERGAN) 25 MG tablet, Take 1 tablet by mouth Every 6 (Six) Hours As Needed for Nausea or Vomiting., Disp: 60 tablet, Rfl: 2    ALLERGIES:   No Known Allergies    SOCIAL HISTORY:       Social History     Tobacco Use   • Smoking status: Current Every Day Smoker     Packs/day: 0.50     Years: 24.00     Pack years: 12.00  "    Types: Electronic Cigarette, Cigarettes   • Smokeless tobacco: Never Used   Vaping Use   • Vaping Use: Some days   • Substances: Nicotine   • Devices: Disposable   Substance Use Topics   • Alcohol use: Not Currently     Comment: Caffeine use rare   • Drug use: No         FAMILY HISTORY:   Mother has positive factor V Leiden mutation, although she did not have thrombosis, mother also is coronary disease with stenting, she also is occasional bruising.  Maternal grandmother had DVT, she had multiple surgical procedures.  Patient mother's half-brother had metastatic colon cancer at diagnosis in his 50s.  Maternal great aunt has breast cancer.  Patient will follow his skin cancer in his 60s, exclusive.    Review of Systems   Constitutional: Negative for appetite change, fever and unexpected weight change.   HENT: Negative for mouth sores and sore throat.    Eyes: Negative for visual disturbance.   Respiratory: Negative for cough and shortness of breath.    Cardiovascular: Negative for chest pain and leg swelling.   Gastrointestinal: Negative for abdominal pain, blood in stool and nausea.   Endocrine: Negative for cold intolerance.   Genitourinary: Negative for dysuria and hematuria.   Musculoskeletal: Negative for arthralgias and joint swelling.   Skin: Positive for color change (Hand-foot syndrome, improved). Negative for rash.   Allergic/Immunologic: Immunocompromised state: Chemotherapy.   Neurological: Negative for dizziness and light-headedness.   Hematological: Negative for adenopathy. Bruises/bleeds easily (Easy bruising at the site of injection of Lovenox ).   Psychiatric/Behavioral: Negative for agitation and confusion.              Vitals:    06/08/21 1134   BP: 128/88   Pulse: 96   Resp: 18   Temp: 97.8 °F (36.6 °C)   TempSrc: Temporal   SpO2: 96%   Weight: 88 kg (194 lb 1.6 oz)   Height: 166 cm (65.35\")   PainSc: 0-No pain     Current Status 6/8/2021   ECOG score 0     Physical Exam  Vitals reviewed. "   Constitutional:       General: She is not in acute distress.     Appearance: Normal appearance. She is well-developed. She is not ill-appearing.   HENT:      Head: Normocephalic and atraumatic.   Eyes:      General: No scleral icterus.     Conjunctiva/sclera: Conjunctivae normal.   Cardiovascular:      Rate and Rhythm: Normal rate and regular rhythm.      Heart sounds: Normal heart sounds.   Pulmonary:      Effort: Pulmonary effort is normal. No respiratory distress.      Breath sounds: Normal breath sounds. No wheezing.   Abdominal:      General: Bowel sounds are normal. There is no distension.      Palpations: Abdomen is soft. There is no mass.      Comments: Ostomy in place with liquid brown stool.  Scattered bruises and noduled on the abdomen bilaterally from Lovenox injections.   Musculoskeletal:      Cervical back: Neck supple.      Right lower leg: No edema.      Left lower leg: No edema.   Lymphadenopathy:      Cervical: No cervical adenopathy.   Skin:     General: Skin is warm and dry.      Coloration: Skin is not jaundiced.      Findings: No rash.      Comments: Much improved hyperpigmentation of the palms and feet, with dryness.      Neurological:      Mental Status: She is alert and oriented to person, place, and time. Mental status is at baseline.      Cranial Nerves: No cranial nerve deficit.   Psychiatric:         Mood and Affect: Mood normal.         Thought Content: Thought content normal.     06/08/2021 physical exam is unchanged from above except as updated.    RECENT LABS:  Results from last 7 days   Lab Units 06/08/21  1115   WBC 10*3/mm3 5.13   NEUTROS ABS 10*3/mm3 3.47   HEMOGLOBIN g/dL 14.6   HEMATOCRIT % 43.2   PLATELETS 10*3/mm3 173     Lab Results   Component Value Date    GLUCOSE 105 06/08/2021    BUN 9 06/08/2021    CREATININE 0.76 06/08/2021    EGFRIFNONA 84 06/08/2021    BCR 11.8 06/08/2021    K 4.1 06/08/2021    CO2 24.8 06/08/2021    CALCIUM 9.2 06/08/2021    ALBUMIN 3.80  06/08/2021    AST 27 06/08/2021    ALT 48 (H) 06/08/2021                 Assessment/Plan      ASSESSMENT:   1.  Rectal cancer, rectal ultrasound examination reported T3N0 disease without lymphadenopathy.   · CT scan of chest, abdomen and pelvis reported no lymphadenopathy in the abdomen, pelvis or chest. She does have small pulmonary nodule 6-7 mm and 2 mm with indeterminate feature. There is no solid evidence of metastatic disease otherwise.   · Based on the CT scan and rectal ultrasound imaging studies, this patient had stage IIA (T3N0M0) disease.    · She had PET scan examination on 8/8/2019 which reported a hypermetabolic right upper lobe pulmonary nodule 6 mm with SUV 5.6.  This is suspicious for metastatic disease however it is too small to be biopsied.  This patient may have stage IV disease.   · She initiated concurrent radiation with continuous 5-FU on 8/12/2019.  · Patient finished concurrent chemoradiation therapy.  · Patient underwent surgical resection of the rectal tumor and diverting loop ileostomy on 12/2/2019 with Dr. Ye.  Pathology evaluation reported residual T3 disease, with 1 out of 14 lymph nodes positive for malignancy.  Certainly this patient has at least stage IIIb rectal cancer (T3 N1 M0?)  · On 1/22/2020 adjuvant chemotherapy FOLFOX 6 regimen initiated.    · On 2/5/2022 cycle #2 was delayed secondary to neutropenia.  She was given 3 days of Granix.   · Emend added with cycle 3.  With improved nausea control  · Continuing home Granix x3 days following 5-FU disconnect  · 3/11/2020 due for cycle 4, however, she is experiencing progressive abdominal pain and occasional fevers.   · CT scan performed on 3/13/2020 reveals no evidence of progressive or recurrent disease.  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  · Patient hospitalized 4/24-4/26/20 after cycle 5 of chemotherapy with acute UTI.  CT abdomen/pelvis noting fluid collection in the presacral region having diminished in size compared  to CT dated 3/13/2020.  Patient was evaluated by Dr. Ye while in the hospital with further plans to evaluate possible colovaginal fistula following completion of chemotherapy.  Patient did respond to IV antibiotics and discharged home on oral cefdinir.  · 4/29/2020 cycle 6 of FOLFOX.  Urinary symptoms have resolved   · Patient seen in the [facility] ED on 5/2/2020 for what was suspected to be an allergic reaction.  She called our service on Saturday explaining that she was experiencing hand and face swelling.  She was instructed to proceed to the emergency department at which time she was assessed and prescribed a Medrol Dosepak.  She had just completed her 5-FU and was unhooked on Friday, 5/1/2020.  Her symptoms have resolved in the office on assessment, 5/5/2020.  · The patient had grade 3 hand-foot syndrome based on symptomology.  Patient had cycle #8 of 5-FU on 5/13/2020. Due to her symptoms and poor tolerance to the 5-FU, her treatment dose will be reduced 50% for oxaliplatin and infusional 5-FU.  Bolus 5-FU will be discontinued..  · On 5/13/2020  We discussed further treatment options pending the scan results.  If she has indeed residual disease or metastatic disease, we will switch her to irinotecan plus Avastin or anti-EGFR monoclonal antibody.  She will need a further molecular testing of her tumor samples in that case.  · On 5/21/2020 patient had a PET scan and it showed no evidence of of metastatic disease in the neck, chest, abdomen or pelvis.  There was fluid accumulation/abscess.   · On 5/27/2020 I discussed with the patient that we can discontinue chemotherapy at this time.  She will follow-up with Dr. Ye to discuss a possibility to reverse the ileostomy.  We likely will obtain anther PET scan in 3 to 4 months for reassessment.    · Patient was seen by Dr. Ye on 6/19/2019 for evaluation and to discuss possible take down of her ileostomy.  She is scheduled to have a gastrografin enema on  7/2/2020 to evaluate for a fistula, and then a colonoscopy on 7/15/2020, both done by Dr. Ye.  She states that based on the above imaging and procedure findings, she may have a more extensive surgery done or just have her ileostomy reversed, which would likely be done in August 2020.  This will all be coordinated under the care of Dr. Ye.     · I reviewed scan results with the patient on 9/16/2020 for the most recent CT scan from 09/09/2020 and also compared to her previous PET scan examination from 05/21/2020 and also the original PET scan from 08/09/2019.  The original PET scan there is a small right upper lobe pulmonary nodule 6 mm with SUV 5.6. So in the 05/2020 PET scan the nodule is still there but activity seems much less and this most recent CT scan examination also documented the preexisting small pulmonary nodule however there is a new left lower lobe pulmonary nodule 8 mm in size and I shared with the patient today this nodule is suspicious for metastatic disease. Laboratory studies reported minimal CEA level 1.32 on 09/09/2020. Liver function panel was only marginally abnormal with the ALT 45, the remaining is normal. However laboratory study today showed worsening AST 55, ALT 95 and alkaline phosphatase 143 but is still normal, total bilirubin 0.3.   · So I discussed with the patient on 9/16/2020 recommended to have repeat PET scan examination for assessment because the left lower lobe pulmonary nodule is too small to be biopsied, and if the PET scan reports hypermetabolic lesion, I recommended to have stereotactic radiation therapy. Explained to patient that she is not a really good candidate to have thoracotomy for resection because she still has unhealed wound in the abdomen. I think the stereotactic radiation therapy will be a more feasible choice.  · Laboratory study on 9/16/2020 reported worsening liver function panel with both elevated AST, ALT and also slightly worsened alkaline  phosphatase despite having normal total bilirubin. I recommended to repeat laboratory study for reevaluation. The only new medication she has in the past several days is Augmentin but otherwise no change of condition. She denies any recent viral infection. We will monitor this.   · PET scan examination obtained on 9/18/2020 showed metastatic disease.  It reported a few hypermetabolic pulmonary nodules, and some new small pulmonary nodules, all of them highly suspicious for metastatic disease.  She also has hypermetabolic activity in the abdomen and pelvic area which could be related to scar tissue from her recent surgery versus metastatic disease.  Nevertheless overall picture fits with metastatic cancer.  · Discussed with the patient on 9/20/2020, we reviewed the PET scan images together, and I recommended to have systematic chemotherapy, I do not think stereotactic reading therapy to the hypermetabolic lesions in the lungs warranted at this time because even those will be treated with reading therapy, she will still need systematic chemotherapy.  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  I did discuss with the patient using anti-EGFR monoclonal antibody versus anti-VEGF monoclonal antibody.     · Palliative chemotherapy cycle#1 FOLFIRI was started on 10/13/2020.  No bolus 5-FU given considering previous poor tolerance.    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  Microsatellite stable, mutation burden 5/Mb.    · Discussed with the patient 10/27/2020, because of the K-s[place]ia mutation, she is not a candidate for anti-EGFR monoclonal antibody such as Erbitux or vectibix.  She could be a candidate for anti-VEGF monoclonal antibody, however because of active wound, she is not a candidate right now at this moment.  · Patient seen in the ED for acute right neck pain on 11/16/2020.  CT angiogram as well as CT of the cervical spine performed with no acute  findings but notably one of her pulmonary nodules, left upper lobe, somewhat decreased in size.  Patient given prednisone pack.  Further details below.  · Patient due today for cycle #4 FOLFIRI.  Plan repeat PET scan after cycle 6.  · Last received cycle #5 chemotherapy without irinotecan on 12/8/2020.   · Patient here 12/29/2020 for evaluation and consideration of cycle 6, but she continues to complain of swelling.  She does have swelling typically after treatment as well.  We will hold treatment for 1 week and reevaluate next week.  Still planning on PET scan following cycle 6.   · Cycle #6 chemotherapy will be resumed on 1/5/2021.  Started back on original irinotecan.  · PET scan examination obtained on 1/12/2021 after cycle #6 chemotherapy reported improved pulmonary nodule hypermetabolic activity.  Confirmed these are metastatic lesions.  · Patient is evaluated 1/19/2020, will continue cycle #7 FOLFIRI chemotherapy.  However Avastin will be on hold see next section.   · Patient was reevaluated on 2/2/2021, will continue chemotherapy cycle #8 FOLFIRI.  Avastin / biosimilar will be added.    · She talked to [facility] specialist in mid February 2021, and had recommended palliative chemotherapy without surgical intervention of metastatic lung lesions.   · 2/16/2021cycle #9 FOLFIRI plus bevacizumab.  Tolerated last cycle well with only some mild increase in liquid stools.  This was easily controlled with antidiarrheals.  · On 3/2/2021, patient will proceed with cycle #10 FOLFIRI plus bevacizumab.  After 12 cycles, plan to start maintenance treatment.  · 3/16/2021 cycle 11.  Plus bevacizumab.  · 3/30/2021 cycle 12 FOLFIRI plus bevacizumab.  Having issues with worsening nausea despite premeds with dexamethasone, Aloxi, Emend, and Zofran at home.  Patient is requesting a dose of Phenergan, which is helped her in the past.  We will give this to her with treatment today.  · PET scan  examination on 4/6/2021 reported no evidence of hypermetabolic metastatic lesion.  · Discussed with the patient on 4/13/2021, we reviewed the PET scan results.  We recommended to switch to maintenance chemotherapy without irinotecan.  We will continue 5-FU and Avastin every 2 weeks.  We discussed possibility of switching to oral Xeloda treatment.  Discussed with the patient for side effects more so with hand-foot syndrome.  Patient is agreeable.   · Xeloda 2000 mg twice daily 7 days on, 7 days off along with Avastin initiated 4/27/2021.  · After only 5 doses of Xeloda significant hand-foot syndrome, Xeloda held.  Dose adjustment versus transition to 5-FU will be further discussed with Dr. Nguyen  · Patient returns 5/11/2021 for reevaluation.  She did take her first dose of Xeloda 1500 mg this morning, but is actually requesting to be transition back to infusional 5-FU, as she felt that she tolerated infusional 5-FU without any problem.  The patient will receive 5-FU leucovorin and Avastin every 2 weeks.  Xeloda will be discontinued.  · 5/25/2021 continued cycle #4 maintenance 5-FU, leucovorin, Avastin tolerating this much better so far, we will continue this.  · On 6/8/2021, will proceed ahead with cycle #5 maintenance chemotherapy with infusional 5-FU, leucovorin and Avastin.    2 .  Pulmonary nodule, hypermetabolic on PET scan.   · Her original PET scan examination from 8/8/2019 reported a small 8 mm right upper apex pulmonary nodule but hypermetabolic SUV 5.1.  That was too small to be biopsied, however there was always a possibility of metastatic disease considering that high activity despite a such a small nodule.  · Her chest CT scan examination from 3/13/2020 reported this shrank to 6 mm.    · PET scan examination on 5/21/2020 reported no metabolic activity at this residual nodule.  This needs to be monitored.    · PET scan examination 9/18/2020 reported couple of hypermetabolic pulmonary nodules, besides a  few extra small pulmonary nodules.  Those are highly suspicious for metastatic disease.    · PET scan examination obtained on 1/12/2021 after cycle #6 chemotherapy reported improved a pulmonary nodule hypermetabolic activity.  Confirmed these are metastatic lesions.  · 1/19/2021, patient reports she will see thoracic surgeon tomorrow at the [facility] where she seeks second opinion evaluation, and to see whether she would be a candidate for resection of pulmonary nodules.  I discussed with the patient, she had at least 2 hypermetabolic lesions although they responded to chemotherapy, I do not think she would be a candidate for surgery.   · Thoracic surgeon from [facility] recommended metastectomy and to discontinue chemotherapy afterwards.   · Patient had a third opinion evaluation on 2/11/2021 by telemedicine with [facility] physician, who recommended palliative chemotherapy with no surgical intervention for metastatic pulmonary lesions.    3.   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  Low-dose Lovenox discontinued and initiated Xarelto 20 mg daily.  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  · Patient held Xarelto for 2 days prior to MediPort removal from the left chest wall and placement of new port in the right chest wall on 12/18/2020.  She resumed Xarelto that evening.  · Progressive swelling in the neck, face, upper extremities prompting hospitalization with CT scan showing  thrombosis in the right subclavian vein and SVC.  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  Although she had been off of Xarelto for a few days, clearly Xarelto was not prohibiting progression of her thrombosis after she resumed treatment and there was some evidence to suggest thrombosis had been present at least in the innominate vein on prior scan in September but now appears chronic.  Current acute thrombosis involving SVC and right subclavian.  · Patient was admitted and placed on heparin drip  · Patient was evaluated by vascular surgery and intervention was not recommended for thrombolysis/thrombectomy.  · On 12/22/2020, the patient developed worsening shortness of breath, increasing upper extremity edema consistent with worsening SVC syndrome.  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  · Symptoms improved and patient was discharged 12/24/2020 on Lovenox 100 mg every 12 hours.    · Returns 12/29/2020 for evaluation continuing Lovenox, overall tolerating it well.  No bleeding issues.  · Improved edema 1/5/2021.  Will continue Lovenox weight-based every 12 hours.  · On 1/19/2021 and 2/2/2021, patient reports improved facial swelling.  She however does have being bruise on her abdomen wall where she does Lovenox injection.  However she does not have a spontaneous epistaxis, gum bleeding or other bleeding.   · On 2/2/2021, we discussed that side effects of Avastin/biosimilar related to thrombosis.  Since this patient already has been on Lovenox, I think the benefits from treatment for her cancer will outweigh that risk of thrombosis, will proceed ahead with Avastin.  I cautioned patient to watch out for signs of worsening thrombosis patient voiced understanding.   · On 3/16/2021, no evidence of worsening DVT,  continue Lovenox.  · 5/11/2021 Lovenox dosing will be adjusted due to patient's weight loss.  We discussed she needs 1 mg/kg.  Her syringes are 100 mg syringes she will do 0.9 mL subcu every 12 hours.  · On 6/8/2021, patient continues to have easy bruising at the site of Lovenox injection, otherwise no spontaneous bleeding.      4.  This patient also has strong family history for malignancy the patient had appointment with genetic counseling on September 3, 2019.  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    5.  Mild anemia, improved since her surgery.    · She also has a history of iron deficiency.  Iron studies reveal a saturation of just 10%.  She was started on oral iron but was unable to tolerate due to GI side effects.   · Status post Injectafer 2/5/2020 and 2/12/2020   · Improved hemoglobin 13.5 on 1/5/2021.  · Hemoglobin 14.7 on 2/16/2021.    · Hemoglobin 16.2 on 3/2/2021.    · 3/16/2020 when hemoglobin now up to 16.2, hematocrit 47.6.  Patient admits that she has started smoking again, and I have encouraged her to quit.  She denies any snoring or sleep apnea diagnosis.  Patient is not taking oral iron.  We will closely monitor.  · 3/30/2020 hemoglobin 15.7, HCT 47.5.  Patient states that she has cut back significantly on her smoking, although not quit yet.  I have encouraged her to continue working on this.  We will continue to closely monitor.  · Hemoglobin 14.6 today on 6/8/2021 at the meantime he has worsening macrocytosis .6.  We will check vitamin B12 and folate.    6.  Peripheral neuropathy secondary to chemotherapy.   · This is mild involving bilateral hands and feet as reported on 9/16/2020.   · Will avoid chemotherapy with oxaliplatin.  · Stable mild neuropathy as of 11/10/2020  · Patient reports worsening peripheral neuropathy and cycle #5 chemotherapy on 12/8/2020 with and without irinotecan.   · On 1/5/2021, patient reports improvement of peripheral neuropathy, will add irinotecan back  to chemotherapy.  Continue to monitor and adjust medication.  · On 3/2/2021, patient reports no worsening of peripheral neuropathy after restarting irinotecan.   · As of 6/8/2021 her neuropathy is stable.    7.  History of hand-foot syndrome grade 3.    · It become so significant after cycle #7 FOLFOX treatment.  Discussed with patient on 5/13/2020, will discontinue the bolus 5-FU dose, and also decrease 50% of the infusion dose through the pump.   · We also use Medrol Dosepak for possible recurrent symptomology.  She responded this well.   · Her hand-foot syndrome improved.  · Under current treatment with FOLFIRI, patient not receiving 5-FU bolus.  No recurrent issues.   · Patient will be switched to maintenance chemotherapy using Xeloda in late April 2021.  · Following 5 doses of Xeloda, significant hand-foot syndrome with pain in the feet, significant erythema.  Xeloda held.  Will be further discussed with Dr. Nguyen to determine if the patient will resume 5-FU versus a dose reduction to Xeloda.  · Aggressive emollient moisturizer use twice daily for the past week and patient reports improvement in the dryness and erythema.  Xeloda will now be discontinued and patient will switch back to 5-FU.  · As of 5/25/2021 dryness and erythema/hyperpigmentation continues to improve.  Xeloda has been discontinued.  · 6/8/2021, patient reports much improved hand-foot syndrome, with remnant pigmentation on palms.    8.  Hyperlacrimation and mild scleral irritation related to 5-FU.  She has been taking steroid eyedrops.  This has improved her hyperlacrimation.  · Not currently an issue.  Continue to monitor with 5-FU.      9.  Abnormal liver function panel.  · Improved liver function panel 9/18/2020.  This is probably related to her recent infection.  · She has normalized AST, alk phosphatase, and a much improved only marginally elevated ALT 35 on 10/13/2020.    · Normalized liver function panel on 10/27/2020.    · Normal liver  panel on 11/10/2020.    · Normal liver function panel on 12/5/2021.    · Slightly worsening liver function with AST 33 and ALT 56 1/19/2021.    · Improved AST 21 and ALT 39 on 2/2/2021.    · ALT 59 and AST 29 on 3/2/2021.    · ALT 56, AST 24, alk phosphatase 121 and total bilirubin 0.3 on 4/13/2021.    · 6/8/2021, only marginally elevated ALT 48 otherwise normal liver function panel.  Continue to monitor.      10.  Intermittent leukocytopenia secondary to chemotherapy.   · WBC currently normal at 5220 and the neutrophil 3520 on 1/5/2021.  Continue to monitor.    · On 2/2/2021, WBC 4110 including ANC 2540.  Continue to monitor for now.  Consider G-CSF if neutropenia becomes an ongoing issue.   · 2/16/2021 WBC 4.6, ANC 2.79.    · 3/16/2021 WBC 3.93, ANC 2.26, continue to monitor.  · On 4/13/2021 WBC 4250 including ANC 2640.  · 5/25/2021 WBC 4.21 ANC 2.35.    · Stable WBC 5130 including ANC 3470 on 6/8/2021.  Continue to monitor.    11.  Depression.  Patient seen by JULIET Davenport on 11/9/2020.  She was started on mirtazapine.  Lexapro was discontinued.  This has definitely improved her mood with the patient feeling overall much better.  She is sleeping better.  Appetite is improved with her actually eating more than she wants to.    · Condition is stable.  She plans to continue follow-up with supportive oncology.    12.  Intermittent upper abdominal discomfort.  This improves with eating.  After further discussion with the patient she also notes 3 nights of increased reflux when laying down.  We discussed her symptoms could be related to increase stomach acid or potential ulcer.  She is currently taking Pepcid 20 mg daily.  I instructed her to add Prilosec 20 mg daily.   · On 3/16/2021 patient reports some worsening reflux symptoms last week.  She has increased her Pepcid to 20 mg twice daily, which did resolve her symptoms.  We discussed she can add Prilosec 20 mg daily as well.   · No complaint today.   Continue to monitor.    13.  Proteinuria: 3/30/2020: Patient is 2+ protein in her urine.  We will continue with Avastin and closely monitor.  No need for 24-hour urine at this time.  · Persistent 2+ proteinuria on 4/13/2021.  continue to monitor.  · 5/25/2021 protein urine only 1+.    · On 6/8/2021 with 1+ proteinuria.  Continue to monitor.    14.  Tachycardia:   · Patient was seen by Dr. Bustos cardiologist on 4/6/2021.  We will continue to follow-up in 3 months.      PLAN:  1. Proceed with cycle #5 maintenance chemotherapy with infusional 5-FU, leucovorin, Avastin today.  2. Continue antiemetics as needed at home for nausea.  3. Continue aggressive emollient moisturizers to hands and feet.  4. Continue Lovenox at 1 mg/kg twice daily.  5. Return in 2 weeks for follow-up visit with nurse elliott prior to cycle #6 maintenance chemotherapy, labs per protocol.    6. We will also check B12 and folate in 2 weeks for evaluation of macrocytosis.  7. We will arrange patient come back to see me in 4 weeks for follow-up prior to cycle #7 maintenance chemotherapy of 5-FU, leucovorin, Avastin.    8. Call/return sooner should develop any new concerns or problems.    Patient is on high risk medication requiring close monitoring for toxicity.      Dong Nguyen MD PhD  06/08/21      CC:  WAYNE Worley MD Carrie B. Scharf, MD

## 2021-06-10 ENCOUNTER — INFUSION (OUTPATIENT)
Dept: ONCOLOGY | Facility: HOSPITAL | Age: 41
End: 2021-06-10

## 2021-06-10 DIAGNOSIS — Z45.2 ENCOUNTER FOR FITTING AND ADJUSTMENT OF VASCULAR CATHETER: Primary | ICD-10-CM

## 2021-06-10 PROCEDURE — 25010000002 HEPARIN LOCK FLUSH PER 10 UNITS: Performed by: INTERNAL MEDICINE

## 2021-06-10 RX ORDER — SODIUM CHLORIDE 0.9 % (FLUSH) 0.9 %
10 SYRINGE (ML) INJECTION AS NEEDED
Status: CANCELLED | OUTPATIENT
Start: 2021-06-10

## 2021-06-10 RX ORDER — SODIUM CHLORIDE 0.9 % (FLUSH) 0.9 %
10 SYRINGE (ML) INJECTION AS NEEDED
Status: DISCONTINUED | OUTPATIENT
Start: 2021-06-10 | End: 2021-06-10 | Stop reason: HOSPADM

## 2021-06-10 RX ORDER — HEPARIN SODIUM (PORCINE) LOCK FLUSH IV SOLN 100 UNIT/ML 100 UNIT/ML
500 SOLUTION INTRAVENOUS AS NEEDED
Status: CANCELLED | OUTPATIENT
Start: 2021-06-10

## 2021-06-10 RX ORDER — HEPARIN SODIUM (PORCINE) LOCK FLUSH IV SOLN 100 UNIT/ML 100 UNIT/ML
500 SOLUTION INTRAVENOUS AS NEEDED
Status: DISCONTINUED | OUTPATIENT
Start: 2021-06-10 | End: 2021-06-10 | Stop reason: HOSPADM

## 2021-06-10 RX ADMIN — Medication 500 UNITS: at 12:17

## 2021-06-10 RX ADMIN — SODIUM CHLORIDE, PRESERVATIVE FREE 10 ML: 5 INJECTION INTRAVENOUS at 12:17

## 2021-06-22 ENCOUNTER — INFUSION (OUTPATIENT)
Dept: ONCOLOGY | Facility: HOSPITAL | Age: 41
End: 2021-06-22

## 2021-06-22 ENCOUNTER — OFFICE VISIT (OUTPATIENT)
Dept: ONCOLOGY | Facility: CLINIC | Age: 41
End: 2021-06-22

## 2021-06-22 VITALS
RESPIRATION RATE: 16 BRPM | TEMPERATURE: 96.9 F | OXYGEN SATURATION: 98 % | DIASTOLIC BLOOD PRESSURE: 94 MMHG | SYSTOLIC BLOOD PRESSURE: 139 MMHG | WEIGHT: 193.3 LBS | HEIGHT: 65 IN | HEART RATE: 100 BPM | BODY MASS INDEX: 32.21 KG/M2

## 2021-06-22 VITALS — SYSTOLIC BLOOD PRESSURE: 120 MMHG | DIASTOLIC BLOOD PRESSURE: 83 MMHG

## 2021-06-22 DIAGNOSIS — C20 ADENOCARCINOMA OF RECTUM (HCC): Primary | ICD-10-CM

## 2021-06-22 DIAGNOSIS — D75.89 MACROCYTOSIS WITHOUT ANEMIA: ICD-10-CM

## 2021-06-22 DIAGNOSIS — Z79.899 HIGH RISK MEDICATION USE: ICD-10-CM

## 2021-06-22 DIAGNOSIS — C78.00 MALIGNANT NEOPLASM METASTATIC TO LUNG, UNSPECIFIED LATERALITY (HCC): ICD-10-CM

## 2021-06-22 DIAGNOSIS — B37.2 CANDIDIASIS OF SKIN: ICD-10-CM

## 2021-06-22 DIAGNOSIS — C20 ADENOCARCINOMA OF RECTUM (HCC): Primary | Chronic | ICD-10-CM

## 2021-06-22 DIAGNOSIS — Z79.01 CHRONIC ANTICOAGULATION: Chronic | ICD-10-CM

## 2021-06-22 DIAGNOSIS — C20 ADENOCARCINOMA OF RECTUM (HCC): ICD-10-CM

## 2021-06-22 LAB
ALBUMIN SERPL-MCNC: 3.8 G/DL (ref 3.5–5.2)
ALBUMIN/GLOB SERPL: 1.2 G/DL (ref 1.1–2.4)
ALP SERPL-CCNC: 91 U/L (ref 38–116)
ALT SERPL W P-5'-P-CCNC: 42 U/L (ref 0–33)
ANION GAP SERPL CALCULATED.3IONS-SCNC: 11.5 MMOL/L (ref 5–15)
AST SERPL-CCNC: 23 U/L (ref 0–32)
BASOPHILS # BLD AUTO: 0.02 10*3/MM3 (ref 0–0.2)
BASOPHILS NFR BLD AUTO: 0.3 % (ref 0–1.5)
BILIRUB SERPL-MCNC: 0.2 MG/DL (ref 0.2–1.2)
BILIRUB UR QL STRIP: NEGATIVE
BUN SERPL-MCNC: 12 MG/DL (ref 6–20)
BUN/CREAT SERPL: 14.6 (ref 7.3–30)
CALCIUM SPEC-SCNC: 9.3 MG/DL (ref 8.5–10.2)
CHLORIDE SERPL-SCNC: 101 MMOL/L (ref 98–107)
CLARITY UR: ABNORMAL
CO2 SERPL-SCNC: 23.5 MMOL/L (ref 22–29)
COLOR UR: YELLOW
CREAT SERPL-MCNC: 0.82 MG/DL (ref 0.6–1.1)
DEPRECATED RDW RBC AUTO: 57.5 FL (ref 37–54)
EOSINOPHIL # BLD AUTO: 0.12 10*3/MM3 (ref 0–0.4)
EOSINOPHIL NFR BLD AUTO: 2 % (ref 0.3–6.2)
ERYTHROCYTE [DISTWIDTH] IN BLOOD BY AUTOMATED COUNT: 14.6 % (ref 12.3–15.4)
FOLATE SERPL-MCNC: >20 NG/ML (ref 4.78–24.2)
GFR SERPL CREATININE-BSD FRML MDRD: 77 ML/MIN/1.73
GLOBULIN UR ELPH-MCNC: 3.2 GM/DL (ref 1.8–3.5)
GLUCOSE SERPL-MCNC: 96 MG/DL (ref 74–124)
GLUCOSE UR STRIP-MCNC: NEGATIVE MG/DL
HCT VFR BLD AUTO: 45.2 % (ref 34–46.6)
HGB BLD-MCNC: 14.9 G/DL (ref 12–15.9)
HGB UR QL STRIP.AUTO: NEGATIVE
IMM GRANULOCYTES # BLD AUTO: 0 10*3/MM3 (ref 0–0.05)
IMM GRANULOCYTES NFR BLD AUTO: 0 % (ref 0–0.5)
KETONES UR QL STRIP: NEGATIVE
LEUKOCYTE ESTERASE UR QL STRIP.AUTO: ABNORMAL
LYMPHOCYTES # BLD AUTO: 1.12 10*3/MM3 (ref 0.7–3.1)
LYMPHOCYTES NFR BLD AUTO: 18.5 % (ref 19.6–45.3)
MCH RBC QN AUTO: 35 PG (ref 26.6–33)
MCHC RBC AUTO-ENTMCNC: 33 G/DL (ref 31.5–35.7)
MCV RBC AUTO: 106.1 FL (ref 79–97)
MONOCYTES # BLD AUTO: 0.48 10*3/MM3 (ref 0.1–0.9)
MONOCYTES NFR BLD AUTO: 7.9 % (ref 5–12)
NEUTROPHILS NFR BLD AUTO: 4.33 10*3/MM3 (ref 1.7–7)
NEUTROPHILS NFR BLD AUTO: 71.3 % (ref 42.7–76)
NITRITE UR QL STRIP: NEGATIVE
NRBC BLD AUTO-RTO: 0 /100 WBC (ref 0–0.2)
PH UR STRIP.AUTO: 6 [PH] (ref 4.5–8)
PLATELET # BLD AUTO: 177 10*3/MM3 (ref 140–450)
PMV BLD AUTO: 9.3 FL (ref 6–12)
POTASSIUM SERPL-SCNC: 4.4 MMOL/L (ref 3.5–4.7)
PROT SERPL-MCNC: 7 G/DL (ref 6.3–8)
PROT UR QL STRIP: ABNORMAL
RBC # BLD AUTO: 4.26 10*6/MM3 (ref 3.77–5.28)
SODIUM SERPL-SCNC: 136 MMOL/L (ref 134–145)
SP GR UR STRIP: >=1.03 (ref 1–1.03)
UROBILINOGEN UR QL STRIP: ABNORMAL
VIT B12 BLD-MCNC: 357 PG/ML (ref 211–946)
WBC # BLD AUTO: 6.07 10*3/MM3 (ref 3.4–10.8)

## 2021-06-22 PROCEDURE — 96368 THER/DIAG CONCURRENT INF: CPT

## 2021-06-22 PROCEDURE — 96367 TX/PROPH/DG ADDL SEQ IV INF: CPT

## 2021-06-22 PROCEDURE — 81003 URINALYSIS AUTO W/O SCOPE: CPT

## 2021-06-22 PROCEDURE — 25010000002 LEUCOVORIN CALCIUM PER 50 MG: Performed by: NURSE PRACTITIONER

## 2021-06-22 PROCEDURE — 25010000002 FOSAPREPITANT PER 1 MG: Performed by: NURSE PRACTITIONER

## 2021-06-22 PROCEDURE — 96375 TX/PRO/DX INJ NEW DRUG ADDON: CPT

## 2021-06-22 PROCEDURE — 25010000002 PALONOSETRON PER 25 MCG: Performed by: NURSE PRACTITIONER

## 2021-06-22 PROCEDURE — 82746 ASSAY OF FOLIC ACID SERUM: CPT | Performed by: INTERNAL MEDICINE

## 2021-06-22 PROCEDURE — 25010000002 DEXAMETHASONE SODIUM PHOSPHATE 100 MG/10ML SOLUTION: Performed by: NURSE PRACTITIONER

## 2021-06-22 PROCEDURE — 96413 CHEMO IV INFUSION 1 HR: CPT

## 2021-06-22 PROCEDURE — 85025 COMPLETE CBC W/AUTO DIFF WBC: CPT

## 2021-06-22 PROCEDURE — 96416 CHEMO PROLONG INFUSE W/PUMP: CPT

## 2021-06-22 PROCEDURE — 25010000002 BEVACIZUMAB PER 10 MG: Performed by: NURSE PRACTITIONER

## 2021-06-22 PROCEDURE — 82607 VITAMIN B-12: CPT | Performed by: INTERNAL MEDICINE

## 2021-06-22 PROCEDURE — 25010000002 FLUOROURACIL PER 500 MG: Performed by: NURSE PRACTITIONER

## 2021-06-22 PROCEDURE — 80053 COMPREHEN METABOLIC PANEL: CPT

## 2021-06-22 PROCEDURE — 25010000002 BEVACIZUMAB 100 MG/4ML SOLUTION 4 ML VIAL: Performed by: NURSE PRACTITIONER

## 2021-06-22 PROCEDURE — 99214 OFFICE O/P EST MOD 30 MIN: CPT | Performed by: NURSE PRACTITIONER

## 2021-06-22 RX ORDER — PALONOSETRON 0.05 MG/ML
0.25 INJECTION, SOLUTION INTRAVENOUS ONCE
Status: CANCELLED | OUTPATIENT
Start: 2021-06-22

## 2021-06-22 RX ORDER — PALONOSETRON 0.05 MG/ML
0.25 INJECTION, SOLUTION INTRAVENOUS ONCE
Status: COMPLETED | OUTPATIENT
Start: 2021-06-22 | End: 2021-06-22

## 2021-06-22 RX ORDER — SODIUM CHLORIDE 9 MG/ML
250 INJECTION, SOLUTION INTRAVENOUS ONCE
Status: COMPLETED | OUTPATIENT
Start: 2021-06-22 | End: 2021-06-22

## 2021-06-22 RX ORDER — NYSTATIN 100000 U/G
CREAM TOPICAL 2 TIMES DAILY PRN
Qty: 30 G | Refills: 2 | Status: SHIPPED | OUTPATIENT
Start: 2021-06-22 | End: 2021-12-01 | Stop reason: SDUPTHER

## 2021-06-22 RX ORDER — SODIUM CHLORIDE 9 MG/ML
250 INJECTION, SOLUTION INTRAVENOUS ONCE
Status: CANCELLED | OUTPATIENT
Start: 2021-06-22

## 2021-06-22 RX ADMIN — FLUOROURACIL 4850 MG: 50 INJECTION, SOLUTION INTRAVENOUS at 13:38

## 2021-06-22 RX ADMIN — DEXAMETHASONE SODIUM PHOSPHATE 12 MG: 10 INJECTION, SOLUTION INTRAMUSCULAR; INTRAVENOUS at 11:34

## 2021-06-22 RX ADMIN — PALONOSETRON 0.25 MG: 0.05 INJECTION, SOLUTION INTRAVENOUS at 11:34

## 2021-06-22 RX ADMIN — SODIUM CHLORIDE 810 MG: 900 INJECTION, SOLUTION INTRAVENOUS at 13:00

## 2021-06-22 RX ADMIN — SODIUM CHLORIDE 250 ML: 9 INJECTION, SOLUTION INTRAVENOUS at 11:34

## 2021-06-22 RX ADMIN — SODIUM CHLORIDE 100 ML: 9 INJECTION, SOLUTION INTRAVENOUS at 11:51

## 2021-06-22 RX ADMIN — BEVACIZUMAB 900 MG: 400 INJECTION, SOLUTION INTRAVENOUS at 12:23

## 2021-06-22 NOTE — PROGRESS NOTES
REASON FOR FOLLOW UP:     1. Rectal cancer, rectal ultrasound examination in July 2019 reported T3N0 disease without lymphadenopathy. She does have small pulmonary nodule 6-7 mm and 2 mm with indeterminate feature. There is no solid evidence of metastatic disease otherwise. Patient has stage IIA (T3N0M0) disease.  2. The patient is heterozygous factor V Leiden, was on prophylactic anticoagulation with Lovenox 40 mg daily given her increased risk of thrombosis with MediPort and GI malignancy.   3. PET scan on 8/8/2019 reported an 8 mm hypermetabolic right upper lobe pulmonary nodule, which is suspicious for metastatic as well.    4. Patient had a PowerPort placement on the left upper chest by Dr. Joseph on 8/9/2019.  5. Patient was started on concurrent infusional 5-FU chemoradiation therapy on 8/12/2019, with planned complete surgical resection and further adjuvant chemotherapy with intention to cure the disease.   6. Patient underwent surgical resection of the primary rectal cancer by Dr. Ye on 12/2/2019.  Pathology evaluation reported residual T3N1 disease stage IIIb.  7. Diarrhea related to therapy and radiation.   8. Patient was started cycle 1 day 1 of adjuvant FOLFOX 6 on 1/23/2020.  9. On 2/5/2020 FOLFOX 6 cycle 2 delayed secondary to neutropenia.  Patient was given 3 days of Granix injection.  After cycle #2 we planned 3 days of Granix with each cycle.   10. Patient also intolerant of oral iron.  Patient received 2 doses of IV injectafer on 02/05/2020 and 02/12/2020.   11. 02/12/2020 Proceed with cycle #2 of FOLFOX 6 with 3 days of Granix.  12. On 3/11/2020 cycle 4 postponed secondary to abdominal pain and occasional low-grade fevers.  CT scans ordered.  13. Cycle #4 resumed after CT scan revealed no evidence of disease.  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 Ye. patient has no fever.  Continue to observe.   14. Grade 3  hand-foot syndrome secondary to 5-FU after cycle #7 FOLFOX 6 chemotherapy, prompting ER visit.  Also has worsening peripheral neuropathy.   15. Cycle #8 FOLFOX 6 was given on 5/13/2020, with 50% dose reduction for both 5-FU and oxaliplatin, and also examination of bolus 5-FU.   16. PET scan examination on 5/21/2020 reported no evidence of metastatic disease in the chest abdomen pelvis.  Stable 6 mm RUL pulmonary nodule.  17. On 5/27/2020, I discussed with the patient that we can discontinue chemotherapy at this time.   18. Patient had a surgical procedure for low anterior colon resection, coloanal anastomosis on 8/3/2020.  19. CT scan for chest abdomen pelvis on 9/9/2020 reported a new 8 mm noncalcified pulmonary nodule in the left lower lobe of the lung.  Otherwise stable right upper lobe 6 mm pulmonary nodule, and no evidence of disease recurrence in the abdomen or pelvis.  20. PET scan examination on 9/18/2020 reported multiple hypermetabolic small pulmonary nodules/ new pulmonary nodules.   21. Patient was started on pelvic chemotherapy with FOLFIRI regimen on 10/13/2020.   22. 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 It reported tumor mutation burden 5 Muts/Mb, microsatellite stable, TP53 mutation [id number], and others.   23. Cycle #5 was without irinotecan, due to peripheral neuropathy.  24. 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  Patient was switched to weight-based Lovenox injection.  25. Cycle #6 5-FU and irinotecan was delayed by 2 weeks because of the above incident.  26. Patient had a telemedicine evaluation at that the [facility] [place] in February 2021.  They agreed with our treatment plan for palliative chemotherapy followed by maintenance chemotherapy.       HISTORY OF PRESENT ILLNESS:  The patient is a 40 y.o. female the above-mentioned history  who is here today for lab review and evaluation prior to her maintenance chemotherapy with 5-FU, leucovorin, Avastin.  She continues to report that she is tolerating infusional 5-FU much better than Xeloda.  She states she did have some mild issues with her hands after one 5-FU infusion, but feels that it quickly resolved with aggressive moisturization.  Otherwise no significant issues with nausea, vomiting.  Her ostomy is functioning well.  She does continue on Lovenox and denies bleeding issues.        Past Medical History:   Diagnosis Date   • Abdominopelvic abscess (CMS/HCC) 08/12/2020    ADMITTED TO [facility]   • Abnormal Pap smear of cervix 02/02/1998    JULIUS I   • Anemia in pregnancy    • Anxiety    • Bilateral epiphora 04/2020   • Bilateral hand swelling 05/02/2020    SEEN AT [facility] ER   • Cervical lymphadenitis 06/06/2012    SEEN AT [facility] ER   • Chemotherapy induced neutropenia (CMS/HCC) 04/2020   • Chemotherapy-induced nausea 02/2020   • Chemotherapy-induced thrombocytopenia 05/2020   • Chronic diarrhea    • Colon polyps     FOLLOWED BY DR. GERONIMO ESPARZA   • Cystitis 04/24/2020    WITH DEHYDRATION, ADMITTED TO [facility]   • Cystitis 10/27/2020   • Drug-induced peripheral neuropathy (CMS/HCC) 05/2020   • Fistula of intestine    • GERD (gastroesophageal reflux disease)    • Hand foot syndrome 05/2020   • Heart murmur     IN CHILDHOOD   • Hematochezia    • Hemorrhoids    • Heterozygous factor V Leiden mutation (CMS/[facility])     DX 8-2-2019   • History of anemia    • History of chemotherapy 2019    FOLLOWED BY DR. ALEXANDRU HUNT   • History of gestational diabetes    • History of pre-eclampsia    • History of radiation therapy 2019    FOLLOWED BY DR. JAVON LEWIS   • History of TB skin testing 01/17/2009    TB Skin Test   • HPV in female 1998   • Hypokalemia 09/2019   • Hypomagnesemia 09/2019   • IBS (irritable bowel syndrome)    • Ileostomy in place (CMS/[facility])     FOLLOWED BY DR. GERONIMO ESPARZA   • Infected insect bite of neck  2016    SEEN AT [facility]   • Kidney stone    • Kidney stones 2007    SEEN AT [facility] ER   • Lump of right breast     SWOLLEN LYMPH NODE   • Lung cancer (CMS/HCC) 2020    METASTATIC LUNG CANCER   • Monopolar depression (CMS/HCC)    • On anticoagulant therapy    • Perirectal abscess 2020   • PIH (pregnancy induced hypertension)    • Pulmonary embolism without acute cor pulmonale (CMS/HCC) 09/20/2019    X 3; ADMITTED TO [facility]   • Pulmonary nodule, right 2020   • Rectal cancer (CMS/HCC) 2019    STAGE IIA, INVASIVE MODERATELY DIFFERENTIATED ADENOCARCINOMA, CHEMO AND XRT FINISHED 2019   • Right shoulder pain 2020    SEEN AT [facility] ER   • Right ureteral stone 10/01/2019    SEEN AT [facility] ER   • SAB (spontaneous ) 1996     A2-1 INDUCED   • Sciatica    • Sepsis due to cellulitis (CMS/HCC) 2002    LEFT GREAT TOE, ADMITTED TO [facility]   • Tachycardia 2020   • Urinary urgency 2020     Past Surgical History:   Procedure Laterality Date   • CHOLECYSTECTOMY N/A 10/10/2003    LAPAROSCOPIC WITH CHOLANGIOGRAM, DR. JAMEY TALAVERA AT [facility]   • COLON RESECTION N/A 2019    Procedure: laparoscopic low anterior colon resection with mobilization of splenic flexure and diverting loop ileostomy: ERAS;  Surgeon: Padmaja Esparza MD;  Location: [facility];  Service: General   • COLON RESECTION N/A 8/3/2020    Procedure: LOW ANTERIOR COLON RESECTION, COLOANAL ANASTOMOSIS, MOBILIZATION SPLENIC FLEXURE;  Surgeon: Padmaja Esparza MD;  Location: [facility] OR;  Service: General;  Laterality: N/A;   • COLONOSCOPY N/A 7/15/2020    PATENT ANASTAMOSIS IN MID RECTUM, RESCOPE IN 1 YR, DR. PADMAJA ESPARZA AT [facility]   • COLONOSCOPY W/ POLYPECTOMY N/A 2019    15 MM TUBULOVILLOUS ADENOMA POLYP IN HEPATIC FLEXURE, 20 MMTUBULOVILLOUS ADENOMA WITH HIGH GRADE DYSPLASIA IN RECTOSIGMOID, 4 CM MASS IN MID RECTUM, PATH: INVASIVE MODERATELY DIFFERENTIATED ADENOCARCINOMA, DR. JENNIFER LI AT  [facility]   • DILATATION AND EVACUATION N/A 2009   • ENDOSCOPY N/A 07/08/2019    LA GRADE A ESOPHAGITIS, GASTRITIS, ALL BIOPSIES BENIGN, DR. ROLAND LI AT [facility]   • INCISION AND DRAINAGE PERIRECTAL ABSCESS N/A 8/14/2020    Procedure: INCISION AND DRAINAGE OF retrorectal dehiscence abcess with drain placement and irrigation;  Surgeon: Geronimo Ye MD;  Location: [facility] MAIN OR;  Service: General;  Laterality: N/A;   • PAP SMEAR N/A 01/24/2014   • SIGMOIDOSCOPY N/A 7/24/2019    INFILTRATIVE PARTIALLY OBSTRUCTING LARGE RECTAL CANCER, AREA TATOOED, DR. GERONIMO YE AT [facility]   • SIGMOIDOSCOPY N/A 11/23/2019    AN ULCERATED PARTIALLY OBSTRUCTING MASS IN MID RECTUM, AREA TATTOOED, DR. GERONIMO YE AT [facility]   • TRANSRECTAL ULTRASOUND N/A 7/24/2019    Procedure: ULTRASOUND TRANSRECTAL;  Surgeon: Geronimo Ye MD;  Location: [facility] ENDOSCOPY;  Service: General   • URETEROSCOPY LASER LITHOTRIPSY WITH STENT INSERTION Right 10/30/2019    DR. ESTUARDO BEASLEY AT [place]   • VAGINAL DELIVERY N/A 09/18/1998   • VENOUS ACCESS DEVICE (PORT) INSERTION Left 8/9/2019    Procedure: INSERTION VENOUS ACCESS DEVICE;  Surgeon: Sj Joseph MD;  Location: [facility] OR [facility];  Service: General   • VENOUS ACCESS DEVICE (PORT) INSERTION N/A 12/18/2020    Procedure: INSERTION VENOUS ACCESS DEVICE right side, removal venous access device left side;  Surgeon: Sj Joseph MD;  Location: [facility] OR [facility];  Service: General;  Laterality: N/A;   • WISDOM TOOTH EXTRACTION Bilateral 1993       HEMATOLOGIC/ONCOLOGIC HISTORY:      The patient reports she has intermittent rectal bleeding and urgency, mucous with her stool, starting sometime in 2016. At that time she was referred to GI service, and was diagnosed of irritable bowel syndrome. Nevertheless she had worsening urgency for bowel movement, and had a couple of incidents losing control of stool. She was recently seen by Roland Li MD again and had colonoscopy and  EGD exam on 07/08/2019. She was found having a circumferential rectal mass. According to the procedure note, the patient had a fungating circumferential bleeding 4 cm mass in the middle rectum, at distance between 13 cm and 17 cm from the anus. Mass was causing partial obstruction. There were also colon polyps found at the hepatic flexure and also at the rectosigmoid junction 23 cm from the anus. Both were resected and retrieved. EGD examination reported grade A esophagitis at the GE junction and patchy discontinuous erythema and aggravation of the mucosa without bleeding in the stomach body. There is normal mucosa of the duodenum. Pathology evaluation from this procedure reported moderately differentiated adenocarcinoma involving the rectal mass. The rectal sigmoid junction polyp was tubular/tubulovillous adenoma with high grade dysplasia negative for invasive malignancy. The hepatic flexure polyp was also tubular/tubulovillous adenoma negative for high grade dysplasia or malignancy. The biopsy from the esophagus reports squamocolumnar mucosa with inflammatory changes suggestive of mild reflux esophagitis, negative for interstitial metaplasia. Gastric biopsy was benign and duodenal biopsy was also benign. There is no mention of H-pylori.     Patient was subsequently referred to colorectal surgeon Padmaja Ye MD for further evaluation. The patient had CT scan examination for chest, abdomen and pelvis requested by Dr. Ye and were done on 07/13/2019. The study reported no evidence of lymphadenopathy in the abdomen and pelvis. There was wall thickening of the rectosigmoid junction. Normal spleen, pancreas, adrenal glands and kidneys. There was fatty liver infiltration but no focal lymphatic lesions. There was a small 6-7 mm noncalcified nodule in the right upper lobe and a tiny 3 mm subpleural nodule in the right middle lobe. No mediastinal or hilar lymphadenopathy.     Dr. Ye performed staging rectal  ultrasound examination. This study reported tumor penetrating through the muscularis propria as T3 disease; there were no lymph nodes identified.    She had a hospitalization in late September 2019 because of newly discovered pulmonary emboli.  She was on prophylactic Lovenox prior to the incident of PE.  Now she is on full dose Xarelto anticoagulation.  Patient reports no further chest pain dyspnea.  She denies bleeding or bruising.  During her hospitalization, she was seen by Dr. Ye, who plans to have surgery 8 to 12 weeks after finishing radiation therapy.  She finished her radiation on 9/19/2019.     I noticed patient also presented to the emergency room on 10/1/2019 complaining of right flank area pain.  I reviewed the images studies and indeed she has a very small 1 to 2 mm obstructing kidney stone in the UV junction.  Patient is still symptomatic with some pains and dysuria.  She denies fever sweating or chills.    Laboratory study on 10/7/2019 reported normal CBC including hemoglobin 13.1, platelets 301,000, WBC 6170 and ANC 4900 lymphocytes 590 monocytes 480.      The nurse reported malfunction of port-a-catheter on 10/7/2019.  Port study on 10/8/2019 showed fibrin sheath around the distal aspect of the Mediport catheter in the SVC. This does not appear to hinder injection, but does prevent aspiration at this time.    Patient underwent surgical resection of the colon on 12/2/2019 with Dr. Ye.  Pathology evaluation reported residual T3 disease, also 1 out of 14 lymph nodes positive for malignancy.  So this patient in either had at least stage IIIb disease (T3 N1 M0?).      Adjuvant chemotherapy FOLFOX 6 will be started on 1/22/2020.  Laboratory study reported iron saturation 10%, free iron 31 TIBC 319 and ferritin 168.  Her hemoglobin was 11.8, WBC 5600, and platelets 347,000.    Patient was here on 02/12/2020 for cycle 2 of her FOLFOX.  This is delayed x1 week secondary to neutropenia.  The patient  ultimately received 3 days worth of Neupogen with recovery of her blood counts.  Of note, the patient struggled with significant nausea without any episodes of vomiting following cycle #1 of chemotherapy resulting in significant weight loss.  She is up 12 pounds over the last week as her appetite has normalized.  We will add Emend to her care plan.    She is having several loose stools per her colostomy and has been hesitant to take Imodium due to prior history of constipation.  I encouraged her to try this with a maximum of 8 tablets/day.  She denies any infectious symptoms including fevers or chills.  No excess bleeding or bruising noted.  She had the expected cold sensitivity related to the Oxaliplatin for about 3 days following treatment.    Labs from 02/12/2020 demonstrated total white blood cell count of 5.14, ANC of 3.06, hemoglobin of 11.2, platelets of 211,000.  She was found to be iron deficient last week and is intolerant to oral iron secondary to GI distress.  For this reason, she initiated IV iron therapy with Injectafer last week.  She had received her second dose 02/12/2020    Patient has normalized hemoglobin 12.2 on 2/26/2020.    She reported on 5/5/2020 she had a recent visit to the emergency department for what was suspected to be an allergic reaction.  She called our on-call service on Saturday with reports of hand and face swelling.  She was instructed to proceed to the emergency department at which time she was assessed and prescribed a Medrol Dosepak.  She had just completed her 5-FU and was unhooked on Friday, 5/3/2020.  Her symptoms have improved.  She does report persistent hyperpigmentation and mild swelling of the palms of the hands but this is much improved.  It was her right hand specifically that was swollen.  Her facial swelling has resolved.  She continues on Cefdinir nd since has 1 day remaining, she was prescribed cefdinir for a UTI requiring hospitalization at the end of April.   Reports no new symptoms.  Her labs are stable.  She is scheduled for treatment again.    Patient states at this time she is not tolerating her chemotherapy well.     Patient seen in the emergency department on 5/2/2020 for what was suspected to be an allergic reaction.  She called our on-call service on Saturday explaining that she was experiencing hand and face swelling.  She was instructed to proceed to the emergency department at which time she was assessed and prescribed a Medrol Dosepak.  She had just completed her 5-FU and was unhooked on Friday, 5/1/2020.      She reports since her ED visit on 5/2/2020 her symptoms have not improved. Her hands and feet were swollen upon presenting to the ED and she could barely make a fist. She states that she feels so swollen she is not able to stand it. She states that her skin on the hands and feet are peeling extensively as well, besides changing color to more dark.     She also reports significant fatigue after her first week of the 5-FU treatment but she expected this side effect. She also notices significant increase in her neuropathy to the point that she is not able to even walk around in her home without her house slippers due to irritation from her rugs. She denies and associated nausea or vomiting at this time. She also has episodes of epistaxis every day after the chemotherapy cycle #7.  She does report working full-time during the week of chemotherapy.    Laboratory studies, 5/13/2020, show moderate thrombocytopenia platelets 123,000, low normal WBC 4140 including ANC 2720 and normal hemoglobin 13.4.  Because significant hand-foot syndrome, will decrease both 5-FU and oxaliplatin by 50%, and eliminate bolus dose of 5-FU.    On 5/21/2020 patient had a PET scan performed which showed no convincing evidence of residual disease in abdomen or pelvis, no metastatic disease within the chest or neck.     Cycle #8 FOLFOX 6 was given on 5/13/2020, with 50% dose reduction  for both 5-FU and oxaliplatin, and also examination of bolus 5-FU.  She states on 5/27/2020 that with the recent reduction of the chemotherapy she feels significantly better. She has more energy and is able to do her daily routine.      PET scan examination on 5/21/2020 reported no evidence of metastatic disease in chest abdomen pelvis.  There was a stable 6 mm right upper lobe pulmonary nodule.    Laboratory studies on 5/27/2020 showed mild leukocytopenia WBC 3720 but a normal ANC 2250 and lymphocytes 630.  Normal platelets 163,000 and hemoglobin 12.6.  Chemistry lab reported normal renal function, liver function panel and a electrolytes, elevated glucose 164.    Laboratory studies 6/24/2020, showed normal hemoglobin 13.4 but macrocytosis .9.  Normal platelets 210,000 and WBC 5870.  She had normal CMP.     Patient last time was here in late June 2020.  Since that time she had surgical procedure for low anterior colon resection, coloanal anastomosis on 8/3/2020.  She later developed a perirectal abscess, required surgical drainage on 8/14/2020.  She was discharged on 8/27/2020.    Patient reports to me she still has lower abdominal wall vacuum suction in place.  She denies fever sweating chills.  Performance status is ECOG 1.  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  She does have visiting nurse come to the home for wound care.    CT scan for chest abdomen pelvis on 9/9/2020 reported a new 8 mm noncalcified pulmonary nodule in the left lower lobe.  Otherwise stable right upper lobe 6 mm pulmonary nodule, and no evidence of disease recurrence in the abdomen or pelvis.     Laboratory study on 9/16/2020 reported elevated AST 55, ALT 95, alk phosphatase 143 but normal total bilirubin 0.3.  Chemistry lab otherwise unremarkable.  She has completed normal CBC.      Because of the abnormal CT scan examination for chest abdomen pelvis on 9/9/2020 reported a new 8 mm noncalcified  pulmonary nodule in the left lower lobe, we obtained a PET scan examination for further evaluation, which was done on 9/18/2020.  This study reported several pulmonary nodules, some of them are hypermetabolic, newly developed compared to previous PET scan in May 2020.  Certainly does highly suspicious for metastatic disease.  There are also hypermetabolic activity in the abdomen and pelvic area which is hard to differentiate from surgical scar versus metastatic malignancy.    Laboratory study on 10/13/2020 reported normal CBC with Hb 13.9, platelets 302,000 and WBC 5520 including ANC 3830.  Chemistry lab reported normalized AST 20, alk phosphatase 116 improved ALT 35, and maintains normal bilirubin, with normal electrolytes and liver function panel.     Patient was started on first cycle of palliative chemotherapy FOLFIRI on 10/13/2020.    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  Patient had a new port placed 12/18/2020, and had held her Xarelto for 2 days prior to surgery.  She presented to the ER on 12/21/20 with complaints of facial and arm swelling for 3 days.  She also noted increased shortness of breath.  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  Thrombus within the right internal jugular vein cannot be excluded.  Patient was started on IV heparin, and eventually transitioned to weight-based Lovenox, which she now continues.      MEDICATIONS    Current Outpatient Medications:   •  clonazePAM (KlonoPIN) 1 MG tablet, TAKE 1 TABLET BY MOUTH THREE TIMES A DAY AS NEEDED FOR ANXIETY OR SEIZURES, Disp: 90 tablet, Rfl: 0  •  dicyclomine (BENTYL) 20 MG tablet, TAKE 1 TABLET BY MOUTH EVERY 6 HOURS AS NEEDED, Disp: 360 tablet, Rfl: 0  •  diphenoxylate-atropine (LOMOTIL) 2.5-0.025 MG per tablet, TAKE 1 TABLET BY MOUTH 4 (FOUR)  TIMES A DAY AS NEEDED FOR DIARRHEA., Disp: 120 tablet, Rfl: 1  •  enoxaparin (Lovenox) 100 MG/ML solution syringe, Inject 1 mL under the skin into the appropriate area as directed Every 12 (Twelve) Hours., Disp: 60 mL, Rfl: 2  •  escitalopram (LEXAPRO) 10 MG tablet, TAKE 1 TABLET BY MOUTH EVERY DAY, Disp: 30 tablet, Rfl: 5  •  famotidine (PEPCID) 20 MG tablet, TAKE 1 TABLET BY MOUTH TWICE A DAY, Disp: 180 tablet, Rfl: 1  •  Loratadine (CLARITIN) 10 MG capsule, Take 10 mg by mouth Every Evening., Disp: , Rfl:   •  mineral oil-hydrophilic petrolatum (AQUAPHOR) ointment, Apply 1 application topically to the appropriate area as directed As Needed for Dry Skin., Disp: , Rfl:   •  ondansetron (ZOFRAN) 8 MG tablet, Take 1 tablet by mouth 3 (Three) Times a Day As Needed for Nausea or Vomiting., Disp: 60 tablet, Rfl: 5  •  prochlorperazine (COMPAZINE) 10 MG tablet, Take 1 tablet by mouth Every 6 (Six) Hours As Needed for Nausea or Vomiting., Disp: 30 tablet, Rfl: 2  •  promethazine (PHENERGAN) 25 MG tablet, Take 1 tablet by mouth Every 6 (Six) Hours As Needed for Nausea or Vomiting., Disp: 60 tablet, Rfl: 2  •  nystatin (MYCOSTATIN) 092756 UNIT/GM cream, Apply  topically to the appropriate area as directed 2 (Two) Times a Day As Needed (rash)., Disp: 30 g, Rfl: 2  No current facility-administered medications for this visit.    Facility-Administered Medications Ordered in Other Visits:   •  bevacizumab (AVASTIN) 900 mg in sodium chloride 0.9 % 136 mL chemo IVPB, 900 mg, Intravenous, Once, Sheba Matias APRVIRIDIANA  •  fluorouracil (ADRUCIL) 4,850 mg, sodium chloride 0.9 % 143 mL 240 mL chemo infusion - FOR HOME USE, 2,400 mg/m2 (Treatment Plan Recorded), Intravenous, Once, Sheba Matias APRVIRIDIANA  •  FOSAPREPITANT 150 MG/100ML NORMAL SALINE (CBC) IVPB 100 mL 100 mL, 150 mg, Intravenous, Once, Sheba Matias APRN, Last Rate: 200 mL/hr at 06/22/21 1151, 100 mL at 06/22/21 1151  •  leucovorin 810 mg in sodium chloride  0.9 % 290.5 mL IVPB, 400 mg/m2 (Treatment Plan Recorded), Intravenous, Once, Sheba Matias SHILPA, APRN    ALLERGIES:   No Known Allergies    SOCIAL HISTORY:       Social History     Tobacco Use   • Smoking status: Current Every Day Smoker     Packs/day: 0.50     Years: 24.00     Pack years: 12.00     Types: Electronic Cigarette, Cigarettes   • Smokeless tobacco: Never Used   Vaping Use   • Vaping Use: Some days   • Substances: Nicotine   • Devices: Disposable   Substance Use Topics   • Alcohol use: Not Currently     Comment: Caffeine use rare   • Drug use: No         FAMILY HISTORY:   Mother has positive factor V Leiden mutation, although she did not have thrombosis, mother also is coronary disease with stenting, she also is occasional bruising.  Maternal grandmother had DVT, she had multiple surgical procedures.  Patient mother's half-brother had metastatic colon cancer at diagnosis in his 50s.  Maternal great aunt has breast cancer.  Patient will follow his skin cancer in his 60s, exclusive.    Review of Systems   Constitutional: Negative for appetite change, fever and unexpected weight change.   HENT: Negative for mouth sores and sore throat.    Eyes: Negative for visual disturbance.   Respiratory: Negative for cough and shortness of breath.    Cardiovascular: Negative for chest pain and leg swelling.   Gastrointestinal: Negative for abdominal pain, blood in stool and nausea.   Endocrine: Negative for cold intolerance.   Genitourinary: Negative for dysuria and hematuria.   Musculoskeletal: Negative for arthralgias and joint swelling.   Skin: Positive for color change (Hand-foot syndrome, improved) and rash.   Allergic/Immunologic: Immunocompromised state: Chemotherapy.   Neurological: Negative for dizziness and light-headedness.   Hematological: Negative for adenopathy. Bruises/bleeds easily (Easy bruising at the site of injection of Lovenox ).   Psychiatric/Behavioral: Negative for agitation and confusion.  "      06/22/2021 ROS unchanged from above except as updated.       Vitals:    06/22/21 1037   BP: 139/94   Pulse: 100   Resp: 16   Temp: 96.9 °F (36.1 °C)   TempSrc: Temporal   SpO2: 98%   Weight: 87.7 kg (193 lb 4.8 oz)   Height: 166 cm (65.35\")   PainSc: 0-No pain     Current Status 6/8/2021   ECOG score 0     Physical Exam  Vitals reviewed.   Constitutional:       General: She is not in acute distress.     Appearance: Normal appearance. She is well-developed. She is not ill-appearing.   HENT:      Head: Normocephalic and atraumatic.   Eyes:      General: No scleral icterus.     Conjunctiva/sclera: Conjunctivae normal.   Cardiovascular:      Rate and Rhythm: Normal rate and regular rhythm.      Heart sounds: Normal heart sounds.   Pulmonary:      Effort: Pulmonary effort is normal. No respiratory distress.      Breath sounds: Normal breath sounds. No wheezing.   Abdominal:      General: Bowel sounds are normal. There is no distension.      Palpations: Abdomen is soft. There is no mass.      Comments: Ostomy in place with liquid brown stool.  Scattered bruises and noduled on the abdomen bilaterally from Lovenox injections.  1 small area in abdominal fold of erythema consistent with candidiasis   Musculoskeletal:      Cervical back: Neck supple.      Right lower leg: No edema.      Left lower leg: No edema.   Lymphadenopathy:      Cervical: No cervical adenopathy.   Skin:     General: Skin is warm and dry.      Coloration: Skin is not jaundiced.      Findings: No rash.      Comments: Much improved hyperpigmentation of the palms and feet, with dryness.      Neurological:      Mental Status: She is alert and oriented to person, place, and time. Mental status is at baseline.      Cranial Nerves: No cranial nerve deficit.   Psychiatric:         Mood and Affect: Mood normal.         Thought Content: Thought content normal.     06/22/2021 physical exam is unchanged from above except as updated.    RECENT LABS:  Results " from last 7 days   Lab Units 06/22/21  1005   WBC 10*3/mm3 6.07   NEUTROS ABS 10*3/mm3 4.33   HEMOGLOBIN g/dL 14.9   HEMATOCRIT % 45.2   PLATELETS 10*3/mm3 177     Lab Results   Component Value Date    GLUCOSE 96 06/22/2021    BUN 12 06/22/2021    CREATININE 0.82 06/22/2021    EGFRIFNONA 77 06/22/2021    BCR 14.6 06/22/2021    K 4.4 06/22/2021    CO2 23.5 06/22/2021    CALCIUM 9.3 06/22/2021    ALBUMIN 3.80 06/22/2021    AST 23 06/22/2021    ALT 42 (H) 06/22/2021       Results from last 7 days   Lab Units 06/22/21  1005   SODIUM mmol/L 136   POTASSIUM mmol/L 4.4   CHLORIDE mmol/L 101   CO2 mmol/L 23.5   BUN mg/dL 12   CREATININE mg/dL 0.82   CALCIUM mg/dL 9.3   ALBUMIN g/dL 3.80   BILIRUBIN mg/dL 0.2   ALK PHOS U/L 91   ALT (SGPT) U/L 42*   AST (SGOT) U/L 23   GLUCOSE mg/dL 96           Assessment/Plan      ASSESSMENT:   1.  Rectal cancer, rectal ultrasound examination reported T3N0 disease without lymphadenopathy.   · CT scan of chest, abdomen and pelvis reported no lymphadenopathy in the abdomen, pelvis or chest. She does have small pulmonary nodule 6-7 mm and 2 mm with indeterminate feature. There is no solid evidence of metastatic disease otherwise.   · Based on the CT scan and rectal ultrasound imaging studies, this patient had stage IIA (T3N0M0) disease.    · She had PET scan examination on 8/8/2019 which reported a hypermetabolic right upper lobe pulmonary nodule 6 mm with SUV 5.6.  This is suspicious for metastatic disease however it is too small to be biopsied.  This patient may have stage IV disease.   · She initiated concurrent radiation with continuous 5-FU on 8/12/2019.  · Patient finished concurrent chemoradiation therapy.  · Patient underwent surgical resection of the rectal tumor and diverting loop ileostomy on 12/2/2019 with Dr. Ye.  Pathology evaluation reported residual T3 disease, with 1 out of 14 lymph nodes positive for malignancy.  Certainly this patient has at least stage IIIb rectal  cancer (T3 N1 M0?)  · On 1/22/2020 adjuvant chemotherapy FOLFOX 6 regimen initiated.    · On 2/5/2022 cycle #2 was delayed secondary to neutropenia.  She was given 3 days of Granix.   · Emend added with cycle 3.  With improved nausea control  · Continuing home Granix x3 days following 5-FU disconnect  · 3/11/2020 due for cycle 4, however, she is experiencing progressive abdominal pain and occasional fevers.   · CT scan performed on 3/13/2020 reveals no evidence of progressive or recurrent disease.  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  · Patient hospitalized 4/24-4/26/20 after cycle 5 of chemotherapy with acute UTI.  CT abdomen/pelvis noting fluid collection in the presacral region having diminished in size compared to CT dated 3/13/2020.  Patient was evaluated by Dr. Ye while in the hospital with further plans to evaluate possible colovaginal fistula following completion of chemotherapy.  Patient did respond to IV antibiotics and discharged home on oral cefdinir.  · 4/29/2020 cycle 6 of FOLFOX.  Urinary symptoms have resolved   · Patient seen in the [facility] ED on 5/2/2020 for what was suspected to be an allergic reaction.  She called our service on Saturday explaining that she was experiencing hand and face swelling.  She was instructed to proceed to the emergency department at which time she was assessed and prescribed a Medrol Dosepak.  She had just completed her 5-FU and was unhooked on Friday, 5/1/2020.  Her symptoms have resolved in the office on assessment, 5/5/2020.  · The patient had grade 3 hand-foot syndrome based on symptomology.  Patient had cycle #8 of 5-FU on 5/13/2020. Due to her symptoms and poor tolerance to the 5-FU, her treatment dose will be reduced 50% for oxaliplatin and infusional 5-FU.  Bolus 5-FU will be discontinued..  · On 5/13/2020  We discussed further treatment options pending the scan results.  If she has indeed residual disease or metastatic disease, we will switch her to  irinotecan plus Avastin or anti-EGFR monoclonal antibody.  She will need a further molecular testing of her tumor samples in that case.  · On 5/21/2020 patient had a PET scan and it showed no evidence of of metastatic disease in the neck, chest, abdomen or pelvis.  There was fluid accumulation/abscess.   · On 5/27/2020 I discussed with the patient that we can discontinue chemotherapy at this time.  She will follow-up with Dr. Ye to discuss a possibility to reverse the ileostomy.  We likely will obtain anther PET scan in 3 to 4 months for reassessment.    · Patient was seen by Dr. Ye on 6/19/2019 for evaluation and to discuss possible take down of her ileostomy.  She is scheduled to have a gastrografin enema on 7/2/2020 to evaluate for a fistula, and then a colonoscopy on 7/15/2020, both done by Dr. Ye.  She states that based on the above imaging and procedure findings, she may have a more extensive surgery done or just have her ileostomy reversed, which would likely be done in August 2020.  This will all be coordinated under the care of Dr. Ye.     · I reviewed scan results with the patient on 9/16/2020 for the most recent CT scan from 09/09/2020 and also compared to her previous PET scan examination from 05/21/2020 and also the original PET scan from 08/09/2019.  The original PET scan there is a small right upper lobe pulmonary nodule 6 mm with SUV 5.6. So in the 05/2020 PET scan the nodule is still there but activity seems much less and this most recent CT scan examination also documented the preexisting small pulmonary nodule however there is a new left lower lobe pulmonary nodule 8 mm in size and I shared with the patient today this nodule is suspicious for metastatic disease. Laboratory studies reported minimal CEA level 1.32 on 09/09/2020. Liver function panel was only marginally abnormal with the ALT 45, the remaining is normal. However laboratory study today showed worsening AST 55, ALT 95 and  alkaline phosphatase 143 but is still normal, total bilirubin 0.3.   · So I discussed with the patient on 9/16/2020 recommended to have repeat PET scan examination for assessment because the left lower lobe pulmonary nodule is too small to be biopsied, and if the PET scan reports hypermetabolic lesion, I recommended to have stereotactic radiation therapy. Explained to patient that she is not a really good candidate to have thoracotomy for resection because she still has unhealed wound in the abdomen. I think the stereotactic radiation therapy will be a more feasible choice.  · Laboratory study on 9/16/2020 reported worsening liver function panel with both elevated AST, ALT and also slightly worsened alkaline phosphatase despite having normal total bilirubin. I recommended to repeat laboratory study for reevaluation. The only new medication she has in the past several days is Augmentin but otherwise no change of condition. She denies any recent viral infection. We will monitor this.   · PET scan examination obtained on 9/18/2020 showed metastatic disease.  It reported a few hypermetabolic pulmonary nodules, and some new small pulmonary nodules, all of them highly suspicious for metastatic disease.  She also has hypermetabolic activity in the abdomen and pelvic area which could be related to scar tissue from her recent surgery versus metastatic disease.  Nevertheless overall picture fits with metastatic cancer.  · Discussed with the patient on 9/20/2020, we reviewed the PET scan images together, and I recommended to have systematic chemotherapy, I do not think stereotactic reading therapy to the hypermetabolic lesions in the lungs warranted at this time because even those will be treated with reading therapy, she will still need systematic chemotherapy.  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  I did discuss with the patient using anti-EGFR monoclonal antibody versus  anti-VEGF monoclonal antibody.     · Palliative chemotherapy cycle#1 FOLFIRI was started on 10/13/2020.  No bolus 5-FU given considering previous poor tolerance.    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  Microsatellite stable, mutation burden 5/Mb.   · Discussed with the patient 10/27/2020, because of the [place] mutation, she is not a candidate for anti-EGFR monoclonal antibody such as Erbitux or vectibix.  She could be a candidate for anti-VEGF monoclonal antibody, however because of active wound, she is not a candidate right now at this moment.  · Patient seen in the ED for acute right neck pain on 11/16/2020.  CT angiogram as well as CT of the cervical spine performed with no acute findings but notably one of her pulmonary nodules, left upper lobe, somewhat decreased in size.  Patient given prednisone pack.  Further details below.  · Patient due today for cycle #4 FOLFIRI.  Plan repeat PET scan after cycle 6.  · Last received cycle #5 chemotherapy without irinotecan on 12/8/2020.   · Patient here 12/29/2020 for evaluation and consideration of cycle 6, but she continues to complain of swelling.  She does have swelling typically after treatment as well.  We will hold treatment for 1 week and reevaluate next week.  Still planning on PET scan following cycle 6.   · Cycle #6 chemotherapy will be resumed on 1/5/2021.  Started back on original irinotecan.  · PET scan examination obtained on 1/12/2021 after cycle #6 chemotherapy reported improved pulmonary nodule hypermetabolic activity.  Confirmed these are metastatic lesions.  · Patient is evaluated 1/19/2020, will continue cycle #7 FOLFIRI chemotherapy.  However Avastin will be on hold see next section.   · Patient was reevaluated on 2/2/2021, will continue chemotherapy cycle #8 FOLFIRI.  Avastin / biosimilar will be added.    · [facility] in mid February 2021, and had recommended  palliative chemotherapy without surgical intervention of metastatic lung lesions.   · 2/16/2021cycle #9 FOLFIRI plus bevacizumab.  Tolerated last cycle well with only some mild increase in liquid stools.  This was easily controlled with antidiarrheals.  · On 3/2/2021, patient will proceed with cycle #10 FOLFIRI plus bevacizumab.  After 12 cycles, plan to start maintenance treatment.  · 3/16/2021 cycle 11.  Plus bevacizumab.  · 3/30/2021 cycle 12 FOLFIRI plus bevacizumab.  Having issues with worsening nausea despite premeds with dexamethasone, Aloxi, Emend, and Zofran at home.  Patient is requesting a dose of Phenergan, which is helped her in the past.  We will give this to her with treatment today.  · PET scan examination on 4/6/2021 reported no evidence of hypermetabolic metastatic lesion.  · Discussed with the patient on 4/13/2021, we reviewed the PET scan results.  We recommended to switch to maintenance chemotherapy without irinotecan.  We will continue 5-FU and Avastin every 2 weeks.  We discussed possibility of switching to oral Xeloda treatment.  Discussed with the patient for side effects more so with hand-foot syndrome.  Patient is agreeable.   · Xeloda 2000 mg twice daily 7 days on, 7 days off along with Avastin initiated 4/27/2021.  · After only 5 doses of Xeloda significant hand-foot syndrome, Xeloda held.  Dose adjustment versus transition to 5-FU will be further discussed with Dr. Nguyen  · Patient returns 5/11/2021 for reevaluation.  She did take her first dose of Xeloda 1500 mg this morning, but is actually requesting to be transition back to infusional 5-FU, as she felt that she tolerated infusional 5-FU without any problem.  The patient will receive 5-FU leucovorin and Avastin every 2 weeks.  Xeloda will be discontinued.  · 5/25/2021 continued cycle #4 maintenance 5-FU, leucovorin, Avastin tolerating this much better so far, we will continue this.  · On 6/8/2021, will proceed ahead with cycle #5  maintenance chemotherapy with infusional 5-FU, leucovorin and Avastin.  · 6/22/2021 continues on maintenance chemotherapy with infusional 5-FU, leucovorin, Avastin tolerating well.    2 .  Pulmonary nodule, hypermetabolic on PET scan.   · Her original PET scan examination from 8/8/2019 reported a small 8 mm right upper apex pulmonary nodule but hypermetabolic SUV 5.1.  That was too small to be biopsied, however there was always a possibility of metastatic disease considering that high activity despite a such a small nodule.  · Her chest CT scan examination from 3/13/2020 reported this shrank to 6 mm.    · PET scan examination on 5/21/2020 reported no metabolic activity at this residual nodule.  This needs to be monitored.    · PET scan examination 9/18/2020 reported couple of hypermetabolic pulmonary nodules, besides a few extra small pulmonary nodules.  Those are highly suspicious for metastatic disease.    · PET scan examination obtained on 1/12/2021 after cycle #6 chemotherapy reported improved a pulmonary nodule hypermetabolic activity.  Confirmed these are metastatic lesions.  · 1/19/2021, patient reports she will see thoracic surgeon tomorrow at the [facility] where she seeks second opinion evaluation, and to see whether she would be a candidate for resection of pulmonary nodules.  I discussed with the patient, she had at least 2 hypermetabolic lesions although they responded to chemotherapy, I do not think she would be a candidate for surgery.   · Thoracic surgeon from [facility] recommended metastectomy and to discontinue chemotherapy afterwards.   · Patient had a third opinion evaluation on 2/11/2021 by telemedicine with [facility] physician, who recommended palliative chemotherapy with no surgical intervention for metastatic pulmonary lesions.      3.   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  Low-dose Lovenox discontinued and initiated Xarelto 20 mg daily.  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  · Patient held Xarelto for 2 days prior to MediPort removal from the left chest wall and placement of new port in the right chest wall on 12/18/2020.  She resumed Xarelto that evening.  · Progressive swelling in the neck, face, upper extremities prompting hospitalization with CT scan showing thrombosis in the right subclavian vein and SVC.  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  Although she had been off of Xarelto for a few days, clearly Xarelto was not prohibiting progression of her thrombosis after she resumed treatment and there was some evidence to suggest thrombosis had been present at least in the innominate vein on prior scan in September but now appears chronic.  Current acute thrombosis involving SVC and right subclavian.  · Patient was admitted and placed on heparin drip  · Patient was evaluated by vascular surgery and intervention was not recommended for thrombolysis/thrombectomy.  · On 12/22/2020, the patient developed worsening shortness of breath, increasing upper extremity edema consistent with worsening SVC syndrome.  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  · Symptoms improved and patient was discharged 12/24/2020 on Lovenox 100 mg every 12 hours.    · Returns  12/29/2020 for evaluation continuing Lovenox, overall tolerating it well.  No bleeding issues.  · Improved edema 1/5/2021.  Will continue Lovenox weight-based every 12 hours.  · On 1/19/2021 and 2/2/2021, patient reports improved facial swelling.  She however does have being bruise on her abdomen wall where she does Lovenox injection.  However she does not have a spontaneous epistaxis, gum bleeding or other bleeding.   · On 2/2/2021, we discussed that side effects of Avastin/biosimilar related to thrombosis.  Since this patient already has been on Lovenox, I think the benefits from treatment for her cancer will outweigh that risk of thrombosis, will proceed ahead with Avastin.  I cautioned patient to watch out for signs of worsening thrombosis patient voiced understanding.   · On 3/16/2021, no evidence of worsening DVT, continue Lovenox.  · 5/11/2021 Lovenox dosing will be adjusted due to patient's weight loss.  We discussed she needs 1 mg/kg.  Her syringes are 100 mg syringes she will do 0.9 mL subcu every 12 hours.  · On 6/8/2021, patient continues to have easy bruising at the site of Lovenox injection, otherwise no spontaneous bleeding.      4.  This patient also has strong family history for malignancy the patient had appointment with genetic counseling on September 3, 2019.  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    5.  Mild anemia, improved since her surgery.    · She also has a history of iron deficiency.  Iron studies reveal a saturation of just 10%.  She was started on oral iron but was unable to tolerate due to GI side effects.   · Status post Injectafer 2/5/2020 and 2/12/2020   · Improved hemoglobin 13.5 on 1/5/2021.  · Hemoglobin 14.7 on 2/16/2021.    · Hemoglobin 16.2 on 3/2/2021.    · 3/16/2020 when hemoglobin now up to 16.2, hematocrit 47.6.  Patient admits that she has started smoking again, and I have encouraged her to quit.  She denies any snoring or sleep apnea diagnosis.  Patient is not  taking oral iron.  We will closely monitor.  · 3/30/2020 hemoglobin 15.7, HCT 47.5.  Patient states that she has cut back significantly on her smoking, although not quit yet.  I have encouraged her to continue working on this.  We will continue to closely monitor.  · Hemoglobin 14.6 today on 6/8/2021 at the meantime he has worsening macrocytosis .6.  We will check vitamin B12 and folate.    6.  Peripheral neuropathy secondary to chemotherapy.   · This is mild involving bilateral hands and feet as reported on 9/16/2020.   · Will avoid chemotherapy with oxaliplatin.  · Stable mild neuropathy as of 11/10/2020  · Patient reports worsening peripheral neuropathy and cycle #5 chemotherapy on 12/8/2020 with and without irinotecan.   · On 1/5/2021, patient reports improvement of peripheral neuropathy, will add irinotecan back to chemotherapy.  Continue to monitor and adjust medication.  · On 3/2/2021, patient reports no worsening of peripheral neuropathy after restarting irinotecan.   · As of 6/8/2021 her neuropathy is stable.    7.  History of hand-foot syndrome grade 3.    · It become so significant after cycle #7 FOLFOX treatment.  Discussed with patient on 5/13/2020, will discontinue the bolus 5-FU dose, and also decrease 50% of the infusion dose through the pump.   · We also use Medrol Dosepak for possible recurrent symptomology.  She responded this well.   · Her hand-foot syndrome improved.  · Under current treatment with FOLFIRI, patient not receiving 5-FU bolus.  No recurrent issues.   · Patient will be switched to maintenance chemotherapy using Xeloda in late April 2021.  · Following 5 doses of Xeloda, significant hand-foot syndrome with pain in the feet, significant erythema.  Xeloda held.  Will be further discussed with Dr. Nguyen to determine if the patient will resume 5-FU versus a dose reduction to Xeloda.  · Aggressive emollient moisturizer use twice daily for the past week and patient reports  improvement in the dryness and erythema.  Xeloda will now be discontinued and patient will switch back to 5-FU.  · As of 5/25/2021 dryness and erythema/hyperpigmentation continues to improve.  Xeloda has been discontinued.  · 6/8/2021, patient reports much improved hand-foot syndrome, with remnant pigmentation on palms.    8.  Hyperlacrimation and mild scleral irritation related to 5-FU.  She has been taking steroid eyedrops.  This has improved her hyperlacrimation.  · Not currently an issue.  Continue to monitor with 5-FU.      9.  Abnormal liver function panel.  · Improved liver function panel 9/18/2020.  This is probably related to her recent infection.  · She has normalized AST, alk phosphatase, and a much improved only marginally elevated ALT 35 on 10/13/2020.    · Normalized liver function panel on 10/27/2020.    · Normal liver panel on 11/10/2020.    · Normal liver function panel on 12/5/2021.    · Slightly worsening liver function with AST 33 and ALT 56 1/19/2021.    · Improved AST 21 and ALT 39 on 2/2/2021.    · ALT 59 and AST 29 on 3/2/2021.    · ALT 56, AST 24, alk phosphatase 121 and total bilirubin 0.3 on 4/13/2021.    · 6/8/2021, only marginally elevated ALT 48 otherwise normal liver function panel.  Continue to monitor.      10.  Intermittent leukocytopenia secondary to chemotherapy.   · WBC currently normal at 5220 and the neutrophil 3520 on 1/5/2021.  Continue to monitor.    · On 2/2/2021, WBC 4110 including ANC 2540.  Continue to monitor for now.  Consider G-CSF if neutropenia becomes an ongoing issue.   · 2/16/2021 WBC 4.6, ANC 2.79.    · 3/16/2021 WBC 3.93, ANC 2.26, continue to monitor.  · On 4/13/2021 WBC 4250 including ANC 2640.  · 5/25/2021 WBC 4.21 ANC 2.35.    · 6/22/2021 WBC stable at 6.07, ANC 4.33.    11.  Depression.  Patient seen by JULIET Davenport on 11/9/2020.  She was started on mirtazapine.  Lexapro was discontinued.  This has definitely improved her mood with the patient  feeling overall much better.  She is sleeping better.  Appetite is improved with her actually eating more than she wants to.    · Condition is stable.  She plans to continue follow-up with supportive oncology.    12.  Intermittent upper abdominal discomfort.  This improves with eating.  After further discussion with the patient she also notes 3 nights of increased reflux when laying down.  We discussed her symptoms could be related to increase stomach acid or potential ulcer.  She is currently taking Pepcid 20 mg daily.  I instructed her to add Prilosec 20 mg daily.   · On 3/16/2021 patient reports some worsening reflux symptoms last week.  She has increased her Pepcid to 20 mg twice daily, which did resolve her symptoms.  We discussed she can add Prilosec 20 mg daily as well.   · No complaint today.  Continue to monitor.    13.  Proteinuria: 3/30/2020: Patient is 2+ protein in her urine.  We will continue with Avastin and closely monitor.  No need for 24-hour urine at this time.  · Persistent 2+ proteinuria on 4/13/2021.  continue to monitor.  · 5/25/2021 protein urine only 1+.    · 6/22/2021 persistent 1+ proteinuria.  Continue to monitor.    14.  Tachycardia:   · Patient was seen by Dr. Bustos cardiologist on 4/6/2021.  We will continue to follow-up in 3 months.    15.  Candidiasis:  · Present in the skin fold of the abdomen.  I prescribed nystatin cream    PLAN:  1. Proceed with maintenance chemotherapy with infusional 5-FU, leucovorin, Avastin today.  2. Nystatin cream twice daily to area of skin rash on abdomen.  3. Continue Lovenox 1 mg/kg twice daily.  4. Continue antiemetics if needed at home for nausea.  5. Continue aggressive emollient moisturizers to hands and feet.  6. Return in 2 weeks for follow-up visit with Dr. Nguyen for reevaluation prior to her next maintenance 5-FU, leucovorin, Avastin.      Patient on high risk medication requiring close monitoring for toxicity    Sheba Matias,  APRN  06/22/21      CC:  WAYNE Worley MD Carrie B. Scharf, MD

## 2021-06-24 ENCOUNTER — INFUSION (OUTPATIENT)
Dept: ONCOLOGY | Facility: HOSPITAL | Age: 41
End: 2021-06-24

## 2021-06-24 DIAGNOSIS — Z45.2 ENCOUNTER FOR FITTING AND ADJUSTMENT OF VASCULAR CATHETER: Primary | ICD-10-CM

## 2021-06-24 PROCEDURE — 25010000002 HEPARIN LOCK FLUSH PER 10 UNITS: Performed by: INTERNAL MEDICINE

## 2021-06-24 RX ORDER — SODIUM CHLORIDE 0.9 % (FLUSH) 0.9 %
10 SYRINGE (ML) INJECTION AS NEEDED
Status: CANCELLED | OUTPATIENT
Start: 2021-06-24

## 2021-06-24 RX ORDER — HEPARIN SODIUM (PORCINE) LOCK FLUSH IV SOLN 100 UNIT/ML 100 UNIT/ML
500 SOLUTION INTRAVENOUS AS NEEDED
Status: DISCONTINUED | OUTPATIENT
Start: 2021-06-24 | End: 2021-06-24 | Stop reason: HOSPADM

## 2021-06-24 RX ORDER — HEPARIN SODIUM (PORCINE) LOCK FLUSH IV SOLN 100 UNIT/ML 100 UNIT/ML
500 SOLUTION INTRAVENOUS AS NEEDED
Status: CANCELLED | OUTPATIENT
Start: 2021-06-24

## 2021-06-24 RX ADMIN — Medication 500 UNITS: at 12:00

## 2021-07-06 ENCOUNTER — INFUSION (OUTPATIENT)
Dept: ONCOLOGY | Facility: HOSPITAL | Age: 41
End: 2021-07-06

## 2021-07-06 ENCOUNTER — OFFICE VISIT (OUTPATIENT)
Dept: ONCOLOGY | Facility: CLINIC | Age: 41
End: 2021-07-06

## 2021-07-06 VITALS
TEMPERATURE: 97.3 F | DIASTOLIC BLOOD PRESSURE: 93 MMHG | WEIGHT: 192.2 LBS | OXYGEN SATURATION: 96 % | SYSTOLIC BLOOD PRESSURE: 138 MMHG | HEART RATE: 113 BPM | BODY MASS INDEX: 32.02 KG/M2 | RESPIRATION RATE: 16 BRPM | HEIGHT: 65 IN

## 2021-07-06 VITALS — DIASTOLIC BLOOD PRESSURE: 86 MMHG | SYSTOLIC BLOOD PRESSURE: 127 MMHG

## 2021-07-06 DIAGNOSIS — C78.00 MALIGNANT NEOPLASM METASTATIC TO LUNG, UNSPECIFIED LATERALITY (HCC): ICD-10-CM

## 2021-07-06 DIAGNOSIS — C20 ADENOCARCINOMA OF RECTUM (HCC): Primary | Chronic | ICD-10-CM

## 2021-07-06 DIAGNOSIS — C20 ADENOCARCINOMA OF RECTUM (HCC): Primary | ICD-10-CM

## 2021-07-06 DIAGNOSIS — I26.99 OTHER PULMONARY EMBOLISM WITHOUT ACUTE COR PULMONALE, UNSPECIFIED CHRONICITY (HCC): ICD-10-CM

## 2021-07-06 DIAGNOSIS — T45.1X5A ADVERSE EFFECT OF ANTINEOPLASTIC AND IMMUNOSUPPRESSIVE DRUGS, INITIAL ENCOUNTER: ICD-10-CM

## 2021-07-06 DIAGNOSIS — Z79.01 ANTICOAGULATION ADEQUATE: ICD-10-CM

## 2021-07-06 DIAGNOSIS — D75.89 MACROCYTOSIS WITHOUT ANEMIA: ICD-10-CM

## 2021-07-06 DIAGNOSIS — D68.51 HETEROZYGOUS FACTOR V LEIDEN MUTATION (HCC): Chronic | ICD-10-CM

## 2021-07-06 DIAGNOSIS — C20 ADENOCARCINOMA OF RECTUM (HCC): ICD-10-CM

## 2021-07-06 LAB
ALBUMIN SERPL-MCNC: 3.8 G/DL (ref 3.5–5.2)
ALBUMIN/GLOB SERPL: 1.1 G/DL (ref 1.1–2.4)
ALP SERPL-CCNC: 106 U/L (ref 38–116)
ALT SERPL W P-5'-P-CCNC: 42 U/L (ref 0–33)
ANION GAP SERPL CALCULATED.3IONS-SCNC: 11.3 MMOL/L (ref 5–15)
AST SERPL-CCNC: 27 U/L (ref 0–32)
BASOPHILS # BLD AUTO: 0.02 10*3/MM3 (ref 0–0.2)
BASOPHILS NFR BLD AUTO: 0.4 % (ref 0–1.5)
BILIRUB SERPL-MCNC: 0.4 MG/DL (ref 0.2–1.2)
BILIRUB UR QL STRIP: NEGATIVE
BUN SERPL-MCNC: 11 MG/DL (ref 6–20)
BUN/CREAT SERPL: 12.5 (ref 7.3–30)
CALCIUM SPEC-SCNC: 9.4 MG/DL (ref 8.5–10.2)
CHLORIDE SERPL-SCNC: 101 MMOL/L (ref 98–107)
CLARITY UR: ABNORMAL
CO2 SERPL-SCNC: 23.7 MMOL/L (ref 22–29)
COLOR UR: YELLOW
CREAT SERPL-MCNC: 0.88 MG/DL (ref 0.6–1.1)
DEPRECATED RDW RBC AUTO: 56.1 FL (ref 37–54)
EOSINOPHIL # BLD AUTO: 0.17 10*3/MM3 (ref 0–0.4)
EOSINOPHIL NFR BLD AUTO: 3.3 % (ref 0.3–6.2)
ERYTHROCYTE [DISTWIDTH] IN BLOOD BY AUTOMATED COUNT: 14.5 % (ref 12.3–15.4)
GFR SERPL CREATININE-BSD FRML MDRD: 71 ML/MIN/1.73
GLOBULIN UR ELPH-MCNC: 3.5 GM/DL (ref 1.8–3.5)
GLUCOSE SERPL-MCNC: 136 MG/DL (ref 74–124)
GLUCOSE UR STRIP-MCNC: NEGATIVE MG/DL
HCT VFR BLD AUTO: 47.2 % (ref 34–46.6)
HGB BLD-MCNC: 15.8 G/DL (ref 12–15.9)
HGB UR QL STRIP.AUTO: NEGATIVE
IMM GRANULOCYTES # BLD AUTO: 0.01 10*3/MM3 (ref 0–0.05)
IMM GRANULOCYTES NFR BLD AUTO: 0.2 % (ref 0–0.5)
KETONES UR QL STRIP: NEGATIVE
LEUKOCYTE ESTERASE UR QL STRIP.AUTO: ABNORMAL
LYMPHOCYTES # BLD AUTO: 1.09 10*3/MM3 (ref 0.7–3.1)
LYMPHOCYTES NFR BLD AUTO: 21.3 % (ref 19.6–45.3)
MCH RBC QN AUTO: 35.1 PG (ref 26.6–33)
MCHC RBC AUTO-ENTMCNC: 33.5 G/DL (ref 31.5–35.7)
MCV RBC AUTO: 104.9 FL (ref 79–97)
MONOCYTES # BLD AUTO: 0.38 10*3/MM3 (ref 0.1–0.9)
MONOCYTES NFR BLD AUTO: 7.4 % (ref 5–12)
NEUTROPHILS NFR BLD AUTO: 3.45 10*3/MM3 (ref 1.7–7)
NEUTROPHILS NFR BLD AUTO: 67.4 % (ref 42.7–76)
NITRITE UR QL STRIP: NEGATIVE
NRBC BLD AUTO-RTO: 0 /100 WBC (ref 0–0.2)
PH UR STRIP.AUTO: 6 [PH] (ref 4.5–8)
PLATELET # BLD AUTO: 169 10*3/MM3 (ref 140–450)
PMV BLD AUTO: 9.1 FL (ref 6–12)
POTASSIUM SERPL-SCNC: 3.8 MMOL/L (ref 3.5–4.7)
PROT SERPL-MCNC: 7.3 G/DL (ref 6.3–8)
PROT UR QL STRIP: ABNORMAL
RBC # BLD AUTO: 4.5 10*6/MM3 (ref 3.77–5.28)
SODIUM SERPL-SCNC: 136 MMOL/L (ref 134–145)
SP GR UR STRIP: 1.02 (ref 1–1.03)
UROBILINOGEN UR QL STRIP: ABNORMAL
WBC # BLD AUTO: 5.12 10*3/MM3 (ref 3.4–10.8)

## 2021-07-06 PROCEDURE — 25010000002 PALONOSETRON PER 25 MCG: Performed by: INTERNAL MEDICINE

## 2021-07-06 PROCEDURE — 25010000002 BEVACIZUMAB PER 10 MG: Performed by: INTERNAL MEDICINE

## 2021-07-06 PROCEDURE — 96416 CHEMO PROLONG INFUSE W/PUMP: CPT

## 2021-07-06 PROCEDURE — 25010000002 FOSAPREPITANT PER 1 MG: Performed by: INTERNAL MEDICINE

## 2021-07-06 PROCEDURE — 25010000002 FLUOROURACIL PER 500 MG: Performed by: INTERNAL MEDICINE

## 2021-07-06 PROCEDURE — 96375 TX/PRO/DX INJ NEW DRUG ADDON: CPT

## 2021-07-06 PROCEDURE — 81003 URINALYSIS AUTO W/O SCOPE: CPT

## 2021-07-06 PROCEDURE — 85025 COMPLETE CBC W/AUTO DIFF WBC: CPT

## 2021-07-06 PROCEDURE — 99215 OFFICE O/P EST HI 40 MIN: CPT | Performed by: INTERNAL MEDICINE

## 2021-07-06 PROCEDURE — 96367 TX/PROPH/DG ADDL SEQ IV INF: CPT

## 2021-07-06 PROCEDURE — 25010000002 BEVACIZUMAB 100 MG/4ML SOLUTION 4 ML VIAL: Performed by: INTERNAL MEDICINE

## 2021-07-06 PROCEDURE — 80053 COMPREHEN METABOLIC PANEL: CPT

## 2021-07-06 PROCEDURE — 25010000002 LEUCOVORIN CALCIUM PER 50 MG: Performed by: INTERNAL MEDICINE

## 2021-07-06 PROCEDURE — 96413 CHEMO IV INFUSION 1 HR: CPT

## 2021-07-06 PROCEDURE — 25010000002 DEXAMETHASONE SODIUM PHOSPHATE 100 MG/10ML SOLUTION: Performed by: INTERNAL MEDICINE

## 2021-07-06 RX ORDER — SODIUM CHLORIDE 9 MG/ML
250 INJECTION, SOLUTION INTRAVENOUS ONCE
Status: COMPLETED | OUTPATIENT
Start: 2021-07-06 | End: 2021-07-06

## 2021-07-06 RX ORDER — SODIUM CHLORIDE 9 MG/ML
250 INJECTION, SOLUTION INTRAVENOUS ONCE
Status: CANCELLED | OUTPATIENT
Start: 2021-07-06

## 2021-07-06 RX ORDER — PALONOSETRON 0.05 MG/ML
0.25 INJECTION, SOLUTION INTRAVENOUS ONCE
Status: CANCELLED | OUTPATIENT
Start: 2021-07-06

## 2021-07-06 RX ORDER — PALONOSETRON 0.05 MG/ML
0.25 INJECTION, SOLUTION INTRAVENOUS ONCE
Status: COMPLETED | OUTPATIENT
Start: 2021-07-06 | End: 2021-07-06

## 2021-07-06 RX ADMIN — SODIUM CHLORIDE 250 ML: 9 INJECTION, SOLUTION INTRAVENOUS at 10:14

## 2021-07-06 RX ADMIN — BEVACIZUMAB 900 MG: 400 INJECTION, SOLUTION INTRAVENOUS at 11:06

## 2021-07-06 RX ADMIN — SODIUM CHLORIDE 100 ML: 9 INJECTION, SOLUTION INTRAVENOUS at 10:14

## 2021-07-06 RX ADMIN — DEXAMETHASONE SODIUM PHOSPHATE 12 MG: 10 INJECTION, SOLUTION INTRAMUSCULAR; INTRAVENOUS at 10:46

## 2021-07-06 RX ADMIN — FLUOROURACIL 4850 MG: 50 INJECTION, SOLUTION INTRAVENOUS at 12:33

## 2021-07-06 RX ADMIN — LEUCOVORIN CALCIUM 810 MG: 350 INJECTION, POWDER, LYOPHILIZED, FOR SOLUTION INTRAMUSCULAR; INTRAVENOUS at 11:40

## 2021-07-06 RX ADMIN — PALONOSETRON HYDROCHLORIDE 0.25 MG: 0.25 INJECTION, SOLUTION INTRAVENOUS at 10:14

## 2021-07-07 ENCOUNTER — TELEPHONE (OUTPATIENT)
Dept: ONCOLOGY | Facility: CLINIC | Age: 41
End: 2021-07-07

## 2021-07-07 NOTE — TELEPHONE ENCOUNTER
Spoke with patient to let her know that her b12 was normal but on the lower end and to start vit b 12 1000 mcg daily. She asked what causes her low b12, I let her know that increased size red blood cell count could contribute. She then asked what causes her red blood cells to be increased in size and I let her know one of her diagnosis is macrocytosis. Patient v/u and stated she would start the vit.

## 2021-07-07 NOTE — PROGRESS NOTES
REASON FOR FOLLOW UP:     1. Rectal cancer, rectal ultrasound examination in July 2019 reported T3N0 disease without lymphadenopathy. She does have small pulmonary nodule 6-7 mm and 2 mm with indeterminate feature. There is no solid evidence of metastatic disease otherwise. Patient has stage IIA (T3N0M0) disease.  2. The patient is heterozygous factor V Leiden, was on prophylactic anticoagulation with Lovenox 40 mg daily given her increased risk of thrombosis with MediPort and GI malignancy.   3. PET scan on 8/8/2019 reported an 8 mm hypermetabolic right upper lobe pulmonary nodule, which is suspicious for metastatic as well.    4. Patient had a PowerPort placement on the left upper chest by Dr. Joseph on 8/9/2019.  5. Patient was started on concurrent infusional 5-FU chemoradiation therapy on 8/12/2019, with planned complete surgical resection and further adjuvant chemotherapy with intention to cure the disease.   6. Patient underwent surgical resection of the primary rectal cancer by Dr. Ye on 12/2/2019.  Pathology evaluation reported residual T3N1 disease stage IIIb.  7. Diarrhea related to therapy and radiation.   8. Patient was started cycle 1 day 1 of adjuvant FOLFOX 6 on 1/23/2020.  9. On 2/5/2020 FOLFOX 6 cycle 2 delayed secondary to neutropenia.  Patient was given 3 days of Granix injection.  After cycle #2 we planned 3 days of Granix with each cycle.   10. Patient also intolerant of oral iron.  Patient received 2 doses of IV injectafer on 02/05/2020 and 02/12/2020.   11. 02/12/2020 Proceed with cycle #2 of FOLFOX 6 with 3 days of Granix.  12. On 3/11/2020 cycle 4 postponed secondary to abdominal pain and occasional low-grade fevers.  CT scans ordered.  13. Cycle #4 resumed after CT scan revealed no evidence of disease.  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 Ye. patient has no fever.  Continue to observe.   14. Grade 3  hand-foot syndrome secondary to 5-FU after cycle #7 FOLFOX 6 chemotherapy, prompting ER visit.  Also has worsening peripheral neuropathy.   15. Cycle #8 FOLFOX 6 was given on 5/13/2020, with 50% dose reduction for both 5-FU and oxaliplatin, and also examination of bolus 5-FU.   16. PET scan examination on 5/21/2020 reported no evidence of metastatic disease in the chest abdomen pelvis.  Stable 6 mm RUL pulmonary nodule.  17. On 5/27/2020, I discussed with the patient that we can discontinue chemotherapy at this time.   18. Patient had a surgical procedure for low anterior colon resection, coloanal anastomosis on 8/3/2020.  19. CT scan for chest abdomen pelvis on 9/9/2020 reported a new 8 mm noncalcified pulmonary nodule in the left lower lobe of the lung.  Otherwise stable right upper lobe 6 mm pulmonary nodule, and no evidence of disease recurrence in the abdomen or pelvis.  20. PET scan examination on 9/18/2020 reported multiple hypermetabolic small pulmonary nodules/ new pulmonary nodules.   21. Patient was started on pelvic chemotherapy with FOLFIRI regimen on 10/13/2020.   22. 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 It reported tumor mutation burden 5 Muts/Mb, microsatellite stable, TP53 mutation R282W, and others.   23. Cycle #5 was without irinotecan, due to peripheral neuropathy.  24. 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  Patient was switched to weight-based Lovenox injection.  25. Cycle #6 5-FU and irinotecan was delayed by 2 weeks because of the above incident.  26. Patient had a telemedicine evaluation at that the [facility] [place] in February 2021.  They agreed with our treatment plan for palliative chemotherapy followed by maintenance chemotherapy.    27. PET scan examination on 4/6/2021 after cycle #12 palliative chemotherapy reported no  evidence of hypermetabolic metastatic lesion.   28. Patient was started on maintenance chemotherapy with 5-FU and Avastin on 4/13/2021. (Unable to tolerate Xeloda because of a significant hand-foot syndrome).       HISTORY OF PRESENT ILLNESS:  The patient is a 40 y.o. female the above-mentioned history who is here today for lab review and evaluation prior to her maintenance chemotherapy cycle #7 with 5-FU, leucovorin, Avastin.     She reports she does have some recall of hand-foot syndrome recently, despite on 5-FU infusion therapy, and it is better now since she has been taking care of with urea cream and aggressive moisturization.      Overall she has been tolerating infusional 5-FU much better than Xeloda.  She has no nausea vomiting.  She continues have a stool from the ileostomy.  She denies blood in the stool.  Denies fever sweating chills.      She continues Eliquis anticoagulation, she does have some bruising at the site of injection she reports no spontaneous bleeding such as epistaxis or gum bleeding.      Otherwise no significant issues with nausea, vomiting.  Her ostomy is functioning well.  She does continue on Lovenox and denies bleeding issues.      Her initial blood pressure was slightly high at 138/93, she reports no headaches no vision changes.  however at time of chemotherapy it improved at 127/86.    Laboratory study today on 7/6/2021 showed hemoglobin 15.8, hematocrit of 47.2, platelets 169,000 and WBC 5120 including ANC 3415.  Urinalysis reported trace protein.  Chemistry lab reported unremarkable CMP except glucose 136.      Past Medical History:   Diagnosis Date   • Abdominopelvic abscess (CMS/HCC) 08/12/2020    ADMITTED TO [facility]   • Abnormal Pap smear of cervix 02/02/1998    JULIUS I   • Anemia in pregnancy    • Anxiety    • Bilateral epiphora 04/2020   • Bilateral hand swelling 05/02/2020    SEEN AT [facility] ER   • Cervical lymphadenitis 06/06/2012    SEEN AT [facility] ER   • Chemotherapy induced  neutropenia (CMS/HCC) 2020   • Chemotherapy-induced nausea 2020   • Chemotherapy-induced thrombocytopenia 2020   • Chronic diarrhea    • Colon polyps     FOLLOWED BY DR. GERONIMO ESPARZA   • Cystitis 2020    WITH DEHYDRATION, ADMITTED TO [facility]   • Cystitis 10/27/2020   • Drug-induced peripheral neuropathy (CMS/[facility]) 2020   • Fistula of intestine    • GERD (gastroesophageal reflux disease)    • Hand foot syndrome 2020   • Heart murmur     IN CHILDHOOD   • Hematochezia    • Hemorrhoids    • Heterozygous factor V Leiden mutation (CMS/[facility])     DX 2019   • History of anemia    • History of chemotherapy     FOLLOWED BY DR. ALEXANDRU HUNT   • History of gestational diabetes    • History of pre-eclampsia    • History of radiation therapy     FOLLOWED BY DR. JAVON LEWIS   • History of TB skin testing 2009    TB Skin Test   • HPV in female 1998   • Hypokalemia 2019   • Hypomagnesemia 2019   • IBS (irritable bowel syndrome)    • Ileostomy in place (CMS/[facility])     FOLLOWED BY DR. GERONIMO ESPARZA   • Infected insect bite of neck 2016    SEEN AT [facility]   • Kidney stone    • Kidney stones 2007    SEEN AT [facility] ER   • Lump of right breast     SWOLLEN LYMPH NODE   • Lung cancer (CMS/[facility]) 2020    METASTATIC LUNG CANCER   • Monopolar depression (CMS/[facility])    • On anticoagulant therapy    • Perirectal abscess 2020   • PIH (pregnancy induced hypertension)    • Pulmonary embolism without acute cor pulmonale (CMS/[facility]) 09/20/2019    X 3; ADMITTED TO [facility]   • Pulmonary nodule, right 2020   • Rectal cancer (CMS/HCC) 2019    STAGE IIA, INVASIVE MODERATELY DIFFERENTIATED ADENOCARCINOMA, CHEMO AND XRT FINISHED 2019   • Right shoulder pain 2020    SEEN AT [facility] ER   • Right ureteral stone 10/01/2019    SEEN AT [facility] ER   • SAB (spontaneous ) 1996     A2-1 INDUCED   • Sciatica    • Sepsis due to cellulitis (CMS/[facility]) 2002    LEFT GREAT TOE, ADMITTED  TO [facility]   • Tachycardia 04/24/2020   • Urinary urgency 03/2020     Past Surgical History:   Procedure Laterality Date   • CHOLECYSTECTOMY N/A 10/10/2003    LAPAROSCOPIC WITH CHOLANGIOGRAM, DR. JAMEY TALAVERA AT [facility]   • COLON RESECTION N/A 12/2/2019    Procedure: laparoscopic low anterior colon resection with mobilization of splenic flexure and diverting loop ileostomy: ERAS;  Surgeon: Padmaja Esparza MD;  Location: [facility] MAIN OR;  Service: General   • COLON RESECTION N/A 8/3/2020    Procedure: LOW ANTERIOR COLON RESECTION, COLOANAL ANASTOMOSIS, MOBILIZATION SPLENIC FLEXURE;  Surgeon: Padmaja Esparza MD;  Location: [facility] MAIN OR;  Service: General;  Laterality: N/A;   • COLONOSCOPY N/A 7/15/2020    PATENT ANASTAMOSIS IN MID RECTUM, RESCOPE IN 1 YR, DR. PADMAJA ESPARZA AT [facility]   • COLONOSCOPY W/ POLYPECTOMY N/A 07/08/2019    15 MM TUBULOVILLOUS ADENOMA POLYP IN HEPATIC FLEXURE, 20 MMTUBULOVILLOUS ADENOMA WITH HIGH GRADE DYSPLASIA IN RECTOSIGMOID, 4 CM MASS IN MID RECTUM, PATH: INVASIVE MODERATELY DIFFERENTIATED ADENOCARCINOMA, DR. JENNIFER LI AT [facility]   • DILATATION AND EVACUATION N/A 2009   • ENDOSCOPY N/A 07/08/2019    LA GRADE A ESOPHAGITIS, GASTRITIS, ALL BIOPSIES BENIGN, DR. JENNIFER LI AT [facility]   • INCISION AND DRAINAGE PERIRECTAL ABSCESS N/A 8/14/2020    Procedure: INCISION AND DRAINAGE OF retrorectal dehiscence abcess with drain placement and irrigation;  Surgeon: Padmaja Esparza MD;  Location: [facility] MAIN OR;  Service: General;  Laterality: N/A;   • PAP SMEAR N/A 01/24/2014   • SIGMOIDOSCOPY N/A 7/24/2019    INFILTRATIVE PARTIALLY OBSTRUCTING LARGE RECTAL CANCER, AREA TATOOED, DR. PADMAJA ESPARZA AT [facility]   • SIGMOIDOSCOPY N/A 11/23/2019    AN ULCERATED PARTIALLY OBSTRUCTING MASS IN MID RECTUM, AREA TATTOOED, DR. PADMAJA ESPARZA AT [facility]   • TRANSRECTAL ULTRASOUND N/A 7/24/2019    Procedure: ULTRASOUND TRANSRECTAL;  Surgeon: Padmaja Esparza MD;  Location: [facility] ENDOSCOPY;  Service:  General   • URETEROSCOPY LASER LITHOTRIPSY WITH STENT INSERTION Right 10/30/2019    DR. ESTUARDO BEASLEY AT [facility]   • VAGINAL DELIVERY N/A 09/18/1998   • VENOUS ACCESS DEVICE (PORT) INSERTION Left 8/9/2019    Procedure: INSERTION VENOUS ACCESS DEVICE;  Surgeon: Sj Joseph MD;  [place] [facility] [place] [facility] [place]  Service: General   • VENOUS ACCESS DEVICE (PORT) INSERTION N/A 12/18/2020    Procedure: INSERTION VENOUS ACCESS DEVICE right side, removal venous access device left side;  Surgeon: Sj Joseph MD;  [place] [facility] [place] [facility] [place]  Service: General;  Laterality: N/A;   • WISDOM TOOTH EXTRACTION Bilateral 1993       HEMATOLOGIC/ONCOLOGIC HISTORY:      The patient reports she has intermittent rectal bleeding and urgency, mucous with her stool, starting sometime in 2016. At that time she was referred to GI service, and was diagnosed of irritable bowel syndrome. Nevertheless she had worsening urgency for bowel movement, and had a couple of incidents losing control of stool. She was recently seen by Roland Thorpe MD again and had colonoscopy and EGD exam on 07/08/2019. She was found having a circumferential rectal mass. According to the procedure note, the patient had a fungating circumferential bleeding 4 cm mass in the middle rectum, at distance between 13 cm and 17 cm from the anus. Mass was causing partial obstruction. There were also colon polyps found at the hepatic flexure and also at the rectosigmoid junction 23 cm from the anus. Both were resected and retrieved. EGD examination reported grade A esophagitis at the GE junction and patchy discontinuous erythema and aggravation of the mucosa without bleeding in the stomach body. There is normal mucosa of the duodenum. Pathology evaluation from this procedure reported moderately differentiated adenocarcinoma involving the rectal mass. The rectal sigmoid junction polyp was tubular/tubulovillous adenoma with high grade dysplasia negative for invasive malignancy. The  hepatic flexure polyp was also tubular/tubulovillous adenoma negative for high grade dysplasia or malignancy. The biopsy from the esophagus reports squamocolumnar mucosa with inflammatory changes suggestive of mild reflux esophagitis, negative for interstitial metaplasia. Gastric biopsy was benign and duodenal biopsy was also benign. There is no mention of H-pylori.     Patient was subsequently referred to colorectal surgeon Padmaja Ye MD for further evaluation. The patient had CT scan examination for chest, abdomen and pelvis requested by Dr. Ye and were done on 07/13/2019. The study reported no evidence of lymphadenopathy in the abdomen and pelvis. There was wall thickening of the rectosigmoid junction. Normal spleen, pancreas, adrenal glands and kidneys. There was fatty liver infiltration but no focal lymphatic lesions. There was a small 6-7 mm noncalcified nodule in the right upper lobe and a tiny 3 mm subpleural nodule in the right middle lobe. No mediastinal or hilar lymphadenopathy.     Dr. Ye performed staging rectal ultrasound examination. This study reported tumor penetrating through the muscularis propria as T3 disease; there were no lymph nodes identified.    She had a hospitalization in late September 2019 because of newly discovered pulmonary emboli.  She was on prophylactic Lovenox prior to the incident of PE.  Now she is on full dose Xarelto anticoagulation.  Patient reports no further chest pain dyspnea.  She denies bleeding or bruising.  During her hospitalization, she was seen by Dr. Ye, who plans to have surgery 8 to 12 weeks after finishing radiation therapy.  She finished her radiation on 9/19/2019.     I noticed patient also presented to the emergency room on 10/1/2019 complaining of right flank area pain.  I reviewed the images studies and indeed she has a very small 1 to 2 mm obstructing kidney stone in the UV junction.  Patient is still symptomatic with some pains and dysuria.   She denies fever sweating or chills.    Laboratory study on 10/7/2019 reported normal CBC including hemoglobin 13.1, platelets 301,000, WBC 6170 and ANC 4900 lymphocytes 590 monocytes 480.      The nurse reported malfunction of port-a-catheter on 10/7/2019.  Port study on 10/8/2019 showed fibrin sheath around the distal aspect of the Mediport catheter in the SVC. This does not appear to hinder injection, but does prevent aspiration at this time.    Patient underwent surgical resection of the colon on 12/2/2019 with Dr. Ye.  Pathology evaluation reported residual T3 disease, also 1 out of 14 lymph nodes positive for malignancy.  So this patient in either had at least stage IIIb disease (T3 N1 M0?).      Adjuvant chemotherapy FOLFOX 6 will be started on 1/22/2020.  Laboratory study reported iron saturation 10%, free iron 31 TIBC 319 and ferritin 168.  Her hemoglobin was 11.8, WBC 5600, and platelets 347,000.    Patient was here on 02/12/2020 for cycle 2 of her FOLFOX.  This is delayed x1 week secondary to neutropenia.  The patient ultimately received 3 days worth of Neupogen with recovery of her blood counts.  Of note, the patient struggled with significant nausea without any episodes of vomiting following cycle #1 of chemotherapy resulting in significant weight loss.  She is up 12 pounds over the last week as her appetite has normalized.  We will add Emend to her care plan.    She is having several loose stools per her colostomy and has been hesitant to take Imodium due to prior history of constipation.  I encouraged her to try this with a maximum of 8 tablets/day.  She denies any infectious symptoms including fevers or chills.  No excess bleeding or bruising noted.  She had the expected cold sensitivity related to the Oxaliplatin for about 3 days following treatment.    Labs from 02/12/2020 demonstrated total white blood cell count of 5.14, ANC of 3.06, hemoglobin of 11.2, platelets of 211,000.  She was found to  be iron deficient last week and is intolerant to oral iron secondary to GI distress.  For this reason, she initiated IV iron therapy with Injectafer last week.  She had received her second dose 02/12/2020    Patient has normalized hemoglobin 12.2 on 2/26/2020.    She reported on 5/5/2020 she had a recent visit to the emergency department for what was suspected to be an allergic reaction.  She called our on-call service on Saturday with reports of hand and face swelling.  She was instructed to proceed to the emergency department at which time she was assessed and prescribed a Medrol Dosepak.  She had just completed her 5-FU and was unhooked on Friday, 5/3/2020.  Her symptoms have improved.  She does report persistent hyperpigmentation and mild swelling of the palms of the hands but this is much improved.  It was her right hand specifically that was swollen.  Her facial swelling has resolved.  She continues on Cefdinir nd since has 1 day remaining, she was prescribed cefdinir for a UTI requiring hospitalization at the end of April.  Reports no new symptoms.  Her labs are stable.  She is scheduled for treatment again.    Patient states at this time she is not tolerating her chemotherapy well.     Patient seen in the emergency department on 5/2/2020 for what was suspected to be an allergic reaction.  She called our on-call service on Saturday explaining that she was experiencing hand and face swelling.  She was instructed to proceed to the emergency department at which time she was assessed and prescribed a Medrol Dosepak.  She had just completed her 5-FU and was unhooked on Friday, 5/1/2020.      She reports since her ED visit on 5/2/2020 her symptoms have not improved. Her hands and feet were swollen upon presenting to the ED and she could barely make a fist. She states that she feels so swollen she is not able to stand it. She states that her skin on the hands and feet are peeling extensively as well, besides  changing color to more dark.     She also reports significant fatigue after her first week of the 5-FU treatment but she expected this side effect. She also notices significant increase in her neuropathy to the point that she is not able to even walk around in her home without her house slippers due to irritation from her rugs. She denies and associated nausea or vomiting at this time. She also has episodes of epistaxis every day after the chemotherapy cycle #7.  She does report working full-time during the week of chemotherapy.    Laboratory studies, 5/13/2020, show moderate thrombocytopenia platelets 123,000, low normal WBC 4140 including ANC 2720 and normal hemoglobin 13.4.  Because significant hand-foot syndrome, will decrease both 5-FU and oxaliplatin by 50%, and eliminate bolus dose of 5-FU.    On 5/21/2020 patient had a PET scan performed which showed no convincing evidence of residual disease in abdomen or pelvis, no metastatic disease within the chest or neck.     Cycle #8 FOLFOX 6 was given on 5/13/2020, with 50% dose reduction for both 5-FU and oxaliplatin, and also examination of bolus 5-FU.  She states on 5/27/2020 that with the recent reduction of the chemotherapy she feels significantly better. She has more energy and is able to do her daily routine.      PET scan examination on 5/21/2020 reported no evidence of metastatic disease in chest abdomen pelvis.  There was a stable 6 mm right upper lobe pulmonary nodule.    Laboratory studies on 5/27/2020 showed mild leukocytopenia WBC 3720 but a normal ANC 2250 and lymphocytes 630.  Normal platelets 163,000 and hemoglobin 12.6.  Chemistry lab reported normal renal function, liver function panel and a electrolytes, elevated glucose 164.    Laboratory studies 6/24/2020, showed normal hemoglobin 13.4 but macrocytosis .9.  Normal platelets 210,000 and WBC 5870.  She had normal CMP.     Patient last time was here in late June 2020.  Since that time she  had surgical procedure for low anterior colon resection, coloanal anastomosis on 8/3/2020.  She later developed a perirectal abscess, required surgical drainage on 8/14/2020.  She was discharged on 8/27/2020.    Patient reports to me she still has lower abdominal wall vacuum suction in place.  She denies fever sweating chills.  Performance status is ECOG 1.  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  She does have visiting nurse come to the home for wound care.    CT scan for chest abdomen pelvis on 9/9/2020 reported a new 8 mm noncalcified pulmonary nodule in the left lower lobe.  Otherwise stable right upper lobe 6 mm pulmonary nodule, and no evidence of disease recurrence in the abdomen or pelvis.     Laboratory study on 9/16/2020 reported elevated AST 55, ALT 95, alk phosphatase 143 but normal total bilirubin 0.3.  Chemistry lab otherwise unremarkable.  She has completed normal CBC.      Because of the abnormal CT scan examination for chest abdomen pelvis on 9/9/2020 reported a new 8 mm noncalcified pulmonary nodule in the left lower lobe, we obtained a PET scan examination for further evaluation, which was done on 9/18/2020.  This study reported several pulmonary nodules, some of them are hypermetabolic, newly developed compared to previous PET scan in May 2020.  Certainly does highly suspicious for metastatic disease.  There are also hypermetabolic activity in the abdomen and pelvic area which is hard to differentiate from surgical scar versus metastatic malignancy.    Laboratory study on 10/13/2020 reported normal CBC with Hb 13.9, platelets 302,000 and WBC 5520 including ANC 3830.  Chemistry lab reported normalized AST 20, alk phosphatase 116 improved ALT 35, and maintains normal bilirubin, with normal electrolytes and liver function panel.     Patient was started on first cycle of palliative chemotherapy FOLFIRI on 10/13/2020.    She recently had hospitalization from 12/21/2020  through 12/24/2020 with a new thrombus of the superior vena cava which developed after a new PowerPort catheter placement while the patient was on Xarelto.  Patient had a new port placed 12/18/2020, and had held her Xarelto for 2 days prior to surgery.  She presented to the ER on 12/21/20 with complaints of facial and arm swelling for 3 days.  She also noted increased shortness of breath.  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  Thrombus within the right internal jugular vein cannot be excluded.  Patient was started on IV heparin, and eventually transitioned to weight-based Lovenox, which she now continues.      MEDICATIONS    Current Outpatient Medications:   •  clonazePAM (KlonoPIN) 1 MG tablet, TAKE 1 TABLET BY MOUTH THREE TIMES A DAY AS NEEDED FOR ANXIETY OR SEIZURES, Disp: 90 tablet, Rfl: 0  •  dicyclomine (BENTYL) 20 MG tablet, TAKE 1 TABLET BY MOUTH EVERY 6 HOURS AS NEEDED, Disp: 360 tablet, Rfl: 0  •  diphenoxylate-atropine (LOMOTIL) 2.5-0.025 MG per tablet, TAKE 1 TABLET BY MOUTH 4 (FOUR) TIMES A DAY AS NEEDED FOR DIARRHEA., Disp: 120 tablet, Rfl: 1  •  enoxaparin (Lovenox) 100 MG/ML solution syringe, Inject 1 mL under the skin into the appropriate area as directed Every 12 (Twelve) Hours., Disp: 60 mL, Rfl: 2  •  escitalopram (LEXAPRO) 10 MG tablet, TAKE 1 TABLET BY MOUTH EVERY DAY, Disp: 30 tablet, Rfl: 5  •  famotidine (PEPCID) 20 MG tablet, TAKE 1 TABLET BY MOUTH TWICE A DAY, Disp: 180 tablet, Rfl: 1  •  Loratadine (CLARITIN) 10 MG capsule, Take 10 mg by mouth Every Evening., Disp: , Rfl:   •  mineral oil-hydrophilic petrolatum (AQUAPHOR) ointment, Apply 1 application topically to the appropriate area as directed As Needed for Dry Skin., Disp: , Rfl:   •  nystatin (MYCOSTATIN) 214337 UNIT/GM cream, Apply  topically to the appropriate area as directed 2 (Two) Times a Day As Needed (rash)., Disp: 30 g, Rfl: 2  •  ondansetron (ZOFRAN) 8 MG tablet, Take 1 tablet by mouth  3 (Three) Times a Day As Needed for Nausea or Vomiting., Disp: 60 tablet, Rfl: 5  •  prochlorperazine (COMPAZINE) 10 MG tablet, Take 1 tablet by mouth Every 6 (Six) Hours As Needed for Nausea or Vomiting., Disp: 30 tablet, Rfl: 2  •  promethazine (PHENERGAN) 25 MG tablet, Take 1 tablet by mouth Every 6 (Six) Hours As Needed for Nausea or Vomiting., Disp: 60 tablet, Rfl: 2  No current facility-administered medications for this visit.    ALLERGIES:   No Known Allergies    SOCIAL HISTORY:       Social History     Tobacco Use   • Smoking status: Current Every Day Smoker     Packs/day: 0.50     Years: 24.00     Pack years: 12.00     Types: Electronic Cigarette, Cigarettes   • Smokeless tobacco: Never Used   Vaping Use   • Vaping Use: Some days   • Substances: Nicotine   • Devices: Disposable   Substance Use Topics   • Alcohol use: Not Currently     Comment: Caffeine use rare   • Drug use: No         FAMILY HISTORY:   Mother has positive factor V Leiden mutation, although she did not have thrombosis, mother also is coronary disease with stenting, she also is occasional bruising.  Maternal grandmother had DVT, she had multiple surgical procedures.  Patient mother's half-brother had metastatic colon cancer at diagnosis in his 50s.  Maternal great aunt has breast cancer.  Patient will follow his skin cancer in his 60s, exclusive.    Review of Systems   Constitutional: Negative for activity change, appetite change, chills, diaphoresis, fatigue, fever and unexpected weight change.        She works full-time in a dental clinic.   HENT: Negative for mouth sores and sore throat.    Eyes: Negative for visual disturbance.   Respiratory: Negative for cough and shortness of breath.    Cardiovascular: Negative for chest pain and leg swelling.   Gastrointestinal: Negative for abdominal distention, abdominal pain, blood in stool and nausea.   Endocrine: Negative for cold intolerance.   Genitourinary: Negative for dysuria and hematuria.  "  Musculoskeletal: Negative for arthralgias and joint swelling.   Skin: Positive for color change (Hand-foot syndrome, improved). Negative for rash (This has resolved).        Hand-foot syndrome, mild improvement with aggressive moisturizing and also urea cream.   Allergic/Immunologic: Positive for immunocompromised state (Chemotherapy).   Neurological: Negative for dizziness and light-headedness.   Hematological: Negative for adenopathy. Bruises/bleeds easily (Easy bruising at the site of injection of Lovenox ).   Psychiatric/Behavioral: Negative for agitation and confusion.       07/06/2021 ROS unchanged from above except as updated.       Vitals:    07/06/21 0857   BP: 138/93   Pulse: 113   Resp: 16   Temp: 97.3 °F (36.3 °C)   TempSrc: Temporal   SpO2: 96%   Weight: 87.2 kg (192 lb 3.2 oz)   Height: 166 cm (65.35\")   PainSc: 0-No pain     Current Status 6/8/2021   ECOG score 0     Physical Exam  Vitals reviewed.   Constitutional:       General: She is not in acute distress.     Appearance: Normal appearance. She is well-developed. She is not ill-appearing.   HENT:      Head: Normocephalic and atraumatic.   Eyes:      General: No scleral icterus.     Conjunctiva/sclera: Conjunctivae normal.   Cardiovascular:      Rate and Rhythm: Normal rate and regular rhythm.      Heart sounds: Normal heart sounds.   Pulmonary:      Effort: Pulmonary effort is normal. No respiratory distress.      Breath sounds: Normal breath sounds. No wheezing or rhonchi.   Abdominal:      General: Bowel sounds are normal. There is no distension.      Palpations: Abdomen is soft. There is no mass.      Tenderness: There is no abdominal tenderness.      Comments: Ostomy in place with liquid brown stool.  Scattered bruises and noduled on the abdomen bilaterally from Lovenox injections.     Musculoskeletal:      Cervical back: Neck supple.      Right lower leg: No edema.      Left lower leg: No edema.   Lymphadenopathy:      Cervical: No cervical " adenopathy.   Skin:     General: Skin is warm and dry.      Coloration: Skin is not jaundiced.      Findings: Bruising present. No rash.      Comments: Much improved hyperpigmentation of the palms and feet, with dryness.      Neurological:      Mental Status: She is alert and oriented to person, place, and time. Mental status is at baseline.      Cranial Nerves: No cranial nerve deficit.   Psychiatric:         Mood and Affect: Mood normal.         Thought Content: Thought content normal.     07/06/2021 physical exam is unchanged from above except as updated.    RECENT LABS:  Results from last 7 days   Lab Units 07/06/21  0843   WBC 10*3/mm3 5.12   NEUTROS ABS 10*3/mm3 3.45   HEMOGLOBIN g/dL 15.8   HEMATOCRIT % 47.2*   PLATELETS 10*3/mm3 169       Results from last 7 days   Lab Units 07/06/21  0843   SODIUM mmol/L 136   POTASSIUM mmol/L 3.8   CHLORIDE mmol/L 101   CO2 mmol/L 23.7   BUN mg/dL 11   CREATININE mg/dL 0.88   CALCIUM mg/dL 9.4   ALBUMIN g/dL 3.80   BILIRUBIN mg/dL 0.4   ALK PHOS U/L 106   ALT (SGPT) U/L 42*   AST (SGOT) U/L 27   GLUCOSE mg/dL 136*           Assessment/Plan      ASSESSMENT:   1.  Rectal cancer, rectal ultrasound examination reported T3N0 disease without lymphadenopathy.   · CT scan of chest, abdomen and pelvis reported no lymphadenopathy in the abdomen, pelvis or chest. She does have small pulmonary nodule 6-7 mm and 2 mm with indeterminate feature. There is no solid evidence of metastatic disease otherwise.   · Based on the CT scan and rectal ultrasound imaging studies, this patient had stage IIA (T3N0M0) disease.   · She had PET scan examination on 8/8/2019 which reported a hypermetabolic right upper lobe pulmonary nodule 6 mm with SUV 5.6.  This is suspicious for metastatic disease however it is too small to be biopsied.  This patient may have stage IV disease.   · She initiated concurrent radiation with continuous 5-FU on 8/12/2019.  · Patient finished concurrent chemoradiation  therapy.  · Patient underwent surgical resection of the rectal tumor and diverting loop ileostomy on 12/2/2019 with Dr. Ye.  Pathology evaluation reported residual T3 disease, with 1 out of 14 lymph nodes positive for malignancy.  Certainly this patient has at least stage IIIb rectal cancer (T3 N1 M0?)  · On 1/22/2020 adjuvant chemotherapy FOLFOX 6 regimen initiated.    · On 2/5/2022 cycle #2 was delayed secondary to neutropenia.  She was given 3 days of Granix.   · Emend added with cycle 3.  With improved nausea control  · Continuing home Granix x3 days following 5-FU disconnect  · 3/11/2020 due for cycle 4, however, she is experiencing progressive abdominal pain and occasional fevers.   · CT scan performed on 3/13/2020 reveals no evidence of progressive or recurrent disease.  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  · Patient hospitalized 4/24-4/26/20 after cycle 5 of chemotherapy with acute UTI.  CT abdomen/pelvis noting fluid collection in the presacral region having diminished in size compared to CT dated 3/13/2020.  Patient was evaluated by Dr. Ye while in the hospital with further plans to evaluate possible colovaginal fistula following completion of chemotherapy.  Patient did respond to IV antibiotics and discharged home on oral cefdinir.  · 4/29/2020 cycle 6 of FOLFOX.  Urinary symptoms have resolved   · Patient seen in the [facility] ED on 5/2/2020 for what was suspected to be an allergic reaction.  She called our service on Saturday explaining that she was experiencing hand and face swelling.  She was instructed to proceed to the emergency department at which time she was assessed and prescribed a Medrol Dosepak.  She had just completed her 5-FU and was unhooked on Friday, 5/1/2020.  Her symptoms have resolved in the office on assessment, 5/5/2020.  · The patient had grade 3 hand-foot syndrome based on symptomology.  Patient had cycle #8 of 5-FU on 5/13/2020. Due to her symptoms and poor  tolerance to the 5-FU, her treatment dose will be reduced 50% for oxaliplatin and infusional 5-FU.  Bolus 5-FU will be discontinued..  · On 5/13/2020  We discussed further treatment options pending the scan results.  If she has indeed residual disease or metastatic disease, we will switch her to irinotecan plus Avastin or anti-EGFR monoclonal antibody.  She will need a further molecular testing of her tumor samples in that case.  · On 5/21/2020 patient had a PET scan and it showed no evidence of of metastatic disease in the neck, chest, abdomen or pelvis.  There was fluid accumulation/abscess.   · On 5/27/2020 I discussed with the patient that we can discontinue chemotherapy at this time.  She will follow-up with Dr. Ye to discuss a possibility to reverse the ileostomy.  We likely will obtain anther PET scan in 3 to 4 months for reassessment.    · Patient was seen by Dr. Ye on 6/19/2019 for evaluation and to discuss possible take down of her ileostomy.  She is scheduled to have a gastrografin enema on 7/2/2020 to evaluate for a fistula, and then a colonoscopy on 7/15/2020, both done by Dr. Ye.  She states that based on the above imaging and procedure findings, she may have a more extensive surgery done or just have her ileostomy reversed, which would likely be done in August 2020.  This will all be coordinated under the care of Dr. Ye.     · I reviewed scan results with the patient on 9/16/2020 for the most recent CT scan from 09/09/2020 and also compared to her previous PET scan examination from 05/21/2020 and also the original PET scan from 08/09/2019.  The original PET scan there is a small right upper lobe pulmonary nodule 6 mm with SUV 5.6. So in the 05/2020 PET scan the nodule is still there but activity seems much less and this most recent CT scan examination also documented the preexisting small pulmonary nodule however there is a new left lower lobe pulmonary nodule 8 mm in size and I shared  with the patient today this nodule is suspicious for metastatic disease. Laboratory studies reported minimal CEA level 1.32 on 09/09/2020. Liver function panel was only marginally abnormal with the ALT 45, the remaining is normal. However laboratory study today showed worsening AST 55, ALT 95 and alkaline phosphatase 143 but is still normal, total bilirubin 0.3.   · So I discussed with the patient on 9/16/2020 recommended to have repeat PET scan examination for assessment because the left lower lobe pulmonary nodule is too small to be biopsied, and if the PET scan reports hypermetabolic lesion, I recommended to have stereotactic radiation therapy. Explained to patient that she is not a really good candidate to have thoracotomy for resection because she still has unhealed wound in the abdomen. I think the stereotactic radiation therapy will be a more feasible choice.  · Laboratory study on 9/16/2020 reported worsening liver function panel with both elevated AST, ALT and also slightly worsened alkaline phosphatase despite having normal total bilirubin. I recommended to repeat laboratory study for reevaluation. The only new medication she has in the past several days is Augmentin but otherwise no change of condition. She denies any recent viral infection. We will monitor this.   · PET scan examination obtained on 9/18/2020 showed metastatic disease.  It reported a few hypermetabolic pulmonary nodules, and some new small pulmonary nodules, all of them highly suspicious for metastatic disease.  She also has hypermetabolic activity in the abdomen and pelvic area which could be related to scar tissue from her recent surgery versus metastatic disease.  Nevertheless overall picture fits with metastatic cancer.  · Discussed with the patient on 9/20/2020, we reviewed the PET scan images together, and I recommended to have systematic chemotherapy, I do not think stereotactic reading therapy to the hypermetabolic lesions in the  lungs warranted at this time because even those will be treated with reading therapy, she will still need systematic chemotherapy.  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  I did discuss with the patient using anti-EGFR monoclonal antibody versus anti-VEGF monoclonal antibody.     · Palliative chemotherapy cycle#1 FOLFIRI was started on 10/13/2020.  No bolus 5-FU given considering previous poor tolerance.    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  Microsatellite stable, mutation burden 5/Mb.   · Discussed with the patient 10/27/2020, because of the [place] mutation, she is not a candidate for anti-EGFR monoclonal antibody such as Erbitux or vectibix.  She could be a candidate for anti-VEGF monoclonal antibody, however because of active wound, she is not a candidate right now at this moment.  · Patient seen in the ED for acute right neck pain on 11/16/2020.  CT angiogram as well as CT of the cervical spine performed with no acute findings but notably one of her pulmonary nodules, left upper lobe, somewhat decreased in size.  Patient given prednisone pack.  Further details below.  · Patient due today for cycle #4 FOLFIRI.  Plan repeat PET scan after cycle 6.  · Last received cycle #5 chemotherapy without irinotecan on 12/8/2020.   · Patient here 12/29/2020 for evaluation and consideration of cycle 6, but she continues to complain of swelling.  She does have swelling typically after treatment as well.  We will hold treatment for 1 week and reevaluate next week.  Still planning on PET scan following cycle 6.   · Cycle #6 chemotherapy will be resumed on 1/5/2021.  Started back on original irinotecan.  · PET scan examination obtained on 1/12/2021 after cycle #6 chemotherapy reported improved pulmonary nodule hypermetabolic activity.  Confirmed these are metastatic lesions.  · Patient is evaluated 1/19/2020, will continue cycle #7 FOLFIRI  chemotherapy.  However Avastin will be on hold see next section.   · Patient was reevaluated on 2/2/2021, will continue chemotherapy cycle #8 FOLFIRI.  Avastin / biosimilar will be added.    · She talked to [facility] specialist in mid February 2021, and had recommended palliative chemotherapy without surgical intervention of metastatic lung lesions.   · 2/16/2021cycle #9 FOLFIRI plus bevacizumab.  Tolerated last cycle well with only some mild increase in liquid stools.  This was easily controlled with antidiarrheals.  · On 3/2/2021, patient will proceed with cycle #10 FOLFIRI plus bevacizumab.  After 12 cycles, plan to start maintenance treatment.  · 3/16/2021 cycle 11.  Plus bevacizumab.  · 3/30/2021 cycle 12 FOLFIRI plus bevacizumab.  Having issues with worsening nausea despite premeds with dexamethasone, Aloxi, Emend, and Zofran at home.  Patient is requesting a dose of Phenergan, which is helped her in the past.  We will give this to her with treatment today.  · PET scan examination on 4/6/2021 reported no evidence of hypermetabolic metastatic lesion.  · Discussed with the patient on 4/13/2021, we reviewed the PET scan results.  We recommended to switch to maintenance chemotherapy without irinotecan.  We will continue 5-FU and Avastin every 2 weeks.  We discussed possibility of switching to oral Xeloda treatment.  Discussed with the patient for side effects more so with hand-foot syndrome.  Patient is agreeable.   · Xeloda 2000 mg twice daily 7 days on, 7 days off along with Avastin initiated 4/27/2021.  · After only 5 doses of Xeloda significant hand-foot syndrome, Xeloda held.  Dose adjustment versus transition to 5-FU will be further discussed with Dr. Nguyen  · Patient returns 5/11/2021 for reevaluation.  She did take her first dose of Xeloda 1500 mg this morning, but is actually requesting to be transition back to infusional 5-FU, as she felt that she tolerated infusional 5-FU  without any problem.  The patient will receive 5-FU leucovorin and Avastin every 2 weeks.  Xeloda will be discontinued.  · 5/25/2021 continued cycle #4 maintenance 5-FU, leucovorin, Avastin tolerating this much better so far, we will continue this.  · On 6/8/2021, will proceed ahead with cycle #5 maintenance chemotherapy with infusional 5-FU, leucovorin and Avastin.  · 6/22/2021 continues on maintenance chemotherapy with infusional 5-FU, leucovorin, Avastin tolerating well.  · 7/6/2021, proceed with maintenance chemotherapy cycle #7.  I discussed with the patient, recommended to have 12 cycles of maintenance chemotherapy, then obtain PET scan for reevaluation.  We could discuss further treatment plan at that time.    2 .  Pulmonary nodule, hypermetabolic on PET scan.   · Her original PET scan examination from 8/8/2019 reported a small 8 mm right upper apex pulmonary nodule but hypermetabolic SUV 5.1.  That was too small to be biopsied, however there was always a possibility of metastatic disease considering that high activity despite a such a small nodule.  · Her chest CT scan examination from 3/13/2020 reported this shrank to 6 mm.    · PET scan examination on 5/21/2020 reported no metabolic activity at this residual nodule.  This needs to be monitored.    · PET scan examination 9/18/2020 reported couple of hypermetabolic pulmonary nodules, besides a few extra small pulmonary nodules.  Those are highly suspicious for metastatic disease.    · PET scan examination obtained on 1/12/2021 after cycle #6 chemotherapy reported improved a pulmonary nodule hypermetabolic activity.  Confirmed these are metastatic lesions.  · 1/19/2021, patient reports she will see thoracic surgeon tomorrow at the [facility] where she seeks second opinion evaluation, and to see whether she would be a candidate for resection of pulmonary nodules.  I discussed with the patient, she had at least 2 hypermetabolic lesions although they  responded to chemotherapy, I do not think she would be a candidate for surgery.   · Thoracic surgeon from [facility] recommended metastectomy and to discontinue chemotherapy afterwards.   · Patient had a third opinion evaluation on 2/11/2021 by [place]medicine [place] [facility] [place] who recommended palliative chemotherapy with no surgical intervention for metastatic pulmonary lesions.  Patient was agreeable with this strategy.      3.   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  Low-dose Lovenox discontinued and initiated Xarelto 20 mg daily.  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  · Patient held Xarelto for 2 days prior to MediPort removal from the left chest wall and placement of new port in the right chest wall on 12/18/2020.  She resumed Xarelto that evening.  · Progressive swelling in the neck, face, upper extremities prompting hospitalization with CT scan showing thrombosis in the right subclavian vein and SVC.  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  Although she had been off of Xarelto for a few days, clearly Xarelto was not prohibiting progression of her thrombosis after she resumed treatment and there was some evidence to suggest thrombosis had been present at least in the innominate vein on prior scan in September but now appears chronic.  Current acute thrombosis involving SVC and right  subclavian.  · Patient was admitted and placed on heparin drip  · Patient was evaluated by vascular surgery and intervention was not recommended for thrombolysis/thrombectomy.  · On 12/22/2020, the patient developed worsening shortness of breath, increasing upper extremity edema consistent with worsening SVC syndrome.  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  · Symptoms improved and patient was discharged 12/24/2020 on Lovenox 100 mg every 12 hours.    · Returns 12/29/2020 for evaluation continuing Lovenox, overall tolerating it well.  No bleeding issues.  · Improved edema 1/5/2021.  Will continue Lovenox weight-based every 12 hours.  · On 1/19/2021 and 2/2/2021, patient reports improved facial swelling.  She however does have being bruise on her abdomen wall where she does Lovenox injection.  However she does not have a spontaneous epistaxis, gum bleeding or other bleeding.   · On 2/2/2021, we discussed that side effects of Avastin/biosimilar related to thrombosis.  Since this patient already has been on Lovenox, I think the benefits from treatment for her cancer will outweigh that risk of thrombosis, will proceed ahead with Avastin.  I cautioned patient to watch out for signs of worsening thrombosis patient voiced understanding.   · On 3/16/2021, no evidence of worsening DVT, continue Lovenox.  · 5/11/2021 Lovenox dosing will be adjusted due to patient's weight loss.  We discussed she needs 1 mg/kg.  Her syringes are 100 mg syringes she will do 0.9 mL subcu every 12 hours.  · On 6/8/2021 and 7/6/2021, patient continues to have easy bruising at the site of Lovenox injection, otherwise no spontaneous bleeding.      4.  This patient also has strong family history for malignancy the patient had appointment with genetic counseling on September 3, 2019.  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    5.  Mild anemia, improved since her surgery.    · She also has a history of iron deficiency.  Iron studies reveal a saturation of just 10%.  She was started on oral iron but was unable to tolerate due to GI side effects.   · Status post Injectafer 2/5/2020 and 2/12/2020   · Improved hemoglobin 13.5 on 1/5/2021.  · Hemoglobin 14.7 on 2/16/2021.    · Hemoglobin 16.2 on 3/2/2021.    · 3/16/2020 when hemoglobin now up to 16.2, hematocrit 47.6.  Patient admits that she has started smoking again, and I have encouraged her to quit.  She denies any snoring or sleep apnea diagnosis.  Patient is not taking oral iron.  We will closely monitor.  · 3/30/2020 hemoglobin 15.7, HCT 47.5.  Patient states that she has cut back significantly on her smoking, although not quit yet.  I have encouraged her to continue working on this.  We will continue to closely monitor.  · Hemoglobin 14.6 on 6/8/2021 at the meantime he has worsening macrocytosis .6.    · Laboratory studies 6/22/2021 reported excellent folate more than 20 ng/mL, however low normal B12 level 357,000.  · On 7/6/2021, normal hemoglobin 15.8, .9 and HCT 47.2%.    6.  Peripheral neuropathy secondary to chemotherapy.   · This is mild involving bilateral hands and feet as reported on 9/16/2020.   · Will avoid chemotherapy with oxaliplatin.  · Stable mild neuropathy as of 11/10/2020  · Patient reports worsening peripheral neuropathy and cycle #5 chemotherapy on 12/8/2020 with and without irinotecan.   · On 1/5/2021, patient reports improvement of peripheral neuropathy, will add irinotecan back to chemotherapy.  Continue to monitor and adjust medication.  · On 3/2/2021, patient reports no worsening of peripheral neuropathy after restarting irinotecan.   · As of 7/6/2021 her neuropathy is stable.    7.  History of hand-foot syndrome grade 3.    · It become so significant after cycle #7 FOLFOX treatment.  Discussed with patient on 5/13/2020, will discontinue  the bolus 5-FU dose, and also decrease 50% of the infusion dose through the pump.   · We also use Medrol Dosepak for possible recurrent symptomology.  She responded this well.   · Her hand-foot syndrome improved.  · Under current treatment with FOLFIRI, patient not receiving 5-FU bolus.  No recurrent issues.   · Patient will be switched to maintenance chemotherapy using Xeloda in late April 2021.  · Following 5 doses of Xeloda, significant hand-foot syndrome with pain in the feet, significant erythema.  Xeloda held.  Will be further discussed with Dr. Nguyen to determine if the patient will resume 5-FU versus a dose reduction to Xeloda.  · Aggressive emollient moisturizer use twice daily for the past week and patient reports improvement in the dryness and erythema.  Xeloda will now be discontinued and patient will switch back to 5-FU.  · As of 5/25/2021 dryness and erythema/hyperpigmentation continues to improve.  Xeloda has been discontinued.  · 6/8/2021, patient reports much improved hand-foot syndrome, with remnant pigmentation on palms.  · On 7/6/2021, patient reports and had a some flareup of hand-foot syndrome, however improved after aggressive moisturizing cream and the urea cream.  Overall much tolerated infusional 5-FU compared to Xeloda.      8.  Hyperlacrimation and mild scleral irritation related to 5-FU.  She has been taking steroid eyedrops.  This has improved her hyperlacrimation.  · Not currently an issue.  Continue to monitor with 5-FU.      9.  Abnormal liver function panel.  · Improved liver function panel 9/18/2020.  This is probably related to her recent infection.  · She has normalized AST, alk phosphatase, and a much improved only marginally elevated ALT 35 on 10/13/2020.    · Normalized liver function panel on 10/27/2020.    · Normal liver panel on 11/10/2020.    · Normal liver function panel on 12/5/2021.    · Slightly worsening liver function with AST 33 and ALT 56 on 1/19/2021.     · Improved AST 21 and ALT 39 on 2/2/2021.    · ALT 59 and AST 29 on 3/2/2021.    · ALT 56, AST 24, alk phosphatase 121 and total bilirubin 0.3 on 4/13/2021.    · 6/8/2021, only marginally elevated ALT 48 otherwise normal liver function panel.    · On 7/6/2021, ALT 42, normal AST ALT and total bilirubin.  Continue to monitor.      10.  Intermittent leukocytopenia secondary to chemotherapy.   · WBC currently normal at 5220 and the neutrophil 3520 on 1/5/2021.  Continue to monitor.    · On 2/2/2021, WBC 4110 including ANC 2540.  Continue to monitor for now.  Consider G-CSF if neutropenia becomes an ongoing issue.   · 2/16/2021 WBC 4.6, ANC 2.79.    · 3/16/2021 WBC 3.93, ANC 2.26, continue to monitor.  · On 4/13/2021 WBC 4250 including ANC 2640.  · 5/25/2021 WBC 4.21 ANC 2.35.    · 6/22/2021 WBC stable at 6.07, ANC 4.33.  · 7/6/2021 WBC 5120 and ANC 3450.  Continue to monitor.    11.  Depression.  Patient seen by JULIET Davenport on 11/9/2020.  She was started on mirtazapine.  Lexapro was discontinued.  This has definitely improved her mood with the patient feeling overall much better.  She is sleeping better.  Appetite is improved with her actually eating more than she wants to.    · Condition is stable.  She plans to continue follow-up with supportive oncology.    12.  Intermittent upper abdominal discomfort.  This improves with eating.  After further discussion with the patient she also notes 3 nights of increased reflux when laying down.  We discussed her symptoms could be related to increase stomach acid or potential ulcer.  She is currently taking Pepcid 20 mg daily.  I instructed her to add Prilosec 20 mg daily.   · On 3/16/2021 patient reports some worsening reflux symptoms last week.  She has increased Pepcid to 20 mg twice daily, which did resolve her symptoms.  We discussed she could add Prilosec 20 mg daily as well.   · No complaint today.  Continue to monitor.    13.  Proteinuria: 3/30/2020: Patient  is 2+ protein in her urine.  We will continue with Avastin and closely monitor.  No need for 24-hour urine at this time.  · Persistent 2+ proteinuria on 4/13/2021.  continue to monitor.  · 5/25/2021 protein urine only 1+.    · 6/22/2021 persistent 1+ proteinuria.  Continue to monitor.  · 7/6/2021, trace protein.  Continue Avastin treatment and monitoring.    14.  Tachycardia:   · Patient was seen by Dr. Bustos cardiologist on 4/6/2021.  We will continue to follow-up in 3 months.    15.  Candidiasis:  · Present in the skin fold of the abdomen.  I prescribed nystatin cream.  This has resolved.    PLAN:  1. Proceed with cycle #7 maintenance chemotherapy with infusional 5-FU, leucovorin, Avastin today.  2. Continue Lovenox 1 mg/kg twice daily.  3. Continue antiemetics if needed at home for nausea.  4. Continue aggressive emollient moisturizers to hands and feet.  5. Try oral vitamin B12 supplement at 1000 mcg daily.  Use OTC product.  6. Return every 2 weeks for follow-up visit with for the next 2 cycles of maintenance chemotherapy 5-FU, leucovorin, Avastin.    7. We will bring patient back in to see me in 6 weeks, on cycle #10.    8. I discussed with the patient today for long-term treatment plan, and I recommended to obtain PET scan examination of the second B12 for reassessment.  We could consider give her a short chemotherapy holiday, or change maintenance chemotherapy to every 3 weeks or even every 4 weeks.  We discussed this after PET scan.      Patient on high risk medication requiring close monitoring for toxicity    Dong Nguyen MD PhD  07/06/21      CC:  WAYNE Worley MD Carrie B. Scharf, MD

## 2021-07-08 ENCOUNTER — INFUSION (OUTPATIENT)
Dept: ONCOLOGY | Facility: HOSPITAL | Age: 41
End: 2021-07-08

## 2021-07-08 DIAGNOSIS — Z45.2 ENCOUNTER FOR FITTING AND ADJUSTMENT OF VASCULAR CATHETER: Primary | ICD-10-CM

## 2021-07-08 PROCEDURE — 25010000002 HEPARIN LOCK FLUSH PER 10 UNITS: Performed by: INTERNAL MEDICINE

## 2021-07-08 RX ORDER — SODIUM CHLORIDE 0.9 % (FLUSH) 0.9 %
10 SYRINGE (ML) INJECTION AS NEEDED
Status: DISCONTINUED | OUTPATIENT
Start: 2021-07-08 | End: 2021-07-08 | Stop reason: HOSPADM

## 2021-07-08 RX ORDER — SODIUM CHLORIDE 0.9 % (FLUSH) 0.9 %
10 SYRINGE (ML) INJECTION AS NEEDED
Status: CANCELLED | OUTPATIENT
Start: 2021-07-08

## 2021-07-08 RX ORDER — HEPARIN SODIUM (PORCINE) LOCK FLUSH IV SOLN 100 UNIT/ML 100 UNIT/ML
500 SOLUTION INTRAVENOUS AS NEEDED
Status: CANCELLED | OUTPATIENT
Start: 2021-07-08

## 2021-07-08 RX ORDER — HEPARIN SODIUM (PORCINE) LOCK FLUSH IV SOLN 100 UNIT/ML 100 UNIT/ML
500 SOLUTION INTRAVENOUS AS NEEDED
Status: DISCONTINUED | OUTPATIENT
Start: 2021-07-08 | End: 2021-07-08 | Stop reason: HOSPADM

## 2021-07-08 RX ADMIN — Medication 500 UNITS: at 10:38

## 2021-07-08 RX ADMIN — SODIUM CHLORIDE, PRESERVATIVE FREE 10 ML: 5 INJECTION INTRAVENOUS at 10:38

## 2021-07-16 ENCOUNTER — OFFICE VISIT (OUTPATIENT)
Dept: INTERNAL MEDICINE | Facility: CLINIC | Age: 41
End: 2021-07-16

## 2021-07-16 VITALS
SYSTOLIC BLOOD PRESSURE: 130 MMHG | TEMPERATURE: 98.4 F | HEART RATE: 76 BPM | DIASTOLIC BLOOD PRESSURE: 82 MMHG | BODY MASS INDEX: 32.15 KG/M2 | OXYGEN SATURATION: 98 % | WEIGHT: 193 LBS | HEIGHT: 65 IN

## 2021-07-16 DIAGNOSIS — Z79.01 CHRONIC ANTICOAGULATION: Chronic | ICD-10-CM

## 2021-07-16 DIAGNOSIS — L27.1 HAND FOOT SYNDROME: Chronic | ICD-10-CM

## 2021-07-16 DIAGNOSIS — D68.51 HETEROZYGOUS FACTOR V LEIDEN MUTATION (HCC): Primary | Chronic | ICD-10-CM

## 2021-07-16 DIAGNOSIS — F33.9 MONOPOLAR DEPRESSION (HCC): Chronic | ICD-10-CM

## 2021-07-16 DIAGNOSIS — C20 ADENOCARCINOMA OF RECTUM (HCC): Chronic | ICD-10-CM

## 2021-07-16 PROCEDURE — 99212 OFFICE O/P EST SF 10 MIN: CPT | Performed by: NURSE PRACTITIONER

## 2021-07-16 NOTE — PROGRESS NOTES
Chief Complaint  Cancer (rectal ) and Depression     Subjective:      History of Present Illness {CC  Problem List  Visit  Diagnosis   Encounters  Notes  Medications  Labs  Result Review Imaging  Media :23}     Carole Weaver presents to [facility] for follow up of multiple conditions including: Rectal cancer, peripheral neuropathy, chronic anticoagulation (hetero Factor V Leiden), hypertension, anxiety, depression    Progress Notes by Deepika Vela III, NP-C (04/12/2021 3:45 PM) - note reviewed.     Now on maintenance 5-FUtx , leucovorin every 2 weeks. Xeloda had considerable side effects and was stopped.  She has been having hand-foot syndrome. She states it has improved with using emollients and urea cream.     She states last CT was stable so her mood has improved today.     She is also now taking B-12 supplement.     She has had both COVID vaccines - I discussed that particularly given her immune system, she may not have had great benefit from vaccines and needs to still be vigilant with masking/ hygiene and distancing.     She continues anticoagulant for Factor V.  She would like her son checked at his next visit.  He has not had COVID vaccine.     She does continue to smoke.           Objective:      Physical Exam  Vitals reviewed.   Constitutional:       Appearance: Normal appearance. She is well-developed.   HENT:      Head:      Comments: Wearing mask due to COVID   Neck:      Thyroid: No thyromegaly.   Cardiovascular:      Rate and Rhythm: Normal rate and regular rhythm.      Pulses: Normal pulses.      Heart sounds: Normal heart sounds.   Pulmonary:      Effort: Pulmonary effort is normal.      Breath sounds: Normal breath sounds.      Comments: E/U   Skin:     General: Skin is warm and dry.      Capillary Refill: Capillary refill takes 2 to 3 seconds.   Neurological:      Mental Status: She is alert and oriented to person, place, and time.  "  Psychiatric:         Mood and Affect: Mood normal.         Behavior: Behavior normal. Behavior is cooperative.         Thought Content: Thought content normal.         Judgment: Judgment normal.        Result Review  Data Reviewed:{ Labs  Result Review  Imaging  Med Tab  Media :23}            Vital Signs:   /82 (BP Location: Left arm, Patient Position: Sitting, Cuff Size: Adult)   Pulse 76   Temp 98.4 °F (36.9 °C) (Temporal)   Ht 166 cm (65.35\")   Wt 87.5 kg (193 lb)   SpO2 98%   BMI 31.77 kg/m²         Requested Prescriptions      No prescriptions requested or ordered in this encounter     Routine medications provided by this office will also be refilled via pharmacy request.       Current Outpatient Medications:   •  clonazePAM (KlonoPIN) 1 MG tablet, TAKE 1 TABLET BY MOUTH THREE TIMES A DAY AS NEEDED FOR ANXIETY OR SEIZURES, Disp: 90 tablet, Rfl: 0  •  dicyclomine (BENTYL) 20 MG tablet, TAKE 1 TABLET BY MOUTH EVERY 6 HOURS AS NEEDED, Disp: 360 tablet, Rfl: 0  •  diphenoxylate-atropine (LOMOTIL) 2.5-0.025 MG per tablet, TAKE 1 TABLET BY MOUTH 4 (FOUR) TIMES A DAY AS NEEDED FOR DIARRHEA., Disp: 120 tablet, Rfl: 1  •  enoxaparin (Lovenox) 100 MG/ML solution syringe, Inject 1 mL under the skin into the appropriate area as directed Every 12 (Twelve) Hours., Disp: 60 mL, Rfl: 2  •  escitalopram (LEXAPRO) 10 MG tablet, TAKE 1 TABLET BY MOUTH EVERY DAY, Disp: 30 tablet, Rfl: 5  •  famotidine (PEPCID) 20 MG tablet, TAKE 1 TABLET BY MOUTH TWICE A DAY, Disp: 180 tablet, Rfl: 1  •  Loratadine (CLARITIN) 10 MG capsule, Take 10 mg by mouth Every Evening., Disp: , Rfl:   •  mineral oil-hydrophilic petrolatum (AQUAPHOR) ointment, Apply 1 application topically to the appropriate area as directed As Needed for Dry Skin., Disp: , Rfl:   •  nystatin (MYCOSTATIN) 210594 UNIT/GM cream, Apply  topically to the appropriate area as directed 2 (Two) Times a Day As Needed (rash)., Disp: 30 g, Rfl: 2  •  ondansetron " (ZOFRAN) 8 MG tablet, Take 1 tablet by mouth 3 (Three) Times a Day As Needed for Nausea or Vomiting., Disp: 60 tablet, Rfl: 5  •  prochlorperazine (COMPAZINE) 10 MG tablet, Take 1 tablet by mouth Every 6 (Six) Hours As Needed for Nausea or Vomiting., Disp: 30 tablet, Rfl: 2  •  promethazine (PHENERGAN) 25 MG tablet, Take 1 tablet by mouth Every 6 (Six) Hours As Needed for Nausea or Vomiting., Disp: 60 tablet, Rfl: 2     Assessment and Plan:      Assessment and Plan {CC Problem List  Visit Diagnosis  ROS  Review (Popup)  Health Maintenance  Quality  BestPractice  Medications  SmartSets  SnapShot Encounters  Media :23}     Problem List Items Addressed This Visit        Coag and Thromboembolic    Chronic anticoagulation (Chronic)    Heterozygous factor V Leiden mutation (CMS/HCC) - Primary (Chronic)       Hematology and Neoplasia    Adenocarcinoma of rectum (CMS/HCC) (Chronic)       Mental Health    Monopolar depression (CMS/HCC) (Chronic)       Skin    Hand foot syndrome (Chronic)          She states she is tolerating chemo.  Hand/foot is bothersome but not as bad as it was. Mood is much better.   Again - emphasized caution at work and when going to public places indoors.   No change in medication.     Follow Up {Instructions Charge Capture  Follow-up Communications :23}     Return in about 3 months (around 10/16/2021).    Patient was given instructions and counseling regarding her condition or for health maintenance advice. Please see specific information pulled into the AVS if appropriate.    Dragon disclaimer:   Much of this encounter note is an electronic transcription/translation of spoken language to printed text. The electronic translation of spoken language may permit erroneous, or at times, nonsensical words or phrases to be inadvertently transcribed; Although I have reviewed the note for such errors, some may still exist.     Additional Patient Counseling:       There are no Patient  Instructions on file for this visit.

## 2021-07-18 PROBLEM — L27.1 HAND FOOT SYNDROME: Chronic | Status: ACTIVE | Noted: 2020-05-13

## 2021-07-20 ENCOUNTER — INFUSION (OUTPATIENT)
Dept: ONCOLOGY | Facility: HOSPITAL | Age: 41
End: 2021-07-20

## 2021-07-20 ENCOUNTER — OFFICE VISIT (OUTPATIENT)
Dept: ONCOLOGY | Facility: CLINIC | Age: 41
End: 2021-07-20

## 2021-07-20 VITALS
BODY MASS INDEX: 32.04 KG/M2 | HEIGHT: 65 IN | TEMPERATURE: 97.5 F | SYSTOLIC BLOOD PRESSURE: 135 MMHG | HEART RATE: 109 BPM | RESPIRATION RATE: 16 BRPM | DIASTOLIC BLOOD PRESSURE: 90 MMHG | WEIGHT: 192.3 LBS | OXYGEN SATURATION: 97 %

## 2021-07-20 DIAGNOSIS — C20 ADENOCARCINOMA OF RECTUM (HCC): Primary | ICD-10-CM

## 2021-07-20 DIAGNOSIS — L27.1 HAND FOOT SYNDROME: Chronic | ICD-10-CM

## 2021-07-20 DIAGNOSIS — C78.00 MALIGNANT NEOPLASM METASTATIC TO LUNG, UNSPECIFIED LATERALITY (HCC): ICD-10-CM

## 2021-07-20 DIAGNOSIS — Z79.01 CHRONIC ANTICOAGULATION: Chronic | ICD-10-CM

## 2021-07-20 DIAGNOSIS — C20 ADENOCARCINOMA OF RECTUM (HCC): Primary | Chronic | ICD-10-CM

## 2021-07-20 DIAGNOSIS — C20 ADENOCARCINOMA OF RECTUM (HCC): ICD-10-CM

## 2021-07-20 DIAGNOSIS — Z83.2 FAMILY HISTORY OF FACTOR V LEIDEN MUTATION: ICD-10-CM

## 2021-07-20 LAB
ALBUMIN SERPL-MCNC: 3.6 G/DL (ref 3.5–5.2)
ALBUMIN/GLOB SERPL: 1.1 G/DL (ref 1.1–2.4)
ALP SERPL-CCNC: 91 U/L (ref 38–116)
ALT SERPL W P-5'-P-CCNC: 44 U/L (ref 0–33)
ANION GAP SERPL CALCULATED.3IONS-SCNC: 12.3 MMOL/L (ref 5–15)
AST SERPL-CCNC: 30 U/L (ref 0–32)
BASOPHILS # BLD AUTO: 0.03 10*3/MM3 (ref 0–0.2)
BASOPHILS NFR BLD AUTO: 0.6 % (ref 0–1.5)
BILIRUB SERPL-MCNC: 0.3 MG/DL (ref 0.2–1.2)
BILIRUB UR QL STRIP: ABNORMAL
BUN SERPL-MCNC: 8 MG/DL (ref 6–20)
BUN/CREAT SERPL: 9.9 (ref 7.3–30)
CALCIUM SPEC-SCNC: 9.3 MG/DL (ref 8.5–10.2)
CHLORIDE SERPL-SCNC: 100 MMOL/L (ref 98–107)
CLARITY UR: ABNORMAL
CO2 SERPL-SCNC: 23.7 MMOL/L (ref 22–29)
COLOR UR: ABNORMAL
CREAT SERPL-MCNC: 0.81 MG/DL (ref 0.6–1.1)
DEPRECATED RDW RBC AUTO: 56 FL (ref 37–54)
EOSINOPHIL # BLD AUTO: 0.14 10*3/MM3 (ref 0–0.4)
EOSINOPHIL NFR BLD AUTO: 2.6 % (ref 0.3–6.2)
ERYTHROCYTE [DISTWIDTH] IN BLOOD BY AUTOMATED COUNT: 14.2 % (ref 12.3–15.4)
GFR SERPL CREATININE-BSD FRML MDRD: 78 ML/MIN/1.73
GLOBULIN UR ELPH-MCNC: 3.2 GM/DL (ref 1.8–3.5)
GLUCOSE SERPL-MCNC: 157 MG/DL (ref 74–124)
GLUCOSE UR STRIP-MCNC: NEGATIVE MG/DL
HCT VFR BLD AUTO: 46.2 % (ref 34–46.6)
HGB BLD-MCNC: 15.5 G/DL (ref 12–15.9)
HGB UR QL STRIP.AUTO: NEGATIVE
IMM GRANULOCYTES # BLD AUTO: 0.01 10*3/MM3 (ref 0–0.05)
IMM GRANULOCYTES NFR BLD AUTO: 0.2 % (ref 0–0.5)
KETONES UR QL STRIP: NEGATIVE
LEUKOCYTE ESTERASE UR QL STRIP.AUTO: NEGATIVE
LYMPHOCYTES # BLD AUTO: 1.08 10*3/MM3 (ref 0.7–3.1)
LYMPHOCYTES NFR BLD AUTO: 20.1 % (ref 19.6–45.3)
MCH RBC QN AUTO: 35.6 PG (ref 26.6–33)
MCHC RBC AUTO-ENTMCNC: 33.5 G/DL (ref 31.5–35.7)
MCV RBC AUTO: 106.2 FL (ref 79–97)
MONOCYTES # BLD AUTO: 0.4 10*3/MM3 (ref 0.1–0.9)
MONOCYTES NFR BLD AUTO: 7.4 % (ref 5–12)
NEUTROPHILS NFR BLD AUTO: 3.72 10*3/MM3 (ref 1.7–7)
NEUTROPHILS NFR BLD AUTO: 69.1 % (ref 42.7–76)
NITRITE UR QL STRIP: NEGATIVE
NRBC BLD AUTO-RTO: 0 /100 WBC (ref 0–0.2)
PH UR STRIP.AUTO: 6 [PH] (ref 4.5–8)
PLATELET # BLD AUTO: 178 10*3/MM3 (ref 140–450)
PMV BLD AUTO: 9.6 FL (ref 6–12)
POTASSIUM SERPL-SCNC: 3.7 MMOL/L (ref 3.5–4.7)
PROT SERPL-MCNC: 6.8 G/DL (ref 6.3–8)
PROT UR QL STRIP: ABNORMAL
RBC # BLD AUTO: 4.35 10*6/MM3 (ref 3.77–5.28)
SODIUM SERPL-SCNC: 136 MMOL/L (ref 134–145)
SP GR UR STRIP: >=1.03 (ref 1–1.03)
UROBILINOGEN UR QL STRIP: ABNORMAL
WBC # BLD AUTO: 5.38 10*3/MM3 (ref 3.4–10.8)

## 2021-07-20 PROCEDURE — 81003 URINALYSIS AUTO W/O SCOPE: CPT

## 2021-07-20 PROCEDURE — 25010000002 FOSAPREPITANT PER 1 MG: Performed by: NURSE PRACTITIONER

## 2021-07-20 PROCEDURE — 25010000002 BEVACIZUMAB PER 10 MG: Performed by: NURSE PRACTITIONER

## 2021-07-20 PROCEDURE — 25010000002 BEVACIZUMAB 100 MG/4ML SOLUTION 4 ML VIAL: Performed by: NURSE PRACTITIONER

## 2021-07-20 PROCEDURE — 25010000002 FLUOROURACIL PER 500 MG: Performed by: NURSE PRACTITIONER

## 2021-07-20 PROCEDURE — 99214 OFFICE O/P EST MOD 30 MIN: CPT | Performed by: NURSE PRACTITIONER

## 2021-07-20 PROCEDURE — 96367 TX/PROPH/DG ADDL SEQ IV INF: CPT

## 2021-07-20 PROCEDURE — 80053 COMPREHEN METABOLIC PANEL: CPT

## 2021-07-20 PROCEDURE — 85025 COMPLETE CBC W/AUTO DIFF WBC: CPT

## 2021-07-20 PROCEDURE — 25010000002 PALONOSETRON PER 25 MCG: Performed by: NURSE PRACTITIONER

## 2021-07-20 PROCEDURE — 96413 CHEMO IV INFUSION 1 HR: CPT

## 2021-07-20 PROCEDURE — 96375 TX/PRO/DX INJ NEW DRUG ADDON: CPT

## 2021-07-20 PROCEDURE — 25010000002 LEUCOVORIN CALCIUM PER 50 MG: Performed by: NURSE PRACTITIONER

## 2021-07-20 PROCEDURE — 25010000002 DEXAMETHASONE SODIUM PHOSPHATE 100 MG/10ML SOLUTION: Performed by: NURSE PRACTITIONER

## 2021-07-20 PROCEDURE — 96416 CHEMO PROLONG INFUSE W/PUMP: CPT

## 2021-07-20 RX ORDER — SODIUM CHLORIDE 9 MG/ML
250 INJECTION, SOLUTION INTRAVENOUS ONCE
Status: COMPLETED | OUTPATIENT
Start: 2021-07-20 | End: 2021-07-20

## 2021-07-20 RX ORDER — PALONOSETRON 0.05 MG/ML
0.25 INJECTION, SOLUTION INTRAVENOUS ONCE
Status: CANCELLED | OUTPATIENT
Start: 2021-07-20

## 2021-07-20 RX ORDER — PALONOSETRON 0.05 MG/ML
0.25 INJECTION, SOLUTION INTRAVENOUS ONCE
Status: COMPLETED | OUTPATIENT
Start: 2021-07-20 | End: 2021-07-20

## 2021-07-20 RX ORDER — SODIUM CHLORIDE 9 MG/ML
250 INJECTION, SOLUTION INTRAVENOUS ONCE
Status: CANCELLED | OUTPATIENT
Start: 2021-07-20

## 2021-07-20 RX ADMIN — SODIUM CHLORIDE 250 ML: 9 INJECTION, SOLUTION INTRAVENOUS at 12:12

## 2021-07-20 RX ADMIN — FLUOROURACIL 4850 MG: 50 INJECTION, SOLUTION INTRAVENOUS at 14:06

## 2021-07-20 RX ADMIN — PALONOSETRON HYDROCHLORIDE 0.25 MG: 0.25 INJECTION INTRAVENOUS at 12:12

## 2021-07-20 RX ADMIN — SODIUM CHLORIDE 100 ML: 9 INJECTION, SOLUTION INTRAVENOUS at 12:30

## 2021-07-20 RX ADMIN — DEXAMETHASONE SODIUM PHOSPHATE 12 MG: 10 INJECTION, SOLUTION INTRAMUSCULAR; INTRAVENOUS at 12:14

## 2021-07-20 RX ADMIN — SODIUM CHLORIDE 810 MG: 900 INJECTION, SOLUTION INTRAVENOUS at 13:32

## 2021-07-20 RX ADMIN — BEVACIZUMAB 900 MG: 400 INJECTION, SOLUTION INTRAVENOUS at 13:01

## 2021-07-20 NOTE — PROGRESS NOTES
REASON FOR FOLLOW UP:     1. Rectal cancer, rectal ultrasound examination in July 2019 reported T3N0 disease without lymphadenopathy. She does have small pulmonary nodule 6-7 mm and 2 mm with indeterminate feature. There is no solid evidence of metastatic disease otherwise. Patient has stage IIA (T3N0M0) disease.  2. The patient is heterozygous factor V Leiden, was on prophylactic anticoagulation with Lovenox 40 mg daily given her increased risk of thrombosis with MediPort and GI malignancy.   3. PET scan on 8/8/2019 reported an 8 mm hypermetabolic right upper lobe pulmonary nodule, which is suspicious for metastatic as well.    4. Patient had a PowerPort placement on the left upper chest by Dr. Joseph on 8/9/2019.  5. Patient was started on concurrent infusional 5-FU chemoradiation therapy on 8/12/2019, with planned complete surgical resection and further adjuvant chemotherapy with intention to cure the disease.   6. Patient underwent surgical resection of the primary rectal cancer by Dr. Ye on 12/2/2019.  Pathology evaluation reported residual T3N1 disease stage IIIb.  7. Diarrhea related to therapy and radiation.   8. Patient was started cycle 1 day 1 of adjuvant FOLFOX 6 on 1/23/2020.  9. On 2/5/2020 FOLFOX 6 cycle 2 delayed secondary to neutropenia.  Patient was given 3 days of Granix injection.  After cycle #2 we planned 3 days of Granix with each cycle.   10. Patient also intolerant of oral iron.  Patient received 2 doses of IV injectafer on 02/05/2020 and 02/12/2020.   11. 02/12/2020 Proceed with cycle #2 of FOLFOX 6 with 3 days of Granix.  12. On 3/11/2020 cycle 4 postponed secondary to abdominal pain and occasional low-grade fevers.  CT scans ordered.  13. Cycle #4 resumed after CT scan revealed no evidence of disease.  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 Ye. patient has no fever.  Continue to observe.   14. Grade 3  hand-foot syndrome secondary to 5-FU after cycle #7 FOLFOX 6 chemotherapy, prompting ER visit.  Also has worsening peripheral neuropathy.   15. Cycle #8 FOLFOX 6 was given on 5/13/2020, with 50% dose reduction for both 5-FU and oxaliplatin, and also examination of bolus 5-FU.   16. PET scan examination on 5/21/2020 reported no evidence of metastatic disease in the chest abdomen pelvis.  Stable 6 mm RUL pulmonary nodule.  17. On 5/27/2020, I discussed with the patient that we can discontinue chemotherapy at this time.   18. Patient had a surgical procedure for low anterior colon resection, coloanal anastomosis on 8/3/2020.  19. CT scan for chest abdomen pelvis on 9/9/2020 reported a new 8 mm noncalcified pulmonary nodule in the left lower lobe of the lung.  Otherwise stable right upper lobe 6 mm pulmonary nodule, and no evidence of disease recurrence in the abdomen or pelvis.  20. PET scan examination on 9/18/2020 reported multiple hypermetabolic small pulmonary nodules/ new pulmonary nodules.   21. Patient was started on pelvic chemotherapy with FOLFIRI regimen on 10/13/2020.   22. 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 It reported tumor mutation burden 5 Muts/Mb, microsatellite stable, TP53 mutation R282W, and others.   23. Cycle #5 was without irinotecan, due to peripheral neuropathy.  24. 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  Patient was switched to weight-based Lovenox injection.  25. Cycle #6 5-FU and irinotecan was delayed by 2 weeks because of the above incident.  26. Patient had a telemedicine evaluation at that the [facility] in February 2021.  They agreed with our treatment plan for palliative chemotherapy followed by maintenance chemotherapy.    27. PET scan examination on 4/6/2021 after cycle #12 palliative chemotherapy reported no  evidence of hypermetabolic metastatic lesion.   28. Patient was started on maintenance chemotherapy with 5-FU and Avastin on 4/13/2021. (Unable to tolerate Xeloda because of a significant hand-foot syndrome).       HISTORY OF PRESENT ILLNESS:  The patient is a 40 y.o. female the above-mentioned history who is here today for lab review and evaluation prior to her maintenance chemotherapy with 5-FU, leucovorin, Avastin.      Overall, she is feeling well.  She does continue to have some mild hyperpigmentation on her hands and feet bilaterally.  She is trying to aggressively moisturize her hands and feet with utter balm with urea.  She does have 1 small area of peeling on her thumb, otherwise no fissuring, no pain, blistering.    Her ostomy is functioning well.  She continues on Lovenox and denies any bleeding issues.    Patient does have an area on her tailbone that she states has been sore recently.  She notices it if she is sitting or tries to sit up straight it is tender however no persistent discomfort in this area.    Past Medical History:   Diagnosis Date   • Abdominopelvic abscess (CMS/HCC) 08/12/2020    ADMITTED TO [facility]   • Abnormal Pap smear of cervix 02/02/1998    JULIUS I   • Anemia in pregnancy    • Anxiety    • Bilateral epiphora 04/2020   • Bilateral hand swelling 05/02/2020    SEEN AT [facility] ER   • Cervical lymphadenitis 06/06/2012    SEEN AT [facility] ER   • Chemotherapy induced diarrhea 01/2021   • Chemotherapy induced neutropenia (CMS/HCC) 04/2020   • Chemotherapy-induced nausea 02/2020   • Chemotherapy-induced thrombocytopenia 05/2020   • Chronic diarrhea    • Colon polyps     FOLLOWED BY DR. GERONIMO ESPARZA   • Cystitis 04/24/2020    WITH DEHYDRATION, ADMITTED TO [facility]   • Cystitis 10/27/2020   • Depression    • Drug-induced peripheral neuropathy (CMS/HCC) 05/2020   • Factor V Leiden mutation (CMS/HCC)    • Fistula of intestine    • GERD (gastroesophageal reflux disease)    • Hand foot syndrome 05/2020   • Heart  murmur     IN CHILDHOOD   • Hematochezia    • Hemorrhoids    • Heterozygous factor V Leiden mutation (CMS/[facility])     DX 2019   • History of anemia    • History of chemotherapy     FOLLOWED BY DR. ALEXANDRU HUNT   • History of gestational diabetes    • History of pre-eclampsia    • History of radiation therapy     FOLLOWED BY DR. JAVON LEWIS   • History of TB skin testing 2009    TB Skin Test   • HPV in female    • Hypokalemia 2019   • Hypomagnesemia 2019   • Hyponatremia 2021   • IBS (irritable bowel syndrome)    • Ileostomy in place (CMS/[facility])     FOLLOWED BY DR. GERONIMO ESPARZA   • Infected insect bite of neck 2016    SEEN AT [facility]   • Kidney stones 2007    SEEN AT [facility] ER   • Lump of right breast     SWOLLEN LYMPH NODE   • Lung cancer (CMS/[facility]ly [facility]) 2020    METASTATIC LUNG CANCER   • Macrocytosis 2021   • Monopolar depression (CMS/[facility])    • On anticoagulant therapy    • Palmar-plantar erythrodysesthesia 2021   • Perirectal abscess 2020   • PIH (pregnancy induced hypertension) 1998   • Pulmonary embolism without acute cor pulmonale (CMS/[facility]) 09/20/2019    X 3; ADMITTED TO [facility]   • Pulmonary nodule, right 2020   • Rectal cancer (CMS/[facility]) 2019    STAGE IIA, INVASIVE MODERATELY DIFFERENTIATED ADENOCARCINOMA, CHEMO AND XRT FINISHED 2019   • Right shoulder pain 2020    SEEN AT [facility] ER   • Right ureteral stone 10/01/2019    SEEN AT [facility] ER   • SAB (spontaneous ) 1996     A2-1 INDUCED   • Sciatica    • Sepsis due to cellulitis (CMS/[facility]) 2002    LEFT GREAT TOE, ADMITTED TO [facility]   • Tachycardia 2020   • 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 JAMEY TALAVERA AT [facility]   • COLON RESECTION N/A 2019    Procedure:  laparoscopic low anterior colon resection with mobilization of splenic flexure and diverting loop ileostomy: ERAS;  Surgeon: Padmaja Esparza MD;  Location: [facility] MAIN OR;  Service: General   • COLON RESECTION N/A 8/3/2020    Procedure: LOW ANTERIOR COLON RESECTION, COLOANAL ANASTOMOSIS, MOBILIZATION SPLENIC FLEXURE;  Surgeon: Padmaja Esparza MD;  Location: [facility] MAIN OR;  Service: General;  Laterality: N/A;   • COLONOSCOPY N/A 7/15/2020    PATENT ANASTAMOSIS IN MID RECTUM, RESCOPE IN 1 YR, DR. PADMAJA ESPARZA AT [facility]   • COLONOSCOPY W/ POLYPECTOMY N/A 07/08/2019    15 MM TUBULOVILLOUS ADENOMA POLYP IN HEPATIC FLEXURE, 20 MMTUBULOVILLOUS ADENOMA WITH HIGH GRADE DYSPLASIA IN RECTOSIGMOID, 4 CM MASS IN MID RECTUM, PATH: INVASIVE MODERATELY DIFFERENTIATED ADENOCARCINOMA, DR. JENNIFER LI AT [facility]   • DILATATION AND EVACUATION N/A 2009   • ENDOSCOPY N/A 07/08/2019    LA GRADE A ESOPHAGITIS, GASTRITIS, ALL BIOPSIES BENIGN, DR. JENNIFER LI AT [facility]   • INCISION AND DRAINAGE PERIRECTAL ABSCESS N/A 8/14/2020    Procedure: INCISION AND DRAINAGE OF retrorectal dehiscence abcess with drain placement and irrigation;  Surgeon: Padmaja Esparza MD;  Location: [facility] MAIN OR;  Service: General;  Laterality: N/A;   • PAP SMEAR N/A 01/24/2014   • SIGMOIDOSCOPY N/A 7/24/2019    INFILTRATIVE PARTIALLY OBSTRUCTING LARGE RECTAL CANCER, AREA TATOOED, DR. PADMAJA ESPARZA AT [facility]   • SIGMOIDOSCOPY N/A 11/23/2019    AN ULCERATED PARTIALLY OBSTRUCTING MASS IN MID RECTUM, AREA TATTOOED, DR. PADMAJA ESPARZA AT [facility]   • TRANSRECTAL ULTRASOUND N/A 7/24/2019    Procedure: ULTRASOUND TRANSRECTAL;  Surgeon: Padmaja Esparza MD;  Location: [facility] ENDOSCOPY;  Service: General   • URETEROSCOPY LASER LITHOTRIPSY WITH STENT INSERTION Right 10/30/2019    DR. ESTUARDO BEASLEY AT [place]   • VAGINAL DELIVERY N/A 09/18/1998   • VENOUS ACCESS DEVICE (PORT) INSERTION Left 8/9/2019    Procedure: INSERTION VENOUS ACCESS DEVICE;  Surgeon:  Sj Joseph MD;  [facility] [place] [facility];  Service: General   • VENOUS ACCESS DEVICE (PORT) INSERTION N/A 12/18/2020    Procedure: INSERTION VENOUS ACCESS DEVICE right side, removal venous access device left side;  Surgeon: Sj Joseph MD;  [facility] [place] [facility];  Service: General;  Laterality: N/A;   • WISDOM TOOTH EXTRACTION Bilateral 1993       HEMATOLOGIC/ONCOLOGIC HISTORY:      The patient reports she has intermittent rectal bleeding and urgency, mucous with her stool, starting sometime in 2016. At that time she was referred to GI service, and was diagnosed of irritable bowel syndrome. Nevertheless she had worsening urgency for bowel movement, and had a couple of incidents losing control of stool. She was recently seen by Roland Thorpe MD again and had colonoscopy and EGD exam on 07/08/2019. She was found having a circumferential rectal mass. According to the procedure note, the patient had a fungating circumferential bleeding 4 cm mass in the middle rectum, at distance between 13 cm and 17 cm from the anus. Mass was causing partial obstruction. There were also colon polyps found at the hepatic flexure and also at the rectosigmoid junction 23 cm from the anus. Both were resected and retrieved. EGD examination reported grade A esophagitis at the GE junction and patchy discontinuous erythema and aggravation of the mucosa without bleeding in the stomach body. There is normal mucosa of the duodenum. Pathology evaluation from this procedure reported moderately differentiated adenocarcinoma involving the rectal mass. The rectal sigmoid junction polyp was tubular/tubulovillous adenoma with high grade dysplasia negative for invasive malignancy. The hepatic flexure polyp was also tubular/tubulovillous adenoma negative for high grade dysplasia or malignancy. The biopsy from the esophagus reports squamocolumnar mucosa with inflammatory changes suggestive of mild reflux esophagitis, negative for interstitial  metaplasia. Gastric biopsy was benign and duodenal biopsy was also benign. There is no mention of H-pylori.     Patient was subsequently referred to colorectal surgeon Padmaja Ye MD for further evaluation. The patient had CT scan examination for chest, abdomen and pelvis requested by Dr. Ye and were done on 07/13/2019. The study reported no evidence of lymphadenopathy in the abdomen and pelvis. There was wall thickening of the rectosigmoid junction. Normal spleen, pancreas, adrenal glands and kidneys. There was fatty liver infiltration but no focal lymphatic lesions. There was a small 6-7 mm noncalcified nodule in the right upper lobe and a tiny 3 mm subpleural nodule in the right middle lobe. No mediastinal or hilar lymphadenopathy.     Dr. Ye performed staging rectal ultrasound examination. This study reported tumor penetrating through the muscularis propria as T3 disease; there were no lymph nodes identified.    She had a hospitalization in late September 2019 because of newly discovered pulmonary emboli.  She was on prophylactic Lovenox prior to the incident of PE.  Now she is on full dose Xarelto anticoagulation.  Patient reports no further chest pain dyspnea.  She denies bleeding or bruising.  During her hospitalization, she was seen by Dr. Ye, who plans to have surgery 8 to 12 weeks after finishing radiation therapy.  She finished her radiation on 9/19/2019.     I noticed patient also presented to the emergency room on 10/1/2019 complaining of right flank area pain.  I reviewed the images studies and indeed she has a very small 1 to 2 mm obstructing kidney stone in the UV junction.  Patient is still symptomatic with some pains and dysuria.  She denies fever sweating or chills.    Laboratory study on 10/7/2019 reported normal CBC including hemoglobin 13.1, platelets 301,000, WBC 6170 and ANC 4900 lymphocytes 590 monocytes 480.      The nurse reported malfunction of port-a-catheter on 10/7/2019.   Port study on 10/8/2019 showed fibrin sheath around the distal aspect of the Mediport catheter in the SVC. This does not appear to hinder injection, but does prevent aspiration at this time.    Patient underwent surgical resection of the colon on 12/2/2019 with Dr. Ye.  Pathology evaluation reported residual T3 disease, also 1 out of 14 lymph nodes positive for malignancy.  So this patient in either had at least stage IIIb disease (T3 N1 M0?).      Adjuvant chemotherapy FOLFOX 6 will be started on 1/22/2020.  Laboratory study reported iron saturation 10%, free iron 31 TIBC 319 and ferritin 168.  Her hemoglobin was 11.8, WBC 5600, and platelets 347,000.    Patient was here on 02/12/2020 for cycle 2 of her FOLFOX.  This is delayed x1 week secondary to neutropenia.  The patient ultimately received 3 days worth of Neupogen with recovery of her blood counts.  Of note, the patient struggled with significant nausea without any episodes of vomiting following cycle #1 of chemotherapy resulting in significant weight loss.  She is up 12 pounds over the last week as her appetite has normalized.  We will add Emend to her care plan.    She is having several loose stools per her colostomy and has been hesitant to take Imodium due to prior history of constipation.  I encouraged her to try this with a maximum of 8 tablets/day.  She denies any infectious symptoms including fevers or chills.  No excess bleeding or bruising noted.  She had the expected cold sensitivity related to the Oxaliplatin for about 3 days following treatment.    Labs from 02/12/2020 demonstrated total white blood cell count of 5.14, ANC of 3.06, hemoglobin of 11.2, platelets of 211,000.  She was found to be iron deficient last week and is intolerant to oral iron secondary to GI distress.  For this reason, she initiated IV iron therapy with Injectafer last week.  She had received her second dose 02/12/2020    Patient has normalized hemoglobin 12.2 on  2/26/2020.    She reported on 5/5/2020 she had a recent visit to the emergency department for what was suspected to be an allergic reaction.  She called our on-call service on Saturday with reports of hand and face swelling.  She was instructed to proceed to the emergency department at which time she was assessed and prescribed a Medrol Dosepak.  She had just completed her 5-FU and was unhooked on Friday, 5/3/2020.  Her symptoms have improved.  She does report persistent hyperpigmentation and mild swelling of the palms of the hands but this is much improved.  It was her right hand specifically that was swollen.  Her facial swelling has resolved.  She continues on Cefdinir nd since has 1 day remaining, she was prescribed cefdinir for a UTI requiring hospitalization at the end of April.  Reports no new symptoms.  Her labs are stable.  She is scheduled for treatment again.    Patient states at this time she is not tolerating her chemotherapy well.     Patient seen in the emergency department on 5/2/2020 for what was suspected to be an allergic reaction.  She called our on-call service on Saturday explaining that she was experiencing hand and face swelling.  She was instructed to proceed to the emergency department at which time she was assessed and prescribed a Medrol Dosepak.  She had just completed her 5-FU and was unhooked on Friday, 5/1/2020.      She reports since her ED visit on 5/2/2020 her symptoms have not improved. Her hands and feet were swollen upon presenting to the ED and she could barely make a fist. She states that she feels so swollen she is not able to stand it. She states that her skin on the hands and feet are peeling extensively as well, besides changing color to more dark.     She also reports significant fatigue after her first week of the 5-FU treatment but she expected this side effect. She also notices significant increase in her neuropathy to the point that she is not able to even walk around  in her home without her house slippers due to irritation from her rugs. She denies and associated nausea or vomiting at this time. She also has episodes of epistaxis every day after the chemotherapy cycle #7.  She does report working full-time during the week of chemotherapy.    Laboratory studies, 5/13/2020, show moderate thrombocytopenia platelets 123,000, low normal WBC 4140 including ANC 2720 and normal hemoglobin 13.4.  Because significant hand-foot syndrome, will decrease both 5-FU and oxaliplatin by 50%, and eliminate bolus dose of 5-FU.    On 5/21/2020 patient had a PET scan performed which showed no convincing evidence of residual disease in abdomen or pelvis, no metastatic disease within the chest or neck.     Cycle #8 FOLFOX 6 was given on 5/13/2020, with 50% dose reduction for both 5-FU and oxaliplatin, and also examination of bolus 5-FU.  She states on 5/27/2020 that with the recent reduction of the chemotherapy she feels significantly better. She has more energy and is able to do her daily routine.      PET scan examination on 5/21/2020 reported no evidence of metastatic disease in chest abdomen pelvis.  There was a stable 6 mm right upper lobe pulmonary nodule.    Laboratory studies on 5/27/2020 showed mild leukocytopenia WBC 3720 but a normal ANC 2250 and lymphocytes 630.  Normal platelets 163,000 and hemoglobin 12.6.  Chemistry lab reported normal renal function, liver function panel and a electrolytes, elevated glucose 164.    Laboratory studies 6/24/2020, showed normal hemoglobin 13.4 but macrocytosis .9.  Normal platelets 210,000 and WBC 5870.  She had normal CMP.     Patient last time was here in late June 2020.  Since that time she had surgical procedure for low anterior colon resection, coloanal anastomosis on 8/3/2020.  She later developed a perirectal abscess, required surgical drainage on 8/14/2020.  She was discharged on 8/27/2020.    Patient reports to me she still has lower  abdominal wall vacuum suction in place.  She denies fever sweating chills.  Performance status is ECOG 1.  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  She does have visiting nurse come to the home for wound care.    CT scan for chest abdomen pelvis on 9/9/2020 reported a new 8 mm noncalcified pulmonary nodule in the left lower lobe.  Otherwise stable right upper lobe 6 mm pulmonary nodule, and no evidence of disease recurrence in the abdomen or pelvis.     Laboratory study on 9/16/2020 reported elevated AST 55, ALT 95, alk phosphatase 143 but normal total bilirubin 0.3.  Chemistry lab otherwise unremarkable.  She has completed normal CBC.      Because of the abnormal CT scan examination for chest abdomen pelvis on 9/9/2020 reported a new 8 mm noncalcified pulmonary nodule in the left lower lobe, we obtained a PET scan examination for further evaluation, which was done on 9/18/2020.  This study reported several pulmonary nodules, some of them are hypermetabolic, newly developed compared to previous PET scan in May 2020.  Certainly does highly suspicious for metastatic disease.  There are also hypermetabolic activity in the abdomen and pelvic area which is hard to differentiate from surgical scar versus metastatic malignancy.    Laboratory study on 10/13/2020 reported normal CBC with Hb 13.9, platelets 302,000 and WBC 5520 including ANC 3830.  Chemistry lab reported normalized AST 20, alk phosphatase 116 improved ALT 35, and maintains normal bilirubin, with normal electrolytes and liver function panel.     Patient was started on first cycle of palliative chemotherapy FOLFIRI on 10/13/2020.    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  Patient had a new port placed 12/18/2020, and had held her Xarelto for 2 days prior to surgery.   She presented to the ER on 12/21/20 with complaints of facial and arm swelling for 3 days.  She also noted increased shortness of breath.  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  Thrombus within the right internal jugular vein cannot be excluded.  Patient was started on IV heparin, and eventually transitioned to weight-based Lovenox, which she now continues.      MEDICATIONS    Current Outpatient Medications:   •  clonazePAM (KlonoPIN) 1 MG tablet, TAKE 1 TABLET BY MOUTH THREE TIMES A DAY AS NEEDED FOR ANXIETY OR SEIZURES, Disp: 90 tablet, Rfl: 0  •  dicyclomine (BENTYL) 20 MG tablet, TAKE 1 TABLET BY MOUTH EVERY 6 HOURS AS NEEDED, Disp: 360 tablet, Rfl: 0  •  diphenoxylate-atropine (LOMOTIL) 2.5-0.025 MG per tablet, TAKE 1 TABLET BY MOUTH 4 (FOUR) TIMES A DAY AS NEEDED FOR DIARRHEA., Disp: 120 tablet, Rfl: 1  •  enoxaparin (Lovenox) 100 MG/ML solution syringe, Inject 1 mL under the skin into the appropriate area as directed Every 12 (Twelve) Hours., Disp: 60 mL, Rfl: 2  •  escitalopram (LEXAPRO) 10 MG tablet, TAKE 1 TABLET BY MOUTH EVERY DAY, Disp: 30 tablet, Rfl: 5  •  famotidine (PEPCID) 20 MG tablet, TAKE 1 TABLET BY MOUTH TWICE A DAY, Disp: 180 tablet, Rfl: 1  •  Loratadine (CLARITIN) 10 MG capsule, Take 10 mg by mouth Every Evening., Disp: , Rfl:   •  mineral oil-hydrophilic petrolatum (AQUAPHOR) ointment, Apply 1 application topically to the appropriate area as directed As Needed for Dry Skin., Disp: , Rfl:   •  nystatin (MYCOSTATIN) 573975 UNIT/GM cream, Apply  topically to the appropriate area as directed 2 (Two) Times a Day As Needed (rash)., Disp: 30 g, Rfl: 2  •  ondansetron (ZOFRAN) 8 MG tablet, Take 1 tablet by mouth 3 (Three) Times a Day As Needed for Nausea or Vomiting., Disp: 60 tablet, Rfl: 5  •  prochlorperazine (COMPAZINE) 10 MG tablet, Take 1 tablet by mouth Every 6 (Six) Hours As Needed for Nausea or Vomiting., Disp: 30 tablet, Rfl: 2  •  promethazine  (PHENERGAN) 25 MG tablet, Take 1 tablet by mouth Every 6 (Six) Hours As Needed for Nausea or Vomiting., Disp: 60 tablet, Rfl: 2  No current facility-administered medications for this visit.    Facility-Administered Medications Ordered in Other Visits:   •  bevacizumab (AVASTIN) 900 mg in sodium chloride 0.9 % 136 mL chemo IVPB, 900 mg, Intravenous, Once, Sheba Matias, APRN  •  fluorouracil (ADRUCIL) 4,850 mg, sodium chloride 0.9 % 143 mL 240 mL chemo infusion - FOR HOME USE, 2,400 mg/m2 (Treatment Plan Recorded), Intravenous, Once, Sheba Matias APRN  •  FOSAPREPITANT 150 MG/100ML NORMAL SALINE (CBC) IVPB 100 mL 100 mL, 150 mg, Intravenous, Once, Sheba Matias APRN, Last Rate: 200 mL/hr at 07/20/21 1230, 100 mL at 07/20/21 1230  •  leucovorin 810 mg in sodium chloride 0.9 % 290.5 mL IVPB, 400 mg/m2 (Treatment Plan Recorded), Intravenous, Once, Sheba Matias, APRN    ALLERGIES:   No Known Allergies    SOCIAL HISTORY:       Social History     Tobacco Use   • Smoking status: Current Every Day Smoker     Packs/day: 0.50     Years: 24.00     Pack years: 12.00     Types: Electronic Cigarette, Cigarettes   • Smokeless tobacco: Never Used   Vaping Use   • Vaping Use: Some days   • Substances: Nicotine   • Devices: Disposable   Substance Use Topics   • Alcohol use: Not Currently     Comment: Caffeine use rare   • Drug use: No         FAMILY HISTORY:   Mother has positive factor V Leiden mutation, although she did not have thrombosis, mother also is coronary disease with stenting, she also is occasional bruising.  Maternal grandmother had DVT, she had multiple surgical procedures.  Patient mother's half-brother had metastatic colon cancer at diagnosis in his 50s.  Maternal great aunt has breast cancer.  Patient will follow his skin cancer in his 60s, exclusive.    Review of Systems   Constitutional: Negative for activity change, appetite change, chills, diaphoresis, fatigue, fever and unexpected  "weight change.        She works full-time in a dental clinic.   HENT: Negative for mouth sores and sore throat.    Eyes: Negative for visual disturbance.   Respiratory: Negative for cough and shortness of breath.    Cardiovascular: Negative for chest pain and leg swelling.   Gastrointestinal: Negative for abdominal distention, abdominal pain, blood in stool and nausea.   Endocrine: Negative for cold intolerance.   Genitourinary: Negative for dysuria and hematuria.   Musculoskeletal: Negative for arthralgias and joint swelling.   Skin: Positive for color change (Hands and feet bilaterally). Negative for rash (This has resolved).        See HPI   Allergic/Immunologic: Positive for immunocompromised state (Chemotherapy).   Neurological: Negative for dizziness and light-headedness.   Hematological: Negative for adenopathy. Bruises/bleeds easily (Easy bruising at the site of injection of Lovenox ).   Psychiatric/Behavioral: Negative for agitation and confusion.       07/20/2021 ROS unchanged from above except as updated.       Vitals:    07/20/21 1057   BP: 135/90   Pulse: 109   Resp: 16   Temp: 97.5 °F (36.4 °C)   TempSrc: Temporal   SpO2: 97%   Weight: 87.2 kg (192 lb 4.8 oz)   Height: 166 cm (65.35\")   PainSc: 0-No pain  Comment: rectal cancer     Current Status 6/8/2021   ECOG score 0     Physical Exam  Vitals reviewed.   Constitutional:       General: She is not in acute distress.     Appearance: Normal appearance. She is well-developed.   HENT:      Head: Normocephalic and atraumatic.      Mouth/Throat:      Pharynx: No oropharyngeal exudate.   Eyes:      Pupils: Pupils are equal, round, and reactive to light.   Cardiovascular:      Rate and Rhythm: Normal rate and regular rhythm.      Heart sounds: Normal heart sounds. No murmur heard.     Pulmonary:      Effort: Pulmonary effort is normal. No respiratory distress.      Breath sounds: Normal breath sounds. No wheezing, rhonchi or rales.   Abdominal:      General: " Bowel sounds are normal. There is no distension.      Palpations: Abdomen is soft.      Comments: Ostomy in right abdomen with liquid dark green stool.  Scattered bruises on the abdomen bilaterally from Lovenox injections.   Musculoskeletal:         General: Normal range of motion.      Cervical back: Normal range of motion.   Skin:     General: Skin is warm and dry.      Findings: No rash.      Comments: Prominent tailbone with a small 2 mm fissure no signs of infection.   Neurological:      Mental Status: She is alert and oriented to person, place, and time.     07/20/2021 physical exam is unchanged from above except as updated.    RECENT LABS:  Results from last 7 days   Lab Units 07/20/21  1124   WBC 10*3/mm3 5.38   NEUTROS ABS 10*3/mm3 3.72   HEMOGLOBIN g/dL 15.5   HEMATOCRIT % 46.2   PLATELETS 10*3/mm3 178       Results from last 7 days   Lab Units 07/20/21  1124   SODIUM mmol/L 136   POTASSIUM mmol/L 3.7   CHLORIDE mmol/L 100   CO2 mmol/L 23.7   BUN mg/dL 8   CREATININE mg/dL 0.81   CALCIUM mg/dL 9.3   ALBUMIN g/dL 3.60   BILIRUBIN mg/dL 0.3   ALK PHOS U/L 91   ALT (SGPT) U/L 44*   AST (SGOT) U/L 30   GLUCOSE mg/dL 157*           Assessment/Plan      ASSESSMENT:   1.  Rectal cancer, rectal ultrasound examination reported T3N0 disease without lymphadenopathy.   · CT scan of chest, abdomen and pelvis reported no lymphadenopathy in the abdomen, pelvis or chest. She does have small pulmonary nodule 6-7 mm and 2 mm with indeterminate feature. There is no solid evidence of metastatic disease otherwise.   · Based on the CT scan and rectal ultrasound imaging studies, this patient had stage IIA (T3N0M0) disease.   · She had PET scan examination on 8/8/2019 which reported a hypermetabolic right upper lobe pulmonary nodule 6 mm with SUV 5.6.  This is suspicious for metastatic disease however it is too small to be biopsied.  This patient may have stage IV disease.   · She initiated concurrent radiation with continuous  5-FU on 8/12/2019.  · Patient finished concurrent chemoradiation therapy.  · Patient underwent surgical resection of the rectal tumor and diverting loop ileostomy on 12/2/2019 with Dr. Ye.  Pathology evaluation reported residual T3 disease, with 1 out of 14 lymph nodes positive for malignancy.  Certainly this patient has at least stage IIIb rectal cancer (T3 N1 M0?)  · On 1/22/2020 adjuvant chemotherapy FOLFOX 6 regimen initiated.    · On 2/5/2022 cycle #2 was delayed secondary to neutropenia.  She was given 3 days of Granix.   · Emend added with cycle 3.  With improved nausea control  · Continuing home Granix x3 days following 5-FU disconnect  · 3/11/2020 due for cycle 4, however, she is experiencing progressive abdominal pain and occasional fevers.   · CT scan performed on 3/13/2020 reveals no evidence of progressive or recurrent disease.  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  · Patient hospitalized 4/24-4/26/20 after cycle 5 of chemotherapy with acute UTI.  CT abdomen/pelvis noting fluid collection in the presacral region having diminished in size compared to CT dated 3/13/2020.  Patient was evaluated by Dr. Ye while in the hospital with further plans to evaluate possible colovaginal fistula following completion of chemotherapy.  Patient did respond to IV antibiotics and discharged home on oral cefdinir.  · 4/29/2020 cycle 6 of FOLFOX.  Urinary symptoms have resolved   · Patient seen in the [facility] ED on 5/2/2020 for what was suspected to be an allergic reaction.  She called our service on Saturday explaining that she was experiencing hand and face swelling.  She was instructed to proceed to the emergency department at which time she was assessed and prescribed a Medrol Dosepak.  She had just completed her 5-FU and was unhooked on Friday, 5/1/2020.  Her symptoms have resolved in the office on assessment, 5/5/2020.  · The patient had grade 3 hand-foot syndrome based on symptomology.  Patient had  cycle #8 of 5-FU on 5/13/2020. Due to her symptoms and poor tolerance to the 5-FU, her treatment dose will be reduced 50% for oxaliplatin and infusional 5-FU.  Bolus 5-FU will be discontinued..  · On 5/13/2020  We discussed further treatment options pending the scan results.  If she has indeed residual disease or metastatic disease, we will switch her to irinotecan plus Avastin or anti-EGFR monoclonal antibody.  She will need a further molecular testing of her tumor samples in that case.  · On 5/21/2020 patient had a PET scan and it showed no evidence of of metastatic disease in the neck, chest, abdomen or pelvis.  There was fluid accumulation/abscess.   · On 5/27/2020 I discussed with the patient that we can discontinue chemotherapy at this time.  She will follow-up with Dr. Ye to discuss a possibility to reverse the ileostomy.  We likely will obtain anther PET scan in 3 to 4 months for reassessment.    · Patient was seen by Dr. Ye on 6/19/2019 for evaluation and to discuss possible take down of her ileostomy.  She is scheduled to have a gastrografin enema on 7/2/2020 to evaluate for a fistula, and then a colonoscopy on 7/15/2020, both done by Dr. Ye.  She states that based on the above imaging and procedure findings, she may have a more extensive surgery done or just have her ileostomy reversed, which would likely be done in August 2020.  This will all be coordinated under the care of Dr. Ye.     · I reviewed scan results with the patient on 9/16/2020 for the most recent CT scan from 09/09/2020 and also compared to her previous PET scan examination from 05/21/2020 and also the original PET scan from 08/09/2019.  The original PET scan there is a small right upper lobe pulmonary nodule 6 mm with SUV 5.6. So in the 05/2020 PET scan the nodule is still there but activity seems much less and this most recent CT scan examination also documented the preexisting small pulmonary nodule however there is a new  left lower lobe pulmonary nodule 8 mm in size and I shared with the patient today this nodule is suspicious for metastatic disease. Laboratory studies reported minimal CEA level 1.32 on 09/09/2020. Liver function panel was only marginally abnormal with the ALT 45, the remaining is normal. However laboratory study today showed worsening AST 55, ALT 95 and alkaline phosphatase 143 but is still normal, total bilirubin 0.3.   · So I discussed with the patient on 9/16/2020 recommended to have repeat PET scan examination for assessment because the left lower lobe pulmonary nodule is too small to be biopsied, and if the PET scan reports hypermetabolic lesion, I recommended to have stereotactic radiation therapy. Explained to patient that she is not a really good candidate to have thoracotomy for resection because she still has unhealed wound in the abdomen. I think the stereotactic radiation therapy will be a more feasible choice.  · Laboratory study on 9/16/2020 reported worsening liver function panel with both elevated AST, ALT and also slightly worsened alkaline phosphatase despite having normal total bilirubin. I recommended to repeat laboratory study for reevaluation. The only new medication she has in the past several days is Augmentin but otherwise no change of condition. She denies any recent viral infection. We will monitor this.   · PET scan examination obtained on 9/18/2020 showed metastatic disease.  It reported a few hypermetabolic pulmonary nodules, and some new small pulmonary nodules, all of them highly suspicious for metastatic disease.  She also has hypermetabolic activity in the abdomen and pelvic area which could be related to scar tissue from her recent surgery versus metastatic disease.  Nevertheless overall picture fits with metastatic cancer.  · Discussed with the patient on 9/20/2020, we reviewed the PET scan images together, and I recommended to have systematic chemotherapy, I do not think  stereotactic reading therapy to the hypermetabolic lesions in the lungs warranted at this time because even those will be treated with reading therapy, she will still need systematic chemotherapy.  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  I did discuss with the patient using anti-EGFR monoclonal antibody versus anti-VEGF monoclonal antibody.     · Palliative chemotherapy cycle#1 FOLFIRI was started on 10/13/2020.  No bolus 5-FU given considering previous poor tolerance.    · NGS study from [facility] reported positive for [place] mutation.  Microsatellite stable, mutation burden 5/Mb.   · Discussed with the patient 10/27/2020, because of the K-[place] mutation, she is not a candidate for anti-EGFR monoclonal antibody such as Erbitux or vectibix.  She could be a candidate for anti-VEGF monoclonal antibody, however because of active wound, she is not a candidate right now at this moment.  · Patient seen in the ED for acute right neck pain on 11/16/2020.  CT angiogram as well as CT of the cervical spine performed with no acute findings but notably one of her pulmonary nodules, left upper lobe, somewhat decreased in size.  Patient given prednisone pack.  Further details below.  · Patient due today for cycle #4 FOLFIRI.  Plan repeat PET scan after cycle 6.  · Last received cycle #5 chemotherapy without irinotecan on 12/8/2020.   · Patient here 12/29/2020 for evaluation and consideration of cycle 6, but she continues to complain of swelling.  She does have swelling typically after treatment as well.  We will hold treatment for 1 week and reevaluate next week.  Still planning on PET scan following cycle 6.   · Cycle #6 chemotherapy will be resumed on 1/5/2021.  Started back on original irinotecan.  · PET scan examination obtained on 1/12/2021 after cycle #6 chemotherapy reported improved pulmonary nodule hypermetabolic activity.  Confirmed these are metastatic lesions.  · Patient  is evaluated 1/19/2020, will continue cycle #7 FOLFIRI chemotherapy.  However Avastin will be on hold see next section.   · Patient was reevaluated on 2/2/2021, will continue chemotherapy cycle #8 FOLFIRI.  Avastin / biosimilar will be added.    · She talked to [facility] specialist in mid February 2021, and had recommended palliative chemotherapy without surgical intervention of metastatic lung lesions.   · 2/16/2021cycle #9 FOLFIRI plus bevacizumab.  Tolerated last cycle well with only some mild increase in liquid stools.  This was easily controlled with antidiarrheals.  · On 3/2/2021, patient will proceed with cycle #10 FOLFIRI plus bevacizumab.  After 12 cycles, plan to start maintenance treatment.  · 3/16/2021 cycle 11.  Plus bevacizumab.  · 3/30/2021 cycle 12 FOLFIRI plus bevacizumab.  Having issues with worsening nausea despite premeds with dexamethasone, Aloxi, Emend, and Zofran at home.  Patient is requesting a dose of Phenergan, which is helped her in the past.  We will give this to her with treatment today.  · PET scan examination on 4/6/2021 reported no evidence of hypermetabolic metastatic lesion.  · Discussed with the patient on 4/13/2021, we reviewed the PET scan results.  We recommended to switch to maintenance chemotherapy without irinotecan.  We will continue 5-FU and Avastin every 2 weeks.  We discussed possibility of switching to oral Xeloda treatment.  Discussed with the patient for side effects more so with hand-foot syndrome.  Patient is agreeable.   · Xeloda 2000 mg twice daily 7 days on, 7 days off along with Avastin initiated 4/27/2021.  · After only 5 doses of Xeloda significant hand-foot syndrome, Xeloda held.  Dose adjustment versus transition to 5-FU will be further discussed with Dr. Nguyen  · Patient returns 5/11/2021 for reevaluation.  She did take her first dose of Xeloda 1500 mg this morning, but is actually requesting to be transition back to infusional  5-FU, as she felt that she tolerated infusional 5-FU without any problem.  The patient will receive 5-FU leucovorin and Avastin every 2 weeks.  Xeloda will be discontinued.  · 5/25/2021 continued cycle #4 maintenance 5-FU, leucovorin, Avastin tolerating this much better so far, we will continue this.  · On 6/8/2021, will proceed ahead with cycle #5 maintenance chemotherapy with infusional 5-FU, leucovorin and Avastin.  · 6/22/2021 continues on maintenance chemotherapy with infusional 5-FU, leucovorin, Avastin tolerating well.  · 7/6/2021, proceed with maintenance chemotherapy cycle #7.  I discussed with the patient, recommended to have 12 cycles of maintenance chemotherapy, then obtain PET scan for reevaluation.  We could discuss further treatment plan at that time.  · 7/20/2021 continues with maintenance chemotherapy with 5-FU, leucovorin, Avastin tolerating well.  This will be her eighth cycle.  Plans to repeat a PET scan after a total of 12 cycles maintenance.    2 .  Pulmonary nodule, hypermetabolic on PET scan.   · Her original PET scan examination from 8/8/2019 reported a small 8 mm right upper apex pulmonary nodule but hypermetabolic SUV 5.1.  That was too small to be biopsied, however there was always a possibility of metastatic disease considering that high activity despite a such a small nodule.  · Her chest CT scan examination from 3/13/2020 reported this shrank to 6 mm.    · PET scan examination on 5/21/2020 reported no metabolic activity at this residual nodule.  This needs to be monitored.    · PET scan examination 9/18/2020 reported couple of hypermetabolic pulmonary nodules, besides a few extra small pulmonary nodules.  Those are highly suspicious for metastatic disease.    · PET scan examination obtained on 1/12/2021 after cycle #6 chemotherapy reported improved a pulmonary nodule hypermetabolic activity.  Confirmed these are metastatic lesions.  · 1/19/2021, patient reports she will see thoracic  surgeon tomorrow at the [facility] where she seeks second opinion evaluation, and to see whether she would be a candidate for resection of pulmonary nodules.  I discussed with the patient, she had at least 2 hypermetabolic lesions although they responded to chemotherapy, I do not think she would be a candidate for surgery.   · Thoracic surgeon from [facility] recommended metastectomy and to discontinue chemotherapy afterwards.   · Patient had a third opinion evaluation on 2/11/2021 by telemedicine with [facility] [place], who recommended palliative chemotherapy with no surgical intervention for metastatic pulmonary lesions.  Patient was agreeable with this strategy.      3.   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  Low-dose Lovenox discontinued and initiated Xarelto 20 mg daily.  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  · Patient held Xarelto for 2 days prior to MediPort removal from the left chest wall and placement of new port in the right chest wall on 12/18/2020.  She resumed Xarelto that evening.  · Progressive swelling in the neck, face, upper extremities prompting hospitalization with CT scan showing thrombosis in the right subclavian vein and SVC.  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  Although she had been off of Xarelto for a few days, clearly Xarelto was not prohibiting  progression of her thrombosis after she resumed treatment and there was some evidence to suggest thrombosis had been present at least in the innominate vein on prior scan in September but now appears chronic.  Current acute thrombosis involving SVC and right subclavian.  · Patient was admitted and placed on heparin drip  · Patient was evaluated by vascular surgery and intervention was not recommended for thrombolysis/thrombectomy.  · On 12/22/2020, the patient developed worsening shortness of breath, increasing upper extremity edema consistent with worsening SVC syndrome.  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  · Symptoms improved and patient was discharged 12/24/2020 on Lovenox 100 mg every 12 hours.    · Returns 12/29/2020 for evaluation continuing Lovenox, overall tolerating it well.  No bleeding issues.  · Improved edema 1/5/2021.  Will continue Lovenox weight-based every 12 hours.  · On 1/19/2021 and 2/2/2021, patient reports improved facial swelling.  She however does have being bruise on her abdomen wall where she does Lovenox injection.  However she does not have a spontaneous epistaxis, gum bleeding or other bleeding.   · On 2/2/2021, we discussed that side effects of Avastin/biosimilar related to thrombosis.  Since this patient already has been on Lovenox, I think the benefits from treatment for her cancer will outweigh that risk of thrombosis, will proceed ahead with Avastin.  I cautioned patient to watch out for signs of worsening thrombosis patient voiced understanding.   · On 3/16/2021, no evidence of worsening DVT, continue Lovenox.  · 5/11/2021 Lovenox dosing will be adjusted due to patient's weight loss.  We discussed she needs 1 mg/kg.  Her syringes are 100 mg syringes she will do 0.9 mL subcu every 12 hours.  · On 6/8/2021 and 7/6/2021, patient continues to have easy bruising  at the site of Lovenox injection, otherwise no spontaneous bleeding.      4.  This patient also has strong family history for malignancy the patient had appointment with genetic counseling on September 3, 2019.  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    5.  Mild anemia, improved since her surgery.    · She also has a history of iron deficiency.  Iron studies reveal a saturation of just 10%.  She was started on oral iron but was unable to tolerate due to GI side effects.   · Status post Injectafer 2/5/2020 and 2/12/2020   · Improved hemoglobin 13.5 on 1/5/2021.  · Hemoglobin 14.7 on 2/16/2021.    · Hemoglobin 16.2 on 3/2/2021.    · 3/16/2020 when hemoglobin now up to 16.2, hematocrit 47.6.  Patient admits that she has started smoking again, and I have encouraged her to quit.  She denies any snoring or sleep apnea diagnosis.  Patient is not taking oral iron.  We will closely monitor.  · 3/30/2020 hemoglobin 15.7, HCT 47.5.  Patient states that she has cut back significantly on her smoking, although not quit yet.  I have encouraged her to continue working on this.  We will continue to closely monitor.  · Hemoglobin 14.6 on 6/8/2021 at the meantime he has worsening macrocytosis .6.    · Laboratory studies 6/22/2021 reported excellent folate more than 20 ng/mL, however low normal B12 level 357,000.  · On 7/6/2021, normal hemoglobin 15.8, .9 and HCT 47.2%.  · 7/20/2021 hemoglobin is 15.5.  Continue to monitor.    6.  Peripheral neuropathy secondary to chemotherapy.   · This is mild involving bilateral hands and feet as reported on 9/16/2020.   · Will avoid chemotherapy with oxaliplatin.  · Stable mild neuropathy as of 11/10/2020  · Patient reports worsening peripheral neuropathy and cycle #5 chemotherapy on 12/8/2020 with and without irinotecan.   · On 1/5/2021, patient reports improvement of peripheral neuropathy, will add irinotecan back to chemotherapy.  Continue to monitor and adjust  medication.  · On 3/2/2021, patient reports no worsening of peripheral neuropathy after restarting irinotecan.   · As of 7/6/2021 her neuropathy is stable.    7.  History of hand-foot syndrome grade 3.    · It become so significant after cycle #7 FOLFOX treatment.  Discussed with patient on 5/13/2020, will discontinue the bolus 5-FU dose, and also decrease 50% of the infusion dose through the pump.   · We also use Medrol Dosepak for possible recurrent symptomology.  She responded this well.   · Her hand-foot syndrome improved.  · Under current treatment with FOLFIRI, patient not receiving 5-FU bolus.  No recurrent issues.   · Patient will be switched to maintenance chemotherapy using Xeloda in late April 2021.  · Following 5 doses of Xeloda, significant hand-foot syndrome with pain in the feet, significant erythema.  Xeloda held.  Will be further discussed with Dr. Nguyen to determine if the patient will resume 5-FU versus a dose reduction to Xeloda.  · Aggressive emollient moisturizer use twice daily for the past week and patient reports improvement in the dryness and erythema.  Xeloda will now be discontinued and patient will switch back to 5-FU.  · As of 5/25/2021 dryness and erythema/hyperpigmentation continues to improve.  Xeloda has been discontinued.  · 6/8/2021, patient reports much improved hand-foot syndrome, with remnant pigmentation on palms.  · On 7/6/2021, patient reports and had a some flareup of hand-foot syndrome, however improved after aggressive moisturizing cream and the urea cream.  Overall much tolerated infusional 5-FU compared to Xeloda.    · 7/20/2021 continues to have mild hyperpigmentation of her hands and feet bilaterally, she continues aggressive moisturization with utter balm with urea.  She also reports some soreness of her tailbone, and we discussed that this is likely due to loss of weight and muscle mass.  I advised her to go ahead and apply moisturizer to this area as there is one  tiny fissure.  Also recommended using a waffle pad or doughnut to sit on.    8.  Hyperlacrimation and mild scleral irritation related to 5-FU.  She has been taking steroid eyedrops.  This has improved her hyperlacrimation.  · Not currently an issue.  Continue to monitor with 5-FU.      9.  Abnormal liver function panel.  · Improved liver function panel 9/18/2020.  This is probably related to her recent infection.  · She has normalized AST, alk phosphatase, and a much improved only marginally elevated ALT 35 on 10/13/2020.    · Normalized liver function panel on 10/27/2020.    · Normal liver panel on 11/10/2020.    · Normal liver function panel on 12/5/2021.    · Slightly worsening liver function with AST 33 and ALT 56 on 1/19/2021.    · Improved AST 21 and ALT 39 on 2/2/2021.    · ALT 59 and AST 29 on 3/2/2021.    · ALT 56, AST 24, alk phosphatase 121 and total bilirubin 0.3 on 4/13/2021.    · 6/8/2021, only marginally elevated ALT 48 otherwise normal liver function panel.    · On 7/6/2021, ALT 42, normal AST ALT and total bilirubin.  Continue to monitor.   · 7/20/2021 AST 30, ALT 44, total bilirubin 0.3.  Continue to monitor.    10.  Intermittent leukocytopenia secondary to chemotherapy.   · WBC currently normal at 5220 and the neutrophil 3520 on 1/5/2021.  Continue to monitor.    · On 2/2/2021, WBC 4110 including ANC 2540.  Continue to monitor for now.  Consider G-CSF if neutropenia becomes an ongoing issue.   · 2/16/2021 WBC 4.6, ANC 2.79.    · 3/16/2021 WBC 3.93, ANC 2.26, continue to monitor.  · On 4/13/2021 WBC 4250 including ANC 2640.  · 5/25/2021 WBC 4.21 ANC 2.35.    · 6/22/2021 WBC stable at 6.07, ANC 4.33.  · 7/6/2021 WBC 5120 and ANC 3450.  Continue to monitor.  · 7/20/2021 WBC 5.38, ANC 3.72.    11.  Depression.  Patient seen by JULIET Davenport on 11/9/2020.  She was started on mirtazapine.  Lexapro was discontinued.  This has definitely improved her mood with the patient feeling overall much  better.  She is sleeping better.  Appetite is improved with her actually eating more than she wants to.    · Condition is stable.  She plans to continue follow-up with supportive oncology.    12.  Intermittent upper abdominal discomfort.  This improves with eating.  After further discussion with the patient she also notes 3 nights of increased reflux when laying down.  We discussed her symptoms could be related to increase stomach acid or potential ulcer.  She is currently taking Pepcid 20 mg daily.  I instructed her to add Prilosec 20 mg daily.   · On 3/16/2021 patient reports some worsening reflux symptoms last week.  She has increased Pepcid to 20 mg twice daily, which did resolve her symptoms.  We discussed she could add Prilosec 20 mg daily as well.   · No complaint today.  Continue to monitor.    13.  Proteinuria: 3/30/2020: Patient is 2+ protein in her urine.  We will continue with Avastin and closely monitor.  No need for 24-hour urine at this time.  · Persistent 2+ proteinuria on 4/13/2021.  continue to monitor.  · 5/25/2021 protein urine only 1+.    · 6/22/2021 persistent 1+ proteinuria.  Continue to monitor.  · 7/6/2021, trace protein.  Continue Avastin treatment and monitoring.  · 7/20/2020 1+ protein.  Continue to monitor.    14.  Tachycardia:   · Patient was seen by Dr. Bustos cardiologist on 4/6/2021.  We will continue to follow-up in 3 months.    15.  Candidiasis:  · Present in the skin fold of the abdomen.  I prescribed nystatin cream.  This has resolved.    PLAN:  1. Proceed with cycle 8 maintenance chemotherapy with infusional 5-FU, leucovorin, and Avastin today.    2. Continue aggressive moisturizing cessation to hands and feet.  3. Discussed using a waffle pad or doughnut to sit on for tailbone discomfort.  We also discussed using moisturizer on this area.  4. Continue Lovenox 1 mg/kg twice daily.  5. Continue antiemetics if needed for nausea.  6. Continue oral B12 1000 mcg daily.  7. Return  in 2 weeks for follow-up visit with nurse practitioner for reevaluation prior to cycle 9 maintenance infusional 5-FU, leucovorin, Avastin.  8. Return in 4 weeks for follow-up visit with Dr. Nguyen with repeat labs and reevaluation prior to cycle 10 maintenance infusional 5-FU, leucovorin, Avastin.  9. Plan to repeat a PET scan following 12 maintenance treatments.  At that timecould consider give her a short chemotherapy holiday, or change maintenance chemotherapy to every 3 weeks or even every 4 weeks.  We discussed this after PET scan.  10. Call/return sooner should she palpate new concerns or problems.      Patient on high risk medication requiring close monitoring for toxicity.    Sheba Matias, APRN  07/20/21      CC:  Deepika Vela III NP-C   MD Perla Bowers MD

## 2021-07-21 DIAGNOSIS — F41.9 ANXIETY: ICD-10-CM

## 2021-07-21 RX ORDER — CLONAZEPAM 1 MG/1
TABLET ORAL
Qty: 90 TABLET | Refills: 0 | Status: SHIPPED | OUTPATIENT
Start: 2021-07-21 | End: 2021-10-22

## 2021-07-21 NOTE — TELEPHONE ENCOUNTER
Enoxaparin refill request rec electronically from [facility]. Pt was seen yesterday by Sheba Matias NP and per her note-pt is to continue 1 mg/kg. Pts weight as of yesterday is 87.2 kg. Message sent to Sheba Matias NP to clarify dosing as the rx stated for pt to inject 1 ml (100 mg)    Sheba Matias APRN sent to Priti Echeverria.  Her and I had talked a while back about doing 0.9 ml because of weight loss.  So I would stick with that dose for now.     Thanks,     Sheba           Previous Messages       ----- Message -----   From: Priti Echeverria   Sent: 7/21/2021   1:13 PM EDT   To: JULIET Muñoz   Subject: Enoxaparin dose                                   I have rec a refill for Enoxaparin for Carole. Last time this was sent in she was to inject the full 100 mg BID. Your note from yesterday states 1 mg/kg-as of yesterday she is 87.2 kg. Should her dose decrease or remain the same?     Let me know when you have a moment.     Thank you,   Priti     I have updated the rx and routed to Dr Nguyen for signature. Once signed it will be escribed to [facility]

## 2021-07-22 ENCOUNTER — OFFICE VISIT (OUTPATIENT)
Dept: CARDIOLOGY | Facility: CLINIC | Age: 41
End: 2021-07-22

## 2021-07-22 ENCOUNTER — ANESTHESIA EVENT (OUTPATIENT)
Dept: GASTROENTEROLOGY | Facility: HOSPITAL | Age: 41
End: 2021-07-22

## 2021-07-22 ENCOUNTER — INFUSION (OUTPATIENT)
Dept: ONCOLOGY | Facility: HOSPITAL | Age: 41
End: 2021-07-22

## 2021-07-22 ENCOUNTER — HOSPITAL ENCOUNTER (OUTPATIENT)
Facility: HOSPITAL | Age: 41
Setting detail: HOSPITAL OUTPATIENT SURGERY
Discharge: HOME | End: 2021-07-22
Attending: COLON & RECTAL SURGERY | Admitting: COLON & RECTAL SURGERY

## 2021-07-22 ENCOUNTER — ANESTHESIA (OUTPATIENT)
Dept: GASTROENTEROLOGY | Facility: HOSPITAL | Age: 41
End: 2021-07-22

## 2021-07-22 VITALS
OXYGEN SATURATION: 96 % | BODY MASS INDEX: 31.8 KG/M2 | WEIGHT: 194.06 LBS | SYSTOLIC BLOOD PRESSURE: 138 MMHG | HEART RATE: 71 BPM | RESPIRATION RATE: 16 BRPM | DIASTOLIC BLOOD PRESSURE: 102 MMHG | TEMPERATURE: 98.2 F

## 2021-07-22 VITALS
WEIGHT: 195 LBS | SYSTOLIC BLOOD PRESSURE: 130 MMHG | HEART RATE: 78 BPM | DIASTOLIC BLOOD PRESSURE: 80 MMHG | HEIGHT: 66 IN | BODY MASS INDEX: 31.34 KG/M2

## 2021-07-22 DIAGNOSIS — D68.51 HETEROZYGOUS FACTOR V LEIDEN MUTATION (HCC): Chronic | ICD-10-CM

## 2021-07-22 DIAGNOSIS — I10 ESSENTIAL HYPERTENSION: Primary | Chronic | ICD-10-CM

## 2021-07-22 DIAGNOSIS — Z79.01 CHRONIC ANTICOAGULATION: Chronic | ICD-10-CM

## 2021-07-22 DIAGNOSIS — Z45.2 ENCOUNTER FOR FITTING AND ADJUSTMENT OF VASCULAR CATHETER: Primary | ICD-10-CM

## 2021-07-22 DIAGNOSIS — R00.0 TACHYCARDIA: Chronic | ICD-10-CM

## 2021-07-22 DIAGNOSIS — C20 ADENOCARCINOMA OF RECTUM (HCC): Chronic | ICD-10-CM

## 2021-07-22 PROCEDURE — 99214 OFFICE O/P EST MOD 30 MIN: CPT | Performed by: INTERNAL MEDICINE

## 2021-07-22 PROCEDURE — 25010000002 PROPOFOL 10 MG/ML EMULSION: Performed by: ANESTHESIOLOGY

## 2021-07-22 PROCEDURE — 45330 DIAGNOSTIC SIGMOIDOSCOPY: CPT | Performed by: COLON & RECTAL SURGERY

## 2021-07-22 PROCEDURE — 25010000002 HEPARIN LOCK FLUSH PER 10 UNITS: Performed by: INTERNAL MEDICINE

## 2021-07-22 RX ORDER — PROPOFOL 10 MG/ML
VIAL (ML) INTRAVENOUS AS NEEDED
Status: DISCONTINUED | OUTPATIENT
Start: 2021-07-22 | End: 2021-07-22 | Stop reason: SURG

## 2021-07-22 RX ORDER — PROPOFOL 10 MG/ML
VIAL (ML) INTRAVENOUS CONTINUOUS PRN
Status: DISCONTINUED | OUTPATIENT
Start: 2021-07-22 | End: 2021-07-22 | Stop reason: SURG

## 2021-07-22 RX ORDER — LIDOCAINE HYDROCHLORIDE 20 MG/ML
INJECTION, SOLUTION INFILTRATION; PERINEURAL AS NEEDED
Status: DISCONTINUED | OUTPATIENT
Start: 2021-07-22 | End: 2021-07-22 | Stop reason: SURG

## 2021-07-22 RX ORDER — HEPARIN SODIUM (PORCINE) LOCK FLUSH IV SOLN 100 UNIT/ML 100 UNIT/ML
500 SOLUTION INTRAVENOUS AS NEEDED
Status: CANCELLED | OUTPATIENT
Start: 2021-07-22

## 2021-07-22 RX ORDER — HEPARIN SODIUM (PORCINE) LOCK FLUSH IV SOLN 100 UNIT/ML 100 UNIT/ML
500 SOLUTION INTRAVENOUS AS NEEDED
Status: DISCONTINUED | OUTPATIENT
Start: 2021-07-22 | End: 2021-07-22 | Stop reason: HOSPADM

## 2021-07-22 RX ORDER — SODIUM CHLORIDE 0.9 % (FLUSH) 0.9 %
10 SYRINGE (ML) INJECTION AS NEEDED
Status: CANCELLED | OUTPATIENT
Start: 2021-07-22

## 2021-07-22 RX ORDER — SODIUM CHLORIDE, SODIUM LACTATE, POTASSIUM CHLORIDE, CALCIUM CHLORIDE 600; 310; 30; 20 MG/100ML; MG/100ML; MG/100ML; MG/100ML
30 INJECTION, SOLUTION INTRAVENOUS CONTINUOUS PRN
Status: DISCONTINUED | OUTPATIENT
Start: 2021-07-22 | End: 2021-07-22 | Stop reason: HOSPADM

## 2021-07-22 RX ORDER — SODIUM CHLORIDE 0.9 % (FLUSH) 0.9 %
10 SYRINGE (ML) INJECTION AS NEEDED
Status: DISCONTINUED | OUTPATIENT
Start: 2021-07-22 | End: 2021-07-22 | Stop reason: HOSPADM

## 2021-07-22 RX ADMIN — SODIUM CHLORIDE, PRESERVATIVE FREE 10 ML: 5 INJECTION INTRAVENOUS at 11:59

## 2021-07-22 RX ADMIN — Medication 70 MG: at 15:56

## 2021-07-22 RX ADMIN — Medication 500 UNITS: at 11:58

## 2021-07-22 RX ADMIN — PROPOFOL 180 MCG/KG/MIN: 10 INJECTION, EMULSION INTRAVENOUS at 15:55

## 2021-07-22 RX ADMIN — SODIUM CHLORIDE, POTASSIUM CHLORIDE, SODIUM LACTATE AND CALCIUM CHLORIDE 30 ML/HR: 600; 310; 30; 20 INJECTION, SOLUTION INTRAVENOUS at 14:46

## 2021-07-22 RX ADMIN — LIDOCAINE HYDROCHLORIDE 60 MG: 20 INJECTION, SOLUTION INFILTRATION; PERINEURAL at 15:54

## 2021-07-22 NOTE — ANESTHESIA PREPROCEDURE EVALUATION
Anesthesia Evaluation     Patient summary reviewed and Nursing notes reviewed   no history of anesthetic complications:  NPO Solid Status: > 6 hours  NPO Liquid Status: > 6 hours           Airway   Mallampati: II  TM distance: >3 FB  Neck ROM: full  no difficulty expected and No difficulty expected  Dental - normal exam     Pulmonary - normal exam    breath sounds clear to auscultation  (+) pulmonary embolism, lung cancer,   (-) rhonchi, decreased breath sounds, wheezes, rales, stridor  Cardiovascular - normal exam    NYHA Classification: I  ECG reviewed  Rhythm: regular  Rate: normal    (+) hypertension, PVD,   (-) murmur, weak pulses, friction rub, systolic click, carotid bruits, JVD, peripheral edema      Neuro/Psych  (+) numbness, psychiatric history Anxiety and Depression,     GI/Hepatic/Renal/Endo    (+)  GERD, GI bleeding , renal disease stones,     Musculoskeletal (-) negative ROS    Abdominal  - normal exam    Abdomen: soft.   Substance History - negative use     OB/GYN negative ob/gyn ROS         Other   blood dyscrasia anemia,   history of cancer active      Other Comment: Factor V Leiden                  Anesthesia Plan    ASA 3     MAC     intravenous induction     Anesthetic plan, all risks, benefits, and alternatives have been provided, discussed and informed consent has been obtained with: patient.

## 2021-07-22 NOTE — H&P
Carole Weaver is a 40 y.o. female  who is referred by Geronimo Ye MD for a colonoscopy. She   has an indications: previous colon cancer.     She denies any change in bowel function, melena, or hematochezia.    Past Medical History:   Diagnosis Date   • Abdominopelvic abscess (CMS/HCC) 08/12/2020    ADMITTED TO [facility]   • Abnormal Pap smear of cervix 02/02/1998    JULIUS I   • Anemia in pregnancy    • Anxiety    • Bilateral epiphora 04/2020   • Bilateral hand swelling 05/02/2020    SEEN AT [facility] ER   • Cervical lymphadenitis 06/06/2012    SEEN AT [facility] ER   • Chemotherapy induced diarrhea 01/2021   • Chemotherapy induced neutropenia (CMS/HCC) 04/2020   • Chemotherapy-induced nausea 02/2020   • Chemotherapy-induced thrombocytopenia 05/2020   • Chronic diarrhea    • Colon polyps     FOLLOWED BY DR. GERONIMO YE   • Cystitis 04/24/2020    WITH DEHYDRATION, ADMITTED TO [facility]   • Cystitis 10/27/2020   • Depression    • Drug-induced peripheral neuropathy (CMS/HCC) 05/2020   • Factor V Leiden mutation (CMS/[facility])    • Fistula of intestine    • GERD (gastroesophageal reflux disease)    • Hand foot syndrome 05/2020   • Heart murmur     IN CHILDHOOD   • Hematochezia    • Hemorrhoids    • Heterozygous factor V Leiden mutation (CMS/HCC)     DX 8-2-2019   • History of anemia    • History of chemotherapy 2019    FOLLOWED BY DR. ALEXANDRU HUNT   • History of gestational diabetes    • History of pre-eclampsia    • History of pre-eclampsia 1998   • History of radiation therapy 2019    FOLLOWED BY DR. JAVON LEWIS   • History of TB skin testing 01/17/2009    TB Skin Test   • HPV in female 1998   • Hypokalemia 09/2019   • Hypomagnesemia 09/2019   • Hyponatremia 06/2021   • IBS (irritable bowel syndrome)    • Ileostomy in place (CMS/[facility])     FOLLOWED BY DR. GERONIMO YE   • Infected insect bite of neck 05/27/2016    SEEN AT [facility]   • Kidney stones 08/09/2007    SEEN AT [facility] ER   • Lump of right breast     SWOLLEN LYMPH NODE   • Lung cancer  (CMS/HCC) 2020    METASTATIC LUNG CANCER   • Macrocytosis 2021   • Monopolar depression (CMS/HCC)    • On anticoagulant therapy    • Palmar-plantar erythrodysesthesia 2021   • Perirectal abscess 2020   • Pulmonary embolism without acute cor pulmonale (CMS/HCC) 09/20/2019    X 3; ADMITTED TO [facility]   • Pulmonary nodule, right 2020   • Rectal cancer (CMS/HCC) 2019    STAGE IIA, INVASIVE MODERATELY DIFFERENTIATED ADENOCARCINOMA, CHEMO AND XRT FINISHED 2019   • Right shoulder pain 2020    SEEN AT [facility] ER   • Right ureteral stone 10/01/2019    SEEN AT [facility] ER   • SAB (spontaneous ) 1996     A2-1 INDUCED   • Sciatica    • Sepsis due to cellulitis (CMS/HCC) 2002    LEFT GREAT TOE, ADMITTED TO [facility]   • Tachycardia 2020   • Thrombosis of superior vena cava (CMS/HCC) 2020    AND BRACHIOCEPHALIC VEIN, ADMITTED TO [facility]   • Urinary urgency 2020       Past Surgical History:   Procedure Laterality Date   • ABDOMINAL SURGERY     • CHOLECYSTECTOMY N/A 10/10/2003    LAPAROSCOPIC WITH CHOLANGIOGRAM, DR. JAMEY TALAVERA AT [facility]   • COLON RESECTION N/A 2019    Procedure: laparoscopic low anterior colon resection with mobilization of splenic flexure and diverting loop ileostomy: ERAS;  Surgeon: Padmaja Esparza MD;  Location: [facility];  Service: General   • COLON RESECTION N/A 8/3/2020    Procedure: LOW ANTERIOR COLON RESECTION, COLOANAL ANASTOMOSIS, MOBILIZATION SPLENIC FLEXURE;  Surgeon: Padmaja Esparza MD;  Location: [facility] OR;  Service: General;  Laterality: N/A;   • COLONOSCOPY N/A 7/15/2020    PATENT ANASTAMOSIS IN MID RECTUM, RESCOPE IN 1 YR, DR. PADMAJA ESPARZA AT [facility]   • COLONOSCOPY W/ POLYPECTOMY N/A 2019    15 MM TUBULOVILLOUS ADENOMA POLYP IN HEPATIC FLEXURE, 20 MMTUBULOVILLOUS ADENOMA WITH HIGH GRADE DYSPLASIA IN RECTOSIGMOID, 4 CM MASS IN MID RECTUM, PATH: INVASIVE MODERATELY DIFFERENTIATED ADENOCARCINOMA, DR. JENNIFER LI AT [facility]   • DILATATION AND EVACUATION N/A 2009   • ENDOSCOPY N/A 07/08/2019    LA GRADE A ESOPHAGITIS, GASTRITIS, ALL BIOPSIES BENIGN, DR. JENNIFER LI AT [facility]   • INCISION AND DRAINAGE PERIRECTAL ABSCESS N/A 8/14/2020    Procedure: INCISION AND DRAINAGE OF retrorectal dehiscence abcess with drain placement and irrigation;  Surgeon: Geronimo Ye MD;  Location: [facility] MAIN OR;  Service: General;  Laterality: N/A;   • PAP SMEAR N/A 01/24/2014   • SIGMOIDOSCOPY N/A 7/24/2019    INFILTRATIVE PARTIALLY OBSTRUCTING LARGE RECTAL CANCER, AREA TATOOED, DR. GERONIMO YE AT [facility]   • SIGMOIDOSCOPY N/A 11/23/2019    AN ULCERATED PARTIALLY OBSTRUCTING MASS IN MID RECTUM, AREA TATTOOED, DR. GERONIMO YE AT [facility]   • TRANSRECTAL ULTRASOUND N/A 7/24/2019    Procedure: ULTRASOUND TRANSRECTAL;  Surgeon: Geronimo Ye MD;  Location: [facility] ENDOSCOPY;  Service: General   • URETEROSCOPY LASER LITHOTRIPSY WITH STENT INSERTION Right 10/30/2019    DR. ESTUARDO BEASLEY AT [place]   • VAGINAL DELIVERY N/A 09/18/1998   • VENOUS ACCESS DEVICE (PORT) INSERTION Left 8/9/2019    Procedure: INSERTION VENOUS ACCESS DEVICE;  Surgeon: Sj Joseph MD;  [place] [facility] [place] [facility];  Service: General   • VENOUS ACCESS DEVICE (PORT) INSERTION N/A 12/18/2020    Procedure: INSERTION VENOUS ACCESS DEVICE right side, removal venous access device left side;  Surgeon: Sj Joseph MD;  [place] [facility];  Service: General;  Laterality: N/A;   • WISDOM TOOTH EXTRACTION Bilateral 1993       Medications Prior to Admission   Medication Sig Dispense Refill Last Dose   • clonazePAM (KlonoPIN) 1 MG tablet TAKE 1 TABLET BY MOUTH THREE TIMES A DAY AS NEEDED FOR ANXIETY OR SEIZURES 90 tablet 0 7/21/2021 at Unknown time   • Cyanocobalamin (VITAMIN B-12 PO) Take  by mouth.   7/21/2021 at Unknown time   • dicyclomine (BENTYL) 20 MG tablet TAKE 1 TABLET BY MOUTH EVERY 6 HOURS AS NEEDED 360 tablet 0 7/21/2021 at Unknown time   •  diphenoxylate-atropine (LOMOTIL) 2.5-0.025 MG per tablet TAKE 1 TABLET BY MOUTH 4 (FOUR) TIMES A DAY AS NEEDED FOR DIARRHEA. 120 tablet 1 7/21/2021 at Unknown time   • escitalopram (LEXAPRO) 10 MG tablet TAKE 1 TABLET BY MOUTH EVERY DAY 30 tablet 5 7/21/2021 at Unknown time   • famotidine (PEPCID) 20 MG tablet TAKE 1 TABLET BY MOUTH TWICE A  tablet 1 7/21/2021 at Unknown time   • fluorouracil in dextrose 5 % 250 mL infusion Infuse  into a venous catheter 1 (One) Time. Takes twice a month.   7/21/2021 at Unknown time   • Loratadine (CLARITIN) 10 MG capsule Take 10 mg by mouth Every Evening.   7/21/2021 at Unknown time   • nystatin (MYCOSTATIN) 240721 UNIT/GM cream Apply  topically to the appropriate area as directed 2 (Two) Times a Day As Needed (rash). 30 g 2 Past Week at Unknown time   • ondansetron (ZOFRAN) 8 MG tablet Take 1 tablet by mouth 3 (Three) Times a Day As Needed for Nausea or Vomiting. 60 tablet 5 7/21/2021 at Unknown time   • promethazine (PHENERGAN) 25 MG tablet Take 1 tablet by mouth Every 6 (Six) Hours As Needed for Nausea or Vomiting. 60 tablet 2 7/21/2021 at Unknown time   • enoxaparin (LOVENOX) 100 MG/ML solution syringe Inject 0.9 mL under the skin into the appropriate area as directed Every 12 (Twelve) Hours. 60 mL 2 7/22/2021 at 1100   • mineral oil-hydrophilic petrolatum (AQUAPHOR) ointment Apply 1 application topically to the appropriate area as directed As Needed for Dry Skin.      • prochlorperazine (COMPAZINE) 10 MG tablet Take 1 tablet by mouth Every 6 (Six) Hours As Needed for Nausea or Vomiting. 30 tablet 2        No Known Allergies    Family History   Problem Relation Age of Onset   • Hyperlipidemia Mother    • Colon polyps Mother    • Heart disease Mother    • Hypertension Mother    • Factor V Leiden deficiency Mother    • Skin cancer Father         Squamous in 60s   • Atrial fibrillation Father    • Heart disease Paternal Grandfather    • No Known Problems Son    • Cancer  Paternal Uncle    • Colon cancer Paternal Uncle    • Diabetes Paternal Uncle    • Mental illness Sister         Addiction   • Colon cancer Maternal Uncle    • Malig Hyperthermia Neg Hx        Social History     Socioeconomic History   • Marital status:      Spouse name: Justus   • Number of children: 1   • Years of education: College   • Highest education level: Not on file   Tobacco Use   • Smoking status: Current Every Day Smoker     Packs/day: 1.00     Years: 24.00     Pack years: 24.00     Types: Electronic Cigarette, Cigarettes   • Smokeless tobacco: Never Used   Vaping Use   • Vaping Use: Some days   • Substances: Nicotine   • Devices: Disposable   Substance and Sexual Activity   • Alcohol use: Not Currently     Comment: Caffeine use rare   • Drug use: No   • Sexual activity: Defer       Review of Systems   Gastrointestinal: Negative for abdominal pain, nausea and vomiting.   All other systems reviewed and are negative.      Vitals:    07/22/21 1419   BP: 120/88   Pulse: 78   Resp: 16   Temp: 98.2 °F (36.8 °C)   SpO2: 96%         Physical Exam  Constitutional:       Appearance: She is well-developed.   HENT:      Head: Normocephalic and atraumatic.   Eyes:      Pupils: Pupils are equal, round, and reactive to light.   Cardiovascular:      Rate and Rhythm: Regular rhythm.   Pulmonary:      Effort: Pulmonary effort is normal.   Abdominal:      General: There is no distension.      Palpations: Abdomen is soft.   Musculoskeletal:         General: Normal range of motion.   Skin:     General: Skin is warm and dry.   Neurological:      Mental Status: She is alert and oriented to person, place, and time.   Psychiatric:         Thought Content: Thought content normal.         Judgment: Judgment normal.           Assessment/Plan      indications: previous rectal cancer         I recommend colonoscopy.  I described risks, benefits of the procedure with the patient including but not limited to bleeding, infection,  possibility of perforation and possible polypectomy. All of the patient's questions were answered and they would like to proceed with the above recommendations.

## 2021-07-22 NOTE — DISCHARGE INSTRUCTIONS
For the next 24 hours patient needs to be with a responsible adult.    For 24 hours DO NOT drive, operate machinery, appliances, drink alcohol, make important decisions or sign legal documents.    Start with a light or bland diet if you are feeling sick to your stomach otherwise advance to regular diet as tolerated.    Follow recommendations on procedure report if provided by your doctor.    Call Dr Ye for problems [phone]    Problems may include but not limited to: large amounts of bleeding, trouble breathing, repeated vomiting, severe unrelieved pain, fever or chills.

## 2021-07-22 NOTE — ANESTHESIA POSTPROCEDURE EVALUATION
Patient: Carole Weaver    Procedure Summary     Date: 07/22/21 Room / Location:  [facility]    Anesthesia Start: 1551 Anesthesia Stop: 1612    Procedure: COLONOSCOPY TO TRANSVERSE COLO (N/A ) Diagnosis:       History of rectal cancer      (History of rectal cancer [Z85.048])    Surgeons: Padmaja Ye MD Provider: Rajeev Lenz MD    Anesthesia Type: MAC ASA Status: 3          Anesthesia Type: MAC    Vitals  No vitals data found for the desired time range.          Post Anesthesia Care and Evaluation    Patient location during evaluation: bedside  Patient participation: complete - patient participated  Level of consciousness: awake and alert  Pain management: adequate  Airway patency: patent  Anesthetic complications: No anesthetic complications    Cardiovascular status: acceptable  Respiratory status: acceptable  Hydration status: acceptable    Comments: /88 (BP Location: Left arm, Patient Position: Sitting)   Pulse 78   Temp 36.8 °C (98.2 °F) (Oral)   Resp 16   Wt 88 kg (194 lb 1 oz)   LMP 11/22/2019 (Within Weeks)   SpO2 96%   BMI 31.80 kg/m²

## 2021-07-22 NOTE — PROGRESS NOTES
Date of Office Visit: 2021  Encounter Provider: Tasha Bustos MD  Place of Service: [facility] CARDIOLOGY  Patient Name: Carole Weaver  :1980      Patient ID:  Carole Weaver is a 40 y.o. female is here for  followup for         History of Present Illness    She has a history of primary rectal cancer which was surgically resected by Dr. Ольга Ye 2019.  She was started on FOLFOX 6 (leucovorin, fluorouracil and oxaliplatin) 2020 followed by Dr Nguyen.  Follow-up PET scan 2020 showed no metastatic disease in the chest abdomen and pelvis but a stable 6 mm right upper lung pulmonary nodule.  She had a low anterior colon resection with coloanal anastomosis done 8/3/2020.  On 2020, there is a new 8 mm noncalcified pulmonary nodule in the left lower lobe of the lung.  The 6 mm prior pulmonary nodule stable.  PET scan on 2020 showed multiple hypermetabolic small pulmonary nodules.  She was started on pelvic chemotherapy with FOLFIRI (leucovorin, fluorouracil and irinotecan) on 10/13/2020.  She developed peripheral neuropathy and so irinotecan was stopped but then resumed.  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  She had telemedicine visit with [facility] 2021.  They evaluated her chemotherapeutic plan and agreed with treatment for palliative chemotherapy followed by maintenance chemotherapy.  She is on FOLFIRI and bevacizumab.      She is heterozygous for factor V Leiden and has been on prophylactic anticoagulation with Lovenox.  She had 3 pulmonary emboli diagnosed 2019 at that time she was on low-dose Lovenox.  She was then placed on Xarelto which was then made subtherapeutic as they lowered the dose.  She then developed SVC and left brachiocephalic thrombus and was switched to weight-based Lovenox every 12  hours.     I did review a CTA from 12/2020.  There is no coronary artery calcification.     He is , has 1 child is 22, works as an  and is still working full-time.  She is smoking half pack cigarettes a day uses no alcohol or drugs and has rare caffeine.     Her father has atrial fibrillation, mother has hypertension.  There is no family history of premature cancer.    Echocardiogram done 4/8/2021 showed ejection fraction 50%, low normal, mid anteroseptal wall hypokinesis, grade 1 diastolic dysfunction.  She still has mild exertional dyspnea but no chest pain or pressure.  She does not feel heart racing or skipping.  She has no orthopnea or PND.  Her energy level is low.  She is now getting leucovorin, 5-fluorouracil and bevacizumab twice monthly.  In October, hopefully she will be going to this treatment every 3 weeks.  She has upcoming testing to see her response to this.    Past Medical History:   Diagnosis Date   • Abdominopelvic abscess (CMS/HCC) 08/12/2020    ADMITTED TO [facility]   • Abnormal Pap smear of cervix 02/02/1998    JULIUS I   • Anemia in pregnancy    • Anxiety    • Bilateral epiphora 04/2020   • Bilateral hand swelling 05/02/2020    SEEN AT [facility] ER   • Cervical lymphadenitis 06/06/2012    SEEN AT [facility] ER   • Chemotherapy induced diarrhea 01/2021   • Chemotherapy induced neutropenia (CMS/HCC) 04/2020   • Chemotherapy-induced nausea 02/2020   • Chemotherapy-induced thrombocytopenia 05/2020   • Chronic diarrhea    • Colon polyps     FOLLOWED BY DR. GERONIMO ESPARZA   • Cystitis 04/24/2020    WITH DEHYDRATION, ADMITTED TO [facility]   • Cystitis 10/27/2020   • Depression    • Drug-induced peripheral neuropathy (CMS/HCC) 05/2020   • Factor V Leiden mutation (CMS/HCC)    • Fistula of intestine    • GERD (gastroesophageal reflux disease)    • Hand foot syndrome 05/2020   • Heart murmur     IN CHILDHOOD   • Hematochezia    • Hemorrhoids    • Heterozygous factor V Leiden mutation (CMS/HCC)     DX 8-2-2019    • History of anemia    • History of chemotherapy     FOLLOWED BY DR. ALEXANDRU HUNT   • History of gestational diabetes    • History of pre-eclampsia    • History of radiation therapy     FOLLOWED BY DR. JAVON LEWIS   • History of TB skin testing 2009    TB Skin Test   • HPV in female    • Hypokalemia 2019   • Hypomagnesemia 2019   • Hyponatremia 2021   • IBS (irritable bowel syndrome)    • Ileostomy in place (CMS/[facility])     FOLLOWED BY DR. GERONIMO ESPARZA   • Infected insect bite of neck 2016    SEEN AT [facility]   • Kidney stones 2007    SEEN AT [facility] ER   • Lump of right breast     SWOLLEN LYMPH NODE   • Lung cancer (CMS/[facility]) 2020    METASTATIC LUNG CANCER   • Macrocytosis 2021   • Monopolar depression (CMS/[facility])    • On anticoagulant therapy    • Palmar-plantar erythrodysesthesia 2021   • Perirectal abscess 2020   • PIH (pregnancy induced hypertension)    • Pulmonary embolism without acute cor pulmonale (CMS/[facility]) 09/20/2019    X 3; ADMITTED TO [facility]   • Pulmonary nodule, right 2020   • Rectal cancer (CMS/[facility]) 2019    STAGE IIA, INVASIVE MODERATELY DIFFERENTIATED ADENOCARCINOMA, CHEMO AND XRT FINISHED 2019   • Right shoulder pain 2020    SEEN AT [facility] ER   • Right ureteral stone 10/01/2019    SEEN AT [facility] ER   • SAB (spontaneous ) 1996     A2-1 INDUCED   • Sciatica    • Sepsis due to cellulitis (CMS/[facility]) 2002    LEFT GREAT TOE, ADMITTED TO [facility]   • Tachycardia 2020   • Thrombosis of superior vena cava (CMS/[facility]) 2020    AND BRACHIOCEPHALIC VEIN, ADMITTED TO [facility]   • Urinary urgency 2020         Past Surgical History:   Procedure Laterality Date   • CHOLECYSTECTOMY N/A 10/10/2003    LAPAROSCOPIC WITH CHOLANGIOGRAM, DR. JAMEY TALAVERA AT [facility]   • COLON RESECTION N/A 2019    Procedure: laparoscopic low anterior colon resection with mobilization of splenic flexure and diverting loop ileostomy: ERAS;   Surgeon: Geronimo Ye MD;  Location: [facility] MAIN OR;  Service: General   • COLON RESECTION N/A 8/3/2020    Procedure: LOW ANTERIOR COLON RESECTION, COLOANAL ANASTOMOSIS, MOBILIZATION SPLENIC FLEXURE;  Surgeon: Geronimo Ye MD;  Location: [facility] MAIN OR;  Service: General;  Laterality: N/A;   • COLONOSCOPY N/A 7/15/2020    PATENT ANASTAMOSIS IN MID RECTUM, RESCOPE IN 1 YR, DR. GERONIMO YE AT [facility]   • COLONOSCOPY W/ POLYPECTOMY N/A 07/08/2019    15 MM TUBULOVILLOUS ADENOMA POLYP IN HEPATIC FLEXURE, 20 MMTUBULOVILLOUS ADENOMA WITH HIGH GRADE DYSPLASIA IN RECTOSIGMOID, 4 CM MASS IN MID RECTUM, PATH: INVASIVE MODERATELY DIFFERENTIATED ADENOCARCINOMA, DR. JENNIFER LI AT [facility]   • DILATATION AND EVACUATION N/A 2009   • ENDOSCOPY N/A 07/08/2019    LA GRADE A ESOPHAGITIS, GASTRITIS, ALL BIOPSIES BENIGN, DR. JENNIFER LI AT [facility]   • INCISION AND DRAINAGE PERIRECTAL ABSCESS N/A 8/14/2020    Procedure: INCISION AND DRAINAGE OF retrorectal dehiscence abcess with drain placement and irrigation;  Surgeon: Geronimo Ye MD;  Location: [facility] MAIN OR;  Service: General;  Laterality: N/A;   • PAP SMEAR N/A 01/24/2014   • SIGMOIDOSCOPY N/A 7/24/2019    INFILTRATIVE PARTIALLY OBSTRUCTING LARGE RECTAL CANCER, AREA TATOOED, DR. GERNOIMO YE AT [facility]   • SIGMOIDOSCOPY N/A 11/23/2019    AN ULCERATED PARTIALLY OBSTRUCTING MASS IN MID RECTUM, AREA TATTOOED, DR. GERONIMO YE AT [facility]   • TRANSRECTAL ULTRASOUND N/A 7/24/2019    Procedure: ULTRASOUND TRANSRECTAL;  Surgeon: Geronimo Ye MD;  Location: [facility] ENDOSCOPY;  Service: General   • URETEROSCOPY LASER LITHOTRIPSY WITH STENT INSERTION Right 10/30/2019    DR. ESTUARDO BEASLEY AT [place]   • VAGINAL DELIVERY N/A 09/18/1998   • VENOUS ACCESS DEVICE (PORT) INSERTION Left 8/9/2019    Procedure: INSERTION VENOUS ACCESS DEVICE;  Surgeon: Sj Joseph MD;  Location: [facility] OR [facility];  Service: General   • VENOUS ACCESS DEVICE (PORT) INSERTION N/A  12/18/2020    Procedure: INSERTION VENOUS ACCESS DEVICE right side, removal venous access device left side;  Surgeon: Sj Joseph MD;  [facility] [place] [facility]  Service: General;  Laterality: N/A;   • WISDOM TOOTH EXTRACTION Bilateral 1993       Current Outpatient Medications on File Prior to Visit   Medication Sig Dispense Refill   • clonazePAM (KlonoPIN) 1 MG tablet TAKE 1 TABLET BY MOUTH THREE TIMES A DAY AS NEEDED FOR ANXIETY OR SEIZURES 90 tablet 0   • dicyclomine (BENTYL) 20 MG tablet TAKE 1 TABLET BY MOUTH EVERY 6 HOURS AS NEEDED 360 tablet 0   • diphenoxylate-atropine (LOMOTIL) 2.5-0.025 MG per tablet TAKE 1 TABLET BY MOUTH 4 (FOUR) TIMES A DAY AS NEEDED FOR DIARRHEA. 120 tablet 1   • enoxaparin (LOVENOX) 100 MG/ML solution syringe Inject 0.9 mL under the skin into the appropriate area as directed Every 12 (Twelve) Hours. 60 mL 2   • escitalopram (LEXAPRO) 10 MG tablet TAKE 1 TABLET BY MOUTH EVERY DAY 30 tablet 5   • famotidine (PEPCID) 20 MG tablet TAKE 1 TABLET BY MOUTH TWICE A  tablet 1   • Loratadine (CLARITIN) 10 MG capsule Take 10 mg by mouth Every Evening.     • mineral oil-hydrophilic petrolatum (AQUAPHOR) ointment Apply 1 application topically to the appropriate area as directed As Needed for Dry Skin.     • nystatin (MYCOSTATIN) 728401 UNIT/GM cream Apply  topically to the appropriate area as directed 2 (Two) Times a Day As Needed (rash). 30 g 2   • ondansetron (ZOFRAN) 8 MG tablet Take 1 tablet by mouth 3 (Three) Times a Day As Needed for Nausea or Vomiting. 60 tablet 5   • prochlorperazine (COMPAZINE) 10 MG tablet Take 1 tablet by mouth Every 6 (Six) Hours As Needed for Nausea or Vomiting. 30 tablet 2   • promethazine (PHENERGAN) 25 MG tablet Take 1 tablet by mouth Every 6 (Six) Hours As Needed for Nausea or Vomiting. 60 tablet 2     Current Facility-Administered Medications on File Prior to Visit   Medication Dose Route Frequency Provider Last Rate Last Admin   • heparin injection  "500 Units  500 Units Intravenous PRN Dong Nguyen MD PhD       • sodium chloride 0.9 % flush 10 mL  10 mL Intravenous PRN Dong Nguyen MD PhD           Social History     Socioeconomic History   • Marital status:      Spouse name: Justus   • Number of children: 1   • Years of education: College   • Highest education level: Not on file   Tobacco Use   • Smoking status: Current Every Day Smoker     Packs/day: 1.00     Years: 24.00     Pack years: 24.00     Types: Electronic Cigarette, Cigarettes   • Smokeless tobacco: Never Used   Vaping Use   • Vaping Use: Some days   • Substances: Nicotine   • Devices: Disposable   Substance and Sexual Activity   • Alcohol use: Not Currently     Comment: Caffeine use rare   • Drug use: No   • Sexual activity: Defer           ROS    Procedures  Procedures        Objective:      Vitals:    07/22/21 0842   BP: 130/80   BP Location: Left arm   Pulse: 78   Weight: 88.5 kg (195 lb)   Height: 166.4 cm (65.5\")     Body mass index is 31.96 kg/m².    Vitals reviewed.   Constitutional:       General: Not in acute distress.     Appearance: Well-developed. Not diaphoretic.   Eyes:      General: No scleral icterus.     Conjunctiva/sclera: Conjunctivae normal.   HENT:      Head: Normocephalic and atraumatic.   Neck:      Thyroid: No thyromegaly.      Vascular: No carotid bruit or JVD.      Lymphadenopathy: No cervical adenopathy.   Pulmonary:      Effort: Pulmonary effort is normal. No respiratory distress.      Breath sounds: Normal breath sounds. No wheezing. No rhonchi. No rales.   Chest:      Chest wall: Not tender to palpatation.   Cardiovascular:      Normal rate. Regular rhythm.      Murmurs: There is no murmur.      No gallop.   Pulses:     Intact distal pulses.   Edema:     Peripheral edema absent.   Abdominal:      General: Bowel sounds are normal. There is no distension or abdominal bruit.      Palpations: Abdomen is soft. There is no abdominal mass.      Tenderness: " There is no abdominal tenderness.      Comments: Colostomy in place   Musculoskeletal:         General: No deformity.      Extremities: No clubbing present.     Cervical back: Neck supple. Skin:     General: Skin is warm and dry. There is no cyanosis.      Coloration: Skin is not pale.      Findings: No rash.   Neurological:      Mental Status: Alert and oriented to person, place, and time.      Cranial Nerves: No cranial nerve deficit.   Psychiatric:         Judgment: Judgment normal.         Lab Review:       Assessment:      Diagnosis Plan   1. Essential hypertension     2. Tachycardia     3. Heterozygous factor V Leiden mutation (CMS/HCC)     4. Chronic anticoagulation     5. Adenocarcinoma of rectum (CMS/HCC)       1. Adenocarcinoma of the rectum, chemotherapeutics as noted above.  The bevacizumab (Avastin) is a recombinant antibody targeting VEGF receptor binding.  The fluorouracil as an antimetabolite.  The oxaliplatin is platinum chemotherapeutic.  These 3 in particular can increase risk of coronary artery disease, myocardial infarction and cardiomyopathy.  2. Factor V Leiden with history of DVTs and pulmonary emboli, on therapeutic Lovenox.  3. Tobacco use.     Plan:       See back in 3 months with an echo same day.  No med changes.

## 2021-07-23 DIAGNOSIS — K52.1 CHEMOTHERAPY INDUCED DIARRHEA: ICD-10-CM

## 2021-07-23 DIAGNOSIS — T45.1X5A CHEMOTHERAPY INDUCED DIARRHEA: ICD-10-CM

## 2021-07-23 RX ORDER — DIPHENOXYLATE HYDROCHLORIDE AND ATROPINE SULFATE 2.5; .025 MG/1; MG/1
1 TABLET ORAL 4 TIMES DAILY PRN
Qty: 120 TABLET | Refills: 1 | Status: SHIPPED | OUTPATIENT
Start: 2021-07-23 | End: 2021-09-27

## 2021-07-23 NOTE — TELEPHONE ENCOUNTER
Caller: Carole Weaver    Relationship: Self    Best call back number: [phone]  Medication needed:   Requested Prescriptions     Pending Prescriptions Disp Refills   • diphenoxylate-atropine (LOMOTIL) 2.5-0.025 MG per tablet 120 tablet 1     Sig: Take 1 tablet by mouth 4 (Four) Times a Day As Needed for Diarrhea.       When do you need the refill by: ASAP    What additional details did the patient provide when requesting the medication: PATIENT COMPLETELY OUT OF MEDICAITON    Does the patient have less than a 3 day supply:  [x] Yes  [] No    What is the patient's preferred pharmacy:      [facility]/pharmacy #[id number] [place], [place] - [address] [address]. AT [facility] - [phone] [facility] [phone]   [phone]

## 2021-08-03 ENCOUNTER — INFUSION (OUTPATIENT)
Dept: ONCOLOGY | Facility: HOSPITAL | Age: 41
End: 2021-08-03

## 2021-08-03 ENCOUNTER — TELEPHONE (OUTPATIENT)
Dept: CARDIOLOGY | Facility: CLINIC | Age: 41
End: 2021-08-03

## 2021-08-03 ENCOUNTER — OFFICE VISIT (OUTPATIENT)
Dept: ONCOLOGY | Facility: CLINIC | Age: 41
End: 2021-08-03

## 2021-08-03 VITALS
WEIGHT: 189.2 LBS | OXYGEN SATURATION: 97 % | TEMPERATURE: 97.3 F | SYSTOLIC BLOOD PRESSURE: 134 MMHG | HEIGHT: 65 IN | DIASTOLIC BLOOD PRESSURE: 94 MMHG | HEART RATE: 114 BPM | BODY MASS INDEX: 31.52 KG/M2 | RESPIRATION RATE: 16 BRPM

## 2021-08-03 VITALS — SYSTOLIC BLOOD PRESSURE: 113 MMHG | DIASTOLIC BLOOD PRESSURE: 83 MMHG | HEART RATE: 83 BPM

## 2021-08-03 DIAGNOSIS — T45.1X5A CHEMOTHERAPY INDUCED DIARRHEA: Primary | ICD-10-CM

## 2021-08-03 DIAGNOSIS — K52.1 CHEMOTHERAPY INDUCED DIARRHEA: Primary | ICD-10-CM

## 2021-08-03 DIAGNOSIS — C20 ADENOCARCINOMA OF RECTUM (HCC): ICD-10-CM

## 2021-08-03 DIAGNOSIS — C20 ADENOCARCINOMA OF RECTUM (HCC): Primary | ICD-10-CM

## 2021-08-03 DIAGNOSIS — C78.00 MALIGNANT NEOPLASM METASTATIC TO LUNG, UNSPECIFIED LATERALITY (HCC): ICD-10-CM

## 2021-08-03 DIAGNOSIS — Z79.01 CHRONIC ANTICOAGULATION: ICD-10-CM

## 2021-08-03 DIAGNOSIS — Z79.899 HIGH RISK MEDICATION USE: ICD-10-CM

## 2021-08-03 LAB
ALBUMIN SERPL-MCNC: 3.8 G/DL (ref 3.5–5.2)
ALBUMIN/GLOB SERPL: 1.2 G/DL (ref 1.1–2.4)
ALP SERPL-CCNC: 99 U/L (ref 38–116)
ALT SERPL W P-5'-P-CCNC: 51 U/L (ref 0–33)
ANION GAP SERPL CALCULATED.3IONS-SCNC: 9.2 MMOL/L (ref 5–15)
AST SERPL-CCNC: 30 U/L (ref 0–32)
BASOPHILS # BLD AUTO: 0.02 10*3/MM3 (ref 0–0.2)
BASOPHILS NFR BLD AUTO: 0.4 % (ref 0–1.5)
BILIRUB SERPL-MCNC: 0.2 MG/DL (ref 0.2–1.2)
BILIRUB UR QL STRIP: NEGATIVE
BUN SERPL-MCNC: 11 MG/DL (ref 6–20)
BUN/CREAT SERPL: 13.8 (ref 7.3–30)
CALCIUM SPEC-SCNC: 9.3 MG/DL (ref 8.5–10.2)
CHLORIDE SERPL-SCNC: 102 MMOL/L (ref 98–107)
CLARITY UR: CLEAR
CO2 SERPL-SCNC: 23.8 MMOL/L (ref 22–29)
COLOR UR: YELLOW
CREAT SERPL-MCNC: 0.8 MG/DL (ref 0.6–1.1)
DEPRECATED RDW RBC AUTO: 54.7 FL (ref 37–54)
EOSINOPHIL # BLD AUTO: 0.18 10*3/MM3 (ref 0–0.4)
EOSINOPHIL NFR BLD AUTO: 3.6 % (ref 0.3–6.2)
ERYTHROCYTE [DISTWIDTH] IN BLOOD BY AUTOMATED COUNT: 14.3 % (ref 12.3–15.4)
GFR SERPL CREATININE-BSD FRML MDRD: 79 ML/MIN/1.73
GLOBULIN UR ELPH-MCNC: 3.2 GM/DL (ref 1.8–3.5)
GLUCOSE SERPL-MCNC: 91 MG/DL (ref 74–124)
GLUCOSE UR STRIP-MCNC: NEGATIVE MG/DL
HCT VFR BLD AUTO: 47.9 % (ref 34–46.6)
HGB BLD-MCNC: 15.8 G/DL (ref 12–15.9)
HGB UR QL STRIP.AUTO: ABNORMAL
IMM GRANULOCYTES # BLD AUTO: 0.01 10*3/MM3 (ref 0–0.05)
IMM GRANULOCYTES NFR BLD AUTO: 0.2 % (ref 0–0.5)
KETONES UR QL STRIP: NEGATIVE
LEUKOCYTE ESTERASE UR QL STRIP.AUTO: ABNORMAL
LYMPHOCYTES # BLD AUTO: 1.26 10*3/MM3 (ref 0.7–3.1)
LYMPHOCYTES NFR BLD AUTO: 25.3 % (ref 19.6–45.3)
MCH RBC QN AUTO: 34.5 PG (ref 26.6–33)
MCHC RBC AUTO-ENTMCNC: 33 G/DL (ref 31.5–35.7)
MCV RBC AUTO: 104.6 FL (ref 79–97)
MONOCYTES # BLD AUTO: 0.51 10*3/MM3 (ref 0.1–0.9)
MONOCYTES NFR BLD AUTO: 10.2 % (ref 5–12)
NEUTROPHILS NFR BLD AUTO: 3.01 10*3/MM3 (ref 1.7–7)
NEUTROPHILS NFR BLD AUTO: 60.3 % (ref 42.7–76)
NITRITE UR QL STRIP: NEGATIVE
NRBC BLD AUTO-RTO: 0 /100 WBC (ref 0–0.2)
PH UR STRIP.AUTO: 5.5 [PH] (ref 4.5–8)
PLATELET # BLD AUTO: 176 10*3/MM3 (ref 140–450)
PMV BLD AUTO: 9.4 FL (ref 6–12)
POTASSIUM SERPL-SCNC: 4.6 MMOL/L (ref 3.5–4.7)
PROT SERPL-MCNC: 7 G/DL (ref 6.3–8)
PROT UR QL STRIP: ABNORMAL
RBC # BLD AUTO: 4.58 10*6/MM3 (ref 3.77–5.28)
SODIUM SERPL-SCNC: 135 MMOL/L (ref 134–145)
SP GR UR STRIP: >=1.03 (ref 1–1.03)
UROBILINOGEN UR QL STRIP: ABNORMAL
WBC # BLD AUTO: 4.99 10*3/MM3 (ref 3.4–10.8)

## 2021-08-03 PROCEDURE — 96367 TX/PROPH/DG ADDL SEQ IV INF: CPT

## 2021-08-03 PROCEDURE — 25010000002 PALONOSETRON PER 25 MCG: Performed by: NURSE PRACTITIONER

## 2021-08-03 PROCEDURE — 25010000002 DEXAMETHASONE SODIUM PHOSPHATE 100 MG/10ML SOLUTION: Performed by: NURSE PRACTITIONER

## 2021-08-03 PROCEDURE — 25010000002 FLUOROURACIL PER 500 MG: Performed by: NURSE PRACTITIONER

## 2021-08-03 PROCEDURE — 99214 OFFICE O/P EST MOD 30 MIN: CPT | Performed by: NURSE PRACTITIONER

## 2021-08-03 PROCEDURE — 96413 CHEMO IV INFUSION 1 HR: CPT

## 2021-08-03 PROCEDURE — 85025 COMPLETE CBC W/AUTO DIFF WBC: CPT

## 2021-08-03 PROCEDURE — 25010000002 BEVACIZUMAB PER 10 MG: Performed by: NURSE PRACTITIONER

## 2021-08-03 PROCEDURE — 25010000002 FOSAPREPITANT PER 1 MG: Performed by: NURSE PRACTITIONER

## 2021-08-03 PROCEDURE — 81003 URINALYSIS AUTO W/O SCOPE: CPT

## 2021-08-03 PROCEDURE — 80053 COMPREHEN METABOLIC PANEL: CPT

## 2021-08-03 PROCEDURE — 25010000002 LEUCOVORIN CALCIUM PER 50 MG: Performed by: NURSE PRACTITIONER

## 2021-08-03 PROCEDURE — 96375 TX/PRO/DX INJ NEW DRUG ADDON: CPT

## 2021-08-03 PROCEDURE — 96416 CHEMO PROLONG INFUSE W/PUMP: CPT

## 2021-08-03 PROCEDURE — 25010000002 BEVACIZUMAB 100 MG/4ML SOLUTION 4 ML VIAL: Performed by: NURSE PRACTITIONER

## 2021-08-03 RX ORDER — PALONOSETRON 0.05 MG/ML
0.25 INJECTION, SOLUTION INTRAVENOUS ONCE
Status: COMPLETED | OUTPATIENT
Start: 2021-08-03 | End: 2021-08-03

## 2021-08-03 RX ORDER — SODIUM CHLORIDE 9 MG/ML
250 INJECTION, SOLUTION INTRAVENOUS ONCE
Status: COMPLETED | OUTPATIENT
Start: 2021-08-03 | End: 2021-08-03

## 2021-08-03 RX ORDER — PALONOSETRON 0.05 MG/ML
0.25 INJECTION, SOLUTION INTRAVENOUS ONCE
Status: CANCELLED | OUTPATIENT
Start: 2021-08-03

## 2021-08-03 RX ORDER — SODIUM CHLORIDE 9 MG/ML
250 INJECTION, SOLUTION INTRAVENOUS ONCE
Status: CANCELLED | OUTPATIENT
Start: 2021-08-03

## 2021-08-03 RX ADMIN — DEXAMETHASONE SODIUM PHOSPHATE 12 MG: 10 INJECTION, SOLUTION INTRAMUSCULAR; INTRAVENOUS at 13:27

## 2021-08-03 RX ADMIN — SODIUM CHLORIDE 250 ML: 9 INJECTION, SOLUTION INTRAVENOUS at 12:53

## 2021-08-03 RX ADMIN — FLUOROURACIL 4850 MG: 50 INJECTION, SOLUTION INTRAVENOUS at 14:59

## 2021-08-03 RX ADMIN — BEVACIZUMAB 900 MG: 400 INJECTION, SOLUTION INTRAVENOUS at 13:44

## 2021-08-03 RX ADMIN — SODIUM CHLORIDE 810 MG: 900 INJECTION, SOLUTION INTRAVENOUS at 14:22

## 2021-08-03 RX ADMIN — SODIUM CHLORIDE 100 ML: 9 INJECTION, SOLUTION INTRAVENOUS at 12:55

## 2021-08-03 RX ADMIN — PALONOSETRON 0.25 MG: 0.05 INJECTION, SOLUTION INTRAVENOUS at 12:54

## 2021-08-03 NOTE — TELEPHONE ENCOUNTER
I called and left message about ecg and echo - ecg normal - needs echo in 3 months on same day as visit with me.     rm

## 2021-08-04 NOTE — PROGRESS NOTES
REASON FOR FOLLOW UP:     1. Rectal cancer, rectal ultrasound examination in July 2019 reported T3N0 disease without lymphadenopathy. She does have small pulmonary nodule 6-7 mm and 2 mm with indeterminate feature. There is no solid evidence of metastatic disease otherwise. Patient has stage IIA (T3N0M0) disease.  2. The patient is heterozygous factor V Leiden, was on prophylactic anticoagulation with Lovenox 40 mg daily given her increased risk of thrombosis with MediPort and GI malignancy.   3. PET scan on 8/8/2019 reported an 8 mm hypermetabolic right upper lobe pulmonary nodule, which is suspicious for metastatic as well.    4. Patient had a PowerPort placement on the left upper chest by Dr. Joseph on 8/9/2019.  5. Patient was started on concurrent infusional 5-FU chemoradiation therapy on 8/12/2019, with planned complete surgical resection and further adjuvant chemotherapy with intention to cure the disease.   6. Patient underwent surgical resection of the primary rectal cancer by Dr. Ye on 12/2/2019.  Pathology evaluation reported residual T3N1 disease stage IIIb.  7. Diarrhea related to therapy and radiation.   8. Patient was started cycle 1 day 1 of adjuvant FOLFOX 6 on 1/23/2020.  9. On 2/5/2020 FOLFOX 6 cycle 2 delayed secondary to neutropenia.  Patient was given 3 days of Granix injection.  After cycle #2 we planned 3 days of Granix with each cycle.   10. Patient also intolerant of oral iron.  Patient received 2 doses of IV injectafer on 02/05/2020 and 02/12/2020.   11. 02/12/2020 Proceed with cycle #2 of FOLFOX 6 with 3 days of Granix.  12. On 3/11/2020 cycle 4 postponed secondary to abdominal pain and occasional low-grade fevers.  CT scans ordered.  13. Cycle #4 resumed after CT scan revealed no evidence of disease.  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 Ye. patient has no fever.  Continue to observe.   14. Grade 3  hand-foot syndrome secondary to 5-FU after cycle #7 FOLFOX 6 chemotherapy, prompting ER visit.  Also has worsening peripheral neuropathy.   15. Cycle #8 FOLFOX 6 was given on 5/13/2020, with 50% dose reduction for both 5-FU and oxaliplatin, and also examination of bolus 5-FU.   16. PET scan examination on 5/21/2020 reported no evidence of metastatic disease in the chest abdomen pelvis.  Stable 6 mm RUL pulmonary nodule.  17. On 5/27/2020, I discussed with the patient that we can discontinue chemotherapy at this time.   18. Patient had a surgical procedure for low anterior colon resection, coloanal anastomosis on 8/3/2020.  19. CT scan for chest abdomen pelvis on 9/9/2020 reported a new 8 mm noncalcified pulmonary nodule in the left lower lobe of the lung.  Otherwise stable right upper lobe 6 mm pulmonary nodule, and no evidence of disease recurrence in the abdomen or pelvis.  20. PET scan examination on 9/18/2020 reported multiple hypermetabolic small pulmonary nodules/ new pulmonary nodules.   21. Patient was started on pelvic chemotherapy with FOLFIRI regimen on 10/13/2020.   22. 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 It reported tumor mutation burden 5 Muts/Mb, microsatellite stable, TP53 mutation R282W, and others.   23. Cycle #5 was without irinotecan, due to peripheral neuropathy.  24. 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  Patient was switched to weight-based Lovenox injection.  25. Cycle #6 5-FU and irinotecan was delayed by 2 weeks because of the above incident.  26. Patient had a telemedicine evaluation at that the [facility] [place] in February 2021.  They agreed with our treatment plan for palliative chemotherapy followed by maintenance chemotherapy.    27. PET scan examination on 4/6/2021 after cycle #12 palliative chemotherapy reported no  evidence of hypermetabolic metastatic lesion.   28. Patient was started on maintenance chemotherapy with 5-FU and Avastin on 4/13/2021. (Unable to tolerate Xeloda because of a significant hand-foot syndrome).       HISTORY OF PRESENT ILLNESS:  The patient is a 41 y.o. female the above-mentioned history who is here today for lab review and evaluation prior to her maintenance chemotherapy with 5-FU, leucovorin, Avastin.      She continues to feel well overall.  She did have a follow-up colonoscopy with Dr. Esparza since her last office visit and aside from the recovery from this procedure has been well.  She denies any concerns from the colonoscopy.  She also saw cardio oncology with hao to repeat her echocardiogram in 3 months, around the time she is due for repeat scans with an idea of how her treatment will continue at that time    She does continue with the hand-foot symptoms such as hyperpigmentation.  She continues to use utter balm with urea.  She has no blistering or peeling currently.    She continues bruising from her Lovenox injections.  She denies any bleeding however.  She denies any recent fevers.      Past Medical History:   Diagnosis Date   • Abdominopelvic abscess (CMS/HCC) 08/12/2020    ADMITTED TO [facility]   • Abnormal Pap smear of cervix 02/02/1998    JULIUS I   • Anemia in pregnancy    • Anxiety    • Bilateral epiphora 04/2020   • Bilateral hand swelling 05/02/2020    SEEN AT [facility] ER   • Cervical lymphadenitis 06/06/2012    SEEN AT [facility] ER   • Chemotherapy induced diarrhea 01/2021   • Chemotherapy induced neutropenia (CMS/HCC) 04/2020   • Chemotherapy-induced nausea 02/2020   • Chemotherapy-induced thrombocytopenia 05/2020   • Chronic diarrhea    • Colon polyps     FOLLOWED BY DR. GERONIMO ESPARZA   • Cystitis 04/24/2020    WITH DEHYDRATION, ADMITTED TO [facility]   • Cystitis 10/27/2020   • Depression    • Drug-induced peripheral neuropathy (CMS/HCC) 05/2020   • Factor V Leiden mutation (CMS/HCC)    • Fistula of  intestine    • GERD (gastroesophageal reflux disease)    • Hand foot syndrome 2020   • Heart murmur     IN CHILDHOOD   • Hematochezia    • Hemorrhoids    • Heterozygous factor V Leiden mutation (CMS/[facility])     DX 2019   • History of anemia    • History of chemotherapy     FOLLOWED BY DR. ALEXANDRU HUNT   • History of gestational diabetes    • History of pre-eclampsia    • History of pre-eclampsia    • History of radiation therapy     FOLLOWED BY DR. JAVON LEWIS   • History of TB skin testing 2009    TB Skin Test   • HPV in female    • Hypokalemia 2019   • Hypomagnesemia 2019   • Hyponatremia 2021   • IBS (irritable bowel syndrome)    • Ileostomy in place (CMS/[facility])     FOLLOWED BY DR. GERONIMO ESPARZA   • Infected insect bite of neck 2016    SEEN AT [facility]   • Kidney stones 2007    SEEN AT [facility] ER   • Lump of right breast     SWOLLEN LYMPH NODE   • Lung cancer (CMS/[facility]) 2020    METASTATIC LUNG CANCER   • Macrocytosis 2021   • Monopolar depression (CMS/[facility])    • On anticoagulant therapy    • Palmar-plantar erythrodysesthesia 2021   • Perirectal abscess 2020   • Pulmonary embolism without acute cor pulmonale (CMS/[facility]) 09/20/2019    X 3; ADMITTED TO [facility]   • Pulmonary nodule, right 2020   • Rectal cancer (CMS/[facility]) 2019    STAGE IIA, INVASIVE MODERATELY DIFFERENTIATED ADENOCARCINOMA, CHEMO AND XRT FINISHED 2019   • Right shoulder pain 2020    SEEN AT [facility] ER   • Right ureteral stone 10/01/2019    SEEN AT [facility] ER   • SAB (spontaneous ) 1996     A2-1 INDUCED   • Sciatica    • Sepsis due to cellulitis (CMS/[facility]) 2002    LEFT GREAT TOE, ADMITTED TO [facility]   • Tachycardia 2020   • 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 JAMEY TALAVERA AT [facility]   • COLON RESECTION N/A 12/2/2019    Procedure: laparoscopic low anterior colon resection with mobilization of splenic flexure and diverting loop ileostomy: ERAS;  Surgeon: Padmaja Esparza MD;  Location: [facility] OR;  Service: General   • COLON RESECTION N/A 8/3/2020    Procedure: LOW ANTERIOR COLON RESECTION, COLOANAL ANASTOMOSIS, MOBILIZATION SPLENIC FLEXURE;  Surgeon: Padmaja Esparza MD;  Location: [facility] MAIN OR;  Service: General;  Laterality: N/A;   • COLONOSCOPY N/A 7/15/2020    PATENT ANASTAMOSIS IN MID RECTUM, RESCOPE IN 1 YR, DR. PADMAJA ESPARZA AT [facility]   • COLONOSCOPY W/ POLYPECTOMY N/A 07/08/2019    15 MM TUBULOVILLOUS ADENOMA POLYP IN HEPATIC FLEXURE, 20 MMTUBULOVILLOUS ADENOMA WITH HIGH GRADE DYSPLASIA IN RECTOSIGMOID, 4 CM MASS IN MID RECTUM, PATH: INVASIVE MODERATELY DIFFERENTIATED ADENOCARCINOMA, DR. JENNIFER LI AT [facility]   • DILATATION AND EVACUATION N/A 2009   • ENDOSCOPY N/A 07/08/2019    LA GRADE A ESOPHAGITIS, GASTRITIS, ALL BIOPSIES BENIGN, DR. JENNIFER LI AT [facility]   • INCISION AND DRAINAGE PERIRECTAL ABSCESS N/A 8/14/2020    Procedure: INCISION AND DRAINAGE OF retrorectal dehiscence abcess with drain placement and irrigation;  Surgeon: Padmaja Esparza MD;  Location: [facility] OR;  Service: General;  Laterality: N/A;   • PAP SMEAR N/A 01/24/2014   • SIGMOIDOSCOPY N/A 7/24/2019    INFILTRATIVE PARTIALLY OBSTRUCTING LARGE RECTAL CANCER, AREA TATOOED, DR. PADMAJA ESPARZA AT [facility]   • SIGMOIDOSCOPY N/A 11/23/2019    AN ULCERATED PARTIALLY OBSTRUCTING MASS IN MID RECTUM, AREA TATTOOED, DR. PADMAJA ESPARZA AT [facility]   • SIGMOIDOSCOPY N/A 7/22/2021    PATENT ANASTAMOSIS IN DISTAL RECTUM, RESCOPE IN 1 YR, DR. PADMAJA ESPARZA AT [facility]   • TRANSRECTAL ULTRASOUND N/A 7/24/2019    Procedure: ULTRASOUND TRANSRECTAL;  Surgeon: Padmaja Esparza MD;  Location: [facility] ENDOSCOPY;  Service: General   • URETEROSCOPY LASER LITHOTRIPSY  WITH STENT INSERTION Right 10/30/2019    DR. ESTUARDO BEASLEY AT [place]   • VAGINAL DELIVERY N/A 09/18/1998   • VENOUS ACCESS DEVICE (PORT) INSERTION Left 8/9/2019    Procedure: INSERTION VENOUS ACCESS DEVICE;  Surgeon: Sj Joseph MD;  Location: [facility];  Service: General   • VENOUS ACCESS DEVICE (PORT) INSERTION N/A 12/18/2020    Procedure: INSERTION VENOUS ACCESS DEVICE right side, removal venous access device left side;  Surgeon: Sj Joseph MD;  Location: [facility] OR [facility] [place] [facility];  Service: General;  Laterality: N/A;   • WISDOM TOOTH EXTRACTION Bilateral 1993       HEMATOLOGIC/ONCOLOGIC HISTORY:      The patient reports she has intermittent rectal bleeding and urgency, mucous with her stool, starting sometime in 2016. At that time she was referred to GI service, and was diagnosed of irritable bowel syndrome. Nevertheless she had worsening urgency for bowel movement, and had a couple of incidents losing control of stool. She was recently seen by Roland Thorpe MD again and had colonoscopy and EGD exam on 07/08/2019. She was found having a circumferential rectal mass. According to the procedure note, the patient had a fungating circumferential bleeding 4 cm mass in the middle rectum, at distance between 13 cm and 17 cm from the anus. Mass was causing partial obstruction. There were also colon polyps found at the hepatic flexure and also at the rectosigmoid junction 23 cm from the anus. Both were resected and retrieved. EGD examination reported grade A esophagitis at the GE junction and patchy discontinuous erythema and aggravation of the mucosa without bleeding in the stomach body. There is normal mucosa of the duodenum. Pathology evaluation from this procedure reported moderately differentiated adenocarcinoma involving the rectal mass. The rectal sigmoid junction polyp was tubular/tubulovillous adenoma with high grade dysplasia negative for invasive malignancy. The hepatic flexure polyp was also  tubular/tubulovillous adenoma negative for high grade dysplasia or malignancy. The biopsy from the esophagus reports squamocolumnar mucosa with inflammatory changes suggestive of mild reflux esophagitis, negative for interstitial metaplasia. Gastric biopsy was benign and duodenal biopsy was also benign. There is no mention of H-pylori.     Patient was subsequently referred to colorectal surgeon Padmaja Ye MD for further evaluation. The patient had CT scan examination for chest, abdomen and pelvis requested by Dr. Ye and were done on 07/13/2019. The study reported no evidence of lymphadenopathy in the abdomen and pelvis. There was wall thickening of the rectosigmoid junction. Normal spleen, pancreas, adrenal glands and kidneys. There was fatty liver infiltration but no focal lymphatic lesions. There was a small 6-7 mm noncalcified nodule in the right upper lobe and a tiny 3 mm subpleural nodule in the right middle lobe. No mediastinal or hilar lymphadenopathy.     Dr. Ye performed staging rectal ultrasound examination. This study reported tumor penetrating through the muscularis propria as T3 disease; there were no lymph nodes identified.    She had a hospitalization in late September 2019 because of newly discovered pulmonary emboli.  She was on prophylactic Lovenox prior to the incident of PE.  Now she is on full dose Xarelto anticoagulation.  Patient reports no further chest pain dyspnea.  She denies bleeding or bruising.  During her hospitalization, she was seen by Dr. Ye, who plans to have surgery 8 to 12 weeks after finishing radiation therapy.  She finished her radiation on 9/19/2019.     I noticed patient also presented to the emergency room on 10/1/2019 complaining of right flank area pain.  I reviewed the images studies and indeed she has a very small 1 to 2 mm obstructing kidney stone in the UV junction.  Patient is still symptomatic with some pains and dysuria.  She denies fever sweating or  chills.    Laboratory study on 10/7/2019 reported normal CBC including hemoglobin 13.1, platelets 301,000, WBC 6170 and ANC 4900 lymphocytes 590 monocytes 480.      The nurse reported malfunction of port-a-catheter on 10/7/2019.  Port study on 10/8/2019 showed fibrin sheath around the distal aspect of the Mediport catheter in the SVC. This does not appear to hinder injection, but does prevent aspiration at this time.    Patient underwent surgical resection of the colon on 12/2/2019 with Dr. Ye.  Pathology evaluation reported residual T3 disease, also 1 out of 14 lymph nodes positive for malignancy.  So this patient in either had at least stage IIIb disease (T3 N1 M0?).      Adjuvant chemotherapy FOLFOX 6 will be started on 1/22/2020.  Laboratory study reported iron saturation 10%, free iron 31 TIBC 319 and ferritin 168.  Her hemoglobin was 11.8, WBC 5600, and platelets 347,000.    Patient was here on 02/12/2020 for cycle 2 of her FOLFOX.  This is delayed x1 week secondary to neutropenia.  The patient ultimately received 3 days worth of Neupogen with recovery of her blood counts.  Of note, the patient struggled with significant nausea without any episodes of vomiting following cycle #1 of chemotherapy resulting in significant weight loss.  She is up 12 pounds over the last week as her appetite has normalized.  We will add Emend to her care plan.    She is having several loose stools per her colostomy and has been hesitant to take Imodium due to prior history of constipation.  I encouraged her to try this with a maximum of 8 tablets/day.  She denies any infectious symptoms including fevers or chills.  No excess bleeding or bruising noted.  She had the expected cold sensitivity related to the Oxaliplatin for about 3 days following treatment.    Labs from 02/12/2020 demonstrated total white blood cell count of 5.14, ANC of 3.06, hemoglobin of 11.2, platelets of 211,000.  She was found to be iron deficient last week  and is intolerant to oral iron secondary to GI distress.  For this reason, she initiated IV iron therapy with Injectafer last week.  She had received her second dose 02/12/2020    Patient has normalized hemoglobin 12.2 on 2/26/2020.    She reported on 5/5/2020 she had a recent visit to the emergency department for what was suspected to be an allergic reaction.  She called our on-call service on Saturday with reports of hand and face swelling.  She was instructed to proceed to the emergency department at which time she was assessed and prescribed a Medrol Dosepak.  She had just completed her 5-FU and was unhooked on Friday, 5/3/2020.  Her symptoms have improved.  She does report persistent hyperpigmentation and mild swelling of the palms of the hands but this is much improved.  It was her right hand specifically that was swollen.  Her facial swelling has resolved.  She continues on Cefdinir nd since has 1 day remaining, she was prescribed cefdinir for a UTI requiring hospitalization at the end of April.  Reports no new symptoms.  Her labs are stable.  She is scheduled for treatment again.    Patient states at this time she is not tolerating her chemotherapy well.     Patient seen in the emergency department on 5/2/2020 for what was suspected to be an allergic reaction.  She called our on-call service on Saturday explaining that she was experiencing hand and face swelling.  She was instructed to proceed to the emergency department at which time she was assessed and prescribed a Medrol Dosepak.  She had just completed her 5-FU and was unhooked on Friday, 5/1/2020.      She reports since her ED visit on 5/2/2020 her symptoms have not improved. Her hands and feet were swollen upon presenting to the ED and she could barely make a fist. She states that she feels so swollen she is not able to stand it. She states that her skin on the hands and feet are peeling extensively as well, besides changing color to more dark.      She also reports significant fatigue after her first week of the 5-FU treatment but she expected this side effect. She also notices significant increase in her neuropathy to the point that she is not able to even walk around in her home without her house slippers due to irritation from her rugs. She denies and associated nausea or vomiting at this time. She also has episodes of epistaxis every day after the chemotherapy cycle #7.  She does report working full-time during the week of chemotherapy.    Laboratory studies, 5/13/2020, show moderate thrombocytopenia platelets 123,000, low normal WBC 4140 including ANC 2720 and normal hemoglobin 13.4.  Because significant hand-foot syndrome, will decrease both 5-FU and oxaliplatin by 50%, and eliminate bolus dose of 5-FU.    On 5/21/2020 patient had a PET scan performed which showed no convincing evidence of residual disease in abdomen or pelvis, no metastatic disease within the chest or neck.     Cycle #8 FOLFOX 6 was given on 5/13/2020, with 50% dose reduction for both 5-FU and oxaliplatin, and also examination of bolus 5-FU.  She states on 5/27/2020 that with the recent reduction of the chemotherapy she feels significantly better. She has more energy and is able to do her daily routine.      PET scan examination on 5/21/2020 reported no evidence of metastatic disease in chest abdomen pelvis.  There was a stable 6 mm right upper lobe pulmonary nodule.    Laboratory studies on 5/27/2020 showed mild leukocytopenia WBC 3720 but a normal ANC 2250 and lymphocytes 630.  Normal platelets 163,000 and hemoglobin 12.6.  Chemistry lab reported normal renal function, liver function panel and a electrolytes, elevated glucose 164.    Laboratory studies 6/24/2020, showed normal hemoglobin 13.4 but macrocytosis .9.  Normal platelets 210,000 and WBC 5870.  She had normal CMP.     Patient last time was here in late June 2020.  Since that time she had surgical procedure for low  anterior colon resection, coloanal anastomosis on 8/3/2020.  She later developed a perirectal abscess, required surgical drainage on 8/14/2020.  She was discharged on 8/27/2020.    Patient reports to me she still has lower abdominal wall vacuum suction in place.  She denies fever sweating chills.  Performance status is ECOG 1.  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  She does have visiting nurse come to the home for wound care.    CT scan for chest abdomen pelvis on 9/9/2020 reported a new 8 mm noncalcified pulmonary nodule in the left lower lobe.  Otherwise stable right upper lobe 6 mm pulmonary nodule, and no evidence of disease recurrence in the abdomen or pelvis.     Laboratory study on 9/16/2020 reported elevated AST 55, ALT 95, alk phosphatase 143 but normal total bilirubin 0.3.  Chemistry lab otherwise unremarkable.  She has completed normal CBC.      Because of the abnormal CT scan examination for chest abdomen pelvis on 9/9/2020 reported a new 8 mm noncalcified pulmonary nodule in the left lower lobe, we obtained a PET scan examination for further evaluation, which was done on 9/18/2020.  This study reported several pulmonary nodules, some of them are hypermetabolic, newly developed compared to previous PET scan in May 2020.  Certainly does highly suspicious for metastatic disease.  There are also hypermetabolic activity in the abdomen and pelvic area which is hard to differentiate from surgical scar versus metastatic malignancy.    Laboratory study on 10/13/2020 reported normal CBC with Hb 13.9, platelets 302,000 and WBC 5520 including ANC 3830.  Chemistry lab reported normalized AST 20, alk phosphatase 116 improved ALT 35, and maintains normal bilirubin, with normal electrolytes and liver function panel.     Patient was started on first cycle of palliative chemotherapy FOLFIRI on 10/13/2020.    She recently had hospitalization from 12/21/2020 through 12/24/2020 with a new  thrombus of the superior vena cava which developed after a new PowerPort catheter placement while the patient was on Xarelto.  Patient had a new port placed 12/18/2020, and had held her Xarelto for 2 days prior to surgery.  She presented to the ER on 12/21/20 with complaints of facial and arm swelling for 3 days.  She also noted increased shortness of breath.  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  Thrombus within the right internal jugular vein cannot be excluded.  Patient was started on IV heparin, and eventually transitioned to weight-based Lovenox, which she now continues.      MEDICATIONS    Current Outpatient Medications:   •  clonazePAM (KlonoPIN) 1 MG tablet, TAKE 1 TABLET BY MOUTH THREE TIMES A DAY AS NEEDED FOR ANXIETY OR SEIZURES, Disp: 90 tablet, Rfl: 0  •  Cyanocobalamin (VITAMIN B-12 PO), Take  by mouth., Disp: , Rfl:   •  dicyclomine (BENTYL) 20 MG tablet, TAKE 1 TABLET BY MOUTH EVERY 6 HOURS AS NEEDED, Disp: 360 tablet, Rfl: 0  •  diphenoxylate-atropine (LOMOTIL) 2.5-0.025 MG per tablet, Take 1 tablet by mouth 4 (Four) Times a Day As Needed for Diarrhea., Disp: 120 tablet, Rfl: 1  •  enoxaparin (LOVENOX) 100 MG/ML solution syringe, Inject 0.9 mL under the skin into the appropriate area as directed Every 12 (Twelve) Hours., Disp: 60 mL, Rfl: 2  •  escitalopram (LEXAPRO) 10 MG tablet, TAKE 1 TABLET BY MOUTH EVERY DAY, Disp: 30 tablet, Rfl: 5  •  famotidine (PEPCID) 20 MG tablet, TAKE 1 TABLET BY MOUTH TWICE A DAY, Disp: 180 tablet, Rfl: 1  •  fluorouracil in dextrose 5 % 250 mL infusion, Infuse  into a venous catheter 1 (One) Time. Takes twice a month., Disp: , Rfl:   •  Loratadine (CLARITIN) 10 MG capsule, Take 10 mg by mouth Every Evening., Disp: , Rfl:   •  mineral oil-hydrophilic petrolatum (AQUAPHOR) ointment, Apply 1 application topically to the appropriate area as directed As Needed for Dry Skin., Disp: , Rfl:   •  nystatin (MYCOSTATIN) 198040 UNIT/GM cream,  Apply  topically to the appropriate area as directed 2 (Two) Times a Day As Needed (rash)., Disp: 30 g, Rfl: 2  •  ondansetron (ZOFRAN) 8 MG tablet, Take 1 tablet by mouth 3 (Three) Times a Day As Needed for Nausea or Vomiting., Disp: 60 tablet, Rfl: 5  •  prochlorperazine (COMPAZINE) 10 MG tablet, Take 1 tablet by mouth Every 6 (Six) Hours As Needed for Nausea or Vomiting., Disp: 30 tablet, Rfl: 2  •  promethazine (PHENERGAN) 25 MG tablet, Take 1 tablet by mouth Every 6 (Six) Hours As Needed for Nausea or Vomiting., Disp: 60 tablet, Rfl: 2  No current facility-administered medications for this visit.    ALLERGIES:   No Known Allergies    SOCIAL HISTORY:       Social History     Tobacco Use   • Smoking status: Current Every Day Smoker     Packs/day: 1.00     Years: 24.00     Pack years: 24.00     Types: Electronic Cigarette, Cigarettes   • Smokeless tobacco: Never Used   Vaping Use   • Vaping Use: Some days   • Substances: Nicotine   • Devices: Disposable   Substance Use Topics   • Alcohol use: Not Currently     Comment: Caffeine use rare   • Drug use: No         FAMILY HISTORY:   Mother has positive factor V Leiden mutation, although she did not have thrombosis, mother also is coronary disease with stenting, she also is occasional bruising.  Maternal grandmother had DVT, she had multiple surgical procedures.  Patient mother's half-brother had metastatic colon cancer at diagnosis in his 50s.  Maternal great aunt has breast cancer.  Patient will follow his skin cancer in his 60s, exclusive.    Review of Systems   Constitutional: Negative for activity change, appetite change, chills, diaphoresis, fatigue, fever and unexpected weight change.        She works full-time in a dental clinic.   HENT: Negative for mouth sores and sore throat.    Eyes: Negative for visual disturbance.   Respiratory: Negative for cough and shortness of breath.    Cardiovascular: Negative for chest pain and leg swelling.   Gastrointestinal:  "Negative for abdominal distention, abdominal pain, blood in stool and nausea.   Endocrine: Negative for cold intolerance.   Genitourinary: Negative for dysuria and hematuria.   Musculoskeletal: Negative for arthralgias and joint swelling.   Skin: Positive for color change (Hands and feet bilaterally). Negative for rash (This has resolved).        See HPI   Allergic/Immunologic: Positive for immunocompromised state (Chemotherapy).   Neurological: Negative for dizziness and light-headedness.   Hematological: Negative for adenopathy. Bruises/bleeds easily (Easy bruising at the site of injection of Lovenox ).   Psychiatric/Behavioral: Negative for agitation and confusion.       08/03/2021 ROS unchanged from above except as updated.       Vitals:    08/03/21 1206   BP: 134/94   Pulse: 114   Resp: 16   Temp: 97.3 °F (36.3 °C)   TempSrc: Temporal   SpO2: 97%   Weight: 85.8 kg (189 lb 3.2 oz)   Height: 166 cm (65.35\")   PainSc: 0-No pain     Current Status 8/3/2021   ECOG score 0     Physical Exam  Vitals reviewed.   Constitutional:       General: She is not in acute distress.     Appearance: Normal appearance. She is well-developed.   HENT:      Head: Normocephalic and atraumatic.   Eyes:      Pupils: Pupils are equal, round, and reactive to light.   Cardiovascular:      Rate and Rhythm: Normal rate and regular rhythm.      Heart sounds: Normal heart sounds. No murmur heard.     Pulmonary:      Effort: Pulmonary effort is normal. No respiratory distress.      Breath sounds: Normal breath sounds. No wheezing, rhonchi or rales.   Abdominal:      General: Bowel sounds are normal. There is no distension.      Palpations: Abdomen is soft.      Comments: Ostomy in right abdomen.  Scattered bruises on the abdomen bilaterally from Lovenox injections.   Musculoskeletal:         General: Normal range of motion.      Cervical back: Normal range of motion.   Skin:     General: Skin is warm and dry.      Findings: Bruising present. "   Neurological:      Mental Status: She is alert and oriented to person, place, and time.     08/03/2021 physical exam is unchanged from above except as updated.    RECENT LABS:  Results from last 7 days   Lab Units 08/03/21  1130   WBC 10*3/mm3 4.99   NEUTROS ABS 10*3/mm3 3.01   HEMOGLOBIN g/dL 15.8   HEMATOCRIT % 47.9*   PLATELETS 10*3/mm3 176       Results from last 7 days   Lab Units 08/03/21  1130   SODIUM mmol/L 135   POTASSIUM mmol/L 4.6   CHLORIDE mmol/L 102   CO2 mmol/L 23.8   BUN mg/dL 11   CREATININE mg/dL 0.80   CALCIUM mg/dL 9.3   ALBUMIN g/dL 3.80   BILIRUBIN mg/dL 0.2   ALK PHOS U/L 99   ALT (SGPT) U/L 51*   AST (SGOT) U/L 30   GLUCOSE mg/dL 91           Assessment/Plan      ASSESSMENT:   1.  Rectal cancer, rectal ultrasound examination reported T3N0 disease without lymphadenopathy.   · CT scan of chest, abdomen and pelvis reported no lymphadenopathy in the abdomen, pelvis or chest. She does have small pulmonary nodule 6-7 mm and 2 mm with indeterminate feature. There is no solid evidence of metastatic disease otherwise.   · Based on the CT scan and rectal ultrasound imaging studies, this patient had stage IIA (T3N0M0) disease.   · She had PET scan examination on 8/8/2019 which reported a hypermetabolic right upper lobe pulmonary nodule 6 mm with SUV 5.6.  This is suspicious for metastatic disease however it is too small to be biopsied.  This patient may have stage IV disease.   · She initiated concurrent radiation with continuous 5-FU on 8/12/2019.  · Patient finished concurrent chemoradiation therapy.  · Patient underwent surgical resection of the rectal tumor and diverting loop ileostomy on 12/2/2019 with Dr. Ye.  Pathology evaluation reported residual T3 disease, with 1 out of 14 lymph nodes positive for malignancy.  Certainly this patient has at least stage IIIb rectal cancer (T3 N1 M0?)  · On 1/22/2020 adjuvant chemotherapy FOLFOX 6 regimen initiated.    · On 2/5/2022 cycle #2 was delayed  secondary to neutropenia.  She was given 3 days of Granix.   · Emend added with cycle 3.  With improved nausea control  · Continuing home Granix x3 days following 5-FU disconnect  · 3/11/2020 due for cycle 4, however, she is experiencing progressive abdominal pain and occasional fevers.   · CT scan performed on 3/13/2020 reveals no evidence of progressive or recurrent disease.  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  · Patient hospitalized 4/24-4/26/20 after cycle 5 of chemotherapy with acute UTI.  CT abdomen/pelvis noting fluid collection in the presacral region having diminished in size compared to CT dated 3/13/2020.  Patient was evaluated by Dr. Ye while in the hospital with further plans to evaluate possible colovaginal fistula following completion of chemotherapy.  Patient did respond to IV antibiotics and discharged home on oral cefdinir.  · 4/29/2020 cycle 6 of FOLFOX.  Urinary symptoms have resolved   · Patient seen in the [facility] ED on 5/2/2020 for what was suspected to be an allergic reaction.  She called our service on Saturday explaining that she was experiencing hand and face swelling.  She was instructed to proceed to the emergency department at which time she was assessed and prescribed a Medrol Dosepak.  She had just completed her 5-FU and was unhooked on Friday, 5/1/2020.  Her symptoms have resolved in the office on assessment, 5/5/2020.  · The patient had grade 3 hand-foot syndrome based on symptomology.  Patient had cycle #8 of 5-FU on 5/13/2020. Due to her symptoms and poor tolerance to the 5-FU, her treatment dose will be reduced 50% for oxaliplatin and infusional 5-FU.  Bolus 5-FU will be discontinued..  · On 5/13/2020  We discussed further treatment options pending the scan results.  If she has indeed residual disease or metastatic disease, we will switch her to irinotecan plus Avastin or anti-EGFR monoclonal antibody.  She will need a further molecular testing of her tumor samples  in that case.  · On 5/21/2020 patient had a PET scan and it showed no evidence of of metastatic disease in the neck, chest, abdomen or pelvis.  There was fluid accumulation/abscess.   · On 5/27/2020 I discussed with the patient that we can discontinue chemotherapy at this time.  She will follow-up with Dr. Ye to discuss a possibility to reverse the ileostomy.  We likely will obtain anther PET scan in 3 to 4 months for reassessment.    · Patient was seen by Dr. Ye on 6/19/2019 for evaluation and to discuss possible take down of her ileostomy.  She is scheduled to have a gastrografin enema on 7/2/2020 to evaluate for a fistula, and then a colonoscopy on 7/15/2020, both done by Dr. Ye.  She states that based on the above imaging and procedure findings, she may have a more extensive surgery done or just have her ileostomy reversed, which would likely be done in August 2020.  This will all be coordinated under the care of Dr. Ye.     · I reviewed scan results with the patient on 9/16/2020 for the most recent CT scan from 09/09/2020 and also compared to her previous PET scan examination from 05/21/2020 and also the original PET scan from 08/09/2019.  The original PET scan there is a small right upper lobe pulmonary nodule 6 mm with SUV 5.6. So in the 05/2020 PET scan the nodule is still there but activity seems much less and this most recent CT scan examination also documented the preexisting small pulmonary nodule however there is a new left lower lobe pulmonary nodule 8 mm in size and I shared with the patient today this nodule is suspicious for metastatic disease. Laboratory studies reported minimal CEA level 1.32 on 09/09/2020. Liver function panel was only marginally abnormal with the ALT 45, the remaining is normal. However laboratory study today showed worsening AST 55, ALT 95 and alkaline phosphatase 143 but is still normal, total bilirubin 0.3.   · So I discussed with the patient on 9/16/2020  recommended to have repeat PET scan examination for assessment because the left lower lobe pulmonary nodule is too small to be biopsied, and if the PET scan reports hypermetabolic lesion, I recommended to have stereotactic radiation therapy. Explained to patient that she is not a really good candidate to have thoracotomy for resection because she still has unhealed wound in the abdomen. I think the stereotactic radiation therapy will be a more feasible choice.  · Laboratory study on 9/16/2020 reported worsening liver function panel with both elevated AST, ALT and also slightly worsened alkaline phosphatase despite having normal total bilirubin. I recommended to repeat laboratory study for reevaluation. The only new medication she has in the past several days is Augmentin but otherwise no change of condition. She denies any recent viral infection. We will monitor this.   · PET scan examination obtained on 9/18/2020 showed metastatic disease.  It reported a few hypermetabolic pulmonary nodules, and some new small pulmonary nodules, all of them highly suspicious for metastatic disease.  She also has hypermetabolic activity in the abdomen and pelvic area which could be related to scar tissue from her recent surgery versus metastatic disease.  Nevertheless overall picture fits with metastatic cancer.  · Discussed with the patient on 9/20/2020, we reviewed the PET scan images together, and I recommended to have systematic chemotherapy, I do not think stereotactic reading therapy to the hypermetabolic lesions in the lungs warranted at this time because even those will be treated with reading therapy, she will still need systematic chemotherapy.  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  I did discuss with the patient using anti-EGFR monoclonal antibody versus anti-VEGF monoclonal antibody.     · Palliative chemotherapy cycle#1 FOLFIRI was started on 10/13/2020.  No bolus 5-FU  given considering previous poor tolerance.    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  Microsatellite stable, mutation burden 5/Mb.   · Discussed with the patient 10/27/2020, because of the [place] mutation, she is not a candidate for anti-EGFR monoclonal antibody such as Erbitux or vectibix.  She could be a candidate for anti-VEGF monoclonal antibody, however because of active wound, she is not a candidate right now at this moment.  · Patient seen in the ED for acute right neck pain on 11/16/2020.  CT angiogram as well as CT of the cervical spine performed with no acute findings but notably one of her pulmonary nodules, left upper lobe, somewhat decreased in size.  Patient given prednisone pack.  Further details below.  · Patient due today for cycle #4 FOLFIRI.  Plan repeat PET scan after cycle 6.  · Last received cycle #5 chemotherapy without irinotecan on 12/8/2020.   · Patient here 12/29/2020 for evaluation and consideration of cycle 6, but she continues to complain of swelling.  She does have swelling typically after treatment as well.  We will hold treatment for 1 week and reevaluate next week.  Still planning on PET scan following cycle 6.   · Cycle #6 chemotherapy will be resumed on 1/5/2021.  Started back on original irinotecan.  · PET scan examination obtained on 1/12/2021 after cycle #6 chemotherapy reported improved pulmonary nodule hypermetabolic activity.  Confirmed these are metastatic lesions.  · Patient is evaluated 1/19/2020, will continue cycle #7 FOLFIRI chemotherapy.  However Avastin will be on hold see next section.   · Patient was reevaluated on 2/2/2021, will continue chemotherapy cycle #8 FOLFIRI.  Avastin / biosimilar will be added.    · [facility] mid February 2021, and had recommended palliative chemotherapy without surgical intervention of metastatic lung lesions.   · 2/16/2021cycle #9 FOLFIRI plus  bevacizumab.  Tolerated last cycle well with only some mild increase in liquid stools.  This was easily controlled with antidiarrheals.  · On 3/2/2021, patient will proceed with cycle #10 FOLFIRI plus bevacizumab.  After 12 cycles, plan to start maintenance treatment.  · 3/16/2021 cycle 11.  Plus bevacizumab.  · 3/30/2021 cycle 12 FOLFIRI plus bevacizumab.  Having issues with worsening nausea despite premeds with dexamethasone, Aloxi, Emend, and Zofran at home.  Patient is requesting a dose of Phenergan, which is helped her in the past.  We will give this to her with treatment today.  · PET scan examination on 4/6/2021 reported no evidence of hypermetabolic metastatic lesion.  · Discussed with the patient on 4/13/2021, we reviewed the PET scan results.  We recommended to switch to maintenance chemotherapy without irinotecan.  We will continue 5-FU and Avastin every 2 weeks.  We discussed possibility of switching to oral Xeloda treatment.  Discussed with the patient for side effects more so with hand-foot syndrome.  Patient is agreeable.   · Xeloda 2000 mg twice daily 7 days on, 7 days off along with Avastin initiated 4/27/2021.  · After only 5 doses of Xeloda significant hand-foot syndrome, Xeloda held.  Dose adjustment versus transition to 5-FU will be further discussed with Dr. Nguyen  · Patient returns 5/11/2021 for reevaluation.  She did take her first dose of Xeloda 1500 mg this morning, but is actually requesting to be transition back to infusional 5-FU, as she felt that she tolerated infusional 5-FU without any problem.  The patient will receive 5-FU leucovorin and Avastin every 2 weeks.  Xeloda will be discontinued.  · 5/25/2021 continued cycle #4 maintenance 5-FU, leucovorin, Avastin tolerating this much better so far, we will continue this.  · On 6/8/2021, will proceed ahead with cycle #5 maintenance chemotherapy with infusional 5-FU, leucovorin and Avastin.  · 6/22/2021 continues on maintenance  chemotherapy with infusional 5-FU, leucovorin, Avastin tolerating well.  · 7/6/2021, proceed with maintenance chemotherapy cycle #7.  I discussed with the patient, recommended to have 12 cycles of maintenance chemotherapy, then obtain PET scan for reevaluation.  We could discuss further treatment plan at that time.  · 8/3/2021 continues with maintenance chemotherapy with 5-FU, leucovorin, Avastin tolerating well.  This will be her ninth cycle.  Plans to repeat a PET scan after a total of 12 cycles maintenance.    2 .  Pulmonary nodule, hypermetabolic on PET scan.   · Her original PET scan examination from 8/8/2019 reported a small 8 mm right upper apex pulmonary nodule but hypermetabolic SUV 5.1.  That was too small to be biopsied, however there was always a possibility of metastatic disease considering that high activity despite a such a small nodule.  · Her chest CT scan examination from 3/13/2020 reported this shrank to 6 mm.    · PET scan examination on 5/21/2020 reported no metabolic activity at this residual nodule.  This needs to be monitored.    · PET scan examination 9/18/2020 reported couple of hypermetabolic pulmonary nodules, besides a few extra small pulmonary nodules.  Those are highly suspicious for metastatic disease.    · PET scan examination obtained on 1/12/2021 after cycle #6 chemotherapy reported improved a pulmonary nodule hypermetabolic activity.  Confirmed these are metastatic lesions.  · 1/19/2021, patient reports she will see thoracic surgeon tomorrow at the [facility] where she seeks second opinion evaluation, and to see whether she would be a candidate for resection of pulmonary nodules.  I discussed with the patient, she had at least 2 hypermetabolic lesions although they responded to chemotherapy, I do not think she would be a candidate for surgery.   · Thoracic surgeon from [facility] recommended metastectomy and to discontinue chemotherapy  afterwards.   · Patient had a third opinion evaluation on 2/11/2021 by telemedicine with [facility] physician, who recommended palliative chemotherapy with no surgical intervention for metastatic pulmonary lesions.  Patient was agreeable with this strategy.      3.   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  Low-dose Lovenox discontinued and initiated Xarelto 20 mg daily.  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  · Patient held Xarelto for 2 days prior to MediPort removal from the left chest wall and placement of new port in the right chest wall on 12/18/2020.  She resumed Xarelto that evening.  · Progressive swelling in the neck, face, upper extremities prompting hospitalization with CT scan showing thrombosis in the right subclavian vein and SVC.  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  Although she had been off of Xarelto for a few days, clearly Xarelto was not prohibiting progression of her thrombosis after she resumed treatment and there was some evidence to suggest thrombosis had been present at least in the innominate vein on prior scan in September but now appears chronic.  Current acute thrombosis involving SVC and right subclavian.  · Patient was admitted and placed on heparin drip  · Patient was evaluated by vascular surgery and intervention was not recommended for thrombolysis/thrombectomy.  · On 12/22/2020, the patient  developed worsening shortness of breath, increasing upper extremity edema consistent with worsening SVC syndrome.  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  · Symptoms improved and patient was discharged 12/24/2020 on Lovenox 100 mg every 12 hours.    · Returns 12/29/2020 for evaluation continuing Lovenox, overall tolerating it well.  No bleeding issues.  · Improved edema 1/5/2021.  Will continue Lovenox weight-based every 12 hours.  · On 1/19/2021 and 2/2/2021, patient reports improved facial swelling.  She however does have being bruise on her abdomen wall where she does Lovenox injection.  However she does not have a spontaneous epistaxis, gum bleeding or other bleeding.   · On 2/2/2021, we discussed that side effects of Avastin/biosimilar related to thrombosis.  Since this patient already has been on Lovenox, I think the benefits from treatment for her cancer will outweigh that risk of thrombosis, will proceed ahead with Avastin.  I cautioned patient to watch out for signs of worsening thrombosis patient voiced understanding.   · On 3/16/2021, no evidence of worsening DVT, continue Lovenox.  · 5/11/2021 Lovenox dosing will be adjusted due to patient's weight loss.  We discussed she needs 1 mg/kg.  Her syringes are 100 mg syringes she will do 0.9 mL subcu every 12 hours.  · 8/3/2021 Patient continues to have bruising on abdomen from lovenox injections.  Will need to monitor weight and adjust dosing in future if further weight loss.     4.  This patient also has strong family history for malignancy the patient had appointment with genetic counseling on September 3, 2019.  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    5.  Mild anemia, improved since her surgery.    · She also has a history of iron deficiency.  Iron studies reveal a saturation of just 10%.  She was started on oral iron but was  unable to tolerate due to GI side effects.   · Status post Injectafer 2/5/2020 and 2/12/2020   · Improved hemoglobin 13.5 on 1/5/2021.  · Hemoglobin 14.7 on 2/16/2021.    · Hemoglobin 16.2 on 3/2/2021.    · 3/16/2020 when hemoglobin now up to 16.2, hematocrit 47.6.  Patient admits that she has started smoking again, and I have encouraged her to quit.  She denies any snoring or sleep apnea diagnosis.  Patient is not taking oral iron.  We will closely monitor.  · 3/30/2020 hemoglobin 15.7, HCT 47.5.  Patient states that she has cut back significantly on her smoking, although not quit yet.  I have encouraged her to continue working on this.  We will continue to closely monitor.  · Hemoglobin 14.6 on 6/8/2021 at the meantime he has worsening macrocytosis .6.    · Laboratory studies 6/22/2021 reported excellent folate more than 20 ng/mL, however low normal B12 level 357,000.  · On 7/6/2021, normal hemoglobin 15.8, .9 and HCT 47.2%.  · 7/20/2021 hemoglobin remains normal at 15.8    6.  Peripheral neuropathy secondary to chemotherapy.   · This is mild involving bilateral hands and feet as reported on 9/16/2020.   · Will avoid chemotherapy with oxaliplatin.  · Stable mild neuropathy as of 11/10/2020  · Patient reports worsening peripheral neuropathy and cycle #5 chemotherapy on 12/8/2020 with and without irinotecan.   · On 1/5/2021, patient reports improvement of peripheral neuropathy, will add irinotecan back to chemotherapy.  Continue to monitor and adjust medication.  · On 3/2/2021, patient reports no worsening of peripheral neuropathy after restarting irinotecan.   · This remains stable as of 8/3/2021    7.  History of hand-foot syndrome grade 3.    · It become so significant after cycle #7 FOLFOX treatment.  Discussed with patient on 5/13/2020, will discontinue the bolus 5-FU dose, and also decrease 50% of the infusion dose through the pump.   · We also use Medrol Dosepak for possible recurrent  symptomology.  She responded this well.   · Her hand-foot syndrome improved.  · Under current treatment with FOLFIRI, patient not receiving 5-FU bolus.  No recurrent issues.   · Patient will be switched to maintenance chemotherapy using Xeloda in late April 2021.  · Following 5 doses of Xeloda, significant hand-foot syndrome with pain in the feet, significant erythema.  Xeloda held.  Will be further discussed with Dr. Nguyen to determine if the patient will resume 5-FU versus a dose reduction to Xeloda.  · Aggressive emollient moisturizer use twice daily for the past week and patient reports improvement in the dryness and erythema.  Xeloda will now be discontinued and patient will switch back to 5-FU.  · As of 5/25/2021 dryness and erythema/hyperpigmentation continues to improve.  Xeloda has been discontinued.  · 6/8/2021, patient reports much improved hand-foot syndrome, with remnant pigmentation on palms.  · On 7/6/2021, patient reports and had a some flareup of hand-foot syndrome, however improved after aggressive moisturizing cream and the urea cream.  Overall much tolerated infusional 5-FU compared to Xeloda.    · 7/20/2021 continues to have mild hyperpigmentation of her hands and feet bilaterally, she continues aggressive moisturization with utter balm with urea.  She also reports some soreness of her tailbone, and we discussed that this is likely due to loss of weight and muscle mass.  I advised her to go ahead and apply moisturizer to this area as there is one tiny fissure.  Also recommended using a waffle pad or doughnut to sit on.  · 8/3/2021 patient reports her hand foot symptoms are stable and without worsening.  Continue to monitor     8.  Hyperlacrimation and mild scleral irritation related to 5-FU.  She has been taking steroid eyedrops.  This has improved her hyperlacrimation.  · Not currently an issue.  Continue to monitor with 5-FU.      9.  Abnormal liver function panel.  · Improved liver function  panel 9/18/2020.  This is probably related to her recent infection.  · She has normalized AST, alk phosphatase, and a much improved only marginally elevated ALT 35 on 10/13/2020.    · Normalized liver function panel on 10/27/2020.    · Normal liver panel on 11/10/2020.    · Normal liver function panel on 12/5/2021.    · Slightly worsening liver function with AST 33 and ALT 56 on 1/19/2021.    · Improved AST 21 and ALT 39 on 2/2/2021.    · ALT 59 and AST 29 on 3/2/2021.    · ALT 56, AST 24, alk phosphatase 121 and total bilirubin 0.3 on 4/13/2021.    · 6/8/2021, only marginally elevated ALT 48 otherwise normal liver function panel.    · On 7/6/2021, ALT 42, normal AST ALT and total bilirubin.  Continue to monitor.   · 8/3/2021 AST 30, ALT 51, t bili 0.2    10.  Intermittent leukocytopenia secondary to chemotherapy.   · WBC currently normal at 5220 and the neutrophil 3520 on 1/5/2021.  Continue to monitor.    · On 2/2/2021, WBC 4110 including ANC 2540.  Continue to monitor for now.  Consider G-CSF if neutropenia becomes an ongoing issue.   · 2/16/2021 WBC 4.6, ANC 2.79.    · 3/16/2021 WBC 3.93, ANC 2.26, continue to monitor.  · On 4/13/2021 WBC 4250 including ANC 2640.  · 5/25/2021 WBC 4.21 ANC 2.35.    · 6/22/2021 WBC stable at 6.07, ANC 4.33.  · 7/6/2021 WBC 5120 and ANC 3450.  Continue to monitor.  · 8/3/2021 WBC 4.99, ANC 3010    11.  Depression.  Patient seen by JULIET Davenport on 11/9/2020.  She was started on mirtazapine.  Lexapro was discontinued.  This has definitely improved her mood with the patient feeling overall much better.  She is sleeping better.  Appetite is improved with her actually eating more than she wants to.    · Condition is stable.  She plans to continue follow-up with supportive oncology.    12.  Intermittent upper abdominal discomfort.  This improves with eating.  After further discussion with the patient she also notes 3 nights of increased reflux when laying down.  We discussed her  symptoms could be related to increase stomach acid or potential ulcer.  She is currently taking Pepcid 20 mg daily.  I instructed her to add Prilosec 20 mg daily.   · On 3/16/2021 patient reports some worsening reflux symptoms last week.  She has increased Pepcid to 20 mg twice daily, which did resolve her symptoms.  We discussed she could add Prilosec 20 mg daily as well.   · No complaint today.  Continue to monitor.    13.  Proteinuria: 3/30/2020: Patient is 2+ protein in her urine.  We will continue with Avastin and closely monitor.  No need for 24-hour urine at this time.  · Persistent 2+ proteinuria on 4/13/2021.  continue to monitor.  · 5/25/2021 protein urine only 1+.    · 6/22/2021 persistent 1+ proteinuria.  Continue to monitor.  · 7/6/2021, trace protein.  Continue Avastin treatment and monitoring.  · 8/3/2021 1+ protein, stable from last cycle     14.  Tachycardia:   · Patient was seen by Dr. Bustos cardiologist on 4/6/2021.   · Follow up visist 7/22/2021 with plans to repeat evaluation in 3 months    15.  Candidiasis:  · Present in the skin fold of the abdomen.  I prescribed nystatin cream.  This has resolved.    PLAN:  1. Proceed with cycle 9 maintenance chemotherapy with infusional 5-FU, leucovorin, and Avastin today.    2. Continue aggressive moisturizing cessation to hands and feet.  3. Discussed using a waffle pad or doughnut to sit on for tailbone discomfort.  We also discussed using moisturizer on this area.  4. Continue Lovenox 1 mg/kg twice daily.  5. Continue antiemetics if needed for nausea.  6. Continue oral B12 1000 mcg daily.  7. Return in 2 weeks for follow-up visit with Dr. Nguyen with repeat labs and reevaluation prior to cycle 10 maintenance infusional 5-FU, leucovorin, Avastin.  8. Plan to repeat a PET scan following 12 maintenance treatments.  At that timecould consider give her a short chemotherapy holiday, or change maintenance chemotherapy to every 3 weeks or even every 4 weeks.   We discussed this after PET scan.  9. Call/return sooner should she palpate new concerns or problems.      Patient on high risk medication requiring close monitoring for toxicity.    Katiuska Moseley, JULIET  08/03/21      CC:  Deepika Vela III NP-MD Perla Rahman MD

## 2021-08-05 ENCOUNTER — INFUSION (OUTPATIENT)
Dept: ONCOLOGY | Facility: HOSPITAL | Age: 41
End: 2021-08-05

## 2021-08-05 DIAGNOSIS — Z45.2 ENCOUNTER FOR FITTING AND ADJUSTMENT OF VASCULAR CATHETER: Primary | ICD-10-CM

## 2021-08-05 PROCEDURE — 25010000002 HEPARIN LOCK FLUSH PER 10 UNITS: Performed by: INTERNAL MEDICINE

## 2021-08-05 RX ORDER — SODIUM CHLORIDE 0.9 % (FLUSH) 0.9 %
10 SYRINGE (ML) INJECTION AS NEEDED
Status: CANCELLED | OUTPATIENT
Start: 2021-08-05

## 2021-08-05 RX ORDER — HEPARIN SODIUM (PORCINE) LOCK FLUSH IV SOLN 100 UNIT/ML 100 UNIT/ML
500 SOLUTION INTRAVENOUS AS NEEDED
Status: CANCELLED | OUTPATIENT
Start: 2021-08-05

## 2021-08-05 RX ORDER — SODIUM CHLORIDE 0.9 % (FLUSH) 0.9 %
10 SYRINGE (ML) INJECTION AS NEEDED
Status: DISCONTINUED | OUTPATIENT
Start: 2021-08-05 | End: 2021-08-05 | Stop reason: HOSPADM

## 2021-08-05 RX ORDER — HEPARIN SODIUM (PORCINE) LOCK FLUSH IV SOLN 100 UNIT/ML 100 UNIT/ML
500 SOLUTION INTRAVENOUS AS NEEDED
Status: DISCONTINUED | OUTPATIENT
Start: 2021-08-05 | End: 2021-08-05 | Stop reason: HOSPADM

## 2021-08-05 RX ADMIN — Medication 500 UNITS: at 13:03

## 2021-08-05 RX ADMIN — SODIUM CHLORIDE, PRESERVATIVE FREE 10 ML: 5 INJECTION INTRAVENOUS at 13:03

## 2021-08-17 ENCOUNTER — INFUSION (OUTPATIENT)
Dept: ONCOLOGY | Facility: HOSPITAL | Age: 41
End: 2021-08-17

## 2021-08-17 ENCOUNTER — OFFICE VISIT (OUTPATIENT)
Dept: ONCOLOGY | Facility: CLINIC | Age: 41
End: 2021-08-17

## 2021-08-17 VITALS
DIASTOLIC BLOOD PRESSURE: 91 MMHG | SYSTOLIC BLOOD PRESSURE: 130 MMHG | WEIGHT: 187.1 LBS | OXYGEN SATURATION: 95 % | HEART RATE: 136 BPM | RESPIRATION RATE: 16 BRPM | HEIGHT: 65 IN | BODY MASS INDEX: 31.17 KG/M2 | TEMPERATURE: 97.3 F

## 2021-08-17 VITALS — HEART RATE: 97 BPM | DIASTOLIC BLOOD PRESSURE: 73 MMHG | SYSTOLIC BLOOD PRESSURE: 123 MMHG

## 2021-08-17 DIAGNOSIS — C20 ADENOCARCINOMA OF RECTUM (HCC): Primary | ICD-10-CM

## 2021-08-17 DIAGNOSIS — C18.9 MALIGNANT NEOPLASM OF COLON, UNSPECIFIED PART OF COLON (HCC): Chronic | ICD-10-CM

## 2021-08-17 DIAGNOSIS — I10 ESSENTIAL HYPERTENSION: Chronic | ICD-10-CM

## 2021-08-17 DIAGNOSIS — C18.9 MALIGNANT NEOPLASM OF COLON, UNSPECIFIED PART OF COLON (HCC): Primary | Chronic | ICD-10-CM

## 2021-08-17 DIAGNOSIS — C20 ADENOCARCINOMA OF RECTUM (HCC): Chronic | ICD-10-CM

## 2021-08-17 DIAGNOSIS — R82.90 ABNORMAL URINALYSIS: ICD-10-CM

## 2021-08-17 DIAGNOSIS — C78.00 MALIGNANT NEOPLASM METASTATIC TO LUNG, UNSPECIFIED LATERALITY (HCC): ICD-10-CM

## 2021-08-17 LAB
ALBUMIN SERPL-MCNC: 4 G/DL (ref 3.5–5.2)
ALBUMIN/GLOB SERPL: 1.2 G/DL (ref 1.1–2.4)
ALP SERPL-CCNC: 112 U/L (ref 38–116)
ALT SERPL W P-5'-P-CCNC: 77 U/L (ref 0–33)
ANION GAP SERPL CALCULATED.3IONS-SCNC: 13.6 MMOL/L (ref 5–15)
AST SERPL-CCNC: 52 U/L (ref 0–32)
BASOPHILS # BLD AUTO: 0.03 10*3/MM3 (ref 0–0.2)
BASOPHILS NFR BLD AUTO: 0.6 % (ref 0–1.5)
BILIRUB SERPL-MCNC: 0.5 MG/DL (ref 0.2–1.2)
BILIRUB UR QL STRIP: ABNORMAL
BUN SERPL-MCNC: 9 MG/DL (ref 6–20)
BUN/CREAT SERPL: 10.3 (ref 7.3–30)
CALCIUM SPEC-SCNC: 9.5 MG/DL (ref 8.5–10.2)
CHLORIDE SERPL-SCNC: 99 MMOL/L (ref 98–107)
CLARITY UR: ABNORMAL
CO2 SERPL-SCNC: 23.4 MMOL/L (ref 22–29)
COLOR UR: YELLOW
CREAT SERPL-MCNC: 0.87 MG/DL (ref 0.6–1.1)
DEPRECATED RDW RBC AUTO: 54.1 FL (ref 37–54)
EOSINOPHIL # BLD AUTO: 0.2 10*3/MM3 (ref 0–0.4)
EOSINOPHIL NFR BLD AUTO: 3.7 % (ref 0.3–6.2)
ERYTHROCYTE [DISTWIDTH] IN BLOOD BY AUTOMATED COUNT: 14.2 % (ref 12.3–15.4)
GFR SERPL CREATININE-BSD FRML MDRD: 71 ML/MIN/1.73
GLOBULIN UR ELPH-MCNC: 3.3 GM/DL (ref 1.8–3.5)
GLUCOSE SERPL-MCNC: 144 MG/DL (ref 74–124)
GLUCOSE UR STRIP-MCNC: NEGATIVE MG/DL
HCT VFR BLD AUTO: 50 % (ref 34–46.6)
HGB BLD-MCNC: 17 G/DL (ref 12–15.9)
HGB UR QL STRIP.AUTO: ABNORMAL
IMM GRANULOCYTES # BLD AUTO: 0.01 10*3/MM3 (ref 0–0.05)
IMM GRANULOCYTES NFR BLD AUTO: 0.2 % (ref 0–0.5)
KETONES UR QL STRIP: NEGATIVE
LEUKOCYTE ESTERASE UR QL STRIP.AUTO: ABNORMAL
LYMPHOCYTES # BLD AUTO: 1.05 10*3/MM3 (ref 0.7–3.1)
LYMPHOCYTES NFR BLD AUTO: 19.4 % (ref 19.6–45.3)
MCH RBC QN AUTO: 34.8 PG (ref 26.6–33)
MCHC RBC AUTO-ENTMCNC: 34 G/DL (ref 31.5–35.7)
MCV RBC AUTO: 102.5 FL (ref 79–97)
MONOCYTES # BLD AUTO: 0.47 10*3/MM3 (ref 0.1–0.9)
MONOCYTES NFR BLD AUTO: 8.7 % (ref 5–12)
NEUTROPHILS NFR BLD AUTO: 3.66 10*3/MM3 (ref 1.7–7)
NEUTROPHILS NFR BLD AUTO: 67.4 % (ref 42.7–76)
NITRITE UR QL STRIP: NEGATIVE
NRBC BLD AUTO-RTO: 0 /100 WBC (ref 0–0.2)
PH UR STRIP.AUTO: 6 [PH] (ref 4.5–8)
PLATELET # BLD AUTO: 188 10*3/MM3 (ref 140–450)
PMV BLD AUTO: 9.3 FL (ref 6–12)
POTASSIUM SERPL-SCNC: 4 MMOL/L (ref 3.5–4.7)
PROT SERPL-MCNC: 7.3 G/DL (ref 6.3–8)
PROT UR QL STRIP: ABNORMAL
RBC # BLD AUTO: 4.88 10*6/MM3 (ref 3.77–5.28)
SODIUM SERPL-SCNC: 136 MMOL/L (ref 134–145)
SP GR UR STRIP: >=1.03 (ref 1–1.03)
UROBILINOGEN UR QL STRIP: ABNORMAL
WBC # BLD AUTO: 5.42 10*3/MM3 (ref 3.4–10.8)

## 2021-08-17 PROCEDURE — 87186 SC STD MICRODIL/AGAR DIL: CPT | Performed by: INTERNAL MEDICINE

## 2021-08-17 PROCEDURE — 96413 CHEMO IV INFUSION 1 HR: CPT

## 2021-08-17 PROCEDURE — 96375 TX/PRO/DX INJ NEW DRUG ADDON: CPT

## 2021-08-17 PROCEDURE — 25010000002 FLUOROURACIL PER 500 MG: Performed by: INTERNAL MEDICINE

## 2021-08-17 PROCEDURE — 25010000002 LEUCOVORIN CALCIUM PER 50 MG: Performed by: INTERNAL MEDICINE

## 2021-08-17 PROCEDURE — 96416 CHEMO PROLONG INFUSE W/PUMP: CPT

## 2021-08-17 PROCEDURE — 99215 OFFICE O/P EST HI 40 MIN: CPT | Performed by: INTERNAL MEDICINE

## 2021-08-17 PROCEDURE — 81003 URINALYSIS AUTO W/O SCOPE: CPT

## 2021-08-17 PROCEDURE — 87086 URINE CULTURE/COLONY COUNT: CPT | Performed by: INTERNAL MEDICINE

## 2021-08-17 PROCEDURE — 85025 COMPLETE CBC W/AUTO DIFF WBC: CPT

## 2021-08-17 PROCEDURE — 96367 TX/PROPH/DG ADDL SEQ IV INF: CPT

## 2021-08-17 PROCEDURE — 80053 COMPREHEN METABOLIC PANEL: CPT

## 2021-08-17 PROCEDURE — 25010000002 BEVACIZUMAB PER 10 MG: Performed by: INTERNAL MEDICINE

## 2021-08-17 PROCEDURE — 25010000002 DEXAMETHASONE SODIUM PHOSPHATE 100 MG/10ML SOLUTION: Performed by: INTERNAL MEDICINE

## 2021-08-17 PROCEDURE — 25010000002 PALONOSETRON PER 25 MCG: Performed by: INTERNAL MEDICINE

## 2021-08-17 PROCEDURE — 25010000002 FOSAPREPITANT PER 1 MG: Performed by: INTERNAL MEDICINE

## 2021-08-17 PROCEDURE — 87077 CULTURE AEROBIC IDENTIFY: CPT | Performed by: INTERNAL MEDICINE

## 2021-08-17 PROCEDURE — 25010000002 BEVACIZUMAB 100 MG/4ML SOLUTION 4 ML VIAL: Performed by: INTERNAL MEDICINE

## 2021-08-17 RX ORDER — SODIUM CHLORIDE 0.9 % (FLUSH) 0.9 %
10 SYRINGE (ML) INJECTION AS NEEDED
Status: CANCELLED | OUTPATIENT
Start: 2021-08-17

## 2021-08-17 RX ORDER — CLONIDINE HYDROCHLORIDE 0.1 MG/1
0.1 TABLET ORAL ONCE
Status: COMPLETED | OUTPATIENT
Start: 2021-08-17 | End: 2021-08-17

## 2021-08-17 RX ORDER — HEPARIN SODIUM (PORCINE) LOCK FLUSH IV SOLN 100 UNIT/ML 100 UNIT/ML
500 SOLUTION INTRAVENOUS AS NEEDED
Status: CANCELLED | OUTPATIENT
Start: 2021-08-17

## 2021-08-17 RX ORDER — PALONOSETRON 0.05 MG/ML
0.25 INJECTION, SOLUTION INTRAVENOUS ONCE
Status: COMPLETED | OUTPATIENT
Start: 2021-08-17 | End: 2021-08-17

## 2021-08-17 RX ORDER — SODIUM CHLORIDE 9 MG/ML
250 INJECTION, SOLUTION INTRAVENOUS ONCE
Status: COMPLETED | OUTPATIENT
Start: 2021-08-17 | End: 2021-08-17

## 2021-08-17 RX ORDER — PALONOSETRON 0.05 MG/ML
0.25 INJECTION, SOLUTION INTRAVENOUS ONCE
Status: CANCELLED | OUTPATIENT
Start: 2021-08-17

## 2021-08-17 RX ORDER — LISINOPRIL 10 MG/1
10 TABLET ORAL DAILY
Qty: 30 TABLET | Refills: 1 | Status: SHIPPED | OUTPATIENT
Start: 2021-08-17 | End: 2021-10-15

## 2021-08-17 RX ORDER — SODIUM CHLORIDE 9 MG/ML
250 INJECTION, SOLUTION INTRAVENOUS ONCE
Status: CANCELLED | OUTPATIENT
Start: 2021-08-17

## 2021-08-17 RX ADMIN — BEVACIZUMAB 900 MG: 400 INJECTION, SOLUTION INTRAVENOUS at 14:37

## 2021-08-17 RX ADMIN — DEXAMETHASONE SODIUM PHOSPHATE 12 MG: 10 INJECTION, SOLUTION INTRAMUSCULAR; INTRAVENOUS at 14:13

## 2021-08-17 RX ADMIN — CLONIDINE HYDROCHLORIDE 0.1 MG: 0.1 TABLET ORAL at 12:50

## 2021-08-17 RX ADMIN — LEUCOVORIN CALCIUM 810 MG: 350 INJECTION, POWDER, LYOPHILIZED, FOR SUSPENSION INTRAMUSCULAR; INTRAVENOUS at 15:16

## 2021-08-17 RX ADMIN — SODIUM CHLORIDE 100 ML: 9 INJECTION, SOLUTION INTRAVENOUS at 13:37

## 2021-08-17 RX ADMIN — FLUOROURACIL 4850 MG: 50 INJECTION, SOLUTION INTRAVENOUS at 15:49

## 2021-08-17 RX ADMIN — PALONOSETRON 0.25 MG: 0.05 INJECTION, SOLUTION INTRAVENOUS at 13:35

## 2021-08-17 RX ADMIN — SODIUM CHLORIDE 250 ML: 9 INJECTION, SOLUTION INTRAVENOUS at 13:35

## 2021-08-17 NOTE — NURSING NOTE
Pt here today for chemo.    Lab Results   Component Value Date    GLUCOSE 144 (H) 08/17/2021    BUN 9 08/17/2021    CREATININE 0.87 08/17/2021    EGFRIFNONA 71 08/17/2021    BCR 10.3 08/17/2021    K 4.0 08/17/2021    CO2 23.4 08/17/2021    CALCIUM 9.5 08/17/2021    ALBUMIN 4.00 08/17/2021    AST 52 (H) 08/17/2021    ALT 77 (H) 08/17/2021   Liver enzymes reviewed with Dr. Nguyen.  OK to proceed with chemo.  B/P elevated with diastolic numbers in the 90s.  Dr. Nguyen aware and order placed for Lisinopril at home and 0.1 mg Clonidine po now.  B/P down to 142/85,  after Clonidine.  Chemo ordered after B/P down.  Urine protein 2 +.  Dr Nguyen back to speak to patient.

## 2021-08-17 NOTE — PROGRESS NOTES
REASON FOR FOLLOW UP:     1. Rectal cancer, rectal ultrasound examination in July 2019 reported T3N0 disease without lymphadenopathy. She does have small pulmonary nodule 6-7 mm and 2 mm with indeterminate feature. There is no solid evidence of metastatic disease otherwise. Patient has stage IIA (T3N0M0) disease.  2. The patient is heterozygous factor V Leiden, was on prophylactic anticoagulation with Lovenox 40 mg daily given her increased risk of thrombosis with MediPort and GI malignancy.   3. PET scan on 8/8/2019 reported an 8 mm hypermetabolic right upper lobe pulmonary nodule, which is suspicious for metastatic as well.    4. Patient had a PowerPort placement on the left upper chest by Dr. Joseph on 8/9/2019.  5. Patient was started on concurrent infusional 5-FU chemoradiation therapy on 8/12/2019, with planned complete surgical resection and further adjuvant chemotherapy with intention to cure the disease.   6. Patient underwent surgical resection of the primary rectal cancer by Dr. Ye on 12/2/2019.  Pathology evaluation reported residual T3N1 disease stage IIIb.  7. Diarrhea related to therapy and radiation.   8. Patient was started cycle 1 day 1 of adjuvant FOLFOX 6 on 1/23/2020.  9. On 2/5/2020 FOLFOX 6 cycle 2 delayed secondary to neutropenia.  Patient was given 3 days of Granix injection.  After cycle #2 we planned 3 days of Granix with each cycle.   10. Patient also intolerant of oral iron.  Patient received 2 doses of IV injectafer on 02/05/2020 and 02/12/2020.   11. 02/12/2020 Proceed with cycle #2 of FOLFOX 6 with 3 days of Granix.  12. On 3/11/2020 cycle 4 postponed secondary to abdominal pain and occasional low-grade fevers.  CT scans ordered.  13. Cycle #4 resumed after CT scan revealed no evidence of disease.  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 Ye. patient has no fever.  Continue to observe.   14. Grade 3  hand-foot syndrome secondary to 5-FU after cycle #7 FOLFOX 6 chemotherapy, prompting ER visit.  Also has worsening peripheral neuropathy.   15. Cycle #8 FOLFOX 6 was given on 5/13/2020, with 50% dose reduction for both 5-FU and oxaliplatin, and also examination of bolus 5-FU.   16. PET scan examination on 5/21/2020 reported no evidence of metastatic disease in the chest abdomen pelvis.  Stable 6 mm RUL pulmonary nodule.  17. On 5/27/2020, I discussed with the patient that we can discontinue chemotherapy at this time.   18. Patient had a surgical procedure for low anterior colon resection, coloanal anastomosis on 8/3/2020.  19. CT scan for chest abdomen pelvis on 9/9/2020 reported a new 8 mm noncalcified pulmonary nodule in the left lower lobe of the lung.  Otherwise stable right upper lobe 6 mm pulmonary nodule, and no evidence of disease recurrence in the abdomen or pelvis.  20. PET scan examination on 9/18/2020 reported multiple hypermetabolic small pulmonary nodules/ new pulmonary nodules.   21. Patient was started on pelvic chemotherapy with FOLFIRI regimen on 10/13/2020.   22. 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 It reported tumor mutation burden 5 Muts/Mb, microsatellite stable, TP53 mutation R282W, and others.   23. Cycle #5 was without irinotecan, due to peripheral neuropathy.  24. 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  Patient was switched to weight-based Lovenox injection.  25. Cycle #6 5-FU and irinotecan was delayed by 2 weeks because of the above incident.  26. Patient had a telemedicine evaluation at that the [facility] [place] in February 2021.  They agreed with our treatment plan for palliative chemotherapy followed by maintenance chemotherapy.    27. PET scan examination on 4/6/2021 after cycle #12 palliative chemotherapy reported no  evidence of hypermetabolic metastatic lesion.   28. Patient was started on maintenance chemotherapy with 5-FU and Avastin on 4/13/2021. (Unable to tolerate Xeloda because of a significant hand-foot syndrome).       HISTORY OF PRESENT ILLNESS:  The patient is a 41 y.o. female the above-mentioned history who is here today for lab review and evaluation prior to her maintenance chemotherapy with 5-FU, leucovorin, Avastin.      Patient reports she is overall doing well, denies nausea no chest pain no dyspnea.  Has easy bruising from Lovenox injection, however no spontaneous bleeding otherwise.    She does continue with the hand-foot symptoms such as hyperpigmentation.  She continues to use utter balm with urea.  She has no blistering or peeling currently.    Laboratory study today 8/17/2021 reported elevated hemoglobin 17.0 hematocrit of 50.0%.  Improved .5.  Normal platelets 188,000 and a WBC 5420 including neutrophils 3660.      Past Medical History:   Diagnosis Date   • Abdominopelvic abscess (CMS/HCC) 08/12/2020    ADMITTED TO [facility]   • Abnormal Pap smear of cervix 02/02/1998    JULIUS I   • Anemia in pregnancy    • Anxiety    • Bilateral epiphora 04/2020   • Bilateral hand swelling 05/02/2020    SEEN AT [facility] ER   • Cervical lymphadenitis 06/06/2012    SEEN AT [facility] ER   • Chemotherapy induced diarrhea 01/2021   • Chemotherapy induced neutropenia (CMS/HCC) 04/2020   • Chemotherapy-induced nausea 02/2020   • Chemotherapy-induced thrombocytopenia 05/2020   • Chronic diarrhea    • Colon polyps     FOLLOWED BY DR. GERONIMO ESPARZA   • Cystitis 04/24/2020    WITH DEHYDRATION, ADMITTED TO [facility]   • Cystitis 10/27/2020   • Depression    • Drug-induced peripheral neuropathy (CMS/HCC) 05/2020   • Factor V Leiden mutation (CMS/HCC)    • Fistula of intestine    • GERD (gastroesophageal reflux disease)    • Hand foot syndrome 05/2020   • Heart murmur     IN CHILDHOOD   • Hematochezia    • Hemorrhoids    • Heterozygous factor V Leiden  mutation (CMS/[facility])     DX 2019   • History of anemia    • History of chemotherapy     FOLLOWED BY DR. ALEXANDRU HUNT   • History of gestational diabetes    • History of pre-eclampsia    • History of pre-eclampsia    • History of radiation therapy     FOLLOWED BY DR. JAVON LEWIS   • History of TB skin testing 2009    TB Skin Test   • HPV in female    • Hypokalemia 2019   • Hypomagnesemia 2019   • Hyponatremia 2021   • IBS (irritable bowel syndrome)    • Ileostomy in place (CMS/[facility])     FOLLOWED BY DR. GERONIMO ESPARZA   • Infected insect bite of neck 2016    SEEN AT [facility]   • Kidney stones 2007    SEEN AT [facility] ER   • Lump of right breast     SWOLLEN LYMPH NODE   • Lung cancer (CMS/[facility]) 2020    METASTATIC LUNG CANCER   • Macrocytosis 2021   • Monopolar depression (CMS/[facility])    • On anticoagulant therapy    • Palmar-plantar erythrodysesthesia 2021   • Perirectal abscess 2020   • Pulmonary embolism without acute cor pulmonale (CMS/[facility]) 09/20/2019    X 3; ADMITTED TO [facility]   • Pulmonary nodule, right 2020   • Rectal cancer (CMS/[facility]) 2019    STAGE IIA, INVASIVE MODERATELY DIFFERENTIATED ADENOCARCINOMA, CHEMO AND XRT FINISHED 2019   • Right shoulder pain 2020    SEEN AT [facility] ER   • Right ureteral stone 10/01/2019    SEEN AT [facility] ER   • SAB (spontaneous ) 1996     A2-1 INDUCED   • Sciatica    • Sepsis due to cellulitis (CMS/[facility]) 2002    LEFT GREAT TOE, ADMITTED TO [facility]   • Tachycardia 2020   • 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 JAMEY TALAVERA AT [facility]   • COLON RESECTION N/A 2019    Procedure: laparoscopic low anterior colon resection with mobilization of splenic flexure  and diverting loop ileostomy: ERAS;  Surgeon: Padmaja Esparza MD;  Location: [facility] MAIN OR;  Service: General   • COLON RESECTION N/A 8/3/2020    Procedure: LOW ANTERIOR COLON RESECTION, COLOANAL ANASTOMOSIS, MOBILIZATION SPLENIC FLEXURE;  Surgeon: Padmaja Esparza MD;  Location: [facility] MAIN OR;  Service: General;  Laterality: N/A;   • COLONOSCOPY N/A 7/15/2020    PATENT ANASTAMOSIS IN MID RECTUM, RESCOPE IN 1 YR, DR. PADMAJA ESPARZA AT [facility]   • COLONOSCOPY W/ POLYPECTOMY N/A 07/08/2019    15 MM TUBULOVILLOUS ADENOMA POLYP IN HEPATIC FLEXURE, 20 MMTUBULOVILLOUS ADENOMA WITH HIGH GRADE DYSPLASIA IN RECTOSIGMOID, 4 CM MASS IN MID RECTUM, PATH: INVASIVE MODERATELY DIFFERENTIATED ADENOCARCINOMA, DR. JENNIFER LI AT [facility]   • DILATATION AND EVACUATION N/A 2009   • ENDOSCOPY N/A 07/08/2019    LA GRADE A ESOPHAGITIS, GASTRITIS, ALL BIOPSIES BENIGN, DR. JENNIFER LI AT [facility]   • INCISION AND DRAINAGE PERIRECTAL ABSCESS N/A 8/14/2020    Procedure: INCISION AND DRAINAGE OF retrorectal dehiscence abcess with drain placement and irrigation;  Surgeon: Padmaja Esparza MD;  Location: [facility] OR;  Service: General;  Laterality: N/A;   • PAP SMEAR N/A 01/24/2014   • SIGMOIDOSCOPY N/A 7/24/2019    INFILTRATIVE PARTIALLY OBSTRUCTING LARGE RECTAL CANCER, AREA TATOOED, DR. PADMAJA ESPARZA AT [facility]   • SIGMOIDOSCOPY N/A 11/23/2019    AN ULCERATED PARTIALLY OBSTRUCTING MASS IN MID RECTUM, AREA TATTOOED, DR. PADMAJA ESPARZA AT [facility]   • SIGMOIDOSCOPY N/A 7/22/2021    PATENT ANASTAMOSIS IN DISTAL RECTUM, RESCOPE IN 1 YR, DR. PADMAJA ESPARZA AT [facility]   • TRANSRECTAL ULTRASOUND N/A 7/24/2019    Procedure: ULTRASOUND TRANSRECTAL;  Surgeon: Padmaja Esparza MD;  Location: [facility] ENDOSCOPY;  Service: General   • URETEROSCOPY LASER LITHOTRIPSY WITH STENT INSERTION Right 10/30/2019    DR. ESTUARDO BEASLEY AT [place]   • VAGINAL DELIVERY N/A 09/18/1998   • VENOUS ACCESS DEVICE (PORT) INSERTION Left 8/9/2019    Procedure:  INSERTION VENOUS ACCESS DEVICE;  Surgeon: Sj Joseph MD;  Location:  [facility] [place] [place];  Service: General   • VENOUS ACCESS DEVICE (PORT) INSERTION N/A 12/18/2020    Procedure: INSERTION VENOUS ACCESS DEVICE right side, removal venous access device left side;  Surgeon: Sj Joseph MD;  Location:  [facility] [place] [place];  Service: General;  Laterality: N/A;   • WISDOM TOOTH EXTRACTION Bilateral 1993       HEMATOLOGIC/ONCOLOGIC HISTORY:      The patient reports she has intermittent rectal bleeding and urgency, mucous with her stool, starting sometime in 2016. At that time she was referred to GI service, and was diagnosed of irritable bowel syndrome. Nevertheless she had worsening urgency for bowel movement, and had a couple of incidents losing control of stool. She was recently seen by Roland Thorpe MD again and had colonoscopy and EGD exam on 07/08/2019. She was found having a circumferential rectal mass. According to the procedure note, the patient had a fungating circumferential bleeding 4 cm mass in the middle rectum, at distance between 13 cm and 17 cm from the anus. Mass was causing partial obstruction. There were also colon polyps found at the hepatic flexure and also at the rectosigmoid junction 23 cm from the anus. Both were resected and retrieved. EGD examination reported grade A esophagitis at the GE junction and patchy discontinuous erythema and aggravation of the mucosa without bleeding in the stomach body. There is normal mucosa of the duodenum. Pathology evaluation from this procedure reported moderately differentiated adenocarcinoma involving the rectal mass. The rectal sigmoid junction polyp was tubular/tubulovillous adenoma with high grade dysplasia negative for invasive malignancy. The hepatic flexure polyp was also tubular/tubulovillous adenoma negative for high grade dysplasia or malignancy. The biopsy from the esophagus reports squamocolumnar mucosa with inflammatory changes suggestive of mild  reflux esophagitis, negative for interstitial metaplasia. Gastric biopsy was benign and duodenal biopsy was also benign. There is no mention of H-pylori.     Patient was subsequently referred to colorectal surgeon Padmaja Ye MD for further evaluation. The patient had CT scan examination for chest, abdomen and pelvis requested by Dr. Ye and were done on 07/13/2019. The study reported no evidence of lymphadenopathy in the abdomen and pelvis. There was wall thickening of the rectosigmoid junction. Normal spleen, pancreas, adrenal glands and kidneys. There was fatty liver infiltration but no focal lymphatic lesions. There was a small 6-7 mm noncalcified nodule in the right upper lobe and a tiny 3 mm subpleural nodule in the right middle lobe. No mediastinal or hilar lymphadenopathy.     Dr. Ye performed staging rectal ultrasound examination. This study reported tumor penetrating through the muscularis propria as T3 disease; there were no lymph nodes identified.    She had a hospitalization in late September 2019 because of newly discovered pulmonary emboli.  She was on prophylactic Lovenox prior to the incident of PE.  Now she is on full dose Xarelto anticoagulation.  Patient reports no further chest pain dyspnea.  She denies bleeding or bruising.  During her hospitalization, she was seen by Dr. Ye, who plans to have surgery 8 to 12 weeks after finishing radiation therapy.  She finished her radiation on 9/19/2019.     I noticed patient also presented to the emergency room on 10/1/2019 complaining of right flank area pain.  I reviewed the images studies and indeed she has a very small 1 to 2 mm obstructing kidney stone in the UV junction.  Patient is still symptomatic with some pains and dysuria.  She denies fever sweating or chills.    Laboratory study on 10/7/2019 reported normal CBC including hemoglobin 13.1, platelets 301,000, WBC 6170 and ANC 4900 lymphocytes 590 monocytes 480.      The nurse reported  malfunction of port-a-catheter on 10/7/2019.  Port study on 10/8/2019 showed fibrin sheath around the distal aspect of the Mediport catheter in the SVC. This does not appear to hinder injection, but does prevent aspiration at this time.    Patient underwent surgical resection of the colon on 12/2/2019 with Dr. Ye.  Pathology evaluation reported residual T3 disease, also 1 out of 14 lymph nodes positive for malignancy.  So this patient in either had at least stage IIIb disease (T3 N1 M0?).      Adjuvant chemotherapy FOLFOX 6 will be started on 1/22/2020.  Laboratory study reported iron saturation 10%, free iron 31 TIBC 319 and ferritin 168.  Her hemoglobin was 11.8, WBC 5600, and platelets 347,000.    Patient was here on 02/12/2020 for cycle 2 of her FOLFOX.  This is delayed x1 week secondary to neutropenia.  The patient ultimately received 3 days worth of Neupogen with recovery of her blood counts.  Of note, the patient struggled with significant nausea without any episodes of vomiting following cycle #1 of chemotherapy resulting in significant weight loss.  She is up 12 pounds over the last week as her appetite has normalized.  We will add Emend to her care plan.    She is having several loose stools per her colostomy and has been hesitant to take Imodium due to prior history of constipation.  I encouraged her to try this with a maximum of 8 tablets/day.  She denies any infectious symptoms including fevers or chills.  No excess bleeding or bruising noted.  She had the expected cold sensitivity related to the Oxaliplatin for about 3 days following treatment.    Labs from 02/12/2020 demonstrated total white blood cell count of 5.14, ANC of 3.06, hemoglobin of 11.2, platelets of 211,000.  She was found to be iron deficient last week and is intolerant to oral iron secondary to GI distress.  For this reason, she initiated IV iron therapy with Injectafer last week.  She had received her second dose  02/12/2020    Patient has normalized hemoglobin 12.2 on 2/26/2020.    She reported on 5/5/2020 she had a recent visit to the emergency department for what was suspected to be an allergic reaction.  She called our on-call service on Saturday with reports of hand and face swelling.  She was instructed to proceed to the emergency department at which time she was assessed and prescribed a Medrol Dosepak.  She had just completed her 5-FU and was unhooked on Friday, 5/3/2020.  Her symptoms have improved.  She does report persistent hyperpigmentation and mild swelling of the palms of the hands but this is much improved.  It was her right hand specifically that was swollen.  Her facial swelling has resolved.  She continues on Cefdinir nd since has 1 day remaining, she was prescribed cefdinir for a UTI requiring hospitalization at the end of April.  Reports no new symptoms.  Her labs are stable.  She is scheduled for treatment again.    Patient states at this time she is not tolerating her chemotherapy well.     Patient seen in the emergency department on 5/2/2020 for what was suspected to be an allergic reaction.  She called our on-call service on Saturday explaining that she was experiencing hand and face swelling.  She was instructed to proceed to the emergency department at which time she was assessed and prescribed a Medrol Dosepak.  She had just completed her 5-FU and was unhooked on Friday, 5/1/2020.      She reports since her ED visit on 5/2/2020 her symptoms have not improved. Her hands and feet were swollen upon presenting to the ED and she could barely make a fist. She states that she feels so swollen she is not able to stand it. She states that her skin on the hands and feet are peeling extensively as well, besides changing color to more dark.     She also reports significant fatigue after her first week of the 5-FU treatment but she expected this side effect. She also notices significant increase in her  neuropathy to the point that she is not able to even walk around in her home without her house slippers due to irritation from her rugs. She denies and associated nausea or vomiting at this time. She also has episodes of epistaxis every day after the chemotherapy cycle #7.  She does report working full-time during the week of chemotherapy.    Laboratory studies, 5/13/2020, show moderate thrombocytopenia platelets 123,000, low normal WBC 4140 including ANC 2720 and normal hemoglobin 13.4.  Because significant hand-foot syndrome, will decrease both 5-FU and oxaliplatin by 50%, and eliminate bolus dose of 5-FU.    On 5/21/2020 patient had a PET scan performed which showed no convincing evidence of residual disease in abdomen or pelvis, no metastatic disease within the chest or neck.     Cycle #8 FOLFOX 6 was given on 5/13/2020, with 50% dose reduction for both 5-FU and oxaliplatin, and also examination of bolus 5-FU.  She states on 5/27/2020 that with the recent reduction of the chemotherapy she feels significantly better. She has more energy and is able to do her daily routine.      PET scan examination on 5/21/2020 reported no evidence of metastatic disease in chest abdomen pelvis.  There was a stable 6 mm right upper lobe pulmonary nodule.    Laboratory studies on 5/27/2020 showed mild leukocytopenia WBC 3720 but a normal ANC 2250 and lymphocytes 630.  Normal platelets 163,000 and hemoglobin 12.6.  Chemistry lab reported normal renal function, liver function panel and a electrolytes, elevated glucose 164.    Laboratory studies 6/24/2020, showed normal hemoglobin 13.4 but macrocytosis .9.  Normal platelets 210,000 and WBC 5870.  She had normal CMP.     Patient last time was here in late June 2020.  Since that time she had surgical procedure for low anterior colon resection, coloanal anastomosis on 8/3/2020.  She later developed a perirectal abscess, required surgical drainage on 8/14/2020.  She was discharged  on 8/27/2020.    Patient reports to me she still has lower abdominal wall vacuum suction in place.  She denies fever sweating chills.  Performance status is ECOG 1.  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  She does have visiting nurse come to the home for wound care.    CT scan for chest abdomen pelvis on 9/9/2020 reported a new 8 mm noncalcified pulmonary nodule in the left lower lobe.  Otherwise stable right upper lobe 6 mm pulmonary nodule, and no evidence of disease recurrence in the abdomen or pelvis.     Laboratory study on 9/16/2020 reported elevated AST 55, ALT 95, alk phosphatase 143 but normal total bilirubin 0.3.  Chemistry lab otherwise unremarkable.  She has completed normal CBC.      Because of the abnormal CT scan examination for chest abdomen pelvis on 9/9/2020 reported a new 8 mm noncalcified pulmonary nodule in the left lower lobe, we obtained a PET scan examination for further evaluation, which was done on 9/18/2020.  This study reported several pulmonary nodules, some of them are hypermetabolic, newly developed compared to previous PET scan in May 2020.  Certainly does highly suspicious for metastatic disease.  There are also hypermetabolic activity in the abdomen and pelvic area which is hard to differentiate from surgical scar versus metastatic malignancy.    Laboratory study on 10/13/2020 reported normal CBC with Hb 13.9, platelets 302,000 and WBC 5520 including ANC 3830.  Chemistry lab reported normalized AST 20, alk phosphatase 116 improved ALT 35, and maintains normal bilirubin, with normal electrolytes and liver function panel.     Patient was started on first cycle of palliative chemotherapy FOLFIRI on 10/13/2020.    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  Patient had a new port placed  12/18/2020, and had held her Xarelto for 2 days prior to surgery.  She presented to the ER on 12/21/20 with complaints of facial and arm swelling for 3 days.  She also noted increased shortness of breath.  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  Thrombus within the right internal jugular vein cannot be excluded.  Patient was started on IV heparin, and eventually transitioned to weight-based Lovenox, which she now continues.      MEDICATIONS    Current Outpatient Medications:   •  clonazePAM (KlonoPIN) 1 MG tablet, TAKE 1 TABLET BY MOUTH THREE TIMES A DAY AS NEEDED FOR ANXIETY OR SEIZURES, Disp: 90 tablet, Rfl: 0  •  Cyanocobalamin (VITAMIN B-12 PO), Take  by mouth., Disp: , Rfl:   •  dicyclomine (BENTYL) 20 MG tablet, TAKE 1 TABLET BY MOUTH EVERY 6 HOURS AS NEEDED, Disp: 360 tablet, Rfl: 0  •  diphenoxylate-atropine (LOMOTIL) 2.5-0.025 MG per tablet, Take 1 tablet by mouth 4 (Four) Times a Day As Needed for Diarrhea., Disp: 120 tablet, Rfl: 1  •  enoxaparin (LOVENOX) 100 MG/ML solution syringe, Inject 0.9 mL under the skin into the appropriate area as directed Every 12 (Twelve) Hours., Disp: 60 mL, Rfl: 2  •  escitalopram (LEXAPRO) 10 MG tablet, TAKE 1 TABLET BY MOUTH EVERY DAY, Disp: 30 tablet, Rfl: 5  •  famotidine (PEPCID) 20 MG tablet, TAKE 1 TABLET BY MOUTH TWICE A DAY, Disp: 180 tablet, Rfl: 1  •  fluorouracil in dextrose 5 % 250 mL infusion, Infuse  into a venous catheter 1 (One) Time. Takes twice a month., Disp: , Rfl:   •  Loratadine (CLARITIN) 10 MG capsule, Take 10 mg by mouth Every Evening., Disp: , Rfl:   •  mineral oil-hydrophilic petrolatum (AQUAPHOR) ointment, Apply 1 application topically to the appropriate area as directed As Needed for Dry Skin., Disp: , Rfl:   •  nystatin (MYCOSTATIN) [id number] UNIT/GM cream, Apply  topically to the appropriate area as directed 2 (Two) Times a Day As Needed (rash)., Disp: 30 g, Rfl: 2  •  ondansetron (ZOFRAN) 8 MG tablet, Take 1  tablet by mouth 3 (Three) Times a Day As Needed for Nausea or Vomiting., Disp: 60 tablet, Rfl: 5  •  prochlorperazine (COMPAZINE) 10 MG tablet, Take 1 tablet by mouth Every 6 (Six) Hours As Needed for Nausea or Vomiting., Disp: 30 tablet, Rfl: 2  •  promethazine (PHENERGAN) 25 MG tablet, Take 1 tablet by mouth Every 6 (Six) Hours As Needed for Nausea or Vomiting., Disp: 60 tablet, Rfl: 2  •  lisinopril (PRINIVIL,ZESTRIL) 10 MG tablet, Take 1 tablet by mouth Daily., Disp: 30 tablet, Rfl: 1  No current facility-administered medications for this visit.    ALLERGIES:   No Known Allergies    SOCIAL HISTORY:       Social History     Tobacco Use   • Smoking status: Current Every Day Smoker     Packs/day: 1.00     Years: 24.00     Pack years: 24.00     Types: Electronic Cigarette, Cigarettes   • Smokeless tobacco: Never Used   Vaping Use   • Vaping Use: Some days   • Substances: Nicotine   • Devices: Disposable   Substance Use Topics   • Alcohol use: Not Currently     Comment: Caffeine use rare   • Drug use: No         FAMILY HISTORY:   Mother has positive factor V Leiden mutation, although she did not have thrombosis, mother also is coronary disease with stenting, she also is occasional bruising.  Maternal grandmother had DVT, she had multiple surgical procedures.  Patient mother's half-brother had metastatic colon cancer at diagnosis in his 50s.  Maternal great aunt has breast cancer.  Patient will follow his skin cancer in his 60s, exclusive.    Review of Systems   Constitutional: Negative for activity change, appetite change, chills, diaphoresis, fatigue, fever and unexpected weight change.        She works full-time in a dental clinic.   HENT: Negative for mouth sores and sore throat.    Eyes: Negative for visual disturbance.   Respiratory: Negative for cough and shortness of breath.    Cardiovascular: Negative for chest pain and leg swelling.   Gastrointestinal: Negative for abdominal distention, abdominal pain, blood  "in stool and nausea.   Endocrine: Negative for cold intolerance.   Genitourinary: Negative for dysuria and hematuria.   Musculoskeletal: Negative for arthralgias and joint swelling.   Skin: Positive for color change (Hands and feet bilaterally). Negative for rash (This has resolved).        See HPI   Allergic/Immunologic: Positive for immunocompromised state (Chemotherapy).   Neurological: Negative for dizziness and light-headedness.   Hematological: Negative for adenopathy. Bruises/bleeds easily (Easy bruising at the site of injection of Lovenox ).   Psychiatric/Behavioral: Negative for agitation and confusion.       08/17/2021 ROS unchanged from above except as updated.       Vitals:    08/17/21 1113   BP: 130/91   Pulse: (!) 136   Resp: 16   Temp: 97.3 °F (36.3 °C)   TempSrc: Temporal   SpO2: 95%   Weight: 84.9 kg (187 lb 1.6 oz)   Height: 166 cm (65.35\")   PainSc: 0-No pain     Current Status 8/3/2021   ECOG score 0     Physical Exam  Vitals reviewed.   Constitutional:       General: She is not in acute distress.     Appearance: Normal appearance. She is well-developed.   HENT:      Head: Normocephalic and atraumatic.   Eyes:      Pupils: Pupils are equal, round, and reactive to light.   Cardiovascular:      Rate and Rhythm: Regular rhythm. Tachycardia present.      Heart sounds: Normal heart sounds. No murmur heard.     Pulmonary:      Effort: Pulmonary effort is normal. No respiratory distress.      Breath sounds: Normal breath sounds. No wheezing.   Abdominal:      General: Bowel sounds are normal. There is no distension.      Palpations: Abdomen is soft.      Tenderness: There is no abdominal tenderness.      Comments: Ostomy in right abdomen.  Scattered bruises on the abdomen bilaterally from Lovenox injections.   Musculoskeletal:      Cervical back: Normal range of motion.      Right lower leg: No edema.      Left lower leg: No edema.   Skin:     General: Skin is warm and dry.      Coloration: Skin is not " jaundiced.      Findings: Bruising present. No rash.   Neurological:      Mental Status: She is alert and oriented to person, place, and time. Mental status is at baseline.   Psychiatric:         Mood and Affect: Mood normal.         Thought Content: Thought content normal.     08/17/2021 physical exam is unchanged from above except as updated.    RECENT LABS:  Results from last 7 days   Lab Units 08/17/21  1048   WBC 10*3/mm3 5.42   NEUTROS ABS 10*3/mm3 3.66   HEMOGLOBIN g/dL 17.0*   HEMATOCRIT % 50.0*   PLATELETS 10*3/mm3 188       Results from last 7 days   Lab Units 08/17/21  1048   SODIUM mmol/L 136   POTASSIUM mmol/L 4.0   CHLORIDE mmol/L 99   CO2 mmol/L 23.4   BUN mg/dL 9   CREATININE mg/dL 0.87   CALCIUM mg/dL 9.5   ALBUMIN g/dL 4.00   BILIRUBIN mg/dL 0.5   ALK PHOS U/L 112   ALT (SGPT) U/L 77*   AST (SGOT) U/L 52*   GLUCOSE mg/dL 144*           Assessment/Plan      ASSESSMENT:   1.  Rectal cancer, rectal ultrasound examination reported T3N0 disease without lymphadenopathy.   · CT scan of chest, abdomen and pelvis reported no lymphadenopathy in the abdomen, pelvis or chest. She does have small pulmonary nodule 6-7 mm and 2 mm with indeterminate feature. There is no solid evidence of metastatic disease otherwise.   · Based on the CT scan and rectal ultrasound imaging studies, this patient had stage IIA (T3N0M0) disease.   · She had PET scan examination on 8/8/2019 which reported a hypermetabolic right upper lobe pulmonary nodule 6 mm with SUV 5.6.  This is suspicious for metastatic disease however it is too small to be biopsied.  This patient may have stage IV disease.   · She initiated concurrent radiation with continuous 5-FU on 8/12/2019.  · Patient finished concurrent chemoradiation therapy.  · Patient underwent surgical resection of the rectal tumor and diverting loop ileostomy on 12/2/2019 with Dr. Ye.  Pathology evaluation reported residual T3 disease, with 1 out of 14 lymph nodes positive for  malignancy.  Certainly this patient has at least stage IIIb rectal cancer (T3 N1 M0?)  · On 1/22/2020 adjuvant chemotherapy FOLFOX 6 regimen initiated.    · On 2/5/2022 cycle #2 was delayed secondary to neutropenia.  She was given 3 days of Granix.   · Emend added with cycle 3.  With improved nausea control  · Continuing home Granix x3 days following 5-FU disconnect  · 3/11/2020 due for cycle 4, however, she is experiencing progressive abdominal pain and occasional fevers.   · CT scan performed on 3/13/2020 reveals no evidence of progressive or recurrent disease.  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  · Patient hospitalized 4/24-4/26/20 after cycle 5 of chemotherapy with acute UTI.  CT abdomen/pelvis noting fluid collection in the presacral region having diminished in size compared to CT dated 3/13/2020.  Patient was evaluated by Dr. Ye while in the hospital with further plans to evaluate possible colovaginal fistula following completion of chemotherapy.  Patient did respond to IV antibiotics and discharged home on oral cefdinir.  · 4/29/2020 cycle 6 of FOLFOX.  Urinary symptoms have resolved   · Patient seen in the [facility] ED on 5/2/2020 for what was suspected to be an allergic reaction.  She called our service on Saturday explaining that she was experiencing hand and face swelling.  She was instructed to proceed to the emergency department at which time she was assessed and prescribed a Medrol Dosepak.  She had just completed her 5-FU and was unhooked on Friday, 5/1/2020.  Her symptoms have resolved in the office on assessment, 5/5/2020.  · The patient had grade 3 hand-foot syndrome based on symptomology.  Patient had cycle #8 of 5-FU on 5/13/2020. Due to her symptoms and poor tolerance to the 5-FU, her treatment dose will be reduced 50% for oxaliplatin and infusional 5-FU.  Bolus 5-FU will be discontinued..  · On 5/13/2020  We discussed further treatment options pending the scan results.  If she has  indeed residual disease or metastatic disease, we will switch her to irinotecan plus Avastin or anti-EGFR monoclonal antibody.  She will need a further molecular testing of her tumor samples in that case.  · On 5/21/2020 patient had a PET scan and it showed no evidence of of metastatic disease in the neck, chest, abdomen or pelvis.  There was fluid accumulation/abscess.   · On 5/27/2020 I discussed with the patient that we can discontinue chemotherapy at this time.  She will follow-up with Dr. Ye to discuss a possibility to reverse the ileostomy.  We likely will obtain anther PET scan in 3 to 4 months for reassessment.    · Patient was seen by Dr. Ye on 6/19/2019 for evaluation and to discuss possible take down of her ileostomy.  She is scheduled to have a gastrografin enema on 7/2/2020 to evaluate for a fistula, and then a colonoscopy on 7/15/2020, both done by Dr. Ye.  She states that based on the above imaging and procedure findings, she may have a more extensive surgery done or just have her ileostomy reversed, which would likely be done in August 2020.  This will all be coordinated under the care of Dr. Ye.     · I reviewed scan results with the patient on 9/16/2020 for the most recent CT scan from 09/09/2020 and also compared to her previous PET scan examination from 05/21/2020 and also the original PET scan from 08/09/2019.  The original PET scan there is a small right upper lobe pulmonary nodule 6 mm with SUV 5.6. So in the 05/2020 PET scan the nodule is still there but activity seems much less and this most recent CT scan examination also documented the preexisting small pulmonary nodule however there is a new left lower lobe pulmonary nodule 8 mm in size and I shared with the patient today this nodule is suspicious for metastatic disease. Laboratory studies reported minimal CEA level 1.32 on 09/09/2020. Liver function panel was only marginally abnormal with the ALT 45, the remaining is normal.  However laboratory study today showed worsening AST 55, ALT 95 and alkaline phosphatase 143 but is still normal, total bilirubin 0.3.   · So I discussed with the patient on 9/16/2020 recommended to have repeat PET scan examination for assessment because the left lower lobe pulmonary nodule is too small to be biopsied, and if the PET scan reports hypermetabolic lesion, I recommended to have stereotactic radiation therapy. Explained to patient that she is not a really good candidate to have thoracotomy for resection because she still has unhealed wound in the abdomen. I think the stereotactic radiation therapy will be a more feasible choice.  · Laboratory study on 9/16/2020 reported worsening liver function panel with both elevated AST, ALT and also slightly worsened alkaline phosphatase despite having normal total bilirubin. I recommended to repeat laboratory study for reevaluation. The only new medication she has in the past several days is Augmentin but otherwise no change of condition. She denies any recent viral infection. We will monitor this.   · PET scan examination obtained on 9/18/2020 showed metastatic disease.  It reported a few hypermetabolic pulmonary nodules, and some new small pulmonary nodules, all of them highly suspicious for metastatic disease.  She also has hypermetabolic activity in the abdomen and pelvic area which could be related to scar tissue from her recent surgery versus metastatic disease.  Nevertheless overall picture fits with metastatic cancer.  · Discussed with the patient on 9/20/2020, we reviewed the PET scan images together, and I recommended to have systematic chemotherapy, I do not think stereotactic reading therapy to the hypermetabolic lesions in the lungs warranted at this time because even those will be treated with reading therapy, she will still need systematic chemotherapy.  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  I did discuss  with the patient using anti-EGFR monoclonal antibody versus anti-VEGF monoclonal antibody.     · Palliative chemotherapy cycle#1 FOLFIRI was started on 10/13/2020.  No bolus 5-FU given considering previous poor tolerance.    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  Microsatellite stable, mutation burden 5/Mb.   · Discussed with the patient 10/27/2020, because of the [place] mutation, she is not a candidate for anti-EGFR monoclonal antibody such as Erbitux or vectibix.  She could be a candidate for anti-VEGF monoclonal antibody, however because of active wound, she is not a candidate right now at this moment.  · Patient seen in the ED for acute right neck pain on 11/16/2020.  CT angiogram as well as CT of the cervical spine performed with no acute findings but notably one of her pulmonary nodules, left upper lobe, somewhat decreased in size.  Patient given prednisone pack.  Further details below.  · Patient due today for cycle #4 FOLFIRI.  Plan repeat PET scan after cycle 6.  · Last received cycle #5 chemotherapy without irinotecan on 12/8/2020.   · Patient here 12/29/2020 for evaluation and consideration of cycle 6, but she continues to complain of swelling.  She does have swelling typically after treatment as well.  We will hold treatment for 1 week and reevaluate next week.  Still planning on PET scan following cycle 6.   · Cycle #6 chemotherapy will be resumed on 1/5/2021.  Started back on original irinotecan.  · PET scan examination obtained on 1/12/2021 after cycle #6 chemotherapy reported improved pulmonary nodule hypermetabolic activity.  Confirmed these are metastatic lesions.  · Patient is evaluated 1/19/2020, will continue cycle #7 FOLFIRI chemotherapy.  However Avastin will be on hold see next section.   · Patient was reevaluated on 2/2/2021, will continue chemotherapy cycle #8 FOLFIRI.  Avastin / biosimilar will be added.    · She talked to [facility] specialist in mid February 2021, and had recommended palliative chemotherapy without surgical intervention of metastatic lung lesions.   · 2/16/2021cycle #9 FOLFIRI plus bevacizumab.  Tolerated last cycle well with only some mild increase in liquid stools.  This was easily controlled with antidiarrheals.  · On 3/2/2021, patient will proceed with cycle #10 FOLFIRI plus bevacizumab.  After 12 cycles, plan to start maintenance treatment.  · 3/16/2021 cycle 11.  Plus bevacizumab.  · 3/30/2021 cycle 12 FOLFIRI plus bevacizumab.  Having issues with worsening nausea despite premeds with dexamethasone, Aloxi, Emend, and Zofran at home.  Patient is requesting a dose of Phenergan, which is helped her in the past.  We will give this to her with treatment today.  · PET scan examination on 4/6/2021 reported no evidence of hypermetabolic metastatic lesion.  · Discussed with the patient on 4/13/2021, we reviewed the PET scan results.  We recommended to switch to maintenance chemotherapy without irinotecan.  We will continue 5-FU and Avastin every 2 weeks.  We discussed possibility of switching to oral Xeloda treatment.  Discussed with the patient for side effects more so with hand-foot syndrome.  Patient is agreeable.   · Xeloda 2000 mg twice daily 7 days on, 7 days off along with Avastin initiated 4/27/2021.  · After only 5 doses of Xeloda significant hand-foot syndrome, Xeloda held.  Dose adjustment versus transition to 5-FU will be further discussed with Dr. Nguyen  · Patient returns 5/11/2021 for reevaluation.  She did take her first dose of Xeloda 1500 mg this morning, but is actually requesting to be transition back to infusional 5-FU, as she felt that she tolerated infusional 5-FU without any problem.  The patient will receive 5-FU leucovorin and Avastin every 2 weeks.  Xeloda will be discontinued.  · 5/25/2021 continued cycle #4 maintenance 5-FU, leucovorin, Avastin tolerating this much better so far, we will  continue this.  · On 6/8/2021, will proceed ahead with cycle #5 maintenance chemotherapy with infusional 5-FU, leucovorin and Avastin.  · 6/22/2021 continues on maintenance chemotherapy with infusional 5-FU, leucovorin, Avastin tolerating well.  · 7/6/2021, proceed with maintenance chemotherapy cycle #7.  I discussed with the patient, recommended to have 12 cycles of maintenance chemotherapy, then obtain PET scan for reevaluation.  We could discuss further treatment plan at that time.  · 8/3/2021 continues with maintenance chemotherapy with 5-FU, leucovorin, Avastin tolerating well.  This will be her ninth cycle.    · On 8/17/2021, patient will proceed ahead with cycle #10 maintenance chemotherapy.  Plans to repeat a PET scan after a total of 12 cycles maintenance.   ·     2 .  Pulmonary nodule, hypermetabolic on PET scan.   · Her original PET scan examination from 8/8/2019 reported a small 8 mm right upper apex pulmonary nodule but hypermetabolic SUV 5.1.  That was too small to be biopsied, however there was always a possibility of metastatic disease considering that high activity despite a such a small nodule.  · Her chest CT scan examination from 3/13/2020 reported this shrank to 6 mm.    · PET scan examination on 5/21/2020 reported no metabolic activity at this residual nodule.  This needs to be monitored.    · PET scan examination 9/18/2020 reported couple of hypermetabolic pulmonary nodules, besides a few extra small pulmonary nodules.  Those are highly suspicious for metastatic disease.    · PET scan examination obtained on 1/12/2021 after cycle #6 chemotherapy reported improved a pulmonary nodule hypermetabolic activity.  Confirmed these are metastatic lesions.  · 1/19/2021, patient reports she will see thoracic surgeon tomorrow at [facility] where she seeks second opinion evaluation, and to see whether she would be a candidate for resection of pulmonary nodules.  I discussed with the patient,  she had at least 2 hypermetabolic lesions although they responded to chemotherapy, I do not think she would be a candidate for surgery.   · Thoracic surgeon from [facility] recommended metastectomy and to discontinue chemotherapy afterwards.   · Patient had a third opinion evaluation on 2/11/2021 by telemedicine with [facility] physician, who recommended palliative chemotherapy with no surgical intervention for metastatic pulmonary lesions.  Patient was agreeable with this strategy.      3.   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  Low-dose Lovenox discontinued and initiated Xarelto 20 mg daily.  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  · Patient held Xarelto for 2 days prior to MediPort removal from the left chest wall and placement of new port in the right chest wall on 12/18/2020.  She resumed Xarelto that evening.  · Progressive swelling in the neck, face, upper extremities prompting hospitalization with CT scan showing thrombosis in the right subclavian vein and SVC.  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  Although she had been off of Xarelto for a few days, clearly Xarelto was not prohibiting progression of her thrombosis after she resumed treatment and there was some evidence to suggest thrombosis had been present at least in the innominate vein on prior scan in September but now appears  chronic.  Current acute thrombosis involving SVC and right subclavian.  · Patient was admitted and placed on heparin drip  · Patient was evaluated by vascular surgery and intervention was not recommended for thrombolysis/thrombectomy.  · On 12/22/2020, the patient developed worsening shortness of breath, increasing upper extremity edema consistent with worsening SVC syndrome.  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  · Symptoms improved and patient was discharged 12/24/2020 on Lovenox 100 mg every 12 hours.    · Returns 12/29/2020 for evaluation continuing Lovenox, overall tolerating it well.  No bleeding issues.  · Improved edema 1/5/2021.  Will continue Lovenox weight-based every 12 hours.  · On 1/19/2021 and 2/2/2021, patient reports improved facial swelling.  She however does have being bruise on her abdomen wall where she does Lovenox injection.  However she does not have a spontaneous epistaxis, gum bleeding or other bleeding.   · On 2/2/2021, we discussed that side effects of Avastin/biosimilar related to thrombosis.  Since this patient already has been on Lovenox, I think the benefits from treatment for her cancer will outweigh that risk of thrombosis, will proceed ahead with Avastin.  I cautioned patient to watch out for signs of worsening thrombosis patient voiced understanding.   · On 3/16/2021, no evidence of worsening DVT, continue Lovenox.  · 5/11/2021 Lovenox dosing will be adjusted due to patient's weight loss.  We discussed she needs 1 mg/kg.  Her syringes are 100 mg syringes she will do 0.9 mL subcu every 12 hours.  · 8/3/2021 Patient continues to have bruising on abdomen from lovenox injections.  Will need to monitor weight and adjust dosing in future if further weight loss.   · Stable condition on 8/17/2021.  Continue to monitor.    4.  This patient also has strong family  history for malignancy the patient had appointment with genetic counseling on September 3, 2019.  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    5.  Mild anemia, improved since her surgery.    · She also has a history of iron deficiency.  Iron studies reveal a saturation of just 10%.  She was started on oral iron but was unable to tolerate due to GI side effects.   · Status post Injectafer 2/5/2020 and 2/12/2020   · Improved hemoglobin 13.5 on 1/5/2021.  · Hemoglobin 14.7 on 2/16/2021.    · Hemoglobin 16.2 on 3/2/2021.    · 3/16/2020 when hemoglobin now up to 16.2, hematocrit 47.6.  Patient admits that she has started smoking again, and I have encouraged her to quit.  She denies any snoring or sleep apnea diagnosis.  Patient is not taking oral iron.  We will closely monitor.  · 3/30/2020 hemoglobin 15.7, HCT 47.5.  Patient states that she has cut back significantly on her smoking, although not quit yet.  I have encouraged her to continue working on this.  We will continue to closely monitor.  · Hemoglobin 14.6 on 6/8/2021 at the meantime he has worsening macrocytosis .6.    · Laboratory studies 6/22/2021 reported excellent folate more than 20 ng/mL, however low normal B12 level 357 pg/mL.    · On 7/6/2021, normal hemoglobin 15.8, .9 and HCT 47.2%.  Patient was asked to start oral vitamin B12 at 1000 mcg daily.   · 7/20/2021 hemoglobin remains normal at 15.8.   · 8/17/2021 hemoglobin 17.0 hematocrit 50.0%.     6.  Peripheral neuropathy secondary to chemotherapy.   · This is mild involving bilateral hands and feet as reported on 9/16/2020.   · Will avoid chemotherapy with oxaliplatin.  · Stable mild neuropathy as of 11/10/2020  · Patient reports worsening peripheral neuropathy and cycle #5 chemotherapy on 12/8/2020 with and without irinotecan.   · On 1/5/2021, patient reports improvement of peripheral neuropathy, will add irinotecan back to chemotherapy.  Continue to monitor and adjust medication.  · On  3/2/2021, patient reports no worsening of peripheral neuropathy after restarting irinotecan.   · This remains stable as of 8/17/2021.     7.  History of hand-foot syndrome grade 3.    · It become so significant after cycle #7 FOLFOX treatment.  Discussed with patient on 5/13/2020, will discontinue the bolus 5-FU dose, and also decrease 50% of the infusion dose through the pump.   · We also use Medrol Dosepak for possible recurrent symptomology.  She responded this well.   · Her hand-foot syndrome improved.  · Under current treatment with FOLFIRI, patient not receiving 5-FU bolus.  No recurrent issues.   · Patient will be switched to maintenance chemotherapy using Xeloda in late April 2021.  · Following 5 doses of Xeloda, significant hand-foot syndrome with pain in the feet, significant erythema.  Xeloda held.  Will be further discussed with Dr. Nguyen to determine if the patient will resume 5-FU versus a dose reduction to Xeloda.  · Aggressive emollient moisturizer use twice daily for the past week and patient reports improvement in the dryness and erythema.  Xeloda will now be discontinued and patient will switch back to 5-FU.  · As of 5/25/2021 dryness and erythema/hyperpigmentation continues to improve.  Xeloda has been discontinued.  · 6/8/2021, patient reports much improved hand-foot syndrome, with remnant pigmentation on palms.  · On 7/6/2021, patient reports and had a some flareup of hand-foot syndrome, however improved after aggressive moisturizing cream and the urea cream.  Overall much tolerated infusional 5-FU compared to Xeloda.    · 7/20/2021 continues to have mild hyperpigmentation of her hands and feet bilaterally, she continues aggressive moisturization with utter balm with urea.  She also reports some soreness of her tailbone, and we discussed that this is likely due to loss of weight and muscle mass.  I advised her to go ahead and apply moisturizer to this area as there is one tiny fissure.  Also  recommended using a waffle pad or doughnut to sit on.  · 8/3/2021 patient reports her hand foot symptoms are stable and without worsening.  Continue to monitor   · No specific complaints today on 8/17/2021.    8.  Hyperlacrimation and mild scleral irritation related to 5-FU.  She has been taking steroid eyedrops.  This has improved her hyperlacrimation.  · Not currently an issue.  Continue to monitor with 5-FU.      9.  Abnormal liver function panel.  · Improved liver function panel 9/18/2020.  This is probably related to her recent infection.  · She has normalized AST, alk phosphatase, and a much improved only marginally elevated ALT 35 on 10/13/2020.    · Normalized liver function panel on 10/27/2020.    · Normal liver panel on 11/10/2020.    · Normal liver function panel on 12/5/2021.    · Slightly worsening liver function with AST 33 and ALT 56 on 1/19/2021.    · Improved AST 21 and ALT 39 on 2/2/2021.    · ALT 59 and AST 29 on 3/2/2021.    · ALT 56, AST 24, alk phosphatase 121 and total bilirubin 0.3 on 4/13/2021.    · 6/8/2021, only marginally elevated ALT 48 otherwise normal liver function panel.    · On 7/6/2021, ALT 42, normal AST ALT and total bilirubin.  Continue to monitor.   · 8/3/2021 AST 30, ALT 51, t bili 0.2   · 8/17/2021, worsening liver function panel, with ALT 77, AST 52 normal total bilirubin 0.5 and a normal alk phos 112.  Continue to monitor.    10.  Intermittent leukocytopenia secondary to chemotherapy.   · WBC currently normal at 5220 and the neutrophil 3520 on 1/5/2021.  Continue to monitor.    · On 2/2/2021, WBC 4110 including ANC 2540.  Continue to monitor for now.  Consider G-CSF if neutropenia becomes an ongoing issue.   · 2/16/2021 WBC 4.6, ANC 2.79.    · 3/16/2021 WBC 3.93, ANC 2.26, continue to monitor.  · On 4/13/2021 WBC 4250 including ANC 2640.  · 5/25/2021 WBC 4.21 ANC 2.35.    · 6/22/2021 WBC stable at 6.07, ANC 4.33.  · 7/6/2021 WBC 5120 and ANC 3450.  Continue to  monitor.  · 8/3/2021 WBC 4.99, ANC 3010   · 8/17/2021, WBC 5220, including ANC 3660 lymphocytes 1050.     11.  Depression.  Patient seen by JULIET Davenport on 11/9/2020.  She was started on mirtazapine.  Lexapro was discontinued.  This has definitely improved her mood with the patient feeling overall much better.  She is sleeping better.  Appetite is improved with her actually eating more than she wants to.    · Condition is stable.  She plans to continue follow-up with supportive oncology.    12.  Intermittent upper abdominal discomfort.  This improves with eating.  After further discussion with the patient she also notes 3 nights of increased reflux when laying down.  We discussed her symptoms could be related to increase stomach acid or potential ulcer.  She is currently taking Pepcid 20 mg daily.  I instructed her to add Prilosec 20 mg daily.   · On 3/16/2021 patient reports some worsening reflux symptoms last week.  She has increased Pepcid to 20 mg twice daily, which did resolve her symptoms.  We discussed she could add Prilosec 20 mg daily as well.   · No complaint today.  Continue to monitor.    13.  Proteinuria: 3/30/2020: Patient is 2+ protein in her urine.  We will continue with Avastin and closely monitor.  No need for 24-hour urine at this time.  · Persistent 2+ proteinuria on 4/13/2021.  continue to monitor.  · 5/25/2021 protein urine only 1+.    · 6/22/2021 persistent 1+ proteinuria.  Continue to monitor.  · 7/6/2021, trace protein.  Continue Avastin treatment and monitoring.  · 8/3/2021 1+ protein, stable from last cycle.   · On 8/17/2021, urine protein 2+.  She also had a 1+ leukocytes.  Patient is not symptomatic such as fever, dysuria or gross hematuria, however I think we should will obtain urine culture.  This patient is immunosuppressed.    14.  Tachycardia:   · Patient was seen by Dr. Bustos cardiologist on 4/6/2021.   · Follow up visit 7/22/2021 with plans to repeat evaluation in 3  months    15.  Hypertension.  · /91 today 8/17/2021.  This is likely secondary to Avastin.  · We will start the patient on low-dose lisinopril 10 mg daily.       PLAN:  1. Proceed with cycle #10 maintenance chemotherapy with infusional 5-FU, leucovorin, and Avastin today.    2. Patient will be given 1 dose of clonidine 0.1 mg today in the clinic.  3. Start lisinopril 10 mg daily.  I E-scribed to her pharmacy.  4. Continue aggressive moisturizing cessation to hands and feet.   5. Obtain urine culture today.   6. Continue Lovenox 1 mg/kg twice daily.  7. Continue antiemetics if needed for nausea.  8. Continue oral B12 at 1000 mcg daily.  9. Return in 2 weeks in 4 weeks for follow-up visit with nurse practitioner, prior to her cycle #11 anasarca #12 maintenance chemotherapy infusional 5-FU, leucovorin, Avastin.  10. Arrange PET scan examination in 5 weeks, 1 week after cycle #12 chemotherapy to assess her condition.   At that time we could consider give her a short chemotherapy holiday, or change maintenance chemotherapy to every 3 weeks or even every 4 weeks.    11. Patient will come back to see me in 6 weeks, to discuss the results of PET scan and further treatment plan.  12. Call/return sooner should she palpate new concerns or problems.    Hypertension is a new problem today.  She currently is not on medication.  We will start the patient on lisinopril.    Discussed with the patient about the treatment plan.  She voiced understanding and agreeable.    I also spoke with her chemotherapy nurse today.    Patient on high risk medication requiring close monitoring for toxicity.    Dong Nguyen MD PhD  08/17/21      CC:  WAYNE Worley MD Carrie B. Scharf, MD

## 2021-08-19 ENCOUNTER — INFUSION (OUTPATIENT)
Dept: ONCOLOGY | Facility: HOSPITAL | Age: 41
End: 2021-08-19

## 2021-08-19 DIAGNOSIS — Z45.2 ENCOUNTER FOR FITTING AND ADJUSTMENT OF VASCULAR CATHETER: Primary | ICD-10-CM

## 2021-08-19 DIAGNOSIS — N39.0 URINARY TRACT INFECTION WITHOUT HEMATURIA, SITE UNSPECIFIED: Primary | ICD-10-CM

## 2021-08-19 LAB — BACTERIA SPEC AEROBE CULT: ABNORMAL

## 2021-08-19 PROCEDURE — 25010000002 HEPARIN LOCK FLUSH PER 10 UNITS: Performed by: INTERNAL MEDICINE

## 2021-08-19 RX ORDER — LEVOFLOXACIN 500 MG/1
500 TABLET, FILM COATED ORAL DAILY
Qty: 5 TABLET | Refills: 0 | Status: SHIPPED | OUTPATIENT
Start: 2021-08-19 | End: 2021-08-24

## 2021-08-19 RX ORDER — SODIUM CHLORIDE 0.9 % (FLUSH) 0.9 %
10 SYRINGE (ML) INJECTION AS NEEDED
Status: DISCONTINUED | OUTPATIENT
Start: 2021-08-19 | End: 2021-08-19 | Stop reason: HOSPADM

## 2021-08-19 RX ORDER — HEPARIN SODIUM (PORCINE) LOCK FLUSH IV SOLN 100 UNIT/ML 100 UNIT/ML
500 SOLUTION INTRAVENOUS AS NEEDED
Status: CANCELLED | OUTPATIENT
Start: 2021-08-19

## 2021-08-19 RX ORDER — SODIUM CHLORIDE 0.9 % (FLUSH) 0.9 %
10 SYRINGE (ML) INJECTION AS NEEDED
Status: CANCELLED | OUTPATIENT
Start: 2021-08-19

## 2021-08-19 RX ORDER — HEPARIN SODIUM (PORCINE) LOCK FLUSH IV SOLN 100 UNIT/ML 100 UNIT/ML
500 SOLUTION INTRAVENOUS AS NEEDED
Status: DISCONTINUED | OUTPATIENT
Start: 2021-08-19 | End: 2021-08-19 | Stop reason: HOSPADM

## 2021-08-19 RX ADMIN — SODIUM CHLORIDE, PRESERVATIVE FREE 10 ML: 5 INJECTION INTRAVENOUS at 13:49

## 2021-08-19 RX ADMIN — Medication 500 UNITS: at 13:49

## 2021-08-19 NOTE — TELEPHONE ENCOUNTER
Call to Ms. Weaver to let her know that urine culture had come back positive, and Dr. Nguyen is prescribing Levaquin 500 mg daily for 5 days and it will be e-scribed to her [facility].  Patient verbalized understanding and thanks.

## 2021-08-22 DIAGNOSIS — F33.9 MONOPOLAR DEPRESSION (HCC): ICD-10-CM

## 2021-08-23 RX ORDER — FAMOTIDINE 20 MG/1
TABLET, FILM COATED ORAL
Qty: 180 TABLET | Refills: 1 | Status: SHIPPED | OUTPATIENT
Start: 2021-08-23 | End: 2021-12-30

## 2021-08-23 RX ORDER — ESCITALOPRAM OXALATE 10 MG/1
TABLET ORAL
Qty: 30 TABLET | Refills: 5 | Status: SHIPPED | OUTPATIENT
Start: 2021-08-23 | End: 2021-12-21 | Stop reason: DRUGHIGH

## 2021-08-23 NOTE — TELEPHONE ENCOUNTER
Famotidine refill request rec electronically from [facility]. Per last office note from dr Nguyen-pt increased to 20 mg and symptoms resolved.    · She has increased Pepcid to 20 mg twice daily, which did resolve her symptoms.     I have routed the rx to Katiuska LEVI NP as Dr Nguyen is out of the office. Once signed it will be escribed to [facility].

## 2021-08-24 ENCOUNTER — TELEPHONE (OUTPATIENT)
Dept: ONCOLOGY | Facility: CLINIC | Age: 41
End: 2021-08-24

## 2021-08-24 NOTE — TELEPHONE ENCOUNTER
PT CALLING C/O NAUSEA, VOMITING AND FEELING LIGHTHEADED WHEN SHE STANDS. PT'S BP HAS BEEN RUNNING LOW AT HOME. PT WAS JUST STARTED ON LISINOPRIL 10MG DAILY AND LEVAQUIN FOR UTI LAST THURS. PER [facility] NP LEVAQUIN LIKELY CAUSING THE N&V. PT ALREADY TOOK HER LAST DOSE SO HOPEFULLY THIS WILL IMPROVE. PT TO DRINK EX FLUIDS AND TAKE ANTIEMETICS AS ORDERED. PT TO ALSO CUT LISINOPRIL IN HALF AND JUST TAKE 5MG DAILY. PT REPORTS TAB IS SCORED SO SHE CAN CUT IN HALF. PT WILL CONTINUE TO MONITOR HER PRESSURE AND SEE IF THE REDUCED DOSE WILL HELP. PT TO CALL BACK IN COUPLE DAYS IF NOT IMPROVING. PT V/U TO ALL.

## 2021-08-24 NOTE — TELEPHONE ENCOUNTER
Dr. Nguyen put pt on BP medicine last week.  She had a terrible weekend, vomited all day yesterday.  Said she had never felt this back the whole time she's been on chemo.

## 2021-08-31 ENCOUNTER — OFFICE VISIT (OUTPATIENT)
Dept: ONCOLOGY | Facility: CLINIC | Age: 41
End: 2021-08-31

## 2021-08-31 ENCOUNTER — INFUSION (OUTPATIENT)
Dept: ONCOLOGY | Facility: HOSPITAL | Age: 41
End: 2021-08-31

## 2021-08-31 VITALS
HEART RATE: 107 BPM | RESPIRATION RATE: 16 BRPM | DIASTOLIC BLOOD PRESSURE: 83 MMHG | HEIGHT: 65 IN | TEMPERATURE: 97.1 F | BODY MASS INDEX: 31.72 KG/M2 | OXYGEN SATURATION: 97 % | WEIGHT: 190.4 LBS | SYSTOLIC BLOOD PRESSURE: 138 MMHG

## 2021-08-31 DIAGNOSIS — T14.8XXA OPEN WOUND OF SKIN: ICD-10-CM

## 2021-08-31 DIAGNOSIS — Z79.01 CHRONIC ANTICOAGULATION: Chronic | ICD-10-CM

## 2021-08-31 DIAGNOSIS — C20 ADENOCARCINOMA OF RECTUM (HCC): Primary | ICD-10-CM

## 2021-08-31 DIAGNOSIS — C18.9 MALIGNANT NEOPLASM OF COLON, UNSPECIFIED PART OF COLON (HCC): Primary | Chronic | ICD-10-CM

## 2021-08-31 DIAGNOSIS — Z79.899 HIGH RISK MEDICATION USE: ICD-10-CM

## 2021-08-31 DIAGNOSIS — I15.8 OTHER SECONDARY HYPERTENSION: ICD-10-CM

## 2021-08-31 LAB
ALBUMIN SERPL-MCNC: 3.6 G/DL (ref 3.5–5.2)
ALBUMIN/GLOB SERPL: 1.3 G/DL (ref 1.1–2.4)
ALP SERPL-CCNC: 98 U/L (ref 38–116)
ALT SERPL W P-5'-P-CCNC: 52 U/L (ref 0–33)
ANION GAP SERPL CALCULATED.3IONS-SCNC: 9.3 MMOL/L (ref 5–15)
AST SERPL-CCNC: 26 U/L (ref 0–32)
BASOPHILS # BLD AUTO: 0.02 10*3/MM3 (ref 0–0.2)
BASOPHILS NFR BLD AUTO: 0.3 % (ref 0–1.5)
BILIRUB SERPL-MCNC: 0.3 MG/DL (ref 0.2–1.2)
BILIRUB UR QL STRIP: NEGATIVE
BUN SERPL-MCNC: 9 MG/DL (ref 6–20)
BUN/CREAT SERPL: 11.5 (ref 7.3–30)
CALCIUM SPEC-SCNC: 9.1 MG/DL (ref 8.5–10.2)
CHLORIDE SERPL-SCNC: 103 MMOL/L (ref 98–107)
CLARITY UR: CLEAR
CO2 SERPL-SCNC: 23.7 MMOL/L (ref 22–29)
COLOR UR: YELLOW
CREAT SERPL-MCNC: 0.78 MG/DL (ref 0.6–1.1)
DEPRECATED RDW RBC AUTO: 54.6 FL (ref 37–54)
EOSINOPHIL # BLD AUTO: 0.23 10*3/MM3 (ref 0–0.4)
EOSINOPHIL NFR BLD AUTO: 3.7 % (ref 0.3–6.2)
ERYTHROCYTE [DISTWIDTH] IN BLOOD BY AUTOMATED COUNT: 14.2 % (ref 12.3–15.4)
GFR SERPL CREATININE-BSD FRML MDRD: 81 ML/MIN/1.73
GLOBULIN UR ELPH-MCNC: 2.7 GM/DL (ref 1.8–3.5)
GLUCOSE SERPL-MCNC: 120 MG/DL (ref 74–124)
GLUCOSE UR STRIP-MCNC: NEGATIVE MG/DL
HCT VFR BLD AUTO: 45.2 % (ref 34–46.6)
HGB BLD-MCNC: 15 G/DL (ref 12–15.9)
HGB UR QL STRIP.AUTO: NEGATIVE
IMM GRANULOCYTES # BLD AUTO: 0.01 10*3/MM3 (ref 0–0.05)
IMM GRANULOCYTES NFR BLD AUTO: 0.2 % (ref 0–0.5)
KETONES UR QL STRIP: NEGATIVE
LEUKOCYTE ESTERASE UR QL STRIP.AUTO: ABNORMAL
LYMPHOCYTES # BLD AUTO: 1.23 10*3/MM3 (ref 0.7–3.1)
LYMPHOCYTES NFR BLD AUTO: 20 % (ref 19.6–45.3)
MCH RBC QN AUTO: 34.7 PG (ref 26.6–33)
MCHC RBC AUTO-ENTMCNC: 33.2 G/DL (ref 31.5–35.7)
MCV RBC AUTO: 104.6 FL (ref 79–97)
MONOCYTES # BLD AUTO: 0.48 10*3/MM3 (ref 0.1–0.9)
MONOCYTES NFR BLD AUTO: 7.8 % (ref 5–12)
NEUTROPHILS NFR BLD AUTO: 4.19 10*3/MM3 (ref 1.7–7)
NEUTROPHILS NFR BLD AUTO: 68 % (ref 42.7–76)
NITRITE UR QL STRIP: NEGATIVE
NRBC BLD AUTO-RTO: 0 /100 WBC (ref 0–0.2)
PH UR STRIP.AUTO: 5.5 [PH] (ref 4.5–8)
PLATELET # BLD AUTO: 150 10*3/MM3 (ref 140–450)
PMV BLD AUTO: 9.3 FL (ref 6–12)
POTASSIUM SERPL-SCNC: 4.5 MMOL/L (ref 3.5–4.7)
PROT SERPL-MCNC: 6.3 G/DL (ref 6.3–8)
PROT UR QL STRIP: NEGATIVE
RBC # BLD AUTO: 4.32 10*6/MM3 (ref 3.77–5.28)
SODIUM SERPL-SCNC: 136 MMOL/L (ref 134–145)
SP GR UR STRIP: 1.02 (ref 1–1.03)
UROBILINOGEN UR QL STRIP: ABNORMAL
WBC # BLD AUTO: 6.16 10*3/MM3 (ref 3.4–10.8)

## 2021-08-31 PROCEDURE — 25010000002 PALONOSETRON PER 25 MCG: Performed by: NURSE PRACTITIONER

## 2021-08-31 PROCEDURE — 25010000002 BEVACIZUMAB PER 10 MG: Performed by: NURSE PRACTITIONER

## 2021-08-31 PROCEDURE — 96367 TX/PROPH/DG ADDL SEQ IV INF: CPT

## 2021-08-31 PROCEDURE — 25010000002 FLUOROURACIL PER 500 MG: Performed by: NURSE PRACTITIONER

## 2021-08-31 PROCEDURE — 96416 CHEMO PROLONG INFUSE W/PUMP: CPT

## 2021-08-31 PROCEDURE — 25010000002 DEXAMETHASONE SODIUM PHOSPHATE 100 MG/10ML SOLUTION: Performed by: NURSE PRACTITIONER

## 2021-08-31 PROCEDURE — 96375 TX/PRO/DX INJ NEW DRUG ADDON: CPT

## 2021-08-31 PROCEDURE — 25010000002 LEUCOVORIN CALCIUM PER 50 MG: Performed by: NURSE PRACTITIONER

## 2021-08-31 PROCEDURE — 96413 CHEMO IV INFUSION 1 HR: CPT

## 2021-08-31 PROCEDURE — 25010000002 FOSAPREPITANT PER 1 MG: Performed by: NURSE PRACTITIONER

## 2021-08-31 PROCEDURE — 81003 URINALYSIS AUTO W/O SCOPE: CPT

## 2021-08-31 PROCEDURE — 85025 COMPLETE CBC W/AUTO DIFF WBC: CPT

## 2021-08-31 PROCEDURE — 25010000002 BEVACIZUMAB 100 MG/4ML SOLUTION 4 ML VIAL: Performed by: NURSE PRACTITIONER

## 2021-08-31 PROCEDURE — 80053 COMPREHEN METABOLIC PANEL: CPT

## 2021-08-31 PROCEDURE — 99215 OFFICE O/P EST HI 40 MIN: CPT | Performed by: NURSE PRACTITIONER

## 2021-08-31 RX ORDER — PALONOSETRON 0.05 MG/ML
0.25 INJECTION, SOLUTION INTRAVENOUS ONCE
Status: CANCELLED | OUTPATIENT
Start: 2021-08-31

## 2021-08-31 RX ORDER — SODIUM CHLORIDE 9 MG/ML
250 INJECTION, SOLUTION INTRAVENOUS ONCE
Status: CANCELLED | OUTPATIENT
Start: 2021-08-31

## 2021-08-31 RX ORDER — PALONOSETRON 0.05 MG/ML
0.25 INJECTION, SOLUTION INTRAVENOUS ONCE
Status: COMPLETED | OUTPATIENT
Start: 2021-08-31 | End: 2021-08-31

## 2021-08-31 RX ORDER — SODIUM CHLORIDE 9 MG/ML
250 INJECTION, SOLUTION INTRAVENOUS ONCE
Status: COMPLETED | OUTPATIENT
Start: 2021-08-31 | End: 2021-08-31

## 2021-08-31 RX ADMIN — DEXAMETHASONE SODIUM PHOSPHATE 12 MG: 10 INJECTION, SOLUTION INTRAMUSCULAR; INTRAVENOUS at 10:57

## 2021-08-31 RX ADMIN — SODIUM CHLORIDE 100 ML: 9 INJECTION, SOLUTION INTRAVENOUS at 10:25

## 2021-08-31 RX ADMIN — PALONOSETRON 0.25 MG: 0.05 INJECTION, SOLUTION INTRAVENOUS at 10:24

## 2021-08-31 RX ADMIN — LEUCOVORIN CALCIUM 810 MG: 350 INJECTION, POWDER, LYOPHILIZED, FOR SOLUTION INTRAMUSCULAR; INTRAVENOUS at 11:53

## 2021-08-31 RX ADMIN — BEVACIZUMAB 900 MG: 400 INJECTION, SOLUTION INTRAVENOUS at 11:18

## 2021-08-31 RX ADMIN — FLUOROURACIL 4850 MG: 50 INJECTION, SOLUTION INTRAVENOUS at 12:28

## 2021-08-31 RX ADMIN — SODIUM CHLORIDE 250 ML: 9 INJECTION, SOLUTION INTRAVENOUS at 10:20

## 2021-08-31 NOTE — PROGRESS NOTES
REASON FOR FOLLOW UP:     1. Rectal cancer, rectal ultrasound examination in July 2019 reported T3N0 disease without lymphadenopathy. She does have small pulmonary nodule 6-7 mm and 2 mm with indeterminate feature. There is no solid evidence of metastatic disease otherwise. Patient has stage IIA (T3N0M0) disease.  2. The patient is heterozygous factor V Leiden, was on prophylactic anticoagulation with Lovenox 40 mg daily given her increased risk of thrombosis with MediPort and GI malignancy.   3. PET scan on 8/8/2019 reported an 8 mm hypermetabolic right upper lobe pulmonary nodule, which is suspicious for metastatic as well.    4. Patient had a PowerPort placement on the left upper chest by Dr. Joseph on 8/9/2019.  5. Patient was started on concurrent infusional 5-FU chemoradiation therapy on 8/12/2019, with planned complete surgical resection and further adjuvant chemotherapy with intention to cure the disease.   6. Patient underwent surgical resection of the primary rectal cancer by Dr. Ye on 12/2/2019.  Pathology evaluation reported residual T3N1 disease stage IIIb.  7. Diarrhea related to therapy and radiation.   8. Patient was started cycle 1 day 1 of adjuvant FOLFOX 6 on 1/23/2020.  9. On 2/5/2020 FOLFOX 6 cycle 2 delayed secondary to neutropenia.  Patient was given 3 days of Granix injection.  After cycle #2 we planned 3 days of Granix with each cycle.   10. Patient also intolerant of oral iron.  Patient received 2 doses of IV injectafer on 02/05/2020 and 02/12/2020.   11. 02/12/2020 Proceed with cycle #2 of FOLFOX 6 with 3 days of Granix.  12. On 3/11/2020 cycle 4 postponed secondary to abdominal pain and occasional low-grade fevers.  CT scans ordered.  13. Cycle #4 resumed after CT scan revealed no evidence of disease.  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 Ye. patient has no fever.  Continue to observe.   14. Grade 3  hand-foot syndrome secondary to 5-FU after cycle #7 FOLFOX 6 chemotherapy, prompting ER visit.  Also has worsening peripheral neuropathy.   15. Cycle #8 FOLFOX 6 was given on 5/13/2020, with 50% dose reduction for both 5-FU and oxaliplatin, and also examination of bolus 5-FU.   16. PET scan examination on 5/21/2020 reported no evidence of metastatic disease in the chest abdomen pelvis.  Stable 6 mm RUL pulmonary nodule.  17. On 5/27/2020, I discussed with the patient that we can discontinue chemotherapy at this time.   18. Patient had a surgical procedure for low anterior colon resection, coloanal anastomosis on 8/3/2020.  19. CT scan for chest abdomen pelvis on 9/9/2020 reported a new 8 mm noncalcified pulmonary nodule in the left lower lobe of the lung.  Otherwise stable right upper lobe 6 mm pulmonary nodule, and no evidence of disease recurrence in the abdomen or pelvis.  20. PET scan examination on 9/18/2020 reported multiple hypermetabolic small pulmonary nodules/ new pulmonary nodules.   21. Patient was started on pelvic chemotherapy with FOLFIRI regimen on 10/13/2020.   22. 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 It reported tumor mutation burden 5 Muts/Mb, microsatellite stable, TP53 mutation R282W, and others.   23. Cycle #5 was without irinotecan, due to peripheral neuropathy.  24. 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  Patient was switched to weight-based Lovenox injection.  25. Cycle #6 5-FU and irinotecan was delayed by 2 weeks because of the above incident.  26. Patient had a telemedicine evaluation at that the [facility] [place] in February 2021.  They agreed with our treatment plan for palliative chemotherapy followed by maintenance chemotherapy.    27. PET scan examination on 4/6/2021 after cycle #12 palliative chemotherapy reported no  evidence of hypermetabolic metastatic lesion.   28. Patient was started on maintenance chemotherapy with 5-FU and Avastin on 4/13/2021. (Unable to tolerate Xeloda because of a significant hand-foot syndrome).       HISTORY OF PRESENT ILLNESS:  The patient is a 41 y.o. female the above-mentioned history who is here today for lab review and evaluation prior to her maintenance chemotherapy with 5-FU, leucovorin, Avastin.  She continues to tolerate treatment well overall with counts that are stable.    She was started on Levaquin last treatment after urinalysis with culture showed evidence of UTI though patient was asymptomatic.    Also when seen 2 weeks ago blood pressure was running higher, specifically with systolic over 140.  Dr. Nguyen prescribed lisinopril 10 mg to take daily.  Unfortunately this caused the patient to feel extremely weak and fatigued, noting feeling worse than she ever has with chemo.  She was getting blood pressure readings with systolic in the 80s at home.  She therefore held further lisinopril.  She was also having some nausea and vomiting which was felt to be related to the Levaquin.    All that to say as she is reviewed back today she states she has been checking her blood pressure at home with systolic BP typically in the 120-130s and diastolic 80s to 90s.  Her pressure in the office today is 138/83.  We discussed that this point with her intolerance to the lisinopril and borderline hypertension we will just monitor this.    Patient does continue with cyclical hand-foot symptoms presenting around 24 hours after she is unhooked from the 5-FU infusional ball.  This specifically leads to peeling of her hands and feet with the hands being more involved.  She does have chronic darkening of the skin.  All this is tolerable and manageable per her report.    Finally the patient does have a new small open area in her lower abdomen just above the ostomy.  The patient's skin is quite friable with  repeated bandaging for her ostomy and previous radiation to this area.  She is asking what to dress this with.    She denies other concerns this time.    Past Medical History:   Diagnosis Date   • Abdominopelvic abscess (CMS/HCC) 08/12/2020    ADMITTED TO [facility]   • Abnormal Pap smear of cervix 02/02/1998    JULIUS I   • Anemia in pregnancy    • Anxiety    • Bilateral epiphora 04/2020   • Bilateral hand swelling 05/02/2020    SEEN AT [facility] ER   • Cervical lymphadenitis 06/06/2012    SEEN AT [facility] ER   • Chemotherapy induced diarrhea 01/2021   • Chemotherapy induced neutropenia (CMS/HCC) 04/2020   • Chemotherapy-induced nausea 02/2020   • Chemotherapy-induced thrombocytopenia 05/2020   • Chronic diarrhea    • Colon polyps     FOLLOWED BY DR. GERONIMO ESPARZA   • Cystitis 04/24/2020    WITH DEHYDRATION, ADMITTED TO [facility]   • Cystitis 10/27/2020   • Depression    • Drug-induced peripheral neuropathy (CMS/HCC) 05/2020   • Factor V Leiden mutation (CMS/[facility])    • Fistula of intestine    • GERD (gastroesophageal reflux disease)    • Hand foot syndrome 05/2020   • Heart murmur     IN CHILDHOOD   • Hematochezia    • Hemorrhoids    • Heterozygous factor V Leiden mutation (CMS/[facility])     DX 8-2-2019   • History of anemia    • History of chemotherapy 2019    FOLLOWED BY DR. ALEXANDRU HUNT   • History of gestational diabetes    • History of pre-eclampsia    • History of pre-eclampsia 1998   • History of radiation therapy 2019    FOLLOWED BY DR. JAVON LEWIS   • History of TB skin testing 01/17/2009    TB Skin Test   • HPV in female 1998   • Hypokalemia 09/2019   • Hypomagnesemia 09/2019   • Hyponatremia 06/2021   • IBS (irritable bowel syndrome)    • Ileostomy in place (CMS/[facility])     FOLLOWED BY DR. GERONIMO ESPARZA   • Infected insect bite of neck 05/27/2016    SEEN AT [facility]   • Kidney stones 08/09/2007    SEEN AT [facility] ER   • Lump of right breast     SWOLLEN LYMPH NODE   • Lung cancer (CMS/HCC) 09/28/2020    METASTATIC LUNG CANCER   •  Macrocytosis 2021   • Monopolar depression (CMS/HCC)    • On anticoagulant therapy    • Palmar-plantar erythrodysesthesia 2021   • Perirectal abscess 2020   • Pulmonary embolism without acute cor pulmonale (CMS/HCC) 09/20/2019    X 3; ADMITTED TO [facility]   • Pulmonary nodule, right 2020   • Rectal cancer (CMS/HCC) 2019    STAGE IIA, INVASIVE MODERATELY DIFFERENTIATED ADENOCARCINOMA, CHEMO AND XRT FINISHED 2019   • Right shoulder pain 2020    SEEN AT [facility] ER   • Right ureteral stone 10/01/2019    SEEN AT [facility] ER   • SAB (spontaneous ) 1996     A2-1 INDUCED   • Sciatica    • Sepsis due to cellulitis (CMS/HCC) 2002    LEFT GREAT TOE, ADMITTED TO [facility]   • Tachycardia 2020   • Thrombosis of superior vena cava (CMS/HCC) 2020    AND BRACHIOCEPHALIC VEIN, ADMITTED TO [facility]   • Urinary urgency 2020     Past Surgical History:   Procedure Laterality Date   • ABDOMINAL SURGERY     • CHOLECYSTECTOMY N/A 10/10/2003    LAPAROSCOPIC WITH CHOLANGIOGRAM, DR. JAMEY TALAVERA AT [facility]   • COLON RESECTION N/A 2019    Procedure: laparoscopic low anterior colon resection with mobilization of splenic flexure and diverting loop ileostomy: ERAS;  Surgeon: Padmaja Esparza MD;  Location: [facility] OR;  Service: General   • COLON RESECTION N/A 8/3/2020    Procedure: LOW ANTERIOR COLON RESECTION, COLOANAL ANASTOMOSIS, MOBILIZATION SPLENIC FLEXURE;  Surgeon: Padmaja Esparza MD;  Location: [facility] MAIN OR;  Service: General;  Laterality: N/A;   • COLONOSCOPY N/A 7/15/2020    PATENT ANASTAMOSIS IN MID RECTUM, RESCOPE IN 1 YR, DR. PADMAJA ESPARZA AT [facility]   • COLONOSCOPY W/ POLYPECTOMY N/A 2019    15 MM TUBULOVILLOUS ADENOMA POLYP IN HEPATIC FLEXURE, 20 MMTUBULOVILLOUS ADENOMA WITH HIGH GRADE DYSPLASIA IN RECTOSIGMOID, 4 CM MASS IN MID RECTUM, PATH: INVASIVE MODERATELY DIFFERENTIATED ADENOCARCINOMA, DR. JENNIFER LI AT [facility]   • DILATATION AND EVACUATION N/A    •  ENDOSCOPY N/A 07/08/2019    LA GRADE A ESOPHAGITIS, GASTRITIS, ALL BIOPSIES BENIGN, DR. JENNIFER LI AT [facility]   • INCISION AND DRAINAGE PERIRECTAL ABSCESS N/A 8/14/2020    Procedure: INCISION AND DRAINAGE OF retrorectal dehiscence abcess with drain placement and irrigation;  Surgeon: Geronimo Ye MD;  Location: [facility] OR;  Service: General;  Laterality: N/A;   • PAP SMEAR N/A 01/24/2014   • SIGMOIDOSCOPY N/A 7/24/2019    INFILTRATIVE PARTIALLY OBSTRUCTING LARGE RECTAL CANCER, AREA TATOOED, DR. GERONIMO YE AT [facility]   • SIGMOIDOSCOPY N/A 11/23/2019    AN ULCERATED PARTIALLY OBSTRUCTING MASS IN MID RECTUM, AREA TATTOOED, DR. GERONIMO YE AT [facility]   • SIGMOIDOSCOPY N/A 7/22/2021    PATENT ANASTAMOSIS IN DISTAL RECTUM, RESCOPE IN 1 YR, DR. GERONIMO YE AT [facility]   • TRANSRECTAL ULTRASOUND N/A 7/24/2019    Procedure: ULTRASOUND TRANSRECTAL;  Surgeon: Geronimo Ye MD;  Location: [facility] ENDOSCOPY;  Service: General   • URETEROSCOPY LASER LITHOTRIPSY WITH STENT INSERTION Right 10/30/2019    DR. ESTUARDO BEASLEY AT [place]   • VAGINAL DELIVERY N/A 09/18/1998   • VENOUS ACCESS DEVICE (PORT) INSERTION Left 8/9/2019    Procedure: INSERTION VENOUS ACCESS DEVICE;  Surgeon: Sj Joseph MD;  Location: [facility] OR [facility];  Service: General   • VENOUS ACCESS DEVICE (PORT) INSERTION N/A 12/18/2020    Procedure: INSERTION VENOUS ACCESS DEVICE right side, removal venous access device left side;  Surgeon: Sj Joseph MD;  Location: [facility] OR [facility];  Service: General;  Laterality: N/A;   • WISDOM TOOTH EXTRACTION Bilateral 1993       HEMATOLOGIC/ONCOLOGIC HISTORY:      The patient reports she has intermittent rectal bleeding and urgency, mucous with her stool, starting sometime in 2016. At that time she was referred to GI service, and was diagnosed of irritable bowel syndrome. Nevertheless she had worsening urgency for bowel movement, and had a couple of incidents losing control of stool. She was recently seen by  Roland Thorpe MD again and had colonoscopy and EGD exam on 07/08/2019. She was found having a circumferential rectal mass. According to the procedure note, the patient had a fungating circumferential bleeding 4 cm mass in the middle rectum, at distance between 13 cm and 17 cm from the anus. Mass was causing partial obstruction. There were also colon polyps found at the hepatic flexure and also at the rectosigmoid junction 23 cm from the anus. Both were resected and retrieved. EGD examination reported grade A esophagitis at the GE junction and patchy discontinuous erythema and aggravation of the mucosa without bleeding in the stomach body. There is normal mucosa of the duodenum. Pathology evaluation from this procedure reported moderately differentiated adenocarcinoma involving the rectal mass. The rectal sigmoid junction polyp was tubular/tubulovillous adenoma with high grade dysplasia negative for invasive malignancy. The hepatic flexure polyp was also tubular/tubulovillous adenoma negative for high grade dysplasia or malignancy. The biopsy from the esophagus reports squamocolumnar mucosa with inflammatory changes suggestive of mild reflux esophagitis, negative for interstitial metaplasia. Gastric biopsy was benign and duodenal biopsy was also benign. There is no mention of H-pylori.     Patient was subsequently referred to colorectal surgeon Padmaja Ye MD for further evaluation. The patient had CT scan examination for chest, abdomen and pelvis requested by Dr. Ye and were done on 07/13/2019. The study reported no evidence of lymphadenopathy in the abdomen and pelvis. There was wall thickening of the rectosigmoid junction. Normal spleen, pancreas, adrenal glands and kidneys. There was fatty liver infiltration but no focal lymphatic lesions. There was a small 6-7 mm noncalcified nodule in the right upper lobe and a tiny 3 mm subpleural nodule in the right middle lobe. No mediastinal or hilar lymphadenopathy.      Dr. Ye performed staging rectal ultrasound examination. This study reported tumor penetrating through the muscularis propria as T3 disease; there were no lymph nodes identified.    She had a hospitalization in late September 2019 because of newly discovered pulmonary emboli.  She was on prophylactic Lovenox prior to the incident of PE.  Now she is on full dose Xarelto anticoagulation.  Patient reports no further chest pain dyspnea.  She denies bleeding or bruising.  During her hospitalization, she was seen by Dr. Ye, who plans to have surgery 8 to 12 weeks after finishing radiation therapy.  She finished her radiation on 9/19/2019.     I noticed patient also presented to the emergency room on 10/1/2019 complaining of right flank area pain.  I reviewed the images studies and indeed she has a very small 1 to 2 mm obstructing kidney stone in the UV junction.  Patient is still symptomatic with some pains and dysuria.  She denies fever sweating or chills.    Laboratory study on 10/7/2019 reported normal CBC including hemoglobin 13.1, platelets 301,000, WBC 6170 and ANC 4900 lymphocytes 590 monocytes 480.      The nurse reported malfunction of port-a-catheter on 10/7/2019.  Port study on 10/8/2019 showed fibrin sheath around the distal aspect of the Mediport catheter in the SVC. This does not appear to hinder injection, but does prevent aspiration at this time.    Patient underwent surgical resection of the colon on 12/2/2019 with Dr. Ye.  Pathology evaluation reported residual T3 disease, also 1 out of 14 lymph nodes positive for malignancy.  So this patient in either had at least stage IIIb disease (T3 N1 M0?).      Adjuvant chemotherapy FOLFOX 6 will be started on 1/22/2020.  Laboratory study reported iron saturation 10%, free iron 31 TIBC 319 and ferritin 168.  Her hemoglobin was 11.8, WBC 5600, and platelets 347,000.    Patient was here on 02/12/2020 for cycle 2 of her FOLFOX.  This is delayed x1 week  secondary to neutropenia.  The patient ultimately received 3 days worth of Neupogen with recovery of her blood counts.  Of note, the patient struggled with significant nausea without any episodes of vomiting following cycle #1 of chemotherapy resulting in significant weight loss.  She is up 12 pounds over the last week as her appetite has normalized.  We will add Emend to her care plan.    She is having several loose stools per her colostomy and has been hesitant to take Imodium due to prior history of constipation.  I encouraged her to try this with a maximum of 8 tablets/day.  She denies any infectious symptoms including fevers or chills.  No excess bleeding or bruising noted.  She had the expected cold sensitivity related to the Oxaliplatin for about 3 days following treatment.    Labs from 02/12/2020 demonstrated total white blood cell count of 5.14, ANC of 3.06, hemoglobin of 11.2, platelets of 211,000.  She was found to be iron deficient last week and is intolerant to oral iron secondary to GI distress.  For this reason, she initiated IV iron therapy with Injectafer last week.  She had received her second dose 02/12/2020    Patient has normalized hemoglobin 12.2 on 2/26/2020.    She reported on 5/5/2020 she had a recent visit to the emergency department for what was suspected to be an allergic reaction.  She called our on-call service on Saturday with reports of hand and face swelling.  She was instructed to proceed to the emergency department at which time she was assessed and prescribed a Medrol Dosepak.  She had just completed her 5-FU and was unhooked on Friday, 5/3/2020.  Her symptoms have improved.  She does report persistent hyperpigmentation and mild swelling of the palms of the hands but this is much improved.  It was her right hand specifically that was swollen.  Her facial swelling has resolved.  She continues on Cefdinir nd since has 1 day remaining, she was prescribed cefdinir for a UTI requiring  hospitalization at the end of April.  Reports no new symptoms.  Her labs are stable.  She is scheduled for treatment again.    Patient states at this time she is not tolerating her chemotherapy well.     Patient seen in the emergency department on 5/2/2020 for what was suspected to be an allergic reaction.  She called our on-call service on Saturday explaining that she was experiencing hand and face swelling.  She was instructed to proceed to the emergency department at which time she was assessed and prescribed a Medrol Dosepak.  She had just completed her 5-FU and was unhooked on Friday, 5/1/2020.      She reports since her ED visit on 5/2/2020 her symptoms have not improved. Her hands and feet were swollen upon presenting to the ED and she could barely make a fist. She states that she feels so swollen she is not able to stand it. She states that her skin on the hands and feet are peeling extensively as well, besides changing color to more dark.     She also reports significant fatigue after her first week of the 5-FU treatment but she expected this side effect. She also notices significant increase in her neuropathy to the point that she is not able to even walk around in her home without her house slippers due to irritation from her rugs. She denies and associated nausea or vomiting at this time. She also has episodes of epistaxis every day after the chemotherapy cycle #7.  She does report working full-time during the week of chemotherapy.    Laboratory studies, 5/13/2020, show moderate thrombocytopenia platelets 123,000, low normal WBC 4140 including ANC 2720 and normal hemoglobin 13.4.  Because significant hand-foot syndrome, will decrease both 5-FU and oxaliplatin by 50%, and eliminate bolus dose of 5-FU.    On 5/21/2020 patient had a PET scan performed which showed no convincing evidence of residual disease in abdomen or pelvis, no metastatic disease within the chest or neck.     Cycle #8 FOLFOX 6 was given  on 5/13/2020, with 50% dose reduction for both 5-FU and oxaliplatin, and also examination of bolus 5-FU.  She states on 5/27/2020 that with the recent reduction of the chemotherapy she feels significantly better. She has more energy and is able to do her daily routine.      PET scan examination on 5/21/2020 reported no evidence of metastatic disease in chest abdomen pelvis.  There was a stable 6 mm right upper lobe pulmonary nodule.    Laboratory studies on 5/27/2020 showed mild leukocytopenia WBC 3720 but a normal ANC 2250 and lymphocytes 630.  Normal platelets 163,000 and hemoglobin 12.6.  Chemistry lab reported normal renal function, liver function panel and a electrolytes, elevated glucose 164.    Laboratory studies 6/24/2020, showed normal hemoglobin 13.4 but macrocytosis .9.  Normal platelets 210,000 and WBC 5870.  She had normal CMP.     Patient last time was here in late June 2020.  Since that time she had surgical procedure for low anterior colon resection, coloanal anastomosis on 8/3/2020.  She later developed a perirectal abscess, required surgical drainage on 8/14/2020.  She was discharged on 8/27/2020.    Patient reports to me she still has lower abdominal wall vacuum suction in place.  She denies fever sweating chills.  Performance status is ECOG 1.  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  She does have visiting nurse come to the home for wound care.    CT scan for chest abdomen pelvis on 9/9/2020 reported a new 8 mm noncalcified pulmonary nodule in the left lower lobe.  Otherwise stable right upper lobe 6 mm pulmonary nodule, and no evidence of disease recurrence in the abdomen or pelvis.     Laboratory study on 9/16/2020 reported elevated AST 55, ALT 95, alk phosphatase 143 but normal total bilirubin 0.3.  Chemistry lab otherwise unremarkable.  She has completed normal CBC.      Because of the abnormal CT scan examination for chest abdomen pelvis on 9/9/2020  reported a new 8 mm noncalcified pulmonary nodule in the left lower lobe, we obtained a PET scan examination for further evaluation, which was done on 9/18/2020.  This study reported several pulmonary nodules, some of them are hypermetabolic, newly developed compared to previous PET scan in May 2020.  Certainly does highly suspicious for metastatic disease.  There are also hypermetabolic activity in the abdomen and pelvic area which is hard to differentiate from surgical scar versus metastatic malignancy.    Laboratory study on 10/13/2020 reported normal CBC with Hb 13.9, platelets 302,000 and WBC 5520 including ANC 3830.  Chemistry lab reported normalized AST 20, alk phosphatase 116 improved ALT 35, and maintains normal bilirubin, with normal electrolytes and liver function panel.     Patient was started on first cycle of palliative chemotherapy FOLFIRI on 10/13/2020.    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  Patient had a new port placed 12/18/2020, and had held her Xarelto for 2 days prior to surgery.  She presented to the ER on 12/21/20 with complaints of facial and arm swelling for 3 days.  She also noted increased shortness of breath.  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  Thrombus within the right internal jugular vein cannot be excluded.  Patient was started on IV heparin, and eventually transitioned to weight-based Lovenox, which she now continues.      MEDICATIONS    Current Outpatient Medications:   •  clonazePAM (KlonoPIN) 1 MG tablet, TAKE 1 TABLET BY MOUTH THREE TIMES A DAY AS NEEDED FOR ANXIETY OR SEIZURES, Disp: 90 tablet, Rfl: 0  •  Cyanocobalamin (VITAMIN B-12 PO), Take  by mouth., Disp: , Rfl:   •  dicyclomine (BENTYL) 20 MG tablet, TAKE 1 TABLET BY MOUTH EVERY 6 HOURS AS NEEDED, Disp: 360  tablet, Rfl: 0  •  diphenoxylate-atropine (LOMOTIL) 2.5-0.025 MG per tablet, Take 1 tablet by mouth 4 (Four) Times a Day As Needed for Diarrhea., Disp: 120 tablet, Rfl: 1  •  enoxaparin (LOVENOX) 100 MG/ML solution syringe, Inject 0.9 mL under the skin into the appropriate area as directed Every 12 (Twelve) Hours., Disp: 60 mL, Rfl: 2  •  escitalopram (LEXAPRO) 10 MG tablet, TAKE 1 TABLET BY MOUTH EVERY DAY, Disp: 30 tablet, Rfl: 5  •  famotidine (PEPCID) 20 MG tablet, TAKE 1 TABLET BY MOUTH TWICE A DAY, Disp: 180 tablet, Rfl: 1  •  fluorouracil in dextrose 5 % 250 mL infusion, Infuse  into a venous catheter 1 (One) Time. Takes twice a month., Disp: , Rfl:   •  lisinopril (PRINIVIL,ZESTRIL) 10 MG tablet, Take 1 tablet by mouth Daily., Disp: 30 tablet, Rfl: 1  •  Loratadine (CLARITIN) 10 MG capsule, Take 10 mg by mouth Every Evening., Disp: , Rfl:   •  mineral oil-hydrophilic petrolatum (AQUAPHOR) ointment, Apply 1 application topically to the appropriate area as directed As Needed for Dry Skin., Disp: , Rfl:   •  nystatin (MYCOSTATIN) 298786 UNIT/GM cream, Apply  topically to the appropriate area as directed 2 (Two) Times a Day As Needed (rash)., Disp: 30 g, Rfl: 2  •  ondansetron (ZOFRAN) 8 MG tablet, Take 1 tablet by mouth 3 (Three) Times a Day As Needed for Nausea or Vomiting., Disp: 60 tablet, Rfl: 5  •  prochlorperazine (COMPAZINE) 10 MG tablet, Take 1 tablet by mouth Every 6 (Six) Hours As Needed for Nausea or Vomiting., Disp: 30 tablet, Rfl: 2  •  promethazine (PHENERGAN) 25 MG tablet, Take 1 tablet by mouth Every 6 (Six) Hours As Needed for Nausea or Vomiting., Disp: 60 tablet, Rfl: 2  No current facility-administered medications for this visit.    Facility-Administered Medications Ordered in Other Visits:   •  fluorouracil (ADRUCIL) 4,850 mg, sodium chloride 0.9 % 143 mL 240 mL chemo infusion - FOR HOME USE, 2,400 mg/m2 (Treatment Plan Recorded), Intravenous, Once, Gaby Cruz, APRN  •   leucovorin 810 mg in sodium chloride 0.9 % 290.5 mL IVPB, 400 mg/m2 (Treatment Plan Recorded), Intravenous, Once, Gaby Cruz APRN    ALLERGIES:   No Known Allergies    SOCIAL HISTORY:       Social History     Tobacco Use   • Smoking status: Current Every Day Smoker     Packs/day: 1.00     Years: 24.00     Pack years: 24.00     Types: Electronic Cigarette, Cigarettes   • Smokeless tobacco: Never Used   Vaping Use   • Vaping Use: Some days   • Substances: Nicotine   • Devices: Disposable   Substance Use Topics   • Alcohol use: Not Currently     Comment: Caffeine use rare   • Drug use: No         FAMILY HISTORY:   Mother has positive factor V Leiden mutation, although she did not have thrombosis, mother also is coronary disease with stenting, she also is occasional bruising.  Maternal grandmother had DVT, she had multiple surgical procedures.  Patient mother's half-brother had metastatic colon cancer at diagnosis in his 50s.  Maternal great aunt has breast cancer.  Patient will follow his skin cancer in his 60s, exclusive.    Review of Systems   Constitutional: Negative for activity change, appetite change, chills, diaphoresis, fatigue, fever and unexpected weight change.        She works full-time in a dental clinic.   HENT: Negative for mouth sores and sore throat.    Eyes: Negative for visual disturbance.   Respiratory: Negative for cough and shortness of breath.    Cardiovascular: Negative for chest pain and leg swelling.   Gastrointestinal: Negative for abdominal distention, abdominal pain, blood in stool and nausea.   Endocrine: Negative for cold intolerance.   Genitourinary: Negative for dysuria and hematuria.   Musculoskeletal: Negative for arthralgias and joint swelling.   Skin: Positive for color change (Hands and feet bilaterally) and wound (abdomen - see HPI).        See HPI   Allergic/Immunologic: Positive for immunocompromised state (Chemotherapy).   Neurological: Negative for dizziness and  "light-headedness.   Hematological: Negative for adenopathy. Bruises/bleeds easily (Easy bruising at the site of injection of Lovenox ).   Psychiatric/Behavioral: Negative for agitation and confusion.       08/31/2021 ROS unchanged from above except as updated.       Vitals:    08/31/21 0942   BP: 138/83   Pulse: 107   Resp: 16   Temp: 97.1 °F (36.2 °C)   TempSrc: Temporal   SpO2: 97%   Weight: 86.4 kg (190 lb 6.4 oz)   Height: 166 cm (65.35\")   PainSc: 0-No pain     Current Status 8/3/2021   ECOG score 0     Physical Exam  Vitals reviewed.   Constitutional:       General: She is not in acute distress.     Appearance: Normal appearance. She is well-developed.   HENT:      Head: Normocephalic and atraumatic.   Eyes:      Pupils: Pupils are equal, round, and reactive to light.   Cardiovascular:      Rate and Rhythm: Regular rhythm. Tachycardia present.      Heart sounds: Normal heart sounds. No murmur heard.     Pulmonary:      Effort: Pulmonary effort is normal. No respiratory distress.      Breath sounds: Normal breath sounds. No wheezing.   Abdominal:      General: Bowel sounds are normal. There is no distension.      Palpations: Abdomen is soft.      Tenderness: There is no abdominal tenderness.      Comments: Ostomy in right abdomen.  Scattered bruises on the abdomen bilaterally from Lovenox injections.   Musculoskeletal:      Cervical back: Normal range of motion.      Right lower leg: No edema.      Left lower leg: No edema.   Skin:     General: Skin is warm and dry.      Coloration: Skin is not jaundiced.      Findings: Bruising and lesion (small (<5mm) open area in mid lower abdomen above ostomy; scant purulent drainage) present. No rash.   Neurological:      Mental Status: She is alert and oriented to person, place, and time. Mental status is at baseline.   Psychiatric:         Mood and Affect: Mood normal.         Thought Content: Thought content normal.     08/31/2021 physical exam is unchanged from above " except as updated.    RECENT LABS:  Results from last 7 days   Lab Units 08/31/21  0931   WBC 10*3/mm3 6.16   NEUTROS ABS 10*3/mm3 4.19   HEMOGLOBIN g/dL 15.0   HEMATOCRIT % 45.2   PLATELETS 10*3/mm3 150       Results from last 7 days   Lab Units 08/31/21  0931   SODIUM mmol/L 136   POTASSIUM mmol/L 4.5   CHLORIDE mmol/L 103   CO2 mmol/L 23.7   BUN mg/dL 9   CREATININE mg/dL 0.78   CALCIUM mg/dL 9.1   ALBUMIN g/dL 3.60   BILIRUBIN mg/dL 0.3   ALK PHOS U/L 98   ALT (SGPT) U/L 52*   AST (SGOT) U/L 26   GLUCOSE mg/dL 120           Assessment/Plan      ASSESSMENT:   1.  Rectal cancer, rectal ultrasound examination reported T3N0 disease without lymphadenopathy.   · CT scan of chest, abdomen and pelvis reported no lymphadenopathy in the abdomen, pelvis or chest. She does have small pulmonary nodule 6-7 mm and 2 mm with indeterminate feature. There is no solid evidence of metastatic disease otherwise.   · Based on the CT scan and rectal ultrasound imaging studies, this patient had stage IIA (T3N0M0) disease.   · She had PET scan examination on 8/8/2019 which reported a hypermetabolic right upper lobe pulmonary nodule 6 mm with SUV 5.6.  This is suspicious for metastatic disease however it is too small to be biopsied.  This patient may have stage IV disease.   · She initiated concurrent radiation with continuous 5-FU on 8/12/2019.  · Patient finished concurrent chemoradiation therapy.  · Patient underwent surgical resection of the rectal tumor and diverting loop ileostomy on 12/2/2019 with Dr. Ye.  Pathology evaluation reported residual T3 disease, with 1 out of 14 lymph nodes positive for malignancy.  Certainly this patient has at least stage IIIb rectal cancer (T3 N1 M0?)  · On 1/22/2020 adjuvant chemotherapy FOLFOX 6 regimen initiated.   · On 2/5/2022 cycle #2 was delayed secondary to neutropenia.  She was given 3 days of Granix.   · Emend added with cycle 3.  With improved nausea control  · Continuing home Granix x3  days following 5-FU disconnect  · 3/11/2020 due for cycle 4, however, she is experiencing progressive abdominal pain and occasional fevers.   · CT scan performed on 3/13/2020 reveals no evidence of progressive or recurrent disease.  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  · Patient hospitalized 4/24-4/26/20 after cycle 5 of chemotherapy with acute UTI.  CT abdomen/pelvis noting fluid collection in the presacral region having diminished in size compared to CT dated 3/13/2020.  Patient was evaluated by Dr. Ye while in the hospital with further plans to evaluate possible colovaginal fistula following completion of chemotherapy.  Patient did respond to IV antibiotics and discharged home on oral cefdinir.  · 4/29/2020 cycle 6 of FOLFOX.  Urinary symptoms have resolved   · Patient seen in the [facility] ED on 5/2/2020 for what was suspected to be an allergic reaction.  She called our service on Saturday explaining that she was experiencing hand and face swelling.  She was instructed to proceed to the emergency department at which time she was assessed and prescribed a Medrol Dosepak.  She had just completed her 5-FU and was unhooked on Friday, 5/1/2020.  Her symptoms have resolved in the office on assessment, 5/5/2020.  · The patient had grade 3 hand-foot syndrome based on symptomology.  Patient had cycle #8 of 5-FU on 5/13/2020. Due to her symptoms and poor tolerance to the 5-FU, her treatment dose will be reduced 50% for oxaliplatin and infusional 5-FU.  Bolus 5-FU will be discontinued..  · On 5/21/2020 patient had a PET scan and it showed no evidence of of metastatic disease in the neck, chest, abdomen or pelvis.  There was fluid accumulation/abscess.   · On 5/27/2020 discussed with the patient that we can discontinue chemotherapy at this time.  She will follow-up with Dr. Ye to discuss a possibility to reverse the ileostomy.  We likely will obtain anther PET scan in 3 to 4 months for reassessment.     · Patient was seen by Dr. Ye on 6/19/2019 for evaluation and to discuss possible take down of her ileostomy.  She is scheduled to have a gastrografin enema on 7/2/2020 to evaluate for a fistula, and then a colonoscopy on 7/15/2020, both done by Dr. Ye.  She states that based on the above imaging and procedure findings, she may have a more extensive surgery done or just have her ileostomy reversed, which would likely be done in August 2020.  This will all be coordinated under the care of Dr. Ye.     · CT scan from 09/09/2020 and also compared to her previous PET scan examination from 05/21/2020 and also the original PET scan from 08/09/2019.  The original PET scan there is a small right upper lobe pulmonary nodule 6 mm with SUV 5.6. So in the 05/2020 PET scan the nodule is still there but activity seems much less and this most recent CT scan examination also documented the preexisting small pulmonary nodule however there is a new left lower lobe pulmonary nodule 8 mm in size and I shared with the patient today this nodule is suspicious for metastatic disease. Laboratory studies reported minimal CEA level 1.32 on 09/09/2020. Liver function panel was only marginally abnormal with the ALT 45, the remaining is normal. However laboratory study today showed worsening AST 55, ALT 95 and alkaline phosphatase 143 but is still normal, total bilirubin 0.3.   · Recommended to have repeat PET scan examination for assessment because the left lower lobe pulmonary nodule is too small to be biopsied, and if the PET scan reports hypermetabolic lesion, I recommended to have stereotactic radiation therapy. Explained to patient that she is not a really good candidate to have thoracotomy for resection because she still has unhealed wound in the abdomen. Stereotactic radiation therapy will likely be a more feasible choice.   · PET scan examination obtained on 9/18/2020 showed metastatic disease.  It reported a few hypermetabolic  pulmonary nodules, and some new small pulmonary nodules, all of them highly suspicious for metastatic disease.  She also has hypermetabolic activity in the abdomen and pelvic area which could be related to scar tissue from her recent surgery versus metastatic disease.  Nevertheless overall picture fits with metastatic cancer.  · Discussed with the patient on 9/20/2020, we reviewed the PET scan images together, and I recommended to have systematic chemotherapy, I do not think stereotactic reading therapy to the hypermetabolic lesions in the lungs warranted at this time because even those will be treated with reading therapy, she will still need systematic chemotherapy.  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  I did discuss with the patient using anti-EGFR monoclonal antibody versus anti-VEGF monoclonal antibody.     · Palliative chemotherapy cycle#1 FOLFIRI was started on 10/13/2020.  No bolus 5-FU given considering previous poor tolerance.    · NGS study from [facility] reported positive for [place] mutation.  Microsatellite stable, mutation burden 5/Mb.   · Discussed with the patient 10/27/2020, because of the [place] mutation, she is not a candidate for anti-EGFR monoclonal antibody such as Erbitux or vectibix.  She could be a candidate for anti-VEGF monoclonal antibody, however because of active wound, she is not a candidate right now at this moment.  · Patient seen in the ED for acute right neck pain on 11/16/2020.  CT angiogram as well as CT of the cervical spine performed with no acute findings but notably one of her pulmonary nodules, left upper lobe, somewhat decreased in size.  Patient given prednisone pack.  Further details below.  · Cycle #6 chemotherapy will be resumed on 1/5/2021.  Started back on original irinotecan.  · PET scan examination obtained on 1/12/2021 after cycle #6 chemotherapy reported improved pulmonary nodule hypermetabolic activity.   Confirmed these are metastatic lesions.  · Patient is evaluated 1/19/2020, will continue cycle #7 FOLFIRI chemotherapy.  However Avastin will be on hold see next section.   · Patient was reevaluated on 2/2/2021, will continue chemotherapy cycle #8 FOLFIRI.  Avastin / biosimilar will be added.    · She talked to [facility] specialist in mid February 2021, and had recommended palliative chemotherapy without surgical intervention of metastatic lung lesions.   · On 3/2/2021, patient will proceed with cycle #10 FOLFIRI plus bevacizumab.  After 12 cycles, plan to start maintenance treatment.  · 3/16/2021 cycle 11.  Plus bevacizumab.  · 3/30/2021 cycle 12 FOLFIRI plus bevacizumab.  Having issues with worsening nausea despite premeds with dexamethasone, Aloxi, Emend, and Zofran at home.  Patient is requesting a dose of Phenergan, which is helped her in the past.  We will give this to her with treatment today.  · PET scan examination on 4/6/2021 reported no evidence of hypermetabolic metastatic lesion.  · Discussed with the patient on 4/13/2021, we reviewed the PET scan results.  We recommended to switch to maintenance chemotherapy without irinotecan.  We will continue 5-FU and Avastin every 2 weeks.  We discussed possibility of switching to oral Xeloda treatment.  Discussed with the patient for side effects more so with hand-foot syndrome.  Patient is agreeable.   · Xeloda 2000 mg twice daily 7 days on, 7 days off along with Avastin initiated 4/27/2021.  · After only 5 doses of Xeloda significant hand-foot syndrome, Xeloda held. Patient requesting to be transitioned back to infusional 5-FU, as she felt that she tolerated infusional 5-FU without any problem.  The patient will receive 5-FU leucovorin and Avastin every 2 weeks.  Xeloda discontinued.  · 5/25/2021 continued cycle #4 maintenance 5-FU, leucovorin, Avastin tolerating this much better so far, we will continue this. Recommended to have 12  cycles of maintenance chemotherapy, then obtain PET scan for reevaluation.  We could discuss further treatment plan at that time.  · 8/31/2021 patient reviewed today, due for her 10th cycle of additional maintenance 5-FU/leucovorin plus Avastin.  Tolerating well overall.  She did have some mild increasing hypertension with attempted treatment with lisinopril (further discussed below).  Could not tolerate.  Blood pressure reasonable today and we will monitor.  She does also have a new very tiny open sore on her abdomen, opening when she twisted yesterday in her chair.  This has a scant amount of purulent drainage (discussion below also).  We discussed option of holding Avastin today but patient very much does not want to do this.  She will monitor closely.    2 .  Pulmonary nodule, hypermetabolic on PET scan.   · Her original PET scan examination from 8/8/2019 reported a small 8 mm right upper apex pulmonary nodule but hypermetabolic SUV 5.1.  That was too small to be biopsied, however there was always a possibility of metastatic disease considering that high activity despite a such a small nodule.  · Her chest CT scan examination from 3/13/2020 reported this shrank to 6 mm.    · PET scan examination on 5/21/2020 reported no metabolic activity at this residual nodule.  This needs to be monitored.    · PET scan examination 9/18/2020 reported couple of hypermetabolic pulmonary nodules, besides a few extra small pulmonary nodules.  Those are highly suspicious for metastatic disease.    · PET scan examination obtained on 1/12/2021 after cycle #6 chemotherapy reported improved a pulmonary nodule hypermetabolic activity.  Confirmed these are metastatic lesions.  · 1/19/2021, patient reports she will see thoracic surgeon tomorrow at [facility] where she seeks second opinion evaluation, and to see whether she would be a candidate for resection of pulmonary nodules.  I discussed with the patient, she had at least  2 hypermetabolic lesions although they responded to chemotherapy, I do not think she would be a candidate for surgery.   · Thoracic surgeon from [facility] recommended metastectomy and to discontinue chemotherapy afterwards.   · Patient had a third opinion evaluation on 2/11/2021 by telemedicine with [facility] physician, who recommended palliative chemotherapy with no surgical intervention for metastatic pulmonary lesions.  Patient was agreeable with this strategy.      3.   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  Low-dose Lovenox discontinued and initiated Xarelto 20 mg daily.  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  · Patient held Xarelto for 2 days prior to MediPort removal from the left chest wall and placement of new port in the right chest wall on 12/18/2020.  She resumed Xarelto that evening.  · Progressive swelling in the neck, face, upper extremities prompting hospitalization with CT scan showing thrombosis in the right subclavian vein and SVC.  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  Although she had been off of Xarelto for a few days, clearly Xarelto was not prohibiting progression of her thrombosis after she resumed treatment and there was some evidence to suggest thrombosis had been present at least in the innominate vein on prior scan in September but now appears chronic.  Current acute  thrombosis involving SVC and right subclavian.  · Patient was admitted and placed on heparin drip  · Patient was evaluated by vascular surgery and intervention was not recommended for thrombolysis/thrombectomy.  · On 12/22/2020, the patient developed worsening shortness of breath, increasing upper extremity edema consistent with worsening SVC syndrome.  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  · Symptoms improved and patient was discharged 12/24/2020 on Lovenox 100 mg every 12 hours.    · Returns 12/29/2020 for evaluation continuing Lovenox, overall tolerating it well.  No bleeding issues.  · Improved edema 1/5/2021.  Will continue Lovenox weight-based every 12 hours.  · On 1/19/2021 and 2/2/2021, patient reports improved facial swelling.  She however does have being bruise on her abdomen wall where she does Lovenox injection.  However she does not have a spontaneous epistaxis, gum bleeding or other bleeding.   · On 2/2/2021, we discussed that side effects of Avastin/biosimilar related to thrombosis.  Since this patient already has been on Lovenox, I think the benefits from treatment for her cancer will outweigh that risk of thrombosis, will proceed ahead with Avastin.  I cautioned patient to watch out for signs of worsening thrombosis patient voiced understanding.   · On 3/16/2021, no evidence of worsening DVT, continue Lovenox.  · 5/11/2021 Lovenox dosing will be adjusted due to patient's weight loss.  We discussed she needs 1 mg/kg.  Her syringes are 100 mg syringes she will do 0.9 mL subcu every 12 hours.  · 8/3/2021 Patient continues to have bruising on abdomen from lovenox injections.  Will need to monitor weight and adjust dosing in future if further weight loss.   · Stable condition on 8/17/2021.  Continue to monitor.    4.  This patient also has strong family history for malignancy the  patient had appointment with genetic counseling on September 3, 2019.  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    5.  Mild anemia, improved since her surgery.    · She also has a history of iron deficiency.  Iron studies reveal a saturation of just 10%.  She was started on oral iron but was unable to tolerate due to GI side effects.   · Status post Injectafer 2/5/2020 and 2/12/2020   · Improved hemoglobin 13.5 on 1/5/2021.  · 3/16/2020 when hemoglobin now up to 16.2, hematocrit 47.6.  Patient admits that she has started smoking again, and I have encouraged her to quit.  She denies any snoring or sleep apnea diagnosis.  Patient is not taking oral iron.  We will closely monitor.  · 3/30/2020 hemoglobin 15.7, HCT 47.5.  Patient states that she has cut back significantly on her smoking, although not quit yet.  I have encouraged her to continue working on this.  We will continue to closely monitor.  · Hemoglobin 14.6 on 6/8/2021 at the meantime he has worsening macrocytosis .6.    · Laboratory studies 6/22/2021 reported excellent folate more than 20 ng/mL, however low normal B12 level 357 pg/mL.    · On 7/6/2021, normal hemoglobin 15.8, .9 and HCT 47.2%.  Patient was asked to start oral vitamin B12 at 1000 mcg daily.   · 8/31/2021 hemoglobin 15.0, hematocrit 45.  Monitor.    6.  Peripheral neuropathy secondary to chemotherapy.   · This is mild involving bilateral hands and feet as reported on 9/16/2020.   · Will avoid chemotherapy with oxaliplatin.  · Stable mild neuropathy as of 11/10/2020  · Patient reports worsening peripheral neuropathy and cycle #5 chemotherapy on 12/8/2020 with and without irinotecan.   · On 1/5/2021, patient reports improvement of peripheral neuropathy, will add irinotecan back to chemotherapy.  Continue to monitor and adjust medication.  · On 3/2/2021, patient reports no worsening of peripheral neuropathy after restarting irinotecan.   · This remains stable as of 8/17/2021.      7.  History of hand-foot syndrome grade 3.    · It become so significant after cycle #7 FOLFOX treatment.  Discussed with patient on 5/13/2020, will discontinue the bolus 5-FU dose, and also decrease 50% of the infusion dose through the pump.   · We also use Medrol Dosepak for possible recurrent symptomology.  She responded this well.   · Her hand-foot syndrome improved.  · Under current treatment with FOLFIRI, patient not receiving 5-FU bolus.  No recurrent issues.   · Patient will be switched to maintenance chemotherapy using Xeloda in late April 2021.  · Following 5 doses of Xeloda, significant hand-foot syndrome with pain in the feet, significant erythema.  Xeloda held.  Will be further discussed with Dr. Nguyen to determine if the patient will resume 5-FU versus a dose reduction to Xeloda.  · Aggressive emollient moisturizer use twice daily for the past week and patient reports improvement in the dryness and erythema.  Xeloda will now be discontinued and patient will switch back to 5-FU.  · As of 5/25/2021 dryness and erythema/hyperpigmentation continues to improve.  Xeloda has been discontinued.  · 6/8/2021, patient reports much improved hand-foot syndrome, with remnant pigmentation on palms.  · On 7/6/2021, patient reports and had a some flareup of hand-foot syndrome, however improved after aggressive moisturizing cream and the urea cream.  Overall much tolerated infusional 5-FU compared to Xeloda.    · 7/20/2021 continues to have mild hyperpigmentation of her hands and feet bilaterally, she continues aggressive moisturization with utter balm with urea.  She also reports some soreness of her tailbone, and we discussed that this is likely due to loss of weight and muscle mass.  I advised her to go ahead and apply moisturizer to this area as there is one tiny fissure.  Also recommended using a waffle pad or doughnut to sit on.  · 8/3/2021 patient reports her hand foot symptoms are stable and without  worsening.  Continue to monitor.  · Symptoms stable, typically flaring about 24 hours after she is unhooked from her 5-FU infusional ball and then settling down with some mild peeling.    8.  Hyperlacrimation and mild scleral irritation related to 5-FU.  She has been taking steroid eyedrops.  This has improved her hyperlacrimation.  · Not currently an issue.  Continue to monitor with 5-FU.      9.  Abnormal liver function panel.  · Overall LFTs have improved with current ALT just slightly elevated at 52, otherwise normal AST of 26, total bilirubin 0.3.  Alk phos 98.  Continue to monitor.    10.  Intermittent leukocytopenia secondary to chemotherapy.   · Not currently an issue.  WBC remains normal with treatment.    11.  Depression.  Patient seen by JULIET Davenport on 11/9/2020.  She was started on mirtazapine.  Lexapro was discontinued.  This has definitely improved her mood with the patient feeling overall much better.  She is sleeping better.  Appetite is improved with her actually eating more than she wants to.    · Condition is stable.  She plans to continue follow-up with supportive oncology.    12.  Intermittent upper abdominal discomfort.  This improves with eating.  After further discussion with the patient she also notes 3 nights of increased reflux when laying down.  We discussed her symptoms could be related to increase stomach acid or potential ulcer.  She is currently taking Pepcid 20 mg daily.  I instructed her to add Prilosec 20 mg daily.   · On 3/16/2021 patient reports some worsening reflux symptoms last week.  She has increased Pepcid to 20 mg twice daily, which did resolve her symptoms.  We discussed she could add Prilosec 20 mg daily as well.   · No complaint today.  Continue to monitor.    13.  Proteinuria: 3/30/2020: Patient is 2+ protein in her urine.  We will continue with Avastin and closely monitor.  No need for 24-hour urine at this time.  · Persistent 2+ proteinuria on 4/13/2021.   continue to monitor.  · 5/25/2021 protein urine only 1+.    · 6/22/2021 persistent 1+ proteinuria.  Continue to monitor.  · 7/6/2021, trace protein.  Continue Avastin treatment and monitoring.  · 8/3/2021 1+ protein, stable from last cycle.   · On 8/17/2021, urine protein 2+.  She also had a 1+ leukocytes.  Patient is not symptomatic such as fever, dysuria or gross hematuria, however urine culture obtained, with >100,000 E. Faecalis. Patient treated with Levaquin.     14.  Tachycardia:   · Patient was seen by Dr. Bustos cardiologist on 4/6/2021.   · Follow up visit 7/22/2021 with plans to repeat evaluation in 3 months    15.  Hypertension.  · /91 at visit 8/17/2021.  She was given clonidine for treatment that day and also prescribed lisinopril 10 mg daily.    · Patient unable to tolerate lisinopril, noting that her blood pressure bottomed out with systolic of barely 80 and feeling very fatigued and wiped out.  She has been checking her blood pressure off the lisinopril with systolics in the 120s to 130s at home and diastolic in the 80s to 90s.  We discussed for now we will monitor this.  If anything we could start her back on lisinopril 5 mg daily if needed.  Blood pressure currently today does not warrant absolutely being on an oral antihypertensive.      16.  Small open abdominal wound presenting yesterday when the patient turned in her chair and felt a small pop.  She has a very tiny, less than 5 mm open area in her abdomen just above the ostomy where the tissue was more friable.  Scant amount of purulent drainage noted but no true infection.  We discussed cleaning this with half peroxide, half sterile water and trying to keep the dressing for the ostomy off of that site to leave it open to air.  She will monitor closely for any worsening signs suggestive of infection.  We did discuss potential to hold Avastin today however patient did not want to do this.      PLAN:  1. Proceed with cycle #11  maintenance chemotherapy with infusional 5-FU, leucovorin, and Avastin today.    2. Patient will remain off lisinopril for now as outlined above.  She will continue to spot check her blood pressure at home and we will reassess when she is here in 2 weeks.  3. Patient to clean new small abdominal wound as outlined above and call with any worsening signs or symptoms of infection.  4. Continue aggressive moisturizing to hands and feet post chemotherapy  5. Continue Lovenox 1 mg/kg twice daily.  6. Continue oral B12 at 1000 mcg daily.  7. Return in 2 weeks for follow-up visit with nurse practitioner and cycle #12 of maintenance infusional 5-FU, leucovorin, Avastin.  8. In 3 weeks patient undergo repeat PET scan.    9. Patient will then see Dr. Nguyen back in 4 weeks for review of PET scan results and to discuss further plan of care.      This patient is on drug therapy requiring intensive monitoring for toxicity.  We did more than just assess side effects of treatment today.       Gaby Cruz, APRN  08/31/21      CC:  WAYNE Worley MD Carrie B. Scharf, MD

## 2021-09-02 ENCOUNTER — DOCUMENTATION (OUTPATIENT)
Dept: SURGERY | Facility: CLINIC | Age: 41
End: 2021-09-02

## 2021-09-02 ENCOUNTER — INFUSION (OUTPATIENT)
Dept: ONCOLOGY | Facility: HOSPITAL | Age: 41
End: 2021-09-02

## 2021-09-02 DIAGNOSIS — Z45.2 ENCOUNTER FOR FITTING AND ADJUSTMENT OF VASCULAR CATHETER: Primary | ICD-10-CM

## 2021-09-02 PROBLEM — K52.1 CHEMOTHERAPY INDUCED DIARRHEA: Status: ACTIVE | Noted: 2021-01-01

## 2021-09-02 PROBLEM — N30.90 CYSTITIS: Status: ACTIVE | Noted: 2020-04-24

## 2021-09-02 PROBLEM — D70.1 CHEMOTHERAPY INDUCED NEUTROPENIA: Status: ACTIVE | Noted: 2020-04-01

## 2021-09-02 PROBLEM — T45.1X5A CHEMOTHERAPY INDUCED DIARRHEA: Status: ACTIVE | Noted: 2021-01-01

## 2021-09-02 PROBLEM — C20 RECTAL CANCER (HCC): Status: ACTIVE | Noted: 2019-07-08

## 2021-09-02 PROBLEM — N30.90 CYSTITIS: Status: ACTIVE | Noted: 2020-10-27

## 2021-09-02 PROBLEM — Z92.3 HISTORY OF RADIATION THERAPY: Status: ACTIVE | Noted: 2019-01-01

## 2021-09-02 PROBLEM — T45.1X5A CHEMOTHERAPY INDUCED NEUTROPENIA: Status: ACTIVE | Noted: 2020-04-01

## 2021-09-02 PROBLEM — R11.0 CHEMOTHERAPY-INDUCED NAUSEA: Status: ACTIVE | Noted: 2020-02-01

## 2021-09-02 PROBLEM — Z92.21 HISTORY OF CHEMOTHERAPY: Status: ACTIVE | Noted: 2019-01-01

## 2021-09-02 PROBLEM — T45.1X5A CHEMOTHERAPY-INDUCED NAUSEA: Status: ACTIVE | Noted: 2020-02-01

## 2021-09-02 PROBLEM — N20.1 RIGHT URETERAL STONE: Status: ACTIVE | Noted: 2019-10-01

## 2021-09-02 PROBLEM — H04.203 BILATERAL EPIPHORA: Status: ACTIVE | Noted: 2020-04-01

## 2021-09-02 PROBLEM — M79.89 BILATERAL HAND SWELLING: Status: ACTIVE | Noted: 2020-05-02

## 2021-09-02 PROBLEM — L27.1 PALMAR-PLANTAR ERYTHRODYSESTHESIA: Status: ACTIVE | Noted: 2021-05-01

## 2021-09-02 PROBLEM — C34.90 LUNG CANCER: Status: ACTIVE | Noted: 2020-09-28

## 2021-09-02 PROBLEM — M25.511 RIGHT SHOULDER PAIN: Status: ACTIVE | Noted: 2020-11-16

## 2021-09-02 PROBLEM — I26.99 PULMONARY EMBOLISM WITHOUT ACUTE COR PULMONALE: Status: ACTIVE | Noted: 2019-09-20

## 2021-09-02 PROBLEM — R39.15 URINARY URGENCY: Status: ACTIVE | Noted: 2020-03-01

## 2021-09-02 PROBLEM — D75.89 MACROCYTOSIS: Status: ACTIVE | Noted: 2021-07-01

## 2021-09-02 PROCEDURE — 25010000002 HEPARIN LOCK FLUSH PER 10 UNITS: Performed by: INTERNAL MEDICINE

## 2021-09-02 RX ORDER — HEPARIN SODIUM (PORCINE) LOCK FLUSH IV SOLN 100 UNIT/ML 100 UNIT/ML
500 SOLUTION INTRAVENOUS AS NEEDED
Status: CANCELLED | OUTPATIENT
Start: 2021-09-02

## 2021-09-02 RX ORDER — SODIUM CHLORIDE 0.9 % (FLUSH) 0.9 %
10 SYRINGE (ML) INJECTION AS NEEDED
Status: CANCELLED | OUTPATIENT
Start: 2021-09-02

## 2021-09-02 RX ORDER — HEPARIN SODIUM (PORCINE) LOCK FLUSH IV SOLN 100 UNIT/ML 100 UNIT/ML
500 SOLUTION INTRAVENOUS AS NEEDED
Status: DISCONTINUED | OUTPATIENT
Start: 2021-09-02 | End: 2021-09-02 | Stop reason: HOSPADM

## 2021-09-02 RX ORDER — SODIUM CHLORIDE 0.9 % (FLUSH) 0.9 %
10 SYRINGE (ML) INJECTION AS NEEDED
Status: DISCONTINUED | OUTPATIENT
Start: 2021-09-02 | End: 2021-09-02 | Stop reason: HOSPADM

## 2021-09-02 RX ADMIN — SODIUM CHLORIDE, PRESERVATIVE FREE 10 ML: 5 INJECTION INTRAVENOUS at 14:14

## 2021-09-02 RX ADMIN — Medication 500 UNITS: at 14:14

## 2021-09-09 RX ORDER — LISINOPRIL 10 MG/1
TABLET ORAL
Qty: 30 TABLET | Refills: 1 | OUTPATIENT
Start: 2021-09-09

## 2021-09-09 NOTE — TELEPHONE ENCOUNTER
Lisinopril refill request rec electronically from [facility]. On the last rx sent 3 weeks ago-there was 1 refill provided. Pt last filled on 8/17/2021. Request denied-new rx requested too soon.

## 2021-09-13 DIAGNOSIS — C20 ADENOCARCINOMA OF RECTUM (HCC): Primary | ICD-10-CM

## 2021-09-13 NOTE — PROGRESS NOTES
REASON FOR FOLLOW UP:     1. Rectal cancer, rectal ultrasound examination in July 2019 reported T3N0 disease without lymphadenopathy. She does have small pulmonary nodule 6-7 mm and 2 mm with indeterminate feature. There is no solid evidence of metastatic disease otherwise. Patient has stage IIA (T3N0M0) disease.  2. The patient is heterozygous factor V Leiden, was on prophylactic anticoagulation with Lovenox 40 mg daily given her increased risk of thrombosis with MediPort and GI malignancy.   3. PET scan on 8/8/2019 reported an 8 mm hypermetabolic right upper lobe pulmonary nodule, which is suspicious for metastatic as well.    4. Patient had a PowerPort placement on the left upper chest by Dr. Joseph on 8/9/2019.  5. Patient was started on concurrent infusional 5-FU chemoradiation therapy on 8/12/2019, with planned complete surgical resection and further adjuvant chemotherapy with intention to cure the disease.   6. Patient underwent surgical resection of the primary rectal cancer by Dr. Ye on 12/2/2019.  Pathology evaluation reported residual T3N1 disease stage IIIb.  7. Diarrhea related to therapy and radiation.   8. Patient was started cycle 1 day 1 of adjuvant FOLFOX 6 on 1/23/2020.  9. On 2/5/2020 FOLFOX 6 cycle 2 delayed secondary to neutropenia.  Patient was given 3 days of Granix injection.  After cycle #2 we planned 3 days of Granix with each cycle.   10. Patient also intolerant of oral iron.  Patient received 2 doses of IV injectafer on 02/05/2020 and 02/12/2020.   11. 02/12/2020 Proceed with cycle #2 of FOLFOX 6 with 3 days of Granix.  12. On 3/11/2020 cycle 4 postponed secondary to abdominal pain and occasional low-grade fevers.  CT scans ordered.  13. Cycle #4 resumed after CT scan revealed no evidence of disease.  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 Ye. patient has no fever.  Continue to observe.   14. Grade 3  hand-foot syndrome secondary to 5-FU after cycle #7 FOLFOX 6 chemotherapy, prompting ER visit.  Also has worsening peripheral neuropathy.   15. Cycle #8 FOLFOX 6 was given on 5/13/2020, with 50% dose reduction for both 5-FU and oxaliplatin, and also examination of bolus 5-FU.   16. PET scan examination on 5/21/2020 reported no evidence of metastatic disease in the chest abdomen pelvis.  Stable 6 mm RUL pulmonary nodule.  17. On 5/27/2020, I discussed with the patient that we can discontinue chemotherapy at this time.   18. Patient had a surgical procedure for low anterior colon resection, coloanal anastomosis on 8/3/2020.  19. CT scan for chest abdomen pelvis on 9/9/2020 reported a new 8 mm noncalcified pulmonary nodule in the left lower lobe of the lung.  Otherwise stable right upper lobe 6 mm pulmonary nodule, and no evidence of disease recurrence in the abdomen or pelvis.  20. PET scan examination on 9/18/2020 reported multiple hypermetabolic small pulmonary nodules/ new pulmonary nodules.   21. Patient was started on pelvic chemotherapy with FOLFIRI regimen on 10/13/2020.   22. 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 It reported tumor mutation burden 5 Muts/Mb, microsatellite stable, TP53 mutation R282W, and others.   23. Cycle #5 was without irinotecan, due to peripheral neuropathy.  24. 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  Patient was switched to weight-based Lovenox injection.  25. Cycle #6 5-FU and irinotecan was delayed by 2 weeks because of the above incident.  26. Patient had a telemedicine evaluation at that the [facility] [place] in February 2021.  They agreed with our treatment plan for palliative chemotherapy followed by maintenance chemotherapy.    27. PET scan examination on 4/6/2021 after cycle #12 palliative chemotherapy reported no  evidence of hypermetabolic metastatic lesion.   28. Patient was started on maintenance chemotherapy with 5-FU and Avastin on 4/13/2021. (Unable to tolerate Xeloda because of a significant hand-foot syndrome).       HISTORY OF PRESENT ILLNESS:  The patient is a 41 y.o. female the above-mentioned history who is here today for lab review and evaluation prior to her maintenance chemotherapy with 5-FU, leucovorin, Avastin.  She continues to tolerate treatment well overall with counts that are stable.    As she is seen back today she is more anxious, noting that they had difficulty getting blood from her Mediport and she is worrying about upcoming scans next week.  Her heart rate is in the 140s currently.  She does have Klonopin with her and I encouraged her to go ahead and take 1 tablet here in the office.    When I saw her 2 weeks ago she was noting a small open area in the lower abdomen just above the ostomy that had opened up upon her twisting in her chair.  We discussed how to clean this and as she is reviewed back today the area has for the most part closed up.  She has a small scab remaining.    Patient was previously having some difficulty with her blood pressure being regulated.  We did try to give her lisinopril however this caused hypotension and the patient to feel extremely weak.  She has been holding lisinopril and her blood pressure has been stable/acceptable with systolics in the 120s-130s.    Patient does continue with cyclical hand-foot symptoms presenting around 24 hours after she is unhooked from the 5-FU infusional ball.  This specifically leads to peeling of her hands and feet with the hands being more involved.  She does have chronic darkening of the skin.  All this is tolerable and manageable per her report.    She denies other concerns this time.    Past Medical History:   Diagnosis Date   • Abdominopelvic abscess (CMS/HCC) 08/12/2020    ADMITTED TO [facility]   • Abnormal Pap smear of cervix 02/02/1998     JULIUS I   • Anemia in pregnancy    • Anxiety    • Bilateral epiphora 04/2020   • Bilateral hand swelling 05/02/2020    SEEN AT [facility] ER   • Cervical lymphadenitis 06/06/2012    SEEN AT [facility] ER   • Chemotherapy induced diarrhea 01/2021   • Chemotherapy induced neutropenia (CMS/HCC) 04/2020   • Chemotherapy-induced nausea 02/2020   • Chemotherapy-induced thrombocytopenia 05/2020   • Chronic diarrhea    • Colon polyps     FOLLOWED BY DR. GERONIMO ESPARZA   • Cystitis 04/24/2020    WITH DEHYDRATION, ADMITTED TO [facility]   • Cystitis 10/27/2020   • Depression    • Drug-induced peripheral neuropathy (CMS/HCC) 05/2020   • Factor V Leiden mutation (CMS/HCC)    • Fistula of intestine    • GERD (gastroesophageal reflux disease)    • Hand foot syndrome 05/2020   • Heart murmur     IN CHILDHOOD   • Hematochezia    • Hemorrhoids    • Heterozygous factor V Leiden mutation (CMS/HCC)     DX 8-2-2019   • History of anemia    • History of chemotherapy 2019    FOLLOWED BY DR. ALEXANDRU HUNT   • History of gestational diabetes    • History of pre-eclampsia 1998   • History of radiation therapy 2019    FOLLOWED BY DR. JAVON LEWIS   • History of TB skin testing 01/17/2009    TB Skin Test   • HPV in female 1998   • Hypokalemia 09/2019   • Hypomagnesemia 09/2019   • Hyponatremia 06/2021   • IBS (irritable bowel syndrome)    • Ileostomy in place (CMS/HCC)     FOLLOWED BY DR. 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   • Perirectal abscess 08/12/2020   • Pulmonary embolism without acute cor pulmonale (CMS/HCC) 09/20/2019    X 3; ADMITTED TO [facility]   • Pulmonary nodule, right 05/2020   • Rectal cancer (CMS/HCC) 07/08/2019     STAGE IIA, INVASIVE MODERATELY DIFFERENTIATED ADENOCARCINOMA, CHEMO AND XRT FINISHED 2019   • Right shoulder pain 2020    SEEN AT [facility] ER   • Right ureteral stone 10/01/2019    SEEN AT [facility] ER   • SAB (spontaneous ) 1996     A2-1 INDUCED   • Sciatica    • Sepsis due to cellulitis (CMS/HCC) 2002    LEFT GREAT TOE, ADMITTED TO [facility]   • Tachycardia 2020   • Thrombosis of superior vena cava (CMS/HCC) 2020    AND BRACHIOCEPHALIC VEIN, ADMITTED TO [facility]   • Urinary urgency 2020     Past Surgical History:   Procedure Laterality Date   • ABDOMINAL SURGERY     • CHOLECYSTECTOMY N/A 10/10/2003    LAPAROSCOPIC WITH CHOLANGIOGRAM, DR. JAMEY TALAVERA AT [facility]   • COLON RESECTION N/A 2019    Procedure: laparoscopic low anterior colon resection with mobilization of splenic flexure and diverting loop ileostomy: ERAS;  Surgeon: Padmaja Esparza MD;  Location: [facility];  Service: General   • COLON RESECTION N/A 8/3/2020    Procedure: LOW ANTERIOR COLON RESECTION, COLOANAL ANASTOMOSIS, MOBILIZATION SPLENIC FLEXURE;  Surgeon: Padmaja Esparza MD;  Location: [facility];  Service: General;  Laterality: N/A;   • COLONOSCOPY N/A 7/15/2020    PATENT ANASTAMOSIS IN MID RECTUM, RESCOPE IN 1 YR, DR. PADMAJA ESPARZA AT [facility]   • COLONOSCOPY W/ POLYPECTOMY N/A 2019    15 MM TUBULOVILLOUS ADENOMA POLYP IN HEPATIC FLEXURE, 20 MMTUBULOVILLOUS ADENOMA WITH HIGH GRADE DYSPLASIA IN RECTOSIGMOID, 4 CM MASS IN MID RECTUM, PATH: INVASIVE MODERATELY DIFFERENTIATED ADENOCARCINOMA, DR. JENNIFER LI AT [facility]   • DILATATION AND EVACUATION N/A    • ENDOSCOPY N/A 2019    LA GRADE A ESOPHAGITIS, GASTRITIS, ALL BIOPSIES BENIGN, DR. JENNIFER LI AT [facility]   • INCISION AND DRAINAGE PERIRECTAL ABSCESS N/A 2020    Procedure: INCISION AND DRAINAGE OF retrorectal dehiscence abcess with drain placement and irrigation;  Surgeon: Padmaja Esparza MD;  Location: [facility]  MAIN OR;  Service: General;  Laterality: N/A;   • PAP SMEAR N/A 01/24/2014   • SIGMOIDOSCOPY N/A 7/24/2019    INFILTRATIVE PARTIALLY OBSTRUCTING LARGE RECTAL CANCER, AREA TATOOED, DR. GERONIMO YE AT [facility]   • SIGMOIDOSCOPY N/A 11/23/2019    AN ULCERATED PARTIALLY OBSTRUCTING MASS IN MID RECTUM, AREA TATTOOED, DR. GERONIMO YE AT [facility]   • SIGMOIDOSCOPY N/A 7/22/2021    PATENT ANASTAMOSIS IN DISTAL RECTUM, RESCOPE IN 1 YR, DR. GERONIMO YE AT [facility]   • TRANSRECTAL ULTRASOUND N/A 7/24/2019    Procedure: ULTRASOUND TRANSRECTAL;  Surgeon: Geronimo Ye MD;  Location: [facility] ENDOSCOPY;  Service: General   • URETEROSCOPY LASER LITHOTRIPSY WITH STENT INSERTION Right 10/30/2019    DR. ESTUARDO BEASLEY AT [place]   • VAGINAL DELIVERY N/A 09/18/1998   • VENOUS ACCESS DEVICE (PORT) INSERTION Left 8/9/2019    Procedure: INSERTION VENOUS ACCESS DEVICE;  Surgeon: Sj Joseph MD;  [facility];  Service: General   • VENOUS ACCESS DEVICE (PORT) INSERTION N/A 12/18/2020    Procedure: INSERTION VENOUS ACCESS DEVICE right side, removal venous access device left side;  Surgeon: Sj Joseph MD;  [facility];  Service: General;  Laterality: N/A;   • WISDOM TOOTH EXTRACTION Bilateral 1993       HEMATOLOGIC/ONCOLOGIC HISTORY:      The patient reports she has intermittent rectal bleeding and urgency, mucous with her stool, starting sometime in 2016. At that time she was referred to GI service, and was diagnosed of irritable bowel syndrome. Nevertheless she had worsening urgency for bowel movement, and had a couple of incidents losing control of stool. She was recently seen by Roland Thorpe MD again and had colonoscopy and EGD exam on 07/08/2019. She was found having a circumferential rectal mass. According to the procedure note, the patient had a fungating circumferential bleeding 4 cm mass in the middle rectum, at distance between 13 cm and 17 cm from the anus. Mass was causing partial obstruction. There were  also colon polyps found at the hepatic flexure and also at the rectosigmoid junction 23 cm from the anus. Both were resected and retrieved. EGD examination reported grade A esophagitis at the GE junction and patchy discontinuous erythema and aggravation of the mucosa without bleeding in the stomach body. There is normal mucosa of the duodenum. Pathology evaluation from this procedure reported moderately differentiated adenocarcinoma involving the rectal mass. The rectal sigmoid junction polyp was tubular/tubulovillous adenoma with high grade dysplasia negative for invasive malignancy. The hepatic flexure polyp was also tubular/tubulovillous adenoma negative for high grade dysplasia or malignancy. The biopsy from the esophagus reports squamocolumnar mucosa with inflammatory changes suggestive of mild reflux esophagitis, negative for interstitial metaplasia. Gastric biopsy was benign and duodenal biopsy was also benign. There is no mention of H-pylori.     Patient was subsequently referred to colorectal surgeon Padmaja Ye MD for further evaluation. The patient had CT scan examination for chest, abdomen and pelvis requested by Dr. Ye and were done on 07/13/2019. The study reported no evidence of lymphadenopathy in the abdomen and pelvis. There was wall thickening of the rectosigmoid junction. Normal spleen, pancreas, adrenal glands and kidneys. There was fatty liver infiltration but no focal lymphatic lesions. There was a small 6-7 mm noncalcified nodule in the right upper lobe and a tiny 3 mm subpleural nodule in the right middle lobe. No mediastinal or hilar lymphadenopathy.     Dr. Ye performed staging rectal ultrasound examination. This study reported tumor penetrating through the muscularis propria as T3 disease; there were no lymph nodes identified.    She had a hospitalization in late September 2019 because of newly discovered pulmonary emboli.  She was on prophylactic Lovenox prior to the incident of  PE.  Now she is on full dose Xarelto anticoagulation.  Patient reports no further chest pain dyspnea.  She denies bleeding or bruising.  During her hospitalization, she was seen by Dr. Ye, who plans to have surgery 8 to 12 weeks after finishing radiation therapy.  She finished her radiation on 9/19/2019.     I noticed patient also presented to the emergency room on 10/1/2019 complaining of right flank area pain.  I reviewed the images studies and indeed she has a very small 1 to 2 mm obstructing kidney stone in the UV junction.  Patient is still symptomatic with some pains and dysuria.  She denies fever sweating or chills.    Laboratory study on 10/7/2019 reported normal CBC including hemoglobin 13.1, platelets 301,000, WBC 6170 and ANC 4900 lymphocytes 590 monocytes 480.      The nurse reported malfunction of port-a-catheter on 10/7/2019.  Port study on 10/8/2019 showed fibrin sheath around the distal aspect of the Mediport catheter in the SVC. This does not appear to hinder injection, but does prevent aspiration at this time.    Patient underwent surgical resection of the colon on 12/2/2019 with Dr. Ye.  Pathology evaluation reported residual T3 disease, also 1 out of 14 lymph nodes positive for malignancy.  So this patient in either had at least stage IIIb disease (T3 N1 M0?).      Adjuvant chemotherapy FOLFOX 6 will be started on 1/22/2020.  Laboratory study reported iron saturation 10%, free iron 31 TIBC 319 and ferritin 168.  Her hemoglobin was 11.8, WBC 5600, and platelets 347,000.    Patient was here on 02/12/2020 for cycle 2 of her FOLFOX.  This is delayed x1 week secondary to neutropenia.  The patient ultimately received 3 days worth of Neupogen with recovery of her blood counts.  Of note, the patient struggled with significant nausea without any episodes of vomiting following cycle #1 of chemotherapy resulting in significant weight loss.  She is up 12 pounds over the last week as her appetite has  normalized.  We will add Emend to her care plan.    She is having several loose stools per her colostomy and has been hesitant to take Imodium due to prior history of constipation.  I encouraged her to try this with a maximum of 8 tablets/day.  She denies any infectious symptoms including fevers or chills.  No excess bleeding or bruising noted.  She had the expected cold sensitivity related to the Oxaliplatin for about 3 days following treatment.    Labs from 02/12/2020 demonstrated total white blood cell count of 5.14, ANC of 3.06, hemoglobin of 11.2, platelets of 211,000.  She was found to be iron deficient last week and is intolerant to oral iron secondary to GI distress.  For this reason, she initiated IV iron therapy with Injectafer last week.  She had received her second dose 02/12/2020    Patient has normalized hemoglobin 12.2 on 2/26/2020.    She reported on 5/5/2020 she had a recent visit to the emergency department for what was suspected to be an allergic reaction.  She called our on-call service on Saturday with reports of hand and face swelling.  She was instructed to proceed to the emergency department at which time she was assessed and prescribed a Medrol Dosepak.  She had just completed her 5-FU and was unhooked on Friday, 5/3/2020.  Her symptoms have improved.  She does report persistent hyperpigmentation and mild swelling of the palms of the hands but this is much improved.  It was her right hand specifically that was swollen.  Her facial swelling has resolved.  She continues on Cefdinir nd since has 1 day remaining, she was prescribed cefdinir for a UTI requiring hospitalization at the end of April.  Reports no new symptoms.  Her labs are stable.  She is scheduled for treatment again.    Patient states at this time she is not tolerating her chemotherapy well.     Patient seen in the emergency department on 5/2/2020 for what was suspected to be an allergic reaction.  She called our on-call service  on Saturday explaining that she was experiencing hand and face swelling.  She was instructed to proceed to the emergency department at which time she was assessed and prescribed a Medrol Dosepak.  She had just completed her 5-FU and was unhooked on Friday, 5/1/2020.      She reports since her ED visit on 5/2/2020 her symptoms have not improved. Her hands and feet were swollen upon presenting to the ED and she could barely make a fist. She states that she feels so swollen she is not able to stand it. She states that her skin on the hands and feet are peeling extensively as well, besides changing color to more dark.     She also reports significant fatigue after her first week of the 5-FU treatment but she expected this side effect. She also notices significant increase in her neuropathy to the point that she is not able to even walk around in her home without her house slippers due to irritation from her rugs. She denies and associated nausea or vomiting at this time. She also has episodes of epistaxis every day after the chemotherapy cycle #7.  She does report working full-time during the week of chemotherapy.    Laboratory studies, 5/13/2020, show moderate thrombocytopenia platelets 123,000, low normal WBC 4140 including ANC 2720 and normal hemoglobin 13.4.  Because significant hand-foot syndrome, will decrease both 5-FU and oxaliplatin by 50%, and eliminate bolus dose of 5-FU.    On 5/21/2020 patient had a PET scan performed which showed no convincing evidence of residual disease in abdomen or pelvis, no metastatic disease within the chest or neck.     Cycle #8 FOLFOX 6 was given on 5/13/2020, with 50% dose reduction for both 5-FU and oxaliplatin, and also examination of bolus 5-FU.  She states on 5/27/2020 that with the recent reduction of the chemotherapy she feels significantly better. She has more energy and is able to do her daily routine.      PET scan examination on 5/21/2020 reported no evidence of  metastatic disease in chest abdomen pelvis.  There was a stable 6 mm right upper lobe pulmonary nodule.    Laboratory studies on 5/27/2020 showed mild leukocytopenia WBC 3720 but a normal ANC 2250 and lymphocytes 630.  Normal platelets 163,000 and hemoglobin 12.6.  Chemistry lab reported normal renal function, liver function panel and a electrolytes, elevated glucose 164.    Laboratory studies 6/24/2020, showed normal hemoglobin 13.4 but macrocytosis .9.  Normal platelets 210,000 and WBC 5870.  She had normal CMP.     Patient last time was here in late June 2020.  Since that time she had surgical procedure for low anterior colon resection, coloanal anastomosis on 8/3/2020.  She later developed a perirectal abscess, required surgical drainage on 8/14/2020.  She was discharged on 8/27/2020.    Patient reports to me she still has lower abdominal wall vacuum suction in place.  She denies fever sweating chills.  Performance status is ECOG 1.  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  She does have visiting nurse come to the home for wound care.    CT scan for chest abdomen pelvis on 9/9/2020 reported a new 8 mm noncalcified pulmonary nodule in the left lower lobe.  Otherwise stable right upper lobe 6 mm pulmonary nodule, and no evidence of disease recurrence in the abdomen or pelvis.     Laboratory study on 9/16/2020 reported elevated AST 55, ALT 95, alk phosphatase 143 but normal total bilirubin 0.3.  Chemistry lab otherwise unremarkable.  She has completed normal CBC.      Because of the abnormal CT scan examination for chest abdomen pelvis on 9/9/2020 reported a new 8 mm noncalcified pulmonary nodule in the left lower lobe, we obtained a PET scan examination for further evaluation, which was done on 9/18/2020.  This study reported several pulmonary nodules, some of them are hypermetabolic, newly developed compared to previous PET scan in May 2020.  Certainly does highly suspicious  for metastatic disease.  There are also hypermetabolic activity in the abdomen and pelvic area which is hard to differentiate from surgical scar versus metastatic malignancy.    Laboratory study on 10/13/2020 reported normal CBC with Hb 13.9, platelets 302,000 and WBC 5520 including ANC 3830.  Chemistry lab reported normalized AST 20, alk phosphatase 116 improved ALT 35, and maintains normal bilirubin, with normal electrolytes and liver function panel.     Patient was started on first cycle of palliative chemotherapy FOLFIRI on 10/13/2020.    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  Patient had a new port placed 12/18/2020, and had held her Xarelto for 2 days prior to surgery.  She presented to the ER on 12/21/20 with complaints of facial and arm swelling for 3 days.  She also noted increased shortness of breath.  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  Thrombus within the right internal jugular vein cannot be excluded.  Patient was started on IV heparin, and eventually transitioned to weight-based Lovenox, which she now continues.      MEDICATIONS    Current Outpatient Medications:   •  clonazePAM (KlonoPIN) 1 MG tablet, TAKE 1 TABLET BY MOUTH THREE TIMES A DAY AS NEEDED FOR ANXIETY OR SEIZURES, Disp: 90 tablet, Rfl: 0  •  Cyanocobalamin (VITAMIN B-12 PO), Take  by mouth., Disp: , Rfl:   •  dicyclomine (BENTYL) 20 MG tablet, TAKE 1 TABLET BY MOUTH EVERY 6 HOURS AS NEEDED, Disp: 360 tablet, Rfl: 0  •  diphenoxylate-atropine (LOMOTIL) 2.5-0.025 MG per tablet, Take 1 tablet by mouth 4 (Four) Times a Day As Needed for Diarrhea., Disp: 120 tablet, Rfl: 1  •  enoxaparin (LOVENOX) 100 MG/ML solution syringe, Inject 0.9 mL under the skin into the appropriate area as directed Every 12 (Twelve) Hours., Disp: 60 mL, Rfl: 2  •   escitalopram (LEXAPRO) 10 MG tablet, TAKE 1 TABLET BY MOUTH EVERY DAY, Disp: 30 tablet, Rfl: 5  •  famotidine (PEPCID) 20 MG tablet, TAKE 1 TABLET BY MOUTH TWICE A DAY, Disp: 180 tablet, Rfl: 1  •  fluorouracil in dextrose 5 % 250 mL infusion, Infuse  into a venous catheter 1 (One) Time. Takes twice a month., Disp: , Rfl:   •  lisinopril (PRINIVIL,ZESTRIL) 10 MG tablet, Take 1 tablet by mouth Daily., Disp: 30 tablet, Rfl: 1  •  Loratadine (CLARITIN) 10 MG capsule, Take 10 mg by mouth Every Evening., Disp: , Rfl:   •  mineral oil-hydrophilic petrolatum (AQUAPHOR) ointment, Apply 1 application topically to the appropriate area as directed As Needed for Dry Skin., Disp: , Rfl:   •  nystatin (MYCOSTATIN) 024663 UNIT/GM cream, Apply  topically to the appropriate area as directed 2 (Two) Times a Day As Needed (rash)., Disp: 30 g, Rfl: 2  •  ondansetron (ZOFRAN) 8 MG tablet, Take 1 tablet by mouth 3 (Three) Times a Day As Needed for Nausea or Vomiting., Disp: 60 tablet, Rfl: 5  •  prochlorperazine (COMPAZINE) 10 MG tablet, Take 1 tablet by mouth Every 6 (Six) Hours As Needed for Nausea or Vomiting., Disp: 30 tablet, Rfl: 2  •  promethazine (PHENERGAN) 25 MG tablet, Take 1 tablet by mouth Every 6 (Six) Hours As Needed for Nausea or Vomiting., Disp: 60 tablet, Rfl: 2  No current facility-administered medications for this visit.    Facility-Administered Medications Ordered in Other Visits:   •  fluorouracil (ADRUCIL) 4,850 mg, sodium chloride 0.9 % 143 mL 240 mL chemo infusion - FOR HOME USE, 2,400 mg/m2 (Treatment Plan Recorded), Intravenous, Once, Gaby Cruz APRN  •  leucovorin 810 mg in sodium chloride 0.9 % 290.5 mL IVPB, 400 mg/m2 (Treatment Plan Recorded), Intravenous, Once, Gaby Cruz APRN, Last Rate: 630 mL/hr at 09/14/21 1220, 810 mg at 09/14/21 1220    ALLERGIES:   No Known Allergies    SOCIAL HISTORY:       Social History     Tobacco Use   • Smoking status: Current Every Day Smoker      Packs/day: 1.00     Years: 24.00     Pack years: 24.00     Types: Electronic Cigarette, Cigarettes   • Smokeless tobacco: Never Used   Vaping Use   • Vaping Use: Some days   • Substances: Nicotine   • Devices: Disposable   Substance Use Topics   • Alcohol use: Not Currently     Comment: Caffeine use rare   • Drug use: No         FAMILY HISTORY:   Mother has positive factor V Leiden mutation, although she did not have thrombosis, mother also is coronary disease with stenting, she also is occasional bruising.  Maternal grandmother had DVT, she had multiple surgical procedures.  Patient mother's half-brother had metastatic colon cancer at diagnosis in his 50s.  Maternal great aunt has breast cancer.  Patient will follow his skin cancer in his 60s, exclusive.    Review of Systems   Constitutional: Negative for activity change, appetite change, chills, diaphoresis, fatigue, fever and unexpected weight change.        She works full-time in a dental clinic.   HENT: Negative for mouth sores and sore throat.    Eyes: Negative for visual disturbance.   Respiratory: Negative for cough and shortness of breath.    Cardiovascular: Negative for chest pain and leg swelling.   Gastrointestinal: Negative for abdominal distention, abdominal pain, blood in stool and nausea.   Endocrine: Negative for cold intolerance.   Genitourinary: Negative for dysuria and hematuria.   Musculoskeletal: Negative for arthralgias and joint swelling.   Skin: Positive for color change (Hands and feet bilaterally) and wound (abdomen - see HPI).        See HPI   Allergic/Immunologic: Positive for immunocompromised state (Chemotherapy).   Neurological: Negative for dizziness and light-headedness.   Hematological: Negative for adenopathy. Bruises/bleeds easily (Easy bruising at the site of injection of Lovenox ).   Psychiatric/Behavioral: Negative for agitation and confusion.       09/14/2021 ROS unchanged from above except as updated.       Vitals:     09/14/21 1008   BP: 117/92   Pulse: (!) 142   Resp: 18   Temp: 97.1 °F (36.2 °C)   SpO2: 96%   Weight: 84.6 kg (186 lb 6.4 oz)   PainSc: 0-No pain     Current Status 9/14/2021   ECOG score 0     Physical Exam  Vitals reviewed.   Constitutional:       General: She is not in acute distress.     Appearance: Normal appearance. She is well-developed.   HENT:      Head: Normocephalic and atraumatic.   Eyes:      Pupils: Pupils are equal, round, and reactive to light.   Cardiovascular:      Rate and Rhythm: Regular rhythm. Tachycardia present.      Heart sounds: Normal heart sounds. No murmur heard.     Pulmonary:      Effort: Pulmonary effort is normal. No respiratory distress.      Breath sounds: Normal breath sounds. No wheezing.   Abdominal:      General: Bowel sounds are normal. There is no distension.      Palpations: Abdomen is soft.      Tenderness: There is no abdominal tenderness.      Comments: Ostomy in right abdomen.  Scattered bruises on the abdomen bilaterally from Lovenox injections.   Musculoskeletal:      Cervical back: Normal range of motion.      Right lower leg: No edema.      Left lower leg: No edema.   Skin:     General: Skin is warm and dry.      Coloration: Skin is not jaundiced.      Findings: Bruising and lesion (small (<5mm) open area in mid lower abdomen above ostomy, now scabbed over; no drainage) present. No rash.   Neurological:      Mental Status: She is alert and oriented to person, place, and time. Mental status is at baseline.   Psychiatric:         Mood and Affect: Mood normal.         Thought Content: Thought content normal.     09/14/2021 physical exam is unchanged from above except as updated.    RECENT LABS:  Results from last 7 days   Lab Units 09/14/21  0931   WBC 10*3/mm3 5.67   NEUTROS ABS 10*3/mm3 3.64   HEMOGLOBIN g/dL 17.0*   HEMATOCRIT % 52.1*   PLATELETS 10*3/mm3 211       Results from last 7 days   Lab Units 09/14/21  0931   SODIUM mmol/L 136   POTASSIUM mmol/L 5.1*    CHLORIDE mmol/L 98   CO2 mmol/L 26.8   BUN mg/dL 7   CREATININE mg/dL 0.90   CALCIUM mg/dL 9.9   ALBUMIN g/dL 4.10   BILIRUBIN mg/dL 0.4   ALK PHOS U/L 110   ALT (SGPT) U/L 63*   AST (SGOT) U/L 41*   GLUCOSE mg/dL 131*           Assessment/Plan      ASSESSMENT:   1.  Rectal cancer, rectal ultrasound examination reported T3N0 disease without lymphadenopathy.   · CT scan of chest, abdomen and pelvis reported no lymphadenopathy in the abdomen, pelvis or chest. She does have small pulmonary nodule 6-7 mm and 2 mm with indeterminate feature. There is no solid evidence of metastatic disease otherwise.   · Based on the CT scan and rectal ultrasound imaging studies, this patient had stage IIA (T3N0M0) disease.   · She had PET scan examination on 8/8/2019 which reported a hypermetabolic right upper lobe pulmonary nodule 6 mm with SUV 5.6.  This is suspicious for metastatic disease however it is too small to be biopsied.  This patient may have stage IV disease.   · She initiated concurrent radiation with continuous 5-FU on 8/12/2019.  · Patient finished concurrent chemoradiation therapy.  · Patient underwent surgical resection of the rectal tumor and diverting loop ileostomy on 12/2/2019 with Dr. Ye.  Pathology evaluation reported residual T3 disease, with 1 out of 14 lymph nodes positive for malignancy.  Certainly this patient has at least stage IIIb rectal cancer (T3 N1 M0?)  · On 1/22/2020 adjuvant chemotherapy FOLFOX 6 regimen initiated.   · On 2/5/2022 cycle #2 was delayed secondary to neutropenia.  She was given 3 days of Granix.   · Emend added with cycle 3.  With improved nausea control  · Continuing home Granix x3 days following 5-FU disconnect  · 3/11/2020 due for cycle 4, however, she is experiencing progressive abdominal pain and occasional fevers.   · CT scan performed on 3/13/2020 reveals no evidence of progressive or recurrent disease.  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  · Patient hospitalized  4/24-4/26/20 after cycle 5 of chemotherapy with acute UTI.  CT abdomen/pelvis noting fluid collection in the presacral region having diminished in size compared to CT dated 3/13/2020.  Patient was evaluated by Dr. Ye while in the hospital with further plans to evaluate possible colovaginal fistula following completion of chemotherapy.  Patient did respond to IV antibiotics and discharged home on oral cefdinir.  · 4/29/2020 cycle 6 of FOLFOX.  Urinary symptoms have resolved   · Patient seen in the [facility] ED on 5/2/2020 for what was suspected to be an allergic reaction.  She called our service on Saturday explaining that she was experiencing hand and face swelling.  She was instructed to proceed to the emergency department at which time she was assessed and prescribed a Medrol Dosepak.  She had just completed her 5-FU and was unhooked on Friday, 5/1/2020.  Her symptoms have resolved in the office on assessment, 5/5/2020.  · The patient had grade 3 hand-foot syndrome based on symptomology.  Patient had cycle #8 of 5-FU on 5/13/2020. Due to her symptoms and poor tolerance to the 5-FU, her treatment dose will be reduced 50% for oxaliplatin and infusional 5-FU.  Bolus 5-FU will be discontinued..  · On 5/21/2020 patient had a PET scan and it showed no evidence of of metastatic disease in the neck, chest, abdomen or pelvis.  There was fluid accumulation/abscess.   · On 5/27/2020 discussed with the patient that we can discontinue chemotherapy at this time.  She will follow-up with Dr. Ye to discuss a possibility to reverse the ileostomy.  We likely will obtain anther PET scan in 3 to 4 months for reassessment.    · Patient was seen by Dr. Ye on 6/19/2019 for evaluation and to discuss possible take down of her ileostomy.  She is scheduled to have a gastrografin enema on 7/2/2020 to evaluate for a fistula, and then a colonoscopy on 7/15/2020, both done by Dr. Ye.  She states that based on the above imaging  and procedure findings, she may have a more extensive surgery done or just have her ileostomy reversed, which would likely be done in August 2020.  This will all be coordinated under the care of Dr. Ye.     · CT scan from 09/09/2020 and also compared to her previous PET scan examination from 05/21/2020 and also the original PET scan from 08/09/2019.  The original PET scan there is a small right upper lobe pulmonary nodule 6 mm with SUV 5.6. So in the 05/2020 PET scan the nodule is still there but activity seems much less and this most recent CT scan examination also documented the preexisting small pulmonary nodule however there is a new left lower lobe pulmonary nodule 8 mm in size and I shared with the patient today this nodule is suspicious for metastatic disease. Laboratory studies reported minimal CEA level 1.32 on 09/09/2020. Liver function panel was only marginally abnormal with the ALT 45, the remaining is normal. However laboratory study today showed worsening AST 55, ALT 95 and alkaline phosphatase 143 but is still normal, total bilirubin 0.3.   · Recommended to have repeat PET scan examination for assessment because the left lower lobe pulmonary nodule is too small to be biopsied, and if the PET scan reports hypermetabolic lesion, I recommended to have stereotactic radiation therapy. Explained to patient that she is not a really good candidate to have thoracotomy for resection because she still has unhealed wound in the abdomen. Stereotactic radiation therapy will likely be a more feasible choice.   · PET scan examination obtained on 9/18/2020 showed metastatic disease.  It reported a few hypermetabolic pulmonary nodules, and some new small pulmonary nodules, all of them highly suspicious for metastatic disease.  She also has hypermetabolic activity in the abdomen and pelvic area which could be related to scar tissue from her recent surgery versus metastatic disease.  Nevertheless overall picture fits  with metastatic cancer.  · Discussed with the patient on 9/20/2020, we reviewed the PET scan images together, and I recommended to have systematic chemotherapy, I do not think stereotactic reading therapy to the hypermetabolic lesions in the lungs warranted at this time because even those will be treated with reading therapy, she will still need systematic chemotherapy.  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  I did discuss with the patient using anti-EGFR monoclonal antibody versus anti-VEGF monoclonal antibody.     · Palliative chemotherapy cycle#1 FOLFIRI was started on 10/13/2020.  No bolus 5-FU given considering previous poor tolerance.    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  Microsatellite stable, mutation burden 5/Mb.   · Discussed with the patient 10/27/2020, because of the K-[place] mutation, she is not a candidate for anti-EGFR monoclonal antibody such as Erbitux or vectibix.  She could be a candidate for anti-VEGF monoclonal antibody, however because of active wound, she is not a candidate right now at this moment.  · Patient seen in the ED for acute right neck pain on 11/16/2020.  CT angiogram as well as CT of the cervical spine performed with no acute findings but notably one of her pulmonary nodules, left upper lobe, somewhat decreased in size.  Patient given prednisone pack.  Further details below.  · Cycle #6 chemotherapy will be resumed on 1/5/2021.  Started back on original irinotecan.  · PET scan examination obtained on 1/12/2021 after cycle #6 chemotherapy reported improved pulmonary nodule hypermetabolic activity.  Confirmed these are metastatic lesions.  · Patient is evaluated 1/19/2020, will continue cycle #7 FOLFIRI chemotherapy.  However Avastin will be on hold see next section.   · Patient was reevaluated on 2/2/2021, will continue chemotherapy cycle #8 FOLFIRI.  Avastin / biosimilar will be added.    · She talked to  [facility] specialist in mid February 2021, and had recommended palliative chemotherapy without surgical intervention of metastatic lung lesions.   · On 3/2/2021, patient will proceed with cycle #10 FOLFIRI plus bevacizumab.  After 12 cycles, plan to start maintenance treatment.  · 3/16/2021 cycle 11.  Plus bevacizumab.  · 3/30/2021 cycle 12 FOLFIRI plus bevacizumab.  Having issues with worsening nausea despite premeds with dexamethasone, Aloxi, Emend, and Zofran at home.  Patient is requesting a dose of Phenergan, which is helped her in the past.  We will give this to her with treatment today.  · PET scan examination on 4/6/2021 reported no evidence of hypermetabolic metastatic lesion.  · Discussed with the patient on 4/13/2021, we reviewed the PET scan results.  We recommended to switch to maintenance chemotherapy without irinotecan.  We will continue 5-FU and Avastin every 2 weeks.  We discussed possibility of switching to oral Xeloda treatment.  Discussed with the patient for side effects more so with hand-foot syndrome.  Patient is agreeable.   · Xeloda 2000 mg twice daily 7 days on, 7 days off along with Avastin initiated 4/27/2021.  · After only 5 doses of Xeloda significant hand-foot syndrome, Xeloda held. Patient requesting to be transitioned back to infusional 5-FU, as she felt that she tolerated infusional 5-FU without any problem.  The patient will receive 5-FU leucovorin and Avastin every 2 weeks.  Xeloda discontinued.  · 5/25/2021 continued cycle #4 maintenance 5-FU, leucovorin, Avastin tolerating this much better so far, we will continue this. Recommended to have 12 cycles of maintenance chemotherapy, then obtain PET scan for reevaluation.  We could discuss further treatment plan at that time.  · 9/14/2021 patient due today for cycle 12 of additional maintenance 5-FU/leucovorin plus Avastin.  She continues to tolerate treatment well overall.  She is anxious in the office  today with her Mediport not getting blood at first and also with upcoming scans being heavy on her mind.  She was instructed to take one of her Klonopin pills while here to help calm her mind.  Heart rate did improve from 140s down to 112.  Proceed with treatment today as scheduled.    2 .  Pulmonary nodule, hypermetabolic on PET scan.   · Her original PET scan examination from 8/8/2019 reported a small 8 mm right upper apex pulmonary nodule but hypermetabolic SUV 5.1.  That was too small to be biopsied, however there was always a possibility of metastatic disease considering that high activity despite a such a small nodule.  · Her chest CT scan examination from 3/13/2020 reported this shrank to 6 mm.    · PET scan examination on 5/21/2020 reported no metabolic activity at this residual nodule.  This needs to be monitored.    · PET scan examination 9/18/2020 reported couple of hypermetabolic pulmonary nodules, besides a few extra small pulmonary nodules.  Those are highly suspicious for metastatic disease.    · PET scan examination obtained on 1/12/2021 after cycle #6 chemotherapy reported improved a pulmonary nodule hypermetabolic activity.  Confirmed these are metastatic lesions.  · 1/19/2021, patient reports she will see thoracic surgeon tomorrow at the [facility] where she seeks second opinion evaluation, and to see whether she would be a candidate for resection of pulmonary nodules.  I discussed with the patient, she had at least 2 hypermetabolic lesions although they responded to chemotherapy, I do not think she would be a candidate for surgery.   · Thoracic surgeon from [facility] recommended metastectomy and to discontinue chemotherapy afterwards.   · Patient had a third opinion evaluation on 2/11/2021 by telemedicine with [facility] physician, who recommended palliative chemotherapy with no surgical intervention for metastatic pulmonary lesions.  Patient was agreeable with  this strategy.      3.   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  Low-dose Lovenox discontinued and initiated Xarelto 20 mg daily.  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  · Patient held Xarelto for 2 days prior to MediPort removal from the left chest wall and placement of new port in the right chest wall on 12/18/2020.  She resumed Xarelto that evening.  · Progressive swelling in the neck, face, upper extremities prompting hospitalization with CT scan showing thrombosis in the right subclavian vein and SVC.  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  Although she had been off of Xarelto for a few days, clearly Xarelto was not prohibiting progression of her thrombosis after she resumed treatment and there was some evidence to suggest thrombosis had been present at least in the innominate vein on prior scan in September but now appears chronic.  Current acute thrombosis involving SVC and right subclavian.  · Patient was admitted and placed on heparin drip  · Patient was evaluated by vascular surgery and intervention was not recommended for thrombolysis/thrombectomy.  · On 12/22/2020, the patient developed worsening shortness of breath, increasing upper extremity edema consistent with worsening SVC syndrome.  Repeat CT angiogram chest was performed early on 12/23/2020 with findings of stable SVC and brachiocephalic vein thrombosis,  no evidence of pulmonary embolism.  · Symptoms improved and patient was discharged 12/24/2020 on Lovenox 100 mg every 12 hours.    · Returns 12/29/2020 for evaluation continuing Lovenox, overall tolerating it well.  No bleeding issues.  · Improved edema 1/5/2021.  Will continue Lovenox weight-based every 12 hours.  · On 1/19/2021 and 2/2/2021, patient reports improved facial swelling.  She however does have being bruise on her abdomen wall where she does Lovenox injection.  However she does not have a spontaneous epistaxis, gum bleeding or other bleeding.   · On 2/2/2021, we discussed that side effects of Avastin/biosimilar related to thrombosis.  Since this patient already has been on Lovenox, I think the benefits from treatment for her cancer will outweigh that risk of thrombosis, will proceed ahead with Avastin.  I cautioned patient to watch out for signs of worsening thrombosis patient voiced understanding.   · On 3/16/2021, no evidence of worsening DVT, continue Lovenox.  · 5/11/2021 Lovenox dosing will be adjusted due to patient's weight loss.  We discussed she needs 1 mg/kg.  Her syringes are 100 mg syringes she will do 0.9 mL subcu every 12 hours.  · 8/3/2021 Patient continues to have bruising on abdomen from lovenox injections.  Will need to monitor weight and adjust dosing in future if further weight loss.   · Stable condition on 8/17/2021.  Continue to monitor.    4.  This patient also has strong family history for malignancy the patient had appointment with genetic counseling on September 3, 2019.  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    5.  Mild anemia, improved since her surgery.    · She also has a history of iron deficiency.  Iron studies reveal a saturation of just 10%.  She was started on oral iron but was unable to tolerate due to GI side effects.   · Status post Injectafer 2/5/2020 and 2/12/2020   · Improved hemoglobin 13.5 on 1/5/2021.  · 3/16/2020 when hemoglobin now up to 16.2,  hematocrit 47.6.  Patient admits that she has started smoking again, and I have encouraged her to quit.  She denies any snoring or sleep apnea diagnosis.  Patient is not taking oral iron.  We will closely monitor.  · 3/30/2020 hemoglobin 15.7, HCT 47.5.  Patient states that she has cut back significantly on her smoking, although not quit yet.  I have encouraged her to continue working on this.  We will continue to closely monitor.  · Hemoglobin 14.6 on 6/8/2021 at the meantime he has worsening macrocytosis .6.    · Laboratory studies 6/22/2021 reported excellent folate more than 20 ng/mL, however low normal B12 level 357 pg/mL.    · On 7/6/2021, normal hemoglobin 15.8, .9 and HCT 47.2%.  Patient was asked to start oral vitamin B12 at 1000 mcg daily.   · 9/14/2021 hemoglobin 17.0, hematocrit 52.1.  Monitor.    6.  Peripheral neuropathy secondary to chemotherapy.   · This is mild involving bilateral hands and feet as reported on 9/16/2020.   · Will avoid chemotherapy with oxaliplatin.  · Stable mild neuropathy as of 11/10/2020  · Patient reports worsening peripheral neuropathy and cycle #5 chemotherapy on 12/8/2020 with and without irinotecan.   · On 1/5/2021, patient reports improvement of peripheral neuropathy, will add irinotecan back to chemotherapy.  Continue to monitor and adjust medication.  · On 3/2/2021, patient reports no worsening of peripheral neuropathy after restarting irinotecan.   · This remains stable.     7.  History of hand-foot syndrome grade 3.    · It become so significant after cycle #7 FOLFOX treatment.  Discussed with patient on 5/13/2020, will discontinue the bolus 5-FU dose, and also decrease 50% of the infusion dose through the pump.   · We also use Medrol Dosepak for possible recurrent symptomology.  She responded this well.   · Her hand-foot syndrome improved.  · Under current treatment with FOLFIRI, patient not receiving 5-FU bolus.  No recurrent issues.   · Patient will be  switched to maintenance chemotherapy using Xeloda in late April 2021.  · Following 5 doses of Xeloda, significant hand-foot syndrome with pain in the feet, significant erythema.  Xeloda held.  Will be further discussed with Dr. Nguyen to determine if the patient will resume 5-FU versus a dose reduction to Xeloda.  · Aggressive emollient moisturizer use twice daily for the past week and patient reports improvement in the dryness and erythema.  Xeloda will now be discontinued and patient will switch back to 5-FU.  · As of 5/25/2021 dryness and erythema/hyperpigmentation continues to improve.  Xeloda has been discontinued.  · 6/8/2021, patient reports much improved hand-foot syndrome, with remnant pigmentation on palms.  · On 7/6/2021, patient reports and had a some flareup of hand-foot syndrome, however improved after aggressive moisturizing cream and the urea cream.  Overall much tolerated infusional 5-FU compared to Xeloda.    · 7/20/2021 continues to have mild hyperpigmentation of her hands and feet bilaterally, she continues aggressive moisturization with utter balm with urea.  She also reports some soreness of her tailbone, and we discussed that this is likely due to loss of weight and muscle mass.  I advised her to go ahead and apply moisturizer to this area as there is one tiny fissure.  Also recommended using a waffle pad or doughnut to sit on.  · 8/3/2021 patient reports her hand foot symptoms are stable and without worsening.  Continue to monitor.  · Symptoms stable, typically flaring about 24 hours after she is unhooked from her 5-FU infusional ball and then settling down with some mild peeling.    8.  Hyperlacrimation and mild scleral irritation related to 5-FU.  She has been taking steroid eyedrops.  This has improved her hyperlacrimation.  · Not currently an issue.  Continue to monitor with 5-FU.      9.  Abnormal liver function panel.  · Overall LFTs have improved which is mild ongoing elevation of AST  and ALT.  Continue to monitor.      10.  Intermittent leukocytopenia secondary to chemotherapy.   · Not currently an issue.  WBC remains normal with treatment.    11.  Depression.  Patient seen by JULIET Davenport on 11/9/2020.  She was started on mirtazapine.  Lexapro was discontinued.  This has definitely improved her mood with the patient feeling overall much better.  She is sleeping better.  Appetite is improved with her actually eating more than she wants to.    · Condition is stable.  She plans to continue follow-up with supportive oncology.    12.  Intermittent upper abdominal discomfort.  This improves with eating.  After further discussion with the patient she also notes 3 nights of increased reflux when laying down.  We discussed her symptoms could be related to increase stomach acid or potential ulcer.  She is currently taking Pepcid 20 mg daily.  I instructed her to add Prilosec 20 mg daily.   · On 3/16/2021 patient reports some worsening reflux symptoms last week.  She has increased Pepcid to 20 mg twice daily, which did resolve her symptoms.  We discussed she could add Prilosec 20 mg daily as well.   · No complaint today.  Continue to monitor.    13.  Proteinuria: 3/30/2020: Patient is 2+ protein in her urine.  We will continue with Avastin and closely monitor.  No need for 24-hour urine at this time.  · Persistent 2+ proteinuria on 4/13/2021.  continue to monitor.  · 5/25/2021 protein urine only 1+.    · 6/22/2021 persistent 1+ proteinuria.  Continue to monitor.  · 7/6/2021, trace protein.  Continue Avastin treatment and monitoring.  · 8/3/2021 1+ protein, stable from last cycle.   · On 8/17/2021, urine protein 2+.  She also had a 1+ leukocytes.  Patient is not symptomatic such as fever, dysuria or gross hematuria, however urine culture obtained, with >100,000 E. Faecalis. Patient treated with Levaquin.     14.  Tachycardia:   · Patient was seen by Dr. Bustos cardiologist on 4/6/2021.    · Follow up visit 7/22/2021 with plans to repeat evaluation in 3 months  · As noted above patient more tachycardic today in the setting of anxiety.  Improved with Klonopin.    15.  Hypertension.  · /91 at visit 8/17/2021.  She was given clonidine for treatment that day and also prescribed lisinopril 10 mg daily.    · Patient unable to tolerate lisinopril, noting that her blood pressure bottomed out with systolic of barely 80 and feeling very fatigued and wiped out.  She has been checking her blood pressure off the lisinopril with systolics in the 120s to 130s at home and diastolic in the 80s to 90s.   · Blood pressure remained stable with systolic in the 120s-130s.  She will remain off antihypertensives for now.       PLAN:  1. Proceed with cycle #12 maintenance chemotherapy with infusional 5-FU, leucovorin, and Avastin today.    2. Patient will remain off lisinopril for now as outlined above.    3. Continue aggressive moisturizing to hands and feet post chemotherapy  4. Continue Lovenox 1 mg/kg twice daily.  5. Continue oral B12 at 1000 mcg daily.  6. In 1 week patient undergo repeat PET scan.    7. Patient will then see Dr. Nguyen back in 2 weeks for review of PET scan results and to discuss further plan of care.      This patient is on drug therapy requiring intensive monitoring for toxicity.  We did more than just assess side effects of treatment today.       Gaby Cruz, APRN  09/14/21      CC:  WAYNE Worley MD Carrie B. Scharf, MD

## 2021-09-14 ENCOUNTER — INFUSION (OUTPATIENT)
Dept: ONCOLOGY | Facility: HOSPITAL | Age: 41
End: 2021-09-14

## 2021-09-14 ENCOUNTER — OFFICE VISIT (OUTPATIENT)
Dept: ONCOLOGY | Facility: CLINIC | Age: 41
End: 2021-09-14

## 2021-09-14 VITALS
HEART RATE: 142 BPM | RESPIRATION RATE: 18 BRPM | BODY MASS INDEX: 30.68 KG/M2 | SYSTOLIC BLOOD PRESSURE: 117 MMHG | DIASTOLIC BLOOD PRESSURE: 92 MMHG | WEIGHT: 186.4 LBS | TEMPERATURE: 97.1 F | OXYGEN SATURATION: 96 %

## 2021-09-14 VITALS — HEART RATE: 112 BPM

## 2021-09-14 DIAGNOSIS — L27.1 HAND FOOT SYNDROME: Chronic | ICD-10-CM

## 2021-09-14 DIAGNOSIS — C20 ADENOCARCINOMA OF RECTUM (HCC): ICD-10-CM

## 2021-09-14 DIAGNOSIS — C18.9 MALIGNANT NEOPLASM OF COLON, UNSPECIFIED PART OF COLON (HCC): Primary | Chronic | ICD-10-CM

## 2021-09-14 DIAGNOSIS — C20 ADENOCARCINOMA OF RECTUM (HCC): Primary | ICD-10-CM

## 2021-09-14 DIAGNOSIS — Z79.899 HIGH RISK MEDICATION USE: ICD-10-CM

## 2021-09-14 DIAGNOSIS — F41.9 ANXIETY: Chronic | ICD-10-CM

## 2021-09-14 LAB
ALBUMIN SERPL-MCNC: 4.1 G/DL (ref 3.5–5.2)
ALBUMIN/GLOB SERPL: 1.2 G/DL (ref 1.1–2.4)
ALP SERPL-CCNC: 110 U/L (ref 38–116)
ALT SERPL W P-5'-P-CCNC: 63 U/L (ref 0–33)
ANION GAP SERPL CALCULATED.3IONS-SCNC: 11.2 MMOL/L (ref 5–15)
AST SERPL-CCNC: 41 U/L (ref 0–32)
BASOPHILS # BLD AUTO: 0.02 10*3/MM3 (ref 0–0.2)
BASOPHILS NFR BLD AUTO: 0.4 % (ref 0–1.5)
BILIRUB SERPL-MCNC: 0.4 MG/DL (ref 0.2–1.2)
BILIRUB UR QL STRIP: NEGATIVE
BUN SERPL-MCNC: 7 MG/DL (ref 6–20)
BUN/CREAT SERPL: 7.8 (ref 7.3–30)
CALCIUM SPEC-SCNC: 9.9 MG/DL (ref 8.5–10.2)
CHLORIDE SERPL-SCNC: 98 MMOL/L (ref 98–107)
CLARITY UR: CLEAR
CO2 SERPL-SCNC: 26.8 MMOL/L (ref 22–29)
COLOR UR: YELLOW
CREAT SERPL-MCNC: 0.9 MG/DL (ref 0.6–1.1)
DEPRECATED RDW RBC AUTO: 59.6 FL (ref 37–54)
EOSINOPHIL # BLD AUTO: 0.26 10*3/MM3 (ref 0–0.4)
EOSINOPHIL NFR BLD AUTO: 4.6 % (ref 0.3–6.2)
ERYTHROCYTE [DISTWIDTH] IN BLOOD BY AUTOMATED COUNT: 15.1 % (ref 12.3–15.4)
GFR SERPL CREATININE-BSD FRML MDRD: 69 ML/MIN/1.73
GLOBULIN UR ELPH-MCNC: 3.3 GM/DL (ref 1.8–3.5)
GLUCOSE SERPL-MCNC: 131 MG/DL (ref 74–124)
GLUCOSE UR STRIP-MCNC: NEGATIVE MG/DL
HCT VFR BLD AUTO: 52.1 % (ref 34–46.6)
HGB BLD-MCNC: 17 G/DL (ref 12–15.9)
HGB UR QL STRIP.AUTO: NEGATIVE
IMM GRANULOCYTES # BLD AUTO: 0.01 10*3/MM3 (ref 0–0.05)
IMM GRANULOCYTES NFR BLD AUTO: 0.2 % (ref 0–0.5)
KETONES UR QL STRIP: NEGATIVE
LEUKOCYTE ESTERASE UR QL STRIP.AUTO: ABNORMAL
LYMPHOCYTES # BLD AUTO: 1.23 10*3/MM3 (ref 0.7–3.1)
LYMPHOCYTES NFR BLD AUTO: 21.7 % (ref 19.6–45.3)
MCH RBC QN AUTO: 34.7 PG (ref 26.6–33)
MCHC RBC AUTO-ENTMCNC: 32.6 G/DL (ref 31.5–35.7)
MCV RBC AUTO: 106.3 FL (ref 79–97)
MONOCYTES # BLD AUTO: 0.51 10*3/MM3 (ref 0.1–0.9)
MONOCYTES NFR BLD AUTO: 9 % (ref 5–12)
NEUTROPHILS NFR BLD AUTO: 3.64 10*3/MM3 (ref 1.7–7)
NEUTROPHILS NFR BLD AUTO: 64.1 % (ref 42.7–76)
NITRITE UR QL STRIP: NEGATIVE
NRBC BLD AUTO-RTO: 0.4 /100 WBC (ref 0–0.2)
PH UR STRIP.AUTO: 6 [PH] (ref 4.5–8)
PLATELET # BLD AUTO: 211 10*3/MM3 (ref 140–450)
PMV BLD AUTO: 9.3 FL (ref 6–12)
POTASSIUM SERPL-SCNC: 5.1 MMOL/L (ref 3.5–4.7)
PROT SERPL-MCNC: 7.4 G/DL (ref 6.3–8)
PROT UR QL STRIP: ABNORMAL
RBC # BLD AUTO: 4.9 10*6/MM3 (ref 3.77–5.28)
SODIUM SERPL-SCNC: 136 MMOL/L (ref 134–145)
SP GR UR STRIP: 1.02 (ref 1–1.03)
UROBILINOGEN UR QL STRIP: ABNORMAL
WBC # BLD AUTO: 5.67 10*3/MM3 (ref 3.4–10.8)

## 2021-09-14 PROCEDURE — 25010000002 ALTEPLASE 2 MG RECONSTITUTED SOLUTION: Performed by: INTERNAL MEDICINE

## 2021-09-14 PROCEDURE — 81003 URINALYSIS AUTO W/O SCOPE: CPT

## 2021-09-14 PROCEDURE — 96413 CHEMO IV INFUSION 1 HR: CPT

## 2021-09-14 PROCEDURE — 96367 TX/PROPH/DG ADDL SEQ IV INF: CPT

## 2021-09-14 PROCEDURE — 80053 COMPREHEN METABOLIC PANEL: CPT

## 2021-09-14 PROCEDURE — 25010000002 PALONOSETRON PER 25 MCG: Performed by: INTERNAL MEDICINE

## 2021-09-14 PROCEDURE — 25010000002 FOSAPREPITANT PER 1 MG: Performed by: INTERNAL MEDICINE

## 2021-09-14 PROCEDURE — 96375 TX/PRO/DX INJ NEW DRUG ADDON: CPT

## 2021-09-14 PROCEDURE — 99214 OFFICE O/P EST MOD 30 MIN: CPT | Performed by: NURSE PRACTITIONER

## 2021-09-14 PROCEDURE — 25010000002 DEXAMETHASONE SODIUM PHOSPHATE 100 MG/10ML SOLUTION: Performed by: INTERNAL MEDICINE

## 2021-09-14 PROCEDURE — 85025 COMPLETE CBC W/AUTO DIFF WBC: CPT

## 2021-09-14 PROCEDURE — 25010000002 FLUOROURACIL PER 500 MG: Performed by: NURSE PRACTITIONER

## 2021-09-14 PROCEDURE — 25010000002 BEVACIZUMAB PER 10 MG: Performed by: NURSE PRACTITIONER

## 2021-09-14 PROCEDURE — 25010000002 LEUCOVORIN CALCIUM PER 50 MG: Performed by: NURSE PRACTITIONER

## 2021-09-14 PROCEDURE — 96416 CHEMO PROLONG INFUSE W/PUMP: CPT

## 2021-09-14 PROCEDURE — 36593 DECLOT VASCULAR DEVICE: CPT

## 2021-09-14 PROCEDURE — 25010000002 BEVACIZUMAB 100 MG/4ML SOLUTION 4 ML VIAL: Performed by: NURSE PRACTITIONER

## 2021-09-14 RX ORDER — PALONOSETRON 0.05 MG/ML
0.25 INJECTION, SOLUTION INTRAVENOUS ONCE
Status: COMPLETED | OUTPATIENT
Start: 2021-09-14 | End: 2021-09-14

## 2021-09-14 RX ORDER — SODIUM CHLORIDE 9 MG/ML
250 INJECTION, SOLUTION INTRAVENOUS ONCE
Status: COMPLETED | OUTPATIENT
Start: 2021-09-14 | End: 2021-09-14

## 2021-09-14 RX ADMIN — ALTEPLASE: 2.2 INJECTION, POWDER, LYOPHILIZED, FOR SOLUTION INTRAVENOUS at 09:45

## 2021-09-14 RX ADMIN — BEVACIZUMAB 900 MG: 400 INJECTION, SOLUTION INTRAVENOUS at 11:35

## 2021-09-14 RX ADMIN — PALONOSETRON 0.25 MG: 0.05 INJECTION, SOLUTION INTRAVENOUS at 10:46

## 2021-09-14 RX ADMIN — SODIUM CHLORIDE 250 ML: 9 INJECTION, SOLUTION INTRAVENOUS at 10:46

## 2021-09-14 RX ADMIN — LEUCOVORIN CALCIUM 810 MG: 350 INJECTION, POWDER, LYOPHILIZED, FOR SOLUTION INTRAMUSCULAR; INTRAVENOUS at 12:20

## 2021-09-14 RX ADMIN — SODIUM CHLORIDE 100 ML: 9 INJECTION, SOLUTION INTRAVENOUS at 10:46

## 2021-09-14 RX ADMIN — FLUOROURACIL 4850 MG: 50 INJECTION, SOLUTION INTRAVENOUS at 13:12

## 2021-09-14 RX ADMIN — DEXAMETHASONE SODIUM PHOSPHATE 12 MG: 10 INJECTION, SOLUTION INTRAMUSCULAR; INTRAVENOUS at 11:19

## 2021-09-16 ENCOUNTER — INFUSION (OUTPATIENT)
Dept: ONCOLOGY | Facility: HOSPITAL | Age: 41
End: 2021-09-16

## 2021-09-16 DIAGNOSIS — C20 ADENOCARCINOMA OF RECTUM (HCC): Primary | ICD-10-CM

## 2021-09-16 DIAGNOSIS — Z45.2 ENCOUNTER FOR FITTING AND ADJUSTMENT OF VASCULAR CATHETER: Primary | ICD-10-CM

## 2021-09-16 PROCEDURE — 25010000002 HEPARIN LOCK FLUSH PER 10 UNITS: Performed by: INTERNAL MEDICINE

## 2021-09-16 RX ORDER — SODIUM CHLORIDE 0.9 % (FLUSH) 0.9 %
10 SYRINGE (ML) INJECTION AS NEEDED
Status: DISCONTINUED | OUTPATIENT
Start: 2021-09-16 | End: 2021-09-16 | Stop reason: HOSPADM

## 2021-09-16 RX ORDER — HEPARIN SODIUM (PORCINE) LOCK FLUSH IV SOLN 100 UNIT/ML 100 UNIT/ML
500 SOLUTION INTRAVENOUS AS NEEDED
Status: DISCONTINUED | OUTPATIENT
Start: 2021-09-16 | End: 2021-09-16 | Stop reason: HOSPADM

## 2021-09-16 RX ORDER — HEPARIN SODIUM (PORCINE) LOCK FLUSH IV SOLN 100 UNIT/ML 100 UNIT/ML
500 SOLUTION INTRAVENOUS AS NEEDED
Status: CANCELLED | OUTPATIENT
Start: 2021-09-16

## 2021-09-16 RX ORDER — SODIUM CHLORIDE 0.9 % (FLUSH) 0.9 %
10 SYRINGE (ML) INJECTION AS NEEDED
Status: CANCELLED | OUTPATIENT
Start: 2021-09-16

## 2021-09-16 RX ADMIN — Medication 500 UNITS: at 13:05

## 2021-09-16 RX ADMIN — SODIUM CHLORIDE, PRESERVATIVE FREE 10 ML: 5 INJECTION INTRAVENOUS at 13:04

## 2021-09-24 ENCOUNTER — HOSPITAL ENCOUNTER (OUTPATIENT)
Dept: PET IMAGING | Facility: HOSPITAL | Age: 41
Discharge: HOME | End: 2021-09-24

## 2021-09-24 DIAGNOSIS — C78.00 MALIGNANT NEOPLASM METASTATIC TO LUNG, UNSPECIFIED LATERALITY (HCC): ICD-10-CM

## 2021-09-24 DIAGNOSIS — C18.9 MALIGNANT NEOPLASM OF COLON, UNSPECIFIED PART OF COLON (HCC): Chronic | ICD-10-CM

## 2021-09-24 LAB — GLUCOSE BLDC GLUCOMTR-MCNC: 109 MG/DL (ref 70–130)

## 2021-09-24 PROCEDURE — 0 FLUDEOXYGLUCOSE F18 SOLUTION: Performed by: INTERNAL MEDICINE

## 2021-09-24 PROCEDURE — 82962 GLUCOSE BLOOD TEST: CPT

## 2021-09-24 PROCEDURE — A9552 F18 FDG: HCPCS | Performed by: INTERNAL MEDICINE

## 2021-09-24 PROCEDURE — 78815 PET IMAGE W/CT SKULL-THIGH: CPT

## 2021-09-24 RX ADMIN — FLUDEOXYGLUCOSE F18 1 DOSE: 300 INJECTION INTRAVENOUS at 09:09

## 2021-09-27 DIAGNOSIS — K52.1 CHEMOTHERAPY INDUCED DIARRHEA: ICD-10-CM

## 2021-09-27 DIAGNOSIS — T45.1X5A CHEMOTHERAPY INDUCED DIARRHEA: ICD-10-CM

## 2021-09-27 RX ORDER — DIPHENOXYLATE HYDROCHLORIDE AND ATROPINE SULFATE 2.5; .025 MG/1; MG/1
1 TABLET ORAL 4 TIMES DAILY PRN
Qty: 120 TABLET | Refills: 1 | Status: SHIPPED | OUTPATIENT
Start: 2021-09-27 | End: 2022-04-22 | Stop reason: SDUPTHER

## 2021-09-28 ENCOUNTER — INFUSION (OUTPATIENT)
Dept: ONCOLOGY | Facility: HOSPITAL | Age: 41
End: 2021-09-28

## 2021-09-28 ENCOUNTER — OFFICE VISIT (OUTPATIENT)
Dept: ONCOLOGY | Facility: CLINIC | Age: 41
End: 2021-09-28

## 2021-09-28 VITALS
DIASTOLIC BLOOD PRESSURE: 93 MMHG | BODY MASS INDEX: 30.13 KG/M2 | RESPIRATION RATE: 20 BRPM | TEMPERATURE: 97.5 F | WEIGHT: 187.5 LBS | SYSTOLIC BLOOD PRESSURE: 119 MMHG | HEIGHT: 66 IN | OXYGEN SATURATION: 96 % | HEART RATE: 108 BPM

## 2021-09-28 DIAGNOSIS — G62.0 DRUG-INDUCED PERIPHERAL NEUROPATHY (HCC): ICD-10-CM

## 2021-09-28 DIAGNOSIS — I82.210 THROMBOSIS OF SUPERIOR VENA CAVA (HCC): ICD-10-CM

## 2021-09-28 DIAGNOSIS — C20 ADENOCARCINOMA OF RECTUM (HCC): ICD-10-CM

## 2021-09-28 DIAGNOSIS — C20 ADENOCARCINOMA OF RECTUM (HCC): Primary | Chronic | ICD-10-CM

## 2021-09-28 DIAGNOSIS — L27.1 HAND FOOT SYNDROME: Chronic | ICD-10-CM

## 2021-09-28 DIAGNOSIS — E61.1 IRON DEFICIENCY: ICD-10-CM

## 2021-09-28 DIAGNOSIS — C20 ADENOCARCINOMA OF RECTUM (HCC): Chronic | ICD-10-CM

## 2021-09-28 DIAGNOSIS — C78.00 MALIGNANT NEOPLASM METASTATIC TO LUNG, UNSPECIFIED LATERALITY (HCC): ICD-10-CM

## 2021-09-28 DIAGNOSIS — D68.51 FACTOR V LEIDEN MUTATION (HCC): ICD-10-CM

## 2021-09-28 DIAGNOSIS — Z79.01 ANTICOAGULATION ADEQUATE: ICD-10-CM

## 2021-09-28 LAB
ALBUMIN SERPL-MCNC: 3.8 G/DL (ref 3.5–5.2)
ALBUMIN/GLOB SERPL: 1.3 G/DL (ref 1.1–2.4)
ALP SERPL-CCNC: 102 U/L (ref 38–116)
ALT SERPL W P-5'-P-CCNC: 84 U/L (ref 0–33)
ANION GAP SERPL CALCULATED.3IONS-SCNC: 11.7 MMOL/L (ref 5–15)
AST SERPL-CCNC: 43 U/L (ref 0–32)
BASOPHILS # BLD AUTO: 0.02 10*3/MM3 (ref 0–0.2)
BASOPHILS NFR BLD AUTO: 0.3 % (ref 0–1.5)
BILIRUB SERPL-MCNC: 0.3 MG/DL (ref 0.2–1.2)
BILIRUB UR QL STRIP: NEGATIVE
BUN SERPL-MCNC: 11 MG/DL (ref 6–20)
BUN/CREAT SERPL: 14.3 (ref 7.3–30)
CALCIUM SPEC-SCNC: 9.6 MG/DL (ref 8.5–10.2)
CHLORIDE SERPL-SCNC: 101 MMOL/L (ref 98–107)
CLARITY UR: CLEAR
CO2 SERPL-SCNC: 24.3 MMOL/L (ref 22–29)
COLOR UR: YELLOW
CREAT SERPL-MCNC: 0.77 MG/DL (ref 0.6–1.1)
DEPRECATED RDW RBC AUTO: 59.4 FL (ref 37–54)
EOSINOPHIL # BLD AUTO: 0.21 10*3/MM3 (ref 0–0.4)
EOSINOPHIL NFR BLD AUTO: 3.6 % (ref 0.3–6.2)
ERYTHROCYTE [DISTWIDTH] IN BLOOD BY AUTOMATED COUNT: 15.6 % (ref 12.3–15.4)
GFR SERPL CREATININE-BSD FRML MDRD: 82 ML/MIN/1.73
GLOBULIN UR ELPH-MCNC: 3 GM/DL (ref 1.8–3.5)
GLUCOSE SERPL-MCNC: 114 MG/DL (ref 74–124)
GLUCOSE UR STRIP-MCNC: NEGATIVE MG/DL
HCT VFR BLD AUTO: 48.5 % (ref 34–46.6)
HGB BLD-MCNC: 16.1 G/DL (ref 12–15.9)
HGB UR QL STRIP.AUTO: NEGATIVE
IMM GRANULOCYTES # BLD AUTO: 0.01 10*3/MM3 (ref 0–0.05)
IMM GRANULOCYTES NFR BLD AUTO: 0.2 % (ref 0–0.5)
KETONES UR QL STRIP: NEGATIVE
LEUKOCYTE ESTERASE UR QL STRIP.AUTO: NEGATIVE
LYMPHOCYTES # BLD AUTO: 1.13 10*3/MM3 (ref 0.7–3.1)
LYMPHOCYTES NFR BLD AUTO: 19.6 % (ref 19.6–45.3)
MCH RBC QN AUTO: 34.5 PG (ref 26.6–33)
MCHC RBC AUTO-ENTMCNC: 33.2 G/DL (ref 31.5–35.7)
MCV RBC AUTO: 104.1 FL (ref 79–97)
MONOCYTES # BLD AUTO: 0.56 10*3/MM3 (ref 0.1–0.9)
MONOCYTES NFR BLD AUTO: 9.7 % (ref 5–12)
NEUTROPHILS NFR BLD AUTO: 3.85 10*3/MM3 (ref 1.7–7)
NEUTROPHILS NFR BLD AUTO: 66.6 % (ref 42.7–76)
NITRITE UR QL STRIP: NEGATIVE
NRBC BLD AUTO-RTO: 0 /100 WBC (ref 0–0.2)
PH UR STRIP.AUTO: 5.5 [PH] (ref 4.5–8)
PLATELET # BLD AUTO: 177 10*3/MM3 (ref 140–450)
PMV BLD AUTO: 9.3 FL (ref 6–12)
POTASSIUM SERPL-SCNC: 4.2 MMOL/L (ref 3.5–4.7)
PROT SERPL-MCNC: 6.8 G/DL (ref 6.3–8)
PROT UR QL STRIP: ABNORMAL
RBC # BLD AUTO: 4.66 10*6/MM3 (ref 3.77–5.28)
SODIUM SERPL-SCNC: 137 MMOL/L (ref 134–145)
SP GR UR STRIP: >=1.03 (ref 1–1.03)
UROBILINOGEN UR QL STRIP: ABNORMAL
WBC # BLD AUTO: 5.78 10*3/MM3 (ref 3.4–10.8)

## 2021-09-28 PROCEDURE — 25010000002 ALTEPLASE 2 MG RECONSTITUTED SOLUTION: Performed by: INTERNAL MEDICINE

## 2021-09-28 PROCEDURE — 80053 COMPREHEN METABOLIC PANEL: CPT

## 2021-09-28 PROCEDURE — 36593 DECLOT VASCULAR DEVICE: CPT

## 2021-09-28 PROCEDURE — 99214 OFFICE O/P EST MOD 30 MIN: CPT | Performed by: INTERNAL MEDICINE

## 2021-09-28 PROCEDURE — 81003 URINALYSIS AUTO W/O SCOPE: CPT

## 2021-09-28 PROCEDURE — 85025 COMPLETE CBC W/AUTO DIFF WBC: CPT

## 2021-09-28 RX ADMIN — ALTEPLASE: 2.2 INJECTION, POWDER, LYOPHILIZED, FOR SOLUTION INTRAVENOUS at 08:45

## 2021-09-30 ENCOUNTER — INFUSION (OUTPATIENT)
Dept: ONCOLOGY | Facility: HOSPITAL | Age: 41
End: 2021-09-30

## 2021-09-30 DIAGNOSIS — Z45.2 ENCOUNTER FOR FITTING AND ADJUSTMENT OF VASCULAR CATHETER: Primary | ICD-10-CM

## 2021-09-30 PROCEDURE — 25010000002 HEPARIN LOCK FLUSH PER 10 UNITS: Performed by: INTERNAL MEDICINE

## 2021-09-30 PROCEDURE — 96523 IRRIG DRUG DELIVERY DEVICE: CPT

## 2021-09-30 RX ORDER — SODIUM CHLORIDE 0.9 % (FLUSH) 0.9 %
10 SYRINGE (ML) INJECTION AS NEEDED
Status: CANCELLED | OUTPATIENT
Start: 2021-09-30

## 2021-09-30 RX ORDER — SODIUM CHLORIDE 0.9 % (FLUSH) 0.9 %
10 SYRINGE (ML) INJECTION AS NEEDED
Status: DISCONTINUED | OUTPATIENT
Start: 2021-09-30 | End: 2021-09-30 | Stop reason: HOSPADM

## 2021-09-30 RX ORDER — HEPARIN SODIUM (PORCINE) LOCK FLUSH IV SOLN 100 UNIT/ML 100 UNIT/ML
500 SOLUTION INTRAVENOUS AS NEEDED
Status: DISCONTINUED | OUTPATIENT
Start: 2021-09-30 | End: 2021-09-30 | Stop reason: HOSPADM

## 2021-09-30 RX ORDER — HEPARIN SODIUM (PORCINE) LOCK FLUSH IV SOLN 100 UNIT/ML 100 UNIT/ML
500 SOLUTION INTRAVENOUS AS NEEDED
Status: CANCELLED | OUTPATIENT
Start: 2021-09-30

## 2021-09-30 RX ADMIN — SODIUM CHLORIDE, PRESERVATIVE FREE 10 ML: 5 INJECTION INTRAVENOUS at 12:35

## 2021-09-30 RX ADMIN — Medication 500 UNITS: at 12:35

## 2021-10-03 PROBLEM — C34.90 LUNG CANCER: Status: RESOLVED | Noted: 2020-09-28 | Resolved: 2021-10-03

## 2021-10-15 ENCOUNTER — OFFICE VISIT (OUTPATIENT)
Dept: INTERNAL MEDICINE | Facility: CLINIC | Age: 41
End: 2021-10-15

## 2021-10-15 VITALS
OXYGEN SATURATION: 98 % | WEIGHT: 193.6 LBS | SYSTOLIC BLOOD PRESSURE: 132 MMHG | HEART RATE: 109 BPM | HEIGHT: 66 IN | DIASTOLIC BLOOD PRESSURE: 76 MMHG | TEMPERATURE: 97.8 F | RESPIRATION RATE: 16 BRPM | BODY MASS INDEX: 31.12 KG/M2

## 2021-10-15 DIAGNOSIS — D68.51 HETEROZYGOUS FACTOR V LEIDEN MUTATION (HCC): Chronic | ICD-10-CM

## 2021-10-15 DIAGNOSIS — F33.9 MONOPOLAR DEPRESSION (HCC): Chronic | ICD-10-CM

## 2021-10-15 DIAGNOSIS — R00.0 TACHYCARDIA: Chronic | ICD-10-CM

## 2021-10-15 DIAGNOSIS — I10 PRIMARY HYPERTENSION: Primary | Chronic | ICD-10-CM

## 2021-10-15 PROCEDURE — 99214 OFFICE O/P EST MOD 30 MIN: CPT | Performed by: NURSE PRACTITIONER

## 2021-10-16 ENCOUNTER — IMMUNIZATION (OUTPATIENT)
Dept: VACCINE CLINIC | Facility: HOSPITAL | Age: 41
End: 2021-10-16

## 2021-10-16 PROCEDURE — 0004A HC ADM SARSCOV2 30MCG/0.3ML BOOSTER: CPT | Performed by: INTERNAL MEDICINE

## 2021-10-16 PROCEDURE — 91300 HC SARSCOV02 VAC 30MCG/0.3ML IM: CPT | Performed by: INTERNAL MEDICINE

## 2021-10-16 PROCEDURE — 0003A: CPT | Performed by: INTERNAL MEDICINE

## 2021-10-17 NOTE — PROGRESS NOTES
Chief Complaint  Anxiety (3 month follow up ), Hypertension, and Cancer     Subjective:      History of Present Illness {CC  Problem List  Visit  Diagnosis   Encounters  Notes  Medications  Labs  Result Review Imaging  Media :23}     Carole Weaver presents to [facility] for follow up:     1) rectal cancer with metastasis: her last pet scan was improved.  She is on 3 month chemo break.   NM PET/CT Skull Base to Mid Thigh (09/24/2021 10:10 AM)  R UL pulmonary nodule smaller       2) vaccines: now that she is on chemo break.  She will get COVID booster today and will get flu vaccine in 2 weeks.     3) depression: states doing well on lexapro  Great mood today.     4) tachycardia: 2/2 to chemo.  She will follow up with Dr Bustos.     5) hypertension: she was given lisinopril 10 mg. She did not tolerate. BP is controlled today.  If needs control, she will need lower dose, 2.5 - 5 mg       History of Present Illness       I have reviewed patient's medical history, any new submitted information provided by patient or medical assistant and updated medical record.      Objective:      Physical Exam  Vitals reviewed.   Constitutional:       Appearance: Normal appearance. She is well-developed.   HENT:      Head:      Comments: Wearing mask due to COVID   Neck:      Thyroid: No thyromegaly.   Cardiovascular:      Rate and Rhythm: Tachycardia present.   Pulmonary:      Effort: Pulmonary effort is normal.      Comments: E/U   Musculoskeletal:      Right lower leg: Edema present.      Left lower leg: No edema.   Lymphadenopathy:      Cervical: No cervical adenopathy.   Skin:     General: Skin is warm and dry.      Capillary Refill: Capillary refill takes 2 to 3 seconds.   Neurological:      Mental Status: She is alert and oriented to person, place, and time.   Psychiatric:         Mood and Affect: Mood normal.         Behavior: Behavior normal. Behavior is cooperative.          "Thought Content: Thought content normal.         Judgment: Judgment normal.        Result Review  Data Reviewed:{ Labs  Result Review  Imaging  Med Tab  Media :23}     The following data was reviewed by: Deepika Vela III, NP-C on 10/15/2021  Common labs    Common Labsle 8/31/21 8/31/21 9/14/21 9/14/21 9/28/21 9/28/21    0931 0931 0931 0931 0831 0831   Glucose  120  131 (A)  114   BUN  9  7  11   Creatinine  0.78  0.90  0.77   eGFR Non African Am  81  69  82   Sodium  136  136  137   Potassium  4.5  5.1 (A)  4.2   Chloride  103  98  101   Calcium  9.1  9.9  9.6   Albumin  3.60  4.10  3.80   Total Bilirubin  0.3  0.4  0.3   Alkaline Phosphatase  98  110  102   AST (SGOT)  26  41 (A)  43 (A)   ALT (SGPT)  52 (A)  63 (A)  84 (A)   WBC 6.16  5.67  5.78    Hemoglobin 15.0  17.0 (A)  16.1 (A)    Hematocrit 45.2  52.1 (A)  48.5 (A)    Platelets 150  211  177    (A) Abnormal value                   Vital Signs:   /76 (BP Location: Left arm, Patient Position: Sitting, Cuff Size: Adult)   Pulse 109   Temp 97.8 °F (36.6 °C) (Temporal)   Resp 16   Ht 167.6 cm (66\")   Wt 87.8 kg (193 lb 9.6 oz)   SpO2 98%   BMI 31.25 kg/m²         Requested Prescriptions      No prescriptions requested or ordered in this encounter       Routine medications provided by this office will also be refilled via pharmacy request.       Current Outpatient Medications:   •  clonazePAM (KlonoPIN) 1 MG tablet, TAKE 1 TABLET BY MOUTH THREE TIMES A DAY AS NEEDED FOR ANXIETY OR SEIZURES, Disp: 90 tablet, Rfl: 0  •  Cyanocobalamin (VITAMIN B-12 PO), Take  by mouth., Disp: , Rfl:   •  dicyclomine (BENTYL) 20 MG tablet, TAKE 1 TABLET BY MOUTH EVERY 6 HOURS AS NEEDED, Disp: 360 tablet, Rfl: 0  •  diphenoxylate-atropine (LOMOTIL) 2.5-0.025 MG per tablet, TAKE 1 TABLET BY MOUTH 4 (FOUR) TIMES A DAY AS NEEDED FOR DIARRHEA., Disp: 120 tablet, Rfl: 1  •  enoxaparin (LOVENOX) 100 MG/ML solution syringe, Inject 0.9 mL under the skin into " the appropriate area as directed Every 12 (Twelve) Hours., Disp: 60 mL, Rfl: 2  •  escitalopram (LEXAPRO) 10 MG tablet, TAKE 1 TABLET BY MOUTH EVERY DAY, Disp: 30 tablet, Rfl: 5  •  famotidine (PEPCID) 20 MG tablet, TAKE 1 TABLET BY MOUTH TWICE A DAY, Disp: 180 tablet, Rfl: 1  •  fluorouracil in dextrose 5 % 250 mL infusion, Infuse  into a venous catheter 1 (One) Time. Takes twice a month., Disp: , Rfl:   •  Loratadine (CLARITIN) 10 MG capsule, Take 10 mg by mouth Every Evening., Disp: , Rfl:   •  mineral oil-hydrophilic petrolatum (AQUAPHOR) ointment, Apply 1 application topically to the appropriate area as directed As Needed for Dry Skin., Disp: , Rfl:   •  nystatin (MYCOSTATIN) 411153 UNIT/GM cream, Apply  topically to the appropriate area as directed 2 (Two) Times a Day As Needed (rash)., Disp: 30 g, Rfl: 2  •  ondansetron (ZOFRAN) 8 MG tablet, Take 1 tablet by mouth 3 (Three) Times a Day As Needed for Nausea or Vomiting., Disp: 60 tablet, Rfl: 5  •  prochlorperazine (COMPAZINE) 10 MG tablet, Take 1 tablet by mouth Every 6 (Six) Hours As Needed for Nausea or Vomiting., Disp: 30 tablet, Rfl: 2  •  promethazine (PHENERGAN) 25 MG tablet, Take 1 tablet by mouth Every 6 (Six) Hours As Needed for Nausea or Vomiting., Disp: 60 tablet, Rfl: 2     Assessment and Plan:      Assessment and Plan {CC Problem List  Visit Diagnosis  ROS  Review (Popup)  Health Maintenance  Quality  BestPractice  Medications  SmartSets  SnapShot Encounters  Media :23}     Problem List Items Addressed This Visit        Cardiac and Vasculature    HTN (hypertension) - Primary (Chronic)    Overview     Lisinopril 10 mg too much. (weakness, dizziness)     Continue to monitor - if need to restart, advised lower dose.           Tachycardia (Chronic)    Current Assessment & Plan     She will be seen by Dr Bustos in November             Coag and Thromboembolic    Heterozygous factor V Leiden mutation (HCC) (Chronic)       Mental Health     Monopolar depression (HCC) (Chronic)    Current Assessment & Plan     Patient's depression is recurrent and is mild without psychosis. Their depression is currently in full remission and the condition is improving with treatment. This will be reassessed at the next regular appointment. F/U as described:patient will continue current medication therapy.    Discussed her taking a family trip while on chemo holiday.                Follow Up {Instructions Charge Capture  Follow-up Communications :23}     Return in about 3 months (around 1/15/2022).    Patient was given instructions and counseling regarding her condition or for health maintenance advice. Please see specific information pulled into the AVS if appropriate.    Dragon disclaimer:   Much of this encounter note is an electronic transcription/translation of spoken language to printed text. The electronic translation of spoken language may permit erroneous, or at times, nonsensical words or phrases to be inadvertently transcribed; Although I have reviewed the note for such errors, some may still exist.     Additional Patient Counseling:       There are no Patient Instructions on file for this visit.

## 2021-10-17 NOTE — ASSESSMENT & PLAN NOTE
Patient's depression is recurrent and is mild without psychosis. Their depression is currently in full remission and the condition is improving with treatment. This will be reassessed at the next regular appointment. F/U as described:patient will continue current medication therapy.    Discussed her taking a family trip while on chemo holiday.

## 2021-10-21 DIAGNOSIS — C20 ADENOCARCINOMA OF RECTUM (HCC): ICD-10-CM

## 2021-10-21 DIAGNOSIS — F41.9 ANXIETY: ICD-10-CM

## 2021-10-22 RX ORDER — CLONAZEPAM 1 MG/1
TABLET ORAL
Qty: 90 TABLET | Refills: 0 | Status: SHIPPED | OUTPATIENT
Start: 2021-10-22 | End: 2021-12-30

## 2021-10-22 RX ORDER — ONDANSETRON HYDROCHLORIDE 8 MG/1
8 TABLET, FILM COATED ORAL 3 TIMES DAILY PRN
Qty: 60 TABLET | Refills: 0 | Status: SHIPPED | OUTPATIENT
Start: 2021-10-22 | End: 2021-11-15

## 2021-10-22 NOTE — TELEPHONE ENCOUNTER
Enoxaparin refill request rec electronically from [facility]. Per last office note from Dr Nguyen-pt is to continue.    Continue Lovenox 1 mg/kg twice daily.    I have routed the rx to Patience POND NP for signature as Dr Nguyen is out of the office. Once signed it will be escribed to [facility].

## 2021-10-28 ENCOUNTER — CLINICAL SUPPORT (OUTPATIENT)
Dept: INTERNAL MEDICINE | Facility: CLINIC | Age: 41
End: 2021-10-28

## 2021-10-28 DIAGNOSIS — Z23 NEED FOR INFLUENZA VACCINATION: Primary | ICD-10-CM

## 2021-10-28 PROCEDURE — 90471 IMMUNIZATION ADMIN: CPT | Performed by: NURSE PRACTITIONER

## 2021-10-28 PROCEDURE — 90686 IIV4 VACC NO PRSV 0.5 ML IM: CPT | Performed by: NURSE PRACTITIONER

## 2021-11-10 ENCOUNTER — TELEPHONE (OUTPATIENT)
Dept: ONCOLOGY | Facility: CLINIC | Age: 41
End: 2021-11-10

## 2021-11-10 NOTE — TELEPHONE ENCOUNTER
Caller: Carole Weaver    Relationship: Self    Best call back number: [phone]    What is the best time to reach you: ASAP    Who are you requesting to speak with (clinical staff, provider,  specific staff member):     Do you know the name of the person who called:     What was the call regarding: PT NEEDS TO SCHEDULE PORT FLUSH    Do you require a callback: YES

## 2021-11-11 ENCOUNTER — OFFICE VISIT (OUTPATIENT)
Dept: CARDIOLOGY | Facility: CLINIC | Age: 41
End: 2021-11-11

## 2021-11-11 ENCOUNTER — HOSPITAL ENCOUNTER (OUTPATIENT)
Dept: CARDIOLOGY | Facility: HOSPITAL | Age: 41
Discharge: HOME | End: 2021-11-11
Admitting: INTERNAL MEDICINE

## 2021-11-11 VITALS
DIASTOLIC BLOOD PRESSURE: 80 MMHG | WEIGHT: 191.6 LBS | HEIGHT: 66 IN | BODY MASS INDEX: 30.79 KG/M2 | SYSTOLIC BLOOD PRESSURE: 128 MMHG | HEART RATE: 100 BPM

## 2021-11-11 VITALS
SYSTOLIC BLOOD PRESSURE: 122 MMHG | DIASTOLIC BLOOD PRESSURE: 86 MMHG | OXYGEN SATURATION: 97 % | HEART RATE: 99 BPM | BODY MASS INDEX: 31.02 KG/M2 | HEIGHT: 66 IN | WEIGHT: 193 LBS

## 2021-11-11 DIAGNOSIS — D68.51 FACTOR V LEIDEN MUTATION (HCC): ICD-10-CM

## 2021-11-11 DIAGNOSIS — C20 ADENOCARCINOMA OF RECTUM (HCC): ICD-10-CM

## 2021-11-11 DIAGNOSIS — I10 PRIMARY HYPERTENSION: Chronic | ICD-10-CM

## 2021-11-11 DIAGNOSIS — D68.51 HETEROZYGOUS FACTOR V LEIDEN MUTATION (HCC): ICD-10-CM

## 2021-11-11 DIAGNOSIS — C20 ADENOCARCINOMA OF RECTUM (HCC): Primary | Chronic | ICD-10-CM

## 2021-11-11 LAB
AORTIC ARCH: 2.6 CM
ASCENDING AORTA: 2.6 CM
BH CV ECHO MEAS - ACS: 1.7 CM
BH CV ECHO MEAS - AO MAX PG (FULL): 4.1 MMHG
BH CV ECHO MEAS - AO MAX PG: 8.4 MMHG
BH CV ECHO MEAS - AO MEAN PG (FULL): 1.7 MMHG
BH CV ECHO MEAS - AO MEAN PG: 4.4 MMHG
BH CV ECHO MEAS - AO ROOT AREA (BSA CORRECTED): 1.5
BH CV ECHO MEAS - AO ROOT AREA: 7.1 CM^2
BH CV ECHO MEAS - AO ROOT DIAM: 3 CM
BH CV ECHO MEAS - AO V2 MAX: 145.2 CM/SEC
BH CV ECHO MEAS - AO V2 MEAN: 99.8 CM/SEC
BH CV ECHO MEAS - AO V2 VTI: 22.4 CM
BH CV ECHO MEAS - ASC AORTA: 2.6 CM
BH CV ECHO MEAS - AVA(I,A): 1.8 CM^2
BH CV ECHO MEAS - AVA(I,D): 1.8 CM^2
BH CV ECHO MEAS - AVA(V,A): 1.6 CM^2
BH CV ECHO MEAS - AVA(V,D): 1.6 CM^2
BH CV ECHO MEAS - BSA(HAYCOCK): 2 M^2
BH CV ECHO MEAS - BSA: 2 M^2
BH CV ECHO MEAS - BZI_BMI: 31.2 KILOGRAMS/M^2
BH CV ECHO MEAS - BZI_METRIC_HEIGHT: 167.6 CM
BH CV ECHO MEAS - BZI_METRIC_WEIGHT: 87.5 KG
BH CV ECHO MEAS - EDV(MOD-SP2): 74 ML
BH CV ECHO MEAS - EDV(MOD-SP4): 52 ML
BH CV ECHO MEAS - EDV(TEICH): 60.4 ML
BH CV ECHO MEAS - EF(CUBED): 66 %
BH CV ECHO MEAS - EF(MOD-BP): 50.6 %
BH CV ECHO MEAS - EF(MOD-SP2): 51.4 %
BH CV ECHO MEAS - EF(MOD-SP4): 50 %
BH CV ECHO MEAS - EF(TEICH): 58.4 %
BH CV ECHO MEAS - ESV(MOD-SP2): 36 ML
BH CV ECHO MEAS - ESV(MOD-SP4): 26 ML
BH CV ECHO MEAS - ESV(TEICH): 25.2 ML
BH CV ECHO MEAS - FS: 30.2 %
BH CV ECHO MEAS - IVS/LVPW: 0.99
BH CV ECHO MEAS - IVSD: 1.1 CM
BH CV ECHO MEAS - LAT PEAK E' VEL: 9.8 CM/SEC
BH CV ECHO MEAS - LV DIASTOLIC VOL/BSA (35-75): 26.4 ML/M^2
BH CV ECHO MEAS - LV MASS(C)D: 127.8 GRAMS
BH CV ECHO MEAS - LV MASS(C)DI: 64.9 GRAMS/M^2
BH CV ECHO MEAS - LV MAX PG: 4.4 MMHG
BH CV ECHO MEAS - LV MEAN PG: 2.7 MMHG
BH CV ECHO MEAS - LV SYSTOLIC VOL/BSA (12-30): 13.2 ML/M^2
BH CV ECHO MEAS - LV V1 MAX: 104.5 CM/SEC
BH CV ECHO MEAS - LV V1 MEAN: 79.2 CM/SEC
BH CV ECHO MEAS - LV V1 VTI: 18.6 CM
BH CV ECHO MEAS - LVIDD: 3.8 CM
BH CV ECHO MEAS - LVIDS: 2.6 CM
BH CV ECHO MEAS - LVLD AP2: 6.9 CM
BH CV ECHO MEAS - LVLD AP4: 6.6 CM
BH CV ECHO MEAS - LVLS AP2: 6 CM
BH CV ECHO MEAS - LVLS AP4: 5.8 CM
BH CV ECHO MEAS - LVOT AREA (M): 2.3 CM^2
BH CV ECHO MEAS - LVOT AREA: 2.2 CM^2
BH CV ECHO MEAS - LVOT DIAM: 1.7 CM
BH CV ECHO MEAS - LVPWD: 1.1 CM
BH CV ECHO MEAS - MED PEAK E' VEL: 6.1 CM/SEC
BH CV ECHO MEAS - MV A DUR: 0.13 SEC
BH CV ECHO MEAS - MV A MAX VEL: 92.1 CM/SEC
BH CV ECHO MEAS - MV DEC SLOPE: 681.8 CM/SEC^2
BH CV ECHO MEAS - MV DEC TIME: 0.15 SEC
BH CV ECHO MEAS - MV E MAX VEL: 67.3 CM/SEC
BH CV ECHO MEAS - MV E/A: 0.73
BH CV ECHO MEAS - MV MAX PG: 5 MMHG
BH CV ECHO MEAS - MV MEAN PG: 2.9 MMHG
BH CV ECHO MEAS - MV P1/2T MAX VEL: 88.2 CM/SEC
BH CV ECHO MEAS - MV P1/2T: 37.9 MSEC
BH CV ECHO MEAS - MV V2 MAX: 111.3 CM/SEC
BH CV ECHO MEAS - MV V2 MEAN: 81.7 CM/SEC
BH CV ECHO MEAS - MV V2 VTI: 19.2 CM
BH CV ECHO MEAS - MVA P1/2T LCG: 2.5 CM^2
BH CV ECHO MEAS - MVA(P1/2T): 5.8 CM^2
BH CV ECHO MEAS - MVA(VTI): 2.1 CM^2
BH CV ECHO MEAS - PA ACC TIME: 0.07 SEC
BH CV ECHO MEAS - PA MAX PG (FULL): 1.4 MMHG
BH CV ECHO MEAS - PA MAX PG: 2.8 MMHG
BH CV ECHO MEAS - PA PR(ACCEL): 45.7 MMHG
BH CV ECHO MEAS - PA V2 MAX: 84.2 CM/SEC
BH CV ECHO MEAS - PULM A REVS DUR: 0.08 SEC
BH CV ECHO MEAS - PULM A REVS VEL: 36.4 CM/SEC
BH CV ECHO MEAS - PULM DIAS VEL: 34.3 CM/SEC
BH CV ECHO MEAS - PULM S/D: 1.3
BH CV ECHO MEAS - PULM SYS VEL: 42.8 CM/SEC
BH CV ECHO MEAS - PVA(V,A): 1.7 CM^2
BH CV ECHO MEAS - PVA(V,D): 1.7 CM^2
BH CV ECHO MEAS - QP/QS: 0.54
BH CV ECHO MEAS - RAP SYSTOLE: 3 MMHG
BH CV ECHO MEAS - RV MAX PG: 1.4 MMHG
BH CV ECHO MEAS - RV MEAN PG: 0.85 MMHG
BH CV ECHO MEAS - RV V1 MAX: 60 CM/SEC
BH CV ECHO MEAS - RV V1 MEAN: 44.2 CM/SEC
BH CV ECHO MEAS - RV V1 VTI: 9.3 CM
BH CV ECHO MEAS - RVOT AREA: 2.4 CM^2
BH CV ECHO MEAS - RVOT DIAM: 1.7 CM
BH CV ECHO MEAS - SI(AO): 80.9 ML/M^2
BH CV ECHO MEAS - SI(CUBED): 17.8 ML/M^2
BH CV ECHO MEAS - SI(LVOT): 20.9 ML/M^2
BH CV ECHO MEAS - SI(MOD-SP2): 19.3 ML/M^2
BH CV ECHO MEAS - SI(MOD-SP4): 13.2 ML/M^2
BH CV ECHO MEAS - SI(TEICH): 17.9 ML/M^2
BH CV ECHO MEAS - SUP REN AO DIAM: 1.7 CM
BH CV ECHO MEAS - SV(AO): 159.4 ML
BH CV ECHO MEAS - SV(CUBED): 35.1 ML
BH CV ECHO MEAS - SV(LVOT): 41.2 ML
BH CV ECHO MEAS - SV(MOD-SP2): 38 ML
BH CV ECHO MEAS - SV(MOD-SP4): 26 ML
BH CV ECHO MEAS - SV(RVOT): 22.1 ML
BH CV ECHO MEAS - SV(TEICH): 35.3 ML
BH CV ECHO MEAS - TAPSE (>1.6): 1.1 CM
BH CV ECHO MEASUREMENTS AVERAGE E/E' RATIO: 8.47
BH CV XLRA - RV BASE: 2 CM
BH CV XLRA - RV LENGTH: 6.2 CM
BH CV XLRA - RV MID: 1.8 CM
BH CV XLRA - TDI S': 8.1 CM/SEC
LEFT ATRIUM VOLUME INDEX: 6 ML/M2
MAXIMAL PREDICTED HEART RATE: 178 BPM
SINUS: 2.4 CM
STJ: 2.3 CM
STRESS TARGET HR: 151 BPM

## 2021-11-11 PROCEDURE — 93000 ELECTROCARDIOGRAM COMPLETE: CPT | Performed by: INTERNAL MEDICINE

## 2021-11-11 PROCEDURE — 93306 TTE W/DOPPLER COMPLETE: CPT | Performed by: INTERNAL MEDICINE

## 2021-11-11 PROCEDURE — 93306 TTE W/DOPPLER COMPLETE: CPT

## 2021-11-11 PROCEDURE — 25010000002 PERFLUTREN (DEFINITY) 8.476 MG IN SODIUM CHLORIDE (PF) 0.9 % 10 ML INJECTION: Performed by: INTERNAL MEDICINE

## 2021-11-11 PROCEDURE — 93356 MYOCRD STRAIN IMG SPCKL TRCK: CPT | Performed by: INTERNAL MEDICINE

## 2021-11-11 PROCEDURE — 99214 OFFICE O/P EST MOD 30 MIN: CPT | Performed by: INTERNAL MEDICINE

## 2021-11-11 PROCEDURE — 93356 MYOCRD STRAIN IMG SPCKL TRCK: CPT

## 2021-11-11 RX ORDER — METOPROLOL SUCCINATE 25 MG/1
12.5 TABLET, EXTENDED RELEASE ORAL NIGHTLY
Qty: 45 TABLET | Refills: 3 | Status: ON HOLD | OUTPATIENT
Start: 2021-11-11 | End: 2022-11-15

## 2021-11-11 RX ADMIN — PERFLUTREN 1.5 ML: 6.52 INJECTION, SUSPENSION INTRAVENOUS at 08:21

## 2021-11-11 NOTE — PROGRESS NOTES
Date of Office Visit: 2021  Encounter Provider: Tasha Bustos MD  Place of Service: [facility]ARD[facility]  Patient Name: Carole Weaver  :1980      Patient ID:  Carole Weaver is a 41 y.o. female is here for  followup for chemotherapy.        History of Present Illness    She has a history of primary rectal cancer which was surgically resected by Dr. Ольга Ye 2019.  She was started on FOLFOX 6 (leucovorin, fluorouracil and oxaliplatin) 2020 followed by Dr Nguyen.  Follow-up PET scan 2020 showed no metastatic disease in the chest abdomen and pelvis but a stable 6 mm right upper lung pulmonary nodule.  She had a low anterior colon resection with coloanal anastomosis done 8/3/2020.  On 2020, there is a new 8 mm noncalcified pulmonary nodule in the left lower lobe of the lung.  The 6 mm prior pulmonary nodule stable.  PET scan on 2020 showed multiple hypermetabolic small pulmonary nodules.  She was started on pelvic chemotherapy with FOLFIRI (leucovorin, fluorouracil and irinotecan) on 10/13/2020.  She developed peripheral neuropathy and so irinotecan was stopped but then resumed.  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  She had telemedicine visit with [facility] 2021.  They evaluated her chemotherapeutic plan and agreed with treatment for palliative chemotherapy followed by maintenance chemotherapy.  She was on FOLFIRI and bevacizumab.      She is heterozygous for factor V Leiden and has been on prophylactic anticoagulation with Lovenox.  She had 3 pulmonary emboli diagnosed 2019 at that time she was on low-dose Lovenox.  She was then placed on Xarelto which was then made subtherapeutic as they lowered the dose.  She then developed SVC and left brachiocephalic thrombus and was switched to weight-based Lovenox  every 12 hours.     I did review a CTA from 12/2020.  There is no coronary artery calcification.     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  She is smoking half pack cigarettes a day uses no alcohol or drugs and has rare caffeine.     Her father has atrial fibrillation, mother has hypertension.  There is no family history of premature cancer.     Echocardiogram done 4/8/2021 showed ejection fraction 50%, low normal, mid anteroseptal wall hypokinesis, grade 1 diastolic dysfunction.      Echo done 11/11/2021 shows ejection 51-55% with global longitudinal strain -21.7%, grade 1 diastolic dysfunction and mild concentric left ventricular hypertrophy.  This is no change from her baseline echocardiogram done April 2021.  Labs on 9/28/2021 show unremarkable CMP and CBC.  She saw Dr. Nguyen, oncology on 9/28/2021.  At that visit, Dr. Nguyen recommended a 3-month chemotherapy break.  Prior to this she had been on 5-fluorouracil/leucovorin plus avastin.    She has had no chest pain or pressure.  Her heart rate is high which is fairly chronic for her.  She has had no dizziness or syncope.  She presented for a chemotherapeutic treatment and her blood pressure was really high.  She was started on lisinopril 5 mg daily and it dropped her pressure causing significant fatigue and weakness.  She only took it for 3 days and it was discontinued.  She has had no fevers, chills or cough.  She has no nausea or vomiting.  She will resume chemotherapy December 21 and will be receiving this every 3 weeks.  She has no orthopnea or PND.  She has no exertional dyspnea.  She continues to work.    Past Medical History:   Diagnosis Date   • Abdominopelvic abscess (HCC) 08/12/2020    ADMITTED TO [facility]   • Abnormal Pap smear of cervix 02/02/1998    JULIUS I   • Anemia in pregnancy    • Anxiety    • Bilateral epiphora 04/2020   • Bilateral hand swelling 05/02/2020    SEEN AT [facility] ER   •  Cervical lymphadenitis 06/06/2012    SEEN AT [facility] ER   • Chemotherapy induced diarrhea 01/2021   • Chemotherapy induced neutropenia ([facility]) 04/2020   • Chemotherapy-induced nausea 02/2020   • Chemotherapy-induced thrombocytopenia 05/2020   • Chronic diarrhea    • Colon polyps     FOLLOWED BY DR. GERONIMO ESPARZA   • Cystitis 04/24/2020    WITH DEHYDRATION, ADMITTED TO [facility]   • Cystitis 10/27/2020   • Depression    • Drug-induced peripheral neuropathy ([facility]) 05/2020   • Factor V Leiden mutation ([facility])    • Fistula of intestine    • GERD (gastroesophageal reflux disease)    • Hand foot syndrome 05/2020   • Heart murmur     IN CHILDHOOD   • Hematochezia    • Hemorrhoids    • Heterozygous factor V Leiden mutation ([facility])     DX 8-2-2019   • History of anemia    • History of chemotherapy 2019    FOLLOWED BY DR. ALEXANDRU HUNT   • History of gestational diabetes    • History of pre-eclampsia 1998   • History of radiation therapy 2019    FOLLOWED BY DR. JAVON LEWIS   • History of TB skin testing 01/17/2009    TB Skin Test   • HPV in female 1998   • Hypokalemia 09/2019   • Hypomagnesemia 09/2019   • Hyponatremia 06/2021   • IBS (irritable bowel syndrome)    • Ileostomy in place ([facility])     FOLLOWED BY DR. 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   • Perirectal abscess 08/12/2020   • Pulmonary embolism without acute cor pulmonale ([facility]) 09/20/2019    X 3; ADMITTED TO [facility]   • Pulmonary nodule, right 05/2020   • Rectal cancer ([facility]) 07/08/2019    STAGE IIA, INVASIVE MODERATELY DIFFERENTIATED ADENOCARCINOMA, CHEMO AND XRT FINISHED 9/2019   • Right shoulder pain 11/16/2020    SEEN AT [facility] ER   • Right ureteral stone  10/01/2019    SEEN AT [facility] ER   • SAB (spontaneous ) 1996     A2-1 INDUCED   • Sciatica    • Sepsis due to cellulitis (HCC) 2002    LEFT GREAT TOE, ADMITTED TO [facility]   • Tachycardia 2020   • Thrombosis of superior vena cava (HCC) 2020    AND BRACHIOCEPHALIC VEIN, ADMITTED TO [facility]   • Urinary urgency 2020         Past Surgical History:   Procedure Laterality Date   • ABDOMINAL SURGERY     • CHOLECYSTECTOMY N/A 10/10/2003    LAPAROSCOPIC WITH CHOLANGIOGRAM, DR. JAMEY TALAVERA AT [facility]   • COLON RESECTION N/A 2019    Procedure: laparoscopic low anterior colon resection with mobilization of splenic flexure and diverting loop ileostomy: ERAS;  Surgeon: Padmaja Esparza MD;  Location: [facility] MAIN OR;  Service: General   • COLON RESECTION N/A 8/3/2020    Procedure: LOW ANTERIOR COLON RESECTION, COLOANAL ANASTOMOSIS, MOBILIZATION SPLENIC FLEXURE;  Surgeon: Padmaja Esparza MD;  Location: [facility] MAIN OR;  Service: General;  Laterality: N/A;   • COLONOSCOPY N/A 7/15/2020    PATENT ANASTAMOSIS IN MID RECTUM, RESCOPE IN 1 YR, DR. PADMAJA ESPARZA AT [facility]   • COLONOSCOPY W/ POLYPECTOMY N/A 2019    15 MM TUBULOVILLOUS ADENOMA POLYP IN HEPATIC FLEXURE, 20 MMTUBULOVILLOUS ADENOMA WITH HIGH GRADE DYSPLASIA IN RECTOSIGMOID, 4 CM MASS IN MID RECTUM, PATH: INVASIVE MODERATELY DIFFERENTIATED ADENOCARCINOMA, DR. JENNIFER LI AT [facility]   • DILATATION AND EVACUATION N/A    • ENDOSCOPY N/A 2019    LA GRADE A ESOPHAGITIS, GASTRITIS, ALL BIOPSIES BENIGN, DR. JENNIFER LI AT [facility]   • INCISION AND DRAINAGE PERIRECTAL ABSCESS N/A 2020    Procedure: INCISION AND DRAINAGE OF retrorectal dehiscence abcess with drain placement and irrigation;  Surgeon: Padmaja Esparza MD;  Location: [facility] MAIN OR;  Service: General;  Laterality: N/A;   • PAP SMEAR N/A 2014   • SIGMOIDOSCOPY N/A 2019    INFILTRATIVE PARTIALLY OBSTRUCTING LARGE RECTAL CANCER, AREA  PATY, DR. GERONIMO ESPARZA AT [facility]   • SIGMOIDOSCOPY N/A 11/23/2019    AN ULCERATED PARTIALLY OBSTRUCTING MASS IN MID RECTUM, AREA TATTOOED, DR. GERONIMO ESPARZA AT [facility]   • SIGMOIDOSCOPY N/A 7/22/2021    PATENT ANASTAMOSIS IN DISTAL RECTUM, RESCOPE IN 1 YR, DR. GERONIMO ESPARZA AT [facility]   • TRANSRECTAL ULTRASOUND N/A 7/24/2019    Procedure: ULTRASOUND TRANSRECTAL;  Surgeon: Geronimo Esparza MD;  Location: [facility] ENDOSCOPY;  Service: General   • URETEROSCOPY LASER LITHOTRIPSY WITH STENT INSERTION Right 10/30/2019    DR. ESTUARDO BEASLEY AT [place]   • VAGINAL DELIVERY N/A 09/18/1998   • VENOUS ACCESS DEVICE (PORT) INSERTION Left 8/9/2019    Procedure: INSERTION VENOUS ACCESS DEVICE;  Surgeon: Sj Joseph MD;  Location:  [facility];  Service: General   • VENOUS ACCESS DEVICE (PORT) INSERTION N/A 12/18/2020    Procedure: INSERTION VENOUS ACCESS DEVICE right side, removal venous access device left side;  Surgeon: Sj Joseph MD;  Location:  [facility];  Service: General;  Laterality: N/A;   • WISDOM TOOTH EXTRACTION Bilateral 1993       Current Outpatient Medications on File Prior to Visit   Medication Sig Dispense Refill   • clonazePAM (KlonoPIN) 1 MG tablet TAKE 1 TABLET BY MOUTH THREE TIMES A DAY AS NEEDED FOR ANXIETY OR SEIZURES 90 tablet 0   • Cyanocobalamin (VITAMIN B-12 PO) Take  by mouth.     • dicyclomine (BENTYL) 20 MG tablet TAKE 1 TABLET BY MOUTH EVERY 6 HOURS AS NEEDED 360 tablet 0   • diphenoxylate-atropine (LOMOTIL) 2.5-0.025 MG per tablet TAKE 1 TABLET BY MOUTH 4 (FOUR) TIMES A DAY AS NEEDED FOR DIARRHEA. 120 tablet 1   • enoxaparin (LOVENOX) 100 MG/ML solution syringe INJECT 0.9 ML UNDER THE SKIN INTO THE APPROPRIATE AREA AS DIRECTED EVERY 12 (TWELVE) HOURS. 60 each 2   • escitalopram (LEXAPRO) 10 MG tablet TAKE 1 TABLET BY MOUTH EVERY DAY 30 tablet 5   • famotidine (PEPCID) 20 MG tablet TAKE 1 TABLET BY MOUTH TWICE A  tablet 1   • fluorouracil in dextrose 5 % 250 mL infusion Infuse  into a  venous catheter 1 (One) Time. Takes twice a month.     • Loratadine (CLARITIN) 10 MG capsule Take 10 mg by mouth Every Evening.     • mineral oil-hydrophilic petrolatum (AQUAPHOR) ointment Apply 1 application topically to the appropriate area as directed As Needed for Dry Skin.     • nystatin (MYCOSTATIN) 122580 UNIT/GM cream Apply  topically to the appropriate area as directed 2 (Two) Times a Day As Needed (rash). 30 g 2   • ondansetron (ZOFRAN) 8 MG tablet TAKE 1 TABLET BY MOUTH 3 (THREE) TIMES A DAY AS NEEDED FOR NAUSEA OR VOMITING. 60 tablet 0   • prochlorperazine (COMPAZINE) 10 MG tablet Take 1 tablet by mouth Every 6 (Six) Hours As Needed for Nausea or Vomiting. 30 tablet 2   • promethazine (PHENERGAN) 25 MG tablet Take 1 tablet by mouth Every 6 (Six) Hours As Needed for Nausea or Vomiting. 60 tablet 2     No current facility-administered medications on file prior to visit.       Social History     Socioeconomic History   • Marital status:      Spouse name: Justus   • Number of children: 1   • Years of education: College   Tobacco Use   • Smoking status: Current Every Day Smoker     Packs/day: 1.00     Years: 24.00     Pack years: 24.00     Types: Electronic Cigarette, Cigarettes   • Smokeless tobacco: Never Used   Vaping Use   • Vaping Use: Some days   • Substances: Nicotine   • Devices: Disposable   Substance and Sexual Activity   • Alcohol use: Not Currently     Comment: Caffeine use rare   • Drug use: No   • Sexual activity: Defer           ROS    Procedures    ECG 12 Lead    Date/Time: 11/11/2021 8:33 AM  Performed by: Tasha Bustos MD  Authorized by: Tasha Bustos MD   Comparison: compared with previous ECG   Similar to previous ECG  Rhythm: sinus rhythm    Clinical impression: normal ECG                Objective:      Vitals:    11/11/21 0816 11/11/21 0818   BP: 132/80 128/80   BP Location: Left arm Right arm   Pulse: 100    Weight: 86.9 kg (191 lb 9.6 oz)    Height: 167.6 cm  "(66\")      Body mass index is 30.93 kg/m².    Vitals reviewed.   Constitutional:       General: Not in acute distress.     Appearance: Well-developed. Not diaphoretic.   Eyes:      General: No scleral icterus.     Conjunctiva/sclera: Conjunctivae normal.   HENT:      Head: Normocephalic and atraumatic.   Neck:      Thyroid: No thyromegaly.      Vascular: No carotid bruit or JVD.      Lymphadenopathy: No cervical adenopathy.   Pulmonary:      Effort: Pulmonary effort is normal. No respiratory distress.      Breath sounds: Normal breath sounds. No wheezing. No rhonchi. No rales.   Chest:      Chest wall: Not tender to palpatation.   Cardiovascular:      Normal rate. Regular rhythm.      Murmurs: There is no murmur.      No gallop.   Pulses:     Intact distal pulses.   Abdominal:      General: Bowel sounds are normal. There is no distension or abdominal bruit.      Palpations: Abdomen is soft. There is no abdominal mass.      Tenderness: There is no abdominal tenderness.   Musculoskeletal:         General: No deformity.      Extremities: No clubbing present.     Cervical back: Neck supple. Skin:     General: Skin is warm and dry. There is no cyanosis.      Coloration: Skin is not pale.      Findings: No rash.   Neurological:      Mental Status: Alert and oriented to person, place, and time.      Cranial Nerves: No cranial nerve deficit.   Psychiatric:         Judgment: Judgment normal.         Lab Review:       Assessment:      Diagnosis Plan   1. Adenocarcinoma of rectum (HCC)  Adult Transthoracic Echo Complete W/ Cont if Necessary Per Protocol   2. Primary hypertension     3. Factor V Leiden mutation (HCC)       1. Adenocarcinoma of the rectum, chemotherapeutics as noted above.  The bevacizumab (Avastin) is a recombinant antibody targeting VEGF receptor binding.  The fluorouracil as an antimetabolite.  These can increase risk of coronary artery disease, myocardial infarction and cardiomyopathy.  2. Factor V Leiden " with history of DVTs and pulmonary emboli, on therapeutic Lovenox.  3. Tobacco use.  4. Sinus tachycardia  5. Mild LVH with grade 1 diastolic dysfunction, start metoprolol succinate 12.5 mg nightly.  6. Hypotension and severe fatigue with lisinopril 5 mg daily.       Plan:       See me back in May with a repeat echo the same day.  No other testing at this time and she will try metoprolol and keep you posted if it causes hypotension and fatigue for her.

## 2021-11-12 ENCOUNTER — INFUSION (OUTPATIENT)
Dept: ONCOLOGY | Facility: HOSPITAL | Age: 41
End: 2021-11-12

## 2021-11-12 DIAGNOSIS — Z45.2 ENCOUNTER FOR FITTING AND ADJUSTMENT OF VASCULAR CATHETER: Primary | ICD-10-CM

## 2021-11-12 PROCEDURE — 25010000002 HEPARIN LOCK FLUSH PER 10 UNITS: Performed by: INTERNAL MEDICINE

## 2021-11-12 PROCEDURE — 96523 IRRIG DRUG DELIVERY DEVICE: CPT

## 2021-11-12 RX ORDER — SODIUM CHLORIDE 0.9 % (FLUSH) 0.9 %
10 SYRINGE (ML) INJECTION AS NEEDED
Status: CANCELLED | OUTPATIENT
Start: 2021-11-12

## 2021-11-12 RX ORDER — HEPARIN SODIUM (PORCINE) LOCK FLUSH IV SOLN 100 UNIT/ML 100 UNIT/ML
500 SOLUTION INTRAVENOUS AS NEEDED
Status: CANCELLED | OUTPATIENT
Start: 2021-11-12

## 2021-11-12 RX ORDER — HEPARIN SODIUM (PORCINE) LOCK FLUSH IV SOLN 100 UNIT/ML 100 UNIT/ML
500 SOLUTION INTRAVENOUS AS NEEDED
Status: DISCONTINUED | OUTPATIENT
Start: 2021-11-12 | End: 2021-11-12 | Stop reason: HOSPADM

## 2021-11-12 RX ADMIN — Medication 500 UNITS: at 09:29

## 2021-11-15 DIAGNOSIS — C20 ADENOCARCINOMA OF RECTUM (HCC): ICD-10-CM

## 2021-11-15 RX ORDER — ONDANSETRON HYDROCHLORIDE 8 MG/1
8 TABLET, FILM COATED ORAL 3 TIMES DAILY PRN
Qty: 60 TABLET | Refills: 0 | Status: SHIPPED | OUTPATIENT
Start: 2021-11-15 | End: 2021-12-30

## 2021-11-28 ENCOUNTER — HOSPITAL ENCOUNTER (EMERGENCY)
Facility: HOSPITAL | Age: 41
Discharge: HOME | End: 2021-11-29
Attending: EMERGENCY MEDICINE | Admitting: EMERGENCY MEDICINE

## 2021-11-28 DIAGNOSIS — L03.311 ABDOMINAL WALL CELLULITIS: ICD-10-CM

## 2021-11-28 DIAGNOSIS — L02.211 ABSCESS OF ABDOMINAL WALL: Primary | ICD-10-CM

## 2021-11-28 DIAGNOSIS — D68.51 FACTOR V LEIDEN (HCC): ICD-10-CM

## 2021-11-28 DIAGNOSIS — Z85.038 HISTORY OF COLON CANCER: ICD-10-CM

## 2021-11-28 DIAGNOSIS — Z79.01 ANTICOAGULATED: ICD-10-CM

## 2021-11-28 LAB
ALBUMIN SERPL-MCNC: 3.6 G/DL (ref 3.5–5.2)
ALBUMIN/GLOB SERPL: 1.2 G/DL
ALP SERPL-CCNC: 75 U/L (ref 39–117)
ALT SERPL W P-5'-P-CCNC: 33 U/L (ref 1–33)
ANION GAP SERPL CALCULATED.3IONS-SCNC: 14.2 MMOL/L (ref 5–15)
AST SERPL-CCNC: 17 U/L (ref 1–32)
BASOPHILS # BLD AUTO: 0.02 10*3/MM3 (ref 0–0.2)
BASOPHILS NFR BLD AUTO: 0.3 % (ref 0–1.5)
BILIRUB SERPL-MCNC: 0.2 MG/DL (ref 0–1.2)
BUN SERPL-MCNC: 11 MG/DL (ref 6–20)
BUN/CREAT SERPL: 20 (ref 7–25)
CALCIUM SPEC-SCNC: 9 MG/DL (ref 8.6–10.5)
CHLORIDE SERPL-SCNC: 108 MMOL/L (ref 98–107)
CO2 SERPL-SCNC: 21.8 MMOL/L (ref 22–29)
CREAT SERPL-MCNC: 0.55 MG/DL (ref 0.57–1)
D-LACTATE SERPL-SCNC: 1.7 MMOL/L (ref 0.5–2)
DEPRECATED RDW RBC AUTO: 45.7 FL (ref 37–54)
EOSINOPHIL # BLD AUTO: 0.59 10*3/MM3 (ref 0–0.4)
EOSINOPHIL NFR BLD AUTO: 8.7 % (ref 0.3–6.2)
ERYTHROCYTE [DISTWIDTH] IN BLOOD BY AUTOMATED COUNT: 12.6 % (ref 12.3–15.4)
GFR SERPL CREATININE-BSD FRML MDRD: 121 ML/MIN/1.73
GLOBULIN UR ELPH-MCNC: 3.1 GM/DL
GLUCOSE SERPL-MCNC: 129 MG/DL (ref 65–99)
HCG SERPL QL: NEGATIVE
HCT VFR BLD AUTO: 42.6 % (ref 34–46.6)
HGB BLD-MCNC: 14.9 G/DL (ref 12–15.9)
HOLD SPECIMEN: NORMAL
IMM GRANULOCYTES # BLD AUTO: 0.02 10*3/MM3 (ref 0–0.05)
IMM GRANULOCYTES NFR BLD AUTO: 0.3 % (ref 0–0.5)
LIPASE SERPL-CCNC: 48 U/L (ref 13–60)
LYMPHOCYTES # BLD AUTO: 1.33 10*3/MM3 (ref 0.7–3.1)
LYMPHOCYTES NFR BLD AUTO: 19.6 % (ref 19.6–45.3)
MCH RBC QN AUTO: 34.9 PG (ref 26.6–33)
MCHC RBC AUTO-ENTMCNC: 35 G/DL (ref 31.5–35.7)
MCV RBC AUTO: 99.8 FL (ref 79–97)
MONOCYTES # BLD AUTO: 0.57 10*3/MM3 (ref 0.1–0.9)
MONOCYTES NFR BLD AUTO: 8.4 % (ref 5–12)
NEUTROPHILS NFR BLD AUTO: 4.24 10*3/MM3 (ref 1.7–7)
NEUTROPHILS NFR BLD AUTO: 62.7 % (ref 42.7–76)
NRBC BLD AUTO-RTO: 0.1 /100 WBC (ref 0–0.2)
PLATELET # BLD AUTO: 204 10*3/MM3 (ref 140–450)
PMV BLD AUTO: 9.4 FL (ref 6–12)
POTASSIUM SERPL-SCNC: 3.8 MMOL/L (ref 3.5–5.2)
PROT SERPL-MCNC: 6.7 G/DL (ref 6–8.5)
RBC # BLD AUTO: 4.27 10*6/MM3 (ref 3.77–5.28)
SODIUM SERPL-SCNC: 144 MMOL/L (ref 136–145)
WBC NRBC COR # BLD: 6.77 10*3/MM3 (ref 3.4–10.8)
WHOLE BLOOD HOLD SPECIMEN: NORMAL
WHOLE BLOOD HOLD SPECIMEN: NORMAL

## 2021-11-28 PROCEDURE — 83605 ASSAY OF LACTIC ACID: CPT | Performed by: PHYSICIAN ASSISTANT

## 2021-11-28 PROCEDURE — 84703 CHORIONIC GONADOTROPIN ASSAY: CPT | Performed by: PHYSICIAN ASSISTANT

## 2021-11-28 PROCEDURE — 96374 THER/PROPH/DIAG INJ IV PUSH: CPT

## 2021-11-28 PROCEDURE — 99283 EMERGENCY DEPT VISIT LOW MDM: CPT

## 2021-11-28 PROCEDURE — 87040 BLOOD CULTURE FOR BACTERIA: CPT | Performed by: PHYSICIAN ASSISTANT

## 2021-11-28 PROCEDURE — 83690 ASSAY OF LIPASE: CPT | Performed by: PHYSICIAN ASSISTANT

## 2021-11-28 PROCEDURE — 80053 COMPREHEN METABOLIC PANEL: CPT | Performed by: PHYSICIAN ASSISTANT

## 2021-11-28 PROCEDURE — 81001 URINALYSIS AUTO W/SCOPE: CPT | Performed by: PHYSICIAN ASSISTANT

## 2021-11-28 PROCEDURE — 96375 TX/PRO/DX INJ NEW DRUG ADDON: CPT

## 2021-11-28 PROCEDURE — 85025 COMPLETE CBC W/AUTO DIFF WBC: CPT | Performed by: PHYSICIAN ASSISTANT

## 2021-11-28 RX ORDER — MORPHINE SULFATE 2 MG/ML
4 INJECTION, SOLUTION INTRAMUSCULAR; INTRAVENOUS ONCE
Status: COMPLETED | OUTPATIENT
Start: 2021-11-28 | End: 2021-11-29

## 2021-11-28 RX ORDER — ONDANSETRON 2 MG/ML
4 INJECTION INTRAMUSCULAR; INTRAVENOUS ONCE
Status: DISCONTINUED | OUTPATIENT
Start: 2021-11-28 | End: 2021-11-29

## 2021-11-28 RX ORDER — ONDANSETRON 2 MG/ML
4 INJECTION INTRAMUSCULAR; INTRAVENOUS ONCE
Status: COMPLETED | OUTPATIENT
Start: 2021-11-28 | End: 2021-11-29

## 2021-11-28 RX ORDER — SODIUM CHLORIDE 0.9 % (FLUSH) 0.9 %
10 SYRINGE (ML) INJECTION AS NEEDED
Status: DISCONTINUED | OUTPATIENT
Start: 2021-11-28 | End: 2021-11-29 | Stop reason: HOSPADM

## 2021-11-29 ENCOUNTER — APPOINTMENT (OUTPATIENT)
Dept: CT IMAGING | Facility: HOSPITAL | Age: 41
End: 2021-11-29

## 2021-11-29 VITALS
WEIGHT: 191 LBS | OXYGEN SATURATION: 95 % | HEIGHT: 66 IN | TEMPERATURE: 98.3 F | BODY MASS INDEX: 30.7 KG/M2 | DIASTOLIC BLOOD PRESSURE: 86 MMHG | RESPIRATION RATE: 16 BRPM | HEART RATE: 79 BPM | SYSTOLIC BLOOD PRESSURE: 126 MMHG

## 2021-11-29 LAB
BACTERIA UR QL AUTO: ABNORMAL /HPF
BILIRUB UR QL STRIP: NEGATIVE
CLARITY UR: CLEAR
COD CRY URNS QL: ABNORMAL /HPF
COLOR UR: YELLOW
GLUCOSE UR STRIP-MCNC: NEGATIVE MG/DL
HGB UR QL STRIP.AUTO: NEGATIVE
HYALINE CASTS UR QL AUTO: ABNORMAL /LPF
KETONES UR QL STRIP: NEGATIVE
LEUKOCYTE ESTERASE UR QL STRIP.AUTO: ABNORMAL
MUCOUS THREADS URNS QL MICRO: ABNORMAL /HPF
NITRITE UR QL STRIP: NEGATIVE
PH UR STRIP.AUTO: 6 [PH] (ref 5–8)
PROT UR QL STRIP: ABNORMAL
RBC # UR STRIP: ABNORMAL /HPF
REF LAB TEST METHOD: ABNORMAL
SP GR UR STRIP: >=1.03 (ref 1–1.03)
SQUAMOUS #/AREA URNS HPF: ABNORMAL /HPF
UROBILINOGEN UR QL STRIP: ABNORMAL
WBC # UR STRIP: ABNORMAL /HPF

## 2021-11-29 PROCEDURE — 25010000002 ONDANSETRON PER 1 MG: Performed by: PHYSICIAN ASSISTANT

## 2021-11-29 PROCEDURE — 96374 THER/PROPH/DIAG INJ IV PUSH: CPT

## 2021-11-29 PROCEDURE — 25010000002 MORPHINE PER 10 MG: Performed by: EMERGENCY MEDICINE

## 2021-11-29 PROCEDURE — 74177 CT ABD & PELVIS W/CONTRAST: CPT

## 2021-11-29 PROCEDURE — 25010000002 IOPAMIDOL 61 % SOLUTION: Performed by: EMERGENCY MEDICINE

## 2021-11-29 PROCEDURE — 96375 TX/PRO/DX INJ NEW DRUG ADDON: CPT

## 2021-11-29 RX ORDER — SULFAMETHOXAZOLE AND TRIMETHOPRIM 800; 160 MG/1; MG/1
1 TABLET ORAL 2 TIMES DAILY
Qty: 20 TABLET | Refills: 0 | Status: SHIPPED | OUTPATIENT
Start: 2021-11-29 | End: 2021-12-21

## 2021-11-29 RX ADMIN — IOPAMIDOL 85 ML: 612 INJECTION, SOLUTION INTRAVENOUS at 00:35

## 2021-11-29 RX ADMIN — SODIUM CHLORIDE 1000 ML: 9 INJECTION, SOLUTION INTRAVENOUS at 00:24

## 2021-11-29 RX ADMIN — MORPHINE SULFATE 4 MG: 2 INJECTION, SOLUTION INTRAMUSCULAR; INTRAVENOUS at 00:16

## 2021-11-29 RX ADMIN — ONDANSETRON 4 MG: 2 INJECTION INTRAMUSCULAR; INTRAVENOUS at 00:21

## 2021-12-01 ENCOUNTER — OFFICE VISIT (OUTPATIENT)
Dept: INTERNAL MEDICINE | Facility: CLINIC | Age: 41
End: 2021-12-01

## 2021-12-01 VITALS — HEART RATE: 88 BPM

## 2021-12-01 DIAGNOSIS — Z51.89 VISIT FOR WOUND CARE: Primary | ICD-10-CM

## 2021-12-01 DIAGNOSIS — R00.0 TACHYCARDIA: Chronic | ICD-10-CM

## 2021-12-01 DIAGNOSIS — B37.2 CANDIDAL INTERTRIGO: ICD-10-CM

## 2021-12-01 PROCEDURE — 99213 OFFICE O/P EST LOW 20 MIN: CPT | Performed by: NURSE PRACTITIONER

## 2021-12-01 RX ORDER — NYSTATIN 100000 U/G
CREAM TOPICAL 2 TIMES DAILY PRN
Qty: 30 G | Refills: 2 | Status: SHIPPED | OUTPATIENT
Start: 2021-12-01 | End: 2022-04-12

## 2021-12-01 NOTE — PROGRESS NOTES
Chief Complaint  Dressing Change     Subjective:      History of Present Illness {CC  Problem List  Visit  Diagnosis   Encounters  Notes  Medications  Labs  Result Review Imaging  Media :23}     Carole Weaver presents to [facility] for:     1) wound check and dressing change left abdomen. Noticed over wk end when stretching to place something in kitchen. Went to ER - CT. Had I&D. (notes reviewed)   On Bactrim.  Feels much better.        2) intertrigo: breast, recurrent.  Nystatin helped in past.     3) tachycardia: since last visit, seen by Dr Bustos.  Started 12.5 mg toprol nightly. HR improved.       I have reviewed patient's medical history, any new submitted information provided by patient or medical assistant and updated medical record.      Objective:      Physical Exam  Vitals reviewed.   Constitutional:       Appearance: Normal appearance. She is well-developed.   HENT:      Head:      Comments: Wearing mask due to COVID   Cardiovascular:      Rate and Rhythm: Normal rate and regular rhythm.      Pulses: Normal pulses.      Heart sounds: Normal heart sounds.   Pulmonary:      Effort: Pulmonary effort is normal.      Breath sounds: Normal breath sounds.      Comments: E/U   Abdominal:       Skin:     General: Skin is warm and dry.   Neurological:      Mental Status: She is alert and oriented to person, place, and time.   Psychiatric:         Mood and Affect: Mood normal.         Behavior: Behavior normal. Behavior is cooperative.         Thought Content: Thought content normal.         Judgment: Judgment normal.        Result Review  Data Reviewed:{ Labs  Result Review  Imaging  Med Tab  Media :23}                Vital Signs:   Pulse 88         Requested Prescriptions     Signed Prescriptions Disp Refills   • nystatin (MYCOSTATIN) 043067 UNIT/GM cream 30 g 2     Sig: Apply  topically to the appropriate area as directed 2 (Two) Times a Day As Needed  (rash).       Routine medications provided by this office will also be refilled via pharmacy request.       Current Outpatient Medications:   •  clonazePAM (KlonoPIN) 1 MG tablet, TAKE 1 TABLET BY MOUTH THREE TIMES A DAY AS NEEDED FOR ANXIETY OR SEIZURES, Disp: 90 tablet, Rfl: 0  •  Cyanocobalamin (VITAMIN B-12 PO), Take  by mouth., Disp: , Rfl:   •  dicyclomine (BENTYL) 20 MG tablet, TAKE 1 TABLET BY MOUTH EVERY 6 HOURS AS NEEDED, Disp: 360 tablet, Rfl: 0  •  diphenoxylate-atropine (LOMOTIL) 2.5-0.025 MG per tablet, TAKE 1 TABLET BY MOUTH 4 (FOUR) TIMES A DAY AS NEEDED FOR DIARRHEA., Disp: 120 tablet, Rfl: 1  •  enoxaparin (LOVENOX) 100 MG/ML solution syringe, INJECT 0.9 ML UNDER THE SKIN INTO THE APPROPRIATE AREA AS DIRECTED EVERY 12 (TWELVE) HOURS., Disp: 60 each, Rfl: 2  •  escitalopram (LEXAPRO) 10 MG tablet, TAKE 1 TABLET BY MOUTH EVERY DAY, Disp: 30 tablet, Rfl: 5  •  famotidine (PEPCID) 20 MG tablet, TAKE 1 TABLET BY MOUTH TWICE A DAY, Disp: 180 tablet, Rfl: 1  •  fluorouracil in dextrose 5 % 250 mL infusion, Infuse  into a venous catheter 1 (One) Time. Takes twice a month., Disp: , Rfl:   •  Loratadine (CLARITIN) 10 MG capsule, Take 10 mg by mouth Every Evening., Disp: , Rfl:   •  metoprolol succinate XL (TOPROL-XL) 25 MG 24 hr tablet, Take 0.5 tablets by mouth Every Night., Disp: 45 tablet, Rfl: 3  •  mineral oil-hydrophilic petrolatum (AQUAPHOR) ointment, Apply 1 application topically to the appropriate area as directed As Needed for Dry Skin., Disp: , Rfl:   •  nystatin (MYCOSTATIN) 691911 UNIT/GM cream, Apply  topically to the appropriate area as directed 2 (Two) Times a Day As Needed (rash)., Disp: 30 g, Rfl: 2  •  ondansetron (ZOFRAN) 8 MG tablet, TAKE 1 TABLET BY MOUTH 3 (THREE) TIMES A DAY AS NEEDED FOR NAUSEA OR VOMITING., Disp: 60 tablet, Rfl: 0  •  prochlorperazine (COMPAZINE) 10 MG tablet, Take 1 tablet by mouth Every 6 (Six) Hours As Needed for Nausea or Vomiting., Disp: 30 tablet, Rfl: 2  •   promethazine (PHENERGAN) 25 MG tablet, Take 1 tablet by mouth Every 6 (Six) Hours As Needed for Nausea or Vomiting., Disp: 60 tablet, Rfl: 2  •  sulfamethoxazole-trimethoprim (BACTRIM DS,SEPTRA DS) 800-160 MG per tablet, Take 1 tablet by mouth 2 (Two) Times a Day., Disp: 20 tablet, Rfl: 0     Assessment and Plan:      Assessment and Plan {CC Problem List  Visit Diagnosis  ROS  Review (Popup)  Health Maintenance  Quality  BestPractice  Medications  SmartSets  SnapShot Encounters  Media :23}     Problem List Items Addressed This Visit        Cardiac and Vasculature    Tachycardia (Chronic)    Current Assessment & Plan     Improved on toprol            Other Visit Diagnoses     Visit for wound care    -  Primary    Candidal intertrigo        Under breast - recurrent .  Sent nystatin.     Relevant Medications    nystatin (MYCOSTATIN) 823736 UNIT/GM cream        She will remove packing tomorrow.   Then switch to changing dressing twice a day.  Monitoring drainage amount, consistency.     She will notify me if worsens.     Follow Up {Instructions Charge Capture  Follow-up Communications :23}     Return if symptoms worsen or fail to improve.    Patient was given instructions and counseling regarding her condition or for health maintenance advice. Please see specific information pulled into the AVS if appropriate.    Dragon disclaimer:   Much of this encounter note is an electronic transcription/translation of spoken language to printed text. The electronic translation of spoken language may permit erroneous, or at times, nonsensical words or phrases to be inadvertently transcribed; Although I have reviewed the note for such errors, some may still exist.     Additional Patient Counseling:       There are no Patient Instructions on file for this visit.

## 2021-12-03 DIAGNOSIS — F33.9 MONOPOLAR DEPRESSION (HCC): ICD-10-CM

## 2021-12-03 LAB
BACTERIA SPEC AEROBE CULT: NORMAL
BACTERIA SPEC AEROBE CULT: NORMAL

## 2021-12-03 RX ORDER — ESCITALOPRAM OXALATE 20 MG/1
TABLET ORAL
Qty: 90 TABLET | Refills: 3 | Status: SHIPPED | OUTPATIENT
Start: 2021-12-03 | End: 2022-10-19 | Stop reason: ALTCHOICE

## 2021-12-07 ENCOUNTER — APPOINTMENT (OUTPATIENT)
Dept: WOMENS IMAGING | Facility: HOSPITAL | Age: 41
End: 2021-12-07

## 2021-12-07 PROCEDURE — 77067 SCR MAMMO BI INCL CAD: CPT | Performed by: RADIOLOGY

## 2021-12-07 PROCEDURE — 77063 BREAST TOMOSYNTHESIS BI: CPT | Performed by: RADIOLOGY

## 2021-12-14 ENCOUNTER — HOSPITAL ENCOUNTER (OUTPATIENT)
Dept: PET IMAGING | Facility: HOSPITAL | Age: 41
Discharge: HOME | End: 2021-12-14
Admitting: INTERNAL MEDICINE

## 2021-12-14 ENCOUNTER — INFUSION (OUTPATIENT)
Dept: ONCOLOGY | Facility: HOSPITAL | Age: 41
End: 2021-12-14

## 2021-12-14 DIAGNOSIS — E61.1 IRON DEFICIENCY: ICD-10-CM

## 2021-12-14 DIAGNOSIS — C20 ADENOCARCINOMA OF RECTUM (HCC): ICD-10-CM

## 2021-12-14 DIAGNOSIS — C78.00 MALIGNANT NEOPLASM METASTATIC TO LUNG, UNSPECIFIED LATERALITY (HCC): ICD-10-CM

## 2021-12-14 LAB
ALBUMIN SERPL-MCNC: 4 G/DL (ref 3.5–5.2)
ALBUMIN/GLOB SERPL: 1.3 G/DL (ref 1.1–2.4)
ALP SERPL-CCNC: 87 U/L (ref 38–116)
ALT SERPL W P-5'-P-CCNC: 44 U/L (ref 0–33)
ANION GAP SERPL CALCULATED.3IONS-SCNC: 9.7 MMOL/L (ref 5–15)
AST SERPL-CCNC: 29 U/L (ref 0–32)
BASOPHILS # BLD AUTO: 0.03 10*3/MM3 (ref 0–0.2)
BASOPHILS NFR BLD AUTO: 0.5 % (ref 0–1.5)
BILIRUB SERPL-MCNC: 0.2 MG/DL (ref 0.2–1.2)
BUN SERPL-MCNC: 13 MG/DL (ref 6–20)
BUN/CREAT SERPL: 17.3 (ref 7.3–30)
CALCIUM SPEC-SCNC: 9 MG/DL (ref 8.5–10.2)
CEA SERPL-MCNC: 1.55 NG/ML
CHLORIDE SERPL-SCNC: 106 MMOL/L (ref 98–107)
CO2 SERPL-SCNC: 21.3 MMOL/L (ref 22–29)
CREAT BLDA-MCNC: 0.7 MG/DL (ref 0.6–1.3)
CREAT SERPL-MCNC: 0.75 MG/DL (ref 0.6–1.1)
DEPRECATED RDW RBC AUTO: 49 FL (ref 37–54)
EOSINOPHIL # BLD AUTO: 0.49 10*3/MM3 (ref 0–0.4)
EOSINOPHIL NFR BLD AUTO: 7.8 % (ref 0.3–6.2)
ERYTHROCYTE [DISTWIDTH] IN BLOOD BY AUTOMATED COUNT: 13.2 % (ref 12.3–15.4)
FERRITIN SERPL-MCNC: 296.3 NG/ML (ref 11–207)
GFR SERPL CREATININE-BSD FRML MDRD: 85 ML/MIN/1.73
GLOBULIN UR ELPH-MCNC: 3.1 GM/DL (ref 1.8–3.5)
GLUCOSE SERPL-MCNC: 99 MG/DL (ref 74–124)
HCT VFR BLD AUTO: 45.9 % (ref 34–46.6)
HGB BLD-MCNC: 15.2 G/DL (ref 12–15.9)
IMM GRANULOCYTES # BLD AUTO: 0.02 10*3/MM3 (ref 0–0.05)
IMM GRANULOCYTES NFR BLD AUTO: 0.3 % (ref 0–0.5)
IRON 24H UR-MRATE: 104 MCG/DL (ref 37–145)
IRON SATN MFR SERPL: 25 % (ref 14–48)
LYMPHOCYTES # BLD AUTO: 0.92 10*3/MM3 (ref 0.7–3.1)
LYMPHOCYTES NFR BLD AUTO: 14.6 % (ref 19.6–45.3)
MCH RBC QN AUTO: 33.2 PG (ref 26.6–33)
MCHC RBC AUTO-ENTMCNC: 33.1 G/DL (ref 31.5–35.7)
MCV RBC AUTO: 100.2 FL (ref 79–97)
MONOCYTES # BLD AUTO: 0.51 10*3/MM3 (ref 0.1–0.9)
MONOCYTES NFR BLD AUTO: 8.1 % (ref 5–12)
NEUTROPHILS NFR BLD AUTO: 4.34 10*3/MM3 (ref 1.7–7)
NEUTROPHILS NFR BLD AUTO: 68.7 % (ref 42.7–76)
NRBC BLD AUTO-RTO: 0 /100 WBC (ref 0–0.2)
PLATELET # BLD AUTO: 221 10*3/MM3 (ref 140–450)
PMV BLD AUTO: 8.9 FL (ref 6–12)
POTASSIUM SERPL-SCNC: 4.6 MMOL/L (ref 3.5–4.7)
PROT SERPL-MCNC: 7.1 G/DL (ref 6.3–8)
RBC # BLD AUTO: 4.58 10*6/MM3 (ref 3.77–5.28)
SODIUM SERPL-SCNC: 137 MMOL/L (ref 134–145)
TIBC SERPL-MCNC: 424 MCG/DL (ref 249–505)
TRANSFERRIN SERPL-MCNC: 303 MG/DL (ref 200–360)
WBC NRBC COR # BLD: 6.31 10*3/MM3 (ref 3.4–10.8)

## 2021-12-14 PROCEDURE — 25010000002 IOPAMIDOL 61 % SOLUTION: Performed by: INTERNAL MEDICINE

## 2021-12-14 PROCEDURE — 80053 COMPREHEN METABOLIC PANEL: CPT

## 2021-12-14 PROCEDURE — 82728 ASSAY OF FERRITIN: CPT

## 2021-12-14 PROCEDURE — 71260 CT THORAX DX C+: CPT

## 2021-12-14 PROCEDURE — 84466 ASSAY OF TRANSFERRIN: CPT

## 2021-12-14 PROCEDURE — 85025 COMPLETE CBC W/AUTO DIFF WBC: CPT

## 2021-12-14 PROCEDURE — 83540 ASSAY OF IRON: CPT

## 2021-12-14 PROCEDURE — 82565 ASSAY OF CREATININE: CPT

## 2021-12-14 PROCEDURE — 82378 CARCINOEMBRYONIC ANTIGEN: CPT | Performed by: INTERNAL MEDICINE

## 2021-12-14 RX ADMIN — IOPAMIDOL 75 ML: 612 INJECTION, SOLUTION INTRAVENOUS at 09:05

## 2021-12-16 ENCOUNTER — TELEPHONE (OUTPATIENT)
Dept: ONCOLOGY | Facility: CLINIC | Age: 41
End: 2021-12-16

## 2021-12-16 NOTE — TELEPHONE ENCOUNTER
Patient called back and verbalized concerns about CT scan results on MY CHART. Reviewed CT scans with Dr Nguyen and he stated scans are stable and no new concerns.    Patient v/u    Attempted to call Left message

## 2021-12-16 NOTE — TELEPHONE ENCOUNTER
Caller: Carole Weaver    Relationship: Self    Best call back number: [phone]    Caller requesting test results: PATIENT     What test was performed: CT SCAN    When was the test performed: 12/14/21    Where was the test performed: [facility]     Additional notes: PATIENT HAS REVIEWED HER CT RESULTS ON MY CHART AND IS CONCERNED. SHE WOULD LIKE FOR DR. HUNT TO CALL HER AS SOON AS POSSIBLE TO DISCUSS.

## 2021-12-21 ENCOUNTER — OFFICE VISIT (OUTPATIENT)
Dept: ONCOLOGY | Facility: CLINIC | Age: 41
End: 2021-12-21

## 2021-12-21 ENCOUNTER — INFUSION (OUTPATIENT)
Dept: ONCOLOGY | Facility: HOSPITAL | Age: 41
End: 2021-12-21

## 2021-12-21 ENCOUNTER — LAB (OUTPATIENT)
Dept: ONCOLOGY | Facility: HOSPITAL | Age: 41
End: 2021-12-21

## 2021-12-21 VITALS
WEIGHT: 198 LBS | SYSTOLIC BLOOD PRESSURE: 123 MMHG | OXYGEN SATURATION: 95 % | BODY MASS INDEX: 31.82 KG/M2 | HEIGHT: 66 IN | DIASTOLIC BLOOD PRESSURE: 84 MMHG | TEMPERATURE: 97.1 F | RESPIRATION RATE: 20 BRPM | HEART RATE: 98 BPM

## 2021-12-21 DIAGNOSIS — D68.51 FACTOR V LEIDEN MUTATION (HCC): ICD-10-CM

## 2021-12-21 DIAGNOSIS — C78.02 MALIGNANT NEOPLASM METASTATIC TO BOTH LUNGS (HCC): ICD-10-CM

## 2021-12-21 DIAGNOSIS — C78.01 MALIGNANT NEOPLASM METASTATIC TO BOTH LUNGS (HCC): ICD-10-CM

## 2021-12-21 DIAGNOSIS — C20 ADENOCARCINOMA OF RECTUM (HCC): ICD-10-CM

## 2021-12-21 DIAGNOSIS — I82.210 THROMBOSIS OF SUPERIOR VENA CAVA (HCC): ICD-10-CM

## 2021-12-21 DIAGNOSIS — I26.99 PULMONARY EMBOLISM WITHOUT ACUTE COR PULMONALE, UNSPECIFIED CHRONICITY, UNSPECIFIED PULMONARY EMBOLISM TYPE (HCC): ICD-10-CM

## 2021-12-21 DIAGNOSIS — Z79.01 CHRONIC ANTICOAGULATION: Chronic | ICD-10-CM

## 2021-12-21 DIAGNOSIS — C20 ADENOCARCINOMA OF RECTUM (HCC): Primary | ICD-10-CM

## 2021-12-21 DIAGNOSIS — Z45.2 ENCOUNTER FOR FITTING AND ADJUSTMENT OF VASCULAR CATHETER: Primary | ICD-10-CM

## 2021-12-21 PROBLEM — D70.1 CHEMOTHERAPY INDUCED NEUTROPENIA (HCC): Status: RESOLVED | Noted: 2020-04-01 | Resolved: 2021-12-21

## 2021-12-21 PROBLEM — T45.1X5A CHEMOTHERAPY INDUCED NEUTROPENIA: Status: RESOLVED | Noted: 2020-04-01 | Resolved: 2021-12-21

## 2021-12-21 LAB
ALBUMIN SERPL-MCNC: 4.1 G/DL (ref 3.5–5.2)
ALBUMIN/GLOB SERPL: 1.2 G/DL (ref 1.1–2.4)
ALP SERPL-CCNC: 88 U/L (ref 38–116)
ALT SERPL W P-5'-P-CCNC: 55 U/L (ref 0–33)
ANION GAP SERPL CALCULATED.3IONS-SCNC: 10.8 MMOL/L (ref 5–15)
AST SERPL-CCNC: 34 U/L (ref 0–32)
BASOPHILS # BLD AUTO: 0.01 10*3/MM3 (ref 0–0.2)
BASOPHILS NFR BLD AUTO: 0.2 % (ref 0–1.5)
BILIRUB SERPL-MCNC: 0.4 MG/DL (ref 0.2–1.2)
BILIRUB UR QL STRIP: NEGATIVE
BUN SERPL-MCNC: 9 MG/DL (ref 6–20)
BUN/CREAT SERPL: 11.3 (ref 7.3–30)
CALCIUM SPEC-SCNC: 9.6 MG/DL (ref 8.5–10.2)
CHLORIDE SERPL-SCNC: 103 MMOL/L (ref 98–107)
CLARITY UR: CLEAR
CO2 SERPL-SCNC: 25.2 MMOL/L (ref 22–29)
COLOR UR: YELLOW
CREAT SERPL-MCNC: 0.8 MG/DL (ref 0.6–1.1)
DEPRECATED RDW RBC AUTO: 49 FL (ref 37–54)
EOSINOPHIL # BLD AUTO: 0.28 10*3/MM3 (ref 0–0.4)
EOSINOPHIL NFR BLD AUTO: 5.2 % (ref 0.3–6.2)
ERYTHROCYTE [DISTWIDTH] IN BLOOD BY AUTOMATED COUNT: 13.1 % (ref 12.3–15.4)
GFR SERPL CREATININE-BSD FRML MDRD: 79 ML/MIN/1.73
GLOBULIN UR ELPH-MCNC: 3.4 GM/DL (ref 1.8–3.5)
GLUCOSE SERPL-MCNC: 113 MG/DL (ref 74–124)
GLUCOSE UR STRIP-MCNC: NEGATIVE MG/DL
HCT VFR BLD AUTO: 49 % (ref 34–46.6)
HGB BLD-MCNC: 15.9 G/DL (ref 12–15.9)
HGB UR QL STRIP.AUTO: NEGATIVE
IMM GRANULOCYTES # BLD AUTO: 0.01 10*3/MM3 (ref 0–0.05)
IMM GRANULOCYTES NFR BLD AUTO: 0.2 % (ref 0–0.5)
KETONES UR QL STRIP: NEGATIVE
LEUKOCYTE ESTERASE UR QL STRIP.AUTO: ABNORMAL
LYMPHOCYTES # BLD AUTO: 0.98 10*3/MM3 (ref 0.7–3.1)
LYMPHOCYTES NFR BLD AUTO: 18.3 % (ref 19.6–45.3)
MCH RBC QN AUTO: 32.8 PG (ref 26.6–33)
MCHC RBC AUTO-ENTMCNC: 32.4 G/DL (ref 31.5–35.7)
MCV RBC AUTO: 101 FL (ref 79–97)
MONOCYTES # BLD AUTO: 0.35 10*3/MM3 (ref 0.1–0.9)
MONOCYTES NFR BLD AUTO: 6.5 % (ref 5–12)
NEUTROPHILS NFR BLD AUTO: 3.73 10*3/MM3 (ref 1.7–7)
NEUTROPHILS NFR BLD AUTO: 69.6 % (ref 42.7–76)
NITRITE UR QL STRIP: NEGATIVE
NRBC BLD AUTO-RTO: 0 /100 WBC (ref 0–0.2)
PH UR STRIP.AUTO: 6 [PH] (ref 4.5–8)
PLATELET # BLD AUTO: 218 10*3/MM3 (ref 140–450)
PMV BLD AUTO: 8.6 FL (ref 6–12)
POTASSIUM SERPL-SCNC: 3.8 MMOL/L (ref 3.5–4.7)
PROT SERPL-MCNC: 7.5 G/DL (ref 6.3–8)
PROT UR QL STRIP: ABNORMAL
RBC # BLD AUTO: 4.85 10*6/MM3 (ref 3.77–5.28)
SODIUM SERPL-SCNC: 139 MMOL/L (ref 134–145)
SP GR UR STRIP: >=1.03 (ref 1–1.03)
UROBILINOGEN UR QL STRIP: ABNORMAL
WBC NRBC COR # BLD: 5.36 10*3/MM3 (ref 3.4–10.8)

## 2021-12-21 PROCEDURE — 36593 DECLOT VASCULAR DEVICE: CPT

## 2021-12-21 PROCEDURE — 25010000002 ALTEPLASE 2 MG RECONSTITUTED SOLUTION: Performed by: INTERNAL MEDICINE

## 2021-12-21 PROCEDURE — 99215 OFFICE O/P EST HI 40 MIN: CPT | Performed by: INTERNAL MEDICINE

## 2021-12-21 PROCEDURE — 81003 URINALYSIS AUTO W/O SCOPE: CPT

## 2021-12-21 PROCEDURE — 80053 COMPREHEN METABOLIC PANEL: CPT

## 2021-12-21 PROCEDURE — 25010000002 HEPARIN LOCK FLUSH PER 10 UNITS: Performed by: INTERNAL MEDICINE

## 2021-12-21 PROCEDURE — 85025 COMPLETE CBC W/AUTO DIFF WBC: CPT

## 2021-12-21 RX ORDER — SODIUM CHLORIDE 0.9 % (FLUSH) 0.9 %
10 SYRINGE (ML) INJECTION AS NEEDED
Status: CANCELLED | OUTPATIENT
Start: 2021-12-21

## 2021-12-21 RX ORDER — ENOXAPARIN SODIUM 300 MG/3ML
INJECTION INTRAVENOUS; SUBCUTANEOUS SEE ADMIN INSTRUCTIONS
COMMUNITY
End: 2022-03-23 | Stop reason: SDUPTHER

## 2021-12-21 RX ORDER — HEPARIN SODIUM (PORCINE) LOCK FLUSH IV SOLN 100 UNIT/ML 100 UNIT/ML
500 SOLUTION INTRAVENOUS AS NEEDED
Status: CANCELLED | OUTPATIENT
Start: 2021-12-21

## 2021-12-21 RX ORDER — HEPARIN SODIUM (PORCINE) LOCK FLUSH IV SOLN 100 UNIT/ML 100 UNIT/ML
500 SOLUTION INTRAVENOUS AS NEEDED
Status: DISCONTINUED | OUTPATIENT
Start: 2021-12-21 | End: 2021-12-21 | Stop reason: HOSPADM

## 2021-12-21 RX ADMIN — Medication 500 UNITS: at 09:40

## 2021-12-21 RX ADMIN — ALTEPLASE: 2.2 INJECTION, POWDER, LYOPHILIZED, FOR SOLUTION INTRAVENOUS at 08:46

## 2021-12-21 NOTE — PROGRESS NOTES
REASON FOR FOLLOW UP:     1. Rectal cancer, rectal ultrasound examination in July 2019 reported T3N0 disease without lymphadenopathy. She does have small pulmonary nodule 6-7 mm and 2 mm with indeterminate feature. There is no solid evidence of metastatic disease otherwise. Patient has stage IIA (T3N0M0) disease.  2. The patient is heterozygous factor V Leiden, was on prophylactic anticoagulation with Lovenox 40 mg daily given her increased risk of thrombosis with MediPort and GI malignancy.   3. PET scan on 8/8/2019 reported an 8 mm hypermetabolic right upper lobe pulmonary nodule, which is suspicious for metastatic as well.    4. Patient had a PowerPort placement on the left upper chest by Dr. Joseph on 8/9/2019.  5. Patient was started on concurrent infusional 5-FU chemoradiation therapy on 8/12/2019, with planned complete surgical resection and further adjuvant chemotherapy with intention to cure the disease.   6. Patient underwent surgical resection of the primary rectal cancer by Dr. Ye on 12/2/2019.  Pathology evaluation reported residual T3N1 disease stage IIIb.  7. Diarrhea related to therapy and radiation.   8. Patient was started cycle 1 day 1 of adjuvant FOLFOX 6 on 1/23/2020.  9. On 2/5/2020 FOLFOX 6 cycle 2 delayed secondary to neutropenia.  Patient was given 3 days of Granix injection.  After cycle #2 we planned 3 days of Granix with each cycle.   10. Patient also intolerant of oral iron.  Patient received 2 doses of IV injectafer on 02/05/2020 and 02/12/2020.   11. 02/12/2020 Proceed with cycle #2 of FOLFOX 6 with 3 days of Granix.  12. On 3/11/2020 cycle 4 postponed secondary to abdominal pain and occasional low-grade fevers.  CT scans ordered.  13. Cycle #4 resumed after CT scan revealed no evidence of disease.  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 Ye. patient has no fever.  Continue to observe.   14. Grade 3  hand-foot syndrome secondary to 5-FU after cycle #7 FOLFOX 6 chemotherapy, prompting ER visit.  Also has worsening peripheral neuropathy.   15. Cycle #8 FOLFOX 6 was given on 5/13/2020, with 50% dose reduction for both 5-FU and oxaliplatin, and also examination of bolus 5-FU.   16. PET scan examination on 5/21/2020 reported no evidence of metastatic disease in the chest abdomen pelvis.  Stable 6 mm RUL pulmonary nodule.  17. On 5/27/2020, I discussed with the patient that we can discontinue chemotherapy at this time.   18. Patient had a surgical procedure for low anterior colon resection, coloanal anastomosis on 8/3/2020.  19. CT scan for chest abdomen pelvis on 9/9/2020 reported a new 8 mm noncalcified pulmonary nodule in the left lower lobe of the lung.  Otherwise stable right upper lobe 6 mm pulmonary nodule, and no evidence of disease recurrence in the abdomen or pelvis.  20. PET scan examination on 9/18/2020 reported multiple hypermetabolic small pulmonary nodules/ new pulmonary nodules.   21. Patient was started on pelvic chemotherapy with FOLFIRI regimen on 10/13/2020.   22. 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 It reported tumor mutation burden 5 Muts/Mb, microsatellite stable, TP53 mutation R282W, and others.   23. Cycle #5 was without irinotecan, due to peripheral neuropathy.  24. 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  Patient was switched to weight-based Lovenox injection.  25. Cycle #6 5-FU and irinotecan was delayed by 2 weeks because of the above incident.  26. Patient had a telemedicine evaluation at that the [facility] [place] in February 2021.  They agreed with our treatment plan for palliative chemotherapy followed by maintenance chemotherapy.    27. PET scan examination on 4/6/2021 after cycle #12 palliative chemotherapy reported no  evidence of hypermetabolic metastatic lesion.   28. Patient was started on maintenance chemotherapy with 5-FU and Avastin on 4/13/2021. (Unable to tolerate Xeloda because of a significant hand-foot syndrome).   29. PET scan on 9/24/2021 obtained after cycle #12 maintenance chemotherapy with 5-FU, leucovorin, Avastin reported no evidence for active disease. We recommended 3 months chemotherapy holiday.      HISTORY OF PRESENT ILLNESS:  The patient is a 41 y.o. female the above-mentioned history who is here today for lab review and evaluation to discuss the results of CT scan examination for chest abdomen pelvis obtained recently.    Patient reports recently she had a ER visit being in the November 2021 because of pain in the swelling in the left abdominal wall. She went to ER, had CT of the abdomen pelvis with IV contrast. The study reported subcutaneous changes in the abdomen wall at the site of injection. He needed this patient had a abscess in the left lower quadrant abdomen wall which was drained in the ER and she was started on antibiotics.    Patient reports the wound has been healing well. No active drainage. She denies fever sweating or chills.     Patient is at her baseline condition. Denies fever sweating chills. No evidence of GI bleeding, she continues to have colostomy in place. Denies dysuria or hematuria. She continues on Lovenox anticoagulation.      She denies other concerns this time.    Patient had CT scan for chest with IV contrast obtained on 12/14/2021 which reported small tiny stable pulmonary nodules. There is a 4 mm right upper lobe pulmonary nodule. There is also a stable 4 mm left lower lobe pulmonary nodule. There is also stable left upper lobe micronodule.     Laboratory studies today on 12/21/2021 reported normal hemoglobin 15.9 however .0, platelets 218,000 WBC 5360 including neutrophils 3730 lymphocytes 980. Chemistry lab reported normal renal function, electrolytes, glucose,  and marginally elevated ALT 55, AST 34 but normal total bilirubin and alk phosphatase.       Past Medical History:   Diagnosis Date   • Abdominopelvic abscess (HCC) 08/12/2020    ADMITTED TO [facility]   • Abnormal Pap smear of cervix 02/02/1998    JULIUS I   • Anemia in pregnancy    • Anxiety    • Bilateral epiphora 04/2020   • Bilateral hand swelling 05/02/2020    SEEN AT [facility] ER   • Cervical lymphadenitis 06/06/2012    SEEN AT [facility] ER   • Chemotherapy induced diarrhea 01/2021   • Chemotherapy induced neutropenia (HCC) 04/2020   • Chemotherapy-induced nausea 02/2020   • Chemotherapy-induced thrombocytopenia 05/2020   • Chronic diarrhea    • Colon polyps     FOLLOWED BY DR. GERONIMO ESPARZA   • Cystitis 04/24/2020    WITH DEHYDRATION, ADMITTED TO [facility]   • Cystitis 10/27/2020   • Depression    • Drug-induced peripheral neuropathy ([facility]) 05/2020   • Factor V Leiden mutation ([facility])    • Fistula of intestine    • GERD (gastroesophageal reflux disease)    • Hand foot syndrome 05/2020   • Heart murmur     IN CHILDHOOD   • Hematochezia    • Hemorrhoids    • Heterozygous factor V Leiden mutation ([facility])     DX 8-2-2019   • History of anemia    • History of chemotherapy 2019    FOLLOWED BY DR. ALEXANDRU HUNT   • History of gestational diabetes    • History of pre-eclampsia 1998   • History of radiation therapy 2019    FOLLOWED BY DR. JAVON LEWIS   • History of TB skin testing 01/17/2009    TB Skin Test   • HPV in female 1998   • Hypokalemia 09/2019   • Hypomagnesemia 09/2019   • Hyponatremia 06/2021   • IBS (irritable bowel syndrome)    • Ileostomy in place ([facility])     FOLLOWED BY DR. 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   • Pulmonary nodule, right 2020   • Rectal cancer (HCC) 2019    STAGE IIA, INVASIVE MODERATELY DIFFERENTIATED ADENOCARCINOMA, CHEMO AND XRT FINISHED 2019   • Right shoulder pain 2020    SEEN AT [facility] ER   • Right ureteral stone 10/01/2019    SEEN AT [facility] ER   • SAB (spontaneous ) 1996     A2-1 INDUCED   • Sciatica    • Sepsis due to cellulitis (HCC) 2002    LEFT GREAT TOE, ADMITTED TO [facility]   • Tachycardia 2020   • Thrombosis of superior vena cava (HCC) 2020    AND BRACHIOCEPHALIC VEIN, ADMITTED TO [facility]   • Urinary urgency 2020     Past Surgical History:   Procedure Laterality Date   • ABDOMINAL SURGERY     • CHOLECYSTECTOMY N/A 10/10/2003    LAPAROSCOPIC WITH CHOLANGIOGRAM, DR. JAMEY TALAVERA AT [facility]   • COLON RESECTION N/A 2019    Procedure: laparoscopic low anterior colon resection with mobilization of splenic flexure and diverting loop ileostomy: ERAS;  Surgeon: Padmaja Esparza MD;  Location: [facility];  Service: General   • COLON RESECTION N/A 8/3/2020    Procedure: LOW ANTERIOR COLON RESECTION, COLOANAL ANASTOMOSIS, MOBILIZATION SPLENIC FLEXURE;  Surgeon: Padmaja Esparza MD;  Location: [facility];  Service: General;  Laterality: N/A;   • COLONOSCOPY N/A 7/15/2020    PATENT ANASTAMOSIS IN MID RECTUM, RESCOPE IN 1 YR, DR. PADMAJA ESPARZA AT [facility]   • COLONOSCOPY W/ POLYPECTOMY N/A 2019    15 MM TUBULOVILLOUS ADENOMA POLYP IN HEPATIC FLEXURE, 20 MMTUBULOVILLOUS ADENOMA WITH HIGH GRADE DYSPLASIA IN RECTOSIGMOID, 4 CM MASS IN MID RECTUM, PATH: INVASIVE MODERATELY DIFFERENTIATED ADENOCARCINOMA, DR. JENNIFER LI AT [facility]   • DILATATION AND EVACUATION N/A    • ENDOSCOPY N/A 2019    LA GRADE A ESOPHAGITIS, GASTRITIS, ALL BIOPSIES BENIGN, DR. JENNIFER LI AT [facility]   • INCISION AND  DRAINAGE PERIRECTAL ABSCESS N/A 8/14/2020    Procedure: INCISION AND DRAINAGE OF retrorectal dehiscence abcess with drain placement and irrigation;  Surgeon: Geronimo Ye MD;  Location: [facility] MAIN OR;  Service: General;  Laterality: N/A;   • PAP SMEAR N/A 01/24/2014   • SIGMOIDOSCOPY N/A 7/24/2019    INFILTRATIVE PARTIALLY OBSTRUCTING LARGE RECTAL CANCER, AREA TATOOED, DR. GERONIMO YE AT [facility]   • SIGMOIDOSCOPY N/A 11/23/2019    AN ULCERATED PARTIALLY OBSTRUCTING MASS IN MID RECTUM, AREA TATTOOED, DR. GERONIMO YE AT [facility]   • SIGMOIDOSCOPY N/A 7/22/2021    PATENT ANASTAMOSIS IN DISTAL RECTUM, RESCOPE IN 1 YR, DR. GERONIMO YE AT [facility]   • TRANSRECTAL ULTRASOUND N/A 7/24/2019    Procedure: ULTRASOUND TRANSRECTAL;  Surgeon: Geronimo Ye MD;  Location: [facility] ENDOSCOPY;  Service: General   • URETEROSCOPY LASER LITHOTRIPSY WITH STENT INSERTION Right 10/30/2019    DR. ESTUARDO BEASLEY AT [place]   • VAGINAL DELIVERY N/A 09/18/1998   • VENOUS ACCESS DEVICE (PORT) INSERTION Left 8/9/2019    Procedure: INSERTION VENOUS ACCESS DEVICE;  Surgeon: Sj Joseph MD;  [place] [facility] [place] [facility] [place];  Service: General   • VENOUS ACCESS DEVICE (PORT) INSERTION N/A 12/18/2020    Procedure: INSERTION VENOUS ACCESS DEVICE right side, removal venous access device left side;  Surgeon: Sj Joseph MD;  Location: [facility] OR [facility];  Service: General;  Laterality: N/A;   • WISDOM TOOTH EXTRACTION Bilateral 1993       HEMATOLOGIC/ONCOLOGIC HISTORY:      The patient reports she has intermittent rectal bleeding and urgency, mucous with her stool, starting sometime in 2016. At that time she was referred to GI service, and was diagnosed of irritable bowel syndrome. Nevertheless she had worsening urgency for bowel movement, and had a couple of incidents losing control of stool. She was recently seen by Roland Thorpe MD again and had colonoscopy and EGD exam on 07/08/2019. She was found having a circumferential rectal mass. According to the  procedure note, the patient had a fungating circumferential bleeding 4 cm mass in the middle rectum, at distance between 13 cm and 17 cm from the anus. Mass was causing partial obstruction. There were also colon polyps found at the hepatic flexure and also at the rectosigmoid junction 23 cm from the anus. Both were resected and retrieved. EGD examination reported grade A esophagitis at the GE junction and patchy discontinuous erythema and aggravation of the mucosa without bleeding in the stomach body. There is normal mucosa of the duodenum. Pathology evaluation from this procedure reported moderately differentiated adenocarcinoma involving the rectal mass. The rectal sigmoid junction polyp was tubular/tubulovillous adenoma with high grade dysplasia negative for invasive malignancy. The hepatic flexure polyp was also tubular/tubulovillous adenoma negative for high grade dysplasia or malignancy. The biopsy from the esophagus reports squamocolumnar mucosa with inflammatory changes suggestive of mild reflux esophagitis, negative for interstitial metaplasia. Gastric biopsy was benign and duodenal biopsy was also benign. There is no mention of H-pylori.     Patient was subsequently referred to colorectal surgeon Padmaja Ye MD for further evaluation. The patient had CT scan examination for chest, abdomen and pelvis requested by Dr. Ye and were done on 07/13/2019. The study reported no evidence of lymphadenopathy in the abdomen and pelvis. There was wall thickening of the rectosigmoid junction. Normal spleen, pancreas, adrenal glands and kidneys. There was fatty liver infiltration but no focal lymphatic lesions. There was a small 6-7 mm noncalcified nodule in the right upper lobe and a tiny 3 mm subpleural nodule in the right middle lobe. No mediastinal or hilar lymphadenopathy.     Dr. Ye performed staging rectal ultrasound examination. This study reported tumor penetrating through the muscularis propria as T3  disease; there were no lymph nodes identified.    She had a hospitalization in late September 2019 because of newly discovered pulmonary emboli.  She was on prophylactic Lovenox prior to the incident of PE.  Now she is on full dose Xarelto anticoagulation.  Patient reports no further chest pain dyspnea.  She denies bleeding or bruising.  During her hospitalization, she was seen by Dr. Ye, who plans to have surgery 8 to 12 weeks after finishing radiation therapy.  She finished her radiation on 9/19/2019.     I noticed patient also presented to the emergency room on 10/1/2019 complaining of right flank area pain.  I reviewed the images studies and indeed she has a very small 1 to 2 mm obstructing kidney stone in the UV junction.  Patient is still symptomatic with some pains and dysuria.  She denies fever sweating or chills.    Laboratory study on 10/7/2019 reported normal CBC including hemoglobin 13.1, platelets 301,000, WBC 6170 and ANC 4900 lymphocytes 590 monocytes 480.      The nurse reported malfunction of port-a-catheter on 10/7/2019.  Port study on 10/8/2019 showed fibrin sheath around the distal aspect of the Mediport catheter in the SVC. This does not appear to hinder injection, but does prevent aspiration at this time.    Patient underwent surgical resection of the colon on 12/2/2019 with Dr. Ye.  Pathology evaluation reported residual T3 disease, also 1 out of 14 lymph nodes positive for malignancy.  So this patient in either had at least stage IIIb disease (T3 N1 M0?).      Adjuvant chemotherapy FOLFOX 6 will be started on 1/22/2020.  Laboratory study reported iron saturation 10%, free iron 31 TIBC 319 and ferritin 168.  Her hemoglobin was 11.8, WBC 5600, and platelets 347,000.    Patient was here on 02/12/2020 for cycle 2 of her FOLFOX.  This is delayed x1 week secondary to neutropenia.  The patient ultimately received 3 days worth of Neupogen with recovery of her blood counts.  Of note, the patient  struggled with significant nausea without any episodes of vomiting following cycle #1 of chemotherapy resulting in significant weight loss.  She is up 12 pounds over the last week as her appetite has normalized.  We will add Emend to her care plan.    She is having several loose stools per her colostomy and has been hesitant to take Imodium due to prior history of constipation.  I encouraged her to try this with a maximum of 8 tablets/day.  She denies any infectious symptoms including fevers or chills.  No excess bleeding or bruising noted.  She had the expected cold sensitivity related to the Oxaliplatin for about 3 days following treatment.    Labs from 02/12/2020 demonstrated total white blood cell count of 5.14, ANC of 3.06, hemoglobin of 11.2, platelets of 211,000.  She was found to be iron deficient last week and is intolerant to oral iron secondary to GI distress.  For this reason, she initiated IV iron therapy with Injectafer last week.  She had received her second dose 02/12/2020    Patient has normalized hemoglobin 12.2 on 2/26/2020.    She reported on 5/5/2020 she had a recent visit to the emergency department for what was suspected to be an allergic reaction.  She called our on-call service on Saturday with reports of hand and face swelling.  She was instructed to proceed to the emergency department at which time she was assessed and prescribed a Medrol Dosepak.  She had just completed her 5-FU and was unhooked on Friday, 5/3/2020.  Her symptoms have improved.  She does report persistent hyperpigmentation and mild swelling of the palms of the hands but this is much improved.  It was her right hand specifically that was swollen.  Her facial swelling has resolved.  She continues on Cefdinir nd since has 1 day remaining, she was prescribed cefdinir for a UTI requiring hospitalization at the end of April.  Reports no new symptoms.  Her labs are stable.  She is scheduled for treatment again.    Patient states  at this time she is not tolerating her chemotherapy well.     Patient seen in the emergency department on 5/2/2020 for what was suspected to be an allergic reaction.  She called our on-call service on Saturday explaining that she was experiencing hand and face swelling.  She was instructed to proceed to the emergency department at which time she was assessed and prescribed a Medrol Dosepak.  She had just completed her 5-FU and was unhooked on Friday, 5/1/2020.      She reports since her ED visit on 5/2/2020 her symptoms have not improved. Her hands and feet were swollen upon presenting to the ED and she could barely make a fist. She states that she feels so swollen she is not able to stand it. She states that her skin on the hands and feet are peeling extensively as well, besides changing color to more dark.     She also reports significant fatigue after her first week of the 5-FU treatment but she expected this side effect. She also notices significant increase in her neuropathy to the point that she is not able to even walk around in her home without her house slippers due to irritation from her rugs. She denies and associated nausea or vomiting at this time. She also has episodes of epistaxis every day after the chemotherapy cycle #7.  She does report working full-time during the week of chemotherapy.    Laboratory studies, 5/13/2020, show moderate thrombocytopenia platelets 123,000, low normal WBC 4140 including ANC 2720 and normal hemoglobin 13.4.  Because significant hand-foot syndrome, will decrease both 5-FU and oxaliplatin by 50%, and eliminate bolus dose of 5-FU.    On 5/21/2020 patient had a PET scan performed which showed no convincing evidence of residual disease in abdomen or pelvis, no metastatic disease within the chest or neck.     Cycle #8 FOLFOX 6 was given on 5/13/2020, with 50% dose reduction for both 5-FU and oxaliplatin, and also examination of bolus 5-FU.  She states on 5/27/2020 that with  the recent reduction of the chemotherapy she feels significantly better. She has more energy and is able to do her daily routine.      PET scan examination on 5/21/2020 reported no evidence of metastatic disease in chest abdomen pelvis.  There was a stable 6 mm right upper lobe pulmonary nodule.    Laboratory studies on 5/27/2020 showed mild leukocytopenia WBC 3720 but a normal ANC 2250 and lymphocytes 630.  Normal platelets 163,000 and hemoglobin 12.6.  Chemistry lab reported normal renal function, liver function panel and a electrolytes, elevated glucose 164.    Laboratory studies 6/24/2020, showed normal hemoglobin 13.4 but macrocytosis .9.  Normal platelets 210,000 and WBC 5870.  She had normal CMP.     Patient last time was here in late June 2020.  Since that time she had surgical procedure for low anterior colon resection, coloanal anastomosis on 8/3/2020.  She later developed a perirectal abscess, required surgical drainage on 8/14/2020.  She was discharged on 8/27/2020.    Patient reports to me she still has lower abdominal wall vacuum suction in place.  She denies fever sweating chills.  Performance status is ECOG 1.  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  She does have visiting nurse come to the home for wound care.    CT scan for chest abdomen pelvis on 9/9/2020 reported a new 8 mm noncalcified pulmonary nodule in the left lower lobe.  Otherwise stable right upper lobe 6 mm pulmonary nodule, and no evidence of disease recurrence in the abdomen or pelvis.     Laboratory study on 9/16/2020 reported elevated AST 55, ALT 95, alk phosphatase 143 but normal total bilirubin 0.3.  Chemistry lab otherwise unremarkable.  She has completed normal CBC.      Because of the abnormal CT scan examination for chest abdomen pelvis on 9/9/2020 reported a new 8 mm noncalcified pulmonary nodule in the left lower lobe, we obtained a PET scan examination for further evaluation, which was  done on 9/18/2020.  This study reported several pulmonary nodules, some of them are hypermetabolic, newly developed compared to previous PET scan in May 2020.  Certainly does highly suspicious for metastatic disease.  There are also hypermetabolic activity in the abdomen and pelvic area which is hard to differentiate from surgical scar versus metastatic malignancy.    Laboratory study on 10/13/2020 reported normal CBC with Hb 13.9, platelets 302,000 and WBC 5520 including ANC 3830.  Chemistry lab reported normalized AST 20, alk phosphatase 116 improved ALT 35, and maintains normal bilirubin, with normal electrolytes and liver function panel.     Patient was started on first cycle of palliative chemotherapy FOLFIRI on 10/13/2020.    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  Patient had a new port placed 12/18/2020, and had held her Xarelto for 2 days prior to surgery.  She presented to the ER on 12/21/20 with complaints of facial and arm swelling for 3 days.  She also noted increased shortness of breath.  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  Thrombus within the right internal jugular vein cannot be excluded.  Patient was started on IV heparin, and eventually transitioned to weight-based Lovenox, which she now continues.    PET scan examination on 9/24/2021 reported further shrinking of the tiny right upper lobe pulmonary nodule.  Otherwise no new lesions.  No evidence for metastatic disease in other areas.    MEDICATIONS    Current Outpatient Medications:   •  clonazePAM (KlonoPIN) 1 MG tablet, TAKE 1 TABLET BY MOUTH THREE TIMES A DAY AS NEEDED FOR ANXIETY OR SEIZURES, Disp: 90 tablet, Rfl: 0  •  Cyanocobalamin (VITAMIN B-12 PO), Take  by mouth., Disp: , Rfl:   •  dicyclomine (BENTYL) 20 MG tablet, TAKE 1 TABLET BY  MOUTH EVERY 6 HOURS AS NEEDED, Disp: 360 tablet, Rfl: 0  •  diphenoxylate-atropine (LOMOTIL) 2.5-0.025 MG per tablet, TAKE 1 TABLET BY MOUTH 4 (FOUR) TIMES A DAY AS NEEDED FOR DIARRHEA., Disp: 120 tablet, Rfl: 1  •  enoxaparin (LOVENOX) 100 MG/ML solution syringe, INJECT 0.9 ML UNDER THE SKIN INTO THE APPROPRIATE AREA AS DIRECTED EVERY 12 (TWELVE) HOURS., Disp: 60 each, Rfl: 2  •  escitalopram (LEXAPRO) 20 MG tablet, TAKE 1 TABLET BY MOUTH EVERY DAY, Disp: 90 tablet, Rfl: 3  •  famotidine (PEPCID) 20 MG tablet, TAKE 1 TABLET BY MOUTH TWICE A DAY, Disp: 180 tablet, Rfl: 1  •  fluorouracil in dextrose 5 % 250 mL infusion, Infuse  into a venous catheter 1 (One) Time. Takes twice a month., Disp: , Rfl:   •  Loratadine (CLARITIN) 10 MG capsule, Take 10 mg by mouth Every Evening., Disp: , Rfl:   •  metoprolol succinate XL (TOPROL-XL) 25 MG 24 hr tablet, Take 0.5 tablets by mouth Every Night., Disp: 45 tablet, Rfl: 3  •  mineral oil-hydrophilic petrolatum (AQUAPHOR) ointment, Apply 1 application topically to the appropriate area as directed As Needed for Dry Skin., Disp: , Rfl:   •  nystatin (MYCOSTATIN) 903367 UNIT/GM cream, Apply  topically to the appropriate area as directed 2 (Two) Times a Day As Needed (rash)., Disp: 30 g, Rfl: 2  •  ondansetron (ZOFRAN) 8 MG tablet, TAKE 1 TABLET BY MOUTH 3 (THREE) TIMES A DAY AS NEEDED FOR NAUSEA OR VOMITING., Disp: 60 tablet, Rfl: 0  •  prochlorperazine (COMPAZINE) 10 MG tablet, Take 1 tablet by mouth Every 6 (Six) Hours As Needed for Nausea or Vomiting., Disp: 30 tablet, Rfl: 2  •  promethazine (PHENERGAN) 25 MG tablet, Take 1 tablet by mouth Every 6 (Six) Hours As Needed for Nausea or Vomiting., Disp: 60 tablet, Rfl: 2  •  enoxaparin (LOVENOX) 300 MG/3ML solution, Inject  as directed See Admin Instructions., Disp: , Rfl:   •  sulfamethoxazole-trimethoprim (BACTRIM DS,SEPTRA DS) 800-160 MG per tablet, Take 1 tablet by mouth 2 (Two) Times a Day., Disp: 20 tablet, Rfl: 0  No current  "facility-administered medications for this visit.    ALLERGIES:   No Known Allergies    SOCIAL HISTORY:       Social History     Tobacco Use   • Smoking status: Current Every Day Smoker     Packs/day: 1.00     Years: 24.00     Pack years: 24.00     Types: Electronic Cigarette, Cigarettes   • Smokeless tobacco: Never Used   Vaping Use   • Vaping Use: Some days   • Substances: Nicotine   • Devices: Disposable   Substance Use Topics   • Alcohol use: Not Currently     Comment: Caffeine use rare   • Drug use: No         FAMILY HISTORY:   Mother has positive factor V Leiden mutation, although she did not have thrombosis, mother also is coronary disease with stenting, she also is occasional bruising.  Maternal grandmother had DVT, she had multiple surgical procedures.  Patient mother's half-brother had metastatic colon cancer at diagnosis in his 50s.  Maternal great aunt has breast cancer.  Patient will follow his skin cancer in his 60s, exclusive.           Vitals:    12/21/21 0903   BP: 123/84   Pulse: 98   Resp: 20   Temp: 97.1 °F (36.2 °C)   TempSrc: Temporal   SpO2: 95%   Weight: 89.8 kg (198 lb)   Height: 167 cm (65.75\")   PainSc: 0-No pain     Current Status 12/21/2021   ECOG score 0     Physical Exam  Vitals reviewed.   Constitutional:       General: She is not in acute distress.     Appearance: Normal appearance. She is well-developed.   HENT:      Head: Normocephalic and atraumatic.   Eyes:      Pupils: Pupils are equal, round, and reactive to light.   Cardiovascular:      Rate and Rhythm: Regular rhythm. Tachycardia present.      Heart sounds: Normal heart sounds. No murmur heard.      Pulmonary:      Effort: Pulmonary effort is normal. No respiratory distress.      Breath sounds: Normal breath sounds.   Abdominal:      General: Bowel sounds are normal. There is no distension.      Palpations: Abdomen is soft.      Tenderness: There is no abdominal tenderness.      Comments: Ostomy in right abdomen. Scattered " bruises on the abdomen bilaterally from Lovenox injections.   Musculoskeletal:      Cervical back: Normal range of motion.      Right lower leg: No edema.      Left lower leg: No edema.   Skin:     General: Skin is warm and dry.      Findings: Bruising and lesion present. No erythema.      Comments: Left lower abdomen wall had I&D for abscess recently. No active drainage.   Neurological:      Mental Status: She is alert and oriented to person, place, and time. Mental status is at baseline.   Psychiatric:         Mood and Affect: Mood normal.         Thought Content: Thought content normal.         RECENT LABS:  Results from last 7 days   Lab Units 12/21/21  0835   WBC 10*3/mm3 5.36   NEUTROS ABS 10*3/mm3 3.73   HEMOGLOBIN g/dL 15.9   HEMATOCRIT % 49.0*   PLATELETS 10*3/mm3 218       Results from last 7 days   Lab Units 12/21/21  0834   SODIUM mmol/L 139   POTASSIUM mmol/L 3.8   CHLORIDE mmol/L 103   CO2 mmol/L 25.2   BUN mg/dL 9   CREATININE mg/dL 0.80   CALCIUM mg/dL 9.6   ALBUMIN g/dL 4.10   BILIRUBIN mg/dL 0.4   ALK PHOS U/L 88   ALT (SGPT) U/L 55*   AST (SGOT) U/L 34*   GLUCOSE mg/dL 113           IMAGING:  CT Chest With Contrast Diagnostic  Narrative: CT CHEST WITH IV CONTRAST     HISTORY: History of rectal cancer and pulmonary nodule     TECHNIQUE: Radiation dose reduction techniques were utilized, including  automated exposure control and exposure modulation based on body size.  Axial images were obtained through the chest after the administration of  IV contrast. Coronal and sagittal reformatted images obtained.     COMPARISON: PET/CT 09/24/2021, chest CT 12/21/2020     FINDINGS: There is a stable 4 mm right upper lobe nodule on image 34.  Stable left upper lobe micronodule on image 41. Stable 4 mm left lower  lobe nodule on image 83. No suspicious lymphadenopathy. No evidence for  pleural effusion. There is some atelectasis in the right lung base with  elevated right hemidiaphragm again noted. Limited  imaging of the upper  abdomen demonstrates diffuse fatty infiltration of the liver. Previous  cholecystectomy. No acute bony abnormality.     Impression: Stable sub-6 mm nodules bilaterally. Continued follow-up is recommended           Radiation dose reduction techniques were utilized, including automated  exposure control and exposure modulation based on body size.     This report was finalized on 12/15/2021 7:22 AM by Dr. Ronnell Huber M.D.         2. CT OF THE ABDOMEN AND PELVIS WITH CONTRAST 11/29/2021      HISTORY: Left lower quadrant abdominal wall abscess     COMPARISON: 09/24/2021     TECHNIQUE: Axial CT imaging was obtained through the abdomen and pelvis.  IV contrast was administered.     FINDINGS:  There is some nonspecific right basilar consolidation. It is favored to  represent scarring and atelectasis. The stomach and duodenum are  unremarkable, as are the adrenal glands, pancreas, and spleen. 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 Stomach appears patulous  and fluid-filled. Correlation with any evidence of ileus or gastric  outlet obstruction suggested. Adrenal glands are normal. The kidneys  enhance symmetrically. There is no hydronephrosis. The urinary bladder  appears normal. Uterus appears normal. No suspicious adnexal masses are  seen. There is a right lower quadrant ileostomy. There are changes of  prior low anterior resection. Thickening within the presacral region is  again noted. There is no evidence of bowel obstruction. The patient has  multiple areas of soft tissue stranding seen within the anterior  abdominal wall bilaterally. Similar foci present on prior exam. When  compared to prior study, there is probably more skin thickening seen  overlying the left lower quadrant, which may reflect cellulitis.  However, discrete  drainable fluid collection is seen. There is a trace  amount of gas within the abdominal wall bilaterally. No acute osseous  abnormalities are seen.     IMPRESSION:     1. The patient is again noted to have multiple areas of soft tissue  stranding within the intra-abdominal wall bilaterally. These likely  represent injection sites. However, no discrete drainable abscess is  seen. There may be some mild asymmetric skin thickening seen within the  left lower quadrant compared to the contralateral side. This may  represent cellulitis.  2. Somewhat patulous and fluid-filled appearance to the stomach.  Correlation with any evidence of ileus/gastric outlet obstruction is  suggested.           Assessment/Plan      ASSESSMENT:   1.  Metastatic rectal cancer.   · Initial rectal ultrasound examination reported T3N0 disease without lymphadenopathy.   · CT scan of chest, abdomen and pelvis reported no lymphadenopathy in the abdomen, pelvis or chest. She does have small pulmonary nodule 6-7 mm and 2 mm with indeterminate feature. There is no solid evidence of metastatic disease otherwise.   · Based on the CT scan and rectal ultrasound imaging studies, this patient had stage IIA (T3N0M0) disease.   · She had PET scan examination on 8/8/2019 which reported a hypermetabolic right upper lobe pulmonary nodule 6 mm with SUV 5.6.  This is suspicious for metastatic disease however it is too small to be biopsied.  This patient may have stage IV disease.   · She initiated concurrent radiation with continuous 5-FU on 8/12/2019.  · Patient finished concurrent chemoradiation therapy.  · Patient underwent surgical resection of the rectal tumor and diverting loop ileostomy on 12/2/2019 with Dr. Ye.  Pathology evaluation reported residual T3 disease, with 1 out of 14 lymph nodes positive for malignancy.  Certainly this patient has at least stage IIIb rectal cancer (T3 N1 M0?)  · On 1/22/2020 adjuvant chemotherapy FOLFOX 6 regimen initiated.    · On 2/5/2022 cycle #2 was delayed secondary to neutropenia.  She was given 3 days of Granix.   · Emend added with cycle 3.  With improved nausea control  · Continuing home Granix x3 days following 5-FU disconnect  · 3/11/2020 due for cycle 4, however, she is experiencing progressive abdominal pain and occasional fevers.   · CT scan performed on 3/13/2020 reveals no evidence of progressive or recurrent disease.  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  · Patient hospitalized 4/24-4/26/20 after cycle 5 of chemotherapy with acute UTI.  CT abdomen/pelvis noting fluid collection in the presacral region having diminished in size compared to CT dated 3/13/2020.  Patient was evaluated by Dr. Ye while in the hospital with further plans to evaluate possible colovaginal fistula following completion of chemotherapy.  Patient did respond to IV antibiotics and discharged home on oral cefdinir.  · 4/29/2020 cycle 6 of FOLFOX.  Urinary symptoms have resolved   · Patient seen in the [facility] ED on 5/2/2020 for what was suspected to be an allergic reaction.  She called our service on Saturday explaining that she was experiencing hand and face swelling.  She was instructed to proceed to the emergency department at which time she was assessed and prescribed a Medrol Dosepak.  She had just completed her 5-FU and was unhooked on Friday, 5/1/2020.  Her symptoms have resolved in the office on assessment, 5/5/2020.  · The patient had grade 3 hand-foot syndrome based on symptomology.  Patient had cycle #8 of 5-FU on 5/13/2020. Due to her symptoms and poor tolerance to the 5-FU, her treatment dose will be reduced 50% for oxaliplatin and infusional 5-FU.  Bolus 5-FU will be discontinued..  · On 5/21/2020 patient had a PET scan and it showed no evidence of of metastatic disease in the neck, chest, abdomen or pelvis.  There was fluid accumulation/abscess.   · On 5/27/2020 discussed with the patient that we can discontinue chemotherapy  at this time.  She will follow-up with Dr. Ye to discuss a possibility to reverse the ileostomy.  We likely will obtain anther PET scan in 3 to 4 months for reassessment.    · Patient was seen by Dr. Ye on 6/19/2019 for evaluation and to discuss possible take down of her ileostomy.  She is scheduled to have a gastrografin enema on 7/2/2020 to evaluate for a fistula, and then a colonoscopy on 7/15/2020, both done by Dr. Ye.  She states that based on the above imaging and procedure findings, she may have a more extensive surgery done or just have her ileostomy reversed, which would likely be done in August 2020.  This will all be coordinated under the care of Dr. Ye.     · CT scan from 09/09/2020 and also compared to her previous PET scan examination from 05/21/2020 and also the original PET scan from 08/09/2019.  The original PET scan there is a small right upper lobe pulmonary nodule 6 mm with SUV 5.6. So in the 05/2020 PET scan the nodule is still there but activity seems much less and this most recent CT scan examination also documented the preexisting small pulmonary nodule however there is a new left lower lobe pulmonary nodule 8 mm in size and I shared with the patient today this nodule is suspicious for metastatic disease. Laboratory studies reported minimal CEA level 1.32 on 09/09/2020. Liver function panel was only marginally abnormal with the ALT 45, the remaining is normal. However laboratory study today showed worsening AST 55, ALT 95 and alkaline phosphatase 143 but is still normal, total bilirubin 0.3.   · Recommended to have repeat PET scan examination for assessment because the left lower lobe pulmonary nodule is too small to be biopsied, and if the PET scan reports hypermetabolic lesion, I recommended to have stereotactic radiation therapy. Explained to patient that she is not a really good candidate to have thoracotomy for resection because she still has unhealed wound in the abdomen.  Stereotactic radiation therapy will likely be a more feasible choice.   · PET scan examination obtained on 9/18/2020 showed metastatic disease.  It reported a few hypermetabolic pulmonary nodules, and some new small pulmonary nodules, all of them highly suspicious for metastatic disease.  She also has hypermetabolic activity in the abdomen and pelvic area which could be related to scar tissue from her recent surgery versus metastatic disease.  Nevertheless overall picture fits with metastatic cancer.  · Discussed with the patient on 9/20/2020, we reviewed the PET scan images together, and I recommended to have systematic chemotherapy, I do not think stereotactic reading therapy to the hypermetabolic lesions in the lungs warranted at this time because even those will be treated with reading therapy, she will still need systematic chemotherapy.  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  I did discuss with the patient using anti-EGFR monoclonal antibody versus anti-VEGF monoclonal antibody.     · Palliative chemotherapy cycle#1 FOLFIRI was started on 10/13/2020.  No bolus 5-FU given considering previous poor tolerance.    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  Microsatellite stable, mutation burden 5/Mb.   · Discussed with the patient 10/27/2020, because of the K-s[place]ia mutation, she is not a candidate for anti-EGFR monoclonal antibody such as Erbitux or vectibix.  She could be a candidate for anti-VEGF monoclonal antibody, however because of active wound, she is not a candidate right now at this moment.  · Patient seen in the ED for acute right neck pain on 11/16/2020.  CT angiogram as well as CT of the cervical spine performed with no acute findings but notably one of her pulmonary nodules, left upper lobe, somewhat decreased in size.  Patient given prednisone pack.  Further details below.  · Cycle #6 chemotherapy will be resumed on 1/5/2021.   Started back on original irinotecan.  · PET scan examination obtained on 1/12/2021 after cycle #6 chemotherapy reported improved pulmonary nodule hypermetabolic activity.  Confirmed these are metastatic lesions.  · Patient is evaluated 1/19/2020, will continue cycle #7 FOLFIRI chemotherapy.  However Avastin will be on hold see next section.   · Patient was reevaluated on 2/2/2021, will continue chemotherapy cycle #8 FOLFIRI.  Avastin / biosimilar will be added.    · She talked to [facility] specialist in mid February 2021, and had recommended palliative chemotherapy without surgical intervention of metastatic lung lesions.   · On 3/2/2021, patient will proceed with cycle #10 FOLFIRI plus bevacizumab.  After 12 cycles, plan to start maintenance treatment.  · 3/16/2021 cycle 11.  Plus bevacizumab.  · 3/30/2021 cycle 12 FOLFIRI plus bevacizumab.  Having issues with worsening nausea despite premeds with dexamethasone, Aloxi, Emend, and Zofran at home.  Patient is requesting a dose of Phenergan, which is helped her in the past.  We will give this to her with treatment today.  · PET scan examination on 4/6/2021 reported no evidence of hypermetabolic metastatic lesion.  · Discussed with the patient on 4/13/2021, we reviewed the PET scan results.  We recommended to switch to maintenance chemotherapy without irinotecan.  We will continue 5-FU and Avastin every 2 weeks.  We discussed possibility of switching to oral Xeloda treatment.  Discussed with the patient for side effects more so with hand-foot syndrome.  Patient is agreeable.   · Xeloda 2000 mg twice daily 7 days on, 7 days off along with Avastin initiated 4/27/2021.  · After only 5 doses of Xeloda significant hand-foot syndrome, Xeloda held. Patient requesting to be transitioned back to infusional 5-FU, as she felt that she tolerated infusional 5-FU without any problem.  The patient will receive 5-FU leucovorin and Avastin every 2 weeks.   Xeloda discontinued.  · 5/25/2021 continued cycle #4 maintenance 5-FU, leucovorin, Avastin tolerating this much better so far, we will continue this. Recommended to have 12 cycles of maintenance chemotherapy, then obtain PET scan for reevaluation.  We could discuss further treatment plan at that time.  · 9/14/2021 patient due for cycle 12 of additional maintenance 5-FU/leucovorin plus Avastin.  She continues to tolerate treatment well overall.  She is anxious in the office today with her Mediport not getting blood at first and also with upcoming scans being heavy on her mind.  She was instructed to take one of her Klonopin pills while here to help calm her mind.  Heart rate did improve from 140s down to 112.  Proceed with treatment today as scheduled.  · PET scan examination on 9/24/2021 reported further shrinking of the right upper lobe tiny pulmonary nodule.  No other new lesions.  · Discussed with patient on 9/24/2021 about further shrinking of the right upper lobe pulmonary nodule and no evidence for other new lesions. We recommended to have chemo break for 3 months with repeated CT scan for reevaluation.  · Recent CT scan for chest 12/14/2021 and abdomen CT from 11/29/2021 reported no evidence for active disease. The right upper lobe pulmonary nodule, left lower lobe pulmonary nodule minimal about 4 mm and stable. I discussed with patient today on 12/21/2021, recommended to give her another 3 months chemotherapy holiday and obtain CT scan afterwards for reevaluation. After discussion, patient is agreeable. 1      2.   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  Low-dose Lovenox discontinued and initiated Xarelto 20 mg daily.  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  · Patient held Xarelto for 2 days prior to MediPort removal from the left chest wall and placement of new port in the right chest wall on 12/18/2020.  She resumed Xarelto that evening.  · Progressive swelling in the neck, face, upper extremities prompting hospitalization with CT scan showing thrombosis in the right subclavian vein and SVC.  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  Although she had been off of Xarelto for a few days, clearly Xarelto was not prohibiting progression of her thrombosis after she resumed treatment and there was some evidence to suggest thrombosis had been present at least in the innominate vein on prior scan in September but now appears chronic.  Current acute thrombosis involving SVC and right subclavian.  · Patient was admitted and placed on heparin drip  · Patient was evaluated by vascular surgery and intervention was not recommended for thrombolysis/thrombectomy.  · On 12/22/2020, the patient developed worsening shortness of breath, increasing upper extremity edema consistent with worsening SVC syndrome.  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  · Symptoms improved and patient was discharged 12/24/2020 on Lovenox 100 mg every 12 hours.    · Returns 12/29/2020 for evaluation continuing Lovenox, overall tolerating it well.  No bleeding issues.  · Improved edema 1/5/2021.  Will continue Lovenox weight-based every 12 hours.  · On 1/19/2021 and 2/2/2021, patient reports improved facial swelling.  She however does have being  bruise on her abdomen wall where she does Lovenox injection.  However she does not have a spontaneous epistaxis, gum bleeding or other bleeding.   · On 2/2/2021, we discussed that side effects of Avastin/biosimilar related to thrombosis.  Since this patient already has been on Lovenox, I think the benefits from treatment for her cancer will outweigh that risk of thrombosis, will proceed ahead with Avastin.  I cautioned patient to watch out for signs of worsening thrombosis patient voiced understanding.   · On 3/16/2021, no evidence of worsening DVT, continue Lovenox.  · 5/11/2021 Lovenox dosing will be adjusted due to patient's weight loss.  We discussed she needs 1 mg/kg.  Her syringes are 100 mg syringes she will do 0.9 mL subcu every 12 hours.  · 8/3/2021 Patient continues to have bruising on abdomen from lovenox injections.  Will need to monitor weight and adjust dosing in future if further weight loss.   · Stable condition on 9/28/2021.  Continue Lovenox anticoagulation.    · Patient reports no chest pain no dyspnea no leg edema. However she had bruising and also most recently abnormal small abscess for which she actually went to ER on 11/29/2021, had I&D. The wound is healing. No further drainage. Denies fever sweating or chills. After discussion, she will continue Lovenox injection for now.     3.  This patient also has strong family history for malignancy the patient had appointment with genetic counseling on September 3, 2019.  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    4.  Mild anemia, improved since her surgery.    · She also has a history of iron deficiency.  Iron studies reveal iron saturation of 10%.  She was started on oral iron but was unable to tolerate due to GI side effects.   · Status post Injectafer 2/5/2020 and 2/12/2020   · Improved hemoglobin 13.5 on 1/5/2021.  · 3/16/2020 when hemoglobin now up to 16.2, hematocrit 47.6.  Patient admits that she has started smoking again, and I have  encouraged her to quit.  She denies any snoring or sleep apnea diagnosis.  Patient is not taking oral iron.  We will closely monitor.  · 3/30/2020 hemoglobin 15.7, HCT 47.5.  Patient states that she has cut back significantly on her smoking, although not quit yet.  I have encouraged her to continue working on this.  We will continue to closely monitor.  · Hemoglobin 14.6 on 6/8/2021 at the meantime he has worsening macrocytosis .6.    · Laboratory studies 6/22/2021 reported excellent folate > 20 ng/mL, however low normal B12 level 357 pg/mL.    · On 7/6/2021, normal hemoglobin 15.8, .9 and HCT 47.2%.  Patient was asked to start oral vitamin B12 at 1000 mcg daily.   · 9/14/2021 hemoglobin 17.0, hematocrit 52.1.  Monitor.  · On 9/28/2021 hemoglobin 16.1 hematocrit 48.5%, continue to monitor.   · Lab study on 12/14/2021 reported excellent ferritin 296 and iron saturation 25% with free iron 104 TIBC 424. Hemoglobin was 15.2 with .2. Continue to monitor for now.    5.  Peripheral neuropathy secondary to chemotherapy.   · This is mild involving bilateral hands and feet as reported on 9/16/2020.   · Will avoid chemotherapy with oxaliplatin.  · Stable mild neuropathy as of 11/10/2020  · Patient reports worsening peripheral neuropathy and cycle #5 chemotherapy on 12/8/2020 with and without irinotecan.   · On 1/5/2021, patient reports improvement of peripheral neuropathy, will add irinotecan back to chemotherapy.  Continue to monitor and adjust medication.  · On 3/2/2021, patient reports no worsening of peripheral neuropathy after restarting irinotecan.   · This remains stable.     6.  History of hand-foot syndrome grade 3.    · It become so significant after cycle #7 FOLFOX treatment.  Discussed with patient on 5/13/2020, will discontinue the bolus 5-FU dose, and also decrease 50% of the infusion dose through the pump.   · We also use Medrol Dosepak for possible recurrent symptomology.  She responded  this well.   · Her hand-foot syndrome improved.  · Under current treatment with FOLFIRI, patient not receiving 5-FU bolus.  No recurrent issues.   · Patient will be switched to maintenance chemotherapy using Xeloda in late April 2021.  · Following 5 doses of Xeloda, significant hand-foot syndrome with pain in the feet, significant erythema.  Xeloda held.  Will be further discussed with Dr. Nguyen to determine if the patient will resume 5-FU versus a dose reduction to Xeloda.  · Aggressive emollient moisturizer use twice daily for the past week and patient reports improvement in the dryness and erythema.  Xeloda will now be discontinued and patient will switch back to 5-FU.  · As of 5/25/2021 dryness and erythema/hyperpigmentation continues to improve.  Xeloda has been discontinued.  · 6/8/2021, patient reports much improved hand-foot syndrome, with remnant pigmentation on palms.  · On 7/6/2021, patient reports and had a some flareup of hand-foot syndrome, however improved after aggressive moisturizing cream and the urea cream.  Overall much tolerated infusional 5-FU compared to Xeloda.    · 7/20/2021 continues to have mild hyperpigmentation of her hands and feet bilaterally, she continues aggressive moisturization with utter balm with urea.  She also reports some soreness of her tailbone, and we discussed that this is likely due to loss of weight and muscle mass.  I advised her to go ahead and apply moisturizer to this area as there is one tiny fissure.  Also recommended using a waffle pad or doughnut to sit on.  · 8/3/2021 patient reports her hand foot symptoms are stable and without worsening.  Continue to monitor.  · Symptoms stable, typically flaring about 24 hours after she is unhooked from her 5-FU infusional ball and then settling down with some mild peeling.   · Since of chemotherapy no specific complaints.     7.  Hyperlacrimation and mild scleral irritation related to 5-FU.  She has been taking steroid  eyedrops.  This has improved her hyperlacrimation.  · Not currently an issue.     8.  Abnormal liver function panel.  · Overall LFTs have improved which is mild ongoing elevation of AST and ALT.  Continue to monitor.    · Slightly worse AST 43 ALT 84 on 9/28/2021.  Continue to monitor.  · Slightly elevated AST 34 ALT 55 on 12/21/2021. Continue to monitor.      9.  Depression.  Patient seen by JULIET Davenport on 11/9/2020.  She was started on mirtazapine.  Lexapro was discontinued.  This has definitely improved her mood with the patient feeling overall much better.  She is sleeping better.  Appetite is improved with her actually eating more than she wants to.    · Condition is stable.  She plans to continue follow-up with supportive oncology.    10.  Intermittent upper abdominal discomfort.  This improves with eating.  After further discussion with the patient she also notes 3 nights of increased reflux when laying down.  We discussed her symptoms could be related to increase stomach acid or potential ulcer.  She is currently taking Pepcid 20 mg daily.  I instructed her to add Prilosec 20 mg daily.   · On 3/16/2021 patient reports some worsening reflux symptoms last week.  She has increased Pepcid to 20 mg twice daily, which did resolve her symptoms.  We discussed she could add Prilosec 20 mg daily as well.   · No complaint today.  Continue to monitor.    11.  Proteinuria: 3/30/2020: Patient is 2+ protein in her urine.  We will continue with Avastin and closely monitor.  No need for 24-hour urine at this time.  · Persistent 2+ proteinuria on 4/13/2021.  continue to monitor.  · 5/25/2021 protein urine only 1+.    · 6/22/2021 persistent 1+ proteinuria.  Continue to monitor.  · 7/6/2021, trace protein.  Continue Avastin treatment and monitoring.  · 8/3/2021 1+ protein, stable from last cycle.   · On 8/17/2021, urine protein 2+.  She also had a 1+ leukocytes.  Patient is not symptomatic such as fever, dysuria or  gross hematuria, however urine culture obtained, with >100,000 E. Faecalis. Patient treated with Levaquin.     12.  Tachycardia:   · Patient was seen by Dr. Bustos cardiologist on 4/6/2021.   · Follow up visit 7/22/2021 with plans to repeat evaluation in 3 months  · As noted above patient more tachycardic on 9/14/2021 in the setting of anxiety.  Improved with Klonopin.    13.  Hypertension.  · /91 at visit 8/17/2021.  She was given clonidine for treatment that day and also prescribed lisinopril 10 mg daily.    · Patient unable to tolerate lisinopril, noting that her blood pressure bottomed out with systolic of barely 80 and feeling very fatigued and wiped out.  She has been checking her blood pressure off the lisinopril with systolics in the 120s to 130s at home and diastolic in the 80s to 90s.   · Blood pressure remained stable with systolic in the 120s-130s.    · Today on 12/21/2021 /84 with a pulse 98. She will remain off antihypertensives for now.        PLAN:  1. Cancel chemotherapy today.   2. I recommended to continue chemotherapy holiday for another 3 months.     3. Continue port flush every 6 weeks.    4. Continue Lovenox 1 mg/kg twice daily.  5. Continue oral B12 at 1000 mcg daily.  6. Arrange CT scan for chest abdomen pelvis with IV and oral contrast in 3 months for reassessment.  7. Obtain laboratory studies CBC CMP and CEA level.   8. Patient return for reevaluation in 3 months, to discuss results of CT scan examination and further treatment plan.  Possible resumption of chemotherapy with 5-FU leucovorin and Avastin.      I personally reviewed the CT scan images from 12/14/2021, 11/29/2021, and I compared to PET scan images from 9/24/2021 and the PET scan from 1/12/2021. I shared images with the patient today.  We discussed management plan and long-term outlook.  Questions were answered to patient satisfaction.    More than 40 min were used for patient care, over half of that time were  for counseling.      Dong Ngyuen MD PhD  12/21/21      CC:  Deepika Vela III NPMD Perla Delgado MD

## 2021-12-21 NOTE — NURSING NOTE
No treatment today. Activase removed w/ good blood return noted. Port deaccessed per protocol. Pt understands to call the office w/ concerns prior to next visit.

## 2021-12-30 DIAGNOSIS — C20 ADENOCARCINOMA OF RECTUM (HCC): ICD-10-CM

## 2021-12-30 DIAGNOSIS — F41.9 ANXIETY: ICD-10-CM

## 2021-12-30 RX ORDER — FAMOTIDINE 20 MG/1
TABLET, FILM COATED ORAL
Qty: 180 TABLET | Refills: 1 | Status: SHIPPED | OUTPATIENT
Start: 2021-12-30 | End: 2023-04-04

## 2021-12-30 RX ORDER — ONDANSETRON HYDROCHLORIDE 8 MG/1
8 TABLET, FILM COATED ORAL 3 TIMES DAILY PRN
Qty: 60 TABLET | Refills: 0 | Status: SHIPPED | OUTPATIENT
Start: 2021-12-30 | End: 2022-03-16

## 2021-12-30 RX ORDER — CLONAZEPAM 1 MG/1
TABLET ORAL
Qty: 90 TABLET | Refills: 0 | Status: SHIPPED | OUTPATIENT
Start: 2021-12-30 | End: 2022-03-17

## 2022-01-11 ENCOUNTER — OFFICE VISIT (OUTPATIENT)
Dept: INTERNAL MEDICINE | Facility: CLINIC | Age: 42
End: 2022-01-11

## 2022-01-11 VITALS
HEART RATE: 100 BPM | SYSTOLIC BLOOD PRESSURE: 112 MMHG | HEIGHT: 65 IN | BODY MASS INDEX: 33.05 KG/M2 | OXYGEN SATURATION: 99 % | WEIGHT: 198.4 LBS | TEMPERATURE: 98.2 F | DIASTOLIC BLOOD PRESSURE: 70 MMHG | RESPIRATION RATE: 16 BRPM

## 2022-01-11 DIAGNOSIS — L02.211 ABDOMINAL WALL ABSCESS: Primary | ICD-10-CM

## 2022-01-11 PROCEDURE — 99213 OFFICE O/P EST LOW 20 MIN: CPT | Performed by: FAMILY MEDICINE

## 2022-01-11 RX ORDER — SULFAMETHOXAZOLE AND TRIMETHOPRIM 800; 160 MG/1; MG/1
1 TABLET ORAL 2 TIMES DAILY
Qty: 14 TABLET | Refills: 0 | Status: SHIPPED | OUTPATIENT
Start: 2022-01-11 | End: 2022-01-21

## 2022-01-11 NOTE — PROGRESS NOTES
"Chief Complaint  Abscess    Subjective    History of Present Illness {CC  Problem List  Visit  Diagnosis   Encounters  Notes  Medications  Labs  Result Review Imaging  Media :23}     Carole Weaver presents to [facility] for   History of Present Illness   42 yo female present for an acute visit. She normally see Red CHUNG.  Pt states she has a long history she undergoing treatment colon cancer. She states treated in November for abdominal abscess. It has been healing well since that time, until this morning.  She notice some pain when she woke up. She took a needle open the lesion on abdomen. She had some drainage from the area throughout the day.       Objective     Vital Signs:   /70 (BP Location: Right arm, Patient Position: Sitting, Cuff Size: Adult)   Pulse 100   Temp 98.2 °F (36.8 °C) (Temporal)   Resp 16   Ht 165.1 cm (65\")   Wt 90 kg (198 lb 6.4 oz)   SpO2 99%   BMI 33.02 kg/m²   Physical Exam  Constitutional:       General: She is not in acute distress.  Abdominal:      Palpations: Abdomen is soft.   Skin:     Findings: Lesion present.      Comments: 2x2 cm lesion on LL quadrant of abdomen, open, draining yellow purulent discharge. Non tender. erythematous   Neurological:      Mental Status: She is alert.        Result Review  Data Reviewed:{ Labs  Result Review  Imaging  Med Tab  Media :23}   The following data was reviewed by: Keily Wilkerson MD on 01/11/2022  Lab Results - Last 18 Months   Lab Units 12/21/21  0835 12/21/21  0834 12/14/21  0914 12/14/21  0841 11/28/21  2336 11/28/21  2236 11/28/21  2236 09/28/21  0831 04/27/21  1020 04/13/21  1141 12/21/20  1123 12/21/20  1038 11/24/20  0746 11/16/20  0903 09/09/20  0812 08/12/20  1758   BUN mg/dL  --  9  --  13  --   --  11  --    < > 9   < >  --    < > 9   < > 5*   CREATININE mg/dL  --  0.80 0.70 0.75  --    < > 0.55*  --    < > 0.85   < >  --    < > 0.70   < > 0.59   EGFR IF NONAFRICN " AM mL/min/1.73  --  79  --  85  --   --  121  --    < > 74   < >  --    < > 92   < > 112   SODIUM mmol/L  --  139  --  137  --   --  144  --    < > 136   < >  --    < > 135*   < > 132*   POTASSIUM mmol/L  --  3.8  --  4.6  --   --  3.8  --    < > 4.3   < >  --    < > 4.3   < > 3.5   CHLORIDE mmol/L  --  103  --  106  --   --  108*  --    < > 102   < >  --    < > 103   < > 97*   CALCIUM mg/dL  --  9.6  --  9.0  --   --  9.0  --    < > 9.7   < >  --    < > 8.8   < > 8.4*   ALBUMIN g/dL  --  4.10  --  4.00  --   --  3.60  --    < > 4.00   < >  --    < > 3.70   < > 2.90*   BILIRUBIN mg/dL  --  0.4  --  0.2  --   --  0.2  --    < > 0.3   < >  --    < > <0.2   < > 0.4   ALK PHOS U/L  --  88  --  87  --   --  75  --    < > 121*   < >  --    < > 115   < > 96   AST (SGOT) U/L  --  34*  --  29  --   --  17  --    < > 24   < >  --    < > 14   < > 17   ALT (SGPT) U/L  --  55*  --  44*  --   --  33  --    < > 56*   < >  --    < > 30   < > 19   TRIGLYCERIDES mg/dL  --   --   --   --   --   --   --   --   --  189*  --   --   --   --   --   --    HDL CHOL mg/dL  --   --   --   --   --   --   --   --   --  40  --   --   --   --   --   --    VLDL CHOL mg/dL  --   --   --   --   --   --   --   --   --  32  --   --   --   --   --   --    LDL CHOL mg/dL  --   --   --   --   --   --   --   --   --  92  --   --   --   --   --   --    LDL/HDL RATIO   --   --   --   --   --   --   --   --   --  2.16  --   --   --   --   --   --    WBC 10*3/mm3 5.36  --   --  6.31 6.77  --   --    < >   < > 4.25   < >  --    < > 2.98*   < > 12.38*   RBC 10*6/mm3 4.85  --   --  4.58 4.27  --   --    < >   < > 4.74   < >  --    < > 4.24   < > 3.32*   HEMATOCRIT % 49.0*  --   --  45.9 42.6  --   --    < >   < > 46.9*   < >  --    < > 38.0   < > 32.5*   MCV fL 101.0*  --   --  100.2* 99.8*  --   --    < >   < > 98.9*   < >  --    < > 89.6   < > 97.9*   MCH pg 32.8  --   --  33.2* 34.9*  --   --    < >   < > 33.1*   < >  --    < > 30.2   < > 32.2   INR   --   --    --   --   --   --   --   --   --   --   --  1.40*  --  1.51*  --  1.38*    < > = values in this interval not displayed.               Assessment and Plan {CC Problem List  Visit Diagnosis  ROS  Review (Popup)  Health Maintenance  Quality  BestPractice  Medications  SmartSets  SnapShot Encounters  Media :23}   Problem List Items Addressed This Visit     None      Visit Diagnoses     Abdominal wall abscess    -  Primary      Spent time, draining purulent drainage from open lesion L side of abdomen.    Advised to avoid opening wound on her own again.   Advise hot compreses, continue apply pressure to help drain lesion. [facility]    Follow up with pcp       Follow Up   Return if symptoms worsen or fail to improve.  Patient was given instructions and counseling regarding her condition or for health maintenance advice. Please see specific information pulled into the AVS if appropriate.    EpicAct:_BJ_AMB_ORDERS,RunParams:STARTUPTYPE=FOLLOW    MR_[email or web address]

## 2022-01-21 ENCOUNTER — OFFICE VISIT (OUTPATIENT)
Dept: INTERNAL MEDICINE | Facility: CLINIC | Age: 42
End: 2022-01-21

## 2022-01-21 VITALS
TEMPERATURE: 98 F | HEART RATE: 102 BPM | RESPIRATION RATE: 16 BRPM | OXYGEN SATURATION: 99 % | WEIGHT: 203.2 LBS | SYSTOLIC BLOOD PRESSURE: 112 MMHG | DIASTOLIC BLOOD PRESSURE: 62 MMHG | BODY MASS INDEX: 33.85 KG/M2 | HEIGHT: 65 IN

## 2022-01-21 DIAGNOSIS — C18.9 MALIGNANT NEOPLASM OF COLON, UNSPECIFIED PART OF COLON: Chronic | ICD-10-CM

## 2022-01-21 DIAGNOSIS — K65.1 ABDOMINOPELVIC ABSCESS: ICD-10-CM

## 2022-01-21 DIAGNOSIS — F33.9 MONOPOLAR DEPRESSION: Primary | Chronic | ICD-10-CM

## 2022-01-21 PROBLEM — N30.90 CYSTITIS: Status: RESOLVED | Noted: 2020-10-27 | Resolved: 2022-01-21

## 2022-01-21 PROBLEM — R39.15 URINARY URGENCY: Status: RESOLVED | Noted: 2020-03-01 | Resolved: 2022-01-21

## 2022-01-21 PROBLEM — R30.0 DYSURIA: Status: RESOLVED | Noted: 2021-04-13 | Resolved: 2022-01-21

## 2022-01-21 PROBLEM — G89.18 PAIN ASSOCIATED WITH SURGICAL PROCEDURE: Status: RESOLVED | Noted: 2020-01-29 | Resolved: 2022-01-21

## 2022-01-21 PROBLEM — N30.90 CYSTITIS: Status: RESOLVED | Noted: 2020-04-24 | Resolved: 2022-01-21

## 2022-01-21 PROBLEM — N39.0 UTI (URINARY TRACT INFECTION): Status: RESOLVED | Noted: 2019-09-20 | Resolved: 2022-01-21

## 2022-01-21 PROCEDURE — 99213 OFFICE O/P EST LOW 20 MIN: CPT | Performed by: NURSE PRACTITIONER

## 2022-01-21 NOTE — PROGRESS NOTES
Chief Complaint  Hypertension and Depression     Subjective:      History of Present Illness {CC  Problem List  Visit  Diagnosis   Encounters  Notes  Medications  Labs  Result Review Imaging  Media :23}     Carole Weaver presents to [facility] for follow up:     1) Depression/ anxiety: chronic.  Related to health condition: adenocarcinoma of rectum.   Pulmonary nodule.     She is currently on drug holiday.  Next scan in March.   States doing well.     About to take some time off work.    Going to [place]     2) LV abd abscess - seen by Dr Wilkerson.  States resolved.  No fever, chills. It has closed and no drainage.         I have reviewed patient's medical history, any new submitted information provided by patient or medical assistant and updated medical record.      Objective:      Physical Exam  Vitals reviewed.   Constitutional:       Appearance: Normal appearance. She is well-developed.   HENT:      Head:      Comments: Wearing mask due to COVID   Neck:      Thyroid: No thyromegaly.   Cardiovascular:      Rate and Rhythm: Regular rhythm. Tachycardia present.      Pulses: Normal pulses.      Heart sounds: Normal heart sounds.   Pulmonary:      Effort: Pulmonary effort is normal.      Breath sounds: Normal breath sounds.      Comments: E/U   Abdominal:       Skin:     General: Skin is warm and dry.      Capillary Refill: Capillary refill takes 2 to 3 seconds.   Neurological:      Mental Status: She is alert and oriented to person, place, and time.   Psychiatric:         Mood and Affect: Mood normal.         Behavior: Behavior normal. Behavior is cooperative.         Thought Content: Thought content normal.         Judgment: Judgment normal.        Result Review  Data Reviewed:{ Labs  Result Review  Imaging  Med Tab  Media :23}                Vital Signs:   /62 (BP Location: Left arm, Patient Position: Sitting, Cuff Size: Adult)   Pulse 102   Temp 98 °F  "(36.7 °C) (Temporal)   Resp 16   Ht 165.1 cm (65\")   Wt 92.2 kg (203 lb 3.2 oz)   SpO2 99%   BMI 33.81 kg/m²         Requested Prescriptions      No prescriptions requested or ordered in this encounter       Routine medications provided by this office will also be refilled via pharmacy request.       Current Outpatient Medications:   •  clonazePAM (KlonoPIN) 1 MG tablet, TAKE 1 TABLET BY MOUTH THREE TIMES A DAY AS NEEDED FOR ANXIETY OR SEIZURES, Disp: 90 tablet, Rfl: 0  •  Cyanocobalamin (VITAMIN B-12 PO), Take  by mouth., Disp: , Rfl:   •  dicyclomine (BENTYL) 20 MG tablet, TAKE 1 TABLET BY MOUTH EVERY 6 HOURS AS NEEDED, Disp: 360 tablet, Rfl: 0  •  diphenoxylate-atropine (LOMOTIL) 2.5-0.025 MG per tablet, TAKE 1 TABLET BY MOUTH 4 (FOUR) TIMES A DAY AS NEEDED FOR DIARRHEA., Disp: 120 tablet, Rfl: 1  •  enoxaparin (LOVENOX) 100 MG/ML solution syringe, INJECT 0.9 ML UNDER THE SKIN INTO THE APPROPRIATE AREA AS DIRECTED EVERY 12 (TWELVE) HOURS., Disp: 60 each, Rfl: 2  •  enoxaparin (LOVENOX) 300 MG/3ML solution, Inject  as directed See Admin Instructions., Disp: , Rfl:   •  escitalopram (LEXAPRO) 20 MG tablet, TAKE 1 TABLET BY MOUTH EVERY DAY, Disp: 90 tablet, Rfl: 3  •  famotidine (PEPCID) 20 MG tablet, TAKE 1 TABLET BY MOUTH TWICE A DAY, Disp: 180 tablet, Rfl: 1  •  fluorouracil in dextrose 5 % 250 mL infusion, Infuse  into a venous catheter 1 (One) Time. Takes twice a month., Disp: , Rfl:   •  Loratadine (CLARITIN) 10 MG capsule, Take 10 mg by mouth Every Evening., Disp: , Rfl:   •  metoprolol succinate XL (TOPROL-XL) 25 MG 24 hr tablet, Take 0.5 tablets by mouth Every Night., Disp: 45 tablet, Rfl: 3  •  mineral oil-hydrophilic petrolatum (AQUAPHOR) ointment, Apply 1 application topically to the appropriate area as directed As Needed for Dry Skin., Disp: , Rfl:   •  nystatin (MYCOSTATIN) 289183 UNIT/GM cream, Apply  topically to the appropriate area as directed 2 (Two) Times a Day As Needed (rash)., Disp: 30 g, " Rfl: 2  •  ondansetron (ZOFRAN) 8 MG tablet, TAKE 1 TABLET BY MOUTH 3 (THREE) TIMES A DAY AS NEEDED FOR NAUSEA OR VOMITING., Disp: 60 tablet, Rfl: 0  •  prochlorperazine (COMPAZINE) 10 MG tablet, Take 1 tablet by mouth Every 6 (Six) Hours As Needed for Nausea or Vomiting., Disp: 30 tablet, Rfl: 2  •  promethazine (PHENERGAN) 25 MG tablet, Take 1 tablet by mouth Every 6 (Six) Hours As Needed for Nausea or Vomiting., Disp: 60 tablet, Rfl: 2     Assessment and Plan:      Assessment and Plan {CC Problem List  Visit Diagnosis  ROS  Review (Popup)  Health Maintenance  Quality  BestPractice  Medications  SmartSets  SnapShot Encounters  Media :23}     Problem List Items Addressed This Visit        Gastrointestinal Abdominal     Abdominopelvic abscess (HCC)       Hematology and Neoplasia    Malignant neoplasm of colon (HCC) (Chronic)       Mental Health    Monopolar depression (HCC) - Primary (Chronic)    Current Assessment & Plan     Patient's depression is recurrent and is mild without psychosis. Their depression is currently in full remission and the condition is improving with treatment. This will be reassessed at the next regular appointment. F/U as described:patient will continue current medication therapy.               Follow Up {Instructions Charge Capture  Follow-up Communications :23}     Return in about 3 months (around 4/21/2022).    Patient was given instructions and counseling regarding her condition or for health maintenance advice. Please see specific information pulled into the AVS if appropriate.    Dragon disclaimer:   Much of this encounter note is an electronic transcription/translation of spoken language to printed text. The electronic translation of spoken language may permit erroneous, or at times, nonsensical words or phrases to be inadvertently transcribed; Although I have reviewed the note for such errors, some may still exist.     Additional Patient Counseling:       There are no  Patient Instructions on file for this visit.

## 2022-01-28 ENCOUNTER — INFUSION (OUTPATIENT)
Dept: ONCOLOGY | Facility: HOSPITAL | Age: 42
End: 2022-01-28

## 2022-01-28 DIAGNOSIS — Z45.2 ENCOUNTER FOR FITTING AND ADJUSTMENT OF VASCULAR CATHETER: Primary | ICD-10-CM

## 2022-01-28 PROCEDURE — 96523 IRRIG DRUG DELIVERY DEVICE: CPT

## 2022-01-28 PROCEDURE — 25010000002 HEPARIN LOCK FLUSH PER 10 UNITS: Performed by: INTERNAL MEDICINE

## 2022-01-28 RX ORDER — HEPARIN SODIUM (PORCINE) LOCK FLUSH IV SOLN 100 UNIT/ML 100 UNIT/ML
500 SOLUTION INTRAVENOUS AS NEEDED
Status: DISCONTINUED | OUTPATIENT
Start: 2022-01-28 | End: 2022-01-28 | Stop reason: HOSPADM

## 2022-01-28 RX ORDER — SODIUM CHLORIDE 0.9 % (FLUSH) 0.9 %
10 SYRINGE (ML) INJECTION AS NEEDED
Status: DISCONTINUED | OUTPATIENT
Start: 2022-01-28 | End: 2022-01-28 | Stop reason: HOSPADM

## 2022-01-28 RX ORDER — SODIUM CHLORIDE 0.9 % (FLUSH) 0.9 %
10 SYRINGE (ML) INJECTION AS NEEDED
Status: CANCELLED | OUTPATIENT
Start: 2022-01-28

## 2022-01-28 RX ORDER — HEPARIN SODIUM (PORCINE) LOCK FLUSH IV SOLN 100 UNIT/ML 100 UNIT/ML
500 SOLUTION INTRAVENOUS AS NEEDED
Status: CANCELLED | OUTPATIENT
Start: 2022-01-28

## 2022-01-28 RX ADMIN — Medication 500 UNITS: at 10:35

## 2022-01-28 RX ADMIN — SODIUM CHLORIDE, PRESERVATIVE FREE 10 ML: 5 INJECTION INTRAVENOUS at 10:35

## 2022-02-01 ENCOUNTER — APPOINTMENT (OUTPATIENT)
Dept: ONCOLOGY | Facility: HOSPITAL | Age: 42
End: 2022-02-01

## 2022-03-08 ENCOUNTER — TELEPHONE (OUTPATIENT)
Dept: ONCOLOGY | Facility: CLINIC | Age: 42
End: 2022-03-08

## 2022-03-08 NOTE — TELEPHONE ENCOUNTER
Caller: Carole Weaver    Relationship to patient: Self    Best call back number: [phone]    Chief complaint: CANC./ELIZABETH., DUE TO THE FACT THAT PATIENT CALLED BACK IN JAN. TO SWITCH THE SCAN TO THE CC FROM THE [facility]. BUT THE DATE WAS CHANGED & SHE ALREADY TOOK THE 14TH OFF WORK.    Type of visit: PORT FLUSH & SCAN    Requested date: 3/14/2022    If rescheduling, when is the original appointment: 3/15/2022    Additional notes: WILL THEY DO THE FLUSH DOWN IN RADIOLOGY?

## 2022-03-14 ENCOUNTER — APPOINTMENT (OUTPATIENT)
Dept: CT IMAGING | Facility: HOSPITAL | Age: 42
End: 2022-03-14

## 2022-03-15 ENCOUNTER — INFUSION (OUTPATIENT)
Dept: ONCOLOGY | Facility: HOSPITAL | Age: 42
End: 2022-03-15

## 2022-03-15 ENCOUNTER — HOSPITAL ENCOUNTER (OUTPATIENT)
Dept: PET IMAGING | Facility: HOSPITAL | Age: 42
Discharge: HOME | End: 2022-03-15
Admitting: INTERNAL MEDICINE

## 2022-03-15 DIAGNOSIS — D68.51 FACTOR V LEIDEN MUTATION: ICD-10-CM

## 2022-03-15 DIAGNOSIS — C20 ADENOCARCINOMA OF RECTUM: ICD-10-CM

## 2022-03-15 LAB
ALBUMIN SERPL-MCNC: 3.7 G/DL (ref 3.5–5.2)
ALBUMIN/GLOB SERPL: 1.3 G/DL (ref 1.1–2.4)
ALP SERPL-CCNC: 85 U/L (ref 38–116)
ALT SERPL W P-5'-P-CCNC: 29 U/L (ref 0–33)
ANION GAP SERPL CALCULATED.3IONS-SCNC: 8.4 MMOL/L (ref 5–15)
AST SERPL-CCNC: 17 U/L (ref 0–32)
BASOPHILS # BLD AUTO: 0.01 10*3/MM3 (ref 0–0.2)
BASOPHILS NFR BLD AUTO: 0.2 % (ref 0–1.5)
BILIRUB SERPL-MCNC: <0.2 MG/DL (ref 0.2–1.2)
BUN SERPL-MCNC: 11 MG/DL (ref 6–20)
BUN/CREAT SERPL: 14.3 (ref 7.3–30)
CALCIUM SPEC-SCNC: 8.7 MG/DL (ref 8.5–10.2)
CEA SERPL-MCNC: 0.83 NG/ML
CHLORIDE SERPL-SCNC: 107 MMOL/L (ref 98–107)
CO2 SERPL-SCNC: 24.6 MMOL/L (ref 22–29)
CREAT BLDA-MCNC: 0.7 MG/DL (ref 0.6–1.3)
CREAT SERPL-MCNC: 0.77 MG/DL (ref 0.6–1.1)
DEPRECATED RDW RBC AUTO: 48 FL (ref 37–54)
EGFRCR SERPLBLD CKD-EPI 2021: 98.9 ML/MIN/1.73
EOSINOPHIL # BLD AUTO: 0.12 10*3/MM3 (ref 0–0.4)
EOSINOPHIL NFR BLD AUTO: 2.7 % (ref 0.3–6.2)
ERYTHROCYTE [DISTWIDTH] IN BLOOD BY AUTOMATED COUNT: 13.6 % (ref 12.3–15.4)
FERRITIN SERPL-MCNC: 129.5 NG/ML (ref 11–207)
GLOBULIN UR ELPH-MCNC: 2.9 GM/DL (ref 1.8–3.5)
GLUCOSE SERPL-MCNC: 110 MG/DL (ref 74–124)
HCT VFR BLD AUTO: 44.6 % (ref 34–46.6)
HGB BLD-MCNC: 14.6 G/DL (ref 12–15.9)
IMM GRANULOCYTES # BLD AUTO: 0.02 10*3/MM3 (ref 0–0.05)
IMM GRANULOCYTES NFR BLD AUTO: 0.4 % (ref 0–0.5)
IRON 24H UR-MRATE: 43 MCG/DL (ref 37–145)
IRON SATN MFR SERPL: 11 % (ref 14–48)
LYMPHOCYTES # BLD AUTO: 0.57 10*3/MM3 (ref 0.7–3.1)
LYMPHOCYTES NFR BLD AUTO: 12.7 % (ref 19.6–45.3)
MCH RBC QN AUTO: 31.6 PG (ref 26.6–33)
MCHC RBC AUTO-ENTMCNC: 32.7 G/DL (ref 31.5–35.7)
MCV RBC AUTO: 96.5 FL (ref 79–97)
MONOCYTES # BLD AUTO: 0.48 10*3/MM3 (ref 0.1–0.9)
MONOCYTES NFR BLD AUTO: 10.7 % (ref 5–12)
NEUTROPHILS NFR BLD AUTO: 3.3 10*3/MM3 (ref 1.7–7)
NEUTROPHILS NFR BLD AUTO: 73.3 % (ref 42.7–76)
NRBC BLD AUTO-RTO: 0 /100 WBC (ref 0–0.2)
PLATELET # BLD AUTO: 168 10*3/MM3 (ref 140–450)
PMV BLD AUTO: 8.5 FL (ref 6–12)
POTASSIUM SERPL-SCNC: 4 MMOL/L (ref 3.5–4.7)
PROT SERPL-MCNC: 6.6 G/DL (ref 6.3–8)
RBC # BLD AUTO: 4.62 10*6/MM3 (ref 3.77–5.28)
SODIUM SERPL-SCNC: 140 MMOL/L (ref 134–145)
TIBC SERPL-MCNC: 396 MCG/DL (ref 249–505)
TRANSFERRIN SERPL-MCNC: 283 MG/DL (ref 200–360)
WBC NRBC COR # BLD: 4.5 10*3/MM3 (ref 3.4–10.8)

## 2022-03-15 PROCEDURE — 82378 CARCINOEMBRYONIC ANTIGEN: CPT | Performed by: INTERNAL MEDICINE

## 2022-03-15 PROCEDURE — 83540 ASSAY OF IRON: CPT

## 2022-03-15 PROCEDURE — 80053 COMPREHEN METABOLIC PANEL: CPT

## 2022-03-15 PROCEDURE — 0 DIATRIZOATE MEGLUMINE & SODIUM PER 1 ML: Performed by: INTERNAL MEDICINE

## 2022-03-15 PROCEDURE — 82565 ASSAY OF CREATININE: CPT

## 2022-03-15 PROCEDURE — 25010000002 IOPAMIDOL 61 % SOLUTION: Performed by: INTERNAL MEDICINE

## 2022-03-15 PROCEDURE — 82728 ASSAY OF FERRITIN: CPT

## 2022-03-15 PROCEDURE — 74177 CT ABD & PELVIS W/CONTRAST: CPT

## 2022-03-15 PROCEDURE — 84466 ASSAY OF TRANSFERRIN: CPT

## 2022-03-15 PROCEDURE — 85025 COMPLETE CBC W/AUTO DIFF WBC: CPT

## 2022-03-15 PROCEDURE — 71260 CT THORAX DX C+: CPT

## 2022-03-15 RX ADMIN — IOPAMIDOL 85 ML: 612 INJECTION, SOLUTION INTRAVENOUS at 09:30

## 2022-03-15 RX ADMIN — DIATRIZOATE MEGLUMINE AND DIATRIZOATE SODIUM 30 ML: 660; 100 LIQUID ORAL; RECTAL at 08:35

## 2022-03-16 DIAGNOSIS — F41.9 ANXIETY: ICD-10-CM

## 2022-03-16 DIAGNOSIS — C20 ADENOCARCINOMA OF RECTUM: ICD-10-CM

## 2022-03-16 RX ORDER — ONDANSETRON HYDROCHLORIDE 8 MG/1
8 TABLET, FILM COATED ORAL 3 TIMES DAILY PRN
Qty: 60 TABLET | Refills: 0 | Status: SHIPPED | OUTPATIENT
Start: 2022-03-16 | End: 2022-06-07 | Stop reason: SDUPTHER

## 2022-03-17 RX ORDER — CLONAZEPAM 1 MG/1
TABLET ORAL
Qty: 90 TABLET | Refills: 1 | Status: SHIPPED | OUTPATIENT
Start: 2022-03-17 | End: 2022-08-24

## 2022-03-23 ENCOUNTER — OFFICE VISIT (OUTPATIENT)
Dept: ONCOLOGY | Facility: CLINIC | Age: 42
End: 2022-03-23

## 2022-03-23 ENCOUNTER — APPOINTMENT (OUTPATIENT)
Dept: LAB | Facility: HOSPITAL | Age: 42
End: 2022-03-23

## 2022-03-23 VITALS
DIASTOLIC BLOOD PRESSURE: 89 MMHG | RESPIRATION RATE: 18 BRPM | HEART RATE: 116 BPM | TEMPERATURE: 97.5 F | SYSTOLIC BLOOD PRESSURE: 119 MMHG | BODY MASS INDEX: 35.4 KG/M2 | WEIGHT: 212.5 LBS | OXYGEN SATURATION: 95 % | HEIGHT: 65 IN

## 2022-03-23 DIAGNOSIS — C78.01 MALIGNANT NEOPLASM METASTATIC TO BOTH LUNGS: ICD-10-CM

## 2022-03-23 DIAGNOSIS — C20 ADENOCARCINOMA OF RECTUM: Primary | ICD-10-CM

## 2022-03-23 DIAGNOSIS — C78.02 MALIGNANT NEOPLASM METASTATIC TO BOTH LUNGS: ICD-10-CM

## 2022-03-23 PROCEDURE — 99215 OFFICE O/P EST HI 40 MIN: CPT | Performed by: INTERNAL MEDICINE

## 2022-03-23 RX ORDER — AMOXICILLIN AND CLAVULANATE POTASSIUM 875; 125 MG/1; MG/1
1 TABLET, FILM COATED ORAL 2 TIMES DAILY
Qty: 14 TABLET | Refills: 0 | Status: SHIPPED | OUTPATIENT
Start: 2022-03-23 | End: 2022-04-12

## 2022-03-23 NOTE — PROGRESS NOTES
REASON FOR FOLLOW UP:     1. Rectal cancer, rectal ultrasound examination in July 2019 reported T3N0 disease without lymphadenopathy. She does have small pulmonary nodule 6-7 mm and 2 mm with indeterminate feature. There is no solid evidence of metastatic disease otherwise. Patient has stage IIA (T3N0M0) disease.  2. The patient is heterozygous factor V Leiden, was on prophylactic anticoagulation with Lovenox 40 mg daily given her increased risk of thrombosis with MediPort and GI malignancy.   3. PET scan on 8/8/2019 reported an 8 mm hypermetabolic right upper lobe pulmonary nodule, which is suspicious for metastatic as well.    4. Patient had a PowerPort placement on the left upper chest by Dr. Joseph on 8/9/2019.  5. Patient was started on concurrent infusional 5-FU chemoradiation therapy on 8/12/2019, with planned complete surgical resection and further adjuvant chemotherapy with intention to cure the disease.   6. Patient underwent surgical resection of the primary rectal cancer by Dr. Ye on 12/2/2019.  Pathology evaluation reported residual T3N1 disease stage IIIb.  7. Diarrhea related to therapy and radiation.   8. Patient was started cycle 1 day 1 of adjuvant FOLFOX 6 on 1/23/2020.  9. On 2/5/2020 FOLFOX 6 cycle 2 delayed secondary to neutropenia.  Patient was given 3 days of Granix injection.  After cycle #2 we planned 3 days of Granix with each cycle.   10. Patient also intolerant of oral iron.  Patient received 2 doses of IV injectafer on 02/05/2020 and 02/12/2020.   11. 02/12/2020 Proceed with cycle #2 of FOLFOX 6 with 3 days of Granix.  12. On 3/11/2020 cycle 4 postponed secondary to abdominal pain and occasional low-grade fevers.  CT scans ordered.  13. Cycle #4 resumed after CT scan revealed no evidence of disease.  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 Ye. patient has no fever.  Continue to observe.   14. Grade 3  hand-foot syndrome secondary to 5-FU after cycle #7 FOLFOX 6 chemotherapy, prompting ER visit.  Also has worsening peripheral neuropathy.   15. Cycle #8 FOLFOX 6 was given on 5/13/2020, with 50% dose reduction for both 5-FU and oxaliplatin, and also examination of bolus 5-FU.   16. PET scan examination on 5/21/2020 reported no evidence of metastatic disease in the chest abdomen pelvis.  Stable 6 mm RUL pulmonary nodule.  17. On 5/27/2020, I discussed with the patient that we can discontinue chemotherapy at this time.   18. Patient had a surgical procedure for low anterior colon resection, coloanal anastomosis on 8/3/2020.  19. CT scan for chest abdomen pelvis on 9/9/2020 reported a new 8 mm noncalcified pulmonary nodule in the left lower lobe of the lung.  Otherwise stable right upper lobe 6 mm pulmonary nodule, and no evidence of disease recurrence in the abdomen or pelvis.  20. PET scan examination on 9/18/2020 reported multiple hypermetabolic small pulmonary nodules/ new pulmonary nodules.   21. Patient was started on pelvic chemotherapy with FOLFIRI regimen on 10/13/2020.   22. 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 It reported tumor mutation burden 5 Muts/Mb, microsatellite stable, TP53 mutation R282W, and others.   23. Cycle #5 was without irinotecan, due to peripheral neuropathy.  24. 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  Patient was switched to weight-based Lovenox injection.  25. Cycle #6 5-FU and irinotecan was delayed by 2 weeks because of the above incident.  26. Patient had a telemedicine evaluation at that the [facility] cancer [place] in February 2021.  They agreed with our treatment plan for palliative chemotherapy followed by maintenance chemotherapy.    27. PET scan examination on 4/6/2021 after cycle #12 palliative chemotherapy reported no  evidence of hypermetabolic metastatic lesion.   28. Patient was started on maintenance chemotherapy with 5-FU and Avastin on 4/13/2021. (Unable to tolerate Xeloda because of a significant hand-foot syndrome).   29. PET scan on 9/24/2021 obtained after cycle #12 maintenance chemotherapy with 5-FU, leucovorin, Avastin reported no evidence for active disease. We recommended 3 months chemotherapy holiday.      HISTORY OF PRESENT ILLNESS:  The patient is a 41 y.o. female the above-mentioned history who is here today for lab review and evaluation to discuss the results of CT scan examination for chest abdomen pelvis obtained recently.    Patient reports recently she had a ER visit being in the November 2021 because of pain in the swelling in the left abdominal wall. She went to ER, had CT of the abdomen pelvis with IV contrast. The study reported subcutaneous changes in the abdomen wall at the site of injection. He needed this patient had a abscess in the left lower quadrant abdomen wall which was drained in the ER and she was started on antibiotics.    Patient reports the wound has been healing well. No active drainage. She denies fever sweating or chills.     Patient is at her baseline condition. Denies fever sweating chills. No evidence of GI bleeding, she continues to have colostomy in place. Denies dysuria or hematuria. She continues on Lovenox anticoagulation.      She denies other concerns this time.    Patient had CT scan for chest with IV contrast obtained on 12/14/2021 which reported small tiny stable pulmonary nodules. There is a 4 mm right upper lobe pulmonary nodule. There is also a stable 4 mm left lower lobe pulmonary nodule. There is also stable left upper lobe micronodule.     Laboratory studies today on 12/21/2021 reported normal hemoglobin 15.9 however .0, platelets 218,000 WBC 5360 including neutrophils 3730 lymphocytes 980. Chemistry lab reported normal renal function, electrolytes, glucose,  and marginally elevated ALT 55, AST 34 but normal total bilirubin and alk phosphatase.       Past Medical History:   Diagnosis Date   • Abdominopelvic abscess (HCC) 08/12/2020    ADMITTED TO [facility]   • Abnormal Pap smear of cervix 02/02/1998    JULIUS I   • Anemia in pregnancy    • Anemia in pregnancy    • Anxiety    • Bilateral epiphora 04/2020   • Bilateral hand swelling 05/02/2020    SEEN AT [facility] ER   • Cervical lymphadenitis 06/06/2012    SEEN AT [facility] ER   • Chemotherapy induced diarrhea 01/2021   • Chemotherapy induced neutropenia (HCC) 04/2020   • Chemotherapy-induced nausea 02/2020   • Chemotherapy-induced thrombocytopenia 05/2020   • Chronic diarrhea    • Colon polyps     FOLLOWED BY DR. GERONIMO ESPARZA   • Cystitis 04/24/2020    WITH DEHYDRATION, ADMITTED TO [facility]   • Cystitis 10/27/2020   • Depression    • Drug-induced peripheral neuropathy ([facility]) 05/2020   • Factor V Leiden mutation ([facility])    • Fistula of intestine    • GERD (gastroesophageal reflux disease)    • Hand foot syndrome 05/2020   • Heart murmur     IN CHILDHOOD   • Hematochezia    • Hemorrhoids    • Heterozygous factor V Leiden mutation ([facility])     DX 8-2-2019   • History of anemia    • History of chemotherapy 2019    FOLLOWED BY DR. ALEXANDRU HUNT   • History of gestational diabetes    • History of pre-eclampsia 1998   • History of radiation therapy 2019    FOLLOWED BY DR. JAVON LEWIS   • History of TB skin testing 01/17/2009    TB Skin Test   • HPV in female 1998   • Hypokalemia 09/2019   • Hypomagnesemia 09/2019   • Hyponatremia 06/2021   • IBS (irritable bowel syndrome)    • Ileostomy in place ([facility])     FOLLOWED BY DR. GERONIMO ESPARZA   • Infected insect bite of neck 05/27/2016    SEEN AT [facility]   • Kidney stones 08/09/2007    SEEN AT [facility] ER   • Lump of right breast     SWOLLEN LYMPH NODE   • Lung cancer ([facility]) 09/28/2020    METASTATIC LUNG CANCER   • Macrocytosis 07/2021   • Monopolar depression ([facility])    • On anticoagulant therapy    • Pain associated  with surgical procedure 2020   • Palmar-plantar erythrodysesthesia 2021   • Perirectal abscess 2020   • Pulmonary embolism without acute cor pulmonale (HCC) 09/20/2019    X 3; ADMITTED TO [facility]   • Pulmonary nodule, right 2020   • Rectal cancer (HCC) 2019    STAGE IIA, INVASIVE MODERATELY DIFFERENTIATED ADENOCARCINOMA, CHEMO AND XRT FINISHED 2019   • Right shoulder pain 2020    SEEN AT [facility] ER   • Right ureteral stone 10/01/2019    SEEN AT [facility] ER   • SAB (spontaneous ) 1996     A2-1 INDUCED   • Sciatica    • Sepsis due to cellulitis (HCC) 2002    LEFT GREAT TOE, ADMITTED TO [facility]   • Tachycardia 2020   • Thrombosis of superior vena cava (HCC) 2020    AND BRACHIOCEPHALIC VEIN, ADMITTED TO [facility]   • Urinary urgency 2020     Past Surgical History:   Procedure Laterality Date   • ABDOMINAL SURGERY     • CHOLECYSTECTOMY N/A 10/10/2003    LAPAROSCOPIC WITH CHOLANGIOGRAM, DR. JAMEY TALAVERA AT [facility]   • COLON RESECTION N/A 2019    Procedure: laparoscopic low anterior colon resection with mobilization of splenic flexure and diverting loop ileostomy: ERAS;  Surgeon: Padmaja Esparza MD;  Location: [facility] MAIN OR;  Service: General   • COLON RESECTION N/A 8/3/2020    Procedure: LOW ANTERIOR COLON RESECTION, COLOANAL ANASTOMOSIS, MOBILIZATION SPLENIC FLEXURE;  Surgeon: Padmaja Esparza MD;  Location: [facility] MAIN OR;  Service: General;  Laterality: N/A;   • COLONOSCOPY N/A 7/15/2020    PATENT ANASTAMOSIS IN MID RECTUM, RESCOPE IN 1 YR, DR. PADMAJA ESPARZA AT [facility]   • COLONOSCOPY W/ POLYPECTOMY N/A 2019    15 MM TUBULOVILLOUS ADENOMA POLYP IN HEPATIC FLEXURE, 20 MMTUBULOVILLOUS ADENOMA WITH HIGH GRADE DYSPLASIA IN RECTOSIGMOID, 4 CM MASS IN MID RECTUM, PATH: INVASIVE MODERATELY DIFFERENTIATED ADENOCARCINOMA, DR. JENNIFER LI AT [facility]   • DILATATION AND EVACUATION N/A    • ENDOSCOPY N/A 2019    LA GRADE A ESOPHAGITIS, GASTRITIS, ALL  BIOPSIES BENIGN, DR. ROLAND LI AT [facility]   • INCISION AND DRAINAGE PERIRECTAL ABSCESS N/A 8/14/2020    Procedure: INCISION AND DRAINAGE OF retrorectal dehiscence abcess with drain placement and irrigation;  Surgeon: Geronimo Ye MD;  Location: [facility] MAIN OR;  Service: General;  Laterality: N/A;   • PAP SMEAR N/A 01/24/2014   • SIGMOIDOSCOPY N/A 7/24/2019    INFILTRATIVE PARTIALLY OBSTRUCTING LARGE RECTAL CANCER, AREA TATOOED, DR. GERONIMO YE AT [facility]   • SIGMOIDOSCOPY N/A 11/23/2019    AN ULCERATED PARTIALLY OBSTRUCTING MASS IN MID RECTUM, AREA TATTOOED, DR. EGRONIMO YE AT [facility]   • SIGMOIDOSCOPY N/A 7/22/2021    PATENT ANASTAMOSIS IN DISTAL RECTUM, RESCOPE IN 1 YR, DR. GERONIMO YE AT [facility]   • TRANSRECTAL ULTRASOUND N/A 7/24/2019    Procedure: ULTRASOUND TRANSRECTAL;  Surgeon: Geronimo Ye MD;  Location: [facility] ENDOSCOPY;  Service: General   • URETEROSCOPY LASER LITHOTRIPSY WITH STENT INSERTION Right 10/30/2019    DR. ESTUARDO BEASLEY AT [place]   • VAGINAL DELIVERY N/A 09/18/1998   • VENOUS ACCESS DEVICE (PORT) INSERTION Left 8/9/2019    Procedure: INSERTION VENOUS ACCESS DEVICE;  Surgeon: Sj Joseph MD;  Location: [facility] OR [facility];  Service: General   • VENOUS ACCESS DEVICE (PORT) INSERTION N/A 12/18/2020    Procedure: INSERTION VENOUS ACCESS DEVICE right side, removal venous access device left side;  Surgeon: Sj Joseph MD;  Location: [facility] OR [facility];  Service: General;  Laterality: N/A;   • WISDOM TOOTH EXTRACTION Bilateral 1993       HEMATOLOGIC/ONCOLOGIC HISTORY:      The patient reports she has intermittent rectal bleeding and urgency, mucous with her stool, starting sometime in 2016. At that time she was referred to GI service, and was diagnosed of irritable bowel syndrome. Nevertheless she had worsening urgency for bowel movement, and had a couple of incidents losing control of stool. She was recently seen by Roland Li MD again and had colonoscopy and EGD exam on  07/08/2019. She was found having a circumferential rectal mass. According to the procedure note, the patient had a fungating circumferential bleeding 4 cm mass in the middle rectum, at distance between 13 cm and 17 cm from the anus. Mass was causing partial obstruction. There were also colon polyps found at the hepatic flexure and also at the rectosigmoid junction 23 cm from the anus. Both were resected and retrieved. EGD examination reported grade A esophagitis at the GE junction and patchy discontinuous erythema and aggravation of the mucosa without bleeding in the stomach body. There is normal mucosa of the duodenum. Pathology evaluation from this procedure reported moderately differentiated adenocarcinoma involving the rectal mass. The rectal sigmoid junction polyp was tubular/tubulovillous adenoma with high grade dysplasia negative for invasive malignancy. The hepatic flexure polyp was also tubular/tubulovillous adenoma negative for high grade dysplasia or malignancy. The biopsy from the esophagus reports squamocolumnar mucosa with inflammatory changes suggestive of mild reflux esophagitis, negative for interstitial metaplasia. Gastric biopsy was benign and duodenal biopsy was also benign. There is no mention of H-pylori.     Patient was subsequently referred to colorectal surgeon Padmaja Ye MD for further evaluation. The patient had CT scan examination for chest, abdomen and pelvis requested by Dr. Ye and were done on 07/13/2019. The study reported no evidence of lymphadenopathy in the abdomen and pelvis. There was wall thickening of the rectosigmoid junction. Normal spleen, pancreas, adrenal glands and kidneys. There was fatty liver infiltration but no focal lymphatic lesions. There was a small 6-7 mm noncalcified nodule in the right upper lobe and a tiny 3 mm subpleural nodule in the right middle lobe. No mediastinal or hilar lymphadenopathy.     Dr. Ye performed staging rectal ultrasound  examination. This study reported tumor penetrating through the muscularis propria as T3 disease; there were no lymph nodes identified.    She had a hospitalization in late September 2019 because of newly discovered pulmonary emboli.  She was on prophylactic Lovenox prior to the incident of PE.  Now she is on full dose Xarelto anticoagulation.  Patient reports no further chest pain dyspnea.  She denies bleeding or bruising.  During her hospitalization, she was seen by Dr. Ye, who plans to have surgery 8 to 12 weeks after finishing radiation therapy.  She finished her radiation on 9/19/2019.     I noticed patient also presented to the emergency room on 10/1/2019 complaining of right flank area pain.  I reviewed the images studies and indeed she has a very small 1 to 2 mm obstructing kidney stone in the UV junction.  Patient is still symptomatic with some pains and dysuria.  She denies fever sweating or chills.    Laboratory study on 10/7/2019 reported normal CBC including hemoglobin 13.1, platelets 301,000, WBC 6170 and ANC 4900 lymphocytes 590 monocytes 480.      The nurse reported malfunction of port-a-catheter on 10/7/2019.  Port study on 10/8/2019 showed fibrin sheath around the distal aspect of the Mediport catheter in the SVC. This does not appear to hinder injection, but does prevent aspiration at this time.    Patient underwent surgical resection of the colon on 12/2/2019 with Dr. Ye.  Pathology evaluation reported residual T3 disease, also 1 out of 14 lymph nodes positive for malignancy.  So this patient in either had at least stage IIIb disease (T3 N1 M0?).      Adjuvant chemotherapy FOLFOX 6 will be started on 1/22/2020.  Laboratory study reported iron saturation 10%, free iron 31 TIBC 319 and ferritin 168.  Her hemoglobin was 11.8, WBC 5600, and platelets 347,000.    Patient was here on 02/12/2020 for cycle 2 of her FOLFOX.  This is delayed x1 week secondary to neutropenia.  The patient ultimately  received 3 days worth of Neupogen with recovery of her blood counts.  Of note, the patient struggled with significant nausea without any episodes of vomiting following cycle #1 of chemotherapy resulting in significant weight loss.  She is up 12 pounds over the last week as her appetite has normalized.  We will add Emend to her care plan.    She is having several loose stools per her colostomy and has been hesitant to take Imodium due to prior history of constipation.  I encouraged her to try this with a maximum of 8 tablets/day.  She denies any infectious symptoms including fevers or chills.  No excess bleeding or bruising noted.  She had the expected cold sensitivity related to the Oxaliplatin for about 3 days following treatment.    Labs from 02/12/2020 demonstrated total white blood cell count of 5.14, ANC of 3.06, hemoglobin of 11.2, platelets of 211,000.  She was found to be iron deficient last week and is intolerant to oral iron secondary to GI distress.  For this reason, she initiated IV iron therapy with Injectafer last week.  She had received her second dose 02/12/2020    Patient has normalized hemoglobin 12.2 on 2/26/2020.    She reported on 5/5/2020 she had a recent visit to the emergency department for what was suspected to be an allergic reaction.  She called our on-call service on Saturday with reports of hand and face swelling.  She was instructed to proceed to the emergency department at which time she was assessed and prescribed a Medrol Dosepak.  She had just completed her 5-FU and was unhooked on Friday, 5/3/2020.  Her symptoms have improved.  She does report persistent hyperpigmentation and mild swelling of the palms of the hands but this is much improved.  It was her right hand specifically that was swollen.  Her facial swelling has resolved.  She continues on Cefdinir nd since has 1 day remaining, she was prescribed cefdinir for a UTI requiring hospitalization at the end of April.  Reports no  new symptoms.  Her labs are stable.  She is scheduled for treatment again.    Patient states at this time she is not tolerating her chemotherapy well.     Patient seen in the emergency department on 5/2/2020 for what was suspected to be an allergic reaction.  She called our on-call service on Saturday explaining that she was experiencing hand and face swelling.  She was instructed to proceed to the emergency department at which time she was assessed and prescribed a Medrol Dosepak.  She had just completed her 5-FU and was unhooked on Friday, 5/1/2020.      She reports since her ED visit on 5/2/2020 her symptoms have not improved. Her hands and feet were swollen upon presenting to the ED and she could barely make a fist. She states that she feels so swollen she is not able to stand it. She states that her skin on the hands and feet are peeling extensively as well, besides changing color to more dark.     She also reports significant fatigue after her first week of the 5-FU treatment but she expected this side effect. She also notices significant increase in her neuropathy to the point that she is not able to even walk around in her home without her house slippers due to irritation from her rugs. She denies and associated nausea or vomiting at this time. She also has episodes of epistaxis every day after the chemotherapy cycle #7.  She does report working full-time during the week of chemotherapy.    Laboratory studies, 5/13/2020, show moderate thrombocytopenia platelets 123,000, low normal WBC 4140 including ANC 2720 and normal hemoglobin 13.4.  Because significant hand-foot syndrome, will decrease both 5-FU and oxaliplatin by 50%, and eliminate bolus dose of 5-FU.    On 5/21/2020 patient had a PET scan performed which showed no convincing evidence of residual disease in abdomen or pelvis, no metastatic disease within the chest or neck.     Cycle #8 FOLFOX 6 was given on 5/13/2020, with 50% dose reduction for both  5-FU and oxaliplatin, and also examination of bolus 5-FU.  She states on 5/27/2020 that with the recent reduction of the chemotherapy she feels significantly better. She has more energy and is able to do her daily routine.      PET scan examination on 5/21/2020 reported no evidence of metastatic disease in chest abdomen pelvis.  There was a stable 6 mm right upper lobe pulmonary nodule.    Laboratory studies on 5/27/2020 showed mild leukocytopenia WBC 3720 but a normal ANC 2250 and lymphocytes 630.  Normal platelets 163,000 and hemoglobin 12.6.  Chemistry lab reported normal renal function, liver function panel and a electrolytes, elevated glucose 164.    Laboratory studies 6/24/2020, showed normal hemoglobin 13.4 but macrocytosis .9.  Normal platelets 210,000 and WBC 5870.  She had normal CMP.     Patient last time was here in late June 2020.  Since that time she had surgical procedure for low anterior colon resection, coloanal anastomosis on 8/3/2020.  She later developed a perirectal abscess, required surgical drainage on 8/14/2020.  She was discharged on 8/27/2020.    Patient reports to me she still has lower abdominal wall vacuum suction in place.  She denies fever sweating chills.  Performance status is ECOG 1.  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  She does have visiting nurse come to the home for wound care.    CT scan for chest abdomen pelvis on 9/9/2020 reported a new 8 mm noncalcified pulmonary nodule in the left lower lobe.  Otherwise stable right upper lobe 6 mm pulmonary nodule, and no evidence of disease recurrence in the abdomen or pelvis.     Laboratory study on 9/16/2020 reported elevated AST 55, ALT 95, alk phosphatase 143 but normal total bilirubin 0.3.  Chemistry lab otherwise unremarkable.  She has completed normal CBC.      Because of the abnormal CT scan examination for chest abdomen pelvis on 9/9/2020 reported a new 8 mm noncalcified pulmonary nodule  in the left lower lobe, we obtained a PET scan examination for further evaluation, which was done on 9/18/2020.  This study reported several pulmonary nodules, some of them are hypermetabolic, newly developed compared to previous PET scan in May 2020.  Certainly does highly suspicious for metastatic disease.  There are also hypermetabolic activity in the abdomen and pelvic area which is hard to differentiate from surgical scar versus metastatic malignancy.    Laboratory study on 10/13/2020 reported normal CBC with Hb 13.9, platelets 302,000 and WBC 5520 including ANC 3830.  Chemistry lab reported normalized AST 20, alk phosphatase 116 improved ALT 35, and maintains normal bilirubin, with normal electrolytes and liver function panel.     Patient was started on first cycle of palliative chemotherapy FOLFIRI on 10/13/2020.    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  Patient had a new port placed 12/18/2020, and had held her Xarelto for 2 days prior to surgery.  She presented to the ER on 12/21/20 with complaints of facial and arm swelling for 3 days.  She also noted increased shortness of breath.  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  Thrombus within the right internal jugular vein cannot be excluded.  Patient was started on IV heparin, and eventually transitioned to weight-based Lovenox, which she now continues.    PET scan examination on 9/24/2021 reported further shrinking of the tiny right upper lobe pulmonary nodule.  Otherwise no new lesions.  No evidence for metastatic disease in other areas.    MEDICATIONS    Current Outpatient Medications:   •  clonazePAM (KlonoPIN) 1 MG tablet, TAKE 1 TABLET BY MOUTH 3 TIMES A DAY AS NEEDED FOR ANXIETY OR SEIZURES, Disp: 90 tablet, Rfl: 1  •  Cyanocobalamin (VITAMIN B-12 PO),  Take  by mouth., Disp: , Rfl:   •  dicyclomine (BENTYL) 20 MG tablet, TAKE 1 TABLET BY MOUTH EVERY 6 HOURS AS NEEDED, Disp: 360 tablet, Rfl: 0  •  diphenoxylate-atropine (LOMOTIL) 2.5-0.025 MG per tablet, TAKE 1 TABLET BY MOUTH 4 (FOUR) TIMES A DAY AS NEEDED FOR DIARRHEA., Disp: 120 tablet, Rfl: 1  •  enoxaparin (LOVENOX) 100 MG/ML solution syringe, INJECT 0.9 ML UNDER THE SKIN INTO THE APPROPRIATE AREA AS DIRECTED EVERY 12 (TWELVE) HOURS., Disp: 60 each, Rfl: 2  •  enoxaparin (LOVENOX) 300 MG/3ML solution, Inject  as directed See Admin Instructions., Disp: , Rfl:   •  escitalopram (LEXAPRO) 20 MG tablet, TAKE 1 TABLET BY MOUTH EVERY DAY, Disp: 90 tablet, Rfl: 3  •  famotidine (PEPCID) 20 MG tablet, TAKE 1 TABLET BY MOUTH TWICE A DAY, Disp: 180 tablet, Rfl: 1  •  fluorouracil in dextrose 5 % 250 mL infusion, Infuse  into a venous catheter 1 (One) Time. Takes twice a month., Disp: , Rfl:   •  Loratadine 10 MG capsule, Take 10 mg by mouth Every Evening., Disp: , Rfl:   •  metoprolol succinate XL (TOPROL-XL) 25 MG 24 hr tablet, Take 0.5 tablets by mouth Every Night., Disp: 45 tablet, Rfl: 3  •  mineral oil-hydrophilic petrolatum (AQUAPHOR) ointment, Apply 1 application topically to the appropriate area as directed As Needed for Dry Skin., Disp: , Rfl:   •  nystatin (MYCOSTATIN) 061768 UNIT/GM cream, Apply  topically to the appropriate area as directed 2 (Two) Times a Day As Needed (rash)., Disp: 30 g, Rfl: 2  •  ondansetron (ZOFRAN) 8 MG tablet, TAKE 1 TABLET BY MOUTH 3 (THREE) TIMES A DAY AS NEEDED FOR NAUSEA OR VOMITING., Disp: 60 tablet, Rfl: 0  •  prochlorperazine (COMPAZINE) 10 MG tablet, Take 1 tablet by mouth Every 6 (Six) Hours As Needed for Nausea or Vomiting., Disp: 30 tablet, Rfl: 2  •  promethazine (PHENERGAN) 25 MG tablet, Take 1 tablet by mouth Every 6 (Six) Hours As Needed for Nausea or Vomiting., Disp: 60 tablet, Rfl: 2  •  amoxicillin-clavulanate (AUGMENTIN) 875-125 MG per tablet, Take 1 tablet by  "mouth 2 (Two) Times a Day., Disp: 14 tablet, Rfl: 0    ALLERGIES:   No Known Allergies    SOCIAL HISTORY:       Social History     Tobacco Use   • Smoking status: Current Every Day Smoker     Packs/day: 1.00     Years: 24.00     Pack years: 24.00     Types: Electronic Cigarette, Cigarettes   • Smokeless tobacco: Never Used   Vaping Use   • Vaping Use: Some days   • Substances: Nicotine   • Devices: Disposable   Substance Use Topics   • Alcohol use: Not Currently     Comment: Caffeine use rare   • Drug use: No         FAMILY HISTORY:   Mother has positive factor V Leiden mutation, although she did not have thrombosis, mother also is coronary disease with stenting, she also is occasional bruising.  Maternal grandmother had DVT, she had multiple surgical procedures.  Patient mother's half-brother had metastatic colon cancer at diagnosis in his 50s.  Maternal great aunt has breast cancer.  Patient will follow his skin cancer in his 60s, exclusive.           Vitals:    03/23/22 1000   BP: 119/89   Pulse: 116   Resp: 18   Temp: 97.5 °F (36.4 °C)   TempSrc: Temporal   SpO2: 95%   Weight: 96.4 kg (212 lb 8 oz)   Height: 165.1 cm (65\")   PainSc: 0-No pain     Current Status 3/23/2022   ECOG score 0     Physical Exam  Vitals reviewed.   Constitutional:       General: She is not in acute distress.     Appearance: Normal appearance. She is well-developed.   HENT:      Head: Normocephalic and atraumatic.   Eyes:      Pupils: Pupils are equal, round, and reactive to light.   Cardiovascular:      Rate and Rhythm: Regular rhythm. Tachycardia present.      Heart sounds: Normal heart sounds. No murmur heard.  Pulmonary:      Effort: Pulmonary effort is normal. No respiratory distress.      Breath sounds: Normal breath sounds.   Abdominal:      General: Bowel sounds are normal. There is no distension.      Palpations: Abdomen is soft.      Tenderness: There is no abdominal tenderness.      Comments: Ostomy in right abdomen. Scattered " bruises on the abdomen bilaterally from Lovenox injections.   Musculoskeletal:      Cervical back: Normal range of motion.      Right lower leg: No edema.      Left lower leg: No edema.   Skin:     General: Skin is warm and dry.      Findings: Bruising and lesion present. No erythema.      Comments: Left lower abdomen wall had I&D for abscess recently. No active drainage.   Neurological:      Mental Status: She is alert and oriented to person, place, and time. Mental status is at baseline.   Psychiatric:         Mood and Affect: Mood normal.         Thought Content: Thought content normal.         RECENT LABS:  Lab Results   Component Value Date    WBC 4.50 03/15/2022    HGB 14.6 03/15/2022    HCT 44.6 03/15/2022    MCV 96.5 03/15/2022     03/15/2022     Lab Results   Component Value Date    NEUTROABS 3.30 03/15/2022     Lab Results   Component Value Date    GLUCOSE 110 03/15/2022    BUN 11 03/15/2022    CREATININE 0.70 03/15/2022    EGFRIFNONA 79 12/21/2021    BCR 14.3 03/15/2022    K 4.0 03/15/2022    CO2 24.6 03/15/2022    CALCIUM 8.7 03/15/2022    ALBUMIN 3.70 03/15/2022    AST 17 03/15/2022    ALT 29 03/15/2022     Lab Results   Component Value Date    IRON 43 03/15/2022    TIBC 396 03/15/2022    FERRITIN 129.50 03/15/2022     Lab Results   Component Value Date    CEA 0.83 03/15/2022                       IMAGING:  CT Chest With Contrast Diagnostic, CT Abdomen Pelvis With Contrast  Narrative: CT CHEST, ABDOMEN, AND PELVIS WITH IV CONTRAST     HISTORY: Follow-up     TECHNIQUE: Radiation dose reduction techniques were utilized, including  automated exposure control and exposure modulation based on body size.   3 mm images were obtained through the chest, abdomen, and pelvis with IV  contrast.      COMPARISON: CT chest, abdomen and pelvis dating back to 09/09/2020     FINDINGS:      Chest:      No hilar, mediastinal or axillary adenopathy is present by size  criteria. Tiny scattered mediastinal nodes which  are greater in number  than what are typically seen are present and measure up to 0.7 cm in  short axis dimension, grossly unchanged since 09/24/2021.     Right Port-A-Cath tip terminates near the superior cavoatrial junction.  Short segment of mild-to-moderate stenosis of the proximal left  subclavian artery is present secondary to noncalcified atherosclerosis,  as before. No significant pericardial effusion is present.     0.5 cm pulmonary nodule which was previously FDG avid on prior PET is  grossly unchanged in size since 09/24/2021. Bibasilar atelectasis and or  pleural parenchymal scarring is present in the setting of asymmetric  elevation right hemidiaphragm, as before. No new pulmonary  consolidation, pleural effusion or pneumothorax. A 0.5 cm pulmonary  nodule within the left upper lobe (image 33) appears to have minimally  increased in size since 12/14/2021 where it measured up to 0.3 cm. Focus  of groundglass opacification within the periphery of the left upper lobe  (image 41) is new since 12/14/2021. A sub-6 mm pulmonary nodule within  the left upper lobe (image 43) was not definitely seen on 04/06/2021 but  is grossly unchanged since 09/24/2021. A 0.3 cm pulmonary nodule within  the left lower lobe (image 37) is grossly unchanged in overall size  since 09/24/2021 appears to have a more solid appearance. Venous varices  course through the anterior aspect of the chest, as before.     Abdomen and pelvis:      There are no findings of small bowel obstruction. Postsurgical changes  from low anterior resection and right lower quadrant ileostomy formation  are present. The degree of presacral soft tissue thickening is grossly  unchanged since 09/24/2021, measuring up to 2.9 cm in thickness. The  appendix is unremarkable.     The gallbladder is surgically absent. The liver, pancreas, spleen and  adrenal glands have an unremarkable postcontrast CT appearance.  Subcentimeter renal lesions are too small to  characterize. There is no  hydronephrosis.     No abdominopelvic adenopathy by size criteria is present. The bladder is  decompressed and cannot be evaluated. No free intraperitoneal air is  seen. Focus of soft tissue thickening within the posterior aspect the  left paracolic gutter inferiorly has decreased over multiple prior CTs  where it previously demonstrated central fat density, suggestive of  resolving fat necrosis. Varices course through the anterior aspect of  the abdominal wall and have become slightly more prominent over multiple  prior CTs. Presumed injection granulomas are present within the anterior  abdominal wall, as before..     Bone windows: A few sclerotic lesions within the bilateral aspects of  the pelvis Are grossly unchanged since 09/19/2020.   No new suspicious  lytic or blastic osseous lesions are present.      Impression: Impression:  1.  A few sub-6 mm pulmonary nodules bilaterally appear slightly more  prominent when compared to 11/29/2021; however, their small size limits  evaluation. Continued close attention on follow-up is recommended with  chest CT in 2-3 months to further characterize.  2.  Focus of groundglass opacification within the left upper lobe new  since 12/14/2021 is favored be reactive. Correlation with patient  history is recommended to exclude acute pneumonia with attention on  above-mentioned follow-up to ensure resolution.  3.  No findings of new metastatic disease in the abdomen or pelvis.  4.  Other findings as above.     This report was finalized on 3/16/2022 7:00 AM by Dr. Kyle Diego M.D.         2. CT OF THE ABDOMEN AND PELVIS WITH CONTRAST 11/29/2021      HISTORY: Left lower quadrant abdominal wall abscess     COMPARISON: 09/24/2021     TECHNIQUE: Axial CT imaging was obtained through the abdomen and pelvis.  IV contrast was administered.     FINDINGS:  There is some nonspecific right basilar consolidation. It is favored to  represent scarring and  atelectasis. The stomach and duodenum are  unremarkable, as are the adrenal glands, pancreas, and spleen. 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 Stomach appears patulous  and fluid-filled. Correlation with any evidence of ileus or gastric  outlet obstruction suggested. Adrenal glands are normal. The kidneys  enhance symmetrically. There is no hydronephrosis. The urinary bladder  appears normal. Uterus appears normal. No suspicious adnexal masses are  seen. There is a right lower quadrant ileostomy. There are changes of  prior low anterior resection. Thickening within the presacral region is  again noted. There is no evidence of bowel obstruction. The patient has  multiple areas of soft tissue stranding seen within the anterior  abdominal wall bilaterally. Similar foci present on prior exam. When  compared to prior study, there is probably more skin thickening seen  overlying the left lower quadrant, which may reflect cellulitis.  However, discrete drainable fluid collection is seen. There is a trace  amount of gas within the abdominal wall bilaterally. No acute osseous  abnormalities are seen.     IMPRESSION:     1. The patient is again noted to have multiple areas of soft tissue  stranding within the intra-abdominal wall bilaterally. These likely  represent injection sites. However, no discrete drainable abscess is  seen. There may be some mild asymmetric skin thickening seen within the  left lower quadrant compared to the contralateral side. This may  represent cellulitis.  2. Somewhat patulous and fluid-filled appearance to the stomach.  Correlation with any evidence of ileus/gastric outlet obstruction is  suggested.           Assessment/Plan      ASSESSMENT:   1.  Metastatic rectal cancer.   · Initial rectal ultrasound examination reported T3N0  disease without lymphadenopathy.   · CT scan of chest, abdomen and pelvis reported no lymphadenopathy in the abdomen, pelvis or chest. She does have small pulmonary nodule 6-7 mm and 2 mm with indeterminate feature. There is no solid evidence of metastatic disease otherwise.   · Based on the CT scan and rectal ultrasound imaging studies, this patient had stage IIA (T3N0M0) disease.   · She had PET scan examination on 8/8/2019 which reported a hypermetabolic right upper lobe pulmonary nodule 6 mm with SUV 5.6.  This is suspicious for metastatic disease however it is too small to be biopsied.  This patient may have stage IV disease.   · She initiated concurrent radiation with continuous 5-FU on 8/12/2019.  · Patient finished concurrent chemoradiation therapy.  · Patient underwent surgical resection of the rectal tumor and diverting loop ileostomy on 12/2/2019 with Dr. Ye.  Pathology evaluation reported residual T3 disease, with 1 out of 14 lymph nodes positive for malignancy.  Certainly this patient has at least stage IIIb rectal cancer (T3 N1 M0?)  · On 1/22/2020 adjuvant chemotherapy FOLFOX 6 regimen initiated.   · On 2/5/2022 cycle #2 was delayed secondary to neutropenia.  She was given 3 days of Granix.   · Emend added with cycle 3.  With improved nausea control  · Continuing home Granix x3 days following 5-FU disconnect  · 3/11/2020 due for cycle 4, however, she is experiencing progressive abdominal pain and occasional fevers.   · CT scan performed on 3/13/2020 reveals no evidence of progressive or recurrent disease.  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  · Patient hospitalized 4/24-4/26/20 after cycle 5 of chemotherapy with acute UTI.  CT abdomen/pelvis noting fluid collection in the presacral region having diminished in size compared to CT dated 3/13/2020.  Patient was evaluated by Dr. Ye while in the hospital with further plans to evaluate possible colovaginal fistula following completion of  chemotherapy.  Patient did respond to IV antibiotics and discharged home on oral cefdinir.  · 4/29/2020 cycle 6 of FOLFOX.  Urinary symptoms have resolved   · Patient seen in the [facility] ED on 5/2/2020 for what was suspected to be an allergic reaction.  She called our service on Saturday explaining that she was experiencing hand and face swelling.  She was instructed to proceed to the emergency department at which time she was assessed and prescribed a Medrol Dosepak.  She had just completed her 5-FU and was unhooked on Friday, 5/1/2020.  Her symptoms have resolved in the office on assessment, 5/5/2020.  · The patient had grade 3 hand-foot syndrome based on symptomology.  Patient had cycle #8 of 5-FU on 5/13/2020. Due to her symptoms and poor tolerance to the 5-FU, her treatment dose will be reduced 50% for oxaliplatin and infusional 5-FU.  Bolus 5-FU will be discontinued..  · On 5/21/2020 patient had a PET scan and it showed no evidence of of metastatic disease in the neck, chest, abdomen or pelvis.  There was fluid accumulation/abscess.   · On 5/27/2020 discussed with the patient that we can discontinue chemotherapy at this time.  She will follow-up with Dr. Ye to discuss a possibility to reverse the ileostomy.  We likely will obtain anther PET scan in 3 to 4 months for reassessment.    · Patient was seen by Dr. Ye on 6/19/2019 for evaluation and to discuss possible take down of her ileostomy.  She is scheduled to have a gastrografin enema on 7/2/2020 to evaluate for a fistula, and then a colonoscopy on 7/15/2020, both done by Dr. Ye.  She states that based on the above imaging and procedure findings, she may have a more extensive surgery done or just have her ileostomy reversed, which would likely be done in August 2020.  This will all be coordinated under the care of Dr. Ye.     · CT scan from 09/09/2020 and also compared to her previous PET scan examination from 05/21/2020 and also the original  PET scan from 08/09/2019.  The original PET scan there is a small right upper lobe pulmonary nodule 6 mm with SUV 5.6. So in the 05/2020 PET scan the nodule is still there but activity seems much less and this most recent CT scan examination also documented the preexisting small pulmonary nodule however there is a new left lower lobe pulmonary nodule 8 mm in size and I shared with the patient today this nodule is suspicious for metastatic disease. Laboratory studies reported minimal CEA level 1.32 on 09/09/2020. Liver function panel was only marginally abnormal with the ALT 45, the remaining is normal. However laboratory study today showed worsening AST 55, ALT 95 and alkaline phosphatase 143 but is still normal, total bilirubin 0.3.   · Recommended to have repeat PET scan examination for assessment because the left lower lobe pulmonary nodule is too small to be biopsied, and if the PET scan reports hypermetabolic lesion, I recommended to have stereotactic radiation therapy. Explained to patient that she is not a really good candidate to have thoracotomy for resection because she still has unhealed wound in the abdomen. Stereotactic radiation therapy will likely be a more feasible choice.   · PET scan examination obtained on 9/18/2020 showed metastatic disease.  It reported a few hypermetabolic pulmonary nodules, and some new small pulmonary nodules, all of them highly suspicious for metastatic disease.  She also has hypermetabolic activity in the abdomen and pelvic area which could be related to scar tissue from her recent surgery versus metastatic disease.  Nevertheless overall picture fits with metastatic cancer.  · Discussed with the patient on 9/20/2020, we reviewed the PET scan images together, and I recommended to have systematic chemotherapy, I do not think stereotactic reading therapy to the hypermetabolic lesions in the lungs warranted at this time because even those will be treated with reading therapy,  she will still need systematic chemotherapy.  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  I did discuss with the patient using anti-EGFR monoclonal antibody versus anti-VEGF monoclonal antibody.     · Palliative chemotherapy cycle#1 FOLFIRI was started on 10/13/2020.  No bolus 5-FU given considering previous poor tolerance.    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  Microsatellite stable, mutation burden 5/Mb.   · Discussed with the patient 10/27/2020, because of the K-[place] mutation, she is not a candidate for anti-EGFR monoclonal antibody such as Erbitux or vectibix.  She could be a candidate for anti-VEGF monoclonal antibody, however because of active wound, she is not a candidate right now at this moment.  · Patient seen in the ED for acute right neck pain on 11/16/2020.  CT angiogram as well as CT of the cervical spine performed with no acute findings but notably one of her pulmonary nodules, left upper lobe, somewhat decreased in size.  Patient given prednisone pack.  Further details below.  · Cycle #6 chemotherapy will be resumed on 1/5/2021.  Started back on original irinotecan.  · PET scan examination obtained on 1/12/2021 after cycle #6 chemotherapy reported improved pulmonary nodule hypermetabolic activity.  Confirmed these are metastatic lesions.  · Patient is evaluated 1/19/2020, will continue cycle #7 FOLFIRI chemotherapy.  However Avastin will be on hold see next section.   · Patient was reevaluated on 2/2/2021, will continue chemotherapy cycle #8 FOLFIRI.  Avastin / biosimilar will be added.    · [facility] in mid February 2021, and had recommended palliative chemotherapy without surgical intervention of metastatic lung lesions.   · On 3/2/2021, patient will proceed with cycle #10 FOLFIRI plus bevacizumab.  After 12 cycles, plan to start maintenance treatment.  · 3/16/2021 cycle  11.  Plus bevacizumab.  · 3/30/2021 cycle 12 FOLFIRI plus bevacizumab.  Having issues with worsening nausea despite premeds with dexamethasone, Aloxi, Emend, and Zofran at home.  Patient is requesting a dose of Phenergan, which is helped her in the past.  We will give this to her with treatment today.  · PET scan examination on 4/6/2021 reported no evidence of hypermetabolic metastatic lesion.  · Discussed with the patient on 4/13/2021, we reviewed the PET scan results.  We recommended to switch to maintenance chemotherapy without irinotecan.  We will continue 5-FU and Avastin every 2 weeks.  We discussed possibility of switching to oral Xeloda treatment.  Discussed with the patient for side effects more so with hand-foot syndrome.  Patient is agreeable.   · Xeloda 2000 mg twice daily 7 days on, 7 days off along with Avastin initiated 4/27/2021.  · After only 5 doses of Xeloda significant hand-foot syndrome, Xeloda held. Patient requesting to be transitioned back to infusional 5-FU, as she felt that she tolerated infusional 5-FU without any problem.  The patient will receive 5-FU leucovorin and Avastin every 2 weeks.  Xeloda discontinued.  · 5/25/2021 continued cycle #4 maintenance 5-FU, leucovorin, Avastin tolerating this much better so far, we will continue this. Recommended to have 12 cycles of maintenance chemotherapy, then obtain PET scan for reevaluation.  We could discuss further treatment plan at that time.  · 9/14/2021 patient due for cycle 12 of additional maintenance 5-FU/leucovorin plus Avastin.  She continues to tolerate treatment well overall.  She is anxious in the office today with her Mediport not getting blood at first and also with upcoming scans being heavy on her mind.  She was instructed to take one of her Klonopin pills while here to help calm her mind.  Heart rate did improve from 140s down to 112.  Proceed with treatment today as scheduled.  · PET scan examination on 9/24/2021 reported  further shrinking of the right upper lobe tiny pulmonary nodule.  No other new lesions.  · Discussed with patient on 9/24/2021 about further shrinking of the right upper lobe pulmonary nodule and no evidence for other new lesions. We recommended to have chemo break for 3 months with repeated CT scan for reevaluation.  · Recent CT scan for chest 12/14/2021 and abdomen CT from 11/29/2021 reported no evidence for active disease. The right upper lobe pulmonary nodule, left lower lobe pulmonary nodule minimal about 4 mm and stable. I discussed with patient today on 12/21/2021, recommended to give her another 3 months chemotherapy holiday and obtain CT scan afterwards for reevaluation. After discussion, patient is agreeable.   · CT scan examination for chest abdomen and pelvis obtained on 3/15/2022 reported a slight increase of the left upper lobe pulmonary nodule 5 mm, from previous 3 mm.  The other pulmonary nodules were stable in size.  There is also small left upper lobe groundglass changes.   · I reviewed images studies with the patient today on 3/23/2022, compared to multiple previous images including CT chest CT and PET scan, suspected disease progression.  Discussed with the patient, and I recommended to resume maintenance chemotherapy with 5-FU plus leucovorin plus Avastin repeating every 2 weeks, and obtaining CT scan for reassessment in 3 months.  Patient is agreeable.      2.   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  Low-dose Lovenox discontinued and initiated Xarelto 20 mg daily.  · Patient with apparent understanding chronic thrombosis of  left innominate vein likely associated with MediPort, was evident per vascular surgery from CT scan in September 2020.  · Patient held Xarelto for 2 days prior to MediPort removal from the left chest wall and placement of new port in the right chest wall on 12/18/2020.  She resumed Xarelto that evening.  · Progressive swelling in the neck, face, upper extremities prompting hospitalization with CT scan showing thrombosis in the right subclavian vein and SVC.  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  Although she had been off of Xarelto for a few days, clearly Xarelto was not prohibiting progression of her thrombosis after she resumed treatment and there was some evidence to suggest thrombosis had been present at least in the innominate vein on prior scan in September but now appears chronic.  Current acute thrombosis involving SVC and right subclavian.  · Patient was admitted and placed on heparin drip  · Patient was evaluated by vascular surgery and intervention was not recommended for thrombolysis/thrombectomy.  · On 12/22/2020, the patient developed worsening shortness of breath, increasing upper extremity edema consistent with worsening SVC syndrome.  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  · Symptoms improved and patient was discharged 12/24/2020 on Lovenox 100 mg every 12 hours.    · Returns 12/29/2020 for evaluation continuing Lovenox, overall tolerating it well.  No bleeding issues.  · Improved edema 1/5/2021.  Will continue Lovenox weight-based every 12 hours.  · On 1/19/2021 and 2/2/2021, patient reports improved facial swelling.  She however does have being bruise on her abdomen wall where she does Lovenox injection.  However she does not have a spontaneous epistaxis, gum bleeding or other bleeding.    · On 2/2/2021, we discussed that side effects of Avastin/biosimilar related to thrombosis.  Since this patient already has been on Lovenox, I think the benefits from treatment for her cancer will outweigh that risk of thrombosis, will proceed ahead with Avastin.  I cautioned patient to watch out for signs of worsening thrombosis patient voiced understanding.   · On 3/16/2021, no evidence of worsening DVT, continue Lovenox.  · 5/11/2021 Lovenox dosing will be adjusted due to patient's weight loss.  We discussed she needs 1 mg/kg.  Her syringes are 100 mg syringes she will do 0.9 mL subcu every 12 hours.  · 8/3/2021 Patient continues to have bruising on abdomen from lovenox injections.  Will need to monitor weight and adjust dosing in future if further weight loss.   · Stable condition on 9/28/2021.  Continue Lovenox anticoagulation.    · Patient reports no chest pain no dyspnea no leg edema. However she had bruising and also most recently abnormal small abscess for which she actually went to ER on 11/29/2021, had I&D. The wound is healing. No further drainage. Denies fever sweating or chills. After discussion, she will continue Lovenox injection for now.   · On 3/23/2022, patient reports good tolerance of Lovenox.  Nevertheless she still has small quantity of pus in the left lower abdomen abscess, she squeezes it every day to prevent it became worse.  She reports no fever sweating chills.  I recommended to start Augmentin 875 mg twice a day for 7 days.  She will continue Lovenox indefinitely.    3.  This patient also has strong family history for malignancy the patient had appointment with genetic counseling on September 3, 2019.  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    4.  Mild anemia, improved since her surgery.    · She also has a history of iron deficiency.  Iron studies reveal iron saturation of 10%.  She was started on oral iron but was unable to tolerate due to GI side effects.   · Status post  Injectafer 2/5/2020 and 2/12/2020   · Improved hemoglobin 13.5 on 1/5/2021.  · 3/16/2020 when hemoglobin now up to 16.2, hematocrit 47.6.  Patient admits that she has started smoking again, and I have encouraged her to quit.  She denies any snoring or sleep apnea diagnosis.  Patient is not taking oral iron.  We will closely monitor.  · 3/30/2020 hemoglobin 15.7, HCT 47.5.  Patient states that she has cut back significantly on her smoking, although not quit yet.  I have encouraged her to continue working on this.  We will continue to closely monitor.  · Hemoglobin 14.6 on 6/8/2021 at the meantime he has worsening macrocytosis .6.    · Laboratory studies 6/22/2021 reported excellent folate > 20 ng/mL, however low normal B12 level 357 pg/mL.    · On 7/6/2021, normal hemoglobin 15.8, .9 and HCT 47.2%.  Patient was asked to start oral vitamin B12 at 1000 mcg daily.   · 9/14/2021 hemoglobin 17.0, hematocrit 52.1.  Monitor.  · On 9/28/2021 hemoglobin 16.1 hematocrit 48.5%, continue to monitor.   · Lab study on 12/14/2021 reported excellent ferritin 296 and iron saturation 25% with free iron 104 TIBC 424. Hemoglobin was 15.2 with .2.   · Normal hemoglobin 14.6 on 3/15/2022.  Iron study reported normal ferritin 129.5 ng/mL however slightly low iron saturation 11%.  Continue to monitor for now.    5.  Peripheral neuropathy secondary to chemotherapy.   · This is mild involving bilateral hands and feet as reported on 9/16/2020.   · Will avoid chemotherapy with oxaliplatin.  · Stable mild neuropathy as of 11/10/2020  · Patient reports worsening peripheral neuropathy and cycle #5 chemotherapy on 12/8/2020 with and without irinotecan.   · On 1/5/2021, patient reports improvement of peripheral neuropathy, will add irinotecan back to chemotherapy.  Continue to monitor and adjust medication.  · On 3/2/2021, patient reports no worsening of peripheral neuropathy after restarting irinotecan.   · This remains stable,  may be slightly better as reported on 3/23/2022..     6.  History of hand-foot syndrome grade 3.    · It become so significant after cycle #7 FOLFOX treatment.  Discussed with patient on 5/13/2020, will discontinue the bolus 5-FU dose, and also decrease 50% of the infusion dose through the pump.   · We also use Medrol Dosepak for possible recurrent symptomology.  She responded this well.   · Her hand-foot syndrome improved.  · Under current treatment with FOLFIRI, patient not receiving 5-FU bolus.  No recurrent issues.   · Patient will be switched to maintenance chemotherapy using Xeloda in late April 2021.  · Following 5 doses of Xeloda, significant hand-foot syndrome with pain in the feet, significant erythema.  Xeloda held.  Will be further discussed with Dr. Nguyen to determine if the patient will resume 5-FU versus a dose reduction to Xeloda.  · Aggressive emollient moisturizer use twice daily for the past week and patient reports improvement in the dryness and erythema.  Xeloda will now be discontinued and patient will switch back to 5-FU.  · As of 5/25/2021 dryness and erythema/hyperpigmentation continues to improve.  Xeloda has been discontinued.  · 6/8/2021, patient reports much improved hand-foot syndrome, with remnant pigmentation on palms.  · On 7/6/2021, patient reports and had a some flareup of hand-foot syndrome, however improved after aggressive moisturizing cream and the urea cream.  Overall much tolerated infusional 5-FU compared to Xeloda.    · 7/20/2021 continues to have mild hyperpigmentation of her hands and feet bilaterally, she continues aggressive moisturization with utter balm with urea.  She also reports some soreness of her tailbone, and we discussed that this is likely due to loss of weight and muscle mass.  I advised her to go ahead and apply moisturizer to this area as there is one tiny fissure.  Also recommended using a waffle pad or doughnut to sit on.  · 8/3/2021 patient reports her  hand foot symptoms are stable and without worsening.  Continue to monitor.  · Symptoms stable, typically flaring about 24 hours after she is unhooked from her 5-FU infusional ball and then settling down with some mild peeling.   · Since off chemotherapy no specific complaints.     7.  Hyperlacrimation and mild scleral irritation related to 5-FU.  She has been taking steroid eyedrops.  This has improved her hyperlacrimation.  · Not currently an issue.     8.  Abnormal liver function panel.  · Overall LFTs have improved which is mild ongoing elevation of AST and ALT.  Continue to monitor.    · Slightly worse AST 43 ALT 84 on 9/28/2021.  Continue to monitor.  · Slightly elevated AST 34 ALT 55 on 12/21/2021. Continue to monitor.    · Normal liver function panel on 3/15/2022.    9.  Depression.  Patient seen by JULIET Davenport on 11/9/2020.  She was started on mirtazapine.  Lexapro was discontinued.  This has definitely improved her mood with the patient feeling overall much better.  She is sleeping better.  Appetite is improved with her actually eating more than she wants to.    · Condition is stable.  She plans to continue follow-up with supportive oncology.      10.  Proteinuria: 3/30/2020: Patient is 2+ protein in her urine.  We will continue with Avastin and closely monitor.  No need for 24-hour urine at this time.  · Persistent 2+ proteinuria on 4/13/2021.  continue to monitor.  · 5/25/2021 protein urine only 1+.    · 6/22/2021 persistent 1+ proteinuria.  Continue to monitor.  · 7/6/2021, trace protein.  Continue Avastin treatment and monitoring.  · 8/3/2021 1+ protein, stable from last cycle.   · On 8/17/2021, urine protein 2+.  She also had a 1+ leukocytes.  Patient is not symptomatic such as fever, dysuria or gross hematuria, however urine culture obtained, with >100,000 E. Faecalis. Patient treated with Levaquin.   · No active problem currently.    11.  Tachycardia:   · Patient was seen by Dr. Bustos  cardiologist on 4/6/2021.   · Follow up visit 7/22/2021 with plans to repeat evaluation in 3 months  · As noted above patient more tachycardic on 9/14/2021 in the setting of anxiety.  Improved with Klonopin.    12.  Hypertension.  · /91 at visit 8/17/2021.  She was given clonidine for treatment that day and also prescribed lisinopril 10 mg daily.    · Patient unable to tolerate lisinopril, noting that her blood pressure bottomed out with systolic of barely 80 and feeling very fatigued and wiped out.  She has been checking her blood pressure off the lisinopril with systolics in the 120s to 130s at home and diastolic in the 80s to 90s.   · Blood pressure remained stable with systolic in the 120s-130s.   · On 12/21/2021 /84 with a pulse 98. She will remain off antihypertensives for now.    · On 3/23/2022 /89.  Continue to monitor.      PLAN:  1. We will obtain insurance approval, and to resume maintenance chemotherapy with 5-FU plus leucovorin plus Avastin, to be started next week with routine labs..   2. Start Augmentin 875 mg twice a day for 7 days.  I E scribed to her pharmacy today.  3. Continue Lovenox 1 mg/kg twice daily.  4. Continue oral B12 at 1000 mcg daily.  5. Patient will come to see me in 3 weeks for reassessment, and to start cycle #2 maintenance chemotherapy.     I personally reviewed the CT scan images from 3/15/2022, and compared to CT scan from 12/14/2021, 11/29/2021, and I compared to PET scan images from 9/24/2021 and the PET scan from 1/12/2021. I shared images with the patient today.  We discussed management plan and long-term outlook.  Questions were answered to patient satisfaction.        Dong Nguyen MD PhD  03/23/22      CC:  Deepika Vela III NP-C   MD Perla Bowers MD

## 2022-03-24 RX ORDER — SODIUM CHLORIDE 9 MG/ML
250 INJECTION, SOLUTION INTRAVENOUS ONCE
Status: CANCELLED | OUTPATIENT
Start: 2022-03-29

## 2022-03-24 RX ORDER — PALONOSETRON 0.05 MG/ML
0.25 INJECTION, SOLUTION INTRAVENOUS ONCE
Status: CANCELLED | OUTPATIENT
Start: 2022-03-29

## 2022-03-29 ENCOUNTER — INFUSION (OUTPATIENT)
Dept: ONCOLOGY | Facility: HOSPITAL | Age: 42
End: 2022-03-29

## 2022-03-29 VITALS
BODY MASS INDEX: 35.28 KG/M2 | SYSTOLIC BLOOD PRESSURE: 132 MMHG | RESPIRATION RATE: 16 BRPM | OXYGEN SATURATION: 96 % | HEART RATE: 104 BPM | TEMPERATURE: 97.7 F | WEIGHT: 212 LBS | DIASTOLIC BLOOD PRESSURE: 85 MMHG

## 2022-03-29 DIAGNOSIS — C20 ADENOCARCINOMA OF RECTUM: Primary | ICD-10-CM

## 2022-03-29 LAB
ALBUMIN SERPL-MCNC: 3.6 G/DL (ref 3.5–5.2)
ALBUMIN/GLOB SERPL: 1.1 G/DL (ref 1.1–2.4)
ALP SERPL-CCNC: 91 U/L (ref 38–116)
ALT SERPL W P-5'-P-CCNC: 30 U/L (ref 0–33)
ANION GAP SERPL CALCULATED.3IONS-SCNC: 7 MMOL/L (ref 5–15)
AST SERPL-CCNC: 22 U/L (ref 0–32)
BASOPHILS # BLD AUTO: 0.02 10*3/MM3 (ref 0–0.2)
BASOPHILS NFR BLD AUTO: 0.3 % (ref 0–1.5)
BILIRUB SERPL-MCNC: <0.2 MG/DL (ref 0.2–1.2)
BILIRUB UR QL STRIP: NEGATIVE
BUN SERPL-MCNC: 12 MG/DL (ref 6–20)
BUN/CREAT SERPL: 16.9 (ref 7.3–30)
CALCIUM SPEC-SCNC: 10.2 MG/DL (ref 8.5–10.2)
CHLORIDE SERPL-SCNC: 107 MMOL/L (ref 98–107)
CLARITY UR: CLEAR
CO2 SERPL-SCNC: 25 MMOL/L (ref 22–29)
COLOR UR: YELLOW
CREAT SERPL-MCNC: 0.71 MG/DL (ref 0.6–1.1)
DEPRECATED RDW RBC AUTO: 48 FL (ref 37–54)
EGFRCR SERPLBLD CKD-EPI 2021: 109 ML/MIN/1.73
EOSINOPHIL # BLD AUTO: 0.18 10*3/MM3 (ref 0–0.4)
EOSINOPHIL NFR BLD AUTO: 2.9 % (ref 0.3–6.2)
ERYTHROCYTE [DISTWIDTH] IN BLOOD BY AUTOMATED COUNT: 13.4 % (ref 12.3–15.4)
GLOBULIN UR ELPH-MCNC: 3.3 GM/DL (ref 1.8–3.5)
GLUCOSE SERPL-MCNC: 118 MG/DL (ref 74–124)
GLUCOSE UR STRIP-MCNC: NEGATIVE MG/DL
HCT VFR BLD AUTO: 45.5 % (ref 34–46.6)
HGB BLD-MCNC: 14.7 G/DL (ref 12–15.9)
HGB UR QL STRIP.AUTO: ABNORMAL
IMM GRANULOCYTES # BLD AUTO: 0.02 10*3/MM3 (ref 0–0.05)
IMM GRANULOCYTES NFR BLD AUTO: 0.3 % (ref 0–0.5)
KETONES UR QL STRIP: NEGATIVE
LEUKOCYTE ESTERASE UR QL STRIP.AUTO: NEGATIVE
LYMPHOCYTES # BLD AUTO: 0.89 10*3/MM3 (ref 0.7–3.1)
LYMPHOCYTES NFR BLD AUTO: 14.3 % (ref 19.6–45.3)
MCH RBC QN AUTO: 31.3 PG (ref 26.6–33)
MCHC RBC AUTO-ENTMCNC: 32.3 G/DL (ref 31.5–35.7)
MCV RBC AUTO: 96.8 FL (ref 79–97)
MONOCYTES # BLD AUTO: 0.38 10*3/MM3 (ref 0.1–0.9)
MONOCYTES NFR BLD AUTO: 6.1 % (ref 5–12)
NEUTROPHILS NFR BLD AUTO: 4.72 10*3/MM3 (ref 1.7–7)
NEUTROPHILS NFR BLD AUTO: 76.1 % (ref 42.7–76)
NITRITE UR QL STRIP: NEGATIVE
NRBC BLD AUTO-RTO: 0 /100 WBC (ref 0–0.2)
PH UR STRIP.AUTO: 5.5 [PH] (ref 4.5–8)
PLATELET # BLD AUTO: 228 10*3/MM3 (ref 140–450)
PMV BLD AUTO: 8.5 FL (ref 6–12)
POTASSIUM SERPL-SCNC: 4.2 MMOL/L (ref 3.5–4.7)
PROT SERPL-MCNC: 6.9 G/DL (ref 6.3–8)
PROT UR QL STRIP: NEGATIVE
RBC # BLD AUTO: 4.7 10*6/MM3 (ref 3.77–5.28)
SODIUM SERPL-SCNC: 139 MMOL/L (ref 134–145)
SP GR UR STRIP: >=1.03 (ref 1–1.03)
UROBILINOGEN UR QL STRIP: ABNORMAL
WBC NRBC COR # BLD: 6.21 10*3/MM3 (ref 3.4–10.8)

## 2022-03-29 PROCEDURE — 25010000002 BEVACIZUMAB PER 10 MG: Performed by: INTERNAL MEDICINE

## 2022-03-29 PROCEDURE — 25010000002 LEUCOVORIN 500 MG RECONSTITUTED SOLUTION 1 EACH VIAL: Performed by: INTERNAL MEDICINE

## 2022-03-29 PROCEDURE — 96375 TX/PRO/DX INJ NEW DRUG ADDON: CPT

## 2022-03-29 PROCEDURE — 96413 CHEMO IV INFUSION 1 HR: CPT

## 2022-03-29 PROCEDURE — 96367 TX/PROPH/DG ADDL SEQ IV INF: CPT

## 2022-03-29 PROCEDURE — 25010000002 FOSAPREPITANT PER 1 MG: Performed by: INTERNAL MEDICINE

## 2022-03-29 PROCEDURE — 25010000002 FLUOROURACIL PER 500 MG: Performed by: INTERNAL MEDICINE

## 2022-03-29 PROCEDURE — 25010000002 PALONOSETRON PER 25 MCG: Performed by: INTERNAL MEDICINE

## 2022-03-29 PROCEDURE — 81003 URINALYSIS AUTO W/O SCOPE: CPT

## 2022-03-29 PROCEDURE — 80053 COMPREHEN METABOLIC PANEL: CPT

## 2022-03-29 PROCEDURE — 96416 CHEMO PROLONG INFUSE W/PUMP: CPT

## 2022-03-29 PROCEDURE — 25010000002 DEXAMETHASONE SODIUM PHOSPHATE 100 MG/10ML SOLUTION: Performed by: INTERNAL MEDICINE

## 2022-03-29 PROCEDURE — 85025 COMPLETE CBC W/AUTO DIFF WBC: CPT

## 2022-03-29 PROCEDURE — 25010000002 BEVACIZUMAB 100 MG/4ML SOLUTION 4 ML VIAL: Performed by: INTERNAL MEDICINE

## 2022-03-29 RX ORDER — SODIUM CHLORIDE 9 MG/ML
250 INJECTION, SOLUTION INTRAVENOUS ONCE
Status: COMPLETED | OUTPATIENT
Start: 2022-03-29 | End: 2022-03-29

## 2022-03-29 RX ORDER — PALONOSETRON 0.05 MG/ML
0.25 INJECTION, SOLUTION INTRAVENOUS ONCE
Status: COMPLETED | OUTPATIENT
Start: 2022-03-29 | End: 2022-03-29

## 2022-03-29 RX ADMIN — SODIUM CHLORIDE 150 ML: 900 INJECTION, SOLUTION INTRAVENOUS at 09:05

## 2022-03-29 RX ADMIN — PALONOSETRON HYDROCHLORIDE 0.25 MG: 0.25 INJECTION, SOLUTION INTRAVENOUS at 08:43

## 2022-03-29 RX ADMIN — FLUOROURACIL 4870 MG: 50 INJECTION, SOLUTION INTRAVENOUS at 10:51

## 2022-03-29 RX ADMIN — SODIUM CHLORIDE 250 ML: 9 INJECTION, SOLUTION INTRAVENOUS at 08:42

## 2022-03-29 RX ADMIN — DEXAMETHASONE SODIUM PHOSPHATE 12 MG: 10 INJECTION, SOLUTION INTRAMUSCULAR; INTRAVENOUS at 08:44

## 2022-03-29 RX ADMIN — LEUCOVORIN CALCIUM 810 MG: 500 INJECTION, POWDER, LYOPHILIZED, FOR SOLUTION INTRAMUSCULAR; INTRAVENOUS at 10:13

## 2022-03-29 RX ADMIN — BEVACIZUMAB 900 MG: 400 INJECTION, SOLUTION INTRAVENOUS at 09:41

## 2022-03-30 ENCOUNTER — TELEPHONE (OUTPATIENT)
Dept: ONCOLOGY | Facility: CLINIC | Age: 42
End: 2022-03-30

## 2022-03-30 ENCOUNTER — INFUSION (OUTPATIENT)
Dept: ONCOLOGY | Facility: HOSPITAL | Age: 42
End: 2022-03-30

## 2022-03-30 NOTE — NURSING NOTE
Pt presents this morning after discovering her chemo ball to be leaking when she arrived at work, she states this happened about 815 and she immediatly clamped her chemo line. Pt reports the equashield having come off at the mediport side, she pushed the end back into the equashield herself.   mediport site C/D/I + BR noted.  Equashields removed from both port site and chemo ball, ball reconnected to pt and she was discharged.  Pt aware of return appointment Thursday at 9am.

## 2022-03-30 NOTE — TELEPHONE ENCOUNTER
Patient called stating her 5FU ball is leaking. Patient states she has reconnected it. Ask patient to come to the office as soon as possible so Infusion RNs can check her connection.  Patient v/u

## 2022-03-31 ENCOUNTER — APPOINTMENT (OUTPATIENT)
Dept: ONCOLOGY | Facility: HOSPITAL | Age: 42
End: 2022-03-31

## 2022-03-31 ENCOUNTER — INFUSION (OUTPATIENT)
Dept: ONCOLOGY | Facility: HOSPITAL | Age: 42
End: 2022-03-31

## 2022-03-31 DIAGNOSIS — Z45.2 ENCOUNTER FOR FITTING AND ADJUSTMENT OF VASCULAR CATHETER: ICD-10-CM

## 2022-03-31 DIAGNOSIS — C20 ADENOCARCINOMA OF RECTUM: Primary | ICD-10-CM

## 2022-03-31 PROCEDURE — 25010000002 HEPARIN LOCK FLUSH PER 10 UNITS: Performed by: INTERNAL MEDICINE

## 2022-03-31 RX ORDER — HEPARIN SODIUM (PORCINE) LOCK FLUSH IV SOLN 100 UNIT/ML 100 UNIT/ML
500 SOLUTION INTRAVENOUS AS NEEDED
Status: CANCELLED | OUTPATIENT
Start: 2022-03-31

## 2022-03-31 RX ORDER — HEPARIN SODIUM (PORCINE) LOCK FLUSH IV SOLN 100 UNIT/ML 100 UNIT/ML
500 SOLUTION INTRAVENOUS AS NEEDED
Status: DISCONTINUED | OUTPATIENT
Start: 2022-03-31 | End: 2022-03-31 | Stop reason: HOSPADM

## 2022-03-31 RX ORDER — DICYCLOMINE HCL 20 MG
TABLET ORAL
Qty: 360 TABLET | Refills: 0 | Status: SHIPPED | OUTPATIENT
Start: 2022-03-31 | End: 2023-04-05

## 2022-03-31 RX ORDER — SODIUM CHLORIDE 0.9 % (FLUSH) 0.9 %
10 SYRINGE (ML) INJECTION AS NEEDED
Status: CANCELLED | OUTPATIENT
Start: 2022-03-31

## 2022-03-31 RX ORDER — SODIUM CHLORIDE 0.9 % (FLUSH) 0.9 %
10 SYRINGE (ML) INJECTION AS NEEDED
Status: DISCONTINUED | OUTPATIENT
Start: 2022-03-31 | End: 2022-03-31 | Stop reason: HOSPADM

## 2022-03-31 RX ADMIN — Medication 500 UNITS: at 08:44

## 2022-03-31 RX ADMIN — Medication 10 ML: at 08:44

## 2022-04-12 ENCOUNTER — INFUSION (OUTPATIENT)
Dept: ONCOLOGY | Facility: HOSPITAL | Age: 42
End: 2022-04-12

## 2022-04-12 ENCOUNTER — OFFICE VISIT (OUTPATIENT)
Dept: ONCOLOGY | Facility: CLINIC | Age: 42
End: 2022-04-12

## 2022-04-12 VITALS
RESPIRATION RATE: 18 BRPM | OXYGEN SATURATION: 95 % | TEMPERATURE: 97.1 F | HEIGHT: 65 IN | HEART RATE: 92 BPM | WEIGHT: 211.4 LBS | SYSTOLIC BLOOD PRESSURE: 118 MMHG | DIASTOLIC BLOOD PRESSURE: 53 MMHG | BODY MASS INDEX: 35.22 KG/M2

## 2022-04-12 DIAGNOSIS — D68.51 HETEROZYGOUS FACTOR V LEIDEN MUTATION: Chronic | ICD-10-CM

## 2022-04-12 DIAGNOSIS — C78.02 MALIGNANT NEOPLASM METASTATIC TO BOTH LUNGS: ICD-10-CM

## 2022-04-12 DIAGNOSIS — I26.99 OTHER PULMONARY EMBOLISM WITHOUT ACUTE COR PULMONALE, UNSPECIFIED CHRONICITY: ICD-10-CM

## 2022-04-12 DIAGNOSIS — G62.0 DRUG-INDUCED PERIPHERAL NEUROPATHY: ICD-10-CM

## 2022-04-12 DIAGNOSIS — C20 ADENOCARCINOMA OF RECTUM: Primary | ICD-10-CM

## 2022-04-12 DIAGNOSIS — Z79.01 ON ANTICOAGULANT THERAPY: ICD-10-CM

## 2022-04-12 DIAGNOSIS — C20 ADENOCARCINOMA OF RECTUM: ICD-10-CM

## 2022-04-12 DIAGNOSIS — C78.01 MALIGNANT NEOPLASM METASTATIC TO BOTH LUNGS: ICD-10-CM

## 2022-04-12 LAB
ALBUMIN SERPL-MCNC: 3.9 G/DL (ref 3.5–5.2)
ALBUMIN/GLOB SERPL: 1.3 G/DL (ref 1.1–2.4)
ALP SERPL-CCNC: 93 U/L (ref 38–116)
ALT SERPL W P-5'-P-CCNC: 24 U/L (ref 0–33)
ANION GAP SERPL CALCULATED.3IONS-SCNC: 8.4 MMOL/L (ref 5–15)
AST SERPL-CCNC: 14 U/L (ref 0–32)
BASOPHILS # BLD AUTO: 0.03 10*3/MM3 (ref 0–0.2)
BASOPHILS NFR BLD AUTO: 0.4 % (ref 0–1.5)
BILIRUB SERPL-MCNC: 0.2 MG/DL (ref 0.2–1.2)
BILIRUB UR QL STRIP: NEGATIVE
BUN SERPL-MCNC: 12 MG/DL (ref 6–20)
BUN/CREAT SERPL: 14.8 (ref 7.3–30)
CALCIUM SPEC-SCNC: 9.6 MG/DL (ref 8.5–10.2)
CHLORIDE SERPL-SCNC: 105 MMOL/L (ref 98–107)
CLARITY UR: ABNORMAL
CO2 SERPL-SCNC: 25.6 MMOL/L (ref 22–29)
COLOR UR: YELLOW
CREAT SERPL-MCNC: 0.81 MG/DL (ref 0.6–1.1)
DEPRECATED RDW RBC AUTO: 48.5 FL (ref 37–54)
EGFRCR SERPLBLD CKD-EPI 2021: 93.1 ML/MIN/1.73
EOSINOPHIL # BLD AUTO: 0.17 10*3/MM3 (ref 0–0.4)
EOSINOPHIL NFR BLD AUTO: 2.5 % (ref 0.3–6.2)
ERYTHROCYTE [DISTWIDTH] IN BLOOD BY AUTOMATED COUNT: 13.8 % (ref 12.3–15.4)
GLOBULIN UR ELPH-MCNC: 3.1 GM/DL (ref 1.8–3.5)
GLUCOSE SERPL-MCNC: 109 MG/DL (ref 74–124)
GLUCOSE UR STRIP-MCNC: NEGATIVE MG/DL
HCT VFR BLD AUTO: 48.1 % (ref 34–46.6)
HGB BLD-MCNC: 15.6 G/DL (ref 12–15.9)
HGB UR QL STRIP.AUTO: NEGATIVE
IMM GRANULOCYTES # BLD AUTO: 0.02 10*3/MM3 (ref 0–0.05)
IMM GRANULOCYTES NFR BLD AUTO: 0.3 % (ref 0–0.5)
KETONES UR QL STRIP: NEGATIVE
LEUKOCYTE ESTERASE UR QL STRIP.AUTO: ABNORMAL
LYMPHOCYTES # BLD AUTO: 0.88 10*3/MM3 (ref 0.7–3.1)
LYMPHOCYTES NFR BLD AUTO: 13 % (ref 19.6–45.3)
MCH RBC QN AUTO: 31.5 PG (ref 26.6–33)
MCHC RBC AUTO-ENTMCNC: 32.4 G/DL (ref 31.5–35.7)
MCV RBC AUTO: 97.2 FL (ref 79–97)
MONOCYTES # BLD AUTO: 0.46 10*3/MM3 (ref 0.1–0.9)
MONOCYTES NFR BLD AUTO: 6.8 % (ref 5–12)
NEUTROPHILS NFR BLD AUTO: 5.19 10*3/MM3 (ref 1.7–7)
NEUTROPHILS NFR BLD AUTO: 77 % (ref 42.7–76)
NITRITE UR QL STRIP: NEGATIVE
NRBC BLD AUTO-RTO: 0 /100 WBC (ref 0–0.2)
PH UR STRIP.AUTO: 5.5 [PH] (ref 4.5–8)
PLATELET # BLD AUTO: 188 10*3/MM3 (ref 140–450)
PMV BLD AUTO: 8.6 FL (ref 6–12)
POTASSIUM SERPL-SCNC: 4.4 MMOL/L (ref 3.5–4.7)
PROT SERPL-MCNC: 7 G/DL (ref 6.3–8)
PROT UR QL STRIP: NEGATIVE
RBC # BLD AUTO: 4.95 10*6/MM3 (ref 3.77–5.28)
SODIUM SERPL-SCNC: 139 MMOL/L (ref 134–145)
SP GR UR STRIP: >=1.03 (ref 1–1.03)
UROBILINOGEN UR QL STRIP: ABNORMAL
WBC NRBC COR # BLD: 6.75 10*3/MM3 (ref 3.4–10.8)

## 2022-04-12 PROCEDURE — 25010000002 FLUOROURACIL PER 500 MG: Performed by: INTERNAL MEDICINE

## 2022-04-12 PROCEDURE — 99214 OFFICE O/P EST MOD 30 MIN: CPT | Performed by: INTERNAL MEDICINE

## 2022-04-12 PROCEDURE — 96375 TX/PRO/DX INJ NEW DRUG ADDON: CPT

## 2022-04-12 PROCEDURE — 96416 CHEMO PROLONG INFUSE W/PUMP: CPT

## 2022-04-12 PROCEDURE — 85025 COMPLETE CBC W/AUTO DIFF WBC: CPT

## 2022-04-12 PROCEDURE — 80053 COMPREHEN METABOLIC PANEL: CPT

## 2022-04-12 PROCEDURE — 25010000002 PALONOSETRON PER 25 MCG: Performed by: INTERNAL MEDICINE

## 2022-04-12 PROCEDURE — 25010000002 BEVACIZUMAB 100 MG/4ML SOLUTION 4 ML VIAL: Performed by: INTERNAL MEDICINE

## 2022-04-12 PROCEDURE — 96368 THER/DIAG CONCURRENT INF: CPT

## 2022-04-12 PROCEDURE — 96413 CHEMO IV INFUSION 1 HR: CPT

## 2022-04-12 PROCEDURE — 25010000002 FOSAPREPITANT PER 1 MG: Performed by: INTERNAL MEDICINE

## 2022-04-12 PROCEDURE — 81003 URINALYSIS AUTO W/O SCOPE: CPT

## 2022-04-12 PROCEDURE — 25010000002 LEUCOVORIN 500 MG RECONSTITUTED SOLUTION 1 EACH VIAL: Performed by: INTERNAL MEDICINE

## 2022-04-12 PROCEDURE — 25010000002 BEVACIZUMAB PER 10 MG: Performed by: INTERNAL MEDICINE

## 2022-04-12 PROCEDURE — 25010000002 DEXAMETHASONE SODIUM PHOSPHATE 100 MG/10ML SOLUTION: Performed by: INTERNAL MEDICINE

## 2022-04-12 PROCEDURE — 96367 TX/PROPH/DG ADDL SEQ IV INF: CPT

## 2022-04-12 RX ORDER — PALONOSETRON 0.05 MG/ML
0.25 INJECTION, SOLUTION INTRAVENOUS ONCE
Status: CANCELLED | OUTPATIENT
Start: 2022-04-12

## 2022-04-12 RX ORDER — SODIUM CHLORIDE 9 MG/ML
250 INJECTION, SOLUTION INTRAVENOUS ONCE
Status: COMPLETED | OUTPATIENT
Start: 2022-04-12 | End: 2022-04-12

## 2022-04-12 RX ORDER — PALONOSETRON 0.05 MG/ML
0.25 INJECTION, SOLUTION INTRAVENOUS ONCE
Status: COMPLETED | OUTPATIENT
Start: 2022-04-12 | End: 2022-04-12

## 2022-04-12 RX ORDER — SODIUM CHLORIDE 9 MG/ML
250 INJECTION, SOLUTION INTRAVENOUS ONCE
Status: CANCELLED | OUTPATIENT
Start: 2022-04-12

## 2022-04-12 RX ADMIN — SODIUM CHLORIDE 150 ML: 900 INJECTION, SOLUTION INTRAVENOUS at 09:45

## 2022-04-12 RX ADMIN — LEUCOVORIN CALCIUM 810 MG: 500 INJECTION, POWDER, LYOPHILIZED, FOR SOLUTION INTRAMUSCULAR; INTRAVENOUS at 11:29

## 2022-04-12 RX ADMIN — FLUOROURACIL 4870 MG: 50 INJECTION, SOLUTION INTRAVENOUS at 12:05

## 2022-04-12 RX ADMIN — DEXAMETHASONE SODIUM PHOSPHATE 12 MG: 10 INJECTION, SOLUTION INTRAMUSCULAR; INTRAVENOUS at 10:58

## 2022-04-12 RX ADMIN — PALONOSETRON HYDROCHLORIDE 0.25 MG: 0.25 INJECTION INTRAVENOUS at 09:44

## 2022-04-12 RX ADMIN — BEVACIZUMAB 900 MG: 400 INJECTION, SOLUTION INTRAVENOUS at 10:23

## 2022-04-12 RX ADMIN — SODIUM CHLORIDE 250 ML: 9 INJECTION, SOLUTION INTRAVENOUS at 09:30

## 2022-04-12 NOTE — NURSING NOTE
Pt connected to 5FU chemo ball at 1205. Both clamps unclamped. Pt instructed to come for chemo ball unhook at 1000 on 4/14/22. Pt v/u. Message sent to  scheduling pool regarding unhook appt.

## 2022-04-12 NOTE — PROGRESS NOTES
REASON FOR FOLLOW UP:     1. Rectal cancer, rectal ultrasound examination in July 2019 reported T3N0 disease without lymphadenopathy. She does have small pulmonary nodule 6-7 mm and 2 mm with indeterminate feature. There is no solid evidence of metastatic disease otherwise. Patient has stage IIA (T3N0M0) disease.  2. The patient is heterozygous factor V Leiden, was on prophylactic anticoagulation with Lovenox 40 mg daily given her increased risk of thrombosis with MediPort and GI malignancy.   3. PET scan on 8/8/2019 reported an 8 mm hypermetabolic right upper lobe pulmonary nodule, which is suspicious for metastatic as well.    4. Patient had a PowerPort placement on the left upper chest by Dr. Joseph on 8/9/2019.  5. Patient was started on concurrent infusional 5-FU chemoradiation therapy on 8/12/2019, with planned complete surgical resection and further adjuvant chemotherapy with intention to cure the disease.   6. Patient underwent surgical resection of the primary rectal cancer by Dr. Ye on 12/2/2019.  Pathology evaluation reported residual T3N1 disease stage IIIb.  7. Diarrhea related to therapy and radiation.   8. Patient was started cycle 1 day 1 of adjuvant FOLFOX 6 on 1/23/2020.  9. On 2/5/2020 FOLFOX 6 cycle 2 delayed secondary to neutropenia.  Patient was given 3 days of Granix injection.  After cycle #2 we planned 3 days of Granix with each cycle.   10. Patient also intolerant of oral iron.  Patient received 2 doses of IV injectafer on 02/05/2020 and 02/12/2020.   11. 02/12/2020 Proceed with cycle #2 of FOLFOX 6 with 3 days of Granix.  12. On 3/11/2020 cycle 4 postponed secondary to abdominal pain and occasional low-grade fevers.  CT scans ordered.  13. Cycle #4 resumed after CT scan revealed no evidence of disease.  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 Ye. patient has no fever.  Continue to observe.   14. Grade 3  hand-foot syndrome secondary to 5-FU after cycle #7 FOLFOX 6 chemotherapy, prompting ER visit.  Also has worsening peripheral neuropathy.   15. Cycle #8 FOLFOX 6 was given on 5/13/2020, with 50% dose reduction for both 5-FU and oxaliplatin, and also examination of bolus 5-FU.   16. PET scan examination on 5/21/2020 reported no evidence of metastatic disease in the chest abdomen pelvis.  Stable 6 mm RUL pulmonary nodule.  17. On 5/27/2020, I discussed with the patient that we can discontinue chemotherapy at this time.   18. Patient had a surgical procedure for low anterior colon resection, coloanal anastomosis on 8/3/2020.  19. CT scan for chest abdomen pelvis on 9/9/2020 reported a new 8 mm noncalcified pulmonary nodule in the left lower lobe of the lung.  Otherwise stable right upper lobe 6 mm pulmonary nodule, and no evidence of disease recurrence in the abdomen or pelvis.  20. PET scan examination on 9/18/2020 reported multiple hypermetabolic small pulmonary nodules/ new pulmonary nodules.   21. Patient was started on pelvic chemotherapy with FOLFIRI regimen on 10/13/2020.   22. 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 It reported tumor mutation burden 5 Muts/Mb, microsatellite stable, TP53 mutation R282W, and others.   23. Cycle #5 was without irinotecan, due to peripheral neuropathy.  24. 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  Patient was switched to weight-based Lovenox injection.  25. Cycle #6 5-FU and irinotecan was delayed by 2 weeks because of the above incident.  26. Patient had a telemedicine evaluation at that the [facility] [place] in February 2021.  They agreed with our treatment plan for palliative chemotherapy followed by maintenance chemotherapy.    27. PET scan examination on 4/6/2021 after cycle #12 palliative chemotherapy reported no  evidence of hypermetabolic metastatic lesion.   28. Patient was started on maintenance chemotherapy with 5-FU and Avastin on 4/13/2021. (Unable to tolerate Xeloda because of a significant hand-foot syndrome).   29. PET scan on 9/24/2021 obtained after cycle #12 maintenance chemotherapy with 5-FU, leucovorin, Avastin reported no evidence for active disease. We recommended 3 months chemotherapy holiday.  30. CT scan examination on 3/15/2022 reported slightly disease progression with increase in size of small pulmonary nodule.  We started patient back on maintenance chemotherapy on 3/29/2022 treatment with 5-FU plus leucovorin and Avastin, repeating every 2 weeks.      HISTORY OF PRESENT ILLNESS:  The patient is a 41 y.o. female the above-mentioned history who is here today for lab review and prior to maintenance chemotherapy.     Patient reports she is doing well tolerating chemotherapy, no worsening of pre-existing peripheral neuropathy.  Performance status ECOG 1-0.  Denies nausea vomiting.  Denies fever sweating chills.  The left lower abdomen more infection has resolved, no further pain or secretion of pus.     Laboratory study today on 4/12/2022 reported normal CBC with ANC 5190 out of WBC 6750, hemoglobin 15.6 and platelets 188,000.     We will proceed ahead with maintenance chemotherapy today on 4/12/2022.              Past Medical History:   Diagnosis Date   • Abdominopelvic abscess (HCC) 08/12/2020    ADMITTED TO [facility]   • Abnormal Pap smear of cervix 02/02/1998    JULIUS I   • Anemia in pregnancy    • Anemia in pregnancy    • Anxiety    • Bilateral epiphora 04/2020   • Bilateral hand swelling 05/02/2020    SEEN AT [facility] ER   • Cervical lymphadenitis 06/06/2012    SEEN AT [facility] ER   • Chemotherapy induced diarrhea 01/2021   • Chemotherapy induced neutropenia (HCC) 04/2020   • Chemotherapy-induced nausea 02/2020   • Chemotherapy-induced thrombocytopenia 05/2020   • Chronic diarrhea    • Colon polyps     FOLLOWED BY   GERONIMO ESPARZA   • Cystitis 2020    WITH DEHYDRATION, ADMITTED TO [facility]   • Cystitis 10/27/2020   • Depression    • Drug-induced peripheral neuropathy ([facility]) 2020   • Factor V Leiden mutation ([facility])    • Fistula of intestine    • GERD (gastroesophageal reflux disease)    • Hand foot syndrome 2020   • Heart murmur     IN CHILDHOOD   • Hematochezia    • Hemorrhoids    • Heterozygous factor V Leiden mutation ([facility])     DX 2019   • History of anemia    • History of chemotherapy     FOLLOWED BY DR. ALEXANDRU HUNT   • History of gestational diabetes    • History of pre-eclampsia    • History of radiation therapy     FOLLOWED BY DR. JAVON LEWIS   • History of TB skin testing 2009    TB Skin Test   • HPV in female    • Hypokalemia 2019   • Hypomagnesemia 2019   • Hyponatremia 2021   • IBS (irritable bowel syndrome)    • Ileostomy in place ([facility])     FOLLOWED BY DR. GERONIMO ESPARZA   • Infected insect bite of neck 2016    SEEN AT [facility]   • Kidney stones 2007    SEEN AT [facility] ER   • Lump of right breast     SWOLLEN LYMPH NODE   • Lung cancer ([facility]) 2020    METASTATIC LUNG CANCER   • Macrocytosis 2021   • Monopolar depression ([facility])    • On anticoagulant therapy    • Pain associated with surgical procedure 2020   • Palmar-plantar erythrodysesthesia 2021   • Perirectal abscess 2020   • Pulmonary embolism without acute cor pulmonale ([facility]) 09/20/2019    X 3; ADMITTED TO [facility]   • Pulmonary nodule, right 2020   • Rectal cancer ([facility]) 2019    STAGE IIA, INVASIVE MODERATELY DIFFERENTIATED ADENOCARCINOMA, CHEMO AND XRT FINISHED 2019   • Right shoulder pain 2020    SEEN AT [facility] ER   • Right ureteral stone 10/01/2019    SEEN AT [facility] ER   • SAB (spontaneous ) 1996     A2-1 INDUCED   • Sciatica    • Sepsis due to cellulitis ([facility]) 2002    LEFT GREAT TOE, ADMITTED TO [facility]   • Tachycardia 2020   • 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   • Urinary urgency 03/2020     Past Surgical History:   Procedure Laterality Date   • ABDOMINAL SURGERY     • CHOLECYSTECTOMY N/A 10/10/2003    LAPAROSCOPIC WITH CHOLANGIOGRAM, DR. JAMEY TALAVERA AT [facility]   • COLON RESECTION N/A 12/2/2019    Procedure: laparoscopic low anterior colon resection with mobilization of splenic flexure and diverting loop ileostomy: ERAS;  Surgeon: Padmaja Esparza MD;  Location: [facility] MAIN OR;  Service: General   • COLON RESECTION N/A 8/3/2020    Procedure: LOW ANTERIOR COLON RESECTION, COLOANAL ANASTOMOSIS, MOBILIZATION SPLENIC FLEXURE;  Surgeon: Padmaja Esparza MD;  Location: [facility] MAIN OR;  Service: General;  Laterality: N/A;   • COLONOSCOPY N/A 7/15/2020    PATENT ANASTAMOSIS IN MID RECTUM, RESCOPE IN 1 YR, DR. PADMAJA ESPARZA AT [facility]   • COLONOSCOPY W/ POLYPECTOMY N/A 07/08/2019    15 MM TUBULOVILLOUS ADENOMA POLYP IN HEPATIC FLEXURE, 20 MMTUBULOVILLOUS ADENOMA WITH HIGH GRADE DYSPLASIA IN RECTOSIGMOID, 4 CM MASS IN MID RECTUM, PATH: INVASIVE MODERATELY DIFFERENTIATED ADENOCARCINOMA, DR. JENNIFER LI AT [facility]   • DILATATION AND EVACUATION N/A 2009   • ENDOSCOPY N/A 07/08/2019    LA GRADE A ESOPHAGITIS, GASTRITIS, ALL BIOPSIES BENIGN, DR. JENNIFER LI AT [facility]   • INCISION AND DRAINAGE PERIRECTAL ABSCESS N/A 8/14/2020    Procedure: INCISION AND DRAINAGE OF retrorectal dehiscence abcess with drain placement and irrigation;  Surgeon: Padmaja Esparza MD;  Location: [facility];  Service: General;  Laterality: N/A;   • PAP SMEAR N/A 01/24/2014   • SIGMOIDOSCOPY N/A 7/24/2019    INFILTRATIVE PARTIALLY OBSTRUCTING LARGE RECTAL CANCER, AREA TATOOED, DR. PADMAJA ESPARZA AT [facility]   • SIGMOIDOSCOPY N/A 11/23/2019    AN ULCERATED PARTIALLY OBSTRUCTING MASS IN MID RECTUM, AREA TATTOOED, DR. PADMAJA ESPARZA AT [facility]   • SIGMOIDOSCOPY N/A 7/22/2021    PATENT ANASTAMOSIS IN DISTAL RECTUM, RESCOPE IN 1 YR,   GERONIMO YE AT [facility]   • TRANSRECTAL ULTRASOUND N/A 7/24/2019    Procedure: ULTRASOUND TRANSRECTAL;  Surgeon: Geronimo Ye MD;  Location: [facility] ENDOSCOPY;  Service: General   • URETEROSCOPY LASER LITHOTRIPSY WITH STENT INSERTION Right 10/30/2019    DR. ESTUARDO BEASLEY AT [place]   • VAGINAL DELIVERY N/A 09/18/1998   • VENOUS ACCESS DEVICE (PORT) INSERTION Left 8/9/2019    Procedure: INSERTION VENOUS ACCESS DEVICE;  Surgeon: Sj Joseph MD;  Location: [facility] OR [facility];  Service: General   • VENOUS ACCESS DEVICE (PORT) INSERTION N/A 12/18/2020    Procedure: INSERTION VENOUS ACCESS DEVICE right side, removal venous access device left side;  Surgeon: Sj Joseph MD;  Location: [facility] OR [facility];  Service: General;  Laterality: N/A;   • WISDOM TOOTH EXTRACTION Bilateral 1993       HEMATOLOGIC/ONCOLOGIC HISTORY:      The patient reports she has intermittent rectal bleeding and urgency, mucous with her stool, starting sometime in 2016. At that time she was referred to GI service, and was diagnosed of irritable bowel syndrome. Nevertheless she had worsening urgency for bowel movement, and had a couple of incidents losing control of stool. She was recently seen by Roland Thorpe MD again and had colonoscopy and EGD exam on 07/08/2019. She was found having a circumferential rectal mass. According to the procedure note, the patient had a fungating circumferential bleeding 4 cm mass in the middle rectum, at distance between 13 cm and 17 cm from the anus. Mass was causing partial obstruction. There were also colon polyps found at the hepatic flexure and also at the rectosigmoid junction 23 cm from the anus. Both were resected and retrieved. EGD examination reported grade A esophagitis at the GE junction and patchy discontinuous erythema and aggravation of the mucosa without bleeding in the stomach body. There is normal mucosa of the duodenum. Pathology evaluation from this procedure reported moderately differentiated  adenocarcinoma involving the rectal mass. The rectal sigmoid junction polyp was tubular/tubulovillous adenoma with high grade dysplasia negative for invasive malignancy. The hepatic flexure polyp was also tubular/tubulovillous adenoma negative for high grade dysplasia or malignancy. The biopsy from the esophagus reports squamocolumnar mucosa with inflammatory changes suggestive of mild reflux esophagitis, negative for interstitial metaplasia. Gastric biopsy was benign and duodenal biopsy was also benign. There is no mention of H-pylori.     Patient was subsequently referred to colorectal surgeon Padmaja Ye MD for further evaluation. The patient had CT scan examination for chest, abdomen and pelvis requested by Dr. Ye and were done on 07/13/2019. The study reported no evidence of lymphadenopathy in the abdomen and pelvis. There was wall thickening of the rectosigmoid junction. Normal spleen, pancreas, adrenal glands and kidneys. There was fatty liver infiltration but no focal lymphatic lesions. There was a small 6-7 mm noncalcified nodule in the right upper lobe and a tiny 3 mm subpleural nodule in the right middle lobe. No mediastinal or hilar lymphadenopathy.     Dr. Ye performed staging rectal ultrasound examination. This study reported tumor penetrating through the muscularis propria as T3 disease; there were no lymph nodes identified.    She had a hospitalization in late September 2019 because of newly discovered pulmonary emboli.  She was on prophylactic Lovenox prior to the incident of PE.  Now she is on full dose Xarelto anticoagulation.  Patient reports no further chest pain dyspnea.  She denies bleeding or bruising.  During her hospitalization, she was seen by Dr. Ye, who plans to have surgery 8 to 12 weeks after finishing radiation therapy.  She finished her radiation on 9/19/2019.     I noticed patient also presented to the emergency room on 10/1/2019 complaining of right flank area pain.  I  reviewed the images studies and indeed she has a very small 1 to 2 mm obstructing kidney stone in the UV junction.  Patient is still symptomatic with some pains and dysuria.  She denies fever sweating or chills.    Laboratory study on 10/7/2019 reported normal CBC including hemoglobin 13.1, platelets 301,000, WBC 6170 and ANC 4900 lymphocytes 590 monocytes 480.      The nurse reported malfunction of port-a-catheter on 10/7/2019.  Port study on 10/8/2019 showed fibrin sheath around the distal aspect of the Mediport catheter in the SVC. This does not appear to hinder injection, but does prevent aspiration at this time.    Patient underwent surgical resection of the colon on 12/2/2019 with Dr. Ye.  Pathology evaluation reported residual T3 disease, also 1 out of 14 lymph nodes positive for malignancy.  So this patient in either had at least stage IIIb disease (T3 N1 M0?).      Adjuvant chemotherapy FOLFOX 6 will be started on 1/22/2020.  Laboratory study reported iron saturation 10%, free iron 31 TIBC 319 and ferritin 168.  Her hemoglobin was 11.8, WBC 5600, and platelets 347,000.    Patient was here on 02/12/2020 for cycle 2 of her FOLFOX.  This is delayed x1 week secondary to neutropenia.  The patient ultimately received 3 days worth of Neupogen with recovery of her blood counts.  Of note, the patient struggled with significant nausea without any episodes of vomiting following cycle #1 of chemotherapy resulting in significant weight loss.  She is up 12 pounds over the last week as her appetite has normalized.  We will add Emend to her care plan.    She is having several loose stools per her colostomy and has been hesitant to take Imodium due to prior history of constipation.  I encouraged her to try this with a maximum of 8 tablets/day.  She denies any infectious symptoms including fevers or chills.  No excess bleeding or bruising noted.  She had the expected cold sensitivity related to the Oxaliplatin for about 3  days following treatment.    Labs from 02/12/2020 demonstrated total white blood cell count of 5.14, ANC of 3.06, hemoglobin of 11.2, platelets of 211,000.  She was found to be iron deficient last week and is intolerant to oral iron secondary to GI distress.  For this reason, she initiated IV iron therapy with Injectafer last week.  She had received her second dose 02/12/2020    Patient has normalized hemoglobin 12.2 on 2/26/2020.    She reported on 5/5/2020 she had a recent visit to the emergency department for what was suspected to be an allergic reaction.  She called our on-call service on Saturday with reports of hand and face swelling.  She was instructed to proceed to the emergency department at which time she was assessed and prescribed a Medrol Dosepak.  She had just completed her 5-FU and was unhooked on Friday, 5/3/2020.  Her symptoms have improved.  She does report persistent hyperpigmentation and mild swelling of the palms of the hands but this is much improved.  It was her right hand specifically that was swollen.  Her facial swelling has resolved.  She continues on Cefdinir nd since has 1 day remaining, she was prescribed cefdinir for a UTI requiring hospitalization at the end of April.  Reports no new symptoms.  Her labs are stable.  She is scheduled for treatment again.    Patient states at this time she is not tolerating her chemotherapy well.     Patient seen in the emergency department on 5/2/2020 for what was suspected to be an allergic reaction.  She called our on-call service on Saturday explaining that she was experiencing hand and face swelling.  She was instructed to proceed to the emergency department at which time she was assessed and prescribed a Medrol Dosepak.  She had just completed her 5-FU and was unhooked on Friday, 5/1/2020.      She reports since her ED visit on 5/2/2020 her symptoms have not improved. Her hands and feet were swollen upon presenting to the ED and she could barely  make a fist. She states that she feels so swollen she is not able to stand it. She states that her skin on the hands and feet are peeling extensively as well, besides changing color to more dark.     She also reports significant fatigue after her first week of the 5-FU treatment but she expected this side effect. She also notices significant increase in her neuropathy to the point that she is not able to even walk around in her home without her house slippers due to irritation from her rugs. She denies and associated nausea or vomiting at this time. She also has episodes of epistaxis every day after the chemotherapy cycle #7.  She does report working full-time during the week of chemotherapy.    Laboratory studies, 5/13/2020, show moderate thrombocytopenia platelets 123,000, low normal WBC 4140 including ANC 2720 and normal hemoglobin 13.4.  Because significant hand-foot syndrome, will decrease both 5-FU and oxaliplatin by 50%, and eliminate bolus dose of 5-FU.    On 5/21/2020 patient had a PET scan performed which showed no convincing evidence of residual disease in abdomen or pelvis, no metastatic disease within the chest or neck.     Cycle #8 FOLFOX 6 was given on 5/13/2020, with 50% dose reduction for both 5-FU and oxaliplatin, and also examination of bolus 5-FU.  She states on 5/27/2020 that with the recent reduction of the chemotherapy she feels significantly better. She has more energy and is able to do her daily routine.      PET scan examination on 5/21/2020 reported no evidence of metastatic disease in chest abdomen pelvis.  There was a stable 6 mm right upper lobe pulmonary nodule.    Laboratory studies on 5/27/2020 showed mild leukocytopenia WBC 3720 but a normal ANC 2250 and lymphocytes 630.  Normal platelets 163,000 and hemoglobin 12.6.  Chemistry lab reported normal renal function, liver function panel and a electrolytes, elevated glucose 164.    Laboratory studies 6/24/2020, showed normal hemoglobin  13.4 but macrocytosis .9.  Normal platelets 210,000 and WBC 5870.  She had normal CMP.     Patient last time was here in late June 2020.  Since that time she had surgical procedure for low anterior colon resection, coloanal anastomosis on 8/3/2020.  She later developed a perirectal abscess, required surgical drainage on 8/14/2020.  She was discharged on 8/27/2020.    Patient reports to me she still has lower abdominal wall vacuum suction in place.  She denies fever sweating chills.  Performance status is ECOG 1.  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  She does have visiting nurse come to the home for wound care.    CT scan for chest abdomen pelvis on 9/9/2020 reported a new 8 mm noncalcified pulmonary nodule in the left lower lobe.  Otherwise stable right upper lobe 6 mm pulmonary nodule, and no evidence of disease recurrence in the abdomen or pelvis.     Laboratory study on 9/16/2020 reported elevated AST 55, ALT 95, alk phosphatase 143 but normal total bilirubin 0.3.  Chemistry lab otherwise unremarkable.  She has completed normal CBC.      Because of the abnormal CT scan examination for chest abdomen pelvis on 9/9/2020 reported a new 8 mm noncalcified pulmonary nodule in the left lower lobe, we obtained a PET scan examination for further evaluation, which was done on 9/18/2020.  This study reported several pulmonary nodules, some of them are hypermetabolic, newly developed compared to previous PET scan in May 2020.  Certainly does highly suspicious for metastatic disease.  There are also hypermetabolic activity in the abdomen and pelvic area which is hard to differentiate from surgical scar versus metastatic malignancy.    Laboratory study on 10/13/2020 reported normal CBC with Hb 13.9, platelets 302,000 and WBC 5520 including ANC 3830.  Chemistry lab reported normalized AST 20, alk phosphatase 116 improved ALT 35, and maintains normal bilirubin, with normal electrolytes  and liver function panel.     Patient was started on first cycle of palliative chemotherapy FOLFIRI on 10/13/2020.    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  Patient had a new port placed 12/18/2020, and had held her Xarelto for 2 days prior to surgery.  She presented to the ER on 12/21/20 with complaints of facial and arm swelling for 3 days.  She also noted increased shortness of breath.  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  Thrombus within the right internal jugular vein cannot be excluded.  Patient was started on IV heparin, and eventually transitioned to weight-based Lovenox, which she now continues.    PET scan examination on 9/24/2021 reported further shrinking of the tiny right upper lobe pulmonary nodule.  Otherwise no new lesions.  No evidence for metastatic disease in other areas.      Patient had CT scan for chest with IV contrast obtained on 12/14/2021 which reported small tiny stable pulmonary nodules. There is a 4 mm right upper lobe pulmonary nodule. There is also a stable 4 mm left lower lobe pulmonary nodule. There is also stable left upper lobe micronodule.     Laboratory studies today on 12/21/2021 reported normal hemoglobin 15.9 however .0, platelets 218,000 WBC 5360 including neutrophils 3730 lymphocytes 980. Chemistry lab reported normal renal function, electrolytes, glucose, and marginally elevated ALT 55, AST 34 but normal total bilirubin and alk phosphatase.       MEDICATIONS    Current Outpatient Medications:   •  clonazePAM (KlonoPIN) 1 MG tablet, TAKE 1 TABLET BY MOUTH 3 TIMES A DAY AS NEEDED FOR ANXIETY OR SEIZURES, Disp: 90 tablet, Rfl: 1  •  Cyanocobalamin (VITAMIN B-12 PO), Take  by mouth., Disp: , Rfl:   •  dicyclomine (BENTYL) 20 MG tablet, TAKE 1 TABLET BY MOUTH EVERY 6  HOURS AS NEEDED, Disp: 360 tablet, Rfl: 0  •  diphenoxylate-atropine (LOMOTIL) 2.5-0.025 MG per tablet, TAKE 1 TABLET BY MOUTH 4 (FOUR) TIMES A DAY AS NEEDED FOR DIARRHEA., Disp: 120 tablet, Rfl: 1  •  enoxaparin (LOVENOX) 100 MG/ML solution syringe, INJECT 0.9 ML UNDER THE SKIN INTO THE APPROPRIATE AREA AS DIRECTED EVERY 12 (TWELVE) HOURS., Disp: 60 each, Rfl: 2  •  escitalopram (LEXAPRO) 20 MG tablet, TAKE 1 TABLET BY MOUTH EVERY DAY, Disp: 90 tablet, Rfl: 3  •  famotidine (PEPCID) 20 MG tablet, TAKE 1 TABLET BY MOUTH TWICE A DAY, Disp: 180 tablet, Rfl: 1  •  fluorouracil in dextrose 5 % 250 mL infusion, Infuse  into a venous catheter 1 (One) Time. Takes twice a month., Disp: , Rfl:   •  Loratadine 10 MG capsule, Take 10 mg by mouth Every Evening., Disp: , Rfl:   •  metoprolol succinate XL (TOPROL-XL) 25 MG 24 hr tablet, Take 0.5 tablets by mouth Every Night., Disp: 45 tablet, Rfl: 3  •  mineral oil-hydrophilic petrolatum (AQUAPHOR) ointment, Apply 1 application topically to the appropriate area as directed As Needed for Dry Skin., Disp: , Rfl:   •  ondansetron (ZOFRAN) 8 MG tablet, TAKE 1 TABLET BY MOUTH 3 (THREE) TIMES A DAY AS NEEDED FOR NAUSEA OR VOMITING., Disp: 60 tablet, Rfl: 0  •  promethazine (PHENERGAN) 25 MG tablet, Take 1 tablet by mouth Every 6 (Six) Hours As Needed for Nausea or Vomiting., Disp: 60 tablet, Rfl: 2  No current facility-administered medications for this visit.    Facility-Administered Medications Ordered in Other Visits:   •  bevacizumab (AVASTIN) 900 mg in sodium chloride 0.9 % 136 mL chemo IVPB, 900 mg, Intravenous, Once, Dong Nguyen MD PhD, Last Rate: 300 mL/hr at 04/12/22 1023, 900 mg at 04/12/22 1023  •  dexamethasone (DECADRON) IVPB 12 mg, 12 mg, Intravenous, Once, Dong Nguyen MD PhD  •  fluorouracil (ADRUCIL) 4,870 mg, sodium chloride 0.9 % 142.6 mL 240 mL chemo infusion - FOR HOME USE, 2,400 mg/m2 (Treatment Plan Recorded), Intravenous, Once, Dong Nguyen MD PhD  •  " leucovorin 810 mg in sodium chloride 0.9 % 331 mL IVPB, 400 mg/m2 (Treatment Plan Recorded), Intravenous, Once, Dong Nguyen MD PhD    ALLERGIES:   No Known Allergies    SOCIAL HISTORY:       Social History     Tobacco Use   • Smoking status: Current Every Day Smoker     Packs/day: 1.00     Years: 24.00     Pack years: 24.00     Types: Electronic Cigarette, Cigarettes   • Smokeless tobacco: Never Used   • Tobacco comment: 1 PPD   Vaping Use   • Vaping Use: Some days   • Substances: Nicotine   • Devices: Disposable   Substance Use Topics   • Alcohol use: Not Currently     Comment: Caffeine use rare   • Drug use: No         FAMILY HISTORY:   Mother has positive factor V Leiden mutation, although she did not have thrombosis, mother also is coronary disease with stenting, she also is occasional bruising.  Maternal grandmother had DVT, she had multiple surgical procedures.  Patient mother's half-brother had metastatic colon cancer at diagnosis in his 50s.  Maternal great aunt has breast cancer.  Patient will follow his skin cancer in his 60s, exclusive.           Vitals:    04/12/22 0843   BP: 118/53   Pulse: 92   Resp: 18   Temp: 97.1 °F (36.2 °C)   TempSrc: Temporal   SpO2: 95%   Weight: 95.9 kg (211 lb 6.4 oz)   Height: 165.1 cm (65\")   PainSc: 0-No pain     Current Status 4/12/2022   ECOG score 0     Physical Exam  Vitals reviewed.   Constitutional:       General: She is not in acute distress.     Appearance: Normal appearance. She is well-developed.   HENT:      Head: Normocephalic and atraumatic.   Eyes:      Pupils: Pupils are equal, round, and reactive to light.   Cardiovascular:      Rate and Rhythm: Regular rhythm. Tachycardia present.      Heart sounds: Normal heart sounds. No murmur heard.  Pulmonary:      Effort: Pulmonary effort is normal. No respiratory distress.      Breath sounds: Normal breath sounds.   Abdominal:      General: Bowel sounds are normal.      Palpations: Abdomen is soft.      " Tenderness: There is no abdominal tenderness.      Comments: Ostomy in right abdomen. Scattered bruises on the abdomen bilaterally from Lovenox injections.   Musculoskeletal:         General: Swelling (Trace edema in both ankles) present.      Cervical back: Normal range of motion.   Skin:     General: Skin is warm and dry.      Findings: Bruising present. No erythema or lesion.      Comments: Left lower abdomen wall abscess has resolved.  No signs of infection locally.   Neurological:      Mental Status: She is alert and oriented to person, place, and time. Mental status is at baseline.   Psychiatric:         Mood and Affect: Mood normal.         Thought Content: Thought content normal.           RECENT LABS:      Lab Results   Component Value Date    IRON 43 03/15/2022    TIBC 396 03/15/2022    FERRITIN 129.50 03/15/2022   Iron saturation 11%    Lab Results   Component Value Date    CEA 0.83 03/15/2022       Results from last 7 days   Lab Units 04/12/22  0834   WBC 10*3/mm3 6.75   NEUTROS ABS 10*3/mm3 5.19   HEMOGLOBIN g/dL 15.6   HEMATOCRIT % 48.1*   PLATELETS 10*3/mm3 188       Results from last 7 days   Lab Units 04/12/22  0834   SODIUM mmol/L 139   POTASSIUM mmol/L 4.4   CHLORIDE mmol/L 105   CO2 mmol/L 25.6   BUN mg/dL 12   CREATININE mg/dL 0.81   CALCIUM mg/dL 9.6   ALBUMIN g/dL 3.90   BILIRUBIN mg/dL 0.2   ALK PHOS U/L 93   ALT (SGPT) U/L 24   AST (SGOT) U/L 14   GLUCOSE mg/dL 109           IMAGING:      Assessment/Plan      ASSESSMENT:   1.  Metastatic rectal cancer.   · Initial rectal ultrasound examination reported T3N0 disease without lymphadenopathy.   · CT scan of chest, abdomen and pelvis reported no lymphadenopathy in the abdomen, pelvis or chest. She does have small pulmonary nodule 6-7 mm and 2 mm with indeterminate feature. There is no solid evidence of metastatic disease otherwise.   · Based on the CT scan and rectal ultrasound imaging studies, this patient had stage IIA (T3N0M0) disease.    · She had PET scan examination on 8/8/2019 which reported a hypermetabolic right upper lobe pulmonary nodule 6 mm with SUV 5.6.  This is suspicious for metastatic disease however it is too small to be biopsied.  This patient may have stage IV disease.   · She initiated concurrent radiation with continuous 5-FU on 8/12/2019.  · Patient finished concurrent chemoradiation therapy.  · Patient underwent surgical resection of the rectal tumor and diverting loop ileostomy on 12/2/2019 with Dr. Ye.  Pathology evaluation reported residual T3 disease, with 1 out of 14 lymph nodes positive for malignancy.  Certainly this patient has at least stage IIIb rectal cancer (T3 N1 M0?)  · On 1/22/2020 adjuvant chemotherapy FOLFOX 6 regimen initiated.   · On 2/5/2022 cycle #2 was delayed secondary to neutropenia.  She was given 3 days of Granix.   · Emend added with cycle 3.  With improved nausea control  · Continuing home Granix x3 days following 5-FU disconnect  · 3/11/2020 due for cycle 4, however, she is experiencing progressive abdominal pain and occasional fevers.   · CT scan performed on 3/13/2020 reveals no evidence of progressive or recurrent disease.  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  · Patient hospitalized 4/24-4/26/20 after cycle 5 of chemotherapy with acute UTI.  CT abdomen/pelvis noting fluid collection in the presacral region having diminished in size compared to CT dated 3/13/2020.  Patient was evaluated by Dr. Ye while in the hospital with further plans to evaluate possible colovaginal fistula following completion of chemotherapy.  Patient did respond to IV antibiotics and discharged home on oral cefdinir.  · 4/29/2020 cycle 6 of FOLFOX.  Urinary symptoms have resolved   · Patient seen in the [facility] ED on 5/2/2020 for what was suspected to be an allergic reaction.  She called our service on Saturday explaining that she was experiencing hand and face swelling.  She was instructed to proceed to the  emergency department at which time she was assessed and prescribed a Medrol Dosepak.  She had just completed her 5-FU and was unhooked on Friday, 5/1/2020.  Her symptoms have resolved in the office on assessment, 5/5/2020.  · The patient had grade 3 hand-foot syndrome based on symptomology.  Patient had cycle #8 of 5-FU on 5/13/2020. Due to her symptoms and poor tolerance to the 5-FU, her treatment dose will be reduced 50% for oxaliplatin and infusional 5-FU.  Bolus 5-FU will be discontinued..  · On 5/21/2020 patient had a PET scan and it showed no evidence of of metastatic disease in the neck, chest, abdomen or pelvis.  There was fluid accumulation/abscess.   · On 5/27/2020 discussed with the patient that we can discontinue chemotherapy at this time.  She will follow-up with Dr. Ye to discuss a possibility to reverse the ileostomy.  We likely will obtain anther PET scan in 3 to 4 months for reassessment.    · Patient was seen by Dr. Ye on 6/19/2019 for evaluation and to discuss possible take down of her ileostomy.  She is scheduled to have a gastrografin enema on 7/2/2020 to evaluate for a fistula, and then a colonoscopy on 7/15/2020, both done by Dr. eY.  She states that based on the above imaging and procedure findings, she may have a more extensive surgery done or just have her ileostomy reversed, which would likely be done in August 2020.  This will all be coordinated under the care of Dr. Ye.     · CT scan from 09/09/2020 and also compared to her previous PET scan examination from 05/21/2020 and also the original PET scan from 08/09/2019.  The original PET scan there is a small right upper lobe pulmonary nodule 6 mm with SUV 5.6. So in the 05/2020 PET scan the nodule is still there but activity seems much less and this most recent CT scan examination also documented the preexisting small pulmonary nodule however there is a new left lower lobe pulmonary nodule 8 mm in size and I shared with the  patient today this nodule is suspicious for metastatic disease. Laboratory studies reported minimal CEA level 1.32 on 09/09/2020. Liver function panel was only marginally abnormal with the ALT 45, the remaining is normal. However laboratory study today showed worsening AST 55, ALT 95 and alkaline phosphatase 143 but is still normal, total bilirubin 0.3.   · Recommended to have repeat PET scan examination for assessment because the left lower lobe pulmonary nodule is too small to be biopsied, and if the PET scan reports hypermetabolic lesion, I recommended to have stereotactic radiation therapy. Explained to patient that she is not a really good candidate to have thoracotomy for resection because she still has unhealed wound in the abdomen. Stereotactic radiation therapy will likely be a more feasible choice.   · PET scan examination obtained on 9/18/2020 showed metastatic disease.  It reported a few hypermetabolic pulmonary nodules, and some new small pulmonary nodules, all of them highly suspicious for metastatic disease.  She also has hypermetabolic activity in the abdomen and pelvic area which could be related to scar tissue from her recent surgery versus metastatic disease.  Nevertheless overall picture fits with metastatic cancer.  · Discussed with the patient on 9/20/2020, we reviewed the PET scan images together, and I recommended to have systematic chemotherapy, I do not think stereotactic reading therapy to the hypermetabolic lesions in the lungs warranted at this time because even those will be treated with reading therapy, she will still need systematic chemotherapy.  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  I did discuss with the patient using anti-EGFR monoclonal antibody versus anti-VEGF monoclonal antibody.     · Palliative chemotherapy cycle#1 FOLFIRI was started on 10/13/2020.  No bolus 5-FU given considering previous poor tolerance.    · NGS study from  [facility] reported positive for [place] mutation.  Microsatellite stable, mutation burden 5/Mb.   · Discussed with the patient 10/27/2020, because of the [place] mutation, she is not a candidate for anti-EGFR monoclonal antibody such as Erbitux or vectibix.  She could be a candidate for anti-VEGF monoclonal antibody, however because of active wound, she is not a candidate right now at this moment.  · Patient seen in the ED for acute right neck pain on 11/16/2020.  CT angiogram as well as CT of the cervical spine performed with no acute findings but notably one of her pulmonary nodules, left upper lobe, somewhat decreased in size.  Patient given prednisone pack.  Further details below.  · Cycle #6 chemotherapy will be resumed on 1/5/2021.  Started back on original irinotecan.  · PET scan examination obtained on 1/12/2021 after cycle #6 chemotherapy reported improved pulmonary nodule hypermetabolic activity.  Confirmed these are metastatic lesions.  · Patient is evaluated 1/19/2020, will continue cycle #7 FOLFIRI chemotherapy.  However Avastin will be on hold see next section.   · Patient was reevaluated on 2/2/2021, will continue chemotherapy cycle #8 FOLFIRI.  Avastin / biosimilar will be added.    · She talked to [facility] specialist in mid February 2021, and had recommended palliative chemotherapy without surgical intervention of metastatic lung lesions.   · On 3/2/2021, patient will proceed with cycle #10 FOLFIRI plus bevacizumab.  After 12 cycles, plan to start maintenance treatment.  · 3/16/2021 cycle 11.  Plus bevacizumab.  · 3/30/2021 cycle 12 FOLFIRI plus bevacizumab.  Having issues with worsening nausea despite premeds with dexamethasone, Aloxi, Emend, and Zofran at home.  Patient is requesting a dose of Phenergan, which is helped her in the past.  We will give this to her with treatment today.  · PET scan examination on 4/6/2021 reported no evidence of hypermetabolic  metastatic lesion.  · Discussed with the patient on 4/13/2021, we reviewed the PET scan results.  We recommended to switch to maintenance chemotherapy without irinotecan.  We will continue 5-FU and Avastin every 2 weeks.  We discussed possibility of switching to oral Xeloda treatment.  Discussed with the patient for side effects more so with hand-foot syndrome.  Patient is agreeable.   · Xeloda 2000 mg twice daily 7 days on, 7 days off along with Avastin initiated 4/27/2021.  · After only 5 doses of Xeloda significant hand-foot syndrome, Xeloda held. Patient requesting to be transitioned back to infusional 5-FU, as she felt that she tolerated infusional 5-FU without any problem.  The patient will receive 5-FU leucovorin and Avastin every 2 weeks.  Xeloda discontinued.  · 5/25/2021 continued cycle #4 maintenance 5-FU, leucovorin, Avastin tolerating this much better so far, we will continue this. Recommended to have 12 cycles of maintenance chemotherapy, then obtain PET scan for reevaluation.  We could discuss further treatment plan at that time.  · 9/14/2021 patient due for cycle 12 of additional maintenance 5-FU/leucovorin plus Avastin.  She continues to tolerate treatment well overall.  She is anxious in the office today with her Mediport not getting blood at first and also with upcoming scans being heavy on her mind.  She was instructed to take one of her Klonopin pills while here to help calm her mind.  Heart rate did improve from 140s down to 112.  Proceed with treatment today as scheduled.  · PET scan examination on 9/24/2021 reported further shrinking of the right upper lobe tiny pulmonary nodule.  No other new lesions.  · Discussed with patient on 9/24/2021 about further shrinking of the right upper lobe pulmonary nodule and no evidence for other new lesions. We recommended to have chemo break for 3 months with repeated CT scan for reevaluation.  · Recent CT scan for chest 12/14/2021 and abdomen CT from  11/29/2021 reported no evidence for active disease. The right upper lobe pulmonary nodule, left lower lobe pulmonary nodule minimal about 4 mm and stable. I discussed with patient today on 12/21/2021, recommended to give her another 3 months chemotherapy holiday and obtain CT scan afterwards for reevaluation. After discussion, patient is agreeable.   · CT scan examination for chest abdomen and pelvis obtained on 3/15/2022 reported a slight increase of the left upper lobe pulmonary nodule 5 mm, from previous 3 mm.  The other pulmonary nodules were stable in size.  There is also small left upper lobe groundglass changes.   · I reviewed images studies with the patient today on 3/23/2022, compared to multiple previous images including CT chest CT and PET scan, suspected disease progression.  Discussed with the patient, and I recommended to resume maintenance chemotherapy with 5-FU plus leucovorin plus Avastin repeating every 2 weeks, and obtaining CT scan for reassessment in 3 months.  Patient is agreeable.  · Patient resumed maintenance chemotherapy on 3/29/2022 with 5-FU leucovorin and Avastin.   · On 4/12/2022 patient reports tolerating chemotherapy.  We will continue every 2 weeks.      2.   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  Low-dose Lovenox discontinued and initiated Xarelto 20 mg daily.  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  · Patient held Xarelto for 2 days prior to MediPort removal from the left  chest wall and placement of new port in the right chest wall on 12/18/2020.  She resumed Xarelto that evening.  · Progressive swelling in the neck, face, upper extremities prompting hospitalization with CT scan showing thrombosis in the right subclavian vein and SVC.  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  Although she had been off of Xarelto for a few days, clearly Xarelto was not prohibiting progression of her thrombosis after she resumed treatment and there was some evidence to suggest thrombosis had been present at least in the innominate vein on prior scan in September but now appears chronic.  Current acute thrombosis involving SVC and right subclavian.  · Patient was admitted and placed on heparin drip  · Patient was evaluated by vascular surgery and intervention was not recommended for thrombolysis/thrombectomy.  · On 12/22/2020, the patient developed worsening shortness of breath, increasing upper extremity edema consistent with worsening SVC syndrome.  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  · Symptoms improved and patient was discharged 12/24/2020 on Lovenox 100 mg every 12 hours.    · Returns 12/29/2020 for evaluation continuing Lovenox, overall tolerating it well.  No bleeding issues.  · Improved edema 1/5/2021.  Will continue Lovenox weight-based every 12 hours.  · On 1/19/2021 and 2/2/2021, patient reports improved facial swelling.  She however does have being bruise on her abdomen wall where she does Lovenox injection.  However she does not have a spontaneous epistaxis, gum bleeding or other bleeding.   · On 2/2/2021, we discussed that side effects of Avastin/biosimilar related to thrombosis.  Since this patient already has been on Lovenox, I think the benefits from treatment for her cancer will  outweigh that risk of thrombosis, will proceed ahead with Avastin.  I cautioned patient to watch out for signs of worsening thrombosis patient voiced understanding.   · On 3/16/2021, no evidence of worsening DVT, continue Lovenox.  · 5/11/2021 Lovenox dosing will be adjusted due to patient's weight loss.  We discussed she needs 1 mg/kg.  Her syringes are 100 mg syringes she will do 0.9 mL subcu every 12 hours.  · 8/3/2021 Patient continues to have bruising on abdomen from lovenox injections.  Will need to monitor weight and adjust dosing in future if further weight loss.   · Stable condition on 9/28/2021.  Continue Lovenox anticoagulation.    · Patient reports no chest pain no dyspnea no leg edema. However she had bruising and also most recently abnormal small abscess for which she actually went to ER on 11/29/2021, had I&D. The wound is healing. No further drainage. Denies fever sweating or chills. After discussion, she will continue Lovenox injection for now.   · On 3/23/2022, patient reports good tolerance of Lovenox.  Nevertheless she still has small quantity of pus in the left lower abdomen abscess, she squeezes it every day to prevent it became worse.  She reports no fever sweating chills.  I recommended to start Augmentin 875 mg twice a day for 7 days.  She will continue Lovenox indefinitely.  · On 4/12/2022, patient reports resolution of the small abscess at left lower abdominal wall.     3.  This patient also has strong family history for malignancy the patient had appointment with genetic counseling on September 3, 2019.  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    4.  Mild anemia, improved since her surgery.    · She also has a history of iron deficiency.  Iron studies reveal iron saturation of 10%.  She was started on oral iron but was unable to tolerate due to GI side effects.   · Status post Injectafer 2/5/2020 and 2/12/2020   · Improved hemoglobin 13.5 on 1/5/2021.  · 3/16/2020 when hemoglobin now  up to 16.2, hematocrit 47.6.  Patient admits that she has started smoking again, and I have encouraged her to quit.  She denies any snoring or sleep apnea diagnosis.  Patient is not taking oral iron.  We will closely monitor.  · 3/30/2020 hemoglobin 15.7, HCT 47.5.  Patient states that she has cut back significantly on her smoking, although not quit yet.  I have encouraged her to continue working on this.  We will continue to closely monitor.  · Hemoglobin 14.6 on 6/8/2021 at the meantime he has worsening macrocytosis .6.    · Laboratory studies 6/22/2021 reported excellent folate > 20 ng/mL, however low normal B12 level 357 pg/mL.    · On 7/6/2021, normal hemoglobin 15.8, .9 and HCT 47.2%.  Patient was asked to start oral vitamin B12 at 1000 mcg daily.   · 9/14/2021 hemoglobin 17.0, hematocrit 52.1.  Monitor.  · On 9/28/2021 hemoglobin 16.1 hematocrit 48.5%, continue to monitor.   · Lab study on 12/14/2021 reported excellent ferritin 296 and iron saturation 25% with free iron 104 TIBC 424. Hemoglobin was 15.2 with .2.   · Normal hemoglobin 14.6 on 3/15/2022.  Iron study reported normal ferritin 129.5 ng/mL however slightly low iron saturation 11%.  Continue to monitor for now.  · On 4/12/2022 maintains normal hemoglobin 15.6.  Continue to monitor.    5.  Peripheral neuropathy secondary to chemotherapy.   · This is mild involving bilateral hands and feet as reported on 9/16/2020.   · Will avoid chemotherapy with oxaliplatin.  · Stable mild neuropathy as of 11/10/2020  · Patient reports worsening peripheral neuropathy and cycle #5 chemotherapy on 12/8/2020 with and without irinotecan.   · On 1/5/2021, patient reports improvement of peripheral neuropathy, will add irinotecan back to chemotherapy.  Continue to monitor and adjust medication.  · On 3/2/2021, patient reports no worsening of peripheral neuropathy after restarting irinotecan.   · This remains stable, may be slightly better as reported  on 3/23/2022..   · On 4/12/2022 patient reports stable peripheral neuropathy no worsening since resuming chemotherapy on 3/29/2022.    6.  History of hand-foot syndrome grade 3.    · It become so significant after cycle #7 FOLFOX treatment.  Discussed with patient on 5/13/2020, will discontinue the bolus 5-FU dose, and also decrease 50% of the infusion dose through the pump.   · We also use Medrol Dosepak for possible recurrent symptomology.  She responded this well.   · Her hand-foot syndrome improved.  · Under current treatment with FOLFIRI, patient not receiving 5-FU bolus.  No recurrent issues.   · Patient will be switched to maintenance chemotherapy using Xeloda in late April 2021.  · Following 5 doses of Xeloda, significant hand-foot syndrome with pain in the feet, significant erythema.  Xeloda held.  Will be further discussed with Dr. Nguyen to determine if the patient will resume 5-FU versus a dose reduction to Xeloda.  · Aggressive emollient moisturizer use twice daily for the past week and patient reports improvement in the dryness and erythema.  Xeloda will now be discontinued and patient will switch back to 5-FU.  · As of 5/25/2021 dryness and erythema/hyperpigmentation continues to improve.  Xeloda has been discontinued.  · 6/8/2021, patient reports much improved hand-foot syndrome, with remnant pigmentation on palms.  · On 7/6/2021, patient reports and had a some flareup of hand-foot syndrome, however improved after aggressive moisturizing cream and the urea cream.  Overall much tolerated infusional 5-FU compared to Xeloda.    · 7/20/2021 continues to have mild hyperpigmentation of her hands and feet bilaterally, she continues aggressive moisturization with utter balm with urea.  She also reports some soreness of her tailbone, and we discussed that this is likely due to loss of weight and muscle mass.  I advised her to go ahead and apply moisturizer to this area as there is one tiny fissure.  Also  recommended using a waffle pad or doughnut to sit on.  · 8/3/2021 patient reports her hand foot symptoms are stable and without worsening.  Continue to monitor.  · Symptoms stable, typically flaring about 24 hours after she is unhooked from her 5-FU infusional ball and then settling down with some mild peeling.   · Since off chemotherapy no specific complaints.   · No recurrent symptoms after resuming chemotherapy on 3/29/2022.    7.  Hyperlacrimation and mild scleral irritation related to 5-FU.  She has been taking steroid eyedrops.  This has improved her hyperlacrimation.  · Not currently an issue.     8.  Abnormal liver function panel.  · Overall LFTs have improved which is mild ongoing elevation of AST and ALT.  Continue to monitor.    · Slightly worse AST 43 ALT 84 on 9/28/2021.  Continue to monitor.  · Slightly elevated AST 34 ALT 55 on 12/21/2021. Continue to monitor.    · Normal liver function panel on 3/15/2022 and on 4/12/2022.    9.  Depression.  Patient seen by JULIET Davenport on 11/9/2020.  She was started on mirtazapine.  Lexapro was discontinued.  This has definitely improved her mood with the patient feeling overall much better.  She is sleeping better.  Appetite is improved with her actually eating more than she wants to.    · Condition is stable.  She plans to continue follow-up with supportive oncology.      10.  Proteinuria: 3/30/2020: Patient is 2+ protein in her urine.  We will continue with Avastin and closely monitor.  No need for 24-hour urine at this time.  · Persistent 2+ proteinuria on 4/13/2021.  continue to monitor.  · 5/25/2021 protein urine only 1+.    · 6/22/2021 persistent 1+ proteinuria.  Continue to monitor.  · 7/6/2021, trace protein.  Continue Avastin treatment and monitoring.  · 8/3/2021 1+ protein, stable from last cycle.   · On 8/17/2021, urine protein 2+.  She also had a 1+ leukocytes.  Patient is not symptomatic such as fever, dysuria or gross hematuria, however urine  culture obtained, with >100,000 E. Faecalis. Patient treated with Levaquin.   · No active problem currently.  Negative urine protein 4/12/2022.    11.  Tachycardia:   · Patient was seen by Dr. Bustos cardiologist on 4/6/2021.   · Follow up visit 7/22/2021 with plans to repeat evaluation in 3 months  · As noted above patient more tachycardic on 9/14/2021 in the setting of anxiety.  Improved with Klonopin.  · On 4/12/2022, patient has normal pulse 92     12.  Hypertension.  · /91 at visit 8/17/2021.  She was given clonidine for treatment that day and also prescribed lisinopril 10 mg daily.    · Patient unable to tolerate lisinopril, noting that her blood pressure bottomed out with systolic of barely 80 and feeling very fatigued and wiped out.  She has been checking her blood pressure off the lisinopril with systolics in the 120s to 130s at home and diastolic in the 80s to 90s.   · Blood pressure remained stable with systolic in the 120s-130s.   · On 12/21/2021 /84 with a pulse 98. She will remain off antihypertensives for now.    · On 3/23/2022 /89.   · Normal /53 4/12/2022.  Continue to monitor.      PLAN:  1. Proceed ahead with maintenance chemotherapy cycle #26 with 5-FU plus leucovorin plus Avastin, to be continued every 2 weeks.   2. Continue Lovenox 1 mg/kg twice daily.  3. Continue oral B12 at 1000 mcg daily.  4. Patient return in 2 weeks for APRN evaluation prior to chemotherapy, and she will come back to see me in 4 weeks for reassessment prior to cycle #28 maintenance chemotherapy.    Reviewed laboratory results with the patient and the discuss further management plan.  She voiced understanding.      Dong Nguyen MD PhD  04/12/22      CC:  Deepika Vela III NP-C   MD Perla Bowers MD

## 2022-04-14 ENCOUNTER — INFUSION (OUTPATIENT)
Dept: ONCOLOGY | Facility: HOSPITAL | Age: 42
End: 2022-04-14

## 2022-04-14 DIAGNOSIS — Z45.2 ENCOUNTER FOR FITTING AND ADJUSTMENT OF VASCULAR CATHETER: ICD-10-CM

## 2022-04-14 DIAGNOSIS — C20 ADENOCARCINOMA OF RECTUM: Primary | ICD-10-CM

## 2022-04-14 PROCEDURE — 25010000002 HEPARIN LOCK FLUSH PER 10 UNITS: Performed by: INTERNAL MEDICINE

## 2022-04-14 RX ORDER — HEPARIN SODIUM (PORCINE) LOCK FLUSH IV SOLN 100 UNIT/ML 100 UNIT/ML
500 SOLUTION INTRAVENOUS AS NEEDED
Status: DISCONTINUED | OUTPATIENT
Start: 2022-04-14 | End: 2022-04-14 | Stop reason: HOSPADM

## 2022-04-14 RX ORDER — HEPARIN SODIUM (PORCINE) LOCK FLUSH IV SOLN 100 UNIT/ML 100 UNIT/ML
500 SOLUTION INTRAVENOUS AS NEEDED
Status: CANCELLED | OUTPATIENT
Start: 2022-04-14

## 2022-04-14 RX ORDER — SODIUM CHLORIDE 0.9 % (FLUSH) 0.9 %
10 SYRINGE (ML) INJECTION AS NEEDED
Status: CANCELLED | OUTPATIENT
Start: 2022-04-14

## 2022-04-14 RX ORDER — SODIUM CHLORIDE 0.9 % (FLUSH) 0.9 %
10 SYRINGE (ML) INJECTION AS NEEDED
Status: DISCONTINUED | OUTPATIENT
Start: 2022-04-14 | End: 2022-04-14 | Stop reason: HOSPADM

## 2022-04-14 RX ADMIN — Medication 500 UNITS: at 10:01

## 2022-04-14 RX ADMIN — Medication 10 ML: at 10:00

## 2022-04-21 NOTE — PROGRESS NOTES
REASON FOR FOLLOW UP:     1. Rectal cancer, rectal ultrasound examination in July 2019 reported T3N0 disease without lymphadenopathy. She does have small pulmonary nodule 6-7 mm and 2 mm with indeterminate feature. There is no solid evidence of metastatic disease otherwise. Patient has stage IIA (T3N0M0) disease.  2. The patient is heterozygous factor V Leiden, was on prophylactic anticoagulation with Lovenox 40 mg daily given her increased risk of thrombosis with MediPort and GI malignancy.   3. PET scan on 8/8/2019 reported an 8 mm hypermetabolic right upper lobe pulmonary nodule, which is suspicious for metastatic as well.    4. Patient had a PowerPort placement on the left upper chest by Dr. Joseph on 8/9/2019.  5. Patient was started on concurrent infusional 5-FU chemoradiation therapy on 8/12/2019, with planned complete surgical resection and further adjuvant chemotherapy with intention to cure the disease.   6. Patient underwent surgical resection of the primary rectal cancer by Dr. Ye on 12/2/2019.  Pathology evaluation reported residual T3N1 disease stage IIIb.  7. Diarrhea related to therapy and radiation.   8. Patient was started cycle 1 day 1 of adjuvant FOLFOX 6 on 1/23/2020.  9. On 2/5/2020 FOLFOX 6 cycle 2 delayed secondary to neutropenia.  Patient was given 3 days of Granix injection.  After cycle #2 we planned 3 days of Granix with each cycle.   10. Patient also intolerant of oral iron.  Patient received 2 doses of IV injectafer on 02/05/2020 and 02/12/2020.   11. 02/12/2020 Proceed with cycle #2 of FOLFOX 6 with 3 days of Granix.  12. On 3/11/2020 cycle 4 postponed secondary to abdominal pain and occasional low-grade fevers.  CT scans ordered.  13. Cycle #4 resumed after CT scan revealed no evidence of disease.  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 Ye. patient has no fever.  Continue to observe.   14. Grade 3  hand-foot syndrome secondary to 5-FU after cycle #7 FOLFOX 6 chemotherapy, prompting ER visit.  Also has worsening peripheral neuropathy.   15. Cycle #8 FOLFOX 6 was given on 5/13/2020, with 50% dose reduction for both 5-FU and oxaliplatin, and also examination of bolus 5-FU.   16. PET scan examination on 5/21/2020 reported no evidence of metastatic disease in the chest abdomen pelvis.  Stable 6 mm RUL pulmonary nodule.  17. On 5/27/2020, I discussed with the patient that we can discontinue chemotherapy at this time.   18. Patient had a surgical procedure for low anterior colon resection, coloanal anastomosis on 8/3/2020.  19. CT scan for chest abdomen pelvis on 9/9/2020 reported a new 8 mm noncalcified pulmonary nodule in the left lower lobe of the lung.  Otherwise stable right upper lobe 6 mm pulmonary nodule, and no evidence of disease recurrence in the abdomen or pelvis.  20. PET scan examination on 9/18/2020 reported multiple hypermetabolic small pulmonary nodules/ new pulmonary nodules.   21. Patient was started on pelvic chemotherapy with FOLFIRI regimen on 10/13/2020.   22. 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 It reported tumor mutation burden 5 Muts/Mb, microsatellite stable, TP53 mutation R282W, and others.   23. Cycle #5 was without irinotecan, due to peripheral neuropathy.  24. 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  Patient was switched to weight-based Lovenox injection.  25. Cycle #6 5-FU and irinotecan was delayed by 2 weeks because of the above incident.  26. Patient had a telemedicine evaluation at that the [facility] [place] in February 2021.  They agreed with our treatment plan for palliative chemotherapy followed by maintenance chemotherapy.    27. PET scan examination on 4/6/2021 after cycle #12 palliative chemotherapy reported no  evidence of hypermetabolic metastatic lesion.   28. Patient was started on maintenance chemotherapy with 5-FU and Avastin on 4/13/2021. (Unable to tolerate Xeloda because of a significant hand-foot syndrome).   29. PET scan on 9/24/2021 obtained after cycle #12 maintenance chemotherapy with 5-FU, leucovorin, Avastin reported no evidence for active disease. We recommended 3 months chemotherapy holiday.  30. CT scan examination on 3/15/2022 reported slightly disease progression with increase in size of small pulmonary nodule.  We started patient back on maintenance chemotherapy on 3/29/2022 treatment with 5-FU plus leucovorin and Avastin, repeating every 2 weeks.      HISTORY OF PRESENT ILLNESS:  The patient is a 41 y.o. female the above-mentioned history who is here today for follow-up and evaluation prior to cycle #27 chemotherapy.  She continues to receive maintenance 5-FU, leucovorin, and Avastin.  She continues to tolerate chemotherapy well.  She continues on Zofran once daily the week following chemotherapy and has no issues with nausea or vomiting.  She is eating adequately.  Output from colostomy remains stable.  Peripheral neuropathy remains stable.  She denies fever or chills.  She denies new or worsening pain.  She denies new or worsening skin rash.  She has no new concerns today.    Past Medical History:   Diagnosis Date   • Abdominopelvic abscess (HCC) 08/12/2020    ADMITTED TO [facility]   • Abnormal Pap smear of cervix 02/02/1998    JULIUS I   • Anemia in pregnancy    • Anemia in pregnancy    • Anxiety    • Bilateral epiphora 04/2020   • Bilateral hand swelling 05/02/2020    SEEN AT [facility] ER   • Cervical lymphadenitis 06/06/2012    SEEN AT [facility] ER   • Chemotherapy induced diarrhea 01/2021   • Chemotherapy induced neutropenia (HCC) 04/2020   • Chemotherapy-induced nausea 02/2020   • Chemotherapy-induced thrombocytopenia 05/2020   • Chronic diarrhea    • Colon polyps     FOLLOWED BY DR. GERONIMO ESPARZA   • Cystitis  2020    WITH DEHYDRATION, ADMITTED TO [facility]   • Cystitis 10/27/2020   • Depression    • Drug-induced peripheral neuropathy ([facility]) 2020   • Factor V Leiden mutation ([facility])    • Fistula of intestine    • GERD (gastroesophageal reflux disease)    • Hand foot syndrome 2020   • Heart murmur     IN CHILDHOOD   • Hematochezia    • Hemorrhoids    • Heterozygous factor V Leiden mutation ([facility])     DX 2019   • History of anemia    • History of chemotherapy     FOLLOWED BY DR. ALEXANDRU HUNT   • History of gestational diabetes    • History of pre-eclampsia    • History of radiation therapy     FOLLOWED BY DR. JAVON LEWIS   • History of TB skin testing 2009    TB Skin Test   • HPV in female    • Hypokalemia 2019   • Hypomagnesemia 2019   • Hyponatremia 2021   • IBS (irritable bowel syndrome)    • Ileostomy in place ([facility])     FOLLOWED BY DR. GERONIMO ESPARZA   • Infected insect bite of neck 2016    SEEN AT [facility]   • Kidney stones 2007    SEEN AT [facility] ER   • Lump of right breast     SWOLLEN LYMPH NODE   • Lung cancer ([facility]) 2020    METASTATIC LUNG CANCER   • Macrocytosis 2021   • Monopolar depression ([facility])    • On anticoagulant therapy    • Pain associated with surgical procedure 2020   • Palmar-plantar erythrodysesthesia 2021   • Perirectal abscess 2020   • Pulmonary embolism without acute cor pulmonale ([facility]) 09/20/2019    X 3; ADMITTED TO [facility]   • Pulmonary nodule, right 2020   • Rectal cancer ([facility]) 2019    STAGE IIA, INVASIVE MODERATELY DIFFERENTIATED ADENOCARCINOMA, CHEMO AND XRT FINISHED 2019   • Right shoulder pain 2020    SEEN AT [facility] ER   • Right ureteral stone 10/01/2019    SEEN AT [facility] ER   • SAB (spontaneous ) 1996     A2-1 INDUCED   • Sciatica    • Sepsis due to cellulitis ([facility]) 2002    LEFT GREAT TOE, ADMITTED TO 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 JAMEY TALAVERA AT [facility]   • COLON RESECTION N/A 12/2/2019    Procedure: laparoscopic low anterior colon resection with mobilization of splenic flexure and diverting loop ileostomy: ERAS;  Surgeon: Padmaja Esparza MD;  Location: [facility] MAIN OR;  Service: General   • COLON RESECTION N/A 8/3/2020    Procedure: LOW ANTERIOR COLON RESECTION, COLOANAL ANASTOMOSIS, MOBILIZATION SPLENIC FLEXURE;  Surgeon: Padmaja Esparza MD;  Location: [facility] MAIN OR;  Service: General;  Laterality: N/A;   • COLONOSCOPY N/A 7/15/2020    PATENT ANASTAMOSIS IN MID RECTUM, RESCOPE IN 1 YR, DR. PADMAJA ESPARZA AT [facility]   • COLONOSCOPY W/ POLYPECTOMY N/A 07/08/2019    15 MM TUBULOVILLOUS ADENOMA POLYP IN HEPATIC FLEXURE, 20 MMTUBULOVILLOUS ADENOMA WITH HIGH GRADE DYSPLASIA IN RECTOSIGMOID, 4 CM MASS IN MID RECTUM, PATH: INVASIVE MODERATELY DIFFERENTIATED ADENOCARCINOMA, DR. JENNIFER LI AT [facility]   • DILATATION AND EVACUATION N/A 2009   • ENDOSCOPY N/A 07/08/2019    LA GRADE A ESOPHAGITIS, GASTRITIS, ALL BIOPSIES BENIGN, DR. JENNIFER LI AT [facility]   • INCISION AND DRAINAGE PERIRECTAL ABSCESS N/A 8/14/2020    Procedure: INCISION AND DRAINAGE OF retrorectal dehiscence abcess with drain placement and irrigation;  Surgeon: Padmaja Esaprza MD;  Location: [facility];  Service: General;  Laterality: N/A;   • PAP SMEAR N/A 01/24/2014   • SIGMOIDOSCOPY N/A 7/24/2019    INFILTRATIVE PARTIALLY OBSTRUCTING LARGE RECTAL CANCER, AREA TATOOED, DR. PADMAJA ESPARZA AT [facility]   • SIGMOIDOSCOPY N/A 11/23/2019    AN ULCERATED PARTIALLY OBSTRUCTING MASS IN MID RECTUM, AREA TATTOOED, DR. PADMAJA ESPARZA AT [facility]   • SIGMOIDOSCOPY N/A 7/22/2021    PATENT ANASTAMOSIS IN DISTAL RECTUM, RESCOPE IN 1 YR, DR. PADMAJA ESPARZA AT [facility]   •  TRANSRECTAL ULTRASOUND N/A 7/24/2019    Procedure: ULTRASOUND TRANSRECTAL;  Surgeon: Padmaja Ye MD;  Location: [facility] ENDOSCOPY;  Service: General   • URETEROSCOPY LASER LITHOTRIPSY WITH STENT INSERTION Right 10/30/2019    DR. ESTUARDO BEASLEY AT [place]   • VAGINAL DELIVERY N/A 09/18/1998   • VENOUS ACCESS DEVICE (PORT) INSERTION Left 8/9/2019    Procedure: INSERTION VENOUS ACCESS DEVICE;  Surgeon: Sj Joseph MD;  Location: [facility] OR [facility];  Service: General   • VENOUS ACCESS DEVICE (PORT) INSERTION N/A 12/18/2020    Procedure: INSERTION VENOUS ACCESS DEVICE right side, removal venous access device left side;  Surgeon: Sj Joseph MD;  Location: [facility] OR [facility];  Service: General;  Laterality: N/A;   • WISDOM TOOTH EXTRACTION Bilateral 1993       HEMATOLOGIC/ONCOLOGIC HISTORY:      The patient reports she has intermittent rectal bleeding and urgency, mucous with her stool, starting sometime in 2016. At that time she was referred to GI service, and was diagnosed of irritable bowel syndrome. Nevertheless she had worsening urgency for bowel movement, and had a couple of incidents losing control of stool. She was recently seen by Roland Thorpe MD again and had colonoscopy and EGD exam on 07/08/2019. She was found having a circumferential rectal mass. According to the procedure note, the patient had a fungating circumferential bleeding 4 cm mass in the middle rectum, at distance between 13 cm and 17 cm from the anus. Mass was causing partial obstruction. There were also colon polyps found at the hepatic flexure and also at the rectosigmoid junction 23 cm from the anus. Both were resected and retrieved. EGD examination reported grade A esophagitis at the GE junction and patchy discontinuous erythema and aggravation of the mucosa without bleeding in the stomach body. There is normal mucosa of the duodenum. Pathology evaluation from this procedure reported moderately differentiated adenocarcinoma involving the  rectal mass. The rectal sigmoid junction polyp was tubular/tubulovillous adenoma with high grade dysplasia negative for invasive malignancy. The hepatic flexure polyp was also tubular/tubulovillous adenoma negative for high grade dysplasia or malignancy. The biopsy from the esophagus reports squamocolumnar mucosa with inflammatory changes suggestive of mild reflux esophagitis, negative for interstitial metaplasia. Gastric biopsy was benign and duodenal biopsy was also benign. There is no mention of H-pylori.     Patient was subsequently referred to colorectal surgeon Padmaja Ye MD for further evaluation. The patient had CT scan examination for chest, abdomen and pelvis requested by Dr. Ye and were done on 07/13/2019. The study reported no evidence of lymphadenopathy in the abdomen and pelvis. There was wall thickening of the rectosigmoid junction. Normal spleen, pancreas, adrenal glands and kidneys. There was fatty liver infiltration but no focal lymphatic lesions. There was a small 6-7 mm noncalcified nodule in the right upper lobe and a tiny 3 mm subpleural nodule in the right middle lobe. No mediastinal or hilar lymphadenopathy.     Dr. Ye performed staging rectal ultrasound examination. This study reported tumor penetrating through the muscularis propria as T3 disease; there were no lymph nodes identified.    She had a hospitalization in late September 2019 because of newly discovered pulmonary emboli.  She was on prophylactic Lovenox prior to the incident of PE.  Now she is on full dose Xarelto anticoagulation.  Patient reports no further chest pain dyspnea.  She denies bleeding or bruising.  During her hospitalization, she was seen by Dr. Ye, who plans to have surgery 8 to 12 weeks after finishing radiation therapy.  She finished her radiation on 9/19/2019.     I noticed patient also presented to the emergency room on 10/1/2019 complaining of right flank area pain.  I reviewed the images studies  and indeed she has a very small 1 to 2 mm obstructing kidney stone in the UV junction.  Patient is still symptomatic with some pains and dysuria.  She denies fever sweating or chills.    Laboratory study on 10/7/2019 reported normal CBC including hemoglobin 13.1, platelets 301,000, WBC 6170 and ANC 4900 lymphocytes 590 monocytes 480.      The nurse reported malfunction of port-a-catheter on 10/7/2019.  Port study on 10/8/2019 showed fibrin sheath around the distal aspect of the Mediport catheter in the SVC. This does not appear to hinder injection, but does prevent aspiration at this time.    Patient underwent surgical resection of the colon on 12/2/2019 with Dr. Ye.  Pathology evaluation reported residual T3 disease, also 1 out of 14 lymph nodes positive for malignancy.  So this patient in either had at least stage IIIb disease (T3 N1 M0?).      Adjuvant chemotherapy FOLFOX 6 will be started on 1/22/2020.  Laboratory study reported iron saturation 10%, free iron 31 TIBC 319 and ferritin 168.  Her hemoglobin was 11.8, WBC 5600, and platelets 347,000.    Patient was here on 02/12/2020 for cycle 2 of her FOLFOX.  This is delayed x1 week secondary to neutropenia.  The patient ultimately received 3 days worth of Neupogen with recovery of her blood counts.  Of note, the patient struggled with significant nausea without any episodes of vomiting following cycle #1 of chemotherapy resulting in significant weight loss.  She is up 12 pounds over the last week as her appetite has normalized.  We will add Emend to her care plan.    She is having several loose stools per her colostomy and has been hesitant to take Imodium due to prior history of constipation.  I encouraged her to try this with a maximum of 8 tablets/day.  She denies any infectious symptoms including fevers or chills.  No excess bleeding or bruising noted.  She had the expected cold sensitivity related to the Oxaliplatin for about 3 days following  treatment.    Labs from 02/12/2020 demonstrated total white blood cell count of 5.14, ANC of 3.06, hemoglobin of 11.2, platelets of 211,000.  She was found to be iron deficient last week and is intolerant to oral iron secondary to GI distress.  For this reason, she initiated IV iron therapy with Injectafer last week.  She had received her second dose 02/12/2020    Patient has normalized hemoglobin 12.2 on 2/26/2020.    She reported on 5/5/2020 she had a recent visit to the emergency department for what was suspected to be an allergic reaction.  She called our on-call service on Saturday with reports of hand and face swelling.  She was instructed to proceed to the emergency department at which time she was assessed and prescribed a Medrol Dosepak.  She had just completed her 5-FU and was unhooked on Friday, 5/3/2020.  Her symptoms have improved.  She does report persistent hyperpigmentation and mild swelling of the palms of the hands but this is much improved.  It was her right hand specifically that was swollen.  Her facial swelling has resolved.  She continues on Cefdinir nd since has 1 day remaining, she was prescribed cefdinir for a UTI requiring hospitalization at the end of April.  Reports no new symptoms.  Her labs are stable.  She is scheduled for treatment again.    Patient states at this time she is not tolerating her chemotherapy well.     Patient seen in the emergency department on 5/2/2020 for what was suspected to be an allergic reaction.  She called our on-call service on Saturday explaining that she was experiencing hand and face swelling.  She was instructed to proceed to the emergency department at which time she was assessed and prescribed a Medrol Dosepak.  She had just completed her 5-FU and was unhooked on Friday, 5/1/2020.      She reports since her ED visit on 5/2/2020 her symptoms have not improved. Her hands and feet were swollen upon presenting to the ED and she could barely make a fist.  She states that she feels so swollen she is not able to stand it. She states that her skin on the hands and feet are peeling extensively as well, besides changing color to more dark.     She also reports significant fatigue after her first week of the 5-FU treatment but she expected this side effect. She also notices significant increase in her neuropathy to the point that she is not able to even walk around in her home without her house slippers due to irritation from her rugs. She denies and associated nausea or vomiting at this time. She also has episodes of epistaxis every day after the chemotherapy cycle #7.  She does report working full-time during the week of chemotherapy.    Laboratory studies, 5/13/2020, show moderate thrombocytopenia platelets 123,000, low normal WBC 4140 including ANC 2720 and normal hemoglobin 13.4.  Because significant hand-foot syndrome, will decrease both 5-FU and oxaliplatin by 50%, and eliminate bolus dose of 5-FU.    On 5/21/2020 patient had a PET scan performed which showed no convincing evidence of residual disease in abdomen or pelvis, no metastatic disease within the chest or neck.     Cycle #8 FOLFOX 6 was given on 5/13/2020, with 50% dose reduction for both 5-FU and oxaliplatin, and also examination of bolus 5-FU.  She states on 5/27/2020 that with the recent reduction of the chemotherapy she feels significantly better. She has more energy and is able to do her daily routine.      PET scan examination on 5/21/2020 reported no evidence of metastatic disease in chest abdomen pelvis.  There was a stable 6 mm right upper lobe pulmonary nodule.    Laboratory studies on 5/27/2020 showed mild leukocytopenia WBC 3720 but a normal ANC 2250 and lymphocytes 630.  Normal platelets 163,000 and hemoglobin 12.6.  Chemistry lab reported normal renal function, liver function panel and a electrolytes, elevated glucose 164.    Laboratory studies 6/24/2020, showed normal hemoglobin 13.4 but  macrocytosis .9.  Normal platelets 210,000 and WBC 5870.  She had normal CMP.     Patient last time was here in late June 2020.  Since that time she had surgical procedure for low anterior colon resection, coloanal anastomosis on 8/3/2020.  She later developed a perirectal abscess, required surgical drainage on 8/14/2020.  She was discharged on 8/27/2020.    Patient reports to me she still has lower abdominal wall vacuum suction in place.  She denies fever sweating chills.  Performance status is ECOG 1.  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  She does have visiting nurse come to the home for wound care.    CT scan for chest abdomen pelvis on 9/9/2020 reported a new 8 mm noncalcified pulmonary nodule in the left lower lobe.  Otherwise stable right upper lobe 6 mm pulmonary nodule, and no evidence of disease recurrence in the abdomen or pelvis.     Laboratory study on 9/16/2020 reported elevated AST 55, ALT 95, alk phosphatase 143 but normal total bilirubin 0.3.  Chemistry lab otherwise unremarkable.  She has completed normal CBC.      Because of the abnormal CT scan examination for chest abdomen pelvis on 9/9/2020 reported a new 8 mm noncalcified pulmonary nodule in the left lower lobe, we obtained a PET scan examination for further evaluation, which was done on 9/18/2020.  This study reported several pulmonary nodules, some of them are hypermetabolic, newly developed compared to previous PET scan in May 2020.  Certainly does highly suspicious for metastatic disease.  There are also hypermetabolic activity in the abdomen and pelvic area which is hard to differentiate from surgical scar versus metastatic malignancy.    Laboratory study on 10/13/2020 reported normal CBC with Hb 13.9, platelets 302,000 and WBC 5520 including ANC 3830.  Chemistry lab reported normalized AST 20, alk phosphatase 116 improved ALT 35, and maintains normal bilirubin, with normal electrolytes and liver  function panel.     Patient was started on first cycle of palliative chemotherapy FOLFIRI on 10/13/2020.    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  Patient had a new port placed 12/18/2020, and had held her Xarelto for 2 days prior to surgery.  She presented to the ER on 12/21/20 with complaints of facial and arm swelling for 3 days.  She also noted increased shortness of breath.  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  Thrombus within the right internal jugular vein cannot be excluded.  Patient was started on IV heparin, and eventually transitioned to weight-based Lovenox, which she now continues.    PET scan examination on 9/24/2021 reported further shrinking of the tiny right upper lobe pulmonary nodule.  Otherwise no new lesions.  No evidence for metastatic disease in other areas.      Patient had CT scan for chest with IV contrast obtained on 12/14/2021 which reported small tiny stable pulmonary nodules. There is a 4 mm right upper lobe pulmonary nodule. There is also a stable 4 mm left lower lobe pulmonary nodule. There is also stable left upper lobe micronodule.     Laboratory studies today on 12/21/2021 reported normal hemoglobin 15.9 however .0, platelets 218,000 WBC 5360 including neutrophils 3730 lymphocytes 980. Chemistry lab reported normal renal function, electrolytes, glucose, and marginally elevated ALT 55, AST 34 but normal total bilirubin and alk phosphatase.       MEDICATIONS    Current Outpatient Medications:   •  clonazePAM (KlonoPIN) 1 MG tablet, TAKE 1 TABLET BY MOUTH 3 TIMES A DAY AS NEEDED FOR ANXIETY OR SEIZURES, Disp: 90 tablet, Rfl: 1  •  Cyanocobalamin (VITAMIN B-12 PO), Take  by mouth., Disp: , Rfl:   •  dicyclomine (BENTYL) 20 MG tablet, TAKE 1 TABLET BY MOUTH EVERY 6 HOURS AS  NEEDED, Disp: 360 tablet, Rfl: 0  •  diphenoxylate-atropine (LOMOTIL) 2.5-0.025 MG per tablet, Take 1 tablet by mouth 4 (Four) Times a Day As Needed for Diarrhea., Disp: 120 tablet, Rfl: 1  •  enoxaparin (LOVENOX) 100 MG/ML solution syringe, INJECT 0.9 ML UNDER THE SKIN INTO THE APPROPRIATE AREA AS DIRECTED EVERY 12 (TWELVE) HOURS., Disp: 60 each, Rfl: 2  •  escitalopram (LEXAPRO) 20 MG tablet, TAKE 1 TABLET BY MOUTH EVERY DAY, Disp: 90 tablet, Rfl: 3  •  famotidine (PEPCID) 20 MG tablet, TAKE 1 TABLET BY MOUTH TWICE A DAY, Disp: 180 tablet, Rfl: 1  •  fluorouracil in dextrose 5 % 250 mL infusion, Infuse  into a venous catheter 1 (One) Time. Takes twice a month., Disp: , Rfl:   •  Loratadine 10 MG capsule, Take 10 mg by mouth Every Evening., Disp: , Rfl:   •  metoprolol succinate XL (TOPROL-XL) 25 MG 24 hr tablet, Take 0.5 tablets by mouth Every Night., Disp: 45 tablet, Rfl: 3  •  mineral oil-hydrophilic petrolatum (AQUAPHOR) ointment, Apply 1 application topically to the appropriate area as directed As Needed for Dry Skin., Disp: , Rfl:   •  ondansetron (ZOFRAN) 8 MG tablet, TAKE 1 TABLET BY MOUTH 3 (THREE) TIMES A DAY AS NEEDED FOR NAUSEA OR VOMITING., Disp: 60 tablet, Rfl: 0  •  promethazine (PHENERGAN) 25 MG tablet, Take 1 tablet by mouth Every 6 (Six) Hours As Needed for Nausea or Vomiting., Disp: 60 tablet, Rfl: 2    ALLERGIES:   No Known Allergies    SOCIAL HISTORY:       Social History     Tobacco Use   • Smoking status: Current Every Day Smoker     Packs/day: 1.00     Years: 24.00     Pack years: 24.00     Types: Electronic Cigarette, Cigarettes   • Smokeless tobacco: Never Used   • Tobacco comment: 1 PPD   Vaping Use   • Vaping Use: Some days   • Substances: Nicotine   • Devices: Disposable   Substance Use Topics   • Alcohol use: Not Currently     Comment: Caffeine use rare   • Drug use: No         FAMILY HISTORY:   Mother has positive factor V Leiden mutation, although she did not have thrombosis, mother  "also is coronary disease with stenting, she also is occasional bruising.  Maternal grandmother had DVT, she had multiple surgical procedures.  Patient mother's half-brother had metastatic colon cancer at diagnosis in his 50s.  Maternal great aunt has breast cancer.  Patient will follow his skin cancer in his 60s, exclusive.         Vitals:    04/26/22 0747   BP: 124/77   Pulse: 88   Resp: 16   Temp: 97.5 °F (36.4 °C)   TempSrc: Temporal   SpO2: 95%   Weight: 96.3 kg (212 lb 4.8 oz)   Height: 165.1 cm (65\")   PainSc: 0-No pain     Current Status 4/26/2022   ECOG score 0     Physical Exam  Vitals reviewed.   Constitutional:       General: She is not in acute distress.     Appearance: Normal appearance. She is well-developed.   HENT:      Head: Normocephalic and atraumatic.   Eyes:      Pupils: Pupils are equal, round, and reactive to light.   Cardiovascular:      Rate and Rhythm: Normal rate and regular rhythm.      Heart sounds: Normal heart sounds. No murmur heard.  Pulmonary:      Effort: Pulmonary effort is normal. No respiratory distress.      Breath sounds: Normal breath sounds.   Abdominal:      General: Bowel sounds are normal.      Palpations: Abdomen is soft.      Tenderness: There is no abdominal tenderness.      Comments: Ostomy in right abdomen. Scattered bruises on the abdomen bilaterally from Lovenox injections.   Musculoskeletal:         General: Swelling (Trace edema in both ankles) present.      Cervical back: Normal range of motion.   Skin:     General: Skin is warm and dry.      Findings: Bruising present. No erythema or lesion.      Comments: Left lower abdomen wall abscess has resolved.  No signs of infection locally.   Neurological:      Mental Status: She is alert and oriented to person, place, and time. Mental status is at baseline.   Psychiatric:         Mood and Affect: Mood normal.         Thought Content: Thought content normal.       RECENT LABS:      Lab Results   Component Value Date    " IRON 43 03/15/2022    TIBC 396 03/15/2022    FERRITIN 129.50 03/15/2022   Iron saturation 11%    Lab Results   Component Value Date    CEA 0.83 03/15/2022       Results from last 7 days   Lab Units 04/26/22  0734   WBC 10*3/mm3 5.92   NEUTROS ABS 10*3/mm3 4.09   HEMOGLOBIN g/dL 14.8   HEMATOCRIT % 45.5   PLATELETS 10*3/mm3 176               IMAGING:      Assessment/Plan      ASSESSMENT:   1.  Metastatic rectal cancer.   · Initial rectal ultrasound examination reported T3N0 disease without lymphadenopathy.   · CT scan of chest, abdomen and pelvis reported no lymphadenopathy in the abdomen, pelvis or chest. She does have small pulmonary nodule 6-7 mm and 2 mm with indeterminate feature. There is no solid evidence of metastatic disease otherwise.   · Based on the CT scan and rectal ultrasound imaging studies, this patient had stage IIA (T3N0M0) disease.   · She had PET scan examination on 8/8/2019 which reported a hypermetabolic right upper lobe pulmonary nodule 6 mm with SUV 5.6.  This is suspicious for metastatic disease however it is too small to be biopsied.  This patient may have stage IV disease.   · She initiated concurrent radiation with continuous 5-FU on 8/12/2019.  · Patient finished concurrent chemoradiation therapy.  · Patient underwent surgical resection of the rectal tumor and diverting loop ileostomy on 12/2/2019 with Dr. Ye.  Pathology evaluation reported residual T3 disease, with 1 out of 14 lymph nodes positive for malignancy.  Certainly this patient has at least stage IIIb rectal cancer (T3 N1 M0?)  · On 1/22/2020 adjuvant chemotherapy FOLFOX 6 regimen initiated.   · On 2/5/2022 cycle #2 was delayed secondary to neutropenia.  She was given 3 days of Granix.   · Emend added with cycle 3.  With improved nausea control  · Continuing home Granix x3 days following 5-FU disconnect  · 3/11/2020 due for cycle 4, however, she is experiencing progressive abdominal pain and occasional fevers.   · CT scan  performed on 3/13/2020 reveals no evidence of progressive or recurrent disease.  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  · Patient hospitalized 4/24-4/26/20 after cycle 5 of chemotherapy with acute UTI.  CT abdomen/pelvis noting fluid collection in the presacral region having diminished in size compared to CT dated 3/13/2020.  Patient was evaluated by Dr. Ye while in the hospital with further plans to evaluate possible colovaginal fistula following completion of chemotherapy.  Patient did respond to IV antibiotics and discharged home on oral cefdinir.  · 4/29/2020 cycle 6 of FOLFOX.  Urinary symptoms have resolved   · Patient seen in the [facility] ED on 5/2/2020 for what was suspected to be an allergic reaction.  She called our service on Saturday explaining that she was experiencing hand and face swelling.  She was instructed to proceed to the emergency department at which time she was assessed and prescribed a Medrol Dosepak.  She had just completed her 5-FU and was unhooked on Friday, 5/1/2020.  Her symptoms have resolved in the office on assessment, 5/5/2020.  · The patient had grade 3 hand-foot syndrome based on symptomology.  Patient had cycle #8 of 5-FU on 5/13/2020. Due to her symptoms and poor tolerance to the 5-FU, her treatment dose will be reduced 50% for oxaliplatin and infusional 5-FU.  Bolus 5-FU will be discontinued..  · On 5/21/2020 patient had a PET scan and it showed no evidence of of metastatic disease in the neck, chest, abdomen or pelvis.  There was fluid accumulation/abscess.   · On 5/27/2020 discussed with the patient that we can discontinue chemotherapy at this time.  She will follow-up with Dr. Ye to discuss a possibility to reverse the ileostomy.  We likely will obtain anther PET scan in 3 to 4 months for reassessment.    · Patient was seen by Dr. Ye on 6/19/2019 for evaluation and to discuss possible take down of her ileostomy.  She is scheduled to have a gastrografin enema  on 7/2/2020 to evaluate for a fistula, and then a colonoscopy on 7/15/2020, both done by Dr. Ye.  She states that based on the above imaging and procedure findings, she may have a more extensive surgery done or just have her ileostomy reversed, which would likely be done in August 2020.  This will all be coordinated under the care of Dr. Ye.     · CT scan from 09/09/2020 and also compared to her previous PET scan examination from 05/21/2020 and also the original PET scan from 08/09/2019.  The original PET scan there is a small right upper lobe pulmonary nodule 6 mm with SUV 5.6. So in the 05/2020 PET scan the nodule is still there but activity seems much less and this most recent CT scan examination also documented the preexisting small pulmonary nodule however there is a new left lower lobe pulmonary nodule 8 mm in size and I shared with the patient today this nodule is suspicious for metastatic disease. Laboratory studies reported minimal CEA level 1.32 on 09/09/2020. Liver function panel was only marginally abnormal with the ALT 45, the remaining is normal. However laboratory study today showed worsening AST 55, ALT 95 and alkaline phosphatase 143 but is still normal, total bilirubin 0.3.   · Recommended to have repeat PET scan examination for assessment because the left lower lobe pulmonary nodule is too small to be biopsied, and if the PET scan reports hypermetabolic lesion, I recommended to have stereotactic radiation therapy. Explained to patient that she is not a really good candidate to have thoracotomy for resection because she still has unhealed wound in the abdomen. Stereotactic radiation therapy will likely be a more feasible choice.   · PET scan examination obtained on 9/18/2020 showed metastatic disease.  It reported a few hypermetabolic pulmonary nodules, and some new small pulmonary nodules, all of them highly suspicious for metastatic disease.  She also has hypermetabolic activity in the  abdomen and pelvic area which could be related to scar tissue from her recent surgery versus metastatic disease.  Nevertheless overall picture fits with metastatic cancer.  · Discussed with the patient on 9/20/2020, we reviewed the PET scan images together, and I recommended to have systematic chemotherapy, I do not think stereotactic reading therapy to the hypermetabolic lesions in the lungs warranted at this time because even those will be treated with reading therapy, she will still need systematic chemotherapy.  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  I did discuss with the patient using anti-EGFR monoclonal antibody versus anti-VEGF monoclonal antibody.     · Palliative chemotherapy cycle#1 FOLFIRI was started on 10/13/2020.  No bolus 5-FU given considering previous poor tolerance.    · NGS study from [facility] reported positive for [place] mutation.  Microsatellite stable, mutation burden 5/Mb.   · Discussed with the patient 10/27/2020, because of the [place] mutation, she is not a candidate for anti-EGFR monoclonal antibody such as Erbitux or vectibix.  She could be a candidate for anti-VEGF monoclonal antibody, however because of active wound, she is not a candidate right now at this moment.  · Patient seen in the ED for acute right neck pain on 11/16/2020.  CT angiogram as well as CT of the cervical spine performed with no acute findings but notably one of her pulmonary nodules, left upper lobe, somewhat decreased in size.  Patient given prednisone pack.  Further details below.  · Cycle #6 chemotherapy will be resumed on 1/5/2021.  Started back on original irinotecan.  · PET scan examination obtained on 1/12/2021 after cycle #6 chemotherapy reported improved pulmonary nodule hypermetabolic activity.  Confirmed these are metastatic lesions.  · Patient is evaluated 1/19/2020, will continue cycle #7 FOLFIRI chemotherapy.  However Avastin will be on hold see next  section.   · Patient was reevaluated on 2/2/2021, will continue chemotherapy cycle #8 FOLFIRI.  Avastin / biosimilar will be added.    · She talked to [facility] specialist in mid February 2021, and had recommended palliative chemotherapy without surgical intervention of metastatic lung lesions.   · On 3/2/2021, patient will proceed with cycle #10 FOLFIRI plus bevacizumab.  After 12 cycles, plan to start maintenance treatment.  · 3/16/2021 cycle 11.  Plus bevacizumab.  · 3/30/2021 cycle 12 FOLFIRI plus bevacizumab.  Having issues with worsening nausea despite premeds with dexamethasone, Aloxi, Emend, and Zofran at home.  Patient is requesting a dose of Phenergan, which is helped her in the past.  We will give this to her with treatment today.  · PET scan examination on 4/6/2021 reported no evidence of hypermetabolic metastatic lesion.  · Discussed with the patient on 4/13/2021, we reviewed the PET scan results.  We recommended to switch to maintenance chemotherapy without irinotecan.  We will continue 5-FU and Avastin every 2 weeks.  We discussed possibility of switching to oral Xeloda treatment.  Discussed with the patient for side effects more so with hand-foot syndrome.  Patient is agreeable.   · Xeloda 2000 mg twice daily 7 days on, 7 days off along with Avastin initiated 4/27/2021.  · After only 5 doses of Xeloda significant hand-foot syndrome, Xeloda held. Patient requesting to be transitioned back to infusional 5-FU, as she felt that she tolerated infusional 5-FU without any problem.  The patient will receive 5-FU leucovorin and Avastin every 2 weeks.  Xeloda discontinued.  · 5/25/2021 continued cycle #4 maintenance 5-FU, leucovorin, Avastin tolerating this much better so far, we will continue this. Recommended to have 12 cycles of maintenance chemotherapy, then obtain PET scan for reevaluation.  We could discuss further treatment plan at that time.  · 9/14/2021 patient due for  cycle 12 of additional maintenance 5-FU/leucovorin plus Avastin.  She continues to tolerate treatment well overall.  She is anxious in the office today with her Mediport not getting blood at first and also with upcoming scans being heavy on her mind.  She was instructed to take one of her Klonopin pills while here to help calm her mind.  Heart rate did improve from 140s down to 112.  Proceed with treatment today as scheduled.  · PET scan examination on 9/24/2021 reported further shrinking of the right upper lobe tiny pulmonary nodule.  No other new lesions.  · Discussed with patient on 9/24/2021 about further shrinking of the right upper lobe pulmonary nodule and no evidence for other new lesions. We recommended to have chemo break for 3 months with repeated CT scan for reevaluation.  · Recent CT scan for chest 12/14/2021 and abdomen CT from 11/29/2021 reported no evidence for active disease. The right upper lobe pulmonary nodule, left lower lobe pulmonary nodule minimal about 4 mm and stable. I discussed with patient today on 12/21/2021, recommended to give her another 3 months chemotherapy holiday and obtain CT scan afterwards for reevaluation. After discussion, patient is agreeable.   · CT scan examination for chest abdomen and pelvis obtained on 3/15/2022 reported a slight increase of the left upper lobe pulmonary nodule 5 mm, from previous 3 mm.  The other pulmonary nodules were stable in size.  There is also small left upper lobe groundglass changes.   · I reviewed images studies with the patient today on 3/23/2022, compared to multiple previous images including CT chest CT and PET scan, suspected disease progression.  Discussed with the patient, and I recommended to resume maintenance chemotherapy with 5-FU plus leucovorin plus Avastin repeating every 2 weeks, and obtaining CT scan for reassessment in 3 months.  Patient is agreeable.  · Patient resumed maintenance chemotherapy on 3/29/2022 with 5-FU  leucovorin and Avastin.   · 4/26/2022, proceed with cycle #27 maintenance 5-FU, leucovorin, and Avastin.  Patient continues to tolerate treatment well.        2.   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  Low-dose Lovenox discontinued and initiated Xarelto 20 mg daily.  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  · Patient held Xarelto for 2 days prior to MediPort removal from the left chest wall and placement of new port in the right chest wall on 12/18/2020.  She resumed Xarelto that evening.  · Progressive swelling in the neck, face, upper extremities prompting hospitalization with CT scan showing thrombosis in the right subclavian vein and SVC.  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  Although she had been off of Xarelto for a few days, clearly Xarelto was not prohibiting progression of her thrombosis after she resumed treatment and there was some evidence to suggest thrombosis had been present at least in the innominate vein on prior scan in September but now appears chronic.  Current acute thrombosis involving SVC and right subclavian.  · Patient was admitted and placed on heparin drip  · Patient was evaluated by vascular surgery and intervention was not recommended for thrombolysis/thrombectomy.  · On 12/22/2020, the patient developed worsening shortness of breath, increasing upper extremity edema consistent with worsening  SVC syndrome.  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  · Symptoms improved and patient was discharged 12/24/2020 on Lovenox 100 mg every 12 hours.    · Returns 12/29/2020 for evaluation continuing Lovenox, overall tolerating it well.  No bleeding issues.  · Improved edema 1/5/2021.  Will continue Lovenox weight-based every 12 hours.  · On 1/19/2021 and 2/2/2021, patient reports improved facial swelling.  She however does have being bruise on her abdomen wall where she does Lovenox injection.  However she does not have a spontaneous epistaxis, gum bleeding or other bleeding.   · On 2/2/2021, we discussed that side effects of Avastin/biosimilar related to thrombosis.  Since this patient already has been on Lovenox, I think the benefits from treatment for her cancer will outweigh that risk of thrombosis, will proceed ahead with Avastin.  I cautioned patient to watch out for signs of worsening thrombosis patient voiced understanding.   · On 3/16/2021, no evidence of worsening DVT, continue Lovenox.  · 5/11/2021 Lovenox dosing will be adjusted due to patient's weight loss.  We discussed she needs 1 mg/kg.  Her syringes are 100 mg syringes she will do 0.9 mL subcu every 12 hours.  · 8/3/2021 Patient continues to have bruising on abdomen from lovenox injections.  Will need to monitor weight and adjust dosing in future if further weight loss.   · Stable condition on 9/28/2021.  Continue Lovenox anticoagulation.    · Patient reports no chest pain no dyspnea no leg edema. However she had bruising and also most recently abnormal small abscess for which she actually went to ER on 11/29/2021, had I&D. The wound is healing. No further drainage. Denies fever sweating or chills. After discussion, she will continue Lovenox injection for now.   · On 3/23/2022, patient reports good tolerance of Lovenox.   Nevertheless she still has small quantity of pus in the left lower abdomen abscess, she squeezes it every day to prevent it became worse.  She reports no fever sweating chills.  Recommended to start Augmentin 875 mg twice a day for 7 days.  She will continue Lovenox indefinitely.  · On 4/12/2022, patient reports resolution of the small abscess at left lower abdominal wall.   · 4/26/2022, patient continues on Lovenox indefinitely.     3.  This patient also has strong family history for malignancy the patient had appointment with genetic counseling on September 3, 2019.  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    4.  Mild anemia, improved since her surgery.    · She also has a history of iron deficiency.  Iron studies reveal iron saturation of 10%.  She was started on oral iron but was unable to tolerate due to GI side effects.   · Status post Injectafer 2/5/2020 and 2/12/2020   · Improved hemoglobin 13.5 on 1/5/2021.  · 3/16/2020 when hemoglobin now up to 16.2, hematocrit 47.6.  Patient admits that she has started smoking again, and I have encouraged her to quit.  She denies any snoring or sleep apnea diagnosis.  Patient is not taking oral iron.  We will closely monitor.  · 3/30/2020 hemoglobin 15.7, HCT 47.5.  Patient states that she has cut back significantly on her smoking, although not quit yet.  I have encouraged her to continue working on this.  We will continue to closely monitor.  · Hemoglobin 14.6 on 6/8/2021 at the meantime he has worsening macrocytosis .6.    · Laboratory studies 6/22/2021 reported excellent folate > 20 ng/mL, however low normal B12 level 357 pg/mL.    · On 7/6/2021, normal hemoglobin 15.8, .9 and HCT 47.2%.  Patient was asked to start oral vitamin B12 at 1000 mcg daily.   · 9/14/2021 hemoglobin 17.0, hematocrit 52.1.  Monitor.  · On 9/28/2021 hemoglobin 16.1 hematocrit 48.5%, continue to monitor.   · Lab study on 12/14/2021 reported excellent ferritin 296 and iron  saturation 25% with free iron 104 TIBC 424. Hemoglobin was 15.2 with .2.   · Normal hemoglobin 14.6 on 3/15/2022.  Iron study reported normal ferritin 129.5 ng/mL however slightly low iron saturation 11%.  Continue to monitor for now.  · 4/26/2022, hemoglobin today 14.8.    5.  Peripheral neuropathy secondary to chemotherapy.   · This is mild involving bilateral hands and feet as reported on 9/16/2020.   · Will avoid chemotherapy with oxaliplatin.  · Stable mild neuropathy as of 11/10/2020  · Patient reports worsening peripheral neuropathy and cycle #5 chemotherapy on 12/8/2020 with and without irinotecan.   · On 1/5/2021, patient reports improvement of peripheral neuropathy, will add irinotecan back to chemotherapy.  Continue to monitor and adjust medication.  · On 3/2/2021, patient reports no worsening of peripheral neuropathy after restarting irinotecan.   · This remains stable, may be slightly better as reported on 3/23/2022..   · No worsening since resuming chemotherapy on 3/29/2022.  · 4/26/2022 peripheral neuropathy remains stable today.    6.  History of hand-foot syndrome grade 3.    · It become so significant after cycle #7 FOLFOX treatment.  Discussed with patient on 5/13/2020, will discontinue the bolus 5-FU dose, and also decrease 50% of the infusion dose through the pump.   · We also use Medrol Dosepak for possible recurrent symptomology.  She responded this well.   · Her hand-foot syndrome improved.  · Under current treatment with FOLFIRI, patient not receiving 5-FU bolus.  No recurrent issues.   · Patient will be switched to maintenance chemotherapy using Xeloda in late April 2021.  · Following 5 doses of Xeloda, significant hand-foot syndrome with pain in the feet, significant erythema.  Xeloda held.  Will be further discussed with Dr. Nguyen to determine if the patient will resume 5-FU versus a dose reduction to Xeloda.  · Aggressive emollient moisturizer use twice daily for the past week and  patient reports improvement in the dryness and erythema.  Xeloda will now be discontinued and patient will switch back to 5-FU.  · As of 5/25/2021 dryness and erythema/hyperpigmentation continues to improve.  Xeloda has been discontinued.  · 6/8/2021, patient reports much improved hand-foot syndrome, with remnant pigmentation on palms.  · On 7/6/2021, patient reports and had a some flareup of hand-foot syndrome, however improved after aggressive moisturizing cream and the urea cream.  Overall much tolerated infusional 5-FU compared to Xeloda.    · 7/20/2021 continues to have mild hyperpigmentation of her hands and feet bilaterally, she continues aggressive moisturization with utter balm with urea.  She also reports some soreness of her tailbone, and we discussed that this is likely due to loss of weight and muscle mass.  I advised her to go ahead and apply moisturizer to this area as there is one tiny fissure.  Also recommended using a waffle pad or doughnut to sit on.  · 8/3/2021 patient reports her hand foot symptoms are stable and without worsening.  Continue to monitor.  · Symptoms stable, typically flaring about 24 hours after she is unhooked from her 5-FU infusional ball and then settling down with some mild peeling.   · Since off chemotherapy no specific complaints.   · No recurrent symptoms after resuming chemotherapy on 3/29/2022.    7.  Hyperlacrimation and mild scleral irritation related to 5-FU.  She has been taking steroid eyedrops.  This has improved her hyperlacrimation.  · Not currently an issue.     8.  Abnormal liver function panel.  · Overall LFTs have improved which is mild ongoing elevation of AST and ALT.  Continue to monitor.    · Slightly worse AST 43 ALT 84 on 9/28/2021.  Continue to monitor.  · Slightly elevated AST 34 ALT 55 on 12/21/2021. Continue to monitor.    · Normal liver function panel on 3/15/2022 and on 4/12/2022. 0.70    9.  Depression.  Patient seen by JULIET Davenport on  11/9/2020.  She was started on mirtazapine.  Lexapro was discontinued.  This has definitely improved her mood with the patient feeling overall much better.  She is sleeping better.  Appetite is improved with her actually eating more than she wants to.    · Condition is stable.  She plans to continue follow-up with supportive oncology.      10.  Proteinuria: 3/30/2020: Patient is 2+ protein in her urine.  We will continue with Avastin and closely monitor.  No need for 24-hour urine at this time.  · Persistent 2+ proteinuria on 4/13/2021.  continue to monitor.  · 5/25/2021 protein urine only 1+.    · 6/22/2021 persistent 1+ proteinuria.  Continue to monitor.  · 7/6/2021, trace protein.  Continue Avastin treatment and monitoring.  · 8/3/2021 1+ protein, stable from last cycle.   · On 8/17/2021, urine protein 2+.  She also had a 1+ leukocytes.  Patient is not symptomatic such as fever, dysuria or gross hematuria, however urine culture obtained, with >100,000 E. Faecalis. Patient treated with Levaquin.   No active problem currently.  Negative urine protein today.    11.  Tachycardia:   · Patient was seen by Dr. Bustos cardiologist on 4/6/2021.   · On 9/14/2021 patient more tachycardic in the setting of anxiety.  Improved with Klonopin.  · 4/26/2022 heart rate today 88.    12.  Hypertension.  · /91 at visit 8/17/2021.  She was given clonidine for treatment that day and also prescribed lisinopril 10 mg daily.    · Patient unable to tolerate lisinopril, noting that her blood pressure bottomed out with systolic of barely 80 and feeling very fatigued and wiped out.  She has been checking her blood pressure off the lisinopril with systolics in the 120s to 130s at home and diastolic in the 80s to 90s.   · She remains off antihypertensives.  Blood pressure today 124/77.    PLAN:   1. Proceed today with cycle #27 maintenance 5-FU, leucovorin, and Avastin.  This will be continued every 2 weeks.  2. Continue Lovenox 1  mg/kg twice daily.  3. Continue oral B12 at 1000 mcg daily.  4. Return in 2 weeks for MD follow-up prior to cycle #28 maintenance chemotherapy.    The patient is on high risk medication that requires close monitoring for toxicity.    Clari Charles, JULIET  04/26/22      CC:  Deepika Vela III NP-MD Perla Rahman MD

## 2022-04-22 ENCOUNTER — OFFICE VISIT (OUTPATIENT)
Dept: INTERNAL MEDICINE | Facility: CLINIC | Age: 42
End: 2022-04-22

## 2022-04-22 VITALS
OXYGEN SATURATION: 97 % | TEMPERATURE: 97.7 F | SYSTOLIC BLOOD PRESSURE: 124 MMHG | HEIGHT: 65 IN | HEART RATE: 93 BPM | DIASTOLIC BLOOD PRESSURE: 78 MMHG | BODY MASS INDEX: 35.35 KG/M2 | WEIGHT: 212.2 LBS

## 2022-04-22 DIAGNOSIS — K59.1 FUNCTIONAL DIARRHEA: ICD-10-CM

## 2022-04-22 DIAGNOSIS — F41.9 ANXIETY: Chronic | ICD-10-CM

## 2022-04-22 DIAGNOSIS — I10 PRIMARY HYPERTENSION: Chronic | ICD-10-CM

## 2022-04-22 DIAGNOSIS — D68.51 HETEROZYGOUS FACTOR V LEIDEN MUTATION: Primary | Chronic | ICD-10-CM

## 2022-04-22 DIAGNOSIS — K52.1 CHEMOTHERAPY INDUCED DIARRHEA: ICD-10-CM

## 2022-04-22 DIAGNOSIS — T45.1X5A CHEMOTHERAPY INDUCED DIARRHEA: ICD-10-CM

## 2022-04-22 DIAGNOSIS — F33.9 MONOPOLAR DEPRESSION: Chronic | ICD-10-CM

## 2022-04-22 PROBLEM — E87.1 HYPONATREMIA: Status: RESOLVED | Noted: 2020-12-23 | Resolved: 2022-04-22

## 2022-04-22 PROCEDURE — 99214 OFFICE O/P EST MOD 30 MIN: CPT | Performed by: NURSE PRACTITIONER

## 2022-04-22 RX ORDER — DIPHENOXYLATE HYDROCHLORIDE AND ATROPINE SULFATE 2.5; .025 MG/1; MG/1
1 TABLET ORAL 4 TIMES DAILY PRN
Qty: 120 TABLET | Refills: 1 | Status: SHIPPED | OUTPATIENT
Start: 2022-04-22 | End: 2022-07-07

## 2022-04-22 NOTE — ASSESSMENT & PLAN NOTE
Hypertension is improving with treatment.  Continue current treatment regimen.  Blood pressure will be reassessed at the next regular appointment.    Continues Toprol for tachycardia 2/2 chemo.

## 2022-04-22 NOTE — ASSESSMENT & PLAN NOTE
Patient's depression is controlled.   Continue lexapro and PRN klonopin.     Good mood today.   Planning trip to [place] in the fall.

## 2022-04-22 NOTE — PROGRESS NOTES
Chief Complaint  Depression and Hypertension (3 month follow up )     Subjective:      History of Present Illness {CC  Problem List  Visit  Diagnosis   Encounters  Notes  Medications  Labs  Result Review Imaging  Media :23}     Carole Weaver presents to [facility] for follow up:     Colon cancer: She had been off chemo.   CT on 3/15//22: slightly increase size in pulmonary nodule.    Started back on maintenacne chemotherapy on 3/29/22 and will continue every 2 weeks.   States tolerating well.     She needs a refill for lomotil.     Anxiety: 2/2 medical condition.  States controlled at this time.  Klonopin as needed. Continues routine lexapro.       I have reviewed patient's medical history, any new submitted information provided by patient or medical assistant and updated medical record.      Objective:      Physical Exam  Vitals reviewed.   Constitutional:       Appearance: Normal appearance. She is well-developed.   HENT:      Head:      Comments: Wearing mask due to COVID   Neck:      Thyroid: No thyromegaly.   Cardiovascular:      Rate and Rhythm: Normal rate and regular rhythm.      Pulses: Normal pulses.      Heart sounds: Normal heart sounds.   Pulmonary:      Effort: Pulmonary effort is normal.      Breath sounds: Normal breath sounds.      Comments: E/U   Neurological:      Mental Status: She is alert and oriented to person, place, and time.   Psychiatric:         Mood and Affect: Mood normal.         Behavior: Behavior normal. Behavior is cooperative.         Thought Content: Thought content normal.         Judgment: Judgment normal.        Result Review  Data Reviewed:{ Labs  Result Review  Imaging  Med Tab  Media :23}     The following data was reviewed by: Deepika Vela III, NP-C on 04/22/2022  Common labs    Common Labsle 3/15/22 3/15/22 3/15/22 3/29/22 3/29/22 4/12/22 4/12/22    0825 0825 0909 [id number] 0834 0834   Glucose  110   118   "109   BUN  11   12  12   Creatinine  0.77 0.70  0.71  0.81   Sodium  140   139  139   Potassium  4.0   4.2  4.4   Chloride  107   107  105   Calcium  8.7   10.2  9.6   Albumin  3.70   3.60  3.90   Total Bilirubin  <0.2 (A)   <0.2 (A)  0.2   Alkaline Phosphatase  85   91  93   AST (SGOT)  17   22  14   ALT (SGPT)  29   30  24   WBC 4.50   6.21  6.75    Hemoglobin 14.6   14.7  15.6    Hematocrit 44.6   45.5  48.1 (A)    Platelets 168   228  188    (A) Abnormal value       Comments are available for some flowsheets but are not being displayed.                  Vital Signs:   /78 (BP Location: Left arm, Patient Position: Sitting, Cuff Size: Adult)   Pulse 93   Temp 97.7 °F (36.5 °C) (Temporal)   Ht 165.1 cm (65\")   Wt 96.3 kg (212 lb 3.2 oz)   SpO2 97%   BMI 35.31 kg/m²         Requested Prescriptions     Signed Prescriptions Disp Refills   • diphenoxylate-atropine (LOMOTIL) 2.5-0.025 MG per tablet 120 tablet 1     Sig: Take 1 tablet by mouth 4 (Four) Times a Day As Needed for Diarrhea.       Routine medications provided by this office will also be refilled via pharmacy request.       Current Outpatient Medications:   •  clonazePAM (KlonoPIN) 1 MG tablet, TAKE 1 TABLET BY MOUTH 3 TIMES A DAY AS NEEDED FOR ANXIETY OR SEIZURES, Disp: 90 tablet, Rfl: 1  •  Cyanocobalamin (VITAMIN B-12 PO), Take  by mouth., Disp: , Rfl:   •  dicyclomine (BENTYL) 20 MG tablet, TAKE 1 TABLET BY MOUTH EVERY 6 HOURS AS NEEDED, Disp: 360 tablet, Rfl: 0  •  diphenoxylate-atropine (LOMOTIL) 2.5-0.025 MG per tablet, Take 1 tablet by mouth 4 (Four) Times a Day As Needed for Diarrhea., Disp: 120 tablet, Rfl: 1  •  enoxaparin (LOVENOX) 100 MG/ML solution syringe, INJECT 0.9 ML UNDER THE SKIN INTO THE APPROPRIATE AREA AS DIRECTED EVERY 12 (TWELVE) HOURS., Disp: 60 each, Rfl: 2  •  escitalopram (LEXAPRO) 20 MG tablet, TAKE 1 TABLET BY MOUTH EVERY DAY, Disp: 90 tablet, Rfl: 3  •  famotidine (PEPCID) 20 MG tablet, TAKE 1 TABLET BY MOUTH TWICE " A DAY, Disp: 180 tablet, Rfl: 1  •  fluorouracil in dextrose 5 % 250 mL infusion, Infuse  into a venous catheter 1 (One) Time. Takes twice a month., Disp: , Rfl:   •  Loratadine 10 MG capsule, Take 10 mg by mouth Every Evening., Disp: , Rfl:   •  metoprolol succinate XL (TOPROL-XL) 25 MG 24 hr tablet, Take 0.5 tablets by mouth Every Night., Disp: 45 tablet, Rfl: 3  •  mineral oil-hydrophilic petrolatum (AQUAPHOR) ointment, Apply 1 application topically to the appropriate area as directed As Needed for Dry Skin., Disp: , Rfl:   •  ondansetron (ZOFRAN) 8 MG tablet, TAKE 1 TABLET BY MOUTH 3 (THREE) TIMES A DAY AS NEEDED FOR NAUSEA OR VOMITING., Disp: 60 tablet, Rfl: 0  •  promethazine (PHENERGAN) 25 MG tablet, Take 1 tablet by mouth Every 6 (Six) Hours As Needed for Nausea or Vomiting., Disp: 60 tablet, Rfl: 2     Assessment and Plan:      Assessment and Plan {CC Problem List  Visit Diagnosis  ROS  Review (Popup)  Health Maintenance  Quality  BestPractice  Medications  SmartSets  SnapShot Encounters  Media :23}     Problem List Items Addressed This Visit        Cardiac and Vasculature    HTN (hypertension) (Chronic)    Overview     Lisinopril 10 mg too much. (weakness, dizziness)     Continue to monitor - if need to restart, advised lower dose.             Current Assessment & Plan     Hypertension is improving with treatment.  Continue current treatment regimen.  Blood pressure will be reassessed at the next regular appointment.    Continues Toprol for tachycardia 2/2 chemo.               Coag and Thromboembolic    Heterozygous factor V Leiden mutation (HCC) - Primary (Chronic)       Gastrointestinal Abdominal     Functional diarrhea    Chemotherapy induced diarrhea    Relevant Medications    diphenoxylate-atropine (LOMOTIL) 2.5-0.025 MG per tablet       Mental Health    Anxiety (Chronic)    Monopolar depression (HCC) (Chronic)    Current Assessment & Plan     Patient's depression is controlled.   Continue  lexapro and PRN klonopin.     Good mood today.   Planning trip to [place] in the fall.                RIKI-shelby report is reviewed:    I reviewed the document in the electronic form in the Epic EMR.    On review, prescriptions filled under controlled contract with this office are consistent with what is prescribed.   The patient has been using the same pharmacy.   There is not concern for aberrant behavior based on this ekasper review.    Follow Up {Instructions Charge Capture  Follow-up Communications :23}     Return in about 3 months (around 7/22/2022).      Patient was given instructions and counseling regarding her condition or for health maintenance advice. Please see specific information pulled into the AVS if appropriate.    Dragon disclaimer:   Much of this encounter note is an electronic transcription/translation of spoken language to printed text. The electronic translation of spoken language may permit erroneous, or at times, nonsensical words or phrases to be inadvertently transcribed; Although I have reviewed the note for such errors, some may still exist.     Additional Patient Counseling:       There are no Patient Instructions on file for this visit.

## 2022-04-26 ENCOUNTER — OFFICE VISIT (OUTPATIENT)
Dept: ONCOLOGY | Facility: CLINIC | Age: 42
End: 2022-04-26

## 2022-04-26 ENCOUNTER — INFUSION (OUTPATIENT)
Dept: ONCOLOGY | Facility: HOSPITAL | Age: 42
End: 2022-04-26

## 2022-04-26 VITALS
HEIGHT: 65 IN | SYSTOLIC BLOOD PRESSURE: 124 MMHG | OXYGEN SATURATION: 95 % | BODY MASS INDEX: 35.37 KG/M2 | DIASTOLIC BLOOD PRESSURE: 77 MMHG | TEMPERATURE: 97.5 F | WEIGHT: 212.3 LBS | RESPIRATION RATE: 16 BRPM | HEART RATE: 88 BPM

## 2022-04-26 DIAGNOSIS — C20 ADENOCARCINOMA OF RECTUM: Primary | ICD-10-CM

## 2022-04-26 DIAGNOSIS — C20 ADENOCARCINOMA OF RECTUM: ICD-10-CM

## 2022-04-26 DIAGNOSIS — Z79.01 CHRONIC ANTICOAGULATION: ICD-10-CM

## 2022-04-26 DIAGNOSIS — G62.0 DRUG-INDUCED PERIPHERAL NEUROPATHY: ICD-10-CM

## 2022-04-26 DIAGNOSIS — Z79.899 HIGH RISK MEDICATION USE: ICD-10-CM

## 2022-04-26 DIAGNOSIS — D68.51 HETEROZYGOUS FACTOR V LEIDEN MUTATION: ICD-10-CM

## 2022-04-26 LAB
ALBUMIN SERPL-MCNC: 3.8 G/DL (ref 3.5–5.2)
ALBUMIN/GLOB SERPL: 1.5 G/DL
ALP SERPL-CCNC: 90 U/L (ref 39–117)
ALT SERPL W P-5'-P-CCNC: 33 U/L (ref 1–33)
ANION GAP SERPL CALCULATED.3IONS-SCNC: 10 MMOL/L (ref 5–15)
AST SERPL-CCNC: 22 U/L (ref 1–32)
BASOPHILS # BLD AUTO: 0.02 10*3/MM3 (ref 0–0.2)
BASOPHILS NFR BLD AUTO: 0.3 % (ref 0–1.5)
BILIRUB SERPL-MCNC: <0.2 MG/DL (ref 0–1.2)
BILIRUB UR QL STRIP: NEGATIVE
BUN SERPL-MCNC: 11 MG/DL (ref 6–20)
BUN/CREAT SERPL: 15.7 (ref 7–25)
CALCIUM SPEC-SCNC: 8.9 MG/DL (ref 8.6–10.5)
CHLORIDE SERPL-SCNC: 107 MMOL/L (ref 98–107)
CLARITY UR: CLEAR
CO2 SERPL-SCNC: 23 MMOL/L (ref 22–29)
COLOR UR: YELLOW
CREAT SERPL-MCNC: 0.7 MG/DL (ref 0.57–1)
DEPRECATED RDW RBC AUTO: 50.2 FL (ref 37–54)
EGFRCR SERPLBLD CKD-EPI 2021: 110.9 ML/MIN/1.73
EOSINOPHIL # BLD AUTO: 0.15 10*3/MM3 (ref 0–0.4)
EOSINOPHIL NFR BLD AUTO: 2.5 % (ref 0.3–6.2)
ERYTHROCYTE [DISTWIDTH] IN BLOOD BY AUTOMATED COUNT: 14.7 % (ref 12.3–15.4)
GLOBULIN UR ELPH-MCNC: 2.5 GM/DL
GLUCOSE SERPL-MCNC: 121 MG/DL (ref 65–99)
GLUCOSE UR STRIP-MCNC: NEGATIVE MG/DL
HCT VFR BLD AUTO: 45.5 % (ref 34–46.6)
HGB BLD-MCNC: 14.8 G/DL (ref 12–15.9)
HGB UR QL STRIP.AUTO: ABNORMAL
IMM GRANULOCYTES # BLD AUTO: 0.02 10*3/MM3 (ref 0–0.05)
IMM GRANULOCYTES NFR BLD AUTO: 0.3 % (ref 0–0.5)
KETONES UR QL STRIP: NEGATIVE
LEUKOCYTE ESTERASE UR QL STRIP.AUTO: NEGATIVE
LYMPHOCYTES # BLD AUTO: 1.13 10*3/MM3 (ref 0.7–3.1)
LYMPHOCYTES NFR BLD AUTO: 19.1 % (ref 19.6–45.3)
MCH RBC QN AUTO: 31.5 PG (ref 26.6–33)
MCHC RBC AUTO-ENTMCNC: 32.5 G/DL (ref 31.5–35.7)
MCV RBC AUTO: 96.8 FL (ref 79–97)
MONOCYTES # BLD AUTO: 0.51 10*3/MM3 (ref 0.1–0.9)
MONOCYTES NFR BLD AUTO: 8.6 % (ref 5–12)
NEUTROPHILS NFR BLD AUTO: 4.09 10*3/MM3 (ref 1.7–7)
NEUTROPHILS NFR BLD AUTO: 69.2 % (ref 42.7–76)
NITRITE UR QL STRIP: NEGATIVE
NRBC BLD AUTO-RTO: 0 /100 WBC (ref 0–0.2)
PH UR STRIP.AUTO: 5.5 [PH] (ref 4.5–8)
PLATELET # BLD AUTO: 176 10*3/MM3 (ref 140–450)
PMV BLD AUTO: 8.4 FL (ref 6–12)
POTASSIUM SERPL-SCNC: 4.4 MMOL/L (ref 3.5–5.2)
PROT SERPL-MCNC: 6.3 G/DL (ref 6–8.5)
PROT UR QL STRIP: NEGATIVE
RBC # BLD AUTO: 4.7 10*6/MM3 (ref 3.77–5.28)
SODIUM SERPL-SCNC: 140 MMOL/L (ref 136–145)
SP GR UR STRIP: >=1.03 (ref 1–1.03)
UROBILINOGEN UR QL STRIP: ABNORMAL
WBC NRBC COR # BLD: 5.92 10*3/MM3 (ref 3.4–10.8)

## 2022-04-26 PROCEDURE — 96413 CHEMO IV INFUSION 1 HR: CPT

## 2022-04-26 PROCEDURE — 81003 URINALYSIS AUTO W/O SCOPE: CPT

## 2022-04-26 PROCEDURE — 99214 OFFICE O/P EST MOD 30 MIN: CPT | Performed by: NURSE PRACTITIONER

## 2022-04-26 PROCEDURE — 25010000002 BEVACIZUMAB PER 10 MG: Performed by: NURSE PRACTITIONER

## 2022-04-26 PROCEDURE — 80053 COMPREHEN METABOLIC PANEL: CPT | Performed by: INTERNAL MEDICINE

## 2022-04-26 PROCEDURE — 25010000002 LEUCOVORIN 500 MG RECONSTITUTED SOLUTION 1 EACH VIAL: Performed by: NURSE PRACTITIONER

## 2022-04-26 PROCEDURE — 96416 CHEMO PROLONG INFUSE W/PUMP: CPT

## 2022-04-26 PROCEDURE — 25010000002 PALONOSETRON PER 25 MCG: Performed by: NURSE PRACTITIONER

## 2022-04-26 PROCEDURE — 25010000002 FLUOROURACIL PER 500 MG: Performed by: NURSE PRACTITIONER

## 2022-04-26 PROCEDURE — 85025 COMPLETE CBC W/AUTO DIFF WBC: CPT

## 2022-04-26 PROCEDURE — 25010000002 BEVACIZUMAB 100 MG/4ML SOLUTION 4 ML VIAL: Performed by: NURSE PRACTITIONER

## 2022-04-26 PROCEDURE — 25010000002 FOSAPREPITANT PER 1 MG: Performed by: NURSE PRACTITIONER

## 2022-04-26 PROCEDURE — 96367 TX/PROPH/DG ADDL SEQ IV INF: CPT

## 2022-04-26 PROCEDURE — 25010000002 DEXAMETHASONE SODIUM PHOSPHATE 100 MG/10ML SOLUTION: Performed by: NURSE PRACTITIONER

## 2022-04-26 PROCEDURE — 96375 TX/PRO/DX INJ NEW DRUG ADDON: CPT

## 2022-04-26 RX ORDER — PALONOSETRON 0.05 MG/ML
0.25 INJECTION, SOLUTION INTRAVENOUS ONCE
Status: CANCELLED | OUTPATIENT
Start: 2022-04-26

## 2022-04-26 RX ORDER — SODIUM CHLORIDE 9 MG/ML
250 INJECTION, SOLUTION INTRAVENOUS ONCE
Status: CANCELLED | OUTPATIENT
Start: 2022-04-26

## 2022-04-26 RX ORDER — SODIUM CHLORIDE 9 MG/ML
250 INJECTION, SOLUTION INTRAVENOUS ONCE
Status: COMPLETED | OUTPATIENT
Start: 2022-04-26 | End: 2022-04-26

## 2022-04-26 RX ORDER — PALONOSETRON 0.05 MG/ML
0.25 INJECTION, SOLUTION INTRAVENOUS ONCE
Status: COMPLETED | OUTPATIENT
Start: 2022-04-26 | End: 2022-04-26

## 2022-04-26 RX ADMIN — PALONOSETRON HYDROCHLORIDE 0.25 MG: 0.25 INJECTION INTRAVENOUS at 08:24

## 2022-04-26 RX ADMIN — FLUOROURACIL 4870 MG: 50 INJECTION, SOLUTION INTRAVENOUS at 10:24

## 2022-04-26 RX ADMIN — DEXAMETHASONE SODIUM PHOSPHATE 12 MG: 10 INJECTION, SOLUTION INTRAMUSCULAR; INTRAVENOUS at 08:59

## 2022-04-26 RX ADMIN — SODIUM CHLORIDE 150 ML: 900 INJECTION, SOLUTION INTRAVENOUS at 08:25

## 2022-04-26 RX ADMIN — LEUCOVORIN CALCIUM 810 MG: 500 INJECTION, POWDER, LYOPHILIZED, FOR SOLUTION INTRAMUSCULAR; INTRAVENOUS at 09:52

## 2022-04-26 RX ADMIN — SODIUM CHLORIDE 250 ML: 9 INJECTION, SOLUTION INTRAVENOUS at 08:24

## 2022-04-26 RX ADMIN — BEVACIZUMAB 900 MG: 400 INJECTION, SOLUTION INTRAVENOUS at 09:19

## 2022-04-28 ENCOUNTER — INFUSION (OUTPATIENT)
Dept: ONCOLOGY | Facility: HOSPITAL | Age: 42
End: 2022-04-28

## 2022-04-28 DIAGNOSIS — C20 ADENOCARCINOMA OF RECTUM: Primary | ICD-10-CM

## 2022-04-28 DIAGNOSIS — Z45.2 ENCOUNTER FOR FITTING AND ADJUSTMENT OF VASCULAR CATHETER: ICD-10-CM

## 2022-04-28 PROCEDURE — 25010000002 HEPARIN LOCK FLUSH PER 10 UNITS: Performed by: INTERNAL MEDICINE

## 2022-04-28 RX ORDER — SODIUM CHLORIDE 0.9 % (FLUSH) 0.9 %
10 SYRINGE (ML) INJECTION AS NEEDED
Status: DISCONTINUED | OUTPATIENT
Start: 2022-04-28 | End: 2022-04-28 | Stop reason: HOSPADM

## 2022-04-28 RX ORDER — HEPARIN SODIUM (PORCINE) LOCK FLUSH IV SOLN 100 UNIT/ML 100 UNIT/ML
500 SOLUTION INTRAVENOUS AS NEEDED
Status: CANCELLED | OUTPATIENT
Start: 2022-04-28

## 2022-04-28 RX ORDER — HEPARIN SODIUM (PORCINE) LOCK FLUSH IV SOLN 100 UNIT/ML 100 UNIT/ML
500 SOLUTION INTRAVENOUS AS NEEDED
Status: DISCONTINUED | OUTPATIENT
Start: 2022-04-28 | End: 2022-04-28 | Stop reason: HOSPADM

## 2022-04-28 RX ORDER — SODIUM CHLORIDE 0.9 % (FLUSH) 0.9 %
10 SYRINGE (ML) INJECTION AS NEEDED
Status: CANCELLED | OUTPATIENT
Start: 2022-04-28

## 2022-04-28 RX ADMIN — Medication 10 ML: at 08:03

## 2022-04-28 RX ADMIN — Medication 500 UNITS: at 08:03

## 2022-05-10 ENCOUNTER — INFUSION (OUTPATIENT)
Dept: ONCOLOGY | Facility: HOSPITAL | Age: 42
End: 2022-05-10

## 2022-05-10 ENCOUNTER — OFFICE VISIT (OUTPATIENT)
Dept: ONCOLOGY | Facility: CLINIC | Age: 42
End: 2022-05-10

## 2022-05-10 VITALS
DIASTOLIC BLOOD PRESSURE: 96 MMHG | OXYGEN SATURATION: 96 % | RESPIRATION RATE: 18 BRPM | WEIGHT: 215.2 LBS | SYSTOLIC BLOOD PRESSURE: 131 MMHG | TEMPERATURE: 96.9 F | HEART RATE: 99 BPM | HEIGHT: 65 IN | BODY MASS INDEX: 35.85 KG/M2

## 2022-05-10 VITALS — HEART RATE: 93 BPM | DIASTOLIC BLOOD PRESSURE: 82 MMHG | SYSTOLIC BLOOD PRESSURE: 121 MMHG

## 2022-05-10 DIAGNOSIS — Z79.01 ANTICOAGULATION ADEQUATE: ICD-10-CM

## 2022-05-10 DIAGNOSIS — R11.0 CHEMOTHERAPY-INDUCED NAUSEA: ICD-10-CM

## 2022-05-10 DIAGNOSIS — C78.01 MALIGNANT NEOPLASM METASTATIC TO BOTH LUNGS: ICD-10-CM

## 2022-05-10 DIAGNOSIS — C78.02 MALIGNANT NEOPLASM METASTATIC TO BOTH LUNGS: ICD-10-CM

## 2022-05-10 DIAGNOSIS — Z45.2 ENCOUNTER FOR FITTING AND ADJUSTMENT OF VASCULAR CATHETER: Primary | ICD-10-CM

## 2022-05-10 DIAGNOSIS — C20 ADENOCARCINOMA OF RECTUM: ICD-10-CM

## 2022-05-10 DIAGNOSIS — C20 ADENOCARCINOMA OF RECTUM: Primary | ICD-10-CM

## 2022-05-10 DIAGNOSIS — G62.0 DRUG-INDUCED PERIPHERAL NEUROPATHY: ICD-10-CM

## 2022-05-10 DIAGNOSIS — T45.1X5A CHEMOTHERAPY-INDUCED NAUSEA: ICD-10-CM

## 2022-05-10 LAB
ALBUMIN SERPL-MCNC: 3.7 G/DL (ref 3.5–5.2)
ALBUMIN/GLOB SERPL: 1.2 G/DL (ref 1.1–2.4)
ALP SERPL-CCNC: 89 U/L (ref 38–116)
ALT SERPL W P-5'-P-CCNC: 35 U/L (ref 0–33)
ANION GAP SERPL CALCULATED.3IONS-SCNC: 7 MMOL/L (ref 5–15)
AST SERPL-CCNC: 19 U/L (ref 0–32)
BASOPHILS # BLD AUTO: 0.03 10*3/MM3 (ref 0–0.2)
BASOPHILS NFR BLD AUTO: 0.5 % (ref 0–1.5)
BILIRUB SERPL-MCNC: <0.2 MG/DL (ref 0.2–1.2)
BILIRUB UR QL STRIP: NEGATIVE
BUN SERPL-MCNC: 14 MG/DL (ref 6–20)
BUN/CREAT SERPL: 18.2 (ref 7.3–30)
CALCIUM SPEC-SCNC: 9.4 MG/DL (ref 8.5–10.2)
CHLORIDE SERPL-SCNC: 107 MMOL/L (ref 98–107)
CLARITY UR: CLEAR
CO2 SERPL-SCNC: 25 MMOL/L (ref 22–29)
COLOR UR: YELLOW
CREAT SERPL-MCNC: 0.77 MG/DL (ref 0.6–1.1)
DEPRECATED RDW RBC AUTO: 54.8 FL (ref 37–54)
EGFRCR SERPLBLD CKD-EPI 2021: 98.9 ML/MIN/1.73
EOSINOPHIL # BLD AUTO: 0.3 10*3/MM3 (ref 0–0.4)
EOSINOPHIL NFR BLD AUTO: 4.7 % (ref 0.3–6.2)
ERYTHROCYTE [DISTWIDTH] IN BLOOD BY AUTOMATED COUNT: 15.6 % (ref 12.3–15.4)
GLOBULIN UR ELPH-MCNC: 3 GM/DL (ref 1.8–3.5)
GLUCOSE SERPL-MCNC: 97 MG/DL (ref 74–124)
GLUCOSE UR STRIP-MCNC: NEGATIVE MG/DL
HCT VFR BLD AUTO: 48 % (ref 34–46.6)
HGB BLD-MCNC: 15.5 G/DL (ref 12–15.9)
HGB UR QL STRIP.AUTO: ABNORMAL
IMM GRANULOCYTES # BLD AUTO: 0.01 10*3/MM3 (ref 0–0.05)
IMM GRANULOCYTES NFR BLD AUTO: 0.2 % (ref 0–0.5)
KETONES UR QL STRIP: NEGATIVE
LEUKOCYTE ESTERASE UR QL STRIP.AUTO: NEGATIVE
LYMPHOCYTES # BLD AUTO: 1.37 10*3/MM3 (ref 0.7–3.1)
LYMPHOCYTES NFR BLD AUTO: 21.6 % (ref 19.6–45.3)
MCH RBC QN AUTO: 31.7 PG (ref 26.6–33)
MCHC RBC AUTO-ENTMCNC: 32.3 G/DL (ref 31.5–35.7)
MCV RBC AUTO: 98.2 FL (ref 79–97)
MONOCYTES # BLD AUTO: 0.61 10*3/MM3 (ref 0.1–0.9)
MONOCYTES NFR BLD AUTO: 9.6 % (ref 5–12)
NEUTROPHILS NFR BLD AUTO: 4.01 10*3/MM3 (ref 1.7–7)
NEUTROPHILS NFR BLD AUTO: 63.4 % (ref 42.7–76)
NITRITE UR QL STRIP: NEGATIVE
NRBC BLD AUTO-RTO: 0 /100 WBC (ref 0–0.2)
PH UR STRIP.AUTO: 5.5 [PH] (ref 4.5–8)
PLATELET # BLD AUTO: 172 10*3/MM3 (ref 140–450)
PMV BLD AUTO: 8.5 FL (ref 6–12)
POTASSIUM SERPL-SCNC: 4.3 MMOL/L (ref 3.5–4.7)
PROT SERPL-MCNC: 6.7 G/DL (ref 6.3–8)
PROT UR QL STRIP: ABNORMAL
RBC # BLD AUTO: 4.89 10*6/MM3 (ref 3.77–5.28)
SODIUM SERPL-SCNC: 139 MMOL/L (ref 134–145)
SP GR UR STRIP: >=1.03 (ref 1–1.03)
UROBILINOGEN UR QL STRIP: ABNORMAL
WBC NRBC COR # BLD: 6.33 10*3/MM3 (ref 3.4–10.8)

## 2022-05-10 PROCEDURE — 25010000002 PALONOSETRON PER 25 MCG: Performed by: INTERNAL MEDICINE

## 2022-05-10 PROCEDURE — 96375 TX/PRO/DX INJ NEW DRUG ADDON: CPT

## 2022-05-10 PROCEDURE — 25010000002 FLUOROURACIL PER 500 MG: Performed by: INTERNAL MEDICINE

## 2022-05-10 PROCEDURE — 25010000002 FOSAPREPITANT PER 1 MG: Performed by: INTERNAL MEDICINE

## 2022-05-10 PROCEDURE — 25010000002 LEUCOVORIN 500 MG RECONSTITUTED SOLUTION 1 EACH VIAL: Performed by: INTERNAL MEDICINE

## 2022-05-10 PROCEDURE — 25010000002 BEVACIZUMAB PER 10 MG: Performed by: INTERNAL MEDICINE

## 2022-05-10 PROCEDURE — 25010000002 DEXAMETHASONE SODIUM PHOSPHATE 100 MG/10ML SOLUTION: Performed by: INTERNAL MEDICINE

## 2022-05-10 PROCEDURE — 36593 DECLOT VASCULAR DEVICE: CPT

## 2022-05-10 PROCEDURE — 85025 COMPLETE CBC W/AUTO DIFF WBC: CPT

## 2022-05-10 PROCEDURE — 99214 OFFICE O/P EST MOD 30 MIN: CPT | Performed by: INTERNAL MEDICINE

## 2022-05-10 PROCEDURE — 96416 CHEMO PROLONG INFUSE W/PUMP: CPT

## 2022-05-10 PROCEDURE — 36415 COLL VENOUS BLD VENIPUNCTURE: CPT

## 2022-05-10 PROCEDURE — 96367 TX/PROPH/DG ADDL SEQ IV INF: CPT

## 2022-05-10 PROCEDURE — 81003 URINALYSIS AUTO W/O SCOPE: CPT

## 2022-05-10 PROCEDURE — 96413 CHEMO IV INFUSION 1 HR: CPT

## 2022-05-10 PROCEDURE — 25010000002 BEVACIZUMAB 100 MG/4ML SOLUTION 4 ML VIAL: Performed by: INTERNAL MEDICINE

## 2022-05-10 PROCEDURE — 80053 COMPREHEN METABOLIC PANEL: CPT

## 2022-05-10 PROCEDURE — 25010000002 ALTEPLASE 2 MG RECONSTITUTED SOLUTION

## 2022-05-10 RX ORDER — HEPARIN SODIUM (PORCINE) LOCK FLUSH IV SOLN 100 UNIT/ML 100 UNIT/ML
500 SOLUTION INTRAVENOUS AS NEEDED
Status: CANCELLED | OUTPATIENT
Start: 2022-05-10

## 2022-05-10 RX ORDER — SODIUM CHLORIDE 9 MG/ML
250 INJECTION, SOLUTION INTRAVENOUS ONCE
Status: COMPLETED | OUTPATIENT
Start: 2022-05-10 | End: 2022-05-10

## 2022-05-10 RX ORDER — PALONOSETRON 0.05 MG/ML
0.25 INJECTION, SOLUTION INTRAVENOUS ONCE
Status: CANCELLED | OUTPATIENT
Start: 2022-05-10

## 2022-05-10 RX ORDER — PALONOSETRON 0.05 MG/ML
0.25 INJECTION, SOLUTION INTRAVENOUS ONCE
Status: COMPLETED | OUTPATIENT
Start: 2022-05-10 | End: 2022-05-10

## 2022-05-10 RX ORDER — SODIUM CHLORIDE 0.9 % (FLUSH) 0.9 %
10 SYRINGE (ML) INJECTION AS NEEDED
Status: CANCELLED | OUTPATIENT
Start: 2022-05-10

## 2022-05-10 RX ORDER — SODIUM CHLORIDE 0.9 % (FLUSH) 0.9 %
10 SYRINGE (ML) INJECTION AS NEEDED
Status: DISCONTINUED | OUTPATIENT
Start: 2022-05-10 | End: 2022-05-10 | Stop reason: HOSPADM

## 2022-05-10 RX ORDER — HEPARIN SODIUM (PORCINE) LOCK FLUSH IV SOLN 100 UNIT/ML 100 UNIT/ML
500 SOLUTION INTRAVENOUS AS NEEDED
Status: DISCONTINUED | OUTPATIENT
Start: 2022-05-10 | End: 2022-05-10 | Stop reason: HOSPADM

## 2022-05-10 RX ORDER — SODIUM CHLORIDE 9 MG/ML
250 INJECTION, SOLUTION INTRAVENOUS ONCE
Status: CANCELLED | OUTPATIENT
Start: 2022-05-10

## 2022-05-10 RX ADMIN — PALONOSETRON HYDROCHLORIDE 0.25 MG: 0.25 INJECTION, SOLUTION INTRAVENOUS at 08:54

## 2022-05-10 RX ADMIN — ALTEPLASE: 2.2 INJECTION, POWDER, LYOPHILIZED, FOR SOLUTION INTRAVENOUS at 07:46

## 2022-05-10 RX ADMIN — SODIUM CHLORIDE 250 ML: 9 INJECTION, SOLUTION INTRAVENOUS at 08:54

## 2022-05-10 RX ADMIN — LEUCOVORIN CALCIUM 810 MG: 500 INJECTION, POWDER, LYOPHILIZED, FOR SOLUTION INTRAMUSCULAR; INTRAVENOUS at 10:37

## 2022-05-10 RX ADMIN — DEXAMETHASONE SODIUM PHOSPHATE 12 MG: 10 INJECTION, SOLUTION INTRAMUSCULAR; INTRAVENOUS at 08:56

## 2022-05-10 RX ADMIN — SODIUM CHLORIDE 150 ML: 900 INJECTION, SOLUTION INTRAVENOUS at 09:14

## 2022-05-10 RX ADMIN — BEVACIZUMAB 900 MG: 400 INJECTION, SOLUTION INTRAVENOUS at 09:53

## 2022-05-10 RX ADMIN — FLUOROURACIL 4870 MG: 50 INJECTION, SOLUTION INTRAVENOUS at 11:25

## 2022-05-10 NOTE — PROGRESS NOTES
REASON FOR FOLLOW UP:     1. Rectal cancer, rectal ultrasound examination in July 2019 reported T3N0 disease without lymphadenopathy. She does have small pulmonary nodule 6-7 mm and 2 mm with indeterminate feature. There is no solid evidence of metastatic disease otherwise. Patient has stage IIA (T3N0M0) disease.  2. The patient is heterozygous factor V Leiden, was on prophylactic anticoagulation with Lovenox 40 mg daily given her increased risk of thrombosis with MediPort and GI malignancy.   3. PET scan on 8/8/2019 reported an 8 mm hypermetabolic right upper lobe pulmonary nodule, which is suspicious for metastatic as well.    4. Patient had a PowerPort placement on the left upper chest by Dr. Joseph on 8/9/2019.  5. Patient was started on concurrent infusional 5-FU chemoradiation therapy on 8/12/2019, with planned complete surgical resection and further adjuvant chemotherapy with intention to cure the disease.   6. Patient underwent surgical resection of the primary rectal cancer by Dr. Ye on 12/2/2019.  Pathology evaluation reported residual T3N1 disease stage IIIb.  7. Diarrhea related to therapy and radiation.   8. Patient was started cycle 1 day 1 of adjuvant FOLFOX 6 on 1/23/2020.  9. On 2/5/2020 FOLFOX 6 cycle 2 delayed secondary to neutropenia.  Patient was given 3 days of Granix injection.  After cycle #2 we planned 3 days of Granix with each cycle.   10. Patient also intolerant of oral iron.  Patient received 2 doses of IV injectafer on 02/05/2020 and 02/12/2020.   11. 02/12/2020 Proceed with cycle #2 of FOLFOX 6 with 3 days of Granix.  12. On 3/11/2020 cycle 4 postponed secondary to abdominal pain and occasional low-grade fevers.  CT scans ordered.  13. Cycle #4 resumed after CT scan revealed no evidence of disease.  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 Ye. patient has no fever.  Continue to observe.   14. Grade 3  hand-foot syndrome secondary to 5-FU after cycle #7 FOLFOX 6 chemotherapy, prompting ER visit.  Also has worsening peripheral neuropathy.   15. Cycle #8 FOLFOX 6 was given on 5/13/2020, with 50% dose reduction for both 5-FU and oxaliplatin, and also examination of bolus 5-FU.   16. PET scan examination on 5/21/2020 reported no evidence of metastatic disease in the chest abdomen pelvis.  Stable 6 mm RUL pulmonary nodule.  17. On 5/27/2020, I discussed with the patient that we can discontinue chemotherapy at this time.   18. Patient had a surgical procedure for low anterior colon resection, coloanal anastomosis on 8/3/2020.  19. CT scan for chest abdomen pelvis on 9/9/2020 reported a new 8 mm noncalcified pulmonary nodule in the left lower lobe of the lung.  Otherwise stable right upper lobe 6 mm pulmonary nodule, and no evidence of disease recurrence in the abdomen or pelvis.  20. PET scan examination on 9/18/2020 reported multiple hypermetabolic small pulmonary nodules/ new pulmonary nodules.   21. Patient was started on pelvic chemotherapy with FOLFIRI regimen on 10/13/2020.   22. 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 It reported tumor mutation burden 5 Muts/Mb, microsatellite stable, TP53 mutation R282W, and others.   23. Cycle #5 was without irinotecan, due to peripheral neuropathy.  24. 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  Patient was switched to weight-based Lovenox injection.  25. Cycle #6 5-FU and irinotecan was delayed by 2 weeks because of the above incident.  26. Patient had a telemedicine evaluation at that the [facility] [place] in February 2021.  They agreed with our treatment plan for palliative chemotherapy followed by maintenance chemotherapy.    27. PET scan examination on 4/6/2021 after cycle #12 palliative chemotherapy reported no  evidence of hypermetabolic metastatic lesion.   28. Patient was started on maintenance chemotherapy with 5-FU and Avastin on 4/13/2021. (Unable to tolerate Xeloda because of a significant hand-foot syndrome).   29. PET scan on 9/24/2021 obtained after cycle #12 maintenance chemotherapy with 5-FU, leucovorin, Avastin reported no evidence for active disease. We recommended 3 months chemotherapy holiday.  30. CT scan examination on 3/15/2022 reported slightly disease progression with increase in size of small pulmonary nodule.  We started patient back on maintenance chemotherapy on 3/29/2022 treatment with 5-FU plus leucovorin and Avastin, repeating every 2 weeks.      HISTORY OF PRESENT ILLNESS:  The patient is a 41 y.o. female the above-mentioned history who is here today for follow-up and evaluation prior to cycle #28 chemotherapy.      Patient reports good tolerance to chemotherapy.  She has no nausea no vomiting.  She continues to receive maintenance 5-FU, leucovorin, and Avastin every 2 weeks.  She continues on Zofran once daily the week following chemotherapy and has no issues with nausea or vomiting.  She is eating adequately.  Output from colostomy remains stable.  Peripheral neuropathy remains stable.  She denies fever or chills.  She denies new or worsening pain.  She denies new or worsening skin rash.  She has no new concerns today.    Patient also continues on Lovenox injection for anticoagulation.  He has mild bruising at the site of injection but otherwise no bleeding or bruising.    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    Laboratory studies today on 5/10/2022 reported normal CBC, and unremarkable CMP.      Past Medical History:   Diagnosis Date   • Abdominopelvic abscess (HCC) 08/12/2020    ADMITTED TO [facility]   • Abnormal Pap smear of cervix 02/02/1998    JULIUS I   • Anemia in pregnancy    • Anemia in pregnancy    • Anxiety    • Bilateral epiphora 04/2020   • Bilateral hand swelling  05/02/2020    SEEN AT [facility] ER   • Cervical lymphadenitis 06/06/2012    SEEN AT [facility] ER   • Chemotherapy induced diarrhea 01/2021   • Chemotherapy induced neutropenia (HCC) 04/2020   • Chemotherapy-induced nausea 02/2020   • Chemotherapy-induced thrombocytopenia 05/2020   • Chronic diarrhea    • Colon polyps     FOLLOWED BY DR. GERONIMO ESPARZA   • Cystitis 04/24/2020    WITH DEHYDRATION, ADMITTED TO [facility]   • Cystitis 10/27/2020   • Depression    • Drug-induced peripheral neuropathy (formerly [facility]) 05/2020   • Factor V Leiden mutation (formerly [facility])    • Fistula of intestine    • GERD (gastroesophageal reflux disease)    • Hand foot syndrome 05/2020   • Heart murmur     IN CHILDHOOD   • Hematochezia    • Hemorrhoids    • Heterozygous factor V Leiden mutation (formerly [facility])     DX 8-2-2019   • History of anemia    • History of chemotherapy 2019    FOLLOWED BY DR. ALEXANDRU HUNT   • History of gestational diabetes    • History of pre-eclampsia 1998   • History of radiation therapy 2019    FOLLOWED BY DR. JAVON LEWIS   • History of TB skin testing 01/17/2009    TB Skin Test   • HPV in female 1998   • Hypokalemia 09/2019   • Hypomagnesemia 09/2019   • Hyponatremia 06/2021   • IBS (irritable bowel syndrome)    • Ileostomy in place (formerly [facility])     FOLLOWED BY DR. 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   • Perirectal abscess 08/12/2020   • Pulmonary embolism without acute cor pulmonale (HCC) 09/20/2019    X 3; ADMITTED TO [facility]   • Pulmonary nodule, right 05/2020   • Rectal cancer (HCC) 07/08/2019    STAGE IIA, INVASIVE MODERATELY DIFFERENTIATED ADENOCARCINOMA, CHEMO AND XRT FINISHED 9/2019    • Right shoulder pain 2020    SEEN AT [facility] ER   • Right ureteral stone 10/01/2019    SEEN AT [facility] ER   • SAB (spontaneous ) 1996     A2-1 INDUCED   • Sciatica    • Sepsis due to cellulitis (HCC) 2002    LEFT GREAT TOE, ADMITTED TO [facility]   • Tachycardia 2020   • Thrombosis of superior vena cava (HCC) 2020    AND BRACHIOCEPHALIC VEIN, ADMITTED TO [facility]   • Urinary urgency 2020     Past Surgical History:   Procedure Laterality Date   • ABDOMINAL SURGERY     • CHOLECYSTECTOMY N/A 10/10/2003    LAPAROSCOPIC WITH CHOLANGIOGRAM, DR. JAMEY TALAVERA AT [facility]   • COLON RESECTION N/A 2019    Procedure: laparoscopic low anterior colon resection with mobilization of splenic flexure and diverting loop ileostomy: ERAS;  Surgeon: Padmaja Esparza MD;  Location: [facility];  Service: General   • COLON RESECTION N/A 8/3/2020    Procedure: LOW ANTERIOR COLON RESECTION, COLOANAL ANASTOMOSIS, MOBILIZATION SPLENIC FLEXURE;  Surgeon: Padmaja Esparza MD;  Location: [facility];  Service: General;  Laterality: N/A;   • COLONOSCOPY N/A 7/15/2020    PATENT ANASTAMOSIS IN MID RECTUM, RESCOPE IN 1 YR, DR. PADMAJA ESPARZA AT [facility]   • COLONOSCOPY W/ POLYPECTOMY N/A 2019    15 MM TUBULOVILLOUS ADENOMA POLYP IN HEPATIC FLEXURE, 20 MMTUBULOVILLOUS ADENOMA WITH HIGH GRADE DYSPLASIA IN RECTOSIGMOID, 4 CM MASS IN MID RECTUM, PATH: INVASIVE MODERATELY DIFFERENTIATED ADENOCARCINOMA, DR. JENNIFER LI AT [facility]   • DILATATION AND EVACUATION N/A    • ENDOSCOPY N/A 2019    LA GRADE A ESOPHAGITIS, GASTRITIS, ALL BIOPSIES BENIGN, DR. JENNIFER LI AT [facility]   • INCISION AND DRAINAGE PERIRECTAL ABSCESS N/A 2020    Procedure: INCISION AND DRAINAGE OF retrorectal dehiscence abcess with drain placement and irrigation;  Surgeon: Padmaja Esparza MD;  Location: [facility];  Service: General;  Laterality: N/A;   • PAP SMEAR N/A 2014   • SIGMOIDOSCOPY N/A  7/24/2019    INFILTRATIVE PARTIALLY OBSTRUCTING LARGE RECTAL CANCER, AREA TATOOED, DR. GERONIMO YE AT [facility]   • SIGMOIDOSCOPY N/A 11/23/2019    AN ULCERATED PARTIALLY OBSTRUCTING MASS IN MID RECTUM, AREA TATTOOED, DR. GERONIMO YE AT [facility]   • SIGMOIDOSCOPY N/A 7/22/2021    PATENT ANASTAMOSIS IN DISTAL RECTUM, RESCOPE IN 1 YR, DR. GERONIMO YE AT [facility]   • TRANSRECTAL ULTRASOUND N/A 7/24/2019    Procedure: ULTRASOUND TRANSRECTAL;  Surgeon: Geronimo Ye MD;  Location: [facility] ENDOSCOPY;  Service: General   • URETEROSCOPY LASER LITHOTRIPSY WITH STENT INSERTION Right 10/30/2019    DR. ESTUARDO BEASLEY AT [place]   • VAGINAL DELIVERY N/A 09/18/1998   • VENOUS ACCESS DEVICE (PORT) INSERTION Left 8/9/2019    Procedure: INSERTION VENOUS ACCESS DEVICE;  Surgeon: Sj Joseph MD;  Location: [facility];  Service: General   • VENOUS ACCESS DEVICE (PORT) INSERTION N/A 12/18/2020    Procedure: INSERTION VENOUS ACCESS DEVICE right side, removal venous access device left side;  Surgeon: Sj Joseph MD;  Location: [facility];  Service: General;  Laterality: N/A;   • WISDOM TOOTH EXTRACTION Bilateral 1993       HEMATOLOGIC/ONCOLOGIC HISTORY:      The patient reports she has intermittent rectal bleeding and urgency, mucous with her stool, starting sometime in 2016. At that time she was referred to GI service, and was diagnosed of irritable bowel syndrome. Nevertheless she had worsening urgency for bowel movement, and had a couple of incidents losing control of stool. She was recently seen by Roland Thorpe MD again and had colonoscopy and EGD exam on 07/08/2019. She was found having a circumferential rectal mass. According to the procedure note, the patient had a fungating circumferential bleeding 4 cm mass in the middle rectum, at distance between 13 cm and 17 cm from the anus. Mass was causing partial obstruction. There were also colon polyps found at the hepatic flexure and also at the rectosigmoid junction 23 cm from  the anus. Both were resected and retrieved. EGD examination reported grade A esophagitis at the GE junction and patchy discontinuous erythema and aggravation of the mucosa without bleeding in the stomach body. There is normal mucosa of the duodenum. Pathology evaluation from this procedure reported moderately differentiated adenocarcinoma involving the rectal mass. The rectal sigmoid junction polyp was tubular/tubulovillous adenoma with high grade dysplasia negative for invasive malignancy. The hepatic flexure polyp was also tubular/tubulovillous adenoma negative for high grade dysplasia or malignancy. The biopsy from the esophagus reports squamocolumnar mucosa with inflammatory changes suggestive of mild reflux esophagitis, negative for interstitial metaplasia. Gastric biopsy was benign and duodenal biopsy was also benign. There is no mention of H-pylori.     Patient was subsequently referred to colorectal surgeon Padmaja Ye MD for further evaluation. The patient had CT scan examination for chest, abdomen and pelvis requested by Dr. Ye and were done on 07/13/2019. The study reported no evidence of lymphadenopathy in the abdomen and pelvis. There was wall thickening of the rectosigmoid junction. Normal spleen, pancreas, adrenal glands and kidneys. There was fatty liver infiltration but no focal lymphatic lesions. There was a small 6-7 mm noncalcified nodule in the right upper lobe and a tiny 3 mm subpleural nodule in the right middle lobe. No mediastinal or hilar lymphadenopathy.     Dr. Ye performed staging rectal ultrasound examination. This study reported tumor penetrating through the muscularis propria as T3 disease; there were no lymph nodes identified.    She had a hospitalization in late September 2019 because of newly discovered pulmonary emboli.  She was on prophylactic Lovenox prior to the incident of PE.  Now she is on full dose Xarelto anticoagulation.  Patient reports no further chest pain  dyspnea.  She denies bleeding or bruising.  During her hospitalization, she was seen by Dr. Ye, who plans to have surgery 8 to 12 weeks after finishing radiation therapy.  She finished her radiation on 9/19/2019.     I noticed patient also presented to the emergency room on 10/1/2019 complaining of right flank area pain.  I reviewed the images studies and indeed she has a very small 1 to 2 mm obstructing kidney stone in the UV junction.  Patient is still symptomatic with some pains and dysuria.  She denies fever sweating or chills.    Laboratory study on 10/7/2019 reported normal CBC including hemoglobin 13.1, platelets 301,000, WBC 6170 and ANC 4900 lymphocytes 590 monocytes 480.      The nurse reported malfunction of port-a-catheter on 10/7/2019.  Port study on 10/8/2019 showed fibrin sheath around the distal aspect of the Mediport catheter in the SVC. This does not appear to hinder injection, but does prevent aspiration at this time.    Patient underwent surgical resection of the colon on 12/2/2019 with Dr. Ye.  Pathology evaluation reported residual T3 disease, also 1 out of 14 lymph nodes positive for malignancy.  So this patient in either had at least stage IIIb disease (T3 N1 M0?).      Adjuvant chemotherapy FOLFOX 6 will be started on 1/22/2020.  Laboratory study reported iron saturation 10%, free iron 31 TIBC 319 and ferritin 168.  Her hemoglobin was 11.8, WBC 5600, and platelets 347,000.    Patient was here on 02/12/2020 for cycle 2 of her FOLFOX.  This is delayed x1 week secondary to neutropenia.  The patient ultimately received 3 days worth of Neupogen with recovery of her blood counts.  Of note, the patient struggled with significant nausea without any episodes of vomiting following cycle #1 of chemotherapy resulting in significant weight loss.  She is up 12 pounds over the last week as her appetite has normalized.  We will add Emend to her care plan.    She is having several loose stools per her  colostomy and has been hesitant to take Imodium due to prior history of constipation.  I encouraged her to try this with a maximum of 8 tablets/day.  She denies any infectious symptoms including fevers or chills.  No excess bleeding or bruising noted.  She had the expected cold sensitivity related to the Oxaliplatin for about 3 days following treatment.    Labs from 02/12/2020 demonstrated total white blood cell count of 5.14, ANC of 3.06, hemoglobin of 11.2, platelets of 211,000.  She was found to be iron deficient last week and is intolerant to oral iron secondary to GI distress.  For this reason, she initiated IV iron therapy with Injectafer last week.  She had received her second dose 02/12/2020    Patient has normalized hemoglobin 12.2 on 2/26/2020.    She reported on 5/5/2020 she had a recent visit to the emergency department for what was suspected to be an allergic reaction.  She called our on-call service on Saturday with reports of hand and face swelling.  She was instructed to proceed to the emergency department at which time she was assessed and prescribed a Medrol Dosepak.  She had just completed her 5-FU and was unhooked on Friday, 5/3/2020.  Her symptoms have improved.  She does report persistent hyperpigmentation and mild swelling of the palms of the hands but this is much improved.  It was her right hand specifically that was swollen.  Her facial swelling has resolved.  She continues on Cefdinir nd since has 1 day remaining, she was prescribed cefdinir for a UTI requiring hospitalization at the end of April.  Reports no new symptoms.  Her labs are stable.  She is scheduled for treatment again.    Patient states at this time she is not tolerating her chemotherapy well.     Patient seen in the emergency department on 5/2/2020 for what was suspected to be an allergic reaction.  She called our on-call service on Saturday explaining that she was experiencing hand and face swelling.  She was instructed to  proceed to the emergency department at which time she was assessed and prescribed a Medrol Dosepak.  She had just completed her 5-FU and was unhooked on Friday, 5/1/2020.      She reports since her ED visit on 5/2/2020 her symptoms have not improved. Her hands and feet were swollen upon presenting to the ED and she could barely make a fist. She states that she feels so swollen she is not able to stand it. She states that her skin on the hands and feet are peeling extensively as well, besides changing color to more dark.     She also reports significant fatigue after her first week of the 5-FU treatment but she expected this side effect. She also notices significant increase in her neuropathy to the point that she is not able to even walk around in her home without her house slippers due to irritation from her rugs. She denies and associated nausea or vomiting at this time. She also has episodes of epistaxis every day after the chemotherapy cycle #7.  She does report working full-time during the week of chemotherapy.    Laboratory studies, 5/13/2020, show moderate thrombocytopenia platelets 123,000, low normal WBC 4140 including ANC 2720 and normal hemoglobin 13.4.  Because significant hand-foot syndrome, will decrease both 5-FU and oxaliplatin by 50%, and eliminate bolus dose of 5-FU.    On 5/21/2020 patient had a PET scan performed which showed no convincing evidence of residual disease in abdomen or pelvis, no metastatic disease within the chest or neck.     Cycle #8 FOLFOX 6 was given on 5/13/2020, with 50% dose reduction for both 5-FU and oxaliplatin, and also examination of bolus 5-FU.  She states on 5/27/2020 that with the recent reduction of the chemotherapy she feels significantly better. She has more energy and is able to do her daily routine.      PET scan examination on 5/21/2020 reported no evidence of metastatic disease in chest abdomen pelvis.  There was a stable 6 mm right upper lobe pulmonary  nodule.    Laboratory studies on 5/27/2020 showed mild leukocytopenia WBC 3720 but a normal ANC 2250 and lymphocytes 630.  Normal platelets 163,000 and hemoglobin 12.6.  Chemistry lab reported normal renal function, liver function panel and a electrolytes, elevated glucose 164.    Laboratory studies 6/24/2020, showed normal hemoglobin 13.4 but macrocytosis .9.  Normal platelets 210,000 and WBC 5870.  She had normal CMP.     Patient last time was here in late June 2020.  Since that time she had surgical procedure for low anterior colon resection, coloanal anastomosis on 8/3/2020.  She later developed a perirectal abscess, required surgical drainage on 8/14/2020.  She was discharged on 8/27/2020.    Patient reports to me she still has lower abdominal wall vacuum suction in place.  She denies fever sweating chills.  Performance status is ECOG 1.  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  She does have visiting nurse come to the home for wound care.    CT scan for chest abdomen pelvis on 9/9/2020 reported a new 8 mm noncalcified pulmonary nodule in the left lower lobe.  Otherwise stable right upper lobe 6 mm pulmonary nodule, and no evidence of disease recurrence in the abdomen or pelvis.     Laboratory study on 9/16/2020 reported elevated AST 55, ALT 95, alk phosphatase 143 but normal total bilirubin 0.3.  Chemistry lab otherwise unremarkable.  She has completed normal CBC.      Because of the abnormal CT scan examination for chest abdomen pelvis on 9/9/2020 reported a new 8 mm noncalcified pulmonary nodule in the left lower lobe, we obtained a PET scan examination for further evaluation, which was done on 9/18/2020.  This study reported several pulmonary nodules, some of them are hypermetabolic, newly developed compared to previous PET scan in May 2020.  Certainly does highly suspicious for metastatic disease.  There are also hypermetabolic activity in the abdomen and pelvic area  which is hard to differentiate from surgical scar versus metastatic malignancy.    Laboratory study on 10/13/2020 reported normal CBC with Hb 13.9, platelets 302,000 and WBC 5520 including ANC 3830.  Chemistry lab reported normalized AST 20, alk phosphatase 116 improved ALT 35, and maintains normal bilirubin, with normal electrolytes and liver function panel.     Patient was started on first cycle of palliative chemotherapy FOLFIRI on 10/13/2020.    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  Patient had a new port placed 12/18/2020, and had held her Xarelto for 2 days prior to surgery.  She presented to the ER on 12/21/20 with complaints of facial and arm swelling for 3 days.  She also noted increased shortness of breath.  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  Thrombus within the right internal jugular vein cannot be excluded.  Patient was started on IV heparin, and eventually transitioned to weight-based Lovenox, which she now continues.    PET scan examination on 9/24/2021 reported further shrinking of the tiny right upper lobe pulmonary nodule.  Otherwise no new lesions.  No evidence for metastatic disease in other areas.      Patient had CT scan for chest with IV contrast obtained on 12/14/2021 which reported small tiny stable pulmonary nodules. There is a 4 mm right upper lobe pulmonary nodule. There is also a stable 4 mm left lower lobe pulmonary nodule. There is also stable left upper lobe micronodule.     Laboratory studies today on 12/21/2021 reported normal hemoglobin 15.9 however .0, platelets 218,000 WBC 5360 including neutrophils 3730 lymphocytes 980. Chemistry lab reported normal renal function, electrolytes, glucose, and marginally elevated ALT 55, AST 34 but normal total bilirubin and alk  phosphatase.       MEDICATIONS    Current Outpatient Medications:   •  clonazePAM (KlonoPIN) 1 MG tablet, TAKE 1 TABLET BY MOUTH 3 TIMES A DAY AS NEEDED FOR ANXIETY OR SEIZURES, Disp: 90 tablet, Rfl: 1  •  Cyanocobalamin (VITAMIN B-12 PO), Take  by mouth., Disp: , Rfl:   •  dicyclomine (BENTYL) 20 MG tablet, TAKE 1 TABLET BY MOUTH EVERY 6 HOURS AS NEEDED, Disp: 360 tablet, Rfl: 0  •  diphenoxylate-atropine (LOMOTIL) 2.5-0.025 MG per tablet, Take 1 tablet by mouth 4 (Four) Times a Day As Needed for Diarrhea., Disp: 120 tablet, Rfl: 1  •  enoxaparin (LOVENOX) 100 MG/ML solution syringe, INJECT 0.9 ML UNDER THE SKIN INTO THE APPROPRIATE AREA AS DIRECTED EVERY 12 (TWELVE) HOURS., Disp: 60 each, Rfl: 2  •  escitalopram (LEXAPRO) 20 MG tablet, TAKE 1 TABLET BY MOUTH EVERY DAY, Disp: 90 tablet, Rfl: 3  •  famotidine (PEPCID) 20 MG tablet, TAKE 1 TABLET BY MOUTH TWICE A DAY, Disp: 180 tablet, Rfl: 1  •  fluorouracil in dextrose 5 % 250 mL infusion, Infuse  into a venous catheter 1 (One) Time. Takes twice a month., Disp: , Rfl:   •  Loratadine 10 MG capsule, Take 10 mg by mouth Every Evening., Disp: , Rfl:   •  metoprolol succinate XL (TOPROL-XL) 25 MG 24 hr tablet, Take 0.5 tablets by mouth Every Night., Disp: 45 tablet, Rfl: 3  •  mineral oil-hydrophilic petrolatum (AQUAPHOR) ointment, Apply 1 application topically to the appropriate area as directed As Needed for Dry Skin., Disp: , Rfl:   •  ondansetron (ZOFRAN) 8 MG tablet, TAKE 1 TABLET BY MOUTH 3 (THREE) TIMES A DAY AS NEEDED FOR NAUSEA OR VOMITING., Disp: 60 tablet, Rfl: 0  •  promethazine (PHENERGAN) 25 MG tablet, Take 1 tablet by mouth Every 6 (Six) Hours As Needed for Nausea or Vomiting., Disp: 60 tablet, Rfl: 2  No current facility-administered medications for this visit.    ALLERGIES:   No Known Allergies    SOCIAL HISTORY:       Social History     Tobacco Use   • Smoking status: Current Every Day Smoker     Packs/day: 1.00     Years: 24.00     Pack years: 24.00  "    Types: Electronic Cigarette, Cigarettes   • Smokeless tobacco: Never Used   • Tobacco comment: 1 PPD   Vaping Use   • Vaping Use: Some days   • Substances: Nicotine   • Devices: Disposable   Substance Use Topics   • Alcohol use: Not Currently     Comment: Caffeine use rare   • Drug use: No         FAMILY HISTORY:   Mother has positive factor V Leiden mutation, although she did not have thrombosis, mother also is coronary disease with stenting, she also is occasional bruising.  Maternal grandmother had DVT, she had multiple surgical procedures.  Patient mother's half-brother had metastatic colon cancer at diagnosis in his 50s.  Maternal great aunt has breast cancer.  Patient will follow his skin cancer in his 60s, exclusive.         Vitals:    05/10/22 0754   BP: 131/96   Pulse: 99   Resp: 18   Temp: 96.9 °F (36.1 °C)   TempSrc: Temporal   SpO2: 96%   Weight: 97.6 kg (215 lb 3.2 oz)   Height: 165.1 cm (65\")   PainSc: 0-No pain     Current Status 5/10/2022   ECOG score 0     Physical Exam  Vitals reviewed.   Constitutional:       General: She is not in acute distress.     Appearance: Normal appearance. She is well-developed.   HENT:      Head: Normocephalic and atraumatic.      Comments: Patient wears mask due to the pandemic coronavirus infection.     Eyes:      General: No scleral icterus.     Conjunctiva/sclera: Conjunctivae normal.   Cardiovascular:      Rate and Rhythm: Normal rate and regular rhythm.      Heart sounds: Normal heart sounds. No murmur heard.  Pulmonary:      Effort: Pulmonary effort is normal. No respiratory distress.      Breath sounds: Normal breath sounds.   Abdominal:      General: Bowel sounds are normal.      Palpations: Abdomen is soft.      Tenderness: There is no abdominal tenderness.      Comments: Ostomy in right abdomen. Scattered bruises on the abdomen bilaterally from Lovenox injections.   Musculoskeletal:         General: Swelling (Trace edema in both lower legs and ankles) " present.      Cervical back: Normal range of motion.   Skin:     General: Skin is warm and dry.      Findings: Bruising present.      Comments: Left lower abdomen wall abscess has resolved.  No signs of infection locally.   Neurological:      Mental Status: She is alert and oriented to person, place, and time. Mental status is at baseline.   Psychiatric:         Mood and Affect: Mood normal.         Thought Content: Thought content normal.             Trace edema on legs.       RECENT LABS:      Lab Results   Component Value Date    IRON 43 03/15/2022    TIBC 396 03/15/2022    FERRITIN 129.50 03/15/2022   Iron saturation 11%    Lab Results   Component Value Date    CEA 0.83 03/15/2022       Results from last 7 days   Lab Units 05/10/22  0743   WBC 10*3/mm3 6.33   NEUTROS ABS 10*3/mm3 4.01   HEMOGLOBIN g/dL 15.5   HEMATOCRIT % 48.0*   PLATELETS 10*3/mm3 172                Lab Results   Component Value Date    GLUCOSE 97 05/10/2022    BUN 14 05/10/2022    CREATININE 0.77 05/10/2022    EGFRIFNONA 79 12/21/2021    BCR 18.2 05/10/2022    K 4.3 05/10/2022    CO2 25.0 05/10/2022    CALCIUM 9.4 05/10/2022    ALBUMIN 3.70 05/10/2022    AST 19 05/10/2022    ALT 35 (H) 05/10/2022         IMAGING:      ASSESSMENT:   1.  Metastatic rectal cancer.   · Initial rectal ultrasound examination reported T3N0 disease without lymphadenopathy.   · CT scan of chest, abdomen and pelvis reported no lymphadenopathy in the abdomen, pelvis or chest. She does have small pulmonary nodule 6-7 mm and 2 mm with indeterminate feature. There is no solid evidence of metastatic disease otherwise.   · Based on the CT scan and rectal ultrasound imaging studies, this patient had stage IIA (T3N0M0) disease.   · She had PET scan examination on 8/8/2019 which reported a hypermetabolic right upper lobe pulmonary nodule 6 mm with SUV 5.6.  This is suspicious for metastatic disease however it is too small to be biopsied.  This patient may have stage IV disease.    · She initiated concurrent radiation with continuous 5-FU on 8/12/2019.  · Patient finished concurrent chemoradiation therapy.  · Patient underwent surgical resection of the rectal tumor and diverting loop ileostomy on 12/2/2019 with Dr. Ye.  Pathology evaluation reported residual T3 disease, with 1 out of 14 lymph nodes positive for malignancy.  Certainly this patient has at least stage IIIb rectal cancer (T3 N1 M0?)  · On 1/22/2020 adjuvant chemotherapy FOLFOX 6 regimen initiated.   · On 2/5/2022 cycle #2 was delayed secondary to neutropenia.  She was given 3 days of Granix.   · Emend added with cycle 3.  With improved nausea control  · Continuing home Granix x3 days following 5-FU disconnect  · 3/11/2020 due for cycle 4, however, she is experiencing progressive abdominal pain and occasional fevers.   · CT scan performed on 3/13/2020 reveals no evidence of progressive or recurrent disease.  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  · Patient hospitalized 4/24-4/26/20 after cycle 5 of chemotherapy with acute UTI.  CT abdomen/pelvis noting fluid collection in the presacral region having diminished in size compared to CT dated 3/13/2020.  Patient was evaluated by Dr. Ye while in the hospital with further plans to evaluate possible colovaginal fistula following completion of chemotherapy.  Patient did respond to IV antibiotics and discharged home on oral cefdinir.  · 4/29/2020 cycle 6 of FOLFOX.  Urinary symptoms have resolved   · Patient seen in the [facility], [place], operated by [facility] ED on 5/2/2020 for what was suspected to be an allergic reaction.  She called our service on Saturday explaining that she was experiencing hand and face swelling.  She was instructed to proceed to the emergency department at which time she was assessed and prescribed a Medrol Dosepak.  She had just completed her 5-FU and was unhooked on Friday, 5/1/2020.  Her symptoms have resolved in the office on assessment, 5/5/2020.  · The patient had grade 3  hand-foot syndrome based on symptomology.  Patient had cycle #8 of 5-FU on 5/13/2020. Due to her symptoms and poor tolerance to the 5-FU, her treatment dose will be reduced 50% for oxaliplatin and infusional 5-FU.  Bolus 5-FU will be discontinued..  · On 5/21/2020 patient had a PET scan and it showed no evidence of of metastatic disease in the neck, chest, abdomen or pelvis.  There was fluid accumulation/abscess.   · On 5/27/2020 discussed with the patient that we can discontinue chemotherapy at this time.  She will follow-up with Dr. Ye to discuss a possibility to reverse the ileostomy.  We likely will obtain anther PET scan in 3 to 4 months for reassessment.    · Patient was seen by Dr. Ye on 6/19/2019 for evaluation and to discuss possible take down of her ileostomy.  She is scheduled to have a gastrografin enema on 7/2/2020 to evaluate for a fistula, and then a colonoscopy on 7/15/2020, both done by Dr. Ye.  She states that based on the above imaging and procedure findings, she may have a more extensive surgery done or just have her ileostomy reversed, which would likely be done in August 2020.  This will all be coordinated under the care of Dr. Ye.     · CT scan from 09/09/2020 and also compared to her previous PET scan examination from 05/21/2020 and also the original PET scan from 08/09/2019.  The original PET scan there is a small right upper lobe pulmonary nodule 6 mm with SUV 5.6. So in the 05/2020 PET scan the nodule is still there but activity seems much less and this most recent CT scan examination also documented the preexisting small pulmonary nodule however there is a new left lower lobe pulmonary nodule 8 mm in size and I shared with the patient today this nodule is suspicious for metastatic disease. Laboratory studies reported minimal CEA level 1.32 on 09/09/2020. Liver function panel was only marginally abnormal with the ALT 45, the remaining is normal. However laboratory study today  showed worsening AST 55, ALT 95 and alkaline phosphatase 143 but is still normal, total bilirubin 0.3.   · Recommended to have repeat PET scan examination for assessment because the left lower lobe pulmonary nodule is too small to be biopsied, and if the PET scan reports hypermetabolic lesion, I recommended to have stereotactic radiation therapy. Explained to patient that she is not a really good candidate to have thoracotomy for resection because she still has unhealed wound in the abdomen. Stereotactic radiation therapy will likely be a more feasible choice.   · PET scan examination obtained on 9/18/2020 showed metastatic disease.  It reported a few hypermetabolic pulmonary nodules, and some new small pulmonary nodules, all of them highly suspicious for metastatic disease.  She also has hypermetabolic activity in the abdomen and pelvic area which could be related to scar tissue from her recent surgery versus metastatic disease.  Nevertheless overall picture fits with metastatic cancer.  · Discussed with the patient on 9/20/2020, we reviewed the PET scan images together, and I recommended to have systematic chemotherapy, I do not think stereotactic reading therapy to the hypermetabolic lesions in the lungs warranted at this time because even those will be treated with reading therapy, she will still need systematic chemotherapy.  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  I did discuss with the patient using anti-EGFR monoclonal antibody versus anti-VEGF monoclonal antibody.     · Palliative chemotherapy cycle#1 FOLFIRI was started on 10/13/2020.  No bolus 5-FU given considering previous poor tolerance.    · NGS study from [facility] reported positive for [place] mutation.  Microsatellite stable, mutation burden 5/Mb.   · Discussed with the patient 10/27/2020, because of the K-[place] mutation, she is not a candidate for anti-EGFR monoclonal antibody such as Erbitux or  vectibix.  She could be a candidate for anti-VEGF monoclonal antibody, however because of active wound, she is not a candidate right now at this moment.  · Patient seen in the ED for acute right neck pain on 11/16/2020.  CT angiogram as well as CT of the cervical spine performed with no acute findings but notably one of her pulmonary nodules, left upper lobe, somewhat decreased in size.  Patient given prednisone pack.  Further details below.  · Cycle #6 chemotherapy will be resumed on 1/5/2021.  Started back on original irinotecan.  · PET scan examination obtained on 1/12/2021 after cycle #6 chemotherapy reported improved pulmonary nodule hypermetabolic activity.  Confirmed these are metastatic lesions.  · Patient is evaluated 1/19/2020, will continue cycle #7 FOLFIRI chemotherapy.  However Avastin will be on hold see next section.   · Patient was reevaluated on 2/2/2021, will continue chemotherapy cycle #8 FOLFIRI.  Avastin / biosimilar will be added.    · She talked to [facility] cancer [facility] specialist in mid February 2021, and had recommended palliative chemotherapy without surgical intervention of metastatic lung lesions.   · On 3/2/2021, patient will proceed with cycle #10 FOLFIRI plus bevacizumab.  After 12 cycles, plan to start maintenance treatment.  · 3/16/2021 cycle 11.  Plus bevacizumab.  · 3/30/2021 cycle 12 FOLFIRI plus bevacizumab.  Having issues with worsening nausea despite premeds with dexamethasone, Aloxi, Emend, and Zofran at home.  Patient is requesting a dose of Phenergan, which is helped her in the past.  We will give this to her with treatment today.  · PET scan examination on 4/6/2021 reported no evidence of hypermetabolic metastatic lesion.  · Discussed with the patient on 4/13/2021, we reviewed the PET scan results.  We recommended to switch to maintenance chemotherapy without irinotecan.  We will continue 5-FU and Avastin every 2 weeks.  We discussed possibility of  switching to oral Xeloda treatment.  Discussed with the patient for side effects more so with hand-foot syndrome.  Patient is agreeable.   · Xeloda 2000 mg twice daily 7 days on, 7 days off along with Avastin initiated 4/27/2021.  · After only 5 doses of Xeloda significant hand-foot syndrome, Xeloda held. Patient requesting to be transitioned back to infusional 5-FU, as she felt that she tolerated infusional 5-FU without any problem.  The patient will receive 5-FU leucovorin and Avastin every 2 weeks.  Xeloda discontinued.  · 5/25/2021 continued cycle #4 maintenance 5-FU, leucovorin, Avastin tolerating this much better so far, we will continue this. Recommended to have 12 cycles of maintenance chemotherapy, then obtain PET scan for reevaluation.  We could discuss further treatment plan at that time.  · 9/14/2021 patient due for cycle 12 of additional maintenance 5-FU/leucovorin plus Avastin.  She continues to tolerate treatment well overall.  She is anxious in the office today with her Mediport not getting blood at first and also with upcoming scans being heavy on her mind.  She was instructed to take one of her Klonopin pills while here to help calm her mind.  Heart rate did improve from 140s down to 112.  Proceed with treatment today as scheduled.  · PET scan examination on 9/24/2021 reported further shrinking of the right upper lobe tiny pulmonary nodule.  No other new lesions.  · Discussed with patient on 9/24/2021 about further shrinking of the right upper lobe pulmonary nodule and no evidence for other new lesions. We recommended to have chemo break for 3 months with repeated CT scan for reevaluation.  · Recent CT scan for chest 12/14/2021 and abdomen CT from 11/29/2021 reported no evidence for active disease. The right upper lobe pulmonary nodule, left lower lobe pulmonary nodule minimal about 4 mm and stable. I discussed with patient today on 12/21/2021, recommended to give her another 3 months chemotherapy  holiday and obtain CT scan afterwards for reevaluation. After discussion, patient is agreeable.   · CT scan examination for chest abdomen and pelvis obtained on 3/15/2022 reported a slight increase of the left upper lobe pulmonary nodule 5 mm, from previous 3 mm.  The other pulmonary nodules were stable in size.  There is also small left upper lobe groundglass changes.   · I reviewed images studies with the patient today on 3/23/2022, compared to multiple previous images including CT chest CT and PET scan, suspected disease progression.  Discussed with the patient, and I recommended to resume maintenance chemotherapy with 5-FU plus leucovorin plus Avastin repeating every 2 weeks, and obtaining CT scan for reassessment in 3 months.  Patient is agreeable.  · Patient resumed maintenance chemotherapy on 3/29/2022 with 5-FU leucovorin and Avastin.   · 4/26/2022, proceed with cycle #27 maintenance 5-FU, leucovorin, and Avastin.  Patient continues to tolerate treatment well.    · On 5/10/2022, we will proceed ahead with cycle #28 maintenance chemotherapy.  Plan to have CT scan after cycle #30.      2.   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  Low-dose Lovenox discontinued and initiated Xarelto 20 mg daily.  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  · Patient held Xarelto for 2 days prior to MediPort removal from the left chest wall and placement of new port in the right chest wall on 12/18/2020.  She resumed Xarelto  that evening.  · Progressive swelling in the neck, face, upper extremities prompting hospitalization with CT scan showing thrombosis in the right subclavian vein and SVC.  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  Although she had been off of Xarelto for a few days, clearly Xarelto was not prohibiting progression of her thrombosis after she resumed treatment and there was some evidence to suggest thrombosis had been present at least in the innominate vein on prior scan in September but now appears chronic.  Current acute thrombosis involving SVC and right subclavian.  · Patient was admitted and placed on heparin drip  · Patient was evaluated by vascular surgery and intervention was not recommended for thrombolysis/thrombectomy.  · On 12/22/2020, the patient developed worsening shortness of breath, increasing upper extremity edema consistent with worsening SVC syndrome.  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  · Symptoms improved and patient was discharged 12/24/2020 on Lovenox 100 mg every 12 hours.    · Returns 12/29/2020 for evaluation continuing Lovenox, overall tolerating it well.  No bleeding issues.  · Improved edema 1/5/2021.  Will continue Lovenox weight-based every 12 hours.  · On 1/19/2021 and 2/2/2021, patient reports improved facial swelling.  She however does have being bruise on her abdomen wall where she does Lovenox injection.  However she does not have a spontaneous epistaxis, gum bleeding or other bleeding.   · On 2/2/2021, we discussed that side effects of Avastin/biosimilar related to thrombosis.  Since this patient already has been on Lovenox, I think the benefits from treatment for her cancer will outweigh that risk of thrombosis, will proceed ahead with Avastin.  I cautioned patient to watch  out for signs of worsening thrombosis patient voiced understanding.   · On 3/16/2021, no evidence of worsening DVT, continue Lovenox.  · 5/11/2021 Lovenox dosing will be adjusted due to patient's weight loss.  We discussed she needs 1 mg/kg.  Her syringes are 100 mg syringes she will do 0.9 mL subcu every 12 hours.  · 8/3/2021 Patient continues to have bruising on abdomen from lovenox injections.  Will need to monitor weight and adjust dosing in future if further weight loss.   · Stable condition on 9/28/2021.  Continue Lovenox anticoagulation.    · Patient reports no chest pain no dyspnea no leg edema. However she had bruising and also most recently abnormal small abscess for which she actually went to ER on 11/29/2021, had I&D. The wound is healing. No further drainage. Denies fever sweating or chills. After discussion, she will continue Lovenox injection for now.   · On 3/23/2022, patient reports good tolerance of Lovenox.  Nevertheless she still has small quantity of pus in the left lower abdomen abscess, she squeezes it every day to prevent it became worse.  She reports no fever sweating chills.  Recommended to start Augmentin 875 mg twice a day for 7 days.  She will continue Lovenox indefinitely.  · On 4/12/2022, patient reports resolution of the small abscess at left lower abdominal wall.   · On 5/10/2022, patient continues on Lovenox indefinitely.  No easy bleeding or bruising.    3.  This patient also has strong family history for malignancy the patient had appointment with genetic counseling on September 3, 2019.  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    4.  Mild anemia, improved since her surgery.    · She also has a history of iron deficiency.  Iron studies reveal iron saturation of 10%.  She was started on oral iron but was unable to tolerate due to GI side effects.   · Status post Injectafer 2/5/2020 and 2/12/2020   · Improved hemoglobin 13.5 on 1/5/2021.  · 3/16/2020 when hemoglobin now up to  16.2, hematocrit 47.6.  Patient admits that she has started smoking again, and I have encouraged her to quit.  She denies any snoring or sleep apnea diagnosis.  Patient is not taking oral iron.  We will closely monitor.  · 3/30/2020 hemoglobin 15.7, HCT 47.5.  Patient states that she has cut back significantly on her smoking, although not quit yet.  I have encouraged her to continue working on this.  We will continue to closely monitor.  · Hemoglobin 14.6 on 6/8/2021 at the meantime he has worsening macrocytosis .6.    · Laboratory studies 6/22/2021 reported excellent folate > 20 ng/mL, however low normal B12 level 357 pg/mL.    · On 7/6/2021, normal hemoglobin 15.8, .9 and HCT 47.2%.  Patient was asked to start oral vitamin B12 at 1000 mcg daily.   · 9/14/2021 hemoglobin 17.0, hematocrit 52.1.  Monitor.  · On 9/28/2021 hemoglobin 16.1 hematocrit 48.5%, continue to monitor.   · Lab study on 12/14/2021 reported excellent ferritin 296 and iron saturation 25% with free iron 104 TIBC 424. Hemoglobin was 15.2 with .2.   · Normal hemoglobin 14.6 on 3/15/2022.  Iron study reported normal ferritin 129.5 ng/mL however slightly low iron saturation 11%.  Continue to monitor for now.  · 4/26/2022, hemoglobin today 14.8.    5.  Peripheral neuropathy secondary to chemotherapy.   · This is mild involving bilateral hands and feet as reported on 9/16/2020.   · Will avoid chemotherapy with oxaliplatin.  · Stable mild neuropathy as of 11/10/2020  · Patient reports worsening peripheral neuropathy and cycle #5 chemotherapy on 12/8/2020 with and without irinotecan.   · On 1/5/2021, patient reports improvement of peripheral neuropathy, will add irinotecan back to chemotherapy.  Continue to monitor and adjust medication.  · On 3/2/2021, patient reports no worsening of peripheral neuropathy after restarting irinotecan.   · This remains stable, may be slightly better as reported on 3/23/2022..   · No worsening since  resuming chemotherapy on 3/29/2022.  · On 5/10/2022 peripheral neuropathy remains stable today.    6.  History of hand-foot syndrome grade 3.    · It become so significant after cycle #7 FOLFOX treatment.  Discussed with patient on 5/13/2020, will discontinue the bolus 5-FU dose, and also decrease 50% of the infusion dose through the pump.   · We also use Medrol Dosepak for possible recurrent symptomology.  She responded this well.   · Her hand-foot syndrome improved.  · Under current treatment with FOLFIRI, patient not receiving 5-FU bolus.  No recurrent issues.   · Patient will be switched to maintenance chemotherapy using Xeloda in late April 2021.  · Following 5 doses of Xeloda, significant hand-foot syndrome with pain in the feet, significant erythema.  Xeloda held.  Will be further discussed with Dr. Nguyen to determine if the patient will resume 5-FU versus a dose reduction to Xeloda.  · Aggressive emollient moisturizer use twice daily for the past week and patient reports improvement in the dryness and erythema.  Xeloda will now be discontinued and patient will switch back to 5-FU.  · As of 5/25/2021 dryness and erythema/hyperpigmentation continues to improve.  Xeloda has been discontinued.  · 6/8/2021, patient reports much improved hand-foot syndrome, with remnant pigmentation on palms.  · On 7/6/2021, patient reports and had a some flareup of hand-foot syndrome, however improved after aggressive moisturizing cream and the urea cream.  Overall much tolerated infusional 5-FU compared to Xeloda.    · 7/20/2021 continues to have mild hyperpigmentation of her hands and feet bilaterally, she continues aggressive moisturization with utter balm with urea.  She also reports some soreness of her tailbone, and we discussed that this is likely due to loss of weight and muscle mass.  I advised her to go ahead and apply moisturizer to this area as there is one tiny fissure.  Also recommended using a waffle pad or  doughnut to sit on.  · 8/3/2021 patient reports her hand foot symptoms are stable and without worsening.  Continue to monitor.  · Symptoms stable, typically flaring about 24 hours after she is unhooked from her 5-FU infusional ball and then settling down with some mild peeling.   · Since off chemotherapy no specific complaints.   · No recurrent symptoms after resuming chemotherapy on 3/29/2022.    7.  Hyperlacrimation and mild scleral irritation related to 5-FU.  She has been taking steroid eyedrops.  This has improved her hyperlacrimation.  · Not currently an issue.     8.  Abnormal liver function panel.  · Overall LFTs have improved which is mild ongoing elevation of AST and ALT.  Continue to monitor.    · Slightly worse AST 43 ALT 84 on 9/28/2021.  Continue to monitor.  · Slightly elevated AST 34 ALT 55 on 12/21/2021. Continue to monitor.    · Normal liver function panel on 3/15/2022 and on 4/12/2022. 0.70.   · Normal liver function panel today on 5/10/2022 except ALT 35.    9.  Depression.  Patient seen by JULIET Davenport on 11/9/2020.  She was started on mirtazapine.  Lexapro was discontinued.  This has definitely improved her mood with the patient feeling overall much better.  She is sleeping better.  Appetite is improved with her actually eating more than she wants to.    · Condition is stable.  She plans to continue follow-up with supportive oncology.      10.  Proteinuria: 3/30/2020: Patient is 2+ protein in her urine.  We will continue with Avastin and closely monitor.  No need for 24-hour urine at this time.  · Persistent 2+ proteinuria on 4/13/2021.  continue to monitor.  · 5/25/2021 protein urine only 1+.    · 6/22/2021 persistent 1+ proteinuria.  Continue to monitor.  · 7/6/2021, trace protein.  Continue Avastin treatment and monitoring.  · 8/3/2021 1+ protein, stable from last cycle.   · On 8/17/2021, urine protein 2+.  She also had a 1+ leukocytes.  Patient is not symptomatic such as fever,  dysuria or gross hematuria, however urine culture obtained, with >100,000 E. Faecalis. Patient treated with Levaquin.   · No active problem currently.  Trace amount urine protein today.    11.  Tachycardia:   · Patient was seen by Dr. Bustos cardiologist on 4/6/2021.   · On 9/14/2021 patient more tachycardic in the setting of anxiety.  Improved with Klonopin.  · 4/26/2022 heart rate 88.  · On 5/10/2022 heart rate 99.    12.  Hypertension.  · /91 at visit 8/17/2021.  She was given clonidine for treatment that day and also prescribed lisinopril 10 mg daily.    · Patient unable to tolerate lisinopril, noting that her blood pressure bottomed out with systolic of barely 80 and feeling very fatigued and wiped out.  She has been checking her blood pressure off the lisinopril with systolics in the 120s to 130s at home and diastolic in the 80s to 90s.   · She remains off antihypertensives.  Blood pressure today 121/82.    PLAN:   1. Proceed today with cycle #28 maintenance 5-FU, leucovorin, and Avastin.  This will be continued every 2 weeks.  2. Continue Lovenox 1 mg/kg twice daily.  3. Continue oral B12 at 1000 mcg daily.  4. Return in 2 weeks for APRN follow-up prior to cycle #29 maintenance chemotherapy.  Routine labs CBC and CMP.  5. Patient will come back to see me in 4 weeks, with laboratory studies prior to cycle #30 maintenance chemotherapy.  6. Arrange patient to have CT scan for chest abdomen pelvis with IV contrast in 5 weeks for assessment of her response.  7. Patient will see me again in 5-6 weeks for reevaluation, and to discuss CT scan results and further management plan.    Discussed with the patient by laboratory results and the ongoing treatment, as well as reevaluation of her disease status.  Patient is agreeable with the above recommendations.    The patient is on high risk medication that requires close monitoring for toxicity.    Dong Nguyen MD PhD  05/10/22      CC:  Deepika  Mirian, III NP-C   MD Perla Bowers MD

## 2022-05-11 ENCOUNTER — OFFICE VISIT (OUTPATIENT)
Dept: CARDIOLOGY | Facility: CLINIC | Age: 42
End: 2022-05-11

## 2022-05-11 ENCOUNTER — HOSPITAL ENCOUNTER (OUTPATIENT)
Dept: CARDIOLOGY | Facility: HOSPITAL | Age: 42
Discharge: HOME | End: 2022-05-11
Admitting: INTERNAL MEDICINE

## 2022-05-11 VITALS
HEART RATE: 74 BPM | BODY MASS INDEX: 36.88 KG/M2 | SYSTOLIC BLOOD PRESSURE: 118 MMHG | HEIGHT: 64 IN | WEIGHT: 216 LBS | DIASTOLIC BLOOD PRESSURE: 70 MMHG

## 2022-05-11 VITALS
WEIGHT: 215 LBS | OXYGEN SATURATION: 96 % | HEIGHT: 64 IN | SYSTOLIC BLOOD PRESSURE: 120 MMHG | BODY MASS INDEX: 36.7 KG/M2 | DIASTOLIC BLOOD PRESSURE: 80 MMHG | HEART RATE: 82 BPM

## 2022-05-11 DIAGNOSIS — C20 ADENOCARCINOMA OF RECTUM: ICD-10-CM

## 2022-05-11 DIAGNOSIS — C20 ADENOCARCINOMA OF RECTUM: Chronic | ICD-10-CM

## 2022-05-11 DIAGNOSIS — I10 PRIMARY HYPERTENSION: Primary | Chronic | ICD-10-CM

## 2022-05-11 LAB
AORTIC ARCH: 2.4 CM
ASCENDING AORTA: 3.4 CM
BH CV ECHO LEFT VENTRICLE GLOBAL LONGITUDINAL STRAIN: -21.3 %
BH CV ECHO MEAS - ACS: 1.64 CM
BH CV ECHO MEAS - AO MAX PG: 11.7 MMHG
BH CV ECHO MEAS - AO MEAN PG: 6.7 MMHG
BH CV ECHO MEAS - AO ROOT DIAM: 2.48 CM
BH CV ECHO MEAS - AO V2 MAX: 171 CM/SEC
BH CV ECHO MEAS - AO V2 VTI: 35.3 CM
BH CV ECHO MEAS - AVA(I,D): 1.4 CM2
BH CV ECHO MEAS - EDV(CUBED): 78.4 ML
BH CV ECHO MEAS - EDV(MOD-SP2): 86 ML
BH CV ECHO MEAS - EDV(MOD-SP4): 87 ML
BH CV ECHO MEAS - EF(MOD-BP): 70.7 %
BH CV ECHO MEAS - EF(MOD-SP2): 69.8 %
BH CV ECHO MEAS - EF(MOD-SP4): 70.1 %
BH CV ECHO MEAS - ESV(CUBED): 15.1 ML
BH CV ECHO MEAS - ESV(MOD-SP2): 26 ML
BH CV ECHO MEAS - ESV(MOD-SP4): 26 ML
BH CV ECHO MEAS - FS: 42.2 %
BH CV ECHO MEAS - IVS/LVPW: 1.04 CM
BH CV ECHO MEAS - IVSD: 0.98 CM
BH CV ECHO MEAS - LAT PEAK E' VEL: 12.1 CM/SEC
BH CV ECHO MEAS - LV DIASTOLIC VOL/BSA (35-75): 42.6 CM2
BH CV ECHO MEAS - LV MASS(C)D: 132.9 GRAMS
BH CV ECHO MEAS - LV MAX PG: 4.1 MMHG
BH CV ECHO MEAS - LV MEAN PG: 2.5 MMHG
BH CV ECHO MEAS - LV SYSTOLIC VOL/BSA (12-30): 12.7 CM2
BH CV ECHO MEAS - LV V1 MAX: 101.5 CM/SEC
BH CV ECHO MEAS - LV V1 VTI: 21.6 CM
BH CV ECHO MEAS - LVIDD: 4.3 CM
BH CV ECHO MEAS - LVIDS: 2.47 CM
BH CV ECHO MEAS - LVOT AREA: 2.3 CM2
BH CV ECHO MEAS - LVOT DIAM: 1.71 CM
BH CV ECHO MEAS - LVPWD: 0.94 CM
BH CV ECHO MEAS - MED PEAK E' VEL: 9.1 CM/SEC
BH CV ECHO MEAS - MR MAX PG: 29.2 MMHG
BH CV ECHO MEAS - MR MAX VEL: 270.1 CM/SEC
BH CV ECHO MEAS - MV A DUR: 0.09 SEC
BH CV ECHO MEAS - MV A MAX VEL: 90.8 CM/SEC
BH CV ECHO MEAS - MV DEC SLOPE: 497.6 CM/SEC2
BH CV ECHO MEAS - MV DEC TIME: 0.23 MSEC
BH CV ECHO MEAS - MV E MAX VEL: 96.8 CM/SEC
BH CV ECHO MEAS - MV E/A: 1.07
BH CV ECHO MEAS - MV MAX PG: 5.8 MMHG
BH CV ECHO MEAS - MV MEAN PG: 2.6 MMHG
BH CV ECHO MEAS - MV P1/2T: 68.1 MSEC
BH CV ECHO MEAS - MV V2 VTI: 27 CM
BH CV ECHO MEAS - MVA(P1/2T): 3.2 CM2
BH CV ECHO MEAS - MVA(VTI): 1.84 CM2
BH CV ECHO MEAS - PA ACC TIME: 0.14 SEC
BH CV ECHO MEAS - PA PR(ACCEL): 16 MMHG
BH CV ECHO MEAS - PA V2 MAX: 107.2 CM/SEC
BH CV ECHO MEAS - PULM A REVS DUR: 0.11 SEC
BH CV ECHO MEAS - PULM A REVS VEL: 32.6 CM/SEC
BH CV ECHO MEAS - PULM DIAS VEL: 34.3 CM/SEC
BH CV ECHO MEAS - PULM S/D: 1.66
BH CV ECHO MEAS - PULM SYS VEL: 57 CM/SEC
BH CV ECHO MEAS - QP/QS: 0.78
BH CV ECHO MEAS - RAP SYSTOLE: 3 MMHG
BH CV ECHO MEAS - RV MAX PG: 2.38 MMHG
BH CV ECHO MEAS - RV V1 MAX: 77.1 CM/SEC
BH CV ECHO MEAS - RV V1 VTI: 17.5 CM
BH CV ECHO MEAS - RVOT DIAM: 1.68 CM
BH CV ECHO MEAS - RVSP: 11 MMHG
BH CV ECHO MEAS - SI(MOD-SP2): 29.4 ML/M2
BH CV ECHO MEAS - SI(MOD-SP4): 29.9 ML/M2
BH CV ECHO MEAS - SUP REN AO DIAM: 1.7 CM
BH CV ECHO MEAS - SV(LVOT): 49.6 ML
BH CV ECHO MEAS - SV(MOD-SP2): 60 ML
BH CV ECHO MEAS - SV(MOD-SP4): 61 ML
BH CV ECHO MEAS - SV(RVOT): 38.7 ML
BH CV ECHO MEAS - TAPSE (>1.6): 1.67 CM
BH CV ECHO MEAS - TR MAX PG: 7.9 MMHG
BH CV ECHO MEAS - TR MAX VEL: 140.6 CM/SEC
BH CV ECHO MEASUREMENTS AVERAGE E/E' RATIO: 9.13
BH CV XLRA - RV BASE: 2.5 CM
BH CV XLRA - RV LENGTH: 6 CM
BH CV XLRA - RV MID: 2.06 CM
BH CV XLRA - TDI S': 11.9 CM/SEC
LEFT ATRIUM VOLUME INDEX: 17.6 ML/M2
MAXIMAL PREDICTED HEART RATE: 178 BPM
SINUS: 3 CM
STJ: 2.4 CM
STRESS TARGET HR: 151 BPM

## 2022-05-11 PROCEDURE — 25010000002 PERFLUTREN (DEFINITY) 8.476 MG IN SODIUM CHLORIDE (PF) 0.9 % 10 ML INJECTION: Performed by: INTERNAL MEDICINE

## 2022-05-11 PROCEDURE — 93356 MYOCRD STRAIN IMG SPCKL TRCK: CPT | Performed by: INTERNAL MEDICINE

## 2022-05-11 PROCEDURE — 93306 TTE W/DOPPLER COMPLETE: CPT

## 2022-05-11 PROCEDURE — 93356 MYOCRD STRAIN IMG SPCKL TRCK: CPT

## 2022-05-11 PROCEDURE — 93306 TTE W/DOPPLER COMPLETE: CPT | Performed by: INTERNAL MEDICINE

## 2022-05-11 PROCEDURE — 93000 ELECTROCARDIOGRAM COMPLETE: CPT | Performed by: INTERNAL MEDICINE

## 2022-05-11 PROCEDURE — 99214 OFFICE O/P EST MOD 30 MIN: CPT | Performed by: INTERNAL MEDICINE

## 2022-05-11 RX ADMIN — PERFLUTREN 1.5 ML: 6.52 INJECTION, SUSPENSION INTRAVENOUS at 08:49

## 2022-05-11 NOTE — PROGRESS NOTES
Date of Office Visit: 2022  Encounter Provider: Tasha Bustos MD  Place of Service: [facility] CARDIOLOGY  Patient Name: Carole Weaver  :1980      Patient ID:  Carole Weaver is a 41 y.o. female is here for  followup for chemotherapy.        History of Present Illness    She has a history of primary rectal cancer which was surgically resected by Dr. Ольга Ye 2019.  She was started on FOLFOX 6 (leucovorin, fluorouracil and oxaliplatin) 2020 followed by Dr Nguyen.  Follow-up PET scan 2020 showed no metastatic disease in the chest abdomen and pelvis but a stable 6 mm right upper lung pulmonary nodule.  She had a low anterior colon resection with coloanal anastomosis done 8/3/2020.  On 2020, there is a new 8 mm noncalcified pulmonary nodule in the left lower lobe of the lung.  The 6 mm prior pulmonary nodule stable.  PET scan on 2020 showed multiple hypermetabolic small pulmonary nodules.  She was started on pelvic chemotherapy with FOLFIRI (leucovorin, fluorouracil and irinotecan) on 10/13/2020.  She developed peripheral neuropathy and so irinotecan was stopped but then resumed.  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  She had telemedicine visit with [facility] 2021.  They evaluated her chemotherapeutic plan and agreed with treatment for palliative chemotherapy followed by maintenance chemotherapy.  She was on FOLFIRI and bevacizumab.      She is heterozygous for factor V Leiden and has been on prophylactic anticoagulation with Lovenox.  She had 3 pulmonary emboli diagnosed 2019 at that time she was on low-dose Lovenox.  She was then placed on Xarelto which was then made subtherapeutic as they lowered the dose.  She then developed SVC and left brachiocephalic thrombus and was switched to weight-based Lovenox  every 12 hours.     I did review a CTA from 12/2020.  There is no coronary artery calcification.     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  She is smoking 1 ppd cigarettes a day uses no alcohol or drugs and has rare caffeine.     Her father has atrial fibrillation, mother has hypertension.  There is no family history of premature cancer.     Echo done 11/11/2021 shows ejection 51-55% with global longitudinal strain -21.7%, grade 1 diastolic dysfunction and mild concentric left ventricular hypertrophy.  This is no change from her baseline echocardiogram done April 2021. She saw Dr. Nguyen, oncology on 9/28/2021.  At that visit, Dr. Nguyen recommended a 3-month chemotherapy break.  Prior to this she had been on 5-fluorouracil/leucovorin plus avastin.     Echo done 5/11/2022 showed ejection fraction greater than 70%, normal global longitudinal strain, no valvular abnormalities and normal diastolic function.  She saw Dr. Ford on 5/10/2022.  She had a CT scan on 3/15/2022 showing slight progression of small pulmonary nodule so she was started back on maintenance chemotherapy on 3/20/2022 with 5-fluorouracil, leucovorin and Avastin.  She will be getting 6 treatments and has already gotten 4 with tumor remaining.  She has gained fluid because of the chemo which is typical for her.  She has no orthopnea or PND but because of the fluid gain, she is a little bit short winded.  She has no orthopnea or PND.  She has no heart racing or skipping and she had no dizziness or syncope.  She has chronic diarrhea because of her ileostomy.  She has had no vomiting.  She has no fevers or chills.    Past Medical History:   Diagnosis Date   • Abdominopelvic abscess (HCC) 08/12/2020    ADMITTED TO [facility]   • Abnormal Pap smear of cervix 02/02/1998    JULIUS I   • Anemia in pregnancy    • Anemia in pregnancy    • Anxiety    • Bilateral epiphora 04/2020   • Bilateral hand  swelling 05/02/2020    SEEN AT [facility] ER   • Cervical lymphadenitis 06/06/2012    SEEN AT [facility] ER   • Chemotherapy induced diarrhea 01/2021   • Chemotherapy induced neutropenia (HCC) 04/2020   • Chemotherapy-induced nausea 02/2020   • Chemotherapy-induced thrombocytopenia 05/2020   • Chronic diarrhea    • Colon polyps     FOLLOWED BY DR. GERONIMO ESPARZA   • Cystitis 04/24/2020    WITH DEHYDRATION, ADMITTED TO [facility]   • Cystitis 10/27/2020   • Depression    • Drug-induced peripheral neuropathy ([facility]) 05/2020   • Factor V Leiden mutation ([facility])    • Fistula of intestine    • GERD (gastroesophageal reflux disease)    • Hand foot syndrome 05/2020   • Heart murmur     IN CHILDHOOD   • Hematochezia    • Hemorrhoids    • Heterozygous factor V Leiden mutation ([facility])     DX 8-2-2019   • History of anemia    • History of chemotherapy 2019    FOLLOWED BY DR. ALEXANDRU HUNT   • History of gestational diabetes    • History of pre-eclampsia 1998   • History of radiation therapy 2019    FOLLOWED BY DR. JAVON LEWIS   • History of TB skin testing 01/17/2009    TB Skin Test   • HPV in female 1998   • Hypokalemia 09/2019   • Hypomagnesemia 09/2019   • Hyponatremia 06/2021   • IBS (irritable bowel syndrome)    • Ileostomy in place ([facility])     FOLLOWED BY DR. 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   • Perirectal abscess 08/12/2020   • Pulmonary embolism without acute cor pulmonale (HCC) 09/20/2019    X 3; ADMITTED TO [facility]   • Pulmonary nodule, right 05/2020   • Rectal cancer ([facility]) 07/08/2019    STAGE IIA, INVASIVE MODERATELY DIFFERENTIATED ADENOCARCINOMA, CHEMO AND XRT  FINISHED 2019   • Right shoulder pain 2020    SEEN AT [facility] ER   • Right ureteral stone 10/01/2019    SEEN AT [facility] ER   • SAB (spontaneous ) 1996     A2-1 INDUCED   • Sciatica    • Sepsis due to cellulitis (HCC) 2002    LEFT GREAT TOE, ADMITTED TO [facility]   • Tachycardia 2020   • Thrombosis of superior vena cava (HCC) 2020    AND BRACHIOCEPHALIC VEIN, ADMITTED TO [facility]   • Urinary urgency 2020         Past Surgical History:   Procedure Laterality Date   • ABDOMINAL SURGERY     • CHOLECYSTECTOMY N/A 10/10/2003    LAPAROSCOPIC WITH CHOLANGIOGRAM, DR. JAMEY TALAVERA AT [facility]   • COLON RESECTION N/A 2019    Procedure: laparoscopic low anterior colon resection with mobilization of splenic flexure and diverting loop ileostomy: ERAS;  Surgeon: Padmaja Esparza MD;  Location: [facility];  Service: General   • COLON RESECTION N/A 8/3/2020    Procedure: LOW ANTERIOR COLON RESECTION, COLOANAL ANASTOMOSIS, MOBILIZATION SPLENIC FLEXURE;  Surgeon: Padmaja Esparza MD;  Location: [facility] OR;  Service: General;  Laterality: N/A;   • COLONOSCOPY N/A 7/15/2020    PATENT ANASTAMOSIS IN MID RECTUM, RESCOPE IN 1 YR, DR. PADMAJA ESPARZA AT [facility]   • COLONOSCOPY W/ POLYPECTOMY N/A 2019    15 MM TUBULOVILLOUS ADENOMA POLYP IN HEPATIC FLEXURE, 20 MMTUBULOVILLOUS ADENOMA WITH HIGH GRADE DYSPLASIA IN RECTOSIGMOID, 4 CM MASS IN MID RECTUM, PATH: INVASIVE MODERATELY DIFFERENTIATED ADENOCARCINOMA, DR. JENNIFER LI AT [facility]   • DILATATION AND EVACUATION N/A    • ENDOSCOPY N/A 2019    LA GRADE A ESOPHAGITIS, GASTRITIS, ALL BIOPSIES BENIGN, DR. JENNIFER LI AT [facility]   • INCISION AND DRAINAGE PERIRECTAL ABSCESS N/A 2020    Procedure: INCISION AND DRAINAGE OF retrorectal dehiscence abcess with drain placement and irrigation;  Surgeon: Padmaja Esparza MD;  Location: [facility];  Service: General;  Laterality: N/A;   • PAP SMEAR N/A 2014   •  SIGMOIDOSCOPY N/A 7/24/2019    INFILTRATIVE PARTIALLY OBSTRUCTING LARGE RECTAL CANCER, AREA TATOOED, DR. PADMAJA ESPARZA AT [facility]   • SIGMOIDOSCOPY N/A 11/23/2019    AN ULCERATED PARTIALLY OBSTRUCTING MASS IN MID RECTUM, AREA TATTOOED, DR. PADMAJA ESPARZA AT [facility]   • SIGMOIDOSCOPY N/A 7/22/2021    PATENT ANASTAMOSIS IN DISTAL RECTUM, RESCOPE IN 1 YR, DR. PADMAJA ESPARZA AT [facility]   • TRANSRECTAL ULTRASOUND N/A 7/24/2019    Procedure: ULTRASOUND TRANSRECTAL;  Surgeon: Padmaja Esparza MD;  Location: [facility] ENDOSCOPY;  Service: General   • URETEROSCOPY LASER LITHOTRIPSY WITH STENT INSERTION Right 10/30/2019    DR. ESTUARDO BEASLEY AT [place]   • VAGINAL DELIVERY N/A 09/18/1998   • VENOUS ACCESS DEVICE (PORT) INSERTION Left 8/9/2019    Procedure: INSERTION VENOUS ACCESS DEVICE;  Surgeon: Sj Joseph MD;  Location: [facility] OR [facility];  Service: General   • VENOUS ACCESS DEVICE (PORT) INSERTION N/A 12/18/2020    Procedure: INSERTION VENOUS ACCESS DEVICE right side, removal venous access device left side;  Surgeon: Sj Joseph MD;  Location: [facility] OR [facility];  Service: General;  Laterality: N/A;   • WISDOM TOOTH EXTRACTION Bilateral 1993       Current Outpatient Medications on File Prior to Visit   Medication Sig Dispense Refill   • clonazePAM (KlonoPIN) 1 MG tablet TAKE 1 TABLET BY MOUTH 3 TIMES A DAY AS NEEDED FOR ANXIETY OR SEIZURES 90 tablet 1   • Cyanocobalamin (VITAMIN B-12 PO) Take  by mouth.     • dicyclomine (BENTYL) 20 MG tablet TAKE 1 TABLET BY MOUTH EVERY 6 HOURS AS NEEDED 360 tablet 0   • diphenoxylate-atropine (LOMOTIL) 2.5-0.025 MG per tablet Take 1 tablet by mouth 4 (Four) Times a Day As Needed for Diarrhea. 120 tablet 1   • enoxaparin (LOVENOX) 100 MG/ML solution syringe INJECT 0.9 ML UNDER THE SKIN INTO THE APPROPRIATE AREA AS DIRECTED EVERY 12 (TWELVE) HOURS. 60 each 2   • escitalopram (LEXAPRO) 20 MG tablet TAKE 1 TABLET BY MOUTH EVERY DAY 90 tablet 3   • famotidine (PEPCID) 20 MG tablet TAKE 1 TABLET BY MOUTH  TWICE A  tablet 1   • fluorouracil in dextrose 5 % 250 mL infusion Infuse  into a venous catheter 1 (One) Time. Takes twice a month.     • Loratadine 10 MG capsule Take 10 mg by mouth Every Evening.     • metoprolol succinate XL (TOPROL-XL) 25 MG 24 hr tablet Take 0.5 tablets by mouth Every Night. 45 tablet 3   • mineral oil-hydrophilic petrolatum (AQUAPHOR) ointment Apply 1 application topically to the appropriate area as directed As Needed for Dry Skin.     • ondansetron (ZOFRAN) 8 MG tablet TAKE 1 TABLET BY MOUTH 3 (THREE) TIMES A DAY AS NEEDED FOR NAUSEA OR VOMITING. 60 tablet 0   • promethazine (PHENERGAN) 25 MG tablet Take 1 tablet by mouth Every 6 (Six) Hours As Needed for Nausea or Vomiting. 60 tablet 2     Current Facility-Administered Medications on File Prior to Visit   Medication Dose Route Frequency Provider Last Rate Last Admin   • [COMPLETED] bevacizumab (AVASTIN) 900 mg in sodium chloride 0.9 % 136 mL chemo IVPB  900 mg Intravenous Once Dong Nguyen MD PhD   Stopped at 05/10/22 1023   • [COMPLETED] leucovorin 810 mg in sodium chloride 0.9 % 331 mL IVPB  400 mg/m2 (Treatment Plan Recorded) Intravenous Once Dong Nguyen MD PhD   Stopped at 05/10/22 1120   • [COMPLETED] perflutren (DEFINITY) 8.476 mg in Sodium Chloride (PF) 0.9 % 10 mL injection  1.5 mL Intravenous Once in imaging Tasha Bustos MD   1.5 mL at 05/11/22 0849   • [COMPLETED] sodium chloride 0.9 % infusion 250 mL  250 mL Intravenous Once Dong Nguyen MD PhD   Stopped at 05/10/22 1121   • [DISCONTINUED] fluorouracil (ADRUCIL) 4,870 mg, sodium chloride 0.9 % 142.6 mL 240 mL chemo infusion - FOR HOME USE  2,400 mg/m2 (Treatment Plan Recorded) Intravenous Once Dong Nguyen MD PhD   4,870 mg at 05/10/22 1125   • [DISCONTINUED] heparin injection 500 Units  500 Units Intravenous PRN Dong Nguyen MD PhD       • [DISCONTINUED] sodium chloride 0.9 % flush 10 mL  10 mL Intravenous PRN Dong Nguyen MD PhD      "      Social History     Socioeconomic History   • Marital status:      Spouse name: Justus   • Number of children: 1   • Years of education: College   Tobacco Use   • Smoking status: Current Every Day Smoker     Packs/day: 1.00     Years: 24.00     Pack years: 24.00     Types: Electronic Cigarette, Cigarettes   • Smokeless tobacco: Never Used   • Tobacco comment: 1 PPD   Vaping Use   • Vaping Use: Some days   • Substances: Nicotine   • Devices: Disposable   Substance and Sexual Activity   • Alcohol use: Not Currently     Comment: Caffeine use rare   • Drug use: No   • Sexual activity: Defer           ROS    Procedures    ECG 12 Lead    Date/Time: 5/11/2022 9:54 AM  Performed by: Tasha Bustos MD  Authorized by: Tasha Bustos MD   Comparison: compared with previous ECG   Similar to previous ECG  Rhythm: sinus rhythm    Clinical impression: normal ECG                Objective:      Vitals:    05/11/22 0937   BP: 118/70   Pulse: 74   Weight: 98 kg (216 lb)   Height: 162.6 cm (64\")     Body mass index is 37.08 kg/m².    Vitals reviewed.   Constitutional:       General: Not in acute distress.     Appearance: Well-developed. Not diaphoretic.   Eyes:      General: No scleral icterus.     Conjunctiva/sclera: Conjunctivae normal.   HENT:      Head: Normocephalic and atraumatic.   Neck:      Thyroid: No thyromegaly.      Vascular: No carotid bruit or JVD.      Lymphadenopathy: No cervical adenopathy.   Pulmonary:      Effort: Pulmonary effort is normal. No respiratory distress.      Breath sounds: Normal breath sounds. No wheezing. No rhonchi. No rales.   Chest:      Chest wall: Not tender to palpatation.   Cardiovascular:      Normal rate. Regular rhythm.      Murmurs: There is no murmur.      No gallop.   Pulses:     Intact distal pulses.   Edema:     Peripheral edema absent.   Abdominal:      General: Bowel sounds are normal. There is no distension or abdominal bruit.      Palpations: Abdomen is " "soft. There is no abdominal mass.      Tenderness: There is no abdominal tenderness.   Musculoskeletal:         General: No deformity.      Extremities: No clubbing present.     Cervical back: Neck supple. Skin:     General: Skin is warm and dry. There is no cyanosis.      Coloration: Skin is not pale.      Findings: No rash.   Neurological:      Mental Status: Alert and oriented to person, place, and time.      Cranial Nerves: No cranial nerve deficit.   Psychiatric:         Judgment: Judgment normal.         Lab Review:       Assessment:      Diagnosis Plan   1. Primary hypertension     2. Adenocarcinoma of rectum (HCC)       1. Adenocarcinoma of the rectum, chemotherapeutics as noted above.  The bevacizumab (Avastin) is a recombinant antibody targeting VEGF receptor binding.  The fluorouracil as an antimetabolite.  These can increase risk of coronary artery disease, myocardial infarction and cardiomyopathy.  2. Factor V Leiden with history of DVTs and pulmonary emboli, on therapeutic Lovenox.  3. Tobacco use.  Advised cessation.  4. Sinus tachycardia.  Better  5. Mild LVH with grade 1 diastolic dysfunction - normalized on metoprolol succinate 12.5 mg nightly.  She is tolerating this well.  6. Hypotension and severe fatigue with lisinopril 5 mg daily, this was stopped.        Plan:       See back in 6 months, will not repeat echo at that time unless she is having symptoms.  Her current edema is due to her chemotherapy.  No medication changes.    STOP-Bang Score  Have you been diagnosed with Sleep Apnea?: no  Snoring?: no  Tired?: yes  Observed?: no  Pressure?: no  Stop Score: 1  Body Mass Index more than 35 kg/m2?: yes  Age older than 50 year old?: no  Neck large? \">17\"/43cm-M, >16\"/41cm-F: no  Gender=Male?: no  Total Stop-Bang Score: 2    "

## 2022-05-12 ENCOUNTER — TELEPHONE (OUTPATIENT)
Dept: ONCOLOGY | Facility: CLINIC | Age: 42
End: 2022-05-12

## 2022-05-12 ENCOUNTER — INFUSION (OUTPATIENT)
Dept: ONCOLOGY | Facility: HOSPITAL | Age: 42
End: 2022-05-12

## 2022-05-12 DIAGNOSIS — C20 ADENOCARCINOMA OF RECTUM: Primary | ICD-10-CM

## 2022-05-12 DIAGNOSIS — Z45.2 ENCOUNTER FOR FITTING AND ADJUSTMENT OF VASCULAR CATHETER: ICD-10-CM

## 2022-05-12 PROCEDURE — 25010000002 HEPARIN LOCK FLUSH PER 10 UNITS: Performed by: INTERNAL MEDICINE

## 2022-05-12 RX ORDER — SODIUM CHLORIDE 0.9 % (FLUSH) 0.9 %
10 SYRINGE (ML) INJECTION AS NEEDED
Status: CANCELLED | OUTPATIENT
Start: 2022-05-12

## 2022-05-12 RX ORDER — SODIUM CHLORIDE 0.9 % (FLUSH) 0.9 %
10 SYRINGE (ML) INJECTION AS NEEDED
Status: DISCONTINUED | OUTPATIENT
Start: 2022-05-12 | End: 2022-05-12 | Stop reason: HOSPADM

## 2022-05-12 RX ORDER — HEPARIN SODIUM (PORCINE) LOCK FLUSH IV SOLN 100 UNIT/ML 100 UNIT/ML
500 SOLUTION INTRAVENOUS AS NEEDED
Status: DISCONTINUED | OUTPATIENT
Start: 2022-05-12 | End: 2022-05-12 | Stop reason: HOSPADM

## 2022-05-12 RX ORDER — HEPARIN SODIUM (PORCINE) LOCK FLUSH IV SOLN 100 UNIT/ML 100 UNIT/ML
500 SOLUTION INTRAVENOUS AS NEEDED
Status: CANCELLED | OUTPATIENT
Start: 2022-05-12

## 2022-05-12 RX ADMIN — Medication 10 ML: at 12:56

## 2022-05-12 RX ADMIN — Medication 500 UNITS: at 12:56

## 2022-05-12 NOTE — TELEPHONE ENCOUNTER
Caller: Carole Weaver    Relationship to patient: Self    Best call back number: [phone]    Chief complaint: PATIENT CALLED TO HAS UNHOOK MOVED FROM 11:15 TO 1, WARM TRANSFERRED TO TONYA, SHE CHANGED IT TO 1PM AND I INFORMED THE PT.

## 2022-05-24 ENCOUNTER — INFUSION (OUTPATIENT)
Dept: ONCOLOGY | Facility: HOSPITAL | Age: 42
End: 2022-05-24

## 2022-05-24 ENCOUNTER — OFFICE VISIT (OUTPATIENT)
Dept: ONCOLOGY | Facility: CLINIC | Age: 42
End: 2022-05-24

## 2022-05-24 VITALS
HEIGHT: 65 IN | HEART RATE: 94 BPM | TEMPERATURE: 97.1 F | DIASTOLIC BLOOD PRESSURE: 88 MMHG | WEIGHT: 219.7 LBS | SYSTOLIC BLOOD PRESSURE: 130 MMHG | RESPIRATION RATE: 16 BRPM | BODY MASS INDEX: 36.6 KG/M2 | OXYGEN SATURATION: 95 %

## 2022-05-24 DIAGNOSIS — D68.51 HETEROZYGOUS FACTOR V LEIDEN MUTATION: Chronic | ICD-10-CM

## 2022-05-24 DIAGNOSIS — C20 ADENOCARCINOMA OF RECTUM: Primary | Chronic | ICD-10-CM

## 2022-05-24 DIAGNOSIS — C20 ADENOCARCINOMA OF RECTUM: Primary | ICD-10-CM

## 2022-05-24 DIAGNOSIS — Z79.01 CHRONIC ANTICOAGULATION: Chronic | ICD-10-CM

## 2022-05-24 DIAGNOSIS — L27.1 HAND FOOT SYNDROME: Chronic | ICD-10-CM

## 2022-05-24 DIAGNOSIS — C20 ADENOCARCINOMA OF RECTUM: ICD-10-CM

## 2022-05-24 LAB
ALBUMIN SERPL-MCNC: 3.7 G/DL (ref 3.5–5.2)
ALBUMIN/GLOB SERPL: 1.2 G/DL (ref 1.1–2.4)
ALP SERPL-CCNC: 86 U/L (ref 38–116)
ALT SERPL W P-5'-P-CCNC: 34 U/L (ref 0–33)
ANION GAP SERPL CALCULATED.3IONS-SCNC: 8.6 MMOL/L (ref 5–15)
AST SERPL-CCNC: 19 U/L (ref 0–32)
BASOPHILS # BLD AUTO: 0.03 10*3/MM3 (ref 0–0.2)
BASOPHILS NFR BLD AUTO: 0.5 % (ref 0–1.5)
BILIRUB SERPL-MCNC: 0.2 MG/DL (ref 0.2–1.2)
BILIRUB UR QL STRIP: NEGATIVE
BUN SERPL-MCNC: 10 MG/DL (ref 6–20)
BUN/CREAT SERPL: 13.2 (ref 7.3–30)
CALCIUM SPEC-SCNC: 9.3 MG/DL (ref 8.5–10.2)
CHLORIDE SERPL-SCNC: 105 MMOL/L (ref 98–107)
CLARITY UR: CLEAR
CO2 SERPL-SCNC: 25.4 MMOL/L (ref 22–29)
COLOR UR: YELLOW
CREAT SERPL-MCNC: 0.76 MG/DL (ref 0.6–1.1)
DEPRECATED RDW RBC AUTO: 58.8 FL (ref 37–54)
EGFRCR SERPLBLD CKD-EPI 2021: 100.5 ML/MIN/1.73
EOSINOPHIL # BLD AUTO: 0.29 10*3/MM3 (ref 0–0.4)
EOSINOPHIL NFR BLD AUTO: 4.7 % (ref 0.3–6.2)
ERYTHROCYTE [DISTWIDTH] IN BLOOD BY AUTOMATED COUNT: 16.1 % (ref 12.3–15.4)
GLOBULIN UR ELPH-MCNC: 3 GM/DL (ref 1.8–3.5)
GLUCOSE SERPL-MCNC: 115 MG/DL (ref 74–124)
GLUCOSE UR STRIP-MCNC: NEGATIVE MG/DL
HCT VFR BLD AUTO: 46.9 % (ref 34–46.6)
HGB BLD-MCNC: 15.3 G/DL (ref 12–15.9)
HGB UR QL STRIP.AUTO: ABNORMAL
IMM GRANULOCYTES # BLD AUTO: 0.01 10*3/MM3 (ref 0–0.05)
IMM GRANULOCYTES NFR BLD AUTO: 0.2 % (ref 0–0.5)
KETONES UR QL STRIP: NEGATIVE
LEUKOCYTE ESTERASE UR QL STRIP.AUTO: NEGATIVE
LYMPHOCYTES # BLD AUTO: 1.13 10*3/MM3 (ref 0.7–3.1)
LYMPHOCYTES NFR BLD AUTO: 18.3 % (ref 19.6–45.3)
MCH RBC QN AUTO: 32.6 PG (ref 26.6–33)
MCHC RBC AUTO-ENTMCNC: 32.6 G/DL (ref 31.5–35.7)
MCV RBC AUTO: 99.8 FL (ref 79–97)
MONOCYTES # BLD AUTO: 0.56 10*3/MM3 (ref 0.1–0.9)
MONOCYTES NFR BLD AUTO: 9 % (ref 5–12)
NEUTROPHILS NFR BLD AUTO: 4.17 10*3/MM3 (ref 1.7–7)
NEUTROPHILS NFR BLD AUTO: 67.3 % (ref 42.7–76)
NITRITE UR QL STRIP: NEGATIVE
NRBC BLD AUTO-RTO: 0.3 /100 WBC (ref 0–0.2)
PH UR STRIP.AUTO: 5.5 [PH] (ref 4.5–8)
PLATELET # BLD AUTO: 163 10*3/MM3 (ref 140–450)
PMV BLD AUTO: 8.8 FL (ref 6–12)
POTASSIUM SERPL-SCNC: 4.2 MMOL/L (ref 3.5–4.7)
PROT SERPL-MCNC: 6.7 G/DL (ref 6.3–8)
PROT UR QL STRIP: NEGATIVE
RBC # BLD AUTO: 4.7 10*6/MM3 (ref 3.77–5.28)
SODIUM SERPL-SCNC: 139 MMOL/L (ref 134–145)
SP GR UR STRIP: >=1.03 (ref 1–1.03)
UROBILINOGEN UR QL STRIP: ABNORMAL
WBC NRBC COR # BLD: 6.19 10*3/MM3 (ref 3.4–10.8)

## 2022-05-24 PROCEDURE — 25010000002 PALONOSETRON PER 25 MCG: Performed by: NURSE PRACTITIONER

## 2022-05-24 PROCEDURE — 80053 COMPREHEN METABOLIC PANEL: CPT

## 2022-05-24 PROCEDURE — 96413 CHEMO IV INFUSION 1 HR: CPT

## 2022-05-24 PROCEDURE — 96375 TX/PRO/DX INJ NEW DRUG ADDON: CPT

## 2022-05-24 PROCEDURE — 25010000002 FOSAPREPITANT PER 1 MG: Performed by: NURSE PRACTITIONER

## 2022-05-24 PROCEDURE — 81003 URINALYSIS AUTO W/O SCOPE: CPT

## 2022-05-24 PROCEDURE — 85025 COMPLETE CBC W/AUTO DIFF WBC: CPT

## 2022-05-24 PROCEDURE — 25010000002 BEVACIZUMAB PER 10 MG: Performed by: NURSE PRACTITIONER

## 2022-05-24 PROCEDURE — 99214 OFFICE O/P EST MOD 30 MIN: CPT | Performed by: NURSE PRACTITIONER

## 2022-05-24 PROCEDURE — 25010000002 BEVACIZUMAB 100 MG/4ML SOLUTION 4 ML VIAL: Performed by: NURSE PRACTITIONER

## 2022-05-24 PROCEDURE — 96367 TX/PROPH/DG ADDL SEQ IV INF: CPT

## 2022-05-24 PROCEDURE — 25010000002 DEXAMETHASONE SODIUM PHOSPHATE 100 MG/10ML SOLUTION: Performed by: NURSE PRACTITIONER

## 2022-05-24 PROCEDURE — 25010000002 FLUOROURACIL PER 500 MG: Performed by: NURSE PRACTITIONER

## 2022-05-24 PROCEDURE — 96416 CHEMO PROLONG INFUSE W/PUMP: CPT

## 2022-05-24 PROCEDURE — 25010000002 LEUCOVORIN 500 MG RECONSTITUTED SOLUTION 1 EACH VIAL: Performed by: NURSE PRACTITIONER

## 2022-05-24 RX ORDER — SODIUM CHLORIDE 9 MG/ML
250 INJECTION, SOLUTION INTRAVENOUS ONCE
Status: COMPLETED | OUTPATIENT
Start: 2022-05-24 | End: 2022-05-24

## 2022-05-24 RX ORDER — ENOXAPARIN SODIUM 100 MG/ML
1 INJECTION SUBCUTANEOUS EVERY 12 HOURS SCHEDULED
Qty: 60 ML | Refills: 2 | Status: SHIPPED | OUTPATIENT
Start: 2022-05-24

## 2022-05-24 RX ORDER — PALONOSETRON 0.05 MG/ML
0.25 INJECTION, SOLUTION INTRAVENOUS ONCE
Status: CANCELLED | OUTPATIENT
Start: 2022-05-24

## 2022-05-24 RX ORDER — SODIUM CHLORIDE 9 MG/ML
250 INJECTION, SOLUTION INTRAVENOUS ONCE
Status: CANCELLED | OUTPATIENT
Start: 2022-05-24

## 2022-05-24 RX ORDER — PALONOSETRON 0.05 MG/ML
0.25 INJECTION, SOLUTION INTRAVENOUS ONCE
Status: COMPLETED | OUTPATIENT
Start: 2022-05-24 | End: 2022-05-24

## 2022-05-24 RX ADMIN — FLUOROURACIL 4870 MG: 50 INJECTION, SOLUTION INTRAVENOUS at 11:32

## 2022-05-24 RX ADMIN — SODIUM CHLORIDE 250 ML: 9 INJECTION, SOLUTION INTRAVENOUS at 09:36

## 2022-05-24 RX ADMIN — DEXAMETHASONE SODIUM PHOSPHATE 12 MG: 10 INJECTION, SOLUTION INTRAMUSCULAR; INTRAVENOUS at 09:36

## 2022-05-24 RX ADMIN — PALONOSETRON 0.25 MG: 0.05 INJECTION, SOLUTION INTRAVENOUS at 09:36

## 2022-05-24 RX ADMIN — SODIUM CHLORIDE 100 ML: 9 INJECTION, SOLUTION INTRAVENOUS at 09:51

## 2022-05-24 RX ADMIN — BEVACIZUMAB 900 MG: 400 INJECTION, SOLUTION INTRAVENOUS at 10:25

## 2022-05-24 RX ADMIN — LEUCOVORIN CALCIUM 810 MG: 500 INJECTION, POWDER, LYOPHILIZED, FOR SOLUTION INTRAMUSCULAR; INTRAVENOUS at 11:00

## 2022-05-24 NOTE — PROGRESS NOTES
REASON FOR FOLLOW UP:     1. Rectal cancer, rectal ultrasound examination in July 2019 reported T3N0 disease without lymphadenopathy. She does have small pulmonary nodule 6-7 mm and 2 mm with indeterminate feature. There is no solid evidence of metastatic disease otherwise. Patient has stage IIA (T3N0M0) disease.  2. The patient is heterozygous factor V Leiden, was on prophylactic anticoagulation with Lovenox 40 mg daily given her increased risk of thrombosis with MediPort and GI malignancy.   3. PET scan on 8/8/2019 reported an 8 mm hypermetabolic right upper lobe pulmonary nodule, which is suspicious for metastatic as well.    4. Patient had a PowerPort placement on the left upper chest by Dr. Joseph on 8/9/2019.  5. Patient was started on concurrent infusional 5-FU chemoradiation therapy on 8/12/2019, with planned complete surgical resection and further adjuvant chemotherapy with intention to cure the disease.   6. Patient underwent surgical resection of the primary rectal cancer by Dr. Ye on 12/2/2019.  Pathology evaluation reported residual T3N1 disease stage IIIb.  7. Diarrhea related to therapy and radiation.   8. Patient was started cycle 1 day 1 of adjuvant FOLFOX 6 on 1/23/2020.  9. On 2/5/2020 FOLFOX 6 cycle 2 delayed secondary to neutropenia.  Patient was given 3 days of Granix injection.  After cycle #2 we planned 3 days of Granix with each cycle.   10. Patient also intolerant of oral iron.  Patient received 2 doses of IV injectafer on 02/05/2020 and 02/12/2020.   11. 02/12/2020 Proceed with cycle #2 of FOLFOX 6 with 3 days of Granix.  12. On 3/11/2020 cycle 4 postponed secondary to abdominal pain and occasional low-grade fevers.  CT scans ordered.  13. Cycle #4 resumed after CT scan revealed no evidence of disease.  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 Ye. patient has no fever.  Continue to observe.   14. Grade 3  hand-foot syndrome secondary to 5-FU after cycle #7 FOLFOX 6 chemotherapy, prompting ER visit.  Also has worsening peripheral neuropathy.   15. Cycle #8 FOLFOX 6 was given on 5/13/2020, with 50% dose reduction for both 5-FU and oxaliplatin, and also examination of bolus 5-FU.   16. PET scan examination on 5/21/2020 reported no evidence of metastatic disease in the chest abdomen pelvis.  Stable 6 mm RUL pulmonary nodule.  17. On 5/27/2020, I discussed with the patient that we can discontinue chemotherapy at this time.   18. Patient had a surgical procedure for low anterior colon resection, coloanal anastomosis on 8/3/2020.  19. CT scan for chest abdomen pelvis on 9/9/2020 reported a new 8 mm noncalcified pulmonary nodule in the left lower lobe of the lung.  Otherwise stable right upper lobe 6 mm pulmonary nodule, and no evidence of disease recurrence in the abdomen or pelvis.  20. PET scan examination on 9/18/2020 reported multiple hypermetabolic small pulmonary nodules/ new pulmonary nodules.   21. Patient was started on pelvic chemotherapy with FOLFIRI regimen on 10/13/2020.   22. 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 It reported tumor mutation burden 5 Muts/Mb, microsatellite stable, TP53 mutation R282W, and others.   23. Cycle #5 was without irinotecan, due to peripheral neuropathy.  24. 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  Patient was switched to weight-based Lovenox injection.  25. Cycle #6 5-FU and irinotecan was delayed by 2 weeks because of the above incident.  26. Patient had a telemedicine evaluation at that the [facility] [place] in February 2021.  They agreed with our treatment plan for palliative chemotherapy followed by maintenance chemotherapy.    27. PET scan examination on 4/6/2021 after cycle #12 palliative chemotherapy reported no  evidence of hypermetabolic metastatic lesion.   28. Patient was started on maintenance chemotherapy with 5-FU and Avastin on 4/13/2021. (Unable to tolerate Xeloda because of a significant hand-foot syndrome).   29. PET scan on 9/24/2021 obtained after cycle #12 maintenance chemotherapy with 5-FU, leucovorin, Avastin reported no evidence for active disease. We recommended 3 months chemotherapy holiday.  30. CT scan examination on 3/15/2022 reported slightly disease progression with increase in size of small pulmonary nodule.  We started patient back on maintenance chemotherapy on 3/29/2022 treatment with 5-FU plus leucovorin and Avastin, repeating every 2 weeks.      HISTORY OF PRESENT ILLNESS:  The patient is a 41 y.o. female with the above-mentioned history is here today for lab review and evaluation prior to cycle 29 chemotherapy.  Overall, she continues to tolerate treatment quite well.  She does have some mild issues with hand/foot syndrome starting the day after disconnect, lasting about 24 hours.  Presents as dryness, redness, and some mild fissuring of her skin.  She is using utterly smooth twice a day, regularly.  She denies mouth sores.  Her ostomy is functioning well.  She continues on Lovenox every 12 hours and has some mild bruising but denies any bleeding issues.  She has a good appetite.  She has no other new problems or concerns.      Past Medical History:   Diagnosis Date   • Abdominopelvic abscess (HCC) 08/12/2020    ADMITTED TO [facility]   • Abnormal Pap smear of cervix 02/02/1998    JULIUS I   • Anemia in pregnancy    • Anemia in pregnancy    • Anxiety    • Bilateral epiphora 04/2020   • Bilateral hand swelling 05/02/2020    SEEN AT [facility] ER   • Cervical lymphadenitis 06/06/2012    SEEN AT [facility] ER   • Chemotherapy induced diarrhea 01/2021   • Chemotherapy induced neutropenia (HCC) 04/2020   • Chemotherapy-induced nausea 02/2020   • Chemotherapy-induced thrombocytopenia 05/2020   • Chronic diarrhea    • Colon  polyps     FOLLOWED BY DR. GERONIMO ESPARZA   • Cystitis 2020    WITH DEHYDRATION, ADMITTED TO [facility]   • Cystitis 10/27/2020   • Depression    • Drug-induced peripheral neuropathy (HCC) 2020   • Factor V Leiden mutation ([facility])    • Fistula of intestine    • GERD (gastroesophageal reflux disease)    • Hand foot syndrome 2020   • Heart murmur     IN CHILDHOOD   • Hematochezia    • Hemorrhoids    • Heterozygous factor V Leiden mutation ([facility])     DX 2019   • History of anemia    • History of chemotherapy     FOLLOWED BY DR. ALEXANDRU HUNT   • History of gestational diabetes    • History of pre-eclampsia    • History of radiation therapy     FOLLOWED BY DR. JAVON LEWIS   • History of TB skin testing 2009    TB Skin Test   • HPV in female    • Hypokalemia 2019   • Hypomagnesemia 2019   • Hyponatremia 2021   • IBS (irritable bowel syndrome)    • Ileostomy in place ([facility])     FOLLOWED BY DR. GERONIMO ESPARZA   • Infected insect bite of neck 2016    SEEN AT [facility]   • Kidney stones 2007    SEEN AT [facility] ER   • Lump of right breast     SWOLLEN LYMPH NODE   • Lung cancer ([facility]) 2020    METASTATIC LUNG CANCER   • Macrocytosis 2021   • Monopolar depression ([facility])    • On anticoagulant therapy    • Pain associated with surgical procedure 2020   • Palmar-plantar erythrodysesthesia 2021   • Perirectal abscess 2020   • Pulmonary embolism without acute cor pulmonale ([facility]) 09/20/2019    X 3; ADMITTED TO [facility]   • Pulmonary nodule, right 2020   • Rectal cancer ([facility]) 2019    STAGE IIA, INVASIVE MODERATELY DIFFERENTIATED ADENOCARCINOMA, CHEMO AND XRT FINISHED 2019   • Right shoulder pain 2020    SEEN AT [facility] ER   • Right ureteral stone 10/01/2019    SEEN AT [facility] ER   • SAB (spontaneous ) 1996     A2-1 INDUCED   • Sciatica    • Sepsis due to cellulitis ([facility]) 2002    LEFT GREAT TOE, ADMITTED TO [facility]   • Tachycardia 2020   •  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 JAMEY TALAVERA AT [facility]   • COLON RESECTION N/A 12/2/2019    Procedure: laparoscopic low anterior colon resection with mobilization of splenic flexure and diverting loop ileostomy: ERAS;  Surgeon: Padmaja Esparza MD;  Location: [facility] OR;  Service: General   • COLON RESECTION N/A 8/3/2020    Procedure: LOW ANTERIOR COLON RESECTION, COLOANAL ANASTOMOSIS, MOBILIZATION SPLENIC FLEXURE;  Surgeon: Padmaja Esparza MD;  Location: [facility] MAIN OR;  Service: General;  Laterality: N/A;   • COLONOSCOPY N/A 7/15/2020    PATENT ANASTAMOSIS IN MID RECTUM, RESCOPE IN 1 YR, DR. PADMAJA ESPARZA AT [facility]   • COLONOSCOPY W/ POLYPECTOMY N/A 07/08/2019    15 MM TUBULOVILLOUS ADENOMA POLYP IN HEPATIC FLEXURE, 20 MMTUBULOVILLOUS ADENOMA WITH HIGH GRADE DYSPLASIA IN RECTOSIGMOID, 4 CM MASS IN MID RECTUM, PATH: INVASIVE MODERATELY DIFFERENTIATED ADENOCARCINOMA, DR. JENNIFER LI AT [facility]   • DILATATION AND EVACUATION N/A 2009   • ENDOSCOPY N/A 07/08/2019    LA GRADE A ESOPHAGITIS, GASTRITIS, ALL BIOPSIES BENIGN, DR. JENNIFER LI AT [facility]   • INCISION AND DRAINAGE PERIRECTAL ABSCESS N/A 8/14/2020    Procedure: INCISION AND DRAINAGE OF retrorectal dehiscence abcess with drain placement and irrigation;  Surgeon: Padmaja Esparza MD;  Location: [facility];  Service: General;  Laterality: N/A;   • PAP SMEAR N/A 01/24/2014   • SIGMOIDOSCOPY N/A 7/24/2019    INFILTRATIVE PARTIALLY OBSTRUCTING LARGE RECTAL CANCER, AREA TATOOED, DR. PADMAJA ESPARZA AT [facility]   • SIGMOIDOSCOPY N/A 11/23/2019    AN ULCERATED PARTIALLY OBSTRUCTING MASS IN MID RECTUM, AREA TATTOOED, DR. PADMAJA ESPARZA AT [facility]   • SIGMOIDOSCOPY N/A 7/22/2021    PATENT ANASTAMOSIS IN DISTAL  RECTUM, RESCOPE IN 1 YR, DR. GERONIMO YE AT [facility]   • TRANSRECTAL ULTRASOUND N/A 7/24/2019    Procedure: ULTRASOUND TRANSRECTAL;  Surgeon: Geronimo Ye MD;  Location: [facility] ENDOSCOPY;  Service: General   • URETEROSCOPY LASER LITHOTRIPSY WITH STENT INSERTION Right 10/30/2019    DR. ESTUARDO BEASLEY AT [place]   • VAGINAL DELIVERY N/A 09/18/1998   • VENOUS ACCESS DEVICE (PORT) INSERTION Left 8/9/2019    Procedure: INSERTION VENOUS ACCESS DEVICE;  Surgeon: Sj Joseph MD;  Location: [facility] OR [facility];  Service: General   • VENOUS ACCESS DEVICE (PORT) INSERTION N/A 12/18/2020    Procedure: INSERTION VENOUS ACCESS DEVICE right side, removal venous access device left side;  Surgeon: Sj Joseph MD;  [place] [facility] [place] [facility]  Service: General;  Laterality: N/A;   • WISDOM TOOTH EXTRACTION Bilateral 1993       HEMATOLOGIC/ONCOLOGIC HISTORY:      The patient reports she has intermittent rectal bleeding and urgency, mucous with her stool, starting sometime in 2016. At that time she was referred to GI service, and was diagnosed of irritable bowel syndrome. Nevertheless she had worsening urgency for bowel movement, and had a couple of incidents losing control of stool. She was recently seen by Roland Thorpe MD again and had colonoscopy and EGD exam on 07/08/2019. She was found having a circumferential rectal mass. According to the procedure note, the patient had a fungating circumferential bleeding 4 cm mass in the middle rectum, at distance between 13 cm and 17 cm from the anus. Mass was causing partial obstruction. There were also colon polyps found at the hepatic flexure and also at the rectosigmoid junction 23 cm from the anus. Both were resected and retrieved. EGD examination reported grade A esophagitis at the GE junction and patchy discontinuous erythema and aggravation of the mucosa without bleeding in the stomach body. There is normal mucosa of the duodenum. Pathology evaluation from this procedure reported  moderately differentiated adenocarcinoma involving the rectal mass. The rectal sigmoid junction polyp was tubular/tubulovillous adenoma with high grade dysplasia negative for invasive malignancy. The hepatic flexure polyp was also tubular/tubulovillous adenoma negative for high grade dysplasia or malignancy. The biopsy from the esophagus reports squamocolumnar mucosa with inflammatory changes suggestive of mild reflux esophagitis, negative for interstitial metaplasia. Gastric biopsy was benign and duodenal biopsy was also benign. There is no mention of H-pylori.     Patient was subsequently referred to colorectal surgeon Padmaja Ye MD for further evaluation. The patient had CT scan examination for chest, abdomen and pelvis requested by Dr. Ye and were done on 07/13/2019. The study reported no evidence of lymphadenopathy in the abdomen and pelvis. There was wall thickening of the rectosigmoid junction. Normal spleen, pancreas, adrenal glands and kidneys. There was fatty liver infiltration but no focal lymphatic lesions. There was a small 6-7 mm noncalcified nodule in the right upper lobe and a tiny 3 mm subpleural nodule in the right middle lobe. No mediastinal or hilar lymphadenopathy.     Dr. Ye performed staging rectal ultrasound examination. This study reported tumor penetrating through the muscularis propria as T3 disease; there were no lymph nodes identified.    She had a hospitalization in late September 2019 because of newly discovered pulmonary emboli.  She was on prophylactic Lovenox prior to the incident of PE.  Now she is on full dose Xarelto anticoagulation.  Patient reports no further chest pain dyspnea.  She denies bleeding or bruising.  During her hospitalization, she was seen by Dr. Ye, who plans to have surgery 8 to 12 weeks after finishing radiation therapy.  She finished her radiation on 9/19/2019.     I noticed patient also presented to the emergency room on 10/1/2019 complaining of  right flank area pain.  I reviewed the images studies and indeed she has a very small 1 to 2 mm obstructing kidney stone in the UV junction.  Patient is still symptomatic with some pains and dysuria.  She denies fever sweating or chills.    Laboratory study on 10/7/2019 reported normal CBC including hemoglobin 13.1, platelets 301,000, WBC 6170 and ANC 4900 lymphocytes 590 monocytes 480.      The nurse reported malfunction of port-a-catheter on 10/7/2019.  Port study on 10/8/2019 showed fibrin sheath around the distal aspect of the Mediport catheter in the SVC. This does not appear to hinder injection, but does prevent aspiration at this time.    Patient underwent surgical resection of the colon on 12/2/2019 with Dr. Ye.  Pathology evaluation reported residual T3 disease, also 1 out of 14 lymph nodes positive for malignancy.  So this patient in either had at least stage IIIb disease (T3 N1 M0?).      Adjuvant chemotherapy FOLFOX 6 will be started on 1/22/2020.  Laboratory study reported iron saturation 10%, free iron 31 TIBC 319 and ferritin 168.  Her hemoglobin was 11.8, WBC 5600, and platelets 347,000.    Patient was here on 02/12/2020 for cycle 2 of her FOLFOX.  This is delayed x1 week secondary to neutropenia.  The patient ultimately received 3 days worth of Neupogen with recovery of her blood counts.  Of note, the patient struggled with significant nausea without any episodes of vomiting following cycle #1 of chemotherapy resulting in significant weight loss.  She is up 12 pounds over the last week as her appetite has normalized.  We will add Emend to her care plan.    She is having several loose stools per her colostomy and has been hesitant to take Imodium due to prior history of constipation.  I encouraged her to try this with a maximum of 8 tablets/day.  She denies any infectious symptoms including fevers or chills.  No excess bleeding or bruising noted.  She had the expected cold sensitivity related to  the Oxaliplatin for about 3 days following treatment.    Labs from 02/12/2020 demonstrated total white blood cell count of 5.14, ANC of 3.06, hemoglobin of 11.2, platelets of 211,000.  She was found to be iron deficient last week and is intolerant to oral iron secondary to GI distress.  For this reason, she initiated IV iron therapy with Injectafer last week.  She had received her second dose 02/12/2020    Patient has normalized hemoglobin 12.2 on 2/26/2020.    She reported on 5/5/2020 she had a recent visit to the emergency department for what was suspected to be an allergic reaction.  She called our on-call service on Saturday with reports of hand and face swelling.  She was instructed to proceed to the emergency department at which time she was assessed and prescribed a Medrol Dosepak.  She had just completed her 5-FU and was unhooked on Friday, 5/3/2020.  Her symptoms have improved.  She does report persistent hyperpigmentation and mild swelling of the palms of the hands but this is much improved.  It was her right hand specifically that was swollen.  Her facial swelling has resolved.  She continues on Cefdinir nd since has 1 day remaining, she was prescribed cefdinir for a UTI requiring hospitalization at the end of April.  Reports no new symptoms.  Her labs are stable.  She is scheduled for treatment again.    Patient states at this time she is not tolerating her chemotherapy well.     Patient seen in the emergency department on 5/2/2020 for what was suspected to be an allergic reaction.  She called our on-call service on Saturday explaining that she was experiencing hand and face swelling.  She was instructed to proceed to the emergency department at which time she was assessed and prescribed a Medrol Dosepak.  She had just completed her 5-FU and was unhooked on Friday, 5/1/2020.      She reports since her ED visit on 5/2/2020 her symptoms have not improved. Her hands and feet were swollen upon presenting to  the ED and she could barely make a fist. She states that she feels so swollen she is not able to stand it. She states that her skin on the hands and feet are peeling extensively as well, besides changing color to more dark.     She also reports significant fatigue after her first week of the 5-FU treatment but she expected this side effect. She also notices significant increase in her neuropathy to the point that she is not able to even walk around in her home without her house slippers due to irritation from her rugs. She denies and associated nausea or vomiting at this time. She also has episodes of epistaxis every day after the chemotherapy cycle #7.  She does report working full-time during the week of chemotherapy.    Laboratory studies, 5/13/2020, show moderate thrombocytopenia platelets 123,000, low normal WBC 4140 including ANC 2720 and normal hemoglobin 13.4.  Because significant hand-foot syndrome, will decrease both 5-FU and oxaliplatin by 50%, and eliminate bolus dose of 5-FU.    On 5/21/2020 patient had a PET scan performed which showed no convincing evidence of residual disease in abdomen or pelvis, no metastatic disease within the chest or neck.     Cycle #8 FOLFOX 6 was given on 5/13/2020, with 50% dose reduction for both 5-FU and oxaliplatin, and also examination of bolus 5-FU.  She states on 5/27/2020 that with the recent reduction of the chemotherapy she feels significantly better. She has more energy and is able to do her daily routine.      PET scan examination on 5/21/2020 reported no evidence of metastatic disease in chest abdomen pelvis.  There was a stable 6 mm right upper lobe pulmonary nodule.    Laboratory studies on 5/27/2020 showed mild leukocytopenia WBC 3720 but a normal ANC 2250 and lymphocytes 630.  Normal platelets 163,000 and hemoglobin 12.6.  Chemistry lab reported normal renal function, liver function panel and a electrolytes, elevated glucose 164.    Laboratory studies  6/24/2020, showed normal hemoglobin 13.4 but macrocytosis .9.  Normal platelets 210,000 and WBC 5870.  She had normal CMP.     Patient last time was here in late June 2020.  Since that time she had surgical procedure for low anterior colon resection, coloanal anastomosis on 8/3/2020.  She later developed a perirectal abscess, required surgical drainage on 8/14/2020.  She was discharged on 8/27/2020.    Patient reports to me she still has lower abdominal wall vacuum suction in place.  She denies fever sweating chills.  Performance status is ECOG 1.  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  She does have visiting nurse come to the home for wound care.    CT scan for chest abdomen pelvis on 9/9/2020 reported a new 8 mm noncalcified pulmonary nodule in the left lower lobe.  Otherwise stable right upper lobe 6 mm pulmonary nodule, and no evidence of disease recurrence in the abdomen or pelvis.     Laboratory study on 9/16/2020 reported elevated AST 55, ALT 95, alk phosphatase 143 but normal total bilirubin 0.3.  Chemistry lab otherwise unremarkable.  She has completed normal CBC.      Because of the abnormal CT scan examination for chest abdomen pelvis on 9/9/2020 reported a new 8 mm noncalcified pulmonary nodule in the left lower lobe, we obtained a PET scan examination for further evaluation, which was done on 9/18/2020.  This study reported several pulmonary nodules, some of them are hypermetabolic, newly developed compared to previous PET scan in May 2020.  Certainly does highly suspicious for metastatic disease.  There are also hypermetabolic activity in the abdomen and pelvic area which is hard to differentiate from surgical scar versus metastatic malignancy.    Laboratory study on 10/13/2020 reported normal CBC with Hb 13.9, platelets 302,000 and WBC 5520 including ANC 3830.  Chemistry lab reported normalized AST 20, alk phosphatase 116 improved ALT 35, and maintains normal  bilirubin, with normal electrolytes and liver function panel.     Patient was started on first cycle of palliative chemotherapy FOLFIRI on 10/13/2020.    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  Patient had a new port placed 12/18/2020, and had held her Xarelto for 2 days prior to surgery.  She presented to the ER on 12/21/20 with complaints of facial and arm swelling for 3 days.  She also noted increased shortness of breath.  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  Thrombus within the right internal jugular vein cannot be excluded.  Patient was started on IV heparin, and eventually transitioned to weight-based Lovenox, which she now continues.    PET scan examination on 9/24/2021 reported further shrinking of the tiny right upper lobe pulmonary nodule.  Otherwise no new lesions.  No evidence for metastatic disease in other areas.      Patient had CT scan for chest with IV contrast obtained on 12/14/2021 which reported small tiny stable pulmonary nodules. There is a 4 mm right upper lobe pulmonary nodule. There is also a stable 4 mm left lower lobe pulmonary nodule. There is also stable left upper lobe micronodule.     Laboratory studies today on 12/21/2021 reported normal hemoglobin 15.9 however .0, platelets 218,000 WBC 5360 including neutrophils 3730 lymphocytes 980. Chemistry lab reported normal renal function, electrolytes, glucose, and marginally elevated ALT 55, AST 34 but normal total bilirubin and alk phosphatase.       MEDICATIONS    Current Outpatient Medications:   •  clonazePAM (KlonoPIN) 1 MG tablet, TAKE 1 TABLET BY MOUTH 3 TIMES A DAY AS NEEDED FOR ANXIETY OR SEIZURES, Disp: 90 tablet, Rfl: 1  •  Cyanocobalamin (VITAMIN B-12 PO), Take  by mouth., Disp: , Rfl:   •  dicyclomine (BENTYL) 20 MG  tablet, TAKE 1 TABLET BY MOUTH EVERY 6 HOURS AS NEEDED, Disp: 360 tablet, Rfl: 0  •  diphenoxylate-atropine (LOMOTIL) 2.5-0.025 MG per tablet, Take 1 tablet by mouth 4 (Four) Times a Day As Needed for Diarrhea., Disp: 120 tablet, Rfl: 1  •  escitalopram (LEXAPRO) 20 MG tablet, TAKE 1 TABLET BY MOUTH EVERY DAY, Disp: 90 tablet, Rfl: 3  •  famotidine (PEPCID) 20 MG tablet, TAKE 1 TABLET BY MOUTH TWICE A DAY, Disp: 180 tablet, Rfl: 1  •  fluorouracil in dextrose 5 % 250 mL infusion, Infuse  into a venous catheter 1 (One) Time. Takes twice a month., Disp: , Rfl:   •  Loratadine 10 MG capsule, Take 10 mg by mouth Every Evening., Disp: , Rfl:   •  metoprolol succinate XL (TOPROL-XL) 25 MG 24 hr tablet, Take 0.5 tablets by mouth Every Night., Disp: 45 tablet, Rfl: 3  •  mineral oil-hydrophilic petrolatum (AQUAPHOR) ointment, Apply 1 application topically to the appropriate area as directed As Needed for Dry Skin., Disp: , Rfl:   •  ondansetron (ZOFRAN) 8 MG tablet, TAKE 1 TABLET BY MOUTH 3 (THREE) TIMES A DAY AS NEEDED FOR NAUSEA OR VOMITING., Disp: 60 tablet, Rfl: 0  •  promethazine (PHENERGAN) 25 MG tablet, Take 1 tablet by mouth Every 6 (Six) Hours As Needed for Nausea or Vomiting., Disp: 60 tablet, Rfl: 2  •  Enoxaparin Sodium (LOVENOX) 100 MG/ML solution prefilled syringe syringe, Inject 1 mL under the skin into the appropriate area as directed Every 12 (Twelve) Hours., Disp: 60 mL, Rfl: 2  No current facility-administered medications for this visit.    Facility-Administered Medications Ordered in Other Visits:   •  bevacizumab (AVASTIN) 900 mg in sodium chloride 0.9 % 136 mL chemo IVPB, 900 mg, Intravenous, Once, Sheba Matias APRN  •  dexamethasone (DECADRON) IVPB 12 mg, 12 mg, Intravenous, Once, Sheba Matias APRN, Last Rate: 200 mL/hr at 05/24/22 0936, 12 mg at 05/24/22 0936  •  fluorouracil (ADRUCIL) 4,870 mg, sodium chloride 0.9 % 142.6 mL 240 mL chemo infusion - FOR HOME USE, 2,400 mg/m2  "(Treatment Plan Recorded), Intravenous, Once, Sheba Matias APRN  •  FOSAPREPITANT 150 MG/100ML NORMAL SALINE (CBC) IVPB 100 mL 100 mL, 150 mg, Intravenous, Once, Sheba Matias APRN  •  leucovorin 810 mg in sodium chloride 0.9 % 331 mL IVPB, 400 mg/m2 (Treatment Plan Recorded), Intravenous, Once, Sheba Matias APRN    ALLERGIES:   No Known Allergies    SOCIAL HISTORY:       Social History     Tobacco Use   • Smoking status: Current Every Day Smoker     Packs/day: 1.00     Years: 24.00     Pack years: 24.00     Types: Electronic Cigarette, Cigarettes   • Smokeless tobacco: Never Used   • Tobacco comment: 1 PPD   Vaping Use   • Vaping Use: Some days   • Substances: Nicotine   • Devices: Disposable   Substance Use Topics   • Alcohol use: Not Currently     Comment: Caffeine use rare   • Drug use: No         FAMILY HISTORY:   Mother has positive factor V Leiden mutation, although she did not have thrombosis, mother also is coronary disease with stenting, she also is occasional bruising.  Maternal grandmother had DVT, she had multiple surgical procedures.  Patient mother's half-brother had metastatic colon cancer at diagnosis in his 50s.  Maternal great aunt has breast cancer.  Patient will follow his skin cancer in his 60s, exclusive.         Vitals:    05/24/22 0849   BP: 130/88   Pulse: 94   Resp: 16   Temp: 97.1 °F (36.2 °C)   TempSrc: Temporal   SpO2: 95%   Weight: 99.7 kg (219 lb 11.2 oz)   Height: 165.1 cm (65\")   PainSc: 0-No pain     Current Status 5/24/2022   ECOG score 0     Physical Exam  Vitals reviewed.   Constitutional:       General: She is not in acute distress.     Appearance: Normal appearance. She is well-developed.   HENT:      Head: Normocephalic and atraumatic.      Comments: Patient wears mask due to the pandemic coronavirus infection.     Eyes:      General: No scleral icterus.     Conjunctiva/sclera: Conjunctivae normal.   Cardiovascular:      Rate and Rhythm: Normal rate and " regular rhythm.      Heart sounds: Normal heart sounds. No murmur heard.  Pulmonary:      Effort: Pulmonary effort is normal. No respiratory distress.      Breath sounds: Normal breath sounds.   Abdominal:      General: Bowel sounds are normal.      Palpations: Abdomen is soft.      Tenderness: There is no abdominal tenderness.      Comments: Ostomy in right abdomen. Scattered bruises on the abdomen bilaterally from Lovenox injections.   Musculoskeletal:         General: No swelling.      Cervical back: Normal range of motion.   Skin:     General: Skin is warm and dry.      Findings: Bruising present.      Comments: Left lower abdomen wall abscess has resolved.  No signs of infection locally.  Hyperpigmentation of the nailbeds with mild dryness of the skin.   Neurological:      Mental Status: She is alert and oriented to person, place, and time. Mental status is at baseline.   Psychiatric:         Mood and Affect: Mood normal.         Thought Content: Thought content normal.       05/24/2022 physical exam is unchanged from above except as updated.    RECENT LABS:      Results from last 7 days   Lab Units 05/24/22  0830   WBC 10*3/mm3 6.19   NEUTROS ABS 10*3/mm3 4.17   HEMOGLOBIN g/dL 15.3   HEMATOCRIT % 46.9*   PLATELETS 10*3/mm3 163       Results from last 7 days   Lab Units 05/24/22  0830   SODIUM mmol/L 139   POTASSIUM mmol/L 4.2   CHLORIDE mmol/L 105   CO2 mmol/L 25.4   BUN mg/dL 10   CREATININE mg/dL 0.76   CALCIUM mg/dL 9.3   ALBUMIN g/dL 3.70   BILIRUBIN mg/dL 0.2   ALK PHOS U/L 86   ALT (SGPT) U/L 34*   AST (SGOT) U/L 19   GLUCOSE mg/dL 115          Lab Results   Component Value Date    GLUCOSE 115 05/24/2022    BUN 10 05/24/2022    CREATININE 0.76 05/24/2022    EGFRIFNONA 79 12/21/2021    BCR 13.2 05/24/2022    K 4.2 05/24/2022    CO2 25.4 05/24/2022    CALCIUM 9.3 05/24/2022    ALBUMIN 3.70 05/24/2022    AST 19 05/24/2022    ALT 34 (H) 05/24/2022         IMAGING:      ASSESSMENT:   1.  Metastatic rectal  cancer.   · Initial rectal ultrasound examination reported T3N0 disease without lymphadenopathy.   · CT scan of chest, abdomen and pelvis reported no lymphadenopathy in the abdomen, pelvis or chest. She does have small pulmonary nodule 6-7 mm and 2 mm with indeterminate feature. There is no solid evidence of metastatic disease otherwise.   · Based on the CT scan and rectal ultrasound imaging studies, this patient had stage IIA (T3N0M0) disease.   · She had PET scan examination on 8/8/2019 which reported a hypermetabolic right upper lobe pulmonary nodule 6 mm with SUV 5.6.  This is suspicious for metastatic disease however it is too small to be biopsied.  This patient may have stage IV disease.   · She initiated concurrent radiation with continuous 5-FU on 8/12/2019.  · Patient finished concurrent chemoradiation therapy.  · Patient underwent surgical resection of the rectal tumor and diverting loop ileostomy on 12/2/2019 with Dr. Ye.  Pathology evaluation reported residual T3 disease, with 1 out of 14 lymph nodes positive for malignancy.  Certainly this patient has at least stage IIIb rectal cancer (T3 N1 M0?)  · On 1/22/2020 adjuvant chemotherapy FOLFOX 6 regimen initiated.   · On 2/5/2022 cycle #2 was delayed secondary to neutropenia.  She was given 3 days of Granix.   · Emend added with cycle 3.  With improved nausea control  · Continuing home Granix x3 days following 5-FU disconnect  · 3/11/2020 due for cycle 4, however, she is experiencing progressive abdominal pain and occasional fevers.   · CT scan performed on 3/13/2020 reveals no evidence of progressive or recurrent disease.  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  · Patient hospitalized 4/24-4/26/20 after cycle 5 of chemotherapy with acute UTI.  CT abdomen/pelvis noting fluid collection in the presacral region having diminished in size compared to CT dated 3/13/2020.  Patient was evaluated by Dr. Ye while in the hospital with further plans to  evaluate possible colovaginal fistula following completion of chemotherapy.  Patient did respond to IV antibiotics and discharged home on oral cefdinir.  · 4/29/2020 cycle 6 of FOLFOX.  Urinary symptoms have resolved   · Patient seen in the [facility] ED on 5/2/2020 for what was suspected to be an allergic reaction.  She called our service on Saturday explaining that she was experiencing hand and face swelling.  She was instructed to proceed to the emergency department at which time she was assessed and prescribed a Medrol Dosepak.  She had just completed her 5-FU and was unhooked on Friday, 5/1/2020.  Her symptoms have resolved in the office on assessment, 5/5/2020.  · The patient had grade 3 hand-foot syndrome based on symptomology.  Patient had cycle #8 of 5-FU on 5/13/2020. Due to her symptoms and poor tolerance to the 5-FU, her treatment dose will be reduced 50% for oxaliplatin and infusional 5-FU.  Bolus 5-FU will be discontinued..  · On 5/21/2020 patient had a PET scan and it showed no evidence of of metastatic disease in the neck, chest, abdomen or pelvis.  There was fluid accumulation/abscess.   · On 5/27/2020 discussed with the patient that we can discontinue chemotherapy at this time.  She will follow-up with Dr. Ye to discuss a possibility to reverse the ileostomy.  We likely will obtain anther PET scan in 3 to 4 months for reassessment.    · Patient was seen by Dr. Ye on 6/19/2019 for evaluation and to discuss possible take down of her ileostomy.  She is scheduled to have a gastrografin enema on 7/2/2020 to evaluate for a fistula, and then a colonoscopy on 7/15/2020, both done by Dr. Ye.  She states that based on the above imaging and procedure findings, she may have a more extensive surgery done or just have her ileostomy reversed, which would likely be done in August 2020.  This will all be coordinated under the care of Dr. Ye.     · CT scan from 09/09/2020 and also compared to her  previous PET scan examination from 05/21/2020 and also the original PET scan from 08/09/2019.  The original PET scan there is a small right upper lobe pulmonary nodule 6 mm with SUV 5.6. So in the 05/2020 PET scan the nodule is still there but activity seems much less and this most recent CT scan examination also documented the preexisting small pulmonary nodule however there is a new left lower lobe pulmonary nodule 8 mm in size and I shared with the patient today this nodule is suspicious for metastatic disease. Laboratory studies reported minimal CEA level 1.32 on 09/09/2020. Liver function panel was only marginally abnormal with the ALT 45, the remaining is normal. However laboratory study today showed worsening AST 55, ALT 95 and alkaline phosphatase 143 but is still normal, total bilirubin 0.3.   · Recommended to have repeat PET scan examination for assessment because the left lower lobe pulmonary nodule is too small to be biopsied, and if the PET scan reports hypermetabolic lesion, I recommended to have stereotactic radiation therapy. Explained to patient that she is not a really good candidate to have thoracotomy for resection because she still has unhealed wound in the abdomen. Stereotactic radiation therapy will likely be a more feasible choice.   · PET scan examination obtained on 9/18/2020 showed metastatic disease.  It reported a few hypermetabolic pulmonary nodules, and some new small pulmonary nodules, all of them highly suspicious for metastatic disease.  She also has hypermetabolic activity in the abdomen and pelvic area which could be related to scar tissue from her recent surgery versus metastatic disease.  Nevertheless overall picture fits with metastatic cancer.  · Discussed with the patient on 9/20/2020, we reviewed the PET scan images together, and I recommended to have systematic chemotherapy, I do not think stereotactic reading therapy to the hypermetabolic lesions in the lungs warranted at  this time because even those will be treated with reading therapy, she will still need systematic chemotherapy.  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  I did discuss with the patient using anti-EGFR monoclonal antibody versus anti-VEGF monoclonal antibody.     · Palliative chemotherapy cycle#1 FOLFIRI was started on 10/13/2020.  No bolus 5-FU given considering previous poor tolerance.    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  Microsatellite stable, mutation burden 5/Mb.   · Discussed with the patient 10/27/2020, because of the K-[place] mutation, she is not a candidate for anti-EGFR monoclonal antibody such as Erbitux or vectibix.  She could be a candidate for anti-VEGF monoclonal antibody, however because of active wound, she is not a candidate right now at this moment.  · Patient seen in the ED for acute right neck pain on 11/16/2020.  CT angiogram as well as CT of the cervical spine performed with no acute findings but notably one of her pulmonary nodules, left upper lobe, somewhat decreased in size.  Patient given prednisone pack.  Further details below.  · Cycle #6 chemotherapy will be resumed on 1/5/2021.  Started back on original irinotecan.  · PET scan examination obtained on 1/12/2021 after cycle #6 chemotherapy reported improved pulmonary nodule hypermetabolic activity.  Confirmed these are metastatic lesions.  · Patient is evaluated 1/19/2020, will continue cycle #7 FOLFIRI chemotherapy.  However Avastin will be on hold see next section.   · Patient was reevaluated on 2/2/2021, will continue chemotherapy cycle #8 FOLFIRI.  Avastin / biosimilar will be added.    · She talked to [facility] specialist in mid February 2021, and had recommended palliative chemotherapy without surgical intervention of metastatic lung lesions.   · On 3/2/2021, patient will proceed with cycle #10 FOLFIRI plus bevacizumab.  After  12 cycles, plan to start maintenance treatment.  · 3/16/2021 cycle 11.  Plus bevacizumab.  · 3/30/2021 cycle 12 FOLFIRI plus bevacizumab.  Having issues with worsening nausea despite premeds with dexamethasone, Aloxi, Emend, and Zofran at home.  Patient is requesting a dose of Phenergan, which is helped her in the past.  We will give this to her with treatment today.  · PET scan examination on 4/6/2021 reported no evidence of hypermetabolic metastatic lesion.  · Discussed with the patient on 4/13/2021, we reviewed the PET scan results.  We recommended to switch to maintenance chemotherapy without irinotecan.  We will continue 5-FU and Avastin every 2 weeks.  We discussed possibility of switching to oral Xeloda treatment.  Discussed with the patient for side effects more so with hand-foot syndrome.  Patient is agreeable.   · Xeloda 2000 mg twice daily 7 days on, 7 days off along with Avastin initiated 4/27/2021.  · After only 5 doses of Xeloda significant hand-foot syndrome, Xeloda held. Patient requesting to be transitioned back to infusional 5-FU, as she felt that she tolerated infusional 5-FU without any problem.  The patient will receive 5-FU leucovorin and Avastin every 2 weeks.  Xeloda discontinued.  · 5/25/2021 continued cycle #4 maintenance 5-FU, leucovorin, Avastin tolerating this much better so far, we will continue this. Recommended to have 12 cycles of maintenance chemotherapy, then obtain PET scan for reevaluation.  We could discuss further treatment plan at that time.  · 9/14/2021 patient due for cycle 12 of additional maintenance 5-FU/leucovorin plus Avastin.  She continues to tolerate treatment well overall.  She is anxious in the office today with her Mediport not getting blood at first and also with upcoming scans being heavy on her mind.  She was instructed to take one of her Klonopin pills while here to help calm her mind.  Heart rate did improve from 140s down to 112.  Proceed with treatment  today as scheduled.  · PET scan examination on 9/24/2021 reported further shrinking of the right upper lobe tiny pulmonary nodule.  No other new lesions.  · Discussed with patient on 9/24/2021 about further shrinking of the right upper lobe pulmonary nodule and no evidence for other new lesions. We recommended to have chemo break for 3 months with repeated CT scan for reevaluation.  · Recent CT scan for chest 12/14/2021 and abdomen CT from 11/29/2021 reported no evidence for active disease. The right upper lobe pulmonary nodule, left lower lobe pulmonary nodule minimal about 4 mm and stable. I discussed with patient today on 12/21/2021, recommended to give her another 3 months chemotherapy holiday and obtain CT scan afterwards for reevaluation. After discussion, patient is agreeable.   · CT scan examination for chest abdomen and pelvis obtained on 3/15/2022 reported a slight increase of the left upper lobe pulmonary nodule 5 mm, from previous 3 mm.  The other pulmonary nodules were stable in size.  There is also small left upper lobe groundglass changes.   · I reviewed images studies with the patient today on 3/23/2022, compared to multiple previous images including CT chest CT and PET scan, suspected disease progression.  Discussed with the patient, and I recommended to resume maintenance chemotherapy with 5-FU plus leucovorin plus Avastin repeating every 2 weeks, and obtaining CT scan for reassessment in 3 months.  Patient is agreeable.  · Patient resumed maintenance chemotherapy on 3/29/2022 with 5-FU leucovorin and Avastin.   · 4/26/2022, proceed with cycle #27 maintenance 5-FU, leucovorin, and Avastin.  Patient continues to tolerate treatment well.    · On 5/10/2022, we will proceed ahead with cycle #28 maintenance chemotherapy.  Plan to have CT scan after cycle #30.  · 5/24/2020 to cycle 29 maintenance chemotherapy.  Continue to tolerate well.      2.   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  Low-dose Lovenox discontinued and initiated Xarelto 20 mg daily.  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  · Patient held Xarelto for 2 days prior to MediPort removal from the left chest wall and placement of new port in the right chest wall on 12/18/2020.  She resumed Xarelto that evening.  · Progressive swelling in the neck, face, upper extremities prompting hospitalization with CT scan showing thrombosis in the right subclavian vein and SVC.  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  Although she had been off of Xarelto for a few days, clearly Xarelto was not prohibiting progression of her thrombosis after she resumed treatment and there was some evidence to suggest thrombosis had been present at least in the innominate vein on prior scan in September but now appears chronic.  Current acute thrombosis involving SVC and right subclavian.  · Patient was admitted and placed on heparin drip  · Patient was evaluated by vascular surgery and intervention was not recommended for thrombolysis/thrombectomy.  · On 12/22/2020, the patient developed worsening shortness of breath, increasing upper extremity edema consistent with worsening SVC syndrome.  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  · Symptoms improved and patient was discharged  12/24/2020 on Lovenox 100 mg every 12 hours.    · Returns 12/29/2020 for evaluation continuing Lovenox, overall tolerating it well.  No bleeding issues.  · Improved edema 1/5/2021.  Will continue Lovenox weight-based every 12 hours.  · On 1/19/2021 and 2/2/2021, patient reports improved facial swelling.  She however does have being bruise on her abdomen wall where she does Lovenox injection.  However she does not have a spontaneous epistaxis, gum bleeding or other bleeding.   · On 2/2/2021, we discussed that side effects of Avastin/biosimilar related to thrombosis.  Since this patient already has been on Lovenox, I think the benefits from treatment for her cancer will outweigh that risk of thrombosis, will proceed ahead with Avastin.  I cautioned patient to watch out for signs of worsening thrombosis patient voiced understanding.   · On 3/16/2021, no evidence of worsening DVT, continue Lovenox.  · 5/11/2021 Lovenox dosing will be adjusted due to patient's weight loss.  We discussed she needs 1 mg/kg.  Her syringes are 100 mg syringes she will do 0.9 mL subcu every 12 hours.  · 8/3/2021 Patient continues to have bruising on abdomen from lovenox injections.  Will need to monitor weight and adjust dosing in future if further weight loss.   · Stable condition on 9/28/2021.  Continue Lovenox anticoagulation.    · Patient reports no chest pain no dyspnea no leg edema. However she had bruising and also most recently abnormal small abscess for which she actually went to ER on 11/29/2021, had I&D. The wound is healing. No further drainage. Denies fever sweating or chills. After discussion, she will continue Lovenox injection for now.   · On 3/23/2022, patient reports good tolerance of Lovenox.  Nevertheless she still has small quantity of pus in the left lower abdomen abscess, she squeezes it every day to prevent it became worse.  She reports no fever sweating chills.  Recommended to start Augmentin 875 mg twice a day for 7  days.  She will continue Lovenox indefinitely.  · On 4/12/2022, patient reports resolution of the small abscess at left lower abdominal wall.   · On 5/10/2022, patient continues on Lovenox indefinitely.  No easy bleeding or bruising.  · 5/24/2022 continuing on Lovenox 1 mg/kg at a dose of 100 mg (patient weight is 99.7 kg today).  Every 12 hours.    3.  This patient also has strong family history for malignancy the patient had appointment with genetic counseling on September 3, 2019.  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    4.  Mild anemia, improved since her surgery.    · She also has a history of iron deficiency.  Iron studies reveal iron saturation of 10%.  She was started on oral iron but was unable to tolerate due to GI side effects.   · Status post Injectafer 2/5/2020 and 2/12/2020   · Improved hemoglobin 13.5 on 1/5/2021.  · 3/16/2020 when hemoglobin now up to 16.2, hematocrit 47.6.  Patient admits that she has started smoking again, and I have encouraged her to quit.  She denies any snoring or sleep apnea diagnosis.  Patient is not taking oral iron.  We will closely monitor.  · 3/30/2020 hemoglobin 15.7, HCT 47.5.  Patient states that she has cut back significantly on her smoking, although not quit yet.  I have encouraged her to continue working on this.  We will continue to closely monitor.  · Hemoglobin 14.6 on 6/8/2021 at the meantime he has worsening macrocytosis .6.    · Laboratory studies 6/22/2021 reported excellent folate > 20 ng/mL, however low normal B12 level 357 pg/mL.    · On 7/6/2021, normal hemoglobin 15.8, .9 and HCT 47.2%.  Patient was asked to start oral vitamin B12 at 1000 mcg daily.   · 9/14/2021 hemoglobin 17.0, hematocrit 52.1.  Monitor.  · On 9/28/2021 hemoglobin 16.1 hematocrit 48.5%, continue to monitor.   · Lab study on 12/14/2021 reported excellent ferritin 296 and iron saturation 25% with free iron 104 TIBC 424. Hemoglobin was 15.2 with .2.   · Normal  hemoglobin 14.6 on 3/15/2022.  Iron study reported normal ferritin 129.5 ng/mL however slightly low iron saturation 11%.  Continue to monitor for now.  · 4/26/2022, hemoglobin today 14.8.  · 5/24/2022 hemoglobin 15.3.    5.  Peripheral neuropathy secondary to chemotherapy.   · This is mild involving bilateral hands and feet as reported on 9/16/2020.   · Will avoid chemotherapy with oxaliplatin.  · Stable mild neuropathy as of 11/10/2020  · Patient reports worsening peripheral neuropathy and cycle #5 chemotherapy on 12/8/2020 with and without irinotecan.   · On 1/5/2021, patient reports improvement of peripheral neuropathy, will add irinotecan back to chemotherapy.  Continue to monitor and adjust medication.  · On 3/2/2021, patient reports no worsening of peripheral neuropathy after restarting irinotecan.   · This remains stable, may be slightly better as reported on 3/23/2022..   · No worsening since resuming chemotherapy on 3/29/2022.  · On 5/10/2022 peripheral neuropathy remains stable today.    6.  History of hand-foot syndrome grade 3.    · It become so significant after cycle #7 FOLFOX treatment.  Discussed with patient on 5/13/2020, will discontinue the bolus 5-FU dose, and also decrease 50% of the infusion dose through the pump.   · We also use Medrol Dosepak for possible recurrent symptomology.  She responded this well.   · Her hand-foot syndrome improved.  · Under current treatment with FOLFIRI, patient not receiving 5-FU bolus.  No recurrent issues.   · Patient will be switched to maintenance chemotherapy using Xeloda in late April 2021.  · Following 5 doses of Xeloda, significant hand-foot syndrome with pain in the feet, significant erythema.  Xeloda held.  Will be further discussed with Dr. Nguyen to determine if the patient will resume 5-FU versus a dose reduction to Xeloda.  · Aggressive emollient moisturizer use twice daily for the past week and patient reports improvement in the dryness and erythema.   Xeloda will now be discontinued and patient will switch back to 5-FU.  · As of 5/25/2021 dryness and erythema/hyperpigmentation continues to improve.  Xeloda has been discontinued.  · 6/8/2021, patient reports much improved hand-foot syndrome, with remnant pigmentation on palms.  · On 7/6/2021, patient reports and had a some flareup of hand-foot syndrome, however improved after aggressive moisturizing cream and the urea cream.  Overall much tolerated infusional 5-FU compared to Xeloda.    · 7/20/2021 continues to have mild hyperpigmentation of her hands and feet bilaterally, she continues aggressive moisturization with utter balm with urea.  She also reports some soreness of her tailbone, and we discussed that this is likely due to loss of weight and muscle mass.  I advised her to go ahead and apply moisturizer to this area as there is one tiny fissure.  Also recommended using a waffle pad or doughnut to sit on.  · 8/3/2021 patient reports her hand foot symptoms are stable and without worsening.  Continue to monitor.  · Symptoms stable, typically flaring about 24 hours after she is unhooked from her 5-FU infusional ball and then settling down with some mild peeling.   · Since off chemotherapy no specific complaints.   · No recurrent symptoms after resuming chemotherapy on 3/29/2022.  · 5/24/2022 does report some mild symptoms about 24 hours after disconnect.  She is using utterly smooth twice a day.    7.  Hyperlacrimation and mild scleral irritation related to 5-FU.  She has been taking steroid eyedrops.  This has improved her hyperlacrimation.  · Not currently an issue.     8.  Abnormal liver function panel.  · Overall LFTs have improved which is mild ongoing elevation of AST and ALT.  Continue to monitor.    · Slightly worse AST 43 ALT 84 on 9/28/2021.  Continue to monitor.  · Slightly elevated AST 34 ALT 55 on 12/21/2021. Continue to monitor.   · Normal liver function panel on 3/15/2022 and on 4/12/2022. 0.70.    · Normal liver function panel today on 5/10/2022 except ALT 35.    9.  Depression.  Patient seen by JULIET Davenport on 11/9/2020.  She was started on mirtazapine.  Lexapro was discontinued.  This has definitely improved her mood with the patient feeling overall much better.  She is sleeping better.  Appetite is improved with her actually eating more than she wants to.    · Condition is stable.  She plans to continue follow-up with supportive oncology.      10.  Proteinuria: 3/30/2020: Patient is 2+ protein in her urine.  We will continue with Avastin and closely monitor.  No need for 24-hour urine at this time.  · Persistent 2+ proteinuria on 4/13/2021.  continue to monitor.  · 5/25/2021 protein urine only 1+.    · 6/22/2021 persistent 1+ proteinuria.  Continue to monitor.  · 7/6/2021, trace protein.  Continue Avastin treatment and monitoring.  · 8/3/2021 1+ protein, stable from last cycle.   · On 8/17/2021, urine protein 2+.  She also had a 1+ leukocytes.  Patient is not symptomatic such as fever, dysuria or gross hematuria, however urine culture obtained, with >100,000 E. Faecalis. Patient treated with Levaquin.   · No active problem currently.  Trace amount urine protein today.    11.  Tachycardia:   · Patient was seen by Dr. Bustos cardiologist on 4/6/2021.   · On 9/14/2021 patient more tachycardic in the setting of anxiety.  Improved with Klonopin.  · 4/26/2022 heart rate 88.  · On 5/10/2022 heart rate 99.    12.  Hypertension.  · /91 at visit 8/17/2021.  She was given clonidine for treatment that day and also prescribed lisinopril 10 mg daily.    · Patient unable to tolerate lisinopril, noting that her blood pressure bottomed out with systolic of barely 80 and feeling very fatigued and wiped out.  She has been checking her blood pressure off the lisinopril with systolics in the 120s to 130s at home and diastolic in the 80s to 90s.   · She remains off antihypertensives.  Blood pressure today  130/88.    PLAN:   1. Proceed with cycle 29 maintenance 5-FU, leucovorin, Avastin, continued every 2 weeks.  2. Continue Lovenox at 1 mg/kg twice daily.  3. Continue oral B12 1000 mcg daily.  4. In 2 weeks return for follow-up visit with APRN prior to cycle 30 maintenance chemotherapy.  5. In 3 weeks patient will have CT scan of the chest, abdomen, and pelvis with IV contrast to evaluate treatment response.  6. In 4 weeks follow-up with Dr. Nguyen for scan review, repeat labs, and further treatment, pending scan results.  7. Call/return sooner should she develop any new concerns or problems.        Patient continues on high risk medication requiring close monitoring for toxicity.    Sheba Matias, JULIET  05/24/22      CC:  Deepika Vela III NP-C   MD Perla Bowers MD

## 2022-05-26 ENCOUNTER — INFUSION (OUTPATIENT)
Dept: ONCOLOGY | Facility: HOSPITAL | Age: 42
End: 2022-05-26

## 2022-05-26 DIAGNOSIS — C20 ADENOCARCINOMA OF RECTUM: Primary | ICD-10-CM

## 2022-05-26 DIAGNOSIS — Z45.2 ENCOUNTER FOR FITTING AND ADJUSTMENT OF VASCULAR CATHETER: ICD-10-CM

## 2022-05-26 PROCEDURE — 25010000002 HEPARIN LOCK FLUSH PER 10 UNITS: Performed by: INTERNAL MEDICINE

## 2022-05-26 RX ORDER — SODIUM CHLORIDE 0.9 % (FLUSH) 0.9 %
10 SYRINGE (ML) INJECTION AS NEEDED
Status: CANCELLED | OUTPATIENT
Start: 2022-05-26

## 2022-05-26 RX ORDER — HEPARIN SODIUM (PORCINE) LOCK FLUSH IV SOLN 100 UNIT/ML 100 UNIT/ML
500 SOLUTION INTRAVENOUS AS NEEDED
Status: CANCELLED | OUTPATIENT
Start: 2022-05-26

## 2022-05-26 RX ORDER — HEPARIN SODIUM (PORCINE) LOCK FLUSH IV SOLN 100 UNIT/ML 100 UNIT/ML
500 SOLUTION INTRAVENOUS AS NEEDED
Status: DISCONTINUED | OUTPATIENT
Start: 2022-05-26 | End: 2022-05-26 | Stop reason: HOSPADM

## 2022-05-26 RX ADMIN — Medication 500 UNITS: at 14:22

## 2022-06-03 NOTE — PROGRESS NOTES
REASON FOR FOLLOW UP:     1. Rectal cancer, rectal ultrasound examination in July 2019 reported T3N0 disease without lymphadenopathy. She does have small pulmonary nodule 6-7 mm and 2 mm with indeterminate feature. There is no solid evidence of metastatic disease otherwise. Patient has stage IIA (T3N0M0) disease.  2. The patient is heterozygous factor V Leiden, was on prophylactic anticoagulation with Lovenox 40 mg daily given her increased risk of thrombosis with MediPort and GI malignancy.   3. PET scan on 8/8/2019 reported an 8 mm hypermetabolic right upper lobe pulmonary nodule, which is suspicious for metastatic as well.    4. Patient had a PowerPort placement on the left upper chest by Dr. Joseph on 8/9/2019.  5. Patient was started on concurrent infusional 5-FU chemoradiation therapy on 8/12/2019, with planned complete surgical resection and further adjuvant chemotherapy with intention to cure the disease.   6. Patient underwent surgical resection of the primary rectal cancer by Dr. Ye on 12/2/2019.  Pathology evaluation reported residual T3N1 disease stage IIIb.  7. Diarrhea related to therapy and radiation.   8. Patient was started cycle 1 day 1 of adjuvant FOLFOX 6 on 1/23/2020.  9. On 2/5/2020 FOLFOX 6 cycle 2 delayed secondary to neutropenia.  Patient was given 3 days of Granix injection.  After cycle #2 we planned 3 days of Granix with each cycle.   10. Patient also intolerant of oral iron.  Patient received 2 doses of IV injectafer on 02/05/2020 and 02/12/2020.   11. 02/12/2020 Proceed with cycle #2 of FOLFOX 6 with 3 days of Granix.  12. On 3/11/2020 cycle 4 postponed secondary to abdominal pain and occasional low-grade fevers.  CT scans ordered.  13. Cycle #4 resumed after CT scan revealed no evidence of disease.  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 Ye. patient has no fever.  Continue to observe.   14. Grade 3  hand-foot syndrome secondary to 5-FU after cycle #7 FOLFOX 6 chemotherapy, prompting ER visit.  Also has worsening peripheral neuropathy.   15. Cycle #8 FOLFOX 6 was given on 5/13/2020, with 50% dose reduction for both 5-FU and oxaliplatin, and also examination of bolus 5-FU.   16. PET scan examination on 5/21/2020 reported no evidence of metastatic disease in the chest abdomen pelvis.  Stable 6 mm RUL pulmonary nodule.  17. On 5/27/2020, I discussed with the patient that we can discontinue chemotherapy at this time.   18. Patient had a surgical procedure for low anterior colon resection, coloanal anastomosis on 8/3/2020.  19. CT scan for chest abdomen pelvis on 9/9/2020 reported a new 8 mm noncalcified pulmonary nodule in the left lower lobe of the lung.  Otherwise stable right upper lobe 6 mm pulmonary nodule, and no evidence of disease recurrence in the abdomen or pelvis.  20. PET scan examination on 9/18/2020 reported multiple hypermetabolic small pulmonary nodules/ new pulmonary nodules.   21. Patient was started on pelvic chemotherapy with FOLFIRI regimen on 10/13/2020.   22. 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 It reported tumor mutation burden 5 Muts/Mb, microsatellite stable, TP53 mutation R282W, and others.   23. Cycle #5 was without irinotecan, due to peripheral neuropathy.  24. 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  Patient was switched to weight-based Lovenox injection.  25. Cycle #6 5-FU and irinotecan was delayed by 2 weeks because of the above incident.  26. Patient had a telemedicine evaluation at that the [facility] [place] in February 2021.  They agreed with our treatment plan for palliative chemotherapy followed by maintenance chemotherapy.    27. PET scan examination on 4/6/2021 after cycle #12 palliative chemotherapy reported no  evidence of hypermetabolic metastatic lesion.   28. Patient was started on maintenance chemotherapy with 5-FU and Avastin on 4/13/2021. (Unable to tolerate Xeloda because of a significant hand-foot syndrome).   29. PET scan on 9/24/2021 obtained after cycle #12 maintenance chemotherapy with 5-FU, leucovorin, Avastin reported no evidence for active disease. We recommended 3 months chemotherapy holiday.  30. CT scan examination on 3/15/2022 reported slightly disease progression with increase in size of small pulmonary nodule.  We started patient back on maintenance chemotherapy on 3/29/2022 treatment with 5-FU plus leucovorin and Avastin, repeating every 2 weeks.      HISTORY OF PRESENT ILLNESS:  The patient is a 41 y.o. female with the above-mentioned issue who is here today for lab review and evaluation for continued treatment with 5-FU, leucovorin, Avastin.  She has noticed some spots on her skin, present for about two weeks.  They do not itch, burn, or bother her in any way.  There are slightly darkened areas, except for one area on her right elbow that is slightly raised and bumpy.    Otherwise, she is doing well.  She continues to have issues with worse hand/foot syndrome symptoms for a few days following treatments.  She continues Lovenox injections and denies any significant bleeding issues.  Her ostomy continues to function without difficulty.  No other new problems or concerns.      Past Medical History:   Diagnosis Date   • Abdominopelvic abscess (HCC) 08/12/2020    ADMITTED TO [facility]   • Abnormal Pap smear of cervix 02/02/1998    JULIUS I   • Anemia in pregnancy    • Anemia in pregnancy    • Anxiety    • Bilateral epiphora 04/2020   • Bilateral hand swelling 05/02/2020    SEEN AT [facility] ER   • Cervical lymphadenitis 06/06/2012    SEEN AT [facility] ER   • Chemotherapy induced diarrhea 01/2021   • Chemotherapy induced neutropenia (HCC) 04/2020   • Chemotherapy-induced nausea 02/2020   • Chemotherapy-induced  thrombocytopenia 2020   • Chronic diarrhea    • Colon polyps     FOLLOWED BY DR. GERONIMO ESPARZA   • Cystitis 2020    WITH DEHYDRATION, ADMITTED TO [facility]   • Cystitis 10/27/2020   • Depression    • Drug-induced peripheral neuropathy ([facility]) 2020   • Factor V Leiden mutation ([facility])    • Fistula of intestine    • GERD (gastroesophageal reflux disease)    • Hand foot syndrome 2020   • Heart murmur     IN CHILDHOOD   • Hematochezia    • Hemorrhoids    • Heterozygous factor V Leiden mutation ([facility])     DX 2019   • History of anemia    • History of chemotherapy     FOLLOWED BY DR. ALEXANDRU HUNT   • History of gestational diabetes    • History of pre-eclampsia    • History of radiation therapy     FOLLOWED BY DR. JAVON LEWIS   • History of TB skin testing 2009    TB Skin Test   • HPV in female    • Hypokalemia 2019   • Hypomagnesemia 2019   • Hyponatremia 2021   • IBS (irritable bowel syndrome)    • Ileostomy in place ([facility])     FOLLOWED BY DR. GERONIMO ESPARZA   • Infected insect bite of neck 2016    SEEN AT [facility]   • Kidney stones 2007    SEEN AT [facility] ER   • Lump of right breast     SWOLLEN LYMPH NODE   • Lung cancer ([facility]) 2020    METASTATIC LUNG CANCER   • Macrocytosis 2021   • Monopolar depression ([facility])    • On anticoagulant therapy    • Pain associated with surgical procedure 2020   • Palmar-plantar erythrodysesthesia 2021   • Perirectal abscess 2020   • Pulmonary embolism without acute cor pulmonale ([facility]) 09/20/2019    X 3; ADMITTED TO [facility]   • Pulmonary nodule, right 2020   • Rectal cancer ([facility]) 2019    STAGE IIA, INVASIVE MODERATELY DIFFERENTIATED ADENOCARCINOMA, CHEMO AND XRT FINISHED 2019   • Right shoulder pain 2020    SEEN AT [facility] ER   • Right ureteral stone 10/01/2019    SEEN AT [facility] ER   • SAB (spontaneous ) 1996     A2-1 INDUCED   • Sciatica    • Sepsis due to cellulitis (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 JAMEY TALAVERA AT [facility]   • COLON RESECTION N/A 12/2/2019    Procedure: laparoscopic low anterior colon resection with mobilization of splenic flexure and diverting loop ileostomy: ERAS;  Surgeon: Padmaja Esparza MD;  Location: [facility];  Service: General   • COLON RESECTION N/A 8/3/2020    Procedure: LOW ANTERIOR COLON RESECTION, COLOANAL ANASTOMOSIS, MOBILIZATION SPLENIC FLEXURE;  Surgeon: Padmaja Esparza MD;  Location: [facility];  Service: General;  Laterality: N/A;   • COLONOSCOPY N/A 7/15/2020    PATENT ANASTAMOSIS IN MID RECTUM, RESCOPE IN 1 YR, DR. PADMAJA ESPARZA AT [facility]   • COLONOSCOPY W/ POLYPECTOMY N/A 07/08/2019    15 MM TUBULOVILLOUS ADENOMA POLYP IN HEPATIC FLEXURE, 20 MMTUBULOVILLOUS ADENOMA WITH HIGH GRADE DYSPLASIA IN RECTOSIGMOID, 4 CM MASS IN MID RECTUM, PATH: INVASIVE MODERATELY DIFFERENTIATED ADENOCARCINOMA, DR. JENNIFER LI AT [facility]   • DILATATION AND EVACUATION N/A 2009   • ENDOSCOPY N/A 07/08/2019    LA GRADE A ESOPHAGITIS, GASTRITIS, ALL BIOPSIES BENIGN, DR. JENNIFER LI AT [facility]   • INCISION AND DRAINAGE PERIRECTAL ABSCESS N/A 8/14/2020    Procedure: INCISION AND DRAINAGE OF retrorectal dehiscence abcess with drain placement and irrigation;  Surgeon: Padmaja Esparza MD;  Location: [facility];  Service: General;  Laterality: N/A;   • PAP SMEAR N/A 01/24/2014   • SIGMOIDOSCOPY N/A 7/24/2019    INFILTRATIVE PARTIALLY OBSTRUCTING LARGE RECTAL CANCER, AREA TATOOED, DR. PADMAJA ESPARZA AT [facility]   • SIGMOIDOSCOPY N/A 11/23/2019    AN ULCERATED PARTIALLY OBSTRUCTING MASS IN MID RECTUM, AREA TATTOOED, DR. PADMAJA ESPARZA AT [facility]   •  SIGMOIDOSCOPY N/A 7/22/2021    PATENT ANASTAMOSIS IN DISTAL RECTUM, RESCOPE IN 1 YR, DR. GERONIMO YE AT [facility]   • TRANSRECTAL ULTRASOUND N/A 7/24/2019    Procedure: ULTRASOUND TRANSRECTAL;  Surgeon: Geronimo Ye MD;  Location: [facility] ENDOSCOPY;  Service: General   • URETEROSCOPY LASER LITHOTRIPSY WITH STENT INSERTION Right 10/30/2019    DR. ESTUARDO BEASLEY AT [place]   • VAGINAL DELIVERY N/A 09/18/1998   • VENOUS ACCESS DEVICE (PORT) INSERTION Left 8/9/2019    Procedure: INSERTION VENOUS ACCESS DEVICE;  Surgeon: Sj Joseph MD;  Location:  [facility];  Service: General   • VENOUS ACCESS DEVICE (PORT) INSERTION N/A 12/18/2020    Procedure: INSERTION VENOUS ACCESS DEVICE right side, removal venous access device left side;  Surgeon: Sj Joseph MD;  Location: [facility] OR [facility];  Service: General;  Laterality: N/A;   • WISDOM TOOTH EXTRACTION Bilateral 1993       HEMATOLOGIC/ONCOLOGIC HISTORY:      The patient reports she has intermittent rectal bleeding and urgency, mucous with her stool, starting sometime in 2016. At that time she was referred to GI service, and was diagnosed of irritable bowel syndrome. Nevertheless she had worsening urgency for bowel movement, and had a couple of incidents losing control of stool. She was recently seen by Roland Thorpe MD again and had colonoscopy and EGD exam on 07/08/2019. She was found having a circumferential rectal mass. According to the procedure note, the patient had a fungating circumferential bleeding 4 cm mass in the middle rectum, at distance between 13 cm and 17 cm from the anus. Mass was causing partial obstruction. There were also colon polyps found at the hepatic flexure and also at the rectosigmoid junction 23 cm from the anus. Both were resected and retrieved. EGD examination reported grade A esophagitis at the GE junction and patchy discontinuous erythema and aggravation of the mucosa without bleeding in the stomach body. There is normal mucosa of the  duodenum. Pathology evaluation from this procedure reported moderately differentiated adenocarcinoma involving the rectal mass. The rectal sigmoid junction polyp was tubular/tubulovillous adenoma with high grade dysplasia negative for invasive malignancy. The hepatic flexure polyp was also tubular/tubulovillous adenoma negative for high grade dysplasia or malignancy. The biopsy from the esophagus reports squamocolumnar mucosa with inflammatory changes suggestive of mild reflux esophagitis, negative for interstitial metaplasia. Gastric biopsy was benign and duodenal biopsy was also benign. There is no mention of H-pylori.     Patient was subsequently referred to colorectal surgeon Padmaja Ye MD for further evaluation. The patient had CT scan examination for chest, abdomen and pelvis requested by Dr. Ye and were done on 07/13/2019. The study reported no evidence of lymphadenopathy in the abdomen and pelvis. There was wall thickening of the rectosigmoid junction. Normal spleen, pancreas, adrenal glands and kidneys. There was fatty liver infiltration but no focal lymphatic lesions. There was a small 6-7 mm noncalcified nodule in the right upper lobe and a tiny 3 mm subpleural nodule in the right middle lobe. No mediastinal or hilar lymphadenopathy.     Dr. Ye performed staging rectal ultrasound examination. This study reported tumor penetrating through the muscularis propria as T3 disease; there were no lymph nodes identified.    She had a hospitalization in late September 2019 because of newly discovered pulmonary emboli.  She was on prophylactic Lovenox prior to the incident of PE.  Now she is on full dose Xarelto anticoagulation.  Patient reports no further chest pain dyspnea.  She denies bleeding or bruising.  During her hospitalization, she was seen by Dr. Ye, who plans to have surgery 8 to 12 weeks after finishing radiation therapy.  She finished her radiation on 9/19/2019.     I noticed patient also  presented to the emergency room on 10/1/2019 complaining of right flank area pain.  I reviewed the images studies and indeed she has a very small 1 to 2 mm obstructing kidney stone in the UV junction.  Patient is still symptomatic with some pains and dysuria.  She denies fever sweating or chills.    Laboratory study on 10/7/2019 reported normal CBC including hemoglobin 13.1, platelets 301,000, WBC 6170 and ANC 4900 lymphocytes 590 monocytes 480.      The nurse reported malfunction of port-a-catheter on 10/7/2019.  Port study on 10/8/2019 showed fibrin sheath around the distal aspect of the Mediport catheter in the SVC. This does not appear to hinder injection, but does prevent aspiration at this time.    Patient underwent surgical resection of the colon on 12/2/2019 with Dr. Ye.  Pathology evaluation reported residual T3 disease, also 1 out of 14 lymph nodes positive for malignancy.  So this patient in either had at least stage IIIb disease (T3 N1 M0?).      Adjuvant chemotherapy FOLFOX 6 will be started on 1/22/2020.  Laboratory study reported iron saturation 10%, free iron 31 TIBC 319 and ferritin 168.  Her hemoglobin was 11.8, WBC 5600, and platelets 347,000.    Patient was here on 02/12/2020 for cycle 2 of her FOLFOX.  This is delayed x1 week secondary to neutropenia.  The patient ultimately received 3 days worth of Neupogen with recovery of her blood counts.  Of note, the patient struggled with significant nausea without any episodes of vomiting following cycle #1 of chemotherapy resulting in significant weight loss.  She is up 12 pounds over the last week as her appetite has normalized.  We will add Emend to her care plan.    She is having several loose stools per her colostomy and has been hesitant to take Imodium due to prior history of constipation.  I encouraged her to try this with a maximum of 8 tablets/day.  She denies any infectious symptoms including fevers or chills.  No excess bleeding or  bruising noted.  She had the expected cold sensitivity related to the Oxaliplatin for about 3 days following treatment.    Labs from 02/12/2020 demonstrated total white blood cell count of 5.14, ANC of 3.06, hemoglobin of 11.2, platelets of 211,000.  She was found to be iron deficient last week and is intolerant to oral iron secondary to GI distress.  For this reason, she initiated IV iron therapy with Injectafer last week.  She had received her second dose 02/12/2020    Patient has normalized hemoglobin 12.2 on 2/26/2020.    She reported on 5/5/2020 she had a recent visit to the emergency department for what was suspected to be an allergic reaction.  She called our on-call service on Saturday with reports of hand and face swelling.  She was instructed to proceed to the emergency department at which time she was assessed and prescribed a Medrol Dosepak.  She had just completed her 5-FU and was unhooked on Friday, 5/3/2020.  Her symptoms have improved.  She does report persistent hyperpigmentation and mild swelling of the palms of the hands but this is much improved.  It was her right hand specifically that was swollen.  Her facial swelling has resolved.  She continues on Cefdinir nd since has 1 day remaining, she was prescribed cefdinir for a UTI requiring hospitalization at the end of April.  Reports no new symptoms.  Her labs are stable.  She is scheduled for treatment again.    Patient states at this time she is not tolerating her chemotherapy well.     Patient seen in the emergency department on 5/2/2020 for what was suspected to be an allergic reaction.  She called our on-call service on Saturday explaining that she was experiencing hand and face swelling.  She was instructed to proceed to the emergency department at which time she was assessed and prescribed a Medrol Dosepak.  She had just completed her 5-FU and was unhooked on Friday, 5/1/2020.      She reports since her ED visit on 5/2/2020 her symptoms have  not improved. Her hands and feet were swollen upon presenting to the ED and she could barely make a fist. She states that she feels so swollen she is not able to stand it. She states that her skin on the hands and feet are peeling extensively as well, besides changing color to more dark.     She also reports significant fatigue after her first week of the 5-FU treatment but she expected this side effect. She also notices significant increase in her neuropathy to the point that she is not able to even walk around in her home without her house slippers due to irritation from her rugs. She denies and associated nausea or vomiting at this time. She also has episodes of epistaxis every day after the chemotherapy cycle #7.  She does report working full-time during the week of chemotherapy.    Laboratory studies, 5/13/2020, show moderate thrombocytopenia platelets 123,000, low normal WBC 4140 including ANC 2720 and normal hemoglobin 13.4.  Because significant hand-foot syndrome, will decrease both 5-FU and oxaliplatin by 50%, and eliminate bolus dose of 5-FU.    On 5/21/2020 patient had a PET scan performed which showed no convincing evidence of residual disease in abdomen or pelvis, no metastatic disease within the chest or neck.     Cycle #8 FOLFOX 6 was given on 5/13/2020, with 50% dose reduction for both 5-FU and oxaliplatin, and also examination of bolus 5-FU.  She states on 5/27/2020 that with the recent reduction of the chemotherapy she feels significantly better. She has more energy and is able to do her daily routine.      PET scan examination on 5/21/2020 reported no evidence of metastatic disease in chest abdomen pelvis.  There was a stable 6 mm right upper lobe pulmonary nodule.    Laboratory studies on 5/27/2020 showed mild leukocytopenia WBC 3720 but a normal ANC 2250 and lymphocytes 630.  Normal platelets 163,000 and hemoglobin 12.6.  Chemistry lab reported normal renal function, liver function panel and a  electrolytes, elevated glucose 164.    Laboratory studies 6/24/2020, showed normal hemoglobin 13.4 but macrocytosis .9.  Normal platelets 210,000 and WBC 5870.  She had normal CMP.     Patient last time was here in late June 2020.  Since that time she had surgical procedure for low anterior colon resection, coloanal anastomosis on 8/3/2020.  She later developed a perirectal abscess, required surgical drainage on 8/14/2020.  She was discharged on 8/27/2020.    Patient reports to me she still has lower abdominal wall vacuum suction in place.  She denies fever sweating chills.  Performance status is ECOG 1.  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  She does have visiting nurse come to the home for wound care.    CT scan for chest abdomen pelvis on 9/9/2020 reported a new 8 mm noncalcified pulmonary nodule in the left lower lobe.  Otherwise stable right upper lobe 6 mm pulmonary nodule, and no evidence of disease recurrence in the abdomen or pelvis.     Laboratory study on 9/16/2020 reported elevated AST 55, ALT 95, alk phosphatase 143 but normal total bilirubin 0.3.  Chemistry lab otherwise unremarkable.  She has completed normal CBC.      Because of the abnormal CT scan examination for chest abdomen pelvis on 9/9/2020 reported a new 8 mm noncalcified pulmonary nodule in the left lower lobe, we obtained a PET scan examination for further evaluation, which was done on 9/18/2020.  This study reported several pulmonary nodules, some of them are hypermetabolic, newly developed compared to previous PET scan in May 2020.  Certainly does highly suspicious for metastatic disease.  There are also hypermetabolic activity in the abdomen and pelvic area which is hard to differentiate from surgical scar versus metastatic malignancy.    Laboratory study on 10/13/2020 reported normal CBC with Hb 13.9, platelets 302,000 and WBC 5520 including ANC 3830.  Chemistry lab reported normalized AST 20,  alk phosphatase 116 improved ALT 35, and maintains normal bilirubin, with normal electrolytes and liver function panel.     Patient was started on first cycle of palliative chemotherapy FOLFIRI on 10/13/2020.    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  Patient had a new port placed 12/18/2020, and had held her Xarelto for 2 days prior to surgery.  She presented to the ER on 12/21/20 with complaints of facial and arm swelling for 3 days.  She also noted increased shortness of breath.  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  Thrombus within the right internal jugular vein cannot be excluded.  Patient was started on IV heparin, and eventually transitioned to weight-based Lovenox, which she now continues.    PET scan examination on 9/24/2021 reported further shrinking of the tiny right upper lobe pulmonary nodule.  Otherwise no new lesions.  No evidence for metastatic disease in other areas.      Patient had CT scan for chest with IV contrast obtained on 12/14/2021 which reported small tiny stable pulmonary nodules. There is a 4 mm right upper lobe pulmonary nodule. There is also a stable 4 mm left lower lobe pulmonary nodule. There is also stable left upper lobe micronodule.     Laboratory studies today on 12/21/2021 reported normal hemoglobin 15.9 however .0, platelets 218,000 WBC 5360 including neutrophils 3730 lymphocytes 980. Chemistry lab reported normal renal function, electrolytes, glucose, and marginally elevated ALT 55, AST 34 but normal total bilirubin and alk phosphatase.       MEDICATIONS    Current Outpatient Medications:   •  clonazePAM (KlonoPIN) 1 MG tablet, TAKE 1 TABLET BY MOUTH 3 TIMES A DAY AS NEEDED FOR ANXIETY OR SEIZURES, Disp: 90 tablet, Rfl: 1  •  Cyanocobalamin (VITAMIN B-12 PO), Take  by  mouth., Disp: , Rfl:   •  dicyclomine (BENTYL) 20 MG tablet, TAKE 1 TABLET BY MOUTH EVERY 6 HOURS AS NEEDED, Disp: 360 tablet, Rfl: 0  •  diphenoxylate-atropine (LOMOTIL) 2.5-0.025 MG per tablet, Take 1 tablet by mouth 4 (Four) Times a Day As Needed for Diarrhea., Disp: 120 tablet, Rfl: 1  •  Enoxaparin Sodium (LOVENOX) 100 MG/ML solution prefilled syringe syringe, Inject 1 mL under the skin into the appropriate area as directed Every 12 (Twelve) Hours., Disp: 60 mL, Rfl: 2  •  escitalopram (LEXAPRO) 20 MG tablet, TAKE 1 TABLET BY MOUTH EVERY DAY, Disp: 90 tablet, Rfl: 3  •  famotidine (PEPCID) 20 MG tablet, TAKE 1 TABLET BY MOUTH TWICE A DAY, Disp: 180 tablet, Rfl: 1  •  fluorouracil in dextrose 5 % 250 mL infusion, Infuse  into a venous catheter 1 (One) Time. Takes twice a month., Disp: , Rfl:   •  Loratadine 10 MG capsule, Take 10 mg by mouth Every Evening., Disp: , Rfl:   •  metoprolol succinate XL (TOPROL-XL) 25 MG 24 hr tablet, Take 0.5 tablets by mouth Every Night., Disp: 45 tablet, Rfl: 3  •  mineral oil-hydrophilic petrolatum (AQUAPHOR) ointment, Apply 1 application topically to the appropriate area as directed As Needed for Dry Skin., Disp: , Rfl:   •  ondansetron (ZOFRAN) 8 MG tablet, Take 1 tablet by mouth 3 (Three) Times a Day As Needed for Nausea or Vomiting., Disp: 60 tablet, Rfl: 1  •  promethazine (PHENERGAN) 25 MG tablet, Take 1 tablet by mouth Every 6 (Six) Hours As Needed for Nausea or Vomiting., Disp: 60 tablet, Rfl: 2  No current facility-administered medications for this visit.    Facility-Administered Medications Ordered in Other Visits:   •  fluorouracil (ADRUCIL) 4,870 mg, sodium chloride 0.9 % 142.6 mL 240 mL chemo infusion - FOR HOME USE, 2,400 mg/m2 (Treatment Plan Recorded), Intravenous, Once, Sheba Matias APRN, 4,870 mg at 06/07/22 1127    ALLERGIES:   No Known Allergies    SOCIAL HISTORY:       Social History     Tobacco Use   • Smoking status: Current Every Day Smoker      "Packs/day: 1.00     Years: 24.00     Pack years: 24.00     Types: Electronic Cigarette, Cigarettes   • Smokeless tobacco: Never Used   • Tobacco comment: 1 PPD   Vaping Use   • Vaping Use: Some days   • Substances: Nicotine   • Devices: Disposable   Substance Use Topics   • Alcohol use: Not Currently     Comment: Caffeine use rare   • Drug use: No         FAMILY HISTORY:   Mother has positive factor V Leiden mutation, although she did not have thrombosis, mother also is coronary disease with stenting, she also is occasional bruising.  Maternal grandmother had DVT, she had multiple surgical procedures.  Patient mother's half-brother had metastatic colon cancer at diagnosis in his 50s.  Maternal great aunt has breast cancer.  Patient will follow his skin cancer in his 60s, exclusive.         Vitals:    06/07/22 0859   BP: 128/82   Pulse: 97   Resp: 16   Temp: 97.3 °F (36.3 °C)   TempSrc: Temporal   SpO2: 95%   Weight: 98.1 kg (216 lb 3.2 oz)   Height: 165.1 cm (65\")   PainSc: 0-No pain     Current Status 6/7/2022   ECOG score 0     Physical Exam  Vitals reviewed.   Constitutional:       General: She is not in acute distress.     Appearance: Normal appearance. She is well-developed.   HENT:      Head: Normocephalic and atraumatic.      Comments: Patient wears mask due to the pandemic coronavirus infection.     Eyes:      General: No scleral icterus.     Conjunctiva/sclera: Conjunctivae normal.   Cardiovascular:      Rate and Rhythm: Normal rate and regular rhythm.      Heart sounds: Normal heart sounds. No murmur heard.  Pulmonary:      Effort: Pulmonary effort is normal. No respiratory distress.      Breath sounds: Normal breath sounds.   Abdominal:      General: Bowel sounds are normal.      Palpations: Abdomen is soft.      Tenderness: There is no abdominal tenderness.      Comments: Ostomy in right abdomen. Scattered bruises on the abdomen bilaterally from Lovenox injections.   Musculoskeletal:         General: No " swelling.      Cervical back: Normal range of motion.   Skin:     General: Skin is warm and dry.      Findings: Bruising present.      Comments: Left lower abdomen wall abscess has resolved.  No signs of infection locally.  Hyperpigmentation of the nailbeds with mild dryness of the skin.  Areas of hyperpigmentation inside of her left wrist, right outer ankle.  Small area on the right elbow maculopapular rash   Neurological:      Mental Status: She is alert and oriented to person, place, and time. Mental status is at baseline.   Psychiatric:         Mood and Affect: Mood normal.         Thought Content: Thought content normal.       06/07/2022 physical exam is unchanged from above except as updated.    RECENT LABS:      Results from last 7 days   Lab Units 06/07/22  0843   WBC 10*3/mm3 5.61   NEUTROS ABS 10*3/mm3 3.45   HEMOGLOBIN g/dL 15.5   HEMATOCRIT % 48.0*   PLATELETS 10*3/mm3 148       Results from last 7 days   Lab Units 06/07/22  0843   SODIUM mmol/L 139   POTASSIUM mmol/L 4.8*   CHLORIDE mmol/L 105   CO2 mmol/L 20.7*   BUN mg/dL 10   CREATININE mg/dL 0.88   CALCIUM mg/dL 9.3   ALBUMIN g/dL 3.80   BILIRUBIN mg/dL 0.2   ALK PHOS U/L 87   ALT (SGPT) U/L 47*   AST (SGOT) U/L 31   GLUCOSE mg/dL 98          Lab Results   Component Value Date    GLUCOSE 98 06/07/2022    BUN 10 06/07/2022    CREATININE 0.88 06/07/2022    EGFRIFNONA 79 12/21/2021    BCR 11.4 06/07/2022    K 4.8 (H) 06/07/2022    CO2 20.7 (L) 06/07/2022    CALCIUM 9.3 06/07/2022    ALBUMIN 3.80 06/07/2022    AST 31 06/07/2022    ALT 47 (H) 06/07/2022         IMAGING:      ASSESSMENT:   1.  Metastatic rectal cancer.   · Initial rectal ultrasound examination reported T3N0 disease without lymphadenopathy.   · CT scan of chest, abdomen and pelvis reported no lymphadenopathy in the abdomen, pelvis or chest. She does have small pulmonary nodule 6-7 mm and 2 mm with indeterminate feature. There is no solid evidence of metastatic disease otherwise.   · Based  on the CT scan and rectal ultrasound imaging studies, this patient had stage IIA (T3N0M0) disease.   · She had PET scan examination on 8/8/2019 which reported a hypermetabolic right upper lobe pulmonary nodule 6 mm with SUV 5.6.  This is suspicious for metastatic disease however it is too small to be biopsied.  This patient may have stage IV disease.   · She initiated concurrent radiation with continuous 5-FU on 8/12/2019.  · Patient finished concurrent chemoradiation therapy.  · Patient underwent surgical resection of the rectal tumor and diverting loop ileostomy on 12/2/2019 with Dr. Ye.  Pathology evaluation reported residual T3 disease, with 1 out of 14 lymph nodes positive for malignancy.  Certainly this patient has at least stage IIIb rectal cancer (T3 N1 M0?)  · On 1/22/2020 adjuvant chemotherapy FOLFOX 6 regimen initiated.   · On 2/5/2022 cycle #2 was delayed secondary to neutropenia.  She was given 3 days of Granix.   · Emend added with cycle 3.  With improved nausea control  · Continuing home Granix x3 days following 5-FU disconnect  · 3/11/2020 due for cycle 4, however, she is experiencing progressive abdominal pain and occasional fevers.   · CT scan performed on 3/13/2020 reveals no evidence of progressive or recurrent disease.  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  · Patient hospitalized 4/24-4/26/20 after cycle 5 of chemotherapy with acute UTI.  CT abdomen/pelvis noting fluid collection in the presacral region having diminished in size compared to CT dated 3/13/2020.  Patient was evaluated by Dr. Ye while in the hospital with further plans to evaluate possible colovaginal fistula following completion of chemotherapy.  Patient did respond to IV antibiotics and discharged home on oral cefdinir.  · 4/29/2020 cycle 6 of FOLFOX.  Urinary symptoms have resolved   · Patient seen in the [facility] on 5/2/2020 for what was suspected to be an allergic reaction.  She called our service on Saturday  explaining that she was experiencing hand and face swelling.  She was instructed to proceed to the emergency department at which time she was assessed and prescribed a Medrol Dosepak.  She had just completed her 5-FU and was unhooked on Friday, 5/1/2020.  Her symptoms have resolved in the office on assessment, 5/5/2020.  · The patient had grade 3 hand-foot syndrome based on symptomology.  Patient had cycle #8 of 5-FU on 5/13/2020. Due to her symptoms and poor tolerance to the 5-FU, her treatment dose will be reduced 50% for oxaliplatin and infusional 5-FU.  Bolus 5-FU will be discontinued..  · On 5/21/2020 patient had a PET scan and it showed no evidence of of metastatic disease in the neck, chest, abdomen or pelvis.  There was fluid accumulation/abscess.   · On 5/27/2020 discussed with the patient that we can discontinue chemotherapy at this time.  She will follow-up with Dr. Ye to discuss a possibility to reverse the ileostomy.  We likely will obtain anther PET scan in 3 to 4 months for reassessment.    · Patient was seen by Dr. Ye on 6/19/2019 for evaluation and to discuss possible take down of her ileostomy.  She is scheduled to have a gastrografin enema on 7/2/2020 to evaluate for a fistula, and then a colonoscopy on 7/15/2020, both done by Dr. Ye.  She states that based on the above imaging and procedure findings, she may have a more extensive surgery done or just have her ileostomy reversed, which would likely be done in August 2020.  This will all be coordinated under the care of Dr. Ye.     · CT scan from 09/09/2020 and also compared to her previous PET scan examination from 05/21/2020 and also the original PET scan from 08/09/2019.  The original PET scan there is a small right upper lobe pulmonary nodule 6 mm with SUV 5.6. So in the 05/2020 PET scan the nodule is still there but activity seems much less and this most recent CT scan examination also documented the preexisting small pulmonary  nodule however there is a new left lower lobe pulmonary nodule 8 mm in size and I shared with the patient today this nodule is suspicious for metastatic disease. Laboratory studies reported minimal CEA level 1.32 on 09/09/2020. Liver function panel was only marginally abnormal with the ALT 45, the remaining is normal. However laboratory study today showed worsening AST 55, ALT 95 and alkaline phosphatase 143 but is still normal, total bilirubin 0.3.   · Recommended to have repeat PET scan examination for assessment because the left lower lobe pulmonary nodule is too small to be biopsied, and if the PET scan reports hypermetabolic lesion, I recommended to have stereotactic radiation therapy. Explained to patient that she is not a really good candidate to have thoracotomy for resection because she still has unhealed wound in the abdomen. Stereotactic radiation therapy will likely be a more feasible choice.   · PET scan examination obtained on 9/18/2020 showed metastatic disease.  It reported a few hypermetabolic pulmonary nodules, and some new small pulmonary nodules, all of them highly suspicious for metastatic disease.  She also has hypermetabolic activity in the abdomen and pelvic area which could be related to scar tissue from her recent surgery versus metastatic disease.  Nevertheless overall picture fits with metastatic cancer.  · Discussed with the patient on 9/20/2020, we reviewed the PET scan images together, and I recommended to have systematic chemotherapy, I do not think stereotactic reading therapy to the hypermetabolic lesions in the lungs warranted at this time because even those will be treated with reading therapy, she will still need systematic chemotherapy.  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  I did discuss with the patient using anti-EGFR monoclonal antibody versus anti-VEGF monoclonal antibody.     · Palliative chemotherapy cycle#1 FOLFIRI was  started on 10/13/2020.  No bolus 5-FU given considering previous poor tolerance.    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  Microsatellite stable, mutation burden 5/Mb.   · Discussed with the patient 10/27/2020, because of the [place] mutation, she is not a candidate for anti-EGFR monoclonal antibody such as Erbitux or vectibix.  She could be a candidate for anti-VEGF monoclonal antibody, however because of active wound, she is not a candidate right now at this moment.  · Patient seen in the ED for acute right neck pain on 11/16/2020.  CT angiogram as well as CT of the cervical spine performed with no acute findings but notably one of her pulmonary nodules, left upper lobe, somewhat decreased in size.  Patient given prednisone pack.  Further details below.  · Cycle #6 chemotherapy will be resumed on 1/5/2021.  Started back on original irinotecan.  · PET scan examination obtained on 1/12/2021 after cycle #6 chemotherapy reported improved pulmonary nodule hypermetabolic activity.  Confirmed these are metastatic lesions.  · Patient is evaluated 1/19/2020, will continue cycle #7 FOLFIRI chemotherapy.  However Avastin will be on hold see next section.   · Patient was reevaluated on 2/2/2021, will continue chemotherapy cycle #8 FOLFIRI.  Avastin / biosimilar will be added.    · She talked to [facility] specialist in mid February 2021, and had recommended palliative chemotherapy without surgical intervention of metastatic lung lesions.   · On 3/2/2021, patient will proceed with cycle #10 FOLFIRI plus bevacizumab.  After 12 cycles, plan to start maintenance treatment.  · 3/16/2021 cycle 11.  Plus bevacizumab.  · 3/30/2021 cycle 12 FOLFIRI plus bevacizumab.  Having issues with worsening nausea despite premeds with dexamethasone, Aloxi, Emend, and Zofran at home.  Patient is requesting a dose of Phenergan, which is helped her in the past.  We will give this to her with  treatment today.  · PET scan examination on 4/6/2021 reported no evidence of hypermetabolic metastatic lesion.  · Discussed with the patient on 4/13/2021, we reviewed the PET scan results.  We recommended to switch to maintenance chemotherapy without irinotecan.  We will continue 5-FU and Avastin every 2 weeks.  We discussed possibility of switching to oral Xeloda treatment.  Discussed with the patient for side effects more so with hand-foot syndrome.  Patient is agreeable.   · Xeloda 2000 mg twice daily 7 days on, 7 days off along with Avastin initiated 4/27/2021.  · After only 5 doses of Xeloda significant hand-foot syndrome, Xeloda held. Patient requesting to be transitioned back to infusional 5-FU, as she felt that she tolerated infusional 5-FU without any problem.  The patient will receive 5-FU leucovorin and Avastin every 2 weeks.  Xeloda discontinued.  · 5/25/2021 continued cycle #4 maintenance 5-FU, leucovorin, Avastin tolerating this much better so far, we will continue this. Recommended to have 12 cycles of maintenance chemotherapy, then obtain PET scan for reevaluation.  We could discuss further treatment plan at that time.  · 9/14/2021 patient due for cycle 12 of additional maintenance 5-FU/leucovorin plus Avastin.  She continues to tolerate treatment well overall.  She is anxious in the office today with her Mediport not getting blood at first and also with upcoming scans being heavy on her mind.  She was instructed to take one of her Klonopin pills while here to help calm her mind.  Heart rate did improve from 140s down to 112.  Proceed with treatment today as scheduled.  · PET scan examination on 9/24/2021 reported further shrinking of the right upper lobe tiny pulmonary nodule.  No other new lesions.  · Discussed with patient on 9/24/2021 about further shrinking of the right upper lobe pulmonary nodule and no evidence for other new lesions. We recommended to have chemo break for 3 months with  repeated CT scan for reevaluation.  · Recent CT scan for chest 12/14/2021 and abdomen CT from 11/29/2021 reported no evidence for active disease. The right upper lobe pulmonary nodule, left lower lobe pulmonary nodule minimal about 4 mm and stable. I discussed with patient today on 12/21/2021, recommended to give her another 3 months chemotherapy holiday and obtain CT scan afterwards for reevaluation. After discussion, patient is agreeable.   · CT scan examination for chest abdomen and pelvis obtained on 3/15/2022 reported a slight increase of the left upper lobe pulmonary nodule 5 mm, from previous 3 mm.  The other pulmonary nodules were stable in size.  There is also small left upper lobe groundglass changes.   · I reviewed images studies with the patient today on 3/23/2022, compared to multiple previous images including CT chest CT and PET scan, suspected disease progression.  Discussed with the patient, and I recommended to resume maintenance chemotherapy with 5-FU plus leucovorin plus Avastin repeating every 2 weeks, and obtaining CT scan for reassessment in 3 months.  Patient is agreeable.  · Patient resumed maintenance chemotherapy on 3/29/2022 with 5-FU leucovorin and Avastin.   · 4/26/2022, proceed with cycle #27 maintenance 5-FU, leucovorin, and Avastin.  Patient continues to tolerate treatment well.    · On 5/10/2022, we will proceed ahead with cycle #28 maintenance chemotherapy.  Plan to have CT scan after cycle #30.  · 5/24/2022 cycle 29 maintenance chemotherapy.  Continue to tolerate well.  · 6/7/2022 cycle 30 maintenance chemotherapy, continuing to tolerate well.  Patient will have scans after this treatment to determine the need for further maintenance chemotherapy.  She has a skin rash that has popped up but is otherwise not bothersome to her.  We discussed making sure does moisturize and continue to monitor.  If changes or worsens notify the office.      2.   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  Low-dose Lovenox discontinued and initiated Xarelto 20 mg daily.  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  · Patient held Xarelto for 2 days prior to MediPort removal from the left chest wall and placement of new port in the right chest wall on 12/18/2020.  She resumed Xarelto that evening.  · Progressive swelling in the neck, face, upper extremities prompting hospitalization with CT scan showing thrombosis in the right subclavian vein and SVC.  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  Although she had been off of Xarelto for a few days, clearly Xarelto was not prohibiting progression of her thrombosis after she resumed treatment and there was some evidence to suggest thrombosis had been present at least in the innominate vein on prior scan in September but now appears chronic.  Current acute thrombosis involving SVC and right subclavian.  · Patient was admitted and placed on heparin drip  · Patient was evaluated by vascular surgery and intervention was not recommended for thrombolysis/thrombectomy.  · On 12/22/2020, the patient developed worsening shortness of breath, increasing upper extremity edema consistent with worsening SVC syndrome.  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  · Symptoms improved and  patient was discharged 12/24/2020 on Lovenox 100 mg every 12 hours.    · Returns 12/29/2020 for evaluation continuing Lovenox, overall tolerating it well.  No bleeding issues.  · Improved edema 1/5/2021.  Will continue Lovenox weight-based every 12 hours.  · On 1/19/2021 and 2/2/2021, patient reports improved facial swelling.  She however does have being bruise on her abdomen wall where she does Lovenox injection.  However she does not have a spontaneous epistaxis, gum bleeding or other bleeding.   · On 2/2/2021, we discussed that side effects of Avastin/biosimilar related to thrombosis.  Since this patient already has been on Lovenox, I think the benefits from treatment for her cancer will outweigh that risk of thrombosis, will proceed ahead with Avastin.  I cautioned patient to watch out for signs of worsening thrombosis patient voiced understanding.   · On 3/16/2021, no evidence of worsening DVT, continue Lovenox.  · 5/11/2021 Lovenox dosing will be adjusted due to patient's weight loss.  We discussed she needs 1 mg/kg.  Her syringes are 100 mg syringes she will do 0.9 mL subcu every 12 hours.  · 8/3/2021 Patient continues to have bruising on abdomen from lovenox injections.  Will need to monitor weight and adjust dosing in future if further weight loss.   · Stable condition on 9/28/2021.  Continue Lovenox anticoagulation.    · Patient reports no chest pain no dyspnea no leg edema. However she had bruising and also most recently abnormal small abscess for which she actually went to ER on 11/29/2021, had I&D. The wound is healing. No further drainage. Denies fever sweating or chills. After discussion, she will continue Lovenox injection for now.   · On 3/23/2022, patient reports good tolerance of Lovenox.  Nevertheless she still has small quantity of pus in the left lower abdomen abscess, she squeezes it every day to prevent it became worse.  She reports no fever sweating chills.  Recommended to start Augmentin  875 mg twice a day for 7 days.  She will continue Lovenox indefinitely.  · On 4/12/2022, patient reports resolution of the small abscess at left lower abdominal wall.   · On 5/10/2022, patient continues on Lovenox indefinitely.  No easy bleeding or bruising.  · 5/24/2022 continuing on Lovenox 1 mg/kg at a dose of 100 mg (patient weight is 99.7 kg today).  Every 12 hours.    3.  This patient also has strong family history for malignancy the patient had appointment with genetic counseling on September 3, 2019.  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    4.  Mild anemia, improved since her surgery.    · She also has a history of iron deficiency.  Iron studies reveal iron saturation of 10%.  She was started on oral iron but was unable to tolerate due to GI side effects.   · Status post Injectafer 2/5/2020 and 2/12/2020   · Improved hemoglobin 13.5 on 1/5/2021.  · 3/16/2020 when hemoglobin now up to 16.2, hematocrit 47.6.  Patient admits that she has started smoking again, and I have encouraged her to quit.  She denies any snoring or sleep apnea diagnosis.  Patient is not taking oral iron.  We will closely monitor.  · 3/30/2020 hemoglobin 15.7, HCT 47.5.  Patient states that she has cut back significantly on her smoking, although not quit yet.  I have encouraged her to continue working on this.  We will continue to closely monitor.  · Hemoglobin 14.6 on 6/8/2021 at the meantime he has worsening macrocytosis .6.    · Laboratory studies 6/22/2021 reported excellent folate > 20 ng/mL, however low normal B12 level 357 pg/mL.    · On 7/6/2021, normal hemoglobin 15.8, .9 and HCT 47.2%.  Patient was asked to start oral vitamin B12 at 1000 mcg daily.   · 9/14/2021 hemoglobin 17.0, hematocrit 52.1.  Monitor.  · On 9/28/2021 hemoglobin 16.1 hematocrit 48.5%, continue to monitor.   · Lab study on 12/14/2021 reported excellent ferritin 296 and iron saturation 25% with free iron 104 TIBC 424. Hemoglobin was 15.2  with .2.   · Normal hemoglobin 14.6 on 3/15/2022.  Iron study reported normal ferritin 129.5 ng/mL however slightly low iron saturation 11%.  Continue to monitor for now.  · 4/26/2022, hemoglobin today 14.8.  · 6/7/2022 hemoglobin 15.5.    5.  Peripheral neuropathy secondary to chemotherapy.   · This is mild involving bilateral hands and feet as reported on 9/16/2020.   · Will avoid chemotherapy with oxaliplatin.  · Stable mild neuropathy as of 11/10/2020  · Patient reports worsening peripheral neuropathy and cycle #5 chemotherapy on 12/8/2020 with and without irinotecan.   · On 1/5/2021, patient reports improvement of peripheral neuropathy, will add irinotecan back to chemotherapy.  Continue to monitor and adjust medication.  · On 3/2/2021, patient reports no worsening of peripheral neuropathy after restarting irinotecan.   · This remains stable, may be slightly better as reported on 3/23/2022..   · No worsening since resuming chemotherapy on 3/29/2022.  · On 5/10/2022 peripheral neuropathy remains stable today.    6.  History of hand-foot syndrome grade 3.    · It become so significant after cycle #7 FOLFOX treatment.  Discussed with patient on 5/13/2020, will discontinue the bolus 5-FU dose, and also decrease 50% of the infusion dose through the pump.   · We also use Medrol Dosepak for possible recurrent symptomology.  She responded this well.   · Her hand-foot syndrome improved.  · Under current treatment with FOLFIRI, patient not receiving 5-FU bolus.  No recurrent issues.   · Patient will be switched to maintenance chemotherapy using Xeloda in late April 2021.  · Following 5 doses of Xeloda, significant hand-foot syndrome with pain in the feet, significant erythema.  Xeloda held.  Will be further discussed with Dr. Nguyen to determine if the patient will resume 5-FU versus a dose reduction to Xeloda.  · Aggressive emollient moisturizer use twice daily for the past week and patient reports improvement in  the dryness and erythema.  Xeloda will now be discontinued and patient will switch back to 5-FU.  · As of 5/25/2021 dryness and erythema/hyperpigmentation continues to improve.  Xeloda has been discontinued.  · 6/8/2021, patient reports much improved hand-foot syndrome, with remnant pigmentation on palms.  · On 7/6/2021, patient reports and had a some flareup of hand-foot syndrome, however improved after aggressive moisturizing cream and the urea cream.  Overall much tolerated infusional 5-FU compared to Xeloda.    · 7/20/2021 continues to have mild hyperpigmentation of her hands and feet bilaterally, she continues aggressive moisturization with utter balm with urea.  She also reports some soreness of her tailbone, and we discussed that this is likely due to loss of weight and muscle mass.  I advised her to go ahead and apply moisturizer to this area as there is one tiny fissure.  Also recommended using a waffle pad or doughnut to sit on.  · 8/3/2021 patient reports her hand foot symptoms are stable and without worsening.  Continue to monitor.  · Symptoms stable, typically flaring about 24 hours after she is unhooked from her 5-FU infusional ball and then settling down with some mild peeling.   · Since off chemotherapy no specific complaints.   · No recurrent symptoms after resuming chemotherapy on 3/29/2022.  · 5/24/2022 does report some mild symptoms about 24 hours after disconnect.  She is using utterly smooth twice a day.    7.  Hyperlacrimation and mild scleral irritation related to 5-FU.  She has been taking steroid eyedrops.  This has improved her hyperlacrimation.  · Not currently an issue.     8.  Abnormal liver function panel.  · Overall LFTs have improved which is mild ongoing elevation of AST and ALT.  Continue to monitor.    · Slightly worse AST 43 ALT 84 on 9/28/2021.  Continue to monitor.  · Slightly elevated AST 34 ALT 55 on 12/21/2021. Continue to monitor.   · Normal liver function panel on  3/15/2022 and on 4/12/2022. 0.70.   · Normal liver function panel today on 5/10/2022 except ALT 35.  · 6/7/2022 ALT 47    9.  Depression.  Patient seen by JULIET Davenport on 11/9/2020.  She was started on mirtazapine.  Lexapro was discontinued.  This has definitely improved her mood with the patient feeling overall much better.  She is sleeping better.  Appetite is improved with her actually eating more than she wants to.    · Condition is stable.  She plans to continue follow-up with supportive oncology.      10.  Proteinuria: 3/30/2020: Patient is 2+ protein in her urine.  We will continue with Avastin and closely monitor.  No need for 24-hour urine at this time.  · Persistent 2+ proteinuria on 4/13/2021.  continue to monitor.  · 5/25/2021 protein urine only 1+.    · 6/22/2021 persistent 1+ proteinuria.  Continue to monitor.  · 7/6/2021, trace protein.  Continue Avastin treatment and monitoring.  · 8/3/2021 1+ protein, stable from last cycle.   · On 8/17/2021, urine protein 2+.  She also had a 1+ leukocytes.  Patient is not symptomatic such as fever, dysuria or gross hematuria, however urine culture obtained, with >100,000 E. Faecalis. Patient treated with Levaquin.   · 6/7/2022 urine negative for protein    11.  Tachycardia:   · Patient was seen by Dr. Bustos cardiologist on 4/6/2021.   · On 9/14/2021 patient more tachycardic in the setting of anxiety.  Improved with Klonopin.  · 4/26/2022 heart rate 88.  · On 5/10/2022 heart rate 99.    12.  Hypertension.  · /91 at visit 8/17/2021.  She was given clonidine for treatment that day and also prescribed lisinopril 10 mg daily.    · Patient unable to tolerate lisinopril, noting that her blood pressure bottomed out with systolic of barely 80 and feeling very fatigued and wiped out.  She has been checking her blood pressure off the lisinopril with systolics in the 120s to 130s at home and diastolic in the 80s to 90s.   · She remains off antihypertensives.   Blood pressure today 130/88.    PLAN:   1. Proceed with cycle 30 maintenance 5-FU, leucovorin, Avastin, continued every 2 weeks.  2. Continue Lovenox at 1 mg/kg twice daily.  3. Continue oral B12 1000 mcg daily.  4. In 1 weeks patient will have CT scan of the chest, abdomen, and pelvis with IV contrast to evaluate treatment response.  5. In 6/17/2022  follow-up with Dr. Nguyen for scan review, repeat labs, and further treatment, pending scan results.  6. Call/return sooner should she develop any new concerns or problems.        Patient continues on high risk medication requiring close monitoring for toxicity.    Sheba Matias, APRN  06/07/22      CC:  Deepika Vela III NP-MD Perla Rahman MD

## 2022-06-07 ENCOUNTER — INFUSION (OUTPATIENT)
Dept: ONCOLOGY | Facility: HOSPITAL | Age: 42
End: 2022-06-07

## 2022-06-07 ENCOUNTER — OFFICE VISIT (OUTPATIENT)
Dept: ONCOLOGY | Facility: CLINIC | Age: 42
End: 2022-06-07

## 2022-06-07 VITALS
DIASTOLIC BLOOD PRESSURE: 82 MMHG | BODY MASS INDEX: 36.02 KG/M2 | HEIGHT: 65 IN | WEIGHT: 216.2 LBS | TEMPERATURE: 97.3 F | RESPIRATION RATE: 16 BRPM | HEART RATE: 97 BPM | OXYGEN SATURATION: 95 % | SYSTOLIC BLOOD PRESSURE: 128 MMHG

## 2022-06-07 DIAGNOSIS — C20 ADENOCARCINOMA OF RECTUM: ICD-10-CM

## 2022-06-07 DIAGNOSIS — C20 ADENOCARCINOMA OF RECTUM: Primary | Chronic | ICD-10-CM

## 2022-06-07 DIAGNOSIS — C20 ADENOCARCINOMA OF RECTUM: Primary | ICD-10-CM

## 2022-06-07 DIAGNOSIS — Z79.01 CHRONIC ANTICOAGULATION: Chronic | ICD-10-CM

## 2022-06-07 DIAGNOSIS — D68.51 HETEROZYGOUS FACTOR V LEIDEN MUTATION: Chronic | ICD-10-CM

## 2022-06-07 LAB
ALBUMIN SERPL-MCNC: 3.8 G/DL (ref 3.5–5.2)
ALBUMIN/GLOB SERPL: 1.2 G/DL (ref 1.1–2.4)
ALP SERPL-CCNC: 87 U/L (ref 38–116)
ALT SERPL W P-5'-P-CCNC: 47 U/L (ref 0–33)
ANION GAP SERPL CALCULATED.3IONS-SCNC: 13.3 MMOL/L (ref 5–15)
AST SERPL-CCNC: 31 U/L (ref 0–32)
BASOPHILS # BLD AUTO: 0.03 10*3/MM3 (ref 0–0.2)
BASOPHILS NFR BLD AUTO: 0.5 % (ref 0–1.5)
BILIRUB SERPL-MCNC: 0.2 MG/DL (ref 0.2–1.2)
BILIRUB UR QL STRIP: NEGATIVE
BUN SERPL-MCNC: 10 MG/DL (ref 6–20)
BUN/CREAT SERPL: 11.4 (ref 7.3–30)
CALCIUM SPEC-SCNC: 9.3 MG/DL (ref 8.5–10.2)
CEA SERPL-MCNC: 1.88 NG/ML
CHLORIDE SERPL-SCNC: 105 MMOL/L (ref 98–107)
CLARITY UR: CLEAR
CO2 SERPL-SCNC: 20.7 MMOL/L (ref 22–29)
COLOR UR: YELLOW
CREAT SERPL-MCNC: 0.88 MG/DL (ref 0.6–1.1)
DEPRECATED RDW RBC AUTO: 62.9 FL (ref 37–54)
EGFRCR SERPLBLD CKD-EPI 2021: 84.3 ML/MIN/1.73
EOSINOPHIL # BLD AUTO: 0.23 10*3/MM3 (ref 0–0.4)
EOSINOPHIL NFR BLD AUTO: 4.1 % (ref 0.3–6.2)
ERYTHROCYTE [DISTWIDTH] IN BLOOD BY AUTOMATED COUNT: 17.1 % (ref 12.3–15.4)
GLOBULIN UR ELPH-MCNC: 3.2 GM/DL (ref 1.8–3.5)
GLUCOSE SERPL-MCNC: 98 MG/DL (ref 74–124)
GLUCOSE UR STRIP-MCNC: NEGATIVE MG/DL
HCT VFR BLD AUTO: 48 % (ref 34–46.6)
HGB BLD-MCNC: 15.5 G/DL (ref 12–15.9)
HGB UR QL STRIP.AUTO: ABNORMAL
IMM GRANULOCYTES # BLD AUTO: 0.01 10*3/MM3 (ref 0–0.05)
IMM GRANULOCYTES NFR BLD AUTO: 0.2 % (ref 0–0.5)
KETONES UR QL STRIP: NEGATIVE
LEUKOCYTE ESTERASE UR QL STRIP.AUTO: NEGATIVE
LYMPHOCYTES # BLD AUTO: 1.17 10*3/MM3 (ref 0.7–3.1)
LYMPHOCYTES NFR BLD AUTO: 20.9 % (ref 19.6–45.3)
MCH RBC QN AUTO: 33 PG (ref 26.6–33)
MCHC RBC AUTO-ENTMCNC: 32.3 G/DL (ref 31.5–35.7)
MCV RBC AUTO: 102.1 FL (ref 79–97)
MONOCYTES # BLD AUTO: 0.72 10*3/MM3 (ref 0.1–0.9)
MONOCYTES NFR BLD AUTO: 12.8 % (ref 5–12)
NEUTROPHILS NFR BLD AUTO: 3.45 10*3/MM3 (ref 1.7–7)
NEUTROPHILS NFR BLD AUTO: 61.5 % (ref 42.7–76)
NITRITE UR QL STRIP: NEGATIVE
NRBC BLD AUTO-RTO: 0 /100 WBC (ref 0–0.2)
PH UR STRIP.AUTO: 5.5 [PH] (ref 4.5–8)
PLATELET # BLD AUTO: 148 10*3/MM3 (ref 140–450)
PMV BLD AUTO: 8.9 FL (ref 6–12)
POTASSIUM SERPL-SCNC: 4.8 MMOL/L (ref 3.5–4.7)
PROT SERPL-MCNC: 7 G/DL (ref 6.3–8)
PROT UR QL STRIP: NEGATIVE
RBC # BLD AUTO: 4.7 10*6/MM3 (ref 3.77–5.28)
SODIUM SERPL-SCNC: 139 MMOL/L (ref 134–145)
SP GR UR STRIP: 1.02 (ref 1–1.03)
UROBILINOGEN UR QL STRIP: ABNORMAL
WBC NRBC COR # BLD: 5.61 10*3/MM3 (ref 3.4–10.8)

## 2022-06-07 PROCEDURE — 80053 COMPREHEN METABOLIC PANEL: CPT

## 2022-06-07 PROCEDURE — 25010000002 FLUOROURACIL PER 500 MG: Performed by: NURSE PRACTITIONER

## 2022-06-07 PROCEDURE — 96413 CHEMO IV INFUSION 1 HR: CPT

## 2022-06-07 PROCEDURE — 96367 TX/PROPH/DG ADDL SEQ IV INF: CPT

## 2022-06-07 PROCEDURE — 85025 COMPLETE CBC W/AUTO DIFF WBC: CPT

## 2022-06-07 PROCEDURE — 82378 CARCINOEMBRYONIC ANTIGEN: CPT | Performed by: NURSE PRACTITIONER

## 2022-06-07 PROCEDURE — 36415 COLL VENOUS BLD VENIPUNCTURE: CPT | Performed by: NURSE PRACTITIONER

## 2022-06-07 PROCEDURE — 96416 CHEMO PROLONG INFUSE W/PUMP: CPT

## 2022-06-07 PROCEDURE — 25010000002 DEXAMETHASONE PER 1 MG: Performed by: NURSE PRACTITIONER

## 2022-06-07 PROCEDURE — 25010000002 FOSAPREPITANT PER 1 MG: Performed by: NURSE PRACTITIONER

## 2022-06-07 PROCEDURE — 25010000002 BEVACIZUMAB PER 10 MG: Performed by: NURSE PRACTITIONER

## 2022-06-07 PROCEDURE — 99214 OFFICE O/P EST MOD 30 MIN: CPT | Performed by: NURSE PRACTITIONER

## 2022-06-07 PROCEDURE — 25010000002 PALONOSETRON PER 25 MCG: Performed by: NURSE PRACTITIONER

## 2022-06-07 PROCEDURE — 25010000002 LEUCOVORIN 500 MG RECONSTITUTED SOLUTION 1 EACH VIAL: Performed by: NURSE PRACTITIONER

## 2022-06-07 PROCEDURE — 81003 URINALYSIS AUTO W/O SCOPE: CPT

## 2022-06-07 PROCEDURE — 96375 TX/PRO/DX INJ NEW DRUG ADDON: CPT

## 2022-06-07 PROCEDURE — 25010000002 BEVACIZUMAB 100 MG/4ML SOLUTION 4 ML VIAL: Performed by: NURSE PRACTITIONER

## 2022-06-07 RX ORDER — SODIUM CHLORIDE 9 MG/ML
250 INJECTION, SOLUTION INTRAVENOUS ONCE
Status: COMPLETED | OUTPATIENT
Start: 2022-06-07 | End: 2022-06-07

## 2022-06-07 RX ORDER — PALONOSETRON 0.05 MG/ML
0.25 INJECTION, SOLUTION INTRAVENOUS ONCE
Status: COMPLETED | OUTPATIENT
Start: 2022-06-07 | End: 2022-06-07

## 2022-06-07 RX ORDER — SODIUM CHLORIDE 9 MG/ML
250 INJECTION, SOLUTION INTRAVENOUS ONCE
Status: CANCELLED | OUTPATIENT
Start: 2022-06-07

## 2022-06-07 RX ORDER — ONDANSETRON HYDROCHLORIDE 8 MG/1
8 TABLET, FILM COATED ORAL 3 TIMES DAILY PRN
Qty: 60 TABLET | Refills: 1 | Status: SHIPPED | OUTPATIENT
Start: 2022-06-07 | End: 2022-11-10

## 2022-06-07 RX ORDER — PALONOSETRON 0.05 MG/ML
0.25 INJECTION, SOLUTION INTRAVENOUS ONCE
Status: CANCELLED | OUTPATIENT
Start: 2022-06-07

## 2022-06-07 RX ADMIN — FLUOROURACIL 4870 MG: 50 INJECTION, SOLUTION INTRAVENOUS at 11:27

## 2022-06-07 RX ADMIN — BEVACIZUMAB 900 MG: 400 INJECTION, SOLUTION INTRAVENOUS at 10:26

## 2022-06-07 RX ADMIN — SODIUM CHLORIDE 100 ML: 9 INJECTION, SOLUTION INTRAVENOUS at 09:55

## 2022-06-07 RX ADMIN — PALONOSETRON 0.25 MG: 0.05 INJECTION, SOLUTION INTRAVENOUS at 09:41

## 2022-06-07 RX ADMIN — SODIUM CHLORIDE 250 ML: 9 INJECTION, SOLUTION INTRAVENOUS at 09:40

## 2022-06-07 RX ADMIN — DEXAMETHASONE SODIUM PHOSPHATE 12 MG: 10 INJECTION INTRAMUSCULAR; INTRAVENOUS at 09:40

## 2022-06-07 RX ADMIN — LEUCOVORIN CALCIUM 810 MG: 500 INJECTION, POWDER, LYOPHILIZED, FOR SOLUTION INTRAMUSCULAR; INTRAVENOUS at 10:54

## 2022-06-09 ENCOUNTER — INFUSION (OUTPATIENT)
Dept: ONCOLOGY | Facility: HOSPITAL | Age: 42
End: 2022-06-09

## 2022-06-09 ENCOUNTER — TELEPHONE (OUTPATIENT)
Dept: ONCOLOGY | Facility: CLINIC | Age: 42
End: 2022-06-09

## 2022-06-09 DIAGNOSIS — Z45.2 ENCOUNTER FOR FITTING AND ADJUSTMENT OF VASCULAR CATHETER: ICD-10-CM

## 2022-06-09 DIAGNOSIS — C20 ADENOCARCINOMA OF RECTUM: Primary | ICD-10-CM

## 2022-06-09 PROCEDURE — 25010000002 HEPARIN LOCK FLUSH PER 10 UNITS: Performed by: INTERNAL MEDICINE

## 2022-06-09 RX ORDER — SODIUM CHLORIDE 0.9 % (FLUSH) 0.9 %
10 SYRINGE (ML) INJECTION AS NEEDED
Status: CANCELLED | OUTPATIENT
Start: 2022-06-09

## 2022-06-09 RX ORDER — HEPARIN SODIUM (PORCINE) LOCK FLUSH IV SOLN 100 UNIT/ML 100 UNIT/ML
500 SOLUTION INTRAVENOUS AS NEEDED
Status: CANCELLED | OUTPATIENT
Start: 2022-06-09

## 2022-06-09 RX ORDER — HEPARIN SODIUM (PORCINE) LOCK FLUSH IV SOLN 100 UNIT/ML 100 UNIT/ML
500 SOLUTION INTRAVENOUS AS NEEDED
Status: DISCONTINUED | OUTPATIENT
Start: 2022-06-09 | End: 2022-06-09 | Stop reason: HOSPADM

## 2022-06-09 RX ORDER — SODIUM CHLORIDE 0.9 % (FLUSH) 0.9 %
10 SYRINGE (ML) INJECTION AS NEEDED
Status: DISCONTINUED | OUTPATIENT
Start: 2022-06-09 | End: 2022-06-09 | Stop reason: HOSPADM

## 2022-06-09 RX ADMIN — Medication 10 ML: at 09:15

## 2022-06-09 RX ADMIN — SODIUM CHLORIDE, PRESERVATIVE FREE 500 UNITS: 5 INJECTION INTRAVENOUS at 09:15

## 2022-06-09 NOTE — TELEPHONE ENCOUNTER
Patient had a positive Covid test last night.  [facility] right now, waiting for results of rapid Covid test.  She has an appointment to have her chemo unhooked to day in the office and should she be treated there or here for her covid

## 2022-06-09 NOTE — TELEPHONE ENCOUNTER
Patient is calling from [facility] this morning to report she has tested positive for COVID and patient is due to have her 5FU disconnected today 6/9/2022. Contacted Antonio and Jameel to inform them and to make a plan to discontinue the 5 FU. Patient was given phone number to contact the [facility] office on her arrival.  Patient states the APRN at the [facility] is asking fir recommendations from Dr Nguyen on what medications patient can take to help with the COVID. Dr Nguyen recommended Paxlovid and the APRN has prescribed.

## 2022-06-13 ENCOUNTER — APPOINTMENT (OUTPATIENT)
Dept: CT IMAGING | Facility: HOSPITAL | Age: 42
End: 2022-06-13

## 2022-06-14 NOTE — TELEPHONE ENCOUNTER
Caller: Carole Weaver    Relationship to patient: Self    Best call back number: [phone]    Patient is needing: TO KNOW WHERE WE ARE AT IN R/S HER APPTS. SHE WAS UNDER TH IMPRESSION HER APPTS THIS WEEK WOULD ALL BE CANCELED (SCANS, F/U AND INFUSION.) PLEASE CALL PATIENT WITH UPDATE.

## 2022-06-14 NOTE — TELEPHONE ENCOUNTER
Per Dr Nguyen patient's appointments for 6/21/22 need to be cancelled. Patient's CT scans need to be rescheduled for the first of next week and patient to follow up with Dr Nguyen either 6/23 or 6/24.

## 2022-06-17 ENCOUNTER — APPOINTMENT (OUTPATIENT)
Dept: LAB | Facility: HOSPITAL | Age: 42
End: 2022-06-17

## 2022-06-21 ENCOUNTER — HOSPITAL ENCOUNTER (OUTPATIENT)
Dept: CT IMAGING | Facility: HOSPITAL | Age: 42
Discharge: HOME | End: 2022-06-21
Admitting: INTERNAL MEDICINE

## 2022-06-21 DIAGNOSIS — C78.01 MALIGNANT NEOPLASM METASTATIC TO BOTH LUNGS: ICD-10-CM

## 2022-06-21 DIAGNOSIS — C20 ADENOCARCINOMA OF RECTUM: ICD-10-CM

## 2022-06-21 DIAGNOSIS — C78.02 MALIGNANT NEOPLASM METASTATIC TO BOTH LUNGS: ICD-10-CM

## 2022-06-21 PROCEDURE — 71260 CT THORAX DX C+: CPT

## 2022-06-21 PROCEDURE — 0 DIATRIZOATE MEGLUMINE & SODIUM PER 1 ML: Performed by: INTERNAL MEDICINE

## 2022-06-21 PROCEDURE — 25010000002 IOPAMIDOL 61 % SOLUTION: Performed by: INTERNAL MEDICINE

## 2022-06-21 PROCEDURE — 74177 CT ABD & PELVIS W/CONTRAST: CPT

## 2022-06-21 RX ADMIN — IOPAMIDOL 100 ML: 612 INJECTION, SOLUTION INTRAVENOUS at 19:17

## 2022-06-21 RX ADMIN — DIATRIZOATE MEGLUMINE AND DIATRIZOATE SODIUM 30 ML: 660; 100 LIQUID ORAL; RECTAL at 17:45

## 2022-06-23 ENCOUNTER — OFFICE VISIT (OUTPATIENT)
Dept: ONCOLOGY | Facility: CLINIC | Age: 42
End: 2022-06-23

## 2022-06-23 ENCOUNTER — APPOINTMENT (OUTPATIENT)
Dept: LAB | Facility: HOSPITAL | Age: 42
End: 2022-06-23

## 2022-06-23 ENCOUNTER — APPOINTMENT (OUTPATIENT)
Dept: OTHER | Facility: HOSPITAL | Age: 42
End: 2022-06-23

## 2022-06-23 VITALS
OXYGEN SATURATION: 95 % | SYSTOLIC BLOOD PRESSURE: 128 MMHG | BODY MASS INDEX: 36.46 KG/M2 | WEIGHT: 218.8 LBS | DIASTOLIC BLOOD PRESSURE: 94 MMHG | HEIGHT: 65 IN | TEMPERATURE: 97.3 F | HEART RATE: 87 BPM | RESPIRATION RATE: 16 BRPM

## 2022-06-23 DIAGNOSIS — I26.99 PULMONARY EMBOLISM WITHOUT ACUTE COR PULMONALE, UNSPECIFIED CHRONICITY, UNSPECIFIED PULMONARY EMBOLISM TYPE: ICD-10-CM

## 2022-06-23 DIAGNOSIS — G62.0 DRUG-INDUCED PERIPHERAL NEUROPATHY: ICD-10-CM

## 2022-06-23 DIAGNOSIS — C78.02 MALIGNANT NEOPLASM METASTATIC TO BOTH LUNGS: ICD-10-CM

## 2022-06-23 DIAGNOSIS — D68.51 HETEROZYGOUS FACTOR V LEIDEN MUTATION: Chronic | ICD-10-CM

## 2022-06-23 DIAGNOSIS — Z79.01 ANTICOAGULATION ADEQUATE: ICD-10-CM

## 2022-06-23 DIAGNOSIS — C20 ADENOCARCINOMA OF RECTUM: Primary | ICD-10-CM

## 2022-06-23 DIAGNOSIS — C78.01 MALIGNANT NEOPLASM METASTATIC TO BOTH LUNGS: ICD-10-CM

## 2022-06-23 PROCEDURE — 99214 OFFICE O/P EST MOD 30 MIN: CPT | Performed by: INTERNAL MEDICINE

## 2022-06-25 PROBLEM — C18.9 MALIGNANT NEOPLASM OF COLON: Chronic | Status: RESOLVED | Noted: 2021-01-15 | Resolved: 2022-06-25

## 2022-06-25 NOTE — PROGRESS NOTES
REASON FOR FOLLOW UP:     1. Rectal cancer, rectal ultrasound examination in July 2019 reported T3N0 disease without lymphadenopathy. She does have small pulmonary nodule 6-7 mm and 2 mm with indeterminate feature. There is no solid evidence of metastatic disease otherwise. Patient has stage IIA (T3N0M0) disease.  2. The patient is heterozygous factor V Leiden, was on prophylactic anticoagulation with Lovenox 40 mg daily given her increased risk of thrombosis with MediPort and GI malignancy.   3. PET scan on 8/8/2019 reported an 8 mm hypermetabolic right upper lobe pulmonary nodule, which is suspicious for metastatic as well.    4. Patient had a PowerPort placement on the left upper chest by Dr. Joseph on 8/9/2019.  5. Patient was started on concurrent infusional 5-FU chemoradiation therapy on 8/12/2019, with planned complete surgical resection and further adjuvant chemotherapy with intention to cure the disease.   6. Patient underwent surgical resection of the primary rectal cancer by Dr. Ye on 12/2/2019.  Pathology evaluation reported residual T3N1 disease stage IIIb.  7. Diarrhea related to therapy and radiation.   8. Patient was started cycle 1 day 1 of adjuvant FOLFOX 6 on 1/23/2020.  9. On 2/5/2020 FOLFOX 6 cycle 2 delayed secondary to neutropenia.  Patient was given 3 days of Granix injection.  After cycle #2 we planned 3 days of Granix with each cycle.   10. Patient also intolerant of oral iron.  Patient received 2 doses of IV injectafer on 02/05/2020 and 02/12/2020.   11. 02/12/2020 Proceed with cycle #2 of FOLFOX 6 with 3 days of Granix.  12. On 3/11/2020 cycle 4 postponed secondary to abdominal pain and occasional low-grade fevers.  CT scans ordered.  13. Cycle #4 resumed after CT scan revealed no evidence of disease.  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 Ye. patient has no fever.  Continue to observe.   14. Grade 3  hand-foot syndrome secondary to 5-FU after cycle #7 FOLFOX 6 chemotherapy, prompting ER visit.  Also has worsening peripheral neuropathy.   15. Cycle #8 FOLFOX 6 was given on 5/13/2020, with 50% dose reduction for both 5-FU and oxaliplatin, and also examination of bolus 5-FU.   16. PET scan examination on 5/21/2020 reported no evidence of metastatic disease in the chest abdomen pelvis.  Stable 6 mm RUL pulmonary nodule.  17. On 5/27/2020, I discussed with the patient that we can discontinue chemotherapy at this time.   18. Patient had a surgical procedure for low anterior colon resection, coloanal anastomosis on 8/3/2020.  19. CT scan for chest abdomen pelvis on 9/9/2020 reported a new 8 mm noncalcified pulmonary nodule in the left lower lobe of the lung.  Otherwise stable right upper lobe 6 mm pulmonary nodule, and no evidence of disease recurrence in the abdomen or pelvis.  20. PET scan examination on 9/18/2020 reported multiple hypermetabolic small pulmonary nodules/ new pulmonary nodules.   21. Patient was started on pelvic chemotherapy with FOLFIRI regimen on 10/13/2020.   22. 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 It reported tumor mutation burden 5 Muts/Mb, microsatellite stable, TP53 mutation R282W, and others.   23. Cycle #5 was without irinotecan, due to peripheral neuropathy.  24. 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  Patient was switched to weight-based Lovenox injection.  25. Cycle #6 5-FU and irinotecan was delayed by 2 weeks because of the above incident.  26. Patient had a telemedicine evaluation at that the [facility] in February 2021.  They agreed with our treatment plan for palliative chemotherapy followed by maintenance chemotherapy.    27. PET scan examination on 4/6/2021 after cycle #12 palliative chemotherapy reported no  evidence of hypermetabolic metastatic lesion.   28. Patient was started on maintenance chemotherapy with 5-FU and Avastin on 4/13/2021. (Unable to tolerate Xeloda because of a significant hand-foot syndrome).   29. PET scan on 9/24/2021 obtained after cycle #12 maintenance chemotherapy with 5-FU, leucovorin, Avastin reported no evidence for active disease. We recommended 3 months chemotherapy holiday.  30. CT scan examination on 3/15/2022 reported slightly disease progression with increase in size of small pulmonary nodule.  We started patient back on maintenance chemotherapy on 3/29/2022 treatment with 5-FU plus leucovorin and Avastin, repeating every 2 weeks.  31. For 6 more maintenance chemotherapy, CT scan for chest abdomen pelvis was done on 6/21/2022.      HISTORY OF PRESENT ILLNESS:  The patient is a 41 y.o. female with the above-mentioned issue who is here today for evaluation to discuss results of CT scan examination obtained 2 days ago on 6/21/2022.  Patient is accompanied by her father today.    Patient reports she is doing well, excellent performance status ECOG 0.  She has been eating well, no weight loss, no nausea no vomiting.  No chest pain no cough no hemoptysis.      Patient recently had COVID-19 infection diagnosed on 6/9/2022 despite it was fully vaccinated and boosted.  She recovered without problem.    Patient had last maintenance chemotherapy on 6/7/2022. She continues to have issues with worse hand/foot syndrome symptoms for a few days following treatments.      She continues Lovenox injections and denies any significant bleeding issues.  Her ostomy continues to function without difficulty.  No other new problems or concerns.     CT scan for chest abdomen pelvis 6/21/2022 reported sub-6 mm pulmonary nodules either stable or slightly smaller.  No new pulmonary nodules or evidence for disease progression.          Past Medical History:   Diagnosis Date   • Abdominopelvic abscess (HCC)  08/12/2020    ADMITTED TO [facility]   • Abnormal Pap smear of cervix 02/02/1998    JULIUS I   • Anemia in pregnancy    • Anemia in pregnancy    • Anxiety    • Bilateral epiphora 04/2020   • Bilateral hand swelling 05/02/2020    SEEN AT [facility] ER   • Cervical lymphadenitis 06/06/2012    SEEN AT [facility] ER   • Chemotherapy induced diarrhea 01/2021   • Chemotherapy induced neutropenia (HCC) 04/2020   • Chemotherapy-induced nausea 02/2020   • Chemotherapy-induced thrombocytopenia 05/2020   • Chronic diarrhea    • Colon polyps     FOLLOWED BY DR. GERONIMO ESPARZA   • Cystitis 04/24/2020    WITH DEHYDRATION, ADMITTED TO [facility]   • Cystitis 10/27/2020   • Depression    • Drug-induced peripheral neuropathy ([facility]) 05/2020   • Factor V Leiden mutation ([facility])    • Fistula of intestine    • GERD (gastroesophageal reflux disease)    • Hand foot syndrome 05/2020   • Heart murmur     IN CHILDHOOD   • Hematochezia    • Hemorrhoids    • Heterozygous factor V Leiden mutation ([facility])     DX 8-2-2019   • History of anemia    • History of chemotherapy 2019    FOLLOWED BY DR. ALEXANDRU HUNT   • History of gestational diabetes    • History of pre-eclampsia 1998   • History of radiation therapy 2019    FOLLOWED BY DR. JAVON LEWIS   • History of TB skin testing 01/17/2009    TB Skin Test   • HPV in female 1998   • Hypokalemia 09/2019   • Hypomagnesemia 09/2019   • Hyponatremia 06/2021   • IBS (irritable bowel syndrome)    • Ileostomy in place ([facility])     FOLLOWED BY DR. 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   • Perirectal abscess 08/12/2020   • Pulmonary embolism without acute cor pulmonale  (HCC) 09/20/2019    X 3; ADMITTED TO [facility]   • Pulmonary nodule, right 2020   • Rectal cancer (HCC) 2019    STAGE IIA, INVASIVE MODERATELY DIFFERENTIATED ADENOCARCINOMA, CHEMO AND XRT FINISHED 2019   • Right shoulder pain 2020    SEEN AT [facility] ER   • Right ureteral stone 10/01/2019    SEEN AT [facility] ER   • SAB (spontaneous ) 1996     A2-1 INDUCED   • Sciatica    • Sepsis due to cellulitis (HCC) 2002    LEFT GREAT TOE, ADMITTED TO [facility]   • Tachycardia 2020   • Thrombosis of superior vena cava (HCC) 2020    AND BRACHIOCEPHALIC VEIN, ADMITTED TO [facility]   • Urinary urgency 2020     Past Surgical History:   Procedure Laterality Date   • ABDOMINAL SURGERY     • CHOLECYSTECTOMY N/A 10/10/2003    LAPAROSCOPIC WITH CHOLANGIOGRAM, DR. JAMEY TALAVERA AT [facility]   • COLON RESECTION N/A 2019    Procedure: laparoscopic low anterior colon resection with mobilization of splenic flexure and diverting loop ileostomy: ERAS;  Surgeon: Padmaja Esparza MD;  Location: [facility];  Service: General   • COLON RESECTION N/A 8/3/2020    Procedure: LOW ANTERIOR COLON RESECTION, COLOANAL ANASTOMOSIS, MOBILIZATION SPLENIC FLEXURE;  Surgeon: Padmaja Esparza MD;  Location: [facility];  Service: General;  Laterality: N/A;   • COLONOSCOPY N/A 7/15/2020    PATENT ANASTAMOSIS IN MID RECTUM, RESCOPE IN 1 YR, DR. PADMAJA ESPARZA AT [facility]   • COLONOSCOPY W/ POLYPECTOMY N/A 2019    15 MM TUBULOVILLOUS ADENOMA POLYP IN HEPATIC FLEXURE, 20 MMTUBULOVILLOUS ADENOMA WITH HIGH GRADE DYSPLASIA IN RECTOSIGMOID, 4 CM MASS IN MID RECTUM, PATH: INVASIVE MODERATELY DIFFERENTIATED ADENOCARCINOMA, DR. JENNIFER LI AT [facility]   • DILATATION AND EVACUATION N/A    • ENDOSCOPY N/A 2019    LA GRADE A ESOPHAGITIS, GASTRITIS, ALL BIOPSIES BENIGN, DR. JENNIFER LI AT [facility]   • INCISION AND DRAINAGE PERIRECTAL ABSCESS N/A 2020    Procedure: INCISION AND DRAINAGE OF  retrorectal dehiscence abcess with drain placement and irrigation;  Surgeon: Geronimo Ye MD;  Location: [facility] MAIN OR;  Service: General;  Laterality: N/A;   • PAP SMEAR N/A 01/24/2014   • SIGMOIDOSCOPY N/A 7/24/2019    INFILTRATIVE PARTIALLY OBSTRUCTING LARGE RECTAL CANCER, AREA TATOOED, DR. GERONIMO YE AT [facility]   • SIGMOIDOSCOPY N/A 11/23/2019    AN ULCERATED PARTIALLY OBSTRUCTING MASS IN MID RECTUM, AREA TATTOOED, DR. GERONIMO YE AT [facility]   • SIGMOIDOSCOPY N/A 7/22/2021    PATENT ANASTAMOSIS IN DISTAL RECTUM, RESCOPE IN 1 YR, DR. GERONIMO YE AT [facility]   • TRANSRECTAL ULTRASOUND N/A 7/24/2019    Procedure: ULTRASOUND TRANSRECTAL;  Surgeon: Geronimo Ye MD;  Location: [facility] ENDOSCOPY;  Service: General   • URETEROSCOPY LASER LITHOTRIPSY WITH STENT INSERTION Right 10/30/2019    DR. ESTUARDO BEASLEY AT [place]   • VAGINAL DELIVERY N/A 09/18/1998   • VENOUS ACCESS DEVICE (PORT) INSERTION Left 8/9/2019    Procedure: INSERTION VENOUS ACCESS DEVICE;  Surgeon: Sj Joseph MD;  Location: [facility];  Service: General   • VENOUS ACCESS DEVICE (PORT) INSERTION N/A 12/18/2020    Procedure: INSERTION VENOUS ACCESS DEVICE right side, removal venous access device left side;  Surgeon: Sj Joseph MD;  Location: [facility];  Service: General;  Laterality: N/A;   • WISDOM TOOTH EXTRACTION Bilateral 1993       HEMATOLOGIC/ONCOLOGIC HISTORY:      The patient reports she has intermittent rectal bleeding and urgency, mucous with her stool, starting sometime in 2016. At that time she was referred to GI service, and was diagnosed of irritable bowel syndrome. Nevertheless she had worsening urgency for bowel movement, and had a couple of incidents losing control of stool. She was recently seen by Roland Thorpe MD again and had colonoscopy and EGD exam on 07/08/2019. She was found having a circumferential rectal mass. According to the procedure note, the patient had a fungating circumferential bleeding 4 cm mass in  the middle rectum, at distance between 13 cm and 17 cm from the anus. Mass was causing partial obstruction. There were also colon polyps found at the hepatic flexure and also at the rectosigmoid junction 23 cm from the anus. Both were resected and retrieved. EGD examination reported grade A esophagitis at the GE junction and patchy discontinuous erythema and aggravation of the mucosa without bleeding in the stomach body. There is normal mucosa of the duodenum. Pathology evaluation from this procedure reported moderately differentiated adenocarcinoma involving the rectal mass. The rectal sigmoid junction polyp was tubular/tubulovillous adenoma with high grade dysplasia negative for invasive malignancy. The hepatic flexure polyp was also tubular/tubulovillous adenoma negative for high grade dysplasia or malignancy. The biopsy from the esophagus reports squamocolumnar mucosa with inflammatory changes suggestive of mild reflux esophagitis, negative for interstitial metaplasia. Gastric biopsy was benign and duodenal biopsy was also benign. There is no mention of H-pylori.     Patient was subsequently referred to colorectal surgeon Padmaja Ye MD for further evaluation. The patient had CT scan examination for chest, abdomen and pelvis requested by Dr. Ye and were done on 07/13/2019. The study reported no evidence of lymphadenopathy in the abdomen and pelvis. There was wall thickening of the rectosigmoid junction. Normal spleen, pancreas, adrenal glands and kidneys. There was fatty liver infiltration but no focal lymphatic lesions. There was a small 6-7 mm noncalcified nodule in the right upper lobe and a tiny 3 mm subpleural nodule in the right middle lobe. No mediastinal or hilar lymphadenopathy.     Dr. Ye performed staging rectal ultrasound examination. This study reported tumor penetrating through the muscularis propria as T3 disease; there were no lymph nodes identified.    She had a hospitalization in late  September 2019 because of newly discovered pulmonary emboli.  She was on prophylactic Lovenox prior to the incident of PE.  Now she is on full dose Xarelto anticoagulation.  Patient reports no further chest pain dyspnea.  She denies bleeding or bruising.  During her hospitalization, she was seen by Dr. Ye, who plans to have surgery 8 to 12 weeks after finishing radiation therapy.  She finished her radiation on 9/19/2019.     I noticed patient also presented to the emergency room on 10/1/2019 complaining of right flank area pain.  I reviewed the images studies and indeed she has a very small 1 to 2 mm obstructing kidney stone in the UV junction.  Patient is still symptomatic with some pains and dysuria.  She denies fever sweating or chills.    Laboratory study on 10/7/2019 reported normal CBC including hemoglobin 13.1, platelets 301,000, WBC 6170 and ANC 4900 lymphocytes 590 monocytes 480.      The nurse reported malfunction of port-a-catheter on 10/7/2019.  Port study on 10/8/2019 showed fibrin sheath around the distal aspect of the Mediport catheter in the SVC. This does not appear to hinder injection, but does prevent aspiration at this time.    Patient underwent surgical resection of the colon on 12/2/2019 with Dr. Ye.  Pathology evaluation reported residual T3 disease, also 1 out of 14 lymph nodes positive for malignancy.  So this patient in either had at least stage IIIb disease (T3 N1 M0?).      Adjuvant chemotherapy FOLFOX 6 will be started on 1/22/2020.  Laboratory study reported iron saturation 10%, free iron 31 TIBC 319 and ferritin 168.  Her hemoglobin was 11.8, WBC 5600, and platelets 347,000.    Patient was here on 02/12/2020 for cycle 2 of her FOLFOX.  This is delayed x1 week secondary to neutropenia.  The patient ultimately received 3 days worth of Neupogen with recovery of her blood counts.  Of note, the patient struggled with significant nausea without any episodes of vomiting following cycle  #1 of chemotherapy resulting in significant weight loss.  She is up 12 pounds over the last week as her appetite has normalized.  We will add Emend to her care plan.    She is having several loose stools per her colostomy and has been hesitant to take Imodium due to prior history of constipation.  I encouraged her to try this with a maximum of 8 tablets/day.  She denies any infectious symptoms including fevers or chills.  No excess bleeding or bruising noted.  She had the expected cold sensitivity related to the Oxaliplatin for about 3 days following treatment.    Labs from 02/12/2020 demonstrated total white blood cell count of 5.14, ANC of 3.06, hemoglobin of 11.2, platelets of 211,000.  She was found to be iron deficient last week and is intolerant to oral iron secondary to GI distress.  For this reason, she initiated IV iron therapy with Injectafer last week.  She had received her second dose 02/12/2020    Patient has normalized hemoglobin 12.2 on 2/26/2020.    She reported on 5/5/2020 she had a recent visit to the emergency department for what was suspected to be an allergic reaction.  She called our on-call service on Saturday with reports of hand and face swelling.  She was instructed to proceed to the emergency department at which time she was assessed and prescribed a Medrol Dosepak.  She had just completed her 5-FU and was unhooked on Friday, 5/3/2020.  Her symptoms have improved.  She does report persistent hyperpigmentation and mild swelling of the palms of the hands but this is much improved.  It was her right hand specifically that was swollen.  Her facial swelling has resolved.  She continues on Cefdinir nd since has 1 day remaining, she was prescribed cefdinir for a UTI requiring hospitalization at the end of April.  Reports no new symptoms.  Her labs are stable.  She is scheduled for treatment again.    Patient states at this time she is not tolerating her chemotherapy well.     Patient seen in the  emergency department on 5/2/2020 for what was suspected to be an allergic reaction.  She called our on-call service on Saturday explaining that she was experiencing hand and face swelling.  She was instructed to proceed to the emergency department at which time she was assessed and prescribed a Medrol Dosepak.  She had just completed her 5-FU and was unhooked on Friday, 5/1/2020.      She reports since her ED visit on 5/2/2020 her symptoms have not improved. Her hands and feet were swollen upon presenting to the ED and she could barely make a fist. She states that she feels so swollen she is not able to stand it. She states that her skin on the hands and feet are peeling extensively as well, besides changing color to more dark.     She also reports significant fatigue after her first week of the 5-FU treatment but she expected this side effect. She also notices significant increase in her neuropathy to the point that she is not able to even walk around in her home without her house slippers due to irritation from her rugs. She denies and associated nausea or vomiting at this time. She also has episodes of epistaxis every day after the chemotherapy cycle #7.  She does report working full-time during the week of chemotherapy.    Laboratory studies, 5/13/2020, show moderate thrombocytopenia platelets 123,000, low normal WBC 4140 including ANC 2720 and normal hemoglobin 13.4.  Because significant hand-foot syndrome, will decrease both 5-FU and oxaliplatin by 50%, and eliminate bolus dose of 5-FU.    On 5/21/2020 patient had a PET scan performed which showed no convincing evidence of residual disease in abdomen or pelvis, no metastatic disease within the chest or neck.     Cycle #8 FOLFOX 6 was given on 5/13/2020, with 50% dose reduction for both 5-FU and oxaliplatin, and also examination of bolus 5-FU.  She states on 5/27/2020 that with the recent reduction of the chemotherapy she feels significantly better. She has  more energy and is able to do her daily routine.      PET scan examination on 5/21/2020 reported no evidence of metastatic disease in chest abdomen pelvis.  There was a stable 6 mm right upper lobe pulmonary nodule.    Laboratory studies on 5/27/2020 showed mild leukocytopenia WBC 3720 but a normal ANC 2250 and lymphocytes 630.  Normal platelets 163,000 and hemoglobin 12.6.  Chemistry lab reported normal renal function, liver function panel and a electrolytes, elevated glucose 164.    Laboratory studies 6/24/2020, showed normal hemoglobin 13.4 but macrocytosis .9.  Normal platelets 210,000 and WBC 5870.  She had normal CMP.     Patient last time was here in late June 2020.  Since that time she had surgical procedure for low anterior colon resection, coloanal anastomosis on 8/3/2020.  She later developed a perirectal abscess, required surgical drainage on 8/14/2020.  She was discharged on 8/27/2020.    Patient reports to me she still has lower abdominal wall vacuum suction in place.  She denies fever sweating chills.  Performance status is ECOG 1.  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  She does have visiting nurse come to the home for wound care.    CT scan for chest abdomen pelvis on 9/9/2020 reported a new 8 mm noncalcified pulmonary nodule in the left lower lobe.  Otherwise stable right upper lobe 6 mm pulmonary nodule, and no evidence of disease recurrence in the abdomen or pelvis.     Laboratory study on 9/16/2020 reported elevated AST 55, ALT 95, alk phosphatase 143 but normal total bilirubin 0.3.  Chemistry lab otherwise unremarkable.  She has completed normal CBC.      Because of the abnormal CT scan examination for chest abdomen pelvis on 9/9/2020 reported a new 8 mm noncalcified pulmonary nodule in the left lower lobe, we obtained a PET scan examination for further evaluation, which was done on 9/18/2020.  This study reported several pulmonary nodules, some of them  are hypermetabolic, newly developed compared to previous PET scan in May 2020.  Certainly does highly suspicious for metastatic disease.  There are also hypermetabolic activity in the abdomen and pelvic area which is hard to differentiate from surgical scar versus metastatic malignancy.    Laboratory study on 10/13/2020 reported normal CBC with Hb 13.9, platelets 302,000 and WBC 5520 including ANC 3830.  Chemistry lab reported normalized AST 20, alk phosphatase 116 improved ALT 35, and maintains normal bilirubin, with normal electrolytes and liver function panel.     Patient was started on first cycle of palliative chemotherapy FOLFIRI on 10/13/2020.    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  Patient had a new port placed 12/18/2020, and had held her Xarelto for 2 days prior to surgery.  She presented to the ER on 12/21/20 with complaints of facial and arm swelling for 3 days.  She also noted increased shortness of breath.  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  Thrombus within the right internal jugular vein cannot be excluded.  Patient was started on IV heparin, and eventually transitioned to weight-based Lovenox, which she now continues.    PET scan examination on 9/24/2021 reported further shrinking of the tiny right upper lobe pulmonary nodule.  Otherwise no new lesions.  No evidence for metastatic disease in other areas.      Patient had CT scan for chest with IV contrast obtained on 12/14/2021 which reported small tiny stable pulmonary nodules. There is a 4 mm right upper lobe pulmonary nodule. There is also a stable 4 mm left lower lobe pulmonary nodule. There is also stable left upper lobe micronodule.     Laboratory studies today on 12/21/2021 reported normal hemoglobin 15.9 however .0, platelets  218,000 WBC 5360 including neutrophils [id number] lymphocytes [id number]. Chemistry lab reported normal renal function, electrolytes, glucose, and marginally elevated ALT 55, AST 34 but normal total bilirubin and alk phosphatase.       MEDICATIONS    Current Outpatient Medications:   •  clonazePAM (KlonoPIN) 1 MG tablet, TAKE 1 TABLET BY MOUTH 3 TIMES A DAY AS NEEDED FOR ANXIETY OR SEIZURES, Disp: 90 tablet, Rfl: 1  •  Cyanocobalamin (VITAMIN B-12 PO), Take  by mouth., Disp: , Rfl:   •  dicyclomine (BENTYL) 20 MG tablet, TAKE 1 TABLET BY MOUTH EVERY 6 HOURS AS NEEDED, Disp: 360 tablet, Rfl: 0  •  diphenoxylate-atropine (LOMOTIL) 2.5-0.025 MG per tablet, Take 1 tablet by mouth 4 (Four) Times a Day As Needed for Diarrhea., Disp: 120 tablet, Rfl: 1  •  Enoxaparin Sodium (LOVENOX) 100 MG/ML solution prefilled syringe syringe, Inject 1 mL under the skin into the appropriate area as directed Every 12 (Twelve) Hours., Disp: 60 mL, Rfl: 2  •  escitalopram (LEXAPRO) 20 MG tablet, TAKE 1 TABLET BY MOUTH EVERY DAY, Disp: 90 tablet, Rfl: 3  •  famotidine (PEPCID) 20 MG tablet, TAKE 1 TABLET BY MOUTH TWICE A DAY, Disp: 180 tablet, Rfl: 1  •  fluorouracil in dextrose 5 % 250 mL infusion, Infuse  into a venous catheter 1 (One) Time. Takes twice a month., Disp: , Rfl:   •  Loratadine 10 MG capsule, Take 10 mg by mouth Every Evening., Disp: , Rfl:   •  metoprolol succinate XL (TOPROL-XL) 25 MG 24 hr tablet, Take 0.5 tablets by mouth Every Night., Disp: 45 tablet, Rfl: 3  •  mineral oil-hydrophilic petrolatum (AQUAPHOR) ointment, Apply 1 application topically to the appropriate area as directed As Needed for Dry Skin., Disp: , Rfl:   •  ondansetron (ZOFRAN) 8 MG tablet, Take 1 tablet by mouth 3 (Three) Times a Day As Needed for Nausea or Vomiting., Disp: 60 tablet, Rfl: 1  •  promethazine (PHENERGAN) 25 MG tablet, Take 1 tablet by mouth Every 6 (Six) Hours As Needed for Nausea or Vomiting., Disp: 60 tablet, Rfl: 2    ALLERGIES:   No Known  "Allergies    SOCIAL HISTORY:       Social History     Tobacco Use   • Smoking status: Current Every Day Smoker     Packs/day: 1.00     Years: 24.00     Pack years: 24.00     Types: Electronic Cigarette, Cigarettes   • Smokeless tobacco: Never Used   • Tobacco comment: 1 PPD   Vaping Use   • Vaping Use: Some days   • Substances: Nicotine   • Devices: Disposable   Substance Use Topics   • Alcohol use: Not Currently     Comment: Caffeine use rare   • Drug use: No         FAMILY HISTORY:   Mother has positive factor V Leiden mutation, although she did not have thrombosis, mother also is coronary disease with stenting, she also is occasional bruising.  Maternal grandmother had DVT, she had multiple surgical procedures.  Patient mother's half-brother had metastatic colon cancer at diagnosis in his 50s.  Maternal great aunt has breast cancer.  Patient will follow his skin cancer in his 60s, exclusive.         Vitals:    06/23/22 0756   BP: 128/94   Pulse: 87   Resp: 16   Temp: 97.3 °F (36.3 °C)   TempSrc: Temporal   SpO2: 95%   Weight: 99.2 kg (218 lb 12.8 oz)   Height: 165 cm (64.96\")   PainSc: 0-No pain     Current Status 6/23/2022   ECOG score 0     Physical Exam  Vitals reviewed.   Constitutional:       General: She is not in acute distress.     Appearance: Normal appearance. She is well-developed.   HENT:      Head: Normocephalic and atraumatic.      Comments: Patient wears mask due to the pandemic coronavirus infection.     Eyes:      Conjunctiva/sclera: Conjunctivae normal.   Cardiovascular:      Rate and Rhythm: Normal rate and regular rhythm.      Heart sounds: Normal heart sounds. No murmur heard.  Pulmonary:      Effort: Pulmonary effort is normal. No respiratory distress.      Breath sounds: Normal breath sounds.   Abdominal:      General: Bowel sounds are normal. There is no distension.      Palpations: Abdomen is soft.      Tenderness: There is no abdominal tenderness.      Comments: Ostomy in right abdomen. " Scattered bruises on the abdomen bilaterally from Lovenox injections.   Musculoskeletal:         General: No swelling.      Cervical back: Normal range of motion.   Skin:     General: Skin is warm and dry.      Findings: No bruising.      Comments: Hyperpigmentation of the nailbeds with mild dryness of the skin.  Areas of hyperpigmentation inside of her left wrist, right outer ankle.     Neurological:      Mental Status: She is alert and oriented to person, place, and time. Mental status is at baseline.   Psychiatric:         Mood and Affect: Mood normal.         Thought Content: Thought content normal.         RECENT LABS:                     Lab Results   Component Value Date    GLUCOSE 98 06/07/2022    BUN 10 06/07/2022    CREATININE 0.88 06/07/2022    EGFRIFNONA 79 12/21/2021    BCR 11.4 06/07/2022    K 4.8 (H) 06/07/2022    CO2 20.7 (L) 06/07/2022    CALCIUM 9.3 06/07/2022    ALBUMIN 3.80 06/07/2022    AST 31 06/07/2022    ALT 47 (H) 06/07/2022         IMAGING:  CT Chest With Contrast Diagnostic, CT Abdomen Pelvis With Contrast  Narrative: CT CHEST, ABDOMEN, AND PELVIS WITH IV CONTRAST     HISTORY: 41-year-old rectal cancer, restaging.     TECHNIQUE: Radiation dose reduction techniques were utilized, including  automated exposure control and exposure modulation based on body size.   3 mm images were obtained through the chest, abdomen, and pelvis after  the administration of IV contrast.      COMPARISON: 03/15/2022     FINDINGS:        Chest:      Port in the right chest wall with the catheter terminating in the  superior vena cava. Stenosis of the superior vena cava and left  subclavian artery, similar in appearance to the prior.     Multiple collaterals in the chest wall and upper abdomen similar to the  prior. Multiple collaterals in the ventral and right lateral chest wall.     Heterogeneous thyroid with a stable hypodensity in the left lobe which  could be evaluated with ultrasound if clinically  indicated.  Subcentimeter mediastinal and hilar nodes not significantly changed from  the prior. A previously measured right precarinal node is 7 mm. The  heart size is stable. Trace pericardial fluid and/or thickening. Ensure  up-to-date with mammography. Patent central airway. No focal  consolidations, effusions, or pneumothorax. Right lower lobe atelectasis  with pleural and parenchymal scarring. Respiratory motion artifact. A  previously hypermetabolic nodule in the right upper lobe is smaller at 3  mm (previously 5 mm) best demonstrated on axial image 32. A 5 mm nodule  in the left upper lobe is smaller at 4 mm (image 36). Nodule in the left  upper lobe and a ground glass opacity are not as conspicuous on the  current exam. A 3 mm nodule in the left lower lobe (image 41) is  unchanged. Hypertrophic degenerative changes are again noted in  thoracolumbar spine.     Abdomen and pelvis:      Diffuse hepatic steatosis which limits evaluation for underlying liver  lesions. Cholecystectomy. Multiple collaterals in the upper abdomen,  similar to the prior. The spleen, pancreas, adrenal glands, and kidneys  are within normal limits.     Status post low anterior resection with a right lower quadrant  ileostomy. No obstruction. Presacral masslike thickening with associated  clips or calcifications is similar to the prior approximating 1.7 cm in  greatest AP dimension.     Reflux of contrast into the intrahepatic/infrahepatic IVC. Varices in  the abdominal wall, similar to the prior. Scattered calcific  atherosclerosis.     Subcentimeter abdominal and pelvic nodes, not enlarged by CT criteria.     The bladder is incompletely distended. Uterus in situ. Multiple  subcutaneous soft tissue inflammatory changes in the ventral abdominal  wall suggesting sequela of prior injection sites. No free fluid, free  air, or extraluminal contrast. Multilevel degenerative changes.  Sclerotic foci in the pelvis are similar to the prior. If  symptomatic,  bone scan could be considered.     Impression: 1.  Sub-6 mm pulmonary nodules are stable or slightly smaller. No new  masses.  2.  Status post low anterior resection with right lower quadrant  ileostomy and stable presacral soft tissue thickening. No new findings  to suggest recurrent or metastatic disease. Recommend correlation with  tumor markers and continued active surveillance.  3.  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  4.  Please see above for additional chronic findings.     This report was finalized on 6/22/2022 12:26 PM by Dr. Jose Anthony M.D.             ASSESSMENT:   1.  Metastatic rectal cancer.   · Initial rectal ultrasound examination reported T3N0 disease without lymphadenopathy.   · CT scan of chest, abdomen and pelvis reported no lymphadenopathy in the abdomen, pelvis or chest. She does have small pulmonary nodule 6-7 mm and 2 mm with indeterminate feature. There is no solid evidence of metastatic disease otherwise.   · Based on the CT scan and rectal ultrasound imaging studies, this patient had stage IIA (T3N0M0) disease.   · She had PET scan examination on 8/8/2019 which reported a hypermetabolic right upper lobe pulmonary nodule 6 mm with SUV 5.6.  This is suspicious for metastatic disease however it is too small to be biopsied.  This patient may have stage IV disease.   · She initiated concurrent radiation with continuous 5-FU on 8/12/2019.  · Patient finished concurrent chemoradiation therapy.  · Patient underwent surgical resection of the rectal tumor and diverting loop ileostomy on 12/2/2019 with Dr. Ye.  Pathology evaluation reported residual T3 disease, with 1 out of 14 lymph nodes positive for malignancy.  Certainly this patient has at least stage IIIb rectal cancer (T3 N1 M0?)  · On 1/22/2020 adjuvant chemotherapy FOLFOX 6 regimen initiated.   · On 2/5/2022 cycle #2 was delayed secondary  to neutropenia.  She was given 3 days of Granix.   · Emend added with cycle 3.  With improved nausea control  · Continuing home Granix x3 days following 5-FU disconnect  · 3/11/2020 due for cycle 4, however, she is experiencing progressive abdominal pain and occasional fevers.   · CT scan performed on 3/13/2020 reveals no evidence of progressive or recurrent disease.  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  · Patient hospitalized 4/24-4/26/20 after cycle 5 of chemotherapy with acute UTI.  CT abdomen/pelvis noting fluid collection in the presacral region having diminished in size compared to CT dated 3/13/2020.  Patient was evaluated by Dr. Ye while in the hospital with further plans to evaluate possible colovaginal fistula following completion of chemotherapy.  Patient did respond to IV antibiotics and discharged home on oral cefdinir.  · 4/29/2020 cycle 6 of FOLFOX.  Urinary symptoms have resolved   · Patient seen in the [facility] [place], operated by [facility] ED on 5/2/2020 for what was suspected to be an allergic reaction.  She called our service on Saturday explaining that she was experiencing hand and face swelling.  She was instructed to proceed to the emergency department at which time she was assessed and prescribed a Medrol Dosepak.  She had just completed her 5-FU and was unhooked on Friday, 5/1/2020.  Her symptoms have resolved in the office on assessment, 5/5/2020.  · The patient had grade 3 hand-foot syndrome based on symptomology.  Patient had cycle #8 of 5-FU on 5/13/2020. Due to her symptoms and poor tolerance to the 5-FU, her treatment dose will be reduced 50% for oxaliplatin and infusional 5-FU.  Bolus 5-FU will be discontinued..  · On 5/21/2020 patient had a PET scan and it showed no evidence of of metastatic disease in the neck, chest, abdomen or pelvis.  There was fluid accumulation/abscess.   · On 5/27/2020 discussed with the patient that we can discontinue chemotherapy at this time.  She will follow-up with   [facility] to discuss a possibility to reverse the ileostomy.  We likely will obtain anther PET scan in 3 to 4 months for reassessment.    · Patient was seen by Dr. Ye on 6/19/2019 for evaluation and to discuss possible take down of her ileostomy.  She is scheduled to have a gastrografin enema on 7/2/2020 to evaluate for a fistula, and then a colonoscopy on 7/15/2020, both done by Dr. Ye.  She states that based on the above imaging and procedure findings, she may have a more extensive surgery done or just have her ileostomy reversed, which would likely be done in August 2020.  This will all be coordinated under the care of Dr. Ye.     · CT scan from 09/09/2020 and also compared to her previous PET scan examination from 05/21/2020 and also the original PET scan from 08/09/2019.  The original PET scan there is a small right upper lobe pulmonary nodule 6 mm with SUV 5.6. So in the 05/2020 PET scan the nodule is still there but activity seems much less and this most recent CT scan examination also documented the preexisting small pulmonary nodule however there is a new left lower lobe pulmonary nodule 8 mm in size and I shared with the patient today this nodule is suspicious for metastatic disease. Laboratory studies reported minimal CEA level 1.32 on 09/09/2020. Liver function panel was only marginally abnormal with the ALT 45, the remaining is normal. However laboratory study today showed worsening AST 55, ALT 95 and alkaline phosphatase 143 but is still normal, total bilirubin 0.3.   · Recommended to have repeat PET scan examination for assessment because the left lower lobe pulmonary nodule is too small to be biopsied, and if the PET scan reports hypermetabolic lesion, I recommended to have stereotactic radiation therapy. Explained to patient that she is not a really good candidate to have thoracotomy for resection because she still has unhealed wound in the abdomen. Stereotactic radiation therapy will likely be  a more feasible choice.   · PET scan examination obtained on 9/18/2020 showed metastatic disease.  It reported a few hypermetabolic pulmonary nodules, and some new small pulmonary nodules, all of them highly suspicious for metastatic disease.  She also has hypermetabolic activity in the abdomen and pelvic area which could be related to scar tissue from her recent surgery versus metastatic disease.  Nevertheless overall picture fits with metastatic cancer.  · Discussed with the patient on 9/20/2020, we reviewed the PET scan images together, and I recommended to have systematic chemotherapy, I do not think stereotactic reading therapy to the hypermetabolic lesions in the lungs warranted at this time because even those will be treated with reading therapy, she will still need systematic chemotherapy.  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  I did discuss with the patient using anti-EGFR monoclonal antibody versus anti-VEGF monoclonal antibody.     · Palliative chemotherapy cycle#1 FOLFIRI was started on 10/13/2020.  No bolus 5-FU given considering previous poor tolerance.    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  Microsatellite stable, mutation burden 5/Mb.   · Discussed with the patient 10/27/2020, because of the K-s[place]ia mutation, she is not a candidate for anti-EGFR monoclonal antibody such as Erbitux or vectibix.  She could be a candidate for anti-VEGF monoclonal antibody, however because of active wound, she is not a candidate right now at this moment.  · Patient seen in the ED for acute right neck pain on 11/16/2020.  CT angiogram as well as CT of the cervical spine performed with no acute findings but notably one of her pulmonary nodules, left upper lobe, somewhat decreased in size.  Patient given prednisone pack.  Further details below.  · Cycle #6 chemotherapy will be resumed on 1/5/2021.  Started back on original irinotecan.  · PET scan  examination obtained on 1/12/2021 after cycle #6 chemotherapy reported improved pulmonary nodule hypermetabolic activity.  Confirmed these are metastatic lesions.  · Patient is evaluated 1/19/2020, will continue cycle #7 FOLFIRI chemotherapy.  However Avastin will be on hold see next section.   · Patient was reevaluated on 2/2/2021, will continue chemotherapy cycle #8 FOLFIRI.  Avastin / biosimilar will be added.    · She talked to [facility] specialist in mid February 2021, and had recommended palliative chemotherapy without surgical intervention of metastatic lung lesions.   · On 3/2/2021, patient will proceed with cycle #10 FOLFIRI plus bevacizumab.  After 12 cycles, plan to start maintenance treatment.  · 3/16/2021 cycle 11.  Plus bevacizumab.  · 3/30/2021 cycle 12 FOLFIRI plus bevacizumab.  Having issues with worsening nausea despite premeds with dexamethasone, Aloxi, Emend, and Zofran at home.  Patient is requesting a dose of Phenergan, which is helped her in the past.  We will give this to her with treatment today.  · PET scan examination on 4/6/2021 reported no evidence of hypermetabolic metastatic lesion.  · Discussed with the patient on 4/13/2021, we reviewed the PET scan results.  We recommended to switch to maintenance chemotherapy without irinotecan.  We will continue 5-FU and Avastin every 2 weeks.  We discussed possibility of switching to oral Xeloda treatment.  Discussed with the patient for side effects more so with hand-foot syndrome.  Patient is agreeable.   · Xeloda 2000 mg twice daily 7 days on, 7 days off along with Avastin initiated 4/27/2021.  · After only 5 doses of Xeloda significant hand-foot syndrome, Xeloda held. Patient requesting to be transitioned back to infusional 5-FU, as she felt that she tolerated infusional 5-FU without any problem.  The patient will receive 5-FU leucovorin and Avastin every 2 weeks.  Xeloda discontinued.  · 5/25/2021 continued cycle  #4 maintenance 5-FU, leucovorin, Avastin tolerating this much better so far, we will continue this. Recommended to have 12 cycles of maintenance chemotherapy, then obtain PET scan for reevaluation.  We could discuss further treatment plan at that time.  · 9/14/2021 patient due for cycle 12 of additional maintenance 5-FU/leucovorin plus Avastin.  She continues to tolerate treatment well overall.  She is anxious in the office today with her Mediport not getting blood at first and also with upcoming scans being heavy on her mind.  She was instructed to take one of her Klonopin pills while here to help calm her mind.  Heart rate did improve from 140s down to 112.  Proceed with treatment today as scheduled.  · PET scan examination on 9/24/2021 reported further shrinking of the right upper lobe tiny pulmonary nodule.  No other new lesions.  · Discussed with patient on 9/24/2021 about further shrinking of the right upper lobe pulmonary nodule and no evidence for other new lesions. We recommended to have chemo break for 3 months with repeated CT scan for reevaluation.  · Recent CT scan for chest 12/14/2021 and abdomen CT from 11/29/2021 reported no evidence for active disease. The right upper lobe pulmonary nodule, left lower lobe pulmonary nodule minimal about 4 mm and stable. I discussed with patient today on 12/21/2021, recommended to give her another 3 months chemotherapy holiday and obtain CT scan afterwards for reevaluation. After discussion, patient is agreeable.   · CT scan examination for chest abdomen and pelvis obtained on 3/15/2022 reported a slight increase of the left upper lobe pulmonary nodule 5 mm, from previous 3 mm.  The other pulmonary nodules were stable in size.  There is also small left upper lobe groundglass changes.   · I reviewed images studies with the patient today on 3/23/2022, compared to multiple previous images including CT chest CT and PET scan, suspected disease progression.  Discussed with  the patient, and I recommended to resume maintenance chemotherapy with 5-FU plus leucovorin plus Avastin repeating every 2 weeks, and obtaining CT scan for reassessment in 3 months.  Patient is agreeable.  · Patient resumed maintenance chemotherapy on 3/29/2022 with 5-FU leucovorin and Avastin.   · 4/26/2022, proceed with cycle #27 maintenance 5-FU, leucovorin, and Avastin.  Patient continues to tolerate treatment well.    · On 5/10/2022, we will proceed ahead with cycle #28 maintenance chemotherapy.  Plan to have CT scan after cycle #30.  · 5/24/2022 cycle 29 maintenance chemotherapy.  Continue to tolerate well.  · 6/7/2022 cycle #30 maintenance chemotherapy, continuing to tolerate well.   · CT scan for chest abdomen pelvis with IV contrast obtained on 6/21/2022 reported sub-6 mm pulmonary nodules either stable or slightly smaller.  We reviewed the images with the patient together.  We discussed with the patient and recommended to hold chemotherapy for now with repeating CT scan in 3 months for reassessment.      2.   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  Low-dose Lovenox discontinued and initiated Xarelto 20 mg daily.  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  · Patient held Xarelto for 2 days prior to MediPort removal from the left chest wall and placement of new port in the right chest wall on 12/18/2020.  She resumed Xarelto that evening.  · Progressive swelling in the neck, face, upper extremities  prompting hospitalization with CT scan showing thrombosis in the right subclavian vein and SVC.  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  Although she had been off of Xarelto for a few days, clearly Xarelto was not prohibiting progression of her thrombosis after she resumed treatment and there was some evidence to suggest thrombosis had been present at least in the innominate vein on prior scan in September but now appears chronic.  Current acute thrombosis involving SVC and right subclavian.  · Patient was admitted and placed on heparin drip  · Patient was evaluated by vascular surgery and intervention was not recommended for thrombolysis/thrombectomy.  · On 12/22/2020, the patient developed worsening shortness of breath, increasing upper extremity edema consistent with worsening SVC syndrome.  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  · Symptoms improved and patient was discharged 12/24/2020 on Lovenox 100 mg every 12 hours.    · Returns 12/29/2020 for evaluation continuing Lovenox, overall tolerating it well.  No bleeding issues.  · Improved edema 1/5/2021.  Will continue Lovenox weight-based every 12 hours.  · On 1/19/2021 and 2/2/2021, patient reports improved facial swelling.  She however does have being bruise on her abdomen wall where she does Lovenox injection.  However she does not have a spontaneous epistaxis, gum bleeding or other bleeding.   · On 2/2/2021, we discussed that side effects of Avastin/biosimilar related to thrombosis.  Since this patient already has been on Lovenox, I think the benefits from treatment for her cancer will outweigh that risk of thrombosis, will proceed ahead with Avastin.  I cautioned patient to watch out for signs of worsening thrombosis patient voiced understanding.   · On  3/16/2021, no evidence of worsening DVT, continue Lovenox.  · 5/11/2021 Lovenox dosing will be adjusted due to patient's weight loss.  We discussed she needs 1 mg/kg.  Her syringes are 100 mg syringes she will do 0.9 mL subcu every 12 hours.  · 8/3/2021 Patient continues to have bruising on abdomen from lovenox injections.  Will need to monitor weight and adjust dosing in future if further weight loss.   · Stable condition on 9/28/2021.  Continue Lovenox anticoagulation.    · Patient reports no chest pain no dyspnea no leg edema. However she had bruising and also most recently abnormal small abscess for which she actually went to ER on 11/29/2021, had I&D. The wound is healing. No further drainage. Denies fever sweating or chills. After discussion, she will continue Lovenox injection for now.   · On 3/23/2022, patient reports good tolerance of Lovenox.  Nevertheless she still has small quantity of pus in the left lower abdomen abscess, she squeezes it every day to prevent it became worse.  She reports no fever sweating chills.  Recommended to start Augmentin 875 mg twice a day for 7 days.  She will continue Lovenox indefinitely.  · On 4/12/2022, patient reports resolution of the small abscess at left lower abdominal wall.   · On 5/10/2022, patient continues on Lovenox indefinitely.  No easy bleeding or bruising.  · 6/23/2022 continuing on Lovenox 1 mg/kg at a dose of 100 mg (patient weight is 99.2 kg today).  Every 12 hours.    3.  This patient also has strong family history for malignancy the patient had appointment with genetic counseling on September 3, 2019.  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    4.  Mild anemia.   · She also has history of iron deficiency.  Iron studies reveal iron saturation of 10%.  She was started on oral iron but was unable to tolerate due to GI side effects.   · Status post Injectafer 2/5/2020 and 2/12/2020   · Improved hemoglobin 13.5 on 1/5/2021.  · 3/16/2020 when hemoglobin now  up to 16.2, hematocrit 47.6.  Patient admits that she has started smoking again, and I have encouraged her to quit.  She denies any snoring or sleep apnea diagnosis.  Patient is not taking oral iron.  We will closely monitor.  · 3/30/2020 hemoglobin 15.7, HCT 47.5.  Patient states that she has cut back significantly on her smoking, although not quit yet.  I have encouraged her to continue working on this.  We will continue to closely monitor.  · Hemoglobin 14.6 on 6/8/2021 at the meantime he has worsening macrocytosis .6.    · Laboratory studies 6/22/2021 reported excellent folate > 20 ng/mL, however low normal B12 level 357 pg/mL.    · On 7/6/2021, normal hemoglobin 15.8, .9 and HCT 47.2%.  Patient was asked to start oral vitamin B12 at 1000 mcg daily.   · 9/14/2021 hemoglobin 17.0, hematocrit 52.1.  Monitor.  · On 9/28/2021 hemoglobin 16.1 hematocrit 48.5%, continue to monitor.   · Lab study on 12/14/2021 reported excellent ferritin 296 and iron saturation 25% with free iron 104 TIBC 424. Hemoglobin was 15.2 with .2.   · Normal hemoglobin 14.6 on 3/15/2022.  Iron study reported normal ferritin 129.5 ng/mL however slightly low iron saturation 11%.  Continue to monitor for now.  · 4/26/2022, hemoglobin 14.8.  · 6/7/2022 hemoglobin 15.5.    5.  Peripheral neuropathy secondary to chemotherapy.   · This is mild involving bilateral hands and feet as reported on 9/16/2020.   · Will avoid chemotherapy with oxaliplatin.  · Stable mild neuropathy as of 11/10/2020  · Patient reports worsening peripheral neuropathy and cycle #5 chemotherapy on 12/8/2020 with and without irinotecan.   · On 1/5/2021, patient reports improvement of peripheral neuropathy, will add irinotecan back to chemotherapy.  Continue to monitor and adjust medication.  · On 3/2/2021, patient reports no worsening of peripheral neuropathy after restarting irinotecan.   · This remains stable, may be slightly better as reported on  3/23/2022..   · No worsening since resuming chemotherapy on 3/29/2022.  · On 6/23/2022 peripheral neuropathy remains stable today.    6.  History of hand-foot syndrome grade 3.    · It become so significant after cycle #7 FOLFOX treatment.  Discussed with patient on 5/13/2020, will discontinue the bolus 5-FU dose, and also decrease 50% of the infusion dose through the pump.   · We also use Medrol Dosepak for possible recurrent symptomology.  She responded this well.   · Her hand-foot syndrome improved.  · Under current treatment with FOLFIRI, patient not receiving 5-FU bolus.  No recurrent issues.   · Patient will be switched to maintenance chemotherapy using Xeloda in late April 2021.  · Following 5 doses of Xeloda, significant hand-foot syndrome with pain in the feet, significant erythema.  Xeloda held.  Will be further discussed with Dr. Nguyen to determine if the patient will resume 5-FU versus a dose reduction to Xeloda.  · Aggressive emollient moisturizer use twice daily for the past week and patient reports improvement in the dryness and erythema.  Xeloda will now be discontinued and patient will switch back to 5-FU.  · As of 5/25/2021 dryness and erythema/hyperpigmentation continues to improve.  Xeloda has been discontinued.  · 6/8/2021, patient reports much improved hand-foot syndrome, with remnant pigmentation on palms.  · On 7/6/2021, patient reports and had a some flareup of hand-foot syndrome, however improved after aggressive moisturizing cream and the urea cream.  Overall much tolerated infusional 5-FU compared to Xeloda.    · 7/20/2021 continues to have mild hyperpigmentation of her hands and feet bilaterally, she continues aggressive moisturization with utter balm with urea.  She also reports some soreness of her tailbone, and we discussed that this is likely due to loss of weight and muscle mass.  I advised her to go ahead and apply moisturizer to this area as there is one tiny fissure.  Also  recommended using a waffle pad or doughnut to sit on.  · 8/3/2021 patient reports her hand foot symptoms are stable and without worsening.  Continue to monitor.  · Symptoms stable, typically flaring about 24 hours after she is unhooked from her 5-FU infusional ball and then settling down with some mild peeling.   · Since off chemotherapy no specific complaints.   · No recurrent symptoms after resuming chemotherapy on 3/29/2022.  · 5/24/2022 does report some mild symptoms about 24 hours after disconnect maintenance chemotherapy.  She is using utterly smooth twice a day.    7.  Hyperlacrimation and mild scleral irritation related to 5-FU.  She used steroid eyedrops.  This has improved her hyperlacrimation.  · Not currently an issue.     8.  Abnormal liver function panel.  · Overall LFTs have improved which is mild ongoing elevation of AST and ALT.  Continue to monitor.    · Slightly worse AST 43 ALT 84 on 9/28/2021.  Continue to monitor.  · Slightly elevated AST 34 ALT 55 on 12/21/2021. Continue to monitor.   · Normal liver function panel on 3/15/2022 and on 4/12/2022. 0.70.   · Normal liver function panel today on 5/10/2022 except ALT 35.  · 6/7/2022 ALT 47 otherwise normal liver function panel.    9.  Depression.  Patient seen by JULIET Davenport on 11/9/2020.  She was started on mirtazapine.  Lexapro was discontinued.  This has definitely improved her mood with the patient feeling overall much better.  She is sleeping better.  Appetite is improved with her actually eating more than she wants to.    · Condition is stable.  She plans to continue follow-up with supportive oncology.      10.  Proteinuria: 3/30/2020: Patient is 2+ protein in her urine.  We will continue with Avastin and closely monitor.  No need for 24-hour urine at this time.  · Persistent 2+ proteinuria on 4/13/2021.  continue to monitor.  · 5/25/2021 protein urine only 1+.    · 6/22/2021 persistent 1+ proteinuria.  Continue to  monitor.  · 7/6/2021, trace protein.  Continue Avastin treatment and monitoring.  · 8/3/2021 1+ protein, stable from last cycle.   · On 8/17/2021, urine protein 2+.  She also had a 1+ leukocytes.  Patient is not symptomatic such as fever, dysuria or gross hematuria, however urine culture obtained, with >100,000 E. Faecalis. Patient treated with Levaquin.   · 6/7/2022 urine negative for protein    11.  Tachycardia:   · Patient was seen by Dr. Bustos cardiologist on 4/6/2021.   · On 9/14/2021 patient more tachycardic in the setting of anxiety.  Improved with Klonopin.  · 4/26/2022 heart rate 88.  · On 5/10/2022 heart rate 99.  · Heart rate 87 on 6/23/2022.    12.  Hypertension.  · /91 at visit 8/17/2021.  She was given clonidine for treatment that day and also prescribed lisinopril 10 mg daily.    · Patient unable to tolerate lisinopril, noting that her blood pressure bottomed out with systolic of barely 80 and feeling very fatigued and wiped out.  She has been checking her blood pressure off the lisinopril with systolics in the 120s to 130s at home and diastolic in the 80s to 90s.   · She remains off antihypertensives.  Blood pressure today 120/94.      PLAN:   1. We recommended to hold maintenance chemotherapy for now.  2. Continue Lovenox at 1 mg/kg twice daily.  3. Continue oral B12 1000 mcg daily.  4. Obtain CT scan for chest abdomen pelvis with IV contrast and oral contrast in 3 months for reassessment of metastatic colon cancer.   5. Obtain laboratory studies CBC CMP CEA level 1 week prior to next MD visit in 3 months.  6. Call/return sooner should she develop any new concerns or problems.        I presently reviewed CT scan images obtained 6/21/2022, and I compared to those images of CT from 3/15/2022.  I shared those images with the patient today.    Discussed with the patient about the above study results and the management plan.  Patient voiced understanding and agreeable.    Dong Nguyen MD  PhD  06/23/22      CC:  WAYNE Worley MD Carrie B. Scharf, MD

## 2022-07-07 DIAGNOSIS — T45.1X5A CHEMOTHERAPY INDUCED DIARRHEA: ICD-10-CM

## 2022-07-07 DIAGNOSIS — K52.1 CHEMOTHERAPY INDUCED DIARRHEA: ICD-10-CM

## 2022-07-07 RX ORDER — DIPHENOXYLATE HYDROCHLORIDE AND ATROPINE SULFATE 2.5; .025 MG/1; MG/1
TABLET ORAL
Qty: 120 TABLET | Refills: 1 | Status: ON HOLD | OUTPATIENT
Start: 2022-07-07 | End: 2022-11-14

## 2022-07-21 ENCOUNTER — INFUSION (OUTPATIENT)
Dept: ONCOLOGY | Facility: HOSPITAL | Age: 42
End: 2022-07-21

## 2022-07-21 DIAGNOSIS — Z45.2 ENCOUNTER FOR FITTING AND ADJUSTMENT OF VASCULAR CATHETER: Primary | ICD-10-CM

## 2022-07-21 PROCEDURE — 96523 IRRIG DRUG DELIVERY DEVICE: CPT

## 2022-07-21 PROCEDURE — 25010000002 HEPARIN LOCK FLUSH PER 10 UNITS: Performed by: INTERNAL MEDICINE

## 2022-07-21 RX ORDER — SODIUM CHLORIDE 0.9 % (FLUSH) 0.9 %
10 SYRINGE (ML) INJECTION AS NEEDED
Status: DISCONTINUED | OUTPATIENT
Start: 2022-07-21 | End: 2022-07-21 | Stop reason: HOSPADM

## 2022-07-21 RX ORDER — HEPARIN SODIUM (PORCINE) LOCK FLUSH IV SOLN 100 UNIT/ML 100 UNIT/ML
500 SOLUTION INTRAVENOUS AS NEEDED
Status: CANCELLED | OUTPATIENT
Start: 2022-07-21

## 2022-07-21 RX ORDER — HEPARIN SODIUM (PORCINE) LOCK FLUSH IV SOLN 100 UNIT/ML 100 UNIT/ML
500 SOLUTION INTRAVENOUS AS NEEDED
Status: DISCONTINUED | OUTPATIENT
Start: 2022-07-21 | End: 2022-07-21 | Stop reason: HOSPADM

## 2022-07-21 RX ORDER — SODIUM CHLORIDE 0.9 % (FLUSH) 0.9 %
10 SYRINGE (ML) INJECTION AS NEEDED
Status: CANCELLED | OUTPATIENT
Start: 2022-07-21

## 2022-07-21 RX ADMIN — Medication 500 UNITS: at 07:28

## 2022-07-21 RX ADMIN — Medication 10 ML: at 07:28

## 2022-07-25 ENCOUNTER — TELEPHONE (OUTPATIENT)
Dept: SURGERY | Facility: CLINIC | Age: 42
End: 2022-07-25

## 2022-07-29 ENCOUNTER — OFFICE VISIT (OUTPATIENT)
Dept: INTERNAL MEDICINE | Facility: CLINIC | Age: 42
End: 2022-07-29

## 2022-07-29 VITALS
BODY MASS INDEX: 37.25 KG/M2 | WEIGHT: 218.2 LBS | HEART RATE: 92 BPM | SYSTOLIC BLOOD PRESSURE: 130 MMHG | TEMPERATURE: 97.8 F | DIASTOLIC BLOOD PRESSURE: 76 MMHG | HEIGHT: 64 IN | OXYGEN SATURATION: 98 %
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
## 2022-07-29 DIAGNOSIS — F33.9 MONOPOLAR DEPRESSION: Primary | Chronic | ICD-10-CM

## 2022-07-29 DIAGNOSIS — F41.9 ANXIETY: Chronic | ICD-10-CM

## 2022-07-29 DIAGNOSIS — C20 ADENOCARCINOMA OF RECTUM: Chronic | ICD-10-CM

## 2022-07-29 PROCEDURE — 99213 OFFICE O/P EST LOW 20 MIN: CPT | Performed by: NURSE PRACTITIONER

## 2022-07-29 RX ORDER — BETAMETHASONE DIPROPIONATE 0.5 MG/G
1 CREAM TOPICAL 2 TIMES DAILY
Qty: 15 G | Refills: 0 | Status: SHIPPED | OUTPATIENT
Start: 2022-07-29 | End: 2022-10-19 | Stop reason: ALTCHOICE

## 2022-07-29 NOTE — PROGRESS NOTES
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  She is now on chemo break and will have repeat scan 3 months.     2) depression/anxiety: chronic and controlled on lexapro.  Klonopin as needed.   States mood is great.     3) continues to smoke outside in the evening - has mosquito bites on arms and legs       I have reviewed patient's medical history, any new submitted information provided by patient or medical assistant and updated medical record.      Objective:      Physical Exam  Constitutional:       Appearance: Normal appearance.   Cardiovascular:      Rate and Rhythm: Normal rate.   Pulmonary:      Effort: Pulmonary effort is normal.   Skin:     Comments: Multiple nodular raised areas to bl arms and legs - some denuded from excoriation.    No drainage - no s/sx infection.    Neurological:      Mental Status: She is alert and oriented to person, place, and time.        Result Review  Data Reviewed:{ Labs  Result Review  Imaging  Med Tab  Media :23}                Vital Signs:   /76 (BP Location: Left arm, Patient Position: Sitting, Cuff Size: Adult)   Pulse 92   Temp 97.8 °F (36.6 °C) (Temporal)   Ht 162.6 cm (64\")   Wt 99 kg (218 lb 3.2 oz)   SpO2 98%   BMI 37.45 kg/m²         Requested Prescriptions     Pending Prescriptions Disp Refills   • betamethasone dipropionate 0.05 % cream 15 g 0     Sig: Apply 1 application topically to the appropriate area as directed 2 (Two) Times a Day.       Routine medications provided by this office will also be refilled via pharmacy request.       Current Outpatient Medications:   •  clonazePAM (KlonoPIN) 1 MG tablet, TAKE 1 " TABLET BY MOUTH 3 TIMES A DAY AS NEEDED FOR ANXIETY OR SEIZURES, Disp: 90 tablet, Rfl: 1  •  Cyanocobalamin (VITAMIN B-12 PO), Take  by mouth., Disp: , Rfl:   •  dicyclomine (BENTYL) 20 MG tablet, TAKE 1 TABLET BY MOUTH EVERY 6 HOURS AS NEEDED, Disp: 360 tablet, Rfl: 0  •  diphenoxylate-atropine (LOMOTIL) 2.5-0.025 MG per tablet, TAKE 1 TABLET BY MOUTH 4 TIMES A DAY AS NEEDED FOR DIARRHEA., Disp: 120 tablet, Rfl: 1  •  Enoxaparin Sodium (LOVENOX) 100 MG/ML solution prefilled syringe syringe, Inject 1 mL under the skin into the appropriate area as directed Every 12 (Twelve) Hours., Disp: 60 mL, Rfl: 2  •  escitalopram (LEXAPRO) 20 MG tablet, TAKE 1 TABLET BY MOUTH EVERY DAY, Disp: 90 tablet, Rfl: 3  •  famotidine (PEPCID) 20 MG tablet, TAKE 1 TABLET BY MOUTH TWICE A DAY, Disp: 180 tablet, Rfl: 1  •  fluorouracil in dextrose 5 % 250 mL infusion, Infuse  into a venous catheter 1 (One) Time. Takes twice a month., Disp: , Rfl:   •  Loratadine 10 MG capsule, Take 10 mg by mouth Every Evening., Disp: , Rfl:   •  metoprolol succinate XL (TOPROL-XL) 25 MG 24 hr tablet, Take 0.5 tablets by mouth Every Night., Disp: 45 tablet, Rfl: 3  •  mineral oil-hydrophilic petrolatum (AQUAPHOR) ointment, Apply 1 application topically to the appropriate area as directed As Needed for Dry Skin., Disp: , Rfl:   •  ondansetron (ZOFRAN) 8 MG tablet, Take 1 tablet by mouth 3 (Three) Times a Day As Needed for Nausea or Vomiting., Disp: 60 tablet, Rfl: 1  •  promethazine (PHENERGAN) 25 MG tablet, Take 1 tablet by mouth Every 6 (Six) Hours As Needed for Nausea or Vomiting., Disp: 60 tablet, Rfl: 2     Assessment and Plan:      Assessment and Plan {CC Problem List  Visit Diagnosis  ROS  Review (Popup)  Health Maintenance  Quality  BestPractice  Medications  SmartSets  SnapShot Encounters  Media :23}     Problem List Items Addressed This Visit        Hematology and Neoplasia    Adenocarcinoma of rectum (HCC) (Chronic)       Mental Health     Anxiety (Chronic)    Monopolar depression (HCC) - Primary (Chronic)    Current Assessment & Plan     Patient's depression is recurrent and is mild without psychosis. Their depression is currently in full remission and the condition is improving with treatment. This will be reassessed at the next regular appointment. F/U as described:patient will continue current medication therapy.           Other Visit Diagnoses     Mosquito bite, initial encounter        Discussed measures to prevent bites - will give her topical steroid for pruritits.         Continue follow up with oncology.     Follow Up {Instructions Charge Capture  Follow-up Communications :23}     Return in about 3 months (around 10/29/2022).      Patient was given instructions and counseling regarding her condition or for health maintenance advice. Please see specific information pulled into the AVS if appropriate.    Dragon disclaimer:   Much of this encounter note is an electronic transcription/translation of spoken language to printed text. The electronic translation of spoken language may permit erroneous, or at times, nonsensical words or phrases to be inadvertently transcribed; Although I have reviewed the note for such errors, some may still exist.     Additional Patient Counseling:       There are no Patient Instructions on file for this visit.

## 2022-08-12 ENCOUNTER — TELEPHONE (OUTPATIENT)
Dept: ONCOLOGY | Facility: CLINIC | Age: 42
End: 2022-08-12

## 2022-08-12 NOTE — TELEPHONE ENCOUNTER
Patient is on Lovenox, she wants to know if she can stop the medication for Botox, Lip fillers and tattoos.  Please advise.

## 2022-08-12 NOTE — TELEPHONE ENCOUNTER
Patient wants to ask questions about stopping blood thinner for get Botox, lip fillers, and tattoos

## 2022-08-12 NOTE — TELEPHONE ENCOUNTER
Reviewed with Dr. Nguyen and notified patient that she should hold her Lovenox for 24 hours prior to the procedure and then for 24-36 hours following the procedure.  She v/u.

## 2022-08-12 NOTE — TELEPHONE ENCOUNTER
Returned call to patient to let her know that Dr. Nguyen is rounding today and we will not be able to get back with her till the end of the day.  They have availability to do the procedures on Tuesday and she just needs to know when to stop the Lovenox.  She v/u.

## 2022-08-17 ENCOUNTER — DOCUMENTATION (OUTPATIENT)
Dept: SURGERY | Facility: CLINIC | Age: 42
End: 2022-08-17

## 2022-08-17 NOTE — PROGRESS NOTES
1 year CY, last Flexible Sigmoidoscopy done 07/22/2021.     08/18/2022, CY recall letter mailed to patient, [facility]ees[facility] Medical Assistant.

## 2022-08-18 ENCOUNTER — INFUSION (OUTPATIENT)
Dept: ONCOLOGY | Facility: HOSPITAL | Age: 42
End: 2022-08-18

## 2022-08-18 DIAGNOSIS — C20 ADENOCARCINOMA OF RECTUM: ICD-10-CM

## 2022-08-18 DIAGNOSIS — Z45.2 ENCOUNTER FOR FITTING AND ADJUSTMENT OF VASCULAR CATHETER: Primary | ICD-10-CM

## 2022-08-18 PROCEDURE — 25010000002 HEPARIN LOCK FLUSH PER 10 UNITS: Performed by: INTERNAL MEDICINE

## 2022-08-18 PROCEDURE — 96523 IRRIG DRUG DELIVERY DEVICE: CPT

## 2022-08-18 RX ORDER — SODIUM CHLORIDE 0.9 % (FLUSH) 0.9 %
10 SYRINGE (ML) INJECTION AS NEEDED
Status: CANCELLED | OUTPATIENT
Start: 2022-08-18

## 2022-08-18 RX ORDER — SODIUM CHLORIDE 0.9 % (FLUSH) 0.9 %
10 SYRINGE (ML) INJECTION AS NEEDED
Status: DISCONTINUED | OUTPATIENT
Start: 2022-08-18 | End: 2022-08-18 | Stop reason: HOSPADM

## 2022-08-18 RX ORDER — HEPARIN SODIUM (PORCINE) LOCK FLUSH IV SOLN 100 UNIT/ML 100 UNIT/ML
500 SOLUTION INTRAVENOUS AS NEEDED
Status: CANCELLED | OUTPATIENT
Start: 2022-08-18

## 2022-08-18 RX ORDER — HEPARIN SODIUM (PORCINE) LOCK FLUSH IV SOLN 100 UNIT/ML 100 UNIT/ML
500 SOLUTION INTRAVENOUS AS NEEDED
Status: DISCONTINUED | OUTPATIENT
Start: 2022-08-18 | End: 2022-08-18 | Stop reason: HOSPADM

## 2022-08-18 RX ADMIN — Medication 500 UNITS: at 07:45

## 2022-08-18 RX ADMIN — Medication 10 ML: at 07:45

## 2022-08-22 DIAGNOSIS — F41.9 ANXIETY: ICD-10-CM

## 2022-08-24 RX ORDER — CLONAZEPAM 1 MG/1
TABLET ORAL
Qty: 90 TABLET | Refills: 1 | Status: SHIPPED | OUTPATIENT
Start: 2022-08-24 | End: 2022-12-29

## 2022-09-09 ENCOUNTER — PREP FOR SURGERY (OUTPATIENT)
Dept: OTHER | Facility: HOSPITAL | Age: 42
End: 2022-09-09

## 2022-09-09 DIAGNOSIS — Z85.048 HISTORY OF RECTAL CANCER: Primary | ICD-10-CM

## 2022-09-09 DIAGNOSIS — Z80.0 FAMILY HISTORY OF COLON CANCER: ICD-10-CM

## 2022-09-13 ENCOUNTER — HOSPITAL ENCOUNTER (OUTPATIENT)
Dept: CT IMAGING | Facility: HOSPITAL | Age: 42
Discharge: HOME | End: 2022-09-13
Admitting: INTERNAL MEDICINE

## 2022-09-13 DIAGNOSIS — C20 ADENOCARCINOMA OF RECTUM: ICD-10-CM

## 2022-09-13 DIAGNOSIS — C78.02 MALIGNANT NEOPLASM METASTATIC TO BOTH LUNGS: ICD-10-CM

## 2022-09-13 DIAGNOSIS — C78.01 MALIGNANT NEOPLASM METASTATIC TO BOTH LUNGS: ICD-10-CM

## 2022-09-13 PROCEDURE — 74177 CT ABD & PELVIS W/CONTRAST: CPT

## 2022-09-13 PROCEDURE — 25010000002 IOPAMIDOL 61 % SOLUTION: Performed by: INTERNAL MEDICINE

## 2022-09-13 PROCEDURE — 71260 CT THORAX DX C+: CPT

## 2022-09-13 PROCEDURE — 82565 ASSAY OF CREATININE: CPT

## 2022-09-13 PROCEDURE — 0 DIATRIZOATE MEGLUMINE & SODIUM PER 1 ML: Performed by: INTERNAL MEDICINE

## 2022-09-13 RX ADMIN — IOPAMIDOL 85 ML: 612 INJECTION, SOLUTION INTRAVENOUS at 10:58

## 2022-09-13 RX ADMIN — DIATRIZOATE MEGLUMINE AND DIATRIZOATE SODIUM 30 ML: 660; 100 LIQUID ORAL; RECTAL at 10:58

## 2022-09-20 ENCOUNTER — OFFICE VISIT (OUTPATIENT)
Dept: ONCOLOGY | Facility: CLINIC | Age: 42
End: 2022-09-20

## 2022-09-20 ENCOUNTER — INFUSION (OUTPATIENT)
Dept: ONCOLOGY | Facility: HOSPITAL | Age: 42
End: 2022-09-20

## 2022-09-20 VITALS
BODY MASS INDEX: 36.35 KG/M2 | HEIGHT: 64 IN | OXYGEN SATURATION: 98 % | WEIGHT: 212.9 LBS | HEART RATE: 81 BPM | RESPIRATION RATE: 16 BRPM | TEMPERATURE: 96.9 F | SYSTOLIC BLOOD PRESSURE: 122 MMHG | DIASTOLIC BLOOD PRESSURE: 85 MMHG

## 2022-09-20 DIAGNOSIS — Z45.2 ENCOUNTER FOR FITTING AND ADJUSTMENT OF VASCULAR CATHETER: Primary | ICD-10-CM

## 2022-09-20 DIAGNOSIS — C78.02 MALIGNANT NEOPLASM METASTATIC TO BOTH LUNGS: ICD-10-CM

## 2022-09-20 DIAGNOSIS — C78.01 MALIGNANT NEOPLASM METASTATIC TO BOTH LUNGS: ICD-10-CM

## 2022-09-20 DIAGNOSIS — C20 ADENOCARCINOMA OF RECTUM: Primary | ICD-10-CM

## 2022-09-20 DIAGNOSIS — I26.99 PULMONARY EMBOLISM WITHOUT ACUTE COR PULMONALE, UNSPECIFIED CHRONICITY, UNSPECIFIED PULMONARY EMBOLISM TYPE: ICD-10-CM

## 2022-09-20 DIAGNOSIS — C20 ADENOCARCINOMA OF RECTUM: ICD-10-CM

## 2022-09-20 DIAGNOSIS — R91.1 PULMONARY NODULE, RIGHT: ICD-10-CM

## 2022-09-20 LAB
ALBUMIN SERPL-MCNC: 3.8 G/DL (ref 3.5–5.2)
ALBUMIN/GLOB SERPL: 1.2 G/DL (ref 1.1–2.4)
ALP SERPL-CCNC: 81 U/L (ref 38–116)
ALT SERPL W P-5'-P-CCNC: 25 U/L (ref 0–33)
ANION GAP SERPL CALCULATED.3IONS-SCNC: 11.3 MMOL/L (ref 5–15)
AST SERPL-CCNC: 17 U/L (ref 0–32)
BASOPHILS # BLD AUTO: 0.02 10*3/MM3 (ref 0–0.2)
BASOPHILS NFR BLD AUTO: 0.3 % (ref 0–1.5)
BILIRUB SERPL-MCNC: 0.2 MG/DL (ref 0.2–1.2)
BUN SERPL-MCNC: 12 MG/DL (ref 6–20)
BUN/CREAT SERPL: 16.2 (ref 7.3–30)
CALCIUM SPEC-SCNC: 9.4 MG/DL (ref 8.5–10.2)
CEA SERPL-MCNC: 1.12 NG/ML
CHLORIDE SERPL-SCNC: 105 MMOL/L (ref 98–107)
CO2 SERPL-SCNC: 21.7 MMOL/L (ref 22–29)
CREAT BLDA-MCNC: 0.8 MG/DL (ref 0.6–1.3)
CREAT SERPL-MCNC: 0.74 MG/DL (ref 0.6–1.1)
DEPRECATED RDW RBC AUTO: 49.5 FL (ref 37–54)
EGFRCR SERPLBLD CKD-EPI 2021: 103.1 ML/MIN/1.73
EOSINOPHIL # BLD AUTO: 0.5 10*3/MM3 (ref 0–0.4)
EOSINOPHIL NFR BLD AUTO: 7.4 % (ref 0.3–6.2)
ERYTHROCYTE [DISTWIDTH] IN BLOOD BY AUTOMATED COUNT: 13.2 % (ref 12.3–15.4)
GLOBULIN UR ELPH-MCNC: 3.1 GM/DL (ref 1.8–3.5)
GLUCOSE SERPL-MCNC: 99 MG/DL (ref 74–124)
HCT VFR BLD AUTO: 49 % (ref 34–46.6)
HGB BLD-MCNC: 15.7 G/DL (ref 12–15.9)
IMM GRANULOCYTES # BLD AUTO: 0.01 10*3/MM3 (ref 0–0.05)
IMM GRANULOCYTES NFR BLD AUTO: 0.1 % (ref 0–0.5)
LYMPHOCYTES # BLD AUTO: 1.2 10*3/MM3 (ref 0.7–3.1)
LYMPHOCYTES NFR BLD AUTO: 17.7 % (ref 19.6–45.3)
MCH RBC QN AUTO: 32.7 PG (ref 26.6–33)
MCHC RBC AUTO-ENTMCNC: 32 G/DL (ref 31.5–35.7)
MCV RBC AUTO: 102.1 FL (ref 79–97)
MONOCYTES # BLD AUTO: 0.47 10*3/MM3 (ref 0.1–0.9)
MONOCYTES NFR BLD AUTO: 6.9 % (ref 5–12)
NEUTROPHILS NFR BLD AUTO: 4.57 10*3/MM3 (ref 1.7–7)
NEUTROPHILS NFR BLD AUTO: 67.6 % (ref 42.7–76)
NRBC BLD AUTO-RTO: 0 /100 WBC (ref 0–0.2)
PLATELET # BLD AUTO: 212 10*3/MM3 (ref 140–450)
PMV BLD AUTO: 9.1 FL (ref 6–12)
POTASSIUM SERPL-SCNC: 4.6 MMOL/L (ref 3.5–4.7)
PROT SERPL-MCNC: 6.9 G/DL (ref 6.3–8)
RBC # BLD AUTO: 4.8 10*6/MM3 (ref 3.77–5.28)
SODIUM SERPL-SCNC: 138 MMOL/L (ref 134–145)
WBC NRBC COR # BLD: 6.77 10*3/MM3 (ref 3.4–10.8)

## 2022-09-20 PROCEDURE — 80053 COMPREHEN METABOLIC PANEL: CPT

## 2022-09-20 PROCEDURE — 82378 CARCINOEMBRYONIC ANTIGEN: CPT | Performed by: INTERNAL MEDICINE

## 2022-09-20 PROCEDURE — 36591 DRAW BLOOD OFF VENOUS DEVICE: CPT

## 2022-09-20 PROCEDURE — 25010000002 HEPARIN LOCK FLUSH PER 10 UNITS: Performed by: INTERNAL MEDICINE

## 2022-09-20 PROCEDURE — 99214 OFFICE O/P EST MOD 30 MIN: CPT | Performed by: INTERNAL MEDICINE

## 2022-09-20 PROCEDURE — 85025 COMPLETE CBC W/AUTO DIFF WBC: CPT

## 2022-09-20 RX ORDER — HEPARIN SODIUM (PORCINE) LOCK FLUSH IV SOLN 100 UNIT/ML 100 UNIT/ML
500 SOLUTION INTRAVENOUS AS NEEDED
Status: CANCELLED | OUTPATIENT
Start: 2022-09-20

## 2022-09-20 RX ORDER — SODIUM CHLORIDE 0.9 % (FLUSH) 0.9 %
10 SYRINGE (ML) INJECTION AS NEEDED
Status: CANCELLED | OUTPATIENT
Start: 2022-09-20

## 2022-09-20 RX ORDER — HEPARIN SODIUM (PORCINE) LOCK FLUSH IV SOLN 100 UNIT/ML 100 UNIT/ML
500 SOLUTION INTRAVENOUS AS NEEDED
Status: DISCONTINUED | OUTPATIENT
Start: 2022-09-20 | End: 2022-09-20 | Stop reason: HOSPADM

## 2022-09-20 RX ORDER — SODIUM CHLORIDE 0.9 % (FLUSH) 0.9 %
10 SYRINGE (ML) INJECTION AS NEEDED
Status: DISCONTINUED | OUTPATIENT
Start: 2022-09-20 | End: 2022-09-20 | Stop reason: HOSPADM

## 2022-09-20 RX ADMIN — SODIUM CHLORIDE, PRESERVATIVE FREE 500 UNITS: 5 INJECTION INTRAVENOUS at 09:00

## 2022-09-20 RX ADMIN — Medication 10 ML: at 09:00

## 2022-09-30 ENCOUNTER — PREP FOR SURGERY (OUTPATIENT)
Dept: OTHER | Facility: HOSPITAL | Age: 42
End: 2022-09-30

## 2022-10-03 ENCOUNTER — OFFICE VISIT (OUTPATIENT)
Dept: SURGERY | Facility: CLINIC | Age: 42
End: 2022-10-03

## 2022-10-03 VITALS
WEIGHT: 213.6 LBS | HEIGHT: 66 IN | SYSTOLIC BLOOD PRESSURE: 118 MMHG | TEMPERATURE: 97.8 F | HEART RATE: 91 BPM | OXYGEN SATURATION: 97 % | BODY MASS INDEX: 34.33 KG/M2 | DIASTOLIC BLOOD PRESSURE: 68 MMHG

## 2022-10-03 DIAGNOSIS — Z85.048 HISTORY OF RECTAL CANCER: Primary | ICD-10-CM

## 2022-10-03 PROCEDURE — 99213 OFFICE O/P EST LOW 20 MIN: CPT | Performed by: COLON & RECTAL SURGERY

## 2022-10-03 RX ORDER — CEFAZOLIN SODIUM 2 G/100ML
2 INJECTION, SOLUTION INTRAVENOUS ONCE
Status: CANCELLED | OUTPATIENT
Start: 2022-11-14 | End: 2022-10-03

## 2022-10-03 RX ORDER — SCOLOPAMINE TRANSDERMAL SYSTEM 1 MG/1
1 PATCH, EXTENDED RELEASE TRANSDERMAL CONTINUOUS
Status: CANCELLED | OUTPATIENT
Start: 2022-11-14 | End: 2022-11-17

## 2022-10-03 RX ORDER — CELECOXIB 200 MG/1
200 CAPSULE ORAL ONCE
Status: CANCELLED | OUTPATIENT
Start: 2022-11-14 | End: 2022-10-03

## 2022-10-03 RX ORDER — SODIUM CHLORIDE, SODIUM LACTATE, POTASSIUM CHLORIDE, CALCIUM CHLORIDE 600; 310; 30; 20 MG/100ML; MG/100ML; MG/100ML; MG/100ML
30 INJECTION, SOLUTION INTRAVENOUS CONTINUOUS
Status: CANCELLED | OUTPATIENT
Start: 2022-10-03

## 2022-10-03 RX ORDER — METRONIDAZOLE 500 MG/100ML
500 INJECTION, SOLUTION INTRAVENOUS ONCE
Status: CANCELLED | OUTPATIENT
Start: 2022-11-14 | End: 2022-10-03

## 2022-10-03 RX ORDER — CHLORHEXIDINE GLUCONATE 4 G/100ML
SOLUTION TOPICAL 2 TIMES DAILY
Qty: 236 ML | Refills: 0 | Status: SHIPPED | OUTPATIENT
Start: 2022-10-03 | End: 2022-10-19 | Stop reason: ALTCHOICE

## 2022-10-03 RX ORDER — GABAPENTIN 300 MG/1
600 CAPSULE ORAL ONCE
Status: CANCELLED | OUTPATIENT
Start: 2022-11-14 | End: 2022-10-03

## 2022-10-03 RX ORDER — ALVIMOPAN 12 MG/1
12 CAPSULE ORAL ONCE
Status: CANCELLED | OUTPATIENT
Start: 2022-11-14 | End: 2022-10-03

## 2022-10-03 RX ORDER — ACETAMINOPHEN 500 MG
1000 TABLET ORAL ONCE
Status: CANCELLED | OUTPATIENT
Start: 2022-11-14 | End: 2022-10-03

## 2022-10-03 NOTE — PROGRESS NOTES
"Carole Weaver is a 42 y.o. female in for follow up of history of rectal and anal cancer .    Carole Weaver is a 42-year-old female who presents today for a follow up.     Patient has been doing well since her last visit. She is scheduled for a colonoscopy on 11/03/2022. Her last flexible sigmoid colon cancer screening was done in 07/2021 which was normal. She is scheduled for a repeat flexible sigmoid colon cancer screening in 11/2022. She is contemplating her ileostomy reversal at this point. Patient is concerned about the recovery time after the surgery. She is wondering if she will have to wear a diaper for the rest of her life. The patient is also wondering if she is at a higher rate of developing a fistula. Patient has a history of blood clots. She is currently on Lovenox 2 injections daily.     /68 (BP Location: Left arm, Patient Position: Sitting, Cuff Size: Large Adult)   Pulse 91   Temp 97.8 °F (36.6 °C)   Ht 167.6 cm (66\")   Wt 96.9 kg (213 lb 9.6 oz)   SpO2 97%   Breastfeeding No   BMI 34.48 kg/m²   Body mass index is 34.48 kg/m².    Results:  Last CAT scan was done 9/13/2022 stable pulmonary nodules.    PE:  Physical Exam  Constitutional:       General: She is not in acute distress.     Appearance: She is well-developed.   HENT:      Head: Normocephalic and atraumatic.   Abdominal:      General: There is no distension.      Palpations: Abdomen is soft.   Musculoskeletal:         General: Normal range of motion.   Neurological:      Mental Status: She is alert.   Psychiatric:         Thought Content: Thought content normal.           Assessment:   1. History of rectal cancer        Plan:  -I discussed with the patient that as far as surgery, it will not hinder recovery, she may have a bit more urgency. I explained that it may take a few months for the body to figure it out, and then for people who it takes a little bit longer, there is physical therapy to help retrain the colon and rectum. I " explained that the radiation does decrease the compliance or the ability for that tissue to stretch. The patient has concerns about the reversal not working out, would she be able to go back to the bag.  I explained that we would place a colostomy instead of an ileostomy.  A colostomy is a lot more formed and she would only be emptying it maybe once or twice a day.  We would confirm that there are no other polyps or anything else going on. I explained that there can be some irritation in the colon because it does not have stool and is what keeps the colon healthy.  I explained that we would go right around the ileostomy and pull it up, staple and stitch it back together, we place it in back to the abdomen, we close the muscle over it. The skin has had stool going by it for the past few years, we tend to not close the skin, we will put a wound VAC on top of that, and it is a suction device that sucks the fluid and makes the tissue heal up a lot faster. I explained that most people are in the hospital 2 to 4 days.  I explained that she will need to be off of Lovenox the day before and then we would start them the day after. I explained that she will have to stay on Lovenox for the colonoscopy. She is scheduled for a colonoscopy on 11/03/2022.   -I described with patient typical surgical time, postop recovery including pain management, and restrictions. I discussed with patient risks, benefits, and alternatives.  The patient had opportunity to ask questions.  I answered all questions.  Patient understands and wishes to proceed with procedure.    Transcribed from ambient dictation for Padmaja Ye MD by Priti Simpson.  10/03/22   10:35 EDT    Patient verbalized consent to the visit recording.    This patient was evaluated by me, recommendations made, documentation reviewed, edited, and revised by me, Padmaja Ye MD

## 2022-10-05 PROBLEM — Z80.0 FAMILY HISTORY OF COLON CANCER: Status: ACTIVE | Noted: 2022-10-05

## 2022-10-19 ENCOUNTER — OFFICE VISIT (OUTPATIENT)
Dept: CARDIOLOGY | Facility: CLINIC | Age: 42
End: 2022-10-19

## 2022-10-19 VITALS
WEIGHT: 217 LBS | BODY MASS INDEX: 34.87 KG/M2 | SYSTOLIC BLOOD PRESSURE: 100 MMHG | DIASTOLIC BLOOD PRESSURE: 78 MMHG | HEIGHT: 66 IN | HEART RATE: 94 BPM

## 2022-10-19 DIAGNOSIS — I25.84 CORONARY ARTERY CALCIFICATION: ICD-10-CM

## 2022-10-19 DIAGNOSIS — C20 ADENOCARCINOMA OF RECTUM: Chronic | ICD-10-CM

## 2022-10-19 DIAGNOSIS — D68.51 HETEROZYGOUS FACTOR V LEIDEN MUTATION: Chronic | ICD-10-CM

## 2022-10-19 DIAGNOSIS — I25.10 CORONARY ARTERY CALCIFICATION: ICD-10-CM

## 2022-10-19 DIAGNOSIS — Z79.01 ON ANTICOAGULANT THERAPY: ICD-10-CM

## 2022-10-19 DIAGNOSIS — I10 PRIMARY HYPERTENSION: Primary | Chronic | ICD-10-CM

## 2022-10-19 PROCEDURE — 99214 OFFICE O/P EST MOD 30 MIN: CPT | Performed by: INTERNAL MEDICINE

## 2022-10-19 PROCEDURE — 93000 ELECTROCARDIOGRAM COMPLETE: CPT | Performed by: INTERNAL MEDICINE

## 2022-10-19 NOTE — PROGRESS NOTES
Date of Office Visit: 10/19/2022  Encounter Provider: Tasha Bustos MD  Place of Service: [facility] CARDIOLOGY  Patient Name: Carole Weaver  :1980      Patient ID:  Carole Weaver is a 42 y.o. female is here for  followup for cardio oncology         History of Present Illness    She has a history of primary rectal cancer which was surgically resected by Dr. Ольга Ye 2019.  She was started on FOLFOX 6 (leucovorin, fluorouracil and oxaliplatin) 2020 followed by Dr Nguyen.  Follow-up PET scan 2020 showed no metastatic disease in the chest abdomen and pelvis but a stable 6 mm right upper lung pulmonary nodule.  She had a low anterior colon resection with coloanal anastomosis done 8/3/2020.  On 2020, there is a new 8 mm noncalcified pulmonary nodule in the left lower lobe of the lung.  The 6 mm prior pulmonary nodule stable.  PET scan on 2020 showed multiple hypermetabolic small pulmonary nodules.  She was started on pelvic chemotherapy with FOLFIRI (leucovorin, fluorouracil and irinotecan) on 10/13/2020.  She developed peripheral neuropathy and so irinotecan was stopped but then resumed.  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  She had telemedicine visit with [facility] 2021.  They evaluated her chemotherapeutic plan and agreed with treatment for palliative chemotherapy followed by maintenance chemotherapy.  She was on FOLFIRI and bevacizumab.      She is heterozygous for factor V Leiden and has been on prophylactic anticoagulation with Lovenox.  She had 3 pulmonary emboli diagnosed 2019 at that time she was on low-dose Lovenox.  She was then placed on Xarelto which was then made subtherapeutic as they lowered the dose.  She then developed SVC and left brachiocephalic thrombus and was switched to weight-based  Lovenox every 12 hours.     I did review a CTA from 12/2020.  There is no coronary artery calcification.     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  She is smoking 1 ppd cigarettes a day uses no alcohol or drugs and has rare caffeine.     Her father has atrial fibrillation, mother has hypertension.  There is no family history of premature cancer.     Echo done 11/11/2021 shows ejection 51-55% with global longitudinal strain -21.7%, grade 1 diastolic dysfunction and mild concentric left ventricular hypertrophy.  This is no change from her baseline echocardiogram done April 2021. She saw Dr. Nguyen, oncology on 9/28/2021.  At that visit, Dr. Nguyen recommended a 3-month chemotherapy break.  Prior to this she had been on 5-fluorouracil/leucovorin plus avastin.     Echo done 5/11/2022 showed ejection fraction greater than 70%, normal global longitudinal strain, no valvular abnormalities and normal diastolic function.  She saw Dr. Nguyen on 5/10/2022.  She had a CT scan on 3/15/2022 showing slight progression of small pulmonary nodule so she was started back on maintenance chemotherapy on 3/20/2022 with 5-fluorouracil, leucovorin and Avastin.  She will be getting 6 treatments and has already gotten 4 with tumor remaining.  She has gained fluid because of the chemo which is typical for her.  She has chronic diarrhea because of her ileostomy.      Labs in 9/20/2022 show normal CMP and CBC.  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  I did review the CT images of the chest to look at the coronary arteries.  This shows patent normal left main, calcification noted of the mid LAD, patent proximal RCA, patent proximal circumflex,  no pericardial effusion.    She is post to have her ileostomy reversed 11/14/2022 with Dr. Esparza.  Right now, her scans show no cancer on 2 consecutive scans.  She has no orthopnea or PND.  She has no exertional dyspnea.  She has no chest pain or pressure.  She does not feel heart racing or skipping.  She is taking her medications as directed without difficulty.  She feels like she is getting back into life.    Past Medical History:   Diagnosis Date   • Abdominopelvic abscess (HCC) 08/12/2020    ADMITTED TO [facility]   • Abnormal Pap smear of cervix 02/02/1998    JULIUS I   • Anemia in pregnancy    • Anemia in pregnancy    • Anxiety    • Bilateral epiphora 04/2020   • Bilateral hand swelling 05/02/2020    SEEN AT [facility] ER   • Cervical lymphadenitis 06/06/2012    SEEN AT [facility] ER   • Chemotherapy induced diarrhea 01/2021   • Chemotherapy induced neutropenia (HCC) 04/2020   • Chemotherapy-induced nausea 02/2020   • Chemotherapy-induced thrombocytopenia 05/2020   • Chronic diarrhea    • Colon polyps     FOLLOWED BY DR. GERONIMO ESPARZA   • Cystitis 04/24/2020    WITH DEHYDRATION, ADMITTED TO [facility]   • Cystitis 10/27/2020   • Depression    • Drug-induced peripheral neuropathy ([facility]) 05/2020   • Factor V Leiden mutation ([facility])    • Fistula of intestine    • GERD (gastroesophageal reflux disease)    • Hand foot syndrome 05/2020   • Heart murmur     IN CHILDHOOD   • Hematochezia    • Hemorrhoids    • Heterozygous factor V Leiden mutation ([facility])     DX 8-2-2019   • History of anemia    • History of chemotherapy 2019    FOLLOWED BY DR. ALEXANDRU HUNT   • History of gestational diabetes    • History of pre-eclampsia 1998   • History of radiation therapy 2019    FOLLOWED BY DR. JAVON LEWIS   • History of TB skin testing 01/17/2009    TB Skin Test   • HPV in female 1998   • Hypokalemia 09/2019   • Hypomagnesemia 09/2019   • Hyponatremia 06/2021   • IBS (irritable bowel syndrome)    • Ileostomy in place ([facility])     FOLLOWED BY DR. GERONIMO ESPARZA   •  Infected insect bite of neck 2016    SEEN AT [facility]   • Kidney stones 2007    SEEN AT [facility] ER   • Lump of right breast     SWOLLEN LYMPH NODE   • Lung cancer (HCC) 2020    METASTATIC LUNG CANCER   • Macrocytosis 2021   • Monopolar depression (HCC)    • On anticoagulant therapy    • Pain associated with surgical procedure 2020   • Palmar-plantar erythrodysesthesia 2021   • Perirectal abscess 2020   • Pulmonary embolism without acute cor pulmonale (HCC) 09/20/2019    X 3; ADMITTED TO [facility]   • Pulmonary nodule, right 2020   • Rectal cancer (HCC) 2019    STAGE IIA, INVASIVE MODERATELY DIFFERENTIATED ADENOCARCINOMA, CHEMO AND XRT FINISHED 2019   • Right shoulder pain 2020    SEEN AT [facility] ER   • Right ureteral stone 10/01/2019    SEEN AT [facility] ER   • SAB (spontaneous ) 1996     A2-1 INDUCED   • Sciatica    • Sepsis due to cellulitis (HCC) 2002    LEFT GREAT TOE, ADMITTED TO [facility]   • Tachycardia 2020   • Thrombosis of superior vena cava ([facility]) 2020    AND BRACHIOCEPHALIC VEIN, ADMITTED TO [facility]   • Urinary urgency 2020         Past Surgical History:   Procedure Laterality Date   • ABDOMINAL SURGERY     • CHOLECYSTECTOMY N/A 10/10/2003    LAPAROSCOPIC WITH CHOLANGIOGRAM, DR. JAMEY TALAVERA AT [facility]   • COLON RESECTION N/A 2019    Procedure: laparoscopic low anterior colon resection with mobilization of splenic flexure and diverting loop ileostomy: ERAS;  Surgeon: Padmaja Esparza MD;  Location: [facility] OR;  Service: General   • COLON RESECTION N/A 8/3/2020    Procedure: LOW ANTERIOR COLON RESECTION, COLOANAL ANASTOMOSIS, MOBILIZATION SPLENIC FLEXURE;  Surgeon: Padmaja Esparza MD;  Location: [facility] OR;  Service: General;  Laterality: N/A;   • COLONOSCOPY N/A 7/15/2020    PATENT ANASTAMOSIS IN MID RECTUM, RESCOPE IN 1 YR, DR. PADMAJA ESPARZA AT [facility]   • COLONOSCOPY W/ POLYPECTOMY N/A 2019    15 MM TUBULOVILLOUS ADENOMA POLYP  IN HEPATIC FLEXURE, 20 MMTUBULOVILLOUS ADENOMA WITH HIGH GRADE DYSPLASIA IN RECTOSIGMOID, 4 CM MASS IN MID RECTUM, PATH: INVASIVE MODERATELY DIFFERENTIATED ADENOCARCINOMA, DR. JENNIFER LI AT [facility]   • DILATATION AND EVACUATION N/A 2009   • ENDOSCOPY N/A 07/08/2019    LA GRADE A ESOPHAGITIS, GASTRITIS, ALL BIOPSIES BENIGN, DR. JENNIFER LI AT [facility]   • INCISION AND DRAINAGE PERIRECTAL ABSCESS N/A 8/14/2020    Procedure: INCISION AND DRAINAGE OF retrorectal dehiscence abcess with drain placement and irrigation;  Surgeon: Geronimo Ye MD;  Location: [facility] OR;  Service: General;  Laterality: N/A;   • PAP SMEAR N/A 01/24/2014   • SIGMOIDOSCOPY N/A 7/24/2019    INFILTRATIVE PARTIALLY OBSTRUCTING LARGE RECTAL CANCER, AREA TATOOED, DR. GERONIMO YE AT [facility]   • SIGMOIDOSCOPY N/A 11/23/2019    AN ULCERATED PARTIALLY OBSTRUCTING MASS IN MID RECTUM, AREA TATTOOED, DR. GERONIMO YE AT [facility]   • SIGMOIDOSCOPY N/A 7/22/2021    PATENT ANASTAMOSIS IN DISTAL RECTUM, RESCOPE IN 1 YR, DR. GERONIMO YE AT [facility]   • TRANSRECTAL ULTRASOUND N/A 7/24/2019    Procedure: ULTRASOUND TRANSRECTAL;  Surgeon: Geronimo Ye MD;  Location: [facility] ENDOSCOPY;  Service: General   • URETEROSCOPY LASER LITHOTRIPSY WITH STENT INSERTION Right 10/30/2019    DR. ESTUARDO BEASLEY AT [place]   • VAGINAL DELIVERY N/A 09/18/1998   • VENOUS ACCESS DEVICE (PORT) INSERTION Left 8/9/2019    Procedure: INSERTION VENOUS ACCESS DEVICE;  Surgeon: Sj Joseph MD;  Location: [facility] OR [facility];  Service: General   • VENOUS ACCESS DEVICE (PORT) INSERTION N/A 12/18/2020    Procedure: INSERTION VENOUS ACCESS DEVICE right side, removal venous access device left side;  Surgeon: Sj Joseph MD;  Location: [facility] OR [facility];  Service: General;  Laterality: N/A;   • WISDOM TOOTH EXTRACTION Bilateral 1993       Current Outpatient Medications on File Prior to Visit   Medication Sig Dispense Refill   • clonazePAM (KlonoPIN) 1 MG tablet TAKE  1 TABLET BY MOUTH 3 TIMES A DAY AS NEEDED FOR ANXIETY OR SEIZURES 90 tablet 1   • Cyanocobalamin (VITAMIN B-12 PO) Take  by mouth.     • dicyclomine (BENTYL) 20 MG tablet TAKE 1 TABLET BY MOUTH EVERY 6 HOURS AS NEEDED 360 tablet 0   • diphenoxylate-atropine (LOMOTIL) 2.5-0.025 MG per tablet TAKE 1 TABLET BY MOUTH 4 TIMES A DAY AS NEEDED FOR DIARRHEA. 120 tablet 1   • Enoxaparin Sodium (LOVENOX) 100 MG/ML solution prefilled syringe syringe Inject 1 mL under the skin into the appropriate area as directed Every 12 (Twelve) Hours. 60 mL 2   • famotidine (PEPCID) 20 MG tablet TAKE 1 TABLET BY MOUTH TWICE A  tablet 1   • Loratadine 10 MG capsule Take 10 mg by mouth Every Evening.     • metoprolol succinate XL (TOPROL-XL) 25 MG 24 hr tablet Take 0.5 tablets by mouth Every Night. 45 tablet 3   • ondansetron (ZOFRAN) 8 MG tablet Take 1 tablet by mouth 3 (Three) Times a Day As Needed for Nausea or Vomiting. 60 tablet 1   • promethazine (PHENERGAN) 25 MG tablet Take 1 tablet by mouth Every 6 (Six) Hours As Needed for Nausea or Vomiting. 60 tablet 2   • [DISCONTINUED] betamethasone dipropionate 0.05 % cream Apply 1 application topically to the appropriate area as directed 2 (Two) Times a Day. 15 g 0   • [DISCONTINUED] chlorhexidine (HIBICLENS) 4 % external liquid Apply  topically to the appropriate area as directed 2 (Two) Times a Day. Shower With Hibiclens Solution Twice The Day Before Surgery 236 mL 0   • [DISCONTINUED] escitalopram (LEXAPRO) 20 MG tablet TAKE 1 TABLET BY MOUTH EVERY DAY 90 tablet 3     No current facility-administered medications on file prior to visit.       Social History     Socioeconomic History   • Marital status:      Spouse name: Justus   • Number of children: 1   • Years of education: College   Tobacco Use   • Smoking status: Every Day     Packs/day: 1.00     Years: 24.00     Pack years: 24.00     Types: Electronic Cigarette, Cigarettes   • Smokeless tobacco: Never   • Tobacco  "comments:     1 PPD   Vaping Use   • Vaping Use: Some days   • Substances: Nicotine   • Devices: Disposable   • Passive vaping exposure: Yes   Substance and Sexual Activity   • Alcohol use: Not Currently     Comment: Caffeine use rare   • Drug use: No   • Sexual activity: Yes     Partners: Male     Comment: .           ROS    Procedures    ECG 12 Lead    Date/Time: 10/19/2022 9:01 AM  Performed by: Tasha Bustos MD  Authorized by: Tasha Bustos MD   Comparison: compared with previous ECG   Similar to previous ECG  Rhythm: sinus rhythm  Ectopy: unifocal PVCs    Clinical impression: non-specific ECG                Objective:      Vitals:    10/19/22 0855   BP: 100/78   Pulse: 94   Weight: 98.4 kg (217 lb)   Height: 167.6 cm (66\")     Body mass index is 35.02 kg/m².    Vitals reviewed.   Constitutional:       General: Not in acute distress.     Appearance: Well-developed. Not diaphoretic.   Eyes:      General: No scleral icterus.     Conjunctiva/sclera: Conjunctivae normal.   HENT:      Head: Normocephalic and atraumatic.   Neck:      Thyroid: No thyromegaly.      Vascular: No carotid bruit or JVD.      Lymphadenopathy: No cervical adenopathy.   Pulmonary:      Effort: Pulmonary effort is normal. No respiratory distress.      Breath sounds: Normal breath sounds. No wheezing. No rhonchi. No rales.   Chest:      Chest wall: Not tender to palpatation.   Cardiovascular:      Normal rate. Regular rhythm.      Murmurs: There is no murmur.      No gallop.   Pulses:     Intact distal pulses.   Edema:     Peripheral edema absent.   Abdominal:      General: Bowel sounds are normal. There is no distension or abdominal bruit.      Palpations: Abdomen is soft. There is no abdominal mass.      Tenderness: There is no abdominal tenderness.   Musculoskeletal:         General: No deformity.      Extremities: No clubbing present.     Cervical back: Neck supple. Skin:     General: Skin is warm and dry. There is no " cyanosis.      Coloration: Skin is not pale.      Findings: No rash.   Neurological:      Mental Status: Alert and oriented to person, place, and time.      Cranial Nerves: No cranial nerve deficit.   Psychiatric:         Judgment: Judgment normal.         Lab Review:       Assessment:      Diagnosis Plan   1. Primary hypertension  Adult Transthoracic Echo Limited W/ Cont if Necessary Per Protocol      2. Heterozygous factor V Leiden mutation (HCC)        3. On anticoagulant therapy  Adult Transthoracic Echo Limited W/ Cont if Necessary Per Protocol      4. Adenocarcinoma of rectum (HCC)  Adult Transthoracic Echo Limited W/ Cont if Necessary Per Protocol      5. Coronary artery calcification  Adult Transthoracic Echo Limited W/ Cont if Necessary Per Protocol        1. Adenocarcinoma of the rectum, chemotherapeutics as noted above.  The bevacizumab (Avastin) is a recombinant antibody targeting VEGF receptor binding.  The fluorouracil as an antimetabolite.  These can increase risk of coronary artery disease, coronary artery spasm with angina, myocardial infarction and cardiomyopathy.  2. Factor V Leiden with history of DVTs and pulmonary emboli, on therapeutic Lovenox.  3. Tobacco use - 1ppd.  Advised cessation.  4. Sinus tachycardia.  Better  5. Mild LVH with grade 1 diastolic dysfunction - normalized on metoprolol succinate 12.5 mg nightly.  She is tolerating this well.  6. Hypotension and severe fatigue with lisinopril 5 mg daily, this was stopped.   7. Coronary artery calcification mid LAD, if she does not need to restart chemo in January, would consider starting low-dose rosuvastatin at her next appointment     Plan:       Repeat limited echo in January as she may have to start chemo again in January but right now she is off of it.  Her scans show no cancer and she is feeling great.  See back in 6 months.

## 2022-11-01 ENCOUNTER — INFUSION (OUTPATIENT)
Dept: ONCOLOGY | Facility: HOSPITAL | Age: 42
End: 2022-11-01

## 2022-11-01 DIAGNOSIS — Z45.2 ENCOUNTER FOR FITTING AND ADJUSTMENT OF VASCULAR CATHETER: Primary | ICD-10-CM

## 2022-11-01 PROCEDURE — 96523 IRRIG DRUG DELIVERY DEVICE: CPT

## 2022-11-01 PROCEDURE — 25010000002 HEPARIN LOCK FLUSH PER 10 UNITS: Performed by: INTERNAL MEDICINE

## 2022-11-01 RX ORDER — HEPARIN SODIUM (PORCINE) LOCK FLUSH IV SOLN 100 UNIT/ML 100 UNIT/ML
500 SOLUTION INTRAVENOUS AS NEEDED
Status: DISCONTINUED | OUTPATIENT
Start: 2022-11-01 | End: 2022-11-01 | Stop reason: HOSPADM

## 2022-11-01 RX ORDER — SODIUM CHLORIDE 0.9 % (FLUSH) 0.9 %
10 SYRINGE (ML) INJECTION AS NEEDED
Status: CANCELLED | OUTPATIENT
Start: 2022-11-01

## 2022-11-01 RX ORDER — SODIUM CHLORIDE 0.9 % (FLUSH) 0.9 %
10 SYRINGE (ML) INJECTION AS NEEDED
Status: DISCONTINUED | OUTPATIENT
Start: 2022-11-01 | End: 2022-11-01 | Stop reason: HOSPADM

## 2022-11-01 RX ORDER — HEPARIN SODIUM (PORCINE) LOCK FLUSH IV SOLN 100 UNIT/ML 100 UNIT/ML
500 SOLUTION INTRAVENOUS AS NEEDED
Status: CANCELLED | OUTPATIENT
Start: 2022-11-01

## 2022-11-01 RX ADMIN — Medication 500 UNITS: at 09:08

## 2022-11-01 RX ADMIN — Medication 10 ML: at 09:08

## 2022-11-03 ENCOUNTER — ANESTHESIA (OUTPATIENT)
Dept: GASTROENTEROLOGY | Facility: HOSPITAL | Age: 42
End: 2022-11-03

## 2022-11-03 ENCOUNTER — ANESTHESIA EVENT (OUTPATIENT)
Dept: GASTROENTEROLOGY | Facility: HOSPITAL | Age: 42
End: 2022-11-03

## 2022-11-03 ENCOUNTER — HOSPITAL ENCOUNTER (OUTPATIENT)
Facility: HOSPITAL | Age: 42
Setting detail: HOSPITAL OUTPATIENT SURGERY
Discharge: HOME | End: 2022-11-03
Attending: COLON & RECTAL SURGERY | Admitting: COLON & RECTAL SURGERY

## 2022-11-03 VITALS
DIASTOLIC BLOOD PRESSURE: 105 MMHG | TEMPERATURE: 97.8 F | RESPIRATION RATE: 18 BRPM | HEIGHT: 66 IN | SYSTOLIC BLOOD PRESSURE: 152 MMHG | WEIGHT: 220 LBS | BODY MASS INDEX: 35.36 KG/M2 | OXYGEN SATURATION: 100 % | HEART RATE: 87 BPM

## 2022-11-03 PROCEDURE — 25010000002 HEPARIN LOCK FLUSH PER 10 UNITS: Performed by: COLON & RECTAL SURGERY

## 2022-11-03 PROCEDURE — 25010000002 PROPOFOL 10 MG/ML EMULSION: Performed by: ANESTHESIOLOGY

## 2022-11-03 RX ORDER — MIDAZOLAM HYDROCHLORIDE 1 MG/ML
1 INJECTION INTRAMUSCULAR; INTRAVENOUS
Status: DISCONTINUED | OUTPATIENT
Start: 2022-11-03 | End: 2022-11-03 | Stop reason: HOSPADM

## 2022-11-03 RX ORDER — SODIUM CHLORIDE, SODIUM LACTATE, POTASSIUM CHLORIDE, CALCIUM CHLORIDE 600; 310; 30; 20 MG/100ML; MG/100ML; MG/100ML; MG/100ML
9 INJECTION, SOLUTION INTRAVENOUS CONTINUOUS
Status: DISCONTINUED | OUTPATIENT
Start: 2022-11-03 | End: 2022-11-03 | Stop reason: HOSPADM

## 2022-11-03 RX ORDER — FAMOTIDINE 10 MG/ML
20 INJECTION, SOLUTION INTRAVENOUS ONCE
Status: DISCONTINUED | OUTPATIENT
Start: 2022-11-03 | End: 2022-11-03 | Stop reason: HOSPADM

## 2022-11-03 RX ORDER — PROPOFOL 10 MG/ML
VIAL (ML) INTRAVENOUS AS NEEDED
Status: DISCONTINUED | OUTPATIENT
Start: 2022-11-03 | End: 2022-11-03 | Stop reason: SURG

## 2022-11-03 RX ORDER — HEPARIN SODIUM (PORCINE) LOCK FLUSH IV SOLN 100 UNIT/ML 100 UNIT/ML
500 SOLUTION INTRAVENOUS ONCE
Status: COMPLETED | OUTPATIENT
Start: 2022-11-03 | End: 2022-11-03

## 2022-11-03 RX ORDER — FENTANYL CITRATE 50 UG/ML
50 INJECTION, SOLUTION INTRAMUSCULAR; INTRAVENOUS
Status: DISCONTINUED | OUTPATIENT
Start: 2022-11-03 | End: 2022-11-03 | Stop reason: HOSPADM

## 2022-11-03 RX ORDER — SODIUM CHLORIDE 0.9 % (FLUSH) 0.9 %
3-10 SYRINGE (ML) INJECTION AS NEEDED
Status: DISCONTINUED | OUTPATIENT
Start: 2022-11-03 | End: 2022-11-03 | Stop reason: HOSPADM

## 2022-11-03 RX ORDER — LIDOCAINE HYDROCHLORIDE 20 MG/ML
INJECTION, SOLUTION INFILTRATION; PERINEURAL AS NEEDED
Status: DISCONTINUED | OUTPATIENT
Start: 2022-11-03 | End: 2022-11-03 | Stop reason: SURG

## 2022-11-03 RX ORDER — LIDOCAINE HYDROCHLORIDE 10 MG/ML
0.5 INJECTION, SOLUTION EPIDURAL; INFILTRATION; INTRACAUDAL; PERINEURAL ONCE AS NEEDED
Status: DISCONTINUED | OUTPATIENT
Start: 2022-11-03 | End: 2022-11-03 | Stop reason: HOSPADM

## 2022-11-03 RX ORDER — SODIUM CHLORIDE 0.9 % (FLUSH) 0.9 %
3 SYRINGE (ML) INJECTION EVERY 12 HOURS SCHEDULED
Status: DISCONTINUED | OUTPATIENT
Start: 2022-11-03 | End: 2022-11-03 | Stop reason: HOSPADM

## 2022-11-03 RX ADMIN — PROPOFOL 100 MG: 10 INJECTION, EMULSION INTRAVENOUS at 13:12

## 2022-11-03 RX ADMIN — LIDOCAINE HYDROCHLORIDE 100 MG: 20 INJECTION, SOLUTION INFILTRATION; PERINEURAL at 13:10

## 2022-11-03 RX ADMIN — PROPOFOL 140 MCG/KG/MIN: 10 INJECTION, EMULSION INTRAVENOUS at 13:13

## 2022-11-03 RX ADMIN — HEPARIN 500 UNITS: 100 SYRINGE at 13:57

## 2022-11-03 RX ADMIN — SODIUM CHLORIDE, POTASSIUM CHLORIDE, SODIUM LACTATE AND CALCIUM CHLORIDE 9 ML/HR: 600; 310; 30; 20 INJECTION, SOLUTION INTRAVENOUS at 13:10

## 2022-11-03 NOTE — H&P
Carole Weaver is a 42 y.o. female  who is referred by Geronimo Ye MD for a colonoscopy. She   has an indications: previous colon cancer.     She denies any change in bowel function, melena, or hematochezia.    Past Medical History:   Diagnosis Date   • Abdominopelvic abscess (HCC) 08/12/2020    ADMITTED TO Located within [facility]   • Abnormal Pap smear of cervix 02/02/1998    JULIUS I   • Anemia in pregnancy    • Anemia in pregnancy    • Anxiety    • Bilateral epiphora 04/2020   • Bilateral hand swelling 05/02/2020    SEEN AT [facility] ER   • Cervical lymphadenitis 06/06/2012    SEEN AT [facility] ER   • Chemotherapy induced diarrhea 01/2021   • Chemotherapy induced neutropenia (HCC) 04/2020   • Chemotherapy-induced nausea 02/2020   • Chemotherapy-induced thrombocytopenia 05/2020   • Chronic diarrhea    • Colon polyps     FOLLOWED BY DR. GERONIMO YE   • Cystitis 04/24/2020    WITH DEHYDRATION, ADMITTED TO [facility]   • Cystitis 10/27/2020   • Depression    • Drug-induced peripheral neuropathy ([facility]) 05/2020   • Factor V Leiden mutation ([facility])    • Fistula of intestine    • Gallstones    • GERD (gastroesophageal reflux disease)    • Hand foot syndrome 05/2020   • Hearing loss     left ear from chemo   • Heart murmur     IN CHILDHOOD   • Hematochezia    • Hemorrhoids    • Heterozygous factor V Leiden mutation ([facility])     DX 8-2-2019   • History of anemia    • History of chemotherapy 2019    FOLLOWED BY DR. ALEXANDRU HUNT   • History of gestational diabetes    • History of pre-eclampsia 1998   • History of radiation therapy 2019    FOLLOWED BY DR. JAVON LEWIS   • History of TB skin testing 01/17/2009    TB Skin Test   • HPV in female 1998   • Hypokalemia 09/2019   • Hypomagnesemia 09/2019   • Hyponatremia 06/2021   • IBS (irritable bowel syndrome)    • Ileostomy in place ([facility])     FOLLOWED BY DR. GERONIMO YE   • Infected insect bite of neck 05/27/2016    SEEN AT [facility]   • Kidney stones 08/09/2007    SEEN AT [facility] ER   • Lump of right breast     SWOLLEN  LYMPH NODE   • Lung cancer (HCC) 2020    METASTATIC LUNG CANCER   • Macrocytosis 2021   • Monopolar depression (HCC)    • On anticoagulant therapy    • Pain associated with surgical procedure 2020   • Palmar-plantar erythrodysesthesia 2021   • Perirectal abscess 2020   • Pulmonary embolism without acute cor pulmonale (HCC) 09/20/2019    X 3; ADMITTED TO [facility]   • Pulmonary nodule, right 2020   • Rectal cancer (HCC) 2019    STAGE IIA, INVASIVE MODERATELY DIFFERENTIATED ADENOCARCINOMA, CHEMO AND XRT FINISHED 2019   • Right shoulder pain 2020    SEEN AT [facility] ER   • Right ureteral stone 10/01/2019    SEEN AT [facility] ER   • SAB (spontaneous ) 1996     A2-1 INDUCED   • Sciatica    • Sepsis due to cellulitis (HCC) 2002    LEFT GREAT TOE, ADMITTED TO [facility]   • Tachycardia 2020   • Thrombosis of superior vena cava (HCC) 2020    AND BRACHIOCEPHALIC VEIN, ADMITTED TO [facility]   • Urinary urgency 2020       Past Surgical History:   Procedure Laterality Date   • ABDOMINAL SURGERY     • CHOLECYSTECTOMY N/A 10/10/2003    LAPAROSCOPIC WITH CHOLANGIOGRAM, DR. JAMEY TALAVERA AT [facility]   • COLON RESECTION N/A 2019    Procedure: laparoscopic low anterior colon resection with mobilization of splenic flexure and diverting loop ileostomy: ERAS;  Surgeon: Padmaja Esparza MD;  Location: [facility];  Service: General   • COLON RESECTION N/A 8/3/2020    Procedure: LOW ANTERIOR COLON RESECTION, COLOANAL ANASTOMOSIS, MOBILIZATION SPLENIC FLEXURE;  Surgeon: Padmaja Esparza MD;  Location: [facility] OR;  Service: General;  Laterality: N/A;   • COLONOSCOPY N/A 7/15/2020    PATENT ANASTAMOSIS IN MID RECTUM, RESCOPE IN 1 YR, DR. PADMAJA ESPARZA AT [facility]   • COLONOSCOPY W/ POLYPECTOMY N/A 2019    15 MM TUBULOVILLOUS ADENOMA POLYP IN HEPATIC FLEXURE, 20 MMTUBULOVILLOUS ADENOMA WITH HIGH GRADE DYSPLASIA IN RECTOSIGMOID, 4 CM MASS IN MID RECTUM, PATH: INVASIVE MODERATELY  DIFFERENTIATED ADENOCARCINOMA, DR. JENNIFER LI AT [facility]   • DILATATION AND EVACUATION N/A 2009   • ENDOSCOPY N/A 07/08/2019    LA GRADE A ESOPHAGITIS, GASTRITIS, ALL BIOPSIES BENIGN, DR. JENNIFER LI AT [facility]   • INCISION AND DRAINAGE PERIRECTAL ABSCESS N/A 8/14/2020    Procedure: INCISION AND DRAINAGE OF retrorectal dehiscence abcess with drain placement and irrigation;  Surgeon: Geronimo Ye MD;  Location: [facility] OR;  Service: General;  Laterality: N/A;   • PAP SMEAR N/A 01/24/2014   • SIGMOIDOSCOPY N/A 7/24/2019    INFILTRATIVE PARTIALLY OBSTRUCTING LARGE RECTAL CANCER, AREA TATOOED, DR. GERONIMO YE AT [facility]   • SIGMOIDOSCOPY N/A 11/23/2019    AN ULCERATED PARTIALLY OBSTRUCTING MASS IN MID RECTUM, AREA TATTOOED, DR. GERONIMO YE AT [facility]   • SIGMOIDOSCOPY N/A 7/22/2021    PATENT ANASTAMOSIS IN DISTAL RECTUM, RESCOPE IN 1 YR, DR. GERONIMO YE AT [facility]   • TRANSRECTAL ULTRASOUND N/A 7/24/2019    Procedure: ULTRASOUND TRANSRECTAL;  Surgeon: Geronimo Ye MD;  Location: [facility] ENDOSCOPY;  Service: General   • URETEROSCOPY LASER LITHOTRIPSY WITH STENT INSERTION Right 10/30/2019    DR. ESTUARDO BEASLEY AT [place]   • VAGINAL DELIVERY N/A 09/18/1998   • VENOUS ACCESS DEVICE (PORT) INSERTION Left 8/9/2019    Procedure: INSERTION VENOUS ACCESS DEVICE;  Surgeon: Sj Joseph MD;  Location: [facility] OR [facility];  Service: General   • VENOUS ACCESS DEVICE (PORT) INSERTION N/A 12/18/2020    Procedure: INSERTION VENOUS ACCESS DEVICE right side, removal venous access device left side;  Surgeon: Sj Joseph MD;  Location: [facility] OR [facility];  Service: General;  Laterality: N/A;   • WISDOM TOOTH EXTRACTION Bilateral 1993       Medications Prior to Admission   Medication Sig Dispense Refill Last Dose   • clonazePAM (KlonoPIN) 1 MG tablet TAKE 1 TABLET BY MOUTH 3 TIMES A DAY AS NEEDED FOR ANXIETY OR SEIZURES 90 tablet 1 11/2/2022   • Cyanocobalamin (VITAMIN B-12 PO) Take  by mouth.   11/2/2022    • dicyclomine (BENTYL) 20 MG tablet TAKE 1 TABLET BY MOUTH EVERY 6 HOURS AS NEEDED 360 tablet 0 11/2/2022   • diphenoxylate-atropine (LOMOTIL) 2.5-0.025 MG per tablet TAKE 1 TABLET BY MOUTH 4 TIMES A DAY AS NEEDED FOR DIARRHEA. 120 tablet 1 11/2/2022   • Enoxaparin Sodium (LOVENOX) 100 MG/ML solution prefilled syringe syringe Inject 1 mL under the skin into the appropriate area as directed Every 12 (Twelve) Hours. 60 mL 2 11/2/2022 at 2000   • famotidine (PEPCID) 20 MG tablet TAKE 1 TABLET BY MOUTH TWICE A  tablet 1 11/2/2022   • Loratadine 10 MG capsule Take 10 mg by mouth Every Evening.   11/2/2022   • metoprolol succinate XL (TOPROL-XL) 25 MG 24 hr tablet Take 0.5 tablets by mouth Every Night. 45 tablet 3 11/2/2022 at 1100pm   • ondansetron (ZOFRAN) 8 MG tablet Take 1 tablet by mouth 3 (Three) Times a Day As Needed for Nausea or Vomiting. 60 tablet 1 11/2/2022   • promethazine (PHENERGAN) 25 MG tablet Take 1 tablet by mouth Every 6 (Six) Hours As Needed for Nausea or Vomiting. 60 tablet 2 Past Week       No Known Allergies    Family History   Problem Relation Age of Onset   • Hyperlipidemia Mother    • Colon polyps Mother    • Heart disease Mother    • Hypertension Mother    • Factor V Leiden deficiency Mother    • Skin cancer Father         Squamous in 60s   • Atrial fibrillation Father    • Drug abuse Sister    • Mental illness Sister         Addiction   • No Known Problems Son    • Colon cancer Maternal Uncle    • Cancer Paternal Uncle    • Colon cancer Paternal Uncle    • Diabetes Paternal Uncle    • Heart disease Paternal Grandfather    • Cancer Paternal Aunt         OVARIAN   • Malig Hyperthermia Neg Hx        Social History     Socioeconomic History   • Marital status:      Spouse name: Justus   • Number of children: 1   • Years of education: College   Tobacco Use   • Smoking status: Every Day     Packs/day: 1.00     Years: 25.00     Pack years: 25.00     Types: Electronic Cigarette,  Cigarettes   • Smokeless tobacco: Never   • Tobacco comments:     1 PPD   Vaping Use   • Vaping Use: Some days   • Substances: Nicotine   • Devices: Disposable   • Passive vaping exposure: Yes   Substance and Sexual Activity   • Alcohol use: Never     Comment: Caffeine use rare   • Drug use: Never   • Sexual activity: Yes     Partners: Male     Comment: .       Review of Systems   Gastrointestinal: Negative for abdominal pain, nausea and vomiting.   All other systems reviewed and are negative.      Vitals:    11/03/22 1251   Pulse:    Resp:    Temp: 97.8 °F (36.6 °C)   SpO2:          Physical Exam  Constitutional:       Appearance: She is well-developed.   HENT:      Head: Normocephalic and atraumatic.   Eyes:      Pupils: Pupils are equal, round, and reactive to light.   Cardiovascular:      Rate and Rhythm: Regular rhythm.   Pulmonary:      Effort: Pulmonary effort is normal.   Abdominal:      General: There is no distension.      Palpations: Abdomen is soft.   Musculoskeletal:         General: Normal range of motion.   Skin:     General: Skin is warm and dry.   Neurological:      Mental Status: She is alert and oriented to person, place, and time.   Psychiatric:         Thought Content: Thought content normal.         Judgment: Judgment normal.           Assessment & Plan      indications: previous rectal cancer         I recommend colonoscopy.  I described risks, benefits of the procedure with the patient including but not limited to bleeding, infection, possibility of perforation and possible polypectomy. All of the patient's questions were answered and they would like to proceed with the above recommendations.

## 2022-11-03 NOTE — ANESTHESIA PREPROCEDURE EVALUATION
" Anesthesia Evaluation     Patient summary reviewed and Nursing notes reviewed   NPO Solid Status: > 8 hours             Airway   Mallampati: II  Dental      Pulmonary    (+) pulmonary embolism, lung cancer,   Cardiovascular     (+) hypertension, valvular problems/murmurs,       Neuro/Psych  (+) numbness, psychiatric history,    GI/Hepatic/Renal/Endo    (+)  GERD, GI bleeding , renal disease,     Musculoskeletal (-) negative ROS    Abdominal    Substance History - negative use     OB/GYN negative ob/gyn ROS         Other      history of cancer active                    Anesthesia Plan    ASA 3     MAC     (Pulse 92   Temp 36.6 °C (97.8 °F) (Oral)   Resp 16   Ht 167.6 cm (66\")   Wt 99.8 kg (220 lb)   SpO2 97%   BMI 35.51 kg/m²     I have reviewed the patient's history with the patient and the chart, including all pertinent laboratory results and imaging. I have explained the risks of anesthesia including but not limited to dental damage, corneal abrasion, nerve injury, MI, stroke, and death.    )    Anesthetic plan, risks, benefits, and alternatives have been provided, discussed and informed consent has been obtained with: patient.        CODE STATUS:       "

## 2022-11-03 NOTE — ANESTHESIA POSTPROCEDURE EVALUATION
"Patient: Carole Weaver    Procedure Summary     Date: 11/03/22 Room / Location: [facility] ENDOSCOPY 6 / [facility] ENDOSCOPY    Anesthesia Start: 1307 Anesthesia Stop: 1326    Procedure: COLONOSCOPY to transverse colon Diagnosis:       History of rectal cancer      Family history of colon cancer      (History of rectal cancer [Z85.048])      (Family history of colon cancer [Z80.0])    Surgeons: Padmaja Ye MD Provider: Milan Adler MD    Anesthesia Type: Not recorded ASA Status: Not recorded          Anesthesia Type: No value filed.    Vitals  Vitals Value Taken Time   /139 11/03/22 1330   Temp     Pulse 89 11/03/22 1333   Resp     SpO2 100 % 11/03/22 1333   Vitals shown include unvalidated device data.        Post Anesthesia Care and Evaluation    Patient location during evaluation: bedside  Patient participation: complete - patient participated  Level of consciousness: awake  Pain management: adequate    Airway patency: patent  Anesthetic complications: No anesthetic complications  PONV Status: controlled  Cardiovascular status: acceptable  Respiratory status: acceptable  Hydration status: acceptable    Comments: Pulse 92   Temp 36.6 °C (97.8 °F) (Oral)   Resp 16   Ht 167.6 cm (66\")   Wt 99.8 kg (220 lb)   SpO2 97%   BMI 35.51 kg/m²         "

## 2022-11-03 NOTE — H&P (VIEW-ONLY)
Carole Weaver is a 42 y.o. female  who is referred by Geronimo Ye MD for a colonoscopy. She   has an indications: previous colon cancer.     She denies any change in bowel function, melena, or hematochezia.    Past Medical History:   Diagnosis Date   • Abdominopelvic abscess (HCC) 08/12/2020    ADMITTED TO [facility]   • Abnormal Pap smear of cervix 02/02/1998    JULIUS I   • Anemia in pregnancy    • Anemia in pregnancy    • Anxiety    • Bilateral epiphora 04/2020   • Bilateral hand swelling 05/02/2020    SEEN AT [facility] ER   • Cervical lymphadenitis 06/06/2012    SEEN AT [facility] ER   • Chemotherapy induced diarrhea 01/2021   • Chemotherapy induced neutropenia (HCC) 04/2020   • Chemotherapy-induced nausea 02/2020   • Chemotherapy-induced thrombocytopenia 05/2020   • Chronic diarrhea    • Colon polyps     FOLLOWED BY DR. GERONIMO YE   • Cystitis 04/24/2020    WITH DEHYDRATION, ADMITTED TO [facility]   • Cystitis 10/27/2020   • Depression    • Drug-induced peripheral neuropathy ([facility]) 05/2020   • Factor V Leiden mutation ([facility])    • Fistula of intestine    • Gallstones    • GERD (gastroesophageal reflux disease)    • Hand foot syndrome 05/2020   • Hearing loss     left ear from chemo   • Heart murmur     IN CHILDHOOD   • Hematochezia    • Hemorrhoids    • Heterozygous factor V Leiden mutation ([facility])     DX 8-2-2019   • History of anemia    • History of chemotherapy 2019    FOLLOWED BY DR. ALEXANDRU HUNT   • History of gestational diabetes    • History of pre-eclampsia 1998   • History of radiation therapy 2019    FOLLOWED BY DR. JAVON LEWIS   • History of TB skin testing 01/17/2009    TB Skin Test   • HPV in female 1998   • Hypokalemia 09/2019   • Hypomagnesemia 09/2019   • Hyponatremia 06/2021   • IBS (irritable bowel syndrome)    • Ileostomy in place ([facility])     FOLLOWED BY DR. GERONIMO YE   • Infected insect bite of neck 05/27/2016    SEEN AT [facility]   • Kidney stones 08/09/2007    SEEN AT [facility] ER   • Lump of right breast     SWOLLEN  LYMPH NODE   • Lung cancer (HCC) 2020    METASTATIC LUNG CANCER   • Macrocytosis 2021   • Monopolar depression (HCC)    • On anticoagulant therapy    • Pain associated with surgical procedure 2020   • Palmar-plantar erythrodysesthesia 2021   • Perirectal abscess 2020   • Pulmonary embolism without acute cor pulmonale (HCC) 09/20/2019    X 3; ADMITTED TO [facility]   • Pulmonary nodule, right 2020   • Rectal cancer (HCC) 2019    STAGE IIA, INVASIVE MODERATELY DIFFERENTIATED ADENOCARCINOMA, CHEMO AND XRT FINISHED 2019   • Right shoulder pain 2020    SEEN AT [facility] ER   • Right ureteral stone 10/01/2019    SEEN AT [facility] ER   • SAB (spontaneous ) 1996     A2-1 INDUCED   • Sciatica    • Sepsis due to cellulitis (HCC) 2002    LEFT GREAT TOE, ADMITTED TO [facility]   • Tachycardia 2020   • Thrombosis of superior vena cava (HCC) 2020    AND BRACHIOCEPHALIC VEIN, ADMITTED TO [facility]   • Urinary urgency 2020       Past Surgical History:   Procedure Laterality Date   • ABDOMINAL SURGERY     • CHOLECYSTECTOMY N/A 10/10/2003    LAPAROSCOPIC WITH CHOLANGIOGRAM, DR. JAMEY TALAVERA AT [facility]   • COLON RESECTION N/A 2019    Procedure: laparoscopic low anterior colon resection with mobilization of splenic flexure and diverting loop ileostomy: ERAS;  Surgeon: Padmaja Esparza MD;  Location: [facility];  Service: General   • COLON RESECTION N/A 8/3/2020    Procedure: LOW ANTERIOR COLON RESECTION, COLOANAL ANASTOMOSIS, MOBILIZATION SPLENIC FLEXURE;  Surgeon: Padmaja Esparza MD;  Location: [facility] OR;  Service: General;  Laterality: N/A;   • COLONOSCOPY N/A 7/15/2020    PATENT ANASTAMOSIS IN MID RECTUM, RESCOPE IN 1 YR, DR. PADMAJA ESPARZA AT [facility]   • COLONOSCOPY W/ POLYPECTOMY N/A 2019    15 MM TUBULOVILLOUS ADENOMA POLYP IN HEPATIC FLEXURE, 20 MMTUBULOVILLOUS ADENOMA WITH HIGH GRADE DYSPLASIA IN RECTOSIGMOID, 4 CM MASS IN MID RECTUM, PATH: INVASIVE MODERATELY  DIFFERENTIATED ADENOCARCINOMA, DR. JENNIFER LI AT [facility]   • DILATATION AND EVACUATION N/A 2009   • ENDOSCOPY N/A 07/08/2019    LA GRADE A ESOPHAGITIS, GASTRITIS, ALL BIOPSIES BENIGN, DR. JENNIFER LI AT [facility]   • INCISION AND DRAINAGE PERIRECTAL ABSCESS N/A 8/14/2020    Procedure: INCISION AND DRAINAGE OF retrorectal dehiscence abcess with drain placement and irrigation;  Surgeon: Geronimo Ye MD;  Location: [facility] OR;  Service: General;  Laterality: N/A;   • PAP SMEAR N/A 01/24/2014   • SIGMOIDOSCOPY N/A 7/24/2019    INFILTRATIVE PARTIALLY OBSTRUCTING LARGE RECTAL CANCER, AREA TATOOED, DR. GERONIMO YE AT [facility]   • SIGMOIDOSCOPY N/A 11/23/2019    AN ULCERATED PARTIALLY OBSTRUCTING MASS IN MID RECTUM, AREA TATTOOED, DR. GERONIMO YE AT [facility]   • SIGMOIDOSCOPY N/A 7/22/2021    PATENT ANASTAMOSIS IN DISTAL RECTUM, RESCOPE IN 1 YR, DR. GERONIMO YE AT [facility]   • TRANSRECTAL ULTRASOUND N/A 7/24/2019    Procedure: ULTRASOUND TRANSRECTAL;  Surgeon: Geronimo Ye MD;  Location: [facility] ENDOSCOPY;  Service: General   • URETEROSCOPY LASER LITHOTRIPSY WITH STENT INSERTION Right 10/30/2019    DR. ESTUARDO BEASLEY AT [place]   • VAGINAL DELIVERY N/A 09/18/1998   • VENOUS ACCESS DEVICE (PORT) INSERTION Left 8/9/2019    Procedure: INSERTION VENOUS ACCESS DEVICE;  Surgeon: Sj Joseph MD;  Location: [facility] OR [facility];  Service: General   • VENOUS ACCESS DEVICE (PORT) INSERTION N/A 12/18/2020    Procedure: INSERTION VENOUS ACCESS DEVICE right side, removal venous access device left side;  Surgeon: Sj Joseph MD;  Location: [facility];  Service: General;  Laterality: N/A;   • WISDOM TOOTH EXTRACTION Bilateral 1993       Medications Prior to Admission   Medication Sig Dispense Refill Last Dose   • clonazePAM (KlonoPIN) 1 MG tablet TAKE 1 TABLET BY MOUTH 3 TIMES A DAY AS NEEDED FOR ANXIETY OR SEIZURES 90 tablet 1 11/2/2022   • Cyanocobalamin (VITAMIN B-12 PO) Take  by mouth.   11/2/2022    • dicyclomine (BENTYL) 20 MG tablet TAKE 1 TABLET BY MOUTH EVERY 6 HOURS AS NEEDED 360 tablet 0 11/2/2022   • diphenoxylate-atropine (LOMOTIL) 2.5-0.025 MG per tablet TAKE 1 TABLET BY MOUTH 4 TIMES A DAY AS NEEDED FOR DIARRHEA. 120 tablet 1 11/2/2022   • Enoxaparin Sodium (LOVENOX) 100 MG/ML solution prefilled syringe syringe Inject 1 mL under the skin into the appropriate area as directed Every 12 (Twelve) Hours. 60 mL 2 11/2/2022 at 2000   • famotidine (PEPCID) 20 MG tablet TAKE 1 TABLET BY MOUTH TWICE A  tablet 1 11/2/2022   • Loratadine 10 MG capsule Take 10 mg by mouth Every Evening.   11/2/2022   • metoprolol succinate XL (TOPROL-XL) 25 MG 24 hr tablet Take 0.5 tablets by mouth Every Night. 45 tablet 3 11/2/2022 at 1100pm   • ondansetron (ZOFRAN) 8 MG tablet Take 1 tablet by mouth 3 (Three) Times a Day As Needed for Nausea or Vomiting. 60 tablet 1 11/2/2022   • promethazine (PHENERGAN) 25 MG tablet Take 1 tablet by mouth Every 6 (Six) Hours As Needed for Nausea or Vomiting. 60 tablet 2 Past Week       No Known Allergies    Family History   Problem Relation Age of Onset   • Hyperlipidemia Mother    • Colon polyps Mother    • Heart disease Mother    • Hypertension Mother    • Factor V Leiden deficiency Mother    • Skin cancer Father         Squamous in 60s   • Atrial fibrillation Father    • Drug abuse Sister    • Mental illness Sister         Addiction   • No Known Problems Son    • Colon cancer Maternal Uncle    • Cancer Paternal Uncle    • Colon cancer Paternal Uncle    • Diabetes Paternal Uncle    • Heart disease Paternal Grandfather    • Cancer Paternal Aunt         OVARIAN   • Malig Hyperthermia Neg Hx        Social History     Socioeconomic History   • Marital status:      Spouse name: Justus   • Number of children: 1   • Years of education: College   Tobacco Use   • Smoking status: Every Day     Packs/day: 1.00     Years: 25.00     Pack years: 25.00     Types: Electronic Cigarette,  Cigarettes   • Smokeless tobacco: Never   • Tobacco comments:     1 PPD   Vaping Use   • Vaping Use: Some days   • Substances: Nicotine   • Devices: Disposable   • Passive vaping exposure: Yes   Substance and Sexual Activity   • Alcohol use: Never     Comment: Caffeine use rare   • Drug use: Never   • Sexual activity: Yes     Partners: Male     Comment: .       Review of Systems   Gastrointestinal: Negative for abdominal pain, nausea and vomiting.   All other systems reviewed and are negative.      Vitals:    11/03/22 1251   Pulse:    Resp:    Temp: 97.8 °F (36.6 °C)   SpO2:          Physical Exam  Constitutional:       Appearance: She is well-developed.   HENT:      Head: Normocephalic and atraumatic.   Eyes:      Pupils: Pupils are equal, round, and reactive to light.   Cardiovascular:      Rate and Rhythm: Regular rhythm.   Pulmonary:      Effort: Pulmonary effort is normal.   Abdominal:      General: There is no distension.      Palpations: Abdomen is soft.   Musculoskeletal:         General: Normal range of motion.   Skin:     General: Skin is warm and dry.   Neurological:      Mental Status: She is alert and oriented to person, place, and time.   Psychiatric:         Thought Content: Thought content normal.         Judgment: Judgment normal.           Assessment & Plan      indications: previous rectal cancer         I recommend colonoscopy.  I described risks, benefits of the procedure with the patient including but not limited to bleeding, infection, possibility of perforation and possible polypectomy. All of the patient's questions were answered and they would like to proceed with the above recommendations.

## 2022-11-08 ENCOUNTER — PRE-ADMISSION TESTING (OUTPATIENT)
Dept: PREADMISSION TESTING | Facility: HOSPITAL | Age: 42
End: 2022-11-08

## 2022-11-08 VITALS
SYSTOLIC BLOOD PRESSURE: 114 MMHG | BODY MASS INDEX: 35.21 KG/M2 | OXYGEN SATURATION: 99 % | TEMPERATURE: 97.9 F | HEIGHT: 66 IN | HEART RATE: 87 BPM | RESPIRATION RATE: 18 BRPM | WEIGHT: 219.1 LBS | DIASTOLIC BLOOD PRESSURE: 76 MMHG

## 2022-11-08 LAB
ANION GAP SERPL CALCULATED.3IONS-SCNC: 9 MMOL/L (ref 5–15)
APTT PPP: 28.5 SECONDS (ref 22.7–35.4)
BUN SERPL-MCNC: 10 MG/DL (ref 6–20)
BUN/CREAT SERPL: 14.1 (ref 7–25)
CALCIUM SPEC-SCNC: 9 MG/DL (ref 8.6–10.5)
CHLORIDE SERPL-SCNC: 105 MMOL/L (ref 98–107)
CO2 SERPL-SCNC: 22 MMOL/L (ref 22–29)
CREAT SERPL-MCNC: 0.71 MG/DL (ref 0.57–1)
DEPRECATED RDW RBC AUTO: 43.5 FL (ref 37–54)
EGFRCR SERPLBLD CKD-EPI 2021: 108.3 ML/MIN/1.73
ERYTHROCYTE [DISTWIDTH] IN BLOOD BY AUTOMATED COUNT: 12.3 % (ref 12.3–15.4)
GLUCOSE SERPL-MCNC: 100 MG/DL (ref 65–99)
HCT VFR BLD AUTO: 44.7 % (ref 34–46.6)
HGB BLD-MCNC: 14.8 G/DL (ref 12–15.9)
INR PPP: 0.96 (ref 0.9–1.1)
MCH RBC QN AUTO: 31.9 PG (ref 26.6–33)
MCHC RBC AUTO-ENTMCNC: 33.1 G/DL (ref 31.5–35.7)
MCV RBC AUTO: 96.3 FL (ref 79–97)
PLATELET # BLD AUTO: 217 10*3/MM3 (ref 140–450)
PMV BLD AUTO: 9.3 FL (ref 6–12)
POTASSIUM SERPL-SCNC: 4.4 MMOL/L (ref 3.5–5.2)
PROTHROMBIN TIME: 12.9 SECONDS (ref 11.7–14.2)
RBC # BLD AUTO: 4.64 10*6/MM3 (ref 3.77–5.28)
SODIUM SERPL-SCNC: 136 MMOL/L (ref 136–145)
WBC NRBC COR # BLD: 5.95 10*3/MM3 (ref 3.4–10.8)

## 2022-11-08 PROCEDURE — 85027 COMPLETE CBC AUTOMATED: CPT

## 2022-11-08 PROCEDURE — 85610 PROTHROMBIN TIME: CPT

## 2022-11-08 PROCEDURE — 80048 BASIC METABOLIC PNL TOTAL CA: CPT

## 2022-11-08 PROCEDURE — 85730 THROMBOPLASTIN TIME PARTIAL: CPT

## 2022-11-08 PROCEDURE — 36415 COLL VENOUS BLD VENIPUNCTURE: CPT

## 2022-11-08 RX ORDER — CHLORHEXIDINE GLUCONATE 500 MG/1
CLOTH TOPICAL
Status: ON HOLD | COMMUNITY
End: 2022-11-14

## 2022-11-08 NOTE — DISCHARGE INSTRUCTIONS
Take the following medications the morning of surgery: NONE    ARRIVAL TIME IS 1100 ON 11/14/2022      If you are on prescription narcotic pain medication to control your pain you may also take that medication the morning of surgery.    General Instructions:  Do not eat solid food after midnight the night before surgery.  You may drink clear liquids day of surgery but must stop at least one hour before your hospital arrival time.  It is beneficial for you to have a clear drink that contains carbohydrates the day of surgery.  We suggest a 12 to 20 ounce bottle of Gatorade or Powerade for non-diabetic patients or a 12 to 20 ounce bottle of G2 or Powerade Zero for diabetic patients. (Pediatric patients, are not advised to drink a 12 to 20 ounce carbohydrate drink)    Clear liquids are liquids you can see through.  Nothing red in color.     Plain water                               Sports drinks  Sodas                                   Gelatin (Jell-O)  Fruit juices without pulp such as white grape juice and apple juice  Popsicles that contain no fruit or yogurt  Tea or coffee (no cream or milk added)  Gatorade / Powerade  G2 / Powerade Zero    Patients who avoid smoking, chewing tobacco and alcohol for 4 weeks prior to surgery have a reduced risk of post-operative complications.  Quit smoking as many days before surgery as you can.  Do not smoke, use chewing tobacco or drink alcohol the day of surgery.   If applicable bring your C-PAP/ BI-PAP machine.  Bring any papers given to you in the doctor’s office.  Wear clean comfortable clothes.  Do not wear contact lenses, false eyelashes or make-up.  Bring a case for your glasses.   Bring crutches or walker if applicable.  Remove all piercings.  Leave jewelry and any other valuables at home.  Hair extensions with metal clips must be removed prior to surgery.  The Pre-Admission Testing nurse will instruct you to bring medications if unable to obtain an accurate list in  Pre-Admission Testing.          Preventing a Surgical Site Infection:  For 2 to 3 days before surgery, avoid shaving with a razor because the razor can irritate skin and make it easier to develop an infection.    Any areas of open skin can increase the risk of a post-operative wound infection by allowing bacteria to enter and travel throughout the body.  Notify your surgeon if you have any skin wounds / rashes even if it is not near the expected surgical site.  The area will need assessed to determine if surgery should be delayed until it is healed.  The night prior to surgery shower using a fresh bar of anti-bacterial soap (such as Dial) and clean washcloth.  Sleep in a clean bed with clean clothing.  Do not allow pets to sleep with you.  Shower on the morning of surgery using a fresh bar of anti-bacterial soap (such as Dial) and clean washcloth.  Dry with a clean towel and dress in clean clothing.  Ask your surgeon if you will be receiving antibiotics prior to surgery.  Make sure you, your family, and all healthcare providers clean their hands with soap and water or an alcohol based hand  before caring for you or your wound.      CHLORHEXIDINE CLOTH INSTRUCTIONS  The morning of surgery follow these instructions using the Chlorhexidine cloths you've been given.  These steps reduce bacteria on the body.  Do not use the cloths near your eyes, ears mouth, genitalia or on open wounds.  Throw the cloths away after use but do not try to flush them down a toilet.      Open and remove one cloth at a time from the package.    Leave the cloth unfolded and begin the bathing.  Massage the skin with the cloths using gentle pressure to remove bacteria.  Do not scrub harshly.   Follow the steps below with one 2% CHG cloth per area (6 total cloths).  One cloth for neck, shoulders and chest.  One cloth for both arms, hands, fingers and underarms (do underarms last).  One cloth for the abdomen followed by groin.  One cloth  for right leg and foot including between the toes.  One cloth for left leg and foot including between the toes.  The last cloth is to be used for the back of the neck, back and buttocks.    Allow the CHG to air dry 3 minutes on the skin which will give it time to work and decrease the chance of irritation.  The skin may feel sticky until it is dry.  Do not rinse with water or any other liquid or you will lose the beneficial effects of the CHG.  If mild skin irritation occurs, do rinse the skin to remove the CHG.  Report this to the nurse at time of admission.  Do not apply lotions, creams, ointments, deodorants or perfumes after using the clothes. Dress in clean clothes before coming to the hospital.   Day of surgery: ARRIVAL TIME IS 1100 AM ON 11/14/2022  Your arrival time is approximately two hours before your scheduled surgery time.  Upon arrival, a Pre-op nurse and Anesthesiologist will review your health history, obtain vital signs, and answer questions you may have.  The only belongings needed at this time will be a list of your home medications and if applicable your C-PAP/BI-PAP machine.  A Pre-op nurse will start an IV and you may receive medication in preparation for surgery, including something to help you relax.     Please be aware that surgery does come with discomfort.  We want to make every effort to control your discomfort so please discuss any uncontrolled symptoms with your nurse.   Your doctor will most likely have prescribed pain medications.      If you are going home after surgery you will receive individualized written care instructions before being discharged.  A responsible adult must drive you to and from the hospital on the day of your surgery and stay with you for 24 hours.  Discharge prescriptions can be filled by the hospital pharmacy during regular pharmacy hours.  If you are having surgery late in the day/evening your prescription may be e-prescribed to your pharmacy.  Please verify  your pharmacy hours or chose a 24 hour pharmacy to avoid not having access to your prescription because your pharmacy has closed for the day.    If you are staying overnight following surgery, you will be transported to your hospital room following the recovery period.  [facility] has all private rooms.    If you have any questions please call Pre-Admission Testing at [phone].  Deductibles and co-payments are collected on the day of service. Please be prepared to pay the required co-pay, deductible or deposit on the day of service as defined by your plan.    Call your surgeon immediately if you experience any of the following symptoms:  Sore Throat  Shortness of Breath or difficulty breathing  Cough  Chills  Body soreness or muscle pain  Headache  Fever  New loss of taste or smell  Do not arrive for your surgery ill.  Your procedure will need to be rescheduled to another time.  You will need to call your physician before the day of surgery to avoid any unnecessary exposure to hospital staff as well as other patients.

## 2022-11-10 DIAGNOSIS — C20 ADENOCARCINOMA OF RECTUM: Chronic | ICD-10-CM

## 2022-11-10 RX ORDER — ONDANSETRON HYDROCHLORIDE 8 MG/1
8 TABLET, FILM COATED ORAL 3 TIMES DAILY PRN
Qty: 60 TABLET | Refills: 1 | Status: SHIPPED | OUTPATIENT
Start: 2022-11-10 | End: 2023-01-09

## 2022-11-14 ENCOUNTER — ANESTHESIA EVENT (OUTPATIENT)
Dept: PERIOP | Facility: HOSPITAL | Age: 42
End: 2022-11-14

## 2022-11-14 ENCOUNTER — HOSPITAL ENCOUNTER (INPATIENT)
Facility: HOSPITAL | Age: 42
LOS: 3 days | Discharge: HOME | End: 2022-11-17
Attending: COLON & RECTAL SURGERY | Admitting: COLON & RECTAL SURGERY

## 2022-11-14 ENCOUNTER — ANESTHESIA (OUTPATIENT)
Dept: PERIOP | Facility: HOSPITAL | Age: 42
End: 2022-11-14

## 2022-11-14 DIAGNOSIS — Z85.048 HISTORY OF RECTAL CANCER: ICD-10-CM

## 2022-11-14 LAB
B-HCG UR QL: NEGATIVE
EXPIRATION DATE: NORMAL
INTERNAL NEGATIVE CONTROL: NEGATIVE
INTERNAL POSITIVE CONTROL: POSITIVE
Lab: NORMAL

## 2022-11-14 PROCEDURE — 25010000002 ENOXAPARIN PER 10 MG: Performed by: COLON & RECTAL SURGERY

## 2022-11-14 PROCEDURE — 25010000002 ONDANSETRON PER 1 MG: Performed by: NURSE ANESTHETIST, CERTIFIED REGISTERED

## 2022-11-14 PROCEDURE — 25010000002 DEXAMETHASONE SODIUM PHOSPHATE 20 MG/5ML SOLUTION: Performed by: NURSE ANESTHETIST, CERTIFIED REGISTERED

## 2022-11-14 PROCEDURE — 44625 REPAIR BOWEL OPENING: CPT | Performed by: COLON & RECTAL SURGERY

## 2022-11-14 PROCEDURE — 25010000002 FENTANYL CITRATE (PF) 50 MCG/ML SOLUTION: Performed by: NURSE ANESTHETIST, CERTIFIED REGISTERED

## 2022-11-14 PROCEDURE — 25010000002 NEOSTIGMINE 5 MG/10ML SOLUTION: Performed by: ANESTHESIOLOGY

## 2022-11-14 PROCEDURE — 81025 URINE PREGNANCY TEST: CPT | Performed by: COLON & RECTAL SURGERY

## 2022-11-14 PROCEDURE — 25010000002 CEFAZOLIN IN DEXTROSE 2-4 GM/100ML-% SOLUTION: Performed by: COLON & RECTAL SURGERY

## 2022-11-14 PROCEDURE — 25010000002 HYDRALAZINE PER 20 MG: Performed by: ANESTHESIOLOGY

## 2022-11-14 PROCEDURE — 25010000002 PROPOFOL 10 MG/ML EMULSION: Performed by: NURSE ANESTHETIST, CERTIFIED REGISTERED

## 2022-11-14 PROCEDURE — 88304 TISSUE EXAM BY PATHOLOGIST: CPT | Performed by: COLON & RECTAL SURGERY

## 2022-11-14 PROCEDURE — 25010000002 HYDROMORPHONE PER 4 MG: Performed by: NURSE ANESTHETIST, CERTIFIED REGISTERED

## 2022-11-14 PROCEDURE — 25010000002 MIDAZOLAM PER 1 MG: Performed by: ANESTHESIOLOGY

## 2022-11-14 PROCEDURE — 0DBB0ZZ EXCISION OF ILEUM, OPEN APPROACH: ICD-10-PCS | Performed by: COLON & RECTAL SURGERY

## 2022-11-14 PROCEDURE — 25010000002 HYDROMORPHONE 1 MG/ML SOLUTION: Performed by: NURSE ANESTHETIST, CERTIFIED REGISTERED

## 2022-11-14 PROCEDURE — 44625 REPAIR BOWEL OPENING: CPT | Performed by: PHYSICIAN ASSISTANT

## 2022-11-14 DEVICE — PROXIMATE LINEAR CUTTER RELOAD, BLUE, 75MM
Type: IMPLANTABLE DEVICE | Site: ABDOMEN | Status: FUNCTIONAL
Brand: PROXIMATE

## 2022-11-14 DEVICE — PROXIMATE RELOADABLE LINEAR STAPLER
Type: IMPLANTABLE DEVICE | Site: ABDOMEN | Status: FUNCTIONAL
Brand: PROXIMATE

## 2022-11-14 DEVICE — SEALANT WND FIBRIN TISSEEL PREFIL/SYR/PRIMAFZ 10ML: Type: IMPLANTABLE DEVICE | Site: ABDOMEN | Status: FUNCTIONAL

## 2022-11-14 DEVICE — PROXIMATE RELOADABLE LINEAR CUTTER WITH SAFETY LOCK-OUT, 75MM
Type: IMPLANTABLE DEVICE | Site: ABDOMEN | Status: FUNCTIONAL
Brand: PROXIMATE

## 2022-11-14 RX ORDER — ENOXAPARIN SODIUM 100 MG/ML
1 INJECTION SUBCUTANEOUS EVERY 12 HOURS SCHEDULED
Status: DISCONTINUED | OUTPATIENT
Start: 2022-11-14 | End: 2022-11-17 | Stop reason: HOSPADM

## 2022-11-14 RX ORDER — HYDRALAZINE HYDROCHLORIDE 20 MG/ML
5 INJECTION INTRAMUSCULAR; INTRAVENOUS
Status: DISCONTINUED | OUTPATIENT
Start: 2022-11-14 | End: 2022-11-14 | Stop reason: HOSPADM

## 2022-11-14 RX ORDER — NALOXONE HCL 0.4 MG/ML
0.2 VIAL (ML) INJECTION AS NEEDED
Status: DISCONTINUED | OUTPATIENT
Start: 2022-11-14 | End: 2022-11-14 | Stop reason: HOSPADM

## 2022-11-14 RX ORDER — HYDROCODONE BITARTRATE AND ACETAMINOPHEN 7.5; 325 MG/1; MG/1
1 TABLET ORAL ONCE AS NEEDED
Status: COMPLETED | OUTPATIENT
Start: 2022-11-14 | End: 2022-11-14

## 2022-11-14 RX ORDER — HYDRALAZINE HYDROCHLORIDE 20 MG/ML
INJECTION INTRAMUSCULAR; INTRAVENOUS AS NEEDED
Status: DISCONTINUED | OUTPATIENT
Start: 2022-11-14 | End: 2022-11-14 | Stop reason: SURG

## 2022-11-14 RX ORDER — SODIUM CHLORIDE, SODIUM LACTATE, POTASSIUM CHLORIDE, CALCIUM CHLORIDE 600; 310; 30; 20 MG/100ML; MG/100ML; MG/100ML; MG/100ML
INJECTION, SOLUTION INTRAVENOUS CONTINUOUS PRN
Status: DISCONTINUED | OUTPATIENT
Start: 2022-11-14 | End: 2022-11-14 | Stop reason: SURG

## 2022-11-14 RX ORDER — CEFAZOLIN SODIUM 2 G/100ML
2 INJECTION, SOLUTION INTRAVENOUS ONCE
Status: COMPLETED | OUTPATIENT
Start: 2022-11-14 | End: 2022-11-14

## 2022-11-14 RX ORDER — GABAPENTIN 300 MG/1
600 CAPSULE ORAL ONCE
Status: COMPLETED | OUTPATIENT
Start: 2022-11-14 | End: 2022-11-14

## 2022-11-14 RX ORDER — PROMETHAZINE HYDROCHLORIDE 25 MG/1
25 TABLET ORAL ONCE AS NEEDED
Status: DISCONTINUED | OUTPATIENT
Start: 2022-11-14 | End: 2022-11-14 | Stop reason: HOSPADM

## 2022-11-14 RX ORDER — ALVIMOPAN 12 MG/1
12 CAPSULE ORAL 2 TIMES DAILY
Status: DISCONTINUED | OUTPATIENT
Start: 2022-11-15 | End: 2022-11-16

## 2022-11-14 RX ORDER — LABETALOL HYDROCHLORIDE 5 MG/ML
5 INJECTION, SOLUTION INTRAVENOUS
Status: DISCONTINUED | OUTPATIENT
Start: 2022-11-14 | End: 2022-11-14 | Stop reason: HOSPADM

## 2022-11-14 RX ORDER — ONDANSETRON 2 MG/ML
INJECTION INTRAMUSCULAR; INTRAVENOUS AS NEEDED
Status: DISCONTINUED | OUTPATIENT
Start: 2022-11-14 | End: 2022-11-14 | Stop reason: SURG

## 2022-11-14 RX ORDER — GABAPENTIN 400 MG/1
400 CAPSULE ORAL EVERY 8 HOURS SCHEDULED
Status: DISCONTINUED | OUTPATIENT
Start: 2022-11-14 | End: 2022-11-17 | Stop reason: HOSPADM

## 2022-11-14 RX ORDER — HYDROMORPHONE HYDROCHLORIDE 1 MG/ML
0.25 INJECTION, SOLUTION INTRAMUSCULAR; INTRAVENOUS; SUBCUTANEOUS
Status: DISCONTINUED | OUTPATIENT
Start: 2022-11-14 | End: 2022-11-17 | Stop reason: HOSPADM

## 2022-11-14 RX ORDER — DEXAMETHASONE SODIUM PHOSPHATE 4 MG/ML
INJECTION, SOLUTION INTRA-ARTICULAR; INTRALESIONAL; INTRAMUSCULAR; INTRAVENOUS; SOFT TISSUE AS NEEDED
Status: DISCONTINUED | OUTPATIENT
Start: 2022-11-14 | End: 2022-11-14 | Stop reason: SURG

## 2022-11-14 RX ORDER — CELECOXIB 200 MG/1
200 CAPSULE ORAL ONCE
Status: COMPLETED | OUTPATIENT
Start: 2022-11-14 | End: 2022-11-14

## 2022-11-14 RX ORDER — GLYCOPYRROLATE 0.2 MG/ML
INJECTION INTRAMUSCULAR; INTRAVENOUS AS NEEDED
Status: DISCONTINUED | OUTPATIENT
Start: 2022-11-14 | End: 2022-11-14 | Stop reason: SURG

## 2022-11-14 RX ORDER — LIDOCAINE HYDROCHLORIDE 20 MG/ML
INJECTION, SOLUTION EPIDURAL; INFILTRATION; INTRACAUDAL; PERINEURAL AS NEEDED
Status: DISCONTINUED | OUTPATIENT
Start: 2022-11-14 | End: 2022-11-14 | Stop reason: SURG

## 2022-11-14 RX ORDER — ALVIMOPAN 12 MG/1
12 CAPSULE ORAL ONCE
Status: COMPLETED | OUTPATIENT
Start: 2022-11-14 | End: 2022-11-14

## 2022-11-14 RX ORDER — BUPIVACAINE HYDROCHLORIDE AND EPINEPHRINE 2.5; 5 MG/ML; UG/ML
INJECTION, SOLUTION EPIDURAL; INFILTRATION; INTRACAUDAL; PERINEURAL AS NEEDED
Status: DISCONTINUED | OUTPATIENT
Start: 2022-11-14 | End: 2022-11-14 | Stop reason: HOSPADM

## 2022-11-14 RX ORDER — INDOCYANINE GREEN AND WATER 25 MG
KIT INJECTION AS NEEDED
Status: DISCONTINUED | OUTPATIENT
Start: 2022-11-14 | End: 2022-11-14 | Stop reason: SURG

## 2022-11-14 RX ORDER — DIPHENHYDRAMINE HCL 25 MG
25 CAPSULE ORAL
Status: DISCONTINUED | OUTPATIENT
Start: 2022-11-14 | End: 2022-11-14 | Stop reason: HOSPADM

## 2022-11-14 RX ORDER — SODIUM CHLORIDE 0.9 % (FLUSH) 0.9 %
10 SYRINGE (ML) INJECTION AS NEEDED
Status: DISCONTINUED | OUTPATIENT
Start: 2022-11-14 | End: 2022-11-14 | Stop reason: HOSPADM

## 2022-11-14 RX ORDER — HYDROMORPHONE HYDROCHLORIDE 1 MG/ML
0.5 INJECTION, SOLUTION INTRAMUSCULAR; INTRAVENOUS; SUBCUTANEOUS
Status: DISCONTINUED | OUTPATIENT
Start: 2022-11-14 | End: 2022-11-14 | Stop reason: HOSPADM

## 2022-11-14 RX ORDER — FENTANYL CITRATE 0.05 MG/ML
INJECTION, SOLUTION INTRAMUSCULAR; INTRAVENOUS AS NEEDED
Status: DISCONTINUED | OUTPATIENT
Start: 2022-11-14 | End: 2022-11-14 | Stop reason: SURG

## 2022-11-14 RX ORDER — EPHEDRINE SULFATE 50 MG/ML
5 INJECTION, SOLUTION INTRAVENOUS ONCE AS NEEDED
Status: DISCONTINUED | OUTPATIENT
Start: 2022-11-14 | End: 2022-11-14 | Stop reason: HOSPADM

## 2022-11-14 RX ORDER — SODIUM CHLORIDE, SODIUM LACTATE, POTASSIUM CHLORIDE, CALCIUM CHLORIDE 600; 310; 30; 20 MG/100ML; MG/100ML; MG/100ML; MG/100ML
50 INJECTION, SOLUTION INTRAVENOUS CONTINUOUS
Status: DISCONTINUED | OUTPATIENT
Start: 2022-11-14 | End: 2022-11-15

## 2022-11-14 RX ORDER — FAMOTIDINE 20 MG/1
20 TABLET, FILM COATED ORAL 2 TIMES DAILY
Status: DISCONTINUED | OUTPATIENT
Start: 2022-11-14 | End: 2022-11-17 | Stop reason: HOSPADM

## 2022-11-14 RX ORDER — ROCURONIUM BROMIDE 10 MG/ML
INJECTION, SOLUTION INTRAVENOUS AS NEEDED
Status: DISCONTINUED | OUTPATIENT
Start: 2022-11-14 | End: 2022-11-14 | Stop reason: SURG

## 2022-11-14 RX ORDER — NALOXONE HCL 0.4 MG/ML
0.4 VIAL (ML) INJECTION
Status: DISCONTINUED | OUTPATIENT
Start: 2022-11-14 | End: 2022-11-17 | Stop reason: HOSPADM

## 2022-11-14 RX ORDER — LABETALOL HYDROCHLORIDE 5 MG/ML
INJECTION, SOLUTION INTRAVENOUS AS NEEDED
Status: DISCONTINUED | OUTPATIENT
Start: 2022-11-14 | End: 2022-11-14 | Stop reason: SURG

## 2022-11-14 RX ORDER — FLUMAZENIL 0.1 MG/ML
0.2 INJECTION INTRAVENOUS AS NEEDED
Status: DISCONTINUED | OUTPATIENT
Start: 2022-11-14 | End: 2022-11-14 | Stop reason: HOSPADM

## 2022-11-14 RX ORDER — PROPOFOL 10 MG/ML
VIAL (ML) INTRAVENOUS AS NEEDED
Status: DISCONTINUED | OUTPATIENT
Start: 2022-11-14 | End: 2022-11-14 | Stop reason: SURG

## 2022-11-14 RX ORDER — ACETAMINOPHEN 500 MG
1000 TABLET ORAL ONCE
Status: COMPLETED | OUTPATIENT
Start: 2022-11-14 | End: 2022-11-14

## 2022-11-14 RX ORDER — ACETAMINOPHEN 500 MG
1000 TABLET ORAL EVERY 6 HOURS
Status: DISCONTINUED | OUTPATIENT
Start: 2022-11-14 | End: 2022-11-17 | Stop reason: HOSPADM

## 2022-11-14 RX ORDER — CELECOXIB 200 MG/1
200 CAPSULE ORAL EVERY 12 HOURS SCHEDULED
Status: DISCONTINUED | OUTPATIENT
Start: 2022-11-14 | End: 2022-11-17 | Stop reason: HOSPADM

## 2022-11-14 RX ORDER — SCOLOPAMINE TRANSDERMAL SYSTEM 1 MG/1
1 PATCH, EXTENDED RELEASE TRANSDERMAL CONTINUOUS
Status: DISPENSED | OUTPATIENT
Start: 2022-11-14 | End: 2022-11-17

## 2022-11-14 RX ORDER — DIPHENHYDRAMINE HYDROCHLORIDE 50 MG/ML
12.5 INJECTION INTRAMUSCULAR; INTRAVENOUS
Status: DISCONTINUED | OUTPATIENT
Start: 2022-11-14 | End: 2022-11-14 | Stop reason: HOSPADM

## 2022-11-14 RX ORDER — SODIUM CHLORIDE, SODIUM LACTATE, POTASSIUM CHLORIDE, CALCIUM CHLORIDE 600; 310; 30; 20 MG/100ML; MG/100ML; MG/100ML; MG/100ML
9 INJECTION, SOLUTION INTRAVENOUS CONTINUOUS PRN
Status: DISCONTINUED | OUTPATIENT
Start: 2022-11-14 | End: 2022-11-14 | Stop reason: HOSPADM

## 2022-11-14 RX ORDER — NITROGLYCERIN 0.4 MG/1
0.4 TABLET SUBLINGUAL
Status: DISCONTINUED | OUTPATIENT
Start: 2022-11-14 | End: 2022-11-17 | Stop reason: HOSPADM

## 2022-11-14 RX ORDER — OXYCODONE AND ACETAMINOPHEN 7.5; 325 MG/1; MG/1
1 TABLET ORAL EVERY 4 HOURS PRN
Status: DISCONTINUED | OUTPATIENT
Start: 2022-11-14 | End: 2022-11-14 | Stop reason: HOSPADM

## 2022-11-14 RX ORDER — ONDANSETRON 2 MG/ML
4 INJECTION INTRAMUSCULAR; INTRAVENOUS EVERY 6 HOURS PRN
Status: DISCONTINUED | OUTPATIENT
Start: 2022-11-14 | End: 2022-11-17 | Stop reason: HOSPADM

## 2022-11-14 RX ORDER — PROMETHAZINE HYDROCHLORIDE 25 MG/1
25 SUPPOSITORY RECTAL ONCE AS NEEDED
Status: DISCONTINUED | OUTPATIENT
Start: 2022-11-14 | End: 2022-11-14 | Stop reason: HOSPADM

## 2022-11-14 RX ORDER — CETIRIZINE HYDROCHLORIDE 10 MG/1
10 TABLET ORAL DAILY
Status: DISCONTINUED | OUTPATIENT
Start: 2022-11-14 | End: 2022-11-17 | Stop reason: HOSPADM

## 2022-11-14 RX ORDER — SODIUM CHLORIDE 0.9 % (FLUSH) 0.9 %
10 SYRINGE (ML) INJECTION EVERY 12 HOURS SCHEDULED
Status: DISCONTINUED | OUTPATIENT
Start: 2022-11-14 | End: 2022-11-14 | Stop reason: HOSPADM

## 2022-11-14 RX ORDER — NEOSTIGMINE METHYLSULFATE 0.5 MG/ML
INJECTION, SOLUTION INTRAVENOUS AS NEEDED
Status: DISCONTINUED | OUTPATIENT
Start: 2022-11-14 | End: 2022-11-14 | Stop reason: SURG

## 2022-11-14 RX ORDER — FAMOTIDINE 10 MG/ML
20 INJECTION, SOLUTION INTRAVENOUS ONCE
Status: COMPLETED | OUTPATIENT
Start: 2022-11-14 | End: 2022-11-14

## 2022-11-14 RX ORDER — METOPROLOL SUCCINATE 25 MG/1
12.5 TABLET, EXTENDED RELEASE ORAL NIGHTLY
Status: DISCONTINUED | OUTPATIENT
Start: 2022-11-14 | End: 2022-11-17 | Stop reason: HOSPADM

## 2022-11-14 RX ORDER — MAGNESIUM HYDROXIDE 1200 MG/15ML
LIQUID ORAL AS NEEDED
Status: DISCONTINUED | OUTPATIENT
Start: 2022-11-14 | End: 2022-11-14 | Stop reason: HOSPADM

## 2022-11-14 RX ORDER — METRONIDAZOLE 500 MG/100ML
500 INJECTION, SOLUTION INTRAVENOUS ONCE
Status: COMPLETED | OUTPATIENT
Start: 2022-11-14 | End: 2022-11-14

## 2022-11-14 RX ORDER — MIDAZOLAM HYDROCHLORIDE 1 MG/ML
2 INJECTION INTRAMUSCULAR; INTRAVENOUS ONCE
Status: COMPLETED | OUTPATIENT
Start: 2022-11-14 | End: 2022-11-14

## 2022-11-14 RX ORDER — MIDAZOLAM HYDROCHLORIDE 1 MG/ML
1 INJECTION INTRAMUSCULAR; INTRAVENOUS
Status: COMPLETED | OUTPATIENT
Start: 2022-11-14 | End: 2022-11-14

## 2022-11-14 RX ORDER — FENTANYL CITRATE 50 UG/ML
50 INJECTION, SOLUTION INTRAMUSCULAR; INTRAVENOUS
Status: DISCONTINUED | OUTPATIENT
Start: 2022-11-14 | End: 2022-11-14 | Stop reason: HOSPADM

## 2022-11-14 RX ORDER — LORAZEPAM 0.5 MG/1
0.5 TABLET ORAL EVERY 8 HOURS PRN
Status: DISCONTINUED | OUTPATIENT
Start: 2022-11-14 | End: 2022-11-17 | Stop reason: HOSPADM

## 2022-11-14 RX ORDER — ONDANSETRON 2 MG/ML
4 INJECTION INTRAMUSCULAR; INTRAVENOUS ONCE AS NEEDED
Status: COMPLETED | OUTPATIENT
Start: 2022-11-14 | End: 2022-11-14

## 2022-11-14 RX ADMIN — SODIUM CHLORIDE, POTASSIUM CHLORIDE, SODIUM LACTATE AND CALCIUM CHLORIDE: 600; 310; 30; 20 INJECTION, SOLUTION INTRAVENOUS at 14:51

## 2022-11-14 RX ADMIN — LIDOCAINE HYDROCHLORIDE 60 MG: 20 INJECTION, SOLUTION EPIDURAL; INFILTRATION; INTRACAUDAL; PERINEURAL at 13:37

## 2022-11-14 RX ADMIN — CELECOXIB 200 MG: 200 CAPSULE ORAL at 11:54

## 2022-11-14 RX ADMIN — SODIUM CHLORIDE, POTASSIUM CHLORIDE, SODIUM LACTATE AND CALCIUM CHLORIDE: 600; 310; 30; 20 INJECTION, SOLUTION INTRAVENOUS at 13:28

## 2022-11-14 RX ADMIN — SODIUM CHLORIDE, POTASSIUM CHLORIDE, SODIUM LACTATE AND CALCIUM CHLORIDE 50 ML/HR: 600; 310; 30; 20 INJECTION, SOLUTION INTRAVENOUS at 21:54

## 2022-11-14 RX ADMIN — FENTANYL CITRATE 50 MCG: 50 INJECTION INTRAMUSCULAR; INTRAVENOUS at 16:14

## 2022-11-14 RX ADMIN — GLYCOPYRROLATE 0.5 MCG: 1 INJECTION INTRAMUSCULAR; INTRAVENOUS at 15:36

## 2022-11-14 RX ADMIN — PROPOFOL 200 MG: 10 INJECTION, EMULSION INTRAVENOUS at 13:37

## 2022-11-14 RX ADMIN — MIDAZOLAM 1 MG: 1 INJECTION INTRAMUSCULAR; INTRAVENOUS at 12:50

## 2022-11-14 RX ADMIN — ROCURONIUM BROMIDE 20 MG: 10 INJECTION, SOLUTION INTRAVENOUS at 14:17

## 2022-11-14 RX ADMIN — MIDAZOLAM 2 MG: 1 INJECTION INTRAMUSCULAR; INTRAVENOUS at 13:23

## 2022-11-14 RX ADMIN — GABAPENTIN 400 MG: 400 CAPSULE ORAL at 21:49

## 2022-11-14 RX ADMIN — ROCURONIUM BROMIDE 50 MG: 10 INJECTION, SOLUTION INTRAVENOUS at 13:37

## 2022-11-14 RX ADMIN — METOPROLOL SUCCINATE 12.5 MG: 25 TABLET, EXTENDED RELEASE ORAL at 20:30

## 2022-11-14 RX ADMIN — CETIRIZINE HYDROCHLORIDE 10 MG: 10 TABLET ORAL at 20:30

## 2022-11-14 RX ADMIN — ACETAMINOPHEN 1000 MG: 500 TABLET ORAL at 19:46

## 2022-11-14 RX ADMIN — FAMOTIDINE 20 MG: 10 INJECTION INTRAVENOUS at 12:36

## 2022-11-14 RX ADMIN — LABETALOL HYDROCHLORIDE 10 MG: 5 INJECTION, SOLUTION INTRAVENOUS at 14:16

## 2022-11-14 RX ADMIN — INDOCYANINE GREEN AND WATER 5 MG: KIT at 15:08

## 2022-11-14 RX ADMIN — ENOXAPARIN SODIUM 100 MG: 100 INJECTION SUBCUTANEOUS at 20:31

## 2022-11-14 RX ADMIN — SODIUM CHLORIDE, POTASSIUM CHLORIDE, SODIUM LACTATE AND CALCIUM CHLORIDE 50 ML/HR: 600; 310; 30; 20 INJECTION, SOLUTION INTRAVENOUS at 19:47

## 2022-11-14 RX ADMIN — HYDROCODONE BITARTRATE AND ACETAMINOPHEN 1 TABLET: 7.5; 325 TABLET ORAL at 16:53

## 2022-11-14 RX ADMIN — FENTANYL CITRATE 50 MCG: 50 INJECTION INTRAMUSCULAR; INTRAVENOUS at 14:09

## 2022-11-14 RX ADMIN — SCOPALAMINE 1 PATCH: 1 PATCH, EXTENDED RELEASE TRANSDERMAL at 11:54

## 2022-11-14 RX ADMIN — CEFAZOLIN SODIUM 2 G: 2 INJECTION, SOLUTION INTRAVENOUS at 13:22

## 2022-11-14 RX ADMIN — LABETALOL HYDROCHLORIDE 10 MG: 5 INJECTION, SOLUTION INTRAVENOUS at 14:33

## 2022-11-14 RX ADMIN — HYDRALAZINE HYDROCHLORIDE 5 MG: 20 INJECTION, SOLUTION INTRAMUSCULAR; INTRAVENOUS at 15:33

## 2022-11-14 RX ADMIN — ONDANSETRON 4 MG: 2 INJECTION INTRAMUSCULAR; INTRAVENOUS at 16:57

## 2022-11-14 RX ADMIN — ROCURONIUM BROMIDE 10 MG: 10 INJECTION, SOLUTION INTRAVENOUS at 14:59

## 2022-11-14 RX ADMIN — ALVIMOPAN 12 MG: 12 CAPSULE ORAL at 11:54

## 2022-11-14 RX ADMIN — HYDROMORPHONE HYDROCHLORIDE 0.5 MG: 1 INJECTION, SOLUTION INTRAMUSCULAR; INTRAVENOUS; SUBCUTANEOUS at 16:20

## 2022-11-14 RX ADMIN — DEXAMETHASONE SODIUM PHOSPHATE 8 MG: 4 INJECTION, SOLUTION INTRAMUSCULAR; INTRAVENOUS at 14:20

## 2022-11-14 RX ADMIN — FAMOTIDINE 20 MG: 20 TABLET ORAL at 20:31

## 2022-11-14 RX ADMIN — HYDROMORPHONE HYDROCHLORIDE 0.5 MG: 1 INJECTION, SOLUTION INTRAMUSCULAR; INTRAVENOUS; SUBCUTANEOUS at 16:45

## 2022-11-14 RX ADMIN — NEOSTIGMINE METHYLSULFATE 2.5 MG: 0.5 INJECTION INTRAVENOUS at 15:36

## 2022-11-14 RX ADMIN — FENTANYL CITRATE 50 MCG: 50 INJECTION INTRAMUSCULAR; INTRAVENOUS at 13:37

## 2022-11-14 RX ADMIN — GABAPENTIN 600 MG: 300 CAPSULE ORAL at 11:55

## 2022-11-14 RX ADMIN — METRONIDAZOLE 500 MG: 500 INJECTION, SOLUTION INTRAVENOUS at 13:22

## 2022-11-14 RX ADMIN — CELECOXIB 200 MG: 200 CAPSULE ORAL at 21:51

## 2022-11-14 RX ADMIN — MIDAZOLAM 1 MG: 1 INJECTION INTRAMUSCULAR; INTRAVENOUS at 12:36

## 2022-11-14 RX ADMIN — ONDANSETRON 4 MG: 2 INJECTION INTRAMUSCULAR; INTRAVENOUS at 14:20

## 2022-11-14 RX ADMIN — SUGAMMADEX 200 MG: 100 INJECTION, SOLUTION INTRAVENOUS at 15:49

## 2022-11-14 RX ADMIN — HYDROMORPHONE HYDROCHLORIDE 0.5 MG: 1 INJECTION, SOLUTION INTRAMUSCULAR; INTRAVENOUS; SUBCUTANEOUS at 14:15

## 2022-11-14 RX ADMIN — INDOCYANINE GREEN AND WATER 5 MG: KIT at 14:49

## 2022-11-14 RX ADMIN — ACETAMINOPHEN 1000 MG: 500 TABLET ORAL at 11:54

## 2022-11-14 NOTE — INTERVAL H&P NOTE
H&P updated. The patient was examined and the following changes are noted:        Colonoscopy was good up to transverse colon.  There was then inspissated stool and mucus that interfered with visualization.  Anastomosis looked great.  We can proceed with ileostomy takedown.  I described with patient typical surgical time, postop recovery including the enhanced recovery protocol, pain management, and restrictions. I discussed with patient risks including but not limited to anastomotic breakdown and possible need for ostomy, bleeding, infection (interabdominal, abdominal wall or subcutaneous), pneumonia, DVT, PE, heart attack, or stroke,  the benefits, and alternatives.  The patient had opportunity to ask questions.  I answered all questions.  Patient understands and wishes to proceed with procedure.

## 2022-11-14 NOTE — ANESTHESIA PREPROCEDURE EVALUATION
Anesthesia Evaluation     Patient summary reviewed and Nursing notes reviewed   NPO Solid Status: > 6 hours  NPO Liquid Status: > 2 hours           Airway   Mallampati: I  TM distance: >3 FB  Neck ROM: full  Dental - normal exam     Pulmonary    (+) pulmonary embolism, a smoker Current,   (-) COPD, asthma, sleep apnea  Cardiovascular   Exercise tolerance: good (4-7 METS)    ECG reviewed    (+) hypertension, valvular problems/murmurs murmur,   (-) CAD, dysrhythmias, angina      Neuro/Psych  (+) numbness (peripheral neuropathy), psychiatric history Anxiety and Depression,    (-) seizures, CVA  GI/Hepatic/Renal/Endo    (+)  GERD well controlled,  renal disease stones,   (-) liver disease, diabetes, no thyroid disorder    Musculoskeletal     Abdominal    Substance History      OB/GYN          Other   chronic steroid use    history of cancer (metastatic colon ca) active      Other Comment: Hx Factor V Leiden mutation. Prior hx PE and SVC syndrome. No current clots.                 Anesthesia Plan    ASA 3     general with block       Anesthetic plan, risks, benefits, and alternatives have been provided, discussed and informed consent has been obtained with: patient.        CODE STATUS:

## 2022-11-14 NOTE — ANESTHESIA PROCEDURE NOTES
Airway  Urgency: elective    Date/Time: 11/14/2022 1:40 PM  Airway not difficult    General Information and Staff    Patient location during procedure: OR  CRNA/CAA: Margarita Pineda CRNA    Indications and Patient Condition  Indications for airway management: airway protection    Preoxygenated: yes  Mask difficulty assessment: 1 - vent by mask    Final Airway Details  Final airway type: endotracheal airway      Successful airway: ETT  Cuffed: yes   Successful intubation technique: direct laryngoscopy  Endotracheal tube insertion site: oral  Blade: Luis Antonio  Blade size: 3  ETT size (mm): 7.0  Cormack-Lehane Classification: grade I - full view of glottis  Placement verified by: chest auscultation and capnometry   Cuff volume (mL): 6  Measured from: lips  ETT/EBT  to lips (cm): 20  Number of attempts at approach: 1  Assessment: lips, teeth, and gum same as pre-op and atraumatic intubation    Additional Comments  Smooth IV induction. Trachea intubated. Cuff up. Ett secured. BEBS. Dentition intact without injury.

## 2022-11-14 NOTE — OP NOTE
Surgeon: Padmaja Ye MD    Surgical  Assistant: Lurdes Tucker PA-C     Preoperative diagnosis: History of rectal cancer [Z85.048]    Post-Op Diagnosis Codes:     * History of rectal cancer [Z85.048]    Procedure: ILEOSTOMY TAKEDOWN, * Panel 2 does not exist *    Estimated Blood Loss: 100ml    Specimens:   Specimens     ID Source Type Tests Collected By Collected At Frozen?    A Ostomy Tissue · TISSUE PATHOLOGY EXAM   Padmaja Ye MD 11/14/22 1517 No    Description: ILEOSTOMY         Order Name Source Comment Collection Info Order Time   TISSUE PATHOLOGY EXAM Ostomy  Collected By: Padmaja Ye MD 11/14/2022  3:26 PM     Release to patient   Routine Release            Indication:  Carole Weaver is a 42 y.o. female who comes in with History of rectal cancer  Patient understands risks, benefits,and alternatives wishes to proceed.      Procedure Details:  Patient was brought to the operating room, SCDs in place, antibiotics infused. After general anesthesia was achieved, a TAP block was done. Patient was prepped and draped in sterile fashion. Then, 0.25% Marcaine with epinephrine was used as local infiltration. First, I made an incision at the mucocutaneous junction and it was carried down through the soft tissue. Adhesions were taken down sharply.  The distal end was very atrophic and all of the ileum was very stuck to the subcu tissue.  I used sharp dissection to get down to the fascia.  I was finally able to get into the abdominal cavity.  I made sure that there were all the adhesions were taken down off the abdominal wall.  The distal end of the ileum when I tried to dilate it with a hemostat in order to get the stapler into it was not amenable.  I had to go 4 to 5 cm more distal.  I felt some inspissated stool in the distal ileum and I could feel the cecum.  I tried to milk the stool farther into the colon.  This was carried down all the way to the level of the fascia. Then I used a blue load of  a MARIALUISA stapler to staple across the proximal and then the distal part of the bowel. I did not use most of the small intestine that was at the ileostomy in the abdominal wall because of all the dissection . I took the mesentery with the EnSeal and then any bleeding was controlled with 2-0 Vicryl. I did enterotomy in both the proximal and distal bowel and then used a 75 MARIALUISA to make the common channel, then a TX blue load 60 mm to go across the common enterotomy. I put Tisseel around all the staple lines, put it back into the abdomen, closed the abdominal wall with #1 PDS in interrupted fashion. Irrigated out the subcutaneous tissue and then placed Kerlix as dressing. Laps, instruments, and needle counts were correct.  Patient was then taken to recovery.         Assistant: Lurdes Tucker PA-C was responsible for performing the following activities: Retraction, Suction, Irrigation, Suturing, Closing and Placing Dressing and their skilled assistance was necessary for the success of this case

## 2022-11-14 NOTE — ANESTHESIA POSTPROCEDURE EVALUATION
Patient: Carole Weaver    Procedure Summary     Date: 11/14/22 Room / Location: [facility] OR 09 / [facility] MAIN OR    Anesthesia Start: 1328 Anesthesia Stop: 1557    Procedure: ILEOSTOMY TAKEDOWN Diagnosis:       History of rectal cancer      (History of rectal cancer [Z85.048])    Surgeons: Padmaja Ye MD Provider: Narayan Aguero MD    Anesthesia Type: general with block ASA Status: 3          Anesthesia Type: general with block    Vitals  Vitals Value Taken Time   /93 11/14/22 1646   Temp 36.6 °C (97.9 °F) 11/14/22 1655   Pulse 78 11/14/22 1657   Resp 16 11/14/22 1554   SpO2 94 % 11/14/22 1657   Vitals shown include unvalidated device data.        Post Anesthesia Care and Evaluation    Patient location during evaluation: bedside  Patient participation: complete - patient participated  Level of consciousness: awake  Pain management: adequate    Airway patency: patent  Anesthetic complications: No anesthetic complications  PONV Status: none  Cardiovascular status: acceptable  Respiratory status: acceptable  Hydration status: acceptable  Post Neuraxial Block status: Motor and sensory function returned to baseline

## 2022-11-15 LAB
ANION GAP SERPL CALCULATED.3IONS-SCNC: 10.5 MMOL/L (ref 5–15)
APTT PPP: 29 SECONDS (ref 22.7–35.4)
BASOPHILS # BLD AUTO: 0.01 10*3/MM3 (ref 0–0.2)
BASOPHILS NFR BLD AUTO: 0.1 % (ref 0–1.5)
BUN SERPL-MCNC: 11 MG/DL (ref 6–20)
BUN/CREAT SERPL: 15.3 (ref 7–25)
CALCIUM SPEC-SCNC: 9.1 MG/DL (ref 8.6–10.5)
CHLORIDE SERPL-SCNC: 106 MMOL/L (ref 98–107)
CO2 SERPL-SCNC: 19.5 MMOL/L (ref 22–29)
CREAT SERPL-MCNC: 0.72 MG/DL (ref 0.57–1)
DEPRECATED RDW RBC AUTO: 42.2 FL (ref 37–54)
EGFRCR SERPLBLD CKD-EPI 2021: 106.5 ML/MIN/1.73
EOSINOPHIL # BLD AUTO: 0 10*3/MM3 (ref 0–0.4)
EOSINOPHIL NFR BLD AUTO: 0 % (ref 0.3–6.2)
ERYTHROCYTE [DISTWIDTH] IN BLOOD BY AUTOMATED COUNT: 12.2 % (ref 12.3–15.4)
GLUCOSE SERPL-MCNC: 120 MG/DL (ref 65–99)
HCT VFR BLD AUTO: 37.8 % (ref 34–46.6)
HGB BLD-MCNC: 12.8 G/DL (ref 12–15.9)
IMM GRANULOCYTES # BLD AUTO: 0.04 10*3/MM3 (ref 0–0.05)
IMM GRANULOCYTES NFR BLD AUTO: 0.3 % (ref 0–0.5)
INR PPP: 1.1 (ref 0.9–1.1)
LYMPHOCYTES # BLD AUTO: 0.86 10*3/MM3 (ref 0.7–3.1)
LYMPHOCYTES NFR BLD AUTO: 6.9 % (ref 19.6–45.3)
MAGNESIUM SERPL-MCNC: 2 MG/DL (ref 1.6–2.6)
MCH RBC QN AUTO: 32 PG (ref 26.6–33)
MCHC RBC AUTO-ENTMCNC: 33.9 G/DL (ref 31.5–35.7)
MCV RBC AUTO: 94.5 FL (ref 79–97)
MONOCYTES # BLD AUTO: 0.6 10*3/MM3 (ref 0.1–0.9)
MONOCYTES NFR BLD AUTO: 4.8 % (ref 5–12)
NEUTROPHILS NFR BLD AUTO: 10.95 10*3/MM3 (ref 1.7–7)
NEUTROPHILS NFR BLD AUTO: 87.9 % (ref 42.7–76)
NRBC BLD AUTO-RTO: 0 /100 WBC (ref 0–0.2)
PLATELET # BLD AUTO: 219 10*3/MM3 (ref 140–450)
PMV BLD AUTO: 8.9 FL (ref 6–12)
POTASSIUM SERPL-SCNC: 4.3 MMOL/L (ref 3.5–5.2)
PROTHROMBIN TIME: 14.3 SECONDS (ref 11.7–14.2)
RBC # BLD AUTO: 4 10*6/MM3 (ref 3.77–5.28)
SODIUM SERPL-SCNC: 136 MMOL/L (ref 136–145)
WBC NRBC COR # BLD: 12.46 10*3/MM3 (ref 3.4–10.8)

## 2022-11-15 PROCEDURE — 85025 COMPLETE CBC W/AUTO DIFF WBC: CPT | Performed by: COLON & RECTAL SURGERY

## 2022-11-15 PROCEDURE — 99024 POSTOP FOLLOW-UP VISIT: CPT | Performed by: PHYSICIAN ASSISTANT

## 2022-11-15 PROCEDURE — 25010000002 ENOXAPARIN PER 10 MG: Performed by: COLON & RECTAL SURGERY

## 2022-11-15 PROCEDURE — 80048 BASIC METABOLIC PNL TOTAL CA: CPT | Performed by: COLON & RECTAL SURGERY

## 2022-11-15 PROCEDURE — 83735 ASSAY OF MAGNESIUM: CPT | Performed by: COLON & RECTAL SURGERY

## 2022-11-15 PROCEDURE — 25010000002 HYDROMORPHONE PER 4 MG: Performed by: COLON & RECTAL SURGERY

## 2022-11-15 PROCEDURE — 85730 THROMBOPLASTIN TIME PARTIAL: CPT | Performed by: COLON & RECTAL SURGERY

## 2022-11-15 PROCEDURE — 85610 PROTHROMBIN TIME: CPT | Performed by: COLON & RECTAL SURGERY

## 2022-11-15 RX ORDER — METOPROLOL SUCCINATE 25 MG/1
12.5 TABLET, EXTENDED RELEASE ORAL NIGHTLY
Qty: 45 TABLET | Refills: 3 | Status: SHIPPED | OUTPATIENT
Start: 2022-11-15

## 2022-11-15 RX ADMIN — ALVIMOPAN 12 MG: 12 CAPSULE ORAL at 21:18

## 2022-11-15 RX ADMIN — ALVIMOPAN 12 MG: 12 CAPSULE ORAL at 09:07

## 2022-11-15 RX ADMIN — ACETAMINOPHEN 1000 MG: 500 TABLET ORAL at 18:31

## 2022-11-15 RX ADMIN — GABAPENTIN 400 MG: 400 CAPSULE ORAL at 14:01

## 2022-11-15 RX ADMIN — HYDROMORPHONE HYDROCHLORIDE 0.25 MG: 1 INJECTION, SOLUTION INTRAMUSCULAR; INTRAVENOUS; SUBCUTANEOUS at 09:26

## 2022-11-15 RX ADMIN — CETIRIZINE HYDROCHLORIDE 10 MG: 10 TABLET ORAL at 09:07

## 2022-11-15 RX ADMIN — FAMOTIDINE 20 MG: 20 TABLET ORAL at 21:18

## 2022-11-15 RX ADMIN — GABAPENTIN 400 MG: 400 CAPSULE ORAL at 21:18

## 2022-11-15 RX ADMIN — ACETAMINOPHEN 1000 MG: 500 TABLET ORAL at 00:38

## 2022-11-15 RX ADMIN — ENOXAPARIN SODIUM 100 MG: 100 INJECTION SUBCUTANEOUS at 21:17

## 2022-11-15 RX ADMIN — GABAPENTIN 400 MG: 400 CAPSULE ORAL at 05:39

## 2022-11-15 RX ADMIN — CELECOXIB 200 MG: 200 CAPSULE ORAL at 21:17

## 2022-11-15 RX ADMIN — CELECOXIB 200 MG: 200 CAPSULE ORAL at 09:07

## 2022-11-15 RX ADMIN — METOPROLOL SUCCINATE 12.5 MG: 25 TABLET, EXTENDED RELEASE ORAL at 21:17

## 2022-11-15 RX ADMIN — FAMOTIDINE 20 MG: 20 TABLET ORAL at 09:07

## 2022-11-15 RX ADMIN — ENOXAPARIN SODIUM 100 MG: 100 INJECTION SUBCUTANEOUS at 09:07

## 2022-11-15 RX ADMIN — ACETAMINOPHEN 1000 MG: 500 TABLET ORAL at 12:09

## 2022-11-15 RX ADMIN — HYDROMORPHONE HYDROCHLORIDE 0.25 MG: 1 INJECTION, SOLUTION INTRAMUSCULAR; INTRAVENOUS; SUBCUTANEOUS at 00:44

## 2022-11-15 NOTE — CASE MANAGEMENT/SOCIAL WORK
Continued Stay Note  [facility]     Patient Name: Carole Weaver  MRN: [id number]  Today's Date: 11/15/2022    Admit Date: 11/14/2022    Plan: Home with family and [facility]. [facility] to provide wound vac and will deliver to room 11/16. Family to transport.   Discharge Plan     Row Name 11/15/22 1650       Plan    Plan Home with family and [facility]. [facility] to provide wound vac and will deliver to room 11/16. Family to transport.    Patient/Family in Agreement with Plan yes    Plan Comments Met with pt. at bedside. Explained roll of . Face sheet and pharmacy verified. Pt lives with Justus Weaver, Spouse, [phone] and 24 yr. old son. There is one step to enter home.  DME equipment includes Ostomy supplies. Plan is for pt. to D/C with wound Vac. Notified [facility] of need for wound vac tomorrow 11/16. [facility] Pt is independent with ADLs. Pt has never been to Rehab but has used [facility] in the past. Pt requests [facility]  see her at D/C for wound vac dressing changes. Referrals placed to [facility] and they will see at D/C.  Pt’s PCP is TRINY Worley III. Pt enrolled with [facility]. Pt normally drives herself to appointments. At discharge, family will transport. Explained that CCP would follow to assess for discharge needs.  Lebron Muniz RN-BC               Discharge Codes    No documentation.               Expected Discharge Date and Time     Expected Discharge Date Expected Discharge Time    Nov 17, 2022             Lebron Muniz RN

## 2022-11-15 NOTE — CASE MANAGEMENT/SOCIAL WORK
Discharge Planning Assessment  [facility]     Patient Name: Carole Weaver  MRN: [id number]  Today's Date: 11/15/2022    Admit Date: 11/14/2022    Plan: Home with family and [facility]. [facility] to provide wound vac and will deliver to room 11/16. Family to transport.   Discharge Needs Assessment     Row Name 11/15/22 1641       Living Environment    People in Home spouse;child(zandra), adult    Name(s) of People in Home Justus Weaver, Spouse, [phone] and 24 yr old son    Current Living Arrangements home    Primary Care Provided by self    Provides Primary Care For no one    Family Caregiver if Needed child(zandra), adult;spouse    Family Caregiver Names Justus Weaver, Spouse, [phone] and 24 yr old son    Quality of Family Relationships unable to assess    Able to Return to Prior Arrangements yes       Resource/Environmental Concerns    Resource/Environmental Concerns none    Transportation Concerns none       Transition Planning    Patient/Family Anticipates Transition to home with family    Patient/Family Anticipated Services at Transition home health care    Transportation Anticipated family or friend will provide       Discharge Needs Assessment    Readmission Within the Last 30 Days no previous admission in last 30 days    Equipment Currently Used at Home colostomy/ostomy supplies    Concerns to be Addressed discharge planning;care coordination/care conferences    Anticipated Changes Related to Illness none    Equipment Needed After Discharge wound care supplies;negative pressure wound therapy device    Discharge Facility/Level of Care Needs home with home health    Provided Post Acute Provider List? Refused    Refused Provider List Comment Requests to use [facility]  as she has used them in the past.    Provided Post Acute Provider Quality & Resource List? Refused               Discharge Plan     Row Name 11/15/22 3208       Plan    Plan Home with family and [facility]. [facility] to provide wound vac and will deliver to  room 11/16. Family to transport.    Patient/Family in Agreement with Plan yes    Plan Comments Met with pt. at bedside. Explained roll of . Face sheet and pharmacy verified. Pt lives with Justus Weaver, Spouse, [phone] and 24 yr. old son. There is one step to enter home.  DME equipment includes Ostomy supplies. Plan is for pt. to D/C with wound Vac. Notified [facility] of need for wound vac tomorrow 11/16. Order Faxed to [facility]. Pt is independent with ADLs. Pt has never been to Rehab but has used [facility]  in the past. Pt requests [facility]  see her at D/C for wound vac dressing changes. Referrals placed to [facility]  and they will see at D/C.  Pt’s PCP is TRINY Worley III. Pt enrolled with [facility]. Pt normally drives herself to appointments. At discharge, family will transport. Explained that CCP would follow to assess for discharge needs.  Lebron Muniz RN-BC              Continued Care and Services - Admitted Since 11/14/2022     Durable Medical Equipment Coordination complete.    Service Provider Request Status Selected Services Address Phone Fax Patient Preferred    [facility]  Selected Durable Medical Equipment [address] [address] [phone] --          Home Medical Care Coordination complete.    Service Provider Request Status Selected Services Address Phone Fax Patient Preferred    [facility]  [facility] [address] [address] [phone] --              Expected Discharge Date and Time     Expected Discharge Date Expected Discharge Time    Nov 17, 2022          Demographic Summary     Row Name 11/15/22 1640       General Information    Admission Type inpatient    Arrived From PACU/recovery room    Reason for Consult discharge planning;care coordination/care conference    Preferred Language English               Functional Status     Row Name 11/15/22 1640        Functional Status    Usual Activity Tolerance good    Current Activity Tolerance moderate       Functional Status, IADL    Medications independent    Meal Preparation independent    Housekeeping independent    Laundry independent    Shopping independent       Mental Status    General Appearance WDL WDL       Mental Status Summary    Recent Changes in Mental Status/Cognitive Functioning no changes               Psychosocial    No documentation.                Abuse/Neglect    No documentation.                Legal    No documentation.                Substance Abuse    No documentation.                Patient Forms    No documentation.                   Lebron Muniz RN

## 2022-11-15 NOTE — PAYOR COMM NOTE
"Carole Weaver (42 y.o. Female)       PLEASE SEE ATTACHED FOR INPT NOTIFICATION OF ADMIT AND DAYS APPROVED.     REF#[id number]    PLEASE CALL   OR  [phone] WITH DAYS APPROVED AND WHEN NEXT UPDATE IS DUE.   [facility] IS [facility]G WITH [facility] WITH A 10 O/L.    THANK YOU    UZIEL ZENDEJAS LPN CCP    Date of Birth   1980    Social Security Number       Address   [address] [place] [address]    Home Phone   [phone]    MRN   [id number]       [employer]   [employer]    Marital Status                               Admission Date   11/14/22    Admission Type   Elective    Admitting Provider   Padmaja Ye MD    Attending Provider   Padmaja Ye MD    Department, Room/Bed   [address], E458/1       Discharge Date       Discharge Disposition       Discharge Destination                               Attending Provider: Padmaja Ye MD    Allergies: No Known Allergies    Isolation: None   Infection: None   Code Status: CPR    Ht: 167.6 cm (66\")   Wt: 99.4 kg (219 lb 1.6 oz)    Admission Cmt: None   Principal Problem: History of rectal cancer [Z85.048]                 Active Insurance as of 11/14/2022     Primary Coverage     Payor Plan Insurance Group Employer/Plan Group    ANTH BLUE CROSS ANTH BLUE CROSS BLUE SHIELD PPO [id number]     Payor Plan Address Payor Plan Phone Number Payor Plan Fax Number Effective Dates    [address] [phone]  1/1/2018 - None Entered    [place] [address]       Subscriber Name Subscriber Birth Date Member ID       JUSTUS WEAVER 1980 [id number]                 Emergency Contacts      (Rel.) Home Phone Work Phone Mobile Phone    Justus Weaver (Spouse) [phone] -- [phone]    DENIS CRONIN (Mother) [phone] -- [phone]              Oxygen Therapy (since admission)     Date/Time SpO2 Device (Oxygen Therapy) Flow (L/min) Oxygen Concentration (%) ETCO2 (mmHg)    " 11/15/22 0715 92 -- -- -- --    11/15/22 0044 -- room air -- -- --    11/14/22 2356 91 room air -- -- --    11/14/22 2030 -- nasal cannula 2 -- --    11/14/22 2025 95 nasal cannula 2 -- --    11/14/22 1810 94 nasal cannula 2 -- --    11/14/22 1745 93 nasal cannula 2 -- --    11/14/22 1730 92 -- -- -- --    11/14/22 1715 93 -- -- -- --    11/14/22 1700 96 nasal cannula 2 -- --    11/14/22 1645 95 nasal cannula 2 -- --    11/14/22 1630 95 nasal cannula 2 -- --    11/14/22 1615 97 -- -- -- --    11/14/22 1600 94 -- -- -- --    11/14/22 1554 99 nasal cannula 4 -- --    11/14/22 1200 100 room air -- -- --        Intake & Output (last 2 days)       11/13 0701 11/14 0700 11/14 0701  11/15 0700 11/15 0701  11/16 0700    P.O.  0 0    I.V.  1000     Total Intake  1000 0    Net  +1000 0           Urine Unmeasured Occurrence  1 x         Lines, Drains & Airways     Active LDAs     Name Placement date Placement time Site Days    Peripheral IV 11/14/22 1139 Right Antecubital 11/14/22  1139  Antecubital  less than 1    Single Lumen Implantable Port 12/18/20 Right Chest 12/18/20  1200  Chest  696                  Medication Administration Report for Carole Weaver as of 11/15/22 0998   Legend:    Given Hold Not Given Due Canceled Entry Other Actions    Time Time (Time) Time  Time-Action       Discontinued     Completed     Future     MAR Hold     Linked           Medications 11/13/22 11/14/22 11/15/22    acetaminophen (TYLENOL) tablet 1,000 mg  Dose: 1,000 mg  Freq: Every 6 Hours Route: PO  Start: 11/14/22 1800   Admin Instructions:   Do not exceed 4 grams of acetaminophen in a 24 hr period. Max dose of 2gm for AST/ALT greater than 120 units/L    If given for fever, use fever parameter: fever greater than 100.4 °F.    If given for pain, use the following pain scale:   Mild Pain = Pain Score of 1-3, CPOT 1-2  Moderate Pain = Pain Score of 4-6, CPOT 3-4  Severe Pain = Pain Score of 7-10, CPOT 5-8     1946-Given           0038-Given     (0755)-Not Given [C]     1200       1800             alvimopan (ENTEREG) capsule 12 mg  Dose: 12 mg  Freq: 2 Times Daily Route: PO  Start: 11/15/22 0900   End: 11/22/22 0859   Admin Instructions:   Start the morning after surgery. Discontinue Entereg once patient has first bowel movement. Discontinue Entereg once opiods have been discontinued.Discontinue at discharge. For Inpatient use only. Maximum 15 doses (pre and post-op).  Inpatient use only, max 15 total doses (pre and post op).  Discontinue after first BM or when narcotics have been discontinued.   Order specific questions:   Is this medication for a partial large or small bowel resection with primary anastomosis? Yes  Has the patient recieved daily opioids for the past 7 days? No        0907-Given     2100            celecoxib (CeleBREX) capsule 200 mg  Dose: 200 mg  Freq: Every 12 Hours Scheduled Route: PO  Indications Comment: Post-op Pain  Start: 11/14/22 2100   End: 11/17/22 2059   Admin Instructions:   Take with food if GI upset occurs.  If given for pain, use the following pain scale:  Mild Pain = Pain Score of 1-3, CPOT 1-2  Moderate Pain = Pain Score of 4-6, CPOT 3-4  Severe Pain = Pain Score of 7-10, CPOT 5-8     2151-Given          0907-Given 2100            cetirizine (zyrTEC) tablet 10 mg  Dose: 10 mg  Freq: Daily Route: PO  Start: 11/14/22 1915 2030-Given          0907-Given             Enoxaparin Sodium (LOVENOX) syringe 100 mg  Dose: 1 mg/kg  Weight Dosing Info: 99.7 kg  Freq: Every 12 Hours Scheduled Route: SC  Indications of Use: DVT/PE (active thrombosis)  Start: 11/14/22 2100   Admin Instructions:   Give subcutaneous in abdomen only. Do not massage site after injection.     2031-Given          0907-Given     2100            famotidine (PEPCID) tablet 20 mg  Dose: 20 mg  Freq: 2 Times Daily Route: PO  Start: 11/14/22 2100 2031-Given          0907-Given     2100            gabapentin (NEURONTIN) capsule 400  mg  Dose: 400 mg  Freq: Every 8 Hours Scheduled Route: PO  Start: 11/14/22 2200   End: 11/17/22 2159   Admin Instructions:   For patients less than 65 years of age.       2149-Given          0539-Given     1400 2200           HYDROmorphone (DILAUDID) injection 0.25 mg  Dose: 0.25 mg  Freq: Every 3 Hours PRN Route: IV  PRN Reason: Severe Pain  Start: 11/14/22 1816   End: 11/24/22 1815   Admin Instructions:   If given for pain, use the following pain scale:  Mild Pain = Pain Score of 1-3, CPOT 1-2  Moderate Pain = Pain Score of 4-6, CPOT 3-4  Severe Pain = Pain Score of 7-10, CPOT 5-8      0044-Given     0926-Given           And  naloxone (NARCAN) injection 0.4 mg  Dose: 0.4 mg  Freq: Every 5 Minutes PRN Route: IV  PRN Reason: Respiratory Depression  Start: 11/14/22 1816   Admin Instructions:   If respiratory rate is less than 8 breaths/minute or patient is difficult to arouse stop any narcotics and contact physician.   Administer slow IV push. Repeat as ordered until patient's respiratory rate is greater than 12 breaths/minute.          LORazepam (ATIVAN) tablet 0.5 mg  Dose: 0.5 mg  Freq: Every 8 Hours PRN Route: PO  PRN Reason: Anxiety  PRN Comment: muscle spasms  Start: 11/14/22 1816   End: 11/21/22 1815   Admin Instructions:    Caution: Look alike/sound alike drug alert          metoprolol succinate XL (TOPROL-XL) 24 hr tablet 12.5 mg  Dose: 12.5 mg  Freq: Nightly Route: PO  Start: 11/14/22 2100   Admin Instructions:   Do not crush or chew.     2030-Given          2100             nitroglycerin (NITROSTAT) SL tablet 0.4 mg  Dose: 0.4 mg  Freq: Every 5 Minutes PRN Route: SL  PRN Reason: Chest Pain  PRN Comment: Systolic BP Greater Than 100  Start: 11/14/22 1816   Admin Instructions:   Notify Provider if Pain Unrelieved After 3 Doses          ondansetron (ZOFRAN) injection 4 mg  Dose: 4 mg  Freq: Every 6 Hours PRN Route: IV  PRN Reasons: Nausea,Vomiting  Start: 11/14/22 1816          scopolamine patch 1 mg/72  hr  Dose: 1 patch  Freq: Continuous Route: TD  Start: 11/14/22 1126   End: 11/17/22 1125   Admin Instructions:   DO NOT Apply If Patient Older Than 65 or Has History of Glaucoma       1154-Medication Applied             Completed Medications  Medications 11/13/22 11/14/22 11/15/22       acetaminophen (TYLENOL) tablet 1,000 mg  Dose: 1,000 mg  Freq: Once Route: PO  Start: 11/14/22 1126   End: 11/14/22 1154   Admin Instructions:   Do not exceed 4 grams of acetaminophen in a 24 hr period. Max dose of 2gm for AST/ALT greater than 120 units/L    If given for fever, use fever parameter: fever greater than 100.4 °F.    If given for pain, use the following pain scale:   Mild Pain = Pain Score of 1-3, CPOT 1-2  Moderate Pain = Pain Score of 4-6, CPOT 3-4  Severe Pain = Pain Score of 7-10, CPOT 5-8     1154-Given              alvimopan (ENTEREG) capsule 12 mg  Dose: 12 mg  Freq: Once Route: PO  Start: 11/14/22 1126   End: 11/14/22 1154   Admin Instructions:   Contraindicated in end stage renal failure or severe hepatic impairment.   Administer dose 30 minutes to 5 hours prior to surgery.  Inpatient use only, max 15 total doses (pre and post op).  Discontinue after first BM or when narcotics have been discontinued.   Order specific questions:   Is this medication for a partial large or small bowel resection with primary anastomosis? Yes  Has the patient recieved daily opioids for the past 7 days? No       1154-Given              ceFAZolin in dextrose (ANCEF) IVPB solution 2 g  Dose: 2 g  Freq: Once Route: IV  Indications of Use: PERIOPERATIVE PHARMACOPROPHYLAXIS  Start: 11/14/22 1126   End: 11/14/22 1352   Admin Instructions:   Patient less than 120 kg  Caution: Look alike/sound alike drug alert     1322-New Bag     1352-Stopped            And  metroNIDAZOLE (FLAGYL) IVPB 500 mg  Dose: 500 mg  Freq: Once Route: IV  Indications of Use: PERIOPERATIVE PHARMACOPROPHYLAXIS  Start: 11/14/22 1126   End: 11/14/22 1418   Admin  Instructions:   Caution: Look alike/sound alike drug alert.  Do not refrigerate.     1322-New Bag     1418-Stopped             celecoxib (CeleBREX) capsule 200 mg  Dose: 200 mg  Freq: Once Route: PO  Start: 11/14/22 1126   End: 11/14/22 1154   Admin Instructions:   Take with food if GI upset occurs.  If given for pain, use the following pain scale:  Mild Pain = Pain Score of 1-3, CPOT 1-2  Moderate Pain = Pain Score of 4-6, CPOT 3-4  Severe Pain = Pain Score of 7-10, CPOT 5-8     1154-Given              famotidine (PEPCID) injection 20 mg  Dose: 20 mg  Freq: Once Route: IV  Start: 11/14/22 1219   End: 11/14/22 1236   Admin Instructions:   Give IV push over 2 minutes.     1236-Given              gabapentin (NEURONTIN) capsule 600 mg  Dose: 600 mg  Freq: Once Route: PO  Start: 11/14/22 1126   End: 11/14/22 1155   Admin Instructions:        1155-Given              HYDROcodone-acetaminophen (NORCO) 7.5-325 MG per tablet 1 tablet  Dose: 1 tablet  Freq: Once As Needed Route: PO  PRN Reason: Moderate Pain  Start: 11/14/22 1541   End: 11/14/22 1653   Admin Instructions:   [LALITA]    Do not exceed 4 grams of acetaminophen in a 24 hr period. Max dose of 2gm for AST/ALT greater than 120 units/L        If given for pain, use the following pain scale:   Mild Pain = Pain Score of 1-3, CPOT 1-2  Moderate Pain = Pain Score of 4-6, CPOT 3-4  Severe Pain = Pain Score of 7-10, CPOT 5-8     1653-Given              midazolam (VERSED) injection 1 mg  Dose: 1 mg  Freq: Every 10 Minutes PRN Route: IV  PRN Comment: Anxiety prophylaxis, Pre-op comfort  Start: 11/14/22 1213   End: 11/14/22 1250   Admin Instructions:   May repeat dose in 10 minutes one time then contact provider for additional orders.       1236-Given     1250-Given             midazolam (VERSED) injection 2 mg  Dose: 2 mg  Freq: Once Route: IV  Start: 11/14/22 1314   End: 11/14/22 1323   Admin Instructions:        1323-Given              ondansetron (ZOFRAN) injection 4  mg  Dose: 4 mg  Freq: Once As Needed Route: IV  PRN Reasons: Nausea,Vomiting  Indications of Use: POSTOPERATIVE NAUSEA AND VOMITING  Start: 11/14/22 1541   End: 11/14/22 1657   Admin Instructions:   If BOTH ondansetron (ZOFRAN) and promethazine (PHENERGAN) are ordered use ondansetron first and THEN promethazine IF ondansetron is ineffective.     1657-Given             Discontinued Medications  Medications 11/13/22 11/14/22 11/15/22       bupivacaine-EPINEPHrine PF (MARCAINE w/EPI) 0.25% [id number] injection  Freq: As Needed  Start: 11/14/22 1527   End: 11/14/22 1553     1527-Given              diphenhydrAMINE (BENADRYL) capsule 25 mg  Dose: 25 mg  Freq: Every 30 Minutes PRN Route: PO  PRN Reason: Itching  PRN Comment: May repeat x 1  Indications of Use: EXTRAPYRAMIDAL DISORDER,PRURITUS  Start: 11/14/22 1541   End: 11/14/22 1815   Admin Instructions:   Caution: Look alike/sound alike drug alert. This med may be ordered in other forms and routes. Before giving verify the last time the drug was given by any route/form.            diphenhydrAMINE (BENADRYL) injection 12.5 mg  Dose: 12.5 mg  Freq: Every 15 Minutes PRN Route: IV  PRN Reason: Itching  PRN Comment: May repeat x 1  Start: 11/14/22 1541   End: 11/14/22 1815   Admin Instructions:   Caution: Look alike/sound alike drug alert. This med may be ordered in other forms and routes. Before giving verify the last time the drug was given by any route/form.            ePHEDrine injection 5 mg  Dose: 5 mg  Freq: Once As Needed Route: IV  PRN Comment: symptomatic hypotension - Notify attending anesthesiologist if this needs to be given  Start: 11/14/22 1541   End: 11/14/22 1815   Admin Instructions:   Caution: Look alike/sound alike drug alert   Dilute with NS to 5-10 mg/mL.  Central line preferred, if unavailable use large bore IV access with frequent nurse monitoring of IV site.          fentaNYL citrate (PF) (SUBLIMAZE) injection 50 mcg  Dose: 50 mcg  Freq: Every 5  Minutes PRN Route: IV  PRN Reasons: Moderate Pain,Severe Pain  Start: 11/14/22 1541   End: 11/14/22 1815   Admin Instructions:   May alternate fentanyl with hydromorphone using fentanyl first.    Maximum total dose of fentanyl is 200 mcg.  If given for pain, use the following pain scale:  Mild Pain = Pain Score of 1-3, CPOT 1-2  Moderate Pain = Pain Score of 4-6, CPOT 3-4  Severe Pain = Pain Score of 7-10, CPOT 5-8     1614-Given              flumazenil (ROMAZICON) injection 0.2 mg  Dose: 0.2 mg  Freq: As Needed Route: IV  PRN Comment: for benzodiazepine induced unresponsiveness or sedation  Indications of Use: BENZODIAZEPINE-INDUCED SEDATION  Start: 11/14/22 1541   End: 11/14/22 1815   Admin Instructions:   Notify Anesthesia if given  ** give IV over 15-30 seconds **          hydrALAZINE (APRESOLINE) injection 5 mg  Dose: 5 mg  Freq: Every 10 Minutes PRN Route: IV  PRN Reason: High Blood Pressure  PRN Comment: for systolic blood pressure greater than 180 mmHg or diastolic blood pressure greater than 105 mmHg  Start: 11/14/22 1541   End: 11/14/22 1815   Admin Instructions:   Up to 20 mg.  Caution: Look alike/sound alike drug alert          HYDROmorphone (DILAUDID) injection 0.5 mg  Dose: 0.5 mg  Freq: Every 5 Minutes PRN Route: IV  PRN Reasons: Moderate Pain,Severe Pain  Start: 11/14/22 1541   End: 11/14/22 1815   Admin Instructions:   May alternate fentanyl with hydromorphone using fentanyl first.    Maximum total dose of hydromorphone is 2 mg.  If given for pain, use the following pain scale:  Mild Pain = Pain Score of 1-3, CPOT 1-2  Moderate Pain = Pain Score of 4-6, CPOT 3-4  Severe Pain = Pain Score of 7-10, CPOT 5-8     1620-Given [C]     1645-Given             labetalol (NORMODYNE,TRANDATE) injection 5 mg  Dose: 5 mg  Freq: Every 5 Minutes PRN Route: IV  PRN Reason: High Blood Pressure  PRN Comment: for systolic blood pressure greater than 180 mmHg or diastolic blood pressure greater than 105 mmHg  Start:  11/14/22 1541   End: 11/14/22 1815   Admin Instructions:   Hold for heart rate less than 60.  Give IV Push over 2 minutes.          lactated ringers infusion  Rate: 50 mL/hr Dose: 50 mL/hr  Freq: Continuous Route: IV  Start: 11/14/22 1915   End: 11/15/22 0704     1947-New Bag     2154-New Bag             lactated ringers infusion  Rate: 9 mL/hr Dose: 9 mL/hr  Freq: Continuous PRN Route: IV  PRN Comment: Start prior to surgery  Start: 11/14/22 1213   End: 11/14/22 1815          naloxone (NARCAN) injection 0.2 mg  Dose: 0.2 mg  Freq: As Needed Route: IV  PRN Reasons: Opioid Reversal,Respiratory Depression  PRN Comment: unresponsiveness, decrease oxygen saturation  Indications of Use: ACUTE RESPIRATORY FAILURE,OPIOID-INDUCED RESPIRATORY DEPRESSION  Start: 11/14/22 1541   End: 11/14/22 1815   Admin Instructions:   Notify Anesthesia if given          oxyCODONE-acetaminophen (PERCOCET) 7.5-325 MG per tablet 1 tablet  Dose: 1 tablet  Freq: Every 4 Hours PRN Route: PO  PRN Reason: Severe Pain  Start: 11/14/22 1541   End: 11/14/22 1815   Admin Instructions:   [LALITA]    Do not exceed 4 grams of acetaminophen in a 24 hr period. Max dose of 2gm for AST/ALT greater than 120 units/L        If given for pain, use the following pain scale:   Mild Pain = Pain Score of 1-3, CPOT 1-2  Moderate Pain = Pain Score of 4-6, CPOT 3-4  Severe Pain = Pain Score of 7-10, CPOT 5-8          promethazine (PHENERGAN) suppository 25 mg  Dose: 25 mg  Freq: Once As Needed Route: RE  PRN Reasons: Nausea,Vomiting  Start: 11/14/22 1541   End: 11/14/22 1815   Admin Instructions:   If BOTH ondansetron (ZOFRAN) and promethazine (PHENERGAN) are ordered use ondansetron first and THEN promethazine IF ondansetron is ineffective.         Or  promethazine (PHENERGAN) tablet 25 mg  Dose: 25 mg  Freq: Once As Needed Route: PO  PRN Reasons: Nausea,Vomiting  Start: 11/14/22 1541   End: 11/14/22 1815   Admin Instructions:   If BOTH ondansetron (ZOFRAN) and  promethazine (PHENERGAN) are ordered use ondansetron first and THEN promethazine IF ondansetron is ineffective.            sodium chloride (NS) irrigation solution  Freq: As Needed  Start: 11/14/22 1404   End: 11/14/22 1553 1404-Given [C]              sodium chloride 0.9 % flush 10 mL  Dose: 10 mL  Freq: As Needed Route: IV  PRN Reason: Line Care  Start: 11/14/22 1213   End: 11/14/22 1554          sodium chloride 0.9 % flush 10 mL  Dose: 10 mL  Freq: Every 12 Hours Scheduled Route: IV  Start: 11/14/22 1216   End: 11/14/22 1554 1216                          Physician Progress Notes (all)      Charissa Messina PA-C at 11/15/22 0824          Progress Note    Pod 1      S:  negative BM. positive ambulating. Pain controlled with ERAS. Voiding spontaneously. Tolerating clear liquid diet. No N/V.     O:  Temp:  [97.4 °F (36.3 °C)-97.9 °F (36.6 °C)] 97.7 °F (36.5 °C)  Heart Rate:  [16-94] 94  Resp:  [16-18] 16  BP: (105-166)/() 105/78      Intake/Output Summary (Last 24 hours) at 11/15/2022 0825  Last data filed at 11/15/2022 0350  Gross per 24 hour   Intake 1000 ml   Output --   Net 1000 ml       Abd:   soft, non-distended  Wound present in RLQ.       Results from last 7 days   Lab Units 11/15/22  0436   WBC 10*3/mm3 12.46*   HEMOGLOBIN g/dL 12.8   HEMATOCRIT % 37.8   PLATELETS 10*3/mm3 219     Results from last 7 days   Lab Units 11/15/22  0437   SODIUM mmol/L 136   POTASSIUM mmol/L 4.3   CHLORIDE mmol/L 106   CO2 mmol/L 19.5*   BUN mg/dL 11   CREATININE mg/dL 0.72   EGFR mL/min/1.73 106.5   GLUCOSE mg/dL 120*   CALCIUM mg/dL 9.1       Results from last 7 days   Lab Units 11/15/22  0437   MAGNESIUM mg/dL 2.0         A/P: 42 y.o. female with s/p ILEOSTOMY TAKEDOWN     - Advance to regular diet  - Hep Lock  - Awaiting bowel function         Electronically signed by Charissa Messina PA-C at 11/15/22 9794

## 2022-11-15 NOTE — ADDENDUM NOTE
Addendum  created 11/15/22 0915 by Narayan Aguero MD    Child order released for a procedure order, Order Canceled from Note

## 2022-11-15 NOTE — DISCHARGE PLACEMENT REQUEST
"Carole Weaver (42 y.o. Female)     Date of Birth   1980    Social Security Number       Address   [address] [place] [address]    Home Phone   [phone]    MRN   [id number]       [employer]    Marital Status                               Admission Date   11/14/22    Admission Type   Elective    Admitting Provider   Padmaja Ye MD    Attending Provider   Padmaja Ye MD    Department, Room/Bed   [address], E458/1       Discharge Date       Discharge Disposition       Discharge Destination                               Attending Provider: Padmaja Ye MD    Allergies: No Known Allergies    Isolation: None   Infection: None   Code Status: CPR    Ht: 167.6 cm (66\")   Wt: 99.4 kg (219 lb 1.6 oz)    Admission Cmt: None   Principal Problem: History of rectal cancer [Z85.048]                 Active Insurance as of 11/14/2022     Primary Coverage     Payor Plan Insurance Group Employer/Plan Group    ANTHEM BLUE CROSS ANTHEM BLUE CROSS BLUE SHIELD PPO [id number]     Payor Plan Address Payor Plan Phone Number Payor Plan Fax Number Effective Dates    [address] [phone]  1/1/2018 - None Entered    [facility] [address]       Subscriber Name Subscriber Birth Date Member ID       JUSTUS WEAVER 1980 [id number]                 Emergency Contacts      (Rel.) Home Phone Work Phone Mobile Phone    OwenJustus (Spouse) [phone] -- [phone]    DENIS CRONIN (Mother) [phone] -- [phone]              "

## 2022-11-15 NOTE — PROGRESS NOTES
Progress Note    Pod 1      S:  negative BM. positive ambulating. Pain controlled with ERAS. Voiding spontaneously. Tolerating clear liquid diet. No N/V.     O:  Temp:  [97.4 °F (36.3 °C)-97.9 °F (36.6 °C)] 97.7 °F (36.5 °C)  Heart Rate:  [16-94] 94  Resp:  [16-18] 16  BP: (105-166)/() 105/78      Intake/Output Summary (Last 24 hours) at 11/15/2022 0825  Last data filed at 11/15/2022 0350  Gross per 24 hour   Intake 1000 ml   Output --   Net 1000 ml       Abd:   soft, non-distended  Wound present in RLQ.       Results from last 7 days   Lab Units 11/15/22  0436   WBC 10*3/mm3 12.46*   HEMOGLOBIN g/dL 12.8   HEMATOCRIT % 37.8   PLATELETS 10*3/mm3 219     Results from last 7 days   Lab Units 11/15/22  0437   SODIUM mmol/L 136   POTASSIUM mmol/L 4.3   CHLORIDE mmol/L 106   CO2 mmol/L 19.5*   BUN mg/dL 11   CREATININE mg/dL 0.72   EGFR mL/min/1.73 106.5   GLUCOSE mg/dL 120*   CALCIUM mg/dL 9.1       Results from last 7 days   Lab Units 11/15/22  0437   MAGNESIUM mg/dL 2.0         A/P: 42 y.o. female with s/p ILEOSTOMY TAKEDOWN     - Advance to regular diet  - Hep Lock  - Awaiting bowel function

## 2022-11-15 NOTE — NURSING NOTE
11/15/22 0729   Wound 11/14/22 1400 Right abdomen Incision   Placement Date/Time: 11/14/22 1400   Side: Right  Location: abdomen  Primary Wound Type: Incision   Dressing Appearance dry;intact   Base clean;moist;red   Periwound intact;dry;ecchymotic   Edges open   Wound Length (cm) 2.5 cm   Wound Width (cm) 4 cm   Wound Depth (cm) 4.5 cm   Wound Surface Area (cm^2) 10 cm^2   Wound Volume (cm^3) 45 cm^3   Drainage Characteristics/Odor sanguineous   Drainage Amount small   Care, Wound cleansed with;sterile normal saline;negative pressure wound therapy   Dressing Care dressing applied;dressing changed;foam;transparent film   Periwound Care barrier film applied   NPWT (Negative Pressure Wound Therapy) 11/15/22   Placement Date: 11/15/22     Therapy Setting continuous therapy   Dressing foam, black;transparent dressing   Pressure Setting 125 mmHg   Sponges Inserted 1   WOCN: Wound VAC applied to RLQ ostomy take down site. Tolerate well. Plan to change friday

## 2022-11-15 NOTE — PLAN OF CARE
Goal Outcome Evaluation:  Plan of Care Reviewed With: patient        Progress: improving  Outcome Evaluation: Received pt in bed. Alert and orientedx4. c/o pain Dilaudid givenx1 per prn as well as standing pain meds. Up ad dmitri to BR to void. MIVF infusing. VSS. Afebrile. Dsg to RLQ C/D/I. Will CTM.

## 2022-11-15 NOTE — PLAN OF CARE
Goal Outcome Evaluation:  Plan of Care Reviewed With: patient        Progress: improving  Outcome Evaluation: Pt up ambulating with sba. Encouraged pt to TCDB, refused IS. Pain being well managed with ERAS system. Currently on RA. Pt yet to have a bm. LLQ and RLQ bowel sounds audible but dull. Pt reports instances with cramping sensations that are intermittent. Encouraged pt to increase activity as tolerated. Pt hopeful to d/c tomorrow. VSS. Safety maintained.

## 2022-11-16 LAB
BASOPHILS # BLD AUTO: 0.02 10*3/MM3 (ref 0–0.2)
BASOPHILS NFR BLD AUTO: 0.2 % (ref 0–1.5)
DEPRECATED RDW RBC AUTO: 41.6 FL (ref 37–54)
EOSINOPHIL # BLD AUTO: 0.14 10*3/MM3 (ref 0–0.4)
EOSINOPHIL NFR BLD AUTO: 1.7 % (ref 0.3–6.2)
ERYTHROCYTE [DISTWIDTH] IN BLOOD BY AUTOMATED COUNT: 12.5 % (ref 12.3–15.4)
HCT VFR BLD AUTO: 36.7 % (ref 34–46.6)
HGB BLD-MCNC: 13 G/DL (ref 12–15.9)
IMM GRANULOCYTES # BLD AUTO: 0.19 10*3/MM3 (ref 0–0.05)
IMM GRANULOCYTES NFR BLD AUTO: 2.3 % (ref 0–0.5)
LAB AP CASE REPORT: NORMAL
LYMPHOCYTES # BLD AUTO: 1.31 10*3/MM3 (ref 0.7–3.1)
LYMPHOCYTES NFR BLD AUTO: 15.8 % (ref 19.6–45.3)
MCH RBC QN AUTO: 32.3 PG (ref 26.6–33)
MCHC RBC AUTO-ENTMCNC: 35.4 G/DL (ref 31.5–35.7)
MCV RBC AUTO: 91.1 FL (ref 79–97)
MONOCYTES # BLD AUTO: 0.64 10*3/MM3 (ref 0.1–0.9)
MONOCYTES NFR BLD AUTO: 7.7 % (ref 5–12)
NEUTROPHILS NFR BLD AUTO: 5.98 10*3/MM3 (ref 1.7–7)
NEUTROPHILS NFR BLD AUTO: 72.3 % (ref 42.7–76)
NRBC BLD AUTO-RTO: 0 /100 WBC (ref 0–0.2)
PATH REPORT.FINAL DX SPEC: NORMAL
PATH REPORT.GROSS SPEC: NORMAL
PLATELET # BLD AUTO: 184 10*3/MM3 (ref 140–450)
PMV BLD AUTO: 9.4 FL (ref 6–12)
RBC # BLD AUTO: 4.03 10*6/MM3 (ref 3.77–5.28)
WBC NRBC COR # BLD: 8.28 10*3/MM3 (ref 3.4–10.8)

## 2022-11-16 PROCEDURE — 25010000002 METOCLOPRAMIDE PER 10 MG: Performed by: PHYSICIAN ASSISTANT

## 2022-11-16 PROCEDURE — 25010000002 ENOXAPARIN PER 10 MG: Performed by: COLON & RECTAL SURGERY

## 2022-11-16 PROCEDURE — 99024 POSTOP FOLLOW-UP VISIT: CPT | Performed by: PHYSICIAN ASSISTANT

## 2022-11-16 PROCEDURE — 85025 COMPLETE CBC W/AUTO DIFF WBC: CPT | Performed by: PHYSICIAN ASSISTANT

## 2022-11-16 RX ORDER — METOCLOPRAMIDE HYDROCHLORIDE 5 MG/ML
10 INJECTION INTRAMUSCULAR; INTRAVENOUS
Status: DISCONTINUED | OUTPATIENT
Start: 2022-11-16 | End: 2022-11-16

## 2022-11-16 RX ORDER — METOCLOPRAMIDE HYDROCHLORIDE 5 MG/ML
10 INJECTION INTRAMUSCULAR; INTRAVENOUS
Status: DISCONTINUED | OUTPATIENT
Start: 2022-11-16 | End: 2022-11-17 | Stop reason: HOSPADM

## 2022-11-16 RX ADMIN — ACETAMINOPHEN 1000 MG: 500 TABLET ORAL at 11:58

## 2022-11-16 RX ADMIN — GABAPENTIN 400 MG: 400 CAPSULE ORAL at 06:24

## 2022-11-16 RX ADMIN — METOCLOPRAMIDE 10 MG: 5 INJECTION, SOLUTION INTRAMUSCULAR; INTRAVENOUS at 13:40

## 2022-11-16 RX ADMIN — ACETAMINOPHEN 1000 MG: 500 TABLET ORAL at 00:15

## 2022-11-16 RX ADMIN — METOCLOPRAMIDE 10 MG: 5 INJECTION, SOLUTION INTRAMUSCULAR; INTRAVENOUS at 17:22

## 2022-11-16 RX ADMIN — ENOXAPARIN SODIUM 100 MG: 100 INJECTION SUBCUTANEOUS at 08:39

## 2022-11-16 RX ADMIN — ALVIMOPAN 12 MG: 12 CAPSULE ORAL at 08:39

## 2022-11-16 RX ADMIN — MAGNESIUM HYDROXIDE 10 ML: 2400 SUSPENSION ORAL at 08:39

## 2022-11-16 RX ADMIN — METOPROLOL SUCCINATE 12.5 MG: 25 TABLET, EXTENDED RELEASE ORAL at 20:49

## 2022-11-16 RX ADMIN — FAMOTIDINE 20 MG: 20 TABLET ORAL at 20:49

## 2022-11-16 RX ADMIN — ACETAMINOPHEN 1000 MG: 500 TABLET ORAL at 23:49

## 2022-11-16 RX ADMIN — FAMOTIDINE 20 MG: 20 TABLET ORAL at 08:39

## 2022-11-16 RX ADMIN — LORAZEPAM 0.5 MG: 0.5 TABLET ORAL at 06:27

## 2022-11-16 RX ADMIN — ACETAMINOPHEN 1000 MG: 500 TABLET ORAL at 06:23

## 2022-11-16 RX ADMIN — GABAPENTIN 400 MG: 400 CAPSULE ORAL at 13:40

## 2022-11-16 RX ADMIN — ACETAMINOPHEN 1000 MG: 500 TABLET ORAL at 17:22

## 2022-11-16 RX ADMIN — GABAPENTIN 400 MG: 400 CAPSULE ORAL at 20:48

## 2022-11-16 RX ADMIN — CELECOXIB 200 MG: 200 CAPSULE ORAL at 20:48

## 2022-11-16 RX ADMIN — ENOXAPARIN SODIUM 100 MG: 100 INJECTION SUBCUTANEOUS at 20:49

## 2022-11-16 RX ADMIN — CELECOXIB 200 MG: 200 CAPSULE ORAL at 08:39

## 2022-11-16 RX ADMIN — CETIRIZINE HYDROCHLORIDE 10 MG: 10 TABLET ORAL at 08:39

## 2022-11-16 NOTE — PAYOR COMM NOTE
"Carole Weaver (42 y.o. Female)     U/D FOR [id number]    CONTACT ELLA STEWARD  P# [phone]  F# [phone]      Date of Birth   1980    Social Security Number       Address   [address] [place] [address]    Home Phone   [phone]    MRN   [id number]       Scientology   Denominational    Marital Status                               Admission Date   11/14/22    Admission Type   Elective    Admitting Provider   Padmaja Ye MD    Attending Provider   Padmaja Ye MD    Department, Room/Bed   [address], E458/1       Discharge Date       Discharge Disposition       Discharge Destination                               Attending Provider: Padmaja Ye MD    Allergies: No Known Allergies    Isolation: None   Infection: None   Code Status: CPR    Ht: 167.6 cm (66\")   Wt: 99.4 kg (219 lb 1.6 oz)    Admission Cmt: None   Principal Problem: History of rectal cancer [Z85.048]                 Active Insurance as of 11/14/2022     Primary Coverage     Payor Plan Insurance Group Employer/Plan Group    [employer] BLUE [employer] O [id number]     Payor Plan Address Payor Plan Phone Number Payor Plan Fax Number Effective Dates    [address] [phone]  1/1/2018 - None Entered    [place] [address]       Subscriber Name Subscriber Birth Date Member ID       JUSTUS WEAVER 1980 [id number]                 Emergency Contacts      (Rel.) Home Phone Work Phone Mobile Phone    Justus Weaver (Spouse) [phone] -- [phone]    DENIS CRONIN (Mother) [phone] -- [phone]               Physician Progress Notes (last 24 hours)      Charissa Messina PA-C at 11/16/22 0804     Attestation signed by Padmaja Ye MD at 11/16/22 1045    I have reviewed this documentation and agree.                  Progress Note    Pod 2      S: Tolerating regular diet. Pain controlled with ERAS. Voiding spontaneously without " difficulty. Positive ambulation. No BM.     O:  Temp:  [97.6 °F (36.4 °C)-98.3 °F (36.8 °C)] 98.3 °F (36.8 °C)  Heart Rate:  [] 101  Resp:  [18] 18  BP: ()/(55-89) 96/89      Intake/Output Summary (Last 24 hours) at 11/16/2022 0804  Last data filed at 11/16/2022 0000  Gross per 24 hour   Intake 480 ml   Output --   Net 480 ml       Abd:   soft, non-distended  Wound-vac in [facility] in [facility].      Results from last 7 days   Lab Units 11/16/22  0438   WBC 10*3/mm3 8.28   HEMOGLOBIN g/dL 13.0   HEMATOCRIT % 36.7   PLATELETS 10*3/mm3 184       A/P: 42 y.o. female with s/p ILEOSTOMY TAKEDOWN     - Awaiting wound-vac for home use to be delivered.  - Will give dose of Milk of Mg to stimulate bowel function.          Electronically signed by Padmaja Ye MD at 11/16/22 1045       Consult Notes (last 24 hours)  Notes from 11/15/22 1228 through 11/16/22 1228   No notes of this type exist for this encounter.     11/15 Home with family and [employer] [facility] WONG to provide wound vac and will deliver to room 11/16. Family to transport.       All medication doses during the admission are shown, including meds that are no longer on order.  Scheduled Meds Sorted by Name  for Carole Weaver as of 11/14/22 through 11/16/22    1 Day 3 Days 7 Days 10 Days < Today >   Legend:                          Inactive     Active     Other Encounter     Linked                 Medications 11/14/22 11/15/22 11/16/22   acetaminophen (TYLENOL) tablet 1,000 mg  Dose: 1,000 mg  Freq: Every 6 Hours Route: PO  Start: 11/14/22 1800   Admin Instructions:   Do not exceed 4 grams of acetaminophen in a 24 hr period. Max dose of 2gm for AST/ALT greater than 120 units/L    If given for fever, use fever parameter: fever greater than 100.4 °F.    If given for pain, use the following pain scale:   Mild Pain = Pain Score of 1-3, CPOT 1-2  Moderate Pain = Pain Score of 4-6, CPOT 3-4  Severe Pain = Pain Score of 7-10, CPOT 5-8    8370        0036 (4563) [C]    1209     1831        0015   0623   1158     1800          acetaminophen (TYLENOL) tablet 1,000 mg  Dose: 1,000 mg  Freq: Once Route: PO  Start: 11/14/22 1126 End: 11/14/22 1154   Admin Instructions:   Do not exceed 4 grams of acetaminophen in a 24 hr period. Max dose of 2gm for AST/ALT greater than 120 units/L    If given for fever, use fever parameter: fever greater than 100.4 °F.    If given for pain, use the following pain scale:   Mild Pain = Pain Score of 1-3, CPOT 1-2  Moderate Pain = Pain Score of 4-6, CPOT 3-4  Severe Pain = Pain Score of 7-10, CPOT 5-8    1154            alvimopan (ENTEREG) capsule 12 mg  Dose: 12 mg  Freq: 2 Times Daily Route: PO  Start: 11/15/22 0900 End: 11/22/22 0859   Admin Instructions:   Start the morning after surgery. Discontinue Entereg once patient has first bowel movement. Discontinue Entereg once opiods have been discontinued.Discontinue at discharge. For Inpatient use only. Maximum 15 doses (pre and post-op).  Inpatient use only, max 15 total doses (pre and post op).  Discontinue after first BM or when narcotics have been discontinued.   Order specific questions:   Is this medication for a partial large or small bowel resection with primary anastomosis? Yes  Has the patient recieved daily opioids for the past 7 days? No     0907   211       0832   2100         alvimopan (ENTEREG) capsule 12 mg  Dose: 12 mg  Freq: Once Route: PO  Start: 11/14/22 1126 End: 11/14/22 1154   Admin Instructions:   Contraindicated in end stage renal failure or severe hepatic impairment.  Administer dose 30 minutes to 5 hours prior to surgery.  Inpatient use only, max 15 total doses (pre and post op).  Discontinue after first BM or when narcotics have been discontinued.   Order specific questions:   Is this medication for a partial large or small bowel resection with primary anastomosis? Yes  Has the patient recieved daily opioids for the past 7 days? No    1154             ceFAZolin in dextrose  (ANCEF) IVPB solution 2 g  Dose: 2 g  Freq: Once Route: IV  Indications of Use: PERIOPERATIVE PHARMACOPROPHYLAXIS  Last Dose: Stopped (11/14/22 1352)  Start: 11/14/22 1126 End: 11/14/22 1352   Admin Instructions:   Patient less than 120 kg  Caution: Look alike/sound alike drug alert    1322   1352           And  metroNIDAZOLE (FLAGYL) IVPB 500 mg  Dose: 500 mg  Freq: Once Route: IV  Indications of Use: PERIOPERATIVE PHARMACOPROPHYLAXIS  Last Dose: Stopped (11/14/22 1418)  Start: 11/14/22 1126 End: 11/14/22 1418   Admin Instructions:   Caution: Look alike/sound alike drug alert.  Do not refrigerate.    1322   1418           celecoxib (CeleBREX) capsule 200 mg  Dose: 200 mg  Freq: Every 12 Hours Scheduled Route: PO  Indications Comment: Post-op Pain  Start: 11/14/22 2100 End: 11/17/22 2059   Admin Instructions:   Take with food if GI upset occurs.  If given for pain, use the following pain scale:  Mild Pain = Pain Score of 1-3, CPOT 1-2  Moderate Pain = Pain Score of 4-6, CPOT 3-4  Severe Pain = Pain Score of 7-10, CPOT 5-8    2151        0907   2117       0839   2100         celecoxib (CeleBREX) capsule 200 mg  Dose: 200 mg  Freq: Once Route: PO  Start: 11/14/22 1126 End: 11/14/22 1154   Admin Instructions:   Take with food if GI upset occurs.  If given for pain, use the following pain scale:  Mild Pain = Pain Score of 1-3, CPOT 1-2  Moderate Pain = Pain Score of 4-6, CPOT 3-4  Severe Pain = Pain Score of 7-10, CPOT 5-8    1154            cetirizine (zyrTEC) tablet 10 mg  Dose: 10 mg  Freq: Daily Route: PO  Start: 11/14/22 1915 2030        0907        0839          Enoxaparin Sodium (LOVENOX) syringe 100 mg  Dose: 1 mg/kg  Weight Dosing Info: 99.7 kg  Freq: Every 12 Hours Scheduled Route: SC  Indications of Use: DVT/PE (active thrombosis)  Start: 11/14/22 2100   Admin Instructions:   Give subcutaneous in abdomen only. Do not massage site after injection.    2640 [id number]          famotidine (PEPCID) injection 20 mg  Dose: 20 mg  Freq: Once Route: IV  Start: 11/14/22 1219 End: 11/14/22 1236   Admin Instructions:   Give IV push over 2 minutes.    1236            famotidine (PEPCID) injection 20 mg  Dose: 20 mg  Freq: Once Route: IV  Start: 11/03/22 1309 End: 11/03/22 1613   Admin Instructions:   Give IV push over 2 minutes.         famotidine (PEPCID) tablet 20 mg  Dose: 20 mg  Freq: 2 Times Daily Route: PO  Start: 11/14/22 2100    2031        0907   2118       0839   2100         gabapentin (NEURONTIN) capsule 400 mg  Dose: 400 mg  Freq: Every 8 Hours Scheduled Route: PO  Start: 11/14/22 2200 End: 11/17/22 2159   Admin Instructions:   For patients less than 65 years of age.      2149        0539   1401   2118      0624   1400   2200        gabapentin (NEURONTIN) capsule 600 mg  Dose: 600 mg  Freq: Once Route: PO  Start: 11/14/22 1126 End: 11/14/22 1155   Admin Instructions:       1155            heparin injection 500 Units  Dose: 500 Units  Freq: Once Route: IK  Start: 11/03/22 1358 End: 11/03/22 1357         magnesium hydroxide (MILK OF MAGNESIA) suspension 10 mL  Dose: 10 mL  Freq: Once Route: PO  Start: 11/16/22 0900 End: 11/16/22 0839      0839          metoprolol succinate XL (TOPROL-XL) 24 hr tablet 12.5 mg  Dose: 12.5 mg  Freq: Nightly Route: PO  Start: 11/14/22 2100   Admin Instructions:   Do not crush or chew.    2030 2117 2100          midazolam (VERSED) injection 2 mg  Dose: 2 mg  Freq: Once Route: IV  Start: 11/14/22 1314 End: 11/14/22 1323   Admin Instructions:       1323            sodium chloride 0.9 % flush 10 mL  Dose: 10 mL  Freq: Every 12 Hours Scheduled Route: IV  Start: 11/14/22 1216 End: 11/14/22 1554    1216   1554-D/C'd         sodium chloride 0.9 % flush 3 mL  Dose: 3 mL  Freq: Every 12 Hours Scheduled Route: IV  Start: 11/03/22 1309 End: 11/03/22 1613         Medications 11/14/22 11/15/22 11/16/22         Continuous Meds Sorted by Name  for Owen,  Carole PINEDA as of 11/14/22 through 11/16/22  Legend:                          Inactive     Active     Other Encounter     Linked                 Medications 11/14/22 11/15/22 11/16/22   lactated ringers infusion  Rate: 50 mL/hr Dose: 50 mL/hr  Freq: Continuous Route: IV  Last Dose: 50 mL/hr (11/14/22 2154)  Start: 11/14/22 1915 End: 11/15/22 0704    1947   2154       0704-D/C'd         lactated ringers infusion  Freq: Continuous PRN Route: IV  Start: 11/14/22 1328 End: 11/14/22 1557    1328   1451   1557-D/C'd        lactated ringers infusion  Rate: 9 mL/hr Dose: 9 mL/hr  Freq: Continuous PRN Route: IV  PRN Comment: Start prior to surgery  Start: 11/14/22 1213 End: 11/14/22 1815    1815-D/C'd          lactated ringers infusion  Rate: 9 mL/hr Dose: 9 mL/hr  Freq: Continuous Route: IV  Last Dose: 9 mL/hr (11/03/22 1310)  Start: 11/03/22 1309 End: 11/03/22 1613         scopolamine patch 1 mg/72 hr  Dose: 1 patch  Freq: Continuous Route: TD  Start: 11/14/22 1126 End: 11/17/22 1125   Admin Instructions:   DO NOT Apply If Patient Older Than 65 or Has History of Glaucoma      1154            Medications 11/14/22 11/15/22 11/16/22         PRN Meds Sorted by Name  for Carole Weaver as of 11/14/22 through 11/16/22  Legend:                  Inactive     Active     Other Encounter     Linked                 Medications 11/14/22 11/15/22 11/16/22   bupivacaine-EPINEPHrine PF (MARCAINE w/EPI) 0.25% -[id number] injection  Freq: As Needed  Start: 11/14/22 1527 End: 11/14/22 1553    1527   1553-D/C'd         dexamethasone sodium phosphate injection  Freq: As Needed Route: IV  Start: 11/14/22 1420 End: 11/14/22 1557    1420   1557-D/C'd         diphenhydrAMINE (BENADRYL) capsule 25 mg  Dose: 25 mg  Freq: Every 30 Minutes PRN Route: PO  PRN Reason: Itching  PRN Comment: May repeat x 1  Indications of Use: EXTRAPYRAMIDAL DISORDER,PRURITUS  Start: 11/14/22 1541 End: 11/14/22 1815   Admin Instructions:   Caution: Look alike/sound alike drug  alert. This med may be ordered in other forms and routes. Before giving verify the last time the drug was given by any route/form.    1815-D/C'd          diphenhydrAMINE (BENADRYL) injection 12.5 mg  Dose: 12.5 mg  Freq: Every 15 Minutes PRN Route: IV  PRN Reason: Itching  PRN Comment: May repeat x 1  Start: 11/14/22 1541 End: 11/14/22 1815   Admin Instructions:   Caution: Look alike/sound alike drug alert. This med may be ordered in other forms and routes. Before giving verify the last time the drug was given by any route/form.    1815-D/C'd          ePHEDrine injection 5 mg  Dose: 5 mg  Freq: Once As Needed Route: IV  PRN Comment: symptomatic hypotension - Notify attending anesthesiologist if this needs to be given  Start: 11/14/22 1541 End: 11/14/22 1815   Admin Instructions:   Caution: Look alike/sound alike drug alert   Dilute with NS to 5-10 mg/mL.  Central line preferred, if unavailable use large bore IV access with frequent nurse monitoring of IV site.    1815-D/C'd          fentaNYL citrate (PF) (SUBLIMAZE) injection  Freq: As Needed Route: IV  Start: 11/14/22 1337 End: 11/14/22 1557    1337   1409   1557-D/C'd        fentaNYL citrate (PF) (SUBLIMAZE) injection 50 mcg  Dose: 50 mcg  Freq: Every 5 Minutes PRN Route: IV  PRN Reasons: Moderate Pain,Severe Pain  Start: 11/14/22 1541 End: 11/14/22 1815   Admin Instructions:   May alternate fentanyl with hydromorphone using fentanyl first.    Maximum total dose of fentanyl is 200 mcg.  If given for pain, use the following pain scale:  Mild Pain = Pain Score of 1-3, CPOT 1-2  Moderate Pain = Pain Score of 4-6, CPOT 3-4  Severe Pain = Pain Score of 7-10, CPOT 5-8    1614   1815-D/C'd         fentaNYL citrate (PF) (SUBLIMAZE) injection 50 mcg  Dose: 50 mcg  Freq: Every 10 Minutes PRN Route: IV  PRN Reason: Severe Pain  Start: 11/03/22 1307 End: 11/03/22 1613   Admin Instructions:   Maximum total dose of fentanyl is 100 mcg.  If given for pain, use the following pain  scale:  Mild Pain = Pain Score of 1-3, CPOT 1-2  Moderate Pain = Pain Score of 4-6, CPOT 3-4  Severe Pain = Pain Score of 7-10, CPOT 5-8         flumazenil (ROMAZICON) injection 0.2 mg  Dose: 0.2 mg  Freq: As Needed Route: IV  PRN Comment: for benzodiazepine induced unresponsiveness or sedation  Indications of Use: BENZODIAZEPINE-INDUCED SEDATION  Start: 11/14/22 1541 End: 11/14/22 1815   Admin Instructions:   Notify Anesthesia if given  ** give IV over 15-30 seconds **    1815-D/C'd          glycopyrrolate (ROBINUL) injection  Freq: As Needed Route: IV  Start: 11/14/22 1536 End: 11/14/22 1557    1536   1557-D/C'd         heparin injection 500 Units  Dose: 500 Units  Freq: As Needed Route: IV  PRN Reason: Line Care  Start: 11/01/22 0836 End: 11/01/22 1114   Admin Instructions:   Use for Implanted Ports.         hydrALAZINE (APRESOLINE) injection  Freq: As Needed Route: IV  Start: 11/14/22 1533 End: 11/14/22 1557    1533   1557-D/C'd         hydrALAZINE (APRESOLINE) injection 5 mg  Dose: 5 mg  Freq: Every 10 Minutes PRN Route: IV  PRN Reason: High Blood Pressure  PRN Comment: for systolic blood pressure greater than 180 mmHg or diastolic blood pressure greater than 105 mmHg  Start: 11/14/22 1541 End: 11/14/22 1815   Admin Instructions:   Up to 20 mg.  Caution: Look alike/sound alike drug alert    1815-D/C'd          HYDROcodone-acetaminophen (NORCO) 7.5-325 MG per tablet 1 tablet  Dose: 1 tablet  Freq: Once As Needed Route: PO  PRN Reason: Moderate Pain  Start: 11/14/22 1541 End: 11/14/22 1653   Admin Instructions:   [LALITA]    Do not exceed 4 grams of acetaminophen in a 24 hr period. Max dose of 2gm for AST/ALT greater than 120 units/L        If given for pain, use the following pain scale:   Mild Pain = Pain Score of 1-3, CPOT 1-2  Moderate Pain = Pain Score of 4-6, CPOT 3-4  Severe Pain = Pain Score of 7-10, CPOT 5-8    6988            HYDROmorphone (DILAUDID) injection  Freq: As Needed Route: IV  Start: 11/14/22  1415 End: 11/14/22 1557    1415   1557-D/C'd          HYDROmorphone (DILAUDID) injection 0.25 mg  Dose: 0.25 mg  Freq: Every 3 Hours PRN Route: IV  PRN Reason: Severe Pain  Start: 11/14/22 1816 End: 11/24/22 1815   Admin Instructions:   If given for pain, use the following pain scale:  Mild Pain = Pain Score of 1-3, CPOT 1-2  Moderate Pain = Pain Score of 4-6, CPOT 3-4  Severe Pain = Pain Score of 7-10, CPOT 5-8     0044   0926          And  naloxone (NARCAN) injection 0.4 mg  Dose: 0.4 mg  Freq: Every 5 Minutes PRN Route: IV  PRN Reason: Respiratory Depression  Start: 11/14/22 1816   Admin Instructions:   If respiratory rate is less than 8 breaths/minute or patient is difficult to arouse stop any narcotics and contact physician.   Administer slow IV push. Repeat as ordered until patient's respiratory rate is greater than 12 breaths/minute.         HYDROmorphone (DILAUDID) injection 0.5 mg  Dose: 0.5 mg  Freq: Every 5 Minutes PRN Route: IV  PRN Reasons: Moderate Pain,Severe Pain  Start: 11/14/22 1541 End: 11/14/22 1815   Admin Instructions:   May alternate fentanyl with hydromorphone using fentanyl first.    Maximum total dose of hydromorphone is 2 mg.  If given for pain, use the following pain scale:  Mild Pain = Pain Score of 1-3, CPOT 1-2  Moderate Pain = Pain Score of 4-6, CPOT 3-4  Severe Pain = Pain Score of 7-10, CPOT 5-8    1620 [C]   1645   1815-D/C'd        indocyanine green (IC-GREEN) injection  Freq: As Needed Route: IV  Start: 11/14/22 1449 End: 11/14/22 1557    1449   1508   1557-D/C'd        labetalol (NORMODYNE,TRANDATE) injection  Freq: As Needed Route: IV  Start: 11/14/22 1416 End: 11/14/22 1557    1416   1433   1557-D/C'd        labetalol (NORMODYNE,TRANDATE) injection 5 mg  Dose: 5 mg  Freq: Every 5 Minutes PRN Route: IV  PRN Reason: High Blood Pressure  PRN Comment: for systolic blood pressure greater than 180 mmHg or diastolic blood pressure greater than 105 mmHg  Start: 11/14/22 1541 End:  11/14/22 1815   Admin Instructions:   Hold for heart rate less than 60.  Give IV Push over 2 minutes.    1815-D/C'd          lactated ringers infusion  Freq: Continuous PRN Route: IV  Start: 11/14/22 1328 End: 11/14/22 1557    1328   1451   1557-D/C'd        lactated ringers infusion  Rate: 9 mL/hr Dose: 9 mL/hr  Freq: Continuous PRN Route: IV  PRN Comment: Start prior to surgery  Start: 11/14/22 1213 End: 11/14/22 1815    1815-D/C'd          lidocaine (XYLOCAINE) 2% injection  Freq: As Needed Route: IV  Start: 11/03/22 1310 End: 11/03/22 1326         lidocaine PF 1% (XYLOCAINE) injection 0.5 mL  Dose: 0.5 mL  Freq: Once As Needed Route: IJ  PRN Comment: IV Start  Start: 11/03/22 1307 End: 11/03/22 1613         lidocaine PF 2% (XYLOCAINE) injection  Freq: As Needed Route: INFILTRATION  Start: 11/14/22 1337 End: 11/14/22 1557    1337   1557-D/C'd         LORazepam (ATIVAN) tablet 0.5 mg  Dose: 0.5 mg  Freq: Every 8 Hours PRN Route: PO  PRN Reason: Anxiety  PRN Comment: muscle spasms  Start: 11/14/22 1816 End: 11/21/22 1815   Admin Instructions:    Caution: Look alike/sound alike drug alert      0627          midazolam (VERSED) injection 1 mg  Dose: 1 mg  Freq: Every 10 Minutes PRN Route: IV  PRN Comment: Anxiety prophylaxis, Pre-op comfort  Start: 11/14/22 1213 End: 11/14/22 1250   Admin Instructions:   May repeat dose in 10 minutes one time then contact provider for additional orders.      1236   1250           midazolam (VERSED) injection 1 mg  Dose: 1 mg  Freq: Every 10 Minutes PRN Route: IV  PRN Comment: Anxiety prophylaxis, Pre-op comfort  Start: 11/03/22 1307 End: 11/03/22 1613   Admin Instructions:   May repeat dose in 10 minutes one time then contact provider for additional orders.           naloxone (NARCAN) injection 0.2 mg  Dose: 0.2 mg  Freq: As Needed Route: IV  PRN Reasons: Opioid Reversal,Respiratory Depression  PRN Comment: unresponsiveness, decrease oxygen saturation  Indications of Use: ACUTE  RESPIRATORY FAILURE,OPIOID-INDUCED RESPIRATORY DEPRESSION  Start: 11/14/22 1541 End: 11/14/22 1815   Admin Instructions:   Notify Anesthesia if given    1815-D/C'd          neostigmine (PROSTIGMINE) injection solution  Freq: As Needed Route: IV  Start: 11/14/22 1536 End: 11/14/22 1557    1536   1557-D/C'd         nitroglycerin (NITROSTAT) SL tablet 0.4 mg  Dose: 0.4 mg  Freq: Every 5 Minutes PRN Route: SL  PRN Reason: Chest Pain  PRN Comment: Systolic BP Greater Than 100  Start: 11/14/22 1816   Admin Instructions:   Notify Provider if Pain Unrelieved After 3 Doses         ondansetron (ZOFRAN) injection  Freq: As Needed Route: IV  Start: 11/14/22 1420 End: 11/14/22 1557    1420 1557-D/C'd         ondansetron (ZOFRAN) injection 4 mg  Dose: 4 mg  Freq: Every 6 Hours PRN Route: IV  PRN Reasons: Nausea,Vomiting  Start: 11/14/22 1816         ondansetron (ZOFRAN) injection 4 mg  Dose: 4 mg  Freq: Once As Needed Route: IV  PRN Reasons: Nausea,Vomiting  Indications of Use: POSTOPERATIVE NAUSEA AND VOMITING  Start: 11/14/22 1541 End: 11/14/22 1657   Admin Instructions:   If BOTH ondansetron (ZOFRAN) and promethazine (PHENERGAN) are ordered use ondansetron first and THEN promethazine IF ondansetron is ineffective.    1657            oxyCODONE-acetaminophen (PERCOCET) 7.5-325 MG per tablet 1 tablet  Dose: 1 tablet  Freq: Every 4 Hours PRN Route: PO  PRN Reason: Severe Pain  Start: 11/14/22 1541 End: 11/14/22 1815   Admin Instructions:   [LALITA]    Do not exceed 4 grams of acetaminophen in a 24 hr period. Max dose of 2gm for AST/ALT greater than 120 units/L        If given for pain, use the following pain scale:   Mild Pain = Pain Score of 1-3, CPOT 1-2  Moderate Pain = Pain Score of 4-6, CPOT 3-4  Severe Pain = Pain Score of 7-10, CPOT 5-8    1815-D/C'd           promethazine (PHENERGAN) suppository 25 mg  Dose: 25 mg  Freq: Once As Needed Route: RE  PRN Reasons: Nausea,Vomiting  Start: 11/14/22 1541 End: 11/14/22 1815   Admin  Instructions:   If BOTH ondansetron (ZOFRAN) and promethazine (PHENERGAN) are ordered use ondansetron first and THEN promethazine IF ondansetron is ineffective.    1815-D/C'd          Or  promethazine (PHENERGAN) tablet 25 mg  Dose: 25 mg  Freq: Once As Needed Route: PO  PRN Reasons: Nausea,Vomiting  Start: 11/14/22 1541 End: 11/14/22 1815   Admin Instructions:   If BOTH ondansetron (ZOFRAN) and promethazine (PHENERGAN) are ordered use ondansetron first and THEN promethazine IF ondansetron is ineffective.      1815-D/C'd          Propofol (DIPRIVAN) injection  Freq: As Needed Route: IV  Start: 11/14/22 1337 End: 11/14/22 1557    1337   1557-D/C'd         Propofol (DIPRIVAN) injection  Freq: As Needed Route: IV  Start: 11/03/22 1312 End: 11/03/22 1326         rocuronium (ZEMURON) injection  Freq: As Needed Route: IV  Start: 11/14/22 1337 End: 11/14/22 1557    1337   1417   1459     1557-D/C'd          sodium chloride (NS) irrigation solution  Freq: As Needed  Start: 11/14/22 1404 End: 11/14/22 1553    1404 [C]   1553-D/C'd         sodium chloride 0.9 % flush 10 mL  Dose: 10 mL  Freq: As Needed Route: IV  PRN Reason: Line Care  Start: 11/14/22 1213 End: 11/14/22 1554    1554-D/C'd          sodium chloride 0.9 % flush 10 mL  Dose: 10 mL  Freq: As Needed Route: IV  PRN Reason: Line Care  Start: 11/01/22 0836 End: 11/01/22 1114   Admin Instructions:   Line Care after Medication Administratrion and prior to heparin flush/de-access.         sodium chloride 0.9 % flush 3-10 mL  Dose: 3-10 mL  Freq: As Needed Route: IV  PRN Reason: Line Care  Start: 11/03/22 1307 End: 11/03/22 1613         sugammadex (BRIDION) injection  Freq: As Needed Route: IV  Start: 11/14/22 1549 End: 11/14/22 1558    1549   1558-D/C'd         Medications 11/14/22 11/15/22 11/16/22

## 2022-11-16 NOTE — CASE MANAGEMENT/SOCIAL WORK
Continued Stay Note  [facility]     Patient Name: Carole Weaver  MRN: [id number]  Today's Date: 11/16/2022    Admit Date: 11/14/2022    Plan: Home with family and [employer] to provide wound vac and will deliver to once approved by [employer]. Family to transport.   Discharge Plan     Row Name 11/16/22 1732       Plan    Plan Home with family and [employer] to provide wound vac and will deliver to once approved by [employer]. Family to transport.    Patient/Family in Agreement with Plan yes    Plan Comments Called and spoke to Angie x2 today. Per Angie, [employer] has not yet approved wound vac. Will follow up in AM. Lebron Muniz RN-BC               Discharge Codes    No documentation.               Expected Discharge Date and Time     Expected Discharge Date Expected Discharge Time    Nov 17, 2022             Lebron Muniz RN

## 2022-11-16 NOTE — PROGRESS NOTES
No bowel movement or flatus  On home medications  Felt full early with lunch and set it aside  Abdomen soft slightly distended    Status post ileostomy takedown  Discussed with patient that she had a lot of inspissated stool in the terminal ileum and cecum that I tried to milk down and surgery.  This in addition to the colon not having been used for a couple of years may cause an ileus.  I encourage patient to ambulate and not to force feed.

## 2022-11-16 NOTE — PLAN OF CARE
Goal Outcome Evaluation:  Plan of Care Reviewed With: patient        Progress: no change  Outcome Evaluation: Pt c/o abd cramping and feeling full, still no flatus or BM today. Milk of mag given this morning and then IV reglan started this afternoon. +ambulation in hallway. Still waiting on home wound vac to be delivered also. Family at bedside.     1700-+flatus and BM

## 2022-11-16 NOTE — PROGRESS NOTES
Progress Note    Pod 2      S: Tolerating regular diet. Pain controlled with ERAS. Voiding spontaneously without difficulty. Positive ambulation. No BM.     O:  Temp:  [97.6 °F (36.4 °C)-98.3 °F (36.8 °C)] 98.3 °F (36.8 °C)  Heart Rate:  [] 101  Resp:  [18] 18  BP: ()/(55-89) 96/89      Intake/Output Summary (Last 24 hours) at 11/16/2022 0804  Last data filed at 11/16/2022 0000  Gross per 24 hour   Intake 480 ml   Output --   Net 480 ml       Abd:   soft, non-distended  Wound-vac in place in [facility].      Results from last 7 days   Lab Units 11/16/22  0438   WBC 10*3/mm3 8.28   HEMOGLOBIN g/dL 13.0   HEMATOCRIT % 36.7   PLATELETS 10*3/mm3 184       A/P: 42 y.o. female with s/p ILEOSTOMY TAKEDOWN     - Awaiting wound-vac for home use to be delivered.  - Will give dose of Milk of Mg to stimulate bowel function.

## 2022-11-16 NOTE — PLAN OF CARE
Goal Outcome Evaluation:              Outcome Evaluation: Pt up and ambulatory independently around the nurses station. Tolerating po well and pain controlled with ERAS. BS active x4, no BM as of yet. Wound vac with minimal drainage. WIll continue to monitor.

## 2022-11-17 ENCOUNTER — READMISSION MANAGEMENT (OUTPATIENT)
Dept: CALL CENTER | Facility: HOSPITAL | Age: 42
End: 2022-11-17

## 2022-11-17 ENCOUNTER — HOME HEALTH ADMISSION (OUTPATIENT)
Dept: HOME HEALTH SERVICES | Facility: HOME HEALTHCARE | Age: 42
End: 2022-11-17
Payer: COMMERCIAL

## 2022-11-17 VITALS
TEMPERATURE: 98.9 F | HEART RATE: 99 BPM | DIASTOLIC BLOOD PRESSURE: 79 MMHG | RESPIRATION RATE: 18 BRPM | OXYGEN SATURATION: 95 % | SYSTOLIC BLOOD PRESSURE: 142 MMHG

## 2022-11-17 DIAGNOSIS — Z85.048 HISTORY OF RECTAL CANCER: Primary | ICD-10-CM

## 2022-11-17 PROCEDURE — 25010000002 HYDROMORPHONE PER 4 MG: Performed by: COLON & RECTAL SURGERY

## 2022-11-17 PROCEDURE — 25010000002 ENOXAPARIN PER 10 MG: Performed by: COLON & RECTAL SURGERY

## 2022-11-17 PROCEDURE — 99024 POSTOP FOLLOW-UP VISIT: CPT | Performed by: PHYSICIAN ASSISTANT

## 2022-11-17 PROCEDURE — 25010000002 METOCLOPRAMIDE PER 10 MG: Performed by: PHYSICIAN ASSISTANT

## 2022-11-17 RX ORDER — IBUPROFEN 600 MG/1
600 TABLET ORAL EVERY 6 HOURS PRN
Qty: 60 TABLET | Refills: 2
Start: 2022-11-17 | End: 2023-01-17

## 2022-11-17 RX ORDER — ACETAMINOPHEN 500 MG
1000 TABLET ORAL EVERY 6 HOURS
Start: 2022-11-17 | End: 2023-01-17

## 2022-11-17 RX ORDER — OXYCODONE HYDROCHLORIDE 5 MG/1
5 TABLET ORAL EVERY 4 HOURS PRN
Qty: 24 TABLET | Refills: 0 | Status: SHIPPED | OUTPATIENT
Start: 2022-11-17 | End: 2023-01-06

## 2022-11-17 RX ORDER — DICYCLOMINE HYDROCHLORIDE 10 MG/1
10 CAPSULE ORAL
Status: DISCONTINUED | OUTPATIENT
Start: 2022-11-17 | End: 2022-11-17 | Stop reason: HOSPADM

## 2022-11-17 RX ORDER — GABAPENTIN 400 MG/1
400 CAPSULE ORAL EVERY 8 HOURS SCHEDULED
Qty: 42 CAPSULE | Refills: 0 | Status: SHIPPED | OUTPATIENT
Start: 2022-11-17 | End: 2023-01-06

## 2022-11-17 RX ADMIN — FAMOTIDINE 20 MG: 20 TABLET ORAL at 08:31

## 2022-11-17 RX ADMIN — ENOXAPARIN SODIUM 100 MG: 100 INJECTION SUBCUTANEOUS at 08:31

## 2022-11-17 RX ADMIN — HYDROMORPHONE HYDROCHLORIDE 0.25 MG: 1 INJECTION, SOLUTION INTRAMUSCULAR; INTRAVENOUS; SUBCUTANEOUS at 09:17

## 2022-11-17 RX ADMIN — CELECOXIB 200 MG: 200 CAPSULE ORAL at 08:31

## 2022-11-17 RX ADMIN — METOCLOPRAMIDE 10 MG: 5 INJECTION, SOLUTION INTRAMUSCULAR; INTRAVENOUS at 08:31

## 2022-11-17 RX ADMIN — LORAZEPAM 0.5 MG: 0.5 TABLET ORAL at 08:30

## 2022-11-17 RX ADMIN — ACETAMINOPHEN 1000 MG: 500 TABLET ORAL at 12:09

## 2022-11-17 RX ADMIN — ACETAMINOPHEN 1000 MG: 500 TABLET ORAL at 06:40

## 2022-11-17 RX ADMIN — CETIRIZINE HYDROCHLORIDE 10 MG: 10 TABLET ORAL at 08:31

## 2022-11-17 RX ADMIN — GABAPENTIN 400 MG: 400 CAPSULE ORAL at 06:40

## 2022-11-17 NOTE — DISCHARGE SUMMARY
Date of Admission: 11/14/2022  Date of Discharge:   11/17/22     Presenting Problem/History of Present Illness  History of rectal cancer [Z85.048]     Discharge Diagnosis:   Past Medical History:   Diagnosis Date   • Abdominopelvic abscess (HCC) 08/12/2020    ADMITTED TO [facility]   • Abnormal Pap smear of cervix 02/02/1998    JULIUS I   • Anemia in pregnancy    • Anxiety    • Bilateral epiphora 04/2020   • Bilateral hand swelling 05/02/2020    SEEN AT [facility] ER   • Cervical lymphadenitis 06/06/2012    SEEN AT [facility] ER   • Chemotherapy induced diarrhea 01/2021   • Chemotherapy induced neutropenia (HCC) 04/2020   • Chemotherapy-induced nausea 02/2020   • Chemotherapy-induced thrombocytopenia 05/2020   • Chronic diarrhea    • Colon polyps     FOLLOWED BY DR. 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 ALEXANDRU HUNT   • History of gestational diabetes    • History of pre-eclampsia 1998   • History of radiation therapy 2019    FOLLOWED BY DR. JAVON LEWIS   • History of TB skin testing 01/17/2009    TB Skin Test   • History of transfusion 2019 12/2019   • HPV in female 1998   • Hypokalemia 09/2019   • Hypomagnesemia 09/2019   • Hyponatremia 06/2021   • IBS (irritable bowel syndrome)    • Ileostomy in place ([facility])     FOLLOWED BY DR. GERONIMO ESPARZA   • Infected insect bite of neck 05/27/2016    SEEN AT [facility]   • Kidney stones 08/09/2007    SEEN AT [facility] ER   • Lump of right breast      SWOLLEN LYMPH NODE   • Lung cancer (HCC) 2020    METASTATIC LUNG CANCER   • Macrocytosis 2021   • Monopolar depression (HCC)    • On anticoagulant therapy    • Pain associated with surgical procedure 2020   • Palmar-plantar erythrodysesthesia 2021   • Perirectal abscess 2020   • Pulmonary embolism without acute cor pulmonale (HCC) 09/20/2019    X 3; ADMITTED TO [facility]   • Pulmonary nodule, right 2020   • Rectal cancer ([facility]) 2019    STAGE IIA, INVASIVE MODERATELY DIFFERENTIATED ADENOCARCINOMA, CHEMO AND XRT FINISHED 2019   • Right shoulder pain 2020    SEEN AT [facility] ER   • Right ureteral stone 10/01/2019    SEEN AT [facility] ER   • SAB (spontaneous ) 1996     A2-1 INDUCED   • Sciatica    • Sepsis due to cellulitis ([facility]) 2002    LEFT GREAT TOE, ADMITTED TO [facility]   • Tachycardia 2020   • 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 female presented for an ileostomy takedown. Follow her procedure she was started on a clear liquid diet and advanced to a regular diet. Tolerating PO intake. Having bowel and bladder function. Pain controlled with PO medications. Positive ambulation. Wound-vac applied to RLQ wound from previous ostomy site. Pt comfortable being D/C home.        Consults:   Consults     No orders found from 10/16/2022 to 11/15/2022.            Discharge Disposition  Home or Self Care    Discharge Medications     Discharge Medications      New Medications      Instructions Start Date   acetaminophen 500 MG tablet  Commonly known as: TYLENOL   1,000 mg, Oral, Every 6 Hours      gabapentin 400 MG capsule  Commonly known as: NEURONTIN   400 mg, Oral, Every 8 Hours Scheduled      ibuprofen 600 MG tablet  Commonly known as: ADVIL,MOTRIN   600 mg, Oral, Every 6 Hours PRN       oxyCODONE 5 MG immediate release tablet  Commonly known as: Roxicodone   5 mg, Oral, Every 4 Hours PRN         Changes to Medications      Instructions Start Date   clonazePAM 1 MG tablet  Commonly known as: KlonoPIN  What changed: See the new instructions.   TAKE 1 TABLET BY MOUTH 3 TIMES A DAY AS NEEDED FOR ANXIETY OR SEIZURES      famotidine 20 MG tablet  Commonly known as: PEPCID  What changed: when to take this   TAKE 1 TABLET BY MOUTH TWICE A DAY         Continue These Medications      Instructions Start Date   dicyclomine 20 MG tablet  Commonly known as: BENTYL   TAKE 1 TABLET BY MOUTH EVERY 6 HOURS AS NEEDED      Enoxaparin Sodium 100 MG/ML solution prefilled syringe syringe  Commonly known as: LOVENOX   1 mg/kg (100 mg), Subcutaneous, Every 12 Hours Scheduled      Loratadine 10 MG capsule   10 mg, Oral, Every Evening      metoprolol succinate XL 25 MG 24 hr tablet  Commonly known as: TOPROL-XL   12.5 mg, Oral, Nightly      ondansetron 8 MG tablet  Commonly known as: ZOFRAN   8 mg, Oral, 3 Times Daily PRN      promethazine 25 MG tablet  Commonly known as: PHENERGAN   25 mg, Oral, Every 6 Hours PRN      VITAMIN B-12 PO   1 tablet, Oral, Daily, HOLD FOR SURGERY             Discharge Diet:   Diet Instructions     Diet: Regular      Discharge Diet: Regular          Activity at Discharge:   Activity Instructions     Driving Restrictions      Type of Restriction: Driving    Driving Restrictions: No Driving Until Next Appointment    Gradually Increase Activity Until at Pre-Hospitalization Level      Lifting Restrictions      Type of Restriction: Lifting    Lifting Restrictions: Lifting Restriction (Indicate Limit)    Weight Limit (Pounds): 15    Length of Lifting Restriction: until office appt          Follow-up Appointments  Future Appointments   Date Time Provider Department Center   12/2/2022  9:00 AM Charissa Messina PA-C [facility]  [facility]   12/9/2022  2:00 PM Deepika Vela III, NP-C [facility]  [facility]   12/12/2022  8:30 AM INFU CBC KRE PORT CHAIR BH INFUS KRE LAG   1/5/2023  7:00 AM T[facility] LCG ECHO/VAS FRONT RM  LCG ECHO [facility]   1/10/2023  9:00 AM TREVON UCM CT 1 BH TREVON CT M [place]   1/17/2023  9:30 AM INFU CBC KRE PORT CHAIR  INFUS KRE LAG   1/17/2023 10:00 AM Dong Nguyen MD PhD [facility] CBC [facility]   4/20/2023  7:40 AM Tasha Bustos MD [facility] CD LCGKR [facility]     Additional Instructions for the Follow-ups that You Need to Schedule     Ambulatory Referral to Home Health ([facility])   As directed      Face to Face Visit Date: 11/17/2022    Follow-up provider for Plan of Care?: I will be treating the patient on an ongoing basis.  Please send me the Plan of Care for signature.    Follow-up provider: GERONIMO ESPARZA [[id number]]    Reason/Clinical Findings: RLQ wound from previous ostomy site    Describe mobility limitations that make leaving home difficult: Post-op    Nursing/Therapeutic Services Requested: Skilled Nursing    Skilled nursing orders: Wound vac application and instruction    Frequency: 1 Week 1         Call MD With Problems / Concerns   As directed      Instructions: temp >101. Drainage from wound. vomitting.    Order Comments: Instructions: temp >101. Drainage from wound. vomitting.          Discharge Follow-up with Specified Provider: Ephraim; 2 Weeks   As directed      To: Ephraim    Follow Up: 2 Weeks               Discharge Diet:   As tolerated  Activity at Discharge:   As tolerated  Follow-up Appointments  2 wks

## 2022-11-17 NOTE — PROGRESS NOTES
[facility] will follow post hospital as ordered. Patient agreeable to service. Contact information confirmed. Home health to call mobile # [phone] to schedule home visits. Patient denies any current home health care.

## 2022-11-17 NOTE — PAYOR COMM NOTE
"Carole Weaver (42 y.o. Female)     DC SUMMARY FOR [id number]          Date of Birth   1980    Social Security Number       Address   [address] [address]    Home Phone   [phone]    MRN   [id number]       Baptism   Jew    Marital Status                               Admission Date   11/14/22    Admission Type   Elective    Admitting Provider   Padmaja Ye MD    Attending Provider       Department, Room/Bed   [address], [address]/1       Discharge Date   11/17/2022    Discharge Disposition   Home or Self Care    Discharge Destination                               Attending Provider: (none)   Allergies: No Known Allergies    Isolation: None   Infection: None   Code Status: CPR    Ht: 167.6 cm (66\")   Wt: 99.4 kg (219 lb 1.6 oz)    Admission Cmt: None   Principal Problem: History of rectal cancer [Z85.048]                 Active Insurance as of 11/14/2022     Primary Coverage     Payor Plan Insurance Group Employer/Plan Group    ANTHEM BLUE CROSS ANTHEM BLUE CROSS BLUE SHIELD PPO [id number]     Payor Plan Address Payor Plan Phone Number Payor Plan Fax Number Effective Dates    [address] [phone]  1/1/2018 - None Entered    [place] [address]       Subscriber Name Subscriber Birth Date Member ID       JUSTUS WEAVER 1980 [id number]                 Emergency Contacts      (Rel.) Home Phone Work Phone Mobile Phone    Justus Weaver (Spouse) [phone] -- [phone]    DENIS CRONIN (Mother) [phone] -- [phone]               Discharge Summary      Charissa Messina PA-C at 11/17/22 1107     Attestation signed by Padmaja Ye MD at 11/17/22 1406    I have reviewed this documentation and agree.                  Date of Admission: 11/14/2022  Date of Discharge:   11/17/22     Presenting Problem/History of Present Illness  History of rectal cancer [Z85.048]     Discharge Diagnosis:   Past Medical " History:   Diagnosis Date   • Abdominopelvic abscess (HCC) 08/12/2020    ADMITTED TO [facility]   • Abnormal Pap smear of cervix 02/02/1998    JULIUS I   • Anemia in pregnancy    • Anxiety    • Bilateral epiphora 04/2020   • Bilateral hand swelling 05/02/2020    SEEN AT [facility] ER   • Cervical lymphadenitis 06/06/2012    SEEN AT [facility] ER   • Chemotherapy induced diarrhea 01/2021   • Chemotherapy induced neutropenia ([facility]) 04/2020   • Chemotherapy-induced nausea 02/2020   • Chemotherapy-induced thrombocytopenia 05/2020   • Chronic diarrhea    • Colon polyps     FOLLOWED BY DR. GERONIMO ESPARZA   • Cystitis 04/24/2020    WITH DEHYDRATION, ADMITTED TO 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 ALEXANDRU HUNT   • History of gestational diabetes    • History of pre-eclampsia 1998   • History of radiation therapy 2019    FOLLOWED BY DR. JAVON LEWIS   • History of TB skin testing 01/17/2009    TB Skin Test   • History of transfusion 2019    12/2019   • HPV in female 1998   • Hypokalemia 09/2019   • Hypomagnesemia 09/2019   • Hyponatremia 06/2021   • IBS (irritable bowel syndrome)    • Ileostomy in place ([facility])     FOLLOWED BY DR. GEROINMO ESPARZA   • Infected insect bite of neck 05/27/2016    SEEN AT [facility]   • Kidney stones 08/09/2007    SEEN AT [facility] ER   • Lump of right breast     SWOLLEN LYMPH NODE   • Lung cancer ([facility]) 09/28/2020    METASTATIC LUNG CANCER   • Macrocytosis 07/2021   • Monopolar depression ([facility])    • On anticoagulant therapy    • Pain associated  with surgical procedure 2020   • Palmar-plantar erythrodysesthesia 2021   • Perirectal abscess 2020   • Pulmonary embolism without acute cor pulmonale ([facility]) 09/20/2019    X 3; ADMITTED TO [facility]   • Pulmonary nodule, right 2020   • Rectal cancer ([facility]) 2019    STAGE IIA, INVASIVE MODERATELY DIFFERENTIATED ADENOCARCINOMA, CHEMO AND XRT FINISHED 2019   • Right shoulder pain 2020    SEEN AT [facility] ER   • Right ureteral stone 10/01/2019    SEEN AT [facility] ER   • SAB (spontaneous ) 1996     A2-1 INDUCED   • Sciatica    • Sepsis due to cellulitis ([facility]) 2002    LEFT GREAT TOE, ADMITTED TO [facility]   • Tachycardia 2020   • 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 female presented for an ileostomy takedown. Follow her procedure she was started on a clear liquid diet and advanced to a regular diet. Tolerating PO intake. Having bowel and bladder function. Pain controlled with PO medications. Positive ambulation. Wound-vac applied to RLQ wound from previous ostomy site. Pt comfortable being D/C home.        Consults:   Consults     No orders found from 10/16/2022 to 11/15/2022.            Discharge Disposition  Home or Self Care    Discharge Medications     Discharge Medications      New Medications      Instructions Start Date   acetaminophen 500 MG tablet  Commonly known as: TYLENOL   1,000 mg, Oral, Every 6 Hours      gabapentin 400 MG capsule  Commonly known as: NEURONTIN   400 mg, Oral, Every 8 Hours Scheduled      ibuprofen 600 MG tablet  Commonly known as: ADVIL,MOTRIN   600 mg, Oral, Every 6 Hours PRN      oxyCODONE 5 MG immediate release tablet  Commonly known as: Roxicodone   5 mg, Oral, Every 4 Hours PRN         Changes to Medications      Instructions Start Date   clonazePAM 1 MG  tablet  Commonly known as: KlonoPIN  What changed: See the new instructions.   TAKE 1 TABLET BY MOUTH 3 TIMES A DAY AS NEEDED FOR ANXIETY OR SEIZURES      famotidine 20 MG tablet  Commonly known as: PEPCID  What changed: when to take this   TAKE 1 TABLET BY MOUTH TWICE A DAY         Continue These Medications      Instructions Start Date   dicyclomine 20 MG tablet  Commonly known as: BENTYL   TAKE 1 TABLET BY MOUTH EVERY 6 HOURS AS NEEDED      Enoxaparin Sodium 100 MG/ML solution prefilled syringe syringe  Commonly known as: LOVENOX   1 mg/kg (100 mg), Subcutaneous, Every 12 Hours Scheduled      Loratadine 10 MG capsule   10 mg, Oral, Every Evening      metoprolol succinate XL 25 MG 24 hr tablet  Commonly known as: TOPROL-XL   12.5 mg, Oral, Nightly      ondansetron 8 MG tablet  Commonly known as: ZOFRAN   8 mg, Oral, 3 Times Daily PRN      promethazine 25 MG tablet  Commonly known as: PHENERGAN   25 mg, Oral, Every 6 Hours PRN      VITAMIN B-12 PO   1 tablet, Oral, Daily, HOLD FOR SURGERY             Discharge Diet:   Diet Instructions     Diet: Regular      Discharge Diet: Regular          Activity at Discharge:   Activity Instructions     Driving Restrictions      Type of Restriction: Driving    Driving Restrictions: No Driving Until Next Appointment    Gradually Increase Activity Until at Pre-Hospitalization Level      Lifting Restrictions      Type of Restriction: Lifting    Lifting Restrictions: Lifting Restriction (Indicate Limit)    Weight Limit (Pounds): 15    Length of Lifting Restriction: until office appt          Follow-up Appointments  Future Appointments   Date Time Provider Department Center   12/2/2022  9:00 AM Charissa Messina PA-C [facility]  [facility]   12/9/2022  2:00 PM Deepika Vela III NPCarloC [facility] PC [facility]   12/12/2022  8:30 AM INFU CBC KRE PORT CHAIR  INFUS KRE LAG   1/5/2023  7:00 AM TREVON LCG ECHO/VAS FRONT RM  LCG ECHO [facility]   1/10/2023  9:00 AM TREVON UCM CT 1  TREVON CT M  [place]   1/17/2023  9:30 AM INFU CBC KRE PORT CHAIR  INFUS KRE LAG   1/17/2023 10:00 AM Dong Nguyen MD PhD [facility] CBC [facility]   4/20/2023  7:40 AM Tasha Bustos MD [facility] CD LCGKR [facility]     Additional Instructions for the Follow-ups that You Need to Schedule     Ambulatory Referral to Home Health (Hospital)   As directed      Face to Face Visit Date: 11/17/2022    Follow-up provider for Plan of Care?: I will be treating the patient on an ongoing basis.  Please send me the Plan of Care for signature.    Follow-up provider: GERONIMO ESPARZA [[id number]]    Reason/Clinical Findings: RLQ wound from previous ostomy site    Describe mobility limitations that make leaving home difficult: Post-op    Nursing/Therapeutic Services Requested: Skilled Nursing    Skilled nursing orders: Wound vac application and instruction    Frequency: 1 Week 1         Call MD With Problems / Concerns   As directed      Instructions: temp >101. Drainage from wound. vomitting.    Order Comments: Instructions: temp >101. Drainage from wound. vomitting.          Discharge Follow-up with Specified Provider: Ephraim; 2 Weeks   As directed      To: Ephraim    Follow Up: 2 Weeks               Discharge Diet:   As tolerated  Activity at Discharge:   As tolerated  Follow-up Appointments  2 wks        Electronically signed by Geronimo Esparza MD at 11/17/22 2896

## 2022-11-17 NOTE — PLAN OF CARE
Goal Outcome Evaluation:  Plan of Care Reviewed With: patient        Progress: improving  Outcome Evaluation: Vitals stable and no new complaints at this time. Wound vac in place. +BM this shift. ERAS continued. Family at bedside this shift. Waiting for wound vac to be delivered at home to D/C home. No other changes and pt resting comfortably at this time. WCM

## 2022-11-17 NOTE — PROGRESS NOTES
CRS attending  Patient had several bowel movements  The looser ones she did not have very good control with  Abdomen is soft  Wound VAC in place  Status post ileostomy takedown  We discussed wound VAC changes and pre med for them.  Trying to find out how to do lidocaine 1% into the sponge to aid in pain control  Discharge today once wound VAC is delivered  Gabapentin and oxycodone prescription sent to pharmacy already<BR >Patient okay to resume her regular medications at home

## 2022-11-17 NOTE — PROGRESS NOTES
Progress Note    Pod 3      S: Tolerating regular diet. No N/V. Having bowel and bladder function.     O:  Temp:  [98.3 °F (36.8 °C)-98.9 °F (37.2 °C)] 98.9 °F (37.2 °C)  Heart Rate:  [] 99  Resp:  [17-18] 18  BP: (124-153)/(75-93) 142/79      Intake/Output Summary (Last 24 hours) at 11/17/2022 0835  Last data filed at 11/17/2022 0007  Gross per 24 hour   Intake 0 ml   Output --   Net 0 ml       Abd:   soft, non-distended  Wound-vac in place over RLQ wound      A/P: 42 y.o. female with s/p ILEOSTOMY TAKEDOWN    - Awaiting delivery of wound-vac for home use. Anticipate D/C home when this arrives.

## 2022-11-17 NOTE — OUTREACH NOTE
Prep Survey    Flowsheet Row Responses   [facility] patient discharged from? [place]   Is LACE score < 7 ? No   Emergency Room discharge w/ pulse ox? No   Eligibility [facility]   Date of Admission 11/14/22   Date of Discharge 11/17/22   Discharge Disposition Home-Health Care Sv   Discharge diagnosis History of rectal cancer- ILEOSTOMY TAKEDOWN   Does the patient have one of the following disease processes/diagnoses(primary or secondary)? General Surgery   Does the patient have Home health ordered? Yes   What is the Home health agency?  [facility]    Is there a DME ordered? Yes   What DME was ordered? [employer] to provide wound vac    Prep survey completed? Yes          ANASTACIA CORREA - Registered Nurse

## 2022-11-17 NOTE — PLAN OF CARE
Goal Outcome Evaluation:  Plan of Care Reviewed With: patient        Progress: no change  Patient discharged home, placed on home wound vac.

## 2022-11-18 ENCOUNTER — TRANSITIONAL CARE MANAGEMENT TELEPHONE ENCOUNTER (OUTPATIENT)
Dept: CALL CENTER | Facility: HOSPITAL | Age: 42
End: 2022-11-18

## 2022-11-18 ENCOUNTER — HOME CARE VISIT (OUTPATIENT)
Dept: HOME HEALTH SERVICES | Facility: HOME HEALTHCARE | Age: 42
End: 2022-11-18
Payer: COMMERCIAL

## 2022-11-18 ENCOUNTER — TELEPHONE (OUTPATIENT)
Dept: SURGERY | Facility: CLINIC | Age: 42
End: 2022-11-18

## 2022-11-18 PROCEDURE — G0299 HHS/HOSPICE OF RN EA 15 MIN: HCPCS

## 2022-11-18 NOTE — CASE MANAGEMENT/SOCIAL WORK
Case Management Discharge Note      Final Note: Home with family and [facility]. [employer] wound Vac delivered to room prior to D/C. Lidocaine script filled through [facility]. Transport by private auto.    Provided Post Acute Provider List?: Refused  Refused Provider List Comment: Requests to use [facility] as she has used them in the past.  Provided Post Acute Provider Quality & Resource List?: Refused    Selected Continued Care - Discharged on 11/17/2022 Admission date: 11/14/2022 - Discharge disposition: Home or Self Care    Destination    No services have been selected for the patient.              Durable Medical Equipment Coordination complete.    Service Provider Selected Services Address Phone Fax Patient Preferred    [facility] [address] [address] [phone] --          Dialysis/Infusion    No services have been selected for the patient.              Home Medical Care Coordination complete.    Service Provider Selected Services Address Phone Fax Patient Preferred     [facility] [address] [facility] [address] [address] [phone] --          Therapy    No services have been selected for the patient.              Community Resources    No services have been selected for the patient.              Community & DME    No services have been selected for the patient.                  Transportation Services  Private: Car    Final Discharge Disposition Code: 06 - home with home health care   PROGRESS NOTE    DATE: 11/28/2020    SUBJECTIVE:   Migule Reyes is a 56 year old male who was admitted on 11/25/2020 with probable upper GI bleeding, alcohol intoxication.    Says he had a small emesis last night, none overnight.  No melena but stools are loose    CURRENT MEDICATIONS:  • pantoprazole  40 mg Oral 2 times per day   • sodium chloride (PF)  2 mL Intracatheter 2 times per day   • dilTIAZem  180 mg Oral Daily   • gabapentin  100 mg Oral TID   • cloNIDine  0.1 mg Oral 2 times per day         OBJECTIVE:  Visit Vitals  /68 (BP Location: RUE - Right upper extremity, Patient Position: Semi-Sprague's)   Pulse 88   Temp 98 °F (36.7 °C) (Oral)   Resp 18   Ht 6' (1.829 m)   Wt 108.1 kg   SpO2 98%   BMI 32.32 kg/m²              I/O last 3 completed shifts:  In: 958.5 [I.V.:958.5]  Out: -     PHYSICAL EXAM  General - Alert and awake. No acute distress, appears calm  HEENT -Sclerae anicteric. Lips are moist. Tongue and buccal mucosa moist. Neck is supple. No jugular venous distension.  Cardiovascular - Regular rate and rhythm. Normal S1, S2.  No gallops. No murmurs.  Pulmonary -Adequate respiratory effort. Lungs are clear to auscultation bilaterally. No wheezes. No crackles.  Abdomen - Soft.  No tenderness. No distention. Bowel sounds are normoactive.  Extremities - Warm and well perfused. No cyanosis or edema. Moves all four extremities.  Skin- No rashes or lesions.  Neuro - Alert and oriented to person, place and time. No focal deficits.    LABS:    Recent Labs   Lab 11/25/20 2030   SODIUM 141   POTASSIUM 4.1   CHLORIDE 107   CO2 28   BUN 15   CREATININE 0.48*   GLUCOSE 117*   ALBUMIN 2.7*   AST 78*   BILIRUBIN 1.5*       Recent Labs   Lab 11/27/20  2259 11/27/20  1608 11/27/20  0647  11/26/20  0637 11/25/20 2030   WBC  --   --   --   --  4.0* 4.5   HGB 8.2* 8.4* 8.2*   < > 9.3* 10.5*   HCT 24.9* 26.1* 25.0*   < > 28.3* 32.2*   PLT  --   --   --   --  71* 72*   MCV  --   --   --   --  94.6 95.5    < > =  values in this interval not displayed.       ASSESSMENT and PLAN:  1. Presumed upper GI bleed, hemoglobin drop has stabilized.  Discontinue PPI drip, start diet.  No need for GI consultation at this time  2. Chronic alcoholism, minimal evidence of withdrawal.  3. History of arrhythmia is, continue usual meds.  4. Acute blood loss anemia, no transfusion needed as yet.  He is agreeable if needed            Wilbur Franklin MD   ATTENDING PHYSICIAN   11/28/2020  10:26 AM

## 2022-11-18 NOTE — OUTREACH NOTE
Call Center TCM Note    Flowsheet Row Responses   [facility] patient discharged from? [place]   Does the patient have one of the following disease processes/diagnoses(primary or secondary)? General Surgery   TCM attempt successful? Yes   Call start time 1009   Revoked Reason Patient/Family Refused   Call end time 1010   Discharge diagnosis History of rectal cancer- ILEOSTOMY TAKEDOWN   Person spoke with today (if not patient) and relationship patient   Call end time 1010          Tony Lyons RN    11/18/2022, 10:14 EST

## 2022-11-18 NOTE — TELEPHONE ENCOUNTER
Caller: JOHANNA GONZALEZ   Relationship: SELF     Best call back number: [phone]    Who are you requesting to speak with (clinical staff, provider,  specific staff member): CLINICAL STAFF     What was the call regarding: PT IS REQUESTING TO SPEAK TO EITHER A PA OR DR. ESPARZA IN REGARDS TO ILEOSTOMY TAKEDOWN PERFORMED ON 11/14.  SHE IS EXPERIENCING MAJOR ISSUES EVERY TIME SHE ATTENDS RESTROOM(ABOUT EVERY 20 MINUTES).   UNABLE TO WT CALL.    Do you require a callback: YES, ASAP

## 2022-11-18 NOTE — TELEPHONE ENCOUNTER
Sent by [facility].    Phoned patient and relayed Physician assistance reply. Patient voiced understanding.

## 2022-11-20 VITALS
DIASTOLIC BLOOD PRESSURE: 88 MMHG | OXYGEN SATURATION: 97 % | TEMPERATURE: 97.9 F | SYSTOLIC BLOOD PRESSURE: 130 MMHG | RESPIRATION RATE: 18 BRPM | HEART RATE: 100 BPM

## 2022-11-20 RX ORDER — SULFAMETHOXAZOLE AND TRIMETHOPRIM 800; 160 MG/1; MG/1
1 TABLET ORAL 2 TIMES DAILY
Qty: 6 TABLET | Refills: 0 | Status: SHIPPED | OUTPATIENT
Start: 2022-11-20 | End: 2023-01-06

## 2022-11-21 ENCOUNTER — HOME CARE VISIT (OUTPATIENT)
Dept: HOME HEALTH SERVICES | Facility: HOME HEALTHCARE | Age: 42
End: 2022-11-21
Payer: COMMERCIAL

## 2022-11-21 PROCEDURE — G0299 HHS/HOSPICE OF RN EA 15 MIN: HCPCS

## 2022-11-22 ENCOUNTER — HOME CARE VISIT (OUTPATIENT)
Dept: HOME HEALTH SERVICES | Facility: HOME HEALTHCARE | Age: 42
End: 2022-11-22
Payer: COMMERCIAL

## 2022-11-22 VITALS
DIASTOLIC BLOOD PRESSURE: 70 MMHG | TEMPERATURE: 98.1 F | OXYGEN SATURATION: 98 % | RESPIRATION RATE: 18 BRPM | SYSTOLIC BLOOD PRESSURE: 118 MMHG | HEART RATE: 93 BPM

## 2022-11-22 NOTE — CASE COMMUNICATION
SUPPLIES ORDER FROM [employer]  ORDER #[id number]  NS #  SKIN PREP WIPES #  NON-STERILE GAUZE #  LOAF GAUZE   ADHESIVE REMOVER #

## 2022-11-23 ENCOUNTER — HOME CARE VISIT (OUTPATIENT)
Dept: HOME HEALTH SERVICES | Facility: HOME HEALTHCARE | Age: 42
End: 2022-11-23
Payer: COMMERCIAL

## 2022-11-23 PROCEDURE — G0299 HHS/HOSPICE OF RN EA 15 MIN: HCPCS

## 2022-11-23 NOTE — HOME HEALTH
Today's Visit:     RN to pt home to complete wound care and SN visit. Pt states she is doing much better than she was on Friday. She states that her diarrhea is starting to form up and it is not as loose as it was. She states it is definitely getting better day by day. She states that her wound is more tender but she knows that is to be expected as it heals as that is what happened last time with her wound vac. Pt also states that on Saturday she woke up with a UTI and she called physician on-call and was put on Bactrim BID x3 days. She states that symptoms are starting to get better but not yet gone but she has only had 1 day of antibiotics. Pt states no other changes. RN completed assessment and wound care per MD order. Pt tolerated well.     Plan for next visit: wound care; how is UTI?
no vomiting/no change in level of consciousness

## 2022-11-25 ENCOUNTER — HOME CARE VISIT (OUTPATIENT)
Dept: HOME HEALTH SERVICES | Facility: HOME HEALTHCARE | Age: 42
End: 2022-11-25
Payer: COMMERCIAL

## 2022-11-25 PROCEDURE — G0299 HHS/HOSPICE OF RN EA 15 MIN: HCPCS

## 2022-11-26 VITALS
SYSTOLIC BLOOD PRESSURE: 130 MMHG | OXYGEN SATURATION: 98 % | DIASTOLIC BLOOD PRESSURE: 70 MMHG | TEMPERATURE: 98.9 F | HEART RATE: 88 BPM | RESPIRATION RATE: 16 BRPM

## 2022-11-26 NOTE — HOME HEALTH
ABD WOUND VAC DRESSING CHANGE 3X/ WEEK    11/25/22- SN CHANGED ABD WOUND VAC DRESSING- SN INSTILLED 5 CC LIDOCAINE INTO BLACK SPONGE AND ALLOWED TO DWELL AS SN REMOVED DRAPE, SN REMOVED 1 PIECE BLACK FOAM , CLEANSED WOUND W/ SALINE THEN PLACED 1 PIECE BLACK FOAM. WOUND W/O S/S COMPLCIATIONS, PINK BASE, TAM EDGES, SCANT SEROUS DRNG, NONE NOTED IN CANNITER- PT TOLERATED W/O C/O DISCOMFORT

## 2022-11-27 DIAGNOSIS — F33.9 MONOPOLAR DEPRESSION: ICD-10-CM

## 2022-11-28 ENCOUNTER — TELEPHONE (OUTPATIENT)
Dept: SURGERY | Facility: CLINIC | Age: 42
End: 2022-11-28

## 2022-11-28 ENCOUNTER — HOME CARE VISIT (OUTPATIENT)
Dept: HOME HEALTH SERVICES | Facility: HOME HEALTHCARE | Age: 42
End: 2022-11-28
Payer: COMMERCIAL

## 2022-11-28 VITALS
OXYGEN SATURATION: 94 % | RESPIRATION RATE: 18 BRPM | TEMPERATURE: 97.7 F | HEART RATE: 80 BPM | SYSTOLIC BLOOD PRESSURE: 118 MMHG | DIASTOLIC BLOOD PRESSURE: 78 MMHG

## 2022-11-28 PROCEDURE — G0300 HHS/HOSPICE OF LPN EA 15 MIN: HCPCS

## 2022-11-28 RX ORDER — ESCITALOPRAM OXALATE 20 MG/1
TABLET ORAL
Qty: 90 TABLET | Refills: 3 | OUTPATIENT
Start: 2022-11-28

## 2022-11-28 NOTE — HOME HEALTH
Today's Visit:   RN to pt home to complete SN visit and wound care. Pt sitting in recliner and states overall she is doing well. She states she is trying some new foods to see how her bowels handle it and was able to eat grilled cheese last night. Pt does state she is concerned with Thanksgiving and really wants to be able to eat some. RN stated to pt that she can eat just have small portions and limit on the greasey foods. RN states to get a small muffin tin and limit portion sizes and that she doesn't have to eat everything at once to spread it out as not to engorge self, stomach, and bowels. Pt verbalizes understanding and states that she is going to try to have a good Thanksgiving. RN completed assessment and then changed wound vac and pt denies any questions or concerns.       Plan for next visit: wound vac change

## 2022-11-28 NOTE — TELEPHONE ENCOUNTER
2 DOSES OF FIBER. Daily.     BM are loose, tarry and with mucus, not watery nor any form of poop. Started last week Wednesday. Cramping.    Denied fever, abdominal pain, chills, rectal pain.    [phone].    Please advice.

## 2022-11-30 ENCOUNTER — HOME CARE VISIT (OUTPATIENT)
Dept: HOME HEALTH SERVICES | Facility: HOME HEALTHCARE | Age: 42
End: 2022-11-30
Payer: COMMERCIAL

## 2022-11-30 VITALS
TEMPERATURE: 98.1 F | DIASTOLIC BLOOD PRESSURE: 78 MMHG | HEART RATE: 87 BPM | OXYGEN SATURATION: 99 % | RESPIRATION RATE: 18 BRPM | SYSTOLIC BLOOD PRESSURE: 122 MMHG

## 2022-11-30 PROCEDURE — G0299 HHS/HOSPICE OF RN EA 15 MIN: HCPCS

## 2022-12-01 VITALS
DIASTOLIC BLOOD PRESSURE: 88 MMHG | SYSTOLIC BLOOD PRESSURE: 122 MMHG | TEMPERATURE: 97.5 F | RESPIRATION RATE: 18 BRPM | HEART RATE: 79 BPM | OXYGEN SATURATION: 97 %

## 2022-12-02 ENCOUNTER — OFFICE VISIT (OUTPATIENT)
Dept: SURGERY | Facility: CLINIC | Age: 42
End: 2022-12-02

## 2022-12-02 ENCOUNTER — HOME CARE VISIT (OUTPATIENT)
Dept: HOME HEALTH SERVICES | Facility: HOME HEALTHCARE | Age: 42
End: 2022-12-02
Payer: COMMERCIAL

## 2022-12-02 VITALS
WEIGHT: 214 LBS | RESPIRATION RATE: 16 BRPM | SYSTOLIC BLOOD PRESSURE: 128 MMHG | HEART RATE: 97 BPM | OXYGEN SATURATION: 98 % | DIASTOLIC BLOOD PRESSURE: 86 MMHG | BODY MASS INDEX: 34.54 KG/M2

## 2022-12-02 DIAGNOSIS — Z85.048 HISTORY OF RECTAL CANCER: Primary | ICD-10-CM

## 2022-12-02 DIAGNOSIS — R19.8 LOW ANTERIOR RESECTION SYNDROME: ICD-10-CM

## 2022-12-02 PROCEDURE — 99024 POSTOP FOLLOW-UP VISIT: CPT | Performed by: COLON & RECTAL SURGERY

## 2022-12-02 PROCEDURE — G0299 HHS/HOSPICE OF RN EA 15 MIN: HCPCS

## 2022-12-02 RX ORDER — DIPHENOXYLATE HYDROCHLORIDE AND ATROPINE SULFATE 2.5; .025 MG/1; MG/1
2 TABLET ORAL
COMMUNITY
Start: 2022-11-30 | End: 2023-02-13

## 2022-12-02 RX ORDER — NORTRIPTYLINE HYDROCHLORIDE 25 MG/1
25 CAPSULE ORAL NIGHTLY
Qty: 30 CAPSULE | Refills: 2 | Status: SHIPPED | OUTPATIENT
Start: 2022-12-02 | End: 2022-12-28

## 2022-12-02 NOTE — PROGRESS NOTES
Carole Weaver is a 42 y.o. female in for follow up of No diagnosis found.        /86 (BP Location: Left arm, Patient Position: Sitting, Cuff Size: Adult)   Pulse 97   Resp 16   Wt 97.1 kg (214 lb)   SpO2 98%   BMI 34.54 kg/m²   Body mass index is 34.54 kg/m².      PE:  Physical Exam      Assessment: No diagnosis found.     Plan:

## 2022-12-02 NOTE — PROGRESS NOTES
Carole Weaver is a 42 y.o. female in for follow up of History of rectal cancer    Low anterior resection syndrome    The patient reports the wound VAC is going well. She states she is going to have to cease the wound VAC. The patient reports she returns back to work on Tuesday and home health informed her  on Wednesday 11/30/2022 they can not come back once she returns to work. She states she will take the rest of the week off and talk to Dr. Ye. The patient reports she has to go back to work next week. The home health nurse felt the wound VAC would most likely need to be in place for at least another 3 weeks but after assessing the wound she states the would was shallow and the patient could start wet to dry dressings. After removal of the wound VAC today she will call home health and have them place a wet to dry dressing and also educate her on dressing changes. She states she is doing well, like she never had surgery. It is not bothering her at all.     She is struggling with frequent bowel movements, mostly at night. The patient reports she is up to 2 doses of fiber and 4 doses of prescription Lomotil a day. The patient reports she will go to the bathroom and she will sit there for an hour until she is finished, put on a depends, go out to the chair, and as soon as she is covered back up she has to go again. She is not getting much sleep at night and finally fells sleeps around 3:00 am this morning. Patient reports at first was super diarrhea, but recently it is really rubbery and she assumed it was all the mucus coming out. The patient reports it has not stopped, but last night it was a little more watery than not. The patient reports  there is nothing really formed and nothing reminiscent of an actual bowel movement. she felt that her bowel movements should have returned to normal or closure to normal by now. Patient reports she is eating once a day. She states she is trying to do high fiber foods, oatmeal,  white rice and toast. The patient reports she is terrified to do something fried. She states she has lost 6 pounds. She is really concerned she is getting a little dehydrated. She states the only discomfort she has is the rawness from the constant bowel movements. She is using Desitin and notes it does help. The patient reports when she wakes up in the morning it is not raw anymore after using Desitin.     She did have a UTI her first week post surgery, she was prescribed Bactrim. She feel due to the cross contamination she could get another UTI.     The patient reports she was on Lexapro but she stopped taking it at the end of 10/2022.     She states she is not taking the pain medication anymore. The patient reports she did take them a couple of times because her home health nurse told her to take them about an hour before she came over, she feels the lidocaine works better.    /86 (BP Location: Left arm, Patient Position: Sitting, Cuff Size: Adult)   Pulse 97   Resp 16   Wt 97.1 kg (214 lb)   SpO2 98%   BMI 34.54 kg/m²   Body mass index is 34.54 kg/m².      PE:  Physical Exam  Constitutional:       General: She is not in acute distress.     Appearance: She is well-developed.   HENT:      Head: Normocephalic and atraumatic.   Abdominal:      General: There is no distension.      Palpations: Abdomen is soft.   Musculoskeletal:         General: Normal range of motion.   Neurological:      Mental Status: She is alert.   Psychiatric:         Thought Content: Thought content normal.           Assessment:   1. History of rectal cancer    2. Low anterior resection syndrome         Plan:      Plan:  1. Wound, Right lower quadrant   There is a little bit of slough and the wound had healed around the VAC,  It had not been packed quite deep enough and so I was able to open it again to where they can pack it a little bit deeper to increase the success of the wound healing. Reassured patient the frequency of her  bowel movements will improve over time. Instructed her to increase the Lomotil  to 8 times per day, taking it1 30 minutes to 1 hour before she eat and before bed. Recommended she take Imodium 1 to 2 pills, 1 hour after she is finished eating. Also recommended if the is having cluster bowel movements she can do an enema to help flush it all out and maybe stop the cluster and so she can get more sleep. Discussed her diet and assured her the sooner she gets meyers to a regular diet the sooner her bowel movement will become more formed.     Patient to follow up in 2 weeks.      Transcribed from ambient dictation for Padmaja Ye MD by Carole Mcculloguh.  12/02/22   21:38 EST    Patient or patient representative verbalized consent to the visit recording.  I have personally performed the services described in this document as transcribed by the above individual, and it is both accurate and complete.

## 2022-12-02 NOTE — HOME HEALTH
Today's Visit:     SN to pt home to complete SN visit/ assessment and wound care. Pt sitting outside when RN arrives. Pt state she is doing well but did have trouble all weekend with differerent alarms going off on her wound vac. She states since LPN visited on Monday she hasn't had any issues. Pt also states she has been having issues with diarrhea and has a call into her provider. Pt also has a follow up doctors appt on Friday. Assessment completed; pt tolerated wound care well. Pt denies any questions or concerns.       Plan for next visit: wound care; how was f/u appt?

## 2022-12-04 VITALS
TEMPERATURE: 97.3 F | OXYGEN SATURATION: 94 % | RESPIRATION RATE: 18 BRPM | DIASTOLIC BLOOD PRESSURE: 84 MMHG | HEART RATE: 87 BPM | SYSTOLIC BLOOD PRESSURE: 120 MMHG

## 2022-12-05 ENCOUNTER — HOME CARE VISIT (OUTPATIENT)
Dept: HOME HEALTH SERVICES | Facility: HOME HEALTHCARE | Age: 42
End: 2022-12-05
Payer: COMMERCIAL

## 2022-12-05 PROCEDURE — G0299 HHS/HOSPICE OF RN EA 15 MIN: HCPCS

## 2022-12-05 NOTE — HOME HEALTH
Today's Visit:     RN to pt home to complete wound care once pt returned home from follow up with surgeon. Wound vac was removed by surgeon and this RN reapplied while in the home. Surgeon states pt is doing well. Pt had questioned surgeon regarding going back to work sometime next week and going to wet to dry dressing. Surgeon states for pt to continue with wound vac through the weekend and then SN to instruct pt/cgv on wet to dry dressing on Monday and then pt can be discharged on Wednesday once pt is competent in wound care and then may return to work. RN will make note that pre-discharge visit will be completed on Monday, 12/5/22 and then discharge visit on 12/7/22 so that pt may return to work. Surgeon is aware that pt will be discharged from home health once pt returns to work. Surgeon will monitor status of wound. Pt will change dressing BID per MD order. RN completed assessment and wound care per current order.       Plan for next visit: wound care-teach wet to dry dressing and change wound care order to wet to dry BID; Pre-discharge visit  Order supplies on Monday- pt will need woven 4x4's, ABD, and tape. Pt has pleanty of NS in the home.

## 2022-12-06 ENCOUNTER — HOME CARE VISIT (OUTPATIENT)
Dept: HOME HEALTH SERVICES | Facility: HOME HEALTHCARE | Age: 42
End: 2022-12-06
Payer: COMMERCIAL

## 2022-12-06 VITALS
OXYGEN SATURATION: 97 % | DIASTOLIC BLOOD PRESSURE: 90 MMHG | RESPIRATION RATE: 18 BRPM | TEMPERATURE: 97.7 F | SYSTOLIC BLOOD PRESSURE: 138 MMHG | HEART RATE: 80 BPM

## 2022-12-06 NOTE — CASE COMMUNICATION
"THE FOLLOWING SUPPLIES WERE ORDERED ON 12/6/22:    WOVEN STERILE 4X4 GAUZE - 28  ABD PADS - 15  2\" PAPER TAPE - ONLY 1 ALLOWED PER INSURANCE  LOAF GAUZE NOT ALLOWED - JUST ORDERED 11/22    ORDER #[id number]"

## 2022-12-07 ENCOUNTER — HOME CARE VISIT (OUTPATIENT)
Dept: HOME HEALTH SERVICES | Facility: HOME HEALTHCARE | Age: 42
End: 2022-12-07
Payer: COMMERCIAL

## 2022-12-07 PROCEDURE — G0299 HHS/HOSPICE OF RN EA 15 MIN: HCPCS

## 2022-12-08 VITALS
RESPIRATION RATE: 18 BRPM | OXYGEN SATURATION: 97 % | TEMPERATURE: 97.3 F | HEART RATE: 100 BPM | DIASTOLIC BLOOD PRESSURE: 90 MMHG | SYSTOLIC BLOOD PRESSURE: 144 MMHG

## 2022-12-12 ENCOUNTER — INFUSION (OUTPATIENT)
Dept: ONCOLOGY | Facility: HOSPITAL | Age: 42
End: 2022-12-12

## 2022-12-12 DIAGNOSIS — Z45.2 ENCOUNTER FOR FITTING AND ADJUSTMENT OF VASCULAR CATHETER: Primary | ICD-10-CM

## 2022-12-12 PROCEDURE — 96523 IRRIG DRUG DELIVERY DEVICE: CPT

## 2022-12-12 PROCEDURE — 25010000002 HEPARIN LOCK FLUSH PER 10 UNITS: Performed by: INTERNAL MEDICINE

## 2022-12-12 RX ORDER — HEPARIN SODIUM (PORCINE) LOCK FLUSH IV SOLN 100 UNIT/ML 100 UNIT/ML
500 SOLUTION INTRAVENOUS AS NEEDED
Status: DISCONTINUED | OUTPATIENT
Start: 2022-12-12 | End: 2022-12-12 | Stop reason: HOSPADM

## 2022-12-12 RX ORDER — SODIUM CHLORIDE 0.9 % (FLUSH) 0.9 %
10 SYRINGE (ML) INJECTION AS NEEDED
Status: DISCONTINUED | OUTPATIENT
Start: 2022-12-12 | End: 2022-12-12 | Stop reason: HOSPADM

## 2022-12-12 RX ADMIN — Medication 10 ML: at 08:18

## 2022-12-12 RX ADMIN — Medication 500 UNITS: at 08:18

## 2022-12-16 ENCOUNTER — OFFICE VISIT (OUTPATIENT)
Dept: SURGERY | Facility: CLINIC | Age: 42
End: 2022-12-16
Payer: COMMERCIAL

## 2022-12-16 VITALS
HEART RATE: 92 BPM | HEIGHT: 66 IN | SYSTOLIC BLOOD PRESSURE: 110 MMHG | DIASTOLIC BLOOD PRESSURE: 84 MMHG | OXYGEN SATURATION: 97 % | BODY MASS INDEX: 33.03 KG/M2 | TEMPERATURE: 97.3 F | WEIGHT: 205.5 LBS

## 2022-12-16 DIAGNOSIS — Z85.048 HISTORY OF RECTAL CANCER: Primary | ICD-10-CM

## 2022-12-16 PROCEDURE — 99024 POSTOP FOLLOW-UP VISIT: CPT | Performed by: COLON & RECTAL SURGERY

## 2022-12-16 NOTE — PROGRESS NOTES
Carole Weaver is a 42 y.o. female in for follow up of History of rectal cancer    The patient is a 42-year-old female who presents today for a wound check. She is doing better with bowel movements. She is getting some cues if it is loose, she will have some bowel incontinence. She is still getting some mucus.    /84 (BP Location: Right arm, Patient Position: Sitting, Cuff Size: Adult)   Pulse 92   Temp 97.3 °F (36.3 °C) (Temporal)   Ht 167.6 cm (66\")   Wt 93.2 kg (205 lb 8 oz)   LMP  (LMP Unknown)   SpO2 97%   Breastfeeding No   BMI 33.17 kg/m²   Body mass index is 33.17 kg/m².      PE:  Physical Exam  Wound is looking good. He has pink granulation tissue. There is a small area on the proximal side that has some fat necrosis. This was debrided and repacked with iodoform gauze.    Assessment:   1. History of rectal cancer         Plan:  I plan to see patient in 2 to 3 weeks. For the bowel movements we will change over to Pepto-Bismol instead of the Imodium 4 times per day and see if that helps.      Transcribed from ambient dictation for Padmaja Ye MD by Sheba Valerio.  12/16/22   10:46 EST    Patient or patient representative verbalized consent to the visit recording.  I have personally performed the services described in this document as transcribed by the above individual, and it is both accurate and complete.

## 2022-12-28 DIAGNOSIS — F41.9 ANXIETY: ICD-10-CM

## 2022-12-28 RX ORDER — NORTRIPTYLINE HYDROCHLORIDE 25 MG/1
25 CAPSULE ORAL NIGHTLY
Qty: 90 CAPSULE | Refills: 0 | Status: SHIPPED | OUTPATIENT
Start: 2022-12-28 | End: 2023-01-17

## 2022-12-29 RX ORDER — CLONAZEPAM 1 MG/1
1 TABLET ORAL 3 TIMES DAILY PRN
Qty: 90 TABLET | Refills: 0 | Status: SHIPPED | OUTPATIENT
Start: 2022-12-29 | End: 2023-02-13

## 2022-12-29 NOTE — TELEPHONE ENCOUNTER
Rx Refill Note  Requested Prescriptions     Pending Prescriptions Disp Refills   • clonazePAM (KlonoPIN) 1 MG tablet [Pharmacy Med Name: CLONAZEPAM 1 MG TABLET] 90 tablet 1     Sig: TAKE 1 TABLET BY MOUTH 3 TIMES A DAY AS NEEDED FOR ANXIETY OR SEIZURES      Last office visit with prescribing clinician: 7/29/2022   Last telemedicine visit with prescribing clinician: Visit date not found   Next office visit with prescribing clinician: Visit date not found                         Would you like a call back once the refill request has been completed: [] Yes [] No    If the office needs to give you a call back, can they leave a voicemail: [] Yes [] No    Ami Patterson MA  12/29/22, 08:16 EST

## 2023-01-01 NOTE — HOME HEALTH
RN to pt home to complete SN discharge visit. Pt states she is feeling OK but yesterday had some nausea and vomiting. Pt states she did not run a fever or anything else. Pt states her BM is starting to form up. She states that the frequency she is going is also gettin ga little better. Pt intends to return to work this week so pt is being discharged from home health. Pt and  has been doing wet to dry dressings BID as ordered without any difficulty. RN completed wound care today per MD order. RN reviewed s/s of non-healing wound and infection. Assessment was completed and discharge paper work was signed. None

## 2023-01-01 NOTE — ANESTHESIA PREPROCEDURE EVALUATION
Anesthesia Evaluation     Patient summary reviewed and Nursing notes reviewed                Airway   Mallampati: II  Dental      Pulmonary    (+) pulmonary embolism, a smoker,   Cardiovascular     ECG reviewed  PT is on anticoagulation therapy  Rhythm: regular  Rate: normal    (+) hypertension, valvular problems/murmurs murmur,       Neuro/Psych  (+) numbness, psychiatric history Depression and Anxiety,     GI/Hepatic/Renal/Endo    (+) morbid obesity, GERD, GI bleeding , renal disease stones,     Musculoskeletal (-) negative ROS    Abdominal    Substance History - negative use     OB/GYN    (+) pregnancy induced hypertension        Other      history of cancer                  Anesthesia Plan    ASA 3     general with block   (TAP blocks with exparel after induction)  intravenous induction     Anesthetic plan, all risks, benefits, and alternatives have been provided, discussed and informed consent has been obtained with: patient.       Passed

## 2023-01-06 ENCOUNTER — OFFICE VISIT (OUTPATIENT)
Dept: SURGERY | Facility: CLINIC | Age: 43
End: 2023-01-06
Payer: COMMERCIAL

## 2023-01-06 VITALS
HEIGHT: 66 IN | WEIGHT: 199.9 LBS | BODY MASS INDEX: 32.13 KG/M2 | TEMPERATURE: 97.5 F | OXYGEN SATURATION: 98 % | DIASTOLIC BLOOD PRESSURE: 70 MMHG | SYSTOLIC BLOOD PRESSURE: 110 MMHG | HEART RATE: 98 BPM

## 2023-01-06 DIAGNOSIS — Z85.048 HISTORY OF RECTAL CANCER: Primary | ICD-10-CM

## 2023-01-06 PROCEDURE — 99024 POSTOP FOLLOW-UP VISIT: CPT | Performed by: COLON & RECTAL SURGERY

## 2023-01-06 NOTE — PROGRESS NOTES
Carole Weaver is a 42 y.o. female in for follow up of history of rectal cancer.    HPI:  The patient reports that she is doing well. She states that her mucus has resolved, but she continues to have mild diarrhea. She notes that it is easier to clean up and she is not having much accidents. The patient reports that she has an urge to have a bowel movement in the middle of the night, but she has not had any accidents. She states that she has not been eating away from home. The patient reports that she had the flu on 12/26/2022. She states that she had chills and she was coughing up phlegm. The patient reports that she did not take her fiber for approximately 4 days. The patient reports that she did not have a bowel movement for 4 days. She states that she started having major abdominal cramps. The patient reports that she took an enema and her stool was formed. She notes that she had 3 to 4 bowel movements in a 89-aa-73-minute time frame. The patient states that on Monday 01/02/2023 she went to the market and ate. She reports that in Tuesday 01/03/2023, she began to have abdominal pain again. She states that she thought that maybe she did not get all of the stool out. She notes that she did another enema and got a lot of stool out. She states that half of the stool was formed, and after the enema she still had some diarrhea. The patient reports that she was taking Imodium and Lomotil. She states that she stopped taking the Lomotil on 12/31/2022. The patient states that on Wednesday 01/04/2023 she and her  went out to eat. She states that she ate a burger, fries and a milk shake.  The patient reports that she had diarrhea all night on 01/04/2023. She states that she had to cancel her echocardiogram on 01/05/2023 because she was still having diarrhea. The patient reports that she was taking 2 Imodium a day. She states that she takes 2 Lomotil before she eats and 1 at night. The patient reports that she has lost  "30 pounds. She states that she feels like she does not have the muscles to push out stool. The patient reports that she has been taking 1 to 2 Imodium every day.       /70 (BP Location: Left arm, Patient Position: Sitting, Cuff Size: Large Adult)   Pulse 98   Temp 97.5 °F (36.4 °C) (Temporal)   Ht 167.6 cm (66\")   Wt 90.7 kg (199 lb 14.4 oz)   LMP  (LMP Unknown)   SpO2 98%   BMI 32.26 kg/m²   Body mass index is 32.26 kg/m².      PE:  Physical Exam  Abdominal:      Comments: Wound is pink cylindrical wound with pink granulation tissue and it's 2 cm deep in the right lower quadrant.     Abdomen: Soft, non-tender, non-distended    Assessment: History of rectal cancer     Plan:  The patient will continue taking Lomotil and Imodium as needed. She will start physical therapy for pelvic floor strengthening. She will follow up in 4 weeks via telephone to discuss her bowel movements.    Transcribed from ambient dictation for Padmaja Ye MD by Priti Simpson.  01/06/23   10:43 EST    Patient or patient representative verbalized consent to the visit recording.    This patient was evaluated by me, recommendations made, documentation reviewed, edited, and revised by me, Padmaja Ye MD          "

## 2023-01-07 DIAGNOSIS — C20 ADENOCARCINOMA OF RECTUM: Chronic | ICD-10-CM
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
## 2023-01-09 RX ORDER — ONDANSETRON HYDROCHLORIDE 8 MG/1
TABLET, FILM COATED ORAL
Qty: 60 TABLET | Refills: 1 | Status: SHIPPED | OUTPATIENT
Start: 2023-01-09

## 2023-01-10 ENCOUNTER — INFUSION (OUTPATIENT)
Dept: ONCOLOGY | Facility: HOSPITAL | Age: 43
End: 2023-01-10
Payer: COMMERCIAL

## 2023-01-10 ENCOUNTER — HOSPITAL ENCOUNTER (OUTPATIENT)
Dept: PET IMAGING | Facility: HOSPITAL | Age: 43
Discharge: HOME | End: 2023-01-10
Admitting: INTERNAL MEDICINE
Payer: COMMERCIAL

## 2023-01-10 DIAGNOSIS — C78.02 MALIGNANT NEOPLASM METASTATIC TO BOTH LUNGS: ICD-10-CM

## 2023-01-10 DIAGNOSIS — C20 ADENOCARCINOMA OF RECTUM: ICD-10-CM

## 2023-01-10 DIAGNOSIS — C78.01 MALIGNANT NEOPLASM METASTATIC TO BOTH LUNGS: ICD-10-CM

## 2023-01-10 LAB
ALBUMIN SERPL-MCNC: 3.5 G/DL (ref 3.5–5.2)
ALBUMIN/GLOB SERPL: 1.1 G/DL (ref 1.1–2.4)
ALP SERPL-CCNC: 96 U/L (ref 38–116)
ALT SERPL W P-5'-P-CCNC: 26 U/L (ref 0–33)
ANION GAP SERPL CALCULATED.3IONS-SCNC: 9.5 MMOL/L (ref 5–15)
AST SERPL-CCNC: 18 U/L (ref 0–32)
BASOPHILS # BLD AUTO: 0.03 10*3/MM3 (ref 0–0.2)
BASOPHILS NFR BLD AUTO: 0.5 % (ref 0–1.5)
BILIRUB SERPL-MCNC: 0.3 MG/DL (ref 0.2–1.2)
BUN SERPL-MCNC: 7 MG/DL (ref 6–20)
BUN/CREAT SERPL: 10.6 (ref 7.3–30)
CALCIUM SPEC-SCNC: 9.1 MG/DL (ref 8.5–10.2)
CEA SERPL-MCNC: 0.99 NG/ML
CHLORIDE SERPL-SCNC: 106 MMOL/L (ref 98–107)
CO2 SERPL-SCNC: 24.5 MMOL/L (ref 22–29)
CREAT SERPL-MCNC: 0.66 MG/DL (ref 0.6–1.1)
DEPRECATED RDW RBC AUTO: 45 FL (ref 37–54)
EGFRCR SERPLBLD CKD-EPI 2021: 111.8 ML/MIN/1.73
EOSINOPHIL # BLD AUTO: 0.22 10*3/MM3 (ref 0–0.4)
EOSINOPHIL NFR BLD AUTO: 3.5 % (ref 0.3–6.2)
ERYTHROCYTE [DISTWIDTH] IN BLOOD BY AUTOMATED COUNT: 12.9 % (ref 12.3–15.4)
GLOBULIN UR ELPH-MCNC: 3.3 GM/DL (ref 1.8–3.5)
GLUCOSE SERPL-MCNC: 89 MG/DL (ref 74–124)
HCT VFR BLD AUTO: 45.4 % (ref 34–46.6)
HGB BLD-MCNC: 14.7 G/DL (ref 12–15.9)
IMM GRANULOCYTES # BLD AUTO: 0.01 10*3/MM3 (ref 0–0.05)
IMM GRANULOCYTES NFR BLD AUTO: 0.2 % (ref 0–0.5)
LYMPHOCYTES # BLD AUTO: 1.83 10*3/MM3 (ref 0.7–3.1)
LYMPHOCYTES NFR BLD AUTO: 29.5 % (ref 19.6–45.3)
MCH RBC QN AUTO: 30.8 PG (ref 26.6–33)
MCHC RBC AUTO-ENTMCNC: 32.4 G/DL (ref 31.5–35.7)
MCV RBC AUTO: 95 FL (ref 79–97)
MONOCYTES # BLD AUTO: 0.54 10*3/MM3 (ref 0.1–0.9)
MONOCYTES NFR BLD AUTO: 8.7 % (ref 5–12)
NEUTROPHILS NFR BLD AUTO: 3.58 10*3/MM3 (ref 1.7–7)
NEUTROPHILS NFR BLD AUTO: 57.6 % (ref 42.7–76)
NRBC BLD AUTO-RTO: 0 /100 WBC (ref 0–0.2)
PLATELET # BLD AUTO: 229 10*3/MM3 (ref 140–450)
PMV BLD AUTO: 8.9 FL (ref 6–12)
POTASSIUM SERPL-SCNC: 4 MMOL/L (ref 3.5–4.7)
PROT SERPL-MCNC: 6.8 G/DL (ref 6.3–8)
RBC # BLD AUTO: 4.78 10*6/MM3 (ref 3.77–5.28)
SODIUM SERPL-SCNC: 140 MMOL/L (ref 134–145)
WBC NRBC COR # BLD: 6.21 10*3/MM3 (ref 3.4–10.8)

## 2023-01-10 PROCEDURE — 85025 COMPLETE CBC W/AUTO DIFF WBC: CPT

## 2023-01-10 PROCEDURE — 25010000002 IOPAMIDOL 61 % SOLUTION: Performed by: INTERNAL MEDICINE

## 2023-01-10 PROCEDURE — 0 DIATRIZOATE MEGLUMINE & SODIUM PER 1 ML: Performed by: INTERNAL MEDICINE

## 2023-01-10 PROCEDURE — 82378 CARCINOEMBRYONIC ANTIGEN: CPT | Performed by: INTERNAL MEDICINE

## 2023-01-10 PROCEDURE — 71260 CT THORAX DX C+: CPT

## 2023-01-10 PROCEDURE — 74177 CT ABD & PELVIS W/CONTRAST: CPT

## 2023-01-10 PROCEDURE — 80053 COMPREHEN METABOLIC PANEL: CPT

## 2023-01-10 RX ADMIN — IOPAMIDOL 85 ML: 612 INJECTION, SOLUTION INTRAVENOUS at 12:40

## 2023-01-10 RX ADMIN — DIATRIZOATE MEGLUMINE AND DIATRIZOATE SODIUM 30 ML: 660; 100 LIQUID ORAL; RECTAL at 11:46

## 2023-01-11 RX ORDER — BETAMETHASONE DIPROPIONATE 0.5 MG/G
1 CREAM TOPICAL 2 TIMES DAILY
Qty: 15 G | Refills: 0 | OUTPATIENT
Start: 2023-01-11

## 2023-01-12 ENCOUNTER — TELEPHONE (OUTPATIENT)
Dept: CARDIOLOGY | Facility: CLINIC | Age: 43
End: 2023-01-12

## 2023-01-12 ENCOUNTER — HOSPITAL ENCOUNTER (OUTPATIENT)
Dept: CARDIOLOGY | Facility: HOSPITAL | Age: 43
Discharge: HOME | End: 2023-01-12
Admitting: INTERNAL MEDICINE
Payer: COMMERCIAL

## 2023-01-12 VITALS
BODY MASS INDEX: 31.98 KG/M2 | DIASTOLIC BLOOD PRESSURE: 60 MMHG | OXYGEN SATURATION: 99 % | SYSTOLIC BLOOD PRESSURE: 110 MMHG | HEART RATE: 85 BPM | HEIGHT: 66 IN | WEIGHT: 199 LBS

## 2023-01-12 DIAGNOSIS — I25.10 CORONARY ARTERY CALCIFICATION: ICD-10-CM

## 2023-01-12 DIAGNOSIS — C20 ADENOCARCINOMA OF RECTUM: Chronic | ICD-10-CM

## 2023-01-12 DIAGNOSIS — I10 PRIMARY HYPERTENSION: Chronic | ICD-10-CM

## 2023-01-12 DIAGNOSIS — Z79.01 ON ANTICOAGULANT THERAPY: ICD-10-CM

## 2023-01-12 DIAGNOSIS — I25.84 CORONARY ARTERY CALCIFICATION: ICD-10-CM

## 2023-01-12 LAB
BH CV ECHO LEFT VENTRICLE GLOBAL LONGITUDINAL STRAIN: -19.2 %
BH CV ECHO MEAS - EDV(CUBED): 101.4 ML
BH CV ECHO MEAS - EDV(MOD-SP2): 70 ML
BH CV ECHO MEAS - EDV(MOD-SP4): 60 ML
BH CV ECHO MEAS - EF(MOD-BP): 52.4 %
BH CV ECHO MEAS - EF(MOD-SP2): 51.4 %
BH CV ECHO MEAS - EF(MOD-SP4): 51.7 %
BH CV ECHO MEAS - ESV(CUBED): 23.6 ML
BH CV ECHO MEAS - ESV(MOD-SP2): 34 ML
BH CV ECHO MEAS - ESV(MOD-SP4): 29 ML
BH CV ECHO MEAS - FS: 38.5 %
BH CV ECHO MEAS - IVS/LVPW: 0.9 CM
BH CV ECHO MEAS - IVSD: 0.79 CM
BH CV ECHO MEAS - LAT PEAK E' VEL: 10.4 CM/SEC
BH CV ECHO MEAS - LV DIASTOLIC VOL/BSA (35-75): 30.1 CM2
BH CV ECHO MEAS - LV MASS(C)D: 127.2 GRAMS
BH CV ECHO MEAS - LV SYSTOLIC VOL/BSA (12-30): 14.5 CM2
BH CV ECHO MEAS - LVIDD: 4.7 CM
BH CV ECHO MEAS - LVIDS: 2.9 CM
BH CV ECHO MEAS - LVPWD: 0.88 CM
BH CV ECHO MEAS - MED PEAK E' VEL: 6.2 CM/SEC
BH CV ECHO MEAS - MV A DUR: 0.1 SEC
BH CV ECHO MEAS - MV A MAX VEL: 80.1 CM/SEC
BH CV ECHO MEAS - MV DEC SLOPE: 406.9 CM/SEC2
BH CV ECHO MEAS - MV DEC TIME: 0.18 MSEC
BH CV ECHO MEAS - MV E MAX VEL: 70.3 CM/SEC
BH CV ECHO MEAS - MV E/A: 0.88
BH CV ECHO MEAS - MV MAX PG: 3.7 MMHG
BH CV ECHO MEAS - MV MEAN PG: 2.32 MMHG
BH CV ECHO MEAS - MV P1/2T: 63.5 MSEC
BH CV ECHO MEAS - MV V2 VTI: 23.4 CM
BH CV ECHO MEAS - MVA(P1/2T): 3.5 CM2
BH CV ECHO MEAS - PULM A REVS DUR: 0.12 SEC
BH CV ECHO MEAS - PULM A REVS VEL: 21 CM/SEC
BH CV ECHO MEAS - PULM DIAS VEL: 44.1 CM/SEC
BH CV ECHO MEAS - PULM S/D: 1.04
BH CV ECHO MEAS - PULM SYS VEL: 45.8 CM/SEC
BH CV ECHO MEAS - RAP SYSTOLE: 3 MMHG
BH CV ECHO MEAS - SI(MOD-SP2): 18 ML/M2
BH CV ECHO MEAS - SI(MOD-SP4): 15.5 ML/M2
BH CV ECHO MEAS - SV(MOD-SP2): 36 ML
BH CV ECHO MEAS - SV(MOD-SP4): 31 ML
BH CV ECHO MEASUREMENTS AVERAGE E/E' RATIO: 8.47
BH CV XLRA - TDI S': 11.2 CM/SEC
LEFT ATRIUM VOLUME INDEX: 9 ML/M2
MAXIMAL PREDICTED HEART RATE: 177 BPM
STRESS TARGET HR: 150 BPM

## 2023-01-12 PROCEDURE — 93321 DOPPLER ECHO F-UP/LMTD STD: CPT

## 2023-01-12 PROCEDURE — 93308 TTE F-UP OR LMTD: CPT | Performed by: INTERNAL MEDICINE

## 2023-01-12 PROCEDURE — 25010000002 PERFLUTREN (DEFINITY) 8.476 MG IN SODIUM CHLORIDE (PF) 0.9 % 10 ML INJECTION: Performed by: INTERNAL MEDICINE

## 2023-01-12 PROCEDURE — 93356 MYOCRD STRAIN IMG SPCKL TRCK: CPT

## 2023-01-12 PROCEDURE — 93325 DOPPLER ECHO COLOR FLOW MAPG: CPT

## 2023-01-12 PROCEDURE — 93325 DOPPLER ECHO COLOR FLOW MAPG: CPT | Performed by: INTERNAL MEDICINE

## 2023-01-12 PROCEDURE — 93308 TTE F-UP OR LMTD: CPT

## 2023-01-12 PROCEDURE — 93356 MYOCRD STRAIN IMG SPCKL TRCK: CPT | Performed by: INTERNAL MEDICINE

## 2023-01-12 PROCEDURE — 93321 DOPPLER ECHO F-UP/LMTD STD: CPT | Performed by: INTERNAL MEDICINE

## 2023-01-12 RX ADMIN — PERFLUTREN 1.5 ML: 6.52 INJECTION, SUSPENSION INTRAVENOUS at 14:29

## 2023-01-12 NOTE — TELEPHONE ENCOUNTER
Notified patient of results, patient verbalizes understanding.    Alma Edmond RN  [facility] Triage  01/12/23 15:26 EST

## 2023-01-16 ENCOUNTER — APPOINTMENT (OUTPATIENT)
Dept: WOMENS IMAGING | Facility: HOSPITAL | Age: 43
End: 2023-01-16
Payer: COMMERCIAL

## 2023-01-16 PROCEDURE — 77067 SCR MAMMO BI INCL CAD: CPT | Performed by: RADIOLOGY

## 2023-01-16 PROCEDURE — 77063 BREAST TOMOSYNTHESIS BI: CPT | Performed by: RADIOLOGY

## 2023-01-16 NOTE — PROGRESS NOTES
"        Chief Complaint  Depression     Subjective:      History of Present Illness {CC  Problem List  Visit  Diagnosis   Encounters  Notes  Medications  Labs  Result Review Imaging  Media :23}     Carole Weaver presents to [facility] for:      Adenocarcinoma of rectum: 2019  See prior notes for hx.    Since last visit: she had ileostomy take down with Dr Ye on 11/3/2022  She had some post-op issues with frequent BM - generally in the evening.  Increased fiber and was started on lomotil and nortriptyline.   Today: states stools are improving: continues lomotil and imodium     Depression: she stopped lexapro at the end of October (due to decreased libido). Since then, she notices a significant change. Short tempered and it is affecting her at work.   She has seen GYN who suggested vaginal premarin for vulvovaginal atrophy. She will d/w Dr Nguyen today.       Factor V Leiden:   Continues Lovenox     GYN also suggested checking vitamin D and dexa scan due to chemo treatments.     I have reviewed patient's medical history, any new submitted information provided by patient or medical assistant and updated medical record.      Objective:      Physical Exam  Constitutional:       Appearance: Normal appearance.   Cardiovascular:      Rate and Rhythm: Normal rate.   Pulmonary:      Effort: Pulmonary effort is normal.      Breath sounds: Normal breath sounds.   Abdominal:      General: Bowel sounds are normal.   Neurological:      Mental Status: She is alert and oriented to person, place, and time.        Result Review  Data Reviewed:{ Labs  Result Review  Imaging  Med Tab  Media :23}                Vital Signs:   /62 (BP Location: Left arm, Patient Position: Sitting, Cuff Size: Adult)   Pulse 102   Temp 96.4 °F (35.8 °C) (Temporal)   Ht 167.6 cm (66\")   Wt 92.8 kg (204 lb 9.6 oz)   SpO2 98%   BMI 33.02 kg/m²         Requested Prescriptions     Signed Prescriptions " Disp Refills   • escitalopram (Lexapro) 10 MG tablet 90 tablet 1     Sig: Take 1 tablet by mouth Daily.       Routine medications provided by this office will also be refilled via pharmacy request.       Current Outpatient Medications:   •  clonazePAM (KlonoPIN) 1 MG tablet, Take 1 tablet by mouth 3 (Three) Times a Day As Needed for Anxiety., Disp: 90 tablet, Rfl: 0  •  Cyanocobalamin (VITAMIN B-12 PO), Take 1 tablet by mouth Daily., Disp: , Rfl:   •  dicyclomine (BENTYL) 20 MG tablet, TAKE 1 TABLET BY MOUTH EVERY 6 HOURS AS NEEDED (Patient taking differently: Take 20 mg by mouth Every 6 (Six) Hours As Needed.), Disp: 360 tablet, Rfl: 0  •  diphenoxylate-atropine (LOMOTIL) 2.5-0.025 MG per tablet, , Disp: , Rfl:   •  Enoxaparin Sodium (LOVENOX) 100 MG/ML solution prefilled syringe syringe, Inject 1 mL under the skin into the appropriate area as directed Every 12 (Twelve) Hours., Disp: 60 mL, Rfl: 2  •  famotidine (PEPCID) 20 MG tablet, TAKE 1 TABLET BY MOUTH TWICE A DAY (Patient taking differently: Take 20 mg by mouth Every Night.), Disp: 180 tablet, Rfl: 1  •  Loratadine 10 MG capsule, Take 10 mg by mouth Every Evening., Disp: , Rfl:   •  metoprolol succinate XL (TOPROL-XL) 25 MG 24 hr tablet, TAKE 0.5 TABLETS BY MOUTH EVERY NIGHT, Disp: 45 tablet, Rfl: 3  •  ondansetron (ZOFRAN) 8 MG tablet, TAKE 1 TABLET BY MOUTH THREE TIMES A DAY AS NEEDED FOR NAUSEA AND VOMITING, Disp: 60 tablet, Rfl: 1  •  promethazine (PHENERGAN) 25 MG tablet, Take 1 tablet by mouth Every 6 (Six) Hours As Needed for Nausea or Vomiting., Disp: 60 tablet, Rfl: 2  •  escitalopram (Lexapro) 10 MG tablet, Take 1 tablet by mouth Daily., Disp: 90 tablet, Rfl: 1     Assessment and Plan:      Assessment and Plan {CC Problem List  Visit Diagnosis  ROS  Review (Popup)  Health Maintenance  Quality  BestPractice  Medications  SmartSets  SnapShot Encounters  Media :23}     Problem List Items Addressed This Visit        Gastrointestinal  Abdominal     Ileostomy present (HCC) (Chronic)    Overview     Take down on 11/3/2022            Mental Health    Anxiety (Chronic)    Current Assessment & Plan     Klonopin as needed: she takes sparingly   Last refill 12/29/22          Monopolar depression (HCC) (Chronic)    Overview     lexapro 20 mg: decreased libido   Prior treatment:  Wellbutrin (believes it had effect on libido)           Current Assessment & Plan     Patient's depression is worsening off lexapro.   Feels she is short tempered.     Discussed options and prior treatment: as lexapro was effective for depression, willing to restart at lower dose.  Will start at 10 mg daily. She will let me know if worsens or doesn't improve.   Open to behavioral health virtual visits.      Generally would add bupropion in addition for decreased libido but she doesn't think it helped in the past.   Other options to consider: remeron, viibryd          Relevant Medications    escitalopram (Lexapro) 10 MG tablet   Other Visit Diagnoses     Vitamin B deficiency    -  Primary    Relevant Orders    Vitamin B12 (Completed)    Vitamin D deficiency/bone loss risk 2/2 chemo-therapy        Relevant Orders    DEXA Bone Density Axial    Vitamin D 25 hydroxy (Completed)    Vitamin B12 (Completed)        Controlled contract updated today.     Follow Up {Instructions Charge Capture  Follow-up Communications :23}     Return in about 3 months (around 4/17/2023).      Patient was given instructions and counseling regarding her condition or for health maintenance advice. Please see specific information pulled into the AVS if appropriate.    Lois disclaimer:   Much of this encounter note is an electronic transcription/translation of spoken language to printed text. The electronic translation of spoken language may permit erroneous, or at times, nonsensical words or phrases to be inadvertently transcribed; Although I have reviewed the note for such errors, some may still exist.      Additional Patient Counseling:       There are no Patient Instructions on file for this visit.

## 2023-01-17 ENCOUNTER — APPOINTMENT (OUTPATIENT)
Dept: LAB | Facility: HOSPITAL | Age: 43
End: 2023-01-17
Payer: COMMERCIAL

## 2023-01-17 ENCOUNTER — APPOINTMENT (OUTPATIENT)
Dept: ONCOLOGY | Facility: HOSPITAL | Age: 43
End: 2023-01-17
Payer: COMMERCIAL

## 2023-01-17 ENCOUNTER — OFFICE VISIT (OUTPATIENT)
Dept: ONCOLOGY | Facility: CLINIC | Age: 43
End: 2023-01-17
Payer: COMMERCIAL

## 2023-01-17 ENCOUNTER — OFFICE VISIT (OUTPATIENT)
Dept: INTERNAL MEDICINE | Facility: CLINIC | Age: 43
End: 2023-01-17
Payer: COMMERCIAL

## 2023-01-17 VITALS
RESPIRATION RATE: 16 BRPM | WEIGHT: 202.3 LBS | OXYGEN SATURATION: 99 % | TEMPERATURE: 97.3 F | SYSTOLIC BLOOD PRESSURE: 117 MMHG | HEIGHT: 66 IN | DIASTOLIC BLOOD PRESSURE: 76 MMHG | BODY MASS INDEX: 32.51 KG/M2 | HEART RATE: 100 BPM

## 2023-01-17 VITALS
TEMPERATURE: 96.4 F | BODY MASS INDEX: 32.88 KG/M2 | HEIGHT: 66 IN | WEIGHT: 204.6 LBS | HEART RATE: 102 BPM | OXYGEN SATURATION: 98 % | SYSTOLIC BLOOD PRESSURE: 112 MMHG | DIASTOLIC BLOOD PRESSURE: 62 MMHG

## 2023-01-17 DIAGNOSIS — F33.9 MONOPOLAR DEPRESSION: ICD-10-CM

## 2023-01-17 DIAGNOSIS — C78.01 MALIGNANT NEOPLASM METASTATIC TO BOTH LUNGS: ICD-10-CM

## 2023-01-17 DIAGNOSIS — C20 ADENOCARCINOMA OF RECTUM: Primary | Chronic | ICD-10-CM

## 2023-01-17 DIAGNOSIS — Z93.2 ILEOSTOMY PRESENT: Chronic | ICD-10-CM

## 2023-01-17 DIAGNOSIS — E53.9 VITAMIN B DEFICIENCY: Primary | ICD-10-CM

## 2023-01-17 DIAGNOSIS — C78.02 MALIGNANT NEOPLASM METASTATIC TO BOTH LUNGS: ICD-10-CM

## 2023-01-17 DIAGNOSIS — F41.9 ANXIETY: Chronic | ICD-10-CM

## 2023-01-17 DIAGNOSIS — E55.9 VITAMIN D DEFICIENCY: ICD-10-CM

## 2023-01-17 PROBLEM — Z23 IMMUNIZATION DUE: Status: RESOLVED | Noted: 2018-11-08 | Resolved: 2023-01-17

## 2023-01-17 PROBLEM — K92.1 HEMATOCHEZIA: Status: RESOLVED | Noted: 2019-12-10 | Resolved: 2023-01-17

## 2023-01-17 LAB
25(OH)D3+25(OH)D2 SERPL-MCNC: 10.3 NG/ML (ref 30–100)
VIT B12 SERPL-MCNC: 1979 PG/ML (ref 211–946)

## 2023-01-17 PROCEDURE — 99214 OFFICE O/P EST MOD 30 MIN: CPT | Performed by: NURSE PRACTITIONER

## 2023-01-17 PROCEDURE — 99214 OFFICE O/P EST MOD 30 MIN: CPT | Performed by: INTERNAL MEDICINE

## 2023-01-17 RX ORDER — ESCITALOPRAM OXALATE 10 MG/1
10 TABLET ORAL DAILY
Qty: 90 TABLET | Refills: 1 | Status: SHIPPED | OUTPATIENT
Start: 2023-01-17

## 2023-01-17 NOTE — PROGRESS NOTES
REASON FOR FOLLOW UP:     1. Rectal cancer, rectal ultrasound examination in July 2019 reported T3N0 disease without lymphadenopathy. She does have small pulmonary nodule 6-7 mm and 2 mm with indeterminate feature. There is no solid evidence of metastatic disease otherwise. Patient has stage IIA (T3N0M0) disease.  2. The patient is heterozygous factor V Leiden, was on prophylactic anticoagulation with Lovenox 40 mg daily given her increased risk of thrombosis with MediPort and GI malignancy.   3. PET scan on 8/8/2019 reported an 8 mm hypermetabolic right upper lobe pulmonary nodule, which is suspicious for metastatic as well.    4. Patient had a PowerPort placement on the left upper chest by Dr. Joseph on 8/9/2019.  5. Patient was started on concurrent infusional 5-FU chemoradiation therapy on 8/12/2019, with planned complete surgical resection and further adjuvant chemotherapy with intention to cure the disease.   6. Patient underwent surgical resection of the primary rectal cancer by Dr. Ye on 12/2/2019.  Pathology evaluation reported residual T3N1 disease stage IIIb.  7. Diarrhea related to therapy and radiation.   8. Patient was started cycle 1 day 1 of adjuvant FOLFOX 6 on 1/23/2020.  9. On 2/5/2020 FOLFOX 6 cycle 2 delayed secondary to neutropenia.  Patient was given 3 days of Granix injection.  After cycle #2 we planned 3 days of Granix with each cycle.   10. Patient also intolerant of oral iron.  Patient received 2 doses of IV injectafer on 02/05/2020 and 02/12/2020.   11. 02/12/2020 Proceed with cycle #2 of FOLFOX 6 with 3 days of Granix.  12. On 3/11/2020 cycle 4 postponed secondary to abdominal pain and occasional low-grade fevers.  CT scans ordered.  13. Cycle #4 resumed after CT scan revealed no evidence of disease.  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 Ye. patient has no fever.  Continue to observe.   14. Grade 3  hand-foot syndrome secondary to 5-FU after cycle #7 FOLFOX 6 chemotherapy, prompting ER visit.  Also has worsening peripheral neuropathy.   15. Cycle #8 FOLFOX 6 was given on 5/13/2020, with 50% dose reduction for both 5-FU and oxaliplatin, and also examination of bolus 5-FU.   16. PET scan examination on 5/21/2020 reported no evidence of metastatic disease in the chest abdomen pelvis.  Stable 6 mm RUL pulmonary nodule.  17. On 5/27/2020, I discussed with the patient that we can discontinue chemotherapy at this time.   18. Patient had a surgical procedure for low anterior colon resection, coloanal anastomosis on 8/3/2020.  19. CT scan for chest abdomen pelvis on 9/9/2020 reported a new 8 mm noncalcified pulmonary nodule in the left lower lobe of the lung.  Otherwise stable right upper lobe 6 mm pulmonary nodule, and no evidence of disease recurrence in the abdomen or pelvis.  20. PET scan examination on 9/18/2020 reported multiple hypermetabolic small pulmonary nodules/ new pulmonary nodules.   21. Patient was started on pelvic chemotherapy with FOLFIRI regimen on 10/13/2020.   22. 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 It reported tumor mutation burden 5 Muts/Mb, microsatellite stable, TP53 mutation R282W, and others.   23. Cycle #5 was without irinotecan, due to peripheral neuropathy.  24. 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  Patient was switched to weight-based Lovenox injection.  25. Cycle #6 5-FU and irinotecan was delayed by 2 weeks because of the above incident.  26. Patient had a telemedicine evaluation at that the [facility] [place] in February 2021.  They agreed with our treatment plan for palliative chemotherapy followed by maintenance chemotherapy.    27. PET scan examination on 4/6/2021 after cycle #12 palliative chemotherapy reported no  evidence of hypermetabolic metastatic lesion.   28. Patient was started on maintenance chemotherapy with 5-FU and Avastin on 4/13/2021. (Unable to tolerate Xeloda because of a significant hand-foot syndrome).   29. PET scan on 9/24/2021 obtained after cycle #12 maintenance chemotherapy with 5-FU, leucovorin, Avastin reported no evidence for active disease. We recommended 3 months chemotherapy holiday.  30. CT scan examination on 3/15/2022 reported slightly disease progression with increase in size of small pulmonary nodule.  We started patient back on maintenance chemotherapy on 3/29/2022 treatment with 5-FU plus leucovorin and Avastin, repeating every 2 weeks.  31. After 6 more maintenance chemotherapy, CT scan for chest abdomen pelvis was done on 6/21/2022 which reported stable sub-6 mm pulmonary nodules.  Patient had last maintenance chemotherapy on 6/7/2022.       HISTORY OF PRESENT ILLNESS:  The patient is a 42 y.o. female with the above-mentioned issue who is here today for 4-month evaluation to discuss results of CT scan examination obtained on 1/10/2023 and to discuss further management plan.    Patient was seen by Dr. Ye, who performed ileostomy takedown on 11/14/2022.  Patient reports that she is recovering.  She still has a small open wound less than 1 cm in diameter, however close to 2 cm deep.  She has been changing dressing herself.  She denies fever sweating chills.    Patient has no other specific complaints.  She has excellent performance status ECOG 0.  She denies chest pain dyspnea cough hemoptysis.  No abdominal pain.  No melena hematochezia.  Patient has been eating well, stable weight.  She works full-time.    She continues Lovenox injections and denies any significant bleeding issues.   No other new problems or concerns.     CT scan for chest abdomen pelvis obtained on 1/10/2023 reported stable small pulmonary nodules, no evidence for active or new disease.    Laboratory study on 1/10/2023  reported normal CBC and normal CMP.  CEA level is 0.99 ng/mL.      Past Medical History:   Diagnosis Date   • Abdominopelvic abscess (HCC) 08/12/2020    ADMITTED TO [facility]   • Abnormal Pap smear of cervix 02/02/1998    JULIUS I   • Abscess of abdominal wall 11/28/2012    SEEN AT [facility] ER   • Anemia in pregnancy    • Anxiety    • Bilateral epiphora 04/2020   • Bilateral hand swelling 05/02/2020    SEEN AT [facility] ER   • Cervical lymphadenitis 06/06/2012    SEEN AT [facility] ER   • Chemotherapy induced diarrhea 01/2021   • Chemotherapy induced neutropenia (HCC) 04/2020   • Chemotherapy-induced nausea 02/2020   • Chemotherapy-induced thrombocytopenia 05/2020   • Chronic anticoagulation    • Chronic diarrhea    • Colon polyps     FOLLOWED BY DR. 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 ALEXANDRU HUNT   • History of gestational diabetes    • History of pre-eclampsia 1998   • History of radiation therapy 2019    FOLLOWED BY DR. JAVON LEWIS   • History of TB skin testing 01/17/2009    TB Skin Test   • History of transfusion 2019    12/2019   • HPV in female 1998   • Hypokalemia 09/2019   • Hypomagnesemia 09/2019   • Hyponatremia 06/2021   • IBS (irritable bowel syndrome)    • Infected insect bite of neck 05/27/2016    SEEN AT [facility]   • Kidney stones 08/09/2007    SEEN AT [facility] ER   • Low anterior resection syndrome 12/2022     FOLLOWED BY DR. PADMAJA ESPARZA   • Lump of right breast     SWOLLEN LYMPH NODE   • Lung cancer (HCC) 2020    METASTATIC LUNG CANCER   • Macrocytosis 2021   • Monopolar depression (HCC)    • On anticoagulant therapy    • Pain associated with surgical procedure 2020   • Palmar-plantar erythrodysesthesia 2021   • Perirectal abscess 2020   • Pulmonary embolism without acute cor pulmonale (HCC) 09/20/2019    X 3; ADMITTED TO [facility]   • Pulmonary nodule, right 2020   • Rectal cancer (HCC) 2019    STAGE IIA, INVASIVE MODERATELY DIFFERENTIATED ADENOCARCINOMA, CHEMO AND XRT FINISHED 2019   • Right shoulder pain 2020    SEEN AT [facility] ER   • Right ureteral stone 10/01/2019    SEEN AT [facility] ER   • SAB (spontaneous ) 1996     A2-1 INDUCED   • Sciatica    • Sepsis due to cellulitis (HCC) 2002    LEFT GREAT TOE, ADMITTED TO [facility]   • Tachycardia 2020   • 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  She recovered without problem.      Past Surgical History:   Procedure Laterality Date   • ABDOMINAL SURGERY     • CHOLECYSTECTOMY N/A 10/10/2003    LAPAROSCOPIC WITH CHOLANGIOGRAM, DR. JAMEY TALAVERA AT [facility]   • COLON RESECTION N/A 2019    Procedure: laparoscopic low anterior colon resection with mobilization of splenic flexure and diverting loop ileostomy: ERAS;  Surgeon: Padmaja Esparza MD;  Location: [facility] MAIN OR;  Service: General   • COLON RESECTION N/A 2020    Procedure: LOW ANTERIOR COLON RESECTION, COLOANAL ANASTOMOSIS, MOBILIZATION SPLENIC FLEXURE;  Surgeon: Padmaja Esparza MD;  Location: [facility] MAIN OR;  Service: General;  Laterality: N/A;   • COLONOSCOPY N/A 07/15/2020    PATENT ANASTAMOSIS IN MID RECTUM, RESCOPE IN 1 YR, DR. PADMAJA ESPARZA AT [facility]   • COLONOSCOPY N/A  11/03/2022    DIFFUSE AREA OF MODERATELY FRIABLE MUCOSA IN ENTIRE COLON , DIVERSION COLITIS, PATENT AND HEALTHY ANASTAMOSIS, DR. GERONIMO YE AT [facility]   • COLONOSCOPY W/ POLYPECTOMY N/A 07/08/2019    15 MM TUBULOVILLOUS ADENOMA POLYP IN HEPATIC FLEXURE, 20 MMTUBULOVILLOUS ADENOMA WITH HIGH GRADE DYSPLASIA IN RECTOSIGMOID, 4 CM MASS IN MID RECTUM, PATH: INVASIVE MODERATELY DIFFERENTIATED ADENOCARCINOMA, DR. JENNIFER LI AT [facility]   • DILATATION AND EVACUATION N/A 2009   • ENDOSCOPY N/A 07/08/2019    LA GRADE A ESOPHAGITIS, GASTRITIS, ALL BIOPSIES BENIGN, DR. JENNIFER LI AT [facility]   • ILEOSTOMY CLOSURE N/A 11/14/2022    Procedure: ILEOSTOMY TAKEDOWN;  Surgeon: Geronimo Ye MD;  Location: [facility] OR;  Service: General;  Laterality: N/A;   • INCISION AND DRAINAGE PERIRECTAL ABSCESS N/A 08/14/2020    Procedure: INCISION AND DRAINAGE OF retrorectal dehiscence abcess with drain placement and irrigation;  Surgeon: Geronimo Ye MD;  Location: [facility] MAIN OR;  Service: General;  Laterality: N/A;   • PAP SMEAR N/A 01/24/2014   • SIGMOIDOSCOPY N/A 07/24/2019    INFILTRATIVE PARTIALLY OBSTRUCTING LARGE RECTAL CANCER, AREA TATOOED, DR. GERONIMO YE AT [facility]   • SIGMOIDOSCOPY N/A 11/23/2019    AN ULCERATED PARTIALLY OBSTRUCTING MASS IN MID RECTUM, AREA TATTOOED, DR. GERONIMO YE AT [facility]   • SIGMOIDOSCOPY N/A 07/22/2021    PATENT ANASTAMOSIS IN DISTAL RECTUM, RESCOPE IN 1 YR, DR. GERONIMO YE AT [facility]   • TRANSRECTAL ULTRASOUND N/A 07/24/2019    Procedure: ULTRASOUND TRANSRECTAL;  Surgeon: Geronimo Ye MD;  Location: [facility] ENDOSCOPY;  Service: General   • URETEROSCOPY LASER LITHOTRIPSY WITH STENT INSERTION Right 10/30/2019    DR. ESTUARDO BEASLEY AT [place]   • VAGINAL DELIVERY N/A 09/18/1998   • VENOUS ACCESS DEVICE (PORT) INSERTION Left 08/09/2019    Procedure: INSERTION VENOUS ACCESS DEVICE;  Surgeon: Sj Joseph MD;  Location: [facility] OR OSC;  Service: General   • VENOUS ACCESS  DEVICE (PORT) INSERTION N/A 12/18/2020    Procedure: INSERTION VENOUS ACCESS DEVICE right side, removal venous access device left side;  Surgeon: Sj Joseph MD;  [facility];  Service: General;  Laterality: N/A;   • WISDOM TOOTH EXTRACTION Bilateral 1993       HEMATOLOGIC/ONCOLOGIC HISTORY:      The patient reports she has intermittent rectal bleeding and urgency, mucous with her stool, starting sometime in 2016. At that time she was referred to GI service, and was diagnosed of irritable bowel syndrome. Nevertheless she had worsening urgency for bowel movement, and had a couple of incidents losing control of stool. She was recently seen by Roland Thorpe MD again and had colonoscopy and EGD exam on 07/08/2019. She was found having a circumferential rectal mass. According to the procedure note, the patient had a fungating circumferential bleeding 4 cm mass in the middle rectum, at distance between 13 cm and 17 cm from the anus. Mass was causing partial obstruction. There were also colon polyps found at the hepatic flexure and also at the rectosigmoid junction 23 cm from the anus. Both were resected and retrieved. EGD examination reported grade A esophagitis at the GE junction and patchy discontinuous erythema and aggravation of the mucosa without bleeding in the stomach body. There is normal mucosa of the duodenum. Pathology evaluation from this procedure reported moderately differentiated adenocarcinoma involving the rectal mass. The rectal sigmoid junction polyp was tubular/tubulovillous adenoma with high grade dysplasia negative for invasive malignancy. The hepatic flexure polyp was also tubular/tubulovillous adenoma negative for high grade dysplasia or malignancy. The biopsy from the esophagus reports squamocolumnar mucosa with inflammatory changes suggestive of mild reflux esophagitis, negative for interstitial metaplasia. Gastric biopsy was benign and duodenal biopsy was also benign. There is  no mention of H-pylori.     Patient was subsequently referred to colorectal surgeon Padmaja Ye MD for further evaluation. The patient had CT scan examination for chest, abdomen and pelvis requested by Dr. Ye and were done on 07/13/2019. The study reported no evidence of lymphadenopathy in the abdomen and pelvis. There was wall thickening of the rectosigmoid junction. Normal spleen, pancreas, adrenal glands and kidneys. There was fatty liver infiltration but no focal lymphatic lesions. There was a small 6-7 mm noncalcified nodule in the right upper lobe and a tiny 3 mm subpleural nodule in the right middle lobe. No mediastinal or hilar lymphadenopathy.     Dr. Ye performed staging rectal ultrasound examination. This study reported tumor penetrating through the muscularis propria as T3 disease; there were no lymph nodes identified.    She had a hospitalization in late September 2019 because of newly discovered pulmonary emboli.  She was on prophylactic Lovenox prior to the incident of PE.  Now she is on full dose Xarelto anticoagulation.  Patient reports no further chest pain dyspnea.  She denies bleeding or bruising.  During her hospitalization, she was seen by Dr. Ye, who plans to have surgery 8 to 12 weeks after finishing radiation therapy.  She finished her radiation on 9/19/2019.     I noticed patient also presented to the emergency room on 10/1/2019 complaining of right flank area pain.  I reviewed the images studies and indeed she has a very small 1 to 2 mm obstructing kidney stone in the UV junction.  Patient is still symptomatic with some pains and dysuria.  She denies fever sweating or chills.    Laboratory study on 10/7/2019 reported normal CBC including hemoglobin 13.1, platelets 301,000, WBC 6170 and ANC 4900 lymphocytes 590 monocytes 480.      The nurse reported malfunction of port-a-catheter on 10/7/2019.  Port study on 10/8/2019 showed fibrin sheath around the distal aspect of the Mediport  catheter in the SVC. This does not appear to hinder injection, but does prevent aspiration at this time.    Patient underwent surgical resection of the colon on 12/2/2019 with Dr. Ye.  Pathology evaluation reported residual T3 disease, also 1 out of 14 lymph nodes positive for malignancy.  So this patient in either had at least stage IIIb disease (T3 N1 M0?).      Adjuvant chemotherapy FOLFOX 6 will be started on 1/22/2020.  Laboratory study reported iron saturation 10%, free iron 31 TIBC 319 and ferritin 168.  Her hemoglobin was 11.8, WBC 5600, and platelets 347,000.    Patient was here on 02/12/2020 for cycle 2 of her FOLFOX.  This is delayed x1 week secondary to neutropenia.  The patient ultimately received 3 days worth of Neupogen with recovery of her blood counts.  Of note, the patient struggled with significant nausea without any episodes of vomiting following cycle #1 of chemotherapy resulting in significant weight loss.  She is up 12 pounds over the last week as her appetite has normalized.  We will add Emend to her care plan.    She is having several loose stools per her colostomy and has been hesitant to take Imodium due to prior history of constipation.  I encouraged her to try this with a maximum of 8 tablets/day.  She denies any infectious symptoms including fevers or chills.  No excess bleeding or bruising noted.  She had the expected cold sensitivity related to the Oxaliplatin for about 3 days following treatment.    Labs from 02/12/2020 demonstrated total white blood cell count of 5.14, ANC of 3.06, hemoglobin of 11.2, platelets of 211,000.  She was found to be iron deficient last week and is intolerant to oral iron secondary to GI distress.  For this reason, she initiated IV iron therapy with Injectafer last week.  She had received her second dose 02/12/2020    Patient has normalized hemoglobin 12.2 on 2/26/2020.    She reported on 5/5/2020 she had a recent visit to the emergency department for  what was suspected to be an allergic reaction.  She called our on-call service on Saturday with reports of hand and face swelling.  She was instructed to proceed to the emergency department at which time she was assessed and prescribed a Medrol Dosepak.  She had just completed her 5-FU and was unhooked on Friday, 5/3/2020.  Her symptoms have improved.  She does report persistent hyperpigmentation and mild swelling of the palms of the hands but this is much improved.  It was her right hand specifically that was swollen.  Her facial swelling has resolved.  She continues on Cefdinir nd since has 1 day remaining, she was prescribed cefdinir for a UTI requiring hospitalization at the end of April.  Reports no new symptoms.  Her labs are stable.  She is scheduled for treatment again.    Patient states at this time she is not tolerating her chemotherapy well.     Patient seen in the emergency department on 5/2/2020 for what was suspected to be an allergic reaction.  She called our on-call service on Saturday explaining that she was experiencing hand and face swelling.  She was instructed to proceed to the emergency department at which time she was assessed and prescribed a Medrol Dosepak.  She had just completed her 5-FU and was unhooked on Friday, 5/1/2020.      She reports since her ED visit on 5/2/2020 her symptoms have not improved. Her hands and feet were swollen upon presenting to the ED and she could barely make a fist. She states that she feels so swollen she is not able to stand it. She states that her skin on the hands and feet are peeling extensively as well, besides changing color to more dark.     She also reports significant fatigue after her first week of the 5-FU treatment but she expected this side effect. She also notices significant increase in her neuropathy to the point that she is not able to even walk around in her home without her house slippers due to irritation from her rugs. She denies and  associated nausea or vomiting at this time. She also has episodes of epistaxis every day after the chemotherapy cycle #7.  She does report working full-time during the week of chemotherapy.    Laboratory studies, 5/13/2020, show moderate thrombocytopenia platelets 123,000, low normal WBC 4140 including ANC 2720 and normal hemoglobin 13.4.  Because significant hand-foot syndrome, will decrease both 5-FU and oxaliplatin by 50%, and eliminate bolus dose of 5-FU.    On 5/21/2020 patient had a PET scan performed which showed no convincing evidence of residual disease in abdomen or pelvis, no metastatic disease within the chest or neck.     Cycle #8 FOLFOX 6 was given on 5/13/2020, with 50% dose reduction for both 5-FU and oxaliplatin, and also examination of bolus 5-FU.  She states on 5/27/2020 that with the recent reduction of the chemotherapy she feels significantly better. She has more energy and is able to do her daily routine.      PET scan examination on 5/21/2020 reported no evidence of metastatic disease in chest abdomen pelvis.  There was a stable 6 mm right upper lobe pulmonary nodule.    Laboratory studies on 5/27/2020 showed mild leukocytopenia WBC 3720 but a normal ANC 2250 and lymphocytes 630.  Normal platelets 163,000 and hemoglobin 12.6.  Chemistry lab reported normal renal function, liver function panel and a electrolytes, elevated glucose 164.    Laboratory studies 6/24/2020, showed normal hemoglobin 13.4 but macrocytosis .9.  Normal platelets 210,000 and WBC 5870.  She had normal CMP.     Patient last time was here in late June 2020.  Since that time she had surgical procedure for low anterior colon resection, coloanal anastomosis on 8/3/2020.  She later developed a perirectal abscess, required surgical drainage on 8/14/2020.  She was discharged on 8/27/2020.    Patient reports to me she still has lower abdominal wall vacuum suction in place.  She denies fever sweating chills.  Performance  status is ECOG 1.  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  She does have visiting nurse come to the home for wound care.    CT scan for chest abdomen pelvis on 9/9/2020 reported a new 8 mm noncalcified pulmonary nodule in the left lower lobe.  Otherwise stable right upper lobe 6 mm pulmonary nodule, and no evidence of disease recurrence in the abdomen or pelvis.     Laboratory study on 9/16/2020 reported elevated AST 55, ALT 95, alk phosphatase 143 but normal total bilirubin 0.3.  Chemistry lab otherwise unremarkable.  She has completed normal CBC.      Because of the abnormal CT scan examination for chest abdomen pelvis on 9/9/2020 reported a new 8 mm noncalcified pulmonary nodule in the left lower lobe, we obtained a PET scan examination for further evaluation, which was done on 9/18/2020.  This study reported several pulmonary nodules, some of them are hypermetabolic, newly developed compared to previous PET scan in May 2020.  Certainly does highly suspicious for metastatic disease.  There are also hypermetabolic activity in the abdomen and pelvic area which is hard to differentiate from surgical scar versus metastatic malignancy.    Laboratory study on 10/13/2020 reported normal CBC with Hb 13.9, platelets 302,000 and WBC 5520 including ANC 3830.  Chemistry lab reported normalized AST 20, alk phosphatase 116 improved ALT 35, and maintains normal bilirubin, with normal electrolytes and liver function panel.     Patient was started on first cycle of palliative chemotherapy FOLFIRI on 10/13/2020.    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  Patient had a new port placed 12/18/2020, and had held her Xarelto for 2 days prior to surgery.  She presented to the ER on 12/21/20 with complaints of facial and arm swelling for 3  days.  She also noted increased shortness of breath.  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  Thrombus within the right internal jugular vein cannot be excluded.  Patient was started on IV heparin, and eventually transitioned to weight-based Lovenox, which she now continues.    PET scan examination on 9/24/2021 reported further shrinking of the tiny right upper lobe pulmonary nodule.  Otherwise no new lesions.  No evidence for metastatic disease in other areas.      Patient had CT scan for chest with IV contrast obtained on 12/14/2021 which reported small tiny stable pulmonary nodules. There is a 4 mm right upper lobe pulmonary nodule. There is also a stable 4 mm left lower lobe pulmonary nodule. There is also stable left upper lobe micronodule.     Laboratory studies today on 12/21/2021 reported normal hemoglobin 15.9 however .0, platelets 218,000 WBC 5360 including neutrophils 3730 lymphocytes 980. Chemistry lab reported normal renal function, electrolytes, glucose, and marginally elevated ALT 55, AST 34 but normal total bilirubin and alk phosphatase.     CT scan for chest abdomen pelvis 6/21/2022 reported sub-6 mm pulmonary nodules either stable or slightly smaller.  No new pulmonary nodules or evidence for disease progression.  There is no evidence for metastatic disease in the liver however it shows diffuse hepatic steatosis.  There was masslike thickening in the presacral space with the clips or calcification approximately 1.7 cm in greatest AP dimension but stable.    Laboratory study on 9/20/2022 reported normal hemoglobin 15.7 with mild macrocytosis .1.  She has normal CBC and platelets.  She also has unremarkable CMP.  Normal CEA 1.13 ng/mL.        MEDICATIONS    Current Outpatient Medications:   •  clonazePAM (KlonoPIN) 1 MG tablet, Take 1 tablet by mouth 3 (Three) Times a Day As Needed for Anxiety., Disp: 90 tablet, Rfl: 0  •  Cyanocobalamin (VITAMIN B-12  PO), Take 1 tablet by mouth Daily., Disp: , Rfl:   •  dicyclomine (BENTYL) 20 MG tablet, TAKE 1 TABLET BY MOUTH EVERY 6 HOURS AS NEEDED (Patient taking differently: Take 20 mg by mouth Every 6 (Six) Hours As Needed.), Disp: 360 tablet, Rfl: 0  •  diphenoxylate-atropine (LOMOTIL) 2.5-0.025 MG per tablet, , Disp: , Rfl:   •  Enoxaparin Sodium (LOVENOX) 100 MG/ML solution prefilled syringe syringe, Inject 1 mL under the skin into the appropriate area as directed Every 12 (Twelve) Hours., Disp: 60 mL, Rfl: 2  •  escitalopram (Lexapro) 10 MG tablet, Take 1 tablet by mouth Daily., Disp: 90 tablet, Rfl: 1  •  famotidine (PEPCID) 20 MG tablet, TAKE 1 TABLET BY MOUTH TWICE A DAY (Patient taking differently: Take 20 mg by mouth Every Night.), Disp: 180 tablet, Rfl: 1  •  Loratadine 10 MG capsule, Take 10 mg by mouth Every Evening., Disp: , Rfl:   •  metoprolol succinate XL (TOPROL-XL) 25 MG 24 hr tablet, TAKE 0.5 TABLETS BY MOUTH EVERY NIGHT, Disp: 45 tablet, Rfl: 3  •  ondansetron (ZOFRAN) 8 MG tablet, TAKE 1 TABLET BY MOUTH THREE TIMES A DAY AS NEEDED FOR NAUSEA AND VOMITING, Disp: 60 tablet, Rfl: 1  •  promethazine (PHENERGAN) 25 MG tablet, Take 1 tablet by mouth Every 6 (Six) Hours As Needed for Nausea or Vomiting., Disp: 60 tablet, Rfl: 2    ALLERGIES:   No Known Allergies    SOCIAL HISTORY:       Social History     Tobacco Use   • Smoking status: Every Day     Packs/day: 1.00     Years: 25.00     Pack years: 25.00     Types: Electronic Cigarette, Cigarettes     Passive exposure: Current   • Smokeless tobacco: Never   • Tobacco comments:     1 PPD   Vaping Use   • Vaping Use: Some days   • Substances: Nicotine   • Devices: Disposable   • Passive vaping exposure: Yes   Substance Use Topics   • Alcohol use: Not Currently     Comment: RARELY   • Drug use: Never         FAMILY HISTORY:   · Mother has positive factor V Leiden mutation, although she did not have thrombosis, mother also is coronary disease with stenting, she  "also is occasional bruising.    · Maternal grandmother had DVT, she had multiple surgical procedures.    · Patient mother's half-brother had metastatic colon cancer at diagnosis in his 50s.    · Maternal great aunt had breast cancer.           Vitals:    01/17/23 0952   BP: 117/76   Pulse: 100   Resp: 16   Temp: 97.3 °F (36.3 °C)   TempSrc: Temporal   SpO2: 99%   Weight: 91.8 kg (202 lb 4.8 oz)   Height: 167.6 cm (65.98\")   PainSc: 0-No pain     Current Status 1/17/2023   ECOG score 0     Physical Exam  Vitals reviewed.   Constitutional:       Appearance: Normal appearance. She is well-developed.   HENT:      Head: Normocephalic and atraumatic.      Comments: Patient wears mask due to the pandemic coronavirus infection.     Eyes:      Conjunctiva/sclera: Conjunctivae normal.   Cardiovascular:      Rate and Rhythm: Normal rate and regular rhythm.      Heart sounds: Normal heart sounds. No murmur heard.  Pulmonary:      Effort: Pulmonary effort is normal. No respiratory distress.      Breath sounds: Normal breath sounds.   Abdominal:      General: Bowel sounds are normal. There is no distension.      Palpations: Abdomen is soft.      Tenderness: There is no abdominal tenderness. There is no guarding.      Comments: Ostomy in right abdomen. Scattered bruises on the abdomen bilaterally from Lovenox injections.   Musculoskeletal:         General: No swelling.   Lymphadenopathy:      Cervical: No cervical adenopathy.   Skin:     General: Skin is warm and dry.      Comments: Abdominal wall wound, 1 cm in diameter it was packed with gauze   Neurological:      Mental Status: She is alert and oriented to person, place, and time. Mental status is at baseline.   Psychiatric:         Mood and Affect: Mood normal.         Thought Content: Thought content normal.         RECENT LABS:      Results from last 7 days   Lab Units 01/10/23  1152   WBC 10*3/mm3 6.21   NEUTROS ABS 10*3/mm3 3.58   HEMOGLOBIN g/dL 14.7   HEMATOCRIT % 45.4 "   PLATELETS 10*3/mm3 229     Results from last 7 days   Lab Units 01/10/23  1152   SODIUM mmol/L 140   POTASSIUM mmol/L 4.0   CHLORIDE mmol/L 106   CO2 mmol/L 24.5   BUN mg/dL 7   CREATININE mg/dL 0.66   CALCIUM mg/dL 9.1   ALBUMIN g/dL 3.5   BILIRUBIN mg/dL 0.3   ALK PHOS U/L 96   ALT (SGPT) U/L 26   AST (SGOT) U/L 18   GLUCOSE mg/dL 89              IMAGING:  Adult Transthoracic Echo Limited W/ Cont if Necessary Per Protocol  •  Left ventricular systolic function is normal. Calculated left   ventricular EF = 52.4%  •  Left ventricular diastolic function was normal.  •  Normal global longitudinal LV strain (GLS) = -19.2%.  •  C/w prior study, LVEF has decreased but GLS is fairly stable.  CT Chest With Contrast Diagnostic, CT Abdomen Pelvis With Contrast  Narrative: CT CHEST, ABDOMEN, AND PELVIS WITH IV CONTRAST     HISTORY: Rectal cancer restaging     TECHNIQUE: Radiation dose reduction techniques were utilized, including  automated exposure control and exposure modulation based on body size.   3 mm images were obtained through the chest, abdomen, and pelvis after  the administration of IV contrast.      COMPARISON: 09/13/2022, 06/21/2022     FINDINGS:           CHEST:      Heterogeneous thyroid with stable hypodensities. Port right chest wall  with the catheter terminating at the cavoatrial junction. 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 The heart size is  stable. Ill-defined stranding about the supraclavicular fossa. Multiple  increased in number but not size subcentimeter nodes in the lower neck  and mediastinum. An index right paratracheal node is 6 mm (previously 4  mm), continued attention on follow-up recommended.     Patent central airway. Stable pulmonary nodules including a 3 mm nodule  in the right apex (axial image 37), 4 mm nodule left upper lobe (image  43), sub-4 mm nodule left upper lobe (axial image 47 and  54), and left  lower lobe (image 48).     ABDOMEN:  Liver/Biliary Tract: Hepatic steatosis which limits evaluation for  underlying liver lesions. Cholecystectomy.     Spleen: Within normal limits.     Pancreas: Within normal limits.     Adrenals: Within normal limits.     Kidneys:  Too small to characterize hypodensities.     Bowel:  Low anterior resection with ileostomy takedown. No obstruction  or focal bowel wall thickening. Moderate colonic stool burden, presacral  soft tissue thickening similar in appearance to the prior. Recommend  correlation with tumor markers and continued surveillance.     Peritoneum: Within normal limits.     Vasculature:    Scattered atherosclerosis.     Lymph Nodes:  Within normal limits.                                 PELVIS:                                   Pelvic organs: Bladder incompletely distended. Fluid and/or thickening  in the endometrial canal likely related to phase of menstrual cycle.     Abdominal/Pelvic Wall: Postsurgical changes of ileostomy takedown. Right  lower quadrant anterior skin wound approximating 1 x 2 cm (axial image  113) with packing material in situ. Subjacent subcutaneous soft tissue  inflammatory changes. Multiple collaterals.     Bones: Scattered sclerotic foci similar in appearance to the prior.     Impression: 1. Status post low anterior resection with ileostomy takedown.  2. Right lower quadrant subcutaneous soft tissue wound approximating 1 x  2 cm with packing material in situ. Subjacent stranding likely  postsurgical, however please correlate for cellulitis/infection.  3. Stable pulmonary nodules with slightly larger right paratracheal  node. Continued attention on follow-up recommended.  4. 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  Nonspecific stranding about the left supraclavicular fossa, please  correlate clinically. Ultrasound could be considered to better evaluate  the vasculature of clinically indicated.  5.  Please see above for additional findings/recommendations.     This report was finalized on 1/12/2023 11:42 AM by Dr. Jose Anthony M.D.         ASSESSMENT:   1.  Metastatic rectal cancer.   · Initial rectal ultrasound examination reported T3N0 disease without lymphadenopathy.   · CT scan of chest, abdomen and pelvis reported no lymphadenopathy in the abdomen, pelvis or chest. She does have small pulmonary nodule 6-7 mm and 2 mm with indeterminate feature. There is no solid evidence of metastatic disease otherwise.   · Based on the CT scan and rectal ultrasound imaging studies, this patient had stage IIA (T3N0M0) disease.   · She had PET scan examination on 8/8/2019 which reported a hypermetabolic right upper lobe pulmonary nodule 6 mm with SUV 5.6.  This is suspicious for metastatic disease however it is too small to be biopsied.  This patient may have stage IV disease.   · She initiated concurrent radiation with continuous 5-FU on 8/12/2019.  · Patient finished concurrent chemoradiation therapy.  · Patient underwent surgical resection of the rectal tumor and diverting loop ileostomy on 12/2/2019 with Dr. Ye.  Pathology evaluation reported residual T3 disease, with 1 out of 14 lymph nodes positive for malignancy.  Certainly this patient has at least stage IIIb rectal cancer (T3 N1 M0?)  · On 1/22/2020 adjuvant chemotherapy FOLFOX 6 regimen initiated.   · On 2/5/2022 cycle #2 was delayed secondary to neutropenia.  She was given 3 days of Granix.   · Emend added with cycle 3.  With improved nausea control  · Continuing home Granix x3 days following 5-FU disconnect  · 3/11/2020 due for cycle 4, however, she is experiencing progressive abdominal pain and occasional fevers.   · CT scan performed on 3/13/2020 reveals no evidence of progressive or recurrent disease.  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  · Patient hospitalized 4/24-4/26/20 after cycle 5 of chemotherapy with acute UTI.  CT abdomen/pelvis noting fluid  collection in the presacral region having diminished in size compared to CT dated 3/13/2020.  Patient was evaluated by Dr. Ye while in the hospital with further plans to evaluate possible colovaginal fistula following completion of chemotherapy.  Patient did respond to IV antibiotics and discharged home on oral cefdinir.  · 4/29/2020 cycle 6 of FOLFOX.  Urinary symptoms have resolved   · Patient seen in the [facility] ED on 5/2/2020 for what was suspected to be an allergic reaction.  She called our service on Saturday explaining that she was experiencing hand and face swelling.  She was instructed to proceed to the emergency department at which time she was assessed and prescribed a Medrol Dosepak.  She had just completed her 5-FU and was unhooked on Friday, 5/1/2020.  Her symptoms have resolved in the office on assessment, 5/5/2020.  · The patient had grade 3 hand-foot syndrome based on symptomology.  Patient had cycle #8 of 5-FU on 5/13/2020. Due to her symptoms and poor tolerance to the 5-FU, her treatment dose will be reduced 50% for oxaliplatin and infusional 5-FU.  Bolus 5-FU will be discontinued..  · On 5/21/2020 patient had a PET scan and it showed no evidence of of metastatic disease in the neck, chest, abdomen or pelvis.  There was fluid accumulation/abscess.   · On 5/27/2020 discussed with the patient that we can discontinue chemotherapy at this time.  She will follow-up with Dr. Ye to discuss a possibility to reverse the ileostomy.  We likely will obtain anther PET scan in 3 to 4 months for reassessment.    · Patient was seen by Dr. Ye on 6/19/2019 for evaluation and to discuss possible take down of her ileostomy.  She is scheduled to have a gastrografin enema on 7/2/2020 to evaluate for a fistula, and then a colonoscopy on 7/15/2020, both done by Dr. Ye.  She states that based on the above imaging and procedure findings, she may have a more extensive surgery done or just have her ileostomy  reversed, which would likely be done in August 2020.  This will all be coordinated under the care of Dr. Ye.     · CT scan from 09/09/2020 and also compared to her previous PET scan examination from 05/21/2020 and also the original PET scan from 08/09/2019.  The original PET scan there is a small right upper lobe pulmonary nodule 6 mm with SUV 5.6. So in the 05/2020 PET scan the nodule is still there but activity seems much less and this most recent CT scan examination also documented the preexisting small pulmonary nodule however there is a new left lower lobe pulmonary nodule 8 mm in size and I shared with the patient today this nodule is suspicious for metastatic disease. Laboratory studies reported minimal CEA level 1.32 on 09/09/2020. Liver function panel was only marginally abnormal with the ALT 45, the remaining is normal. However laboratory study today showed worsening AST 55, ALT 95 and alkaline phosphatase 143 but is still normal, total bilirubin 0.3.   · Recommended to have repeat PET scan examination for assessment because the left lower lobe pulmonary nodule is too small to be biopsied, and if the PET scan reports hypermetabolic lesion, I recommended to have stereotactic radiation therapy. Explained to patient that she is not a really good candidate to have thoracotomy for resection because she still has unhealed wound in the abdomen. Stereotactic radiation therapy will likely be a more feasible choice.   · PET scan examination obtained on 9/18/2020 showed metastatic disease.  It reported a few hypermetabolic pulmonary nodules, and some new small pulmonary nodules, all of them highly suspicious for metastatic disease.  She also has hypermetabolic activity in the abdomen and pelvic area which could be related to scar tissue from her recent surgery versus metastatic disease.  Nevertheless overall picture fits with metastatic cancer.  · Discussed with the patient on 9/20/2020, we reviewed the PET scan  images together, and I recommended to have systematic chemotherapy, I do not think stereotactic reading therapy to the hypermetabolic lesions in the lungs warranted at this time because even those will be treated with reading therapy, she will still need systematic chemotherapy.  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  I did discuss with the patient using anti-EGFR monoclonal antibody versus anti-VEGF monoclonal antibody.     · Palliative chemotherapy cycle#1 FOLFIRI was started on 10/13/2020.  No bolus 5-FU given considering previous poor tolerance.    · NGS study from [facility] reported positive for [place] mutation.  Microsatellite stable, mutation burden 5/Mb.   · Discussed with the patient 10/27/2020, because of the K-[place] mutation, she is not a candidate for anti-EGFR monoclonal antibody such as Erbitux or vectibix.  She could be a candidate for anti-VEGF monoclonal antibody, however because of active wound, she is not a candidate right now at this moment.  · Patient seen in the ED for acute right neck pain on 11/16/2020.  CT angiogram as well as CT of the cervical spine performed with no acute findings but notably one of her pulmonary nodules, left upper lobe, somewhat decreased in size.  Patient given prednisone pack.  Further details below.  · Cycle #6 chemotherapy will be resumed on 1/5/2021.  Started back on original irinotecan.  · PET scan examination obtained on 1/12/2021 after cycle #6 chemotherapy reported improved pulmonary nodule hypermetabolic activity.  Confirmed these are metastatic lesions.  · Patient is evaluated 1/19/2020, will continue cycle #7 FOLFIRI chemotherapy.  However Avastin will be on hold see next section.   · Patient was reevaluated on 2/2/2021, will continue chemotherapy cycle #8 FOLFIRI.  Avastin / biosimilar will be added.    · She talked to [facility] specialist in mid February 2021, and had recommended  palliative chemotherapy without surgical intervention of metastatic lung lesions.   · On 3/2/2021, patient will proceed with cycle #10 FOLFIRI plus bevacizumab.  After 12 cycles, plan to start maintenance treatment.  · 3/16/2021 cycle 11.  Plus bevacizumab.  · 3/30/2021 cycle 12 FOLFIRI plus bevacizumab.  Having issues with worsening nausea despite premeds with dexamethasone, Aloxi, Emend, and Zofran at home.  Patient is requesting a dose of Phenergan, which is helped her in the past.  We will give this to her with treatment today.  · PET scan examination on 4/6/2021 reported no evidence of hypermetabolic metastatic lesion.  · Discussed with the patient on 4/13/2021, we reviewed the PET scan results.  We recommended to switch to maintenance chemotherapy without irinotecan.  We will continue 5-FU and Avastin every 2 weeks.  We discussed possibility of switching to oral Xeloda treatment.  Discussed with the patient for side effects more so with hand-foot syndrome.  Patient is agreeable.   · Xeloda 2000 mg twice daily 7 days on, 7 days off along with Avastin initiated 4/27/2021.  · After only 5 doses of Xeloda significant hand-foot syndrome, Xeloda held. Patient requesting to be transitioned back to infusional 5-FU, as she felt that she tolerated infusional 5-FU without any problem.  The patient will receive 5-FU leucovorin and Avastin every 2 weeks.  Xeloda discontinued.  · 5/25/2021 continued cycle #4 maintenance 5-FU, leucovorin, Avastin tolerating this much better so far, we will continue this. Recommended to have 12 cycles of maintenance chemotherapy, then obtain PET scan for reevaluation.  We could discuss further treatment plan at that time.  · 9/14/2021 patient due for cycle 12 of additional maintenance 5-FU/leucovorin plus Avastin.  She continues to tolerate treatment well overall.  She is anxious in the office today with her Mediport not getting blood at first and also with upcoming scans being heavy on her  mind.  She was instructed to take one of her Klonopin pills while here to help calm her mind.  Heart rate did improve from 140s down to 112.  Proceed with treatment today as scheduled.  · PET scan examination on 9/24/2021 reported further shrinking of the right upper lobe tiny pulmonary nodule.  No other new lesions.  · Discussed with patient on 9/24/2021 about further shrinking of the right upper lobe pulmonary nodule and no evidence for other new lesions. We recommended to have chemo break for 3 months with repeated CT scan for reevaluation.  · Recent CT scan for chest 12/14/2021 and abdomen CT from 11/29/2021 reported no evidence for active disease. The right upper lobe pulmonary nodule, left lower lobe pulmonary nodule minimal about 4 mm and stable. I discussed with patient today on 12/21/2021, recommended to give her another 3 months chemotherapy holiday and obtain CT scan afterwards for reevaluation. After discussion, patient is agreeable.   · CT scan examination for chest abdomen and pelvis obtained on 3/15/2022 reported a slight increase of the left upper lobe pulmonary nodule 5 mm, from previous 3 mm.  The other pulmonary nodules were stable in size.  There is also small left upper lobe groundglass changes.   · I reviewed images studies with the patient today on 3/23/2022, compared to multiple previous images including CT chest CT and PET scan, suspected disease progression.  Discussed with the patient, and I recommended to resume maintenance chemotherapy with 5-FU plus leucovorin plus Avastin repeating every 2 weeks, and obtaining CT scan for reassessment in 3 months.  Patient is agreeable.  · Patient resumed maintenance chemotherapy on 3/29/2022 with 5-FU leucovorin and Avastin.   · 4/26/2022, proceed with cycle #27 maintenance 5-FU, leucovorin, and Avastin.  Patient continues to tolerate treatment well.    · On 5/10/2022, we will proceed ahead with cycle #28 maintenance chemotherapy.  Plan to have CT  scan after cycle #30.  · 5/24/2022 cycle 29 maintenance chemotherapy.  Continue to tolerate well.  · 6/7/2022 cycle #30 maintenance chemotherapy, continuing to tolerate well.   · CT scan for chest abdomen pelvis with IV contrast obtained on 6/21/2022 reported sub-6 mm pulmonary nodules either stable or slightly smaller.  We reviewed the images with the patient together.  We discussed with the patient and recommended to hold chemotherapy for now with repeating CT scan in 3 months for reassessment.  · CT scan of the chest abdomen pelvis 9/13/2022 reported stable condition, no evidence for recurrent or metastatic colon cancer.  · On 1/10/2023 patient had a CT chest abdomen pelvis with reported no evidence for disease progression.  Laboratory study also showed normal CEA.       2.   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  Low-dose Lovenox discontinued and initiated Xarelto 20 mg daily.  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  · Patient held Xarelto for 2 days prior to MediPort removal from the left chest wall and placement of new port in the right chest wall on 12/18/2020.  She resumed Xarelto that evening.  · Progressive swelling in the neck, face, upper extremities prompting hospitalization with CT scan showing thrombosis in the right subclavian vein and SVC.  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  Although she had been off of  Xarelto for a few days, clearly Xarelto was not prohibiting progression of her thrombosis after she resumed treatment and there was some evidence to suggest thrombosis had been present at least in the innominate vein on prior scan in September but now appears chronic.  Current acute thrombosis involving SVC and right subclavian.  · Patient was admitted and placed on heparin drip  · Patient was evaluated by vascular surgery and intervention was not recommended for thrombolysis/thrombectomy.  · On 12/22/2020, the patient developed worsening shortness of breath, increasing upper extremity edema consistent with worsening SVC syndrome.  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  · Symptoms improved and patient was discharged 12/24/2020 on Lovenox 100 mg every 12 hours.    · Returns 12/29/2020 for evaluation continuing Lovenox, overall tolerating it well.  No bleeding issues.  · Improved edema 1/5/2021.  Will continue Lovenox weight-based every 12 hours.  · On 1/19/2021 and 2/2/2021, patient reports improved facial swelling.  She however does have being bruise on her abdomen wall where she does Lovenox injection.  However she does not have a spontaneous epistaxis, gum bleeding or other bleeding.   · On 2/2/2021, we discussed that side effects of Avastin/biosimilar related to thrombosis.  Since this patient already has been on Lovenox, I think the benefits from treatment for her cancer will outweigh that risk of thrombosis, will proceed ahead with Avastin.  I cautioned patient to watch out for signs of worsening thrombosis patient voiced understanding.   · On 3/16/2021, no evidence of worsening DVT, continue Lovenox.  · 5/11/2021 Lovenox dosing will be adjusted due to patient's weight loss.  We discussed she needs 1 mg/kg.  Her syringes are 100 mg syringes she will do 0.9 mL subcu every 12  hours.  · 8/3/2021 Patient continues to have bruising on abdomen from lovenox injections.  Will need to monitor weight and adjust dosing in future if further weight loss.   · Stable condition on 9/28/2021.  Continue Lovenox anticoagulation.    · Patient reports no chest pain no dyspnea no leg edema. However she had bruising and also most recently abnormal small abscess for which she actually went to ER on 11/29/2021, had I&D. The wound is healing. No further drainage. Denies fever sweating or chills. After discussion, she will continue Lovenox injection for now.   · On 3/23/2022, patient reports good tolerance of Lovenox.  Nevertheless she still has small quantity of pus in the left lower abdomen abscess, she squeezes it every day to prevent it became worse.  She reports no fever sweating chills.  Recommended to start Augmentin 875 mg twice a day for 7 days.  She will continue Lovenox indefinitely.  · On 4/12/2022, patient reports resolution of the small abscess at left lower abdominal wall.   · On 5/10/2022, patient continues on Lovenox indefinitely.  No easy bleeding or bruising.  · 6/23/2022 continuing on Lovenox 1 mg/kg at a dose of 100 mg (patient weight is 96.6 kg today) every 12 hours.  · I will 1/17/2023, patient reports good tolerance, Lovenox will be continued.      3.  This patient also has strong family history for malignancy   · The patient had appointment with genetic counseling on September 3, 2019.  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    4.  Mild anemia.   · She also has history of iron deficiency.  Iron studies reveal iron saturation of 10%.  She was started on oral iron but was unable to tolerate due to GI side effects.   · Status post Injectafer 2/5/2020 and 2/12/2020   · Improved hemoglobin 13.5 on 1/5/2021.  · 3/16/2020 when hemoglobin now up to 16.2, hematocrit 47.6.  Patient admits that she has started smoking again, and I have encouraged her to quit.  She denies any snoring or sleep  apnea diagnosis.  Patient is not taking oral iron.  We will closely monitor.  · 3/30/2020 hemoglobin 15.7, HCT 47.5.  Patient states that she has cut back significantly on her smoking, although not quit yet.  I have encouraged her to continue working on this.  We will continue to closely monitor.  · Hemoglobin 14.6 on 6/8/2021 at the meantime he has worsening macrocytosis .6.    · Laboratory studies 6/22/2021 reported excellent folate > 20 ng/mL, however low normal B12 level 357 pg/mL.    · On 7/6/2021, normal hemoglobin 15.8, .9 and HCT 47.2%.  Patient was asked to start oral vitamin B12 at 1000 mcg daily.   · 9/14/2021 hemoglobin 17.0, hematocrit 52.1.  Monitor.  · On 9/28/2021 hemoglobin 16.1 hematocrit 48.5%, continue to monitor.   · Lab study on 12/14/2021 reported excellent ferritin 296 and iron saturation 25% with free iron 104 TIBC 424. Hemoglobin was 15.2 with .2.   · Normal hemoglobin 14.6 on 3/15/2022.  Iron study reported normal ferritin 129.5 ng/mL however slightly low iron saturation 11%.  Continue to monitor for now.  · 4/26/2022, hemoglobin 14.8.  · 6/7/2022 hemoglobin 15.5.  · On 9/20/2022 Hb 15.7, .1.  · On 1/10/2023 normal hemoglobin 14.7 MCV 95.0.    5.  Peripheral neuropathy secondary to chemotherapy.   · This is mild involving bilateral hands and feet as reported on 9/16/2020.   · Will avoid chemotherapy with oxaliplatin.  · Stable mild neuropathy as of 11/10/2020  · Patient reports worsening peripheral neuropathy and cycle #5 chemotherapy on 12/8/2020 with and without irinotecan.   · On 1/5/2021, patient reports improvement of peripheral neuropathy, will add irinotecan back to chemotherapy.  Continue to monitor and adjust medication.  · On 3/2/2021, patient reports no worsening of peripheral neuropathy after restarting irinotecan.   · This remains stable, may be slightly better as reported on 3/23/2022..   · No worsening since resuming chemotherapy on  3/29/2022.  · On 6/23/2022 peripheral neuropathy remains stable.  · No worsening peripheral neuropathy today on 1/17/2023.     6.  History of hand-foot syndrome grade 3.    · It become so significant after cycle #7 FOLFOX treatment.  Discussed with patient on 5/13/2020, will discontinue the bolus 5-FU dose, and also decrease 50% of the infusion dose through the pump.   · We also use Medrol Dosepak for possible recurrent symptomology.  She responded this well.   · Her hand-foot syndrome improved.  · Under current treatment with FOLFIRI, patient not receiving 5-FU bolus.  No recurrent issues.   · Patient will be switched to maintenance chemotherapy using Xeloda in late April 2021.  · Following 5 doses of Xeloda, significant hand-foot syndrome with pain in the feet, significant erythema.  Xeloda held.  Will be further discussed with Dr. Nguyen to determine if the patient will resume 5-FU versus a dose reduction to Xeloda.  · Aggressive emollient moisturizer use twice daily for the past week and patient reports improvement in the dryness and erythema.  Xeloda will now be discontinued and patient will switch back to 5-FU.  · As of 5/25/2021 dryness and erythema/hyperpigmentation continues to improve.  Xeloda has been discontinued.  · 6/8/2021, patient reports much improved hand-foot syndrome, with remnant pigmentation on palms.  · On 7/6/2021, patient reports and had a some flareup of hand-foot syndrome, however improved after aggressive moisturizing cream and the urea cream.  Overall much tolerated infusional 5-FU compared to Xeloda.    · 7/20/2021 continues to have mild hyperpigmentation of her hands and feet bilaterally, she continues aggressive moisturization with utter balm with urea.  She also reports some soreness of her tailbone, and we discussed that this is likely due to loss of weight and muscle mass.  I advised her to go ahead and apply moisturizer to this area as there is one tiny fissure.  Also recommended  using a waffle pad or doughnut to sit on.  · 8/3/2021 patient reports her hand foot symptoms are stable and without worsening.  Continue to monitor.  · Symptoms stable, typically flaring about 24 hours after she is unhooked from her 5-FU infusional ball and then settling down with some mild peeling.   · Since off chemotherapy no specific complaints.   · No recurrent symptoms after resuming chemotherapy on 3/29/2022.  · 5/24/2022 does report some mild symptoms about 24 hours after disconnect maintenance chemotherapy.  She is using utterly smooth twice a day.  · On 9/20/2022 patient reports mild peripheral neuropathy.  · Stable condition as reported on 1/17/2023.      7.  Depression.  Patient seen by JULIET Davenport on 11/9/2020.  She was started on mirtazapine.  Lexapro was discontinued.  This has definitely improved her mood with the patient feeling overall much better.  She is sleeping better.  Appetite is improved with her actually eating more than she wants to.    · Condition is stable.  She plans to continue follow-up with supportive oncology.      8.  Proteinuria: 3/30/2020: Patient is 2+ protein in her urine.  We will continue with Avastin and closely monitor.  No need for 24-hour urine at this time.  · Persistent 2+ proteinuria on 4/13/2021.  continue to monitor.  · 5/25/2021 protein urine only 1+.    · 6/22/2021 persistent 1+ proteinuria.  Continue to monitor.  · 7/6/2021, trace protein.  Continue Avastin treatment and monitoring.  · 8/3/2021 1+ protein, stable from last cycle.   · On 8/17/2021, urine protein 2+.  She also had a 1+ leukocytes.  Patient is not symptomatic such as fever, dysuria or gross hematuria, however urine culture obtained, with >100,000 E. Faecalis. Patient treated with Levaquin.   · 6/7/2022 urine negative for protein    9.  Tachycardia:   · Patient was seen by Dr. Bustos cardiologist on 4/6/2021.   · On 9/14/2021 patient more tachycardic in the setting of anxiety.  Improved  with Klonopin.  · 4/26/2022 heart rate 88.  · On 5/10/2022 heart rate 99.  · Heart rate 87 on 6/23/2022.  · Heart rate 81 on 9/20/2022.  · Patient is also on low-dose Toprol.         PLAN:    1.   2. Continue port flush every 6 weeks.  3. Continue Lovenox at 1 mg/kg twice daily.   4. Continue wound care followed by Dr. Ye.  5. Continue oral B12 1000 mcg daily.  6. We will obtain CT scan for chest abdomen pelvis with both IV and oral contrast in 3 months for reassessment.  7. Obtain laboratory studies CBC CMP CEA level 1 week prior to next MD visit in 3 months.  8. Patient will come back to see me in 3 months, to discuss lab results and CT scan report, and to discuss further management plan.  9. Call/return sooner should she develop any new concerns or problems.        I independently reviewed CT scan images obtained on 1/10/2023, and compared to those images from 9/13/2022, and 3/15/2022.  I shared those images with the patient today.    I discussed with the patient about laboratory results and further management plan.  Patient voiced understanding and agreeable.      Dong Nguyen MD PhD  1/17/2023.       CC:  Deepika Vela III NP-C   Padmaja Ye MD

## 2023-01-18 NOTE — ASSESSMENT & PLAN NOTE
Patient's depression is worsening off lexapro.   Feels she is short tempered.     Discussed options and prior treatment: as lexapro was effective for depression, willing to restart at lower dose.  Will start at 10 mg daily. She will let me know if worsens or doesn't improve.   Open to behavioral health virtual visits.      Generally would add bupropion in addition for decreased libido but she doesn't think it helped in the past.   Other options to consider: remeron, viibryd

## 2023-01-23 ENCOUNTER — OFFICE VISIT (OUTPATIENT)
Dept: SURGERY | Facility: CLINIC | Age: 43
End: 2023-01-23
Payer: COMMERCIAL

## 2023-01-23 VITALS
HEIGHT: 66 IN | SYSTOLIC BLOOD PRESSURE: 110 MMHG | TEMPERATURE: 97 F | DIASTOLIC BLOOD PRESSURE: 68 MMHG | HEART RATE: 85 BPM | OXYGEN SATURATION: 96 % | WEIGHT: 205.6 LBS | BODY MASS INDEX: 33.04 KG/M2

## 2023-01-23 DIAGNOSIS — Z85.048 HISTORY OF RECTAL CANCER: Primary | ICD-10-CM

## 2023-01-23 PROCEDURE — 99024 POSTOP FOLLOW-UP VISIT: CPT | Performed by: PHYSICIAN ASSISTANT

## 2023-01-23 RX ORDER — NORTRIPTYLINE HYDROCHLORIDE 25 MG/1
25 CAPSULE ORAL NIGHTLY
COMMUNITY
Start: 2023-01-21 | End: 2023-02-15 | Stop reason: SDUPTHER

## 2023-01-23 RX ORDER — ESTRADIOL 10 UG/1
INSERT VAGINAL
COMMUNITY
Start: 2023-01-18

## 2023-01-23 RX ORDER — ESTRADIOL 10 UG/1
10 INSERT VAGINAL 2 TIMES WEEKLY
COMMUNITY
Start: 2023-01-19 | End: 2024-01-19

## 2023-01-23 NOTE — PROGRESS NOTES
"Carole Weaver is a 42 y.o. female in for follow up of History of rectal cancer    Pt with Hx of pT3N1, stage IIIb rectal cancer S/P laparoscopic LAR with diverting loop ileostomy 12/02/2019 and Ileostomy takedown 11/14/2022.  She presents today for a RLQ wound check.    packing daily with Iodoform gauze.   Concerned as it does not seem to be getting any more shallow.     /68 (BP Location: Left arm, Patient Position: Sitting, Cuff Size: Small Adult)   Pulse 85   Temp 97 °F (36.1 °C) (Temporal)   Ht 167.6 cm (66\")   Wt 93.3 kg (205 lb 9.6 oz)   LMP  (LMP Unknown)   SpO2 96%   Breastfeeding No   BMI 33.18 kg/m²   Body mass index is 33.18 kg/m².    PE:  Physical Exam  Constitutional:       General: She is not in acute distress.     Appearance: She is well-developed.   HENT:      Head: Normocephalic and atraumatic.   Abdominal:      General: There is no distension.      Palpations: Abdomen is soft.      Comments: Abdomen: Soft, non-distended. RLQ wound measuring 1.75 cm in depth.    Musculoskeletal:         General: Normal range of motion.   Neurological:      Mental Status: She is alert.   Psychiatric:         Thought Content: Thought content normal.       Assessment:   1. History of rectal cancer    - pT3N1, stage IIIb     Plan:  - Wound repacked with 1/4 inch Iodoform gauze.   - Continue packing wound daily.   - Pt with LAR Syndrome. Telehealth visit with Dr. Ye scheduled 02/03/2023 to discuss bowel movements.  - Follow up in 2 weeks for wound recheck.           "

## 2023-02-05 ENCOUNTER — READMISSION MANAGEMENT (OUTPATIENT)
Dept: CALL CENTER | Facility: HOSPITAL | Age: 43
End: 2023-02-05
Payer: COMMERCIAL

## 2023-02-05 ENCOUNTER — HOSPITAL ENCOUNTER (INPATIENT)
Facility: HOSPITAL | Age: 43
LOS: 1 days | Discharge: HOME | DRG: 300 | End: 2023-02-05
Attending: EMERGENCY MEDICINE | Admitting: INTERNAL MEDICINE
Payer: COMMERCIAL

## 2023-02-05 ENCOUNTER — APPOINTMENT (OUTPATIENT)
Dept: CARDIOLOGY | Facility: HOSPITAL | Age: 43
DRG: 300 | End: 2023-02-05
Payer: COMMERCIAL

## 2023-02-05 ENCOUNTER — APPOINTMENT (OUTPATIENT)
Dept: CT IMAGING | Facility: HOSPITAL | Age: 43
DRG: 300 | End: 2023-02-05
Payer: COMMERCIAL

## 2023-02-05 VITALS
TEMPERATURE: 98.3 F | DIASTOLIC BLOOD PRESSURE: 87 MMHG | WEIGHT: 200.6 LBS | BODY MASS INDEX: 32.24 KG/M2 | HEIGHT: 66 IN | HEART RATE: 97 BPM | RESPIRATION RATE: 18 BRPM | OXYGEN SATURATION: 92 % | SYSTOLIC BLOOD PRESSURE: 128 MMHG

## 2023-02-05 DIAGNOSIS — M54.2 NECK PAIN ON LEFT SIDE: Primary | ICD-10-CM

## 2023-02-05 DIAGNOSIS — D68.51 FACTOR V LEIDEN: ICD-10-CM

## 2023-02-05 DIAGNOSIS — Z85.038 HISTORY OF COLON CANCER: ICD-10-CM

## 2023-02-05 DIAGNOSIS — Z86.718 HISTORY OF DVT (DEEP VEIN THROMBOSIS): ICD-10-CM

## 2023-02-05 LAB
ALBUMIN SERPL-MCNC: 3.8 G/DL (ref 3.5–5.2)
ALBUMIN/GLOB SERPL: 1.2 G/DL
ALP SERPL-CCNC: 95 U/L (ref 39–117)
ALT SERPL W P-5'-P-CCNC: 20 U/L (ref 1–33)
ANION GAP SERPL CALCULATED.3IONS-SCNC: 4.1 MMOL/L (ref 5–15)
ANION GAP SERPL CALCULATED.3IONS-SCNC: 7.9 MMOL/L (ref 5–15)
APTT PPP: 153.1 SECONDS (ref 22.7–35.4)
APTT PPP: 26.1 SECONDS (ref 22.7–35.4)
AST SERPL-CCNC: 16 U/L (ref 1–32)
BASOPHILS # BLD AUTO: 0.02 10*3/MM3 (ref 0–0.2)
BASOPHILS # BLD AUTO: 0.02 10*3/MM3 (ref 0–0.2)
BASOPHILS NFR BLD AUTO: 0.3 % (ref 0–1.5)
BASOPHILS NFR BLD AUTO: 0.4 % (ref 0–1.5)
BH CV UPPER VENOUS LEFT AXILLARY AUGMENT: NORMAL
BH CV UPPER VENOUS LEFT AXILLARY COLOR: 1
BH CV UPPER VENOUS LEFT AXILLARY COMPRESS: NORMAL
BH CV UPPER VENOUS LEFT AXILLARY PHASIC: NORMAL
BH CV UPPER VENOUS LEFT AXILLARY SPONT: NORMAL
BH CV UPPER VENOUS LEFT AXILLARY THROMBUS: NORMAL
BH CV UPPER VENOUS LEFT BASILIC FOREARM COMPRESS: NORMAL
BH CV UPPER VENOUS LEFT BASILIC UPPER COLOR: 1
BH CV UPPER VENOUS LEFT BASILIC UPPER COMPRESS: NORMAL
BH CV UPPER VENOUS LEFT BASILIC UPPER THROMBUS: NORMAL
BH CV UPPER VENOUS LEFT BRACHIAL COLOR: 1
BH CV UPPER VENOUS LEFT BRACHIAL COMPRESS: NORMAL
BH CV UPPER VENOUS LEFT BRACHIAL THROMBUS: NORMAL
BH CV UPPER VENOUS LEFT CEPHALIC FOREARM COMPRESS: NORMAL
BH CV UPPER VENOUS LEFT CEPHALIC UPPER COMPRESS: NORMAL
BH CV UPPER VENOUS LEFT INTERNAL JUGULAR AUGMENT: NORMAL
BH CV UPPER VENOUS LEFT INTERNAL JUGULAR COMPRESS: NORMAL
BH CV UPPER VENOUS LEFT INTERNAL JUGULAR PHASIC: NORMAL
BH CV UPPER VENOUS LEFT INTERNAL JUGULAR SPONT: NORMAL
BH CV UPPER VENOUS LEFT RADIAL COMPRESS: NORMAL
BH CV UPPER VENOUS LEFT SUBCLAVIAN AUGMENT: NORMAL
BH CV UPPER VENOUS LEFT SUBCLAVIAN COLOR: 1
BH CV UPPER VENOUS LEFT SUBCLAVIAN COMPRESS: NORMAL
BH CV UPPER VENOUS LEFT SUBCLAVIAN PHASIC: NORMAL
BH CV UPPER VENOUS LEFT SUBCLAVIAN SPONT: NORMAL
BH CV UPPER VENOUS LEFT SUBCLAVIAN THROMBUS: NORMAL
BH CV UPPER VENOUS LEFT ULNAR COMPRESS: NORMAL
BH CV UPPER VENOUS RIGHT INTERNAL JUGULAR AUGMENT: NORMAL
BH CV UPPER VENOUS RIGHT INTERNAL JUGULAR COLOR: 1
BH CV UPPER VENOUS RIGHT INTERNAL JUGULAR COMPRESS: NORMAL
BH CV UPPER VENOUS RIGHT INTERNAL JUGULAR PHASIC: NORMAL
BH CV UPPER VENOUS RIGHT INTERNAL JUGULAR SPONT: NORMAL
BH CV UPPER VENOUS RIGHT SUBCLAVIAN AUGMENT: NORMAL
BH CV UPPER VENOUS RIGHT SUBCLAVIAN COMPRESS: NORMAL
BH CV UPPER VENOUS RIGHT SUBCLAVIAN PHASIC: NORMAL
BH CV UPPER VENOUS RIGHT SUBCLAVIAN SPONT: NORMAL
BILIRUB SERPL-MCNC: 0.3 MG/DL (ref 0–1.2)
BUN SERPL-MCNC: 6 MG/DL (ref 6–20)
BUN SERPL-MCNC: 7 MG/DL (ref 6–20)
BUN/CREAT SERPL: 8.8 (ref 7–25)
BUN/CREAT SERPL: 9.7 (ref 7–25)
CALCIUM SPEC-SCNC: 8.7 MG/DL (ref 8.6–10.5)
CALCIUM SPEC-SCNC: 9.2 MG/DL (ref 8.6–10.5)
CHLORIDE SERPL-SCNC: 103 MMOL/L (ref 98–107)
CHLORIDE SERPL-SCNC: 105 MMOL/L (ref 98–107)
CO2 SERPL-SCNC: 26.9 MMOL/L (ref 22–29)
CO2 SERPL-SCNC: 27.1 MMOL/L (ref 22–29)
CREAT SERPL-MCNC: 0.68 MG/DL (ref 0.57–1)
CREAT SERPL-MCNC: 0.72 MG/DL (ref 0.57–1)
DEPRECATED RDW RBC AUTO: 40 FL (ref 37–54)
DEPRECATED RDW RBC AUTO: 41.5 FL (ref 37–54)
EGFRCR SERPLBLD CKD-EPI 2021: 106.5 ML/MIN/1.73
EGFRCR SERPLBLD CKD-EPI 2021: 111 ML/MIN/1.73
EOSINOPHIL # BLD AUTO: 0.2 10*3/MM3 (ref 0–0.4)
EOSINOPHIL # BLD AUTO: 0.28 10*3/MM3 (ref 0–0.4)
EOSINOPHIL NFR BLD AUTO: 3.5 % (ref 0.3–6.2)
EOSINOPHIL NFR BLD AUTO: 3.8 % (ref 0.3–6.2)
ERYTHROCYTE [DISTWIDTH] IN BLOOD BY AUTOMATED COUNT: 12 % (ref 12.3–15.4)
ERYTHROCYTE [DISTWIDTH] IN BLOOD BY AUTOMATED COUNT: 12.4 % (ref 12.3–15.4)
GLOBULIN UR ELPH-MCNC: 3.2 GM/DL
GLUCOSE SERPL-MCNC: 140 MG/DL (ref 65–99)
GLUCOSE SERPL-MCNC: 87 MG/DL (ref 65–99)
HCT VFR BLD AUTO: 39.2 % (ref 34–46.6)
HCT VFR BLD AUTO: 43.3 % (ref 34–46.6)
HGB BLD-MCNC: 13.2 G/DL (ref 12–15.9)
HGB BLD-MCNC: 14.4 G/DL (ref 12–15.9)
IMM GRANULOCYTES # BLD AUTO: 0.01 10*3/MM3 (ref 0–0.05)
IMM GRANULOCYTES # BLD AUTO: 0.02 10*3/MM3 (ref 0–0.05)
IMM GRANULOCYTES NFR BLD AUTO: 0.2 % (ref 0–0.5)
IMM GRANULOCYTES NFR BLD AUTO: 0.3 % (ref 0–0.5)
INR PPP: 1.06 (ref 0.9–1.1)
LYMPHOCYTES # BLD AUTO: 1.21 10*3/MM3 (ref 0.7–3.1)
LYMPHOCYTES # BLD AUTO: 1.73 10*3/MM3 (ref 0.7–3.1)
LYMPHOCYTES NFR BLD AUTO: 21.8 % (ref 19.6–45.3)
LYMPHOCYTES NFR BLD AUTO: 23 % (ref 19.6–45.3)
MAXIMAL PREDICTED HEART RATE: 177 BPM
MCH RBC QN AUTO: 30.2 PG (ref 26.6–33)
MCH RBC QN AUTO: 30.6 PG (ref 26.6–33)
MCHC RBC AUTO-ENTMCNC: 33.3 G/DL (ref 31.5–35.7)
MCHC RBC AUTO-ENTMCNC: 33.7 G/DL (ref 31.5–35.7)
MCV RBC AUTO: 89.7 FL (ref 79–97)
MCV RBC AUTO: 92.1 FL (ref 79–97)
MONOCYTES # BLD AUTO: 0.36 10*3/MM3 (ref 0.1–0.9)
MONOCYTES # BLD AUTO: 0.53 10*3/MM3 (ref 0.1–0.9)
MONOCYTES NFR BLD AUTO: 6.7 % (ref 5–12)
MONOCYTES NFR BLD AUTO: 6.8 % (ref 5–12)
NEUTROPHILS NFR BLD AUTO: 3.47 10*3/MM3 (ref 1.7–7)
NEUTROPHILS NFR BLD AUTO: 5.36 10*3/MM3 (ref 1.7–7)
NEUTROPHILS NFR BLD AUTO: 65.8 % (ref 42.7–76)
NEUTROPHILS NFR BLD AUTO: 67.4 % (ref 42.7–76)
NRBC BLD AUTO-RTO: 0 /100 WBC (ref 0–0.2)
NRBC BLD AUTO-RTO: 0 /100 WBC (ref 0–0.2)
PLATELET # BLD AUTO: 161 10*3/MM3 (ref 140–450)
PLATELET # BLD AUTO: 228 10*3/MM3 (ref 140–450)
PMV BLD AUTO: 9.4 FL (ref 6–12)
PMV BLD AUTO: 9.5 FL (ref 6–12)
POTASSIUM SERPL-SCNC: 3.7 MMOL/L (ref 3.5–5.2)
POTASSIUM SERPL-SCNC: 3.8 MMOL/L (ref 3.5–5.2)
PROT SERPL-MCNC: 7 G/DL (ref 6–8.5)
PROTHROMBIN TIME: 13.9 SECONDS (ref 11.7–14.2)
RBC # BLD AUTO: 4.37 10*6/MM3 (ref 3.77–5.28)
RBC # BLD AUTO: 4.7 10*6/MM3 (ref 3.77–5.28)
SODIUM SERPL-SCNC: 136 MMOL/L (ref 136–145)
SODIUM SERPL-SCNC: 138 MMOL/L (ref 136–145)
STRESS TARGET HR: 150 BPM
WBC NRBC COR # BLD: 5.27 10*3/MM3 (ref 3.4–10.8)
WBC NRBC COR # BLD: 7.94 10*3/MM3 (ref 3.4–10.8)

## 2023-02-05 PROCEDURE — 96376 TX/PRO/DX INJ SAME DRUG ADON: CPT

## 2023-02-05 PROCEDURE — 96366 THER/PROPH/DIAG IV INF ADDON: CPT

## 2023-02-05 PROCEDURE — 0 IOPAMIDOL PER 1 ML: Performed by: INTERNAL MEDICINE

## 2023-02-05 PROCEDURE — 99284 EMERGENCY DEPT VISIT MOD MDM: CPT

## 2023-02-05 PROCEDURE — 93971 EXTREMITY STUDY: CPT

## 2023-02-05 PROCEDURE — 25510000001 IOPAMIDOL PER 1 ML: Performed by: INTERNAL MEDICINE

## 2023-02-05 PROCEDURE — 85730 THROMBOPLASTIN TIME PARTIAL: CPT | Performed by: INTERNAL MEDICINE

## 2023-02-05 PROCEDURE — 85730 THROMBOPLASTIN TIME PARTIAL: CPT | Performed by: EMERGENCY MEDICINE

## 2023-02-05 PROCEDURE — 25010000002 HEPARIN (PORCINE) 25000-0.45 UT/250ML-% SOLUTION: Performed by: EMERGENCY MEDICINE

## 2023-02-05 PROCEDURE — 25010000002 LORAZEPAM PER 2 MG: Performed by: EMERGENCY MEDICINE

## 2023-02-05 PROCEDURE — 36415 COLL VENOUS BLD VENIPUNCTURE: CPT | Performed by: NURSE PRACTITIONER

## 2023-02-05 PROCEDURE — 80053 COMPREHEN METABOLIC PANEL: CPT | Performed by: EMERGENCY MEDICINE

## 2023-02-05 PROCEDURE — 25010000002 HEPARIN (PORCINE) PER 1000 UNITS: Performed by: EMERGENCY MEDICINE

## 2023-02-05 PROCEDURE — 85610 PROTHROMBIN TIME: CPT | Performed by: EMERGENCY MEDICINE

## 2023-02-05 PROCEDURE — 96365 THER/PROPH/DIAG IV INF INIT: CPT

## 2023-02-05 PROCEDURE — 96375 TX/PRO/DX INJ NEW DRUG ADDON: CPT

## 2023-02-05 PROCEDURE — 85025 COMPLETE CBC W/AUTO DIFF WBC: CPT | Performed by: NURSE PRACTITIONER

## 2023-02-05 PROCEDURE — 85025 COMPLETE CBC W/AUTO DIFF WBC: CPT | Performed by: EMERGENCY MEDICINE

## 2023-02-05 PROCEDURE — 99222 1ST HOSP IP/OBS MODERATE 55: CPT | Performed by: INTERNAL MEDICINE

## 2023-02-05 PROCEDURE — 70498 CT ANGIOGRAPHY NECK: CPT

## 2023-02-05 RX ORDER — ACETAMINOPHEN 325 MG/1
650 TABLET ORAL EVERY 4 HOURS PRN
Status: DISCONTINUED | OUTPATIENT
Start: 2023-02-05 | End: 2023-02-05 | Stop reason: HOSPADM

## 2023-02-05 RX ORDER — LORAZEPAM 2 MG/ML
0.5 INJECTION INTRAMUSCULAR ONCE
Status: COMPLETED | OUTPATIENT
Start: 2023-02-05 | End: 2023-02-05

## 2023-02-05 RX ORDER — HEPARIN SODIUM 5000 [USP'U]/ML
80 INJECTION, SOLUTION INTRAVENOUS; SUBCUTANEOUS ONCE
Status: COMPLETED | OUTPATIENT
Start: 2023-02-05 | End: 2023-02-05

## 2023-02-05 RX ORDER — HEPARIN SODIUM 10000 [USP'U]/100ML
16.6 INJECTION, SOLUTION INTRAVENOUS
Status: DISCONTINUED | OUTPATIENT
Start: 2023-02-05 | End: 2023-02-05

## 2023-02-05 RX ORDER — CLONAZEPAM 1 MG/1
1 TABLET ORAL 3 TIMES DAILY PRN
Status: DISCONTINUED | OUTPATIENT
Start: 2023-02-05 | End: 2023-02-05 | Stop reason: HOSPADM

## 2023-02-05 RX ORDER — ESCITALOPRAM OXALATE 10 MG/1
10 TABLET ORAL DAILY
Status: DISCONTINUED | OUTPATIENT
Start: 2023-02-05 | End: 2023-02-05 | Stop reason: HOSPADM

## 2023-02-05 RX ORDER — SODIUM CHLORIDE 0.9 % (FLUSH) 0.9 %
10 SYRINGE (ML) INJECTION EVERY 12 HOURS SCHEDULED
Status: DISCONTINUED | OUTPATIENT
Start: 2023-02-05 | End: 2023-02-05 | Stop reason: HOSPADM

## 2023-02-05 RX ORDER — SODIUM CHLORIDE 0.9 % (FLUSH) 0.9 %
10 SYRINGE (ML) INJECTION AS NEEDED
Status: DISCONTINUED | OUTPATIENT
Start: 2023-02-05 | End: 2023-02-05 | Stop reason: HOSPADM

## 2023-02-05 RX ORDER — DIPHENOXYLATE HYDROCHLORIDE AND ATROPINE SULFATE 2.5; .025 MG/1; MG/1
2 TABLET ORAL 4 TIMES DAILY PRN
Status: DISCONTINUED | OUTPATIENT
Start: 2023-02-05 | End: 2023-02-05 | Stop reason: HOSPADM

## 2023-02-05 RX ORDER — CETIRIZINE HYDROCHLORIDE 10 MG/1
10 TABLET ORAL DAILY
Status: DISCONTINUED | OUTPATIENT
Start: 2023-02-05 | End: 2023-02-05 | Stop reason: HOSPADM

## 2023-02-05 RX ORDER — ERGOCALCIFEROL 1.25 MG/1
50000 CAPSULE ORAL
Qty: 8 CAPSULE | Refills: 0 | Status: SHIPPED | OUTPATIENT
Start: 2023-02-05 | End: 2023-03-27

## 2023-02-05 RX ORDER — ACETAMINOPHEN 160 MG/5ML
650 SOLUTION ORAL EVERY 4 HOURS PRN
Status: DISCONTINUED | OUTPATIENT
Start: 2023-02-05 | End: 2023-02-05 | Stop reason: HOSPADM

## 2023-02-05 RX ORDER — FAMOTIDINE 20 MG/1
20 TABLET, FILM COATED ORAL NIGHTLY
Status: DISCONTINUED | OUTPATIENT
Start: 2023-02-05 | End: 2023-02-05 | Stop reason: HOSPADM

## 2023-02-05 RX ORDER — NORTRIPTYLINE HYDROCHLORIDE 25 MG/1
25 CAPSULE ORAL NIGHTLY
Status: DISCONTINUED | OUTPATIENT
Start: 2023-02-05 | End: 2023-02-05 | Stop reason: HOSPADM

## 2023-02-05 RX ORDER — ERGOCALCIFEROL 1.25 MG/1
50000 CAPSULE ORAL
Status: DISCONTINUED | OUTPATIENT
Start: 2023-02-05 | End: 2023-02-05 | Stop reason: HOSPADM

## 2023-02-05 RX ORDER — SODIUM CHLORIDE 9 MG/ML
40 INJECTION, SOLUTION INTRAVENOUS AS NEEDED
Status: DISCONTINUED | OUTPATIENT
Start: 2023-02-05 | End: 2023-02-05 | Stop reason: HOSPADM

## 2023-02-05 RX ORDER — NITROGLYCERIN 0.4 MG/1
0.4 TABLET SUBLINGUAL
Status: DISCONTINUED | OUTPATIENT
Start: 2023-02-05 | End: 2023-02-05 | Stop reason: HOSPADM

## 2023-02-05 RX ORDER — CHOLECALCIFEROL (VITAMIN D3) 125 MCG
500 CAPSULE ORAL
Status: DISCONTINUED | OUTPATIENT
Start: 2023-02-05 | End: 2023-02-05 | Stop reason: HOSPADM

## 2023-02-05 RX ORDER — ONDANSETRON 2 MG/ML
4 INJECTION INTRAMUSCULAR; INTRAVENOUS EVERY 6 HOURS PRN
Status: DISCONTINUED | OUTPATIENT
Start: 2023-02-05 | End: 2023-02-05 | Stop reason: HOSPADM

## 2023-02-05 RX ORDER — ENOXAPARIN SODIUM 100 MG/ML
1 INJECTION SUBCUTANEOUS EVERY 12 HOURS
Status: DISCONTINUED | OUTPATIENT
Start: 2023-02-05 | End: 2023-02-05 | Stop reason: HOSPADM

## 2023-02-05 RX ORDER — METOPROLOL SUCCINATE 25 MG/1
12.5 TABLET, EXTENDED RELEASE ORAL NIGHTLY
Status: DISCONTINUED | OUTPATIENT
Start: 2023-02-05 | End: 2023-02-05 | Stop reason: HOSPADM

## 2023-02-05 RX ORDER — HEPARIN SODIUM 5000 [USP'U]/ML
40-80 INJECTION, SOLUTION INTRAVENOUS; SUBCUTANEOUS EVERY 6 HOURS PRN
Status: DISCONTINUED | OUTPATIENT
Start: 2023-02-05 | End: 2023-02-05

## 2023-02-05 RX ORDER — ACETAMINOPHEN 650 MG/1
650 SUPPOSITORY RECTAL EVERY 4 HOURS PRN
Status: DISCONTINUED | OUTPATIENT
Start: 2023-02-05 | End: 2023-02-05 | Stop reason: HOSPADM

## 2023-02-05 RX ADMIN — HEPARIN SODIUM 16.6 UNITS/KG/HR: 10000 INJECTION, SOLUTION INTRAVENOUS at 03:48

## 2023-02-05 RX ADMIN — LORAZEPAM 0.5 MG: 2 INJECTION INTRAMUSCULAR; INTRAVENOUS at 03:42

## 2023-02-05 RX ADMIN — HEPARIN SODIUM 7200 UNITS: 5000 INJECTION INTRAVENOUS; SUBCUTANEOUS at 03:42

## 2023-02-05 RX ADMIN — IOPAMIDOL 95 ML: 755 INJECTION, SOLUTION INTRAVENOUS at 13:04

## 2023-02-05 RX ADMIN — SODIUM CHLORIDE 1000 ML: 9 INJECTION, SOLUTION INTRAVENOUS at 04:57

## 2023-02-05 NOTE — PROGRESS NOTES
The preliminary findings of today's left upper extremity venous duplex are positive for acute deep and superficial vein thrombosis of the left upper arm. These preliminary findings were called to Sheba SYED RN on 6 east.

## 2023-02-05 NOTE — ED NOTES
Nursing report ED to floor  Carole Weaver  42 y.o.  female    HPI :   Chief Complaint   Patient presents with    neck swelling       Admitting doctor:   Lito Wu MD    Admitting diagnosis:   The primary encounter diagnosis was Neck pain on left side. Diagnoses of History of DVT (deep vein thrombosis), Factor V Leiden (HCC), and History of colon cancer were also pertinent to this visit.    Code status:   Current Code Status       Date Active Code Status Order ID Comments User Context       2/5/2023 0458 CPR (Attempt to Resuscitate) [id number]  Patience Hernandez, APRN ED        Question Answer    Code Status (Patient has no pulse and is not breathing) CPR (Attempt to Resuscitate)    Medical Interventions (Patient has pulse or is breathing) Full Support                    Allergies:   Patient has no known allergies.    Isolation:   No active isolations    Intake and Output  No intake or output data in the 24 hours ending 02/05/23 0548    Weight:       02/05/23  0016   Weight: 90.3 kg (199 lb)       Most recent vitals:   Vitals:    02/05/23 0221 02/05/23 0300 02/05/23 0445 02/05/23 0515   BP:  144/90  (!) 144/105   BP Location:  Left arm     Patient Position:  Lying     Pulse: 77 82 75 87   Resp:  16     Temp:       TempSrc:       SpO2: 99% 95% 93% 92%   Weight:       Height:           Active LDAs/IV Access:   Lines, Drains & Airways       Active LDAs       Name Placement date Placement time Site Days    Peripheral IV 02/05/23 0333 Right Antecubital 02/05/23 0333  Antecubital  less than 1    Single Lumen Implantable Port 12/18/20 Right Chest 12/18/20  1200  Chest  778                    Labs (abnormal labs have a star):   Labs Reviewed   PROTIME-INR - Normal   APTT - Normal   CBC WITH AUTO DIFFERENTIAL - Normal   COMPREHENSIVE METABOLIC PANEL    Narrative:     GFR Normal >60  Chronic Kidney Disease <60  Kidney Failure <15     BASIC METABOLIC PANEL   CBC WITH AUTO DIFFERENTIAL   CBC AND DIFFERENTIAL     Narrative:     The following orders were created for panel order CBC & Differential.  Procedure                               Abnormality         Status                     ---------                               -----------         ------                     CBC Auto Differential[[id number]]        Normal              Final result                 Please view results for these tests on the individual orders.       EKG:   No orders to display       Meds given in ED:   Medications   sodium chloride 0.9 % flush 10 mL (has no administration in time range)   heparin 03131 units/250 mL (100 units/mL) in 0.45 % NaCl infusion (16.6 Units/kg/hr × 90.3 kg Intravenous New Bag 2/5/23 0348)   heparin (porcine) 5000 UNIT/ML injection 3,600-7,200 Units (has no administration in time range)   sodium chloride 0.9 % flush 10 mL (has no administration in time range)   sodium chloride 0.9 % flush 10 mL (has no administration in time range)   sodium chloride 0.9 % infusion 40 mL (has no administration in time range)   nitroglycerin (NITROSTAT) SL tablet 0.4 mg (has no administration in time range)   acetaminophen (TYLENOL) tablet 650 mg (has no administration in time range)     Or   acetaminophen (TYLENOL) 160 MG/5ML solution 650 mg (has no administration in time range)     Or   acetaminophen (TYLENOL) suppository 650 mg (has no administration in time range)   ondansetron (ZOFRAN) injection 4 mg (has no administration in time range)   sodium chloride 0.9 % bolus 1,000 mL (1,000 mL Intravenous New Bag 2/5/23 6055)   LORazepam (ATIVAN) injection 0.5 mg (0.5 mg Intravenous Given 2/5/23 0342)   heparin (porcine) 5000 UNIT/ML injection 7,200 Units (7,200 Units Intravenous Given 2/5/23 0342)       Imaging results:  No radiology results for the last day    Ambulatory status:   - up ad dmitri    Social issues:   Social History     Socioeconomic History    Marital status:      Spouse name: Justus    Number of children: 1    Years of  education: College   Tobacco Use    Smoking status: Every Day     Packs/day: 1.00     Years: 25.00     Pack years: 25.00     Types: Electronic Cigarette, Cigarettes     Passive exposure: Current    Smokeless tobacco: Never    Tobacco comments:     1 PPD   Vaping Use    Vaping Use: Some days    Substances: Nicotine    Devices: Disposable    Passive vaping exposure: Yes   Substance and Sexual Activity    Alcohol use: Not Currently     Comment: RARELY    Drug use: Never    Sexual activity: Yes     Partners: Male     Comment: .       NIH Stroke Scale:         Keily Archibald RN  02/05/23 05:48 EST

## 2023-02-05 NOTE — CONSULTS
Subjective     REASON FOR CONSULTATION:    Evaluation for metastatic rectal cancer and factor V Leiden mutation                             REQUESTING PHYSICIAN:  MD Haley    RECORDS OBTAINED:  Records of the patients history including those obtained from the referring provider were reviewed and summarized in detail.    HISTORY OF PRESENT ILLNESS:  The patient is a 42 y.o. 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  Patient was concerned she may have developed a blood clot.  Patient reports she had been taking Lovenox just once a day.    Hematology/oncology consulted for further evaluation and management.  Patient is well-known to our service and follows up with  for rectal cancer and DVT/PE management.    Oncologic history:  Metastatic rectal cancer.   • Initial rectal ultrasound examination reported T3N0 disease without lymphadenopathy.   • CT scan of chest, abdomen and pelvis reported no lymphadenopathy in the abdomen, pelvis or chest. She does have small pulmonary nodule 6-7 mm and 2 mm with indeterminate feature. There is no solid evidence of metastatic disease otherwise.   • Based on the CT scan and rectal ultrasound imaging studies, this patient had stage IIA (T3N0M0) disease.   • She had PET scan examination on 8/8/2019 which reported a hypermetabolic right upper lobe pulmonary nodule 6 mm with SUV 5.6.  This is suspicious for metastatic disease however it is too small to be biopsied.  This patient may have stage IV disease.   • She initiated concurrent radiation with continuous 5-FU on 8/12/2019.  • Patient finished concurrent  chemoradiation therapy.  • Patient underwent surgical resection of the rectal tumor and diverting loop ileostomy on 12/2/2019 with Dr. Ye.  Pathology evaluation reported residual T3 disease, with 1 out of 14 lymph nodes positive for malignancy.  Certainly this patient has at least stage IIIb rectal cancer (T3 N1 M0?)  • On 1/22/2020 adjuvant chemotherapy FOLFOX 6 regimen initiated.   • On 2/5/2022 cycle #2 was delayed secondary to neutropenia.  She was given 3 days of Granix.   • Emend added with cycle 3.  With improved nausea control  • Continuing home Granix x3 days following 5-FU disconnect  • 3/11/2020 due for cycle 4, however, she is experiencing progressive abdominal pain and occasional fevers.   • CT scan performed on 3/13/2020 reveals no evidence of progressive or recurrent disease.  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  • Patient hospitalized 4/24-4/26/20 after cycle 5 of chemotherapy with acute UTI.  CT abdomen/pelvis noting fluid collection in the presacral region having diminished in size compared to CT dated 3/13/2020.  Patient was evaluated by Dr. Ye while in the hospital with further plans to evaluate possible colovaginal fistula following completion of chemotherapy.  Patient did respond to IV antibiotics and discharged home on oral cefdinir.  • 4/29/2020 cycle 6 of FOLFOX.  Urinary symptoms have resolved   • Patient seen in the [facility] ED on 5/2/2020 for what was suspected to be an allergic reaction.  She called our service on Saturday explaining that she was experiencing hand and face swelling.  She was instructed to proceed to the emergency department at which time she was assessed and prescribed a Medrol Dosepak.  She had just completed her 5-FU and was unhooked on Friday, 5/1/2020.  Her symptoms have resolved in the office on assessment, 5/5/2020.  • The patient had grade 3 hand-foot syndrome based on symptomology.  Patient had cycle #8 of 5-FU on 5/13/2020. Due to her symptoms and  poor tolerance to the 5-FU, her treatment dose will be reduced 50% for oxaliplatin and infusional 5-FU.  Bolus 5-FU will be discontinued..  • On 5/21/2020 patient had a PET scan and it showed no evidence of of metastatic disease in the neck, chest, abdomen or pelvis.  There was fluid accumulation/abscess.   • On 5/27/2020 discussed with the patient that we can discontinue chemotherapy at this time.  She will follow-up with Dr. Ye to discuss a possibility to reverse the ileostomy.  We likely will obtain anther PET scan in 3 to 4 months for reassessment.    • Patient was seen by Dr. Ye on 6/19/2019 for evaluation and to discuss possible take down of her ileostomy.  She is scheduled to have a gastrografin enema on 7/2/2020 to evaluate for a fistula, and then a colonoscopy on 7/15/2020, both done by Dr. Ye.  She states that based on the above imaging and procedure findings, she may have a more extensive surgery done or just have her ileostomy reversed, which would likely be done in August 2020.  This will all be coordinated under the care of Dr. Ye.     • CT scan from 09/09/2020 and also compared to her previous PET scan examination from 05/21/2020 and also the original PET scan from 08/09/2019.  The original PET scan there is a small right upper lobe pulmonary nodule 6 mm with SUV 5.6. So in the 05/2020 PET scan the nodule is still there but activity seems much less and this most recent CT scan examination also documented the preexisting small pulmonary nodule however there is a new left lower lobe pulmonary nodule 8 mm in size and I shared with the patient today this nodule is suspicious for metastatic disease. Laboratory studies reported minimal CEA level 1.32 on 09/09/2020. Liver function panel was only marginally abnormal with the ALT 45, the remaining is normal. However laboratory study today showed worsening AST 55, ALT 95 and alkaline phosphatase 143 but is still normal, total bilirubin 0.3.    • Recommended to have repeat PET scan examination for assessment because the left lower lobe pulmonary nodule is too small to be biopsied, and if the PET scan reports hypermetabolic lesion, I recommended to have stereotactic radiation therapy. Explained to patient that she is not a really good candidate to have thoracotomy for resection because she still has unhealed wound in the abdomen. Stereotactic radiation therapy will likely be a more feasible choice.   • PET scan examination obtained on 9/18/2020 showed metastatic disease.  It reported a few hypermetabolic pulmonary nodules, and some new small pulmonary nodules, all of them highly suspicious for metastatic disease.  She also has hypermetabolic activity in the abdomen and pelvic area which could be related to scar tissue from her recent surgery versus metastatic disease.  Nevertheless overall picture fits with metastatic cancer.  • Discussed with the patient on 9/20/2020, we reviewed the PET scan images together, and I recommended to have systematic chemotherapy, I do not think stereotactic reading therapy to the hypermetabolic lesions in the lungs warranted at this time because even those will be treated with reading therapy, she will still need systematic chemotherapy.  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  I did discuss with the patient using anti-EGFR monoclonal antibody versus anti-VEGF monoclonal antibody.     • Palliative chemotherapy cycle#1 FOLFIRI was started on 10/13/2020.  No bolus 5-FU given considering previous poor tolerance.    • NGS study from [facility] reported positive for [place]-s[place] mutation.  Microsatellite stable, mutation burden 5/Mb.   • Discussed with the patient 10/27/2020, because of the K-s[place] mutation, she is not a candidate for anti-EGFR monoclonal antibody such as Erbitux or vectibix.  She could be a candidate for anti-VEGF monoclonal antibody, however because of active wound, she  is not a candidate right now at this moment.  • Patient seen in the ED for acute right neck pain on 11/16/2020.  CT angiogram as well as CT of the cervical spine performed with no acute findings but notably one of her pulmonary nodules, left upper lobe, somewhat decreased in size.  Patient given prednisone pack.  Further details below.  • Cycle #6 chemotherapy will be resumed on 1/5/2021.  Started back on original irinotecan.  • PET scan examination obtained on 1/12/2021 after cycle #6 chemotherapy reported improved pulmonary nodule hypermetabolic activity.  Confirmed these are metastatic lesions.  • Patient is evaluated 1/19/2020, will continue cycle #7 FOLFIRI chemotherapy.  However Avastin will be on hold see next section.   • Patient was reevaluated on 2/2/2021, will continue chemotherapy cycle #8 FOLFIRI.  Avastin / biosimilar will be added.    • She talked to [facility] specialist in mid February 2021, and had recommended palliative chemotherapy without surgical intervention of metastatic lung lesions.   • On 3/2/2021, patient will proceed with cycle #10 FOLFIRI plus bevacizumab.  After 12 cycles, plan to start maintenance treatment.  • 3/16/2021 cycle 11.  Plus bevacizumab.  • 3/30/2021 cycle 12 FOLFIRI plus bevacizumab.  Having issues with worsening nausea despite premeds with dexamethasone, Aloxi, Emend, and Zofran at home.  Patient is requesting a dose of Phenergan, which is helped her in the past.  We will give this to her with treatment today.  • PET scan examination on 4/6/2021 reported no evidence of hypermetabolic metastatic lesion.  • Discussed with the patient on 4/13/2021, we reviewed the PET scan results.  We recommended to switch to maintenance chemotherapy without irinotecan.  We will continue 5-FU and Avastin every 2 weeks.  We discussed possibility of switching to oral Xeloda treatment.  Discussed with the patient for side effects more so with hand-foot syndrome.   Patient is agreeable.   • Xeloda 2000 mg twice daily 7 days on, 7 days off along with Avastin initiated 4/27/2021.  • After only 5 doses of Xeloda significant hand-foot syndrome, Xeloda held. Patient requesting to be transitioned back to infusional 5-FU, as she felt that she tolerated infusional 5-FU without any problem.  The patient will receive 5-FU leucovorin and Avastin every 2 weeks.  Xeloda discontinued.  • 5/25/2021 continued cycle #4 maintenance 5-FU, leucovorin, Avastin tolerating this much better so far, we will continue this. Recommended to have 12 cycles of maintenance chemotherapy, then obtain PET scan for reevaluation.  We could discuss further treatment plan at that time.  • 9/14/2021 patient due for cycle 12 of additional maintenance 5-FU/leucovorin plus Avastin.  She continues to tolerate treatment well overall.  She is anxious in the office today with her Mediport not getting blood at first and also with upcoming scans being heavy on her mind.  She was instructed to take one of her Klonopin pills while here to help calm her mind.  Heart rate did improve from 140s down to 112.  Proceed with treatment today as scheduled.  • PET scan examination on 9/24/2021 reported further shrinking of the right upper lobe tiny pulmonary nodule.  No other new lesions.  • Discussed with patient on 9/24/2021 about further shrinking of the right upper lobe pulmonary nodule and no evidence for other new lesions. We recommended to have chemo break for 3 months with repeated CT scan for reevaluation.  • Recent CT scan for chest 12/14/2021 and abdomen CT from 11/29/2021 reported no evidence for active disease. The right upper lobe pulmonary nodule, left lower lobe pulmonary nodule minimal about 4 mm and stable. I discussed with patient today on 12/21/2021, recommended to give her another 3 months chemotherapy holiday and obtain CT scan afterwards for reevaluation. After discussion, patient is agreeable.   • CT scan  examination for chest abdomen and pelvis obtained on 3/15/2022 reported a slight increase of the left upper lobe pulmonary nodule 5 mm, from previous 3 mm.  The other pulmonary nodules were stable in size.  There is also small left upper lobe groundglass changes.   • I reviewed images studies with the patient today on 3/23/2022, compared to multiple previous images including CT chest CT and PET scan, suspected disease progression.  Discussed with the patient, and I recommended to resume maintenance chemotherapy with 5-FU plus leucovorin plus Avastin repeating every 2 weeks, and obtaining CT scan for reassessment in 3 months.  Patient is agreeable.  • Patient resumed maintenance chemotherapy on 3/29/2022 with 5-FU leucovorin and Avastin.   • 4/26/2022, proceed with cycle #27 maintenance 5-FU, leucovorin, and Avastin.  Patient continues to tolerate treatment well.    • On 5/10/2022, we will proceed ahead with cycle #28 maintenance chemotherapy.  Plan to have CT scan after cycle #30.  • 5/24/2022 cycle 29 maintenance chemotherapy.  Continue to tolerate well.  • 6/7/2022 cycle #30 maintenance chemotherapy, continuing to tolerate well.   • CT scan for chest abdomen pelvis with IV contrast obtained on 6/21/2022 reported sub-6 mm pulmonary nodules either stable or slightly smaller.  We reviewed the images with the patient together.  We discussed with the patient and recommended to hold chemotherapy for now with repeating CT scan in 3 months for reassessment.  • CT scan of the chest abdomen pelvis 9/13/2022 reported stable condition, no evidence for recurrent or metastatic colon cancer.  • On 1/10/2023 patient had a CT chest abdomen pelvis with reported no evidence for disease progression.  Laboratory study also showed normal CEA.         2.   Factor V Leiden heterozygosity with history of pulmonary embolism in September 2019 and chronic left innominate vein thrombosis along with acute right subclavian and SVC thrombus  12/21/2020  • Patient is known factor V Leiden heterozygote  • Patient had been receiving low-dose Lovenox 40 mg daily as prophylaxis due to presence of MediPort and underlying malignancy when she developed pulmonary emboli 9/20/2019.  Low-dose Lovenox discontinued and initiated Xarelto 20 mg daily.  • Patient with apparent understanding chronic thrombosis of left innominate vein likely associated with MediPort, was evident per vascular surgery from CT scan in September 2020.  • Patient held Xarelto for 2 days prior to MediPort removal from the left chest wall and placement of new port in the right chest wall on 12/18/2020.  She resumed Xarelto that evening.  • Progressive swelling in the neck, face, upper extremities prompting hospitalization with CT scan showing thrombosis in the right subclavian vein and SVC.  • Patient with port associated thrombosis in the setting of factor V Leiden heterozygosity and active metastatic malignancy.  Although she had been off of Xarelto for a few days, clearly Xarelto was not prohibiting progression of her thrombosis after she resumed treatment and there was some evidence to suggest thrombosis had been present at least in the innominate vein on prior scan in September but now appears chronic.  Current acute thrombosis involving SVC and right subclavian.  • Patient was admitted and placed on heparin drip  • Patient was evaluated by vascular surgery and intervention was not recommended for thrombolysis/thrombectomy.  • On 12/22/2020, the patient developed worsening shortness of breath, increasing upper extremity edema consistent with worsening SVC syndrome.  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  • Symptoms improved and patient was discharged 12/24/2020 on Lovenox 100 mg every 12 hours.    • Returns 12/29/2020 for evaluation continuing Lovenox, overall  tolerating it well.  No bleeding issues.  • Improved edema 1/5/2021.  Will continue Lovenox weight-based every 12 hours.  • On 1/19/2021 and 2/2/2021, patient reports improved facial swelling.  She however does have being bruise on her abdomen wall where she does Lovenox injection.  However she does not have a spontaneous epistaxis, gum bleeding or other bleeding.   • On 2/2/2021, we discussed that side effects of Avastin/biosimilar related to thrombosis.  Since this patient already has been on Lovenox, I think the benefits from treatment for her cancer will outweigh that risk of thrombosis, will proceed ahead with Avastin.  I cautioned patient to watch out for signs of worsening thrombosis patient voiced understanding.   • On 3/16/2021, no evidence of worsening DVT, continue Lovenox.  • 5/11/2021 Lovenox dosing will be adjusted due to patient's weight loss.  We discussed she needs 1 mg/kg.  Her syringes are 100 mg syringes she will do 0.9 mL subcu every 12 hours.  • 8/3/2021 Patient continues to have bruising on abdomen from lovenox injections.  Will need to monitor weight and adjust dosing in future if further weight loss.   • Stable condition on 9/28/2021.  Continue Lovenox anticoagulation.    • Patient reports no chest pain no dyspnea no leg edema. However she had bruising and also most recently abnormal small abscess for which she actually went to ER on 11/29/2021, had I&D. The wound is healing. No further drainage. Denies fever sweating or chills. After discussion, she will continue Lovenox injection for now.   • On 3/23/2022, patient reports good tolerance of Lovenox.  Nevertheless she still has small quantity of pus in the left lower abdomen abscess, she squeezes it every day to prevent it became worse.  She reports no fever sweating chills.  Recommended to start Augmentin 875 mg twice a day for 7 days.  She will continue Lovenox indefinitely.  • On 4/12/2022, patient reports resolution of the small abscess  at left lower abdominal wall.   • On 5/10/2022, patient continues on Lovenox indefinitely.  No easy bleeding or bruising.  • 6/23/2022 continuing on Lovenox 1 mg/kg at a dose of 100 mg (patient weight is 96.6 kg today) every 12 hours.  • I will 1/17/2023, patient reports good tolerance, Lovenox will be continued.         Past Medical History:   Diagnosis Date   • Abdominopelvic abscess (HCC) 08/12/2020    ADMITTED TO [facility]   • Abnormal Pap smear of cervix 02/02/1998    JULIUS I   • Abscess of abdominal wall 11/28/2012    SEEN AT [facility] ER   • Anemia in pregnancy    • Anxiety    • Bilateral epiphora 04/2020   • Bilateral hand swelling 05/02/2020    SEEN AT [facility] ER   • Cervical lymphadenitis 06/06/2012    SEEN AT [facility] ER   • Chemotherapy induced diarrhea 01/2021   • Chemotherapy induced neutropenia (HCC) 04/2020   • Chemotherapy-induced nausea 02/2020   • Chemotherapy-induced thrombocytopenia 05/2020   • Chronic anticoagulation    • Chronic diarrhea    • Colon polyps     FOLLOWED BY DR. 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 ALEXANDRU HUNT   • History of gestational diabetes    • History of pre-eclampsia 1998   • History of pre-eclampsia    • History of radiation therapy 2019    FOLLOWED BY DR. JAVON LEWIS   • History of TB skin testing 01/17/2009    TB Skin Test   • History of TB skin testing 1/17/2009    TB Skin  Test   • History of transfusion 2019   • HPV in female 1998   • Hypokalemia 2019   • Hypomagnesemia 2019   • Hyponatremia 2021   • IBS (irritable bowel syndrome)    • Infected insect bite of neck 2016    SEEN AT [facility]   • Kidney stones 2007    SEEN AT [facility] ER   • Low anterior resection syndrome 2022    FOLLOWED BY DR. PADMAJA ESPARZA   • Lump of right breast     SWOLLEN LYMPH NODE   • Lung cancer (HCC) 2020    METASTATIC LUNG CANCER   • Macrocytosis 2021   • Monopolar depression (HCC)    • On anticoagulant therapy    • Pain associated with surgical procedure 2020   • Palmar-plantar erythrodysesthesia 2021   • Perirectal abscess 2020   • Pulmonary embolism without acute cor pulmonale (HCC) 09/20/2019    X 3; ADMITTED TO [facility]   • Pulmonary nodule, right 2020   • Rectal cancer (HCC) 2019    STAGE IIA, INVASIVE MODERATELY DIFFERENTIATED ADENOCARCINOMA, CHEMO AND XRT FINISHED 2019   • Right shoulder pain 2020    SEEN AT [facility] ER   • Right ureteral stone 10/01/2019    SEEN AT [facility] ER   • SAB (spontaneous ) 1996     A2-1 INDUCED   • Sciatica    • Sepsis due to cellulitis (HCC) 2002    LEFT GREAT TOE, ADMITTED TO [facility]   • Sepsis due to cellulitis (HCC) 2002    LEFT GREAT TOE, ADMITTED TO [facility]   • Tachycardia 2020   • 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 JAMEY TALAVERA AT [facility]   • COLON RESECTION N/A 2019    Procedure: laparoscopic low anterior colon resection with mobilization of splenic flexure and diverting loop ileostomy: ERAS;  Surgeon: Padmaja Esparza MD;  Location: [facility];  Service: General   • COLON RESECTION N/A 2020    Procedure: LOW ANTERIOR COLON  RESECTION, COLOANAL ANASTOMOSIS, MOBILIZATION SPLENIC FLEXURE;  Surgeon: Padmaja Esparza MD;  Location: [facility] MAIN OR;  Service: General;  Laterality: N/A;   • COLONOSCOPY N/A 07/15/2020    PATENT ANASTAMOSIS IN MID RECTUM, RESCOPE IN 1 YR, DR. PADMAJA ESPARZA AT [facility]   • COLONOSCOPY N/A 11/03/2022    DIFFUSE AREA OF MODERATELY FRIABLE MUCOSA IN ENTIRE COLON , DIVERSION COLITIS, PATENT AND HEALTHY ANASTAMOSIS, DR. PADMAJA ESPARZA AT [facility]   • COLONOSCOPY W/ POLYPECTOMY N/A 07/08/2019    15 MM TUBULOVILLOUS ADENOMA POLYP IN HEPATIC FLEXURE, 20 MMTUBULOVILLOUS ADENOMA WITH HIGH GRADE DYSPLASIA IN RECTOSIGMOID, 4 CM MASS IN MID RECTUM, PATH: INVASIVE MODERATELY DIFFERENTIATED ADENOCARCINOMA, DR. JENNIFER LI AT [facility]   • DILATATION AND EVACUATION N/A 2009   • ENDOSCOPY N/A 07/08/2019    LA GRADE A ESOPHAGITIS, GASTRITIS, ALL BIOPSIES BENIGN, DR. JENNIFER LI AT [facility]   • ILEOSTOMY CLOSURE N/A 11/14/2022    Procedure: ILEOSTOMY TAKEDOWN;  Surgeon: Padmaja Esparza MD;  Location: [facility] MAIN OR;  Service: General;  Laterality: N/A;   • INCISION AND DRAINAGE PERIRECTAL ABSCESS N/A 08/14/2020    Procedure: INCISION AND DRAINAGE OF retrorectal dehiscence abcess with drain placement and irrigation;  Surgeon: Padmaja Esparza MD;  Location: [facility] MAIN OR;  Service: General;  Laterality: N/A;   • PAP SMEAR N/A 01/24/2014   • SIGMOIDOSCOPY N/A 07/24/2019    INFILTRATIVE PARTIALLY OBSTRUCTING LARGE RECTAL CANCER, AREA TATOOED, DR. PADMAJA ESPARZA AT [facility]   • SIGMOIDOSCOPY N/A 11/23/2019    AN ULCERATED PARTIALLY OBSTRUCTING MASS IN MID RECTUM, AREA TATTOOED, DR. PADMAJA ESPARZA AT [facility]   • SIGMOIDOSCOPY N/A 07/22/2021    PATENT ANASTAMOSIS IN DISTAL RECTUM, RESCOPE IN 1 YR, DR. PADMAJA ESPARZA AT [facility]   • TRANSRECTAL ULTRASOUND N/A 07/24/2019    Procedure: ULTRASOUND TRANSRECTAL;  Surgeon: Padmaja Espazra MD;  Location: [facility] ENDOSCOPY;  Service: General   • URETEROSCOPY LASER LITHOTRIPSY WITH STENT INSERTION  Right 10/30/2019    DR. ESTUARDO BEASLEY AT [facility]   • VAGINAL DELIVERY N/A 09/18/1998   • VENOUS ACCESS DEVICE (PORT) INSERTION Left 08/09/2019    Procedure: INSERTION VENOUS ACCESS DEVICE;  Surgeon: Sj Joseph MD;  Location: [facility] OR [facility] [place];  Service: General   • VENOUS ACCESS DEVICE (PORT) INSERTION N/A 12/18/2020    Procedure: INSERTION VENOUS ACCESS DEVICE right side, removal venous access device left side;  Surgeon: Sj Joseph MD;  Location: [facility] OR [facility] [place];  Service: General;  Laterality: N/A;   • WISDOM TOOTH EXTRACTION Bilateral 1993        No current facility-administered medications on file prior to encounter.     Current Outpatient Medications on File Prior to Encounter   Medication Sig Dispense Refill   • clonazePAM (KlonoPIN) 1 MG tablet Take 1 tablet by mouth 3 (Three) Times a Day As Needed for Anxiety. 90 tablet 0   • Cyanocobalamin (VITAMIN B-12 PO) Take 1 tablet by mouth Every Other Day.     • dicyclomine (BENTYL) 20 MG tablet TAKE 1 TABLET BY MOUTH EVERY 6 HOURS AS NEEDED (Patient taking differently: Take 20 mg by mouth Every 6 (Six) Hours As Needed.) 360 tablet 0   • diphenoxylate-atropine (LOMOTIL) 2.5-0.025 MG per tablet Take 2 tablets by mouth 3 (Three) Times a Day With Meals.     • Enoxaparin Sodium (LOVENOX) 100 MG/ML solution prefilled syringe syringe Inject 1 mL under the skin into the appropriate area as directed Every 12 (Twelve) Hours. 60 mL 2   • escitalopram (Lexapro) 10 MG tablet Take 1 tablet by mouth Daily. 90 tablet 1   • Estradiol (Imvexxy Maintenance Pack) 10 MCG insert Insert 10 mcg into the vagina 2 (Two) Times a Week.     • famotidine (PEPCID) 20 MG tablet TAKE 1 TABLET BY MOUTH TWICE A DAY (Patient taking differently: Take 20 mg by mouth Every Night.) 180 tablet 1   • Imvexxy Maintenance Pack 10 MCG insert      • Loratadine 10 MG capsule Take 10 mg by mouth Every Evening.     • metoprolol succinate XL (TOPROL-XL) 25 MG 24 hr tablet TAKE 0.5 TABLETS BY MOUTH EVERY  NIGHT 45 tablet 3   • nortriptyline (PAMELOR) 25 MG capsule Take 25 mg by mouth Every Night.     • ondansetron (ZOFRAN) 8 MG tablet TAKE 1 TABLET BY MOUTH THREE TIMES A DAY AS NEEDED FOR NAUSEA AND VOMITING 60 tablet 1   • promethazine (PHENERGAN) 25 MG tablet Take 1 tablet by mouth Every 6 (Six) Hours As Needed for Nausea or Vomiting. 60 tablet 2        ALLERGIES:  No Known Allergies     Social History     Socioeconomic History   • Marital status:      Spouse name: Justus   • Number of children: 1   • Years of education: College   Tobacco Use   • Smoking status: Every Day     Packs/day: 1.00     Years: 25.00     Pack years: 25.00     Types: Electronic Cigarette, Cigarettes     Passive exposure: Current   • Smokeless tobacco: Never   • Tobacco comments:     1 PPD   Vaping Use   • Vaping Use: Some days   • Substances: Nicotine   • Devices: Disposable   • Passive vaping exposure: Yes   Substance and Sexual Activity   • Alcohol use: Not Currently     Comment: RARELY   • Drug use: Never   • Sexual activity: Yes     Partners: Male     Comment: .        Family History   Problem Relation Age of Onset   • Hyperlipidemia Mother    • Colon polyps Mother    • Heart disease Mother    • Hypertension Mother    • Factor V Leiden deficiency Mother    • Skin cancer Father         Squamous in 60s   • Atrial fibrillation Father    • Drug abuse Sister    • Mental illness Sister         Addiction   • No Known Problems Son    • Colon cancer Maternal Uncle    • Cancer Paternal Uncle    • Colon cancer Paternal Uncle    • Diabetes Paternal Uncle    • Heart disease Paternal Grandfather    • Cancer Paternal Aunt         OVARIAN   • Malig Hyperthermia Neg Hx         Review of Systems   GENERAL: No change in appetite or weight;   No fevers, chills, sweats.    SKIN: No nonhealing lesions.   No rashes.  HEME/LYMPH: No easy bruising, bleeding.   No swollen nodes.   EYES: No vision changes or diplopia.   ENT: No tinnitus, hearing  loss, gum bleeding, epistaxis, hoarseness or dysphagia.  Left lower neck redness and tenderness.  RESPIRATORY: No cough, shortness of breath, hemoptysis or wheezing.   CVS: No chest pain, palpitations, orthopnea, dyspnea on exertion or PND.   GI: No melena or hematochezia.   No abdominal pain.  No nausea, vomiting, constipation, diarrhea  : No lower tract obstructive symptoms, dysuria or hematuria.   MUSCULOSKELETAL: No bone pain.  No joint stiffness.   NEUROLOGICAL: No global weakness, loss of consciousness or seizures.   PSYCHIATRIC: No increased nervousness, mood changes or depression.       Objective     Vitals:    02/05/23 0445 02/05/23 0515 02/05/23 0650 02/05/23 0708   BP:  (!) 144/105 134/84 124/94   BP Location:   Left arm Left arm   Patient Position:   Lying Lying   Pulse: 75 87 84 74   Resp:   16 18   Temp:    97.5 °F (36.4 °C)   TempSrc:    Oral   SpO2: 93% 92% 97% 95%   Weight:    91 kg (200 lb 9.6 oz)   Height:         Current Status 1/17/2023   ECOG score 0       Physical Exam   CONSTITUTIONAL:  Vital signs reviewed.  No distress, looks comfortable.  EYES:  Conjunctiva and lids unremarkable.    EARS,NOSE,MOUTH,THROAT:  Ears and nose appear unremarkable.   Left lower neck redness and tenderness.  RESPIRATORY:  Normal respiratory effort.  Lungs clear to auscultation bilaterally.  CARDIOVASCULAR:  Normal S1, S2.  No murmurs rubs or gallops.  No significant lower extremity edema.  GASTROINTESTINAL: Abdomen appears unremarkable.  Nondistended   LYMPHATIC:  No cervical, supraclavicular lymphadenopathy.  SKIN:  Warm.  No rashes.  PSYCHIATRIC:  Normal judgment and insight.  Normal mood and affect.  NEURO: AAOx3, no obvious focal deficits.      RECENT LABS:  Hematology WBC   Date Value Ref Range Status   02/05/2023 7.94 3.40 - 10.80 10*3/mm3 Final     RBC   Date Value Ref Range Status   02/05/2023 4.70 3.77 - 5.28 10*6/mm3 Final     Hemoglobin   Date Value Ref Range Status   02/05/2023 14.4 12.0 - 15.9 g/dL  Final     Hematocrit   Date Value Ref Range Status   02/05/2023 43.3 34.0 - 46.6 % Final     Platelets   Date Value Ref Range Status   02/05/2023 228 140 - 450 10*3/mm3 Final          Assessment & Plan   Ms. 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    #Metastatic rectal adenocarcinoma with pulmonary mets:  · Follows up with Dr. Nguyen.   · Patient is currently on treatment holiday.  Last treatment with 5-FU/Avastin in June 2022.  · Had stable disease on scans in January 2023.  Plan to continue active surveillance.    #Factor V Leiden heterozygous mutation with history of PE and SVC thrombus:  · Previously failed Xarelto.  · Patient is supposed to be on Lovenox 1 mg/kg twice daily.  However, patient states she had been taking it just once a day.  · Currently on heparin drip.  · Pending imaging findings, patient was advised to go back up on Lovenox 1mg/kg to twice a day.  Patient promised to be more compliant with Lovenox doses.    #Left-sided neck pain:  · US neck and CT angiogram neck ordered.  · Will follow the results    #Vitamin D deficiency: Started on replacement.    #Hypertension    Recommendations:  -Continue heparin drip for now.  -Await US neck and CTA neck findings.  -Will be advised to go back up on Lovenox 1 mg/kg twice daily dose.  Patient has promised to be more compliant.  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  Informed her that if US neck/CTA neck do not show any other acute findings, no  opposition to discharge from hematology/oncology's standpoint, as long as she takes her Lovenox 1 mg/kg twice daily.  We will plan to see her back in our clinic as scheduled.

## 2023-02-05 NOTE — H&P
Name: Carole Weaver ADMIT: 2023   : 1980  PCP: Deepika Vela III, NP-C    MRN: [id number] LOS: 0 days   AGE/SEX: 42 y.o. female  ROOM: [facility]/     Chief Complaint   Patient presents with   • neck swelling       Subjective   HPI  Ms. Weaver is a 42 y.o. female with a history of colon cancer factor V Leiden previous clot who presents to [facility] with left-sided neck pain and swelling.  She reports it has been ongoing for a few days and then got worse yesterday.  It was swollen red and tender to touch.  She did not notice any shortness of breath.  She is not any fevers or chills.  No recent sore throat.  No facial swelling.  She not having any cough.  No chest pain or palpitations.  No nausea vomiting diarrhea or abdominal pain.  She has not noticed any worsening swelling in her legs.  She does report that she has been missing her morning Lovenox shots and only taking them in the evening.  This has been happening due to her schedule and busyness.  She does not report any side effects from the Lovenox previously.  She follows with the [facility] and also with colorectal surgery and does have research participant listed in her chart.  She had been started on heparin drip in the ER and reports that her neck is less swollen and tender today.    Past Medical History:   Diagnosis Date   • Abdominopelvic abscess (HCC) 2020    ADMITTED TO [facility]   • Abnormal Pap smear of cervix 1998    JULIUS I   • Abscess of abdominal wall 2012    SEEN AT [facility] ER   • Anemia in pregnancy    • Anxiety    • Bilateral epiphora 2020   • Bilateral hand swelling 2020    SEEN AT [facility] ER   • Cervical lymphadenitis 2012    SEEN AT [facility] ER   • Chemotherapy induced diarrhea 2021   • Chemotherapy induced neutropenia (HCC) 2020   • Chemotherapy-induced nausea 2020   • Chemotherapy-induced thrombocytopenia 2020   • Chronic anticoagulation    • Chronic diarrhea    • Colon polyps      FOLLOWED BY DR. 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 ALEXANDRU HUNT   • History of gestational diabetes    • History of pre-eclampsia 1998   • History of pre-eclampsia    • History of radiation therapy 2019    FOLLOWED BY DR. 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 GERONIMO ESPARZA   • Lump of right breast     SWOLLEN LYMPH NODE   • Lung cancer (HCC) 09/28/2020    METASTATIC LUNG CANCER   • Macrocytosis 07/2021   • Monopolar depression (HCC)    • On anticoagulant therapy    • Pain associated with surgical procedure 01/29/2020   • 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   • Perirectal abscess 08/12/2020   • Pulmonary embolism without acute cor pulmonale (HCC) 09/20/2019    X 3; ADMITTED TO [facility]   • Pulmonary nodule, right 05/2020   • Rectal cancer (HCC) 07/08/2019    STAGE IIA, INVASIVE MODERATELY DIFFERENTIATED ADENOCARCINOMA, CHEMO AND XRT FINISHED 9/2019   • Right  shoulder pain 2020    SEEN AT [facility] ER   • Right ureteral stone 10/01/2019    SEEN AT [facility] ER   • SAB (spontaneous ) 1996     A2-1 INDUCED   • Sciatica    • Sepsis due to cellulitis (HCC) 2002    LEFT GREAT TOE, ADMITTED TO [facility]   • Sepsis due to cellulitis (HCC) 2002    LEFT GREAT TOE, ADMITTED TO [facility]   • Tachycardia 2020   • 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) 2020    AND BRACHIOCEPHALIC VEIN, ADMITTED TO [facility]   • Urinary urgency 2020     Past Surgical History:   Procedure Laterality Date   • ABDOMINAL SURGERY     • CHOLECYSTECTOMY N/A 10/10/2003    LAPAROSCOPIC WITH CHOLANGIOGRAM, DR. JAMEY TALAVERA AT [facility]   • COLON RESECTION N/A 2019    Procedure: laparoscopic low anterior colon resection with mobilization of splenic flexure and diverting loop ileostomy: ERAS;  Surgeon: Padmaja Esparza MD;  Location: [facility];  Service: General   • COLON RESECTION N/A 2020    Procedure: LOW ANTERIOR COLON RESECTION, COLOANAL ANASTOMOSIS, MOBILIZATION SPLENIC FLEXURE;  Surgeon: Padmaja Esparza MD;  Location: [facility] OR;  Service: General;  Laterality: N/A;   • COLONOSCOPY N/A 07/15/2020    PATENT ANASTAMOSIS IN MID RECTUM, RESCOPE IN 1 YR, DR. PADMAJA ESPARZA AT [facility]   • COLONOSCOPY N/A 2022    DIFFUSE AREA OF MODERATELY FRIABLE MUCOSA IN ENTIRE COLON , DIVERSION COLITIS, PATENT AND HEALTHY ANASTAMOSIS, DR. PADMAJA ESPARZA AT [facility]   • COLONOSCOPY W/ POLYPECTOMY N/A 2019    15 MM TUBULOVILLOUS ADENOMA POLYP IN HEPATIC FLEXURE, 20 MMTUBULOVILLOUS ADENOMA WITH HIGH GRADE DYSPLASIA IN RECTOSIGMOID, 4 CM MASS IN MID RECTUM, PATH: INVASIVE MODERATELY DIFFERENTIATED ADENOCARCINOMA, DR. JENNIFER LI AT [facility]   • DILATATION AND EVACUATION N/A    • ENDOSCOPY N/A 2019    LA GRADE A ESOPHAGITIS, GASTRITIS, ALL BIOPSIES BENIGN, DR. JENNIFER LI AT [facility]   • ILEOSTOMY CLOSURE N/A 2022    Procedure: ILEOSTOMY TAKEDOWN;   Surgeon: Padmaja Esparza MD;  Location: [facility] MAIN OR;  Service: General;  Laterality: N/A;   • INCISION AND DRAINAGE PERIRECTAL ABSCESS N/A 08/14/2020    Procedure: INCISION AND DRAINAGE OF retrorectal dehiscence abcess with drain placement and irrigation;  Surgeon: Padmaja Esparza MD;  Location: [facility] MAIN OR;  Service: General;  Laterality: N/A;   • PAP SMEAR N/A 01/24/2014   • SIGMOIDOSCOPY N/A 07/24/2019    INFILTRATIVE PARTIALLY OBSTRUCTING LARGE RECTAL CANCER, AREA TATOOED, DR. PADMAJA ESPARZA AT [facility]   • SIGMOIDOSCOPY N/A 11/23/2019    AN ULCERATED PARTIALLY OBSTRUCTING MASS IN MID RECTUM, AREA TATTOOED, DR. PADMAJA ESPARZA AT [facility]   • SIGMOIDOSCOPY N/A 07/22/2021    PATENT ANASTAMOSIS IN DISTAL RECTUM, RESCOPE IN 1 YR, DR. PADMAJA ESPARZA AT [facility]   • TRANSRECTAL ULTRASOUND N/A 07/24/2019    Procedure: ULTRASOUND TRANSRECTAL;  Surgeon: Padmaja Esparza MD;  Location: [facility] ENDOSCOPY;  Service: General   • URETEROSCOPY LASER LITHOTRIPSY WITH STENT INSERTION Right 10/30/2019    DR. ESTUARDO BEASLEY AT [place]   • VAGINAL DELIVERY N/A 09/18/1998   • VENOUS ACCESS DEVICE (PORT) INSERTION Left 08/09/2019    Procedure: INSERTION VENOUS ACCESS DEVICE;  Surgeon: Sj Joseph MD;  Location: [facility] OR [facility];  Service: General   • VENOUS ACCESS DEVICE (PORT) INSERTION N/A 12/18/2020    Procedure: INSERTION VENOUS ACCESS DEVICE right side, removal venous access device left side;  Surgeon: Sj Joseph MD;  Location: [facility] OR [facility];  Service: General;  Laterality: N/A;   • WISDOM TOOTH EXTRACTION Bilateral 1993     Family History   Problem Relation Age of Onset   • Hyperlipidemia Mother    • Colon polyps Mother    • Heart disease Mother    • Hypertension Mother    • Factor V Leiden deficiency Mother    • Skin cancer Father         Squamous in 60s   • Atrial fibrillation Father    • Drug abuse Sister    • Mental illness Sister         Addiction   • No Known Problems Son    • Colon cancer Maternal Uncle    • Cancer Paternal  Uncle    • Colon cancer Paternal Uncle    • Diabetes Paternal Uncle    • Heart disease Paternal Grandfather    • Cancer Paternal Aunt         OVARIAN   • Malig Hyperthermia Neg Hx      Social History     Tobacco Use   • Smoking status: Every Day     Packs/day: 1.00     Years: 25.00     Pack years: 25.00     Types: Electronic Cigarette, Cigarettes     Passive exposure: Current   • Smokeless tobacco: Never   • Tobacco comments:     1 PPD   Vaping Use   • Vaping Use: Some days   • Substances: Nicotine   • Devices: Disposable   • Passive vaping exposure: Yes   Substance Use Topics   • Alcohol use: Not Currently     Comment: RARELY   • Drug use: Never     Medications Prior to Admission   Medication Sig Dispense Refill Last Dose   • clonazePAM (KlonoPIN) 1 MG tablet Take 1 tablet by mouth 3 (Three) Times a Day As Needed for Anxiety. 90 tablet 0    • Cyanocobalamin (VITAMIN B-12 PO) Take 1 tablet by mouth Every Other Day.      • dicyclomine (BENTYL) 20 MG tablet TAKE 1 TABLET BY MOUTH EVERY 6 HOURS AS NEEDED (Patient taking differently: Take 20 mg by mouth Every 6 (Six) Hours As Needed.) 360 tablet 0    • diphenoxylate-atropine (LOMOTIL) 2.5-0.025 MG per tablet Take 2 tablets by mouth 3 (Three) Times a Day With Meals.      • Enoxaparin Sodium (LOVENOX) 100 MG/ML solution prefilled syringe syringe Inject 1 mL under the skin into the appropriate area as directed Every 12 (Twelve) Hours. 60 mL 2    • escitalopram (Lexapro) 10 MG tablet Take 1 tablet by mouth Daily. 90 tablet 1    • Estradiol (Imvexxy Maintenance Pack) 10 MCG insert Insert 10 mcg into the vagina 2 (Two) Times a Week.      • famotidine (PEPCID) 20 MG tablet TAKE 1 TABLET BY MOUTH TWICE A DAY (Patient taking differently: Take 20 mg by mouth Every Night.) 180 tablet 1    • Imvexxy Maintenance Pack 10 MCG insert       • Loratadine 10 MG capsule Take 10 mg by mouth Every Evening.      • metoprolol succinate XL (TOPROL-XL) 25 MG 24 hr tablet TAKE 0.5 TABLETS BY  MOUTH EVERY NIGHT 45 tablet 3    • nortriptyline (PAMELOR) 25 MG capsule Take 25 mg by mouth Every Night.      • ondansetron (ZOFRAN) 8 MG tablet TAKE 1 TABLET BY MOUTH THREE TIMES A DAY AS NEEDED FOR NAUSEA AND VOMITING 60 tablet 1    • promethazine (PHENERGAN) 25 MG tablet Take 1 tablet by mouth Every 6 (Six) Hours As Needed for Nausea or Vomiting. 60 tablet 2      Allergies:  No Known Allergies    Review of Systems   Constitutional: Negative for chills and fever.   HENT: Negative for sore throat and trouble swallowing.    Eyes: Negative for pain and visual disturbance.   Respiratory: Negative for cough and wheezing.    Cardiovascular: Negative for chest pain, palpitations and leg swelling.   Gastrointestinal: Negative for constipation, diarrhea, nausea and vomiting.   Endocrine: Negative for cold intolerance and heat intolerance.   Genitourinary: Negative for difficulty urinating and dysuria.   Musculoskeletal: Positive for neck pain. Negative for neck stiffness.   Skin: Negative for pallor and rash.   Allergic/Immunologic: Negative for environmental allergies and food allergies.   Neurological: Negative for seizures and syncope.   Hematological: Negative for adenopathy. Does not bruise/bleed easily.   Psychiatric/Behavioral: Negative for agitation and confusion.        Objective    Vital Signs  Temp:  [97.5 °F (36.4 °C)-98.8 °F (37.1 °C)] 97.5 °F (36.4 °C)  Heart Rate:  [] 74  Resp:  [16-18] 18  BP: (124-144)/() 124/94  SpO2:  [92 %-99 %] 95 %  on   ;   Device (Oxygen Therapy): room air  Body mass index is 32.38 kg/m².    Physical Exam  Vitals and nursing note reviewed.   Constitutional:       General: She is not in acute distress.     Appearance: She is not diaphoretic.   HENT:      Head: Normocephalic and atraumatic.   Eyes:      Conjunctiva/sclera: Conjunctivae normal.      Pupils: Pupils are equal, round, and reactive to light.   Cardiovascular:      Rate and Rhythm: Normal rate and regular  rhythm.      Pulses: Normal pulses.   Pulmonary:      Effort: Pulmonary effort is normal.      Breath sounds: No wheezing.   Abdominal:      General: There is no distension.      Palpations: Abdomen is soft.      Tenderness: There is no abdominal tenderness. There is no guarding or rebound.   Musculoskeletal:         General: No swelling or tenderness.      Cervical back: Neck supple.   Skin:     General: Skin is warm and dry.   Neurological:      Mental Status: She is alert.      Cranial Nerves: No cranial nerve deficit.   Psychiatric:         Mood and Affect: Mood normal.         Behavior: Behavior normal.         Results Review:  I reviewed the patient's new clinical results.  I reviewed imaging, agree with interpretation.  I reviewed EKG/telemetry, sinus on telemetry.  I reviewed prior records.    Lab Results (last 24 hours)     Procedure Component Value Units Date/Time    CBC & Differential [[id number]]  (Normal) Collected: 02/05/23 0342    Specimen: Blood Updated: 02/05/23 0418    Narrative:      The following orders were created for panel order CBC & Differential.  Procedure                               Abnormality         Status                     ---------                               -----------         ------                     CBC Auto Differential[[id number]]        Normal              Final result                 Please view results for these tests on the individual orders.    Comprehensive Metabolic Panel [[id number]] Collected: 02/05/23 0342    Specimen: Blood Updated: 02/05/23 0431     Glucose 87 mg/dL      BUN 7 mg/dL      Creatinine 0.72 mg/dL      Sodium 138 mmol/L      Potassium 3.7 mmol/L      Chloride 103 mmol/L      CO2 27.1 mmol/L      Calcium 9.2 mg/dL      Total Protein 7.0 g/dL      Albumin 3.8 g/dL      ALT (SGPT) 20 U/L      AST (SGOT) 16 U/L      Alkaline Phosphatase 95 U/L      Total Bilirubin 0.3 mg/dL      Globulin 3.2 gm/dL      A/G Ratio 1.2 g/dL      BUN/Creatinine Ratio 9.7      Anion Gap 7.9 mmol/L      eGFR 106.5 mL/min/1.73     Narrative:      GFR Normal >60  Chronic Kidney Disease <60  Kidney Failure <15      Protime-INR [[id number]]  (Normal) Collected: 02/05/23 0342    Specimen: Blood Updated: 02/05/23 0428     Protime 13.9 Seconds      INR 1.06    aPTT [[id number]]  (Normal) Collected: 02/05/23 0342    Specimen: Blood Updated: 02/05/23 0428     PTT 26.1 seconds     CBC Auto Differential [[id number]]  (Normal) Collected: 02/05/23 0342    Specimen: Blood Updated: 02/05/23 0418     WBC 7.94 10*3/mm3      RBC 4.70 10*6/mm3      Hemoglobin 14.4 g/dL      Hematocrit 43.3 %      MCV 92.1 fL      MCH 30.6 pg      MCHC 33.3 g/dL      RDW 12.4 %      RDW-SD 41.5 fl      MPV 9.5 fL      Platelets 228 10*3/mm3      Neutrophil % 67.4 %      Lymphocyte % 21.8 %      Monocyte % 6.7 %      Eosinophil % 3.5 %      Basophil % 0.3 %      Immature Grans % 0.3 %      Neutrophils, Absolute 5.36 10*3/mm3      Lymphocytes, Absolute 1.73 10*3/mm3      Monocytes, Absolute 0.53 10*3/mm3      Eosinophils, Absolute 0.28 10*3/mm3      Basophils, Absolute 0.02 10*3/mm3      Immature Grans, Absolute 0.02 10*3/mm3      nRBC 0.0 /100 WBC           US Head Neck Soft Tissue    (Results Pending)   CT Angiogram Neck    (Results Pending)     Assessment & Plan      Active Hospital Problems    Diagnosis  POA   • **Neck pain on left side [M54.2]  Yes   • GERD (gastroesophageal reflux disease) [K21.9]  Yes   • HTN (hypertension) [I10]  Yes   • Adenocarcinoma of rectum (HCC) [C20]  Yes   • Monopolar depression (HCC) [F33.9]  Yes      Resolved Hospital Problems   No resolved problems to display.     · Left-sided neck pain and swelling: Concern would be for DVT in the setting of active malignancy treatment.  There was some poor compliance with Lovenox reported as well.  She is improving on the heparin drip and does not have any infectious signs currently.  Plan to continue heparin and will obtain CTA neck and ultrasound.  Consult  hematology.  · Hypercoagulability  · Rectal cancer  · Hypertension: Resume home regimen  · Depression: Resume home regimen  · GERD: Famotidine  · Prophylaxis: Anticoagulation as above, per oncology  · Disposition: Home/when okay with oncology      I discussed the patients findings and my recommendations with patient.      Lito Wu MD  [facility]  02/05/23  10:04 EST    Dictated portions using Dragon dictation software.  During the entire encounter, I was wearing recommended PPE including face mask and eye protection. Hand sanitization was performed prior to entering room and upon exit.

## 2023-02-05 NOTE — OUTREACH NOTE
Prep Survey    Flowsheet Row Responses   [facility] patient discharged from? [place]   Is LACE score < 7 ? No   Eligibility [facility]   Date of Admission 02/05/23   Date of Discharge 02/05/23   Discharge Disposition Home or Self Care   Discharge diagnosis Neck pain on left side   Does the patient have one of the following disease processes/diagnoses(primary or secondary)? Other   Does the patient have Home health ordered? No   Is there a DME ordered? No   Prep survey completed? Yes          ADELA ORDOÑEZ - Registered Nurse

## 2023-02-05 NOTE — ED PROVIDER NOTES
MD ATTESTATION NOTE    The FLETCHER and I have discussed this patient's history, physical exam, and treatment plan.    I provided a substantive portion of the care of this patient. I personally performed the physical exam, in its entirety. The attached note describes my personal findings.      Carole Weaver is a 42 y.o. female who presents to the ED c/o left-sided neck pain.  States been hurting for the past 3 to 4 days was Pressly worsening swelling and redness.  Patient concerned he may have a clot in her neck.  Patient has history of metastatic rectal cancer and has not thrombus in the right subclavian.  Patient is supposed to be on Lovenox twice daily but states she has been only taking it once daily.      On exam:  GENERAL: not distressed  HENT: nares patent  EYES: no scleral icterus  CV: regular rhythm, regular rate  RESPIRATORY: normal effort  ABDOMEN: soft  MUSCULOSKELETAL: no deformity  NEURO: alert, moves all extremities, follows commands  SKIN: warm, dry mild erythema to the lateral left neck there is palpable cord    Labs  Recent Results (from the past 24 hour(s))   Comprehensive Metabolic Panel    Collection Time: 02/05/23  3:42 AM    Specimen: Blood   Result Value Ref Range    Glucose 87 65 - 99 mg/dL    BUN 7 6 - 20 mg/dL    Creatinine 0.72 0.57 - 1.00 mg/dL    Sodium 138 136 - 145 mmol/L    Potassium 3.7 3.5 - 5.2 mmol/L    Chloride 103 98 - 107 mmol/L    CO2 27.1 22.0 - 29.0 mmol/L    Calcium 9.2 8.6 - 10.5 mg/dL    Total Protein 7.0 6.0 - 8.5 g/dL    Albumin 3.8 3.5 - 5.2 g/dL    ALT (SGPT) 20 1 - 33 U/L    AST (SGOT) 16 1 - 32 U/L    Alkaline Phosphatase 95 39 - 117 U/L    Total Bilirubin 0.3 0.0 - 1.2 mg/dL    Globulin 3.2 gm/dL    A/G Ratio 1.2 g/dL    BUN/Creatinine Ratio 9.7 7.0 - 25.0    Anion Gap 7.9 5.0 - 15.0 mmol/L    eGFR 106.5 >60.0 mL/min/1.73   Protime-INR    Collection Time: 02/05/23  3:42 AM    Specimen: Blood   Result Value Ref Range    Protime 13.9 11.7 - 14.2 Seconds    INR 1.06 0.90  - 1.10   aPTT    Collection Time: 02/05/23  3:42 AM    Specimen: Blood   Result Value Ref Range    PTT 26.1 22.7 - 35.4 seconds   CBC Auto Differential    Collection Time: 02/05/23  3:42 AM    Specimen: Blood   Result Value Ref Range    WBC 7.94 3.40 - 10.80 10*3/mm3    RBC 4.70 3.77 - 5.28 10*6/mm3    Hemoglobin 14.4 12.0 - 15.9 g/dL    Hematocrit 43.3 34.0 - 46.6 %    MCV 92.1 79.0 - 97.0 fL    MCH 30.6 26.6 - 33.0 pg    MCHC 33.3 31.5 - 35.7 g/dL    RDW 12.4 12.3 - 15.4 %    RDW-SD 41.5 37.0 - 54.0 fl    MPV 9.5 6.0 - 12.0 fL    Platelets 228 140 - 450 10*3/mm3    Neutrophil % 67.4 42.7 - 76.0 %    Lymphocyte % 21.8 19.6 - 45.3 %    Monocyte % 6.7 5.0 - 12.0 %    Eosinophil % 3.5 0.3 - 6.2 %    Basophil % 0.3 0.0 - 1.5 %    Immature Grans % 0.3 0.0 - 0.5 %    Neutrophils, Absolute 5.36 1.70 - 7.00 10*3/mm3    Lymphocytes, Absolute 1.73 0.70 - 3.10 10*3/mm3    Monocytes, Absolute 0.53 0.10 - 0.90 10*3/mm3    Eosinophils, Absolute 0.28 0.00 - 0.40 10*3/mm3    Basophils, Absolute 0.02 0.00 - 0.20 10*3/mm3    Immature Grans, Absolute 0.02 0.00 - 0.05 10*3/mm3    nRBC 0.0 0.0 - 0.2 /100 WBC       Radiology  No Radiology Exams Resulted Within Past 24 Hours    Medical Decision Making:         PPE: Both the patient and I wore a surgical mask throughout the entire patient encounter.     Diagnosis  Final diagnoses:   Neck pain on left side   History of DVT (deep vein thrombosis)   Factor V Leiden (HCC)   History of colon cancer        Gonzalo Rogers MD  02/05/23 0816

## 2023-02-05 NOTE — ED PROVIDER NOTES
EMERGENCY DEPARTMENT ENCOUNTER    Room Number:  03/03  Date of encounter:  2/5/2023  PCP: Deepika Vela III, NP-C  Patient Care Team:  Deepika Vela III, NP-C as PCP - General (Family Medicine)  Padmaja Ye MD as Referring Physician (Colon and Rectal Surgery)  Beatrice Lau MD (Inactive) as Consulting Physician (Radiation Oncology)  Dong Nguyen MD PhD as Consulting Physician (Hematology and Oncology)  Wendy Cottrell MD as Consulting Physician (Obstetrics and Gynecology)  Tasha Bustos MD as Consulting Physician (Cardiology)   Independent Historians: Patient    HPI:  Chief Complaint: Left-sided neck pain  A complete HPI/ROS/PMH/PSH/SH/FH are unobtainable due to: N/A    Chronic or social conditions impacting patient care (social determinants of health): None    Context: Carole Weaver is a 42 y.o. female with past medical history of colon cancer, history of PE/DVT, factor V Leiden, and anxiety who arrives to the ED with complaint of left-sided neck pain.  Patient states that she started having tenderness and pain to the left side of her neck approximately 3 to 4 days ago and then yesterday and today has had progressively worsening swelling and redness.  Patient denies any headache, nausea or vomiting, shortness of breath, chest pain, difficulty swallowing or sore throat.  Patient states that there is a palpable tenderness and swelling to the left neck and patient is concerned about blood clots since she has been only giving herself her Lovenox shots once daily on average.    Review of prior external notes (non-ED): Oncology notes reviewed from Dr. Nguyen shows history of metastatic rectal cancer and thrombus in the right subclavian and SVC, has been on Lovenox twice daily.    Review of prior external test results outside of this encounter: Most recent CT scans in September 2022 were reported as stable and no evidence for recurrent or metastatic colon  cancer.    PAST MEDICAL HISTORY  Active Ambulatory Problems     Diagnosis Date Noted   • Anxiety 08/09/2016   • Irritable bowel syndrome 08/09/2016   • Monopolar depression (HCC) 05/09/2019   • Adenocarcinoma of rectum (HCC) 07/12/2019   • Family history of factor V Leiden mutation 08/01/2019   • Heterozygous factor V Leiden mutation (HCC) 08/02/2019   • Encounter for fitting and adjustment of vascular catheter 08/07/2019   • Functional diarrhea 09/03/2019   • Adverse effect of antineoplastic and immunosuppressive drugs, initial encounter 09/03/2019   • Hypokalemia 09/03/2019   • Hypomagnesemia 09/03/2019   • Other pulmonary embolism without acute cor pulmonale ([facility]) 09/20/2019   • Kidney stone on right side 10/07/2019   • Hydronephrosis with ureteropelvic junction (UPJ) obstruction 10/15/2019   • Partial intestinal obstruction, unspecified as to cause ([facility]) 07/08/2019   • Irregular heart beat 12/10/2019   • Hemorrhoids, external 12/10/2019   • Gastrointestinal hemorrhage, unspecified 07/08/2019   • Esophagitis 07/08/2019   • Family history of colonic polyps 12/10/2019   • Chronic diarrhea 12/10/2019   • Calculus of gallbladder 12/10/2019   • Benign neoplasm of transverse colon 07/08/2019   • Benign neoplasm of rectum and anal canal 07/08/2019   • Loss of weight 12/10/2019   • Heart murmur 12/10/2019   • Anemia 01/22/2020   • Anticoagulation adequate 01/22/2020   • Iron deficiency 02/05/2020   • Adverse effect of iron 02/05/2020   • Drug-induced peripheral neuropathy (HCC) 04/15/2020   • On antineoplastic chemotherapy 04/25/2020   • Chemotherapy-induced thrombocytopenia 04/25/2020   • HTN (hypertension) 04/25/2020   • Ileostomy present ([facility]) 04/25/2020   • Chronic anticoagulation 04/25/2020   • Chemotherapy-induced neutropenia ([facility]) 04/29/2020   • Hand foot syndrome 05/13/2020   • Pulmonary nodule, right 05/13/2020   • Abdominopelvic abscess ([facility]) 08/12/2020   • Perirectal abscess 08/12/2020   • Pulmonary nodules  2020   • Secondary cancer of lung (HCC) 10/05/2020   • Leukocytopenia 11/10/2020   • Thrombosis of superior vena cava (HCC) 2020   • Tachycardia 2020   • History of rectal cancer 2021   • Macrocytosis without anemia 2021   • Sciatica    • Right ureteral stone 10/01/2019   • Right shoulder pain 2020   • Pulmonary embolism without acute cor pulmonale (HCC) 2019   • Palmar-plantar erythrodysesthesia 2021   • On anticoagulant therapy    • Macrocytosis 2021   • Lump of right breast    • Kidney stones 2007   • Ileostomy in place ([facility])    • IBS (irritable bowel syndrome)    • HPV in female    • History of radiation therapy    • History of gestational diabetes    • History of chemotherapy    • History of anemia    • Hemorrhoids    • GERD (gastroesophageal reflux disease)    • Fistula of intestine    • Colon polyps    • Chemotherapy-induced nausea 2020   • Chemotherapy induced diarrhea 2021   • Cervical lymphadenitis 2012   • Bilateral hand swelling 2020   • Bilateral epiphora 2020   • Abnormal Pap smear of cervix 1998   • Family history of colon cancer 10/05/2022     Resolved Ambulatory Problems     Diagnosis Date Noted   • Immunization due 2018   • UTI (urinary tract infection) 2019   • Rectal cancer (HCC) 2019   • Hematochezia 12/10/2019   • Pain associated with surgical procedure 2020   • Rectal cancer (HCC) 2020   • Rectal cancer (HCC) 2020   • Hyponatremia 2020   • Malignant neoplasm of colon (HCC) 01/15/2021   • Dysuria 2021   • Urinary urgency 2020   • Sepsis due to cellulitis (HCC) 2002   • SAB (spontaneous ) 1996   • Rectal cancer (HCC) 2019   • Lung cancer ([facility]) 2020   • Infected insect bite of neck 2016   • History of TB skin testing 2009   • History of pre-eclampsia    • Factor V Leiden mutation ([facility])    • Depression    •  Cystitis 04/24/2020   • Cystitis 10/27/2020   • Chemotherapy induced neutropenia (HCC) 04/2020   • Anemia in pregnancy      Past Medical History:   Diagnosis Date   • Abscess of abdominal wall 11/28/2012   • Coronary artery calcification    • COVID-19 06/09/2022   • 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    PAST SURGICAL HISTORY  Past Surgical History:   Procedure Laterality Date   • ABDOMINAL SURGERY     • CHOLECYSTECTOMY N/A 10/10/2003    LAPAROSCOPIC WITH CHOLANGIOGRAM, DR. JAMEY TALAVERA AT [facility]   • COLON RESECTION N/A 12/02/2019    Procedure: laparoscopic low anterior colon resection with mobilization of splenic flexure and diverting loop ileostomy: ERAS;  Surgeon: Padmaja Esparza MD;  Location: [facility];  Service: General   • COLON RESECTION N/A 08/03/2020    Procedure: LOW ANTERIOR COLON RESECTION, COLOANAL ANASTOMOSIS, MOBILIZATION SPLENIC FLEXURE;  Surgeon: Padmaja Esparza MD;  Location: [facility] OR;  Service: General;  Laterality: N/A;   • COLONOSCOPY N/A 07/15/2020    PATENT ANASTAMOSIS IN MID RECTUM, RESCOPE IN 1 YR, DR. PADMAJA ESPARZA AT [facility]   • COLONOSCOPY N/A 11/03/2022    DIFFUSE AREA OF MODERATELY FRIABLE MUCOSA IN ENTIRE COLON , DIVERSION COLITIS, PATENT AND HEALTHY ANASTAMOSIS, DR. PADMAJA ESPARZA AT [facility]   • COLONOSCOPY W/ POLYPECTOMY N/A 07/08/2019    15 MM TUBULOVILLOUS ADENOMA POLYP IN HEPATIC FLEXURE, 20 MMTUBULOVILLOUS ADENOMA WITH HIGH GRADE DYSPLASIA IN RECTOSIGMOID, 4 CM MASS IN MID RECTUM, PATH: INVASIVE MODERATELY DIFFERENTIATED ADENOCARCINOMA, DR. JENNIFER LI AT [facility]   • DILATATION AND EVACUATION N/A 2009   • ENDOSCOPY N/A 07/08/2019    LA GRADE A ESOPHAGITIS, GASTRITIS, ALL BIOPSIES BENIGN, DR. JENNIFER LI AT [facility]   • ILEOSTOMY CLOSURE N/A 11/14/2022    Procedure:  ILEOSTOMY TAKEDOWN;  Surgeon: Padmaja Esparza MD;  Location: [facility] MAIN OR;  Service: General;  Laterality: N/A;   • INCISION AND DRAINAGE PERIRECTAL ABSCESS N/A 08/14/2020    Procedure: INCISION AND DRAINAGE OF retrorectal dehiscence abcess with drain placement and irrigation;  Surgeon: Padmaja Esparza MD;  Location: [facility] MAIN OR;  Service: General;  Laterality: N/A;   • PAP SMEAR N/A 01/24/2014   • SIGMOIDOSCOPY N/A 07/24/2019    INFILTRATIVE PARTIALLY OBSTRUCTING LARGE RECTAL CANCER, AREA TATOOED, DR. PADMAJA ESPARZA AT [facility]   • SIGMOIDOSCOPY N/A 11/23/2019    AN ULCERATED PARTIALLY OBSTRUCTING MASS IN MID RECTUM, AREA TATTOOED, DR. PADMAJA ESPARZA AT [facility]   • SIGMOIDOSCOPY N/A 07/22/2021    PATENT ANASTAMOSIS IN DISTAL RECTUM, RESCOPE IN 1 YR, DR. PADMAJA ESPARZA AT [facility]   • TRANSRECTAL ULTRASOUND N/A 07/24/2019    Procedure: ULTRASOUND TRANSRECTAL;  Surgeon: Padmaja Esparza MD;  Location: [facility] ENDOSCOPY;  Service: General   • URETEROSCOPY LASER LITHOTRIPSY WITH STENT INSERTION Right 10/30/2019    DR. ESTUARDO BEASLEY AT [facility]   • VAGINAL DELIVERY N/A 09/18/1998   • VENOUS ACCESS DEVICE (PORT) INSERTION Left 08/09/2019    Procedure: INSERTION VENOUS ACCESS DEVICE;  Surgeon: Sj Joseph MD;  [place] [facility] [place] [facility]  Service: General   • VENOUS ACCESS DEVICE (PORT) INSERTION N/A 12/18/2020    Procedure: INSERTION VENOUS ACCESS DEVICE right side, removal venous access device left side;  Surgeon: Sj Joseph MD;  [place] [facility] [place] [facility]  Service: General;  Laterality: N/A;   • WISDOM TOOTH EXTRACTION Bilateral 1993         FAMILY HISTORY  Family History   Problem Relation Age of Onset   • Hyperlipidemia Mother    • Colon polyps Mother    • Heart disease Mother    • Hypertension Mother    • Factor V Leiden deficiency Mother    • Skin cancer Father         Squamous in 60s   • Atrial fibrillation Father    • Drug abuse Sister    • Mental illness Sister         Addiction   • No Known Problems Son    • Colon  cancer Maternal Uncle    • Cancer Paternal Uncle    • Colon cancer Paternal Uncle    • Diabetes Paternal Uncle    • Heart disease Paternal Grandfather    • Cancer Paternal Aunt         OVARIAN   • Malig Hyperthermia Neg Hx          SOCIAL HISTORY  Social History     Socioeconomic History   • Marital status:      Spouse name: Justus   • Number of children: 1   • Years of education: College   Tobacco Use   • Smoking status: Every Day     Packs/day: 1.00     Years: 25.00     Pack years: 25.00     Types: Electronic Cigarette, Cigarettes     Passive exposure: Current   • Smokeless tobacco: Never   • Tobacco comments:     1 PPD   Vaping Use   • Vaping Use: Some days   • Substances: Nicotine   • Devices: Disposable   • Passive vaping exposure: Yes   Substance and Sexual Activity   • Alcohol use: Not Currently     Comment: RARELY   • Drug use: Never   • Sexual activity: Yes     Partners: Male     Comment: .         ALLERGIES  Patient has no known allergies.        REVIEW OF SYSTEMS  Review of Systems     All systems reviewed and negative except for those discussed in HPI.       PHYSICAL EXAM    I have reviewed the triage vital signs and nursing notes.    ED Triage Vitals [02/05/23 0016]   Temp Heart Rate Resp BP SpO2   98.8 °F (37.1 °C) 106 18 142/91 99 %      Temp src Heart Rate Source Patient Position BP Location FiO2 (%)   Tympanic Monitor Standing Right arm --       Physical Exam    GENERAL: alert and oriented x4, anxious, not distressed  HENT: normocephalic, atraumatic, moist mucous membranes  EYES: no scleral icterus, PERRL, EOMI  CV: regular rhythm, tachycardic  RESPIRATORY: normal effort, CTAB  ABDOMEN: soft/nontender  MUSCULOSKELETAL: no deformity  NEURO: alert, moves all extremities, no focal neuro deficits, follows commands  SKIN: warm, dry, there is mild erythema and warmth to the lateral aspect of the left neck with a palpable cord from the clavicle up to the angle of the mandible consistent with  thrombus  Psych: Appropriate mood and affect      Nursing notes and vital signs reviewed      LAB RESULTS  Recent Results (from the past 24 hour(s))   Comprehensive Metabolic Panel    Collection Time: 02/05/23  3:42 AM    Specimen: Blood   Result Value Ref Range    Glucose 87 65 - 99 mg/dL    BUN 7 6 - 20 mg/dL    Creatinine 0.72 0.57 - 1.00 mg/dL    Sodium 138 136 - 145 mmol/L    Potassium 3.7 3.5 - 5.2 mmol/L    Chloride 103 98 - 107 mmol/L    CO2 27.1 22.0 - 29.0 mmol/L    Calcium 9.2 8.6 - 10.5 mg/dL    Total Protein 7.0 6.0 - 8.5 g/dL    Albumin 3.8 3.5 - 5.2 g/dL    ALT (SGPT) 20 1 - 33 U/L    AST (SGOT) 16 1 - 32 U/L    Alkaline Phosphatase 95 39 - 117 U/L    Total Bilirubin 0.3 0.0 - 1.2 mg/dL    Globulin 3.2 gm/dL    A/G Ratio 1.2 g/dL    BUN/Creatinine Ratio 9.7 7.0 - 25.0    Anion Gap 7.9 5.0 - 15.0 mmol/L    eGFR 106.5 >60.0 mL/min/1.73   Protime-INR    Collection Time: 02/05/23  3:42 AM    Specimen: Blood   Result Value Ref Range    Protime 13.9 11.7 - 14.2 Seconds    INR 1.06 0.90 - 1.10   aPTT    Collection Time: 02/05/23  3:42 AM    Specimen: Blood   Result Value Ref Range    PTT 26.1 22.7 - 35.4 seconds   CBC Auto Differential    Collection Time: 02/05/23  3:42 AM    Specimen: Blood   Result Value Ref Range    WBC 7.94 3.40 - 10.80 10*3/mm3    RBC 4.70 3.77 - 5.28 10*6/mm3    Hemoglobin 14.4 12.0 - 15.9 g/dL    Hematocrit 43.3 34.0 - 46.6 %    MCV 92.1 79.0 - 97.0 fL    MCH 30.6 26.6 - 33.0 pg    MCHC 33.3 31.5 - 35.7 g/dL    RDW 12.4 12.3 - 15.4 %    RDW-SD 41.5 37.0 - 54.0 fl    MPV 9.5 6.0 - 12.0 fL    Platelets 228 140 - 450 10*3/mm3    Neutrophil % 67.4 42.7 - 76.0 %    Lymphocyte % 21.8 19.6 - 45.3 %    Monocyte % 6.7 5.0 - 12.0 %    Eosinophil % 3.5 0.3 - 6.2 %    Basophil % 0.3 0.0 - 1.5 %    Immature Grans % 0.3 0.0 - 0.5 %    Neutrophils, Absolute 5.36 1.70 - 7.00 10*3/mm3    Lymphocytes, Absolute 1.73 0.70 - 3.10 10*3/mm3    Monocytes, Absolute 0.53 0.10 - 0.90 10*3/mm3    Eosinophils,  Absolute 0.28 0.00 - 0.40 10*3/mm3    Basophils, Absolute 0.02 0.00 - 0.20 10*3/mm3    Immature Grans, Absolute 0.02 0.00 - 0.05 10*3/mm3    nRBC 0.0 0.0 - 0.2 /100 WBC       Ordered the above labs and independently reviewed and interpreted the results by me.        RADIOLOGY  No Radiology Exams Resulted Within Past 24 Hours    I ordered the above noted radiological studies.  These were independently interpreted and reviewed by me.  See dictation for official radiology interpretation.      PROCEDURES    Procedures      MEDICATIONS GIVEN IN ER    Medications   sodium chloride 0.9 % flush 10 mL (has no administration in time range)   sodium chloride 0.9 % bolus 1,000 mL (1,000 mL Intravenous New Bag 2/5/23 6638)   heparin 32403 units/250 mL (100 units/mL) in 0.45 % NaCl infusion (16.6 Units/kg/hr × 90.3 kg Intravenous New Bag 2/5/23 8978)   heparin (porcine) 5000 UNIT/ML injection 3,600-7,200 Units (has no administration in time range)   sodium chloride 0.9 % flush 10 mL (has no administration in time range)   sodium chloride 0.9 % flush 10 mL (has no administration in time range)   sodium chloride 0.9 % infusion 40 mL (has no administration in time range)   nitroglycerin (NITROSTAT) SL tablet 0.4 mg (has no administration in time range)   acetaminophen (TYLENOL) tablet 650 mg (has no administration in time range)     Or   acetaminophen (TYLENOL) 160 MG/5ML solution 650 mg (has no administration in time range)     Or   acetaminophen (TYLENOL) suppository 650 mg (has no administration in time range)   ondansetron (ZOFRAN) injection 4 mg (has no administration in time range)   LORazepam (ATIVAN) injection 0.5 mg (0.5 mg Intravenous Given 2/5/23 0342)   heparin (porcine) 5000 UNIT/ML injection 7,200 Units (7,200 Units Intravenous Given 2/5/23 0342)         PROGRESS, DATA ANALYSIS, CONSULTS, AND MEDICAL DECISION MAKING    All labs have been independently reviewed by me.  All radiology studies have been reviewed by me and  discussed with radiologist dictating the report.   EKG's independently viewed and interpreted by me.  Discussion below represents my analysis of pertinent findings related to patient's condition, differential diagnosis, treatment plan and final disposition.    DDx:  Includes, but is not limited to superficial thrombophlebitis, DVT         MDM: Patient's basic labs are unremarkable, but will start on heparin drip and admit for further evaluation with venous Doppler of the left neck, as well as for hematology consultation.    PPE:  The patient wore a mask and I wore a mask and all appropriate PPE throughout the entire patient encounter.      AS OF 05:02 EST VITALS:    BP - 132/98  HR - 77  TEMP - 98.8 °F (37.1 °C) (Tympanic)  O2 SATS - 99%      DIAGNOSIS  Final diagnoses:   Neck pain on left side   History of DVT (deep vein thrombosis)   Factor V Leiden (HCC)   History of colon cancer         DISPOSITION  ADMISSION    Discussed treatment plan and reason for admission with pt/family and admitting physician.  Pt/family voiced understanding of the plan for admission for further testing/treatment as needed.         Note Disclaimer: At [facility], we believe that sharing information builds trust and better relationships. You are receiving this note because you recently visited [facility]. It is possible you will see health information before a provider has talked with you about it. This kind of information can be easy to misunderstand. To help you fully understand what it means for your health, we urge you to discuss this note with your provider.     Pablo Abrams PA  02/05/23 0503

## 2023-02-05 NOTE — DISCHARGE SUMMARY
Date of Admission: 2/5/2023  Date of Discharge:  2/5/2023  Primary Care Physician: Deepika Vela III, NP-C     Discharge Diagnosis:  Active Hospital Problems    Diagnosis  POA   • **Neck pain on left side [M54.2]  Yes   • GERD (gastroesophageal reflux disease) [K21.9]  Yes   • HTN (hypertension) [I10]  Yes   • Adenocarcinoma of rectum (HCC) [C20]  Yes   • Monopolar depression (HCC) [F33.9]  Yes      Resolved Hospital Problems   No resolved problems to display.       Presenting Problem/History of Present Illness:  History of colon cancer [Z85.038]  Factor V Leiden (HCC) [D68.51]  History of DVT (deep vein thrombosis) [Z86.718]  Neck pain on left side [M54.2]     Hospital Course:  The patient is a 42 y.o. female who presented with acute DVT on the left side of her neck.  She was admitted oncology was consulted and she was able to obtain the ultrasound showing the DVT.  This was due to her only using the Lovenox once a day instead of twice a day at home.  She is going to resume Lovenox and take it twice a day.  Hematology is planning on following up in clinic.      Exam Today:  See H&P from same day    Results:  CTA Neck  There is an approximate 10% NASCET stenosis within both proximal  internal carotid arteries.     The vertebral arteries are unremarkable in appearance.     The left internal jugular vein is patent throughout its visualized  course although the most inferior aspect of the left internal jugular  vein as it joins the left innominate vein is not evaluated on this  study. The left innominate vein is diminutive in size. The left  innominate vein as well as the left subclavian vein are not evaluated  due to poor opacification. A right IJ approach central venous catheter  terminates at the level of the SVC. 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 There are multiple venous collaterals  adjacent to this  diminutive right innominate vein. The right subclavian  vein is irregular in appearance and is also likely reconstituted via  collaterals.     Duplex Left Upper Extremity/Neck  •  Acute left upper extremity deep vein thrombosis noted in the subclavian, axillary and brachial.  •  Acute left upper extremity superficial thrombophlebitis noted in the basilic (upper arm).  •  All other left sided vessels appear normal.  •  Diminished phasicity of bilateral internal jugular veins suggestive of proximal obstructive process.    Procedures Performed:         Consults:   Consults     Date and Time Order Name Status Description    2/5/2023 10:04 AM Hematology & Oncology Inpatient Consult      2/5/2023  4:58 AM Inpatient Hematology & Oncology Consult      2/5/2023  4:09 AM [facility] (on-call MD unless specified) Details             Discharge Disposition:  Home or Self Care    Discharge Medications:     Discharge Medications      New Medications      Instructions Start Date   vitamin D 1.25 MG (34726 UT) capsule capsule  Commonly known as: ERGOCALCIFEROL   50,000 Units, Oral, Every 7 Days         Changes to Medications      Instructions Start Date   famotidine 20 MG tablet  Commonly known as: PEPCID  What changed: when to take this   TAKE 1 TABLET BY MOUTH TWICE A DAY         Continue These Medications      Instructions Start Date   clonazePAM 1 MG tablet  Commonly known as: KlonoPIN   1 mg, Oral, 3 Times Daily PRN      dicyclomine 20 MG tablet  Commonly known as: BENTYL   TAKE 1 TABLET BY MOUTH EVERY 6 HOURS AS NEEDED      diphenoxylate-atropine 2.5-0.025 MG per tablet  Commonly known as: LOMOTIL   2 tablets, Oral, 3 Times Daily With Meals      Enoxaparin Sodium 100 MG/ML solution prefilled syringe syringe  Commonly known as: LOVENOX   1 mg/kg (100 mg), Subcutaneous, Every 12 Hours Scheduled      escitalopram 10 MG tablet  Commonly known as: Lexapro   10 mg, Oral, Daily      Imvexxy Maintenance Pack 10 MCG insert  Generic drug:  Estradiol   No dose, route, or frequency recorded.      Imvexxy Maintenance Pack 10 MCG insert  Generic drug: Estradiol   10 mcg, Vaginal, 2 Times Weekly      Loratadine 10 MG capsule   10 mg, Oral, Every Evening      metoprolol succinate XL 25 MG 24 hr tablet  Commonly known as: TOPROL-XL   12.5 mg, Oral, Nightly      nortriptyline 25 MG capsule  Commonly known as: PAMELOR   25 mg, Oral, Nightly      ondansetron 8 MG tablet  Commonly known as: ZOFRAN   TAKE 1 TABLET BY MOUTH THREE TIMES A DAY AS NEEDED FOR NAUSEA AND VOMITING      promethazine 25 MG tablet  Commonly known as: PHENERGAN   25 mg, Oral, Every 6 Hours PRN      VITAMIN B-12 PO   1 tablet, Oral, Every 48 Hours             Discharge Diet:   Diet Instructions     Advance Diet As Tolerated            Activity at Discharge:   Activity Instructions     Activity as Tolerated            Follow-up Appointments:   Follow-up Information     Deepika Vela III NP-C Follow up.    Specialty: Internal Medicine  Contact information:  [address] [place] [address] [address]  [place] [id number] [phone]             Dong Nguyen MD PhD Follow up.    Specialties: Hematology and Oncology, Hematology, Oncology  Contact information:  [address] [place] [address] [address]  [place] [address]  [phone]                         Test Results Pending at Discharge:       Lito Wu MD  02/05/23  16:09 EST    Time Spent on Discharge Activities: >30 minutes    Dictated portions using Dragon dictation software.  During the entire encounter, I was wearing recommended PPE including face mask and eye protection. Hand sanitization was performed prior to entering room and upon exit.

## 2023-02-05 NOTE — ED NOTES
"Pt ambulatory to triage from home with c/o \"something swollen in my left neck.\"  Pt states the area is getting larger and is sore when she talks.  Pt wearing mask in triage. Triage personnel wore appropriate PPE    "

## 2023-02-06 ENCOUNTER — TRANSITIONAL CARE MANAGEMENT TELEPHONE ENCOUNTER (OUTPATIENT)
Dept: CALL CENTER | Facility: HOSPITAL | Age: 43
End: 2023-02-06
Payer: COMMERCIAL

## 2023-02-06 NOTE — OUTREACH NOTE
Call Center TCM Note    Flowsheet Row Responses   [facility] patient discharged from? [place]   Does the patient have one of the following disease processes/diagnoses(primary or secondary)? Other   TCM attempt successful? No   Unsuccessful attempts Attempt 1   Call Status Left message          Tony Lyons RN    2/6/2023, 11:34 EST

## 2023-02-06 NOTE — OUTREACH NOTE
Call Center TCM Note    Flowsheet Row Responses   [facility] patient discharged from? [place]   Does the patient have one of the following disease processes/diagnoses(primary or secondary)? Other   TCM attempt successful? No  [Spose, Justus, and mother on release]   Unsuccessful attempts Attempt 2          Tony Lyons RN    2/6/2023, 12:03 EST

## 2023-02-07 ENCOUNTER — TRANSITIONAL CARE MANAGEMENT TELEPHONE ENCOUNTER (OUTPATIENT)
Dept: CALL CENTER | Facility: HOSPITAL | Age: 43
End: 2023-02-07
Payer: COMMERCIAL

## 2023-02-07 NOTE — OUTREACH NOTE
Call Center TCM Note    Flowsheet Row Responses   [facility] patient discharged from? [place]   Does the patient have one of the following disease processes/diagnoses(primary or secondary)? Other   TCM attempt successful? No   Unsuccessful attempts Attempt 3          Sandrita Shaver MA    2/7/2023, 16:31 EST

## 2023-02-12 DIAGNOSIS — F41.9 ANXIETY: ICD-10-CM

## 2023-02-12 DIAGNOSIS — K52.1 TOXIC GASTROENTERITIS AND COLITIS: ICD-10-CM

## 2023-02-13 RX ORDER — DIPHENOXYLATE HYDROCHLORIDE AND ATROPINE SULFATE 2.5; .025 MG/1; MG/1
TABLET ORAL
Qty: 120 TABLET | Refills: 2 | Status: SHIPPED | OUTPATIENT
Start: 2023-02-13 | End: 2023-03-07 | Stop reason: SDUPTHER

## 2023-02-13 RX ORDER — CLONAZEPAM 1 MG/1
TABLET ORAL
Qty: 90 TABLET | Refills: 0 | Status: SHIPPED | OUTPATIENT
Start: 2023-02-13 | End: 2023-04-04

## 2023-02-13 NOTE — TELEPHONE ENCOUNTER
Last office visit with prescribing clinician: 1/17/2023     Next office visit with prescribing clinician: 4/28/2023

## 2023-02-15 RX ORDER — NORTRIPTYLINE HYDROCHLORIDE 25 MG/1
25 CAPSULE ORAL NIGHTLY
Qty: 90 CAPSULE | Refills: 0 | Status: SHIPPED | OUTPATIENT
Start: 2023-02-15 | End: 2023-02-22

## 2023-02-22 ENCOUNTER — OFFICE VISIT (OUTPATIENT)
Dept: SURGERY | Facility: CLINIC | Age: 43
End: 2023-02-22
Payer: COMMERCIAL

## 2023-02-22 VITALS
BODY MASS INDEX: 31.87 KG/M2 | WEIGHT: 198.3 LBS | DIASTOLIC BLOOD PRESSURE: 76 MMHG | SYSTOLIC BLOOD PRESSURE: 124 MMHG | HEIGHT: 66 IN | OXYGEN SATURATION: 98 % | HEART RATE: 89 BPM

## 2023-02-22 DIAGNOSIS — Z85.048 HISTORY OF RECTAL CANCER: Primary | ICD-10-CM

## 2023-02-22 DIAGNOSIS — R19.8 LOW ANTERIOR RESECTION SYNDROME: ICD-10-CM

## 2023-02-22 PROCEDURE — 99024 POSTOP FOLLOW-UP VISIT: CPT | Performed by: PHYSICIAN ASSISTANT

## 2023-02-22 NOTE — PROGRESS NOTES
"Carole Weaver is a 42 y.o. female in for follow up of History of rectal cancer    Low anterior resection syndrome    Pt with pT3N1 rectal cancer stage IIIb is S/P laparoscopic LAR with diverting loop ileostomy 12/02/2019 and S/P Ileostomy takedown 11/14/2022.   CT of chest/abdomen/pelvis 01/17/2023 with stable disease.     Pt presents today for RLQ wound check.   Packing daily with 1/4 inch gauze, although does not feel wound is healing.     Pt denies any fecal smearing.   Taking Lomotil and Imodium and adjusting these to try to find a regimen that works for her.   Having small formed BMs, but is having difficulty with complete evacuation.   She is interested in pursing pelvic floor PT, but would like to hold off on this for now.     Pt was seen in the [facility] ER 02/05/2023 for neck pain. Venous duplex revealed acute DVT of the left subclavian, axillary, and brachial veins. Pt was found to be taking Lovenox QD instead of BID as instructed. Is now trying to be compliant with her anticoagulation.     /76 (BP Location: Left arm, Patient Position: Sitting, Cuff Size: Large Adult)   Pulse 89   Ht 167.6 cm (66\")   Wt 89.9 kg (198 lb 4.8 oz)   LMP  (LMP Unknown)   SpO2 98%   Breastfeeding No   BMI 32.01 kg/m²   Body mass index is 32.01 kg/m².      PE:  Physical Exam  Constitutional:       General: She is not in acute distress.     Appearance: She is well-developed.   HENT:      Head: Normocephalic and atraumatic.   Abdominal:      General: There is no distension.      Palpations: Abdomen is soft.      Comments: Abdomen: Soft, non-distended.  RLQ measuring 1.25 cm in depth.    Musculoskeletal:         General: Normal range of motion.   Neurological:      Mental Status: She is alert.   Psychiatric:         Thought Content: Thought content normal.       Assessment:   1. History of rectal cancer    2. Low anterior resection syndrome       Plan:  - Continue packing wound daily with gauze  - Pt instructed to call our " office when she is ready to start PT for pelvic floor strengthening and we will fax referral to [facility].   - Follow up in 2 weeks for wound check.

## 2023-02-28 ENCOUNTER — OFFICE VISIT (OUTPATIENT)
Dept: SURGERY | Facility: CLINIC | Age: 43
End: 2023-02-28
Payer: COMMERCIAL

## 2023-02-28 ENCOUNTER — INFUSION (OUTPATIENT)
Dept: ONCOLOGY | Facility: HOSPITAL | Age: 43
End: 2023-02-28
Payer: COMMERCIAL

## 2023-02-28 VITALS
HEIGHT: 66 IN | WEIGHT: 200 LBS | DIASTOLIC BLOOD PRESSURE: 70 MMHG | HEART RATE: 79 BPM | BODY MASS INDEX: 32.14 KG/M2 | OXYGEN SATURATION: 99 % | SYSTOLIC BLOOD PRESSURE: 118 MMHG

## 2023-02-28 DIAGNOSIS — Z45.2 ENCOUNTER FOR FITTING AND ADJUSTMENT OF VASCULAR CATHETER: Primary | ICD-10-CM

## 2023-02-28 DIAGNOSIS — Z85.048 HISTORY OF RECTAL CANCER: Primary | ICD-10-CM

## 2023-02-28 PROCEDURE — 96523 IRRIG DRUG DELIVERY DEVICE: CPT

## 2023-02-28 PROCEDURE — 99213 OFFICE O/P EST LOW 20 MIN: CPT | Performed by: PHYSICIAN ASSISTANT

## 2023-02-28 PROCEDURE — 25010000002 HEPARIN LOCK FLUSH PER 10 UNITS: Performed by: INTERNAL MEDICINE

## 2023-02-28 RX ORDER — SODIUM CHLORIDE 0.9 % (FLUSH) 0.9 %
10 SYRINGE (ML) INJECTION AS NEEDED
Status: DISCONTINUED | OUTPATIENT
Start: 2023-02-28 | End: 2023-02-28 | Stop reason: HOSPADM

## 2023-02-28 RX ORDER — HEPARIN SODIUM (PORCINE) LOCK FLUSH IV SOLN 100 UNIT/ML 100 UNIT/ML
500 SOLUTION INTRAVENOUS AS NEEDED
Status: DISCONTINUED | OUTPATIENT
Start: 2023-02-28 | End: 2023-02-28 | Stop reason: HOSPADM

## 2023-02-28 RX ADMIN — Medication 500 UNITS: at 07:38

## 2023-02-28 RX ADMIN — Medication 10 ML: at 07:38

## 2023-02-28 NOTE — PROGRESS NOTES
"Carole Weaver is a 42 y.o. female in for follow up of history of rectal cancer, low anterior resection syndrome. with pT3N1 rectal cancer stage IIIb is S/P laparoscopic LAR with diverting loop ileostomy 12/02/2019 and S/P Ileostomy takedown 11/14/2022. Pt called office today with c/o excruciating RLQ wound pain and concern for infection.    Sudden onset significant pain today around wound site. No fever/chills, nausea, or other concerning symptoms. She just thought it was strange to have pain start at this point in her postoperative course and wanted to be careful.    /70 (BP Location: Left arm, Patient Position: Sitting, Cuff Size: Large Adult)   Pulse 79   Ht 167.6 cm (66\")   Wt 90.7 kg (200 lb)   LMP  (LMP Unknown)   SpO2 99%   Breastfeeding No   BMI 32.28 kg/m²   Body mass index is 32.28 kg/m².  -  Physical Exam  No acute distress  Abdomen: No sign infection. Wound depth 1.2cm. no exudate. No erythema. Some ttp around wound. No guarding or palpable mass.     Assessment:   1. History of rectal cancer      pT3N1 rectal cancer stage IIIb is S/P laparoscopic LAR with diverting loop ileostomy 12/02/2019 and S/P Ileostomy takedown 11/14/2022    Plan:  • No concerning signs/symptoms other than pain. Discussed next step would be CT, which is not indicated at this time. Pt in agreement. Advised her to monitor symptoms and call if worsening. Keep appt next week. Continue packing wound.        Lurdes Tucker PA-C  Physician Assistant  Colorectal Surgery        "

## 2023-03-07 ENCOUNTER — OFFICE VISIT (OUTPATIENT)
Dept: SURGERY | Facility: CLINIC | Age: 43
End: 2023-03-07
Payer: COMMERCIAL

## 2023-03-07 VITALS
BODY MASS INDEX: 31.52 KG/M2 | WEIGHT: 196.1 LBS | HEART RATE: 80 BPM | SYSTOLIC BLOOD PRESSURE: 116 MMHG | OXYGEN SATURATION: 99 % | HEIGHT: 66 IN | DIASTOLIC BLOOD PRESSURE: 78 MMHG

## 2023-03-07 DIAGNOSIS — Z85.048 HISTORY OF RECTAL CANCER: Primary | ICD-10-CM

## 2023-03-07 PROCEDURE — 99024 POSTOP FOLLOW-UP VISIT: CPT | Performed by: PHYSICIAN ASSISTANT

## 2023-03-07 RX ORDER — DIPHENOXYLATE HYDROCHLORIDE AND ATROPINE SULFATE 2.5; .025 MG/1; MG/1
2 TABLET ORAL 4 TIMES DAILY
Qty: 240 TABLET | Refills: 11 | Status: SHIPPED | OUTPATIENT
Start: 2023-03-07 | End: 2024-03-06

## 2023-03-07 NOTE — PROGRESS NOTES
"Carole Weaver is a 42 y.o. female in for follow up of History of rectal cancer    Pt with pT3N1 rectal cancer stage IIIb is S/P laparoscopic LAR with diverting loop ileostomy 12/02/2019 and S/P Ileostomy takedown 11/14/2022.   CT of chest/abdomen/pelvis 01/17/2023 with stable disease.      Pt presents today for a follow up for a RLQ wound.   Packing daily with 1/4 inch gauze and feels the area is progressively decreasing in size.   She denies any new concerns at this time.     /78 (BP Location: Right arm, Patient Position: Sitting, Cuff Size: Large Adult)   Pulse 80   Ht 167.6 cm (66\")   Wt 89 kg (196 lb 1.6 oz)   LMP  (LMP Unknown)   SpO2 99%   BMI 31.65 kg/m²   Body mass index is 31.65 kg/m².      PE:  Physical Exam  Constitutional:       General: She is not in acute distress.     Appearance: She is well-developed.   HENT:      Head: Normocephalic and atraumatic.   Abdominal:      General: There is no distension.      Palpations: Abdomen is soft.      Comments: Abdomen soft, non-distended.   RLQ wound measuring approximately 1 cm in depth   Musculoskeletal:         General: Normal range of motion.   Neurological:      Mental Status: She is alert.   Psychiatric:         Thought Content: Thought content normal.       Review of Medical Record:     Colonoscopy 11/03/2022:  - Friability with contact bleeding throughout entire examines colon  - Patent functional end-to-end colo-rectal anastomosis  - Dr. Padmaja Ye, [facility]    Assessment:   1. History of rectal cancer    - pT3N1, stage IIIb     Plan:  - Continue packing wound daily with 1/4 inch Iodoform gauze  - Follow up in 2 weeks for reevaluation.         "

## 2023-03-08 ENCOUNTER — TELEPHONE (OUTPATIENT)
Dept: SURGERY | Facility: CLINIC | Age: 43
End: 2023-03-08
Payer: COMMERCIAL

## 2023-03-08 NOTE — TELEPHONE ENCOUNTER
Phoned patient and notified her that her rx was sent in last night. Patient voiced understanding.    She wanted me to tell you thank you so much!    ----- Message from Charissa Messina PA-C sent at 3/8/2023  6:33 AM EST -----  Please let Pt know Rx for Lomotil has been sent to pharmacy. Thank you.

## 2023-03-22 ENCOUNTER — OFFICE VISIT (OUTPATIENT)
Dept: SURGERY | Facility: CLINIC | Age: 43
End: 2023-03-22
Payer: COMMERCIAL

## 2023-03-22 ENCOUNTER — TELEPHONE (OUTPATIENT)
Dept: ONCOLOGY | Facility: CLINIC | Age: 43
End: 2023-03-22
Payer: COMMERCIAL

## 2023-03-22 VITALS
TEMPERATURE: 97.7 F | OXYGEN SATURATION: 96 % | HEIGHT: 66 IN | DIASTOLIC BLOOD PRESSURE: 80 MMHG | BODY MASS INDEX: 31.9 KG/M2 | SYSTOLIC BLOOD PRESSURE: 110 MMHG | HEART RATE: 79 BPM | WEIGHT: 198.5 LBS

## 2023-03-22 DIAGNOSIS — Z85.048 HISTORY OF RECTAL CANCER: Primary | ICD-10-CM

## 2023-03-22 PROCEDURE — 99024 POSTOP FOLLOW-UP VISIT: CPT | Performed by: PHYSICIAN ASSISTANT

## 2023-03-22 NOTE — TELEPHONE ENCOUNTER
Attempted to call no answer left message.  Due to HIPPA cannot answer any medical questions concerning this patient

## 2023-03-22 NOTE — PROGRESS NOTES
"Carole Weaver is a 42 y.o. female in for follow up of History of rectal cancer    Pt with pT3N1 rectal cancer stage IIIb is S/P laparoscopic LAR with diverting loop ileostomy on 12/02/2019 and S/P Ileostomy takedown 11/14/2022.     She presents today for a RLQ wound check.   Packing daily with 1/4 inch gauze.   Denies any pain or drainage.   No other concerns at this time.     /80 (BP Location: Left arm, Patient Position: Sitting, Cuff Size: Small Adult)   Pulse 79   Temp 97.7 °F (36.5 °C) (Temporal)   Ht 167.6 cm (66\")   Wt 90 kg (198 lb 8 oz)   LMP  (LMP Unknown)   SpO2 96%   Breastfeeding No   BMI 32.04 kg/m²   Body mass index is 32.04 kg/m².      PE:  Physical Exam  Constitutional:       General: She is not in acute distress.     Appearance: She is well-developed.   HENT:      Head: Normocephalic and atraumatic.   Abdominal:      General: There is no distension.      Palpations: Abdomen is soft.      Comments: Abdomen soft, non-distended.   RLQ wound measuring 0.75 cm in depth.    Musculoskeletal:         General: Normal range of motion.   Neurological:      Mental Status: She is alert.   Psychiatric:         Thought Content: Thought content normal.       Review of Medical Record:      Colonoscopy 11/03/2022:  - Friability with contact bleeding throughout entire examines colon  - Patent functional end-to-end colo-rectal anastomosis  - Dr. Padmaja Ye, [facility]    Assessment:   1. History of rectal cancer       Plan:  - Silver Nitrate applied to wound today to stimulate wound healing.   - Continue packing wound daily with 1/4 inch Iodoform gauze  - Follow up in 2-4 weeks for reevaluation.     "

## 2023-03-22 NOTE — TELEPHONE ENCOUNTER
Caller: KARLA    Relationship: [facility]    Best call back number: [phone] EXT [phone]    What was the call regarding: KARLA CALLED SHE IS THE PATIENTS  AND WANTED TO SPEAK TO THE NURSE    Do you require a callback: YES

## 2023-04-03 DIAGNOSIS — F41.9 ANXIETY: ICD-10-CM

## 2023-04-04 RX ORDER — CLONAZEPAM 1 MG/1
TABLET ORAL
Qty: 90 TABLET | Refills: 0 | Status: SHIPPED | OUTPATIENT
Start: 2023-04-04

## 2023-04-04 RX ORDER — FAMOTIDINE 20 MG/1
TABLET, FILM COATED ORAL
Qty: 180 TABLET | Refills: 1 | Status: SHIPPED | OUTPATIENT
Start: 2023-04-04 | End: 2023-04-07

## 2023-04-04 NOTE — TELEPHONE ENCOUNTER
Rx Refill Note  Requested Prescriptions     Pending Prescriptions Disp Refills   • clonazePAM (KlonoPIN) 1 MG tablet [Pharmacy Med Name: CLONAZEPAM 1 MG TABLET] 90 tablet 0     Sig: TAKE 1 TABLET BY MOUTH THREE TIMES A DAY AS NEEDED FOR ANXIETY      Last office visit with prescribing clinician: 1/17/2023   Last telemedicine visit with prescribing clinician: 4/28/2023   Next office visit with prescribing clinician: 4/28/2023         Erika Thakur MA  04/04/23, 08:19 EDT

## 2023-04-05 DIAGNOSIS — K59.1 FUNCTIONAL DIARRHEA: Primary | ICD-10-CM

## 2023-04-05 RX ORDER — DICYCLOMINE HCL 20 MG
TABLET ORAL
Qty: 360 TABLET | Refills: 4 | Status: SHIPPED | OUTPATIENT
Start: 2023-04-05 | End: 2023-04-06 | Stop reason: SDUPTHER

## 2023-04-05 NOTE — TELEPHONE ENCOUNTER
Caller: JOHANNA GONZALEZ    Relationship: PATIENT    Best call back number:     What is the best time to reach you:    Who are you requesting to speak with (clinical staff, provider,  specific staff member):     Do you know the name of the person who called:     What was the call regarding: PATIENT IS CALLING IN TO GET A UPDATE ON HER REFILL REQUEST FOR HER DICYCLOMINE 20MG    Do you require a callback: YES

## 2023-04-06 DIAGNOSIS — K59.1 FUNCTIONAL DIARRHEA: ICD-10-CM

## 2023-04-06 RX ORDER — DICYCLOMINE HCL 20 MG
20 TABLET ORAL EVERY 6 HOURS PRN
Qty: 360 TABLET | Refills: 2 | Status: SHIPPED | OUTPATIENT
Start: 2023-04-06

## 2023-04-06 RX ORDER — FAMOTIDINE 20 MG/1
TABLET, FILM COATED ORAL
Qty: 180 TABLET | Refills: 1 | OUTPATIENT
Start: 2023-04-06

## 2023-04-07 RX ORDER — FAMOTIDINE 20 MG/1
TABLET, FILM COATED ORAL
Qty: 180 TABLET | Refills: 1 | Status: SHIPPED | OUTPATIENT
Start: 2023-04-07

## 2023-04-11 ENCOUNTER — HOSPITAL ENCOUNTER (OUTPATIENT)
Dept: CT IMAGING | Facility: HOSPITAL | Age: 43
Discharge: HOME | End: 2023-04-11
Admitting: INTERNAL MEDICINE
Payer: COMMERCIAL

## 2023-04-11 DIAGNOSIS — C78.01 MALIGNANT NEOPLASM METASTATIC TO BOTH LUNGS: ICD-10-CM

## 2023-04-11 DIAGNOSIS — C78.02 MALIGNANT NEOPLASM METASTATIC TO BOTH LUNGS: ICD-10-CM

## 2023-04-11 DIAGNOSIS — C20 ADENOCARCINOMA OF RECTUM: Primary | ICD-10-CM

## 2023-04-11 DIAGNOSIS — Z45.2 ENCOUNTER FOR FITTING AND ADJUSTMENT OF VASCULAR CATHETER: ICD-10-CM

## 2023-04-11 LAB
ALBUMIN SERPL-MCNC: 3.6 G/DL (ref 3.5–5.2)
ALBUMIN/GLOB SERPL: 1.3 G/DL
ALP SERPL-CCNC: 85 U/L (ref 39–117)
ALT SERPL W P-5'-P-CCNC: 15 U/L (ref 1–33)
ANION GAP SERPL CALCULATED.3IONS-SCNC: 7 MMOL/L (ref 5–15)
AST SERPL-CCNC: 16 U/L (ref 1–32)
BASOPHILS # BLD AUTO: 0.01 10*3/MM3 (ref 0–0.2)
BASOPHILS NFR BLD AUTO: 0.2 % (ref 0–1.5)
BILIRUB SERPL-MCNC: 0.2 MG/DL (ref 0–1.2)
BUN SERPL-MCNC: 6 MG/DL (ref 6–20)
BUN/CREAT SERPL: 9.5 (ref 7–25)
CALCIUM SPEC-SCNC: 8.8 MG/DL (ref 8.6–10.5)
CEA SERPL-MCNC: 1 NG/ML
CHLORIDE SERPL-SCNC: 103 MMOL/L (ref 98–107)
CO2 SERPL-SCNC: 28 MMOL/L (ref 22–29)
CREAT SERPL-MCNC: 0.63 MG/DL (ref 0.57–1)
DEPRECATED RDW RBC AUTO: 45.3 FL (ref 37–54)
EGFRCR SERPLBLD CKD-EPI 2021: 113 ML/MIN/1.73
EOSINOPHIL # BLD AUTO: 0.17 10*3/MM3 (ref 0–0.4)
EOSINOPHIL NFR BLD AUTO: 3.3 % (ref 0.3–6.2)
ERYTHROCYTE [DISTWIDTH] IN BLOOD BY AUTOMATED COUNT: 13.4 % (ref 12.3–15.4)
GLOBULIN UR ELPH-MCNC: 2.8 GM/DL
GLUCOSE SERPL-MCNC: 93 MG/DL (ref 65–99)
HCT VFR BLD AUTO: 42.2 % (ref 34–46.6)
HGB BLD-MCNC: 13.9 G/DL (ref 12–15.9)
IMM GRANULOCYTES # BLD AUTO: 0.01 10*3/MM3 (ref 0–0.05)
IMM GRANULOCYTES NFR BLD AUTO: 0.2 % (ref 0–0.5)
LYMPHOCYTES # BLD AUTO: 1.13 10*3/MM3 (ref 0.7–3.1)
LYMPHOCYTES NFR BLD AUTO: 22.1 % (ref 19.6–45.3)
MCH RBC QN AUTO: 30.2 PG (ref 26.6–33)
MCHC RBC AUTO-ENTMCNC: 32.9 G/DL (ref 31.5–35.7)
MCV RBC AUTO: 91.5 FL (ref 79–97)
MONOCYTES # BLD AUTO: 0.37 10*3/MM3 (ref 0.1–0.9)
MONOCYTES NFR BLD AUTO: 7.2 % (ref 5–12)
NEUTROPHILS NFR BLD AUTO: 3.43 10*3/MM3 (ref 1.7–7)
NEUTROPHILS NFR BLD AUTO: 67 % (ref 42.7–76)
NRBC BLD AUTO-RTO: 0 /100 WBC (ref 0–0.2)
PLATELET # BLD AUTO: 236 10*3/MM3 (ref 140–450)
PMV BLD AUTO: 9.6 FL (ref 6–12)
POTASSIUM SERPL-SCNC: 4.1 MMOL/L (ref 3.5–5.2)
PROT SERPL-MCNC: 6.4 G/DL (ref 6–8.5)
RBC # BLD AUTO: 4.61 10*6/MM3 (ref 3.77–5.28)
SODIUM SERPL-SCNC: 138 MMOL/L (ref 136–145)
WBC NRBC COR # BLD: 5.12 10*3/MM3 (ref 3.4–10.8)

## 2023-04-11 PROCEDURE — 25510000001 IOPAMIDOL 61 % SOLUTION: Performed by: INTERNAL MEDICINE

## 2023-04-11 PROCEDURE — 80053 COMPREHEN METABOLIC PANEL: CPT | Performed by: INTERNAL MEDICINE

## 2023-04-11 PROCEDURE — 25010000002 HEPARIN LOCK FLUSH PER 10 UNITS: Performed by: INTERNAL MEDICINE

## 2023-04-11 PROCEDURE — 82378 CARCINOEMBRYONIC ANTIGEN: CPT | Performed by: INTERNAL MEDICINE

## 2023-04-11 PROCEDURE — 0 DIATRIZOATE MEGLUMINE & SODIUM PER 1 ML: Performed by: INTERNAL MEDICINE

## 2023-04-11 PROCEDURE — 74177 CT ABD & PELVIS W/CONTRAST: CPT

## 2023-04-11 PROCEDURE — 85025 COMPLETE CBC W/AUTO DIFF WBC: CPT | Performed by: INTERNAL MEDICINE

## 2023-04-11 PROCEDURE — 71260 CT THORAX DX C+: CPT

## 2023-04-11 RX ORDER — HEPARIN SODIUM (PORCINE) LOCK FLUSH IV SOLN 100 UNIT/ML 100 UNIT/ML
500 SOLUTION INTRAVENOUS AS NEEDED
OUTPATIENT
Start: 2023-04-11

## 2023-04-11 RX ORDER — HEPARIN SODIUM (PORCINE) LOCK FLUSH IV SOLN 100 UNIT/ML 100 UNIT/ML
500 SOLUTION INTRAVENOUS AS NEEDED
Status: DISCONTINUED | OUTPATIENT
Start: 2023-04-11 | End: 2023-04-12 | Stop reason: HOSPADM

## 2023-04-11 RX ORDER — SODIUM CHLORIDE 0.9 % (FLUSH) 0.9 %
10 SYRINGE (ML) INJECTION AS NEEDED
Status: DISCONTINUED | OUTPATIENT
Start: 2023-04-11 | End: 2023-04-12 | Stop reason: HOSPADM

## 2023-04-11 RX ORDER — SODIUM CHLORIDE 0.9 % (FLUSH) 0.9 %
10 SYRINGE (ML) INJECTION AS NEEDED
OUTPATIENT
Start: 2023-04-11

## 2023-04-11 RX ADMIN — HEPARIN 500 UNITS: 100 SYRINGE at 09:27

## 2023-04-11 RX ADMIN — DIATRIZOATE MEGLUMINE AND DIATRIZOATE SODIUM 30 ML: 600; 100 SOLUTION ORAL; RECTAL at 08:15

## 2023-04-11 RX ADMIN — IOPAMIDOL 85 ML: 612 INJECTION, SOLUTION INTRAVENOUS at 09:13

## 2023-04-11 RX ADMIN — Medication 10 ML: at 09:26

## 2023-04-19 ENCOUNTER — OFFICE VISIT (OUTPATIENT)
Dept: ONCOLOGY | Facility: CLINIC | Age: 43
End: 2023-04-19
Payer: COMMERCIAL

## 2023-04-19 VITALS
WEIGHT: 202 LBS | HEART RATE: 78 BPM | OXYGEN SATURATION: 97 % | HEIGHT: 66 IN | BODY MASS INDEX: 32.47 KG/M2 | DIASTOLIC BLOOD PRESSURE: 67 MMHG | TEMPERATURE: 97.8 F | SYSTOLIC BLOOD PRESSURE: 102 MMHG | RESPIRATION RATE: 16 BRPM

## 2023-04-19 DIAGNOSIS — C78.02 MALIGNANT NEOPLASM METASTATIC TO BOTH LUNGS: ICD-10-CM

## 2023-04-19 DIAGNOSIS — C78.01 MALIGNANT NEOPLASM METASTATIC TO BOTH LUNGS: ICD-10-CM

## 2023-04-19 DIAGNOSIS — C20 ADENOCARCINOMA OF RECTUM: Primary | Chronic | ICD-10-CM

## 2023-04-19 NOTE — PROGRESS NOTES
REASON FOR FOLLOW UP:     1. Rectal cancer, rectal ultrasound examination in July 2019 reported T3N0 disease without lymphadenopathy. She does have small pulmonary nodule 6-7 mm and 2 mm with indeterminate feature. There is no solid evidence of metastatic disease otherwise. Patient has stage IIA (T3N0M0) disease.  2. The patient is heterozygous factor V Leiden, was on prophylactic anticoagulation with Lovenox 40 mg daily given her increased risk of thrombosis with MediPort and GI malignancy.   3. PET scan on 8/8/2019 reported an 8 mm hypermetabolic right upper lobe pulmonary nodule, which is suspicious for metastatic as well.    4. Patient had a PowerPort placement on the left upper chest by Dr. Joseph on 8/9/2019.  5. Patient was started on concurrent infusional 5-FU chemoradiation therapy on 8/12/2019, with planned complete surgical resection and further adjuvant chemotherapy with intention to cure the disease.   6. Patient underwent surgical resection of the primary rectal cancer by Dr. Ye on 12/2/2019.  Pathology evaluation reported residual T3N1 disease stage IIIb.  7. Diarrhea related to therapy and radiation.   8. Patient was started cycle 1 day 1 of adjuvant FOLFOX 6 on 1/23/2020.  9. On 2/5/2020 FOLFOX 6 cycle 2 delayed secondary to neutropenia.  Patient was given 3 days of Granix injection.  After cycle #2 we planned 3 days of Granix with each cycle.   10. Patient also intolerant of oral iron.  Patient received 2 doses of IV injectafer on 02/05/2020 and 02/12/2020.   11. 02/12/2020 Proceed with cycle #2 of FOLFOX 6 with 3 days of Granix.  12. On 3/11/2020 cycle 4 postponed secondary to abdominal pain and occasional low-grade fevers.  CT scans ordered.  13. Cycle #4 resumed after CT scan revealed no evidence of disease.  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 Ye. patient has no fever.  Continue to observe.   14. Grade 3  hand-foot syndrome secondary to 5-FU after cycle #7 FOLFOX 6 chemotherapy, prompting ER visit.  Also has worsening peripheral neuropathy.   15. Cycle #8 FOLFOX 6 was given on 5/13/2020, with 50% dose reduction for both 5-FU and oxaliplatin, and also examination of bolus 5-FU.   16. PET scan examination on 5/21/2020 reported no evidence of metastatic disease in the chest abdomen pelvis.  Stable 6 mm RUL pulmonary nodule.  17. On 5/27/2020, I discussed with the patient that we can discontinue chemotherapy at this time.   18. Patient had a surgical procedure for low anterior colon resection, coloanal anastomosis on 8/3/2020.  19. CT scan for chest abdomen pelvis on 9/9/2020 reported a new 8 mm noncalcified pulmonary nodule in the left lower lobe of the lung.  Otherwise stable right upper lobe 6 mm pulmonary nodule, and no evidence of disease recurrence in the abdomen or pelvis.  20. PET scan examination on 9/18/2020 reported multiple hypermetabolic small pulmonary nodules/ new pulmonary nodules.   21. Patient was started on pelvic chemotherapy with FOLFIRI regimen on 10/13/2020.   22. 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 It reported tumor mutation burden 5 Muts/Mb, microsatellite stable, TP53 mutation R282W, and others.   23. Cycle #5 was without irinotecan, due to peripheral neuropathy.  24. 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  Patient was switched to weight-based Lovenox injection.  25. Cycle #6 5-FU and irinotecan was delayed by 2 weeks because of the above incident.  26. Patient had a telemedicine evaluation at that the [facility] [place] in February 2021.  They agreed with our treatment plan for palliative chemotherapy followed by maintenance chemotherapy.    27. PET scan examination on 4/6/2021 after cycle #12 palliative chemotherapy reported no  evidence of hypermetabolic metastatic lesion.   28. Patient was started on maintenance chemotherapy with 5-FU and Avastin on 4/13/2021. (Unable to tolerate Xeloda because of a significant hand-foot syndrome).   29. PET scan on 9/24/2021 obtained after cycle #12 maintenance chemotherapy with 5-FU, leucovorin, Avastin reported no evidence for active disease. We recommended 3 months chemotherapy holiday.  30. CT scan examination on 3/15/2022 reported slightly disease progression with increase in size of small pulmonary nodule.  We started patient back on maintenance chemotherapy on 3/29/2022 treatment with 5-FU plus leucovorin and Avastin, repeating every 2 weeks.  31. After 6 more maintenance chemotherapy, CT scan for chest abdomen pelvis was done on 6/21/2022 which reported stable sub-6 mm pulmonary nodules.  Patient had last maintenance chemotherapy on 6/7/2022.       HISTORY OF PRESENT ILLNESS:  The patient is a 42 y.o. female with the above-mentioned issue who presented for follow-up evaluation on 4/19/2023 after recent CT scan and laboratory studies on 4/11/2023.  Patient is accompanied by her  today.     Patient reports she is doing relatively well. She still has issues with diarrhea intermittently and has to use Imodium and Lomotil, but it is sometimes because of that she gets constipated. She is trying to find out the best way to manage her bowel movements. She has no melena or hematochezia. Patient reports she will see Dr. Ye in the next few days to discuss management plan to further regulate her bowel movement.            Past Medical History:   Diagnosis Date   • Abdominopelvic abscess 08/12/2020    ADMITTED TO [facility]   • Abnormal Pap smear of cervix 02/02/1998    JULIUS I   • Abscess of abdominal wall 11/28/2012    SEEN AT [facility] ER   • Anemia in pregnancy    • Anxiety    • Bilateral epiphora 04/2020   • Bilateral hand swelling 05/02/2020    SEEN AT [facility] ER   • Cervical lymphadenitis 06/06/2012    SEEN AT  [facility] ER   • Chemotherapy induced diarrhea 01/2021   • Chemotherapy induced neutropenia 04/2020   • Chemotherapy-induced nausea 02/2020   • Chemotherapy-induced thrombocytopenia 05/2020   • Chronic anticoagulation    • Chronic diarrhea    • Colon polyps     FOLLOWED BY DR. GEORNIMO ESPARZA   • Coronary artery calcification    • COVID-19 06/09/2022   • Cystitis 04/24/2020    WITH DEHYDRATION, ADMITTED TO 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 ALEXANDRU HUNT   • History of gestational diabetes    • History of pre-eclampsia 1998   • History of pre-eclampsia    • History of radiation therapy 2019    FOLLOWED BY DR. JAVON LEWIS   • History of TB skin testing 01/17/2009    TB Skin Test   • History of TB skin testing 1/17/2009    TB Skin Test   • History of transfusion 2019    12/2019   • HPV in female 1998   • Hypokalemia 09/2019   • Hypomagnesemia 09/2019   • Hyponatremia 06/2021   • IBS (irritable bowel syndrome)    • Infected insect bite of neck 05/27/2016    SEEN AT [facility]   • Kidney stones 08/09/2007    SEEN AT [facility] ER   • Low anterior resection syndrome 12/2022    FOLLOWED BY DR. 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   • Perirectal abscess 08/12/2020   • Pulmonary embolism without acute cor pulmonale  09/20/2019    X 3; ADMITTED TO [facility]   • Pulmonary nodule, right 2020   • Rectal cancer 2019    STAGE IIA, INVASIVE MODERATELY DIFFERENTIATED ADENOCARCINOMA, CHEMO AND XRT FINISHED 2019   • Right shoulder pain 2020    SEEN AT [facility] ER   • Right ureteral stone 10/01/2019    SEEN AT [facility] ER   • SAB (spontaneous ) 1996     A2-1 INDUCED   • Sciatica    • Sepsis due to cellulitis 2002    LEFT GREAT TOE, ADMITTED TO [facility]   • Sepsis due to cellulitis 2002    LEFT GREAT TOE, ADMITTED TO [facility]   • Tachycardia 2020   • Thrombosis of superior vena cava 2020    AND BRACHIOCEPHALIC VEIN, ADMITTED TO [facility]   • Urinary urgency 2020   · Patient had COVID-19 infection diagnosed on 2022 despite was fully vaccinated and boosted.  She recovered without problem.      Past Surgical History:   Procedure Laterality Date   • ABDOMINAL SURGERY     • CHOLECYSTECTOMY N/A 10/10/2003    LAPAROSCOPIC WITH CHOLANGIOGRAM, DR. JAMEY TALAVERA AT [facility]   • COLON RESECTION N/A 2019    Procedure: laparoscopic low anterior colon resection with mobilization of splenic flexure and diverting loop ileostomy: ERAS;  Surgeon: Padmaja Esparza MD;  Location: [facility] OR;  Service: General   • COLON RESECTION N/A 2020    Procedure: LOW ANTERIOR COLON RESECTION, COLOANAL ANASTOMOSIS, MOBILIZATION SPLENIC FLEXURE;  Surgeon: Padmaja Esparza MD;  Location: [facility] OR;  Service: General;  Laterality: N/A;   • COLONOSCOPY N/A 07/15/2020    PATENT ANASTAMOSIS IN MID RECTUM, RESCOPE IN 1 YR, DR. PADMAJA ESPARZA AT [facility]   • COLONOSCOPY N/A 2022    DIFFUSE AREA OF MODERATELY FRIABLE MUCOSA IN ENTIRE COLON , DIVERSION COLITIS, PATENT AND HEALTHY ANASTAMOSIS, DR. PADMAJA ESPARZA AT [facility]   • COLONOSCOPY W/ POLYPECTOMY N/A 2019    15 MM TUBULOVILLOUS ADENOMA POLYP IN HEPATIC FLEXURE, 20 MMTUBULOVILLOUS ADENOMA WITH HIGH GRADE DYSPLASIA IN RECTOSIGMOID, 4 CM MASS IN MID RECTUM, PATH: INVASIVE  MODERATELY DIFFERENTIATED ADENOCARCINOMA, DR. JENNIFER LI AT [facility]   • DILATATION AND EVACUATION N/A 2009   • ENDOSCOPY N/A 07/08/2019    LA GRADE A ESOPHAGITIS, GASTRITIS, ALL BIOPSIES BENIGN, DR. JENNIFER LI AT [facility]   • ILEOSTOMY CLOSURE N/A 11/14/2022    Procedure: ILEOSTOMY TAKEDOWN;  Surgeon: Geronimo Ye MD;  Location: [facility] OR;  Service: General;  Laterality: N/A;   • INCISION AND DRAINAGE PERIRECTAL ABSCESS N/A 08/14/2020    Procedure: INCISION AND DRAINAGE OF retrorectal dehiscence abcess with drain placement and irrigation;  Surgeon: Geronimo Ye MD;  Location: [facility] MAIN OR;  Service: General;  Laterality: N/A;   • PAP SMEAR N/A 01/24/2014   • SIGMOIDOSCOPY N/A 07/24/2019    INFILTRATIVE PARTIALLY OBSTRUCTING LARGE RECTAL CANCER, AREA TATOOED, DR. GERONIMO YE AT [facility]   • SIGMOIDOSCOPY N/A 11/23/2019    AN ULCERATED PARTIALLY OBSTRUCTING MASS IN MID RECTUM, AREA TATTOOED, DR. GERONIMO YE AT [facility]   • SIGMOIDOSCOPY N/A 07/22/2021    PATENT ANASTAMOSIS IN DISTAL RECTUM, RESCOPE IN 1 YR, DR. GERONIMO YE AT [facility]   • TRANSRECTAL ULTRASOUND N/A 07/24/2019    Procedure: ULTRASOUND TRANSRECTAL;  Surgeon: Geronimo Ye MD;  Location: [facility] ENDOSCOPY;  Service: General   • URETEROSCOPY LASER LITHOTRIPSY WITH STENT INSERTION Right 10/30/2019    DR. ESTUARDO BEASLEY AT [facility]   • VAGINAL DELIVERY N/A 09/18/1998   • VENOUS ACCESS DEVICE (PORT) INSERTION Left 08/09/2019    Procedure: INSERTION VENOUS ACCESS DEVICE;  Surgeon: Sj Joseph MD;  Location: [facility] OR [facility] [place] [place];  Service: General   • VENOUS ACCESS DEVICE (PORT) INSERTION N/A 12/18/2020    Procedure: INSERTION VENOUS ACCESS DEVICE right side, removal venous access device left side;  Surgeon: Sj Joseph MD;  Location: [facility] OR [facility] [place] [place];  Service: General;  Laterality: N/A;   • WISDOM TOOTH EXTRACTION Bilateral 1993       HEMATOLOGIC/ONCOLOGIC HISTORY:      The patient reports she has intermittent rectal  bleeding and urgency, mucous with her stool, starting sometime in 2016. At that time she was referred to GI service, and was diagnosed of irritable bowel syndrome. Nevertheless she had worsening urgency for bowel movement, and had a couple of incidents losing control of stool. She was recently seen by Roland Thorpe MD again and had colonoscopy and EGD exam on 07/08/2019. She was found having a circumferential rectal mass. According to the procedure note, the patient had a fungating circumferential bleeding 4 cm mass in the middle rectum, at distance between 13 cm and 17 cm from the anus. Mass was causing partial obstruction. There were also colon polyps found at the hepatic flexure and also at the rectosigmoid junction 23 cm from the anus. Both were resected and retrieved. EGD examination reported grade A esophagitis at the GE junction and patchy discontinuous erythema and aggravation of the mucosa without bleeding in the stomach body. There is normal mucosa of the duodenum. Pathology evaluation from this procedure reported moderately differentiated adenocarcinoma involving the rectal mass. The rectal sigmoid junction polyp was tubular/tubulovillous adenoma with high grade dysplasia negative for invasive malignancy. The hepatic flexure polyp was also tubular/tubulovillous adenoma negative for high grade dysplasia or malignancy. The biopsy from the esophagus reports squamocolumnar mucosa with inflammatory changes suggestive of mild reflux esophagitis, negative for interstitial metaplasia. Gastric biopsy was benign and duodenal biopsy was also benign. There is no mention of H-pylori.     Patient was subsequently referred to colorectal surgeon Padmaja Ye MD for further evaluation. The patient had CT scan examination for chest, abdomen and pelvis requested by Dr. Ye and were done on 07/13/2019. The study reported no evidence of lymphadenopathy in the abdomen and pelvis. There was wall thickening of the rectosigmoid  junction. Normal spleen, pancreas, adrenal glands and kidneys. There was fatty liver infiltration but no focal lymphatic lesions. There was a small 6-7 mm noncalcified nodule in the right upper lobe and a tiny 3 mm subpleural nodule in the right middle lobe. No mediastinal or hilar lymphadenopathy.     Dr. Ye performed staging rectal ultrasound examination. This study reported tumor penetrating through the muscularis propria as T3 disease; there were no lymph nodes identified.    She had a hospitalization in late September 2019 because of newly discovered pulmonary emboli.  She was on prophylactic Lovenox prior to the incident of PE.  Now she is on full dose Xarelto anticoagulation.  Patient reports no further chest pain dyspnea.  She denies bleeding or bruising.  During her hospitalization, she was seen by Dr. Ye, who plans to have surgery 8 to 12 weeks after finishing radiation therapy.  She finished her radiation on 9/19/2019.     I noticed patient also presented to the emergency room on 10/1/2019 complaining of right flank area pain.  I reviewed the images studies and indeed she has a very small 1 to 2 mm obstructing kidney stone in the UV junction.  Patient is still symptomatic with some pains and dysuria.  She denies fever sweating or chills.    Laboratory study on 10/7/2019 reported normal CBC including hemoglobin 13.1, platelets 301,000, WBC 6170 and ANC 4900 lymphocytes 590 monocytes 480.      The nurse reported malfunction of port-a-catheter on 10/7/2019.  Port study on 10/8/2019 showed fibrin sheath around the distal aspect of the Mediport catheter in the SVC. This does not appear to hinder injection, but does prevent aspiration at this time.    Patient underwent surgical resection of the colon on 12/2/2019 with Dr. Ye.  Pathology evaluation reported residual T3 disease, also 1 out of 14 lymph nodes positive for malignancy.  So this patient in either had at least stage IIIb disease (T3 N1 M0?).       Adjuvant chemotherapy FOLFOX 6 will be started on 1/22/2020.  Laboratory study reported iron saturation 10%, free iron 31 TIBC 319 and ferritin 168.  Her hemoglobin was 11.8, WBC 5600, and platelets 347,000.    Patient was here on 02/12/2020 for cycle 2 of her FOLFOX.  This is delayed x1 week secondary to neutropenia.  The patient ultimately received 3 days worth of Neupogen with recovery of her blood counts.  Of note, the patient struggled with significant nausea without any episodes of vomiting following cycle #1 of chemotherapy resulting in significant weight loss.  She is up 12 pounds over the last week as her appetite has normalized.  We will add Emend to her care plan.    She is having several loose stools per her colostomy and has been hesitant to take Imodium due to prior history of constipation.  I encouraged her to try this with a maximum of 8 tablets/day.  She denies any infectious symptoms including fevers or chills.  No excess bleeding or bruising noted.  She had the expected cold sensitivity related to the Oxaliplatin for about 3 days following treatment.    Labs from 02/12/2020 demonstrated total white blood cell count of 5.14, ANC of 3.06, hemoglobin of 11.2, platelets of 211,000.  She was found to be iron deficient last week and is intolerant to oral iron secondary to GI distress.  For this reason, she initiated IV iron therapy with Injectafer last week.  She had received her second dose 02/12/2020    Patient has normalized hemoglobin 12.2 on 2/26/2020.    She reported on 5/5/2020 she had a recent visit to the emergency department for what was suspected to be an allergic reaction.  She called our on-call service on Saturday with reports of hand and face swelling.  She was instructed to proceed to the emergency department at which time she was assessed and prescribed a Medrol Dosepak.  She had just completed her 5-FU and was unhooked on Friday, 5/3/2020.  Her symptoms have improved.  She does  report persistent hyperpigmentation and mild swelling of the palms of the hands but this is much improved.  It was her right hand specifically that was swollen.  Her facial swelling has resolved.  She continues on Cefdinir nd since has 1 day remaining, she was prescribed cefdinir for a UTI requiring hospitalization at the end of April.  Reports no new symptoms.  Her labs are stable.  She is scheduled for treatment again.    Patient states at this time she is not tolerating her chemotherapy well.     Patient seen in the emergency department on 5/2/2020 for what was suspected to be an allergic reaction.  She called our on-call service on Saturday explaining that she was experiencing hand and face swelling.  She was instructed to proceed to the emergency department at which time she was assessed and prescribed a Medrol Dosepak.  She had just completed her 5-FU and was unhooked on Friday, 5/1/2020.      She reports since her ED visit on 5/2/2020 her symptoms have not improved. Her hands and feet were swollen upon presenting to the ED and she could barely make a fist. She states that she feels so swollen she is not able to stand it. She states that her skin on the hands and feet are peeling extensively as well, besides changing color to more dark.     She also reports significant fatigue after her first week of the 5-FU treatment but she expected this side effect. She also notices significant increase in her neuropathy to the point that she is not able to even walk around in her home without her house slippers due to irritation from her rugs. She denies and associated nausea or vomiting at this time. She also has episodes of epistaxis every day after the chemotherapy cycle #7.  She does report working full-time during the week of chemotherapy.    Laboratory studies, 5/13/2020, show moderate thrombocytopenia platelets 123,000, low normal WBC 4140 including ANC 2720 and normal hemoglobin 13.4.  Because significant  hand-foot syndrome, will decrease both 5-FU and oxaliplatin by 50%, and eliminate bolus dose of 5-FU.    On 5/21/2020 patient had a PET scan performed which showed no convincing evidence of residual disease in abdomen or pelvis, no metastatic disease within the chest or neck.     Cycle #8 FOLFOX 6 was given on 5/13/2020, with 50% dose reduction for both 5-FU and oxaliplatin, and also examination of bolus 5-FU.  She states on 5/27/2020 that with the recent reduction of the chemotherapy she feels significantly better. She has more energy and is able to do her daily routine.      PET scan examination on 5/21/2020 reported no evidence of metastatic disease in chest abdomen pelvis.  There was a stable 6 mm right upper lobe pulmonary nodule.    Laboratory studies on 5/27/2020 showed mild leukocytopenia WBC 3720 but a normal ANC 2250 and lymphocytes 630.  Normal platelets 163,000 and hemoglobin 12.6.  Chemistry lab reported normal renal function, liver function panel and a electrolytes, elevated glucose 164.    Laboratory studies 6/24/2020, showed normal hemoglobin 13.4 but macrocytosis .9.  Normal platelets 210,000 and WBC 5870.  She had normal CMP.     Patient last time was here in late June 2020.  Since that time she had surgical procedure for low anterior colon resection, coloanal anastomosis on 8/3/2020.  She later developed a perirectal abscess, required surgical drainage on 8/14/2020.  She was discharged on 8/27/2020.    Patient reports to me she still has lower abdominal wall vacuum suction in place.  She denies fever sweating chills.  Performance status is ECOG 1.  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  She does have visiting nurse come to the home for wound care.    CT scan for chest abdomen pelvis on 9/9/2020 reported a new 8 mm noncalcified pulmonary nodule in the left lower lobe.  Otherwise stable right upper lobe 6 mm pulmonary nodule, and no evidence of disease  recurrence in the abdomen or pelvis.     Laboratory study on 9/16/2020 reported elevated AST 55, ALT 95, alk phosphatase 143 but normal total bilirubin 0.3.  Chemistry lab otherwise unremarkable.  She has completed normal CBC.      Because of the abnormal CT scan examination for chest abdomen pelvis on 9/9/2020 reported a new 8 mm noncalcified pulmonary nodule in the left lower lobe, we obtained a PET scan examination for further evaluation, which was done on 9/18/2020.  This study reported several pulmonary nodules, some of them are hypermetabolic, newly developed compared to previous PET scan in May 2020.  Certainly does highly suspicious for metastatic disease.  There are also hypermetabolic activity in the abdomen and pelvic area which is hard to differentiate from surgical scar versus metastatic malignancy.    Laboratory study on 10/13/2020 reported normal CBC with Hb 13.9, platelets 302,000 and WBC 5520 including ANC 3830.  Chemistry lab reported normalized AST 20, alk phosphatase 116 improved ALT 35, and maintains normal bilirubin, with normal electrolytes and liver function panel.     Patient was started on first cycle of palliative chemotherapy FOLFIRI on 10/13/2020.    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  Patient had a new port placed 12/18/2020, and had held her Xarelto for 2 days prior to surgery.  She presented to the ER on 12/21/20 with complaints of facial and arm swelling for 3 days.  She also noted increased shortness of breath.  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  Thrombus within the right internal jugular vein cannot be excluded.  Patient was started on IV heparin, and eventually transitioned to weight-based Lovenox, which she now continues.    PET scan examination on 9/24/2021 reported  further shrinking of the tiny right upper lobe pulmonary nodule.  Otherwise no new lesions.  No evidence for metastatic disease in other areas.      Patient had CT scan for chest with IV contrast obtained on 12/14/2021 which reported small tiny stable pulmonary nodules. There is a 4 mm right upper lobe pulmonary nodule. There is also a stable 4 mm left lower lobe pulmonary nodule. There is also stable left upper lobe micronodule.     Laboratory studies today on 12/21/2021 reported normal hemoglobin 15.9 however .0, platelets 218,000 WBC 5360 including neutrophils 3730 lymphocytes 980. Chemistry lab reported normal renal function, electrolytes, glucose, and marginally elevated ALT 55, AST 34 but normal total bilirubin and alk phosphatase.     CT scan for chest abdomen pelvis 6/21/2022 reported sub-6 mm pulmonary nodules either stable or slightly smaller.  No new pulmonary nodules or evidence for disease progression.  There is no evidence for metastatic disease in the liver however it shows diffuse hepatic steatosis.  There was masslike thickening in the presacral space with the clips or calcification approximately 1.7 cm in greatest AP dimension but stable.    Laboratory study on 9/20/2022 reported normal hemoglobin 15.7 with mild macrocytosis .1.  She has normal CBC and platelets.  She also has unremarkable CMP.  Normal CEA 1.13 ng/mL.    Patient was seen by Dr. Ye, who performed ileostomy takedown on 11/14/2022.  Patient reports that she is recovering.  She still has a small open wound less than 1 cm in diameter, however close to 2 cm deep.  She has been changing dressing herself.  She denies fever sweating chills.    Patient has no other specific complaints.  She has excellent performance status ECOG 0.  She denies chest pain dyspnea cough hemoptysis.  No abdominal pain.  No melena hematochezia.  Patient has been eating well, stable weight.  She works full-time.    She continues Lovenox injections  and denies any significant bleeding issues.   No other new problems or concerns.     CT scan for chest abdomen pelvis obtained on 1/10/2023 reported stable small pulmonary nodules, no evidence for active or new disease.    Laboratory study on 1/10/2023 reported normal CBC and normal CMP.  CEA level is 0.99 ng/mL.      MEDICATIONS    Current Outpatient Medications:   •  clonazePAM (KlonoPIN) 1 MG tablet, TAKE 1 TABLET BY MOUTH THREE TIMES A DAY AS NEEDED FOR ANXIETY, Disp: 90 tablet, Rfl: 0  •  Cyanocobalamin (VITAMIN B-12 PO), Take 1 tablet by mouth Every Other Day., Disp: , Rfl:   •  dicyclomine (BENTYL) 20 MG tablet, Take 1 tablet by mouth Every 6 (Six) Hours As Needed (for irritable bowel symptoms). Indications: Irritable Bowel Syndrome, Disp: 360 tablet, Rfl: 2  •  diphenoxylate-atropine (Lomotil) 2.5-0.025 MG per tablet, Take 2 tablets by mouth 4 (Four) Times a Day., Disp: 240 tablet, Rfl: 11  •  Enoxaparin Sodium (LOVENOX) 100 MG/ML solution prefilled syringe syringe, Inject 1 mL under the skin into the appropriate area as directed Every 12 (Twelve) Hours., Disp: 60 mL, Rfl: 2  •  escitalopram (Lexapro) 10 MG tablet, Take 1 tablet by mouth Daily., Disp: 90 tablet, Rfl: 1  •  Estradiol (Imvexxy Maintenance Pack) 10 MCG insert, Insert 10 mcg into the vagina 2 (Two) Times a Week., Disp: , Rfl:   •  famotidine (PEPCID) 20 MG tablet, TAKE 1 TABLET BY MOUTH TWICE A DAY, Disp: 180 tablet, Rfl: 1  •  Imvexxy Maintenance Pack 10 MCG insert, , Disp: , Rfl:   •  Loratadine 10 MG capsule, Take 1 capsule by mouth Every Evening., Disp: , Rfl:   •  metoprolol succinate XL (TOPROL-XL) 25 MG 24 hr tablet, TAKE 0.5 TABLETS BY MOUTH EVERY NIGHT, Disp: 45 tablet, Rfl: 3  •  ondansetron (ZOFRAN) 8 MG tablet, TAKE 1 TABLET BY MOUTH THREE TIMES A DAY AS NEEDED FOR NAUSEA AND VOMITING, Disp: 60 tablet, Rfl: 1  •  promethazine (PHENERGAN) 25 MG tablet, Take 1 tablet by mouth Every 6 (Six) Hours As Needed for Nausea or Vomiting., Disp:  "60 tablet, Rfl: 2    ALLERGIES:   No Known Allergies    SOCIAL HISTORY:       Social History     Tobacco Use   • Smoking status: Every Day     Packs/day: 1.00     Years: 25.00     Pack years: 25.00     Types: Electronic Cigarette, Cigarettes     Passive exposure: Current   • Smokeless tobacco: Never   • Tobacco comments:     1 PPD   Vaping Use   • Vaping Use: Some days   • Substances: Nicotine   • Devices: Disposable   • Passive vaping exposure: Yes   Substance Use Topics   • Alcohol use: Not Currently     Comment: RARELY   • Drug use: Never         FAMILY HISTORY:   · Mother has positive factor V Leiden mutation, although she did not have thrombosis, mother also is coronary disease with stenting, she also is occasional bruising.    · Maternal grandmother had DVT, she had multiple surgical procedures.    · Patient mother's half-brother had metastatic colon cancer at diagnosis in his 50s.    · Maternal great aunt had breast cancer.           Vitals:    04/19/23 1630   BP: 102/67   Pulse: 78   Resp: 16   Temp: 97.8 °F (36.6 °C)   TempSrc: Temporal   SpO2: 97%   Weight: 91.6 kg (202 lb)   Height: 167 cm (65.75\")   PainSc: 0-No pain         4/19/2023     4:31 PM   Current Status   ECOG score 0     Physical Exam  Vitals reviewed.   Constitutional:       Appearance: Normal appearance. She is well-developed.   HENT:      Head: Normocephalic and atraumatic.      Comments:         Nose: Nose normal.   Eyes:      Conjunctiva/sclera: Conjunctivae normal.   Cardiovascular:      Rate and Rhythm: Normal rate and regular rhythm.      Heart sounds: Normal heart sounds. No murmur heard.  Pulmonary:      Effort: Pulmonary effort is normal. No respiratory distress.      Breath sounds: Normal breath sounds.   Abdominal:      General: Bowel sounds are normal. There is no distension.      Palpations: Abdomen is soft.      Tenderness: There is no abdominal tenderness.   Musculoskeletal:      Right lower leg: No edema.      Left lower leg: " No edema.   Lymphadenopathy:      Cervical: No cervical adenopathy.   Skin:     General: Skin is warm and dry.   Neurological:      Mental Status: She is alert and oriented to person, place, and time. Mental status is at baseline.   Psychiatric:         Mood and Affect: Mood normal.         Thought Content: Thought content normal.         RECENT LABS:    Lab Results   Component Value Date    WBC 5.12 04/11/2023    HGB 13.9 04/11/2023    HCT 42.2 04/11/2023    MCV 91.5 04/11/2023     04/11/2023     Lab Results   Component Value Date    GLUCOSE 93 04/11/2023    BUN 6 04/11/2023    CREATININE 0.63 04/11/2023    EGFR 113.0 04/11/2023    BCR 9.5 04/11/2023    K 4.1 04/11/2023    CO2 28.0 04/11/2023    CALCIUM 8.8 04/11/2023    ALBUMIN 3.6 04/11/2023    BILITOT 0.2 04/11/2023    AST 16 04/11/2023    ALT 15 04/11/2023     Lab Results   Component Value Date    CEA 1.00 04/11/2023                        IMAGING:  CT Chest With Contrast Diagnostic, CT Abdomen Pelvis With Contrast  Narrative: CT CHEST, ABDOMEN, AND PELVIS WITH IV CONTRAST     HISTORY: Restaging rectal cancer. Status post laparoscopic LAR with  diverting loop ileostomy. Takedown on 11/14/2022. Right lower quadrant  wound check.      TECHNIQUE: Radiation dose reduction techniques were utilized, including  automated exposure control and exposure modulation based on body size.   3 mm images were obtained through the chest, abdomen, and pelvis after  the administration of IV contrast.      COMPARISON: 01/10/2013, 09/13/2022, 06/21/2022     FINDINGS:     Chest:      Thoracic inlet: Stable hypodensity in the left thyroid lobe. Multiple  collaterals in the chest wall. Port right chest wall with the catheter  terminating at the cavoatrial junction.     Heart and great vessels: The heart size is stable. No significant  pericardial effusion. 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           Lymphatics: Multiple subcentimeter  mediastinal nodes and index  precarinal node is 7 mm in short axis.     Lung parenchyma and pleural space: Patent central airway. Multiple  bilateral subcentimeter pulmonary nodules with index nodules as follows:  6 mm right upper lobe (series 3 image 31), 5 mm left upper lobe (image  37) previously 4 mm, 5 mm left lower lobe (image 38) previously 4 mm.  Bibasilar atelectasis and/or scarring.           ABDOMEN:  Liver/Biliary Tract: Hepatic steatosis which limits evaluation for  underlying liver lesions. Cholecystectomy.     Spleen: Within normal limits.     Pancreas: Within normal limits.     Adrenals: Within normal limits.     Kidneys:  Within normal limits.     Bowel:  No obstruction. Status post low anterior resection with  ileostomy takedown and reanastomosis with less stranding in the  subjacent mesentery favoring evolving postsurgical change. Recommend  correlation with tumor markers/endoscopy.     Peritoneum: No free fluid or free air.     Vasculature:    Multiple subcutaneous soft tissue collaterals. Scattered  calcific atherosclerosis abdominal aorta and branch vessels.  Circumaortic left renal vein.     Lymph Nodes:  Scattered subcentimeter nodes.                                 PELVIS:                                   Pelvic organs: The bladder is incompletely distended. Uterus in situ.  Presacral soft tissue thickening approximating 2.2 cm in greatest AP  dimension similar to the prior.        Abdominal/Pelvic Wall: Healing right lower quadrant skin defect with  thickening and induration of the subcutaneous soft tissues at the site  of ostomy takedown. No focal fluid collections. There is a tiny focus of  air with surrounding subjacent induration or fluid in the right  periumbilical region likely the sequela of prior injection sites with  multiple additional subcutaneous soft tissue inflammatory changes and/or  areas of fat necrosis. No new focal fluid collections..     BONES: Multilevel degenerative  changes. Sclerotic foci in the pelvis  similar to the prior.     Impression: 1.  Pulmonary metastases are probably stable a few measures slightly  larger. Continued conservative surveillance recommended.  2.  Healing right lower quadrant subcutaneous soft tissue edema.  3.  Please see above for additional findings/recommendations.     This report was finalized on 4/11/2023 4:01 PM by Dr. Jose Anthony M.D.         ASSESSMENT:   1.  Metastatic rectal cancer.   · Initial rectal ultrasound examination reported T3N0 disease without lymphadenopathy.   · CT scan of chest, abdomen and pelvis reported no lymphadenopathy in the abdomen, pelvis or chest. She does have small pulmonary nodule 6-7 mm and 2 mm with indeterminate feature. There is no solid evidence of metastatic disease otherwise.   · Based on the CT scan and rectal ultrasound imaging studies, this patient had stage IIA (T3N0M0) disease.   · She had PET scan examination on 8/8/2019 which reported a hypermetabolic right upper lobe pulmonary nodule 6 mm with SUV 5.6.  This is suspicious for metastatic disease however it is too small to be biopsied.  This patient may have stage IV disease.   · She initiated concurrent radiation with continuous 5-FU on 8/12/2019.  · Patient finished concurrent chemoradiation therapy.  · Patient underwent surgical resection of the rectal tumor and diverting loop ileostomy on 12/2/2019 with Dr. Ye.  Pathology evaluation reported residual T3 disease, with 1 out of 14 lymph nodes positive for malignancy.  Certainly this patient has at least stage IIIb rectal cancer (T3N1M0?)  · On 1/22/2020 adjuvant chemotherapy FOLFOX 6 regimen initiated.   · On 2/5/2022 cycle #2 was delayed secondary to neutropenia.  She was given 3 days of Granix.   · Emend added with cycle 3.  With improved nausea control  · Continuing home Granix x3 days following 5-FU disconnect  · 3/11/2020 due for cycle 4, however, she is experiencing progressive abdominal pain  and occasional fevers.   · CT scan performed on 3/13/2020 reveals no evidence of progressive or recurrent disease.  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  · Patient hospitalized 4/24-4/26/20 after cycle 5 of chemotherapy with acute UTI.  CT abdomen/pelvis noting fluid collection in the presacral region having diminished in size compared to CT dated 3/13/2020.  Patient was evaluated by Dr. Ye while in the hospital with further plans to evaluate possible colovaginal fistula following completion of chemotherapy.  Patient did respond to IV antibiotics and discharged home on oral cefdinir.  · 4/29/2020 cycle 6 of FOLFOX.  Urinary symptoms have resolved   · Patient seen in the [facility] ED on 5/2/2020 for what was suspected to be an allergic reaction.  She called our service on Saturday explaining that she was experiencing hand and face swelling.  She was instructed to proceed to the emergency department at which time she was assessed and prescribed a Medrol Dosepak.  She had just completed her 5-FU and was unhooked on Friday, 5/1/2020.  Her symptoms have resolved in the office on assessment, 5/5/2020.  · The patient had grade 3 hand-foot syndrome based on symptomology.  Patient had cycle #8 of 5-FU on 5/13/2020. Due to her symptoms and poor tolerance to the 5-FU, her treatment dose will be reduced 50% for oxaliplatin and infusional 5-FU.  Bolus 5-FU will be discontinued..  · On 5/21/2020 patient had a PET scan and it showed no evidence of of metastatic disease in the neck, chest, abdomen or pelvis.  There was fluid accumulation/abscess.   · On 5/27/2020 discussed with the patient that we can discontinue chemotherapy at this time.  She will follow-up with Dr. Ye to discuss a possibility to reverse the ileostomy.  We likely will obtain anther PET scan in 3 to 4 months for reassessment.    · Patient was seen by Dr. Ye on 6/19/2019 for evaluation and to discuss possible take down of her ileostomy.  She is  scheduled to have a gastrografin enema on 7/2/2020 to evaluate for a fistula, and then a colonoscopy on 7/15/2020, both done by Dr. Ye.  She states that based on the above imaging and procedure findings, she may have a more extensive surgery done or just have her ileostomy reversed, which would likely be done in August 2020.  This will all be coordinated under the care of Dr. Ye.     · CT scan from 09/09/2020 and also compared to her previous PET scan examination from 05/21/2020 and also the original PET scan from 08/09/2019.  The original PET scan there is a small right upper lobe pulmonary nodule 6 mm with SUV 5.6. So in the 05/2020 PET scan the nodule is still there but activity seems much less and this most recent CT scan examination also documented the preexisting small pulmonary nodule however there is a new left lower lobe pulmonary nodule 8 mm in size and I shared with the patient today this nodule is suspicious for metastatic disease. Laboratory studies reported minimal CEA level 1.32 on 09/09/2020. Liver function panel was only marginally abnormal with the ALT 45, the remaining is normal. However laboratory study today showed worsening AST 55, ALT 95 and alkaline phosphatase 143 but is still normal, total bilirubin 0.3.   · Recommended to have repeat PET scan examination for assessment because the left lower lobe pulmonary nodule is too small to be biopsied, and if the PET scan reports hypermetabolic lesion, I recommended to have stereotactic radiation therapy. Explained to patient that she is not a really good candidate to have thoracotomy for resection because she still has unhealed wound in the abdomen. Stereotactic radiation therapy will likely be a more feasible choice.   · PET scan examination obtained on 9/18/2020 showed metastatic disease.  It reported a few hypermetabolic pulmonary nodules, and some new small pulmonary nodules, all of them highly suspicious for metastatic disease.  She also  has hypermetabolic activity in the abdomen and pelvic area which could be related to scar tissue from her recent surgery versus metastatic disease.  Nevertheless overall picture fits with metastatic cancer.  · Discussed with the patient on 9/20/2020, we reviewed the PET scan images together, and I recommended to have systematic chemotherapy, I do not think stereotactic reading therapy to the hypermetabolic lesions in the lungs warranted at this time because even those will be treated with reading therapy, she will still need systematic chemotherapy.  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  I did discuss with the patient using anti-EGFR monoclonal antibody versus anti-VEGF monoclonal antibody.     · Palliative chemotherapy cycle#1 FOLFIRI was started on 10/13/2020.  No bolus 5-FU given considering previous poor tolerance.    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  Microsatellite stable, mutation burden 5/Mb.   · Discussed with the patient 10/27/2020, because of the K-s[place]ia mutation, she is not a candidate for anti-EGFR monoclonal antibody such as Erbitux or vectibix.  She could be a candidate for anti-VEGF monoclonal antibody, however because of active wound, she is not a candidate right now at this moment.  · Patient seen in the ED for acute right neck pain on 11/16/2020.  CT angiogram as well as CT of the cervical spine performed with no acute findings but notably one of her pulmonary nodules, left upper lobe, somewhat decreased in size.  Patient given prednisone pack.  Further details below.  · Cycle #6 chemotherapy will be resumed on 1/5/2021.  Started back on original irinotecan.  · PET scan examination obtained on 1/12/2021 after cycle #6 chemotherapy reported improved pulmonary nodule hypermetabolic activity.  Confirmed these are metastatic lesions.  · Patient is evaluated 1/19/2020, will continue cycle #7 FOLFIRI chemotherapy.  However  Avastin will be on hold see next section.   · Patient was reevaluated on 2/2/2021, will continue chemotherapy cycle #8 FOLFIRI.  Avastin / biosimilar will be added.    · She talked to [facility] specialist in mid February 2021, and had recommended palliative chemotherapy without surgical intervention of metastatic lung lesions.   · On 3/2/2021, patient will proceed with cycle #10 FOLFIRI plus bevacizumab.  After 12 cycles, plan to start maintenance treatment.  · 3/16/2021 cycle 11.  Plus bevacizumab.  · 3/30/2021 cycle 12 FOLFIRI plus bevacizumab.  Having issues with worsening nausea despite premeds with dexamethasone, Aloxi, Emend, and Zofran at home.  Patient is requesting a dose of Phenergan, which is helped her in the past.  We will give this to her with treatment today.  · PET scan examination on 4/6/2021 reported no evidence of hypermetabolic metastatic lesion.  · Discussed with the patient on 4/13/2021, we reviewed the PET scan results.  We recommended to switch to maintenance chemotherapy without irinotecan.  We will continue 5-FU and Avastin every 2 weeks.  We discussed possibility of switching to oral Xeloda treatment.  Discussed with the patient for side effects more so with hand-foot syndrome.  Patient is agreeable.   · Xeloda 2000 mg twice daily 7 days on, 7 days off along with Avastin initiated 4/27/2021.  · After only 5 doses of Xeloda significant hand-foot syndrome, Xeloda held. Patient requesting to be transitioned back to infusional 5-FU, as she felt that she tolerated infusional 5-FU without any problem.  The patient will receive 5-FU leucovorin and Avastin every 2 weeks.  Xeloda discontinued.  · 5/25/2021 continued cycle #4 maintenance 5-FU, leucovorin, Avastin tolerating this much better so far, we will continue this. Recommended to have 12 cycles of maintenance chemotherapy, then obtain PET scan for reevaluation.  We could discuss further treatment plan at that  time.  · 9/14/2021 patient due for cycle 12 of additional maintenance 5-FU/leucovorin plus Avastin.  She continues to tolerate treatment well overall.  She is anxious in the office today with her Mediport not getting blood at first and also with upcoming scans being heavy on her mind.  She was instructed to take one of her Klonopin pills while here to help calm her mind.  Heart rate did improve from 140s down to 112.  Proceed with treatment today as scheduled.  · PET scan examination on 9/24/2021 reported further shrinking of the right upper lobe tiny pulmonary nodule.  No other new lesions.  · Discussed with patient on 9/24/2021 about further shrinking of the right upper lobe pulmonary nodule and no evidence for other new lesions. We recommended to have chemo break for 3 months with repeated CT scan for reevaluation.  · Recent CT scan for chest 12/14/2021 and abdomen CT from 11/29/2021 reported no evidence for active disease. The right upper lobe pulmonary nodule, left lower lobe pulmonary nodule minimal about 4 mm and stable. I discussed with patient today on 12/21/2021, recommended to give her another 3 months chemotherapy holiday and obtain CT scan afterwards for reevaluation. After discussion, patient is agreeable.   · CT scan examination for chest abdomen and pelvis obtained on 3/15/2022 reported a slight increase of the left upper lobe pulmonary nodule 5 mm, from previous 3 mm.  The other pulmonary nodules were stable in size.  There is also small left upper lobe groundglass changes.   · I reviewed images studies with the patient today on 3/23/2022, compared to multiple previous images including CT chest CT and PET scan, suspected disease progression.  Discussed with the patient, and I recommended to resume maintenance chemotherapy with 5-FU plus leucovorin plus Avastin repeating every 2 weeks, and obtaining CT scan for reassessment in 3 months.  Patient is agreeable.  · Patient resumed maintenance  chemotherapy on 3/29/2022 with 5-FU leucovorin and Avastin.   · 4/26/2022, proceed with cycle #27 maintenance 5-FU, leucovorin, and Avastin.  Patient continues to tolerate treatment well.    · On 5/10/2022, we will proceed ahead with cycle #28 maintenance chemotherapy.  Plan to have CT scan after cycle #30.  · 5/24/2022 cycle 29 maintenance chemotherapy.  Continue to tolerate well.  · 6/7/2022 cycle #30 maintenance chemotherapy, continuing to tolerate well.   · CT scan for chest abdomen pelvis with IV contrast obtained on 6/21/2022 reported sub-6 mm pulmonary nodules either stable or slightly smaller.  We reviewed the images with the patient together.  We discussed with the patient and recommended to hold chemotherapy for now with repeating CT scan in 3 months for reassessment.  · CT scan of the chest abdomen pelvis 9/13/2022 reported stable condition, no evidence for recurrent or metastatic colon cancer.  · On 1/10/2023 patient had a CT chest abdomen pelvis with reported no evidence for disease progression.  Laboratory study also showed normal CEA.   · Patient had a CT scan for chest, abdomen, and pelvis obtained on 04/11/2023. This study reported very small pulmonary nodules, the right upper lobe nodule is stable to 6 mm, however, the left upper lobe and the left lower lobe nodule increased to 5 mm from previous 4 mm. I discussed with the patient today on 04/19/2023, I recommend to obtain another CT scan in 3 months for reassessment. The nodules are so small, they likely will not be picked up by PET scan examination.      2.   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  Low-dose Lovenox discontinued and initiated Xarelto 20 mg daily.  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  · Patient held Xarelto for 2 days prior to MediPort removal from the left chest wall and placement of new port in the right chest wall on 12/18/2020.  She resumed Xarelto that evening.  · Progressive swelling in the neck, face, upper extremities prompting hospitalization with CT scan showing thrombosis in the right subclavian vein and SVC.  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  Although she had been off of Xarelto for a few days, clearly Xarelto was not prohibiting progression of her thrombosis after she resumed treatment and there was some evidence to suggest thrombosis had been present at least in the innominate vein on prior scan in September but now appears chronic.  Current acute thrombosis involving SVC and right subclavian.  · Patient was admitted and placed on heparin drip  · Patient was evaluated by vascular surgery and intervention was not recommended for thrombolysis/thrombectomy.  · On 12/22/2020, the patient developed worsening shortness of breath, increasing upper extremity edema consistent with worsening SVC syndrome.  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  · Symptoms improved and patient was discharged 12/24/2020 on Lovenox 100 mg every 12 hours.    · Returns 12/29/2020 for evaluation continuing Lovenox, overall tolerating it well.  No bleeding issues.  · Improved edema 1/5/2021.  Will continue Lovenox weight-based every 12 hours.  · On 1/19/2021 and 2/2/2021, patient reports improved facial swelling.  She however does have  being bruise on her abdomen wall where she does Lovenox injection.  However she does not have a spontaneous epistaxis, gum bleeding or other bleeding.   · On 2/2/2021, we discussed that side effects of Avastin/biosimilar related to thrombosis.  Since this patient already has been on Lovenox, I think the benefits from treatment for her cancer will outweigh that risk of thrombosis, will proceed ahead with Avastin.  I cautioned patient to watch out for signs of worsening thrombosis patient voiced understanding.   · On 3/16/2021, no evidence of worsening DVT, continue Lovenox.  · 5/11/2021 Lovenox dosing will be adjusted due to patient's weight loss.  We discussed she needs 1 mg/kg.  Her syringes are 100 mg syringes she will do 0.9 mL subcu every 12 hours.  · 8/3/2021 Patient continues to have bruising on abdomen from lovenox injections.  Will need to monitor weight and adjust dosing in future if further weight loss.   · Stable condition on 9/28/2021.  Continue Lovenox anticoagulation.    · Patient reports no chest pain no dyspnea no leg edema. However she had bruising and also most recently abnormal small abscess for which she actually went to ER on 11/29/2021, had I&D. The wound is healing. No further drainage. Denies fever sweating or chills. After discussion, she will continue Lovenox injection for now.   · On 3/23/2022, patient reports good tolerance of Lovenox.  Nevertheless she still has small quantity of pus in the left lower abdomen abscess, she squeezes it every day to prevent it became worse.  She reports no fever sweating chills.  Recommended to start Augmentin 875 mg twice a day for 7 days.  She will continue Lovenox indefinitely.  · On 4/12/2022, patient reports resolution of the small abscess at left lower abdominal wall.   · On 5/10/2022, patient continues on Lovenox indefinitely.  No easy bleeding or bruising.  · 6/23/2022 continuing on Lovenox 1 mg/kg at a dose of 100 mg (patient weight is 96.6 kg  today) every 12 hours.  · On 1/17/2023, patient reports good tolerance, Lovenox will be continued.    · Patient had a venous Doppler study on 02/05/2023, which reported acute left upper extremity DVT in the subclavian vein, axillary and the brachial vein, and also superficial thrombophlebitis in the basilic upper arm.   · On 04/19/2023, patient reports that she was not compliant with anticoagulation at the time of recurrent DVT. She now is fully compliant and is using Lovenox.    3.  This patient also has strong family history for malignancy   · The patient had appointment with genetic counseling on September 3, 2019.  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    4.  Mild anemia.   · She also has history of iron deficiency.  Iron studies reveal iron saturation of 10%.  She was started on oral iron but was unable to tolerate due to GI side effects.   · Status post Injectafer 2/5/2020 and 2/12/2020   · Improved hemoglobin 13.5 on 1/5/2021.  · 3/16/2020 when hemoglobin now up to 16.2, hematocrit 47.6.  Patient admits that she has started smoking again, and I have encouraged her to quit.  She denies any snoring or sleep apnea diagnosis.  Patient is not taking oral iron.  We will closely monitor.  · 3/30/2020 hemoglobin 15.7, HCT 47.5.  Patient states that she has cut back significantly on her smoking, although not quit yet.  I have encouraged her to continue working on this.  We will continue to closely monitor.  · Hemoglobin 14.6 on 6/8/2021 at the meantime he has worsening macrocytosis .6.    · Laboratory studies 6/22/2021 reported excellent folate > 20 ng/mL, however low normal B12 level 357 pg/mL.    · On 7/6/2021, normal hemoglobin 15.8, .9 and HCT 47.2%.  Patient was asked to start oral vitamin B12 at 1000 mcg daily.   · 9/14/2021 hemoglobin 17.0, hematocrit 52.1.  Monitor.  · On 9/28/2021 hemoglobin 16.1 hematocrit 48.5%, continue to monitor.   · Lab study on 12/14/2021 reported excellent ferritin  296 and iron saturation 25% with free iron 104 TIBC 424. Hemoglobin was 15.2 with .2.   · Normal hemoglobin 14.6 on 3/15/2022.  Iron study reported normal ferritin 129.5 ng/mL however slightly low iron saturation 11%.  Continue to monitor for now.  · 4/26/2022, hemoglobin 14.8.  · 6/7/2022 hemoglobin 15.5.  · On 9/20/2022 Hb 15.7, .1.  · On 1/10/2023 normal hemoglobin 14.7 MCV 95.0.  · Laboratory study on 04/11/2023 reported normal hemoglobin 13.9.    5.  Peripheral neuropathy secondary to chemotherapy.   · This is mild involving bilateral hands and feet as reported on 9/16/2020.   · Will avoid chemotherapy with oxaliplatin.  · Stable mild neuropathy as of 11/10/2020  · Patient reports worsening peripheral neuropathy and cycle #5 chemotherapy on 12/8/2020 with and without irinotecan.   · On 1/5/2021, patient reports improvement of peripheral neuropathy, will add irinotecan back to chemotherapy.  Continue to monitor and adjust medication.  · On 3/2/2021, patient reports no worsening of peripheral neuropathy after restarting irinotecan.   · This remains stable, may be slightly better as reported on 3/23/2022..   · No worsening since resuming chemotherapy on 3/29/2022.  · On 6/23/2022 peripheral neuropathy remains stable.  · No worsening peripheral neuropathy today.     6.  History of hand-foot syndrome grade 3.    · It become so significant after cycle #7 FOLFOX treatment.  Discussed with patient on 5/13/2020, will discontinue the bolus 5-FU dose, and also decrease 50% of the infusion dose through the pump.   · We also use Medrol Dosepak for possible recurrent symptomology.  She responded this well.   · Her hand-foot syndrome improved.  · Under current treatment with FOLFIRI, patient not receiving 5-FU bolus.  No recurrent issues.   · Patient will be switched to maintenance chemotherapy using Xeloda in late April 2021.  · Following 5 doses of Xeloda, significant hand-foot syndrome with pain in the feet,  significant erythema.  Xeloda held.  Will be further discussed with Dr. Nguyen to determine if the patient will resume 5-FU versus a dose reduction to Xeloda.  · Aggressive emollient moisturizer use twice daily for the past week and patient reports improvement in the dryness and erythema.  Xeloda will now be discontinued and patient will switch back to 5-FU.  · As of 5/25/2021 dryness and erythema/hyperpigmentation continues to improve.  Xeloda has been discontinued.  · 6/8/2021, patient reports much improved hand-foot syndrome, with remnant pigmentation on palms.  · On 7/6/2021, patient reports and had a some flareup of hand-foot syndrome, however improved after aggressive moisturizing cream and the urea cream.  Overall much tolerated infusional 5-FU compared to Xeloda.    · 7/20/2021 continues to have mild hyperpigmentation of her hands and feet bilaterally, she continues aggressive moisturization with utter balm with urea.  She also reports some soreness of her tailbone, and we discussed that this is likely due to loss of weight and muscle mass.  I advised her to go ahead and apply moisturizer to this area as there is one tiny fissure.  Also recommended using a waffle pad or doughnut to sit on.  · 8/3/2021 patient reports her hand foot symptoms are stable and without worsening.  Continue to monitor.  · Symptoms stable, typically flaring about 24 hours after she is unhooked from her 5-FU infusional ball and then settling down with some mild peeling.   · Since off chemotherapy no specific complaints.   · No recurrent symptoms after resuming chemotherapy on 3/29/2022.  · 5/24/2022 does report some mild symptoms about 24 hours after disconnect maintenance chemotherapy.  She is using utterly smooth twice a day.  · On 9/20/2022 patient reports mild peripheral neuropathy.  · Stable condition today..      7.  Depression.  Patient seen by JULIET Davenport on 11/9/2020.  She was started on mirtazapine.  Lexapro was  discontinued.  This has definitely improved her mood with the patient feeling overall much better.  She is sleeping better.  Appetite is improved with her actually eating more than she wants to.    · Condition is stable.  She plans to continue follow-up with supportive oncology.          PLAN:    1. We will plan to obtain CT scan for chest, abdomen, and pelvis with IV and oral contrast in 3 months for reassessment.  2. Continue port flush every 6 weeks.  3. Continue self injection of Lovenox at 1 mg/kg twice daily.   4. Patient will continue follow up with her surgeon, Dr. Ye.  5. Continue oral B12 1000 mcg daily.  6. Patient will come back to see me in 3 months, 1 week after the CT scan and also laboratory study with CBC, CMP, CEA level for reevaluation.  7. Call/return sooner should she develop any new concerns or problems.        Discussed with the patient today, I reviewed the CT scan images of the chest from 04/11/2023 and also CT scan from 01/10/2023 and 09/13/2022. Patient is agreeable with this recommendation.    I discussed with the patient about laboratory results and further management plan.  Patient voiced understanding and agreeable.    I spent 40 minutes caring for Carole on this date of service. This time includes time spent by me in the following activities: preparing for the visit, reviewing tests, obtaining and/or reviewing a separately obtained history, performing a medically appropriate examination and/or evaluation, counseling and educating the patient/family/caregiver, ordering medications, tests, or procedures, referring and communicating with other health care professionals, documenting information in the medical record, independently interpreting results and communicating that information with the patient/family/caregiver and care coordination         Dong Nguyen MD PhD      CC:  Deepika Vela III NP-C   Padmaja Ye MD        Transcribed from ambient dictation for  "Dong Nguyen MD PhD by Mojgan Tineo.  04/19/23   17:52 EDT    [facility] Provider Statement:[id number]::\"Patient or patient representative verbalized consent to the visit recording.\",\"I have personally performed the services described in this document as transcribed by the above individual, and it is both accurate and complete.\"      "

## 2023-04-20 ENCOUNTER — OFFICE VISIT (OUTPATIENT)
Dept: SURGERY | Facility: CLINIC | Age: 43
End: 2023-04-20
Payer: COMMERCIAL

## 2023-04-20 VITALS
OXYGEN SATURATION: 97 % | WEIGHT: 204 LBS | HEIGHT: 66 IN | TEMPERATURE: 97.2 F | DIASTOLIC BLOOD PRESSURE: 72 MMHG | SYSTOLIC BLOOD PRESSURE: 90 MMHG | HEART RATE: 87 BPM | BODY MASS INDEX: 32.78 KG/M2

## 2023-04-20 DIAGNOSIS — Z85.048 HISTORY OF RECTAL CANCER: Primary | ICD-10-CM

## 2023-04-20 PROCEDURE — 99024 POSTOP FOLLOW-UP VISIT: CPT | Performed by: PHYSICIAN ASSISTANT

## 2023-04-20 NOTE — PROGRESS NOTES
"Carole Weaver is a 42 y.o. female in for follow up of History of rectal cancer    Pt with pT3N1 rectal cancer stage IIIb is S/P laparoscopic LAR with diverting loop ileostomy on 12/02/2019 and S/P Ileostomy takedown 11/14/2022.   She presents today for a RLQ wound check.   Pt states that the wound has completely healed and denies any concerns at this time.     BP 90/72 (BP Location: Left arm, Patient Position: Sitting, Cuff Size: Large Adult)   Pulse 87   Temp 97.2 °F (36.2 °C) (Temporal)   Ht 167.6 cm (66\")   Wt 92.5 kg (204 lb)   LMP  (LMP Unknown)   SpO2 97%   Breastfeeding No   BMI 32.93 kg/m²   Body mass index is 32.93 kg/m².      PE:  Physical Exam  Constitutional:       General: She is not in acute distress.     Appearance: She is well-developed.   HENT:      Head: Normocephalic and atraumatic.   Abdominal:      General: There is no distension.      Palpations: Abdomen is soft.      Comments: Abdomen soft, non-distended. Scar in RLQ from previous ostomy site.    Musculoskeletal:         General: Normal range of motion.   Neurological:      Mental Status: She is alert.   Psychiatric:         Thought Content: Thought content normal.         Review of Medical Record:     Colonoscopy 11/03/2022:  - Friability with contact bleeding throughout entire examines colon  - Patent functional end-to-end colo-rectal anastomosis  - Dr. Padmaja Ye, [facility]     Assessment:   1. History of rectal cancer       Plan:  - RLQ wound completely healed  - Follow up with Dr. Ye in 4 weeks.            "

## 2023-05-10 ENCOUNTER — OFFICE VISIT (OUTPATIENT)
Dept: CARDIOLOGY | Facility: CLINIC | Age: 43
End: 2023-05-10
Payer: COMMERCIAL

## 2023-05-10 VITALS
WEIGHT: 205 LBS | SYSTOLIC BLOOD PRESSURE: 114 MMHG | DIASTOLIC BLOOD PRESSURE: 76 MMHG | OXYGEN SATURATION: 98 % | HEIGHT: 66 IN | BODY MASS INDEX: 32.95 KG/M2 | HEART RATE: 76 BPM

## 2023-05-10 DIAGNOSIS — Z79.01 CHRONIC ANTICOAGULATION: Chronic | ICD-10-CM

## 2023-05-10 DIAGNOSIS — I26.99 OTHER PULMONARY EMBOLISM WITHOUT ACUTE COR PULMONALE, UNSPECIFIED CHRONICITY: ICD-10-CM

## 2023-05-10 DIAGNOSIS — C78.02 MALIGNANT NEOPLASM METASTATIC TO BOTH LUNGS: ICD-10-CM

## 2023-05-10 DIAGNOSIS — I82.210 THROMBOSIS OF SUPERIOR VENA CAVA: ICD-10-CM

## 2023-05-10 DIAGNOSIS — D68.51 HETEROZYGOUS FACTOR V LEIDEN MUTATION: Chronic | ICD-10-CM

## 2023-05-10 DIAGNOSIS — C78.01 MALIGNANT NEOPLASM METASTATIC TO BOTH LUNGS: ICD-10-CM

## 2023-05-10 DIAGNOSIS — C20 ADENOCARCINOMA OF RECTUM: Chronic | ICD-10-CM

## 2023-05-10 DIAGNOSIS — I10 PRIMARY HYPERTENSION: Primary | Chronic | ICD-10-CM

## 2023-05-10 RX ORDER — ERGOCALCIFEROL 1.25 MG/1
50000 CAPSULE ORAL WEEKLY
COMMUNITY

## 2023-05-10 NOTE — PROGRESS NOTES
"      [facility] CARDIOLOGY  [address] [facility] [address]  [facility] [address]  Phone: [phone]  Fax: [phone]  Patient Name: Carole Weaver  :1980  Age: 42 y.o.  Primary Cardiologist: Tasha Bustos MD  Encounter Provider:  JULIET Fleming    History of Present Illness     Carole Weaver is a 42 y.o.  female whose medical history includes rectal cancer s/p resection and FOLFOX, factor V Leiden mutation, and hypertension.  She is followed in our office by Dr. Bustos for hypertension and cardio oncology management. I have reviewed the past medical records in preparation of today's visit.     Follow-up:  She is here for 6-month follow-up and I am seeing her for the first time today.  She is doing okay.  Recent imaging at the [facility] group shows that she has 3 nodules that have increased slightly in size.  They are still very small; her insurance is not likely to cover PET scan.  They are also too small for resection.  The plan is for her to be scanned again in 3 months; she may require radiation.  For now she has remained off Avastin for over a year.  She reiterates that if she supposed to be started on chemotherapy she will call for repeat echocardiogram.  She denies chest pain, dyspnea with exertion, orthopnea, syncope, or palpitations.  She has rare leg swelling.  She is taking her medications as prescribed.    Data Review     The following data was reviewed by JULIET Fleming on 23:    Vital Signs:   /76 (BP Location: Left arm, Patient Position: Sitting)   Pulse 76   Ht 167.6 cm (66\")   Wt 93 kg (205 lb)   SpO2 98%   BMI 33.09 kg/m²       Weight:  Wt Readings from Last 3 Encounters:   05/10/23 93 kg (205 lb)   23 92.5 kg (204 lb)   23 91.6 kg (202 lb)     Body mass index is 33.09 kg/m².    Below is a summary of pertinent cardiology findings:  • She is , has 1 child, works as an  at a [employer]" office, smokes 1 pack of cigarettes per day, uses no drugs or alcohol; rarely has caffeine.  Family history includes her father with A-fib, and her mother with hypertension.  • She has heterozygous factor V Leiden mutation and has been on prophylactic Lovenox; she had 3 pulmonary emboli in November 2019 when she was on low-dose Lovenox; she was then placed on Xarelto.  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  Because of this she is now on weight-based Lovenox every 12 hours.  • She was diagnosed with primary rectal cancer which was surgically resected in December 2019; she has ileostomy and chronic diarrhea.  Ileostomy was reversed in November 2022.  She was started on FOLFOX 6 in January 2020 by Dr. Nguyen.  May 2020 PET scan showed no metastatic disease but a stable 6 mm right upper lung pulmonary nodule.  September 2020 a new 8 mm noncalcified pulmonary nodule in the left lower lung was noted.  Follow-up PET scan in September 2020 showed multiple hyperdynamic small pulmonary nodules; she was started on a FOLFIRI on October 2020.  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  Her maintenance chemotherapy is FOLFIRI and bevacizumab.  • December 2020 CTA reviewed by Dr. Bustos showed no coronary artery calcification.  • November 2021 echo showed EF 51 to 55%, GLS -21.7%, grade 1 LV diastolic dysfunction, and mild concentric hypertrophy; this was stable compared to echo in April 2021.  • March 2022 CT scan showed slight progression of small pulmonary nodule.  • May 2022 echo showed EF greater than 70%, normal GLS, no valvular abnormalities, and normal diastolic function.  • September 2022 CT chest shows stable pulmonary nodule, chronic stenosis of the SVC with multiple collaterals; upon review with Dr. Villanueva it shows normal left main coronary artery, calcification of the mid LAD, patent proximal RCA, patent proximal left circumflex, and no pericardial  effusion.    Labs:  • 04/11/2023:  cr 0.6, K 4.1, otherwise unremarkable CMP, Hgb 13.9, Plt 236      ECG 12 Lead    Date/Time: 5/10/2023 9:16 AM  Performed by: Ashley Fitzgerald APRN  Authorized by: Ashley Fitzgerald APRN   Comparison: compared with previous ECG from 10/19/2022  Similar to previous ECG  Comparison to previous ECG: No PVCs  Rhythm: sinus rhythm  Rate: normal  BPM: 76  T flattening: III and aVL  Other findings: T wave abnormality    Clinical impression: non-specific ECG            Medications     Allergies as of 05/10/2023   • (No Known Allergies)         Current Outpatient Medications:   •  clonazePAM (KlonoPIN) 1 MG tablet, TAKE 1 TABLET BY MOUTH THREE TIMES A DAY AS NEEDED FOR ANXIETY, Disp: 90 tablet, Rfl: 0  •  Cyanocobalamin (VITAMIN B-12 PO), Take 1 tablet by mouth Every Other Day., Disp: , Rfl:   •  dicyclomine (BENTYL) 20 MG tablet, Take 1 tablet by mouth Every 6 (Six) Hours As Needed (for irritable bowel symptoms). Indications: Irritable Bowel Syndrome, Disp: 360 tablet, Rfl: 2  •  diphenoxylate-atropine (Lomotil) 2.5-0.025 MG per tablet, Take 2 tablets by mouth 4 (Four) Times a Day., Disp: 240 tablet, Rfl: 11  •  Enoxaparin Sodium (LOVENOX) 100 MG/ML solution prefilled syringe syringe, Inject 1 mL under the skin into the appropriate area as directed Every 12 (Twelve) Hours., Disp: 60 mL, Rfl: 2  •  escitalopram (Lexapro) 10 MG tablet, Take 1 tablet by mouth Daily., Disp: 90 tablet, Rfl: 1  •  famotidine (PEPCID) 20 MG tablet, TAKE 1 TABLET BY MOUTH TWICE A DAY, Disp: 180 tablet, Rfl: 1  •  Loperamide HCl (IMODIUM PO), Take  by mouth., Disp: , Rfl:   •  Loratadine 10 MG capsule, Take 1 capsule by mouth Every Evening., Disp: , Rfl:   •  metoprolol succinate XL (TOPROL-XL) 25 MG 24 hr tablet, TAKE 0.5 TABLETS BY MOUTH EVERY NIGHT, Disp: 45 tablet, Rfl: 3  •  ondansetron (ZOFRAN) 8 MG tablet, TAKE 1 TABLET BY MOUTH THREE TIMES A DAY AS NEEDED FOR NAUSEA AND VOMITING, Disp:  60 tablet, Rfl: 1  •  promethazine (PHENERGAN) 25 MG tablet, Take 1 tablet by mouth Every 6 (Six) Hours As Needed for Nausea or Vomiting., Disp: 60 tablet, Rfl: 2  •  vitamin D (ERGOCALCIFEROL) 1.25 MG (66653 UT) capsule capsule, Take 1 capsule by mouth 1 (One) Time Per Week., Disp: , Rfl:      Past History, Review of Systems, Exam     Past Medical History:   Diagnosis Date   • Abdominopelvic abscess 08/12/2020   • Abnormal Pap smear of cervix 02/02/1998   • Abscess of abdominal wall 11/28/2012   • Anemia in pregnancy    • Anxiety    • Bilateral epiphora 04/2020   • Bilateral hand swelling 05/02/2020   • Cervical lymphadenitis 06/06/2012   • Chemotherapy induced diarrhea 01/2021   • Chemotherapy induced neutropenia 04/2020   • Chemotherapy-induced nausea 02/2020   • Chemotherapy-induced thrombocytopenia 05/2020   • Chronic anticoagulation    • Chronic diarrhea    • Colon polyps    • Coronary artery calcification    • COVID-19 06/09/2022   • Cystitis 04/24/2020   • Cystitis 10/27/2020   • Depression    • 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   • Drug-induced peripheral neuropathy 05/2020   • Factor V Leiden mutation    • Fistula of intestine    • Gallstones    • GERD (gastroesophageal reflux disease)    • Hand foot syndrome 05/2020   • Hearing loss    • Heart murmur    • Hematochezia    • Hemorrhoids    • Heterozygous factor V Leiden mutation    • History of chemotherapy 2019   • History of gestational diabetes    • History of pre-eclampsia 1998   • History of pre-eclampsia    • History of radiation therapy 2019   • History of TB skin testing 01/17/2009   • History of TB skin testing 1/17/2009   • History of transfusion 2019   • HPV in female 1998   • Hypokalemia 09/2019   • Hypomagnesemia 09/2019   • Hyponatremia 06/2021   • IBS (irritable bowel syndrome)    • Infected insect bite of neck 05/27/2016   • Kidney stones 08/09/2007   • Low anterior resection syndrome 12/2022   • Lump of right breast    • Lung cancer 09/28/2020   •  Macrocytosis 2021   • Monopolar depression    • On anticoagulant therapy    • Pain associated with surgical procedure 2020   • Palmar-plantar erythrodysesthesia 2021   • Perirectal abscess 2020   • Pulmonary embolism without acute cor pulmonale 2019   • Pulmonary nodule, right 2020   • Rectal cancer 2019   • Right shoulder pain 2020   • Right ureteral stone 10/01/2019   • SAB (spontaneous ) 1996   • Sciatica    • Sepsis due to cellulitis 2002   • Sepsis due to cellulitis 2002   • Tachycardia 2020   • Thrombosis of superior vena cava 2020   • Urinary urgency 2020       Past Surgical History:   has a past surgical history that includes Pap Smear (N/A, 2014); Esophagogastroduodenoscopy (N/A, 2019); Colonoscopy w/ polypectomy (N/A, 2019); Dilation and evacuation (N/A, ); Cholecystectomy (N/A, 10/10/2003); Vaginal delivery (N/A, 1998); transrectal ultrasound (N/A, 2019); Sigmoidoscopy (N/A, 2019); [place] tooth extraction (Bilateral, ); Venous Access Device (Port) (Left, 2019); Sigmoidoscopy (N/A, 2019); ureteroscopy laser lithotripsy with stent insertion (Right, 10/30/2019); Colonoscopy (N/A, 07/15/2020); Colectomy (N/A, 2019); Colectomy (N/A, 2020); Incision and drainage perirectal abscess (N/A, 2020); Venous Access Device (Port) (N/A, 2020); Abdominal surgery; Sigmoidoscopy (N/A, 2021); Colonoscopy (N/A, 2022); and Ileostomy closure (N/A, 2022).     Social History     Socioeconomic History   • Marital status:      Spouse name: Justus   • Number of children: 1   • Years of education: College   Tobacco Use   • Smoking status: Every Day     Packs/day: 1.00     Years: 25.00     Pack years: 25.00     Types: Electronic Cigarette, Cigarettes     Passive exposure: Current   • Smokeless tobacco: Never   • Tobacco comments:     1 PPD/caffeine use     Vaping Use   • Vaping Use: Some days   • Substances: Nicotine   • Devices: Disposable   • Passive vaping exposure: Yes   Substance and Sexual Activity   • Alcohol use: Not Currently     Comment: RARELY   • Drug use: Never   • Sexual activity: Yes     Partners: Male     Comment: .       Review of Systems   Cardiovascular: Positive for leg swelling. Negative for chest pain, claudication, cyanosis, dyspnea on exertion, irregular heartbeat, near-syncope, orthopnea, palpitations, paroxysmal nocturnal dyspnea and syncope.       Vitals reviewed.   Constitutional:       Appearance: Not in distress.   Eyes:      Conjunctiva/sclera: Conjunctivae normal.      Pupils: Pupils are equal, round, and reactive to light.   HENT:      Head: Normocephalic.      Nose: Nose normal.    Mouth/Throat:      Pharynx: Oropharynx is clear.   Neck:      Vascular: JVD normal.   Pulmonary:      Effort: Pulmonary effort is normal.      Breath sounds: Normal breath sounds. No wheezing. No rhonchi. No rales.   Cardiovascular:      Normal rate. Regular rhythm. Normal S1. Normal S2.      Murmurs: There is no murmur.   Pulses:     Intact distal pulses.   Edema:     Peripheral edema absent.   Abdominal:      General: Bowel sounds are normal. There is no distension.      Palpations: Abdomen is soft.      Tenderness: There is no abdominal tenderness.   Musculoskeletal: Normal range of motion.      Cervical back: Normal range of motion and neck supple. Skin:     General: Skin is warm and dry.   Neurological:      Mental Status: Alert and oriented to person, place and time.   Psychiatric:         Attention and Perception: Attention normal.         Mood and Affect: Mood normal.         Speech: Speech normal.         Behavior: Behavior is cooperative.          Assessment and Plan     Assessment:  1. Primary hypertension    2. Heterozygous factor V Leiden mutation    3. Chronic anticoagulation    4. Other pulmonary embolism without acute cor pulmonale,  unspecified chronicity    5. Thrombosis of superior vena cava    6. Adenocarcinoma of rectum (HCC)    7. Malignant neoplasm metastatic to both lungs         1. Hypertension: Controlled on 25 mg metoprolol succinate nightly.  2. Factor V Leiden: She has family history of factor V Leiden mutation and has had thrombus of multiple vessels.  She also had thrombus while on Xarelto.  She is on chronic weight-based Lovenox twice daily.  3. PE: She had history of PE while on Xarelto and is now on Lovenox.  4. Thrombosis of SVC: This occurred while she was on Xarelto and she is now on weight-based Lovenox.  5. Rectal cancer: She underwent surgical resection in December 2019 and had ileostomy which was eventually reversed.  She was started on FOLFOX 6 in January 2020.  September 2020 showed new pulmonary nodules and follow-up PET scan showed multiple hyperdynamic small pulmonary nodules and she was started on FOLFIRI.  She has been off chemotherapy since April 2022.  6. Metastasis to both lungs.  She has small pulmonary nodules in both lungs which are too small for resection and treatment at this time.  She follows with Dr. Nguyen who plans to monitor her with imaging every 3 months.  She may eventually require radiation.    Ms. Weaver is a patient who follows with Dr. Villanueva for cardio oncology management.  She is currently off chemotherapy; her last echo in January 2023 was stable.  She understands that if she is to resume chemotherapy she needs to notify the office and a baseline echo will be ordered at that time.    The bevacizumab (Avastin) is a recombinant antibody targeting VEGF receptor binding.  The fluorouracil as an antimetabolite.  These can increase risk of coronary artery disease, coronary artery spasm with angina, myocardial infarction and cardiomyopathy.    Return in about 6 months (around 11/10/2023) for Follow-up with Dr. Bustos.  Orders Placed This Encounter   Procedures   • ECG 12 Lead      No orders  of the defined types were placed in this encounter.        Thank you for the opportunity to participate in this patient's care.    JULIET Hand    This office note has been dictated.

## 2023-05-22 ENCOUNTER — OFFICE VISIT (OUTPATIENT)
Dept: SURGERY | Facility: CLINIC | Age: 43
End: 2023-05-22

## 2023-05-22 VITALS
DIASTOLIC BLOOD PRESSURE: 86 MMHG | OXYGEN SATURATION: 98 % | HEART RATE: 105 BPM | SYSTOLIC BLOOD PRESSURE: 118 MMHG | WEIGHT: 198 LBS | BODY MASS INDEX: 31.82 KG/M2 | HEIGHT: 66 IN

## 2023-05-22 DIAGNOSIS — R15.9 INCONTINENCE OF FECES WITH FECAL URGENCY: ICD-10-CM

## 2023-05-22 DIAGNOSIS — R15.2 INCONTINENCE OF FECES WITH FECAL URGENCY: ICD-10-CM

## 2023-05-22 DIAGNOSIS — Z85.048 HISTORY OF RECTAL CANCER: Primary | ICD-10-CM

## 2023-05-22 NOTE — PROGRESS NOTES
"Carole Weaver is a 42 y.o. female in for follow up of History of rectal cancer    Incontinence of feces with fecal urgency    The patient presents today for a follow-up of rectal cancer status post low anterior resection.     The patient will have good days, and then have two bad nights. She tries to space out her meals and not eat at all to avoid having diarrhea. She feels as if she is tortured. She goes nights without sleeping at all, from having to have a bowel movement. She will stay sitting in the toilet for long periods of time. She has discussed these concerns with her primary care provider as well. She has even tried sleeping in a recliner or laying on a certain side. She is terrified to eat anything, in freight of how she will feel. The patient ate very small meals on 05/18/2023 and 05/19/2023, and had diarrhea on 05/20/2023. She had diarrhea all day on 05/20/2023, and then had fully formed stools with brown water. She has had blood with doing enemas in the past. She states the enemas are uncomfortable and not painful at all. The patient would like to try physical therapy now; she feels ready now.       /86 (BP Location: Right arm, Patient Position: Sitting)   Pulse 105   Ht 167.6 cm (65.98\")   Wt 89.8 kg (198 lb)   SpO2 98%   BMI 31.97 kg/m²   Body mass index is 31.97 kg/m².      PE:  Physical Exam  Constitutional:       General: She is not in acute distress.     Appearance: She is well-developed.   HENT:      Head: Normocephalic and atraumatic.   Abdominal:      General: There is no distension.      Palpations: Abdomen is soft.   Musculoskeletal:         General: Normal range of motion.   Neurological:      Mental Status: She is alert.   Psychiatric:         Thought Content: Thought content normal.         Assessment:   1. History of rectal cancer    2. Incontinence of feces with fecal urgency         Plan:  -The patient will be referred to physical therapy. She will do enemas at home. "       Transcribed from ambient dictation for Padmaja Ye MD by Alma Gupta.  05/22/23   17:18 EDT    Patient or patient representative verbalized consent to the visit recording.  I have personally performed the services described in this document as transcribed by the above individual, and it is both accurate and complete.

## 2023-06-01 ENCOUNTER — INFUSION (OUTPATIENT)
Dept: ONCOLOGY | Facility: HOSPITAL | Age: 43
End: 2023-06-01

## 2023-06-01 DIAGNOSIS — Z45.2 ENCOUNTER FOR FITTING AND ADJUSTMENT OF VASCULAR CATHETER: Primary | ICD-10-CM

## 2023-06-01 PROCEDURE — 25010000002 HEPARIN LOCK FLUSH PER 10 UNITS: Performed by: INTERNAL MEDICINE

## 2023-06-01 PROCEDURE — 96523 IRRIG DRUG DELIVERY DEVICE: CPT

## 2023-06-01 RX ORDER — SODIUM CHLORIDE 0.9 % (FLUSH) 0.9 %
10 SYRINGE (ML) INJECTION AS NEEDED
OUTPATIENT
Start: 2023-06-01

## 2023-06-01 RX ORDER — HEPARIN SODIUM (PORCINE) LOCK FLUSH IV SOLN 100 UNIT/ML 100 UNIT/ML
500 SOLUTION INTRAVENOUS AS NEEDED
Status: DISCONTINUED | OUTPATIENT
Start: 2023-06-01 | End: 2023-06-01 | Stop reason: HOSPADM

## 2023-06-01 RX ORDER — SODIUM CHLORIDE 0.9 % (FLUSH) 0.9 %
10 SYRINGE (ML) INJECTION AS NEEDED
Status: DISCONTINUED | OUTPATIENT
Start: 2023-06-01 | End: 2023-06-01 | Stop reason: HOSPADM

## 2023-06-01 RX ORDER — HEPARIN SODIUM (PORCINE) LOCK FLUSH IV SOLN 100 UNIT/ML 100 UNIT/ML
500 SOLUTION INTRAVENOUS AS NEEDED
OUTPATIENT
Start: 2023-06-01

## 2023-06-01 RX ADMIN — Medication 500 UNITS: at 15:13

## 2023-06-01 RX ADMIN — Medication 10 ML: at 15:13

## 2023-06-02 DIAGNOSIS — C20 ADENOCARCINOMA OF RECTUM: Chronic | ICD-10-CM

## 2023-06-02 DIAGNOSIS — F41.9 ANXIETY: ICD-10-CM

## 2023-06-02 DIAGNOSIS — F33.9 MONOPOLAR DEPRESSION: ICD-10-CM

## 2023-06-02 RX ORDER — ONDANSETRON HYDROCHLORIDE 8 MG/1
TABLET, FILM COATED ORAL
Qty: 60 TABLET | Refills: 1 | Status: SHIPPED | OUTPATIENT
Start: 2023-06-02

## 2023-06-02 RX ORDER — ENOXAPARIN SODIUM 100 MG/ML
1 INJECTION SUBCUTANEOUS EVERY 12 HOURS SCHEDULED
Qty: 60 ML | Refills: 2 | Status: SHIPPED | OUTPATIENT
Start: 2023-06-02

## 2023-06-02 NOTE — TELEPHONE ENCOUNTER
Rx Refill Note  Requested Prescriptions     Pending Prescriptions Disp Refills   • clonazePAM (KlonoPIN) 1 MG tablet [Pharmacy Med Name: CLONAZEPAM 1 MG TABLET] 90 tablet 0     Sig: TAKE 1 TABLET BY MOUTH THREE TIMES A DAY AS NEEDED FOR ANXIETY   • escitalopram (LEXAPRO) 10 MG tablet [Pharmacy Med Name: ESCITALOPRAM 10 MG TABLET] 90 tablet 1     Sig: TAKE 1 TABLET BY MOUTH EVERY DAY      Last office visit with prescribing clinician: 1/17/2023   Last telemedicine visit with prescribing clinician: Visit date not found   Next office visit with prescribing clinician: 6/12/2023         Erika Thakur MA  06/02/23, 16:44 EDT

## 2023-06-05 RX ORDER — CLONAZEPAM 1 MG/1
TABLET ORAL
Qty: 90 TABLET | Refills: 0 | Status: SHIPPED | OUTPATIENT
Start: 2023-06-05

## 2023-06-05 RX ORDER — ESCITALOPRAM OXALATE 10 MG/1
TABLET ORAL
Qty: 90 TABLET | Refills: 1 | Status: SHIPPED | OUTPATIENT
Start: 2023-06-05

## 2023-06-12 ENCOUNTER — OFFICE VISIT (OUTPATIENT)
Dept: INTERNAL MEDICINE | Facility: CLINIC | Age: 43
End: 2023-06-12
Payer: COMMERCIAL

## 2023-06-12 VITALS
SYSTOLIC BLOOD PRESSURE: 126 MMHG | HEART RATE: 83 BPM | DIASTOLIC BLOOD PRESSURE: 70 MMHG | BODY MASS INDEX: 32.57 KG/M2 | HEIGHT: 65 IN | WEIGHT: 195.5 LBS | OXYGEN SATURATION: 98 %

## 2023-06-12 DIAGNOSIS — D68.51 HETEROZYGOUS FACTOR V LEIDEN MUTATION: Chronic | ICD-10-CM

## 2023-06-12 DIAGNOSIS — F33.9 MONOPOLAR DEPRESSION: Primary | Chronic | ICD-10-CM

## 2023-06-12 DIAGNOSIS — Z79.01 CHRONIC ANTICOAGULATION: Chronic | ICD-10-CM

## 2023-06-12 DIAGNOSIS — R91.8 PULMONARY NODULES: ICD-10-CM

## 2023-06-12 NOTE — ASSESSMENT & PLAN NOTE
She will have repeat scans in July.   ? PET scan     States considering radiation however given pulmonary nodules have not had bx, not clear they would proceed.

## 2023-06-12 NOTE — PATIENT INSTRUCTIONS
Summer is coming!   Health Information:     Stay hydrated when outside  If your urine starts to get darker, you are not drinking enough     Protect yourself from ticks and mosquitos  Here are the best ingredients to look for:  DEET (N,N-diethyl-m-toluamide or N,N-diethyl-3-methyl-benzamide)  Picaridin  Oil of lemon eucalyptus (p-menthane-3,8-diol or PMD)  Wear light-colored pants so you can spot ticks easier  If you use a permethrin product, ONLY apply to clothing    Practice Safe Sun!    Use sun screen SPF >50 daily, reapply regularly per directions on package  See dermatologist for skin check regularly  Protect your eyes with sunglasses with UV protection    Poison Ivy  If you are going into areas that may have poison ivy, prepare with a product like IvyX or other ivy blocker.  Wash your clothes and pets after being in an area with ivy when returning home. If you come in contact with poison ivy, try a product like Technu     Other things you should incorporate all year...     Diet:    Eat vegetables, fruits, whole grain, low-fat dairy, poultry, fish, beans, nontropical vegetable oils, and nuts, but avoid red meat (i.e., Mediterranean-style diet, DASH [Dietary Approaches to Stop Hypertension] diet).  Limit sugary drinks and sweets.  Limit saturated and trans fat to 5% to 6% of calories.  Limit sodium intake to 2,400 mg daily (about one teaspoon table salt [kosher/sea salt have less sodium per teaspoon]).  https://www.eatright.org/    Weight loss / Calorie Counting Apps:    Lose It!   MyFitness Pal   Works great when you try it with a partner/ friend. It takes about 15 minutes a day but studies show that this simple method of monitoring your intake can help you achieve goals as it keeps you accountable.  I often will ask patients to try these apps just to get an idea of how much sodium and how many carbohydrates you are taking in.   Exercise:   Engage in moderate-to-vigorous aerobic activity for at least 40  minutes (on average) three to four times each week.  Wearables:   Activity tracker   Step tracker: getting 7,500 steps daily can cut your cardiac risks by 44%   Bone Health:   [email or web address]  Routine weight bearing exercise      Please Note: Summer 2023 our office will be moving to our NEW LOCATION:   [address] [place] [address] [place]: [address]  Please verify location of your next appointment on [facility]hart  (moved is expected end of July or August)

## 2023-06-12 NOTE — PROGRESS NOTES
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     She and her family had URI and she has some lingering cough - states almost all gone but at times will feel she needs to cough to clear secretions.  No longer working and at home, not drinking water as she did at work.  Continues to smoke.  No SOA, fever or chills.  NP cough.     Factor V: 2/5/23: she had not been compliant with lovenox and developed left upper dvt.     Bowel urge and incomplete emptying:   Will start pelvic floor PT tomorrow.       I have reviewed patient's medical history, any new submitted information provided by patient or medical assistant and updated medical record.      Objective:      Physical Exam  Constitutional:       Appearance: Normal appearance.   Cardiovascular:      Rate and Rhythm: Normal rate and regular rhythm.      Pulses: Normal pulses.      Heart sounds: Normal heart sounds.   Pulmonary:      Effort: Pulmonary effort is normal.      Breath sounds: Normal breath sounds. No wheezing.   Neurological:      Mental Status: She is oriented to person, place, and time.      Result Review  Data Reviewed:{ Labs  Result Review  Imaging  Med Tab  Media :23}                Vital Signs:   /70 (BP Location: Left arm, Patient Position: Sitting, Cuff Size: Adult)   Pulse 83   Ht 165.1 cm (65\")   Wt 88.7 kg (195 lb 8 oz)   SpO2 98%   BMI 32.53 kg/m²         Requested Prescriptions      No prescriptions requested or ordered in " this encounter       Routine medications provided by this office will also be refilled via pharmacy request.       Current Outpatient Medications:     clonazePAM (KlonoPIN) 1 MG tablet, TAKE 1 TABLET BY MOUTH THREE TIMES A DAY AS NEEDED FOR ANXIETY, Disp: 90 tablet, Rfl: 0    Cyanocobalamin (VITAMIN B-12 PO), Take 1 tablet by mouth Every Other Day., Disp: , Rfl:     dicyclomine (BENTYL) 20 MG tablet, Take 1 tablet by mouth Every 6 (Six) Hours As Needed (for irritable bowel symptoms). Indications: Irritable Bowel Syndrome, Disp: 360 tablet, Rfl: 2    diphenoxylate-atropine (Lomotil) 2.5-0.025 MG per tablet, Take 2 tablets by mouth 4 (Four) Times a Day., Disp: 240 tablet, Rfl: 11    Enoxaparin Sodium (LOVENOX) 100 MG/ML solution prefilled syringe syringe, INJECT 1 ML UNDER THE SKIN INTO THE APPROPRIATE AREA AS DIRECTED EVERY 12 (TWELVE) HOURS., Disp: 60 mL, Rfl: 2    escitalopram (LEXAPRO) 10 MG tablet, TAKE 1 TABLET BY MOUTH EVERY DAY, Disp: 90 tablet, Rfl: 1    famotidine (PEPCID) 20 MG tablet, TAKE 1 TABLET BY MOUTH TWICE A DAY, Disp: 180 tablet, Rfl: 1    Loperamide HCl (IMODIUM PO), Take  by mouth., Disp: , Rfl:     Loratadine 10 MG capsule, Take 1 capsule by mouth Every Evening., Disp: , Rfl:     metoprolol succinate XL (TOPROL-XL) 25 MG 24 hr tablet, TAKE 0.5 TABLETS BY MOUTH EVERY NIGHT, Disp: 45 tablet, Rfl: 3    ondansetron (ZOFRAN) 8 MG tablet, TAKE 1 TABLET BY MOUTH THREE TIMES A DAY AS NEEDED FOR NAUSEA AND VOMITING, Disp: 60 tablet, Rfl: 1    promethazine (PHENERGAN) 25 MG tablet, Take 1 tablet by mouth Every 6 (Six) Hours As Needed for Nausea or Vomiting., Disp: 60 tablet, Rfl: 2    vitamin D (ERGOCALCIFEROL) 1.25 MG (18672 UT) capsule capsule, Take 1 capsule by mouth 1 (One) Time Per Week., Disp: , Rfl:      Assessment and Plan:      Assessment and Plan {CC Problem List  Visit Diagnosis  ROS  Review (Popup)  Health Maintenance  Quality  BestPractice  Medications  SmartSets  SnapShot  Encounters  Media :23}     Problem List Items Addressed This Visit          Coag and Thromboembolic    Chronic anticoagulation (Chronic)    Heterozygous factor V Leiden mutation (Chronic)    Current Assessment & Plan     Continues [facility]             Mental Health    Monopolar depression - Primary (Chronic)    Overview     lexapro 20 mg: decreased libido   Prior treatment:  Wellbutrin (believes it had effect on libido)              Pulmonary and Pneumonias    Pulmonary nodules    Current Assessment & Plan     She will have repeat scans in July.   ? PET scan     States considering radiation however given pulmonary nodules have not had bx, not clear they would proceed.             Follow Up {Instructions Charge Capture  Follow-up Communications :23}     Return in about 3 months (around 9/12/2023) for Annual physical.      Patient was given instructions and counseling regarding her condition or for health maintenance advice. Please see specific information pulled into the AVS if appropriate.    Dragon disclaimer:   Much of this encounter note is an electronic transcription/translation of spoken language to printed text. The electronic translation of spoken language may permit erroneous, or at times, nonsensical words or phrases to be inadvertently transcribed; Although I have reviewed the note for such errors, some may still exist.     Additional Patient Counseling:       Patient Instructions     Summer is coming!   Health Information:     Stay hydrated when outside  If your urine starts to get darker, you are not drinking enough     Protect yourself from ticks and mosquitos  Here are the best ingredients to look for:  DEET (N,N-diethyl-m-toluamide or N,N-diethyl-3-methyl-benzamide)  Picaridin  Oil of lemon eucalyptus (p-menthane-3,8-diol or PMD)  Wear light-colored pants so you can spot ticks easier  If you use a permethrin product, ONLY apply to clothing    Practice Safe Sun!    Use sun screen SPF >50 daily,  reapply regularly per directions on package  See dermatologist for skin check regularly  Protect your eyes with sunglasses with UV protection    Poison Ivy  If you are going into areas that may have poison ivy, prepare with a product like IvyX or other ivy blocker.  Wash your clothes and pets after being in an area with ivy when returning home. If you come in contact with poison ivy, try a product like Technu     Other things you should incorporate all year...     Diet:    Eat vegetables, fruits, whole grain, low-fat dairy, poultry, fish, beans, nontropical vegetable oils, and nuts, but avoid red meat (i.e., Mediterranean-style diet, DASH [Dietary Approaches to Stop Hypertension] diet).  Limit sugary drinks and sweets.  Limit saturated and trans fat to 5% to 6% of calories.  Limit sodium intake to 2,400 mg daily (about one teaspoon table salt [kosher/sea salt have less sodium per teaspoon]).  https://www.eatright.org/    Weight loss / Calorie Counting Apps:    Lose It!   Wahanda Pal   Works great when you try it with a partner/ friend. It takes about 15 minutes a day but studies show that this simple method of monitoring your intake can help you achieve goals as it keeps you accountable.  I often will ask patients to try these apps just to get an idea of how much sodium and how many carbohydrates you are taking in.   Exercise:   Engage in moderate-to-vigorous aerobic activity for at least 40 minutes (on average) three to four times each week.  Wearables:   Activity tracker   Step tracker: getting 7,500 steps daily can cut your cardiac risks by 44%   Bone Health:   [email or web address]  Routine weight bearing exercise      Please Note: Summer 2023 our office will be moving to our NEW LOCATION:   [address] [place] [address] [place]: [address]  Please [facility]  (moved is expected end of July or August)

## 2023-07-03 ENCOUNTER — TELEPHONE (OUTPATIENT)
Dept: INTERNAL MEDICINE | Facility: CLINIC | Age: 43
End: 2023-07-03

## 2023-07-22 ENCOUNTER — APPOINTMENT (OUTPATIENT)
Dept: CT IMAGING | Facility: HOSPITAL | Age: 43
DRG: 389 | End: 2023-07-22
Payer: COMMERCIAL

## 2023-07-22 ENCOUNTER — HOSPITAL ENCOUNTER (INPATIENT)
Facility: HOSPITAL | Age: 43
LOS: 2 days | Discharge: HOME | DRG: 389 | End: 2023-07-24
Attending: EMERGENCY MEDICINE | Admitting: COLON & RECTAL SURGERY
Payer: COMMERCIAL

## 2023-07-22 DIAGNOSIS — K56.600 PARTIAL SMALL BOWEL OBSTRUCTION: Primary | ICD-10-CM

## 2023-07-22 LAB
ALBUMIN SERPL-MCNC: 4.3 G/DL (ref 3.5–5.2)
ALBUMIN/GLOB SERPL: 1.3 G/DL
ALP SERPL-CCNC: 108 U/L (ref 39–117)
ALT SERPL W P-5'-P-CCNC: 25 U/L (ref 1–33)
ANION GAP SERPL CALCULATED.3IONS-SCNC: 14 MMOL/L (ref 5–15)
AST SERPL-CCNC: 18 U/L (ref 1–32)
BASOPHILS # BLD AUTO: 0.01 10*3/MM3 (ref 0–0.2)
BASOPHILS NFR BLD AUTO: 0.1 % (ref 0–1.5)
BILIRUB SERPL-MCNC: 0.5 MG/DL (ref 0–1.2)
BUN SERPL-MCNC: 7 MG/DL (ref 6–20)
BUN/CREAT SERPL: 9.2 (ref 7–25)
CALCIUM SPEC-SCNC: 10.1 MG/DL (ref 8.6–10.5)
CHLORIDE SERPL-SCNC: 104 MMOL/L (ref 98–107)
CO2 SERPL-SCNC: 22 MMOL/L (ref 22–29)
CREAT SERPL-MCNC: 0.76 MG/DL (ref 0.57–1)
DEPRECATED RDW RBC AUTO: 44.5 FL (ref 37–54)
EGFRCR SERPLBLD CKD-EPI 2021: 99.9 ML/MIN/1.73
EOSINOPHIL # BLD AUTO: 0.02 10*3/MM3 (ref 0–0.4)
EOSINOPHIL NFR BLD AUTO: 0.3 % (ref 0.3–6.2)
ERYTHROCYTE [DISTWIDTH] IN BLOOD BY AUTOMATED COUNT: 13.3 % (ref 12.3–15.4)
GLOBULIN UR ELPH-MCNC: 3.4 GM/DL
GLUCOSE SERPL-MCNC: 128 MG/DL (ref 65–99)
HCT VFR BLD AUTO: 50.8 % (ref 34–46.6)
HGB BLD-MCNC: 17.4 G/DL (ref 12–15.9)
IMM GRANULOCYTES # BLD AUTO: 0.02 10*3/MM3 (ref 0–0.05)
IMM GRANULOCYTES NFR BLD AUTO: 0.3 % (ref 0–0.5)
LYMPHOCYTES # BLD AUTO: 0.88 10*3/MM3 (ref 0.7–3.1)
LYMPHOCYTES NFR BLD AUTO: 12.1 % (ref 19.6–45.3)
MCH RBC QN AUTO: 30.9 PG (ref 26.6–33)
MCHC RBC AUTO-ENTMCNC: 34.3 G/DL (ref 31.5–35.7)
MCV RBC AUTO: 90.2 FL (ref 79–97)
MONOCYTES # BLD AUTO: 0.24 10*3/MM3 (ref 0.1–0.9)
MONOCYTES NFR BLD AUTO: 3.3 % (ref 5–12)
NEUTROPHILS NFR BLD AUTO: 6.09 10*3/MM3 (ref 1.7–7)
NEUTROPHILS NFR BLD AUTO: 83.9 % (ref 42.7–76)
NRBC BLD AUTO-RTO: 0 /100 WBC (ref 0–0.2)
PLATELET # BLD AUTO: 261 10*3/MM3 (ref 140–450)
PMV BLD AUTO: 9.2 FL (ref 6–12)
POTASSIUM SERPL-SCNC: 4 MMOL/L (ref 3.5–5.2)
PROT SERPL-MCNC: 7.7 G/DL (ref 6–8.5)
RBC # BLD AUTO: 5.63 10*6/MM3 (ref 3.77–5.28)
SODIUM SERPL-SCNC: 140 MMOL/L (ref 136–145)
WBC NRBC COR # BLD: 7.26 10*3/MM3 (ref 3.4–10.8)

## 2023-07-22 PROCEDURE — 74177 CT ABD & PELVIS W/CONTRAST: CPT

## 2023-07-22 PROCEDURE — 25010000002 ENOXAPARIN PER 10 MG: Performed by: COLON & RECTAL SURGERY

## 2023-07-22 PROCEDURE — 25510000001 IOPAMIDOL 61 % SOLUTION: Performed by: EMERGENCY MEDICINE

## 2023-07-22 PROCEDURE — 99285 EMERGENCY DEPT VISIT HI MDM: CPT

## 2023-07-22 PROCEDURE — 85025 COMPLETE CBC W/AUTO DIFF WBC: CPT | Performed by: EMERGENCY MEDICINE

## 2023-07-22 PROCEDURE — 25010000002 ONDANSETRON PER 1 MG: Performed by: EMERGENCY MEDICINE

## 2023-07-22 PROCEDURE — 80053 COMPREHEN METABOLIC PANEL: CPT | Performed by: EMERGENCY MEDICINE

## 2023-07-22 PROCEDURE — 25010000002 DIAZEPAM PER 5 MG: Performed by: COLON & RECTAL SURGERY

## 2023-07-22 PROCEDURE — 25010000002 ONDANSETRON PER 1 MG: Performed by: COLON & RECTAL SURGERY

## 2023-07-22 PROCEDURE — 25010000002 HYDROMORPHONE PER 4 MG: Performed by: EMERGENCY MEDICINE

## 2023-07-22 PROCEDURE — 25010000002 DIAZEPAM PER 5 MG: Performed by: EMERGENCY MEDICINE

## 2023-07-22 PROCEDURE — 25010000002 HYDROMORPHONE PER 4 MG: Performed by: COLON & RECTAL SURGERY

## 2023-07-22 RX ORDER — NICOTINE 21 MG/24HR
1 PATCH, TRANSDERMAL 24 HOURS TRANSDERMAL EVERY 24 HOURS
Status: DISCONTINUED | OUTPATIENT
Start: 2023-07-22 | End: 2023-07-24 | Stop reason: HOSPADM

## 2023-07-22 RX ORDER — SODIUM CHLORIDE, SODIUM LACTATE, POTASSIUM CHLORIDE, CALCIUM CHLORIDE 600; 310; 30; 20 MG/100ML; MG/100ML; MG/100ML; MG/100ML
100 INJECTION, SOLUTION INTRAVENOUS CONTINUOUS
Status: DISCONTINUED | OUTPATIENT
Start: 2023-07-22 | End: 2023-07-24

## 2023-07-22 RX ORDER — DIPHENHYDRAMINE HCL 25 MG
25 CAPSULE ORAL NIGHTLY PRN
Status: DISCONTINUED | OUTPATIENT
Start: 2023-07-22 | End: 2023-07-24 | Stop reason: HOSPADM

## 2023-07-22 RX ORDER — ALPRAZOLAM 0.25 MG/1
0.5 TABLET ORAL EVERY 8 HOURS PRN
Status: DISCONTINUED | OUTPATIENT
Start: 2023-07-22 | End: 2023-07-23

## 2023-07-22 RX ORDER — FAMOTIDINE 10 MG/ML
20 INJECTION, SOLUTION INTRAVENOUS EVERY 12 HOURS SCHEDULED
Status: DISCONTINUED | OUTPATIENT
Start: 2023-07-22 | End: 2023-07-24 | Stop reason: HOSPADM

## 2023-07-22 RX ORDER — ONDANSETRON 2 MG/ML
4 INJECTION INTRAMUSCULAR; INTRAVENOUS ONCE
Status: COMPLETED | OUTPATIENT
Start: 2023-07-22 | End: 2023-07-22

## 2023-07-22 RX ORDER — ONDANSETRON 2 MG/ML
4 INJECTION INTRAMUSCULAR; INTRAVENOUS EVERY 6 HOURS PRN
Status: DISCONTINUED | OUTPATIENT
Start: 2023-07-22 | End: 2023-07-24 | Stop reason: HOSPADM

## 2023-07-22 RX ORDER — SODIUM CHLORIDE 9 MG/ML
40 INJECTION, SOLUTION INTRAVENOUS AS NEEDED
Status: DISCONTINUED | OUTPATIENT
Start: 2023-07-22 | End: 2023-07-24 | Stop reason: HOSPADM

## 2023-07-22 RX ORDER — NALOXONE HCL 0.4 MG/ML
0.4 VIAL (ML) INJECTION
Status: DISCONTINUED | OUTPATIENT
Start: 2023-07-22 | End: 2023-07-24 | Stop reason: HOSPADM

## 2023-07-22 RX ORDER — HYDROMORPHONE HYDROCHLORIDE 1 MG/ML
0.25 INJECTION, SOLUTION INTRAMUSCULAR; INTRAVENOUS; SUBCUTANEOUS
Status: DISCONTINUED | OUTPATIENT
Start: 2023-07-22 | End: 2023-07-24 | Stop reason: HOSPADM

## 2023-07-22 RX ORDER — SODIUM CHLORIDE 0.9 % (FLUSH) 0.9 %
10 SYRINGE (ML) INJECTION AS NEEDED
Status: DISCONTINUED | OUTPATIENT
Start: 2023-07-22 | End: 2023-07-24 | Stop reason: HOSPADM

## 2023-07-22 RX ORDER — ENOXAPARIN SODIUM 100 MG/ML
1 INJECTION SUBCUTANEOUS EVERY 12 HOURS SCHEDULED
Status: DISCONTINUED | OUTPATIENT
Start: 2023-07-22 | End: 2023-07-24 | Stop reason: HOSPADM

## 2023-07-22 RX ORDER — DIAZEPAM 5 MG/ML
2.5 INJECTION, SOLUTION INTRAMUSCULAR; INTRAVENOUS ONCE
Status: COMPLETED | OUTPATIENT
Start: 2023-07-22 | End: 2023-07-22

## 2023-07-22 RX ORDER — ACETAMINOPHEN 10 MG/ML
1000 INJECTION, SOLUTION INTRAVENOUS EVERY 6 HOURS PRN
Status: DISCONTINUED | OUTPATIENT
Start: 2023-07-22 | End: 2023-07-23

## 2023-07-22 RX ORDER — HYDROMORPHONE HYDROCHLORIDE 1 MG/ML
0.5 INJECTION, SOLUTION INTRAMUSCULAR; INTRAVENOUS; SUBCUTANEOUS ONCE
Status: COMPLETED | OUTPATIENT
Start: 2023-07-22 | End: 2023-07-22

## 2023-07-22 RX ORDER — MORPHINE SULFATE 2 MG/ML
1 INJECTION, SOLUTION INTRAMUSCULAR; INTRAVENOUS EVERY 4 HOURS PRN
Status: DISCONTINUED | OUTPATIENT
Start: 2023-07-22 | End: 2023-07-23

## 2023-07-22 RX ORDER — NITROGLYCERIN 0.4 MG/1
0.4 TABLET SUBLINGUAL
Status: DISCONTINUED | OUTPATIENT
Start: 2023-07-22 | End: 2023-07-24 | Stop reason: HOSPADM

## 2023-07-22 RX ORDER — ESCITALOPRAM OXALATE 10 MG/1
10 TABLET ORAL DAILY
Status: DISCONTINUED | OUTPATIENT
Start: 2023-07-22 | End: 2023-07-24 | Stop reason: HOSPADM

## 2023-07-22 RX ORDER — DIAZEPAM 5 MG/ML
5 INJECTION, SOLUTION INTRAMUSCULAR; INTRAVENOUS EVERY 6 HOURS PRN
Status: DISCONTINUED | OUTPATIENT
Start: 2023-07-22 | End: 2023-07-24 | Stop reason: HOSPADM

## 2023-07-22 RX ORDER — SODIUM CHLORIDE 0.9 % (FLUSH) 0.9 %
10 SYRINGE (ML) INJECTION EVERY 12 HOURS SCHEDULED
Status: DISCONTINUED | OUTPATIENT
Start: 2023-07-22 | End: 2023-07-24 | Stop reason: HOSPADM

## 2023-07-22 RX ADMIN — DIAZEPAM 2.5 MG: 5 INJECTION INTRAMUSCULAR; INTRAVENOUS at 18:22

## 2023-07-22 RX ADMIN — HYDROMORPHONE HYDROCHLORIDE 0.5 MG: 1 INJECTION, SOLUTION INTRAMUSCULAR; INTRAVENOUS; SUBCUTANEOUS at 16:50

## 2023-07-22 RX ADMIN — SODIUM CHLORIDE 1000 ML: 9 INJECTION, SOLUTION INTRAVENOUS at 14:59

## 2023-07-22 RX ADMIN — ENOXAPARIN SODIUM 100 MG: 100 INJECTION SUBCUTANEOUS at 23:19

## 2023-07-22 RX ADMIN — FAMOTIDINE 20 MG: 10 INJECTION INTRAVENOUS at 23:19

## 2023-07-22 RX ADMIN — ONDANSETRON 4 MG: 2 INJECTION INTRAMUSCULAR; INTRAVENOUS at 20:28

## 2023-07-22 RX ADMIN — IOPAMIDOL 85 ML: 612 INJECTION, SOLUTION INTRAVENOUS at 16:09

## 2023-07-22 RX ADMIN — Medication 10 ML: at 21:00

## 2023-07-22 RX ADMIN — ONDANSETRON 4 MG: 2 INJECTION INTRAMUSCULAR; INTRAVENOUS at 15:01

## 2023-07-22 RX ADMIN — DIAZEPAM 5 MG: 5 INJECTION INTRAMUSCULAR; INTRAVENOUS at 22:09

## 2023-07-22 RX ADMIN — HYDROMORPHONE HYDROCHLORIDE 0.25 MG: 1 INJECTION, SOLUTION INTRAMUSCULAR; INTRAVENOUS; SUBCUTANEOUS at 20:28

## 2023-07-22 RX ADMIN — SODIUM CHLORIDE, POTASSIUM CHLORIDE, SODIUM LACTATE AND CALCIUM CHLORIDE 100 ML/HR: 600; 310; 30; 20 INJECTION, SOLUTION INTRAVENOUS at 20:28

## 2023-07-22 RX ADMIN — HYDROMORPHONE HYDROCHLORIDE 0.5 MG: 1 INJECTION, SOLUTION INTRAMUSCULAR; INTRAVENOUS; SUBCUTANEOUS at 15:02

## 2023-07-22 NOTE — ED NOTES
Nursing report ED to floor  Carole Weaver  42 y.o.  female    HPI :   Chief Complaint   Patient presents with    Abdominal Pain    Constipation    Vomiting       Admitting doctor:   Padmaja Ye MD    Admitting diagnosis:   The encounter diagnosis was Partial small bowel obstruction.    Code status:   Current Code Status       Date Active Code Status Order ID Comments User Context       Prior            Allergies:   Patient has no known allergies.    Isolation:   No active isolations    Intake and Output    Intake/Output Summary (Last 24 hours) at 7/22/2023 1716  Last data filed at 7/22/2023 1459  Gross per 24 hour   Intake 1000 ml   Output --   Net 1000 ml       Weight:       07/22/23  1419   Weight: 87.1 kg (192 lb)       Most recent vitals:   Vitals:    07/22/23 1444 07/22/23 1536 07/22/23 1627 07/22/23 1652   BP: (!) 173/111      Pulse:  73 78 73   Resp:       Temp:       SpO2:  97% 97% 97%   Weight:       Height:           Active LDAs/IV Access:   Lines, Drains & Airways       Active LDAs       Name Placement date Placement time Site Days    Peripheral IV 07/22/23 1449 Posterior;Right Hand 07/22/23  1449  Hand  less than 1    Single Lumen Implantable Port 12/18/20 Right Chest 12/18/20  1200  Chest  946                    Labs (abnormal labs have a star):   Labs Reviewed   COMPREHENSIVE METABOLIC PANEL - Abnormal; Notable for the following components:       Result Value    Glucose 128 (*)     All other components within normal limits    Narrative:     GFR Normal >60  Chronic Kidney Disease <60  Kidney Failure <15     CBC WITH AUTO DIFFERENTIAL - Abnormal; Notable for the following components:    RBC 5.63 (*)     Hemoglobin 17.4 (*)     Hematocrit 50.8 (*)     Neutrophil % 83.9 (*)     Lymphocyte % 12.1 (*)     Monocyte % 3.3 (*)     All other components within normal limits   CBC AND DIFFERENTIAL    Narrative:     The following orders were created for panel order CBC & Differential.  Procedure                                Abnormality         Status                     ---------                               -----------         ------                     CBC Auto Differential[[id number]]        Abnormal            Final result                 Please view results for these tests on the individual orders.       EKG:   No orders to display       Meds given in ED:   Medications   sodium chloride 0.9 % flush 10 mL (has no administration in time range)   HYDROmorphone (DILAUDID) injection 0.5 mg (0.5 mg Intravenous Given 7/22/23 1502)   ondansetron (ZOFRAN) injection 4 mg (4 mg Intravenous Given 7/22/23 1501)   sodium chloride 0.9 % bolus 1,000 mL ( Intravenous Currently Infusing 7/22/23 1646)   iopamidol (ISOVUE-300) 61 % injection 100 mL (85 mL Intravenous Given by Other 7/22/23 1609)   HYDROmorphone (DILAUDID) injection 0.5 mg (0.5 mg Intravenous Given 7/22/23 1650)       Imaging results:  No radiology results for the last day    Ambulatory status:   - up with assist    Social issues:   Social History     Socioeconomic History    Marital status:      Spouse name: Justus    Number of children: 1    Years of education: College   Tobacco Use    Smoking status: Every Day     Packs/day: 1.00     Years: 25.00     Pack years: 25.00     Types: Electronic Cigarette, Cigarettes     Passive exposure: Current    Smokeless tobacco: Never    Tobacco comments:     1 PPD/caffeine use    Vaping Use    Vaping Use: Some days    Substances: Nicotine    Devices: Disposable    Passive vaping exposure: Yes   Substance and Sexual Activity    Alcohol use: Not Currently     Comment: RARELY    Drug use: Never    Sexual activity: Yes     Partners: Male     Comment: .       NIH Stroke Scale:       Violeta Byrd RN  07/22/23 17:16 EDT

## 2023-07-22 NOTE — ED PROVIDER NOTES
EMERGENCY DEPARTMENT ENCOUNTER    Room Number:  23/23  PCP: Deepika Vela III, NP-C  Patient Care Team:  Deepika Vela III, NP-C as PCP - General (Family Medicine)  Padmaja Ye MD as Referring Physician (Colon and Rectal Surgery)  Beatrice Lau MD as Consulting Physician (Radiation Oncology)  Dong Nguyen MD PhD as Consulting Physician (Hematology and Oncology)  Wendy Cottrell MD as Consulting Physician (Obstetrics and Gynecology)  Tasha Bustos MD as Consulting Physician (Cardiology)  Charissa Messina PA-C as Physician Assistant (Colon and Rectal Surgery)  Lurdes Tucker PA-C as Physician Assistant (Physician Assistant)   Independent Historians: Patient    HPI:  Chief Complaint: Acute abdominal pain    A complete HPI/ROS/PMH/PSH/SH/FH are unobtainable due to: Nothing    Chronic or social conditions impacting patient care (Social Determinants of Health): None  (Financial Resource Strain / Food Insecurity / Transportation Needs / Physical Activity / Stress / Social Connections / Intimate Partner Violence / Housing Stability)    Context: Carole Weaver is a 42 y.o. female who presents to the ED c/o acute abdominal pain.  The patient reports that 2 days ago she started having abdominal pain as well as nausea and vomiting.  She reports her last bowel movement was 4 days ago.  She states that is not typical for her.  She states that she had an ileostomy reversed in November.  She states she typically has frequent bowel movements and any sort of constipation is abnormal for her.  She reports that she has had vomiting of dark vomit today.  She states that it smells bad.  She states she is unsure if it is stool or if it is blood.  She reports diffuse severe pain.    Review of prior external notes (non-ED) -and- Review of prior external test results outside of this encounter: Oncology note dated 7/14/2023 notes that she is maintained on Lovenox every 12 hours.  She  has factor V Leiden.  She has metastatic rectal cancer.  They note that she was concerning for disease progression.  The guardant reveal for circulating tumor DNA study was recommended.  That study is still pending in the laboratory.    Prescription drug monitoring program review:         PAST MEDICAL HISTORY  Active Ambulatory Problems     Diagnosis Date Noted    Anxiety 08/09/2016    Irritable bowel syndrome 08/09/2016    Monopolar depression 05/09/2019    Adenocarcinoma of rectum 07/12/2019    Family history of factor V Leiden mutation 08/01/2019    Heterozygous factor V Leiden mutation 08/02/2019    Encounter for fitting and adjustment of vascular catheter 08/07/2019    Functional diarrhea 09/03/2019    Adverse effect of antineoplastic and immunosuppressive drugs, initial encounter 09/03/2019    Hypokalemia 09/03/2019    Hypomagnesemia 09/03/2019    Other pulmonary embolism without acute cor pulmonale 09/20/2019    Kidney stone on right side 10/07/2019    Hydronephrosis with ureteropelvic junction (UPJ) obstruction 10/15/2019    Partial intestinal obstruction, unspecified as to cause 07/08/2019    Irregular heart beat 12/10/2019    Hemorrhoids, external 12/10/2019    Gastrointestinal hemorrhage, unspecified 07/08/2019    Esophagitis 07/08/2019    Family history of colonic polyps 12/10/2019    Chronic diarrhea 12/10/2019    Calculus of gallbladder 12/10/2019    Benign neoplasm of transverse colon 07/08/2019    Benign neoplasm of rectum and anal canal 07/08/2019    Loss of weight 12/10/2019    Heart murmur 12/10/2019    Anemia 01/22/2020    Anticoagulation adequate 01/22/2020    Iron deficiency 02/05/2020    Adverse effect of iron 02/05/2020    Drug-induced peripheral neuropathy 04/15/2020    On antineoplastic chemotherapy 04/25/2020    Chemotherapy-induced thrombocytopenia 04/25/2020    HTN (hypertension) 04/25/2020    Ileostomy present 04/25/2020    Chronic anticoagulation 04/25/2020    Chemotherapy-induced  neutropenia 2020    Hand foot syndrome 2020    Pulmonary nodule, right 2020    Abdominopelvic abscess 2020    Perirectal abscess 2020    Pulmonary nodules 2020    Secondary cancer of lung 10/05/2020    Leukocytopenia 11/10/2020    Thrombosis of superior vena cava 2020    Tachycardia 2020    History of rectal cancer 2021    Macrocytosis without anemia 2021    Sciatica     Right ureteral stone 10/01/2019    Right shoulder pain 2020    Pulmonary embolism without acute cor pulmonale 2019    Palmar-plantar erythrodysesthesia 2021    On anticoagulant therapy     Macrocytosis 2021    Lump of right breast     Kidney stones 2007    Ileostomy in place     IBS (irritable bowel syndrome)     HPV in female     History of radiation therapy     History of gestational diabetes     History of chemotherapy     History of anemia     Hemorrhoids     GERD (gastroesophageal reflux disease)     Fistula of intestine     Colon polyps     Chemotherapy-induced nausea 2020    Chemotherapy induced diarrhea 2021    Cervical lymphadenitis 2012    Bilateral hand swelling 2020    Bilateral epiphora 2020    Abnormal Pap smear of cervix 1998    Family history of colon cancer 10/05/2022    Neck pain on left side 2023     Resolved Ambulatory Problems     Diagnosis Date Noted    Immunization due 2018    UTI (urinary tract infection) 2019    Rectal cancer 2019    Hematochezia 12/10/2019    Pain associated with surgical procedure 2020    Rectal cancer 2020    Rectal cancer 2020    Hyponatremia 2020    Malignant neoplasm of colon 01/15/2021    Dysuria 2021    Urinary urgency 2020    Sepsis due to cellulitis (HCC) 2002    SAB (spontaneous ) 1996    Rectal cancer 2019    Lung cancer (HCC) 2020    Infected insect bite of neck 2016    History of  TB skin testing 01/17/2009    History of pre-eclampsia     Factor V Leiden mutation (HCC)     Depression     Cystitis 04/24/2020    Cystitis 10/27/2020    Chemotherapy induced neutropenia (HCC) 04/2020    Anemia in pregnancy      Past Medical History:   Diagnosis Date    Abscess of abdominal wall 11/28/2012    Coronary artery calcification     COVID-19 06/09/2022    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 JAMEY TALAVERA AT [facility]    COLON RESECTION N/A 12/02/2019    Procedure: laparoscopic low anterior colon resection with mobilization of splenic flexure and diverting loop ileostomy: ERAS;  Surgeon: Padmaja Esparza MD;  Location: [facility];  Service: General    COLON RESECTION N/A 08/03/2020    Procedure: LOW ANTERIOR COLON RESECTION, COLOANAL ANASTOMOSIS, MOBILIZATION SPLENIC FLEXURE;  Surgeon: Padmaja Esparza MD;  Location: [facility];  Service: General;  Laterality: N/A;    COLONOSCOPY N/A 07/15/2020    PATENT ANASTAMOSIS IN MID RECTUM, RESCOPE IN 1 YR, DR. PADMAJA ESPARZA AT [facility]    COLONOSCOPY N/A 11/03/2022    DIFFUSE AREA OF MODERATELY FRIABLE MUCOSA IN ENTIRE COLON , DIVERSION COLITIS, PATENT AND HEALTHY ANASTAMOSIS, DR. PADMAJA ESPARZA AT [facility]    COLONOSCOPY W/ POLYPECTOMY N/A 07/08/2019    15 MM TUBULOVILLOUS ADENOMA POLYP IN HEPATIC FLEXURE, 20 MMTUBULOVILLOUS ADENOMA WITH HIGH GRADE DYSPLASIA IN RECTOSIGMOID, 4 CM MASS IN MID RECTUM, PATH: INVASIVE MODERATELY DIFFERENTIATED ADENOCARCINOMA, DR. JENNIFER LI AT [facility]    DILATATION AND EVACUATION N/A 2009    ENDOSCOPY N/A 07/08/2019    LA GRADE A ESOPHAGITIS, GASTRITIS, ALL BIOPSIES BENIGN, DR. JENNIFER LI AT [facility]    ILEOSTOMY CLOSURE N/A 11/14/2022     Procedure: ILEOSTOMY TAKEDOWN;  Surgeon: Padmaja Esparza MD;  Location: [facility] MAIN OR;  Service: General;  Laterality: N/A;    INCISION AND DRAINAGE PERIRECTAL ABSCESS N/A 08/14/2020    Procedure: INCISION AND DRAINAGE OF retrorectal dehiscence abcess with drain placement and irrigation;  Surgeon: Padmaja Esparza MD;  Location: [facility] MAIN OR;  Service: General;  Laterality: N/A;    PAP SMEAR N/A 01/24/2014    SIGMOIDOSCOPY N/A 07/24/2019    INFILTRATIVE PARTIALLY OBSTRUCTING LARGE RECTAL CANCER, AREA TATOOED, DR. PADMAJA ESPARZA AT [facility]    SIGMOIDOSCOPY N/A 11/23/2019    AN ULCERATED PARTIALLY OBSTRUCTING MASS IN MID RECTUM, AREA TATTOOED, DR. PADMAJA ESPARZA AT [facility]    SIGMOIDOSCOPY N/A 07/22/2021    PATENT ANASTAMOSIS IN DISTAL RECTUM, RESCOPE IN 1 YR, DR. PADMAJA ESPARZA AT [facility]    TRANSRECTAL ULTRASOUND N/A 07/24/2019    Procedure: ULTRASOUND TRANSRECTAL;  Surgeon: Padmaja Esparza MD;  Location: [facility] ENDOSCOPY;  Service: General    URETEROSCOPY LASER LITHOTRIPSY WITH STENT INSERTION Right 10/30/2019    DR. ESTUARDO BEASLEY AT [facility]    VAGINAL DELIVERY N/A 09/18/1998    VENOUS ACCESS DEVICE (PORT) INSERTION Left 08/09/2019    Procedure: INSERTION VENOUS ACCESS DEVICE;  Surgeon: Sj Joseph MD;  [place] [facility] [place] [facility]  Service: General    VENOUS ACCESS DEVICE (PORT) INSERTION N/A 12/18/2020    Procedure: INSERTION VENOUS ACCESS DEVICE right side, removal venous access device left side;  Surgeon: Sj Joseph MD;  [place] [facility] [place] [facility]  Service: General;  Laterality: N/A;    WISDOM TOOTH EXTRACTION Bilateral 1993         FAMILY HISTORY  Family History   Problem Relation Age of Onset    Hyperlipidemia Mother     Colon polyps Mother     Heart disease Mother     Hypertension Mother     Factor V Leiden deficiency Mother     Skin cancer Father         Squamous in 60s    Atrial fibrillation Father     Drug abuse Sister     Mental illness Sister         Addiction    No Known Problems Son     Colon cancer  Maternal Uncle     Cancer Paternal Uncle     Colon cancer Paternal Uncle     Diabetes Paternal Uncle     Heart disease Paternal Grandfather     Cancer Paternal Aunt         OVARIAN    Malig Hyperthermia Neg Hx          SOCIAL HISTORY  Social History     Socioeconomic History    Marital status:      Spouse name: Justus    Number of children: Norma    Years of education: College   Tobacco Use    Smoking status: Every Day     Packs/day: 1.00     Years: 25.00     Pack years: 25.00     Types: Electronic Cigarette, Cigarettes     Passive exposure: Current    Smokeless tobacco: Never    Tobacco comments:     1 PPD/caffeine use    Vaping Use    Vaping Use: Some days    Substances: Nicotine    Devices: Disposable    Passive vaping exposure: Yes   Substance and Sexual Activity    Alcohol use: Not Currently     Comment: RARELY    Drug use: Never    Sexual activity: Yes     Partners: Male     Comment: .         ALLERGIES  Patient has no known allergies.        REVIEW OF SYSTEMS  Review of Systems  Included in HPI  All systems reviewed and negative except for those discussed in HPI.      PHYSICAL EXAM    I have reviewed the triage vital signs and nursing notes.    ED Triage Vitals   Temp Heart Rate Resp BP SpO2   07/22/23 1419 07/22/23 1419 07/22/23 1419 07/22/23 1444 07/22/23 1419   97 °F (36.1 °C) (!) 122 18 (!) 173/111 97 %      Temp src Heart Rate Source Patient Position BP Location FiO2 (%)   -- -- -- -- --              Physical Exam  GENERAL: Awake, alert, appears uncomfortable, vomiting  SKIN: Warm, dry  HENT: Normocephalic, atraumatic  EYES: no scleral icterus  CV: regular rhythm, regular rate  RESPIRATORY: normal effort, lungs clear  ABDOMEN: soft, generalized tenderness, distended  MUSCULOSKELETAL: no deformity  NEURO: alert, moves all extremities, follows commands                                                               LAB RESULTS  Recent Results (from the past 24 hour(s))   Comprehensive Metabolic  Panel    Collection Time: 07/22/23  2:53 PM    Specimen: Blood   Result Value Ref Range    Glucose 128 (H) 65 - 99 mg/dL    BUN 7 6 - 20 mg/dL    Creatinine 0.76 0.57 - 1.00 mg/dL    Sodium 140 136 - 145 mmol/L    Potassium 4.0 3.5 - 5.2 mmol/L    Chloride 104 98 - 107 mmol/L    CO2 22.0 22.0 - 29.0 mmol/L    Calcium 10.1 8.6 - 10.5 mg/dL    Total Protein 7.7 6.0 - 8.5 g/dL    Albumin 4.3 3.5 - 5.2 g/dL    ALT (SGPT) 25 1 - 33 U/L    AST (SGOT) 18 1 - 32 U/L    Alkaline Phosphatase 108 39 - 117 U/L    Total Bilirubin 0.5 0.0 - 1.2 mg/dL    Globulin 3.4 gm/dL    A/G Ratio 1.3 g/dL    BUN/Creatinine Ratio 9.2 7.0 - 25.0    Anion Gap 14.0 5.0 - 15.0 mmol/L    eGFR 99.9 >60.0 mL/min/1.73   CBC Auto Differential    Collection Time: 07/22/23  2:53 PM    Specimen: Blood   Result Value Ref Range    WBC 7.26 3.40 - 10.80 10*3/mm3    RBC 5.63 (H) 3.77 - 5.28 10*6/mm3    Hemoglobin 17.4 (H) 12.0 - 15.9 g/dL    Hematocrit 50.8 (H) 34.0 - 46.6 %    MCV 90.2 79.0 - 97.0 fL    MCH 30.9 26.6 - 33.0 pg    MCHC 34.3 31.5 - 35.7 g/dL    RDW 13.3 12.3 - 15.4 %    RDW-SD 44.5 37.0 - 54.0 fl    MPV 9.2 6.0 - 12.0 fL    Platelets 261 140 - 450 10*3/mm3    Neutrophil % 83.9 (H) 42.7 - 76.0 %    Lymphocyte % 12.1 (L) 19.6 - 45.3 %    Monocyte % 3.3 (L) 5.0 - 12.0 %    Eosinophil % 0.3 0.3 - 6.2 %    Basophil % 0.1 0.0 - 1.5 %    Immature Grans % 0.3 0.0 - 0.5 %    Neutrophils, Absolute 6.09 1.70 - 7.00 10*3/mm3    Lymphocytes, Absolute 0.88 0.70 - 3.10 10*3/mm3    Monocytes, Absolute 0.24 0.10 - 0.90 10*3/mm3    Eosinophils, Absolute 0.02 0.00 - 0.40 10*3/mm3    Basophils, Absolute 0.01 0.00 - 0.20 10*3/mm3    Immature Grans, Absolute 0.02 0.00 - 0.05 10*3/mm3    nRBC 0.0 0.0 - 0.2 /100 WBC       Ordered the above labs and independently reviewed the results.        RADIOLOGY  CT Abdomen Pelvis With Contrast    Result Date: 7/22/2023  CT SCAN OF THE ABDOMEN AND PELVIS WITH INTRAVENOUS CONTRAST  HISTORY: Abdominal pain and vomiting. Stage IV  colon cancer.  FINDINGS: The CT scan was performed as an emergency procedure through the abdomen and pelvis with intravenous contrast. As compared to multiple previous CT scans of the abdomen and pelvis including the most recent dated 07/07/2023. The following findings are present: 1. There is mild-to-moderate dilatation of fluid-filled small bowel loops measuring up to 3.3 cm with decompressed small bowel distally that is highly suspicious for partial small bowel obstruction. This is new since 07/07/2023 and the obstruction appears to be located in the lower right abdomen. 2. The lung bases are clear. There is mild diffuse fatty infiltration of the liver and no focal liver lesions are seen. The spleen, pancreas, both adrenal glands, both kidneys are unremarkable. The gallbladder has been removed. 3. There is no aortic aneurysm or retroperitoneal lymphadenopathy. In the pelvis there is prominent soft tissue thickening in the presacral space that is unchanged from multiple previous studies dating back to at least 11/29/2021. 4. At bone windows, there are several sclerotic lesions in the left iliac bone extending near the acetabulum but these remain stable.      Radiation dose reduction techniques were utilized, including automated exposure control and exposure modulation based on body size.  This report was finalized on 7/22/2023 5:16 PM by Dr. Nikunj Umaña M.D.       I ordered the above noted radiological studies. Reviewed by me and discussed with radiologist.  See dictation for official radiology interpretation.      PROCEDURES    Procedures      MEDICATIONS GIVEN IN ER    Medications   sodium chloride 0.9 % flush 10 mL (has no administration in time range)   diazePAM (VALIUM) injection 2.5 mg (has no administration in time range)   HYDROmorphone (DILAUDID) injection 0.5 mg (0.5 mg Intravenous Given 7/22/23 1502)   ondansetron (ZOFRAN) injection 4 mg (4 mg Intravenous Given 7/22/23 1501)   sodium chloride 0.9 %  bolus 1,000 mL ( Intravenous Canceled Entry 7/22/23 1646)   iopamidol (ISOVUE-300) 61 % injection 100 mL (85 mL Intravenous Given by Other 7/22/23 1609)   HYDROmorphone (DILAUDID) injection 0.5 mg (0.5 mg Intravenous Given 7/22/23 1650)         ORDERS PLACED DURING THIS VISIT:  Orders Placed This Encounter   Procedures    CT Abdomen Pelvis With Contrast    Comprehensive Metabolic Panel    CBC Auto Differential    Monitor Blood Pressure    Pulse Oximetry, Continuous    Nasogastric tube insertion    Discontinue Cardiac Monitoring    NG Tube to Low Wall Suction    Code Status and Medical Interventions:    IP General Consult (Use specialty-specific consult if known)    Insert Peripheral IV    Inpatient Admission    CBC & Differential         PROGRESS, DATA ANALYSIS, CONSULTS, AND MEDICAL DECISION MAKING    All labs have been independently interpreted by me.  All radiology studies have been reviewed by me and discussed with radiologist dictating the report.   EKG's independently viewed and interpreted by me.  Discussion below represents my analysis of pertinent findings related to patient's condition, differential diagnosis, treatment plan and final disposition.    Differential diagnosis includes but is not limited to bowel obstruction, upper GI bleed, lower GI bleed.    ED Course as of 07/22/23 1818   Sat Jul 22, 2023   1647 CT Abdomen Pelvis With Contrast  My independent interpretation of the CT of the abdomen pelvis is bowel obstruction [TR]   1648 CT Abdomen Pelvis With Contrast  Discussing with the radiologist by phone.  CT of the abdomen pelvis shows partial small bowel obstruction. [TR]   1650 I reviewed the work-up and findings with the patient and family at the bedside.  Answered all questions.  Plan admission.  She is agreeable. [TR]   1709 Discussing with Dr Ye with colorectal surgery.  She agrees to admit.  She requests a 4 E. bed. [TR]   1818 The patient is reporting some anxiety related to the NG tube.   IV Valium ordered. [TR]      ED Course User Index  [TR] Black Kitchen MD             AS OF 18:18 EDT VITALS:    BP - (!) 173/111  HR - 74  TEMP - 97 °F (36.1 °C)  O2 SATS - 94%        DIAGNOSIS  Final diagnoses:   Partial small bowel obstruction         DISPOSITION  ED Disposition       ED Disposition   Decision to Admit    Condition   --    Comment   Level of Care: Med/Surg [1]   Diagnosis: Partial small bowel obstruction [485164]   Admitting Physician: GERONIMO EPSARZA [[id number]]   Attending Physician: GERONIMO ESPARZA [[id number]]   Bed Request Comments: 4 east if available   Certification: I Certify That Inpatient Hospital Services Are Medically Necessary For Greater Than 2 Midnights                    Note Disclaimer: At [facility], we believe that sharing information builds trust and better relationships. You are receiving this note because you recently visited [facility]. It is possible you will see health information before a provider has talked with you about it. This kind of information can be easy to misunderstand. To help you fully understand what it means for your health, we urge you to discuss this note with your provider.         Black Kitchen MD  07/22/23 1711       Black Kitchen MD  07/22/23 1813

## 2023-07-22 NOTE — ED NOTES
Patient from home via PV reporting lower abdominal pain x 2 days. States her last BM was 4 days ago, today she vomited dark brown emesis. Patient states she has stage 4 colon cancer, had an ileostomy reversed in 11/22. States she normally has frequent BM's.

## 2023-07-23 ENCOUNTER — APPOINTMENT (OUTPATIENT)
Dept: GENERAL RADIOLOGY | Facility: HOSPITAL | Age: 43
DRG: 389 | End: 2023-07-23
Payer: COMMERCIAL

## 2023-07-23 LAB
ANION GAP SERPL CALCULATED.3IONS-SCNC: 9.5 MMOL/L (ref 5–15)
BASOPHILS # BLD AUTO: 0.01 10*3/MM3 (ref 0–0.2)
BASOPHILS NFR BLD AUTO: 0.1 % (ref 0–1.5)
BUN SERPL-MCNC: 8 MG/DL (ref 6–20)
BUN/CREAT SERPL: 12.7 (ref 7–25)
CALCIUM SPEC-SCNC: 8.7 MG/DL (ref 8.6–10.5)
CHLORIDE SERPL-SCNC: 107 MMOL/L (ref 98–107)
CO2 SERPL-SCNC: 23.5 MMOL/L (ref 22–29)
CREAT SERPL-MCNC: 0.63 MG/DL (ref 0.57–1)
DEPRECATED RDW RBC AUTO: 43.2 FL (ref 37–54)
EGFRCR SERPLBLD CKD-EPI 2021: 113 ML/MIN/1.73
EOSINOPHIL # BLD AUTO: 0.02 10*3/MM3 (ref 0–0.4)
EOSINOPHIL NFR BLD AUTO: 0.3 % (ref 0.3–6.2)
ERYTHROCYTE [DISTWIDTH] IN BLOOD BY AUTOMATED COUNT: 13.1 % (ref 12.3–15.4)
GLUCOSE SERPL-MCNC: 101 MG/DL (ref 65–99)
HCT VFR BLD AUTO: 40.7 % (ref 34–46.6)
HGB BLD-MCNC: 14 G/DL (ref 12–15.9)
IMM GRANULOCYTES # BLD AUTO: 0.03 10*3/MM3 (ref 0–0.05)
IMM GRANULOCYTES NFR BLD AUTO: 0.4 % (ref 0–0.5)
LYMPHOCYTES # BLD AUTO: 1.4 10*3/MM3 (ref 0.7–3.1)
LYMPHOCYTES NFR BLD AUTO: 19.1 % (ref 19.6–45.3)
MCH RBC QN AUTO: 31 PG (ref 26.6–33)
MCHC RBC AUTO-ENTMCNC: 34.4 G/DL (ref 31.5–35.7)
MCV RBC AUTO: 90.2 FL (ref 79–97)
MONOCYTES # BLD AUTO: 0.56 10*3/MM3 (ref 0.1–0.9)
MONOCYTES NFR BLD AUTO: 7.6 % (ref 5–12)
NEUTROPHILS NFR BLD AUTO: 5.31 10*3/MM3 (ref 1.7–7)
NEUTROPHILS NFR BLD AUTO: 72.5 % (ref 42.7–76)
NRBC BLD AUTO-RTO: 0 /100 WBC (ref 0–0.2)
PLATELET # BLD AUTO: 218 10*3/MM3 (ref 140–450)
PMV BLD AUTO: 9.2 FL (ref 6–12)
POTASSIUM SERPL-SCNC: 3.9 MMOL/L (ref 3.5–5.2)
RBC # BLD AUTO: 4.51 10*6/MM3 (ref 3.77–5.28)
SODIUM SERPL-SCNC: 140 MMOL/L (ref 136–145)
WBC NRBC COR # BLD: 7.33 10*3/MM3 (ref 3.4–10.8)

## 2023-07-23 PROCEDURE — 80048 BASIC METABOLIC PNL TOTAL CA: CPT | Performed by: COLON & RECTAL SURGERY

## 2023-07-23 PROCEDURE — 74018 RADEX ABDOMEN 1 VIEW: CPT

## 2023-07-23 PROCEDURE — 25010000002 HYDROMORPHONE PER 4 MG: Performed by: COLON & RECTAL SURGERY

## 2023-07-23 PROCEDURE — 25010000002 ENOXAPARIN PER 10 MG: Performed by: COLON & RECTAL SURGERY

## 2023-07-23 PROCEDURE — 99222 1ST HOSP IP/OBS MODERATE 55: CPT | Performed by: COLON & RECTAL SURGERY

## 2023-07-23 PROCEDURE — 85025 COMPLETE CBC W/AUTO DIFF WBC: CPT | Performed by: COLON & RECTAL SURGERY

## 2023-07-23 PROCEDURE — 36415 COLL VENOUS BLD VENIPUNCTURE: CPT | Performed by: COLON & RECTAL SURGERY

## 2023-07-23 RX ORDER — ACETAMINOPHEN 325 MG/1
650 TABLET ORAL EVERY 6 HOURS PRN
Status: DISCONTINUED | OUTPATIENT
Start: 2023-07-23 | End: 2023-07-24 | Stop reason: HOSPADM

## 2023-07-23 RX ADMIN — ENOXAPARIN SODIUM 100 MG: 100 INJECTION SUBCUTANEOUS at 21:08

## 2023-07-23 RX ADMIN — Medication 10 ML: at 21:08

## 2023-07-23 RX ADMIN — METOPROLOL TARTRATE 2.5 MG: 1 INJECTION, SOLUTION INTRAVENOUS at 02:47

## 2023-07-23 RX ADMIN — FAMOTIDINE 20 MG: 10 INJECTION INTRAVENOUS at 21:08

## 2023-07-23 RX ADMIN — METOPROLOL TARTRATE 2.5 MG: 1 INJECTION, SOLUTION INTRAVENOUS at 21:08

## 2023-07-23 RX ADMIN — FAMOTIDINE 20 MG: 10 INJECTION INTRAVENOUS at 09:26

## 2023-07-23 RX ADMIN — SODIUM CHLORIDE, POTASSIUM CHLORIDE, SODIUM LACTATE AND CALCIUM CHLORIDE 100 ML/HR: 600; 310; 30; 20 INJECTION, SOLUTION INTRAVENOUS at 06:06

## 2023-07-23 RX ADMIN — HYDROMORPHONE HYDROCHLORIDE 0.25 MG: 1 INJECTION, SOLUTION INTRAMUSCULAR; INTRAVENOUS; SUBCUTANEOUS at 00:10

## 2023-07-23 RX ADMIN — Medication 10 ML: at 09:28

## 2023-07-23 RX ADMIN — ENOXAPARIN SODIUM 100 MG: 100 INJECTION SUBCUTANEOUS at 09:27

## 2023-07-23 RX ADMIN — SODIUM CHLORIDE, POTASSIUM CHLORIDE, SODIUM LACTATE AND CALCIUM CHLORIDE 100 ML/HR: 600; 310; 30; 20 INJECTION, SOLUTION INTRAVENOUS at 16:31

## 2023-07-23 RX ADMIN — METOPROLOL TARTRATE 2.5 MG: 1 INJECTION, SOLUTION INTRAVENOUS at 09:27

## 2023-07-23 RX ADMIN — ESCITALOPRAM OXALATE 10 MG: 10 TABLET, FILM COATED ORAL at 14:30

## 2023-07-23 RX ADMIN — METOPROLOL TARTRATE 2.5 MG: 1 INJECTION, SOLUTION INTRAVENOUS at 14:34

## 2023-07-23 NOTE — PLAN OF CARE
Goal Outcome Evaluation:  Plan of Care Reviewed With: patient        Progress: improving  Outcome Evaluation: VSS afebrile. Medicated x1 with valium and x2 with dilaudid and pain appears to be under control at this time. No NGT output since ER, pt has remained npo. Pt less anxious and states she is passing flatus. WCTM.

## 2023-07-23 NOTE — H&P
Carole Weaver is a 42 y.o. female who started having abdominal pain, nausea, vomiting over the last couple days.  It is intensified and she sought attention in the emergency room.. She denies any fever.  She has not had a bowel movement or pass gas for couple days.  She is receiving treatment for metastatic rectal cancer.  Earlier this morning she started having waves of pain and then started having bowel movements.  She has not had pain since then.  She has not needed any pain medication since the middle of the night.  She is not nauseated.  Has not put anything for several hours.    Past Medical History:   Diagnosis Date    Abdominopelvic abscess 08/12/2020    ADMITTED TO [facility]    Abnormal Pap smear of cervix 02/02/1998    JULIUS I    Abscess of abdominal wall 11/28/2012    SEEN AT [facility] ER    Anemia in pregnancy     Anxiety     Bilateral epiphora 04/2020    Bilateral hand swelling 05/02/2020    SEEN AT [facility] ER    Cervical lymphadenitis 06/06/2012    SEEN AT [facility] ER    Chemotherapy induced diarrhea 01/2021    Chemotherapy induced neutropenia 04/2020    Chemotherapy-induced nausea 02/2020    Chemotherapy-induced thrombocytopenia 05/2020    Chronic anticoagulation     Chronic diarrhea     Colon polyps     FOLLOWED BY DR. 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 ALEXANDRU HUNT    History of gestational diabetes     History of pre-eclampsia 1998    History  of pre-eclampsia     History of radiation therapy     FOLLOWED BY DR. JAVON LEWIS    History of TB skin testing 2009    TB Skin Test    History of TB skin testing 2009    TB Skin Test    History of transfusion 2019    HPV in female 1998    Hypokalemia 2019    Hypomagnesemia 2019    Hyponatremia 2021    IBS (irritable bowel syndrome)     Infected insect bite of neck 2016    SEEN AT [facility]    Kidney stones 2007    SEEN AT [facility] ER    Low anterior resection syndrome 2022    FOLLOWED BY DR. PADMAJA ESPARZA    Lump of right breast     SWOLLEN LYMPH NODE    Lung cancer 2020    METASTATIC LUNG CANCER    Macrocytosis 2021    Monopolar depression     On anticoagulant therapy     Pain associated with surgical procedure 2020    Palmar-plantar erythrodysesthesia 2021    Perirectal abscess 2020    Pulmonary embolism without acute cor pulmonale 09/20/2019    X 3; ADMITTED TO [facility]    Pulmonary nodule, right 2020    Rectal cancer 2019    STAGE IIA, INVASIVE MODERATELY DIFFERENTIATED ADENOCARCINOMA, CHEMO AND XRT FINISHED 2019    Right shoulder pain 2020    SEEN AT [facility] ER    Right ureteral stone 10/01/2019    SEEN AT [facility] ER    SAB (spontaneous ) 1996     A2-1 INDUCED    Sciatica     Sepsis due to cellulitis 2002    LEFT GREAT TOE, ADMITTED TO [facility]    Sepsis due to cellulitis 2002    LEFT GREAT TOE, ADMITTED TO [facility]    Tachycardia 2020    Thrombosis of superior vena cava 2020    AND BRACHIOCEPHALIC VEIN, ADMITTED TO [facility]    Urinary urgency 2020       Past Surgical History:   Procedure Laterality Date    ABDOMINAL SURGERY      CHOLECYSTECTOMY N/A 10/10/2003    LAPAROSCOPIC WITH CHOLANGIOGRAM, DR. JAMEY TALAVERA AT [facility]    COLON RESECTION N/A 2019    Procedure: laparoscopic low anterior colon resection with mobilization of splenic flexure and diverting loop ileostomy: ERAS;  Surgeon: Padmaja Esparza,  MD;  [facility]: [facility] OR;  Service: General    COLON RESECTION N/A 08/03/2020    Procedure: LOW ANTERIOR COLON RESECTION, COLOANAL ANASTOMOSIS, MOBILIZATION SPLENIC FLEXURE;  Surgeon: Padmaja Esparza MD;  Location: [facility] MAIN OR;  Service: General;  Laterality: N/A;    COLONOSCOPY N/A 07/15/2020    PATENT ANASTAMOSIS IN MID RECTUM, RESCOPE IN 1 YR, DR. PADMAJA ESPARZA AT [facility]    COLONOSCOPY N/A 11/03/2022    DIFFUSE AREA OF MODERATELY FRIABLE MUCOSA IN ENTIRE COLON , DIVERSION COLITIS, PATENT AND HEALTHY ANASTAMOSIS, DR. PADMAJA ESPARZA AT [facility]    COLONOSCOPY W/ POLYPECTOMY N/A 07/08/2019    15 MM TUBULOVILLOUS ADENOMA POLYP IN HEPATIC FLEXURE, 20 MMTUBULOVILLOUS ADENOMA WITH HIGH GRADE DYSPLASIA IN RECTOSIGMOID, 4 CM MASS IN MID RECTUM, PATH: INVASIVE MODERATELY DIFFERENTIATED ADENOCARCINOMA, DR. JENNIFER LI AT [facility]    DILATATION AND EVACUATION N/A 2009    ENDOSCOPY N/A 07/08/2019    LA GRADE A ESOPHAGITIS, GASTRITIS, ALL BIOPSIES BENIGN, DR. JENNIFER LI AT [facility]    ILEOSTOMY CLOSURE N/A 11/14/2022    Procedure: ILEOSTOMY TAKEDOWN;  Surgeon: Padmaja Esparza MD;  Location: [facility];  Service: General;  Laterality: N/A;    INCISION AND DRAINAGE PERIRECTAL ABSCESS N/A 08/14/2020    Procedure: INCISION AND DRAINAGE OF retrorectal dehiscence abcess with drain placement and irrigation;  Surgeon: Padmaja Esparza MD;  Location: [facility];  Service: General;  Laterality: N/A;    PAP SMEAR N/A 01/24/2014    SIGMOIDOSCOPY N/A 07/24/2019    INFILTRATIVE PARTIALLY OBSTRUCTING LARGE RECTAL CANCER, AREA TATOOED, DR. PADMAJA ESPARZA AT [facility]    SIGMOIDOSCOPY N/A 11/23/2019    AN ULCERATED PARTIALLY OBSTRUCTING MASS IN MID RECTUM, AREA TATTOOED, DR. PADMAJA ESPARZA AT [facility]    SIGMOIDOSCOPY N/A 07/22/2021    PATENT ANASTAMOSIS IN DISTAL RECTUM, RESCOPE IN 1 YR, DR. PADMAJA ESPARZA AT [facility]    TRANSRECTAL ULTRASOUND N/A 07/24/2019    Procedure: ULTRASOUND TRANSRECTAL;  Surgeon: Padmaja Esparza MD;   Location: [facility] ENDOSCOPY;  Service: General    URETEROSCOPY LASER LITHOTRIPSY WITH STENT INSERTION Right 10/30/2019    DR. ESTUARDO BEASLEY AT [place]    VAGINAL DELIVERY N/A 09/18/1998    VENOUS ACCESS DEVICE (PORT) INSERTION Left 08/09/2019    Procedure: INSERTION VENOUS ACCESS DEVICE;  Surgeon: Sj Joseph MD;  Location:  [facility];  Service: General    VENOUS ACCESS DEVICE (PORT) INSERTION N/A 12/18/2020    Procedure: INSERTION VENOUS ACCESS DEVICE right side, removal venous access device left side;  Surgeon: Sj Joseph MD;  Location: [facility] OR [facility];  Service: General;  Laterality: N/A;    WISDOM TOOTH EXTRACTION Bilateral 1993       Social History:   reports that she has been smoking electronic cigarette and cigarettes. She has a 25.00 pack-year smoking history. She has been exposed to tobacco smoke. She has never used smokeless tobacco. She reports that she does not currently use alcohol. She reports that she does not use drugs.      Marriage status:     Family History   Problem Relation Age of Onset    Hyperlipidemia Mother     Colon polyps Mother     Heart disease Mother     Hypertension Mother     Factor V Leiden deficiency Mother     Skin cancer Father         Squamous in 60s    Atrial fibrillation Father     Drug abuse Sister     Mental illness Sister         Addiction    No Known Problems Son     Colon cancer Maternal Uncle     Cancer Paternal Uncle     Colon cancer Paternal Uncle     Diabetes Paternal Uncle     Heart disease Paternal Grandfather     Cancer Paternal Aunt         OVARIAN    Malig Hyperthermia Neg Hx        No current facility-administered medications on file prior to encounter.     Current Outpatient Medications on File Prior to Encounter   Medication Sig Dispense Refill    clonazePAM (KlonoPIN) 1 MG tablet TAKE 1 TABLET BY MOUTH THREE TIMES A DAY AS NEEDED FOR ANXIETY 90 tablet 0    Cyanocobalamin (VITAMIN B-12 PO) Take 1 tablet by mouth Every Other Day.       dicyclomine (BENTYL) 20 MG tablet Take 1 tablet by mouth Every 6 (Six) Hours As Needed (for irritable bowel symptoms). Indications: Irritable Bowel Syndrome 360 tablet 2    diphenoxylate-atropine (Lomotil) 2.5-0.025 MG per tablet Take 2 tablets by mouth 4 (Four) Times a Day. 240 tablet 11    Enoxaparin Sodium (LOVENOX) 100 MG/ML solution prefilled syringe syringe INJECT 1 ML UNDER THE SKIN INTO THE APPROPRIATE AREA AS DIRECTED EVERY 12 (TWELVE) HOURS. 60 mL 2    escitalopram (LEXAPRO) 10 MG tablet TAKE 1 TABLET BY MOUTH EVERY DAY 90 tablet 1    famotidine (PEPCID) 20 MG tablet TAKE 1 TABLET BY MOUTH TWICE A  tablet 1    Loperamide HCl (IMODIUM PO) Take  by mouth.      Loratadine 10 MG capsule Take 1 capsule by mouth Every Evening.      metoprolol succinate XL (TOPROL-XL) 25 MG 24 hr tablet TAKE 0.5 TABLETS BY MOUTH EVERY NIGHT 45 tablet 3    ondansetron (ZOFRAN) 8 MG tablet TAKE 1 TABLET BY MOUTH THREE TIMES A DAY AS NEEDED FOR NAUSEA AND VOMITING 60 tablet 1    promethazine (PHENERGAN) 25 MG tablet Take 1 tablet by mouth Every 6 (Six) Hours As Needed for Nausea or Vomiting. 60 tablet 2    vitamin D (ERGOCALCIFEROL) 1.25 MG (91531 UT) capsule capsule Take 1 capsule by mouth 1 (One) Time Per Week.         Current Facility-Administered Medications:     acetaminophen (OFIRMEV) injection 1,000 mg, 1,000 mg, Intravenous, Q6H PRN, Padmaja Ye MD    Calcium Replacement - Follow Nurse / BPA Driven Protocol, , Does not apply, PRNEphraim Nechol L, MD    diazePAM (VALIUM) injection 5 mg, 5 mg, Intravenous, Q6H PRN, Padmaja Ye MD, 5 mg at 07/22/23 2209    diphenhydrAMINE (BENADRYL) capsule 25 mg, 25 mg, Oral, Nightly PRN, Padmaja Ye MD    Enoxaparin Sodium (LOVENOX) syringe 100 mg, 1 mg/kg, Subcutaneous, Q12H, Padmaja Ye MD, 100 mg at 07/22/23 9538    escitalopram (LEXAPRO) tablet 10 mg, 10 mg, Oral, Daily, Padmaja Ye MD    famotidine (PEPCID) injection 20 mg, 20 mg, Intravenous, Q12H,  Padmaja Ye MD, 20 mg at 07/22/23 2319    HYDROmorphone (DILAUDID) injection 0.25 mg, 0.25 mg, Intravenous, Q2H PRN, Padmaja Ye MD, 0.25 mg at 07/23/23 0010    lactated ringers infusion, 100 mL/hr, Intravenous, Continuous, Padmaja Ye MD, Last Rate: 100 mL/hr at 07/23/23 0606, 100 mL/hr at 07/23/23 0606    Magnesium Standard Dose Replacement - Follow Nurse / BPA Driven Protocol, , Does not apply, PRN, Padmaja Ye MD    metoprolol tartrate (LOPRESSOR) injection 2.5 mg, 2.5 mg, Intravenous, Q6H, Padmaja Ye MD, 2.5 mg at 07/23/23 0247    [DISCONTINUED] morphine injection 1 mg, 1 mg, Intravenous, Q4H PRN **AND** naloxone (NARCAN) injection 0.4 mg, 0.4 mg, Intravenous, Q5 Min PRN, Padmaja Ye MD    nicotine (NICODERM CQ) 21 MG/24HR patch 1 patch, 1 patch, Transdermal, Q24H, Padmaja Ye MD    nitroglycerin (NITROSTAT) SL tablet 0.4 mg, 0.4 mg, Sublingual, Q5 Min PRN, Padmaja Ye MD    ondansetron (ZOFRAN) injection 4 mg, 4 mg, Intravenous, Q6H PRN, Padmaja Ye MD, 4 mg at 07/22/23 2028    Phosphorus Replacement - Follow Nurse / BPA Driven Protocol, , Does not apply, Ephraim HAYWOOD Nechol L, MD    Potassium Replacement - Follow Nurse / BPA Driven Protocol, , Does not apply, Ephraim HAYWOOD Nechol L, MD    [COMPLETED] Insert Peripheral IV, , , Once **AND** sodium chloride 0.9 % flush 10 mL, 10 mL, Intravenous, PRN, Padmaja Ye MD    sodium chloride 0.9 % flush 10 mL, 10 mL, Intravenous, Q12H, Padmaja Ye MD, 10 mL at 07/22/23 2100    sodium chloride 0.9 % flush 10 mL, 10 mL, Intravenous, PRNEphraim Nechol L, MD    sodium chloride 0.9 % infusion 40 mL, 40 mL, Intravenous, PRN, Padmaja Ye MD    Allergy  Patient has no known allergies.    Review of Systems   Constitutional: Positive for decreased appetite. Negative for fever.   Gastrointestinal:  Negative for abdominal pain, nausea and vomiting.   All other systems reviewed and are negative.    Vitals:    07/23/23 0748    BP: 146/73   Pulse: 65   Resp:    Temp: 98.6 °F (37 °C)   SpO2: 96%     Body mass index is 31.3 kg/m².    Physical Exam  Constitutional:       General: She is not in acute distress.     Appearance: She is well-developed.   HENT:      Head: Normocephalic and atraumatic.   Abdominal:      General: There is no distension.      Palpations: Abdomen is soft.      Tenderness: There is no abdominal tenderness. There is no guarding.   Musculoskeletal:         General: Normal range of motion.   Neurological:      Mental Status: She is alert.   Psychiatric:         Thought Content: Thought content normal.            Review of Medical Record:  I reviewed labs which were within normal limits.  I reviewed CT scan which showed some mildly dilated small bowel loops with a transition in the right lower quadrant.  There is stool in the colon.    Assessment:  1. Partial small bowel obstruction        Plan: Admit.  Support with IV fluid.  NG tube was to low wall suction come out several centimeters based on x-ray.  Since she is having bowel movements have remove it.  Encourage ambulation.  Minimize narcotics.  Continue anticoagulation because of her factor V Leiden mutation and extensive clotting history.

## 2023-07-23 NOTE — CONSULTS
Nutrition Services    Patient Name:  Carole Weaver  YOB: 1980  MRN: [id number]  Admit Date:  7/22/2023    Assessment Date:  07/23/23    Comment: Nutrition consult due to MST score of 3 per nurse admission screen.  Admitted with abdominal pain, N/V x couple of days.  Partial SBO.  Receiving treatment for metastatic rectal cancer.  NPO currently.  NGT to LWS.  + flatus.    RD to follow as diet is advanced.    CLINICAL NUTRITION ASSESSMENT      Reason for Assessment MST score 2+, Nurse or Nurse Practitioner Consult     Diagnosis/Problem   Partial SBO   Medical/Surgical History Past Medical History:   Diagnosis Date    Abdominopelvic abscess 08/12/2020    ADMITTED TO [facility]    Abnormal Pap smear of cervix 02/02/1998    JULIUS I    Abscess of abdominal wall 11/28/2012    SEEN AT [facility] ER    Anemia in pregnancy     Anxiety     Bilateral epiphora 04/2020    Bilateral hand swelling 05/02/2020    SEEN AT [facility] ER    Cervical lymphadenitis 06/06/2012    SEEN AT [facility] ER    Chemotherapy induced diarrhea 01/2021    Chemotherapy induced neutropenia 04/2020    Chemotherapy-induced nausea 02/2020    Chemotherapy-induced thrombocytopenia 05/2020    Chronic anticoagulation     Chronic diarrhea     Colon polyps     FOLLOWED BY DR. 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 ALEXANDRU HUNT    History of gestational diabetes     History of pre-eclampsia 1998     History of pre-eclampsia     History of radiation therapy     FOLLOWED BY DR. JAVON LEWIS    History of TB skin testing 2009    TB Skin Test    History of TB skin testing 2009    TB Skin Test    History of transfusion 2019    HPV in female 1998    Hypokalemia 2019    Hypomagnesemia 2019    Hyponatremia 2021    IBS (irritable bowel syndrome)     Infected insect bite of neck 2016    SEEN AT [facility]    Kidney stones 2007    SEEN AT [facility] ER    Low anterior resection syndrome 2022    FOLLOWED BY DR. GERONIMO ESPARZA    Lump of right breast     SWOLLEN LYMPH NODE    Lung cancer 2020    METASTATIC LUNG CANCER    Macrocytosis 2021    Monopolar depression     On anticoagulant therapy     Pain associated with surgical procedure 2020    Palmar-plantar erythrodysesthesia 2021    Perirectal abscess 2020    Pulmonary embolism without acute cor pulmonale 09/20/2019    X 3; ADMITTED TO [facility]    Pulmonary nodule, right 2020    Rectal cancer 2019    STAGE IIA, INVASIVE MODERATELY DIFFERENTIATED ADENOCARCINOMA, CHEMO AND XRT FINISHED 2019    Right shoulder pain 2020    SEEN AT [facility] ER    Right ureteral stone 10/01/2019    SEEN AT [facility] ER    SAB (spontaneous ) 1996     A2-1 INDUCED    Sciatica     Sepsis due to cellulitis 2002    LEFT GREAT TOE, ADMITTED TO [facility]    Sepsis due to cellulitis 2002    LEFT GREAT TOE, ADMITTED TO [facility]    Tachycardia 2020    Thrombosis of superior vena cava 2020    AND BRACHIOCEPHALIC VEIN, ADMITTED TO [facility]    Urinary urgency 2020       Past Surgical History:   Procedure Laterality Date    ABDOMINAL SURGERY      CHOLECYSTECTOMY N/A 10/10/2003    LAPAROSCOPIC WITH CHOLANGIOGRAM, DR. JAMEY TALAVERA AT [facility]    COLON RESECTION N/A 2019    Procedure: laparoscopic low anterior colon resection with mobilization of splenic flexure and diverting loop ileostomy: ERAS;  Surgeon: Ephraim  Padmaja MASSEY MD;  Location: [facility] OR;  Service: General    COLON RESECTION N/A 08/03/2020    Procedure: LOW ANTERIOR COLON RESECTION, COLOANAL ANASTOMOSIS, MOBILIZATION SPLENIC FLEXURE;  Surgeon: Padmaja Esparza MD;  Location: [facility] MAIN OR;  Service: General;  Laterality: N/A;    COLONOSCOPY N/A 07/15/2020    PATENT ANASTAMOSIS IN MID RECTUM, RESCOPE IN 1 YR, DR. PADMAJA ESPARZA AT [facility]    COLONOSCOPY N/A 11/03/2022    DIFFUSE AREA OF MODERATELY FRIABLE MUCOSA IN ENTIRE COLON , DIVERSION COLITIS, PATENT AND HEALTHY ANASTAMOSIS, DR. PADMAJA ESPARZA AT [facility]    COLONOSCOPY W/ POLYPECTOMY N/A 07/08/2019    15 MM TUBULOVILLOUS ADENOMA POLYP IN HEPATIC FLEXURE, 20 MMTUBULOVILLOUS ADENOMA WITH HIGH GRADE DYSPLASIA IN RECTOSIGMOID, 4 CM MASS IN MID RECTUM, PATH: INVASIVE MODERATELY DIFFERENTIATED ADENOCARCINOMA, DR. JENNIFER LI AT [facility]    DILATATION AND EVACUATION N/A 2009    ENDOSCOPY N/A 07/08/2019    LA GRADE A ESOPHAGITIS, GASTRITIS, ALL BIOPSIES BENIGN, DR. JENNIFER LI AT [facility]    ILEOSTOMY CLOSURE N/A 11/14/2022    Procedure: ILEOSTOMY TAKEDOWN;  Surgeon: Padmaja Esparza MD;  Location: [facility];  Service: General;  Laterality: N/A;    INCISION AND DRAINAGE PERIRECTAL ABSCESS N/A 08/14/2020    Procedure: INCISION AND DRAINAGE OF retrorectal dehiscence abcess with drain placement and irrigation;  Surgeon: Padmaja Esparza MD;  Location: [facility];  Service: General;  Laterality: N/A;    PAP SMEAR N/A 01/24/2014    SIGMOIDOSCOPY N/A 07/24/2019    INFILTRATIVE PARTIALLY OBSTRUCTING LARGE RECTAL CANCER, AREA TATOOED, DR. PADMAJA ESPARZA AT [facility]    SIGMOIDOSCOPY N/A 11/23/2019    AN ULCERATED PARTIALLY OBSTRUCTING MASS IN MID RECTUM, AREA TATTOOED, DR. PADMAJA ESPARZA AT [facility]    SIGMOIDOSCOPY N/A 07/22/2021    PATENT ANASTAMOSIS IN DISTAL RECTUM, RESCOPE IN 1 YR, DR. PADMAJA ESPARZA AT [facility]    TRANSRECTAL ULTRASOUND N/A 07/24/2019    Procedure: ULTRASOUND TRANSRECTAL;  Surgeon: Padmaja Esparza  "MD SHILPA;  Location: [facility] ENDOSCOPY;  Service: General    URETEROSCOPY LASER LITHOTRIPSY WITH STENT INSERTION Right 10/30/2019    DR. ESTUARDO BEASLEY AT [place]    VAGINAL DELIVERY N/A 09/18/1998    VENOUS ACCESS DEVICE (PORT) INSERTION Left 08/09/2019    Procedure: INSERTION VENOUS ACCESS DEVICE;  Surgeon: Sj Joseph MD;  Location: [facility] OR [facility];  Service: General    VENOUS ACCESS DEVICE (PORT) INSERTION N/A 12/18/2020    Procedure: INSERTION VENOUS ACCESS DEVICE right side, removal venous access device left side;  Surgeon: Sj Joseph MD;  Location: [facility] OR [facility];  Service: General;  Laterality: N/A;    WISDOM TOOTH EXTRACTION Bilateral 1993        Encounter Information        Nutrition History:     Food Preferences:    Supplements:    Factors Affecting Intake: altered GI function     Anthropometrics        Current Height  Current Weight  BMI kg/m2 Height: 167.6 cm (66\")  Weight: 88 kg (193 lb 14.4 oz) (07/22/23 2050)  Body mass index is 31.3 kg/m².   Adjusted BMI (if applicable)    BMI Category Obese, Class I (30 - 34.9)       Admission Weight 193 lb (7/22)       Ideal Body Weight (IBW) 130 lb (59.3 kg)   Adjusted IBW (if applicable)        Usual Body Weight (UBW) 192-219 lb   Weight Change/Trend Loss       Weight History Wt Readings from Last 30 Encounters:   07/22/23 2050 88 kg (193 lb 14.4 oz)   07/22/23 1419 87.1 kg (192 lb)   07/14/23 1634 89 kg (196 lb 3.2 oz)   06/12/23 1146 88.7 kg (195 lb 8 oz)   05/22/23 1500 89.8 kg (198 lb)   05/10/23 0851 93 kg (205 lb)   04/20/23 0905 92.5 kg (204 lb)   04/19/23 1630 91.6 kg (202 lb)   03/22/23 0936 90 kg (198 lb 8 oz)   03/07/23 0841 89 kg (196 lb 1.6 oz)   02/28/23 1327 90.7 kg (200 lb)   02/22/23 0831 89.9 kg (198 lb 4.8 oz)   02/05/23 0708 91 kg (200 lb 9.6 oz)   02/05/23 0016 90.3 kg (199 lb)   01/23/23 0824 93.3 kg (205 lb 9.6 oz)   01/17/23 0952 91.8 kg (202 lb 4.8 oz)   01/17/23 0825 92.8 kg (204 lb 9.6 oz)   01/12/23 1417 90.3 kg (199 lb) "   01/06/23 0855 90.7 kg (199 lb 14.4 oz)   12/16/22 0851 93.2 kg (205 lb 8 oz)   12/02/22 1338 97.1 kg (214 lb)   11/08/22 0856 99.4 kg (219 lb 1.6 oz)   11/03/22 1249 99.8 kg (220 lb)   11/02/22 1522 99.8 kg (220 lb)   10/19/22 0855 98.4 kg (217 lb)   10/03/22 0854 96.9 kg (213 lb 9.6 oz)   09/20/22 0929 96.6 kg (212 lb 14.4 oz)   07/29/22 1447 99 kg (218 lb 3.2 oz)   06/23/22 0756 99.2 kg (218 lb 12.8 oz)   06/07/22 0859 98.1 kg (216 lb 3.2 oz)   05/24/22 0849 99.7 kg (219 lb 11.2 oz)   05/11/22 0848 97.5 kg (215 lb)   05/11/22 0937 98 kg (216 lb)           --  Tests/Procedures        Tests/Procedures CT scan, X-Ray     Labs       Pertinent Labs    Results from last 7 days   Lab Units 07/23/23  0442 07/22/23  1453   SODIUM mmol/L 140 140   POTASSIUM mmol/L 3.9 4.0   CHLORIDE mmol/L 107 104   CO2 mmol/L 23.5 22.0   BUN mg/dL 8 7   CREATININE mg/dL 0.63 0.76   CALCIUM mg/dL 8.7 10.1   BILIRUBIN mg/dL  --  0.5   ALK PHOS U/L  --  108   ALT (SGPT) U/L  --  25   AST (SGOT) U/L  --  18   GLUCOSE mg/dL 101* 128*     Results from last 7 days   Lab Units 07/23/23  0442 07/22/23  1453   HEMOGLOBIN g/dL 14.0 17.4*   HEMATOCRIT % 40.7 50.8*   WBC 10*3/mm3 7.33 7.26   ALBUMIN g/dL  --  4.3     Results from last 7 days   Lab Units 07/23/23  0442 07/22/23  1453   PLATELETS 10*3/mm3 218 261     COVID19   Date Value Ref Range Status   12/22/2020 Not Detected Not Detected - Ref. Range Final     No results found for: HGBA1C       Medications           Scheduled Medications Enoxaparin Sodium, 1 mg/kg, Subcutaneous, Q12H  escitalopram, 10 mg, Oral, Daily  famotidine, 20 mg, Intravenous, Q12H  metoprolol tartrate, 2.5 mg, Intravenous, Q6H  nicotine, 1 patch, Transdermal, Q24H  sodium chloride, 10 mL, Intravenous, Q12H       Infusions lactated ringers, 100 mL/hr, Last Rate: 100 mL/hr (07/23/23 0606)       PRN Medications   acetaminophen    Calcium Replacement - Follow Nurse / BPA Driven Protocol    diazePAM    diphenhydrAMINE     HYDROmorphone    Magnesium Standard Dose Replacement - Follow Nurse / BPA Driven Protocol    [DISCONTINUED] Morphine **AND** naloxone    nitroglycerin    ondansetron    Phosphorus Replacement - Follow Nurse / BPA Driven Protocol    Potassium Replacement - Follow Nurse / BPA Driven Protocol    [COMPLETED] Insert Peripheral IV **AND** sodium chloride    sodium chloride    sodium chloride     Physical Findings          Physical Appearance alert, obese, oriented, room air   Oral/Mouth Cavity WNL   Edema  no edema   Gastrointestinal hypoactive bowel sounds, non-distended , passing flatus, last bowel movement: 7/23   Skin  skin intact   Tubes/Drains/Lines NG tube to LWS   NFPE Not indicated at this time   --  Current Nutrition Orders & Evaluation of Intake       Oral Nutrition     Food Allergies NKFA   Current PO Diet NPO Diet NPO Type: Sips with Meds, Ice Chips   Supplement n/a   PO Evaluation     % PO Intake N/a    # of Days Evaluated    --  PES STATEMENT / NUTRITION DIAGNOSIS      Nutrition Dx Problem  Problem: Altered GI Function  Etiology: Medical Diagnosis partial SBO  Signs/Symptoms: NPO and Report/Observation    Comment:    --  NUTRITION INTERVENTION / PLAN OF CARE      Intervention Goal(s) Maintain nutrition status, Reduce/improve symptoms, Disease management/therapy, Initiate feeding/diet, and No significant weight loss         RD Intervention/Action Await begin PO diet, Follow Tx Progress, and Care plan reviewed         Prescription/Orders:       PO Diet       Supplements       Snacks       Enteral Nutrition       Parenteral Nutrition    New Prescription Ordered? No changes at this time   --      Monitor/Evaluation Per protocol, I&O, Pertinent labs, Weight, GI status, Symptoms   Discharge Plan/Needs Pending clinical course   Education Will instruct as appropriate   --    RD to follow per protocol.      Electronically signed by:  Cielo Bah RD  07/23/23 10:18 EDT

## 2023-07-23 NOTE — PLAN OF CARE
Problem: Adult Inpatient Plan of Care  Goal: Plan of Care Review  Outcome: Ongoing, Progressing  Flowsheets (Taken 7/23/2023 4355)  Progress: improving  Plan of Care Reviewed With: patient  Outcome Evaluation: Pt vitals stable. Ng removed. Pt tolerating ice chips. Positive ambulating. No c/o nausea. Pt safety maintainted

## 2023-07-24 ENCOUNTER — READMISSION MANAGEMENT (OUTPATIENT)
Dept: CALL CENTER | Facility: HOSPITAL | Age: 43
End: 2023-07-24
Payer: COMMERCIAL

## 2023-07-24 ENCOUNTER — TELEPHONE (OUTPATIENT)
Dept: SURGERY | Facility: CLINIC | Age: 43
End: 2023-07-24
Payer: COMMERCIAL

## 2023-07-24 VITALS
BODY MASS INDEX: 31.16 KG/M2 | DIASTOLIC BLOOD PRESSURE: 74 MMHG | OXYGEN SATURATION: 96 % | WEIGHT: 193.9 LBS | HEIGHT: 66 IN | SYSTOLIC BLOOD PRESSURE: 108 MMHG | HEART RATE: 70 BPM | RESPIRATION RATE: 18 BRPM | TEMPERATURE: 98.6 F

## 2023-07-24 LAB
ALBUMIN SERPL-MCNC: 3.1 G/DL (ref 3.5–5.2)
ANION GAP SERPL CALCULATED.3IONS-SCNC: 8.1 MMOL/L (ref 5–15)
BUN SERPL-MCNC: 9 MG/DL (ref 6–20)
BUN/CREAT SERPL: 15 (ref 7–25)
CALCIUM SPEC-SCNC: 8.5 MG/DL (ref 8.6–10.5)
CHLORIDE SERPL-SCNC: 107 MMOL/L (ref 98–107)
CO2 SERPL-SCNC: 23.9 MMOL/L (ref 22–29)
CREAT SERPL-MCNC: 0.6 MG/DL (ref 0.57–1)
EGFRCR SERPLBLD CKD-EPI 2021: 114.4 ML/MIN/1.73
GLUCOSE SERPL-MCNC: 79 MG/DL (ref 65–99)
MAGNESIUM SERPL-MCNC: 2.1 MG/DL (ref 1.6–2.6)
PHOSPHATE SERPL-MCNC: 2 MG/DL (ref 2.5–4.5)
PHOSPHATE SERPL-MCNC: 2.4 MG/DL (ref 2.5–4.5)
POTASSIUM SERPL-SCNC: 3.4 MMOL/L (ref 3.5–5.2)
POTASSIUM SERPL-SCNC: 3.6 MMOL/L (ref 3.5–5.2)
SODIUM SERPL-SCNC: 139 MMOL/L (ref 136–145)

## 2023-07-24 PROCEDURE — 84132 ASSAY OF SERUM POTASSIUM: CPT | Performed by: COLON & RECTAL SURGERY

## 2023-07-24 PROCEDURE — 83735 ASSAY OF MAGNESIUM: CPT | Performed by: COLON & RECTAL SURGERY

## 2023-07-24 PROCEDURE — 84100 ASSAY OF PHOSPHORUS: CPT | Performed by: COLON & RECTAL SURGERY

## 2023-07-24 PROCEDURE — 80069 RENAL FUNCTION PANEL: CPT | Performed by: COLON & RECTAL SURGERY

## 2023-07-24 PROCEDURE — 99238 HOSP IP/OBS DSCHRG MGMT 30/<: CPT | Performed by: PHYSICIAN ASSISTANT

## 2023-07-24 PROCEDURE — 25010000002 ENOXAPARIN PER 10 MG: Performed by: COLON & RECTAL SURGERY

## 2023-07-24 RX ORDER — LOPERAMIDE HYDROCHLORIDE 2 MG/1
4 CAPSULE ORAL ONCE
Status: COMPLETED | OUTPATIENT
Start: 2023-07-24 | End: 2023-07-24

## 2023-07-24 RX ORDER — ACETAMINOPHEN 325 MG/1
650 TABLET ORAL EVERY 6 HOURS PRN
Start: 2023-07-24

## 2023-07-24 RX ORDER — POTASSIUM CHLORIDE 750 MG/1
40 TABLET, FILM COATED, EXTENDED RELEASE ORAL EVERY 4 HOURS
Status: ACTIVE | OUTPATIENT
Start: 2023-07-24 | End: 2023-07-24

## 2023-07-24 RX ADMIN — ESCITALOPRAM OXALATE 10 MG: 10 TABLET, FILM COATED ORAL at 09:18

## 2023-07-24 RX ADMIN — Medication 2 PACKET: at 09:17

## 2023-07-24 RX ADMIN — Medication 10 ML: at 09:18

## 2023-07-24 RX ADMIN — FAMOTIDINE 20 MG: 10 INJECTION INTRAVENOUS at 09:16

## 2023-07-24 RX ADMIN — SODIUM CHLORIDE, POTASSIUM CHLORIDE, SODIUM LACTATE AND CALCIUM CHLORIDE 100 ML/HR: 600; 310; 30; 20 INJECTION, SOLUTION INTRAVENOUS at 02:54

## 2023-07-24 RX ADMIN — METOPROLOL TARTRATE 12.5 MG: 25 TABLET, FILM COATED ORAL at 13:23

## 2023-07-24 RX ADMIN — ENOXAPARIN SODIUM 100 MG: 100 INJECTION SUBCUTANEOUS at 09:17

## 2023-07-24 RX ADMIN — METOPROLOL TARTRATE 2.5 MG: 1 INJECTION, SOLUTION INTRAVENOUS at 09:17

## 2023-07-24 RX ADMIN — LOPERAMIDE HYDROCHLORIDE 4 MG: 2 CAPSULE ORAL at 13:23

## 2023-07-24 NOTE — PLAN OF CARE
Goal Outcome Evaluation:Patient ambulatory and independent. Dose of metoprolol titrate hold the 02:00 am dose with the HR of 55. No other complaints made.

## 2023-07-24 NOTE — PAYOR COMM NOTE
"Owen Carole PINEDA (42 y.o. Female)          DC SUMMARY FOR [id number]    CONTACT ELLA STEWARD  P# [phone]  F# [phone]             Date of Birth   1980    Social Security Number       Address   [address] [place] [address]    Home Phone       MRN   [id number]       Church   [id number]    Marital Status                               Admission Date   7/22/23    Admission Type   Emergency    Admitting Provider   Padmaja Ye MD    Attending Provider       Department, Room/Bed   [address], [address]/1       Discharge Date   7/24/2023    Discharge Disposition   Home or Self Care    Discharge Destination                                 Attending Provider: (none)   Allergies: No Known Allergies    Isolation: None   Infection: None   Code Status: CPR    Ht: 167.6 cm (66\")   Wt: 88 kg (193 lb 14.4 oz)    Admission Cmt: None   Principal Problem: Partial small bowel obstruction [K56.600]                   Active Insurance as of 7/22/2023       Primary Coverage       Payor Plan Insurance Group Employer/Plan Group    [employer] PPO [id number]       Payor Plan Address Payor Plan Phone Number Payor Plan Fax Number Effective Dates    [address] [phone]  1/1/2018 - None Entered    [place] [address]         Subscriber Name Subscriber Birth Date Member ID       JUSTUS WEAVER 1980 [id number]                     Emergency Contacts        (Rel.) Home Phone Work Phone Mobile Phone    Justus Weaver (Spouse) [phone] -- [phone]    ROSEANNEDENIS BARBOSA (Mother) [phone] -- [phone]                 Discharge Summary        Charissa Messina PA-C at 07/24/23 1333          Date of Admission: 7/22/2023  Date of Discharge:  07/24/23     Presenting Problem/History of Present Illness  Partial small bowel obstruction [K56.600]     Discharge Diagnosis:  Active Hospital Problems    Diagnosis  POA    **Partial " small bowel obstruction [K56.600]  Yes      Resolved Hospital Problems   No resolved problems to display.       Hospital Course  Patient is a 42 y.o. female with metastatic rectal cancer and Factor V Leiden (receiving Lovenox BID) presented to the [facility] ED on 07/22/2023 for increased abdominal pain, N/V, and constipation. CT showed mild-to-moderate dilation of the small bowel with decompression distally, concerning for partial SBO. NGT was placed and Pt admitted and started on IVF. The following day, she started having bowel function and NGT was removed. She was started on a clear liquid diet and slowly advanced. She is tolerating a regular diet without N/V. Having frequent BMs. Pt comfortable being D/C home today.     Consults:   Consults       No orders found from 6/23/2023 to 7/23/2023.            Discharge Disposition  Home or Self Care    Discharge Medications     Discharge Medications        New Medications        Instructions Start Date   acetaminophen 325 MG tablet  Commonly known as: TYLENOL   650 mg, Oral, Every 6 Hours PRN             Continue These Medications        Instructions Start Date   clonazePAM 1 MG tablet  Commonly known as: KlonoPIN   TAKE 1 TABLET BY MOUTH THREE TIMES A DAY AS NEEDED FOR ANXIETY      dicyclomine 20 MG tablet  Commonly known as: BENTYL   20 mg, Oral, Every 6 Hours PRN      diphenoxylate-atropine 2.5-0.025 MG per tablet  Commonly known as: Lomotil   2 tablets, Oral, 4 Times Daily      Enoxaparin Sodium 100 MG/ML solution prefilled syringe syringe  Commonly known as: LOVENOX   1 mg/kg (100 mg), Subcutaneous, Every 12 Hours Scheduled      escitalopram 10 MG tablet  Commonly known as: LEXAPRO   TAKE 1 TABLET BY MOUTH EVERY DAY      famotidine 20 MG tablet  Commonly known as: PEPCID   TAKE 1 TABLET BY MOUTH TWICE A DAY      IMODIUM PO   Oral      Loratadine 10 MG capsule   10 mg, Oral, Every Evening      metoprolol succinate XL 25 MG 24 hr tablet  Commonly known as: TOPROL-XL   12.5  mg, Oral, Nightly      ondansetron 8 MG tablet  Commonly known as: ZOFRAN   TAKE 1 TABLET BY MOUTH THREE TIMES A DAY AS NEEDED FOR NAUSEA AND VOMITING      promethazine 25 MG tablet  Commonly known as: PHENERGAN   25 mg, Oral, Every 6 Hours PRN      VITAMIN B-12 PO   1 tablet, Oral, Every 48 Hours      vitamin D 1.25 MG (73991 UT) capsule capsule  Commonly known as: ERGOCALCIFEROL   50,000 Units, Oral, Weekly               Discharge Diet:     Activity at Discharge:   Activity Instructions       Driving Restrictions      Type of Restriction: Driving    Driving Restrictions: No Driving Until Next Appointment    Gradually Increase Activity Until at Pre-Hospitalization Level      Lifting Restrictions      Type of Restriction: Lifting    Lifting Restrictions: Lifting Restriction (Indicate Limit)    Weight Limit (Pounds): 15    Length of Lifting Restriction: until office appt            Follow-up Appointments  Future Appointments   Date Time Provider Department Center   9/13/2023  9:30 AM Deepika Vela III NP-C [facility]   11/15/2023  8:40 AM Tasha Bustos MD [facility]     Additional Instructions for the Follow-ups that You Need to Schedule       Call MD With Problems / Concerns   As directed      Instructions: temp >101. Drainage from wound. vomitting.    Order Comments: Instructions: temp >101. Drainage from wound. vomitting.          Discharge Follow-up with Specified Provider: Ephraim; 2 Weeks   As directed      To: Ephraim    Follow Up: 2 Weeks                 Discharge Diet:   As tolerated  Activity at Discharge:   As tolerated  Follow-up Appointments  2 wks      Electronically signed by Charissa Messina PA-C at 07/24/23 9501

## 2023-07-24 NOTE — NURSING NOTE
Informed her that her K and Phos were low and need to be replace, she said she wants to wait for Dr. Ye to come and talk about her condition before taking it.

## 2023-07-24 NOTE — PLAN OF CARE
Goal Outcome Evaluation:              Outcome Evaluation: Pt tolerating regular diet. D/C home. Education completed

## 2023-07-24 NOTE — PAYOR COMM NOTE
"Carole Weaver (42 y.o. Female)          INPATIENT REQUEST FOR MEMBER [id number]     CONTACT ELLA STEWARD  P# [phone]  F# [phone]         Date of Birth   1980    Social Security Number       Address   [address] [id number]    Home Phone       MRN   [id number]       Muslim   Taoist    Marital Status                               Admission Date   7/22/23    Admission Type   Emergency    Admitting Provider   Padmaja Ye MD    Attending Provider   Padmaja Ye MD    Department, Room/Bed   [address], E465/1       Discharge Date       Discharge Disposition       Discharge Destination                                 Attending Provider: Padmaja Ye MD    Allergies: No Known Allergies    Isolation: None   Infection: None   Code Status: CPR    Ht: 167.6 cm (66\")   Wt: 88 kg (193 lb 14.4 oz)    Admission Cmt: None   Principal Problem: Partial small bowel obstruction [K56.600]                   Active Insurance as of 7/22/2023       Primary Coverage       Payor Plan Insurance Group Employer/Plan Group    ANTHEM BLUE CROSS ANTH [employer] BLUE [facility] PPO [id number]       Payor Plan Address Payor Plan Phone Number Payor Plan Fax Number Effective Dates    [address] [phone]  1/1/2018 - None Entered    [place] [address]         Subscriber Name Subscriber Birth Date Member ID       JUSTUS WEAVER 1980 [id number]                     Emergency Contacts        (Rel.) Home Phone Work Phone Mobile Phone    Justus Weaver (Spouse) [phone] -- [phone]    DENIS CRONIN (Mother) [phone] -- [phone]                 History & Physical        Padmaja Ye MD at 07/23/23 0910          Carole Weaver is a 42 y.o. female who started having abdominal pain, nausea, vomiting over the last couple days.  It is intensified and she sought attention in the emergency room.. She denies any fever.  She has not " had a bowel movement or pass gas for couple days.  She is receiving treatment for metastatic rectal cancer.  Earlier this morning she started having waves of pain and then started having bowel movements.  She has not had pain since then.  She has not needed any pain medication since the middle of the night.  She is not nauseated.  Has not put anything for several hours.    Past Medical History:   Diagnosis Date    Abdominopelvic abscess 08/12/2020    ADMITTED TO [facility]    Abnormal Pap smear of cervix 02/02/1998    JULIUS I    Abscess of abdominal wall 11/28/2012    SEEN AT [facility] ER    Anemia in pregnancy     Anxiety     Bilateral epiphora 04/2020    Bilateral hand swelling 05/02/2020    SEEN AT [facility] ER    Cervical lymphadenitis 06/06/2012    SEEN AT [facility] ER    Chemotherapy induced diarrhea 01/2021    Chemotherapy induced neutropenia 04/2020    Chemotherapy-induced nausea 02/2020    Chemotherapy-induced thrombocytopenia 05/2020    Chronic anticoagulation     Chronic diarrhea     Colon polyps     FOLLOWED BY DR. 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 ALEXANDRU HUNT    History of gestational diabetes     History of pre-eclampsia 1998    History of pre-eclampsia     History of radiation therapy 2019    FOLLOWED BY DR. JAVON LEWIS    History of TB skin testing 01/17/2009    TB Skin Test    History of TB skin testing 1/17/2009    TB Skin Test    History of  transfusion 2019    HPV in female 1998    Hypokalemia 2019    Hypomagnesemia 2019    Hyponatremia 2021    IBS (irritable bowel syndrome)     Infected insect bite of neck 2016    SEEN AT [facility]    Kidney stones 2007    SEEN AT [facility] ER    Low anterior resection syndrome 2022    FOLLOWED BY DR. PADMAJA ESPARZA    Lump of right breast     SWOLLEN LYMPH NODE    Lung cancer 2020    METASTATIC LUNG CANCER    Macrocytosis 2021    Monopolar depression     On anticoagulant therapy     Pain associated with surgical procedure 2020    Palmar-plantar erythrodysesthesia 2021    Perirectal abscess 2020    Pulmonary embolism without acute cor pulmonale 09/20/2019    X 3; ADMITTED TO [facility]    Pulmonary nodule, right 2020    Rectal cancer 2019    STAGE IIA, INVASIVE MODERATELY DIFFERENTIATED ADENOCARCINOMA, CHEMO AND XRT FINISHED 2019    Right shoulder pain 2020    SEEN AT [facility] ER    Right ureteral stone 10/01/2019    SEEN AT [facility] ER    SAB (spontaneous ) 1996     A2-1 INDUCED    Sciatica     Sepsis due to cellulitis 2002    LEFT GREAT TOE, ADMITTED TO [facility]    Sepsis due to cellulitis 2002    LEFT GREAT TOE, ADMITTED TO [facility]    Tachycardia 2020    Thrombosis of superior vena cava 2020    AND BRACHIOCEPHALIC VEIN, ADMITTED TO [facility]    Urinary urgency 2020       Past Surgical History:   Procedure Laterality Date    ABDOMINAL SURGERY      CHOLECYSTECTOMY N/A 10/10/2003    LAPAROSCOPIC WITH CHOLANGIOGRAM, DR. JAMEY TALAVERA AT [facility]    COLON RESECTION N/A 2019    Procedure: laparoscopic low anterior colon resection with mobilization of splenic flexure and diverting loop ileostomy: ERAS;  Surgeon: Padmaja Esparza MD;  Location: [facility] OR;  Service: General    COLON RESECTION N/A 2020    Procedure: LOW ANTERIOR COLON RESECTION, COLOANAL ANASTOMOSIS, MOBILIZATION SPLENIC FLEXURE;  Surgeon: Padmaja Esparza MD;   Location: [facility] OR;  Service: General;  Laterality: N/A;    COLONOSCOPY N/A 07/15/2020    PATENT ANASTAMOSIS IN MID RECTUM, RESCOPE IN 1 YR, DR. PADMAJA ESPARZA AT [facility]    COLONOSCOPY N/A 11/03/2022    DIFFUSE AREA OF MODERATELY FRIABLE MUCOSA IN ENTIRE COLON , DIVERSION COLITIS, PATENT AND HEALTHY ANASTAMOSIS, DR. PADMAJA ESPARZA AT [facility]    COLONOSCOPY W/ POLYPECTOMY N/A 07/08/2019    15 MM TUBULOVILLOUS ADENOMA POLYP IN HEPATIC FLEXURE, 20 MMTUBULOVILLOUS ADENOMA WITH HIGH GRADE DYSPLASIA IN RECTOSIGMOID, 4 CM MASS IN MID RECTUM, PATH: INVASIVE MODERATELY DIFFERENTIATED ADENOCARCINOMA, DR. JENNIFER LI AT [facility]    DILATATION AND EVACUATION N/A 2009    ENDOSCOPY N/A 07/08/2019    LA GRADE A ESOPHAGITIS, GASTRITIS, ALL BIOPSIES BENIGN, DR. JENNIFER LI AT [facility]    ILEOSTOMY CLOSURE N/A 11/14/2022    Procedure: ILEOSTOMY TAKEDOWN;  Surgeon: Padmaja Esparza MD;  Location: [facility] OR;  Service: General;  Laterality: N/A;    INCISION AND DRAINAGE PERIRECTAL ABSCESS N/A 08/14/2020    Procedure: INCISION AND DRAINAGE OF retrorectal dehiscence abcess with drain placement and irrigation;  Surgeon: Padmaja Esparza MD;  Location: [facility] OR;  Service: General;  Laterality: N/A;    PAP SMEAR N/A 01/24/2014    SIGMOIDOSCOPY N/A 07/24/2019    INFILTRATIVE PARTIALLY OBSTRUCTING LARGE RECTAL CANCER, AREA TATOOED, DR. PADMAJA ESPARZA AT [facility]    SIGMOIDOSCOPY N/A 11/23/2019    AN ULCERATED PARTIALLY OBSTRUCTING MASS IN MID RECTUM, AREA TATTOOED, DR. PADMAJA ESPARZA AT [facility]    SIGMOIDOSCOPY N/A 07/22/2021    PATENT ANASTAMOSIS IN DISTAL RECTUM, RESCOPE IN 1 YR, DR. PADMAJA ESPARZA AT [facility]    TRANSRECTAL ULTRASOUND N/A 07/24/2019    Procedure: ULTRASOUND TRANSRECTAL;  Surgeon: Padmaja Esparza MD;  Location: [facility] ENDOSCOPY;  Service: General    URETEROSCOPY LASER LITHOTRIPSY WITH STENT INSERTION Right 10/30/2019    DR. ESTUARDO BEASLEY AT [facility]    VAGINAL DELIVERY N/A 09/18/1998    VENOUS ACCESS DEVICE  (PORT) INSERTION Left 08/09/2019    Procedure: INSERTION VENOUS ACCESS DEVICE;  Surgeon: Sj Joseph MD;  Location: [facility];  Service: General    VENOUS ACCESS DEVICE (PORT) INSERTION N/A 12/18/2020    Procedure: INSERTION VENOUS ACCESS DEVICE right side, removal venous access device left side;  Surgeon: Sj Joseph MD;  Location: [facility] OR [facility];  Service: General;  Laterality: N/A;    WISDOM TOOTH EXTRACTION Bilateral 1993       Social History:   reports that she has been smoking electronic cigarette and cigarettes. She has a 25.00 pack-year smoking history. She has been exposed to tobacco smoke. She has never used smokeless tobacco. She reports that she does not currently use alcohol. She reports that she does not use drugs.      Marriage status:     Family History   Problem Relation Age of Onset    Hyperlipidemia Mother     Colon polyps Mother     Heart disease Mother     Hypertension Mother     Factor V Leiden deficiency Mother     Skin cancer Father         Squamous in 60s    Atrial fibrillation Father     Drug abuse Sister     Mental illness Sister         Addiction    No Known Problems Son     Colon cancer Maternal Uncle     Cancer Paternal Uncle     Colon cancer Paternal Uncle     Diabetes Paternal Uncle     Heart disease Paternal Grandfather     Cancer Paternal Aunt         OVARIAN    Malig Hyperthermia Neg Hx        No current facility-administered medications on file prior to encounter.     Current Outpatient Medications on File Prior to Encounter   Medication Sig Dispense Refill    clonazePAM (KlonoPIN) 1 MG tablet TAKE 1 TABLET BY MOUTH THREE TIMES A DAY AS NEEDED FOR ANXIETY 90 tablet 0    Cyanocobalamin (VITAMIN B-12 PO) Take 1 tablet by mouth Every Other Day.      dicyclomine (BENTYL) 20 MG tablet Take 1 tablet by mouth Every 6 (Six) Hours As Needed (for irritable bowel symptoms). Indications: Irritable Bowel Syndrome 360 tablet 2    diphenoxylate-atropine (Lomotil) 2.5-0.025  MG per tablet Take 2 tablets by mouth 4 (Four) Times a Day. 240 tablet 11    Enoxaparin Sodium (LOVENOX) 100 MG/ML solution prefilled syringe syringe INJECT 1 ML UNDER THE SKIN INTO THE APPROPRIATE AREA AS DIRECTED EVERY 12 (TWELVE) HOURS. 60 mL 2    escitalopram (LEXAPRO) 10 MG tablet TAKE 1 TABLET BY MOUTH EVERY DAY 90 tablet 1    famotidine (PEPCID) 20 MG tablet TAKE 1 TABLET BY MOUTH TWICE A  tablet 1    Loperamide HCl (IMODIUM PO) Take  by mouth.      Loratadine 10 MG capsule Take 1 capsule by mouth Every Evening.      metoprolol succinate XL (TOPROL-XL) 25 MG 24 hr tablet TAKE 0.5 TABLETS BY MOUTH EVERY NIGHT 45 tablet 3    ondansetron (ZOFRAN) 8 MG tablet TAKE 1 TABLET BY MOUTH THREE TIMES A DAY AS NEEDED FOR NAUSEA AND VOMITING 60 tablet 1    promethazine (PHENERGAN) 25 MG tablet Take 1 tablet by mouth Every 6 (Six) Hours As Needed for Nausea or Vomiting. 60 tablet 2    vitamin D (ERGOCALCIFEROL) 1.25 MG (57238 UT) capsule capsule Take 1 capsule by mouth 1 (One) Time Per Week.         Current Facility-Administered Medications:     acetaminophen (OFIRMEV) injection 1,000 mg, 1,000 mg, Intravenous, Q6H PRN, Padmaja Ye MD    Calcium Replacement - Follow Nurse / BPA Driven Protocol, , Does not apply, PRN, Padmaja Ye MD    diazePAM (VALIUM) injection 5 mg, 5 mg, Intravenous, Q6H PRN, Padmaja Ye MD, 5 mg at 07/22/23 2209    diphenhydrAMINE (BENADRYL) capsule 25 mg, 25 mg, Oral, Nightly PRN, Padmaja Ye MD    Enoxaparin Sodium (LOVENOX) syringe 100 mg, 1 mg/kg, Subcutaneous, Q12H, Pamdaja Ye MD, 100 mg at 07/22/23 2319    escitalopram (LEXAPRO) tablet 10 mg, 10 mg, Oral, Daily, Padmaja Ye MD    famotidine (PEPCID) injection 20 mg, 20 mg, Intravenous, Q12H, Padmaja Ye MD, 20 mg at 07/22/23 2319    HYDROmorphone (DILAUDID) injection 0.25 mg, 0.25 mg, Intravenous, Q2H PRN, Padmaja Ye MD, 0.25 mg at 07/23/23 0010    lactated ringers infusion, 100 mL/hr, Intravenous,  Continuous, Padmaja Ye MD, Last Rate: 100 mL/hr at 07/23/23 0606, 100 mL/hr at 07/23/23 0606    Magnesium Standard Dose Replacement - Follow Nurse / BPA Driven Protocol, , Does not apply, PRNEphraim Nechol L, MD    metoprolol tartrate (LOPRESSOR) injection 2.5 mg, 2.5 mg, Intravenous, Q6H, Padmaja Ye MD, 2.5 mg at 07/23/23 0247    [DISCONTINUED] morphine injection 1 mg, 1 mg, Intravenous, Q4H PRN **AND** naloxone (NARCAN) injection 0.4 mg, 0.4 mg, Intravenous, Q5 Min PRNEphraim Nechol L, MD    nicotine (NICODERM CQ) 21 MG/24HR patch 1 patch, 1 patch, Transdermal, Q24H, Padmaja Ye MD    nitroglycerin (NITROSTAT) SL tablet 0.4 mg, 0.4 mg, Sublingual, Q5 Min PRN, Padmaja Ye MD    ondansetron (ZOFRAN) injection 4 mg, 4 mg, Intravenous, Q6H PRN, Padmaja Ye MD, 4 mg at 07/22/23 2028    Phosphorus Replacement - Follow Nurse / BPA Driven Protocol, , Does not apply, Ephraim HAYWOOD Nechol L, MD    Potassium Replacement - Follow Nurse / BPA Driven Protocol, , Does not apply, Ephraim HAYWOOD Nechol L, MD    [COMPLETED] Insert Peripheral IV, , , Once **AND** sodium chloride 0.9 % flush 10 mL, 10 mL, Intravenous, PRNEphraim Nechol L, MD    sodium chloride 0.9 % flush 10 mL, 10 mL, Intravenous, Q12H, Padmaja Ye MD, 10 mL at 07/22/23 2100    sodium chloride 0.9 % flush 10 mL, 10 mL, Intravenous, PRNEphraim Nechol L, MD    sodium chloride 0.9 % infusion 40 mL, 40 mL, Intravenous, PRNEphraim Nechol L, MD    Allergy  Patient has no known allergies.    Review of Systems   Constitutional: Positive for decreased appetite. Negative for fever.   Gastrointestinal:  Negative for abdominal pain, nausea and vomiting.   All other systems reviewed and are negative.    Vitals:    07/23/23 0748   BP: 146/73   Pulse: 65   Resp:    Temp: 98.6 °F (37 °C)   SpO2: 96%     Body mass index is 31.3 kg/m².    Physical Exam  Constitutional:       General: She is not in acute distress.     Appearance: She is well-developed.    HENT:      Head: Normocephalic and atraumatic.   Abdominal:      General: There is no distension.      Palpations: Abdomen is soft.      Tenderness: There is no abdominal tenderness. There is no guarding.   Musculoskeletal:         General: Normal range of motion.   Neurological:      Mental Status: She is alert.   Psychiatric:         Thought Content: Thought content normal.            Review of Medical Record:  I reviewed labs which were within normal limits.  I reviewed CT scan which showed some mildly dilated small bowel loops with a transition in the right lower quadrant.  There is stool in the colon.    Assessment:  1. Partial small bowel obstruction        Plan: Admit.  Support with IV fluid.  NG tube was to low wall suction come out several centimeters based on x-ray.  Since she is having bowel movements have remove it.  Encourage ambulation.  Minimize narcotics.  Continue anticoagulation because of her factor V Leiden mutation and extensive clotting history.    Electronically signed by Padmaja Ye MD at 07/23/23 1122          Emergency Department Notes        Violeta Byrd, RN at 07/22/23 1716          Nursing report ED to floor  Carole Weaver  42 y.o.  female    HPI :   Chief Complaint   Patient presents with    Abdominal Pain    Constipation    Vomiting       Admitting doctor:   Padmaja Ye MD    Admitting diagnosis:   The encounter diagnosis was Partial small bowel obstruction.    Code status:   Current Code Status       Date Active Code Status Order ID Comments User Context       Prior            Allergies:   Patient has no known allergies.    Isolation:   No active isolations    Intake and Output    Intake/Output Summary (Last 24 hours) at 7/22/2023 1716  Last data filed at 7/22/2023 1459  Gross per 24 hour   Intake 1000 ml   Output --   Net 1000 ml       Weight:       07/22/23  1419   Weight: 87.1 kg (192 lb)       Most recent vitals:   Vitals:    07/22/23 1444 07/22/23 1536 07/22/23  1627 07/22/23 1652   BP: (!) 173/111      Pulse:  73 78 73   Resp:       Temp:       SpO2:  97% 97% 97%   Weight:       Height:           Active LDAs/IV Access:   Lines, Drains & Airways       Active LDAs       Name Placement date Placement time Site Days    Peripheral IV 07/22/23 1449 Posterior;Right Hand 07/22/23  1449  Hand  less than 1    Single Lumen Implantable Port 12/18/20 Right Chest 12/18/20  1200  Chest  946                    Labs (abnormal labs have a star):   Labs Reviewed   COMPREHENSIVE METABOLIC PANEL - Abnormal; Notable for the following components:       Result Value    Glucose 128 (*)     All other components within normal limits    Narrative:     GFR Normal >60  Chronic Kidney Disease <60  Kidney Failure <15     CBC WITH AUTO DIFFERENTIAL - Abnormal; Notable for the following components:    RBC 5.63 (*)     Hemoglobin 17.4 (*)     Hematocrit 50.8 (*)     Neutrophil % 83.9 (*)     Lymphocyte % 12.1 (*)     Monocyte % 3.3 (*)     All other components within normal limits   CBC AND DIFFERENTIAL    Narrative:     The following orders were created for panel order CBC & Differential.  Procedure                               Abnormality         Status                     ---------                               -----------         ------                     CBC Auto Differential[[id number]]        Abnormal            Final result                 Please view results for these tests on the individual orders.       EKG:   No orders to display       Meds given in ED:   Medications   sodium chloride 0.9 % flush 10 mL (has no administration in time range)   HYDROmorphone (DILAUDID) injection 0.5 mg (0.5 mg Intravenous Given 7/22/23 1502)   ondansetron (ZOFRAN) injection 4 mg (4 mg Intravenous Given 7/22/23 1501)   sodium chloride 0.9 % bolus 1,000 mL ( Intravenous Currently Infusing 7/22/23 1646)   iopamidol (ISOVUE-300) 61 % injection 100 mL (85 mL Intravenous Given by Other 7/22/23 1609)   HYDROmorphone  (DILAUDID) injection 0.5 mg (0.5 mg Intravenous Given 7/22/23 1650)       Imaging results:  No radiology results for the last day    Ambulatory status:   - up with assist    Social issues:   Social History     Socioeconomic History    Marital status:      Spouse name: Justus    Number of children: 1    Years of education: College   Tobacco Use    Smoking status: Every Day     Packs/day: 1.00     Years: 25.00     Pack years: 25.00     Types: Electronic Cigarette, Cigarettes     Passive exposure: Current    Smokeless tobacco: Never    Tobacco comments:     1 PPD/caffeine use    Vaping Use    Vaping Use: Some days    Substances: Nicotine    Devices: Disposable    Passive vaping exposure: Yes   Substance and Sexual Activity    Alcohol use: Not Currently     Comment: RARELY    Drug use: Never    Sexual activity: Yes     Partners: Male     Comment: .       NIH Stroke Scale:       Violeta Byrd RN  07/22/23 17:16 EDT          Electronically signed by Violeta Byrd RN at 07/22/23 1716       Black Kitchen MD at 07/22/23 1511           EMERGENCY DEPARTMENT ENCOUNTER    Room Number:  23/23  PCP: Deepika Vela III, NP-C  Patient Care Team:  Deepika Vela III, NP-C as PCP - General (Family Medicine)  Padmaja Ye MD as Referring Physician (Colon and Rectal Surgery)  Beatrice Lau MD as Consulting Physician (Radiation Oncology)  Dong Nguyen MD PhD as Consulting Physician (Hematology and Oncology)  Wendy Cottrell MD as Consulting Physician (Obstetrics and Gynecology)  Tasha Bustos MD as Consulting Physician (Cardiology)  Charissa Messina PA-C as Physician Assistant (Colon and Rectal Surgery)  Lurdes Tucker PA-C as Physician Assistant (Physician Assistant)   Independent Historians: Patient    HPI:  Chief Complaint: Acute abdominal pain    A complete HPI/ROS/PMH/PSH/SH/FH are unobtainable due to: Nothing    Chronic or social conditions impacting  patient care (Social Determinants of Health): None  (Financial Resource Strain / Food Insecurity / Transportation Needs / Physical Activity / Stress / Social Connections / Intimate Partner Violence / Housing Stability)    Context: Carole Weaver is a 42 y.o. female who presents to the ED c/o acute abdominal pain.  The patient reports that 2 days ago she started having abdominal pain as well as nausea and vomiting.  She reports her last bowel movement was 4 days ago.  She states that is not typical for her.  She states that she had an ileostomy reversed in November.  She states she typically has frequent bowel movements and any sort of constipation is abnormal for her.  She reports that she has had vomiting of dark vomit today.  She states that it smells bad.  She states she is unsure if it is stool or if it is blood.  She reports diffuse severe pain.    Review of prior external notes (non-ED) -and- Review of prior external test results outside of this encounter: Oncology note dated 7/14/2023 notes that she is maintained on Lovenox every 12 hours.  She has factor V Leiden.  She has metastatic rectal cancer.  They note that she was concerning for disease progression.  The guardant reveal for circulating tumor DNA study was recommended.  That study is still pending in the laboratory.    Prescription drug monitoring program review:         PAST MEDICAL HISTORY  Active Ambulatory Problems     Diagnosis Date Noted    Anxiety 08/09/2016    Irritable bowel syndrome 08/09/2016    Monopolar depression 05/09/2019    Adenocarcinoma of rectum 07/12/2019    Family history of factor V Leiden mutation 08/01/2019    Heterozygous factor V Leiden mutation 08/02/2019    Encounter for fitting and adjustment of vascular catheter 08/07/2019    Functional diarrhea 09/03/2019    Adverse effect of antineoplastic and immunosuppressive drugs, initial encounter 09/03/2019    Hypokalemia 09/03/2019    Hypomagnesemia 09/03/2019    Other pulmonary  embolism without acute cor pulmonale 09/20/2019    Kidney stone on right side 10/07/2019    Hydronephrosis with ureteropelvic junction (UPJ) obstruction 10/15/2019    Partial intestinal obstruction, unspecified as to cause 07/08/2019    Irregular heart beat 12/10/2019    Hemorrhoids, external 12/10/2019    Gastrointestinal hemorrhage, unspecified 07/08/2019    Esophagitis 07/08/2019    Family history of colonic polyps 12/10/2019    Chronic diarrhea 12/10/2019    Calculus of gallbladder 12/10/2019    Benign neoplasm of transverse colon 07/08/2019    Benign neoplasm of rectum and anal canal 07/08/2019    Loss of weight 12/10/2019    Heart murmur 12/10/2019    Anemia 01/22/2020    Anticoagulation adequate 01/22/2020    Iron deficiency 02/05/2020    Adverse effect of iron 02/05/2020    Drug-induced peripheral neuropathy 04/15/2020    On antineoplastic chemotherapy 04/25/2020    Chemotherapy-induced thrombocytopenia 04/25/2020    HTN (hypertension) 04/25/2020    Ileostomy present 04/25/2020    Chronic anticoagulation 04/25/2020    Chemotherapy-induced neutropenia 04/29/2020    Hand foot syndrome 05/13/2020    Pulmonary nodule, right 05/13/2020    Abdominopelvic abscess 08/12/2020    Perirectal abscess 08/12/2020    Pulmonary nodules 09/16/2020    Secondary cancer of lung 10/05/2020    Leukocytopenia 11/10/2020    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    Tachycardia 12/23/2020    History of rectal cancer 05/19/2021    Macrocytosis without anemia 06/22/2021    Sciatica     Right ureteral stone 10/01/2019    Right shoulder pain 11/16/2020    Pulmonary embolism without acute cor pulmonale 09/20/2019    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    On anticoagulant therapy     Macrocytosis 07/2021    Lump of right breast     Kidney stones 08/09/2007    Ileostomy in place     IBS (irritable bowel syndrome)     HPV in female 1998    History of radiation therapy 2019    History of gestational diabetes     History of  chemotherapy 2019    History of anemia     Hemorrhoids     GERD (gastroesophageal reflux disease)     Fistula of intestine     Colon polyps     Chemotherapy-induced nausea 2020    Chemotherapy induced diarrhea 2021    Cervical lymphadenitis 2012    Bilateral hand swelling 2020    Bilateral epiphora 2020    Abnormal Pap smear of cervix 1998    Family history of colon cancer 10/05/2022    Neck pain on left side 2023     Resolved Ambulatory Problems     Diagnosis Date Noted    Immunization due 2018    UTI (urinary tract infection) 2019    Rectal cancer 2019    Hematochezia 12/10/2019    Pain associated with surgical procedure 2020    Rectal cancer 2020    Rectal cancer 2020    Hyponatremia 2020    Malignant neoplasm of colon 01/15/2021    Dysuria 2021    Urinary urgency 2020    Sepsis due to cellulitis (HCC) 2002    SAB (spontaneous ) 1996    Rectal cancer 2019    Lung cancer (HCC) 2020    Infected insect bite of neck 2016    History of TB skin testing 2009    History of pre-eclampsia     Factor V Leiden mutation (HCC)     Depression     Cystitis 2020    Cystitis 10/27/2020    Chemotherapy induced neutropenia (HCC) 2020    Anemia in pregnancy      Past Medical History:   Diagnosis Date    Abscess of abdominal wall 2012    Coronary artery calcification     COVID-19 2022    Diversion colitis 2022    Gallstones     Hearing loss     History of transfusion     Low anterior resection syndrome 2022         PAST SURGICAL HISTORY  Past Surgical History:   Procedure Laterality Date    ABDOMINAL SURGERY      CHOLECYSTECTOMY N/A 10/10/2003    LAPAROSCOPIC WITH CHOLANGIOGRAM, DR. JAMEY TALAVERA AT [facility]    COLON RESECTION N/A 2019    Procedure: laparoscopic low anterior colon resection with mobilization of splenic flexure and diverting loop ileostomy: ERAS;  Surgeon:  Padmaja Esparza MD;  Location: [facility] OR;  Service: General    COLON RESECTION N/A 08/03/2020    Procedure: LOW ANTERIOR COLON RESECTION, COLOANAL ANASTOMOSIS, MOBILIZATION SPLENIC FLEXURE;  Surgeon: Padmaja Esparza MD;  Location: [facility] MAIN OR;  Service: General;  Laterality: N/A;    COLONOSCOPY N/A 07/15/2020    PATENT ANASTAMOSIS IN MID RECTUM, RESCOPE IN 1 YR, DR. PADMAJA ESPARZA AT [facility]    COLONOSCOPY N/A 11/03/2022    DIFFUSE AREA OF MODERATELY FRIABLE MUCOSA IN ENTIRE COLON , DIVERSION COLITIS, PATENT AND HEALTHY ANASTAMOSIS, DR. PADMAJA ESPARZA AT [facility]    COLONOSCOPY W/ POLYPECTOMY N/A 07/08/2019    15 MM TUBULOVILLOUS ADENOMA POLYP IN HEPATIC FLEXURE, 20 MMTUBULOVILLOUS ADENOMA WITH HIGH GRADE DYSPLASIA IN RECTOSIGMOID, 4 CM MASS IN MID RECTUM, PATH: INVASIVE MODERATELY DIFFERENTIATED ADENOCARCINOMA, DR. JENNIFER LI AT [facility]    DILATATION AND EVACUATION N/A 2009    ENDOSCOPY N/A 07/08/2019    LA GRADE A ESOPHAGITIS, GASTRITIS, ALL BIOPSIES BENIGN, DR. JENNIFER LI AT [facility]    ILEOSTOMY CLOSURE N/A 11/14/2022    Procedure: ILEOSTOMY TAKEDOWN;  Surgeon: Padmaja Esparza MD;  Location: [facility] OR;  Service: General;  Laterality: N/A;    INCISION AND DRAINAGE PERIRECTAL ABSCESS N/A 08/14/2020    Procedure: INCISION AND DRAINAGE OF retrorectal dehiscence abcess with drain placement and irrigation;  Surgeon: Padmaja Esparza MD;  Location: [facility];  Service: General;  Laterality: N/A;    PAP SMEAR N/A 01/24/2014    SIGMOIDOSCOPY N/A 07/24/2019    INFILTRATIVE PARTIALLY OBSTRUCTING LARGE RECTAL CANCER, AREA TATOOED, DR. PADMAJA ESPARZA AT [facility]    SIGMOIDOSCOPY N/A 11/23/2019    AN ULCERATED PARTIALLY OBSTRUCTING MASS IN MID RECTUM, AREA TATTOOED, DR. PADMAJA ESPARZA AT [facility]    SIGMOIDOSCOPY N/A 07/22/2021    PATENT ANASTAMOSIS IN DISTAL RECTUM, RESCOPE IN 1 YR, DR. PADMAJA ESPARZA AT [facility]    TRANSRECTAL ULTRASOUND N/A 07/24/2019    Procedure: ULTRASOUND TRANSRECTAL;  Surgeon: Ephraim  Padmaja MASSEY MD;  Location: [facility] ENDOSCOPY;  Service: General    URETEROSCOPY LASER LITHOTRIPSY WITH STENT INSERTION Right 10/30/2019    DR. ESTUARDO BEASLEY AT [place]    VAGINAL DELIVERY N/A 09/18/1998    VENOUS ACCESS DEVICE (PORT) INSERTION Left 08/09/2019    Procedure: INSERTION VENOUS ACCESS DEVICE;  Surgeon: Sj Joseph MD;  Location:  [facility];  Service: General    VENOUS ACCESS DEVICE (PORT) INSERTION N/A 12/18/2020    Procedure: INSERTION VENOUS ACCESS DEVICE right side, removal venous access device left side;  Surgeon: Sj Joseph MD;  Location: [facility] OR [facility];  Service: General;  Laterality: N/A;    WISDOM TOOTH EXTRACTION Bilateral 1993         FAMILY HISTORY  Family History   Problem Relation Age of Onset    Hyperlipidemia Mother     Colon polyps Mother     Heart disease Mother     Hypertension Mother     Factor V Leiden deficiency Mother     Skin cancer Father         Squamous in 60s    Atrial fibrillation Father     Drug abuse Sister     Mental illness Sister         Addiction    No Known Problems Son     Colon cancer Maternal Uncle     Cancer Paternal Uncle     Colon cancer Paternal Uncle     Diabetes Paternal Uncle     Heart disease Paternal Grandfather     Cancer Paternal Aunt         OVARIAN    Malig Hyperthermia Neg Hx          SOCIAL HISTORY  Social History     Socioeconomic History    Marital status:      Spouse name: Justus    Number of children: 1    Years of education: College   Tobacco Use    Smoking status: Every Day     Packs/day: 1.00     Years: 25.00     Pack years: 25.00     Types: Electronic Cigarette, Cigarettes     Passive exposure: Current    Smokeless tobacco: Never    Tobacco comments:     1 PPD/caffeine use    Vaping Use    Vaping Use: Some days    Substances: Nicotine    Devices: Disposable    Passive vaping exposure: Yes   Substance and Sexual Activity    Alcohol use: Not Currently     Comment: RARELY    Drug use: Never    Sexual activity: Yes      Partners: Male     Comment: .         ALLERGIES  Patient has no known allergies.        REVIEW OF SYSTEMS  Review of Systems  Included in HPI  All systems reviewed and negative except for those discussed in HPI.      PHYSICAL EXAM    I have reviewed the triage vital signs and nursing notes.    ED Triage Vitals   Temp Heart Rate Resp BP SpO2   07/22/23 1419 07/22/23 1419 07/22/23 1419 07/22/23 1444 07/22/23 1419   97 °F (36.1 °C) (!) 122 18 (!) 173/111 97 %      Temp src Heart Rate Source Patient Position BP Location FiO2 (%)   -- -- -- -- --              Physical Exam  GENERAL: Awake, alert, appears uncomfortable, vomiting  SKIN: Warm, dry  HENT: Normocephalic, atraumatic  EYES: no scleral icterus  CV: regular rhythm, regular rate  RESPIRATORY: normal effort, lungs clear  ABDOMEN: soft, generalized tenderness, distended  MUSCULOSKELETAL: no deformity  NEURO: alert, moves all extremities, follows commands                                                               LAB RESULTS  Recent Results (from the past 24 hour(s))   Comprehensive Metabolic Panel    Collection Time: 07/22/23  2:53 PM    Specimen: Blood   Result Value Ref Range    Glucose 128 (H) 65 - 99 mg/dL    BUN 7 6 - 20 mg/dL    Creatinine 0.76 0.57 - 1.00 mg/dL    Sodium 140 136 - 145 mmol/L    Potassium 4.0 3.5 - 5.2 mmol/L    Chloride 104 98 - 107 mmol/L    CO2 22.0 22.0 - 29.0 mmol/L    Calcium 10.1 8.6 - 10.5 mg/dL    Total Protein 7.7 6.0 - 8.5 g/dL    Albumin 4.3 3.5 - 5.2 g/dL    ALT (SGPT) 25 1 - 33 U/L    AST (SGOT) 18 1 - 32 U/L    Alkaline Phosphatase 108 39 - 117 U/L    Total Bilirubin 0.5 0.0 - 1.2 mg/dL    Globulin 3.4 gm/dL    A/G Ratio 1.3 g/dL    BUN/Creatinine Ratio 9.2 7.0 - 25.0    Anion Gap 14.0 5.0 - 15.0 mmol/L    eGFR 99.9 >60.0 mL/min/1.73   CBC Auto Differential    Collection Time: 07/22/23  2:53 PM    Specimen: Blood   Result Value Ref Range    WBC 7.26 3.40 - 10.80 10*3/mm3    RBC 5.63 (H) 3.77 - 5.28 10*6/mm3     Hemoglobin 17.4 (H) 12.0 - 15.9 g/dL    Hematocrit 50.8 (H) 34.0 - 46.6 %    MCV 90.2 79.0 - 97.0 fL    MCH 30.9 26.6 - 33.0 pg    MCHC 34.3 31.5 - 35.7 g/dL    RDW 13.3 12.3 - 15.4 %    RDW-SD 44.5 37.0 - 54.0 fl    MPV 9.2 6.0 - 12.0 fL    Platelets 261 140 - 450 10*3/mm3    Neutrophil % 83.9 (H) 42.7 - 76.0 %    Lymphocyte % 12.1 (L) 19.6 - 45.3 %    Monocyte % 3.3 (L) 5.0 - 12.0 %    Eosinophil % 0.3 0.3 - 6.2 %    Basophil % 0.1 0.0 - 1.5 %    Immature Grans % 0.3 0.0 - 0.5 %    Neutrophils, Absolute 6.09 1.70 - 7.00 10*3/mm3    Lymphocytes, Absolute 0.88 0.70 - 3.10 10*3/mm3    Monocytes, Absolute 0.24 0.10 - 0.90 10*3/mm3    Eosinophils, Absolute 0.02 0.00 - 0.40 10*3/mm3    Basophils, Absolute 0.01 0.00 - 0.20 10*3/mm3    Immature Grans, Absolute 0.02 0.00 - 0.05 10*3/mm3    nRBC 0.0 0.0 - 0.2 /100 WBC       Ordered the above labs and independently reviewed the results.        RADIOLOGY  CT Abdomen Pelvis With Contrast    Result Date: 7/22/2023  CT SCAN OF THE ABDOMEN AND PELVIS WITH INTRAVENOUS CONTRAST  HISTORY: Abdominal pain and vomiting. Stage IV colon cancer.  FINDINGS: The CT scan was performed as an emergency procedure through the abdomen and pelvis with intravenous contrast. As compared to multiple previous CT scans of the abdomen and pelvis including the most recent dated 07/07/2023. The following findings are present: 1. There is mild-to-moderate dilatation of fluid-filled small bowel loops measuring up to 3.3 cm with decompressed small bowel distally that is highly suspicious for partial small bowel obstruction. This is new since 07/07/2023 and the obstruction appears to be located in the lower right abdomen. 2. The lung bases are clear. There is mild diffuse fatty infiltration of the liver and no focal liver lesions are seen. The spleen, pancreas, both adrenal glands, both kidneys are unremarkable. The gallbladder has been removed. 3. There is no aortic aneurysm or retroperitoneal  lymphadenopathy. In the pelvis there is prominent soft tissue thickening in the presacral space that is unchanged from multiple previous studies dating back to at least 11/29/2021. 4. At bone windows, there are several sclerotic lesions in the left iliac bone extending near the acetabulum but these remain stable.      Radiation dose reduction techniques were utilized, including automated exposure control and exposure modulation based on body size.  This report was finalized on 7/22/2023 5:16 PM by Dr. Nikunj Umaña M.D.       I ordered the above noted radiological studies. Reviewed by me and discussed with radiologist.  See dictation for official radiology interpretation.      PROCEDURES    Procedures      MEDICATIONS GIVEN IN ER    Medications   sodium chloride 0.9 % flush 10 mL (has no administration in time range)   diazePAM (VALIUM) injection 2.5 mg (has no administration in time range)   HYDROmorphone (DILAUDID) injection 0.5 mg (0.5 mg Intravenous Given 7/22/23 1502)   ondansetron (ZOFRAN) injection 4 mg (4 mg Intravenous Given 7/22/23 1501)   sodium chloride 0.9 % bolus 1,000 mL ( Intravenous Canceled Entry 7/22/23 1646)   iopamidol (ISOVUE-300) 61 % injection 100 mL (85 mL Intravenous Given by Other 7/22/23 1609)   HYDROmorphone (DILAUDID) injection 0.5 mg (0.5 mg Intravenous Given 7/22/23 1650)         ORDERS PLACED DURING THIS VISIT:  Orders Placed This Encounter   Procedures    CT Abdomen Pelvis With Contrast    Comprehensive Metabolic Panel    CBC Auto Differential    Monitor Blood Pressure    Pulse Oximetry, Continuous    Nasogastric tube insertion    Discontinue Cardiac Monitoring    NG Tube to Low Wall Suction    Code Status and Medical Interventions:    IP General Consult (Use specialty-specific consult if known)    Insert Peripheral IV    Inpatient Admission    CBC & Differential         PROGRESS, DATA ANALYSIS, CONSULTS, AND MEDICAL DECISION MAKING    All labs have been independently interpreted  by me.  All radiology studies have been reviewed by me and discussed with radiologist dictating the report.   EKG's independently viewed and interpreted by me.  Discussion below represents my analysis of pertinent findings related to patient's condition, differential diagnosis, treatment plan and final disposition.    Differential diagnosis includes but is not limited to bowel obstruction, upper GI bleed, lower GI bleed.    ED Course as of 07/22/23 1818   Sat Jul 22, 2023   1647 CT Abdomen Pelvis With Contrast  My independent interpretation of the CT of the abdomen pelvis is bowel obstruction [TR]   1648 CT Abdomen Pelvis With Contrast  Discussing with the radiologist by phone.  CT of the abdomen pelvis shows partial small bowel obstruction. [TR]   1650 I reviewed the work-up and findings with the patient and family at the bedside.  Answered all questions.  Plan admission.  She is agreeable. [TR]   1709 Discussing with Dr Esparza with colorectal surgery.  She agrees to admit.  She requests a 4 E. bed. [TR]   1818 The patient is reporting some anxiety related to the NG tube.  IV Valium ordered. [TR]      ED Course User Index  [TR] Black Kitchen MD             AS OF 18:18 EDT VITALS:    BP - (!) 173/111  HR - 74  TEMP - 97 °F (36.1 °C)  O2 SATS - 94%        DIAGNOSIS  Final diagnoses:   Partial small bowel obstruction         DISPOSITION  ED Disposition       ED Disposition   Decision to Admit    Condition   --    Comment   Level of Care: Med/Surg [1]   Diagnosis: Partial small bowel obstruction [028878]   Admitting Physician: GERONIMO ESPARZA [[id number]]   Attending Physician: GERONIMO ESPARZA [[id number]]   Bed Request Comments: 4 east if available   Certification: I Certify That Inpatient Hospital Services Are Medically Necessary For Greater Than 2 Midnights                    Note Disclaimer: At [facility], we believe that sharing information builds trust and better relationships. You are receiving this note because you  recently visited [facility]. It is possible you will see health information before a provider has talked with you about it. This kind of information can be easy to misunderstand. To help you fully understand what it means for your health, we urge you to discuss this note with your provider.         Black Kitchen MD  07/22/23 1711       Black Kitchen MD  07/22/23 1818      Electronically signed by Black Kitchen MD at 07/22/23 1818       Gely Paul, RN at 07/22/23 1416          Patient from home via PV reporting lower abdominal pain x 2 days. States her last BM was 4 days ago, today she vomited dark brown emesis. Patient states she has stage 4 colon cancer, had an ileostomy reversed in 11/22. States she normally has frequent BM's.     Electronically signed by Gely Paul, RN at 07/22/23 1418       Operative/Procedure Notes (last 72 hours)  Notes from 07/21/23 1154 through 07/24/23 1154   No notes of this type exist for this encounter.          Physician Progress Notes (last 72 hours)        Charissa Messina PA-C at 07/24/23 0744       Attestation signed by Padmaja Ye MD at 07/24/23 0853    I have reviewed this documentation and agree.                  Progress Note      S: Had ~8 BMs last night, which is normal per Pt. Is receiving IVF, but expresses concerns about having an accident as a result of being attached to the IV pole with delay getting to the bathroom. Tolerating ice chips without N/V. Slight tenderness to the LLQ, but denies any significant pain or bloating.     O:  Temp:  [97.9 °F (36.6 °C)-98.7 °F (37.1 °C)] 98.4 °F (36.9 °C)  Heart Rate:  [56-72] 69  Resp:  [17-18] 17  BP: (122-146)/(61-80) 142/75      Intake/Output Summary (Last 24 hours) at 7/24/2023 0744  Last data filed at 7/24/2023 0254  Gross per 24 hour   Intake 1000 ml   Output 300 ml   Net 700 ml       Abd:   soft, non-distended      Results from last 7 days   Lab Units 07/24/23  0450   SODIUM mmol/L 139    POTASSIUM mmol/L 3.6   CHLORIDE mmol/L 107   CO2 mmol/L 23.9   BUN mg/dL 9   CREATININE mg/dL 0.60   EGFR mL/min/1.73 114.4   GLUCOSE mg/dL 79   CALCIUM mg/dL 8.5*   PHOSPHORUS mg/dL 2.0*       Results from last 7 days   Lab Units 07/24/23  0450   MAGNESIUM mg/dL 2.1       A/P: 42 y.o. female with partial small bowel obstruction. Improving.     - Full liquid diet. If tolerates this, can advance to a regular diet for lunch.  - D/C IVF.  - Phosphorus 2.0. Will replace.   - Continue Lovenox due to Hx of Factor V Leiden  - Possible D/C home this afternoon             Electronically signed by Padmaja Ye MD at 07/24/23 0801       Consult Notes (last 72 hours)  Notes from 07/21/23 1154 through 07/24/23 1154   No notes of this type exist for this encounter.

## 2023-07-24 NOTE — DISCHARGE SUMMARY
Date of Admission: 7/22/2023  Date of Discharge:  07/24/23     Presenting Problem/History of Present Illness  Partial small bowel obstruction [K56.600]     Discharge Diagnosis:  Active Hospital Problems    Diagnosis  POA    **Partial small bowel obstruction [K56.600]  Yes      Resolved Hospital Problems   No resolved problems to display.       Hospital Course  Patient is a 42 y.o. female with metastatic rectal cancer and Factor V Leiden (receiving Lovenox BID) presented to the [facility] ED on 07/22/2023 for increased abdominal pain, N/V, and constipation. CT showed mild-to-moderate dilation of the small bowel with decompression distally, concerning for partial SBO. NGT was placed and Pt admitted and started on IVF. The following day, she started having bowel function and NGT was removed. She was started on a clear liquid diet and slowly advanced. She is tolerating a regular diet without N/V. Having frequent BMs. Pt comfortable being D/C home today.     Consults:   Consults       No orders found from 6/23/2023 to 7/23/2023.            Discharge Disposition  Home or Self Care    Discharge Medications     Discharge Medications        New Medications        Instructions Start Date   acetaminophen 325 MG tablet  Commonly known as: TYLENOL   650 mg, Oral, Every 6 Hours PRN             Continue These Medications        Instructions Start Date   clonazePAM 1 MG tablet  Commonly known as: KlonoPIN   TAKE 1 TABLET BY MOUTH THREE TIMES A DAY AS NEEDED FOR ANXIETY      dicyclomine 20 MG tablet  Commonly known as: BENTYL   20 mg, Oral, Every 6 Hours PRN      diphenoxylate-atropine 2.5-0.025 MG per tablet  Commonly known as: Lomotil   2 tablets, Oral, 4 Times Daily      Enoxaparin Sodium 100 MG/ML solution prefilled syringe syringe  Commonly known as: LOVENOX   1 mg/kg (100 mg), Subcutaneous, Every 12 Hours Scheduled      escitalopram 10 MG tablet  Commonly known as: LEXAPRO   TAKE 1 TABLET BY MOUTH EVERY DAY      famotidine 20 MG  tablet  Commonly known as: PEPCID   TAKE 1 TABLET BY MOUTH TWICE A DAY      IMODIUM PO   Oral      Loratadine 10 MG capsule   10 mg, Oral, Every Evening      metoprolol succinate XL 25 MG 24 hr tablet  Commonly known as: TOPROL-XL   12.5 mg, Oral, Nightly      ondansetron 8 MG tablet  Commonly known as: ZOFRAN   TAKE 1 TABLET BY MOUTH THREE TIMES A DAY AS NEEDED FOR NAUSEA AND VOMITING      promethazine 25 MG tablet  Commonly known as: PHENERGAN   25 mg, Oral, Every 6 Hours PRN      VITAMIN B-12 PO   1 tablet, Oral, Every 48 Hours      vitamin D 1.25 MG (71450 UT) capsule capsule  Commonly known as: ERGOCALCIFEROL   50,000 Units, Oral, Weekly               Discharge Diet:     Activity at Discharge:   Activity Instructions       Driving Restrictions      Type of Restriction: Driving    Driving Restrictions: No Driving Until Next Appointment    Gradually Increase Activity Until at Pre-Hospitalization Level      Lifting Restrictions      Type of Restriction: Lifting    Lifting Restrictions: Lifting Restriction (Indicate Limit)    Weight Limit (Pounds): 15    Length of Lifting Restriction: until office appt            Follow-up Appointments  Future Appointments   Date Time Provider Department Center   9/13/2023  9:30 AM Deepika Vela III NP-C [facility]   11/15/2023  8:40 AM [facility]     Additional Instructions for the Follow-ups that You Need to Schedule       Call MD With Problems / Concerns   As directed      Instructions: temp >101. Drainage from wound. vomitting.    Order Comments: Instructions: temp >101. Drainage from wound. vomitting.          Discharge Follow-up with Specified Provider: Ephraim; 2 Weeks   As directed      To: Ephraim    Follow Up: 2 Weeks                 Discharge Diet:   As tolerated  Activity at Discharge:   As tolerated  Follow-up Appointments  2 wks

## 2023-07-24 NOTE — OUTREACH NOTE
Prep Survey      Flowsheet Row Responses   [facility] facility patient discharged from? [facility]   Is LACE score < 7 ? No   Eligibility [facility]   Date of Admission 07/22/23   Date of Discharge 07/24/23   Discharge Disposition Home or Self Care   Discharge diagnosis Partial small bowel obstruction   Does the patient have one of the following disease processes/diagnoses(primary or secondary)? Other   Does the patient have Home health ordered? No   Is there a DME ordered? No   Prep survey completed? Yes            Nikole JASMINE - Registered Nurse

## 2023-07-25 ENCOUNTER — TRANSITIONAL CARE MANAGEMENT TELEPHONE ENCOUNTER (OUTPATIENT)
Dept: CALL CENTER | Facility: HOSPITAL | Age: 43
End: 2023-07-25
Payer: COMMERCIAL

## 2023-07-25 ENCOUNTER — TELEPHONE (OUTPATIENT)
Dept: SURGERY | Facility: CLINIC | Age: 43
End: 2023-07-25
Payer: COMMERCIAL

## 2023-07-25 DIAGNOSIS — F41.9 ANXIETY: ICD-10-CM

## 2023-07-25 RX ORDER — CLONAZEPAM 1 MG/1
TABLET ORAL
Qty: 90 TABLET | Refills: 0 | Status: SHIPPED | OUTPATIENT
Start: 2023-07-25

## 2023-07-25 NOTE — CASE MANAGEMENT/SOCIAL WORK
Case Management Discharge Note      Final Note: Home with family. No needs. Transport by private auto.         Selected Continued Care - Discharged on 7/24/2023 Admission date: 7/22/2023 - Discharge disposition: Home or Self Care      Destination    No services have been selected for the patient.                Durable Medical Equipment    No services have been selected for the patient.                Dialysis/Infusion    No services have been selected for the patient.                Home Medical Care    No services have been selected for the patient.                Therapy    No services have been selected for the patient.                Community Resources    No services have been selected for the patient.                Community & DME    No services have been selected for the patient.                    Transportation Services  Private: Car    Final Discharge Disposition Code: 01 - home or self-care

## 2023-07-25 NOTE — TELEPHONE ENCOUNTER
Pt called to set up 2 week hospital follow up, scheduled this with Dr. Ye per discharge summary. Pt is very nevous because she has not had a bowel movement since she has been discharged which is not normal for her. She says she had a couple episodes of diarrhea in the hospital and Dr. Ye prescribed 2 imodium, she says she was able to eat her lunch and felt fine with no nausea or diarrhea. She ate dinner when she got home yesterday and still has not had a bowel movement. She has had waves of abd cramping that come and go but nothing severe per pt. She says she has not taken imodium or Lomotil, states she was instructed to reintroduce but due to her not using the restroom she did not want to take yet. Pt requested you to call back to discuss because she is very nervous.

## 2023-07-25 NOTE — TELEPHONE ENCOUNTER
Rx Refill Note  Requested Prescriptions     Pending Prescriptions Disp Refills    clonazePAM (KlonoPIN) 1 MG tablet [Pharmacy Med Name: CLONAZEPAM 1 MG TABLET] 90 tablet 0     Sig: TAKE 1 TABLET BY MOUTH THREE TIMES A DAY AS NEEDED FOR ANXIETY      Last office visit with prescribing clinician: 6/12/2023   Last telemedicine visit with prescribing clinician: Visit date not found   Next office visit with prescribing clinician: 7/26/2023         Erika Thakur MA  07/25/23, 12:18 EDT

## 2023-07-25 NOTE — OUTREACH NOTE
Call Center TCM Note      Flowsheet Row Responses   [facility], [place] patient discharged from? [place]   Does the patient have one of the following disease processes/diagnoses(primary or secondary)? Other   TCM attempt successful? Yes   Call start time 1118   Call end time 1121   Discharge diagnosis Partial small bowel obstruction  (no surgical procedure this visit)   Person spoke with today (if not patient) and relationship Patient   Meds reviewed with patient/caregiver? Yes  [Only change in meds was new order for regular tylenol.  Patient reports that she was told to reintroduce the immodium and lomotil slowly.]   Does the patient have all medications ordered at discharge? Yes   Is the patient taking all medications as directed (includes completed medication regime)? Yes   Comments PCP TRINY Worley. Hospital follow up in place for tomorrow 7/26  1pm with PCP. Patient plans to keep appt.   Does the patient have an appointment with their PCP within 7-14 days of discharge? Yes   Has home health visited the patient within 72 hours of discharge? N/A   Psychosocial issues? No   Did the patient receive a copy of their discharge instructions? Yes   Nursing interventions Reviewed instructions with patient   What is the patient's perception of their health status since discharge? Same  [Patient reports that she ate last night without nausea or abdominal pain. Reports some abdominal cramping. Patient reports has not eaten yet today, no bowel movements since discharge]   Is the patient/caregiver able to teach back signs and symptoms related to disease process for when to call PCP? Yes   Is the patient/caregiver able to teach back signs and symptoms related to disease process for when to call 911? Yes   Is the patient/caregiver able to teach back the hierarchy of who to call/visit for symptoms/problems? PCP, Specialist, Home health nurse, Urgent Care, ED, 911 Yes   TCM call completed? Yes   Call end time  1121   Would this patient benefit from a Referral to [facility] Social Work? No   Is the patient interested in additional calls from an ambulatory ?  NOTE:  applies to high risk patients requiring additional follow-up. No            Sandrita Mireles RN    7/25/2023, 11:25 EDT

## 2023-07-26 ENCOUNTER — OFFICE VISIT (OUTPATIENT)
Dept: INTERNAL MEDICINE | Facility: CLINIC | Age: 43
End: 2023-07-26
Payer: COMMERCIAL

## 2023-07-26 VITALS
OXYGEN SATURATION: 100 % | WEIGHT: 194.6 LBS | HEART RATE: 93 BPM | DIASTOLIC BLOOD PRESSURE: 70 MMHG | SYSTOLIC BLOOD PRESSURE: 100 MMHG | HEIGHT: 66 IN | BODY MASS INDEX: 31.27 KG/M2

## 2023-07-26 DIAGNOSIS — F33.9 MONOPOLAR DEPRESSION: Chronic | ICD-10-CM

## 2023-07-26 DIAGNOSIS — Z79.01 CHRONIC ANTICOAGULATION: Chronic | ICD-10-CM

## 2023-07-26 DIAGNOSIS — K52.9 CHRONIC DIARRHEA: ICD-10-CM

## 2023-07-26 DIAGNOSIS — K56.600 PARTIAL SMALL BOWEL OBSTRUCTION: ICD-10-CM

## 2023-07-26 DIAGNOSIS — C20 ADENOCARCINOMA OF RECTUM: Primary | Chronic | ICD-10-CM

## 2023-07-26 DIAGNOSIS — D68.51 HETEROZYGOUS FACTOR V LEIDEN MUTATION: Chronic | ICD-10-CM

## 2023-08-02 PROBLEM — M79.89 BILATERAL HAND SWELLING: Status: RESOLVED | Noted: 2020-05-02 | Resolved: 2023-08-02

## 2023-08-02 PROBLEM — T45.1X5A CHEMOTHERAPY-INDUCED NAUSEA: Status: RESOLVED | Noted: 2020-02-01 | Resolved: 2023-08-02

## 2023-08-02 PROBLEM — M25.511 RIGHT SHOULDER PAIN: Status: RESOLVED | Noted: 2020-11-16 | Resolved: 2023-08-02

## 2023-08-02 PROBLEM — D75.89 MACROCYTOSIS: Status: RESOLVED | Noted: 2021-07-01 | Resolved: 2023-08-02

## 2023-08-02 PROBLEM — K52.9 CHRONIC DIARRHEA: Chronic | Status: ACTIVE | Noted: 2019-12-10

## 2023-08-02 PROBLEM — K52.1 CHEMOTHERAPY INDUCED DIARRHEA: Status: RESOLVED | Noted: 2021-01-01 | Resolved: 2023-08-02

## 2023-08-02 PROBLEM — R11.0 CHEMOTHERAPY-INDUCED NAUSEA: Status: RESOLVED | Noted: 2020-02-01 | Resolved: 2023-08-02

## 2023-08-02 PROBLEM — T45.1X5A CHEMOTHERAPY INDUCED DIARRHEA: Status: RESOLVED | Noted: 2021-01-01 | Resolved: 2023-08-02

## 2023-08-03 ENCOUNTER — READMISSION MANAGEMENT (OUTPATIENT)
Dept: CALL CENTER | Facility: HOSPITAL | Age: 43
End: 2023-08-03
Payer: COMMERCIAL

## 2023-08-07 ENCOUNTER — TELEPHONE (OUTPATIENT)
Dept: ONCOLOGY | Facility: CLINIC | Age: 43
End: 2023-08-07
Payer: COMMERCIAL

## 2023-08-07 ENCOUNTER — READMISSION MANAGEMENT (OUTPATIENT)
Dept: CALL CENTER | Facility: HOSPITAL | Age: 43
End: 2023-08-07
Payer: COMMERCIAL

## 2023-08-07 NOTE — TELEPHONE ENCOUNTER
----- Message from Dong Nguyen MD PhD sent at 8/1/2023  6:47 PM EDT -----  Regarding: Appointments  Sandrita and Anais,    Patient will come back to see me in 3 months, 2 weeks after the CT scan for chest abdomen pelvis with IV and oral contrast, and also laboratory study with CBC, CMP, CEA, and also obtain guardant reveal study.    Her most recent guardant REVEAL was negative.  I called and left a message for her.    Thank you!    Patrick

## 2023-08-07 NOTE — OUTREACH NOTE
Medical Week 2 Survey      Flowsheet Row Responses   [facility] patient discharged from? [place]   Does the patient have one of the following disease processes/diagnoses(primary or secondary)? Other   Week 2 attempt successful? No   Unsuccessful attempts Attempt 2            Audrey CORREA - Registered Nurse

## 2023-08-11 ENCOUNTER — OFFICE VISIT (OUTPATIENT)
Dept: SURGERY | Facility: CLINIC | Age: 43
End: 2023-08-11
Payer: COMMERCIAL

## 2023-08-11 ENCOUNTER — PREP FOR SURGERY (OUTPATIENT)
Dept: OTHER | Facility: HOSPITAL | Age: 43
End: 2023-08-11
Payer: COMMERCIAL

## 2023-08-11 VITALS
HEART RATE: 78 BPM | DIASTOLIC BLOOD PRESSURE: 72 MMHG | SYSTOLIC BLOOD PRESSURE: 110 MMHG | BODY MASS INDEX: 31.72 KG/M2 | OXYGEN SATURATION: 96 % | WEIGHT: 196.5 LBS

## 2023-08-11 DIAGNOSIS — R19.8 LOW ANTERIOR RESECTION SYNDROME: ICD-10-CM

## 2023-08-11 DIAGNOSIS — Z85.048 HISTORY OF RECTAL CANCER: Primary | ICD-10-CM

## 2023-08-11 NOTE — PROGRESS NOTES
Carole Weaver is a 43 y.o. female in for follow up of History of rectal cancer    Low anterior resection syndrome    The patient presents today for a follow-up.    The patient has a history of rectal cancer. She reports she is doing well. She states she has about 2 or 3 bowel movements after eating but less often. The patient reports she used to take it pretty regimented even if she was not eating for the day. She states she takes fiber and Imodium as needed. The patient reports in between that she only takes it about an hour before meals, which is usually just once a day. She states she has gained 5 pounds. She has incorporated walking because she wants to keep it moving. She states she is actively in therapy for pelvic floor therapy. She is doing electrical stimulation with muscle improvement. She notes improvement with sleep due to not having bowel movements during the night.     /72 (BP Location: Left arm, Patient Position: Sitting, Cuff Size: Large Adult)   Pulse 78   Wt 89.1 kg (196 lb 8 oz)   SpO2 96%   Breastfeeding No   BMI 31.72 kg/mý   Body mass index is 31.72 kg/mý.      PE:  Physical Exam  Constitutional:       General: She is not in acute distress.     Appearance: She is well-developed.   HENT:      Head: Normocephalic and atraumatic.   Abdominal:      General: There is no distension.      Palpations: Abdomen is soft.   Musculoskeletal:         General: Normal range of motion.   Neurological:      Mental Status: She is alert.   Psychiatric:         Thought Content: Thought content normal.         Assessment:   1. History of rectal cancer    2. Low anterior resection syndrome       Follow-up after hospitalization for partial SBO versus ileus from Imodium  Plan:  The patient is doing well. She will continue with her current medication regimen.        Transcribed from ambient dictation for Padmaja Ye MD by Sheba Valerio.  08/11/23   12:20 EDT    Patient or patient representative  verbalized consent to the visit recording.  I have personally performed the services described in this document as transcribed by the above individual, and it is both accurate and complete.

## 2023-08-16 ENCOUNTER — TELEPHONE (OUTPATIENT)
Dept: INTERNAL MEDICINE | Facility: CLINIC | Age: 43
End: 2023-08-16
Payer: COMMERCIAL

## 2023-08-16 NOTE — TELEPHONE ENCOUNTER
Left Voice Mail for pt for an over due dexa in chart. Wondering if pt is still wanting to get this done. [phone] to schedule dexa scan.

## 2023-08-21 ENCOUNTER — TELEPHONE (OUTPATIENT)
Dept: ONCOLOGY | Facility: CLINIC | Age: 43
End: 2023-08-21
Payer: COMMERCIAL

## 2023-08-21 NOTE — TELEPHONE ENCOUNTER
Caller: Carole Weaver    Relationship: Self    Best call back number: [phone]    What is the best time to reach you: ANY    Who are you requesting to speak with (clinical staff, provider,  specific staff member): CLINICAL     What was the call regarding: CAROLE IS CALLING STATES SHE HAS A  SCAN AND APPTS WITH DR HUNT IN SEPTEMBER, SHE STATES IT IS SUPPOSE TO BE IN OCTOBER BECAUSE THAT WOULD BE 3 MONTHS.  SHE IS ASKING FOR HER APPOINTMENTS TO BE RESCHEDULED TO OCTOBER     ALSO SHE NEEDS TO DISCUSS RUNNING LABS THROUGH HER PORT AT THE OFFICE. BECAUSE LABS CAN NOT BE ACCESSED THROUGH  THE PORT, AND WANTS IT DONE AT THE HOSPITAL     CAROLE IS ALSO NEEDING A FLUSH APPOINTMENT AT THE END OF AUGUST     PLEASE ADVISE

## 2023-08-21 NOTE — TELEPHONE ENCOUNTER
Called the patient back and let her know Dr. Nguyen wanted her scans pushed back a month. Patient v/u.

## 2023-09-01 ENCOUNTER — TELEPHONE (OUTPATIENT)
Dept: ONCOLOGY | Facility: CLINIC | Age: 43
End: 2023-09-01

## 2023-09-01 ENCOUNTER — INFUSION (OUTPATIENT)
Dept: ONCOLOGY | Facility: HOSPITAL | Age: 43
End: 2023-09-01
Payer: COMMERCIAL

## 2023-09-01 DIAGNOSIS — Z45.2 ENCOUNTER FOR FITTING AND ADJUSTMENT OF VASCULAR CATHETER: Primary | ICD-10-CM

## 2023-09-01 RX ORDER — SODIUM CHLORIDE 0.9 % (FLUSH) 0.9 %
10 SYRINGE (ML) INJECTION AS NEEDED
Status: DISCONTINUED | OUTPATIENT
Start: 2023-09-01 | End: 2023-09-01 | Stop reason: HOSPADM

## 2023-09-01 RX ORDER — HEPARIN SODIUM (PORCINE) LOCK FLUSH IV SOLN 100 UNIT/ML 100 UNIT/ML
500 SOLUTION INTRAVENOUS AS NEEDED
Status: DISCONTINUED | OUTPATIENT
Start: 2023-09-01 | End: 2023-09-01 | Stop reason: HOSPADM

## 2023-09-01 RX ORDER — SODIUM CHLORIDE 0.9 % (FLUSH) 0.9 %
10 SYRINGE (ML) INJECTION AS NEEDED
OUTPATIENT
Start: 2023-09-01

## 2023-09-01 RX ORDER — HEPARIN SODIUM (PORCINE) LOCK FLUSH IV SOLN 100 UNIT/ML 100 UNIT/ML
500 SOLUTION INTRAVENOUS AS NEEDED
OUTPATIENT
Start: 2023-09-01

## 2023-09-01 NOTE — CASE COMMUNICATION
----- Message from Jaswinder Peterson MD sent at 8/30/2023  2:55 PM CDT -----  Please call Elpidio regarding the result of her MRI lumbar spine. It shows arthritis, disc bulges and her prior surgery site. There is multilevel nerve pinching. I recommend starting with a caudal epidural injection (no steroid). If interested, please schedule. Also, did she connect with [employer] for programming?    Dear Dr. Padmaja Ye    Re:Carole Owen  :1980    The LPN home visit  on 2022 for the above patient was missed due to patient being scheduled twice and was seen by nurse Brigitte today.     If you have questions or would like further information about this patient, please contact us at [phone].    Regards,  Carine Goetz LPN

## 2023-09-01 NOTE — TELEPHONE ENCOUNTER
Caller: Carole Weaver    Relationship to patient: Self    Best call back number: [phone]    Chief complaint: SICK     Type of visit: PORT FLUSH     Requested date: PATIENT WILL CALL BACK TO R/S     If rescheduling, when is the original appointment: 9/1/2023

## 2023-09-11 DIAGNOSIS — F41.9 ANXIETY: ICD-10-CM

## 2023-09-11 DIAGNOSIS — Z85.048 HISTORY OF RECTAL CANCER: ICD-10-CM

## 2023-09-11 DIAGNOSIS — H00.019 HORDEOLUM, UNSPECIFIED HORDEOLUM TYPE, UNSPECIFIED LATERALITY: ICD-10-CM

## 2023-09-12 RX ORDER — DIPHENOXYLATE HYDROCHLORIDE AND ATROPINE SULFATE 2.5; .025 MG/1; MG/1
TABLET ORAL
Qty: 240 TABLET | Refills: 2 | Status: SHIPPED | OUTPATIENT
Start: 2023-09-12

## 2023-09-12 NOTE — TELEPHONE ENCOUNTER
Patient:   RIZWANA FUENTES            MRN: [id number]            FIN: [id number]               Age:   9 days     Sex:  MALE     :  17   Associated Diagnoses:   Premature infant of 34 weeks gestation; Single liveborn, born in hospital, delivered by  section;  affected by placenta previa; Physiologic jaundice of ; Observation and evaluation of  for suspected infectious condition ruled out; Bradycardia in    Author:   ABELARDO GAN      Basic Information   Patient Information:       Admit to Hospital:    2018  Admit to Critical Care:    2018  Current Date:    2018  Days in Critical Care:    10  , Days of Life      :  2018 14:54  AGE:  10 Days  Duration of Current Hospitalization:  10 Days   Gestational Age at Birth:  34 5/7   Corrected Gestational Age:  36 1/7   ,    Allergies (1) Active Reaction  NKA None Documented  .       Discharge Plan      Summary Information   Disposition     2018.     Patient Discharge.     Admitting Physician     RICHARD BASS.     Medications     Documented Medications (ST)   Home Medications (1) Active  Poly Vit Drops (Poly-Vi-Sol) oral liquid 1 mL, Oral, Daily  .     Nutrition     Formula:     Similac 19kcal.     Breastfeeding.     Volume/ interval: ad jonny on demand.     Discharge Measurements     Growth Chart Percentile : Advanced Growth Chart   18 12:00 Head Circumference CM 33.5 cm    CLINICALHEIGHT 48.5 cm    CLINICALWEIGHT 2.465 kg    BMI-Medical History 10.5 kg/m2   .     Instructions     Verbal instructions.     Written instructions.     Given to: mother.     Regarding: follow up, diet, medications.     Summary of Events   Rizwana was born at 34 5/7 to a  mother via repeat  for suspected placenta accreta/increta, concern for 2 vessel cord. Mother was given BMTZ at 33 weeks. Prenatal labs: Blood type (O, Rh positive), Rapid plasma reagin nonreactive,  Rx refill request.   Hepatitis B negative, Human immunodeficiency virus negative, Group B Strep unknown, Rubella immune    The baby was delivered breech, via  on . The NICU was called for prematurity and placenta accreta. Apgars were 9/10 at 1 minute and 9/10 at 5 minutes. Received routine resuscitation. The baby was brought to the NICU due to prematurity stable on room air.    Respiratory: He remained on room air throughout hospital course. He had multiple bradycardic and desaturation spells on his first three days of life, ocassionally associated with feeds. His last clinically significant event was on . He was monitored for additional 5 days and spell free prior to discharge.    FEN/GI: Andi took breast milk/similac 19kcal PO ad jonny throughout his admission. Blood glucose monitored and briefly required IV fluids. At discharge, baby tolerating BM/Sim PO ad jonny feeds, poly vi sol 1ml daily. Discharge weight 2465g (down -4% from birth weight).    Endocrine: Initially with low blood glucose and started on feeds and IV fluids. Fluids were weaned and blood glucose remained stable. Resolved prior to discharge.     Heme: Under phototherapy for one day due to hyperbilirubinemia to 16.2. Repeat bili trended and did not require further intervention.     Tox: Due to possible placental abruption, urine tox and meconium tox sent and both negative.     [x] Hep B vaccine received   [x] Hearing passed  [x] NBS x 2 (, )  [x] CCHD passed  [x] Circ -   [x] Car seat screen passed  [x] PMD - 10/1 appointment           .        Counseled:   Counseling provided to:  Family.    Regarding:  Diagnosis, Medications.       Patient Education:    Baby Care, Keeping Your  Safe and Healthy, Baby Safe Sleeping Information, Easy-to-Read.     Follow-up:   NATHAN VIRARAOUL 10/03/18 11:30:00.     Plan to Discharge:   Discharge to home.  2018            __________________________________________________   ELVIA  ABELARDO      Electronically Signed On 2018 16:38  __________________________________________________   ELVIA, ABELARDO      Electronically Signed On 2018 15:01  __________________________________________________   MADERA-MD, DEEPTI      __________________________________________________   Ly Tapia

## 2023-09-12 NOTE — TELEPHONE ENCOUNTER
Rx Refill Note  Requested Prescriptions     Pending Prescriptions Disp Refills    clonazePAM (KlonoPIN) 1 MG tablet [Pharmacy Med Name: CLONAZEPAM 1 MG TABLET] 90 tablet 0     Sig: TAKE 1 TABLET BY MOUTH THREE TIMES A DAY AS NEEDED FOR ANXIETY      Last office visit with prescribing clinician: 7/26/2023   Last telemedicine visit with prescribing clinician: Visit date not found   Next office visit with prescribing clinician: 9/13/2023         Erika Thakur MA  09/12/23, 07:59 EDT

## 2023-09-13 ENCOUNTER — OFFICE VISIT (OUTPATIENT)
Dept: INTERNAL MEDICINE | Facility: CLINIC | Age: 43
End: 2023-09-13
Payer: COMMERCIAL

## 2023-09-13 ENCOUNTER — TELEPHONE (OUTPATIENT)
Dept: INTERNAL MEDICINE | Facility: CLINIC | Age: 43
End: 2023-09-13

## 2023-09-13 VITALS
SYSTOLIC BLOOD PRESSURE: 120 MMHG | DIASTOLIC BLOOD PRESSURE: 70 MMHG | BODY MASS INDEX: 31.26 KG/M2 | HEART RATE: 79 BPM | OXYGEN SATURATION: 98 % | HEIGHT: 66 IN | WEIGHT: 194.5 LBS

## 2023-09-13 DIAGNOSIS — Z92.21 HISTORY OF CHEMOTHERAPY: ICD-10-CM

## 2023-09-13 DIAGNOSIS — C78.01 MALIGNANT NEOPLASM METASTATIC TO BOTH LUNGS: ICD-10-CM

## 2023-09-13 DIAGNOSIS — I10 PRIMARY HYPERTENSION: Chronic | ICD-10-CM

## 2023-09-13 DIAGNOSIS — C78.02 MALIGNANT NEOPLASM METASTATIC TO BOTH LUNGS: ICD-10-CM

## 2023-09-13 DIAGNOSIS — F41.9 ANXIETY: Chronic | ICD-10-CM

## 2023-09-13 DIAGNOSIS — F33.9 MONOPOLAR DEPRESSION: Chronic | ICD-10-CM

## 2023-09-13 DIAGNOSIS — Z79.01 CHRONIC ANTICOAGULATION: Chronic | ICD-10-CM

## 2023-09-13 DIAGNOSIS — K52.9 CHRONIC DIARRHEA: Chronic | ICD-10-CM

## 2023-09-13 DIAGNOSIS — Z85.048 HISTORY OF RECTAL CANCER: ICD-10-CM

## 2023-09-13 DIAGNOSIS — C20 ADENOCARCINOMA OF RECTUM: Chronic | ICD-10-CM

## 2023-09-13 DIAGNOSIS — Z00.00 ANNUAL PHYSICAL EXAM: Primary | ICD-10-CM

## 2023-09-13 DIAGNOSIS — T45.4X5A ADVERSE EFFECT OF IRON, INITIAL ENCOUNTER: ICD-10-CM

## 2023-09-13 DIAGNOSIS — D68.51 HETEROZYGOUS FACTOR V LEIDEN MUTATION: Chronic | ICD-10-CM

## 2023-09-13 DIAGNOSIS — K58.8 OTHER IRRITABLE BOWEL SYNDROME: Chronic | ICD-10-CM

## 2023-09-13 DIAGNOSIS — H00.019 HORDEOLUM, UNSPECIFIED HORDEOLUM TYPE, UNSPECIFIED LATERALITY: ICD-10-CM

## 2023-09-13 RX ORDER — SULFACETAMIDE SODIUM 100 MG/G
OINTMENT OPHTHALMIC EVERY 6 HOURS
Qty: 3.5 G | Refills: 0 | Status: SHIPPED | OUTPATIENT
Start: 2023-09-13

## 2023-09-13 RX ORDER — SULFACETAMIDE SODIUM 100 MG/G
OINTMENT OPHTHALMIC EVERY 6 HOURS
Qty: 3.5 G | Refills: 0 | Status: CANCELLED | OUTPATIENT
Start: 2023-09-13

## 2023-09-13 RX ORDER — SULFACETAMIDE SODIUM 100 MG/G
OINTMENT OPHTHALMIC EVERY 6 HOURS
Qty: 3.5 G | Refills: 0 | Status: SHIPPED | OUTPATIENT
Start: 2023-09-13 | End: 2023-09-13 | Stop reason: SDUPTHER

## 2023-09-13 NOTE — PATIENT INSTRUCTIONS
September is Prostate Cancer Awareness Month   Prostate cancer is the second most common cancer among men.   Screening generally starts at 55 years of age but if you have increased risk factors:   Have at least one first-degree relative (such as your father or brother) who has had prostate cancer  Have at least two extended family members who have had prostate cancer   Are -American, an ethnicity that has a higher risk of developing more aggressive cancers  Your screening could start at a younger age. Please ensure you discuss your risk factors.     https://www.cdc.gov/cancer/prostate/basic_info/index.htm          October is Breast Cancer Awareness Month  Each year more than 245,000 women get breast cancer in the [place].  Each year approximately 2,650 men are diagnosed with breast cancer.     Monitor your breast for changes  Any change in the size or the shape of the breast  Pain in any area of the breast  Nipple discharge other than breast milk (including blood)  A new lump in the breast or underarm  *Continue regular scheduled mammograms    Diet:    Eat vegetables, fruits, whole grain, low-fat dairy, poultry, fish, beans, nontropical vegetable oils, and nuts, but low amounts of red meat (i.e., Mediterranean-style diet, DASH [Dietary Approaches to Stop Hypertension] diet).  Limit sugary drinks and sweets.  Limit saturated and trans fat to 5% to 6% of calories.  Limit sodium intake to 2,400 mg daily (about one teaspoon table salt [kosher/sea salt have less sodium per teaspoon]).  Fad diets will come and go.  Studies show that the most effective diet is one that you can continue long term.     Weight loss / Calorie Counting Apps:    Lose It!   [employer] Pal   Works great when you try it with a partner/ friend    Exercise:   Engage in moderate-to-vigorous aerobic activity for at least 40 minutes (on average) three to four times each week.    Wearables:   Activity tracker   Step tracker: getting 7,500  steps daily can cut your cardiac risks by 44%     Bone Health:   Https://www.nof.org/patients/treatment/nutrition/  Routine weight bearing exercise    Vaccines:   Flu vaccine every fall  https://www.vaccinateyourfamily.org/        COVID booster recommended: newest booster should be out at the end of September  COVID resources: https://VL[employer] Partnersstatus.CompassMD/kylcasf35    Note: you may take COVID booster along with flu vaccine unless contraindicated.

## 2023-09-13 NOTE — TELEPHONE ENCOUNTER
Left Voice Mail for pt regarding message below. HUB TO READ PLEASE TRANSFER PT TO ME PLEASE.       ----- Message from Carole Weaver sent at 9/13/2023 11:59 AM EDT -----  Regarding: Medications  Contact: [phone]  Ricky Bhatt/Erika-  I just left there this morning and after my appointment I waited about an hour and went to the pharmacy to  my meds from Monday and the new eye cream from today. They only had my Lomotil and Lovenox ready. They said they had not received the approval for the klonopin from Monday and that they did not have the new eye cream. Additionally, he told me that I may want to have you all send it to a different [facility] because they are running 10 days and 2,000 prescriptions behind. So, can you please send those two scripts to the [facility] at [address]? Their phone number is [phone]. Please let me know if you got this message and if that is Ok. I tried to call but couldn’t reach anyone. Thanks!    Carole Weaver  
(3) walks occasionally

## 2023-09-13 NOTE — PROGRESS NOTES
Chief Complaint  Annual Exam, Stye, and Cyst (Right side of Cheek  )     Subjective:      History of Present Illness {CC  Problem List  Visit  Diagnosis   Encounters  Notes  Medications  Labs  Result Review Imaging  Media :23}     Carole Weaver presents to [facility] for:  annual exam.     She has multiple chronic conditions:   Rectal cancer 7/2019 pT3N1 rectal cancer stage IIIb is S/P laparoscopic LAR with diverting loop ileostomy on 12/02/2019 and S/P Ileostomy takedown 11/14/2022.  , peripheral neuropathy, chronic anticoagulation (hetero Factor V Leiden), hypertension, anxiety, depression, current smoker, coronary artery calcification mid LAD, Sinus tachycardia, vitamin d deficiency     Not currently working.     Today: states she has recurrent stye on bl lower lids. She has tried hot compresses. No change in vision or drainage.     Carole is here for coordination of medical care, to discuss health maintenance, disease prevention as well as to followup on medical problems.     Patient Care Team:  Deepika Vela III, NP-C as PCP - General (Family Medicine)  Padmaja Ye MD as Referring Physician (Colon and Rectal Surgery)  Beatrice Lau MD as Consulting Physician (Radiation Oncology)  Dong Nguyen MD PhD as Consulting Physician (Hematology and Oncology)  Wendy Cottrell MD as Consulting Physician (Obstetrics and Gynecology)  Tasha Bustos MD as Consulting Physician (Cardiology)  Charissa Messina PA-C as Physician Assistant (Colon and Rectal Surgery)  Lurdes Tucker PA-C as Physician Assistant (Physician Assistant)     Activity level is moderate.   Walking with mother.     Weight trend is     Wt Readings from Last 4 Encounters:   09/13/23 88.2 kg (194 lb 8 oz)   08/11/23 89.1 kg (196 lb 8 oz)   07/26/23 88.3 kg (194 lb 9.6 oz)   07/22/23 88 kg (193 lb 14.4 oz)         Health Maintenance Female:    GYN: Ronit Jackson gynecology  appointment:   Mammogram: UTD  Advised routine self-breast exams monthly.    Colon cancer screen:   UTD: Dr Ye     Vaccines: Advised flu and updated COVID      Last eye exam: advise routinely and for changes.     Advised regular sunscreen.        Her cardiovascular risks are:     [] No Known risk factors    [x] Hypertension   [] Hyperlipidemia  [] Diabetes    [] Obesity  [x] Family history   [x] Current or hx tobacco use  [x] History of chemo   [] Post-menopausal        I have reviewed patient's medical history, any new submitted information provided by patient or medical assistant and updated medical record.      Objective:      Physical Exam  Vitals reviewed.   Constitutional:       Appearance: Normal appearance. She is well-developed.   Neck:      Thyroid: No thyromegaly.   Cardiovascular:      Rate and Rhythm: Normal rate and regular rhythm.      Pulses: Normal pulses.      Heart sounds: Normal heart sounds.   Pulmonary:      Effort: Pulmonary effort is normal.      Breath sounds: Normal breath sounds.      Comments: E/U   Abdominal:      General: Bowel sounds are normal.      Palpations: Abdomen is soft.       Musculoskeletal:         General: Normal range of motion.      Cervical back: Normal range of motion and neck supple.      Right lower leg: No edema.      Left lower leg: No edema.   Lymphadenopathy:      Cervical: No cervical adenopathy.   Skin:     General: Skin is warm and dry.      Capillary Refill: Capillary refill takes 2 to 3 seconds.   Neurological:      Mental Status: She is alert and oriented to person, place, and time.   Psychiatric:         Mood and Affect: Mood normal.         Behavior: Behavior normal. Behavior is cooperative.         Thought Content: Thought content normal.         Judgment: Judgment normal.      Result Review  Data Reviewed:{ Labs  Result Review  Imaging  Med Tab  Media :23}     The following data was reviewed by: Deepika Vela III, NP-C on  "09/13/2023  Common labs          7/22/2023    14:53 7/23/2023    04:42 7/24/2023    04:50 7/24/2023    14:22   Common Labs   Glucose 128  101  79     BUN 7  8  9     Creatinine 0.76  0.63  0.60     Sodium 140  140  139     Potassium 4.0  3.9  3.6  3.4    Chloride 104  107  107     Calcium 10.1  8.7  8.5     Albumin 4.3   3.1     Total Bilirubin 0.5       Alkaline Phosphatase 108       AST (SGOT) 18       ALT (SGPT) 25       WBC 7.26  7.33      Hemoglobin 17.4  14.0      Hematocrit 50.8  40.7      Platelets 261  218               Vital Signs:   /70 (BP Location: Left arm, Patient Position: Sitting, Cuff Size: Adult)   Pulse 79   Ht 167.6 cm (66\")   Wt 88.2 kg (194 lb 8 oz)   SpO2 98%   BMI 31.39 kg/m²         Requested Prescriptions     Signed Prescriptions Disp Refills    sulfacetamide (BLEPH-10) 10 % ophthalmic ointment 3.5 g 0     Sig: Administer  to both eyes Every 6 (Six) Hours.       Routine medications provided by this office will also be refilled via pharmacy request.       Current Outpatient Medications:     clonazePAM (KlonoPIN) 1 MG tablet, TAKE 1 TABLET BY MOUTH THREE TIMES A DAY AS NEEDED FOR ANXIETY, Disp: 90 tablet, Rfl: 0    Cyanocobalamin (VITAMIN B-12 PO), Take 1 tablet by mouth Every Other Day., Disp: , Rfl:     dicyclomine (BENTYL) 20 MG tablet, Take 1 tablet by mouth Every 6 (Six) Hours As Needed (for irritable bowel symptoms). Indications: Irritable Bowel Syndrome, Disp: 360 tablet, Rfl: 2    diphenoxylate-atropine (LOMOTIL) 2.5-0.025 MG per tablet, TAKE 2 TABLETS BY MOUTH 4 TIMES A DAY, Disp: 240 tablet, Rfl: 2    Enoxaparin Sodium (LOVENOX) 100 MG/ML solution prefilled syringe syringe, INJECT 1 ML UNDER THE SKIN INTO THE APPROPRIATE AREA AS DIRECTED EVERY 12 (TWELVE) HOURS., Disp: 60 mL, Rfl: 2    escitalopram (LEXAPRO) 10 MG tablet, TAKE 1 TABLET BY MOUTH EVERY DAY, Disp: 90 tablet, Rfl: 1    famotidine (PEPCID) 20 MG tablet, TAKE 1 TABLET BY MOUTH TWICE A DAY, Disp: 180 tablet, " Rfl: 1    Loperamide HCl (IMODIUM PO), Take  by mouth., Disp: , Rfl:     Loratadine 10 MG capsule, Take 1 capsule by mouth Every Evening., Disp: , Rfl:     metoprolol succinate XL (TOPROL-XL) 25 MG 24 hr tablet, TAKE 0.5 TABLETS BY MOUTH EVERY NIGHT, Disp: 45 tablet, Rfl: 3    ondansetron (ZOFRAN) 8 MG tablet, TAKE 1 TABLET BY MOUTH THREE TIMES A DAY AS NEEDED FOR NAUSEA AND VOMITING, Disp: 60 tablet, Rfl: 1    promethazine (PHENERGAN) 25 MG tablet, Take 1 tablet by mouth Every 6 (Six) Hours As Needed for Nausea or Vomiting., Disp: 60 tablet, Rfl: 2    sulfacetamide (BLEPH-10) 10 % ophthalmic ointment, Administer  to both eyes Every 6 (Six) Hours., Disp: 3.5 g, Rfl: 0    vitamin D (ERGOCALCIFEROL) 1.25 MG (21337 UT) capsule capsule, Take 1 capsule by mouth 1 (One) Time Per Week., Disp: , Rfl:      Assessment and Plan:      Assessment and Plan {CC Problem List  Visit Diagnosis  ROS  Review (Popup)  [facility] Maintenance  Quality  BestPractice  Medications  SmartSets  SnapShot Encounters  Media :23}     Problem List Items Addressed This Visit          Allergies and Adverse Reactions    Adverse effect of iron       Cardiac and Vasculature    HTN (hypertension) (Chronic)    Overview     Lisinopril 10 mg too much. (weakness, dizziness)     Continue to monitor - if need to restart, advised lower dose.           Current Assessment & Plan     Hypertension is improving with treatment.  Continue current treatment regimen.  Blood pressure will be reassessed at the next regular appointment.  Advise smoking cessation.  Continues Toprol for tachycardia 2/2 chemo.             Coag and Thromboembolic    Chronic anticoagulation (Chronic)    Heterozygous factor V Leiden mutation (Chronic)       Gastrointestinal Abdominal     Irritable bowel syndrome (Chronic)    Chronic diarrhea (Chronic)       Hematology and Neoplasia    Adenocarcinoma of rectum (Chronic)    Secondary cancer of lung    History of rectal cancer    Overview      Added automatically from request for surgery [id number]         History of chemotherapy    Overview     FOLLOWED BY DR. ALEXANDRU HUNT            Mental Health    Anxiety (Chronic)    Monopolar depression (Chronic)    Overview     lexapro 20 mg: decreased libido   Prior treatment:  Wellbutrin (believes it had effect on libido)            Other Visit Diagnoses       Annual physical exam    -  Primary    Hordeolum, unspecified hordeolum type, unspecified laterality        Relevant Medications    sulfacetamide (BLEPH-10) 10 % ophthalmic ointment            Follow Up {Instructions Charge Capture  Follow-up Communications :23}     Return in about 3 months (around 12/13/2023).      Patient was given instructions and counseling regarding her condition or for health maintenance advice. Please see specific information pulled into the AVS if appropriate.    Dragon disclaimer:   Much of this encounter note is an electronic transcription/translation of spoken language to printed text. The electronic translation of spoken language may permit erroneous, or at times, nonsensical words or phrases to be inadvertently transcribed; Although I have reviewed the note for such errors, some may still exist.     Additional Patient Counseling:       Patient Instructions       September is Prostate Cancer Awareness Month   Prostate cancer is the second most common cancer among men.   Screening generally starts at 55 years of age but if you have increased risk factors:   Have at least one first-degree relative (such as your father or brother) who has had prostate cancer  Have at least two extended family members who have had prostate cancer   Are -American, an ethnicity that has a higher risk of developing more aggressive cancers  Your screening could start at a younger age. Please ensure you discuss your risk factors.     https://www.cdc.gov/cancer/prostate/basic_info/index.htm          October is Breast Cancer Awareness  Month  Each year more than 245,000 women get breast cancer in the [place].  Each year approximately 2,650 men are diagnosed with breast cancer.     Monitor your breast for changes  Any change in the size or the shape of the breast  Pain in any area of the breast  Nipple discharge other than breast milk (including blood)  A new lump in the breast or underarm  *Continue regular scheduled mammograms    Diet:    Eat vegetables, fruits, whole grain, low-fat dairy, poultry, fish, beans, nontropical vegetable oils, and nuts, but low amounts of red meat (i.e., Mediterranean-style diet, DASH [Dietary Approaches to Stop Hypertension] diet).  Limit sugary drinks and sweets.  Limit saturated and trans fat to 5% to 6% of calories.  Limit sodium intake to 2,400 mg daily (about one teaspoon table salt [kosher/sea salt have less sodium per teaspoon]).  Fad diets will come and go.  Studies show that the most effective diet is one that you can continue long term.     Weight loss / Calorie Counting Apps:    Lose It!   [employer] Pal   Works great when you try it with a partner/ friend    Exercise:   Engage in moderate-to-vigorous aerobic activity for at least 40 minutes (on average) three to four times each week.    Wearables:   Activity tracker   Step tracker: getting 7,500 steps daily can cut your cardiac risks by 44%     Bone Health:   Https://www.nof.org/patients/treatment/nutrition/  Routine weight bearing exercise    Vaccines:   Flu vaccine every fall  https://www.vaccinateyourfamily.org/        COVID booster recommended: newest booster should be out at the end of September  COVID resources: https://govstatus.Enervee/lpizqiz95    Note: you may take COVID booster along with flu vaccine unless contraindicated.

## 2023-09-14 PROBLEM — K65.1 ABDOMINOPELVIC ABSCESS: Status: RESOLVED | Noted: 2020-08-12 | Resolved: 2023-09-14

## 2023-09-14 PROBLEM — Z93.2 ILEOSTOMY PRESENT: Chronic | Status: RESOLVED | Noted: 2020-04-25 | Resolved: 2023-09-14

## 2023-09-14 RX ORDER — CLONAZEPAM 1 MG/1
TABLET ORAL
Qty: 90 TABLET | Refills: 0 | OUTPATIENT
Start: 2023-09-14

## 2023-09-14 NOTE — TELEPHONE ENCOUNTER
Klonopin  Pt states found July refill.    Stated could not find Mila refill that Levi and pharmacy documented that she had picked up. She will call pharmacy to discuss.

## 2023-09-14 NOTE — ASSESSMENT & PLAN NOTE
Hypertension is improving with treatment.  Continue current treatment regimen.  Blood pressure will be reassessed at the next regular appointment.  Advise smoking cessation.  Continues Toprol for tachycardia 2/2 chemo.

## 2023-09-26 NOTE — PLAN OF CARE
Problem: Patient Care Overview  Goal: Plan of Care Review  Outcome: Ongoing (interventions implemented as appropriate)    Ambulating in halls. Pain controlled with scheduled meds. F/c removed- pt voiding. Potty hat placed this evening to measure urine- pt states she has voided a lot today. + ostomy function. VSS, no complaints. Will continue to monitor.    no lesions, no deformities, no traumatic injuries, no significant scars are present, chest wall non-tender, no masses present, breathing is unlabored without accessory muscle use,normal breath sounds

## 2023-09-28 ENCOUNTER — INFUSION (OUTPATIENT)
Dept: ONCOLOGY | Facility: HOSPITAL | Age: 43
End: 2023-09-28
Payer: COMMERCIAL

## 2023-09-28 DIAGNOSIS — Z45.2 ENCOUNTER FOR FITTING AND ADJUSTMENT OF VASCULAR CATHETER: Primary | ICD-10-CM

## 2023-09-28 PROCEDURE — 25010000002 HEPARIN LOCK FLUSH PER 10 UNITS: Performed by: INTERNAL MEDICINE

## 2023-09-28 PROCEDURE — 96523 IRRIG DRUG DELIVERY DEVICE: CPT

## 2023-09-28 RX ORDER — HEPARIN SODIUM (PORCINE) LOCK FLUSH IV SOLN 100 UNIT/ML 100 UNIT/ML
500 SOLUTION INTRAVENOUS AS NEEDED
OUTPATIENT
Start: 2023-09-28

## 2023-09-28 RX ORDER — HEPARIN SODIUM (PORCINE) LOCK FLUSH IV SOLN 100 UNIT/ML 100 UNIT/ML
500 SOLUTION INTRAVENOUS AS NEEDED
Status: DISCONTINUED | OUTPATIENT
Start: 2023-09-28 | End: 2023-09-28 | Stop reason: HOSPADM

## 2023-09-28 RX ORDER — SODIUM CHLORIDE 0.9 % (FLUSH) 0.9 %
10 SYRINGE (ML) INJECTION AS NEEDED
Status: DISCONTINUED | OUTPATIENT
Start: 2023-09-28 | End: 2023-09-28 | Stop reason: HOSPADM

## 2023-09-28 RX ORDER — SODIUM CHLORIDE 0.9 % (FLUSH) 0.9 %
10 SYRINGE (ML) INJECTION AS NEEDED
OUTPATIENT
Start: 2023-09-28

## 2023-09-28 RX ADMIN — Medication 500 UNITS: at 10:45

## 2023-09-28 RX ADMIN — Medication 10 ML: at 10:45

## 2023-10-10 NOTE — PROGRESS NOTES
Progress Note    POD4 s/p laparoscopic low anterior colon resection with mobilization of splenic flexure and diverting loop ileostomy    S: positive flatus,  positive BM. positive ambulating. Pain managed with ERAS protocol scheduled Tylenol, Celebrex, and gabapentin, IV robaxin, as well as IV dilaudid for breakthrough pain    She is tolerating a soft diet well with no nausea or vomiting     No CP or SOB    O:  Temp:  [96.8 °F (36 °C)-98.9 °F (37.2 °C)] 98 °F (36.7 °C)  Heart Rate:  [79-93] 79  Resp:  [16-18] 16  BP: (129-142)/(70-87) 134/83      Intake/Output Summary (Last 24 hours) at 12/6/2019 0804  Last data filed at 12/6/2019 0555  Gross per 24 hour   Intake 1240 ml   Output 3525 ml   Net -2285 ml   ileo: 1550cc dark brown liquid output  UOP: 1790cc  MAGAN: 185cc sanguinous      Abd: soft, appropriately tender, non-distended  Incision: clean, dry, intact  Stoma: pink      Results from last 7 days   Lab Units 12/06/19  0313 12/05/19  0511 12/04/19  0747 12/04/19  0600   WBC 10*3/mm3 7.60 7.97  --  9.20   HEMOGLOBIN g/dL 9.2* 9.4* 7.5* 7.7*   HEMATOCRIT % 27.7* 28.6* 22.8* 22.7*   PLATELETS 10*3/mm3 228 207  --  216     Results from last 7 days   Lab Units 12/06/19  0313 12/05/19  0511 12/04/19  0600   SODIUM mmol/L 142 142 140   POTASSIUM mmol/L 3.9 4.0 4.1   CHLORIDE mmol/L 110* 109* 106   CO2 mmol/L 21.7* 25.0 24.3   BUN mg/dL 7 7 11   CREATININE mg/dL 0.48* 0.60 0.58   GLUCOSE mg/dL 98 97 107*   CALCIUM mg/dL 8.0* 7.5* 7.9*   PHOSPHORUS mg/dL 3.7 2.3* 1.8*       A/P: 39 y.o. female POD4 s/p laparoscopic low anterior colon resection with mobilization of splenic flexure and diverting loop ileostomy    -afebrile, VSS  -H/H stable  -Dr. Ye restarted Xarelto  -begin fiber tabs for high-output ileostomy  -increase ambulation     Parul Rios PA-C  8:04 AM   Previous Accession (Optional): [id number] Previous Accession (Optional): [id number]

## 2023-10-20 DIAGNOSIS — C20 ADENOCARCINOMA OF RECTUM: Chronic | ICD-10-CM

## 2023-10-20 DIAGNOSIS — F33.9 MONOPOLAR DEPRESSION: ICD-10-CM

## 2023-10-20 RX ORDER — ONDANSETRON HYDROCHLORIDE 8 MG/1
TABLET, FILM COATED ORAL
Qty: 60 TABLET | Refills: 1 | Status: SHIPPED | OUTPATIENT
Start: 2023-10-20

## 2023-10-20 RX ORDER — ESCITALOPRAM OXALATE 10 MG/1
TABLET ORAL
Qty: 90 TABLET | Refills: 0 | Status: SHIPPED | OUTPATIENT
Start: 2023-10-20

## 2023-10-20 RX ORDER — METOPROLOL SUCCINATE 25 MG/1
12.5 TABLET, EXTENDED RELEASE ORAL NIGHTLY
Qty: 45 TABLET | Refills: 0 | Status: SHIPPED | OUTPATIENT
Start: 2023-10-20

## 2023-10-20 RX ORDER — ENOXAPARIN SODIUM 100 MG/ML
1 INJECTION SUBCUTANEOUS EVERY 12 HOURS SCHEDULED
Qty: 60 ML | Refills: 2 | Status: SHIPPED | OUTPATIENT
Start: 2023-10-20

## 2023-10-27 ENCOUNTER — HOSPITAL ENCOUNTER (OUTPATIENT)
Dept: CT IMAGING | Facility: HOSPITAL | Age: 43
Discharge: HOME | End: 2023-10-27
Admitting: INTERNAL MEDICINE
Payer: COMMERCIAL

## 2023-10-27 ENCOUNTER — LAB (OUTPATIENT)
Dept: OTHER | Facility: HOSPITAL | Age: 43
End: 2023-10-27
Payer: COMMERCIAL

## 2023-10-27 ENCOUNTER — INFUSION (OUTPATIENT)
Dept: ONCOLOGY | Facility: HOSPITAL | Age: 43
End: 2023-10-27
Payer: COMMERCIAL

## 2023-10-27 DIAGNOSIS — C78.02 MALIGNANT NEOPLASM METASTATIC TO BOTH LUNGS: ICD-10-CM

## 2023-10-27 DIAGNOSIS — C78.01 MALIGNANT NEOPLASM METASTATIC TO BOTH LUNGS: ICD-10-CM

## 2023-10-27 DIAGNOSIS — C20 ADENOCARCINOMA OF RECTUM: ICD-10-CM

## 2023-10-27 DIAGNOSIS — R91.1 PULMONARY NODULE, RIGHT: ICD-10-CM

## 2023-10-27 DIAGNOSIS — C20 ADENOCARCINOMA OF RECTUM: Primary | ICD-10-CM

## 2023-10-27 LAB
ALBUMIN SERPL-MCNC: 3.9 G/DL (ref 3.5–5.2)
ALBUMIN/GLOB SERPL: 1.1 G/DL
ALP SERPL-CCNC: 102 U/L (ref 39–117)
ALT SERPL W P-5'-P-CCNC: 12 U/L (ref 1–33)
ANION GAP SERPL CALCULATED.3IONS-SCNC: 9.3 MMOL/L (ref 5–15)
AST SERPL-CCNC: 13 U/L (ref 1–32)
BASOPHILS # BLD AUTO: 0.01 10*3/MM3 (ref 0–0.2)
BASOPHILS NFR BLD AUTO: 0.2 % (ref 0–1.5)
BILIRUB SERPL-MCNC: 0.3 MG/DL (ref 0–1.2)
BUN SERPL-MCNC: 9 MG/DL (ref 6–20)
BUN/CREAT SERPL: 12 (ref 7–25)
CALCIUM SPEC-SCNC: 9.9 MG/DL (ref 8.6–10.5)
CEA SERPL-MCNC: 1.58 NG/ML
CHLORIDE SERPL-SCNC: 104 MMOL/L (ref 98–107)
CO2 SERPL-SCNC: 26.7 MMOL/L (ref 22–29)
CREAT SERPL-MCNC: 0.75 MG/DL (ref 0.57–1)
DEPRECATED RDW RBC AUTO: 47.6 FL (ref 37–54)
EGFRCR SERPLBLD CKD-EPI 2021: 100.8 ML/MIN/1.73
EOSINOPHIL # BLD AUTO: 0.12 10*3/MM3 (ref 0–0.4)
EOSINOPHIL NFR BLD AUTO: 2.1 % (ref 0.3–6.2)
ERYTHROCYTE [DISTWIDTH] IN BLOOD BY AUTOMATED COUNT: 13.8 % (ref 12.3–15.4)
GLOBULIN UR ELPH-MCNC: 3.4 GM/DL
GLUCOSE SERPL-MCNC: 98 MG/DL (ref 65–99)
HCT VFR BLD AUTO: 46.6 % (ref 34–46.6)
HGB BLD-MCNC: 15.1 G/DL (ref 12–15.9)
IMM GRANULOCYTES # BLD AUTO: 0.01 10*3/MM3 (ref 0–0.05)
IMM GRANULOCYTES NFR BLD AUTO: 0.2 % (ref 0–0.5)
LYMPHOCYTES # BLD AUTO: 1.23 10*3/MM3 (ref 0.7–3.1)
LYMPHOCYTES NFR BLD AUTO: 21.1 % (ref 19.6–45.3)
MCH RBC QN AUTO: 30.3 PG (ref 26.6–33)
MCHC RBC AUTO-ENTMCNC: 32.4 G/DL (ref 31.5–35.7)
MCV RBC AUTO: 93.6 FL (ref 79–97)
MONOCYTES # BLD AUTO: 0.35 10*3/MM3 (ref 0.1–0.9)
MONOCYTES NFR BLD AUTO: 6 % (ref 5–12)
NEUTROPHILS NFR BLD AUTO: 4.1 10*3/MM3 (ref 1.7–7)
NEUTROPHILS NFR BLD AUTO: 70.4 % (ref 42.7–76)
NRBC BLD AUTO-RTO: 0 /100 WBC (ref 0–0.2)
PLATELET # BLD AUTO: 231 10*3/MM3 (ref 140–450)
PMV BLD AUTO: 9.5 FL (ref 6–12)
POTASSIUM SERPL-SCNC: 4.5 MMOL/L (ref 3.5–5.2)
PROT SERPL-MCNC: 7.3 G/DL (ref 6–8.5)
RBC # BLD AUTO: 4.98 10*6/MM3 (ref 3.77–5.28)
SODIUM SERPL-SCNC: 140 MMOL/L (ref 136–145)
WBC NRBC COR # BLD: 5.82 10*3/MM3 (ref 3.4–10.8)

## 2023-10-27 PROCEDURE — 71260 CT THORAX DX C+: CPT

## 2023-10-27 PROCEDURE — 74177 CT ABD & PELVIS W/CONTRAST: CPT

## 2023-10-27 PROCEDURE — 25510000001 IOPAMIDOL 61 % SOLUTION: Performed by: INTERNAL MEDICINE

## 2023-10-27 PROCEDURE — 0 DIATRIZOATE MEGLUMINE & SODIUM PER 1 ML: Performed by: INTERNAL MEDICINE

## 2023-10-27 PROCEDURE — 82378 CARCINOEMBRYONIC ANTIGEN: CPT | Performed by: INTERNAL MEDICINE

## 2023-10-27 PROCEDURE — 80053 COMPREHEN METABOLIC PANEL: CPT | Performed by: INTERNAL MEDICINE

## 2023-10-27 PROCEDURE — 85025 COMPLETE CBC W/AUTO DIFF WBC: CPT | Performed by: INTERNAL MEDICINE

## 2023-10-27 RX ADMIN — IOPAMIDOL 100 ML: 612 INJECTION, SOLUTION INTRAVENOUS at 10:52

## 2023-10-27 RX ADMIN — DIATRIZOATE MEGLUMINE AND DIATRIZOATE SODIUM 30 ML: 660; 100 LIQUID ORAL; RECTAL at 09:25

## 2023-11-01 NOTE — PROGRESS NOTES
Progress Note      S: Had ~8 BMs last night, which is normal per Pt. Is receiving IVF, but expresses concerns about having an accident as a result of being attached to the IV pole with delay getting to the bathroom. Tolerating ice chips without N/V. Slight tenderness to the LLQ, but denies any significant pain or bloating.     O:  Temp:  [97.9 °F (36.6 °C)-98.7 °F (37.1 °C)] 98.4 °F (36.9 °C)  Heart Rate:  [56-72] 69  Resp:  [17-18] 17  BP: (122-146)/(61-80) 142/75      Intake/Output Summary (Last 24 hours) at 7/24/2023 0744  Last data filed at 7/24/2023 0254  Gross per 24 hour   Intake 1000 ml   Output 300 ml   Net 700 ml       Abd:   soft, non-distended      Results from last 7 days   Lab Units 07/24/23  0450   SODIUM mmol/L 139   POTASSIUM mmol/L 3.6   CHLORIDE mmol/L 107   CO2 mmol/L 23.9   BUN mg/dL 9   CREATININE mg/dL 0.60   EGFR mL/min/1.73 114.4   GLUCOSE mg/dL 79   CALCIUM mg/dL 8.5*   PHOSPHORUS mg/dL 2.0*       Results from last 7 days   Lab Units 07/24/23  0450   MAGNESIUM mg/dL 2.1       A/P: 42 y.o. female with partial small bowel obstruction. Improving.     - Full liquid diet. If tolerates this, can advance to a regular diet for lunch.  - D/C IVF.  - Phosphorus 2.0. Will replace.   - Continue Lovenox due to Hx of Factor V Leiden  - Possible D/C home this afternoon            Quality 226: Preventive Care And Screening: Tobacco Use: Screening And Cessation Intervention: Patient screened for tobacco use and is an ex/non-smoker Detail Level: Detailed Quality 130: Documentation Of Current Medications In The Medical Record: Current Medications Documented Quality 431: Preventive Care And Screening: Unhealthy Alcohol Use - Screening: Patient not identified as an unhealthy alcohol user when screened for unhealthy alcohol use using a systematic screening method

## 2023-11-03 ENCOUNTER — OFFICE VISIT (OUTPATIENT)
Dept: ONCOLOGY | Facility: CLINIC | Age: 43
End: 2023-11-03
Payer: COMMERCIAL

## 2023-11-03 VITALS
TEMPERATURE: 98.7 F | DIASTOLIC BLOOD PRESSURE: 70 MMHG | SYSTOLIC BLOOD PRESSURE: 101 MMHG | BODY MASS INDEX: 31.21 KG/M2 | WEIGHT: 194.2 LBS | OXYGEN SATURATION: 95 % | HEIGHT: 66 IN | HEART RATE: 83 BPM | RESPIRATION RATE: 18 BRPM

## 2023-11-03 DIAGNOSIS — C78.01 MALIGNANT NEOPLASM METASTATIC TO BOTH LUNGS: ICD-10-CM

## 2023-11-03 DIAGNOSIS — C78.02 MALIGNANT NEOPLASM METASTATIC TO BOTH LUNGS: ICD-10-CM

## 2023-11-03 DIAGNOSIS — R93.89 ABNORMAL FINDING ON IMAGING: ICD-10-CM

## 2023-11-03 DIAGNOSIS — R91.8 MULTIPLE PULMONARY NODULES: ICD-10-CM

## 2023-11-03 DIAGNOSIS — C20 ADENOCARCINOMA OF RECTUM: Primary | ICD-10-CM

## 2023-11-03 DIAGNOSIS — Z79.01 ANTICOAGULATION ADEQUATE: ICD-10-CM

## 2023-11-03 DIAGNOSIS — D64.9 ANEMIA, UNSPECIFIED TYPE: ICD-10-CM

## 2023-11-03 RX ORDER — ESTRADIOL 10 UG/1
10 INSERT VAGINAL 2 TIMES WEEKLY
COMMUNITY
Start: 2023-10-30 | End: 2024-10-29

## 2023-11-03 NOTE — PROGRESS NOTES
REASON FOR FOLLOW UP:     Rectal cancer, rectal ultrasound examination in July 2019 reported T3N0 disease without lymphadenopathy. She does have small pulmonary nodule 6-7 mm and 2 mm with indeterminate feature. There is no solid evidence of metastatic disease otherwise. Patient has stage IIA (T3N0M0) disease.  The patient is heterozygous factor V Leiden, was on prophylactic anticoagulation with Lovenox 40 mg daily given her increased risk of thrombosis with MediPort and GI malignancy.   PET scan on 8/8/2019 reported an 8 mm hypermetabolic right upper lobe pulmonary nodule, which is suspicious for metastatic as well.    Patient had a PowerPort placement on the left upper chest by Dr. Joseph on 8/9/2019.  Patient was started on concurrent infusional 5-FU chemoradiation therapy on 8/12/2019, with planned complete surgical resection and further adjuvant chemotherapy with intention to cure the disease.   Patient underwent surgical resection of the primary rectal cancer by Dr. Ye on 12/2/2019.  Pathology evaluation reported residual T3N1 disease stage IIIb.  Diarrhea related to therapy and radiation.   Patient was started cycle 1 day 1 of adjuvant FOLFOX 6 on 1/23/2020.  On 2/5/2020 FOLFOX 6 cycle 2 delayed secondary to neutropenia.  Patient was given 3 days of Granix injection.  After cycle #2 we planned 3 days of Granix with each cycle.   Patient also intolerant of oral iron.  Patient received 2 doses of IV injectafer on 02/05/2020 and 02/12/2020.   02/12/2020 Proceed with cycle #2 of FOLFOX 6 with 3 days of Granix.  On 3/11/2020 cycle 4 postponed secondary to abdominal pain and occasional low-grade fevers.  CT scans ordered.  Cycle #4 resumed after CT scan revealed no evidence of disease.  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 Ye. patient has no fever.  Continue to observe.   Grade 3 hand-foot syndrome secondary to 5-FU after cycle #7  FOLFOX 6 chemotherapy, prompting ER visit.  Also has worsening peripheral neuropathy.   Cycle #8 FOLFOX 6 was given on 5/13/2020, with 50% dose reduction for both 5-FU and oxaliplatin, and also examination of bolus 5-FU.   PET scan examination on 5/21/2020 reported no evidence of metastatic disease in the chest abdomen pelvis.  Stable 6 mm RUL pulmonary nodule.  On 5/27/2020, I discussed with the patient that we can discontinue chemotherapy at this time.   Patient had a surgical procedure for low anterior colon resection, coloanal anastomosis on 8/3/2020.  CT scan for chest abdomen pelvis on 9/9/2020 reported a new 8 mm noncalcified pulmonary nodule in the left lower lobe of the lung.  Otherwise stable right upper lobe 6 mm pulmonary nodule, and no evidence of disease recurrence in the abdomen or pelvis.  PET scan examination on 9/18/2020 reported multiple hypermetabolic small pulmonary nodules/ new pulmonary nodules.   Patient was started on pelvic chemotherapy with FOLFIRI regimen on 10/13/2020.   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 It reported tumor mutation burden 5 Muts/Mb, microsatellite stable, TP53 mutation R282W, and others.   Cycle #5 was without irinotecan, due to peripheral neuropathy.  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  Patient was switched to weight-based Lovenox injection.  Cycle #6 5-FU and irinotecan was delayed by 2 weeks because of the above incident.  Patient had a telemedicine evaluation at that the [facility] [place] in February 2021.  They agreed with our treatment plan for palliative chemotherapy followed by maintenance chemotherapy.    PET scan examination on 4/6/2021 after cycle #12 palliative chemotherapy reported no evidence of hypermetabolic metastatic lesion.   Patient was started on maintenance chemotherapy with 5-FU and  Avastin on 4/13/2021. (Unable to tolerate Xeloda because of a significant hand-foot syndrome).   PET scan on 9/24/2021 obtained after cycle #12 maintenance chemotherapy with 5-FU, leucovorin, Avastin reported no evidence for active disease. We recommended 3 months chemotherapy holiday.  CT scan examination on 3/15/2022 reported slightly disease progression with increase in size of small pulmonary nodule.  We started patient back on maintenance chemotherapy on 3/29/2022 treatment with 5-FU plus leucovorin and Avastin, repeating every 2 weeks.  After 6 more maintenance chemotherapy, CT scan for chest abdomen pelvis was done on 6/21/2022 which reported stable sub-6 mm pulmonary nodules.  Patient had last maintenance chemotherapy on 6/7/2022.       HISTORY OF PRESENT ILLNESS:  The patient is a 43 y.o. female with the above-mentioned issue who presented today, on 11/03/2023 for evaluation after recent CT scan of the chest, abdomen, and pelvis obtained on 10/27/2023.     The CT scan for the chest reported enlarging pulmonary nodules bilaterally. The right upper lobe lung nodule increased from 7 x 7 mm to 9 x 8 mm, and the left upper lobe nodule measured 8 x 9 mm from 5 x 6 mm in 04/2023. There is a different left upper lobe nodule 6 x 8 mm from previous 5 x 5 mm. The presacral tissue thickness measures up to 2.3 cm.    A laboratory study on 10/27/2023 reported CEA 1.58 ng/mL, normal CBC, and CMP.  Molecular study of peripheral blood Guardant Reveal is still pending.        Past Medical History:   Diagnosis Date    Abdominopelvic abscess 08/12/2020    ADMITTED TO [facility]    Abdominopelvic abscess 08/12/2020    Abnormal Pap smear of cervix 02/02/1998    JULIUS I    Abscess of abdominal wall 11/28/2012    SEEN AT [facility] ER    Anemia in pregnancy     Anxiety     Bilateral epiphora 04/2020    Bilateral hand swelling 05/02/2020    SEEN AT [facility] ER    Cervical lymphadenitis 06/06/2012    SEEN AT [facility] ER    Chemotherapy induced diarrhea  01/2021    Chemotherapy induced neutropenia 04/2020    Chemotherapy-induced nausea 02/2020    Chemotherapy-induced thrombocytopenia 05/2020    Chronic anticoagulation     Chronic diarrhea     Colon polyps     FOLLOWED BY DR. 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 ALEXANDRU HUNT    History of gestational diabetes     History of pre-eclampsia 1998    History of pre-eclampsia     History of radiation therapy 2019    FOLLOWED BY DR. JAVON LEWIS    History of TB skin testing 01/17/2009    TB Skin Test    History of TB skin testing 01/17/2009    TB Skin Test    History of transfusion 2019    12/2019    HPV in female 1998    Hypokalemia 09/2019    Hypomagnesemia 09/2019    Hyponatremia 06/2021    IBS (irritable bowel syndrome)     IBS (irritable bowel syndrome)     Ileostomy in place     FOLLOWED BY DR. GERONIMO ESPARZA    Ileostomy present 04/25/2020    Take down on 11/3/2022    Infected insect bite of neck 05/27/2016    SEEN AT [facility]    Kidney stones 08/09/2007    SEEN AT [facility] ER    Low anterior resection syndrome 12/2022    FOLLOWED BY DR. 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    Perirectal abscess  2020    Pulmonary embolism without acute cor pulmonale 09/20/2019    X 3; ADMITTED TO [facility]    Pulmonary nodule, right 2020    Rectal cancer 2019    STAGE IIA, INVASIVE MODERATELY DIFFERENTIATED ADENOCARCINOMA, CHEMO AND XRT FINISHED 2019    Right shoulder pain 2020    SEEN AT [facility] ER    Right ureteral stone 10/01/2019    SEEN AT [facility] ER    SAB (spontaneous ) 1996     A2-1 INDUCED    Sciatica     Sepsis due to cellulitis 2002    LEFT GREAT TOE, ADMITTED TO [facility]    Sepsis due to cellulitis 2002    LEFT GREAT TOE, ADMITTED TO [facility]    Tachycardia 2020    Thrombosis of superior vena cava 2020    AND BRACHIOCEPHALIC VEIN, ADMITTED TO [facility]    Urinary urgency 2020   Patient had COVID-19 infection diagnosed on 2022 despite was fully vaccinated and boosted.  She recovered without problem.      Past Surgical History:   Procedure Laterality Date    ABDOMINAL SURGERY      CHOLECYSTECTOMY N/A 10/10/2003    LAPAROSCOPIC WITH CHOLANGIOGRAM, DR. JAMEY TALAVERA AT [facility]    COLON RESECTION N/A 2019    Procedure: laparoscopic low anterior colon resection with mobilization of splenic flexure and diverting loop ileostomy: ERAS;  Surgeon: Padmaja Esparza MD;  Location: [facility];  Service: General    COLON RESECTION N/A 2020    Procedure: LOW ANTERIOR COLON RESECTION, COLOANAL ANASTOMOSIS, MOBILIZATION SPLENIC FLEXURE;  Surgeon: Padmaja Esparza MD;  Location: [facility] MAIN OR;  Service: General;  Laterality: N/A;    COLONOSCOPY N/A 07/15/2020    PATENT ANASTAMOSIS IN MID RECTUM, RESCOPE IN 1 YR, DR. PADMAJA ESPARZA AT [facility]    COLONOSCOPY N/A 2022    DIFFUSE AREA OF MODERATELY FRIABLE MUCOSA IN ENTIRE COLON , DIVERSION COLITIS, PATENT AND HEALTHY ANASTAMOSIS, DR. PADMAJA ESPARZA AT [facility]    COLONOSCOPY W/ POLYPECTOMY N/A 2019    15 MM TUBULOVILLOUS ADENOMA POLYP IN HEPATIC FLEXURE, 20 MMTUBULOVILLOUS ADENOMA WITH HIGH GRADE DYSPLASIA IN RECTOSIGMOID, 4 CM  MASS IN MID RECTUM, PATH: INVASIVE MODERATELY DIFFERENTIATED ADENOCARCINOMA, DR. JENNIFER LI AT [facility]    DILATATION AND EVACUATION N/A 2009    ENDOSCOPY N/A 07/08/2019    LA GRADE A ESOPHAGITIS, GASTRITIS, ALL BIOPSIES BENIGN, DR. JENNIFER LI AT [facility]    ILEOSTOMY CLOSURE N/A 11/14/2022    Procedure: ILEOSTOMY TAKEDOWN;  Surgeon: Geronimo Ye MD;  Location: [facility] MAIN OR;  Service: General;  Laterality: N/A;    INCISION AND DRAINAGE PERIRECTAL ABSCESS N/A 08/14/2020    Procedure: INCISION AND DRAINAGE OF retrorectal dehiscence abcess with drain placement and irrigation;  Surgeon: Geronimo Ye MD;  Location: [facility] MAIN OR;  Service: General;  Laterality: N/A;    PAP SMEAR N/A 01/24/2014    SIGMOIDOSCOPY N/A 07/24/2019    INFILTRATIVE PARTIALLY OBSTRUCTING LARGE RECTAL CANCER, AREA TATOOED, DR. GERONIMO YE AT [facility]    SIGMOIDOSCOPY N/A 11/23/2019    AN ULCERATED PARTIALLY OBSTRUCTING MASS IN MID RECTUM, AREA TATTOOED, DR. GERONIMO YE AT [facility]    SIGMOIDOSCOPY N/A 07/22/2021    PATENT ANASTAMOSIS IN DISTAL RECTUM, RESCOPE IN 1 YR, DR. GERONIMO YE AT [facility]    TRANSRECTAL ULTRASOUND N/A 07/24/2019    Procedure: ULTRASOUND TRANSRECTAL;  Surgeon: Geronimo Ye MD;  Location: [facility] ENDOSCOPY;  Service: General    URETEROSCOPY LASER LITHOTRIPSY WITH STENT INSERTION Right 10/30/2019    DR. ESTUARDO BEASLEY AT [facility]    VAGINAL DELIVERY N/A 09/18/1998    VENOUS ACCESS DEVICE (PORT) INSERTION Left 08/09/2019    Procedure: INSERTION VENOUS ACCESS DEVICE;  Surgeon: Sj Joseph MD;  [place] [facility] [place] [facility] [place]  Service: General    VENOUS ACCESS DEVICE (PORT) INSERTION N/A 12/18/2020    Procedure: INSERTION VENOUS ACCESS DEVICE right side, removal venous access device left side;  Surgeon: Sj Joseph MD;  [place] [facility] [place] [facility] [place]  Service: General;  Laterality: N/A;    WISDOM TOOTH EXTRACTION Bilateral 1993       HEMATOLOGIC/ONCOLOGIC HISTORY:      The patient reports she has  intermittent rectal bleeding and urgency, mucous with her stool, starting sometime in 2016. At that time she was referred to GI service, and was diagnosed of irritable bowel syndrome. Nevertheless she had worsening urgency for bowel movement, and had a couple of incidents losing control of stool. She was recently seen by Roland Thorpe MD again and had colonoscopy and EGD exam on 07/08/2019. She was found having a circumferential rectal mass. According to the procedure note, the patient had a fungating circumferential bleeding 4 cm mass in the middle rectum, at distance between 13 cm and 17 cm from the anus. Mass was causing partial obstruction. There were also colon polyps found at the hepatic flexure and also at the rectosigmoid junction 23 cm from the anus. Both were resected and retrieved. EGD examination reported grade A esophagitis at the GE junction and patchy discontinuous erythema and aggravation of the mucosa without bleeding in the stomach body. There is normal mucosa of the duodenum. Pathology evaluation from this procedure reported moderately differentiated adenocarcinoma involving the rectal mass. The rectal sigmoid junction polyp was tubular/tubulovillous adenoma with high grade dysplasia negative for invasive malignancy. The hepatic flexure polyp was also tubular/tubulovillous adenoma negative for high grade dysplasia or malignancy. The biopsy from the esophagus reports squamocolumnar mucosa with inflammatory changes suggestive of mild reflux esophagitis, negative for interstitial metaplasia. Gastric biopsy was benign and duodenal biopsy was also benign. There is no mention of H-pylori.     Patient was subsequently referred to colorectal surgeon Padmaja Ye MD for further evaluation. The patient had CT scan examination for chest, abdomen and pelvis requested by Dr. Ye and were done on 07/13/2019. The study reported no evidence of lymphadenopathy in the abdomen and pelvis. There was wall thickening  of the rectosigmoid junction. Normal spleen, pancreas, adrenal glands and kidneys. There was fatty liver infiltration but no focal lymphatic lesions. There was a small 6-7 mm noncalcified nodule in the right upper lobe and a tiny 3 mm subpleural nodule in the right middle lobe. No mediastinal or hilar lymphadenopathy.     Dr. Ye performed staging rectal ultrasound examination. This study reported tumor penetrating through the muscularis propria as T3 disease; there were no lymph nodes identified.    She had a hospitalization in late September 2019 because of newly discovered pulmonary emboli.  She was on prophylactic Lovenox prior to the incident of PE.  Now she is on full dose Xarelto anticoagulation.  Patient reports no further chest pain dyspnea.  She denies bleeding or bruising.  During her hospitalization, she was seen by Dr. Ye, who plans to have surgery 8 to 12 weeks after finishing radiation therapy.  She finished her radiation on 9/19/2019.     I noticed patient also presented to the emergency room on 10/1/2019 complaining of right flank area pain.  I reviewed the images studies and indeed she has a very small 1 to 2 mm obstructing kidney stone in the UV junction.  Patient is still symptomatic with some pains and dysuria.  She denies fever sweating or chills.    Laboratory study on 10/7/2019 reported normal CBC including hemoglobin 13.1, platelets 301,000, WBC 6170 and ANC 4900 lymphocytes 590 monocytes 480.      The nurse reported malfunction of port-a-catheter on 10/7/2019.  Port study on 10/8/2019 showed fibrin sheath around the distal aspect of the Mediport catheter in the SVC. This does not appear to hinder injection, but does prevent aspiration at this time.    Patient underwent surgical resection of the colon on 12/2/2019 with Dr. Ye.  Pathology evaluation reported residual T3 disease, also 1 out of 14 lymph nodes positive for malignancy.  So this patient in either had at least stage IIIb  disease (T3 N1 M0?).      Adjuvant chemotherapy FOLFOX 6 will be started on 1/22/2020.  Laboratory study reported iron saturation 10%, free iron 31 TIBC 319 and ferritin 168.  Her hemoglobin was 11.8, WBC 5600, and platelets 347,000.    Patient was here on 02/12/2020 for cycle 2 of her FOLFOX.  This is delayed x1 week secondary to neutropenia.  The patient ultimately received 3 days worth of Neupogen with recovery of her blood counts.  Of note, the patient struggled with significant nausea without any episodes of vomiting following cycle #1 of chemotherapy resulting in significant weight loss.  She is up 12 pounds over the last week as her appetite has normalized.  We will add Emend to her care plan.    She is having several loose stools per her colostomy and has been hesitant to take Imodium due to prior history of constipation.  I encouraged her to try this with a maximum of 8 tablets/day.  She denies any infectious symptoms including fevers or chills.  No excess bleeding or bruising noted.  She had the expected cold sensitivity related to the Oxaliplatin for about 3 days following treatment.    Labs from 02/12/2020 demonstrated total white blood cell count of 5.14, ANC of 3.06, hemoglobin of 11.2, platelets of 211,000.  She was found to be iron deficient last week and is intolerant to oral iron secondary to GI distress.  For this reason, she initiated IV iron therapy with Injectafer last week.  She had received her second dose 02/12/2020    Patient has normalized hemoglobin 12.2 on 2/26/2020.    She reported on 5/5/2020 she had a recent visit to the emergency department for what was suspected to be an allergic reaction.  She called our on-call service on Saturday with reports of hand and face swelling.  She was instructed to proceed to the emergency department at which time she was assessed and prescribed a Medrol Dosepak.  She had just completed her 5-FU and was unhooked on Friday, 5/3/2020.  Her symptoms have  improved.  She does report persistent hyperpigmentation and mild swelling of the palms of the hands but this is much improved.  It was her right hand specifically that was swollen.  Her facial swelling has resolved.  She continues on Cefdinir nd since has 1 day remaining, she was prescribed cefdinir for a UTI requiring hospitalization at the end of April.  Reports no new symptoms.  Her labs are stable.  She is scheduled for treatment again.    Patient states at this time she is not tolerating her chemotherapy well.     Patient seen in the emergency department on 5/2/2020 for what was suspected to be an allergic reaction.  She called our on-call service on Saturday explaining that she was experiencing hand and face swelling.  She was instructed to proceed to the emergency department at which time she was assessed and prescribed a Medrol Dosepak.  She had just completed her 5-FU and was unhooked on Friday, 5/1/2020.      She reports since her ED visit on 5/2/2020 her symptoms have not improved. Her hands and feet were swollen upon presenting to the ED and she could barely make a fist. She states that she feels so swollen she is not able to stand it. She states that her skin on the hands and feet are peeling extensively as well, besides changing color to more dark.     She also reports significant fatigue after her first week of the 5-FU treatment but she expected this side effect. She also notices significant increase in her neuropathy to the point that she is not able to even walk around in her home without her house slippers due to irritation from her rugs. She denies and associated nausea or vomiting at this time. She also has episodes of epistaxis every day after the chemotherapy cycle #7.  She does report working full-time during the week of chemotherapy.    Laboratory studies, 5/13/2020, show moderate thrombocytopenia platelets 123,000, low normal WBC 4140 including ANC 2720 and normal hemoglobin 13.4.  Because  significant hand-foot syndrome, will decrease both 5-FU and oxaliplatin by 50%, and eliminate bolus dose of 5-FU.    On 5/21/2020 patient had a PET scan performed which showed no convincing evidence of residual disease in abdomen or pelvis, no metastatic disease within the chest or neck.     Cycle #8 FOLFOX 6 was given on 5/13/2020, with 50% dose reduction for both 5-FU and oxaliplatin, and also examination of bolus 5-FU.  She states on 5/27/2020 that with the recent reduction of the chemotherapy she feels significantly better. She has more energy and is able to do her daily routine.      PET scan examination on 5/21/2020 reported no evidence of metastatic disease in chest abdomen pelvis.  There was a stable 6 mm right upper lobe pulmonary nodule.    Laboratory studies on 5/27/2020 showed mild leukocytopenia WBC 3720 but a normal ANC 2250 and lymphocytes 630.  Normal platelets 163,000 and hemoglobin 12.6.  Chemistry lab reported normal renal function, liver function panel and a electrolytes, elevated glucose 164.    Laboratory studies 6/24/2020, showed normal hemoglobin 13.4 but macrocytosis .9.  Normal platelets 210,000 and WBC 5870.  She had normal CMP.     Patient last time was here in late June 2020.  Since that time she had surgical procedure for low anterior colon resection, coloanal anastomosis on 8/3/2020.  She later developed a perirectal abscess, required surgical drainage on 8/14/2020.  She was discharged on 8/27/2020.    Patient reports to me she still has lower abdominal wall vacuum suction in place.  She denies fever sweating chills.  Performance status is ECOG 1.  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  She does have visiting nurse come to the home for wound care.    CT scan for chest abdomen pelvis on 9/9/2020 reported a new 8 mm noncalcified pulmonary nodule in the left lower lobe.  Otherwise stable right upper lobe 6 mm pulmonary nodule, and no evidence of  disease recurrence in the abdomen or pelvis.     Laboratory study on 9/16/2020 reported elevated AST 55, ALT 95, alk phosphatase 143 but normal total bilirubin 0.3.  Chemistry lab otherwise unremarkable.  She has completed normal CBC.      Because of the abnormal CT scan examination for chest abdomen pelvis on 9/9/2020 reported a new 8 mm noncalcified pulmonary nodule in the left lower lobe, we obtained a PET scan examination for further evaluation, which was done on 9/18/2020.  This study reported several pulmonary nodules, some of them are hypermetabolic, newly developed compared to previous PET scan in May 2020.  Certainly does highly suspicious for metastatic disease.  There are also hypermetabolic activity in the abdomen and pelvic area which is hard to differentiate from surgical scar versus metastatic malignancy.    Laboratory study on 10/13/2020 reported normal CBC with Hb 13.9, platelets 302,000 and WBC 5520 including ANC 3830.  Chemistry lab reported normalized AST 20, alk phosphatase 116 improved ALT 35, and maintains normal bilirubin, with normal electrolytes and liver function panel.     Patient was started on first cycle of palliative chemotherapy FOLFIRI on 10/13/2020.    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  Patient had a new port placed 12/18/2020, and had held her Xarelto for 2 days prior to surgery.  She presented to the ER on 12/21/20 with complaints of facial and arm swelling for 3 days.  She also noted increased shortness of breath.  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  Thrombus within the right internal jugular vein cannot be excluded.  Patient was started on IV heparin, and eventually transitioned to weight-based Lovenox, which she now continues.    PET scan examination on 9/24/2021  reported further shrinking of the tiny right upper lobe pulmonary nodule.  Otherwise no new lesions.  No evidence for metastatic disease in other areas.      Patient had CT scan for chest with IV contrast obtained on 12/14/2021 which reported small tiny stable pulmonary nodules. There is a 4 mm right upper lobe pulmonary nodule. There is also a stable 4 mm left lower lobe pulmonary nodule. There is also stable left upper lobe micronodule.     Laboratory studies today on 12/21/2021 reported normal hemoglobin 15.9 however .0, platelets 218,000 WBC 5360 including neutrophils 3730 lymphocytes 980. Chemistry lab reported normal renal function, electrolytes, glucose, and marginally elevated ALT 55, AST 34 but normal total bilirubin and alk phosphatase.     CT scan for chest abdomen pelvis 6/21/2022 reported sub-6 mm pulmonary nodules either stable or slightly smaller.  No new pulmonary nodules or evidence for disease progression.  There is no evidence for metastatic disease in the liver however it shows diffuse hepatic steatosis.  There was masslike thickening in the presacral space with the clips or calcification approximately 1.7 cm in greatest AP dimension but stable.    Laboratory study on 9/20/2022 reported normal hemoglobin 15.7 with mild macrocytosis .1.  She has normal CBC and platelets.  She also has unremarkable CMP.  Normal CEA 1.13 ng/mL.    Patient was seen by Dr. Ye, who performed ileostomy takedown on 11/14/2022.  Patient reports that she is recovering.  She still has a small open wound less than 1 cm in diameter, however close to 2 cm deep.  She has been changing dressing herself.  She denies fever sweating chills.    Patient has no other specific complaints.  She has excellent performance status ECOG 0.  She denies chest pain dyspnea cough hemoptysis.  No abdominal pain.  No melena hematochezia.  Patient has been eating well, stable weight.  She works full-time.    She continues Lovenox  injections and denies any significant bleeding issues.   No other new problems or concerns.     CT scan for chest abdomen pelvis obtained on 1/10/2023 reported stable small pulmonary nodules, no evidence for active or new disease.    Laboratory study on 1/10/2023 reported normal CBC and normal CMP.  CEA level is 0.99 ng/mL.    CT scan for chest, abdomen, and pelvis on 7/7/2023 reported stable small pulmonary nodules including a left upper lobe 5 mm nodule. There were no new masses, or pleural effusion. No enlarged supraclavicular, axillary, mediastinal, or hilar lymphadenopathy. Mediastinal vasculature normal. There is occlusion of the superior vena around the catheter with body wall  is present. There was no evidence for disease recurrence in the abdomen or pelvis. Bone is also negative for metastatic disease.    Laboratory studies obtained on 07/07/2023 also reported normal CBC, and normal CMP as well as low level CEA 1.07 ng/mL.        MEDICATIONS    Current Outpatient Medications:     clonazePAM (KlonoPIN) 1 MG tablet, TAKE 1 TABLET BY MOUTH THREE TIMES A DAY AS NEEDED FOR ANXIETY, Disp: 90 tablet, Rfl: 0    Cyanocobalamin (VITAMIN B-12 PO), Take 1 tablet by mouth Every Other Day., Disp: , Rfl:     dicyclomine (BENTYL) 20 MG tablet, Take 1 tablet by mouth Every 6 (Six) Hours As Needed (for irritable bowel symptoms). Indications: Irritable Bowel Syndrome, Disp: 360 tablet, Rfl: 2    diphenoxylate-atropine (LOMOTIL) 2.5-0.025 MG per tablet, TAKE 2 TABLETS BY MOUTH 4 TIMES A DAY, Disp: 240 tablet, Rfl: 2    Enoxaparin Sodium (LOVENOX) 100 MG/ML solution prefilled syringe syringe, INJECT 1 ML UNDER THE SKIN INTO THE APPROPRIATE AREA AS DIRECTED EVERY 12 (TWELVE) HOURS., Disp: 60 mL, Rfl: 2    escitalopram (LEXAPRO) 10 MG tablet, TAKE 1 TABLET BY MOUTH EVERY DAY, Disp: 90 tablet, Rfl: 0    famotidine (PEPCID) 20 MG tablet, TAKE 1 TABLET BY MOUTH TWICE A DAY, Disp: 180 tablet, Rfl: 1    Imvexxy Maintenance  Pack 10 MCG insert, Insert 10 mcg into the vagina 2 (Two) Times a Week., Disp: , Rfl:     Loperamide HCl (IMODIUM PO), Take  by mouth., Disp: , Rfl:     Loratadine 10 MG capsule, Take 1 capsule by mouth Every Evening., Disp: , Rfl:     metoprolol succinate XL (TOPROL-XL) 25 MG 24 hr tablet, TAKE 0.5 TABLETS BY MOUTH EVERY NIGHT, Disp: 45 tablet, Rfl: 0    ondansetron (ZOFRAN) 8 MG tablet, TAKE 1 TABLET BY MOUTH THREE TIMES A DAY AS NEEDED FOR NAUSEA AND VOMITING, Disp: 60 tablet, Rfl: 1    promethazine (PHENERGAN) 25 MG tablet, Take 1 tablet by mouth Every 6 (Six) Hours As Needed for Nausea or Vomiting., Disp: 60 tablet, Rfl: 2    sulfacetamide (BLEPH-10) 10 % ophthalmic ointment, Administer  to both eyes Every 6 (Six) Hours. (Patient not taking: Reported on 11/3/2023), Disp: 3.5 g, Rfl: 0    vitamin D (ERGOCALCIFEROL) 1.25 MG (57655 UT) capsule capsule, Take 1 capsule by mouth 1 (One) Time Per Week. (Patient not taking: Reported on 11/3/2023), Disp: , Rfl:     ALLERGIES:   No Known Allergies    SOCIAL HISTORY:       Social History     Tobacco Use    Smoking status: Every Day     Packs/day: 1.00     Years: 25.00     Additional pack years: 0.00     Total pack years: 25.00     Types: Electronic Cigarette, Cigarettes     Passive exposure: Current    Smokeless tobacco: Never    Tobacco comments:     1 PPD/caffeine use    Vaping Use    Vaping Use: Some days    Substances: Nicotine    Devices: Disposable    Passive vaping exposure: Yes   Substance Use Topics    Alcohol use: Not Currently     Comment: RARELY    Drug use: Never         FAMILY HISTORY:   Mother has positive factor V Leiden mutation, although she did not have thrombosis, mother also is coronary disease with stenting, she also is occasional bruising.    Maternal grandmother had DVT, she had multiple surgical procedures.    Patient mother's half-brother had metastatic colon cancer at diagnosis in his 50s.    Maternal great aunt had breast cancer.          "  Vitals:    11/03/23 1443   BP: 101/70   Pulse: 83   Resp: 18   Temp: 98.7 °F (37.1 °C)   TempSrc: Temporal   SpO2: 95%   Weight: 88.1 kg (194 lb 3.2 oz)   Height: 167.6 cm (65.98\")   PainSc: 0-No pain         11/3/2023     2:48 PM   Current Status   ECOG score 0     Physical Exam  Vitals reviewed.   Constitutional:       General: She is not in acute distress.     Appearance: Normal appearance. She is well-developed.   HENT:      Head: Normocephalic and atraumatic.      Comments:         Nose: Nose normal.   Eyes:      Conjunctiva/sclera: Conjunctivae normal.   Cardiovascular:      Rate and Rhythm: Normal rate and regular rhythm.      Heart sounds: Normal heart sounds.   Pulmonary:      Effort: Pulmonary effort is normal.      Breath sounds: Normal breath sounds.   Abdominal:      General: Bowel sounds are normal.      Palpations: Abdomen is soft. There is no mass.      Tenderness: There is no abdominal tenderness.   Musculoskeletal:      Right lower leg: No edema.      Left lower leg: No edema.   Lymphadenopathy:      Cervical: No cervical adenopathy.   Skin:     General: Skin is warm and dry.   Neurological:      Mental Status: She is alert and oriented to person, place, and time. Mental status is at baseline.   Psychiatric:         Mood and Affect: Mood normal.         Thought Content: Thought content normal.         RECENT LABS:    Lab Results   Component Value Date    WBC 5.82 10/27/2023    HGB 15.1 10/27/2023    HCT 46.6 10/27/2023    MCV 93.6 10/27/2023     10/27/2023     Lab Results   Component Value Date    GLUCOSE 98 10/27/2023    BUN 9 10/27/2023    CREATININE 0.75 10/27/2023    EGFR 100.8 10/27/2023    BCR 12.0 10/27/2023    K 4.5 10/27/2023    CO2 26.7 10/27/2023    CALCIUM 9.9 10/27/2023    ALBUMIN 3.9 10/27/2023    BILITOT 0.3 10/27/2023    AST 13 10/27/2023    ALT 12 10/27/2023     Lab Results   Component Value Date    CEA 1.58 10/27/2023                      IMAGING:  CT Chest With Contrast " Diagnostic, CT Abdomen Pelvis With Contrast  Narrative: CT CHEST, ABDOMEN, AND PELVIS WITH IV CONTRAST     HISTORY: Metastatic rectal cancer, follow-up     TECHNIQUE: Radiation dose reduction techniques were utilized, including  automated exposure control and exposure modulation based on body size.   3 mm images were obtained through the chest, abdomen, and pelvis with IV  contrast.      COMPARISON: Multiple CT chest, abdomen and pelvis dating back to  1/10/2023     FINDINGS:      Chest:      No hilar, mediastinal or axillary adenopathy by size criteria. Right  Port-A-Cath tip terminates in the superior cavoatrial junction. No  significant pericardial effusion. Prominent varices project through the  chest wall, as before. Multiple bilateral pulmonary nodules are present,  as before. Index nodule within the right upper lobe measures 0.9 x 0.8  cm (previously 0.7 x 0.7 cm on 7/7/2023). Index nodule within the left  upper lobe measures 0.8 x 0.9 cm (previously 0.5 x 0.6 cm on 4/11/2023  and 0.7 x 0.6 cm on 7/7/2023). Index lesion within the left upper lobe  more inferiorly (image 46) measures 0.6 x 0.8 cm (previously zero 0.5 x  0.5 cm on 7/7/2023).     No new pulmonary consolidation, pleural effusion or pneumothorax.  Scattered sclerotic lesions within the bilateral aspects of the pelvis  are present, as before.     Abdomen and pelvis:      The appendix is unremarkable. There are no findings of small bowel  obstruction. The degree of presacral soft tissue thickening measuring up  to approximately 2.3 cm on midline sagittal imaging measures up to  approximately 2.2 cm (previously 2.7 cm on 4/11/2023 and 2.4 cm on  7/7/2023). Gallbladder is surgically absent. The liver, pancreas, spleen  and adrenal glands have an unremarkable postcontrast CT appearance.  Subcentimeter renal lesions are too small to characterize. No  hydronephrosis. No abdominal pelvic adenopathy adenopathy by size  criteria. No free intraperitoneal air  is seen. Prominent varices present  within the abdominal wall.     Bone windows: No suspicious lytic or blastic osseous lesions. For the  purpose of this dictation, last well-formed space referred to as L5-S1.  Chronic compression deformity at T6, as before.     Impression: Impression:  1.  Bilateral pulmonary nodules likely representing metastatic disease  with index nodules which appear to have minimally increased in size  since 7/7/2023 as detailed above.  2.  No new CT findings of metastatic disease in the abdomen and pelvis.  Stable sclerotic lesions in the pelvis  3.  Continued improvement in the degree of presacral soft tissue  thickening over multiple prior CTs.  4.  Other findings as above.           This report was finalized on 10/30/2023 4:38 PM by Dr. Kyle Diego M.D on Workstation: [id number]         ASSESSMENT:   1.  Metastatic rectal cancer.   Initial rectal ultrasound examination reported T3N0 disease without lymphadenopathy.   CT scan of chest, abdomen and pelvis reported no lymphadenopathy in the abdomen, pelvis or chest. She does have small pulmonary nodule 6-7 mm and 2 mm with indeterminate feature. There is no solid evidence of metastatic disease otherwise.   Based on the CT scan and rectal ultrasound imaging studies, this patient had stage IIA (T3N0M0) disease.   She had PET scan examination on 8/8/2019 which reported a hypermetabolic right upper lobe pulmonary nodule 6 mm with SUV 5.6.  This is suspicious for metastatic disease however it is too small to be biopsied.  This patient may have stage IV disease.   She initiated concurrent radiation with continuous 5-FU on 8/12/2019.  Patient finished concurrent chemoradiation therapy.  Patient underwent surgical resection of the rectal tumor and diverting loop ileostomy on 12/2/2019 with Dr. Ye.  Pathology evaluation reported residual T3 disease, with 1 out of 14 lymph nodes positive for malignancy.  Certainly this patient has at least stage  IIIb rectal cancer (T3N1M0?)  On 1/22/2020 adjuvant chemotherapy FOLFOX 6 regimen initiated.   On 2/5/2022 cycle #2 was delayed secondary to neutropenia.  She was given 3 days of Granix.   Emend added with cycle 3.  With improved nausea control  Continuing home Granix x3 days following 5-FU disconnect  3/11/2020 due for cycle 4, however, she is experiencing progressive abdominal pain and occasional fevers.   CT scan performed on 3/13/2020 reveals no evidence of progressive or recurrent disease.  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  Patient hospitalized 4/24-4/26/20 after cycle 5 of chemotherapy with acute UTI.  CT abdomen/pelvis noting fluid collection in the presacral region having diminished in size compared to CT dated 3/13/2020.  Patient was evaluated by Dr. Ye while in the hospital with further plans to evaluate possible colovaginal fistula following completion of chemotherapy.  Patient did respond to IV antibiotics and discharged home on oral cefdinir.  4/29/2020 cycle 6 of FOLFOX.  Urinary symptoms have resolved   Patient seen in the [facility] ED on 5/2/2020 for what was suspected to be an allergic reaction.  She called our service on Saturday explaining that she was experiencing hand and face swelling.  She was instructed to proceed to the emergency department at which time she was assessed and prescribed a Medrol Dosepak.  She had just completed her 5-FU and was unhooked on Friday, 5/1/2020.  Her symptoms have resolved in the office on assessment, 5/5/2020.  The patient had grade 3 hand-foot syndrome based on symptomology.  Patient had cycle #8 of 5-FU on 5/13/2020. Due to her symptoms and poor tolerance to the 5-FU, her treatment dose will be reduced 50% for oxaliplatin and infusional 5-FU.  Bolus 5-FU will be discontinued..  On 5/21/2020 patient had a PET scan and it showed no evidence of of metastatic disease in the neck, chest, abdomen or pelvis.  There was fluid accumulation/abscess.   On  5/27/2020 discussed with the patient that we can discontinue chemotherapy at this time.  She will follow-up with Dr. Ye to discuss a possibility to reverse the ileostomy.  We likely will obtain anther PET scan in 3 to 4 months for reassessment.    Patient was seen by Dr. Ye on 6/19/2019 for evaluation and to discuss possible take down of her ileostomy.  She is scheduled to have a gastrografin enema on 7/2/2020 to evaluate for a fistula, and then a colonoscopy on 7/15/2020, both done by Dr. Ye.  She states that based on the above imaging and procedure findings, she may have a more extensive surgery done or just have her ileostomy reversed, which would likely be done in August 2020.  This will all be coordinated under the care of Dr. Ye.     CT scan from 09/09/2020 and also compared to her previous PET scan examination from 05/21/2020 and also the original PET scan from 08/09/2019.  The original PET scan there is a small right upper lobe pulmonary nodule 6 mm with SUV 5.6. So in the 05/2020 PET scan the nodule is still there but activity seems much less and this most recent CT scan examination also documented the preexisting small pulmonary nodule however there is a new left lower lobe pulmonary nodule 8 mm in size and I shared with the patient today this nodule is suspicious for metastatic disease. Laboratory studies reported minimal CEA level 1.32 on 09/09/2020. Liver function panel was only marginally abnormal with the ALT 45, the remaining is normal. However laboratory study today showed worsening AST 55, ALT 95 and alkaline phosphatase 143 but is still normal, total bilirubin 0.3.   Recommended to have repeat PET scan examination for assessment because the left lower lobe pulmonary nodule is too small to be biopsied, and if the PET scan reports hypermetabolic lesion, I recommended to have stereotactic radiation therapy. Explained to patient that she is not a really good candidate to have thoracotomy  for resection because she still has unhealed wound in the abdomen. Stereotactic radiation therapy will likely be a more feasible choice.   PET scan examination obtained on 9/18/2020 showed metastatic disease.  It reported a few hypermetabolic pulmonary nodules, and some new small pulmonary nodules, all of them highly suspicious for metastatic disease.  She also has hypermetabolic activity in the abdomen and pelvic area which could be related to scar tissue from her recent surgery versus metastatic disease.  Nevertheless overall picture fits with metastatic cancer.  Discussed with the patient on 9/20/2020, we reviewed the PET scan images together, and I recommended to have systematic chemotherapy, I do not think stereotactic reading therapy to the hypermetabolic lesions in the lungs warranted at this time because even those will be treated with reading therapy, she will still need systematic chemotherapy.  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  I did discuss with the patient using anti-EGFR monoclonal antibody versus anti-VEGF monoclonal antibody.     Palliative chemotherapy cycle#1 FOLFIRI was started on 10/13/2020.  No bolus 5-FU given considering previous poor tolerance.    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  Microsatellite stable, mutation burden 5/Mb.   Discussed with the patient 10/27/2020, because of the K-s[place]ia mutation, she is not a candidate for anti-EGFR monoclonal antibody such as Erbitux or vectibix.  She could be a candidate for anti-VEGF monoclonal antibody, however because of active wound, she is not a candidate right now at this moment.  Patient seen in the ED for acute right neck pain on 11/16/2020.  CT angiogram as well as CT of the cervical spine performed with no acute findings but notably one of her pulmonary nodules, left upper lobe, somewhat decreased in size.  Patient given prednisone pack.  Further details  below.  Cycle #6 chemotherapy will be resumed on 1/5/2021.  Started back on original irinotecan.  PET scan examination obtained on 1/12/2021 after cycle #6 chemotherapy reported improved pulmonary nodule hypermetabolic activity.  Confirmed these are metastatic lesions.  Patient is evaluated 1/19/2020, will continue cycle #7 FOLFIRI chemotherapy.  However Avastin will be on hold see next section.   Patient was reevaluated on 2/2/2021, will continue chemotherapy cycle #8 FOLFIRI.  Avastin / biosimilar will be added.    [facility] in mid February 2021, and had recommended palliative chemotherapy without surgical intervention of metastatic lung lesions.   On 3/2/2021, patient will proceed with cycle #10 FOLFIRI plus bevacizumab.  After 12 cycles, plan to start maintenance treatment.  3/16/2021 cycle 11.  Plus bevacizumab.  3/30/2021 cycle 12 FOLFIRI plus bevacizumab.  Having issues with worsening nausea despite premeds with dexamethasone, Aloxi, Emend, and Zofran at home.  Patient is requesting a dose of Phenergan, which is helped her in the past.  We will give this to her with treatment today.  PET scan examination on 4/6/2021 reported no evidence of hypermetabolic metastatic lesion.  Discussed with the patient on 4/13/2021, we reviewed the PET scan results.  We recommended to switch to maintenance chemotherapy without irinotecan.  We will continue 5-FU and Avastin every 2 weeks.  We discussed possibility of switching to oral Xeloda treatment.  Discussed with the patient for side effects more so with hand-foot syndrome.  Patient is agreeable.   Xeloda 2000 mg twice daily 7 days on, 7 days off along with Avastin initiated 4/27/2021.  After only 5 doses of Xeloda significant hand-foot syndrome, Xeloda held. Patient requesting to be transitioned back to infusional 5-FU, as she felt that she tolerated infusional 5-FU without any problem.  The patient will receive 5-FU  leucovorin and Avastin every 2 weeks.  Xeloda discontinued.  5/25/2021 continued cycle #4 maintenance 5-FU, leucovorin, Avastin tolerating this much better so far, we will continue this. Recommended to have 12 cycles of maintenance chemotherapy, then obtain PET scan for reevaluation.  We could discuss further treatment plan at that time.  9/14/2021 patient due for cycle 12 of additional maintenance 5-FU/leucovorin plus Avastin.  She continues to tolerate treatment well overall.  She is anxious in the office today with her Mediport not getting blood at first and also with upcoming scans being heavy on her mind.  She was instructed to take one of her Klonopin pills while here to help calm her mind.  Heart rate did improve from 140s down to 112.  Proceed with treatment today as scheduled.  PET scan examination on 9/24/2021 reported further shrinking of the right upper lobe tiny pulmonary nodule.  No other new lesions.  Discussed with patient on 9/24/2021 about further shrinking of the right upper lobe pulmonary nodule and no evidence for other new lesions. We recommended to have chemo break for 3 months with repeated CT scan for reevaluation.  Recent CT scan for chest 12/14/2021 and abdomen CT from 11/29/2021 reported no evidence for active disease. The right upper lobe pulmonary nodule, left lower lobe pulmonary nodule minimal about 4 mm and stable. I discussed with patient today on 12/21/2021, recommended to give her another 3 months chemotherapy holiday and obtain CT scan afterwards for reevaluation. After discussion, patient is agreeable.   CT scan examination for chest abdomen and pelvis obtained on 3/15/2022 reported a slight increase of the left upper lobe pulmonary nodule 5 mm, from previous 3 mm.  The other pulmonary nodules were stable in size.  There is also small left upper lobe groundglass changes.   I reviewed images studies with the patient today on 3/23/2022, compared to multiple previous images  including CT chest CT and PET scan, suspected disease progression.  Discussed with the patient, and I recommended to resume maintenance chemotherapy with 5-FU plus leucovorin plus Avastin repeating every 2 weeks, and obtaining CT scan for reassessment in 3 months.  Patient is agreeable.  Patient resumed maintenance chemotherapy on 3/29/2022 with 5-FU leucovorin and Avastin.   4/26/2022, proceed with cycle #27 maintenance 5-FU, leucovorin, and Avastin.  Patient continues to tolerate treatment well.    On 5/10/2022, we will proceed ahead with cycle #28 maintenance chemotherapy.  Plan to have CT scan after cycle #30.  5/24/2022 cycle 29 maintenance chemotherapy.  Continue to tolerate well.  6/7/2022 cycle #30 maintenance chemotherapy, continuing to tolerate well.   CT scan for chest abdomen pelvis with IV contrast obtained on 6/21/2022 reported sub-6 mm pulmonary nodules either stable or slightly smaller.  We reviewed the images with the patient together.  We discussed with the patient and recommended to hold chemotherapy for now with repeating CT scan in 3 months for reassessment.  CT scan of the chest abdomen pelvis 9/13/2022 reported stable condition, no evidence for recurrent or metastatic colon cancer.  On 1/10/2023 patient had a CT chest abdomen pelvis with reported no evidence for disease progression.  Laboratory study also showed normal CEA.   Patient had a CT scan for chest, abdomen, and pelvis obtained on 04/11/2023. This study reported very small pulmonary nodules, the right upper lobe nodule is stable to 6 mm, however, the left upper lobe and the left lower lobe nodule increased to 5 mm from previous 4 mm. I discussed with the patient today on 04/19/2023, I recommend to obtain another CT scan in 3 months for reassessment. The nodules are so small, they likely will not be picked up by PET scan examination.  CT scan examination of the chest, abdomen, and pelvis obtained on 7/7/2023 reported stable small  pulmonary nodules. No evidence for disease progression or new lesions in chest, abdomen, and pelvis.  Discussed with patient today on 7/14/2023, however, patient is concerning for disease progression. I recommended to obtain [employer] Reveal for circulating tumor DNA study. If the study is positive, we will further obtain PET scan examination, otherwise, we will obtain CT scan for chest, abdomen, and pelvis in 3 months for reassessment.  The patient had CT scan for the chest, abdomen, and pelvis obtained on 10/27/2023, which reported worsening pulmonary nodules at 3 different spots. Laboratory studies showed a low normal CEA level and a normal liver function panel. The [employer] Reveal study is still pending. I discussed this with the patient today 11/03/2023 and we shared the images, and I recommended having a PET scan examination for further assessment. I will present her case at the multimodality lung conference. If those lesions are deemed to be metastatic lesions, I recommend having stereotactic radiotherapy. I discussed it with the patient, and she voiced understanding and was agreeable with that strategy.      2.   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  Low-dose Lovenox discontinued and initiated Xarelto 20 mg daily.  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  Patient held Xarelto for 2 days prior to MediPort removal from the left chest wall and placement of new port in the right chest wall on 12/18/2020.  She  resumed Xarelto that evening.  Progressive swelling in the neck, face, upper extremities prompting hospitalization with CT scan showing thrombosis in the right subclavian vein and SVC.  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  Although she had been off of Xarelto for a few days, clearly Xarelto was not prohibiting progression of her thrombosis after she resumed treatment and there was some evidence to suggest thrombosis had been present at least in the innominate vein on prior scan in September but now appears chronic.  Current acute thrombosis involving SVC and right subclavian.  Patient was admitted and placed on heparin drip  Patient was evaluated by vascular surgery and intervention was not recommended for thrombolysis/thrombectomy.  On 12/22/2020, the patient developed worsening shortness of breath, increasing upper extremity edema consistent with worsening SVC syndrome.  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  Symptoms improved and patient was discharged 12/24/2020 on Lovenox 100 mg every 12 hours.    Returns 12/29/2020 for evaluation continuing Lovenox, overall tolerating it well.  No bleeding issues.  Improved edema 1/5/2021.  Will continue Lovenox weight-based every 12 hours.  On 1/19/2021 and 2/2/2021, patient reports improved facial swelling.  She however does have being bruise on her abdomen wall where she does Lovenox injection.  However she does not have a spontaneous epistaxis, gum bleeding or other bleeding.   On 2/2/2021, we discussed that side effects of Avastin/biosimilar related to thrombosis.  Since this patient already has been on Lovenox, I think the benefits from treatment for her cancer will outweigh that risk of thrombosis, will proceed ahead with Avastin.  I cautioned patient to watch out  for signs of worsening thrombosis patient voiced understanding.   On 3/16/2021, no evidence of worsening DVT, continue Lovenox.  5/11/2021 Lovenox dosing will be adjusted due to patient's weight loss.  We discussed she needs 1 mg/kg.  Her syringes are 100 mg syringes she will do 0.9 mL subcu every 12 hours.  8/3/2021 Patient continues to have bruising on abdomen from lovenox injections.  Will need to monitor weight and adjust dosing in future if further weight loss.   Stable condition on 9/28/2021.  Continue Lovenox anticoagulation.    Patient reports no chest pain no dyspnea no leg edema. However she had bruising and also most recently abnormal small abscess for which she actually went to ER on 11/29/2021, had I&D. The wound is healing. No further drainage. Denies fever sweating or chills. After discussion, she will continue Lovenox injection for now.   On 3/23/2022, patient reports good tolerance of Lovenox.  Nevertheless she still has small quantity of pus in the left lower abdomen abscess, she squeezes it every day to prevent it became worse.  She reports no fever sweating chills.  Recommended to start Augmentin 875 mg twice a day for 7 days.  She will continue Lovenox indefinitely.  On 4/12/2022, patient reports resolution of the small abscess at left lower abdominal wall.   On 5/10/2022, patient continues on Lovenox indefinitely.  No easy bleeding or bruising.  6/23/2022 continuing on Lovenox 1 mg/kg at a dose of 100 mg (patient weight is 96.6 kg today) every 12 hours.  On 1/17/2023, patient reports good tolerance, Lovenox will be continued.    Patient had a venous Doppler study on 02/05/2023, which reported acute left upper extremity DVT in the subclavian vein, axillary and the brachial vein, and also superficial thrombophlebitis in the basilic upper arm.   On 04/19/2023, patient reports that she was not compliant with anticoagulation at the time of recurrent DVT. She now is fully compliant and is using  Lovenox.  Today there is no evidence of recurrent thrombosis. Patient will continue Lovenox anticoagulation.    3.  This patient also has strong family history for malignancy   The patient had appointment with genetic counseling on September 3, 2019.  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    4.  Mild anemia.   She also has history of iron deficiency.  Iron studies reveal iron saturation of 10%.  She was started on oral iron but was unable to tolerate due to GI side effects.   Status post Injectafer 2/5/2020 and 2/12/2020   Improved hemoglobin 13.5 on 1/5/2021.  3/16/2020 when hemoglobin now up to 16.2, hematocrit 47.6.  Patient admits that she has started smoking again, and I have encouraged her to quit.  She denies any snoring or sleep apnea diagnosis.  Patient is not taking oral iron.  We will closely monitor.  3/30/2020 hemoglobin 15.7, HCT 47.5.  Patient states that she has cut back significantly on her smoking, although not quit yet.  I have encouraged her to continue working on this.  We will continue to closely monitor.  Hemoglobin 14.6 on 6/8/2021 at the meantime he has worsening macrocytosis .6.    Laboratory studies 6/22/2021 reported excellent folate > 20 ng/mL, however low normal B12 level 357 pg/mL.    On 7/6/2021, normal hemoglobin 15.8, .9 and HCT 47.2%.  Patient was asked to start oral vitamin B12 at 1000 mcg daily.   9/14/2021 hemoglobin 17.0, hematocrit 52.1.  Monitor.  On 9/28/2021 hemoglobin 16.1 hematocrit 48.5%, continue to monitor.   Lab study on 12/14/2021 reported excellent ferritin 296 and iron saturation 25% with free iron 104 TIBC 424. Hemoglobin was 15.2 with .2.   Normal hemoglobin 14.6 on 3/15/2022.  Iron study reported normal ferritin 129.5 ng/mL however slightly low iron saturation 11%.  Continue to monitor for now.  4/26/2022, hemoglobin 14.8.  6/7/2022 hemoglobin 15.5.  On 9/20/2022 Hb 15.7, .1.  On 1/10/2023 normal hemoglobin 14.7 MCV  95.0.  Laboratory study on 04/11/2023 reported normal hemoglobin 13.9.  Lab study on 7/7/2023 reported normal hemoglobin 14.9.  A laboratory study on 10/27/2023 reported normal hemoglobin at 15.1.    5.  Peripheral neuropathy secondary to chemotherapy.   This is mild involving bilateral hands and feet as reported on 9/16/2020.   Will avoid chemotherapy with oxaliplatin.  Stable mild neuropathy as of 11/10/2020  Patient reports worsening peripheral neuropathy and cycle #5 chemotherapy on 12/8/2020 with and without irinotecan.   On 1/5/2021, patient reports improvement of peripheral neuropathy, will add irinotecan back to chemotherapy.  Continue to monitor and adjust medication.  On 3/2/2021, patient reports no worsening of peripheral neuropathy after restarting irinotecan.   This remains stable, may be slightly better as reported on 3/23/2022..   No worsening since resuming chemotherapy on 3/29/2022.  On 6/23/2022 peripheral neuropathy remains stable.  No worsening peripheral neuropathy today.     6.  History of hand-foot syndrome grade 3.    It become so significant after cycle #7 FOLFOX treatment.  Discussed with patient on 5/13/2020, will discontinue the bolus 5-FU dose, and also decrease 50% of the infusion dose through the pump.   We also use Medrol Dosepak for possible recurrent symptomology.  She responded this well.   Her hand-foot syndrome improved.  Under current treatment with FOLFIRI, patient not receiving 5-FU bolus.  No recurrent issues.   Patient will be switched to maintenance chemotherapy using Xeloda in late April 2021.  Following 5 doses of Xeloda, significant hand-foot syndrome with pain in the feet, significant erythema.  Xeloda held.  Will be further discussed with Dr. Nguyen to determine if the patient will resume 5-FU versus a dose reduction to Xeloda.  Aggressive emollient moisturizer use twice daily for the past week and patient reports improvement in the dryness and erythema.  Xeloda will  now be discontinued and patient will switch back to 5-FU.  As of 5/25/2021 dryness and erythema/hyperpigmentation continues to improve.  Xeloda has been discontinued.  6/8/2021, patient reports much improved hand-foot syndrome, with remnant pigmentation on palms.  On 7/6/2021, patient reports and had a some flareup of hand-foot syndrome, however improved after aggressive moisturizing cream and the urea cream.  Overall much tolerated infusional 5-FU compared to Xeloda.    7/20/2021 continues to have mild hyperpigmentation of her hands and feet bilaterally, she continues aggressive moisturization with utter balm with urea.  She also reports some soreness of her tailbone, and we discussed that this is likely due to loss of weight and muscle mass.  I advised her to go ahead and apply moisturizer to this area as there is one tiny fissure.  Also recommended using a waffle pad or doughnut to sit on.  8/3/2021 patient reports her hand foot symptoms are stable and without worsening.  Continue to monitor.  Symptoms stable, typically flaring about 24 hours after she is unhooked from her 5-FU infusional ball and then settling down with some mild peeling.   Since off chemotherapy no specific complaints.   No recurrent symptoms after resuming chemotherapy on 3/29/2022.  5/24/2022 does report some mild symptoms about 24 hours after disconnect maintenance chemotherapy.  She is using utterly smooth twice a day.  On 9/20/2022 patient reports mild peripheral neuropathy.  Stable condition today..      7.  Depression.  Patient seen by JULIET Davenprot on 11/9/2020.  She was started on mirtazapine.  Lexapro was discontinued.  This has definitely improved her mood with the patient feeling overall much better.  She is sleeping better.  Appetite is improved with her actually eating more than she wants to.    Condition is stable.  She plans to continue follow-up with supportive oncology.    8. Malfunction of the portal catheter  Patient  reports there was no blood drawn from the portal catheter. CT scan of the chest on 7/7/2023 reported occlusion of the SVC around to the Port-A-Cath tubing. I recommend trying activase to see if it helps.  Otherwise, we may have to remove the catheter. Clinically, patient has no signs of SVC syndrome.  On 11/03/2023, the patient reports that her Port-A-Cath was able to be used for a CT scan for the injection of intravenous dye; however, there was no blood return, so we needed to draw from her arm for the blood test. We will continue to keep Port-A-Cath for now.      PLAN:    1. Arrange a PET scan as soon as possible for further evaluation of progressive disease, especially enlarging pulmonary nodules.  2. Pending results for [employer] [employer].  3. I will present her case at the multimodality lung conference either this coming week or the week following, depending on the date of her PET scan examination.  4. The patient will continue self-injection of Lovenox at 1 mg/kg every 12 hours.  5. I will bring the patient back for reevaluation in 2 weeks, for further evaluation.  6. The patient will continue to take oral vitamin B12 daily.      I spent 43 minutes caring for Carole on this date of service. This time includes time spent by me in the following activities: preparing for the visit, reviewing tests, obtaining and/or reviewing a separately obtained history, performing a medically appropriate examination and/or evaluation, counseling and educating the patient/family/caregiver, ordering medications, tests, or procedures, referring and communicating with other health care professionals, documenting information in the medical record, independently interpreting results and communicating that information with the patient/family/caregiver and care coordination            Dong Nguyen MD PhD    Transcribed from ambient dictation for Dong Nguyen MD PhD by Mik Joseph.  11/03/23   16:35 EDT    Patient or patient  representative verbalized consent to the visit recording.  I have personally performed the services described in this document as transcribed by the above individual, and it is both accurate and complete.      Dong Nguyen MD PhD        CC:  Deepika Vela III NP-C   Padmaja Ye MD

## 2023-11-06 ENCOUNTER — TELEPHONE (OUTPATIENT)
Dept: INTERNAL MEDICINE | Facility: CLINIC | Age: 43
End: 2023-11-06
Payer: COMMERCIAL

## 2023-11-06 DIAGNOSIS — Z92.21 HISTORY OF CHEMOTHERAPY: Primary | ICD-10-CM

## 2023-11-06 NOTE — TELEPHONE ENCOUNTER
----- Message from Carole Weaver sent at 2023 11:01 AM EST -----  Regarding: Bone density test  Contact: [phone]  Ricky Bhatt-  I just tried to schedule my bone density test and the referral has  and they need a new one. Would you mind to order me that test again, please?    Carole

## 2023-11-08 ENCOUNTER — HOSPITAL ENCOUNTER (OUTPATIENT)
Dept: PET IMAGING | Facility: HOSPITAL | Age: 43
Discharge: HOME | End: 2023-11-08
Payer: COMMERCIAL

## 2023-11-08 DIAGNOSIS — C78.02 MALIGNANT NEOPLASM METASTATIC TO BOTH LUNGS: ICD-10-CM

## 2023-11-08 DIAGNOSIS — R91.8 MULTIPLE PULMONARY NODULES: ICD-10-CM

## 2023-11-08 DIAGNOSIS — C78.01 MALIGNANT NEOPLASM METASTATIC TO BOTH LUNGS: ICD-10-CM

## 2023-11-08 DIAGNOSIS — R93.89 ABNORMAL FINDING ON IMAGING: ICD-10-CM

## 2023-11-08 DIAGNOSIS — C20 ADENOCARCINOMA OF RECTUM: ICD-10-CM

## 2023-11-08 LAB — GLUCOSE BLDC GLUCOMTR-MCNC: 116 MG/DL (ref 70–130)

## 2023-11-08 PROCEDURE — 78815 PET IMAGE W/CT SKULL-THIGH: CPT

## 2023-11-08 PROCEDURE — 82948 REAGENT STRIP/BLOOD GLUCOSE: CPT

## 2023-11-08 PROCEDURE — A9552 F18 FDG: HCPCS | Performed by: INTERNAL MEDICINE

## 2023-11-08 PROCEDURE — 0 FLUDEOXYGLUCOSE F18 SOLUTION: Performed by: INTERNAL MEDICINE

## 2023-11-08 RX ADMIN — FLUDEOXYGLUCOSE F 18 1 DOSE: 200 INJECTION, SOLUTION INTRAVENOUS at 07:58

## 2023-11-10 ENCOUNTER — TELEPHONE (OUTPATIENT)
Dept: SURGERY | Facility: CLINIC | Age: 43
End: 2023-11-10
Payer: COMMERCIAL

## 2023-11-17 ENCOUNTER — OFFICE VISIT (OUTPATIENT)
Dept: ONCOLOGY | Facility: CLINIC | Age: 43
End: 2023-11-17
Payer: COMMERCIAL

## 2023-11-17 VITALS
HEART RATE: 75 BPM | HEIGHT: 66 IN | SYSTOLIC BLOOD PRESSURE: 102 MMHG | RESPIRATION RATE: 18 BRPM | BODY MASS INDEX: 31.4 KG/M2 | WEIGHT: 195.4 LBS | DIASTOLIC BLOOD PRESSURE: 72 MMHG | OXYGEN SATURATION: 98 % | TEMPERATURE: 98.6 F

## 2023-11-17 DIAGNOSIS — Z86.711 HISTORY OF PULMONARY EMBOLISM: ICD-10-CM

## 2023-11-17 DIAGNOSIS — D68.51 HETEROZYGOUS FACTOR V LEIDEN MUTATION: Chronic | ICD-10-CM

## 2023-11-17 DIAGNOSIS — R91.8 MULTIPLE PULMONARY NODULES: ICD-10-CM

## 2023-11-17 DIAGNOSIS — C78.01 MALIGNANT NEOPLASM METASTATIC TO BOTH LUNGS: ICD-10-CM

## 2023-11-17 DIAGNOSIS — C20 ADENOCARCINOMA OF RECTUM: Primary | ICD-10-CM

## 2023-11-17 DIAGNOSIS — R91.8 PULMONARY NODULES: ICD-10-CM

## 2023-11-17 DIAGNOSIS — C78.02 MALIGNANT NEOPLASM METASTATIC TO BOTH LUNGS: ICD-10-CM

## 2023-11-17 DIAGNOSIS — Z79.01 ON ANTICOAGULANT THERAPY: ICD-10-CM

## 2023-11-17 LAB — REF LAB TEST METHOD: NORMAL

## 2023-11-17 NOTE — PROGRESS NOTES
REASON FOR FOLLOW UP:     Rectal cancer, rectal ultrasound examination in July 2019 reported T3N0 disease without lymphadenopathy. She does have small pulmonary nodule 6-7 mm and 2 mm with indeterminate feature. There is no solid evidence of metastatic disease otherwise. Patient has stage IIA (T3N0M0) disease.  The patient is heterozygous factor V Leiden, was on prophylactic anticoagulation with Lovenox 40 mg daily given her increased risk of thrombosis with MediPort and GI malignancy.   PET scan on 8/8/2019 reported an 8 mm hypermetabolic right upper lobe pulmonary nodule, which is suspicious for metastatic as well.    Patient had a PowerPort placement on the left upper chest by Dr. Joseph on 8/9/2019.  Patient was started on concurrent infusional 5-FU chemoradiation therapy on 8/12/2019, with planned complete surgical resection and further adjuvant chemotherapy with intention to cure the disease.   Patient underwent surgical resection of the primary rectal cancer by Dr. Ye on 12/2/2019.  Pathology evaluation reported residual T3N1 disease stage IIIb.  Diarrhea related to therapy and radiation.   Patient was started cycle 1 day 1 of adjuvant FOLFOX 6 on 1/23/2020.  On 2/5/2020 FOLFOX 6 cycle 2 delayed secondary to neutropenia.  Patient was given 3 days of Granix injection.  After cycle #2 we planned 3 days of Granix with each cycle.   Patient also intolerant of oral iron.  Patient received 2 doses of IV injectafer on 02/05/2020 and 02/12/2020.   02/12/2020 Proceed with cycle #2 of FOLFOX 6 with 3 days of Granix.  On 3/11/2020 cycle 4 postponed secondary to abdominal pain and occasional low-grade fevers.  CT scans ordered.  Cycle #4 resumed after CT scan revealed no evidence of disease.  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 Ye. patient has no fever.  Continue to observe.   Grade 3 hand-foot syndrome secondary to 5-FU after cycle #7  FOLFOX 6 chemotherapy, prompting ER visit.  Also has worsening peripheral neuropathy.   Cycle #8 FOLFOX 6 was given on 5/13/2020, with 50% dose reduction for both 5-FU and oxaliplatin, and also examination of bolus 5-FU.   PET scan examination on 5/21/2020 reported no evidence of metastatic disease in the chest abdomen pelvis.  Stable 6 mm RUL pulmonary nodule.  On 5/27/2020, I discussed with the patient that we can discontinue chemotherapy at this time.   Patient had a surgical procedure for low anterior colon resection, coloanal anastomosis on 8/3/2020.  CT scan for chest abdomen pelvis on 9/9/2020 reported a new 8 mm noncalcified pulmonary nodule in the left lower lobe of the lung.  Otherwise stable right upper lobe 6 mm pulmonary nodule, and no evidence of disease recurrence in the abdomen or pelvis.  PET scan examination on 9/18/2020 reported multiple hypermetabolic small pulmonary nodules/ new pulmonary nodules.   Patient was started on pelvic chemotherapy with FOLFIRI regimen on 10/13/2020.   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 It reported tumor mutation burden 5 Muts/Mb, microsatellite stable, TP53 mutation R282W, and others.   Cycle #5 was without irinotecan, due to peripheral neuropathy.  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  Patient was switched to weight-based Lovenox injection.  Cycle #6 5-FU and irinotecan was delayed by 2 weeks because of the above incident.  Patient had a telemedicine evaluation at that the [facility] [place] in February 2021.  They agreed with our treatment plan for palliative chemotherapy followed by maintenance chemotherapy.    PET scan examination on 4/6/2021 after cycle #12 palliative chemotherapy reported no evidence of hypermetabolic metastatic lesion.   Patient was started on maintenance chemotherapy with 5-FU and  Avastin on 4/13/2021. (Unable to tolerate Xeloda because of a significant hand-foot syndrome).   PET scan on 9/24/2021 obtained after cycle #12 maintenance chemotherapy with 5-FU, leucovorin, Avastin reported no evidence for active disease. We recommended 3 months chemotherapy holiday.  CT scan examination on 3/15/2022 reported slightly disease progression with increase in size of small pulmonary nodule.  We started patient back on maintenance chemotherapy on 3/29/2022 treatment with 5-FU plus leucovorin and Avastin, repeating every 2 weeks.  After 6 more maintenance chemotherapy, CT scan for chest abdomen pelvis was done on 6/21/2022 which reported stable sub-6 mm pulmonary nodules.  Patient had last maintenance chemotherapy on 6/7/2022.       HISTORY OF PRESENT ILLNESS:  The patient is a 43 y.o. female with the above-mentioned issue who presented on 11/03/2023 for evaluation after recent CT scan of the chest, abdomen, and pelvis obtained on 10/27/2023.     The CT scan for the chest reported enlarging pulmonary nodules bilaterally. The right upper lobe lung nodule increased from 7 x 7 mm to 9 x 8 mm, and the left upper lobe nodule measured 8 x 9 mm from 5 x 6 mm in 04/2023. There is a different left upper lobe nodule 6 x 8 mm from previous 5 x 5 mm. The presacral tissue thickness measures up to 2.3 cm.    A laboratory study on 10/27/2023 reported CEA 1.58 ng/mL, normal CBC, and CMP.  Molecular study of peripheral blood Guardant Reveal is still pending.    INTERVAL HISTORY:    The patient presents today on 11/17/2023 for reevaluation to discuss the results of PET scan examination as well as the results of tumor conference. I saw her recently on 11/03/2023 and because of enlarging pulmonary nodules on the CT scan, we requested a PET scan examination. I presented her case yesterday on 11/16/2023 at the [facility].    The patient reports she is at her baseline condition as 2 weeks ago. No specific  complaints, no chest pain, dyspnea, cough, etc. She has a regular bowel movement.    Her case was present today at the [facility]. on 11/16/2023. The PET scan images and chest CT scan were reviewed in conjunction with her treatment history. The consensus was for patient to receive stereotactic radiation therapy for the right upper lobe lung lesion, and the bigger left upper lobe lesion, and monitor the smaller left upper lobe lesion with further images studies down the line. Also, the conference recommended Guardant Reveal study which we already ordered.     This Guardant Reveal study came back today as negative for ctDNA 0%.          Past Medical History:   Diagnosis Date    Abdominopelvic abscess 08/12/2020    ADMITTED TO [facility]    Abdominopelvic abscess 08/12/2020    Abnormal Pap smear of cervix 02/02/1998    JULIUS I    Abscess of abdominal wall 11/28/2012    SEEN AT [facility] ER    Anemia in pregnancy     Anxiety     Bilateral epiphora 04/2020    Bilateral hand swelling 05/02/2020    SEEN AT [facility] ER    Cervical lymphadenitis 06/06/2012    SEEN AT [facility] ER    Chemotherapy induced diarrhea 01/2021    Chemotherapy induced neutropenia 04/2020    Chemotherapy-induced nausea 02/2020    Chemotherapy-induced thrombocytopenia 05/2020    Chronic anticoagulation     Chronic diarrhea     Colon polyps     FOLLOWED BY DR. 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 ALEXANDRU HUNT    History of gestational diabetes     History of pre-eclampsia     History of pre-eclampsia     History of radiation therapy     FOLLOWED BY DR. JAVON LEWIS    History of TB skin testing 2009    TB Skin Test    History of TB skin testing 2009    TB Skin Test    History of transfusion 2019    2019    HPV in female     Hypokalemia 2019    Hypomagnesemia 2019    Hyponatremia 2021    IBS (irritable bowel syndrome)     IBS (irritable bowel syndrome)     Ileostomy in place     FOLLOWED BY DR. GERONIMO ESPARZA    Ileostomy present 2020    Take down on 11/3/2022    Infected insect bite of neck 2016    SEEN AT [facility]    Kidney stones 2007    SEEN AT [facility] ER    Low anterior resection syndrome 2022    FOLLOWED BY DR. GERONIMO ESPARZA    Lump of right breast     SWOLLEN LYMPH NODE    Lung cancer 2020    METASTATIC LUNG CANCER    Macrocytosis 2021    Monopolar depression     On anticoagulant therapy     Pain associated with surgical procedure 2020    Palmar-plantar erythrodysesthesia 2021    Perirectal abscess 2020    Pulmonary embolism without acute cor pulmonale 09/20/2019    X 3; ADMITTED TO [facility]    Pulmonary nodule, right 2020    Rectal cancer 2019    STAGE IIA, INVASIVE MODERATELY DIFFERENTIATED ADENOCARCINOMA, CHEMO AND XRT FINISHED 2019    Right shoulder pain 2020    SEEN AT [facility] ER    Right ureteral stone 10/01/2019    SEEN AT [facility] ER    SAB (spontaneous ) 1996     A2-1 INDUCED    Sciatica     Sepsis due to cellulitis 2002    LEFT GREAT TOE, ADMITTED TO [facility]    Sepsis due to cellulitis 2002    LEFT GREAT TOE, ADMITTED TO [facility]    Tachycardia 2020    Thrombosis of superior vena cava 2020    AND BRACHIOCEPHALIC VEIN, ADMITTED TO [facility]    Urinary urgency 2020   Patient had COVID-19 infection diagnosed on 2022 despite was fully vaccinated and boosted.  She  recovered without problem.      Past Surgical History:   Procedure Laterality Date    ABDOMINAL SURGERY      CHOLECYSTECTOMY N/A 10/10/2003    LAPAROSCOPIC WITH CHOLANGIOGRAM, DR. JAMEY TALAVERA AT [facility]    COLON RESECTION N/A 12/02/2019    Procedure: laparoscopic low anterior colon resection with mobilization of splenic flexure and diverting loop ileostomy: ERAS;  Surgeon: Padmaja Esparza MD;  Location: [facility] OR;  Service: General    COLON RESECTION N/A 08/03/2020    Procedure: LOW ANTERIOR COLON RESECTION, COLOANAL ANASTOMOSIS, MOBILIZATION SPLENIC FLEXURE;  Surgeon: Padmaja Esparza MD;  Location: [facility] MAIN OR;  Service: General;  Laterality: N/A;    COLONOSCOPY N/A 07/15/2020    PATENT ANASTAMOSIS IN MID RECTUM, RESCOPE IN 1 YR, DR. PADMAJA ESPARZA AT [facility]    COLONOSCOPY N/A 11/03/2022    DIFFUSE AREA OF MODERATELY FRIABLE MUCOSA IN ENTIRE COLON , DIVERSION COLITIS, PATENT AND HEALTHY ANASTAMOSIS, DR. PADMAJA ESPARZA AT [facility]    COLONOSCOPY W/ POLYPECTOMY N/A 07/08/2019    15 MM TUBULOVILLOUS ADENOMA POLYP IN HEPATIC FLEXURE, 20 MMTUBULOVILLOUS ADENOMA WITH HIGH GRADE DYSPLASIA IN RECTOSIGMOID, 4 CM MASS IN MID RECTUM, PATH: INVASIVE MODERATELY DIFFERENTIATED ADENOCARCINOMA, DR. JENNIFER LI AT [facility]    DILATATION AND EVACUATION N/A 2009    ENDOSCOPY N/A 07/08/2019    LA GRADE A ESOPHAGITIS, GASTRITIS, ALL BIOPSIES BENIGN, DR. JENNIFER LI AT [facility]    ILEOSTOMY CLOSURE N/A 11/14/2022    Procedure: ILEOSTOMY TAKEDOWN;  Surgeon: Padmaja Esparza MD;  Location: [facility];  Service: General;  Laterality: N/A;    INCISION AND DRAINAGE PERIRECTAL ABSCESS N/A 08/14/2020    Procedure: INCISION AND DRAINAGE OF retrorectal dehiscence abcess with drain placement and irrigation;  Surgeon: Padmaja Esparza MD;  Location: [facility];  Service: General;  Laterality: N/A;    PAP SMEAR N/A 01/24/2014    SIGMOIDOSCOPY N/A 07/24/2019    INFILTRATIVE PARTIALLY OBSTRUCTING LARGE RECTAL CANCER,  AREA TATOOED, DR. GERONIMO YE AT [facility]    SIGMOIDOSCOPY N/A 11/23/2019    AN ULCERATED PARTIALLY OBSTRUCTING MASS IN MID RECTUM, AREA TATTOOED, DR. GERONIMO YE AT [facility]    SIGMOIDOSCOPY N/A 07/22/2021    PATENT ANASTAMOSIS IN DISTAL RECTUM, RESCOPE IN 1 YR, DR. GERONIMO YE AT [facility]    TRANSRECTAL ULTRASOUND N/A 07/24/2019    Procedure: ULTRASOUND TRANSRECTAL;  Surgeon: Geronimo Ye MD;  Location: [facility] ENDOSCOPY;  Service: General    URETEROSCOPY LASER LITHOTRIPSY WITH STENT INSERTION Right 10/30/2019    DR. ESTUARDO BEASLEY AT [facility]    VAGINAL DELIVERY N/A 09/18/1998    VENOUS ACCESS DEVICE (PORT) INSERTION Left 08/09/2019    Procedure: INSERTION VENOUS ACCESS DEVICE;  Surgeon: Sj Joseph MD;  Location: [facility] OR [facility];  Service: General    VENOUS ACCESS DEVICE (PORT) INSERTION N/A 12/18/2020    Procedure: INSERTION VENOUS ACCESS DEVICE right side, removal venous access device left side;  Surgeon: Sj Joseph MD;  Location: [facility] OR [facility];  Service: General;  Laterality: N/A;    WISDOM TOOTH EXTRACTION Bilateral 1993       HEMATOLOGIC/ONCOLOGIC HISTORY:      The patient reports she has intermittent rectal bleeding and urgency, mucous with her stool, starting sometime in 2016. At that time she was referred to GI service, and was diagnosed of irritable bowel syndrome. Nevertheless she had worsening urgency for bowel movement, and had a couple of incidents losing control of stool. She was recently seen by Roland Thorpe MD again and had colonoscopy and EGD exam on 07/08/2019. She was found having a circumferential rectal mass. According to the procedure note, the patient had a fungating circumferential bleeding 4 cm mass in the middle rectum, at distance between 13 cm and 17 cm from the anus. Mass was causing partial obstruction. There were also colon polyps found at the hepatic flexure and also at the rectosigmoid junction 23 cm from the anus. Both were resected and retrieved. EGD examination reported  grade A esophagitis at the GE junction and patchy discontinuous erythema and aggravation of the mucosa without bleeding in the stomach body. There is normal mucosa of the duodenum. Pathology evaluation from this procedure reported moderately differentiated adenocarcinoma involving the rectal mass. The rectal sigmoid junction polyp was tubular/tubulovillous adenoma with high grade dysplasia negative for invasive malignancy. The hepatic flexure polyp was also tubular/tubulovillous adenoma negative for high grade dysplasia or malignancy. The biopsy from the esophagus reports squamocolumnar mucosa with inflammatory changes suggestive of mild reflux esophagitis, negative for interstitial metaplasia. Gastric biopsy was benign and duodenal biopsy was also benign. There is no mention of H-pylori.     Patient was subsequently referred to colorectal surgeon Padmaja Ye MD for further evaluation. The patient had CT scan examination for chest, abdomen and pelvis requested by Dr. Ye and were done on 07/13/2019. The study reported no evidence of lymphadenopathy in the abdomen and pelvis. There was wall thickening of the rectosigmoid junction. Normal spleen, pancreas, adrenal glands and kidneys. There was fatty liver infiltration but no focal lymphatic lesions. There was a small 6-7 mm noncalcified nodule in the right upper lobe and a tiny 3 mm subpleural nodule in the right middle lobe. No mediastinal or hilar lymphadenopathy.     Dr. Ye performed staging rectal ultrasound examination. This study reported tumor penetrating through the muscularis propria as T3 disease; there were no lymph nodes identified.    She had a hospitalization in late September 2019 because of newly discovered pulmonary emboli.  She was on prophylactic Lovenox prior to the incident of PE.  Now she is on full dose Xarelto anticoagulation.  Patient reports no further chest pain dyspnea.  She denies bleeding or bruising.  During her hospitalization,  she was seen by Dr. Ye, who plans to have surgery 8 to 12 weeks after finishing radiation therapy.  She finished her radiation on 9/19/2019.     I noticed patient also presented to the emergency room on 10/1/2019 complaining of right flank area pain.  I reviewed the images studies and indeed she has a very small 1 to 2 mm obstructing kidney stone in the UV junction.  Patient is still symptomatic with some pains and dysuria.  She denies fever sweating or chills.    Laboratory study on 10/7/2019 reported normal CBC including hemoglobin 13.1, platelets 301,000, WBC 6170 and ANC 4900 lymphocytes 590 monocytes 480.      The nurse reported malfunction of port-a-catheter on 10/7/2019.  Port study on 10/8/2019 showed fibrin sheath around the distal aspect of the Mediport catheter in the SVC. This does not appear to hinder injection, but does prevent aspiration at this time.    Patient underwent surgical resection of the colon on 12/2/2019 with Dr. Ye.  Pathology evaluation reported residual T3 disease, also 1 out of 14 lymph nodes positive for malignancy.  So this patient in either had at least stage IIIb disease (T3 N1 M0?).      Adjuvant chemotherapy FOLFOX 6 will be started on 1/22/2020.  Laboratory study reported iron saturation 10%, free iron 31 TIBC 319 and ferritin 168.  Her hemoglobin was 11.8, WBC 5600, and platelets 347,000.    Patient was here on 02/12/2020 for cycle 2 of her FOLFOX.  This is delayed x1 week secondary to neutropenia.  The patient ultimately received 3 days worth of Neupogen with recovery of her blood counts.  Of note, the patient struggled with significant nausea without any episodes of vomiting following cycle #1 of chemotherapy resulting in significant weight loss.  She is up 12 pounds over the last week as her appetite has normalized.  We will add Emend to her care plan.    She is having several loose stools per her colostomy and has been hesitant to take Imodium due to prior history of  constipation.  I encouraged her to try this with a maximum of 8 tablets/day.  She denies any infectious symptoms including fevers or chills.  No excess bleeding or bruising noted.  She had the expected cold sensitivity related to the Oxaliplatin for about 3 days following treatment.    Labs from 02/12/2020 demonstrated total white blood cell count of 5.14, ANC of 3.06, hemoglobin of 11.2, platelets of 211,000.  She was found to be iron deficient last week and is intolerant to oral iron secondary to GI distress.  For this reason, she initiated IV iron therapy with Injectafer last week.  She had received her second dose 02/12/2020    Patient has normalized hemoglobin 12.2 on 2/26/2020.    She reported on 5/5/2020 she had a recent visit to the emergency department for what was suspected to be an allergic reaction.  She called our on-call service on Saturday with reports of hand and face swelling.  She was instructed to proceed to the emergency department at which time she was assessed and prescribed a Medrol Dosepak.  She had just completed her 5-FU and was unhooked on Friday, 5/3/2020.  Her symptoms have improved.  She does report persistent hyperpigmentation and mild swelling of the palms of the hands but this is much improved.  It was her right hand specifically that was swollen.  Her facial swelling has resolved.  She continues on Cefdinir nd since has 1 day remaining, she was prescribed cefdinir for a UTI requiring hospitalization at the end of April.  Reports no new symptoms.  Her labs are stable.  She is scheduled for treatment again.    Patient states at this time she is not tolerating her chemotherapy well.     Patient seen in the emergency department on 5/2/2020 for what was suspected to be an allergic reaction.  She called our on-call service on Saturday explaining that she was experiencing hand and face swelling.  She was instructed to proceed to the emergency department at which time she was assessed and  prescribed a Medrol Dosepak.  She had just completed her 5-FU and was unhooked on Friday, 5/1/2020.      She reports since her ED visit on 5/2/2020 her symptoms have not improved. Her hands and feet were swollen upon presenting to the ED and she could barely make a fist. She states that she feels so swollen she is not able to stand it. She states that her skin on the hands and feet are peeling extensively as well, besides changing color to more dark.     She also reports significant fatigue after her first week of the 5-FU treatment but she expected this side effect. She also notices significant increase in her neuropathy to the point that she is not able to even walk around in her home without her house slippers due to irritation from her rugs. She denies and associated nausea or vomiting at this time. She also has episodes of epistaxis every day after the chemotherapy cycle #7.  She does report working full-time during the week of chemotherapy.    Laboratory studies, 5/13/2020, show moderate thrombocytopenia platelets 123,000, low normal WBC 4140 including ANC 2720 and normal hemoglobin 13.4.  Because significant hand-foot syndrome, will decrease both 5-FU and oxaliplatin by 50%, and eliminate bolus dose of 5-FU.    On 5/21/2020 patient had a PET scan performed which showed no convincing evidence of residual disease in abdomen or pelvis, no metastatic disease within the chest or neck.     Cycle #8 FOLFOX 6 was given on 5/13/2020, with 50% dose reduction for both 5-FU and oxaliplatin, and also examination of bolus 5-FU.  She states on 5/27/2020 that with the recent reduction of the chemotherapy she feels significantly better. She has more energy and is able to do her daily routine.      PET scan examination on 5/21/2020 reported no evidence of metastatic disease in chest abdomen pelvis.  There was a stable 6 mm right upper lobe pulmonary nodule.    Laboratory studies on 5/27/2020 showed mild leukocytopenia WBC 3720  but a normal ANC 2250 and lymphocytes 630.  Normal platelets 163,000 and hemoglobin 12.6.  Chemistry lab reported normal renal function, liver function panel and a electrolytes, elevated glucose 164.    Laboratory studies 6/24/2020, showed normal hemoglobin 13.4 but macrocytosis .9.  Normal platelets 210,000 and WBC 5870.  She had normal CMP.     Patient last time was here in late June 2020.  Since that time she had surgical procedure for low anterior colon resection, coloanal anastomosis on 8/3/2020.  She later developed a perirectal abscess, required surgical drainage on 8/14/2020.  She was discharged on 8/27/2020.    Patient reports to me she still has lower abdominal wall vacuum suction in place.  She denies fever sweating chills.  Performance status is ECOG 1.  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  She does have visiting nurse come to the home for wound care.    CT scan for chest abdomen pelvis on 9/9/2020 reported a new 8 mm noncalcified pulmonary nodule in the left lower lobe.  Otherwise stable right upper lobe 6 mm pulmonary nodule, and no evidence of disease recurrence in the abdomen or pelvis.     Laboratory study on 9/16/2020 reported elevated AST 55, ALT 95, alk phosphatase 143 but normal total bilirubin 0.3.  Chemistry lab otherwise unremarkable.  She has completed normal CBC.      Because of the abnormal CT scan examination for chest abdomen pelvis on 9/9/2020 reported a new 8 mm noncalcified pulmonary nodule in the left lower lobe, we obtained a PET scan examination for further evaluation, which was done on 9/18/2020.  This study reported several pulmonary nodules, some of them are hypermetabolic, newly developed compared to previous PET scan in May 2020.  Certainly does highly suspicious for metastatic disease.  There are also hypermetabolic activity in the abdomen and pelvic area which is hard to differentiate from surgical scar versus metastatic  malignancy.    Laboratory study on 10/13/2020 reported normal CBC with Hb 13.9, platelets 302,000 and WBC 5520 including ANC 3830.  Chemistry lab reported normalized AST 20, alk phosphatase 116 improved ALT 35, and maintains normal bilirubin, with normal electrolytes and liver function panel.     Patient was started on first cycle of palliative chemotherapy FOLFIRI on 10/13/2020.    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  Patient had a new port placed 12/18/2020, and had held her Xarelto for 2 days prior to surgery.  She presented to the ER on 12/21/20 with complaints of facial and arm swelling for 3 days.  She also noted increased shortness of breath.  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  Thrombus within the right internal jugular vein cannot be excluded.  Patient was started on IV heparin, and eventually transitioned to weight-based Lovenox, which she now continues.    PET scan examination on 9/24/2021 reported further shrinking of the tiny right upper lobe pulmonary nodule.  Otherwise no new lesions.  No evidence for metastatic disease in other areas.      Patient had CT scan for chest with IV contrast obtained on 12/14/2021 which reported small tiny stable pulmonary nodules. There is a 4 mm right upper lobe pulmonary nodule. There is also a stable 4 mm left lower lobe pulmonary nodule. There is also stable left upper lobe micronodule.     Laboratory studies today on 12/21/2021 reported normal hemoglobin 15.9 however .0, platelets 218,000 WBC 5360 including neutrophils 3730 lymphocytes 980. Chemistry lab reported normal renal function, electrolytes, glucose, and marginally elevated ALT 55, AST 34 but normal total bilirubin and alk phosphatase.     CT scan for chest abdomen pelvis 6/21/2022 reported sub-6  mm pulmonary nodules either stable or slightly smaller.  No new pulmonary nodules or evidence for disease progression.  There is no evidence for metastatic disease in the liver however it shows diffuse hepatic steatosis.  There was masslike thickening in the presacral space with the clips or calcification approximately 1.7 cm in greatest AP dimension but stable.    Laboratory study on 9/20/2022 reported normal hemoglobin 15.7 with mild macrocytosis .1.  She has normal CBC and platelets.  She also has unremarkable CMP.  Normal CEA 1.13 ng/mL.    Patient was seen by Dr. Ye, who performed ileostomy takedown on 11/14/2022.  Patient reports that she is recovering.  She still has a small open wound less than 1 cm in diameter, however close to 2 cm deep.  She has been changing dressing herself.  She denies fever sweating chills.    Patient has no other specific complaints.  She has excellent performance status ECOG 0.  She denies chest pain dyspnea cough hemoptysis.  No abdominal pain.  No melena hematochezia.  Patient has been eating well, stable weight.  She works full-time.    She continues Lovenox injections and denies any significant bleeding issues.   No other new problems or concerns.     CT scan for chest abdomen pelvis obtained on 1/10/2023 reported stable small pulmonary nodules, no evidence for active or new disease.    Laboratory study on 1/10/2023 reported normal CBC and normal CMP.  CEA level is 0.99 ng/mL.    CT scan for chest, abdomen, and pelvis on 7/7/2023 reported stable small pulmonary nodules including a left upper lobe 5 mm nodule. There were no new masses, or pleural effusion. No enlarged supraclavicular, axillary, mediastinal, or hilar lymphadenopathy. Mediastinal vasculature normal. There is occlusion of the superior vena around the catheter with body wall  is present. There was no evidence for disease recurrence in the abdomen or pelvis. Bone is also negative for metastatic  disease.    Laboratory studies obtained on 07/07/2023 also reported normal CBC, and normal CMP as well as low level CEA 1.07 ng/mL.        MEDICATIONS    Current Outpatient Medications:     clonazePAM (KlonoPIN) 1 MG tablet, TAKE 1 TABLET BY MOUTH THREE TIMES A DAY AS NEEDED FOR ANXIETY, Disp: 90 tablet, Rfl: 0    Cyanocobalamin (VITAMIN B-12 PO), Take 1 tablet by mouth Every Other Day., Disp: , Rfl:     dicyclomine (BENTYL) 20 MG tablet, Take 1 tablet by mouth Every 6 (Six) Hours As Needed (for irritable bowel symptoms). Indications: Irritable Bowel Syndrome, Disp: 360 tablet, Rfl: 2    diphenoxylate-atropine (LOMOTIL) 2.5-0.025 MG per tablet, TAKE 2 TABLETS BY MOUTH 4 TIMES A DAY, Disp: 240 tablet, Rfl: 2    Enoxaparin Sodium (LOVENOX) 100 MG/ML solution prefilled syringe syringe, INJECT 1 ML UNDER THE SKIN INTO THE APPROPRIATE AREA AS DIRECTED EVERY 12 (TWELVE) HOURS., Disp: 60 mL, Rfl: 2    escitalopram (LEXAPRO) 10 MG tablet, TAKE 1 TABLET BY MOUTH EVERY DAY, Disp: 90 tablet, Rfl: 0    famotidine (PEPCID) 20 MG tablet, TAKE 1 TABLET BY MOUTH TWICE A DAY, Disp: 180 tablet, Rfl: 1    Imvexxy Maintenance Pack 10 MCG insert, Insert 10 mcg into the vagina 2 (Two) Times a Week., Disp: , Rfl:     Loperamide HCl (IMODIUM PO), Take  by mouth., Disp: , Rfl:     Loratadine 10 MG capsule, Take 1 capsule by mouth Every Evening., Disp: , Rfl:     metoprolol succinate XL (TOPROL-XL) 25 MG 24 hr tablet, TAKE 0.5 TABLETS BY MOUTH EVERY NIGHT, Disp: 45 tablet, Rfl: 0    ondansetron (ZOFRAN) 8 MG tablet, TAKE 1 TABLET BY MOUTH THREE TIMES A DAY AS NEEDED FOR NAUSEA AND VOMITING, Disp: 60 tablet, Rfl: 1    promethazine (PHENERGAN) 25 MG tablet, Take 1 tablet by mouth Every 6 (Six) Hours As Needed for Nausea or Vomiting., Disp: 60 tablet, Rfl: 2    ALLERGIES:   No Known Allergies    SOCIAL HISTORY:       Social History     Tobacco Use    Smoking status: Every Day     Packs/day: 1.00     Years: 25.00     Additional pack years: 0.00  "    Total pack years: 25.00     Types: Electronic Cigarette, Cigarettes     Passive exposure: Current    Smokeless tobacco: Never    Tobacco comments:     1 PPD/caffeine use    Vaping Use    Vaping Use: Some days    Substances: Nicotine    Devices: Disposable    Passive vaping exposure: Yes   Substance Use Topics    Alcohol use: Not Currently     Comment: RARELY    Drug use: Never         FAMILY HISTORY:   Mother has positive factor V Leiden mutation, although she did not have thrombosis, mother also is coronary disease with stenting, she also is occasional bruising.    Maternal grandmother had DVT, she had multiple surgical procedures.    Patient mother's half-brother had metastatic colon cancer at diagnosis in his 50s.    Maternal great aunt had breast cancer.           Vitals:    11/17/23 0806   BP: 102/72   Pulse: 75   Resp: 18   Temp: 98.6 °F (37 °C)   TempSrc: Temporal   SpO2: 98%   Weight: 88.6 kg (195 lb 6.4 oz)   Height: 167.6 cm (65.98\")   PainSc: 0-No pain         11/17/2023     8:07 AM   Current Status   ECOG score 0     Physical Exam  Vitals reviewed.   Constitutional:       Appearance: Normal appearance. She is well-developed.   HENT:      Head: Normocephalic and atraumatic.      Comments:      Cardiovascular:      Rate and Rhythm: Normal rate and regular rhythm.      Heart sounds: Normal heart sounds.   Pulmonary:      Effort: Pulmonary effort is normal.      Breath sounds: Normal breath sounds.   Abdominal:      General: Bowel sounds are normal.      Palpations: Abdomen is soft. There is no mass.      Tenderness: There is no abdominal tenderness.   Lymphadenopathy:      Cervical: No cervical adenopathy.   Neurological:      Mental Status: She is alert. Mental status is at baseline.   Psychiatric:         Mood and Affect: Mood normal.         RECENT LABS:    Lab Results   Component Value Date    WBC 5.82 10/27/2023    HGB 15.1 10/27/2023    HCT 46.6 10/27/2023    MCV 93.6 10/27/2023     " 10/27/2023     Lab Results   Component Value Date    GLUCOSE 98 10/27/2023    BUN 9 10/27/2023    CREATININE 0.75 10/27/2023    EGFR 100.8 10/27/2023    BCR 12.0 10/27/2023    K 4.5 10/27/2023    CO2 26.7 10/27/2023    CALCIUM 9.9 10/27/2023    ALBUMIN 3.9 10/27/2023    BILITOT 0.3 10/27/2023    AST 13 10/27/2023    ALT 12 10/27/2023     Lab Results   Component Value Date    CEA 1.58 10/27/2023                      IMAGING:  NM PET/CT Skull Base to Mid Thigh  Narrative: F-18 FDG PET SKULL BASE TO MID THIGH WITH PET CT FUSION.     HISTORY: 43-year-old female with metastatic rectal cancer. Restaging.     TECHNIQUE: Radiation dose reduction techniques were utilized, including  automated exposure control and exposure modulation based on body size.   Blood glucose level at time of injection was 116 mg/dL. 7.0 mCi of F-18  FDG were injected and PET was performed from skull base to mid thigh. CT  was obtained for localization and attenuation correction. Time at  injection 7:58 a.m. PET start time 9:17 a.m. Compared with CTs  10/27/2023.     FINDINGS: There are a few bilateral hypermetabolic pulmonary nodules and  the largest measures 9 mm at the right upper lobe with a maximal SUV of  3.9. There is no hypermetabolic mediastinal or hilar lymphadenopathy.  There is no suspicious hypermetabolic activity at the supraclavicular  regions or neck. There is no hypermetabolic lymphadenopathy or  suspicious activity within the abdomen or pelvis. There is stable  low-level activity at the presacral soft tissue thickening with a  maximal SUV of 2.4. Nonspecific low-level bowel activity.     Impression: 1. Development of hypermetabolic pulmonary nodules which are likely  metastases. There is no hypermetabolic lymphadenopathy.  2. Stable presacral soft tissue thickening.     This report was finalized on 11/9/2023 4:06 PM by Dr. Caroline Solano M.D on  Workstation: [id number]         ASSESSMENT:   1.  Metastatic rectal cancer.   Initial  rectal ultrasound examination reported T3N0 disease without lymphadenopathy.   CT scan of chest, abdomen and pelvis reported no lymphadenopathy in the abdomen, pelvis or chest. She does have small pulmonary nodule 6-7 mm and 2 mm with indeterminate feature. There is no solid evidence of metastatic disease otherwise.   Based on the CT scan and rectal ultrasound imaging studies, this patient had stage IIA (T3N0M0) disease.   She had PET scan examination on 8/8/2019 which reported a hypermetabolic right upper lobe pulmonary nodule 6 mm with SUV 5.6.  This is suspicious for metastatic disease however it is too small to be biopsied.  This patient may have stage IV disease.   She initiated concurrent radiation with continuous 5-FU on 8/12/2019.  Patient finished concurrent chemoradiation therapy.  Patient underwent surgical resection of the rectal tumor and diverting loop ileostomy on 12/2/2019 with Dr. Ye.  Pathology evaluation reported residual T3 disease, with 1 out of 14 lymph nodes positive for malignancy.  Certainly this patient has at least stage IIIb rectal cancer (T3N1M0?)  On 1/22/2020 adjuvant chemotherapy FOLFOX 6 regimen initiated.   On 2/5/2022 cycle #2 was delayed secondary to neutropenia.  She was given 3 days of Granix.   Emend added with cycle 3.  With improved nausea control  Continuing home Granix x3 days following 5-FU disconnect  3/11/2020 due for cycle 4, however, she is experiencing progressive abdominal pain and occasional fevers.   CT scan performed on 3/13/2020 reveals no evidence of progressive or recurrent disease.  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  Patient hospitalized 4/24-4/26/20 after cycle 5 of chemotherapy with acute UTI.  CT abdomen/pelvis noting fluid collection in the presacral region having diminished in size compared to CT dated 3/13/2020.  Patient was evaluated by Dr. Ye while in the hospital with further plans to evaluate possible colovaginal fistula following  completion of chemotherapy.  Patient did respond to IV antibiotics and discharged home on oral cefdinir.  4/29/2020 cycle 6 of FOLFOX.  Urinary symptoms have resolved   Patient seen in [facility] ED on 5/2/2020 for what was suspected to be an allergic reaction.  She called our service on Saturday explaining that she was experiencing hand and face swelling.  She was instructed to proceed to the emergency department at which time she was assessed and prescribed a Medrol Dosepak.  She had just completed her 5-FU and was unhooked on Friday, 5/1/2020.  Her symptoms have resolved in the office on assessment, 5/5/2020.  The patient had grade 3 hand-foot syndrome based on symptomology.  Patient had cycle #8 of 5-FU on 5/13/2020. Due to her symptoms and poor tolerance to the 5-FU, her treatment dose will be reduced 50% for oxaliplatin and infusional 5-FU.  Bolus 5-FU will be discontinued..  On 5/21/2020 patient had a PET scan and it showed no evidence of of metastatic disease in the neck, chest, abdomen or pelvis.  There was fluid accumulation/abscess.   On 5/27/2020 discussed with the patient that we can discontinue chemotherapy at this time.  She will follow-up with Dr. Ye to discuss a possibility to reverse the ileostomy.  We likely will obtain anther PET scan in 3 to 4 months for reassessment.    Patient was seen by Dr. Ye on 6/19/2019 for evaluation and to discuss possible take down of her ileostomy.  She is scheduled to have a gastrografin enema on 7/2/2020 to evaluate for a fistula, and then a colonoscopy on 7/15/2020, both done by Dr. Ye.  She states that based on the above imaging and procedure findings, she may have a more extensive surgery done or just have her ileostomy reversed, which would likely be done in August 2020.  This will all be coordinated under the care of Dr. Ye.     CT scan from 09/09/2020 and also compared to her previous PET scan examination from 05/21/2020 and also the original  PET scan from 08/09/2019.  The original PET scan there is a small right upper lobe pulmonary nodule 6 mm with SUV 5.6. So in the 05/2020 PET scan the nodule is still there but activity seems much less and this most recent CT scan examination also documented the preexisting small pulmonary nodule however there is a new left lower lobe pulmonary nodule 8 mm in size and I shared with the patient today this nodule is suspicious for metastatic disease. Laboratory studies reported minimal CEA level 1.32 on 09/09/2020. Liver function panel was only marginally abnormal with the ALT 45, the remaining is normal. However laboratory study today showed worsening AST 55, ALT 95 and alkaline phosphatase 143 but is still normal, total bilirubin 0.3.   Recommended to have repeat PET scan examination for assessment because the left lower lobe pulmonary nodule is too small to be biopsied, and if the PET scan reports hypermetabolic lesion, I recommended to have stereotactic radiation therapy. Explained to patient that she is not a really good candidate to have thoracotomy for resection because she still has unhealed wound in the abdomen. Stereotactic radiation therapy will likely be a more feasible choice.   PET scan examination obtained on 9/18/2020 showed metastatic disease.  It reported a few hypermetabolic pulmonary nodules, and some new small pulmonary nodules, all of them highly suspicious for metastatic disease.  She also has hypermetabolic activity in the abdomen and pelvic area which could be related to scar tissue from her recent surgery versus metastatic disease.  Nevertheless overall picture fits with metastatic cancer.  Discussed with the patient on 9/20/2020, we reviewed the PET scan images together, and I recommended to have systematic chemotherapy, I do not think stereotactic reading therapy to the hypermetabolic lesions in the lungs warranted at this time because even those will be treated with reading therapy, she  will still need systematic chemotherapy.  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  I did discuss with the patient using anti-EGFR monoclonal antibody versus anti-VEGF monoclonal antibody.     Palliative chemotherapy cycle#1 FOLFIRI was started on 10/13/2020.  No bolus 5-FU given considering previous poor tolerance.    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  Microsatellite stable, mutation burden 5/Mb.   Discussed with the patient 10/27/2020, because of the K-s[place]ia mutation, she is not a candidate for anti-EGFR monoclonal antibody such as Erbitux or vectibix.  She could be a candidate for anti-VEGF monoclonal antibody, however because of active wound, she is not a candidate right now at this moment.  Patient seen in the ED for acute right neck pain on 11/16/2020.  CT angiogram as well as CT of the cervical spine performed with no acute findings but notably one of her pulmonary nodules, left upper lobe, somewhat decreased in size.  Patient given prednisone pack.  Further details below.  Cycle #6 chemotherapy will be resumed on 1/5/2021.  Started back on original irinotecan.  PET scan examination obtained on 1/12/2021 after cycle #6 chemotherapy reported improved pulmonary nodule hypermetabolic activity.  Confirmed these are metastatic lesions.  Patient is evaluated 1/19/2020, will continue cycle #7 FOLFIRI chemotherapy.  However Avastin will be on hold see next section.   Patient was reevaluated on 2/2/2021, will continue chemotherapy cycle #8 FOLFIRI.  Avastin / biosimilar will be added.    She talked to [facility] specialist in mid February 2021, and had recommended palliative chemotherapy without surgical intervention of metastatic lung lesions.   On 3/2/2021, patient will proceed with cycle #10 FOLFIRI plus bevacizumab.  After 12 cycles, plan to start maintenance treatment.  3/16/2021 cycle 11.  Plus  bevacizumab.  3/30/2021 cycle 12 FOLFIRI plus bevacizumab.  Having issues with worsening nausea despite premeds with dexamethasone, Aloxi, Emend, and Zofran at home.  Patient is requesting a dose of Phenergan, which is helped her in the past.  We will give this to her with treatment today.  PET scan examination on 4/6/2021 reported no evidence of hypermetabolic metastatic lesion.  Discussed with the patient on 4/13/2021, we reviewed the PET scan results.  We recommended to switch to maintenance chemotherapy without irinotecan.  We will continue 5-FU and Avastin every 2 weeks.  We discussed possibility of switching to oral Xeloda treatment.  Discussed with the patient for side effects more so with hand-foot syndrome.  Patient is agreeable.   Xeloda 2000 mg twice daily 7 days on, 7 days off along with Avastin initiated 4/27/2021.  After only 5 doses of Xeloda significant hand-foot syndrome, Xeloda held. Patient requesting to be transitioned back to infusional 5-FU, as she felt that she tolerated infusional 5-FU without any problem.  The patient will receive 5-FU leucovorin and Avastin every 2 weeks.  Xeloda discontinued.  5/25/2021 continued cycle #4 maintenance 5-FU, leucovorin, Avastin tolerating this much better so far, we will continue this. Recommended to have 12 cycles of maintenance chemotherapy, then obtain PET scan for reevaluation.  We could discuss further treatment plan at that time.  9/14/2021 patient due for cycle 12 of additional maintenance 5-FU/leucovorin plus Avastin.  She continues to tolerate treatment well overall.  She is anxious in the office today with her Mediport not getting blood at first and also with upcoming scans being heavy on her mind.  She was instructed to take one of her Klonopin pills while here to help calm her mind.  Heart rate did improve from 140s down to 112.  Proceed with treatment today as scheduled.  PET scan examination on 9/24/2021 reported further shrinking of the right  upper lobe tiny pulmonary nodule.  No other new lesions.  Discussed with patient on 9/24/2021 about further shrinking of the right upper lobe pulmonary nodule and no evidence for other new lesions. We recommended to have chemo break for 3 months with repeated CT scan for reevaluation.  Recent CT scan for chest 12/14/2021 and abdomen CT from 11/29/2021 reported no evidence for active disease. The right upper lobe pulmonary nodule, left lower lobe pulmonary nodule minimal about 4 mm and stable. I discussed with patient today on 12/21/2021, recommended to give her another 3 months chemotherapy holiday and obtain CT scan afterwards for reevaluation. After discussion, patient is agreeable.   CT scan examination for chest abdomen and pelvis obtained on 3/15/2022 reported a slight increase of the left upper lobe pulmonary nodule 5 mm, from previous 3 mm.  The other pulmonary nodules were stable in size.  There is also small left upper lobe groundglass changes.   I reviewed images studies with the patient today on 3/23/2022, compared to multiple previous images including CT chest CT and PET scan, suspected disease progression.  Discussed with the patient, and I recommended to resume maintenance chemotherapy with 5-FU plus leucovorin plus Avastin repeating every 2 weeks, and obtaining CT scan for reassessment in 3 months.  Patient is agreeable.  Patient resumed maintenance chemotherapy on 3/29/2022 with 5-FU leucovorin and Avastin.   4/26/2022, proceed with cycle #27 maintenance 5-FU, leucovorin, and Avastin.  Patient continues to tolerate treatment well.    On 5/10/2022, we will proceed ahead with cycle #28 maintenance chemotherapy.  Plan to have CT scan after cycle #30.  5/24/2022 cycle 29 maintenance chemotherapy.  Continue to tolerate well.  6/7/2022 cycle #30 maintenance chemotherapy, continuing to tolerate well.   CT scan for chest abdomen pelvis with IV contrast obtained on 6/21/2022 reported sub-6 mm pulmonary  nodules either stable or slightly smaller.  We reviewed the images with the patient together.  We discussed with the patient and recommended to hold chemotherapy for now with repeating CT scan in 3 months for reassessment.  CT scan of the chest abdomen pelvis 9/13/2022 reported stable condition, no evidence for recurrent or metastatic colon cancer.  On 1/10/2023 patient had a CT chest abdomen pelvis with reported no evidence for disease progression.  Laboratory study also showed normal CEA.   Patient had a CT scan for chest, abdomen, and pelvis obtained on 04/11/2023. This study reported very small pulmonary nodules, the right upper lobe nodule is stable to 6 mm, however, the left upper lobe and the left lower lobe nodule increased to 5 mm from previous 4 mm. I discussed with the patient today on 04/19/2023, I recommend to obtain another CT scan in 3 months for reassessment. The nodules are so small, they likely will not be picked up by PET scan examination.  CT scan examination of the chest, abdomen, and pelvis obtained on 7/7/2023 reported stable small pulmonary nodules. No evidence for disease progression or new lesions in chest, abdomen, and pelvis.  Discussed with patient today on 7/14/2023, however, patient is concerning for disease progression. I recommended to obtain Guardant Reveal for circulating tumor DNA study. If the study is positive, we will further obtain PET scan examination, otherwise, we will obtain CT scan for chest, abdomen, and pelvis in 3 months for reassessment.  The patient had CT scan for the chest, abdomen, and pelvis obtained on 10/27/2023, which reported worsening pulmonary nodules at bilateral upper lobes. Laboratory studies showed low normal CEA level and normal liver function panel. The Guardant Reveal study is still pending. I discussed this with the patient on 11/03/2023 and we shared the images, and I recommended having a PET scan examination for further assessment. I will present  her case at the multimodality lung conference. If those lesions are deemed to be metastatic lesions, I recommend having stereotactic radiotherapy. I discussed it with the patient, and she voiced understanding and was agreeable with that strategy.  I presented her case at the multimodality chest conference 11/16/2023.  The consensus was for patient to receive stereotactic radiation therapy for the right upper lobe lung lesion, and the bigger left upper lobe lesion, and monitor the smaller left upper lobe lesion with further images studies down the line. Also, the conference recommended Guardant Reveal study which we already ordered. I discussed with the patient today on 11/17/2023, we explained to the patient that the opinion of the conference and she is agreeable with this and prefers this strategy because of limited toxicity compared to long term commitment to starting chemotherapy again. I recommend to obtain a CT scan for the chest, abdomen, and pelvis with both IV and oral contrast for reassessment in 3 months for reassessment of metastatic lung lesions and thickness in the pelvis where the PET scan reported a low activity SUV 2.4 in the surgical bed.      2.   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  Low-dose Lovenox discontinued and initiated Xarelto 20 mg daily.  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  Patient held Xarelto for 2 days prior to MediPort removal from the left chest wall and placement of new port  in the right chest wall on 12/18/2020.  She resumed Xarelto that evening.  Progressive swelling in the neck, face, upper extremities prompting hospitalization with CT scan showing thrombosis in the right subclavian vein and SVC.  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  Although she had been off of Xarelto for a few days, clearly Xarelto was not prohibiting progression of her thrombosis after she resumed treatment and there was some evidence to suggest thrombosis had been present at least in the innominate vein on prior scan in September but now appears chronic.  Current acute thrombosis involving SVC and right subclavian.  Patient was admitted and placed on heparin drip  Patient was evaluated by vascular surgery and intervention was not recommended for thrombolysis/thrombectomy.  On 12/22/2020, the patient developed worsening shortness of breath, increasing upper extremity edema consistent with worsening SVC syndrome.  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  Symptoms improved and patient was discharged 12/24/2020 on Lovenox 100 mg every 12 hours.    Returns 12/29/2020 for evaluation continuing Lovenox, overall tolerating it well.  No bleeding issues.  Improved edema 1/5/2021.  Will continue Lovenox weight-based every 12 hours.  On 1/19/2021 and 2/2/2021, patient reports improved facial swelling.  She however does have being bruise on her abdomen wall where she does Lovenox injection.  However she does not have a spontaneous epistaxis, gum bleeding or other bleeding.   On 2/2/2021, we discussed that side effects of Avastin/biosimilar related to thrombosis.  Since this patient already has been on Lovenox, I think the benefits from treatment for her cancer will outweigh that risk of thrombosis, will proceed ahead with  Avastin.  I cautioned patient to watch out for signs of worsening thrombosis patient voiced understanding.   On 3/16/2021, no evidence of worsening DVT, continue Lovenox.  5/11/2021 Lovenox dosing will be adjusted due to patient's weight loss.  We discussed she needs 1 mg/kg.  Her syringes are 100 mg syringes she will do 0.9 mL subcu every 12 hours.  8/3/2021 Patient continues to have bruising on abdomen from lovenox injections.  Will need to monitor weight and adjust dosing in future if further weight loss.   Stable condition on 9/28/2021.  Continue Lovenox anticoagulation.    Patient reports no chest pain no dyspnea no leg edema. However she had bruising and also most recently abnormal small abscess for which she actually went to ER on 11/29/2021, had I&D. The wound is healing. No further drainage. Denies fever sweating or chills. After discussion, she will continue Lovenox injection for now.   On 3/23/2022, patient reports good tolerance of Lovenox.  Nevertheless she still has small quantity of pus in the left lower abdomen abscess, she squeezes it every day to prevent it became worse.  She reports no fever sweating chills.  Recommended to start Augmentin 875 mg twice a day for 7 days.  She will continue Lovenox indefinitely.  On 4/12/2022, patient reports resolution of the small abscess at left lower abdominal wall.   On 5/10/2022, patient continues on Lovenox indefinitely.  No easy bleeding or bruising.  6/23/2022 continuing on Lovenox 1 mg/kg at a dose of 100 mg (patient weight is 96.6 kg today) every 12 hours.  On 1/17/2023, patient reports good tolerance, Lovenox will be continued.    Patient had a venous Doppler study on 02/05/2023, which reported acute left upper extremity DVT in the subclavian vein, axillary and the brachial vein, and also superficial thrombophlebitis in the basilic upper arm.   On 04/19/2023, patient reports that she was not compliant with anticoagulation at the time of recurrent DVT.  She now is fully compliant and is using Lovenox.  Today there is no evidence of recurrent thrombosis. Patient will continue Lovenox anticoagulation.    3.  This patient also has strong family history for malignancy   The patient had appointment with genetic counseling on September 3, 2019.  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    4.  Mild anemia.   She also has history of iron deficiency.  Iron studies reveal iron saturation of 10%.  She was started on oral iron but was unable to tolerate due to GI side effects.   Status post Injectafer 2/5/2020 and 2/12/2020   Improved hemoglobin 13.5 on 1/5/2021.  3/16/2020 when hemoglobin now up to 16.2, hematocrit 47.6.  Patient admits that she has started smoking again, and I have encouraged her to quit.  She denies any snoring or sleep apnea diagnosis.  Patient is not taking oral iron.  We will closely monitor.  3/30/2020 hemoglobin 15.7, HCT 47.5.  Patient states that she has cut back significantly on her smoking, although not quit yet.  I have encouraged her to continue working on this.  We will continue to closely monitor.  Hemoglobin 14.6 on 6/8/2021 at the meantime he has worsening macrocytosis .6.    Laboratory studies 6/22/2021 reported excellent folate > 20 ng/mL, however low normal B12 level 357 pg/mL.    On 7/6/2021, normal hemoglobin 15.8, .9 and HCT 47.2%.  Patient was asked to start oral vitamin B12 at 1000 mcg daily.   9/14/2021 hemoglobin 17.0, hematocrit 52.1.  Monitor.  On 9/28/2021 hemoglobin 16.1 hematocrit 48.5%, continue to monitor.   Lab study on 12/14/2021 reported excellent ferritin 296 and iron saturation 25% with free iron 104 TIBC 424. Hemoglobin was 15.2 with .2.   Normal hemoglobin 14.6 on 3/15/2022.  Iron study reported normal ferritin 129.5 ng/mL however slightly low iron saturation 11%.  Continue to monitor for now.  4/26/2022, hemoglobin 14.8.  6/7/2022 hemoglobin 15.5.  On 9/20/2022 Hb 15.7, .1.  On 1/10/2023  normal hemoglobin 14.7 MCV 95.0.  Laboratory study on 04/11/2023 reported normal hemoglobin 13.9.  Lab study on 7/7/2023 reported normal hemoglobin 14.9.  A laboratory study on 10/27/2023 reported normal hemoglobin at 15.1.    5.  Peripheral neuropathy secondary to chemotherapy.   This is mild involving bilateral hands and feet as reported on 9/16/2020.   Will avoid chemotherapy with oxaliplatin.  Stable mild neuropathy as of 11/10/2020  Patient reports worsening peripheral neuropathy and cycle #5 chemotherapy on 12/8/2020 with and without irinotecan.   On 1/5/2021, patient reports improvement of peripheral neuropathy, will add irinotecan back to chemotherapy.  Continue to monitor and adjust medication.  On 3/2/2021, patient reports no worsening of peripheral neuropathy after restarting irinotecan.   This remains stable, may be slightly better as reported on 3/23/2022..   No worsening since resuming chemotherapy on 3/29/2022.  On 6/23/2022 peripheral neuropathy remains stable.  No worsening peripheral neuropathy today.     6.  History of hand-foot syndrome grade 3.    It become so significant after cycle #7 FOLFOX treatment.  Discussed with patient on 5/13/2020, will discontinue the bolus 5-FU dose, and also decrease 50% of the infusion dose through the pump.   We also use Medrol Dosepak for possible recurrent symptomology.  She responded this well.   Her hand-foot syndrome improved.  Under current treatment with FOLFIRI, patient not receiving 5-FU bolus.  No recurrent issues.   Patient will be switched to maintenance chemotherapy using Xeloda in late April 2021.  Following 5 doses of Xeloda, significant hand-foot syndrome with pain in the feet, significant erythema.  Xeloda held.  Will be further discussed with Dr. Nguyen to determine if the patient will resume 5-FU versus a dose reduction to Xeloda.  Aggressive emollient moisturizer use twice daily for the past week and patient reports improvement in the dryness  and erythema.  Xeloda will now be discontinued and patient will switch back to 5-FU.  As of 5/25/2021 dryness and erythema/hyperpigmentation continues to improve.  Xeloda has been discontinued.  6/8/2021, patient reports much improved hand-foot syndrome, with remnant pigmentation on palms.  On 7/6/2021, patient reports and had a some flareup of hand-foot syndrome, however improved after aggressive moisturizing cream and the urea cream.  Overall much tolerated infusional 5-FU compared to Xeloda.    7/20/2021 continues to have mild hyperpigmentation of her hands and feet bilaterally, she continues aggressive moisturization with utter balm with urea.  She also reports some soreness of her tailbone, and we discussed that this is likely due to loss of weight and muscle mass.  I advised her to go ahead and apply moisturizer to this area as there is one tiny fissure.  Also recommended using a waffle pad or doughnut to sit on.  8/3/2021 patient reports her hand foot symptoms are stable and without worsening.  Continue to monitor.  Symptoms stable, typically flaring about 24 hours after she is unhooked from her 5-FU infusional ball and then settling down with some mild peeling.   Since off chemotherapy no specific complaints.   No recurrent symptoms after resuming chemotherapy on 3/29/2022.  5/24/2022 does report some mild symptoms about 24 hours after disconnect maintenance chemotherapy.  She is using utterly smooth twice a day.  On 9/20/2022 patient reports mild peripheral neuropathy.  Stable condition today..      7.  Depression.  Patient seen by JULIET Davenport on 11/9/2020.  She was started on mirtazapine.  Lexapro was discontinued.  This has definitely improved her mood with the patient feeling overall much better.  She is sleeping better.  Appetite is improved with her actually eating more than she wants to.    Condition is stable.  She plans to continue follow-up with supportive oncology.    8. Malfunction of  the portal catheter  Patient reports there was no blood drawn from the portal catheter. CT scan of the chest on 7/7/2023 reported occlusion of the SVC around to the Port-A-Cath tubing. I recommend trying activase to see if it helps.  Otherwise, we may have to remove the catheter. Clinically, patient has no signs of SVC syndrome.  On 11/03/2023, the patient reports that her Port-A-Cath was able to be used for a CT scan for the injection of intravenous dye; however, there was no blood return, so we needed to draw from her arm for the blood test. We will continue to keep Port-A-Cath for now.      PLAN:    Refer patient to radiation oncology for evaluation and to start stereotactic radiotherapy for metastatic pulmonary lesions. One in the right upper lobe lesion and also one of the left upper lobe lesions.  Patient will continue synovectomy injection of Lovenox at 1 mg/kg every 12 hours.  I will arrange patient to have CT scan for chest, abdomen, and pelvis with both IV and oral contrast to be done in about 3 months for reassessment.  Patient will see me in 1 week after the CT scan evaluation in 3 months, and we will check CBC, CMP at that time.    I presented her case at the multimodality chest conference yesterday, I also shared the PET scan images with the patient.  We discussed with the treatment plan and she voiced satisfaction.         Dong Nguyen MD PhD           CC:  Deepika Vela III NP-C   Padmaja Ye MD      Transcribed from ambient dictation for Dong Nguyen MD PhD by Alexandra Contreras.  11/17/23   10:06 EST    Patient or patient representative verbalized consent to the visit recording.  I have personally performed the services described in this document as transcribed by the above individual, and it is both accurate and complete.    Dong Nguyen MD PhD

## 2023-11-18 PROBLEM — Z86.711 HISTORY OF PULMONARY EMBOLISM: Status: ACTIVE | Noted: 2023-11-18

## 2023-11-20 ENCOUNTER — TELEPHONE (OUTPATIENT)
Dept: RADIATION ONCOLOGY | Facility: HOSPITAL | Age: 43
End: 2023-11-20
Payer: COMMERCIAL

## 2023-11-21 ENCOUNTER — HOSPITAL ENCOUNTER (OUTPATIENT)
Dept: RADIATION ONCOLOGY | Facility: HOSPITAL | Age: 43
Setting detail: RADIATION/ONCOLOGY SERIES
End: 2023-11-21
Payer: COMMERCIAL

## 2023-11-21 ENCOUNTER — CONSULT (OUTPATIENT)
Dept: RADIATION ONCOLOGY | Facility: HOSPITAL | Age: 43
End: 2023-11-21
Payer: COMMERCIAL

## 2023-11-21 VITALS
WEIGHT: 195 LBS | BODY MASS INDEX: 31.49 KG/M2 | DIASTOLIC BLOOD PRESSURE: 72 MMHG | OXYGEN SATURATION: 100 % | HEART RATE: 80 BPM | SYSTOLIC BLOOD PRESSURE: 113 MMHG

## 2023-11-21 DIAGNOSIS — C78.01 MALIGNANT NEOPLASM METASTATIC TO BOTH LUNGS: ICD-10-CM

## 2023-11-21 DIAGNOSIS — C20 ADENOCARCINOMA OF RECTUM: Primary | Chronic | ICD-10-CM

## 2023-11-21 DIAGNOSIS — C78.02 MALIGNANT NEOPLASM METASTATIC TO BOTH LUNGS: ICD-10-CM

## 2023-11-21 NOTE — PROGRESS NOTES
[facility] Radiation Oncology   Consult    Chief Complaint  Increasing size pulmonary nodules concerning for metastatic malignancy from rectal primary      Diagnosis:    Diagnosis Plan   1. Adenocarcinoma of rectum        2. Malignant neoplasm metastatic to both lungs              Overall Stage    Cancer Staging   Stage IIIB (pT3, pN1, cM0) rectal adenocarcinoma at time of diagnosis    Now with concern for progressive/metastatic disease in the lung        Pacemaker: no  Prior History of Radiation: yes, patient received 50.4 Gray in 28 fractions to the rectum  Contraindications to Radiation: no  Patient Requires Pregnancy Test: Yes    HPI:    Carole Weaver is a 43 y.o. female with history of stage IIIb, [id number], rectal adenocarcinoma diagnosed in 2019 sp neoadjuvant chemoradiation followed by surgical resection of the rectal primary on 12/2/2019.  Staging at time of surgery was stage IIIB, [id number], rectal adenocarcinoma.  At time of diagnosis the patient had an 8 mm hypermetabolic right upper lobe pulmonary nodule suspicious for metastasis.    The patient has history of rectal adenocarcinoma as noted above.    The patient received 8 cycles of adjuvant FOLFOX completed in May 2020.    5/21/2020 PET scan showed no evidence of metastatic disease with a stable right upper lobe pulmonary nodule.    9/18/2020 PET/CT showed approximately 5-7 bilateral pulmonary nodules a few of which are generally increased in size and demonstrate FDG uptake.  Findings were concerning for metastatic disease.     The patient initiated chemotherapy with FOLFIRI on 10/13/2020.    4/6/2021 PET/CT 6 showed marginal decrease in size of a 5 mm right upper lobe pulmonary nodule with no hypermetabolic activity.    9/24/2023 PET/CT again showed a solitary tiny residual right upper lobe pulmonary nodule with equivocal uptake and further decrease in size.    At this time patient went on chemotherapy holiday.    3/15/2022 CT chest showed slight increase  in prominence in size of few sub-6 mm pulmonary nodules.  There is size limits evaluation.  Continue close follow-up was recommended.    Maintenance chemotherapy was restarted.    6/21/2022 CT scan again showed stable disease in the chest.  Maintenance chemotherapy was again halted.    1/10/2023 CT of the chest abdomen pelvis again showed stable pulmonary nodules with no evidence of active or new disease.    7/7/2023 CT chest again showed stable pulmonary nodules.    10/27/2023 CT chest was again performed and showed bilateral pulmonary nodules likely representing metastatic disease with index lesion appearing to have minimally increased in size from 7/7/2023.  The index nodule in the right upper lobe no measured 0.9 x 0.8 cm.  The index lesion in the left upper lobe measuring 0.8 x 0.9 cm.    11/8/2023 PET/CT was performed and showed development of hypermetabolic pulmonary nodules concerning for metastatic disease.  The 9 mm right upper lobe nodule had a max SUV of 3.9.        Imaging:    NM PET/CT Skull Base to Mid Thigh    Result Date: 11/9/2023  1. Development of hypermetabolic pulmonary nodules which are likely metastases. There is no hypermetabolic lymphadenopathy. 2. Stable presacral soft tissue thickening.  This report was finalized on 11/9/2023 4:06 PM by Dr. Caroline Solano M.D on Workstation: [id number]      CT Chest With Contrast Diagnostic    Result Date: 10/30/2023  Impression: 1.  Bilateral pulmonary nodules likely representing metastatic disease with index nodules which appear to have minimally increased in size since 7/7/2023 as detailed above. 2.  No new CT findings of metastatic disease in the abdomen and pelvis. Stable sclerotic lesions in the pelvis 3.  Continued improvement in the degree of presacral soft tissue thickening over multiple prior CTs. 4.  Other findings as above.    This report was finalized on 10/30/2023 4:38 PM by Dr. Kyle Diego M.D on Workstation: [id number]      CT Abdomen  Pelvis With Contrast    Result Date: 10/30/2023  Impression: 1.  Bilateral pulmonary nodules likely representing metastatic disease with index nodules which appear to have minimally increased in size since 7/7/2023 as detailed above. 2.  No new CT findings of metastatic disease in the abdomen and pelvis. Stable sclerotic lesions in the pelvis 3.  Continued improvement in the degree of presacral soft tissue thickening over multiple prior CTs. 4.  Other findings as above.    This report was finalized on 10/30/2023 4:38 PM by Dr. Kyle Diego M.D on Workstation: [id number]         Pathology:    12/2/2019  Synoptic Checklist   COLON AND RECTUM: Resection, Including Transanal Disk Excision of Rectal Neoplasms   8th Edition - Protocol posted: 2/27/2019ColoRectal - 1  SPECIMEN   Procedure  Low anterior resection   Macroscopic Intactness of Mesorectum  Complete   TUMOR   Tumor Site  Rectum   Tumor Location  Entirely below the anterior peritoneal reflection   Histologic Type  Adenocarcinoma   Histologic Grade  G2: Moderately differentiated   Tumor Size  Greatest dimension (Centimeters): 3.5 cm   Additional Dimension (Centimeters)  2.5 cm   Tumor Deposits  Not identified   Tumor Extension  Tumor invades through the muscularis propria into pericolorectal tissue   Macroscopic Tumor Perforation  Not identified   Lymphovascular Invasion  Not identified   Perineural Invasion  Present   Type of Polyp in Which Invasive Carcinoma Arose  None identified   Treatment Effect  Present     Residual cancer with evident tumor regression, but more than single cells or rare small groups of cancer cells (partial response, score 2)   MARGINS   Margins  All margins are uninvolved by invasive carcinoma, high-grade dysplasia, intramucosal adenocarcinoma, and adenoma   Margins Examined  Proximal     Distal     Radial or Mesenteric   Distance of Invasive Carcinoma from Closest Margin  1.5 cm   Closest Margin  Radial or Mesenteric   Distance of Tumor  from Radial Margin  1.5 cm   Distance of Tumor from Distal Margin  5.0 cm   LYMPH NODES   Number of Lymph Nodes Involved  1   Number of Lymph Nodes Examined  14   PATHOLOGIC STAGE CLASSIFICATION (pTNM, AJCC 8th Edition)   TNM Descriptors  y (post-treatment)   Primary Tumor (pT)  pT3   Regional Lymph Nodes (pN)  pN1a   ADDITIONAL FINDINGS   Additional Pathologic Findings  None identified          Labs:    Lab Results   Component Value Date    CREATININE 0.75 10/27/2023                 Problem List:  Patient Active Problem List   Diagnosis    Anxiety    Irritable bowel syndrome    Monopolar depression    Adenocarcinoma of rectum    Family history of factor V Leiden mutation    Heterozygous factor V Leiden mutation    Encounter for fitting and adjustment of vascular catheter    Functional diarrhea    Adverse effect of antineoplastic and immunosuppressive drugs, initial encounter    Hypokalemia    Hypomagnesemia    Other pulmonary embolism without acute cor pulmonale    Kidney stone on right side    Hydronephrosis with ureteropelvic junction (UPJ) obstruction    Partial intestinal obstruction, unspecified as to cause    Irregular heart beat    Gastrointestinal hemorrhage, unspecified    Esophagitis    Family history of colonic polyps    Chronic diarrhea    Calculus of gallbladder    Benign neoplasm of transverse colon    Benign neoplasm of rectum and anal canal    Loss of weight    Heart murmur    Anemia    Anticoagulation adequate    Iron deficiency    Adverse effect of iron    Drug-induced peripheral neuropathy    On antineoplastic chemotherapy    Chemotherapy-induced thrombocytopenia    HTN (hypertension)    Chronic anticoagulation    Chemotherapy-induced neutropenia    Hand foot syndrome    Pulmonary nodule, right    Perirectal abscess    Pulmonary nodules    Secondary cancer of lung    Leukocytopenia    Thrombosis of superior vena cava    Tachycardia    History of rectal cancer    Macrocytosis without anemia     Right ureteral stone    Pulmonary embolism without acute cor pulmonale    Palmar-plantar erythrodysesthesia    On anticoagulant therapy    Lump of right breast    Kidney stones    HPV in female    History of radiation therapy    History of gestational diabetes    History of chemotherapy    History of anemia    Hemorrhoids    GERD (gastroesophageal reflux disease)    Fistula of intestine    Colon polyps    Cervical lymphadenitis    Bilateral epiphora    Family history of colon cancer    Neck pain on left side    Partial small bowel obstruction    History of pulmonary embolism          Medications:  Current Outpatient Medications on File Prior to Visit   Medication Sig Dispense Refill    clonazePAM (KlonoPIN) 1 MG tablet TAKE 1 TABLET BY MOUTH THREE TIMES A DAY AS NEEDED FOR ANXIETY 90 tablet 0    Cyanocobalamin (VITAMIN B-12 PO) Take 1 tablet by mouth Every Other Day.      dicyclomine (BENTYL) 20 MG tablet Take 1 tablet by mouth Every 6 (Six) Hours As Needed (for irritable bowel symptoms). Indications: Irritable Bowel Syndrome 360 tablet 2    diphenoxylate-atropine (LOMOTIL) 2.5-0.025 MG per tablet TAKE 2 TABLETS BY MOUTH 4 TIMES A  tablet 2    Enoxaparin Sodium (LOVENOX) 100 MG/ML solution prefilled syringe syringe INJECT 1 ML UNDER THE SKIN INTO THE APPROPRIATE AREA AS DIRECTED EVERY 12 (TWELVE) HOURS. 60 mL 2    escitalopram (LEXAPRO) 10 MG tablet TAKE 1 TABLET BY MOUTH EVERY DAY 90 tablet 0    famotidine (PEPCID) 20 MG tablet TAKE 1 TABLET BY MOUTH TWICE A  tablet 1    Imvexxy Maintenance Pack 10 MCG insert Insert 10 mcg into the vagina 2 (Two) Times a Week.      Loperamide HCl (IMODIUM PO) Take  by mouth.      Loratadine 10 MG capsule Take 1 capsule by mouth Every Evening.      metoprolol succinate XL (TOPROL-XL) 25 MG 24 hr tablet TAKE 0.5 TABLETS BY MOUTH EVERY NIGHT 45 tablet 0    ondansetron (ZOFRAN) 8 MG tablet TAKE 1 TABLET BY MOUTH THREE TIMES A DAY AS NEEDED FOR NAUSEA AND VOMITING 60  "tablet 1    promethazine (PHENERGAN) 25 MG tablet Take 1 tablet by mouth Every 6 (Six) Hours As Needed for Nausea or Vomiting. 60 tablet 2     No current facility-administered medications on file prior to visit.          Allergies:  No Known Allergies      Family History:  Family History   Problem Relation Age of Onset    Hyperlipidemia Mother     Colon polyps Mother     Heart disease Mother     Hypertension Mother     Factor V Leiden deficiency Mother     Skin cancer Father         Squamous in 60s    Atrial fibrillation Father     Drug abuse Sister     Mental illness Sister         Addiction    No Known Problems Son     Colon cancer Maternal Uncle     Cancer Paternal Uncle     Colon cancer Paternal Uncle     Diabetes Paternal Uncle     Heart disease Paternal Grandfather     Cancer Paternal Aunt         OVARIAN    Malig Hyperthermia Neg Hx            Social History:  Social History     Socioeconomic History    Marital status:      Spouse name: Justus    Number of children: 1    Years of education: College   Tobacco Use    Smoking status: Every Day     Packs/day: 1.00     Years: 25.00     Additional pack years: 0.00     Total pack years: 25.00     Types: Electronic Cigarette, Cigarettes     Passive exposure: Current    Smokeless tobacco: Never    Tobacco comments:     1 PPD/caffeine use    Vaping Use    Vaping Use: Some days    Substances: Nicotine    Devices: Disposable    Passive vaping exposure: Yes   Substance and Sexual Activity    Alcohol use: Not Currently     Comment: RARELY    Drug use: Never    Sexual activity: Yes     Partners: Male     Comment: .         Distance From Clinic: <30 minutes          Review of Systems:    Review of Systems      Vital Signs:  /72   Pulse 80   Wt 88.5 kg (195 lb)   SpO2 100%   BMI 31.49 kg/m²   Estimated body mass index is 31.49 kg/m² as calculated from the following:    Height as of 11/17/23: 167.6 cm (65.98\").    Weight as of this encounter: 88.5 kg " (195 lb).  Pain Score    11/21/23 1329   PainSc: 0-No pain         ECOG: Fully active, able to carry on all pre-disease performance without restriction = 0    Physical Exam  Constitutional:       General: She is not in acute distress.  HENT:      Head: Normocephalic and atraumatic.   Pulmonary:      Effort: Pulmonary effort is normal. No respiratory distress.   Neurological:      Mental Status: She is alert and oriented to person, place, and time. Mental status is at baseline.   Psychiatric:         Mood and Affect: Mood normal.         Behavior: Behavior normal.            Result Review :             There are no diagnoses linked to this encounter.    Assessment:    Carole Weaver is a 43 y.o. female with history of stage IIIb, [id number], rectal adenocarcinoma diagnosed in 2019 sp neoadjuvant chemoradiation followed by surgical resection of the rectal primary on 12/2/2019.  Staging at time of surgery was stage IIIB, [id number], rectal adenocarcinoma.  At time of diagnosis the patient had an 8 mm hypermetabolic right upper lobe pulmonary nodule suspicious for metastasis.    Plan:    Orders:  - CT simulation  - PFTs    We reviewed the patient's initial diagnosis, treatment, and interval resolution and redevelopment of pulmonary nodules concerning for metastatic disease.  We discussed the recent chest CT and PET/CT findings concerning for metastatic nodules in both lungs.  In particular, there are a few larger nodules in both the right upper and left upper lobe lung.  We discussed the recent tumor board recommendations.  Biopsy is not recommended at this time due to the small size of these lesions.  SBRT of the 2 larger lesions in both the left upper lobe and right upper lobe was recommended in hopes to provide the patient with a longer break from systemic therapy.  The rationale for radiation therapy was explained.  Potential acute and late side effects of treatment was explained.  We discussed that radiation therapy is  planned and delivered.  We discussed potential acute and late side effects of treatment.  The patient was able to ask questions and have them answered to her apparent satisfaction.  She agrees to proceed as discussed.       I spent 60 minutes caring for Carole on this date of service. This time includes time spent by me in the following activities:preparing for the visit, reviewing tests, obtaining and/or reviewing a separately obtained history, performing a medically appropriate examination and/or evaluation , counseling and educating the patient/family/caregiver, ordering medications, tests, or procedures, referring and communicating with other health care professionals , documenting information in the medical record, and independently interpreting results and communicating that information with the patient/family/caregiver  Follow Up   No follow-ups on file.  Patient was given instructions and counseling regarding her condition or for health maintenance advice. Please see specific information pulled into the AVS if appropriate.     Alverto Stephen MD

## 2023-11-27 ENCOUNTER — PATIENT OUTREACH (OUTPATIENT)
Dept: OTHER | Facility: HOSPITAL | Age: 43
End: 2023-11-27
Payer: COMMERCIAL

## 2023-11-27 DIAGNOSIS — C20 ADENOCARCINOMA OF RECTUM: Primary | Chronic | ICD-10-CM

## 2023-11-28 PROCEDURE — 77334 RADIATION TREATMENT AID(S): CPT | Performed by: INTERNAL MEDICINE

## 2023-11-29 ENCOUNTER — TELEPHONE (OUTPATIENT)
Dept: OTHER | Facility: HOSPITAL | Age: 43
End: 2023-11-29
Payer: COMMERCIAL

## 2023-11-29 NOTE — TELEPHONE ENCOUNTER
Oncology Social Work  ..Distress Screening Follow-up    Diagnosis:     Adenocarcinoma of rectum            Malignant neoplasm metastatic to both lung    Treatment: Radiation tx.     Location of Distress Screening: Radiation Oncology    Distress Level: 4 (11/21/2023  1:33 PM)    Physical Concerns:    Sleep: Y    Practical Problems:       Emotional Concerns:       Family Concerns:       Spiritual Concerns:        Interventions:   OSW called and spoke to patient via telephone.  Patient states that she is coping best she can considering.  Upset that she has to go through radiation treatments. Has family support and reports no transportation or financial assistance needed.  Offered supportive counseling or referrals to support groups - patient declined at this time.   Will remain available if needs arise     Lurdes Tinoco, JAYMEW, LCSW

## 2023-12-01 ENCOUNTER — PATIENT OUTREACH (OUTPATIENT)
Dept: OTHER | Facility: HOSPITAL | Age: 43
End: 2023-12-01
Payer: COMMERCIAL

## 2023-12-01 DIAGNOSIS — F41.9 ANXIETY: ICD-10-CM

## 2023-12-01 RX ORDER — METOPROLOL SUCCINATE 25 MG/1
12.5 TABLET, EXTENDED RELEASE ORAL NIGHTLY
Qty: 45 TABLET | Refills: 2 | Status: SHIPPED | OUTPATIENT
Start: 2023-12-01

## 2023-12-01 NOTE — TELEPHONE ENCOUNTER
Rx Refill Note  Requested Prescriptions     Pending Prescriptions Disp Refills    clonazePAM (KlonoPIN) 1 MG tablet [Pharmacy Med Name: CLONAZEPAM 1 MG TABLET] 90 tablet 0     Sig: TAKE 1 TABLET BY MOUTH THREE TIMES A DAY AS NEEDED FOR ANXIETY      Last office visit with prescribing clinician: 9/13/2023   Next office visit with prescribing clinician: 12/20/2023         Erika Thakur MA  12/01/23, 13:05 EST

## 2023-12-01 NOTE — PROGRESS NOTES
Nurse Initial Navigator Assessment:  Received referral from Dr. Stephen to follow patient regarding diagnosis and psychosocial support. Call placed to patient to complete initial assessment. Introduced self and explained role. Patient has diagnosis of metastatic malignant neoplasm to both lungs. She has h/o stage IIIb rectal cancer and is s/p neoadjuvant chemoradiation followed by surgical resection on 12/2/19. Recent CT chest and PET/CT showed bilateral pulmonary nodules likely representing metastatic disease and development of hypermetabolic pulmonary nodules concerning for metastatic disease. Patient met with Dr. Nguyen and Dr. Stephen to discuss plan of care and treatment plan. She is scheduled to start SBRT of two large lesions in both the NOE and RUL x5 fraction on 12/7/23. She is also scheduled for pulmonary function testing on 12/6/23. Briefly discussed PFTs and what to expect. Patient able to verbalize understanding regarding plan of care and treatment plan at this time. No additional concerns or questions voiced.    Patient lives with her  Justus and her 25-year-old son. States she has a great support system. Denies difficulty with affording medications or transportation. Patient IADLs and mobility. No complaints voiced. States sleep is interrupted due to getting up frequently to go to bathroom. Says she had an ostomy reversal last year and continues to work on bowel program. No additional concerns voiced or psychosocial needs noted at this time.   Medical/Psychiatric History   Cancer Diagnosis: metastatic malignant neoplasm to both lungs; h/o stage IIIb rectal cancer; s/p neoadjuvant chemoradiation followed by surgical resection on 12/2/19; scheduled to start SBRT of two large lesions in both the NOE and RUL x5 fraction on 12/7/23  Psychiatric History: anxiety  Past Medications: Lexapro & Klonopin  Currently seeing Mental Health Professional: No  Medication/Counseling Interest? N/A  Social  History  Support Community: Yes  Substance Use: smoking--yes (1/PPD x25 years); ETOH--not currently; Drugs---no   Family Psychiatric: Unknown   Impactful cancer experience: father---skin cancer; paternal uncle & maternal uncle---colon cancer; paternal aunt---ovarian cancer; maternal great aunt---breast cancer  Plan of Care  Discussed support services offered at the [facility] and in the community. Patient does not have an advanced directive. Provided navigation letter; advanced care planning; smoking cessation; Cancer Care Supportive Services; NCCN patient guidelines for rectal cancer; [employer]; [employer] and online resources. No additional needs noted at this time. Encouraged patient to call with any questions or concerns. Will continue to follow…Catalina Redman RN, Oncology Nurse Navigator

## 2023-12-04 ENCOUNTER — HOSPITAL ENCOUNTER (OUTPATIENT)
Dept: RADIATION ONCOLOGY | Facility: HOSPITAL | Age: 43
Setting detail: RADIATION/ONCOLOGY SERIES
End: 2023-12-04
Payer: COMMERCIAL

## 2023-12-04 ENCOUNTER — HOSPITAL ENCOUNTER (OUTPATIENT)
Facility: HOSPITAL | Age: 43
Setting detail: HOSPITAL OUTPATIENT SURGERY
Discharge: HOME | End: 2023-12-04
Attending: COLON & RECTAL SURGERY | Admitting: COLON & RECTAL SURGERY
Payer: COMMERCIAL

## 2023-12-04 ENCOUNTER — ANESTHESIA EVENT (OUTPATIENT)
Dept: GASTROENTEROLOGY | Facility: HOSPITAL | Age: 43
End: 2023-12-04
Payer: COMMERCIAL

## 2023-12-04 ENCOUNTER — ANESTHESIA (OUTPATIENT)
Dept: GASTROENTEROLOGY | Facility: HOSPITAL | Age: 43
End: 2023-12-04
Payer: COMMERCIAL

## 2023-12-04 VITALS
WEIGHT: 190 LBS | HEIGHT: 66 IN | SYSTOLIC BLOOD PRESSURE: 119 MMHG | OXYGEN SATURATION: 96 % | HEART RATE: 89 BPM | DIASTOLIC BLOOD PRESSURE: 81 MMHG | BODY MASS INDEX: 30.53 KG/M2 | RESPIRATION RATE: 16 BRPM

## 2023-12-04 DIAGNOSIS — Z85.048 HISTORY OF RECTAL CANCER: ICD-10-CM

## 2023-12-04 LAB
RAD ONC ARIA COURSE END DATE: NORMAL
RAD ONC ARIA COURSE ID: NORMAL
RAD ONC ARIA COURSE INTENT: NORMAL
RAD ONC ARIA COURSE LAST TREATMENT DATE: NORMAL
RAD ONC ARIA COURSE START DATE: NORMAL
RAD ONC ARIA COURSE TREATMENT ELAPSED DAYS: 38
RAD ONC ARIA FIRST TREATMENT DATE: NORMAL
RAD ONC ARIA PLAN FRACTIONS TREATED TO DATE: 2
RAD ONC ARIA PLAN FRACTIONS TREATED TO DATE: 23
RAD ONC ARIA PLAN FRACTIONS TREATED TO DATE: 3
RAD ONC ARIA PLAN ID: NORMAL
RAD ONC ARIA PLAN NAME: NORMAL
RAD ONC ARIA PLAN PRESCRIBED DOSE PER FRACTION: 1.8 GY
RAD ONC ARIA PLAN PRIMARY REFERENCE POINT: NORMAL
RAD ONC ARIA PLAN TOTAL FRACTIONS PRESCRIBED: 2
RAD ONC ARIA PLAN TOTAL FRACTIONS PRESCRIBED: 23
RAD ONC ARIA PLAN TOTAL FRACTIONS PRESCRIBED: 3
RAD ONC ARIA PLAN TOTAL PRESCRIBED DOSE: 4140 CGY
RAD ONC ARIA PLAN TOTAL PRESCRIBED DOSE: 4500 CGY
RAD ONC ARIA PLAN TOTAL PRESCRIBED DOSE: 540 CGY
RAD ONC ARIA REFERENCE POINT DOSAGE GIVEN TO DATE: 43.66 GY
RAD ONC ARIA REFERENCE POINT DOSAGE GIVEN TO DATE: 50.4 GY
RAD ONC ARIA REFERENCE POINT ID: NORMAL
RAD ONC ARIA REFERENCE POINT ID: NORMAL

## 2023-12-04 PROCEDURE — 77293 RESPIRATOR MOTION MGMT SIMUL: CPT | Performed by: INTERNAL MEDICINE

## 2023-12-04 PROCEDURE — 25010000002 PROPOFOL 10 MG/ML EMULSION: Performed by: NURSE ANESTHETIST, CERTIFIED REGISTERED

## 2023-12-04 PROCEDURE — 77338 DESIGN MLC DEVICE FOR IMRT: CPT | Performed by: INTERNAL MEDICINE

## 2023-12-04 PROCEDURE — 77300 RADIATION THERAPY DOSE PLAN: CPT | Performed by: INTERNAL MEDICINE

## 2023-12-04 PROCEDURE — 77301 RADIOTHERAPY DOSE PLAN IMRT: CPT | Performed by: INTERNAL MEDICINE

## 2023-12-04 PROCEDURE — 25010000002 ONDANSETRON PER 1 MG: Performed by: NURSE ANESTHETIST, CERTIFIED REGISTERED

## 2023-12-04 PROCEDURE — 88305 TISSUE EXAM BY PATHOLOGIST: CPT | Performed by: COLON & RECTAL SURGERY

## 2023-12-04 PROCEDURE — 25810000003 LACTATED RINGERS PER 1000 ML: Performed by: COLON & RECTAL SURGERY

## 2023-12-04 RX ORDER — PROPOFOL 10 MG/ML
VIAL (ML) INTRAVENOUS AS NEEDED
Status: DISCONTINUED | OUTPATIENT
Start: 2023-12-04 | End: 2023-12-04 | Stop reason: SURG

## 2023-12-04 RX ORDER — SODIUM CHLORIDE, SODIUM LACTATE, POTASSIUM CHLORIDE, CALCIUM CHLORIDE 600; 310; 30; 20 MG/100ML; MG/100ML; MG/100ML; MG/100ML
30 INJECTION, SOLUTION INTRAVENOUS CONTINUOUS
Status: DISCONTINUED | OUTPATIENT
Start: 2023-12-04 | End: 2023-12-04 | Stop reason: HOSPADM

## 2023-12-04 RX ORDER — ONDANSETRON 2 MG/ML
INJECTION INTRAMUSCULAR; INTRAVENOUS AS NEEDED
Status: DISCONTINUED | OUTPATIENT
Start: 2023-12-04 | End: 2023-12-04 | Stop reason: SURG

## 2023-12-04 RX ORDER — LIDOCAINE HYDROCHLORIDE 20 MG/ML
INJECTION, SOLUTION INFILTRATION; PERINEURAL AS NEEDED
Status: DISCONTINUED | OUTPATIENT
Start: 2023-12-04 | End: 2023-12-04 | Stop reason: SURG

## 2023-12-04 RX ADMIN — PROPOFOL 100 MG: 10 INJECTION, EMULSION INTRAVENOUS at 10:24

## 2023-12-04 RX ADMIN — ONDANSETRON 4 MG: 2 INJECTION INTRAMUSCULAR; INTRAVENOUS at 10:26

## 2023-12-04 RX ADMIN — LIDOCAINE HYDROCHLORIDE 60 MG: 20 INJECTION, SOLUTION INFILTRATION; PERINEURAL at 10:24

## 2023-12-04 RX ADMIN — PROPOFOL 200 MCG/KG/MIN: 10 INJECTION, EMULSION INTRAVENOUS at 10:25

## 2023-12-04 RX ADMIN — SODIUM CHLORIDE, POTASSIUM CHLORIDE, SODIUM LACTATE AND CALCIUM CHLORIDE 30 ML/HR: 600; 310; 30; 20 INJECTION, SOLUTION INTRAVENOUS at 10:17

## 2023-12-04 NOTE — H&P
Carole Weaver is a 43 y.o. female  who is referred by Geronimo Ye MD for a colonoscopy. She   has an indications: ho of rectal cancer.     She denies any change in bowel function, melena, or hematochezia.    Past Medical History:   Diagnosis Date    Abdominopelvic abscess 08/12/2020    ADMITTED TO [facility]    Abnormal Pap smear of cervix 02/02/1998    JULIUS I    Abscess of abdominal wall 11/28/2012    SEEN AT [facility] ER    Anemia in pregnancy     Anxiety     Bilateral epiphora 04/2020    Bilateral hand swelling 05/02/2020    SEEN AT [facility] ER    Cervical lymphadenitis 06/06/2012    SEEN AT [facility] ER    Chemotherapy induced diarrhea 01/2021    Chemotherapy induced neutropenia 04/2020    Chemotherapy-induced nausea 02/2020    Chemotherapy-induced thrombocytopenia 05/2020    Chronic anticoagulation     Chronic diarrhea     Colon polyps     FOLLOWED BY DR. 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 ALEXANDRU HUNT    History of gestational diabetes     History of pre-eclampsia 1998    History of pre-eclampsia     History of radiation therapy 2019    FOLLOWED BY DR. JAVON LEWIS    History of TB skin testing 01/17/2009    TB Skin Test    History of TB skin testing 01/17/2009    TB Skin Test    History of transfusion 2019    12/2019    HPV in female 1998    Hypokalemia 09/2019    Hypomagnesemia 09/2019    Hyponatremia 06/2021    IBS (irritable bowel syndrome)      Ileostomy present 2020    Take down on 11/3/2022    Infected insect bite of neck 2016    SEEN AT [facility]    Kidney stones 2007    SEEN AT [facility] ER    Low anterior resection syndrome 2022    FOLLOWED BY DR. PADMAJA ESPARZA    Lump of right breast     SWOLLEN LYMPH NODE    Lung cancer 2020    METASTATIC LUNG CANCER    Macrocytosis 2021    Monopolar depression     On anticoagulant therapy     Pain associated with surgical procedure 2020    Palmar-plantar erythrodysesthesia 2021    Perirectal abscess 2020    Pulmonary embolism without acute cor pulmonale 09/20/2019    X 3; ADMITTED TO [facility]    Pulmonary nodule, right 2020    Rectal cancer 2019    STAGE IIA, INVASIVE MODERATELY DIFFERENTIATED ADENOCARCINOMA, CHEMO AND XRT FINISHED 2019    Right shoulder pain 2020    SEEN AT [facility] ER    Right ureteral stone 10/01/2019    SEEN AT [facility] ER    SAB (spontaneous ) 1996     A2-1 INDUCED    Sciatica     Sepsis due to cellulitis 2002    LEFT GREAT TOE, ADMITTED TO [facility]    Tachycardia 2020    Thrombosis of superior vena cava 2020    AND BRACHIOCEPHALIC VEIN, ADMITTED TO [facility]    Urinary urgency 2020       Past Surgical History:   Procedure Laterality Date    ABDOMINAL SURGERY      CHOLECYSTECTOMY N/A 10/10/2003    LAPAROSCOPIC WITH CHOLANGIOGRAM, DR. JAMEY TALAVERA AT [facility]    COLON RESECTION N/A 2019    Procedure: laparoscopic low anterior colon resection with mobilization of splenic flexure and diverting loop ileostomy: ERAS;  Surgeon: Padmaja Esparza MD;  Location: [facility] MAIN OR;  Service: General    COLON RESECTION N/A 2020    Procedure: LOW ANTERIOR COLON RESECTION, COLOANAL ANASTOMOSIS, MOBILIZATION SPLENIC FLEXURE;  Surgeon: Padmaja Esparza MD;  Location: [facility] MAIN OR;  Service: General;  Laterality: N/A;    COLONOSCOPY N/A 07/15/2020    PATENT ANASTAMOSIS IN MID RECTUM, RESCOPE IN 1 YR, DR. PADMAJA ESPARZA AT [facility]     COLONOSCOPY N/A 11/03/2022    DIFFUSE AREA OF MODERATELY FRIABLE MUCOSA IN ENTIRE COLON , DIVERSION COLITIS, PATENT AND HEALTHY ANASTAMOSIS, DR. GERONIMO YE AT [facility]    COLONOSCOPY W/ POLYPECTOMY N/A 07/08/2019    15 MM TUBULOVILLOUS ADENOMA POLYP IN HEPATIC FLEXURE, 20 MMTUBULOVILLOUS ADENOMA WITH HIGH GRADE DYSPLASIA IN RECTOSIGMOID, 4 CM MASS IN MID RECTUM, PATH: INVASIVE MODERATELY DIFFERENTIATED ADENOCARCINOMA, DR. JENNIFER LI AT [facility]    DILATATION AND EVACUATION N/A 2009    ENDOSCOPY N/A 07/08/2019    LA GRADE A ESOPHAGITIS, GASTRITIS, ALL BIOPSIES BENIGN, DR. JENNIFER LI AT [facility]    ILEOSTOMY CLOSURE N/A 11/14/2022    Procedure: ILEOSTOMY TAKEDOWN;  Surgeon: Geronimo Ye MD;  Location: [facility] OR;  Service: General;  Laterality: N/A;    INCISION AND DRAINAGE PERIRECTAL ABSCESS N/A 08/14/2020    Procedure: INCISION AND DRAINAGE OF retrorectal dehiscence abcess with drain placement and irrigation;  Surgeon: Geronimo Ye MD;  Location: [facility] MAIN OR;  Service: General;  Laterality: N/A;    PAP SMEAR N/A 01/24/2014    SIGMOIDOSCOPY N/A 07/24/2019    INFILTRATIVE PARTIALLY OBSTRUCTING LARGE RECTAL CANCER, AREA TATOOED, DR. GERONIMO YE AT [facility]    SIGMOIDOSCOPY N/A 11/23/2019    AN ULCERATED PARTIALLY OBSTRUCTING MASS IN MID RECTUM, AREA TATTOOED, DR. GERONIMO YE AT [facility]    SIGMOIDOSCOPY N/A 07/22/2021    PATENT ANASTAMOSIS IN DISTAL RECTUM, RESCOPE IN 1 YR, DR. GERONIMO YE AT [facility]    TRANSRECTAL ULTRASOUND N/A 07/24/2019    Procedure: ULTRASOUND TRANSRECTAL;  Surgeon: Geronimo Ye MD;  Location: [facility] ENDOSCOPY;  Service: General    URETEROSCOPY LASER LITHOTRIPSY WITH STENT INSERTION Right 10/30/2019    DR. ESTUARDO BEASLEY AT [place]    VAGINAL DELIVERY N/A 09/18/1998    VENOUS ACCESS DEVICE (PORT) INSERTION Left 08/09/2019    Procedure: INSERTION VENOUS ACCESS DEVICE;  Surgeon: Sj Joseph MD;  Location: [facility] OR OSC;  Service: General    VENOUS ACCESS  DEVICE (PORT) INSERTION N/A 12/18/2020    Procedure: INSERTION VENOUS ACCESS DEVICE right side, removal venous access device left side;  Surgeon: Sj Joseph MD;  [facility];  Service: General;  Laterality: N/A;    WISDOM TOOTH EXTRACTION Bilateral 1993       Medications Prior to Admission   Medication Sig Dispense Refill Last Dose    clonazePAM (KlonoPIN) 1 MG tablet TAKE 1 TABLET BY MOUTH THREE TIMES A DAY AS NEEDED FOR ANXIETY 90 tablet 0 12/2/2023    Cyanocobalamin (VITAMIN B-12 PO) Take 1 tablet by mouth Every Other Day.       dicyclomine (BENTYL) 20 MG tablet Take 1 tablet by mouth Every 6 (Six) Hours As Needed (for irritable bowel symptoms). Indications: Irritable Bowel Syndrome 360 tablet 2     diphenoxylate-atropine (LOMOTIL) 2.5-0.025 MG per tablet TAKE 2 TABLETS BY MOUTH 4 TIMES A  tablet 2     Enoxaparin Sodium (LOVENOX) 100 MG/ML solution prefilled syringe syringe INJECT 1 ML UNDER THE SKIN INTO THE APPROPRIATE AREA AS DIRECTED EVERY 12 (TWELVE) HOURS. (Patient taking differently: Inject 1 mg/kg under the skin into the appropriate area as directed Every 12 (Twelve) Hours. WAS NOT TOLD TO HOLD) 60 mL 2 12/3/2023 at 2000    escitalopram (LEXAPRO) 10 MG tablet TAKE 1 TABLET BY MOUTH EVERY DAY 90 tablet 0 12/2/2023    famotidine (PEPCID) 20 MG tablet TAKE 1 TABLET BY MOUTH TWICE A  tablet 1 12/2/2023    Imvexxy Maintenance Pack 10 MCG insert Insert 10 mcg into the vagina 2 (Two) Times a Week.       Loperamide HCl (IMODIUM PO) Take  by mouth As Needed.       Loratadine 10 MG capsule Take 1 capsule by mouth Every Evening.       metoprolol succinate XL (TOPROL-XL) 25 MG 24 hr tablet TAKE 0.5 TABLETS BY MOUTH EVERY NIGHT 45 tablet 2 12/2/2023    ondansetron (ZOFRAN) 8 MG tablet TAKE 1 TABLET BY MOUTH THREE TIMES A DAY AS NEEDED FOR NAUSEA AND VOMITING 60 tablet 1     promethazine (PHENERGAN) 25 MG tablet Take 1 tablet by mouth Every 6 (Six) Hours As Needed for Nausea or  Vomiting. 60 tablet 2        No Known Allergies    Family History   Problem Relation Age of Onset    Hyperlipidemia Mother     Colon polyps Mother     Heart disease Mother     Hypertension Mother     Factor V Leiden deficiency Mother     Skin cancer Father         Squamous in 60s    Atrial fibrillation Father     Drug abuse Sister     Mental illness Sister         Addiction    No Known Problems Son     Colon cancer Maternal Uncle     Cancer Paternal Uncle     Colon cancer Paternal Uncle     Diabetes Paternal Uncle     Heart disease Paternal Grandfather     Cancer Paternal Aunt         OVARIAN    Malig Hyperthermia Neg Hx        Social History     Socioeconomic History    Marital status:      Spouse name: Justus    Number of children: 1    Years of education: College   Tobacco Use    Smoking status: Every Day     Packs/day: 1.00     Years: 25.00     Additional pack years: 0.00     Total pack years: 25.00     Types: Electronic Cigarette, Cigarettes     Passive exposure: Current    Smokeless tobacco: Never    Tobacco comments:     1 PPD/caffeine use    Vaping Use    Vaping Use: Some days    Substances: Nicotine    Devices: Disposable    Passive vaping exposure: Yes   Substance and Sexual Activity    Alcohol use: Not Currently     Comment: RARELY    Drug use: Never    Sexual activity: Yes     Partners: Male     Comment: .       Review of Systems   Gastrointestinal:  Negative for abdominal pain, nausea and vomiting.   All other systems reviewed and are negative.      Vitals:    12/04/23 1012   BP: 138/91   Pulse: 101   Resp: 20   SpO2: 95%         Physical Exam  Constitutional:       Appearance: She is well-developed.   HENT:      Head: Normocephalic and atraumatic.   Eyes:      Pupils: Pupils are equal, round, and reactive to light.   Cardiovascular:      Rate and Rhythm: Regular rhythm.   Pulmonary:      Effort: Pulmonary effort is normal.   Abdominal:      General: There is no distension.       Palpations: Abdomen is soft.   Musculoskeletal:         General: Normal range of motion.   Skin:     General: Skin is warm and dry.   Neurological:      Mental Status: She is alert and oriented to person, place, and time.   Psychiatric:         Thought Content: Thought content normal.         Judgment: Judgment normal.           Assessment & Plan      indications:    Ho Rectal cancer      I recommend colonoscopy.  I described risks, benefits of the procedure with the patient including but not limited to bleeding, infection, possibility of perforation and possible polypectomy. All of the patient's questions were answered and they would like to proceed with the above recommendations.

## 2023-12-04 NOTE — ANESTHESIA PREPROCEDURE EVALUATION
Anesthesia Evaluation     Patient summary reviewed and Nursing notes reviewed                Airway   Mallampati: II  TM distance: >3 FB  Neck ROM: full  Dental      Pulmonary - negative pulmonary ROS   (+) pulmonary embolism, a smoker Current, lung cancer, COPD,  Cardiovascular - negative cardio ROS    ECG reviewed  PT is on anticoagulation therapy  Patient on routine beta blocker  Rhythm: regular  Rate: normal    (+) hypertension, valvular problems/murmurs, CAD      Neuro/Psych- negative ROS  (+) numbness, psychiatric history  GI/Hepatic/Renal/Endo - negative ROS   (+) GERD, GI bleeding , renal disease- stones    Musculoskeletal (-) negative ROS    (+) neck pain  Abdominal    Substance History - negative use     OB/GYN negative ob/gyn ROS         Other      history of cancer                  Anesthesia Plan    ASA 3     MAC     intravenous induction     Anesthetic plan, risks, benefits, and alternatives have been provided, discussed and informed consent has been obtained with: patient.    Plan discussed with CRNA.    CODE STATUS:

## 2023-12-04 NOTE — ANESTHESIA POSTPROCEDURE EVALUATION
Patient: Carole Weaver    Procedure Summary       Date: 12/04/23 Room / Location: [facility] ENDOSCOPY 1 /  [facility]    Anesthesia Start: 1020 Anesthesia Stop: 1044    Procedure: COLONOSCOPY to cecum with hot snare polypectomy Diagnosis:       History of rectal cancer      (History of rectal cancer [Z85.048])    Surgeons: Padmaja Ye MD Provider: Ravi Bryan MD    Anesthesia Type: MAC ASA Status: 3            Anesthesia Type: MAC    Vitals  Vitals Value Taken Time   /81 12/04/23 1103   Temp     Pulse 86 12/04/23 1105   Resp 16 12/04/23 1052   SpO2 96 % 12/04/23 1105   Vitals shown include unfiled device data.        Post Anesthesia Care and Evaluation    Patient location during evaluation: PACU  Patient participation: complete - patient participated  Level of consciousness: awake and alert  Pain management: adequate    Airway patency: patent  Anesthetic complications: No anesthetic complications    Cardiovascular status: acceptable  Respiratory status: acceptable  Hydration status: acceptable    Comments: --------------------            12/04/23               1052     --------------------   BP:       110/68     Pulse:      90       Resp:       16       SpO2:      92%      --------------------

## 2023-12-05 ENCOUNTER — TELEPHONE (OUTPATIENT)
Dept: ONCOLOGY | Facility: CLINIC | Age: 43
End: 2023-12-05

## 2023-12-05 LAB
LAB AP CASE REPORT: NORMAL
PATH REPORT.FINAL DX SPEC: NORMAL
PATH REPORT.GROSS SPEC: NORMAL

## 2023-12-05 RX ORDER — CLONAZEPAM 1 MG/1
TABLET ORAL
Qty: 45 TABLET | Refills: 0 | Status: SHIPPED | OUTPATIENT
Start: 2023-12-05

## 2023-12-05 NOTE — TELEPHONE ENCOUNTER
Caller: Carole Weaver    Relationship: Self    Best call back number: [phone]     What is the best time to reach you: ASAP    Who are you requesting to speak with (clinical staff, provider,  specific staff member): SCHEDULING    What was the call regarding: PLEASE CALL PT BACK TO SCHEDULE A PORT FLUSH THIS FRIDAY AROUND 11:00 IF POSSIBLE, 12/8/23, AND THEN WILL NEED MORE PORT FLUSHES SCHEDULED AT 4 WEEK INTERVALS AFTER THAT.

## 2023-12-06 ENCOUNTER — HOSPITAL ENCOUNTER (OUTPATIENT)
Dept: BONE DENSITY | Facility: HOSPITAL | Age: 43
Discharge: HOME | End: 2023-12-06
Payer: COMMERCIAL

## 2023-12-06 ENCOUNTER — HOSPITAL ENCOUNTER (OUTPATIENT)
Dept: RESPIRATORY THERAPY | Facility: HOSPITAL | Age: 43
Discharge: HOME | End: 2023-12-06
Payer: COMMERCIAL

## 2023-12-06 DIAGNOSIS — C78.02 MALIGNANT NEOPLASM METASTATIC TO BOTH LUNGS: ICD-10-CM

## 2023-12-06 DIAGNOSIS — Z92.21 HISTORY OF CHEMOTHERAPY: ICD-10-CM

## 2023-12-06 DIAGNOSIS — C78.01 MALIGNANT NEOPLASM METASTATIC TO BOTH LUNGS: ICD-10-CM

## 2023-12-06 DIAGNOSIS — C20 ADENOCARCINOMA OF RECTUM: Chronic | ICD-10-CM

## 2023-12-06 LAB
BDY SITE: NORMAL
CALCULATED HEMOGLOBIN, EPOC: NORMAL
HGB BLDA-MCNC: 14.3 G/DL (ref 12–18)
Lab: NORMAL

## 2023-12-06 PROCEDURE — 94726 PLETHYSMOGRAPHY LUNG VOLUMES: CPT

## 2023-12-06 PROCEDURE — 94060 EVALUATION OF WHEEZING: CPT

## 2023-12-06 PROCEDURE — 77080 DXA BONE DENSITY AXIAL: CPT

## 2023-12-06 PROCEDURE — 94729 DIFFUSING CAPACITY: CPT

## 2023-12-06 PROCEDURE — 82820 HEMOGLOBIN-OXYGEN AFFINITY: CPT | Performed by: INTERNAL MEDICINE

## 2023-12-06 RX ORDER — ALBUTEROL SULFATE 2.5 MG/3ML
2.5 SOLUTION RESPIRATORY (INHALATION) ONCE AS NEEDED
Status: COMPLETED | OUTPATIENT
Start: 2023-12-06 | End: 2023-12-06

## 2023-12-06 RX ADMIN — ALBUTEROL SULFATE 2.5 MG: 2.5 SOLUTION RESPIRATORY (INHALATION) at 15:22

## 2023-12-07 ENCOUNTER — RADIATION ONCOLOGY WEEKLY ASSESSMENT (OUTPATIENT)
Dept: RADIATION ONCOLOGY | Facility: HOSPITAL | Age: 43
End: 2023-12-07
Payer: COMMERCIAL

## 2023-12-07 ENCOUNTER — HOSPITAL ENCOUNTER (OUTPATIENT)
Dept: RADIATION ONCOLOGY | Facility: HOSPITAL | Age: 43
Discharge: HOME | End: 2023-12-07

## 2023-12-07 VITALS
BODY MASS INDEX: 31.96 KG/M2 | OXYGEN SATURATION: 97 % | DIASTOLIC BLOOD PRESSURE: 76 MMHG | SYSTOLIC BLOOD PRESSURE: 118 MMHG | WEIGHT: 198 LBS | HEART RATE: 88 BPM

## 2023-12-07 DIAGNOSIS — C78.01 MALIGNANT NEOPLASM METASTATIC TO BOTH LUNGS: ICD-10-CM

## 2023-12-07 DIAGNOSIS — C20 ADENOCARCINOMA OF RECTUM: Primary | Chronic | ICD-10-CM

## 2023-12-07 DIAGNOSIS — C78.02 MALIGNANT NEOPLASM METASTATIC TO BOTH LUNGS: ICD-10-CM

## 2023-12-07 LAB
RAD ONC ARIA COURSE ID: NORMAL
RAD ONC ARIA COURSE INTENT: NORMAL
RAD ONC ARIA COURSE LAST TREATMENT DATE: NORMAL
RAD ONC ARIA COURSE START DATE: NORMAL
RAD ONC ARIA COURSE TREATMENT ELAPSED DAYS: 0
RAD ONC ARIA FIRST TREATMENT DATE: NORMAL
RAD ONC ARIA PLAN FRACTIONS TREATED TO DATE: 1
RAD ONC ARIA PLAN FRACTIONS TREATED TO DATE: 1
RAD ONC ARIA PLAN ID: NORMAL
RAD ONC ARIA PLAN ID: NORMAL
RAD ONC ARIA PLAN PRESCRIBED DOSE PER FRACTION: 10 GY
RAD ONC ARIA PLAN PRESCRIBED DOSE PER FRACTION: 10 GY
RAD ONC ARIA PLAN PRIMARY REFERENCE POINT: NORMAL
RAD ONC ARIA PLAN PRIMARY REFERENCE POINT: NORMAL
RAD ONC ARIA PLAN TOTAL FRACTIONS PRESCRIBED: 5
RAD ONC ARIA PLAN TOTAL FRACTIONS PRESCRIBED: 5
RAD ONC ARIA PLAN TOTAL PRESCRIBED DOSE: 5000 CGY
RAD ONC ARIA PLAN TOTAL PRESCRIBED DOSE: 5000 CGY
RAD ONC ARIA REFERENCE POINT DOSAGE GIVEN TO DATE: 10 GY
RAD ONC ARIA REFERENCE POINT DOSAGE GIVEN TO DATE: 10 GY
RAD ONC ARIA REFERENCE POINT ID: NORMAL
RAD ONC ARIA REFERENCE POINT ID: NORMAL
RAD ONC ARIA REFERENCE POINT SESSION DOSAGE GIVEN: 10 GY
RAD ONC ARIA REFERENCE POINT SESSION DOSAGE GIVEN: 10 GY

## 2023-12-07 PROCEDURE — 77373 STRTCTC BDY RAD THER TX DLVR: CPT | Performed by: INTERNAL MEDICINE

## 2023-12-07 PROCEDURE — 77435 SBRT MANAGEMENT: CPT | Performed by: INTERNAL MEDICINE

## 2023-12-07 PROCEDURE — 77386: CPT | Performed by: INTERNAL MEDICINE

## 2023-12-07 NOTE — PROGRESS NOTES
Radiation Oncology  On-Treatment Note      Patient: Carole Weaver    MRN: [id number]    Prescribing Physician: Alverto Stephen MD     Diagnosis:     ICD-10-CM ICD-9-CM   1. Adenocarcinoma of rectum  C20 154.1   2. Malignant neoplasm metastatic to both lungs  C78.01 197.0    C78.02        Radiation Therapy Visit:  Continue radiation therapy, Dosimetry plan remains acceptable, Films reviewed and remains acceptable, Pain assessed, Pain management planned, Radiation dose schedule reviewed and remains acceptable, Radiation technique remains acceptable, and Symptoms within expected range    Radiation Treatments       Active   Plans   LULSBRT 50GY   Most recent treatment: Dose planned: 1,000 cGy (fraction 1 on 12/7/2023)   Total: Dose planned: 5,000 cGy (5 fractions)   Elapsed Days: 0      RULSBRT 50GY   Most recent treatment: Dose planned: 1,000 cGy (fraction 1 on 12/7/2023)   Total: Dose planned: 5,000 cGy (5 fractions)   Elapsed Days: 0      Reference Points   PTVLUL   Most recent treatment: Dose given: 1,000 cGy (on 12/7/2023)   Total: Dose given: 1,000 cGy   Elapsed Days: 0      PTVRUL   Most recent treatment: Dose given: 1,000 cGy (on 12/7/2023)   Total: Dose given: 1,000 cGy   Elapsed Days: 0                      Physical Examination:  Vitals: Blood pressure 118/76, pulse 88, weight 89.8 kg (198 lb), SpO2 97%, not currently breastfeeding.  Pain Score    12/07/23 1551   PainSc: 0-No pain       Fully active, able to carry on all pre-disease performance without restriction = 0    We examined the relevant areas: yes  Findings are within the expected range for this stage of treatment: yes  -------------------------------------------------------------------------------------------------------------------    ACTION ITEMS:  Patient tolerating treatment well and as expected for this stage in their treatment and Continue radiation therapy as planned    Estimated Completion Date: 12/13/2023    Anticipatory guidance provided.      Plan for follow-up after 2/2/2023 (scheduled imaging on 2/2/2023 so follow-up after)      Alverto Stephen MD  Radiation Oncology

## 2023-12-08 ENCOUNTER — INFUSION (OUTPATIENT)
Dept: ONCOLOGY | Facility: HOSPITAL | Age: 43
End: 2023-12-08
Payer: COMMERCIAL

## 2023-12-08 ENCOUNTER — HOSPITAL ENCOUNTER (OUTPATIENT)
Dept: RADIATION ONCOLOGY | Facility: HOSPITAL | Age: 43
Discharge: HOME | End: 2023-12-08

## 2023-12-08 DIAGNOSIS — Z45.2 ENCOUNTER FOR FITTING AND ADJUSTMENT OF VASCULAR CATHETER: Primary | ICD-10-CM

## 2023-12-08 LAB
RAD ONC ARIA COURSE ID: NORMAL
RAD ONC ARIA COURSE INTENT: NORMAL
RAD ONC ARIA COURSE LAST TREATMENT DATE: NORMAL
RAD ONC ARIA COURSE START DATE: NORMAL
RAD ONC ARIA COURSE TREATMENT ELAPSED DAYS: 1
RAD ONC ARIA FIRST TREATMENT DATE: NORMAL
RAD ONC ARIA PLAN FRACTIONS TREATED TO DATE: 2
RAD ONC ARIA PLAN FRACTIONS TREATED TO DATE: 2
RAD ONC ARIA PLAN ID: NORMAL
RAD ONC ARIA PLAN ID: NORMAL
RAD ONC ARIA PLAN PRESCRIBED DOSE PER FRACTION: 10 GY
RAD ONC ARIA PLAN PRESCRIBED DOSE PER FRACTION: 10 GY
RAD ONC ARIA PLAN PRIMARY REFERENCE POINT: NORMAL
RAD ONC ARIA PLAN PRIMARY REFERENCE POINT: NORMAL
RAD ONC ARIA PLAN TOTAL FRACTIONS PRESCRIBED: 5
RAD ONC ARIA PLAN TOTAL FRACTIONS PRESCRIBED: 5
RAD ONC ARIA PLAN TOTAL PRESCRIBED DOSE: 5000 CGY
RAD ONC ARIA PLAN TOTAL PRESCRIBED DOSE: 5000 CGY
RAD ONC ARIA REFERENCE POINT DOSAGE GIVEN TO DATE: 20 GY
RAD ONC ARIA REFERENCE POINT DOSAGE GIVEN TO DATE: 20 GY
RAD ONC ARIA REFERENCE POINT ID: NORMAL
RAD ONC ARIA REFERENCE POINT ID: NORMAL
RAD ONC ARIA REFERENCE POINT SESSION DOSAGE GIVEN: 10 GY
RAD ONC ARIA REFERENCE POINT SESSION DOSAGE GIVEN: 10 GY

## 2023-12-08 PROCEDURE — 96523 IRRIG DRUG DELIVERY DEVICE: CPT

## 2023-12-08 PROCEDURE — 25010000002 HEPARIN LOCK FLUSH PER 10 UNITS: Performed by: INTERNAL MEDICINE

## 2023-12-08 PROCEDURE — 77373 STRTCTC BDY RAD THER TX DLVR: CPT | Performed by: INTERNAL MEDICINE

## 2023-12-08 RX ORDER — SODIUM CHLORIDE 0.9 % (FLUSH) 0.9 %
10 SYRINGE (ML) INJECTION AS NEEDED
OUTPATIENT
Start: 2023-12-08

## 2023-12-08 RX ORDER — HEPARIN SODIUM (PORCINE) LOCK FLUSH IV SOLN 100 UNIT/ML 100 UNIT/ML
500 SOLUTION INTRAVENOUS AS NEEDED
Status: DISCONTINUED | OUTPATIENT
Start: 2023-12-08 | End: 2023-12-08 | Stop reason: HOSPADM

## 2023-12-08 RX ORDER — SODIUM CHLORIDE 0.9 % (FLUSH) 0.9 %
10 SYRINGE (ML) INJECTION AS NEEDED
Status: DISCONTINUED | OUTPATIENT
Start: 2023-12-08 | End: 2023-12-08 | Stop reason: HOSPADM

## 2023-12-08 RX ORDER — HEPARIN SODIUM (PORCINE) LOCK FLUSH IV SOLN 100 UNIT/ML 100 UNIT/ML
500 SOLUTION INTRAVENOUS AS NEEDED
OUTPATIENT
Start: 2023-12-08

## 2023-12-08 RX ADMIN — Medication 500 UNITS: at 14:17

## 2023-12-08 RX ADMIN — Medication 10 ML: at 14:17

## 2023-12-11 ENCOUNTER — HOSPITAL ENCOUNTER (OUTPATIENT)
Dept: RADIATION ONCOLOGY | Facility: HOSPITAL | Age: 43
Discharge: HOME | End: 2023-12-11
Payer: COMMERCIAL

## 2023-12-11 LAB
RAD ONC ARIA COURSE ID: NORMAL
RAD ONC ARIA COURSE INTENT: NORMAL
RAD ONC ARIA COURSE LAST TREATMENT DATE: NORMAL
RAD ONC ARIA COURSE START DATE: NORMAL
RAD ONC ARIA COURSE TREATMENT ELAPSED DAYS: 4
RAD ONC ARIA FIRST TREATMENT DATE: NORMAL
RAD ONC ARIA PLAN FRACTIONS TREATED TO DATE: 3
RAD ONC ARIA PLAN FRACTIONS TREATED TO DATE: 3
RAD ONC ARIA PLAN ID: NORMAL
RAD ONC ARIA PLAN ID: NORMAL
RAD ONC ARIA PLAN PRESCRIBED DOSE PER FRACTION: 10 GY
RAD ONC ARIA PLAN PRESCRIBED DOSE PER FRACTION: 10 GY
RAD ONC ARIA PLAN PRIMARY REFERENCE POINT: NORMAL
RAD ONC ARIA PLAN PRIMARY REFERENCE POINT: NORMAL
RAD ONC ARIA PLAN TOTAL FRACTIONS PRESCRIBED: 5
RAD ONC ARIA PLAN TOTAL FRACTIONS PRESCRIBED: 5
RAD ONC ARIA PLAN TOTAL PRESCRIBED DOSE: 5000 CGY
RAD ONC ARIA PLAN TOTAL PRESCRIBED DOSE: 5000 CGY
RAD ONC ARIA REFERENCE POINT DOSAGE GIVEN TO DATE: 30 GY
RAD ONC ARIA REFERENCE POINT DOSAGE GIVEN TO DATE: 30 GY
RAD ONC ARIA REFERENCE POINT ID: NORMAL
RAD ONC ARIA REFERENCE POINT ID: NORMAL
RAD ONC ARIA REFERENCE POINT SESSION DOSAGE GIVEN: 10 GY
RAD ONC ARIA REFERENCE POINT SESSION DOSAGE GIVEN: 10 GY

## 2023-12-11 PROCEDURE — 77336 RADIATION PHYSICS CONSULT: CPT | Performed by: INTERNAL MEDICINE

## 2023-12-11 PROCEDURE — 77373 STRTCTC BDY RAD THER TX DLVR: CPT | Performed by: INTERNAL MEDICINE

## 2023-12-12 ENCOUNTER — HOSPITAL ENCOUNTER (OUTPATIENT)
Dept: RADIATION ONCOLOGY | Facility: HOSPITAL | Age: 43
Discharge: HOME | End: 2023-12-12

## 2023-12-12 LAB
RAD ONC ARIA COURSE ID: NORMAL
RAD ONC ARIA COURSE INTENT: NORMAL
RAD ONC ARIA COURSE LAST TREATMENT DATE: NORMAL
RAD ONC ARIA COURSE START DATE: NORMAL
RAD ONC ARIA COURSE TREATMENT ELAPSED DAYS: 5
RAD ONC ARIA FIRST TREATMENT DATE: NORMAL
RAD ONC ARIA PLAN FRACTIONS TREATED TO DATE: 4
RAD ONC ARIA PLAN FRACTIONS TREATED TO DATE: 4
RAD ONC ARIA PLAN ID: NORMAL
RAD ONC ARIA PLAN ID: NORMAL
RAD ONC ARIA PLAN PRESCRIBED DOSE PER FRACTION: 10 GY
RAD ONC ARIA PLAN PRESCRIBED DOSE PER FRACTION: 10 GY
RAD ONC ARIA PLAN PRIMARY REFERENCE POINT: NORMAL
RAD ONC ARIA PLAN PRIMARY REFERENCE POINT: NORMAL
RAD ONC ARIA PLAN TOTAL FRACTIONS PRESCRIBED: 5
RAD ONC ARIA PLAN TOTAL FRACTIONS PRESCRIBED: 5
RAD ONC ARIA PLAN TOTAL PRESCRIBED DOSE: 5000 CGY
RAD ONC ARIA PLAN TOTAL PRESCRIBED DOSE: 5000 CGY
RAD ONC ARIA REFERENCE POINT DOSAGE GIVEN TO DATE: 40 GY
RAD ONC ARIA REFERENCE POINT DOSAGE GIVEN TO DATE: 40 GY
RAD ONC ARIA REFERENCE POINT ID: NORMAL
RAD ONC ARIA REFERENCE POINT ID: NORMAL
RAD ONC ARIA REFERENCE POINT SESSION DOSAGE GIVEN: 10 GY
RAD ONC ARIA REFERENCE POINT SESSION DOSAGE GIVEN: 10 GY

## 2023-12-12 PROCEDURE — 77373 STRTCTC BDY RAD THER TX DLVR: CPT | Performed by: INTERNAL MEDICINE

## 2023-12-13 ENCOUNTER — HOSPITAL ENCOUNTER (OUTPATIENT)
Dept: RADIATION ONCOLOGY | Facility: HOSPITAL | Age: 43
Discharge: HOME | End: 2023-12-13
Payer: COMMERCIAL

## 2023-12-13 DIAGNOSIS — C78.02 MALIGNANT NEOPLASM METASTATIC TO BOTH LUNGS: Primary | ICD-10-CM

## 2023-12-13 DIAGNOSIS — C78.01 MALIGNANT NEOPLASM METASTATIC TO BOTH LUNGS: Primary | ICD-10-CM

## 2023-12-13 LAB
RAD ONC ARIA COURSE END DATE: NORMAL
RAD ONC ARIA COURSE ID: NORMAL
RAD ONC ARIA COURSE ID: NORMAL
RAD ONC ARIA COURSE INTENT: NORMAL
RAD ONC ARIA COURSE INTENT: NORMAL
RAD ONC ARIA COURSE LAST TREATMENT DATE: NORMAL
RAD ONC ARIA COURSE LAST TREATMENT DATE: NORMAL
RAD ONC ARIA COURSE START DATE: NORMAL
RAD ONC ARIA COURSE START DATE: NORMAL
RAD ONC ARIA COURSE TREATMENT ELAPSED DAYS: 6
RAD ONC ARIA COURSE TREATMENT ELAPSED DAYS: 6
RAD ONC ARIA FIRST TREATMENT DATE: NORMAL
RAD ONC ARIA FIRST TREATMENT DATE: NORMAL
RAD ONC ARIA PLAN FRACTIONS TREATED TO DATE: 5
RAD ONC ARIA PLAN ID: NORMAL
RAD ONC ARIA PLAN NAME: NORMAL
RAD ONC ARIA PLAN NAME: NORMAL
RAD ONC ARIA PLAN PRESCRIBED DOSE PER FRACTION: 10 GY
RAD ONC ARIA PLAN PRIMARY REFERENCE POINT: NORMAL
RAD ONC ARIA PLAN TOTAL FRACTIONS PRESCRIBED: 5
RAD ONC ARIA PLAN TOTAL PRESCRIBED DOSE: 5000 CGY
RAD ONC ARIA REFERENCE POINT DOSAGE GIVEN TO DATE: 50 GY
RAD ONC ARIA REFERENCE POINT ID: NORMAL
RAD ONC ARIA REFERENCE POINT SESSION DOSAGE GIVEN: 10 GY
RAD ONC ARIA REFERENCE POINT SESSION DOSAGE GIVEN: 10 GY

## 2023-12-13 PROCEDURE — 77373 STRTCTC BDY RAD THER TX DLVR: CPT | Performed by: INTERNAL MEDICINE

## 2023-12-19 DIAGNOSIS — Z85.048 HISTORY OF RECTAL CANCER: ICD-10-CM

## 2023-12-19 RX ORDER — DIPHENOXYLATE HYDROCHLORIDE AND ATROPINE SULFATE 2.5; .025 MG/1; MG/1
TABLET ORAL
Qty: 240 TABLET | Refills: 3 | Status: SHIPPED | OUTPATIENT
Start: 2023-12-19

## 2023-12-20 ENCOUNTER — OFFICE VISIT (OUTPATIENT)
Dept: INTERNAL MEDICINE | Facility: CLINIC | Age: 43
End: 2023-12-20
Payer: COMMERCIAL

## 2023-12-20 VITALS
BODY MASS INDEX: 31.43 KG/M2 | DIASTOLIC BLOOD PRESSURE: 80 MMHG | OXYGEN SATURATION: 99 % | HEIGHT: 66 IN | WEIGHT: 195.6 LBS | SYSTOLIC BLOOD PRESSURE: 122 MMHG | HEART RATE: 67 BPM

## 2023-12-20 DIAGNOSIS — Z79.01 CHRONIC ANTICOAGULATION: Primary | Chronic | ICD-10-CM

## 2023-12-20 DIAGNOSIS — C20 ADENOCARCINOMA OF RECTUM: Chronic | ICD-10-CM

## 2023-12-20 DIAGNOSIS — K52.9 CHRONIC DIARRHEA: Chronic | ICD-10-CM

## 2023-12-20 DIAGNOSIS — C78.02 MALIGNANT NEOPLASM METASTATIC TO BOTH LUNGS: ICD-10-CM

## 2023-12-20 DIAGNOSIS — F41.9 ANXIETY: Chronic | ICD-10-CM

## 2023-12-20 DIAGNOSIS — C78.01 MALIGNANT NEOPLASM METASTATIC TO BOTH LUNGS: ICD-10-CM

## 2023-12-20 DIAGNOSIS — D68.51 HETEROZYGOUS FACTOR V LEIDEN MUTATION: Chronic | ICD-10-CM

## 2023-12-20 NOTE — PROGRESS NOTES
Chief Complaint  Anxiety     Subjective:      History of Present Illness {CC  Problem List  Visit  Diagnosis   Encounters  Notes  Medications  Labs  Result Review Imaging  Media :23}     Carole Weaver presents to [facility] for:      Adenocarcinoma of rectum:   11/8/2023 PET/CT was performed and showed development of hypermetabolic pulmonary nodules concerning for metastatic disease.  The 9 mm right upper lobe nodule had a max SUV of 3.9.   Did not advise bx.     She had target radiation.     Anxiety: continues klonopin sparingly.   Continues lexaro     12/4/23: colonoscopy   ENDOSCOPY - COLON (12/04/2023)   One 6 mm polyp in descending colon   Repeat in 2 years: 12/2025    Declines smoking cessation.     I have reviewed patient's medical history, any new submitted information provided by patient or medical assistant and updated medical record.      Objective:      Physical Exam  Vitals reviewed.   Constitutional:       Appearance: Normal appearance. She is well-developed.   Neck:      Thyroid: No thyromegaly.   Cardiovascular:      Rate and Rhythm: Normal rate and regular rhythm.      Pulses: Normal pulses.      Heart sounds: Normal heart sounds.   Pulmonary:      Effort: Pulmonary effort is normal.      Breath sounds: Normal breath sounds.      Comments: E/U   Abdominal:      General: Bowel sounds are normal.   Musculoskeletal:      Cervical back: Normal range of motion.   Lymphadenopathy:      Cervical: No cervical adenopathy.   Skin:     Capillary Refill: Capillary refill takes 2 to 3 seconds.   Neurological:      Mental Status: She is alert and oriented to person, place, and time.   Psychiatric:         Mood and Affect: Mood normal.         Behavior: Behavior normal. Behavior is cooperative.         Thought Content: Thought content normal.         Judgment: Judgment normal.        Result Review  Data Reviewed:{ Labs  Result Review  Imaging  Med Tab  Media  ":23}     The following data was reviewed by: Deepika Vela III, NP-C on 12/20/2023  Common labs          7/23/2023    04:42 7/24/2023    04:50 7/24/2023    14:22 10/27/2023    10:28   Common Labs   Glucose 101  79   98    BUN 8  9   9    Creatinine 0.63  0.60   0.75    Sodium 140  139   140    Potassium 3.9  3.6  3.4  4.5    Chloride 107  107   104    Calcium 8.7  8.5   9.9    Albumin  3.1   3.9    Total Bilirubin    0.3    Alkaline Phosphatase    102    AST (SGOT)    13    ALT (SGPT)    12    WBC 7.33    5.82    Hemoglobin 14.0    15.1    Hematocrit 40.7    46.6    Platelets 218    231             Vital Signs:   /80 (BP Location: Left arm, Patient Position: Sitting, Cuff Size: Adult)   Pulse 67   Ht 167.6 cm (66\")   Wt 88.7 kg (195 lb 9.6 oz)   SpO2 99%   BMI 31.57 kg/m²         Requested Prescriptions      No prescriptions requested or ordered in this encounter       Routine medications provided by this office will also be refilled via pharmacy request.       Current Outpatient Medications:     clonazePAM (KlonoPIN) 1 MG tablet, Take 1 tablet as needed daily for anxiety. May take one additional as needed for severe anxiety., Disp: 45 tablet, Rfl: 0    Cyanocobalamin (VITAMIN B-12 PO), Take 1 tablet by mouth Every Other Day., Disp: , Rfl:     dicyclomine (BENTYL) 20 MG tablet, Take 1 tablet by mouth Every 6 (Six) Hours As Needed (for irritable bowel symptoms). Indications: Irritable Bowel Syndrome, Disp: 360 tablet, Rfl: 2    diphenoxylate-atropine (LOMOTIL) 2.5-0.025 MG per tablet, TAKE 2 TABLETS BY MOUTH 4 TIMES A DAY, Disp: 240 tablet, Rfl: 3    Enoxaparin Sodium (LOVENOX) 100 MG/ML solution prefilled syringe syringe, INJECT 1 ML UNDER THE SKIN INTO THE APPROPRIATE AREA AS DIRECTED EVERY 12 (TWELVE) HOURS., Disp: 60 mL, Rfl: 2    escitalopram (LEXAPRO) 10 MG tablet, TAKE 1 TABLET BY MOUTH EVERY DAY, Disp: 90 tablet, Rfl: 0    famotidine (PEPCID) 20 MG tablet, TAKE 1 TABLET BY MOUTH TWICE " A DAY, Disp: 180 tablet, Rfl: 1    Imvexxy Maintenance Pack 10 MCG insert, Insert 10 mcg into the vagina 2 (Two) Times a Week., Disp: , Rfl:     Loperamide HCl (IMODIUM PO), Take  by mouth As Needed., Disp: , Rfl:     Loratadine 10 MG capsule, Take 1 capsule by mouth Every Evening., Disp: , Rfl:     metoprolol succinate XL (TOPROL-XL) 25 MG 24 hr tablet, TAKE 0.5 TABLETS BY MOUTH EVERY NIGHT, Disp: 45 tablet, Rfl: 2    ondansetron (ZOFRAN) 8 MG tablet, TAKE 1 TABLET BY MOUTH THREE TIMES A DAY AS NEEDED FOR NAUSEA AND VOMITING, Disp: 60 tablet, Rfl: 1     Assessment and Plan:      Assessment and Plan {CC Problem List  Visit Diagnosis  ROS  Review (Popup)  Health Maintenance  Quality  BestPractice  Medications  SmartSets  SnapShot Encounters  Media :23}     Problem List Items Addressed This Visit          Coag and Thromboembolic    Chronic anticoagulation - Primary (Chronic)    Heterozygous factor V Leiden mutation (Chronic)       Gastrointestinal Abdominal     Chronic diarrhea (Chronic)       Hematology and Neoplasia    Adenocarcinoma of rectum (Chronic)    Secondary cancer of lung       Mental Health    Anxiety (Chronic)    Current Assessment & Plan     Klonopin as needed: she takes sparingly               She has completed radiation. She has some fatigue. No cough.   Bowels: she is a little more on the formed side since having colonoscopy. She will decrease her regimen so she is not straining for BM post radiation.     Per letter sent to her: last colonoscopy: 1 adenomatous polyp.     Follow Up {Instructions Charge Capture  Follow-up Communications :23}     Return in about 3 months (around 3/20/2024).      Patient was given instructions and counseling regarding her condition or for health maintenance advice. Please see specific information pulled into the AVS if appropriate.    Dragon disclaimer:   Much of this encounter note is an electronic transcription/translation of spoken language to printed  text. The electronic translation of spoken language may permit erroneous, or at times, nonsensical words or phrases to be inadvertently transcribed; Although I have reviewed the note for such errors, some may still exist.     Additional Patient Counseling:       There are no Patient Instructions on file for this visit.

## 2023-12-27 NOTE — PROGRESS NOTES
Radiation Treatment Summary Note      Patient Name: Carole Weaver  : 1980    Attending Provider: Alverto Stephen MD      Diagnosis:     ICD-10-CM ICD-9-CM   1. Malignant neoplasm metastatic to both lungs  C78.01 197.0    C78.02        Radiation Start Date: 2023    Radiation Completion Date: 2023      Prescription:     Simultaneous Integrated Boost    Site: Right upper lobe lung  Laterality: Right  Total Dose: 5000 cGy  Dose per Fraction: 1000 cGy  Total Fractions: 5  Daily or BID: Daily  Modality: Photon  Technique: SBRT (2-5fx)  Bolus: No      2.   Site: Left upper lobe lung  Laterality: Left  Total Dose: 5000 cGy  Dose per Fraction: 1000 cGy  Total Fractions: 5  Daily or BID: Daily  Modality: Photon  Technique: SBRT (2-5fx)  Bolus: No    Final Delivered Dose Deviated From Initially Prescribed Dose: No    Concurrent Chemotherapy: No    Patient Tolerated Treatment Without Unexpected Side Effects/Complications: Yes    ECOG: Fully active, able to carry on all pre-disease performance without restriction = 0    Pain Management Plan: None Indicated/PRN OTC    Follow-Up Plan: 3 months    Imaging Ordered for Follow-Up: Yes, describe: Chest CT        Alverto Stephen MD

## 2024-01-08 ENCOUNTER — PATIENT OUTREACH (OUTPATIENT)
Dept: OTHER | Facility: HOSPITAL | Age: 44
End: 2024-01-08
Payer: COMMERCIAL

## 2024-01-08 DIAGNOSIS — C20 ADENOCARCINOMA OF RECTUM: Primary | Chronic | ICD-10-CM

## 2024-01-15 ENCOUNTER — TELEPHONE (OUTPATIENT)
Dept: ONCOLOGY | Facility: CLINIC | Age: 44
End: 2024-01-15

## 2024-01-15 DIAGNOSIS — C20 ADENOCARCINOMA OF RECTUM: Chronic | ICD-10-CM

## 2024-01-15 RX ORDER — ONDANSETRON HYDROCHLORIDE 8 MG/1
TABLET, FILM COATED ORAL
Qty: 60 TABLET | Refills: 1 | Status: SHIPPED | OUTPATIENT
Start: 2024-01-15

## 2024-01-15 RX ORDER — FAMOTIDINE 20 MG/1
TABLET, FILM COATED ORAL
Qty: 180 TABLET | Refills: 2 | Status: SHIPPED | OUTPATIENT
Start: 2024-01-15

## 2024-01-17 ENCOUNTER — TELEPHONE (OUTPATIENT)
Dept: ONCOLOGY | Facility: CLINIC | Age: 44
End: 2024-01-17

## 2024-01-17 ENCOUNTER — APPOINTMENT (OUTPATIENT)
Dept: WOMENS IMAGING | Facility: HOSPITAL | Age: 44
End: 2024-01-17
Payer: COMMERCIAL

## 2024-01-17 ENCOUNTER — INFUSION (OUTPATIENT)
Dept: ONCOLOGY | Facility: HOSPITAL | Age: 44
End: 2024-01-17
Payer: COMMERCIAL

## 2024-01-17 DIAGNOSIS — Z45.2 ENCOUNTER FOR FITTING AND ADJUSTMENT OF VASCULAR CATHETER: Primary | ICD-10-CM

## 2024-01-17 PROCEDURE — 25010000002 HEPARIN LOCK FLUSH PER 10 UNITS: Performed by: INTERNAL MEDICINE

## 2024-01-17 PROCEDURE — 96523 IRRIG DRUG DELIVERY DEVICE: CPT

## 2024-01-17 RX ORDER — SODIUM CHLORIDE 0.9 % (FLUSH) 0.9 %
10 SYRINGE (ML) INJECTION AS NEEDED
Status: DISCONTINUED | OUTPATIENT
Start: 2024-01-17 | End: 2024-01-17 | Stop reason: HOSPADM

## 2024-01-17 RX ORDER — SODIUM CHLORIDE 0.9 % (FLUSH) 0.9 %
10 SYRINGE (ML) INJECTION AS NEEDED
OUTPATIENT
Start: 2024-01-17

## 2024-01-17 RX ORDER — HEPARIN SODIUM (PORCINE) LOCK FLUSH IV SOLN 100 UNIT/ML 100 UNIT/ML
500 SOLUTION INTRAVENOUS AS NEEDED
Status: DISCONTINUED | OUTPATIENT
Start: 2024-01-17 | End: 2024-01-17 | Stop reason: HOSPADM

## 2024-01-17 RX ORDER — HEPARIN SODIUM (PORCINE) LOCK FLUSH IV SOLN 100 UNIT/ML 100 UNIT/ML
500 SOLUTION INTRAVENOUS AS NEEDED
OUTPATIENT
Start: 2024-01-17

## 2024-01-17 RX ADMIN — Medication 500 UNITS: at 10:28

## 2024-01-17 RX ADMIN — Medication 10 ML: at 10:27

## 2024-01-22 DIAGNOSIS — F33.9 MONOPOLAR DEPRESSION: ICD-10-CM

## 2024-01-22 RX ORDER — ESCITALOPRAM OXALATE 10 MG/1
TABLET ORAL
Qty: 90 TABLET | Refills: 1 | Status: SHIPPED | OUTPATIENT
Start: 2024-01-22

## 2024-01-24 ENCOUNTER — TELEPHONE (OUTPATIENT)
Dept: OTHER | Facility: HOSPITAL | Age: 44
End: 2024-01-24
Payer: COMMERCIAL

## 2024-01-24 NOTE — TELEPHONE ENCOUNTER
[facility]  SURVIVORSHIP SERVICES CARE COORDINATION NOTE  PHONE      Call placed to patient  RE: open referral for survivorship care plan and treatment summary visit.    Left voice mail x 2    Introduced myself and reviewed purpose and goals of survivorship visit as well as what to expect..    Patient mailed name and contact information for Survivorship [facility] [phone]

## 2024-01-30 DIAGNOSIS — F41.9 ANXIETY: ICD-10-CM

## 2024-01-30 RX ORDER — CLONAZEPAM 1 MG/1
TABLET ORAL
Qty: 45 TABLET | Refills: 0 | Status: SHIPPED | OUTPATIENT
Start: 2024-01-30

## 2024-01-30 RX ORDER — ENOXAPARIN SODIUM 100 MG/ML
1 INJECTION SUBCUTANEOUS EVERY 12 HOURS SCHEDULED
Qty: 60 ML | Refills: 2 | Status: SHIPPED | OUTPATIENT
Start: 2024-01-30

## 2024-01-30 NOTE — TELEPHONE ENCOUNTER
Rx Refill Note  Requested Prescriptions     Pending Prescriptions Disp Refills    clonazePAM (KlonoPIN) 1 MG tablet [Pharmacy Med Name: CLONAZEPAM 1 MG TABLET] 45 tablet 0     Sig: TAKE 1 TABLET AS NEEDED DAILY FOR ANXIETY. MAY TAKE ONE ADDITIONAL AS NEEDED FOR SEVERE ANXIETY.      Last office visit with prescribing clinician: 12/20/2023  Next office visit with prescribing clinician: 3/22/2024         Erika Thakur MA  01/30/24, 10:16 EST

## 2024-02-02 ENCOUNTER — HOSPITAL ENCOUNTER (OUTPATIENT)
Dept: CT IMAGING | Facility: HOSPITAL | Age: 44
Discharge: HOME | End: 2024-02-02
Admitting: INTERNAL MEDICINE
Payer: COMMERCIAL

## 2024-02-02 DIAGNOSIS — R91.8 MULTIPLE PULMONARY NODULES: ICD-10-CM

## 2024-02-02 DIAGNOSIS — C78.01 MALIGNANT NEOPLASM METASTATIC TO BOTH LUNGS: ICD-10-CM

## 2024-02-02 DIAGNOSIS — C78.02 MALIGNANT NEOPLASM METASTATIC TO BOTH LUNGS: ICD-10-CM

## 2024-02-02 DIAGNOSIS — C20 ADENOCARCINOMA OF RECTUM: ICD-10-CM

## 2024-02-02 PROCEDURE — 25510000001 IOPAMIDOL 61 % SOLUTION: Performed by: INTERNAL MEDICINE

## 2024-02-02 PROCEDURE — 74177 CT ABD & PELVIS W/CONTRAST: CPT

## 2024-02-02 PROCEDURE — 71260 CT THORAX DX C+: CPT

## 2024-02-02 RX ADMIN — IOPAMIDOL 100 ML: 612 INJECTION, SOLUTION INTRAVENOUS at 09:00

## 2024-02-08 ENCOUNTER — TELEPHONE (OUTPATIENT)
Dept: RADIATION ONCOLOGY | Facility: HOSPITAL | Age: 44
End: 2024-02-08
Payer: COMMERCIAL

## 2024-02-09 ENCOUNTER — OFFICE VISIT (OUTPATIENT)
Dept: RADIATION ONCOLOGY | Facility: HOSPITAL | Age: 44
End: 2024-02-09
Payer: COMMERCIAL

## 2024-02-09 ENCOUNTER — OFFICE VISIT (OUTPATIENT)
Dept: ONCOLOGY | Facility: CLINIC | Age: 44
End: 2024-02-09
Payer: COMMERCIAL

## 2024-02-09 ENCOUNTER — LAB (OUTPATIENT)
Dept: LAB | Facility: HOSPITAL | Age: 44
End: 2024-02-09
Payer: COMMERCIAL

## 2024-02-09 ENCOUNTER — INFUSION (OUTPATIENT)
Dept: ONCOLOGY | Facility: HOSPITAL | Age: 44
End: 2024-02-09
Payer: COMMERCIAL

## 2024-02-09 VITALS
DIASTOLIC BLOOD PRESSURE: 76 MMHG | SYSTOLIC BLOOD PRESSURE: 114 MMHG | TEMPERATURE: 98.4 F | OXYGEN SATURATION: 97 % | RESPIRATION RATE: 18 BRPM | BODY MASS INDEX: 31.53 KG/M2 | HEART RATE: 89 BPM | WEIGHT: 196.2 LBS | HEIGHT: 66 IN

## 2024-02-09 VITALS
BODY MASS INDEX: 31.6 KG/M2 | DIASTOLIC BLOOD PRESSURE: 75 MMHG | WEIGHT: 195.8 LBS | SYSTOLIC BLOOD PRESSURE: 113 MMHG | HEART RATE: 99 BPM | OXYGEN SATURATION: 99 %

## 2024-02-09 DIAGNOSIS — C78.01 MALIGNANT NEOPLASM METASTATIC TO BOTH LUNGS: ICD-10-CM

## 2024-02-09 DIAGNOSIS — R91.8 MULTIPLE PULMONARY NODULES: ICD-10-CM

## 2024-02-09 DIAGNOSIS — C20 ADENOCARCINOMA OF RECTUM: Primary | Chronic | ICD-10-CM

## 2024-02-09 DIAGNOSIS — Z45.2 ENCOUNTER FOR FITTING AND ADJUSTMENT OF VASCULAR CATHETER: Primary | ICD-10-CM

## 2024-02-09 DIAGNOSIS — C20 ADENOCARCINOMA OF RECTUM: ICD-10-CM

## 2024-02-09 DIAGNOSIS — Z79.01 ANTICOAGULATION ADEQUATE: ICD-10-CM

## 2024-02-09 DIAGNOSIS — D68.51 HETEROZYGOUS FACTOR V LEIDEN MUTATION: Chronic | ICD-10-CM

## 2024-02-09 DIAGNOSIS — Z86.711 HISTORY OF PULMONARY EMBOLISM: ICD-10-CM

## 2024-02-09 DIAGNOSIS — C78.02 MALIGNANT NEOPLASM METASTATIC TO BOTH LUNGS: ICD-10-CM

## 2024-02-09 LAB
ALBUMIN SERPL-MCNC: 3.7 G/DL (ref 3.5–5.2)
ALBUMIN/GLOB SERPL: 1.2 G/DL
ALP SERPL-CCNC: 96 U/L (ref 39–117)
ALT SERPL W P-5'-P-CCNC: 16 U/L (ref 1–33)
ANION GAP SERPL CALCULATED.3IONS-SCNC: 7.9 MMOL/L (ref 5–15)
AST SERPL-CCNC: 21 U/L (ref 1–32)
BASOPHILS # BLD AUTO: 0.02 10*3/MM3 (ref 0–0.2)
BASOPHILS NFR BLD AUTO: 0.4 % (ref 0–1.5)
BILIRUB SERPL-MCNC: 0.3 MG/DL (ref 0–1.2)
BUN SERPL-MCNC: 5 MG/DL (ref 6–20)
BUN/CREAT SERPL: 5.7 (ref 7–25)
CALCIUM SPEC-SCNC: 9.3 MG/DL (ref 8.6–10.5)
CEA SERPL-MCNC: 1.19 NG/ML
CHLORIDE SERPL-SCNC: 103 MMOL/L (ref 98–107)
CO2 SERPL-SCNC: 29.1 MMOL/L (ref 22–29)
CREAT SERPL-MCNC: 0.87 MG/DL (ref 0.57–1)
DEPRECATED RDW RBC AUTO: 47.6 FL (ref 37–54)
EGFRCR SERPLBLD CKD-EPI 2021: 84.4 ML/MIN/1.73
EOSINOPHIL # BLD AUTO: 0.11 10*3/MM3 (ref 0–0.4)
EOSINOPHIL NFR BLD AUTO: 2.5 % (ref 0.3–6.2)
ERYTHROCYTE [DISTWIDTH] IN BLOOD BY AUTOMATED COUNT: 13.4 % (ref 12.3–15.4)
GLOBULIN UR ELPH-MCNC: 3.1 GM/DL
GLUCOSE SERPL-MCNC: 100 MG/DL (ref 65–99)
HCT VFR BLD AUTO: 45.5 % (ref 34–46.6)
HGB BLD-MCNC: 15 G/DL (ref 12–15.9)
IMM GRANULOCYTES # BLD AUTO: 0.01 10*3/MM3 (ref 0–0.05)
IMM GRANULOCYTES NFR BLD AUTO: 0.2 % (ref 0–0.5)
LYMPHOCYTES # BLD AUTO: 0.85 10*3/MM3 (ref 0.7–3.1)
LYMPHOCYTES NFR BLD AUTO: 19.1 % (ref 19.6–45.3)
MCH RBC QN AUTO: 31.6 PG (ref 26.6–33)
MCHC RBC AUTO-ENTMCNC: 33 G/DL (ref 31.5–35.7)
MCV RBC AUTO: 96 FL (ref 79–97)
MONOCYTES # BLD AUTO: 0.32 10*3/MM3 (ref 0.1–0.9)
MONOCYTES NFR BLD AUTO: 7.2 % (ref 5–12)
NEUTROPHILS NFR BLD AUTO: 3.14 10*3/MM3 (ref 1.7–7)
NEUTROPHILS NFR BLD AUTO: 70.6 % (ref 42.7–76)
NRBC BLD AUTO-RTO: 0 /100 WBC (ref 0–0.2)
PLATELET # BLD AUTO: 217 10*3/MM3 (ref 140–450)
PMV BLD AUTO: 9.1 FL (ref 6–12)
POTASSIUM SERPL-SCNC: 4.9 MMOL/L (ref 3.5–5.2)
PROT SERPL-MCNC: 6.8 G/DL (ref 6–8.5)
RBC # BLD AUTO: 4.74 10*6/MM3 (ref 3.77–5.28)
SODIUM SERPL-SCNC: 140 MMOL/L (ref 136–145)
WBC NRBC COR # BLD AUTO: 4.45 10*3/MM3 (ref 3.4–10.8)

## 2024-02-09 PROCEDURE — 36415 COLL VENOUS BLD VENIPUNCTURE: CPT

## 2024-02-09 PROCEDURE — G0463 HOSPITAL OUTPT CLINIC VISIT: HCPCS

## 2024-02-09 PROCEDURE — 96523 IRRIG DRUG DELIVERY DEVICE: CPT

## 2024-02-09 PROCEDURE — 80053 COMPREHEN METABOLIC PANEL: CPT

## 2024-02-09 PROCEDURE — 85025 COMPLETE CBC W/AUTO DIFF WBC: CPT

## 2024-02-09 PROCEDURE — 25010000002 HEPARIN LOCK FLUSH PER 10 UNITS: Performed by: INTERNAL MEDICINE

## 2024-02-09 PROCEDURE — 82378 CARCINOEMBRYONIC ANTIGEN: CPT | Performed by: INTERNAL MEDICINE

## 2024-02-09 RX ORDER — HEPARIN SODIUM (PORCINE) LOCK FLUSH IV SOLN 100 UNIT/ML 100 UNIT/ML
500 SOLUTION INTRAVENOUS AS NEEDED
Status: DISCONTINUED | OUTPATIENT
Start: 2024-02-09 | End: 2024-02-09 | Stop reason: HOSPADM

## 2024-02-09 RX ORDER — SODIUM CHLORIDE 0.9 % (FLUSH) 0.9 %
10 SYRINGE (ML) INJECTION AS NEEDED
Status: DISCONTINUED | OUTPATIENT
Start: 2024-02-09 | End: 2024-02-09 | Stop reason: HOSPADM

## 2024-02-09 RX ORDER — HEPARIN SODIUM (PORCINE) LOCK FLUSH IV SOLN 100 UNIT/ML 100 UNIT/ML
500 SOLUTION INTRAVENOUS AS NEEDED
OUTPATIENT
Start: 2024-02-09

## 2024-02-09 RX ORDER — SODIUM CHLORIDE 0.9 % (FLUSH) 0.9 %
10 SYRINGE (ML) INJECTION AS NEEDED
OUTPATIENT
Start: 2024-02-09

## 2024-02-09 RX ADMIN — Medication 500 UNITS: at 10:39

## 2024-02-09 RX ADMIN — Medication 10 ML: at 10:39

## 2024-02-09 NOTE — PROGRESS NOTES
How could I venturaeli      REASON FOR FOLLOW UP:     Rectal cancer, rectal ultrasound examination in July 2019 reported T3N0 disease without lymphadenopathy. She does have small pulmonary nodule 6-7 mm and 2 mm with indeterminate feature. There is no solid evidence of metastatic disease otherwise. Patient has stage IIA (T3N0M0) disease.  The patient is heterozygous factor V Leiden, was on prophylactic anticoagulation with Lovenox 40 mg daily given her increased risk of thrombosis with MediPort and GI malignancy.   PET scan on 8/8/2019 reported an 8 mm hypermetabolic right upper lobe pulmonary nodule, which is suspicious for metastatic as well.    Patient had a PowerPort placement on the left upper chest by Dr. Joseph on 8/9/2019.  Patient was started on concurrent infusional 5-FU chemoradiation therapy on 8/12/2019, with planned complete surgical resection and further adjuvant chemotherapy with intention to cure the disease.   Patient underwent surgical resection of the primary rectal cancer by Dr. Ye on 12/2/2019.  Pathology evaluation reported residual T3N1 disease stage IIIb.  Diarrhea related to therapy and radiation.   Patient was started cycle 1 day 1 of adjuvant FOLFOX 6 on 1/23/2020.  On 2/5/2020 FOLFOX 6 cycle 2 delayed secondary to neutropenia.  Patient was given 3 days of Granix injection.  After cycle #2 we planned 3 days of Granix with each cycle.   Patient also intolerant of oral iron.  Patient received 2 doses of IV injectafer on 02/05/2020 and 02/12/2020.   02/12/2020 Proceed with cycle #2 of FOLFOX 6 with 3 days of Granix.  On 3/11/2020 cycle 4 postponed secondary to abdominal pain and occasional low-grade fevers.  CT scans ordered.  Cycle #4 resumed after CT scan revealed no evidence of disease.  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 Ye. patient has no fever.  Continue to observe.   Grade 3 hand-foot syndrome secondary to  5-FU after cycle #7 FOLFOX 6 chemotherapy, prompting ER visit.  Also has worsening peripheral neuropathy.   Cycle #8 FOLFOX 6 was given on 5/13/2020, with 50% dose reduction for both 5-FU and oxaliplatin, and also examination of bolus 5-FU.   PET scan examination on 5/21/2020 reported no evidence of metastatic disease in the chest abdomen pelvis.  Stable 6 mm RUL pulmonary nodule.  On 5/27/2020, I discussed with the patient that we can discontinue chemotherapy at this time.   Patient had a surgical procedure for low anterior colon resection, coloanal anastomosis on 8/3/2020.  CT scan for chest abdomen pelvis on 9/9/2020 reported a new 8 mm noncalcified pulmonary nodule in the left lower lobe of the lung.  Otherwise stable right upper lobe 6 mm pulmonary nodule, and no evidence of disease recurrence in the abdomen or pelvis.  PET scan examination on 9/18/2020 reported multiple hypermetabolic small pulmonary nodules/ new pulmonary nodules.   Patient was started on pelvic chemotherapy with FOLFIRI regimen on 10/13/2020.   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 It reported tumor mutation burden 5 Muts/Mb, microsatellite stable, TP53 mutation [id number], and others.   Cycle #5 was without irinotecan, due to peripheral neuropathy.  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  Patient was switched to weight-based Lovenox injection.  Cycle #6 5-FU and irinotecan was delayed by 2 weeks because of the above incident.  Patient had a telemedicine evaluation at that the [facility] in February 2021.  They agreed with our treatment plan for palliative chemotherapy followed by maintenance chemotherapy.    PET scan examination on 4/6/2021 after cycle #12 palliative chemotherapy reported no evidence of hypermetabolic metastatic lesion.   Patient was started on maintenance  chemotherapy with 5-FU and Avastin on 4/13/2021. (Unable to tolerate Xeloda because of a significant hand-foot syndrome).   PET scan on 9/24/2021 obtained after cycle #12 maintenance chemotherapy with 5-FU, leucovorin, Avastin reported no evidence for active disease. We recommended 3 months chemotherapy holiday.  CT scan examination on 3/15/2022 reported slightly disease progression with increase in size of small pulmonary nodule.  We started patient back on maintenance chemotherapy on 3/29/2022 treatment with 5-FU plus leucovorin and Avastin, repeating every 2 weeks.  After 6 more maintenance chemotherapy, CT scan for chest abdomen pelvis was done on 6/21/2022 which reported stable sub-6 mm pulmonary nodules.  Patient had last maintenance chemotherapy on 6/7/2022.       HISTORY OF PRESENT ILLNESS:  The patient is a 43 y.o. female with the above-mentioned issue who presented today, 02/09/2024, for reevaluation after a recent CT scan examination of chest, abdomen, and pelvis obtained on 02/02/2024. This was done after the patient had stereotactic radiation therapy for the right upper lobe and one of the left upper lobe hypermetabolic lesions. The patient is here to discuss the results of the CT scan and ongoing management plan.    CT of the chest on 2/2/2024 reported shrinking of the right upper lobe lesion from 9 mm to 6 mm, and the left upper lobe lesion also decreased from 9 mm to 6 mm. Otherwise, there are a couple of stable pulmonary nodules, 7 to 8 mm. The right hilar has a small lymph node that has increased from 6 mm to 8 mm and is otherwise stable. There was no suspicious lesion in the abdomen or pelvis.    Laboratory studies today reported normal CBC. Guardant reveal study is pending.    The patient is doing well; excellent performance today, ECOG 0. She has no specific complaints. She continues to work full-time.    The patient had an appointment with radiation oncologist Dr. Stephen this morning, and he  is happy with the results from the CT scan obtained a few days ago.    The patient continues anticoagulation with Lovenox. Denies any bleeding or bruising.          Past Medical History:   Diagnosis Date    Abdominopelvic abscess 08/12/2020    ADMITTED TO [facility]    Abnormal Pap smear of cervix 02/02/1998    JULIUS I    Abscess of abdominal wall 11/28/2012    SEEN AT [facility] ER    Anemia in pregnancy     Anxiety     Bilateral epiphora 04/2020    Bilateral hand swelling 05/02/2020    SEEN AT [facility] ER    Cervical lymphadenitis 06/06/2012    SEEN AT [facility] ER    Chemotherapy induced diarrhea 01/2021    Chemotherapy induced neutropenia 04/2020    Chemotherapy-induced nausea 02/2020    Chemotherapy-induced thrombocytopenia 05/2020    Chronic anticoagulation     Chronic diarrhea     Colon polyps     FOLLOWED BY DR. 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 ALEXANDRU HUNT    History of gestational diabetes     History of pre-eclampsia 1998    History of pre-eclampsia     History of radiation therapy 2019    FOLLOWED BY DR. JAVON LEWIS    History of TB skin testing 01/17/2009    TB Skin Test    History of TB skin testing 01/17/2009    TB Skin Test    History of transfusion 2019    12/2019    HPV in female 1998    Hypokalemia 09/2019    Hypomagnesemia 09/2019    Hyponatremia 06/2021    IBS (irritable bowel syndrome)     Ileostomy present 04/25/2020    Take down on  11/3/2022    Infected insect bite of neck 2016    SEEN AT [facility]    Kidney stones 2007    SEEN AT [facility] ER    Low anterior resection syndrome 2022    FOLLOWED BY DR. PADMAJA ESPARZA    Lump of right breast     SWOLLEN LYMPH NODE    Lung cancer 2020    METASTATIC LUNG CANCER    Macrocytosis 2021    Monopolar depression     On anticoagulant therapy     Pain associated with surgical procedure 2020    Palmar-plantar erythrodysesthesia 2021    Perirectal abscess 2020    Pulmonary embolism without acute cor pulmonale 09/20/2019    X 3; ADMITTED TO [facility]    Pulmonary nodule, right 2020    Rectal cancer 2019    STAGE IIA, INVASIVE MODERATELY DIFFERENTIATED ADENOCARCINOMA, CHEMO AND XRT FINISHED 2019    Right shoulder pain 2020    SEEN AT [facility] ER    Right ureteral stone 10/01/2019    SEEN AT [facility] ER    SAB (spontaneous ) 1996     A2-1 INDUCED    Sciatica     Sepsis due to cellulitis 2002    LEFT GREAT TOE, ADMITTED TO [facility]    Tachycardia 2020    Thrombosis of superior vena cava 2020    AND BRACHIOCEPHALIC VEIN, ADMITTED TO [facility]    Urinary urgency 2020   Patient had COVID-19 infection diagnosed on 2022 despite was fully vaccinated and boosted.  She recovered without problem.      Past Surgical History:   Procedure Laterality Date    ABDOMINAL SURGERY      CHOLECYSTECTOMY N/A 10/10/2003    LAPAROSCOPIC WITH CHOLANGIOGRAM, DR. JAMEY TALAVERA AT [facility]    COLON RESECTION N/A 2019    Procedure: laparoscopic low anterior colon resection with mobilization of splenic flexure and diverting loop ileostomy: ERAS;  Surgeon: Padmaja Esparza MD;  Location: [facility] MAIN OR;  Service: General    COLON RESECTION N/A 2020    Procedure: LOW ANTERIOR COLON RESECTION, COLOANAL ANASTOMOSIS, MOBILIZATION SPLENIC FLEXURE;  Surgeon: Padmaja Esparza MD;  Location: [facility] MAIN OR;  Service: General;  Laterality: N/A;    COLONOSCOPY N/A 07/15/2020     PATENT ANASTAMOSIS IN MID RECTUM, RESCOPE IN 1 YR, DR. PADMAJA ESPARZA AT [facility]    COLONOSCOPY N/A 11/03/2022    DIFFUSE AREA OF MODERATELY FRIABLE MUCOSA IN ENTIRE COLON , DIVERSION COLITIS, PATENT AND HEALTHY ANASTAMOSIS, DR. PADMAJA ESPARZA AT [facility]    COLONOSCOPY N/A 12/04/2023    6 MM SESSILE SERRATED ADENOMA POLYP IN DESCENDING, PATENT ANASTAMOSIS IN DISTAL RECTUM, RESCOPE IN 2 YRS, DR. PADMAJA ESPARZA AT [facility]    COLONOSCOPY W/ POLYPECTOMY N/A 07/08/2019    15 MM TUBULOVILLOUS ADENOMA POLYP IN HEPATIC FLEXURE, 20 MMTUBULOVILLOUS ADENOMA WITH HIGH GRADE DYSPLASIA IN RECTOSIGMOID, 4 CM MASS IN MID RECTUM, PATH: INVASIVE MODERATELY DIFFERENTIATED ADENOCARCINOMA, DR. JENNIFER LI AT [facility]    DILATATION AND EVACUATION N/A 2009    ENDOSCOPY N/A 07/08/2019    LA GRADE A ESOPHAGITIS, GASTRITIS, ALL BIOPSIES BENIGN, DR. JENNIFER LI AT [facility]    ILEOSTOMY CLOSURE N/A 11/14/2022    Procedure: ILEOSTOMY TAKEDOWN;  Surgeon: Padmaja Esparza MD;  Location: [facility] OR;  Service: General;  Laterality: N/A;    INCISION AND DRAINAGE PERIRECTAL ABSCESS N/A 08/14/2020    Procedure: INCISION AND DRAINAGE OF retrorectal dehiscence abcess with drain placement and irrigation;  Surgeon: Padmaja Esparza MD;  Location: [facility] OR;  Service: General;  Laterality: N/A;    PAP SMEAR N/A 01/24/2014    SIGMOIDOSCOPY N/A 07/24/2019    INFILTRATIVE PARTIALLY OBSTRUCTING LARGE RECTAL CANCER, AREA TATOOED, DR. PADMAJA ESPARZA AT [facility]    SIGMOIDOSCOPY N/A 11/23/2019    AN ULCERATED PARTIALLY OBSTRUCTING MASS IN MID RECTUM, AREA TATTOOED, DR. PADMAJA ESPARZA AT [facility]    SIGMOIDOSCOPY N/A 07/22/2021    PATENT ANASTAMOSIS IN DISTAL RECTUM, RESCOPE IN 1 YR, DR. PADMAJA ESPARZA AT [facility]    TRANSRECTAL ULTRASOUND N/A 07/24/2019    Procedure: ULTRASOUND TRANSRECTAL;  Surgeon: Padmaja Esparza MD;  Location: [facility] ENDOSCOPY;  Service: General    URETEROSCOPY LASER LITHOTRIPSY WITH STENT INSERTION Right 10/30/2019    DR. ESTUARDO BEASLEY AT  [facility]    VAGINAL DELIVERY N/A 09/18/1998    VENOUS ACCESS DEVICE (PORT) INSERTION Left 08/09/2019    Procedure: INSERTION VENOUS ACCESS DEVICE;  Surgeon: Sj Joseph MD;  Location[place] [facility] [place] [facility];  Service: General    VENOUS ACCESS DEVICE (PORT) INSERTION N/A 12/18/2020    Procedure: INSERTION VENOUS ACCESS DEVICE right side, removal venous access device left side;  Surgeon: Sj Joseph MD;  Location[place] [facility] [place] [facility];  Service: General;  Laterality: N/A;    WISDOM TOOTH EXTRACTION Bilateral 1993       HEMATOLOGIC/ONCOLOGIC HISTORY:      The patient reports she has intermittent rectal bleeding and urgency, mucous with her stool, starting sometime in 2016. At that time she was referred to GI service, and was diagnosed of irritable bowel syndrome. Nevertheless she had worsening urgency for bowel movement, and had a couple of incidents losing control of stool. She was recently seen by Roland Thorpe MD again and had colonoscopy and EGD exam on 07/08/2019. She was found having a circumferential rectal mass. According to the procedure note, the patient had a fungating circumferential bleeding 4 cm mass in the middle rectum, at distance between 13 cm and 17 cm from the anus. Mass was causing partial obstruction. There were also colon polyps found at the hepatic flexure and also at the rectosigmoid junction 23 cm from the anus. Both were resected and retrieved. EGD examination reported grade A esophagitis at the GE junction and patchy discontinuous erythema and aggravation of the mucosa without bleeding in the stomach body. There is normal mucosa of the duodenum. Pathology evaluation from this procedure reported moderately differentiated adenocarcinoma involving the rectal mass. The rectal sigmoid junction polyp was tubular/tubulovillous adenoma with high grade dysplasia negative for invasive malignancy. The hepatic flexure polyp was also tubular/tubulovillous adenoma negative for high grade dysplasia or malignancy.  The biopsy from the esophagus reports squamocolumnar mucosa with inflammatory changes suggestive of mild reflux esophagitis, negative for interstitial metaplasia. Gastric biopsy was benign and duodenal biopsy was also benign. There is no mention of H-pylori.     Patient was subsequently referred to colorectal surgeon Padmaja Ye MD for further evaluation. The patient had CT scan examination for chest, abdomen and pelvis requested by Dr. Ye and were done on 07/13/2019. The study reported no evidence of lymphadenopathy in the abdomen and pelvis. There was wall thickening of the rectosigmoid junction. Normal spleen, pancreas, adrenal glands and kidneys. There was fatty liver infiltration but no focal lymphatic lesions. There was a small 6-7 mm noncalcified nodule in the right upper lobe and a tiny 3 mm subpleural nodule in the right middle lobe. No mediastinal or hilar lymphadenopathy.     Dr. Ye performed staging rectal ultrasound examination. This study reported tumor penetrating through the muscularis propria as T3 disease; there were no lymph nodes identified.    She had a hospitalization in late September 2019 because of newly discovered pulmonary emboli.  She was on prophylactic Lovenox prior to the incident of PE.  Now she is on full dose Xarelto anticoagulation.  Patient reports no further chest pain dyspnea.  She denies bleeding or bruising.  During her hospitalization, she was seen by Dr. Ye, who plans to have surgery 8 to 12 weeks after finishing radiation therapy.  She finished her radiation on 9/19/2019.     I noticed patient also presented to the emergency room on 10/1/2019 complaining of right flank area pain.  I reviewed the images studies and indeed she has a very small 1 to 2 mm obstructing kidney stone in the UV junction.  Patient is still symptomatic with some pains and dysuria.  She denies fever sweating or chills.    Laboratory study on 10/7/2019 reported normal CBC including hemoglobin  13.1, platelets 301,000, WBC 6170 and ANC 4900 lymphocytes 590 monocytes 480.      The nurse reported malfunction of port-a-catheter on 10/7/2019.  Port study on 10/8/2019 showed fibrin sheath around the distal aspect of the Mediport catheter in the SVC. This does not appear to hinder injection, but does prevent aspiration at this time.    Patient underwent surgical resection of the colon on 12/2/2019 with Dr. Ye.  Pathology evaluation reported residual T3 disease, also 1 out of 14 lymph nodes positive for malignancy.  So this patient in either had at least stage IIIb disease (T3 N1 M0?).      Adjuvant chemotherapy FOLFOX 6 will be started on 1/22/2020.  Laboratory study reported iron saturation 10%, free iron 31 TIBC 319 and ferritin 168.  Her hemoglobin was 11.8, WBC 5600, and platelets 347,000.    Patient was here on 02/12/2020 for cycle 2 of her FOLFOX.  This is delayed x1 week secondary to neutropenia.  The patient ultimately received 3 days worth of Neupogen with recovery of her blood counts.  Of note, the patient struggled with significant nausea without any episodes of vomiting following cycle #1 of chemotherapy resulting in significant weight loss.  She is up 12 pounds over the last week as her appetite has normalized.  We will add Emend to her care plan.    She is having several loose stools per her colostomy and has been hesitant to take Imodium due to prior history of constipation.  I encouraged her to try this with a maximum of 8 tablets/day.  She denies any infectious symptoms including fevers or chills.  No excess bleeding or bruising noted.  She had the expected cold sensitivity related to the Oxaliplatin for about 3 days following treatment.    Labs from 02/12/2020 demonstrated total white blood cell count of 5.14, ANC of 3.06, hemoglobin of 11.2, platelets of 211,000.  She was found to be iron deficient last week and is intolerant to oral iron secondary to GI distress.  For this reason, she  initiated IV iron therapy with Injectafer last week.  She had received her second dose 02/12/2020    Patient has normalized hemoglobin 12.2 on 2/26/2020.    She reported on 5/5/2020 she had a recent visit to the emergency department for what was suspected to be an allergic reaction.  She called our on-call service on Saturday with reports of hand and face swelling.  She was instructed to proceed to the emergency department at which time she was assessed and prescribed a Medrol Dosepak.  She had just completed her 5-FU and was unhooked on Friday, 5/3/2020.  Her symptoms have improved.  She does report persistent hyperpigmentation and mild swelling of the palms of the hands but this is much improved.  It was her right hand specifically that was swollen.  Her facial swelling has resolved.  She continues on Cefdinir nd since has 1 day remaining, she was prescribed cefdinir for a UTI requiring hospitalization at the end of April.  Reports no new symptoms.  Her labs are stable.  She is scheduled for treatment again.    Patient states at this time she is not tolerating her chemotherapy well.     Patient seen in the emergency department on 5/2/2020 for what was suspected to be an allergic reaction.  She called our on-call service on Saturday explaining that she was experiencing hand and face swelling.  She was instructed to proceed to the emergency department at which time she was assessed and prescribed a Medrol Dosepak.  She had just completed her 5-FU and was unhooked on Friday, 5/1/2020.      She reports since her ED visit on 5/2/2020 her symptoms have not improved. Her hands and feet were swollen upon presenting to the ED and she could barely make a fist. She states that she feels so swollen she is not able to stand it. She states that her skin on the hands and feet are peeling extensively as well, besides changing color to more dark.     She also reports significant fatigue after her first week of the 5-FU treatment  but she expected this side effect. She also notices significant increase in her neuropathy to the point that she is not able to even walk around in her home without her house slippers due to irritation from her rugs. She denies and associated nausea or vomiting at this time. She also has episodes of epistaxis every day after the chemotherapy cycle #7.  She does report working full-time during the week of chemotherapy.    Laboratory studies, 5/13/2020, show moderate thrombocytopenia platelets 123,000, low normal WBC 4140 including ANC 2720 and normal hemoglobin 13.4.  Because significant hand-foot syndrome, will decrease both 5-FU and oxaliplatin by 50%, and eliminate bolus dose of 5-FU.    On 5/21/2020 patient had a PET scan performed which showed no convincing evidence of residual disease in abdomen or pelvis, no metastatic disease within the chest or neck.     Cycle #8 FOLFOX 6 was given on 5/13/2020, with 50% dose reduction for both 5-FU and oxaliplatin, and also examination of bolus 5-FU.  She states on 5/27/2020 that with the recent reduction of the chemotherapy she feels significantly better. She has more energy and is able to do her daily routine.      PET scan examination on 5/21/2020 reported no evidence of metastatic disease in chest abdomen pelvis.  There was a stable 6 mm right upper lobe pulmonary nodule.    Laboratory studies on 5/27/2020 showed mild leukocytopenia WBC 3720 but a normal ANC 2250 and lymphocytes 630.  Normal platelets 163,000 and hemoglobin 12.6.  Chemistry lab reported normal renal function, liver function panel and a electrolytes, elevated glucose 164.    Laboratory studies 6/24/2020, showed normal hemoglobin 13.4 but macrocytosis .9.  Normal platelets 210,000 and WBC 5870.  She had normal CMP.     Patient last time was here in late June 2020.  Since that time she had surgical procedure for low anterior colon resection, coloanal anastomosis on 8/3/2020.  She later developed a  perirectal abscess, required surgical drainage on 8/14/2020.  She was discharged on 8/27/2020.    Patient reports to me she still has lower abdominal wall vacuum suction in place.  She denies fever sweating chills.  Performance status is ECOG 1.  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  She does have visiting nurse come to the home for wound care.    CT scan for chest abdomen pelvis on 9/9/2020 reported a new 8 mm noncalcified pulmonary nodule in the left lower lobe.  Otherwise stable right upper lobe 6 mm pulmonary nodule, and no evidence of disease recurrence in the abdomen or pelvis.     Laboratory study on 9/16/2020 reported elevated AST 55, ALT 95, alk phosphatase 143 but normal total bilirubin 0.3.  Chemistry lab otherwise unremarkable.  She has completed normal CBC.      Because of the abnormal CT scan examination for chest abdomen pelvis on 9/9/2020 reported a new 8 mm noncalcified pulmonary nodule in the left lower lobe, we obtained a PET scan examination for further evaluation, which was done on 9/18/2020.  This study reported several pulmonary nodules, some of them are hypermetabolic, newly developed compared to previous PET scan in May 2020.  Certainly does highly suspicious for metastatic disease.  There are also hypermetabolic activity in the abdomen and pelvic area which is hard to differentiate from surgical scar versus metastatic malignancy.    Laboratory study on 10/13/2020 reported normal CBC with Hb 13.9, platelets 302,000 and WBC 5520 including ANC 3830.  Chemistry lab reported normalized AST 20, alk phosphatase 116 improved ALT 35, and maintains normal bilirubin, with normal electrolytes and liver function panel.     Patient was started on first cycle of palliative chemotherapy FOLFIRI on 10/13/2020.    She recently had hospitalization from 12/21/2020 through 12/24/2020 with a new thrombus of the superior vena cava which developed after a new PowerPort catheter  placement while the patient was on Xarelto.  Patient had a new port placed 12/18/2020, and had held her Xarelto for 2 days prior to surgery.  She presented to the ER on 12/21/20 with complaints of facial and arm swelling for 3 days.  She also noted increased shortness of breath.  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  Thrombus within the right internal jugular vein cannot be excluded.  Patient was started on IV heparin, and eventually transitioned to weight-based Lovenox, which she now continues.    PET scan examination on 9/24/2021 reported further shrinking of the tiny right upper lobe pulmonary nodule.  Otherwise no new lesions.  No evidence for metastatic disease in other areas.      Patient had CT scan for chest with IV contrast obtained on 12/14/2021 which reported small tiny stable pulmonary nodules. There is a 4 mm right upper lobe pulmonary nodule. There is also a stable 4 mm left lower lobe pulmonary nodule. There is also stable left upper lobe micronodule.     Laboratory studies today on 12/21/2021 reported normal hemoglobin 15.9 however .0, platelets 218,000 WBC 5360 including neutrophils 3730 lymphocytes 980. Chemistry lab reported normal renal function, electrolytes, glucose, and marginally elevated ALT 55, AST 34 but normal total bilirubin and alk phosphatase.     CT scan for chest abdomen pelvis 6/21/2022 reported sub-6 mm pulmonary nodules either stable or slightly smaller.  No new pulmonary nodules or evidence for disease progression.  There is no evidence for metastatic disease in the liver however it shows diffuse hepatic steatosis.  There was masslike thickening in the presacral space with the clips or calcification approximately 1.7 cm in greatest AP dimension but stable.    Laboratory study on 9/20/2022 reported normal hemoglobin 15.7 with mild macrocytosis .1.  She has normal CBC and platelets.  She also has unremarkable CMP.  Normal CEA 1.13  ng/mL.    Patient was seen by Dr. Ye, who performed ileostomy takedown on 11/14/2022.  Patient reports that she is recovering.  She still has a small open wound less than 1 cm in diameter, however close to 2 cm deep.  She has been changing dressing herself.  She denies fever sweating chills.    Patient has no other specific complaints.  She has excellent performance status ECOG 0.  She denies chest pain dyspnea cough hemoptysis.  No abdominal pain.  No melena hematochezia.  Patient has been eating well, stable weight.  She works full-time.    She continues Lovenox injections and denies any significant bleeding issues.   No other new problems or concerns.     CT scan for chest abdomen pelvis obtained on 1/10/2023 reported stable small pulmonary nodules, no evidence for active or new disease.    Laboratory study on 1/10/2023 reported normal CBC and normal CMP.  CEA level is 0.99 ng/mL.    CT scan for chest, abdomen, and pelvis on 7/7/2023 reported stable small pulmonary nodules including a left upper lobe 5 mm nodule. There were no new masses, or pleural effusion. No enlarged supraclavicular, axillary, mediastinal, or hilar lymphadenopathy. Mediastinal vasculature normal. There is occlusion of the superior vena around the catheter with body wall  is present. There was no evidence for disease recurrence in the abdomen or pelvis. Bone is also negative for metastatic disease.    Laboratory studies obtained on 07/07/2023 also reported normal CBC, and normal CMP as well as low level CEA 1.07 ng/mL.    The CT scan for the chest on 10/27/2023 reported enlarging pulmonary nodules bilaterally. The right upper lobe lung nodule increased from 7 x 7 mm to 9 x 8 mm, and the left upper lobe nodule measured 8 x 9 mm from 5 x 6 mm in 04/2023. There is a different left upper lobe nodule 6 x 8 mm from previous 5 x 5 mm. The presacral tissue thickness measures up to 2.3 cm.    A laboratory study on 10/27/2023 reported CEA  1.58 ng/mL, normal CBC, and CMP.  Molecular study of peripheral blood Guardant Reveal is still pending.    The patient presents today on 11/17/2023 for reevaluation to discuss the results of PET scan examination as well as the results of tumor conference. I saw her recently on 11/03/2023 and because of enlarging pulmonary nodules on the CT scan, we requested a PET scan examination. I presented her case yesterday on 11/16/2023 at the [facility].    The patient reports she is at her baseline condition as 2 weeks ago. No specific complaints, no chest pain, dyspnea, cough, etc. She has a regular bowel movement.    Her case was present at the [facility]. on 11/16/2023. The PET scan images and chest CT scan were reviewed in conjunction with her treatment history. The consensus was for patient to receive stereotactic radiation therapy for the right upper lobe lung lesion, and the bigger left upper lobe lesion, and monitor the smaller left upper lobe lesion with further images studies down the line. Also, the conference recommended Guardant Reveal study which we already ordered.     This Guardant Reveal study came back today as negative for ctDNA 0%.      MEDICATIONS    Current Outpatient Medications:     clonazePAM (KlonoPIN) 1 MG tablet, TAKE 1 TABLET AS NEEDED DAILY FOR ANXIETY. MAY TAKE ONE ADDITIONAL AS NEEDED FOR SEVERE ANXIETY., Disp: 45 tablet, Rfl: 0    Cyanocobalamin (VITAMIN B-12 PO), Take 1 tablet by mouth Every Other Day., Disp: , Rfl:     dicyclomine (BENTYL) 20 MG tablet, Take 1 tablet by mouth Every 6 (Six) Hours As Needed (for irritable bowel symptoms). Indications: Irritable Bowel Syndrome, Disp: 360 tablet, Rfl: 2    diphenoxylate-atropine (LOMOTIL) 2.5-0.025 MG per tablet, TAKE 2 TABLETS BY MOUTH 4 TIMES A DAY, Disp: 240 tablet, Rfl: 3    Enoxaparin Sodium (LOVENOX) 100 MG/ML solution prefilled syringe syringe, INJECT 1 ML UNDER THE SKIN INTO THE APPROPRIATE AREA  AS DIRECTED EVERY 12 (TWELVE) HOURS., Disp: 60 mL, Rfl: 2    escitalopram (LEXAPRO) 10 MG tablet, TAKE 1 TABLET BY MOUTH EVERY DAY, Disp: 90 tablet, Rfl: 1    famotidine (PEPCID) 20 MG tablet, TAKE 1 TABLET BY MOUTH TWICE A DAY, Disp: 180 tablet, Rfl: 2    Imvexxy Maintenance Pack 10 MCG insert, Insert 10 mcg into the vagina 2 (Two) Times a Week., Disp: , Rfl:     Loperamide HCl (IMODIUM PO), Take  by mouth As Needed., Disp: , Rfl:     Loratadine 10 MG capsule, Take 1 capsule by mouth Every Evening., Disp: , Rfl:     metoprolol succinate XL (TOPROL-XL) 25 MG 24 hr tablet, TAKE 0.5 TABLETS BY MOUTH EVERY NIGHT, Disp: 45 tablet, Rfl: 2    ondansetron (ZOFRAN) 8 MG tablet, TAKE 1 TABLET BY MOUTH THREE TIMES A DAY AS NEEDED FOR NAUSEA AND VOMITING, Disp: 60 tablet, Rfl: 1  No current facility-administered medications for this visit.    Facility-Administered Medications Ordered in Other Visits:     heparin injection 500 Units, 500 Units, Intravenous, PRN, Dong Nguyen MD PhD, 500 Units at 02/09/24 1039    sodium chloride 0.9 % flush 10 mL, 10 mL, Intravenous, PRN, Dong Nguyen MD PhD, 10 mL at 02/09/24 1039    ALLERGIES:   No Known Allergies    SOCIAL HISTORY:       Social History     Tobacco Use    Smoking status: Every Day     Packs/day: 1.00     Years: 25.00     Additional pack years: 0.00     Total pack years: 25.00     Types: Electronic Cigarette, Cigarettes     Passive exposure: Current    Smokeless tobacco: Never    Tobacco comments:     1 PPD/caffeine use    Vaping Use    Vaping Use: Some days    Substances: Nicotine    Devices: Disposable    Passive vaping exposure: Yes   Substance Use Topics    Alcohol use: Not Currently     Comment: RARELY    Drug use: Never         FAMILY HISTORY:   Mother has positive factor V Leiden mutation, although she did not have thrombosis, mother also is coronary disease with stenting, she also is occasional bruising.    Maternal grandmother had DVT, she had multiple surgical  "procedures.    Patient mother's half-brother had metastatic colon cancer at diagnosis in his 50s.    Maternal great aunt had breast cancer.           Vitals:    02/09/24 1053   BP: 114/76   Pulse: 89   Resp: 18   Temp: 98.4 °F (36.9 °C)   TempSrc: Temporal   SpO2: 97%   Weight: 89 kg (196 lb 3.2 oz)   Height: 167.6 cm (65.98\")   PainSc: 0-No pain         2/9/2024    10:37 AM   Current Status   ECOG score 0     Physical Exam  Vitals reviewed.   Constitutional:       Appearance: Normal appearance. She is well-developed.   HENT:      Head: Normocephalic and atraumatic.      Comments:      Cardiovascular:      Rate and Rhythm: Normal rate and regular rhythm.      Heart sounds: Normal heart sounds.   Pulmonary:      Effort: Pulmonary effort is normal.      Breath sounds: Normal breath sounds.   Abdominal:      General: Bowel sounds are normal.      Palpations: Abdomen is soft. There is no mass.      Tenderness: There is no abdominal tenderness.   Lymphadenopathy:      Cervical: No cervical adenopathy.   Neurological:      Mental Status: She is alert. Mental status is at baseline.   Psychiatric:         Mood and Affect: Mood normal.       RECENT LABS:    Lab Results   Component Value Date    WBC 4.45 02/09/2024    HGB 15.0 02/09/2024    HCT 45.5 02/09/2024    MCV 96.0 02/09/2024     02/09/2024     Lab Results   Component Value Date    GLUCOSE 98 10/27/2023    BUN 9 10/27/2023    CREATININE 0.75 10/27/2023    EGFR 100.8 10/27/2023    BCR 12.0 10/27/2023    K 4.5 10/27/2023    CO2 26.7 10/27/2023    CALCIUM 9.9 10/27/2023    ALBUMIN 3.9 10/27/2023    BILITOT 0.3 10/27/2023    AST 13 10/27/2023    ALT 12 10/27/2023     Lab Results   Component Value Date    CEA 1.58 10/27/2023     Results from last 7 days   Lab Units 02/09/24  1040   WBC 10*3/mm3 4.45   NEUTROS ABS 10*3/mm3 3.14   HEMOGLOBIN g/dL 15.0   HEMATOCRIT % 45.5   PLATELETS 10*3/mm3 217                  IMAGING:  CT Chest With Contrast Diagnostic, CT Abdomen " Pelvis With Contrast  Narrative: CT CHEST W CONTRAST DIAGNOSTIC-, CT ABDOMEN PELVIS W CONTRAST-     INDICATIONS: Rectal adenocarcinoma with metastatic lung nodules,  follow-up. Radiation dose reduction techniques were utilized, including  automated exposure control and exposure modulation based on body size.     TECHNIQUE: Enhanced CT of the chest, abdomen, pelvis     COMPARISON: 11/8/2023, 10/27/2023     FINDINGS:     Chest CT:     The heart size is normal without pericardial effusion. Ascending aorta  is borderline dilated, 3.6 cm. A right hilar lymph node measuring 8 mm  short axis on axial image 42, previously 6 mm A few small subcentimeter  short axis mediastinal lymph nodes are seen that are not significant by  size criteria. Varicose veins are conspicuous in the thorax, similar to  prior exam. Right chest port extends to the superior vena cava. Tiny  thyroid nodularity appears grossly stable.     The airways appear clear.     No pleural effusion or pneumothorax.     The lungs show several pulmonary nodules, with small interval decrease.  For example, right upper lobe 6 mm nodule on axial image 34, previously  9 mm at similar level; left upper lobe, 6 mm nodule, image 39,  previously 9 mm; left upper lobe, 7 mm on image 48, previously 8 mm. In  the left lower lobe, 7 mm nodule, image 42, is not significant change,  previously 7 mm. Small atelectasis is seen in both lungs.                 Abdomen pelvis CT:     Gallbladder is surgically absent. Relative low-density of the liver  suggests steatosis.     Otherwise unremarkable appearance of the liver, spleen, adrenal glands,  pancreas, kidneys, bladder.     . Presacral soft tissue thickening is again apparent on this exam  without obvious interval change.     No bowel obstruction or abnormal bowel thickening is identified. Mild to  moderate colonic fecal retention is apparent.     No free intraperitoneal gas or free fluid.     Scattered small mesenteric and  para-aortic lymph nodes are seen that are  not significant by size criteria.     Abdominal aorta is not aneurysmal. Aortic and other arterial  calcifications are present.     Degenerative and chronic changes are seen in the spine. Stable sclerotic  focus of the left ischium. No acute fracture is identified. Multiple  subcutaneous densities at the level of the anterior pelvic wall could  relate to injections, with foci of subcutaneous soft tissue gas,  correlate clinically.           Impression:    1. Several of the previous pulmonary nodules show small interval  decrease. One left lower lobe pulmonary nodule appears stable.     2. Stable presacral soft tissue density.     Continued follow-up advised as indicated.     This report was finalized on 2/2/2024 11:36 AM by Dr. Petey Meier M.D on Workstation: [id number]         ASSESSMENT:   1.  Metastatic rectal cancer.   Initial rectal ultrasound examination reported T3N0 disease without lymphadenopathy.   CT scan of chest, abdomen and pelvis reported no lymphadenopathy in the abdomen, pelvis or chest. She does have small pulmonary nodule 6-7 mm and 2 mm with indeterminate feature. There is no solid evidence of metastatic disease otherwise.   Based on the CT scan and rectal ultrasound imaging studies, this patient had stage IIA (T3N0M0) disease.   She had PET scan examination on 8/8/2019 which reported a hypermetabolic right upper lobe pulmonary nodule 6 mm with SUV 5.6.  This is suspicious for metastatic disease however it is too small to be biopsied.  This patient may have stage IV disease.   She initiated concurrent radiation with continuous 5-FU on 8/12/2019.  Patient finished concurrent chemoradiation therapy.  Patient underwent surgical resection of the rectal tumor and diverting loop ileostomy on 12/2/2019 with Dr. Ye.  Pathology evaluation reported residual T3 disease, with 1 out of 14 lymph nodes positive for malignancy.  Certainly this patient has at  least stage IIIb rectal cancer (T3N1M0?)  On 1/22/2020 adjuvant chemotherapy FOLFOX 6 regimen initiated.   On 2/5/2022 cycle #2 was delayed secondary to neutropenia.  She was given 3 days of Granix.   Emend added with cycle 3.  With improved nausea control  Continuing home Granix x3 days following 5-FU disconnect  3/11/2020 due for cycle 4, however, she is experiencing progressive abdominal pain and occasional fevers.   CT scan performed on 3/13/2020 reveals no evidence of progressive or recurrent disease.  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  Patient hospitalized 4/24-4/26/20 after cycle 5 of chemotherapy with acute UTI.  CT abdomen/pelvis noting fluid collection in the presacral region having diminished in size compared to CT dated 3/13/2020.  Patient was evaluated by Dr. Ye while in the hospital with further plans to evaluate possible colovaginal fistula following completion of chemotherapy.  Patient did respond to IV antibiotics and discharged home on oral cefdinir.  4/29/2020 cycle 6 of FOLFOX.  Urinary symptoms have resolved   Patient seen in the [facility] ED on 5/2/2020 for what was suspected to be an allergic reaction.  She called our service on Saturday explaining that she was experiencing hand and face swelling.  She was instructed to proceed to the emergency department at which time she was assessed and prescribed a Medrol Dosepak.  She had just completed her 5-FU and was unhooked on Friday, 5/1/2020.  Her symptoms have resolved in the office on assessment, 5/5/2020.  The patient had grade 3 hand-foot syndrome based on symptomology.  Patient had cycle #8 of 5-FU on 5/13/2020. Due to her symptoms and poor tolerance to the 5-FU, her treatment dose will be reduced 50% for oxaliplatin and infusional 5-FU.  Bolus 5-FU will be discontinued..  On 5/21/2020 patient had a PET scan and it showed no evidence of of metastatic disease in the neck, chest, abdomen or pelvis.  There was fluid  accumulation/abscess.   On 5/27/2020 discussed with the patient that we can discontinue chemotherapy at this time.  She will follow-up with Dr. Ye to discuss a possibility to reverse the ileostomy.  We likely will obtain anther PET scan in 3 to 4 months for reassessment.    Patient was seen by Dr. Ye on 6/19/2019 for evaluation and to discuss possible take down of her ileostomy.  She is scheduled to have a gastrografin enema on 7/2/2020 to evaluate for a fistula, and then a colonoscopy on 7/15/2020, both done by Dr. Ye.  She states that based on the above imaging and procedure findings, she may have a more extensive surgery done or just have her ileostomy reversed, which would likely be done in August 2020.  This will all be coordinated under the care of Dr. Ye.     CT scan from 09/09/2020 and also compared to her previous PET scan examination from 05/21/2020 and also the original PET scan from 08/09/2019.  The original PET scan there is a small right upper lobe pulmonary nodule 6 mm with SUV 5.6. So in the 05/2020 PET scan the nodule is still there but activity seems much less and this most recent CT scan examination also documented the preexisting small pulmonary nodule however there is a new left lower lobe pulmonary nodule 8 mm in size and I shared with the patient today this nodule is suspicious for metastatic disease. Laboratory studies reported minimal CEA level 1.32 on 09/09/2020. Liver function panel was only marginally abnormal with the ALT 45, the remaining is normal. However laboratory study today showed worsening AST 55, ALT 95 and alkaline phosphatase 143 but is still normal, total bilirubin 0.3.   Recommended to have repeat PET scan examination for assessment because the left lower lobe pulmonary nodule is too small to be biopsied, and if the PET scan reports hypermetabolic lesion, I recommended to have stereotactic radiation therapy. Explained to patient that she is not a really good  candidate to have thoracotomy for resection because she still has unhealed wound in the abdomen. Stereotactic radiation therapy will likely be a more feasible choice.   PET scan examination obtained on 9/18/2020 showed metastatic disease.  It reported a few hypermetabolic pulmonary nodules, and some new small pulmonary nodules, all of them highly suspicious for metastatic disease.  She also has hypermetabolic activity in the abdomen and pelvic area which could be related to scar tissue from her recent surgery versus metastatic disease.  Nevertheless overall picture fits with metastatic cancer.  Discussed with the patient on 9/20/2020, we reviewed the PET scan images together, and I recommended to have systematic chemotherapy, I do not think stereotactic reading therapy to the hypermetabolic lesions in the lungs warranted at this time because even those will be treated with reading therapy, she will still need systematic chemotherapy.  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  I did discuss with the patient using anti-EGFR monoclonal antibody versus anti-VEGF monoclonal antibody.     Palliative chemotherapy cycle#1 FOLFIRI was started on 10/13/2020.  No bolus 5-FU given considering previous poor tolerance.    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  Microsatellite stable, mutation burden 5/Mb.   Discussed with the patient 10/27/2020, because of the K-s[place] mutation, she is not a candidate for anti-EGFR monoclonal antibody such as Erbitux or vectibix.  She could be a candidate for anti-VEGF monoclonal antibody, however because of active wound, she is not a candidate right now at this moment.  Patient seen in the ED for acute right neck pain on 11/16/2020.  CT angiogram as well as CT of the cervical spine performed with no acute findings but notably one of her pulmonary nodules, left upper lobe, somewhat decreased in size.  Patient given prednisone  pack.  Further details below.  Cycle #6 chemotherapy will be resumed on 1/5/2021.  Started back on original irinotecan.  PET scan examination obtained on 1/12/2021 after cycle #6 chemotherapy reported improved pulmonary nodule hypermetabolic activity.  Confirmed these are metastatic lesions.  Patient is evaluated 1/19/2020, will continue cycle #7 FOLFIRI chemotherapy.  However Avastin will be on hold see next section.   Patient was reevaluated on 2/2/2021, will continue chemotherapy cycle #8 FOLFIRI.  Avastin / biosimilar will be added.    She talked to [facility] specialist in mid February 2021, and had recommended palliative chemotherapy without surgical intervention of metastatic lung lesions.   On 3/2/2021, patient will proceed with cycle #10 FOLFIRI plus bevacizumab.  After 12 cycles, plan to start maintenance treatment.  3/16/2021 cycle 11.  Plus bevacizumab.  3/30/2021 cycle 12 FOLFIRI plus bevacizumab.  Having issues with worsening nausea despite premeds with dexamethasone, Aloxi, Emend, and Zofran at home.  Patient is requesting a dose of Phenergan, which is helped her in the past.  We will give this to her with treatment today.  PET scan examination on 4/6/2021 reported no evidence of hypermetabolic metastatic lesion.  Discussed with the patient on 4/13/2021, we reviewed the PET scan results.  We recommended to switch to maintenance chemotherapy without irinotecan.  We will continue 5-FU and Avastin every 2 weeks.  We discussed possibility of switching to oral Xeloda treatment.  Discussed with the patient for side effects more so with hand-foot syndrome.  Patient is agreeable.   Xeloda 2000 mg twice daily 7 days on, 7 days off along with Avastin initiated 4/27/2021.  After only 5 doses of Xeloda significant hand-foot syndrome, Xeloda held. Patient requesting to be transitioned back to infusional 5-FU, as she felt that she tolerated infusional 5-FU without any problem.  The  patient will receive 5-FU leucovorin and Avastin every 2 weeks.  Xeloda discontinued.  5/25/2021 continued cycle #4 maintenance 5-FU, leucovorin, Avastin tolerating this much better so far, we will continue this. Recommended to have 12 cycles of maintenance chemotherapy, then obtain PET scan for reevaluation.  We could discuss further treatment plan at that time.  9/14/2021 patient due for cycle 12 of additional maintenance 5-FU/leucovorin plus Avastin.  She continues to tolerate treatment well overall.  She is anxious in the office today with her Mediport not getting blood at first and also with upcoming scans being heavy on her mind.  She was instructed to take one of her Klonopin pills while here to help calm her mind.  Heart rate did improve from 140s down to 112.  Proceed with treatment today as scheduled.  PET scan examination on 9/24/2021 reported further shrinking of the right upper lobe tiny pulmonary nodule.  No other new lesions.  Discussed with patient on 9/24/2021 about further shrinking of the right upper lobe pulmonary nodule and no evidence for other new lesions. We recommended to have chemo break for 3 months with repeated CT scan for reevaluation.  Recent CT scan for chest 12/14/2021 and abdomen CT from 11/29/2021 reported no evidence for active disease. The right upper lobe pulmonary nodule, left lower lobe pulmonary nodule minimal about 4 mm and stable. I discussed with patient today on 12/21/2021, recommended to give her another 3 months chemotherapy holiday and obtain CT scan afterwards for reevaluation. After discussion, patient is agreeable.   CT scan examination for chest abdomen and pelvis obtained on 3/15/2022 reported a slight increase of the left upper lobe pulmonary nodule 5 mm, from previous 3 mm.  The other pulmonary nodules were stable in size.  There is also small left upper lobe groundglass changes.   I reviewed images studies with the patient today on 3/23/2022, compared to  multiple previous images including CT chest CT and PET scan, suspected disease progression.  Discussed with the patient, and I recommended to resume maintenance chemotherapy with 5-FU plus leucovorin plus Avastin repeating every 2 weeks, and obtaining CT scan for reassessment in 3 months.  Patient is agreeable.  Patient resumed maintenance chemotherapy on 3/29/2022 with 5-FU leucovorin and Avastin.   4/26/2022, proceed with cycle #27 maintenance 5-FU, leucovorin, and Avastin.  Patient continues to tolerate treatment well.    On 5/10/2022, we will proceed ahead with cycle #28 maintenance chemotherapy.  Plan to have CT scan after cycle #30.  5/24/2022 cycle 29 maintenance chemotherapy.  Continue to tolerate well.  6/7/2022 cycle #30 maintenance chemotherapy, continuing to tolerate well.   CT scan for chest abdomen pelvis with IV contrast obtained on 6/21/2022 reported sub-6 mm pulmonary nodules either stable or slightly smaller.  We reviewed the images with the patient together.  We discussed with the patient and recommended to hold chemotherapy for now with repeating CT scan in 3 months for reassessment.  CT scan of the chest abdomen pelvis 9/13/2022 reported stable condition, no evidence for recurrent or metastatic colon cancer.  On 1/10/2023 patient had a CT chest abdomen pelvis with reported no evidence for disease progression.  Laboratory study also showed normal CEA.   Patient had a CT scan for chest, abdomen, and pelvis obtained on 04/11/2023. This study reported very small pulmonary nodules, the right upper lobe nodule is stable to 6 mm, however, the left upper lobe and the left lower lobe nodule increased to 5 mm from previous 4 mm. I discussed with the patient today on 04/19/2023, I recommend to obtain another CT scan in 3 months for reassessment. The nodules are so small, they likely will not be picked up by PET scan examination.  CT scan examination of the chest, abdomen, and pelvis obtained on 7/7/2023  reported stable small pulmonary nodules. No evidence for disease progression or new lesions in chest, abdomen, and pelvis.  Discussed with patient today on 7/14/2023, however, patient is concerning for disease progression. I recommended to obtain Guardant Reveal for circulating tumor DNA study. If the study is positive, we will further obtain PET scan examination, otherwise, we will obtain CT scan for chest, abdomen, and pelvis in 3 months for reassessment.  The patient had CT scan for the chest, abdomen, and pelvis obtained on 10/27/2023, which reported worsening pulmonary nodules at bilateral upper lobes. Laboratory studies showed low normal CEA level and normal liver function panel. The Guardant Reveal study is still pending. I discussed this with the patient on 11/03/2023 and we shared the images, and I recommended having a PET scan examination for further assessment. I will present her case at the multimodality lung conference. If those lesions are deemed to be metastatic lesions, I recommend having stereotactic radiotherapy. I discussed it with the patient, and she voiced understanding and was agreeable with that strategy.  I presented her case at the multimodality chest conference 11/16/2023.  The consensus was for patient to receive stereotactic radiation therapy for the right upper lobe lung lesion, and the bigger left upper lobe lesion, and monitor the smaller left upper lobe lesion with further images studies down the line. Also, the conference recommended Guardant Reveal study which we already ordered. I discussed with the patient today on 11/17/2023, we explained to the patient that the opinion of the conference and she is agreeable with this and prefers this strategy because of limited toxicity compared to long term commitment to starting chemotherapy again. I recommend to obtain a CT scan for the chest, abdomen, and pelvis with both IV and oral contrast for reassessment in 3 months for reassessment of  metastatic lung lesions and thickness in the pelvis where the PET scan reported a low activity SUV 2.4 in the surgical bed.   The patient had steroid-induced radiation therapy for the right upper lobe and one of the left upper lobe lesions.  Her CT scan examination on 02/02/2024 reported shrinking nodules of the right upper lobe and one of the left upper lobe lesions, both from 9 mm down to 6 mm. There are 2 stable pulmonary nodules 7 mm. Nothing new.  Today, 02/09/2024, I discussed with the patient and recommended CT scan for the chest, abdomen, and pelvis in 3 months for reassessment. We will also continue laboratory studies every 3 months for Guardant Reveal, together with routine labs, CBC, CMP, and CEA.      2.   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  Low-dose Lovenox discontinued and initiated Xarelto 20 mg daily.  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  Patient held Xarelto for 2 days prior to MediPort removal from the left chest wall and placement of new port in the right chest wall on 12/18/2020.  She resumed Xarelto that evening.  Progressive swelling in the neck, face, upper extremities prompting hospitalization with CT scan showing thrombosis in the right subclavian vein and SVC.  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  Although she had been off of Xarelto for a few days, clearly Xarelto was not prohibiting progression of  her thrombosis after she resumed treatment and there was some evidence to suggest thrombosis had been present at least in the innominate vein on prior scan in September but now appears chronic.  Current acute thrombosis involving SVC and right subclavian.  Patient was admitted and placed on heparin drip  Patient was evaluated by vascular surgery and intervention was not recommended for thrombolysis/thrombectomy.  On 12/22/2020, the patient developed worsening shortness of breath, increasing upper extremity edema consistent with worsening SVC syndrome.  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  Symptoms improved and patient was discharged 12/24/2020 on Lovenox 100 mg every 12 hours.    Returns 12/29/2020 for evaluation continuing Lovenox, overall tolerating it well.  No bleeding issues.  Improved edema 1/5/2021.  Will continue Lovenox weight-based every 12 hours.  On 1/19/2021 and 2/2/2021, patient reports improved facial swelling.  She however does have being bruise on her abdomen wall where she does Lovenox injection.  However she does not have a spontaneous epistaxis, gum bleeding or other bleeding.   On 2/2/2021, we discussed that side effects of Avastin/biosimilar related to thrombosis.  Since this patient already has been on Lovenox, I think the benefits from treatment for her cancer will outweigh that risk of thrombosis, will proceed ahead with Avastin.  I cautioned patient to watch out for signs of worsening thrombosis patient voiced understanding.   On 3/16/2021, no evidence of worsening DVT, continue Lovenox.  5/11/2021 Lovenox dosing will be adjusted due to patient's weight loss.  We discussed she needs 1 mg/kg.  Her syringes are 100 mg syringes she will do 0.9 mL subcu every 12 hours.  8/3/2021 Patient continues to have bruising on abdomen from lovenox injections.  Will need to monitor  weight and adjust dosing in future if further weight loss.   Stable condition on 9/28/2021.  Continue Lovenox anticoagulation.    Patient reports no chest pain no dyspnea no leg edema. However she had bruising and also most recently abnormal small abscess for which she actually went to ER on 11/29/2021, had I&D. The wound is healing. No further drainage. Denies fever sweating or chills. After discussion, she will continue Lovenox injection for now.   On 3/23/2022, patient reports good tolerance of Lovenox.  Nevertheless she still has small quantity of pus in the left lower abdomen abscess, she squeezes it every day to prevent it became worse.  She reports no fever sweating chills.  Recommended to start Augmentin 875 mg twice a day for 7 days.  She will continue Lovenox indefinitely.  On 4/12/2022, patient reports resolution of the small abscess at left lower abdominal wall.   On 5/10/2022, patient continues on Lovenox indefinitely.  No easy bleeding or bruising.  6/23/2022 continuing on Lovenox 1 mg/kg at a dose of 100 mg (patient weight is 96.6 kg today) every 12 hours.  On 1/17/2023, patient reports good tolerance, Lovenox will be continued.    Patient had a venous Doppler study on 02/05/2023, which reported acute left upper extremity DVT in the subclavian vein, axillary and the brachial vein, and also superficial thrombophlebitis in the basilic upper arm.   On 04/19/2023, patient reports that she was not compliant with anticoagulation at the time of recurrent DVT. She now is fully compliant and is using Lovenox.  Today, 02/09/2024, the patient reports ongoing anticoagulation with no bleeding or bruising.    3.  This patient also has strong family history for malignancy   The patient had appointment with genetic counseling on September 3, 2019.  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    4.  Mild anemia.   She also has history of iron deficiency.  Iron studies reveal iron saturation of 10%.  She was started on  oral iron but was unable to tolerate due to GI side effects.   Status post Injectafer 2/5/2020 and 2/12/2020   Improved hemoglobin 13.5 on 1/5/2021.  3/16/2020 when hemoglobin now up to 16.2, hematocrit 47.6.  Patient admits that she has started smoking again, and I have encouraged her to quit.  She denies any snoring or sleep apnea diagnosis.  Patient is not taking oral iron.  We will closely monitor.  3/30/2020 hemoglobin 15.7, HCT 47.5.  Patient states that she has cut back significantly on her smoking, although not quit yet.  I have encouraged her to continue working on this.  We will continue to closely monitor.  Hemoglobin 14.6 on 6/8/2021 at the meantime he has worsening macrocytosis .6.    Laboratory studies 6/22/2021 reported excellent folate > 20 ng/mL, however low normal B12 level 357 pg/mL.    On 7/6/2021, normal hemoglobin 15.8, .9 and HCT 47.2%.  Patient was asked to start oral vitamin B12 at 1000 mcg daily.   9/14/2021 hemoglobin 17.0, hematocrit 52.1.  Monitor.  On 9/28/2021 hemoglobin 16.1 hematocrit 48.5%, continue to monitor.   Lab study on 12/14/2021 reported excellent ferritin 296 and iron saturation 25% with free iron 104 TIBC 424. Hemoglobin was 15.2 with .2.   Normal hemoglobin 14.6 on 3/15/2022.  Iron study reported normal ferritin 129.5 ng/mL however slightly low iron saturation 11%.  Continue to monitor for now.  4/26/2022, hemoglobin 14.8.  6/7/2022 hemoglobin 15.5.  On 9/20/2022 Hb 15.7, .1.  On 1/10/2023 normal hemoglobin 14.7 MCV 95.0.  Laboratory study on 04/11/2023 reported normal hemoglobin 13.9.  Lab study on 7/7/2023 reported normal hemoglobin 14.9.  A laboratory study on 10/27/2023 reported normal hemoglobin at 15.1.  Today, 02/09/2024, the patient has normal hemoglobin at 15.80.    5.  Peripheral neuropathy secondary to chemotherapy.   This is mild involving bilateral hands and feet as reported on 9/16/2020.   Will avoid chemotherapy with  oxaliplatin.  Stable mild neuropathy as of 11/10/2020  Patient reports worsening peripheral neuropathy and cycle #5 chemotherapy on 12/8/2020 with and without irinotecan.   On 1/5/2021, patient reports improvement of peripheral neuropathy, will add irinotecan back to chemotherapy.  Continue to monitor and adjust medication.  On 3/2/2021, patient reports no worsening of peripheral neuropathy after restarting irinotecan.   This remains stable, may be slightly better as reported on 3/23/2022..   No worsening since resuming chemotherapy on 3/29/2022.  On 6/23/2022 peripheral neuropathy remains stable.  No worsening peripheral neuropathy today.       6.  History of hand-foot syndrome grade 3.    It become so significant after cycle #7 FOLFOX treatment.  Discussed with patient on 5/13/2020, will discontinue the bolus 5-FU dose, and also decrease 50% of the infusion dose through the pump.   We also use Medrol Dosepak for possible recurrent symptomology.  She responded this well.   Her hand-foot syndrome improved.  Under current treatment with FOLFIRI, patient not receiving 5-FU bolus.  No recurrent issues.   Patient will be switched to maintenance chemotherapy using Xeloda in late April 2021.  Following 5 doses of Xeloda, significant hand-foot syndrome with pain in the feet, significant erythema.  Xeloda held.  Will be further discussed with Dr. Nguyen to determine if the patient will resume 5-FU versus a dose reduction to Xeloda.  Aggressive emollient moisturizer use twice daily for the past week and patient reports improvement in the dryness and erythema.  Xeloda will now be discontinued and patient will switch back to 5-FU.  As of 5/25/2021 dryness and erythema/hyperpigmentation continues to improve.  Xeloda has been discontinued.  6/8/2021, patient reports much improved hand-foot syndrome, with remnant pigmentation on palms.  On 7/6/2021, patient reports and had a some flareup of hand-foot syndrome, however improved  after aggressive moisturizing cream and the urea cream.  Overall much tolerated infusional 5-FU compared to Xeloda.    7/20/2021 continues to have mild hyperpigmentation of her hands and feet bilaterally, she continues aggressive moisturization with utter balm with urea.  She also reports some soreness of her tailbone, and we discussed that this is likely due to loss of weight and muscle mass.  I advised her to go ahead and apply moisturizer to this area as there is one tiny fissure.  Also recommended using a waffle pad or doughnut to sit on.  8/3/2021 patient reports her hand foot symptoms are stable and without worsening.  Continue to monitor.  Symptoms stable, typically flaring about 24 hours after she is unhooked from her 5-FU infusional ball and then settling down with some mild peeling.   Since off chemotherapy no specific complaints.   No recurrent symptoms after resuming chemotherapy on 3/29/2022.  5/24/2022 does report some mild symptoms about 24 hours after disconnect maintenance chemotherapy.  She is using utterly smooth twice a day.  On 9/20/2022 patient reports mild peripheral neuropathy.  Stable condition today..      7.  Depression.  Patient seen by JULIET Davenport on 11/9/2020.  She was started on mirtazapine.  Lexapro was discontinued.  This has definitely improved her mood with the patient feeling overall much better.  She is sleeping better.  Appetite is improved with her actually eating more than she wants to.    Condition is stable.  She plans to continue follow-up with supportive oncology.    8. Malfunction of the portal catheter  Patient reports there was no blood drawn from the portal catheter. CT scan of the chest on 7/7/2023 reported occlusion of the SVC around to the Port-A-Cath tubing. I recommend trying activase to see if it helps.  Otherwise, we may have to remove the catheter. Clinically, patient has no signs of SVC syndrome.  On 11/03/2023, the patient reports that her  Port-A-Cath was able to be used for a CT scan for the injection of intravenous dye; however, there was no blood return, so we needed to draw from her arm for the blood test. We will continue to keep Port-A-Cath for now.  Today, 02/09/2024, the patient reports that the port was able to be flushed. However, no blood was returned. We will continue to flush every 6 weeks.      PLAN:    Continue the abdi flush every 6 weeks.  Continue Lovenox anticoagulation twice a day as we prescribed.  Arrange CT scan for the chest, abdomen, and pelvis with intravenous contrast to be done in 3 months.  Laboratory studies, CBC, CMP, CEA, and the Guardant Reveal in 3 months.  The patient will see me in 1 week after the CT scan for further evaluation.    I shared the CT scan images with the patient and her mother today.    I discussed with the patient about laboratory results and further management plan.  Patient voiced understanding and agreeable.    I spent 38 minutes caring for Carole on this date of service. This time includes time spent by me in the following activities: preparing for the visit, reviewing tests, obtaining and/or reviewing a separately obtained history, performing a medically appropriate examination and/or evaluation, counseling and educating the patient/family/caregiver, ordering medications, tests, or procedures, referring and communicating with other health care professionals, documenting information in the medical record, independently interpreting results and communicating that information with the patient/family/caregiver and care coordination           Dong Nguyen MD PhD     Transcribed from ambient dictation for Dong Nguyen MD PhD by Mik Joseph.  02/09/24   11:59 EST    Patient or patient representative verbalized consent to the visit recording.  I have personally performed the services described in this document as transcribed by the above individual, and it is both accurate and  complete.      Guardant Reveal study was -0% ctDNA.      Dong Nguyen MD PhD        CC:  Deepika Vela III NP-MD Alverto Rahman MD

## 2024-02-10 NOTE — PROGRESS NOTES
[facility] Radiation Oncology   Follow Up    Chief Complaint  Increasing size pulmonary nodules concerning for metastatic malignancy from rectal primary       Diagnosis:    Diagnosis Plan   1. Adenocarcinoma of rectum        2. Malignant neoplasm metastatic to both lungs          Radiation Start Date: 12/7/2023     Radiation Completion Date: 12/13/2023        Prescription:      Simultaneous Integrated Boost     Site: Right upper lobe lung  Laterality: Right  Total Dose: 5000 cGy  Dose per Fraction: 1000 cGy  Total Fractions: 5  Daily or BID: Daily  Modality: Photon  Technique: SBRT (2-5fx)  Bolus: No        2.   Site: Left upper lobe lung  Laterality: Left  Total Dose: 5000 cGy  Dose per Fraction: 1000 cGy  Total Fractions: 5  Daily or BID: Daily  Modality: Photon  Technique: SBRT (2-5fx)  Bolus: No        Interval History:    Carole Weaver presents for routine follow-up to discuss her recent imaging.    The patient reports tolerating treatment well. She is interested in reviewing her recent imaging.        Imaging:      CT Chest With Contrast Diagnostic    Result Date: 2/2/2024   1. Several of the previous pulmonary nodules show small interval decrease. One left lower lobe pulmonary nodule appears stable.  2. Stable presacral soft tissue density.  Continued follow-up advised as indicated.  This report was finalized on 2/2/2024 11:36 AM by Dr. Petey Meier M.D on Workstation: [email or web address]      CT Abdomen Pelvis With Contrast    Result Date: 2/2/2024   1. Several of the previous pulmonary nodules show small interval decrease. One left lower lobe pulmonary nodule appears stable.  2. Stable presacral soft tissue density.  Continued follow-up advised as indicated.  This report was finalized on 2/2/2024 11:36 AM by Dr. Petey Meier M.D on Workstation: [email or web address]      DEXA Bone Density Axial    Result Date: 12/7/2023  Osteopenia at the right hip and lumbar spine.  This report was finalized on 12/7/2023 10:18 AM by   Pablo Henry M.D on Workstation: [id number]      NM PET/CT Skull Base to Mid Thigh    Result Date: 11/9/2023  1. Development of hypermetabolic pulmonary nodules which are likely metastases. There is no hypermetabolic lymphadenopathy. 2. Stable presacral soft tissue thickening.  This report was finalized on 11/9/2023 4:06 PM by Dr. Caroline Solano M.D on Workstation: [id number]      CT Chest With Contrast Diagnostic    Result Date: 10/30/2023  Impression: 1.  Bilateral pulmonary nodules likely representing metastatic disease with index nodules which appear to have minimally increased in size since 7/7/2023 as detailed above. 2.  No new CT findings of metastatic disease in the abdomen and pelvis. Stable sclerotic lesions in the pelvis 3.  Continued improvement in the degree of presacral soft tissue thickening over multiple prior CTs. 4.  Other findings as above.    This report was finalized on 10/30/2023 4:38 PM by Dr. Kyle Diego M.D on Workstation: [id number]      CT Abdomen Pelvis With Contrast    Result Date: 10/30/2023  Impression: 1.  Bilateral pulmonary nodules likely representing metastatic disease with index nodules which appear to have minimally increased in size since 7/7/2023 as detailed above. 2.  No new CT findings of metastatic disease in the abdomen and pelvis. Stable sclerotic lesions in the pelvis 3.  Continued improvement in the degree of presacral soft tissue thickening over multiple prior CTs. 4.  Other findings as above.    This report was finalized on 10/30/2023 4:38 PM by Dr. Kyle Diego M.D on Workstation: [id number]         Labs:    Lab Results   Component Value Date    CREATININE 0.87 02/09/2024               Problem List:  Patient Active Problem List   Diagnosis    Anxiety    Irritable bowel syndrome    Monopolar depression    Adenocarcinoma of rectum    Family history of factor V Leiden mutation    Heterozygous factor V Leiden mutation    Encounter for fitting and adjustment of  vascular catheter    Functional diarrhea    Adverse effect of antineoplastic and immunosuppressive drugs, initial encounter    Hypokalemia    Hypomagnesemia    Other pulmonary embolism without acute cor pulmonale    Kidney stone on right side    Hydronephrosis with ureteropelvic junction (UPJ) obstruction    Partial intestinal obstruction, unspecified as to cause    Irregular heart beat    Gastrointestinal hemorrhage, unspecified    Esophagitis    Family history of colonic polyps    Chronic diarrhea    Calculus of gallbladder    Benign neoplasm of transverse colon    Benign neoplasm of rectum and anal canal    Loss of weight    Heart murmur    Anemia    Anticoagulation adequate    Iron deficiency    Adverse effect of iron    Drug-induced peripheral neuropathy    On antineoplastic chemotherapy    Chemotherapy-induced thrombocytopenia    HTN (hypertension)    Chronic anticoagulation    Chemotherapy-induced neutropenia    Hand foot syndrome    Pulmonary nodule, right    Perirectal abscess    Pulmonary nodules    Secondary cancer of lung    Leukocytopenia    Thrombosis of superior vena cava    Tachycardia    History of rectal cancer    Macrocytosis without anemia    Right ureteral stone    Pulmonary embolism without acute cor pulmonale    Palmar-plantar erythrodysesthesia    On anticoagulant therapy    Lump of right breast    Kidney stones    HPV in female    History of radiation therapy    History of gestational diabetes    History of chemotherapy    History of anemia    Hemorrhoids    GERD (gastroesophageal reflux disease)    Fistula of intestine    Colon polyps    Cervical lymphadenitis    Bilateral epiphora    Family history of colon cancer    Neck pain on left side    Partial small bowel obstruction    History of pulmonary embolism          Medications:  Current Outpatient Medications on File Prior to Visit   Medication Sig Dispense Refill    clonazePAM (KlonoPIN) 1 MG tablet TAKE 1 TABLET AS NEEDED DAILY FOR  "ANXIETY. MAY TAKE ONE ADDITIONAL AS NEEDED FOR SEVERE ANXIETY. 45 tablet 0    Cyanocobalamin (VITAMIN B-12 PO) Take 1 tablet by mouth Every Other Day.      dicyclomine (BENTYL) 20 MG tablet Take 1 tablet by mouth Every 6 (Six) Hours As Needed (for irritable bowel symptoms). Indications: Irritable Bowel Syndrome 360 tablet 2    diphenoxylate-atropine (LOMOTIL) 2.5-0.025 MG per tablet TAKE 2 TABLETS BY MOUTH 4 TIMES A  tablet 3    Enoxaparin Sodium (LOVENOX) 100 MG/ML solution prefilled syringe syringe INJECT 1 ML UNDER THE SKIN INTO THE APPROPRIATE AREA AS DIRECTED EVERY 12 (TWELVE) HOURS. 60 mL 2    escitalopram (LEXAPRO) 10 MG tablet TAKE 1 TABLET BY MOUTH EVERY DAY 90 tablet 1    famotidine (PEPCID) 20 MG tablet TAKE 1 TABLET BY MOUTH TWICE A  tablet 2    Imvexxy Maintenance Pack 10 MCG insert Insert 10 mcg into the vagina 2 (Two) Times a Week.      Loperamide HCl (IMODIUM PO) Take  by mouth As Needed.      Loratadine 10 MG capsule Take 1 capsule by mouth Every Evening.      metoprolol succinate XL (TOPROL-XL) 25 MG 24 hr tablet TAKE 0.5 TABLETS BY MOUTH EVERY NIGHT 45 tablet 2    ondansetron (ZOFRAN) 8 MG tablet TAKE 1 TABLET BY MOUTH THREE TIMES A DAY AS NEEDED FOR NAUSEA AND VOMITING 60 tablet 1     Current Facility-Administered Medications on File Prior to Visit   Medication Dose Route Frequency Provider Last Rate Last Admin    [DISCONTINUED] heparin injection 500 Units  500 Units Intravenous PRDong Navarro MD PhD   500 Units at 02/09/24 1039    [DISCONTINUED] sodium chloride 0.9 % flush 10 mL  10 mL Intravenous PRN Dong Nguyen MD PhD   10 mL at 02/09/24 1039          Allergies:  No Known Allergies        Vital Signs:  /75   Pulse 99   Wt 88.8 kg (195 lb 12.8 oz)   SpO2 99%   BMI 31.60 kg/m²   Estimated body mass index is 31.6 kg/m² as calculated from the following:    Height as of 12/20/23: 167.6 cm (66\").    Weight as of this encounter: 88.8 kg (195 lb 12.8 oz).  Pain Score "    02/09/24 0918   PainSc: 0-No pain         ECOG: Fully active, able to carry on all pre-disease performance without restriction = 0    Physical Exam  Constitutional:       General: She is not in acute distress.  HENT:      Head: Normocephalic and atraumatic.   Pulmonary:      Effort: Pulmonary effort is normal. No respiratory distress.   Neurological:      Mental Status: She is alert and oriented to person, place, and time. Mental status is at baseline.   Psychiatric:         Mood and Affect: Mood normal.         Behavior: Behavior normal.            Result Review :  The following data was reviewed by: Alverto Stephen MD on 02/09/2024:    Data reviewed : Radiologic studies CT chest, abdomen, and pelvis             There are no diagnoses linked to this encounter.    Assessment:    Carole Weaver is a 43 y.o. female with history of stage IIIb, [id number], rectal adenocarcinoma diagnosed in 2019 sp neoadjuvant chemoradiation followed by surgical resection of the rectal primary on 12/2/2019.  Staging at time of surgery was stage IIIB, [id number], rectal adenocarcinoma. Patient had pulmonary nodules concerning for imaging diagnosed metastatic disease. The patient was treated with SBRT, 50 Gy in 5 fractions, completed on 12/13/2023. She returns for imaging surveillance 2 months post SBRT.     Plan:    Orders:  - Repeat imaging in 3 months  - Return visit after imaging to discuss results    We reviewed the patient's recent post treatment imaging results. She appears to have had a positive response to treatment. There is no evidence of growth in the treated lung nodules and the sub centimeter nodules that were not treated are stable. We will continue to monitor and will schedule follow-up in 3 months to review her next set of images.           I spent 30 minutes caring for Carole on this date of service. This time includes time spent by me in the following activities:preparing for the visit, reviewing tests, obtaining and/or  reviewing a separately obtained history, performing a medically appropriate examination and/or evaluation , counseling and educating the patient/family/caregiver, documenting information in the medical record, and independently interpreting results and communicating that information with the patient/family/caregiver  Follow Up   No follow-ups on file.  Patient was given instructions and counseling regarding her condition or for health maintenance advice. Please see specific information pulled into the AVS if appropriate.     Alverto Stephen MD

## 2024-02-19 LAB — REF LAB TEST METHOD: NORMAL

## 2024-02-23 NOTE — OUTREACH NOTE
General Surgery Week 2 Survey      Responses   [facility] patient discharged from?  [place]   Does the patient have one of the following disease processes/diagnoses(primary or secondary)?  General Surgery   Week 2 attempt successful?  Yes   Call start time  1751   Call end time  1753   Discharge diagnosis  Thrombosis of superior vena cava    Is patient permission given to speak with other caregiver?  No   Meds reviewed with patient/caregiver?  Yes   Is the patient taking all medications as directed (includes completed medication regime)?  Yes   Medication comments  patient reports that she had chemo today.    Does the patient have a follow up appointment scheduled with their surgeon?  Yes   Has the patient kept scheduled appointments due by today?  Yes   Has home health visited the patient within 72 hours of discharge?  N/A   Psychosocial issues?  No   Did the patient receive a copy of their discharge instructions?  Yes   Nursing interventions  Reviewed instructions with patient   What is the patient's perception of their health status since discharge?  Improving   Is the patient/caregiver able to teach back steps to recovery at home?  Set small, achievable goals for return to baseline health, Rest and rebuild strength, gradually increase activity   If the patient is a current smoker, are they able to teach back resources for cessation?  Not a smoker   Is the patient/caregiver able to teach back the hierarchy of who to call/visit for symptoms/problems? PCP, Specialist, Home health nurse, Urgent Care, ED, 911  Yes   Week 2 call completed?  Yes   Wrap up additional comments  patient denies any new questions or needs today.           Sandrita Spicer RN  
- - -

## 2024-02-26 ENCOUNTER — TELEPHONE (OUTPATIENT)
Dept: ONCOLOGY | Facility: CLINIC | Age: 44
End: 2024-02-26
Payer: COMMERCIAL

## 2024-02-26 NOTE — TELEPHONE ENCOUNTER
----- Message from Dong Nguyen MD PhD sent at 2/25/2024  2:48 PM EST -----  Regarding: Guardant reveal  Anais,    Would you tell patient that she had a negative result for the test?     Thank you very much!    Patrick     Sent to pharmacy  Take 5 pills 17 hours prior  Another 5 pills 7 hours prior and  The last 5 pills 1 hour prio  Also take 50mg benadryl  1 hour prior - benadryl is over the counter

## 2024-02-26 NOTE — TELEPHONE ENCOUNTER
Attempted to call no answer left message.  Per Dr Nguyen patient's Guardant Reveal results are negative

## 2024-03-22 ENCOUNTER — OFFICE VISIT (OUTPATIENT)
Dept: INTERNAL MEDICINE | Facility: CLINIC | Age: 44
End: 2024-03-22
Payer: COMMERCIAL

## 2024-03-22 ENCOUNTER — INFUSION (OUTPATIENT)
Dept: ONCOLOGY | Facility: HOSPITAL | Age: 44
End: 2024-03-22
Payer: COMMERCIAL

## 2024-03-22 VITALS
SYSTOLIC BLOOD PRESSURE: 120 MMHG | DIASTOLIC BLOOD PRESSURE: 80 MMHG | WEIGHT: 200 LBS | BODY MASS INDEX: 33.32 KG/M2 | OXYGEN SATURATION: 99 % | HEART RATE: 79 BPM | HEIGHT: 65 IN

## 2024-03-22 DIAGNOSIS — I10 PRIMARY HYPERTENSION: Primary | Chronic | ICD-10-CM

## 2024-03-22 DIAGNOSIS — F41.9 ANXIETY: ICD-10-CM

## 2024-03-22 DIAGNOSIS — K52.9 CHRONIC DIARRHEA: Chronic | ICD-10-CM

## 2024-03-22 DIAGNOSIS — Z45.2 ENCOUNTER FOR FITTING AND ADJUSTMENT OF VASCULAR CATHETER: Primary | ICD-10-CM

## 2024-03-22 DIAGNOSIS — Z79.01 CHRONIC ANTICOAGULATION: Chronic | ICD-10-CM

## 2024-03-22 DIAGNOSIS — C20 ADENOCARCINOMA OF RECTUM: Chronic | ICD-10-CM

## 2024-03-22 DIAGNOSIS — D68.51 HETEROZYGOUS FACTOR V LEIDEN MUTATION: Chronic | ICD-10-CM

## 2024-03-22 PROCEDURE — 25010000002 HEPARIN LOCK FLUSH PER 10 UNITS: Performed by: INTERNAL MEDICINE

## 2024-03-22 PROCEDURE — 96523 IRRIG DRUG DELIVERY DEVICE: CPT

## 2024-03-22 RX ORDER — SODIUM CHLORIDE 0.9 % (FLUSH) 0.9 %
10 SYRINGE (ML) INJECTION AS NEEDED
OUTPATIENT
Start: 2024-03-22

## 2024-03-22 RX ORDER — HEPARIN SODIUM (PORCINE) LOCK FLUSH IV SOLN 100 UNIT/ML 100 UNIT/ML
500 SOLUTION INTRAVENOUS AS NEEDED
OUTPATIENT
Start: 2024-03-22

## 2024-03-22 RX ORDER — SODIUM CHLORIDE 0.9 % (FLUSH) 0.9 %
10 SYRINGE (ML) INJECTION AS NEEDED
Status: DISCONTINUED | OUTPATIENT
Start: 2024-03-22 | End: 2024-03-22 | Stop reason: HOSPADM

## 2024-03-22 RX ORDER — CLONAZEPAM 1 MG/1
TABLET ORAL
Qty: 45 TABLET | Refills: 4 | Status: SHIPPED | OUTPATIENT
Start: 2024-03-22

## 2024-03-22 RX ORDER — HEPARIN SODIUM (PORCINE) LOCK FLUSH IV SOLN 100 UNIT/ML 100 UNIT/ML
500 SOLUTION INTRAVENOUS AS NEEDED
Status: DISCONTINUED | OUTPATIENT
Start: 2024-03-22 | End: 2024-03-22 | Stop reason: HOSPADM

## 2024-03-22 RX ADMIN — Medication 10 ML: at 10:34

## 2024-03-22 RX ADMIN — Medication 500 UNITS: at 10:34

## 2024-03-22 NOTE — PROGRESS NOTES
Chief Complaint  No chief complaint on file.     Subjective:      History of Present Illness {CC  Problem List  Visit  Diagnosis   Encounters  Notes  Medications  Labs  Result Review Imaging  Media :23}     Carole Weaver presents to [facility] for:      Adenocarcinoma of rectum:   11/8/2023 PET/CT was performed and showed development of hypermetabolic pulmonary nodules concerning for metastatic disease.  The 9 mm right upper lobe nodule had a max SUV of 3.9.   Did not advise bx.      She had target radiation. will follow up with radiation oncology: 5/1/24  Guardant Reveal: negative for ctDNA 0%.   Will have follow up CT: 5/2024 12/4/23: colonoscopy   ENDOSCOPY - COLON (12/04/2023)   One 6 mm polyp in descending colon   Repeat in 2 years: 12/2025     Diarrhea: chronic   Continues lomotil     Plans on restarting PT    Anxiety: continues klonopin sparingly.   She has been taking one nightly and then during the day once as needed.   Continues lexaro     Continues to smoke: 1 ppd   Declines smoking cessation.       Not working since May 2023:     I have reviewed patient's medical history, any new submitted information provided by patient or medical assistant and updated medical record.      Objective:      Physical Exam  Constitutional:       Appearance: Normal appearance.   Cardiovascular:      Rate and Rhythm: Normal rate.      Pulses: Normal pulses.      Heart sounds: Normal heart sounds.   Pulmonary:      Effort: Pulmonary effort is normal.      Breath sounds: Normal breath sounds.   Neurological:      Mental Status: She is oriented to person, place, and time.   Psychiatric:         Mood and Affect: Mood normal.        Result Review  Data Reviewed:{ Labs  Result Review  Imaging  Med Tab  Media :23}     The following data was reviewed by: Deepika Vela III, NP-C on 03/22/2024  Common labs          7/24/2023    04:50 7/24/2023    14:22 10/27/2023  "   10:28 2/9/2024    10:40   Common Labs   Glucose 79   98  100    BUN 9   9  5    Creatinine 0.60   0.75  0.87    Sodium 139   140  140    Potassium 3.6  3.4  4.5  4.9    Chloride 107   104  103    Calcium 8.5   9.9  9.3    Albumin 3.1   3.9  3.7    Total Bilirubin   0.3  0.3    Alkaline Phosphatase   102  96    AST (SGOT)   13  21    ALT (SGPT)   12  16    WBC   5.82  4.45    Hemoglobin   15.1  15.0    Hematocrit   46.6  45.5    Platelets   231  217             Vital Signs:   /80 (BP Location: Left arm, Patient Position: Sitting, Cuff Size: Adult)   Pulse 79   Ht 165.1 cm (65\")   Wt 90.7 kg (200 lb)   SpO2 99%   BMI 33.28 kg/m²         Requested Prescriptions     Signed Prescriptions Disp Refills    clonazePAM (KlonoPIN) 1 MG tablet 45 tablet 4     Sig: Take 1 tablet as needed daily for anxiety. May take one additional as needed for severe anxiety.       Routine medications provided by this office will also be refilled via pharmacy request.       Current Outpatient Medications:     clonazePAM (KlonoPIN) 1 MG tablet, Take 1 tablet as needed daily for anxiety. May take one additional as needed for severe anxiety., Disp: 45 tablet, Rfl: 4    Cyanocobalamin (VITAMIN B-12 PO), Take 1 tablet by mouth Every Other Day., Disp: , Rfl:     dicyclomine (BENTYL) 20 MG tablet, Take 1 tablet by mouth Every 6 (Six) Hours As Needed (for irritable bowel symptoms). Indications: Irritable Bowel Syndrome, Disp: 360 tablet, Rfl: 2    diphenoxylate-atropine (LOMOTIL) 2.5-0.025 MG per tablet, TAKE 2 TABLETS BY MOUTH 4 TIMES A DAY, Disp: 240 tablet, Rfl: 3    Enoxaparin Sodium (LOVENOX) 100 MG/ML solution prefilled syringe syringe, INJECT 1 ML UNDER THE SKIN INTO THE APPROPRIATE AREA AS DIRECTED EVERY 12 (TWELVE) HOURS., Disp: 60 mL, Rfl: 2    escitalopram (LEXAPRO) 10 MG tablet, TAKE 1 TABLET BY MOUTH EVERY DAY, Disp: 90 tablet, Rfl: 1    famotidine (PEPCID) 20 MG tablet, TAKE 1 TABLET BY MOUTH TWICE A DAY, Disp: 180 tablet, " Rfl: 2    Imvexxy Maintenance Pack 10 MCG insert, Insert 10 mcg into the vagina 2 (Two) Times a Week., Disp: , Rfl:     Loperamide HCl (IMODIUM PO), Take  by mouth As Needed., Disp: , Rfl:     Loratadine 10 MG capsule, Take 1 capsule by mouth Every Evening., Disp: , Rfl:     metoprolol succinate XL (TOPROL-XL) 25 MG 24 hr tablet, TAKE 0.5 TABLETS BY MOUTH EVERY NIGHT, Disp: 45 tablet, Rfl: 2    ondansetron (ZOFRAN) 8 MG tablet, TAKE 1 TABLET BY MOUTH THREE TIMES A DAY AS NEEDED FOR NAUSEA AND VOMITING, Disp: 60 tablet, Rfl: 1  No current facility-administered medications for this visit.     Assessment and Plan:      Assessment and Plan {CC Problem List  Visit Diagnosis  ROS  Review (Popup)  [facility] Maintenance  Quality  BestPractice  Medications  SmartSets  SnapShot Encounters  Media :23}     Problem List Items Addressed This Visit          Cardiac and Vasculature    HTN (hypertension) - Primary (Chronic)    Overview     Lisinopril 10 mg too much. (weakness, dizziness)     Continue to monitor - if need to restart, advised lower dose.           Current Assessment & Plan     Hypertension is improving with treatment.  Continue current treatment regimen.  Blood pressure will be reassessed at the next regular appointment.  Advise smoking cessation. Declines at this time.   Continues Toprol for tachycardia 2/2 chemo.             Coag and Thromboembolic    Chronic anticoagulation (Chronic)    Heterozygous factor V Leiden mutation (Chronic)       Gastrointestinal Abdominal     Chronic diarrhea (Chronic)       Hematology and Neoplasia    Adenocarcinoma of rectum (Chronic)       Mental Health    Anxiety (Chronic)    Current Assessment & Plan     She will try to decrease use at night - try to only taking during the day as needed for worsening anxiety.          Relevant Medications    clonazePAM (KlonoPIN) 1 MG tablet       Follow Up {Instructions Charge Capture  Follow-up Communications :23}     Return in about 3  months (around 6/22/2024).      Patient was given instructions and counseling regarding her condition or for health maintenance advice. Please see specific information pulled into the AVS if appropriate.    Dragon disclaimer:   Much of this encounter note is an electronic transcription/translation of spoken language to printed text. The electronic translation of spoken language may permit erroneous, or at times, nonsensical words or phrases to be inadvertently transcribed; Although I have reviewed the note for such errors, some may still exist.     Additional Patient Counseling:       There are no Patient Instructions on file for this visit.

## 2024-03-22 NOTE — ASSESSMENT & PLAN NOTE
Hypertension is improving with treatment.  Continue current treatment regimen.  Blood pressure will be reassessed at the next regular appointment.  Advise smoking cessation. Declines at this time.   Continues Toprol for tachycardia 2/2 chemo.

## 2024-03-22 NOTE — ASSESSMENT & PLAN NOTE
She will try to decrease use at night - try to only taking during the day as needed for worsening anxiety.

## 2024-04-01 NOTE — H&P
Carole Weaver is a 40 y.o. female who is pod 9 from redo LAR b/c anastomotic disruption has intra-abd and incisional wound infection  Patient was doing well at discharge postop day #4 and then Monday night developed pain at the incision.  It was a significant amount on Tuesday morning and patient call the office.  I had her come in and there was erythema at the distal end of her incision.  The wound was opened up in the office and purulent drainage was encountered.  Fascia was intact.  Wound was irrigated out with Betadine tinged saline.  Then packed with wet to dry gauze.  Dakin's was ordered.  Started the process of getting home health and wound VAC.  Patient was started on Levaquin and Flagyl.  She denies any fever.  She has had some drainage from the rectum yesterday.  Patient called this morning saying that the drainage from the wound was profuse and had to change it 3-4 times.  I ordered a CAT scan.  It showed an abscess in the subcu tissue at the superior portion of the wound.  It also showed a pelvic abscess near the anastomosis.  Also it showed some fluid around the ileostomy.  I made the decision to admit patient started on IV antibiotics and get interventional radiology to percutaneously drain the abscesses in the abdomen.     Past Medical History:   Diagnosis Date   • Abnormal Pap smear of cervix 02/02/1998    JULIUS I   • Anemia in pregnancy    • Anxiety    • Bilateral epiphora 04/2020   • Bilateral hand swelling 05/02/2020    SEEN AT [facility] ER   • Cervical lymphadenitis 06/06/2012    SEEN AT [facility] ER   • Chemotherapy induced neutropenia (CMS/HCC) 04/2020   • Chemotherapy-induced nausea 02/2020   • Chemotherapy-induced thrombocytopenia 05/2020   • Chronic diarrhea    • Colon polyps     FOLLOWED BY DR. GERONIMO ESPARZA   • Cystitis 04/24/2020    WITH DEHYDRATION, ADMITTED TO [facility]   • Drug-induced peripheral neuropathy (CMS/HCC) 05/2020   • Fistula of intestine    • GERD (gastroesophageal reflux disease)    • Hand  foot syndrome 2020   • Heart murmur     IN CHILDHOOD   • Hematochezia    • Hemorrhoids    • Heterozygous factor V Leiden mutation (CMS/[facility])     DX 2019   • History of anemia    • History of chemotherapy     FOLLOWED BY DR. ALEXANDRU HUNT   • History of gestational diabetes    • History of pre-eclampsia    • History of radiation therapy     FOLLOWED BY DR. JAVON LEWIS   • History of TB skin testing 2009    TB Skin Test   • HPV in female    • Hypokalemia 2019   • Hypomagnesemia 2019   • IBS (irritable bowel syndrome)    • Ileostomy in place (CMS/[facility])     FOLLOWED BY DR. GERONIMO YE   • Infected insect bite of neck 2016    SEEN AT [facility]   • Kidney stones 2007    SEEN AT [facility] ER   • Lump of right breast     SWOLLEN LYMPH NODE   • Monopolar depression (CMS/[facility])    • On anticoagulant therapy    • PIH (pregnancy induced hypertension)    • Pulmonary embolism without acute cor pulmonale (CMS/[facility]) 09/20/2019    X 3; ADMITTED TO [facility]   • Pulmonary nodule, right 2020   • Rectal cancer (CMS/[facility]) 2019    INVASIVE MODERATELY DIFFERENTIATED ADENOCARCINOMA, CHEMO AND XRT FINISHED 2019   • Right ureteral stone 10/01/2019    SEEN AT [facility] ER   • SAB (spontaneous ) 1996     A2-1 INDUCED   • Sciatica    • Sepsis due to cellulitis (CMS/[facility]) 2002    LEFT GREAT TOE, ADMITTED TO [facility]   • Tachycardia 2020   • Urinary urgency 2020       Past Surgical History:   Procedure Laterality Date   • CHOLECYSTECTOMY N/A 10/10/2003    LAPAROSCOPIC WITH CHOLANGIOGRAM, DR. JAMEY TALAVERA AT [facility]   • COLON RESECTION N/A 2019    Procedure: laparoscopic low anterior colon resection with mobilization of splenic flexure and diverting loop ileostomy: ERAS;  Surgeon: Geronimo Ye MD;  Location: [facility] OR;  Service: General   • COLON RESECTION N/A 8/3/2020    Procedure: LOW ANTERIOR COLON RESECTION, COLOANAL ANASTOMOSIS, MOBILIZATION SPLENIC FLEXURE;   Surgeon: Geronimo Ye MD;  Location: [facility] MAIN OR;  Service: General;  Laterality: N/A;   • COLONOSCOPY N/A 7/15/2020    PATENT ANASTAMOSIS IN MID RECTUM, RESCOPE IN 1 YR, DR. GERONIMO YE AT Located within [facility]   • COLONOSCOPY W/ POLYPECTOMY N/A 07/08/2019    15 MM TUBULOVILLOUS ADENOMA POLYP IN HEPATIC FLEXURE, 20 MMTUBULOVILLOUS ADENOMA WITH HIGH GRADE DYSPLASIA IN RECTOSIGMOID, 4 CM MASS IN MID RECTUM, PATH: INVASIVE MODERATELY DIFFERENTIATED ADENOCARCINOMA, DR. JENNIFER LI AT [facility]   • DILATATION AND EVACUATION N/A 2009   • ENDOSCOPY N/A 07/08/2019    LA GRADE A ESOPHAGITIS, GASTRITIS, ALL BIOPSIES BENIGN, DR. JENNIFER LI AT [facility]   • PAP SMEAR N/A 01/24/2014   • SIGMOIDOSCOPY N/A 7/24/2019    INFILTRATIVE PARTIALLY OBSTRUCTING LARGE RECTAL CANCER, AREA TATOOED, DR. GERONIMO YE AT Located within [facility]   • SIGMOIDOSCOPY N/A 11/23/2019    AN ULCERATED PARTIALLY OBSTRUCTING MASS IN MID RECTUM, AREA TATTOOED, DR. GERONIMO YE AT Located within [facility]   • TRANSRECTAL ULTRASOUND N/A 7/24/2019    Procedure: ULTRASOUND TRANSRECTAL;  Surgeon: Geronimo Ye MD;  Location: [facility] ENDOSCOPY;  Service: General   • URETEROSCOPY LASER LITHOTRIPSY WITH STENT INSERTION Right 10/30/2019    DR. ESTUARDO BEASLEY AT [facility]   • VAGINAL DELIVERY N/A 09/18/1998   • VENOUS ACCESS DEVICE (PORT) INSERTION Left 8/9/2019    Procedure: INSERTION VENOUS ACCESS DEVICE;  Surgeon: Sj Joseph MD;  Location: [facility] OR OSC;  Service: General   • WISDOM TOOTH EXTRACTION Bilateral 1993       Social History:   reports that she has been smoking electronic cigarette and cigarettes. She has a 24.00 pack-year smoking history. She has never used smokeless tobacco. She reports that she drank alcohol. She reports that she does not use drugs.      Marriage status:      Family History   Problem Relation Age of Onset   • Hyperlipidemia Mother    • Colon polyps Mother    • Heart disease Mother    • Hypertension Mother    • Factor V Leiden deficiency  Mother    • Skin cancer Father         Squamous in 60s   • Heart disease Paternal Grandfather    • No Known Problems Son    • Cancer Paternal Uncle    • Colon cancer Paternal Uncle    • Diabetes Paternal Uncle    • Mental illness Sister         Addiction   • Colon cancer Maternal Uncle    • Malig Hyperthermia Neg Hx        No current facility-administered medications on file prior to encounter.      Current Outpatient Medications on File Prior to Encounter   Medication Sig Dispense Refill   • clonazePAM (KlonoPIN) 1 MG tablet TAKE 1 TABLET BY MOUTH 3 (THREE) TIMES A DAY AS NEEDED FOR ANXIETY OR SEIZURES. 90 tablet 0   • dicyclomine (Bentyl) 20 MG tablet Take 1 tablet by mouth Every 6 (Six) Hours. 120 tablet 0   • escitalopram (LEXAPRO) 20 MG tablet Take 1 tablet by mouth Daily. 90 tablet 3   • famotidine (PEPCID) 20 MG tablet TAKE 1 TABLET BY MOUTH TWICE A DAY (Patient taking differently: Take 20 mg by mouth Every Evening.) 180 tablet 1   • HYDROcodone-acetaminophen (NORCO) 7.5-325 MG per tablet Take 1 tablet by mouth Every 6 (Six) Hours As Needed for Moderate Pain . 240 tablet 0   • levoFLOXacin (Levaquin) 500 MG tablet Take 1 tablet by mouth Daily for 10 days. 10 tablet 0   • Loratadine (CLARITIN) 10 MG capsule Take 10 mg by mouth Every Evening.     • methocarbamol (ROBAXIN) 500 MG tablet Take 1 tablet by mouth Every 8 (Eight) Hours. 46 tablet 0   • metroNIDAZOLE (FLAGYL) 500 MG tablet Take 1 tablet by mouth 3 (Three) Times a Day for 10 days. 30 tablet 0   • ondansetron (ZOFRAN) 8 MG tablet Take 1 tablet by mouth 3 (Three) Times a Day As Needed for Nausea or Vomiting. 60 tablet 5   • prochlorperazine (COMPAZINE) 10 MG tablet Take 1 tablet by mouth Every 6 (Six) Hours As Needed for Nausea or Vomiting. 30 tablet 2   • rivaroxaban (XARELTO) 20 MG tablet Take 1 tablet by mouth Daily. Start after finishing starter pack. (Patient taking differently: Take 20 mg by mouth Daily. HOLDING 2 DAYS PRIOR TO SURGERY) 30 tablet  11   • [] sodium hypochlorite (DAKIN'S) 0.25 % topical solution Apply  topically to the appropriate area as directed 1 (One) Time for 1 dose. 500 mL 1   • [DISCONTINUED] escitalopram (LEXAPRO) 20 MG tablet Take 1 tablet by mouth Daily. (Patient taking differently: Take 20 mg by mouth Every Evening.) 90 tablet 3     Allergy  Patient has no known allergies.    Review of Systems   Constitution: Negative for decreased appetite, fever and weight gain.   HENT: Negative for congestion, hearing loss and hoarse voice.    Eyes: Negative for blurred vision, discharge and visual disturbance.   Cardiovascular: Negative for chest pain, cyanosis and leg swelling.   Respiratory: Negative for cough, shortness of breath, sleep disturbances due to breathing and snoring.    Endocrine: Negative for cold intolerance and heat intolerance.   Hematologic/Lymphatic: Does not bruise/bleed easily.   Skin: Negative for itching, poor wound healing and skin cancer.   Musculoskeletal: Negative for arthritis, back pain, joint pain and joint swelling.   Gastrointestinal: Positive for abdominal pain. Negative for change in bowel habit, bowel incontinence and constipation.   Genitourinary: Negative for bladder incontinence, dysuria and hematuria.   Neurological: Negative for brief paralysis, excessive daytime sleepiness, dizziness, focal weakness, headaches, light-headedness and weakness.   Psychiatric/Behavioral: Negative for altered mental status and hallucinations. The patient does not have insomnia.    Allergic/Immunologic: Negative for HIV exposure and persistent infections.       Vitals:    20 1604   BP: 123/80   Pulse: 104   Resp: 18   Temp: 98.5 °F (36.9 °C)     Body mass index is 35.46 kg/m².    Physical Exam   Constitutional: She is oriented to person, place, and time. She appears well-developed and well-nourished. No distress.   HENT:   Head: Normocephalic and atraumatic.   Nose: Nose normal.   Mouth/Throat: Oropharynx is clear  and moist.   Eyes: Pupils are equal, round, and reactive to light. Conjunctivae and EOM are normal.   Neck: Normal range of motion. No tracheal deviation present.   Pulmonary/Chest: Effort normal and breath sounds normal. No respiratory distress.   Abdominal: Soft. Bowel sounds are normal. She exhibits no distension.   Midline incision superior and below umbilicus erythema and purulent drainage.  Wound opened up superiorly in office and got copious amounts of purulence out.  Packed with Betadine 10 inch Kerlix   Musculoskeletal: Normal range of motion. She exhibits no edema or deformity.   Neurological: She is alert and oriented to person, place, and time. No cranial nerve deficit. Coordination and gait normal.   Skin: Skin is warm and dry.   Psychiatric: She has a normal mood and affect. Her behavior is normal. Judgment normal.       Review of Medical Record:  I reviewed CT scan images and report and reviewed with radiology    Assessment:  1. Abdominopelvic abscess (CMS/HCC)        Plan: Admit, IV antibiotics, interventional radiology to drain in her abdominal abscess.  While in the hospital will get wound VAC placed.   Detail Level: Detailed Depth Of Biopsy: dermis Was A Bandage Applied: Yes Size Of Lesion In Cm: 0 Biopsy Type: H and E Biopsy Method: Dermablade Anesthesia Type: 1% lidocaine with epinephrine and a 1:10 solution of 8.4% sodium bicarbonate Anesthesia Volume In Cc: 0.2 Hemostasis: Drysol Wound Care: Petrolatum Dressing: bandage Destruction After The Procedure: No Type Of Destruction Used: Curettage Curettage Text: The wound bed was treated with curettage after the biopsy was performed. Cryotherapy Text: The wound bed was treated with cryotherapy after the biopsy was performed. Electrodesiccation Text: The wound bed was treated with electrodesiccation after the biopsy was performed. Electrodesiccation And Curettage Text: The wound bed was treated with electrodesiccation and curettage after the biopsy was performed. Silver Nitrate Text: The wound bed was treated with silver nitrate after the biopsy was performed. Lab: [id number] Lab Facility: [id number] Consent: Written consent was obtained and risks were reviewed including but not limited to scarring, infection, bleeding, scabbing, incomplete removal, nerve damage and allergy to anesthesia. Post-Care Instructions: I reviewed with the patient in detail post-care instructions. Patient is to keep the biopsy site dry overnight, and then apply bacitracin twice daily until healed. Patient may apply hydrogen peroxide soaks to remove any crusting. Notification Instructions: Patient will be notified of biopsy results. However, patient instructed to call the office if not contacted within 2 weeks. Billing Type: Third-Party Bill Information: Selecting Yes will display possible errors in your note based on the variables you have selected. This validation is only offered as a suggestion for you. PLEASE NOTE THAT THE VALIDATION TEXT WILL BE REMOVED WHEN YOU FINALIZE YOUR NOTE. IF YOU WANT TO FAX A PRELIMINARY NOTE YOU WILL NEED TO TOGGLE THIS TO 'NO' IF YOU DO NOT WANT IT IN YOUR FAXED NOTE.

## 2024-04-23 ENCOUNTER — HOSPITAL ENCOUNTER (OUTPATIENT)
Facility: HOSPITAL | Age: 44
Discharge: HOME | End: 2024-04-23
Payer: COMMERCIAL

## 2024-04-23 ENCOUNTER — LAB (OUTPATIENT)
Facility: HOSPITAL | Age: 44
End: 2024-04-23
Payer: COMMERCIAL

## 2024-04-23 DIAGNOSIS — C20 ADENOCARCINOMA OF RECTUM: ICD-10-CM

## 2024-04-23 DIAGNOSIS — R91.8 MULTIPLE PULMONARY NODULES: ICD-10-CM

## 2024-04-23 DIAGNOSIS — C78.01 MALIGNANT NEOPLASM METASTATIC TO BOTH LUNGS: ICD-10-CM

## 2024-04-23 DIAGNOSIS — C78.02 MALIGNANT NEOPLASM METASTATIC TO BOTH LUNGS: ICD-10-CM

## 2024-04-23 LAB
ALBUMIN SERPL-MCNC: 3.9 G/DL (ref 3.5–5.2)
ALBUMIN/GLOB SERPL: 1.4 G/DL
ALP SERPL-CCNC: 94 U/L (ref 39–117)
ALT SERPL W P-5'-P-CCNC: 16 U/L (ref 1–33)
ANION GAP SERPL CALCULATED.3IONS-SCNC: 8.8 MMOL/L (ref 5–15)
AST SERPL-CCNC: 10 U/L (ref 1–32)
BASOPHILS # BLD AUTO: 0.01 10*3/MM3 (ref 0–0.2)
BASOPHILS NFR BLD AUTO: 0.2 % (ref 0–1.5)
BILIRUB SERPL-MCNC: <0.2 MG/DL (ref 0–1.2)
BUN SERPL-MCNC: 9 MG/DL (ref 6–20)
BUN/CREAT SERPL: 10.8 (ref 7–25)
CALCIUM SPEC-SCNC: 9.6 MG/DL (ref 8.6–10.5)
CEA SERPL-MCNC: 1.25 NG/ML
CHLORIDE SERPL-SCNC: 101 MMOL/L (ref 98–107)
CO2 SERPL-SCNC: 28.2 MMOL/L (ref 22–29)
CREAT SERPL-MCNC: 0.83 MG/DL (ref 0.57–1)
DEPRECATED RDW RBC AUTO: 45.8 FL (ref 37–54)
EGFRCR SERPLBLD CKD-EPI 2021: 89.3 ML/MIN/1.73
EOSINOPHIL # BLD AUTO: 0.11 10*3/MM3 (ref 0–0.4)
EOSINOPHIL NFR BLD AUTO: 1.9 % (ref 0.3–6.2)
ERYTHROCYTE [DISTWIDTH] IN BLOOD BY AUTOMATED COUNT: 13.1 % (ref 12.3–15.4)
GLOBULIN UR ELPH-MCNC: 2.8 GM/DL
GLUCOSE SERPL-MCNC: 98 MG/DL (ref 65–99)
HCT VFR BLD AUTO: 42.1 % (ref 34–46.6)
HGB BLD-MCNC: 14 G/DL (ref 12–15.9)
IMM GRANULOCYTES # BLD AUTO: 0.02 10*3/MM3 (ref 0–0.05)
IMM GRANULOCYTES NFR BLD AUTO: 0.3 % (ref 0–0.5)
LYMPHOCYTES # BLD AUTO: 0.79 10*3/MM3 (ref 0.7–3.1)
LYMPHOCYTES NFR BLD AUTO: 13.6 % (ref 19.6–45.3)
MCH RBC QN AUTO: 31.4 PG (ref 26.6–33)
MCHC RBC AUTO-ENTMCNC: 33.3 G/DL (ref 31.5–35.7)
MCV RBC AUTO: 94.4 FL (ref 79–97)
MONOCYTES # BLD AUTO: 0.39 10*3/MM3 (ref 0.1–0.9)
MONOCYTES NFR BLD AUTO: 6.7 % (ref 5–12)
NEUTROPHILS NFR BLD AUTO: 4.5 10*3/MM3 (ref 1.7–7)
NEUTROPHILS NFR BLD AUTO: 77.3 % (ref 42.7–76)
NRBC BLD AUTO-RTO: 0 /100 WBC (ref 0–0.2)
PLATELET # BLD AUTO: 223 10*3/MM3 (ref 140–450)
PMV BLD AUTO: 9.4 FL (ref 6–12)
POTASSIUM SERPL-SCNC: 3.9 MMOL/L (ref 3.5–5.2)
PROT SERPL-MCNC: 6.7 G/DL (ref 6–8.5)
RBC # BLD AUTO: 4.46 10*6/MM3 (ref 3.77–5.28)
SODIUM SERPL-SCNC: 138 MMOL/L (ref 136–145)
WBC NRBC COR # BLD AUTO: 5.82 10*3/MM3 (ref 3.4–10.8)

## 2024-04-23 PROCEDURE — 85025 COMPLETE CBC W/AUTO DIFF WBC: CPT | Performed by: INTERNAL MEDICINE

## 2024-04-23 PROCEDURE — 25510000001 IOPAMIDOL 61 % SOLUTION: Performed by: INTERNAL MEDICINE

## 2024-04-23 PROCEDURE — 82378 CARCINOEMBRYONIC ANTIGEN: CPT | Performed by: INTERNAL MEDICINE

## 2024-04-23 PROCEDURE — 71260 CT THORAX DX C+: CPT

## 2024-04-23 PROCEDURE — 0 DIATRIZOATE MEGLUMINE & SODIUM PER 1 ML: Performed by: INTERNAL MEDICINE

## 2024-04-23 PROCEDURE — 74177 CT ABD & PELVIS W/CONTRAST: CPT

## 2024-04-23 PROCEDURE — 80053 COMPREHEN METABOLIC PANEL: CPT | Performed by: INTERNAL MEDICINE

## 2024-04-23 RX ADMIN — IOPAMIDOL 85 ML: 612 INJECTION, SOLUTION INTRAVENOUS at 10:39

## 2024-04-23 RX ADMIN — DIATRIZOATE MEGLUMINE AND DIATRIZOATE SODIUM 30 ML: 600; 100 SOLUTION ORAL; RECTAL at 09:00

## 2024-05-01 ENCOUNTER — OFFICE VISIT (OUTPATIENT)
Dept: RADIATION ONCOLOGY | Facility: HOSPITAL | Age: 44
End: 2024-05-01
Payer: COMMERCIAL

## 2024-05-01 VITALS
DIASTOLIC BLOOD PRESSURE: 81 MMHG | BODY MASS INDEX: 33.51 KG/M2 | WEIGHT: 201.4 LBS | HEART RATE: 76 BPM | SYSTOLIC BLOOD PRESSURE: 121 MMHG | OXYGEN SATURATION: 96 %

## 2024-05-01 DIAGNOSIS — C78.01 MALIGNANT NEOPLASM METASTATIC TO BOTH LUNGS: ICD-10-CM

## 2024-05-01 DIAGNOSIS — C78.02 MALIGNANT NEOPLASM METASTATIC TO BOTH LUNGS: ICD-10-CM

## 2024-05-01 DIAGNOSIS — C20 ADENOCARCINOMA OF RECTUM: Primary | Chronic | ICD-10-CM

## 2024-05-01 DIAGNOSIS — K59.1 FUNCTIONAL DIARRHEA: ICD-10-CM

## 2024-05-01 NOTE — PROGRESS NOTES
[facility] Radiation Oncology   Follow Up    Chief Complaint  Increasing size pulmonary nodules concerning for metastatic malignancy from rectal primary       Diagnosis:    Diagnosis Plan   1. Adenocarcinoma of rectum        2. Malignant neoplasm metastatic to both lungs            Radiation Start Date: 12/7/2023     Radiation Completion Date: 12/13/2023        Prescription:      Simultaneous Integrated Boost     Site: Right upper lobe lung  Laterality: Right  Total Dose: 5000 cGy  Dose per Fraction: 1000 cGy  Total Fractions: 5  Daily or BID: Daily  Modality: Photon  Technique: SBRT (2-5fx)  Bolus: No        2.   Site: Left upper lobe lung  Laterality: Left  Total Dose: 5000 cGy  Dose per Fraction: 1000 cGy  Total Fractions: 5  Daily or BID: Daily  Modality: Photon  Technique: SBRT (2-5fx)  Bolus: No        Interval History:    Carole Weaver presents for routine follow-up to discuss her recent imaging.    The patient reports tolerating treatment well. She is interested in reviewing her recent imaging.        Imaging:      CT Chest With Contrast Diagnostic    Result Date: 4/25/2024  No significant change. Continued follow-up advised as indicated.  This report was finalized on 4/25/2024 11:37 AM by Dr. Petey Meier M.D on Workstation: [id number]      CT Abdomen Pelvis With Contrast    Result Date: 4/25/2024  No significant change. Continued follow-up advised as indicated.  This report was finalized on 4/25/2024 11:37 AM by Dr. Petey Meier M.D on Workstation: [id number]      CT Chest With Contrast Diagnostic    Result Date: 2/2/2024   1. Several of the previous pulmonary nodules show small interval decrease. One left lower lobe pulmonary nodule appears stable.  2. Stable presacral soft tissue density.  Continued follow-up advised as indicated.  This report was finalized on 2/2/2024 11:36 AM by Dr. Petey Meier M.D on Workstation: [id number]      CT Abdomen Pelvis With Contrast    Result Date: 2/2/2024   1.  Several of the previous pulmonary nodules show small interval decrease. One left lower lobe pulmonary nodule appears stable.  2. Stable presacral soft tissue density.  Continued follow-up advised as indicated.  This report was finalized on 2/2/2024 11:36 AM by Dr. Petey Meier M.D on Workstation: [id number])           Labs:    Lab Results   Component Value Date    CREATININE 0.83 04/23/2024               Problem List:  Patient Active Problem List   Diagnosis    Anxiety    Irritable bowel syndrome    Monopolar depression    Adenocarcinoma of rectum    Family history of factor V Leiden mutation    Heterozygous factor V Leiden mutation    Encounter for fitting and adjustment of vascular catheter    Functional diarrhea    Adverse effect of antineoplastic and immunosuppressive drugs, initial encounter    Hypokalemia    Hypomagnesemia    Other pulmonary embolism without acute cor pulmonale    Kidney stone on right side    Hydronephrosis with ureteropelvic junction (UPJ) obstruction    Partial intestinal obstruction, unspecified as to cause    Irregular heart beat    Gastrointestinal hemorrhage, unspecified    Esophagitis    Family history of colonic polyps    Chronic diarrhea    Calculus of gallbladder    Benign neoplasm of transverse colon    Benign neoplasm of rectum and anal canal    Loss of weight    Heart murmur    Anemia    Anticoagulation adequate    Iron deficiency    Adverse effect of iron    Drug-induced peripheral neuropathy    On antineoplastic chemotherapy    Chemotherapy-induced thrombocytopenia    HTN (hypertension)    Chronic anticoagulation    Chemotherapy-induced neutropenia    Hand foot syndrome    Pulmonary nodule, right    Perirectal abscess    Pulmonary nodules    Secondary cancer of lung    Leukocytopenia    Thrombosis of superior vena cava    Tachycardia    History of rectal cancer    Macrocytosis without anemia    Right ureteral stone    Pulmonary embolism without acute cor pulmonale     Palmar-plantar erythrodysesthesia    On anticoagulant therapy    Lump of right breast    Kidney stones    HPV in female    History of radiation therapy    History of gestational diabetes    History of chemotherapy    History of anemia    Hemorrhoids    GERD (gastroesophageal reflux disease)    Fistula of intestine    Colon polyps    Cervical lymphadenitis    Bilateral epiphora    Family history of colon cancer    Neck pain on left side    Partial small bowel obstruction    History of pulmonary embolism          Medications:  Current Outpatient Medications on File Prior to Visit   Medication Sig Dispense Refill    clonazePAM (KlonoPIN) 1 MG tablet Take 1 tablet as needed daily for anxiety. May take one additional as needed for severe anxiety. 45 tablet 4    Cyanocobalamin (VITAMIN B-12 PO) Take 1 tablet by mouth Every Other Day.      dicyclomine (BENTYL) 20 MG tablet Take 1 tablet by mouth Every 6 (Six) Hours As Needed (for irritable bowel symptoms). Indications: Irritable Bowel Syndrome 360 tablet 2    diphenoxylate-atropine (LOMOTIL) 2.5-0.025 MG per tablet TAKE 2 TABLETS BY MOUTH 4 TIMES A  tablet 3    Enoxaparin Sodium (LOVENOX) 100 MG/ML solution prefilled syringe syringe INJECT 1 ML UNDER THE SKIN INTO THE APPROPRIATE AREA AS DIRECTED EVERY 12 (TWELVE) HOURS. 60 mL 2    escitalopram (LEXAPRO) 10 MG tablet TAKE 1 TABLET BY MOUTH EVERY DAY 90 tablet 1    famotidine (PEPCID) 20 MG tablet TAKE 1 TABLET BY MOUTH TWICE A  tablet 2    Imvexxy Maintenance Pack 10 MCG insert Insert 10 mcg into the vagina 2 (Two) Times a Week.      Loperamide HCl (IMODIUM PO) Take  by mouth As Needed.      Loratadine 10 MG capsule Take 1 capsule by mouth Every Evening.      metoprolol succinate XL (TOPROL-XL) 25 MG 24 hr tablet TAKE 0.5 TABLETS BY MOUTH EVERY NIGHT 45 tablet 2    ondansetron (ZOFRAN) 8 MG tablet TAKE 1 TABLET BY MOUTH THREE TIMES A DAY AS NEEDED FOR NAUSEA AND VOMITING 60 tablet 1     No current  "facility-administered medications on file prior to visit.          Allergies:  No Known Allergies        Vital Signs:  /81   Pulse 76   Wt 91.4 kg (201 lb 6.4 oz)   SpO2 96%   BMI 33.51 kg/m²   Estimated body mass index is 33.51 kg/m² as calculated from the following:    Height as of 3/22/24: 165.1 cm (65\").    Weight as of this encounter: 91.4 kg (201 lb 6.4 oz).  Pain Score    05/01/24 1001   PainSc: 0-No pain         ECOG: Fully active, able to carry on all pre-disease performance without restriction = 0    Physical Exam  Constitutional:       General: She is not in acute distress.  HENT:      Head: Normocephalic and atraumatic.   Pulmonary:      Effort: Pulmonary effort is normal. No respiratory distress.   Neurological:      Mental Status: She is alert and oriented to person, place, and time. Mental status is at baseline.   Psychiatric:         Mood and Affect: Mood normal.         Behavior: Behavior normal.            Result Review :  The following data was reviewed by: Alverto Stephen MD on 02/09/2024:    Data reviewed : Radiologic studies CT chest, abdomen, and pelvis             Diagnoses and all orders for this visit:    1. Adenocarcinoma of rectum (Primary)    2. Malignant neoplasm metastatic to both lungs        Assessment:    Carole Weaver is a 43 y.o. female with history of stage IIIb, [id number], rectal adenocarcinoma diagnosed in 2019 sp neoadjuvant chemoradiation followed by surgical resection of the rectal primary on 12/2/2019.  Staging at time of surgery was stage IIIB, [id number], rectal adenocarcinoma. Patient had pulmonary nodules concerning for imaging diagnosed metastatic disease. The patient was treated with SBRT, 50 Gy in 5 fractions, to both a left and right upperr lobe lesion completed on 12/13/2023. She returns for imaging surveillance 5 months post SBRT.     Plan:    Orders:  - Repeat imaging in 3 months  - Return visit after imaging to discuss results    We reviewed the " patient's recent post treatment imaging results. There is no evidence of growth in the treated lung nodules and the sub centimeter nodules that were not treated are stable. We will continue to monitor and will schedule follow-up in 3 months to review her next set of images.     Patient is agreeable to see me at [facility]. Follow-up will be scheduled there.           I spent 30 minutes caring for Carole on this date of service. This time includes time spent by me in the following activities:preparing for the visit, reviewing tests, obtaining and/or reviewing a separately obtained history, performing a medically appropriate examination and/or evaluation , counseling and educating the patient/family/caregiver, documenting information in the medical record, and independently interpreting results and communicating that information with the patient/family/caregiver  Follow Up   No follow-ups on file.  Patient was given instructions and counseling regarding her condition or for health maintenance advice. Please see specific information pulled into the AVS if appropriate.     Alverto Stephen MD

## 2024-05-02 RX ORDER — DICYCLOMINE HCL 20 MG
TABLET ORAL
Qty: 360 TABLET | Refills: 2 | Status: SHIPPED | OUTPATIENT
Start: 2024-05-02

## 2024-05-02 NOTE — TELEPHONE ENCOUNTER
Rx Refill Note  Requested Prescriptions     Pending Prescriptions Disp Refills    dicyclomine (BENTYL) 20 MG tablet [Pharmacy Med Name: DICYCLOMINE 20 MG TABLET] 360 tablet 2     Sig: TAKE 1 TABLET BY MOUTH EVERY 6 (SIX) HOURS AS NEEDED FOR IRRITABLE BOWEL SYMPTOMS      Last office visit with prescribing clinician: 3/22/2024  Next office visit with prescribing clinician: 6/24/2024         Erika Thakur MA  05/02/24, 09:52 EDT      [facility] patient

## 2024-05-03 ENCOUNTER — INFUSION (OUTPATIENT)
Dept: ONCOLOGY | Facility: HOSPITAL | Age: 44
End: 2024-05-03
Payer: COMMERCIAL

## 2024-05-03 ENCOUNTER — LAB (OUTPATIENT)
Dept: LAB | Facility: HOSPITAL | Age: 44
End: 2024-05-03
Payer: COMMERCIAL

## 2024-05-03 ENCOUNTER — OFFICE VISIT (OUTPATIENT)
Dept: ONCOLOGY | Facility: CLINIC | Age: 44
End: 2024-05-03
Payer: COMMERCIAL

## 2024-05-03 VITALS
WEIGHT: 203.4 LBS | SYSTOLIC BLOOD PRESSURE: 116 MMHG | HEART RATE: 86 BPM | RESPIRATION RATE: 18 BRPM | TEMPERATURE: 97.7 F | OXYGEN SATURATION: 99 % | DIASTOLIC BLOOD PRESSURE: 74 MMHG | HEIGHT: 65 IN | BODY MASS INDEX: 33.89 KG/M2

## 2024-05-03 DIAGNOSIS — Z45.2 ENCOUNTER FOR FITTING AND ADJUSTMENT OF VASCULAR CATHETER: Primary | ICD-10-CM

## 2024-05-03 DIAGNOSIS — C20 ADENOCARCINOMA OF RECTUM: Chronic | ICD-10-CM

## 2024-05-03 DIAGNOSIS — R91.8 MULTIPLE PULMONARY NODULES: ICD-10-CM

## 2024-05-03 DIAGNOSIS — C78.02 MALIGNANT NEOPLASM METASTATIC TO BOTH LUNGS: ICD-10-CM

## 2024-05-03 DIAGNOSIS — C78.01 MALIGNANT NEOPLASM METASTATIC TO BOTH LUNGS: ICD-10-CM

## 2024-05-03 DIAGNOSIS — Z79.01 ANTICOAGULATION ADEQUATE: ICD-10-CM

## 2024-05-03 DIAGNOSIS — C20 ADENOCARCINOMA OF RECTUM: Primary | ICD-10-CM

## 2024-05-03 DIAGNOSIS — D68.51 HETEROZYGOUS FACTOR V LEIDEN MUTATION: ICD-10-CM

## 2024-05-03 PROCEDURE — 25010000002 HEPARIN LOCK FLUSH PER 10 UNITS: Performed by: INTERNAL MEDICINE

## 2024-05-03 PROCEDURE — 99214 OFFICE O/P EST MOD 30 MIN: CPT | Performed by: INTERNAL MEDICINE

## 2024-05-03 PROCEDURE — 96523 IRRIG DRUG DELIVERY DEVICE: CPT

## 2024-05-03 RX ORDER — ENOXAPARIN SODIUM 100 MG/ML
1 INJECTION SUBCUTANEOUS EVERY 12 HOURS SCHEDULED
Qty: 60 ML | Refills: 2 | Status: SHIPPED | OUTPATIENT
Start: 2024-05-03

## 2024-05-03 RX ORDER — SODIUM CHLORIDE 0.9 % (FLUSH) 0.9 %
10 SYRINGE (ML) INJECTION AS NEEDED
Status: DISCONTINUED | OUTPATIENT
Start: 2024-05-03 | End: 2024-05-03 | Stop reason: HOSPADM

## 2024-05-03 RX ORDER — SODIUM CHLORIDE 0.9 % (FLUSH) 0.9 %
10 SYRINGE (ML) INJECTION AS NEEDED
OUTPATIENT
Start: 2024-05-03

## 2024-05-03 RX ORDER — HEPARIN SODIUM (PORCINE) LOCK FLUSH IV SOLN 100 UNIT/ML 100 UNIT/ML
500 SOLUTION INTRAVENOUS AS NEEDED
OUTPATIENT
Start: 2024-05-03

## 2024-05-03 RX ORDER — HEPARIN SODIUM (PORCINE) LOCK FLUSH IV SOLN 100 UNIT/ML 100 UNIT/ML
500 SOLUTION INTRAVENOUS AS NEEDED
Status: DISCONTINUED | OUTPATIENT
Start: 2024-05-03 | End: 2024-05-03 | Stop reason: HOSPADM

## 2024-05-03 RX ADMIN — Medication 10 ML: at 14:40

## 2024-05-03 RX ADMIN — Medication 500 UNITS: at 14:41

## 2024-05-03 NOTE — PROGRESS NOTES
How could I venturaeli      REASON FOR FOLLOW UP:     Rectal cancer, rectal ultrasound examination in July 2019 reported T3N0 disease without lymphadenopathy. She does have small pulmonary nodule 6-7 mm and 2 mm with indeterminate feature. There is no solid evidence of metastatic disease otherwise. Patient has stage IIA (T3N0M0) disease.  The patient is heterozygous factor V Leiden, was on prophylactic anticoagulation with Lovenox 40 mg daily given her increased risk of thrombosis with MediPort and GI malignancy.   PET scan on 8/8/2019 reported an 8 mm hypermetabolic right upper lobe pulmonary nodule, which is suspicious for metastatic as well.    Patient had a PowerPort placement on the left upper chest by Dr. Joseph on 8/9/2019.  Patient was started on concurrent infusional 5-FU chemoradiation therapy on 8/12/2019, with planned complete surgical resection and further adjuvant chemotherapy with intention to cure the disease.   Patient underwent surgical resection of the primary rectal cancer by Dr. Ye on 12/2/2019.  Pathology evaluation reported residual T3N1 disease stage IIIb.  Diarrhea related to therapy and radiation.   Patient was started cycle 1 day 1 of adjuvant FOLFOX 6 on 1/23/2020.  On 2/5/2020 FOLFOX 6 cycle 2 delayed secondary to neutropenia.  Patient was given 3 days of Granix injection.  After cycle #2 we planned 3 days of Granix with each cycle.   Patient also intolerant of oral iron.  Patient received 2 doses of IV injectafer on 02/05/2020 and 02/12/2020.   02/12/2020 Proceed with cycle #2 of FOLFOX 6 with 3 days of Granix.  On 3/11/2020 cycle 4 postponed secondary to abdominal pain and occasional low-grade fevers.  CT scans ordered.  Cycle #4 resumed after CT scan revealed no evidence of disease.  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 Ye. patient has no fever.  Continue to observe.   Grade 3 hand-foot syndrome secondary to  5-FU after cycle #7 FOLFOX 6 chemotherapy, prompting ER visit.  Also has worsening peripheral neuropathy.   Cycle #8 FOLFOX 6 was given on 5/13/2020, with 50% dose reduction for both 5-FU and oxaliplatin, and also examination of bolus 5-FU.   PET scan examination on 5/21/2020 reported no evidence of metastatic disease in the chest abdomen pelvis.  Stable 6 mm RUL pulmonary nodule.  On 5/27/2020, I discussed with the patient that we can discontinue chemotherapy at this time.   Patient had a surgical procedure for low anterior colon resection, coloanal anastomosis on 8/3/2020.  CT scan for chest abdomen pelvis on 9/9/2020 reported a new 8 mm noncalcified pulmonary nodule in the left lower lobe of the lung.  Otherwise stable right upper lobe 6 mm pulmonary nodule, and no evidence of disease recurrence in the abdomen or pelvis.  PET scan examination on 9/18/2020 reported multiple hypermetabolic small pulmonary nodules/ new pulmonary nodules.   Patient was started on pelvic chemotherapy with FOLFIRI regimen on 10/13/2020.   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 It reported tumor mutation burden 5 Muts/Mb, microsatellite stable, TP53 mutation R282W, and others.   Cycle #5 was without irinotecan, due to peripheral neuropathy.  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  Patient was switched to weight-based Lovenox injection.  Cycle #6 5-FU and irinotecan was delayed by 2 weeks because of the above incident.  Patient had a telemedicine evaluation at that the [facility] in February 2021.  They agreed with our treatment plan for palliative chemotherapy followed by maintenance chemotherapy.    PET scan examination on 4/6/2021 after cycle #12 palliative chemotherapy reported no evidence of hypermetabolic metastatic lesion.   Patient was started on maintenance  chemotherapy with 5-FU and Avastin on 4/13/2021. (Unable to tolerate Xeloda because of a significant hand-foot syndrome).   PET scan on 9/24/2021 obtained after cycle #12 maintenance chemotherapy with 5-FU, leucovorin, Avastin reported no evidence for active disease. We recommended 3 months chemotherapy holiday.  CT scan examination on 3/15/2022 reported slightly disease progression with increase in size of small pulmonary nodule.  We started patient back on maintenance chemotherapy on 3/29/2022 treatment with 5-FU plus leucovorin and Avastin, repeating every 2 weeks.  After 6 more maintenance chemotherapy, CT scan for chest abdomen pelvis was done on 6/21/2022 which reported stable sub-6 mm pulmonary nodules.  Patient had last maintenance chemotherapy on 6/7/2022.       HISTORY OF PRESENT ILLNESS:  The patient is a 43 y.o. female with the above-mentioned issue who presents for a 3-month follow-up evaluation, after laboratory studies and CT scan for chest, abdomen, pelvis obtained on 4/23/2025. Unfortunately, we could not obtain the Guardant reveal study then due to the timing.    The patient reports a satisfactory performance status, with ECOG 0. She continues to experience intermittent loose bowel movements, but denies any presence of melena or hematochezia. She also denies experiencing any abdominal pain, nausea, or vomiting. Furthermore, she denies experiencing any chest pain or dyspnea.    CT for chest abdomen pelvis with IV contrast on 4/23/2024 reported no evidence for disease progression.    Laboratory studies on 4/23/2021 reported normal CMP, and CBC together with a normal CEA 1.25 ng/mL.    Past Medical History:   Diagnosis Date    Abdominopelvic abscess 08/12/2020    ADMITTED TO [facility]    Abnormal Pap smear of cervix 02/02/1998    JULIUS I    Abscess of abdominal wall 11/28/2012    SEEN AT [facility] ER    Anemia in pregnancy     Anxiety     Bilateral epiphora 04/2020    Bilateral hand swelling 05/02/2020    SEEN AT  [facility] ER    Cervical lymphadenitis 06/06/2012    SEEN AT [facility] ER    Chemotherapy induced diarrhea 01/2021    Chemotherapy induced neutropenia 04/2020    Chemotherapy-induced nausea 02/2020    Chemotherapy-induced thrombocytopenia 05/2020    Chronic anticoagulation     Chronic diarrhea     Colon polyps     FOLLOWED BY DR. 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 ALEXANDRU HUNT    History of gestational diabetes     History of pre-eclampsia 1998    History of pre-eclampsia     History of radiation therapy 2019    FOLLOWED BY DR. 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    Perirectal  abscess 2020    Pulmonary embolism without acute cor pulmonale 09/20/2019    X 3; ADMITTED TO [facility]    Pulmonary nodule, right 2020    Rectal cancer 2019    STAGE IIA, INVASIVE MODERATELY DIFFERENTIATED ADENOCARCINOMA, CHEMO AND XRT FINISHED 2019    Right shoulder pain 2020    SEEN AT [facility] ER    Right ureteral stone 10/01/2019    SEEN AT [facility] ER    SAB (spontaneous ) 1996     A2-1 INDUCED    Sciatica     Sepsis due to cellulitis 2002    LEFT GREAT TOE, ADMITTED TO [facility]    Tachycardia 2020    Thrombosis of superior vena cava 2020    AND BRACHIOCEPHALIC VEIN, ADMITTED TO [facility]    Urinary urgency 2020   Patient had COVID-19 infection diagnosed on 2022 despite was fully vaccinated and boosted.  She recovered without problem.      Past Surgical History:   Procedure Laterality Date    ABDOMINAL SURGERY      CHOLECYSTECTOMY N/A 10/10/2003    LAPAROSCOPIC WITH CHOLANGIOGRAM, DR. JAMEY TALAVERA AT [facility]    COLON RESECTION N/A 2019    Procedure: laparoscopic low anterior colon resection with mobilization of splenic flexure and diverting loop ileostomy: ERAS;  Surgeon: Padmaja Esparza MD;  Location: [facility];  Service: General    COLON RESECTION N/A 2020    Procedure: LOW ANTERIOR COLON RESECTION, COLOANAL ANASTOMOSIS, MOBILIZATION SPLENIC FLEXURE;  Surgeon: Padmaja Esparza MD;  Location: [facility];  Service: General;  Laterality: N/A;    COLONOSCOPY N/A 07/15/2020    PATENT ANASTAMOSIS IN MID RECTUM, RESCOPE IN 1 YR, DR. PADMAJA ESPARZA AT [facility]    COLONOSCOPY N/A 2022    DIFFUSE AREA OF MODERATELY FRIABLE MUCOSA IN ENTIRE COLON , DIVERSION COLITIS, PATENT AND HEALTHY ANASTAMOSIS, DR. PADMAJA ESPARZA AT [facility]    COLONOSCOPY N/A 2023    6 MM SESSILE SERRATED ADENOMA POLYP IN DESCENDING, PATENT ANASTAMOSIS IN DISTAL RECTUM, RESCOPE IN 2 YRS, DR. PADMAJA ESPARZA AT [facility]    COLONOSCOPY W/ POLYPECTOMY N/A 2019    15 MM TUBULOVILLOUS ADENOMA  POLYP IN HEPATIC FLEXURE, 20 MMTUBULOVILLOUS ADENOMA WITH HIGH GRADE DYSPLASIA IN RECTOSIGMOID, 4 CM MASS IN MID RECTUM, PATH: INVASIVE MODERATELY DIFFERENTIATED ADENOCARCINOMA, DR. JENNIFER LI AT [facility]    DILATATION AND EVACUATION N/A 2009    ENDOSCOPY N/A 07/08/2019    LA GRADE A ESOPHAGITIS, GASTRITIS, ALL BIOPSIES BENIGN, DR. JENNIFER LI AT [facility]    ILEOSTOMY CLOSURE N/A 11/14/2022    Procedure: ILEOSTOMY TAKEDOWN;  Surgeon: Geronimo Ye MD;  Location: [facility] OR;  Service: General;  Laterality: N/A;    INCISION AND DRAINAGE PERIRECTAL ABSCESS N/A 08/14/2020    Procedure: INCISION AND DRAINAGE OF retrorectal dehiscence abcess with drain placement and irrigation;  Surgeon: Geronimo Ye MD;  Location: [facility] OR;  Service: General;  Laterality: N/A;    PAP SMEAR N/A 01/24/2014    SIGMOIDOSCOPY N/A 07/24/2019    INFILTRATIVE PARTIALLY OBSTRUCTING LARGE RECTAL CANCER, AREA TATOOED, DR. GERONIMO YE AT [facility]    SIGMOIDOSCOPY N/A 11/23/2019    AN ULCERATED PARTIALLY OBSTRUCTING MASS IN MID RECTUM, AREA TATTOOED, DR. GERONIMO YE AT [facility]    SIGMOIDOSCOPY N/A 07/22/2021    PATENT ANASTAMOSIS IN DISTAL RECTUM, RESCOPE IN 1 YR, DR. GERONIMO YE AT [facility]    TRANSRECTAL ULTRASOUND N/A 07/24/2019    Procedure: ULTRASOUND TRANSRECTAL;  Surgeon: Geronimo Ye MD;  Location: [facility] ENDOSCOPY;  Service: General    URETEROSCOPY LASER LITHOTRIPSY WITH STENT INSERTION Right 10/30/2019    DR. ESTUARDO BEASLEY AT [place]    VAGINAL DELIVERY N/A 09/18/1998    VENOUS ACCESS DEVICE (PORT) INSERTION Left 08/09/2019    Procedure: INSERTION VENOUS ACCESS DEVICE;  Surgeon: Sj Joseph MD;  Location: [facility];  Service: General    VENOUS ACCESS DEVICE (PORT) INSERTION N/A 12/18/2020    Procedure: INSERTION VENOUS ACCESS DEVICE right side, removal venous access device left side;  Surgeon: Sj Joseph MD;  Location: [facility];  Service: General;  Laterality: N/A;    LYNN  TOOTH EXTRACTION Bilateral 1993       HEMATOLOGIC/ONCOLOGIC HISTORY:      The patient reports she has intermittent rectal bleeding and urgency, mucous with her stool, starting sometime in 2016. At that time she was referred to GI service, and was diagnosed of irritable bowel syndrome. Nevertheless she had worsening urgency for bowel movement, and had a couple of incidents losing control of stool. She was recently seen by Roland Thorpe MD again and had colonoscopy and EGD exam on 07/08/2019. She was found having a circumferential rectal mass. According to the procedure note, the patient had a fungating circumferential bleeding 4 cm mass in the middle rectum, at distance between 13 cm and 17 cm from the anus. Mass was causing partial obstruction. There were also colon polyps found at the hepatic flexure and also at the rectosigmoid junction 23 cm from the anus. Both were resected and retrieved. EGD examination reported grade A esophagitis at the GE junction and patchy discontinuous erythema and aggravation of the mucosa without bleeding in the stomach body. There is normal mucosa of the duodenum. Pathology evaluation from this procedure reported moderately differentiated adenocarcinoma involving the rectal mass. The rectal sigmoid junction polyp was tubular/tubulovillous adenoma with high grade dysplasia negative for invasive malignancy. The hepatic flexure polyp was also tubular/tubulovillous adenoma negative for high grade dysplasia or malignancy. The biopsy from the esophagus reports squamocolumnar mucosa with inflammatory changes suggestive of mild reflux esophagitis, negative for interstitial metaplasia. Gastric biopsy was benign and duodenal biopsy was also benign. There is no mention of H-pylori.     Patient was subsequently referred to colorectal surgeon Padmaja Ye MD for further evaluation. The patient had CT scan examination for chest, abdomen and pelvis requested by Dr. Ye and were done on 07/13/2019.  The study reported no evidence of lymphadenopathy in the abdomen and pelvis. There was wall thickening of the rectosigmoid junction. Normal spleen, pancreas, adrenal glands and kidneys. There was fatty liver infiltration but no focal lymphatic lesions. There was a small 6-7 mm noncalcified nodule in the right upper lobe and a tiny 3 mm subpleural nodule in the right middle lobe. No mediastinal or hilar lymphadenopathy.     Dr. Ye performed staging rectal ultrasound examination. This study reported tumor penetrating through the muscularis propria as T3 disease; there were no lymph nodes identified.    She had a hospitalization in late September 2019 because of newly discovered pulmonary emboli.  She was on prophylactic Lovenox prior to the incident of PE.  Now she is on full dose Xarelto anticoagulation.  Patient reports no further chest pain dyspnea.  She denies bleeding or bruising.  During her hospitalization, she was seen by Dr. Ye, who plans to have surgery 8 to 12 weeks after finishing radiation therapy.  She finished her radiation on 9/19/2019.     I noticed patient also presented to the emergency room on 10/1/2019 complaining of right flank area pain.  I reviewed the images studies and indeed she has a very small 1 to 2 mm obstructing kidney stone in the UV junction.  Patient is still symptomatic with some pains and dysuria.  She denies fever sweating or chills.    Laboratory study on 10/7/2019 reported normal CBC including hemoglobin 13.1, platelets 301,000, WBC 6170 and ANC 4900 lymphocytes 590 monocytes 480.      The nurse reported malfunction of port-a-catheter on 10/7/2019.  Port study on 10/8/2019 showed fibrin sheath around the distal aspect of the Mediport catheter in the SVC. This does not appear to hinder injection, but does prevent aspiration at this time.    Patient underwent surgical resection of the colon on 12/2/2019 with Dr. Ye.  Pathology evaluation reported residual T3 disease, also  1 out of 14 lymph nodes positive for malignancy.  So this patient in either had at least stage IIIb disease (T3 N1 M0?).      Adjuvant chemotherapy FOLFOX 6 will be started on 1/22/2020.  Laboratory study reported iron saturation 10%, free iron 31 TIBC 319 and ferritin 168.  Her hemoglobin was 11.8, WBC 5600, and platelets 347,000.    Patient was here on 02/12/2020 for cycle 2 of her FOLFOX.  This is delayed x1 week secondary to neutropenia.  The patient ultimately received 3 days worth of Neupogen with recovery of her blood counts.  Of note, the patient struggled with significant nausea without any episodes of vomiting following cycle #1 of chemotherapy resulting in significant weight loss.  She is up 12 pounds over the last week as her appetite has normalized.  We will add Emend to her care plan.    She is having several loose stools per her colostomy and has been hesitant to take Imodium due to prior history of constipation.  I encouraged her to try this with a maximum of 8 tablets/day.  She denies any infectious symptoms including fevers or chills.  No excess bleeding or bruising noted.  She had the expected cold sensitivity related to the Oxaliplatin for about 3 days following treatment.    Labs from 02/12/2020 demonstrated total white blood cell count of 5.14, ANC of 3.06, hemoglobin of 11.2, platelets of 211,000.  She was found to be iron deficient last week and is intolerant to oral iron secondary to GI distress.  For this reason, she initiated IV iron therapy with Injectafer last week.  She had received her second dose 02/12/2020    Patient has normalized hemoglobin 12.2 on 2/26/2020.    She reported on 5/5/2020 she had a recent visit to the emergency department for what was suspected to be an allergic reaction.  She called our on-call service on Saturday with reports of hand and face swelling.  She was instructed to proceed to the emergency department at which time she was assessed and prescribed a Medrol  Dosepak.  She had just completed her 5-FU and was unhooked on Friday, 5/3/2020.  Her symptoms have improved.  She does report persistent hyperpigmentation and mild swelling of the palms of the hands but this is much improved.  It was her right hand specifically that was swollen.  Her facial swelling has resolved.  She continues on Cefdinir nd since has 1 day remaining, she was prescribed cefdinir for a UTI requiring hospitalization at the end of April.  Reports no new symptoms.  Her labs are stable.  She is scheduled for treatment again.    Patient states at this time she is not tolerating her chemotherapy well.     Patient seen in the emergency department on 5/2/2020 for what was suspected to be an allergic reaction.  She called our on-call service on Saturday explaining that she was experiencing hand and face swelling.  She was instructed to proceed to the emergency department at which time she was assessed and prescribed a Medrol Dosepak.  She had just completed her 5-FU and was unhooked on Friday, 5/1/2020.      She reports since her ED visit on 5/2/2020 her symptoms have not improved. Her hands and feet were swollen upon presenting to the ED and she could barely make a fist. She states that she feels so swollen she is not able to stand it. She states that her skin on the hands and feet are peeling extensively as well, besides changing color to more dark.     She also reports significant fatigue after her first week of the 5-FU treatment but she expected this side effect. She also notices significant increase in her neuropathy to the point that she is not able to even walk around in her home without her house slippers due to irritation from her rugs. She denies and associated nausea or vomiting at this time. She also has episodes of epistaxis every day after the chemotherapy cycle #7.  She does report working full-time during the week of chemotherapy.    Laboratory studies, 5/13/2020, show moderate  thrombocytopenia platelets 123,000, low normal WBC 4140 including ANC 2720 and normal hemoglobin 13.4.  Because significant hand-foot syndrome, will decrease both 5-FU and oxaliplatin by 50%, and eliminate bolus dose of 5-FU.    On 5/21/2020 patient had a PET scan performed which showed no convincing evidence of residual disease in abdomen or pelvis, no metastatic disease within the chest or neck.     Cycle #8 FOLFOX 6 was given on 5/13/2020, with 50% dose reduction for both 5-FU and oxaliplatin, and also examination of bolus 5-FU.  She states on 5/27/2020 that with the recent reduction of the chemotherapy she feels significantly better. She has more energy and is able to do her daily routine.      PET scan examination on 5/21/2020 reported no evidence of metastatic disease in chest abdomen pelvis.  There was a stable 6 mm right upper lobe pulmonary nodule.    Laboratory studies on 5/27/2020 showed mild leukocytopenia WBC 3720 but a normal ANC 2250 and lymphocytes 630.  Normal platelets 163,000 and hemoglobin 12.6.  Chemistry lab reported normal renal function, liver function panel and a electrolytes, elevated glucose 164.    Laboratory studies 6/24/2020, showed normal hemoglobin 13.4 but macrocytosis .9.  Normal platelets 210,000 and WBC 5870.  She had normal CMP.     Patient last time was here in late June 2020.  Since that time she had surgical procedure for low anterior colon resection, coloanal anastomosis on 8/3/2020.  She later developed a perirectal abscess, required surgical drainage on 8/14/2020.  She was discharged on 8/27/2020.    Patient reports to me she still has lower abdominal wall vacuum suction in place.  She denies fever sweating chills.  Performance status is ECOG 1.  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  She does have visiting nurse come to the home for wound care.    CT scan for chest abdomen pelvis on 9/9/2020 reported a new 8 mm noncalcified pulmonary  nodule in the left lower lobe.  Otherwise stable right upper lobe 6 mm pulmonary nodule, and no evidence of disease recurrence in the abdomen or pelvis.     Laboratory study on 9/16/2020 reported elevated AST 55, ALT 95, alk phosphatase 143 but normal total bilirubin 0.3.  Chemistry lab otherwise unremarkable.  She has completed normal CBC.      Because of the abnormal CT scan examination for chest abdomen pelvis on 9/9/2020 reported a new 8 mm noncalcified pulmonary nodule in the left lower lobe, we obtained a PET scan examination for further evaluation, which was done on 9/18/2020.  This study reported several pulmonary nodules, some of them are hypermetabolic, newly developed compared to previous PET scan in May 2020.  Certainly does highly suspicious for metastatic disease.  There are also hypermetabolic activity in the abdomen and pelvic area which is hard to differentiate from surgical scar versus metastatic malignancy.    Laboratory study on 10/13/2020 reported normal CBC with Hb 13.9, platelets 302,000 and WBC 5520 including ANC 3830.  Chemistry lab reported normalized AST 20, alk phosphatase 116 improved ALT 35, and maintains normal bilirubin, with normal electrolytes and liver function panel.     Patient was started on first cycle of palliative chemotherapy FOLFIRI on 10/13/2020.    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  Patient had a new port placed 12/18/2020, and had held her Xarelto for 2 days prior to surgery.  She presented to the ER on 12/21/20 with complaints of facial and arm swelling for 3 days.  She also noted increased shortness of breath.  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  Thrombus within the right internal jugular vein cannot be excluded.  Patient was started on IV heparin, and  eventually transitioned to weight-based Lovenox, which she now continues.    PET scan examination on 9/24/2021 reported further shrinking of the tiny right upper lobe pulmonary nodule.  Otherwise no new lesions.  No evidence for metastatic disease in other areas.      Patient had CT scan for chest with IV contrast obtained on 12/14/2021 which reported small tiny stable pulmonary nodules. There is a 4 mm right upper lobe pulmonary nodule. There is also a stable 4 mm left lower lobe pulmonary nodule. There is also stable left upper lobe micronodule.     Laboratory studies today on 12/21/2021 reported normal hemoglobin 15.9 however .0, platelets 218,000 WBC 5360 including neutrophils 3730 lymphocytes 980. Chemistry lab reported normal renal function, electrolytes, glucose, and marginally elevated ALT 55, AST 34 but normal total bilirubin and alk phosphatase.     CT scan for chest abdomen pelvis 6/21/2022 reported sub-6 mm pulmonary nodules either stable or slightly smaller.  No new pulmonary nodules or evidence for disease progression.  There is no evidence for metastatic disease in the liver however it shows diffuse hepatic steatosis.  There was masslike thickening in the presacral space with the clips or calcification approximately 1.7 cm in greatest AP dimension but stable.    Laboratory study on 9/20/2022 reported normal hemoglobin 15.7 with mild macrocytosis .1.  She has normal CBC and platelets.  She also has unremarkable CMP.  Normal CEA 1.13 ng/mL.    Patient was seen by Dr. Ye, who performed ileostomy takedown on 11/14/2022.  Patient reports that she is recovering.  She still has a small open wound less than 1 cm in diameter, however close to 2 cm deep.  She has been changing dressing herself.  She denies fever sweating chills.    Patient has no other specific complaints.  She has excellent performance status ECOG 0.  She denies chest pain dyspnea cough hemoptysis.  No abdominal pain.  No  melena hematochezia.  Patient has been eating well, stable weight.  She works full-time.    She continues Lovenox injections and denies any significant bleeding issues.   No other new problems or concerns.     CT scan for chest abdomen pelvis obtained on 1/10/2023 reported stable small pulmonary nodules, no evidence for active or new disease.    Laboratory study on 1/10/2023 reported normal CBC and normal CMP.  CEA level is 0.99 ng/mL.    CT scan for chest, abdomen, and pelvis on 7/7/2023 reported stable small pulmonary nodules including a left upper lobe 5 mm nodule. There were no new masses, or pleural effusion. No enlarged supraclavicular, axillary, mediastinal, or hilar lymphadenopathy. Mediastinal vasculature normal. There is occlusion of the superior vena around the catheter with body wall  is present. There was no evidence for disease recurrence in the abdomen or pelvis. Bone is also negative for metastatic disease.    Laboratory studies obtained on 07/07/2023 also reported normal CBC, and normal CMP as well as low level CEA 1.07 ng/mL.    The CT scan for the chest on 10/27/2023 reported enlarging pulmonary nodules bilaterally. The right upper lobe lung nodule increased from 7 x 7 mm to 9 x 8 mm, and the left upper lobe nodule measured 8 x 9 mm from 5 x 6 mm in 04/2023. There is a different left upper lobe nodule 6 x 8 mm from previous 5 x 5 mm. The presacral tissue thickness measures up to 2.3 cm.    A laboratory study on 10/27/2023 reported CEA 1.58 ng/mL, normal CBC, and CMP.  Molecular study of peripheral blood [place] Reveal is still pending.    The patient presents today on 11/17/2023 for reevaluation to discuss the results of PET scan examination as well as the results of tumor conference. I saw her recently on 11/03/2023 and because of enlarging pulmonary nodules on the CT scan, we requested a PET scan examination. I presented her case yesterday on 11/16/2023 at the [facility]  Conference.    The patient reports she is at her baseline condition as 2 weeks ago. No specific complaints, no chest pain, dyspnea, cough, etc. She has a regular bowel movement.    Her case was present at the [facility] Conference. on 11/16/2023. The PET scan images and chest CT scan were reviewed in conjunction with her treatment history. The consensus was for patient to receive stereotactic radiation therapy for the right upper lobe lung lesion, and the bigger left upper lobe lesion, and monitor the smaller left upper lobe lesion with further images studies down the line. Also, the conference recommended [place] Reveal study which we already ordered.     This [place] study came back today as negative for ctDNA 0%.        CT of the chest on 2/2/2024 reported shrinking of the right upper lobe lesion from 9 mm to 6 mm, and the left upper lobe lesion also decreased from 9 mm to 6 mm. Otherwise, there are a couple of stable pulmonary nodules, 7 to 8 mm. The right hilar has a small lymph node that has increased from 6 mm to 8 mm and is otherwise stable. There was no suspicious lesion in the abdomen or pelvis.      MEDICATIONS    Current Outpatient Medications:     clonazePAM (KlonoPIN) 1 MG tablet, Take 1 tablet as needed daily for anxiety. May take one additional as needed for severe anxiety., Disp: 45 tablet, Rfl: 4    Cyanocobalamin (VITAMIN B-12 PO), Take 1 tablet by mouth Every Other Day., Disp: , Rfl:     dicyclomine (BENTYL) 20 MG tablet, TAKE 1 TABLET BY MOUTH EVERY 6 (SIX) HOURS AS NEEDED FOR IRRITABLE BOWEL SYMPTOMS, Disp: 360 tablet, Rfl: 2    diphenoxylate-atropine (LOMOTIL) 2.5-0.025 MG per tablet, TAKE 2 TABLETS BY MOUTH 4 TIMES A DAY, Disp: 240 tablet, Rfl: 3    Enoxaparin Sodium (LOVENOX) 100 MG/ML solution prefilled syringe syringe, INJECT 1 ML UNDER THE SKIN INTO THE APPROPRIATE AREA AS DIRECTED EVERY 12 (TWELVE) HOURS., Disp: 60 mL, Rfl: 2    escitalopram (LEXAPRO) 10 MG tablet, TAKE  1 TABLET BY MOUTH EVERY DAY, Disp: 90 tablet, Rfl: 1    famotidine (PEPCID) 20 MG tablet, TAKE 1 TABLET BY MOUTH TWICE A DAY, Disp: 180 tablet, Rfl: 2    Imvexxy Maintenance Pack 10 MCG insert, Insert 10 mcg into the vagina 2 (Two) Times a Week., Disp: , Rfl:     Loperamide HCl (IMODIUM PO), Take  by mouth As Needed., Disp: , Rfl:     Loratadine 10 MG capsule, Take 1 capsule by mouth Every Evening., Disp: , Rfl:     metoprolol succinate XL (TOPROL-XL) 25 MG 24 hr tablet, TAKE 0.5 TABLETS BY MOUTH EVERY NIGHT, Disp: 45 tablet, Rfl: 2    ondansetron (ZOFRAN) 8 MG tablet, TAKE 1 TABLET BY MOUTH THREE TIMES A DAY AS NEEDED FOR NAUSEA AND VOMITING, Disp: 60 tablet, Rfl: 1  No current facility-administered medications for this visit.    Facility-Administered Medications Ordered in Other Visits:     heparin injection 500 Units, 500 Units, Intravenous, PRN, Dong Nguyen MD PhD, 500 Units at 05/03/24 1441    sodium chloride 0.9 % flush 10 mL, 10 mL, Intravenous, PRN, Dong Nguyen MD PhD, 10 mL at 05/03/24 1440    ALLERGIES:   No Known Allergies    SOCIAL HISTORY:       Social History     Tobacco Use    Smoking status: Every Day     Current packs/day: 1.00     Average packs/day: 1 pack/day for 25.0 years (25.0 ttl pk-yrs)     Types: Electronic Cigarette, Cigarettes     Passive exposure: Current    Smokeless tobacco: Never    Tobacco comments:     1 PPD/caffeine use    Vaping Use    Vaping status: Some Days    Substances: Nicotine    Devices: Disposable    Passive vaping exposure: Yes   Substance Use Topics    Alcohol use: Not Currently     Comment: RARELY    Drug use: Never         FAMILY HISTORY:   Mother has positive factor V Leiden mutation, although she did not have thrombosis, mother also is coronary disease with stenting, she also is occasional bruising.    Maternal grandmother had DVT, she had multiple surgical procedures.    Patient mother's half-brother had metastatic colon cancer at diagnosis in his 50s.   "  Maternal great aunt had breast cancer.           Vitals:    05/03/24 1448   BP: 116/74   Pulse: 86   Resp: 18   Temp: 97.7 °F (36.5 °C)   TempSrc: Temporal   SpO2: 99%   Weight: 92.3 kg (203 lb 6.4 oz)   Height: 165.1 cm (65\")   PainSc: 0-No pain         5/3/2024     2:40 PM   Current Status   ECOG score 0     Physical Exam  Vitals reviewed.   Constitutional:       Appearance: Normal appearance. She is well-developed.   HENT:      Head: Normocephalic and atraumatic.      Comments:         Nose: Nose normal.   Eyes:      Conjunctiva/sclera: Conjunctivae normal.   Cardiovascular:      Rate and Rhythm: Normal rate and regular rhythm.      Heart sounds: Normal heart sounds.   Pulmonary:      Effort: Pulmonary effort is normal.      Breath sounds: Normal breath sounds. No wheezing.   Abdominal:      General: Bowel sounds are normal. There is no distension.      Palpations: Abdomen is soft. There is no mass.      Tenderness: There is no abdominal tenderness.   Musculoskeletal:      Right lower leg: No edema.      Left lower leg: No edema.   Lymphadenopathy:      Cervical: No cervical adenopathy.   Neurological:      Mental Status: She is alert. Mental status is at baseline.   Psychiatric:         Mood and Affect: Mood normal.         RECENT LABS:    Lab Results   Component Value Date    WBC 5.82 04/23/2024    HGB 14.0 04/23/2024    HCT 42.1 04/23/2024    MCV 94.4 04/23/2024     04/23/2024     Lab Results   Component Value Date    GLUCOSE 98 04/23/2024    BUN 9 04/23/2024    CREATININE 0.83 04/23/2024    EGFR 89.3 04/23/2024    BCR 10.8 04/23/2024    K 3.9 04/23/2024    CO2 28.2 04/23/2024    CALCIUM 9.6 04/23/2024    ALBUMIN 3.9 04/23/2024    BILITOT <0.2 04/23/2024    AST 10 04/23/2024    ALT 16 04/23/2024     Lab Results   Component Value Date    CEA 1.25 04/23/2024                        IMAGING:  CT Chest With Contrast Diagnostic, CT Abdomen Pelvis With Contrast  Narrative: CT CHEST W CONTRAST DIAGNOSTIC-, CT " ABDOMEN PELVIS W CONTRAST-     INDICATIONS: Rectal adenocarcinoma with metastatic lung nodules,  follow-up. Radiation dose reduction techniques were utilized, including  automated exposure control and exposure modulation based on body size.     TECHNIQUE: Enhanced CT of the chest, abdomen, pelvis     COMPARISON: 2/2/2024        FINDINGS:        Chest CT:        The heart size is normal without pericardial effusion. An 8 mm short  axis right hilar lymph node on axial image 34 appears stable. A few  small subcentimeter short axis mediastinal lymph nodes are seen that are  not significant by size criteria. Varicose veins are again conspicuous  in the thorax, similar to prior exam. Right chest port extends to the  superior vena cava. Tiny thyroid nodularity appears grossly stable.     The airways appear clear.     No pleural effusion or pneumothorax.     The lungs show several pulmonary nodules, that appear similar to prior  exam. For example, right upper lobe 6 mm nodule on axial image 25,  stable; left upper lobe, 5-6 mm nodule, image 31, not significantly  changed; left upper lobe, 6-7 mm on image 39, not significantly changed.  In the left lower lobe, 7 mm nodule, image 34, is stable. No suspicious  new nodules are noted. Small atelectasis is seen in both lungs.        Abdomen pelvis CT:     Gallbladder is surgically absent. Relative low-density of the liver  suggests steatosis.     Otherwise unremarkable appearance of the liver, spleen, adrenal glands,  pancreas, kidneys, bladder.     Persistent presacral soft tissue thickening appears similar to the prior  exam.     No bowel obstruction or abnormal bowel thickening is identified. Mild  colonic fecal retention is apparent.     No free intraperitoneal gas or free fluid.     Scattered small mesenteric and para-aortic lymph nodes are seen that are  not significant by size criteria.     Abdominal aorta is not aneurysmal. Aortic and other arterial  calcifications are  present.     Degenerative and chronic changes are seen in the spine. Stable sclerotic  foci of the left ischium. No acute fracture is identified. Multiple  subcutaneous densities are again seen at the level of the anterior  pelvic wall could relate to injections, with foci of subcutaneous soft  tissue gas,  correlate clinically.     Impression: No significant change. Continued follow-up advised as indicated.     This report was finalized on 4/25/2024 11:37 AM by Dr. Petey Meier M.D on Workstation: [employer] [facility]       Laboratory Studies  [place] review study conducted on 2/9/2024 showed negative 0% of CT DNA. Laboratory study on 4/23/2025 showed normal CMP, normal CBC, and low level CEA 1.25. Hemoglobin 14.0.    Imaging  CT scan of the chest, abdomen, and pelvis with IV contrast on 4/23/2024 showed a stable right upper lobe of 6 mm nodule, left upper lobe nodule 5 to 6 mm, and a separate left upper lobe nodule 6 to 7 mm. A left lower lobe 7 mm nodule was also stable. No suspicious nodules were detected. In the abdomen, there was evidence of hepatic steatosis, unremarkable appearance for liver, spleen, adrenal glands, pancreas, kidney, bladder. The presacral soft tissue thickening appears similar to the prior examination. No pathologic lymphadenopathy was observed.    ASSESSMENT:   1.  Metastatic rectal cancer.   Initial rectal ultrasound examination reported T3N0 disease without lymphadenopathy.   CT scan of chest, abdomen and pelvis reported no lymphadenopathy in the abdomen, pelvis or chest. She does have small pulmonary nodule 6-7 mm and 2 mm with indeterminate feature. There is no solid evidence of metastatic disease otherwise.   Based on the CT scan and rectal ultrasound imaging studies, this patient had stage IIA (T3N0M0) disease.   She had PET scan examination on 8/8/2019 which reported a hypermetabolic right upper lobe pulmonary nodule 6 mm with SUV 5.6.  This is suspicious for metastatic disease however  it is too small to be biopsied.  This patient may have stage IV disease.   She initiated concurrent radiation with continuous 5-FU on 8/12/2019.  Patient finished concurrent chemoradiation therapy.  Patient underwent surgical resection of the rectal tumor and diverting loop ileostomy on 12/2/2019 with Dr. Ye.  Pathology evaluation reported residual T3 disease, with 1 out of 14 lymph nodes positive for malignancy.  Certainly this patient has at least stage IIIb rectal cancer (T3N1M0?)  On 1/22/2020 adjuvant chemotherapy FOLFOX 6 regimen initiated.   On 2/5/2022 cycle #2 was delayed secondary to neutropenia.  She was given 3 days of Granix.   Emend added with cycle 3.  With improved nausea control  Continuing home Granix x3 days following 5-FU disconnect  3/11/2020 due for cycle 4, however, she is experiencing progressive abdominal pain and occasional fevers.   CT scan performed on 3/13/2020 reveals no evidence of progressive or recurrent disease.  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  Patient hospitalized 4/24-4/26/20 after cycle 5 of chemotherapy with acute UTI.  CT abdomen/pelvis noting fluid collection in the presacral region having diminished in size compared to CT dated 3/13/2020.  Patient was evaluated by Dr. Ye while in the hospital with further plans to evaluate possible colovaginal fistula following completion of chemotherapy.  Patient did respond to IV antibiotics and discharged home on oral cefdinir.  4/29/2020 cycle 6 of FOLFOX.  Urinary symptoms have resolved   Patient seen in the [facility] ED on 5/2/2020 for what was suspected to be an allergic reaction.  She called our service on Saturday explaining that she was experiencing hand and face swelling.  She was instructed to proceed to the emergency department at which time she was assessed and prescribed a Medrol Dosepak.  She had just completed her 5-FU and was unhooked on Friday, 5/1/2020.  Her symptoms have resolved in the office on  assessment, 5/5/2020.  The patient had grade 3 hand-foot syndrome based on symptomology.  Patient had cycle #8 of 5-FU on 5/13/2020. Due to her symptoms and poor tolerance to the 5-FU, her treatment dose will be reduced 50% for oxaliplatin and infusional 5-FU.  Bolus 5-FU will be discontinued..  On 5/21/2020 patient had a PET scan and it showed no evidence of of metastatic disease in the neck, chest, abdomen or pelvis.  There was fluid accumulation/abscess.   On 5/27/2020 discussed with the patient that we can discontinue chemotherapy at this time.  She will follow-up with Dr. Ye to discuss a possibility to reverse the ileostomy.  We likely will obtain anther PET scan in 3 to 4 months for reassessment.    Patient was seen by Dr. Ye on 6/19/2019 for evaluation and to discuss possible take down of her ileostomy.  She is scheduled to have a gastrografin enema on 7/2/2020 to evaluate for a fistula, and then a colonoscopy on 7/15/2020, both done by Dr. Ye.  She states that based on the above imaging and procedure findings, she may have a more extensive surgery done or just have her ileostomy reversed, which would likely be done in August 2020.  This will all be coordinated under the care of Dr. Ye.     CT scan from 09/09/2020 and also compared to her previous PET scan examination from 05/21/2020 and also the original PET scan from 08/09/2019.  The original PET scan there is a small right upper lobe pulmonary nodule 6 mm with SUV 5.6. So in the 05/2020 PET scan the nodule is still there but activity seems much less and this most recent CT scan examination also documented the preexisting small pulmonary nodule however there is a new left lower lobe pulmonary nodule 8 mm in size and I shared with the patient today this nodule is suspicious for metastatic disease. Laboratory studies reported minimal CEA level 1.32 on 09/09/2020. Liver function panel was only marginally abnormal with the ALT 45, the remaining is  normal. However laboratory study today showed worsening AST 55, ALT 95 and alkaline phosphatase 143 but is still normal, total bilirubin 0.3.   Recommended to have repeat PET scan examination for assessment because the left lower lobe pulmonary nodule is too small to be biopsied, and if the PET scan reports hypermetabolic lesion, I recommended to have stereotactic radiation therapy. Explained to patient that she is not a really good candidate to have thoracotomy for resection because she still has unhealed wound in the abdomen. Stereotactic radiation therapy will likely be a more feasible choice.   PET scan examination obtained on 9/18/2020 showed metastatic disease.  It reported a few hypermetabolic pulmonary nodules, and some new small pulmonary nodules, all of them highly suspicious for metastatic disease.  She also has hypermetabolic activity in the abdomen and pelvic area which could be related to scar tissue from her recent surgery versus metastatic disease.  Nevertheless overall picture fits with metastatic cancer.  Discussed with the patient on 9/20/2020, we reviewed the PET scan images together, and I recommended to have systematic chemotherapy, I do not think stereotactic reading therapy to the hypermetabolic lesions in the lungs warranted at this time because even those will be treated with reading therapy, she will still need systematic chemotherapy.  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  I did discuss with the patient using anti-EGFR monoclonal antibody versus anti-VEGF monoclonal antibody.     Palliative chemotherapy cycle#1 FOLFIRI was started on 10/13/2020.  No bolus 5-FU given considering previous poor tolerance.    NGS study from [facility] [facility] reported positive for [place] mutation.  Microsatellite stable, mutation burden 5/Mb.   Discussed with the patient 10/27/2020, because of the K-[place] mutation, she is not a candidate for anti-EGFR monoclonal  antibody such as Erbitux or vectibix.  She could be a candidate for anti-VEGF monoclonal antibody, however because of active wound, she is not a candidate right now at this moment.  Patient seen in the ED for acute right neck pain on 11/16/2020.  CT angiogram as well as CT of the cervical spine performed with no acute findings but notably one of her pulmonary nodules, left upper lobe, somewhat decreased in size.  Patient given prednisone pack.  Further details below.  Cycle #6 chemotherapy will be resumed on 1/5/2021.  Started back on original irinotecan.  PET scan examination obtained on 1/12/2021 after cycle #6 chemotherapy reported improved pulmonary nodule hypermetabolic activity.  Confirmed these are metastatic lesions.  Patient is evaluated 1/19/2020, will continue cycle #7 FOLFIRI chemotherapy.  However Avastin will be on hold see next section.   Patient was reevaluated on 2/2/2021, will continue chemotherapy cycle #8 FOLFIRI.  Avastin / biosimilar will be added.    She talked to [facility] specialist in mid February 2021, and had recommended palliative chemotherapy without surgical intervention of metastatic lung lesions.   On 3/2/2021, patient will proceed with cycle #10 FOLFIRI plus bevacizumab.  After 12 cycles, plan to start maintenance treatment.  3/16/2021 cycle 11.  Plus bevacizumab.  3/30/2021 cycle 12 FOLFIRI plus bevacizumab.  Having issues with worsening nausea despite premeds with dexamethasone, Aloxi, Emend, and Zofran at home.  Patient is requesting a dose of Phenergan, which is helped her in the past.  We will give this to her with treatment today.  PET scan examination on 4/6/2021 reported no evidence of hypermetabolic metastatic lesion.  Discussed with the patient on 4/13/2021, we reviewed the PET scan results.  We recommended to switch to maintenance chemotherapy without irinotecan.  We will continue 5-FU and Avastin every 2 weeks.  We discussed possibility of  switching to oral Xeloda treatment.  Discussed with the patient for side effects more so with hand-foot syndrome.  Patient is agreeable.   Xeloda 2000 mg twice daily 7 days on, 7 days off along with Avastin initiated 4/27/2021.  After only 5 doses of Xeloda significant hand-foot syndrome, Xeloda held. Patient requesting to be transitioned back to infusional 5-FU, as she felt that she tolerated infusional 5-FU without any problem.  The patient will receive 5-FU leucovorin and Avastin every 2 weeks.  Xeloda discontinued.  5/25/2021 continued cycle #4 maintenance 5-FU, leucovorin, Avastin tolerating this much better so far, we will continue this. Recommended to have 12 cycles of maintenance chemotherapy, then obtain PET scan for reevaluation.  We could discuss further treatment plan at that time.  9/14/2021 patient due for cycle 12 of additional maintenance 5-FU/leucovorin plus Avastin.  She continues to tolerate treatment well overall.  She is anxious in the office today with her Mediport not getting blood at first and also with upcoming scans being heavy on her mind.  She was instructed to take one of her Klonopin pills while here to help calm her mind.  Heart rate did improve from 140s down to 112.  Proceed with treatment today as scheduled.  PET scan examination on 9/24/2021 reported further shrinking of the right upper lobe tiny pulmonary nodule.  No other new lesions.  Discussed with patient on 9/24/2021 about further shrinking of the right upper lobe pulmonary nodule and no evidence for other new lesions. We recommended to have chemo break for 3 months with repeated CT scan for reevaluation.  Recent CT scan for chest 12/14/2021 and abdomen CT from 11/29/2021 reported no evidence for active disease. The right upper lobe pulmonary nodule, left lower lobe pulmonary nodule minimal about 4 mm and stable. I discussed with patient today on 12/21/2021, recommended to give her another 3 months chemotherapy holiday and  obtain CT scan afterwards for reevaluation. After discussion, patient is agreeable.   CT scan examination for chest abdomen and pelvis obtained on 3/15/2022 reported a slight increase of the left upper lobe pulmonary nodule 5 mm, from previous 3 mm.  The other pulmonary nodules were stable in size.  There is also small left upper lobe groundglass changes.   I reviewed images studies with the patient today on 3/23/2022, compared to multiple previous images including CT chest CT and PET scan, suspected disease progression.  Discussed with the patient, and I recommended to resume maintenance chemotherapy with 5-FU plus leucovorin plus Avastin repeating every 2 weeks, and obtaining CT scan for reassessment in 3 months.  Patient is agreeable.  Patient resumed maintenance chemotherapy on 3/29/2022 with 5-FU leucovorin and Avastin.   4/26/2022, proceed with cycle #27 maintenance 5-FU, leucovorin, and Avastin.  Patient continues to tolerate treatment well.    On 5/10/2022, we will proceed ahead with cycle #28 maintenance chemotherapy.  Plan to have CT scan after cycle #30.  5/24/2022 cycle 29 maintenance chemotherapy.  Continue to tolerate well.  6/7/2022 cycle #30 maintenance chemotherapy, continuing to tolerate well.   CT scan for chest abdomen pelvis with IV contrast obtained on 6/21/2022 reported sub-6 mm pulmonary nodules either stable or slightly smaller.  We reviewed the images with the patient together.  We discussed with the patient and recommended to hold chemotherapy for now with repeating CT scan in 3 months for reassessment.  CT scan of the chest abdomen pelvis 9/13/2022 reported stable condition, no evidence for recurrent or metastatic colon cancer.  On 1/10/2023 patient had a CT chest abdomen pelvis with reported no evidence for disease progression.  Laboratory study also showed normal CEA.   Patient had a CT scan for chest, abdomen, and pelvis obtained on 04/11/2023. This study reported very small pulmonary  nodules, the right upper lobe nodule is stable to 6 mm, however, the left upper lobe and the left lower lobe nodule increased to 5 mm from previous 4 mm. I discussed with the patient today on 04/19/2023, I recommend to obtain another CT scan in 3 months for reassessment. The nodules are so small, they likely will not be picked up by PET scan examination.  CT scan examination of the chest, abdomen, and pelvis obtained on 7/7/2023 reported stable small pulmonary nodules. No evidence for disease progression or new lesions in chest, abdomen, and pelvis.  Discussed with patient today on 7/14/2023, however, patient is concerning for disease progression. I recommended to obtain Guardant Reveal for circulating tumor DNA study. If the study is positive, we will further obtain PET scan examination, otherwise, we will obtain CT scan for chest, abdomen, and pelvis in 3 months for reassessment.  The patient had CT scan for the chest, abdomen, and pelvis obtained on 10/27/2023, which reported worsening pulmonary nodules at bilateral upper lobes. Laboratory studies showed low normal CEA level and normal liver function panel. The Guardant Reveal study is still pending. I discussed this with the patient on 11/03/2023 and we shared the images, and I recommended having a PET scan examination for further assessment. I will present her case at the multimodality lung conference. If those lesions are deemed to be metastatic lesions, I recommend having stereotactic radiotherapy. I discussed it with the patient, and she voiced understanding and was agreeable with that strategy.  I presented her case at the multimodality chest conference 11/16/2023.  The consensus was for patient to receive stereotactic radiation therapy for the right upper lobe lung lesion, and the bigger left upper lobe lesion, and monitor the smaller left upper lobe lesion with further images studies down the line. Also, the conference recommended Guardant Reveal study  which we already ordered. I discussed with the patient today on 11/17/2023, we explained to the patient that the opinion of the conference and she is agreeable with this and prefers this strategy because of limited toxicity compared to long term commitment to starting chemotherapy again. I recommend to obtain a CT scan for the chest, abdomen, and pelvis with both IV and oral contrast for reassessment in 3 months for reassessment of metastatic lung lesions and thickness in the pelvis where the PET scan reported a low activity SUV 2.4 in the surgical bed.   The patient had steroid-induced radiation therapy for the right upper lobe and one of the left upper lobe lesions.  Her CT scan examination on 02/02/2024 reported shrinking nodules of the right upper lobe and one of the left upper lobe lesions, both from 9 mm down to 6 mm. There are 2 stable pulmonary nodules 7 mm. Nothing new.  02/09/2024, I discussed with the patient and recommended CT scan for the chest, abdomen, and pelvis in 3 months for reassessment. Guardant Reveal study was -0% ctDNA. We will also continue laboratory studies every 3 months for Guardant Reveal, together with routine labs, CBC, CMP, and CEA.  CT scan of the chest, abdomen, and pelvis conducted on 4/23/2024 revealed stable multiple pulmonary nodules, with no other new pulmonary nodules or evidence of disease recurrence or abnormal pelvis. The patient's liver function panel was normal, and low-level CEA level was also noted. The Guardant Reveal study was able to be obtained earlier due to day regulatory rules, and we hugo obtain today the Guardant reveal study, be available within 10 to 14 days.   Given the patient's metastatic liver lesion, and lung lesions from colon cancer, careful monitoring is necessary. A chest CT scan with IV contrast will be arranged in 3 months for reevaluation. The study will be reviewed again in 3 months, which will include CBC, CMP, and CEA level.    2.   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  Low-dose Lovenox discontinued and initiated Xarelto 20 mg daily.  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  Patient held Xarelto for 2 days prior to MediPort removal from the left chest wall and placement of new port in the right chest wall on 12/18/2020.  She resumed Xarelto that evening.  Progressive swelling in the neck, face, upper extremities prompting hospitalization with CT scan showing thrombosis in the right subclavian vein and SVC.  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  Although she had been off of Xarelto for a few days, clearly Xarelto was not prohibiting progression of her thrombosis after she resumed treatment and there was some evidence to suggest thrombosis had been present at least in the innominate vein on prior scan in September but now appears chronic.  Current acute thrombosis involving SVC and right subclavian.  Patient was admitted and placed on heparin drip  Patient was evaluated by vascular surgery and intervention was not recommended for thrombolysis/thrombectomy.  On 12/22/2020, the patient developed worsening shortness of breath, increasing upper extremity edema consistent with worsening SVC syndrome.  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  Symptoms  improved and patient was discharged 12/24/2020 on Lovenox 100 mg every 12 hours.    Returns 12/29/2020 for evaluation continuing Lovenox, overall tolerating it well.  No bleeding issues.  Improved edema 1/5/2021.  Will continue Lovenox weight-based every 12 hours.  On 1/19/2021 and 2/2/2021, patient reports improved facial swelling.  She however does have being bruise on her abdomen wall where she does Lovenox injection.  However she does not have a spontaneous epistaxis, gum bleeding or other bleeding.   On 2/2/2021, we discussed that side effects of Avastin/biosimilar related to thrombosis.  Since this patient already has been on Lovenox, I think the benefits from treatment for her cancer will outweigh that risk of thrombosis, will proceed ahead with Avastin.  I cautioned patient to watch out for signs of worsening thrombosis patient voiced understanding.   On 3/16/2021, no evidence of worsening DVT, continue Lovenox.  5/11/2021 Lovenox dosing will be adjusted due to patient's weight loss.  We discussed she needs 1 mg/kg.  Her syringes are 100 mg syringes she will do 0.9 mL subcu every 12 hours.  8/3/2021 Patient continues to have bruising on abdomen from lovenox injections.  Will need to monitor weight and adjust dosing in future if further weight loss.   Stable condition on 9/28/2021.  Continue Lovenox anticoagulation.    Patient reports no chest pain no dyspnea no leg edema. However she had bruising and also most recently abnormal small abscess for which she actually went to ER on 11/29/2021, had I&D. The wound is healing. No further drainage. Denies fever sweating or chills. After discussion, she will continue Lovenox injection for now.   On 3/23/2022, patient reports good tolerance of Lovenox.  Nevertheless she still has small quantity of pus in the left lower abdomen abscess, she squeezes it every day to prevent it became worse.  She reports no fever sweating chills.  Recommended to start Augmentin 875 mg  twice a day for 7 days.  She will continue Lovenox indefinitely.  On 4/12/2022, patient reports resolution of the small abscess at left lower abdominal wall.   On 5/10/2022, patient continues on Lovenox indefinitely.  No easy bleeding or bruising.  6/23/2022 continuing on Lovenox 1 mg/kg at a dose of 100 mg (patient weight is 96.6 kg today) every 12 hours.  On 1/17/2023, patient reports good tolerance, Lovenox will be continued.    Patient had a venous Doppler study on 02/05/2023, which reported acute left upper extremity DVT in the subclavian vein, axillary and the brachial vein, and also superficial thrombophlebitis in the basilic upper arm.   On 04/19/2023, patient reports that she was not compliant with anticoagulation at the time of recurrent DVT. She now is fully compliant and is using Lovenox.  On 2/09/2024, the patient reports ongoing anticoagulation with no bleeding or bruising.    On 5/3/2024, the patient reports tolerating anticoagulation without any bleeding or bruising, which will be continued.    3.  This patient also has strong family history for malignancy   The patient had appointment with genetic counseling on September 3, 2019.  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    4.  Mild anemia.   She also has history of iron deficiency.  Iron studies reveal iron saturation of 10%.  She was started on oral iron but was unable to tolerate due to GI side effects.   Status post Injectafer 2/5/2020 and 2/12/2020   Improved hemoglobin 13.5 on 1/5/2021.  3/16/2020 when hemoglobin now up to 16.2, hematocrit 47.6.  Patient admits that she has started smoking again, and I have encouraged her to quit.  She denies any snoring or sleep apnea diagnosis.  Patient is not taking oral iron.  We will closely monitor.  3/30/2020 hemoglobin 15.7, HCT 47.5.  Patient states that she has cut back significantly on her smoking, although not quit yet.  I have encouraged her to continue working on this.  We will continue to  closely monitor.  Hemoglobin 14.6 on 6/8/2021 at the meantime he has worsening macrocytosis .6.    Laboratory studies 6/22/2021 reported excellent folate > 20 ng/mL, however low normal B12 level 357 pg/mL.    On 7/6/2021, normal hemoglobin 15.8, .9 and HCT 47.2%.  Patient was asked to start oral vitamin B12 at 1000 mcg daily.   9/14/2021 hemoglobin 17.0, hematocrit 52.1.  Monitor.  On 9/28/2021 hemoglobin 16.1 hematocrit 48.5%, continue to monitor.   Lab study on 12/14/2021 reported excellent ferritin 296 and iron saturation 25% with free iron 104 TIBC 424. Hemoglobin was 15.2 with .2.   Normal hemoglobin 14.6 on 3/15/2022.  Iron study reported normal ferritin 129.5 ng/mL however slightly low iron saturation 11%.  Continue to monitor for now.  4/26/2022, hemoglobin 14.8.  6/7/2022 hemoglobin 15.5.  On 9/20/2022 Hb 15.7, .1.  On 1/10/2023 normal hemoglobin 14.7 MCV 95.0.  Laboratory study on 04/11/2023 reported normal hemoglobin 13.9.  Lab study on 7/7/2023 reported normal hemoglobin 14.9.  A laboratory study on 10/27/2023 reported normal hemoglobin at 15.1.  On 02/09/2024, the patient has normal hemoglobin at 15.0.   The laboratory study conducted today on 4/23/2024 reported a normal hemoglobin level of 14.0.      5.  Peripheral neuropathy secondary to chemotherapy.   This is mild involving bilateral hands and feet as reported on 9/16/2020.   Will avoid chemotherapy with oxaliplatin.  Stable mild neuropathy as of 11/10/2020  Patient reports worsening peripheral neuropathy and cycle #5 chemotherapy on 12/8/2020 with and without irinotecan.   On 1/5/2021, patient reports improvement of peripheral neuropathy, will add irinotecan back to chemotherapy.  Continue to monitor and adjust medication.  On 3/2/2021, patient reports no worsening of peripheral neuropathy after restarting irinotecan.   This remains stable, may be slightly better as reported on 3/23/2022..   No worsening since resuming  chemotherapy on 3/29/2022.  On 6/23/2022 peripheral neuropathy remains stable.  No worsening peripheral neuropathy today.     6.  Depression.  Patient seen by JULIET Davenport on 11/9/2020.  She was started on mirtazapine.  Lexapro was discontinued.  This has definitely improved her mood with the patient feeling overall much better.  She is sleeping better.  Appetite is improved with her actually eating more than she wants to.    Condition is stable.  She plans to continue follow-up with supportive oncology.    7. Malfunction of the portal catheter  Patient reports there was no blood drawn from the portal catheter. CT scan of the chest on 7/7/2023 reported occlusion of the SVC around to the Port-A-Cath tubing. I recommend trying activase to see if it helps.  Otherwise, we may have to remove the catheter. Clinically, patient has no signs of SVC syndrome.  On 11/03/2023, the patient reports that her Port-A-Cath was able to be used for a CT scan for the injection of intravenous dye; however, there was no blood return, so we needed to draw from her arm for the blood test. We will continue to keep Port-A-Cath for now.  On 02/09/2024, the patient reports that the port was able to be flushed. However, no blood was returned. We will continue to flush every 6 weeks.      PLAN:    Obtain guardant reveal study today.    Continue the abdi flush every 6 weeks.  Continue Lovenox anticoagulation twice a day as we prescribed.  Arrange CT scan for the chest, abdomen, and pelvis with intravenous contrast to be done in 3 months.  Laboratory studies, CBC, CMP, CEA, and the Guardant Reveal in 3 months.  The patient will see me in 2 weeks after the CT scan and above laboratory studies for further evaluation.    I spent 37 minutes caring for Carole on this date of service. This time includes time spent by me in the following activities: preparing for the visit, reviewing tests, obtaining and/or reviewing a separately obtained history,  performing a medically appropriate examination and/or evaluation, counseling and educating the patient/family/caregiver, ordering medications, tests, or procedures, referring and communicating with other health care professionals, documenting information in the medical record, independently interpreting results and communicating that information with the patient/family/caregiver and care coordination       Dong Nguyen MD PhD         CC:  WAYNE Worley MD Justin Anderson, MD        Transcribed from ambient dictation for Dong Nguyen MD PhD by Ratna Marie.  05/03/24   18:40 EDT    Patient or patient representative verbalized consent to the visit recording.  I have personally performed the services described in this document as transcribed by the above individual, and it is both accurate and complete.        Dong Nguyen MD PhD

## 2024-05-14 LAB — REF LAB TEST METHOD: NORMAL

## 2024-06-21 ENCOUNTER — TELEPHONE (OUTPATIENT)
Dept: ONCOLOGY | Facility: CLINIC | Age: 44
End: 2024-06-21

## 2024-06-21 NOTE — TELEPHONE ENCOUNTER
Caller: Carole Weaver    Relationship to patient: Self    Best call back number: [phone]    Chief complaint: NEED TO R/S CANCELLED PORT FLUSH     Type of visit: PORT FLUSH     Requested date: 06/28 ANYTIME     If rescheduling, when is the original appointment: 06/14     Additional notes:STATED DOES NOT NEED A CALL BACK IF WILL JUST NOTIFY IN MY CHART

## 2024-06-25 DIAGNOSIS — Z85.048 HISTORY OF RECTAL CANCER: ICD-10-CM

## 2024-06-25 DIAGNOSIS — C20 ADENOCARCINOMA OF RECTUM: Chronic | ICD-10-CM

## 2024-06-25 RX ORDER — DIPHENOXYLATE HYDROCHLORIDE AND ATROPINE SULFATE 2.5; .025 MG/1; MG/1
TABLET ORAL
Qty: 240 TABLET | Refills: 0 | Status: SHIPPED | OUTPATIENT
Start: 2024-06-25

## 2024-06-25 RX ORDER — ONDANSETRON HYDROCHLORIDE 8 MG/1
TABLET, FILM COATED ORAL
Qty: 60 TABLET | Refills: 1 | Status: SHIPPED | OUTPATIENT
Start: 2024-06-25

## 2024-06-26 ENCOUNTER — OFFICE VISIT (OUTPATIENT)
Dept: INTERNAL MEDICINE | Facility: CLINIC | Age: 44
End: 2024-06-26
Payer: COMMERCIAL

## 2024-06-26 VITALS
DIASTOLIC BLOOD PRESSURE: 82 MMHG | HEIGHT: 65 IN | SYSTOLIC BLOOD PRESSURE: 110 MMHG | HEART RATE: 70 BPM | WEIGHT: 204.2 LBS | OXYGEN SATURATION: 98 % | BODY MASS INDEX: 34.02 KG/M2

## 2024-06-26 DIAGNOSIS — K52.9 CHRONIC DIARRHEA: Chronic | ICD-10-CM

## 2024-06-26 DIAGNOSIS — Z79.01 CHRONIC ANTICOAGULATION: Primary | Chronic | ICD-10-CM

## 2024-06-26 DIAGNOSIS — I10 PRIMARY HYPERTENSION: Chronic | ICD-10-CM

## 2024-06-26 DIAGNOSIS — F41.9 ANXIETY: Chronic | ICD-10-CM

## 2024-06-26 DIAGNOSIS — R00.0 TACHYCARDIA: Chronic | ICD-10-CM

## 2024-06-26 DIAGNOSIS — D68.51 HETEROZYGOUS FACTOR V LEIDEN MUTATION: Chronic | ICD-10-CM

## 2024-06-26 PROCEDURE — 99214 OFFICE O/P EST MOD 30 MIN: CPT | Performed by: NURSE PRACTITIONER

## 2024-06-26 RX ORDER — COLESTIPOL HYDROCHLORIDE 5 G/5G
5 GRANULE, FOR SUSPENSION ORAL 2 TIMES DAILY
Qty: 300 G | Refills: 0 | Status: SHIPPED | OUTPATIENT
Start: 2024-06-26

## 2024-06-26 RX ORDER — NITROFURANTOIN 25; 75 MG/1; MG/1
100 CAPSULE ORAL 2 TIMES DAILY
Qty: 14 CAPSULE | Refills: 0 | Status: SHIPPED | OUTPATIENT
Start: 2024-06-26 | End: 2024-07-03

## 2024-06-26 NOTE — PROGRESS NOTES
Chief Complaint  Hypertension     Subjective:      History of Present Illness {CC  Problem List  Visit  Diagnosis   Encounters  Notes  Medications  Labs  Result Review Imaging  Media :23}     Carole Weaver presents to [facility] for:        Adenocarcinoma of rectum:    She had follow up with oncology: 5/3/24  Given the patient's metastatic liver lesion, and lung lesions from colon cancer, careful monitoring is necessary. A chest CT scan with IV contrast will be arranged in 3 months for reevaluation. The study will be reviewed again in 3 months, which will include CBC, CMP, and CEA level.     Continues BB for tachycardia. Rate controlled.     ENT: cleared left ear and hearing improved.     Now glasses for reading.       Diarrhea: has been improving mush to formed mush  Joyride: plant based candy: 9 g fiber per serving and pre-biotics.   She had had several servings.   Continues lomotil, imodium     Anxiety: continues klonopin sparingly.   She has been taking one nightly and then during the day once as needed.   Continues lexaro       I have reviewed patient's medical history, any new submitted information provided by patient or medical assistant and updated medical record.      Objective:      Physical Exam  Vitals reviewed.   Constitutional:       Appearance: Normal appearance. She is well-developed.   Neck:      Thyroid: No thyromegaly.   Cardiovascular:      Rate and Rhythm: Normal rate and regular rhythm.      Pulses: Normal pulses.      Heart sounds: Normal heart sounds.   Pulmonary:      Effort: Pulmonary effort is normal.      Breath sounds: Normal breath sounds.      Comments: E/U   Abdominal:      General: Bowel sounds are normal.   Neurological:      Mental Status: She is alert and oriented to person, place, and time.   Psychiatric:         Mood and Affect: Mood normal.         Behavior: Behavior normal. Behavior is cooperative.         Thought Content:  "Thought content normal.         Judgment: Judgment normal.        Result Review  Data Reviewed:{ Labs  Result Review  Imaging  Med Tab  Media :23}     The following data was reviewed by: Deepika Vela III, MICHAEL-C on 06/26/2024  Common labs          10/27/2023    10:28 2/9/2024    10:40 4/23/2024    10:17   Common Labs   Glucose 98  100  98    BUN 9  5  9    Creatinine 0.75  0.87  0.83    Sodium 140  140  138    Potassium 4.5  4.9  3.9    Chloride 104  103  101    Calcium 9.9  9.3  9.6    Albumin 3.9  3.7  3.9    Total Bilirubin 0.3  0.3  <0.2    Alkaline Phosphatase 102  96  94    AST (SGOT) 13  21  10    ALT (SGPT) 12  16  16    WBC 5.82  4.45  5.82    Hemoglobin 15.1  15.0  14.0    Hematocrit 46.6  45.5  42.1    Platelets 231  217  223             Vital Signs:   /82 (BP Location: Left arm, Patient Position: Sitting, Cuff Size: Adult)   Pulse 70   Ht 165.1 cm (65\")   Wt 92.6 kg (204 lb 3.2 oz)   SpO2 98%   BMI 33.98 kg/m²   Estimated body mass index is 33.98 kg/m² as calculated from the following:    Height as of this encounter: 165.1 cm (65\").    Weight as of this encounter: 92.6 kg (204 lb 3.2 oz).        Requested Prescriptions     Signed Prescriptions Disp Refills    colestipol (Colestid) 5 g granules 300 g 0     Sig: Take 5 g by mouth 2 (Two) Times a Day.    nitrofurantoin, macrocrystal-monohydrate, (Macrobid) 100 MG capsule 14 capsule 0     Sig: Take 1 capsule by mouth 2 (Two) Times a Day for 7 days.       Routine medications provided by this office will also be refilled via pharmacy request.       Current Outpatient Medications:     clonazePAM (KlonoPIN) 1 MG tablet, Take 1 tablet as needed daily for anxiety. May take one additional as needed for severe anxiety., Disp: 45 tablet, Rfl: 4    Cyanocobalamin (VITAMIN B-12 PO), Take 1 tablet by mouth Every Other Day., Disp: , Rfl:     dicyclomine (BENTYL) 20 MG tablet, TAKE 1 TABLET BY MOUTH EVERY 6 (SIX) HOURS AS NEEDED FOR IRRITABLE " BOWEL SYMPTOMS, Disp: 360 tablet, Rfl: 2    diphenoxylate-atropine (LOMOTIL) 2.5-0.025 MG per tablet, TAKE 2 TABLETS BY MOUTH 4 TIMES A DAY, Disp: 240 tablet, Rfl: 0    Enoxaparin Sodium (LOVENOX) 100 MG/ML solution prefilled syringe syringe, Inject 1 mL under the skin into the appropriate area as directed Every 12 (Twelve) Hours., Disp: 60 mL, Rfl: 2    escitalopram (LEXAPRO) 10 MG tablet, TAKE 1 TABLET BY MOUTH EVERY DAY, Disp: 90 tablet, Rfl: 1    famotidine (PEPCID) 20 MG tablet, TAKE 1 TABLET BY MOUTH TWICE A DAY, Disp: 180 tablet, Rfl: 2    Imvexxy Maintenance Pack 10 MCG insert, Insert 10 mcg into the vagina 2 (Two) Times a Week., Disp: , Rfl:     Loperamide HCl (IMODIUM PO), Take  by mouth As Needed., Disp: , Rfl:     Loratadine 10 MG capsule, Take 1 capsule by mouth Every Evening., Disp: , Rfl:     metoprolol succinate XL (TOPROL-XL) 25 MG 24 hr tablet, TAKE 0.5 TABLETS BY MOUTH EVERY NIGHT, Disp: 45 tablet, Rfl: 2    ondansetron (ZOFRAN) 8 MG tablet, TAKE 1 TABLET BY MOUTH THREE TIMES A DAY AS NEEDED FOR NAUSEA AND VOMITING, Disp: 60 tablet, Rfl: 1    colestipol (Colestid) 5 g granules, Take 5 g by mouth 2 (Two) Times a Day., Disp: 300 g, Rfl: 0    nitrofurantoin, macrocrystal-monohydrate, (Macrobid) 100 MG capsule, Take 1 capsule by mouth 2 (Two) Times a Day for 7 days., Disp: 14 capsule, Rfl: 0     Assessment and Plan:      Assessment and Plan {CC Problem List  Visit Diagnosis  ROS  Review (Popup)  Health Maintenance  Quality  BestPractice  Medications  SmartSets  SnapShot Encounters  Media :23}     Diagnoses and all orders for this visit:    1. Chronic anticoagulation (Primary)    2. Primary hypertension  Overview:  Lisinopril 10 mg too much. (weakness, dizziness)     Continue to monitor - if need to restart, advised lower dose.        3. Chronic diarrhea  Assessment & Plan:  Chronic - ? Worsening 2/2/ plant based snack with pre-biotics.      Will trial her on colestid   I discussed  medication use, dosing and possible side effects.   Patient was given opportunity to ask any questions.      She will notify me if worsens or doesn't improve.     Orders:  -     colestipol (Colestid) 5 g granules; Take 5 g by mouth 2 (Two) Times a Day.  Dispense: 300 g; Refill: 0    4. Tachycardia  Assessment & Plan:  Improved on toprol   Continue       5. Heterozygous factor V Leiden mutation    6. Anxiety  Assessment & Plan:  Klonopin: effective   She will try to decrease use at night - try to only taking during the day as needed for worsening anxiety.     Last filled 4/22/24      Other orders  -     nitrofurantoin, macrocrystal-monohydrate, (Macrobid) 100 MG capsule; Take 1 capsule by mouth 2 (Two) Times a Day for 7 days.  Dispense: 14 capsule; Refill: 0             New Medications Ordered This Visit   Medications    colestipol (Colestid) 5 g granules     Sig: Take 5 g by mouth 2 (Two) Times a Day.     Dispense:  300 g     Refill:  0    nitrofurantoin, macrocrystal-monohydrate, (Macrobid) 100 MG capsule     Sig: Take 1 capsule by mouth 2 (Two) Times a Day for 7 days.     Dispense:  14 capsule     Refill:  0           Follow Up {Instructions Charge Capture  Follow-up Communications :23}     Return in about 3 months (around 9/26/2024).      Patient was given instructions and counseling regarding her condition or for health maintenance advice. Please see specific information pulled into the AVS if appropriate.    Dragon disclaimer:   Much of this encounter note is an electronic transcription/translation of spoken language to printed text. The electronic translation of spoken language may permit erroneous, or at times, nonsensical words or phrases to be inadvertently transcribed; Although I have reviewed the note for such errors, some may still exist.     Additional Patient Counseling:       There are no Patient Instructions on file for this visit.

## 2024-06-27 NOTE — ASSESSMENT & PLAN NOTE
Klonopin: effective   She will try to decrease use at night - try to only taking during the day as needed for worsening anxiety.     Last filled 4/22/24

## 2024-06-27 NOTE — ASSESSMENT & PLAN NOTE
Chronic - ? Worsening 2/2/ plant based snack with pre-biotics.      Will trial her on colestid   I discussed medication use, dosing and possible side effects.   Patient was given opportunity to ask any questions.      She will notify me if worsens or doesn't improve.

## 2024-06-28 ENCOUNTER — INFUSION (OUTPATIENT)
Dept: ONCOLOGY | Facility: HOSPITAL | Age: 44
End: 2024-06-28
Payer: COMMERCIAL

## 2024-06-28 DIAGNOSIS — Z45.2 ENCOUNTER FOR FITTING AND ADJUSTMENT OF VASCULAR CATHETER: Primary | ICD-10-CM

## 2024-06-28 PROCEDURE — 25010000002 HEPARIN LOCK FLUSH PER 10 UNITS: Performed by: INTERNAL MEDICINE

## 2024-06-28 RX ORDER — SODIUM CHLORIDE 0.9 % (FLUSH) 0.9 %
10 SYRINGE (ML) INJECTION AS NEEDED
OUTPATIENT
Start: 2024-06-28

## 2024-06-28 RX ORDER — HEPARIN SODIUM (PORCINE) LOCK FLUSH IV SOLN 100 UNIT/ML 100 UNIT/ML
500 SOLUTION INTRAVENOUS AS NEEDED
Status: DISCONTINUED | OUTPATIENT
Start: 2024-06-28 | End: 2024-06-28 | Stop reason: HOSPADM

## 2024-06-28 RX ORDER — HEPARIN SODIUM (PORCINE) LOCK FLUSH IV SOLN 100 UNIT/ML 100 UNIT/ML
500 SOLUTION INTRAVENOUS AS NEEDED
OUTPATIENT
Start: 2024-06-28

## 2024-06-28 RX ORDER — SODIUM CHLORIDE 0.9 % (FLUSH) 0.9 %
10 SYRINGE (ML) INJECTION AS NEEDED
Status: DISCONTINUED | OUTPATIENT
Start: 2024-06-28 | End: 2024-06-28 | Stop reason: HOSPADM

## 2024-06-28 RX ADMIN — Medication 500 UNITS: at 14:00

## 2024-06-28 RX ADMIN — Medication 10 ML: at 14:00

## 2024-07-08 ENCOUNTER — OFFICE VISIT (OUTPATIENT)
Dept: CARDIOLOGY | Facility: CLINIC | Age: 44
End: 2024-07-08
Payer: COMMERCIAL

## 2024-07-08 VITALS
SYSTOLIC BLOOD PRESSURE: 144 MMHG | DIASTOLIC BLOOD PRESSURE: 78 MMHG | HEIGHT: 65 IN | HEART RATE: 79 BPM | WEIGHT: 204 LBS | BODY MASS INDEX: 33.99 KG/M2

## 2024-07-08 DIAGNOSIS — I25.10 CORONARY ARTERY CALCIFICATION: Primary | ICD-10-CM

## 2024-07-08 DIAGNOSIS — I49.9 IRREGULAR HEART BEAT: ICD-10-CM

## 2024-07-08 DIAGNOSIS — I25.84 CORONARY ARTERY CALCIFICATION: Primary | ICD-10-CM

## 2024-07-08 DIAGNOSIS — D68.51 HETEROZYGOUS FACTOR V LEIDEN MUTATION: Chronic | ICD-10-CM

## 2024-07-08 PROBLEM — Z72.0 TOBACCO ABUSE: Status: ACTIVE | Noted: 2024-07-08

## 2024-07-08 PROCEDURE — 93000 ELECTROCARDIOGRAM COMPLETE: CPT | Performed by: NURSE PRACTITIONER

## 2024-07-08 PROCEDURE — 99214 OFFICE O/P EST MOD 30 MIN: CPT | Performed by: NURSE PRACTITIONER

## 2024-07-08 RX ORDER — ROSUVASTATIN CALCIUM 5 MG/1
5 TABLET, COATED ORAL DAILY
Qty: 90 TABLET | Refills: 0 | Status: SHIPPED | OUTPATIENT
Start: 2024-07-08

## 2024-07-08 NOTE — PROGRESS NOTES
Date of Office Visit: 2024  Encounter Provider: JULIET Asencio  Place of Service: [facility] CARDIOLOGY  Patient Name: Carole Weaver  :1980    Chief Complaint   Patient presents with    Palpitations   :     HPI: Carole Weaver is a 43 y.o. female patient with a metastatic rectal cancer (status post low anterior colon resection with coloanal anastomosis). She follows with Dr. Bustos for cardio oncology management.  Her last maintenance chemotherapy was in .    Additionally, she has has a factor V Leiden mutation and a history of multiple thromboses including a SVC thrombosis (which occurred while on Xarelto) as well as a PE.  She is now on weight-based Lovenox.      Echocardiogram from 2023 demonstrated normal LV function and no significant valvular abnormalities.    She was last seen in the office by JULIET Hand in May 2023 at time she was doing okay.  Recent imaging at with the [facility] demonstrated 3 lung nodules that had increased slightly in size, too small for resection.  No changes were made to her regimen, and she was advised to follow-up in 1 year.    Overall, she is feeling well.  She denies any chest pain, shortness of breath, palpitations, edema, dizziness, or syncope.  She continues smoking 1 pack/day.  In December, she began targeted radiation therapy.    Past Medical History:   Diagnosis Date    Abdominopelvic abscess 2020    ADMITTED TO [facility]    Abnormal Pap smear of cervix 1998    JULIUS I    Abscess of abdominal wall 2012    SEEN AT [facility] ER    Anemia in pregnancy     Anxiety     Bilateral epiphora 2020    Bilateral hand swelling 2020    SEEN AT [facility] ER    Cervical lymphadenitis 2012    SEEN AT [facility] ER    Chemotherapy induced diarrhea 2021    Chemotherapy induced neutropenia 2020    Chemotherapy-induced nausea 2020    Chemotherapy-induced thrombocytopenia 2020    Chronic  anticoagulation     Chronic diarrhea     Colon polyps     FOLLOWED BY DR. 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 ALEXANDRU HUNT    History of gestational diabetes     History of pre-eclampsia 1998    History of pre-eclampsia     History of radiation therapy 2019    FOLLOWED BY DR. JAVON LEWIS    History of TB skin testing 01/17/2009    TB Skin Test    History of TB skin testing 01/17/2009    TB Skin Test    History of transfusion 2019    12/2019    HPV in female 1998    Hypokalemia 09/2019    Hypomagnesemia 09/2019    Hyponatremia 06/2021    IBS (irritable bowel syndrome)     Ileostomy present 04/25/2020    Take down on 11/3/2022    Infected insect bite of neck 05/27/2016    SEEN AT [facility]    Kidney stones 08/09/2007    SEEN AT [facility] ER    Low anterior resection syndrome 12/2022    FOLLOWED BY DR. 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    Perirectal abscess 08/12/2020    Pulmonary embolism without acute cor pulmonale 09/20/2019    X 3; ADMITTED TO [facility]    Pulmonary nodule, right 05/2020    Rectal cancer 07/08/2019    STAGE IIA, INVASIVE MODERATELY DIFFERENTIATED ADENOCARCINOMA, CHEMO AND XRT  FINISHED 2019    Right shoulder pain 2020    SEEN AT [facility] ER    Right ureteral stone 10/01/2019    SEEN AT [facility] ER    SAB (spontaneous ) 1996     A2-1 INDUCED    Sciatica     Sepsis due to cellulitis 2002    LEFT GREAT TOE, ADMITTED TO [facility]    Tachycardia 2020    Thrombosis of superior vena cava 2020    AND BRACHIOCEPHALIC VEIN, ADMITTED TO [facility]    Urinary urgency 2020       Past Surgical History:   Procedure Laterality Date    ABDOMINAL SURGERY      CHOLECYSTECTOMY N/A 10/10/2003    LAPAROSCOPIC WITH CHOLANGIOGRAM, DR. JAMEY TALAVERA AT [facility]    COLON RESECTION N/A 2019    Procedure: laparoscopic low anterior colon resection with mobilization of splenic flexure and diverting loop ileostomy: ERAS;  Surgeon: Padmaja Esparza MD;  Location: [facility] OR;  Service: General    COLON RESECTION N/A 2020    Procedure: LOW ANTERIOR COLON RESECTION, COLOANAL ANASTOMOSIS, MOBILIZATION SPLENIC FLEXURE;  Surgeon: Padmaja Esparza MD;  Location: [facility] MAIN OR;  Service: General;  Laterality: N/A;    COLONOSCOPY N/A 07/15/2020    PATENT ANASTAMOSIS IN MID RECTUM, RESCOPE IN 1 YR, DR. PADMAJA ESPARZA AT [facility]    COLONOSCOPY N/A 2022    DIFFUSE AREA OF MODERATELY FRIABLE MUCOSA IN ENTIRE COLON , DIVERSION COLITIS, PATENT AND HEALTHY ANASTAMOSIS, DR. PADMAJA ESPARZA AT [facility]    COLONOSCOPY N/A 2023    6 MM SESSILE SERRATED ADENOMA POLYP IN DESCENDING, PATENT ANASTAMOSIS IN DISTAL RECTUM, RESCOPE IN 2 YRS, DR. PADMAJA ESPARZA AT [facility]    COLONOSCOPY W/ POLYPECTOMY N/A 2019    15 MM TUBULOVILLOUS ADENOMA POLYP IN HEPATIC FLEXURE, 20 MMTUBULOVILLOUS ADENOMA WITH HIGH GRADE DYSPLASIA IN RECTOSIGMOID, 4 CM MASS IN MID RECTUM, PATH: INVASIVE MODERATELY DIFFERENTIATED ADENOCARCINOMA, DR. JENNIFER LI AT [facility]    DILATATION AND EVACUATION N/A     ENDOSCOPY N/A 2019    LA GRADE A ESOPHAGITIS, GASTRITIS, ALL BIOPSIES BENIGN, DR. JENNIFER LI AT [facility]    ILEOSTOMY CLOSURE N/A 11/14/2022    Procedure: ILEOSTOMY TAKEDOWN;  Surgeon: Geronimo Esparza MD;  Location: [facility] MAIN OR;  Service: General;  Laterality: N/A;    INCISION AND DRAINAGE PERIRECTAL ABSCESS N/A 08/14/2020    Procedure: INCISION AND DRAINAGE OF retrorectal dehiscence abcess with drain placement and irrigation;  Surgeon: Geronimo Esparza MD;  Location: [facility] MAIN OR;  Service: General;  Laterality: N/A;    PAP SMEAR N/A 01/24/2014    SIGMOIDOSCOPY N/A 07/24/2019    INFILTRATIVE PARTIALLY OBSTRUCTING LARGE RECTAL CANCER, AREA TATOOED, DR. GERONIMO ESPARZA AT [facility]    SIGMOIDOSCOPY N/A 11/23/2019    AN ULCERATED PARTIALLY OBSTRUCTING MASS IN MID RECTUM, AREA TATTOOED, DR. GERONIMO ESPARZA AT [facility]    SIGMOIDOSCOPY N/A 07/22/2021    PATENT ANASTAMOSIS IN DISTAL RECTUM, RESCOPE IN 1 YR, DR. GERONIMO ESPARZA AT [facility]    TRANSRECTAL ULTRASOUND N/A 07/24/2019    Procedure: ULTRASOUND TRANSRECTAL;  Surgeon: Geronimo Esparza MD;  Location: [facility] ENDOSCOPY;  Service: General    URETEROSCOPY LASER LITHOTRIPSY WITH STENT INSERTION Right 10/30/2019    DR. ESTUARDO BEASLEY AT [place]    VAGINAL DELIVERY N/A 09/18/1998    VENOUS ACCESS DEVICE (PORT) INSERTION Left 08/09/2019    Procedure: INSERTION VENOUS ACCESS DEVICE;  Surgeon: Sj Joseph MD;  Location: [facility] OR [facility];  Service: General    VENOUS ACCESS DEVICE (PORT) INSERTION N/A 12/18/2020    Procedure: INSERTION VENOUS ACCESS DEVICE right side, removal venous access device left side;  Surgeon: Sj Joseph MD;  Location:  [facility];  Service: General;  Laterality: N/A;    WISDOM TOOTH EXTRACTION Bilateral 1993       Social History     Socioeconomic History    Marital status:      Spouse name: Justus    Number of children: 1    Years of education: College   Tobacco Use    Smoking status: Every Day     Current packs/day: 1.00     Average packs/day: 1 pack/day for 25.0 years (25.0 ttl pk-yrs)     Types: Electronic Cigarette, Cigarettes      Passive exposure: Current    Smokeless tobacco: Never    Tobacco comments:     1 PPD/caffeine use    Vaping Use    Vaping status: Some Days    Substances: Nicotine    Devices: Disposable    Passive vaping exposure: Yes   Substance and Sexual Activity    Alcohol use: Not Currently     Comment: RARELY    Drug use: Never    Sexual activity: Yes     Partners: Male     Comment: .       Family History   Problem Relation Age of Onset    Hyperlipidemia Mother     Colon polyps Mother     Heart disease Mother     Hypertension Mother     Factor V Leiden deficiency Mother     Skin cancer Father         Squamous in 60s    Atrial fibrillation Father     Drug abuse Sister     Mental illness Sister         Addiction    No Known Problems Son     Colon cancer Maternal Uncle     Cancer Paternal Uncle     Colon cancer Paternal Uncle     Diabetes Paternal Uncle     Heart disease Paternal Grandfather     Cancer Paternal Aunt         OVARIAN    Malig Hyperthermia Neg Hx        Review of Systems   Constitutional: Negative.   Cardiovascular: Negative.  Negative for chest pain, dyspnea on exertion, leg swelling, orthopnea, paroxysmal nocturnal dyspnea and syncope.   Respiratory: Negative.     Hematologic/Lymphatic: Negative for bleeding problem.   Musculoskeletal:  Negative for falls.   Gastrointestinal:  Negative for melena.   Neurological:  Negative for dizziness and light-headedness.       No Known Allergies      Current Outpatient Medications:     clonazePAM (KlonoPIN) 1 MG tablet, Take 1 tablet as needed daily for anxiety. May take one additional as needed for severe anxiety., Disp: 45 tablet, Rfl: 4    colestipol (Colestid) 5 g granules, Take 5 g by mouth 2 (Two) Times a Day., Disp: 300 g, Rfl: 0    Cyanocobalamin (VITAMIN B-12 PO), Take 1 tablet by mouth Every Other Day., Disp: , Rfl:     dicyclomine (BENTYL) 20 MG tablet, TAKE 1 TABLET BY MOUTH EVERY 6 (SIX) HOURS AS NEEDED FOR IRRITABLE BOWEL SYMPTOMS, Disp: 360 tablet, Rfl:  "2    diphenoxylate-atropine (LOMOTIL) 2.5-0.025 MG per tablet, TAKE 2 TABLETS BY MOUTH 4 TIMES A DAY, Disp: 240 tablet, Rfl: 0    Enoxaparin Sodium (LOVENOX) 100 MG/ML solution prefilled syringe syringe, Inject 1 mL under the skin into the appropriate area as directed Every 12 (Twelve) Hours., Disp: 60 mL, Rfl: 2    escitalopram (LEXAPRO) 10 MG tablet, TAKE 1 TABLET BY MOUTH EVERY DAY, Disp: 90 tablet, Rfl: 1    famotidine (PEPCID) 20 MG tablet, TAKE 1 TABLET BY MOUTH TWICE A DAY, Disp: 180 tablet, Rfl: 2    Imvexxy Maintenance Pack 10 MCG insert, Insert 10 mcg into the vagina 2 (Two) Times a Week., Disp: , Rfl:     Loperamide HCl (IMODIUM PO), Take  by mouth As Needed., Disp: , Rfl:     Loratadine 10 MG capsule, Take 1 capsule by mouth Every Evening., Disp: , Rfl:     metoprolol succinate XL (TOPROL-XL) 25 MG 24 hr tablet, TAKE 0.5 TABLETS BY MOUTH EVERY NIGHT, Disp: 45 tablet, Rfl: 2    ondansetron (ZOFRAN) 8 MG tablet, TAKE 1 TABLET BY MOUTH THREE TIMES A DAY AS NEEDED FOR NAUSEA AND VOMITING, Disp: 60 tablet, Rfl: 1    rosuvastatin (CRESTOR) 5 MG tablet, Take 1 tablet by mouth Daily., Disp: 90 tablet, Rfl: 0      Objective:     Vitals:    07/08/24 1111   BP: 144/78   Pulse: 79   Weight: 92.5 kg (204 lb)   Height: 165.1 cm (65\")     Body mass index is 33.95 kg/m².    PHYSICAL EXAM:    Physical Exam      ECG 12 Lead    Date/Time: 7/8/2024 11:20 AM  Performed by: Dawn Benton APRN    Authorized by: Dawn Benton APRN  Comparison: compared with previous ECG from 5/10/2023  Similar to previous ECG  Rhythm: sinus rhythm  Rate: normal  BPM: 79            Assessment:       Diagnosis Plan   1. Coronary artery calcification        2. Irregular heart beat  ECG 12 Lead      3. Heterozygous factor V Leiden mutation          Orders Placed This Encounter   Procedures    ECG 12 Lead     This order was created via procedure documentation     Order Specific Question:   Release to patient     Answer:   Routine " Release [[id number]]          Plan:       1.  Coronary artery calcification.  She denies any symptoms of angina.  Per Dr. Bustos's recommendation, will initiate rosuvastatin 5 mg.      2.  Irregular heartbeat.  She has a history of chemo induced palpitations for which she is managed with metoprolol.      3.  Factor V Leiden mutation.  On weight-based Lovenox.      I think she is doing well.  She will follow-up with Dr. Bustos in 1 year.      As always, it has been a pleasure to participate in your patient's care.      Sincerely,         JULIET Maher

## 2024-07-09 ENCOUNTER — TELEPHONE (OUTPATIENT)
Dept: CARDIOLOGY | Facility: CLINIC | Age: 44
End: 2024-07-09
Payer: COMMERCIAL

## 2024-07-09 DIAGNOSIS — C20 ADENOCARCINOMA OF RECTUM: Primary | ICD-10-CM

## 2024-07-09 NOTE — TELEPHONE ENCOUNTER
----- Message from Tasha Bustos sent at 7/9/2024  7:43 AM EDT -----  Limited for lvef and gls    thx  ----- Message -----  From: Dawn Benton APRN  Sent: 7/8/2024  12:15 PM EDT  To: Tasha Bustos MD    She has followed with you for cardio oncology management.  I saw her today and she is doing well.  She just wanted to clarify whether or not she needed any repeat echocardiograms?  She has been off chemotherapy for 2 years.  
I spoke with pt and gave her message from provider.  She's agreeable to limited echo.  Scheduling, please call and set up.    Thank you,    Rose Mary LEVI RN  Triage [facility]  07/09/24 16:12 EDT    
Please let her know I talked with Dr. Bustos.  She has recommended a limited echocardiogram which I have ordered.    Nabil, please schedule limited echocardiogram.  
Opt out

## 2024-07-17 ENCOUNTER — TELEPHONE (OUTPATIENT)
Dept: CARDIOLOGY | Facility: CLINIC | Age: 44
End: 2024-07-17

## 2024-07-17 NOTE — TELEPHONE ENCOUNTER
Hub staff attempted to follow warm transfer process and was unsuccessful     Caller: Carole Weaver    Relationship to patient: Self    Best call back number: [phone] (home)      Patient is needing: PT RETURNING A CALL TO SCHEDULE AN ECHO. PLEASE TRY REACHING PT BACK ASAP.

## 2024-07-26 DIAGNOSIS — Z85.048 HISTORY OF RECTAL CANCER: ICD-10-CM

## 2024-07-26 RX ORDER — DIPHENOXYLATE HYDROCHLORIDE AND ATROPINE SULFATE 2.5; .025 MG/1; MG/1
TABLET ORAL
Qty: 240 TABLET | Refills: 0 | Status: SHIPPED | OUTPATIENT
Start: 2024-07-26

## 2024-07-31 DIAGNOSIS — F33.9 MONOPOLAR DEPRESSION: ICD-10-CM

## 2024-07-31 RX ORDER — ESCITALOPRAM OXALATE 10 MG/1
10 TABLET ORAL DAILY
Qty: 90 TABLET | Refills: 1 | Status: SHIPPED | OUTPATIENT
Start: 2024-07-31

## 2024-07-31 NOTE — TELEPHONE ENCOUNTER
Rx Refill Note  Requested Prescriptions     Pending Prescriptions Disp Refills    escitalopram (LEXAPRO) 10 MG tablet 90 tablet 0     Sig: Take 1 tablet by mouth Daily.      Last office visit with prescribing clinician: 6/26/2024  Next office visit with prescribing clinician: 9/27/2024         Erika Thakur MA  07/31/24, 08:39 EDT

## 2024-07-31 NOTE — TELEPHONE ENCOUNTER
Rx Refill Note  Requested Prescriptions     Pending Prescriptions Disp Refills    escitalopram (LEXAPRO) 10 MG tablet 90 tablet 1     Sig: Take 1 tablet by mouth Daily.      Last office visit with prescribing clinician: 6/26/2024   Last telemedicine visit with prescribing clinician: Visit date not found   Next office visit with prescribing clinician: 9/27/2024

## 2024-08-02 ENCOUNTER — HOSPITAL ENCOUNTER (OUTPATIENT)
Dept: CT IMAGING | Facility: HOSPITAL | Age: 44
Discharge: HOME | End: 2024-08-02
Admitting: INTERNAL MEDICINE
Payer: COMMERCIAL

## 2024-08-02 ENCOUNTER — INFUSION (OUTPATIENT)
Dept: ONCOLOGY | Facility: HOSPITAL | Age: 44
End: 2024-08-02
Payer: COMMERCIAL

## 2024-08-02 DIAGNOSIS — C78.02 MALIGNANT NEOPLASM METASTATIC TO BOTH LUNGS: ICD-10-CM

## 2024-08-02 DIAGNOSIS — R91.8 MULTIPLE PULMONARY NODULES: ICD-10-CM

## 2024-08-02 DIAGNOSIS — C78.01 MALIGNANT NEOPLASM METASTATIC TO BOTH LUNGS: ICD-10-CM

## 2024-08-02 DIAGNOSIS — C20 ADENOCARCINOMA OF RECTUM: ICD-10-CM

## 2024-08-02 DIAGNOSIS — Z45.2 ENCOUNTER FOR FITTING AND ADJUSTMENT OF VASCULAR CATHETER: Primary | ICD-10-CM

## 2024-08-02 LAB
ALBUMIN SERPL-MCNC: 3.7 G/DL (ref 3.5–5.2)
ALBUMIN/GLOB SERPL: 1.3 G/DL
ALP SERPL-CCNC: 85 U/L (ref 39–117)
ALT SERPL W P-5'-P-CCNC: 14 U/L (ref 1–33)
ANION GAP SERPL CALCULATED.3IONS-SCNC: 9.3 MMOL/L (ref 5–15)
AST SERPL-CCNC: 18 U/L (ref 1–32)
BASOPHILS # BLD AUTO: 0.01 10*3/MM3 (ref 0–0.2)
BASOPHILS NFR BLD AUTO: 0.2 % (ref 0–1.5)
BILIRUB SERPL-MCNC: 0.3 MG/DL (ref 0–1.2)
BUN SERPL-MCNC: 9 MG/DL (ref 6–20)
BUN/CREAT SERPL: 12.5 (ref 7–25)
CALCIUM SPEC-SCNC: 8.8 MG/DL (ref 8.6–10.5)
CEA SERPL-MCNC: 1.15 NG/ML
CHLORIDE SERPL-SCNC: 104 MMOL/L (ref 98–107)
CO2 SERPL-SCNC: 25.7 MMOL/L (ref 22–29)
CREAT SERPL-MCNC: 0.72 MG/DL (ref 0.57–1)
DEPRECATED RDW RBC AUTO: 45.2 FL (ref 37–54)
EGFRCR SERPLBLD CKD-EPI 2021: 105.2 ML/MIN/1.73
EOSINOPHIL # BLD AUTO: 0.15 10*3/MM3 (ref 0–0.4)
EOSINOPHIL NFR BLD AUTO: 2.8 % (ref 0.3–6.2)
ERYTHROCYTE [DISTWIDTH] IN BLOOD BY AUTOMATED COUNT: 13.2 % (ref 12.3–15.4)
GLOBULIN UR ELPH-MCNC: 2.8 GM/DL
GLUCOSE SERPL-MCNC: 93 MG/DL (ref 65–99)
HCT VFR BLD AUTO: 42.5 % (ref 34–46.6)
HGB BLD-MCNC: 13.8 G/DL (ref 12–15.9)
IMM GRANULOCYTES # BLD AUTO: 0.01 10*3/MM3 (ref 0–0.05)
IMM GRANULOCYTES NFR BLD AUTO: 0.2 % (ref 0–0.5)
LYMPHOCYTES # BLD AUTO: 0.99 10*3/MM3 (ref 0.7–3.1)
LYMPHOCYTES NFR BLD AUTO: 18.3 % (ref 19.6–45.3)
MCH RBC QN AUTO: 30.4 PG (ref 26.6–33)
MCHC RBC AUTO-ENTMCNC: 32.5 G/DL (ref 31.5–35.7)
MCV RBC AUTO: 93.6 FL (ref 79–97)
MONOCYTES # BLD AUTO: 0.43 10*3/MM3 (ref 0.1–0.9)
MONOCYTES NFR BLD AUTO: 8 % (ref 5–12)
NEUTROPHILS NFR BLD AUTO: 3.81 10*3/MM3 (ref 1.7–7)
NEUTROPHILS NFR BLD AUTO: 70.5 % (ref 42.7–76)
NRBC BLD AUTO-RTO: 0 /100 WBC (ref 0–0.2)
PLATELET # BLD AUTO: 180 10*3/MM3 (ref 140–450)
PMV BLD AUTO: 9.2 FL (ref 6–12)
POTASSIUM SERPL-SCNC: 3.9 MMOL/L (ref 3.5–5.2)
PROT SERPL-MCNC: 6.5 G/DL (ref 6–8.5)
RBC # BLD AUTO: 4.54 10*6/MM3 (ref 3.77–5.28)
SODIUM SERPL-SCNC: 139 MMOL/L (ref 136–145)
WBC NRBC COR # BLD AUTO: 5.4 10*3/MM3 (ref 3.4–10.8)

## 2024-08-02 PROCEDURE — 96523 IRRIG DRUG DELIVERY DEVICE: CPT

## 2024-08-02 PROCEDURE — 25510000001 IOPAMIDOL 61 % SOLUTION: Performed by: INTERNAL MEDICINE

## 2024-08-02 PROCEDURE — 80053 COMPREHEN METABOLIC PANEL: CPT

## 2024-08-02 PROCEDURE — 71260 CT THORAX DX C+: CPT

## 2024-08-02 PROCEDURE — 82378 CARCINOEMBRYONIC ANTIGEN: CPT | Performed by: INTERNAL MEDICINE

## 2024-08-02 PROCEDURE — G0463 HOSPITAL OUTPT CLINIC VISIT: HCPCS

## 2024-08-02 PROCEDURE — 25010000002 HEPARIN LOCK FLUSH PER 10 UNITS: Performed by: INTERNAL MEDICINE

## 2024-08-02 PROCEDURE — 85025 COMPLETE CBC W/AUTO DIFF WBC: CPT

## 2024-08-02 RX ORDER — SODIUM CHLORIDE 0.9 % (FLUSH) 0.9 %
10 SYRINGE (ML) INJECTION AS NEEDED
OUTPATIENT
Start: 2024-08-02

## 2024-08-02 RX ORDER — SODIUM CHLORIDE 0.9 % (FLUSH) 0.9 %
10 SYRINGE (ML) INJECTION AS NEEDED
Status: DISCONTINUED | OUTPATIENT
Start: 2024-08-02 | End: 2024-08-02 | Stop reason: HOSPADM

## 2024-08-02 RX ORDER — HEPARIN SODIUM (PORCINE) LOCK FLUSH IV SOLN 100 UNIT/ML 100 UNIT/ML
500 SOLUTION INTRAVENOUS AS NEEDED
OUTPATIENT
Start: 2024-08-02

## 2024-08-02 RX ORDER — HEPARIN SODIUM (PORCINE) LOCK FLUSH IV SOLN 100 UNIT/ML 100 UNIT/ML
500 SOLUTION INTRAVENOUS AS NEEDED
Status: DISCONTINUED | OUTPATIENT
Start: 2024-08-02 | End: 2024-08-02 | Stop reason: HOSPADM

## 2024-08-02 RX ADMIN — IOPAMIDOL 75 ML: 612 INJECTION, SOLUTION INTRAVENOUS at 10:17

## 2024-08-02 RX ADMIN — Medication 500 UNITS: at 11:07

## 2024-08-02 RX ADMIN — Medication 10 ML: at 11:07

## 2024-08-06 ENCOUNTER — TELEPHONE (OUTPATIENT)
Dept: RADIATION ONCOLOGY | Facility: HOSPITAL | Age: 44
End: 2024-08-06
Payer: COMMERCIAL

## 2024-08-06 NOTE — TELEPHONE ENCOUNTER
Left a detailed message with a return phone number. Calling to reschedule her missed follow up appt with Dr Stephen.

## 2024-08-09 ENCOUNTER — TELEPHONE (OUTPATIENT)
Dept: ONCOLOGY | Facility: CLINIC | Age: 44
End: 2024-08-09

## 2024-08-09 NOTE — TELEPHONE ENCOUNTER
Caller: Carole Weaver    Relationship: Self    Best call back number: [phone]    Caller requesting test results: PT    What test was performed: CT SCAN    When was the test performed: 8-2-2024    Where was the test performed: [facility]    Additional notes: PT VIEWED RESULTS ON [facility] REQUESTING A CALL BACK TO GO OVER

## 2024-08-12 DIAGNOSIS — C20 ADENOCARCINOMA OF RECTUM: Primary | ICD-10-CM

## 2024-08-12 DIAGNOSIS — C78.02 MALIGNANT NEOPLASM METASTATIC TO BOTH LUNGS: ICD-10-CM

## 2024-08-12 DIAGNOSIS — C78.01 MALIGNANT NEOPLASM METASTATIC TO BOTH LUNGS: ICD-10-CM

## 2024-08-12 DIAGNOSIS — R93.89 ABNORMAL FINDING ON IMAGING: ICD-10-CM

## 2024-08-12 DIAGNOSIS — R91.8 MULTIPLE PULMONARY NODULES: ICD-10-CM

## 2024-08-12 LAB — REF LAB TEST METHOD: NORMAL

## 2024-08-14 ENCOUNTER — HOSPITAL ENCOUNTER (OUTPATIENT)
Dept: PET IMAGING | Facility: HOSPITAL | Age: 44
Discharge: HOME | End: 2024-08-14
Payer: COMMERCIAL

## 2024-08-14 ENCOUNTER — HOSPITAL ENCOUNTER (OUTPATIENT)
Dept: CARDIOLOGY | Facility: HOSPITAL | Age: 44
Discharge: HOME | End: 2024-08-14
Payer: COMMERCIAL

## 2024-08-14 ENCOUNTER — TELEPHONE (OUTPATIENT)
Dept: CARDIOLOGY | Facility: CLINIC | Age: 44
End: 2024-08-14
Payer: COMMERCIAL

## 2024-08-14 VITALS
WEIGHT: 204 LBS | OXYGEN SATURATION: 95 % | HEART RATE: 81 BPM | SYSTOLIC BLOOD PRESSURE: 124 MMHG | BODY MASS INDEX: 33.99 KG/M2 | DIASTOLIC BLOOD PRESSURE: 70 MMHG | HEIGHT: 65 IN

## 2024-08-14 DIAGNOSIS — R93.89 ABNORMAL FINDING ON IMAGING: ICD-10-CM

## 2024-08-14 DIAGNOSIS — C78.01 MALIGNANT NEOPLASM METASTATIC TO BOTH LUNGS: ICD-10-CM

## 2024-08-14 DIAGNOSIS — C20 ADENOCARCINOMA OF RECTUM: ICD-10-CM

## 2024-08-14 DIAGNOSIS — C78.02 MALIGNANT NEOPLASM METASTATIC TO BOTH LUNGS: ICD-10-CM

## 2024-08-14 DIAGNOSIS — R91.8 MULTIPLE PULMONARY NODULES: ICD-10-CM

## 2024-08-14 LAB
BH CV ECHO LEFT VENTRICLE GLOBAL LONGITUDINAL STRAIN: -21.9 %
BH CV ECHO MEAS - EDV(CUBED): 79.5 ML
BH CV ECHO MEAS - EDV(MOD-SP2): 91 ML
BH CV ECHO MEAS - EDV(MOD-SP4): 84 ML
BH CV ECHO MEAS - EF(MOD-BP): 65.5 %
BH CV ECHO MEAS - EF(MOD-SP2): 63.7 %
BH CV ECHO MEAS - EF(MOD-SP4): 70.2 %
BH CV ECHO MEAS - EF_3D-VOL: 55 %
BH CV ECHO MEAS - ESV(CUBED): 22.5 ML
BH CV ECHO MEAS - ESV(MOD-SP2): 33 ML
BH CV ECHO MEAS - ESV(MOD-SP4): 25 ML
BH CV ECHO MEAS - FS: 34.3 %
BH CV ECHO MEAS - IVS/LVPW: 0.92 CM
BH CV ECHO MEAS - IVSD: 1.1 CM
BH CV ECHO MEAS - LA 3D VOL INDEX: 13
BH CV ECHO MEAS - LAT PEAK E' VEL: 10.4 CM/SEC
BH CV ECHO MEAS - LV DIASTOLIC VOL/BSA (35-75): 42.1 CM2
BH CV ECHO MEAS - LV MASS(C)D: 173.6 GRAMS
BH CV ECHO MEAS - LV SYSTOLIC VOL/BSA (12-30): 12.5 CM2
BH CV ECHO MEAS - LVIDD: 4.3 CM
BH CV ECHO MEAS - LVIDS: 2.8 CM
BH CV ECHO MEAS - LVPWD: 1.2 CM
BH CV ECHO MEAS - MED PEAK E' VEL: 7.7 CM/SEC
BH CV ECHO MEAS - MV A DUR: 0.1 SEC
BH CV ECHO MEAS - MV A MAX VEL: 79.9 CM/SEC
BH CV ECHO MEAS - MV DEC SLOPE: 667.6 CM/SEC2
BH CV ECHO MEAS - MV DEC TIME: 0.17 SEC
BH CV ECHO MEAS - MV E MAX VEL: 89.2 CM/SEC
BH CV ECHO MEAS - MV E/A: 1.12
BH CV ECHO MEAS - MV MAX PG: 3.8 MMHG
BH CV ECHO MEAS - MV MEAN PG: 2.29 MMHG
BH CV ECHO MEAS - MV P1/2T: 42.5 MSEC
BH CV ECHO MEAS - MV V2 VTI: 23.8 CM
BH CV ECHO MEAS - MVA(P1/2T): 5.2 CM2
BH CV ECHO MEAS - PULM A REVS DUR: 0.1 SEC
BH CV ECHO MEAS - PULM A REVS VEL: 27.9 CM/SEC
BH CV ECHO MEAS - PULM DIAS VEL: 52.3 CM/SEC
BH CV ECHO MEAS - PULM S/D: 0.86
BH CV ECHO MEAS - PULM SYS VEL: 45 CM/SEC
BH CV ECHO MEAS - RAP SYSTOLE: 3 MMHG
BH CV ECHO MEAS - RVSP: 14.9 MMHG
BH CV ECHO MEAS - SV(MOD-SP2): 58 ML
BH CV ECHO MEAS - SV(MOD-SP4): 59 ML
BH CV ECHO MEAS - SVI(MOD-SP2): 29.1 ML/M2
BH CV ECHO MEAS - SVI(MOD-SP4): 29.6 ML/M2
BH CV ECHO MEAS - TAPSE (>1.6): 1.94 CM
BH CV ECHO MEAS - TR MAX PG: 11.9 MMHG
BH CV ECHO MEAS - TR MAX VEL: 172.7 CM/SEC
BH CV ECHO MEASUREMENTS AVERAGE E/E' RATIO: 9.86
BH CV VAS BP LEFT ARM: NORMAL MMHG
BH CV XLRA - TDI S': 8.4 CM/SEC
GLUCOSE BLDC GLUCOMTR-MCNC: 89 MG/DL (ref 70–130)
LEFT ATRIUM VOLUME INDEX: 14.3 ML/M2

## 2024-08-14 PROCEDURE — 93308 TTE F-UP OR LMTD: CPT

## 2024-08-14 PROCEDURE — 93321 DOPPLER ECHO F-UP/LMTD STD: CPT

## 2024-08-14 PROCEDURE — A9552 F18 FDG: HCPCS | Performed by: INTERNAL MEDICINE

## 2024-08-14 PROCEDURE — 0 FLUDEOXYGLUCOSE F18 SOLUTION: Performed by: INTERNAL MEDICINE

## 2024-08-14 PROCEDURE — 93356 MYOCRD STRAIN IMG SPCKL TRCK: CPT

## 2024-08-14 PROCEDURE — 82948 REAGENT STRIP/BLOOD GLUCOSE: CPT

## 2024-08-14 PROCEDURE — 93325 DOPPLER ECHO COLOR FLOW MAPG: CPT

## 2024-08-14 PROCEDURE — 78815 PET IMAGE W/CT SKULL-THIGH: CPT

## 2024-08-14 PROCEDURE — 25510000001 PERFLUTREN 6.52 MG/ML SUSPENSION 2 ML VIAL: Performed by: NURSE PRACTITIONER

## 2024-08-14 RX ADMIN — FLUDEOXYGLUCOSE F 18 1 DOSE: 200 INJECTION, SOLUTION INTRAVENOUS at 08:20

## 2024-08-14 RX ADMIN — PERFLUTREN 2 ML: 6.52 INJECTION, SUSPENSION INTRAVENOUS at 13:19

## 2024-08-14 NOTE — TELEPHONE ENCOUNTER
----- Message from Dawn Benton sent at 8/14/2024  3:45 PM EDT -----  Please let her know the echocardiogram is stable.  She has normal heart function.  No valvular abnormalities.

## 2024-08-14 NOTE — TELEPHONE ENCOUNTER
Attempted to call Carole Weaver, no answer.  Left a voicemail for patient to call back and ask to speak with the triage nurses.  Will continue to try to reach patient.    Alma Edmond RN  [facility] Triage  08/14/24 15:46 EDT

## 2024-08-14 NOTE — PROGRESS NOTES
Please let her know the echocardiogram is stable.  She has normal heart function.  No valvular abnormalities.

## 2024-08-15 NOTE — TELEPHONE ENCOUNTER
Pt called back. Went over results. She verbalized understanding.    Thank you,    Daksha Srinivasan, RN  Triage [facility]  08/15/24 09:31 EDT

## 2024-08-16 ENCOUNTER — OFFICE VISIT (OUTPATIENT)
Dept: ONCOLOGY | Facility: CLINIC | Age: 44
End: 2024-08-16
Payer: COMMERCIAL

## 2024-08-16 VITALS
BODY MASS INDEX: 31.95 KG/M2 | SYSTOLIC BLOOD PRESSURE: 114 MMHG | HEART RATE: 74 BPM | DIASTOLIC BLOOD PRESSURE: 72 MMHG | TEMPERATURE: 98.4 F | OXYGEN SATURATION: 97 % | RESPIRATION RATE: 16 BRPM | WEIGHT: 198.8 LBS | HEIGHT: 66 IN

## 2024-08-16 DIAGNOSIS — C78.00 MALIGNANT NEOPLASM METASTATIC TO LUNG, UNSPECIFIED LATERALITY: ICD-10-CM

## 2024-08-16 DIAGNOSIS — Z79.01 ANTICOAGULATION ADEQUATE: ICD-10-CM

## 2024-08-16 DIAGNOSIS — I26.99 OTHER PULMONARY EMBOLISM WITHOUT ACUTE COR PULMONALE, UNSPECIFIED CHRONICITY: ICD-10-CM

## 2024-08-16 DIAGNOSIS — D68.51 HETEROZYGOUS FACTOR V LEIDEN MUTATION: Chronic | ICD-10-CM

## 2024-08-16 DIAGNOSIS — Z79.899 ENCOUNTER FOR LONG-TERM (CURRENT) USE OF HIGH-RISK MEDICATION: ICD-10-CM

## 2024-08-16 DIAGNOSIS — C20 ADENOCARCINOMA OF RECTUM: Primary | ICD-10-CM

## 2024-08-16 NOTE — PROGRESS NOTES
REASON FOR FOLLOW UP:     Rectal cancer, rectal ultrasound examination in July 2019 reported T3N0 disease without lymphadenopathy. She does have small pulmonary nodule 6-7 mm and 2 mm with indeterminate feature. There is no solid evidence of metastatic disease otherwise. Patient has stage IIA (T3N0M0) disease.  The patient is heterozygous factor V Leiden, was on prophylactic anticoagulation with Lovenox 40 mg daily given her increased risk of thrombosis with MediPort and GI malignancy.   PET scan on 8/8/2019 reported an 8 mm hypermetabolic right upper lobe pulmonary nodule, which is suspicious for metastatic as well.    Patient had a PowerPort placement on the left upper chest by Dr. Joseph on 8/9/2019.  Patient was started on concurrent infusional 5-FU chemoradiation therapy on 8/12/2019, with planned complete surgical resection and further adjuvant chemotherapy with intention to cure the disease.   Patient underwent surgical resection of the primary rectal cancer by Dr. Ye on 12/2/2019.  Pathology evaluation reported residual T3N1 disease stage IIIb.  Diarrhea related to therapy and radiation.   Patient was started cycle 1 day 1 of adjuvant FOLFOX 6 on 1/23/2020.  On 2/5/2020 FOLFOX 6 cycle 2 delayed secondary to neutropenia.  Patient was given 3 days of Granix injection.  After cycle #2 we planned 3 days of Granix with each cycle.   Patient also intolerant of oral iron.  Patient received 2 doses of IV injectafer on 02/05/2020 and 02/12/2020.   02/12/2020 Proceed with cycle #2 of FOLFOX 6 with 3 days of Granix.  On 3/11/2020 cycle 4 postponed secondary to abdominal pain and occasional low-grade fevers.  CT scans ordered.  Cycle #4 resumed after CT scan revealed no evidence of disease.  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 Ye. patient has no fever.  Continue to observe.   Grade 3 hand-foot syndrome secondary to 5-FU after cycle #7  FOLFOX 6 chemotherapy, prompting ER visit.  Also has worsening peripheral neuropathy.   Cycle #8 FOLFOX 6 was given on 5/13/2020, with 50% dose reduction for both 5-FU and oxaliplatin, and also examination of bolus 5-FU.   PET scan examination on 5/21/2020 reported no evidence of metastatic disease in the chest abdomen pelvis.  Stable 6 mm RUL pulmonary nodule.  On 5/27/2020, I discussed with the patient that we can discontinue chemotherapy at this time.   Patient had a surgical procedure for low anterior colon resection, coloanal anastomosis on 8/3/2020.  CT scan for chest abdomen pelvis on 9/9/2020 reported a new 8 mm noncalcified pulmonary nodule in the left lower lobe of the lung.  Otherwise stable right upper lobe 6 mm pulmonary nodule, and no evidence of disease recurrence in the abdomen or pelvis.  PET scan examination on 9/18/2020 reported multiple hypermetabolic small pulmonary nodules/ new pulmonary nodules.   Patient was started on pelvic chemotherapy with FOLFIRI regimen on 10/13/2020.   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 It reported tumor mutation burden 5 Muts/Mb, microsatellite stable, TP53 mutation R282W, and others.   Cycle #5 was without irinotecan, due to peripheral neuropathy.  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  Patient was switched to weight-based Lovenox injection.  Cycle #6 5-FU and irinotecan was delayed by 2 weeks because of the above incident.  Patient had a telemedicine evaluation at that the [facility] [place] in February 2021.  They agreed with our treatment plan for palliative chemotherapy followed by maintenance chemotherapy.    PET scan examination on 4/6/2021 after cycle #12 palliative chemotherapy reported no evidence of hypermetabolic metastatic lesion.   Patient was started on maintenance chemotherapy with 5-FU and  Avastin on 4/13/2021. (Unable to tolerate Xeloda because of a significant hand-foot syndrome).   PET scan on 9/24/2021 obtained after cycle #12 maintenance chemotherapy with 5-FU, leucovorin, Avastin reported no evidence for active disease. We recommended 3 months chemotherapy holiday.  CT scan examination on 3/15/2022 reported slightly disease progression with increase in size of small pulmonary nodule.  We started patient back on maintenance chemotherapy on 3/29/2022 treatment with 5-FU plus leucovorin and Avastin, repeating every 2 weeks.  After 6 more maintenance chemotherapy, CT scan for chest abdomen pelvis was done on 6/21/2022 which reported stable sub-6 mm pulmonary nodules.  Patient had last maintenance chemotherapy on 6/7/2022.       HISTORY OF PRESENT ILLNESS:  The patient is a 44 y.o. female with the above-mentioned issue who presents for a 3-month follow-up evaluation, after laboratory studies and CT scan for chest, abdomen, pelvis obtained on 4/23/2025. Unfortunately, we could not obtain the Guardant reveal study then due to the timing.    The patient reports a satisfactory performance status, with ECOG 0. She continues to experience intermittent loose bowel movements, but denies any presence of melena or hematochezia. She also denies experiencing any abdominal pain, nausea, or vomiting. Furthermore, she denies experiencing any chest pain or dyspnea.    CT for chest abdomen pelvis with IV contrast on 4/23/2024 reported no evidence for disease progression.    Laboratory studies on 4/23/2021 reported normal CMP, and CBC together with a normal CEA 1.25 ng/mL.    History of Present Illness  The patient is here today, on 08/16/2024, following recent laboratory and imaging studies. The purpose of her visit is to discuss the results and formulate a management plan.    She reports that her health is stable and she has not experienced any new physical symptoms. She denies having a cough, coughing up blood  (hemoptysis), shortness of breath (dyspnea), or chest pain. Her overall health condition is at its usual state. She is employed full-time and does not report any instances of diarrhea, black tarry stools (melena), or bloody stools (hematochezia).    Results  Laboratory Studies from 8/2/2024  Guardant reveal study returned positive ctDNA with a low level <0.061%.     Lab study 8/2/2024 hemoglobin 13.8 platelets 180,000 WBC 5400 neutrophils 3800.    Imaging  8/2/2024 CT for chest showed scattered bilateral pulmonary nodules measuring up to 8 mm and not significant changes. There was also new geographic enhancement throughout the left hepatic lobe, suggesting SVC obstruction/occlusion.     8/14/2024 PET examination reported a new 8 mm right upper lobe lobe with a maximum SUV 3.7, an 8 mm nodule in the superior medial aspect of left lower lobe with SUV 2.4, and a right upper lobe nodule 6 mm with SUV 2.8. No hypermetabolic mediastinal or hilar adenopathy was observed. Heterogeneous activity was scattered throughout the liver but no suspicious foci were found. At the pelvis, there is a circumferential hypermetabolic activity at the distal colon approximately 9 cm segment with SUV 9.8. The presacral soft tissue thickening is low level activity SUV 2.7. There was no hypermetabolic lymphadenopathy within the pelvis.          Past Medical History:   Diagnosis Date    Abdominopelvic abscess 08/12/2020    ADMITTED TO [facility]    Abnormal Pap smear of cervix 02/02/1998    JULIUS I    Abscess of abdominal wall 11/28/2012    SEEN AT [facility] ER    Anemia in pregnancy     Anxiety     Bilateral epiphora 04/2020    Bilateral hand swelling 05/02/2020    SEEN AT [facility] ER    Cervical lymphadenitis 06/06/2012    SEEN AT [facility] ER    Chemotherapy induced diarrhea 01/2021    Chemotherapy induced neutropenia 04/2020    Chemotherapy-induced nausea 02/2020    Chemotherapy-induced thrombocytopenia 05/2020    Chronic anticoagulation     Chronic diarrhea      Colon polyps     FOLLOWED BY DR. 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 ALEXANDRU HUNT    History of gestational diabetes     History of pre-eclampsia 1998    History of pre-eclampsia     History of radiation therapy 2019    FOLLOWED BY DR. 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    Perirectal abscess 08/12/2020    Pulmonary embolism without acute cor pulmonale 09/20/2019    X 3; ADMITTED TO [facility]    Pulmonary nodule, right 05/2020    Rectal cancer 07/08/2019    STAGE IIA, INVASIVE MODERATELY DIFFERENTIATED ADENOCARCINOMA, CHEMO AND XRT FINISHED 9/2019    Right shoulder pain  2020    SEEN AT [facility] ER    Right ureteral stone 10/01/2019    SEEN AT [facility] ER    SAB (spontaneous ) 1996     A2-1 INDUCED    Sciatica     Sepsis due to cellulitis 2002    LEFT GREAT TOE, ADMITTED TO [facility]    Tachycardia 2020    Thrombosis of superior vena cava 2020    AND BRACHIOCEPHALIC VEIN, ADMITTED TO [facility]    Urinary urgency 2020   Patient had COVID-19 infection diagnosed on 2022 despite was fully vaccinated and boosted.  She recovered without problem.      Past Surgical History:   Procedure Laterality Date    ABDOMINAL SURGERY      CHOLECYSTECTOMY N/A 10/10/2003    LAPAROSCOPIC WITH CHOLANGIOGRAM, DR. JAMEY TALAVERA AT [facility]    COLON RESECTION N/A 2019    Procedure: laparoscopic low anterior colon resection with mobilization of splenic flexure and diverting loop ileostomy: ERAS;  Surgeon: Padmaja Esparza MD;  Location: [facility];  Service: General    COLON RESECTION N/A 2020    Procedure: LOW ANTERIOR COLON RESECTION, COLOANAL ANASTOMOSIS, MOBILIZATION SPLENIC FLEXURE;  Surgeon: Padmaja Esparza MD;  Location: [facility];  Service: General;  Laterality: N/A;    COLONOSCOPY N/A 07/15/2020    PATENT ANASTAMOSIS IN MID RECTUM, RESCOPE IN 1 YR, DR. PADMAJA ESPARZA AT [facility]    COLONOSCOPY N/A 2022    DIFFUSE AREA OF MODERATELY FRIABLE MUCOSA IN ENTIRE COLON , DIVERSION COLITIS, PATENT AND HEALTHY ANASTAMOSIS, DR. PADMAJA ESPARZA AT [facility]    COLONOSCOPY N/A 2023    6 MM SESSILE SERRATED ADENOMA POLYP IN DESCENDING, PATENT ANASTAMOSIS IN DISTAL RECTUM, RESCOPE IN 2 YRS, DR. PADMAJA ESPARZA AT [facility]    COLONOSCOPY W/ POLYPECTOMY N/A 2019    15 MM TUBULOVILLOUS ADENOMA POLYP IN HEPATIC FLEXURE, 20 MMTUBULOVILLOUS ADENOMA WITH HIGH GRADE DYSPLASIA IN RECTOSIGMOID, 4 CM MASS IN MID RECTUM, PATH: INVASIVE MODERATELY DIFFERENTIATED ADENOCARCINOMA, DR. JENNIFER LI AT [facility]    DILATATION AND EVACUATION N/A     ENDOSCOPY N/A 2019     LA GRADE A ESOPHAGITIS, GASTRITIS, ALL BIOPSIES BENIGN, DR. JENNIFER LI AT [facility]    ILEOSTOMY CLOSURE N/A 11/14/2022    Procedure: ILEOSTOMY TAKEDOWN;  Surgeon: Geronimo Ye MD;  Location: [facility] MAIN OR;  Service: General;  Laterality: N/A;    INCISION AND DRAINAGE PERIRECTAL ABSCESS N/A 08/14/2020    Procedure: INCISION AND DRAINAGE OF retrorectal dehiscence abcess with drain placement and irrigation;  Surgeon: Geronimo Ye MD;  Location: [facility] MAIN OR;  Service: General;  Laterality: N/A;    PAP SMEAR N/A 01/24/2014    SIGMOIDOSCOPY N/A 07/24/2019    INFILTRATIVE PARTIALLY OBSTRUCTING LARGE RECTAL CANCER, AREA TATOOED, DR. GERONIMO YE AT [facility]    SIGMOIDOSCOPY N/A 11/23/2019    AN ULCERATED PARTIALLY OBSTRUCTING MASS IN MID RECTUM, AREA TATTOOED, DR. GERONIMO YE AT [facility]    SIGMOIDOSCOPY N/A 07/22/2021    PATENT ANASTAMOSIS IN DISTAL RECTUM, RESCOPE IN 1 YR, DR. GERONIMO YE AT [facility]    TRANSRECTAL ULTRASOUND N/A 07/24/2019    Procedure: ULTRASOUND TRANSRECTAL;  Surgeon: Geronimo Ye MD;  Location: [facility] ENDOSCOPY;  Service: General    URETEROSCOPY LASER LITHOTRIPSY WITH STENT INSERTION Right 10/30/2019    DR. ESTUARDO BEASLEY AT [place]    VAGINAL DELIVERY N/A 09/18/1998    VENOUS ACCESS DEVICE (PORT) INSERTION Left 08/09/2019    Procedure: INSERTION VENOUS ACCESS DEVICE;  Surgeon: Sj Joseph MD;  Location: [facility] OR [facility];  Service: General    VENOUS ACCESS DEVICE (PORT) INSERTION N/A 12/18/2020    Procedure: INSERTION VENOUS ACCESS DEVICE right side, removal venous access device left side;  Surgeon: Sj Joseph MD;  Location: [facility];  Service: General;  Laterality: N/A;    WISDOM TOOTH EXTRACTION Bilateral 1993       HEMATOLOGIC/ONCOLOGIC HISTORY:      The patient reports she has intermittent rectal bleeding and urgency, mucous with her stool, starting sometime in 2016. At that time she was referred to GI service, and was diagnosed of irritable bowel syndrome.  Nevertheless she had worsening urgency for bowel movement, and had a couple of incidents losing control of stool. She was recently seen by Roland Thorpe MD again and had colonoscopy and EGD exam on 07/08/2019. She was found having a circumferential rectal mass. According to the procedure note, the patient had a fungating circumferential bleeding 4 cm mass in the middle rectum, at distance between 13 cm and 17 cm from the anus. Mass was causing partial obstruction. There were also colon polyps found at the hepatic flexure and also at the rectosigmoid junction 23 cm from the anus. Both were resected and retrieved. EGD examination reported grade A esophagitis at the GE junction and patchy discontinuous erythema and aggravation of the mucosa without bleeding in the stomach body. There is normal mucosa of the duodenum. Pathology evaluation from this procedure reported moderately differentiated adenocarcinoma involving the rectal mass. The rectal sigmoid junction polyp was tubular/tubulovillous adenoma with high grade dysplasia negative for invasive malignancy. The hepatic flexure polyp was also tubular/tubulovillous adenoma negative for high grade dysplasia or malignancy. The biopsy from the esophagus reports squamocolumnar mucosa with inflammatory changes suggestive of mild reflux esophagitis, negative for interstitial metaplasia. Gastric biopsy was benign and duodenal biopsy was also benign. There is no mention of H-pylori.     Patient was subsequently referred to colorectal surgeon Padmaja Ye MD for further evaluation. The patient had CT scan examination for chest, abdomen and pelvis requested by Dr. Ye and were done on 07/13/2019. The study reported no evidence of lymphadenopathy in the abdomen and pelvis. There was wall thickening of the rectosigmoid junction. Normal spleen, pancreas, adrenal glands and kidneys. There was fatty liver infiltration but no focal lymphatic lesions. There was a small 6-7 mm  noncalcified nodule in the right upper lobe and a tiny 3 mm subpleural nodule in the right middle lobe. No mediastinal or hilar lymphadenopathy.     Dr. Ye performed staging rectal ultrasound examination. This study reported tumor penetrating through the muscularis propria as T3 disease; there were no lymph nodes identified.    She had a hospitalization in late September 2019 because of newly discovered pulmonary emboli.  She was on prophylactic Lovenox prior to the incident of PE.  Now she is on full dose Xarelto anticoagulation.  Patient reports no further chest pain dyspnea.  She denies bleeding or bruising.  During her hospitalization, she was seen by Dr. Ye, who plans to have surgery 8 to 12 weeks after finishing radiation therapy.  She finished her radiation on 9/19/2019.     I noticed patient also presented to the emergency room on 10/1/2019 complaining of right flank area pain.  I reviewed the images studies and indeed she has a very small 1 to 2 mm obstructing kidney stone in the UV junction.  Patient is still symptomatic with some pains and dysuria.  She denies fever sweating or chills.    Laboratory study on 10/7/2019 reported normal CBC including hemoglobin 13.1, platelets 301,000, WBC 6170 and ANC 4900 lymphocytes 590 monocytes 480.      The nurse reported malfunction of port-a-catheter on 10/7/2019.  Port study on 10/8/2019 showed fibrin sheath around the distal aspect of the Mediport catheter in the SVC. This does not appear to hinder injection, but does prevent aspiration at this time.    Patient underwent surgical resection of the colon on 12/2/2019 with Dr. Ye.  Pathology evaluation reported residual T3 disease, also 1 out of 14 lymph nodes positive for malignancy.  So this patient in either had at least stage IIIb disease (T3 N1 M0?).      Adjuvant chemotherapy FOLFOX 6 will be started on 1/22/2020.  Laboratory study reported iron saturation 10%, free iron 31 TIBC 319 and ferritin 168.   Her hemoglobin was 11.8, WBC 5600, and platelets 347,000.    Patient was here on 02/12/2020 for cycle 2 of her FOLFOX.  This is delayed x1 week secondary to neutropenia.  The patient ultimately received 3 days worth of Neupogen with recovery of her blood counts.  Of note, the patient struggled with significant nausea without any episodes of vomiting following cycle #1 of chemotherapy resulting in significant weight loss.  She is up 12 pounds over the last week as her appetite has normalized.  We will add Emend to her care plan.    She is having several loose stools per her colostomy and has been hesitant to take Imodium due to prior history of constipation.  I encouraged her to try this with a maximum of 8 tablets/day.  She denies any infectious symptoms including fevers or chills.  No excess bleeding or bruising noted.  She had the expected cold sensitivity related to the Oxaliplatin for about 3 days following treatment.    Labs from 02/12/2020 demonstrated total white blood cell count of 5.14, ANC of 3.06, hemoglobin of 11.2, platelets of 211,000.  She was found to be iron deficient last week and is intolerant to oral iron secondary to GI distress.  For this reason, she initiated IV iron therapy with Injectafer last week.  She had received her second dose 02/12/2020    Patient has normalized hemoglobin 12.2 on 2/26/2020.    She reported on 5/5/2020 she had a recent visit to the emergency department for what was suspected to be an allergic reaction.  She called our on-call service on Saturday with reports of hand and face swelling.  She was instructed to proceed to the emergency department at which time she was assessed and prescribed a Medrol Dosepak.  She had just completed her 5-FU and was unhooked on Friday, 5/3/2020.  Her symptoms have improved.  She does report persistent hyperpigmentation and mild swelling of the palms of the hands but this is much improved.  It was her right hand specifically that was  swollen.  Her facial swelling has resolved.  She continues on Cefdinir nd since has 1 day remaining, she was prescribed cefdinir for a UTI requiring hospitalization at the end of April.  Reports no new symptoms.  Her labs are stable.  She is scheduled for treatment again.    Patient states at this time she is not tolerating her chemotherapy well.     Patient seen in the emergency department on 5/2/2020 for what was suspected to be an allergic reaction.  She called our on-call service on Saturday explaining that she was experiencing hand and face swelling.  She was instructed to proceed to the emergency department at which time she was assessed and prescribed a Medrol Dosepak.  She had just completed her 5-FU and was unhooked on Friday, 5/1/2020.      She reports since her ED visit on 5/2/2020 her symptoms have not improved. Her hands and feet were swollen upon presenting to the ED and she could barely make a fist. She states that she feels so swollen she is not able to stand it. She states that her skin on the hands and feet are peeling extensively as well, besides changing color to more dark.     She also reports significant fatigue after her first week of the 5-FU treatment but she expected this side effect. She also notices significant increase in her neuropathy to the point that she is not able to even walk around in her home without her house slippers due to irritation from her rugs. She denies and associated nausea or vomiting at this time. She also has episodes of epistaxis every day after the chemotherapy cycle #7.  She does report working full-time during the week of chemotherapy.    Laboratory studies, 5/13/2020, show moderate thrombocytopenia platelets 123,000, low normal WBC 4140 including ANC 2720 and normal hemoglobin 13.4.  Because significant hand-foot syndrome, will decrease both 5-FU and oxaliplatin by 50%, and eliminate bolus dose of 5-FU.    On 5/21/2020 patient had a PET scan performed which  showed no convincing evidence of residual disease in abdomen or pelvis, no metastatic disease within the chest or neck.     Cycle #8 FOLFOX 6 was given on 5/13/2020, with 50% dose reduction for both 5-FU and oxaliplatin, and also examination of bolus 5-FU.  She states on 5/27/2020 that with the recent reduction of the chemotherapy she feels significantly better. She has more energy and is able to do her daily routine.      PET scan examination on 5/21/2020 reported no evidence of metastatic disease in chest abdomen pelvis.  There was a stable 6 mm right upper lobe pulmonary nodule.    Laboratory studies on 5/27/2020 showed mild leukocytopenia WBC 3720 but a normal ANC 2250 and lymphocytes 630.  Normal platelets 163,000 and hemoglobin 12.6.  Chemistry lab reported normal renal function, liver function panel and a electrolytes, elevated glucose 164.    Laboratory studies 6/24/2020, showed normal hemoglobin 13.4 but macrocytosis .9.  Normal platelets 210,000 and WBC 5870.  She had normal CMP.     Patient last time was here in late June 2020.  Since that time she had surgical procedure for low anterior colon resection, coloanal anastomosis on 8/3/2020.  She later developed a perirectal abscess, required surgical drainage on 8/14/2020.  She was discharged on 8/27/2020.    Patient reports to me she still has lower abdominal wall vacuum suction in place.  She denies fever sweating chills.  Performance status is ECOG 1.  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  She does have visiting nurse come to the home for wound care.    CT scan for chest abdomen pelvis on 9/9/2020 reported a new 8 mm noncalcified pulmonary nodule in the left lower lobe.  Otherwise stable right upper lobe 6 mm pulmonary nodule, and no evidence of disease recurrence in the abdomen or pelvis.     Laboratory study on 9/16/2020 reported elevated AST 55, ALT 95, alk phosphatase 143 but normal total bilirubin 0.3.   Chemistry lab otherwise unremarkable.  She has completed normal CBC.      Because of the abnormal CT scan examination for chest abdomen pelvis on 9/9/2020 reported a new 8 mm noncalcified pulmonary nodule in the left lower lobe, we obtained a PET scan examination for further evaluation, which was done on 9/18/2020.  This study reported several pulmonary nodules, some of them are hypermetabolic, newly developed compared to previous PET scan in May 2020.  Certainly does highly suspicious for metastatic disease.  There are also hypermetabolic activity in the abdomen and pelvic area which is hard to differentiate from surgical scar versus metastatic malignancy.    Laboratory study on 10/13/2020 reported normal CBC with Hb 13.9, platelets 302,000 and WBC 5520 including ANC 3830.  Chemistry lab reported normalized AST 20, alk phosphatase 116 improved ALT 35, and maintains normal bilirubin, with normal electrolytes and liver function panel.     Patient was started on first cycle of palliative chemotherapy FOLFIRI on 10/13/2020.    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  Patient had a new port placed 12/18/2020, and had held her Xarelto for 2 days prior to surgery.  She presented to the ER on 12/21/20 with complaints of facial and arm swelling for 3 days.  She also noted increased shortness of breath.  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  Thrombus within the right internal jugular vein cannot be excluded.  Patient was started on IV heparin, and eventually transitioned to weight-based Lovenox, which she now continues.    PET scan examination on 9/24/2021 reported further shrinking of the tiny right upper lobe pulmonary nodule.  Otherwise no new lesions.  No evidence for metastatic disease in other areas.      Patient had CT  scan for chest with IV contrast obtained on 12/14/2021 which reported small tiny stable pulmonary nodules. There is a 4 mm right upper lobe pulmonary nodule. There is also a stable 4 mm left lower lobe pulmonary nodule. There is also stable left upper lobe micronodule.     Laboratory studies today on 12/21/2021 reported normal hemoglobin 15.9 however .0, platelets 218,000 WBC 5360 including neutrophils 3730 lymphocytes 980. Chemistry lab reported normal renal function, electrolytes, glucose, and marginally elevated ALT 55, AST 34 but normal total bilirubin and alk phosphatase.     CT scan for chest abdomen pelvis 6/21/2022 reported sub-6 mm pulmonary nodules either stable or slightly smaller.  No new pulmonary nodules or evidence for disease progression.  There is no evidence for metastatic disease in the liver however it shows diffuse hepatic steatosis.  There was masslike thickening in the presacral space with the clips or calcification approximately 1.7 cm in greatest AP dimension but stable.    Laboratory study on 9/20/2022 reported normal hemoglobin 15.7 with mild macrocytosis .1.  She has normal CBC and platelets.  She also has unremarkable CMP.  Normal CEA 1.13 ng/mL.    Patient was seen by Dr. Ye, who performed ileostomy takedown on 11/14/2022.  Patient reports that she is recovering.  She still has a small open wound less than 1 cm in diameter, however close to 2 cm deep.  She has been changing dressing herself.  She denies fever sweating chills.    Patient has no other specific complaints.  She has excellent performance status ECOG 0.  She denies chest pain dyspnea cough hemoptysis.  No abdominal pain.  No melena hematochezia.  Patient has been eating well, stable weight.  She works full-time.    She continues Lovenox injections and denies any significant bleeding issues.   No other new problems or concerns.     CT scan for chest abdomen pelvis obtained on 1/10/2023 reported stable small  pulmonary nodules, no evidence for active or new disease.    Laboratory study on 1/10/2023 reported normal CBC and normal CMP.  CEA level is 0.99 ng/mL.    CT scan for chest, abdomen, and pelvis on 7/7/2023 reported stable small pulmonary nodules including a left upper lobe 5 mm nodule. There were no new masses, or pleural effusion. No enlarged supraclavicular, axillary, mediastinal, or hilar lymphadenopathy. Mediastinal vasculature normal. There is occlusion of the superior vena around the catheter with body wall  is present. There was no evidence for disease recurrence in the abdomen or pelvis. Bone is also negative for metastatic disease.    Laboratory studies obtained on 07/07/2023 also reported normal CBC, and normal CMP as well as low level CEA 1.07 ng/mL.    The CT scan for the chest on 10/27/2023 reported enlarging pulmonary nodules bilaterally. The right upper lobe lung nodule increased from 7 x 7 mm to 9 x 8 mm, and the left upper lobe nodule measured 8 x 9 mm from 5 x 6 mm in 04/2023. There is a different left upper lobe nodule 6 x 8 mm from previous 5 x 5 mm. The presacral tissue thickness measures up to 2.3 cm.    A laboratory study on 10/27/2023 reported CEA 1.58 ng/mL, normal CBC, and CMP.  Molecular study of peripheral blood Guardant Reveal is still pending.    The patient presents today on 11/17/2023 for reevaluation to discuss the results of PET scan examination as well as the results of tumor conference. I saw her recently on 11/03/2023 and because of enlarging pulmonary nodules on the CT scan, we requested a PET scan examination. I presented her case yesterday on 11/16/2023 at the [facility].    The patient reports she is at her baseline condition as 2 weeks ago. No specific complaints, no chest pain, dyspnea, cough, etc. She has a regular bowel movement.    Her case was present at the [facility]. on 11/16/2023. The PET scan images and chest CT  scan were reviewed in conjunction with her treatment history. The consensus was for patient to receive stereotactic radiation therapy for the right upper lobe lung lesion, and the bigger left upper lobe lesion, and monitor the smaller left upper lobe lesion with further images studies down the line. Also, the conference recommended Guardant Reveal study which we already ordered.     This Guardant Reveal study came back today as negative for ctDNA 0%.        CT of the chest on 2/2/2024 reported shrinking of the right upper lobe lesion from 9 mm to 6 mm, and the left upper lobe lesion also decreased from 9 mm to 6 mm. Otherwise, there are a couple of stable pulmonary nodules, 7 to 8 mm. The right hilar has a small lymph node that has increased from 6 mm to 8 mm and is otherwise stable. There was no suspicious lesion in the abdomen or pelvis.      MEDICATIONS    Current Outpatient Medications:     clonazePAM (KlonoPIN) 1 MG tablet, Take 1 tablet as needed daily for anxiety. May take one additional as needed for severe anxiety., Disp: 45 tablet, Rfl: 4    colestipol (Colestid) 5 g granules, Take 5 g by mouth 2 (Two) Times a Day., Disp: 300 g, Rfl: 0    Cyanocobalamin (VITAMIN B-12 PO), Take 1 tablet by mouth Every Other Day., Disp: , Rfl:     dicyclomine (BENTYL) 20 MG tablet, TAKE 1 TABLET BY MOUTH EVERY 6 (SIX) HOURS AS NEEDED FOR IRRITABLE BOWEL SYMPTOMS, Disp: 360 tablet, Rfl: 2    diphenoxylate-atropine (LOMOTIL) 2.5-0.025 MG per tablet, TAKE 2 TABLETS BY MOUTH 4 TIMES A DAY, Disp: 240 tablet, Rfl: 0    Enoxaparin Sodium (LOVENOX) 100 MG/ML solution prefilled syringe syringe, Inject 1 mL under the skin into the appropriate area as directed Every 12 (Twelve) Hours., Disp: 60 mL, Rfl: 2    escitalopram (LEXAPRO) 10 MG tablet, Take 1 tablet by mouth Daily., Disp: 90 tablet, Rfl: 1    famotidine (PEPCID) 20 MG tablet, TAKE 1 TABLET BY MOUTH TWICE A DAY, Disp: 180 tablet, Rfl: 2    Imvexxy Maintenance Pack 10 MCG  "insert, Insert 10 mcg into the vagina 2 (Two) Times a Week., Disp: , Rfl:     Loperamide HCl (IMODIUM PO), Take  by mouth As Needed., Disp: , Rfl:     Loratadine 10 MG capsule, Take 1 capsule by mouth Every Evening., Disp: , Rfl:     metoprolol succinate XL (TOPROL-XL) 25 MG 24 hr tablet, TAKE 0.5 TABLETS BY MOUTH EVERY NIGHT, Disp: 45 tablet, Rfl: 2    ondansetron (ZOFRAN) 8 MG tablet, TAKE 1 TABLET BY MOUTH THREE TIMES A DAY AS NEEDED FOR NAUSEA AND VOMITING, Disp: 60 tablet, Rfl: 1    rosuvastatin (CRESTOR) 5 MG tablet, Take 1 tablet by mouth Daily., Disp: 90 tablet, Rfl: 0    ALLERGIES:   No Known Allergies    SOCIAL HISTORY:       Social History     Tobacco Use    Smoking status: Every Day     Current packs/day: 1.00     Average packs/day: 1 pack/day for 25.0 years (25.0 ttl pk-yrs)     Types: Electronic Cigarette, Cigarettes     Passive exposure: Current    Smokeless tobacco: Never    Tobacco comments:     1 PPD/caffeine use    Vaping Use    Vaping status: Some Days    Substances: Nicotine    Devices: Disposable    Passive vaping exposure: Yes   Substance Use Topics    Alcohol use: Not Currently     Comment: RARELY    Drug use: Never         FAMILY HISTORY:   Mother has positive factor V Leiden mutation, although she did not have thrombosis, mother also is coronary disease with stenting, she also is occasional bruising.    Maternal grandmother had DVT, she had multiple surgical procedures.    Patient mother's half-brother had metastatic colon cancer at diagnosis in his 50s.    Maternal great aunt had breast cancer.           Vitals:    08/16/24 1529   BP: 114/72   Pulse: 74   Resp: 16   Temp: 98.4 °F (36.9 °C)   TempSrc: Oral   SpO2: 97%   Weight: 90.2 kg (198 lb 12.8 oz)   Height: 167.6 cm (66\")   PainSc: 0-No pain         5/3/2024     2:40 PM   Current Status   ECOG score 0     Physical Exam    Physical Exam  Vitals reviewed.   Constitutional:       Appearance: Normal appearance. She is well-developed. "   HENT:      Head: Normocephalic and atraumatic.      Comments:         Nose: Nose normal.   Eyes:      Conjunctiva/sclera: Conjunctivae normal.   Cardiovascular:      Rate and Rhythm: Normal rate and regular rhythm.      Heart sounds: Normal heart sounds.   Pulmonary:      Effort: Pulmonary effort is normal.      Breath sounds: Normal breath sounds.   Abdominal:      General: Bowel sounds are normal.      Palpations: Abdomen is soft. There is no mass.      Tenderness: There is no abdominal tenderness.   Musculoskeletal:      Right lower leg: No edema.      Left lower leg: No edema.   Neurological:      Mental Status: She is alert. Mental status is at baseline.   Psychiatric:         Mood and Affect: Mood normal.         RECENT LABS:    Lab Results   Component Value Date    WBC 5.40 08/02/2024    HGB 13.8 08/02/2024    HCT 42.5 08/02/2024    MCV 93.6 08/02/2024     08/02/2024     Lab Results   Component Value Date    GLUCOSE 93 08/02/2024    BUN 9 08/02/2024    CREATININE 0.72 08/02/2024    EGFR 105.2 08/02/2024    BCR 12.5 08/02/2024    K 3.9 08/02/2024    CO2 25.7 08/02/2024    CALCIUM 8.8 08/02/2024    ALBUMIN 3.7 08/02/2024    BILITOT 0.3 08/02/2024    AST 18 08/02/2024    ALT 14 08/02/2024     Lab Results   Component Value Date    CEA 1.15 08/02/2024                        IMAGING:  NM PET/CT Skull Base to Mid Thigh  F-18 FDG PET SKULL BASE TO MID THIGH WITH PET CT FUSION.     HISTORY: 44-year-old female with rectal cancer. Restaging.     TECHNIQUE: Radiation dose reduction techniques were utilized, including  automated exposure control and exposure modulation based on body size.   Blood glucose level at time of injection was 89 mg/dL. 6.8 mCi of F-18  FDG were injected and PET was performed from skull base to mid thigh. CT  was obtained for localization and attenuation correction. Time at  injection 8:00 a.m. PET start time 9:22 a.m. Normalization method:  patient weight. Compared with compared with  PET/CT 11/08/2023, chest CT  08/02/2024, CTs of the chest abdomen pelvis 04/23/2024.     FINDINGS: Mediastinal blood pool has a maximal SUV of 2.4, previously  2.4.     1. There are new pulmonary nodules since the previous PET/CT and there  are nodules which have increased in size. A new 8 mm right upper lobe  nodule has an SUV of 3.7. An 8 mm nodule at the superior medial aspect  of the left lower lobe previously measured 5 mm and has an SUV of 2.4,  previously 1.5. Hypermetabolic upper lobe nodules on the previous PET/CT  appear to have been treated. There is a 6 mm right upper lobe nodule  adjacent to a bandlike scar with an SUV of 2.8. There is no  hypermetabolic mediastinal or hilar lymphadenopathy.     2. There is heterogeneous activity scattered throughout the liver, but  there are no suspicious foci. There is no hypermetabolic lymphadenopathy  within the abdomen.     3. At the pelvis, there is circumferential hypermetabolic activity at  the distal colon extending to the anus, approximately 9 cm segment of  colon with an SUV of 9.8. There is a fairly large volume of formed stool  within this segment and the finding is indeterminate as recent enemas or  stercoral chronic colitis may have this appearance. Correlation with  endoscopy is recommended. The presacral soft tissue thickening has  low-level activity with an SUV of 2.7.  There is no hypermetabolic lymphadenopathy within the pelvis.     4. There is no suspicious hypermetabolic activity at the neck. No  significant change in the mucous retention cysts at both maxillary  sinuses.     This report was finalized on 8/15/2024 8:20 AM by Dr. Caroline Solano M.D on  Workstation: [id number]             Assessment & Plan  1. Rectal cancer.      2. Anemia.  Laboratory study on 08/02/2024 reported normal hemoglobin at 13.8. Hemoglobin is expected to deteriorate once chemotherapy resumes.    Follow-up  She will follow up on 09/18/2024 with a laboratory study prior to her  cycle number 3 chemotherapy.        ASSESSMENT:   1.  Metastatic rectal cancer.   Initial rectal ultrasound examination reported T3N0 disease without lymphadenopathy.   CT scan of chest, abdomen and pelvis reported no lymphadenopathy in the abdomen, pelvis or chest. She does have small pulmonary nodule 6-7 mm and 2 mm with indeterminate feature. There is no solid evidence of metastatic disease otherwise.   Based on the CT scan and rectal ultrasound imaging studies, this patient had stage IIA (T3N0M0) disease.   She had PET scan examination on 8/8/2019 which reported a hypermetabolic right upper lobe pulmonary nodule 6 mm with SUV 5.6.  This is suspicious for metastatic disease however it is too small to be biopsied.  This patient may have stage IV disease.   She initiated concurrent radiation with continuous 5-FU on 8/12/2019.  Patient finished concurrent chemoradiation therapy.  Patient underwent surgical resection of the rectal tumor and diverting loop ileostomy on 12/2/2019 with Dr. Ye.  Pathology evaluation reported residual T3 disease, with 1 out of 14 lymph nodes positive for malignancy.  Certainly this patient has at least stage IIIb rectal cancer (T3N1M0?)  On 1/22/2020 adjuvant chemotherapy FOLFOX 6 regimen initiated.   On 2/5/2022 cycle #2 was delayed secondary to neutropenia.  She was given 3 days of Granix.   Emend added with cycle 3.  With improved nausea control  Continuing home Granix x3 days following 5-FU disconnect  3/11/2020 due for cycle 4, however, she is experiencing progressive abdominal pain and occasional fevers.   CT scan performed on 3/13/2020 reveals no evidence of progressive or recurrent disease.  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  Patient hospitalized 4/24-4/26/20 after cycle 5 of chemotherapy with acute UTI.  CT abdomen/pelvis noting fluid collection in the presacral region having diminished in size compared to CT dated 3/13/2020.  Patient was evaluated by Dr. Ye while in  the hospital with further plans to evaluate possible colovaginal fistula following completion of chemotherapy.  Patient did respond to IV antibiotics and discharged home on oral cefdinir.  4/29/2020 cycle 6 of FOLFOX.  Urinary symptoms have resolved   Patient seen in [facility] ED on 5/2/2020 for what was suspected to be an allergic reaction.  She called our service on Saturday explaining that she was experiencing hand and face swelling.  She was instructed to proceed to the emergency department at which time she was assessed and prescribed a Medrol Dosepak.  She had just completed her 5-FU and was unhooked on Friday, 5/1/2020.  Her symptoms have resolved in the office on assessment, 5/5/2020.  The patient had grade 3 hand-foot syndrome based on symptomology.  Patient had cycle #8 of 5-FU on 5/13/2020. Due to her symptoms and poor tolerance to the 5-FU, her treatment dose will be reduced 50% for oxaliplatin and infusional 5-FU.  Bolus 5-FU will be discontinued..  On 5/21/2020 patient had a PET scan and it showed no evidence of of metastatic disease in the neck, chest, abdomen or pelvis.  There was fluid accumulation/abscess.   On 5/27/2020 discussed with the patient that we can discontinue chemotherapy at this time.  She will follow-up with Dr. Ye to discuss a possibility to reverse the ileostomy.  We likely will obtain anther PET scan in 3 to 4 months for reassessment.    Patient was seen by Dr. Ye on 6/19/2019 for evaluation and to discuss possible take down of her ileostomy.  She is scheduled to have a gastrografin enema on 7/2/2020 to evaluate for a fistula, and then a colonoscopy on 7/15/2020, both done by Dr. Ye.  She states that based on the above imaging and procedure findings, she may have a more extensive surgery done or just have her ileostomy reversed, which would likely be done in August 2020.  This will all be coordinated under the care of Dr. Ye.     CT scan from 09/09/2020 and also  compared to her previous PET scan examination from 05/21/2020 and also the original PET scan from 08/09/2019.  The original PET scan there is a small right upper lobe pulmonary nodule 6 mm with SUV 5.6. So in the 05/2020 PET scan the nodule is still there but activity seems much less and this most recent CT scan examination also documented the preexisting small pulmonary nodule however there is a new left lower lobe pulmonary nodule 8 mm in size and I shared with the patient today this nodule is suspicious for metastatic disease. Laboratory studies reported minimal CEA level 1.32 on 09/09/2020. Liver function panel was only marginally abnormal with the ALT 45, the remaining is normal. However laboratory study today showed worsening AST 55, ALT 95 and alkaline phosphatase 143 but is still normal, total bilirubin 0.3.   Recommended to have repeat PET scan examination for assessment because the left lower lobe pulmonary nodule is too small to be biopsied, and if the PET scan reports hypermetabolic lesion, I recommended to have stereotactic radiation therapy. Explained to patient that she is not a really good candidate to have thoracotomy for resection because she still has unhealed wound in the abdomen. Stereotactic radiation therapy will likely be a more feasible choice.   PET scan examination obtained on 9/18/2020 showed metastatic disease.  It reported a few hypermetabolic pulmonary nodules, and some new small pulmonary nodules, all of them highly suspicious for metastatic disease.  She also has hypermetabolic activity in the abdomen and pelvic area which could be related to scar tissue from her recent surgery versus metastatic disease.  Nevertheless overall picture fits with metastatic cancer.  Discussed with the patient on 9/20/2020, we reviewed the PET scan images together, and I recommended to have systematic chemotherapy, I do not think stereotactic reading therapy to the hypermetabolic lesions in the lungs  warranted at this time because even those will be treated with reading therapy, she will still need systematic chemotherapy.  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  I did discuss with the patient using anti-EGFR monoclonal antibody versus anti-VEGF monoclonal antibody.     Palliative chemotherapy cycle#1 FOLFIRI was started on 10/13/2020.  No bolus 5-FU given considering previous poor tolerance.    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  Microsatellite stable, mutation burden 5/Mb.   Discussed with the patient 10/27/2020, because of the K-s[place]ia mutation, she is not a candidate for anti-EGFR monoclonal antibody such as Erbitux or vectibix.  She could be a candidate for anti-VEGF monoclonal antibody, however because of active wound, she is not a candidate right now at this moment.  Patient seen in the ED for acute right neck pain on 11/16/2020.  CT angiogram as well as CT of the cervical spine performed with no acute findings but notably one of her pulmonary nodules, left upper lobe, somewhat decreased in size.  Patient given prednisone pack.  Further details below.  Cycle #6 chemotherapy will be resumed on 1/5/2021.  Started back on original irinotecan.  PET scan examination obtained on 1/12/2021 after cycle #6 chemotherapy reported improved pulmonary nodule hypermetabolic activity.  Confirmed these are metastatic lesions.  Patient is evaluated 1/19/2020, will continue cycle #7 FOLFIRI chemotherapy.  However Avastin will be on hold see next section.   Patient was reevaluated on 2/2/2021, will continue chemotherapy cycle #8 FOLFIRI.  Avastin / biosimilar will be added.    [facility] mid February 2021, and had recommended palliative chemotherapy without surgical intervention of metastatic lung lesions.   On 3/2/2021, patient will proceed with cycle #10 FOLFIRI plus bevacizumab.  After 12  cycles, plan to start maintenance treatment.  3/16/2021 cycle 11.  Plus bevacizumab.  3/30/2021 cycle 12 FOLFIRI plus bevacizumab.  Having issues with worsening nausea despite premeds with dexamethasone, Aloxi, Emend, and Zofran at home.  Patient is requesting a dose of Phenergan, which is helped her in the past.  We will give this to her with treatment today.  PET scan examination on 4/6/2021 reported no evidence of hypermetabolic metastatic lesion.  Discussed with the patient on 4/13/2021, we reviewed the PET scan results.  We recommended to switch to maintenance chemotherapy without irinotecan.  We will continue 5-FU and Avastin every 2 weeks.  We discussed possibility of switching to oral Xeloda treatment.  Discussed with the patient for side effects more so with hand-foot syndrome.  Patient is agreeable.   Xeloda 2000 mg twice daily 7 days on, 7 days off along with Avastin initiated 4/27/2021.  After only 5 doses of Xeloda significant hand-foot syndrome, Xeloda held. Patient requesting to be transitioned back to infusional 5-FU, as she felt that she tolerated infusional 5-FU without any problem.  The patient will receive 5-FU leucovorin and Avastin every 2 weeks.  Xeloda discontinued.  5/25/2021 continued cycle #4 maintenance 5-FU, leucovorin, Avastin tolerating this much better so far, we will continue this. Recommended to have 12 cycles of maintenance chemotherapy, then obtain PET scan for reevaluation.  We could discuss further treatment plan at that time.  9/14/2021 patient due for cycle 12 of additional maintenance 5-FU/leucovorin plus Avastin.  She continues to tolerate treatment well overall.  She is anxious in the office today with her Mediport not getting blood at first and also with upcoming scans being heavy on her mind.  She was instructed to take one of her Klonopin pills while here to help calm her mind.  Heart rate did improve from 140s down to 112.  Proceed with treatment today as  scheduled.  PET scan examination on 9/24/2021 reported further shrinking of the right upper lobe tiny pulmonary nodule.  No other new lesions.  Discussed with patient on 9/24/2021 about further shrinking of the right upper lobe pulmonary nodule and no evidence for other new lesions. We recommended to have chemo break for 3 months with repeated CT scan for reevaluation.  Recent CT scan for chest 12/14/2021 and abdomen CT from 11/29/2021 reported no evidence for active disease. The right upper lobe pulmonary nodule, left lower lobe pulmonary nodule minimal about 4 mm and stable. I discussed with patient today on 12/21/2021, recommended to give her another 3 months chemotherapy holiday and obtain CT scan afterwards for reevaluation. After discussion, patient is agreeable.   CT scan examination for chest abdomen and pelvis obtained on 3/15/2022 reported a slight increase of the left upper lobe pulmonary nodule 5 mm, from previous 3 mm.  The other pulmonary nodules were stable in size.  There is also small left upper lobe groundglass changes.   I reviewed images studies with the patient today on 3/23/2022, compared to multiple previous images including CT chest CT and PET scan, suspected disease progression.  Discussed with the patient, and I recommended to resume maintenance chemotherapy with 5-FU plus leucovorin plus Avastin repeating every 2 weeks, and obtaining CT scan for reassessment in 3 months.  Patient is agreeable.  Patient resumed maintenance chemotherapy on 3/29/2022 with 5-FU leucovorin and Avastin.   4/26/2022, proceed with cycle #27 maintenance 5-FU, leucovorin, and Avastin.  Patient continues to tolerate treatment well.    On 5/10/2022, we will proceed ahead with cycle #28 maintenance chemotherapy.  Plan to have CT scan after cycle #30.  5/24/2022 cycle 29 maintenance chemotherapy.  Continue to tolerate well.  6/7/2022 cycle #30 maintenance chemotherapy, continuing to tolerate well.   CT scan for chest  abdomen pelvis with IV contrast obtained on 6/21/2022 reported sub-6 mm pulmonary nodules either stable or slightly smaller.  We reviewed the images with the patient together.  We discussed with the patient and recommended to hold chemotherapy for now with repeating CT scan in 3 months for reassessment.  CT scan of the chest abdomen pelvis 9/13/2022 reported stable condition, no evidence for recurrent or metastatic colon cancer.  On 1/10/2023 patient had a CT chest abdomen pelvis with reported no evidence for disease progression.  Laboratory study also showed normal CEA.   Patient had a CT scan for chest, abdomen, and pelvis obtained on 04/11/2023. This study reported very small pulmonary nodules, the right upper lobe nodule is stable to 6 mm, however, the left upper lobe and the left lower lobe nodule increased to 5 mm from previous 4 mm. I discussed with the patient today on 04/19/2023, I recommend to obtain another CT scan in 3 months for reassessment. The nodules are so small, they likely will not be picked up by PET scan examination.  CT scan examination of the chest, abdomen, and pelvis obtained on 7/7/2023 reported stable small pulmonary nodules. No evidence for disease progression or new lesions in chest, abdomen, and pelvis.  Discussed with patient today on 7/14/2023, however, patient is concerning for disease progression. I recommended to obtain Guardant Reveal for circulating tumor DNA study. If the study is positive, we will further obtain PET scan examination, otherwise, we will obtain CT scan for chest, abdomen, and pelvis in 3 months for reassessment.  The patient had CT scan for the chest, abdomen, and pelvis obtained on 10/27/2023, which reported worsening pulmonary nodules at bilateral upper lobes. Laboratory studies showed low normal CEA level and normal liver function panel. The Guardant Reveal study is still pending. I discussed this with the patient on 11/03/2023 and we shared the images, and I  recommended having a PET scan examination for further assessment. I will present her case at the multimodality lung conference. If those lesions are deemed to be metastatic lesions, I recommend having stereotactic radiotherapy. I discussed it with the patient, and she voiced understanding and was agreeable with that strategy.  I presented her case at the multimodality chest conference 11/16/2023.  The consensus was for patient to receive stereotactic radiation therapy for the right upper lobe lung lesion, and the bigger left upper lobe lesion, and monitor the smaller left upper lobe lesion with further images studies down the line. Also, the conference recommended Guardant Reveal study which we already ordered. I discussed with the patient today on 11/17/2023, we explained to the patient that the opinion of the conference and she is agreeable with this and prefers this strategy because of limited toxicity compared to long term commitment to starting chemotherapy again. I recommend to obtain a CT scan for the chest, abdomen, and pelvis with both IV and oral contrast for reassessment in 3 months for reassessment of metastatic lung lesions and thickness in the pelvis where the PET scan reported a low activity SUV 2.4 in the surgical bed.   The patient had steroid-induced radiation therapy for the right upper lobe and one of the left upper lobe lesions.  Her CT scan examination on 02/02/2024 reported shrinking nodules of the right upper lobe and one of the left upper lobe lesions, both from 9 mm down to 6 mm. There are 2 stable pulmonary nodules 7 mm. Nothing new.  02/09/2024, I discussed with the patient and recommended CT scan for the chest, abdomen, and pelvis in 3 months for reassessment. Guardant Reveal study was 0% ctDNA. We will also continue laboratory studies every 3 months for Guardant Reveal, together with routine labs, CBC, CMP, and CEA.  CT scan of the chest, abdomen, and pelvis conducted on 4/23/2024  revealed stable multiple pulmonary nodules, with no other new pulmonary nodules or evidence of disease recurrence or abnormal pelvis. The patient's liver function panel was normal, and low-level CEA level was also noted. The Guardant Reveal study was able to be obtained earlier due to regulatory rules, and we hugo obtain today the Guardant reveal study, be available within 10 to 14 days.   Given the patient's metastatic liver lesion, and lung lesions from colon cancer, careful monitoring is necessary. A chest CT scan with IV contrast will be arranged in 3 months for reevaluation. The study will be reviewed again in 3 months, which will include CBC, CMP, and CEA level.   Imaging study with chest CT scan on 08/02/2024 reported multiple bilateral pulmonary nodules that are relatively stable. However, the Guardant Reveal reported positive ctDNA indicating disease progression. A subsequent PET scan examination on 08/14/2024 reported newly developed hypermetabolic pulmonary nodules. The highest SUV is 3.7, corresponding to an 8 mm new right upper lobe nodule, and there are several other hypometabolic nodules in the lungs.   I discussed with the patient today on 8/16/2024, reviewed images studies and the laboratory results, and recommended to resume chemotherapy with 5-FU and bevacizumab. The last chemotherapy was 2 years ago. The patient voiced understanding and agreed to resume chemotherapy.       2.   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  Low-dose Lovenox discontinued and initiated Xarelto 20 mg daily.  Patient with apparent understanding chronic  thrombosis of left innominate vein likely associated with MediPort, was evident per vascular surgery from CT scan in September 2020.  Patient held Xarelto for 2 days prior to MediPort removal from the left chest wall and placement of new port in the right chest wall on 12/18/2020.  She resumed Xarelto that evening.  Progressive swelling in the neck, face, upper extremities prompting hospitalization with CT scan showing thrombosis in the right subclavian vein and SVC.  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  Although she had been off of Xarelto for a few days, clearly Xarelto was not prohibiting progression of her thrombosis after she resumed treatment and there was some evidence to suggest thrombosis had been present at least in the innominate vein on prior scan in September but now appears chronic.  Current acute thrombosis involving SVC and right subclavian.  Patient was admitted and placed on heparin drip  Patient was evaluated by vascular surgery and intervention was not recommended for thrombolysis/thrombectomy.  On 12/22/2020, the patient developed worsening shortness of breath, increasing upper extremity edema consistent with worsening SVC syndrome.  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  Symptoms improved and patient was discharged 12/24/2020 on Lovenox 100 mg every 12 hours.    Returns 12/29/2020 for evaluation continuing Lovenox, overall tolerating it well.  No bleeding issues.  Improved edema 1/5/2021.  Will continue Lovenox weight-based every 12 hours.  On 1/19/2021 and 2/2/2021, patient reports improved facial swelling.  She however does have being bruise on her abdomen wall where she does Lovenox injection.  However she does not have a spontaneous epistaxis, gum bleeding or other bleeding.   On  2/2/2021, we discussed that side effects of Avastin/biosimilar related to thrombosis.  Since this patient already has been on Lovenox, I think the benefits from treatment for her cancer will outweigh that risk of thrombosis, will proceed ahead with Avastin.  I cautioned patient to watch out for signs of worsening thrombosis patient voiced understanding.   On 3/16/2021, no evidence of worsening DVT, continue Lovenox.  5/11/2021 Lovenox dosing will be adjusted due to patient's weight loss.  We discussed she needs 1 mg/kg.  Her syringes are 100 mg syringes she will do 0.9 mL subcu every 12 hours.  8/3/2021 Patient continues to have bruising on abdomen from lovenox injections.  Will need to monitor weight and adjust dosing in future if further weight loss.   Stable condition on 9/28/2021.  Continue Lovenox anticoagulation.    Patient reports no chest pain no dyspnea no leg edema. However she had bruising and also most recently abnormal small abscess for which she actually went to ER on 11/29/2021, had I&D. The wound is healing. No further drainage. Denies fever sweating or chills. After discussion, she will continue Lovenox injection for now.   On 3/23/2022, patient reports good tolerance of Lovenox.  Nevertheless she still has small quantity of pus in the left lower abdomen abscess, she squeezes it every day to prevent it became worse.  She reports no fever sweating chills.  Recommended to start Augmentin 875 mg twice a day for 7 days.  She will continue Lovenox indefinitely.  On 4/12/2022, patient reports resolution of the small abscess at left lower abdominal wall.   On 5/10/2022, patient continues on Lovenox indefinitely.  No easy bleeding or bruising.  6/23/2022 continuing on Lovenox 1 mg/kg at a dose of 100 mg (patient weight is 96.6 kg today) every 12 hours.  On 1/17/2023, patient reports good tolerance, Lovenox will be continued.    Patient had a venous Doppler study on 02/05/2023, which reported acute left  upper extremity DVT in the subclavian vein, axillary and the brachial vein, and also superficial thrombophlebitis in the basilic upper arm.   On 04/19/2023, patient reports that she was not compliant with anticoagulation at the time of recurrent DVT. She now is fully compliant and is using Lovenox.  On 2/09/2024, the patient reports ongoing anticoagulation with no bleeding or bruising.    On 8/16/2024, the patient reports tolerating anticoagulation without any bleeding or bruising, which will be continued.    3.  This patient also has strong family history for malignancy   The patient had appointment with genetic counseling on September 3, 2019.  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    4.  Mild anemia.   She also has history of iron deficiency.  Iron studies reveal iron saturation of 10%.  She was started on oral iron but was unable to tolerate due to GI side effects.   Status post Injectafer 2/5/2020 and 2/12/2020   Improved hemoglobin 13.5 on 1/5/2021.  3/16/2020 when hemoglobin now up to 16.2, hematocrit 47.6.  Patient admits that she has started smoking again, and I have encouraged her to quit.  She denies any snoring or sleep apnea diagnosis.  Patient is not taking oral iron.  We will closely monitor.  3/30/2020 hemoglobin 15.7, HCT 47.5.  Patient states that she has cut back significantly on her smoking, although not quit yet.  I have encouraged her to continue working on this.  We will continue to closely monitor.  Hemoglobin 14.6 on 6/8/2021 at the meantime he has worsening macrocytosis .6.    Laboratory studies 6/22/2021 reported excellent folate > 20 ng/mL, however low normal B12 level 357 pg/mL.    On 7/6/2021, normal hemoglobin 15.8, .9 and HCT 47.2%.  Patient was asked to start oral vitamin B12 at 1000 mcg daily.   9/14/2021 hemoglobin 17.0, hematocrit 52.1.  Monitor.  On 9/28/2021 hemoglobin 16.1 hematocrit 48.5%, continue to monitor.   Lab study on 12/14/2021 reported excellent  ferritin 296 and iron saturation 25% with free iron 104 TIBC 424. Hemoglobin was 15.2 with .2.   Normal hemoglobin 14.6 on 3/15/2022.  Iron study reported normal ferritin 129.5 ng/mL however slightly low iron saturation 11%.  Continue to monitor for now.  4/26/2022, hemoglobin 14.8.  6/7/2022 hemoglobin 15.5.  On 9/20/2022 Hb 15.7, .1.  On 1/10/2023 normal hemoglobin 14.7 MCV 95.0.  Laboratory study on 04/11/2023 reported normal hemoglobin 13.9.  Lab study on 7/7/2023 reported normal hemoglobin 14.9.  A laboratory study on 10/27/2023 reported normal hemoglobin at 15.1.  On 02/09/2024, the patient has normal hemoglobin at 15.0.   On 4/23/2024 normal hemoglobin level of 14.0.  8/2/2024 normal hemoglobin 13.8.      5.  Peripheral neuropathy secondary to chemotherapy.   This is mild involving bilateral hands and feet as reported on 9/16/2020.   Will avoid chemotherapy with oxaliplatin.  Stable mild neuropathy as of 11/10/2020  Patient reports worsening peripheral neuropathy and cycle #5 chemotherapy on 12/8/2020 with and without irinotecan.   On 1/5/2021, patient reports improvement of peripheral neuropathy, will add irinotecan back to chemotherapy.  Continue to monitor and adjust medication.  On 3/2/2021, patient reports no worsening of peripheral neuropathy after restarting irinotecan.   This remains stable, may be slightly better as reported on 3/23/2022..   No worsening since resuming chemotherapy on 3/29/2022.  On 6/23/2022 peripheral neuropathy remains stable.  No worsening peripheral neuropathy today.     6.  Depression.  Patient seen by JULIET Davenport on 11/9/2020.  She was started on mirtazapine.  Lexapro was discontinued.  This has definitely improved her mood with the patient feeling overall much better.  She is sleeping better.  Appetite is improved with her actually eating more than she wants to.    Condition is stable.  She plans to continue follow-up with supportive oncology.    7.  Malfunction of the portal catheter  Patient reports there was no blood drawn from the portal catheter. CT scan of the chest on 7/7/2023 reported occlusion of the SVC around to the Port-A-Cath tubing. I recommend trying activase to see if it helps.  Otherwise, we may have to remove the catheter. Clinically, patient has no signs of SVC syndrome.  On 11/03/2023, the patient reports that her Port-A-Cath was able to be used for a CT scan for the injection of intravenous dye; however, there was no blood return, so we needed to draw from her arm for the blood test. We will continue to keep Port-A-Cath for now.  On 02/09/2024, the patient reports that the port was able to be flushed. However, no blood was returned. We will continue to flush every 6 weeks.      PLAN:    Chemotherapy will be resumed next week with 5-FU leucovorin plus bevacizumab per protocol. Chemotherapy will be administered every 2 weeks.   She will see APRN in 3 weeks with laboratory studies prior to cycle 2 chemotherapy.   Continue anticoagulation with Lovenox subcu injection every 12 hours.  I will see her in 5 weeks prior to her cycle 3 chemotherapy.  Labs per protocol.  A liquid biopsy/Guardant reveal study will be conducted to monitor her response together with intermittent imaging studies.           Dong Nguyen MD PhD         CC:  Deepika Vela III NP-C   MD Alverto Bowers MD

## 2024-08-19 PROBLEM — Z79.899 ENCOUNTER FOR LONG-TERM (CURRENT) USE OF HIGH-RISK MEDICATION: Status: ACTIVE | Noted: 2024-08-19

## 2024-08-21 ENCOUNTER — INFUSION (OUTPATIENT)
Dept: ONCOLOGY | Facility: HOSPITAL | Age: 44
End: 2024-08-21
Payer: COMMERCIAL

## 2024-08-21 VITALS
SYSTOLIC BLOOD PRESSURE: 131 MMHG | BODY MASS INDEX: 31.96 KG/M2 | OXYGEN SATURATION: 93 % | TEMPERATURE: 97.7 F | HEART RATE: 84 BPM | RESPIRATION RATE: 16 BRPM | WEIGHT: 198 LBS | DIASTOLIC BLOOD PRESSURE: 66 MMHG

## 2024-08-21 DIAGNOSIS — Z79.899 ENCOUNTER FOR LONG-TERM (CURRENT) USE OF HIGH-RISK MEDICATION: ICD-10-CM

## 2024-08-21 DIAGNOSIS — C78.00 MALIGNANT NEOPLASM METASTATIC TO LUNG, UNSPECIFIED LATERALITY: Primary | ICD-10-CM

## 2024-08-21 DIAGNOSIS — C20 ADENOCARCINOMA OF RECTUM: ICD-10-CM

## 2024-08-21 LAB
ALBUMIN SERPL-MCNC: 3.7 G/DL (ref 3.5–5.2)
ALBUMIN/GLOB SERPL: 1.3 G/DL
ALP SERPL-CCNC: 82 U/L (ref 39–117)
ALT SERPL W P-5'-P-CCNC: 9 U/L (ref 1–33)
ANION GAP SERPL CALCULATED.3IONS-SCNC: 11.3 MMOL/L (ref 5–15)
AST SERPL-CCNC: 16 U/L (ref 1–32)
B-HCG UR QL: NEGATIVE
BASOPHILS # BLD AUTO: 0.03 10*3/MM3 (ref 0–0.2)
BASOPHILS NFR BLD AUTO: 0.4 % (ref 0–1.5)
BILIRUB SERPL-MCNC: 0.2 MG/DL (ref 0–1.2)
BILIRUB UR QL STRIP: ABNORMAL
BUN SERPL-MCNC: 10 MG/DL (ref 6–20)
BUN/CREAT SERPL: 15.6 (ref 7–25)
CALCIUM SPEC-SCNC: 8.7 MG/DL (ref 8.6–10.5)
CHLORIDE SERPL-SCNC: 107 MMOL/L (ref 98–107)
CLARITY UR: CLEAR
CO2 SERPL-SCNC: 22.7 MMOL/L (ref 22–29)
COLOR UR: ABNORMAL
CREAT SERPL-MCNC: 0.64 MG/DL (ref 0.57–1)
DEPRECATED RDW RBC AUTO: 47.8 FL (ref 37–54)
EGFRCR SERPLBLD CKD-EPI 2021: 111.2 ML/MIN/1.73
EOSINOPHIL # BLD AUTO: 0.22 10*3/MM3 (ref 0–0.4)
EOSINOPHIL NFR BLD AUTO: 3.2 % (ref 0.3–6.2)
ERYTHROCYTE [DISTWIDTH] IN BLOOD BY AUTOMATED COUNT: 13.7 % (ref 12.3–15.4)
GLOBULIN UR ELPH-MCNC: 2.9 GM/DL
GLUCOSE SERPL-MCNC: 101 MG/DL (ref 65–99)
GLUCOSE UR STRIP-MCNC: NEGATIVE MG/DL
HCT VFR BLD AUTO: 43.2 % (ref 34–46.6)
HGB BLD-MCNC: 14.2 G/DL (ref 12–15.9)
HGB UR QL STRIP.AUTO: ABNORMAL
IMM GRANULOCYTES # BLD AUTO: 0.01 10*3/MM3 (ref 0–0.05)
IMM GRANULOCYTES NFR BLD AUTO: 0.1 % (ref 0–0.5)
KETONES UR QL STRIP: NEGATIVE
LEUKOCYTE ESTERASE UR QL STRIP.AUTO: ABNORMAL
LYMPHOCYTES # BLD AUTO: 1.47 10*3/MM3 (ref 0.7–3.1)
LYMPHOCYTES NFR BLD AUTO: 21.6 % (ref 19.6–45.3)
MCH RBC QN AUTO: 31.1 PG (ref 26.6–33)
MCHC RBC AUTO-ENTMCNC: 32.9 G/DL (ref 31.5–35.7)
MCV RBC AUTO: 94.7 FL (ref 79–97)
MONOCYTES # BLD AUTO: 0.51 10*3/MM3 (ref 0.1–0.9)
MONOCYTES NFR BLD AUTO: 7.5 % (ref 5–12)
NEUTROPHILS NFR BLD AUTO: 4.55 10*3/MM3 (ref 1.7–7)
NEUTROPHILS NFR BLD AUTO: 67.2 % (ref 42.7–76)
NITRITE UR QL STRIP: NEGATIVE
NRBC BLD AUTO-RTO: 0 /100 WBC (ref 0–0.2)
PH UR STRIP.AUTO: 6 [PH] (ref 4.5–8)
PLATELET # BLD AUTO: 219 10*3/MM3 (ref 140–450)
PMV BLD AUTO: 9 FL (ref 6–12)
POTASSIUM SERPL-SCNC: 4 MMOL/L (ref 3.5–5.2)
PROT SERPL-MCNC: 6.6 G/DL (ref 6–8.5)
PROT UR QL STRIP: ABNORMAL
RBC # BLD AUTO: 4.56 10*6/MM3 (ref 3.77–5.28)
SODIUM SERPL-SCNC: 141 MMOL/L (ref 136–145)
SP GR UR STRIP: >=1.03 (ref 1–1.03)
UROBILINOGEN UR QL STRIP: ABNORMAL
WBC NRBC COR # BLD AUTO: 6.79 10*3/MM3 (ref 3.4–10.8)

## 2024-08-21 PROCEDURE — 25010000002 FOSAPREPITANT PER 1 MG: Performed by: INTERNAL MEDICINE

## 2024-08-21 PROCEDURE — 87086 URINE CULTURE/COLONY COUNT: CPT | Performed by: INTERNAL MEDICINE

## 2024-08-21 PROCEDURE — 25010000002 FLUOROURACIL PER 500 MG: Performed by: INTERNAL MEDICINE

## 2024-08-21 PROCEDURE — 80053 COMPREHEN METABOLIC PANEL: CPT

## 2024-08-21 PROCEDURE — 96367 TX/PROPH/DG ADDL SEQ IV INF: CPT

## 2024-08-21 PROCEDURE — 96375 TX/PRO/DX INJ NEW DRUG ADDON: CPT

## 2024-08-21 PROCEDURE — 25810000003 SODIUM CHLORIDE 0.9 % SOLUTION 250 ML FLEX CONT: Performed by: INTERNAL MEDICINE

## 2024-08-21 PROCEDURE — 25010000002 BEVACIZUMAB PER 10 MG: Performed by: INTERNAL MEDICINE

## 2024-08-21 PROCEDURE — 25010000002 PALONOSETRON PER 25 MCG: Performed by: INTERNAL MEDICINE

## 2024-08-21 PROCEDURE — 36415 COLL VENOUS BLD VENIPUNCTURE: CPT

## 2024-08-21 PROCEDURE — 96413 CHEMO IV INFUSION 1 HR: CPT

## 2024-08-21 PROCEDURE — 85025 COMPLETE CBC W/AUTO DIFF WBC: CPT

## 2024-08-21 PROCEDURE — 25810000003 SODIUM CHLORIDE 0.9 % SOLUTION: Performed by: INTERNAL MEDICINE

## 2024-08-21 PROCEDURE — 25010000002 BEVACIZUMAB 100 MG/4ML SOLUTION 4 ML VIAL: Performed by: INTERNAL MEDICINE

## 2024-08-21 PROCEDURE — 25010000002 LEUCOVORIN CALCIUM PER 50 MG: Performed by: INTERNAL MEDICINE

## 2024-08-21 PROCEDURE — 25010000002 DEXAMETHASONE SODIUM PHOSPHATE 100 MG/10ML SOLUTION: Performed by: INTERNAL MEDICINE

## 2024-08-21 PROCEDURE — 81025 URINE PREGNANCY TEST: CPT

## 2024-08-21 PROCEDURE — G0498 CHEMO EXTEND IV INFUS W/PUMP: HCPCS

## 2024-08-21 PROCEDURE — 81003 URINALYSIS AUTO W/O SCOPE: CPT

## 2024-08-21 RX ORDER — PALONOSETRON 0.05 MG/ML
0.25 INJECTION, SOLUTION INTRAVENOUS ONCE
Status: COMPLETED | OUTPATIENT
Start: 2024-08-21 | End: 2024-08-21

## 2024-08-21 RX ORDER — PALONOSETRON 0.05 MG/ML
0.25 INJECTION, SOLUTION INTRAVENOUS ONCE
Status: CANCELLED | OUTPATIENT
Start: 2024-08-21

## 2024-08-21 RX ORDER — SODIUM CHLORIDE 9 MG/ML
20 INJECTION, SOLUTION INTRAVENOUS ONCE
Status: CANCELLED | OUTPATIENT
Start: 2024-08-21

## 2024-08-21 RX ORDER — SODIUM CHLORIDE 9 MG/ML
20 INJECTION, SOLUTION INTRAVENOUS ONCE
Status: COMPLETED | OUTPATIENT
Start: 2024-08-21 | End: 2024-08-21

## 2024-08-21 RX ADMIN — LEUCOVORIN CALCIUM 800 MG: 350 INJECTION, POWDER, LYOPHILIZED, FOR SUSPENSION INTRAMUSCULAR; INTRAVENOUS at 10:54

## 2024-08-21 RX ADMIN — FLUOROURACIL 4800 MG: 50 INJECTION, SOLUTION INTRAVENOUS at 11:30

## 2024-08-21 RX ADMIN — PALONOSETRON HYDROCHLORIDE 0.25 MG: 0.25 INJECTION INTRAVENOUS at 09:31

## 2024-08-21 RX ADMIN — BEVACIZUMAB 450 MG: 400 INJECTION, SOLUTION INTRAVENOUS at 10:24

## 2024-08-21 RX ADMIN — DEXAMETHASONE SODIUM PHOSPHATE 12 MG: 10 INJECTION, SOLUTION INTRAMUSCULAR; INTRAVENOUS at 09:33

## 2024-08-21 RX ADMIN — FOSAPREPITANT 100 ML: 150 INJECTION, POWDER, LYOPHILIZED, FOR SOLUTION INTRAVENOUS at 09:50

## 2024-08-21 RX ADMIN — SODIUM CHLORIDE 20 ML/HR: 9 INJECTION, SOLUTION INTRAVENOUS at 09:30

## 2024-08-21 NOTE — NURSING NOTE
Pt does not get blood return from her port. She has had a dye study showing port is patent and pt reports Activase hasn't worked in the past. Port flushes easily. Reviewed with emily Espinosa to use port.

## 2024-08-22 LAB — BACTERIA SPEC AEROBE CULT: NO GROWTH

## 2024-08-23 ENCOUNTER — INFUSION (OUTPATIENT)
Dept: ONCOLOGY | Facility: HOSPITAL | Age: 44
End: 2024-08-23
Payer: COMMERCIAL

## 2024-08-23 DIAGNOSIS — C78.00 MALIGNANT NEOPLASM METASTATIC TO LUNG, UNSPECIFIED LATERALITY: ICD-10-CM

## 2024-08-23 DIAGNOSIS — Z45.2 ENCOUNTER FOR FITTING AND ADJUSTMENT OF VASCULAR CATHETER: ICD-10-CM

## 2024-08-23 DIAGNOSIS — Z79.899 ENCOUNTER FOR LONG-TERM (CURRENT) USE OF HIGH-RISK MEDICATION: Primary | ICD-10-CM

## 2024-08-23 DIAGNOSIS — C20 ADENOCARCINOMA OF RECTUM: ICD-10-CM

## 2024-08-23 PROCEDURE — 25010000002 HEPARIN LOCK FLUSH PER 10 UNITS: Performed by: INTERNAL MEDICINE

## 2024-08-23 RX ORDER — SODIUM CHLORIDE 0.9 % (FLUSH) 0.9 %
10 SYRINGE (ML) INJECTION AS NEEDED
OUTPATIENT
Start: 2024-08-23

## 2024-08-23 RX ORDER — HEPARIN SODIUM (PORCINE) LOCK FLUSH IV SOLN 100 UNIT/ML 100 UNIT/ML
500 SOLUTION INTRAVENOUS AS NEEDED
OUTPATIENT
Start: 2024-08-23

## 2024-08-23 RX ORDER — HEPARIN SODIUM (PORCINE) LOCK FLUSH IV SOLN 100 UNIT/ML 100 UNIT/ML
500 SOLUTION INTRAVENOUS AS NEEDED
Status: DISCONTINUED | OUTPATIENT
Start: 2024-08-23 | End: 2024-08-23 | Stop reason: HOSPADM

## 2024-08-23 RX ORDER — SODIUM CHLORIDE 0.9 % (FLUSH) 0.9 %
10 SYRINGE (ML) INJECTION AS NEEDED
Status: DISCONTINUED | OUTPATIENT
Start: 2024-08-23 | End: 2024-08-23 | Stop reason: HOSPADM

## 2024-08-23 RX ADMIN — Medication 500 UNITS: at 09:37

## 2024-08-23 RX ADMIN — Medication 10 ML: at 09:35

## 2024-08-28 ENCOUNTER — LAB (OUTPATIENT)
Dept: LAB | Facility: HOSPITAL | Age: 44
End: 2024-08-28
Payer: COMMERCIAL

## 2024-08-28 ENCOUNTER — OFFICE VISIT (OUTPATIENT)
Dept: ONCOLOGY | Facility: CLINIC | Age: 44
End: 2024-08-28
Payer: COMMERCIAL

## 2024-08-28 ENCOUNTER — INFUSION (OUTPATIENT)
Dept: ONCOLOGY | Facility: HOSPITAL | Age: 44
End: 2024-08-28
Payer: COMMERCIAL

## 2024-08-28 ENCOUNTER — TELEPHONE (OUTPATIENT)
Dept: ONCOLOGY | Facility: CLINIC | Age: 44
End: 2024-08-28
Payer: COMMERCIAL

## 2024-08-28 VITALS
HEIGHT: 66 IN | WEIGHT: 191.9 LBS | BODY MASS INDEX: 30.84 KG/M2 | OXYGEN SATURATION: 97 % | SYSTOLIC BLOOD PRESSURE: 126 MMHG | TEMPERATURE: 97.8 F | RESPIRATION RATE: 18 BRPM | HEART RATE: 116 BPM | DIASTOLIC BLOOD PRESSURE: 70 MMHG

## 2024-08-28 DIAGNOSIS — C20 ADENOCARCINOMA OF RECTUM: Primary | ICD-10-CM

## 2024-08-28 DIAGNOSIS — C78.00 MALIGNANT NEOPLASM METASTATIC TO LUNG, UNSPECIFIED LATERALITY: ICD-10-CM

## 2024-08-28 DIAGNOSIS — R19.7 DIARRHEA OF PRESUMED INFECTIOUS ORIGIN: ICD-10-CM

## 2024-08-28 DIAGNOSIS — R19.7 INTRACTABLE DIARRHEA: ICD-10-CM

## 2024-08-28 DIAGNOSIS — C20 ADENOCARCINOMA OF RECTUM: ICD-10-CM

## 2024-08-28 DIAGNOSIS — D68.51 HETEROZYGOUS FACTOR V LEIDEN MUTATION: ICD-10-CM

## 2024-08-28 DIAGNOSIS — Z45.2 ENCOUNTER FOR FITTING AND ADJUSTMENT OF VASCULAR CATHETER: ICD-10-CM

## 2024-08-28 LAB
ADV 40+41 DNA STL QL NAA+NON-PROBE: NOT DETECTED
ALBUMIN SERPL-MCNC: 4.1 G/DL (ref 3.5–5.2)
ALBUMIN/GLOB SERPL: 1.3 G/DL
ALP SERPL-CCNC: 92 U/L (ref 39–117)
ALT SERPL W P-5'-P-CCNC: 22 U/L (ref 1–33)
ANION GAP SERPL CALCULATED.3IONS-SCNC: 14.1 MMOL/L (ref 5–15)
AST SERPL-CCNC: 18 U/L (ref 1–32)
ASTRO TYP 1-8 RNA STL QL NAA+NON-PROBE: NOT DETECTED
BASOPHILS # BLD AUTO: 0.01 10*3/MM3 (ref 0–0.2)
BASOPHILS NFR BLD AUTO: 0.2 % (ref 0–1.5)
BILIRUB SERPL-MCNC: 0.4 MG/DL (ref 0–1.2)
BUN SERPL-MCNC: 10 MG/DL (ref 6–20)
BUN/CREAT SERPL: 14.9 (ref 7–25)
C CAYETANENSIS DNA STL QL NAA+NON-PROBE: NOT DETECTED
C COLI+JEJ+UPSA DNA STL QL NAA+NON-PROBE: NOT DETECTED
C DIFF TOX GENS STL QL NAA+PROBE: NEGATIVE
CALCIUM SPEC-SCNC: 9.1 MG/DL (ref 8.6–10.5)
CHLORIDE SERPL-SCNC: 102 MMOL/L (ref 98–107)
CO2 SERPL-SCNC: 21.9 MMOL/L (ref 22–29)
CREAT SERPL-MCNC: 0.67 MG/DL (ref 0.57–1)
CRYPTOSP DNA STL QL NAA+NON-PROBE: NOT DETECTED
DEPRECATED RDW RBC AUTO: 44.5 FL (ref 37–54)
E HISTOLYT DNA STL QL NAA+NON-PROBE: NOT DETECTED
EAEC PAA PLAS AGGR+AATA ST NAA+NON-PRB: NOT DETECTED
EC STX1+STX2 GENES STL QL NAA+NON-PROBE: NOT DETECTED
EGFRCR SERPLBLD CKD-EPI 2021: 110 ML/MIN/1.73
EOSINOPHIL # BLD AUTO: 0.13 10*3/MM3 (ref 0–0.4)
EOSINOPHIL NFR BLD AUTO: 2 % (ref 0.3–6.2)
EPEC EAE GENE STL QL NAA+NON-PROBE: NOT DETECTED
ERYTHROCYTE [DISTWIDTH] IN BLOOD BY AUTOMATED COUNT: 13.2 % (ref 12.3–15.4)
ETEC LTA+ST1A+ST1B TOX ST NAA+NON-PROBE: NOT DETECTED
G LAMBLIA DNA STL QL NAA+NON-PROBE: NOT DETECTED
GLOBULIN UR ELPH-MCNC: 3.2 GM/DL
GLUCOSE SERPL-MCNC: 108 MG/DL (ref 65–99)
HCT VFR BLD AUTO: 45.3 % (ref 34–46.6)
HGB BLD-MCNC: 15.5 G/DL (ref 12–15.9)
IMM GRANULOCYTES # BLD AUTO: 0.02 10*3/MM3 (ref 0–0.05)
IMM GRANULOCYTES NFR BLD AUTO: 0.3 % (ref 0–0.5)
LYMPHOCYTES # BLD AUTO: 1.51 10*3/MM3 (ref 0.7–3.1)
LYMPHOCYTES NFR BLD AUTO: 23.7 % (ref 19.6–45.3)
MAGNESIUM SERPL-MCNC: 2.1 MG/DL (ref 1.6–2.6)
MCH RBC QN AUTO: 31.5 PG (ref 26.6–33)
MCHC RBC AUTO-ENTMCNC: 34.2 G/DL (ref 31.5–35.7)
MCV RBC AUTO: 92.1 FL (ref 79–97)
MONOCYTES # BLD AUTO: 0.43 10*3/MM3 (ref 0.1–0.9)
MONOCYTES NFR BLD AUTO: 6.7 % (ref 5–12)
NEUTROPHILS NFR BLD AUTO: 4.28 10*3/MM3 (ref 1.7–7)
NEUTROPHILS NFR BLD AUTO: 67.1 % (ref 42.7–76)
NOROVIRUS GI+II RNA STL QL NAA+NON-PROBE: NOT DETECTED
NRBC BLD AUTO-RTO: 0 /100 WBC (ref 0–0.2)
P SHIGELLOIDES DNA STL QL NAA+NON-PROBE: NOT DETECTED
PLATELET # BLD AUTO: 199 10*3/MM3 (ref 140–450)
PMV BLD AUTO: 9.2 FL (ref 6–12)
POTASSIUM SERPL-SCNC: 3.6 MMOL/L (ref 3.5–5.2)
PROT SERPL-MCNC: 7.3 G/DL (ref 6–8.5)
RBC # BLD AUTO: 4.92 10*6/MM3 (ref 3.77–5.28)
RVA RNA STL QL NAA+NON-PROBE: NOT DETECTED
S ENT+BONG DNA STL QL NAA+NON-PROBE: NOT DETECTED
SAPO I+II+IV+V RNA STL QL NAA+NON-PROBE: NOT DETECTED
SHIGELLA SP+EIEC IPAH ST NAA+NON-PROBE: NOT DETECTED
SODIUM SERPL-SCNC: 138 MMOL/L (ref 136–145)
V CHOL+PARA+VUL DNA STL QL NAA+NON-PROBE: NOT DETECTED
V CHOLERAE DNA STL QL NAA+NON-PROBE: NOT DETECTED
WBC NRBC COR # BLD AUTO: 6.38 10*3/MM3 (ref 3.4–10.8)
Y ENTEROCOL DNA STL QL NAA+NON-PROBE: NOT DETECTED

## 2024-08-28 PROCEDURE — 87507 IADNA-DNA/RNA PROBE TQ 12-25: CPT | Performed by: NURSE PRACTITIONER

## 2024-08-28 PROCEDURE — 85025 COMPLETE CBC W/AUTO DIFF WBC: CPT

## 2024-08-28 PROCEDURE — 25810000003 SODIUM CHLORIDE 0.9 % SOLUTION: Performed by: NURSE PRACTITIONER

## 2024-08-28 PROCEDURE — 87493 C DIFF AMPLIFIED PROBE: CPT | Performed by: NURSE PRACTITIONER

## 2024-08-28 PROCEDURE — 96360 HYDRATION IV INFUSION INIT: CPT

## 2024-08-28 PROCEDURE — 36415 COLL VENOUS BLD VENIPUNCTURE: CPT

## 2024-08-28 PROCEDURE — 83735 ASSAY OF MAGNESIUM: CPT

## 2024-08-28 PROCEDURE — 80053 COMPREHEN METABOLIC PANEL: CPT

## 2024-08-28 PROCEDURE — 25010000002 HEPARIN LOCK FLUSH PER 10 UNITS: Performed by: INTERNAL MEDICINE

## 2024-08-28 RX ORDER — SODIUM CHLORIDE 9 MG/ML
1000 INJECTION, SOLUTION INTRAVENOUS ONCE
Status: COMPLETED | OUTPATIENT
Start: 2024-08-28 | End: 2024-08-28

## 2024-08-28 RX ORDER — SODIUM CHLORIDE 0.9 % (FLUSH) 0.9 %
10 SYRINGE (ML) INJECTION AS NEEDED
Status: CANCELLED | OUTPATIENT
Start: 2024-08-28

## 2024-08-28 RX ORDER — SODIUM CHLORIDE 0.9 % (FLUSH) 0.9 %
10 SYRINGE (ML) INJECTION AS NEEDED
Status: DISCONTINUED | OUTPATIENT
Start: 2024-08-28 | End: 2024-08-28 | Stop reason: HOSPADM

## 2024-08-28 RX ORDER — PANTOPRAZOLE SODIUM 40 MG/1
40 TABLET, DELAYED RELEASE ORAL DAILY
Qty: 30 TABLET | Refills: 1 | Status: SHIPPED | OUTPATIENT
Start: 2024-08-28

## 2024-08-28 RX ORDER — HEPARIN SODIUM (PORCINE) LOCK FLUSH IV SOLN 100 UNIT/ML 100 UNIT/ML
500 SOLUTION INTRAVENOUS AS NEEDED
Status: DISCONTINUED | OUTPATIENT
Start: 2024-08-28 | End: 2024-08-28 | Stop reason: HOSPADM

## 2024-08-28 RX ORDER — HEPARIN SODIUM (PORCINE) LOCK FLUSH IV SOLN 100 UNIT/ML 100 UNIT/ML
500 SOLUTION INTRAVENOUS AS NEEDED
Status: CANCELLED | OUTPATIENT
Start: 2024-08-28

## 2024-08-28 RX ORDER — METRONIDAZOLE 500 MG/1
500 TABLET ORAL 3 TIMES DAILY
Qty: 30 TABLET | Refills: 0 | Status: SHIPPED | OUTPATIENT
Start: 2024-08-28

## 2024-08-28 RX ADMIN — Medication 10 ML: at 16:16

## 2024-08-28 RX ADMIN — SODIUM CHLORIDE 1000 ML/HR: 9 INJECTION, SOLUTION INTRAVENOUS at 14:48

## 2024-08-28 RX ADMIN — Medication 500 UNITS: at 16:16

## 2024-08-28 NOTE — PROGRESS NOTES
REASON FOR FOLLOW UP:     Rectal cancer, rectal ultrasound examination in July 2019 reported T3N0 disease without lymphadenopathy. She does have small pulmonary nodule 6-7 mm and 2 mm with indeterminate feature. There is no solid evidence of metastatic disease otherwise. Patient has stage IIA (T3N0M0) disease.  The patient is heterozygous factor V Leiden, was on prophylactic anticoagulation with Lovenox 40 mg daily given her increased risk of thrombosis with MediPort and GI malignancy.   PET scan on 8/8/2019 reported an 8 mm hypermetabolic right upper lobe pulmonary nodule, which is suspicious for metastatic as well.    Patient had a PowerPort placement on the left upper chest by Dr. Joseph on 8/9/2019.  Patient was started on concurrent infusional 5-FU chemoradiation therapy on 8/12/2019, with planned complete surgical resection and further adjuvant chemotherapy with intention to cure the disease.   Patient underwent surgical resection of the primary rectal cancer by Dr. Ye on 12/2/2019.  Pathology evaluation reported residual T3N1 disease stage IIIb.  Diarrhea related to therapy and radiation.   Patient was started cycle 1 day 1 of adjuvant FOLFOX 6 on 1/23/2020.  On 2/5/2020 FOLFOX 6 cycle 2 delayed secondary to neutropenia.  Patient was given 3 days of Granix injection.  After cycle #2 we planned 3 days of Granix with each cycle.   Patient also intolerant of oral iron.  Patient received 2 doses of IV injectafer on 02/05/2020 and 02/12/2020.   02/12/2020 Proceed with cycle #2 of FOLFOX 6 with 3 days of Granix.  On 3/11/2020 cycle 4 postponed secondary to abdominal pain and occasional low-grade fevers.  CT scans ordered.  Cycle #4 resumed after CT scan revealed no evidence of disease.  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 Ye. patient has no fever.  Continue to observe.   Grade 3 hand-foot syndrome secondary to 5-FU after cycle #7  FOLFOX 6 chemotherapy, prompting ER visit.  Also has worsening peripheral neuropathy.   Cycle #8 FOLFOX 6 was given on 5/13/2020, with 50% dose reduction for both 5-FU and oxaliplatin, and also examination of bolus 5-FU.   PET scan examination on 5/21/2020 reported no evidence of metastatic disease in the chest abdomen pelvis.  Stable 6 mm RUL pulmonary nodule.  On 5/27/2020, I discussed with the patient that we can discontinue chemotherapy at this time.   Patient had a surgical procedure for low anterior colon resection, coloanal anastomosis on 8/3/2020.  CT scan for chest abdomen pelvis on 9/9/2020 reported a new 8 mm noncalcified pulmonary nodule in the left lower lobe of the lung.  Otherwise stable right upper lobe 6 mm pulmonary nodule, and no evidence of disease recurrence in the abdomen or pelvis.  PET scan examination on 9/18/2020 reported multiple hypermetabolic small pulmonary nodules/ new pulmonary nodules.   Patient was started on pelvic chemotherapy with FOLFIRI regimen on 10/13/2020.   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 It reported tumor mutation burden 5 Muts/Mb, microsatellite stable, TP53 mutation R282W, and others.   Cycle #5 was without irinotecan, due to peripheral neuropathy.  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  Patient was switched to weight-based Lovenox injection.  Cycle #6 5-FU and irinotecan was delayed by 2 weeks because of the above incident.  Patient had a telemedicine evaluation at that the [facility] [place] in February 2021.  They agreed with our treatment plan for palliative chemotherapy followed by maintenance chemotherapy.    PET scan examination on 4/6/2021 after cycle #12 palliative chemotherapy reported no evidence of hypermetabolic metastatic lesion.   Patient was started on maintenance chemotherapy with 5-FU and  Avastin on 4/13/2021. (Unable to tolerate Xeloda because of a significant hand-foot syndrome).   PET scan on 9/24/2021 obtained after cycle #12 maintenance chemotherapy with 5-FU, leucovorin, Avastin reported no evidence for active disease. We recommended 3 months chemotherapy holiday.  CT scan examination on 3/15/2022 reported slightly disease progression with increase in size of small pulmonary nodule.  We started patient back on maintenance chemotherapy on 3/29/2022 treatment with 5-FU plus leucovorin and Avastin, repeating every 2 weeks.  After 6 more maintenance chemotherapy, CT scan for chest abdomen pelvis was done on 6/21/2022 which reported stable sub-6 mm pulmonary nodules.  Patient had last maintenance chemotherapy on 6/7/2022.       HISTORY OF PRESENT ILLNESS:  The patient is a 44 y.o. female with the above-mentioned history, who returns to the office today for triage visit.  She recently resumed chemotherapy 8/21/2024 with 5-FU bevacizumab.  She reports on Saturday she developed sinus congestion, cough and fevers of 102 along with diarrhea.  Unfortunately, the diarrhea has persisted.  Having had previous significant surgeries with no rectum, she always struggles with a baseline of loose bowels though this is uncontrolled.  She is maximizing Imodium and Lomotil and has continued to have significant diarrhea.  Anything she eats results in immediate loose bowels.  Her anus and perianal skin is broken down and irritated.  She is utilizing wipes.  She is quite frustrated and tearful due to the profound amount of diarrhea.  She has continued to have some intermittent left lower quadrant abdominal pain.      Past Medical History:   Diagnosis Date    Abdominopelvic abscess 08/12/2020    ADMITTED TO [facility]    Abnormal Pap smear of cervix 02/02/1998    JULIUS I    Abscess of abdominal wall 11/28/2012    SEEN AT [facility] ER    Anemia in pregnancy     Anxiety     Bilateral epiphora 04/2020    Bilateral hand swelling  05/02/2020    SEEN AT [facility] ER    Cervical lymphadenitis 06/06/2012    SEEN AT [facility] ER    Chemotherapy induced diarrhea 01/2021    Chemotherapy induced neutropenia 04/2020    Chemotherapy-induced nausea 02/2020    Chemotherapy-induced thrombocytopenia 05/2020    Chronic anticoagulation     Chronic diarrhea     Colon polyps     FOLLOWED BY DR. 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 ALEXANDRU HUNT    History of gestational diabetes     History of pre-eclampsia 1998    History of pre-eclampsia     History of radiation therapy 2019    FOLLOWED BY DR. JAVON LEWIS    History of TB skin testing 01/17/2009    TB Skin Test    History of TB skin testing 01/17/2009    TB Skin Test    History of transfusion 2019    12/2019    HPV in female 1998    Hypokalemia 09/2019    Hypomagnesemia 09/2019    Hyponatremia 06/2021    IBS (irritable bowel syndrome)     Ileostomy present 04/25/2020    Take down on 11/3/2022    Infected insect bite of neck 05/27/2016    SEEN AT [facility]    Kidney stones 08/09/2007    SEEN AT [facility] ER    Low anterior resection syndrome 12/2022    FOLLOWED BY DR. GERONIMO ESPARZA    Lump of right breast     SWOLLEN LYMPH NODE    Lung cancer 09/28/2020    METASTATIC LUNG CANCER    Macrocytosis 07/2021    Monopolar depression     On anticoagulant therapy     Pain associated with surgical procedure 01/29/2020    Palmar-plantar erythrodysesthesia  2021    Perirectal abscess 2020    Pulmonary embolism without acute cor pulmonale 09/20/2019    X 3; ADMITTED TO [facility]    Pulmonary nodule, right 2020    Rectal cancer 2019    STAGE IIA, INVASIVE MODERATELY DIFFERENTIATED ADENOCARCINOMA, CHEMO AND XRT FINISHED 2019    Right shoulder pain 2020    SEEN AT [facility] ER    Right ureteral stone 10/01/2019    SEEN AT [facility] ER    SAB (spontaneous ) 1996     A2-1 INDUCED    Sciatica     Sepsis due to cellulitis 2002    LEFT GREAT TOE, ADMITTED TO [facility]    Tachycardia 2020    Thrombosis of superior vena cava 2020    AND BRACHIOCEPHALIC VEIN, ADMITTED TO [facility]    Urinary urgency 2020   Patient had COVID-19 infection diagnosed on 2022 despite was fully vaccinated and boosted.  She recovered without problem.      Past Surgical History:   Procedure Laterality Date    ABDOMINAL SURGERY      CHOLECYSTECTOMY N/A 10/10/2003    LAPAROSCOPIC WITH CHOLANGIOGRAM, DR. JAMEY TALAVERA AT [facility]    COLON RESECTION N/A 2019    Procedure: laparoscopic low anterior colon resection with mobilization of splenic flexure and diverting loop ileostomy: ERAS;  Surgeon: Padmaja Esparza MD;  Location: [facility];  Service: General    COLON RESECTION N/A 2020    Procedure: LOW ANTERIOR COLON RESECTION, COLOANAL ANASTOMOSIS, MOBILIZATION SPLENIC FLEXURE;  Surgeon: Padmaja Esparza MD;  Location: [facility] OR;  Service: General;  Laterality: N/A;    COLONOSCOPY N/A 07/15/2020    PATENT ANASTAMOSIS IN MID RECTUM, RESCOPE IN 1 YR, DR. PADMAJA ESPARZA AT [facility]    COLONOSCOPY N/A 2022    DIFFUSE AREA OF MODERATELY FRIABLE MUCOSA IN ENTIRE COLON , DIVERSION COLITIS, PATENT AND HEALTHY ANASTAMOSIS, DR. PADMAJA ESPARZA AT [facility]    COLONOSCOPY N/A 2023    6 MM SESSILE SERRATED ADENOMA POLYP IN DESCENDING, PATENT ANASTAMOSIS IN DISTAL RECTUM, RESCOPE IN 2 YRS, DR. PADMAJA ESPARZA AT [facility]    COLONOSCOPY W/ POLYPECTOMY N/A 2019    15 MM  TUBULOVILLOUS ADENOMA POLYP IN HEPATIC FLEXURE, 20 MMTUBULOVILLOUS ADENOMA WITH HIGH GRADE DYSPLASIA IN RECTOSIGMOID, 4 CM MASS IN MID RECTUM, PATH: INVASIVE MODERATELY DIFFERENTIATED ADENOCARCINOMA, DR. JENNIFER LI AT [facility]    DILATATION AND EVACUATION N/A 2009    ENDOSCOPY N/A 07/08/2019    LA GRADE A ESOPHAGITIS, GASTRITIS, ALL BIOPSIES BENIGN, DR. JENNIFER LI AT [facility]    ILEOSTOMY CLOSURE N/A 11/14/2022    Procedure: ILEOSTOMY TAKEDOWN;  Surgeon: Geronimo Ye MD;  Location: [facility] OR;  Service: General;  Laterality: N/A;    INCISION AND DRAINAGE PERIRECTAL ABSCESS N/A 08/14/2020    Procedure: INCISION AND DRAINAGE OF retrorectal dehiscence abcess with drain placement and irrigation;  Surgeon: Geronimo Ye MD;  Location: [facility] OR;  Service: General;  Laterality: N/A;    PAP SMEAR N/A 01/24/2014    SIGMOIDOSCOPY N/A 07/24/2019    INFILTRATIVE PARTIALLY OBSTRUCTING LARGE RECTAL CANCER, AREA TATOOED, DR. GERONIMO YE AT [facility]    SIGMOIDOSCOPY N/A 11/23/2019    AN ULCERATED PARTIALLY OBSTRUCTING MASS IN MID RECTUM, AREA TATTOOED, DR. GERONIMO YE AT [facility]    SIGMOIDOSCOPY N/A 07/22/2021    PATENT ANASTAMOSIS IN DISTAL RECTUM, RESCOPE IN 1 YR, DR. GERONIMO YE AT [facility]    TRANSRECTAL ULTRASOUND N/A 07/24/2019    Procedure: ULTRASOUND TRANSRECTAL;  Surgeon: Geronimo Ye MD;  Location: [facility] ENDOSCOPY;  Service: General    URETEROSCOPY LASER LITHOTRIPSY WITH STENT INSERTION Right 10/30/2019    DR. ESTUARDO BEASLEY AT [facility]    VAGINAL DELIVERY N/A 09/18/1998    VENOUS ACCESS DEVICE (PORT) INSERTION Left 08/09/2019    Procedure: INSERTION VENOUS ACCESS DEVICE;  Surgeon: Sj Joseph MD;  Location: [facility];  Service: General    VENOUS ACCESS DEVICE (PORT) INSERTION N/A 12/18/2020    Procedure: INSERTION VENOUS ACCESS DEVICE right side, removal venous access device left side;  Surgeon: Sj Joseph MD;  Location: [facility];  Service: General;   Laterality: N/A;    WISDOM TOOTH EXTRACTION Bilateral 1993       HEMATOLOGIC/ONCOLOGIC HISTORY:      The patient reports she has intermittent rectal bleeding and urgency, mucous with her stool, starting sometime in 2016. At that time she was referred to GI service, and was diagnosed of irritable bowel syndrome. Nevertheless she had worsening urgency for bowel movement, and had a couple of incidents losing control of stool. She was recently seen by Roland Thorpe MD again and had colonoscopy and EGD exam on 07/08/2019. She was found having a circumferential rectal mass. According to the procedure note, the patient had a fungating circumferential bleeding 4 cm mass in the middle rectum, at distance between 13 cm and 17 cm from the anus. Mass was causing partial obstruction. There were also colon polyps found at the hepatic flexure and also at the rectosigmoid junction 23 cm from the anus. Both were resected and retrieved. EGD examination reported grade A esophagitis at the GE junction and patchy discontinuous erythema and aggravation of the mucosa without bleeding in the stomach body. There is normal mucosa of the duodenum. Pathology evaluation from this procedure reported moderately differentiated adenocarcinoma involving the rectal mass. The rectal sigmoid junction polyp was tubular/tubulovillous adenoma with high grade dysplasia negative for invasive malignancy. The hepatic flexure polyp was also tubular/tubulovillous adenoma negative for high grade dysplasia or malignancy. The biopsy from the esophagus reports squamocolumnar mucosa with inflammatory changes suggestive of mild reflux esophagitis, negative for interstitial metaplasia. Gastric biopsy was benign and duodenal biopsy was also benign. There is no mention of H-pylori.     Patient was subsequently referred to colorectal surgeon Padmaja Ye MD for further evaluation. The patient had CT scan examination for chest, abdomen and pelvis requested by Dr. Ye  and were done on 07/13/2019. The study reported no evidence of lymphadenopathy in the abdomen and pelvis. There was wall thickening of the rectosigmoid junction. Normal spleen, pancreas, adrenal glands and kidneys. There was fatty liver infiltration but no focal lymphatic lesions. There was a small 6-7 mm noncalcified nodule in the right upper lobe and a tiny 3 mm subpleural nodule in the right middle lobe. No mediastinal or hilar lymphadenopathy.     Dr. Ye performed staging rectal ultrasound examination. This study reported tumor penetrating through the muscularis propria as T3 disease; there were no lymph nodes identified.    She had a hospitalization in late September 2019 because of newly discovered pulmonary emboli.  She was on prophylactic Lovenox prior to the incident of PE.  Now she is on full dose Xarelto anticoagulation.  Patient reports no further chest pain dyspnea.  She denies bleeding or bruising.  During her hospitalization, she was seen by Dr. Ye, who plans to have surgery 8 to 12 weeks after finishing radiation therapy.  She finished her radiation on 9/19/2019.     I noticed patient also presented to the emergency room on 10/1/2019 complaining of right flank area pain.  I reviewed the images studies and indeed she has a very small 1 to 2 mm obstructing kidney stone in the UV junction.  Patient is still symptomatic with some pains and dysuria.  She denies fever sweating or chills.    Laboratory study on 10/7/2019 reported normal CBC including hemoglobin 13.1, platelets 301,000, WBC 6170 and ANC 4900 lymphocytes 590 monocytes 480.      The nurse reported malfunction of port-a-catheter on 10/7/2019.  Port study on 10/8/2019 showed fibrin sheath around the distal aspect of the Mediport catheter in the SVC. This does not appear to hinder injection, but does prevent aspiration at this time.    Patient underwent surgical resection of the colon on 12/2/2019 with Dr. Ye.  Pathology evaluation  reported residual T3 disease, also 1 out of 14 lymph nodes positive for malignancy.  So this patient in either had at least stage IIIb disease (T3 N1 M0?).      Adjuvant chemotherapy FOLFOX 6 will be started on 1/22/2020.  Laboratory study reported iron saturation 10%, free iron 31 TIBC 319 and ferritin 168.  Her hemoglobin was 11.8, WBC 5600, and platelets 347,000.    Patient was here on 02/12/2020 for cycle 2 of her FOLFOX.  This is delayed x1 week secondary to neutropenia.  The patient ultimately received 3 days worth of Neupogen with recovery of her blood counts.  Of note, the patient struggled with significant nausea without any episodes of vomiting following cycle #1 of chemotherapy resulting in significant weight loss.  She is up 12 pounds over the last week as her appetite has normalized.  We will add Emend to her care plan.    She is having several loose stools per her colostomy and has been hesitant to take Imodium due to prior history of constipation.  I encouraged her to try this with a maximum of 8 tablets/day.  She denies any infectious symptoms including fevers or chills.  No excess bleeding or bruising noted.  She had the expected cold sensitivity related to the Oxaliplatin for about 3 days following treatment.    Labs from 02/12/2020 demonstrated total white blood cell count of 5.14, ANC of 3.06, hemoglobin of 11.2, platelets of 211,000.  She was found to be iron deficient last week and is intolerant to oral iron secondary to GI distress.  For this reason, she initiated IV iron therapy with Injectafer last week.  She had received her second dose 02/12/2020    Patient has normalized hemoglobin 12.2 on 2/26/2020.    She reported on 5/5/2020 she had a recent visit to the emergency department for what was suspected to be an allergic reaction.  She called our on-call service on Saturday with reports of hand and face swelling.  She was instructed to proceed to the emergency department at which time she  was assessed and prescribed a Medrol Dosepak.  She had just completed her 5-FU and was unhooked on Friday, 5/3/2020.  Her symptoms have improved.  She does report persistent hyperpigmentation and mild swelling of the palms of the hands but this is much improved.  It was her right hand specifically that was swollen.  Her facial swelling has resolved.  She continues on Cefdinir nd since has 1 day remaining, she was prescribed cefdinir for a UTI requiring hospitalization at the end of April.  Reports no new symptoms.  Her labs are stable.  She is scheduled for treatment again.    Patient states at this time she is not tolerating her chemotherapy well.     Patient seen in the emergency department on 5/2/2020 for what was suspected to be an allergic reaction.  She called our on-call service on Saturday explaining that she was experiencing hand and face swelling.  She was instructed to proceed to the emergency department at which time she was assessed and prescribed a Medrol Dosepak.  She had just completed her 5-FU and was unhooked on Friday, 5/1/2020.      She reports since her ED visit on 5/2/2020 her symptoms have not improved. Her hands and feet were swollen upon presenting to the ED and she could barely make a fist. She states that she feels so swollen she is not able to stand it. She states that her skin on the hands and feet are peeling extensively as well, besides changing color to more dark.     She also reports significant fatigue after her first week of the 5-FU treatment but she expected this side effect. She also notices significant increase in her neuropathy to the point that she is not able to even walk around in her home without her house slippers due to irritation from her rugs. She denies and associated nausea or vomiting at this time. She also has episodes of epistaxis every day after the chemotherapy cycle #7.  She does report working full-time during the week of chemotherapy.    Laboratory studies,  5/13/2020, show moderate thrombocytopenia platelets 123,000, low normal WBC 4140 including ANC 2720 and normal hemoglobin 13.4.  Because significant hand-foot syndrome, will decrease both 5-FU and oxaliplatin by 50%, and eliminate bolus dose of 5-FU.    On 5/21/2020 patient had a PET scan performed which showed no convincing evidence of residual disease in abdomen or pelvis, no metastatic disease within the chest or neck.     Cycle #8 FOLFOX 6 was given on 5/13/2020, with 50% dose reduction for both 5-FU and oxaliplatin, and also examination of bolus 5-FU.  She states on 5/27/2020 that with the recent reduction of the chemotherapy she feels significantly better. She has more energy and is able to do her daily routine.      PET scan examination on 5/21/2020 reported no evidence of metastatic disease in chest abdomen pelvis.  There was a stable 6 mm right upper lobe pulmonary nodule.    Laboratory studies on 5/27/2020 showed mild leukocytopenia WBC 3720 but a normal ANC 2250 and lymphocytes 630.  Normal platelets 163,000 and hemoglobin 12.6.  Chemistry lab reported normal renal function, liver function panel and a electrolytes, elevated glucose 164.    Laboratory studies 6/24/2020, showed normal hemoglobin 13.4 but macrocytosis .9.  Normal platelets 210,000 and WBC 5870.  She had normal CMP.     Patient last time was here in late June 2020.  Since that time she had surgical procedure for low anterior colon resection, coloanal anastomosis on 8/3/2020.  She later developed a perirectal abscess, required surgical drainage on 8/14/2020.  She was discharged on 8/27/2020.    Patient reports to me she still has lower abdominal wall vacuum suction in place.  She denies fever sweating chills.  Performance status is ECOG 1.  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  She does have visiting nurse come to the home for wound care.    CT scan for chest abdomen pelvis on 9/9/2020 reported a new 8  mm noncalcified pulmonary nodule in the left lower lobe.  Otherwise stable right upper lobe 6 mm pulmonary nodule, and no evidence of disease recurrence in the abdomen or pelvis.     Laboratory study on 9/16/2020 reported elevated AST 55, ALT 95, alk phosphatase 143 but normal total bilirubin 0.3.  Chemistry lab otherwise unremarkable.  She has completed normal CBC.      Because of the abnormal CT scan examination for chest abdomen pelvis on 9/9/2020 reported a new 8 mm noncalcified pulmonary nodule in the left lower lobe, we obtained a PET scan examination for further evaluation, which was done on 9/18/2020.  This study reported several pulmonary nodules, some of them are hypermetabolic, newly developed compared to previous PET scan in May 2020.  Certainly does highly suspicious for metastatic disease.  There are also hypermetabolic activity in the abdomen and pelvic area which is hard to differentiate from surgical scar versus metastatic malignancy.    Laboratory study on 10/13/2020 reported normal CBC with Hb 13.9, platelets 302,000 and WBC 5520 including ANC 3830.  Chemistry lab reported normalized AST 20, alk phosphatase 116 improved ALT 35, and maintains normal bilirubin, with normal electrolytes and liver function panel.     Patient was started on first cycle of palliative chemotherapy FOLFIRI on 10/13/2020.    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  Patient had a new port placed 12/18/2020, and had held her Xarelto for 2 days prior to surgery.  She presented to the ER on 12/21/20 with complaints of facial and arm swelling for 3 days.  She also noted increased shortness of breath.  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  Thrombus within the right internal jugular vein cannot be excluded.  Patient was started on  IV heparin, and eventually transitioned to weight-based Lovenox, which she now continues.    PET scan examination on 9/24/2021 reported further shrinking of the tiny right upper lobe pulmonary nodule.  Otherwise no new lesions.  No evidence for metastatic disease in other areas.      Patient had CT scan for chest with IV contrast obtained on 12/14/2021 which reported small tiny stable pulmonary nodules. There is a 4 mm right upper lobe pulmonary nodule. There is also a stable 4 mm left lower lobe pulmonary nodule. There is also stable left upper lobe micronodule.     Laboratory studies today on 12/21/2021 reported normal hemoglobin 15.9 however .0, platelets 218,000 WBC 5360 including neutrophils 3730 lymphocytes 980. Chemistry lab reported normal renal function, electrolytes, glucose, and marginally elevated ALT 55, AST 34 but normal total bilirubin and alk phosphatase.     CT scan for chest abdomen pelvis 6/21/2022 reported sub-6 mm pulmonary nodules either stable or slightly smaller.  No new pulmonary nodules or evidence for disease progression.  There is no evidence for metastatic disease in the liver however it shows diffuse hepatic steatosis.  There was masslike thickening in the presacral space with the clips or calcification approximately 1.7 cm in greatest AP dimension but stable.    Laboratory study on 9/20/2022 reported normal hemoglobin 15.7 with mild macrocytosis .1.  She has normal CBC and platelets.  She also has unremarkable CMP.  Normal CEA 1.13 ng/mL.    Patient was seen by Dr. Ye, who performed ileostomy takedown on 11/14/2022.  Patient reports that she is recovering.  She still has a small open wound less than 1 cm in diameter, however close to 2 cm deep.  She has been changing dressing herself.  She denies fever sweating chills.    Patient has no other specific complaints.  She has excellent performance status ECOG 0.  She denies chest pain dyspnea cough hemoptysis.  No  abdominal pain.  No melena hematochezia.  Patient has been eating well, stable weight.  She works full-time.    She continues Lovenox injections and denies any significant bleeding issues.   No other new problems or concerns.     CT scan for chest abdomen pelvis obtained on 1/10/2023 reported stable small pulmonary nodules, no evidence for active or new disease.    Laboratory study on 1/10/2023 reported normal CBC and normal CMP.  CEA level is 0.99 ng/mL.    CT scan for chest, abdomen, and pelvis on 7/7/2023 reported stable small pulmonary nodules including a left upper lobe 5 mm nodule. There were no new masses, or pleural effusion. No enlarged supraclavicular, axillary, mediastinal, or hilar lymphadenopathy. Mediastinal vasculature normal. There is occlusion of the superior vena around the catheter with body wall  is present. There was no evidence for disease recurrence in the abdomen or pelvis. Bone is also negative for metastatic disease.    Laboratory studies obtained on 07/07/2023 also reported normal CBC, and normal CMP as well as low level CEA 1.07 ng/mL.    The CT scan for the chest on 10/27/2023 reported enlarging pulmonary nodules bilaterally. The right upper lobe lung nodule increased from 7 x 7 mm to 9 x 8 mm, and the left upper lobe nodule measured 8 x 9 mm from 5 x 6 mm in 04/2023. There is a different left upper lobe nodule 6 x 8 mm from previous 5 x 5 mm. The presacral tissue thickness measures up to 2.3 cm.    A laboratory study on 10/27/2023 reported CEA 1.58 ng/mL, normal CBC, and CMP.  Molecular study of peripheral blood Guardant Reveal is still pending.    The patient presents today on 11/17/2023 for reevaluation to discuss the results of PET scan examination as well as the results of tumor conference. I saw her recently on 11/03/2023 and because of enlarging pulmonary nodules on the CT scan, we requested a PET scan examination. I presented her case yesterday on 11/16/2023 at the  [facility].    The patient reports she is at her baseline condition as 2 weeks ago. No specific complaints, no chest pain, dyspnea, cough, etc. She has a regular bowel movement.    Her case was present at the [facility]. on 11/16/2023. The PET scan images and chest CT scan were reviewed in conjunction with her treatment history. The consensus was for patient to receive stereotactic radiation therapy for the right upper lobe lung lesion, and the bigger left upper lobe lesion, and monitor the smaller left upper lobe lesion with further images studies down the line. Also, the conference recommended Guardant Reveal study which we already ordered.     This Guardant Reveal study came back today as negative for ctDNA 0%.        CT of the chest on 2/2/2024 reported shrinking of the right upper lobe lesion from 9 mm to 6 mm, and the left upper lobe lesion also decreased from 9 mm to 6 mm. Otherwise, there are a couple of stable pulmonary nodules, 7 to 8 mm. The right hilar has a small lymph node that has increased from 6 mm to 8 mm and is otherwise stable. There was no suspicious lesion in the abdomen or pelvis.      MEDICATIONS    Current Outpatient Medications:     clonazePAM (KlonoPIN) 1 MG tablet, Take 1 tablet as needed daily for anxiety. May take one additional as needed for severe anxiety., Disp: 45 tablet, Rfl: 4    Cyanocobalamin (VITAMIN B-12 PO), Take 1 tablet by mouth Every Other Day., Disp: , Rfl:     dicyclomine (BENTYL) 20 MG tablet, TAKE 1 TABLET BY MOUTH EVERY 6 (SIX) HOURS AS NEEDED FOR IRRITABLE BOWEL SYMPTOMS, Disp: 360 tablet, Rfl: 2    diphenoxylate-atropine (LOMOTIL) 2.5-0.025 MG per tablet, TAKE 2 TABLETS BY MOUTH 4 TIMES A DAY, Disp: 240 tablet, Rfl: 0    Enoxaparin Sodium (LOVENOX) 100 MG/ML solution prefilled syringe syringe, Inject 1 mL under the skin into the appropriate area as directed Every 12 (Twelve) Hours., Disp: 60 mL, Rfl: 2    escitalopram  (LEXAPRO) 10 MG tablet, Take 1 tablet by mouth Daily., Disp: 90 tablet, Rfl: 1    famotidine (PEPCID) 20 MG tablet, TAKE 1 TABLET BY MOUTH TWICE A DAY, Disp: 180 tablet, Rfl: 2    Imvexxy Maintenance Pack 10 MCG insert, Insert 10 mcg into the vagina 2 (Two) Times a Week., Disp: , Rfl:     Loperamide HCl (IMODIUM PO), Take  by mouth As Needed., Disp: , Rfl:     Loratadine 10 MG capsule, Take 1 capsule by mouth Every Evening., Disp: , Rfl:     metoprolol succinate XL (TOPROL-XL) 25 MG 24 hr tablet, TAKE 0.5 TABLETS BY MOUTH EVERY NIGHT, Disp: 45 tablet, Rfl: 2    ondansetron (ZOFRAN) 8 MG tablet, TAKE 1 TABLET BY MOUTH THREE TIMES A DAY AS NEEDED FOR NAUSEA AND VOMITING, Disp: 60 tablet, Rfl: 1    rosuvastatin (CRESTOR) 5 MG tablet, Take 1 tablet by mouth Daily., Disp: 90 tablet, Rfl: 0    colestipol (Colestid) 5 g granules, Take 5 g by mouth 2 (Two) Times a Day. (Patient not taking: Reported on 8/28/2024), Disp: 300 g, Rfl: 0  No current facility-administered medications for this visit.    ALLERGIES:   No Known Allergies    SOCIAL HISTORY:       Social History     Tobacco Use    Smoking status: Every Day     Current packs/day: 1.00     Average packs/day: 1 pack/day for 25.0 years (25.0 ttl pk-yrs)     Types: Electronic Cigarette, Cigarettes     Passive exposure: Current    Smokeless tobacco: Never    Tobacco comments:     1 PPD/caffeine use    Vaping Use    Vaping status: Some Days    Substances: Nicotine    Devices: Disposable    Passive vaping exposure: Yes   Substance Use Topics    Alcohol use: Not Currently     Comment: RARELY    Drug use: Never         FAMILY HISTORY:   Mother has positive factor V Leiden mutation, although she did not have thrombosis, mother also is coronary disease with stenting, she also is occasional bruising.    Maternal grandmother had DVT, she had multiple surgical procedures.    Patient mother's half-brother had metastatic colon cancer at diagnosis in his 50s.    Maternal great aunt had  "breast cancer.           Vitals:    08/28/24 1429   BP: 126/70   Pulse: 116   Resp: 18   Temp: 97.8 °F (36.6 °C)   TempSrc: Oral   SpO2: 97%   Weight: 87 kg (191 lb 14.4 oz)   Height: 167.6 cm (65.98\")   PainSc:   2   PainLoc: Rectum         8/28/2024     2:31 PM   Current Status   ECOG score 1     Physical Exam  Vitals reviewed.   Constitutional:       Appearance: Normal appearance. She is well-developed.   HENT:      Head: Normocephalic and atraumatic.      Comments:         Nose: Nose normal.   Eyes:      Conjunctiva/sclera: Conjunctivae normal.   Cardiovascular:      Rate and Rhythm: Normal rate and regular rhythm.      Heart sounds: Normal heart sounds.   Pulmonary:      Effort: Pulmonary effort is normal.      Breath sounds: Normal breath sounds.   Abdominal:      General: Bowel sounds are normal.      Palpations: Abdomen is soft. There is no mass.      Tenderness: There is no abdominal tenderness.   Musculoskeletal:      Right lower leg: No edema.      Left lower leg: No edema.   Neurological:      Mental Status: She is alert. Mental status is at baseline.   Psychiatric:         Mood and Affect: Mood normal.         RECENT LABS:  Results from last 7 days   Lab Units 08/28/24  1417   WBC 10*3/mm3 6.38   NEUTROS ABS 10*3/mm3 4.28   HEMOGLOBIN g/dL 15.5   HEMATOCRIT % 45.3   PLATELETS 10*3/mm3 199     Results from last 7 days   Lab Units 08/28/24  1417   SODIUM mmol/L 138   POTASSIUM mmol/L 3.6   CHLORIDE mmol/L 102   CO2 mmol/L 21.9*   BUN mg/dL 10   CREATININE mg/dL 0.67   CALCIUM mg/dL 9.1   ALBUMIN g/dL 4.1   BILIRUBIN mg/dL 0.4   ALK PHOS U/L 92   ALT (SGPT) U/L 22   AST (SGOT) U/L 18   GLUCOSE mg/dL 108*   MAGNESIUM mg/dL 2.1           IMAGING:  NM PET/CT Skull Base to Mid Thigh  F-18 FDG PET SKULL BASE TO MID THIGH WITH PET CT FUSION.     HISTORY: 44-year-old female with rectal cancer. Restaging.     TECHNIQUE: Radiation dose reduction techniques were utilized, including  automated exposure control and " exposure modulation based on body size.   Blood glucose level at time of injection was 89 mg/dL. 6.8 mCi of F-18  FDG were injected and PET was performed from skull base to mid thigh. CT  was obtained for localization and attenuation correction. Time at  injection 8:00 a.m. PET start time 9:22 a.m. Normalization method:  patient weight. Compared with compared with PET/CT 11/08/2023, chest CT  08/02/2024, CTs of the chest abdomen pelvis 04/23/2024.     FINDINGS: Mediastinal blood pool has a maximal SUV of 2.4, previously  2.4.     1. There are new pulmonary nodules since the previous PET/CT and there  are nodules which have increased in size. A new 8 mm right upper lobe  nodule has an SUV of 3.7. An 8 mm nodule at the superior medial aspect  of the left lower lobe previously measured 5 mm and has an SUV of 2.4,  previously 1.5. Hypermetabolic upper lobe nodules on the previous PET/CT  appear to have been treated. There is a 6 mm right upper lobe nodule  adjacent to a bandlike scar with an SUV of 2.8. There is no  hypermetabolic mediastinal or hilar lymphadenopathy.     2. There is heterogeneous activity scattered throughout the liver, but  there are no suspicious foci. There is no hypermetabolic lymphadenopathy  within the abdomen.     3. At the pelvis, there is circumferential hypermetabolic activity at  the distal colon extending to the anus, approximately 9 cm segment of  colon with an SUV of 9.8. There is a fairly large volume of formed stool  within this segment and the finding is indeterminate as recent enemas or  stercoral chronic colitis may have this appearance. Correlation with  endoscopy is recommended. The presacral soft tissue thickening has  low-level activity with an SUV of 2.7.  There is no hypermetabolic lymphadenopathy within the pelvis.     4. There is no suspicious hypermetabolic activity at the neck. No  significant change in the mucous retention cysts at both maxillary  sinuses.     This report  was finalized on 8/15/2024 8:20 AM by Dr. Caroline Solano M.D on  Workstation: [id number]         ASSESSMENT:   1.  Metastatic rectal cancer.   Initial rectal ultrasound examination reported T3N0 disease without lymphadenopathy.   CT scan of chest, abdomen and pelvis reported no lymphadenopathy in the abdomen, pelvis or chest. She does have small pulmonary nodule 6-7 mm and 2 mm with indeterminate feature. There is no solid evidence of metastatic disease otherwise.   Based on the CT scan and rectal ultrasound imaging studies, this patient had stage IIA (T3N0M0) disease.   She had PET scan examination on 8/8/2019 which reported a hypermetabolic right upper lobe pulmonary nodule 6 mm with SUV 5.6.  This is suspicious for metastatic disease however it is too small to be biopsied.  This patient may have stage IV disease.   She initiated concurrent radiation with continuous 5-FU on 8/12/2019.  Patient finished concurrent chemoradiation therapy.  Patient underwent surgical resection of the rectal tumor and diverting loop ileostomy on 12/2/2019 with Dr. Ye.  Pathology evaluation reported residual T3 disease, with 1 out of 14 lymph nodes positive for malignancy.  Certainly this patient has at least stage IIIb rectal cancer (T3N1M0?)  On 1/22/2020 adjuvant chemotherapy FOLFOX 6 regimen initiated.   On 2/5/2022 cycle #2 was delayed secondary to neutropenia.  She was given 3 days of Granix.   Emend added with cycle 3.  With improved nausea control  Continuing home Granix x3 days following 5-FU disconnect  3/11/2020 due for cycle 4, however, she is experiencing progressive abdominal pain and occasional fevers.   CT scan performed on 3/13/2020 reveals no evidence of progressive or recurrent disease.  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  Patient hospitalized 4/24-4/26/20 after cycle 5 of chemotherapy with acute UTI.  CT abdomen/pelvis noting fluid collection in the presacral region having diminished in size compared to CT  dated 3/13/2020.  Patient was evaluated by Dr. Ye while in the hospital with further plans to evaluate possible colovaginal fistula following completion of chemotherapy.  Patient did respond to IV antibiotics and discharged home on oral cefdinir.  4/29/2020 cycle 6 of FOLFOX.  Urinary symptoms have resolved   Patient seen in the [facility] ED on 5/2/2020 for what was suspected to be an allergic reaction.  She called our service on Saturday explaining that she was experiencing hand and face swelling.  She was instructed to proceed to the emergency department at which time she was assessed and prescribed a Medrol Dosepak.  She had just completed her 5-FU and was unhooked on Friday, 5/1/2020.  Her symptoms have resolved in the office on assessment, 5/5/2020.  The patient had grade 3 hand-foot syndrome based on symptomology.  Patient had cycle #8 of 5-FU on 5/13/2020. Due to her symptoms and poor tolerance to the 5-FU, her treatment dose will be reduced 50% for oxaliplatin and infusional 5-FU.  Bolus 5-FU will be discontinued..  On 5/21/2020 patient had a PET scan and it showed no evidence of of metastatic disease in the neck, chest, abdomen or pelvis.  There was fluid accumulation/abscess.   On 5/27/2020 discussed with the patient that we can discontinue chemotherapy at this time.  She will follow-up with Dr. Ye to discuss a possibility to reverse the ileostomy.  We likely will obtain anther PET scan in 3 to 4 months for reassessment.    Patient was seen by Dr. Ye on 6/19/2019 for evaluation and to discuss possible take down of her ileostomy.  She is scheduled to have a gastrografin enema on 7/2/2020 to evaluate for a fistula, and then a colonoscopy on 7/15/2020, both done by Dr. Ye.  She states that based on the above imaging and procedure findings, she may have a more extensive surgery done or just have her ileostomy reversed, which would likely be done in August 2020.  This will all be coordinated  under the care of Dr. Ye.     CT scan from 09/09/2020 and also compared to her previous PET scan examination from 05/21/2020 and also the original PET scan from 08/09/2019.  The original PET scan there is a small right upper lobe pulmonary nodule 6 mm with SUV 5.6. So in the 05/2020 PET scan the nodule is still there but activity seems much less and this most recent CT scan examination also documented the preexisting small pulmonary nodule however there is a new left lower lobe pulmonary nodule 8 mm in size and I shared with the patient today this nodule is suspicious for metastatic disease. Laboratory studies reported minimal CEA level 1.32 on 09/09/2020. Liver function panel was only marginally abnormal with the ALT 45, the remaining is normal. However laboratory study today showed worsening AST 55, ALT 95 and alkaline phosphatase 143 but is still normal, total bilirubin 0.3.   Recommended to have repeat PET scan examination for assessment because the left lower lobe pulmonary nodule is too small to be biopsied, and if the PET scan reports hypermetabolic lesion, I recommended to have stereotactic radiation therapy. Explained to patient that she is not a really good candidate to have thoracotomy for resection because she still has unhealed wound in the abdomen. Stereotactic radiation therapy will likely be a more feasible choice.   PET scan examination obtained on 9/18/2020 showed metastatic disease.  It reported a few hypermetabolic pulmonary nodules, and some new small pulmonary nodules, all of them highly suspicious for metastatic disease.  She also has hypermetabolic activity in the abdomen and pelvic area which could be related to scar tissue from her recent surgery versus metastatic disease.  Nevertheless overall picture fits with metastatic cancer.  Discussed with the patient on 9/20/2020, we reviewed the PET scan images together, and I recommended to have systematic chemotherapy, I do not think  stereotactic reading therapy to the hypermetabolic lesions in the lungs warranted at this time because even those will be treated with reading therapy, she will still need systematic chemotherapy.  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  I did discuss with the patient using anti-EGFR monoclonal antibody versus anti-VEGF monoclonal antibody.     Palliative chemotherapy cycle#1 FOLFIRI was started on 10/13/2020.  No bolus 5-FU given considering previous poor tolerance.    NGS study from [facility] reported positive for [place] mutation.  Microsatellite stable, mutation burden 5/Mb.   Discussed with the patient 10/27/2020, because of the K-[place] mutation, she is not a candidate for anti-EGFR monoclonal antibody such as Erbitux or vectibix.  She could be a candidate for anti-VEGF monoclonal antibody, however because of active wound, she is not a candidate right now at this moment.  Patient seen in the ED for acute right neck pain on 11/16/2020.  CT angiogram as well as CT of the cervical spine performed with no acute findings but notably one of her pulmonary nodules, left upper lobe, somewhat decreased in size.  Patient given prednisone pack.  Further details below.  Cycle #6 chemotherapy will be resumed on 1/5/2021.  Started back on original irinotecan.  PET scan examination obtained on 1/12/2021 after cycle #6 chemotherapy reported improved pulmonary nodule hypermetabolic activity.  Confirmed these are metastatic lesions.  Patient is evaluated 1/19/2020, will continue cycle #7 FOLFIRI chemotherapy.  However Avastin will be on hold see next section.   Patient was reevaluated on 2/2/2021, will continue chemotherapy cycle #8 FOLFIRI.  Avastin / biosimilar will be added.    [facility] in mid February 2021, and had recommended palliative chemotherapy without surgical intervention of metastatic lung lesions.   On 3/2/2021,  patient will proceed with cycle #10 FOLFIRI plus bevacizumab.  After 12 cycles, plan to start maintenance treatment.  3/16/2021 cycle 11.  Plus bevacizumab.  3/30/2021 cycle 12 FOLFIRI plus bevacizumab.  Having issues with worsening nausea despite premeds with dexamethasone, Aloxi, Emend, and Zofran at home.  Patient is requesting a dose of Phenergan, which is helped her in the past.  We will give this to her with treatment today.  PET scan examination on 4/6/2021 reported no evidence of hypermetabolic metastatic lesion.  Discussed with the patient on 4/13/2021, we reviewed the PET scan results.  We recommended to switch to maintenance chemotherapy without irinotecan.  We will continue 5-FU and Avastin every 2 weeks.  We discussed possibility of switching to oral Xeloda treatment.  Discussed with the patient for side effects more so with hand-foot syndrome.  Patient is agreeable.   Xeloda 2000 mg twice daily 7 days on, 7 days off along with Avastin initiated 4/27/2021.  After only 5 doses of Xeloda significant hand-foot syndrome, Xeloda held. Patient requesting to be transitioned back to infusional 5-FU, as she felt that she tolerated infusional 5-FU without any problem.  The patient will receive 5-FU leucovorin and Avastin every 2 weeks.  Xeloda discontinued.  5/25/2021 continued cycle #4 maintenance 5-FU, leucovorin, Avastin tolerating this much better so far, we will continue this. Recommended to have 12 cycles of maintenance chemotherapy, then obtain PET scan for reevaluation.  We could discuss further treatment plan at that time.  9/14/2021 patient due for cycle 12 of additional maintenance 5-FU/leucovorin plus Avastin.  She continues to tolerate treatment well overall.  She is anxious in the office today with her Mediport not getting blood at first and also with upcoming scans being heavy on her mind.  She was instructed to take one of her Klonopin pills while here to help calm her mind.  Heart rate did  improve from 140s down to 112.  Proceed with treatment today as scheduled.  PET scan examination on 9/24/2021 reported further shrinking of the right upper lobe tiny pulmonary nodule.  No other new lesions.  Discussed with patient on 9/24/2021 about further shrinking of the right upper lobe pulmonary nodule and no evidence for other new lesions. We recommended to have chemo break for 3 months with repeated CT scan for reevaluation.  Recent CT scan for chest 12/14/2021 and abdomen CT from 11/29/2021 reported no evidence for active disease. The right upper lobe pulmonary nodule, left lower lobe pulmonary nodule minimal about 4 mm and stable. I discussed with patient today on 12/21/2021, recommended to give her another 3 months chemotherapy holiday and obtain CT scan afterwards for reevaluation. After discussion, patient is agreeable.   CT scan examination for chest abdomen and pelvis obtained on 3/15/2022 reported a slight increase of the left upper lobe pulmonary nodule 5 mm, from previous 3 mm.  The other pulmonary nodules were stable in size.  There is also small left upper lobe groundglass changes.   Dr. Nguyen reviewed images studies with the patient 3/23/2022, compared to multiple previous images including CT chest CT and PET scan, suspected disease progression.  Discussed with the patient, and I recommended to resume maintenance chemotherapy with 5-FU plus leucovorin plus Avastin repeating every 2 weeks, and obtaining CT scan for reassessment in 3 months.  Patient is agreeable.  Patient resumed maintenance chemotherapy on 3/29/2022 with 5-FU leucovorin and Avastin.   4/26/2022, proceed with cycle #27 maintenance 5-FU, leucovorin, and Avastin.  Patient continues to tolerate treatment well.    On 5/10/2022, we will proceed ahead with cycle #28 maintenance chemotherapy.  Plan to have CT scan after cycle #30.  5/24/2022 cycle 29 maintenance chemotherapy.  Continue to tolerate well.  6/7/2022 cycle #30 maintenance  chemotherapy, continuing to tolerate well.   CT scan for chest abdomen pelvis with IV contrast obtained on 6/21/2022 reported sub-6 mm pulmonary nodules either stable or slightly smaller.  We reviewed the images with the patient together.  We discussed with the patient and recommended to hold chemotherapy for now with repeating CT scan in 3 months for reassessment.  CT scan of the chest abdomen pelvis 9/13/2022 reported stable condition, no evidence for recurrent or metastatic colon cancer.  On 1/10/2023 patient had a CT chest abdomen pelvis with reported no evidence for disease progression.  Laboratory study also showed normal CEA.   Patient had a CT scan for chest, abdomen, and pelvis obtained on 04/11/2023. This study reported very small pulmonary nodules, the right upper lobe nodule is stable to 6 mm, however, the left upper lobe and the left lower lobe nodule increased to 5 mm from previous 4 mm. I discussed with the patient today on 04/19/2023, I recommend to obtain another CT scan in 3 months for reassessment. The nodules are so small, they likely will not be picked up by PET scan examination.  CT scan examination of the chest, abdomen, and pelvis obtained on 7/7/2023 reported stable small pulmonary nodules. No evidence for disease progression or new lesions in chest, abdomen, and pelvis.  Discussed with patient today on 7/14/2023, however, patient is concerning for disease progression. I recommended to obtain Guardant Reveal for circulating tumor DNA study. If the study is positive, we will further obtain PET scan examination, otherwise, we will obtain CT scan for chest, abdomen, and pelvis in 3 months for reassessment.  The patient had CT scan for the chest, abdomen, and pelvis obtained on 10/27/2023, which reported worsening pulmonary nodules at bilateral upper lobes. Laboratory studies showed low normal CEA level and normal liver function panel. The Guardant Reveal study is still pending. I discussed this  with the patient on 11/03/2023 and we shared the images, and I recommended having a PET scan examination for further assessment. I will present her case at the multimodality lung conference. If those lesions are deemed to be metastatic lesions, I recommend having stereotactic radiotherapy. I discussed it with the patient, and she voiced understanding and was agreeable with that strategy.  I presented her case at the multimodality chest conference 11/16/2023.  The consensus was for patient to receive stereotactic radiation therapy for the right upper lobe lung lesion, and the bigger left upper lobe lesion, and monitor the smaller left upper lobe lesion with further images studies down the line. Also, the conference recommended Guardant Reveal study which we already ordered. I discussed with the patient today on 11/17/2023, we explained to the patient that the opinion of the conference and she is agreeable with this and prefers this strategy because of limited toxicity compared to long term commitment to starting chemotherapy again. I recommend to obtain a CT scan for the chest, abdomen, and pelvis with both IV and oral contrast for reassessment in 3 months for reassessment of metastatic lung lesions and thickness in the pelvis where the PET scan reported a low activity SUV 2.4 in the surgical bed.   The patient had steroid-induced radiation therapy for the right upper lobe and one of the left upper lobe lesions.  Her CT scan examination on 02/02/2024 reported shrinking nodules of the right upper lobe and one of the left upper lobe lesions, both from 9 mm down to 6 mm. There are 2 stable pulmonary nodules 7 mm. Nothing new.  02/09/2024, I discussed with the patient and recommended CT scan for the chest, abdomen, and pelvis in 3 months for reassessment. Guardant Reveal study was 0% ctDNA. We will also continue laboratory studies every 3 months for Guardant Reveal, together with routine labs, CBC, CMP, and CEA.  CT scan  of the chest, abdomen, and pelvis conducted on 4/23/2024 revealed stable multiple pulmonary nodules, with no other new pulmonary nodules or evidence of disease recurrence or abnormal pelvis. The patient's liver function panel was normal, and low-level CEA level was also noted. The Guardant Reveal study was able to be obtained earlier due to regulatory rules, and we hugo obtain today the Guardant reveal study, be available within 10 to 14 days.   Given the patient's metastatic liver lesion, and lung lesions from colon cancer, careful monitoring is necessary. A chest CT scan with IV contrast will be arranged in 3 months for reevaluation. The study will be reviewed again in 3 months, which will include CBC, CMP, and CEA level.   Imaging study with chest CT scan on 08/02/2024 reported multiple bilateral pulmonary nodules that are relatively stable. However, the Guardant Reveal reported positive ctDNA indicating disease progression. A subsequent PET scan examination on 08/14/2024 reported newly developed hypermetabolic pulmonary nodules. The highest SUV is 3.7, corresponding to an 8 mm new right upper lobe nodule, and there are several other hypometabolic nodules in the lungs.   8/21/2024 resumption of chemotherapy with 5-FU bevacizumab  Triage visit 8/28/2024 with intractable diarrhea that was unclear if this is secondary to chemotherapy or infectious etiology given her known chronic colitis, fever and abdominal pain.  Further management as outlined below      2.   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  Low-dose Lovenox discontinued and initiated Xarelto  20 mg daily.  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  Patient held Xarelto for 2 days prior to MediPort removal from the left chest wall and placement of new port in the right chest wall on 12/18/2020.  She resumed Xarelto that evening.  Progressive swelling in the neck, face, upper extremities prompting hospitalization with CT scan showing thrombosis in the right subclavian vein and SVC.  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  Although she had been off of Xarelto for a few days, clearly Xarelto was not prohibiting progression of her thrombosis after she resumed treatment and there was some evidence to suggest thrombosis had been present at least in the innominate vein on prior scan in September but now appears chronic.  Current acute thrombosis involving SVC and right subclavian.  Patient was admitted and placed on heparin drip  Patient was evaluated by vascular surgery and intervention was not recommended for thrombolysis/thrombectomy.  On 12/22/2020, the patient developed worsening shortness of breath, increasing upper extremity edema consistent with worsening SVC syndrome.  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  Symptoms improved and patient was discharged 12/24/2020 on Lovenox 100 mg every 12 hours.    Returns 12/29/2020 for evaluation continuing Lovenox, overall tolerating it well.  No bleeding issues.  Improved edema 1/5/2021.  Will continue Lovenox weight-based every 12 hours.  On 1/19/2021 and 2/2/2021, patient reports improved facial swelling.  She however does have being bruise on her abdomen wall where she does Lovenox injection.  However she does not have a  spontaneous epistaxis, gum bleeding or other bleeding.   On 2/2/2021, we discussed that side effects of Avastin/biosimilar related to thrombosis.  Since this patient already has been on Lovenox, I think the benefits from treatment for her cancer will outweigh that risk of thrombosis, will proceed ahead with Avastin.  I cautioned patient to watch out for signs of worsening thrombosis patient voiced understanding.   5/11/2021 Lovenox dosing will be adjusted due to patient's weight loss.  We discussed she needs 1 mg/kg.  Her syringes are 100 mg syringes she will do 0.9 mL subcu every 12 hours.  8/3/2021 Patient continues to have bruising on abdomen from lovenox injections.  Will need to monitor weight and adjust dosing in future if further weight loss.   Stable condition on 9/28/2021.  Continue Lovenox anticoagulation.    Patient reports no chest pain no dyspnea no leg edema. However she had bruising and also most recently abnormal small abscess for which she actually went to ER on 11/29/2021, had I&D. The wound is healing. No further drainage. Denies fever sweating or chills. After discussion, she will continue Lovenox injection for now.   On 3/23/2022, patient reports good tolerance of Lovenox.  Nevertheless she still has small quantity of pus in the left lower abdomen abscess, she squeezes it every day to prevent it became worse.  She reports no fever sweating chills.  Recommended to start Augmentin 875 mg twice a day for 7 days.  She will continue Lovenox indefinitely.  On 4/12/2022, patient reports resolution of the small abscess at left lower abdominal wall.   On 5/10/2022, patient continues on Lovenox indefinitely.  No easy bleeding or bruising.  6/23/2022 continuing on Lovenox 1 mg/kg at a dose of 100 mg (patient weight is 96.6 kg today) every 12 hours.  On 1/17/2023, patient reports good tolerance, Lovenox will be continued.    Patient had a venous Doppler study on 02/05/2023, which reported acute left upper  extremity DVT in the subclavian vein, axillary and the brachial vein, and also superficial thrombophlebitis in the basilic upper arm.   On 04/19/2023, patient reports that she was not compliant with anticoagulation at the time of recurrent DVT. She now is fully compliant and is using Lovenox.  She continues on anticoagulation.  She has only occasional blood with wiping which is related to her frequent diarrhea    3.  This patient also has strong family history for malignancy   The patient had appointment with genetic counseling on September 3, 2019.  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    4.  Mild anemia.   She also has history of iron deficiency.  Iron studies reveal iron saturation of 10%.  She was started on oral iron but was unable to tolerate due to GI side effects.   Status post Injectafer 2/5/2020 and 2/12/2020   Improved hemoglobin 13.5 on 1/5/2021.  3/16/2020 when hemoglobin now up to 16.2, hematocrit 47.6.  Patient admits that she has started smoking again, and I have encouraged her to quit.  She denies any snoring or sleep apnea diagnosis.  Patient is not taking oral iron.  We will closely monitor.  3/30/2020 hemoglobin 15.7, HCT 47.5.  Patient states that she has cut back significantly on her smoking, although not quit yet.  I have encouraged her to continue working on this.  We will continue to closely monitor.  Hemoglobin 14.6 on 6/8/2021 at the meantime he has worsening macrocytosis .6.    Laboratory studies 6/22/2021 reported excellent folate > 20 ng/mL, however low normal B12 level 357 pg/mL.    On 7/6/2021, normal hemoglobin 15.8, .9 and HCT 47.2%.  Patient was asked to start oral vitamin B12 at 1000 mcg daily.   9/14/2021 hemoglobin 17.0, hematocrit 52.1.  Monitor.  On 9/28/2021 hemoglobin 16.1 hematocrit 48.5%, continue to monitor.   Lab study on 12/14/2021 reported excellent ferritin 296 and iron saturation 25% with free iron 104 TIBC 424. Hemoglobin was 15.2 with MCV  100.2.   Normal hemoglobin 14.6 on 3/15/2022.  Iron study reported normal ferritin 129.5 ng/mL however slightly low iron saturation 11%.  Continue to monitor for now.  4/26/2022, hemoglobin 14.8.  6/7/2022 hemoglobin 15.5.  On 9/20/2022 Hb 15.7, .1.  On 1/10/2023 normal hemoglobin 14.7 MCV 95.0.  Laboratory study on 04/11/2023 reported normal hemoglobin 13.9.  Lab study on 7/7/2023 reported normal hemoglobin 14.9.  Hemoglobin is currently improved at 15.4    5.  Peripheral neuropathy secondary to chemotherapy.   This is mild involving bilateral hands and feet as reported on 9/16/2020.   Will avoid chemotherapy with oxaliplatin.  Stable mild neuropathy as of 11/10/2020  Patient reports worsening peripheral neuropathy and cycle #5 chemotherapy on 12/8/2020 with and without irinotecan.   On 1/5/2021, patient reports improvement of peripheral neuropathy, will add irinotecan back to chemotherapy.  Continue to monitor and adjust medication.  No worsening peripheral neuropathy today.     6.  Depression.  Patient seen by JULIET Davenport on 11/9/2020.  She was started on mirtazapine.  Lexapro was discontinued.  This has definitely improved her mood with the patient feeling overall much better.  She is sleeping better.  Appetite is improved with her actually eating more than she wants to.    Condition is stable.  She plans to continue follow-up with supportive oncology.    7. Malfunction of the portal catheter  Patient reports there was no blood drawn from the portal catheter. CT scan of the chest on 7/7/2023 reported occlusion of the SVC around to the Port-A-Cath tubing. I recommend trying activase to see if it helps.  Otherwise, we may have to remove the catheter. Clinically, patient has no signs of SVC syndrome.  On 11/03/2023, the patient reports that her Port-A-Cath was able to be used for a CT scan for the injection of intravenous dye; however, there was no blood return, so we needed to draw from her arm for  the blood test. We will continue to keep Port-A-Cath for now.  On 02/09/2024, the patient reports that the port was able to be flushed. However, no blood was returned. We will continue to flush every 6 weeks.    *Intractable diarrhea  Patient developed diarrhea on Saturday, 8/24/2024.  Is unclear if this is related to infection as she did have fevers at the time the diarrhea began or chemotherapy.  She does have chronic colitis noted on most recent PET scan, this therefore could be diverticulitis flare  GI panel and C. difficile pending  Begin empiric Flagyl 500 mg 3 times daily x 10 days          PLAN:    1 L normal saline given in the infusion area today  GI panel and C. difficile pending  Continue to alternate Imodium and Lomotil with maximum of 8 tablets of each in 24 hours  We discussed the benefit of marshmallows for thickening her stool  Begin Flagyl 500 mg 3 times daily x 10 days  Begin Protonix 40 mg daily  Return tomorrow for 1 L normal saline  Magic barrier cream has been called to local pharmacy  Continue anticoagulation with Lovenox subcu injection every 12 hours.  A liquid biopsy/Guardant reveal study will be conducted to monitor her response together with intermittent imaging studies.  Nurse practitioner follow-up on Friday, 8/30/2024 with CBC, CMP magnesium 1 every day and 1 L normal saline    Today's plan of care was discussed reviewed with Dr. Nguyen who is in agreement    I spent 40 minutes caring for Carole on this date of service. This time includes time spent by me in the following activities: preparing for the visit, reviewing tests, obtaining and/or reviewing a separately obtained history, performing a medically appropriate examination and/or evaluation, counseling and educating the patient/family/caregiver, ordering medications, tests, or procedures, referring and communicating with other health care professionals, documenting information in the medical record, independently interpreting  results and communicating that information with the patient/family/caregiver, and care coordination.      Patience Lorenzo, APRN  08/28/2024        CC:  Deepika Vela III NP-C   MD Alverto Bowers MD

## 2024-08-28 NOTE — TELEPHONE ENCOUNTER
Patient called and stated she restarted chemotherapy on 8/21/24 and 5 FU disconnected on 8/23/24. Patient stated she started having diarrhea on 8/24/24 and has been taking Lomotil and Imodium and is not helping. Patient verbalized she has not been eating due to she is afraid it will cause the diarrhea to worsen.   Spoke with Daksha and Patience CHUNG and they agreed patient needs to come into the office and have STAT labs, follow up with them and receive IV fluids.      Patient scheduled for 2:25 pm. Patient v/u

## 2024-08-29 ENCOUNTER — INFUSION (OUTPATIENT)
Dept: ONCOLOGY | Facility: HOSPITAL | Age: 44
End: 2024-08-29
Payer: COMMERCIAL

## 2024-08-29 VITALS
TEMPERATURE: 98.4 F | HEART RATE: 103 BPM | WEIGHT: 193.4 LBS | SYSTOLIC BLOOD PRESSURE: 127 MMHG | BODY MASS INDEX: 31.23 KG/M2 | DIASTOLIC BLOOD PRESSURE: 79 MMHG | RESPIRATION RATE: 16 BRPM | OXYGEN SATURATION: 95 %

## 2024-08-29 DIAGNOSIS — T45.1X5A CHEMOTHERAPY INDUCED DIARRHEA: Primary | ICD-10-CM

## 2024-08-29 DIAGNOSIS — Z45.2 ENCOUNTER FOR FITTING AND ADJUSTMENT OF VASCULAR CATHETER: ICD-10-CM

## 2024-08-29 DIAGNOSIS — K52.1 CHEMOTHERAPY INDUCED DIARRHEA: Primary | ICD-10-CM

## 2024-08-29 DIAGNOSIS — T45.1X5A ADVERSE EFFECT OF ANTINEOPLASTIC AND IMMUNOSUPPRESSIVE DRUGS, INITIAL ENCOUNTER: ICD-10-CM

## 2024-08-29 PROCEDURE — 25010000002 OCTREOTIDE PER 25 MCG: Performed by: INTERNAL MEDICINE

## 2024-08-29 PROCEDURE — 25010000002 HEPARIN LOCK FLUSH PER 10 UNITS: Performed by: INTERNAL MEDICINE

## 2024-08-29 PROCEDURE — 96361 HYDRATE IV INFUSION ADD-ON: CPT

## 2024-08-29 PROCEDURE — 96360 HYDRATION IV INFUSION INIT: CPT

## 2024-08-29 PROCEDURE — 96372 THER/PROPH/DIAG INJ SC/IM: CPT

## 2024-08-29 PROCEDURE — 25810000003 SODIUM CHLORIDE 0.9 % SOLUTION: Performed by: NURSE PRACTITIONER

## 2024-08-29 RX ORDER — SODIUM CHLORIDE 0.9 % (FLUSH) 0.9 %
10 SYRINGE (ML) INJECTION AS NEEDED
Status: CANCELLED | OUTPATIENT
Start: 2024-08-29

## 2024-08-29 RX ORDER — SODIUM CHLORIDE 0.9 % (FLUSH) 0.9 %
10 SYRINGE (ML) INJECTION AS NEEDED
Status: DISCONTINUED | OUTPATIENT
Start: 2024-08-29 | End: 2024-08-29 | Stop reason: HOSPADM

## 2024-08-29 RX ORDER — OCTREOTIDE ACETATE 200 UG/ML
150 INJECTION INTRAVENOUS ONCE
Status: DISCONTINUED | OUTPATIENT
Start: 2024-08-29 | End: 2024-08-29

## 2024-08-29 RX ORDER — HEPARIN SODIUM (PORCINE) LOCK FLUSH IV SOLN 100 UNIT/ML 100 UNIT/ML
500 SOLUTION INTRAVENOUS AS NEEDED
Status: DISCONTINUED | OUTPATIENT
Start: 2024-08-29 | End: 2024-08-29 | Stop reason: HOSPADM

## 2024-08-29 RX ORDER — HEPARIN SODIUM (PORCINE) LOCK FLUSH IV SOLN 100 UNIT/ML 100 UNIT/ML
500 SOLUTION INTRAVENOUS AS NEEDED
Status: CANCELLED | OUTPATIENT
Start: 2024-08-29

## 2024-08-29 RX ORDER — OCTREOTIDE ACETATE 200 UG/ML
150 INJECTION INTRAVENOUS ONCE
Status: CANCELLED
Start: 2024-08-29 | End: 2024-08-29

## 2024-08-29 RX ORDER — SODIUM CHLORIDE 9 MG/ML
1000 INJECTION, SOLUTION INTRAVENOUS ONCE
Status: COMPLETED | OUTPATIENT
Start: 2024-08-29 | End: 2024-08-29

## 2024-08-29 RX ORDER — OCTREOTIDE ACETATE 500 UG/ML
150 INJECTION, SOLUTION INTRAVENOUS; SUBCUTANEOUS ONCE
Status: COMPLETED | OUTPATIENT
Start: 2024-08-29 | End: 2024-08-29

## 2024-08-29 RX ADMIN — SODIUM CHLORIDE 1000 ML: 9 INJECTION, SOLUTION INTRAVENOUS at 12:34

## 2024-08-29 RX ADMIN — Medication 10 ML: at 14:28

## 2024-08-29 RX ADMIN — OCTREOTIDE ACETATE 150 MCG: 500 INJECTION, SOLUTION INTRAVENOUS; SUBCUTANEOUS at 14:28

## 2024-08-29 RX ADMIN — Medication 500 UNITS: at 14:28

## 2024-08-30 ENCOUNTER — INFUSION (OUTPATIENT)
Dept: ONCOLOGY | Facility: HOSPITAL | Age: 44
End: 2024-08-30
Payer: COMMERCIAL

## 2024-08-30 ENCOUNTER — OFFICE VISIT (OUTPATIENT)
Dept: ONCOLOGY | Facility: CLINIC | Age: 44
End: 2024-08-30
Payer: COMMERCIAL

## 2024-08-30 ENCOUNTER — LAB (OUTPATIENT)
Dept: LAB | Facility: HOSPITAL | Age: 44
End: 2024-08-30
Payer: COMMERCIAL

## 2024-08-30 VITALS
TEMPERATURE: 98.2 F | HEIGHT: 66 IN | OXYGEN SATURATION: 96 % | DIASTOLIC BLOOD PRESSURE: 64 MMHG | HEART RATE: 73 BPM | WEIGHT: 197.3 LBS | BODY MASS INDEX: 31.71 KG/M2 | SYSTOLIC BLOOD PRESSURE: 105 MMHG

## 2024-08-30 DIAGNOSIS — T45.1X5A CHEMOTHERAPY INDUCED DIARRHEA: Primary | ICD-10-CM

## 2024-08-30 DIAGNOSIS — K52.1 CHEMOTHERAPY INDUCED DIARRHEA: ICD-10-CM

## 2024-08-30 DIAGNOSIS — C20 ADENOCARCINOMA OF RECTUM: Chronic | ICD-10-CM

## 2024-08-30 DIAGNOSIS — Z45.2 ENCOUNTER FOR FITTING AND ADJUSTMENT OF VASCULAR CATHETER: Primary | ICD-10-CM

## 2024-08-30 DIAGNOSIS — D68.51 HETEROZYGOUS FACTOR V LEIDEN MUTATION: ICD-10-CM

## 2024-08-30 DIAGNOSIS — T45.1X5A CHEMOTHERAPY INDUCED DIARRHEA: ICD-10-CM

## 2024-08-30 DIAGNOSIS — R19.7 INTRACTABLE DIARRHEA: ICD-10-CM

## 2024-08-30 DIAGNOSIS — K52.1 CHEMOTHERAPY INDUCED DIARRHEA: Primary | ICD-10-CM

## 2024-08-30 DIAGNOSIS — C20 ADENOCARCINOMA OF RECTUM: Primary | ICD-10-CM

## 2024-08-30 LAB
ALBUMIN SERPL-MCNC: 3.6 G/DL (ref 3.5–5.2)
ALBUMIN/GLOB SERPL: 1.3 G/DL
ALP SERPL-CCNC: 75 U/L (ref 39–117)
ALT SERPL W P-5'-P-CCNC: 28 U/L (ref 1–33)
ANION GAP SERPL CALCULATED.3IONS-SCNC: 9 MMOL/L (ref 5–15)
AST SERPL-CCNC: 21 U/L (ref 1–32)
BASOPHILS # BLD AUTO: 0.01 10*3/MM3 (ref 0–0.2)
BASOPHILS NFR BLD AUTO: 0.2 % (ref 0–1.5)
BILIRUB SERPL-MCNC: 0.2 MG/DL (ref 0–1.2)
BUN SERPL-MCNC: 9 MG/DL (ref 6–20)
BUN/CREAT SERPL: 13 (ref 7–25)
CALCIUM SPEC-SCNC: 8.7 MG/DL (ref 8.6–10.5)
CHLORIDE SERPL-SCNC: 107 MMOL/L (ref 98–107)
CO2 SERPL-SCNC: 26 MMOL/L (ref 22–29)
CREAT SERPL-MCNC: 0.69 MG/DL (ref 0.57–1)
DEPRECATED RDW RBC AUTO: 45.8 FL (ref 37–54)
EGFRCR SERPLBLD CKD-EPI 2021: 109.2 ML/MIN/1.73
EOSINOPHIL # BLD AUTO: 0.15 10*3/MM3 (ref 0–0.4)
EOSINOPHIL NFR BLD AUTO: 2.7 % (ref 0.3–6.2)
ERYTHROCYTE [DISTWIDTH] IN BLOOD BY AUTOMATED COUNT: 13.6 % (ref 12.3–15.4)
GLOBULIN UR ELPH-MCNC: 2.7 GM/DL
GLUCOSE SERPL-MCNC: 104 MG/DL (ref 65–99)
HCT VFR BLD AUTO: 41.4 % (ref 34–46.6)
HGB BLD-MCNC: 13.7 G/DL (ref 12–15.9)
IMM GRANULOCYTES # BLD AUTO: 0.03 10*3/MM3 (ref 0–0.05)
IMM GRANULOCYTES NFR BLD AUTO: 0.5 % (ref 0–0.5)
LYMPHOCYTES # BLD AUTO: 1.23 10*3/MM3 (ref 0.7–3.1)
LYMPHOCYTES NFR BLD AUTO: 22.4 % (ref 19.6–45.3)
MAGNESIUM SERPL-MCNC: 2 MG/DL (ref 1.6–2.6)
MCH RBC QN AUTO: 31.2 PG (ref 26.6–33)
MCHC RBC AUTO-ENTMCNC: 33.1 G/DL (ref 31.5–35.7)
MCV RBC AUTO: 94.3 FL (ref 79–97)
MONOCYTES # BLD AUTO: 0.5 10*3/MM3 (ref 0.1–0.9)
MONOCYTES NFR BLD AUTO: 9.1 % (ref 5–12)
NEUTROPHILS NFR BLD AUTO: 3.57 10*3/MM3 (ref 1.7–7)
NEUTROPHILS NFR BLD AUTO: 65.1 % (ref 42.7–76)
NRBC BLD AUTO-RTO: 0 /100 WBC (ref 0–0.2)
PLATELET # BLD AUTO: 209 10*3/MM3 (ref 140–450)
PMV BLD AUTO: 8.6 FL (ref 6–12)
POTASSIUM SERPL-SCNC: 3.7 MMOL/L (ref 3.5–5.2)
PROT SERPL-MCNC: 6.3 G/DL (ref 6–8.5)
RBC # BLD AUTO: 4.39 10*6/MM3 (ref 3.77–5.28)
SODIUM SERPL-SCNC: 142 MMOL/L (ref 136–145)
WBC NRBC COR # BLD AUTO: 5.49 10*3/MM3 (ref 3.4–10.8)

## 2024-08-30 PROCEDURE — 85025 COMPLETE CBC W/AUTO DIFF WBC: CPT | Performed by: NURSE PRACTITIONER

## 2024-08-30 PROCEDURE — 80053 COMPREHEN METABOLIC PANEL: CPT | Performed by: NURSE PRACTITIONER

## 2024-08-30 PROCEDURE — 83735 ASSAY OF MAGNESIUM: CPT

## 2024-08-30 PROCEDURE — 25810000003 SODIUM CHLORIDE 0.9 % SOLUTION: Performed by: NURSE PRACTITIONER

## 2024-08-30 PROCEDURE — 96360 HYDRATION IV INFUSION INIT: CPT

## 2024-08-30 PROCEDURE — 25010000002 HEPARIN LOCK FLUSH PER 10 UNITS: Performed by: INTERNAL MEDICINE

## 2024-08-30 RX ORDER — HEPARIN SODIUM (PORCINE) LOCK FLUSH IV SOLN 100 UNIT/ML 100 UNIT/ML
500 SOLUTION INTRAVENOUS AS NEEDED
Status: DISCONTINUED | OUTPATIENT
Start: 2024-08-30 | End: 2024-08-30 | Stop reason: HOSPADM

## 2024-08-30 RX ORDER — SODIUM CHLORIDE 9 MG/ML
1000 INJECTION, SOLUTION INTRAVENOUS ONCE
Status: COMPLETED | OUTPATIENT
Start: 2024-08-30 | End: 2024-08-30

## 2024-08-30 RX ORDER — HEPARIN SODIUM (PORCINE) LOCK FLUSH IV SOLN 100 UNIT/ML 100 UNIT/ML
500 SOLUTION INTRAVENOUS AS NEEDED
OUTPATIENT
Start: 2024-08-30

## 2024-08-30 RX ORDER — LEVOFLOXACIN 500 MG/1
500 TABLET, FILM COATED ORAL DAILY
Qty: 10 TABLET | Refills: 0 | Status: SHIPPED | OUTPATIENT
Start: 2024-08-30

## 2024-08-30 RX ORDER — ONDANSETRON 8 MG/1
TABLET, FILM COATED ORAL
Qty: 60 TABLET | Refills: 1 | Status: SHIPPED | OUTPATIENT
Start: 2024-08-30

## 2024-08-30 RX ORDER — METOPROLOL SUCCINATE 25 MG/1
12.5 TABLET, EXTENDED RELEASE ORAL NIGHTLY
Qty: 45 TABLET | Refills: 2 | Status: SHIPPED | OUTPATIENT
Start: 2024-08-30

## 2024-08-30 RX ORDER — SODIUM CHLORIDE 0.9 % (FLUSH) 0.9 %
10 SYRINGE (ML) INJECTION AS NEEDED
Status: DISCONTINUED | OUTPATIENT
Start: 2024-08-30 | End: 2024-08-30 | Stop reason: HOSPADM

## 2024-08-30 RX ORDER — SODIUM CHLORIDE 0.9 % (FLUSH) 0.9 %
10 SYRINGE (ML) INJECTION AS NEEDED
OUTPATIENT
Start: 2024-08-30

## 2024-08-30 RX ADMIN — Medication 10 ML: at 14:01

## 2024-08-30 RX ADMIN — Medication 500 UNITS: at 14:01

## 2024-08-30 RX ADMIN — SODIUM CHLORIDE 1000 ML: 9 INJECTION, SOLUTION INTRAVENOUS at 12:33

## 2024-08-30 NOTE — PROGRESS NOTES
The pt reports her port is already access and that it doesn't give blood return. The pt request labs drawn using her arm. Called scheduling to add pt onto the lab chair.

## 2024-08-30 NOTE — PROGRESS NOTES
REASON FOR FOLLOW UP:     Rectal cancer, rectal ultrasound examination in July 2019 reported T3N0 disease without lymphadenopathy. She does have small pulmonary nodule 6-7 mm and 2 mm with indeterminate feature. There is no solid evidence of metastatic disease otherwise. Patient has stage IIA (T3N0M0) disease.  The patient is heterozygous factor V Leiden, was on prophylactic anticoagulation with Lovenox 40 mg daily given her increased risk of thrombosis with MediPort and GI malignancy.   PET scan on 8/8/2019 reported an 8 mm hypermetabolic right upper lobe pulmonary nodule, which is suspicious for metastatic as well.    Patient had a PowerPort placement on the left upper chest by Dr. Joseph on 8/9/2019.  Patient was started on concurrent infusional 5-FU chemoradiation therapy on 8/12/2019, with planned complete surgical resection and further adjuvant chemotherapy with intention to cure the disease.   Patient underwent surgical resection of the primary rectal cancer by Dr. Ye on 12/2/2019.  Pathology evaluation reported residual T3N1 disease stage IIIb.  Diarrhea related to therapy and radiation.   Patient was started cycle 1 day 1 of adjuvant FOLFOX 6 on 1/23/2020.  On 2/5/2020 FOLFOX 6 cycle 2 delayed secondary to neutropenia.  Patient was given 3 days of Granix injection.  After cycle #2 we planned 3 days of Granix with each cycle.   Patient also intolerant of oral iron.  Patient received 2 doses of IV injectafer on 02/05/2020 and 02/12/2020.   02/12/2020 Proceed with cycle #2 of FOLFOX 6 with 3 days of Granix.  On 3/11/2020 cycle 4 postponed secondary to abdominal pain and occasional low-grade fevers.  CT scans ordered.  Cycle #4 resumed after CT scan revealed no evidence of disease.  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 Ye. patient has no fever.  Continue to observe.   Grade 3 hand-foot syndrome secondary to 5-FU after cycle #7  FOLFOX 6 chemotherapy, prompting ER visit.  Also has worsening peripheral neuropathy.   Cycle #8 FOLFOX 6 was given on 5/13/2020, with 50% dose reduction for both 5-FU and oxaliplatin, and also examination of bolus 5-FU.   PET scan examination on 5/21/2020 reported no evidence of metastatic disease in the chest abdomen pelvis.  Stable 6 mm RUL pulmonary nodule.  On 5/27/2020, I discussed with the patient that we can discontinue chemotherapy at this time.   Patient had a surgical procedure for low anterior colon resection, coloanal anastomosis on 8/3/2020.  CT scan for chest abdomen pelvis on 9/9/2020 reported a new 8 mm noncalcified pulmonary nodule in the left lower lobe of the lung.  Otherwise stable right upper lobe 6 mm pulmonary nodule, and no evidence of disease recurrence in the abdomen or pelvis.  PET scan examination on 9/18/2020 reported multiple hypermetabolic small pulmonary nodules/ new pulmonary nodules.   Patient was started on pelvic chemotherapy with FOLFIRI regimen on 10/13/2020.   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 It reported tumor mutation burden 5 Muts/Mb, microsatellite stable, TP53 mutation [id number], and others.   Cycle #5 was without irinotecan, due to peripheral neuropathy.  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  Patient was switched to weight-based Lovenox injection.  Cycle #6 5-FU and irinotecan was delayed by 2 weeks because of the above incident.  Patient had a telemedicine evaluation at that the [facility] [place] in February 2021.  They agreed with our treatment plan for palliative chemotherapy followed by maintenance chemotherapy.    PET scan examination on 4/6/2021 after cycle #12 palliative chemotherapy reported no evidence of hypermetabolic metastatic lesion.   Patient was started on maintenance chemotherapy with 5-FU and  Avastin on 4/13/2021. (Unable to tolerate Xeloda because of a significant hand-foot syndrome).   PET scan on 9/24/2021 obtained after cycle #12 maintenance chemotherapy with 5-FU, leucovorin, Avastin reported no evidence for active disease. We recommended 3 months chemotherapy holiday.  CT scan examination on 3/15/2022 reported slightly disease progression with increase in size of small pulmonary nodule.  We started patient back on maintenance chemotherapy on 3/29/2022 treatment with 5-FU plus leucovorin and Avastin, repeating every 2 weeks.  After 6 more maintenance chemotherapy, CT scan for chest abdomen pelvis was done on 6/21/2022 which reported stable sub-6 mm pulmonary nodules.  Patient had last maintenance chemotherapy on 6/7/2022.       HISTORY OF PRESENT ILLNESS:  The patient is a 44 y.o. female with the above-mentioned history, who returns the office today for short interval follow-up.  She was last evaluated on Wednesday, 8/28/2024 with intractable diarrhea.  She received IV fluid support.  Stool studies were negative for infectious etiology, negative C. difficile and negative stool for PCR.  She was treated with Flagyl given chronic colitis seen on most recent PET scan.  Yesterday, due to ongoing diarrhea she did receive a single dose of octreotide.  Through this all she has been maximizing Lomotil and Imodium.    Reports today, 8/30/2024 she is slightly improved.  She reports her bowels have slowed, she is having to strain some to pass the soft stool.  She is no longer having abdominal pain or fevers.  She is tolerating the Flagyl.  She has no nausea vomiting.      Past Medical History:   Diagnosis Date    Abdominopelvic abscess 08/12/2020    ADMITTED TO [facility]    Abnormal Pap smear of cervix 02/02/1998    JULIUS I    Abscess of abdominal wall 11/28/2012    SEEN AT [facility] ER    Anemia in pregnancy     Anxiety     Bilateral epiphora 04/2020    Bilateral hand swelling 05/02/2020    SEEN AT [facility] ER    Cervical  lymphadenitis 06/06/2012    SEEN AT [facility] ER    Chemotherapy induced diarrhea 01/2021    Chemotherapy induced neutropenia 04/2020    Chemotherapy-induced nausea 02/2020    Chemotherapy-induced thrombocytopenia 05/2020    Chronic anticoagulation     Chronic diarrhea     Colon polyps     FOLLOWED BY DR. 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 ALEXANDRU HUNT    History of gestational diabetes     History of pre-eclampsia 1998    History of pre-eclampsia     History of radiation therapy 2019    FOLLOWED BY DR. JAVON LEWIS    History of TB skin testing 01/17/2009    TB Skin Test    History of TB skin testing 01/17/2009    TB Skin Test    History of transfusion 2019    12/2019    HPV in female 1998    Hypokalemia 09/2019    Hypomagnesemia 09/2019    Hyponatremia 06/2021    IBS (irritable bowel syndrome)     Ileostomy present 04/25/2020    Take down on 11/3/2022    Infected insect bite of neck 05/27/2016    SEEN AT [facility]    Kidney stones 08/09/2007    SEEN AT [facility] ER    Low anterior resection syndrome 12/2022    FOLLOWED BY DR. 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    Perirectal abscess 08/12/2020     Pulmonary embolism without acute cor pulmonale 09/20/2019    X 3; ADMITTED TO [facility]    Pulmonary nodule, right 2020    Rectal cancer 2019    STAGE IIA, INVASIVE MODERATELY DIFFERENTIATED ADENOCARCINOMA, CHEMO AND XRT FINISHED 2019    Right shoulder pain 2020    SEEN AT [facility] ER    Right ureteral stone 10/01/2019    SEEN AT [facility] ER    SAB (spontaneous ) 1996     A2-1 INDUCED    Sciatica     Sepsis due to cellulitis 2002    LEFT GREAT TOE, ADMITTED TO [facility]    Tachycardia 2020    Thrombosis of superior vena cava 2020    AND BRACHIOCEPHALIC VEIN, ADMITTED TO [facility]    Urinary urgency 2020   Patient had COVID-19 infection diagnosed on 2022 despite was fully vaccinated and boosted.  She recovered without problem.      Past Surgical History:   Procedure Laterality Date    ABDOMINAL SURGERY      CHOLECYSTECTOMY N/A 10/10/2003    LAPAROSCOPIC WITH CHOLANGIOGRAM, DR. JAMEY TALAVERA AT [facility]    COLON RESECTION N/A 2019    Procedure: laparoscopic low anterior colon resection with mobilization of splenic flexure and diverting loop ileostomy: ERAS;  Surgeon: Padmaja Esparza MD;  Location: [facility];  Service: General    COLON RESECTION N/A 2020    Procedure: LOW ANTERIOR COLON RESECTION, COLOANAL ANASTOMOSIS, MOBILIZATION SPLENIC FLEXURE;  Surgeon: Padmaja Esparza MD;  Location: [facility];  Service: General;  Laterality: N/A;    COLONOSCOPY N/A 07/15/2020    PATENT ANASTAMOSIS IN MID RECTUM, RESCOPE IN 1 YR, DR. PADMAJA ESPARZA AT [facility]    COLONOSCOPY N/A 2022    DIFFUSE AREA OF MODERATELY FRIABLE MUCOSA IN ENTIRE COLON , DIVERSION COLITIS, PATENT AND HEALTHY ANASTAMOSIS, DR. PADMAJA ESPARZA AT [facility]    COLONOSCOPY N/A 2023    6 MM SESSILE SERRATED ADENOMA POLYP IN DESCENDING, PATENT ANASTAMOSIS IN DISTAL RECTUM, RESCOPE IN 2 YRS, DR. PADMAJA ESPARZA AT [facility]    COLONOSCOPY W/ POLYPECTOMY N/A 2019    15 MM TUBULOVILLOUS ADENOMA POLYP IN HEPATIC FLEXURE,  20 MMTUBULOVILLOUS ADENOMA WITH HIGH GRADE DYSPLASIA IN RECTOSIGMOID, 4 CM MASS IN MID RECTUM, PATH: INVASIVE MODERATELY DIFFERENTIATED ADENOCARCINOMA, DR. JENNIFER LI AT [facility]    DILATATION AND EVACUATION N/A 2009    ENDOSCOPY N/A 07/08/2019    LA GRADE A ESOPHAGITIS, GASTRITIS, ALL BIOPSIES BENIGN, DR. JENNIFER LI AT [facility]    ILEOSTOMY CLOSURE N/A 11/14/2022    Procedure: ILEOSTOMY TAKEDOWN;  Surgeon: Geronimo Ye MD;  Location: [facility];  Service: General;  Laterality: N/A;    INCISION AND DRAINAGE PERIRECTAL ABSCESS N/A 08/14/2020    Procedure: INCISION AND DRAINAGE OF retrorectal dehiscence abcess with drain placement and irrigation;  Surgeon: Geronimo Ye MD;  Location: [facility];  Service: General;  Laterality: N/A;    PAP SMEAR N/A 01/24/2014    SIGMOIDOSCOPY N/A 07/24/2019    INFILTRATIVE PARTIALLY OBSTRUCTING LARGE RECTAL CANCER, AREA TATOOED, DR. GERONIMO YE AT [facility]    SIGMOIDOSCOPY N/A 11/23/2019    AN ULCERATED PARTIALLY OBSTRUCTING MASS IN MID RECTUM, AREA TATTOOED, DR. GERONIMO YE AT [facility]    SIGMOIDOSCOPY N/A 07/22/2021    PATENT ANASTAMOSIS IN DISTAL RECTUM, RESCOPE IN 1 YR, DR. GERONIMO YE AT [facility]    TRANSRECTAL ULTRASOUND N/A 07/24/2019    Procedure: ULTRASOUND TRANSRECTAL;  Surgeon: Geronimo Ye MD;  Location: [facility];  Service: General    URETEROSCOPY LASER LITHOTRIPSY WITH STENT INSERTION Right 10/30/2019    DR. ESTUARDO BEASLEY AT [facility]    VAGINAL DELIVERY N/A 09/18/1998    VENOUS ACCESS DEVICE (PORT) INSERTION Left 08/09/2019    Procedure: INSERTION VENOUS ACCESS DEVICE;  Surgeon: Sj Joseph MD;  Location: [facility];  Service: General    VENOUS ACCESS DEVICE (PORT) INSERTION N/A 12/18/2020    Procedure: INSERTION VENOUS ACCESS DEVICE right side, removal venous access device left side;  Surgeon: Sj Joseph MD;  Location: [facility];  Service: General;  Laterality: N/A;    WISDOM TOOTH EXTRACTION Bilateral  1993       HEMATOLOGIC/ONCOLOGIC HISTORY:      The patient reports she has intermittent rectal bleeding and urgency, mucous with her stool, starting sometime in 2016. At that time she was referred to GI service, and was diagnosed of irritable bowel syndrome. Nevertheless she had worsening urgency for bowel movement, and had a couple of incidents losing control of stool. She was recently seen by Roland Thorpe MD again and had colonoscopy and EGD exam on 07/08/2019. She was found having a circumferential rectal mass. According to the procedure note, the patient had a fungating circumferential bleeding 4 cm mass in the middle rectum, at distance between 13 cm and 17 cm from the anus. Mass was causing partial obstruction. There were also colon polyps found at the hepatic flexure and also at the rectosigmoid junction 23 cm from the anus. Both were resected and retrieved. EGD examination reported grade A esophagitis at the GE junction and patchy discontinuous erythema and aggravation of the mucosa without bleeding in the stomach body. There is normal mucosa of the duodenum. Pathology evaluation from this procedure reported moderately differentiated adenocarcinoma involving the rectal mass. The rectal sigmoid junction polyp was tubular/tubulovillous adenoma with high grade dysplasia negative for invasive malignancy. The hepatic flexure polyp was also tubular/tubulovillous adenoma negative for high grade dysplasia or malignancy. The biopsy from the esophagus reports squamocolumnar mucosa with inflammatory changes suggestive of mild reflux esophagitis, negative for interstitial metaplasia. Gastric biopsy was benign and duodenal biopsy was also benign. There is no mention of H-pylori.     Patient was subsequently referred to colorectal surgeon Padmaja Ye MD for further evaluation. The patient had CT scan examination for chest, abdomen and pelvis requested by Dr. Ye and were done on 07/13/2019. The study reported no  evidence of lymphadenopathy in the abdomen and pelvis. There was wall thickening of the rectosigmoid junction. Normal spleen, pancreas, adrenal glands and kidneys. There was fatty liver infiltration but no focal lymphatic lesions. There was a small 6-7 mm noncalcified nodule in the right upper lobe and a tiny 3 mm subpleural nodule in the right middle lobe. No mediastinal or hilar lymphadenopathy.     Dr. Ye performed staging rectal ultrasound examination. This study reported tumor penetrating through the muscularis propria as T3 disease; there were no lymph nodes identified.    She had a hospitalization in late September 2019 because of newly discovered pulmonary emboli.  She was on prophylactic Lovenox prior to the incident of PE.  Now she is on full dose Xarelto anticoagulation.  Patient reports no further chest pain dyspnea.  She denies bleeding or bruising.  During her hospitalization, she was seen by Dr. Ye, who plans to have surgery 8 to 12 weeks after finishing radiation therapy.  She finished her radiation on 9/19/2019.     I noticed patient also presented to the emergency room on 10/1/2019 complaining of right flank area pain.  I reviewed the images studies and indeed she has a very small 1 to 2 mm obstructing kidney stone in the UV junction.  Patient is still symptomatic with some pains and dysuria.  She denies fever sweating or chills.    Laboratory study on 10/7/2019 reported normal CBC including hemoglobin 13.1, platelets 301,000, WBC 6170 and ANC 4900 lymphocytes 590 monocytes 480.      The nurse reported malfunction of port-a-catheter on 10/7/2019.  Port study on 10/8/2019 showed fibrin sheath around the distal aspect of the Mediport catheter in the SVC. This does not appear to hinder injection, but does prevent aspiration at this time.    Patient underwent surgical resection of the colon on 12/2/2019 with Dr. Ye.  Pathology evaluation reported residual T3 disease, also 1 out of 14 lymph  nodes positive for malignancy.  So this patient in either had at least stage IIIb disease (T3 N1 M0?).      Adjuvant chemotherapy FOLFOX 6 will be started on 1/22/2020.  Laboratory study reported iron saturation 10%, free iron 31 TIBC 319 and ferritin 168.  Her hemoglobin was 11.8, WBC 5600, and platelets 347,000.    Patient was here on 02/12/2020 for cycle 2 of her FOLFOX.  This is delayed x1 week secondary to neutropenia.  The patient ultimately received 3 days worth of Neupogen with recovery of her blood counts.  Of note, the patient struggled with significant nausea without any episodes of vomiting following cycle #1 of chemotherapy resulting in significant weight loss.  She is up 12 pounds over the last week as her appetite has normalized.  We will add Emend to her care plan.    She is having several loose stools per her colostomy and has been hesitant to take Imodium due to prior history of constipation.  I encouraged her to try this with a maximum of 8 tablets/day.  She denies any infectious symptoms including fevers or chills.  No excess bleeding or bruising noted.  She had the expected cold sensitivity related to the Oxaliplatin for about 3 days following treatment.    Labs from 02/12/2020 demonstrated total white blood cell count of 5.14, ANC of 3.06, hemoglobin of 11.2, platelets of 211,000.  She was found to be iron deficient last week and is intolerant to oral iron secondary to GI distress.  For this reason, she initiated IV iron therapy with Injectafer last week.  She had received her second dose 02/12/2020    Patient has normalized hemoglobin 12.2 on 2/26/2020.    She reported on 5/5/2020 she had a recent visit to the emergency department for what was suspected to be an allergic reaction.  She called our on-call service on Saturday with reports of hand and face swelling.  She was instructed to proceed to the emergency department at which time she was assessed and prescribed a Medrol Dosepak.  She had  just completed her 5-FU and was unhooked on Friday, 5/3/2020.  Her symptoms have improved.  She does report persistent hyperpigmentation and mild swelling of the palms of the hands but this is much improved.  It was her right hand specifically that was swollen.  Her facial swelling has resolved.  She continues on Cefdinir nd since has 1 day remaining, she was prescribed cefdinir for a UTI requiring hospitalization at the end of April.  Reports no new symptoms.  Her labs are stable.  She is scheduled for treatment again.    Patient states at this time she is not tolerating her chemotherapy well.     Patient seen in the emergency department on 5/2/2020 for what was suspected to be an allergic reaction.  She called our on-call service on Saturday explaining that she was experiencing hand and face swelling.  She was instructed to proceed to the emergency department at which time she was assessed and prescribed a Medrol Dosepak.  She had just completed her 5-FU and was unhooked on Friday, 5/1/2020.      She reports since her ED visit on 5/2/2020 her symptoms have not improved. Her hands and feet were swollen upon presenting to the ED and she could barely make a fist. She states that she feels so swollen she is not able to stand it. She states that her skin on the hands and feet are peeling extensively as well, besides changing color to more dark.     She also reports significant fatigue after her first week of the 5-FU treatment but she expected this side effect. She also notices significant increase in her neuropathy to the point that she is not able to even walk around in her home without her house slippers due to irritation from her rugs. She denies and associated nausea or vomiting at this time. She also has episodes of epistaxis every day after the chemotherapy cycle #7.  She does report working full-time during the week of chemotherapy.    Laboratory studies, 5/13/2020, show moderate thrombocytopenia platelets  123,000, low normal WBC 4140 including ANC 2720 and normal hemoglobin 13.4.  Because significant hand-foot syndrome, will decrease both 5-FU and oxaliplatin by 50%, and eliminate bolus dose of 5-FU.    On 5/21/2020 patient had a PET scan performed which showed no convincing evidence of residual disease in abdomen or pelvis, no metastatic disease within the chest or neck.     Cycle #8 FOLFOX 6 was given on 5/13/2020, with 50% dose reduction for both 5-FU and oxaliplatin, and also examination of bolus 5-FU.  She states on 5/27/2020 that with the recent reduction of the chemotherapy she feels significantly better. She has more energy and is able to do her daily routine.      PET scan examination on 5/21/2020 reported no evidence of metastatic disease in chest abdomen pelvis.  There was a stable 6 mm right upper lobe pulmonary nodule.    Laboratory studies on 5/27/2020 showed mild leukocytopenia WBC 3720 but a normal ANC 2250 and lymphocytes 630.  Normal platelets 163,000 and hemoglobin 12.6.  Chemistry lab reported normal renal function, liver function panel and a electrolytes, elevated glucose 164.    Laboratory studies 6/24/2020, showed normal hemoglobin 13.4 but macrocytosis .9.  Normal platelets 210,000 and WBC 5870.  She had normal CMP.     Patient last time was here in late June 2020.  Since that time she had surgical procedure for low anterior colon resection, coloanal anastomosis on 8/3/2020.  She later developed a perirectal abscess, required surgical drainage on 8/14/2020.  She was discharged on 8/27/2020.    Patient reports to me she still has lower abdominal wall vacuum suction in place.  She denies fever sweating chills.  Performance status is ECOG 1.  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  She does have visiting nurse come to the home for wound care.    CT scan for chest abdomen pelvis on 9/9/2020 reported a new 8 mm noncalcified pulmonary nodule in the left lower  lobe.  Otherwise stable right upper lobe 6 mm pulmonary nodule, and no evidence of disease recurrence in the abdomen or pelvis.     Laboratory study on 9/16/2020 reported elevated AST 55, ALT 95, alk phosphatase 143 but normal total bilirubin 0.3.  Chemistry lab otherwise unremarkable.  She has completed normal CBC.      Because of the abnormal CT scan examination for chest abdomen pelvis on 9/9/2020 reported a new 8 mm noncalcified pulmonary nodule in the left lower lobe, we obtained a PET scan examination for further evaluation, which was done on 9/18/2020.  This study reported several pulmonary nodules, some of them are hypermetabolic, newly developed compared to previous PET scan in May 2020.  Certainly does highly suspicious for metastatic disease.  There are also hypermetabolic activity in the abdomen and pelvic area which is hard to differentiate from surgical scar versus metastatic malignancy.    Laboratory study on 10/13/2020 reported normal CBC with Hb 13.9, platelets 302,000 and WBC 5520 including ANC 3830.  Chemistry lab reported normalized AST 20, alk phosphatase 116 improved ALT 35, and maintains normal bilirubin, with normal electrolytes and liver function panel.     Patient was started on first cycle of palliative chemotherapy FOLFIRI on 10/13/2020.    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  Patient had a new port placed 12/18/2020, and had held her Xarelto for 2 days prior to surgery.  She presented to the ER on 12/21/20 with complaints of facial and arm swelling for 3 days.  She also noted increased shortness of breath.  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  Thrombus within the right internal jugular vein cannot be excluded.  Patient was started on IV heparin, and eventually transitioned to  weight-based Lovenox, which she now continues.    PET scan examination on 9/24/2021 reported further shrinking of the tiny right upper lobe pulmonary nodule.  Otherwise no new lesions.  No evidence for metastatic disease in other areas.      Patient had CT scan for chest with IV contrast obtained on 12/14/2021 which reported small tiny stable pulmonary nodules. There is a 4 mm right upper lobe pulmonary nodule. There is also a stable 4 mm left lower lobe pulmonary nodule. There is also stable left upper lobe micronodule.     Laboratory studies today on 12/21/2021 reported normal hemoglobin 15.9 however .0, platelets 218,000 WBC 5360 including neutrophils 3730 lymphocytes 980. Chemistry lab reported normal renal function, electrolytes, glucose, and marginally elevated ALT 55, AST 34 but normal total bilirubin and alk phosphatase.     CT scan for chest abdomen pelvis 6/21/2022 reported sub-6 mm pulmonary nodules either stable or slightly smaller.  No new pulmonary nodules or evidence for disease progression.  There is no evidence for metastatic disease in the liver however it shows diffuse hepatic steatosis.  There was masslike thickening in the presacral space with the clips or calcification approximately 1.7 cm in greatest AP dimension but stable.    Laboratory study on 9/20/2022 reported normal hemoglobin 15.7 with mild macrocytosis .1.  She has normal CBC and platelets.  She also has unremarkable CMP.  Normal CEA 1.13 ng/mL.    Patient was seen by Dr. Ye, who performed ileostomy takedown on 11/14/2022.  Patient reports that she is recovering.  She still has a small open wound less than 1 cm in diameter, however close to 2 cm deep.  She has been changing dressing herself.  She denies fever sweating chills.    Patient has no other specific complaints.  She has excellent performance status ECOG 0.  She denies chest pain dyspnea cough hemoptysis.  No abdominal pain.  No melena hematochezia.  Patient  has been eating well, stable weight.  She works full-time.    She continues Lovenox injections and denies any significant bleeding issues.   No other new problems or concerns.     CT scan for chest abdomen pelvis obtained on 1/10/2023 reported stable small pulmonary nodules, no evidence for active or new disease.    Laboratory study on 1/10/2023 reported normal CBC and normal CMP.  CEA level is 0.99 ng/mL.    CT scan for chest, abdomen, and pelvis on 7/7/2023 reported stable small pulmonary nodules including a left upper lobe 5 mm nodule. There were no new masses, or pleural effusion. No enlarged supraclavicular, axillary, mediastinal, or hilar lymphadenopathy. Mediastinal vasculature normal. There is occlusion of the superior vena around the catheter with body wall  is present. There was no evidence for disease recurrence in the abdomen or pelvis. Bone is also negative for metastatic disease.    Laboratory studies obtained on 07/07/2023 also reported normal CBC, and normal CMP as well as low level CEA 1.07 ng/mL.    The CT scan for the chest on 10/27/2023 reported enlarging pulmonary nodules bilaterally. The right upper lobe lung nodule increased from 7 x 7 mm to 9 x 8 mm, and the left upper lobe nodule measured 8 x 9 mm from 5 x 6 mm in 04/2023. There is a different left upper lobe nodule 6 x 8 mm from previous 5 x 5 mm. The presacral tissue thickness measures up to 2.3 cm.    A laboratory study on 10/27/2023 reported CEA 1.58 ng/mL, normal CBC, and CMP.  Molecular study of peripheral blood Guardant Reveal is still pending.    The patient presents today on 11/17/2023 for reevaluation to discuss the results of PET scan examination as well as the results of tumor conference. I saw her recently on 11/03/2023 and because of enlarging pulmonary nodules on the CT scan, we requested a PET scan examination. I presented her case yesterday on 11/16/2023 at the [facility].    The patient reports  she is at her baseline condition as 2 weeks ago. No specific complaints, no chest pain, dyspnea, cough, etc. She has a regular bowel movement.    Her case was present at the [facility]. on 11/16/2023. The PET scan images and chest CT scan were reviewed in conjunction with her treatment history. The consensus was for patient to receive stereotactic radiation therapy for the right upper lobe lung lesion, and the bigger left upper lobe lesion, and monitor the smaller left upper lobe lesion with further images studies down the line. Also, the conference recommended Guardant Reveal study which we already ordered.     This Guardant Reveal study came back today as negative for ctDNA 0%.        CT of the chest on 2/2/2024 reported shrinking of the right upper lobe lesion from 9 mm to 6 mm, and the left upper lobe lesion also decreased from 9 mm to 6 mm. Otherwise, there are a couple of stable pulmonary nodules, 7 to 8 mm. The right hilar has a small lymph node that has increased from 6 mm to 8 mm and is otherwise stable. There was no suspicious lesion in the abdomen or pelvis.      MEDICATIONS    Current Outpatient Medications:     clonazePAM (KlonoPIN) 1 MG tablet, Take 1 tablet as needed daily for anxiety. May take one additional as needed for severe anxiety., Disp: 45 tablet, Rfl: 4    Cyanocobalamin (VITAMIN B-12 PO), Take 1 tablet by mouth Every Other Day., Disp: , Rfl:     dicyclomine (BENTYL) 20 MG tablet, TAKE 1 TABLET BY MOUTH EVERY 6 (SIX) HOURS AS NEEDED FOR IRRITABLE BOWEL SYMPTOMS, Disp: 360 tablet, Rfl: 2    diphenoxylate-atropine (LOMOTIL) 2.5-0.025 MG per tablet, TAKE 2 TABLETS BY MOUTH 4 TIMES A DAY, Disp: 240 tablet, Rfl: 0    Enoxaparin Sodium (LOVENOX) 100 MG/ML solution prefilled syringe syringe, Inject 1 mL under the skin into the appropriate area as directed Every 12 (Twelve) Hours., Disp: 60 mL, Rfl: 2    escitalopram (LEXAPRO) 10 MG tablet, Take 1 tablet by mouth Daily., Disp: 90  tablet, Rfl: 1    famotidine (PEPCID) 20 MG tablet, TAKE 1 TABLET BY MOUTH TWICE A DAY, Disp: 180 tablet, Rfl: 2    Imvexxy Maintenance Pack 10 MCG insert, Insert 10 mcg into the vagina 2 (Two) Times a Week., Disp: , Rfl:     Loperamide HCl (IMODIUM PO), Take  by mouth As Needed., Disp: , Rfl:     Loratadine 10 MG capsule, Take 1 capsule by mouth Every Evening., Disp: , Rfl:     metoprolol succinate XL (TOPROL-XL) 25 MG 24 hr tablet, TAKE 0.5 TABLETS BY MOUTH EVERY NIGHT, Disp: 45 tablet, Rfl: 2    metroNIDAZOLE (FLAGYL) 500 MG tablet, Take 1 tablet by mouth 3 (Three) Times a Day., Disp: 30 tablet, Rfl: 0    nystatin-hydrocortisone-bacitracin in zinc oxide ointment, Apply  topically to the appropriate area as directed Daily., Disp: 60 g, Rfl: 2    ondansetron (ZOFRAN) 8 MG tablet, TAKE 1 TABLET BY MOUTH THREE TIMES A DAY AS NEEDED FOR NAUSEA AND VOMITING, Disp: 60 tablet, Rfl: 1    pantoprazole (Protonix) 40 MG EC tablet, Take 1 tablet by mouth Daily., Disp: 30 tablet, Rfl: 1    rosuvastatin (CRESTOR) 5 MG tablet, Take 1 tablet by mouth Daily., Disp: 90 tablet, Rfl: 0    colestipol (Colestid) 5 g granules, Take 5 g by mouth 2 (Two) Times a Day. (Patient not taking: Reported on 8/28/2024), Disp: 300 g, Rfl: 0    levoFLOXacin (Levaquin) 500 MG tablet, Take 1 tablet by mouth Daily., Disp: 10 tablet, Rfl: 0  No current facility-administered medications for this visit.    Facility-Administered Medications Ordered in Other Visits:     heparin injection 500 Units, 500 Units, Intravenous, PRN, Dong Nguyen MD PhD, 500 Units at 08/30/24 1401    sodium chloride 0.9 % flush 10 mL, 10 mL, Intravenous, PRN, Dong Nguyen MD PhD, 10 mL at 08/30/24 1401    ALLERGIES:   No Known Allergies    SOCIAL HISTORY:       Social History     Tobacco Use    Smoking status: Every Day     Current packs/day: 1.00     Average packs/day: 1 pack/day for 25.0 years (25.0 ttl pk-yrs)     Types: Electronic Cigarette, Cigarettes     Passive  "exposure: Current    Smokeless tobacco: Never    Tobacco comments:     1 PPD/caffeine use    Vaping Use    Vaping status: Some Days    Substances: Nicotine    Devices: Disposable    Passive vaping exposure: Yes   Substance Use Topics    Alcohol use: Not Currently     Comment: RARELY    Drug use: Never         FAMILY HISTORY:   Mother has positive factor V Leiden mutation, although she did not have thrombosis, mother also is coronary disease with stenting, she also is occasional bruising.    Maternal grandmother had DVT, she had multiple surgical procedures.    Patient mother's half-brother had metastatic colon cancer at diagnosis in his 50s.    Maternal great aunt had breast cancer.           Vitals:    08/30/24 1203   BP: 105/64   Pulse: 73   Temp: 98.2 °F (36.8 °C)   TempSrc: Oral   SpO2: 96%   Weight: 89.5 kg (197 lb 4.8 oz)   Height: 167.6 cm (65.98\")   PainSc: 0-No pain         8/29/2024    12:30 PM   Current Status   ECOG score 0     Physical Exam  Vitals reviewed.   Constitutional:       Appearance: Normal appearance. She is well-developed.   HENT:      Head: Normocephalic and atraumatic.      Comments:         Nose: Nose normal.   Eyes:      Conjunctiva/sclera: Conjunctivae normal.   Cardiovascular:      Rate and Rhythm: Normal rate and regular rhythm.      Heart sounds: Normal heart sounds.   Pulmonary:      Effort: Pulmonary effort is normal.      Breath sounds: Normal breath sounds.   Abdominal:      General: Bowel sounds are normal.      Palpations: Abdomen is soft. There is no mass.      Tenderness: There is no abdominal tenderness.   Musculoskeletal:      Right lower leg: No edema.      Left lower leg: No edema.   Neurological:      Mental Status: She is alert. Mental status is at baseline.   Psychiatric:         Mood and Affect: Mood normal.         RECENT LABS:  Results from last 7 days   Lab Units 08/30/24  1118 08/28/24  1417   WBC 10*3/mm3 5.49 6.38   NEUTROS ABS 10*3/mm3 3.57 4.28   HEMOGLOBIN " g/dL 13.7 15.5   HEMATOCRIT % 41.4 45.3   PLATELETS 10*3/mm3 209 199     Results from last 7 days   Lab Units 08/30/24  1118 08/28/24  1417   SODIUM mmol/L 142 138   POTASSIUM mmol/L 3.7 3.6   CHLORIDE mmol/L 107 102   CO2 mmol/L 26.0 21.9*   BUN mg/dL 9 10   CREATININE mg/dL 0.69 0.67   CALCIUM mg/dL 8.7 9.1   ALBUMIN g/dL 3.6 4.1   BILIRUBIN mg/dL 0.2 0.4   ALK PHOS U/L 75 92   ALT (SGPT) U/L 28 22   AST (SGOT) U/L 21 18   GLUCOSE mg/dL 104* 108*   MAGNESIUM mg/dL 2.0 2.1           IMAGING:  NM PET/CT Skull Base to Mid Thigh  F-18 FDG PET SKULL BASE TO MID THIGH WITH PET CT FUSION.     HISTORY: 44-year-old female with rectal cancer. Restaging.     TECHNIQUE: Radiation dose reduction techniques were utilized, including  automated exposure control and exposure modulation based on body size.   Blood glucose level at time of injection was 89 mg/dL. 6.8 mCi of F-18  FDG were injected and PET was performed from skull base to mid thigh. CT  was obtained for localization and attenuation correction. Time at  injection 8:00 a.m. PET start time 9:22 a.m. Normalization method:  patient weight. Compared with compared with PET/CT 11/08/2023, chest CT  08/02/2024, CTs of the chest abdomen pelvis 04/23/2024.     FINDINGS: Mediastinal blood pool has a maximal SUV of 2.4, previously  2.4.     1. There are new pulmonary nodules since the previous PET/CT and there  are nodules which have increased in size. A new 8 mm right upper lobe  nodule has an SUV of 3.7. An 8 mm nodule at the superior medial aspect  of the left lower lobe previously measured 5 mm and has an SUV of 2.4,  previously 1.5. Hypermetabolic upper lobe nodules on the previous PET/CT  appear to have been treated. There is a 6 mm right upper lobe nodule  adjacent to a bandlike scar with an SUV of 2.8. There is no  hypermetabolic mediastinal or hilar lymphadenopathy.     2. There is heterogeneous activity scattered throughout the liver, but  there are no suspicious foci.  There is no hypermetabolic lymphadenopathy  within the abdomen.     3. At the pelvis, there is circumferential hypermetabolic activity at  the distal colon extending to the anus, approximately 9 cm segment of  colon with an SUV of 9.8. There is a fairly large volume of formed stool  within this segment and the finding is indeterminate as recent enemas or  stercoral chronic colitis may have this appearance. Correlation with  endoscopy is recommended. The presacral soft tissue thickening has  low-level activity with an SUV of 2.7.  There is no hypermetabolic lymphadenopathy within the pelvis.     4. There is no suspicious hypermetabolic activity at the neck. No  significant change in the mucous retention cysts at both maxillary  sinuses.     This report was finalized on 8/15/2024 8:20 AM by Dr. Caroline Solano M.D on  Workstation: [id number]         ASSESSMENT:   1.  Metastatic rectal cancer.   Initial rectal ultrasound examination reported T3N0 disease without lymphadenopathy.   CT scan of chest, abdomen and pelvis reported no lymphadenopathy in the abdomen, pelvis or chest. She does have small pulmonary nodule 6-7 mm and 2 mm with indeterminate feature. There is no solid evidence of metastatic disease otherwise.   Based on the CT scan and rectal ultrasound imaging studies, this patient had stage IIA (T3N0M0) disease.   She had PET scan examination on 8/8/2019 which reported a hypermetabolic right upper lobe pulmonary nodule 6 mm with SUV 5.6.  This is suspicious for metastatic disease however it is too small to be biopsied.  This patient may have stage IV disease.   She initiated concurrent radiation with continuous 5-FU on 8/12/2019.  Patient finished concurrent chemoradiation therapy.  Patient underwent surgical resection of the rectal tumor and diverting loop ileostomy on 12/2/2019 with Dr. Ye.  Pathology evaluation reported residual T3 disease, with 1 out of 14 lymph nodes positive for malignancy.  Certainly this  patient has at least stage IIIb rectal cancer (T3N1M0?)  On 1/22/2020 adjuvant chemotherapy FOLFOX 6 regimen initiated.   On 2/5/2022 cycle #2 was delayed secondary to neutropenia.  She was given 3 days of Granix.   Emend added with cycle 3.  With improved nausea control  Continuing home Granix x3 days following 5-FU disconnect  3/11/2020 due for cycle 4, however, she is experiencing progressive abdominal pain and occasional fevers.   CT scan performed on 3/13/2020 reveals no evidence of progressive or recurrent disease.  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  Patient hospitalized 4/24-4/26/20 after cycle 5 of chemotherapy with acute UTI.  CT abdomen/pelvis noting fluid collection in the presacral region having diminished in size compared to CT dated 3/13/2020.  Patient was evaluated by Dr. Ye while in the hospital with further plans to evaluate possible colovaginal fistula following completion of chemotherapy.  Patient did respond to IV antibiotics and discharged home on oral cefdinir.  4/29/2020 cycle 6 of FOLFOX.  Urinary symptoms have resolved   Patient seen in the [facility] ED on 5/2/2020 for what was suspected to be an allergic reaction.  She called our service on Saturday explaining that she was experiencing hand and face swelling.  She was instructed to proceed to the emergency department at which time she was assessed and prescribed a Medrol Dosepak.  She had just completed her 5-FU and was unhooked on Friday, 5/1/2020.  Her symptoms have resolved in the office on assessment, 5/5/2020.  The patient had grade 3 hand-foot syndrome based on symptomology.  Patient had cycle #8 of 5-FU on 5/13/2020. Due to her symptoms and poor tolerance to the 5-FU, her treatment dose will be reduced 50% for oxaliplatin and infusional 5-FU.  Bolus 5-FU will be discontinued..  On 5/21/2020 patient had a PET scan and it showed no evidence of of metastatic disease in the neck, chest, abdomen or pelvis.  There was fluid  accumulation/abscess.   On 5/27/2020 discussed with the patient that we can discontinue chemotherapy at this time.  She will follow-up with Dr. Ye to discuss a possibility to reverse the ileostomy.  We likely will obtain anther PET scan in 3 to 4 months for reassessment.    Patient was seen by Dr. Ye on 6/19/2019 for evaluation and to discuss possible take down of her ileostomy.  She is scheduled to have a gastrografin enema on 7/2/2020 to evaluate for a fistula, and then a colonoscopy on 7/15/2020, both done by Dr. Ye.  She states that based on the above imaging and procedure findings, she may have a more extensive surgery done or just have her ileostomy reversed, which would likely be done in August 2020.  This will all be coordinated under the care of Dr. Ye.     CT scan from 09/09/2020 and also compared to her previous PET scan examination from 05/21/2020 and also the original PET scan from 08/09/2019.  The original PET scan there is a small right upper lobe pulmonary nodule 6 mm with SUV 5.6. So in the 05/2020 PET scan the nodule is still there but activity seems much less and this most recent CT scan examination also documented the preexisting small pulmonary nodule however there is a new left lower lobe pulmonary nodule 8 mm in size and I shared with the patient today this nodule is suspicious for metastatic disease. Laboratory studies reported minimal CEA level 1.32 on 09/09/2020. Liver function panel was only marginally abnormal with the ALT 45, the remaining is normal. However laboratory study today showed worsening AST 55, ALT 95 and alkaline phosphatase 143 but is still normal, total bilirubin 0.3.   Recommended to have repeat PET scan examination for assessment because the left lower lobe pulmonary nodule is too small to be biopsied, and if the PET scan reports hypermetabolic lesion, I recommended to have stereotactic radiation therapy. Explained to patient that she is not a really good  candidate to have thoracotomy for resection because she still has unhealed wound in the abdomen. Stereotactic radiation therapy will likely be a more feasible choice.   PET scan examination obtained on 9/18/2020 showed metastatic disease.  It reported a few hypermetabolic pulmonary nodules, and some new small pulmonary nodules, all of them highly suspicious for metastatic disease.  She also has hypermetabolic activity in the abdomen and pelvic area which could be related to scar tissue from her recent surgery versus metastatic disease.  Nevertheless overall picture fits with metastatic cancer.  Discussed with the patient on 9/20/2020, we reviewed the PET scan images together, and I recommended to have systematic chemotherapy, I do not think stereotactic reading therapy to the hypermetabolic lesions in the lungs warranted at this time because even those will be treated with reading therapy, she will still need systematic chemotherapy.  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  I did discuss with the patient using anti-EGFR monoclonal antibody versus anti-VEGF monoclonal antibody.     Palliative chemotherapy cycle#1 FOLFIRI was started on 10/13/2020.  No bolus 5-FU given considering previous poor tolerance.    NGS study from [facility] [employer] reported positive for [place] mutation.  Microsatellite stable, mutation burden 5/Mb.   Discussed with the patient 10/27/2020, because of the K-s[place] mutation, she is not a candidate for anti-EGFR monoclonal antibody such as Erbitux or vectibix.  She could be a candidate for anti-VEGF monoclonal antibody, however because of active wound, she is not a candidate right now at this moment.  Patient seen in the ED for acute right neck pain on 11/16/2020.  CT angiogram as well as CT of the cervical spine performed with no acute findings but notably one of her pulmonary nodules, left upper lobe, somewhat decreased in size.  Patient given prednisone  pack.  Further details below.  Cycle #6 chemotherapy will be resumed on 1/5/2021.  Started back on original irinotecan.  PET scan examination obtained on 1/12/2021 after cycle #6 chemotherapy reported improved pulmonary nodule hypermetabolic activity.  Confirmed these are metastatic lesions.  Patient is evaluated 1/19/2020, will continue cycle #7 FOLFIRI chemotherapy.  However Avastin will be on hold see next section.   Patient was reevaluated on 2/2/2021, will continue chemotherapy cycle #8 FOLFIRI.  Avastin / biosimilar will be added.    She talked to [facility] specialist in mid February 2021, and had recommended palliative chemotherapy without surgical intervention of metastatic lung lesions.   On 3/2/2021, patient will proceed with cycle #10 FOLFIRI plus bevacizumab.  After 12 cycles, plan to start maintenance treatment.  3/16/2021 cycle 11.  Plus bevacizumab.  3/30/2021 cycle 12 FOLFIRI plus bevacizumab.  Having issues with worsening nausea despite premeds with dexamethasone, Aloxi, Emend, and Zofran at home.  Patient is requesting a dose of Phenergan, which is helped her in the past.  We will give this to her with treatment today.  PET scan examination on 4/6/2021 reported no evidence of hypermetabolic metastatic lesion.  Discussed with the patient on 4/13/2021, we reviewed the PET scan results.  We recommended to switch to maintenance chemotherapy without irinotecan.  We will continue 5-FU and Avastin every 2 weeks.  We discussed possibility of switching to oral Xeloda treatment.  Discussed with the patient for side effects more so with hand-foot syndrome.  Patient is agreeable.   Xeloda 2000 mg twice daily 7 days on, 7 days off along with Avastin initiated 4/27/2021.  After only 5 doses of Xeloda significant hand-foot syndrome, Xeloda held. Patient requesting to be transitioned back to infusional 5-FU, as she felt that she tolerated infusional 5-FU without any problem.  The  patient will receive 5-FU leucovorin and Avastin every 2 weeks.  Xeloda discontinued.  5/25/2021 continued cycle #4 maintenance 5-FU, leucovorin, Avastin tolerating this much better so far, we will continue this. Recommended to have 12 cycles of maintenance chemotherapy, then obtain PET scan for reevaluation.  We could discuss further treatment plan at that time.  9/14/2021 patient due for cycle 12 of additional maintenance 5-FU/leucovorin plus Avastin.  She continues to tolerate treatment well overall.  She is anxious in the office today with her Mediport not getting blood at first and also with upcoming scans being heavy on her mind.  She was instructed to take one of her Klonopin pills while here to help calm her mind.  Heart rate did improve from 140s down to 112.  Proceed with treatment today as scheduled.  PET scan examination on 9/24/2021 reported further shrinking of the right upper lobe tiny pulmonary nodule.  No other new lesions.  Discussed with patient on 9/24/2021 about further shrinking of the right upper lobe pulmonary nodule and no evidence for other new lesions. We recommended to have chemo break for 3 months with repeated CT scan for reevaluation.  Recent CT scan for chest 12/14/2021 and abdomen CT from 11/29/2021 reported no evidence for active disease. The right upper lobe pulmonary nodule, left lower lobe pulmonary nodule minimal about 4 mm and stable. I discussed with patient today on 12/21/2021, recommended to give her another 3 months chemotherapy holiday and obtain CT scan afterwards for reevaluation. After discussion, patient is agreeable.   CT scan examination for chest abdomen and pelvis obtained on 3/15/2022 reported a slight increase of the left upper lobe pulmonary nodule 5 mm, from previous 3 mm.  The other pulmonary nodules were stable in size.  There is also small left upper lobe groundglass changes.   Dr. Nguyen reviewed images studies with the patient 3/23/2022, compared to  multiple previous images including CT chest CT and PET scan, suspected disease progression.  Discussed with the patient, and I recommended to resume maintenance chemotherapy with 5-FU plus leucovorin plus Avastin repeating every 2 weeks, and obtaining CT scan for reassessment in 3 months.  Patient is agreeable.  Patient resumed maintenance chemotherapy on 3/29/2022 with 5-FU leucovorin and Avastin.   4/26/2022, proceed with cycle #27 maintenance 5-FU, leucovorin, and Avastin.  Patient continues to tolerate treatment well.    On 5/10/2022, we will proceed ahead with cycle #28 maintenance chemotherapy.  Plan to have CT scan after cycle #30.  5/24/2022 cycle 29 maintenance chemotherapy.  Continue to tolerate well.  6/7/2022 cycle #30 maintenance chemotherapy, continuing to tolerate well.   CT scan for chest abdomen pelvis with IV contrast obtained on 6/21/2022 reported sub-6 mm pulmonary nodules either stable or slightly smaller.  We reviewed the images with the patient together.  We discussed with the patient and recommended to hold chemotherapy for now with repeating CT scan in 3 months for reassessment.  CT scan of the chest abdomen pelvis 9/13/2022 reported stable condition, no evidence for recurrent or metastatic colon cancer.  On 1/10/2023 patient had a CT chest abdomen pelvis with reported no evidence for disease progression.  Laboratory study also showed normal CEA.   Patient had a CT scan for chest, abdomen, and pelvis obtained on 04/11/2023. This study reported very small pulmonary nodules, the right upper lobe nodule is stable to 6 mm, however, the left upper lobe and the left lower lobe nodule increased to 5 mm from previous 4 mm. I discussed with the patient today on 04/19/2023, I recommend to obtain another CT scan in 3 months for reassessment. The nodules are so small, they likely will not be picked up by PET scan examination.  CT scan examination of the chest, abdomen, and pelvis obtained on 7/7/2023  reported stable small pulmonary nodules. No evidence for disease progression or new lesions in chest, abdomen, and pelvis.  Discussed with patient today on 7/14/2023, however, patient is concerning for disease progression. I recommended to obtain Guardant Reveal for circulating tumor DNA study. If the study is positive, we will further obtain PET scan examination, otherwise, we will obtain CT scan for chest, abdomen, and pelvis in 3 months for reassessment.  The patient had CT scan for the chest, abdomen, and pelvis obtained on 10/27/2023, which reported worsening pulmonary nodules at bilateral upper lobes. Laboratory studies showed low normal CEA level and normal liver function panel. The Guardant Reveal study is still pending. I discussed this with the patient on 11/03/2023 and we shared the images, and I recommended having a PET scan examination for further assessment. I will present her case at the multimodality lung conference. If those lesions are deemed to be metastatic lesions, I recommend having stereotactic radiotherapy. I discussed it with the patient, and she voiced understanding and was agreeable with that strategy.  I presented her case at the multimodality chest conference 11/16/2023.  The consensus was for patient to receive stereotactic radiation therapy for the right upper lobe lung lesion, and the bigger left upper lobe lesion, and monitor the smaller left upper lobe lesion with further images studies down the line. Also, the conference recommended Guardant Reveal study which we already ordered. I discussed with the patient today on 11/17/2023, we explained to the patient that the opinion of the conference and she is agreeable with this and prefers this strategy because of limited toxicity compared to long term commitment to starting chemotherapy again. I recommend to obtain a CT scan for the chest, abdomen, and pelvis with both IV and oral contrast for reassessment in 3 months for reassessment of  metastatic lung lesions and thickness in the pelvis where the PET scan reported a low activity SUV 2.4 in the surgical bed.   The patient had steroid-induced radiation therapy for the right upper lobe and one of the left upper lobe lesions.  Her CT scan examination on 02/02/2024 reported shrinking nodules of the right upper lobe and one of the left upper lobe lesions, both from 9 mm down to 6 mm. There are 2 stable pulmonary nodules 7 mm. Nothing new.  02/09/2024, I discussed with the patient and recommended CT scan for the chest, abdomen, and pelvis in 3 months for reassessment. Guardant Reveal study was 0% ctDNA. We will also continue laboratory studies every 3 months for Guardant Reveal, together with routine labs, CBC, CMP, and CEA.  CT scan of the chest, abdomen, and pelvis conducted on 4/23/2024 revealed stable multiple pulmonary nodules, with no other new pulmonary nodules or evidence of disease recurrence or abnormal pelvis. The patient's liver function panel was normal, and low-level CEA level was also noted. The Guardant Reveal study was able to be obtained earlier due to regulatory rules, and we hugo obtain today the Guardant reveal study, be available within 10 to 14 days.   Given the patient's metastatic liver lesion, and lung lesions from colon cancer, careful monitoring is necessary. A chest CT scan with IV contrast will be arranged in 3 months for reevaluation. The study will be reviewed again in 3 months, which will include CBC, CMP, and CEA level.   Imaging study with chest CT scan on 08/02/2024 reported multiple bilateral pulmonary nodules that are relatively stable. However, the Guardant Reveal reported positive ctDNA indicating disease progression. A subsequent PET scan examination on 08/14/2024 reported newly developed hypermetabolic pulmonary nodules. The highest SUV is 3.7, corresponding to an 8 mm new right upper lobe nodule, and there are several other hypometabolic nodules in the lungs.    8/21/2024 resumption of chemotherapy with 5-FU bevacizumab  Triage visit 8/28/2024 with intractable diarrhea that was unclear if this is secondary to chemotherapy or infectious etiology given her known chronic colitis, fever and abdominal pain.  Further management as outlined below  She will be due for chemotherapy again on Wednesday, 9/4/2024.      2.   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  Low-dose Lovenox discontinued and initiated Xarelto 20 mg daily.  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  Patient held Xarelto for 2 days prior to MediPort removal from the left chest wall and placement of new port in the right chest wall on 12/18/2020.  She resumed Xarelto that evening.  Progressive swelling in the neck, face, upper extremities prompting hospitalization with CT scan showing thrombosis in the right subclavian vein and SVC.  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  Although she had been off of Xarelto for a few days, clearly Xarelto was not prohibiting progression of her thrombosis after she resumed treatment and there was some evidence to suggest thrombosis had been present at least in the innominate vein on prior scan in September but now appears chronic.  Current acute thrombosis involving SVC and right subclavian.  Patient was admitted and placed on heparin drip  Patient was evaluated by vascular surgery and intervention was  not recommended for thrombolysis/thrombectomy.  On 12/22/2020, the patient developed worsening shortness of breath, increasing upper extremity edema consistent with worsening SVC syndrome.  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  Symptoms improved and patient was discharged 12/24/2020 on Lovenox 100 mg every 12 hours.    Returns 12/29/2020 for evaluation continuing Lovenox, overall tolerating it well.  No bleeding issues.  Improved edema 1/5/2021.  Will continue Lovenox weight-based every 12 hours.  On 1/19/2021 and 2/2/2021, patient reports improved facial swelling.  She however does have being bruise on her abdomen wall where she does Lovenox injection.  However she does not have a spontaneous epistaxis, gum bleeding or other bleeding.   On 2/2/2021, we discussed that side effects of Avastin/biosimilar related to thrombosis.  Since this patient already has been on Lovenox, I think the benefits from treatment for her cancer will outweigh that risk of thrombosis, will proceed ahead with Avastin.  I cautioned patient to watch out for signs of worsening thrombosis patient voiced understanding.   5/11/2021 Lovenox dosing will be adjusted due to patient's weight loss.  We discussed she needs 1 mg/kg.  Her syringes are 100 mg syringes she will do 0.9 mL subcu every 12 hours.  8/3/2021 Patient continues to have bruising on abdomen from lovenox injections.  Will need to monitor weight and adjust dosing in future if further weight loss.   Stable condition on 9/28/2021.  Continue Lovenox anticoagulation.    Patient reports no chest pain no dyspnea no leg edema. However she had bruising and also most recently abnormal small abscess for which she actually went to ER on 11/29/2021, had I&D. The wound is healing. No further drainage. Denies fever sweating or chills. After discussion, she will continue Lovenox  injection for now.   On 3/23/2022, patient reports good tolerance of Lovenox.  Nevertheless she still has small quantity of pus in the left lower abdomen abscess, she squeezes it every day to prevent it became worse.  She reports no fever sweating chills.  Recommended to start Augmentin 875 mg twice a day for 7 days.  She will continue Lovenox indefinitely.  On 4/12/2022, patient reports resolution of the small abscess at left lower abdominal wall.   On 5/10/2022, patient continues on Lovenox indefinitely.  No easy bleeding or bruising.  6/23/2022 continuing on Lovenox 1 mg/kg at a dose of 100 mg (patient weight is 96.6 kg today) every 12 hours.  On 1/17/2023, patient reports good tolerance, Lovenox will be continued.    Patient had a venous Doppler study on 02/05/2023, which reported acute left upper extremity DVT in the subclavian vein, axillary and the brachial vein, and also superficial thrombophlebitis in the basilic upper arm.   On 04/19/2023, patient reports that she was not compliant with anticoagulation at the time of recurrent DVT. She now is fully compliant and is using Lovenox.  She continues on anticoagulation.  She has only occasional blood with wiping which is related to her frequent diarrhea    3.  This patient also has strong family history for malignancy   The patient had appointment with genetic counseling on September 3, 2019.  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    4.  Mild anemia.   She also has history of iron deficiency.  Iron studies reveal iron saturation of 10%.  She was started on oral iron but was unable to tolerate due to GI side effects.   Status post Injectafer 2/5/2020 and 2/12/2020   Improved hemoglobin 13.5 on 1/5/2021.  3/16/2020 when hemoglobin now up to 16.2, hematocrit 47.6.  Patient admits that she has started smoking again, and I have encouraged her to quit.  She denies any snoring or sleep apnea diagnosis.  Patient is not taking oral iron.  We will closely  monitor.  3/30/2020 hemoglobin 15.7, HCT 47.5.  Patient states that she has cut back significantly on her smoking, although not quit yet.  I have encouraged her to continue working on this.  We will continue to closely monitor.  Hemoglobin 14.6 on 6/8/2021 at the meantime he has worsening macrocytosis .6.    Laboratory studies 6/22/2021 reported excellent folate > 20 ng/mL, however low normal B12 level 357 pg/mL.    On 7/6/2021, normal hemoglobin 15.8, .9 and HCT 47.2%.  Patient was asked to start oral vitamin B12 at 1000 mcg daily.   9/14/2021 hemoglobin 17.0, hematocrit 52.1.  Monitor.  On 9/28/2021 hemoglobin 16.1 hematocrit 48.5%, continue to monitor.   Lab study on 12/14/2021 reported excellent ferritin 296 and iron saturation 25% with free iron 104 TIBC 424. Hemoglobin was 15.2 with .2.   Normal hemoglobin 14.6 on 3/15/2022.  Iron study reported normal ferritin 129.5 ng/mL however slightly low iron saturation 11%.  Continue to monitor for now.  4/26/2022, hemoglobin 14.8.  6/7/2022 hemoglobin 15.5.  8/30/2024 hemoglobin is normal at 13.7    5.  Peripheral neuropathy secondary to chemotherapy.   This is mild involving bilateral hands and feet as reported on 9/16/2020.   Will avoid chemotherapy with oxaliplatin.  Stable mild neuropathy as of 11/10/2020  Patient reports worsening peripheral neuropathy and cycle #5 chemotherapy on 12/8/2020 with and without irinotecan.   On 1/5/2021, patient reports improvement of peripheral neuropathy, will add irinotecan back to chemotherapy.  Continue to monitor and adjust medication.  No worsening peripheral neuropathy today.     6.  Depression.  Patient seen by JULIET Davenport on 11/9/2020.  She was started on mirtazapine.  Lexapro was discontinued.  This has definitely improved her mood with the patient feeling overall much better.  She is sleeping better.  Appetite is improved with her actually eating more than she wants to.    Condition is stable.   She plans to continue follow-up with supportive oncology.    7. Malfunction of the portal catheter  Patient reports there was no blood drawn from the portal catheter. CT scan of the chest on 7/7/2023 reported occlusion of the SVC around to the Port-A-Cath tubing. I recommend trying activase to see if it helps.  Otherwise, we may have to remove the catheter. Clinically, patient has no signs of SVC syndrome.  On 11/03/2023, the patient reports that her Port-A-Cath was able to be used for a CT scan for the injection of intravenous dye; however, there was no blood return, so we needed to draw from her arm for the blood test. We will continue to keep Port-A-Cath for now.  On 02/09/2024, the patient reports that the port was able to be flushed. However, no blood was returned. We will continue to flush every 6 weeks.    *Intractable diarrhea  Patient developed diarrhea on Saturday, 8/24/2024.  Is unclear if this is related to infection as she did have fevers at the time the diarrhea began or chemotherapy.  She does have chronic colitis noted on most recent PET scan, this therefore could be diverticulitis flare  GI panel and C. difficile negative  8/29/2024 she did receive a single dose of octreotide  Begin empiric Flagyl 500 mg 3 times daily x 10 days  We discussed today, 8/30/2024 negative stool studies.  We will add Levaquin to already prescribed Flagyl to complete management of diverticulitis.  It is unclear if the patient's symptoms are related to diverticular flare given her previous abdominal pain and fever versus chemotherapy.  However, her symptoms are currently improved          PLAN:    1 L normal saline given in the infusion area today  Continue Flagyl 500 mg 3 times daily x 10 days  Begin Levaquin 500 milligrams daily x 10 days  Continue Protonix 40 mg daily  Continue Gas-X  Return tomorrow for 1 L normal saline  Magic barrier cream as needed  Continue anticoagulation with Lovenox subcu injection every 12  hours.  A liquid biopsy/Guardant reveal study will be conducted to monitor her response together with intermittent imaging studies.  Return Wednesday, 9/4/2024 for CBC, CMP, magnesium, nurse practitioner follow-up and possible next cycle of 5-FU Avastin    Patient continues on high risk medication requiring close monitoring for toxicity    Patience Angelamonica Lorenzo, APRN  08/30/2024        CC:  Deepika Vela III NP-MD Alverto Rahman MD

## 2024-09-04 ENCOUNTER — OFFICE VISIT (OUTPATIENT)
Dept: ONCOLOGY | Facility: CLINIC | Age: 44
End: 2024-09-04
Payer: COMMERCIAL

## 2024-09-04 ENCOUNTER — INFUSION (OUTPATIENT)
Dept: ONCOLOGY | Facility: HOSPITAL | Age: 44
End: 2024-09-04
Payer: COMMERCIAL

## 2024-09-04 ENCOUNTER — LAB (OUTPATIENT)
Dept: LAB | Facility: HOSPITAL | Age: 44
End: 2024-09-04
Payer: COMMERCIAL

## 2024-09-04 VITALS
SYSTOLIC BLOOD PRESSURE: 121 MMHG | TEMPERATURE: 97.9 F | WEIGHT: 200.2 LBS | OXYGEN SATURATION: 97 % | HEART RATE: 81 BPM | DIASTOLIC BLOOD PRESSURE: 79 MMHG | HEIGHT: 66 IN | BODY MASS INDEX: 32.17 KG/M2 | RESPIRATION RATE: 16 BRPM

## 2024-09-04 DIAGNOSIS — Z79.899 HIGH RISK MEDICATION USE: ICD-10-CM

## 2024-09-04 DIAGNOSIS — T45.1X5A CHEMOTHERAPY INDUCED DIARRHEA: ICD-10-CM

## 2024-09-04 DIAGNOSIS — C20 ADENOCARCINOMA OF RECTUM: ICD-10-CM

## 2024-09-04 DIAGNOSIS — C20 ADENOCARCINOMA OF RECTUM: Primary | ICD-10-CM

## 2024-09-04 DIAGNOSIS — Z79.899 ENCOUNTER FOR LONG-TERM (CURRENT) USE OF HIGH-RISK MEDICATION: ICD-10-CM

## 2024-09-04 DIAGNOSIS — K52.1 CHEMOTHERAPY INDUCED DIARRHEA: ICD-10-CM

## 2024-09-04 DIAGNOSIS — C78.00 MALIGNANT NEOPLASM METASTATIC TO LUNG, UNSPECIFIED LATERALITY: ICD-10-CM

## 2024-09-04 DIAGNOSIS — Z79.899 ENCOUNTER FOR LONG-TERM (CURRENT) USE OF HIGH-RISK MEDICATION: Primary | ICD-10-CM

## 2024-09-04 DIAGNOSIS — Z79.01 ON ANTICOAGULANT THERAPY: ICD-10-CM

## 2024-09-04 LAB
ALBUMIN SERPL-MCNC: 3.6 G/DL (ref 3.5–5.2)
ALBUMIN/GLOB SERPL: 1.3 G/DL
ALP SERPL-CCNC: 76 U/L (ref 39–117)
ALT SERPL W P-5'-P-CCNC: 14 U/L (ref 1–33)
ANION GAP SERPL CALCULATED.3IONS-SCNC: 10.1 MMOL/L (ref 5–15)
AST SERPL-CCNC: 14 U/L (ref 1–32)
BASOPHILS # BLD AUTO: 0.01 10*3/MM3 (ref 0–0.2)
BASOPHILS NFR BLD AUTO: 0.2 % (ref 0–1.5)
BILIRUB SERPL-MCNC: 0.2 MG/DL (ref 0–1.2)
BUN SERPL-MCNC: 5 MG/DL (ref 6–20)
BUN/CREAT SERPL: 7.8 (ref 7–25)
CALCIUM SPEC-SCNC: 8.7 MG/DL (ref 8.6–10.5)
CHLORIDE SERPL-SCNC: 105 MMOL/L (ref 98–107)
CO2 SERPL-SCNC: 24.9 MMOL/L (ref 22–29)
CREAT SERPL-MCNC: 0.64 MG/DL (ref 0.57–1)
DEPRECATED RDW RBC AUTO: 50.4 FL (ref 37–54)
EGFRCR SERPLBLD CKD-EPI 2021: 111.2 ML/MIN/1.73
EOSINOPHIL # BLD AUTO: 0.15 10*3/MM3 (ref 0–0.4)
EOSINOPHIL NFR BLD AUTO: 2.6 % (ref 0.3–6.2)
ERYTHROCYTE [DISTWIDTH] IN BLOOD BY AUTOMATED COUNT: 14.8 % (ref 12.3–15.4)
GLOBULIN UR ELPH-MCNC: 2.8 GM/DL
GLUCOSE SERPL-MCNC: 105 MG/DL (ref 65–99)
HCT VFR BLD AUTO: 42.3 % (ref 34–46.6)
HGB BLD-MCNC: 14 G/DL (ref 12–15.9)
IMM GRANULOCYTES # BLD AUTO: 0.02 10*3/MM3 (ref 0–0.05)
IMM GRANULOCYTES NFR BLD AUTO: 0.3 % (ref 0–0.5)
LYMPHOCYTES # BLD AUTO: 1.17 10*3/MM3 (ref 0.7–3.1)
LYMPHOCYTES NFR BLD AUTO: 20.2 % (ref 19.6–45.3)
MCH RBC QN AUTO: 31.5 PG (ref 26.6–33)
MCHC RBC AUTO-ENTMCNC: 33.1 G/DL (ref 31.5–35.7)
MCV RBC AUTO: 95.1 FL (ref 79–97)
MONOCYTES # BLD AUTO: 0.46 10*3/MM3 (ref 0.1–0.9)
MONOCYTES NFR BLD AUTO: 7.9 % (ref 5–12)
NEUTROPHILS NFR BLD AUTO: 3.99 10*3/MM3 (ref 1.7–7)
NEUTROPHILS NFR BLD AUTO: 68.8 % (ref 42.7–76)
NRBC BLD AUTO-RTO: 0 /100 WBC (ref 0–0.2)
PLATELET # BLD AUTO: 196 10*3/MM3 (ref 140–450)
PMV BLD AUTO: 9.1 FL (ref 6–12)
POTASSIUM SERPL-SCNC: 4.1 MMOL/L (ref 3.5–5.2)
PROT SERPL-MCNC: 6.4 G/DL (ref 6–8.5)
RBC # BLD AUTO: 4.45 10*6/MM3 (ref 3.77–5.28)
SODIUM SERPL-SCNC: 140 MMOL/L (ref 136–145)
WBC NRBC COR # BLD AUTO: 5.8 10*3/MM3 (ref 3.4–10.8)

## 2024-09-04 PROCEDURE — 25810000003 SODIUM CHLORIDE 0.9 % SOLUTION: Performed by: NURSE PRACTITIONER

## 2024-09-04 PROCEDURE — 96367 TX/PROPH/DG ADDL SEQ IV INF: CPT

## 2024-09-04 PROCEDURE — 25810000003 SODIUM CHLORIDE 0.9 % SOLUTION 250 ML FLEX CONT: Performed by: NURSE PRACTITIONER

## 2024-09-04 PROCEDURE — 96375 TX/PRO/DX INJ NEW DRUG ADDON: CPT

## 2024-09-04 PROCEDURE — 80053 COMPREHEN METABOLIC PANEL: CPT

## 2024-09-04 PROCEDURE — 25010000002 PALONOSETRON PER 25 MCG: Performed by: NURSE PRACTITIONER

## 2024-09-04 PROCEDURE — 99215 OFFICE O/P EST HI 40 MIN: CPT | Performed by: NURSE PRACTITIONER

## 2024-09-04 PROCEDURE — 25010000002 FOSAPREPITANT PER 1 MG: Performed by: NURSE PRACTITIONER

## 2024-09-04 PROCEDURE — 96365 THER/PROPH/DIAG IV INF INIT: CPT

## 2024-09-04 PROCEDURE — 85025 COMPLETE CBC W/AUTO DIFF WBC: CPT

## 2024-09-04 PROCEDURE — 25010000002 LEUCOVORIN CALCIUM PER 50 MG: Performed by: NURSE PRACTITIONER

## 2024-09-04 PROCEDURE — G0498 CHEMO EXTEND IV INFUS W/PUMP: HCPCS

## 2024-09-04 PROCEDURE — 25010000002 FLUOROURACIL PER 500 MG: Performed by: NURSE PRACTITIONER

## 2024-09-04 PROCEDURE — 25010000002 DEXAMETHASONE SODIUM PHOSPHATE 100 MG/10ML SOLUTION: Performed by: NURSE PRACTITIONER

## 2024-09-04 RX ORDER — SODIUM CHLORIDE 9 MG/ML
20 INJECTION, SOLUTION INTRAVENOUS ONCE
Status: COMPLETED | OUTPATIENT
Start: 2024-09-04 | End: 2024-09-04

## 2024-09-04 RX ORDER — SODIUM CHLORIDE 9 MG/ML
20 INJECTION, SOLUTION INTRAVENOUS ONCE
Status: CANCELLED | OUTPATIENT
Start: 2024-09-04

## 2024-09-04 RX ORDER — PALONOSETRON 0.05 MG/ML
0.25 INJECTION, SOLUTION INTRAVENOUS ONCE
Status: COMPLETED | OUTPATIENT
Start: 2024-09-04 | End: 2024-09-04

## 2024-09-04 RX ORDER — PALONOSETRON 0.05 MG/ML
0.25 INJECTION, SOLUTION INTRAVENOUS ONCE
Status: CANCELLED | OUTPATIENT
Start: 2024-09-04

## 2024-09-04 RX ADMIN — LEUCOVORIN CALCIUM 560 MG: 350 INJECTION, POWDER, LYOPHILIZED, FOR SUSPENSION INTRAMUSCULAR; INTRAVENOUS at 10:27

## 2024-09-04 RX ADMIN — DEXAMETHASONE SODIUM PHOSPHATE 12 MG: 10 INJECTION, SOLUTION INTRAMUSCULAR; INTRAVENOUS at 10:14

## 2024-09-04 RX ADMIN — SODIUM CHLORIDE 20 ML/HR: 9 INJECTION, SOLUTION INTRAVENOUS at 09:41

## 2024-09-04 RX ADMIN — FOSAPREPITANT 100 ML: 150 INJECTION, POWDER, LYOPHILIZED, FOR SOLUTION INTRAVENOUS at 09:41

## 2024-09-04 RX ADMIN — FLUOROURACIL 3360 MG: 50 INJECTION, SOLUTION INTRAVENOUS at 11:00

## 2024-09-04 RX ADMIN — PALONOSETRON HYDROCHLORIDE 0.25 MG: 0.25 INJECTION INTRAVENOUS at 09:43

## 2024-09-04 NOTE — PROGRESS NOTES
REASON FOR FOLLOW UP:     Rectal cancer, rectal ultrasound examination in July 2019 reported T3N0 disease without lymphadenopathy. She does have small pulmonary nodule 6-7 mm and 2 mm with indeterminate feature. There is no solid evidence of metastatic disease otherwise. Patient has stage IIA (T3N0M0) disease.  The patient is heterozygous factor V Leiden, was on prophylactic anticoagulation with Lovenox 40 mg daily given her increased risk of thrombosis with MediPort and GI malignancy.   PET scan on 8/8/2019 reported an 8 mm hypermetabolic right upper lobe pulmonary nodule, which is suspicious for metastatic as well.    Patient had a PowerPort placement on the left upper chest by Dr. Joseph on 8/9/2019.  Patient was started on concurrent infusional 5-FU chemoradiation therapy on 8/12/2019, with planned complete surgical resection and further adjuvant chemotherapy with intention to cure the disease.   Patient underwent surgical resection of the primary rectal cancer by Dr. Ye on 12/2/2019.  Pathology evaluation reported residual T3N1 disease stage IIIb.  Diarrhea related to therapy and radiation.   Patient was started cycle 1 day 1 of adjuvant FOLFOX 6 on 1/23/2020.  On 2/5/2020 FOLFOX 6 cycle 2 delayed secondary to neutropenia.  Patient was given 3 days of Granix injection.  After cycle #2 we planned 3 days of Granix with each cycle.   Patient also intolerant of oral iron.  Patient received 2 doses of IV injectafer on 02/05/2020 and 02/12/2020.   02/12/2020 Proceed with cycle #2 of FOLFOX 6 with 3 days of Granix.  On 3/11/2020 cycle 4 postponed secondary to abdominal pain and occasional low-grade fevers.  CT scans ordered.  Cycle #4 resumed after CT scan revealed no evidence of disease.  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 Ye. patient has no fever.  Continue to observe.   Grade 3 hand-foot syndrome secondary to 5-FU after cycle #7  FOLFOX 6 chemotherapy, prompting ER visit.  Also has worsening peripheral neuropathy.   Cycle #8 FOLFOX 6 was given on 5/13/2020, with 50% dose reduction for both 5-FU and oxaliplatin, and also examination of bolus 5-FU.   PET scan examination on 5/21/2020 reported no evidence of metastatic disease in the chest abdomen pelvis.  Stable 6 mm RUL pulmonary nodule.  On 5/27/2020, I discussed with the patient that we can discontinue chemotherapy at this time.   Patient had a surgical procedure for low anterior colon resection, coloanal anastomosis on 8/3/2020.  CT scan for chest abdomen pelvis on 9/9/2020 reported a new 8 mm noncalcified pulmonary nodule in the left lower lobe of the lung.  Otherwise stable right upper lobe 6 mm pulmonary nodule, and no evidence of disease recurrence in the abdomen or pelvis.  PET scan examination on 9/18/2020 reported multiple hypermetabolic small pulmonary nodules/ new pulmonary nodules.   Patient was started on pelvic chemotherapy with FOLFIRI regimen on 10/13/2020.   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 It reported tumor mutation burden 5 Muts/Mb, microsatellite stable, TP53 mutation [id number], and others.   Cycle #5 was without irinotecan, due to peripheral neuropathy.  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  Patient was switched to weight-based Lovenox injection.  Cycle #6 5-FU and irinotecan was delayed by 2 weeks because of the above incident.  Patient had a telemedicine evaluation at that the [facility] cancer [place] in February 2021.  They agreed with our treatment plan for palliative chemotherapy followed by maintenance chemotherapy.    PET scan examination on 4/6/2021 after cycle #12 palliative chemotherapy reported no evidence of hypermetabolic metastatic lesion.   Patient was started on maintenance chemotherapy with 5-FU and  Avastin on 4/13/2021. (Unable to tolerate Xeloda because of a significant hand-foot syndrome).   PET scan on 9/24/2021 obtained after cycle #12 maintenance chemotherapy with 5-FU, leucovorin, Avastin reported no evidence for active disease. We recommended 3 months chemotherapy holiday.  CT scan examination on 3/15/2022 reported slightly disease progression with increase in size of small pulmonary nodule.  We started patient back on maintenance chemotherapy on 3/29/2022 treatment with 5-FU plus leucovorin and Avastin, repeating every 2 weeks.  After 6 more maintenance chemotherapy, CT scan for chest abdomen pelvis was done on 6/21/2022 which reported stable sub-6 mm pulmonary nodules.  Patient had last maintenance chemotherapy on 6/7/2022.       HISTORY OF PRESENT ILLNESS:  The patient is a 44 y.o. female with the above-mentioned history, who is seen back today for consideration of 5-FU/leucovorin/bevacizumab.  She was seen multiple times in triage visits last week by JULIET Jacobs, presenting initially 8/28/2024 with intractable diarrhea.  She was given IV fluid support.  Stool studies were negative for infectious etiology, negative C. difficile and negative stool for PCR.  She was treated with Flagyl and Levaquin given chronic colitis seen on most recent PET scan.  Additionally on 8/29/2024 she received a single dose of octreotide.  Throughout this intractable diarrhea she did continue taking maximum doses of Lomotil and Imodium.    All that to say as she is now reviewed back today for consideration of cycle 2 of 5-FU/leucovorin/bevacizumab, she is overall doing better.  She does note some concern that over the weekend she felt like she could not easily pass gas having to turn in certain positions to get the gas to move.  She also noted inability to put hydrocortisone cream inside her rectum almost was that there was a blockage there.  She felt the same way in terms of stool passage noting that she had to change  positions on the toilet to get the stool to easily pass without it being painful.  We understand that her most recent PET scan showed potential colitis but also potential disease within the rectum and we agreed on referring her back to Dr. Esparza for repeat rectal examination or potentially for colonoscopy.  She does have 3 days left of the Flagyl and Levaquin and understands that if she did have significant inflammation in her rectum complicated by all the diarrhea this could cause the swelling and more difficulty with passage of gas and stool.    She otherwise comparatively feels much better today versus last week.  She does want to proceed with treatment.  She denies other concerns at this time.      Past Medical History:   Diagnosis Date    Abdominopelvic abscess 08/12/2020    ADMITTED TO [facility]    Abnormal Pap smear of cervix 02/02/1998    JULIUS I    Abscess of abdominal wall 11/28/2012    SEEN AT [facility] ER    Anemia in pregnancy     Anxiety     Bilateral epiphora 04/2020    Bilateral hand swelling 05/02/2020    SEEN AT [facility] ER    Cervical lymphadenitis 06/06/2012    SEEN AT [facility] ER    Chemotherapy induced diarrhea 01/2021    Chemotherapy induced neutropenia 04/2020    Chemotherapy-induced nausea 02/2020    Chemotherapy-induced thrombocytopenia 05/2020    Chronic anticoagulation     Chronic diarrhea     Colon polyps     FOLLOWED BY DR. 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 ALEXANDRU HUNT    History of gestational diabetes     History of pre-eclampsia     History of pre-eclampsia     History of radiation therapy     FOLLOWED BY DR. JAVON LEWIS    History of TB skin testing 2009    TB Skin Test    History of TB skin testing 2009    TB Skin Test    History of transfusion 2019    2019    HPV in female     Hypokalemia 2019    Hypomagnesemia 2019    Hyponatremia 2021    IBS (irritable bowel syndrome)     Ileostomy present 2020    Take down on 11/3/2022    Infected insect bite of neck 2016    SEEN AT [facility]    Kidney stones 2007    SEEN AT [facility] ER    Low anterior resection syndrome 2022    FOLLOWED BY DR. GERONIMO ESPARZA    Lump of right breast     SWOLLEN LYMPH NODE    Lung cancer 2020    METASTATIC LUNG CANCER    Macrocytosis 2021    Monopolar depression     On anticoagulant therapy     Pain associated with surgical procedure 2020    Palmar-plantar erythrodysesthesia 2021    Perirectal abscess 2020    Pulmonary embolism without acute cor pulmonale 09/20/2019    X 3; ADMITTED TO [facility]    Pulmonary nodule, right 2020    Rectal cancer 2019    STAGE IIA, INVASIVE MODERATELY DIFFERENTIATED ADENOCARCINOMA, CHEMO AND XRT FINISHED 2019    Right shoulder pain 2020    SEEN AT [facility] ER    Right ureteral stone 10/01/2019    SEEN AT [facility] ER    SAB (spontaneous ) 1996     A2-1 INDUCED    Sciatica     Sepsis due to cellulitis 2002    LEFT GREAT TOE, ADMITTED TO [facility]    Tachycardia 2020    Thrombosis of superior vena cava 2020    AND BRACHIOCEPHALIC VEIN, ADMITTED TO [facility]    Urinary urgency 2020   Patient had COVID-19 infection diagnosed on 2022 despite was fully vaccinated and boosted.  She recovered without problem.      Past Surgical History:   Procedure Laterality Date    ABDOMINAL SURGERY      CHOLECYSTECTOMY N/A 10/10/2003     LAPAROSCOPIC WITH CHOLANGIOGRAM, DR. JAMEY TALAVERA AT [facility]    COLON RESECTION N/A 12/02/2019    Procedure: laparoscopic low anterior colon resection with mobilization of splenic flexure and diverting loop ileostomy: ERAS;  Surgeon: Padmaja Esparza MD;  Location: [facility];  Service: General    COLON RESECTION N/A 08/03/2020    Procedure: LOW ANTERIOR COLON RESECTION, COLOANAL ANASTOMOSIS, MOBILIZATION SPLENIC FLEXURE;  Surgeon: Padmaja Esparza MD;  Location: [facility];  Service: General;  Laterality: N/A;    COLONOSCOPY N/A 07/15/2020    PATENT ANASTAMOSIS IN MID RECTUM, RESCOPE IN 1 YR, DR. PADMAJA ESPARZA AT [facility]    COLONOSCOPY N/A 11/03/2022    DIFFUSE AREA OF MODERATELY FRIABLE MUCOSA IN ENTIRE COLON , DIVERSION COLITIS, PATENT AND HEALTHY ANASTAMOSIS, DR. PADMAJA ESPARZA AT [facility]    COLONOSCOPY N/A 12/04/2023    6 MM SESSILE SERRATED ADENOMA POLYP IN DESCENDING, PATENT ANASTAMOSIS IN DISTAL RECTUM, RESCOPE IN 2 YRS, DR. PADMAJA ESPARZA AT [facility]    COLONOSCOPY W/ POLYPECTOMY N/A 07/08/2019    15 MM TUBULOVILLOUS ADENOMA POLYP IN HEPATIC FLEXURE, 20 MMTUBULOVILLOUS ADENOMA WITH HIGH GRADE DYSPLASIA IN RECTOSIGMOID, 4 CM MASS IN MID RECTUM, PATH: INVASIVE MODERATELY DIFFERENTIATED ADENOCARCINOMA, DR. JENNIFER LI AT [facility]    DILATATION AND EVACUATION N/A 2009    ENDOSCOPY N/A 07/08/2019    LA GRADE A ESOPHAGITIS, GASTRITIS, ALL BIOPSIES BENIGN, DR. JENNIFER LI AT [facility]    ILEOSTOMY CLOSURE N/A 11/14/2022    Procedure: ILEOSTOMY TAKEDOWN;  Surgeon: Padmaja Esparza MD;  Location: [facility];  Service: General;  Laterality: N/A;    INCISION AND DRAINAGE PERIRECTAL ABSCESS N/A 08/14/2020    Procedure: INCISION AND DRAINAGE OF retrorectal dehiscence abcess with drain placement and irrigation;  Surgeon: Padmaja Esparza MD;  Location: [facility];  Service: General;  Laterality: N/A;    PAP SMEAR N/A 01/24/2014    SIGMOIDOSCOPY N/A 07/24/2019    INFILTRATIVE PARTIALLY OBSTRUCTING  LARGE RECTAL CANCER, AREA TATOOED, DR. GERONIMO YE AT [facility]    SIGMOIDOSCOPY N/A 11/23/2019    AN ULCERATED PARTIALLY OBSTRUCTING MASS IN MID RECTUM, AREA TATTOOED, DR. GERONIMO YE AT [facility]    SIGMOIDOSCOPY N/A 07/22/2021    PATENT ANASTAMOSIS IN DISTAL RECTUM, RESCOPE IN 1 YR, DR. GERONIMO YE AT [facility]    TRANSRECTAL ULTRASOUND N/A 07/24/2019    Procedure: ULTRASOUND TRANSRECTAL;  Surgeon: Geronimo Ye MD;  Location: [facility] ENDOSCOPY;  Service: General    URETEROSCOPY LASER LITHOTRIPSY WITH STENT INSERTION Right 10/30/2019    DR. ESTUARDO BEASLEY AT [facility]    VAGINAL DELIVERY N/A 09/18/1998    VENOUS ACCESS DEVICE (PORT) INSERTION Left 08/09/2019    Procedure: INSERTION VENOUS ACCESS DEVICE;  Surgeon: Sj Joseph MD;  Location: [facility];  Service: General    VENOUS ACCESS DEVICE (PORT) INSERTION N/A 12/18/2020    Procedure: INSERTION VENOUS ACCESS DEVICE right side, removal venous access device left side;  Surgeon: Sj Joseph MD;  Location: [facility];  Service: General;  Laterality: N/A;    WISDOM TOOTH EXTRACTION Bilateral 1993       HEMATOLOGIC/ONCOLOGIC HISTORY:      The patient reports she has intermittent rectal bleeding and urgency, mucous with her stool, starting sometime in 2016. At that time she was referred to GI service, and was diagnosed of irritable bowel syndrome. Nevertheless she had worsening urgency for bowel movement, and had a couple of incidents losing control of stool. She was recently seen by Roland Thorpe MD again and had colonoscopy and EGD exam on 07/08/2019. She was found having a circumferential rectal mass. According to the procedure note, the patient had a fungating circumferential bleeding 4 cm mass in the middle rectum, at distance between 13 cm and 17 cm from the anus. Mass was causing partial obstruction. There were also colon polyps found at the hepatic flexure and also at the rectosigmoid junction 23 cm from the anus. Both were resected and retrieved. EGD  examination reported grade A esophagitis at the GE junction and patchy discontinuous erythema and aggravation of the mucosa without bleeding in the stomach body. There is normal mucosa of the duodenum. Pathology evaluation from this procedure reported moderately differentiated adenocarcinoma involving the rectal mass. The rectal sigmoid junction polyp was tubular/tubulovillous adenoma with high grade dysplasia negative for invasive malignancy. The hepatic flexure polyp was also tubular/tubulovillous adenoma negative for high grade dysplasia or malignancy. The biopsy from the esophagus reports squamocolumnar mucosa with inflammatory changes suggestive of mild reflux esophagitis, negative for interstitial metaplasia. Gastric biopsy was benign and duodenal biopsy was also benign. There is no mention of H-pylori.     Patient was subsequently referred to colorectal surgeon Padmaja Ye MD for further evaluation. The patient had CT scan examination for chest, abdomen and pelvis requested by Dr. Ye and were done on 07/13/2019. The study reported no evidence of lymphadenopathy in the abdomen and pelvis. There was wall thickening of the rectosigmoid junction. Normal spleen, pancreas, adrenal glands and kidneys. There was fatty liver infiltration but no focal lymphatic lesions. There was a small 6-7 mm noncalcified nodule in the right upper lobe and a tiny 3 mm subpleural nodule in the right middle lobe. No mediastinal or hilar lymphadenopathy.     Dr. Ye performed staging rectal ultrasound examination. This study reported tumor penetrating through the muscularis propria as T3 disease; there were no lymph nodes identified.    She had a hospitalization in late September 2019 because of newly discovered pulmonary emboli.  She was on prophylactic Lovenox prior to the incident of PE.  Now she is on full dose Xarelto anticoagulation.  Patient reports no further chest pain dyspnea.  She denies bleeding or bruising.  During  her hospitalization, she was seen by Dr. Ye, who plans to have surgery 8 to 12 weeks after finishing radiation therapy.  She finished her radiation on 9/19/2019.     I noticed patient also presented to the emergency room on 10/1/2019 complaining of right flank area pain.  I reviewed the images studies and indeed she has a very small 1 to 2 mm obstructing kidney stone in the UV junction.  Patient is still symptomatic with some pains and dysuria.  She denies fever sweating or chills.    Laboratory study on 10/7/2019 reported normal CBC including hemoglobin 13.1, platelets 301,000, WBC 6170 and ANC 4900 lymphocytes 590 monocytes 480.      The nurse reported malfunction of port-a-catheter on 10/7/2019.  Port study on 10/8/2019 showed fibrin sheath around the distal aspect of the Mediport catheter in the SVC. This does not appear to hinder injection, but does prevent aspiration at this time.    Patient underwent surgical resection of the colon on 12/2/2019 with Dr. Ye.  Pathology evaluation reported residual T3 disease, also 1 out of 14 lymph nodes positive for malignancy.  So this patient in either had at least stage IIIb disease (T3 N1 M0?).      Adjuvant chemotherapy FOLFOX 6 will be started on 1/22/2020.  Laboratory study reported iron saturation 10%, free iron 31 TIBC 319 and ferritin 168.  Her hemoglobin was 11.8, WBC 5600, and platelets 347,000.    Patient was here on 02/12/2020 for cycle 2 of her FOLFOX.  This is delayed x1 week secondary to neutropenia.  The patient ultimately received 3 days worth of Neupogen with recovery of her blood counts.  Of note, the patient struggled with significant nausea without any episodes of vomiting following cycle #1 of chemotherapy resulting in significant weight loss.  She is up 12 pounds over the last week as her appetite has normalized.  We will add Emend to her care plan.    She is having several loose stools per her colostomy and has been hesitant to take Imodium due  to prior history of constipation.  I encouraged her to try this with a maximum of 8 tablets/day.  She denies any infectious symptoms including fevers or chills.  No excess bleeding or bruising noted.  She had the expected cold sensitivity related to the Oxaliplatin for about 3 days following treatment.    Labs from 02/12/2020 demonstrated total white blood cell count of 5.14, ANC of 3.06, hemoglobin of 11.2, platelets of 211,000.  She was found to be iron deficient last week and is intolerant to oral iron secondary to GI distress.  For this reason, she initiated IV iron therapy with Injectafer last week.  She had received her second dose 02/12/2020    Patient has normalized hemoglobin 12.2 on 2/26/2020.    She reported on 5/5/2020 she had a recent visit to the emergency department for what was suspected to be an allergic reaction.  She called our on-call service on Saturday with reports of hand and face swelling.  She was instructed to proceed to the emergency department at which time she was assessed and prescribed a Medrol Dosepak.  She had just completed her 5-FU and was unhooked on Friday, 5/3/2020.  Her symptoms have improved.  She does report persistent hyperpigmentation and mild swelling of the palms of the hands but this is much improved.  It was her right hand specifically that was swollen.  Her facial swelling has resolved.  She continues on Cefdinir nd since has 1 day remaining, she was prescribed cefdinir for a UTI requiring hospitalization at the end of April.  Reports no new symptoms.  Her labs are stable.  She is scheduled for treatment again.    Patient states at this time she is not tolerating her chemotherapy well.     Patient seen in the emergency department on 5/2/2020 for what was suspected to be an allergic reaction.  She called our on-call service on Saturday explaining that she was experiencing hand and face swelling.  She was instructed to proceed to the emergency department at which time  she was assessed and prescribed a Medrol Dosepak.  She had just completed her 5-FU and was unhooked on Friday, 5/1/2020.      She reports since her ED visit on 5/2/2020 her symptoms have not improved. Her hands and feet were swollen upon presenting to the ED and she could barely make a fist. She states that she feels so swollen she is not able to stand it. She states that her skin on the hands and feet are peeling extensively as well, besides changing color to more dark.     She also reports significant fatigue after her first week of the 5-FU treatment but she expected this side effect. She also notices significant increase in her neuropathy to the point that she is not able to even walk around in her home without her house slippers due to irritation from her rugs. She denies and associated nausea or vomiting at this time. She also has episodes of epistaxis every day after the chemotherapy cycle #7.  She does report working full-time during the week of chemotherapy.    Laboratory studies, 5/13/2020, show moderate thrombocytopenia platelets 123,000, low normal WBC 4140 including ANC 2720 and normal hemoglobin 13.4.  Because significant hand-foot syndrome, will decrease both 5-FU and oxaliplatin by 50%, and eliminate bolus dose of 5-FU.    On 5/21/2020 patient had a PET scan performed which showed no convincing evidence of residual disease in abdomen or pelvis, no metastatic disease within the chest or neck.     Cycle #8 FOLFOX 6 was given on 5/13/2020, with 50% dose reduction for both 5-FU and oxaliplatin, and also examination of bolus 5-FU.  She states on 5/27/2020 that with the recent reduction of the chemotherapy she feels significantly better. She has more energy and is able to do her daily routine.      PET scan examination on 5/21/2020 reported no evidence of metastatic disease in chest abdomen pelvis.  There was a stable 6 mm right upper lobe pulmonary nodule.    Laboratory studies on 5/27/2020 showed mild  leukocytopenia WBC 3720 but a normal ANC 2250 and lymphocytes 630.  Normal platelets 163,000 and hemoglobin 12.6.  Chemistry lab reported normal renal function, liver function panel and a electrolytes, elevated glucose 164.    Laboratory studies 6/24/2020, showed normal hemoglobin 13.4 but macrocytosis .9.  Normal platelets 210,000 and WBC 5870.  She had normal CMP.     Patient last time was here in late June 2020.  Since that time she had surgical procedure for low anterior colon resection, coloanal anastomosis on 8/3/2020.  She later developed a perirectal abscess, required surgical drainage on 8/14/2020.  She was discharged on 8/27/2020.    Patient reports to me she still has lower abdominal wall vacuum suction in place.  She denies fever sweating chills.  Performance status is ECOG 1.  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  She does have visiting nurse come to the home for wound care.    CT scan for chest abdomen pelvis on 9/9/2020 reported a new 8 mm noncalcified pulmonary nodule in the left lower lobe.  Otherwise stable right upper lobe 6 mm pulmonary nodule, and no evidence of disease recurrence in the abdomen or pelvis.     Laboratory study on 9/16/2020 reported elevated AST 55, ALT 95, alk phosphatase 143 but normal total bilirubin 0.3.  Chemistry lab otherwise unremarkable.  She has completed normal CBC.      Because of the abnormal CT scan examination for chest abdomen pelvis on 9/9/2020 reported a new 8 mm noncalcified pulmonary nodule in the left lower lobe, we obtained a PET scan examination for further evaluation, which was done on 9/18/2020.  This study reported several pulmonary nodules, some of them are hypermetabolic, newly developed compared to previous PET scan in May 2020.  Certainly does highly suspicious for metastatic disease.  There are also hypermetabolic activity in the abdomen and pelvic area which is hard to differentiate from surgical scar versus  metastatic malignancy.    Laboratory study on 10/13/2020 reported normal CBC with Hb 13.9, platelets 302,000 and WBC 5520 including ANC 3830.  Chemistry lab reported normalized AST 20, alk phosphatase 116 improved ALT 35, and maintains normal bilirubin, with normal electrolytes and liver function panel.     Patient was started on first cycle of palliative chemotherapy FOLFIRI on 10/13/2020.    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  Patient had a new port placed 12/18/2020, and had held her Xarelto for 2 days prior to surgery.  She presented to the ER on 12/21/20 with complaints of facial and arm swelling for 3 days.  She also noted increased shortness of breath.  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  Thrombus within the right internal jugular vein cannot be excluded.  Patient was started on IV heparin, and eventually transitioned to weight-based Lovenox, which she now continues.    PET scan examination on 9/24/2021 reported further shrinking of the tiny right upper lobe pulmonary nodule.  Otherwise no new lesions.  No evidence for metastatic disease in other areas.      Patient had CT scan for chest with IV contrast obtained on 12/14/2021 which reported small tiny stable pulmonary nodules. There is a 4 mm right upper lobe pulmonary nodule. There is also a stable 4 mm left lower lobe pulmonary nodule. There is also stable left upper lobe micronodule.     Laboratory studies today on 12/21/2021 reported normal hemoglobin 15.9 however .0, platelets 218,000 WBC 5360 including neutrophils 3730 lymphocytes 980. Chemistry lab reported normal renal function, electrolytes, glucose, and marginally elevated ALT 55, AST 34 but normal total bilirubin and alk phosphatase.     CT scan for chest abdomen pelvis 6/21/2022  reported sub-6 mm pulmonary nodules either stable or slightly smaller.  No new pulmonary nodules or evidence for disease progression.  There is no evidence for metastatic disease in the liver however it shows diffuse hepatic steatosis.  There was masslike thickening in the presacral space with the clips or calcification approximately 1.7 cm in greatest AP dimension but stable.    Laboratory study on 9/20/2022 reported normal hemoglobin 15.7 with mild macrocytosis .1.  She has normal CBC and platelets.  She also has unremarkable CMP.  Normal CEA 1.13 ng/mL.    Patient was seen by Dr. Ye, who performed ileostomy takedown on 11/14/2022.  Patient reports that she is recovering.  She still has a small open wound less than 1 cm in diameter, however close to 2 cm deep.  She has been changing dressing herself.  She denies fever sweating chills.    Patient has no other specific complaints.  She has excellent performance status ECOG 0.  She denies chest pain dyspnea cough hemoptysis.  No abdominal pain.  No melena hematochezia.  Patient has been eating well, stable weight.  She works full-time.    She continues Lovenox injections and denies any significant bleeding issues.   No other new problems or concerns.     CT scan for chest abdomen pelvis obtained on 1/10/2023 reported stable small pulmonary nodules, no evidence for active or new disease.    Laboratory study on 1/10/2023 reported normal CBC and normal CMP.  CEA level is 0.99 ng/mL.    CT scan for chest, abdomen, and pelvis on 7/7/2023 reported stable small pulmonary nodules including a left upper lobe 5 mm nodule. There were no new masses, or pleural effusion. No enlarged supraclavicular, axillary, mediastinal, or hilar lymphadenopathy. Mediastinal vasculature normal. There is occlusion of the superior vena around the catheter with body wall  is present. There was no evidence for disease recurrence in the abdomen or pelvis. Bone is also negative for  metastatic disease.    Laboratory studies obtained on 07/07/2023 also reported normal CBC, and normal CMP as well as low level CEA 1.07 ng/mL.    The CT scan for the chest on 10/27/2023 reported enlarging pulmonary nodules bilaterally. The right upper lobe lung nodule increased from 7 x 7 mm to 9 x 8 mm, and the left upper lobe nodule measured 8 x 9 mm from 5 x 6 mm in 04/2023. There is a different left upper lobe nodule 6 x 8 mm from previous 5 x 5 mm. The presacral tissue thickness measures up to 2.3 cm.    A laboratory study on 10/27/2023 reported CEA 1.58 ng/mL, normal CBC, and CMP.  Molecular study of peripheral blood Guardant Reveal is still pending.    The patient presents today on 11/17/2023 for reevaluation to discuss the results of PET scan examination as well as the results of tumor conference. I saw her recently on 11/03/2023 and because of enlarging pulmonary nodules on the CT scan, we requested a PET scan examination. I presented her case yesterday on 11/16/2023 at the [facility].    The patient reports she is at her baseline condition as 2 weeks ago. No specific complaints, no chest pain, dyspnea, cough, etc. She has a regular bowel movement.    Her case was present at the [facility]. on 11/16/2023. The PET scan images and chest CT scan were reviewed in conjunction with her treatment history. The consensus was for patient to receive stereotactic radiation therapy for the right upper lobe lung lesion, and the bigger left upper lobe lesion, and monitor the smaller left upper lobe lesion with further images studies down the line. Also, the conference recommended Guardant Reveal study which we already ordered.     This Guardant Reveal study came back today as negative for ctDNA 0%.        CT of the chest on 2/2/2024 reported shrinking of the right upper lobe lesion from 9 mm to 6 mm, and the left upper lobe lesion also decreased from 9 mm to 6 mm. Otherwise, there  are a couple of stable pulmonary nodules, 7 to 8 mm. The right hilar has a small lymph node that has increased from 6 mm to 8 mm and is otherwise stable. There was no suspicious lesion in the abdomen or pelvis.      MEDICATIONS    Current Outpatient Medications:     clonazePAM (KlonoPIN) 1 MG tablet, Take 1 tablet as needed daily for anxiety. May take one additional as needed for severe anxiety., Disp: 45 tablet, Rfl: 4    colestipol (Colestid) 5 g granules, Take 5 g by mouth 2 (Two) Times a Day. (Patient not taking: Reported on 8/28/2024), Disp: 300 g, Rfl: 0    Cyanocobalamin (VITAMIN B-12 PO), Take 1 tablet by mouth Every Other Day., Disp: , Rfl:     dicyclomine (BENTYL) 20 MG tablet, TAKE 1 TABLET BY MOUTH EVERY 6 (SIX) HOURS AS NEEDED FOR IRRITABLE BOWEL SYMPTOMS, Disp: 360 tablet, Rfl: 2    diphenoxylate-atropine (LOMOTIL) 2.5-0.025 MG per tablet, TAKE 2 TABLETS BY MOUTH 4 TIMES A DAY, Disp: 240 tablet, Rfl: 0    Enoxaparin Sodium (LOVENOX) 100 MG/ML solution prefilled syringe syringe, Inject 1 mL under the skin into the appropriate area as directed Every 12 (Twelve) Hours., Disp: 60 mL, Rfl: 2    escitalopram (LEXAPRO) 10 MG tablet, Take 1 tablet by mouth Daily., Disp: 90 tablet, Rfl: 1    famotidine (PEPCID) 20 MG tablet, TAKE 1 TABLET BY MOUTH TWICE A DAY, Disp: 180 tablet, Rfl: 2    Imvexxy Maintenance Pack 10 MCG insert, Insert 10 mcg into the vagina 2 (Two) Times a Week., Disp: , Rfl:     levoFLOXacin (Levaquin) 500 MG tablet, Take 1 tablet by mouth Daily., Disp: 10 tablet, Rfl: 0    Loperamide HCl (IMODIUM PO), Take  by mouth As Needed., Disp: , Rfl:     Loratadine 10 MG capsule, Take 1 capsule by mouth Every Evening., Disp: , Rfl:     metoprolol succinate XL (TOPROL-XL) 25 MG 24 hr tablet, TAKE 0.5 TABLETS BY MOUTH EVERY NIGHT, Disp: 45 tablet, Rfl: 2    metroNIDAZOLE (FLAGYL) 500 MG tablet, Take 1 tablet by mouth 3 (Three) Times a Day., Disp: 30 tablet, Rfl: 0    nystatin-hydrocortisone-bacitracin in  zinc oxide ointment, Apply  topically to the appropriate area as directed Daily., Disp: 60 g, Rfl: 2    ondansetron (ZOFRAN) 8 MG tablet, TAKE 1 TABLET BY MOUTH THREE TIMES A DAY AS NEEDED FOR NAUSEA AND VOMITING, Disp: 60 tablet, Rfl: 1    pantoprazole (Protonix) 40 MG EC tablet, Take 1 tablet by mouth Daily., Disp: 30 tablet, Rfl: 1    rosuvastatin (CRESTOR) 5 MG tablet, Take 1 tablet by mouth Daily., Disp: 90 tablet, Rfl: 0    ALLERGIES:   No Known Allergies    SOCIAL HISTORY:       Social History     Tobacco Use    Smoking status: Every Day     Current packs/day: 1.00     Average packs/day: 1 pack/day for 25.0 years (25.0 ttl pk-yrs)     Types: Electronic Cigarette, Cigarettes     Passive exposure: Current    Smokeless tobacco: Never    Tobacco comments:     1 PPD/caffeine use    Vaping Use    Vaping status: Some Days    Substances: Nicotine    Devices: Disposable    Passive vaping exposure: Yes   Substance Use Topics    Alcohol use: Not Currently     Comment: RARELY    Drug use: Never         FAMILY HISTORY:   Mother has positive factor V Leiden mutation, although she did not have thrombosis, mother also is coronary disease with stenting, she also is occasional bruising.    Maternal grandmother had DVT, she had multiple surgical procedures.    Patient mother's half-brother had metastatic colon cancer at diagnosis in his 50s.    Maternal great aunt had breast cancer.           There were no vitals filed for this visit.        8/29/2024    12:30 PM   Current Status   ECOG score 0     Physical Exam  Vitals reviewed.   Constitutional:       Appearance: Normal appearance. She is well-developed.   HENT:      Head: Normocephalic and atraumatic.      Comments:         Nose: Nose normal.   Eyes:      Conjunctiva/sclera: Conjunctivae normal.   Cardiovascular:      Rate and Rhythm: Normal rate and regular rhythm.      Heart sounds: Normal heart sounds.   Pulmonary:      Effort: Pulmonary effort is normal.      Breath  sounds: Normal breath sounds.   Abdominal:      General: Bowel sounds are normal.      Palpations: Abdomen is soft. There is no mass.      Tenderness: There is no abdominal tenderness.   Musculoskeletal:      Right lower leg: No edema.      Left lower leg: No edema.   Neurological:      Mental Status: She is alert. Mental status is at baseline.   Psychiatric:         Mood and Affect: Mood normal.       I have reexamined the patient and the results are consistent with the previously documented exam. JULIET Galeas       RECENT LABS:  Results from last 7 days   Lab Units 08/30/24  1118 08/28/24  1417   WBC 10*3/mm3 5.49 6.38   NEUTROS ABS 10*3/mm3 3.57 4.28   HEMOGLOBIN g/dL 13.7 15.5   HEMATOCRIT % 41.4 45.3   PLATELETS 10*3/mm3 209 199     Results from last 7 days   Lab Units 08/30/24  1118 08/28/24  1417   SODIUM mmol/L 142 138   POTASSIUM mmol/L 3.7 3.6   CHLORIDE mmol/L 107 102   CO2 mmol/L 26.0 21.9*   BUN mg/dL 9 10   CREATININE mg/dL 0.69 0.67   CALCIUM mg/dL 8.7 9.1   ALBUMIN g/dL 3.6 4.1   BILIRUBIN mg/dL 0.2 0.4   ALK PHOS U/L 75 92   ALT (SGPT) U/L 28 22   AST (SGOT) U/L 21 18   GLUCOSE mg/dL 104* 108*   MAGNESIUM mg/dL 2.0 2.1           IMAGING:  NM PET/CT Skull Base to Mid Thigh  F-18 FDG PET SKULL BASE TO MID THIGH WITH PET CT FUSION.     HISTORY: 44-year-old female with rectal cancer. Restaging.     TECHNIQUE: Radiation dose reduction techniques were utilized, including  automated exposure control and exposure modulation based on body size.   Blood glucose level at time of injection was 89 mg/dL. 6.8 mCi of F-18  FDG were injected and PET was performed from skull base to mid thigh. CT  was obtained for localization and attenuation correction. Time at  injection 8:00 a.m. PET start time 9:22 a.m. Normalization method:  patient weight. Compared with compared with PET/CT 11/08/2023, chest CT  08/02/2024, CTs of the chest abdomen pelvis 04/23/2024.     FINDINGS: Mediastinal blood pool has a  maximal SUV of 2.4, previously  2.4.     1. There are new pulmonary nodules since the previous PET/CT and there  are nodules which have increased in size. A new 8 mm right upper lobe  nodule has an SUV of 3.7. An 8 mm nodule at the superior medial aspect  of the left lower lobe previously measured 5 mm and has an SUV of 2.4,  previously 1.5. Hypermetabolic upper lobe nodules on the previous PET/CT  appear to have been treated. There is a 6 mm right upper lobe nodule  adjacent to a bandlike scar with an SUV of 2.8. There is no  hypermetabolic mediastinal or hilar lymphadenopathy.     2. There is heterogeneous activity scattered throughout the liver, but  there are no suspicious foci. There is no hypermetabolic lymphadenopathy  within the abdomen.     3. At the pelvis, there is circumferential hypermetabolic activity at  the distal colon extending to the anus, approximately 9 cm segment of  colon with an SUV of 9.8. There is a fairly large volume of formed stool  within this segment and the finding is indeterminate as recent enemas or  stercoral chronic colitis may have this appearance. Correlation with  endoscopy is recommended. The presacral soft tissue thickening has  low-level activity with an SUV of 2.7.  There is no hypermetabolic lymphadenopathy within the pelvis.     4. There is no suspicious hypermetabolic activity at the neck. No  significant change in the mucous retention cysts at both maxillary  sinuses.     This report was finalized on 8/15/2024 8:20 AM by Dr. Caroline Solano M.D on  Workstation: [id number]         ASSESSMENT:   1.  Metastatic rectal cancer.   Initial rectal ultrasound examination reported T3N0 disease without lymphadenopathy.   CT scan of chest, abdomen and pelvis reported no lymphadenopathy in the abdomen, pelvis or chest. She does have small pulmonary nodule 6-7 mm and 2 mm with indeterminate feature. There is no solid evidence of metastatic disease otherwise.   Based on the CT scan and  rectal ultrasound imaging studies, this patient had stage IIA (T3N0M0) disease.   She had PET scan examination on 8/8/2019 which reported a hypermetabolic right upper lobe pulmonary nodule 6 mm with SUV 5.6.  This is suspicious for metastatic disease however it is too small to be biopsied.  This patient may have stage IV disease.   She initiated concurrent radiation with continuous 5-FU on 8/12/2019.  Patient finished concurrent chemoradiation therapy.  Patient underwent surgical resection of the rectal tumor and diverting loop ileostomy on 12/2/2019 with Dr. Ye.  Pathology evaluation reported residual T3 disease, with 1 out of 14 lymph nodes positive for malignancy.  Certainly this patient has at least stage IIIb rectal cancer (T3N1M0?)  On 1/22/2020 adjuvant chemotherapy FOLFOX 6 regimen initiated.   On 2/5/2022 cycle #2 was delayed secondary to neutropenia.  She was given 3 days of Granix.   Emend added with cycle 3.  With improved nausea control  Continuing home Granix x3 days following 5-FU disconnect  3/11/2020 due for cycle 4, however, she is experiencing progressive abdominal pain and occasional fevers.   CT scan performed on 3/13/2020 reveals no evidence of progressive or recurrent disease.  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  Patient hospitalized 4/24-4/26/20 after cycle 5 of chemotherapy with acute UTI.  CT abdomen/pelvis noting fluid collection in the presacral region having diminished in size compared to CT dated 3/13/2020.  Patient was evaluated by Dr. Ye while in the hospital with further plans to evaluate possible colovaginal fistula following completion of chemotherapy.  Patient did respond to IV antibiotics and discharged home on oral cefdinir.  4/29/2020 cycle 6 of FOLFOX.  Urinary symptoms have resolved   Patient seen in the [facility] ED on 5/2/2020 for what was suspected to be an allergic reaction.  She called our service on Saturday explaining that she was experiencing hand and  face swelling.  She was instructed to proceed to the emergency department at which time she was assessed and prescribed a Medrol Dosepak.  She had just completed her 5-FU and was unhooked on Friday, 5/1/2020.  Her symptoms have resolved in the office on assessment, 5/5/2020.  The patient had grade 3 hand-foot syndrome based on symptomology.  Patient had cycle #8 of 5-FU on 5/13/2020. Due to her symptoms and poor tolerance to the 5-FU, her treatment dose will be reduced 50% for oxaliplatin and infusional 5-FU.  Bolus 5-FU will be discontinued..  On 5/21/2020 patient had a PET scan and it showed no evidence of of metastatic disease in the neck, chest, abdomen or pelvis.  There was fluid accumulation/abscess.   On 5/27/2020 discussed with the patient that we can discontinue chemotherapy at this time.  She will follow-up with Dr. Ye to discuss a possibility to reverse the ileostomy.  We likely will obtain anther PET scan in 3 to 4 months for reassessment.    Patient was seen by Dr. Ye on 6/19/2019 for evaluation and to discuss possible take down of her ileostomy.  She is scheduled to have a gastrografin enema on 7/2/2020 to evaluate for a fistula, and then a colonoscopy on 7/15/2020, both done by Dr. Ye.  She states that based on the above imaging and procedure findings, she may have a more extensive surgery done or just have her ileostomy reversed, which would likely be done in August 2020.  This will all be coordinated under the care of Dr. Ye.     CT scan from 09/09/2020 and also compared to her previous PET scan examination from 05/21/2020 and also the original PET scan from 08/09/2019.  The original PET scan there is a small right upper lobe pulmonary nodule 6 mm with SUV 5.6. So in the 05/2020 PET scan the nodule is still there but activity seems much less and this most recent CT scan examination also documented the preexisting small pulmonary nodule however there is a new left lower lobe pulmonary  nodule 8 mm in size and I shared with the patient today this nodule is suspicious for metastatic disease. Laboratory studies reported minimal CEA level 1.32 on 09/09/2020. Liver function panel was only marginally abnormal with the ALT 45, the remaining is normal. However laboratory study today showed worsening AST 55, ALT 95 and alkaline phosphatase 143 but is still normal, total bilirubin 0.3.   Recommended to have repeat PET scan examination for assessment because the left lower lobe pulmonary nodule is too small to be biopsied, and if the PET scan reports hypermetabolic lesion, I recommended to have stereotactic radiation therapy. Explained to patient that she is not a really good candidate to have thoracotomy for resection because she still has unhealed wound in the abdomen. Stereotactic radiation therapy will likely be a more feasible choice.   PET scan examination obtained on 9/18/2020 showed metastatic disease.  It reported a few hypermetabolic pulmonary nodules, and some new small pulmonary nodules, all of them highly suspicious for metastatic disease.  She also has hypermetabolic activity in the abdomen and pelvic area which could be related to scar tissue from her recent surgery versus metastatic disease.  Nevertheless overall picture fits with metastatic cancer.  Discussed with the patient on 9/20/2020, we reviewed the PET scan images together, and I recommended to have systematic chemotherapy, I do not think stereotactic reading therapy to the hypermetabolic lesions in the lungs warranted at this time because even those will be treated with reading therapy, she will still need systematic chemotherapy.  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  I did discuss with the patient using anti-EGFR monoclonal antibody versus anti-VEGF monoclonal antibody.     Palliative chemotherapy cycle#1 FOLFIRI was started on 10/13/2020.  No bolus 5-FU given considering previous poor  tolerance.    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  Microsatellite stable, mutation burden 5/Mb.   Discussed with the patient 10/27/2020, because of the K-s[place]ia mutation, she is not a candidate for anti-EGFR monoclonal antibody such as Erbitux or vectibix.  She could be a candidate for anti-VEGF monoclonal antibody, however because of active wound, she is not a candidate right now at this moment.  Patient seen in the ED for acute right neck pain on 11/16/2020.  CT angiogram as well as CT of the cervical spine performed with no acute findings but notably one of her pulmonary nodules, left upper lobe, somewhat decreased in size.  Patient given prednisone pack.  Further details below.  Cycle #6 chemotherapy will be resumed on 1/5/2021.  Started back on original irinotecan.  PET scan examination obtained on 1/12/2021 after cycle #6 chemotherapy reported improved pulmonary nodule hypermetabolic activity.  Confirmed these are metastatic lesions.  Patient is evaluated 1/19/2020, will continue cycle #7 FOLFIRI chemotherapy.  However Avastin will be on hold see next section.   Patient was reevaluated on 2/2/2021, will continue chemotherapy cycle #8 FOLFIRI.  Avastin / biosimilar will be added.    She talked to [facility] specialist in mid February 2021, and had recommended palliative chemotherapy without surgical intervention of metastatic lung lesions.   On 3/2/2021, patient will proceed with cycle #10 FOLFIRI plus bevacizumab.  After 12 cycles, plan to start maintenance treatment.  3/16/2021 cycle 11.  Plus bevacizumab.  3/30/2021 cycle 12 FOLFIRI plus bevacizumab.  Having issues with worsening nausea despite premeds with dexamethasone, Aloxi, Emend, and Zofran at home.  Patient is requesting a dose of Phenergan, which is helped her in the past.  We will give this to her with treatment today.  PET scan examination on 4/6/2021 reported no evidence of hypermetabolic  metastatic lesion.  Discussed with the patient on 4/13/2021, we reviewed the PET scan results.  We recommended to switch to maintenance chemotherapy without irinotecan.  We will continue 5-FU and Avastin every 2 weeks.  We discussed possibility of switching to oral Xeloda treatment.  Discussed with the patient for side effects more so with hand-foot syndrome.  Patient is agreeable.   Xeloda 2000 mg twice daily 7 days on, 7 days off along with Avastin initiated 4/27/2021.  After only 5 doses of Xeloda significant hand-foot syndrome, Xeloda held. Patient requesting to be transitioned back to infusional 5-FU, as she felt that she tolerated infusional 5-FU without any problem.  The patient will receive 5-FU leucovorin and Avastin every 2 weeks.  Xeloda discontinued.  5/25/2021 continued cycle #4 maintenance 5-FU, leucovorin, Avastin tolerating this much better so far, we will continue this. Recommended to have 12 cycles of maintenance chemotherapy, then obtain PET scan for reevaluation.  We could discuss further treatment plan at that time.  9/14/2021 patient due for cycle 12 of additional maintenance 5-FU/leucovorin plus Avastin.  She continues to tolerate treatment well overall.  She is anxious in the office today with her Mediport not getting blood at first and also with upcoming scans being heavy on her mind.  She was instructed to take one of her Klonopin pills while here to help calm her mind.  Heart rate did improve from 140s down to 112.  Proceed with treatment today as scheduled.  PET scan examination on 9/24/2021 reported further shrinking of the right upper lobe tiny pulmonary nodule.  No other new lesions.  Discussed with patient on 9/24/2021 about further shrinking of the right upper lobe pulmonary nodule and no evidence for other new lesions. We recommended to have chemo break for 3 months with repeated CT scan for reevaluation.  Recent CT scan for chest 12/14/2021 and abdomen CT from 11/29/2021 reported  no evidence for active disease. The right upper lobe pulmonary nodule, left lower lobe pulmonary nodule minimal about 4 mm and stable. I discussed with patient today on 12/21/2021, recommended to give her another 3 months chemotherapy holiday and obtain CT scan afterwards for reevaluation. After discussion, patient is agreeable.   CT scan examination for chest abdomen and pelvis obtained on 3/15/2022 reported a slight increase of the left upper lobe pulmonary nodule 5 mm, from previous 3 mm.  The other pulmonary nodules were stable in size.  There is also small left upper lobe groundglass changes.   Dr. Nguyen reviewed images studies with the patient 3/23/2022, compared to multiple previous images including CT chest CT and PET scan, suspected disease progression.  Discussed with the patient, and I recommended to resume maintenance chemotherapy with 5-FU plus leucovorin plus Avastin repeating every 2 weeks, and obtaining CT scan for reassessment in 3 months.  Patient is agreeable.  Patient resumed maintenance chemotherapy on 3/29/2022 with 5-FU leucovorin and Avastin.   4/26/2022, proceed with cycle #27 maintenance 5-FU, leucovorin, and Avastin.  Patient continues to tolerate treatment well.    On 5/10/2022, we will proceed ahead with cycle #28 maintenance chemotherapy.  Plan to have CT scan after cycle #30.  5/24/2022 cycle 29 maintenance chemotherapy.  Continue to tolerate well.  6/7/2022 cycle #30 maintenance chemotherapy, continuing to tolerate well.   CT scan for chest abdomen pelvis with IV contrast obtained on 6/21/2022 reported sub-6 mm pulmonary nodules either stable or slightly smaller.  We reviewed the images with the patient together.  We discussed with the patient and recommended to hold chemotherapy for now with repeating CT scan in 3 months for reassessment.  CT scan of the chest abdomen pelvis 9/13/2022 reported stable condition, no evidence for recurrent or metastatic colon cancer.  On 1/10/2023  patient had a CT chest abdomen pelvis with reported no evidence for disease progression.  Laboratory study also showed normal CEA.   Patient had a CT scan for chest, abdomen, and pelvis obtained on 04/11/2023. This study reported very small pulmonary nodules, the right upper lobe nodule is stable to 6 mm, however, the left upper lobe and the left lower lobe nodule increased to 5 mm from previous 4 mm. I discussed with the patient today on 04/19/2023, I recommend to obtain another CT scan in 3 months for reassessment. The nodules are so small, they likely will not be picked up by PET scan examination.  CT scan examination of the chest, abdomen, and pelvis obtained on 7/7/2023 reported stable small pulmonary nodules. No evidence for disease progression or new lesions in chest, abdomen, and pelvis.  Discussed with patient today on 7/14/2023, however, patient is concerning for disease progression. I recommended to obtain Guardant Reveal for circulating tumor DNA study. If the study is positive, we will further obtain PET scan examination, otherwise, we will obtain CT scan for chest, abdomen, and pelvis in 3 months for reassessment.  The patient had CT scan for the chest, abdomen, and pelvis obtained on 10/27/2023, which reported worsening pulmonary nodules at bilateral upper lobes. Laboratory studies showed low normal CEA level and normal liver function panel. The Guardant Reveal study is still pending. I discussed this with the patient on 11/03/2023 and we shared the images, and I recommended having a PET scan examination for further assessment. I will present her case at the multimodality lung conference. If those lesions are deemed to be metastatic lesions, I recommend having stereotactic radiotherapy. I discussed it with the patient, and she voiced understanding and was agreeable with that strategy.  I presented her case at the multimodality chest conference 11/16/2023.  The consensus was for patient to receive  stereotactic radiation therapy for the right upper lobe lung lesion, and the bigger left upper lobe lesion, and monitor the smaller left upper lobe lesion with further images studies down the line. Also, the conference recommended Guardant Reveal study which we already ordered. I discussed with the patient today on 11/17/2023, we explained to the patient that the opinion of the conference and she is agreeable with this and prefers this strategy because of limited toxicity compared to long term commitment to starting chemotherapy again. I recommend to obtain a CT scan for the chest, abdomen, and pelvis with both IV and oral contrast for reassessment in 3 months for reassessment of metastatic lung lesions and thickness in the pelvis where the PET scan reported a low activity SUV 2.4 in the surgical bed.   The patient had steroid-induced radiation therapy for the right upper lobe and one of the left upper lobe lesions.  Her CT scan examination on 02/02/2024 reported shrinking nodules of the right upper lobe and one of the left upper lobe lesions, both from 9 mm down to 6 mm. There are 2 stable pulmonary nodules 7 mm. Nothing new.  02/09/2024, I discussed with the patient and recommended CT scan for the chest, abdomen, and pelvis in 3 months for reassessment. Guardant Reveal study was 0% ctDNA. We will also continue laboratory studies every 3 months for Guardant Reveal, together with routine labs, CBC, CMP, and CEA.  CT scan of the chest, abdomen, and pelvis conducted on 4/23/2024 revealed stable multiple pulmonary nodules, with no other new pulmonary nodules or evidence of disease recurrence or abnormal pelvis. The patient's liver function panel was normal, and low-level CEA level was also noted. The Guardant Reveal study was able to be obtained earlier due to regulatory rules, and we hugo obtain today the Guardant reveal study, be available within 10 to 14 days.   Given the patient's metastatic liver lesion, and lung  lesions from colon cancer, careful monitoring is necessary. A chest CT scan with IV contrast will be arranged in 3 months for reevaluation. The study will be reviewed again in 3 months, which will include CBC, CMP, and CEA level.   Imaging study with chest CT scan on 08/02/2024 reported multiple bilateral pulmonary nodules that are relatively stable. However, the Guardant Reveal reported positive ctDNA indicating disease progression. A subsequent PET scan examination on 08/14/2024 reported newly developed hypermetabolic pulmonary nodules. The highest SUV is 3.7, corresponding to an 8 mm new right upper lobe nodule, and there are several other hypometabolic nodules in the lungs.   8/21/2024 resumption of chemotherapy with 5-FU bevacizumab  Triage visit 8/28/2024 with intractable diarrhea that was unclear if this is secondary to chemotherapy or infectious etiology given her known chronic colitis, fever and abdominal pain.  Further management as outlined below  9/4/2024 per review today diarrhea has improved though she is having some difficulty with passage of gas and stool,?  Related to significant inflammation in the rectum versus tumor obstruction.  The passage of gas has improved in the last few days but she does still have to change positions on the toilet to get stool to pass without it being painful.  Discussed all of this with Dr. Nguyen and we will refer the patient urgently back to Dr. Ye for at the very least rectal examination in the office versus full repeated colonoscopy.  Because of the potential for examination or invasive procedures, we will hold bevacizumab today.  Because the patient had significant diarrhea last week and concerns that it may be related to chemotherapy we will decrease her 5-FU/leucovorin today by 30%.  If she does well we can go back to full dose with cycle 3.      2.   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  Low-dose Lovenox discontinued and initiated Xarelto 20 mg daily.  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  Patient held Xarelto for 2 days prior to MediPort removal from the left chest wall and placement of new port in the right chest wall on 12/18/2020.  She resumed Xarelto that evening.  Progressive swelling in the neck, face, upper extremities prompting hospitalization with CT scan showing thrombosis in the right subclavian vein and SVC.  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  Although she had been off of Xarelto for a few days, clearly Xarelto was not prohibiting progression of her thrombosis after she resumed treatment and there was some evidence to suggest thrombosis had been present at least in the innominate vein on prior scan in September but now appears chronic.  Current acute thrombosis involving SVC and right subclavian.  Patient was admitted and placed on heparin drip  Patient was evaluated by vascular surgery and intervention was not recommended for thrombolysis/thrombectomy.  On 12/22/2020, the patient developed worsening shortness of breath, increasing upper extremity edema consistent with worsening SVC syndrome.  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  Symptoms improved and patient was discharged 12/24/2020 on Lovenox 100 mg every 12 hours.    Returns 12/29/2020 for  evaluation continuing Lovenox, overall tolerating it well.  No bleeding issues.  Improved edema 1/5/2021.  Will continue Lovenox weight-based every 12 hours.  On 1/19/2021 and 2/2/2021, patient reports improved facial swelling.  She however does have being bruise on her abdomen wall where she does Lovenox injection.  However she does not have a spontaneous epistaxis, gum bleeding or other bleeding.   On 2/2/2021, we discussed that side effects of Avastin/biosimilar related to thrombosis.  Since this patient already has been on Lovenox, I think the benefits from treatment for her cancer will outweigh that risk of thrombosis, will proceed ahead with Avastin.  I cautioned patient to watch out for signs of worsening thrombosis patient voiced understanding.   5/11/2021 Lovenox dosing will be adjusted due to patient's weight loss.  We discussed she needs 1 mg/kg.  Her syringes are 100 mg syringes she will do 0.9 mL subcu every 12 hours.  8/3/2021 Patient continues to have bruising on abdomen from lovenox injections.  Will need to monitor weight and adjust dosing in future if further weight loss.   Stable condition on 9/28/2021.  Continue Lovenox anticoagulation.    Patient reports no chest pain no dyspnea no leg edema. However she had bruising and also most recently abnormal small abscess for which she actually went to ER on 11/29/2021, had I&D. The wound is healing. No further drainage. Denies fever sweating or chills. After discussion, she will continue Lovenox injection for now.   On 3/23/2022, patient reports good tolerance of Lovenox.  Nevertheless she still has small quantity of pus in the left lower abdomen abscess, she squeezes it every day to prevent it became worse.  She reports no fever sweating chills.  Recommended to start Augmentin 875 mg twice a day for 7 days.  She will continue Lovenox indefinitely.  On 4/12/2022, patient reports resolution of the small abscess at left lower abdominal wall.   On  5/10/2022, patient continues on Lovenox indefinitely.  No easy bleeding or bruising.  6/23/2022 continuing on Lovenox 1 mg/kg at a dose of 100 mg (patient weight is 96.6 kg today) every 12 hours.  On 1/17/2023, patient reports good tolerance, Lovenox will be continued.    Patient had a venous Doppler study on 02/05/2023, which reported acute left upper extremity DVT in the subclavian vein, axillary and the brachial vein, and also superficial thrombophlebitis in the basilic upper arm.   On 04/19/2023, patient reports that she was not compliant with anticoagulation at the time of recurrent DVT. She now is fully compliant and is using Lovenox.  She continues on anticoagulation.  She has only occasional blood with wiping which is related to her frequent diarrhea    3.  This patient also has strong family history for malignancy   The patient had appointment with genetic counseling on September 3, 2019.  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    4.  Mild anemia.   She also has history of iron deficiency.  Iron studies reveal iron saturation of 10%.  She was started on oral iron but was unable to tolerate due to GI side effects.   Status post Injectafer 2/5/2020 and 2/12/2020   Improved hemoglobin 13.5 on 1/5/2021.  3/16/2020 when hemoglobin now up to 16.2, hematocrit 47.6.  Patient admits that she has started smoking again, and I have encouraged her to quit.  She denies any snoring or sleep apnea diagnosis.  Patient is not taking oral iron.  We will closely monitor.  3/30/2020 hemoglobin 15.7, HCT 47.5.  Patient states that she has cut back significantly on her smoking, although not quit yet.  I have encouraged her to continue working on this.  We will continue to closely monitor.  Hemoglobin 14.6 on 6/8/2021 at the meantime he has worsening macrocytosis .6.    Laboratory studies 6/22/2021 reported excellent folate > 20 ng/mL, however low normal B12 level 357 pg/mL.    On 7/6/2021, normal hemoglobin 15.8, MCV  104.9 and HCT 47.2%.  Patient was asked to start oral vitamin B12 at 1000 mcg daily.   9/14/2021 hemoglobin 17.0, hematocrit 52.1.  Monitor.  On 9/28/2021 hemoglobin 16.1 hematocrit 48.5%, continue to monitor.   Lab study on 12/14/2021 reported excellent ferritin 296 and iron saturation 25% with free iron 104 TIBC 424. Hemoglobin was 15.2 with .2.   Normal hemoglobin 14.6 on 3/15/2022.  Iron study reported normal ferritin 129.5 ng/mL however slightly low iron saturation 11%.  Continue to monitor for now.  9/4/2024 hemoglobin is normal at 14.0    5.  Peripheral neuropathy secondary to chemotherapy.   This is mild involving bilateral hands and feet as reported on 9/16/2020.   Will avoid chemotherapy with oxaliplatin.  Stable mild neuropathy as of 11/10/2020  Patient reports worsening peripheral neuropathy and cycle #5 chemotherapy on 12/8/2020 with and without irinotecan.   On 1/5/2021, patient reports improvement of peripheral neuropathy, will add irinotecan back to chemotherapy.  Continue to monitor and adjust medication.  Stable.      6.  Depression.  Patient seen by JULIET Davenport on 11/9/2020.  She was started on mirtazapine.  Lexapro was discontinued.  This has definitely improved her mood with the patient feeling overall much better.  She is sleeping better.  Appetite is improved with her actually eating more than she wants to.    Condition is stable.  She plans to continue follow-up with supportive oncology.    7. Malfunction of the portal catheter  Patient reports there was no blood drawn from the portal catheter. CT scan of the chest on 7/7/2023 reported occlusion of the SVC around to the Port-A-Cath tubing. I recommend trying activase to see if it helps.  Otherwise, we may have to remove the catheter. Clinically, patient has no signs of SVC syndrome.  On 11/03/2023, the patient reports that her Port-A-Cath was able to be used for a CT scan for the injection of intravenous dye; however, there  was no blood return, so we needed to draw from her arm for the blood test. We will continue to keep Port-A-Cath for now.  On 02/09/2024, the patient reports that the port was able to be flushed. However, no blood was returned. We will continue to flush every 6 weeks.  9/4/2024 she continues to have no blood return from her port but can taste saline as we flush at each time and is functioning well otherwise.  Monitor.    *Intractable diarrhea  Patient developed diarrhea on Saturday, 8/24/2024.  Is unclear if this is related to infection as she did have fevers at the time the diarrhea began or chemotherapy.  She does have chronic colitis noted on most recent PET scan, this therefore could be diverticulitis flare  GI panel and C. difficile negative  8/29/2024 she did receive a single dose of octreotide  Begin empiric Flagyl 500 mg 3 times daily x 10 days  8/30/2024 discussed negative stool studies.  We will add Levaquin to already prescribed Flagyl to complete management of diverticulitis.  It is unclear if the patient's symptoms are related to diverticular flare given her previous abdominal pain and fever versus chemotherapy.  However, her symptoms are currently improved  9/4/2024 diarrhea has resolved.  Stools are now more soft with some form but she is having difficulty with passage of stool, having to change positions on the toilet to avoid pain.  We are referring back to Dr. Ye as detailed above.  She will finish out the Flagyl and Levaquin as prescribed having roughly 3 days left of both.  We will also adjust doses of 5-FU/leucovorin today with concern for potential chemotherapy-induced diarrhea.  If she does well this cycle we can increase back to full dose with cycle 3 per Dr. Nguyen.          PLAN:    Proceed with cycle two 5-FU/leucovorin at 30% dose reduction today.  If she does well this cycle we can increase back to full dose next treatment.  Hold bevacizumab today due to potential for invasive procedure  with Dr. Ye.  Refer back to Dr. Padmaja Ye to assess difficulty in passage of stool and rectal pain.  Patient to continue Flagyl and Levaquin as prescribed until complete.  Continue Protonix 40 mg daily  Continue Gas-X  Continue Magic barrier cream rectum as needed  Continue anticoagulation with Lovenox subcu injection every 12 hours.  Return in 2 weeks for follow-up with Dr. Nguyen and cycle three 5-FU/leucovorin with potential to add bevacizumab back into care plan pending follow-up with Dr. Ye.    A liquid biopsy/Guardant reveal study will be conducted to monitor her response together with intermittent imaging studies.      Patient continues on high risk medication requiring close monitoring for toxicity    I spent 50 minutes caring for Carole on this date of service. This time includes time spent by me in the following activities: preparing for the visit, reviewing tests, performing a medically appropriate examination and/or evaluation, counseling and educating the patient/family/caregiver, referring and communicating with other health care professionals, documenting information in the medical record, independently interpreting results and communicating that information with the patient/family/caregiver, care coordination, ordering medications, obtaining a separately obtained history, and reviewing a separately obtained history      Gaby Cruz, APRN  09/04/2024        CC:  WAYNE Worley MD Justin Anderson, MD

## 2024-09-04 NOTE — NURSING NOTE
Per Daksha RODRIGUEZ, okay to use port without blood return. Pt has a previous dye study and will be updated in the near future.

## 2024-09-05 ENCOUNTER — PREP FOR SURGERY (OUTPATIENT)
Dept: OTHER | Facility: HOSPITAL | Age: 44
End: 2024-09-05
Payer: COMMERCIAL

## 2024-09-05 DIAGNOSIS — Z85.048 HISTORY OF RECTAL CANCER: Primary | ICD-10-CM

## 2024-09-06 ENCOUNTER — INFUSION (OUTPATIENT)
Dept: ONCOLOGY | Facility: HOSPITAL | Age: 44
End: 2024-09-06
Payer: COMMERCIAL

## 2024-09-06 DIAGNOSIS — C78.00 MALIGNANT NEOPLASM METASTATIC TO LUNG, UNSPECIFIED LATERALITY: ICD-10-CM

## 2024-09-06 DIAGNOSIS — Z45.2 ENCOUNTER FOR FITTING AND ADJUSTMENT OF VASCULAR CATHETER: ICD-10-CM

## 2024-09-06 DIAGNOSIS — Z79.899 ENCOUNTER FOR LONG-TERM (CURRENT) USE OF HIGH-RISK MEDICATION: Primary | ICD-10-CM

## 2024-09-06 DIAGNOSIS — C20 ADENOCARCINOMA OF RECTUM: ICD-10-CM

## 2024-09-06 PROCEDURE — 25010000002 HEPARIN LOCK FLUSH PER 10 UNITS: Performed by: INTERNAL MEDICINE

## 2024-09-06 RX ORDER — HEPARIN SODIUM (PORCINE) LOCK FLUSH IV SOLN 100 UNIT/ML 100 UNIT/ML
500 SOLUTION INTRAVENOUS AS NEEDED
OUTPATIENT
Start: 2024-09-06

## 2024-09-06 RX ORDER — HEPARIN SODIUM (PORCINE) LOCK FLUSH IV SOLN 100 UNIT/ML 100 UNIT/ML
500 SOLUTION INTRAVENOUS AS NEEDED
Status: DISCONTINUED | OUTPATIENT
Start: 2024-09-06 | End: 2024-09-06 | Stop reason: HOSPADM

## 2024-09-06 RX ORDER — SODIUM CHLORIDE 0.9 % (FLUSH) 0.9 %
10 SYRINGE (ML) INJECTION AS NEEDED
OUTPATIENT
Start: 2024-09-06

## 2024-09-06 RX ORDER — SODIUM CHLORIDE 0.9 % (FLUSH) 0.9 %
10 SYRINGE (ML) INJECTION AS NEEDED
Status: DISCONTINUED | OUTPATIENT
Start: 2024-09-06 | End: 2024-09-06 | Stop reason: HOSPADM

## 2024-09-06 RX ADMIN — Medication 500 UNITS: at 09:12

## 2024-09-06 RX ADMIN — Medication 10 ML: at 09:12

## 2024-09-11 ENCOUNTER — ANESTHESIA (OUTPATIENT)
Dept: GASTROENTEROLOGY | Facility: HOSPITAL | Age: 44
End: 2024-09-11
Payer: COMMERCIAL

## 2024-09-11 ENCOUNTER — HOSPITAL ENCOUNTER (OUTPATIENT)
Facility: HOSPITAL | Age: 44
Setting detail: HOSPITAL OUTPATIENT SURGERY
Discharge: HOME | End: 2024-09-11
Attending: COLON & RECTAL SURGERY | Admitting: COLON & RECTAL SURGERY
Payer: COMMERCIAL

## 2024-09-11 ENCOUNTER — ANESTHESIA EVENT (OUTPATIENT)
Dept: GASTROENTEROLOGY | Facility: HOSPITAL | Age: 44
End: 2024-09-11
Payer: COMMERCIAL

## 2024-09-11 VITALS
HEART RATE: 84 BPM | OXYGEN SATURATION: 95 % | HEIGHT: 66 IN | SYSTOLIC BLOOD PRESSURE: 107 MMHG | DIASTOLIC BLOOD PRESSURE: 82 MMHG | WEIGHT: 194 LBS | RESPIRATION RATE: 12 BRPM | BODY MASS INDEX: 31.18 KG/M2

## 2024-09-11 DIAGNOSIS — Z85.048 HISTORY OF RECTAL CANCER: ICD-10-CM

## 2024-09-11 PROCEDURE — 25810000003 LACTATED RINGERS PER 1000 ML: Performed by: COLON & RECTAL SURGERY

## 2024-09-11 PROCEDURE — 81025 URINE PREGNANCY TEST: CPT | Performed by: COLON & RECTAL SURGERY

## 2024-09-11 PROCEDURE — 25010000002 PROPOFOL 10 MG/ML EMULSION: Performed by: NURSE ANESTHETIST, CERTIFIED REGISTERED

## 2024-09-11 PROCEDURE — 88305 TISSUE EXAM BY PATHOLOGIST: CPT | Performed by: COLON & RECTAL SURGERY

## 2024-09-11 RX ORDER — SODIUM CHLORIDE 9 MG/ML
40 INJECTION, SOLUTION INTRAVENOUS AS NEEDED
Status: DISCONTINUED | OUTPATIENT
Start: 2024-09-11 | End: 2024-09-11 | Stop reason: HOSPADM

## 2024-09-11 RX ORDER — PROPOFOL 10 MG/ML
VIAL (ML) INTRAVENOUS AS NEEDED
Status: DISCONTINUED | OUTPATIENT
Start: 2024-09-11 | End: 2024-09-11 | Stop reason: SURG

## 2024-09-11 RX ORDER — SODIUM CHLORIDE, SODIUM LACTATE, POTASSIUM CHLORIDE, CALCIUM CHLORIDE 600; 310; 30; 20 MG/100ML; MG/100ML; MG/100ML; MG/100ML
30 INJECTION, SOLUTION INTRAVENOUS CONTINUOUS PRN
Status: DISCONTINUED | OUTPATIENT
Start: 2024-09-11 | End: 2024-09-11 | Stop reason: HOSPADM

## 2024-09-11 RX ORDER — LIDOCAINE HYDROCHLORIDE 20 MG/ML
INJECTION, SOLUTION INFILTRATION; PERINEURAL AS NEEDED
Status: DISCONTINUED | OUTPATIENT
Start: 2024-09-11 | End: 2024-09-11 | Stop reason: SURG

## 2024-09-11 RX ORDER — SODIUM CHLORIDE 0.9 % (FLUSH) 0.9 %
10 SYRINGE (ML) INJECTION AS NEEDED
Status: DISCONTINUED | OUTPATIENT
Start: 2024-09-11 | End: 2024-09-11 | Stop reason: HOSPADM

## 2024-09-11 RX ORDER — SODIUM CHLORIDE 0.9 % (FLUSH) 0.9 %
10 SYRINGE (ML) INJECTION EVERY 12 HOURS SCHEDULED
Status: DISCONTINUED | OUTPATIENT
Start: 2024-09-11 | End: 2024-09-11 | Stop reason: HOSPADM

## 2024-09-11 RX ORDER — HYDROCORTISONE 25 MG/G
CREAM TOPICAL
Qty: 30 G | Refills: 1 | Status: SHIPPED | OUTPATIENT
Start: 2024-09-11

## 2024-09-11 RX ADMIN — SODIUM CHLORIDE, POTASSIUM CHLORIDE, SODIUM LACTATE AND CALCIUM CHLORIDE 30 ML/HR: 600; 310; 30; 20 INJECTION, SOLUTION INTRAVENOUS at 11:27

## 2024-09-11 RX ADMIN — LIDOCAINE HYDROCHLORIDE 100 MG: 20 INJECTION, SOLUTION INFILTRATION; PERINEURAL at 12:14

## 2024-09-11 RX ADMIN — PROPOFOL 150 MG: 10 INJECTION, EMULSION INTRAVENOUS at 12:14

## 2024-09-11 RX ADMIN — PROPOFOL 100 MCG/KG/MIN: 10 INJECTION, EMULSION INTRAVENOUS at 12:17

## 2024-09-11 NOTE — H&P
Carole Weaver is a 44 y.o. female  who is referred by Geronimo Ye MD for a colonoscopy. She   has an indications: abnl pet scan.  History of rectal cancer  .     She denies any change in bowel function, melena, or hematochezia.    Past Medical History:   Diagnosis Date    Abdominopelvic abscess 08/12/2020    ADMITTED TO [facility]    Abnormal Pap smear of cervix 02/02/1998    JULIUS I    Abscess of abdominal wall 11/28/2012    SEEN AT [facility] ER    Anemia in pregnancy     Anxiety     Bilateral epiphora 04/2020    Bilateral hand swelling 05/02/2020    SEEN AT [facility] ER    Cervical lymphadenitis 06/06/2012    SEEN AT [facility] ER    Chemotherapy induced diarrhea 01/2021    Chemotherapy induced neutropenia 04/2020    Chemotherapy-induced nausea 02/2020    Chemotherapy-induced thrombocytopenia 05/2020    Chronic anticoagulation     Chronic diarrhea     Colon polyps     FOLLOWED BY DR. 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 ALEXANDRU HUNT    History of gestational diabetes     History of pre-eclampsia 1998    History of pre-eclampsia     History of radiation therapy 2019    FOLLOWED BY DR. JAVON LEWIS    History of TB skin testing 01/17/2009    TB Skin Test    History of TB skin testing 01/17/2009    TB Skin Test    History of transfusion 2019 12/2019    HPV in female 1998    Hypokalemia 09/2019    Hypomagnesemia 09/2019    Hyponatremia 06/2021    IBS (irritable  bowel syndrome)     Ileostomy present 2020    Take down on 11/3/2022    Infected insect bite of neck 2016    SEEN AT [facility]    Kidney stones 2007    SEEN AT [facility] ER    Low anterior resection syndrome 2022    FOLLOWED BY DR. PADMAJA ESPARZA    Lump of right breast     SWOLLEN LYMPH NODE    Lung cancer 2020    METASTATIC LUNG CANCER    Macrocytosis 2021    Monopolar depression     On anticoagulant therapy     Pain associated with surgical procedure 2020    Palmar-plantar erythrodysesthesia 2021    Perirectal abscess 2020    Pulmonary embolism without acute cor pulmonale 09/20/2019    X 3; ADMITTED TO [facility]    Pulmonary nodule, right 2020    Rectal cancer 2019    STAGE IIA, INVASIVE MODERATELY DIFFERENTIATED ADENOCARCINOMA, CHEMO AND XRT FINISHED 2019    Right shoulder pain 2020    SEEN AT [facility] ER    Right ureteral stone 10/01/2019    SEEN AT [facility] ER    SAB (spontaneous ) 1996     A2-1 INDUCED    Sciatica     Sepsis due to cellulitis 2002    LEFT GREAT TOE, ADMITTED TO [facility]    Tachycardia 2020    Thrombosis of superior vena cava 2020    AND BRACHIOCEPHALIC VEIN, ADMITTED TO [facility]    Urinary urgency 2020       Past Surgical History:   Procedure Laterality Date    ABDOMINAL SURGERY      CHOLECYSTECTOMY N/A 10/10/2003    LAPAROSCOPIC WITH CHOLANGIOGRAM, DR. JAMEY TALAVERA AT [facility]    COLON RESECTION N/A 2019    Procedure: laparoscopic low anterior colon resection with mobilization of splenic flexure and diverting loop ileostomy: ERAS;  Surgeon: Padmaja Esparza MD;  Location: [facility] MAIN OR;  Service: General    COLON RESECTION N/A 2020    Procedure: LOW ANTERIOR COLON RESECTION, COLOANAL ANASTOMOSIS, MOBILIZATION SPLENIC FLEXURE;  Surgeon: Padmaja Esparza MD;  Location: [facility] MAIN OR;  Service: General;  Laterality: N/A;    COLONOSCOPY N/A 07/15/2020    PATENT ANASTAMOSIS IN MID RECTUM, RESCOPE IN 1 YR, DR. MELTON  VILMA AT [facility]    COLONOSCOPY N/A 11/03/2022    DIFFUSE AREA OF MODERATELY FRIABLE MUCOSA IN ENTIRE COLON , DIVERSION COLITIS, PATENT AND HEALTHY ANASTAMOSIS, DR. GERONIMO ESPARZA AT [facility]    COLONOSCOPY N/A 12/04/2023    6 MM SESSILE SERRATED ADENOMA POLYP IN DESCENDING, PATENT ANASTAMOSIS IN DISTAL RECTUM, RESCOPE IN 2 YRS, DR. GERONIMO ESPARZA AT [facility]    COLONOSCOPY W/ POLYPECTOMY N/A 07/08/2019    15 MM TUBULOVILLOUS ADENOMA POLYP IN HEPATIC FLEXURE, 20 MMTUBULOVILLOUS ADENOMA WITH HIGH GRADE DYSPLASIA IN RECTOSIGMOID, 4 CM MASS IN MID RECTUM, PATH: INVASIVE MODERATELY DIFFERENTIATED ADENOCARCINOMA, DR. JENNIFER LI AT [facility]    DILATATION AND EVACUATION N/A 2009    ENDOSCOPY N/A 07/08/2019    LA GRADE A ESOPHAGITIS, GASTRITIS, ALL BIOPSIES BENIGN, DR. JENNIFER LI AT [facility]    ILEOSTOMY CLOSURE N/A 11/14/2022    Procedure: ILEOSTOMY TAKEDOWN;  Surgeon: Geronimo Esparza MD;  Location: [facility];  Service: General;  Laterality: N/A;    INCISION AND DRAINAGE PERIRECTAL ABSCESS N/A 08/14/2020    Procedure: INCISION AND DRAINAGE OF retrorectal dehiscence abcess with drain placement and irrigation;  Surgeon: Geornimo Esparza MD;  Location: [facility] OR;  Service: General;  Laterality: N/A;    PAP SMEAR N/A 01/24/2014    SIGMOIDOSCOPY N/A 07/24/2019    INFILTRATIVE PARTIALLY OBSTRUCTING LARGE RECTAL CANCER, AREA TATOOED, DR. GERONIMO ESPARZA AT [facility]    SIGMOIDOSCOPY N/A 11/23/2019    AN ULCERATED PARTIALLY OBSTRUCTING MASS IN MID RECTUM, AREA TATTOOED, DR. GERONIMO ESPARZA AT [facility]    SIGMOIDOSCOPY N/A 07/22/2021    PATENT ANASTAMOSIS IN DISTAL RECTUM, RESCOPE IN 1 YR, DR. GERONIMO ESPARZA AT [facility]    TRANSRECTAL ULTRASOUND N/A 07/24/2019    Procedure: ULTRASOUND TRANSRECTAL;  Surgeon: Geronimo Esparza MD;  Location: [facility] ENDOSCOPY;  Service: General    URETEROSCOPY LASER LITHOTRIPSY WITH STENT INSERTION Right 10/30/2019    DR. ESTUARDO BEASLEY AT [facility]    VAGINAL DELIVERY N/A 09/18/1998    VENOUS ACCESS  DEVICE (PORT) INSERTION Left 08/09/2019    Procedure: INSERTION VENOUS ACCESS DEVICE;  Surgeon: Sj Joseph MD;  [facility];  Service: General    VENOUS ACCESS DEVICE (PORT) INSERTION N/A 12/18/2020    Procedure: INSERTION VENOUS ACCESS DEVICE right side, removal venous access device left side;  Surgeon: Sj Joseph MD;  [facility];  Service: General;  Laterality: N/A;    WISDOM TOOTH EXTRACTION Bilateral 1993       Medications Prior to Admission   Medication Sig Dispense Refill Last Dose    clonazePAM (KlonoPIN) 1 MG tablet Take 1 tablet as needed daily for anxiety. May take one additional as needed for severe anxiety. (Patient taking differently: Take 1 tablet by mouth Every Evening. Take 1 tablet as needed daily for anxiety. May take one additional as needed for severe anxiety.) 45 tablet 4 9/11/2024    Cyanocobalamin (VITAMIN B-12 PO) Take 1 tablet by mouth 3 (Three) Times a Week. M-W-F   9/10/2024    dicyclomine (BENTYL) 20 MG tablet TAKE 1 TABLET BY MOUTH EVERY 6 (SIX) HOURS AS NEEDED FOR IRRITABLE BOWEL SYMPTOMS (Patient taking differently: Take 1 tablet by mouth Every Evening.) 360 tablet 2 9/10/2024    diphenoxylate-atropine (LOMOTIL) 2.5-0.025 MG per tablet TAKE 2 TABLETS BY MOUTH 4 TIMES A DAY (Patient taking differently: 2 tablets 4 (Four) Times a Day.) 240 tablet 0 9/10/2024    Enoxaparin Sodium (LOVENOX) 100 MG/ML solution prefilled syringe syringe Inject 1 mL under the skin into the appropriate area as directed Every 12 (Twelve) Hours. 60 mL 2 9/10/2024    escitalopram (LEXAPRO) 10 MG tablet Take 1 tablet by mouth Daily. (Patient taking differently: Take 1 tablet by mouth Every Evening.) 90 tablet 1 9/10/2024    famotidine (PEPCID) 20 MG tablet TAKE 1 TABLET BY MOUTH TWICE A DAY (Patient taking differently: Take 1 tablet by mouth Every Evening.) 180 tablet 2 9/10/2024    Imvexxy Maintenance Pack 10 MCG insert Insert 10 mcg into the vagina 2 (Two) Times a Week.    Past Week    Loperamide HCl (IMODIUM PO) Take  by mouth As Needed.       Loratadine 10 MG capsule Take 1 capsule by mouth Every Evening.   9/10/2024    metoprolol succinate XL (TOPROL-XL) 25 MG 24 hr tablet TAKE 0.5 TABLETS BY MOUTH EVERY NIGHT 45 tablet 2 9/10/2024    nystatin-hydrocortisone-bacitracin in zinc oxide ointment Apply  topically to the appropriate area as directed Daily. 60 g 2     ondansetron (ZOFRAN) 8 MG tablet TAKE 1 TABLET BY MOUTH THREE TIMES A DAY AS NEEDED FOR NAUSEA AND VOMITING 60 tablet 1 9/10/2024    pantoprazole (Protonix) 40 MG EC tablet Take 1 tablet by mouth Daily. (Patient taking differently: Take 1 tablet by mouth Every Evening.) 30 tablet 1 9/10/2024    rosuvastatin (CRESTOR) 5 MG tablet Take 1 tablet by mouth Daily. (Patient taking differently: Take 1 tablet by mouth Every Night.) 90 tablet 0 9/10/2024       No Known Allergies    Family History   Problem Relation Age of Onset    Hyperlipidemia Mother     Colon polyps Mother     Heart disease Mother     Hypertension Mother     Factor V Leiden deficiency Mother     Skin cancer Father         Squamous in 60s    Atrial fibrillation Father     Drug abuse Sister     Mental illness Sister         Addiction    No Known Problems Son     Colon cancer Maternal Uncle     Cancer Paternal Uncle     Colon cancer Paternal Uncle     Diabetes Paternal Uncle     Heart disease Paternal Grandfather     Cancer Paternal Aunt         OVARIAN    Malig Hyperthermia Neg Hx        Social History     Socioeconomic History    Marital status:      Spouse name: Justus    Number of children: 1    Years of education: College   Tobacco Use    Smoking status: Every Day     Current packs/day: 1.00     Average packs/day: 1 pack/day for 25.0 years (25.0 ttl pk-yrs)     Types: Cigarettes     Passive exposure: Current    Smokeless tobacco: Never    Tobacco comments:     1 PPD/caffeine use    Vaping Use    Vaping status: Some Days    Substances: Nicotine    Devices:  Disposable    Passive vaping exposure: Yes   Substance and Sexual Activity    Alcohol use: Not Currently     Comment: RARELY    Drug use: Never    Sexual activity: Yes     Partners: Male     Comment: .       ROS    Vitals:    09/11/24 1118   BP: 117/79   Pulse: 97   Resp: 23   SpO2: 96%         Physical Exam  Constitutional:       General: She is not in acute distress.     Appearance: She is well-developed.   HENT:      Head: Normocephalic and atraumatic.   Abdominal:      General: There is no distension.      Palpations: Abdomen is soft.   Musculoskeletal:         General: Normal range of motion.   Neurological:      Mental Status: She is alert.   Psychiatric:         Thought Content: Thought content normal.           Assessment & Plan      indications: abnl pet scan.  History of rectal cancer         I recommend flex sigmiodoscopy.  I described risks, benefits of the procedure with the patient including but not limited to bleeding, infection, possibility of perforation and possible polypectomy. All of the patient's questions were answered and they would like to proceed with the above recommendations.

## 2024-09-11 NOTE — ANESTHESIA PREPROCEDURE EVALUATION
Anesthesia Evaluation     Patient summary reviewed and Nursing notes reviewed   NPO Solid Status: > 8 hours             Airway   Mallampati: II  TM distance: >3 FB  Neck ROM: full  Dental      Pulmonary    (+) pulmonary embolism, a smoker Current, lung cancer, COPD,  Cardiovascular     ECG reviewed  PT is on anticoagulation therapy  Patient on routine beta blocker  Rhythm: regular  Rate: normal    (+) hypertension, valvular problems/murmurs, CAD      Neuro/Psych  (+) numbness, psychiatric history  GI/Hepatic/Renal/Endo    (+) GERD, GI bleeding , renal disease- stones    Musculoskeletal     (+) neck pain  Abdominal    Substance History - negative use     OB/GYN negative ob/gyn ROS         Other      history of cancer                  Anesthesia Plan    ASA 3     MAC     intravenous induction     Anesthetic plan, risks, benefits, and alternatives have been provided, discussed and informed consent has been obtained with: patient.    CODE STATUS:

## 2024-09-11 NOTE — ANESTHESIA POSTPROCEDURE EVALUATION
Patient: Carole Weaver    Procedure Summary       Date: 09/11/24 Room / Location: [facility] ENDOSCOPY 6 /  [facility]    Anesthesia Start: 1208 Anesthesia Stop: 1233    Procedure: FLEXIBLE SIGMOIDOSCOPY with biopsies Diagnosis:       History of rectal cancer      (History of rectal cancer [Z85.048])    Surgeons: Padmaja Ye MD Provider: Pablo Reyes MD    Anesthesia Type: MAC ASA Status: 3            Anesthesia Type: MAC    Vitals  Vitals Value Taken Time   /82 09/11/24 1243   Temp     Pulse 81 09/11/24 1244   Resp 12 09/11/24 1243   SpO2 96 % 09/11/24 1244   Vitals shown include unfiled device data.        Post Anesthesia Care and Evaluation    Patient location during evaluation: bedside  Patient participation: complete - patient participated  Level of consciousness: awake  Pain management: adequate    Airway patency: patent  Anesthetic complications: No anesthetic complications  PONV Status: controlled  Cardiovascular status: acceptable  Respiratory status: acceptable  Hydration status: acceptable    Comments: --------------------            09/11/24               1243     --------------------   BP:       107/82     Pulse:      84       Resp:       12       SpO2:      95%      --------------------

## 2024-09-11 NOTE — DISCHARGE INSTRUCTIONS
For the next 24 hours patient needs to be with a responsible adult.    For THE REST OF TODAY DO NOT drive, operate machinery, appliances, drink alcohol, make important decisions or sign legal documents.    Start with a light or bland diet if you are feeling sick to your stomach otherwise advance to regular diet as tolerated.    Follow recommendations on procedure report if provided by your doctor.    Call Dr Ye for problems [phone]    Problems may include but not limited to: large amounts of bleeding, trouble breathing, repeated vomiting, severe unrelieved pain, fever or chills.      If biopsies or polyps were taken, MD will call you with the results in about 7 days. If you don't hear from the MD in 2 weeks, call the number above.

## 2024-09-12 ENCOUNTER — TELEPHONE (OUTPATIENT)
Dept: SURGERY | Facility: CLINIC | Age: 44
End: 2024-09-12
Payer: COMMERCIAL

## 2024-09-12 LAB
CYTO UR: NORMAL
LAB AP CASE REPORT: NORMAL
PATH REPORT.FINAL DX SPEC: NORMAL
PATH REPORT.GROSS SPEC: NORMAL

## 2024-09-12 NOTE — TELEPHONE ENCOUNTER
Padmaja Ye MD Parks, Dawn Michelle, LPN  Please let her know that biopsy neg for inflammation and no cancer.    Can try the pepto tid for 1 wk to see if helps with diarrhea.  Will turn stool black.

## 2024-09-16 ENCOUNTER — SPECIALTY PHARMACY (OUTPATIENT)
Dept: PHARMACY | Facility: HOSPITAL | Age: 44
End: 2024-09-16
Payer: COMMERCIAL

## 2024-09-16 ENCOUNTER — INFUSION (OUTPATIENT)
Dept: ONCOLOGY | Facility: HOSPITAL | Age: 44
End: 2024-09-16
Payer: COMMERCIAL

## 2024-09-16 ENCOUNTER — TELEPHONE (OUTPATIENT)
Dept: ONCOLOGY | Facility: CLINIC | Age: 44
End: 2024-09-16
Payer: COMMERCIAL

## 2024-09-16 DIAGNOSIS — T45.1X5A ADVERSE EFFECT OF ANTINEOPLASTIC AND IMMUNOSUPPRESSIVE DRUGS, INITIAL ENCOUNTER: ICD-10-CM

## 2024-09-16 DIAGNOSIS — T45.1X5A CHEMOTHERAPY INDUCED DIARRHEA: Primary | ICD-10-CM

## 2024-09-16 DIAGNOSIS — K52.1 CHEMOTHERAPY INDUCED DIARRHEA: Primary | ICD-10-CM

## 2024-09-16 PROCEDURE — 25010000002 OCTREOTIDE PER 25 MCG: Performed by: INTERNAL MEDICINE

## 2024-09-16 PROCEDURE — 96372 THER/PROPH/DIAG INJ SC/IM: CPT

## 2024-09-16 RX ORDER — OCTREOTIDE ACETATE 500 UG/ML
150 INJECTION, SOLUTION INTRAVENOUS; SUBCUTANEOUS ONCE
Status: COMPLETED | OUTPATIENT
Start: 2024-09-16 | End: 2024-09-16

## 2024-09-16 RX ORDER — OCTREOTIDE ACETATE 200 UG/ML
150 INJECTION INTRAVENOUS ONCE
Status: DISCONTINUED | OUTPATIENT
Start: 2024-09-16 | End: 2024-09-16

## 2024-09-16 RX ORDER — OCTREOTIDE ACETATE 200 UG/ML
150 INJECTION INTRAVENOUS ONCE
Status: CANCELLED | OUTPATIENT
Start: 2024-09-16 | End: 2024-09-16

## 2024-09-16 RX ORDER — OCTREOTIDE ACETATE 100 UG/ML
150 INJECTION, SOLUTION INTRAVENOUS; SUBCUTANEOUS 3 TIMES DAILY
Qty: 120 ML | Refills: 0 | Status: SHIPPED | OUTPATIENT
Start: 2024-09-16 | End: 2024-09-18 | Stop reason: SDUPTHER

## 2024-09-16 RX ADMIN — OCTREOTIDE ACETATE 150 MCG: 500 INJECTION, SOLUTION INTRAVENOUS; SUBCUTANEOUS at 15:40

## 2024-09-18 ENCOUNTER — INFUSION (OUTPATIENT)
Dept: ONCOLOGY | Facility: HOSPITAL | Age: 44
End: 2024-09-18
Payer: COMMERCIAL

## 2024-09-18 ENCOUNTER — LAB (OUTPATIENT)
Dept: LAB | Facility: HOSPITAL | Age: 44
End: 2024-09-18
Payer: COMMERCIAL

## 2024-09-18 ENCOUNTER — SPECIALTY PHARMACY (OUTPATIENT)
Dept: PHARMACY | Facility: HOSPITAL | Age: 44
End: 2024-09-18
Payer: COMMERCIAL

## 2024-09-18 ENCOUNTER — TELEPHONE (OUTPATIENT)
Dept: ONCOLOGY | Facility: CLINIC | Age: 44
End: 2024-09-18

## 2024-09-18 ENCOUNTER — OFFICE VISIT (OUTPATIENT)
Dept: ONCOLOGY | Facility: CLINIC | Age: 44
End: 2024-09-18
Payer: COMMERCIAL

## 2024-09-18 VITALS
HEIGHT: 66 IN | BODY MASS INDEX: 30.86 KG/M2 | SYSTOLIC BLOOD PRESSURE: 117 MMHG | HEART RATE: 84 BPM | OXYGEN SATURATION: 96 % | DIASTOLIC BLOOD PRESSURE: 84 MMHG | TEMPERATURE: 98 F | WEIGHT: 192 LBS

## 2024-09-18 DIAGNOSIS — Z79.01 ON ANTICOAGULANT THERAPY: ICD-10-CM

## 2024-09-18 DIAGNOSIS — K52.1 CHEMOTHERAPY INDUCED DIARRHEA: ICD-10-CM

## 2024-09-18 DIAGNOSIS — C20 ADENOCARCINOMA OF RECTUM: ICD-10-CM

## 2024-09-18 DIAGNOSIS — T45.1X5A CHEMOTHERAPY INDUCED DIARRHEA: Primary | ICD-10-CM

## 2024-09-18 DIAGNOSIS — C78.00 MALIGNANT NEOPLASM METASTATIC TO LUNG, UNSPECIFIED LATERALITY: ICD-10-CM

## 2024-09-18 DIAGNOSIS — T45.1X5A CHEMOTHERAPY INDUCED DIARRHEA: ICD-10-CM

## 2024-09-18 DIAGNOSIS — Z79.899 ENCOUNTER FOR LONG-TERM (CURRENT) USE OF HIGH-RISK MEDICATION: ICD-10-CM

## 2024-09-18 DIAGNOSIS — Z79.899 ENCOUNTER FOR LONG-TERM (CURRENT) USE OF HIGH-RISK MEDICATION: Primary | ICD-10-CM

## 2024-09-18 DIAGNOSIS — D68.51 HETEROZYGOUS FACTOR V LEIDEN MUTATION: Chronic | ICD-10-CM

## 2024-09-18 DIAGNOSIS — I26.99 PULMONARY EMBOLISM WITHOUT ACUTE COR PULMONALE, UNSPECIFIED CHRONICITY, UNSPECIFIED PULMONARY EMBOLISM TYPE: ICD-10-CM

## 2024-09-18 DIAGNOSIS — K52.1 CHEMOTHERAPY INDUCED DIARRHEA: Primary | ICD-10-CM

## 2024-09-18 DIAGNOSIS — T45.1X5A ADVERSE EFFECT OF ANTINEOPLASTIC AND IMMUNOSUPPRESSIVE DRUGS, INITIAL ENCOUNTER: ICD-10-CM

## 2024-09-18 DIAGNOSIS — Z79.01 ANTICOAGULATION ADEQUATE: ICD-10-CM

## 2024-09-18 DIAGNOSIS — Z79.899 ON ANTINEOPLASTIC CHEMOTHERAPY: ICD-10-CM

## 2024-09-18 LAB
ALBUMIN SERPL-MCNC: 3.9 G/DL (ref 3.5–5.2)
ALBUMIN/GLOB SERPL: 1.3 G/DL
ALP SERPL-CCNC: 107 U/L (ref 39–117)
ALT SERPL W P-5'-P-CCNC: 53 U/L (ref 1–33)
ANION GAP SERPL CALCULATED.3IONS-SCNC: 13.1 MMOL/L (ref 5–15)
AST SERPL-CCNC: 46 U/L (ref 1–32)
BASOPHILS # BLD AUTO: 0.02 10*3/MM3 (ref 0–0.2)
BASOPHILS NFR BLD AUTO: 0.3 % (ref 0–1.5)
BILIRUB SERPL-MCNC: 0.2 MG/DL (ref 0–1.2)
BUN SERPL-MCNC: 6 MG/DL (ref 6–20)
BUN/CREAT SERPL: 7.7 (ref 7–25)
CALCIUM SPEC-SCNC: 9.1 MG/DL (ref 8.6–10.5)
CHLORIDE SERPL-SCNC: 104 MMOL/L (ref 98–107)
CO2 SERPL-SCNC: 24.9 MMOL/L (ref 22–29)
CREAT SERPL-MCNC: 0.78 MG/DL (ref 0.57–1)
DEPRECATED RDW RBC AUTO: 51.4 FL (ref 37–54)
EGFRCR SERPLBLD CKD-EPI 2021: 95.6 ML/MIN/1.73
EOSINOPHIL # BLD AUTO: 0.23 10*3/MM3 (ref 0–0.4)
EOSINOPHIL NFR BLD AUTO: 3.9 % (ref 0.3–6.2)
ERYTHROCYTE [DISTWIDTH] IN BLOOD BY AUTOMATED COUNT: 14.7 % (ref 12.3–15.4)
GLOBULIN UR ELPH-MCNC: 2.9 GM/DL
GLUCOSE SERPL-MCNC: 118 MG/DL (ref 65–99)
HCT VFR BLD AUTO: 45 % (ref 34–46.6)
HGB BLD-MCNC: 14.9 G/DL (ref 12–15.9)
IMM GRANULOCYTES # BLD AUTO: 0.01 10*3/MM3 (ref 0–0.05)
IMM GRANULOCYTES NFR BLD AUTO: 0.2 % (ref 0–0.5)
LYMPHOCYTES # BLD AUTO: 1.12 10*3/MM3 (ref 0.7–3.1)
LYMPHOCYTES NFR BLD AUTO: 19.1 % (ref 19.6–45.3)
MCH RBC QN AUTO: 31.6 PG (ref 26.6–33)
MCHC RBC AUTO-ENTMCNC: 33.1 G/DL (ref 31.5–35.7)
MCV RBC AUTO: 95.5 FL (ref 79–97)
MONOCYTES # BLD AUTO: 0.48 10*3/MM3 (ref 0.1–0.9)
MONOCYTES NFR BLD AUTO: 8.2 % (ref 5–12)
NEUTROPHILS NFR BLD AUTO: 3.99 10*3/MM3 (ref 1.7–7)
NEUTROPHILS NFR BLD AUTO: 68.3 % (ref 42.7–76)
NRBC BLD AUTO-RTO: 0 /100 WBC (ref 0–0.2)
PLATELET # BLD AUTO: 164 10*3/MM3 (ref 140–450)
PMV BLD AUTO: 9.4 FL (ref 6–12)
POTASSIUM SERPL-SCNC: 3.8 MMOL/L (ref 3.5–5.2)
PROT SERPL-MCNC: 6.8 G/DL (ref 6–8.5)
RBC # BLD AUTO: 4.71 10*6/MM3 (ref 3.77–5.28)
SODIUM SERPL-SCNC: 142 MMOL/L (ref 136–145)
WBC NRBC COR # BLD AUTO: 5.85 10*3/MM3 (ref 3.4–10.8)

## 2024-09-18 PROCEDURE — 96372 THER/PROPH/DIAG INJ SC/IM: CPT

## 2024-09-18 PROCEDURE — 80053 COMPREHEN METABOLIC PANEL: CPT

## 2024-09-18 PROCEDURE — G0498 CHEMO EXTEND IV INFUS W/PUMP: HCPCS

## 2024-09-18 PROCEDURE — 96367 TX/PROPH/DG ADDL SEQ IV INF: CPT

## 2024-09-18 PROCEDURE — 96375 TX/PRO/DX INJ NEW DRUG ADDON: CPT

## 2024-09-18 PROCEDURE — 25010000002 PALONOSETRON PER 25 MCG: Performed by: INTERNAL MEDICINE

## 2024-09-18 PROCEDURE — 25010000002 LEUCOVORIN CALCIUM PER 50 MG: Performed by: INTERNAL MEDICINE

## 2024-09-18 PROCEDURE — 25010000002 OCTREOTIDE PER 25 MCG: Performed by: INTERNAL MEDICINE

## 2024-09-18 PROCEDURE — 25810000003 SODIUM CHLORIDE 0.9 % SOLUTION 250 ML FLEX CONT: Performed by: INTERNAL MEDICINE

## 2024-09-18 PROCEDURE — 25810000003 SODIUM CHLORIDE 0.9 % SOLUTION: Performed by: INTERNAL MEDICINE

## 2024-09-18 PROCEDURE — 25010000002 FLUOROURACIL PER 500 MG: Performed by: INTERNAL MEDICINE

## 2024-09-18 PROCEDURE — 96365 THER/PROPH/DIAG IV INF INIT: CPT

## 2024-09-18 PROCEDURE — 99215 OFFICE O/P EST HI 40 MIN: CPT | Performed by: INTERNAL MEDICINE

## 2024-09-18 PROCEDURE — 85025 COMPLETE CBC W/AUTO DIFF WBC: CPT

## 2024-09-18 PROCEDURE — 25010000002 DEXAMETHASONE SODIUM PHOSPHATE 100 MG/10ML SOLUTION: Performed by: INTERNAL MEDICINE

## 2024-09-18 PROCEDURE — 25010000002 FOSAPREPITANT PER 1 MG: Performed by: INTERNAL MEDICINE

## 2024-09-18 RX ORDER — OCTREOTIDE ACETATE 200 UG/ML
150 INJECTION INTRAVENOUS ONCE
Status: DISCONTINUED | OUTPATIENT
Start: 2024-09-18 | End: 2024-09-18

## 2024-09-18 RX ORDER — PALONOSETRON 0.05 MG/ML
0.25 INJECTION, SOLUTION INTRAVENOUS ONCE
Status: CANCELLED | OUTPATIENT
Start: 2024-09-18

## 2024-09-18 RX ORDER — OCTREOTIDE ACETATE 500 UG/ML
150 INJECTION, SOLUTION INTRAVENOUS; SUBCUTANEOUS ONCE
Status: COMPLETED | OUTPATIENT
Start: 2024-09-18 | End: 2024-09-18

## 2024-09-18 RX ORDER — SODIUM CHLORIDE 9 MG/ML
20 INJECTION, SOLUTION INTRAVENOUS ONCE
Status: COMPLETED | OUTPATIENT
Start: 2024-09-18 | End: 2024-09-18

## 2024-09-18 RX ORDER — OCTREOTIDE ACETATE 100 UG/ML
150 INJECTION, SOLUTION INTRAVENOUS; SUBCUTANEOUS 3 TIMES DAILY
Qty: 120 ML | Refills: 0 | Status: SHIPPED | OUTPATIENT
Start: 2024-09-18

## 2024-09-18 RX ORDER — HYDROCORTISONE 2.5 %
CREAM (GRAM) TOPICAL
COMMUNITY
Start: 2024-09-11

## 2024-09-18 RX ORDER — SODIUM CHLORIDE 9 MG/ML
20 INJECTION, SOLUTION INTRAVENOUS ONCE
Status: CANCELLED | OUTPATIENT
Start: 2024-09-18

## 2024-09-18 RX ORDER — PALONOSETRON 0.05 MG/ML
0.25 INJECTION, SOLUTION INTRAVENOUS ONCE
Status: COMPLETED | OUTPATIENT
Start: 2024-09-18 | End: 2024-09-18

## 2024-09-18 RX ADMIN — SODIUM CHLORIDE 20 ML/HR: 9 INJECTION, SOLUTION INTRAVENOUS at 09:20

## 2024-09-18 RX ADMIN — FLUOROURACIL 3360 MG: 50 INJECTION, SOLUTION INTRAVENOUS at 10:57

## 2024-09-18 RX ADMIN — LEUCOVORIN CALCIUM 560 MG: 350 INJECTION, POWDER, LYOPHILIZED, FOR SUSPENSION INTRAMUSCULAR; INTRAVENOUS at 10:21

## 2024-09-18 RX ADMIN — FOSAPREPITANT 100 ML: 150 INJECTION, POWDER, LYOPHILIZED, FOR SOLUTION INTRAVENOUS at 09:39

## 2024-09-18 RX ADMIN — OCTREOTIDE ACETATE 150 MCG: 500 INJECTION, SOLUTION INTRAVENOUS; SUBCUTANEOUS at 14:59

## 2024-09-18 RX ADMIN — PALONOSETRON HYDROCHLORIDE 0.25 MG: 0.25 INJECTION INTRAVENOUS at 09:20

## 2024-09-18 RX ADMIN — DEXAMETHASONE SODIUM PHOSPHATE 12 MG: 10 INJECTION, SOLUTION INTRAMUSCULAR; INTRAVENOUS at 09:20

## 2024-09-18 NOTE — PROGRESS NOTES
REASON FOR FOLLOW UP:     Rectal cancer, rectal ultrasound examination in July 2019 reported T3N0 disease without lymphadenopathy. She does have small pulmonary nodule 6-7 mm and 2 mm with indeterminate feature. There is no solid evidence of metastatic disease otherwise. Patient has stage IIA (T3N0M0) disease.  The patient is heterozygous factor V Leiden, was on prophylactic anticoagulation with Lovenox 40 mg daily given her increased risk of thrombosis with MediPort and GI malignancy.   PET scan on 8/8/2019 reported an 8 mm hypermetabolic right upper lobe pulmonary nodule, which is suspicious for metastatic as well.    Patient had a PowerPort placement on the left upper chest by Dr. Joseph on 8/9/2019.  Patient was started on concurrent infusional 5-FU chemoradiation therapy on 8/12/2019, with planned complete surgical resection and further adjuvant chemotherapy with intention to cure the disease.   Patient underwent surgical resection of the primary rectal cancer by Dr. Ye on 12/2/2019.  Pathology evaluation reported residual T3N1 disease stage IIIb.  Diarrhea related to therapy and radiation.   Patient was started cycle 1 day 1 of adjuvant FOLFOX 6 on 1/23/2020.  On 2/5/2020 FOLFOX 6 cycle 2 delayed secondary to neutropenia.  Patient was given 3 days of Granix injection.  After cycle #2 we planned 3 days of Granix with each cycle.   Patient also intolerant of oral iron.  Patient received 2 doses of IV injectafer on 02/05/2020 and 02/12/2020.   02/12/2020 Proceed with cycle #2 of FOLFOX 6 with 3 days of Granix.  On 3/11/2020 cycle 4 postponed secondary to abdominal pain and occasional low-grade fevers.  CT scans ordered.  Cycle #4 resumed after CT scan revealed no evidence of disease.  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 Ye. patient has no fever.  Continue to observe.   Grade 3 hand-foot syndrome secondary to 5-FU after cycle #7  FOLFOX 6 chemotherapy, prompting ER visit.  Also has worsening peripheral neuropathy.   Cycle #8 FOLFOX 6 was given on 5/13/2020, with 50% dose reduction for both 5-FU and oxaliplatin, and also examination of bolus 5-FU.   PET scan examination on 5/21/2020 reported no evidence of metastatic disease in the chest abdomen pelvis.  Stable 6 mm RUL pulmonary nodule.  On 5/27/2020, I discussed with the patient that we can discontinue chemotherapy at this time.   Patient had a surgical procedure for low anterior colon resection, coloanal anastomosis on 8/3/2020.  CT scan for chest abdomen pelvis on 9/9/2020 reported a new 8 mm noncalcified pulmonary nodule in the left lower lobe of the lung.  Otherwise stable right upper lobe 6 mm pulmonary nodule, and no evidence of disease recurrence in the abdomen or pelvis.  PET scan examination on 9/18/2020 reported multiple hypermetabolic small pulmonary nodules/ new pulmonary nodules.   Patient was started on pelvic chemotherapy with FOLFIRI regimen on 10/13/2020.   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 It reported tumor mutation burden 5 Muts/Mb, microsatellite stable, TP53 mutation R282W, and others.   Cycle #5 was without irinotecan, due to peripheral neuropathy.  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  Patient was switched to weight-based Lovenox injection.  Cycle #6 5-FU and irinotecan was delayed by 2 weeks because of the above incident.  Patient had a telemedicine evaluation at that the [facility] [place] in February 2021.  They agreed with our treatment plan for palliative chemotherapy followed by maintenance chemotherapy.    PET scan examination on 4/6/2021 after cycle #12 palliative chemotherapy reported no evidence of hypermetabolic metastatic lesion.   Patient was started on maintenance chemotherapy with 5-FU and  Avastin on 4/13/2021. (Unable to tolerate Xeloda because of a significant hand-foot syndrome).   PET scan on 9/24/2021 obtained after cycle #12 maintenance chemotherapy with 5-FU, leucovorin, Avastin reported no evidence for active disease. We recommended 3 months chemotherapy holiday.  CT scan examination on 3/15/2022 reported slightly disease progression with increase in size of small pulmonary nodule.  We started patient back on maintenance chemotherapy on 3/29/2022 treatment with 5-FU plus leucovorin and Avastin, repeating every 2 weeks.  After 6 more maintenance chemotherapy, CT scan for chest abdomen pelvis was done on 6/21/2022 which reported stable sub-6 mm pulmonary nodules.  Patient had last maintenance chemotherapy on 6/7/2022.   Disease progression, patient was restarted back on chemotherapy with 5-FU leucovorin and bevacizumab 8/21/2024      HISTORY OF PRESENT ILLNESS:  The patient is a 44 y.o. female with the above-mentioned history, who is seen back today for evaluation.    History of Present Illness  The patient is here today, 09/18/2024, for an evaluation before her third cycle of palliative chemotherapy with 5-FU, leucovorin, and bevacizumab.    She underwent a colonoscopy by Dr. Ye on 09/11/2024, during which a random biopsy of the sigmoid colon was taken. The pathology report showed no hyperplastic or tubulovillous changes, significant inflammation, corpus distortion, basement membrane thickening, viral inclusion, or other organisms. The procedure report noted patchy mild inflammation in the descending colon, characterized by congestion/edema. Biopsies were taken with cold forceps. Evidence of previous endo-anal anastomosis was found in the rectum, along with hemorrhoids.    She reports that her second cycle of chemotherapy on 09/04/2024 was challenging due to severe diarrhea. Despite receiving fluids and an injection, her condition only improved temporarily. A flexible sigmoidoscopy performed  by Dr. Esparza revealed large internal hemorrhoids, for which she was prescribed hydrocortisone cream. However, her diarrhea returned after the procedure, making it difficult for her to eat. She received another injection on Monday, which caused nausea but slowed down her diarrhea.    She has been taking Imodium and Lomotil (2 tablets, 4 times a day) for two years and recently started taking Pepto-Bismol three times a day as per Dr. Esparza's advice. This has resulted in solid black stools. She believes her symptoms may be due to colitis, as indicated by her PET scan, and possibly related to antibiotic use. She is considering adding octreotide to her regimen for home use in case of diarrhea onset. She is currently not on any antibiotics and is trying to establish a pattern to manage her symptoms.    Results  Laboratory Studies  HB 14.9, platelets 164,000, WBC 5850.      Colonoscopy examination showed patchy mild inflammation characterized by congestion/edema in the descending colon. Evidence of previous endo coloanal anastomosis in the rectum. Presence of hemorrhoids.        Past Medical History:   Diagnosis Date    Abdominopelvic abscess 08/12/2020    ADMITTED TO [facility]    Abnormal Pap smear of cervix 02/02/1998    JULIUS I    Abscess of abdominal wall 11/28/2012    SEEN AT [facility] ER    Anemia in pregnancy     Anxiety     Bilateral epiphora 04/2020    Bilateral hand swelling 05/02/2020    SEEN AT [facility] ER    Cervical lymphadenitis 06/06/2012    SEEN AT [facility] ER    Chemotherapy induced diarrhea 01/2021    Chemotherapy induced neutropenia 04/2020    Chemotherapy-induced nausea 02/2020    Chemotherapy-induced thrombocytopenia 05/2020    Chronic anticoagulation     Chronic diarrhea     Colon polyps     FOLLOWED BY DR. 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 ALEXANDRU HUNT    History of gestational diabetes     History of pre-eclampsia     History of pre-eclampsia     History of radiation therapy     FOLLOWED BY DR. JAVON LEWIS    History of TB skin testing 2009    TB Skin Test    History of TB skin testing 2009    TB Skin Test    History of transfusion 2019    2019    HPV in female     Hypokalemia 2019    Hypomagnesemia 2019    Hyponatremia 2021    IBS (irritable bowel syndrome)     Ileostomy present 2020    Take down on 11/3/2022    Infected insect bite of neck 2016    SEEN AT [facility]    Kidney stones 2007    SEEN AT [facility]    Low anterior resection syndrome 2022    FOLLOWED BY DR. GERONIMO ESPARZA    Lump of right breast     SWOLLEN LYMPH NODE    Lung cancer 2020    METASTATIC LUNG CANCER    Macrocytosis 2021    Monopolar depression     On anticoagulant therapy     Pain associated with surgical procedure 2020    Palmar-plantar erythrodysesthesia 2021    Perirectal abscess 2020    Pulmonary embolism without acute cor pulmonale 09/20/2019    X 3; ADMITTED TO [facility]    Pulmonary nodule, right 2020    Rectal cancer 2019    STAGE IIA, INVASIVE MODERATELY DIFFERENTIATED ADENOCARCINOMA, CHEMO AND XRT FINISHED 2019    Right shoulder pain 2020    SEEN AT [facility]    Right ureteral stone 10/01/2019    SEEN AT [facility]    SAB (spontaneous ) 1996     A2-1 INDUCED    Sciatica     Sepsis due to cellulitis 2002    LEFT GREAT TOE, ADMITTED TO [facility]    Tachycardia 2020     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  She recovered without problem.      Past Surgical History:   Procedure Laterality Date    ABDOMINAL SURGERY      CHOLECYSTECTOMY N/A 10/10/2003    LAPAROSCOPIC WITH CHOLANGIOGRAM, DR. JAMEY TALAVERA AT [facility]    COLON RESECTION N/A 12/02/2019    Procedure: laparoscopic low anterior colon resection with mobilization of splenic flexure and diverting loop ileostomy: ERAS;  Surgeon: Padmaja Esparza MD;  Location: [facility] OR;  Service: General    COLON RESECTION N/A 08/03/2020    Procedure: LOW ANTERIOR COLON RESECTION, COLOANAL ANASTOMOSIS, MOBILIZATION SPLENIC FLEXURE;  Surgeon: Padmaja Esparza MD;  Location: [facility];  Service: General;  Laterality: N/A;    COLONOSCOPY N/A 07/15/2020    PATENT ANASTAMOSIS IN MID RECTUM, RESCOPE IN 1 YR, DR. PADMAJA ESPARZA AT [facility]    COLONOSCOPY N/A 11/03/2022    DIFFUSE AREA OF MODERATELY FRIABLE MUCOSA IN ENTIRE COLON , DIVERSION COLITIS, PATENT AND HEALTHY ANASTAMOSIS, DR. PADMAJA ESPARZA AT [facility]    COLONOSCOPY N/A 12/04/2023    6 MM SESSILE SERRATED ADENOMA POLYP IN DESCENDING, PATENT ANASTAMOSIS IN DISTAL RECTUM, RESCOPE IN 2 YRS, DR. PADMAJA ESPARZA AT [facility]    COLONOSCOPY W/ POLYPECTOMY N/A 07/08/2019    15 MM TUBULOVILLOUS ADENOMA POLYP IN HEPATIC FLEXURE, 20 MMTUBULOVILLOUS ADENOMA WITH HIGH GRADE DYSPLASIA IN RECTOSIGMOID, 4 CM MASS IN MID RECTUM, PATH: INVASIVE MODERATELY DIFFERENTIATED ADENOCARCINOMA, DR. JENNIFER LI AT [facility]    DILATATION AND EVACUATION N/A 2009    ENDOSCOPY N/A 07/08/2019    LA GRADE A ESOPHAGITIS, GASTRITIS, ALL BIOPSIES BENIGN, DR. JENNIFER LI AT [facility]    ILEOSTOMY CLOSURE N/A 11/14/2022    Procedure: ILEOSTOMY TAKEDOWN;  Surgeon: Padmaja Esparza MD;  Location: [facility];  Service: General;  Laterality:  N/A;    INCISION AND DRAINAGE PERIRECTAL ABSCESS N/A 08/14/2020    Procedure: INCISION AND DRAINAGE OF retrorectal dehiscence abcess with drain placement and irrigation;  Surgeon: Geronimo Esparza MD;  Location: [facility] MAIN OR;  Service: General;  Laterality: N/A;    PAP SMEAR N/A 01/24/2014    SIGMOIDOSCOPY N/A 07/24/2019    INFILTRATIVE PARTIALLY OBSTRUCTING LARGE RECTAL CANCER, AREA TATOOED, DR. GERONIMO ESPARZA AT [facility]    SIGMOIDOSCOPY N/A 11/23/2019    AN ULCERATED PARTIALLY OBSTRUCTING MASS IN MID RECTUM, AREA TATTOOED, DR. GERONIMO ESPARZA AT [facility]    SIGMOIDOSCOPY N/A 07/22/2021    PATENT ANASTAMOSIS IN DISTAL RECTUM, RESCOPE IN 1 YR, DR. GERONIMO ESPARZA AT [facility]    SIGMOIDOSCOPY N/A 9/11/2024    Procedure: FLEXIBLE SIGMOIDOSCOPY with biopsies;  Surgeon: Geronimo Esparza MD;  [facility];  Service: General;  Laterality: N/A;  pre- hx rectal ca  post- minor inflammation    TRANSRECTAL ULTRASOUND N/A 07/24/2019    Procedure: ULTRASOUND TRANSRECTAL;  Surgeon: Geronimo Esparza MD;  [facility];  Service: General    URETEROSCOPY LASER LITHOTRIPSY WITH STENT INSERTION Right 10/30/2019    DR. ESTUARDO BEASLEY AT [place]    VAGINAL DELIVERY N/A 09/18/1998    VENOUS ACCESS DEVICE (PORT) INSERTION Left 08/09/2019    Procedure: INSERTION VENOUS ACCESS DEVICE;  Surgeon: Sj Joseph MD;  Location:  [facility];  Service: General    VENOUS ACCESS DEVICE (PORT) INSERTION N/A 12/18/2020    Procedure: INSERTION VENOUS ACCESS DEVICE right side, removal venous access device left side;  Surgeon: Sj Joseph MD;  Location:  [facility];  Service: General;  Laterality: N/A;    WISDOM TOOTH EXTRACTION Bilateral 1993       HEMATOLOGIC/ONCOLOGIC HISTORY:      The patient reports she has intermittent rectal bleeding and urgency, mucous with her stool, starting sometime in 2016. At that time she was referred to GI service, and was diagnosed of irritable bowel syndrome. Nevertheless she had worsening urgency  for bowel movement, and had a couple of incidents losing control of stool. She was recently seen by Roland Thorpe MD again and had colonoscopy and EGD exam on 07/08/2019. She was found having a circumferential rectal mass. According to the procedure note, the patient had a fungating circumferential bleeding 4 cm mass in the middle rectum, at distance between 13 cm and 17 cm from the anus. Mass was causing partial obstruction. There were also colon polyps found at the hepatic flexure and also at the rectosigmoid junction 23 cm from the anus. Both were resected and retrieved. EGD examination reported grade A esophagitis at the GE junction and patchy discontinuous erythema and aggravation of the mucosa without bleeding in the stomach body. There is normal mucosa of the duodenum. Pathology evaluation from this procedure reported moderately differentiated adenocarcinoma involving the rectal mass. The rectal sigmoid junction polyp was tubular/tubulovillous adenoma with high grade dysplasia negative for invasive malignancy. The hepatic flexure polyp was also tubular/tubulovillous adenoma negative for high grade dysplasia or malignancy. The biopsy from the esophagus reports squamocolumnar mucosa with inflammatory changes suggestive of mild reflux esophagitis, negative for interstitial metaplasia. Gastric biopsy was benign and duodenal biopsy was also benign. There is no mention of H-pylori.     Patient was subsequently referred to colorectal surgeon Padmaja Ye MD for further evaluation. The patient had CT scan examination for chest, abdomen and pelvis requested by Dr. Ye and were done on 07/13/2019. The study reported no evidence of lymphadenopathy in the abdomen and pelvis. There was wall thickening of the rectosigmoid junction. Normal spleen, pancreas, adrenal glands and kidneys. There was fatty liver infiltration but no focal lymphatic lesions. There was a small 6-7 mm noncalcified nodule in the right upper lobe and  a tiny 3 mm subpleural nodule in the right middle lobe. No mediastinal or hilar lymphadenopathy.     Dr. Ye performed staging rectal ultrasound examination. This study reported tumor penetrating through the muscularis propria as T3 disease; there were no lymph nodes identified.    She had a hospitalization in late September 2019 because of newly discovered pulmonary emboli.  She was on prophylactic Lovenox prior to the incident of PE.  Now she is on full dose Xarelto anticoagulation.  Patient reports no further chest pain dyspnea.  She denies bleeding or bruising.  During her hospitalization, she was seen by Dr. Ye, who plans to have surgery 8 to 12 weeks after finishing radiation therapy.  She finished her radiation on 9/19/2019.     I noticed patient also presented to the emergency room on 10/1/2019 complaining of right flank area pain.  I reviewed the images studies and indeed she has a very small 1 to 2 mm obstructing kidney stone in the UV junction.  Patient is still symptomatic with some pains and dysuria.  She denies fever sweating or chills.    Laboratory study on 10/7/2019 reported normal CBC including hemoglobin 13.1, platelets 301,000, WBC 6170 and ANC 4900 lymphocytes 590 monocytes 480.      The nurse reported malfunction of port-a-catheter on 10/7/2019.  Port study on 10/8/2019 showed fibrin sheath around the distal aspect of the Mediport catheter in the SVC. This does not appear to hinder injection, but does prevent aspiration at this time.    Patient underwent surgical resection of the colon on 12/2/2019 with Dr. Ye.  Pathology evaluation reported residual T3 disease, also 1 out of 14 lymph nodes positive for malignancy.  So this patient in either had at least stage IIIb disease (T3 N1 M0?).      Adjuvant chemotherapy FOLFOX 6 will be started on 1/22/2020.  Laboratory study reported iron saturation 10%, free iron 31 TIBC 319 and ferritin 168.  Her hemoglobin was 11.8, WBC 5600, and platelets  347,000.    Patient was here on 02/12/2020 for cycle 2 of her FOLFOX.  This is delayed x1 week secondary to neutropenia.  The patient ultimately received 3 days worth of Neupogen with recovery of her blood counts.  Of note, the patient struggled with significant nausea without any episodes of vomiting following cycle #1 of chemotherapy resulting in significant weight loss.  She is up 12 pounds over the last week as her appetite has normalized.  We will add Emend to her care plan.    She is having several loose stools per her colostomy and has been hesitant to take Imodium due to prior history of constipation.  I encouraged her to try this with a maximum of 8 tablets/day.  She denies any infectious symptoms including fevers or chills.  No excess bleeding or bruising noted.  She had the expected cold sensitivity related to the Oxaliplatin for about 3 days following treatment.    Labs from 02/12/2020 demonstrated total white blood cell count of 5.14, ANC of 3.06, hemoglobin of 11.2, platelets of 211,000.  She was found to be iron deficient last week and is intolerant to oral iron secondary to GI distress.  For this reason, she initiated IV iron therapy with Injectafer last week.  She had received her second dose 02/12/2020    Patient has normalized hemoglobin 12.2 on 2/26/2020.    She reported on 5/5/2020 she had a recent visit to the emergency department for what was suspected to be an allergic reaction.  She called our on-call service on Saturday with reports of hand and face swelling.  She was instructed to proceed to the emergency department at which time she was assessed and prescribed a Medrol Dosepak.  She had just completed her 5-FU and was unhooked on Friday, 5/3/2020.  Her symptoms have improved.  She does report persistent hyperpigmentation and mild swelling of the palms of the hands but this is much improved.  It was her right hand specifically that was swollen.  Her facial swelling has resolved.  She  continues on Cefdinir nd since has 1 day remaining, she was prescribed cefdinir for a UTI requiring hospitalization at the end of April.  Reports no new symptoms.  Her labs are stable.  She is scheduled for treatment again.    Patient states at this time she is not tolerating her chemotherapy well.     Patient seen in the emergency department on 5/2/2020 for what was suspected to be an allergic reaction.  She called our on-call service on Saturday explaining that she was experiencing hand and face swelling.  She was instructed to proceed to the emergency department at which time she was assessed and prescribed a Medrol Dosepak.  She had just completed her 5-FU and was unhooked on Friday, 5/1/2020.      She reports since her ED visit on 5/2/2020 her symptoms have not improved. Her hands and feet were swollen upon presenting to the ED and she could barely make a fist. She states that she feels so swollen she is not able to stand it. She states that her skin on the hands and feet are peeling extensively as well, besides changing color to more dark.     She also reports significant fatigue after her first week of the 5-FU treatment but she expected this side effect. She also notices significant increase in her neuropathy to the point that she is not able to even walk around in her home without her house slippers due to irritation from her rugs. She denies and associated nausea or vomiting at this time. She also has episodes of epistaxis every day after the chemotherapy cycle #7.  She does report working full-time during the week of chemotherapy.    Laboratory studies, 5/13/2020, show moderate thrombocytopenia platelets 123,000, low normal WBC 4140 including ANC 2720 and normal hemoglobin 13.4.  Because significant hand-foot syndrome, will decrease both 5-FU and oxaliplatin by 50%, and eliminate bolus dose of 5-FU.    On 5/21/2020 patient had a PET scan performed which showed no convincing evidence of residual disease in  abdomen or pelvis, no metastatic disease within the chest or neck.     Cycle #8 FOLFOX 6 was given on 5/13/2020, with 50% dose reduction for both 5-FU and oxaliplatin, and also examination of bolus 5-FU.  She states on 5/27/2020 that with the recent reduction of the chemotherapy she feels significantly better. She has more energy and is able to do her daily routine.      PET scan examination on 5/21/2020 reported no evidence of metastatic disease in chest abdomen pelvis.  There was a stable 6 mm right upper lobe pulmonary nodule.    Laboratory studies on 5/27/2020 showed mild leukocytopenia WBC 3720 but a normal ANC 2250 and lymphocytes 630.  Normal platelets 163,000 and hemoglobin 12.6.  Chemistry lab reported normal renal function, liver function panel and a electrolytes, elevated glucose 164.    Laboratory studies 6/24/2020, showed normal hemoglobin 13.4 but macrocytosis .9.  Normal platelets 210,000 and WBC 5870.  She had normal CMP.     Patient last time was here in late June 2020.  Since that time she had surgical procedure for low anterior colon resection, coloanal anastomosis on 8/3/2020.  She later developed a perirectal abscess, required surgical drainage on 8/14/2020.  She was discharged on 8/27/2020.    Patient reports to me she still has lower abdominal wall vacuum suction in place.  She denies fever sweating chills.  Performance status is ECOG 1.  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  She does have visiting nurse come to the home for wound care.    CT scan for chest abdomen pelvis on 9/9/2020 reported a new 8 mm noncalcified pulmonary nodule in the left lower lobe.  Otherwise stable right upper lobe 6 mm pulmonary nodule, and no evidence of disease recurrence in the abdomen or pelvis.     Laboratory study on 9/16/2020 reported elevated AST 55, ALT 95, alk phosphatase 143 but normal total bilirubin 0.3.  Chemistry lab otherwise unremarkable.  She has completed  normal CBC.      Because of the abnormal CT scan examination for chest abdomen pelvis on 9/9/2020 reported a new 8 mm noncalcified pulmonary nodule in the left lower lobe, we obtained a PET scan examination for further evaluation, which was done on 9/18/2020.  This study reported several pulmonary nodules, some of them are hypermetabolic, newly developed compared to previous PET scan in May 2020.  Certainly does highly suspicious for metastatic disease.  There are also hypermetabolic activity in the abdomen and pelvic area which is hard to differentiate from surgical scar versus metastatic malignancy.    Laboratory study on 10/13/2020 reported normal CBC with Hb 13.9, platelets 302,000 and WBC 5520 including ANC 3830.  Chemistry lab reported normalized AST 20, alk phosphatase 116 improved ALT 35, and maintains normal bilirubin, with normal electrolytes and liver function panel.     Patient was started on first cycle of palliative chemotherapy FOLFIRI on 10/13/2020.    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  Patient had a new port placed 12/18/2020, and had held her Xarelto for 2 days prior to surgery.  She presented to the ER on 12/21/20 with complaints of facial and arm swelling for 3 days.  She also noted increased shortness of breath.  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  Thrombus within the right internal jugular vein cannot be excluded.  Patient was started on IV heparin, and eventually transitioned to weight-based Lovenox, which she now continues.    PET scan examination on 9/24/2021 reported further shrinking of the tiny right upper lobe pulmonary nodule.  Otherwise no new lesions.  No evidence for metastatic disease in other areas.      Patient had CT scan for chest with IV contrast obtained on 12/14/2021  which reported small tiny stable pulmonary nodules. There is a 4 mm right upper lobe pulmonary nodule. There is also a stable 4 mm left lower lobe pulmonary nodule. There is also stable left upper lobe micronodule.     Laboratory studies today on 12/21/2021 reported normal hemoglobin 15.9 however .0, platelets 218,000 WBC 5360 including neutrophils 3730 lymphocytes 980. Chemistry lab reported normal renal function, electrolytes, glucose, and marginally elevated ALT 55, AST 34 but normal total bilirubin and alk phosphatase.     CT scan for chest abdomen pelvis 6/21/2022 reported sub-6 mm pulmonary nodules either stable or slightly smaller.  No new pulmonary nodules or evidence for disease progression.  There is no evidence for metastatic disease in the liver however it shows diffuse hepatic steatosis.  There was masslike thickening in the presacral space with the clips or calcification approximately 1.7 cm in greatest AP dimension but stable.    Laboratory study on 9/20/2022 reported normal hemoglobin 15.7 with mild macrocytosis .1.  She has normal CBC and platelets.  She also has unremarkable CMP.  Normal CEA 1.13 ng/mL.    Patient was seen by Dr. Ye, who performed ileostomy takedown on 11/14/2022.  Patient reports that she is recovering.  She still has a small open wound less than 1 cm in diameter, however close to 2 cm deep.  She has been changing dressing herself.  She denies fever sweating chills.    Patient has no other specific complaints.  She has excellent performance status ECOG 0.  She denies chest pain dyspnea cough hemoptysis.  No abdominal pain.  No melena hematochezia.  Patient has been eating well, stable weight.  She works full-time.    She continues Lovenox injections and denies any significant bleeding issues.   No other new problems or concerns.     CT scan for chest abdomen pelvis obtained on 1/10/2023 reported stable small pulmonary nodules, no evidence for active or new  disease.    Laboratory study on 1/10/2023 reported normal CBC and normal CMP.  CEA level is 0.99 ng/mL.    CT scan for chest, abdomen, and pelvis on 7/7/2023 reported stable small pulmonary nodules including a left upper lobe 5 mm nodule. There were no new masses, or pleural effusion. No enlarged supraclavicular, axillary, mediastinal, or hilar lymphadenopathy. Mediastinal vasculature normal. There is occlusion of the superior vena around the catheter with body wall  is present. There was no evidence for disease recurrence in the abdomen or pelvis. Bone is also negative for metastatic disease.    Laboratory studies obtained on 07/07/2023 also reported normal CBC, and normal CMP as well as low level CEA 1.07 ng/mL.    The CT scan for the chest on 10/27/2023 reported enlarging pulmonary nodules bilaterally. The right upper lobe lung nodule increased from 7 x 7 mm to 9 x 8 mm, and the left upper lobe nodule measured 8 x 9 mm from 5 x 6 mm in 04/2023. There is a different left upper lobe nodule 6 x 8 mm from previous 5 x 5 mm. The presacral tissue thickness measures up to 2.3 cm.    A laboratory study on 10/27/2023 reported CEA 1.58 ng/mL, normal CBC, and CMP.  Molecular study of peripheral blood Guardant Reveal is still pending.    The patient presents today on 11/17/2023 for reevaluation to discuss the results of PET scan examination as well as the results of tumor conference. I saw her recently on 11/03/2023 and because of enlarging pulmonary nodules on the CT scan, we requested a PET scan examination. I presented her case yesterday on 11/16/2023 at the [facility].    The patient reports she is at her baseline condition as 2 weeks ago. No specific complaints, no chest pain, dyspnea, cough, etc. She has a regular bowel movement.    Her case was present at the [facility]. on 11/16/2023. The PET scan images and chest CT scan were reviewed in conjunction with her treatment  history. The consensus was for patient to receive stereotactic radiation therapy for the right upper lobe lung lesion, and the bigger left upper lobe lesion, and monitor the smaller left upper lobe lesion with further images studies down the line. Also, the conference recommended Guardant Reveal study which we already ordered.     This Guardant Reveal study came back today as negative for ctDNA 0%.        CT of the chest on 2/2/2024 reported shrinking of the right upper lobe lesion from 9 mm to 6 mm, and the left upper lobe lesion also decreased from 9 mm to 6 mm. Otherwise, there are a couple of stable pulmonary nodules, 7 to 8 mm. The right hilar has a small lymph node that has increased from 6 mm to 8 mm and is otherwise stable. There was no suspicious lesion in the abdomen or pelvis.      MEDICATIONS    Current Outpatient Medications:     bismuth subsalicylate (PEPTO BISMOL) 262 MG/15ML suspension, Take 15 mL by mouth 4 (Four) Times a Day., Disp: , Rfl:     clonazePAM (KlonoPIN) 1 MG tablet, Take 1 tablet as needed daily for anxiety. May take one additional as needed for severe anxiety. (Patient taking differently: Take 1 tablet by mouth Every Evening. Take 1 tablet as needed daily for anxiety. May take one additional as needed for severe anxiety.), Disp: 45 tablet, Rfl: 4    Cyanocobalamin (VITAMIN B-12 PO), Take 1 tablet by mouth 3 (Three) Times a Week. M-W-F, Disp: , Rfl:     dicyclomine (BENTYL) 20 MG tablet, TAKE 1 TABLET BY MOUTH EVERY 6 (SIX) HOURS AS NEEDED FOR IRRITABLE BOWEL SYMPTOMS (Patient taking differently: Take 1 tablet by mouth Every Evening.), Disp: 360 tablet, Rfl: 2    diphenoxylate-atropine (LOMOTIL) 2.5-0.025 MG per tablet, TAKE 2 TABLETS BY MOUTH 4 TIMES A DAY (Patient taking differently: 2 tablets 4 (Four) Times a Day.), Disp: 240 tablet, Rfl: 0    Enoxaparin Sodium (LOVENOX) 100 MG/ML solution prefilled syringe syringe, Inject 1 mL under the skin into the appropriate area as directed  Every 12 (Twelve) Hours., Disp: 60 mL, Rfl: 2    escitalopram (LEXAPRO) 10 MG tablet, Take 1 tablet by mouth Daily. (Patient taking differently: Take 1 tablet by mouth Every Evening.), Disp: 90 tablet, Rfl: 1    famotidine (PEPCID) 20 MG tablet, TAKE 1 TABLET BY MOUTH TWICE A DAY (Patient taking differently: Take 1 tablet by mouth Every Evening.), Disp: 180 tablet, Rfl: 2    hydrocortisone 2.5 % cream, APPLY RECTALLY 3 TIMES DAILY. INCLUDE APPLICATOR., Disp: , Rfl:     Hydrocortisone, Perianal, (Anusol-HC) 2.5 % rectal cream, Apply rectally 3 times daily.  Include applicator., Disp: 30 g, Rfl: 1    Imvexxy Maintenance Pack 10 MCG insert, Insert 10 mcg into the vagina 2 (Two) Times a Week., Disp: , Rfl:     Loperamide HCl (IMODIUM PO), Take  by mouth As Needed., Disp: , Rfl:     Loratadine 10 MG capsule, Take 1 capsule by mouth Every Evening., Disp: , Rfl:     metoprolol succinate XL (TOPROL-XL) 25 MG 24 hr tablet, TAKE 0.5 TABLETS BY MOUTH EVERY NIGHT, Disp: 45 tablet, Rfl: 2    nystatin-hydrocortisone-bacitracin in zinc oxide ointment, Apply  topically to the appropriate area as directed Daily., Disp: 60 g, Rfl: 2    octreotide (sandoSTATIN) 100 MCG/ML injection, Inject 1.5 mL under the skin into the appropriate area as directed 3 (Three) Times a Day., Disp: 120 mL, Rfl: 0    ondansetron (ZOFRAN) 8 MG tablet, TAKE 1 TABLET BY MOUTH THREE TIMES A DAY AS NEEDED FOR NAUSEA AND VOMITING, Disp: 60 tablet, Rfl: 1    pantoprazole (Protonix) 40 MG EC tablet, Take 1 tablet by mouth Daily. (Patient taking differently: Take 1 tablet by mouth Every Evening.), Disp: 30 tablet, Rfl: 1    rosuvastatin (CRESTOR) 5 MG tablet, Take 1 tablet by mouth Daily. (Patient taking differently: Take 1 tablet by mouth Every Night.), Disp: 90 tablet, Rfl: 0    ALLERGIES:   No Known Allergies    SOCIAL HISTORY:       Social History     Tobacco Use    Smoking status: Every Day     Current packs/day: 1.00     Average packs/day: 1 pack/day for 25.0  "years (25.0 ttl pk-yrs)     Types: Cigarettes     Passive exposure: Current    Smokeless tobacco: Never    Tobacco comments:     1 PPD/caffeine use    Vaping Use    Vaping status: Some Days    Substances: Nicotine    Devices: Disposable    Passive vaping exposure: Yes   Substance Use Topics    Alcohol use: Not Currently     Comment: RARELY    Drug use: Never         FAMILY HISTORY:   Mother has positive factor V Leiden mutation, although she did not have thrombosis, mother also is coronary disease with stenting, she also is occasional bruising.    Maternal grandmother had DVT, she had multiple surgical procedures.    Patient mother's half-brother had metastatic colon cancer at diagnosis in his 50s.    Maternal great aunt had breast cancer.           Vitals:    09/18/24 0830   BP: 117/84   Pulse: 84   Temp: 98 °F (36.7 °C)   SpO2: 96%   Weight: 87.1 kg (192 lb)   Height: 167.6 cm (65.98\")   PainSc: 0-No pain           9/18/2024     8:30 AM   Current Status   ECOG score 0     Physical Exam      Physical Exam  Vitals reviewed.   Constitutional:       Appearance: Normal appearance. She is well-developed.   HENT:      Head: Normocephalic and atraumatic.      Comments:         Nose: Nose normal.   Eyes:      Conjunctiva/sclera: Conjunctivae normal.   Cardiovascular:      Rate and Rhythm: Normal rate and regular rhythm.   Pulmonary:      Effort: Pulmonary effort is normal.      Breath sounds: Normal breath sounds.   Abdominal:      General: Bowel sounds are normal. There is no distension.      Palpations: Abdomen is soft. There is no mass.      Tenderness: There is no abdominal tenderness.   Musculoskeletal:      Right lower leg: No edema.      Left lower leg: No edema.   Skin:     Findings: No rash.   Neurological:      Mental Status: She is alert. Mental status is at baseline.   Psychiatric:         Mood and Affect: Mood normal.           RECENT LABS:  Results from last 7 days   Lab Units 09/18/24  0810   WBC 10*3/mm3 " 5.85   NEUTROS ABS 10*3/mm3 3.99   HEMOGLOBIN g/dL 14.9   HEMATOCRIT % 45.0   PLATELETS 10*3/mm3 164     Lab Results   Component Value Date    GLUCOSE 118 (H) 09/18/2024    BUN 6 09/18/2024    CREATININE 0.78 09/18/2024     09/18/2024    K 3.8 09/18/2024     09/18/2024    CALCIUM 9.1 09/18/2024    PROTEINTOT 6.8 09/18/2024    ALBUMIN 3.9 09/18/2024    ALT 53 (H) 09/18/2024    AST 46 (H) 09/18/2024    ALKPHOS 107 09/18/2024    BILITOT 0.2 09/18/2024    GLOB 2.9 09/18/2024    AGRATIO 1.3 09/18/2024    BCR 7.7 09/18/2024    ANIONGAP 13.1 09/18/2024    EGFR 95.6 09/18/2024                   IMAGING:  NM PET/CT Skull Base to Mid Thigh (08/14/2024 09:17)     Assessment & Plan      ASSESSMENT:   1.  Metastatic rectal cancer.   Initial rectal ultrasound examination reported T3N0 disease without lymphadenopathy.   CT scan of chest, abdomen and pelvis reported no lymphadenopathy in the abdomen, pelvis or chest. She does have small pulmonary nodule 6-7 mm and 2 mm with indeterminate feature. There is no solid evidence of metastatic disease otherwise.   Based on the CT scan and rectal ultrasound imaging studies, this patient had stage IIA (T3N0M0) disease.   She had PET scan examination on 8/8/2019 which reported a hypermetabolic right upper lobe pulmonary nodule 6 mm with SUV 5.6.  This is suspicious for metastatic disease however it is too small to be biopsied.  This patient may have stage IV disease.   She initiated concurrent radiation with continuous 5-FU on 8/12/2019.  Patient finished concurrent chemoradiation therapy.  Patient underwent surgical resection of the rectal tumor and diverting loop ileostomy on 12/2/2019 with Dr. Ye.  Pathology evaluation reported residual T3 disease, with 1 out of 14 lymph nodes positive for malignancy.  Certainly this patient has at least stage IIIb rectal cancer (T3N1M0?)  On 1/22/2020 adjuvant chemotherapy FOLFOX 6 regimen initiated.   On 2/5/2022 cycle #2 was delayed  secondary to neutropenia.  She was given 3 days of Granix.   Emend added with cycle 3.  With improved nausea control  Continuing home Granix x3 days following 5-FU disconnect  3/11/2020 due for cycle 4, however, she is experiencing progressive abdominal pain and occasional fevers.   CT scan performed on 3/13/2020 reveals no evidence of progressive or recurrent disease.  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  Patient hospitalized 4/24-4/26/20 after cycle 5 of chemotherapy with acute UTI.  CT abdomen/pelvis noting fluid collection in the presacral region having diminished in size compared to CT dated 3/13/2020.  Patient was evaluated by Dr. Ye while in the hospital with further plans to evaluate possible colovaginal fistula following completion of chemotherapy.  Patient did respond to IV antibiotics and discharged home on oral cefdinir.  4/29/2020 cycle 6 of FOLFOX.  Urinary symptoms have resolved   Patient seen in the [facility] ED on 5/2/2020 for what was suspected to be an allergic reaction.  She called our service on Saturday explaining that she was experiencing hand and face swelling.  She was instructed to proceed to the emergency department at which time she was assessed and prescribed a Medrol Dosepak.  She had just completed her 5-FU and was unhooked on Friday, 5/1/2020.  Her symptoms have resolved in the office on assessment, 5/5/2020.  The patient had grade 3 hand-foot syndrome based on symptomology.  Patient had cycle #8 of 5-FU on 5/13/2020. Due to her symptoms and poor tolerance to the 5-FU, her treatment dose will be reduced 50% for oxaliplatin and infusional 5-FU.  Bolus 5-FU will be discontinued..  On 5/21/2020 patient had a PET scan and it showed no evidence of of metastatic disease in the neck, chest, abdomen or pelvis.  There was fluid accumulation/abscess.   On 5/27/2020 discussed with the patient that we can discontinue chemotherapy at this time.  She will follow-up with Dr. Ye to  discuss a possibility to reverse the ileostomy.  We likely will obtain anther PET scan in 3 to 4 months for reassessment.    Patient was seen by Dr. Ye on 6/19/2019 for evaluation and to discuss possible take down of her ileostomy.  She is scheduled to have a gastrografin enema on 7/2/2020 to evaluate for a fistula, and then a colonoscopy on 7/15/2020, both done by Dr. Ye.  She states that based on the above imaging and procedure findings, she may have a more extensive surgery done or just have her ileostomy reversed, which would likely be done in August 2020.  This will all be coordinated under the care of Dr. Ye.     CT scan from 09/09/2020 and also compared to her previous PET scan examination from 05/21/2020 and also the original PET scan from 08/09/2019.  The original PET scan there is a small right upper lobe pulmonary nodule 6 mm with SUV 5.6. So in the 05/2020 PET scan the nodule is still there but activity seems much less and this most recent CT scan examination also documented the preexisting small pulmonary nodule however there is a new left lower lobe pulmonary nodule 8 mm in size and I shared with the patient today this nodule is suspicious for metastatic disease. Laboratory studies reported minimal CEA level 1.32 on 09/09/2020. Liver function panel was only marginally abnormal with the ALT 45, the remaining is normal. However laboratory study today showed worsening AST 55, ALT 95 and alkaline phosphatase 143 but is still normal, total bilirubin 0.3.   Recommended to have repeat PET scan examination for assessment because the left lower lobe pulmonary nodule is too small to be biopsied, and if the PET scan reports hypermetabolic lesion, I recommended to have stereotactic radiation therapy. Explained to patient that she is not a really good candidate to have thoracotomy for resection because she still has unhealed wound in the abdomen. Stereotactic radiation therapy will likely be a more  feasible choice.   PET scan examination obtained on 9/18/2020 showed metastatic disease.  It reported a few hypermetabolic pulmonary nodules, and some new small pulmonary nodules, all of them highly suspicious for metastatic disease.  She also has hypermetabolic activity in the abdomen and pelvic area which could be related to scar tissue from her recent surgery versus metastatic disease.  Nevertheless overall picture fits with metastatic cancer.  Discussed with the patient on 9/20/2020, we reviewed the PET scan images together, and I recommended to have systematic chemotherapy, I do not think stereotactic reading therapy to the hypermetabolic lesions in the lungs warranted at this time because even those will be treated with reading therapy, she will still need systematic chemotherapy.  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  I did discuss with the patient using anti-EGFR monoclonal antibody versus anti-VEGF monoclonal antibody.     Palliative chemotherapy cycle#1 FOLFIRI was started on 10/13/2020.  No bolus 5-FU given considering previous poor tolerance.    NGS study from [facility] reported positive for [place] mutation.  Microsatellite stable, mutation burden 5/Mb.   Discussed with the patient 10/27/2020, because of the K-s[place] mutation, she is not a candidate for anti-EGFR monoclonal antibody such as Erbitux or vectibix.  She could be a candidate for anti-VEGF monoclonal antibody, however because of active wound, she is not a candidate right now at this moment.  Patient seen in the ED for acute right neck pain on 11/16/2020.  CT angiogram as well as CT of the cervical spine performed with no acute findings but notably one of her pulmonary nodules, left upper lobe, somewhat decreased in size.  Patient given prednisone pack.  Further details below.  Cycle #6 chemotherapy will be resumed on 1/5/2021.  Started back on original irinotecan.  PET scan examination obtained  on 1/12/2021 after cycle #6 chemotherapy reported improved pulmonary nodule hypermetabolic activity.  Confirmed these are metastatic lesions.  Patient is evaluated 1/19/2020, will continue cycle #7 FOLFIRI chemotherapy.  However Avastin will be on hold see next section.   Patient was reevaluated on 2/2/2021, will continue chemotherapy cycle #8 FOLFIRI.  Avastin / biosimilar will be added.    She talked to [facility] specialist in mid February 2021, and had recommended palliative chemotherapy without surgical intervention of metastatic lung lesions.   On 3/2/2021, patient will proceed with cycle #10 FOLFIRI plus bevacizumab.  After 12 cycles, plan to start maintenance treatment.  3/16/2021 cycle 11.  Plus bevacizumab.  3/30/2021 cycle 12 FOLFIRI plus bevacizumab.  Having issues with worsening nausea despite premeds with dexamethasone, Aloxi, Emend, and Zofran at home.  Patient is requesting a dose of Phenergan, which is helped her in the past.  We will give this to her with treatment today.  PET scan examination on 4/6/2021 reported no evidence of hypermetabolic metastatic lesion.  Discussed with the patient on 4/13/2021, we reviewed the PET scan results.  We recommended to switch to maintenance chemotherapy without irinotecan.  We will continue 5-FU and Avastin every 2 weeks.  We discussed possibility of switching to oral Xeloda treatment.  Discussed with the patient for side effects more so with hand-foot syndrome.  Patient is agreeable.   Xeloda 2000 mg twice daily 7 days on, 7 days off along with Avastin initiated 4/27/2021.  After only 5 doses of Xeloda significant hand-foot syndrome, Xeloda held. Patient requesting to be transitioned back to infusional 5-FU, as she felt that she tolerated infusional 5-FU without any problem.  The patient will receive 5-FU leucovorin and Avastin every 2 weeks.  Xeloda discontinued.  5/25/2021 continued cycle #4 maintenance 5-FU, leucovorin, Avastin  tolerating this much better so far, we will continue this. Recommended to have 12 cycles of maintenance chemotherapy, then obtain PET scan for reevaluation.  We could discuss further treatment plan at that time.  9/14/2021 patient due for cycle 12 of additional maintenance 5-FU/leucovorin plus Avastin.  She continues to tolerate treatment well overall.  She is anxious in the office today with her Mediport not getting blood at first and also with upcoming scans being heavy on her mind.  She was instructed to take one of her Klonopin pills while here to help calm her mind.  Heart rate did improve from 140s down to 112.  Proceed with treatment today as scheduled.  PET scan examination on 9/24/2021 reported further shrinking of the right upper lobe tiny pulmonary nodule.  No other new lesions.  Discussed with patient on 9/24/2021 about further shrinking of the right upper lobe pulmonary nodule and no evidence for other new lesions. We recommended to have chemo break for 3 months with repeated CT scan for reevaluation.  Recent CT scan for chest 12/14/2021 and abdomen CT from 11/29/2021 reported no evidence for active disease. The right upper lobe pulmonary nodule, left lower lobe pulmonary nodule minimal about 4 mm and stable. I discussed with patient today on 12/21/2021, recommended to give her another 3 months chemotherapy holiday and obtain CT scan afterwards for reevaluation. After discussion, patient is agreeable.   CT scan examination for chest abdomen and pelvis obtained on 3/15/2022 reported a slight increase of the left upper lobe pulmonary nodule 5 mm, from previous 3 mm.  The other pulmonary nodules were stable in size.  There is also small left upper lobe groundglass changes.   Dr. Nguyen reviewed images studies with the patient 3/23/2022, compared to multiple previous images including CT chest CT and PET scan, suspected disease progression.  Discussed with the patient, and I recommended to resume maintenance  chemotherapy with 5-FU plus leucovorin plus Avastin repeating every 2 weeks, and obtaining CT scan for reassessment in 3 months.  Patient is agreeable.  Patient resumed maintenance chemotherapy on 3/29/2022 with 5-FU leucovorin and Avastin.   4/26/2022, proceed with cycle #27 maintenance 5-FU, leucovorin, and Avastin.  Patient continues to tolerate treatment well.    On 5/10/2022, we will proceed ahead with cycle #28 maintenance chemotherapy.  Plan to have CT scan after cycle #30.  5/24/2022 cycle 29 maintenance chemotherapy.  Continue to tolerate well.  6/7/2022 cycle #30 maintenance chemotherapy, continuing to tolerate well.   CT scan for chest abdomen pelvis with IV contrast obtained on 6/21/2022 reported sub-6 mm pulmonary nodules either stable or slightly smaller.  We reviewed the images with the patient together.  We discussed with the patient and recommended to hold chemotherapy for now with repeating CT scan in 3 months for reassessment.  CT scan of the chest abdomen pelvis 9/13/2022 reported stable condition, no evidence for recurrent or metastatic colon cancer.  On 1/10/2023 patient had a CT chest abdomen pelvis with reported no evidence for disease progression.  Laboratory study also showed normal CEA.   Patient had a CT scan for chest, abdomen, and pelvis obtained on 04/11/2023. This study reported very small pulmonary nodules, the right upper lobe nodule is stable to 6 mm, however, the left upper lobe and the left lower lobe nodule increased to 5 mm from previous 4 mm. I discussed with the patient today on 04/19/2023, I recommend to obtain another CT scan in 3 months for reassessment. The nodules are so small, they likely will not be picked up by PET scan examination.  CT scan examination of the chest, abdomen, and pelvis obtained on 7/7/2023 reported stable small pulmonary nodules. No evidence for disease progression or new lesions in chest, abdomen, and pelvis.  Discussed with patient today on  7/14/2023, however, patient is concerning for disease progression. I recommended to obtain Guardant Reveal for circulating tumor DNA study. If the study is positive, we will further obtain PET scan examination, otherwise, we will obtain CT scan for chest, abdomen, and pelvis in 3 months for reassessment.  The patient had CT scan for the chest, abdomen, and pelvis obtained on 10/27/2023, which reported worsening pulmonary nodules at bilateral upper lobes. Laboratory studies showed low normal CEA level and normal liver function panel. The Guardant Reveal study is still pending. I discussed this with the patient on 11/03/2023 and we shared the images, and I recommended having a PET scan examination for further assessment. I will present her case at the multimodality lung conference. If those lesions are deemed to be metastatic lesions, I recommend having stereotactic radiotherapy. I discussed it with the patient, and she voiced understanding and was agreeable with that strategy.  I presented her case at the multimodality chest conference 11/16/2023.  The consensus was for patient to receive stereotactic radiation therapy for the right upper lobe lung lesion, and the bigger left upper lobe lesion, and monitor the smaller left upper lobe lesion with further images studies down the line. Also, the conference recommended Guardant Reveal study which we already ordered. I discussed with the patient today on 11/17/2023, we explained to the patient that the opinion of the conference and she is agreeable with this and prefers this strategy because of limited toxicity compared to long term commitment to starting chemotherapy again. I recommend to obtain a CT scan for the chest, abdomen, and pelvis with both IV and oral contrast for reassessment in 3 months for reassessment of metastatic lung lesions and thickness in the pelvis where the PET scan reported a low activity SUV 2.4 in the surgical bed.   The patient had  steroid-induced radiation therapy for the right upper lobe and one of the left upper lobe lesions.  Her CT scan examination on 02/02/2024 reported shrinking nodules of the right upper lobe and one of the left upper lobe lesions, both from 9 mm down to 6 mm. There are 2 stable pulmonary nodules 7 mm. Nothing new.  02/09/2024, I discussed with the patient and recommended CT scan for the chest, abdomen, and pelvis in 3 months for reassessment. Guardant Reveal study was 0% ctDNA. We will also continue laboratory studies every 3 months for Guardant Reveal, together with routine labs, CBC, CMP, and CEA.  CT scan of the chest, abdomen, and pelvis conducted on 4/23/2024 revealed stable multiple pulmonary nodules, with no other new pulmonary nodules or evidence of disease recurrence or abnormal pelvis. The patient's liver function panel was normal, and low-level CEA level was also noted. The Guardant Reveal study was able to be obtained earlier due to regulatory rules, and we hugo obtain today the Guardant reveal study, be available within 10 to 14 days.   Given the patient's metastatic liver lesion, and lung lesions from colon cancer, careful monitoring is necessary. A chest CT scan with IV contrast will be arranged in 3 months for reevaluation. The study will be reviewed again in 3 months, which will include CBC, CMP, and CEA level.   Imaging study with chest CT scan on 08/02/2024 reported multiple bilateral pulmonary nodules that are relatively stable. However, the Guardant Reveal reported positive ctDNA indicating disease progression. A subsequent PET scan examination on 08/14/2024 reported newly developed hypermetabolic pulmonary nodules. The highest SUV is 3.7, corresponding to an 8 mm new right upper lobe nodule, and there are several other hypometabolic nodules in the lungs.   8/21/2024 resumption of chemotherapy with 5-FU bevacizumab  Triage visit 8/28/2024 with intractable diarrhea that was unclear if this is  secondary to chemotherapy or infectious etiology given her known chronic colitis, fever and abdominal pain.  Further management as outlined below  9/4/2024 per review today diarrhea has improved though she is having some difficulty with passage of gas and stool,?  Related to significant inflammation in the rectum versus tumor obstruction.  The passage of gas has improved in the last few days but she does still have to change positions on the toilet to get stool to pass without it being painful.  Discussed all of this with Dr. Nguyen and we will refer the patient urgently back to Dr. Ye for at the very least rectal examination in the office versus full repeated colonoscopy.  Because of the potential for examination or invasive procedures, we will hold bevacizumab today.  Because the patient had significant diarrhea last week and concerns that it may be related to chemotherapy we will decrease her 5-FU/leucovorin today by 30%.  If she does well we can go back to full dose with cycle 3.     9/18/2024 she presented for evaluation prior to cycle #3 of palliative chemotherapy with 5-FU, leucovorin, and bevacizumab. Given her recent biopsy on 09/11/2024, it is prudent to postpone the Avastin treatment today to ensure safety.  Chemotherapy will be proceeded.        2.   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  Low-dose Lovenox discontinued and initiated Xarelto 20 mg daily.  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  Patient  held Xarelto for 2 days prior to MediPort removal from the left chest wall and placement of new port in the right chest wall on 12/18/2020.  She resumed Xarelto that evening.  Progressive swelling in the neck, face, upper extremities prompting hospitalization with CT scan showing thrombosis in the right subclavian vein and SVC.  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  Although she had been off of Xarelto for a few days, clearly Xarelto was not prohibiting progression of her thrombosis after she resumed treatment and there was some evidence to suggest thrombosis had been present at least in the innominate vein on prior scan in September but now appears chronic.  Current acute thrombosis involving SVC and right subclavian.  Patient was admitted and placed on heparin drip  Patient was evaluated by vascular surgery and intervention was not recommended for thrombolysis/thrombectomy.  On 12/22/2020, the patient developed worsening shortness of breath, increasing upper extremity edema consistent with worsening SVC syndrome.  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  Symptoms improved and patient was discharged 12/24/2020 on Lovenox 100 mg every 12 hours.    Returns 12/29/2020 for evaluation continuing Lovenox, overall tolerating it well.  No bleeding issues.  Improved edema 1/5/2021.  Will continue Lovenox weight-based every 12 hours.  On 1/19/2021 and 2/2/2021, patient reports improved facial swelling.  She however does have being bruise on her abdomen wall where she does Lovenox injection.  However she does not have a spontaneous epistaxis, gum bleeding or other bleeding.   On 2/2/2021, we discussed that side effects of Avastin/biosimilar related to thrombosis.  Since this patient already has been on Lovenox, I think the  benefits from treatment for her cancer will outweigh that risk of thrombosis, will proceed ahead with Avastin.  I cautioned patient to watch out for signs of worsening thrombosis patient voiced understanding.   5/11/2021 Lovenox dosing will be adjusted due to patient's weight loss.  We discussed she needs 1 mg/kg.  Her syringes are 100 mg syringes she will do 0.9 mL subcu every 12 hours.  8/3/2021 Patient continues to have bruising on abdomen from lovenox injections.  Will need to monitor weight and adjust dosing in future if further weight loss.   Stable condition on 9/28/2021.  Continue Lovenox anticoagulation.    Patient reports no chest pain no dyspnea no leg edema. However she had bruising and also most recently abnormal small abscess for which she actually went to ER on 11/29/2021, had I&D. The wound is healing. No further drainage. Denies fever sweating or chills. After discussion, she will continue Lovenox injection for now.   On 3/23/2022, patient reports good tolerance of Lovenox.  Nevertheless she still has small quantity of pus in the left lower abdomen abscess, she squeezes it every day to prevent it became worse.  She reports no fever sweating chills.  Recommended to start Augmentin 875 mg twice a day for 7 days.  She will continue Lovenox indefinitely.  On 4/12/2022, patient reports resolution of the small abscess at left lower abdominal wall.   On 5/10/2022, patient continues on Lovenox indefinitely.  No easy bleeding or bruising.  6/23/2022 continuing on Lovenox 1 mg/kg at a dose of 100 mg (patient weight is 96.6 kg today) every 12 hours.  On 1/17/2023, patient reports good tolerance, Lovenox will be continued.    Patient had a venous Doppler study on 02/05/2023, which reported acute left upper extremity DVT in the subclavian vein, axillary and the brachial vein, and also superficial thrombophlebitis in the basilic upper arm.   On 04/19/2023, patient reports that she was not compliant with  anticoagulation at the time of recurrent DVT. She now is fully compliant and is using Lovenox.  She continues on anticoagulation.  She has only occasional blood with wiping which is related to her frequent diarrhea    3.  This patient also has strong family history for malignancy   The patient had appointment with genetic counseling on September 3, 2019.  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    4.  Mild anemia.   She also has history of iron deficiency.  Iron studies reveal iron saturation of 10%.  She was started on oral iron but was unable to tolerate due to GI side effects.   Status post Injectafer 2/5/2020 and 2/12/2020   Improved hemoglobin 13.5 on 1/5/2021.  3/16/2020 when hemoglobin now up to 16.2, hematocrit 47.6.  Patient admits that she has started smoking again, and I have encouraged her to quit.  She denies any snoring or sleep apnea diagnosis.  Patient is not taking oral iron.  We will closely monitor.  3/30/2020 hemoglobin 15.7, HCT 47.5.  Patient states that she has cut back significantly on her smoking, although not quit yet.  I have encouraged her to continue working on this.  We will continue to closely monitor.  Hemoglobin 14.6 on 6/8/2021 at the meantime he has worsening macrocytosis .6.    Laboratory studies 6/22/2021 reported excellent folate > 20 ng/mL, however low normal B12 level 357 pg/mL.    On 7/6/2021, normal hemoglobin 15.8, .9 and HCT 47.2%.  Patient was asked to start oral vitamin B12 at 1000 mcg daily.   9/14/2021 hemoglobin 17.0, hematocrit 52.1.  Monitor.  On 9/28/2021 hemoglobin 16.1 hematocrit 48.5%, continue to monitor.   Lab study on 12/14/2021 reported excellent ferritin 296 and iron saturation 25% with free iron 104 TIBC 424. Hemoglobin was 15.2 with .2.   Normal hemoglobin 14.6 on 3/15/2022.  Iron study reported normal ferritin 129.5 ng/mL however slightly low iron saturation 11%.  Continue to monitor for now.  9/4/2024 hemoglobin is normal at  14.0  9/18/2024 hemoglobin 14.9.    5.  Peripheral neuropathy secondary to chemotherapy.   This is mild involving bilateral hands and feet as reported on 9/16/2020.   Will avoid chemotherapy with oxaliplatin.  Stable mild neuropathy as of 11/10/2020  Patient reports worsening peripheral neuropathy and cycle #5 chemotherapy on 12/8/2020 with and without irinotecan.   On 1/5/2021, patient reports improvement of peripheral neuropathy, will add irinotecan back to chemotherapy.  Continue to monitor and adjust medication.  Stable.      6.  Depression.  Patient seen by JULIET Davenport on 11/9/2020.  She was started on mirtazapine.  Lexapro was discontinued.  This has definitely improved her mood with the patient feeling overall much better.  She is sleeping better.  Appetite is improved with her actually eating more than she wants to.    Condition is stable.  She plans to continue follow-up with supportive oncology.    7. Malfunction of the portal catheter  Patient reports there was no blood drawn from the portal catheter. CT scan of the chest on 7/7/2023 reported occlusion of the SVC around to the Port-A-Cath tubing. I recommend trying activase to see if it helps.  Otherwise, we may have to remove the catheter. Clinically, patient has no signs of SVC syndrome.  On 11/03/2023, the patient reports that her Port-A-Cath was able to be used for a CT scan for the injection of intravenous dye; however, there was no blood return, so we needed to draw from her arm for the blood test. We will continue to keep Port-A-Cath for now.  On 02/09/2024, the patient reports that the port was able to be flushed. However, no blood was returned. We will continue to flush every 6 weeks.  9/4/2024 she continues to have no blood return from her port but can taste saline as we flush at each time and is functioning well otherwise.  Monitor.  9/18/2024 no specific problem.    *Intractable diarrhea  Patient developed diarrhea on Saturday,  8/24/2024.  Is unclear if this is related to infection as she did have fevers at the time the diarrhea began or chemotherapy.  She does have chronic colitis noted on most recent PET scan, this therefore could be diverticulitis flare  GI panel and C. difficile negative  8/29/2024 she did receive a single dose of octreotide  Begin empiric Flagyl 500 mg 3 times daily x 10 days  8/30/2024 discussed negative stool studies.  We will add Levaquin to already prescribed Flagyl to complete management of diverticulitis.  It is unclear if the patient's symptoms are related to diverticular flare given her previous abdominal pain and fever versus chemotherapy.  However, her symptoms are currently improved  9/4/2024 diarrhea has resolved.  Stools are now more soft with some form but she is having difficulty with passage of stool, having to change positions on the toilet to avoid pain.  We are referring back to Dr. Ye as detailed above.  She will finish out the Flagyl and Levaquin as prescribed having roughly 3 days left of both.  We will also adjust doses of 5-FU/leucovorin today with concern for potential chemotherapy-induced diarrhea.  If she does well this cycle we can increase back to full dose with cycle 3 per Dr. Nguyen.  On 9/18/2024 patient reports that she experienced significant diarrhea during cycle #2 chemotherapy on 09/04/2024, leading to a 30% dose reduction. Despite taking Lomotil 2 tablets four times a day, Imodium, and Pepto-Bismol, her diarrhea persisted. She received octreotide shots twice, which improved her symptoms from watery to mushy stools but did not fully resolve the issue. She will continue with the current regimen and consider adding octreotide to her home regimen if diarrhea starts at home. The potential side effects of octreotide, including nausea, were discussed.       8. Internal hemorrhoids.  She has large internal hemorrhoids diagnosed during a flexible sigmoidoscopy on 09/11/2024.  Hydrocortisone cream was prescribed for treatment.     9. Medication management.  She has been taking Lomotil 2 tablets four times a day, Imodium, and Pepto-Bismol three times a day for diarrhea management. Ondansetron was taken for nausea after receiving octreotide shots.      PLAN:    Proceed with cycle #3 palliative chemotherapy 5-FU/leucovorin at 30% dose reduction today.  Hold bevacizumab today due to colonoscopy with random biopsy.  Patient will return in 2 days to discontinue 5-FU.  Patient will be given 1 L normal saline and octreotide injection.  Will continue octreotide 100 mcg every 8 hours as needed due to severe diarrhea from chemotherapy.  We already have prescribed it to her pharmacy and waiting for preauthorization.  Continue Protonix 40 mg daily  Continue Gas-X  Continue Magic barrier cream rectum as needed  Continue anticoagulation with Lovenox subcu injection every 12 hours.  Return in 2 weeks for follow-up with me and cycle #4 palliative 5-FU/leucovorin with bevacizumab back into care plan.    Patient will see NP in 4 weeks and 6 weeks for laboratory studies and ongoing chemotherapy.  I will see patient in 8 weeks with laboratory study and ongoing chemotherapy.  A liquid biopsy/Guardant reveal study will be conducted to monitor her response together with intermittent imaging studies.      Patient continues on high risk medication requiring close monitoring for toxicity    I spent 46 minutes caring for Carole on this date of service. This time includes time spent by me in the following activities: preparing for the visit, reviewing tests, performing a medically appropriate examination and/or evaluation, counseling and educating the patient/family/caregiver, referring and communicating with other health care professionals, documenting information in the medical record, independently interpreting results and communicating that information with the patient/family/caregiver, care coordination, ordering  medications, obtaining a separately obtained history, and reviewing a separately obtained history      Dong Nguyen MD PhD  09/18/2024        CC:  Deepika Vela III NPMD Alverto Delgado MD

## 2024-09-20 ENCOUNTER — INFUSION (OUTPATIENT)
Dept: ONCOLOGY | Facility: HOSPITAL | Age: 44
End: 2024-09-20
Payer: COMMERCIAL

## 2024-09-20 DIAGNOSIS — C78.00 MALIGNANT NEOPLASM METASTATIC TO LUNG, UNSPECIFIED LATERALITY: ICD-10-CM

## 2024-09-20 DIAGNOSIS — T45.1X5A ADVERSE EFFECT OF ANTINEOPLASTIC AND IMMUNOSUPPRESSIVE DRUGS, INITIAL ENCOUNTER: ICD-10-CM

## 2024-09-20 DIAGNOSIS — T45.1X5A CHEMOTHERAPY INDUCED DIARRHEA: Primary | ICD-10-CM

## 2024-09-20 DIAGNOSIS — C20 ADENOCARCINOMA OF RECTUM: ICD-10-CM

## 2024-09-20 DIAGNOSIS — Z45.2 ENCOUNTER FOR FITTING AND ADJUSTMENT OF VASCULAR CATHETER: ICD-10-CM

## 2024-09-20 DIAGNOSIS — Z79.899 ENCOUNTER FOR LONG-TERM (CURRENT) USE OF HIGH-RISK MEDICATION: ICD-10-CM

## 2024-09-20 DIAGNOSIS — K52.1 CHEMOTHERAPY INDUCED DIARRHEA: Primary | ICD-10-CM

## 2024-09-20 PROCEDURE — 96372 THER/PROPH/DIAG INJ SC/IM: CPT

## 2024-09-20 PROCEDURE — 25010000002 HEPARIN LOCK FLUSH PER 10 UNITS: Performed by: INTERNAL MEDICINE

## 2024-09-20 PROCEDURE — 25010000002 OCTREOTIDE PER 25 MCG: Performed by: INTERNAL MEDICINE

## 2024-09-20 RX ORDER — OCTREOTIDE ACETATE 200 UG/ML
150 INJECTION INTRAVENOUS ONCE
Status: DISCONTINUED | OUTPATIENT
Start: 2024-09-20 | End: 2024-09-20

## 2024-09-20 RX ORDER — SODIUM CHLORIDE 0.9 % (FLUSH) 0.9 %
10 SYRINGE (ML) INJECTION AS NEEDED
Status: DISCONTINUED | OUTPATIENT
Start: 2024-09-20 | End: 2024-09-20 | Stop reason: HOSPADM

## 2024-09-20 RX ORDER — OCTREOTIDE ACETATE 500 UG/ML
150 INJECTION, SOLUTION INTRAVENOUS; SUBCUTANEOUS ONCE
Status: COMPLETED | OUTPATIENT
Start: 2024-09-20 | End: 2024-09-20

## 2024-09-20 RX ORDER — HEPARIN SODIUM (PORCINE) LOCK FLUSH IV SOLN 100 UNIT/ML 100 UNIT/ML
500 SOLUTION INTRAVENOUS AS NEEDED
Status: DISCONTINUED | OUTPATIENT
Start: 2024-09-20 | End: 2024-09-20 | Stop reason: HOSPADM

## 2024-09-20 RX ORDER — SODIUM CHLORIDE 0.9 % (FLUSH) 0.9 %
10 SYRINGE (ML) INJECTION AS NEEDED
OUTPATIENT
Start: 2024-09-20

## 2024-09-20 RX ORDER — HEPARIN SODIUM (PORCINE) LOCK FLUSH IV SOLN 100 UNIT/ML 100 UNIT/ML
500 SOLUTION INTRAVENOUS AS NEEDED
OUTPATIENT
Start: 2024-09-20

## 2024-09-20 RX ADMIN — Medication 500 UNITS: at 09:04

## 2024-09-20 RX ADMIN — Medication 10 ML: at 09:04

## 2024-09-20 RX ADMIN — OCTREOTIDE ACETATE 150 MCG: 500 INJECTION, SOLUTION INTRAVENOUS; SUBCUTANEOUS at 09:17

## 2024-09-23 ENCOUNTER — TELEPHONE (OUTPATIENT)
Dept: ONCOLOGY | Facility: CLINIC | Age: 44
End: 2024-09-23
Payer: COMMERCIAL

## 2024-09-23 DIAGNOSIS — Z85.048 HISTORY OF RECTAL CANCER: ICD-10-CM

## 2024-09-23 DIAGNOSIS — F41.9 ANXIETY: ICD-10-CM

## 2024-09-23 RX ORDER — PANTOPRAZOLE SODIUM 40 MG/1
40 TABLET, DELAYED RELEASE ORAL DAILY
Qty: 90 TABLET | Refills: 1 | Status: SHIPPED | OUTPATIENT
Start: 2024-09-23

## 2024-09-23 RX ORDER — ENOXAPARIN SODIUM 100 MG/ML
1 INJECTION SUBCUTANEOUS EVERY 12 HOURS SCHEDULED
Qty: 60 ML | Refills: 2 | Status: SHIPPED | OUTPATIENT
Start: 2024-09-23

## 2024-09-23 RX ORDER — DIPHENOXYLATE HCL/ATROPINE 2.5-.025MG
TABLET ORAL
Qty: 240 TABLET | Refills: 11 | Status: SHIPPED | OUTPATIENT
Start: 2024-09-23

## 2024-09-23 RX ORDER — CLONAZEPAM 1 MG/1
TABLET ORAL
Qty: 45 TABLET | OUTPATIENT
Start: 2024-09-23

## 2024-09-23 RX ORDER — CLONAZEPAM 1 MG/1
TABLET ORAL
Qty: 45 TABLET | Refills: 3 | Status: SHIPPED | OUTPATIENT
Start: 2024-09-23

## 2024-09-24 ENCOUNTER — INFUSION (OUTPATIENT)
Dept: ONCOLOGY | Facility: HOSPITAL | Age: 44
End: 2024-09-24
Payer: COMMERCIAL

## 2024-09-24 ENCOUNTER — TELEPHONE (OUTPATIENT)
Dept: ONCOLOGY | Facility: CLINIC | Age: 44
End: 2024-09-24
Payer: COMMERCIAL

## 2024-09-24 DIAGNOSIS — T45.1X5A CHEMOTHERAPY INDUCED DIARRHEA: Primary | ICD-10-CM

## 2024-09-24 DIAGNOSIS — T45.1X5A ADVERSE EFFECT OF ANTINEOPLASTIC AND IMMUNOSUPPRESSIVE DRUGS, INITIAL ENCOUNTER: ICD-10-CM

## 2024-09-24 DIAGNOSIS — K52.1 CHEMOTHERAPY INDUCED DIARRHEA: Primary | ICD-10-CM

## 2024-09-24 PROCEDURE — 96372 THER/PROPH/DIAG INJ SC/IM: CPT

## 2024-09-24 PROCEDURE — 25010000002 OCTREOTIDE PER 25 MCG: Performed by: INTERNAL MEDICINE

## 2024-09-24 RX ORDER — OCTREOTIDE ACETATE 200 UG/ML
150 INJECTION INTRAVENOUS ONCE
Status: DISCONTINUED | OUTPATIENT
Start: 2024-09-24 | End: 2024-09-24

## 2024-09-24 RX ORDER — OCTREOTIDE ACETATE 100 UG/ML
100 INJECTION, SOLUTION INTRAVENOUS; SUBCUTANEOUS 3 TIMES DAILY
Qty: 84 ML | Refills: 1 | Status: SHIPPED | OUTPATIENT
Start: 2024-09-24 | End: 2024-09-25

## 2024-09-24 RX ORDER — OCTREOTIDE ACETATE 500 UG/ML
150 INJECTION, SOLUTION INTRAVENOUS; SUBCUTANEOUS ONCE
Status: COMPLETED | OUTPATIENT
Start: 2024-09-24 | End: 2024-09-24

## 2024-09-24 RX ADMIN — OCTREOTIDE ACETATE 150 MCG: 500 INJECTION, SOLUTION INTRAVENOUS; SUBCUTANEOUS at 11:17

## 2024-09-25 RX ORDER — OCTREOTIDE ACETATE 100 UG/ML
100 INJECTION, SOLUTION INTRAVENOUS; SUBCUTANEOUS 3 TIMES DAILY
Qty: 84 ML | Refills: 1 | Status: SHIPPED | OUTPATIENT
Start: 2024-09-25

## 2024-09-26 ENCOUNTER — SPECIALTY PHARMACY (OUTPATIENT)
Dept: PHARMACY | Facility: HOSPITAL | Age: 44
End: 2024-09-26
Payer: COMMERCIAL

## 2024-09-27 ENCOUNTER — TELEPHONE (OUTPATIENT)
Dept: ONCOLOGY | Facility: CLINIC | Age: 44
End: 2024-09-27
Payer: COMMERCIAL

## 2024-10-02 ENCOUNTER — OFFICE VISIT (OUTPATIENT)
Dept: ONCOLOGY | Facility: CLINIC | Age: 44
End: 2024-10-02
Payer: COMMERCIAL

## 2024-10-02 ENCOUNTER — INFUSION (OUTPATIENT)
Dept: ONCOLOGY | Facility: HOSPITAL | Age: 44
End: 2024-10-02
Payer: COMMERCIAL

## 2024-10-02 VITALS
WEIGHT: 191.9 LBS | HEIGHT: 66 IN | TEMPERATURE: 98.2 F | OXYGEN SATURATION: 96 % | BODY MASS INDEX: 30.84 KG/M2 | RESPIRATION RATE: 16 BRPM | HEART RATE: 84 BPM | DIASTOLIC BLOOD PRESSURE: 68 MMHG | SYSTOLIC BLOOD PRESSURE: 104 MMHG

## 2024-10-02 DIAGNOSIS — C78.00 MALIGNANT NEOPLASM METASTATIC TO LUNG, UNSPECIFIED LATERALITY: ICD-10-CM

## 2024-10-02 DIAGNOSIS — Z79.899 ENCOUNTER FOR LONG-TERM (CURRENT) USE OF HIGH-RISK MEDICATION: ICD-10-CM

## 2024-10-02 DIAGNOSIS — Z86.711 HISTORY OF PULMONARY EMBOLISM: ICD-10-CM

## 2024-10-02 DIAGNOSIS — D68.51 HETEROZYGOUS FACTOR V LEIDEN MUTATION: Chronic | ICD-10-CM

## 2024-10-02 DIAGNOSIS — T45.1X5A ADVERSE EFFECT OF ANTINEOPLASTIC AND IMMUNOSUPPRESSIVE DRUGS, INITIAL ENCOUNTER: ICD-10-CM

## 2024-10-02 DIAGNOSIS — C20 ADENOCARCINOMA OF RECTUM: ICD-10-CM

## 2024-10-02 DIAGNOSIS — C20 ADENOCARCINOMA OF RECTUM: Primary | ICD-10-CM

## 2024-10-02 DIAGNOSIS — Z79.899 ENCOUNTER FOR LONG-TERM (CURRENT) USE OF HIGH-RISK MEDICATION: Primary | ICD-10-CM

## 2024-10-02 DIAGNOSIS — Z79.01 CHRONIC ANTICOAGULATION: Chronic | ICD-10-CM

## 2024-10-02 DIAGNOSIS — K52.1 CHEMOTHERAPY INDUCED DIARRHEA: ICD-10-CM

## 2024-10-02 DIAGNOSIS — T45.1X5A CHEMOTHERAPY INDUCED DIARRHEA: ICD-10-CM

## 2024-10-02 LAB
ALBUMIN SERPL-MCNC: 3.9 G/DL (ref 3.5–5.2)
ALBUMIN/GLOB SERPL: 1.6 G/DL
ALP SERPL-CCNC: 83 U/L (ref 39–117)
ALT SERPL W P-5'-P-CCNC: 21 U/L (ref 1–33)
ANION GAP SERPL CALCULATED.3IONS-SCNC: 11.4 MMOL/L (ref 5–15)
AST SERPL-CCNC: 18 U/L (ref 1–32)
BASOPHILS # BLD AUTO: 0.02 10*3/MM3 (ref 0–0.2)
BASOPHILS NFR BLD AUTO: 0.2 % (ref 0–1.5)
BILIRUB SERPL-MCNC: 0.4 MG/DL (ref 0–1.2)
BILIRUB UR QL STRIP: NEGATIVE
BUN SERPL-MCNC: 5 MG/DL (ref 6–20)
BUN/CREAT SERPL: 7.1 (ref 7–25)
CALCIUM SPEC-SCNC: 9 MG/DL (ref 8.6–10.5)
CHLORIDE SERPL-SCNC: 104 MMOL/L (ref 98–107)
CLARITY UR: CLEAR
CO2 SERPL-SCNC: 22.6 MMOL/L (ref 22–29)
COLOR UR: YELLOW
CREAT SERPL-MCNC: 0.7 MG/DL (ref 0.57–1)
DEPRECATED RDW RBC AUTO: 54.1 FL (ref 37–54)
EGFRCR SERPLBLD CKD-EPI 2021: 108.8 ML/MIN/1.73
EOSINOPHIL # BLD AUTO: 0.13 10*3/MM3 (ref 0–0.4)
EOSINOPHIL NFR BLD AUTO: 1.5 % (ref 0.3–6.2)
ERYTHROCYTE [DISTWIDTH] IN BLOOD BY AUTOMATED COUNT: 15.4 % (ref 12.3–15.4)
GLOBULIN UR ELPH-MCNC: 2.5 GM/DL
GLUCOSE SERPL-MCNC: 129 MG/DL (ref 65–99)
GLUCOSE UR STRIP-MCNC: NEGATIVE MG/DL
HCT VFR BLD AUTO: 42.3 % (ref 34–46.6)
HGB BLD-MCNC: 13.7 G/DL (ref 12–15.9)
HGB UR QL STRIP.AUTO: ABNORMAL
IMM GRANULOCYTES # BLD AUTO: 0.03 10*3/MM3 (ref 0–0.05)
IMM GRANULOCYTES NFR BLD AUTO: 0.4 % (ref 0–0.5)
KETONES UR QL STRIP: NEGATIVE
LEUKOCYTE ESTERASE UR QL STRIP.AUTO: NEGATIVE
LYMPHOCYTES # BLD AUTO: 1.18 10*3/MM3 (ref 0.7–3.1)
LYMPHOCYTES NFR BLD AUTO: 13.8 % (ref 19.6–45.3)
MCH RBC QN AUTO: 31.3 PG (ref 26.6–33)
MCHC RBC AUTO-ENTMCNC: 32.4 G/DL (ref 31.5–35.7)
MCV RBC AUTO: 96.6 FL (ref 79–97)
MONOCYTES # BLD AUTO: 0.55 10*3/MM3 (ref 0.1–0.9)
MONOCYTES NFR BLD AUTO: 6.5 % (ref 5–12)
NEUTROPHILS NFR BLD AUTO: 6.61 10*3/MM3 (ref 1.7–7)
NEUTROPHILS NFR BLD AUTO: 77.6 % (ref 42.7–76)
NITRITE UR QL STRIP: NEGATIVE
NRBC BLD AUTO-RTO: 0 /100 WBC (ref 0–0.2)
PH UR STRIP.AUTO: 6 [PH] (ref 4.5–8)
PLATELET # BLD AUTO: 166 10*3/MM3 (ref 140–450)
PMV BLD AUTO: 9.2 FL (ref 6–12)
POTASSIUM SERPL-SCNC: 4 MMOL/L (ref 3.5–5.2)
PROT SERPL-MCNC: 6.4 G/DL (ref 6–8.5)
PROT UR QL STRIP: NEGATIVE
RBC # BLD AUTO: 4.38 10*6/MM3 (ref 3.77–5.28)
SODIUM SERPL-SCNC: 138 MMOL/L (ref 136–145)
SP GR UR STRIP: 1.02 (ref 1–1.03)
UROBILINOGEN UR QL STRIP: ABNORMAL
WBC NRBC COR # BLD AUTO: 8.52 10*3/MM3 (ref 3.4–10.8)

## 2024-10-02 PROCEDURE — 25010000002 DEXAMETHASONE SODIUM PHOSPHATE 100 MG/10ML SOLUTION: Performed by: INTERNAL MEDICINE

## 2024-10-02 PROCEDURE — 25010000002 LEUCOVORIN CALCIUM PER 50 MG: Performed by: INTERNAL MEDICINE

## 2024-10-02 PROCEDURE — 99215 OFFICE O/P EST HI 40 MIN: CPT | Performed by: INTERNAL MEDICINE

## 2024-10-02 PROCEDURE — 80053 COMPREHEN METABOLIC PANEL: CPT

## 2024-10-02 PROCEDURE — 85025 COMPLETE CBC W/AUTO DIFF WBC: CPT

## 2024-10-02 PROCEDURE — 25010000002 PALONOSETRON PER 25 MCG: Performed by: INTERNAL MEDICINE

## 2024-10-02 PROCEDURE — 25810000003 SODIUM CHLORIDE 0.9 % SOLUTION: Performed by: INTERNAL MEDICINE

## 2024-10-02 PROCEDURE — G0498 CHEMO EXTEND IV INFUS W/PUMP: HCPCS

## 2024-10-02 PROCEDURE — 96375 TX/PRO/DX INJ NEW DRUG ADDON: CPT

## 2024-10-02 PROCEDURE — 25810000003 SODIUM CHLORIDE 0.9 % SOLUTION 250 ML FLEX CONT: Performed by: INTERNAL MEDICINE

## 2024-10-02 PROCEDURE — 81003 URINALYSIS AUTO W/O SCOPE: CPT

## 2024-10-02 PROCEDURE — 25010000002 BEVACIZUMAB 100 MG/4ML SOLUTION 4 ML VIAL: Performed by: INTERNAL MEDICINE

## 2024-10-02 PROCEDURE — 25010000002 FLUOROURACIL PER 500 MG: Performed by: INTERNAL MEDICINE

## 2024-10-02 PROCEDURE — 25010000002 FOSAPREPITANT PER 1 MG: Performed by: INTERNAL MEDICINE

## 2024-10-02 PROCEDURE — 96413 CHEMO IV INFUSION 1 HR: CPT

## 2024-10-02 PROCEDURE — 25010000002 BEVACIZUMAB PER 10 MG: Performed by: INTERNAL MEDICINE

## 2024-10-02 PROCEDURE — 96367 TX/PROPH/DG ADDL SEQ IV INF: CPT

## 2024-10-02 RX ORDER — PALONOSETRON 0.05 MG/ML
0.25 INJECTION, SOLUTION INTRAVENOUS ONCE
Status: COMPLETED | OUTPATIENT
Start: 2024-10-02 | End: 2024-10-02

## 2024-10-02 RX ORDER — SODIUM CHLORIDE 9 MG/ML
20 INJECTION, SOLUTION INTRAVENOUS ONCE
Status: COMPLETED | OUTPATIENT
Start: 2024-10-02 | End: 2024-10-02

## 2024-10-02 RX ORDER — SODIUM CHLORIDE 9 MG/ML
1000 INJECTION, SOLUTION INTRAVENOUS CONTINUOUS
Status: CANCELLED | OUTPATIENT
Start: 2024-10-04

## 2024-10-02 RX ORDER — SODIUM CHLORIDE 9 MG/ML
20 INJECTION, SOLUTION INTRAVENOUS ONCE
Status: CANCELLED | OUTPATIENT
Start: 2024-10-02

## 2024-10-02 RX ORDER — PALONOSETRON 0.05 MG/ML
0.25 INJECTION, SOLUTION INTRAVENOUS ONCE
Status: CANCELLED | OUTPATIENT
Start: 2024-10-02

## 2024-10-02 RX ADMIN — DEXAMETHASONE SODIUM PHOSPHATE 12 MG: 10 INJECTION, SOLUTION INTRAMUSCULAR; INTRAVENOUS at 11:43

## 2024-10-02 RX ADMIN — LEUCOVORIN CALCIUM 560 MG: 350 INJECTION, POWDER, LYOPHILIZED, FOR SUSPENSION INTRAMUSCULAR; INTRAVENOUS at 12:34

## 2024-10-02 RX ADMIN — BEVACIZUMAB 450 MG: 400 INJECTION, SOLUTION INTRAVENOUS at 12:02

## 2024-10-02 RX ADMIN — FLUOROURACIL 3360 MG: 50 INJECTION, SOLUTION INTRAVENOUS at 13:06

## 2024-10-02 RX ADMIN — PALONOSETRON HYDROCHLORIDE 0.25 MG: 0.25 INJECTION INTRAVENOUS at 11:43

## 2024-10-02 RX ADMIN — SODIUM CHLORIDE 20 ML/HR: 9 INJECTION, SOLUTION INTRAVENOUS at 11:11

## 2024-10-02 RX ADMIN — FOSAPREPITANT 100 ML: 150 INJECTION, POWDER, LYOPHILIZED, FOR SOLUTION INTRAVENOUS at 11:11

## 2024-10-02 NOTE — PROGRESS NOTES
REASON FOR FOLLOW UP:     Rectal cancer, rectal ultrasound examination in July 2019 reported T3N0 disease without lymphadenopathy. She does have small pulmonary nodule 6-7 mm and 2 mm with indeterminate feature. There is no solid evidence of metastatic disease otherwise. Patient has stage IIA (T3N0M0) disease.  The patient is heterozygous factor V Leiden, was on prophylactic anticoagulation with Lovenox 40 mg daily given her increased risk of thrombosis with MediPort and GI malignancy.   PET scan on 8/8/2019 reported an 8 mm hypermetabolic right upper lobe pulmonary nodule, which is suspicious for metastatic as well.    Patient had a PowerPort placement on the left upper chest by Dr. Joseph on 8/9/2019.  Patient was started on concurrent infusional 5-FU chemoradiation therapy on 8/12/2019, with planned complete surgical resection and further adjuvant chemotherapy with intention to cure the disease.   Patient underwent surgical resection of the primary rectal cancer by Dr. Ye on 12/2/2019.  Pathology evaluation reported residual T3N1 disease stage IIIb.  Diarrhea related to therapy and radiation.   Patient was started cycle 1 day 1 of adjuvant FOLFOX 6 on 1/23/2020.  On 2/5/2020 FOLFOX 6 cycle 2 delayed secondary to neutropenia.  Patient was given 3 days of Granix injection.  After cycle #2 we planned 3 days of Granix with each cycle.   Patient also intolerant of oral iron.  Patient received 2 doses of IV injectafer on 02/05/2020 and 02/12/2020.   02/12/2020 Proceed with cycle #2 of FOLFOX 6 with 3 days of Granix.  On 3/11/2020 cycle 4 postponed secondary to abdominal pain and occasional low-grade fevers.  CT scans ordered.  Cycle #4 resumed after CT scan revealed no evidence of disease.  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 Ye. patient has no fever.  Continue to observe.   Grade 3 hand-foot syndrome secondary to 5-FU after cycle #7  FOLFOX 6 chemotherapy, prompting ER visit.  Also has worsening peripheral neuropathy.   Cycle #8 FOLFOX 6 was given on 5/13/2020, with 50% dose reduction for both 5-FU and oxaliplatin, and also examination of bolus 5-FU.   PET scan examination on 5/21/2020 reported no evidence of metastatic disease in the chest abdomen pelvis.  Stable 6 mm RUL pulmonary nodule.  On 5/27/2020, I discussed with the patient that we can discontinue chemotherapy at this time.   Patient had a surgical procedure for low anterior colon resection, coloanal anastomosis on 8/3/2020.  CT scan for chest abdomen pelvis on 9/9/2020 reported a new 8 mm noncalcified pulmonary nodule in the left lower lobe of the lung.  Otherwise stable right upper lobe 6 mm pulmonary nodule, and no evidence of disease recurrence in the abdomen or pelvis.  PET scan examination on 9/18/2020 reported multiple hypermetabolic small pulmonary nodules/ new pulmonary nodules.   Patient was started on pelvic chemotherapy with FOLFIRI regimen on 10/13/2020.   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 It reported tumor mutation burden 5 Muts/Mb, microsatellite stable, TP53 mutation [id number], and others.   Cycle #5 was without irinotecan, due to peripheral neuropathy.  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  Patient was switched to weight-based Lovenox injection.  Cycle #6 5-FU and irinotecan was delayed by 2 weeks because of the above incident.  Patient had a telemedicine evaluation at that the [facility] [place] in February 2021.  They agreed with our treatment plan for palliative chemotherapy followed by maintenance chemotherapy.    PET scan examination on 4/6/2021 after cycle #12 palliative chemotherapy reported no evidence of hypermetabolic metastatic lesion.   Patient was started on maintenance chemotherapy with 5-FU and  Avastin on 4/13/2021. (Unable to tolerate Xeloda because of a significant hand-foot syndrome).   PET scan on 9/24/2021 obtained after cycle #12 maintenance chemotherapy with 5-FU, leucovorin, Avastin reported no evidence for active disease. We recommended 3 months chemotherapy holiday.  CT scan examination on 3/15/2022 reported slightly disease progression with increase in size of small pulmonary nodule.  We started patient back on maintenance chemotherapy on 3/29/2022 treatment with 5-FU plus leucovorin and Avastin, repeating every 2 weeks.  After 6 more maintenance chemotherapy, CT scan for chest abdomen pelvis was done on 6/21/2022 which reported stable sub-6 mm pulmonary nodules.  Patient had last maintenance chemotherapy on 6/7/2022.   Disease progression, patient was restarted back on chemotherapy with 5-FU leucovorin and bevacizumab 8/21/2024      HISTORY OF PRESENT ILLNESS:  The patient is a 44 y.o. female with the above-mentioned history, who is seen back today for evaluation.    History of Present Illness  The patient is here today, 09/18/2024, for an evaluation before her third cycle of palliative chemotherapy with 5-FU, leucovorin, and bevacizumab.    She underwent a colonoscopy by Dr. Ye on 09/11/2024, during which a random biopsy of the sigmoid colon was taken. The pathology report showed no hyperplastic or tubulovillous changes, significant inflammation, corpus distortion, basement membrane thickening, viral inclusion, or other organisms. The procedure report noted patchy mild inflammation in the descending colon, characterized by congestion/edema. Biopsies were taken with cold forceps. Evidence of previous endo-anal anastomosis was found in the rectum, along with hemorrhoids.    She reports that her second cycle of chemotherapy on 09/04/2024 was challenging due to severe diarrhea. Despite receiving fluids and an injection, her condition only improved temporarily. A flexible sigmoidoscopy performed  by Dr. Ye revealed large internal hemorrhoids, for which she was prescribed hydrocortisone cream. However, her diarrhea returned after the procedure, making it difficult for her to eat. She received another injection on Monday, which caused nausea but slowed down her diarrhea.    She has been taking Imodium and Lomotil (2 tablets, 4 times a day) for two years and recently started taking Pepto-Bismol three times a day as per Dr. Ye's advice. This has resulted in solid black stools. She believes her symptoms may be due to colitis, as indicated by her PET scan, and possibly related to antibiotic use. She is considering adding octreotide to her regimen for home use in case of diarrhea onset. She is currently not on any antibiotics and is trying to establish a pattern to manage her symptoms.    INTERVAL HISTORY:  The patient is here today, 10/02/2024, for an evaluation prior to her cycle#4 palliative chemotherapy with 5-Fu, leucovorin and Avastin.    She typically self-administers the octreotide injection once daily, but there have been two instances where she had to take it twice in a day. She has been on a 100 mcg dose since mid last week.  Overall patient reports her diarrhea is a lot better now since started on octreotide.    Because of improved symptoms, she did not have octreotide injection yesterday.  As a consequence, she experienced a challenging day yesterday due to diarrhea. Initially, her stool was formed but soft, so she did not administer her octreotide injection. However, as the day progressed, she had multiple bowel movements, which kept her awake throughout the night. Consequently, she took her octreotide injection at 5:00 AM this morning.       Results  Laboratory Studies  CBC: HB 13.7, WBC 8520, neutrophils 6610, platelets 166,000. Renal function: creatinine 0.70. Liver function panel: normal. Glucose: 129. Electrolytes: normal.        Past Medical History:   Diagnosis Date    Abdominopelvic  abscess 08/12/2020    ADMITTED TO [facility]    Abnormal Pap smear of cervix 02/02/1998    JULIUS I    Abscess of abdominal wall 11/28/2012    SEEN AT [facility] ER    Anemia in pregnancy     Anxiety     Bilateral epiphora 04/2020    Bilateral hand swelling 05/02/2020    SEEN AT [facility] ER    Cervical lymphadenitis 06/06/2012    SEEN AT [facility] ER    Chemotherapy induced diarrhea 01/2021    Chemotherapy induced neutropenia 04/2020    Chemotherapy-induced nausea 02/2020    Chemotherapy-induced thrombocytopenia 05/2020    Chronic anticoagulation     Chronic diarrhea     Colon polyps     FOLLOWED BY DR. 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 ALEXANDRU HUNT    History of gestational diabetes     History of pre-eclampsia 1998    History of pre-eclampsia     History of radiation therapy 2019    FOLLOWED BY DR. 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 GERONIMO ESPARZA    Lump of right breast      SWOLLEN LYMPH NODE    Lung cancer 2020    METASTATIC LUNG CANCER    Macrocytosis 2021    Monopolar depression     On anticoagulant therapy     Pain associated with surgical procedure 2020    Palmar-plantar erythrodysesthesia 2021    Perirectal abscess 2020    Pulmonary embolism without acute cor pulmonale 09/20/2019    X 3; ADMITTED TO [facility]    Pulmonary nodule, right 2020    Rectal cancer 2019    STAGE IIA, INVASIVE MODERATELY DIFFERENTIATED ADENOCARCINOMA, CHEMO AND XRT FINISHED 2019    Right shoulder pain 2020    SEEN AT [facility] ER    Right ureteral stone 10/01/2019    SEEN AT [facility] ER    SAB (spontaneous ) 1996     A2-1 INDUCED    Sciatica     Sepsis due to cellulitis 2002    LEFT GREAT TOE, ADMITTED TO [facility]    Tachycardia 2020    Thrombosis of superior vena cava 2020    AND BRACHIOCEPHALIC VEIN, ADMITTED TO [facility]    Urinary urgency 2020   Patient had COVID-19 infection diagnosed on 2022 despite was fully vaccinated and boosted.  She recovered without problem.      Past Surgical History:   Procedure Laterality Date    ABDOMINAL SURGERY      CHOLECYSTECTOMY N/A 10/10/2003    LAPAROSCOPIC WITH CHOLANGIOGRAM, DR. JAMEY TALAVERA AT [facility]    COLON RESECTION N/A 2019    Procedure: laparoscopic low anterior colon resection with mobilization of splenic flexure and diverting loop ileostomy: ERAS;  Surgeon: Padmaja Esparza MD;  Location: [facility];  Service: General    COLON RESECTION N/A 2020    Procedure: LOW ANTERIOR COLON RESECTION, COLOANAL ANASTOMOSIS, MOBILIZATION SPLENIC FLEXURE;  Surgeon: Padmaja Esparza MD;  Location: [facility];  Service: General;  Laterality: N/A;    COLONOSCOPY N/A 07/15/2020    PATENT ANASTAMOSIS IN MID RECTUM, RESCOPE IN 1 YR, DR. PADMAJA ESPARZA AT [facility]    COLONOSCOPY N/A 2022    DIFFUSE AREA OF MODERATELY FRIABLE MUCOSA IN ENTIRE COLON , DIVERSION COLITIS, PATENT AND HEALTHY ANASTAMOSIS,   PADMAJA ESPARZA AT [facility]    COLONOSCOPY N/A 12/04/2023    6 MM SESSILE SERRATED ADENOMA POLYP IN DESCENDING, PATENT ANASTAMOSIS IN DISTAL RECTUM, RESCOPE IN 2 YRS, DR. PADMAJA ESPARZA AT [facility]    COLONOSCOPY W/ POLYPECTOMY N/A 07/08/2019    15 MM TUBULOVILLOUS ADENOMA POLYP IN HEPATIC FLEXURE, 20 MMTUBULOVILLOUS ADENOMA WITH HIGH GRADE DYSPLASIA IN RECTOSIGMOID, 4 CM MASS IN MID RECTUM, PATH: INVASIVE MODERATELY DIFFERENTIATED ADENOCARCINOMA, DR. JENNIFER LI AT [facility]    DILATATION AND EVACUATION N/A 2009    ENDOSCOPY N/A 07/08/2019    LA GRADE A ESOPHAGITIS, GASTRITIS, ALL BIOPSIES BENIGN, DR. JENNIFER LI AT [facility]    ILEOSTOMY CLOSURE N/A 11/14/2022    Procedure: ILEOSTOMY TAKEDOWN;  Surgeon: Padmaja Esparza MD;  Location: [facility] OR;  Service: General;  Laterality: N/A;    INCISION AND DRAINAGE PERIRECTAL ABSCESS N/A 08/14/2020    Procedure: INCISION AND DRAINAGE OF retrorectal dehiscence abcess with drain placement and irrigation;  Surgeon: Padmaja Esparza MD;  Location: [facility] OR;  Service: General;  Laterality: N/A;    PAP SMEAR N/A 01/24/2014    SIGMOIDOSCOPY N/A 07/24/2019    INFILTRATIVE PARTIALLY OBSTRUCTING LARGE RECTAL CANCER, AREA TATOOED, DR. PADMAJA ESPARZA AT [facility]    SIGMOIDOSCOPY N/A 11/23/2019    AN ULCERATED PARTIALLY OBSTRUCTING MASS IN MID RECTUM, AREA TATTOOED, DR. PADMAJA ESPARZA AT [facility]    SIGMOIDOSCOPY N/A 07/22/2021    PATENT ANASTAMOSIS IN DISTAL RECTUM, RESCOPE IN 1 YR, DR. PADMAJA ESPARZA AT [facility]    SIGMOIDOSCOPY N/A 09/11/2024    PATCHY INFLAMMATION AND EDEMA IN DESCENDING, AREA BIOPSIED AND WAS BENIGN, PATENT AND HEALTHY ANASTAMOSIS, HEMORRHOIDS,RESCOPE(CY) 12/2025, DR. PADMAJA ESPARZA AT [facility]    TRANSRECTAL ULTRASOUND N/A 07/24/2019    Procedure: ULTRASOUND TRANSRECTAL;  Surgeon: Padmaja Esparza MD;  Location: [facility];  Service: General    URETEROSCOPY LASER LITHOTRIPSY WITH STENT INSERTION Right 10/30/2019    DR. ESTUARDO BEASLEY AT [facility]  DELIVERY N/A 09/18/1998    VENOUS ACCESS DEVICE (PORT) INSERTION Left 08/09/2019    Procedure: INSERTION VENOUS ACCESS DEVICE;  Surgeon: Sj Joseph MD;  Location: [facility];  Service: General    VENOUS ACCESS DEVICE (PORT) INSERTION N/A 12/18/2020    Procedure: INSERTION VENOUS ACCESS DEVICE right side, removal venous access device left side;  Surgeon: Sj Joseph MD;  Location: [facility];  Service: General;  Laterality: N/A;    WISDOM TOOTH EXTRACTION Bilateral 1993       HEMATOLOGIC/ONCOLOGIC HISTORY:      The patient reports she has intermittent rectal bleeding and urgency, mucous with her stool, starting sometime in 2016. At that time she was referred to GI service, and was diagnosed of irritable bowel syndrome. Nevertheless she had worsening urgency for bowel movement, and had a couple of incidents losing control of stool. She was recently seen by Roland Thorpe MD again and had colonoscopy and EGD exam on 07/08/2019. She was found having a circumferential rectal mass. According to the procedure note, the patient had a fungating circumferential bleeding 4 cm mass in the middle rectum, at distance between 13 cm and 17 cm from the anus. Mass was causing partial obstruction. There were also colon polyps found at the hepatic flexure and also at the rectosigmoid junction 23 cm from the anus. Both were resected and retrieved. EGD examination reported grade A esophagitis at the GE junction and patchy discontinuous erythema and aggravation of the mucosa without bleeding in the stomach body. There is normal mucosa of the duodenum. Pathology evaluation from this procedure reported moderately differentiated adenocarcinoma involving the rectal mass. The rectal sigmoid junction polyp was tubular/tubulovillous adenoma with high grade dysplasia negative for invasive malignancy. The hepatic flexure polyp was also tubular/tubulovillous adenoma negative for high grade dysplasia or malignancy. The biopsy from  the esophagus reports squamocolumnar mucosa with inflammatory changes suggestive of mild reflux esophagitis, negative for interstitial metaplasia. Gastric biopsy was benign and duodenal biopsy was also benign. There is no mention of H-pylori.     Patient was subsequently referred to colorectal surgeon Padmaja Ye MD for further evaluation. The patient had CT scan examination for chest, abdomen and pelvis requested by Dr. Ye and were done on 07/13/2019. The study reported no evidence of lymphadenopathy in the abdomen and pelvis. There was wall thickening of the rectosigmoid junction. Normal spleen, pancreas, adrenal glands and kidneys. There was fatty liver infiltration but no focal lymphatic lesions. There was a small 6-7 mm noncalcified nodule in the right upper lobe and a tiny 3 mm subpleural nodule in the right middle lobe. No mediastinal or hilar lymphadenopathy.     Dr. Ye performed staging rectal ultrasound examination. This study reported tumor penetrating through the muscularis propria as T3 disease; there were no lymph nodes identified.    She had a hospitalization in late September 2019 because of newly discovered pulmonary emboli.  She was on prophylactic Lovenox prior to the incident of PE.  Now she is on full dose Xarelto anticoagulation.  Patient reports no further chest pain dyspnea.  She denies bleeding or bruising.  During her hospitalization, she was seen by Dr. Ye, who plans to have surgery 8 to 12 weeks after finishing radiation therapy.  She finished her radiation on 9/19/2019.     I noticed patient also presented to the emergency room on 10/1/2019 complaining of right flank area pain.  I reviewed the images studies and indeed she has a very small 1 to 2 mm obstructing kidney stone in the UV junction.  Patient is still symptomatic with some pains and dysuria.  She denies fever sweating or chills.    Laboratory study on 10/7/2019 reported normal CBC including hemoglobin 13.1, platelets  301,000, WBC 6170 and ANC 4900 lymphocytes 590 monocytes 480.      The nurse reported malfunction of port-a-catheter on 10/7/2019.  Port study on 10/8/2019 showed fibrin sheath around the distal aspect of the Mediport catheter in the SVC. This does not appear to hinder injection, but does prevent aspiration at this time.    Patient underwent surgical resection of the colon on 12/2/2019 with Dr. Ye.  Pathology evaluation reported residual T3 disease, also 1 out of 14 lymph nodes positive for malignancy.  So this patient in either had at least stage IIIb disease (T3 N1 M0?).      Adjuvant chemotherapy FOLFOX 6 will be started on 1/22/2020.  Laboratory study reported iron saturation 10%, free iron 31 TIBC 319 and ferritin 168.  Her hemoglobin was 11.8, WBC 5600, and platelets 347,000.    Patient was here on 02/12/2020 for cycle 2 of her FOLFOX.  This is delayed x1 week secondary to neutropenia.  The patient ultimately received 3 days worth of Neupogen with recovery of her blood counts.  Of note, the patient struggled with significant nausea without any episodes of vomiting following cycle #1 of chemotherapy resulting in significant weight loss.  She is up 12 pounds over the last week as her appetite has normalized.  We will add Emend to her care plan.    She is having several loose stools per her colostomy and has been hesitant to take Imodium due to prior history of constipation.  I encouraged her to try this with a maximum of 8 tablets/day.  She denies any infectious symptoms including fevers or chills.  No excess bleeding or bruising noted.  She had the expected cold sensitivity related to the Oxaliplatin for about 3 days following treatment.    Labs from 02/12/2020 demonstrated total white blood cell count of 5.14, ANC of 3.06, hemoglobin of 11.2, platelets of 211,000.  She was found to be iron deficient last week and is intolerant to oral iron secondary to GI distress.  For this reason, she initiated IV iron  therapy with Injectafer last week.  She had received her second dose 02/12/2020    Patient has normalized hemoglobin 12.2 on 2/26/2020.    She reported on 5/5/2020 she had a recent visit to the emergency department for what was suspected to be an allergic reaction.  She called our on-call service on Saturday with reports of hand and face swelling.  She was instructed to proceed to the emergency department at which time she was assessed and prescribed a Medrol Dosepak.  She had just completed her 5-FU and was unhooked on Friday, 5/3/2020.  Her symptoms have improved.  She does report persistent hyperpigmentation and mild swelling of the palms of the hands but this is much improved.  It was her right hand specifically that was swollen.  Her facial swelling has resolved.  She continues on Cefdinir nd since has 1 day remaining, she was prescribed cefdinir for a UTI requiring hospitalization at the end of April.  Reports no new symptoms.  Her labs are stable.  She is scheduled for treatment again.    Patient states at this time she is not tolerating her chemotherapy well.     Patient seen in the emergency department on 5/2/2020 for what was suspected to be an allergic reaction.  She called our on-call service on Saturday explaining that she was experiencing hand and face swelling.  She was instructed to proceed to the emergency department at which time she was assessed and prescribed a Medrol Dosepak.  She had just completed her 5-FU and was unhooked on Friday, 5/1/2020.      She reports since her ED visit on 5/2/2020 her symptoms have not improved. Her hands and feet were swollen upon presenting to the ED and she could barely make a fist. She states that she feels so swollen she is not able to stand it. She states that her skin on the hands and feet are peeling extensively as well, besides changing color to more dark.     She also reports significant fatigue after her first week of the 5-FU treatment but she expected  this side effect. She also notices significant increase in her neuropathy to the point that she is not able to even walk around in her home without her house slippers due to irritation from her rugs. She denies and associated nausea or vomiting at this time. She also has episodes of epistaxis every day after the chemotherapy cycle #7.  She does report working full-time during the week of chemotherapy.    Laboratory studies, 5/13/2020, show moderate thrombocytopenia platelets 123,000, low normal WBC 4140 including ANC 2720 and normal hemoglobin 13.4.  Because significant hand-foot syndrome, will decrease both 5-FU and oxaliplatin by 50%, and eliminate bolus dose of 5-FU.    On 5/21/2020 patient had a PET scan performed which showed no convincing evidence of residual disease in abdomen or pelvis, no metastatic disease within the chest or neck.     Cycle #8 FOLFOX 6 was given on 5/13/2020, with 50% dose reduction for both 5-FU and oxaliplatin, and also examination of bolus 5-FU.  She states on 5/27/2020 that with the recent reduction of the chemotherapy she feels significantly better. She has more energy and is able to do her daily routine.      PET scan examination on 5/21/2020 reported no evidence of metastatic disease in chest abdomen pelvis.  There was a stable 6 mm right upper lobe pulmonary nodule.    Laboratory studies on 5/27/2020 showed mild leukocytopenia WBC 3720 but a normal ANC 2250 and lymphocytes 630.  Normal platelets 163,000 and hemoglobin 12.6.  Chemistry lab reported normal renal function, liver function panel and a electrolytes, elevated glucose 164.    Laboratory studies 6/24/2020, showed normal hemoglobin 13.4 but macrocytosis .9.  Normal platelets 210,000 and WBC 5870.  She had normal CMP.     Patient last time was here in late June 2020.  Since that time she had surgical procedure for low anterior colon resection, coloanal anastomosis on 8/3/2020.  She later developed a perirectal  abscess, required surgical drainage on 8/14/2020.  She was discharged on 8/27/2020.    Patient reports to me she still has lower abdominal wall vacuum suction in place.  She denies fever sweating chills.  Performance status is ECOG 1.  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  She does have visiting nurse come to the home for wound care.    CT scan for chest abdomen pelvis on 9/9/2020 reported a new 8 mm noncalcified pulmonary nodule in the left lower lobe.  Otherwise stable right upper lobe 6 mm pulmonary nodule, and no evidence of disease recurrence in the abdomen or pelvis.     Laboratory study on 9/16/2020 reported elevated AST 55, ALT 95, alk phosphatase 143 but normal total bilirubin 0.3.  Chemistry lab otherwise unremarkable.  She has completed normal CBC.      Because of the abnormal CT scan examination for chest abdomen pelvis on 9/9/2020 reported a new 8 mm noncalcified pulmonary nodule in the left lower lobe, we obtained a PET scan examination for further evaluation, which was done on 9/18/2020.  This study reported several pulmonary nodules, some of them are hypermetabolic, newly developed compared to previous PET scan in May 2020.  Certainly does highly suspicious for metastatic disease.  There are also hypermetabolic activity in the abdomen and pelvic area which is hard to differentiate from surgical scar versus metastatic malignancy.    Laboratory study on 10/13/2020 reported normal CBC with Hb 13.9, platelets 302,000 and WBC 5520 including ANC 3830.  Chemistry lab reported normalized AST 20, alk phosphatase 116 improved ALT 35, and maintains normal bilirubin, with normal electrolytes and liver function panel.     Patient was started on first cycle of palliative chemotherapy FOLFIRI on 10/13/2020.    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  Patient had a new port placed 12/18/2020, and had held her Xarelto for 2 days prior to surgery.  She presented to the ER on 12/21/20 with complaints of facial and arm swelling for 3 days.  She also noted increased shortness of breath.  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  Thrombus within the right internal jugular vein cannot be excluded.  Patient was started on IV heparin, and eventually transitioned to weight-based Lovenox, which she now continues.    PET scan examination on 9/24/2021 reported further shrinking of the tiny right upper lobe pulmonary nodule.  Otherwise no new lesions.  No evidence for metastatic disease in other areas.      Patient had CT scan for chest with IV contrast obtained on 12/14/2021 which reported small tiny stable pulmonary nodules. There is a 4 mm right upper lobe pulmonary nodule. There is also a stable 4 mm left lower lobe pulmonary nodule. There is also stable left upper lobe micronodule.     Laboratory studies today on 12/21/2021 reported normal hemoglobin 15.9 however .0, platelets 218,000 WBC 5360 including neutrophils 3730 lymphocytes 980. Chemistry lab reported normal renal function, electrolytes, glucose, and marginally elevated ALT 55, AST 34 but normal total bilirubin and alk phosphatase.     CT scan for chest abdomen pelvis 6/21/2022 reported sub-6 mm pulmonary nodules either stable or slightly smaller.  No new pulmonary nodules or evidence for disease progression.  There is no evidence for metastatic disease in the liver however it shows diffuse hepatic steatosis.  There was masslike thickening in the presacral space with the clips or calcification approximately 1.7 cm in greatest AP dimension but stable.    Laboratory study on 9/20/2022 reported normal hemoglobin 15.7 with mild macrocytosis .1.  She has normal CBC and platelets.  She also has unremarkable CMP.  Normal CEA 1.13  ng/mL.    Patient was seen by Dr. Ye, who performed ileostomy takedown on 11/14/2022.  Patient reports that she is recovering.  She still has a small open wound less than 1 cm in diameter, however close to 2 cm deep.  She has been changing dressing herself.  She denies fever sweating chills.    Patient has no other specific complaints.  She has excellent performance status ECOG 0.  She denies chest pain dyspnea cough hemoptysis.  No abdominal pain.  No melena hematochezia.  Patient has been eating well, stable weight.  She works full-time.    She continues Lovenox injections and denies any significant bleeding issues.   No other new problems or concerns.     CT scan for chest abdomen pelvis obtained on 1/10/2023 reported stable small pulmonary nodules, no evidence for active or new disease.    Laboratory study on 1/10/2023 reported normal CBC and normal CMP.  CEA level is 0.99 ng/mL.    CT scan for chest, abdomen, and pelvis on 7/7/2023 reported stable small pulmonary nodules including a left upper lobe 5 mm nodule. There were no new masses, or pleural effusion. No enlarged supraclavicular, axillary, mediastinal, or hilar lymphadenopathy. Mediastinal vasculature normal. There is occlusion of the superior vena around the catheter with body wall  is present. There was no evidence for disease recurrence in the abdomen or pelvis. Bone is also negative for metastatic disease.    Laboratory studies obtained on 07/07/2023 also reported normal CBC, and normal CMP as well as low level CEA 1.07 ng/mL.    The CT scan for the chest on 10/27/2023 reported enlarging pulmonary nodules bilaterally. The right upper lobe lung nodule increased from 7 x 7 mm to 9 x 8 mm, and the left upper lobe nodule measured 8 x 9 mm from 5 x 6 mm in 04/2023. There is a different left upper lobe nodule 6 x 8 mm from previous 5 x 5 mm. The presacral tissue thickness measures up to 2.3 cm.    A laboratory study on 10/27/2023 reported CEA  1.58 ng/mL, normal CBC, and CMP.  Molecular study of peripheral blood Guardant Reveal is still pending.    The patient presents today on 11/17/2023 for reevaluation to discuss the results of PET scan examination as well as the results of tumor conference. I saw her recently on 11/03/2023 and because of enlarging pulmonary nodules on the CT scan, we requested a PET scan examination. I presented her case yesterday on 11/16/2023 at the [facility].    The patient reports she is at her baseline condition as 2 weeks ago. No specific complaints, no chest pain, dyspnea, cough, etc. She has a regular bowel movement.    Her case was present at the [facility]. on 11/16/2023. The PET scan images and chest CT scan were reviewed in conjunction with her treatment history. The consensus was for patient to receive stereotactic radiation therapy for the right upper lobe lung lesion, and the bigger left upper lobe lesion, and monitor the smaller left upper lobe lesion with further images studies down the line. Also, the conference recommended Guardant Reveal study which we already ordered.     This Guardant Reveal study came back today as negative for ctDNA 0%.        CT of the chest on 2/2/2024 reported shrinking of the right upper lobe lesion from 9 mm to 6 mm, and the left upper lobe lesion also decreased from 9 mm to 6 mm. Otherwise, there are a couple of stable pulmonary nodules, 7 to 8 mm. The right hilar has a small lymph node that has increased from 6 mm to 8 mm and is otherwise stable. There was no suspicious lesion in the abdomen or pelvis.    Colonoscopy examination 9/11/2024 showed patchy mild inflammation characterized by congestion/edema in the descending colon. Evidence of previous endo coloanal anastomosis in the rectum. Presence of hemorrhoids.      MEDICATIONS    Current Outpatient Medications:     bismuth subsalicylate (PEPTO BISMOL) 262 MG/15ML suspension, Take 15 mL by mouth  4 (Four) Times a Day., Disp: , Rfl:     clonazePAM (KlonoPIN) 1 MG tablet, Take 1 tablet as needed daily for anxiety. May take one additional as needed for severe anxiety., Disp: 45 tablet, Rfl: 3    Cyanocobalamin (VITAMIN B-12 PO), Take 1 tablet by mouth 3 (Three) Times a Week. M-W-F, Disp: , Rfl:     dicyclomine (BENTYL) 20 MG tablet, TAKE 1 TABLET BY MOUTH EVERY 6 (SIX) HOURS AS NEEDED FOR IRRITABLE BOWEL SYMPTOMS (Patient taking differently: Take 1 tablet by mouth Every Evening.), Disp: 360 tablet, Rfl: 2    diphenoxylate-atropine (LOMOTIL) 2.5-0.025 MG per tablet, TAKE 2 TABLETS BY MOUTH 4 TIMES A DAY, Disp: 240 tablet, Rfl: 11    Enoxaparin Sodium (LOVENOX) 100 MG/ML solution prefilled syringe syringe, INJECT 1 ML UNDER THE SKIN INTO THE APPROPRIATE AREA AS DIRECTED EVERY 12 (TWELVE) HOURS., Disp: 60 mL, Rfl: 2    escitalopram (LEXAPRO) 10 MG tablet, Take 1 tablet by mouth Daily. (Patient taking differently: Take 1 tablet by mouth Every Evening.), Disp: 90 tablet, Rfl: 1    famotidine (PEPCID) 20 MG tablet, TAKE 1 TABLET BY MOUTH TWICE A DAY (Patient taking differently: Take 1 tablet by mouth Every Evening.), Disp: 180 tablet, Rfl: 2    hydrocortisone 2.5 % cream, APPLY RECTALLY 3 TIMES DAILY. INCLUDE APPLICATOR., Disp: , Rfl:     Hydrocortisone, Perianal, (Anusol-HC) 2.5 % rectal cream, Apply rectally 3 times daily.  Include applicator., Disp: 30 g, Rfl: 1    Imvexxy Maintenance Pack 10 MCG insert, Insert 10 mcg into the vagina 2 (Two) Times a Week., Disp: , Rfl:     Loperamide HCl (IMODIUM PO), Take  by mouth As Needed., Disp: , Rfl:     Loratadine 10 MG capsule, Take 1 capsule by mouth Every Evening., Disp: , Rfl:     metoprolol succinate XL (TOPROL-XL) 25 MG 24 hr tablet, TAKE 0.5 TABLETS BY MOUTH EVERY NIGHT, Disp: 45 tablet, Rfl: 2    nystatin-hydrocortisone-bacitracin in zinc oxide ointment, Apply  topically to the appropriate area as directed Daily., Disp: 60 g, Rfl: 2    octreotide (sandoSTATIN) 100  "MCG/ML injection, Inject 1 mL under the skin into the appropriate area as directed 3 (Three) Times a Day., Disp: 84 mL, Rfl: 1    ondansetron (ZOFRAN) 8 MG tablet, TAKE 1 TABLET BY MOUTH THREE TIMES A DAY AS NEEDED FOR NAUSEA AND VOMITING, Disp: 60 tablet, Rfl: 1    pantoprazole (Protonix) 40 MG EC tablet, Take 1 tablet by mouth Daily., Disp: 90 tablet, Rfl: 1    rosuvastatin (CRESTOR) 5 MG tablet, Take 1 tablet by mouth Daily. (Patient taking differently: Take 1 tablet by mouth Every Night.), Disp: 90 tablet, Rfl: 0    ALLERGIES:   No Known Allergies    SOCIAL HISTORY:       Social History     Tobacco Use    Smoking status: Every Day     Current packs/day: 1.00     Average packs/day: 1 pack/day for 25.0 years (25.0 ttl pk-yrs)     Types: Cigarettes     Passive exposure: Current    Smokeless tobacco: Never    Tobacco comments:     1 PPD/caffeine use    Vaping Use    Vaping status: Some Days    Substances: Nicotine    Devices: Disposable    Passive vaping exposure: Yes   Substance Use Topics    Alcohol use: Not Currently     Comment: RARELY    Drug use: Never         FAMILY HISTORY:   Mother has positive factor V Leiden mutation, although she did not have thrombosis, mother also is coronary disease with stenting, she also is occasional bruising.    Maternal grandmother had DVT, she had multiple surgical procedures.    Patient mother's half-brother had metastatic colon cancer at diagnosis in his 50s.    Maternal great aunt had breast cancer.           Vitals:    10/02/24 1019   BP: 104/68   Pulse: 84   Resp: 16   Temp: 98.2 °F (36.8 °C)   TempSrc: Oral   SpO2: 96%   Weight: 87 kg (191 lb 14.4 oz)   Height: 167.6 cm (65.98\")   PainSc: 0-No pain           10/2/2024    10:23 AM   Current Status   ECOG score 0     Physical Exam      Physical Exam  Vitals reviewed.   Constitutional:       Appearance: Normal appearance. She is well-developed.   HENT:      Head: Normocephalic and atraumatic.      Comments:         Nose: Nose " normal.   Eyes:      Conjunctiva/sclera: Conjunctivae normal.   Cardiovascular:      Rate and Rhythm: Normal rate and regular rhythm.   Pulmonary:      Effort: Pulmonary effort is normal.      Breath sounds: Normal breath sounds.   Abdominal:      General: Bowel sounds are normal. There is no distension.      Palpations: Abdomen is soft. There is no mass.      Tenderness: There is no abdominal tenderness.   Musculoskeletal:      Right lower leg: No edema.      Left lower leg: No edema.   Skin:     Findings: No rash.   Neurological:      Mental Status: She is alert. Mental status is at baseline.   Psychiatric:         Mood and Affect: Mood normal.           RECENT LABS:  Results from last 7 days   Lab Units 10/02/24  0957   WBC 10*3/mm3 8.52   NEUTROS ABS 10*3/mm3 6.61   HEMOGLOBIN g/dL 13.7   HEMATOCRIT % 42.3   PLATELETS 10*3/mm3 166     Results from last 7 days   Lab Units 10/02/24  0957   SODIUM mmol/L 138   POTASSIUM mmol/L 4.0   CHLORIDE mmol/L 104   CO2 mmol/L 22.6   BUN mg/dL 5*   CREATININE mg/dL 0.70   CALCIUM mg/dL 9.0   ALBUMIN g/dL 3.9   BILIRUBIN mg/dL 0.4   ALK PHOS U/L 83   ALT (SGPT) U/L 21   AST (SGOT) U/L 18   GLUCOSE mg/dL 129*             IMAGING:  NM PET/CT Skull Base to Mid Thigh (08/14/2024 09:17)     Assessment & Plan      ASSESSMENT:   1.  Metastatic rectal cancer.   Initial rectal ultrasound examination reported T3N0 disease without lymphadenopathy.   CT scan of chest, abdomen and pelvis reported no lymphadenopathy in the abdomen, pelvis or chest. She does have small pulmonary nodule 6-7 mm and 2 mm with indeterminate feature. There is no solid evidence of metastatic disease otherwise.   Based on the CT scan and rectal ultrasound imaging studies, this patient had stage IIA (T3N0M0) disease.   She had PET scan examination on 8/8/2019 which reported a hypermetabolic right upper lobe pulmonary nodule 6 mm with SUV 5.6.  This is suspicious for metastatic disease however it is too small to be  biopsied.  This patient may have stage IV disease.   She initiated concurrent radiation with continuous 5-FU on 8/12/2019.  Patient finished concurrent chemoradiation therapy.  Patient underwent surgical resection of the rectal tumor and diverting loop ileostomy on 12/2/2019 with Dr. Ye.  Pathology evaluation reported residual T3 disease, with 1 out of 14 lymph nodes positive for malignancy.  Certainly this patient has at least stage IIIb rectal cancer (T3N1M0?)  On 1/22/2020 adjuvant chemotherapy FOLFOX 6 regimen initiated.   On 2/5/2022 cycle #2 was delayed secondary to neutropenia.  She was given 3 days of Granix.   Emend added with cycle 3.  With improved nausea control  Continuing home Granix x3 days following 5-FU disconnect  3/11/2020 due for cycle 4, however, she is experiencing progressive abdominal pain and occasional fevers.   CT scan performed on 3/13/2020 reveals no evidence of progressive or recurrent disease.  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  Patient hospitalized 4/24-4/26/20 after cycle 5 of chemotherapy with acute UTI.  CT abdomen/pelvis noting fluid collection in the presacral region having diminished in size compared to CT dated 3/13/2020.  Patient was evaluated by Dr. Ye while in the hospital with further plans to evaluate possible colovaginal fistula following completion of chemotherapy.  Patient did respond to IV antibiotics and discharged home on oral cefdinir.  4/29/2020 cycle 6 of FOLFOX.  Urinary symptoms have resolved   Patient seen in the [facility] ED on 5/2/2020 for what was suspected to be an allergic reaction.  She called our service on Saturday explaining that she was experiencing hand and face swelling.  She was instructed to proceed to the emergency department at which time she was assessed and prescribed a Medrol Dosepak.  She had just completed her 5-FU and was unhooked on Friday, 5/1/2020.  Her symptoms have resolved in the office on assessment, 5/5/2020.  The  patient had grade 3 hand-foot syndrome based on symptomology.  Patient had cycle #8 of 5-FU on 5/13/2020. Due to her symptoms and poor tolerance to the 5-FU, her treatment dose will be reduced 50% for oxaliplatin and infusional 5-FU.  Bolus 5-FU will be discontinued..  On 5/21/2020 patient had a PET scan and it showed no evidence of of metastatic disease in the neck, chest, abdomen or pelvis.  There was fluid accumulation/abscess.   On 5/27/2020 discussed with the patient that we can discontinue chemotherapy at this time.  She will follow-up with Dr. Ye to discuss a possibility to reverse the ileostomy.  We likely will obtain anther PET scan in 3 to 4 months for reassessment.    Patient was seen by Dr. Ye on 6/19/2019 for evaluation and to discuss possible take down of her ileostomy.  She is scheduled to have a gastrografin enema on 7/2/2020 to evaluate for a fistula, and then a colonoscopy on 7/15/2020, both done by Dr. Ye.  She states that based on the above imaging and procedure findings, she may have a more extensive surgery done or just have her ileostomy reversed, which would likely be done in August 2020.  This will all be coordinated under the care of Dr. Ye.     CT scan from 09/09/2020 and also compared to her previous PET scan examination from 05/21/2020 and also the original PET scan from 08/09/2019.  The original PET scan there is a small right upper lobe pulmonary nodule 6 mm with SUV 5.6. So in the 05/2020 PET scan the nodule is still there but activity seems much less and this most recent CT scan examination also documented the preexisting small pulmonary nodule however there is a new left lower lobe pulmonary nodule 8 mm in size and I shared with the patient today this nodule is suspicious for metastatic disease. Laboratory studies reported minimal CEA level 1.32 on 09/09/2020. Liver function panel was only marginally abnormal with the ALT 45, the remaining is normal. However laboratory  study today showed worsening AST 55, ALT 95 and alkaline phosphatase 143 but is still normal, total bilirubin 0.3.   Recommended to have repeat PET scan examination for assessment because the left lower lobe pulmonary nodule is too small to be biopsied, and if the PET scan reports hypermetabolic lesion, I recommended to have stereotactic radiation therapy. Explained to patient that she is not a really good candidate to have thoracotomy for resection because she still has unhealed wound in the abdomen. Stereotactic radiation therapy will likely be a more feasible choice.   PET scan examination obtained on 9/18/2020 showed metastatic disease.  It reported a few hypermetabolic pulmonary nodules, and some new small pulmonary nodules, all of them highly suspicious for metastatic disease.  She also has hypermetabolic activity in the abdomen and pelvic area which could be related to scar tissue from her recent surgery versus metastatic disease.  Nevertheless overall picture fits with metastatic cancer.  Discussed with the patient on 9/20/2020, we reviewed the PET scan images together, and I recommended to have systematic chemotherapy, I do not think stereotactic reading therapy to the hypermetabolic lesions in the lungs warranted at this time because even those will be treated with reading therapy, she will still need systematic chemotherapy.  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  I did discuss with the patient using anti-EGFR monoclonal antibody versus anti-VEGF monoclonal antibody.     Palliative chemotherapy cycle#1 FOLFIRI was started on 10/13/2020.  No bolus 5-FU given considering previous poor tolerance.    NGS study from [facility] reported positive for K-s[place] mutation.  Microsatellite stable, mutation burden 5/Mb.   Discussed with the patient 10/27/2020, because of the K-s[place] mutation, she is not a candidate for anti-EGFR monoclonal antibody such as Erbitux or  vectibix.  She could be a candidate for anti-VEGF monoclonal antibody, however because of active wound, she is not a candidate right now at this moment.  Patient seen in the ED for acute right neck pain on 11/16/2020.  CT angiogram as well as CT of the cervical spine performed with no acute findings but notably one of her pulmonary nodules, left upper lobe, somewhat decreased in size.  Patient given prednisone pack.  Further details below.  Cycle #6 chemotherapy will be resumed on 1/5/2021.  Started back on original irinotecan.  PET scan examination obtained on 1/12/2021 after cycle #6 chemotherapy reported improved pulmonary nodule hypermetabolic activity.  Confirmed these are metastatic lesions.  Patient is evaluated 1/19/2020, will continue cycle #7 FOLFIRI chemotherapy.  However Avastin will be on hold see next section.   Patient was reevaluated on 2/2/2021, will continue chemotherapy cycle #8 FOLFIRI.  Avastin / biosimilar will be added.    She talked to [facility] specialist in mid February 2021, and had recommended palliative chemotherapy without surgical intervention of metastatic lung lesions.   On 3/2/2021, patient will proceed with cycle #10 FOLFIRI plus bevacizumab.  After 12 cycles, plan to start maintenance treatment.  3/16/2021 cycle 11.  Plus bevacizumab.  3/30/2021 cycle 12 FOLFIRI plus bevacizumab.  Having issues with worsening nausea despite premeds with dexamethasone, Aloxi, Emend, and Zofran at home.  Patient is requesting a dose of Phenergan, which is helped her in the past.  We will give this to her with treatment today.  PET scan examination on 4/6/2021 reported no evidence of hypermetabolic metastatic lesion.  Discussed with the patient on 4/13/2021, we reviewed the PET scan results.  We recommended to switch to maintenance chemotherapy without irinotecan.  We will continue 5-FU and Avastin every 2 weeks.  We discussed possibility of switching to oral Xeloda  treatment.  Discussed with the patient for side effects more so with hand-foot syndrome.  Patient is agreeable.   Xeloda 2000 mg twice daily 7 days on, 7 days off along with Avastin initiated 4/27/2021.  After only 5 doses of Xeloda significant hand-foot syndrome, Xeloda held. Patient requesting to be transitioned back to infusional 5-FU, as she felt that she tolerated infusional 5-FU without any problem.  The patient will receive 5-FU leucovorin and Avastin every 2 weeks.  Xeloda discontinued.  5/25/2021 continued cycle #4 maintenance 5-FU, leucovorin, Avastin tolerating this much better so far, we will continue this. Recommended to have 12 cycles of maintenance chemotherapy, then obtain PET scan for reevaluation.  We could discuss further treatment plan at that time.  9/14/2021 patient due for cycle 12 of additional maintenance 5-FU/leucovorin plus Avastin.  She continues to tolerate treatment well overall.  She is anxious in the office today with her Mediport not getting blood at first and also with upcoming scans being heavy on her mind.  She was instructed to take one of her Klonopin pills while here to help calm her mind.  Heart rate did improve from 140s down to 112.  Proceed with treatment today as scheduled.  PET scan examination on 9/24/2021 reported further shrinking of the right upper lobe tiny pulmonary nodule.  No other new lesions.  Discussed with patient on 9/24/2021 about further shrinking of the right upper lobe pulmonary nodule and no evidence for other new lesions. We recommended to have chemo break for 3 months with repeated CT scan for reevaluation.  Recent CT scan for chest 12/14/2021 and abdomen CT from 11/29/2021 reported no evidence for active disease. The right upper lobe pulmonary nodule, left lower lobe pulmonary nodule minimal about 4 mm and stable. I discussed with patient today on 12/21/2021, recommended to give her another 3 months chemotherapy holiday and obtain CT scan afterwards  for reevaluation. After discussion, patient is agreeable.   CT scan examination for chest abdomen and pelvis obtained on 3/15/2022 reported a slight increase of the left upper lobe pulmonary nodule 5 mm, from previous 3 mm.  The other pulmonary nodules were stable in size.  There is also small left upper lobe groundglass changes.   Dr. Nguyen reviewed images studies with the patient 3/23/2022, compared to multiple previous images including CT chest CT and PET scan, suspected disease progression.  Discussed with the patient, and I recommended to resume maintenance chemotherapy with 5-FU plus leucovorin plus Avastin repeating every 2 weeks, and obtaining CT scan for reassessment in 3 months.  Patient is agreeable.  Patient resumed maintenance chemotherapy on 3/29/2022 with 5-FU leucovorin and Avastin.   4/26/2022, proceed with cycle #27 maintenance 5-FU, leucovorin, and Avastin.  Patient continues to tolerate treatment well.    On 5/10/2022, we will proceed ahead with cycle #28 maintenance chemotherapy.  Plan to have CT scan after cycle #30.  5/24/2022 cycle 29 maintenance chemotherapy.  Continue to tolerate well.  6/7/2022 cycle #30 maintenance chemotherapy, continuing to tolerate well.   CT scan for chest abdomen pelvis with IV contrast obtained on 6/21/2022 reported sub-6 mm pulmonary nodules either stable or slightly smaller.  We reviewed the images with the patient together.  We discussed with the patient and recommended to hold chemotherapy for now with repeating CT scan in 3 months for reassessment.  CT scan of the chest abdomen pelvis 9/13/2022 reported stable condition, no evidence for recurrent or metastatic colon cancer.  On 1/10/2023 patient had a CT chest abdomen pelvis with reported no evidence for disease progression.  Laboratory study also showed normal CEA.   Patient had a CT scan for chest, abdomen, and pelvis obtained on 04/11/2023. This study reported very small pulmonary nodules, the right upper  lobe nodule is stable to 6 mm, however, the left upper lobe and the left lower lobe nodule increased to 5 mm from previous 4 mm. I discussed with the patient today on 04/19/2023, I recommend to obtain another CT scan in 3 months for reassessment. The nodules are so small, they likely will not be picked up by PET scan examination.  CT scan examination of the chest, abdomen, and pelvis obtained on 7/7/2023 reported stable small pulmonary nodules. No evidence for disease progression or new lesions in chest, abdomen, and pelvis.  Discussed with patient today on 7/14/2023, however, patient is concerning for disease progression. I recommended to obtain Guardant Reveal for circulating tumor DNA study. If the study is positive, we will further obtain PET scan examination, otherwise, we will obtain CT scan for chest, abdomen, and pelvis in 3 months for reassessment.  The patient had CT scan for the chest, abdomen, and pelvis obtained on 10/27/2023, which reported worsening pulmonary nodules at bilateral upper lobes. Laboratory studies showed low normal CEA level and normal liver function panel. The Guardant Reveal study is still pending. I discussed this with the patient on 11/03/2023 and we shared the images, and I recommended having a PET scan examination for further assessment. I will present her case at the multimodality lung conference. If those lesions are deemed to be metastatic lesions, I recommend having stereotactic radiotherapy. I discussed it with the patient, and she voiced understanding and was agreeable with that strategy.  I presented her case at the multimodality chest conference 11/16/2023.  The consensus was for patient to receive stereotactic radiation therapy for the right upper lobe lung lesion, and the bigger left upper lobe lesion, and monitor the smaller left upper lobe lesion with further images studies down the line. Also, the conference recommended Guardant Reveal study which we already ordered. I  discussed with the patient today on 11/17/2023, we explained to the patient that the opinion of the conference and she is agreeable with this and prefers this strategy because of limited toxicity compared to long term commitment to starting chemotherapy again. I recommend to obtain a CT scan for the chest, abdomen, and pelvis with both IV and oral contrast for reassessment in 3 months for reassessment of metastatic lung lesions and thickness in the pelvis where the PET scan reported a low activity SUV 2.4 in the surgical bed.   The patient had steroid-induced radiation therapy for the right upper lobe and one of the left upper lobe lesions.  Her CT scan examination on 02/02/2024 reported shrinking nodules of the right upper lobe and one of the left upper lobe lesions, both from 9 mm down to 6 mm. There are 2 stable pulmonary nodules 7 mm. Nothing new.  02/09/2024, I discussed with the patient and recommended CT scan for the chest, abdomen, and pelvis in 3 months for reassessment. Guardant Reveal study was 0% ctDNA. We will also continue laboratory studies every 3 months for Guardant Reveal, together with routine labs, CBC, CMP, and CEA.  CT scan of the chest, abdomen, and pelvis conducted on 4/23/2024 revealed stable multiple pulmonary nodules, with no other new pulmonary nodules or evidence of disease recurrence or abnormal pelvis. The patient's liver function panel was normal, and low-level CEA level was also noted. The Guardant Reveal study was able to be obtained earlier due to regulatory rules, and we hugo obtain today the Guardant reveal study, be available within 10 to 14 days.   Given the patient's metastatic liver lesion, and lung lesions from colon cancer, careful monitoring is necessary. A chest CT scan with IV contrast will be arranged in 3 months for reevaluation. The study will be reviewed again in 3 months, which will include CBC, CMP, and CEA level.   Imaging study with chest CT scan on 08/02/2024  reported multiple bilateral pulmonary nodules that are relatively stable. However, the Guardant Reveal reported positive ctDNA indicating disease progression. A subsequent PET scan examination on 08/14/2024 reported newly developed hypermetabolic pulmonary nodules. The highest SUV is 3.7, corresponding to an 8 mm new right upper lobe nodule, and there are several other hypometabolic nodules in the lungs.   8/21/2024 resumption of chemotherapy with 5-FU bevacizumab  Triage visit 8/28/2024 with intractable diarrhea that was unclear if this is secondary to chemotherapy or infectious etiology given her known chronic colitis, fever and abdominal pain.  Further management as outlined below  9/4/2024 per review today diarrhea has improved though she is having some difficulty with passage of gas and stool,?  Related to significant inflammation in the rectum versus tumor obstruction.  The passage of gas has improved in the last few days but she does still have to change positions on the toilet to get stool to pass without it being painful.  Discussed all of this with Dr. Nguyen and we will refer the patient urgently back to Dr. Ye for at the very least rectal examination in the office versus full repeated colonoscopy.  Because of the potential for examination or invasive procedures, we will hold bevacizumab today.  Because the patient had significant diarrhea last week and concerns that it may be related to chemotherapy we will decrease her 5-FU/leucovorin today by 30%.  If she does well we can go back to full dose with cycle 3.     9/18/2024 she presented for evaluation prior to cycle #3 of palliative chemotherapy with 5-FU, leucovorin, and bevacizumab. Given her recent biopsy on 09/11/2024, it is prudent to postpone the Avastin treatment today to ensure safety.  Chemotherapy will be proceeded.      She presented for evaluation on 10/02/2024, tolerating chemotherapy except for diarrhea. This has been managed with  octreotide. Proceed with cycle 4 chemotherapy today with 5-FU, leucovorin and resumption of bevacizumab.     2.  Intractable diarrhea due to chemotherapy.   Patient developed diarrhea on Saturday, 8/24/2024.  Is unclear if this is related to infection as she did have fevers at the time the diarrhea began or chemotherapy.  She does have chronic colitis noted on most recent PET scan, this therefore could be diverticulitis flare  GI panel and C. difficile negative  8/29/2024 she did receive a single dose of octreotide  Begin empiric Flagyl 500 mg 3 times daily x 10 days  8/30/2024 discussed negative stool studies.  We will add Levaquin to already prescribed Flagyl to complete management of diverticulitis.  It is unclear if the patient's symptoms are related to diverticular flare given her previous abdominal pain and fever versus chemotherapy.  However, her symptoms are currently improved  9/4/2024 diarrhea has resolved.  Stools are now more soft with some form but she is having difficulty with passage of stool, having to change positions on the toilet to avoid pain.  We are referring back to Dr. Ye as detailed above.  She will finish out the Flagyl and Levaquin as prescribed having roughly 3 days left of both.  We will also adjust doses of 5-FU/leucovorin today with concern for potential chemotherapy-induced diarrhea.  If she does well this cycle we can increase back to full dose with cycle 3 per Dr. Nguyen.  On 9/18/2024 patient reports that she experienced significant diarrhea during cycle #2 chemotherapy on 09/04/2024, leading to a 30% dose reduction. Despite taking Lomotil 2 tablets four times a day, Imodium, and Pepto-Bismol, her diarrhea persisted. She received octreotide shots twice, which improved her symptoms from watery to mushy stools but did not fully resolve the issue. She will continue with the current regimen and consider adding octreotide to her home regimen if diarrhea starts at home. The potential  side effects of octreotide, including nausea, were discussed.   10/1/2024 she reports using octreotide self-injection at home, which has significantly improved her diarrhea. Most of the time, she only requires it once a day and occasionally twice a day, depending on the severity of her diarrhea. She is satisfied with the results, which have improved her quality of life and ability to eat properly.   She will also use Lomotil and the Imodium as needed.      3.   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  Low-dose Lovenox discontinued and initiated Xarelto 20 mg daily.  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  Patient held Xarelto for 2 days prior to MediPort removal from the left chest wall and placement of new port in the right chest wall on 12/18/2020.  She resumed Xarelto that evening.  Progressive swelling in the neck, face, upper extremities prompting hospitalization with CT scan showing thrombosis in the right subclavian vein and SVC.  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  Although she had been off of Xarelto for a few days, clearly Xarelto was not prohibiting progression of her thrombosis after she resumed treatment and there was some evidence to suggest thrombosis had been present at least in the innominate vein on prior scan in September but now appears chronic.  Current acute thrombosis involving SVC and right  subclavian.  Patient was admitted and placed on heparin drip  Patient was evaluated by vascular surgery and intervention was not recommended for thrombolysis/thrombectomy.  On 12/22/2020, the patient developed worsening shortness of breath, increasing upper extremity edema consistent with worsening SVC syndrome.  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  Symptoms improved and patient was discharged 12/24/2020 on Lovenox 100 mg every 12 hours.    Returns 12/29/2020 for evaluation continuing Lovenox, overall tolerating it well.  No bleeding issues.  Improved edema 1/5/2021.  Will continue Lovenox weight-based every 12 hours.  On 1/19/2021 and 2/2/2021, patient reports improved facial swelling.  She however does have being bruise on her abdomen wall where she does Lovenox injection.  However she does not have a spontaneous epistaxis, gum bleeding or other bleeding.   On 2/2/2021, we discussed that side effects of Avastin/biosimilar related to thrombosis.  Since this patient already has been on Lovenox, I think the benefits from treatment for her cancer will outweigh that risk of thrombosis, will proceed ahead with Avastin.  I cautioned patient to watch out for signs of worsening thrombosis patient voiced understanding.   5/11/2021 Lovenox dosing will be adjusted due to patient's weight loss.  We discussed she needs 1 mg/kg.  Her syringes are 100 mg syringes she will do 0.9 mL subcu every 12 hours.  8/3/2021 Patient continues to have bruising on abdomen from lovenox injections.  Will need to monitor weight and adjust dosing in future if further weight loss.   Stable condition on 9/28/2021.  Continue Lovenox anticoagulation.    Patient reports no chest pain no dyspnea no leg edema. However she had bruising and also most recently abnormal small abscess for which she actually went to ER on 11/29/2021, had  I&D. The wound is healing. No further drainage. Denies fever sweating or chills. After discussion, she will continue Lovenox injection for now.   On 3/23/2022, patient reports good tolerance of Lovenox.  Nevertheless she still has small quantity of pus in the left lower abdomen abscess, she squeezes it every day to prevent it became worse.  She reports no fever sweating chills.  Recommended to start Augmentin 875 mg twice a day for 7 days.  She will continue Lovenox indefinitely.  On 4/12/2022, patient reports resolution of the small abscess at left lower abdominal wall.   On 5/10/2022, patient continues on Lovenox indefinitely.  No easy bleeding or bruising.  6/23/2022 continuing on Lovenox 1 mg/kg at a dose of 100 mg (patient weight is 96.6 kg today) every 12 hours.  On 1/17/2023, patient reports good tolerance, Lovenox will be continued.    Patient had a venous Doppler study on 02/05/2023, which reported acute left upper extremity DVT in the subclavian vein, axillary and the brachial vein, and also superficial thrombophlebitis in the basilic upper arm.   On 04/19/2023, patient reports that she was not compliant with anticoagulation at the time of recurrent DVT. She now is fully compliant and is using Lovenox.  She continues on anticoagulation.  She has only occasional blood with wiping which is related to her frequent diarrhea      4.  Mild anemia.   She also has history of iron deficiency.  Iron studies reveal iron saturation of 10%.  She was started on oral iron but was unable to tolerate due to GI side effects.   Status post Injectafer 2/5/2020 and 2/12/2020   Improved hemoglobin 13.5 on 1/5/2021.  3/16/2020 when hemoglobin now up to 16.2, hematocrit 47.6.  Patient admits that she has started smoking again, and I have encouraged her to quit.  She denies any snoring or sleep apnea diagnosis.  Patient is not taking oral iron.  We will closely monitor.  3/30/2020 hemoglobin 15.7, HCT 47.5.  Patient states that  she has cut back significantly on her smoking, although not quit yet.  I have encouraged her to continue working on this.  We will continue to closely monitor.  Hemoglobin 14.6 on 6/8/2021 at the meantime he has worsening macrocytosis .6.    Laboratory studies 6/22/2021 reported excellent folate > 20 ng/mL, however low normal B12 level 357 pg/mL.    On 7/6/2021, normal hemoglobin 15.8, .9 and HCT 47.2%.  Patient was asked to start oral vitamin B12 at 1000 mcg daily.   9/14/2021 hemoglobin 17.0, hematocrit 52.1.  Monitor.  On 9/28/2021 hemoglobin 16.1 hematocrit 48.5%, continue to monitor.   Lab study on 12/14/2021 reported excellent ferritin 296 and iron saturation 25% with free iron 104 TIBC 424. Hemoglobin was 15.2 with .2.   Normal hemoglobin 14.6 on 3/15/2022.  Iron study reported normal ferritin 129.5 ng/mL however slightly low iron saturation 11%.  Continue to monitor for now.  9/4/2024 hemoglobin is normal at 14.0  9/18/2024 hemoglobin 14.9.  10/2/2024 hemoglobin 13.7.    5.  Peripheral neuropathy secondary to chemotherapy.   This is mild involving bilateral hands and feet as reported on 9/16/2020.   Will avoid chemotherapy with oxaliplatin.  Stable mild neuropathy as of 11/10/2020  Patient reports worsening peripheral neuropathy and cycle #5 chemotherapy on 12/8/2020 with and without irinotecan.   On 1/5/2021, patient reports improvement of peripheral neuropathy, will add irinotecan back to chemotherapy.  Continue to monitor and adjust medication.  Stable.      6.  Depression.  Patient seen by JULIET Davenport on 11/9/2020.  She was started on mirtazapine.  Lexapro was discontinued.  This has definitely improved her mood with the patient feeling overall much better.  She is sleeping better.  Appetite is improved with her actually eating more than she wants to.    Condition is stable.  She plans to continue follow-up with supportive oncology.    7. Malfunction of the portal  catheter  Patient reports there was no blood drawn from the portal catheter. CT scan of the chest on 7/7/2023 reported occlusion of the SVC around to the Port-A-Cath tubing. I recommend trying activase to see if it helps.  Otherwise, we may have to remove the catheter. Clinically, patient has no signs of SVC syndrome.  On 11/03/2023, the patient reports that her Port-A-Cath was able to be used for a CT scan for the injection of intravenous dye; however, there was no blood return, so we needed to draw from her arm for the blood test. We will continue to keep Port-A-Cath for now.  On 02/09/2024, the patient reports that the port was able to be flushed. However, no blood was returned. We will continue to flush every 6 weeks.  9/4/2024 she continues to have no blood return from her port but can taste saline as we flush at each time and is functioning well otherwise.  Monitor.  10/2/2024 no specific problem.      8. Internal hemorrhoids.  She has large internal hemorrhoids diagnosed during a flexible sigmoidoscopy on 09/11/2024. Hydrocortisone cream was prescribed for treatment.       9.  This patient also has strong family history for malignancy   The patient had appointment with genetic counseling on September 3, 2019.  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    10. Hypertension.  Continue management of hypertension and follow up with PCP.      PLAN:    Proceed with cycle 4 chemotherapy, with the 5-FU and leucovorin at the same 30% dose reduction.  And resumption of bevacizumab.   Continue octreotide injection 100 mcg every 8 hours as needed up to 3 times a day.  Continue Imodium and Lomotil as needed.  She will return in 2 days to discontinue the 5-FU infusion.   She will return every 2 weeks for evaluation with laboratory studies per protocol and chemotherapy. She will see NP in 2 weeks and 4 weeks, and I will see her in 6 weeks.   Continue Protonix 40 mg daily.   Continue Gas-X.   Continue Magic barrier cream  rectum as needed   Continue anticoagulation with Lovenox subcu injection every 12 hours.   A liquid biopsy/Guardant reveal study will be conducted in the end of October to monitor her response together with intermittent imaging studies.      Patient continues on high risk medication requiring close monitoring for toxicity    I spent 42 minutes caring for Carole on this date of service. This time includes time spent by me in the following activities: preparing for the visit, reviewing tests, performing a medically appropriate examination and/or evaluation, counseling and educating the patient/family/caregiver, referring and communicating with other health care professionals, documenting information in the medical record, independently interpreting results and communicating that information with the patient/family/caregiver, care coordination, ordering medications, obtaining a separately obtained history, and reviewing a separately obtained history      Dong Nguyen MD PhD  10/02/2024        CC:  Deepika Vela III NP-C   MD Alverto Bowers MD

## 2024-10-02 NOTE — NURSING NOTE
Port negative for blood return.  Flushing well and read last dye study.  Able to use for treatment  per Dr. Nguyen notes 9/18/24- continue to monitor.  Labs drawn via venipuncture.

## 2024-10-04 ENCOUNTER — INFUSION (OUTPATIENT)
Dept: ONCOLOGY | Facility: HOSPITAL | Age: 44
End: 2024-10-04
Payer: COMMERCIAL

## 2024-10-04 DIAGNOSIS — C78.00 MALIGNANT NEOPLASM METASTATIC TO LUNG, UNSPECIFIED LATERALITY: ICD-10-CM

## 2024-10-04 DIAGNOSIS — Z79.899 ENCOUNTER FOR LONG-TERM (CURRENT) USE OF HIGH-RISK MEDICATION: Primary | ICD-10-CM

## 2024-10-04 DIAGNOSIS — C20 ADENOCARCINOMA OF RECTUM: ICD-10-CM

## 2024-10-04 DIAGNOSIS — Z45.2 ENCOUNTER FOR FITTING AND ADJUSTMENT OF VASCULAR CATHETER: ICD-10-CM

## 2024-10-04 PROCEDURE — 25010000002 HEPARIN LOCK FLUSH PER 10 UNITS: Performed by: INTERNAL MEDICINE

## 2024-10-04 RX ORDER — HEPARIN SODIUM (PORCINE) LOCK FLUSH IV SOLN 100 UNIT/ML 100 UNIT/ML
500 SOLUTION INTRAVENOUS AS NEEDED
Status: DISCONTINUED | OUTPATIENT
Start: 2024-10-04 | End: 2024-10-04 | Stop reason: HOSPADM

## 2024-10-04 RX ORDER — HEPARIN SODIUM (PORCINE) LOCK FLUSH IV SOLN 100 UNIT/ML 100 UNIT/ML
500 SOLUTION INTRAVENOUS AS NEEDED
OUTPATIENT
Start: 2024-10-04

## 2024-10-04 RX ORDER — SODIUM CHLORIDE 0.9 % (FLUSH) 0.9 %
10 SYRINGE (ML) INJECTION AS NEEDED
Status: DISCONTINUED | OUTPATIENT
Start: 2024-10-04 | End: 2024-10-04 | Stop reason: HOSPADM

## 2024-10-04 RX ORDER — SODIUM CHLORIDE 0.9 % (FLUSH) 0.9 %
10 SYRINGE (ML) INJECTION AS NEEDED
OUTPATIENT
Start: 2024-10-04

## 2024-10-04 RX ADMIN — Medication 500 UNITS: at 11:09

## 2024-10-04 RX ADMIN — Medication 10 ML: at 11:05

## 2024-10-08 RX ORDER — ROSUVASTATIN CALCIUM 5 MG/1
5 TABLET, COATED ORAL DAILY
Qty: 90 TABLET | Refills: 0 | Status: SHIPPED | OUTPATIENT
Start: 2024-10-08

## 2024-10-08 NOTE — TELEPHONE ENCOUNTER
Refill Protocol Failed, Requesting Rosuvastatin 5mg QD    LOV w/ you 7/8/24  FU w/ Dr. Bustos 7/16/25  Last Labs- 4/13/21    Please review and sign.    YOANDY Mayer

## 2024-10-16 ENCOUNTER — INFUSION (OUTPATIENT)
Dept: ONCOLOGY | Facility: HOSPITAL | Age: 44
End: 2024-10-16
Payer: COMMERCIAL

## 2024-10-16 ENCOUNTER — LAB (OUTPATIENT)
Dept: LAB | Facility: HOSPITAL | Age: 44
End: 2024-10-16
Payer: COMMERCIAL

## 2024-10-16 ENCOUNTER — OFFICE VISIT (OUTPATIENT)
Dept: ONCOLOGY | Facility: CLINIC | Age: 44
End: 2024-10-16
Payer: COMMERCIAL

## 2024-10-16 VITALS
RESPIRATION RATE: 16 BRPM | BODY MASS INDEX: 31.52 KG/M2 | HEART RATE: 64 BPM | HEIGHT: 66 IN | WEIGHT: 196.1 LBS | SYSTOLIC BLOOD PRESSURE: 109 MMHG | TEMPERATURE: 97.8 F | OXYGEN SATURATION: 97 % | DIASTOLIC BLOOD PRESSURE: 76 MMHG

## 2024-10-16 DIAGNOSIS — C20 ADENOCARCINOMA OF RECTUM: ICD-10-CM

## 2024-10-16 DIAGNOSIS — Z79.899 ENCOUNTER FOR LONG-TERM (CURRENT) USE OF HIGH-RISK MEDICATION: ICD-10-CM

## 2024-10-16 DIAGNOSIS — Z79.899 HIGH RISK MEDICATION USE: ICD-10-CM

## 2024-10-16 DIAGNOSIS — C20 ADENOCARCINOMA OF RECTUM: Primary | ICD-10-CM

## 2024-10-16 DIAGNOSIS — C78.00 MALIGNANT NEOPLASM METASTATIC TO LUNG, UNSPECIFIED LATERALITY: ICD-10-CM

## 2024-10-16 DIAGNOSIS — Z79.01 CHRONIC ANTICOAGULATION: ICD-10-CM

## 2024-10-16 DIAGNOSIS — G89.3 CANCER ASSOCIATED PAIN: Primary | ICD-10-CM

## 2024-10-16 DIAGNOSIS — D68.51 HETEROZYGOUS FACTOR V LEIDEN MUTATION: ICD-10-CM

## 2024-10-16 DIAGNOSIS — Z79.899 ENCOUNTER FOR LONG-TERM (CURRENT) USE OF HIGH-RISK MEDICATION: Primary | ICD-10-CM

## 2024-10-16 DIAGNOSIS — T45.1X5A CHEMOTHERAPY INDUCED DIARRHEA: ICD-10-CM

## 2024-10-16 DIAGNOSIS — K52.1 CHEMOTHERAPY INDUCED DIARRHEA: ICD-10-CM

## 2024-10-16 LAB
ALBUMIN SERPL-MCNC: 3.5 G/DL (ref 3.5–5.2)
ALBUMIN/GLOB SERPL: 1.3 G/DL
ALP SERPL-CCNC: 73 U/L (ref 39–117)
ALT SERPL W P-5'-P-CCNC: 14 U/L (ref 1–33)
ANION GAP SERPL CALCULATED.3IONS-SCNC: 10.2 MMOL/L (ref 5–15)
AST SERPL-CCNC: 13 U/L (ref 1–32)
BASOPHILS # BLD AUTO: 0.02 10*3/MM3 (ref 0–0.2)
BASOPHILS NFR BLD AUTO: 0.4 % (ref 0–1.5)
BILIRUB SERPL-MCNC: <0.2 MG/DL (ref 0–1.2)
BUN SERPL-MCNC: 8 MG/DL (ref 6–20)
BUN/CREAT SERPL: 12.3 (ref 7–25)
CALCIUM SPEC-SCNC: 8.6 MG/DL (ref 8.6–10.5)
CHLORIDE SERPL-SCNC: 106 MMOL/L (ref 98–107)
CO2 SERPL-SCNC: 26.8 MMOL/L (ref 22–29)
CREAT SERPL-MCNC: 0.65 MG/DL (ref 0.57–1)
DEPRECATED RDW RBC AUTO: 57.9 FL (ref 37–54)
EGFRCR SERPLBLD CKD-EPI 2021: 110.8 ML/MIN/1.73
EOSINOPHIL # BLD AUTO: 0.15 10*3/MM3 (ref 0–0.4)
EOSINOPHIL NFR BLD AUTO: 3 % (ref 0.3–6.2)
ERYTHROCYTE [DISTWIDTH] IN BLOOD BY AUTOMATED COUNT: 16 % (ref 12.3–15.4)
GLOBULIN UR ELPH-MCNC: 2.8 GM/DL
GLUCOSE SERPL-MCNC: 110 MG/DL (ref 65–99)
HCT VFR BLD AUTO: 43.3 % (ref 34–46.6)
HGB BLD-MCNC: 14 G/DL (ref 12–15.9)
IMM GRANULOCYTES # BLD AUTO: 0.01 10*3/MM3 (ref 0–0.05)
IMM GRANULOCYTES NFR BLD AUTO: 0.2 % (ref 0–0.5)
LYMPHOCYTES # BLD AUTO: 1.28 10*3/MM3 (ref 0.7–3.1)
LYMPHOCYTES NFR BLD AUTO: 25.3 % (ref 19.6–45.3)
MCH RBC QN AUTO: 32.1 PG (ref 26.6–33)
MCHC RBC AUTO-ENTMCNC: 32.3 G/DL (ref 31.5–35.7)
MCV RBC AUTO: 99.3 FL (ref 79–97)
MONOCYTES # BLD AUTO: 0.47 10*3/MM3 (ref 0.1–0.9)
MONOCYTES NFR BLD AUTO: 9.3 % (ref 5–12)
NEUTROPHILS NFR BLD AUTO: 3.13 10*3/MM3 (ref 1.7–7)
NEUTROPHILS NFR BLD AUTO: 61.8 % (ref 42.7–76)
NRBC BLD AUTO-RTO: 0 /100 WBC (ref 0–0.2)
PLATELET # BLD AUTO: 143 10*3/MM3 (ref 140–450)
PMV BLD AUTO: 9.3 FL (ref 6–12)
POTASSIUM SERPL-SCNC: 4.1 MMOL/L (ref 3.5–5.2)
PROT SERPL-MCNC: 6.3 G/DL (ref 6–8.5)
RBC # BLD AUTO: 4.36 10*6/MM3 (ref 3.77–5.28)
SODIUM SERPL-SCNC: 143 MMOL/L (ref 136–145)
WBC NRBC COR # BLD AUTO: 5.06 10*3/MM3 (ref 3.4–10.8)

## 2024-10-16 PROCEDURE — 85025 COMPLETE CBC W/AUTO DIFF WBC: CPT

## 2024-10-16 PROCEDURE — 25810000003 SODIUM CHLORIDE 0.9 % SOLUTION: Performed by: NURSE PRACTITIONER

## 2024-10-16 PROCEDURE — 96367 TX/PROPH/DG ADDL SEQ IV INF: CPT

## 2024-10-16 PROCEDURE — 80053 COMPREHEN METABOLIC PANEL: CPT

## 2024-10-16 PROCEDURE — 25010000002 FLUOROURACIL PER 500 MG: Performed by: NURSE PRACTITIONER

## 2024-10-16 PROCEDURE — 25010000002 FOSAPREPITANT PER 1 MG: Performed by: NURSE PRACTITIONER

## 2024-10-16 PROCEDURE — 25810000003 SODIUM CHLORIDE 0.9 % SOLUTION 250 ML FLEX CONT: Performed by: NURSE PRACTITIONER

## 2024-10-16 PROCEDURE — G0498 CHEMO EXTEND IV INFUS W/PUMP: HCPCS

## 2024-10-16 PROCEDURE — 96365 THER/PROPH/DIAG IV INF INIT: CPT

## 2024-10-16 PROCEDURE — 25010000002 PALONOSETRON PER 25 MCG: Performed by: NURSE PRACTITIONER

## 2024-10-16 PROCEDURE — 25010000002 LEUCOVORIN CALCIUM PER 50 MG: Performed by: NURSE PRACTITIONER

## 2024-10-16 PROCEDURE — 96375 TX/PRO/DX INJ NEW DRUG ADDON: CPT

## 2024-10-16 PROCEDURE — 25010000002 DEXAMETHASONE SODIUM PHOSPHATE 100 MG/10ML SOLUTION: Performed by: NURSE PRACTITIONER

## 2024-10-16 RX ORDER — PALONOSETRON 0.05 MG/ML
0.25 INJECTION, SOLUTION INTRAVENOUS ONCE
Status: COMPLETED | OUTPATIENT
Start: 2024-10-16 | End: 2024-10-16

## 2024-10-16 RX ORDER — SODIUM CHLORIDE 9 MG/ML
20 INJECTION, SOLUTION INTRAVENOUS ONCE
Status: COMPLETED | OUTPATIENT
Start: 2024-10-16 | End: 2024-10-16

## 2024-10-16 RX ORDER — PALONOSETRON 0.05 MG/ML
0.25 INJECTION, SOLUTION INTRAVENOUS ONCE
Status: CANCELLED | OUTPATIENT
Start: 2024-10-16

## 2024-10-16 RX ORDER — SODIUM CHLORIDE 9 MG/ML
20 INJECTION, SOLUTION INTRAVENOUS ONCE
Status: CANCELLED | OUTPATIENT
Start: 2024-10-16

## 2024-10-16 RX ORDER — HYDROCODONE BITARTRATE AND ACETAMINOPHEN 5; 325 MG/1; MG/1
1 TABLET ORAL EVERY 6 HOURS PRN
Qty: 45 TABLET | Refills: 0 | Status: SHIPPED | OUTPATIENT
Start: 2024-10-16

## 2024-10-16 RX ADMIN — FLUOROURACIL 2400 MG: 50 INJECTION, SOLUTION INTRAVENOUS at 12:24

## 2024-10-16 RX ADMIN — LEUCOVORIN CALCIUM 560 MG: 350 INJECTION, POWDER, LYOPHILIZED, FOR SUSPENSION INTRAMUSCULAR; INTRAVENOUS at 11:49

## 2024-10-16 RX ADMIN — SODIUM CHLORIDE 20 ML/HR: 9 INJECTION, SOLUTION INTRAVENOUS at 10:40

## 2024-10-16 RX ADMIN — DEXAMETHASONE SODIUM PHOSPHATE 12 MG: 10 INJECTION, SOLUTION INTRAMUSCULAR; INTRAVENOUS at 10:41

## 2024-10-16 RX ADMIN — FOSAPREPITANT 100 ML: 150 INJECTION, POWDER, LYOPHILIZED, FOR SOLUTION INTRAVENOUS at 11:04

## 2024-10-16 RX ADMIN — PALONOSETRON HYDROCHLORIDE 0.25 MG: 0.25 INJECTION INTRAVENOUS at 10:41

## 2024-10-16 NOTE — NURSING NOTE
Patient arrived to infusion from clinic for treatment. Avastin is held today. Fluorouracil dose was decreased today by 50% of original dose due to patient experiencing diarrhea.

## 2024-10-16 NOTE — PROGRESS NOTES
REASON FOR FOLLOW UP:     Rectal cancer, rectal ultrasound examination in July 2019 reported T3N0 disease without lymphadenopathy. She does have small pulmonary nodule 6-7 mm and 2 mm with indeterminate feature. There is no solid evidence of metastatic disease otherwise. Patient has stage IIA (T3N0M0) disease.  The patient is heterozygous factor V Leiden, was on prophylactic anticoagulation with Lovenox 40 mg daily given her increased risk of thrombosis with MediPort and GI malignancy.   PET scan on 8/8/2019 reported an 8 mm hypermetabolic right upper lobe pulmonary nodule, which is suspicious for metastatic as well.    Patient had a PowerPort placement on the left upper chest by Dr. Joseph on 8/9/2019.  Patient was started on concurrent infusional 5-FU chemoradiation therapy on 8/12/2019, with planned complete surgical resection and further adjuvant chemotherapy with intention to cure the disease.   Patient underwent surgical resection of the primary rectal cancer by Dr. Ye on 12/2/2019.  Pathology evaluation reported residual T3N1 disease stage IIIb.  Diarrhea related to therapy and radiation.   Patient was started cycle 1 day 1 of adjuvant FOLFOX 6 on 1/23/2020.  On 2/5/2020 FOLFOX 6 cycle 2 delayed secondary to neutropenia.  Patient was given 3 days of Granix injection.  After cycle #2 we planned 3 days of Granix with each cycle.   Patient also intolerant of oral iron.  Patient received 2 doses of IV injectafer on 02/05/2020 and 02/12/2020.   02/12/2020 Proceed with cycle #2 of FOLFOX 6 with 3 days of Granix.  On 3/11/2020 cycle 4 postponed secondary to abdominal pain and occasional low-grade fevers.  CT scans ordered.  Cycle #4 resumed after CT scan revealed no evidence of disease.  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 Ye. patient has no fever.  Continue to observe.   Grade 3 hand-foot syndrome secondary to 5-FU after cycle #7  FOLFOX 6 chemotherapy, prompting ER visit.  Also has worsening peripheral neuropathy.   Cycle #8 FOLFOX 6 was given on 5/13/2020, with 50% dose reduction for both 5-FU and oxaliplatin, and also examination of bolus 5-FU.   PET scan examination on 5/21/2020 reported no evidence of metastatic disease in the chest abdomen pelvis.  Stable 6 mm RUL pulmonary nodule.  On 5/27/2020, I discussed with the patient that we can discontinue chemotherapy at this time.   Patient had a surgical procedure for low anterior colon resection, coloanal anastomosis on 8/3/2020.  CT scan for chest abdomen pelvis on 9/9/2020 reported a new 8 mm noncalcified pulmonary nodule in the left lower lobe of the lung.  Otherwise stable right upper lobe 6 mm pulmonary nodule, and no evidence of disease recurrence in the abdomen or pelvis.  PET scan examination on 9/18/2020 reported multiple hypermetabolic small pulmonary nodules/ new pulmonary nodules.   Patient was started on pelvic chemotherapy with FOLFIRI regimen on 10/13/2020.   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 It reported tumor mutation burden 5 Muts/Mb, microsatellite stable, TP53 mutation [id number], and others.   Cycle #5 was without irinotecan, due to peripheral neuropathy.  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  Patient was switched to weight-based Lovenox injection.  Cycle #6 5-FU and irinotecan was delayed by 2 weeks because of the above incident.  Patient had a telemedicine evaluation at that the [facility] [place] in February 2021.  They agreed with our treatment plan for palliative chemotherapy followed by maintenance chemotherapy.    PET scan examination on 4/6/2021 after cycle #12 palliative chemotherapy reported no evidence of hypermetabolic metastatic lesion.   Patient was started on maintenance chemotherapy with 5-FU and  Avastin on 4/13/2021. (Unable to tolerate Xeloda because of a significant hand-foot syndrome).   PET scan on 9/24/2021 obtained after cycle #12 maintenance chemotherapy with 5-FU, leucovorin, Avastin reported no evidence for active disease. We recommended 3 months chemotherapy holiday.  CT scan examination on 3/15/2022 reported slightly disease progression with increase in size of small pulmonary nodule.  We started patient back on maintenance chemotherapy on 3/29/2022 treatment with 5-FU plus leucovorin and Avastin, repeating every 2 weeks.  After 6 more maintenance chemotherapy, CT scan for chest abdomen pelvis was done on 6/21/2022 which reported stable sub-6 mm pulmonary nodules.  Patient had last maintenance chemotherapy on 6/7/2022.   Disease progression, patient was restarted back on chemotherapy with 5-FU leucovorin and bevacizumab 8/21/2024      HISTORY OF PRESENT ILLNESS:  The patient is a 44 y.o. female with the above-mentioned history, who is seen back today for evaluation.    The patient returns the office today, 11/16/2024 anticipation of 5-FU, leucovorin and bevacizumab.  Please see Knotch message regarding ongoing toxicity to chemotherapy.  Unfortunately, the patient is quite tearful in the office today questioning if she can continue this regimen.  She is questioning the potential benefit of receiving a colostomy due to her significant diarrhea, burning, rectal pain and skin irritation.  Prior to today, this has been reviewed with Dr. Ford with recommendations to hold bevacizumab as surgery could be a potential in the future.  Additional dose adjustments to chemotherapy have been made.    The patient reports she does utilize octreotide 3 times daily for 5 days after chemotherapy which are her worst days.  This provides minimal benefit.  After her 5 days of significant diarrhea she utilizes only a single dose of octreotide daily.  She questions potential narcotic for ongoing rectal pain and burning  with bowel movements.    Past Medical History:   Diagnosis Date    Abdominopelvic abscess 08/12/2020    ADMITTED TO [facility]    Abnormal Pap smear of cervix 02/02/1998    JULIUS I    Abscess of abdominal wall 11/28/2012    SEEN AT [facility] ER    Anemia in pregnancy     Anxiety     Bilateral epiphora 04/2020    Bilateral hand swelling 05/02/2020    SEEN AT [facility] ER    Cervical lymphadenitis 06/06/2012    SEEN AT [facility] ER    Chemotherapy induced diarrhea 01/2021    Chemotherapy induced neutropenia 04/2020    Chemotherapy-induced nausea 02/2020    Chemotherapy-induced thrombocytopenia 05/2020    Chronic anticoagulation     Chronic diarrhea     Colon polyps     FOLLOWED BY DR. 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 ALEXANDRU HUNT    History of gestational diabetes     History of pre-eclampsia 1998    History of pre-eclampsia     History of radiation therapy 2019    FOLLOWED BY DR. JAVON LEWIS    History of TB skin testing 01/17/2009    TB Skin Test    History of TB skin testing 01/17/2009    TB Skin Test    History of transfusion 2019    12/2019    HPV in female 1998    Hypokalemia 09/2019    Hypomagnesemia 09/2019    Hyponatremia 06/2021    IBS (irritable bowel syndrome)     Ileostomy present 04/25/2020    Take down on 11/3/2022    Infected insect bite of neck 05/27/2016    SEEN AT [facility]    Kidney stones 08/09/2007    SEEN AT [facility] ER    Low anterior  resection syndrome 2022    FOLLOWED BY DR. PADMAJA ESPARZA    Lump of right breast     SWOLLEN LYMPH NODE    Lung cancer 2020    METASTATIC LUNG CANCER    Macrocytosis 2021    Monopolar depression     On anticoagulant therapy     Pain associated with surgical procedure 2020    Palmar-plantar erythrodysesthesia 2021    Perirectal abscess 2020    Pulmonary embolism without acute cor pulmonale 09/20/2019    X 3; ADMITTED TO [facility]    Pulmonary nodule, right 2020    Rectal cancer 2019    STAGE IIA, INVASIVE MODERATELY DIFFERENTIATED ADENOCARCINOMA, CHEMO AND XRT FINISHED 2019    Right shoulder pain 2020    SEEN AT [facility] ER    Right ureteral stone 10/01/2019    SEEN AT [facility] ER    SAB (spontaneous ) 1996     A2-1 INDUCED    Sciatica     Sepsis due to cellulitis 2002    LEFT GREAT TOE, ADMITTED TO [facility]    Tachycardia 2020    Thrombosis of superior vena cava 2020    AND BRACHIOCEPHALIC VEIN, ADMITTED TO [facility]    Urinary urgency 2020   Patient had COVID-19 infection diagnosed on 2022 despite was fully vaccinated and boosted.  She recovered without problem.      Past Surgical History:   Procedure Laterality Date    ABDOMINAL SURGERY      CHOLECYSTECTOMY N/A 10/10/2003    LAPAROSCOPIC WITH CHOLANGIOGRAM, DR. JAMEY TALAVERA AT [facility]    COLON RESECTION N/A 2019    Procedure: laparoscopic low anterior colon resection with mobilization of splenic flexure and diverting loop ileostomy: ERAS;  Surgeon: Padmaja Esparza MD;  Location: [facility] OR;  Service: General    COLON RESECTION N/A 2020    Procedure: LOW ANTERIOR COLON RESECTION, COLOANAL ANASTOMOSIS, MOBILIZATION SPLENIC FLEXURE;  Surgeon: Padmaja Esparza MD;  Location: [facility] OR;  Service: General;  Laterality: N/A;    COLONOSCOPY N/A 07/15/2020    PATENT ANASTAMOSIS IN MID RECTUM, RESCOPE IN 1 YR, DR. PADMAJA ESPARZA AT [facility]    COLONOSCOPY N/A 2022    DIFFUSE AREA OF MODERATELY  FRIABLE MUCOSA IN ENTIRE COLON , DIVERSION COLITIS, PATENT AND HEALTHY ANASTAMOSIS, DR. PADMAJA ESPARZA AT [facility]    COLONOSCOPY N/A 12/04/2023    6 MM SESSILE SERRATED ADENOMA POLYP IN DESCENDING, PATENT ANASTAMOSIS IN DISTAL RECTUM, RESCOPE IN 2 YRS, DR. PADMAJA ESPARZA AT [facility]    COLONOSCOPY W/ POLYPECTOMY N/A 07/08/2019    15 MM TUBULOVILLOUS ADENOMA POLYP IN HEPATIC FLEXURE, 20 MMTUBULOVILLOUS ADENOMA WITH HIGH GRADE DYSPLASIA IN RECTOSIGMOID, 4 CM MASS IN MID RECTUM, PATH: INVASIVE MODERATELY DIFFERENTIATED ADENOCARCINOMA, DR. JENNIFER LI AT [facility]    DILATATION AND EVACUATION N/A 2009    ENDOSCOPY N/A 07/08/2019    LA GRADE A ESOPHAGITIS, GASTRITIS, ALL BIOPSIES BENIGN, DR. JENNIFER LI AT [facility]    ILEOSTOMY CLOSURE N/A 11/14/2022    Procedure: ILEOSTOMY TAKEDOWN;  Surgeon: Padmaja Esparza MD;  Location: [facility] OR;  Service: General;  Laterality: N/A;    INCISION AND DRAINAGE PERIRECTAL ABSCESS N/A 08/14/2020    Procedure: INCISION AND DRAINAGE OF retrorectal dehiscence abcess with drain placement and irrigation;  Surgeon: Padmaja Esparza MD;  Location: [facility] OR;  Service: General;  Laterality: N/A;    PAP SMEAR N/A 01/24/2014    SIGMOIDOSCOPY N/A 07/24/2019    INFILTRATIVE PARTIALLY OBSTRUCTING LARGE RECTAL CANCER, AREA TATOOED, DR. PADMAJA ESPARZA AT [facility]    SIGMOIDOSCOPY N/A 11/23/2019    AN ULCERATED PARTIALLY OBSTRUCTING MASS IN MID RECTUM, AREA TATTOOED, DR. PADMAJA ESPARZA AT [facility]    SIGMOIDOSCOPY N/A 07/22/2021    PATENT ANASTAMOSIS IN DISTAL RECTUM, RESCOPE IN 1 YR, DR. PADMAJA ESPARZA AT [facility]    SIGMOIDOSCOPY N/A 09/11/2024    PATCHY INFLAMMATION AND EDEMA IN DESCENDING, AREA BIOPSIED AND WAS BENIGN, PATENT AND HEALTHY ANASTAMOSIS, HEMORRHOIDS,RESCOPE(CY) 12/2025, DR. PADMAJA ESPARZA AT [facility]    TRANSRECTAL ULTRASOUND N/A 07/24/2019    Procedure: ULTRASOUND TRANSRECTAL;  Surgeon: Padmaja Esparza MD;  Location: [facility];  Service: General    URETEROSCOPY LASER LITHOTRIPSY  WITH STENT INSERTION Right 10/30/2019    DR. ESTUARDO BEASLEY AT [facility]    VAGINAL DELIVERY N/A 09/18/1998    VENOUS ACCESS DEVICE (PORT) INSERTION Left 08/09/2019    Procedure: INSERTION VENOUS ACCESS DEVICE;  Surgeon: Sj Joseph MD;  Location: [facility];  Service: General    VENOUS ACCESS DEVICE (PORT) INSERTION N/A 12/18/2020    Procedure: INSERTION VENOUS ACCESS DEVICE right side, removal venous access device left side;  Surgeon: Sj Joseph MD;  Location: [facility] OR [facility];  Service: General;  Laterality: N/A;    WISDOM TOOTH EXTRACTION Bilateral 1993       HEMATOLOGIC/ONCOLOGIC HISTORY:      The patient reports she has intermittent rectal bleeding and urgency, mucous with her stool, starting sometime in 2016. At that time she was referred to GI service, and was diagnosed of irritable bowel syndrome. Nevertheless she had worsening urgency for bowel movement, and had a couple of incidents losing control of stool. She was recently seen by Roland Thorpe MD again and had colonoscopy and EGD exam on 07/08/2019. She was found having a circumferential rectal mass. According to the procedure note, the patient had a fungating circumferential bleeding 4 cm mass in the middle rectum, at distance between 13 cm and 17 cm from the anus. Mass was causing partial obstruction. There were also colon polyps found at the hepatic flexure and also at the rectosigmoid junction 23 cm from the anus. Both were resected and retrieved. EGD examination reported grade A esophagitis at the GE junction and patchy discontinuous erythema and aggravation of the mucosa without bleeding in the stomach body. There is normal mucosa of the duodenum. Pathology evaluation from this procedure reported moderately differentiated adenocarcinoma involving the rectal mass. The rectal sigmoid junction polyp was tubular/tubulovillous adenoma with high grade dysplasia negative for invasive malignancy. The hepatic flexure polyp was also  tubular/tubulovillous adenoma negative for high grade dysplasia or malignancy. The biopsy from the esophagus reports squamocolumnar mucosa with inflammatory changes suggestive of mild reflux esophagitis, negative for interstitial metaplasia. Gastric biopsy was benign and duodenal biopsy was also benign. There is no mention of H-pylori.     Patient was subsequently referred to colorectal surgeon Padmaja Ye MD for further evaluation. The patient had CT scan examination for chest, abdomen and pelvis requested by Dr. Ye and were done on 07/13/2019. The study reported no evidence of lymphadenopathy in the abdomen and pelvis. There was wall thickening of the rectosigmoid junction. Normal spleen, pancreas, adrenal glands and kidneys. There was fatty liver infiltration but no focal lymphatic lesions. There was a small 6-7 mm noncalcified nodule in the right upper lobe and a tiny 3 mm subpleural nodule in the right middle lobe. No mediastinal or hilar lymphadenopathy.     Dr. Ye performed staging rectal ultrasound examination. This study reported tumor penetrating through the muscularis propria as T3 disease; there were no lymph nodes identified.    She had a hospitalization in late September 2019 because of newly discovered pulmonary emboli.  She was on prophylactic Lovenox prior to the incident of PE.  Now she is on full dose Xarelto anticoagulation.  Patient reports no further chest pain dyspnea.  She denies bleeding or bruising.  During her hospitalization, she was seen by Dr. Ye, who plans to have surgery 8 to 12 weeks after finishing radiation therapy.  She finished her radiation on 9/19/2019.     I noticed patient also presented to the emergency room on 10/1/2019 complaining of right flank area pain.  I reviewed the images studies and indeed she has a very small 1 to 2 mm obstructing kidney stone in the UV junction.  Patient is still symptomatic with some pains and dysuria.  She denies fever sweating or  chills.    Laboratory study on 10/7/2019 reported normal CBC including hemoglobin 13.1, platelets 301,000, WBC 6170 and ANC 4900 lymphocytes 590 monocytes 480.      The nurse reported malfunction of port-a-catheter on 10/7/2019.  Port study on 10/8/2019 showed fibrin sheath around the distal aspect of the Mediport catheter in the SVC. This does not appear to hinder injection, but does prevent aspiration at this time.    Patient underwent surgical resection of the colon on 12/2/2019 with Dr. Ye.  Pathology evaluation reported residual T3 disease, also 1 out of 14 lymph nodes positive for malignancy.  So this patient in either had at least stage IIIb disease (T3 N1 M0?).      Adjuvant chemotherapy FOLFOX 6 will be started on 1/22/2020.  Laboratory study reported iron saturation 10%, free iron 31 TIBC 319 and ferritin 168.  Her hemoglobin was 11.8, WBC 5600, and platelets 347,000.    Patient was here on 02/12/2020 for cycle 2 of her FOLFOX.  This is delayed x1 week secondary to neutropenia.  The patient ultimately received 3 days worth of Neupogen with recovery of her blood counts.  Of note, the patient struggled with significant nausea without any episodes of vomiting following cycle #1 of chemotherapy resulting in significant weight loss.  She is up 12 pounds over the last week as her appetite has normalized.  We will add Emend to her care plan.    She is having several loose stools per her colostomy and has been hesitant to take Imodium due to prior history of constipation.  I encouraged her to try this with a maximum of 8 tablets/day.  She denies any infectious symptoms including fevers or chills.  No excess bleeding or bruising noted.  She had the expected cold sensitivity related to the Oxaliplatin for about 3 days following treatment.    Labs from 02/12/2020 demonstrated total white blood cell count of 5.14, ANC of 3.06, hemoglobin of 11.2, platelets of 211,000.  She was found to be iron deficient last week  and is intolerant to oral iron secondary to GI distress.  For this reason, she initiated IV iron therapy with Injectafer last week.  She had received her second dose 02/12/2020    Patient has normalized hemoglobin 12.2 on 2/26/2020.    She reported on 5/5/2020 she had a recent visit to the emergency department for what was suspected to be an allergic reaction.  She called our on-call service on Saturday with reports of hand and face swelling.  She was instructed to proceed to the emergency department at which time she was assessed and prescribed a Medrol Dosepak.  She had just completed her 5-FU and was unhooked on Friday, 5/3/2020.  Her symptoms have improved.  She does report persistent hyperpigmentation and mild swelling of the palms of the hands but this is much improved.  It was her right hand specifically that was swollen.  Her facial swelling has resolved.  She continues on Cefdinir nd since has 1 day remaining, she was prescribed cefdinir for a UTI requiring hospitalization at the end of April.  Reports no new symptoms.  Her labs are stable.  She is scheduled for treatment again.    Patient states at this time she is not tolerating her chemotherapy well.     Patient seen in the emergency department on 5/2/2020 for what was suspected to be an allergic reaction.  She called our on-call service on Saturday explaining that she was experiencing hand and face swelling.  She was instructed to proceed to the emergency department at which time she was assessed and prescribed a Medrol Dosepak.  She had just completed her 5-FU and was unhooked on Friday, 5/1/2020.      She reports since her ED visit on 5/2/2020 her symptoms have not improved. Her hands and feet were swollen upon presenting to the ED and she could barely make a fist. She states that she feels so swollen she is not able to stand it. She states that her skin on the hands and feet are peeling extensively as well, besides changing color to more dark.      She also reports significant fatigue after her first week of the 5-FU treatment but she expected this side effect. She also notices significant increase in her neuropathy to the point that she is not able to even walk around in her home without her house slippers due to irritation from her rugs. She denies and associated nausea or vomiting at this time. She also has episodes of epistaxis every day after the chemotherapy cycle #7.  She does report working full-time during the week of chemotherapy.    Laboratory studies, 5/13/2020, show moderate thrombocytopenia platelets 123,000, low normal WBC 4140 including ANC 2720 and normal hemoglobin 13.4.  Because significant hand-foot syndrome, will decrease both 5-FU and oxaliplatin by 50%, and eliminate bolus dose of 5-FU.    On 5/21/2020 patient had a PET scan performed which showed no convincing evidence of residual disease in abdomen or pelvis, no metastatic disease within the chest or neck.     Cycle #8 FOLFOX 6 was given on 5/13/2020, with 50% dose reduction for both 5-FU and oxaliplatin, and also examination of bolus 5-FU.  She states on 5/27/2020 that with the recent reduction of the chemotherapy she feels significantly better. She has more energy and is able to do her daily routine.      PET scan examination on 5/21/2020 reported no evidence of metastatic disease in chest abdomen pelvis.  There was a stable 6 mm right upper lobe pulmonary nodule.    Laboratory studies on 5/27/2020 showed mild leukocytopenia WBC 3720 but a normal ANC 2250 and lymphocytes 630.  Normal platelets 163,000 and hemoglobin 12.6.  Chemistry lab reported normal renal function, liver function panel and a electrolytes, elevated glucose 164.    Laboratory studies 6/24/2020, showed normal hemoglobin 13.4 but macrocytosis .9.  Normal platelets 210,000 and WBC 5870.  She had normal CMP.     Patient last time was here in late June 2020.  Since that time she had surgical procedure for low  anterior colon resection, coloanal anastomosis on 8/3/2020.  She later developed a perirectal abscess, required surgical drainage on 8/14/2020.  She was discharged on 8/27/2020.    Patient reports to me she still has lower abdominal wall vacuum suction in place.  She denies fever sweating chills.  Performance status is ECOG 1.  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  She does have visiting nurse come to the home for wound care.    CT scan for chest abdomen pelvis on 9/9/2020 reported a new 8 mm noncalcified pulmonary nodule in the left lower lobe.  Otherwise stable right upper lobe 6 mm pulmonary nodule, and no evidence of disease recurrence in the abdomen or pelvis.     Laboratory study on 9/16/2020 reported elevated AST 55, ALT 95, alk phosphatase 143 but normal total bilirubin 0.3.  Chemistry lab otherwise unremarkable.  She has completed normal CBC.      Because of the abnormal CT scan examination for chest abdomen pelvis on 9/9/2020 reported a new 8 mm noncalcified pulmonary nodule in the left lower lobe, we obtained a PET scan examination for further evaluation, which was done on 9/18/2020.  This study reported several pulmonary nodules, some of them are hypermetabolic, newly developed compared to previous PET scan in May 2020.  Certainly does highly suspicious for metastatic disease.  There are also hypermetabolic activity in the abdomen and pelvic area which is hard to differentiate from surgical scar versus metastatic malignancy.    Laboratory study on 10/13/2020 reported normal CBC with Hb 13.9, platelets 302,000 and WBC 5520 including ANC 3830.  Chemistry lab reported normalized AST 20, alk phosphatase 116 improved ALT 35, and maintains normal bilirubin, with normal electrolytes and liver function panel.     Patient was started on first cycle of palliative chemotherapy FOLFIRI on 10/13/2020.    She recently had hospitalization from 12/21/2020 through 12/24/2020 with a new  thrombus of the superior vena cava which developed after a new PowerPort catheter placement while the patient was on Xarelto.  Patient had a new port placed 12/18/2020, and had held her Xarelto for 2 days prior to surgery.  She presented to the ER on 12/21/20 with complaints of facial and arm swelling for 3 days.  She also noted increased shortness of breath.  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  Thrombus within the right internal jugular vein cannot be excluded.  Patient was started on IV heparin, and eventually transitioned to weight-based Lovenox, which she now continues.    PET scan examination on 9/24/2021 reported further shrinking of the tiny right upper lobe pulmonary nodule.  Otherwise no new lesions.  No evidence for metastatic disease in other areas.      Patient had CT scan for chest with IV contrast obtained on 12/14/2021 which reported small tiny stable pulmonary nodules. There is a 4 mm right upper lobe pulmonary nodule. There is also a stable 4 mm left lower lobe pulmonary nodule. There is also stable left upper lobe micronodule.     Laboratory studies today on 12/21/2021 reported normal hemoglobin 15.9 however .0, platelets 218,000 WBC 5360 including neutrophils 3730 lymphocytes 980. Chemistry lab reported normal renal function, electrolytes, glucose, and marginally elevated ALT 55, AST 34 but normal total bilirubin and alk phosphatase.     CT scan for chest abdomen pelvis 6/21/2022 reported sub-6 mm pulmonary nodules either stable or slightly smaller.  No new pulmonary nodules or evidence for disease progression.  There is no evidence for metastatic disease in the liver however it shows diffuse hepatic steatosis.  There was masslike thickening in the presacral space with the clips or calcification approximately 1.7 cm in greatest AP dimension but stable.    Laboratory study on 9/20/2022 reported normal hemoglobin 15.7 with mild macrocytosis .1.   She has normal CBC and platelets.  She also has unremarkable CMP.  Normal CEA 1.13 ng/mL.    Patient was seen by Dr. Ye, who performed ileostomy takedown on 11/14/2022.  Patient reports that she is recovering.  She still has a small open wound less than 1 cm in diameter, however close to 2 cm deep.  She has been changing dressing herself.  She denies fever sweating chills.    Patient has no other specific complaints.  She has excellent performance status ECOG 0.  She denies chest pain dyspnea cough hemoptysis.  No abdominal pain.  No melena hematochezia.  Patient has been eating well, stable weight.  She works full-time.    She continues Lovenox injections and denies any significant bleeding issues.   No other new problems or concerns.     CT scan for chest abdomen pelvis obtained on 1/10/2023 reported stable small pulmonary nodules, no evidence for active or new disease.    Laboratory study on 1/10/2023 reported normal CBC and normal CMP.  CEA level is 0.99 ng/mL.    CT scan for chest, abdomen, and pelvis on 7/7/2023 reported stable small pulmonary nodules including a left upper lobe 5 mm nodule. There were no new masses, or pleural effusion. No enlarged supraclavicular, axillary, mediastinal, or hilar lymphadenopathy. Mediastinal vasculature normal. There is occlusion of the superior vena around the catheter with body wall  is present. There was no evidence for disease recurrence in the abdomen or pelvis. Bone is also negative for metastatic disease.    Laboratory studies obtained on 07/07/2023 also reported normal CBC, and normal CMP as well as low level CEA 1.07 ng/mL.    The CT scan for the chest on 10/27/2023 reported enlarging pulmonary nodules bilaterally. The right upper lobe lung nodule increased from 7 x 7 mm to 9 x 8 mm, and the left upper lobe nodule measured 8 x 9 mm from 5 x 6 mm in 04/2023. There is a different left upper lobe nodule 6 x 8 mm from previous 5 x 5 mm. The presacral tissue  thickness measures up to 2.3 cm.    A laboratory study on 10/27/2023 reported CEA 1.58 ng/mL, normal CBC, and CMP.  Molecular study of peripheral blood Guardant Reveal is still pending.    The patient presents today on 11/17/2023 for reevaluation to discuss the results of PET scan examination as well as the results of tumor conference. I saw her recently on 11/03/2023 and because of enlarging pulmonary nodules on the CT scan, we requested a PET scan examination. I presented her case yesterday on 11/16/2023 at the [facility].    The patient reports she is at her baseline condition as 2 weeks ago. No specific complaints, no chest pain, dyspnea, cough, etc. She has a regular bowel movement.    Her case was present at the [facility]. on 11/16/2023. The PET scan images and chest CT scan were reviewed in conjunction with her treatment history. The consensus was for patient to receive stereotactic radiation therapy for the right upper lobe lung lesion, and the bigger left upper lobe lesion, and monitor the smaller left upper lobe lesion with further images studies down the line. Also, the conference recommended Guardant Reveal study which we already ordered.     This Guardant Reveal study came back today as negative for ctDNA 0%.        CT of the chest on 2/2/2024 reported shrinking of the right upper lobe lesion from 9 mm to 6 mm, and the left upper lobe lesion also decreased from 9 mm to 6 mm. Otherwise, there are a couple of stable pulmonary nodules, 7 to 8 mm. The right hilar has a small lymph node that has increased from 6 mm to 8 mm and is otherwise stable. There was no suspicious lesion in the abdomen or pelvis.    Colonoscopy examination 9/11/2024 showed patchy mild inflammation characterized by congestion/edema in the descending colon. Evidence of previous endo coloanal anastomosis in the rectum. Presence of hemorrhoids.      MEDICATIONS    Current Outpatient Medications:      bismuth subsalicylate (PEPTO BISMOL) 262 MG/15ML suspension, Take 15 mL by mouth 4 (Four) Times a Day., Disp: , Rfl:     clonazePAM (KlonoPIN) 1 MG tablet, Take 1 tablet as needed daily for anxiety. May take one additional as needed for severe anxiety., Disp: 45 tablet, Rfl: 3    Cyanocobalamin (VITAMIN B-12 PO), Take 1 tablet by mouth 3 (Three) Times a Week. M-W-F, Disp: , Rfl:     dicyclomine (BENTYL) 20 MG tablet, TAKE 1 TABLET BY MOUTH EVERY 6 (SIX) HOURS AS NEEDED FOR IRRITABLE BOWEL SYMPTOMS (Patient taking differently: Take 1 tablet by mouth Every Evening.), Disp: 360 tablet, Rfl: 2    diphenoxylate-atropine (LOMOTIL) 2.5-0.025 MG per tablet, TAKE 2 TABLETS BY MOUTH 4 TIMES A DAY, Disp: 240 tablet, Rfl: 11    Enoxaparin Sodium (LOVENOX) 100 MG/ML solution prefilled syringe syringe, INJECT 1 ML UNDER THE SKIN INTO THE APPROPRIATE AREA AS DIRECTED EVERY 12 (TWELVE) HOURS., Disp: 60 mL, Rfl: 2    escitalopram (LEXAPRO) 10 MG tablet, Take 1 tablet by mouth Daily. (Patient taking differently: Take 1 tablet by mouth Every Evening.), Disp: 90 tablet, Rfl: 1    famotidine (PEPCID) 20 MG tablet, TAKE 1 TABLET BY MOUTH TWICE A DAY (Patient taking differently: Take 1 tablet by mouth Every Evening.), Disp: 180 tablet, Rfl: 2    hydrocortisone 2.5 % cream, APPLY RECTALLY 3 TIMES DAILY. INCLUDE APPLICATOR., Disp: , Rfl:     Hydrocortisone, Perianal, (Anusol-HC) 2.5 % rectal cream, Apply rectally 3 times daily.  Include applicator., Disp: 30 g, Rfl: 1    Imvexxy Maintenance Pack 10 MCG insert, Insert 10 mcg into the vagina 2 (Two) Times a Week., Disp: , Rfl:     Loperamide HCl (IMODIUM PO), Take  by mouth As Needed., Disp: , Rfl:     Loratadine 10 MG capsule, Take 1 capsule by mouth Every Evening., Disp: , Rfl:     metoprolol succinate XL (TOPROL-XL) 25 MG 24 hr tablet, TAKE 0.5 TABLETS BY MOUTH EVERY NIGHT, Disp: 45 tablet, Rfl: 2    nystatin-hydrocortisone-bacitracin in zinc oxide ointment, Apply  topically to the  "appropriate area as directed Daily., Disp: 60 g, Rfl: 2    octreotide (sandoSTATIN) 100 MCG/ML injection, Inject 1 mL under the skin into the appropriate area as directed 3 (Three) Times a Day., Disp: 84 mL, Rfl: 1    ondansetron (ZOFRAN) 8 MG tablet, TAKE 1 TABLET BY MOUTH THREE TIMES A DAY AS NEEDED FOR NAUSEA AND VOMITING, Disp: 60 tablet, Rfl: 1    pantoprazole (Protonix) 40 MG EC tablet, Take 1 tablet by mouth Daily., Disp: 90 tablet, Rfl: 1    rosuvastatin (CRESTOR) 5 MG tablet, TAKE 1 TABLET BY MOUTH EVERY DAY, Disp: 90 tablet, Rfl: 0    HYDROcodone-acetaminophen (NORCO) 5-325 MG per tablet, Take 1 tablet by mouth Every 6 (Six) Hours As Needed for Moderate Pain., Disp: 45 tablet, Rfl: 0  No current facility-administered medications for this visit.    ALLERGIES:   No Known Allergies    SOCIAL HISTORY:       Social History     Tobacco Use    Smoking status: Every Day     Current packs/day: 1.00     Average packs/day: 1 pack/day for 25.0 years (25.0 ttl pk-yrs)     Types: Cigarettes     Passive exposure: Current    Smokeless tobacco: Never    Tobacco comments:     1 PPD/caffeine use    Vaping Use    Vaping status: Some Days    Substances: Nicotine    Devices: Disposable    Passive vaping exposure: Yes   Substance Use Topics    Alcohol use: Not Currently     Comment: RARELY    Drug use: Never         FAMILY HISTORY:   Mother has positive factor V Leiden mutation, although she did not have thrombosis, mother also is coronary disease with stenting, she also is occasional bruising.    Maternal grandmother had DVT, she had multiple surgical procedures.    Patient mother's half-brother had metastatic colon cancer at diagnosis in his 50s.    Maternal great aunt had breast cancer.           Vitals:    10/16/24 0934   BP: 109/76   Pulse: 64   Resp: 16   Temp: 97.8 °F (36.6 °C)   TempSrc: Oral   SpO2: 97%   Weight: 89 kg (196 lb 1.6 oz)   Height: 167 cm (65.75\")   PainSc: 0-No pain           10/16/2024     9:35 AM "   Current Status   ECOG score 0     Physical Exam      Physical Exam  Vitals reviewed.   Constitutional:       Appearance: Normal appearance. She is well-developed.   HENT:      Head: Normocephalic and atraumatic.      Comments:         Nose: Nose normal.   Eyes:      Conjunctiva/sclera: Conjunctivae normal.   Cardiovascular:      Rate and Rhythm: Normal rate and regular rhythm.   Pulmonary:      Effort: Pulmonary effort is normal.      Breath sounds: Normal breath sounds.   Abdominal:      General: Bowel sounds are normal. There is no distension.      Palpations: Abdomen is soft. There is no mass.      Tenderness: There is no abdominal tenderness.   Musculoskeletal:      Right lower leg: No edema.      Left lower leg: No edema.   Skin:     Findings: No rash.   Neurological:      Mental Status: She is alert. Mental status is at baseline.   Psychiatric:         Mood and Affect: Mood normal. Affect is tearful.           RECENT LABS:  Results from last 7 days   Lab Units 10/16/24  0908   WBC 10*3/mm3 5.06   NEUTROS ABS 10*3/mm3 3.13   HEMOGLOBIN g/dL 14.0   HEMATOCRIT % 43.3   PLATELETS 10*3/mm3 143     Results from last 7 days   Lab Units 10/16/24  0908   SODIUM mmol/L 143   POTASSIUM mmol/L 4.1   CHLORIDE mmol/L 106   CO2 mmol/L 26.8   BUN mg/dL 8   CREATININE mg/dL 0.65   CALCIUM mg/dL 8.6   ALBUMIN g/dL 3.5   BILIRUBIN mg/dL <0.2   ALK PHOS U/L 73   ALT (SGPT) U/L 14   AST (SGOT) U/L 13   GLUCOSE mg/dL 110*           IMAGING:  NM PET/CT Skull Base to Mid Thigh (08/14/2024 09:17)     Assessment & Plan      ASSESSMENT:   1.  Metastatic rectal cancer.   Initial rectal ultrasound examination reported T3N0 disease without lymphadenopathy.   CT scan of chest, abdomen and pelvis reported no lymphadenopathy in the abdomen, pelvis or chest. She does have small pulmonary nodule 6-7 mm and 2 mm with indeterminate feature. There is no solid evidence of metastatic disease otherwise.   Based on the CT scan and rectal ultrasound  imaging studies, this patient had stage IIA (T3N0M0) disease.   She had PET scan examination on 8/8/2019 which reported a hypermetabolic right upper lobe pulmonary nodule 6 mm with SUV 5.6.  This is suspicious for metastatic disease however it is too small to be biopsied.  This patient may have stage IV disease.   She initiated concurrent radiation with continuous 5-FU on 8/12/2019.  Patient finished concurrent chemoradiation therapy.  Patient underwent surgical resection of the rectal tumor and diverting loop ileostomy on 12/2/2019 with Dr. Ye.  Pathology evaluation reported residual T3 disease, with 1 out of 14 lymph nodes positive for malignancy.  Certainly this patient has at least stage IIIb rectal cancer (T3N1M0?)  On 1/22/2020 adjuvant chemotherapy FOLFOX 6 regimen initiated.   On 2/5/2022 cycle #2 was delayed secondary to neutropenia.  She was given 3 days of Granix.   Emend added with cycle 3.  With improved nausea control  Continuing home Granix x3 days following 5-FU disconnect  3/11/2020 due for cycle 4, however, she is experiencing progressive abdominal pain and occasional fevers.   CT scan performed on 3/13/2020 reveals no evidence of progressive or recurrent disease.  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  Patient hospitalized 4/24-4/26/20 after cycle 5 of chemotherapy with acute UTI.  CT abdomen/pelvis noting fluid collection in the presacral region having diminished in size compared to CT dated 3/13/2020.  Patient was evaluated by Dr. Ye while in the hospital with further plans to evaluate possible colovaginal fistula following completion of chemotherapy.  Patient did respond to IV antibiotics and discharged home on oral cefdinir.  4/29/2020 cycle 6 of FOLFOX.  Urinary symptoms have resolved   Patient seen in the [facility] ED on 5/2/2020 for what was suspected to be an allergic reaction.  She called our service on Saturday explaining that she was experiencing hand and face swelling.  She  was instructed to proceed to the emergency department at which time she was assessed and prescribed a Medrol Dosepak.  She had just completed her 5-FU and was unhooked on Friday, 5/1/2020.  Her symptoms have resolved in the office on assessment, 5/5/2020.  The patient had grade 3 hand-foot syndrome based on symptomology.  Patient had cycle #8 of 5-FU on 5/13/2020. Due to her symptoms and poor tolerance to the 5-FU, her treatment dose will be reduced 50% for oxaliplatin and infusional 5-FU.  Bolus 5-FU will be discontinued..  On 5/21/2020 patient had a PET scan and it showed no evidence of of metastatic disease in the neck, chest, abdomen or pelvis.  There was fluid accumulation/abscess.   On 5/27/2020 discussed with the patient that we can discontinue chemotherapy at this time.  She will follow-up with Dr. Ye to discuss a possibility to reverse the ileostomy.  We likely will obtain anther PET scan in 3 to 4 months for reassessment.    Patient was seen by Dr. Ye on 6/19/2019 for evaluation and to discuss possible take down of her ileostomy.  She is scheduled to have a gastrografin enema on 7/2/2020 to evaluate for a fistula, and then a colonoscopy on 7/15/2020, both done by Dr. Ye.  She states that based on the above imaging and procedure findings, she may have a more extensive surgery done or just have her ileostomy reversed, which would likely be done in August 2020.  This will all be coordinated under the care of Dr. Ye.     CT scan from 09/09/2020 and also compared to her previous PET scan examination from 05/21/2020 and also the original PET scan from 08/09/2019.  The original PET scan there is a small right upper lobe pulmonary nodule 6 mm with SUV 5.6. So in the 05/2020 PET scan the nodule is still there but activity seems much less and this most recent CT scan examination also documented the preexisting small pulmonary nodule however there is a new left lower lobe pulmonary nodule 8 mm in size and  I shared with the patient today this nodule is suspicious for metastatic disease. Laboratory studies reported minimal CEA level 1.32 on 09/09/2020. Liver function panel was only marginally abnormal with the ALT 45, the remaining is normal. However laboratory study today showed worsening AST 55, ALT 95 and alkaline phosphatase 143 but is still normal, total bilirubin 0.3.   Recommended to have repeat PET scan examination for assessment because the left lower lobe pulmonary nodule is too small to be biopsied, and if the PET scan reports hypermetabolic lesion, I recommended to have stereotactic radiation therapy. Explained to patient that she is not a really good candidate to have thoracotomy for resection because she still has unhealed wound in the abdomen. Stereotactic radiation therapy will likely be a more feasible choice.   PET scan examination obtained on 9/18/2020 showed metastatic disease.  It reported a few hypermetabolic pulmonary nodules, and some new small pulmonary nodules, all of them highly suspicious for metastatic disease.  She also has hypermetabolic activity in the abdomen and pelvic area which could be related to scar tissue from her recent surgery versus metastatic disease.  Nevertheless overall picture fits with metastatic cancer.  Discussed with the patient on 9/20/2020, we reviewed the PET scan images together, and I recommended to have systematic chemotherapy, I do not think stereotactic reading therapy to the hypermetabolic lesions in the lungs warranted at this time because even those will be treated with reading therapy, she will still need systematic chemotherapy.  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  I did discuss with the patient using anti-EGFR monoclonal antibody versus anti-VEGF monoclonal antibody.     Palliative chemotherapy cycle#1 FOLFIRI was started on 10/13/2020.  No bolus 5-FU given considering previous poor tolerance.    NGS study  from [facility] reported positive for [place] mutation.  Microsatellite stable, mutation burden 5/Mb.   Discussed with the patient 10/27/2020, because of the K-s[place] mutation, she is not a candidate for anti-EGFR monoclonal antibody such as Erbitux or vectibix.  She could be a candidate for anti-VEGF monoclonal antibody, however because of active wound, she is not a candidate right now at this moment.  Patient seen in the ED for acute right neck pain on 11/16/2020.  CT angiogram as well as CT of the cervical spine performed with no acute findings but notably one of her pulmonary nodules, left upper lobe, somewhat decreased in size.  Patient given prednisone pack.  Further details below.  Cycle #6 chemotherapy will be resumed on 1/5/2021.  Started back on original irinotecan.  PET scan examination obtained on 1/12/2021 after cycle #6 chemotherapy reported improved pulmonary nodule hypermetabolic activity.  Confirmed these are metastatic lesions.  Patient is evaluated 1/19/2020, will continue cycle #7 FOLFIRI chemotherapy.  However Avastin will be on hold see next section.   Patient was reevaluated on 2/2/2021, will continue chemotherapy cycle #8 FOLFIRI.  Avastin / biosimilar will be added.    She talked to [facility] specialist in mid February 2021, and had recommended palliative chemotherapy without surgical intervention of metastatic lung lesions.   On 3/2/2021, patient will proceed with cycle #10 FOLFIRI plus bevacizumab.  After 12 cycles, plan to start maintenance treatment.  3/16/2021 cycle 11.  Plus bevacizumab.  3/30/2021 cycle 12 FOLFIRI plus bevacizumab.  Having issues with worsening nausea despite premeds with dexamethasone, Aloxi, Emend, and Zofran at home.  Patient is requesting a dose of Phenergan, which is helped her in the past.  We will give this to her with treatment today.  PET scan examination on 4/6/2021 reported no evidence of hypermetabolic metastatic  lesion.  Discussed with the patient on 4/13/2021, we reviewed the PET scan results.  We recommended to switch to maintenance chemotherapy without irinotecan.  We will continue 5-FU and Avastin every 2 weeks.  We discussed possibility of switching to oral Xeloda treatment.  Discussed with the patient for side effects more so with hand-foot syndrome.  Patient is agreeable.   Xeloda 2000 mg twice daily 7 days on, 7 days off along with Avastin initiated 4/27/2021.  After only 5 doses of Xeloda significant hand-foot syndrome, Xeloda held. Patient requesting to be transitioned back to infusional 5-FU, as she felt that she tolerated infusional 5-FU without any problem.  The patient will receive 5-FU leucovorin and Avastin every 2 weeks.  Xeloda discontinued.  5/25/2021 continued cycle #4 maintenance 5-FU, leucovorin, Avastin tolerating this much better so far, we will continue this. Recommended to have 12 cycles of maintenance chemotherapy, then obtain PET scan for reevaluation.  We could discuss further treatment plan at that time.  9/14/2021 patient due for cycle 12 of additional maintenance 5-FU/leucovorin plus Avastin.  She continues to tolerate treatment well overall.  She is anxious in the office today with her Mediport not getting blood at first and also with upcoming scans being heavy on her mind.  She was instructed to take one of her Klonopin pills while here to help calm her mind.  Heart rate did improve from 140s down to 112.  Proceed with treatment today as scheduled.  PET scan examination on 9/24/2021 reported further shrinking of the right upper lobe tiny pulmonary nodule.  No other new lesions.  Discussed with patient on 9/24/2021 about further shrinking of the right upper lobe pulmonary nodule and no evidence for other new lesions. We recommended to have chemo break for 3 months with repeated CT scan for reevaluation.  Recent CT scan for chest 12/14/2021 and abdomen CT from 11/29/2021 reported no evidence  for active disease. The right upper lobe pulmonary nodule, left lower lobe pulmonary nodule minimal about 4 mm and stable. I discussed with patient today on 12/21/2021, recommended to give her another 3 months chemotherapy holiday and obtain CT scan afterwards for reevaluation. After discussion, patient is agreeable.   CT scan examination for chest abdomen and pelvis obtained on 3/15/2022 reported a slight increase of the left upper lobe pulmonary nodule 5 mm, from previous 3 mm.  The other pulmonary nodules were stable in size.  There is also small left upper lobe groundglass changes.   Dr. Nguyen reviewed images studies with the patient 3/23/2022, compared to multiple previous images including CT chest CT and PET scan, suspected disease progression.  Discussed with the patient, and I recommended to resume maintenance chemotherapy with 5-FU plus leucovorin plus Avastin repeating every 2 weeks, and obtaining CT scan for reassessment in 3 months.  Patient is agreeable.  Patient resumed maintenance chemotherapy on 3/29/2022 with 5-FU leucovorin and Avastin.   4/26/2022, proceed with cycle #27 maintenance 5-FU, leucovorin, and Avastin.  Patient continues to tolerate treatment well.    On 5/10/2022, we will proceed ahead with cycle #28 maintenance chemotherapy.  Plan to have CT scan after cycle #30.  5/24/2022 cycle 29 maintenance chemotherapy.  Continue to tolerate well.  6/7/2022 cycle #30 maintenance chemotherapy, continuing to tolerate well.   CT scan for chest abdomen pelvis with IV contrast obtained on 6/21/2022 reported sub-6 mm pulmonary nodules either stable or slightly smaller.  We reviewed the images with the patient together.  We discussed with the patient and recommended to hold chemotherapy for now with repeating CT scan in 3 months for reassessment.  CT scan of the chest abdomen pelvis 9/13/2022 reported stable condition, no evidence for recurrent or metastatic colon cancer.  On 1/10/2023 patient had a CT  chest abdomen pelvis with reported no evidence for disease progression.  Laboratory study also showed normal CEA.   Patient had a CT scan for chest, abdomen, and pelvis obtained on 04/11/2023. This study reported very small pulmonary nodules, the right upper lobe nodule is stable to 6 mm, however, the left upper lobe and the left lower lobe nodule increased to 5 mm from previous 4 mm. I discussed with the patient today on 04/19/2023, I recommend to obtain another CT scan in 3 months for reassessment. The nodules are so small, they likely will not be picked up by PET scan examination.  CT scan examination of the chest, abdomen, and pelvis obtained on 7/7/2023 reported stable small pulmonary nodules. No evidence for disease progression or new lesions in chest, abdomen, and pelvis.  Discussed with patient today on 7/14/2023, however, patient is concerning for disease progression. I recommended to obtain Guardant Reveal for circulating tumor DNA study. If the study is positive, we will further obtain PET scan examination, otherwise, we will obtain CT scan for chest, abdomen, and pelvis in 3 months for reassessment.  The patient had CT scan for the chest, abdomen, and pelvis obtained on 10/27/2023, which reported worsening pulmonary nodules at bilateral upper lobes. Laboratory studies showed low normal CEA level and normal liver function panel. The Guardant Reveal study is still pending. I discussed this with the patient on 11/03/2023 and we shared the images, and I recommended having a PET scan examination for further assessment. I will present her case at the multimodality lung conference. If those lesions are deemed to be metastatic lesions, I recommend having stereotactic radiotherapy. I discussed it with the patient, and she voiced understanding and was agreeable with that strategy.  I presented her case at the multimodality chest conference 11/16/2023.  The consensus was for patient to receive stereotactic  radiation therapy for the right upper lobe lung lesion, and the bigger left upper lobe lesion, and monitor the smaller left upper lobe lesion with further images studies down the line. Also, the conference recommended Guardant Reveal study which we already ordered. I discussed with the patient today on 11/17/2023, we explained to the patient that the opinion of the conference and she is agreeable with this and prefers this strategy because of limited toxicity compared to long term commitment to starting chemotherapy again. I recommend to obtain a CT scan for the chest, abdomen, and pelvis with both IV and oral contrast for reassessment in 3 months for reassessment of metastatic lung lesions and thickness in the pelvis where the PET scan reported a low activity SUV 2.4 in the surgical bed.   The patient had steroid-induced radiation therapy for the right upper lobe and one of the left upper lobe lesions.  Her CT scan examination on 02/02/2024 reported shrinking nodules of the right upper lobe and one of the left upper lobe lesions, both from 9 mm down to 6 mm. There are 2 stable pulmonary nodules 7 mm. Nothing new.  02/09/2024, I discussed with the patient and recommended CT scan for the chest, abdomen, and pelvis in 3 months for reassessment. Guardant Reveal study was 0% ctDNA. We will also continue laboratory studies every 3 months for Guardant Reveal, together with routine labs, CBC, CMP, and CEA.  CT scan of the chest, abdomen, and pelvis conducted on 4/23/2024 revealed stable multiple pulmonary nodules, with no other new pulmonary nodules or evidence of disease recurrence or abnormal pelvis. The patient's liver function panel was normal, and low-level CEA level was also noted. The Guardant Reveal study was able to be obtained earlier due to regulatory rules, and we hugo obtain today the Guardant reveal study, be available within 10 to 14 days.   Given the patient's metastatic liver lesion, and lung lesions from  colon cancer, careful monitoring is necessary. A chest CT scan with IV contrast will be arranged in 3 months for reevaluation. The study will be reviewed again in 3 months, which will include CBC, CMP, and CEA level.   Imaging study with chest CT scan on 08/02/2024 reported multiple bilateral pulmonary nodules that are relatively stable. However, the Guardant Reveal reported positive ctDNA indicating disease progression. A subsequent PET scan examination on 08/14/2024 reported newly developed hypermetabolic pulmonary nodules. The highest SUV is 3.7, corresponding to an 8 mm new right upper lobe nodule, and there are several other hypometabolic nodules in the lungs.   8/21/2024 resumption of chemotherapy with 5-FU bevacizumab  Triage visit 8/28/2024 with intractable diarrhea that was unclear if this is secondary to chemotherapy or infectious etiology given her known chronic colitis, fever and abdominal pain.  Further management as outlined below  9/4/2024 per review today diarrhea has improved though she is having some difficulty with passage of gas and stool,?  Related to significant inflammation in the rectum versus tumor obstruction.  The passage of gas has improved in the last few days but she does still have to change positions on the toilet to get stool to pass without it being painful.  Discussed all of this with Dr. Nguyen and we will refer the patient urgently back to Dr. Ye for at the very least rectal examination in the office versus full repeated colonoscopy.  Because of the potential for examination or invasive procedures, we will hold bevacizumab today.  Because the patient had significant diarrhea last week and concerns that it may be related to chemotherapy we will decrease her 5-FU/leucovorin today by 30%.  If she does well we can go back to full dose with cycle 3.     9/18/2024 she presented for evaluation prior to cycle #3 of palliative chemotherapy with 5-FU, leucovorin, and bevacizumab. Given  her recent biopsy on 09/11/2024, it is prudent to postpone the Avastin treatment today to ensure safety.  Chemotherapy will be proceeded.      She presented for evaluation on 10/02/2024, tolerating chemotherapy except for diarrhea. This has been managed with octreotide. Proceed with cycle 4 chemotherapy today with 5-FU, leucovorin and resumption of bevacizumab.   Reevaluation 10/16/2024 due for cycle 5 5-FU, leucovorin, bevacizumab.  Due to ongoing significant diarrhea, bevacizumab will be placed on hold for potential surgical intervention.  Additionally, 5-FU will be dose reduced to 50%.  Additional adjustments as outlined below    2.  Intractable diarrhea due to chemotherapy.   Patient developed diarrhea on Saturday, 8/24/2024.  Is unclear if this is related to infection as she did have fevers at the time the diarrhea began or chemotherapy.  She does have chronic colitis noted on most recent PET scan, this therefore could be diverticulitis flare  GI panel and C. difficile negative  8/29/2024 she did receive a single dose of octreotide  Begin empiric Flagyl 500 mg 3 times daily x 10 days  8/30/2024 discussed negative stool studies.  We will add Levaquin to already prescribed Flagyl to complete management of diverticulitis.  It is unclear if the patient's symptoms are related to diverticular flare given her previous abdominal pain and fever versus chemotherapy.  However, her symptoms are currently improved  9/4/2024 diarrhea has resolved.  Stools are now more soft with some form but she is having difficulty with passage of stool, having to change positions on the toilet to avoid pain.  We are referring back to Dr. Ye as detailed above.  She will finish out the Flagyl and Levaquin as prescribed having roughly 3 days left of both.  We will also adjust doses of 5-FU/leucovorin today with concern for potential chemotherapy-induced diarrhea.  If she does well this cycle we can increase back to full dose with cycle 3  per Dr. Nguyen.  On 9/18/2024 patient reports that she experienced significant diarrhea during cycle #2 chemotherapy on 09/04/2024, leading to a 30% dose reduction. Despite taking Lomotil 2 tablets four times a day, Imodium, and Pepto-Bismol, her diarrhea persisted. She received octreotide shots twice, which improved her symptoms from watery to mushy stools but did not fully resolve the issue. She will continue with the current regimen and consider adding octreotide to her home regimen if diarrhea starts at home. The potential side effects of octreotide, including nausea, were discussed.   10/1/2024 she reports using octreotide self-injection at home, which has significantly improved her diarrhea. Most of the time, she only requires it once a day and occasionally twice a day, depending on the severity of her diarrhea. She is satisfied with the results, which have improved her quality of life and ability to eat properly.   She will also use Lomotil and the Imodium as needed.  Patient is very tearful in the office today regarding debilitating diarrhea and interference with quality of life.  She questions potential surgical intervention and placement of colostomy due to ongoing pain in her rectum and burning of her skin from diarrhea.  We discussed adjustments today including increased dose of octreotide to 200 mcg 3 times daily for diarrhea.  Chemotherapy dose adjustments.      3.   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  Low-dose Lovenox discontinued and initiated Xarelto 20 mg daily.  Patient with apparent understanding chronic thrombosis of left  innominate vein likely associated with MediPort, was evident per vascular surgery from CT scan in September 2020.  Patient held Xarelto for 2 days prior to MediPort removal from the left chest wall and placement of new port in the right chest wall on 12/18/2020.  She resumed Xarelto that evening.  Progressive swelling in the neck, face, upper extremities prompting hospitalization with CT scan showing thrombosis in the right subclavian vein and SVC.  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  Although she had been off of Xarelto for a few days, clearly Xarelto was not prohibiting progression of her thrombosis after she resumed treatment and there was some evidence to suggest thrombosis had been present at least in the innominate vein on prior scan in September but now appears chronic.  Current acute thrombosis involving SVC and right subclavian.  Patient was admitted and placed on heparin drip  Patient was evaluated by vascular surgery and intervention was not recommended for thrombolysis/thrombectomy.  On 12/22/2020, the patient developed worsening shortness of breath, increasing upper extremity edema consistent with worsening SVC syndrome.  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  Symptoms improved and patient was discharged 12/24/2020 on Lovenox 100 mg every 12 hours.    Returns 12/29/2020 for evaluation continuing Lovenox, overall tolerating it well.  No bleeding issues.  Improved edema 1/5/2021.  Will continue Lovenox weight-based every 12 hours.  On 1/19/2021 and 2/2/2021, patient reports improved facial swelling.  She however does have being bruise on her abdomen wall where she does Lovenox injection.  However she does not have a spontaneous epistaxis, gum bleeding or other bleeding.   On 2/2/2021, we discussed  that side effects of Avastin/biosimilar related to thrombosis.  Since this patient already has been on Lovenox, I think the benefits from treatment for her cancer will outweigh that risk of thrombosis, will proceed ahead with Avastin.  I cautioned patient to watch out for signs of worsening thrombosis patient voiced understanding.   5/11/2021 Lovenox dosing will be adjusted due to patient's weight loss.  We discussed she needs 1 mg/kg.  Her syringes are 100 mg syringes she will do 0.9 mL subcu every 12 hours.  8/3/2021 Patient continues to have bruising on abdomen from lovenox injections.  Will need to monitor weight and adjust dosing in future if further weight loss.   Stable condition on 9/28/2021.  Continue Lovenox anticoagulation.    Patient reports no chest pain no dyspnea no leg edema. However she had bruising and also most recently abnormal small abscess for which she actually went to ER on 11/29/2021, had I&D. The wound is healing. No further drainage. Denies fever sweating or chills. After discussion, she will continue Lovenox injection for now.   On 3/23/2022, patient reports good tolerance of Lovenox.  Nevertheless she still has small quantity of pus in the left lower abdomen abscess, she squeezes it every day to prevent it became worse.  She reports no fever sweating chills.  Recommended to start Augmentin 875 mg twice a day for 7 days.  She will continue Lovenox indefinitely.  On 4/12/2022, patient reports resolution of the small abscess at left lower abdominal wall.   On 5/10/2022, patient continues on Lovenox indefinitely.  No easy bleeding or bruising.  6/23/2022 continuing on Lovenox 1 mg/kg at a dose of 100 mg (patient weight is 96.6 kg today) every 12 hours.  On 1/17/2023, patient reports good tolerance, Lovenox will be continued.    Patient had a venous Doppler study on 02/05/2023, which reported acute left upper extremity DVT in the subclavian vein, axillary and the brachial vein, and also  superficial thrombophlebitis in the basilic upper arm.   On 04/19/2023, patient reports that she was not compliant with anticoagulation at the time of recurrent DVT. She now is fully compliant and is using Lovenox.  Continues on anticoagulation without signs or symptoms of bleeding.  She does have occasional irritation and bleeding with wiping related to frequent diarrhea      4.  Mild anemia.   She also has history of iron deficiency.  Iron studies reveal iron saturation of 10%.  She was started on oral iron but was unable to tolerate due to GI side effects.   Status post Injectafer 2/5/2020 and 2/12/2020   Improved hemoglobin 13.5 on 1/5/2021.  3/16/2020 when hemoglobin now up to 16.2, hematocrit 47.6.  Patient admits that she has started smoking again, and I have encouraged her to quit.  She denies any snoring or sleep apnea diagnosis.  Patient is not taking oral iron.  We will closely monitor.  3/30/2020 hemoglobin 15.7, HCT 47.5.  Patient states that she has cut back significantly on her smoking, although not quit yet.  I have encouraged her to continue working on this.  We will continue to closely monitor.  Hemoglobin 14.6 on 6/8/2021 at the meantime he has worsening macrocytosis .6.    Laboratory studies 6/22/2021 reported excellent folate > 20 ng/mL, however low normal B12 level 357 pg/mL.    On 7/6/2021, normal hemoglobin 15.8, .9 and HCT 47.2%.  Patient was asked to start oral vitamin B12 at 1000 mcg daily.   9/14/2021 hemoglobin 17.0, hematocrit 52.1.  Monitor.  On 9/28/2021 hemoglobin 16.1 hematocrit 48.5%, continue to monitor.   Lab study on 12/14/2021 reported excellent ferritin 296 and iron saturation 25% with free iron 104 TIBC 424. Hemoglobin was 15.2 with .2.   Normal hemoglobin 14.6 on 3/15/2022.  Iron study reported normal ferritin 129.5 ng/mL however slightly low iron saturation 11%.  Continue to monitor for now.  9/4/2024 hemoglobin is normal at 14.0  9/18/2024 hemoglobin  14.9.  Hemoglobin is normal today, 10/16/2024 at 14.0    5.  Peripheral neuropathy secondary to chemotherapy.   This is mild involving bilateral hands and feet as reported on 9/16/2020.   Will avoid chemotherapy with oxaliplatin.  Stable mild neuropathy as of 11/10/2020  Patient reports worsening peripheral neuropathy and cycle #5 chemotherapy on 12/8/2020 with and without irinotecan.   On 1/5/2021, patient reports improvement of peripheral neuropathy, will add irinotecan back to chemotherapy.  Continue to monitor and adjust medication.  Stable.      6.  Depression.  Patient seen by JULIET Davenport on 11/9/2020.  She was started on mirtazapine.  Lexapro was discontinued.  This has definitely improved her mood with the patient feeling overall much better.  She is sleeping better.  Appetite is improved with her actually eating more than she wants to.    Condition is stable.  She plans to continue follow-up with supportive oncology.  Patient is very tearful in the office today regarding need for dose adjustment to chemotherapy due to significant toxicity.    7. Malfunction of the portal catheter  Patient reports there was no blood drawn from the portal catheter. CT scan of the chest on 7/7/2023 reported occlusion of the SVC around to the Port-A-Cath tubing. I recommend trying activase to see if it helps.  Otherwise, we may have to remove the catheter. Clinically, patient has no signs of SVC syndrome.  On 11/03/2023, the patient reports that her Port-A-Cath was able to be used for a CT scan for the injection of intravenous dye; however, there was no blood return, so we needed to draw from her arm for the blood test. We will continue to keep Port-A-Cath for now.  On 02/09/2024, the patient reports that the port was able to be flushed. However, no blood was returned. We will continue to flush every 6 weeks.  9/4/2024 she continues to have no blood return from her port but can taste saline as we flush at each time and  is functioning well otherwise.  Monitor.  10162/2024 no specific problem.      8. Internal hemorrhoids.  She has large internal hemorrhoids diagnosed during a flexible sigmoidoscopy on 09/11/2024. Hydrocortisone cream was prescribed for treatment.       9.  This patient also has strong family history for malignancy   The patient had appointment with genetic counseling on September 3, 2019.  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    10. Hypertension.  Continue management of hypertension and follow up with PCP.      PLAN:    Proceed today with cycle 5 chemotherapy with 5-FU, leucovorin, total of 50% dose reduction to chemotherapy.  Bevacizumab will be held  Continue octreotide though increase dosing to 200 mcg every 8 hours following chemotherapy  Continue Imodium and Lomotil as needed.  She will return in 2 days to discontinue the 5-FU infusion.   Continue Protonix 40 mg daily.   Continue Gas-X.   Continue Magic barrier cream rectum as needed   Continue anticoagulation with Lovenox subcu injection every 12 hours.   A liquid biopsy/Guardant reveal study will be conducted in the end of October to monitor her response together with intermittent imaging studies.  Return in 2 weeks for CBC, CMP, nurse practitioner follow-up and possible cycle 6 chemotherapy.    Pending tolerance to chemotherapy we can consider referral back to Dr. Ye for colostomy as the patient's diarrhea is quite debilitating and negatively impacting her quality of life.    The patient is on a high risk medication requiring close monitoring for toxicity.  Chemotherapy dosing was determined today by Dr. Nguyen    I spent 50 minutes caring for Carole on this date of service. This time includes time spent by me in the following activities: preparing for the visit, reviewing tests, obtaining and/or reviewing a separately obtained history, performing a medically appropriate examination and/or evaluation, counseling and educating the  patient/family/caregiver, ordering medications, tests, or procedures, referring and communicating with other health care professionals, documenting information in the medical record, and care coordination.         Patience Lorenzo, APRN  10/16/2024        CC:  Deepika Vela III NP-C   MD Alverto Bowers MD

## 2024-10-18 ENCOUNTER — INFUSION (OUTPATIENT)
Dept: ONCOLOGY | Facility: HOSPITAL | Age: 44
End: 2024-10-18
Payer: COMMERCIAL

## 2024-10-18 DIAGNOSIS — Z79.899 ENCOUNTER FOR LONG-TERM (CURRENT) USE OF HIGH-RISK MEDICATION: Primary | ICD-10-CM

## 2024-10-18 DIAGNOSIS — Z45.2 ENCOUNTER FOR FITTING AND ADJUSTMENT OF VASCULAR CATHETER: ICD-10-CM

## 2024-10-18 DIAGNOSIS — C78.00 MALIGNANT NEOPLASM METASTATIC TO LUNG, UNSPECIFIED LATERALITY: ICD-10-CM

## 2024-10-18 DIAGNOSIS — C20 ADENOCARCINOMA OF RECTUM: ICD-10-CM

## 2024-10-18 PROCEDURE — 25010000002 HEPARIN LOCK FLUSH PER 10 UNITS: Performed by: INTERNAL MEDICINE

## 2024-10-18 RX ORDER — HEPARIN SODIUM (PORCINE) LOCK FLUSH IV SOLN 100 UNIT/ML 100 UNIT/ML
500 SOLUTION INTRAVENOUS AS NEEDED
Status: DISCONTINUED | OUTPATIENT
Start: 2024-10-18 | End: 2024-10-18 | Stop reason: HOSPADM

## 2024-10-18 RX ORDER — SODIUM CHLORIDE 0.9 % (FLUSH) 0.9 %
10 SYRINGE (ML) INJECTION AS NEEDED
OUTPATIENT
Start: 2024-10-18

## 2024-10-18 RX ORDER — HEPARIN SODIUM (PORCINE) LOCK FLUSH IV SOLN 100 UNIT/ML 100 UNIT/ML
500 SOLUTION INTRAVENOUS AS NEEDED
OUTPATIENT
Start: 2024-10-18

## 2024-10-18 RX ORDER — SODIUM CHLORIDE 0.9 % (FLUSH) 0.9 %
10 SYRINGE (ML) INJECTION AS NEEDED
Status: DISCONTINUED | OUTPATIENT
Start: 2024-10-18 | End: 2024-10-18 | Stop reason: HOSPADM

## 2024-10-18 RX ADMIN — Medication 10 ML: at 10:36

## 2024-10-18 RX ADMIN — Medication 500 UNITS: at 10:36

## 2024-10-22 ENCOUNTER — HOSPITAL ENCOUNTER (EMERGENCY)
Facility: HOSPITAL | Age: 44
Discharge: HOME | End: 2024-10-22
Attending: EMERGENCY MEDICINE | Admitting: EMERGENCY MEDICINE
Payer: COMMERCIAL

## 2024-10-22 ENCOUNTER — APPOINTMENT (OUTPATIENT)
Dept: CT IMAGING | Facility: HOSPITAL | Age: 44
End: 2024-10-22
Payer: COMMERCIAL

## 2024-10-22 VITALS
DIASTOLIC BLOOD PRESSURE: 78 MMHG | HEART RATE: 77 BPM | OXYGEN SATURATION: 94 % | TEMPERATURE: 98.1 F | BODY MASS INDEX: 30.86 KG/M2 | RESPIRATION RATE: 16 BRPM | WEIGHT: 192 LBS | SYSTOLIC BLOOD PRESSURE: 125 MMHG | HEIGHT: 66 IN

## 2024-10-22 DIAGNOSIS — R07.81 PLEURITIC CHEST PAIN: Primary | ICD-10-CM

## 2024-10-22 LAB
ALBUMIN SERPL-MCNC: 3.4 G/DL (ref 3.5–5.2)
ALBUMIN/GLOB SERPL: 1.1 G/DL
ALP SERPL-CCNC: 77 U/L (ref 39–117)
ALT SERPL W P-5'-P-CCNC: 13 U/L (ref 1–33)
ANION GAP SERPL CALCULATED.3IONS-SCNC: 7.7 MMOL/L (ref 5–15)
AST SERPL-CCNC: 9 U/L (ref 1–32)
BASOPHILS # BLD AUTO: 0.01 10*3/MM3 (ref 0–0.2)
BASOPHILS NFR BLD AUTO: 0.2 % (ref 0–1.5)
BILIRUB SERPL-MCNC: <0.2 MG/DL (ref 0–1.2)
BUN SERPL-MCNC: 10 MG/DL (ref 6–20)
BUN/CREAT SERPL: 18.5 (ref 7–25)
CALCIUM SPEC-SCNC: 8.9 MG/DL (ref 8.6–10.5)
CHLORIDE SERPL-SCNC: 107 MMOL/L (ref 98–107)
CO2 SERPL-SCNC: 24.3 MMOL/L (ref 22–29)
CREAT SERPL-MCNC: 0.54 MG/DL (ref 0.57–1)
DEPRECATED RDW RBC AUTO: 49.7 FL (ref 37–54)
EGFRCR SERPLBLD CKD-EPI 2021: 115.9 ML/MIN/1.73
EOSINOPHIL # BLD AUTO: 0.11 10*3/MM3 (ref 0–0.4)
EOSINOPHIL NFR BLD AUTO: 1.7 % (ref 0.3–6.2)
ERYTHROCYTE [DISTWIDTH] IN BLOOD BY AUTOMATED COUNT: 14.7 % (ref 12.3–15.4)
GLOBULIN UR ELPH-MCNC: 3 GM/DL
GLUCOSE SERPL-MCNC: 97 MG/DL (ref 65–99)
HCT VFR BLD AUTO: 42.3 % (ref 34–46.6)
HGB BLD-MCNC: 14.4 G/DL (ref 12–15.9)
IMM GRANULOCYTES # BLD AUTO: 0.02 10*3/MM3 (ref 0–0.05)
IMM GRANULOCYTES NFR BLD AUTO: 0.3 % (ref 0–0.5)
LYMPHOCYTES # BLD AUTO: 1.27 10*3/MM3 (ref 0.7–3.1)
LYMPHOCYTES NFR BLD AUTO: 19.5 % (ref 19.6–45.3)
MCH RBC QN AUTO: 32.1 PG (ref 26.6–33)
MCHC RBC AUTO-ENTMCNC: 34 G/DL (ref 31.5–35.7)
MCV RBC AUTO: 94.4 FL (ref 79–97)
MONOCYTES # BLD AUTO: 0.43 10*3/MM3 (ref 0.1–0.9)
MONOCYTES NFR BLD AUTO: 6.6 % (ref 5–12)
NEUTROPHILS NFR BLD AUTO: 4.66 10*3/MM3 (ref 1.7–7)
NEUTROPHILS NFR BLD AUTO: 71.7 % (ref 42.7–76)
NRBC BLD AUTO-RTO: 0 /100 WBC (ref 0–0.2)
PLATELET # BLD AUTO: 224 10*3/MM3 (ref 140–450)
PMV BLD AUTO: 8.9 FL (ref 6–12)
POTASSIUM SERPL-SCNC: 4.1 MMOL/L (ref 3.5–5.2)
PROT SERPL-MCNC: 6.4 G/DL (ref 6–8.5)
QT INTERVAL: 354 MS
QTC INTERVAL: 424 MS
RBC # BLD AUTO: 4.48 10*6/MM3 (ref 3.77–5.28)
SODIUM SERPL-SCNC: 139 MMOL/L (ref 136–145)
TROPONIN T SERPL HS-MCNC: 11 NG/L
WBC NRBC COR # BLD AUTO: 6.5 10*3/MM3 (ref 3.4–10.8)

## 2024-10-22 PROCEDURE — 99285 EMERGENCY DEPT VISIT HI MDM: CPT

## 2024-10-22 PROCEDURE — 25010000002 ONDANSETRON PER 1 MG: Performed by: EMERGENCY MEDICINE

## 2024-10-22 PROCEDURE — 80053 COMPREHEN METABOLIC PANEL: CPT | Performed by: EMERGENCY MEDICINE

## 2024-10-22 PROCEDURE — 93005 ELECTROCARDIOGRAM TRACING: CPT | Performed by: EMERGENCY MEDICINE

## 2024-10-22 PROCEDURE — 71275 CT ANGIOGRAPHY CHEST: CPT

## 2024-10-22 PROCEDURE — 84484 ASSAY OF TROPONIN QUANT: CPT | Performed by: EMERGENCY MEDICINE

## 2024-10-22 PROCEDURE — 25510000001 IOPAMIDOL PER 1 ML: Performed by: EMERGENCY MEDICINE

## 2024-10-22 PROCEDURE — 96375 TX/PRO/DX INJ NEW DRUG ADDON: CPT

## 2024-10-22 PROCEDURE — 85025 COMPLETE CBC W/AUTO DIFF WBC: CPT | Performed by: EMERGENCY MEDICINE

## 2024-10-22 PROCEDURE — 96374 THER/PROPH/DIAG INJ IV PUSH: CPT

## 2024-10-22 PROCEDURE — 25010000002 HYDROMORPHONE 1 MG/ML SOLUTION: Performed by: EMERGENCY MEDICINE

## 2024-10-22 RX ORDER — IOPAMIDOL 755 MG/ML
100 INJECTION, SOLUTION INTRAVASCULAR
Status: DISCONTINUED | OUTPATIENT
Start: 2024-10-22 | End: 2024-10-22

## 2024-10-22 RX ORDER — ONDANSETRON 2 MG/ML
4 INJECTION INTRAMUSCULAR; INTRAVENOUS ONCE
Status: COMPLETED | OUTPATIENT
Start: 2024-10-22 | End: 2024-10-22

## 2024-10-22 RX ORDER — IOPAMIDOL 755 MG/ML
100 INJECTION, SOLUTION INTRAVASCULAR
Status: COMPLETED | OUTPATIENT
Start: 2024-10-22 | End: 2024-10-22

## 2024-10-22 RX ORDER — SODIUM CHLORIDE 0.9 % (FLUSH) 0.9 %
10 SYRINGE (ML) INJECTION AS NEEDED
Status: DISCONTINUED | OUTPATIENT
Start: 2024-10-22 | End: 2024-10-22 | Stop reason: HOSPADM

## 2024-10-22 RX ADMIN — ONDANSETRON 4 MG: 2 INJECTION, SOLUTION INTRAMUSCULAR; INTRAVENOUS at 05:47

## 2024-10-22 RX ADMIN — HYDROMORPHONE HYDROCHLORIDE 1 MG: 1 INJECTION, SOLUTION INTRAMUSCULAR; INTRAVENOUS; SUBCUTANEOUS at 05:47

## 2024-10-22 RX ADMIN — IOPAMIDOL 95 ML: 755 INJECTION, SOLUTION INTRAVENOUS at 07:13

## 2024-10-22 NOTE — ED PROVIDER NOTES
EMERGENCY DEPARTMENT ENCOUNTER    Room Number:  12/12  PCP: Deepika Vela III NP-C    HPI:  Chief Complaint: Right rib pain  A complete HPI/ROS/PMH/PSH/SH/FH are unobtainable due to: None  Context: Carole Weaver is a 44 y.o. female who presents to the ED c/o acute sharp and severe right rib pain.  Onset about 1 week ago.  Pain is pleuritic in nature.  She thinks it started around the time that she gave her  hug and she thinks that she could have a broken rib.  She is also concerned with the possibility of blood clot.  She denies having any shortness of breath.  No fever.  No abdominal pain.        PAST MEDICAL HISTORY  Active Ambulatory Problems     Diagnosis Date Noted    Anxiety 08/09/2016    Irritable bowel syndrome 08/09/2016    Monopolar depression 05/09/2019    Adenocarcinoma of rectum 07/12/2019    Family history of factor V Leiden mutation 08/01/2019    Heterozygous factor V Leiden mutation 08/02/2019    Encounter for fitting and adjustment of vascular catheter 08/07/2019    Functional diarrhea 09/03/2019    Adverse effect of antineoplastic and immunosuppressive drugs, initial encounter 09/03/2019    Hypokalemia 09/03/2019    Hypomagnesemia 09/03/2019    Other pulmonary embolism without acute cor pulmonale 09/20/2019    Kidney stone on right side 10/07/2019    Hydronephrosis with ureteropelvic junction (UPJ) obstruction 10/15/2019    Partial intestinal obstruction, unspecified as to cause 07/08/2019    Irregular heart beat 12/10/2019    Gastrointestinal hemorrhage, unspecified 07/08/2019    Esophagitis 07/08/2019    Family history of colonic polyps 12/10/2019    Chronic diarrhea 12/10/2019    Calculus of gallbladder 12/10/2019    Benign neoplasm of transverse colon 07/08/2019    Benign neoplasm of rectum and anal canal 07/08/2019    Loss of weight 12/10/2019    Heart murmur 12/10/2019    Anemia 01/22/2020    Anticoagulation adequate 01/22/2020    Iron deficiency 02/05/2020    Adverse  effect of iron 02/05/2020    Drug-induced peripheral neuropathy 04/15/2020    On antineoplastic chemotherapy 04/25/2020    Chemotherapy-induced thrombocytopenia 04/25/2020    HTN (hypertension) 04/25/2020    Chronic anticoagulation 04/25/2020    Chemotherapy-induced neutropenia 04/29/2020    Hand foot syndrome 05/13/2020    Pulmonary nodule, right 05/13/2020    Perirectal abscess 08/12/2020    Pulmonary nodules 09/16/2020    Secondary cancer of lung 10/05/2020    Leukocytopenia 11/10/2020    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    Tachycardia 12/23/2020    History of rectal cancer 05/19/2021    Macrocytosis without anemia 06/22/2021    Right ureteral stone 10/01/2019    Pulmonary embolism without acute cor pulmonale 09/20/2019    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    On anticoagulant therapy     Lump of right breast     Kidney stones 08/09/2007    HPV in female 1998    History of radiation therapy 2019    History of gestational diabetes     History of chemotherapy 2019    History of anemia     Hemorrhoids     GERD (gastroesophageal reflux disease)     Fistula of intestine     Colon polyps     Chemotherapy induced diarrhea 01/2021    Cervical lymphadenitis 06/06/2012    Bilateral epiphora 04/2020    Family history of colon cancer 10/05/2022    Neck pain on left side 02/05/2023    Partial small bowel obstruction 07/22/2023    History of pulmonary embolism 11/18/2023    Coronary artery calcification 07/08/2024    Tobacco abuse 07/08/2024    Encounter for long-term (current) use of high-risk medication 08/19/2024     Resolved Ambulatory Problems     Diagnosis Date Noted    Immunization due 11/08/2018    UTI (urinary tract infection) 09/20/2019    Rectal cancer 11/01/2019    Hematochezia 12/10/2019    Pain associated with surgical procedure 01/29/2020    Ileostomy present 04/25/2020    Rectal cancer 06/19/2020    Rectal cancer 06/19/2020    Abdominopelvic abscess 08/12/2020    Hyponatremia 12/23/2020     Malignant neoplasm of colon 01/15/2021    Dysuria 2021    Urinary urgency 2020    Sepsis due to cellulitis 2002    Sciatica     SAB (spontaneous ) 1996    Right shoulder pain 2020    Rectal cancer 2019    Macrocytosis 2021    Lung cancer 2020    Infected insect bite of neck 2016    Ileostomy in place     IBS (irritable bowel syndrome)     History of TB skin testing 2009    History of pre-eclampsia     Factor V Leiden mutation     Depression     Cystitis 2020    Cystitis 10/27/2020    Chemotherapy-induced nausea 2020    Chemotherapy induced neutropenia 2020    Bilateral hand swelling 2020    Anemia in pregnancy     Abnormal Pap smear of cervix 1998     Past Medical History:   Diagnosis Date    Abscess of abdominal wall 2012    COVID-19 2022    Diversion colitis 2022    Gallstones     Hearing loss     History of transfusion     Low anterior resection syndrome 2022         PAST SURGICAL HISTORY  Past Surgical History:   Procedure Laterality Date    ABDOMINAL SURGERY      CHOLECYSTECTOMY N/A 10/10/2003    LAPAROSCOPIC WITH CHOLANGIOGRAM, DR. JAMEY TALAVERA AT [facility]    COLON RESECTION N/A 2019    Procedure: laparoscopic low anterior colon resection with mobilization of splenic flexure and diverting loop ileostomy: ERAS;  Surgeon: Padmaja Esparza MD;  Location: [facility] OR;  Service: General    COLON RESECTION N/A 2020    Procedure: LOW ANTERIOR COLON RESECTION, COLOANAL ANASTOMOSIS, MOBILIZATION SPLENIC FLEXURE;  Surgeon: Padmaja Esparza MD;  Location: [facility] MAIN OR;  Service: General;  Laterality: N/A;    COLONOSCOPY N/A 07/15/2020    PATENT ANASTAMOSIS IN MID RECTUM, RESCOPE IN 1 YR, DR. PADMAJA ESPARZA AT [facility]    COLONOSCOPY N/A 2022    DIFFUSE AREA OF MODERATELY FRIABLE MUCOSA IN ENTIRE COLON , DIVERSION COLITIS, PATENT AND HEALTHY ANASTAMOSIS, DR. PADMAJA ESPARZA AT [facility]    COLONOSCOPY N/A  12/04/2023    6 MM SESSILE SERRATED ADENOMA POLYP IN DESCENDING, PATENT ANASTAMOSIS IN DISTAL RECTUM, RESCOPE IN 2 YRS, DR. PADMAJA ESPARZA AT [facility]    COLONOSCOPY W/ POLYPECTOMY N/A 07/08/2019    15 MM TUBULOVILLOUS ADENOMA POLYP IN HEPATIC FLEXURE, 20 MMTUBULOVILLOUS ADENOMA WITH HIGH GRADE DYSPLASIA IN RECTOSIGMOID, 4 CM MASS IN MID RECTUM, PATH: INVASIVE MODERATELY DIFFERENTIATED ADENOCARCINOMA, DR. JENNIFER LI AT [facility]    DILATATION AND EVACUATION N/A 2009    ENDOSCOPY N/A 07/08/2019    LA GRADE A ESOPHAGITIS, GASTRITIS, ALL BIOPSIES BENIGN, DR. JENNIFER LI AT [facility]    ILEOSTOMY CLOSURE N/A 11/14/2022    Procedure: ILEOSTOMY TAKEDOWN;  Surgeon: Padmaja Esparza MD;  Location: [facility];  Service: General;  Laterality: N/A;    INCISION AND DRAINAGE PERIRECTAL ABSCESS N/A 08/14/2020    Procedure: INCISION AND DRAINAGE OF retrorectal dehiscence abcess with drain placement and irrigation;  Surgeon: Padmaja Esparza MD;  Location: [facility] OR;  Service: General;  Laterality: N/A;    PAP SMEAR N/A 01/24/2014    SIGMOIDOSCOPY N/A 07/24/2019    INFILTRATIVE PARTIALLY OBSTRUCTING LARGE RECTAL CANCER, AREA TATOOED, DR. PADMAJA ESPARZA AT [facility]    SIGMOIDOSCOPY N/A 11/23/2019    AN ULCERATED PARTIALLY OBSTRUCTING MASS IN MID RECTUM, AREA TATTOOED, DR. PADMAJA ESPARZA AT [facility]    SIGMOIDOSCOPY N/A 07/22/2021    PATENT ANASTAMOSIS IN DISTAL RECTUM, RESCOPE IN 1 YR, DR. PADMAJA ESPARZA AT [facility]    SIGMOIDOSCOPY N/A 09/11/2024    PATCHY INFLAMMATION AND EDEMA IN DESCENDING, AREA BIOPSIED AND WAS BENIGN, PATENT AND HEALTHY ANASTAMOSIS, HEMORRHOIDS,RESCOPE(CY) 12/2025, DR. PADMAJA ESPARZA AT [facility]    TRANSRECTAL ULTRASOUND N/A 07/24/2019    Procedure: ULTRASOUND TRANSRECTAL;  Surgeon: Padmaja Esparza MD;  Location: [facility] ENDOSCOPY;  Service: General    URETEROSCOPY LASER LITHOTRIPSY WITH STENT INSERTION Right 10/30/2019    DR. ESTUARDO BEASLEY AT [place]    VAGINAL DELIVERY N/A 09/18/1998    VENOUS ACCESS  DEVICE (PORT) INSERTION Left 08/09/2019    Procedure: INSERTION VENOUS ACCESS DEVICE;  Surgeon: Sj Joseph MD;  Location: [facility] OR [facility];  Service: General    VENOUS ACCESS DEVICE (PORT) INSERTION N/A 12/18/2020    Procedure: INSERTION VENOUS ACCESS DEVICE right side, removal venous access device left side;  Surgeon: Sj Joseph MD;  Location: [facility] OR [facility];  Service: General;  Laterality: N/A;    WISDOM TOOTH EXTRACTION Bilateral 1993         FAMILY HISTORY  Family History   Problem Relation Age of Onset    Hyperlipidemia Mother     Colon polyps Mother     Heart disease Mother     Hypertension Mother     Factor V Leiden deficiency Mother     Skin cancer Father         Squamous in 60s    Atrial fibrillation Father     Drug abuse Sister     Mental illness Sister         Addiction    No Known Problems Son     Colon cancer Maternal Uncle     Cancer Paternal Uncle     Colon cancer Paternal Uncle     Diabetes Paternal Uncle     Heart disease Paternal Grandfather     Cancer Paternal Aunt         OVARIAN    Malig Hyperthermia Neg Hx          SOCIAL HISTORY  Social History     Socioeconomic History    Marital status:      Spouse name: Justus    Number of children: 1    Years of education: College   Tobacco Use    Smoking status: Every Day     Current packs/day: 1.00     Average packs/day: 1 pack/day for 25.0 years (25.0 ttl pk-yrs)     Types: Cigarettes     Passive exposure: Current    Smokeless tobacco: Never    Tobacco comments:     1 PPD/caffeine use    Vaping Use    Vaping status: Some Days    Substances: Nicotine    Devices: Disposable    Passive vaping exposure: Yes   Substance and Sexual Activity    Alcohol use: Not Currently     Comment: RARELY    Drug use: Never    Sexual activity: Yes     Partners: Male     Comment: .         ALLERGIES  Patient has no known allergies.        REVIEW OF SYSTEMS  Review of Systems     Included in HPI  All systems reviewed and negative except for those  discussed in HPI.       PHYSICAL EXAM  ED Triage Vitals   Temp Heart Rate Resp BP SpO2   10/22/24 0518 10/22/24 0518 10/22/24 0518 10/22/24 0524 10/22/24 0518   98.1 °F (36.7 °C) 91 16 137/97 100 %      Temp src Heart Rate Source Patient Position BP Location FiO2 (%)   10/22/24 0518 10/22/24 0518 -- -- --   Tympanic Monitor          Physical Exam      GENERAL: no acute distress  HENT: nares patent  EYES: no scleral icterus  CV: regular rhythm, normal rate  RESPIRATORY: normal effort, clear to auscultation bilaterally  ABDOMEN: soft, nontender  MUSCULOSKELETAL: no deformity, reproducible chest wall tenderness to the right lower ribs  NEURO: alert, moves all extremities, follows commands  PSYCH:  calm, cooperative  SKIN: warm, dry    Vital signs and nursing notes reviewed.          LAB RESULTS  Recent Results (from the past 24 hours)   Comprehensive Metabolic Panel    Collection Time: 10/22/24  5:56 AM    Specimen: Arm, Left; Blood   Result Value Ref Range    Glucose 97 65 - 99 mg/dL    BUN 10 6 - 20 mg/dL    Creatinine 0.54 (L) 0.57 - 1.00 mg/dL    Sodium 139 136 - 145 mmol/L    Potassium 4.1 3.5 - 5.2 mmol/L    Chloride 107 98 - 107 mmol/L    CO2 24.3 22.0 - 29.0 mmol/L    Calcium 8.9 8.6 - 10.5 mg/dL    Total Protein 6.4 6.0 - 8.5 g/dL    Albumin 3.4 (L) 3.5 - 5.2 g/dL    ALT (SGPT) 13 1 - 33 U/L    AST (SGOT) 9 1 - 32 U/L    Alkaline Phosphatase 77 39 - 117 U/L    Total Bilirubin <0.2 0.0 - 1.2 mg/dL    Globulin 3.0 gm/dL    A/G Ratio 1.1 g/dL    BUN/Creatinine Ratio 18.5 7.0 - 25.0    Anion Gap 7.7 5.0 - 15.0 mmol/L    eGFR 115.9 >60.0 mL/min/1.73   Single High Sensitivity Troponin T    Collection Time: 10/22/24  5:56 AM    Specimen: Arm, Left; Blood   Result Value Ref Range    HS Troponin T 11 <14 ng/L   CBC Auto Differential    Collection Time: 10/22/24  5:56 AM    Specimen: Arm, Left; Blood   Result Value Ref Range    WBC 6.50 3.40 - 10.80 10*3/mm3    RBC 4.48 3.77 - 5.28 10*6/mm3    Hemoglobin 14.4 12.0 -  15.9 g/dL    Hematocrit 42.3 34.0 - 46.6 %    MCV 94.4 79.0 - 97.0 fL    MCH 32.1 26.6 - 33.0 pg    MCHC 34.0 31.5 - 35.7 g/dL    RDW 14.7 12.3 - 15.4 %    RDW-SD 49.7 37.0 - 54.0 fl    MPV 8.9 6.0 - 12.0 fL    Platelets 224 140 - 450 10*3/mm3    Neutrophil % 71.7 42.7 - 76.0 %    Lymphocyte % 19.5 (L) 19.6 - 45.3 %    Monocyte % 6.6 5.0 - 12.0 %    Eosinophil % 1.7 0.3 - 6.2 %    Basophil % 0.2 0.0 - 1.5 %    Immature Grans % 0.3 0.0 - 0.5 %    Neutrophils, Absolute 4.66 1.70 - 7.00 10*3/mm3    Lymphocytes, Absolute 1.27 0.70 - 3.10 10*3/mm3    Monocytes, Absolute 0.43 0.10 - 0.90 10*3/mm3    Eosinophils, Absolute 0.11 0.00 - 0.40 10*3/mm3    Basophils, Absolute 0.01 0.00 - 0.20 10*3/mm3    Immature Grans, Absolute 0.02 0.00 - 0.05 10*3/mm3    nRBC 0.0 0.0 - 0.2 /100 WBC   ECG 12 Lead Chest Pain    Collection Time: 10/22/24  5:58 AM   Result Value Ref Range    QT Interval 354 ms    QTC Interval 424 ms       Ordered the above labs and reviewed the results.        RADIOLOGY  CT Angiogram Chest    Result Date: 10/22/2024  CT ANGIOGRAM CHEST-  DATE OF EXAM: 10/22/2024 6:57 AM  INDICATION: Right lower rib and chest pain. Stage IV colon cancer.  COMPARISON: PET/CT 8/14/2024. CT chest 8/2/2024, 4/23/2024, 2/2/2024, 10/27/2023, and 12/23/2020. PET/CT 8/14/2024, 11/8/2023, and 9/24/2021.  TECHNIQUE: Multiple contiguous axial images were acquired through the chest following the intravenous administration of 95 mL of Isovue-370. Reformatted coronal, sagittal, and 3D reconstruction images were also reviewed. Radiation dose reduction techniques were utilized, including automated exposure control and exposure modulation based on body size.  FINDINGS: The study is mild limited by technique. Evaluation of the distal segmental and subsegmental pulmonary arterial branches is limited. No evidence of a pulm arterial filling defect in the main or proximal segmental pulmonary arterial branches to suggest pulmonary embolism. Dense venous  contrast in multiple right chest wall and upper abdominal wall collaterals with severe stenosis or occlusion of the subclavian veins and confluence of the SVC, likely chronic. The main pulmonary artery is normal in size. The heart is normal in size. New small to moderate pericardial effusion measuring up to 12 mm in thickness. Mild calcified coronary artery disease. No pathologically enlarged intrathoracic lymph nodes are identified.  The pulmonary nodule in the posterior right upper lobe has decreased in size and now measures sub-6 mm in average diameter (axial series 5 image 60). Similar-appearing sub-6 mm pulmonary nodule in the anterior right upper lobe with adjacent subsegmental atelectasis and/or scarring (axial series 5 image 56). Stable sub-6 mm pulmonary nodules in the left upper lobe with adjacent subsegmental atelectasis and/or scarring (axial series 5 images 57 and 68). Mild multifocal bibasilar subsegmental atelectasis and/or scarring. No new focal lung consolidation. No new or enlarging pulmonary nodules identified. The central airways are widely patent. No pneumothorax or significant pleural effusion.  Limited imaging of the upper abdomen demonstrates postoperative changes from cholecystectomy.  Mild multilevel mid and lower thoracic spondylosis. No acute osseous abnormality or concerning osseous lesion.       1. Mildly limited study negative for pulmonary embolism in the main or proximal segmental pulmonary arterial branches. Evaluation of the more distal segmental and subsegmental pulmonary arterial branches is limited by technique. No evidence of right heart strain. 2. Severe stenosis or occlusion of the subclavian veins and confluence of the SVC with dense contrast in right chest wall and upper abdominal wall collaterals. 3. New small to moderate pericardial effusion. 4. Sub-6 mm pulmonary nodules in both lungs with adjacent likely treatment related subsegmental atelectasis and/or scarring in the  right upper lobe and left upper lobe. The pulmonary nodule in the posterior right upper lobe has decreased in size in the interim.  This report was finalized on 10/22/2024 8:12 AM by Yoav Abbott MD on Workstation: [id number]       Ordered the above noted radiological studies. Reviewed by me in PACS.        MEDICATIONS GIVEN IN ER  Medications   sodium chloride 0.9 % flush 10 mL (has no administration in time range)   HYDROmorphone (DILAUDID) injection 1 mg (1 mg Intravenous Given 10/22/24 0547)   ondansetron (ZOFRAN) injection 4 mg (4 mg Intravenous Given 10/22/24 0547)   iopamidol (ISOVUE-370) 76 % injection 100 mL (95 mL Intravenous Given 10/22/24 0713)         ORDERS PLACED DURING THIS VISIT:  Orders Placed This Encounter   Procedures    CT Angiogram Chest    Comprehensive Metabolic Panel    Single High Sensitivity Troponin T    CBC Auto Differential    Monitor Blood Pressure    Continuous Pulse Oximetry    LCG (on-call MD unless specified)    ECG 12 Lead Chest Pain    Insert Peripheral IV    CBC & Differential         OUTPATIENT MEDICATION MANAGEMENT:  Current Facility-Administered Medications Ordered in Epic   Medication Dose Route Frequency Provider Last Rate Last Admin    sodium chloride 0.9 % flush 10 mL  10 mL Intravenous PRN Gonzalo Rogers MD         Current Outpatient Medications Ordered in Epic   Medication Sig Dispense Refill    bismuth subsalicylate (PEPTO BISMOL) 262 MG/15ML suspension Take 15 mL by mouth 4 (Four) Times a Day.      clonazePAM (KlonoPIN) 1 MG tablet Take 1 tablet as needed daily for anxiety. May take one additional as needed for severe anxiety. 45 tablet 3    Cyanocobalamin (VITAMIN B-12 PO) Take 1 tablet by mouth 3 (Three) Times a Week. M-W-F      dicyclomine (BENTYL) 20 MG tablet TAKE 1 TABLET BY MOUTH EVERY 6 (SIX) HOURS AS NEEDED FOR IRRITABLE BOWEL SYMPTOMS (Patient taking differently: Take 1 tablet by mouth Every Evening.) 360 tablet 2    diphenoxylate-atropine  (LOMOTIL) 2.5-0.025 MG per tablet TAKE 2 TABLETS BY MOUTH 4 TIMES A  tablet 11    Enoxaparin Sodium (LOVENOX) 100 MG/ML solution prefilled syringe syringe INJECT 1 ML UNDER THE SKIN INTO THE APPROPRIATE AREA AS DIRECTED EVERY 12 (TWELVE) HOURS. 60 mL 2    escitalopram (LEXAPRO) 10 MG tablet Take 1 tablet by mouth Daily. (Patient taking differently: Take 1 tablet by mouth Every Evening.) 90 tablet 1    famotidine (PEPCID) 20 MG tablet TAKE 1 TABLET BY MOUTH TWICE A DAY (Patient taking differently: Take 1 tablet by mouth Every Evening.) 180 tablet 2    HYDROcodone-acetaminophen (NORCO) 5-325 MG per tablet Take 1 tablet by mouth Every 6 (Six) Hours As Needed for Moderate Pain. 45 tablet 0    Loperamide HCl (IMODIUM PO) Take  by mouth As Needed.      Loratadine 10 MG capsule Take 1 capsule by mouth Every Evening.      metoprolol succinate XL (TOPROL-XL) 25 MG 24 hr tablet TAKE 0.5 TABLETS BY MOUTH EVERY NIGHT 45 tablet 2    octreotide (sandoSTATIN) 100 MCG/ML injection Inject 1 mL under the skin into the appropriate area as directed 3 (Three) Times a Day. 84 mL 1    ondansetron (ZOFRAN) 8 MG tablet TAKE 1 TABLET BY MOUTH THREE TIMES A DAY AS NEEDED FOR NAUSEA AND VOMITING 60 tablet 1    pantoprazole (Protonix) 40 MG EC tablet Take 1 tablet by mouth Daily. 90 tablet 1    rosuvastatin (CRESTOR) 5 MG tablet TAKE 1 TABLET BY MOUTH EVERY DAY 90 tablet 0    hydrocortisone 2.5 % cream APPLY RECTALLY 3 TIMES DAILY. INCLUDE APPLICATOR.      Hydrocortisone, Perianal, (Anusol-HC) 2.5 % rectal cream Apply rectally 3 times daily.  Include applicator. 30 g 1    Imvexxy Maintenance Pack 10 MCG insert Insert 10 mcg into the vagina 2 (Two) Times a Week.      nystatin-hydrocortisone-bacitracin in zinc oxide ointment Apply  topically to the appropriate area as directed Daily. 60 g 2       PROCEDURES  Procedures          MEDICAL DECISION MAKING, PROGRESS, and CONSULTS    Discussion below represents my analysis of pertinent findings  related to patient's condition, differential diagnosis, treatment plan and final disposition.      Additional sources:  - Discussed/obtained information from independent historians:   Additional information was obtained to confirm the patient's history.          Differential diagnosis:    Pulmonary embolism, pneumonia, rib fracture, costochondritis, hepatobiliary pathology           After initial evaluation, I considered the need for admission due to the stability of PE refractory to Lovenox injections.      Independent interpretation of labs, radiology studies, and discussions with consultants:  ED Course as of 10/22/24 0914   Tue Oct 22, 2024   0630 First look: Patient with history of colon cancer and factor V Leiden presents with worsening pain to right lower ribs.  States that the pain is very positional worse with deep breaths.  Patient is concerned for blood clot or fractured ribs. [TJ]   0714 EKG independently interpreted by myself.  Time 5:58 AM.  Sinus rhythm.  Heart rate 86.  Normal axis.  No acute ST abnormality. [TD]   0750 CT angiogram of the chest independently interpreted by myself.  I see no evidence of pulmonary embolism. [TD]   0821 CT angiogram is mildly limited study for PE per radiology.  Severe stenosis or occlusion of the right subclavian veins with confluence of the SVC with dense contrast in the right chest wall and upper abdominal wall collaterals.  New small to moderate pericardial effusion.  Sub-6 mm pulmonary nodules in both lungs [TD]   0823 On medical chart review, reviewed the most recent progress note from Jaison Nguyen with oncology from 10/2/2024.  Denies metastatic rectal cancer.  She is currently undergoing palliative chemotherapy. [TD]   0910 Discussed the case with Dr. Bustos, cardiology.  I do not believe that the patient's symptoms are consistent with pericarditis.  She reviewed the imaging.  This is a small pericardial effusion.  She was comfortable with the patient  being discharged home with plan for follow-up with the cardiology team within the next month.  I related to the patient and she is very comfortable with this plan. [TD]      ED Course User Index  [TD] Gonzalo Gilman II, MD  [TJ] Gonzalo Rogers MD       Patient feels that her pain is appropriately controlled.  She states that she has known significant collaterals to her SVC and subclavian veins.  She has been previously diagnosed with SVC syndrome.  This radiographic finding on today's CT not consistent with her presenting complaint today.    I suspect her pain is most likely related to costochondritis.      DIAGNOSIS  Final diagnoses:   Pleuritic chest pain         DISPOSITION  DISCHARGE    FOLLOW-UP  Deepika Vela III, NP-C  [address]  [address]  [place] [id number] [phone]    Schedule an appointment as soon as possible for a visit   As needed    [facility] EMERGENCY DEPARTMENT  [address] [id number] [phone]  Go to   If symptoms worsen    Tasha Bustos MD  [address]  [place] [id number]  [phone]    Schedule an appointment as soon as possible for a visit in 1 month           Medication List        Changed      dicyclomine 20 MG tablet  Commonly known as: BENTYL  TAKE 1 TABLET BY MOUTH EVERY 6 (SIX) HOURS AS NEEDED FOR IRRITABLE BOWEL SYMPTOMS  What changed: See the new instructions.     escitalopram 10 MG tablet  Commonly known as: LEXAPRO  Take 1 tablet by mouth Daily.  What changed: when to take this     famotidine 20 MG tablet  Commonly known as: PEPCID  TAKE 1 TABLET BY MOUTH TWICE A DAY  What changed: when to take this                  Latest Documented Vital Signs:  As of 09:14 EDT  BP- 125/78 HR- 77 Temp- 98.1 °F (36.7 °C) (Tympanic) O2 sat- 94%      --    Please note that portions of this were completed with a voice recognition program.       Note Disclaimer: At [facility], we believe  that sharing information builds trust and better relationships. You are receiving this note because you are receiving care at [facility] or recently visited. It is possible you will see health information before a provider has talked with you about it. This kind of information can be easy to misunderstand. To help you fully understand what it means for your health, we urge you to discuss this note with your provider.         Gonzalo Gilman II, MD  10/22/24 0916

## 2024-10-23 RX ORDER — OCTREOTIDE ACETATE 100 UG/ML
200 INJECTION, SOLUTION INTRAVENOUS; SUBCUTANEOUS EVERY 8 HOURS SCHEDULED
Qty: 168 ML | Refills: 1 | Status: SHIPPED | OUTPATIENT
Start: 2024-10-23

## 2024-10-30 ENCOUNTER — OFFICE VISIT (OUTPATIENT)
Dept: ONCOLOGY | Facility: CLINIC | Age: 44
End: 2024-10-30
Payer: COMMERCIAL

## 2024-10-30 ENCOUNTER — INFUSION (OUTPATIENT)
Dept: ONCOLOGY | Facility: HOSPITAL | Age: 44
End: 2024-10-30
Payer: COMMERCIAL

## 2024-10-30 ENCOUNTER — LAB (OUTPATIENT)
Dept: LAB | Facility: HOSPITAL | Age: 44
End: 2024-10-30
Payer: COMMERCIAL

## 2024-10-30 VITALS
OXYGEN SATURATION: 96 % | SYSTOLIC BLOOD PRESSURE: 93 MMHG | WEIGHT: 190.6 LBS | HEIGHT: 66 IN | BODY MASS INDEX: 30.63 KG/M2 | DIASTOLIC BLOOD PRESSURE: 66 MMHG | HEART RATE: 75 BPM | TEMPERATURE: 97.8 F

## 2024-10-30 DIAGNOSIS — Z79.899 ENCOUNTER FOR LONG-TERM (CURRENT) USE OF HIGH-RISK MEDICATION: ICD-10-CM

## 2024-10-30 DIAGNOSIS — C20 ADENOCARCINOMA OF RECTUM: ICD-10-CM

## 2024-10-30 DIAGNOSIS — K52.1 CHEMOTHERAPY INDUCED DIARRHEA: ICD-10-CM

## 2024-10-30 DIAGNOSIS — C78.00 MALIGNANT NEOPLASM METASTATIC TO LUNG, UNSPECIFIED LATERALITY: ICD-10-CM

## 2024-10-30 DIAGNOSIS — Z79.899 HIGH RISK MEDICATION USE: ICD-10-CM

## 2024-10-30 DIAGNOSIS — G89.3 CANCER ASSOCIATED PAIN: ICD-10-CM

## 2024-10-30 DIAGNOSIS — C20 ADENOCARCINOMA OF RECTUM: Primary | ICD-10-CM

## 2024-10-30 DIAGNOSIS — Z45.2 FITTING AND ADJUSTMENT OF VASCULAR CATHETER: ICD-10-CM

## 2024-10-30 DIAGNOSIS — D68.51 HETEROZYGOUS FACTOR V LEIDEN MUTATION: ICD-10-CM

## 2024-10-30 DIAGNOSIS — Z79.899 ENCOUNTER FOR LONG-TERM (CURRENT) USE OF HIGH-RISK MEDICATION: Primary | ICD-10-CM

## 2024-10-30 DIAGNOSIS — T45.1X5A CHEMOTHERAPY INDUCED DIARRHEA: ICD-10-CM

## 2024-10-30 LAB
ALBUMIN SERPL-MCNC: 3.6 G/DL (ref 3.5–5.2)
ALBUMIN/GLOB SERPL: 1.5 G/DL
ALP SERPL-CCNC: 82 U/L (ref 39–117)
ALT SERPL W P-5'-P-CCNC: 13 U/L (ref 1–33)
ANION GAP SERPL CALCULATED.3IONS-SCNC: 13.2 MMOL/L (ref 5–15)
AST SERPL-CCNC: 14 U/L (ref 1–32)
BASOPHILS # BLD AUTO: 0.03 10*3/MM3 (ref 0–0.2)
BASOPHILS NFR BLD AUTO: 0.5 % (ref 0–1.5)
BILIRUB SERPL-MCNC: 0.2 MG/DL (ref 0–1.2)
BUN SERPL-MCNC: 7 MG/DL (ref 6–20)
BUN/CREAT SERPL: 10.4 (ref 7–25)
CALCIUM SPEC-SCNC: 8.6 MG/DL (ref 8.6–10.5)
CHLORIDE SERPL-SCNC: 109 MMOL/L (ref 98–107)
CO2 SERPL-SCNC: 22.8 MMOL/L (ref 22–29)
CREAT SERPL-MCNC: 0.67 MG/DL (ref 0.57–1)
DEPRECATED RDW RBC AUTO: 58.7 FL (ref 37–54)
EGFRCR SERPLBLD CKD-EPI 2021: 110 ML/MIN/1.73
EOSINOPHIL # BLD AUTO: 0.17 10*3/MM3 (ref 0–0.4)
EOSINOPHIL NFR BLD AUTO: 2.6 % (ref 0.3–6.2)
ERYTHROCYTE [DISTWIDTH] IN BLOOD BY AUTOMATED COUNT: 16 % (ref 12.3–15.4)
GLOBULIN UR ELPH-MCNC: 2.4 GM/DL
GLUCOSE SERPL-MCNC: 120 MG/DL (ref 65–99)
HCT VFR BLD AUTO: 42.7 % (ref 34–46.6)
HGB BLD-MCNC: 13.8 G/DL (ref 12–15.9)
IMM GRANULOCYTES # BLD AUTO: 0.02 10*3/MM3 (ref 0–0.05)
IMM GRANULOCYTES NFR BLD AUTO: 0.3 % (ref 0–0.5)
LYMPHOCYTES # BLD AUTO: 1.27 10*3/MM3 (ref 0.7–3.1)
LYMPHOCYTES NFR BLD AUTO: 19.5 % (ref 19.6–45.3)
MCH RBC QN AUTO: 32.2 PG (ref 26.6–33)
MCHC RBC AUTO-ENTMCNC: 32.3 G/DL (ref 31.5–35.7)
MCV RBC AUTO: 99.8 FL (ref 79–97)
MONOCYTES # BLD AUTO: 0.51 10*3/MM3 (ref 0.1–0.9)
MONOCYTES NFR BLD AUTO: 7.8 % (ref 5–12)
NEUTROPHILS NFR BLD AUTO: 4.51 10*3/MM3 (ref 1.7–7)
NEUTROPHILS NFR BLD AUTO: 69.3 % (ref 42.7–76)
NRBC BLD AUTO-RTO: 0 /100 WBC (ref 0–0.2)
PLATELET # BLD AUTO: 178 10*3/MM3 (ref 140–450)
PMV BLD AUTO: 9 FL (ref 6–12)
POTASSIUM SERPL-SCNC: 3.5 MMOL/L (ref 3.5–5.2)
PROT SERPL-MCNC: 6 G/DL (ref 6–8.5)
RBC # BLD AUTO: 4.28 10*6/MM3 (ref 3.77–5.28)
SODIUM SERPL-SCNC: 145 MMOL/L (ref 136–145)
WBC NRBC COR # BLD AUTO: 6.51 10*3/MM3 (ref 3.4–10.8)

## 2024-10-30 PROCEDURE — 96365 THER/PROPH/DIAG IV INF INIT: CPT

## 2024-10-30 PROCEDURE — 96367 TX/PROPH/DG ADDL SEQ IV INF: CPT

## 2024-10-30 PROCEDURE — 25010000002 FLUOROURACIL PER 500 MG: Performed by: NURSE PRACTITIONER

## 2024-10-30 PROCEDURE — 25810000003 SODIUM CHLORIDE 0.9 % SOLUTION: Performed by: NURSE PRACTITIONER

## 2024-10-30 PROCEDURE — 25010000002 PALONOSETRON PER 25 MCG: Performed by: NURSE PRACTITIONER

## 2024-10-30 PROCEDURE — 36415 COLL VENOUS BLD VENIPUNCTURE: CPT

## 2024-10-30 PROCEDURE — 25810000003 SODIUM CHLORIDE 0.9 % SOLUTION 250 ML FLEX CONT: Performed by: NURSE PRACTITIONER

## 2024-10-30 PROCEDURE — 96375 TX/PRO/DX INJ NEW DRUG ADDON: CPT

## 2024-10-30 PROCEDURE — 85025 COMPLETE CBC W/AUTO DIFF WBC: CPT

## 2024-10-30 PROCEDURE — 25010000002 DEXAMETHASONE SODIUM PHOSPHATE 100 MG/10ML SOLUTION: Performed by: NURSE PRACTITIONER

## 2024-10-30 PROCEDURE — 25010000002 LEUCOVORIN CALCIUM PER 50 MG: Performed by: NURSE PRACTITIONER

## 2024-10-30 PROCEDURE — 96366 THER/PROPH/DIAG IV INF ADDON: CPT

## 2024-10-30 PROCEDURE — 25010000002 FOSAPREPITANT PER 1 MG: Performed by: NURSE PRACTITIONER

## 2024-10-30 PROCEDURE — 80053 COMPREHEN METABOLIC PANEL: CPT

## 2024-10-30 PROCEDURE — G0498 CHEMO EXTEND IV INFUS W/PUMP: HCPCS

## 2024-10-30 RX ORDER — NYSTATIN 100000 U/G
1 CREAM TOPICAL 2 TIMES DAILY
Qty: 30 G | Refills: 2 | Status: SHIPPED | OUTPATIENT
Start: 2024-10-30

## 2024-10-30 RX ORDER — SODIUM CHLORIDE 9 MG/ML
20 INJECTION, SOLUTION INTRAVENOUS ONCE
Status: CANCELLED | OUTPATIENT
Start: 2024-10-30

## 2024-10-30 RX ORDER — PALONOSETRON 0.05 MG/ML
0.25 INJECTION, SOLUTION INTRAVENOUS ONCE
Status: CANCELLED | OUTPATIENT
Start: 2024-10-30

## 2024-10-30 RX ORDER — SODIUM CHLORIDE 9 MG/ML
20 INJECTION, SOLUTION INTRAVENOUS ONCE
Status: COMPLETED | OUTPATIENT
Start: 2024-10-30 | End: 2024-10-30

## 2024-10-30 RX ORDER — PALONOSETRON 0.05 MG/ML
0.25 INJECTION, SOLUTION INTRAVENOUS ONCE
Status: COMPLETED | OUTPATIENT
Start: 2024-10-30 | End: 2024-10-30

## 2024-10-30 RX ORDER — SODIUM CHLORIDE 9 MG/ML
1000 INJECTION, SOLUTION INTRAVENOUS ONCE
Status: CANCELLED | OUTPATIENT
Start: 2024-11-02

## 2024-10-30 RX ADMIN — FOSAPREPITANT 100 ML: 150 INJECTION, POWDER, LYOPHILIZED, FOR SOLUTION INTRAVENOUS at 11:34

## 2024-10-30 RX ADMIN — SODIUM CHLORIDE 20 ML/HR: 9 INJECTION, SOLUTION INTRAVENOUS at 11:10

## 2024-10-30 RX ADMIN — PALONOSETRON HYDROCHLORIDE 0.25 MG: 0.25 INJECTION INTRAVENOUS at 11:15

## 2024-10-30 RX ADMIN — FLUOROURACIL 2880 MG: 50 INJECTION, SOLUTION INTRAVENOUS at 12:36

## 2024-10-30 RX ADMIN — LEUCOVORIN CALCIUM 560 MG: 350 INJECTION, POWDER, LYOPHILIZED, FOR SUSPENSION INTRAMUSCULAR; INTRAVENOUS at 12:04

## 2024-10-30 RX ADMIN — DEXAMETHASONE SODIUM PHOSPHATE 12 MG: 10 INJECTION, SOLUTION INTRAMUSCULAR; INTRAVENOUS at 11:16

## 2024-10-30 NOTE — PROGRESS NOTES
REASON FOR FOLLOW UP:     Rectal cancer, rectal ultrasound examination in July 2019 reported T3N0 disease without lymphadenopathy. She does have small pulmonary nodule 6-7 mm and 2 mm with indeterminate feature. There is no solid evidence of metastatic disease otherwise. Patient has stage IIA (T3N0M0) disease.  The patient is heterozygous factor V Leiden, was on prophylactic anticoagulation with Lovenox 40 mg daily given her increased risk of thrombosis with MediPort and GI malignancy.   PET scan on 8/8/2019 reported an 8 mm hypermetabolic right upper lobe pulmonary nodule, which is suspicious for metastatic as well.    Patient had a PowerPort placement on the left upper chest by Dr. Joseph on 8/9/2019.  Patient was started on concurrent infusional 5-FU chemoradiation therapy on 8/12/2019, with planned complete surgical resection and further adjuvant chemotherapy with intention to cure the disease.   Patient underwent surgical resection of the primary rectal cancer by Dr. Ye on 12/2/2019.  Pathology evaluation reported residual T3N1 disease stage IIIb.  Diarrhea related to therapy and radiation.   Patient was started cycle 1 day 1 of adjuvant FOLFOX 6 on 1/23/2020.  On 2/5/2020 FOLFOX 6 cycle 2 delayed secondary to neutropenia.  Patient was given 3 days of Granix injection.  After cycle #2 we planned 3 days of Granix with each cycle.   Patient also intolerant of oral iron.  Patient received 2 doses of IV injectafer on 02/05/2020 and 02/12/2020.   02/12/2020 Proceed with cycle #2 of FOLFOX 6 with 3 days of Granix.  On 3/11/2020 cycle 4 postponed secondary to abdominal pain and occasional low-grade fevers.  CT scans ordered.  Cycle #4 resumed after CT scan revealed no evidence of disease.  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 Ye. patient has no fever.  Continue to observe.   Grade 3 hand-foot syndrome secondary to 5-FU after cycle #7  FOLFOX 6 chemotherapy, prompting ER visit.  Also has worsening peripheral neuropathy.   Cycle #8 FOLFOX 6 was given on 5/13/2020, with 50% dose reduction for both 5-FU and oxaliplatin, and also examination of bolus 5-FU.   PET scan examination on 5/21/2020 reported no evidence of metastatic disease in the chest abdomen pelvis.  Stable 6 mm RUL pulmonary nodule.  On 5/27/2020, I discussed with the patient that we can discontinue chemotherapy at this time.   Patient had a surgical procedure for low anterior colon resection, coloanal anastomosis on 8/3/2020.  CT scan for chest abdomen pelvis on 9/9/2020 reported a new 8 mm noncalcified pulmonary nodule in the left lower lobe of the lung.  Otherwise stable right upper lobe 6 mm pulmonary nodule, and no evidence of disease recurrence in the abdomen or pelvis.  PET scan examination on 9/18/2020 reported multiple hypermetabolic small pulmonary nodules/ new pulmonary nodules.   Patient was started on pelvic chemotherapy with FOLFIRI regimen on 10/13/2020.   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 It reported tumor mutation burden 5 Muts/Mb, microsatellite stable, TP53 mutation [id number], and others.   Cycle #5 was without irinotecan, due to peripheral neuropathy.  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  Patient was switched to weight-based Lovenox injection.  Cycle #6 5-FU and irinotecan was delayed by 2 weeks because of the above incident.  Patient had a telemedicine evaluation at that the [facility] [place] in February 2021.  They agreed with our treatment plan for palliative chemotherapy followed by maintenance chemotherapy.    PET scan examination on 4/6/2021 after cycle #12 palliative chemotherapy reported no evidence of hypermetabolic metastatic lesion.   Patient was started on maintenance chemotherapy with 5-FU and  Avastin on 4/13/2021. (Unable to tolerate Xeloda because of a significant hand-foot syndrome).   PET scan on 9/24/2021 obtained after cycle #12 maintenance chemotherapy with 5-FU, leucovorin, Avastin reported no evidence for active disease. We recommended 3 months chemotherapy holiday.  CT scan examination on 3/15/2022 reported slightly disease progression with increase in size of small pulmonary nodule.  We started patient back on maintenance chemotherapy on 3/29/2022 treatment with 5-FU plus leucovorin and Avastin, repeating every 2 weeks.  After 6 more maintenance chemotherapy, CT scan for chest abdomen pelvis was done on 6/21/2022 which reported stable sub-6 mm pulmonary nodules.  Patient had last maintenance chemotherapy on 6/7/2022.   Disease progression, patient was restarted back on chemotherapy with 5-FU leucovorin and bevacizumab 8/21/2024      HISTORY OF PRESENT ILLNESS:  The patient is a 44 y.o. female with the above-mentioned history, who is seen back today for evaluation.    Returns to the office today in anticipation of 5-FU, leucovorin.  When evaluated 2 weeks ago, we did elect to hold bevacizumab.  Unfortunately, the patient was struggling with toxicity to chemotherapy with significant diarrhea.  When evaluated 10/16/2024 we elected to hold the bevacizumab and dose reduce 5-FU by 50%.  The patient was very tearful regarding her significant diarrhea and its impact on her quality of life.  At that time, she was considering meeting with her surgeon to discuss permanent colectomy.    Today, 10/30/2024 the patient notes her tolerance to chemotherapy was slightly improved with her last cycle.  She reports the severity of her diarrhea was not as significant though it was prolonged.  She continues to utilize octreotide 3 times daily.  She also has Imodium and Lomotil to use as needed.  She was prescribed pain medication to use as needed due to her pain with bowel movements.  She did find this  beneficial      Past Medical History:   Diagnosis Date    Abdominopelvic abscess 08/12/2020    ADMITTED TO [facility]    Abnormal Pap smear of cervix 02/02/1998    JULIUS I    Abscess of abdominal wall 11/28/2012    SEEN AT [facility] ER    Anemia in pregnancy     Anxiety     Bilateral epiphora 04/2020    Bilateral hand swelling 05/02/2020    SEEN AT [facility] ER    Cervical lymphadenitis 06/06/2012    SEEN AT [facility] ER    Chemotherapy induced diarrhea 01/2021    Chemotherapy induced neutropenia 04/2020    Chemotherapy-induced nausea 02/2020    Chemotherapy-induced thrombocytopenia 05/2020    Chronic anticoagulation     Chronic diarrhea     Colon polyps     FOLLOWED BY DR. 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 ALEXANDRU HUNT    History of gestational diabetes     History of pre-eclampsia 1998    History of pre-eclampsia     History of radiation therapy 2019    FOLLOWED BY DR. JAVON LEWIS    History of TB skin testing 01/17/2009    TB Skin Test    History of TB skin testing 01/17/2009    TB Skin Test    History of transfusion 2019    12/2019    HPV in female 1998    Hypokalemia 09/2019    Hypomagnesemia 09/2019    Hyponatremia 06/2021    IBS (irritable bowel syndrome)     Ileostomy present 04/25/2020    Take down on 11/3/2022    Infected insect bite of neck 05/27/2016    SEEN AT [facility]    Kidney stones 08/09/2007    SEEN AT [facility] ER    Low anterior resection  syndrome 2022    FOLLOWED BY DR. PADMAJA ESPARZA    Lump of right breast     SWOLLEN LYMPH NODE    Lung cancer 2020    METASTATIC LUNG CANCER    Macrocytosis 2021    Monopolar depression     On anticoagulant therapy     Pain associated with surgical procedure 2020    Palmar-plantar erythrodysesthesia 2021    Perirectal abscess 2020    Pulmonary embolism without acute cor pulmonale 09/20/2019    X 3; ADMITTED TO [facility]    Pulmonary nodule, right 2020    Rectal cancer 2019    STAGE IIA, INVASIVE MODERATELY DIFFERENTIATED ADENOCARCINOMA, CHEMO AND XRT FINISHED 2019    Right shoulder pain 2020    SEEN AT [facility] ER    Right ureteral stone 10/01/2019    SEEN AT [facility] ER    SAB (spontaneous ) 1996     A2-1 INDUCED    Sciatica     Sepsis due to cellulitis 2002    LEFT GREAT TOE, ADMITTED TO [facility]    Tachycardia 2020    Thrombosis of superior vena cava 2020    AND BRACHIOCEPHALIC VEIN, ADMITTED TO [facility]    Urinary urgency 2020   Patient had COVID-19 infection diagnosed on 2022 despite was fully vaccinated and boosted.  She recovered without problem.      Past Surgical History:   Procedure Laterality Date    ABDOMINAL SURGERY      CHOLECYSTECTOMY N/A 10/10/2003    LAPAROSCOPIC WITH CHOLANGIOGRAM, DR. JAMEY TALAVERA AT [facility]    COLON RESECTION N/A 2019    Procedure: laparoscopic low anterior colon resection with mobilization of splenic flexure and diverting loop ileostomy: ERAS;  Surgeon: Padmaja Esparza MD;  Location: [facility] OR;  Service: General    COLON RESECTION N/A 2020    Procedure: LOW ANTERIOR COLON RESECTION, COLOANAL ANASTOMOSIS, MOBILIZATION SPLENIC FLEXURE;  Surgeon: Padmaja Esparza MD;  Location: [facility] OR;  Service: General;  Laterality: N/A;    COLONOSCOPY N/A 07/15/2020    PATENT ANASTAMOSIS IN MID RECTUM, RESCOPE IN 1 YR, DR. PADMAJA ESPARZA AT [facility]    COLONOSCOPY N/A 2022    DIFFUSE AREA OF MODERATELY FRIABLE  MUCOSA IN ENTIRE COLON , DIVERSION COLITIS, PATENT AND HEALTHY ANASTAMOSIS, DR. PADMAJA ESPARZA AT [facility]    COLONOSCOPY N/A 12/04/2023    6 MM SESSILE SERRATED ADENOMA POLYP IN DESCENDING, PATENT ANASTAMOSIS IN DISTAL RECTUM, RESCOPE IN 2 YRS, DR. PADMAJA ESPARZA AT [facility]    COLONOSCOPY W/ POLYPECTOMY N/A 07/08/2019    15 MM TUBULOVILLOUS ADENOMA POLYP IN HEPATIC FLEXURE, 20 MMTUBULOVILLOUS ADENOMA WITH HIGH GRADE DYSPLASIA IN RECTOSIGMOID, 4 CM MASS IN MID RECTUM, PATH: INVASIVE MODERATELY DIFFERENTIATED ADENOCARCINOMA, DR. JENNIFER LI AT [facility]    DILATATION AND EVACUATION N/A 2009    ENDOSCOPY N/A 07/08/2019    LA GRADE A ESOPHAGITIS, GASTRITIS, ALL BIOPSIES BENIGN, DR. JENNIFER LI AT [facility]    ILEOSTOMY CLOSURE N/A 11/14/2022    Procedure: ILEOSTOMY TAKEDOWN;  Surgeon: Padmaja Esparza MD;  Location: [facility] OR;  Service: General;  Laterality: N/A;    INCISION AND DRAINAGE PERIRECTAL ABSCESS N/A 08/14/2020    Procedure: INCISION AND DRAINAGE OF retrorectal dehiscence abcess with drain placement and irrigation;  Surgeon: Padmaja Esparza MD;  Location: [facility] OR;  Service: General;  Laterality: N/A;    PAP SMEAR N/A 01/24/2014    SIGMOIDOSCOPY N/A 07/24/2019    INFILTRATIVE PARTIALLY OBSTRUCTING LARGE RECTAL CANCER, AREA TATOOED, DR. PADMAJA ESPARZA AT [facility]    SIGMOIDOSCOPY N/A 11/23/2019    AN ULCERATED PARTIALLY OBSTRUCTING MASS IN MID RECTUM, AREA TATTOOED, DR. PADMAJA ESPARZA AT [facility]    SIGMOIDOSCOPY N/A 07/22/2021    PATENT ANASTAMOSIS IN DISTAL RECTUM, RESCOPE IN 1 YR, DR. PADMAJA ESPARZA AT [facility]    SIGMOIDOSCOPY N/A 09/11/2024    PATCHY INFLAMMATION AND EDEMA IN DESCENDING, AREA BIOPSIED AND WAS BENIGN, PATENT AND HEALTHY ANASTAMOSIS, HEMORRHOIDS,RESCOPE(CY) 12/2025, DR. PADMAJA ESPARZA AT [facility]    TRANSRECTAL ULTRASOUND N/A 07/24/2019    Procedure: ULTRASOUND TRANSRECTAL;  Surgeon: Padmaja Esparza MD;  Location: [facility] ENDOSCOPY;  Service: General    URETEROSCOPY LASER LITHOTRIPSY WITH  STENT INSERTION Right 10/30/2019    DR. ESTUARDO BEASLEY AT [place]    VAGINAL DELIVERY N/A 09/18/1998    VENOUS ACCESS DEVICE (PORT) INSERTION Left 08/09/2019    Procedure: INSERTION VENOUS ACCESS DEVICE;  Surgeon: Sj Joseph MD;  [place] [facility] [place] [facility]  Service: General    VENOUS ACCESS DEVICE (PORT) INSERTION N/A 12/18/2020    Procedure: INSERTION VENOUS ACCESS DEVICE right side, removal venous access device left side;  Surgeon: Sj Joseph MD;  [place] [facility];  Service: General;  Laterality: N/A;    WISDOM TOOTH EXTRACTION Bilateral 1993       HEMATOLOGIC/ONCOLOGIC HISTORY:      The patient reports she has intermittent rectal bleeding and urgency, mucous with her stool, starting sometime in 2016. At that time she was referred to GI service, and was diagnosed of irritable bowel syndrome. Nevertheless she had worsening urgency for bowel movement, and had a couple of incidents losing control of stool. She was recently seen by Roland Thorpe MD again and had colonoscopy and EGD exam on 07/08/2019. She was found having a circumferential rectal mass. According to the procedure note, the patient had a fungating circumferential bleeding 4 cm mass in the middle rectum, at distance between 13 cm and 17 cm from the anus. Mass was causing partial obstruction. There were also colon polyps found at the hepatic flexure and also at the rectosigmoid junction 23 cm from the anus. Both were resected and retrieved. EGD examination reported grade A esophagitis at the GE junction and patchy discontinuous erythema and aggravation of the mucosa without bleeding in the stomach body. There is normal mucosa of the duodenum. Pathology evaluation from this procedure reported moderately differentiated adenocarcinoma involving the rectal mass. The rectal sigmoid junction polyp was tubular/tubulovillous adenoma with high grade dysplasia negative for invasive malignancy. The hepatic flexure polyp was also  tubular/tubulovillous adenoma negative for high grade dysplasia or malignancy. The biopsy from the esophagus reports squamocolumnar mucosa with inflammatory changes suggestive of mild reflux esophagitis, negative for interstitial metaplasia. Gastric biopsy was benign and duodenal biopsy was also benign. There is no mention of H-pylori.     Patient was subsequently referred to colorectal surgeon Padmaja Ye MD for further evaluation. The patient had CT scan examination for chest, abdomen and pelvis requested by Dr. Ye and were done on 07/13/2019. The study reported no evidence of lymphadenopathy in the abdomen and pelvis. There was wall thickening of the rectosigmoid junction. Normal spleen, pancreas, adrenal glands and kidneys. There was fatty liver infiltration but no focal lymphatic lesions. There was a small 6-7 mm noncalcified nodule in the right upper lobe and a tiny 3 mm subpleural nodule in the right middle lobe. No mediastinal or hilar lymphadenopathy.     Dr. Ye performed staging rectal ultrasound examination. This study reported tumor penetrating through the muscularis propria as T3 disease; there were no lymph nodes identified.    She had a hospitalization in late September 2019 because of newly discovered pulmonary emboli.  She was on prophylactic Lovenox prior to the incident of PE.  Now she is on full dose Xarelto anticoagulation.  Patient reports no further chest pain dyspnea.  She denies bleeding or bruising.  During her hospitalization, she was seen by Dr. Ye, who plans to have surgery 8 to 12 weeks after finishing radiation therapy.  She finished her radiation on 9/19/2019.     I noticed patient also presented to the emergency room on 10/1/2019 complaining of right flank area pain.  I reviewed the images studies and indeed she has a very small 1 to 2 mm obstructing kidney stone in the UV junction.  Patient is still symptomatic with some pains and dysuria.  She denies fever sweating or  chills.    Laboratory study on 10/7/2019 reported normal CBC including hemoglobin 13.1, platelets 301,000, WBC 6170 and ANC 4900 lymphocytes 590 monocytes 480.      The nurse reported malfunction of port-a-catheter on 10/7/2019.  Port study on 10/8/2019 showed fibrin sheath around the distal aspect of the Mediport catheter in the SVC. This does not appear to hinder injection, but does prevent aspiration at this time.    Patient underwent surgical resection of the colon on 12/2/2019 with Dr. Ye.  Pathology evaluation reported residual T3 disease, also 1 out of 14 lymph nodes positive for malignancy.  So this patient in either had at least stage IIIb disease (T3 N1 M0?).      Adjuvant chemotherapy FOLFOX 6 will be started on 1/22/2020.  Laboratory study reported iron saturation 10%, free iron 31 TIBC 319 and ferritin 168.  Her hemoglobin was 11.8, WBC 5600, and platelets 347,000.    Patient was here on 02/12/2020 for cycle 2 of her FOLFOX.  This is delayed x1 week secondary to neutropenia.  The patient ultimately received 3 days worth of Neupogen with recovery of her blood counts.  Of note, the patient struggled with significant nausea without any episodes of vomiting following cycle #1 of chemotherapy resulting in significant weight loss.  She is up 12 pounds over the last week as her appetite has normalized.  We will add Emend to her care plan.    She is having several loose stools per her colostomy and has been hesitant to take Imodium due to prior history of constipation.  I encouraged her to try this with a maximum of 8 tablets/day.  She denies any infectious symptoms including fevers or chills.  No excess bleeding or bruising noted.  She had the expected cold sensitivity related to the Oxaliplatin for about 3 days following treatment.    Labs from 02/12/2020 demonstrated total white blood cell count of 5.14, ANC of 3.06, hemoglobin of 11.2, platelets of 211,000.  She was found to be iron deficient last week  and is intolerant to oral iron secondary to GI distress.  For this reason, she initiated IV iron therapy with Injectafer last week.  She had received her second dose 02/12/2020    Patient has normalized hemoglobin 12.2 on 2/26/2020.    She reported on 5/5/2020 she had a recent visit to the emergency department for what was suspected to be an allergic reaction.  She called our on-call service on Saturday with reports of hand and face swelling.  She was instructed to proceed to the emergency department at which time she was assessed and prescribed a Medrol Dosepak.  She had just completed her 5-FU and was unhooked on Friday, 5/3/2020.  Her symptoms have improved.  She does report persistent hyperpigmentation and mild swelling of the palms of the hands but this is much improved.  It was her right hand specifically that was swollen.  Her facial swelling has resolved.  She continues on Cefdinir nd since has 1 day remaining, she was prescribed cefdinir for a UTI requiring hospitalization at the end of April.  Reports no new symptoms.  Her labs are stable.  She is scheduled for treatment again.    Patient states at this time she is not tolerating her chemotherapy well.     Patient seen in the emergency department on 5/2/2020 for what was suspected to be an allergic reaction.  She called our on-call service on Saturday explaining that she was experiencing hand and face swelling.  She was instructed to proceed to the emergency department at which time she was assessed and prescribed a Medrol Dosepak.  She had just completed her 5-FU and was unhooked on Friday, 5/1/2020.      She reports since her ED visit on 5/2/2020 her symptoms have not improved. Her hands and feet were swollen upon presenting to the ED and she could barely make a fist. She states that she feels so swollen she is not able to stand it. She states that her skin on the hands and feet are peeling extensively as well, besides changing color to more dark.      She also reports significant fatigue after her first week of the 5-FU treatment but she expected this side effect. She also notices significant increase in her neuropathy to the point that she is not able to even walk around in her home without her house slippers due to irritation from her rugs. She denies and associated nausea or vomiting at this time. She also has episodes of epistaxis every day after the chemotherapy cycle #7.  She does report working full-time during the week of chemotherapy.    Laboratory studies, 5/13/2020, show moderate thrombocytopenia platelets 123,000, low normal WBC 4140 including ANC 2720 and normal hemoglobin 13.4.  Because significant hand-foot syndrome, will decrease both 5-FU and oxaliplatin by 50%, and eliminate bolus dose of 5-FU.    On 5/21/2020 patient had a PET scan performed which showed no convincing evidence of residual disease in abdomen or pelvis, no metastatic disease within the chest or neck.     Cycle #8 FOLFOX 6 was given on 5/13/2020, with 50% dose reduction for both 5-FU and oxaliplatin, and also examination of bolus 5-FU.  She states on 5/27/2020 that with the recent reduction of the chemotherapy she feels significantly better. She has more energy and is able to do her daily routine.      PET scan examination on 5/21/2020 reported no evidence of metastatic disease in chest abdomen pelvis.  There was a stable 6 mm right upper lobe pulmonary nodule.    Laboratory studies on 5/27/2020 showed mild leukocytopenia WBC 3720 but a normal ANC 2250 and lymphocytes 630.  Normal platelets 163,000 and hemoglobin 12.6.  Chemistry lab reported normal renal function, liver function panel and a electrolytes, elevated glucose 164.    Laboratory studies 6/24/2020, showed normal hemoglobin 13.4 but macrocytosis .9.  Normal platelets 210,000 and WBC 5870.  She had normal CMP.     Patient last time was here in late June 2020.  Since that time she had surgical procedure for low  anterior colon resection, coloanal anastomosis on 8/3/2020.  She later developed a perirectal abscess, required surgical drainage on 8/14/2020.  She was discharged on 8/27/2020.    Patient reports to me she still has lower abdominal wall vacuum suction in place.  She denies fever sweating chills.  Performance status is ECOG 1.  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  She does have visiting nurse come to the home for wound care.    CT scan for chest abdomen pelvis on 9/9/2020 reported a new 8 mm noncalcified pulmonary nodule in the left lower lobe.  Otherwise stable right upper lobe 6 mm pulmonary nodule, and no evidence of disease recurrence in the abdomen or pelvis.     Laboratory study on 9/16/2020 reported elevated AST 55, ALT 95, alk phosphatase 143 but normal total bilirubin 0.3.  Chemistry lab otherwise unremarkable.  She has completed normal CBC.      Because of the abnormal CT scan examination for chest abdomen pelvis on 9/9/2020 reported a new 8 mm noncalcified pulmonary nodule in the left lower lobe, we obtained a PET scan examination for further evaluation, which was done on 9/18/2020.  This study reported several pulmonary nodules, some of them are hypermetabolic, newly developed compared to previous PET scan in May 2020.  Certainly does highly suspicious for metastatic disease.  There are also hypermetabolic activity in the abdomen and pelvic area which is hard to differentiate from surgical scar versus metastatic malignancy.    Laboratory study on 10/13/2020 reported normal CBC with Hb 13.9, platelets 302,000 and WBC 5520 including ANC 3830.  Chemistry lab reported normalized AST 20, alk phosphatase 116 improved ALT 35, and maintains normal bilirubin, with normal electrolytes and liver function panel.     Patient was started on first cycle of palliative chemotherapy FOLFIRI on 10/13/2020.    She recently had hospitalization from 12/21/2020 through 12/24/2020 with a new  thrombus of the superior vena cava which developed after a new PowerPort catheter placement while the patient was on Xarelto.  Patient had a new port placed 12/18/2020, and had held her Xarelto for 2 days prior to surgery.  She presented to the ER on 12/21/20 with complaints of facial and arm swelling for 3 days.  She also noted increased shortness of breath.  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  Thrombus within the right internal jugular vein cannot be excluded.  Patient was started on IV heparin, and eventually transitioned to weight-based Lovenox, which she now continues.    PET scan examination on 9/24/2021 reported further shrinking of the tiny right upper lobe pulmonary nodule.  Otherwise no new lesions.  No evidence for metastatic disease in other areas.      Patient had CT scan for chest with IV contrast obtained on 12/14/2021 which reported small tiny stable pulmonary nodules. There is a 4 mm right upper lobe pulmonary nodule. There is also a stable 4 mm left lower lobe pulmonary nodule. There is also stable left upper lobe micronodule.     Laboratory studies today on 12/21/2021 reported normal hemoglobin 15.9 however .0, platelets 218,000 WBC 5360 including neutrophils 3730 lymphocytes 980. Chemistry lab reported normal renal function, electrolytes, glucose, and marginally elevated ALT 55, AST 34 but normal total bilirubin and alk phosphatase.     CT scan for chest abdomen pelvis 6/21/2022 reported sub-6 mm pulmonary nodules either stable or slightly smaller.  No new pulmonary nodules or evidence for disease progression.  There is no evidence for metastatic disease in the liver however it shows diffuse hepatic steatosis.  There was masslike thickening in the presacral space with the clips or calcification approximately 1.7 cm in greatest AP dimension but stable.    Laboratory study on 9/20/2022 reported normal hemoglobin 15.7 with mild macrocytosis .1.   She has normal CBC and platelets.  She also has unremarkable CMP.  Normal CEA 1.13 ng/mL.    Patient was seen by Dr. Ye, who performed ileostomy takedown on 11/14/2022.  Patient reports that she is recovering.  She still has a small open wound less than 1 cm in diameter, however close to 2 cm deep.  She has been changing dressing herself.  She denies fever sweating chills.    Patient has no other specific complaints.  She has excellent performance status ECOG 0.  She denies chest pain dyspnea cough hemoptysis.  No abdominal pain.  No melena hematochezia.  Patient has been eating well, stable weight.  She works full-time.    She continues Lovenox injections and denies any significant bleeding issues.   No other new problems or concerns.     CT scan for chest abdomen pelvis obtained on 1/10/2023 reported stable small pulmonary nodules, no evidence for active or new disease.    Laboratory study on 1/10/2023 reported normal CBC and normal CMP.  CEA level is 0.99 ng/mL.    CT scan for chest, abdomen, and pelvis on 7/7/2023 reported stable small pulmonary nodules including a left upper lobe 5 mm nodule. There were no new masses, or pleural effusion. No enlarged supraclavicular, axillary, mediastinal, or hilar lymphadenopathy. Mediastinal vasculature normal. There is occlusion of the superior vena around the catheter with body wall  is present. There was no evidence for disease recurrence in the abdomen or pelvis. Bone is also negative for metastatic disease.    Laboratory studies obtained on 07/07/2023 also reported normal CBC, and normal CMP as well as low level CEA 1.07 ng/mL.    The CT scan for the chest on 10/27/2023 reported enlarging pulmonary nodules bilaterally. The right upper lobe lung nodule increased from 7 x 7 mm to 9 x 8 mm, and the left upper lobe nodule measured 8 x 9 mm from 5 x 6 mm in 04/2023. There is a different left upper lobe nodule 6 x 8 mm from previous 5 x 5 mm. The presacral tissue  thickness measures up to 2.3 cm.    A laboratory study on 10/27/2023 reported CEA 1.58 ng/mL, normal CBC, and CMP.  Molecular study of peripheral blood Guardant Reveal is still pending.    The patient presents today on 11/17/2023 for reevaluation to discuss the results of PET scan examination as well as the results of tumor conference. I saw her recently on 11/03/2023 and because of enlarging pulmonary nodules on the CT scan, we requested a PET scan examination. I presented her case yesterday on 11/16/2023 at the [facility].    The patient reports she is at her baseline condition as 2 weeks ago. No specific complaints, no chest pain, dyspnea, cough, etc. She has a regular bowel movement.    Her case was present at the [facility]. on 11/16/2023. The PET scan images and chest CT scan were reviewed in conjunction with her treatment history. The consensus was for patient to receive stereotactic radiation therapy for the right upper lobe lung lesion, and the bigger left upper lobe lesion, and monitor the smaller left upper lobe lesion with further images studies down the line. Also, the conference recommended Guardant Reveal study which we already ordered.     This Guardant Reveal study came back today as negative for ctDNA 0%.        CT of the chest on 2/2/2024 reported shrinking of the right upper lobe lesion from 9 mm to 6 mm, and the left upper lobe lesion also decreased from 9 mm to 6 mm. Otherwise, there are a couple of stable pulmonary nodules, 7 to 8 mm. The right hilar has a small lymph node that has increased from 6 mm to 8 mm and is otherwise stable. There was no suspicious lesion in the abdomen or pelvis.    Colonoscopy examination 9/11/2024 showed patchy mild inflammation characterized by congestion/edema in the descending colon. Evidence of previous endo coloanal anastomosis in the rectum. Presence of hemorrhoids.      MEDICATIONS    Current Outpatient Medications:      bismuth subsalicylate (PEPTO BISMOL) 262 MG/15ML suspension, Take 15 mL by mouth 4 (Four) Times a Day., Disp: , Rfl:     clonazePAM (KlonoPIN) 1 MG tablet, Take 1 tablet as needed daily for anxiety. May take one additional as needed for severe anxiety., Disp: 45 tablet, Rfl: 3    Cyanocobalamin (VITAMIN B-12 PO), Take 1 tablet by mouth 3 (Three) Times a Week. M-W-F, Disp: , Rfl:     dicyclomine (BENTYL) 20 MG tablet, TAKE 1 TABLET BY MOUTH EVERY 6 (SIX) HOURS AS NEEDED FOR IRRITABLE BOWEL SYMPTOMS (Patient taking differently: Take 1 tablet by mouth Every Evening.), Disp: 360 tablet, Rfl: 2    diphenoxylate-atropine (LOMOTIL) 2.5-0.025 MG per tablet, TAKE 2 TABLETS BY MOUTH 4 TIMES A DAY, Disp: 240 tablet, Rfl: 11    Enoxaparin Sodium (LOVENOX) 100 MG/ML solution prefilled syringe syringe, INJECT 1 ML UNDER THE SKIN INTO THE APPROPRIATE AREA AS DIRECTED EVERY 12 (TWELVE) HOURS., Disp: 60 mL, Rfl: 2    escitalopram (LEXAPRO) 10 MG tablet, Take 1 tablet by mouth Daily. (Patient taking differently: Take 1 tablet by mouth Every Evening.), Disp: 90 tablet, Rfl: 1    famotidine (PEPCID) 20 MG tablet, TAKE 1 TABLET BY MOUTH TWICE A DAY (Patient taking differently: Take 1 tablet by mouth Every Evening.), Disp: 180 tablet, Rfl: 2    HYDROcodone-acetaminophen (NORCO) 5-325 MG per tablet, Take 1 tablet by mouth Every 6 (Six) Hours As Needed for Moderate Pain., Disp: 45 tablet, Rfl: 0    hydrocortisone 2.5 % cream, APPLY RECTALLY 3 TIMES DAILY. INCLUDE APPLICATOR., Disp: , Rfl:     Hydrocortisone, Perianal, (Anusol-HC) 2.5 % rectal cream, Apply rectally 3 times daily.  Include applicator., Disp: 30 g, Rfl: 1    Loperamide HCl (IMODIUM PO), Take  by mouth As Needed., Disp: , Rfl:     Loratadine 10 MG capsule, Take 1 capsule by mouth Every Evening., Disp: , Rfl:     metoprolol succinate XL (TOPROL-XL) 25 MG 24 hr tablet, TAKE 0.5 TABLETS BY MOUTH EVERY NIGHT, Disp: 45 tablet, Rfl: 2    nystatin-hydrocortisone-bacitracin in zinc  oxide ointment, Apply  topically to the appropriate area as directed Daily., Disp: 60 g, Rfl: 2    octreotide (sandoSTATIN) 100 MCG/ML injection, Inject 2 mL under the skin into the appropriate area as directed Every 8 (Eight) Hours., Disp: 168 mL, Rfl: 1    ondansetron (ZOFRAN) 8 MG tablet, TAKE 1 TABLET BY MOUTH THREE TIMES A DAY AS NEEDED FOR NAUSEA AND VOMITING, Disp: 60 tablet, Rfl: 1    pantoprazole (Protonix) 40 MG EC tablet, Take 1 tablet by mouth Daily., Disp: 90 tablet, Rfl: 1    rosuvastatin (CRESTOR) 5 MG tablet, TAKE 1 TABLET BY MOUTH EVERY DAY, Disp: 90 tablet, Rfl: 0    nystatin (MYCOSTATIN) 682110 UNIT/GM cream, Apply 1 Application topically to the appropriate area as directed 2 (Two) Times a Day., Disp: 30 g, Rfl: 2  No current facility-administered medications for this visit.    Facility-Administered Medications Ordered in Other Visits:     fluorouracil (ADRUCIL) 2,880 mg in sodium chloride 0.9 % 240 mL chemo infusion - FOR HOME USE, 1,440 mg/m2 (Treatment Plan Recorded), Intravenous, Once, Patience Lorenzo APRN, 2,880 mg at 10/30/24 1236    ALLERGIES:   No Known Allergies    SOCIAL HISTORY:       Social History     Tobacco Use    Smoking status: Every Day     Current packs/day: 1.00     Average packs/day: 1 pack/day for 25.0 years (25.0 ttl pk-yrs)     Types: Cigarettes     Passive exposure: Current    Smokeless tobacco: Never    Tobacco comments:     1 PPD/caffeine use    Vaping Use    Vaping status: Some Days    Substances: Nicotine    Devices: Disposable    Passive vaping exposure: Yes   Substance Use Topics    Alcohol use: Not Currently     Comment: RARELY    Drug use: Never         FAMILY HISTORY:   Mother has positive factor V Leiden mutation, although she did not have thrombosis, mother also is coronary disease with stenting, she also is occasional bruising.    Maternal grandmother had DVT, she had multiple surgical procedures.    Patient mother's half-brother had metastatic colon  "cancer at diagnosis in his 50s.    Maternal great aunt had breast cancer.           Vitals:    10/30/24 0926   BP: 93/66   Pulse: 75   Temp: 97.8 °F (36.6 °C)   TempSrc: Oral   SpO2: 96%   Weight: 86.5 kg (190 lb 9.6 oz)   Height: 167.6 cm (65.98\")   PainSc: 0-No pain             10/30/2024     9:20 AM   Current Status   ECOG score 0     Physical Exam  Vitals reviewed.   Constitutional:       Appearance: Normal appearance. She is well-developed.   HENT:      Head: Normocephalic and atraumatic.      Comments:         Nose: Nose normal.   Eyes:      Conjunctiva/sclera: Conjunctivae normal.   Cardiovascular:      Rate and Rhythm: Normal rate and regular rhythm.   Pulmonary:      Effort: Pulmonary effort is normal.      Breath sounds: Normal breath sounds.   Abdominal:      General: Bowel sounds are normal. There is no distension.      Palpations: Abdomen is soft. There is no mass.      Tenderness: There is no abdominal tenderness.   Musculoskeletal:      Right lower leg: No edema.      Left lower leg: No edema.   Skin:     Findings: No rash.   Neurological:      Mental Status: She is alert. Mental status is at baseline.   Psychiatric:         Mood and Affect: Mood normal. Affect is not tearful.       RECENT LABS:  Results from last 7 days   Lab Units 10/30/24  0910   WBC 10*3/mm3 6.51   NEUTROS ABS 10*3/mm3 4.51   HEMOGLOBIN g/dL 13.8   HEMATOCRIT % 42.7   PLATELETS 10*3/mm3 178       Results from last 7 days   Lab Units 10/30/24  0910   SODIUM mmol/L 145   POTASSIUM mmol/L 3.5   CHLORIDE mmol/L 109*   CO2 mmol/L 22.8   BUN mg/dL 7   CREATININE mg/dL 0.67   CALCIUM mg/dL 8.6   ALBUMIN g/dL 3.6   BILIRUBIN mg/dL 0.2   ALK PHOS U/L 82   ALT (SGPT) U/L 13   AST (SGOT) U/L 14   GLUCOSE mg/dL 120*             IMAGING:  NM PET/CT Skull Base to Mid Thigh (08/14/2024 09:17)     Assessment & Plan      ASSESSMENT:   1.  Metastatic rectal cancer.   Initial rectal ultrasound examination reported T3N0 disease without " lymphadenopathy.   CT scan of chest, abdomen and pelvis reported no lymphadenopathy in the abdomen, pelvis or chest. She does have small pulmonary nodule 6-7 mm and 2 mm with indeterminate feature. There is no solid evidence of metastatic disease otherwise.   Based on the CT scan and rectal ultrasound imaging studies, this patient had stage IIA (T3N0M0) disease.   She had PET scan examination on 8/8/2019 which reported a hypermetabolic right upper lobe pulmonary nodule 6 mm with SUV 5.6.  This is suspicious for metastatic disease however it is too small to be biopsied.  This patient may have stage IV disease.   She initiated concurrent radiation with continuous 5-FU on 8/12/2019.  Patient finished concurrent chemoradiation therapy.  Patient underwent surgical resection of the rectal tumor and diverting loop ileostomy on 12/2/2019 with Dr. Ye.  Pathology evaluation reported residual T3 disease, with 1 out of 14 lymph nodes positive for malignancy.  Certainly this patient has at least stage IIIb rectal cancer (T3N1M0?)  On 1/22/2020 adjuvant chemotherapy FOLFOX 6 regimen initiated.   On 2/5/2022 cycle #2 was delayed secondary to neutropenia.  She was given 3 days of Granix.   Emend added with cycle 3.  With improved nausea control  Continuing home Granix x3 days following 5-FU disconnect  3/11/2020 due for cycle 4, however, she is experiencing progressive abdominal pain and occasional fevers.   CT scan performed on 3/13/2020 reveals no evidence of progressive or recurrent disease.  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  Patient hospitalized 4/24-4/26/20 after cycle 5 of chemotherapy with acute UTI.  CT abdomen/pelvis noting fluid collection in the presacral region having diminished in size compared to CT dated 3/13/2020.  Patient was evaluated by Dr. Ye while in the hospital with further plans to evaluate possible colovaginal fistula following completion of chemotherapy.  Patient did respond to IV antibiotics  and discharged home on oral cefdinir.  4/29/2020 cycle 6 of FOLFOX.  Urinary symptoms have resolved   Patient seen in the [facility] ED on 5/2/2020 for what was suspected to be an allergic reaction.  She called our service on Saturday explaining that she was experiencing hand and face swelling.  She was instructed to proceed to the emergency department at which time she was assessed and prescribed a Medrol Dosepak.  She had just completed her 5-FU and was unhooked on Friday, 5/1/2020.  Her symptoms have resolved in the office on assessment, 5/5/2020.  The patient had grade 3 hand-foot syndrome based on symptomology.  Patient had cycle #8 of 5-FU on 5/13/2020. Due to her symptoms and poor tolerance to the 5-FU, her treatment dose will be reduced 50% for oxaliplatin and infusional 5-FU.  Bolus 5-FU will be discontinued..  On 5/21/2020 patient had a PET scan and it showed no evidence of of metastatic disease in the neck, chest, abdomen or pelvis.  There was fluid accumulation/abscess.   On 5/27/2020 discussed with the patient that we can discontinue chemotherapy at this time.  She will follow-up with Dr. Ye to discuss a possibility to reverse the ileostomy.  We likely will obtain anther PET scan in 3 to 4 months for reassessment.    Patient was seen by Dr. Ye on 6/19/2019 for evaluation and to discuss possible take down of her ileostomy.  She is scheduled to have a gastrografin enema on 7/2/2020 to evaluate for a fistula, and then a colonoscopy on 7/15/2020, both done by Dr. Ye.  She states that based on the above imaging and procedure findings, she may have a more extensive surgery done or just have her ileostomy reversed, which would likely be done in August 2020.  This will all be coordinated under the care of Dr. Ye.     CT scan from 09/09/2020 and also compared to her previous PET scan examination from 05/21/2020 and also the original PET scan from 08/09/2019.  The original PET scan there is a small  right upper lobe pulmonary nodule 6 mm with SUV 5.6. So in the 05/2020 PET scan the nodule is still there but activity seems much less and this most recent CT scan examination also documented the preexisting small pulmonary nodule however there is a new left lower lobe pulmonary nodule 8 mm in size and I shared with the patient today this nodule is suspicious for metastatic disease. Laboratory studies reported minimal CEA level 1.32 on 09/09/2020. Liver function panel was only marginally abnormal with the ALT 45, the remaining is normal. However laboratory study today showed worsening AST 55, ALT 95 and alkaline phosphatase 143 but is still normal, total bilirubin 0.3.   Recommended to have repeat PET scan examination for assessment because the left lower lobe pulmonary nodule is too small to be biopsied, and if the PET scan reports hypermetabolic lesion, I recommended to have stereotactic radiation therapy. Explained to patient that she is not a really good candidate to have thoracotomy for resection because she still has unhealed wound in the abdomen. Stereotactic radiation therapy will likely be a more feasible choice.   PET scan examination obtained on 9/18/2020 showed metastatic disease.  It reported a few hypermetabolic pulmonary nodules, and some new small pulmonary nodules, all of them highly suspicious for metastatic disease.  She also has hypermetabolic activity in the abdomen and pelvic area which could be related to scar tissue from her recent surgery versus metastatic disease.  Nevertheless overall picture fits with metastatic cancer.  Discussed with the patient on 9/20/2020, we reviewed the PET scan images together, and I recommended to have systematic chemotherapy, I do not think stereotactic reading therapy to the hypermetabolic lesions in the lungs warranted at this time because even those will be treated with reading therapy, she will still need systematic chemotherapy.  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  I did discuss with the patient using anti-EGFR monoclonal antibody versus anti-VEGF monoclonal antibody.     Palliative chemotherapy cycle#1 FOLFIRI was started on 10/13/2020.  No bolus 5-FU given considering previous poor tolerance.    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  Microsatellite stable, mutation burden 5/Mb.   Discussed with the patient 10/27/2020, because of the K-s[place] mutation, she is not a candidate for anti-EGFR monoclonal antibody such as Erbitux or vectibix.  She could be a candidate for anti-VEGF monoclonal antibody, however because of active wound, she is not a candidate right now at this moment.  Patient seen in the ED for acute right neck pain on 11/16/2020.  CT angiogram as well as CT of the cervical spine performed with no acute findings but notably one of her pulmonary nodules, left upper lobe, somewhat decreased in size.  Patient given prednisone pack.  Further details below.  Cycle #6 chemotherapy will be resumed on 1/5/2021.  Started back on original irinotecan.  PET scan examination obtained on 1/12/2021 after cycle #6 chemotherapy reported improved pulmonary nodule hypermetabolic activity.  Confirmed these are metastatic lesions.  Patient is evaluated 1/19/2020, will continue cycle #7 FOLFIRI chemotherapy.  However Avastin will be on hold see next section.   Patient was reevaluated on 2/2/2021, will continue chemotherapy cycle #8 FOLFIRI.  Avastin / biosimilar will be added.    She talked to [facility] specialist in mid February 2021, and had recommended palliative chemotherapy without surgical intervention of metastatic lung lesions.   On 3/2/2021, patient will proceed with cycle #10 FOLFIRI plus bevacizumab.  After 12 cycles, plan to start maintenance treatment.  3/16/2021 cycle 11.  Plus bevacizumab.  3/30/2021 cycle 12 FOLFIRI plus bevacizumab.  Having issues with  worsening nausea despite premeds with dexamethasone, Aloxi, Emend, and Zofran at home.  Patient is requesting a dose of Phenergan, which is helped her in the past.  We will give this to her with treatment today.  PET scan examination on 4/6/2021 reported no evidence of hypermetabolic metastatic lesion.  Discussed with the patient on 4/13/2021, we reviewed the PET scan results.  We recommended to switch to maintenance chemotherapy without irinotecan.  We will continue 5-FU and Avastin every 2 weeks.  We discussed possibility of switching to oral Xeloda treatment.  Discussed with the patient for side effects more so with hand-foot syndrome.  Patient is agreeable.   Xeloda 2000 mg twice daily 7 days on, 7 days off along with Avastin initiated 4/27/2021.  After only 5 doses of Xeloda significant hand-foot syndrome, Xeloda held. Patient requesting to be transitioned back to infusional 5-FU, as she felt that she tolerated infusional 5-FU without any problem.  The patient will receive 5-FU leucovorin and Avastin every 2 weeks.  Xeloda discontinued.  5/25/2021 continued cycle #4 maintenance 5-FU, leucovorin, Avastin tolerating this much better so far, we will continue this. Recommended to have 12 cycles of maintenance chemotherapy, then obtain PET scan for reevaluation.  We could discuss further treatment plan at that time.  9/14/2021 patient due for cycle 12 of additional maintenance 5-FU/leucovorin plus Avastin.  She continues to tolerate treatment well overall.  She is anxious in the office today with her Mediport not getting blood at first and also with upcoming scans being heavy on her mind.  She was instructed to take one of her Klonopin pills while here to help calm her mind.  Heart rate did improve from 140s down to 112.  Proceed with treatment today as scheduled.  PET scan examination on 9/24/2021 reported further shrinking of the right upper lobe tiny pulmonary nodule.  No other new lesions.  Discussed with  patient on 9/24/2021 about further shrinking of the right upper lobe pulmonary nodule and no evidence for other new lesions. We recommended to have chemo break for 3 months with repeated CT scan for reevaluation.  Recent CT scan for chest 12/14/2021 and abdomen CT from 11/29/2021 reported no evidence for active disease. The right upper lobe pulmonary nodule, left lower lobe pulmonary nodule minimal about 4 mm and stable. I discussed with patient today on 12/21/2021, recommended to give her another 3 months chemotherapy holiday and obtain CT scan afterwards for reevaluation. After discussion, patient is agreeable.   CT scan examination for chest abdomen and pelvis obtained on 3/15/2022 reported a slight increase of the left upper lobe pulmonary nodule 5 mm, from previous 3 mm.  The other pulmonary nodules were stable in size.  There is also small left upper lobe groundglass changes.   Dr. Nguyen reviewed images studies with the patient 3/23/2022, compared to multiple previous images including CT chest CT and PET scan, suspected disease progression.  Discussed with the patient, and I recommended to resume maintenance chemotherapy with 5-FU plus leucovorin plus Avastin repeating every 2 weeks, and obtaining CT scan for reassessment in 3 months.  Patient is agreeable.  Patient resumed maintenance chemotherapy on 3/29/2022 with 5-FU leucovorin and Avastin.   4/26/2022, proceed with cycle #27 maintenance 5-FU, leucovorin, and Avastin.  Patient continues to tolerate treatment well.    On 5/10/2022, we will proceed ahead with cycle #28 maintenance chemotherapy.  Plan to have CT scan after cycle #30.  5/24/2022 cycle 29 maintenance chemotherapy.  Continue to tolerate well.  6/7/2022 cycle #30 maintenance chemotherapy, continuing to tolerate well.   CT scan for chest abdomen pelvis with IV contrast obtained on 6/21/2022 reported sub-6 mm pulmonary nodules either stable or slightly smaller.  We reviewed the images with the  patient together.  We discussed with the patient and recommended to hold chemotherapy for now with repeating CT scan in 3 months for reassessment.  CT scan of the chest abdomen pelvis 9/13/2022 reported stable condition, no evidence for recurrent or metastatic colon cancer.  On 1/10/2023 patient had a CT chest abdomen pelvis with reported no evidence for disease progression.  Laboratory study also showed normal CEA.   Patient had a CT scan for chest, abdomen, and pelvis obtained on 04/11/2023. This study reported very small pulmonary nodules, the right upper lobe nodule is stable to 6 mm, however, the left upper lobe and the left lower lobe nodule increased to 5 mm from previous 4 mm. I discussed with the patient today on 04/19/2023, I recommend to obtain another CT scan in 3 months for reassessment. The nodules are so small, they likely will not be picked up by PET scan examination.  CT scan examination of the chest, abdomen, and pelvis obtained on 7/7/2023 reported stable small pulmonary nodules. No evidence for disease progression or new lesions in chest, abdomen, and pelvis.  Discussed with patient today on 7/14/2023, however, patient is concerning for disease progression. I recommended to obtain Guardant Reveal for circulating tumor DNA study. If the study is positive, we will further obtain PET scan examination, otherwise, we will obtain CT scan for chest, abdomen, and pelvis in 3 months for reassessment.  The patient had CT scan for the chest, abdomen, and pelvis obtained on 10/27/2023, which reported worsening pulmonary nodules at bilateral upper lobes. Laboratory studies showed low normal CEA level and normal liver function panel. The Guardant Reveal study is still pending. I discussed this with the patient on 11/03/2023 and we shared the images, and I recommended having a PET scan examination for further assessment. I will present her case at the multimodality lung conference. If those lesions are deemed  to be metastatic lesions, I recommend having stereotactic radiotherapy. I discussed it with the patient, and she voiced understanding and was agreeable with that strategy.  I presented her case at the multimodality chest conference 11/16/2023.  The consensus was for patient to receive stereotactic radiation therapy for the right upper lobe lung lesion, and the bigger left upper lobe lesion, and monitor the smaller left upper lobe lesion with further images studies down the line. Also, the conference recommended Guardant Reveal study which we already ordered. I discussed with the patient today on 11/17/2023, we explained to the patient that the opinion of the conference and she is agreeable with this and prefers this strategy because of limited toxicity compared to long term commitment to starting chemotherapy again. I recommend to obtain a CT scan for the chest, abdomen, and pelvis with both IV and oral contrast for reassessment in 3 months for reassessment of metastatic lung lesions and thickness in the pelvis where the PET scan reported a low activity SUV 2.4 in the surgical bed.   The patient had steroid-induced radiation therapy for the right upper lobe and one of the left upper lobe lesions.  Her CT scan examination on 02/02/2024 reported shrinking nodules of the right upper lobe and one of the left upper lobe lesions, both from 9 mm down to 6 mm. There are 2 stable pulmonary nodules 7 mm. Nothing new.  02/09/2024, I discussed with the patient and recommended CT scan for the chest, abdomen, and pelvis in 3 months for reassessment. Guardant Reveal study was 0% ctDNA. We will also continue laboratory studies every 3 months for Guardant Reveal, together with routine labs, CBC, CMP, and CEA.  CT scan of the chest, abdomen, and pelvis conducted on 4/23/2024 revealed stable multiple pulmonary nodules, with no other new pulmonary nodules or evidence of disease recurrence or abnormal pelvis. The patient's liver  function panel was normal, and low-level CEA level was also noted. The Guardant Reveal study was able to be obtained earlier due to regulatory rules, and we hugo obtain today the Guardant reveal study, be available within 10 to 14 days.   Given the patient's metastatic liver lesion, and lung lesions from colon cancer, careful monitoring is necessary. A chest CT scan with IV contrast will be arranged in 3 months for reevaluation. The study will be reviewed again in 3 months, which will include CBC, CMP, and CEA level.   Imaging study with chest CT scan on 08/02/2024 reported multiple bilateral pulmonary nodules that are relatively stable. However, the Guardant Reveal reported positive ctDNA indicating disease progression. A subsequent PET scan examination on 08/14/2024 reported newly developed hypermetabolic pulmonary nodules. The highest SUV is 3.7, corresponding to an 8 mm new right upper lobe nodule, and there are several other hypometabolic nodules in the lungs.   8/21/2024 resumption of chemotherapy with 5-FU bevacizumab  Triage visit 8/28/2024 with intractable diarrhea that was unclear if this is secondary to chemotherapy or infectious etiology given her known chronic colitis, fever and abdominal pain.  Further management as outlined below  9/4/2024 per review today diarrhea has improved though she is having some difficulty with passage of gas and stool,?  Related to significant inflammation in the rectum versus tumor obstruction.  The passage of gas has improved in the last few days but she does still have to change positions on the toilet to get stool to pass without it being painful.  Discussed all of this with Dr. Nguyen and we will refer the patient urgently back to Dr. Ye for at the very least rectal examination in the office versus full repeated colonoscopy.  Because of the potential for examination or invasive procedures, we will hold bevacizumab today.  Because the patient had significant diarrhea  last week and concerns that it may be related to chemotherapy we will decrease her 5-FU/leucovorin today by 30%.  If she does well we can go back to full dose with cycle 3.     9/18/2024 she presented for evaluation prior to cycle #3 of palliative chemotherapy with 5-FU, leucovorin, and bevacizumab. Given her recent biopsy on 09/11/2024, it is prudent to postpone the Avastin treatment today to ensure safety.  Chemotherapy will be proceeded.      She presented for evaluation on 10/02/2024, tolerating chemotherapy except for diarrhea. This has been managed with octreotide. Proceed with cycle 4 chemotherapy today with 5-FU, leucovorin and resumption of bevacizumab.   Reevaluation 10/16/2024 due for cycle 5 5-FU, leucovorin, bevacizumab.  Due to ongoing significant diarrhea, bevacizumab will be placed on hold for potential surgical intervention.  Additionally, 5-FU will be dose reduced to 50%.  Additional adjustments as outlined below  Patient reviewed 10/30/2024 with improved tolerance to cycle five 5-FU, leucovorin.  We we will attempt to slightly escalate 5-FU dosing, increasing by 10% (40% dose reduction)    2.  Intractable diarrhea due to chemotherapy.   Patient developed diarrhea on Saturday, 8/24/2024.  Is unclear if this is related to infection as she did have fevers at the time the diarrhea began or chemotherapy.  She does have chronic colitis noted on most recent PET scan, this therefore could be diverticulitis flare  GI panel and C. difficile negative  8/29/2024 she did receive a single dose of octreotide  Begin empiric Flagyl 500 mg 3 times daily x 10 days  8/30/2024 discussed negative stool studies.  We will add Levaquin to already prescribed Flagyl to complete management of diverticulitis.  It is unclear if the patient's symptoms are related to diverticular flare given her previous abdominal pain and fever versus chemotherapy.  However, her symptoms are currently improved  9/4/2024 diarrhea has resolved.   Stools are now more soft with some form but she is having difficulty with passage of stool, having to change positions on the toilet to avoid pain.  We are referring back to Dr. Ye as detailed above.  She will finish out the Flagyl and Levaquin as prescribed having roughly 3 days left of both.  We will also adjust doses of 5-FU/leucovorin today with concern for potential chemotherapy-induced diarrhea.  If she does well this cycle we can increase back to full dose with cycle 3 per Dr. Nguyen.  On 9/18/2024 patient reports that she experienced significant diarrhea during cycle #2 chemotherapy on 09/04/2024, leading to a 30% dose reduction. Despite taking Lomotil 2 tablets four times a day, Imodium, and Pepto-Bismol, her diarrhea persisted. She received octreotide shots twice, which improved her symptoms from watery to mushy stools but did not fully resolve the issue. She will continue with the current regimen and consider adding octreotide to her home regimen if diarrhea starts at home. The potential side effects of octreotide, including nausea, were discussed.   10/1/2024 she reports using octreotide self-injection at home, which has significantly improved her diarrhea. Most of the time, she only requires it once a day and occasionally twice a day, depending on the severity of her diarrhea. She is satisfied with the results, which have improved her quality of life and ability to eat properly.   She will also use Lomotil and the Imodium as needed.  Patient is very tearful in the office 10/16/2024 regarding debilitating diarrhea and interference with quality of life.  She questions potential surgical intervention and placement of colostomy due to ongoing pain in her rectum and burning of her skin from diarrhea.  We discussed adjustments today including increased dose of octreotide to 200 mcg 3 times daily for diarrhea.  Chemotherapy dose adjustments.  Area was slightly improved following most recent cycle of  chemotherapy.  Continue supportive care      3.   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  Low-dose Lovenox discontinued and initiated Xarelto 20 mg daily.  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  Patient held Xarelto for 2 days prior to MediPort removal from the left chest wall and placement of new port in the right chest wall on 12/18/2020.  She resumed Xarelto that evening.  Progressive swelling in the neck, face, upper extremities prompting hospitalization with CT scan showing thrombosis in the right subclavian vein and SVC.  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  Although she had been off of Xarelto for a few days, clearly Xarelto was not prohibiting progression of her thrombosis after she resumed treatment and there was some evidence to suggest thrombosis had been present at least in the innominate vein on prior scan in September but now appears chronic.  Current acute thrombosis involving SVC and right subclavian.  Patient was admitted and placed on heparin drip  Patient was evaluated by vascular surgery and intervention was not recommended for thrombolysis/thrombectomy.  On 12/22/2020, the patient developed worsening shortness of breath, increasing upper extremity edema consistent with worsening SVC syndrome.  Repeat CT angiogram chest was performed early on 12/23/2020 with findings of stable SVC and brachiocephalic vein  thrombosis, no evidence of pulmonary embolism.  Symptoms improved and patient was discharged 12/24/2020 on Lovenox 100 mg every 12 hours.    Returns 12/29/2020 for evaluation continuing Lovenox, overall tolerating it well.  No bleeding issues.  Improved edema 1/5/2021.  Will continue Lovenox weight-based every 12 hours.  On 1/19/2021 and 2/2/2021, patient reports improved facial swelling.  She however does have being bruise on her abdomen wall where she does Lovenox injection.  However she does not have a spontaneous epistaxis, gum bleeding or other bleeding.   On 2/2/2021, we discussed that side effects of Avastin/biosimilar related to thrombosis.  Since this patient already has been on Lovenox, I think the benefits from treatment for her cancer will outweigh that risk of thrombosis, will proceed ahead with Avastin.  I cautioned patient to watch out for signs of worsening thrombosis patient voiced understanding.   5/11/2021 Lovenox dosing will be adjusted due to patient's weight loss.  We discussed she needs 1 mg/kg.  Her syringes are 100 mg syringes she will do 0.9 mL subcu every 12 hours.  8/3/2021 Patient continues to have bruising on abdomen from lovenox injections.  Will need to monitor weight and adjust dosing in future if further weight loss.   Stable condition on 9/28/2021.  Continue Lovenox anticoagulation.    Patient reports no chest pain no dyspnea no leg edema. However she had bruising and also most recently abnormal small abscess for which she actually went to ER on 11/29/2021, had I&D. The wound is healing. No further drainage. Denies fever sweating or chills. After discussion, she will continue Lovenox injection for now.   On 3/23/2022, patient reports good tolerance of Lovenox.  Nevertheless she still has small quantity of pus in the left lower abdomen abscess, she squeezes it every day to prevent it became worse.  She reports no fever sweating chills.  Recommended to start Augmentin 875 mg twice a  day for 7 days.  She will continue Lovenox indefinitely.  On 4/12/2022, patient reports resolution of the small abscess at left lower abdominal wall.   On 5/10/2022, patient continues on Lovenox indefinitely.  No easy bleeding or bruising.  6/23/2022 continuing on Lovenox 1 mg/kg at a dose of 100 mg (patient weight is 96.6 kg today) every 12 hours.  On 1/17/2023, patient reports good tolerance, Lovenox will be continued.    Patient had a venous Doppler study on 02/05/2023, which reported acute left upper extremity DVT in the subclavian vein, axillary and the brachial vein, and also superficial thrombophlebitis in the basilic upper arm.   On 04/19/2023, patient reports that she was not compliant with anticoagulation at the time of recurrent DVT. She now is fully compliant and is using Lovenox.  Continues on anticoagulation without signs or symptoms of bleeding.  She does have occasional irritation and bleeding with wiping related to frequent diarrhea  Due to right sided pleuritic pain the patient was seen in the emergency department 10/22/2024 with a negative CT angiogram.  Without thrombosis.  Anticoagulation continued.      4.  Mild anemia.   She also has history of iron deficiency.  Iron studies reveal iron saturation of 10%.  She was started on oral iron but was unable to tolerate due to GI side effects.   Status post Injectafer 2/5/2020 and 2/12/2020   Improved hemoglobin 13.5 on 1/5/2021.  3/16/2020 when hemoglobin now up to 16.2, hematocrit 47.6.  Patient admits that she has started smoking again, and I have encouraged her to quit.  She denies any snoring or sleep apnea diagnosis.  Patient is not taking oral iron.  We will closely monitor.  3/30/2020 hemoglobin 15.7, HCT 47.5.  Patient states that she has cut back significantly on her smoking, although not quit yet.  I have encouraged her to continue working on this.  We will continue to closely monitor.  Hemoglobin 14.6 on 6/8/2021 at the meantime he has  worsening macrocytosis .6.    Laboratory studies 6/22/2021 reported excellent folate > 20 ng/mL, however low normal B12 level 357 pg/mL.    On 7/6/2021, normal hemoglobin 15.8, .9 and HCT 47.2%.  Patient was asked to start oral vitamin B12 at 1000 mcg daily.   9/14/2021 hemoglobin 17.0, hematocrit 52.1.  Monitor.  On 9/28/2021 hemoglobin 16.1 hematocrit 48.5%, continue to monitor.   Lab study on 12/14/2021 reported excellent ferritin 296 and iron saturation 25% with free iron 104 TIBC 424. Hemoglobin was 15.2 with .2.   Normal hemoglobin 14.6 on 3/15/2022.  Iron study reported normal ferritin 129.5 ng/mL however slightly low iron saturation 11%.  Continue to monitor for now.  9/4/2024 hemoglobin is normal at 14.0  Hemoglobin remains normal today, 10/30/2024 at 13.8    5.  Peripheral neuropathy secondary to chemotherapy.   This is mild involving bilateral hands and feet as reported on 9/16/2020.   Will avoid chemotherapy with oxaliplatin.  Stable mild neuropathy as of 11/10/2020  Patient reports worsening peripheral neuropathy and cycle #5 chemotherapy on 12/8/2020 with and without irinotecan.   On 1/5/2021, patient reports improvement of peripheral neuropathy, will add irinotecan back to chemotherapy.  Continue to monitor and adjust medication.  Stable.      6.  Depression.  Patient seen by JULIET Davenport on 11/9/2020.  She was started on mirtazapine.  Lexapro was discontinued.  This has definitely improved her mood with the patient feeling overall much better.  She is sleeping better.  Appetite is improved with her actually eating more than she wants to.    Condition is stable.  She plans to continue follow-up with supportive oncology.  Patient is very tearful in the office today regarding need for dose adjustment to chemotherapy due to significant toxicity.    7. Malfunction of the portal catheter  Patient reports there was no blood drawn from the portal catheter. CT scan of the chest on  7/7/2023 reported occlusion of the SVC around to the Port-A-Cath tubing. I recommend trying activase to see if it helps.  Otherwise, we may have to remove the catheter. Clinically, patient has no signs of SVC syndrome.  On 11/03/2023, the patient reports that her Port-A-Cath was able to be used for a CT scan for the injection of intravenous dye; however, there was no blood return, so we needed to draw from her arm for the blood test. We will continue to keep Port-A-Cath for now.  On 02/09/2024, the patient reports that the port was able to be flushed. However, no blood was returned. We will continue to flush every 6 weeks.  9/4/2024 she continues to have no blood return from her port but can taste saline as we flush at each time and is functioning well otherwise.  Monitor.  10162/2024 no specific problem.      8. Internal hemorrhoids.  She has large internal hemorrhoids diagnosed during a flexible sigmoidoscopy on 09/11/2024. Hydrocortisone cream was prescribed for treatment.       9.  This patient also has strong family history for malignancy   The patient had appointment with genetic counseling on September 3, 2019.  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    10. Hypertension.  Continue management of hypertension and follow up with PCP.      PLAN:    Proceed today with cycle 6 chemotherapy with 5-FU, leucovorin.  We will slightly increase dosing of 5-FU by 10% (total of 40% dose reduction) due to improved tolerance.  Bevacizumab will remain on home  Continue octreotide, currently utilizing 100 mcg every 8 hours following chemotherapy  Continue Imodium and Lomotil as needed.  She will return in 2 days to discontinue the 5-FU infusion.   Continue Protonix 40 mg daily.   Continue Gas-X.   Continue Magic barrier cream rectum as needed   Continue anticoagulation with Lovenox subcu injection every 12 hours.   A liquid biopsy/Guardant reveal study obtained today to monitor her response together with intermittent  imaging studies.  Return in 2 weeks for CBC, CMP, review of [id number] and MD evaluation with Dr. Nguyen  Pending tolerance to chemotherapy we can consider referral back to Dr. Ye for colostomy as the patient's diarrhea is quite debilitating and negatively impacting her quality of life. For this reason bevacizumab remains on hold until MD follow up    The patient is on a high risk medication requiring close monitoring for toxicity.  Today's chemotherapy dosing was discussed and reviewed with Dr. Madsen (#2) who is in agreement    Patience Lorenzo, APRN  10/30/2024        CC:  Deepika Vela III NP-C   MD Alverto Bowers MD

## 2024-10-31 ENCOUNTER — HOSPITAL ENCOUNTER (OUTPATIENT)
Dept: GENERAL RADIOLOGY | Facility: HOSPITAL | Age: 44
Discharge: HOME | End: 2024-10-31
Admitting: NURSE PRACTITIONER
Payer: COMMERCIAL

## 2024-10-31 ENCOUNTER — INFUSION (OUTPATIENT)
Dept: ONCOLOGY | Facility: HOSPITAL | Age: 44
End: 2024-10-31
Payer: COMMERCIAL

## 2024-10-31 DIAGNOSIS — Z45.2 FITTING AND ADJUSTMENT OF VASCULAR CATHETER: ICD-10-CM

## 2024-10-31 DIAGNOSIS — Z79.899 HIGH RISK MEDICATION USE: ICD-10-CM

## 2024-10-31 DIAGNOSIS — C20 ADENOCARCINOMA OF RECTUM: ICD-10-CM

## 2024-10-31 DIAGNOSIS — C78.00 MALIGNANT NEOPLASM METASTATIC TO LUNG, UNSPECIFIED LATERALITY: ICD-10-CM

## 2024-10-31 DIAGNOSIS — Z79.899 ENCOUNTER FOR LONG-TERM (CURRENT) USE OF HIGH-RISK MEDICATION: Primary | ICD-10-CM

## 2024-10-31 DIAGNOSIS — D68.51 HETEROZYGOUS FACTOR V LEIDEN MUTATION: ICD-10-CM

## 2024-10-31 PROCEDURE — 25510000001 IOPAMIDOL PER 1 ML: Performed by: NURSE PRACTITIONER

## 2024-10-31 PROCEDURE — G0498 CHEMO EXTEND IV INFUS W/PUMP: HCPCS

## 2024-10-31 PROCEDURE — 25010000002 FLUOROURACIL PER 500 MG: Performed by: NURSE PRACTITIONER

## 2024-10-31 PROCEDURE — 36598 INJ W/FLUOR EVAL CV DEVICE: CPT

## 2024-10-31 RX ORDER — IOPAMIDOL 755 MG/ML
100 INJECTION, SOLUTION INTRAVASCULAR
Status: COMPLETED | OUTPATIENT
Start: 2024-10-31 | End: 2024-10-31

## 2024-10-31 RX ADMIN — FLUOROURACIL 2880 MG: 50 INJECTION, SOLUTION INTRAVENOUS at 15:03

## 2024-10-31 RX ADMIN — IOPAMIDOL 10.5 ML: 755 INJECTION, SOLUTION INTRAVENOUS at 14:03

## 2024-10-31 NOTE — NURSING NOTE
Pt returned to infusion center due to leaking from port site while connected to 5FU chemo ball.  Spoke with MICHAEL Morin who saw pt yesterday before treatment start.  Per Patience pt to have STAT dye study of port to ensure proper function.  Chemo ball disconnected and pt sent for dye study.  Once imaging is completed, determination of new chemo ball and re-access of port site will be discussed.  Pt is agreement of this plan and both pharmacy and [facility] are aware of possible need for another chemo ball.    1500 Port dye study completed and shows that it is functioning though fibrin sheath is present.  Per MICHAEL Morin pt to receive same dosing of 5FU chemo ball for 46 hrs and will be disconnected on Saturday.  Pt agreeable to this plan.  [facility] adjusted plan and re-entered chemo ball and unhook day 4.

## 2024-11-02 ENCOUNTER — APPOINTMENT (OUTPATIENT)
Dept: GENERAL RADIOLOGY | Facility: HOSPITAL | Age: 44
End: 2024-11-02
Payer: COMMERCIAL

## 2024-11-02 ENCOUNTER — INFUSION (OUTPATIENT)
Dept: ONCOLOGY | Facility: HOSPITAL | Age: 44
End: 2024-11-02
Payer: COMMERCIAL

## 2024-11-02 ENCOUNTER — HOSPITAL ENCOUNTER (EMERGENCY)
Facility: HOSPITAL | Age: 44
Discharge: HOME | End: 2024-11-02
Attending: EMERGENCY MEDICINE | Admitting: EMERGENCY MEDICINE
Payer: COMMERCIAL

## 2024-11-02 ENCOUNTER — APPOINTMENT (OUTPATIENT)
Dept: CT IMAGING | Facility: HOSPITAL | Age: 44
End: 2024-11-02
Payer: COMMERCIAL

## 2024-11-02 VITALS
SYSTOLIC BLOOD PRESSURE: 149 MMHG | OXYGEN SATURATION: 92 % | RESPIRATION RATE: 16 BRPM | TEMPERATURE: 99.4 F | HEART RATE: 71 BPM | DIASTOLIC BLOOD PRESSURE: 86 MMHG

## 2024-11-02 DIAGNOSIS — C78.00 MALIGNANT NEOPLASM METASTATIC TO LUNG, UNSPECIFIED LATERALITY: ICD-10-CM

## 2024-11-02 DIAGNOSIS — Z79.899 ENCOUNTER FOR LONG-TERM (CURRENT) USE OF HIGH-RISK MEDICATION: Primary | ICD-10-CM

## 2024-11-02 DIAGNOSIS — R07.81 PLEURITIC CHEST PAIN: Primary | ICD-10-CM

## 2024-11-02 DIAGNOSIS — C20 ADENOCARCINOMA OF RECTUM: ICD-10-CM

## 2024-11-02 DIAGNOSIS — Z45.2 ENCOUNTER FOR FITTING AND ADJUSTMENT OF VASCULAR CATHETER: ICD-10-CM

## 2024-11-02 LAB
ALBUMIN SERPL-MCNC: 4 G/DL (ref 3.5–5.2)
ALBUMIN/GLOB SERPL: 1.5 G/DL
ALP SERPL-CCNC: 89 U/L (ref 39–117)
ALT SERPL W P-5'-P-CCNC: 14 U/L (ref 1–33)
ANION GAP SERPL CALCULATED.3IONS-SCNC: 11.5 MMOL/L (ref 5–15)
AST SERPL-CCNC: 11 U/L (ref 1–32)
BASOPHILS # BLD AUTO: 0.02 10*3/MM3 (ref 0–0.2)
BASOPHILS NFR BLD AUTO: 0.4 % (ref 0–1.5)
BILIRUB SERPL-MCNC: 0.3 MG/DL (ref 0–1.2)
BUN SERPL-MCNC: 8 MG/DL (ref 6–20)
BUN/CREAT SERPL: 11.8 (ref 7–25)
CALCIUM SPEC-SCNC: 9.6 MG/DL (ref 8.6–10.5)
CHLORIDE SERPL-SCNC: 102 MMOL/L (ref 98–107)
CO2 SERPL-SCNC: 27.5 MMOL/L (ref 22–29)
CREAT SERPL-MCNC: 0.68 MG/DL (ref 0.57–1)
DEPRECATED RDW RBC AUTO: 49.5 FL (ref 37–54)
EGFRCR SERPLBLD CKD-EPI 2021: 109.6 ML/MIN/1.73
EOSINOPHIL # BLD AUTO: 0.07 10*3/MM3 (ref 0–0.4)
EOSINOPHIL NFR BLD AUTO: 1.2 % (ref 0.3–6.2)
ERYTHROCYTE [DISTWIDTH] IN BLOOD BY AUTOMATED COUNT: 14.5 % (ref 12.3–15.4)
GLOBULIN UR ELPH-MCNC: 2.7 GM/DL
GLUCOSE SERPL-MCNC: 117 MG/DL (ref 65–99)
HCT VFR BLD AUTO: 44.4 % (ref 34–46.6)
HGB BLD-MCNC: 15.1 G/DL (ref 12–15.9)
IMM GRANULOCYTES # BLD AUTO: 0.01 10*3/MM3 (ref 0–0.05)
IMM GRANULOCYTES NFR BLD AUTO: 0.2 % (ref 0–0.5)
LYMPHOCYTES # BLD AUTO: 1.27 10*3/MM3 (ref 0.7–3.1)
LYMPHOCYTES NFR BLD AUTO: 22.3 % (ref 19.6–45.3)
MCH RBC QN AUTO: 32.1 PG (ref 26.6–33)
MCHC RBC AUTO-ENTMCNC: 34 G/DL (ref 31.5–35.7)
MCV RBC AUTO: 94.5 FL (ref 79–97)
MONOCYTES # BLD AUTO: 0.11 10*3/MM3 (ref 0.1–0.9)
MONOCYTES NFR BLD AUTO: 1.9 % (ref 5–12)
NEUTROPHILS NFR BLD AUTO: 4.22 10*3/MM3 (ref 1.7–7)
NEUTROPHILS NFR BLD AUTO: 74 % (ref 42.7–76)
NRBC BLD AUTO-RTO: 0 /100 WBC (ref 0–0.2)
PLATELET # BLD AUTO: 207 10*3/MM3 (ref 140–450)
PMV BLD AUTO: 9.2 FL (ref 6–12)
POTASSIUM SERPL-SCNC: 3.5 MMOL/L (ref 3.5–5.2)
PROT SERPL-MCNC: 6.7 G/DL (ref 6–8.5)
RBC # BLD AUTO: 4.7 10*6/MM3 (ref 3.77–5.28)
SODIUM SERPL-SCNC: 141 MMOL/L (ref 136–145)
TROPONIN T SERPL HS-MCNC: 12 NG/L
WBC NRBC COR # BLD AUTO: 5.7 10*3/MM3 (ref 3.4–10.8)

## 2024-11-02 PROCEDURE — 80053 COMPREHEN METABOLIC PANEL: CPT | Performed by: EMERGENCY MEDICINE

## 2024-11-02 PROCEDURE — 96376 TX/PRO/DX INJ SAME DRUG ADON: CPT

## 2024-11-02 PROCEDURE — 25010000002 HYDROMORPHONE PER 4 MG: Performed by: EMERGENCY MEDICINE

## 2024-11-02 PROCEDURE — 84484 ASSAY OF TROPONIN QUANT: CPT | Performed by: EMERGENCY MEDICINE

## 2024-11-02 PROCEDURE — 25510000001 IOPAMIDOL PER 1 ML: Performed by: EMERGENCY MEDICINE

## 2024-11-02 PROCEDURE — 25010000002 ONDANSETRON PER 1 MG: Performed by: EMERGENCY MEDICINE

## 2024-11-02 PROCEDURE — 99285 EMERGENCY DEPT VISIT HI MDM: CPT

## 2024-11-02 PROCEDURE — 25010000002 HEPARIN LOCK FLUSH PER 10 UNITS: Performed by: INTERNAL MEDICINE

## 2024-11-02 PROCEDURE — 96374 THER/PROPH/DIAG INJ IV PUSH: CPT

## 2024-11-02 PROCEDURE — 96523 IRRIG DRUG DELIVERY DEVICE: CPT

## 2024-11-02 PROCEDURE — 93005 ELECTROCARDIOGRAM TRACING: CPT | Performed by: EMERGENCY MEDICINE

## 2024-11-02 PROCEDURE — 96375 TX/PRO/DX INJ NEW DRUG ADDON: CPT

## 2024-11-02 PROCEDURE — 25010000002 HYDROMORPHONE 1 MG/ML SOLUTION: Performed by: EMERGENCY MEDICINE

## 2024-11-02 PROCEDURE — 71046 X-RAY EXAM CHEST 2 VIEWS: CPT

## 2024-11-02 PROCEDURE — 71275 CT ANGIOGRAPHY CHEST: CPT

## 2024-11-02 PROCEDURE — 85025 COMPLETE CBC W/AUTO DIFF WBC: CPT | Performed by: EMERGENCY MEDICINE

## 2024-11-02 RX ORDER — ONDANSETRON 2 MG/ML
4 INJECTION INTRAMUSCULAR; INTRAVENOUS ONCE
Status: COMPLETED | OUTPATIENT
Start: 2024-11-02 | End: 2024-11-02

## 2024-11-02 RX ORDER — SODIUM CHLORIDE 9 MG/ML
1000 INJECTION, SOLUTION INTRAVENOUS ONCE
Status: DISCONTINUED | OUTPATIENT
Start: 2024-11-02 | End: 2024-11-02 | Stop reason: HOSPADM

## 2024-11-02 RX ORDER — SODIUM CHLORIDE 0.9 % (FLUSH) 0.9 %
10 SYRINGE (ML) INJECTION AS NEEDED
OUTPATIENT
Start: 2024-11-02

## 2024-11-02 RX ORDER — HEPARIN SODIUM (PORCINE) LOCK FLUSH IV SOLN 100 UNIT/ML 100 UNIT/ML
500 SOLUTION INTRAVENOUS AS NEEDED
OUTPATIENT
Start: 2024-11-02

## 2024-11-02 RX ORDER — IOPAMIDOL 755 MG/ML
100 INJECTION, SOLUTION INTRAVASCULAR
Status: COMPLETED | OUTPATIENT
Start: 2024-11-02 | End: 2024-11-02

## 2024-11-02 RX ORDER — SODIUM CHLORIDE 0.9 % (FLUSH) 0.9 %
10 SYRINGE (ML) INJECTION AS NEEDED
Status: DISCONTINUED | OUTPATIENT
Start: 2024-11-02 | End: 2024-11-02 | Stop reason: HOSPADM

## 2024-11-02 RX ORDER — HYDROMORPHONE HYDROCHLORIDE 1 MG/ML
0.5 INJECTION, SOLUTION INTRAMUSCULAR; INTRAVENOUS; SUBCUTANEOUS ONCE
Status: COMPLETED | OUTPATIENT
Start: 2024-11-02 | End: 2024-11-02

## 2024-11-02 RX ORDER — HEPARIN SODIUM (PORCINE) LOCK FLUSH IV SOLN 100 UNIT/ML 100 UNIT/ML
500 SOLUTION INTRAVENOUS AS NEEDED
Status: DISCONTINUED | OUTPATIENT
Start: 2024-11-02 | End: 2024-11-02 | Stop reason: HOSPADM

## 2024-11-02 RX ADMIN — HYDROMORPHONE HYDROCHLORIDE 0.5 MG: 1 INJECTION, SOLUTION INTRAMUSCULAR; INTRAVENOUS; SUBCUTANEOUS at 21:15

## 2024-11-02 RX ADMIN — ONDANSETRON 4 MG: 2 INJECTION, SOLUTION INTRAMUSCULAR; INTRAVENOUS at 17:29

## 2024-11-02 RX ADMIN — Medication 10 ML: at 12:42

## 2024-11-02 RX ADMIN — HEPARIN 500 UNITS: 100 SYRINGE at 12:42

## 2024-11-02 RX ADMIN — HYDROMORPHONE HYDROCHLORIDE 1 MG: 1 INJECTION, SOLUTION INTRAMUSCULAR; INTRAVENOUS; SUBCUTANEOUS at 17:29

## 2024-11-02 RX ADMIN — IOPAMIDOL 95 ML: 755 INJECTION, SOLUTION INTRAVENOUS at 20:07

## 2024-11-02 NOTE — ED PROVIDER NOTES
EMERGENCY DEPARTMENT ENCOUNTER    Room Number:  31/31  PCP: Deepika Vela III NP-C  Historian: Patient      HPI:  Chief Complaint: Right-sided pleuritic chest pain  A complete HPI/ROS/PMH/PSH/SH/FH are unobtainable due to: None  Context: Carole Weaver is a 44 y.o. female who presents to the ED c/o right-sided pleuritic chest pain.  Patient has history of stage IV adenocarcinoma.  Patient states pain started last week.  Patient was seen here for pleuritic chest pain.  Had CT scan of the chest that was negative.  Patient then had leaking from her port.  She had a port study that was normal.  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  Patient states infusion stopped today.  Patient has had gradual increasing of right-sided chest pain.  Patient states pain is worse with movement deep breathing even swallowing.  Has had no vomiting or diarrhea.  No fevers or cough.            PAST MEDICAL HISTORY  Active Ambulatory Problems     Diagnosis Date Noted    Anxiety 08/09/2016    Irritable bowel syndrome 08/09/2016    Monopolar depression 05/09/2019    Adenocarcinoma of rectum 07/12/2019    Family history of factor V Leiden mutation 08/01/2019    Heterozygous factor V Leiden mutation 08/02/2019    Encounter for fitting and adjustment of vascular catheter 08/07/2019    Functional diarrhea 09/03/2019    Adverse effect of antineoplastic and immunosuppressive drugs, initial encounter 09/03/2019    Hypokalemia 09/03/2019    Hypomagnesemia 09/03/2019    Other pulmonary embolism without acute cor pulmonale 09/20/2019    Kidney stone on right side 10/07/2019    Hydronephrosis with ureteropelvic junction (UPJ) obstruction 10/15/2019    Partial intestinal obstruction, unspecified as to cause 07/08/2019    Irregular heart beat 12/10/2019    Gastrointestinal hemorrhage, unspecified 07/08/2019    Esophagitis 07/08/2019    Family history of colonic polyps 12/10/2019    Chronic diarrhea 12/10/2019    Calculus of  gallbladder 12/10/2019    Benign neoplasm of transverse colon 07/08/2019    Benign neoplasm of rectum and anal canal 07/08/2019    Loss of weight 12/10/2019    Heart murmur 12/10/2019    Anemia 01/22/2020    Anticoagulation adequate 01/22/2020    Iron deficiency 02/05/2020    Adverse effect of iron 02/05/2020    Drug-induced peripheral neuropathy 04/15/2020    On antineoplastic chemotherapy 04/25/2020    Chemotherapy-induced thrombocytopenia 04/25/2020    HTN (hypertension) 04/25/2020    Chronic anticoagulation 04/25/2020    Chemotherapy-induced neutropenia 04/29/2020    Hand foot syndrome 05/13/2020    Pulmonary nodule, right 05/13/2020    Perirectal abscess 08/12/2020    Pulmonary nodules 09/16/2020    Secondary cancer of lung 10/05/2020    Leukocytopenia 11/10/2020    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    Tachycardia 12/23/2020    History of rectal cancer 05/19/2021    Macrocytosis without anemia 06/22/2021    Right ureteral stone 10/01/2019    Pulmonary embolism without acute cor pulmonale 09/20/2019    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    On anticoagulant therapy     Lump of right breast     Kidney stones 08/09/2007    HPV in female 1998    History of radiation therapy 2019    History of gestational diabetes     History of chemotherapy 2019    History of anemia     Hemorrhoids     GERD (gastroesophageal reflux disease)     Fistula of intestine     Colon polyps     Chemotherapy induced diarrhea 01/2021    Cervical lymphadenitis 06/06/2012    Bilateral epiphora 04/2020    Family history of colon cancer 10/05/2022    Neck pain on left side 02/05/2023    Partial small bowel obstruction 07/22/2023    History of pulmonary embolism 11/18/2023    Coronary artery calcification 07/08/2024    Tobacco abuse 07/08/2024    Encounter for long-term (current) use of high-risk medication 08/19/2024     Resolved Ambulatory Problems     Diagnosis Date Noted    Immunization due 11/08/2018    UTI (urinary tract  infection) 2019    Rectal cancer 2019    Hematochezia 12/10/2019    Pain associated with surgical procedure 2020    Ileostomy present 2020    Rectal cancer 2020    Rectal cancer 2020    Abdominopelvic abscess 2020    Hyponatremia 2020    Malignant neoplasm of colon 01/15/2021    Dysuria 2021    Urinary urgency 2020    Sepsis due to cellulitis 2002    Sciatica     SAB (spontaneous ) 1996    Right shoulder pain 2020    Rectal cancer 2019    Macrocytosis 2021    Lung cancer 2020    Infected insect bite of neck 2016    Ileostomy in place     IBS (irritable bowel syndrome)     History of TB skin testing 2009    History of pre-eclampsia     Factor V Leiden mutation     Depression     Cystitis 2020    Cystitis 10/27/2020    Chemotherapy-induced nausea 2020    Chemotherapy induced neutropenia 2020    Bilateral hand swelling 2020    Anemia in pregnancy     Abnormal Pap smear of cervix 1998     Past Medical History:   Diagnosis Date    Abscess of abdominal wall 2012    COVID-19 2022    Diversion colitis 2022    Gallstones     Hearing loss     History of transfusion     Low anterior resection syndrome 2022         PAST SURGICAL HISTORY  Past Surgical History:   Procedure Laterality Date    ABDOMINAL SURGERY      CHOLECYSTECTOMY N/A 10/10/2003    LAPAROSCOPIC WITH CHOLANGIOGRAM, DR. JAMEY TALAVERA AT [facility]    COLON RESECTION N/A 2019    Procedure: laparoscopic low anterior colon resection with mobilization of splenic flexure and diverting loop ileostomy: ERAS;  Surgeon: Padmaja Ye MD;  Location: [facility] OR;  Service: General    COLON RESECTION N/A 2020    Procedure: LOW ANTERIOR COLON RESECTION, COLOANAL ANASTOMOSIS, MOBILIZATION SPLENIC FLEXURE;  Surgeon: Padmaja Ye MD;  Location: [facility] MAIN OR;  Service: General;  Laterality: N/A;    COLONOSCOPY  N/A 07/15/2020    PATENT ANASTAMOSIS IN MID RECTUM, RESCOPE IN 1 YR, DR. PADMAJA ESPARZA AT [facility]    COLONOSCOPY N/A 11/03/2022    DIFFUSE AREA OF MODERATELY FRIABLE MUCOSA IN ENTIRE COLON , DIVERSION COLITIS, PATENT AND HEALTHY ANASTAMOSIS, DR. PADMAJA ESPARZA AT [facility]    COLONOSCOPY N/A 12/04/2023    6 MM SESSILE SERRATED ADENOMA POLYP IN DESCENDING, PATENT ANASTAMOSIS IN DISTAL RECTUM, RESCOPE IN 2 YRS, DR. PADMAJA ESPARZA AT [facility]    COLONOSCOPY W/ POLYPECTOMY N/A 07/08/2019    15 MM TUBULOVILLOUS ADENOMA POLYP IN HEPATIC FLEXURE, 20 MMTUBULOVILLOUS ADENOMA WITH HIGH GRADE DYSPLASIA IN RECTOSIGMOID, 4 CM MASS IN MID RECTUM, PATH: INVASIVE MODERATELY DIFFERENTIATED ADENOCARCINOMA, DR. JENNIFER LI AT [facility]    DILATATION AND EVACUATION N/A 2009    ENDOSCOPY N/A 07/08/2019    LA GRADE A ESOPHAGITIS, GASTRITIS, ALL BIOPSIES BENIGN, DR. JENNIFER LI AT [facility]    ILEOSTOMY CLOSURE N/A 11/14/2022    Procedure: ILEOSTOMY TAKEDOWN;  Surgeon: Padmaja Esparza MD;  Location: [facility];  Service: General;  Laterality: N/A;    INCISION AND DRAINAGE PERIRECTAL ABSCESS N/A 08/14/2020    Procedure: INCISION AND DRAINAGE OF retrorectal dehiscence abcess with drain placement and irrigation;  Surgeon: Padmaja Esparza MD;  Location: [facility];  Service: General;  Laterality: N/A;    PAP SMEAR N/A 01/24/2014    SIGMOIDOSCOPY N/A 07/24/2019    INFILTRATIVE PARTIALLY OBSTRUCTING LARGE RECTAL CANCER, AREA TATOOED, DR. PADMAJA ESPARZA AT [facility]    SIGMOIDOSCOPY N/A 11/23/2019    AN ULCERATED PARTIALLY OBSTRUCTING MASS IN MID RECTUM, AREA TATTOOED, DR. PADMAJA ESPARZA AT [facility]    SIGMOIDOSCOPY N/A 07/22/2021    PATENT ANASTAMOSIS IN DISTAL RECTUM, RESCOPE IN 1 YR, DR. PADMAJA ESPARZA AT [facility]    SIGMOIDOSCOPY N/A 09/11/2024    PATCHY INFLAMMATION AND EDEMA IN DESCENDING, AREA BIOPSIED AND WAS BENIGN, PATENT AND HEALTHY ANASTAMOSIS, HEMORRHOIDS,RESCOPE(CY) 12/2025, DR. PADMAJA ESPARZA AT [facility]    TRANSRECTAL ULTRASOUND N/A  07/24/2019    Procedure: ULTRASOUND TRANSRECTAL;  Surgeon: Padmaja Ye MD;  Location: [facility] ENDOSCOPY;  Service: General    URETEROSCOPY LASER LITHOTRIPSY WITH STENT INSERTION Right 10/30/2019    DR. ESTUARDO BEASLEY AT [facility]    VAGINAL DELIVERY N/A 09/18/1998    VENOUS ACCESS DEVICE (PORT) INSERTION Left 08/09/2019    Procedure: INSERTION VENOUS ACCESS DEVICE;  Surgeon: Sj Joseph MD;  Location:  [facility];  Service: General    VENOUS ACCESS DEVICE (PORT) INSERTION N/A 12/18/2020    Procedure: INSERTION VENOUS ACCESS DEVICE right side, removal venous access device left side;  Surgeon: Sj Joseph MD;  Location:  [facility];  Service: General;  Laterality: N/A;    WISDOM TOOTH EXTRACTION Bilateral 1993         FAMILY HISTORY  Family History   Problem Relation Age of Onset    Hyperlipidemia Mother     Colon polyps Mother     Heart disease Mother     Hypertension Mother     Factor V Leiden deficiency Mother     Skin cancer Father         Squamous in 60s    Atrial fibrillation Father     Drug abuse Sister     Mental illness Sister         Addiction    No Known Problems Son     Colon cancer Maternal Uncle     Cancer Paternal Uncle     Colon cancer Paternal Uncle     Diabetes Paternal Uncle     Heart disease Paternal Grandfather     Cancer Paternal Aunt         OVARIAN    Malig Hyperthermia Neg Hx          SOCIAL HISTORY  Social History     Socioeconomic History    Marital status:      Spouse name: Jsutus    Number of children: 1    Years of education: College   Tobacco Use    Smoking status: Every Day     Current packs/day: 1.00     Average packs/day: 1 pack/day for 25.0 years (25.0 ttl pk-yrs)     Types: Cigarettes     Passive exposure: Current    Smokeless tobacco: Never    Tobacco comments:     1 PPD/caffeine use    Vaping Use    Vaping status: Some Days    Substances: Nicotine    Devices: Disposable    Passive vaping exposure: Yes   Substance and Sexual Activity    Alcohol use: Not  Currently     Comment: RARELY    Drug use: Never    Sexual activity: Yes     Partners: Male     Comment: .         ALLERGIES  Patient has no known allergies.        REVIEW OF SYSTEMS  Review of Systems   Right-sided chest pain      PHYSICAL EXAM  ED Triage Vitals   Temp Heart Rate Resp BP SpO2   11/02/24 1700 11/02/24 1700 11/02/24 1700 11/02/24 1703 11/02/24 1700   99.4 °F (37.4 °C) 79 16 169/100 90 %      Temp src Heart Rate Source Patient Position BP Location FiO2 (%)   11/02/24 1700 11/02/24 1700 11/02/24 1703 11/02/24 1703 --   Tympanic Monitor Sitting Left arm        Physical Exam      GENERAL: no acute distress.  Appears uncomfortable  HENT: nares patent  EYES: no scleral icterus  CV: regular rhythm, normal rate  RESPIRATORY: normal effort.  Lungs clear bilaterally  ABDOMEN: soft  MUSCULOSKELETAL: no deformity.  Chest wall nontender  NEURO: alert, moves all extremities, follows commands  PSYCH:  calm, cooperative  SKIN: warm, dry    Vital signs and nursing notes reviewed.          LAB RESULTS  Recent Results (from the past 24 hours)   Comprehensive Metabolic Panel    Collection Time: 11/02/24  5:26 PM    Specimen: Blood   Result Value Ref Range    Glucose 117 (H) 65 - 99 mg/dL    BUN 8 6 - 20 mg/dL    Creatinine 0.68 0.57 - 1.00 mg/dL    Sodium 141 136 - 145 mmol/L    Potassium 3.5 3.5 - 5.2 mmol/L    Chloride 102 98 - 107 mmol/L    CO2 27.5 22.0 - 29.0 mmol/L    Calcium 9.6 8.6 - 10.5 mg/dL    Total Protein 6.7 6.0 - 8.5 g/dL    Albumin 4.0 3.5 - 5.2 g/dL    ALT (SGPT) 14 1 - 33 U/L    AST (SGOT) 11 1 - 32 U/L    Alkaline Phosphatase 89 39 - 117 U/L    Total Bilirubin 0.3 0.0 - 1.2 mg/dL    Globulin 2.7 gm/dL    A/G Ratio 1.5 g/dL    BUN/Creatinine Ratio 11.8 7.0 - 25.0    Anion Gap 11.5 5.0 - 15.0 mmol/L    eGFR 109.6 >60.0 mL/min/1.73   Single High Sensitivity Troponin T    Collection Time: 11/02/24  5:26 PM    Specimen: Blood   Result Value Ref Range    HS Troponin T 12 <14 ng/L   CBC Auto  Differential    Collection Time: 11/02/24  5:26 PM    Specimen: Blood   Result Value Ref Range    WBC 5.70 3.40 - 10.80 10*3/mm3    RBC 4.70 3.77 - 5.28 10*6/mm3    Hemoglobin 15.1 12.0 - 15.9 g/dL    Hematocrit 44.4 34.0 - 46.6 %    MCV 94.5 79.0 - 97.0 fL    MCH 32.1 26.6 - 33.0 pg    MCHC 34.0 31.5 - 35.7 g/dL    RDW 14.5 12.3 - 15.4 %    RDW-SD 49.5 37.0 - 54.0 fl    MPV 9.2 6.0 - 12.0 fL    Platelets 207 140 - 450 10*3/mm3    Neutrophil % 74.0 42.7 - 76.0 %    Lymphocyte % 22.3 19.6 - 45.3 %    Monocyte % 1.9 (L) 5.0 - 12.0 %    Eosinophil % 1.2 0.3 - 6.2 %    Basophil % 0.4 0.0 - 1.5 %    Immature Grans % 0.2 0.0 - 0.5 %    Neutrophils, Absolute 4.22 1.70 - 7.00 10*3/mm3    Lymphocytes, Absolute 1.27 0.70 - 3.10 10*3/mm3    Monocytes, Absolute 0.11 0.10 - 0.90 10*3/mm3    Eosinophils, Absolute 0.07 0.00 - 0.40 10*3/mm3    Basophils, Absolute 0.02 0.00 - 0.20 10*3/mm3    Immature Grans, Absolute 0.01 0.00 - 0.05 10*3/mm3    nRBC 0.0 0.0 - 0.2 /100 WBC   ECG 12 Lead Chest Pain    Collection Time: 11/02/24  5:35 PM   Result Value Ref Range    QT Interval 380 ms    QTC Interval 431 ms       Ordered the above labs and reviewed the results.        RADIOLOGY  CT Angiogram Chest    Result Date: 11/2/2024  CT ANGIOGRAM CHEST PULMONARY EMBOLISM  TECHNIQUE: Radiation dose reduction techniques were utilized, including automated exposure control and exposure modulation based on body size. 2 mm images were obtained through the chest after the administration of IV contrast. 3D reformat images were created. Multiple coronal, sagittal, and 3-D reconstruction images were obtained.  HISTORY: Pleuritic chest pain.  COMPARISON: CT chest 10/22/2024, 8/2/2024 2/2/2024 and 7/19/2019    FINDINGS: Nondilated main pulmonary artery. Pulmonary arteries are patent.  Left brachiocephalic vein is extremely diminutive and not opacified with contrast (series 4/image 36). Dense contrast bolus present in the left subclavian vein and partially  visualized left axillary vein. Contrast refluxes into the internal mammary veins with numerous anterior chest wall and mediastinal collaterals (series 4/image 47). This was present dating back to February 2024. Right IJ port with tip at the superior cavoatrial junction. Heart is normal in size. No appreciable coronary artery calcifications. Trace pericardial fluid. Nondilated thoracic aorta. No mediastinal/hilar lymphadenopathy. Decompressed esophagus.  Trachea and main bronchi are patent. No bronchiectasis or bronchial wall thickening. Scattered bilateral sub-6 mm solid pulmonary nodules are mildly decreased in size from August 2024. Reference right upper lobe 5 mm (series 5/image 55) previously 8 mm. Reference 5 mm left lower lobe (series 5/image 57) previously 7 mm. Right upper lobe linear scarring with adjacent sub-6 mm solid nodule. No pleural effusion.        1. No evidence of pulmonary thromboembolism. 2. Scattered bilateral sub-6 mm solid pulmonary nodules are mildly decreased in size from August 2024. 3. 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   This report was finalized on 11/2/2024 8:48 PM by Dr. Milind Murray M.D on Workstation: [id number]      XR Chest 2 View    Result Date: 11/2/2024  XR CHEST 2 VW-  CLINICAL HISTORY:  chest pain, stage IV colon cancer.  COMPARISON:  None  FINDINGS: PA and lateral views of the chest demonstrates a Mediport with the tip at the junction of the superior vena cava and right atrium. The heart is within normal limits in size. There is no evidence of infiltrate, effusion or congestive failure. The pulmonary nodules noted on the CT examination of 10/22/2024 are occult. Further evaluation could be performed with a CT examination of the chest with contrast as indicated.  This report was finalized on 11/2/2024 6:02 PM by Dr. Aniceto Colunga M.D on Workstation: [id number]       Ordered the above noted radiological studies.  Chest x-ray independently  interpreted by me and shows no evidence of pneumothorax          PROCEDURES  Procedures      EKG          EKG time: 1735  Rhythm/Rate: Normal sinus rhythm 77  P waves and MT: Normal P waves  QRS, axis: Normal QRS  ST and T waves: Normal ST-T waves    Interpreted Contemporaneously by me, independently viewed  Unchanged compared to prior 10/22/2024      MEDICATIONS GIVEN IN ER  Medications   HYDROmorphone (DILAUDID) injection 1 mg (1 mg Intravenous Given 11/2/24 1729)   ondansetron (ZOFRAN) injection 4 mg (4 mg Intravenous Given 11/2/24 1729)   iopamidol (ISOVUE-370) 76 % injection 100 mL (95 mL Intravenous Given by Other 11/2/24 2007)   HYDROmorphone (DILAUDID) injection 0.5 mg (0.5 mg Intravenous Given 11/2/24 2115)                   MEDICAL DECISION MAKING, PROGRESS, and CONSULTS    All labs have been independently reviewed by me.  All radiology studies have been reviewed by me and I have also reviewed the radiology report.   EKG's independently viewed and interpreted by me.  Discussion below represents my analysis of pertinent findings related to patient's condition, differential diagnosis, treatment plan and final disposition.      Additional sources:  - Discussed/ obtained information from independent historians: None    - External (non-ED) record review: Epic reviewed and patient was here for port study 10/31/2024 and showed port was fine    - Chronic or social conditions impacting care: None    - Shared decision making: Discussed options with patient including keeping patient in the hospital for pain control.  Patient states she does not want to stay.  Instructed to return here for any concerns.  There is diagnostic uncertainty.      Orders placed during this visit:  Orders Placed This Encounter   Procedures    XR Chest 2 View    CT Angiogram Chest    Comprehensive Metabolic Panel    Single High Sensitivity Troponin T    CBC Auto Differential    ECG 12 Lead Chest Pain    CBC & Differential         Additional  orders considered but not ordered:  None        Differential diagnosis includes but is not limited to:    PE versus pneumonia versus pneumothorax versus pleurisy      Independent interpretation of labs, radiology studies, and discussions with consultants:  ED Course as of 11/02/24 2123   Sat Nov 02, 2024 2108 21:08 EDT  Patient with right-sided pleuritic chest pain of unclear etiology.  Repeat CT scan does not show pulmonary embolism.  Patient is EKG troponin look okay.  Both of these episodes of happened after infusions into her port.  Patient had port study that showed it is in the right spot and working correctly.  Discussed options with patient including gave her the option to stay in the hospital.  Patient does not want to stay in the hospital.  She has pain medications at home.  Instructed to return here for any concerns. [SL]      ED Course User Index  [SL] Sukhi Bravo MD                 DIAGNOSIS  Final diagnoses:   Pleuritic chest pain         DISPOSITION  DISCHARGE    Patient discharged in stable condition.    Reviewed implications of results, diagnosis, meds, responsibility to follow up, warning signs and symptoms of possible worsening, potential complications and reasons to return to ER, including worsening symptoms    Patient/Family voiced understanding of above instructions.    Discussed plan for discharge, as there is no emergent indication for admission. Patient referred to primary care provider for BP management due to today's BP. Pt/family is agreeable and understands need for follow up and repeat testing.  Pt is aware that discharge does not mean that nothing is wrong but it indicates no emergency is present that requires admission and they must continue care with follow-up as given below or physician of their choice.     FOLLOW-UP  Deepika Vela III, NP-C  [address] [id number] [phone]    Schedule an appointment as soon as possible for  a visit       Dong Nguyen MD PhD  [address]  [place] [id number]  [phone]    Schedule an appointment as soon as possible for a visit            Medication List        Changed      dicyclomine 20 MG tablet  Commonly known as: BENTYL  TAKE 1 TABLET BY MOUTH EVERY 6 (SIX) HOURS AS NEEDED FOR IRRITABLE BOWEL SYMPTOMS  What changed: See the new instructions.     escitalopram 10 MG tablet  Commonly known as: LEXAPRO  Take 1 tablet by mouth Daily.  What changed: when to take this     famotidine 20 MG tablet  Commonly known as: PEPCID  TAKE 1 TABLET BY MOUTH TWICE A DAY  What changed: when to take this                       Latest Documented Vital Signs:  As of 21:23 EDT  BP- 149/86 HR- 71 Temp- 99.4 °F (37.4 °C) (Tympanic) O2 sat- 92%              --    Please note that portions of this were completed with a voice recognition program.       Note Disclaimer: At [facility], we believe that sharing information builds trust and better relationships. You are receiving this note because you are receiving care at [facility] or recently visited. It is possible you will see health information before a provider has talked with you about it. This kind of information can be easy to misunderstand. To help you fully understand what it means for your health, we urge you to discuss this note with your provider.            Sukhi Bravo MD  11/02/24 [id number]

## 2024-11-03 LAB
QT INTERVAL: 380 MS
QTC INTERVAL: 431 MS

## 2024-11-06 LAB — REF LAB TEST METHOD: NORMAL

## 2024-11-07 ENCOUNTER — TELEPHONE (OUTPATIENT)
Dept: ONCOLOGY | Facility: CLINIC | Age: 44
End: 2024-11-07
Payer: COMMERCIAL

## 2024-11-07 DIAGNOSIS — C20 ADENOCARCINOMA OF RECTUM: Primary | ICD-10-CM

## 2024-11-07 DIAGNOSIS — C78.00 MALIGNANT NEOPLASM METASTATIC TO LUNG, UNSPECIFIED LATERALITY: ICD-10-CM

## 2024-11-07 DIAGNOSIS — Z45.2 ENCOUNTER FOR FITTING AND ADJUSTMENT OF VASCULAR CATHETER: ICD-10-CM

## 2024-11-07 NOTE — PROGRESS NOTES
REASON FOR FOLLOW UP:     Rectal cancer, rectal ultrasound examination in July 2019 reported T3N0 disease without lymphadenopathy. She does have small pulmonary nodule 6-7 mm and 2 mm with indeterminate feature. There is no solid evidence of metastatic disease otherwise. Patient has stage IIA (T3N0M0) disease.  The patient is heterozygous factor V Leiden, was on prophylactic anticoagulation with Lovenox 40 mg daily given her increased risk of thrombosis with MediPort and GI malignancy.   PET scan on 8/8/2019 reported an 8 mm hypermetabolic right upper lobe pulmonary nodule, which is suspicious for metastatic as well.    Patient had a PowerPort placement on the left upper chest by Dr. Joseph on 8/9/2019.  Patient was started on concurrent infusional 5-FU chemoradiation therapy on 8/12/2019, with planned complete surgical resection and further adjuvant chemotherapy with intention to cure the disease.   Patient underwent surgical resection of the primary rectal cancer by Dr. Ye on 12/2/2019.  Pathology evaluation reported residual T3N1 disease stage IIIb.  Diarrhea related to therapy and radiation.   Patient was started cycle 1 day 1 of adjuvant FOLFOX 6 on 1/23/2020.  On 2/5/2020 FOLFOX 6 cycle 2 delayed secondary to neutropenia.  Patient was given 3 days of Granix injection.  After cycle #2 we planned 3 days of Granix with each cycle.   Patient also intolerant of oral iron.  Patient received 2 doses of IV injectafer on 02/05/2020 and 02/12/2020.   02/12/2020 Proceed with cycle #2 of FOLFOX 6 with 3 days of Granix.  On 3/11/2020 cycle 4 postponed secondary to abdominal pain and occasional low-grade fevers.  CT scans ordered.  Cycle #4 resumed after CT scan revealed no evidence of disease.  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 Ye. patient has no fever.  Continue to observe.   Grade 3 hand-foot syndrome secondary to 5-FU after cycle #7  FOLFOX 6 chemotherapy, prompting ER visit.  Also has worsening peripheral neuropathy.   Cycle #8 FOLFOX 6 was given on 5/13/2020, with 50% dose reduction for both 5-FU and oxaliplatin, and also examination of bolus 5-FU.   PET scan examination on 5/21/2020 reported no evidence of metastatic disease in the chest abdomen pelvis.  Stable 6 mm RUL pulmonary nodule.  On 5/27/2020, I discussed with the patient that we can discontinue chemotherapy at this time.   Patient had a surgical procedure for low anterior colon resection, coloanal anastomosis on 8/3/2020.  CT scan for chest abdomen pelvis on 9/9/2020 reported a new 8 mm noncalcified pulmonary nodule in the left lower lobe of the lung.  Otherwise stable right upper lobe 6 mm pulmonary nodule, and no evidence of disease recurrence in the abdomen or pelvis.  PET scan examination on 9/18/2020 reported multiple hypermetabolic small pulmonary nodules/ new pulmonary nodules.   Patient was started on pelvic chemotherapy with FOLFIRI regimen on 10/13/2020.   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 It reported tumor mutation burden 5 Muts/Mb, microsatellite stable, TP53 mutation [id number], and others.   Cycle #5 was without irinotecan, due to peripheral neuropathy.  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  Patient was switched to weight-based Lovenox injection.  Cycle #6 5-FU and irinotecan was delayed by 2 weeks because of the above incident.  Patient had a telemedicine evaluation at that the [facility] cancer [place] in February 2021.  They agreed with our treatment plan for palliative chemotherapy followed by maintenance chemotherapy.    PET scan examination on 4/6/2021 after cycle #12 palliative chemotherapy reported no evidence of hypermetabolic metastatic lesion.   Patient was started on maintenance chemotherapy with 5-FU and  Avastin on 4/13/2021. (Unable to tolerate Xeloda because of a significant hand-foot syndrome).   PET scan on 9/24/2021 obtained after cycle #12 maintenance chemotherapy with 5-FU, leucovorin, Avastin reported no evidence for active disease. We recommended 3 months chemotherapy holiday.  CT scan examination on 3/15/2022 reported slightly disease progression with increase in size of small pulmonary nodule.  We started patient back on maintenance chemotherapy on 3/29/2022 treatment with 5-FU plus leucovorin and Avastin, repeating every 2 weeks.  After 6 more maintenance chemotherapy, CT scan for chest abdomen pelvis was done on 6/21/2022 which reported stable sub-6 mm pulmonary nodules.  Patient had last maintenance chemotherapy on 6/7/2022.   Disease progression, patient was restarted back on chemotherapy with 5-FU leucovorin and bevacizumab 8/21/2024      HISTORY OF PRESENT ILLNESS:  The patient is a 44 y.o. female with the above-mentioned history, who is seen today for triage visit.    He started to notice pain in her right chest/rib cage about 4 weeks ago.  She started applying Voltaren cream to this area, and the pain eventually completely resolved.    Last week when she was connected to her 5-FU she started to notice fluid leaking from her port area.  Because of this on 10/31/2024 she was sent for a port dye study which showed excellent position of port with no evidence of extravasation, small fibrin sheath noted at the tip of the port.  Since no issues identified with the port, she was reconnected to her 5-FU and scheduled for disconnect on 11/1/2024.  Around this time, she also started to notice pain in her right rib cage area again.  Pain significantly worsened after 5-FU was unhooked, when she also developed pain in her right back/scapular area.  Because of this, on 11/2/2024 she presented to the ED complaining of right sided pleuritic chest pain.  CT scan performed in the ED, negative for pulmonary embolism.   EKG and troponin performed which were both normal.     She is continued with pain in her right chest and right back.  She has been utilizing Norco 5/325 regularly for this pain.  Pain has improved some, but definitely still present.  For several nights, she had to sit up in bed to sleep because of the pain.  She does have pain with deep inspiration.  She also has pain with certain movements in her arm.    Past Medical History:   Diagnosis Date    Abdominopelvic abscess 08/12/2020    ADMITTED TO [facility]    Abnormal Pap smear of cervix 02/02/1998    JULIUS I    Abscess of abdominal wall 11/28/2012    SEEN AT [facility] ER    Anemia in pregnancy     Anxiety     Bilateral epiphora 04/2020    Bilateral hand swelling 05/02/2020    SEEN AT [facility] ER    Cervical lymphadenitis 06/06/2012    SEEN AT [facility] ER    Chemotherapy induced diarrhea 01/2021    Chemotherapy induced neutropenia 04/2020    Chemotherapy-induced nausea 02/2020    Chemotherapy-induced thrombocytopenia 05/2020    Chronic anticoagulation     Chronic diarrhea     Colon polyps     FOLLOWED BY DR. 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 ALEXANDRU HUNT    History of gestational diabetes     History of pre-eclampsia 1998    History of pre-eclampsia     History of radiation therapy 2019    FOLLOWED BY DR. JAVON LEWIS    History of TB skin testing 01/17/2009    TB Skin Test    History of TB skin testing  2009    TB Skin Test    History of transfusion 2019    HPV in female 1998    Hypokalemia 2019    Hypomagnesemia 2019    Hyponatremia 2021    IBS (irritable bowel syndrome)     Ileostomy present 2020    Take down on 11/3/2022    Infected insect bite of neck 2016    SEEN AT [facility]    Kidney stones 2007    SEEN AT [facility] ER    Low anterior resection syndrome 2022    FOLLOWED BY DR. PADMAJA ESPARZA    Lump of right breast     SWOLLEN LYMPH NODE    Lung cancer 2020    METASTATIC LUNG CANCER    Macrocytosis 2021    Monopolar depression     On anticoagulant therapy     Pain associated with surgical procedure 2020    Palmar-plantar erythrodysesthesia 2021    Perirectal abscess 2020    Pulmonary embolism without acute cor pulmonale 09/20/2019    X 3; ADMITTED TO [facility]    Pulmonary nodule, right 2020    Rectal cancer 2019    STAGE IIA, INVASIVE MODERATELY DIFFERENTIATED ADENOCARCINOMA, CHEMO AND XRT FINISHED 2019    Right shoulder pain 2020    SEEN AT [facility] ER    Right ureteral stone 10/01/2019    SEEN AT [facility] ER    SAB (spontaneous ) 1996     A2-1 INDUCED    Sciatica     Sepsis due to cellulitis 2002    LEFT GREAT TOE, ADMITTED TO [facility]    Tachycardia 2020    Thrombosis of superior vena cava 2020    AND BRACHIOCEPHALIC VEIN, ADMITTED TO [facility]    Urinary urgency 2020   Patient had COVID-19 infection diagnosed on 2022 despite was fully vaccinated and boosted.  She recovered without problem.      Past Surgical History:   Procedure Laterality Date    ABDOMINAL SURGERY      CHOLECYSTECTOMY N/A 10/10/2003    LAPAROSCOPIC WITH CHOLANGIOGRAM, DR. JAMEY TALAVERA AT [facility]    COLON RESECTION N/A 2019    Procedure: laparoscopic low anterior colon resection with mobilization of splenic flexure and diverting loop ileostomy: ERAS;  Surgeon: Padmaja Esparza MD;  Location: [facility] OR;  Service: General    COLON  RESECTION N/A 08/03/2020    Procedure: LOW ANTERIOR COLON RESECTION, COLOANAL ANASTOMOSIS, MOBILIZATION SPLENIC FLEXURE;  Surgeon: Padmaja Esparza MD;  Location: [facility];  Service: General;  Laterality: N/A;    COLONOSCOPY N/A 07/15/2020    PATENT ANASTAMOSIS IN MID RECTUM, RESCOPE IN 1 YR, DR. PADMAJA ESPARZA AT [facility]    COLONOSCOPY N/A 11/03/2022    DIFFUSE AREA OF MODERATELY FRIABLE MUCOSA IN ENTIRE COLON , DIVERSION COLITIS, PATENT AND HEALTHY ANASTAMOSIS, DR. PADMAJA ESPARZA AT [facility]    COLONOSCOPY N/A 12/04/2023    6 MM SESSILE SERRATED ADENOMA POLYP IN DESCENDING, PATENT ANASTAMOSIS IN DISTAL RECTUM, RESCOPE IN 2 YRS, DR. PADMAJA ESPARZA AT [facility]    COLONOSCOPY W/ POLYPECTOMY N/A 07/08/2019    15 MM TUBULOVILLOUS ADENOMA POLYP IN HEPATIC FLEXURE, 20 MMTUBULOVILLOUS ADENOMA WITH HIGH GRADE DYSPLASIA IN RECTOSIGMOID, 4 CM MASS IN MID RECTUM, PATH: INVASIVE MODERATELY DIFFERENTIATED ADENOCARCINOMA, DR. JENNIFER LI AT [facility]    DILATATION AND EVACUATION N/A 2009    ENDOSCOPY N/A 07/08/2019    LA GRADE A ESOPHAGITIS, GASTRITIS, ALL BIOPSIES BENIGN, DR. JENNIFER LI AT [facility]    ILEOSTOMY CLOSURE N/A 11/14/2022    Procedure: ILEOSTOMY TAKEDOWN;  Surgeon: Padmaja Esparza MD;  Location: [facility];  Service: General;  Laterality: N/A;    INCISION AND DRAINAGE PERIRECTAL ABSCESS N/A 08/14/2020    Procedure: INCISION AND DRAINAGE OF retrorectal dehiscence abcess with drain placement and irrigation;  Surgeon: Padmaja Esparza MD;  Location: [facility];  Service: General;  Laterality: N/A;    PAP SMEAR N/A 01/24/2014    SIGMOIDOSCOPY N/A 07/24/2019    INFILTRATIVE PARTIALLY OBSTRUCTING LARGE RECTAL CANCER, AREA TATOOED, DR. PADMAJA ESPARZA AT [facility]    SIGMOIDOSCOPY N/A 11/23/2019    AN ULCERATED PARTIALLY OBSTRUCTING MASS IN MID RECTUM, AREA TATTOOED, DR. PADMAJA ESPARZA AT [facility]    SIGMOIDOSCOPY N/A 07/22/2021    PATENT ANASTAMOSIS IN DISTAL RECTUM, RESCOPE IN 1 YR, DR. PADMAJA ESPARZA AT [facility]     SIGMOIDOSCOPY N/A 09/11/2024    PATCHY INFLAMMATION AND EDEMA IN DESCENDING, AREA BIOPSIED AND WAS BENIGN, PATENT AND HEALTHY ANASTAMOSIS, HEMORRHOIDS,RESCOPE(CY) 12/2025, DR. GERONIMO YE AT [facility]    TRANSRECTAL ULTRASOUND N/A 07/24/2019    Procedure: ULTRASOUND TRANSRECTAL;  Surgeon: Geronimo Ye MD;  Location: [facility] ENDOSCOPY;  Service: General    URETEROSCOPY LASER LITHOTRIPSY WITH STENT INSERTION Right 10/30/2019    DR. ESTUARDO BEASLEY AT [place]    VAGINAL DELIVERY N/A 09/18/1998    VENOUS ACCESS DEVICE (PORT) INSERTION Left 08/09/2019    Procedure: INSERTION VENOUS ACCESS DEVICE;  Surgeon: Sj Joseph MD;  Location:  [facility];  Service: General    VENOUS ACCESS DEVICE (PORT) INSERTION N/A 12/18/2020    Procedure: INSERTION VENOUS ACCESS DEVICE right side, removal venous access device left side;  Surgeon: Sj Joseph MD;  Location:  [facility];  Service: General;  Laterality: N/A;    WISDOM TOOTH EXTRACTION Bilateral 1993       HEMATOLOGIC/ONCOLOGIC HISTORY:      The patient reports she has intermittent rectal bleeding and urgency, mucous with her stool, starting sometime in 2016. At that time she was referred to GI service, and was diagnosed of irritable bowel syndrome. Nevertheless she had worsening urgency for bowel movement, and had a couple of incidents losing control of stool. She was recently seen by Roland Thorpe MD again and had colonoscopy and EGD exam on 07/08/2019. She was found having a circumferential rectal mass. According to the procedure note, the patient had a fungating circumferential bleeding 4 cm mass in the middle rectum, at distance between 13 cm and 17 cm from the anus. Mass was causing partial obstruction. There were also colon polyps found at the hepatic flexure and also at the rectosigmoid junction 23 cm from the anus. Both were resected and retrieved. EGD examination reported grade A esophagitis at the GE junction and patchy discontinuous erythema and aggravation  of the mucosa without bleeding in the stomach body. There is normal mucosa of the duodenum. Pathology evaluation from this procedure reported moderately differentiated adenocarcinoma involving the rectal mass. The rectal sigmoid junction polyp was tubular/tubulovillous adenoma with high grade dysplasia negative for invasive malignancy. The hepatic flexure polyp was also tubular/tubulovillous adenoma negative for high grade dysplasia or malignancy. The biopsy from the esophagus reports squamocolumnar mucosa with inflammatory changes suggestive of mild reflux esophagitis, negative for interstitial metaplasia. Gastric biopsy was benign and duodenal biopsy was also benign. There is no mention of H-pylori.     Patient was subsequently referred to colorectal surgeon Padmaja Ye MD for further evaluation. The patient had CT scan examination for chest, abdomen and pelvis requested by Dr. Ye and were done on 07/13/2019. The study reported no evidence of lymphadenopathy in the abdomen and pelvis. There was wall thickening of the rectosigmoid junction. Normal spleen, pancreas, adrenal glands and kidneys. There was fatty liver infiltration but no focal lymphatic lesions. There was a small 6-7 mm noncalcified nodule in the right upper lobe and a tiny 3 mm subpleural nodule in the right middle lobe. No mediastinal or hilar lymphadenopathy.     Dr. Ye performed staging rectal ultrasound examination. This study reported tumor penetrating through the muscularis propria as T3 disease; there were no lymph nodes identified.    She had a hospitalization in late September 2019 because of newly discovered pulmonary emboli.  She was on prophylactic Lovenox prior to the incident of PE.  Now she is on full dose Xarelto anticoagulation.  Patient reports no further chest pain dyspnea.  She denies bleeding or bruising.  During her hospitalization, she was seen by Dr. Ye, who plans to have surgery 8 to 12 weeks after finishing  radiation therapy.  She finished her radiation on 9/19/2019.     I noticed patient also presented to the emergency room on 10/1/2019 complaining of right flank area pain.  I reviewed the images studies and indeed she has a very small 1 to 2 mm obstructing kidney stone in the UV junction.  Patient is still symptomatic with some pains and dysuria.  She denies fever sweating or chills.    Laboratory study on 10/7/2019 reported normal CBC including hemoglobin 13.1, platelets 301,000, WBC 6170 and ANC 4900 lymphocytes 590 monocytes 480.      The nurse reported malfunction of port-a-catheter on 10/7/2019.  Port study on 10/8/2019 showed fibrin sheath around the distal aspect of the Mediport catheter in the SVC. This does not appear to hinder injection, but does prevent aspiration at this time.    Patient underwent surgical resection of the colon on 12/2/2019 with Dr. Ye.  Pathology evaluation reported residual T3 disease, also 1 out of 14 lymph nodes positive for malignancy.  So this patient in either had at least stage IIIb disease (T3 N1 M0?).      Adjuvant chemotherapy FOLFOX 6 will be started on 1/22/2020.  Laboratory study reported iron saturation 10%, free iron 31 TIBC 319 and ferritin 168.  Her hemoglobin was 11.8, WBC 5600, and platelets 347,000.    Patient was here on 02/12/2020 for cycle 2 of her FOLFOX.  This is delayed x1 week secondary to neutropenia.  The patient ultimately received 3 days worth of Neupogen with recovery of her blood counts.  Of note, the patient struggled with significant nausea without any episodes of vomiting following cycle #1 of chemotherapy resulting in significant weight loss.  She is up 12 pounds over the last week as her appetite has normalized.  We will add Emend to her care plan.    She is having several loose stools per her colostomy and has been hesitant to take Imodium due to prior history of constipation.  I encouraged her to try this with a maximum of 8 tablets/day.  She  denies any infectious symptoms including fevers or chills.  No excess bleeding or bruising noted.  She had the expected cold sensitivity related to the Oxaliplatin for about 3 days following treatment.    Labs from 02/12/2020 demonstrated total white blood cell count of 5.14, ANC of 3.06, hemoglobin of 11.2, platelets of 211,000.  She was found to be iron deficient last week and is intolerant to oral iron secondary to GI distress.  For this reason, she initiated IV iron therapy with Injectafer last week.  She had received her second dose 02/12/2020    Patient has normalized hemoglobin 12.2 on 2/26/2020.    She reported on 5/5/2020 she had a recent visit to the emergency department for what was suspected to be an allergic reaction.  She called our on-call service on Saturday with reports of hand and face swelling.  She was instructed to proceed to the emergency department at which time she was assessed and prescribed a Medrol Dosepak.  She had just completed her 5-FU and was unhooked on Friday, 5/3/2020.  Her symptoms have improved.  She does report persistent hyperpigmentation and mild swelling of the palms of the hands but this is much improved.  It was her right hand specifically that was swollen.  Her facial swelling has resolved.  She continues on Cefdinir nd since has 1 day remaining, she was prescribed cefdinir for a UTI requiring hospitalization at the end of April.  Reports no new symptoms.  Her labs are stable.  She is scheduled for treatment again.    Patient states at this time she is not tolerating her chemotherapy well.     Patient seen in the emergency department on 5/2/2020 for what was suspected to be an allergic reaction.  She called our on-call service on Saturday explaining that she was experiencing hand and face swelling.  She was instructed to proceed to the emergency department at which time she was assessed and prescribed a Medrol Dosepak.  She had just completed her 5-FU and was unhooked on  Friday, 5/1/2020.      She reports since her ED visit on 5/2/2020 her symptoms have not improved. Her hands and feet were swollen upon presenting to the ED and she could barely make a fist. She states that she feels so swollen she is not able to stand it. She states that her skin on the hands and feet are peeling extensively as well, besides changing color to more dark.     She also reports significant fatigue after her first week of the 5-FU treatment but she expected this side effect. She also notices significant increase in her neuropathy to the point that she is not able to even walk around in her home without her house slippers due to irritation from her rugs. She denies and associated nausea or vomiting at this time. She also has episodes of epistaxis every day after the chemotherapy cycle #7.  She does report working full-time during the week of chemotherapy.    Laboratory studies, 5/13/2020, show moderate thrombocytopenia platelets 123,000, low normal WBC 4140 including ANC 2720 and normal hemoglobin 13.4.  Because significant hand-foot syndrome, will decrease both 5-FU and oxaliplatin by 50%, and eliminate bolus dose of 5-FU.    On 5/21/2020 patient had a PET scan performed which showed no convincing evidence of residual disease in abdomen or pelvis, no metastatic disease within the chest or neck.     Cycle #8 FOLFOX 6 was given on 5/13/2020, with 50% dose reduction for both 5-FU and oxaliplatin, and also examination of bolus 5-FU.  She states on 5/27/2020 that with the recent reduction of the chemotherapy she feels significantly better. She has more energy and is able to do her daily routine.      PET scan examination on 5/21/2020 reported no evidence of metastatic disease in chest abdomen pelvis.  There was a stable 6 mm right upper lobe pulmonary nodule.    Laboratory studies on 5/27/2020 showed mild leukocytopenia WBC 3720 but a normal ANC 2250 and lymphocytes 630.  Normal platelets 163,000 and  hemoglobin 12.6.  Chemistry lab reported normal renal function, liver function panel and a electrolytes, elevated glucose 164.    Laboratory studies 6/24/2020, showed normal hemoglobin 13.4 but macrocytosis .9.  Normal platelets 210,000 and WBC 5870.  She had normal CMP.     Patient last time was here in late June 2020.  Since that time she had surgical procedure for low anterior colon resection, coloanal anastomosis on 8/3/2020.  She later developed a perirectal abscess, required surgical drainage on 8/14/2020.  She was discharged on 8/27/2020.    Patient reports to me she still has lower abdominal wall vacuum suction in place.  She denies fever sweating chills.  Performance status is ECOG 1.  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  She does have visiting nurse come to the home for wound care.    CT scan for chest abdomen pelvis on 9/9/2020 reported a new 8 mm noncalcified pulmonary nodule in the left lower lobe.  Otherwise stable right upper lobe 6 mm pulmonary nodule, and no evidence of disease recurrence in the abdomen or pelvis.     Laboratory study on 9/16/2020 reported elevated AST 55, ALT 95, alk phosphatase 143 but normal total bilirubin 0.3.  Chemistry lab otherwise unremarkable.  She has completed normal CBC.      Because of the abnormal CT scan examination for chest abdomen pelvis on 9/9/2020 reported a new 8 mm noncalcified pulmonary nodule in the left lower lobe, we obtained a PET scan examination for further evaluation, which was done on 9/18/2020.  This study reported several pulmonary nodules, some of them are hypermetabolic, newly developed compared to previous PET scan in May 2020.  Certainly does highly suspicious for metastatic disease.  There are also hypermetabolic activity in the abdomen and pelvic area which is hard to differentiate from surgical scar versus metastatic malignancy.    Laboratory study on 10/13/2020 reported normal CBC with Hb 13.9,  platelets 302,000 and WBC 5520 including ANC 3830.  Chemistry lab reported normalized AST 20, alk phosphatase 116 improved ALT 35, and maintains normal bilirubin, with normal electrolytes and liver function panel.     Patient was started on first cycle of palliative chemotherapy FOLFIRI on 10/13/2020.    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  Patient had a new port placed 12/18/2020, and had held her Xarelto for 2 days prior to surgery.  She presented to the ER on 12/21/20 with complaints of facial and arm swelling for 3 days.  She also noted increased shortness of breath.  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  Thrombus within the right internal jugular vein cannot be excluded.  Patient was started on IV heparin, and eventually transitioned to weight-based Lovenox, which she now continues.    PET scan examination on 9/24/2021 reported further shrinking of the tiny right upper lobe pulmonary nodule.  Otherwise no new lesions.  No evidence for metastatic disease in other areas.      Patient had CT scan for chest with IV contrast obtained on 12/14/2021 which reported small tiny stable pulmonary nodules. There is a 4 mm right upper lobe pulmonary nodule. There is also a stable 4 mm left lower lobe pulmonary nodule. There is also stable left upper lobe micronodule.     Laboratory studies today on 12/21/2021 reported normal hemoglobin 15.9 however .0, platelets 218,000 WBC 5360 including neutrophils 3730 lymphocytes 980. Chemistry lab reported normal renal function, electrolytes, glucose, and marginally elevated ALT 55, AST 34 but normal total bilirubin and alk phosphatase.     CT scan for chest abdomen pelvis 6/21/2022 reported sub-6 mm pulmonary nodules either stable or slightly smaller.  No new pulmonary  nodules or evidence for disease progression.  There is no evidence for metastatic disease in the liver however it shows diffuse hepatic steatosis.  There was masslike thickening in the presacral space with the clips or calcification approximately 1.7 cm in greatest AP dimension but stable.    Laboratory study on 9/20/2022 reported normal hemoglobin 15.7 with mild macrocytosis .1.  She has normal CBC and platelets.  She also has unremarkable CMP.  Normal CEA 1.13 ng/mL.    Patient was seen by Dr. Ye, who performed ileostomy takedown on 11/14/2022.  Patient reports that she is recovering.  She still has a small open wound less than 1 cm in diameter, however close to 2 cm deep.  She has been changing dressing herself.  She denies fever sweating chills.    Patient has no other specific complaints.  She has excellent performance status ECOG 0.  She denies chest pain dyspnea cough hemoptysis.  No abdominal pain.  No melena hematochezia.  Patient has been eating well, stable weight.  She works full-time.    She continues Lovenox injections and denies any significant bleeding issues.   No other new problems or concerns.     CT scan for chest abdomen pelvis obtained on 1/10/2023 reported stable small pulmonary nodules, no evidence for active or new disease.    Laboratory study on 1/10/2023 reported normal CBC and normal CMP.  CEA level is 0.99 ng/mL.    CT scan for chest, abdomen, and pelvis on 7/7/2023 reported stable small pulmonary nodules including a left upper lobe 5 mm nodule. There were no new masses, or pleural effusion. No enlarged supraclavicular, axillary, mediastinal, or hilar lymphadenopathy. Mediastinal vasculature normal. There is occlusion of the superior vena around the catheter with body wall  is present. There was no evidence for disease recurrence in the abdomen or pelvis. Bone is also negative for metastatic disease.    Laboratory studies obtained on 07/07/2023 also reported normal  CBC, and normal CMP as well as low level CEA 1.07 ng/mL.    The CT scan for the chest on 10/27/2023 reported enlarging pulmonary nodules bilaterally. The right upper lobe lung nodule increased from 7 x 7 mm to 9 x 8 mm, and the left upper lobe nodule measured 8 x 9 mm from 5 x 6 mm in 04/2023. There is a different left upper lobe nodule 6 x 8 mm from previous 5 x 5 mm. The presacral tissue thickness measures up to 2.3 cm.    A laboratory study on 10/27/2023 reported CEA 1.58 ng/mL, normal CBC, and CMP.  Molecular study of peripheral blood Guardant Reveal is still pending.    The patient presents today on 11/17/2023 for reevaluation to discuss the results of PET scan examination as well as the results of tumor conference. I saw her recently on 11/03/2023 and because of enlarging pulmonary nodules on the CT scan, we requested a PET scan examination. I presented her case yesterday on 11/16/2023 at the [facility].    The patient reports she is at her baseline condition as 2 weeks ago. No specific complaints, no chest pain, dyspnea, cough, etc. She has a regular bowel movement.    Her case was present at the [facility]. on 11/16/2023. The PET scan images and chest CT scan were reviewed in conjunction with her treatment history. The consensus was for patient to receive stereotactic radiation therapy for the right upper lobe lung lesion, and the bigger left upper lobe lesion, and monitor the smaller left upper lobe lesion with further images studies down the line. Also, the conference recommended Guardant Reveal study which we already ordered.     This Guardant Reveal study came back today as negative for ctDNA 0%.        CT of the chest on 2/2/2024 reported shrinking of the right upper lobe lesion from 9 mm to 6 mm, and the left upper lobe lesion also decreased from 9 mm to 6 mm. Otherwise, there are a couple of stable pulmonary nodules, 7 to 8 mm. The right hilar has a small lymph  node that has increased from 6 mm to 8 mm and is otherwise stable. There was no suspicious lesion in the abdomen or pelvis.    Colonoscopy examination 9/11/2024 showed patchy mild inflammation characterized by congestion/edema in the descending colon. Evidence of previous endo coloanal anastomosis in the rectum. Presence of hemorrhoids.      MEDICATIONS    Current Outpatient Medications:     bismuth subsalicylate (PEPTO BISMOL) 262 MG/15ML suspension, Take 15 mL by mouth 4 (Four) Times a Day., Disp: , Rfl:     clonazePAM (KlonoPIN) 1 MG tablet, Take 1 tablet as needed daily for anxiety. May take one additional as needed for severe anxiety., Disp: 45 tablet, Rfl: 3    Cyanocobalamin (VITAMIN B-12 PO), Take 1 tablet by mouth 3 (Three) Times a Week. M-W-F, Disp: , Rfl:     dicyclomine (BENTYL) 20 MG tablet, TAKE 1 TABLET BY MOUTH EVERY 6 (SIX) HOURS AS NEEDED FOR IRRITABLE BOWEL SYMPTOMS (Patient taking differently: Take 1 tablet by mouth Every Evening.), Disp: 360 tablet, Rfl: 2    diphenoxylate-atropine (LOMOTIL) 2.5-0.025 MG per tablet, TAKE 2 TABLETS BY MOUTH 4 TIMES A DAY, Disp: 240 tablet, Rfl: 11    Enoxaparin Sodium (LOVENOX) 100 MG/ML solution prefilled syringe syringe, INJECT 1 ML UNDER THE SKIN INTO THE APPROPRIATE AREA AS DIRECTED EVERY 12 (TWELVE) HOURS., Disp: 60 mL, Rfl: 2    escitalopram (LEXAPRO) 10 MG tablet, Take 1 tablet by mouth Daily. (Patient taking differently: Take 1 tablet by mouth Every Evening.), Disp: 90 tablet, Rfl: 1    famotidine (PEPCID) 20 MG tablet, TAKE 1 TABLET BY MOUTH TWICE A DAY (Patient taking differently: Take 1 tablet by mouth Every Evening.), Disp: 180 tablet, Rfl: 2    HYDROcodone-acetaminophen (NORCO) 5-325 MG per tablet, Take 1 tablet by mouth Every 6 (Six) Hours As Needed for Moderate Pain., Disp: 45 tablet, Rfl: 0    hydrocortisone 2.5 % cream, APPLY RECTALLY 3 TIMES DAILY. INCLUDE APPLICATOR., Disp: , Rfl:     Hydrocortisone, Perianal, (Anusol-HC) 2.5 % rectal cream,  Apply rectally 3 times daily.  Include applicator., Disp: 30 g, Rfl: 1    Loperamide HCl (IMODIUM PO), Take  by mouth As Needed., Disp: , Rfl:     Loratadine 10 MG capsule, Take 1 capsule by mouth Every Evening., Disp: , Rfl:     metoprolol succinate XL (TOPROL-XL) 25 MG 24 hr tablet, TAKE 0.5 TABLETS BY MOUTH EVERY NIGHT, Disp: 45 tablet, Rfl: 2    nystatin (MYCOSTATIN) 925924 UNIT/GM cream, Apply 1 Application topically to the appropriate area as directed 2 (Two) Times a Day., Disp: 30 g, Rfl: 2    nystatin-hydrocortisone-bacitracin in zinc oxide ointment, Apply  topically to the appropriate area as directed Daily., Disp: 60 g, Rfl: 2    octreotide (sandoSTATIN) 100 MCG/ML injection, Inject 2 mL under the skin into the appropriate area as directed Every 8 (Eight) Hours., Disp: 168 mL, Rfl: 1    ondansetron (ZOFRAN) 8 MG tablet, TAKE 1 TABLET BY MOUTH THREE TIMES A DAY AS NEEDED FOR NAUSEA AND VOMITING, Disp: 60 tablet, Rfl: 1    pantoprazole (Protonix) 40 MG EC tablet, Take 1 tablet by mouth Daily., Disp: 90 tablet, Rfl: 1    rosuvastatin (CRESTOR) 5 MG tablet, TAKE 1 TABLET BY MOUTH EVERY DAY, Disp: 90 tablet, Rfl: 0    ALLERGIES:   No Known Allergies    SOCIAL HISTORY:       Social History     Tobacco Use    Smoking status: Every Day     Current packs/day: 1.00     Average packs/day: 1 pack/day for 25.0 years (25.0 ttl pk-yrs)     Types: Cigarettes     Passive exposure: Current    Smokeless tobacco: Never    Tobacco comments:     1 PPD/caffeine use    Vaping Use    Vaping status: Some Days    Substances: Nicotine    Devices: Disposable    Passive vaping exposure: Yes   Substance Use Topics    Alcohol use: Not Currently     Comment: RARELY    Drug use: Never         FAMILY HISTORY:   Mother has positive factor V Leiden mutation, although she did not have thrombosis, mother also is coronary disease with stenting, she also is occasional bruising.    Maternal grandmother had DVT, she had multiple surgical  "procedures.    Patient mother's half-brother had metastatic colon cancer at diagnosis in his 50s.    Maternal great aunt had breast cancer.           Vitals:    11/08/24 1345   BP: 122/80   Pulse: 84   Resp: 16   Temp: 97.9 °F (36.6 °C)   TempSrc: Oral   SpO2: 98%   Weight: 87 kg (191 lb 11.2 oz)   Height: 167.6 cm (66\")   PainSc:   6   PainLoc: Chest  Comment: right side port in chest               11/8/2024     1:46 PM   Current Status   ECOG score 0     Physical Exam  Vitals reviewed.   Constitutional:       Appearance: Normal appearance. She is well-developed.   HENT:      Head: Normocephalic and atraumatic.      Comments:         Nose: Nose normal.   Eyes:      Conjunctiva/sclera: Conjunctivae normal.   Cardiovascular:      Rate and Rhythm: Normal rate and regular rhythm.   Pulmonary:      Effort: Pulmonary effort is normal.      Breath sounds: Normal breath sounds.   Abdominal:      General: Bowel sounds are normal. There is no distension.      Palpations: Abdomen is soft. There is no mass.      Tenderness: There is no abdominal tenderness.   Musculoskeletal:      Right lower leg: No edema.      Left lower leg: No edema.   Skin:     Findings: No rash.   Neurological:      Mental Status: She is alert. Mental status is at baseline.   Psychiatric:         Mood and Affect: Mood normal. Affect is not tearful.       RECENT LABS:  Results from last 7 days   Lab Units 11/08/24  1326 11/02/24  1726   WBC 10*3/mm3 7.52 5.70   NEUTROS ABS 10*3/mm3 4.83 4.22   HEMOGLOBIN g/dL 13.6 15.1   HEMATOCRIT % 40.2 44.4   PLATELETS 10*3/mm3 216 207       Results from last 7 days   Lab Units 11/02/24  1726   SODIUM mmol/L 141   POTASSIUM mmol/L 3.5   CHLORIDE mmol/L 102   CO2 mmol/L 27.5   BUN mg/dL 8   CREATININE mg/dL 0.68   CALCIUM mg/dL 9.6   ALBUMIN g/dL 4.0   BILIRUBIN mg/dL 0.3   ALK PHOS U/L 89   ALT (SGPT) U/L 14   AST (SGOT) U/L 11   GLUCOSE mg/dL 117*             IMAGING:  NM PET/CT Skull Base to Mid Thigh (08/14/2024 " 09:17)     Assessment & Plan      ASSESSMENT:   1.  Metastatic rectal cancer.   Initial rectal ultrasound examination reported T3N0 disease without lymphadenopathy.   CT scan of chest, abdomen and pelvis reported no lymphadenopathy in the abdomen, pelvis or chest. She does have small pulmonary nodule 6-7 mm and 2 mm with indeterminate feature. There is no solid evidence of metastatic disease otherwise.   Based on the CT scan and rectal ultrasound imaging studies, this patient had stage IIA (T3N0M0) disease.   She had PET scan examination on 8/8/2019 which reported a hypermetabolic right upper lobe pulmonary nodule 6 mm with SUV 5.6.  This is suspicious for metastatic disease however it is too small to be biopsied.  This patient may have stage IV disease.   She initiated concurrent radiation with continuous 5-FU on 8/12/2019.  Patient finished concurrent chemoradiation therapy.  Patient underwent surgical resection of the rectal tumor and diverting loop ileostomy on 12/2/2019 with Dr. Ye.  Pathology evaluation reported residual T3 disease, with 1 out of 14 lymph nodes positive for malignancy.  Certainly this patient has at least stage IIIb rectal cancer (T3N1M0?)  On 1/22/2020 adjuvant chemotherapy FOLFOX 6 regimen initiated.   On 2/5/2022 cycle #2 was delayed secondary to neutropenia.  She was given 3 days of Granix.   Emend added with cycle 3.  With improved nausea control  Continuing home Granix x3 days following 5-FU disconnect  3/11/2020 due for cycle 4, however, she is experiencing progressive abdominal pain and occasional fevers.   CT scan performed on 3/13/2020 reveals no evidence of progressive or recurrent disease.  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  Patient hospitalized 4/24-4/26/20 after cycle 5 of chemotherapy with acute UTI.  CT abdomen/pelvis noting fluid collection in the presacral region having diminished in size compared to CT dated 3/13/2020.  Patient was evaluated by Dr. Ye while in  the hospital with further plans to evaluate possible colovaginal fistula following completion of chemotherapy.  Patient did respond to IV antibiotics and discharged home on oral cefdinir.  4/29/2020 cycle 6 of FOLFOX.  Urinary symptoms have resolved   Patient seen in the [facility] ED on 5/2/2020 for what was suspected to be an allergic reaction.  She called our service on Saturday explaining that she was experiencing hand and face swelling.  She was instructed to proceed to the emergency department at which time she was assessed and prescribed a Medrol Dosepak.  She had just completed her 5-FU and was unhooked on Friday, 5/1/2020.  Her symptoms have resolved in the office on assessment, 5/5/2020.  The patient had grade 3 hand-foot syndrome based on symptomology.  Patient had cycle #8 of 5-FU on 5/13/2020. Due to her symptoms and poor tolerance to the 5-FU, her treatment dose will be reduced 50% for oxaliplatin and infusional 5-FU.  Bolus 5-FU will be discontinued..  On 5/21/2020 patient had a PET scan and it showed no evidence of of metastatic disease in the neck, chest, abdomen or pelvis.  There was fluid accumulation/abscess.   On 5/27/2020 discussed with the patient that we can discontinue chemotherapy at this time.  She will follow-up with Dr. Ye to discuss a possibility to reverse the ileostomy.  We likely will obtain anther PET scan in 3 to 4 months for reassessment.    Patient was seen by Dr. Ye on 6/19/2019 for evaluation and to discuss possible take down of her ileostomy.  She is scheduled to have a gastrografin enema on 7/2/2020 to evaluate for a fistula, and then a colonoscopy on 7/15/2020, both done by Dr. Ye.  She states that based on the above imaging and procedure findings, she may have a more extensive surgery done or just have her ileostomy reversed, which would likely be done in August 2020.  This will all be coordinated under the care of Dr. Ye.     CT scan from 09/09/2020 and also  compared to her previous PET scan examination from 05/21/2020 and also the original PET scan from 08/09/2019.  The original PET scan there is a small right upper lobe pulmonary nodule 6 mm with SUV 5.6. So in the 05/2020 PET scan the nodule is still there but activity seems much less and this most recent CT scan examination also documented the preexisting small pulmonary nodule however there is a new left lower lobe pulmonary nodule 8 mm in size and I shared with the patient today this nodule is suspicious for metastatic disease. Laboratory studies reported minimal CEA level 1.32 on 09/09/2020. Liver function panel was only marginally abnormal with the ALT 45, the remaining is normal. However laboratory study today showed worsening AST 55, ALT 95 and alkaline phosphatase 143 but is still normal, total bilirubin 0.3.   Recommended to have repeat PET scan examination for assessment because the left lower lobe pulmonary nodule is too small to be biopsied, and if the PET scan reports hypermetabolic lesion, I recommended to have stereotactic radiation therapy. Explained to patient that she is not a really good candidate to have thoracotomy for resection because she still has unhealed wound in the abdomen. Stereotactic radiation therapy will likely be a more feasible choice.   PET scan examination obtained on 9/18/2020 showed metastatic disease.  It reported a few hypermetabolic pulmonary nodules, and some new small pulmonary nodules, all of them highly suspicious for metastatic disease.  She also has hypermetabolic activity in the abdomen and pelvic area which could be related to scar tissue from her recent surgery versus metastatic disease.  Nevertheless overall picture fits with metastatic cancer.  Discussed with the patient on 9/20/2020, we reviewed the PET scan images together, and I recommended to have systematic chemotherapy, I do not think stereotactic reading therapy to the hypermetabolic lesions in the lungs  warranted at this time because even those will be treated with reading therapy, she will still need systematic chemotherapy.  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  I did discuss with the patient using anti-EGFR monoclonal antibody versus anti-VEGF monoclonal antibody.     Palliative chemotherapy cycle#1 FOLFIRI was started on 10/13/2020.  No bolus 5-FU given considering previous poor tolerance.    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   Discussed with the patient 10/27/2020, because of the K-s[place] mutation, she is not a candidate for anti-EGFR monoclonal antibody such as Erbitux or vectibix.  She could be a candidate for anti-VEGF monoclonal antibody, however because of active wound, she is not a candidate right now at this moment.  Patient seen in the ED for acute right neck pain on 11/16/2020.  CT angiogram as well as CT of the cervical spine performed with no acute findings but notably one of her pulmonary nodules, left upper lobe, somewhat decreased in size.  Patient given prednisone pack.  Further details below.  Cycle #6 chemotherapy will be resumed on 1/5/2021.  Started back on original irinotecan.  PET scan examination obtained on 1/12/2021 after cycle #6 chemotherapy reported improved pulmonary nodule hypermetabolic activity.  Confirmed these are metastatic lesions.  Patient is evaluated 1/19/2020, will continue cycle #7 FOLFIRI chemotherapy.  However Avastin will be on hold see next section.   Patient was reevaluated on 2/2/2021, will continue chemotherapy cycle #8 FOLFIRI.  Avastin / biosimilar will be added.    She talked to [facility] specialist in mid February 2021, and had recommended palliative chemotherapy without surgical intervention of metastatic lung lesions.   On 3/2/2021, patient will proceed with cycle #10 FOLFIRI plus bevacizumab.  After 12  cycles, plan to start maintenance treatment.  3/16/2021 cycle 11.  Plus bevacizumab.  3/30/2021 cycle 12 FOLFIRI plus bevacizumab.  Having issues with worsening nausea despite premeds with dexamethasone, Aloxi, Emend, and Zofran at home.  Patient is requesting a dose of Phenergan, which is helped her in the past.  We will give this to her with treatment today.  PET scan examination on 4/6/2021 reported no evidence of hypermetabolic metastatic lesion.  Discussed with the patient on 4/13/2021, we reviewed the PET scan results.  We recommended to switch to maintenance chemotherapy without irinotecan.  We will continue 5-FU and Avastin every 2 weeks.  We discussed possibility of switching to oral Xeloda treatment.  Discussed with the patient for side effects more so with hand-foot syndrome.  Patient is agreeable.   Xeloda 2000 mg twice daily 7 days on, 7 days off along with Avastin initiated 4/27/2021.  After only 5 doses of Xeloda significant hand-foot syndrome, Xeloda held. Patient requesting to be transitioned back to infusional 5-FU, as she felt that she tolerated infusional 5-FU without any problem.  The patient will receive 5-FU leucovorin and Avastin every 2 weeks.  Xeloda discontinued.  5/25/2021 continued cycle #4 maintenance 5-FU, leucovorin, Avastin tolerating this much better so far, we will continue this. Recommended to have 12 cycles of maintenance chemotherapy, then obtain PET scan for reevaluation.  We could discuss further treatment plan at that time.  9/14/2021 patient due for cycle 12 of additional maintenance 5-FU/leucovorin plus Avastin.  She continues to tolerate treatment well overall.  She is anxious in the office today with her Mediport not getting blood at first and also with upcoming scans being heavy on her mind.  She was instructed to take one of her Klonopin pills while here to help calm her mind.  Heart rate did improve from 140s down to 112.  Proceed with treatment today as  scheduled.  PET scan examination on 9/24/2021 reported further shrinking of the right upper lobe tiny pulmonary nodule.  No other new lesions.  Discussed with patient on 9/24/2021 about further shrinking of the right upper lobe pulmonary nodule and no evidence for other new lesions. We recommended to have chemo break for 3 months with repeated CT scan for reevaluation.  Recent CT scan for chest 12/14/2021 and abdomen CT from 11/29/2021 reported no evidence for active disease. The right upper lobe pulmonary nodule, left lower lobe pulmonary nodule minimal about 4 mm and stable. I discussed with patient today on 12/21/2021, recommended to give her another 3 months chemotherapy holiday and obtain CT scan afterwards for reevaluation. After discussion, patient is agreeable.   CT scan examination for chest abdomen and pelvis obtained on 3/15/2022 reported a slight increase of the left upper lobe pulmonary nodule 5 mm, from previous 3 mm.  The other pulmonary nodules were stable in size.  There is also small left upper lobe groundglass changes.   Dr. Nguyen reviewed images studies with the patient 3/23/2022, compared to multiple previous images including CT chest CT and PET scan, suspected disease progression.  Discussed with the patient, and I recommended to resume maintenance chemotherapy with 5-FU plus leucovorin plus Avastin repeating every 2 weeks, and obtaining CT scan for reassessment in 3 months.  Patient is agreeable.  Patient resumed maintenance chemotherapy on 3/29/2022 with 5-FU leucovorin and Avastin.   4/26/2022, proceed with cycle #27 maintenance 5-FU, leucovorin, and Avastin.  Patient continues to tolerate treatment well.    On 5/10/2022, we will proceed ahead with cycle #28 maintenance chemotherapy.  Plan to have CT scan after cycle #30.  5/24/2022 cycle 29 maintenance chemotherapy.  Continue to tolerate well.  6/7/2022 cycle #30 maintenance chemotherapy, continuing to tolerate well.   CT scan for chest  abdomen pelvis with IV contrast obtained on 6/21/2022 reported sub-6 mm pulmonary nodules either stable or slightly smaller.  We reviewed the images with the patient together.  We discussed with the patient and recommended to hold chemotherapy for now with repeating CT scan in 3 months for reassessment.  CT scan of the chest abdomen pelvis 9/13/2022 reported stable condition, no evidence for recurrent or metastatic colon cancer.  On 1/10/2023 patient had a CT chest abdomen pelvis with reported no evidence for disease progression.  Laboratory study also showed normal CEA.   Patient had a CT scan for chest, abdomen, and pelvis obtained on 04/11/2023. This study reported very small pulmonary nodules, the right upper lobe nodule is stable to 6 mm, however, the left upper lobe and the left lower lobe nodule increased to 5 mm from previous 4 mm. I discussed with the patient today on 04/19/2023, I recommend to obtain another CT scan in 3 months for reassessment. The nodules are so small, they likely will not be picked up by PET scan examination.  CT scan examination of the chest, abdomen, and pelvis obtained on 7/7/2023 reported stable small pulmonary nodules. No evidence for disease progression or new lesions in chest, abdomen, and pelvis.  Discussed with patient today on 7/14/2023, however, patient is concerning for disease progression. I recommended to obtain Guardant Reveal for circulating tumor DNA study. If the study is positive, we will further obtain PET scan examination, otherwise, we will obtain CT scan for chest, abdomen, and pelvis in 3 months for reassessment.  The patient had CT scan for the chest, abdomen, and pelvis obtained on 10/27/2023, which reported worsening pulmonary nodules at bilateral upper lobes. Laboratory studies showed low normal CEA level and normal liver function panel. The Guardant Reveal study is still pending. I discussed this with the patient on 11/03/2023 and we shared the images, and I  recommended having a PET scan examination for further assessment. I will present her case at the multimodality lung conference. If those lesions are deemed to be metastatic lesions, I recommend having stereotactic radiotherapy. I discussed it with the patient, and she voiced understanding and was agreeable with that strategy.  I presented her case at the multimodality chest conference 11/16/2023.  The consensus was for patient to receive stereotactic radiation therapy for the right upper lobe lung lesion, and the bigger left upper lobe lesion, and monitor the smaller left upper lobe lesion with further images studies down the line. Also, the conference recommended Guardant Reveal study which we already ordered. I discussed with the patient today on 11/17/2023, we explained to the patient that the opinion of the conference and she is agreeable with this and prefers this strategy because of limited toxicity compared to long term commitment to starting chemotherapy again. I recommend to obtain a CT scan for the chest, abdomen, and pelvis with both IV and oral contrast for reassessment in 3 months for reassessment of metastatic lung lesions and thickness in the pelvis where the PET scan reported a low activity SUV 2.4 in the surgical bed.   The patient had steroid-induced radiation therapy for the right upper lobe and one of the left upper lobe lesions.  Her CT scan examination on 02/02/2024 reported shrinking nodules of the right upper lobe and one of the left upper lobe lesions, both from 9 mm down to 6 mm. There are 2 stable pulmonary nodules 7 mm. Nothing new.  02/09/2024, I discussed with the patient and recommended CT scan for the chest, abdomen, and pelvis in 3 months for reassessment. Guardant Reveal study was 0% ctDNA. We will also continue laboratory studies every 3 months for Guardant Reveal, together with routine labs, CBC, CMP, and CEA.  CT scan of the chest, abdomen, and pelvis conducted on 4/23/2024  revealed stable multiple pulmonary nodules, with no other new pulmonary nodules or evidence of disease recurrence or abnormal pelvis. The patient's liver function panel was normal, and low-level CEA level was also noted. The Guardant Reveal study was able to be obtained earlier due to regulatory rules, and we hugo obtain today the Guardant reveal study, be available within 10 to 14 days.   Given the patient's metastatic liver lesion, and lung lesions from colon cancer, careful monitoring is necessary. A chest CT scan with IV contrast will be arranged in 3 months for reevaluation. The study will be reviewed again in 3 months, which will include CBC, CMP, and CEA level.   Imaging study with chest CT scan on 08/02/2024 reported multiple bilateral pulmonary nodules that are relatively stable. However, the Guardant Reveal reported positive ctDNA indicating disease progression. A subsequent PET scan examination on 08/14/2024 reported newly developed hypermetabolic pulmonary nodules. The highest SUV is 3.7, corresponding to an 8 mm new right upper lobe nodule, and there are several other hypometabolic nodules in the lungs.   8/21/2024 resumption of chemotherapy with 5-FU bevacizumab  Triage visit 8/28/2024 with intractable diarrhea that was unclear if this is secondary to chemotherapy or infectious etiology given her known chronic colitis, fever and abdominal pain.  Further management as outlined below  9/4/2024 per review today diarrhea has improved though she is having some difficulty with passage of gas and stool,?  Related to significant inflammation in the rectum versus tumor obstruction.  The passage of gas has improved in the last few days but she does still have to change positions on the toilet to get stool to pass without it being painful.  Discussed all of this with Dr. Nguyen and we will refer the patient urgently back to Dr. Ye for at the very least rectal examination in the office versus full repeated  colonoscopy.  Because of the potential for examination or invasive procedures, we will hold bevacizumab today.  Because the patient had significant diarrhea last week and concerns that it may be related to chemotherapy we will decrease her 5-FU/leucovorin today by 30%.  If she does well we can go back to full dose with cycle 3.     9/18/2024 she presented for evaluation prior to cycle #3 of palliative chemotherapy with 5-FU, leucovorin, and bevacizumab. Given her recent biopsy on 09/11/2024, it is prudent to postpone the Avastin treatment today to ensure safety.  Chemotherapy will be proceeded.      She presented for evaluation on 10/02/2024, tolerating chemotherapy except for diarrhea. This has been managed with octreotide. Proceed with cycle 4 chemotherapy today with 5-FU, leucovorin and resumption of bevacizumab.   Reevaluation 10/16/2024 due for cycle 5 5-FU, leucovorin, bevacizumab.  Due to ongoing significant diarrhea, bevacizumab will be placed on hold for potential surgical intervention.  Additionally, 5-FU will be dose reduced to 50%.  Additional adjustments as outlined below  Patient reviewed 10/30/2024 with improved tolerance to cycle five 5-FU, leucovorin.  We we will attempt to slightly escalate 5-FU dosing, increasing by 10% (40% dose reduction)  Patient did experience leakage of 5-FU, and return 10/31/2024 for evaluation of this.  She was sent for port dye study that showed correct location of her port, so 5-FU was resumed.    2.  Intractable diarrhea due to chemotherapy.   Patient developed diarrhea on Saturday, 8/24/2024.  Is unclear if this is related to infection as she did have fevers at the time the diarrhea began or chemotherapy.  She does have chronic colitis noted on most recent PET scan, this therefore could be diverticulitis flare  GI panel and C. difficile negative  8/29/2024 she did receive a single dose of octreotide  Begin empiric Flagyl 500 mg 3 times daily x 10 days  8/30/2024  discussed negative stool studies.  We will add Levaquin to already prescribed Flagyl to complete management of diverticulitis.  It is unclear if the patient's symptoms are related to diverticular flare given her previous abdominal pain and fever versus chemotherapy.  However, her symptoms are currently improved  9/4/2024 diarrhea has resolved.  Stools are now more soft with some form but she is having difficulty with passage of stool, having to change positions on the toilet to avoid pain.  We are referring back to Dr. Ye as detailed above.  She will finish out the Flagyl and Levaquin as prescribed having roughly 3 days left of both.  We will also adjust doses of 5-FU/leucovorin today with concern for potential chemotherapy-induced diarrhea.  If she does well this cycle we can increase back to full dose with cycle 3 per Dr. Nguyen.  On 9/18/2024 patient reports that she experienced significant diarrhea during cycle #2 chemotherapy on 09/04/2024, leading to a 30% dose reduction. Despite taking Lomotil 2 tablets four times a day, Imodium, and Pepto-Bismol, her diarrhea persisted. She received octreotide shots twice, which improved her symptoms from watery to mushy stools but did not fully resolve the issue. She will continue with the current regimen and consider adding octreotide to her home regimen if diarrhea starts at home. The potential side effects of octreotide, including nausea, were discussed.   10/1/2024 she reports using octreotide self-injection at home, which has significantly improved her diarrhea. Most of the time, she only requires it once a day and occasionally twice a day, depending on the severity of her diarrhea. She is satisfied with the results, which have improved her quality of life and ability to eat properly.   She will also use Lomotil and the Imodium as needed.  Patient is very tearful in the office 10/16/2024 regarding debilitating diarrhea and interference with quality of life.  She  questions potential surgical intervention and placement of colostomy due to ongoing pain in her rectum and burning of her skin from diarrhea.  We discussed adjustments today including increased dose of octreotide to 200 mcg 3 times daily for diarrhea.  Chemotherapy dose adjustments.  Area was slightly improved following most recent cycle of chemotherapy.  Continue supportive care      3.   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  Low-dose Lovenox discontinued and initiated Xarelto 20 mg daily.  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  Patient held Xarelto for 2 days prior to MediPort removal from the left chest wall and placement of new port in the right chest wall on 12/18/2020.  She resumed Xarelto that evening.  Progressive swelling in the neck, face, upper extremities prompting hospitalization with CT scan showing thrombosis in the right subclavian vein and SVC.  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  Although she had been off of Xarelto for a few days, clearly Xarelto was not prohibiting progression of her thrombosis after she resumed treatment and there was some evidence to suggest thrombosis had been present at least in the innominate vein on prior scan in September but now appears chronic.  Current acute thrombosis involving SVC and right subclavian.  Patient was admitted and placed on heparin drip  Patient was evaluated by  vascular surgery and intervention was not recommended for thrombolysis/thrombectomy.  On 12/22/2020, the patient developed worsening shortness of breath, increasing upper extremity edema consistent with worsening SVC syndrome.  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  Symptoms improved and patient was discharged 12/24/2020 on Lovenox 100 mg every 12 hours.    Returns 12/29/2020 for evaluation continuing Lovenox, overall tolerating it well.  No bleeding issues.  Improved edema 1/5/2021.  Will continue Lovenox weight-based every 12 hours.  On 1/19/2021 and 2/2/2021, patient reports improved facial swelling.  She however does have being bruise on her abdomen wall where she does Lovenox injection.  However she does not have a spontaneous epistaxis, gum bleeding or other bleeding.   On 2/2/2021, we discussed that side effects of Avastin/biosimilar related to thrombosis.  Since this patient already has been on Lovenox, I think the benefits from treatment for her cancer will outweigh that risk of thrombosis, will proceed ahead with Avastin.  I cautioned patient to watch out for signs of worsening thrombosis patient voiced understanding.   5/11/2021 Lovenox dosing will be adjusted due to patient's weight loss.  We discussed she needs 1 mg/kg.  Her syringes are 100 mg syringes she will do 0.9 mL subcu every 12 hours.  8/3/2021 Patient continues to have bruising on abdomen from lovenox injections.  Will need to monitor weight and adjust dosing in future if further weight loss.   Stable condition on 9/28/2021.  Continue Lovenox anticoagulation.    Patient reports no chest pain no dyspnea no leg edema. However she had bruising and also most recently abnormal small abscess for which she actually went to ER on 11/29/2021, had I&D. The wound is healing. No further drainage. Denies fever sweating or chills. After  discussion, she will continue Lovenox injection for now.   On 3/23/2022, patient reports good tolerance of Lovenox.  Nevertheless she still has small quantity of pus in the left lower abdomen abscess, she squeezes it every day to prevent it became worse.  She reports no fever sweating chills.  Recommended to start Augmentin 875 mg twice a day for 7 days.  She will continue Lovenox indefinitely.  On 4/12/2022, patient reports resolution of the small abscess at left lower abdominal wall.   On 5/10/2022, patient continues on Lovenox indefinitely.  No easy bleeding or bruising.  6/23/2022 continuing on Lovenox 1 mg/kg at a dose of 100 mg (patient weight is 96.6 kg today) every 12 hours.  On 1/17/2023, patient reports good tolerance, Lovenox will be continued.    Patient had a venous Doppler study on 02/05/2023, which reported acute left upper extremity DVT in the subclavian vein, axillary and the brachial vein, and also superficial thrombophlebitis in the basilic upper arm.   On 04/19/2023, patient reports that she was not compliant with anticoagulation at the time of recurrent DVT. She now is fully compliant and is using Lovenox.  Continues on anticoagulation without signs or symptoms of bleeding.  She does have occasional irritation and bleeding with wiping related to frequent diarrhea  Due to right sided pleuritic pain the patient was seen in the emergency department 10/22/2024 with a negative CT angiogram.  Without thrombosis.  Anticoagulation continued.  Patient had CTA again on 11/2/2024 due to continued pleuritic pain that was negative for PE.      4.  Mild anemia.   She also has history of iron deficiency.  Iron studies reveal iron saturation of 10%.  She was started on oral iron but was unable to tolerate due to GI side effects.   Status post Injectafer 2/5/2020 and 2/12/2020   Improved hemoglobin 13.5 on 1/5/2021.  3/16/2020 when hemoglobin now up to 16.2, hematocrit 47.6.  Patient admits that she has started  smoking again, and I have encouraged her to quit.  She denies any snoring or sleep apnea diagnosis.  Patient is not taking oral iron.  We will closely monitor.  3/30/2020 hemoglobin 15.7, HCT 47.5.  Patient states that she has cut back significantly on her smoking, although not quit yet.  I have encouraged her to continue working on this.  We will continue to closely monitor.  Hemoglobin 14.6 on 6/8/2021 at the meantime he has worsening macrocytosis .6.    Laboratory studies 6/22/2021 reported excellent folate > 20 ng/mL, however low normal B12 level 357 pg/mL.    On 7/6/2021, normal hemoglobin 15.8, .9 and HCT 47.2%.  Patient was asked to start oral vitamin B12 at 1000 mcg daily.   9/14/2021 hemoglobin 17.0, hematocrit 52.1.  Monitor.  On 9/28/2021 hemoglobin 16.1 hematocrit 48.5%, continue to monitor.   Lab study on 12/14/2021 reported excellent ferritin 296 and iron saturation 25% with free iron 104 TIBC 424. Hemoglobin was 15.2 with .2.   Normal hemoglobin 14.6 on 3/15/2022.  Iron study reported normal ferritin 129.5 ng/mL however slightly low iron saturation 11%.  Continue to monitor for now.  9/4/2024 hemoglobin is normal at 14.0  Hemoglobin today 13.6.    5.  Peripheral neuropathy secondary to chemotherapy.   This is mild involving bilateral hands and feet as reported on 9/16/2020.   Will avoid chemotherapy with oxaliplatin.  Stable mild neuropathy as of 11/10/2020  Patient reports worsening peripheral neuropathy and cycle #5 chemotherapy on 12/8/2020 with and without irinotecan.   On 1/5/2021, patient reports improvement of peripheral neuropathy, will add irinotecan back to chemotherapy.  Continue to monitor and adjust medication.  Stable.      6.  Depression.  Patient seen by JULIET Davenport on 11/9/2020.  She was started on mirtazapine.  Lexapro was discontinued.  This has definitely improved her mood with the patient feeling overall much better.  She is sleeping better.  Appetite  is improved with her actually eating more than she wants to.    Condition is stable.  She plans to continue follow-up with supportive oncology.  Patient is very tearful in the office today regarding need for dose adjustment to chemotherapy due to significant toxicity.    7. Malfunction of the portal catheter  Patient reports there was no blood drawn from the portal catheter. CT scan of the chest on 7/7/2023 reported occlusion of the SVC around to the Port-A-Cath tubing. I recommend trying activase to see if it helps.  Otherwise, we may have to remove the catheter. Clinically, patient has no signs of SVC syndrome.  On 11/03/2023, the patient reports that her Port-A-Cath was able to be used for a CT scan for the injection of intravenous dye; however, there was no blood return, so we needed to draw from her arm for the blood test. We will continue to keep Port-A-Cath for now.  On 02/09/2024, the patient reports that the port was able to be flushed. However, no blood was returned. We will continue to flush every 6 weeks.  9/4/2024 she continues to have no blood return from her port but can taste saline as we flush at each time and is functioning well otherwise.  Monitor.  10162/2024 no specific problem.  Port dye study 10/31/2024 showing correct position of port.  Fibrin sheath present.    8. Internal hemorrhoids.  She has large internal hemorrhoids diagnosed during a flexible sigmoidoscopy on 09/11/2024. Hydrocortisone cream was prescribed for treatment.     9.  This patient also has strong family history for malignancy   The patient had appointment with genetic counseling on September 3, 2019.  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    10. Hypertension.  Continue management of hypertension and follow up with PCP.    *Chest and back pain  Ports this has been ongoing for about 4 weeks.  Started in her right chest/rib cage.  Originally this pain had completely resolved, however returned when she received her next  chemotherapy infusion. She felt like the pain worsened after she was unhooked from her 5-FU and that the pain spread to her right scapular/back area.  Since unhook she has continued with pain in her right chest/back that has required her to take pain medicine regularly which is not typical for her.  She has pain with deep inspiration and pain with certain movements.  She also has pain when she pushes on the tissue around her port, though there is no redness or warmth around her port site aside from red rash in the shape of a bandaid and she reports being sensitive to bandaids.  Reviewed all of patients symptoms with Dr. Nguyen who recommended obtaining doppler right upper extremity and PET scan for further evaluation of this severe right sided pain that has been evaluated by CTA without any signs of what could be causing her pain.  Of note she was seen in the ED 11/2/2024, had CT chest performed which was negative for PE.  EKG and troponin looked okay.  She was discharged home.    PLAN:    Order placed for doppler right upper extremity to be performed Monday  Order for PET to be completed Monday for evaluation of severe pain patient is having in her right chest and back.    Continue octreotide, currently utilizing 100 mcg every 8 hours following chemotherapy  Continue Imodium and Lomotil as needed.  Continue Protonix 40 mg daily.   Continue Gas-X.   Continue Magic barrier cream rectum as needed   Continue anticoagulation with Lovenox subcu injection every 12 hours.   Return Wednesday for CBC, CMP, review of [id number] and MD evaluation with Dr. Nguyen  Pending tolerance to chemotherapy we can consider referral back to Dr. Ye for colostomy as the patient's diarrhea is quite debilitating and negatively impacting her quality of life. For this reason bevacizumab remains on hold until MD follow up    The patient is on a high risk medication requiring close monitoring for toxicity.  Patient reviewed with Dr. Nguyen who  is in agreement with today's plan of care.    I spent 78 minutes caring for Carole on this date of service. This time includes time spent by me in the following activities: preparing for the visit, reviewing tests, obtaining and/or reviewing a separately obtained history, performing a medically appropriate examination and/or evaluation, counseling and educating the patient/family/caregiver, ordering medications, tests, or procedures, documenting information in the medical record, and care coordination.       Clari Charles, JULIET  11/08/2024        CC:  Deepika Vela III NP-MD Alverto Rahman MD

## 2024-11-07 NOTE — TELEPHONE ENCOUNTER
Patient calling stating last week she was connected to her 5 FU on 10/30/24 and it started leaking. She returned to the [facility] office on 10/31/24 and was sent for a port dye study. No problems were identified. Patient was reconnected to 5 FU and patient was disconnected at [address] on Saturday 11/1/24. Later that afternoon patient went to the ER with severe pain in right chest/breast area. Imaging done but no immediate concerns were identified and patient was discharged. Patient states pain has never completely gone away but until this morning pain was better.   Patient states this morning pain is worse and now not only does she have pain in the right chest/breast area but is radiating to her back. Patient verbalizes the pain is so pain that when she walks the pain is so intense that is causes her to gasp. Patient denies redness around her port site but states there is swelling and pain especially if you press on the port the pain is worse.  Explain to patient will speak to Dr Nguyen and call her back.    Patient v/u

## 2024-11-08 ENCOUNTER — LAB (OUTPATIENT)
Dept: LAB | Facility: HOSPITAL | Age: 44
End: 2024-11-08
Payer: COMMERCIAL

## 2024-11-08 ENCOUNTER — OFFICE VISIT (OUTPATIENT)
Dept: ONCOLOGY | Facility: CLINIC | Age: 44
End: 2024-11-08
Payer: COMMERCIAL

## 2024-11-08 VITALS
OXYGEN SATURATION: 98 % | SYSTOLIC BLOOD PRESSURE: 122 MMHG | RESPIRATION RATE: 16 BRPM | WEIGHT: 191.7 LBS | TEMPERATURE: 97.9 F | DIASTOLIC BLOOD PRESSURE: 80 MMHG | HEIGHT: 66 IN | BODY MASS INDEX: 30.81 KG/M2 | HEART RATE: 84 BPM

## 2024-11-08 DIAGNOSIS — C78.00 MALIGNANT NEOPLASM METASTATIC TO LUNG, UNSPECIFIED LATERALITY: ICD-10-CM

## 2024-11-08 DIAGNOSIS — Z79.01 CHRONIC ANTICOAGULATION: ICD-10-CM

## 2024-11-08 DIAGNOSIS — R07.81 RIB PAIN ON RIGHT SIDE: ICD-10-CM

## 2024-11-08 DIAGNOSIS — C20 ADENOCARCINOMA OF RECTUM: Primary | ICD-10-CM

## 2024-11-08 DIAGNOSIS — G89.3 CANCER ASSOCIATED PAIN: ICD-10-CM

## 2024-11-08 DIAGNOSIS — R07.9 ACUTE CHEST PAIN: ICD-10-CM

## 2024-11-08 DIAGNOSIS — C20 ADENOCARCINOMA OF RECTUM: ICD-10-CM

## 2024-11-08 LAB
BASOPHILS # BLD AUTO: 0.08 10*3/MM3 (ref 0–0.2)
BASOPHILS NFR BLD AUTO: 1.1 % (ref 0–1.5)
DEPRECATED RDW RBC AUTO: 56.3 FL (ref 37–54)
EOSINOPHIL # BLD AUTO: 0.35 10*3/MM3 (ref 0–0.4)
EOSINOPHIL NFR BLD AUTO: 4.7 % (ref 0.3–6.2)
ERYTHROCYTE [DISTWIDTH] IN BLOOD BY AUTOMATED COUNT: 15.9 % (ref 12.3–15.4)
HCT VFR BLD AUTO: 40.2 % (ref 34–46.6)
HGB BLD-MCNC: 13.6 G/DL (ref 12–15.9)
IMM GRANULOCYTES # BLD AUTO: 0.25 10*3/MM3 (ref 0–0.05)
IMM GRANULOCYTES NFR BLD AUTO: 3.3 % (ref 0–0.5)
LYMPHOCYTES # BLD AUTO: 1.2 10*3/MM3 (ref 0.7–3.1)
LYMPHOCYTES NFR BLD AUTO: 16 % (ref 19.6–45.3)
MCH RBC QN AUTO: 33.3 PG (ref 26.6–33)
MCHC RBC AUTO-ENTMCNC: 33.8 G/DL (ref 31.5–35.7)
MCV RBC AUTO: 98.3 FL (ref 79–97)
MONOCYTES # BLD AUTO: 0.81 10*3/MM3 (ref 0.1–0.9)
MONOCYTES NFR BLD AUTO: 10.8 % (ref 5–12)
NEUTROPHILS NFR BLD AUTO: 4.83 10*3/MM3 (ref 1.7–7)
NEUTROPHILS NFR BLD AUTO: 64.1 % (ref 42.7–76)
NRBC BLD AUTO-RTO: 4.4 /100 WBC (ref 0–0.2)
PLATELET # BLD AUTO: 216 10*3/MM3 (ref 140–450)
PMV BLD AUTO: 9.6 FL (ref 6–12)
RBC # BLD AUTO: 4.09 10*6/MM3 (ref 3.77–5.28)
WBC NRBC COR # BLD AUTO: 7.52 10*3/MM3 (ref 3.4–10.8)

## 2024-11-08 PROCEDURE — 85025 COMPLETE CBC W/AUTO DIFF WBC: CPT

## 2024-11-08 PROCEDURE — 36415 COLL VENOUS BLD VENIPUNCTURE: CPT

## 2024-11-11 DIAGNOSIS — C20 ADENOCARCINOMA OF RECTUM: Chronic | ICD-10-CM

## 2024-11-12 ENCOUNTER — HOSPITAL ENCOUNTER (OUTPATIENT)
Dept: CARDIOLOGY | Facility: HOSPITAL | Age: 44
Discharge: HOME | End: 2024-11-12
Payer: COMMERCIAL

## 2024-11-12 ENCOUNTER — HOSPITAL ENCOUNTER (OUTPATIENT)
Dept: PET IMAGING | Facility: HOSPITAL | Age: 44
Discharge: HOME | End: 2024-11-12
Payer: COMMERCIAL

## 2024-11-12 ENCOUNTER — TELEPHONE (OUTPATIENT)
Dept: ONCOLOGY | Facility: CLINIC | Age: 44
End: 2024-11-12
Payer: COMMERCIAL

## 2024-11-12 DIAGNOSIS — R07.9 ACUTE CHEST PAIN: ICD-10-CM

## 2024-11-12 DIAGNOSIS — Z79.01 CHRONIC ANTICOAGULATION: ICD-10-CM

## 2024-11-12 DIAGNOSIS — C20 ADENOCARCINOMA OF RECTUM: ICD-10-CM

## 2024-11-12 DIAGNOSIS — G89.3 CANCER ASSOCIATED PAIN: ICD-10-CM

## 2024-11-12 DIAGNOSIS — R07.81 RIB PAIN ON RIGHT SIDE: ICD-10-CM

## 2024-11-12 LAB
BH CV UPPER VENOUS LEFT INTERNAL JUGULAR AUGMENT: NORMAL
BH CV UPPER VENOUS LEFT INTERNAL JUGULAR COMPRESS: NORMAL
BH CV UPPER VENOUS LEFT INTERNAL JUGULAR PHASIC: NORMAL
BH CV UPPER VENOUS LEFT INTERNAL JUGULAR SPONT: NORMAL
BH CV UPPER VENOUS LEFT SUBCLAVIAN AUGMENT: NORMAL
BH CV UPPER VENOUS LEFT SUBCLAVIAN COMPRESS: NORMAL
BH CV UPPER VENOUS LEFT SUBCLAVIAN PHASIC: NORMAL
BH CV UPPER VENOUS LEFT SUBCLAVIAN SPONT: NORMAL
BH CV UPPER VENOUS RIGHT AXILLARY AUGMENT: NORMAL
BH CV UPPER VENOUS RIGHT AXILLARY COMPRESS: NORMAL
BH CV UPPER VENOUS RIGHT AXILLARY PHASIC: NORMAL
BH CV UPPER VENOUS RIGHT AXILLARY SPONT: NORMAL
BH CV UPPER VENOUS RIGHT BASILIC FOREARM COMPRESS: NORMAL
BH CV UPPER VENOUS RIGHT BASILIC UPPER COMPRESS: NORMAL
BH CV UPPER VENOUS RIGHT BRACHIAL COMPRESS: NORMAL
BH CV UPPER VENOUS RIGHT CEPHALIC FOREARM COMPRESS: NORMAL
BH CV UPPER VENOUS RIGHT CEPHALIC UPPER COMPRESS: NORMAL
BH CV UPPER VENOUS RIGHT RADIAL COMPRESS: NORMAL
BH CV UPPER VENOUS RIGHT SUBCLAVIAN AUGMENT: NORMAL
BH CV UPPER VENOUS RIGHT SUBCLAVIAN COMPRESS: NORMAL
BH CV UPPER VENOUS RIGHT SUBCLAVIAN PHASIC: NORMAL
BH CV UPPER VENOUS RIGHT SUBCLAVIAN SPONT: NORMAL
BH CV UPPER VENOUS RIGHT ULNAR COMPRESS: NORMAL
GLUCOSE BLDC GLUCOMTR-MCNC: 72 MG/DL (ref 70–130)

## 2024-11-12 PROCEDURE — 78815 PET IMAGE W/CT SKULL-THIGH: CPT

## 2024-11-12 PROCEDURE — A9552 F18 FDG: HCPCS | Performed by: NURSE PRACTITIONER

## 2024-11-12 PROCEDURE — 93971 EXTREMITY STUDY: CPT | Performed by: SURGERY

## 2024-11-12 PROCEDURE — 93971 EXTREMITY STUDY: CPT

## 2024-11-12 PROCEDURE — 82948 REAGENT STRIP/BLOOD GLUCOSE: CPT

## 2024-11-12 PROCEDURE — 34310000005 FLUDEOXYGLUCOSE F18 SOLUTION: Performed by: NURSE PRACTITIONER

## 2024-11-12 RX ORDER — ONDANSETRON 8 MG/1
TABLET, FILM COATED ORAL
Qty: 60 TABLET | Refills: 1 | Status: SHIPPED | OUTPATIENT
Start: 2024-11-12

## 2024-11-12 RX ADMIN — FLUDEOXYGLUCOSE F 18 1 DOSE: 200 INJECTION, SOLUTION INTRAVENOUS at 13:38

## 2024-11-12 NOTE — TELEPHONE ENCOUNTER
----- Message from Patience Lorenzo sent at 11/12/2024  2:10 PM EST -----  Will you call her and let her know her doppler is normal.    Thanks    Dana  ----- Message -----  From: Marcos Chapa MD  Sent: 11/12/2024   2:03 PM EST  To: JULIET Rey

## 2024-11-13 ENCOUNTER — OFFICE VISIT (OUTPATIENT)
Dept: ONCOLOGY | Facility: CLINIC | Age: 44
End: 2024-11-13
Payer: COMMERCIAL

## 2024-11-13 ENCOUNTER — INFUSION (OUTPATIENT)
Dept: ONCOLOGY | Facility: HOSPITAL | Age: 44
End: 2024-11-13
Payer: COMMERCIAL

## 2024-11-13 ENCOUNTER — LAB (OUTPATIENT)
Dept: LAB | Facility: HOSPITAL | Age: 44
End: 2024-11-13
Payer: COMMERCIAL

## 2024-11-13 VITALS
HEART RATE: 87 BPM | HEIGHT: 66 IN | OXYGEN SATURATION: 97 % | BODY MASS INDEX: 30.73 KG/M2 | SYSTOLIC BLOOD PRESSURE: 90 MMHG | DIASTOLIC BLOOD PRESSURE: 66 MMHG | WEIGHT: 191.2 LBS

## 2024-11-13 DIAGNOSIS — T45.1X5A ADVERSE EFFECT OF ANTINEOPLASTIC AND IMMUNOSUPPRESSIVE DRUGS, INITIAL ENCOUNTER: ICD-10-CM

## 2024-11-13 DIAGNOSIS — Z79.899 ENCOUNTER FOR LONG-TERM (CURRENT) USE OF HIGH-RISK MEDICATION: Primary | ICD-10-CM

## 2024-11-13 DIAGNOSIS — C78.00 MALIGNANT NEOPLASM METASTATIC TO LUNG, UNSPECIFIED LATERALITY: ICD-10-CM

## 2024-11-13 DIAGNOSIS — T45.1X5A CHEMOTHERAPY INDUCED DIARRHEA: ICD-10-CM

## 2024-11-13 DIAGNOSIS — C20 ADENOCARCINOMA OF RECTUM: ICD-10-CM

## 2024-11-13 DIAGNOSIS — Z79.899 ENCOUNTER FOR LONG-TERM (CURRENT) USE OF HIGH-RISK MEDICATION: ICD-10-CM

## 2024-11-13 DIAGNOSIS — K52.1 CHEMOTHERAPY INDUCED DIARRHEA: ICD-10-CM

## 2024-11-13 DIAGNOSIS — Z79.01 ANTICOAGULATION ADEQUATE: ICD-10-CM

## 2024-11-13 DIAGNOSIS — D64.9 ANEMIA, UNSPECIFIED TYPE: ICD-10-CM

## 2024-11-13 LAB
ALBUMIN SERPL-MCNC: 3.7 G/DL (ref 3.5–5.2)
ALBUMIN/GLOB SERPL: 1.3 G/DL
ALP SERPL-CCNC: 83 U/L (ref 39–117)
ALT SERPL W P-5'-P-CCNC: 9 U/L (ref 1–33)
ANION GAP SERPL CALCULATED.3IONS-SCNC: 11.9 MMOL/L (ref 5–15)
AST SERPL-CCNC: 11 U/L (ref 1–32)
BASOPHILS # BLD AUTO: 0.01 10*3/MM3 (ref 0–0.2)
BASOPHILS NFR BLD AUTO: 0.2 % (ref 0–1.5)
BILIRUB SERPL-MCNC: 0.2 MG/DL (ref 0–1.2)
BILIRUB UR QL STRIP: ABNORMAL
BUN SERPL-MCNC: 6 MG/DL (ref 6–20)
BUN/CREAT SERPL: 9.8 (ref 7–25)
CALCIUM SPEC-SCNC: 9 MG/DL (ref 8.6–10.5)
CHLORIDE SERPL-SCNC: 107 MMOL/L (ref 98–107)
CLARITY UR: CLEAR
CO2 SERPL-SCNC: 26.1 MMOL/L (ref 22–29)
COLOR UR: YELLOW
CREAT SERPL-MCNC: 0.61 MG/DL (ref 0.57–1)
DEPRECATED RDW RBC AUTO: 60.7 FL (ref 37–54)
EGFRCR SERPLBLD CKD-EPI 2021: 112.5 ML/MIN/1.73
EOSINOPHIL # BLD AUTO: 0.13 10*3/MM3 (ref 0–0.4)
EOSINOPHIL NFR BLD AUTO: 2 % (ref 0.3–6.2)
ERYTHROCYTE [DISTWIDTH] IN BLOOD BY AUTOMATED COUNT: 16.2 % (ref 12.3–15.4)
GLOBULIN UR ELPH-MCNC: 2.8 GM/DL
GLUCOSE SERPL-MCNC: 130 MG/DL (ref 65–99)
GLUCOSE UR STRIP-MCNC: NEGATIVE MG/DL
HCT VFR BLD AUTO: 43.6 % (ref 34–46.6)
HGB BLD-MCNC: 13.8 G/DL (ref 12–15.9)
HGB UR QL STRIP.AUTO: ABNORMAL
IMM GRANULOCYTES # BLD AUTO: 0.02 10*3/MM3 (ref 0–0.05)
IMM GRANULOCYTES NFR BLD AUTO: 0.3 % (ref 0–0.5)
KETONES UR QL STRIP: NEGATIVE
LEUKOCYTE ESTERASE UR QL STRIP.AUTO: NEGATIVE
LYMPHOCYTES # BLD AUTO: 1.15 10*3/MM3 (ref 0.7–3.1)
LYMPHOCYTES NFR BLD AUTO: 17.6 % (ref 19.6–45.3)
MCH RBC QN AUTO: 32.1 PG (ref 26.6–33)
MCHC RBC AUTO-ENTMCNC: 31.7 G/DL (ref 31.5–35.7)
MCV RBC AUTO: 101.4 FL (ref 79–97)
MONOCYTES # BLD AUTO: 0.48 10*3/MM3 (ref 0.1–0.9)
MONOCYTES NFR BLD AUTO: 7.3 % (ref 5–12)
NEUTROPHILS NFR BLD AUTO: 4.76 10*3/MM3 (ref 1.7–7)
NEUTROPHILS NFR BLD AUTO: 72.6 % (ref 42.7–76)
NITRITE UR QL STRIP: NEGATIVE
NRBC BLD AUTO-RTO: 0 /100 WBC (ref 0–0.2)
PH UR STRIP.AUTO: 6.5 [PH] (ref 4.5–8)
PLATELET # BLD AUTO: 170 10*3/MM3 (ref 140–450)
PMV BLD AUTO: 9.2 FL (ref 6–12)
POTASSIUM SERPL-SCNC: 4.1 MMOL/L (ref 3.5–5.2)
PROT SERPL-MCNC: 6.5 G/DL (ref 6–8.5)
PROT UR QL STRIP: ABNORMAL
RBC # BLD AUTO: 4.3 10*6/MM3 (ref 3.77–5.28)
SODIUM SERPL-SCNC: 145 MMOL/L (ref 136–145)
SP GR UR STRIP: 1.02 (ref 1–1.03)
UROBILINOGEN UR QL STRIP: ABNORMAL
WBC NRBC COR # BLD AUTO: 6.55 10*3/MM3 (ref 3.4–10.8)

## 2024-11-13 PROCEDURE — 96413 CHEMO IV INFUSION 1 HR: CPT

## 2024-11-13 PROCEDURE — 25010000002 DEXAMETHASONE SODIUM PHOSPHATE 100 MG/10ML SOLUTION: Performed by: INTERNAL MEDICINE

## 2024-11-13 PROCEDURE — G0498 CHEMO EXTEND IV INFUS W/PUMP: HCPCS

## 2024-11-13 PROCEDURE — 85025 COMPLETE CBC W/AUTO DIFF WBC: CPT

## 2024-11-13 PROCEDURE — 25010000002 PALONOSETRON PER 25 MCG: Performed by: INTERNAL MEDICINE

## 2024-11-13 PROCEDURE — 25010000002 FOSAPREPITANT PER 1 MG: Performed by: INTERNAL MEDICINE

## 2024-11-13 PROCEDURE — 25810000003 SODIUM CHLORIDE 0.9 % SOLUTION: Performed by: INTERNAL MEDICINE

## 2024-11-13 PROCEDURE — 96375 TX/PRO/DX INJ NEW DRUG ADDON: CPT

## 2024-11-13 PROCEDURE — 25010000002 LEUCOVORIN CALCIUM PER 50 MG: Performed by: INTERNAL MEDICINE

## 2024-11-13 PROCEDURE — 25010000002 FLUOROURACIL PER 500 MG: Performed by: INTERNAL MEDICINE

## 2024-11-13 PROCEDURE — 25010000002 BEVACIZUMAB 100 MG/4ML SOLUTION 4 ML VIAL: Performed by: INTERNAL MEDICINE

## 2024-11-13 PROCEDURE — 96367 TX/PROPH/DG ADDL SEQ IV INF: CPT

## 2024-11-13 PROCEDURE — 25010000002 BEVACIZUMAB PER 10 MG: Performed by: INTERNAL MEDICINE

## 2024-11-13 PROCEDURE — 25810000003 SODIUM CHLORIDE 0.9 % SOLUTION 250 ML FLEX CONT: Performed by: INTERNAL MEDICINE

## 2024-11-13 PROCEDURE — 80053 COMPREHEN METABOLIC PANEL: CPT

## 2024-11-13 PROCEDURE — 81003 URINALYSIS AUTO W/O SCOPE: CPT

## 2024-11-13 RX ORDER — LEVOFLOXACIN 750 MG/1
750 TABLET, FILM COATED ORAL DAILY
Qty: 7 TABLET | Refills: 0 | Status: SHIPPED | OUTPATIENT
Start: 2024-11-13

## 2024-11-13 RX ORDER — SODIUM CHLORIDE 9 MG/ML
1000 INJECTION, SOLUTION INTRAVENOUS ONCE
Status: CANCELLED | OUTPATIENT
Start: 2024-11-15

## 2024-11-13 RX ORDER — SODIUM CHLORIDE 9 MG/ML
20 INJECTION, SOLUTION INTRAVENOUS ONCE
Status: COMPLETED | OUTPATIENT
Start: 2024-11-13 | End: 2024-11-13

## 2024-11-13 RX ORDER — SODIUM CHLORIDE 9 MG/ML
20 INJECTION, SOLUTION INTRAVENOUS ONCE
Status: CANCELLED | OUTPATIENT
Start: 2024-11-13

## 2024-11-13 RX ORDER — PALONOSETRON 0.05 MG/ML
0.25 INJECTION, SOLUTION INTRAVENOUS ONCE
Status: CANCELLED | OUTPATIENT
Start: 2024-11-13

## 2024-11-13 RX ORDER — PALONOSETRON 0.05 MG/ML
0.25 INJECTION, SOLUTION INTRAVENOUS ONCE
Status: COMPLETED | OUTPATIENT
Start: 2024-11-13 | End: 2024-11-13

## 2024-11-13 RX ADMIN — FLUOROURACIL 2880 MG: 50 INJECTION, SOLUTION INTRAVENOUS at 12:49

## 2024-11-13 RX ADMIN — LEUCOVORIN CALCIUM 560 MG: 350 INJECTION, POWDER, LYOPHILIZED, FOR SUSPENSION INTRAMUSCULAR; INTRAVENOUS at 12:14

## 2024-11-13 RX ADMIN — SODIUM CHLORIDE 20 ML/HR: 9 INJECTION, SOLUTION INTRAVENOUS at 10:53

## 2024-11-13 RX ADMIN — DEXAMETHASONE SODIUM PHOSPHATE 12 MG: 10 INJECTION, SOLUTION INTRAMUSCULAR; INTRAVENOUS at 11:23

## 2024-11-13 RX ADMIN — FOSAPREPITANT 100 ML: 150 INJECTION, POWDER, LYOPHILIZED, FOR SOLUTION INTRAVENOUS at 10:53

## 2024-11-13 RX ADMIN — BEVACIZUMAB 450 MG: 400 INJECTION, SOLUTION INTRAVENOUS at 11:43

## 2024-11-13 RX ADMIN — PALONOSETRON HYDROCHLORIDE 0.25 MG: 0.25 INJECTION INTRAVENOUS at 10:53

## 2024-11-13 NOTE — PROGRESS NOTES
REASON FOR FOLLOW UP:     Rectal cancer, rectal ultrasound examination in July 2019 reported T3N0 disease without lymphadenopathy. She does have small pulmonary nodule 6-7 mm and 2 mm with indeterminate feature. There is no solid evidence of metastatic disease otherwise. Patient has stage IIA (T3N0M0) disease.  The patient is heterozygous factor V Leiden, was on prophylactic anticoagulation with Lovenox 40 mg daily given her increased risk of thrombosis with MediPort and GI malignancy.   PET scan on 8/8/2019 reported an 8 mm hypermetabolic right upper lobe pulmonary nodule, which is suspicious for metastatic as well.    Patient had a PowerPort placement on the left upper chest by Dr. Joseph on 8/9/2019.  Patient was started on concurrent infusional 5-FU chemoradiation therapy on 8/12/2019, with planned complete surgical resection and further adjuvant chemotherapy with intention to cure the disease.   Patient underwent surgical resection of the primary rectal cancer by Dr. Ye on 12/2/2019.  Pathology evaluation reported residual T3N1 disease stage IIIb.  Diarrhea related to therapy and radiation.   Patient was started cycle 1 day 1 of adjuvant FOLFOX 6 on 1/23/2020.  On 2/5/2020 FOLFOX 6 cycle 2 delayed secondary to neutropenia.  Patient was given 3 days of Granix injection.  After cycle #2 we planned 3 days of Granix with each cycle.   Patient also intolerant of oral iron.  Patient received 2 doses of IV injectafer on 02/05/2020 and 02/12/2020.   02/12/2020 Proceed with cycle #2 of FOLFOX 6 with 3 days of Granix.  On 3/11/2020 cycle 4 postponed secondary to abdominal pain and occasional low-grade fevers.  CT scans ordered.  Cycle #4 resumed after CT scan revealed no evidence of disease.  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 Ye. patient has no fever.  Continue to observe.   Grade 3 hand-foot syndrome secondary to 5-FU after cycle #7  FOLFOX 6 chemotherapy, prompting ER visit.  Also has worsening peripheral neuropathy.   Cycle #8 FOLFOX 6 was given on 5/13/2020, with 50% dose reduction for both 5-FU and oxaliplatin, and also examination of bolus 5-FU.   PET scan examination on 5/21/2020 reported no evidence of metastatic disease in the chest abdomen pelvis.  Stable 6 mm RUL pulmonary nodule.  On 5/27/2020, I discussed with the patient that we can discontinue chemotherapy at this time.   Patient had a surgical procedure for low anterior colon resection, coloanal anastomosis on 8/3/2020.  CT scan for chest abdomen pelvis on 9/9/2020 reported a new 8 mm noncalcified pulmonary nodule in the left lower lobe of the lung.  Otherwise stable right upper lobe 6 mm pulmonary nodule, and no evidence of disease recurrence in the abdomen or pelvis.  PET scan examination on 9/18/2020 reported multiple hypermetabolic small pulmonary nodules/ new pulmonary nodules.   Patient was started on pelvic chemotherapy with FOLFIRI regimen on 10/13/2020.   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 It reported tumor mutation burden 5 Muts/Mb, microsatellite stable, TP53 mutation R282W, and others.   Cycle #5 was without irinotecan, due to peripheral neuropathy.  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  Patient was switched to weight-based Lovenox injection.  Cycle #6 5-FU and irinotecan was delayed by 2 weeks because of the above incident.  Patient had a telemedicine evaluation at that the [facility] [place] in February 2021.  They agreed with our treatment plan for palliative chemotherapy followed by maintenance chemotherapy.    PET scan examination on 4/6/2021 after cycle #12 palliative chemotherapy reported no evidence of hypermetabolic metastatic lesion.   Patient was started on maintenance chemotherapy with 5-FU and  Avastin on 4/13/2021. (Unable to tolerate Xeloda because of a significant hand-foot syndrome).   PET scan on 9/24/2021 obtained after cycle #12 maintenance chemotherapy with 5-FU, leucovorin, Avastin reported no evidence for active disease. We recommended 3 months chemotherapy holiday.  CT scan examination on 3/15/2022 reported slightly disease progression with increase in size of small pulmonary nodule.  We started patient back on maintenance chemotherapy on 3/29/2022 treatment with 5-FU plus leucovorin and Avastin, repeating every 2 weeks.  After 6 more maintenance chemotherapy, CT scan for chest abdomen pelvis was done on 6/21/2022 which reported stable sub-6 mm pulmonary nodules.  Patient had last maintenance chemotherapy on 6/7/2022.   Disease progression, patient was restarted back on chemotherapy with 5-FU leucovorin and bevacizumab 8/21/2024      HISTORY OF PRESENT ILLNESS:  The patient is a 44 y.o. female with the above-mentioned history, who is seen today for triage visit.      History of Present Illness  The patient presented today on 11/13/2024 after a PET scan examination yesterday, which was obtained due to significant pain in the chest and right rib cage.    She continues to experience pain, which has improved slightly but remains severe. The pain is so intense that she is unable to lie on her side. She reports no recent injury. She underwent targeted radiation in 12/2023 and was informed that she might experience a broken rib or two within the next 1.5 to 2 years.    Two weeks ago, she visited the ER due to pain below her breast on the right side.  Patient had a negative chest x-ray examination.  She was reassured that everything appeared normal and was sent home. However, the pain persisted and even intensified, leading to another ER visit on 11/2/2024. A chest x-ray and chest angiogram study were performed, both of which showed no abnormalities.  There was no pulmonary emboli.  Despite this, the  pain has become so severe that she is unable to lie on her side, cough, sneeze, or blow her nose without experiencing discomfort.    She has been increasing her 5-FU dosage with each chemotherapy session and has tolerated it well. She is considering whether to continue increasing the 5-FU dosage or to reintroduce Avastin into her treatment regimen.    Results  Laboratory Studies on 11/13/2024  Hemoglobin 13.8, .4, platelets 170,000, WBC 6550, neutrophils 4760, glucose 130.    Imaging  PET scan on 11/12/2024 shows a new 2.8 x 1.3 cm groundglass opacity in the lateral aspect of the left apex with SUV 6.3. Remaining pulmonary nodules are smaller than what the previous PET scan reported and also no longer hypermetabolic. A 6 mm left lower lobe pulmonary nodule has SUV 0.8, from previous 0.8 cm with the maximum SUV 2.4. New hypermetabolic activity at a new healing fracture at the anterior aspect of the right second rib with the SUV 5.7. No suspicious visceral activity within the abdomen or pelvis, nor hypermetabolic lymphadenopathy. No suspicious activity in the neck.        Past Medical History:   Diagnosis Date    Abdominopelvic abscess 08/12/2020    ADMITTED TO [facility]    Abnormal Pap smear of cervix 02/02/1998    JULIUS I    Abscess of abdominal wall 11/28/2012    SEEN AT [facility] ER    Anemia in pregnancy     Anxiety     Bilateral epiphora 04/2020    Bilateral hand swelling 05/02/2020    SEEN AT [facility] ER    Cervical lymphadenitis 06/06/2012    SEEN AT [facility] ER    Chemotherapy induced diarrhea 01/2021    Chemotherapy induced neutropenia 04/2020    Chemotherapy-induced nausea 02/2020    Chemotherapy-induced thrombocytopenia 05/2020    Chronic anticoagulation     Chronic diarrhea     Colon polyps     FOLLOWED BY DR. 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 ALEXANDRU HUNT    History of gestational diabetes     History of pre-eclampsia     History of pre-eclampsia     History of radiation therapy     FOLLOWED BY DR. JAVON LEWIS    History of TB skin testing 2009    TB Skin Test    History of TB skin testing 2009    TB Skin Test    History of transfusion 2019    2019    HPV in female     Hypokalemia 2019    Hypomagnesemia 2019    Hyponatremia 2021    IBS (irritable bowel syndrome)     Ileostomy present 2020    Take down on 11/3/2022    Infected insect bite of neck 2016    SEEN AT [facility]    Kidney stones 2007    SEEN AT [facility] ER    Low anterior resection syndrome 2022    FOLLOWED BY DR. GERONIMO ESPARZA    Lump of right breast     SWOLLEN LYMPH NODE    Lung cancer 2020    METASTATIC LUNG CANCER    Macrocytosis 2021    Monopolar depression     On anticoagulant therapy     Pain associated with surgical procedure 2020    Palmar-plantar erythrodysesthesia 2021    Perirectal abscess 2020    Pulmonary embolism without acute cor pulmonale 09/20/2019    X 3; ADMITTED TO [facility]    Pulmonary nodule, right 2020    Rectal cancer 2019    STAGE IIA, INVASIVE MODERATELY DIFFERENTIATED ADENOCARCINOMA, CHEMO AND XRT FINISHED 2019    Right shoulder pain 2020    SEEN AT [facility] ER    Right ureteral stone 10/01/2019    SEEN AT [facility] ER    SAB (spontaneous ) 1996     A2-1 INDUCED    Sciatica     Sepsis due to cellulitis 2002    LEFT GREAT TOE, ADMITTED TO 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  She recovered without problem.      Past Surgical History:   Procedure Laterality Date    ABDOMINAL SURGERY      CHOLECYSTECTOMY N/A 10/10/2003    LAPAROSCOPIC WITH CHOLANGIOGRAM, DR. JAMEY TALAVERA AT [facility]    COLON RESECTION N/A 12/02/2019    Procedure: laparoscopic low anterior colon resection with mobilization of splenic flexure and diverting loop ileostomy: ERAS;  Surgeon: Padmaja Esparza MD;  Location: [facility] OR;  Service: General    COLON RESECTION N/A 08/03/2020    Procedure: LOW ANTERIOR COLON RESECTION, COLOANAL ANASTOMOSIS, MOBILIZATION SPLENIC FLEXURE;  Surgeon: Padmaja Esparza MD;  Location: [facility];  Service: General;  Laterality: N/A;    COLONOSCOPY N/A 07/15/2020    PATENT ANASTAMOSIS IN MID RECTUM, RESCOPE IN 1 YR, DR. PADMAJA ESPARZA AT [facility]    COLONOSCOPY N/A 11/03/2022    DIFFUSE AREA OF MODERATELY FRIABLE MUCOSA IN ENTIRE COLON , DIVERSION COLITIS, PATENT AND HEALTHY ANASTAMOSIS, DR. PADMAJA ESPARZA AT [facility]    COLONOSCOPY N/A 12/04/2023    6 MM SESSILE SERRATED ADENOMA POLYP IN DESCENDING, PATENT ANASTAMOSIS IN DISTAL RECTUM, RESCOPE IN 2 YRS, DR. PADMAJA ESPARZA AT [facility]    COLONOSCOPY W/ POLYPECTOMY N/A 07/08/2019    15 MM TUBULOVILLOUS ADENOMA POLYP IN HEPATIC FLEXURE, 20 MMTUBULOVILLOUS ADENOMA WITH HIGH GRADE DYSPLASIA IN RECTOSIGMOID, 4 CM MASS IN MID RECTUM, PATH: INVASIVE MODERATELY DIFFERENTIATED ADENOCARCINOMA, DR. JENNIFER LI AT [facility]    DILATATION AND EVACUATION N/A 2009    ENDOSCOPY N/A 07/08/2019    LA GRADE A ESOPHAGITIS, GASTRITIS, ALL BIOPSIES BENIGN, DR. JENNIFER LI AT [facility]    ILEOSTOMY CLOSURE N/A 11/14/2022    Procedure: ILEOSTOMY TAKEDOWN;  Surgeon: Padmaja Esparza MD;  Location: [facility];  Service: General;   Laterality: N/A;    INCISION AND DRAINAGE PERIRECTAL ABSCESS N/A 08/14/2020    Procedure: INCISION AND DRAINAGE OF retrorectal dehiscence abcess with drain placement and irrigation;  Surgeon: Padmaja Esparza MD;  Location: [facility] MAIN OR;  Service: General;  Laterality: N/A;    PAP SMEAR N/A 01/24/2014    SIGMOIDOSCOPY N/A 07/24/2019    INFILTRATIVE PARTIALLY OBSTRUCTING LARGE RECTAL CANCER, AREA TATOOED, DR. PADMAJA ESPARZA AT [facility]    SIGMOIDOSCOPY N/A 11/23/2019    AN ULCERATED PARTIALLY OBSTRUCTING MASS IN MID RECTUM, AREA TATTOOED, DR. PADMAJA ESPARZA AT [facility]    SIGMOIDOSCOPY N/A 07/22/2021    PATENT ANASTAMOSIS IN DISTAL RECTUM, RESCOPE IN 1 YR, DR. PADMAJA ESPARZA AT [facility]    SIGMOIDOSCOPY N/A 09/11/2024    PATCHY INFLAMMATION AND EDEMA IN DESCENDING, AREA BIOPSIED AND WAS BENIGN, PATENT AND HEALTHY ANASTAMOSIS, HEMORRHOIDS,RESCOPE(CY) 12/2025, DR. PADMAJA ESPARZA AT [facility]    TRANSRECTAL ULTRASOUND N/A 07/24/2019    Procedure: ULTRASOUND TRANSRECTAL;  Surgeon: Padmaja Esparza MD;  Location: [facility] ENDOSCOPY;  Service: General    URETEROSCOPY LASER LITHOTRIPSY WITH STENT INSERTION Right 10/30/2019    DR. ESTUARDO BEASLEY AT [place]    VAGINAL DELIVERY N/A 09/18/1998    VENOUS ACCESS DEVICE (PORT) INSERTION Left 08/09/2019    Procedure: INSERTION VENOUS ACCESS DEVICE;  Surgeon: Sj Joseph MD;  Location: [facility] OR [facility];  Service: General    VENOUS ACCESS DEVICE (PORT) INSERTION N/A 12/18/2020    Procedure: INSERTION VENOUS ACCESS DEVICE right side, removal venous access device left side;  Surgeon: Sj Joseph MD;  Location: [facility] OR [facility];  Service: General;  Laterality: N/A;    WISDOM TOOTH EXTRACTION Bilateral 1993       HEMATOLOGIC/ONCOLOGIC HISTORY:      The patient reports she has intermittent rectal bleeding and urgency, mucous with her stool, starting sometime in 2016. At that time she was referred to GI service, and was diagnosed of irritable bowel syndrome. Nevertheless she had worsening urgency for bowel  movement, and had a couple of incidents losing control of stool. She was recently seen by Roland Thorpe MD again and had colonoscopy and EGD exam on 07/08/2019. She was found having a circumferential rectal mass. According to the procedure note, the patient had a fungating circumferential bleeding 4 cm mass in the middle rectum, at distance between 13 cm and 17 cm from the anus. Mass was causing partial obstruction. There were also colon polyps found at the hepatic flexure and also at the rectosigmoid junction 23 cm from the anus. Both were resected and retrieved. EGD examination reported grade A esophagitis at the GE junction and patchy discontinuous erythema and aggravation of the mucosa without bleeding in the stomach body. There is normal mucosa of the duodenum. Pathology evaluation from this procedure reported moderately differentiated adenocarcinoma involving the rectal mass. The rectal sigmoid junction polyp was tubular/tubulovillous adenoma with high grade dysplasia negative for invasive malignancy. The hepatic flexure polyp was also tubular/tubulovillous adenoma negative for high grade dysplasia or malignancy. The biopsy from the esophagus reports squamocolumnar mucosa with inflammatory changes suggestive of mild reflux esophagitis, negative for interstitial metaplasia. Gastric biopsy was benign and duodenal biopsy was also benign. There is no mention of H-pylori.     Patient was subsequently referred to colorectal surgeon Padmaja Ye MD for further evaluation. The patient had CT scan examination for chest, abdomen and pelvis requested by Dr. Ye and were done on 07/13/2019. The study reported no evidence of lymphadenopathy in the abdomen and pelvis. There was wall thickening of the rectosigmoid junction. Normal spleen, pancreas, adrenal glands and kidneys. There was fatty liver infiltration but no focal lymphatic lesions. There was a small 6-7 mm noncalcified nodule in the right upper lobe and a tiny 3  mm subpleural nodule in the right middle lobe. No mediastinal or hilar lymphadenopathy.     Dr. Ye performed staging rectal ultrasound examination. This study reported tumor penetrating through the muscularis propria as T3 disease; there were no lymph nodes identified.    She had a hospitalization in late September 2019 because of newly discovered pulmonary emboli.  She was on prophylactic Lovenox prior to the incident of PE.  Now she is on full dose Xarelto anticoagulation.  Patient reports no further chest pain dyspnea.  She denies bleeding or bruising.  During her hospitalization, she was seen by Dr. Ye, who plans to have surgery 8 to 12 weeks after finishing radiation therapy.  She finished her radiation on 9/19/2019.     I noticed patient also presented to the emergency room on 10/1/2019 complaining of right flank area pain.  I reviewed the images studies and indeed she has a very small 1 to 2 mm obstructing kidney stone in the UV junction.  Patient is still symptomatic with some pains and dysuria.  She denies fever sweating or chills.    Laboratory study on 10/7/2019 reported normal CBC including hemoglobin 13.1, platelets 301,000, WBC 6170 and ANC 4900 lymphocytes 590 monocytes 480.      The nurse reported malfunction of port-a-catheter on 10/7/2019.  Port study on 10/8/2019 showed fibrin sheath around the distal aspect of the Mediport catheter in the SVC. This does not appear to hinder injection, but does prevent aspiration at this time.    Patient underwent surgical resection of the colon on 12/2/2019 with Dr. Ye.  Pathology evaluation reported residual T3 disease, also 1 out of 14 lymph nodes positive for malignancy.  So this patient in either had at least stage IIIb disease (T3 N1 M0?).      Adjuvant chemotherapy FOLFOX 6 will be started on 1/22/2020.  Laboratory study reported iron saturation 10%, free iron 31 TIBC 319 and ferritin 168.  Her hemoglobin was 11.8, WBC 5600, and platelets  347,000.    Patient was here on 02/12/2020 for cycle 2 of her FOLFOX.  This is delayed x1 week secondary to neutropenia.  The patient ultimately received 3 days worth of Neupogen with recovery of her blood counts.  Of note, the patient struggled with significant nausea without any episodes of vomiting following cycle #1 of chemotherapy resulting in significant weight loss.  She is up 12 pounds over the last week as her appetite has normalized.  We will add Emend to her care plan.    She is having several loose stools per her colostomy and has been hesitant to take Imodium due to prior history of constipation.  I encouraged her to try this with a maximum of 8 tablets/day.  She denies any infectious symptoms including fevers or chills.  No excess bleeding or bruising noted.  She had the expected cold sensitivity related to the Oxaliplatin for about 3 days following treatment.    Labs from 02/12/2020 demonstrated total white blood cell count of 5.14, ANC of 3.06, hemoglobin of 11.2, platelets of 211,000.  She was found to be iron deficient last week and is intolerant to oral iron secondary to GI distress.  For this reason, she initiated IV iron therapy with Injectafer last week.  She had received her second dose 02/12/2020    Patient has normalized hemoglobin 12.2 on 2/26/2020.    She reported on 5/5/2020 she had a recent visit to the emergency department for what was suspected to be an allergic reaction.  She called our on-call service on Saturday with reports of hand and face swelling.  She was instructed to proceed to the emergency department at which time she was assessed and prescribed a Medrol Dosepak.  She had just completed her 5-FU and was unhooked on Friday, 5/3/2020.  Her symptoms have improved.  She does report persistent hyperpigmentation and mild swelling of the palms of the hands but this is much improved.  It was her right hand specifically that was swollen.  Her facial swelling has resolved.  She  continues on Cefdinir nd since has 1 day remaining, she was prescribed cefdinir for a UTI requiring hospitalization at the end of April.  Reports no new symptoms.  Her labs are stable.  She is scheduled for treatment again.    Patient states at this time she is not tolerating her chemotherapy well.     Patient seen in the emergency department on 5/2/2020 for what was suspected to be an allergic reaction.  She called our on-call service on Saturday explaining that she was experiencing hand and face swelling.  She was instructed to proceed to the emergency department at which time she was assessed and prescribed a Medrol Dosepak.  She had just completed her 5-FU and was unhooked on Friday, 5/1/2020.      She reports since her ED visit on 5/2/2020 her symptoms have not improved. Her hands and feet were swollen upon presenting to the ED and she could barely make a fist. She states that she feels so swollen she is not able to stand it. She states that her skin on the hands and feet are peeling extensively as well, besides changing color to more dark.     She also reports significant fatigue after her first week of the 5-FU treatment but she expected this side effect. She also notices significant increase in her neuropathy to the point that she is not able to even walk around in her home without her house slippers due to irritation from her rugs. She denies and associated nausea or vomiting at this time. She also has episodes of epistaxis every day after the chemotherapy cycle #7.  She does report working full-time during the week of chemotherapy.    Laboratory studies, 5/13/2020, show moderate thrombocytopenia platelets 123,000, low normal WBC 4140 including ANC 2720 and normal hemoglobin 13.4.  Because significant hand-foot syndrome, will decrease both 5-FU and oxaliplatin by 50%, and eliminate bolus dose of 5-FU.    On 5/21/2020 patient had a PET scan performed which showed no convincing evidence of residual disease in  abdomen or pelvis, no metastatic disease within the chest or neck.     Cycle #8 FOLFOX 6 was given on 5/13/2020, with 50% dose reduction for both 5-FU and oxaliplatin, and also examination of bolus 5-FU.  She states on 5/27/2020 that with the recent reduction of the chemotherapy she feels significantly better. She has more energy and is able to do her daily routine.      PET scan examination on 5/21/2020 reported no evidence of metastatic disease in chest abdomen pelvis.  There was a stable 6 mm right upper lobe pulmonary nodule.    Laboratory studies on 5/27/2020 showed mild leukocytopenia WBC 3720 but a normal ANC 2250 and lymphocytes 630.  Normal platelets 163,000 and hemoglobin 12.6.  Chemistry lab reported normal renal function, liver function panel and a electrolytes, elevated glucose 164.    Laboratory studies 6/24/2020, showed normal hemoglobin 13.4 but macrocytosis .9.  Normal platelets 210,000 and WBC 5870.  She had normal CMP.     Patient last time was here in late June 2020.  Since that time she had surgical procedure for low anterior colon resection, coloanal anastomosis on 8/3/2020.  She later developed a perirectal abscess, required surgical drainage on 8/14/2020.  She was discharged on 8/27/2020.    Patient reports to me she still has lower abdominal wall vacuum suction in place.  She denies fever sweating chills.  Performance status is ECOG 1.  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  She does have visiting nurse come to the home for wound care.    CT scan for chest abdomen pelvis on 9/9/2020 reported a new 8 mm noncalcified pulmonary nodule in the left lower lobe.  Otherwise stable right upper lobe 6 mm pulmonary nodule, and no evidence of disease recurrence in the abdomen or pelvis.     Laboratory study on 9/16/2020 reported elevated AST 55, ALT 95, alk phosphatase 143 but normal total bilirubin 0.3.  Chemistry lab otherwise unremarkable.  She has completed  normal CBC.      Because of the abnormal CT scan examination for chest abdomen pelvis on 9/9/2020 reported a new 8 mm noncalcified pulmonary nodule in the left lower lobe, we obtained a PET scan examination for further evaluation, which was done on 9/18/2020.  This study reported several pulmonary nodules, some of them are hypermetabolic, newly developed compared to previous PET scan in May 2020.  Certainly does highly suspicious for metastatic disease.  There are also hypermetabolic activity in the abdomen and pelvic area which is hard to differentiate from surgical scar versus metastatic malignancy.    Laboratory study on 10/13/2020 reported normal CBC with Hb 13.9, platelets 302,000 and WBC 5520 including ANC 3830.  Chemistry lab reported normalized AST 20, alk phosphatase 116 improved ALT 35, and maintains normal bilirubin, with normal electrolytes and liver function panel.     Patient was started on first cycle of palliative chemotherapy FOLFIRI on 10/13/2020.    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  Patient had a new port placed 12/18/2020, and had held her Xarelto for 2 days prior to surgery.  She presented to the ER on 12/21/20 with complaints of facial and arm swelling for 3 days.  She also noted increased shortness of breath.  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  Thrombus within the right internal jugular vein cannot be excluded.  Patient was started on IV heparin, and eventually transitioned to weight-based Lovenox, which she now continues.    PET scan examination on 9/24/2021 reported further shrinking of the tiny right upper lobe pulmonary nodule.  Otherwise no new lesions.  No evidence for metastatic disease in other areas.      Patient had CT scan for chest with IV contrast obtained on 12/14/2021  which reported small tiny stable pulmonary nodules. There is a 4 mm right upper lobe pulmonary nodule. There is also a stable 4 mm left lower lobe pulmonary nodule. There is also stable left upper lobe micronodule.     Laboratory studies today on 12/21/2021 reported normal hemoglobin 15.9 however .0, platelets 218,000 WBC 5360 including neutrophils 3730 lymphocytes 980. Chemistry lab reported normal renal function, electrolytes, glucose, and marginally elevated ALT 55, AST 34 but normal total bilirubin and alk phosphatase.     CT scan for chest abdomen pelvis 6/21/2022 reported sub-6 mm pulmonary nodules either stable or slightly smaller.  No new pulmonary nodules or evidence for disease progression.  There is no evidence for metastatic disease in the liver however it shows diffuse hepatic steatosis.  There was masslike thickening in the presacral space with the clips or calcification approximately 1.7 cm in greatest AP dimension but stable.    Laboratory study on 9/20/2022 reported normal hemoglobin 15.7 with mild macrocytosis .1.  She has normal CBC and platelets.  She also has unremarkable CMP.  Normal CEA 1.13 ng/mL.    Patient was seen by Dr. Ye, who performed ileostomy takedown on 11/14/2022.  Patient reports that she is recovering.  She still has a small open wound less than 1 cm in diameter, however close to 2 cm deep.  She has been changing dressing herself.  She denies fever sweating chills.    Patient has no other specific complaints.  She has excellent performance status ECOG 0.  She denies chest pain dyspnea cough hemoptysis.  No abdominal pain.  No melena hematochezia.  Patient has been eating well, stable weight.  She works full-time.    She continues Lovenox injections and denies any significant bleeding issues.   No other new problems or concerns.     CT scan for chest abdomen pelvis obtained on 1/10/2023 reported stable small pulmonary nodules, no evidence for active or new  disease.    Laboratory study on 1/10/2023 reported normal CBC and normal CMP.  CEA level is 0.99 ng/mL.    CT scan for chest, abdomen, and pelvis on 7/7/2023 reported stable small pulmonary nodules including a left upper lobe 5 mm nodule. There were no new masses, or pleural effusion. No enlarged supraclavicular, axillary, mediastinal, or hilar lymphadenopathy. Mediastinal vasculature normal. There is occlusion of the superior vena around the catheter with body wall  is present. There was no evidence for disease recurrence in the abdomen or pelvis. Bone is also negative for metastatic disease.    Laboratory studies obtained on 07/07/2023 also reported normal CBC, and normal CMP as well as low level CEA 1.07 ng/mL.    The CT scan for the chest on 10/27/2023 reported enlarging pulmonary nodules bilaterally. The right upper lobe lung nodule increased from 7 x 7 mm to 9 x 8 mm, and the left upper lobe nodule measured 8 x 9 mm from 5 x 6 mm in 04/2023. There is a different left upper lobe nodule 6 x 8 mm from previous 5 x 5 mm. The presacral tissue thickness measures up to 2.3 cm.    A laboratory study on 10/27/2023 reported CEA 1.58 ng/mL, normal CBC, and CMP.  Molecular study of peripheral blood Guardant Reveal is still pending.    The patient presents today on 11/17/2023 for reevaluation to discuss the results of PET scan examination as well as the results of tumor conference. I saw her recently on 11/03/2023 and because of enlarging pulmonary nodules on the CT scan, we requested a PET scan examination. I presented her case yesterday on 11/16/2023 at the [facility].    The patient reports she is at her baseline condition as 2 weeks ago. No specific complaints, no chest pain, dyspnea, cough, etc. She has a regular bowel movement.    Her case was present at the [facility]. on 11/16/2023. The PET scan images and chest CT scan were reviewed in conjunction with her treatment  history. The consensus was for patient to receive stereotactic radiation therapy for the right upper lobe lung lesion, and the bigger left upper lobe lesion, and monitor the smaller left upper lobe lesion with further images studies down the line. Also, the conference recommended Guardant Reveal study which we already ordered.     This Guardant Reveal study came back today as negative for ctDNA 0%.        CT of the chest on 2/2/2024 reported shrinking of the right upper lobe lesion from 9 mm to 6 mm, and the left upper lobe lesion also decreased from 9 mm to 6 mm. Otherwise, there are a couple of stable pulmonary nodules, 7 to 8 mm. The right hilar has a small lymph node that has increased from 6 mm to 8 mm and is otherwise stable. There was no suspicious lesion in the abdomen or pelvis.    Colonoscopy examination 9/11/2024 showed patchy mild inflammation characterized by congestion/edema in the descending colon. Evidence of previous endo coloanal anastomosis in the rectum. Presence of hemorrhoids.      MEDICATIONS    Current Outpatient Medications:     bismuth subsalicylate (PEPTO BISMOL) 262 MG/15ML suspension, Take 15 mL by mouth 4 (Four) Times a Day., Disp: , Rfl:     clonazePAM (KlonoPIN) 1 MG tablet, Take 1 tablet as needed daily for anxiety. May take one additional as needed for severe anxiety., Disp: 45 tablet, Rfl: 3    Cyanocobalamin (VITAMIN B-12 PO), Take 1 tablet by mouth 3 (Three) Times a Week. M-W-F, Disp: , Rfl:     dicyclomine (BENTYL) 20 MG tablet, TAKE 1 TABLET BY MOUTH EVERY 6 (SIX) HOURS AS NEEDED FOR IRRITABLE BOWEL SYMPTOMS (Patient taking differently: Take 1 tablet by mouth Every Evening.), Disp: 360 tablet, Rfl: 2    diphenoxylate-atropine (LOMOTIL) 2.5-0.025 MG per tablet, TAKE 2 TABLETS BY MOUTH 4 TIMES A DAY, Disp: 240 tablet, Rfl: 11    Enoxaparin Sodium (LOVENOX) 100 MG/ML solution prefilled syringe syringe, INJECT 1 ML UNDER THE SKIN INTO THE APPROPRIATE AREA AS DIRECTED EVERY 12  (TWELVE) HOURS., Disp: 60 mL, Rfl: 2    escitalopram (LEXAPRO) 10 MG tablet, Take 1 tablet by mouth Daily. (Patient taking differently: Take 1 tablet by mouth Every Evening.), Disp: 90 tablet, Rfl: 1    famotidine (PEPCID) 20 MG tablet, TAKE 1 TABLET BY MOUTH TWICE A DAY (Patient taking differently: Take 1 tablet by mouth Every Evening.), Disp: 180 tablet, Rfl: 2    HYDROcodone-acetaminophen (NORCO) 5-325 MG per tablet, Take 1 tablet by mouth Every 6 (Six) Hours As Needed for Moderate Pain., Disp: 45 tablet, Rfl: 0    hydrocortisone 2.5 % cream, APPLY RECTALLY 3 TIMES DAILY. INCLUDE APPLICATOR., Disp: , Rfl:     Hydrocortisone, Perianal, (Anusol-HC) 2.5 % rectal cream, Apply rectally 3 times daily.  Include applicator., Disp: 30 g, Rfl: 1    Loperamide HCl (IMODIUM PO), Take  by mouth As Needed., Disp: , Rfl:     Loratadine 10 MG capsule, Take 1 capsule by mouth Every Evening., Disp: , Rfl:     metoprolol succinate XL (TOPROL-XL) 25 MG 24 hr tablet, TAKE 0.5 TABLETS BY MOUTH EVERY NIGHT, Disp: 45 tablet, Rfl: 2    nystatin (MYCOSTATIN) 739429 UNIT/GM cream, Apply 1 Application topically to the appropriate area as directed 2 (Two) Times a Day., Disp: 30 g, Rfl: 2    nystatin-hydrocortisone-bacitracin in zinc oxide ointment, Apply  topically to the appropriate area as directed Daily., Disp: 60 g, Rfl: 2    octreotide (sandoSTATIN) 100 MCG/ML injection, Inject 2 mL under the skin into the appropriate area as directed Every 8 (Eight) Hours., Disp: 168 mL, Rfl: 1    ondansetron (ZOFRAN) 8 MG tablet, TAKE 1 TABLET BY MOUTH THREE TIMES A DAY AS NEEDED FOR NAUSEA AND VOMITING, Disp: 60 tablet, Rfl: 1    pantoprazole (Protonix) 40 MG EC tablet, Take 1 tablet by mouth Daily., Disp: 90 tablet, Rfl: 1    rosuvastatin (CRESTOR) 5 MG tablet, TAKE 1 TABLET BY MOUTH EVERY DAY, Disp: 90 tablet, Rfl: 0  No current facility-administered medications for this visit.    ALLERGIES:   No Known Allergies    SOCIAL HISTORY:       Social  "History     Tobacco Use    Smoking status: Every Day     Current packs/day: 1.00     Average packs/day: 1 pack/day for 25.0 years (25.0 ttl pk-yrs)     Types: Cigarettes     Passive exposure: Current    Smokeless tobacco: Never    Tobacco comments:     1 PPD/caffeine use    Vaping Use    Vaping status: Some Days    Substances: Nicotine    Devices: Disposable    Passive vaping exposure: Yes   Substance Use Topics    Alcohol use: Not Currently     Comment: RARELY    Drug use: Never         FAMILY HISTORY:   Mother has positive factor V Leiden mutation, although she did not have thrombosis, mother also is coronary disease with stenting, she also is occasional bruising.    Maternal grandmother had DVT, she had multiple surgical procedures.    Patient mother's half-brother had metastatic colon cancer at diagnosis in his 50s.    Maternal great aunt had breast cancer.           Vitals:    11/13/24 0919   BP: 90/66   Pulse: 87   SpO2: 97%   Weight: 86.7 kg (191 lb 3.2 oz)   Height: 167.6 cm (65.98\")   PainSc:   6   PainLoc: Arm               11/13/2024     9:21 AM   Current Status   ECOG score 0     Physical Exam  Vitals reviewed.   Constitutional:       Appearance: Normal appearance. She is well-developed.   HENT:      Head: Normocephalic and atraumatic.      Comments:         Nose: Nose normal.   Eyes:      Conjunctiva/sclera: Conjunctivae normal.   Cardiovascular:      Rate and Rhythm: Normal rate and regular rhythm.   Pulmonary:      Effort: Pulmonary effort is normal.      Breath sounds: Normal breath sounds.   Abdominal:      General: Bowel sounds are normal.      Palpations: Abdomen is soft. There is no mass.      Tenderness: There is no abdominal tenderness.   Musculoskeletal:      Right lower leg: No edema.      Left lower leg: No edema.   Skin:     Findings: No bruising or rash.   Neurological:      Mental Status: She is alert. Mental status is at baseline.   Psychiatric:         Mood and Affect: Mood normal. " Affect is not tearful.       RECENT LABS:  Results from last 7 days   Lab Units 11/13/24  0909 11/08/24  1326   WBC 10*3/mm3 6.55 7.52   NEUTROS ABS 10*3/mm3 4.76 4.83   HEMOGLOBIN g/dL 13.8 13.6   HEMATOCRIT % 43.6 40.2   PLATELETS 10*3/mm3 170 216       Results from last 7 days   Lab Units 11/13/24  0909   SODIUM mmol/L 145   POTASSIUM mmol/L 4.1   CHLORIDE mmol/L 107   CO2 mmol/L 26.1   BUN mg/dL 6   CREATININE mg/dL 0.61   CALCIUM mg/dL 9.0   ALBUMIN g/dL 3.7   BILIRUBIN mg/dL 0.2   ALK PHOS U/L 83   ALT (SGPT) U/L 9   AST (SGOT) U/L 11   GLUCOSE mg/dL 130*             IMAGING:  NM PET/CT Skull Base to Mid Thigh (08/14/2024 09:17)     NM PET/CT Skull Base to Mid Thigh  F-18 FDG PET SKULL BASE TO MID THIGH WITH PET CT FUSION.     HISTORY: 44-year-old female with metastatic rectal cancer. Restaging.     TECHNIQUE: Radiation dose reduction techniques were utilized, including  automated exposure control and exposure modulation based on body size.   Blood glucose level at time of injection was 72 mg/dL. 5.8 mCi of F-18  FDG were injected and PET was performed from skull base to mid thigh. CT  was obtained for localization and attenuation correction. Time at  injection 1:38 p.m. PET start time 2:53 p.m. Normalization method:  patient weight. Compared with chest CTA 11/02/2024 and PET/CT  08/14/2024.     FINDINGS: Blood pool has a 2.4 max SUV, previously 2.5.  1. There is a new 2.8 x 1.3 cm ground-glass opacity at the lateral  aspect of the left apex with a 6.3 max SUV. The appearance is  nonspecific and this may represent an infectious or inflammatory  infiltrate. Reevaluation is recommended with a chest CT in 3 months.     Otherwise, the remaining pulmonary nodules are smaller than on the  previous PET/CT and are no longer hypermetabolic. A 6 mm pulmonary  nodule at the superior segment of the left lower lobe has a 0.8 max SUV,  previously 0.8 cm with 2.4 max SUV.     2. There is new hypermetabolic activity at a new  healing fracture at the  anterior aspect of the right 2nd rib with a 5.7 max SUV.     3. There is no suspicious visceral activity within the abdomen or pelvis  and there is no hypermetabolic lymphadenopathy.     4. There is no suspicious hypermetabolic activity at the neck.               Assessment & Plan      ASSESSMENT:   1.  Metastatic rectal cancer.   Initial rectal ultrasound examination reported T3N0 disease without lymphadenopathy.   CT scan of chest, abdomen and pelvis reported no lymphadenopathy in the abdomen, pelvis or chest. She does have small pulmonary nodule 6-7 mm and 2 mm with indeterminate feature. There is no solid evidence of metastatic disease otherwise.   Based on the CT scan and rectal ultrasound imaging studies, this patient had stage IIA (T3N0M0) disease.   She had PET scan examination on 8/8/2019 which reported a hypermetabolic right upper lobe pulmonary nodule 6 mm with SUV 5.6.  This is suspicious for metastatic disease however it is too small to be biopsied.  This patient may have stage IV disease.   She initiated concurrent radiation with continuous 5-FU on 8/12/2019.  Patient finished concurrent chemoradiation therapy.  Patient underwent surgical resection of the rectal tumor and diverting loop ileostomy on 12/2/2019 with Dr. Ye.  Pathology evaluation reported residual T3 disease, with 1 out of 14 lymph nodes positive for malignancy.  Certainly this patient has at least stage IIIb rectal cancer (T3N1M0?)  On 1/22/2020 adjuvant chemotherapy FOLFOX 6 regimen initiated.   On 2/5/2022 cycle #2 was delayed secondary to neutropenia.  She was given 3 days of Granix.   Emend added with cycle 3.  With improved nausea control  Continuing home Granix x3 days following 5-FU disconnect  3/11/2020 due for cycle 4, however, she is experiencing progressive abdominal pain and occasional fevers.   CT scan performed on 3/13/2020 reveals no evidence of progressive or recurrent disease.  It does reveal  possible vaginal fistula.  Patient hospitalized 4/24-4/26/20 after cycle 5 of chemotherapy with acute UTI.  CT abdomen/pelvis noting fluid collection in the presacral region having diminished in size compared to CT dated 3/13/2020.  Patient was evaluated by Dr. Ye while in the hospital with further plans to evaluate possible colovaginal fistula following completion of chemotherapy.  Patient did respond to IV antibiotics and discharged home on oral cefdinir.  4/29/2020 cycle 6 of FOLFOX.  Urinary symptoms have resolved   Patient seen in the [facility] ED on 5/2/2020 for what was suspected to be an allergic reaction.  She called our service on Saturday explaining that she was experiencing hand and face swelling.  She was instructed to proceed to the emergency department at which time she was assessed and prescribed a Medrol Dosepak.  She had just completed her 5-FU and was unhooked on Friday, 5/1/2020.  Her symptoms have resolved in the office on assessment, 5/5/2020.  The patient had grade 3 hand-foot syndrome based on symptomology.  Patient had cycle #8 of 5-FU on 5/13/2020. Due to her symptoms and poor tolerance to the 5-FU, her treatment dose will be reduced 50% for oxaliplatin and infusional 5-FU.  Bolus 5-FU will be discontinued..  On 5/21/2020 patient had a PET scan and it showed no evidence of of metastatic disease in the neck, chest, abdomen or pelvis.  There was fluid accumulation/abscess.   On 5/27/2020 discussed with the patient that we can discontinue chemotherapy at this time.  She will follow-up with Dr. Ye to discuss a possibility to reverse the ileostomy.  We likely will obtain anther PET scan in 3 to 4 months for reassessment.    Patient was seen by Dr. Ye on 6/19/2019 for evaluation and to discuss possible take down of her ileostomy.  She is scheduled to have a gastrografin enema on 7/2/2020 to evaluate for a fistula, and then a colonoscopy on 7/15/2020, both done by Dr. Ye.  She  states that based on the above imaging and procedure findings, she may have a more extensive surgery done or just have her ileostomy reversed, which would likely be done in August 2020.  This will all be coordinated under the care of Dr. Ye.     CT scan from 09/09/2020 and also compared to her previous PET scan examination from 05/21/2020 and also the original PET scan from 08/09/2019.  The original PET scan there is a small right upper lobe pulmonary nodule 6 mm with SUV 5.6. So in the 05/2020 PET scan the nodule is still there but activity seems much less and this most recent CT scan examination also documented the preexisting small pulmonary nodule however there is a new left lower lobe pulmonary nodule 8 mm in size and I shared with the patient today this nodule is suspicious for metastatic disease. Laboratory studies reported minimal CEA level 1.32 on 09/09/2020. Liver function panel was only marginally abnormal with the ALT 45, the remaining is normal. However laboratory study today showed worsening AST 55, ALT 95 and alkaline phosphatase 143 but is still normal, total bilirubin 0.3.   Recommended to have repeat PET scan examination for assessment because the left lower lobe pulmonary nodule is too small to be biopsied, and if the PET scan reports hypermetabolic lesion, I recommended to have stereotactic radiation therapy. Explained to patient that she is not a really good candidate to have thoracotomy for resection because she still has unhealed wound in the abdomen. Stereotactic radiation therapy will likely be a more feasible choice.   PET scan examination obtained on 9/18/2020 showed metastatic disease.  It reported a few hypermetabolic pulmonary nodules, and some new small pulmonary nodules, all of them highly suspicious for metastatic disease.  She also has hypermetabolic activity in the abdomen and pelvic area which could be related to scar tissue from her recent surgery versus metastatic disease.   Nevertheless overall picture fits with metastatic cancer.  Discussed with the patient on 9/20/2020, we reviewed the PET scan images together, and I recommended to have systematic chemotherapy, I do not think stereotactic reading therapy to the hypermetabolic lesions in the lungs warranted at this time because even those will be treated with reading therapy, she will still need systematic chemotherapy.  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  I did discuss with the patient using anti-EGFR monoclonal antibody versus anti-VEGF monoclonal antibody.     Palliative chemotherapy cycle#1 FOLFIRI was started on 10/13/2020.  No bolus 5-FU given considering previous poor tolerance.    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  Microsatellite stable, mutation burden 5/Mb.   Discussed with the patient 10/27/2020, because of the K-s[place]ia mutation, she is not a candidate for anti-EGFR monoclonal antibody such as Erbitux or vectibix.  She could be a candidate for anti-VEGF monoclonal antibody, however because of active wound, she is not a candidate right now at this moment.  Patient seen in the ED for acute right neck pain on 11/16/2020.  CT angiogram as well as CT of the cervical spine performed with no acute findings but notably one of her pulmonary nodules, left upper lobe, somewhat decreased in size.  Patient given prednisone pack.  Further details below.  Cycle #6 chemotherapy will be resumed on 1/5/2021.  Started back on original irinotecan.  PET scan examination obtained on 1/12/2021 after cycle #6 chemotherapy reported improved pulmonary nodule hypermetabolic activity.  Confirmed these are metastatic lesions.  Patient is evaluated 1/19/2020, will continue cycle #7 FOLFIRI chemotherapy.  However Avastin will be on hold see next section.   Patient was reevaluated on 2/2/2021, will continue chemotherapy cycle #8 FOLFIRI.  Avastin / biosimilar will be added.     She talked to [facility] specialist in mid February 2021, and had recommended palliative chemotherapy without surgical intervention of metastatic lung lesions.   On 3/2/2021, patient will proceed with cycle #10 FOLFIRI plus bevacizumab.  After 12 cycles, plan to start maintenance treatment.  3/16/2021 cycle 11.  Plus bevacizumab.  3/30/2021 cycle 12 FOLFIRI plus bevacizumab.  Having issues with worsening nausea despite premeds with dexamethasone, Aloxi, Emend, and Zofran at home.  Patient is requesting a dose of Phenergan, which is helped her in the past.  We will give this to her with treatment today.  PET scan examination on 4/6/2021 reported no evidence of hypermetabolic metastatic lesion.  Discussed with the patient on 4/13/2021, we reviewed the PET scan results.  We recommended to switch to maintenance chemotherapy without irinotecan.  We will continue 5-FU and Avastin every 2 weeks.  We discussed possibility of switching to oral Xeloda treatment.  Discussed with the patient for side effects more so with hand-foot syndrome.  Patient is agreeable.   Xeloda 2000 mg twice daily 7 days on, 7 days off along with Avastin initiated 4/27/2021.  After only 5 doses of Xeloda significant hand-foot syndrome, Xeloda held. Patient requesting to be transitioned back to infusional 5-FU, as she felt that she tolerated infusional 5-FU without any problem.  The patient will receive 5-FU leucovorin and Avastin every 2 weeks.  Xeloda discontinued.  5/25/2021 continued cycle #4 maintenance 5-FU, leucovorin, Avastin tolerating this much better so far, we will continue this. Recommended to have 12 cycles of maintenance chemotherapy, then obtain PET scan for reevaluation.  We could discuss further treatment plan at that time.  9/14/2021 patient due for cycle 12 of additional maintenance 5-FU/leucovorin plus Avastin.  She continues to tolerate treatment well overall.  She is anxious in the office today with  her Mediport not getting blood at first and also with upcoming scans being heavy on her mind.  She was instructed to take one of her Klonopin pills while here to help calm her mind.  Heart rate did improve from 140s down to 112.  Proceed with treatment today as scheduled.  PET scan examination on 9/24/2021 reported further shrinking of the right upper lobe tiny pulmonary nodule.  No other new lesions.  Discussed with patient on 9/24/2021 about further shrinking of the right upper lobe pulmonary nodule and no evidence for other new lesions. We recommended to have chemo break for 3 months with repeated CT scan for reevaluation.  Recent CT scan for chest 12/14/2021 and abdomen CT from 11/29/2021 reported no evidence for active disease. The right upper lobe pulmonary nodule, left lower lobe pulmonary nodule minimal about 4 mm and stable. I discussed with patient today on 12/21/2021, recommended to give her another 3 months chemotherapy holiday and obtain CT scan afterwards for reevaluation. After discussion, patient is agreeable.   CT scan examination for chest abdomen and pelvis obtained on 3/15/2022 reported a slight increase of the left upper lobe pulmonary nodule 5 mm, from previous 3 mm.  The other pulmonary nodules were stable in size.  There is also small left upper lobe groundglass changes.   Dr. Nguyen reviewed images studies with the patient 3/23/2022, compared to multiple previous images including CT chest CT and PET scan, suspected disease progression.  Discussed with the patient, and I recommended to resume maintenance chemotherapy with 5-FU plus leucovorin plus Avastin repeating every 2 weeks, and obtaining CT scan for reassessment in 3 months.  Patient is agreeable.  Patient resumed maintenance chemotherapy on 3/29/2022 with 5-FU leucovorin and Avastin.   4/26/2022, proceed with cycle #27 maintenance 5-FU, leucovorin, and Avastin.  Patient continues to tolerate treatment well.    On 5/10/2022, we will  proceed ahead with cycle #28 maintenance chemotherapy.  Plan to have CT scan after cycle #30.  5/24/2022 cycle 29 maintenance chemotherapy.  Continue to tolerate well.  6/7/2022 cycle #30 maintenance chemotherapy, continuing to tolerate well.   CT scan for chest abdomen pelvis with IV contrast obtained on 6/21/2022 reported sub-6 mm pulmonary nodules either stable or slightly smaller.  We reviewed the images with the patient together.  We discussed with the patient and recommended to hold chemotherapy for now with repeating CT scan in 3 months for reassessment.  CT scan of the chest abdomen pelvis 9/13/2022 reported stable condition, no evidence for recurrent or metastatic colon cancer.  On 1/10/2023 patient had a CT chest abdomen pelvis with reported no evidence for disease progression.  Laboratory study also showed normal CEA.   Patient had a CT scan for chest, abdomen, and pelvis obtained on 04/11/2023. This study reported very small pulmonary nodules, the right upper lobe nodule is stable to 6 mm, however, the left upper lobe and the left lower lobe nodule increased to 5 mm from previous 4 mm. I discussed with the patient today on 04/19/2023, I recommend to obtain another CT scan in 3 months for reassessment. The nodules are so small, they likely will not be picked up by PET scan examination.  CT scan examination of the chest, abdomen, and pelvis obtained on 7/7/2023 reported stable small pulmonary nodules. No evidence for disease progression or new lesions in chest, abdomen, and pelvis.  Discussed with patient today on 7/14/2023, however, patient is concerning for disease progression. I recommended to obtain Guardant Reveal for circulating tumor DNA study. If the study is positive, we will further obtain PET scan examination, otherwise, we will obtain CT scan for chest, abdomen, and pelvis in 3 months for reassessment.  The patient had CT scan for the chest, abdomen, and pelvis obtained on 10/27/2023, which  reported worsening pulmonary nodules at bilateral upper lobes. Laboratory studies showed low normal CEA level and normal liver function panel. The Guardant Reveal study is still pending. I discussed this with the patient on 11/03/2023 and we shared the images, and I recommended having a PET scan examination for further assessment. I will present her case at the multimodality lung conference. If those lesions are deemed to be metastatic lesions, I recommend having stereotactic radiotherapy. I discussed it with the patient, and she voiced understanding and was agreeable with that strategy.  I presented her case at the multimodality chest conference 11/16/2023.  The consensus was for patient to receive stereotactic radiation therapy for the right upper lobe lung lesion, and the bigger left upper lobe lesion, and monitor the smaller left upper lobe lesion with further images studies down the line. Also, the conference recommended Guardant Reveal study which we already ordered. I discussed with the patient today on 11/17/2023, we explained to the patient that the opinion of the conference and she is agreeable with this and prefers this strategy because of limited toxicity compared to long term commitment to starting chemotherapy again. I recommend to obtain a CT scan for the chest, abdomen, and pelvis with both IV and oral contrast for reassessment in 3 months for reassessment of metastatic lung lesions and thickness in the pelvis where the PET scan reported a low activity SUV 2.4 in the surgical bed.   The patient had steroid-induced radiation therapy for the right upper lobe and one of the left upper lobe lesions.  Her CT scan examination on 02/02/2024 reported shrinking nodules of the right upper lobe and one of the left upper lobe lesions, both from 9 mm down to 6 mm. There are 2 stable pulmonary nodules 7 mm. Nothing new.  02/09/2024, I discussed with the patient and recommended CT scan for the chest, abdomen, and  pelvis in 3 months for reassessment. Guardant Reveal study was 0% ctDNA. We will also continue laboratory studies every 3 months for Guardant Reveal, together with routine labs, CBC, CMP, and CEA.  CT scan of the chest, abdomen, and pelvis conducted on 4/23/2024 revealed stable multiple pulmonary nodules, with no other new pulmonary nodules or evidence of disease recurrence or abnormal pelvis. The patient's liver function panel was normal, and low-level CEA level was also noted. The Guardant Reveal study was able to be obtained earlier due to regulatory rules, and we hugo obtain today the Guardant reveal study, be available within 10 to 14 days.   Given the patient's metastatic liver lesion, and lung lesions from colon cancer, careful monitoring is necessary. A chest CT scan with IV contrast will be arranged in 3 months for reevaluation. The study will be reviewed again in 3 months, which will include CBC, CMP, and CEA level.   Imaging study with chest CT scan on 08/02/2024 reported multiple bilateral pulmonary nodules that are relatively stable. However, the Guardant Reveal reported positive ctDNA indicating disease progression. A subsequent PET scan examination on 08/14/2024 reported newly developed hypermetabolic pulmonary nodules. The highest SUV is 3.7, corresponding to an 8 mm new right upper lobe nodule, and there are several other hypometabolic nodules in the lungs.   8/21/2024 resumption of chemotherapy with 5-FU bevacizumab  Triage visit 8/28/2024 with intractable diarrhea that was unclear if this is secondary to chemotherapy or infectious etiology given her known chronic colitis, fever and abdominal pain.  Further management as outlined below  9/4/2024 per review today diarrhea has improved though she is having some difficulty with passage of gas and stool,?  Related to significant inflammation in the rectum versus tumor obstruction.  The passage of gas has improved in the last few days but she does still  have to change positions on the toilet to get stool to pass without it being painful.  Discussed all of this with Dr. Nguyen and we will refer the patient urgently back to Dr. Ye for at the very least rectal examination in the office versus full repeated colonoscopy.  Because of the potential for examination or invasive procedures, we will hold bevacizumab today.  Because the patient had significant diarrhea last week and concerns that it may be related to chemotherapy we will decrease her 5-FU/leucovorin today by 30%.  If she does well we can go back to full dose with cycle 3.     9/18/2024 she presented for evaluation prior to cycle #3 of palliative chemotherapy with 5-FU, leucovorin, and bevacizumab. Given her recent biopsy on 09/11/2024, it is prudent to postpone the Avastin treatment today to ensure safety.  Chemotherapy will be proceeded.      She presented for evaluation on 10/02/2024, tolerating chemotherapy except for diarrhea. This has been managed with octreotide. Proceed with cycle 4 chemotherapy today with 5-FU, leucovorin and resumption of bevacizumab.   Reevaluation 10/16/2024 due for cycle 5 5-FU, leucovorin, bevacizumab.  Due to ongoing significant diarrhea, bevacizumab will be placed on hold for potential surgical intervention.  Additionally, 5-FU will be dose reduced to 50%.  Additional adjustments as outlined below  Patient reviewed 10/30/2024 with improved tolerance to cycle five 5-FU, leucovorin.  We we will attempt to slightly escalate 5-FU dosing, increasing by 10% (40% dose reduction)  Patient did experience leakage of 5-FU, and return 10/31/2024 for evaluation of this.  She was sent for port dye study that showed correct location of her port, so 5-FU was resumed.  The patient's right rib pain is likely due to a new chair on the bone, as indicated by the PET scan. The PET scan revealed a new 2.8 x 1.3 cm groundglass opacity in the lateral aspect of the left apex with SUV 6.3. This  appearance is nonspecific and may represent an infectious or inflammatory infiltrate. Continued monitoring and follow-up imaging are recommended.  The current regimen of 5-FU will be maintained, and Avastin will be reintroduced with full dose. 11/13/2024 will proceed ahead with 5-FU, leucovorin and bevacizumab    2.  Intractable diarrhea due to chemotherapy.   Patient developed diarrhea on Saturday, 8/24/2024.  Is unclear if this is related to infection as she did have fevers at the time the diarrhea began or chemotherapy.  She does have chronic colitis noted on most recent PET scan, this therefore could be diverticulitis flare  GI panel and C. difficile negative  8/29/2024 she did receive a single dose of octreotide  Begin empiric Flagyl 500 mg 3 times daily x 10 days  8/30/2024 discussed negative stool studies.  We will add Levaquin to already prescribed Flagyl to complete management of diverticulitis.  It is unclear if the patient's symptoms are related to diverticular flare given her previous abdominal pain and fever versus chemotherapy.  However, her symptoms are currently improved  9/4/2024 diarrhea has resolved.  Stools are now more soft with some form but she is having difficulty with passage of stool, having to change positions on the toilet to avoid pain.  We are referring back to Dr. Ye as detailed above.  She will finish out the Flagyl and Levaquin as prescribed having roughly 3 days left of both.  We will also adjust doses of 5-FU/leucovorin today with concern for potential chemotherapy-induced diarrhea.  If she does well this cycle we can increase back to full dose with cycle 3 per Dr. Nguyen.  On 9/18/2024 patient reports that she experienced significant diarrhea during cycle #2 chemotherapy on 09/04/2024, leading to a 30% dose reduction. Despite taking Lomotil 2 tablets four times a day, Imodium, and Pepto-Bismol, her diarrhea persisted. She received octreotide shots twice, which improved her  symptoms from watery to mushy stools but did not fully resolve the issue. She will continue with the current regimen and consider adding octreotide to her home regimen if diarrhea starts at home. The potential side effects of octreotide, including nausea, were discussed.   10/1/2024 she reports using octreotide self-injection at home, which has significantly improved her diarrhea. Most of the time, she only requires it once a day and occasionally twice a day, depending on the severity of her diarrhea. She is satisfied with the results, which have improved her quality of life and ability to eat properly.   She will also use Lomotil and the Imodium as needed.  Patient is very tearful in the office 10/16/2024 regarding debilitating diarrhea and interference with quality of life.  She questions potential surgical intervention and placement of colostomy due to ongoing pain in her rectum and burning of her skin from diarrhea.  We discussed adjustments today including increased dose of octreotide to 200 mcg 3 times daily for diarrhea.  Chemotherapy dose adjustments.  Diarrhea was slightly improved following most recent cycle of chemotherapy.  Continue supportive care  Stable condition today on 11/13/2024.      3.   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  Low-dose Lovenox discontinued and initiated Xarelto 20 mg daily.  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  Patient held Xarelto for 2 days prior to MediPort removal from the  left chest wall and placement of new port in the right chest wall on 12/18/2020.  She resumed Xarelto that evening.  Progressive swelling in the neck, face, upper extremities prompting hospitalization with CT scan showing thrombosis in the right subclavian vein and SVC.  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  Although she had been off of Xarelto for a few days, clearly Xarelto was not prohibiting progression of her thrombosis after she resumed treatment and there was some evidence to suggest thrombosis had been present at least in the innominate vein on prior scan in September but now appears chronic.  Current acute thrombosis involving SVC and right subclavian.  Patient was admitted and placed on heparin drip  Patient was evaluated by vascular surgery and intervention was not recommended for thrombolysis/thrombectomy.  On 12/22/2020, the patient developed worsening shortness of breath, increasing upper extremity edema consistent with worsening SVC syndrome.  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  Symptoms improved and patient was discharged 12/24/2020 on Lovenox 100 mg every 12 hours.    Returns 12/29/2020 for evaluation continuing Lovenox, overall tolerating it well.  No bleeding issues.  Improved edema 1/5/2021.  Will continue Lovenox weight-based every 12 hours.  On 1/19/2021 and 2/2/2021, patient reports improved facial swelling.  She however does have being bruise on her abdomen wall where she does Lovenox injection.  However she does not have a spontaneous epistaxis, gum bleeding or other bleeding.   On 2/2/2021, we discussed that side effects of Avastin/biosimilar related to thrombosis.  Since this patient already has been on Lovenox, I think the benefits from treatment for her cancer will outweigh that  risk of thrombosis, will proceed ahead with Avastin.  I cautioned patient to watch out for signs of worsening thrombosis patient voiced understanding.   5/11/2021 Lovenox dosing will be adjusted due to patient's weight loss.  We discussed she needs 1 mg/kg.  Her syringes are 100 mg syringes she will do 0.9 mL subcu every 12 hours.  8/3/2021 Patient continues to have bruising on abdomen from lovenox injections.  Will need to monitor weight and adjust dosing in future if further weight loss.   Stable condition on 9/28/2021.  Continue Lovenox anticoagulation.    Patient reports no chest pain no dyspnea no leg edema. However she had bruising and also most recently abnormal small abscess for which she actually went to ER on 11/29/2021, had I&D. The wound is healing. No further drainage. Denies fever sweating or chills. After discussion, she will continue Lovenox injection for now.   On 3/23/2022, patient reports good tolerance of Lovenox.  Nevertheless she still has small quantity of pus in the left lower abdomen abscess, she squeezes it every day to prevent it became worse.  She reports no fever sweating chills.  Recommended to start Augmentin 875 mg twice a day for 7 days.  She will continue Lovenox indefinitely.  On 4/12/2022, patient reports resolution of the small abscess at left lower abdominal wall.   On 5/10/2022, patient continues on Lovenox indefinitely.  No easy bleeding or bruising.  6/23/2022 continuing on Lovenox 1 mg/kg at a dose of 100 mg (patient weight is 96.6 kg today) every 12 hours.  On 1/17/2023, patient reports good tolerance, Lovenox will be continued.    Patient had a venous Doppler study on 02/05/2023, which reported acute left upper extremity DVT in the subclavian vein, axillary and the brachial vein, and also superficial thrombophlebitis in the basilic upper arm.   On 04/19/2023, patient reports that she was not compliant with anticoagulation at the time of recurrent DVT. She now is fully  compliant and is using Lovenox.  Continues on anticoagulation without signs or symptoms of bleeding.  She does have occasional irritation and bleeding with wiping related to frequent diarrhea  Due to right sided pleuritic pain the patient was seen in the emergency department 10/22/2024 with a negative CT angiogram.  Without thrombosis.  Anticoagulation continued.  Patient had CTA again on 11/2/2024 due to continued pleuritic pain that was negative for PE.  11/13/2024 tolerating anticoagulation with Lovenox.      4.  Mild anemia.   She also has history of iron deficiency.  Iron studies reveal iron saturation of 10%.  She was started on oral iron but was unable to tolerate due to GI side effects.   Status post Injectafer 2/5/2020 and 2/12/2020   Improved hemoglobin 13.5 on 1/5/2021.  3/16/2020 when hemoglobin now up to 16.2, hematocrit 47.6.  Patient admits that she has started smoking again, and I have encouraged her to quit.  She denies any snoring or sleep apnea diagnosis.  Patient is not taking oral iron.  We will closely monitor.  3/30/2020 hemoglobin 15.7, HCT 47.5.  Patient states that she has cut back significantly on her smoking, although not quit yet.  I have encouraged her to continue working on this.  We will continue to closely monitor.  Hemoglobin 14.6 on 6/8/2021 at the meantime he has worsening macrocytosis .6.    Laboratory studies 6/22/2021 reported excellent folate > 20 ng/mL, however low normal B12 level 357 pg/mL.    On 7/6/2021, normal hemoglobin 15.8, .9 and HCT 47.2%.  Patient was asked to start oral vitamin B12 at 1000 mcg daily.   9/14/2021 hemoglobin 17.0, hematocrit 52.1.  Monitor.  On 9/28/2021 hemoglobin 16.1 hematocrit 48.5%, continue to monitor.   Lab study on 12/14/2021 reported excellent ferritin 296 and iron saturation 25% with free iron 104 TIBC 424. Hemoglobin was 15.2 with .2.   Normal hemoglobin 14.6 on 3/15/2022.  Iron study reported normal ferritin 129.5  ng/mL however slightly low iron saturation 11%.  Continue to monitor for now.  9/4/2024 hemoglobin is normal at 14.0  Hemoglobin today 13.8 on 11/13/2024.    5.  Peripheral neuropathy secondary to chemotherapy.   This is mild involving bilateral hands and feet as reported on 9/16/2020.   Will avoid chemotherapy with oxaliplatin.  Stable mild neuropathy as of 11/10/2020  Patient reports worsening peripheral neuropathy and cycle #5 chemotherapy on 12/8/2020 with and without irinotecan.   On 1/5/2021, patient reports improvement of peripheral neuropathy, will add irinotecan back to chemotherapy.  Continue to monitor and adjust medication.  Stable.      6.  Depression.  Patient seen by JULIET Davenport on 11/9/2020.  She was started on mirtazapine.  Lexapro was discontinued.  This has definitely improved her mood with the patient feeling overall much better.  She is sleeping better.  Appetite is improved with her actually eating more than she wants to.    Condition is stable.  She plans to continue follow-up with supportive oncology.  Patient is very tearful in the office today regarding need for dose adjustment to chemotherapy due to significant toxicity.    7. Malfunction of the portal catheter  Patient reports there was no blood drawn from the portal catheter. CT scan of the chest on 7/7/2023 reported occlusion of the SVC around to the Port-A-Cath tubing. I recommend trying activase to see if it helps.  Otherwise, we may have to remove the catheter. Clinically, patient has no signs of SVC syndrome.  On 11/03/2023, the patient reports that her Port-A-Cath was able to be used for a CT scan for the injection of intravenous dye; however, there was no blood return, so we needed to draw from her arm for the blood test. We will continue to keep Port-A-Cath for now.  On 02/09/2024, the patient reports that the port was able to be flushed. However, no blood was returned. We will continue to flush every 6 weeks.  9/4/2024  she continues to have no blood return from her port but can taste saline as we flush at each time and is functioning well otherwise.  Monitor.  10162/2024 no specific problem.  Port dye study 10/31/2024 showing correct position of port.  Fibrin sheath present.  No issue today on 11/13/2024.    8. Internal hemorrhoids.  She has large internal hemorrhoids diagnosed during a flexible sigmoidoscopy on 09/11/2024. Hydrocortisone cream was prescribed for treatment.     9.  This patient also has strong family history for malignancy   The patient had appointment with genetic counseling on September 3, 2019.  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    10. Hypertension.  Continue management of hypertension and follow up with PCP.    11.  Chest and back pain  Ports this has been ongoing for about 4 weeks.  Started in her right chest/rib cage.  Originally this pain had completely resolved, however returned when she received her next chemotherapy infusion. She felt like the pain worsened after she was unhooked from her 5-FU and that the pain spread to her right scapular/back area.  Since unhook she has continued with pain in her right chest/back that has required her to take pain medicine regularly which is not typical for her.  She has pain with deep inspiration and pain with certain movements.  She also has pain when she pushes on the tissue around her port, though there is no redness or warmth around her port site aside from red rash in the shape of a bandaid and she reports being sensitive to bandaids.  Reviewed all of patients symptoms with Dr. Nguyen who recommended obtaining doppler right upper extremity and PET scan for further evaluation of this severe right sided pain that has been evaluated by CTA without any signs of what could be causing her pain.  Of note she was seen in the ED 11/2/2024, had CT chest performed which was negative for PE.  EKG and troponin looked okay.  She was discharged home.    Healing  fracture of the right 2nd rib. The PET scan on 11/12/2024 showed new hypermetabolic activity at a healing fracture on the anterior aspect of the right second rib with SUV 5.7. The patient should avoid activities that may exacerbate the pain and consider pain management options as needed.      12. Elevated glucose.  Laboratory studies on 11/13/2024 reported a glucose level of 130. The patient should monitor her blood glucose levels and follow a balanced diet to manage her glucose levels.      PLAN:    Proceeded today with palliative chemotherapy 5-FU leucovorin and bevacizumab.  Patient return in 2 days to discontinue 5-FU infusion.  Continue octreotide, currently utilizing 100 mcg every 8 hours following chemotherapy  Continue Imodium and Lomotil as needed.  Continue Protonix 40 mg daily.   Continue Gas-X.   Continue Magic barrier cream rectum as needed   Continue anticoagulation with Lovenox subcu injection every 12 hours.   Because of the Thanksgiving holiday, next cycle chemotherapy 1 week delayed.  She will see me with laboratory studies prior to next chemotherapy with 5-FU leucovorin and bevacizumab in 3 weeks.  Pending tolerance to chemotherapy we can consider referral back to Dr. Ye for colostomy as the patient's diarrhea is quite debilitating and negatively impacting her quality of life. For this reason bevacizumab remains on hold until MD follow up    The patient is on a high risk medication requiring close monitoring for toxicity.  Patient reviewed with Dr. Nguyen who is in agreement with today's plan of care.    I spent 43 minutes caring for Carole on this date of service. This time includes time spent by me in the following activities: preparing for the visit, reviewing tests, obtaining and/or reviewing a separately obtained history, performing a medically appropriate examination and/or evaluation, counseling and educating the patient/family/caregiver, ordering medications, tests, or procedures,  referring and communicating with other health care professionals, documenting information in the medical record, independently interpreting results and communicating that information with the patient/family/caregiver and care coordination     I shared the PET scan images with the patient in today.      Dong Nguyen MD PhD  11/13/2024        CC:  Deepika Vela III NP-C   MD Alverto Bowers MD

## 2024-11-14 DIAGNOSIS — G89.3 CANCER ASSOCIATED PAIN: ICD-10-CM

## 2024-11-15 ENCOUNTER — TELEPHONE (OUTPATIENT)
Dept: ONCOLOGY | Facility: CLINIC | Age: 44
End: 2024-11-15

## 2024-11-15 ENCOUNTER — INFUSION (OUTPATIENT)
Dept: ONCOLOGY | Facility: HOSPITAL | Age: 44
End: 2024-11-15
Payer: COMMERCIAL

## 2024-11-15 DIAGNOSIS — C78.00 MALIGNANT NEOPLASM METASTATIC TO LUNG, UNSPECIFIED LATERALITY: ICD-10-CM

## 2024-11-15 DIAGNOSIS — Z79.899 ENCOUNTER FOR LONG-TERM (CURRENT) USE OF HIGH-RISK MEDICATION: Primary | ICD-10-CM

## 2024-11-15 DIAGNOSIS — C20 ADENOCARCINOMA OF RECTUM: ICD-10-CM

## 2024-11-15 DIAGNOSIS — Z45.2 ENCOUNTER FOR FITTING AND ADJUSTMENT OF VASCULAR CATHETER: ICD-10-CM

## 2024-11-15 PROCEDURE — 25010000002 HEPARIN LOCK FLUSH PER 10 UNITS: Performed by: INTERNAL MEDICINE

## 2024-11-15 RX ORDER — HEPARIN SODIUM (PORCINE) LOCK FLUSH IV SOLN 100 UNIT/ML 100 UNIT/ML
500 SOLUTION INTRAVENOUS AS NEEDED
Status: DISCONTINUED | OUTPATIENT
Start: 2024-11-15 | End: 2024-11-15 | Stop reason: HOSPADM

## 2024-11-15 RX ORDER — HEPARIN SODIUM (PORCINE) LOCK FLUSH IV SOLN 100 UNIT/ML 100 UNIT/ML
500 SOLUTION INTRAVENOUS AS NEEDED
OUTPATIENT
Start: 2024-11-15

## 2024-11-15 RX ORDER — SODIUM CHLORIDE 0.9 % (FLUSH) 0.9 %
10 SYRINGE (ML) INJECTION AS NEEDED
Status: DISCONTINUED | OUTPATIENT
Start: 2024-11-15 | End: 2024-11-15 | Stop reason: HOSPADM

## 2024-11-15 RX ORDER — HYDROCODONE BITARTRATE AND ACETAMINOPHEN 5; 325 MG/1; MG/1
1 TABLET ORAL EVERY 6 HOURS PRN
Qty: 45 TABLET | Refills: 0 | Status: SHIPPED | OUTPATIENT
Start: 2024-11-15

## 2024-11-15 RX ORDER — SODIUM CHLORIDE 0.9 % (FLUSH) 0.9 %
10 SYRINGE (ML) INJECTION AS NEEDED
OUTPATIENT
Start: 2024-11-15

## 2024-11-15 RX ADMIN — Medication 500 UNITS: at 10:57

## 2024-11-15 RX ADMIN — Medication 10 ML: at 10:57

## 2024-11-15 NOTE — TELEPHONE ENCOUNTER
Caller: [facility] - [place] [address] - [phone]  - [phone]    Relationship: Pharmacy    Best call back number: [phone]    Do you know the name of the person who called: LIAN     What was the call regarding: NEED TO SEND A SCRIPT OVER FOR THE SYRINGES AND NEEDLES FOR HER   OCTREOITIDE. FAX NUMBER IF NEEDED [phone].

## 2024-11-18 ENCOUNTER — TELEPHONE (OUTPATIENT)
Dept: ONCOLOGY | Facility: CLINIC | Age: 44
End: 2024-11-18

## 2024-11-18 NOTE — TELEPHONE ENCOUNTER
Pharmacy Name: [facility] - [address] - [phone]  - [phone]        Pharmacy representative name: PATRICIA    Pharmacy representative phone number: [phone]    What medication are you calling in regards to: OCTREOTIDE 1 ML    What question does the pharmacy have: NEW PRIOR AUTH NEEDED STARTING 11/15/24

## 2024-11-20 ENCOUNTER — SPECIALTY PHARMACY (OUTPATIENT)
Dept: PHARMACY | Facility: HOSPITAL | Age: 44
End: 2024-11-20
Payer: COMMERCIAL

## 2024-11-20 NOTE — PROGRESS NOTES
The following PATIENT CALLS message was forwarded to my attention:          I have submitted a PA request in the [employer] Portal and received the following response:          I will forward this information to Lauryn Marquez, our [facility] point of Contact.     Galina Pearce - Care Coordinator   11/20/2024  15:25 EST    ADDENDUM  The following PATIENT CALLS message was forwarded to my attention:      I have submitted another PA and I'm waiting on a response.     Galina Pearce - Care Coordinator   11/25/2024  13:49 EST

## 2024-11-20 NOTE — TELEPHONE ENCOUNTER
Pharmacy Name: [facility] - [place] - [phone] [facility] [phone]      Pharmacy representative name: MARY    Pharmacy representative phone number: [phone]    What medication are you calling in regards to: OCTREOTIDE - NEED A NEW P/A BECAUSE PATIENT WENT FROM 1 ML TO 2 ML    Who is the provider that prescribed the medication: DR. HUNT

## 2024-11-27 RX ORDER — OCTREOTIDE ACETATE 100 UG/ML
100 INJECTION, SOLUTION INTRAVENOUS; SUBCUTANEOUS 3 TIMES DAILY
Qty: 90 ML | Refills: 2 | Status: SHIPPED | OUTPATIENT
Start: 2024-11-27

## 2024-11-27 NOTE — PROGRESS NOTES
Specialty Pharmacy Patient Management Program  Per Protocol Prescription Order or Refill     Patient will be filling or currently fills medications at [facility] and is enrolled in the Patient Management Program.    Requested Prescriptions     Signed Prescriptions Disp Refills    octreotide (sandoSTATIN) 100 MCG/ML injection 90 mL 2     Sig: Inject 1 mL under the skin into the appropriate area as directed 3 (Three) Times a Day.     Prescription orders above were sent to the pharmacy per Collaborative Care Agreement Protocol.   Dose verified with Dr Nguyen as 100 mcg, not 200 mcg.      Last Office Visit: 11/13/24  Next Office Visit: 12/4/24    Almaz Camarillo Rph, BCOP  Clinical Specialty Pharmacist, Oncology  11/27/2024  16:33 EST

## 2024-11-29 DIAGNOSIS — G89.3 CANCER ASSOCIATED PAIN: ICD-10-CM

## 2024-11-29 RX ORDER — HYDROCODONE BITARTRATE AND ACETAMINOPHEN 5; 325 MG/1; MG/1
1 TABLET ORAL EVERY 6 HOURS PRN
Qty: 45 TABLET | Refills: 0 | Status: CANCELLED | OUTPATIENT
Start: 2024-11-29

## 2024-12-01 DIAGNOSIS — F33.9 MONOPOLAR DEPRESSION: ICD-10-CM

## 2024-12-02 DIAGNOSIS — G89.3 CANCER ASSOCIATED PAIN: ICD-10-CM

## 2024-12-02 DIAGNOSIS — F33.9 MONOPOLAR DEPRESSION: ICD-10-CM

## 2024-12-02 RX ORDER — ESCITALOPRAM OXALATE 10 MG/1
10 TABLET ORAL DAILY
Qty: 90 TABLET | Refills: 1 | OUTPATIENT
Start: 2024-12-02

## 2024-12-02 RX ORDER — ESCITALOPRAM OXALATE 10 MG/1
10 TABLET ORAL DAILY
Qty: 90 TABLET | Refills: 1 | Status: SHIPPED | OUTPATIENT
Start: 2024-12-02

## 2024-12-02 RX ORDER — HYDROCODONE BITARTRATE AND ACETAMINOPHEN 5; 325 MG/1; MG/1
1 TABLET ORAL EVERY 6 HOURS PRN
Qty: 60 TABLET | Refills: 0 | Status: SHIPPED | OUTPATIENT
Start: 2024-12-02

## 2024-12-02 NOTE — TELEPHONE ENCOUNTER
Rx Refill Note  Requested Prescriptions     Pending Prescriptions Disp Refills    escitalopram (LEXAPRO) 10 MG tablet [Pharmacy Med Name: ESCITALOPRAM 10 MG TABLET] 90 tablet 1     Sig: TAKE 1 TABLET BY MOUTH EVERY DAY      Last office visit with prescribing clinician: 6/26/2024  Next office visit with prescribing clinician: Visit date not found         Erika Thakur MA  12/02/24, 08:26 EST      Left voicemail for pt. Needs to schedule follow up before getting further refills.   Return in about 3 months (around 9/26/2024).

## 2024-12-02 NOTE — TELEPHONE ENCOUNTER
Rx Refill Note  Requested Prescriptions     Pending Prescriptions Disp Refills    escitalopram (LEXAPRO) 10 MG tablet 90 tablet 1     Sig: Take 1 tablet by mouth Daily.      Last office visit with prescribing clinician: 6/26/2024  Next office visit with prescribing clinician: 12/10/2024         Erika Thakur MA  12/02/24, 11:00 EST

## 2024-12-02 NOTE — PROGRESS NOTES
Specialty Pharmacy Patient Management Program  Per Protocol Prescription Order or Refill       Requested Prescriptions     Signed Prescriptions Disp Refills    octreotide (sandoSTATIN) 100 MCG/ML injection 90 mL 2     Sig: Inject 1 mL under the skin into the appropriate area as directed 3 (Three) Times a Day.     Prescription orders above were sent to [facility] per Collaborative Care Agreement Protocol.     Completed independent double check on medication order/RX.    Bonnie La, Shayna, BCPS  Clinical Specialty Pharmacist, Oncology  12/2/2024  08:08 EST

## 2024-12-03 ENCOUNTER — SPECIALTY PHARMACY (OUTPATIENT)
Dept: PHARMACY | Facility: HOSPITAL | Age: 44
End: 2024-12-03
Payer: COMMERCIAL

## 2024-12-03 ENCOUNTER — TELEPHONE (OUTPATIENT)
Dept: ONCOLOGY | Facility: CLINIC | Age: 44
End: 2024-12-03

## 2024-12-03 NOTE — TELEPHONE ENCOUNTER
Caller: [facility] - [place] - [phone]  - [phone]    Relationship: Pharmacy    Best call back number: [phone]    What was the call regarding: PHARMACY CALLING TO CHECK STATUS OF PA THAT WAS DENIED FOR OCTREOTIDE AND TO SEE IF WE WERE GOING TO APPEAL THE DENIAL

## 2024-12-03 NOTE — PROGRESS NOTES
The following PATIENT CALLS message was forwarded to my attention:        I returned the call to the number above and spoke with Tucker PINEDA. I clarified that we will not pursue a PA and that the prescription that we e-scribed on 11/27 is within the limits of the current active PA. I also asked that the prescription e-scribed on 10/23/24 be discontinued.    Tucker rollins/marcel and informed me that Ms. Weaver has scheduled delivery of the prescription e-scribed on 11/27 to arrive on 12/5.    Galina eParce - Care Coordinator   12/3/2024  11:12 EST

## 2024-12-04 ENCOUNTER — INFUSION (OUTPATIENT)
Dept: ONCOLOGY | Facility: HOSPITAL | Age: 44
End: 2024-12-04
Payer: COMMERCIAL

## 2024-12-04 ENCOUNTER — OFFICE VISIT (OUTPATIENT)
Dept: ONCOLOGY | Facility: CLINIC | Age: 44
End: 2024-12-04
Payer: COMMERCIAL

## 2024-12-04 VITALS
BODY MASS INDEX: 30.18 KG/M2 | HEART RATE: 81 BPM | WEIGHT: 187.8 LBS | OXYGEN SATURATION: 97 % | HEIGHT: 66 IN | DIASTOLIC BLOOD PRESSURE: 78 MMHG | SYSTOLIC BLOOD PRESSURE: 111 MMHG | TEMPERATURE: 97.8 F

## 2024-12-04 DIAGNOSIS — C78.00 MALIGNANT NEOPLASM METASTATIC TO LUNG, UNSPECIFIED LATERALITY: ICD-10-CM

## 2024-12-04 DIAGNOSIS — C20 ADENOCARCINOMA OF RECTUM: Primary | ICD-10-CM

## 2024-12-04 DIAGNOSIS — Z79.01 CHRONIC ANTICOAGULATION: Chronic | ICD-10-CM

## 2024-12-04 DIAGNOSIS — T45.1X5D ADVERSE EFFECT OF ANTINEOPLASTIC AND IMMUNOSUPPRESSIVE DRUGS, SUBSEQUENT ENCOUNTER: ICD-10-CM

## 2024-12-04 DIAGNOSIS — C20 ADENOCARCINOMA OF RECTUM: ICD-10-CM

## 2024-12-04 DIAGNOSIS — Z79.899 ENCOUNTER FOR LONG-TERM (CURRENT) USE OF HIGH-RISK MEDICATION: Primary | ICD-10-CM

## 2024-12-04 DIAGNOSIS — Z79.899 ENCOUNTER FOR LONG-TERM (CURRENT) USE OF HIGH-RISK MEDICATION: ICD-10-CM

## 2024-12-04 DIAGNOSIS — Z79.01 ANTICOAGULATION ADEQUATE: ICD-10-CM

## 2024-12-04 LAB
ALBUMIN SERPL-MCNC: 3.8 G/DL (ref 3.5–5.2)
ALBUMIN/GLOB SERPL: 1.2 G/DL
ALP SERPL-CCNC: 88 U/L (ref 39–117)
ALT SERPL W P-5'-P-CCNC: 22 U/L (ref 1–33)
ANION GAP SERPL CALCULATED.3IONS-SCNC: 13.8 MMOL/L (ref 5–15)
AST SERPL-CCNC: 24 U/L (ref 1–32)
BASOPHILS # BLD AUTO: 0.02 10*3/MM3 (ref 0–0.2)
BASOPHILS NFR BLD AUTO: 0.4 % (ref 0–1.5)
BILIRUB SERPL-MCNC: 0.4 MG/DL (ref 0–1.2)
BILIRUB UR QL STRIP: ABNORMAL
BUN SERPL-MCNC: 6 MG/DL (ref 6–20)
BUN/CREAT SERPL: 8.5 (ref 7–25)
CALCIUM SPEC-SCNC: 8.9 MG/DL (ref 8.6–10.5)
CHLORIDE SERPL-SCNC: 103 MMOL/L (ref 98–107)
CLARITY UR: CLEAR
CO2 SERPL-SCNC: 25.2 MMOL/L (ref 22–29)
COLOR UR: YELLOW
CREAT SERPL-MCNC: 0.71 MG/DL (ref 0.57–1)
DEPRECATED RDW RBC AUTO: 55.9 FL (ref 37–54)
EGFRCR SERPLBLD CKD-EPI 2021: 107 ML/MIN/1.73
EOSINOPHIL # BLD AUTO: 0.19 10*3/MM3 (ref 0–0.4)
EOSINOPHIL NFR BLD AUTO: 3.4 % (ref 0.3–6.2)
ERYTHROCYTE [DISTWIDTH] IN BLOOD BY AUTOMATED COUNT: 15 % (ref 12.3–15.4)
GLOBULIN UR ELPH-MCNC: 3.1 GM/DL
GLUCOSE SERPL-MCNC: 118 MG/DL (ref 65–99)
GLUCOSE UR STRIP-MCNC: NEGATIVE MG/DL
HCT VFR BLD AUTO: 44.7 % (ref 34–46.6)
HGB BLD-MCNC: 14.6 G/DL (ref 12–15.9)
HGB UR QL STRIP.AUTO: ABNORMAL
IMM GRANULOCYTES # BLD AUTO: 0.01 10*3/MM3 (ref 0–0.05)
IMM GRANULOCYTES NFR BLD AUTO: 0.2 % (ref 0–0.5)
KETONES UR QL STRIP: NEGATIVE
LEUKOCYTE ESTERASE UR QL STRIP.AUTO: ABNORMAL
LYMPHOCYTES # BLD AUTO: 1.32 10*3/MM3 (ref 0.7–3.1)
LYMPHOCYTES NFR BLD AUTO: 23.6 % (ref 19.6–45.3)
MCH RBC QN AUTO: 32.8 PG (ref 26.6–33)
MCHC RBC AUTO-ENTMCNC: 32.7 G/DL (ref 31.5–35.7)
MCV RBC AUTO: 100.4 FL (ref 79–97)
MONOCYTES # BLD AUTO: 0.47 10*3/MM3 (ref 0.1–0.9)
MONOCYTES NFR BLD AUTO: 8.4 % (ref 5–12)
NEUTROPHILS NFR BLD AUTO: 3.58 10*3/MM3 (ref 1.7–7)
NEUTROPHILS NFR BLD AUTO: 64 % (ref 42.7–76)
NITRITE UR QL STRIP: NEGATIVE
NRBC BLD AUTO-RTO: 0 /100 WBC (ref 0–0.2)
PH UR STRIP.AUTO: 6 [PH] (ref 4.5–8)
PLATELET # BLD AUTO: 207 10*3/MM3 (ref 140–450)
PMV BLD AUTO: 9.4 FL (ref 6–12)
POTASSIUM SERPL-SCNC: 3.4 MMOL/L (ref 3.5–5.2)
PROT SERPL-MCNC: 6.9 G/DL (ref 6–8.5)
PROT UR QL STRIP: ABNORMAL
RBC # BLD AUTO: 4.45 10*6/MM3 (ref 3.77–5.28)
SODIUM SERPL-SCNC: 142 MMOL/L (ref 136–145)
SP GR UR STRIP: 1.02 (ref 1–1.03)
UROBILINOGEN UR QL STRIP: ABNORMAL
WBC NRBC COR # BLD AUTO: 5.59 10*3/MM3 (ref 3.4–10.8)

## 2024-12-04 PROCEDURE — 80053 COMPREHEN METABOLIC PANEL: CPT

## 2024-12-04 PROCEDURE — 96367 TX/PROPH/DG ADDL SEQ IV INF: CPT

## 2024-12-04 PROCEDURE — 25010000002 LEUCOVORIN CALCIUM PER 50 MG: Performed by: INTERNAL MEDICINE

## 2024-12-04 PROCEDURE — 25010000002 PALONOSETRON PER 25 MCG: Performed by: INTERNAL MEDICINE

## 2024-12-04 PROCEDURE — 25010000002 FLUOROURACIL PER 500 MG: Performed by: INTERNAL MEDICINE

## 2024-12-04 PROCEDURE — 25810000003 SODIUM CHLORIDE 0.9 % SOLUTION 250 ML FLEX CONT: Performed by: INTERNAL MEDICINE

## 2024-12-04 PROCEDURE — 96413 CHEMO IV INFUSION 1 HR: CPT

## 2024-12-04 PROCEDURE — 25010000002 BEVACIZUMAB PER 10 MG: Performed by: INTERNAL MEDICINE

## 2024-12-04 PROCEDURE — 25810000003 SODIUM CHLORIDE 0.9 % SOLUTION: Performed by: INTERNAL MEDICINE

## 2024-12-04 PROCEDURE — 25010000002 DEXAMETHASONE SODIUM PHOSPHATE 100 MG/10ML SOLUTION: Performed by: INTERNAL MEDICINE

## 2024-12-04 PROCEDURE — 25010000002 FOSAPREPITANT PER 1 MG: Performed by: INTERNAL MEDICINE

## 2024-12-04 PROCEDURE — G0498 CHEMO EXTEND IV INFUS W/PUMP: HCPCS

## 2024-12-04 PROCEDURE — 81003 URINALYSIS AUTO W/O SCOPE: CPT

## 2024-12-04 PROCEDURE — 85025 COMPLETE CBC W/AUTO DIFF WBC: CPT

## 2024-12-04 PROCEDURE — 96375 TX/PRO/DX INJ NEW DRUG ADDON: CPT

## 2024-12-04 RX ORDER — SODIUM CHLORIDE 9 MG/ML
1000 INJECTION, SOLUTION INTRAVENOUS ONCE
Status: CANCELLED | OUTPATIENT
Start: 2024-12-06

## 2024-12-04 RX ORDER — SODIUM CHLORIDE 9 MG/ML
20 INJECTION, SOLUTION INTRAVENOUS ONCE
Status: COMPLETED | OUTPATIENT
Start: 2024-12-04 | End: 2024-12-04

## 2024-12-04 RX ORDER — PALONOSETRON 0.05 MG/ML
0.25 INJECTION, SOLUTION INTRAVENOUS ONCE
Status: COMPLETED | OUTPATIENT
Start: 2024-12-04 | End: 2024-12-04

## 2024-12-04 RX ORDER — PALONOSETRON 0.05 MG/ML
0.25 INJECTION, SOLUTION INTRAVENOUS ONCE
Status: CANCELLED | OUTPATIENT
Start: 2024-12-04

## 2024-12-04 RX ORDER — SODIUM CHLORIDE 9 MG/ML
20 INJECTION, SOLUTION INTRAVENOUS ONCE
Status: CANCELLED | OUTPATIENT
Start: 2024-12-04

## 2024-12-04 RX ADMIN — DEXAMETHASONE SODIUM PHOSPHATE 12 MG: 10 INJECTION, SOLUTION INTRAMUSCULAR; INTRAVENOUS at 10:56

## 2024-12-04 RX ADMIN — FOSAPREPITANT 100 ML: 150 INJECTION, POWDER, LYOPHILIZED, FOR SOLUTION INTRAVENOUS at 11:13

## 2024-12-04 RX ADMIN — SODIUM CHLORIDE 20 ML/HR: 9 INJECTION, SOLUTION INTRAVENOUS at 10:56

## 2024-12-04 RX ADMIN — BEVACIZUMAB 360 MG: 400 INJECTION, SOLUTION INTRAVENOUS at 11:44

## 2024-12-04 RX ADMIN — PALONOSETRON HYDROCHLORIDE 0.25 MG: 0.25 INJECTION INTRAVENOUS at 10:56

## 2024-12-04 RX ADMIN — FLUOROURACIL 3360 MG: 50 INJECTION, SOLUTION INTRAVENOUS at 12:45

## 2024-12-04 RX ADMIN — LEUCOVORIN CALCIUM 560 MG: 350 INJECTION, POWDER, LYOPHILIZED, FOR SUSPENSION INTRAMUSCULAR; INTRAVENOUS at 12:14

## 2024-12-04 NOTE — PROGRESS NOTES
REASON FOR FOLLOW UP:     Rectal cancer, rectal ultrasound examination in July 2019 reported T3N0 disease without lymphadenopathy. She does have small pulmonary nodule 6-7 mm and 2 mm with indeterminate feature. There is no solid evidence of metastatic disease otherwise. Patient has stage IIA (T3N0M0) disease.  The patient is heterozygous factor V Leiden, was on prophylactic anticoagulation with Lovenox 40 mg daily given her increased risk of thrombosis with MediPort and GI malignancy.   PET scan on 8/8/2019 reported an 8 mm hypermetabolic right upper lobe pulmonary nodule, which is suspicious for metastatic as well.    Patient had a PowerPort placement on the left upper chest by Dr. Joseph on 8/9/2019.  Patient was started on concurrent infusional 5-FU chemoradiation therapy on 8/12/2019, with planned complete surgical resection and further adjuvant chemotherapy with intention to cure the disease.   Patient underwent surgical resection of the primary rectal cancer by Dr. Ye on 12/2/2019.  Pathology evaluation reported residual T3N1 disease stage IIIb.  Diarrhea related to therapy and radiation.   Patient was started cycle 1 day 1 of adjuvant FOLFOX 6 on 1/23/2020.  On 2/5/2020 FOLFOX 6 cycle 2 delayed secondary to neutropenia.  Patient was given 3 days of Granix injection.  After cycle #2 we planned 3 days of Granix with each cycle.   Patient also intolerant of oral iron.  Patient received 2 doses of IV injectafer on 02/05/2020 and 02/12/2020.   02/12/2020 Proceed with cycle #2 of FOLFOX 6 with 3 days of Granix.  On 3/11/2020 cycle 4 postponed secondary to abdominal pain and occasional low-grade fevers.  CT scans ordered.  Cycle #4 resumed after CT scan revealed no evidence of disease.  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 Ye. patient has no fever.  Continue to observe.   Grade 3 hand-foot syndrome secondary to 5-FU after cycle #7  FOLFOX 6 chemotherapy, prompting ER visit.  Also has worsening peripheral neuropathy.   Cycle #8 FOLFOX 6 was given on 5/13/2020, with 50% dose reduction for both 5-FU and oxaliplatin, and also examination of bolus 5-FU.   PET scan examination on 5/21/2020 reported no evidence of metastatic disease in the chest abdomen pelvis.  Stable 6 mm RUL pulmonary nodule.  On 5/27/2020, I discussed with the patient that we can discontinue chemotherapy at this time.   Patient had a surgical procedure for low anterior colon resection, coloanal anastomosis on 8/3/2020.  CT scan for chest abdomen pelvis on 9/9/2020 reported a new 8 mm noncalcified pulmonary nodule in the left lower lobe of the lung.  Otherwise stable right upper lobe 6 mm pulmonary nodule, and no evidence of disease recurrence in the abdomen or pelvis.  PET scan examination on 9/18/2020 reported multiple hypermetabolic small pulmonary nodules/ new pulmonary nodules.   Patient was started on pelvic chemotherapy with FOLFIRI regimen on 10/13/2020.   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 It reported tumor mutation burden 5 Muts/Mb, microsatellite stable, TP53 mutation [id number], and others.   Cycle #5 was without irinotecan, due to peripheral neuropathy.  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  Patient was switched to weight-based Lovenox injection.  Cycle #6 5-FU and irinotecan was delayed by 2 weeks because of the above incident.  Patient had a telemedicine evaluation at that the [facility] [place] in February 2021.  They agreed with our treatment plan for palliative chemotherapy followed by maintenance chemotherapy.    PET scan examination on 4/6/2021 after cycle #12 palliative chemotherapy reported no evidence of hypermetabolic metastatic lesion.   Patient was started on maintenance chemotherapy with 5-FU and  Avastin on 4/13/2021. (Unable to tolerate Xeloda because of a significant hand-foot syndrome).   PET scan on 9/24/2021 obtained after cycle #12 maintenance chemotherapy with 5-FU, leucovorin, Avastin reported no evidence for active disease. We recommended 3 months chemotherapy holiday.  CT scan examination on 3/15/2022 reported slightly disease progression with increase in size of small pulmonary nodule.  We started patient back on maintenance chemotherapy on 3/29/2022 treatment with 5-FU plus leucovorin and Avastin, repeating every 2 weeks.  After 6 more maintenance chemotherapy, CT scan for chest abdomen pelvis was done on 6/21/2022 which reported stable sub-6 mm pulmonary nodules.  Patient had last maintenance chemotherapy on 6/7/2022.   Disease progression, patient was restarted back on chemotherapy with 5-FU leucovorin and bevacizumab 8/21/2024      HISTORY OF PRESENT ILLNESS:  The patient is a 44 y.o. female with the above-mentioned history, who is seen today for evaluation.       History of Present Illness  The patient is here today, 12/04/2024, for a 3-week follow-up evaluation before chemotherapy.    She reports that pain at her rib has completely healed, although she still experiences some soreness. This has improved over the past few days, allowing her to lie on her side.    She mentions having had two severe episodes of diarrhea, which she attributes to the Avastin medication. She believes that the Avastin is the agent causing her diarrhea, not the 5-Fu.    She also reports a slight urinary tract infection (UTI), for which she is taking Macrobid. This started 3 days ago and is improving.    Additionally, she mentions that his port was able to draw blood today.    Results  Laboratory Studies  WBC 5590. Neutrophils 3580. Hemoglobin 14.6. Platelets 207,000.            Past Medical History:   Diagnosis Date    Abdominopelvic abscess 08/12/2020    ADMITTED TO [facility]    Abnormal Pap smear of cervix 02/02/1998     JULIUS I    Abscess of abdominal wall 11/28/2012    SEEN AT [facility] ER    Anemia in pregnancy     Anxiety     Bilateral epiphora 04/2020    Bilateral hand swelling 05/02/2020    SEEN AT [facility] ER    Cervical lymphadenitis 06/06/2012    SEEN AT [facility] ER    Chemotherapy induced diarrhea 01/2021    Chemotherapy induced neutropenia 04/2020    Chemotherapy-induced nausea 02/2020    Chemotherapy-induced thrombocytopenia 05/2020    Chronic anticoagulation     Chronic diarrhea     Colon polyps     FOLLOWED BY DR. 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 ALEXANDRU HUNT    History of gestational diabetes     History of pre-eclampsia 1998    History of pre-eclampsia     History of radiation therapy 2019    FOLLOWED BY DR. 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 GERONIMO ESPARZA    Lump of right breast     SWOLLEN LYMPH NODE    Lung cancer 09/28/2020    METASTATIC LUNG CANCER    Macrocytosis  2021    Monopolar depression     On anticoagulant therapy     Pain associated with surgical procedure 2020    Palmar-plantar erythrodysesthesia 2021    Perirectal abscess 2020    Pulmonary embolism without acute cor pulmonale 09/20/2019    X 3; ADMITTED TO [facility]    Pulmonary nodule, right 2020    Rectal cancer 2019    STAGE IIA, INVASIVE MODERATELY DIFFERENTIATED ADENOCARCINOMA, CHEMO AND XRT FINISHED 2019    Right shoulder pain 2020    SEEN AT [facility] ER    Right ureteral stone 10/01/2019    SEEN AT [facility] ER    SAB (spontaneous ) 1996     A2-1 INDUCED    Sciatica     Sepsis due to cellulitis 2002    LEFT GREAT TOE, ADMITTED TO [facility]    Tachycardia 2020    Thrombosis of superior vena cava 2020    AND BRACHIOCEPHALIC VEIN, ADMITTED TO [facility]    Urinary urgency 2020   Patient had COVID-19 infection diagnosed on 2022 despite was fully vaccinated and boosted.  She recovered without problem.      Past Surgical History:   Procedure Laterality Date    ABDOMINAL SURGERY      CHOLECYSTECTOMY N/A 10/10/2003    LAPAROSCOPIC WITH CHOLANGIOGRAM, DR. JAMEY TALAVERA AT [facility]    COLON RESECTION N/A 2019    Procedure: laparoscopic low anterior colon resection with mobilization of splenic flexure and diverting loop ileostomy: ERAS;  Surgeon: Padmaja Esparza MD;  Location: [facility] OR;  Service: General    COLON RESECTION N/A 2020    Procedure: LOW ANTERIOR COLON RESECTION, COLOANAL ANASTOMOSIS, MOBILIZATION SPLENIC FLEXURE;  Surgeon: Padmaja Esparza MD;  Location: [facility] MAIN OR;  Service: General;  Laterality: N/A;    COLONOSCOPY N/A 07/15/2020    PATENT ANASTAMOSIS IN MID RECTUM, RESCOPE IN 1 YR, DR. PADMAJA ESPARZA AT [facility]    COLONOSCOPY N/A 2022    DIFFUSE AREA OF MODERATELY FRIABLE MUCOSA IN ENTIRE COLON , DIVERSION COLITIS, PATENT AND HEALTHY ANASTAMOSIS, DR. PADMAJA ESPARZA AT [facility]    COLONOSCOPY N/A 2023    6 MM SESSILE SERRATED ADENOMA  POLYP IN DESCENDING, PATENT ANASTAMOSIS IN DISTAL RECTUM, RESCOPE IN 2 YRS, DR. PADMAJA ESPARZA AT [facility]    COLONOSCOPY W/ POLYPECTOMY N/A 07/08/2019    15 MM TUBULOVILLOUS ADENOMA POLYP IN HEPATIC FLEXURE, 20 MMTUBULOVILLOUS ADENOMA WITH HIGH GRADE DYSPLASIA IN RECTOSIGMOID, 4 CM MASS IN MID RECTUM, PATH: INVASIVE MODERATELY DIFFERENTIATED ADENOCARCINOMA, DR. JENNIFER LI AT [facility]    DILATATION AND EVACUATION N/A 2009    ENDOSCOPY N/A 07/08/2019    LA GRADE A ESOPHAGITIS, GASTRITIS, ALL BIOPSIES BENIGN, DR. JENNIFER LI AT [facility]    ILEOSTOMY CLOSURE N/A 11/14/2022    Procedure: ILEOSTOMY TAKEDOWN;  Surgeon: Padmaja Esparza MD;  Location: [facility];  Service: General;  Laterality: N/A;    INCISION AND DRAINAGE PERIRECTAL ABSCESS N/A 08/14/2020    Procedure: INCISION AND DRAINAGE OF retrorectal dehiscence abcess with drain placement and irrigation;  Surgeon: Padmaja Esparza MD;  Location: [facility] OR;  Service: General;  Laterality: N/A;    PAP SMEAR N/A 01/24/2014    SIGMOIDOSCOPY N/A 07/24/2019    INFILTRATIVE PARTIALLY OBSTRUCTING LARGE RECTAL CANCER, AREA TATOOED, DR. PADMAJA ESPARZA AT [facility]    SIGMOIDOSCOPY N/A 11/23/2019    AN ULCERATED PARTIALLY OBSTRUCTING MASS IN MID RECTUM, AREA TATTOOED, DR. PADMAJA ESPARZA AT [facility]    SIGMOIDOSCOPY N/A 07/22/2021    PATENT ANASTAMOSIS IN DISTAL RECTUM, RESCOPE IN 1 YR, DR. PADMAJA ESPARZA AT [facility]    SIGMOIDOSCOPY N/A 09/11/2024    PATCHY INFLAMMATION AND EDEMA IN DESCENDING, AREA BIOPSIED AND WAS BENIGN, PATENT AND HEALTHY ANASTAMOSIS, HEMORRHOIDS,RESCOPE(CY) 12/2025, DR. PADMAJA ESPARZA AT [facility]    TRANSRECTAL ULTRASOUND N/A 07/24/2019    Procedure: ULTRASOUND TRANSRECTAL;  Surgeon: Padmaja Esparza MD;  Location: [facility] ENDOSCOPY;  Service: General    URETEROSCOPY LASER LITHOTRIPSY WITH STENT INSERTION Right 10/30/2019    DR. ESTUARDO BEASLEY AT [place]    VAGINAL DELIVERY N/A 09/18/1998    VENOUS ACCESS DEVICE (PORT) INSERTION Left 08/09/2019     Procedure: INSERTION VENOUS ACCESS DEVICE;  Surgeon: Sj Joseph MD;  Location: [facility];  Service: General    VENOUS ACCESS DEVICE (PORT) INSERTION N/A 12/18/2020    Procedure: INSERTION VENOUS ACCESS DEVICE right side, removal venous access device left side;  Surgeon: Sj Joseph MD;  Location: [facility];  Service: General;  Laterality: N/A;    WISDOM TOOTH EXTRACTION Bilateral 1993       HEMATOLOGIC/ONCOLOGIC HISTORY:      The patient reports she has intermittent rectal bleeding and urgency, mucous with her stool, starting sometime in 2016. At that time she was referred to GI service, and was diagnosed of irritable bowel syndrome. Nevertheless she had worsening urgency for bowel movement, and had a couple of incidents losing control of stool. She was recently seen by Roland Thorpe MD again and had colonoscopy and EGD exam on 07/08/2019. She was found having a circumferential rectal mass. According to the procedure note, the patient had a fungating circumferential bleeding 4 cm mass in the middle rectum, at distance between 13 cm and 17 cm from the anus. Mass was causing partial obstruction. There were also colon polyps found at the hepatic flexure and also at the rectosigmoid junction 23 cm from the anus. Both were resected and retrieved. EGD examination reported grade A esophagitis at the GE junction and patchy discontinuous erythema and aggravation of the mucosa without bleeding in the stomach body. There is normal mucosa of the duodenum. Pathology evaluation from this procedure reported moderately differentiated adenocarcinoma involving the rectal mass. The rectal sigmoid junction polyp was tubular/tubulovillous adenoma with high grade dysplasia negative for invasive malignancy. The hepatic flexure polyp was also tubular/tubulovillous adenoma negative for high grade dysplasia or malignancy. The biopsy from the esophagus reports squamocolumnar mucosa with inflammatory changes suggestive of  mild reflux esophagitis, negative for interstitial metaplasia. Gastric biopsy was benign and duodenal biopsy was also benign. There is no mention of H-pylori.     Patient was subsequently referred to colorectal surgeon Padmaja Ye MD for further evaluation. The patient had CT scan examination for chest, abdomen and pelvis requested by Dr. Ye and were done on 07/13/2019. The study reported no evidence of lymphadenopathy in the abdomen and pelvis. There was wall thickening of the rectosigmoid junction. Normal spleen, pancreas, adrenal glands and kidneys. There was fatty liver infiltration but no focal lymphatic lesions. There was a small 6-7 mm noncalcified nodule in the right upper lobe and a tiny 3 mm subpleural nodule in the right middle lobe. No mediastinal or hilar lymphadenopathy.     Dr. Ye performed staging rectal ultrasound examination. This study reported tumor penetrating through the muscularis propria as T3 disease; there were no lymph nodes identified.    She had a hospitalization in late September 2019 because of newly discovered pulmonary emboli.  She was on prophylactic Lovenox prior to the incident of PE.  Now she is on full dose Xarelto anticoagulation.  Patient reports no further chest pain dyspnea.  She denies bleeding or bruising.  During her hospitalization, she was seen by Dr. Ye, who plans to have surgery 8 to 12 weeks after finishing radiation therapy.  She finished her radiation on 9/19/2019.     I noticed patient also presented to the emergency room on 10/1/2019 complaining of right flank area pain.  I reviewed the images studies and indeed she has a very small 1 to 2 mm obstructing kidney stone in the UV junction.  Patient is still symptomatic with some pains and dysuria.  She denies fever sweating or chills.    Laboratory study on 10/7/2019 reported normal CBC including hemoglobin 13.1, platelets 301,000, WBC 6170 and ANC 4900 lymphocytes 590 monocytes 480.      The nurse  reported malfunction of port-a-catheter on 10/7/2019.  Port study on 10/8/2019 showed fibrin sheath around the distal aspect of the Mediport catheter in the SVC. This does not appear to hinder injection, but does prevent aspiration at this time.    Patient underwent surgical resection of the colon on 12/2/2019 with Dr. Ye.  Pathology evaluation reported residual T3 disease, also 1 out of 14 lymph nodes positive for malignancy.  So this patient in either had at least stage IIIb disease (T3 N1 M0?).      Adjuvant chemotherapy FOLFOX 6 will be started on 1/22/2020.  Laboratory study reported iron saturation 10%, free iron 31 TIBC 319 and ferritin 168.  Her hemoglobin was 11.8, WBC 5600, and platelets 347,000.    Patient was here on 02/12/2020 for cycle 2 of her FOLFOX.  This is delayed x1 week secondary to neutropenia.  The patient ultimately received 3 days worth of Neupogen with recovery of her blood counts.  Of note, the patient struggled with significant nausea without any episodes of vomiting following cycle #1 of chemotherapy resulting in significant weight loss.  She is up 12 pounds over the last week as her appetite has normalized.  We will add Emend to her care plan.    She is having several loose stools per her colostomy and has been hesitant to take Imodium due to prior history of constipation.  I encouraged her to try this with a maximum of 8 tablets/day.  She denies any infectious symptoms including fevers or chills.  No excess bleeding or bruising noted.  She had the expected cold sensitivity related to the Oxaliplatin for about 3 days following treatment.    Labs from 02/12/2020 demonstrated total white blood cell count of 5.14, ANC of 3.06, hemoglobin of 11.2, platelets of 211,000.  She was found to be iron deficient last week and is intolerant to oral iron secondary to GI distress.  For this reason, she initiated IV iron therapy with Injectafer last week.  She had received her second dose  02/12/2020    Patient has normalized hemoglobin 12.2 on 2/26/2020.    She reported on 5/5/2020 she had a recent visit to the emergency department for what was suspected to be an allergic reaction.  She called our on-call service on Saturday with reports of hand and face swelling.  She was instructed to proceed to the emergency department at which time she was assessed and prescribed a Medrol Dosepak.  She had just completed her 5-FU and was unhooked on Friday, 5/3/2020.  Her symptoms have improved.  She does report persistent hyperpigmentation and mild swelling of the palms of the hands but this is much improved.  It was her right hand specifically that was swollen.  Her facial swelling has resolved.  She continues on Cefdinir nd since has 1 day remaining, she was prescribed cefdinir for a UTI requiring hospitalization at the end of April.  Reports no new symptoms.  Her labs are stable.  She is scheduled for treatment again.    Patient states at this time she is not tolerating her chemotherapy well.     Patient seen in the emergency department on 5/2/2020 for what was suspected to be an allergic reaction.  She called our on-call service on Saturday explaining that she was experiencing hand and face swelling.  She was instructed to proceed to the emergency department at which time she was assessed and prescribed a Medrol Dosepak.  She had just completed her 5-FU and was unhooked on Friday, 5/1/2020.      She reports since her ED visit on 5/2/2020 her symptoms have not improved. Her hands and feet were swollen upon presenting to the ED and she could barely make a fist. She states that she feels so swollen she is not able to stand it. She states that her skin on the hands and feet are peeling extensively as well, besides changing color to more dark.     She also reports significant fatigue after her first week of the 5-FU treatment but she expected this side effect. She also notices significant increase in her  neuropathy to the point that she is not able to even walk around in her home without her house slippers due to irritation from her rugs. She denies and associated nausea or vomiting at this time. She also has episodes of epistaxis every day after the chemotherapy cycle #7.  She does report working full-time during the week of chemotherapy.    Laboratory studies, 5/13/2020, show moderate thrombocytopenia platelets 123,000, low normal WBC 4140 including ANC 2720 and normal hemoglobin 13.4.  Because significant hand-foot syndrome, will decrease both 5-FU and oxaliplatin by 50%, and eliminate bolus dose of 5-FU.    On 5/21/2020 patient had a PET scan performed which showed no convincing evidence of residual disease in abdomen or pelvis, no metastatic disease within the chest or neck.     Cycle #8 FOLFOX 6 was given on 5/13/2020, with 50% dose reduction for both 5-FU and oxaliplatin, and also examination of bolus 5-FU.  She states on 5/27/2020 that with the recent reduction of the chemotherapy she feels significantly better. She has more energy and is able to do her daily routine.      PET scan examination on 5/21/2020 reported no evidence of metastatic disease in chest abdomen pelvis.  There was a stable 6 mm right upper lobe pulmonary nodule.    Laboratory studies on 5/27/2020 showed mild leukocytopenia WBC 3720 but a normal ANC 2250 and lymphocytes 630.  Normal platelets 163,000 and hemoglobin 12.6.  Chemistry lab reported normal renal function, liver function panel and a electrolytes, elevated glucose 164.    Laboratory studies 6/24/2020, showed normal hemoglobin 13.4 but macrocytosis .9.  Normal platelets 210,000 and WBC 5870.  She had normal CMP.     Patient last time was here in late June 2020.  Since that time she had surgical procedure for low anterior colon resection, coloanal anastomosis on 8/3/2020.  She later developed a perirectal abscess, required surgical drainage on 8/14/2020.  She was discharged  on 8/27/2020.    Patient reports to me she still has lower abdominal wall vacuum suction in place.  She denies fever sweating chills.  Performance status is ECOG 1.  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  She does have visiting nurse come to the home for wound care.    CT scan for chest abdomen pelvis on 9/9/2020 reported a new 8 mm noncalcified pulmonary nodule in the left lower lobe.  Otherwise stable right upper lobe 6 mm pulmonary nodule, and no evidence of disease recurrence in the abdomen or pelvis.     Laboratory study on 9/16/2020 reported elevated AST 55, ALT 95, alk phosphatase 143 but normal total bilirubin 0.3.  Chemistry lab otherwise unremarkable.  She has completed normal CBC.      Because of the abnormal CT scan examination for chest abdomen pelvis on 9/9/2020 reported a new 8 mm noncalcified pulmonary nodule in the left lower lobe, we obtained a PET scan examination for further evaluation, which was done on 9/18/2020.  This study reported several pulmonary nodules, some of them are hypermetabolic, newly developed compared to previous PET scan in May 2020.  Certainly does highly suspicious for metastatic disease.  There are also hypermetabolic activity in the abdomen and pelvic area which is hard to differentiate from surgical scar versus metastatic malignancy.    Laboratory study on 10/13/2020 reported normal CBC with Hb 13.9, platelets 302,000 and WBC 5520 including ANC 3830.  Chemistry lab reported normalized AST 20, alk phosphatase 116 improved ALT 35, and maintains normal bilirubin, with normal electrolytes and liver function panel.     Patient was started on first cycle of palliative chemotherapy FOLFIRI on 10/13/2020.    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  Patient had a new port placed  12/18/2020, and had held her Xarelto for 2 days prior to surgery.  She presented to the ER on 12/21/20 with complaints of facial and arm swelling for 3 days.  She also noted increased shortness of breath.  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  Thrombus within the right internal jugular vein cannot be excluded.  Patient was started on IV heparin, and eventually transitioned to weight-based Lovenox, which she now continues.    PET scan examination on 9/24/2021 reported further shrinking of the tiny right upper lobe pulmonary nodule.  Otherwise no new lesions.  No evidence for metastatic disease in other areas.      Patient had CT scan for chest with IV contrast obtained on 12/14/2021 which reported small tiny stable pulmonary nodules. There is a 4 mm right upper lobe pulmonary nodule. There is also a stable 4 mm left lower lobe pulmonary nodule. There is also stable left upper lobe micronodule.     Laboratory studies today on 12/21/2021 reported normal hemoglobin 15.9 however .0, platelets 218,000 WBC 5360 including neutrophils 3730 lymphocytes 980. Chemistry lab reported normal renal function, electrolytes, glucose, and marginally elevated ALT 55, AST 34 but normal total bilirubin and alk phosphatase.     CT scan for chest abdomen pelvis 6/21/2022 reported sub-6 mm pulmonary nodules either stable or slightly smaller.  No new pulmonary nodules or evidence for disease progression.  There is no evidence for metastatic disease in the liver however it shows diffuse hepatic steatosis.  There was masslike thickening in the presacral space with the clips or calcification approximately 1.7 cm in greatest AP dimension but stable.    Laboratory study on 9/20/2022 reported normal hemoglobin 15.7 with mild macrocytosis .1.  She has normal CBC and platelets.  She also has unremarkable CMP.  Normal CEA 1.13 ng/mL.    Patient was seen by Dr. Ye, who performed ileostomy takedown  on 11/14/2022.  Patient reports that she is recovering.  She still has a small open wound less than 1 cm in diameter, however close to 2 cm deep.  She has been changing dressing herself.  She denies fever sweating chills.    Patient has no other specific complaints.  She has excellent performance status ECOG 0.  She denies chest pain dyspnea cough hemoptysis.  No abdominal pain.  No melena hematochezia.  Patient has been eating well, stable weight.  She works full-time.    She continues Lovenox injections and denies any significant bleeding issues.   No other new problems or concerns.     CT scan for chest abdomen pelvis obtained on 1/10/2023 reported stable small pulmonary nodules, no evidence for active or new disease.    Laboratory study on 1/10/2023 reported normal CBC and normal CMP.  CEA level is 0.99 ng/mL.    CT scan for chest, abdomen, and pelvis on 7/7/2023 reported stable small pulmonary nodules including a left upper lobe 5 mm nodule. There were no new masses, or pleural effusion. No enlarged supraclavicular, axillary, mediastinal, or hilar lymphadenopathy. Mediastinal vasculature normal. There is occlusion of the superior vena around the catheter with body wall  is present. There was no evidence for disease recurrence in the abdomen or pelvis. Bone is also negative for metastatic disease.    Laboratory studies obtained on 07/07/2023 also reported normal CBC, and normal CMP as well as low level CEA 1.07 ng/mL.    The CT scan for the chest on 10/27/2023 reported enlarging pulmonary nodules bilaterally. The right upper lobe lung nodule increased from 7 x 7 mm to 9 x 8 mm, and the left upper lobe nodule measured 8 x 9 mm from 5 x 6 mm in 04/2023. There is a different left upper lobe nodule 6 x 8 mm from previous 5 x 5 mm. The presacral tissue thickness measures up to 2.3 cm.    A laboratory study on 10/27/2023 reported CEA 1.58 ng/mL, normal CBC, and CMP.  Molecular study of peripheral blood  Guardant Reveal is still pending.    The patient presents today on 11/17/2023 for reevaluation to discuss the results of PET scan examination as well as the results of tumor conference. I saw her recently on 11/03/2023 and because of enlarging pulmonary nodules on the CT scan, we requested a PET scan examination. I presented her case yesterday on 11/16/2023 at the [facility].    The patient reports she is at her baseline condition as 2 weeks ago. No specific complaints, no chest pain, dyspnea, cough, etc. She has a regular bowel movement.    Her case was present at the [facility]. on 11/16/2023. The PET scan images and chest CT scan were reviewed in conjunction with her treatment history. The consensus was for patient to receive stereotactic radiation therapy for the right upper lobe lung lesion, and the bigger left upper lobe lesion, and monitor the smaller left upper lobe lesion with further images studies down the line. Also, the conference recommended Guardant Reveal study which we already ordered.     This Guardant Reveal study came back today as negative for ctDNA 0%.        CT of the chest on 2/2/2024 reported shrinking of the right upper lobe lesion from 9 mm to 6 mm, and the left upper lobe lesion also decreased from 9 mm to 6 mm. Otherwise, there are a couple of stable pulmonary nodules, 7 to 8 mm. The right hilar has a small lymph node that has increased from 6 mm to 8 mm and is otherwise stable. There was no suspicious lesion in the abdomen or pelvis.    Colonoscopy examination 9/11/2024 showed patchy mild inflammation characterized by congestion/edema in the descending colon. Evidence of previous endo coloanal anastomosis in the rectum. Presence of hemorrhoids.      MEDICATIONS    Current Outpatient Medications:     bismuth subsalicylate (PEPTO BISMOL) 262 MG/15ML suspension, Take 15 mL by mouth 4 (Four) Times a Day., Disp: , Rfl:     clonazePAM (KlonoPIN) 1 MG  tablet, Take 1 tablet as needed daily for anxiety. May take one additional as needed for severe anxiety., Disp: 45 tablet, Rfl: 3    Cyanocobalamin (VITAMIN B-12 PO), Take 1 tablet by mouth 3 (Three) Times a Week. M-W-F, Disp: , Rfl:     dicyclomine (BENTYL) 20 MG tablet, TAKE 1 TABLET BY MOUTH EVERY 6 (SIX) HOURS AS NEEDED FOR IRRITABLE BOWEL SYMPTOMS (Patient taking differently: Take 1 tablet by mouth Every Evening.), Disp: 360 tablet, Rfl: 2    diphenoxylate-atropine (LOMOTIL) 2.5-0.025 MG per tablet, TAKE 2 TABLETS BY MOUTH 4 TIMES A DAY, Disp: 240 tablet, Rfl: 11    Enoxaparin Sodium (LOVENOX) 100 MG/ML solution prefilled syringe syringe, INJECT 1 ML UNDER THE SKIN INTO THE APPROPRIATE AREA AS DIRECTED EVERY 12 (TWELVE) HOURS., Disp: 60 mL, Rfl: 2    escitalopram (LEXAPRO) 10 MG tablet, Take 1 tablet by mouth Daily., Disp: 90 tablet, Rfl: 1    famotidine (PEPCID) 20 MG tablet, TAKE 1 TABLET BY MOUTH TWICE A DAY (Patient taking differently: Take 1 tablet by mouth Every Evening.), Disp: 180 tablet, Rfl: 2    HYDROcodone-acetaminophen (NORCO) 5-325 MG per tablet, Take 1 tablet by mouth Every 6 (Six) Hours As Needed for Moderate Pain., Disp: 60 tablet, Rfl: 0    hydrocortisone 2.5 % cream, APPLY RECTALLY 3 TIMES DAILY. INCLUDE APPLICATOR., Disp: , Rfl:     Hydrocortisone, Perianal, (Anusol-HC) 2.5 % rectal cream, Apply rectally 3 times daily.  Include applicator., Disp: 30 g, Rfl: 1    levoFLOXacin (Levaquin) 750 MG tablet, Take 1 tablet by mouth Daily., Disp: 7 tablet, Rfl: 0    Loperamide HCl (IMODIUM PO), Take  by mouth As Needed., Disp: , Rfl:     Loratadine 10 MG capsule, Take 1 capsule by mouth Every Evening., Disp: , Rfl:     metoprolol succinate XL (TOPROL-XL) 25 MG 24 hr tablet, TAKE 0.5 TABLETS BY MOUTH EVERY NIGHT, Disp: 45 tablet, Rfl: 2    nystatin (MYCOSTATIN) 310300 UNIT/GM cream, Apply 1 Application topically to the appropriate area as directed 2 (Two) Times a Day., Disp: 30 g, Rfl: 2     "nystatin-hydrocortisone-bacitracin in zinc oxide ointment, Apply  topically to the appropriate area as directed Daily., Disp: 60 g, Rfl: 2    octreotide (sandoSTATIN) 100 MCG/ML injection, Inject 1 mL under the skin into the appropriate area as directed 3 (Three) Times a Day., Disp: 90 mL, Rfl: 2    ondansetron (ZOFRAN) 8 MG tablet, TAKE 1 TABLET BY MOUTH THREE TIMES A DAY AS NEEDED FOR NAUSEA AND VOMITING, Disp: 60 tablet, Rfl: 1    pantoprazole (Protonix) 40 MG EC tablet, Take 1 tablet by mouth Daily., Disp: 90 tablet, Rfl: 1    rosuvastatin (CRESTOR) 5 MG tablet, TAKE 1 TABLET BY MOUTH EVERY DAY, Disp: 90 tablet, Rfl: 0    Syringe/Needle, Disp, 27G X 1/2\" 1 ML misc, Use as directed for octreotide injections, Disp: 100 each, Rfl: 3    ALLERGIES:   No Known Allergies    SOCIAL HISTORY:       Social History     Tobacco Use    Smoking status: Every Day     Current packs/day: 1.00     Average packs/day: 1 pack/day for 25.0 years (25.0 ttl pk-yrs)     Types: Cigarettes     Passive exposure: Current    Smokeless tobacco: Never    Tobacco comments:     1 PPD/caffeine use    Vaping Use    Vaping status: Some Days    Substances: Nicotine    Devices: Disposable    Passive vaping exposure: Yes   Substance Use Topics    Alcohol use: Not Currently     Comment: RARELY    Drug use: Never         FAMILY HISTORY:   Mother has positive factor V Leiden mutation, although she did not have thrombosis, mother also is coronary disease with stenting, she also is occasional bruising.    Maternal grandmother had DVT, she had multiple surgical procedures.    Patient mother's half-brother had metastatic colon cancer at diagnosis in his 50s.    Maternal great aunt had breast cancer.           Vitals:    12/04/24 1017   BP: 111/78   Pulse: 81   Temp: 97.8 °F (36.6 °C)   TempSrc: Oral   SpO2: 97%   Weight: 85.2 kg (187 lb 12.8 oz)   Height: 167.6 cm (65.98\")   PainSc: 0-No pain               12/4/2024    10:18 AM   Current Status   ECOG score 0 "     Physical Exam  Vitals reviewed.   Constitutional:       Appearance: Normal appearance. She is well-developed.   HENT:      Head: Normocephalic and atraumatic.      Comments:         Nose: Nose normal.   Eyes:      Conjunctiva/sclera: Conjunctivae normal.   Cardiovascular:      Rate and Rhythm: Normal rate and regular rhythm.   Pulmonary:      Effort: Pulmonary effort is normal.      Breath sounds: Normal breath sounds.   Abdominal:      General: Bowel sounds are normal.      Palpations: Abdomen is soft. There is no mass.      Tenderness: There is no abdominal tenderness.   Musculoskeletal:      Right lower leg: No edema.      Left lower leg: No edema.   Skin:     Findings: No bruising or rash.   Neurological:      Mental Status: She is alert. Mental status is at baseline.   Psychiatric:         Mood and Affect: Mood normal. Affect is not tearful.       RECENT LABS:  Results from last 7 days   Lab Units 12/04/24  1007   WBC 10*3/mm3 5.59   NEUTROS ABS 10*3/mm3 3.58   HEMOGLOBIN g/dL 14.6   HEMATOCRIT % 44.7   PLATELETS 10*3/mm3 207     Lab Results   Component Value Date    GLUCOSE 118 (H) 12/04/2024    BUN 6 12/04/2024    CREATININE 0.71 12/04/2024     12/04/2024    K 3.4 (L) 12/04/2024     12/04/2024    CALCIUM 8.9 12/04/2024    PROTEINTOT 6.9 12/04/2024    ALBUMIN 3.8 12/04/2024    ALT 22 12/04/2024    AST 24 12/04/2024    ALKPHOS 88 12/04/2024    BILITOT 0.4 12/04/2024    GLOB 3.1 12/04/2024    AGRATIO 1.2 12/04/2024    BCR 8.5 12/04/2024    ANIONGAP 13.8 12/04/2024    EGFR 107.0 12/04/2024                     Lab Results   Component Value Date    CEA 1.15 08/02/2024       IMAGING:  NM PET/CT Skull Base to Mid Thigh (08/14/2024 09:17)     NM PET/CT Skull Base to Mid Thigh  F-18 FDG PET SKULL BASE TO MID THIGH WITH PET CT FUSION.     HISTORY: 44-year-old female with metastatic rectal cancer. Restaging.     TECHNIQUE: Radiation dose reduction techniques were utilized, including  automated exposure  control and exposure modulation based on body size.   Blood glucose level at time of injection was 72 mg/dL. 5.8 mCi of F-18  FDG were injected and PET was performed from skull base to mid thigh. CT  was obtained for localization and attenuation correction. Time at  injection 1:38 p.m. PET start time 2:53 p.m. Normalization method:  patient weight. Compared with chest CTA 11/02/2024 and PET/CT  08/14/2024.     FINDINGS: Blood pool has a 2.4 max SUV, previously 2.5.  1. There is a new 2.8 x 1.3 cm ground-glass opacity at the lateral  aspect of the left apex with a 6.3 max SUV. The appearance is  nonspecific and this may represent an infectious or inflammatory  infiltrate. Reevaluation is recommended with a chest CT in 3 months.     Otherwise, the remaining pulmonary nodules are smaller than on the  previous PET/CT and are no longer hypermetabolic. A 6 mm pulmonary  nodule at the superior segment of the left lower lobe has a 0.8 max SUV,  previously 0.8 cm with 2.4 max SUV.     2. There is new hypermetabolic activity at a new healing fracture at the  anterior aspect of the right 2nd rib with a 5.7 max SUV.     3. There is no suspicious visceral activity within the abdomen or pelvis  and there is no hypermetabolic lymphadenopathy.     4. There is no suspicious hypermetabolic activity at the neck.           This report was finalized on 11/14/2024 1:27 PM by Dr. Caroline Solano M.D  on Workstation: [id number]         Assessment & Plan      ASSESSMENT:   1.  Metastatic rectal cancer.   Initial rectal ultrasound examination reported T3N0 disease without lymphadenopathy.   CT scan of chest, abdomen and pelvis reported no lymphadenopathy in the abdomen, pelvis or chest. She does have small pulmonary nodule 6-7 mm and 2 mm with indeterminate feature. There is no solid evidence of metastatic disease otherwise.   Based on the CT scan and rectal ultrasound imaging studies, this patient had stage IIA (T3N0M0) disease.   She had  PET scan examination on 8/8/2019 which reported a hypermetabolic right upper lobe pulmonary nodule 6 mm with SUV 5.6.  This is suspicious for metastatic disease however it is too small to be biopsied.  This patient may have stage IV disease.   She initiated concurrent radiation with continuous 5-FU on 8/12/2019.  Patient finished concurrent chemoradiation therapy.  Patient underwent surgical resection of the rectal tumor and diverting loop ileostomy on 12/2/2019 with Dr. Ye.  Pathology evaluation reported residual T3 disease, with 1 out of 14 lymph nodes positive for malignancy.  Certainly this patient has at least stage IIIb rectal cancer (T3N1M0?)  On 1/22/2020 adjuvant chemotherapy FOLFOX 6 regimen initiated.   On 2/5/2022 cycle #2 was delayed secondary to neutropenia.  She was given 3 days of Granix.   Emend added with cycle 3.  With improved nausea control  Continuing home Granix x3 days following 5-FU disconnect  3/11/2020 due for cycle 4, however, she is experiencing progressive abdominal pain and occasional fevers.   CT scan performed on 3/13/2020 reveals no evidence of progressive or recurrent disease.  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  Patient hospitalized 4/24-4/26/20 after cycle 5 of chemotherapy with acute UTI.  CT abdomen/pelvis noting fluid collection in the presacral region having diminished in size compared to CT dated 3/13/2020.  Patient was evaluated by Dr. Ye while in the hospital with further plans to evaluate possible colovaginal fistula following completion of chemotherapy.  Patient did respond to IV antibiotics and discharged home on oral cefdinir.  4/29/2020 cycle 6 of FOLFOX.  Urinary symptoms have resolved   Patient seen in the [facility] ED on 5/2/2020 for what was suspected to be an allergic reaction.  She called our service on Saturday explaining that she was experiencing hand and face swelling.  She was instructed to proceed to the emergency department at which time she  was assessed and prescribed a Medrol Dosepak.  She had just completed her 5-FU and was unhooked on Friday, 5/1/2020.  Her symptoms have resolved in the office on assessment, 5/5/2020.  The patient had grade 3 hand-foot syndrome based on symptomology.  Patient had cycle #8 of 5-FU on 5/13/2020. Due to her symptoms and poor tolerance to the 5-FU, her treatment dose will be reduced 50% for oxaliplatin and infusional 5-FU.  Bolus 5-FU will be discontinued..  On 5/21/2020 patient had a PET scan and it showed no evidence of of metastatic disease in the neck, chest, abdomen or pelvis.  There was fluid accumulation/abscess.   On 5/27/2020 discussed with the patient that we can discontinue chemotherapy at this time.  She will follow-up with Dr. Ye to discuss a possibility to reverse the ileostomy.  We likely will obtain anther PET scan in 3 to 4 months for reassessment.    Patient was seen by Dr. Ye on 6/19/2019 for evaluation and to discuss possible take down of her ileostomy.  She is scheduled to have a gastrografin enema on 7/2/2020 to evaluate for a fistula, and then a colonoscopy on 7/15/2020, both done by Dr. Ye.  She states that based on the above imaging and procedure findings, she may have a more extensive surgery done or just have her ileostomy reversed, which would likely be done in August 2020.  This will all be coordinated under the care of Dr. Ye.     CT scan from 09/09/2020 and also compared to her previous PET scan examination from 05/21/2020 and also the original PET scan from 08/09/2019.  The original PET scan there is a small right upper lobe pulmonary nodule 6 mm with SUV 5.6. So in the 05/2020 PET scan the nodule is still there but activity seems much less and this most recent CT scan examination also documented the preexisting small pulmonary nodule however there is a new left lower lobe pulmonary nodule 8 mm in size and I shared with the patient today this nodule is suspicious for  metastatic disease. Laboratory studies reported minimal CEA level 1.32 on 09/09/2020. Liver function panel was only marginally abnormal with the ALT 45, the remaining is normal. However laboratory study today showed worsening AST 55, ALT 95 and alkaline phosphatase 143 but is still normal, total bilirubin 0.3.   Recommended to have repeat PET scan examination for assessment because the left lower lobe pulmonary nodule is too small to be biopsied, and if the PET scan reports hypermetabolic lesion, I recommended to have stereotactic radiation therapy. Explained to patient that she is not a really good candidate to have thoracotomy for resection because she still has unhealed wound in the abdomen. Stereotactic radiation therapy will likely be a more feasible choice.   PET scan examination obtained on 9/18/2020 showed metastatic disease.  It reported a few hypermetabolic pulmonary nodules, and some new small pulmonary nodules, all of them highly suspicious for metastatic disease.  She also has hypermetabolic activity in the abdomen and pelvic area which could be related to scar tissue from her recent surgery versus metastatic disease.  Nevertheless overall picture fits with metastatic cancer.  Discussed with the patient on 9/20/2020, we reviewed the PET scan images together, and I recommended to have systematic chemotherapy, I do not think stereotactic reading therapy to the hypermetabolic lesions in the lungs warranted at this time because even those will be treated with reading therapy, she will still need systematic chemotherapy.  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  I did discuss with the patient using anti-EGFR monoclonal antibody versus anti-VEGF monoclonal antibody.     Palliative chemotherapy cycle#1 FOLFIRI was started on 10/13/2020.  No bolus 5-FU given considering previous poor tolerance.    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   Microsatellite stable, mutation burden 5/Mb.   Discussed with the patient 10/27/2020, because of the K-s[place]ia mutation, she is not a candidate for anti-EGFR monoclonal antibody such as Erbitux or vectibix.  She could be a candidate for anti-VEGF monoclonal antibody, however because of active wound, she is not a candidate right now at this moment.  Patient seen in the ED for acute right neck pain on 11/16/2020.  CT angiogram as well as CT of the cervical spine performed with no acute findings but notably one of her pulmonary nodules, left upper lobe, somewhat decreased in size.  Patient given prednisone pack.  Further details below.  Cycle #6 chemotherapy will be resumed on 1/5/2021.  Started back on original irinotecan.  PET scan examination obtained on 1/12/2021 after cycle #6 chemotherapy reported improved pulmonary nodule hypermetabolic activity.  Confirmed these are metastatic lesions.  Patient is evaluated 1/19/2020, will continue cycle #7 FOLFIRI chemotherapy.  However Avastin will be on hold see next section.   Patient was reevaluated on 2/2/2021, will continue chemotherapy cycle #8 FOLFIRI.  Avastin / biosimilar will be added.    She talked to [facility] specialist in mid February 2021, and had recommended palliative chemotherapy without surgical intervention of metastatic lung lesions.   On 3/2/2021, patient will proceed with cycle #10 FOLFIRI plus bevacizumab.  After 12 cycles, plan to start maintenance treatment.  3/16/2021 cycle 11.  Plus bevacizumab.  3/30/2021 cycle 12 FOLFIRI plus bevacizumab.  Having issues with worsening nausea despite premeds with dexamethasone, Aloxi, Emend, and Zofran at home.  Patient is requesting a dose of Phenergan, which is helped her in the past.  We will give this to her with treatment today.  PET scan examination on 4/6/2021 reported no evidence of hypermetabolic metastatic lesion.  Discussed with the patient on 4/13/2021, we reviewed the PET scan  results.  We recommended to switch to maintenance chemotherapy without irinotecan.  We will continue 5-FU and Avastin every 2 weeks.  We discussed possibility of switching to oral Xeloda treatment.  Discussed with the patient for side effects more so with hand-foot syndrome.  Patient is agreeable.   Xeloda 2000 mg twice daily 7 days on, 7 days off along with Avastin initiated 4/27/2021.  After only 5 doses of Xeloda significant hand-foot syndrome, Xeloda held. Patient requesting to be transitioned back to infusional 5-FU, as she felt that she tolerated infusional 5-FU without any problem.  The patient will receive 5-FU leucovorin and Avastin every 2 weeks.  Xeloda discontinued.  5/25/2021 continued cycle #4 maintenance 5-FU, leucovorin, Avastin tolerating this much better so far, we will continue this. Recommended to have 12 cycles of maintenance chemotherapy, then obtain PET scan for reevaluation.  We could discuss further treatment plan at that time.  9/14/2021 patient due for cycle 12 of additional maintenance 5-FU/leucovorin plus Avastin.  She continues to tolerate treatment well overall.  She is anxious in the office today with her Mediport not getting blood at first and also with upcoming scans being heavy on her mind.  She was instructed to take one of her Klonopin pills while here to help calm her mind.  Heart rate did improve from 140s down to 112.  Proceed with treatment today as scheduled.  PET scan examination on 9/24/2021 reported further shrinking of the right upper lobe tiny pulmonary nodule.  No other new lesions.  Discussed with patient on 9/24/2021 about further shrinking of the right upper lobe pulmonary nodule and no evidence for other new lesions. We recommended to have chemo break for 3 months with repeated CT scan for reevaluation.  Recent CT scan for chest 12/14/2021 and abdomen CT from 11/29/2021 reported no evidence for active disease. The right upper lobe pulmonary nodule, left lower lobe  pulmonary nodule minimal about 4 mm and stable. I discussed with patient today on 12/21/2021, recommended to give her another 3 months chemotherapy holiday and obtain CT scan afterwards for reevaluation. After discussion, patient is agreeable.   CT scan examination for chest abdomen and pelvis obtained on 3/15/2022 reported a slight increase of the left upper lobe pulmonary nodule 5 mm, from previous 3 mm.  The other pulmonary nodules were stable in size.  There is also small left upper lobe groundglass changes.   Dr. Nguyen reviewed images studies with the patient 3/23/2022, compared to multiple previous images including CT chest CT and PET scan, suspected disease progression.  Discussed with the patient, and I recommended to resume maintenance chemotherapy with 5-FU plus leucovorin plus Avastin repeating every 2 weeks, and obtaining CT scan for reassessment in 3 months.  Patient is agreeable.  Patient resumed maintenance chemotherapy on 3/29/2022 with 5-FU leucovorin and Avastin.   4/26/2022, proceed with cycle #27 maintenance 5-FU, leucovorin, and Avastin.  Patient continues to tolerate treatment well.    On 5/10/2022, we will proceed ahead with cycle #28 maintenance chemotherapy.  Plan to have CT scan after cycle #30.  5/24/2022 cycle 29 maintenance chemotherapy.  Continue to tolerate well.  6/7/2022 cycle #30 maintenance chemotherapy, continuing to tolerate well.   CT scan for chest abdomen pelvis with IV contrast obtained on 6/21/2022 reported sub-6 mm pulmonary nodules either stable or slightly smaller.  We reviewed the images with the patient together.  We discussed with the patient and recommended to hold chemotherapy for now with repeating CT scan in 3 months for reassessment.  CT scan of the chest abdomen pelvis 9/13/2022 reported stable condition, no evidence for recurrent or metastatic colon cancer.  On 1/10/2023 patient had a CT chest abdomen pelvis with reported no evidence for disease progression.   Laboratory study also showed normal CEA.   Patient had a CT scan for chest, abdomen, and pelvis obtained on 04/11/2023. This study reported very small pulmonary nodules, the right upper lobe nodule is stable to 6 mm, however, the left upper lobe and the left lower lobe nodule increased to 5 mm from previous 4 mm. I discussed with the patient today on 04/19/2023, I recommend to obtain another CT scan in 3 months for reassessment. The nodules are so small, they likely will not be picked up by PET scan examination.  CT scan examination of the chest, abdomen, and pelvis obtained on 7/7/2023 reported stable small pulmonary nodules. No evidence for disease progression or new lesions in chest, abdomen, and pelvis.  Discussed with patient today on 7/14/2023, however, patient is concerning for disease progression. I recommended to obtain Guardant Reveal for circulating tumor DNA study. If the study is positive, we will further obtain PET scan examination, otherwise, we will obtain CT scan for chest, abdomen, and pelvis in 3 months for reassessment.  The patient had CT scan for the chest, abdomen, and pelvis obtained on 10/27/2023, which reported worsening pulmonary nodules at bilateral upper lobes. Laboratory studies showed low normal CEA level and normal liver function panel. The Guardant Reveal study is still pending. I discussed this with the patient on 11/03/2023 and we shared the images, and I recommended having a PET scan examination for further assessment. I will present her case at the multimodality lung conference. If those lesions are deemed to be metastatic lesions, I recommend having stereotactic radiotherapy. I discussed it with the patient, and she voiced understanding and was agreeable with that strategy.  I presented her case at the multimodality chest conference 11/16/2023.  The consensus was for patient to receive stereotactic radiation therapy for the right upper lobe lung lesion, and the bigger left upper  lobe lesion, and monitor the smaller left upper lobe lesion with further images studies down the line. Also, the conference recommended Guardant Reveal study which we already ordered. I discussed with the patient today on 11/17/2023, we explained to the patient that the opinion of the conference and she is agreeable with this and prefers this strategy because of limited toxicity compared to long term commitment to starting chemotherapy again. I recommend to obtain a CT scan for the chest, abdomen, and pelvis with both IV and oral contrast for reassessment in 3 months for reassessment of metastatic lung lesions and thickness in the pelvis where the PET scan reported a low activity SUV 2.4 in the surgical bed.   The patient had steroid-induced radiation therapy for the right upper lobe and one of the left upper lobe lesions.  Her CT scan examination on 02/02/2024 reported shrinking nodules of the right upper lobe and one of the left upper lobe lesions, both from 9 mm down to 6 mm. There are 2 stable pulmonary nodules 7 mm. Nothing new.  02/09/2024, I discussed with the patient and recommended CT scan for the chest, abdomen, and pelvis in 3 months for reassessment. Guardant Reveal study was 0% ctDNA. We will also continue laboratory studies every 3 months for Guardant Reveal, together with routine labs, CBC, CMP, and CEA.  CT scan of the chest, abdomen, and pelvis conducted on 4/23/2024 revealed stable multiple pulmonary nodules, with no other new pulmonary nodules or evidence of disease recurrence or abnormal pelvis. The patient's liver function panel was normal, and low-level CEA level was also noted. The Guardant Reveal study was able to be obtained earlier due to regulatory rules, and we hugo obtain today the Guardant reveal study, be available within 10 to 14 days.   Given the patient's metastatic liver lesion, and lung lesions from colon cancer, careful monitoring is necessary. A chest CT scan with IV contrast  will be arranged in 3 months for reevaluation. The study will be reviewed again in 3 months, which will include CBC, CMP, and CEA level.   Imaging study with chest CT scan on 08/02/2024 reported multiple bilateral pulmonary nodules that are relatively stable. However, the Guardant Reveal reported positive ctDNA indicating disease progression. A subsequent PET scan examination on 08/14/2024 reported newly developed hypermetabolic pulmonary nodules. The highest SUV is 3.7, corresponding to an 8 mm new right upper lobe nodule, and there are several other hypometabolic nodules in the lungs.   8/21/2024 resumption of chemotherapy with 5-FU bevacizumab  Triage visit 8/28/2024 with intractable diarrhea that was unclear if this is secondary to chemotherapy or infectious etiology given her known chronic colitis, fever and abdominal pain.  Further management as outlined below  9/4/2024 per review today diarrhea has improved though she is having some difficulty with passage of gas and stool,?  Related to significant inflammation in the rectum versus tumor obstruction.  The passage of gas has improved in the last few days but she does still have to change positions on the toilet to get stool to pass without it being painful.  Discussed all of this with Dr. Nguyen and we will refer the patient urgently back to Dr. Ye for at the very least rectal examination in the office versus full repeated colonoscopy.  Because of the potential for examination or invasive procedures, we will hold bevacizumab today.  Because the patient had significant diarrhea last week and concerns that it may be related to chemotherapy we will decrease her 5-FU/leucovorin today by 30%.  If she does well we can go back to full dose with cycle 3.     9/18/2024 she presented for evaluation prior to cycle #3 of palliative chemotherapy with 5-FU, leucovorin, and bevacizumab. Given her recent biopsy on 09/11/2024, it is prudent to postpone the Avastin treatment  today to ensure safety.  Chemotherapy will be proceeded.      She presented for evaluation on 10/02/2024, tolerating chemotherapy except for diarrhea. This has been managed with octreotide. Proceed with cycle 4 chemotherapy today with 5-FU, leucovorin and resumption of bevacizumab.   Reevaluation 10/16/2024 due for cycle 5 5-FU, leucovorin, bevacizumab.  Due to ongoing significant diarrhea, bevacizumab will be placed on hold for potential surgical intervention.  Additionally, 5-FU will be dose reduced to 50%.  Additional adjustments as outlined below  Patient reviewed 10/30/2024 with improved tolerance to cycle five 5-FU, leucovorin.  We we will attempt to slightly escalate 5-FU dosing, increasing by 10% (40% dose reduction)  Patient did experience leakage of 5-FU, and return 10/31/2024 for evaluation of this.  She was sent for port dye study that showed correct location of her port, so 5-FU was resumed.  The patient's right rib pain is likely due to a new chair on the bone, as indicated by the PET scan. The PET scan revealed a new 2.8 x 1.3 cm groundglass opacity in the lateral aspect of the left apex with SUV 6.3. This appearance is nonspecific and may represent an infectious or inflammatory infiltrate. Continued monitoring and follow-up imaging are recommended.  The current regimen of 5-FU will be maintained, and Avastin will be reintroduced with full dose. 11/13/2024 will proceed ahead with 5-FU, leucovorin and bevacizumab.   Today 12/4/2024 She also reports two bad days of diarrhea, likely due to Avastin. The dosage of Avastin will be reduced to 4 mg/kg. The 5-FU dosage will be increased to 1680 mg/m².     2.  Intractable diarrhea due to chemotherapy.   Patient developed diarrhea on Saturday, 8/24/2024.  Is unclear if this is related to infection as she did have fevers at the time the diarrhea began or chemotherapy.  She does have chronic colitis noted on most recent PET scan, this therefore could be  diverticulitis flare  GI panel and C. difficile negative  8/29/2024 she did receive a single dose of octreotide  Begin empiric Flagyl 500 mg 3 times daily x 10 days  8/30/2024 discussed negative stool studies.  We will add Levaquin to already prescribed Flagyl to complete management of diverticulitis.  It is unclear if the patient's symptoms are related to diverticular flare given her previous abdominal pain and fever versus chemotherapy.  However, her symptoms are currently improved  9/4/2024 diarrhea has resolved.  Stools are now more soft with some form but she is having difficulty with passage of stool, having to change positions on the toilet to avoid pain.  We are referring back to Dr. Ye as detailed above.  She will finish out the Flagyl and Levaquin as prescribed having roughly 3 days left of both.  We will also adjust doses of 5-FU/leucovorin today with concern for potential chemotherapy-induced diarrhea.  If she does well this cycle we can increase back to full dose with cycle 3 per Dr. Nguyen.  On 9/18/2024 patient reports that she experienced significant diarrhea during cycle #2 chemotherapy on 09/04/2024, leading to a 30% dose reduction. Despite taking Lomotil 2 tablets four times a day, Imodium, and Pepto-Bismol, her diarrhea persisted. She received octreotide shots twice, which improved her symptoms from watery to mushy stools but did not fully resolve the issue. She will continue with the current regimen and consider adding octreotide to her home regimen if diarrhea starts at home. The potential side effects of octreotide, including nausea, were discussed.   10/1/2024 she reports using octreotide self-injection at home, which has significantly improved her diarrhea. Most of the time, she only requires it once a day and occasionally twice a day, depending on the severity of her diarrhea. She is satisfied with the results, which have improved her quality of life and ability to eat properly.   She  will also use Lomotil and the Imodium as needed.  Patient is very tearful in the office 10/16/2024 regarding debilitating diarrhea and interference with quality of life.  She questions potential surgical intervention and placement of colostomy due to ongoing pain in her rectum and burning of her skin from diarrhea.  We discussed adjustments today including increased dose of octreotide to 200 mcg 3 times daily for diarrhea.  Chemotherapy dose adjustments.  Diarrhea was slightly improved following most recent cycle of chemotherapy.  Continue supportive care  Stable condition today on 11/13/2024.  Today 12/4/2024 patient reports that Avastin may be the agent causing her diarrhea.  So we will decrease dose by 20% and to see how things going.      3.   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  Low-dose Lovenox discontinued and initiated Xarelto 20 mg daily.  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  Patient held Xarelto for 2 days prior to MediPort removal from the left chest wall and placement of new port in the right chest wall on 12/18/2020.  She resumed Xarelto that evening.  Progressive swelling in the neck, face, upper extremities prompting hospitalization with CT scan showing thrombosis in the right subclavian vein and SVC.  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  Although she had been off of Xarelto for a few days, clearly Xarelto was  not prohibiting progression of her thrombosis after she resumed treatment and there was some evidence to suggest thrombosis had been present at least in the innominate vein on prior scan in September but now appears chronic.  Current acute thrombosis involving SVC and right subclavian.  Patient was admitted and placed on heparin drip  Patient was evaluated by vascular surgery and intervention was not recommended for thrombolysis/thrombectomy.  On 12/22/2020, the patient developed worsening shortness of breath, increasing upper extremity edema consistent with worsening SVC syndrome.  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  Symptoms improved and patient was discharged 12/24/2020 on Lovenox 100 mg every 12 hours.    Returns 12/29/2020 for evaluation continuing Lovenox, overall tolerating it well.  No bleeding issues.  Improved edema 1/5/2021.  Will continue Lovenox weight-based every 12 hours.  On 1/19/2021 and 2/2/2021, patient reports improved facial swelling.  She however does have being bruise on her abdomen wall where she does Lovenox injection.  However she does not have a spontaneous epistaxis, gum bleeding or other bleeding.   On 2/2/2021, we discussed that side effects of Avastin/biosimilar related to thrombosis.  Since this patient already has been on Lovenox, I think the benefits from treatment for her cancer will outweigh that risk of thrombosis, will proceed ahead with Avastin.  I cautioned patient to watch out for signs of worsening thrombosis patient voiced understanding.   5/11/2021 Lovenox dosing will be adjusted due to patient's weight loss.  We discussed she needs 1 mg/kg.  Her syringes are 100 mg syringes she will do 0.9 mL subcu every 12 hours.  8/3/2021 Patient continues to have bruising on abdomen from lovenox injections.  Will need to monitor weight and adjust dosing in future  if further weight loss.   Stable condition on 9/28/2021.  Continue Lovenox anticoagulation.    Patient reports no chest pain no dyspnea no leg edema. However she had bruising and also most recently abnormal small abscess for which she actually went to ER on 11/29/2021, had I&D. The wound is healing. No further drainage. Denies fever sweating or chills. After discussion, she will continue Lovenox injection for now.   On 3/23/2022, patient reports good tolerance of Lovenox.  Nevertheless she still has small quantity of pus in the left lower abdomen abscess, she squeezes it every day to prevent it became worse.  She reports no fever sweating chills.  Recommended to start Augmentin 875 mg twice a day for 7 days.  She will continue Lovenox indefinitely.  On 4/12/2022, patient reports resolution of the small abscess at left lower abdominal wall.   On 5/10/2022, patient continues on Lovenox indefinitely.  No easy bleeding or bruising.  6/23/2022 continuing on Lovenox 1 mg/kg at a dose of 100 mg (patient weight is 96.6 kg today) every 12 hours.  On 1/17/2023, patient reports good tolerance, Lovenox will be continued.    Patient had a venous Doppler study on 02/05/2023, which reported acute left upper extremity DVT in the subclavian vein, axillary and the brachial vein, and also superficial thrombophlebitis in the basilic upper arm.   On 04/19/2023, patient reports that she was not compliant with anticoagulation at the time of recurrent DVT. She now is fully compliant and is using Lovenox.  Continues on anticoagulation without signs or symptoms of bleeding.  She does have occasional irritation and bleeding with wiping related to frequent diarrhea  Due to right sided pleuritic pain the patient was seen in the emergency department 10/22/2024 with a negative CT angiogram.  Without thrombosis.  Anticoagulation continued.  Patient had CTA again on 11/2/2024 due to continued pleuritic pain that was negative for PE.  11/13/2024  tolerating anticoagulation with Lovenox.      4.  Mild anemia.   She also has history of iron deficiency.  Iron studies reveal iron saturation of 10%.  She was started on oral iron but was unable to tolerate due to GI side effects.   Status post Injectafer 2/5/2020 and 2/12/2020   Improved hemoglobin 13.5 on 1/5/2021.  3/16/2020 when hemoglobin now up to 16.2, hematocrit 47.6.  Patient admits that she has started smoking again, and I have encouraged her to quit.  She denies any snoring or sleep apnea diagnosis.  Patient is not taking oral iron.  We will closely monitor.  3/30/2020 hemoglobin 15.7, HCT 47.5.  Patient states that she has cut back significantly on her smoking, although not quit yet.  I have encouraged her to continue working on this.  We will continue to closely monitor.  Hemoglobin 14.6 on 6/8/2021 at the meantime he has worsening macrocytosis .6.    Laboratory studies 6/22/2021 reported excellent folate > 20 ng/mL, however low normal B12 level 357 pg/mL.    On 7/6/2021, normal hemoglobin 15.8, .9 and HCT 47.2%.  Patient was asked to start oral vitamin B12 at 1000 mcg daily.   9/14/2021 hemoglobin 17.0, hematocrit 52.1.  Monitor.  On 9/28/2021 hemoglobin 16.1 hematocrit 48.5%, continue to monitor.   Lab study on 12/14/2021 reported excellent ferritin 296 and iron saturation 25% with free iron 104 TIBC 424. Hemoglobin was 15.2 with .2.   Normal hemoglobin 14.6 on 3/15/2022.  Iron study reported normal ferritin 129.5 ng/mL however slightly low iron saturation 11%.  Continue to monitor for now.  9/4/2024 hemoglobin is normal at 14.0  Hemoglobin 13.8 on 11/13/2024.  Today 12/4/2024 her hemoglobin is 14.6.     5.  Peripheral neuropathy secondary to chemotherapy.   This is mild involving bilateral hands and feet as reported on 9/16/2020.   Will avoid chemotherapy with oxaliplatin.  Stable mild neuropathy as of 11/10/2020  Patient reports worsening peripheral neuropathy and cycle #5  chemotherapy on 12/8/2020 with and without irinotecan.   On 1/5/2021, patient reports improvement of peripheral neuropathy, will add irinotecan back to chemotherapy.  Continue to monitor and adjust medication.  Stable.      6.  Depression.  Patient seen by JULIET Davenport on 11/9/2020.  She was started on mirtazapine.  Lexapro was discontinued.  This has definitely improved her mood with the patient feeling overall much better.  She is sleeping better.  Appetite is improved with her actually eating more than she wants to.    Condition is stable.  She plans to continue follow-up with supportive oncology.  Patient is very tearful in the office today regarding need for dose adjustment to chemotherapy due to significant toxicity.    7. Malfunction of the portal catheter  Patient reports there was no blood drawn from the portal catheter. CT scan of the chest on 7/7/2023 reported occlusion of the SVC around to the Port-A-Cath tubing. I recommend trying activase to see if it helps.  Otherwise, we may have to remove the catheter. Clinically, patient has no signs of SVC syndrome.  On 11/03/2023, the patient reports that her Port-A-Cath was able to be used for a CT scan for the injection of intravenous dye; however, there was no blood return, so we needed to draw from her arm for the blood test. We will continue to keep Port-A-Cath for now.  On 02/09/2024, the patient reports that the port was able to be flushed. However, no blood was returned. We will continue to flush every 6 weeks.  9/4/2024 she continues to have no blood return from her port but can taste saline as we flush at each time and is functioning well otherwise.  Monitor.  10162/2024 no specific problem.  Port dye study 10/31/2024 showing correct position of port.  Fibrin sheath present.  No issue today.    8. Internal hemorrhoids.  She has large internal hemorrhoids diagnosed during a flexible sigmoidoscopy on 09/11/2024. Hydrocortisone cream was prescribed  for treatment.     9.  This patient also has strong family history for malignancy   The patient had appointment with genetic counseling on September 3, 2019.  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    10. Hypertension.  Continue management of hypertension and follow up with PCP.    11.  Chest and back pain  Ports this has been ongoing for about 4 weeks.  Started in her right chest/rib cage.  Originally this pain had completely resolved, however returned when she received her next chemotherapy infusion. She felt like the pain worsened after she was unhooked from her 5-FU and that the pain spread to her right scapular/back area.  Since unhook she has continued with pain in her right chest/back that has required her to take pain medicine regularly which is not typical for her.  She has pain with deep inspiration and pain with certain movements.  She also has pain when she pushes on the tissue around her port, though there is no redness or warmth around her port site aside from red rash in the shape of a bandaid and she reports being sensitive to bandaids.  Reviewed all of patients symptoms with Dr. Nguyen who recommended obtaining doppler right upper extremity and PET scan for further evaluation of this severe right sided pain that has been evaluated by CTA without any signs of what could be causing her pain.  Of note she was seen in the ED 11/2/2024, had CT chest performed which was negative for PE.  EKG and troponin looked okay.  She was discharged home.    Healing fracture of the right 2nd rib. The PET scan on 11/12/2024 showed new hypermetabolic activity at a healing fracture on the anterior aspect of the right second rib with SUV 5.7. The patient should avoid activities that may exacerbate the pain and consider pain management options as needed.    12/4/2024 the patient reports improvement in rib pain, with the ability to lay on the affected side, though still experiencing some soreness and pain upon sneezing  or deep breathing.          PLAN:    Proceeded today with palliative chemotherapy 5-FU leucovorin and bevacizumab.  Avastin will be decreased to 4 mg/kg, and 5-FU will be increased to 1680 mg/m².   Patient return in 2 days to discontinue 5-FU infusion.  Continue octreotide, currently utilizing 100 mcg every 8 hours following chemotherapy  Continue Imodium and Lomotil as needed.  Continue Protonix 40 mg daily.   Continue Gas-X.   Continue anticoagulation with Lovenox.  Continue Magic barrier cream rectum as needed   Continue anticoagulation with Lovenox subcu injection every 12 hours.   Patient return in 3 weeks for reevaluation, prior to her next chemotherapy, this is 1 week delayed due to the Christmas holiday.  In the future we will resume every 2-week chemotherapy.      The patient is on a high risk medication requiring close monitoring for toxicity.      I adjusted her doses of chemotherapy agents today.      Dong Nguyen MD PhD  12/04/2024        CC:  Deepika Vela III NP-C   MD Alverto Bowers MD

## 2024-12-05 ENCOUNTER — TELEPHONE (OUTPATIENT)
Dept: ONCOLOGY | Facility: CLINIC | Age: 44
End: 2024-12-05

## 2024-12-05 ENCOUNTER — SPECIALTY PHARMACY (OUTPATIENT)
Dept: PHARMACY | Facility: HOSPITAL | Age: 44
End: 2024-12-05
Payer: COMMERCIAL

## 2024-12-05 NOTE — PROGRESS NOTES
The following PATIENT CALLS message was forwarded to my attention:          I returned the call and spoke with Jessica. I reiterated my 12/3 chart note with Jessica and confirmed that Ms. Weaver' octreotide is out for delivery. She confirmed that the above message is an error.     Galina Pearce - Care Coordinator   12/5/2024  10:48 EST

## 2024-12-05 NOTE — TELEPHONE ENCOUNTER
Pharmacy Name: [facility] - [address] - [phone]  - [phone]          Pharmacy representative name: RAMIRO    Pharmacy representative phone number: [phone]    What medication are you calling in regards to: OCREOTIDE    What question does the pharmacy have: PATIENT MEDICATION PRIOR AUTHORIZATION DENIED 11/26/24    REQUESTING OFFICE FILE AN APPEAL

## 2024-12-06 ENCOUNTER — INFUSION (OUTPATIENT)
Dept: ONCOLOGY | Facility: HOSPITAL | Age: 44
End: 2024-12-06
Payer: COMMERCIAL

## 2024-12-06 DIAGNOSIS — Z45.2 ENCOUNTER FOR FITTING AND ADJUSTMENT OF VASCULAR CATHETER: ICD-10-CM

## 2024-12-06 DIAGNOSIS — C20 ADENOCARCINOMA OF RECTUM: ICD-10-CM

## 2024-12-06 DIAGNOSIS — Z79.899 ENCOUNTER FOR LONG-TERM (CURRENT) USE OF HIGH-RISK MEDICATION: Primary | ICD-10-CM

## 2024-12-06 DIAGNOSIS — C78.00 MALIGNANT NEOPLASM METASTATIC TO LUNG, UNSPECIFIED LATERALITY: ICD-10-CM

## 2024-12-06 PROCEDURE — 25010000002 HEPARIN LOCK FLUSH PER 10 UNITS: Performed by: INTERNAL MEDICINE

## 2024-12-06 RX ORDER — SODIUM CHLORIDE 0.9 % (FLUSH) 0.9 %
10 SYRINGE (ML) INJECTION AS NEEDED
OUTPATIENT
Start: 2024-12-06

## 2024-12-06 RX ORDER — HEPARIN SODIUM (PORCINE) LOCK FLUSH IV SOLN 100 UNIT/ML 100 UNIT/ML
500 SOLUTION INTRAVENOUS AS NEEDED
OUTPATIENT
Start: 2024-12-06

## 2024-12-06 RX ORDER — HEPARIN SODIUM (PORCINE) LOCK FLUSH IV SOLN 100 UNIT/ML 100 UNIT/ML
500 SOLUTION INTRAVENOUS AS NEEDED
Status: DISCONTINUED | OUTPATIENT
Start: 2024-12-06 | End: 2024-12-06 | Stop reason: HOSPADM

## 2024-12-06 RX ORDER — SODIUM CHLORIDE 0.9 % (FLUSH) 0.9 %
10 SYRINGE (ML) INJECTION AS NEEDED
Status: DISCONTINUED | OUTPATIENT
Start: 2024-12-06 | End: 2024-12-06 | Stop reason: HOSPADM

## 2024-12-06 RX ADMIN — Medication 500 UNITS: at 10:44

## 2024-12-06 RX ADMIN — Medication 10 ML: at 10:44

## 2024-12-10 ENCOUNTER — OFFICE VISIT (OUTPATIENT)
Dept: INTERNAL MEDICINE | Facility: CLINIC | Age: 44
End: 2024-12-10
Payer: COMMERCIAL

## 2024-12-10 VITALS
HEART RATE: 77 BPM | DIASTOLIC BLOOD PRESSURE: 68 MMHG | WEIGHT: 187.5 LBS | BODY MASS INDEX: 31.24 KG/M2 | SYSTOLIC BLOOD PRESSURE: 122 MMHG | HEIGHT: 65 IN

## 2024-12-10 DIAGNOSIS — F33.9 MONOPOLAR DEPRESSION: Chronic | ICD-10-CM

## 2024-12-10 DIAGNOSIS — R00.0 TACHYCARDIA: Chronic | ICD-10-CM

## 2024-12-10 DIAGNOSIS — Z79.01 CHRONIC ANTICOAGULATION: Chronic | ICD-10-CM

## 2024-12-10 DIAGNOSIS — C20 ADENOCARCINOMA OF RECTUM: Primary | Chronic | ICD-10-CM

## 2024-12-10 DIAGNOSIS — Z01.419 GYNECOLOGIC EXAM NORMAL: ICD-10-CM

## 2024-12-10 PROCEDURE — 90656 IIV3 VACC NO PRSV 0.5 ML IM: CPT | Performed by: NURSE PRACTITIONER

## 2024-12-10 PROCEDURE — 99214 OFFICE O/P EST MOD 30 MIN: CPT | Performed by: NURSE PRACTITIONER

## 2024-12-10 PROCEDURE — 90471 IMMUNIZATION ADMIN: CPT | Performed by: NURSE PRACTITIONER

## 2024-12-10 NOTE — PROGRESS NOTES
Chief Complaint  Depression     Subjective:      History of Present Illness {CC  Problem List  Visit  Diagnosis   Encounters  Notes  Medications  Labs  Result Review Imaging  Media :23}     Carole Weaver presents to [facility] for:        Adenocarcinoma of rectum:      LV: oncology 12/4/24  Disease progression, patient was restarted back on chemotherapy with 5-FU leucovorin and bevacizumab 8/21/2024     Chronic diarrhea: she had flex sigmoid on 9/11/24    Octreotide has improved BMs so she can leave the home.     5FU now at high dose and avastin is on lower dose.     She has had a round of levaquin.  States cough cleared up.     Will have next PET in Feb.     She had follow up with oncology: 5/3/24  Given the patient's metastatic liver lesion, and lung lesions from colon cancer, careful monitoring is necessary. A chest CT scan with IV contrast will be arranged in 3 months for reevaluation. The study will be reviewed again in 3 months, which will include CBC, CMP, and CEA level.      Continues BB for tachycardia. Rate controlled.     Feels mood is stable on current regimen.     Needs referral to establish with new GYN.         Vaccine requested today : Flu    I have reviewed patient's medical history, any new submitted information provided by patient or medical assistant and updated medical record.      Objective:      Physical Exam  Constitutional:       Appearance: Normal appearance.   Cardiovascular:      Rate and Rhythm: Normal rate.      Pulses: Normal pulses.   Pulmonary:      Effort: Pulmonary effort is normal.      Breath sounds: Normal breath sounds.        Result Review  Data Reviewed:{ Labs  Result Review  Imaging  Med Tab  Media :23}     The following data was reviewed by: Deepika Vela III, NP-C on 12/10/2024  Common labs          11/8/2024    13:26 11/13/2024    09:09 12/4/2024    10:07   Common Labs   Glucose  130  118    BUN  6  6   "  Creatinine  0.61  0.71    Sodium  145  142    Potassium  4.1  3.4    Chloride  107  103    Calcium  9.0  8.9    Albumin  3.7  3.8    Total Bilirubin  0.2  0.4    Alkaline Phosphatase  83  88    AST (SGOT)  11  24    ALT (SGPT)  9  22    WBC 7.52  6.55  5.59    Hemoglobin 13.6  13.8  14.6    Hematocrit 40.2  43.6  44.7    Platelets 216  170  207             Vital Signs:   /68 (BP Location: Left arm, Patient Position: Sitting, Cuff Size: Adult)   Pulse 77   Ht 165.1 cm (65\")   Wt 85 kg (187 lb 8 oz)   BMI 31.20 kg/m²   Estimated body mass index is 31.2 kg/m² as calculated from the following:    Height as of this encounter: 165.1 cm (65\").    Weight as of this encounter: 85 kg (187 lb 8 oz).        Requested Prescriptions      No prescriptions requested or ordered in this encounter       Routine medications provided by this office will also be refilled via pharmacy request.       Current Outpatient Medications:     bismuth subsalicylate (PEPTO BISMOL) 262 MG/15ML suspension, Take 15 mL by mouth 4 (Four) Times a Day., Disp: , Rfl:     clonazePAM (KlonoPIN) 1 MG tablet, Take 1 tablet as needed daily for anxiety. May take one additional as needed for severe anxiety., Disp: 45 tablet, Rfl: 3    Cyanocobalamin (VITAMIN B-12 PO), Take 1 tablet by mouth 3 (Three) Times a Week. M-W-F, Disp: , Rfl:     dicyclomine (BENTYL) 20 MG tablet, TAKE 1 TABLET BY MOUTH EVERY 6 (SIX) HOURS AS NEEDED FOR IRRITABLE BOWEL SYMPTOMS (Patient taking differently: Take 1 tablet by mouth Every Evening.), Disp: 360 tablet, Rfl: 2    diphenoxylate-atropine (LOMOTIL) 2.5-0.025 MG per tablet, TAKE 2 TABLETS BY MOUTH 4 TIMES A DAY, Disp: 240 tablet, Rfl: 11    Enoxaparin Sodium (LOVENOX) 100 MG/ML solution prefilled syringe syringe, INJECT 1 ML UNDER THE SKIN INTO THE APPROPRIATE AREA AS DIRECTED EVERY 12 (TWELVE) HOURS., Disp: 60 mL, Rfl: 2    escitalopram (LEXAPRO) 10 MG tablet, Take 1 tablet by mouth Daily., Disp: 90 tablet, Rfl: 1    " "famotidine (PEPCID) 20 MG tablet, TAKE 1 TABLET BY MOUTH TWICE A DAY (Patient taking differently: Take 1 tablet by mouth Every Evening.), Disp: 180 tablet, Rfl: 2    HYDROcodone-acetaminophen (NORCO) 5-325 MG per tablet, Take 1 tablet by mouth Every 6 (Six) Hours As Needed for Moderate Pain., Disp: 60 tablet, Rfl: 0    hydrocortisone 2.5 % cream, APPLY RECTALLY 3 TIMES DAILY. INCLUDE APPLICATOR., Disp: , Rfl:     Hydrocortisone, Perianal, (Anusol-HC) 2.5 % rectal cream, Apply rectally 3 times daily.  Include applicator., Disp: 30 g, Rfl: 1    Loperamide HCl (IMODIUM PO), Take  by mouth As Needed., Disp: , Rfl:     Loratadine 10 MG capsule, Take 1 capsule by mouth Every Evening., Disp: , Rfl:     metoprolol succinate XL (TOPROL-XL) 25 MG 24 hr tablet, TAKE 0.5 TABLETS BY MOUTH EVERY NIGHT, Disp: 45 tablet, Rfl: 2    nystatin (MYCOSTATIN) 306280 UNIT/GM cream, Apply 1 Application topically to the appropriate area as directed 2 (Two) Times a Day., Disp: 30 g, Rfl: 2    nystatin-hydrocortisone-bacitracin in zinc oxide ointment, Apply  topically to the appropriate area as directed Daily., Disp: 60 g, Rfl: 2    octreotide (sandoSTATIN) 100 MCG/ML injection, Inject 1 mL under the skin into the appropriate area as directed 3 (Three) Times a Day., Disp: 90 mL, Rfl: 2    ondansetron (ZOFRAN) 8 MG tablet, TAKE 1 TABLET BY MOUTH THREE TIMES A DAY AS NEEDED FOR NAUSEA AND VOMITING, Disp: 60 tablet, Rfl: 1    pantoprazole (Protonix) 40 MG EC tablet, Take 1 tablet by mouth Daily., Disp: 90 tablet, Rfl: 1    rosuvastatin (CRESTOR) 5 MG tablet, TAKE 1 TABLET BY MOUTH EVERY DAY, Disp: 90 tablet, Rfl: 0    Syringe/Needle, Disp, 27G X 1/2\" 1 ML misc, Use as directed for octreotide injections, Disp: 100 each, Rfl: 3     Assessment and Plan:      Assessment and Plan {CC Problem List  Visit Diagnosis  ROS  Review (Popup)  Health Maintenance  Quality  BestPractice  Medications  SmartSets  SnapShot Encounters  Media :23} "     Diagnoses and all orders for this visit:    1. Adenocarcinoma of rectum (Primary)    2. Gynecologic exam normal  -     Ambulatory Referral to Gynecology    3. Tachycardia  Assessment & Plan:  Improved on toprol   Continue       4. Chronic anticoagulation    5. Monopolar depression  Overview:  lexapro 20 mg: decreased libido   Prior treatment:  Wellbutrin (believes it had effect on libido)      Assessment & Plan:  Feels better on lexapro - continue       Other orders  -     Fluzone >6mos      Octreotide has improved quality of life due to side effect from treatment.          No orders of the defined types were placed in this encounter.            Follow Up {Instructions Charge Capture  Follow-up Communications :23}     Return in about 3 months (around 3/10/2025) for Annual physical.      Patient was given instructions and counseling regarding her condition or for health maintenance advice. Please see specific information pulled into the AVS if appropriate.    Dragon disclaimer:   Much of this encounter note is an electronic transcription/translation of spoken language to printed text. The electronic translation of spoken language may permit erroneous, or at times, nonsensical words or phrases to be inadvertently transcribed; Although I have reviewed the note for such errors, some may still exist.     Additional Patient Counseling:       There are no Patient Instructions on file for this visit.

## 2024-12-17 DIAGNOSIS — C20 ADENOCARCINOMA OF RECTUM: Chronic | ICD-10-CM

## 2024-12-18 ENCOUNTER — INFUSION (OUTPATIENT)
Dept: ONCOLOGY | Facility: HOSPITAL | Age: 44
End: 2024-12-18
Payer: COMMERCIAL

## 2024-12-18 ENCOUNTER — OFFICE VISIT (OUTPATIENT)
Dept: ONCOLOGY | Facility: CLINIC | Age: 44
End: 2024-12-18
Payer: COMMERCIAL

## 2024-12-18 VITALS
SYSTOLIC BLOOD PRESSURE: 100 MMHG | TEMPERATURE: 97.5 F | BODY MASS INDEX: 31.36 KG/M2 | OXYGEN SATURATION: 95 % | WEIGHT: 188.2 LBS | DIASTOLIC BLOOD PRESSURE: 71 MMHG | HEART RATE: 79 BPM | HEIGHT: 65 IN

## 2024-12-18 DIAGNOSIS — K52.1 CHEMOTHERAPY INDUCED DIARRHEA: ICD-10-CM

## 2024-12-18 DIAGNOSIS — C78.00 MALIGNANT NEOPLASM METASTATIC TO LUNG, UNSPECIFIED LATERALITY: ICD-10-CM

## 2024-12-18 DIAGNOSIS — C20 ADENOCARCINOMA OF RECTUM: ICD-10-CM

## 2024-12-18 DIAGNOSIS — Z79.899 HIGH RISK MEDICATION USE: ICD-10-CM

## 2024-12-18 DIAGNOSIS — Z79.899 ENCOUNTER FOR LONG-TERM (CURRENT) USE OF HIGH-RISK MEDICATION: Primary | ICD-10-CM

## 2024-12-18 DIAGNOSIS — Z79.01 CHRONIC ANTICOAGULATION: ICD-10-CM

## 2024-12-18 DIAGNOSIS — G89.3 CANCER ASSOCIATED PAIN: ICD-10-CM

## 2024-12-18 DIAGNOSIS — T45.1X5A CHEMOTHERAPY INDUCED DIARRHEA: ICD-10-CM

## 2024-12-18 DIAGNOSIS — C20 ADENOCARCINOMA OF RECTUM: Primary | ICD-10-CM

## 2024-12-18 DIAGNOSIS — Z79.899 ENCOUNTER FOR LONG-TERM (CURRENT) USE OF HIGH-RISK MEDICATION: ICD-10-CM

## 2024-12-18 PROBLEM — T45.1X5D ADVERSE EFFECT OF ANTINEOPLASTIC AND IMMUNOSUPPRESSIVE DRUGS, SUBSEQUENT ENCOUNTER: Status: ACTIVE | Noted: 2019-09-03

## 2024-12-18 LAB
ALBUMIN SERPL-MCNC: 3.5 G/DL (ref 3.5–5.2)
ALBUMIN/GLOB SERPL: 1.3 G/DL
ALP SERPL-CCNC: 87 U/L (ref 39–117)
ALT SERPL W P-5'-P-CCNC: 16 U/L (ref 1–33)
ANION GAP SERPL CALCULATED.3IONS-SCNC: 10.6 MMOL/L (ref 5–15)
AST SERPL-CCNC: 24 U/L (ref 1–32)
BASOPHILS # BLD AUTO: 0.03 10*3/MM3 (ref 0–0.2)
BASOPHILS NFR BLD AUTO: 0.4 % (ref 0–1.5)
BILIRUB SERPL-MCNC: 0.2 MG/DL (ref 0–1.2)
BILIRUB UR QL STRIP: NEGATIVE
BUN SERPL-MCNC: 8 MG/DL (ref 6–20)
BUN/CREAT SERPL: 12.5 (ref 7–25)
CALCIUM SPEC-SCNC: 8.6 MG/DL (ref 8.6–10.5)
CHLORIDE SERPL-SCNC: 104 MMOL/L (ref 98–107)
CLARITY UR: CLEAR
CO2 SERPL-SCNC: 24.4 MMOL/L (ref 22–29)
COLOR UR: YELLOW
CREAT SERPL-MCNC: 0.64 MG/DL (ref 0.57–1)
DEPRECATED RDW RBC AUTO: 53.8 FL (ref 37–54)
EGFRCR SERPLBLD CKD-EPI 2021: 111.2 ML/MIN/1.73
EOSINOPHIL # BLD AUTO: 0.16 10*3/MM3 (ref 0–0.4)
EOSINOPHIL NFR BLD AUTO: 2.3 % (ref 0.3–6.2)
ERYTHROCYTE [DISTWIDTH] IN BLOOD BY AUTOMATED COUNT: 14.6 % (ref 12.3–15.4)
GLOBULIN UR ELPH-MCNC: 2.8 GM/DL
GLUCOSE SERPL-MCNC: 116 MG/DL (ref 65–99)
GLUCOSE UR STRIP-MCNC: NEGATIVE MG/DL
HCT VFR BLD AUTO: 42.6 % (ref 34–46.6)
HGB BLD-MCNC: 14.2 G/DL (ref 12–15.9)
HGB UR QL STRIP.AUTO: ABNORMAL
IMM GRANULOCYTES # BLD AUTO: 0.02 10*3/MM3 (ref 0–0.05)
IMM GRANULOCYTES NFR BLD AUTO: 0.3 % (ref 0–0.5)
KETONES UR QL STRIP: NEGATIVE
LEUKOCYTE ESTERASE UR QL STRIP.AUTO: ABNORMAL
LYMPHOCYTES # BLD AUTO: 1.57 10*3/MM3 (ref 0.7–3.1)
LYMPHOCYTES NFR BLD AUTO: 22.6 % (ref 19.6–45.3)
MCH RBC QN AUTO: 33.6 PG (ref 26.6–33)
MCHC RBC AUTO-ENTMCNC: 33.3 G/DL (ref 31.5–35.7)
MCV RBC AUTO: 100.7 FL (ref 79–97)
MONOCYTES # BLD AUTO: 0.6 10*3/MM3 (ref 0.1–0.9)
MONOCYTES NFR BLD AUTO: 8.6 % (ref 5–12)
NEUTROPHILS NFR BLD AUTO: 4.58 10*3/MM3 (ref 1.7–7)
NEUTROPHILS NFR BLD AUTO: 65.8 % (ref 42.7–76)
NITRITE UR QL STRIP: NEGATIVE
NRBC BLD AUTO-RTO: 0 /100 WBC (ref 0–0.2)
PH UR STRIP.AUTO: 6.5 [PH] (ref 4.5–8)
PLATELET # BLD AUTO: 187 10*3/MM3 (ref 140–450)
PMV BLD AUTO: 10.3 FL (ref 6–12)
POTASSIUM SERPL-SCNC: 4.4 MMOL/L (ref 3.5–5.2)
PROT SERPL-MCNC: 6.3 G/DL (ref 6–8.5)
PROT UR QL STRIP: NEGATIVE
RBC # BLD AUTO: 4.23 10*6/MM3 (ref 3.77–5.28)
SODIUM SERPL-SCNC: 139 MMOL/L (ref 136–145)
SP GR UR STRIP: 1.01 (ref 1–1.03)
UROBILINOGEN UR QL STRIP: ABNORMAL
WBC NRBC COR # BLD AUTO: 6.96 10*3/MM3 (ref 3.4–10.8)

## 2024-12-18 PROCEDURE — G0498 CHEMO EXTEND IV INFUS W/PUMP: HCPCS

## 2024-12-18 PROCEDURE — 80053 COMPREHEN METABOLIC PANEL: CPT

## 2024-12-18 PROCEDURE — 25010000002 PALONOSETRON PER 25 MCG: Performed by: NURSE PRACTITIONER

## 2024-12-18 PROCEDURE — 25010000002 FLUOROURACIL PER 500 MG: Performed by: NURSE PRACTITIONER

## 2024-12-18 PROCEDURE — 25010000002 FOSAPREPITANT PER 1 MG: Performed by: NURSE PRACTITIONER

## 2024-12-18 PROCEDURE — 96375 TX/PRO/DX INJ NEW DRUG ADDON: CPT

## 2024-12-18 PROCEDURE — 81003 URINALYSIS AUTO W/O SCOPE: CPT

## 2024-12-18 PROCEDURE — 85025 COMPLETE CBC W/AUTO DIFF WBC: CPT

## 2024-12-18 PROCEDURE — 96413 CHEMO IV INFUSION 1 HR: CPT

## 2024-12-18 PROCEDURE — 25010000002 LEUCOVORIN CALCIUM PER 50 MG: Performed by: NURSE PRACTITIONER

## 2024-12-18 PROCEDURE — 25810000003 SODIUM CHLORIDE 0.9 % SOLUTION 250 ML FLEX CONT: Performed by: NURSE PRACTITIONER

## 2024-12-18 PROCEDURE — 96367 TX/PROPH/DG ADDL SEQ IV INF: CPT

## 2024-12-18 PROCEDURE — 25010000002 BEVACIZUMAB PER 10 MG: Performed by: NURSE PRACTITIONER

## 2024-12-18 PROCEDURE — 25010000002 DEXAMETHASONE SODIUM PHOSPHATE 100 MG/10ML SOLUTION: Performed by: NURSE PRACTITIONER

## 2024-12-18 RX ORDER — ENOXAPARIN SODIUM 100 MG/ML
1 INJECTION SUBCUTANEOUS EVERY 12 HOURS SCHEDULED
Qty: 60 ML | Refills: 2 | Status: SHIPPED | OUTPATIENT
Start: 2024-12-18

## 2024-12-18 RX ORDER — SODIUM CHLORIDE 9 MG/ML
20 INJECTION, SOLUTION INTRAVENOUS ONCE
Status: CANCELLED | OUTPATIENT
Start: 2024-12-18

## 2024-12-18 RX ORDER — PALONOSETRON 0.05 MG/ML
0.25 INJECTION, SOLUTION INTRAVENOUS ONCE
Status: CANCELLED | OUTPATIENT
Start: 2024-12-18

## 2024-12-18 RX ORDER — PALONOSETRON 0.05 MG/ML
0.25 INJECTION, SOLUTION INTRAVENOUS ONCE
Status: COMPLETED | OUTPATIENT
Start: 2024-12-18 | End: 2024-12-18

## 2024-12-18 RX ORDER — ONDANSETRON 8 MG/1
TABLET, FILM COATED ORAL
Qty: 60 TABLET | Refills: 1 | Status: SHIPPED | OUTPATIENT
Start: 2024-12-18

## 2024-12-18 RX ADMIN — PALONOSETRON HYDROCHLORIDE 0.25 MG: 0.25 INJECTION INTRAVENOUS at 09:45

## 2024-12-18 RX ADMIN — BEVACIZUMAB 360 MG: 400 INJECTION, SOLUTION INTRAVENOUS at 10:38

## 2024-12-18 RX ADMIN — DEXAMETHASONE SODIUM PHOSPHATE 12 MG: 10 INJECTION, SOLUTION INTRAMUSCULAR; INTRAVENOUS at 09:48

## 2024-12-18 RX ADMIN — FLUOROURACIL 3360 MG: 50 INJECTION, SOLUTION INTRAVENOUS at 11:39

## 2024-12-18 RX ADMIN — LEUCOVORIN CALCIUM 560 MG: 350 INJECTION, POWDER, LYOPHILIZED, FOR SUSPENSION INTRAMUSCULAR; INTRAVENOUS at 11:08

## 2024-12-18 RX ADMIN — FOSAPREPITANT 100 ML: 150 INJECTION, POWDER, LYOPHILIZED, FOR SOLUTION INTRAVENOUS at 10:04

## 2024-12-18 NOTE — PROGRESS NOTES
REASON FOR FOLLOW UP:     Rectal cancer, rectal ultrasound examination in July 2019 reported T3N0 disease without lymphadenopathy. She does have small pulmonary nodule 6-7 mm and 2 mm with indeterminate feature. There is no solid evidence of metastatic disease otherwise. Patient has stage IIA (T3N0M0) disease.  The patient is heterozygous factor V Leiden, was on prophylactic anticoagulation with Lovenox 40 mg daily given her increased risk of thrombosis with MediPort and GI malignancy.   PET scan on 8/8/2019 reported an 8 mm hypermetabolic right upper lobe pulmonary nodule, which is suspicious for metastatic as well.    Patient had a PowerPort placement on the left upper chest by Dr. Joseph on 8/9/2019.  Patient was started on concurrent infusional 5-FU chemoradiation therapy on 8/12/2019, with planned complete surgical resection and further adjuvant chemotherapy with intention to cure the disease.   Patient underwent surgical resection of the primary rectal cancer by Dr. Ye on 12/2/2019.  Pathology evaluation reported residual T3N1 disease stage IIIb.  Diarrhea related to therapy and radiation.   Patient was started cycle 1 day 1 of adjuvant FOLFOX 6 on 1/23/2020.  On 2/5/2020 FOLFOX 6 cycle 2 delayed secondary to neutropenia.  Patient was given 3 days of Granix injection.  After cycle #2 we planned 3 days of Granix with each cycle.   Patient also intolerant of oral iron.  Patient received 2 doses of IV injectafer on 02/05/2020 and 02/12/2020.   02/12/2020 Proceed with cycle #2 of FOLFOX 6 with 3 days of Granix.  On 3/11/2020 cycle 4 postponed secondary to abdominal pain and occasional low-grade fevers.  CT scans ordered.  Cycle #4 resumed after CT scan revealed no evidence of disease.  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 Ye. patient has no fever.  Continue to observe.   Grade 3 hand-foot syndrome secondary to 5-FU after cycle #7  FOLFOX 6 chemotherapy, prompting ER visit.  Also has worsening peripheral neuropathy.   Cycle #8 FOLFOX 6 was given on 5/13/2020, with 50% dose reduction for both 5-FU and oxaliplatin, and also examination of bolus 5-FU.   PET scan examination on 5/21/2020 reported no evidence of metastatic disease in the chest abdomen pelvis.  Stable 6 mm RUL pulmonary nodule.  On 5/27/2020, I discussed with the patient that we can discontinue chemotherapy at this time.   Patient had a surgical procedure for low anterior colon resection, coloanal anastomosis on 8/3/2020.  CT scan for chest abdomen pelvis on 9/9/2020 reported a new 8 mm noncalcified pulmonary nodule in the left lower lobe of the lung.  Otherwise stable right upper lobe 6 mm pulmonary nodule, and no evidence of disease recurrence in the abdomen or pelvis.  PET scan examination on 9/18/2020 reported multiple hypermetabolic small pulmonary nodules/ new pulmonary nodules.   Patient was started on pelvic chemotherapy with FOLFIRI regimen on 10/13/2020.   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 It reported tumor mutation burden 5 Muts/Mb, microsatellite stable, TP53 mutation [id number], and others.   Cycle #5 was without irinotecan, due to peripheral neuropathy.  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  Patient was switched to weight-based Lovenox injection.  Cycle #6 5-FU and irinotecan was delayed by 2 weeks because of the above incident.  Patient had a telemedicine evaluation at that the [facility] [place] in February 2021.  They agreed with our treatment plan for palliative chemotherapy followed by maintenance chemotherapy.    PET scan examination on 4/6/2021 after cycle #12 palliative chemotherapy reported no evidence of hypermetabolic metastatic lesion.   Patient was started on maintenance chemotherapy with 5-FU and  Avastin on 4/13/2021. (Unable to tolerate Xeloda because of a significant hand-foot syndrome).   PET scan on 9/24/2021 obtained after cycle #12 maintenance chemotherapy with 5-FU, leucovorin, Avastin reported no evidence for active disease. We recommended 3 months chemotherapy holiday.  CT scan examination on 3/15/2022 reported slightly disease progression with increase in size of small pulmonary nodule.  We started patient back on maintenance chemotherapy on 3/29/2022 treatment with 5-FU plus leucovorin and Avastin, repeating every 2 weeks.  After 6 more maintenance chemotherapy, CT scan for chest abdomen pelvis was done on 6/21/2022 which reported stable sub-6 mm pulmonary nodules.  Patient had last maintenance chemotherapy on 6/7/2022.   Disease progression, patient was restarted back on chemotherapy with 5-FU leucovorin and bevacizumab 8/21/2024      HISTORY OF PRESENT ILLNESS:  The patient is a 44 y.o. female with the above-mentioned history, who is seen today for evaluation.     The patient returns to the office today, 12/18/2024 in anticipation of 5-FU and Avastin.  With treatment 2 weeks ago, we did reduce the dose of Avastin.  She reports she tolerated treatment very well.  Her bowels remain controlled with octreotide.  She reports her energy is adequate.  She is able to do activities outside of the home. She has no new pain. She has no nausea or vomiting.     Past Medical History:   Diagnosis Date    Abdominopelvic abscess 08/12/2020    ADMITTED TO [facility]    Abnormal Pap smear of cervix 02/02/1998    JULIUS I    Abscess of abdominal wall 11/28/2012    SEEN AT [facility] ER    Anemia in pregnancy     Anxiety     Bilateral epiphora 04/2020    Bilateral hand swelling 05/02/2020    SEEN AT [facility] ER    Cervical lymphadenitis 06/06/2012    SEEN AT [facility] ER    Chemotherapy induced diarrhea 01/2021    Chemotherapy induced neutropenia 04/2020    Chemotherapy-induced nausea 02/2020    Chemotherapy-induced thrombocytopenia 05/2020     Chronic anticoagulation     Chronic diarrhea     Colon polyps     FOLLOWED BY DR. 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 ALEXANDRU HUNT    History of gestational diabetes     History of pre-eclampsia 1998    History of pre-eclampsia     History of radiation therapy 2019    FOLLOWED BY DR. 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    Perirectal abscess 08/12/2020    Pulmonary embolism without acute cor pulmonale 09/20/2019    X 3; ADMITTED TO [facility]    Pulmonary nodule, right 05/2020    Rectal cancer 07/08/2019    STAGE IIA, INVASIVE MODERATELY DIFFERENTIATED ADENOCARCINOMA, CHEMO AND  XRT FINISHED 2019    Right shoulder pain 2020    SEEN AT [facility] ER    Right ureteral stone 10/01/2019    SEEN AT [facility] ER    SAB (spontaneous ) 1996     A2-1 INDUCED    Sciatica     Sepsis due to cellulitis 2002    LEFT GREAT TOE, ADMITTED TO [facility]    Tachycardia 2020    Thrombosis of superior vena cava 2020    AND BRACHIOCEPHALIC VEIN, ADMITTED TO [facility]    Urinary urgency 2020   Patient had COVID-19 infection diagnosed on 2022 despite was fully vaccinated and boosted.  She recovered without problem.      Past Surgical History:   Procedure Laterality Date    ABDOMINAL SURGERY      CHOLECYSTECTOMY N/A 10/10/2003    LAPAROSCOPIC WITH CHOLANGIOGRAM, DR. JAMEY TALAVERA AT [facility]    COLON RESECTION N/A 2019    Procedure: laparoscopic low anterior colon resection with mobilization of splenic flexure and diverting loop ileostomy: ERAS;  Surgeon: Padmaja Esparza MD;  Location: [facility];  Service: General    COLON RESECTION N/A 2020    Procedure: LOW ANTERIOR COLON RESECTION, COLOANAL ANASTOMOSIS, MOBILIZATION SPLENIC FLEXURE;  Surgeon: Padmaja Esparza MD;  Location: [facility];  Service: General;  Laterality: N/A;    COLONOSCOPY N/A 07/15/2020    PATENT ANASTAMOSIS IN MID RECTUM, RESCOPE IN 1 YR, DR. PADMAJA ESPARZA AT [facility]    COLONOSCOPY N/A 2022    DIFFUSE AREA OF MODERATELY FRIABLE MUCOSA IN ENTIRE COLON , DIVERSION COLITIS, PATENT AND HEALTHY ANASTAMOSIS, DR. PADMAJA ESPARZA AT [facility]    COLONOSCOPY N/A 2023    6 MM SESSILE SERRATED ADENOMA POLYP IN DESCENDING, PATENT ANASTAMOSIS IN DISTAL RECTUM, RESCOPE IN 2 YRS, DR. PADMAJA ESPARZA AT [facility]    COLONOSCOPY W/ POLYPECTOMY N/A 2019    15 MM TUBULOVILLOUS ADENOMA POLYP IN HEPATIC FLEXURE, 20 MMTUBULOVILLOUS ADENOMA WITH HIGH GRADE DYSPLASIA IN RECTOSIGMOID, 4 CM MASS IN MID RECTUM, PATH: INVASIVE MODERATELY DIFFERENTIATED ADENOCARCINOMA, DR. JENNIFER LI AT [facility]    DILATATION AND  EVACUATION N/A 2009    ENDOSCOPY N/A 07/08/2019    LA GRADE A ESOPHAGITIS, GASTRITIS, ALL BIOPSIES BENIGN, DR. JENNIFER LI AT [facility]    ILEOSTOMY CLOSURE N/A 11/14/2022    Procedure: ILEOSTOMY TAKEDOWN;  Surgeon: Geronimo Ye MD;  Location: [facility] MAIN OR;  Service: General;  Laterality: N/A;    INCISION AND DRAINAGE PERIRECTAL ABSCESS N/A 08/14/2020    Procedure: INCISION AND DRAINAGE OF retrorectal dehiscence abcess with drain placement and irrigation;  Surgeon: Geronimo Ye MD;  Location: [facility] MAIN OR;  Service: General;  Laterality: N/A;    PAP SMEAR N/A 01/24/2014    SIGMOIDOSCOPY N/A 07/24/2019    INFILTRATIVE PARTIALLY OBSTRUCTING LARGE RECTAL CANCER, AREA TATOOED, DR. GERONIMO YE AT [facility]    SIGMOIDOSCOPY N/A 11/23/2019    AN ULCERATED PARTIALLY OBSTRUCTING MASS IN MID RECTUM, AREA TATTOOED, DR. GERONIMO YE AT [facility]    SIGMOIDOSCOPY N/A 07/22/2021    PATENT ANASTAMOSIS IN DISTAL RECTUM, RESCOPE IN 1 YR, DR. GERONIMO YE AT [facility]    SIGMOIDOSCOPY N/A 09/11/2024    PATCHY INFLAMMATION AND EDEMA IN DESCENDING, AREA BIOPSIED AND WAS BENIGN, PATENT AND HEALTHY ANASTAMOSIS, HEMORRHOIDS,RESCOPE(CY) 12/2025, DR. GERONIMO YE AT [facility]    TRANSRECTAL ULTRASOUND N/A 07/24/2019    Procedure: ULTRASOUND TRANSRECTAL;  Surgeon: Geronimo Ye MD;  Location: [facility] ENDOSCOPY;  Service: General    URETEROSCOPY LASER LITHOTRIPSY WITH STENT INSERTION Right 10/30/2019    DR. ESTUARDO BEASLEY AT [place]    VAGINAL DELIVERY N/A 09/18/1998    VENOUS ACCESS DEVICE (PORT) INSERTION Left 08/09/2019    Procedure: INSERTION VENOUS ACCESS DEVICE;  Surgeon: Sj Joseph MD;  Location: [facility];  Service: General    VENOUS ACCESS DEVICE (PORT) INSERTION N/A 12/18/2020    Procedure: INSERTION VENOUS ACCESS DEVICE right side, removal venous access device left side;  Surgeon: Sj Joseph MD;  [facility];  Service: General;  Laterality: N/A;    WISDOM TOOTH EXTRACTION Bilateral 1993        HEMATOLOGIC/ONCOLOGIC HISTORY:      The patient reports she has intermittent rectal bleeding and urgency, mucous with her stool, starting sometime in 2016. At that time she was referred to GI service, and was diagnosed of irritable bowel syndrome. Nevertheless she had worsening urgency for bowel movement, and had a couple of incidents losing control of stool. She was recently seen by Roland Thorpe MD again and had colonoscopy and EGD exam on 07/08/2019. She was found having a circumferential rectal mass. According to the procedure note, the patient had a fungating circumferential bleeding 4 cm mass in the middle rectum, at distance between 13 cm and 17 cm from the anus. Mass was causing partial obstruction. There were also colon polyps found at the hepatic flexure and also at the rectosigmoid junction 23 cm from the anus. Both were resected and retrieved. EGD examination reported grade A esophagitis at the GE junction and patchy discontinuous erythema and aggravation of the mucosa without bleeding in the stomach body. There is normal mucosa of the duodenum. Pathology evaluation from this procedure reported moderately differentiated adenocarcinoma involving the rectal mass. The rectal sigmoid junction polyp was tubular/tubulovillous adenoma with high grade dysplasia negative for invasive malignancy. The hepatic flexure polyp was also tubular/tubulovillous adenoma negative for high grade dysplasia or malignancy. The biopsy from the esophagus reports squamocolumnar mucosa with inflammatory changes suggestive of mild reflux esophagitis, negative for interstitial metaplasia. Gastric biopsy was benign and duodenal biopsy was also benign. There is no mention of H-pylori.     Patient was subsequently referred to colorectal surgeon Padmaja Ye MD for further evaluation. The patient had CT scan examination for chest, abdomen and pelvis requested by Dr. Ye and were done on 07/13/2019. The study reported no evidence  of lymphadenopathy in the abdomen and pelvis. There was wall thickening of the rectosigmoid junction. Normal spleen, pancreas, adrenal glands and kidneys. There was fatty liver infiltration but no focal lymphatic lesions. There was a small 6-7 mm noncalcified nodule in the right upper lobe and a tiny 3 mm subpleural nodule in the right middle lobe. No mediastinal or hilar lymphadenopathy.     Dr. Ye performed staging rectal ultrasound examination. This study reported tumor penetrating through the muscularis propria as T3 disease; there were no lymph nodes identified.    She had a hospitalization in late September 2019 because of newly discovered pulmonary emboli.  She was on prophylactic Lovenox prior to the incident of PE.  Now she is on full dose Xarelto anticoagulation.  Patient reports no further chest pain dyspnea.  She denies bleeding or bruising.  During her hospitalization, she was seen by Dr. Ye, who plans to have surgery 8 to 12 weeks after finishing radiation therapy.  She finished her radiation on 9/19/2019.     I noticed patient also presented to the emergency room on 10/1/2019 complaining of right flank area pain.  I reviewed the images studies and indeed she has a very small 1 to 2 mm obstructing kidney stone in the UV junction.  Patient is still symptomatic with some pains and dysuria.  She denies fever sweating or chills.    Laboratory study on 10/7/2019 reported normal CBC including hemoglobin 13.1, platelets 301,000, WBC 6170 and ANC 4900 lymphocytes 590 monocytes 480.      The nurse reported malfunction of port-a-catheter on 10/7/2019.  Port study on 10/8/2019 showed fibrin sheath around the distal aspect of the Mediport catheter in the SVC. This does not appear to hinder injection, but does prevent aspiration at this time.    Patient underwent surgical resection of the colon on 12/2/2019 with Dr. Ye.  Pathology evaluation reported residual T3 disease, also 1 out of 14 lymph nodes  positive for malignancy.  So this patient in either had at least stage IIIb disease (T3 N1 M0?).      Adjuvant chemotherapy FOLFOX 6 will be started on 1/22/2020.  Laboratory study reported iron saturation 10%, free iron 31 TIBC 319 and ferritin 168.  Her hemoglobin was 11.8, WBC 5600, and platelets 347,000.    Patient was here on 02/12/2020 for cycle 2 of her FOLFOX.  This is delayed x1 week secondary to neutropenia.  The patient ultimately received 3 days worth of Neupogen with recovery of her blood counts.  Of note, the patient struggled with significant nausea without any episodes of vomiting following cycle #1 of chemotherapy resulting in significant weight loss.  She is up 12 pounds over the last week as her appetite has normalized.  We will add Emend to her care plan.    She is having several loose stools per her colostomy and has been hesitant to take Imodium due to prior history of constipation.  I encouraged her to try this with a maximum of 8 tablets/day.  She denies any infectious symptoms including fevers or chills.  No excess bleeding or bruising noted.  She had the expected cold sensitivity related to the Oxaliplatin for about 3 days following treatment.    Labs from 02/12/2020 demonstrated total white blood cell count of 5.14, ANC of 3.06, hemoglobin of 11.2, platelets of 211,000.  She was found to be iron deficient last week and is intolerant to oral iron secondary to GI distress.  For this reason, she initiated IV iron therapy with Injectafer last week.  She had received her second dose 02/12/2020    Patient has normalized hemoglobin 12.2 on 2/26/2020.    She reported on 5/5/2020 she had a recent visit to the emergency department for what was suspected to be an allergic reaction.  She called our on-call service on Saturday with reports of hand and face swelling.  She was instructed to proceed to the emergency department at which time she was assessed and prescribed a Medrol Dosepak.  She had just  completed her 5-FU and was unhooked on Friday, 5/3/2020.  Her symptoms have improved.  She does report persistent hyperpigmentation and mild swelling of the palms of the hands but this is much improved.  It was her right hand specifically that was swollen.  Her facial swelling has resolved.  She continues on Cefdinir nd since has 1 day remaining, she was prescribed cefdinir for a UTI requiring hospitalization at the end of April.  Reports no new symptoms.  Her labs are stable.  She is scheduled for treatment again.    Patient states at this time she is not tolerating her chemotherapy well.     Patient seen in the emergency department on 5/2/2020 for what was suspected to be an allergic reaction.  She called our on-call service on Saturday explaining that she was experiencing hand and face swelling.  She was instructed to proceed to the emergency department at which time she was assessed and prescribed a Medrol Dosepak.  She had just completed her 5-FU and was unhooked on Friday, 5/1/2020.      She reports since her ED visit on 5/2/2020 her symptoms have not improved. Her hands and feet were swollen upon presenting to the ED and she could barely make a fist. She states that she feels so swollen she is not able to stand it. She states that her skin on the hands and feet are peeling extensively as well, besides changing color to more dark.     She also reports significant fatigue after her first week of the 5-FU treatment but she expected this side effect. She also notices significant increase in her neuropathy to the point that she is not able to even walk around in her home without her house slippers due to irritation from her rugs. She denies and associated nausea or vomiting at this time. She also has episodes of epistaxis every day after the chemotherapy cycle #7.  She does report working full-time during the week of chemotherapy.    Laboratory studies, 5/13/2020, show moderate thrombocytopenia platelets 123,000,  low normal WBC 4140 including ANC 2720 and normal hemoglobin 13.4.  Because significant hand-foot syndrome, will decrease both 5-FU and oxaliplatin by 50%, and eliminate bolus dose of 5-FU.    On 5/21/2020 patient had a PET scan performed which showed no convincing evidence of residual disease in abdomen or pelvis, no metastatic disease within the chest or neck.     Cycle #8 FOLFOX 6 was given on 5/13/2020, with 50% dose reduction for both 5-FU and oxaliplatin, and also examination of bolus 5-FU.  She states on 5/27/2020 that with the recent reduction of the chemotherapy she feels significantly better. She has more energy and is able to do her daily routine.      PET scan examination on 5/21/2020 reported no evidence of metastatic disease in chest abdomen pelvis.  There was a stable 6 mm right upper lobe pulmonary nodule.    Laboratory studies on 5/27/2020 showed mild leukocytopenia WBC 3720 but a normal ANC 2250 and lymphocytes 630.  Normal platelets 163,000 and hemoglobin 12.6.  Chemistry lab reported normal renal function, liver function panel and a electrolytes, elevated glucose 164.    Laboratory studies 6/24/2020, showed normal hemoglobin 13.4 but macrocytosis .9.  Normal platelets 210,000 and WBC 5870.  She had normal CMP.     Patient last time was here in late June 2020.  Since that time she had surgical procedure for low anterior colon resection, coloanal anastomosis on 8/3/2020.  She later developed a perirectal abscess, required surgical drainage on 8/14/2020.  She was discharged on 8/27/2020.    Patient reports to me she still has lower abdominal wall vacuum suction in place.  She denies fever sweating chills.  Performance status is ECOG 1.  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  She does have visiting nurse come to the home for wound care.    CT scan for chest abdomen pelvis on 9/9/2020 reported a new 8 mm noncalcified pulmonary nodule in the left lower lobe.   Otherwise stable right upper lobe 6 mm pulmonary nodule, and no evidence of disease recurrence in the abdomen or pelvis.     Laboratory study on 9/16/2020 reported elevated AST 55, ALT 95, alk phosphatase 143 but normal total bilirubin 0.3.  Chemistry lab otherwise unremarkable.  She has completed normal CBC.      Because of the abnormal CT scan examination for chest abdomen pelvis on 9/9/2020 reported a new 8 mm noncalcified pulmonary nodule in the left lower lobe, we obtained a PET scan examination for further evaluation, which was done on 9/18/2020.  This study reported several pulmonary nodules, some of them are hypermetabolic, newly developed compared to previous PET scan in May 2020.  Certainly does highly suspicious for metastatic disease.  There are also hypermetabolic activity in the abdomen and pelvic area which is hard to differentiate from surgical scar versus metastatic malignancy.    Laboratory study on 10/13/2020 reported normal CBC with Hb 13.9, platelets 302,000 and WBC 5520 including ANC 3830.  Chemistry lab reported normalized AST 20, alk phosphatase 116 improved ALT 35, and maintains normal bilirubin, with normal electrolytes and liver function panel.     Patient was started on first cycle of palliative chemotherapy FOLFIRI on 10/13/2020.    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  Patient had a new port placed 12/18/2020, and had held her Xarelto for 2 days prior to surgery.  She presented to the ER on 12/21/20 with complaints of facial and arm swelling for 3 days.  She also noted increased shortness of breath.  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  Thrombus within the right internal jugular vein cannot be excluded.  Patient was started on IV heparin, and eventually transitioned to weight-based  Lovenox, which she now continues.    PET scan examination on 9/24/2021 reported further shrinking of the tiny right upper lobe pulmonary nodule.  Otherwise no new lesions.  No evidence for metastatic disease in other areas.      Patient had CT scan for chest with IV contrast obtained on 12/14/2021 which reported small tiny stable pulmonary nodules. There is a 4 mm right upper lobe pulmonary nodule. There is also a stable 4 mm left lower lobe pulmonary nodule. There is also stable left upper lobe micronodule.     Laboratory studies today on 12/21/2021 reported normal hemoglobin 15.9 however .0, platelets 218,000 WBC 5360 including neutrophils 3730 lymphocytes 980. Chemistry lab reported normal renal function, electrolytes, glucose, and marginally elevated ALT 55, AST 34 but normal total bilirubin and alk phosphatase.     CT scan for chest abdomen pelvis 6/21/2022 reported sub-6 mm pulmonary nodules either stable or slightly smaller.  No new pulmonary nodules or evidence for disease progression.  There is no evidence for metastatic disease in the liver however it shows diffuse hepatic steatosis.  There was masslike thickening in the presacral space with the clips or calcification approximately 1.7 cm in greatest AP dimension but stable.    Laboratory study on 9/20/2022 reported normal hemoglobin 15.7 with mild macrocytosis .1.  She has normal CBC and platelets.  She also has unremarkable CMP.  Normal CEA 1.13 ng/mL.    Patient was seen by Dr. Ye, who performed ileostomy takedown on 11/14/2022.  Patient reports that she is recovering.  She still has a small open wound less than 1 cm in diameter, however close to 2 cm deep.  She has been changing dressing herself.  She denies fever sweating chills.    Patient has no other specific complaints.  She has excellent performance status ECOG 0.  She denies chest pain dyspnea cough hemoptysis.  No abdominal pain.  No melena hematochezia.  Patient has been  eating well, stable weight.  She works full-time.    She continues Lovenox injections and denies any significant bleeding issues.   No other new problems or concerns.     CT scan for chest abdomen pelvis obtained on 1/10/2023 reported stable small pulmonary nodules, no evidence for active or new disease.    Laboratory study on 1/10/2023 reported normal CBC and normal CMP.  CEA level is 0.99 ng/mL.    CT scan for chest, abdomen, and pelvis on 7/7/2023 reported stable small pulmonary nodules including a left upper lobe 5 mm nodule. There were no new masses, or pleural effusion. No enlarged supraclavicular, axillary, mediastinal, or hilar lymphadenopathy. Mediastinal vasculature normal. There is occlusion of the superior vena around the catheter with body wall  is present. There was no evidence for disease recurrence in the abdomen or pelvis. Bone is also negative for metastatic disease.    Laboratory studies obtained on 07/07/2023 also reported normal CBC, and normal CMP as well as low level CEA 1.07 ng/mL.    The CT scan for the chest on 10/27/2023 reported enlarging pulmonary nodules bilaterally. The right upper lobe lung nodule increased from 7 x 7 mm to 9 x 8 mm, and the left upper lobe nodule measured 8 x 9 mm from 5 x 6 mm in 04/2023. There is a different left upper lobe nodule 6 x 8 mm from previous 5 x 5 mm. The presacral tissue thickness measures up to 2.3 cm.    A laboratory study on 10/27/2023 reported CEA 1.58 ng/mL, normal CBC, and CMP.  Molecular study of peripheral blood Guardant Reveal is still pending.    The patient presents today on 11/17/2023 for reevaluation to discuss the results of PET scan examination as well as the results of tumor conference. I saw her recently on 11/03/2023 and because of enlarging pulmonary nodules on the CT scan, we requested a PET scan examination. I presented her case yesterday on 11/16/2023 at the [facility].    The patient reports she is at  her baseline condition as 2 weeks ago. No specific complaints, no chest pain, dyspnea, cough, etc. She has a regular bowel movement.    Her case was present at the [facility]. on 11/16/2023. The PET scan images and chest CT scan were reviewed in conjunction with her treatment history. The consensus was for patient to receive stereotactic radiation therapy for the right upper lobe lung lesion, and the bigger left upper lobe lesion, and monitor the smaller left upper lobe lesion with further images studies down the line. Also, the conference recommended Guardant Reveal study which we already ordered.     This Guardant Reveal study came back today as negative for ctDNA 0%.        CT of the chest on 2/2/2024 reported shrinking of the right upper lobe lesion from 9 mm to 6 mm, and the left upper lobe lesion also decreased from 9 mm to 6 mm. Otherwise, there are a couple of stable pulmonary nodules, 7 to 8 mm. The right hilar has a small lymph node that has increased from 6 mm to 8 mm and is otherwise stable. There was no suspicious lesion in the abdomen or pelvis.    Colonoscopy examination 9/11/2024 showed patchy mild inflammation characterized by congestion/edema in the descending colon. Evidence of previous endo coloanal anastomosis in the rectum. Presence of hemorrhoids.      MEDICATIONS    Current Outpatient Medications:     bismuth subsalicylate (PEPTO BISMOL) 262 MG/15ML suspension, Take 15 mL by mouth 4 (Four) Times a Day., Disp: , Rfl:     clonazePAM (KlonoPIN) 1 MG tablet, Take 1 tablet as needed daily for anxiety. May take one additional as needed for severe anxiety., Disp: 45 tablet, Rfl: 3    Cyanocobalamin (VITAMIN B-12 PO), Take 1 tablet by mouth 3 (Three) Times a Week. M-W-F, Disp: , Rfl:     dicyclomine (BENTYL) 20 MG tablet, TAKE 1 TABLET BY MOUTH EVERY 6 (SIX) HOURS AS NEEDED FOR IRRITABLE BOWEL SYMPTOMS (Patient taking differently: Take 1 tablet by mouth Every Evening.), Disp: 360  tablet, Rfl: 2    diphenoxylate-atropine (LOMOTIL) 2.5-0.025 MG per tablet, TAKE 2 TABLETS BY MOUTH 4 TIMES A DAY, Disp: 240 tablet, Rfl: 11    Enoxaparin Sodium (LOVENOX) 100 MG/ML solution prefilled syringe syringe, INJECT 1 ML UNDER THE SKIN INTO THE APPROPRIATE AREA AS DIRECTED EVERY 12 (TWELVE) HOURS., Disp: 60 mL, Rfl: 2    escitalopram (LEXAPRO) 10 MG tablet, Take 1 tablet by mouth Daily., Disp: 90 tablet, Rfl: 1    famotidine (PEPCID) 20 MG tablet, TAKE 1 TABLET BY MOUTH TWICE A DAY (Patient taking differently: Take 1 tablet by mouth Every Evening.), Disp: 180 tablet, Rfl: 2    HYDROcodone-acetaminophen (NORCO) 5-325 MG per tablet, Take 1 tablet by mouth Every 6 (Six) Hours As Needed for Moderate Pain., Disp: 60 tablet, Rfl: 0    hydrocortisone 2.5 % cream, APPLY RECTALLY 3 TIMES DAILY. INCLUDE APPLICATOR., Disp: , Rfl:     Hydrocortisone, Perianal, (Anusol-HC) 2.5 % rectal cream, Apply rectally 3 times daily.  Include applicator., Disp: 30 g, Rfl: 1    Loperamide HCl (IMODIUM PO), Take  by mouth As Needed., Disp: , Rfl:     Loratadine 10 MG capsule, Take 1 capsule by mouth Every Evening., Disp: , Rfl:     metoprolol succinate XL (TOPROL-XL) 25 MG 24 hr tablet, TAKE 0.5 TABLETS BY MOUTH EVERY NIGHT, Disp: 45 tablet, Rfl: 2    nystatin (MYCOSTATIN) 901020 UNIT/GM cream, Apply 1 Application topically to the appropriate area as directed 2 (Two) Times a Day., Disp: 30 g, Rfl: 2    nystatin-hydrocortisone-bacitracin in zinc oxide ointment, Apply  topically to the appropriate area as directed Daily., Disp: 60 g, Rfl: 2    octreotide (sandoSTATIN) 100 MCG/ML injection, Inject 1 mL under the skin into the appropriate area as directed 3 (Three) Times a Day., Disp: 90 mL, Rfl: 2    ondansetron (ZOFRAN) 8 MG tablet, TAKE 1 TABLET BY MOUTH THREE TIMES A DAY AS NEEDED FOR NAUSEA AND VOMITING, Disp: 60 tablet, Rfl: 1    pantoprazole (Protonix) 40 MG EC tablet, Take 1 tablet by mouth Daily., Disp: 90 tablet, Rfl: 1     "rosuvastatin (CRESTOR) 5 MG tablet, TAKE 1 TABLET BY MOUTH EVERY DAY, Disp: 90 tablet, Rfl: 0    Syringe/Needle, Disp, 27G X 1/2\" 1 ML misc, Use as directed for octreotide injections, Disp: 100 each, Rfl: 3  No current facility-administered medications for this visit.    Facility-Administered Medications Ordered in Other Visits:     fluorouracil (ADRUCIL) 3,360 mg in sodium chloride 0.9 % 240 mL chemo infusion - FOR HOME USE, 1,680 mg/m2 (Treatment Plan Recorded), Intravenous, Once, Patience Lorenzo APRN, 3,360 mg at 12/18/24 1139    ALLERGIES:   No Known Allergies    SOCIAL HISTORY:       Social History     Tobacco Use    Smoking status: Every Day     Current packs/day: 1.00     Average packs/day: 1 pack/day for 25.0 years (25.0 ttl pk-yrs)     Types: Cigarettes     Passive exposure: Current    Smokeless tobacco: Never    Tobacco comments:     1 PPD/caffeine use    Vaping Use    Vaping status: Some Days    Substances: Nicotine    Devices: Disposable    Passive vaping exposure: Yes   Substance Use Topics    Alcohol use: Not Currently     Comment: RARELY    Drug use: Never         FAMILY HISTORY:   Mother has positive factor V Leiden mutation, although she did not have thrombosis, mother also is coronary disease with stenting, she also is occasional bruising.    Maternal grandmother had DVT, she had multiple surgical procedures.    Patient mother's half-brother had metastatic colon cancer at diagnosis in his 50s.    Maternal great aunt had breast cancer.           Vitals:    12/18/24 0910   BP: 100/71   Pulse: 79   Temp: 97.5 °F (36.4 °C)   TempSrc: Oral   SpO2: 95%   Weight: 85.4 kg (188 lb 3.2 oz)   Height: 165.1 cm (65\")   PainSc: 0-No pain         12/18/2024     9:10 AM   Current Status   ECOG score 0     Physical Exam  Vitals reviewed.   Constitutional:       Appearance: Normal appearance. She is well-developed.   HENT:      Head: Normocephalic and atraumatic.      Comments:         Nose: Nose normal. "   Eyes:      Conjunctiva/sclera: Conjunctivae normal.   Cardiovascular:      Rate and Rhythm: Normal rate and regular rhythm.   Pulmonary:      Effort: Pulmonary effort is normal.      Breath sounds: Normal breath sounds.   Abdominal:      General: Bowel sounds are normal.      Palpations: Abdomen is soft. There is no mass.      Tenderness: There is no abdominal tenderness.   Musculoskeletal:      Right lower leg: No edema.      Left lower leg: No edema.   Skin:     Findings: No bruising or rash.   Neurological:      Mental Status: She is alert. Mental status is at baseline.   Psychiatric:         Mood and Affect: Mood normal. Affect is not tearful.       RECENT LABS:  Results from last 7 days   Lab Units 12/18/24  0833   WBC 10*3/mm3 6.96   NEUTROS ABS 10*3/mm3 4.58   HEMOGLOBIN g/dL 14.2   HEMATOCRIT % 42.6   PLATELETS 10*3/mm3 187     Results from last 7 days   Lab Units 12/18/24  0833   SODIUM mmol/L 139   POTASSIUM mmol/L 4.4   CHLORIDE mmol/L 104   CO2 mmol/L 24.4   BUN mg/dL 8   CREATININE mg/dL 0.64   CALCIUM mg/dL 8.6   ALBUMIN g/dL 3.5   BILIRUBIN mg/dL 0.2   ALK PHOS U/L 87   ALT (SGPT) U/L 16   AST (SGOT) U/L 24   GLUCOSE mg/dL 116*     Urinalysis without microscopic (no culture) - Urine, Clean Catch (12/18/2024 08:33)         IMAGING:  NM PET/CT Skull Base to Mid Thigh (08/14/2024 09:17)     NM PET/CT Skull Base to Mid Thigh  F-18 FDG PET SKULL BASE TO MID THIGH WITH PET CT FUSION.     HISTORY: 44-year-old female with metastatic rectal cancer. Restaging.     TECHNIQUE: Radiation dose reduction techniques were utilized, including  automated exposure control and exposure modulation based on body size.   Blood glucose level at time of injection was 72 mg/dL. 5.8 mCi of F-18  FDG were injected and PET was performed from skull base to mid thigh. CT  was obtained for localization and attenuation correction. Time at  injection 1:38 p.m. PET start time 2:53 p.m. Normalization method:  patient weight. Compared  with chest CTA 11/02/2024 and PET/CT  08/14/2024.     FINDINGS: Blood pool has a 2.4 max SUV, previously 2.5.  1. There is a new 2.8 x 1.3 cm ground-glass opacity at the lateral  aspect of the left apex with a 6.3 max SUV. The appearance is  nonspecific and this may represent an infectious or inflammatory  infiltrate. Reevaluation is recommended with a chest CT in 3 months.     Otherwise, the remaining pulmonary nodules are smaller than on the  previous PET/CT and are no longer hypermetabolic. A 6 mm pulmonary  nodule at the superior segment of the left lower lobe has a 0.8 max SUV,  previously 0.8 cm with 2.4 max SUV.     2. There is new hypermetabolic activity at a new healing fracture at the  anterior aspect of the right 2nd rib with a 5.7 max SUV.     3. There is no suspicious visceral activity within the abdomen or pelvis  and there is no hypermetabolic lymphadenopathy.     4. There is no suspicious hypermetabolic activity at the neck.           This report was finalized on 11/14/2024 1:27 PM by Dr. Caroline Solano M.D  on Workstation: [id number]       ASSESSMENT:   1.  Metastatic rectal cancer.   Initial rectal ultrasound examination reported T3N0 disease without lymphadenopathy.   CT scan of chest, abdomen and pelvis reported no lymphadenopathy in the abdomen, pelvis or chest. She does have small pulmonary nodule 6-7 mm and 2 mm with indeterminate feature. There is no solid evidence of metastatic disease otherwise.   Based on the CT scan and rectal ultrasound imaging studies, this patient had stage IIA (T3N0M0) disease.   She had PET scan examination on 8/8/2019 which reported a hypermetabolic right upper lobe pulmonary nodule 6 mm with SUV 5.6.  This is suspicious for metastatic disease however it is too small to be biopsied.  This patient may have stage IV disease.   She initiated concurrent radiation with continuous 5-FU on 8/12/2019.  Patient finished concurrent chemoradiation therapy.  Patient underwent  surgical resection of the rectal tumor and diverting loop ileostomy on 12/2/2019 with Dr. Ye.  Pathology evaluation reported residual T3 disease, with 1 out of 14 lymph nodes positive for malignancy.  Certainly this patient has at least stage IIIb rectal cancer (T3N1M0?)  On 1/22/2020 adjuvant chemotherapy FOLFOX 6 regimen initiated.   On 2/5/2022 cycle #2 was delayed secondary to neutropenia.  She was given 3 days of Granix.   Emend added with cycle 3.  With improved nausea control  Continuing home Granix x3 days following 5-FU disconnect  3/11/2020 due for cycle 4, however, she is experiencing progressive abdominal pain and occasional fevers.   CT scan performed on 3/13/2020 reveals no evidence of progressive or recurrent disease.  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  Patient hospitalized 4/24-4/26/20 after cycle 5 of chemotherapy with acute UTI.  CT abdomen/pelvis noting fluid collection in the presacral region having diminished in size compared to CT dated 3/13/2020.  Patient was evaluated by Dr. Ye while in the hospital with further plans to evaluate possible colovaginal fistula following completion of chemotherapy.  Patient did respond to IV antibiotics and discharged home on oral cefdinir.  4/29/2020 cycle 6 of FOLFOX.  Urinary symptoms have resolved   Patient seen in the [facility] ED on 5/2/2020 for what was suspected to be an allergic reaction.  She called our service on Saturday explaining that she was experiencing hand and face swelling.  She was instructed to proceed to the emergency department at which time she was assessed and prescribed a Medrol Dosepak.  She had just completed her 5-FU and was unhooked on Friday, 5/1/2020.  Her symptoms have resolved in the office on assessment, 5/5/2020.  The patient had grade 3 hand-foot syndrome based on symptomology.  Patient had cycle #8 of 5-FU on 5/13/2020. Due to her symptoms and poor tolerance to the 5-FU, her treatment dose will be reduced 50%  for oxaliplatin and infusional 5-FU.  Bolus 5-FU will be discontinued..  On 5/21/2020 patient had a PET scan and it showed no evidence of of metastatic disease in the neck, chest, abdomen or pelvis.  There was fluid accumulation/abscess.   On 5/27/2020 discussed with the patient that we can discontinue chemotherapy at this time.  She will follow-up with Dr. Ye to discuss a possibility to reverse the ileostomy.  We likely will obtain anther PET scan in 3 to 4 months for reassessment.    Patient was seen by Dr. Ye on 6/19/2019 for evaluation and to discuss possible take down of her ileostomy.  She is scheduled to have a gastrografin enema on 7/2/2020 to evaluate for a fistula, and then a colonoscopy on 7/15/2020, both done by Dr. Ye.  She states that based on the above imaging and procedure findings, she may have a more extensive surgery done or just have her ileostomy reversed, which would likely be done in August 2020.  This will all be coordinated under the care of Dr. Ye.     CT scan from 09/09/2020 and also compared to her previous PET scan examination from 05/21/2020 and also the original PET scan from 08/09/2019.  The original PET scan there is a small right upper lobe pulmonary nodule 6 mm with SUV 5.6. So in the 05/2020 PET scan the nodule is still there but activity seems much less and this most recent CT scan examination also documented the preexisting small pulmonary nodule however there is a new left lower lobe pulmonary nodule 8 mm in size and I shared with the patient today this nodule is suspicious for metastatic disease. Laboratory studies reported minimal CEA level 1.32 on 09/09/2020. Liver function panel was only marginally abnormal with the ALT 45, the remaining is normal. However laboratory study today showed worsening AST 55, ALT 95 and alkaline phosphatase 143 but is still normal, total bilirubin 0.3.   Recommended to have repeat PET scan examination for assessment because the left  lower lobe pulmonary nodule is too small to be biopsied, and if the PET scan reports hypermetabolic lesion, I recommended to have stereotactic radiation therapy. Explained to patient that she is not a really good candidate to have thoracotomy for resection because she still has unhealed wound in the abdomen. Stereotactic radiation therapy will likely be a more feasible choice.   PET scan examination obtained on 9/18/2020 showed metastatic disease.  It reported a few hypermetabolic pulmonary nodules, and some new small pulmonary nodules, all of them highly suspicious for metastatic disease.  She also has hypermetabolic activity in the abdomen and pelvic area which could be related to scar tissue from her recent surgery versus metastatic disease.  Nevertheless overall picture fits with metastatic cancer.  Discussed with the patient on 9/20/2020, we reviewed the PET scan images together, and I recommended to have systematic chemotherapy, I do not think stereotactic reading therapy to the hypermetabolic lesions in the lungs warranted at this time because even those will be treated with reading therapy, she will still need systematic chemotherapy.  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  I did discuss with the patient using anti-EGFR monoclonal antibody versus anti-VEGF monoclonal antibody.     Palliative chemotherapy cycle#1 FOLFIRI was started on 10/13/2020.  No bolus 5-FU given considering previous poor tolerance.    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  Microsatellite stable, mutation burden 5/Mb.   Discussed with the patient 10/27/2020, because of the K-s[place]ia mutation, she is not a candidate for anti-EGFR monoclonal antibody such as Erbitux or vectibix.  She could be a candidate for anti-VEGF monoclonal antibody, however because of active wound, she is not a candidate right now at this moment.  Patient seen in the ED for acute right neck pain  on 11/16/2020.  CT angiogram as well as CT of the cervical spine performed with no acute findings but notably one of her pulmonary nodules, left upper lobe, somewhat decreased in size.  Patient given prednisone pack.  Further details below.  Cycle #6 chemotherapy will be resumed on 1/5/2021.  Started back on original irinotecan.  PET scan examination obtained on 1/12/2021 after cycle #6 chemotherapy reported improved pulmonary nodule hypermetabolic activity.  Confirmed these are metastatic lesions.  Patient is evaluated 1/19/2020, will continue cycle #7 FOLFIRI chemotherapy.  However Avastin will be on hold see next section.   Patient was reevaluated on 2/2/2021, will continue chemotherapy cycle #8 FOLFIRI.  Avastin / biosimilar will be added.    She talked to [facility] specialist in mid February 2021, and had recommended palliative chemotherapy without surgical intervention of metastatic lung lesions.   On 3/2/2021, patient will proceed with cycle #10 FOLFIRI plus bevacizumab.  After 12 cycles, plan to start maintenance treatment.  3/16/2021 cycle 11.  Plus bevacizumab.  3/30/2021 cycle 12 FOLFIRI plus bevacizumab.  Having issues with worsening nausea despite premeds with dexamethasone, Aloxi, Emend, and Zofran at home.  Patient is requesting a dose of Phenergan, which is helped her in the past.  We will give this to her with treatment today.  PET scan examination on 4/6/2021 reported no evidence of hypermetabolic metastatic lesion.  Discussed with the patient on 4/13/2021, we reviewed the PET scan results.  We recommended to switch to maintenance chemotherapy without irinotecan.  We will continue 5-FU and Avastin every 2 weeks.  We discussed possibility of switching to oral Xeloda treatment.  Discussed with the patient for side effects more so with hand-foot syndrome.  Patient is agreeable.   Xeloda 2000 mg twice daily 7 days on, 7 days off along with Avastin initiated  4/27/2021.  After only 5 doses of Xeloda significant hand-foot syndrome, Xeloda held. Patient requesting to be transitioned back to infusional 5-FU, as she felt that she tolerated infusional 5-FU without any problem.  The patient will receive 5-FU leucovorin and Avastin every 2 weeks.  Xeloda discontinued.  5/25/2021 continued cycle #4 maintenance 5-FU, leucovorin, Avastin tolerating this much better so far, we will continue this. Recommended to have 12 cycles of maintenance chemotherapy, then obtain PET scan for reevaluation.  We could discuss further treatment plan at that time.  9/14/2021 patient due for cycle 12 of additional maintenance 5-FU/leucovorin plus Avastin.  She continues to tolerate treatment well overall.  She is anxious in the office today with her Mediport not getting blood at first and also with upcoming scans being heavy on her mind.  She was instructed to take one of her Klonopin pills while here to help calm her mind.  Heart rate did improve from 140s down to 112.  Proceed with treatment today as scheduled.  PET scan examination on 9/24/2021 reported further shrinking of the right upper lobe tiny pulmonary nodule.  No other new lesions.  Discussed with patient on 9/24/2021 about further shrinking of the right upper lobe pulmonary nodule and no evidence for other new lesions. We recommended to have chemo break for 3 months with repeated CT scan for reevaluation.  Recent CT scan for chest 12/14/2021 and abdomen CT from 11/29/2021 reported no evidence for active disease. The right upper lobe pulmonary nodule, left lower lobe pulmonary nodule minimal about 4 mm and stable. I discussed with patient today on 12/21/2021, recommended to give her another 3 months chemotherapy holiday and obtain CT scan afterwards for reevaluation. After discussion, patient is agreeable.   CT scan examination for chest abdomen and pelvis obtained on 3/15/2022 reported a slight increase of the left upper lobe pulmonary  nodule 5 mm, from previous 3 mm.  The other pulmonary nodules were stable in size.  There is also small left upper lobe groundglass changes.   Dr. Nguyen reviewed images studies with the patient 3/23/2022, compared to multiple previous images including CT chest CT and PET scan, suspected disease progression.  Discussed with the patient, and I recommended to resume maintenance chemotherapy with 5-FU plus leucovorin plus Avastin repeating every 2 weeks, and obtaining CT scan for reassessment in 3 months.  Patient is agreeable.  Patient resumed maintenance chemotherapy on 3/29/2022 with 5-FU leucovorin and Avastin.   4/26/2022, proceed with cycle #27 maintenance 5-FU, leucovorin, and Avastin.  Patient continues to tolerate treatment well.    On 5/10/2022, we will proceed ahead with cycle #28 maintenance chemotherapy.  Plan to have CT scan after cycle #30.  5/24/2022 cycle 29 maintenance chemotherapy.  Continue to tolerate well.  6/7/2022 cycle #30 maintenance chemotherapy, continuing to tolerate well.   CT scan for chest abdomen pelvis with IV contrast obtained on 6/21/2022 reported sub-6 mm pulmonary nodules either stable or slightly smaller.  We reviewed the images with the patient together.  We discussed with the patient and recommended to hold chemotherapy for now with repeating CT scan in 3 months for reassessment.  CT scan of the chest abdomen pelvis 9/13/2022 reported stable condition, no evidence for recurrent or metastatic colon cancer.  On 1/10/2023 patient had a CT chest abdomen pelvis with reported no evidence for disease progression.  Laboratory study also showed normal CEA.   Patient had a CT scan for chest, abdomen, and pelvis obtained on 04/11/2023. This study reported very small pulmonary nodules, the right upper lobe nodule is stable to 6 mm, however, the left upper lobe and the left lower lobe nodule increased to 5 mm from previous 4 mm. I discussed with the patient today on 04/19/2023, I recommend to  obtain another CT scan in 3 months for reassessment. The nodules are so small, they likely will not be picked up by PET scan examination.  CT scan examination of the chest, abdomen, and pelvis obtained on 7/7/2023 reported stable small pulmonary nodules. No evidence for disease progression or new lesions in chest, abdomen, and pelvis.  Discussed with patient today on 7/14/2023, however, patient is concerning for disease progression. I recommended to obtain Guardant Reveal for circulating tumor DNA study. If the study is positive, we will further obtain PET scan examination, otherwise, we will obtain CT scan for chest, abdomen, and pelvis in 3 months for reassessment.  The patient had CT scan for the chest, abdomen, and pelvis obtained on 10/27/2023, which reported worsening pulmonary nodules at bilateral upper lobes. Laboratory studies showed low normal CEA level and normal liver function panel. The Guardant Reveal study is still pending. I discussed this with the patient on 11/03/2023 and we shared the images, and I recommended having a PET scan examination for further assessment. I will present her case at the multimodality lung conference. If those lesions are deemed to be metastatic lesions, I recommend having stereotactic radiotherapy. I discussed it with the patient, and she voiced understanding and was agreeable with that strategy.  I presented her case at the multimodality chest conference 11/16/2023.  The consensus was for patient to receive stereotactic radiation therapy for the right upper lobe lung lesion, and the bigger left upper lobe lesion, and monitor the smaller left upper lobe lesion with further images studies down the line. Also, the conference recommended Guardant Reveal study which we already ordered. I discussed with the patient today on 11/17/2023, we explained to the patient that the opinion of the conference and she is agreeable with this and prefers this strategy because of limited  toxicity compared to long term commitment to starting chemotherapy again. I recommend to obtain a CT scan for the chest, abdomen, and pelvis with both IV and oral contrast for reassessment in 3 months for reassessment of metastatic lung lesions and thickness in the pelvis where the PET scan reported a low activity SUV 2.4 in the surgical bed.   The patient had steroid-induced radiation therapy for the right upper lobe and one of the left upper lobe lesions.  Her CT scan examination on 02/02/2024 reported shrinking nodules of the right upper lobe and one of the left upper lobe lesions, both from 9 mm down to 6 mm. There are 2 stable pulmonary nodules 7 mm. Nothing new.  02/09/2024, I discussed with the patient and recommended CT scan for the chest, abdomen, and pelvis in 3 months for reassessment. Guardant Reveal study was 0% ctDNA. We will also continue laboratory studies every 3 months for Guardant Reveal, together with routine labs, CBC, CMP, and CEA.  CT scan of the chest, abdomen, and pelvis conducted on 4/23/2024 revealed stable multiple pulmonary nodules, with no other new pulmonary nodules or evidence of disease recurrence or abnormal pelvis. The patient's liver function panel was normal, and low-level CEA level was also noted. The Guardant Reveal study was able to be obtained earlier due to regulatory rules, and we hugo obtain today the Guardant reveal study, be available within 10 to 14 days.   Given the patient's metastatic liver lesion, and lung lesions from colon cancer, careful monitoring is necessary. A chest CT scan with IV contrast will be arranged in 3 months for reevaluation. The study will be reviewed again in 3 months, which will include CBC, CMP, and CEA level.   Imaging study with chest CT scan on 08/02/2024 reported multiple bilateral pulmonary nodules that are relatively stable. However, the Guardant Reveal reported positive ctDNA indicating disease progression. A subsequent PET scan  examination on 08/14/2024 reported newly developed hypermetabolic pulmonary nodules. The highest SUV is 3.7, corresponding to an 8 mm new right upper lobe nodule, and there are several other hypometabolic nodules in the lungs.   8/21/2024 resumption of chemotherapy with 5-FU bevacizumab  Triage visit 8/28/2024 with intractable diarrhea that was unclear if this is secondary to chemotherapy or infectious etiology given her known chronic colitis, fever and abdominal pain.  Further management as outlined below  9/4/2024 per review today diarrhea has improved though she is having some difficulty with passage of gas and stool,?  Related to significant inflammation in the rectum versus tumor obstruction.  The passage of gas has improved in the last few days but she does still have to change positions on the toilet to get stool to pass without it being painful.  Discussed all of this with Dr. Nguyen and we will refer the patient urgently back to Dr. Ye for at the very least rectal examination in the office versus full repeated colonoscopy.  Because of the potential for examination or invasive procedures, we will hold bevacizumab today.  Because the patient had significant diarrhea last week and concerns that it may be related to chemotherapy we will decrease her 5-FU/leucovorin today by 30%.  If she does well we can go back to full dose with cycle 3.     9/18/2024 she presented for evaluation prior to cycle #3 of palliative chemotherapy with 5-FU, leucovorin, and bevacizumab. Given her recent biopsy on 09/11/2024, it is prudent to postpone the Avastin treatment today to ensure safety.  Chemotherapy will be proceeded.      She presented for evaluation on 10/02/2024, tolerating chemotherapy except for diarrhea. This has been managed with octreotide. Proceed with cycle 4 chemotherapy today with 5-FU, leucovorin and resumption of bevacizumab.   Reevaluation 10/16/2024 due for cycle 5 5-FU, leucovorin, bevacizumab.  Due to  ongoing significant diarrhea, bevacizumab will be placed on hold for potential surgical intervention.  Additionally, 5-FU will be dose reduced to 50%.  Additional adjustments as outlined below  Patient reviewed 10/30/2024 with improved tolerance to cycle five 5-FU, leucovorin.  We we will attempt to slightly escalate 5-FU dosing, increasing by 10% (40% dose reduction)  Patient did experience leakage of 5-FU, and return 10/31/2024 for evaluation of this.  She was sent for port dye study that showed correct location of her port, so 5-FU was resumed.  The patient's right rib pain is likely due to a new chair on the bone, as indicated by the PET scan. The PET scan revealed a new 2.8 x 1.3 cm groundglass opacity in the lateral aspect of the left apex with SUV 6.3. This appearance is nonspecific and may represent an infectious or inflammatory infiltrate. Continued monitoring and follow-up imaging are recommended.  The current regimen of 5-FU will be maintained, and Avastin will be reintroduced with full dose. 11/13/2024 will proceed ahead with 5-FU, leucovorin and bevacizumab.   12/4/2024 She also reports two bad days of diarrhea, likely due to Avastin. The dosage of Avastin will be reduced to 4 mg/kg. The 5-FU dosage will be increased to 1680 mg/m².   12/18/2024 proceed with 5-FU and Avastin continuing previous dose reductions as she had excellent tolerance to treatment 2 weeks ago    2.  Intractable diarrhea due to chemotherapy.   Patient developed diarrhea on Saturday, 8/24/2024.  Is unclear if this is related to infection as she did have fevers at the time the diarrhea began or chemotherapy.  She does have chronic colitis noted on most recent PET scan, this therefore could be diverticulitis flare  GI panel and C. difficile negative  8/29/2024 she did receive a single dose of octreotide  Begin empiric Flagyl 500 mg 3 times daily x 10 days  8/30/2024 discussed negative stool studies.  We will add Levaquin to already  prescribed Flagyl to complete management of diverticulitis.  It is unclear if the patient's symptoms are related to diverticular flare given her previous abdominal pain and fever versus chemotherapy.  However, her symptoms are currently improved  9/4/2024 diarrhea has resolved.  Stools are now more soft with some form but she is having difficulty with passage of stool, having to change positions on the toilet to avoid pain.  We are referring back to Dr. Ye as detailed above.  She will finish out the Flagyl and Levaquin as prescribed having roughly 3 days left of both.  We will also adjust doses of 5-FU/leucovorin today with concern for potential chemotherapy-induced diarrhea.  If she does well this cycle we can increase back to full dose with cycle 3 per Dr. Nguyen.  On 9/18/2024 patient reports that she experienced significant diarrhea during cycle #2 chemotherapy on 09/04/2024, leading to a 30% dose reduction. Despite taking Lomotil 2 tablets four times a day, Imodium, and Pepto-Bismol, her diarrhea persisted. She received octreotide shots twice, which improved her symptoms from watery to mushy stools but did not fully resolve the issue. She will continue with the current regimen and consider adding octreotide to her home regimen if diarrhea starts at home. The potential side effects of octreotide, including nausea, were discussed.   10/1/2024 she reports using octreotide self-injection at home, which has significantly improved her diarrhea. Most of the time, she only requires it once a day and occasionally twice a day, depending on the severity of her diarrhea. She is satisfied with the results, which have improved her quality of life and ability to eat properly.   She will also use Lomotil and the Imodium as needed.  Patient is very tearful in the office 10/16/2024 regarding debilitating diarrhea and interference with quality of life.  She questions potential surgical intervention and placement of colostomy  due to ongoing pain in her rectum and burning of her skin from diarrhea.  We discussed adjustments today including increased dose of octreotide to 200 mcg 3 times daily for diarrhea.  Chemotherapy dose adjustments.  Diarrhea was slightly improved following most recent cycle of chemotherapy.  Continue supportive care  12/4/2024 patient reports that Avastin may be the agent causing her diarrhea.  So we will decrease dose by 20% and to see how things going.  Improved diarrhea with dose reduction to Avastin.  Continue previous dosing      3.   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  Low-dose Lovenox discontinued and initiated Xarelto 20 mg daily.  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  Patient held Xarelto for 2 days prior to MediPort removal from the left chest wall and placement of new port in the right chest wall on 12/18/2020.  She resumed Xarelto that evening.  Progressive swelling in the neck, face, upper extremities prompting hospitalization with CT scan showing thrombosis in the right subclavian vein and SVC.  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  Although she had been off of Xarelto for a few days, clearly Xarelto was not prohibiting progression of her thrombosis after she resumed treatment and there was some evidence to suggest thrombosis had been present at least in the innominate vein on prior scan in September but now  appears chronic.  Current acute thrombosis involving SVC and right subclavian.  Patient was admitted and placed on heparin drip  Patient was evaluated by vascular surgery and intervention was not recommended for thrombolysis/thrombectomy.  On 12/22/2020, the patient developed worsening shortness of breath, increasing upper extremity edema consistent with worsening SVC syndrome.  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  Symptoms improved and patient was discharged 12/24/2020 on Lovenox 100 mg every 12 hours.    Returns 12/29/2020 for evaluation continuing Lovenox, overall tolerating it well.  No bleeding issues.  Improved edema 1/5/2021.  Will continue Lovenox weight-based every 12 hours.  On 1/19/2021 and 2/2/2021, patient reports improved facial swelling.  She however does have being bruise on her abdomen wall where she does Lovenox injection.  However she does not have a spontaneous epistaxis, gum bleeding or other bleeding.   On 2/2/2021, we discussed that side effects of Avastin/biosimilar related to thrombosis.  Since this patient already has been on Lovenox, I think the benefits from treatment for her cancer will outweigh that risk of thrombosis, will proceed ahead with Avastin.  I cautioned patient to watch out for signs of worsening thrombosis patient voiced understanding.   5/11/2021 Lovenox dosing will be adjusted due to patient's weight loss.  We discussed she needs 1 mg/kg.  Her syringes are 100 mg syringes she will do 0.9 mL subcu every 12 hours.  8/3/2021 Patient continues to have bruising on abdomen from lovenox injections.  Will need to monitor weight and adjust dosing in future if further weight loss.   Stable condition on 9/28/2021.  Continue Lovenox anticoagulation.    Patient reports no chest pain no dyspnea no leg edema. However she had bruising and also most recently abnormal  small abscess for which she actually went to ER on 11/29/2021, had I&D. The wound is healing. No further drainage. Denies fever sweating or chills. After discussion, she will continue Lovenox injection for now.   On 3/23/2022, patient reports good tolerance of Lovenox.  Nevertheless she still has small quantity of pus in the left lower abdomen abscess, she squeezes it every day to prevent it became worse.  She reports no fever sweating chills.  Recommended to start Augmentin 875 mg twice a day for 7 days.  She will continue Lovenox indefinitely.  On 4/12/2022, patient reports resolution of the small abscess at left lower abdominal wall.   On 5/10/2022, patient continues on Lovenox indefinitely.  No easy bleeding or bruising.  6/23/2022 continuing on Lovenox 1 mg/kg at a dose of 100 mg (patient weight is 96.6 kg today) every 12 hours.  On 1/17/2023, patient reports good tolerance, Lovenox will be continued.    Patient had a venous Doppler study on 02/05/2023, which reported acute left upper extremity DVT in the subclavian vein, axillary and the brachial vein, and also superficial thrombophlebitis in the basilic upper arm.   On 04/19/2023, patient reports that she was not compliant with anticoagulation at the time of recurrent DVT. She now is fully compliant and is using Lovenox.  Continues on anticoagulation without signs or symptoms of bleeding.  She does have occasional irritation and bleeding with wiping related to frequent diarrhea  Due to right sided pleuritic pain the patient was seen in the emergency department 10/22/2024 with a negative CT angiogram.  Without thrombosis.  Anticoagulation continued.  Patient had CTA again on 11/2/2024 due to continued pleuritic pain that was negative for PE.  12/18/2024 tolerating anticoagulation with Lovenox.      4.  Mild anemia.   She also has history of iron deficiency.  Iron studies reveal iron saturation of 10%.  She was started on oral iron but was unable to tolerate  due to GI side effects.   Status post Injectafer 2/5/2020 and 2/12/2020   Improved hemoglobin 13.5 on 1/5/2021.  3/16/2020 when hemoglobin now up to 16.2, hematocrit 47.6.  Patient admits that she has started smoking again, and I have encouraged her to quit.  She denies any snoring or sleep apnea diagnosis.  Patient is not taking oral iron.  We will closely monitor.  3/30/2020 hemoglobin 15.7, HCT 47.5.  Patient states that she has cut back significantly on her smoking, although not quit yet.  I have encouraged her to continue working on this.  We will continue to closely monitor.  Hemoglobin 14.6 on 6/8/2021 at the meantime he has worsening macrocytosis .6.    Laboratory studies 6/22/2021 reported excellent folate > 20 ng/mL, however low normal B12 level 357 pg/mL.    On 7/6/2021, normal hemoglobin 15.8, .9 and HCT 47.2%.  Patient was asked to start oral vitamin B12 at 1000 mcg daily.   9/14/2021 hemoglobin 17.0, hematocrit 52.1.  Monitor.  On 9/28/2021 hemoglobin 16.1 hematocrit 48.5%, continue to monitor.   Lab study on 12/14/2021 reported excellent ferritin 296 and iron saturation 25% with free iron 104 TIBC 424. Hemoglobin was 15.2 with .2.   Normal hemoglobin 14.6 on 3/15/2022.  Iron study reported normal ferritin 129.5 ng/mL however slightly low iron saturation 11%.  Continue to monitor for now.  9/4/2024 hemoglobin is normal at 14.0  Hemoglobin is normal today, 12/18/2024 at 14.2    5.  Peripheral neuropathy secondary to chemotherapy.   This is mild involving bilateral hands and feet as reported on 9/16/2020.   Will avoid chemotherapy with oxaliplatin.  Stable mild neuropathy as of 11/10/2020  Patient reports worsening peripheral neuropathy and cycle #5 chemotherapy on 12/8/2020 with and without irinotecan.   On 1/5/2021, patient reports improvement of peripheral neuropathy, will add irinotecan back to chemotherapy.  Continue to monitor and adjust medication.  Stable.      6.   Depression.  Patient seen by JULIET Davenport on 11/9/2020.  She was started on mirtazapine.  Lexapro was discontinued.  This has definitely improved her mood with the patient feeling overall much better.  She is sleeping better.  Appetite is improved with her actually eating more than she wants to.    Condition is stable.  She plans to continue follow-up with supportive oncology.  Patient is very tearful in the office today regarding need for dose adjustment to chemotherapy due to significant toxicity.    7. Malfunction of the portal catheter  Patient reports there was no blood drawn from the portal catheter. CT scan of the chest on 7/7/2023 reported occlusion of the SVC around to the Port-A-Cath tubing. I recommend trying activase to see if it helps.  Otherwise, we may have to remove the catheter. Clinically, patient has no signs of SVC syndrome.  On 11/03/2023, the patient reports that her Port-A-Cath was able to be used for a CT scan for the injection of intravenous dye; however, there was no blood return, so we needed to draw from her arm for the blood test. We will continue to keep Port-A-Cath for now.  On 02/09/2024, the patient reports that the port was able to be flushed. However, no blood was returned. We will continue to flush every 6 weeks.  9/4/2024 she continues to have no blood return from her port but can taste saline as we flush at each time and is functioning well otherwise.  Monitor.  10162/2024 no specific problem.  Port dye study 10/31/2024 showing correct position of port.  Fibrin sheath present.  No issue today.    8. Internal hemorrhoids.  She has large internal hemorrhoids diagnosed during a flexible sigmoidoscopy on 09/11/2024. Hydrocortisone cream was prescribed for treatment.     9.  This patient also has strong family history for malignancy   The patient had appointment with genetic counseling on September 3, 2019.  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    10.  Hypertension.  Continue management of hypertension and follow up with PCP.    11.  Chest and back pain  Ports this has been ongoing for about 4 weeks.  Started in her right chest/rib cage.  Originally this pain had completely resolved, however returned when she received her next chemotherapy infusion. She felt like the pain worsened after she was unhooked from her 5-FU and that the pain spread to her right scapular/back area.  Since unhook she has continued with pain in her right chest/back that has required her to take pain medicine regularly which is not typical for her.  She has pain with deep inspiration and pain with certain movements.  She also has pain when she pushes on the tissue around her port, though there is no redness or warmth around her port site aside from red rash in the shape of a bandaid and she reports being sensitive to bandaids.  Reviewed all of patients symptoms with Dr. Nguyen who recommended obtaining doppler right upper extremity and PET scan for further evaluation of this severe right sided pain that has been evaluated by CTA without any signs of what could be causing her pain.  Of note she was seen in the ED 11/2/2024, had CT chest performed which was negative for PE.  EKG and troponin looked okay.  She was discharged home.    Healing fracture of the right 2nd rib. The PET scan on 11/12/2024 showed new hypermetabolic activity at a healing fracture on the anterior aspect of the right second rib with SUV 5.7. The patient should avoid activities that may exacerbate the pain and consider pain management options as needed.    12/4/2024 the patient reports improvement in rib pain, with the ability to lay on the affected side, though still experiencing some soreness and pain upon sneezing or deep breathing.          PLAN:    Proceeded today with palliative chemotherapy 5-FU leucovorin and bevacizumab.  Avastin will be maintained at 4 mg/kg, and 5-FU will be increased to 1680 mg/m².   Patient  return in 2 days to discontinue 5-FU infusion.  Continue octreotide, currently utilizing 100 mcg every 8 hours following chemotherapy  Continue Imodium and Lomotil as needed.  Continue Protonix 40 mg daily.   Continue Gas-X.   Continue anticoagulation with Lovenox.  Continue Magic barrier cream rectum as needed   Continue anticoagulation with Lovenox subcu injection every 12 hours.   Return every 2 weeks for CBC CMP MD or NP  and continued 5-FU leucovorin and bevacizumab    The patient is on a high risk medication requiring close monitoring for toxicity.     Patience Lorenzo, APRN  12/18/2024        CC:  Deepika Vela III NP-C   MD Alverto Bowers MD

## 2024-12-20 ENCOUNTER — INFUSION (OUTPATIENT)
Dept: ONCOLOGY | Facility: HOSPITAL | Age: 44
End: 2024-12-20
Payer: COMMERCIAL

## 2024-12-20 DIAGNOSIS — C20 ADENOCARCINOMA OF RECTUM: ICD-10-CM

## 2024-12-20 DIAGNOSIS — Z45.2 ENCOUNTER FOR FITTING AND ADJUSTMENT OF VASCULAR CATHETER: ICD-10-CM

## 2024-12-20 DIAGNOSIS — C78.00 MALIGNANT NEOPLASM METASTATIC TO LUNG, UNSPECIFIED LATERALITY: ICD-10-CM

## 2024-12-20 DIAGNOSIS — Z79.899 ENCOUNTER FOR LONG-TERM (CURRENT) USE OF HIGH-RISK MEDICATION: Primary | ICD-10-CM

## 2024-12-20 PROCEDURE — 25010000002 HEPARIN LOCK FLUSH PER 10 UNITS: Performed by: INTERNAL MEDICINE

## 2024-12-20 RX ORDER — HEPARIN SODIUM (PORCINE) LOCK FLUSH IV SOLN 100 UNIT/ML 100 UNIT/ML
500 SOLUTION INTRAVENOUS AS NEEDED
OUTPATIENT
Start: 2024-12-20

## 2024-12-20 RX ORDER — SODIUM CHLORIDE 0.9 % (FLUSH) 0.9 %
10 SYRINGE (ML) INJECTION AS NEEDED
OUTPATIENT
Start: 2024-12-20

## 2024-12-20 RX ORDER — SODIUM CHLORIDE 0.9 % (FLUSH) 0.9 %
10 SYRINGE (ML) INJECTION AS NEEDED
Status: DISCONTINUED | OUTPATIENT
Start: 2024-12-20 | End: 2024-12-20 | Stop reason: HOSPADM

## 2024-12-20 RX ORDER — HEPARIN SODIUM (PORCINE) LOCK FLUSH IV SOLN 100 UNIT/ML 100 UNIT/ML
500 SOLUTION INTRAVENOUS AS NEEDED
Status: DISCONTINUED | OUTPATIENT
Start: 2024-12-20 | End: 2024-12-20 | Stop reason: HOSPADM

## 2024-12-20 RX ADMIN — Medication 10 ML: at 09:13

## 2024-12-20 RX ADMIN — Medication 500 UNITS: at 09:13

## 2024-12-26 RX ORDER — FAMOTIDINE 20 MG/1
TABLET, FILM COATED ORAL
Qty: 180 TABLET | Refills: 2 | OUTPATIENT
Start: 2024-12-26

## 2024-12-31 ENCOUNTER — INFUSION (OUTPATIENT)
Dept: ONCOLOGY | Facility: HOSPITAL | Age: 44
End: 2024-12-31
Payer: COMMERCIAL

## 2024-12-31 ENCOUNTER — OFFICE VISIT (OUTPATIENT)
Dept: ONCOLOGY | Facility: CLINIC | Age: 44
End: 2024-12-31
Payer: COMMERCIAL

## 2024-12-31 VITALS
HEIGHT: 65 IN | BODY MASS INDEX: 31.52 KG/M2 | HEART RATE: 80 BPM | SYSTOLIC BLOOD PRESSURE: 105 MMHG | WEIGHT: 189.2 LBS | TEMPERATURE: 98.2 F | RESPIRATION RATE: 17 BRPM | DIASTOLIC BLOOD PRESSURE: 75 MMHG | OXYGEN SATURATION: 97 %

## 2024-12-31 DIAGNOSIS — C78.00 MALIGNANT NEOPLASM METASTATIC TO LUNG, UNSPECIFIED LATERALITY: ICD-10-CM

## 2024-12-31 DIAGNOSIS — Z79.01 ON ANTICOAGULANT THERAPY: ICD-10-CM

## 2024-12-31 DIAGNOSIS — C20 ADENOCARCINOMA OF RECTUM: Primary | ICD-10-CM

## 2024-12-31 DIAGNOSIS — C20 ADENOCARCINOMA OF RECTUM: ICD-10-CM

## 2024-12-31 DIAGNOSIS — Z79.899 ENCOUNTER FOR LONG-TERM (CURRENT) USE OF HIGH-RISK MEDICATION: ICD-10-CM

## 2024-12-31 DIAGNOSIS — T45.1X5A CHEMOTHERAPY INDUCED DIARRHEA: ICD-10-CM

## 2024-12-31 DIAGNOSIS — Z79.899 ENCOUNTER FOR LONG-TERM (CURRENT) USE OF HIGH-RISK MEDICATION: Primary | ICD-10-CM

## 2024-12-31 DIAGNOSIS — K52.1 CHEMOTHERAPY INDUCED DIARRHEA: ICD-10-CM

## 2024-12-31 DIAGNOSIS — T45.1X5D ADVERSE EFFECT OF ANTINEOPLASTIC AND IMMUNOSUPPRESSIVE DRUGS, SUBSEQUENT ENCOUNTER: ICD-10-CM

## 2024-12-31 DIAGNOSIS — I26.99 PULMONARY EMBOLISM WITHOUT ACUTE COR PULMONALE, UNSPECIFIED CHRONICITY, UNSPECIFIED PULMONARY EMBOLISM TYPE: ICD-10-CM

## 2024-12-31 DIAGNOSIS — Z79.899 ON ANTINEOPLASTIC CHEMOTHERAPY: ICD-10-CM

## 2024-12-31 LAB
ALBUMIN SERPL-MCNC: 3.3 G/DL (ref 3.5–5.2)
ALBUMIN/GLOB SERPL: 1.3 G/DL
ALP SERPL-CCNC: 81 U/L (ref 39–117)
ALT SERPL W P-5'-P-CCNC: 16 U/L (ref 1–33)
ANION GAP SERPL CALCULATED.3IONS-SCNC: 11.3 MMOL/L (ref 5–15)
AST SERPL-CCNC: 23 U/L (ref 1–32)
BASOPHILS # BLD AUTO: 0.01 10*3/MM3 (ref 0–0.2)
BASOPHILS NFR BLD AUTO: 0.2 % (ref 0–1.5)
BILIRUB SERPL-MCNC: 0.2 MG/DL (ref 0–1.2)
BILIRUB UR QL STRIP: NEGATIVE
BUN SERPL-MCNC: 6 MG/DL (ref 6–20)
BUN/CREAT SERPL: 10.2 (ref 7–25)
CALCIUM SPEC-SCNC: 8.4 MG/DL (ref 8.6–10.5)
CHLORIDE SERPL-SCNC: 105 MMOL/L (ref 98–107)
CLARITY UR: CLEAR
CO2 SERPL-SCNC: 23.7 MMOL/L (ref 22–29)
COLOR UR: ABNORMAL
CREAT SERPL-MCNC: 0.59 MG/DL (ref 0.57–1)
DEPRECATED RDW RBC AUTO: 52.4 FL (ref 37–54)
EGFRCR SERPLBLD CKD-EPI 2021: 113.4 ML/MIN/1.73
EOSINOPHIL # BLD AUTO: 0.15 10*3/MM3 (ref 0–0.4)
EOSINOPHIL NFR BLD AUTO: 2.5 % (ref 0.3–6.2)
ERYTHROCYTE [DISTWIDTH] IN BLOOD BY AUTOMATED COUNT: 14.6 % (ref 12.3–15.4)
GLOBULIN UR ELPH-MCNC: 2.6 GM/DL
GLUCOSE SERPL-MCNC: 124 MG/DL (ref 65–99)
GLUCOSE UR STRIP-MCNC: NEGATIVE MG/DL
HCT VFR BLD AUTO: 38.9 % (ref 34–46.6)
HGB BLD-MCNC: 13.2 G/DL (ref 12–15.9)
HGB UR QL STRIP.AUTO: ABNORMAL
IMM GRANULOCYTES # BLD AUTO: 0.02 10*3/MM3 (ref 0–0.05)
IMM GRANULOCYTES NFR BLD AUTO: 0.3 % (ref 0–0.5)
KETONES UR QL STRIP: NEGATIVE
LEUKOCYTE ESTERASE UR QL STRIP.AUTO: NEGATIVE
LYMPHOCYTES # BLD AUTO: 1.16 10*3/MM3 (ref 0.7–3.1)
LYMPHOCYTES NFR BLD AUTO: 19 % (ref 19.6–45.3)
MCH RBC QN AUTO: 33.4 PG (ref 26.6–33)
MCHC RBC AUTO-ENTMCNC: 33.9 G/DL (ref 31.5–35.7)
MCV RBC AUTO: 98.5 FL (ref 79–97)
MONOCYTES # BLD AUTO: 0.55 10*3/MM3 (ref 0.1–0.9)
MONOCYTES NFR BLD AUTO: 9 % (ref 5–12)
NEUTROPHILS NFR BLD AUTO: 4.21 10*3/MM3 (ref 1.7–7)
NEUTROPHILS NFR BLD AUTO: 69 % (ref 42.7–76)
NITRITE UR QL STRIP: NEGATIVE
NRBC BLD AUTO-RTO: 0 /100 WBC (ref 0–0.2)
PH UR STRIP.AUTO: 6.5 [PH] (ref 4.5–8)
PLATELET # BLD AUTO: 166 10*3/MM3 (ref 140–450)
PMV BLD AUTO: 9.7 FL (ref 6–12)
POTASSIUM SERPL-SCNC: 3.5 MMOL/L (ref 3.5–5.2)
PROT SERPL-MCNC: 5.9 G/DL (ref 6–8.5)
PROT UR QL STRIP: ABNORMAL
RBC # BLD AUTO: 3.95 10*6/MM3 (ref 3.77–5.28)
SODIUM SERPL-SCNC: 140 MMOL/L (ref 136–145)
SP GR UR STRIP: 1.02 (ref 1–1.03)
UROBILINOGEN UR QL STRIP: ABNORMAL
WBC NRBC COR # BLD AUTO: 6.1 10*3/MM3 (ref 3.4–10.8)

## 2024-12-31 PROCEDURE — G0498 CHEMO EXTEND IV INFUS W/PUMP: HCPCS

## 2024-12-31 PROCEDURE — 25010000002 BEVACIZUMAB PER 10 MG: Performed by: INTERNAL MEDICINE

## 2024-12-31 PROCEDURE — 85025 COMPLETE CBC W/AUTO DIFF WBC: CPT

## 2024-12-31 PROCEDURE — 80053 COMPREHEN METABOLIC PANEL: CPT

## 2024-12-31 PROCEDURE — 25010000002 DEXAMETHASONE SODIUM PHOSPHATE 100 MG/10ML SOLUTION: Performed by: INTERNAL MEDICINE

## 2024-12-31 PROCEDURE — 25810000003 SODIUM CHLORIDE 0.9 % SOLUTION 250 ML FLEX CONT: Performed by: INTERNAL MEDICINE

## 2024-12-31 PROCEDURE — 96368 THER/DIAG CONCURRENT INF: CPT

## 2024-12-31 PROCEDURE — 25010000002 FOSAPREPITANT PER 1 MG: Performed by: INTERNAL MEDICINE

## 2024-12-31 PROCEDURE — 96413 CHEMO IV INFUSION 1 HR: CPT

## 2024-12-31 PROCEDURE — 96367 TX/PROPH/DG ADDL SEQ IV INF: CPT

## 2024-12-31 PROCEDURE — 25010000002 FLUOROURACIL PER 500 MG: Performed by: INTERNAL MEDICINE

## 2024-12-31 PROCEDURE — 81003 URINALYSIS AUTO W/O SCOPE: CPT

## 2024-12-31 PROCEDURE — 25010000002 PALONOSETRON PER 25 MCG: Performed by: INTERNAL MEDICINE

## 2024-12-31 PROCEDURE — 25010000002 LEUCOVORIN CALCIUM PER 50 MG: Performed by: INTERNAL MEDICINE

## 2024-12-31 PROCEDURE — 96375 TX/PRO/DX INJ NEW DRUG ADDON: CPT

## 2024-12-31 RX ORDER — SODIUM CHLORIDE 9 MG/ML
1000 INJECTION, SOLUTION INTRAVENOUS ONCE
OUTPATIENT
Start: 2025-01-02

## 2024-12-31 RX ORDER — SODIUM CHLORIDE 9 MG/ML
20 INJECTION, SOLUTION INTRAVENOUS ONCE
Status: CANCELLED | OUTPATIENT
Start: 2024-12-31

## 2024-12-31 RX ORDER — PALONOSETRON 0.05 MG/ML
0.25 INJECTION, SOLUTION INTRAVENOUS ONCE
Status: CANCELLED | OUTPATIENT
Start: 2024-12-31

## 2024-12-31 RX ORDER — SODIUM CHLORIDE 9 MG/ML
20 INJECTION, SOLUTION INTRAVENOUS ONCE
Status: DISCONTINUED | OUTPATIENT
Start: 2024-12-31 | End: 2024-12-31 | Stop reason: HOSPADM

## 2024-12-31 RX ORDER — PALONOSETRON 0.05 MG/ML
0.25 INJECTION, SOLUTION INTRAVENOUS ONCE
Status: COMPLETED | OUTPATIENT
Start: 2024-12-31 | End: 2024-12-31

## 2024-12-31 RX ADMIN — LEUCOVORIN CALCIUM 560 MG: 350 INJECTION, POWDER, LYOPHILIZED, FOR SUSPENSION INTRAMUSCULAR; INTRAVENOUS at 11:31

## 2024-12-31 RX ADMIN — FLUOROURACIL 3360 MG: 50 INJECTION, SOLUTION INTRAVENOUS at 12:46

## 2024-12-31 RX ADMIN — PALONOSETRON HYDROCHLORIDE 0.25 MG: 0.25 INJECTION INTRAVENOUS at 11:13

## 2024-12-31 RX ADMIN — BEVACIZUMAB 360 MG: 400 INJECTION, SOLUTION INTRAVENOUS at 12:08

## 2024-12-31 RX ADMIN — FOSAPREPITANT DIMEGLUMINE 100 ML: 150 INJECTION, POWDER, LYOPHILIZED, FOR SOLUTION INTRAVENOUS at 10:39

## 2024-12-31 RX ADMIN — DEXAMETHASONE SODIUM PHOSPHATE 12 MG: 10 INJECTION, SOLUTION INTRAMUSCULAR; INTRAVENOUS at 11:12

## 2024-12-31 NOTE — PROGRESS NOTES
REASON FOR FOLLOW UP:     Rectal cancer, rectal ultrasound examination in July 2019 reported T3N0 disease without lymphadenopathy. She does have small pulmonary nodule 6-7 mm and 2 mm with indeterminate feature. There is no solid evidence of metastatic disease otherwise. Patient has stage IIA (T3N0M0) disease.  The patient is heterozygous factor V Leiden, was on prophylactic anticoagulation with Lovenox 40 mg daily given her increased risk of thrombosis with MediPort and GI malignancy.   PET scan on 8/8/2019 reported an 8 mm hypermetabolic right upper lobe pulmonary nodule, which is suspicious for metastatic as well.    Patient had a PowerPort placement on the left upper chest by Dr. Joseph on 8/9/2019.  Patient was started on concurrent infusional 5-FU chemoradiation therapy on 8/12/2019, with planned complete surgical resection and further adjuvant chemotherapy with intention to cure the disease.   Patient underwent surgical resection of the primary rectal cancer by Dr. Ye on 12/2/2019.  Pathology evaluation reported residual T3N1 disease stage IIIb.  Diarrhea related to therapy and radiation.   Patient was started cycle 1 day 1 of adjuvant FOLFOX 6 on 1/23/2020.  On 2/5/2020 FOLFOX 6 cycle 2 delayed secondary to neutropenia.  Patient was given 3 days of Granix injection.  After cycle #2 we planned 3 days of Granix with each cycle.   Patient also intolerant of oral iron.  Patient received 2 doses of IV injectafer on 02/05/2020 and 02/12/2020.   02/12/2020 Proceed with cycle #2 of FOLFOX 6 with 3 days of Granix.  On 3/11/2020 cycle 4 postponed secondary to abdominal pain and occasional low-grade fevers.  CT scans ordered.  Cycle #4 resumed after CT scan revealed no evidence of disease.  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 Ye. patient has no fever.  Continue to observe.   Grade 3 hand-foot syndrome secondary to 5-FU after cycle #7  FOLFOX 6 chemotherapy, prompting ER visit.  Also has worsening peripheral neuropathy.   Cycle #8 FOLFOX 6 was given on 5/13/2020, with 50% dose reduction for both 5-FU and oxaliplatin, and also examination of bolus 5-FU.   PET scan examination on 5/21/2020 reported no evidence of metastatic disease in the chest abdomen pelvis.  Stable 6 mm RUL pulmonary nodule.  On 5/27/2020, I discussed with the patient that we can discontinue chemotherapy at this time.   Patient had a surgical procedure for low anterior colon resection, coloanal anastomosis on 8/3/2020.  CT scan for chest abdomen pelvis on 9/9/2020 reported a new 8 mm noncalcified pulmonary nodule in the left lower lobe of the lung.  Otherwise stable right upper lobe 6 mm pulmonary nodule, and no evidence of disease recurrence in the abdomen or pelvis.  PET scan examination on 9/18/2020 reported multiple hypermetabolic small pulmonary nodules/ new pulmonary nodules.   Patient was started on pelvic chemotherapy with FOLFIRI regimen on 10/13/2020.   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 It reported tumor mutation burden 5 Muts/Mb, microsatellite stable, TP53 mutation [id number], and others.   Cycle #5 was without irinotecan, due to peripheral neuropathy.  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  Patient was switched to weight-based Lovenox injection.  Cycle #6 5-FU and irinotecan was delayed by 2 weeks because of the above incident.  Patient had a telemedicine evaluation at that the [facility] [place] in February 2021.  They agreed with our treatment plan for palliative chemotherapy followed by maintenance chemotherapy.    PET scan examination on 4/6/2021 after cycle #12 palliative chemotherapy reported no evidence of hypermetabolic metastatic lesion.   Patient was started on maintenance chemotherapy with 5-FU and  Avastin on 4/13/2021. (Unable to tolerate Xeloda because of a significant hand-foot syndrome).   PET scan on 9/24/2021 obtained after cycle #12 maintenance chemotherapy with 5-FU, leucovorin, Avastin reported no evidence for active disease. We recommended 3 months chemotherapy holiday.  CT scan examination on 3/15/2022 reported slightly disease progression with increase in size of small pulmonary nodule.  We started patient back on maintenance chemotherapy on 3/29/2022 treatment with 5-FU plus leucovorin and Avastin, repeating every 2 weeks.  After 6 more maintenance chemotherapy, CT scan for chest abdomen pelvis was done on 6/21/2022 which reported stable sub-6 mm pulmonary nodules.  Patient had last maintenance chemotherapy on 6/7/2022.   Disease progression, patient was restarted back on chemotherapy with 5-FU leucovorin and bevacizumab 8/21/2024      HISTORY OF PRESENT ILLNESS:  The patient is a 44 y.o. female with the above-mentioned history, who is seen today for evaluation.     The patient returns to the office today, 12/18/2024 in anticipation of 5-FU and Avastin.  With treatment 2 weeks ago, we did reduce the dose of Avastin.  She reports she tolerated treatment very well.  Her bowels remain controlled with octreotide.  She reports her energy is adequate.  She is able to do activities outside of the home. She has no new pain. She has no nausea or vomiting.       History of Present Illness  The patient presented today on 12/31/2024 for evaluation prior to her chemotherapy. This is 1 day earlier because the clinic is closed tomorrow due to the New Year's day holiday.    She reports a decrease in appetite, which she attributes to a recent episode of diarrhea following the consumption of sweets. She has been abstaining from her usual diet, including rice.  Otherwise she has not experienced any significant episodes of diarrhea after chemotherapy. She continues to administer octreotide three times daily with  excellent results.    She does not experience any lower extremity edema.  She is not currently on any potassium supplements.        Results  Laboratory Studies on 12/31/2024  CBC is unremarkable including hemoglobin 13.2, platelets 166,000, WBC 6100, neutrophils 4200. Chemistry lab reported normal liver function panel, glucose 124, calcium 8.4, albumin 3.3 otherwise unremarkable CMP. Protein is slightly down at 5.9. Potassium is 3.5.        Past Medical History:   Diagnosis Date    Abdominopelvic abscess 08/12/2020    ADMITTED TO [facility]    Abnormal Pap smear of cervix 02/02/1998    JULIUS I    Abscess of abdominal wall 11/28/2012    SEEN AT [facility] ER    Anemia in pregnancy     Anxiety     Bilateral epiphora 04/2020    Bilateral hand swelling 05/02/2020    SEEN AT [facility] ER    Cervical lymphadenitis 06/06/2012    SEEN AT [facility] ER    Chemotherapy induced diarrhea 01/2021    Chemotherapy induced neutropenia 04/2020    Chemotherapy-induced nausea 02/2020    Chemotherapy-induced thrombocytopenia 05/2020    Chronic anticoagulation     Chronic diarrhea     Colon polyps     FOLLOWED BY DR. 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 ALEXANDRU HUNT    History of gestational diabetes     History of pre-eclampsia 1998    History of pre-eclampsia     History of radiation therapy 2019    FOLLOWED BY DR. JAVON LEWIS    History of TB skin testing 01/17/2009    TB Skin Test     History of TB skin testing 2009    TB Skin Test    History of transfusion 2019    HPV in female 1998    Hypokalemia 2019    Hypomagnesemia 2019    Hyponatremia 2021    IBS (irritable bowel syndrome)     Ileostomy present 2020    Take down on 11/3/2022    Infected insect bite of neck 2016    SEEN AT [facility]    Kidney stones 2007    SEEN AT [facility] ER    Low anterior resection syndrome 2022    FOLLOWED BY DR. PADMAJA ESPARZA    Lump of right breast     SWOLLEN LYMPH NODE    Lung cancer 2020    METASTATIC LUNG CANCER    Macrocytosis 2021    Monopolar depression     On anticoagulant therapy     Pain associated with surgical procedure 2020    Palmar-plantar erythrodysesthesia 2021    Perirectal abscess 2020    Pulmonary embolism without acute cor pulmonale 09/20/2019    X 3; ADMITTED TO [facility]    Pulmonary nodule, right 2020    Rectal cancer 2019    STAGE IIA, INVASIVE MODERATELY DIFFERENTIATED ADENOCARCINOMA, CHEMO AND XRT FINISHED 2019    Right shoulder pain 2020    SEEN AT [facility] ER    Right ureteral stone 10/01/2019    SEEN AT [facility] ER    SAB (spontaneous ) 1996     A2-1 INDUCED    Sciatica     Sepsis due to cellulitis 2002    LEFT GREAT TOE, ADMITTED TO [facility]    Tachycardia 2020    Thrombosis of superior vena cava 2020    AND BRACHIOCEPHALIC VEIN, ADMITTED TO [facility]    Urinary urgency 2020   Patient had COVID-19 infection diagnosed on 2022 despite was fully vaccinated and boosted.  She recovered without problem.      Past Surgical History:   Procedure Laterality Date    ABDOMINAL SURGERY      CHOLECYSTECTOMY N/A 10/10/2003    LAPAROSCOPIC WITH CHOLANGIOGRAM, DR. JAMEY TALAVERA AT [facility]    COLON RESECTION N/A 2019    Procedure: laparoscopic low anterior colon resection with mobilization of splenic flexure and diverting loop ileostomy: ERAS;  Surgeon: Padmaja Esparza MD;  Location: [facility] OR;   Service: General    COLON RESECTION N/A 08/03/2020    Procedure: LOW ANTERIOR COLON RESECTION, COLOANAL ANASTOMOSIS, MOBILIZATION SPLENIC FLEXURE;  Surgeon: Padmaja Epsarza MD;  Location: [facility];  Service: General;  Laterality: N/A;    COLONOSCOPY N/A 07/15/2020    PATENT ANASTAMOSIS IN MID RECTUM, RESCOPE IN 1 YR, DR. PADMAJA ESPARZA AT [facility]    COLONOSCOPY N/A 11/03/2022    DIFFUSE AREA OF MODERATELY FRIABLE MUCOSA IN ENTIRE COLON , DIVERSION COLITIS, PATENT AND HEALTHY ANASTAMOSIS, DR. PADMAJA ESPARZA AT [facility]    COLONOSCOPY N/A 12/04/2023    6 MM SESSILE SERRATED ADENOMA POLYP IN DESCENDING, PATENT ANASTAMOSIS IN DISTAL RECTUM, RESCOPE IN 2 YRS, DR. PADMAJA ESPARZA AT [facility]    COLONOSCOPY W/ POLYPECTOMY N/A 07/08/2019    15 MM TUBULOVILLOUS ADENOMA POLYP IN HEPATIC FLEXURE, 20 MMTUBULOVILLOUS ADENOMA WITH HIGH GRADE DYSPLASIA IN RECTOSIGMOID, 4 CM MASS IN MID RECTUM, PATH: INVASIVE MODERATELY DIFFERENTIATED ADENOCARCINOMA, DR. JENNIFER LI AT [facility]    DILATATION AND EVACUATION N/A 2009    ENDOSCOPY N/A 07/08/2019    LA GRADE A ESOPHAGITIS, GASTRITIS, ALL BIOPSIES BENIGN, DR. JENNIFER LI AT [facility]    ILEOSTOMY CLOSURE N/A 11/14/2022    Procedure: ILEOSTOMY TAKEDOWN;  Surgeon: Padmaja Esparza MD;  Location: [facility];  Service: General;  Laterality: N/A;    INCISION AND DRAINAGE PERIRECTAL ABSCESS N/A 08/14/2020    Procedure: INCISION AND DRAINAGE OF retrorectal dehiscence abcess with drain placement and irrigation;  Surgeon: Padmaja Esparza MD;  Location: [facility];  Service: General;  Laterality: N/A;    PAP SMEAR N/A 01/24/2014    SIGMOIDOSCOPY N/A 07/24/2019    INFILTRATIVE PARTIALLY OBSTRUCTING LARGE RECTAL CANCER, AREA TATOOED, DR. PADMAJA ESPARZA AT [facility]    SIGMOIDOSCOPY N/A 11/23/2019    AN ULCERATED PARTIALLY OBSTRUCTING MASS IN MID RECTUM, AREA TATTOOED, DR. PADMAJA ESPARZA AT [facility]    SIGMOIDOSCOPY N/A 07/22/2021    PATENT ANASTAMOSIS IN DISTAL RECTUM, RESCOPE IN 1 YR,   GERONIMO YE AT [facility]    SIGMOIDOSCOPY N/A 09/11/2024    PATCHY INFLAMMATION AND EDEMA IN DESCENDING, AREA BIOPSIED AND WAS BENIGN, PATENT AND HEALTHY ANASTAMOSIS, HEMORRHOIDS,RESCOPE(CY) 12/2025, DR. GERONIMO YE AT [facility]    TRANSRECTAL ULTRASOUND N/A 07/24/2019    Procedure: ULTRASOUND TRANSRECTAL;  Surgeon: Geronimo Ye MD;  Location: [facility] ENDOSCOPY;  Service: General    URETEROSCOPY LASER LITHOTRIPSY WITH STENT INSERTION Right 10/30/2019    DR. ESTUARDO BEASLEY AT [facility]    VAGINAL DELIVERY N/A 09/18/1998    VENOUS ACCESS DEVICE (PORT) INSERTION Left 08/09/2019    Procedure: INSERTION VENOUS ACCESS DEVICE;  Surgeon: Sj Joseph MD;  Location:  [facility];  Service: General    VENOUS ACCESS DEVICE (PORT) INSERTION N/A 12/18/2020    Procedure: INSERTION VENOUS ACCESS DEVICE right side, removal venous access device left side;  Surgeon: Sj Joseph MD;  Location:  [facility];  Service: General;  Laterality: N/A;    WISDOM TOOTH EXTRACTION Bilateral 1993       HEMATOLOGIC/ONCOLOGIC HISTORY:      The patient reports she has intermittent rectal bleeding and urgency, mucous with her stool, starting sometime in 2016. At that time she was referred to GI service, and was diagnosed of irritable bowel syndrome. Nevertheless she had worsening urgency for bowel movement, and had a couple of incidents losing control of stool. She was recently seen by Roland Thorpe MD again and had colonoscopy and EGD exam on 07/08/2019. She was found having a circumferential rectal mass. According to the procedure note, the patient had a fungating circumferential bleeding 4 cm mass in the middle rectum, at distance between 13 cm and 17 cm from the anus. Mass was causing partial obstruction. There were also colon polyps found at the hepatic flexure and also at the rectosigmoid junction 23 cm from the anus. Both were resected and retrieved. EGD examination reported grade A esophagitis at the GE junction and patchy discontinuous  erythema and aggravation of the mucosa without bleeding in the stomach body. There is normal mucosa of the duodenum. Pathology evaluation from this procedure reported moderately differentiated adenocarcinoma involving the rectal mass. The rectal sigmoid junction polyp was tubular/tubulovillous adenoma with high grade dysplasia negative for invasive malignancy. The hepatic flexure polyp was also tubular/tubulovillous adenoma negative for high grade dysplasia or malignancy. The biopsy from the esophagus reports squamocolumnar mucosa with inflammatory changes suggestive of mild reflux esophagitis, negative for interstitial metaplasia. Gastric biopsy was benign and duodenal biopsy was also benign. There is no mention of H-pylori.     Patient was subsequently referred to colorectal surgeon Padmaja Ye MD for further evaluation. The patient had CT scan examination for chest, abdomen and pelvis requested by Dr. Ye and were done on 07/13/2019. The study reported no evidence of lymphadenopathy in the abdomen and pelvis. There was wall thickening of the rectosigmoid junction. Normal spleen, pancreas, adrenal glands and kidneys. There was fatty liver infiltration but no focal lymphatic lesions. There was a small 6-7 mm noncalcified nodule in the right upper lobe and a tiny 3 mm subpleural nodule in the right middle lobe. No mediastinal or hilar lymphadenopathy.     Dr. Ye performed staging rectal ultrasound examination. This study reported tumor penetrating through the muscularis propria as T3 disease; there were no lymph nodes identified.    She had a hospitalization in late September 2019 because of newly discovered pulmonary emboli.  She was on prophylactic Lovenox prior to the incident of PE.  Now she is on full dose Xarelto anticoagulation.  Patient reports no further chest pain dyspnea.  She denies bleeding or bruising.  During her hospitalization, she was seen by Dr. Ye, who plans to have surgery 8 to 12  weeks after finishing radiation therapy.  She finished her radiation on 9/19/2019.     I noticed patient also presented to the emergency room on 10/1/2019 complaining of right flank area pain.  I reviewed the images studies and indeed she has a very small 1 to 2 mm obstructing kidney stone in the UV junction.  Patient is still symptomatic with some pains and dysuria.  She denies fever sweating or chills.    Laboratory study on 10/7/2019 reported normal CBC including hemoglobin 13.1, platelets 301,000, WBC 6170 and ANC 4900 lymphocytes 590 monocytes 480.      The nurse reported malfunction of port-a-catheter on 10/7/2019.  Port study on 10/8/2019 showed fibrin sheath around the distal aspect of the Mediport catheter in the SVC. This does not appear to hinder injection, but does prevent aspiration at this time.    Patient underwent surgical resection of the colon on 12/2/2019 with Dr. Ye.  Pathology evaluation reported residual T3 disease, also 1 out of 14 lymph nodes positive for malignancy.  So this patient in either had at least stage IIIb disease (T3 N1 M0?).      Adjuvant chemotherapy FOLFOX 6 will be started on 1/22/2020.  Laboratory study reported iron saturation 10%, free iron 31 TIBC 319 and ferritin 168.  Her hemoglobin was 11.8, WBC 5600, and platelets 347,000.    Patient was here on 02/12/2020 for cycle 2 of her FOLFOX.  This is delayed x1 week secondary to neutropenia.  The patient ultimately received 3 days worth of Neupogen with recovery of her blood counts.  Of note, the patient struggled with significant nausea without any episodes of vomiting following cycle #1 of chemotherapy resulting in significant weight loss.  She is up 12 pounds over the last week as her appetite has normalized.  We will add Emend to her care plan.    She is having several loose stools per her colostomy and has been hesitant to take Imodium due to prior history of constipation.  I encouraged her to try this with a maximum of  8 tablets/day.  She denies any infectious symptoms including fevers or chills.  No excess bleeding or bruising noted.  She had the expected cold sensitivity related to the Oxaliplatin for about 3 days following treatment.    Labs from 02/12/2020 demonstrated total white blood cell count of 5.14, ANC of 3.06, hemoglobin of 11.2, platelets of 211,000.  She was found to be iron deficient last week and is intolerant to oral iron secondary to GI distress.  For this reason, she initiated IV iron therapy with Injectafer last week.  She had received her second dose 02/12/2020    Patient has normalized hemoglobin 12.2 on 2/26/2020.    She reported on 5/5/2020 she had a recent visit to the emergency department for what was suspected to be an allergic reaction.  She called our on-call service on Saturday with reports of hand and face swelling.  She was instructed to proceed to the emergency department at which time she was assessed and prescribed a Medrol Dosepak.  She had just completed her 5-FU and was unhooked on Friday, 5/3/2020.  Her symptoms have improved.  She does report persistent hyperpigmentation and mild swelling of the palms of the hands but this is much improved.  It was her right hand specifically that was swollen.  Her facial swelling has resolved.  She continues on Cefdinir nd since has 1 day remaining, she was prescribed cefdinir for a UTI requiring hospitalization at the end of April.  Reports no new symptoms.  Her labs are stable.  She is scheduled for treatment again.    Patient states at this time she is not tolerating her chemotherapy well.     Patient seen in the emergency department on 5/2/2020 for what was suspected to be an allergic reaction.  She called our on-call service on Saturday explaining that she was experiencing hand and face swelling.  She was instructed to proceed to the emergency department at which time she was assessed and prescribed a Medrol Dosepak.  She had just completed her 5-FU  and was unhooked on Friday, 5/1/2020.      She reports since her ED visit on 5/2/2020 her symptoms have not improved. Her hands and feet were swollen upon presenting to the ED and she could barely make a fist. She states that she feels so swollen she is not able to stand it. She states that her skin on the hands and feet are peeling extensively as well, besides changing color to more dark.     She also reports significant fatigue after her first week of the 5-FU treatment but she expected this side effect. She also notices significant increase in her neuropathy to the point that she is not able to even walk around in her home without her house slippers due to irritation from her rugs. She denies and associated nausea or vomiting at this time. She also has episodes of epistaxis every day after the chemotherapy cycle #7.  She does report working full-time during the week of chemotherapy.    Laboratory studies, 5/13/2020, show moderate thrombocytopenia platelets 123,000, low normal WBC 4140 including ANC 2720 and normal hemoglobin 13.4.  Because significant hand-foot syndrome, will decrease both 5-FU and oxaliplatin by 50%, and eliminate bolus dose of 5-FU.    On 5/21/2020 patient had a PET scan performed which showed no convincing evidence of residual disease in abdomen or pelvis, no metastatic disease within the chest or neck.     Cycle #8 FOLFOX 6 was given on 5/13/2020, with 50% dose reduction for both 5-FU and oxaliplatin, and also examination of bolus 5-FU.  She states on 5/27/2020 that with the recent reduction of the chemotherapy she feels significantly better. She has more energy and is able to do her daily routine.      PET scan examination on 5/21/2020 reported no evidence of metastatic disease in chest abdomen pelvis.  There was a stable 6 mm right upper lobe pulmonary nodule.    Laboratory studies on 5/27/2020 showed mild leukocytopenia WBC 3720 but a normal ANC 2250 and lymphocytes 630.  Normal platelets  163,000 and hemoglobin 12.6.  Chemistry lab reported normal renal function, liver function panel and a electrolytes, elevated glucose 164.    Laboratory studies 6/24/2020, showed normal hemoglobin 13.4 but macrocytosis .9.  Normal platelets 210,000 and WBC 5870.  She had normal CMP.     Patient last time was here in late June 2020.  Since that time she had surgical procedure for low anterior colon resection, coloanal anastomosis on 8/3/2020.  She later developed a perirectal abscess, required surgical drainage on 8/14/2020.  She was discharged on 8/27/2020.    Patient reports to me she still has lower abdominal wall vacuum suction in place.  She denies fever sweating chills.  Performance status is ECOG 1.  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  She does have visiting nurse come to the home for wound care.    CT scan for chest abdomen pelvis on 9/9/2020 reported a new 8 mm noncalcified pulmonary nodule in the left lower lobe.  Otherwise stable right upper lobe 6 mm pulmonary nodule, and no evidence of disease recurrence in the abdomen or pelvis.     Laboratory study on 9/16/2020 reported elevated AST 55, ALT 95, alk phosphatase 143 but normal total bilirubin 0.3.  Chemistry lab otherwise unremarkable.  She has completed normal CBC.      Because of the abnormal CT scan examination for chest abdomen pelvis on 9/9/2020 reported a new 8 mm noncalcified pulmonary nodule in the left lower lobe, we obtained a PET scan examination for further evaluation, which was done on 9/18/2020.  This study reported several pulmonary nodules, some of them are hypermetabolic, newly developed compared to previous PET scan in May 2020.  Certainly does highly suspicious for metastatic disease.  There are also hypermetabolic activity in the abdomen and pelvic area which is hard to differentiate from surgical scar versus metastatic malignancy.    Laboratory study on 10/13/2020 reported normal CBC with Hb  13.9, platelets 302,000 and WBC 5520 including ANC 3830.  Chemistry lab reported normalized AST 20, alk phosphatase 116 improved ALT 35, and maintains normal bilirubin, with normal electrolytes and liver function panel.     Patient was started on first cycle of palliative chemotherapy FOLFIRI on 10/13/2020.    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  Patient had a new port placed 12/18/2020, and had held her Xarelto for 2 days prior to surgery.  She presented to the ER on 12/21/20 with complaints of facial and arm swelling for 3 days.  She also noted increased shortness of breath.  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  Thrombus within the right internal jugular vein cannot be excluded.  Patient was started on IV heparin, and eventually transitioned to weight-based Lovenox, which she now continues.    PET scan examination on 9/24/2021 reported further shrinking of the tiny right upper lobe pulmonary nodule.  Otherwise no new lesions.  No evidence for metastatic disease in other areas.      Patient had CT scan for chest with IV contrast obtained on 12/14/2021 which reported small tiny stable pulmonary nodules. There is a 4 mm right upper lobe pulmonary nodule. There is also a stable 4 mm left lower lobe pulmonary nodule. There is also stable left upper lobe micronodule.     Laboratory studies today on 12/21/2021 reported normal hemoglobin 15.9 however .0, platelets 218,000 WBC 5360 including neutrophils 3730 lymphocytes 980. Chemistry lab reported normal renal function, electrolytes, glucose, and marginally elevated ALT 55, AST 34 but normal total bilirubin and alk phosphatase.     CT scan for chest abdomen pelvis 6/21/2022 reported sub-6 mm pulmonary nodules either stable or slightly smaller.  No new pulmonary  nodules or evidence for disease progression.  There is no evidence for metastatic disease in the liver however it shows diffuse hepatic steatosis.  There was masslike thickening in the presacral space with the clips or calcification approximately 1.7 cm in greatest AP dimension but stable.    Laboratory study on 9/20/2022 reported normal hemoglobin 15.7 with mild macrocytosis .1.  She has normal CBC and platelets.  She also has unremarkable CMP.  Normal CEA 1.13 ng/mL.    Patient was seen by Dr. Ye, who performed ileostomy takedown on 11/14/2022.  Patient reports that she is recovering.  She still has a small open wound less than 1 cm in diameter, however close to 2 cm deep.  She has been changing dressing herself.  She denies fever sweating chills.    Patient has no other specific complaints.  She has excellent performance status ECOG 0.  She denies chest pain dyspnea cough hemoptysis.  No abdominal pain.  No melena hematochezia.  Patient has been eating well, stable weight.  She works full-time.    She continues Lovenox injections and denies any significant bleeding issues.   No other new problems or concerns.     CT scan for chest abdomen pelvis obtained on 1/10/2023 reported stable small pulmonary nodules, no evidence for active or new disease.    Laboratory study on 1/10/2023 reported normal CBC and normal CMP.  CEA level is 0.99 ng/mL.    CT scan for chest, abdomen, and pelvis on 7/7/2023 reported stable small pulmonary nodules including a left upper lobe 5 mm nodule. There were no new masses, or pleural effusion. No enlarged supraclavicular, axillary, mediastinal, or hilar lymphadenopathy. Mediastinal vasculature normal. There is occlusion of the superior vena around the catheter with body wall  is present. There was no evidence for disease recurrence in the abdomen or pelvis. Bone is also negative for metastatic disease.    Laboratory studies obtained on 07/07/2023 also reported normal  CBC, and normal CMP as well as low level CEA 1.07 ng/mL.    The CT scan for the chest on 10/27/2023 reported enlarging pulmonary nodules bilaterally. The right upper lobe lung nodule increased from 7 x 7 mm to 9 x 8 mm, and the left upper lobe nodule measured 8 x 9 mm from 5 x 6 mm in 04/2023. There is a different left upper lobe nodule 6 x 8 mm from previous 5 x 5 mm. The presacral tissue thickness measures up to 2.3 cm.    A laboratory study on 10/27/2023 reported CEA 1.58 ng/mL, normal CBC, and CMP.  Molecular study of peripheral blood Guardant Reveal is still pending.    The patient presents today on 11/17/2023 for reevaluation to discuss the results of PET scan examination as well as the results of tumor conference. I saw her recently on 11/03/2023 and because of enlarging pulmonary nodules on the CT scan, we requested a PET scan examination. I presented her case yesterday on 11/16/2023 at the [facility].    The patient reports she is at her baseline condition as 2 weeks ago. No specific complaints, no chest pain, dyspnea, cough, etc. She has a regular bowel movement.    Her case was present at the [facility]. on 11/16/2023. The PET scan images and chest CT scan were reviewed in conjunction with her treatment history. The consensus was for patient to receive stereotactic radiation therapy for the right upper lobe lung lesion, and the bigger left upper lobe lesion, and monitor the smaller left upper lobe lesion with further images studies down the line. Also, the conference recommended Guardant Reveal study which we already ordered.     This Guardant Reveal study came back today as negative for ctDNA 0%.      CT of the chest on 2/2/2024 reported shrinking of the right upper lobe lesion from 9 mm to 6 mm, and the left upper lobe lesion also decreased from 9 mm to 6 mm. Otherwise, there are a couple of stable pulmonary nodules, 7 to 8 mm. The right hilar has a small lymph  node that has increased from 6 mm to 8 mm and is otherwise stable. There was no suspicious lesion in the abdomen or pelvis.    Colonoscopy examination 9/11/2024 showed patchy mild inflammation characterized by congestion/edema in the descending colon. Evidence of previous endo coloanal anastomosis in the rectum. Presence of hemorrhoids.      MEDICATIONS    Current Outpatient Medications:     bismuth subsalicylate (PEPTO BISMOL) 262 MG/15ML suspension, Take 15 mL by mouth 4 (Four) Times a Day., Disp: , Rfl:     clonazePAM (KlonoPIN) 1 MG tablet, Take 1 tablet as needed daily for anxiety. May take one additional as needed for severe anxiety., Disp: 45 tablet, Rfl: 3    Cyanocobalamin (VITAMIN B-12 PO), Take 1 tablet by mouth 3 (Three) Times a Week. M-W-F, Disp: , Rfl:     dicyclomine (BENTYL) 20 MG tablet, TAKE 1 TABLET BY MOUTH EVERY 6 (SIX) HOURS AS NEEDED FOR IRRITABLE BOWEL SYMPTOMS (Patient taking differently: Take 1 tablet by mouth Every Evening.), Disp: 360 tablet, Rfl: 2    diphenoxylate-atropine (LOMOTIL) 2.5-0.025 MG per tablet, TAKE 2 TABLETS BY MOUTH 4 TIMES A DAY, Disp: 240 tablet, Rfl: 11    Enoxaparin Sodium (LOVENOX) 100 MG/ML solution prefilled syringe syringe, INJECT 1 ML UNDER THE SKIN INTO THE APPROPRIATE AREA AS DIRECTED EVERY 12 (TWELVE) HOURS., Disp: 60 mL, Rfl: 2    escitalopram (LEXAPRO) 10 MG tablet, Take 1 tablet by mouth Daily., Disp: 90 tablet, Rfl: 1    famotidine (PEPCID) 20 MG tablet, TAKE 1 TABLET BY MOUTH TWICE A DAY (Patient taking differently: Take 1 tablet by mouth Every Evening.), Disp: 180 tablet, Rfl: 2    HYDROcodone-acetaminophen (NORCO) 5-325 MG per tablet, Take 1 tablet by mouth Every 6 (Six) Hours As Needed for Moderate Pain., Disp: 60 tablet, Rfl: 0    hydrocortisone 2.5 % cream, APPLY RECTALLY 3 TIMES DAILY. INCLUDE APPLICATOR., Disp: , Rfl:     Hydrocortisone, Perianal, (Anusol-HC) 2.5 % rectal cream, Apply rectally 3 times daily.  Include applicator., Disp: 30 g, Rfl:  "1    Loperamide HCl (IMODIUM PO), Take  by mouth As Needed., Disp: , Rfl:     Loratadine 10 MG capsule, Take 1 capsule by mouth Every Evening., Disp: , Rfl:     metoprolol succinate XL (TOPROL-XL) 25 MG 24 hr tablet, TAKE 0.5 TABLETS BY MOUTH EVERY NIGHT, Disp: 45 tablet, Rfl: 2    nystatin (MYCOSTATIN) 711885 UNIT/GM cream, Apply 1 Application topically to the appropriate area as directed 2 (Two) Times a Day., Disp: 30 g, Rfl: 2    nystatin-hydrocortisone-bacitracin in zinc oxide ointment, Apply  topically to the appropriate area as directed Daily., Disp: 60 g, Rfl: 2    octreotide (sandoSTATIN) 100 MCG/ML injection, Inject 1 mL under the skin into the appropriate area as directed 3 (Three) Times a Day., Disp: 90 mL, Rfl: 2    ondansetron (ZOFRAN) 8 MG tablet, TAKE 1 TABLET BY MOUTH THREE TIMES A DAY AS NEEDED FOR NAUSEA AND VOMITING, Disp: 60 tablet, Rfl: 1    pantoprazole (Protonix) 40 MG EC tablet, Take 1 tablet by mouth Daily., Disp: 90 tablet, Rfl: 1    rosuvastatin (CRESTOR) 5 MG tablet, TAKE 1 TABLET BY MOUTH EVERY DAY, Disp: 90 tablet, Rfl: 0    Syringe/Needle, Disp, 27G X 1/2\" 1 ML misc, Use as directed for octreotide injections, Disp: 100 each, Rfl: 3    ALLERGIES:   No Known Allergies    SOCIAL HISTORY:       Social History     Tobacco Use    Smoking status: Every Day     Current packs/day: 1.00     Average packs/day: 1 pack/day for 25.0 years (25.0 ttl pk-yrs)     Types: Cigarettes     Passive exposure: Current    Smokeless tobacco: Never    Tobacco comments:     1 PPD/caffeine use    Vaping Use    Vaping status: Some Days    Substances: Nicotine    Devices: Disposable    Passive vaping exposure: Yes   Substance Use Topics    Alcohol use: Not Currently     Comment: RARELY    Drug use: Never         FAMILY HISTORY:   Mother has positive factor V Leiden mutation, although she did not have thrombosis, mother also is coronary disease with stenting, she also is occasional bruising.    Maternal grandmother " "had DVT, she had multiple surgical procedures.    Patient mother's half-brother had metastatic colon cancer at diagnosis in his 50s.    Maternal great aunt had breast cancer.           Vitals:    12/31/24 0927   BP: 105/75   Pulse: 80   Resp: 17   Temp: 98.2 °F (36.8 °C)   TempSrc: Oral   SpO2: 97%   Weight: 85.8 kg (189 lb 3.2 oz)   Height: 165.1 cm (65\")   PainSc: 0-No pain         12/31/2024     9:27 AM   Current Status   ECOG score 0     Physical Exam  Vitals reviewed.   Constitutional:       Appearance: Normal appearance. She is well-developed.   HENT:      Head: Normocephalic and atraumatic.      Comments:         Nose: Nose normal.   Eyes:      Conjunctiva/sclera: Conjunctivae normal.   Cardiovascular:      Rate and Rhythm: Normal rate and regular rhythm.   Pulmonary:      Effort: Pulmonary effort is normal.      Breath sounds: Normal breath sounds.   Abdominal:      General: Bowel sounds are normal.      Palpations: Abdomen is soft. There is no mass.      Tenderness: There is no abdominal tenderness.   Musculoskeletal:      Right lower leg: No edema.      Left lower leg: No edema.   Skin:     Findings: No bruising or rash.   Neurological:      Mental Status: She is alert. Mental status is at baseline.   Psychiatric:         Mood and Affect: Mood normal. Affect is not tearful.       RECENT LABS:  Results from last 7 days   Lab Units 12/31/24  0856   WBC 10*3/mm3 6.10   NEUTROS ABS 10*3/mm3 4.21   HEMOGLOBIN g/dL 13.2   HEMATOCRIT % 38.9   PLATELETS 10*3/mm3 166     Results from last 7 days   Lab Units 12/31/24  0856   SODIUM mmol/L 140   POTASSIUM mmol/L 3.5   CHLORIDE mmol/L 105   CO2 mmol/L 23.7   BUN mg/dL 6   CREATININE mg/dL 0.59   CALCIUM mg/dL 8.4*   ALBUMIN g/dL 3.3*   BILIRUBIN mg/dL 0.2   ALK PHOS U/L 81   ALT (SGPT) U/L 16   AST (SGOT) U/L 23   GLUCOSE mg/dL 124*     Urinalysis without microscopic (no culture) - Urine, Clean Catch (12/18/2024 08:33)           IMAGING:  NM PET/CT Skull Base to Mid " Thigh (08/14/2024 09:17)     NM PET/CT Skull Base to Mid Thigh (11/12/2024 14:53)     Assessment & Plan        ASSESSMENT:   1.  Metastatic rectal cancer.   Initial rectal ultrasound examination reported T3N0 disease without lymphadenopathy.   CT scan of chest, abdomen and pelvis reported no lymphadenopathy in the abdomen, pelvis or chest. She does have small pulmonary nodule 6-7 mm and 2 mm with indeterminate feature. There is no solid evidence of metastatic disease otherwise.   Based on the CT scan and rectal ultrasound imaging studies, this patient had stage IIA (T3N0M0) disease.   She had PET scan examination on 8/8/2019 which reported a hypermetabolic right upper lobe pulmonary nodule 6 mm with SUV 5.6.  This is suspicious for metastatic disease however it is too small to be biopsied.  This patient may have stage IV disease.   She initiated concurrent radiation with continuous 5-FU on 8/12/2019.  Patient finished concurrent chemoradiation therapy.  Patient underwent surgical resection of the rectal tumor and diverting loop ileostomy on 12/2/2019 with Dr. Ye.  Pathology evaluation reported residual T3 disease, with 1 out of 14 lymph nodes positive for malignancy.  Certainly this patient has at least stage IIIb rectal cancer (T3N1M0?)  On 1/22/2020 adjuvant chemotherapy FOLFOX 6 regimen initiated.   On 2/5/2022 cycle #2 was delayed secondary to neutropenia.  She was given 3 days of Granix.   Emend added with cycle 3.  With improved nausea control  Continuing home Granix x3 days following 5-FU disconnect  3/11/2020 due for cycle 4, however, she is experiencing progressive abdominal pain and occasional fevers.   CT scan performed on 3/13/2020 reveals no evidence of progressive or recurrent disease.  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  Patient hospitalized 4/24-4/26/20 after cycle 5 of chemotherapy with acute UTI.  CT abdomen/pelvis noting fluid collection in the presacral region having diminished in size  compared to CT dated 3/13/2020.  Patient was evaluated by Dr. Ye while in the hospital with further plans to evaluate possible colovaginal fistula following completion of chemotherapy.  Patient did respond to IV antibiotics and discharged home on oral cefdinir.  4/29/2020 cycle 6 of FOLFOX.  Urinary symptoms have resolved   Patient seen in the [facility] ED on 5/2/2020 for what was suspected to be an allergic reaction.  She called our service on Saturday explaining that she was experiencing hand and face swelling.  She was instructed to proceed to the emergency department at which time she was assessed and prescribed a Medrol Dosepak.  She had just completed her 5-FU and was unhooked on Friday, 5/1/2020.  Her symptoms have resolved in the office on assessment, 5/5/2020.  The patient had grade 3 hand-foot syndrome based on symptomology.  Patient had cycle #8 of 5-FU on 5/13/2020. Due to her symptoms and poor tolerance to the 5-FU, her treatment dose will be reduced 50% for oxaliplatin and infusional 5-FU.  Bolus 5-FU will be discontinued..  On 5/21/2020 patient had a PET scan and it showed no evidence of of metastatic disease in the neck, chest, abdomen or pelvis.  There was fluid accumulation/abscess.   On 5/27/2020 discussed with the patient that we can discontinue chemotherapy at this time.  She will follow-up with Dr. Ye to discuss a possibility to reverse the ileostomy.  We likely will obtain anther PET scan in 3 to 4 months for reassessment.    Patient was seen by Dr. Ye on 6/19/2019 for evaluation and to discuss possible take down of her ileostomy.  She is scheduled to have a gastrografin enema on 7/2/2020 to evaluate for a fistula, and then a colonoscopy on 7/15/2020, both done by Dr. Ye.  She states that based on the above imaging and procedure findings, she may have a more extensive surgery done or just have her ileostomy reversed, which would likely be done in August 2020.  This will all be  coordinated under the care of Dr. Ye.     CT scan from 09/09/2020 and also compared to her previous PET scan examination from 05/21/2020 and also the original PET scan from 08/09/2019.  The original PET scan there is a small right upper lobe pulmonary nodule 6 mm with SUV 5.6. So in the 05/2020 PET scan the nodule is still there but activity seems much less and this most recent CT scan examination also documented the preexisting small pulmonary nodule however there is a new left lower lobe pulmonary nodule 8 mm in size and I shared with the patient today this nodule is suspicious for metastatic disease. Laboratory studies reported minimal CEA level 1.32 on 09/09/2020. Liver function panel was only marginally abnormal with the ALT 45, the remaining is normal. However laboratory study today showed worsening AST 55, ALT 95 and alkaline phosphatase 143 but is still normal, total bilirubin 0.3.   Recommended to have repeat PET scan examination for assessment because the left lower lobe pulmonary nodule is too small to be biopsied, and if the PET scan reports hypermetabolic lesion, I recommended to have stereotactic radiation therapy. Explained to patient that she is not a really good candidate to have thoracotomy for resection because she still has unhealed wound in the abdomen. Stereotactic radiation therapy will likely be a more feasible choice.   PET scan examination obtained on 9/18/2020 showed metastatic disease.  It reported a few hypermetabolic pulmonary nodules, and some new small pulmonary nodules, all of them highly suspicious for metastatic disease.  She also has hypermetabolic activity in the abdomen and pelvic area which could be related to scar tissue from her recent surgery versus metastatic disease.  Nevertheless overall picture fits with metastatic cancer.  Discussed with the patient on 9/20/2020, we reviewed the PET scan images together, and I recommended to have systematic chemotherapy, I do not  think stereotactic reading therapy to the hypermetabolic lesions in the lungs warranted at this time because even those will be treated with reading therapy, she will still need systematic chemotherapy.  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  I did discuss with the patient using anti-EGFR monoclonal antibody versus anti-VEGF monoclonal antibody.     Palliative chemotherapy cycle#1 FOLFIRI was started on 10/13/2020.  No bolus 5-FU given considering previous poor tolerance.    NGS study from [facility] reported positive for [place] mutation.  Microsatellite stable, mutation burden 5/Mb.   Discussed with the patient 10/27/2020, because of the K-s[place] mutation, she is not a candidate for anti-EGFR monoclonal antibody such as Erbitux or vectibix.  She could be a candidate for anti-VEGF monoclonal antibody, however because of active wound, she is not a candidate right now at this moment.  Patient seen in the ED for acute right neck pain on 11/16/2020.  CT angiogram as well as CT of the cervical spine performed with no acute findings but notably one of her pulmonary nodules, left upper lobe, somewhat decreased in size.  Patient given prednisone pack.  Further details below.  Cycle #6 chemotherapy will be resumed on 1/5/2021.  Started back on original irinotecan.  PET scan examination obtained on 1/12/2021 after cycle #6 chemotherapy reported improved pulmonary nodule hypermetabolic activity.  Confirmed these are metastatic lesions.  Patient is evaluated 1/19/2020, will continue cycle #7 FOLFIRI chemotherapy.  However Avastin will be on hold see next section.   Patient was reevaluated on 2/2/2021, will continue chemotherapy cycle #8 FOLFIRI.  Avastin / biosimilar will be added.    She talked to [facility] specialist in mid February 2021, and had recommended palliative chemotherapy without surgical intervention of metastatic lung lesions.   On  3/2/2021, patient will proceed with cycle #10 FOLFIRI plus bevacizumab.  After 12 cycles, plan to start maintenance treatment.  3/16/2021 cycle 11.  Plus bevacizumab.  3/30/2021 cycle 12 FOLFIRI plus bevacizumab.  Having issues with worsening nausea despite premeds with dexamethasone, Aloxi, Emend, and Zofran at home.  Patient is requesting a dose of Phenergan, which is helped her in the past.  We will give this to her with treatment today.  PET scan examination on 4/6/2021 reported no evidence of hypermetabolic metastatic lesion.  Discussed with the patient on 4/13/2021, we reviewed the PET scan results.  We recommended to switch to maintenance chemotherapy without irinotecan.  We will continue 5-FU and Avastin every 2 weeks.  We discussed possibility of switching to oral Xeloda treatment.  Discussed with the patient for side effects more so with hand-foot syndrome.  Patient is agreeable.   Xeloda 2000 mg twice daily 7 days on, 7 days off along with Avastin initiated 4/27/2021.  After only 5 doses of Xeloda significant hand-foot syndrome, Xeloda held. Patient requesting to be transitioned back to infusional 5-FU, as she felt that she tolerated infusional 5-FU without any problem.  The patient will receive 5-FU leucovorin and Avastin every 2 weeks.  Xeloda discontinued.  5/25/2021 continued cycle #4 maintenance 5-FU, leucovorin, Avastin tolerating this much better so far, we will continue this. Recommended to have 12 cycles of maintenance chemotherapy, then obtain PET scan for reevaluation.  We could discuss further treatment plan at that time.  9/14/2021 patient due for cycle 12 of additional maintenance 5-FU/leucovorin plus Avastin.  She continues to tolerate treatment well overall.  She is anxious in the office today with her Mediport not getting blood at first and also with upcoming scans being heavy on her mind.  She was instructed to take one of her Klonopin pills while here to help calm her mind.  Heart rate  did improve from 140s down to 112.  Proceed with treatment today as scheduled.  PET scan examination on 9/24/2021 reported further shrinking of the right upper lobe tiny pulmonary nodule.  No other new lesions.  Discussed with patient on 9/24/2021 about further shrinking of the right upper lobe pulmonary nodule and no evidence for other new lesions. We recommended to have chemo break for 3 months with repeated CT scan for reevaluation.  Recent CT scan for chest 12/14/2021 and abdomen CT from 11/29/2021 reported no evidence for active disease. The right upper lobe pulmonary nodule, left lower lobe pulmonary nodule minimal about 4 mm and stable. I discussed with patient today on 12/21/2021, recommended to give her another 3 months chemotherapy holiday and obtain CT scan afterwards for reevaluation. After discussion, patient is agreeable.   CT scan examination for chest abdomen and pelvis obtained on 3/15/2022 reported a slight increase of the left upper lobe pulmonary nodule 5 mm, from previous 3 mm.  The other pulmonary nodules were stable in size.  There is also small left upper lobe groundglass changes.   Dr. Nguyen reviewed images studies with the patient 3/23/2022, compared to multiple previous images including CT chest CT and PET scan, suspected disease progression.  Discussed with the patient, and I recommended to resume maintenance chemotherapy with 5-FU plus leucovorin plus Avastin repeating every 2 weeks, and obtaining CT scan for reassessment in 3 months.  Patient is agreeable.  Patient resumed maintenance chemotherapy on 3/29/2022 with 5-FU leucovorin and Avastin.   4/26/2022, proceed with cycle #27 maintenance 5-FU, leucovorin, and Avastin.  Patient continues to tolerate treatment well.    On 5/10/2022, we will proceed ahead with cycle #28 maintenance chemotherapy.  Plan to have CT scan after cycle #30.  5/24/2022 cycle 29 maintenance chemotherapy.  Continue to tolerate well.  6/7/2022 cycle #30  maintenance chemotherapy, continuing to tolerate well.   CT scan for chest abdomen pelvis with IV contrast obtained on 6/21/2022 reported sub-6 mm pulmonary nodules either stable or slightly smaller.  We reviewed the images with the patient together.  We discussed with the patient and recommended to hold chemotherapy for now with repeating CT scan in 3 months for reassessment.  CT scan of the chest abdomen pelvis 9/13/2022 reported stable condition, no evidence for recurrent or metastatic colon cancer.  On 1/10/2023 patient had a CT chest abdomen pelvis with reported no evidence for disease progression.  Laboratory study also showed normal CEA.   Patient had a CT scan for chest, abdomen, and pelvis obtained on 04/11/2023. This study reported very small pulmonary nodules, the right upper lobe nodule is stable to 6 mm, however, the left upper lobe and the left lower lobe nodule increased to 5 mm from previous 4 mm. I discussed with the patient today on 04/19/2023, I recommend to obtain another CT scan in 3 months for reassessment. The nodules are so small, they likely will not be picked up by PET scan examination.  CT scan examination of the chest, abdomen, and pelvis obtained on 7/7/2023 reported stable small pulmonary nodules. No evidence for disease progression or new lesions in chest, abdomen, and pelvis.  Discussed with patient today on 7/14/2023, however, patient is concerning for disease progression. I recommended to obtain Guardant Reveal for circulating tumor DNA study. If the study is positive, we will further obtain PET scan examination, otherwise, we will obtain CT scan for chest, abdomen, and pelvis in 3 months for reassessment.  The patient had CT scan for the chest, abdomen, and pelvis obtained on 10/27/2023, which reported worsening pulmonary nodules at bilateral upper lobes. Laboratory studies showed low normal CEA level and normal liver function panel. The Guardant Reveal study is still pending. I  discussed this with the patient on 11/03/2023 and we shared the images, and I recommended having a PET scan examination for further assessment. I will present her case at the multimodality lung conference. If those lesions are deemed to be metastatic lesions, I recommend having stereotactic radiotherapy. I discussed it with the patient, and she voiced understanding and was agreeable with that strategy.  I presented her case at the multimodality chest conference 11/16/2023.  The consensus was for patient to receive stereotactic radiation therapy for the right upper lobe lung lesion, and the bigger left upper lobe lesion, and monitor the smaller left upper lobe lesion with further images studies down the line. Also, the conference recommended Guardant Reveal study which we already ordered. I discussed with the patient today on 11/17/2023, we explained to the patient that the opinion of the conference and she is agreeable with this and prefers this strategy because of limited toxicity compared to long term commitment to starting chemotherapy again. I recommend to obtain a CT scan for the chest, abdomen, and pelvis with both IV and oral contrast for reassessment in 3 months for reassessment of metastatic lung lesions and thickness in the pelvis where the PET scan reported a low activity SUV 2.4 in the surgical bed.   The patient had steroid-induced radiation therapy for the right upper lobe and one of the left upper lobe lesions.  Her CT scan examination on 02/02/2024 reported shrinking nodules of the right upper lobe and one of the left upper lobe lesions, both from 9 mm down to 6 mm. There are 2 stable pulmonary nodules 7 mm. Nothing new.  02/09/2024, I discussed with the patient and recommended CT scan for the chest, abdomen, and pelvis in 3 months for reassessment. Guardant Reveal study was 0% ctDNA. We will also continue laboratory studies every 3 months for Guardant Reveal, together with routine labs, CBC, CMP,  and CEA.  CT scan of the chest, abdomen, and pelvis conducted on 4/23/2024 revealed stable multiple pulmonary nodules, with no other new pulmonary nodules or evidence of disease recurrence or abnormal pelvis. The patient's liver function panel was normal, and low-level CEA level was also noted. The Guardant Reveal study was able to be obtained earlier due to regulatory rules, and we hugo obtain today the Guardant reveal study, be available within 10 to 14 days.   Given the patient's metastatic liver lesion, and lung lesions from colon cancer, careful monitoring is necessary. A chest CT scan with IV contrast will be arranged in 3 months for reevaluation. The study will be reviewed again in 3 months, which will include CBC, CMP, and CEA level.   Imaging study with chest CT scan on 08/02/2024 reported multiple bilateral pulmonary nodules that are relatively stable. However, the Guardant Reveal reported positive ctDNA indicating disease progression. A subsequent PET scan examination on 08/14/2024 reported newly developed hypermetabolic pulmonary nodules. The highest SUV is 3.7, corresponding to an 8 mm new right upper lobe nodule, and there are several other hypometabolic nodules in the lungs.   8/21/2024 resumption of chemotherapy with 5-FU bevacizumab  Triage visit 8/28/2024 with intractable diarrhea that was unclear if this is secondary to chemotherapy or infectious etiology given her known chronic colitis, fever and abdominal pain.  Further management as outlined below  9/4/2024 per review today diarrhea has improved though she is having some difficulty with passage of gas and stool,?  Related to significant inflammation in the rectum versus tumor obstruction.  The passage of gas has improved in the last few days but she does still have to change positions on the toilet to get stool to pass without it being painful.  Discussed all of this with Dr. Nguyen and we will refer the patient urgently back to Dr. Ye for at  the very least rectal examination in the office versus full repeated colonoscopy.  Because of the potential for examination or invasive procedures, we will hold bevacizumab today.  Because the patient had significant diarrhea last week and concerns that it may be related to chemotherapy we will decrease her 5-FU/leucovorin today by 30%.  If she does well we can go back to full dose with cycle 3.     9/18/2024 she presented for evaluation prior to cycle #3 of palliative chemotherapy with 5-FU, leucovorin, and bevacizumab. Given her recent biopsy on 09/11/2024, it is prudent to postpone the Avastin treatment today to ensure safety.  Chemotherapy will be proceeded.      She presented for evaluation on 10/02/2024, tolerating chemotherapy except for diarrhea. This has been managed with octreotide. Proceed with cycle 4 chemotherapy today with 5-FU, leucovorin and resumption of bevacizumab.   Reevaluation 10/16/2024 due for cycle 5 5-FU, leucovorin, bevacizumab.  Due to ongoing significant diarrhea, bevacizumab will be placed on hold for potential surgical intervention.  Additionally, 5-FU will be dose reduced to 50%.  Additional adjustments as outlined below  Patient reviewed 10/30/2024 with improved tolerance to cycle five 5-FU, leucovorin.  We we will attempt to slightly escalate 5-FU dosing, increasing by 10% (40% dose reduction)  Patient did experience leakage of 5-FU, and return 10/31/2024 for evaluation of this.  She was sent for port dye study that showed correct location of her port, so 5-FU was resumed.  The patient's right rib pain is likely due to a new chair on the bone, as indicated by the PET scan. The PET scan revealed a new 2.8 x 1.3 cm groundglass opacity in the lateral aspect of the left apex with SUV 6.3. This appearance is nonspecific and may represent an infectious or inflammatory infiltrate. Continued monitoring and follow-up imaging are recommended.  The current regimen of 5-FU will be maintained,  and Avastin will be reintroduced with full dose. 11/13/2024 will proceed ahead with 5-FU, leucovorin and bevacizumab.   12/4/2024 She also reports two bad days of diarrhea, likely due to Avastin. The dosage of Avastin will be reduced to 4 mg/kg. The 5-FU dosage will be increased to 1680 mg/m².   12/18/2024 proceed with 5-FU and Avastin continuing previous dose reductions as she had excellent tolerance to treatment 2 weeks ago.   Today 12/31/2024 patient reports tolerating chemotherapy with the same dose adjustment.  This is a 1 day early because of the closure of the New Year's day holiday tomorrow.  Next cycle will be resumed in 15 days back to her usual schedule.  Will plan to have CT scan for chest abdomen pelvis with IV contrast in early February 2024.    2.  Intractable diarrhea due to chemotherapy.   Patient developed diarrhea on Saturday, 8/24/2024.  Is unclear if this is related to infection as she did have fevers at the time the diarrhea began or chemotherapy.  She does have chronic colitis noted on most recent PET scan, this therefore could be diverticulitis flare  GI panel and C. difficile negative  8/29/2024 she did receive a single dose of octreotide  Begin empiric Flagyl 500 mg 3 times daily x 10 days  8/30/2024 discussed negative stool studies.  We will add Levaquin to already prescribed Flagyl to complete management of diverticulitis.  It is unclear if the patient's symptoms are related to diverticular flare given her previous abdominal pain and fever versus chemotherapy.  However, her symptoms are currently improved  9/4/2024 diarrhea has resolved.  Stools are now more soft with some form but she is having difficulty with passage of stool, having to change positions on the toilet to avoid pain.  We are referring back to Dr. Ye as detailed above.  She will finish out the Flagyl and Levaquin as prescribed having roughly 3 days left of both.  We will also adjust doses of 5-FU/leucovorin today with  concern for potential chemotherapy-induced diarrhea.  If she does well this cycle we can increase back to full dose with cycle 3 per Dr. Nguyen.  On 9/18/2024 patient reports that she experienced significant diarrhea during cycle #2 chemotherapy on 09/04/2024, leading to a 30% dose reduction. Despite taking Lomotil 2 tablets four times a day, Imodium, and Pepto-Bismol, her diarrhea persisted. She received octreotide shots twice, which improved her symptoms from watery to mushy stools but did not fully resolve the issue. She will continue with the current regimen and consider adding octreotide to her home regimen if diarrhea starts at home. The potential side effects of octreotide, including nausea, were discussed.   10/1/2024 she reports using octreotide self-injection at home, which has significantly improved her diarrhea. Most of the time, she only requires it once a day and occasionally twice a day, depending on the severity of her diarrhea. She is satisfied with the results, which have improved her quality of life and ability to eat properly.   She will also use Lomotil and the Imodium as needed.  Patient is very tearful in the office 10/16/2024 regarding debilitating diarrhea and interference with quality of life.  She questions potential surgical intervention and placement of colostomy due to ongoing pain in her rectum and burning of her skin from diarrhea.  We discussed adjustments today including increased dose of octreotide to 200 mcg 3 times daily for diarrhea.  Chemotherapy dose adjustments.  Diarrhea was slightly improved following most recent cycle of chemotherapy.  Continue supportive care  12/4/2024 patient reports that Avastin may be the agent causing her diarrhea.  So we will decrease dose by 20% and to see how things going.  Improved diarrhea with dose reduction to Avastin.  Continue previous dosing.   12/31/2024 She reports no significant diarrhea and continues to use octreotide three times a day,  which appears to be effective.      3.   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  Low-dose Lovenox discontinued and initiated Xarelto 20 mg daily.  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  Patient held Xarelto for 2 days prior to MediPort removal from the left chest wall and placement of new port in the right chest wall on 12/18/2020.  She resumed Xarelto that evening.  Progressive swelling in the neck, face, upper extremities prompting hospitalization with CT scan showing thrombosis in the right subclavian vein and SVC.  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  Although she had been off of Xarelto for a few days, clearly Xarelto was not prohibiting progression of her thrombosis after she resumed treatment and there was some evidence to suggest thrombosis had been present at least in the innominate vein on prior scan in September but now appears chronic.  Current acute thrombosis involving SVC and right subclavian.  Patient was admitted and placed on heparin drip  Patient was evaluated by vascular surgery and intervention was not recommended for thrombolysis/thrombectomy.  On 12/22/2020, the patient developed worsening shortness of breath, increasing upper extremity edema consistent with worsening SVC syndrome.  Repeat CT angiogram chest was performed early on 12/23/2020 with findings of stable SVC and brachiocephalic vein thrombosis, no  evidence of pulmonary embolism.  Symptoms improved and patient was discharged 12/24/2020 on Lovenox 100 mg every 12 hours.    Returns 12/29/2020 for evaluation continuing Lovenox, overall tolerating it well.  No bleeding issues.  Improved edema 1/5/2021.  Will continue Lovenox weight-based every 12 hours.  On 1/19/2021 and 2/2/2021, patient reports improved facial swelling.  She however does have being bruise on her abdomen wall where she does Lovenox injection.  However she does not have a spontaneous epistaxis, gum bleeding or other bleeding.   On 2/2/2021, we discussed that side effects of Avastin/biosimilar related to thrombosis.  Since this patient already has been on Lovenox, I think the benefits from treatment for her cancer will outweigh that risk of thrombosis, will proceed ahead with Avastin.  I cautioned patient to watch out for signs of worsening thrombosis patient voiced understanding.   5/11/2021 Lovenox dosing will be adjusted due to patient's weight loss.  We discussed she needs 1 mg/kg.  Her syringes are 100 mg syringes she will do 0.9 mL subcu every 12 hours.  8/3/2021 Patient continues to have bruising on abdomen from lovenox injections.  Will need to monitor weight and adjust dosing in future if further weight loss.   Stable condition on 9/28/2021.  Continue Lovenox anticoagulation.    Patient reports no chest pain no dyspnea no leg edema. However she had bruising and also most recently abnormal small abscess for which she actually went to ER on 11/29/2021, had I&D. The wound is healing. No further drainage. Denies fever sweating or chills. After discussion, she will continue Lovenox injection for now.   On 3/23/2022, patient reports good tolerance of Lovenox.  Nevertheless she still has small quantity of pus in the left lower abdomen abscess, she squeezes it every day to prevent it became worse.  She reports no fever sweating chills.  Recommended to start Augmentin 875 mg twice a day for 7 days.   She will continue Lovenox indefinitely.  On 4/12/2022, patient reports resolution of the small abscess at left lower abdominal wall.   On 5/10/2022, patient continues on Lovenox indefinitely.  No easy bleeding or bruising.  6/23/2022 continuing on Lovenox 1 mg/kg at a dose of 100 mg (patient weight is 96.6 kg today) every 12 hours.  On 1/17/2023, patient reports good tolerance, Lovenox will be continued.    Patient had a venous Doppler study on 02/05/2023, which reported acute left upper extremity DVT in the subclavian vein, axillary and the brachial vein, and also superficial thrombophlebitis in the basilic upper arm.   On 04/19/2023, patient reports that she was not compliant with anticoagulation at the time of recurrent DVT. She now is fully compliant and is using Lovenox.  Continues on anticoagulation without signs or symptoms of bleeding.  She does have occasional irritation and bleeding with wiping related to frequent diarrhea  Due to right sided pleuritic pain the patient was seen in the emergency department 10/22/2024 with a negative CT angiogram.  Without thrombosis.  Anticoagulation continued.  Patient had CTA again on 11/2/2024 due to continued pleuritic pain that was negative for PE.  12/31/2024 tolerating anticoagulation with Lovenox.  This will be continued.      4.  Mild anemia.   She also has history of iron deficiency.  Iron studies reveal iron saturation of 10%.  She was started on oral iron but was unable to tolerate due to GI side effects.   Status post Injectafer 2/5/2020 and 2/12/2020   Improved hemoglobin 13.5 on 1/5/2021.  3/16/2020 when hemoglobin now up to 16.2, hematocrit 47.6.  Patient admits that she has started smoking again, and I have encouraged her to quit.  She denies any snoring or sleep apnea diagnosis.  Patient is not taking oral iron.  We will closely monitor.  3/30/2020 hemoglobin 15.7, HCT 47.5.  Patient states that she has cut back significantly on her smoking, although not  quit yet.  I have encouraged her to continue working on this.  We will continue to closely monitor.  Hemoglobin 14.6 on 6/8/2021 at the meantime he has worsening macrocytosis .6.    Laboratory studies 6/22/2021 reported excellent folate > 20 ng/mL, however low normal B12 level 357 pg/mL.    On 7/6/2021, normal hemoglobin 15.8, .9 and HCT 47.2%.  Patient was asked to start oral vitamin B12 at 1000 mcg daily.   9/14/2021 hemoglobin 17.0, hematocrit 52.1.  Monitor.  On 9/28/2021 hemoglobin 16.1 hematocrit 48.5%, continue to monitor.   Lab study on 12/14/2021 reported excellent ferritin 296 and iron saturation 25% with free iron 104 TIBC 424. Hemoglobin was 15.2 with .2.   Normal hemoglobin 14.6 on 3/15/2022.  Iron study reported normal ferritin 129.5 ng/mL however slightly low iron saturation 11%.  Continue to monitor for now.  9/4/2024 hemoglobin is normal at 14.0  Hemoglobin is normal 14.2 on 12/18/2024.   12/31/2024 normal Hb 13.2.    5.  Peripheral neuropathy secondary to chemotherapy.   This is mild involving bilateral hands and feet as reported on 9/16/2020.   Will avoid chemotherapy with oxaliplatin.  Stable mild neuropathy as of 11/10/2020  Patient reports worsening peripheral neuropathy and cycle #5 chemotherapy on 12/8/2020 with and without irinotecan.   On 1/5/2021, patient reports improvement of peripheral neuropathy, will add irinotecan back to chemotherapy.  Continue to monitor and adjust medication.  Stable.      6.  Depression.  Patient seen by JULIET Davenport on 11/9/2020.  She was started on mirtazapine.  Lexapro was discontinued.  This has definitely improved her mood with the patient feeling overall much better.  She is sleeping better.  Appetite is improved with her actually eating more than she wants to.    Condition is stable.  She plans to continue follow-up with supportive oncology.      7. Malfunction of the portal catheter  Patient reports there was no blood drawn  from the portal catheter. CT scan of the chest on 7/7/2023 reported occlusion of the SVC around to the Port-A-Cath tubing. I recommend trying activase to see if it helps.  Otherwise, we may have to remove the catheter. Clinically, patient has no signs of SVC syndrome.  On 11/03/2023, the patient reports that her Port-A-Cath was able to be used for a CT scan for the injection of intravenous dye; however, there was no blood return, so we needed to draw from her arm for the blood test. We will continue to keep Port-A-Cath for now.  On 02/09/2024, the patient reports that the port was able to be flushed. However, no blood was returned. We will continue to flush every 6 weeks.  9/4/2024 she continues to have no blood return from her port but can taste saline as we flush at each time and is functioning well otherwise.  Monitor.  10/16/2024 no specific problem.  Port dye study 10/31/2024 showing correct position of port.  Fibrin sheath present.  No issue today.    8. Internal hemorrhoids.  She has large internal hemorrhoids diagnosed during a flexible sigmoidoscopy on 09/11/2024. Hydrocortisone cream was prescribed for treatment.   Not active problem today.    9.  This patient also has strong family history for malignancy   The patient had appointment with genetic counseling on September 3, 2019.  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    10. Hypertension.  Continue management of hypertension and follow up with PCP.      11.  Chest and back pain  Ports this has been ongoing for about 4 weeks.  Started in her right chest/rib cage.  Originally this pain had completely resolved, however returned when she received her next chemotherapy infusion. She felt like the pain worsened after she was unhooked from her 5-FU and that the pain spread to her right scapular/back area.  Since unhook she has continued with pain in her right chest/back that has required her to take pain medicine regularly which is not typical for her.  She  has pain with deep inspiration and pain with certain movements.  She also has pain when she pushes on the tissue around her port, though there is no redness or warmth around her port site aside from red rash in the shape of a bandaid and she reports being sensitive to bandaids.  Reviewed all of patients symptoms with Dr. Nguyen who recommended obtaining doppler right upper extremity and PET scan for further evaluation of this severe right sided pain that has been evaluated by CTA without any signs of what could be causing her pain.  Of note she was seen in the ED 11/2/2024, had CT chest performed which was negative for PE.  EKG and troponin looked okay.  She was discharged home.    Healing fracture of the right 2nd rib. The PET scan on 11/12/2024 showed new hypermetabolic activity at a healing fracture on the anterior aspect of the right second rib with SUV 5.7. The patient should avoid activities that may exacerbate the pain and consider pain management options as needed.    12/4/2024 the patient reports improvement in rib pain, with the ability to lay on the affected side, though still experiencing some soreness and pain upon sneezing or deep breathing.    Not active problem today.        PLAN:    Proceeded today with palliative chemotherapy 5-FU leucovorin and bevacizumab.  Avastin will be maintained at 4 mg/kg, and 5-FU will be maintained at 1680 mg/m².   Patient return in 2 days to discontinue 5-FU infusion.  Continue octreotide, currently utilizing 100 mcg every 8 hours following chemotherapy  Continue Imodium and Lomotil as needed.  Continue Protonix 40 mg daily.   Continue Gas-X.   Continue Magic barrier cream rectum as needed   Continue anticoagulation with Lovenox subcu injection every 12 hours.   I will see patient in 15 days for next cycle chemotherapy, returning to her usual date of chemotherapy on Wednesdays.   Patient will see APRN every 2 weeks afterwards with CBC CMP and ongoing chemotherapy 5-FU  leucovorin and bevacizumab.  Will check Guardant Reveal study on 1/29/2025 on day of her chemotherapy.  Will arrange patient have a CT scan for chest abdomen pelvis with IV contrast in early February 2025 for reassessment.  I will see her again 2/12/2025 to discuss results of laboratory studies and CT scan images, and to discuss further management plan.        The patient is on a high risk medication requiring close monitoring for toxicity.     I spent 45 minutes caring for Carole on this date of service. This time includes time spent by me in the following activities: preparing for the visit, reviewing tests, obtaining and/or reviewing a separately obtained history, performing a medically appropriate examination and/or evaluation, counseling and educating the patient/family/caregiver, ordering medications, tests, or procedures, referring and communicating with other health care professionals, documenting information in the medical record, independently interpreting results and communicating that information with the patient/family/caregiver and care coordination           Dong Nguyen MD PhD  12/31/2024        CC:  Deepika Vela III NP-C   MD Alverto Bowers MD

## 2025-01-02 ENCOUNTER — INFUSION (OUTPATIENT)
Dept: ONCOLOGY | Facility: HOSPITAL | Age: 45
End: 2025-01-02
Payer: COMMERCIAL

## 2025-01-02 DIAGNOSIS — G89.3 CANCER ASSOCIATED PAIN: ICD-10-CM

## 2025-01-02 DIAGNOSIS — Z45.2 ENCOUNTER FOR FITTING AND ADJUSTMENT OF VASCULAR CATHETER: Primary | ICD-10-CM

## 2025-01-02 PROCEDURE — 25010000002 HEPARIN LOCK FLUSH PER 10 UNITS: Performed by: INTERNAL MEDICINE

## 2025-01-02 RX ORDER — HEPARIN SODIUM (PORCINE) LOCK FLUSH IV SOLN 100 UNIT/ML 100 UNIT/ML
500 SOLUTION INTRAVENOUS AS NEEDED
OUTPATIENT
Start: 2025-01-02

## 2025-01-02 RX ORDER — SODIUM CHLORIDE 0.9 % (FLUSH) 0.9 %
10 SYRINGE (ML) INJECTION AS NEEDED
OUTPATIENT
Start: 2025-01-02

## 2025-01-02 RX ORDER — HYDROCODONE BITARTRATE AND ACETAMINOPHEN 5; 325 MG/1; MG/1
1 TABLET ORAL EVERY 6 HOURS PRN
Qty: 60 TABLET | Refills: 0 | Status: SHIPPED | OUTPATIENT
Start: 2025-01-02

## 2025-01-02 RX ORDER — HEPARIN SODIUM (PORCINE) LOCK FLUSH IV SOLN 100 UNIT/ML 100 UNIT/ML
500 SOLUTION INTRAVENOUS AS NEEDED
Status: DISCONTINUED | OUTPATIENT
Start: 2025-01-02 | End: 2025-01-02 | Stop reason: HOSPADM

## 2025-01-02 RX ORDER — SODIUM CHLORIDE 0.9 % (FLUSH) 0.9 %
10 SYRINGE (ML) INJECTION AS NEEDED
Status: DISCONTINUED | OUTPATIENT
Start: 2025-01-02 | End: 2025-01-02 | Stop reason: HOSPADM

## 2025-01-02 RX ADMIN — Medication 500 UNITS: at 11:09

## 2025-01-02 RX ADMIN — Medication 10 ML: at 11:08

## 2025-01-07 RX ORDER — FAMOTIDINE 20 MG/1
TABLET, FILM COATED ORAL
Qty: 180 TABLET | Refills: 2 | Status: SHIPPED | OUTPATIENT
Start: 2025-01-07

## 2025-01-07 RX ORDER — ROSUVASTATIN CALCIUM 5 MG/1
5 TABLET, COATED ORAL DAILY
Qty: 90 TABLET | Refills: 0 | Status: SHIPPED | OUTPATIENT
Start: 2025-01-07

## 2025-01-07 RX ORDER — HYDROCORTISONE 25 MG/G
CREAM TOPICAL
Qty: 30 G | Refills: 1 | Status: SHIPPED | OUTPATIENT
Start: 2025-01-07

## 2025-01-09 DIAGNOSIS — K59.1 FUNCTIONAL DIARRHEA: ICD-10-CM

## 2025-01-10 RX ORDER — DICYCLOMINE HCL 20 MG
TABLET ORAL
Qty: 360 TABLET | Refills: 2 | Status: SHIPPED | OUTPATIENT
Start: 2025-01-10

## 2025-01-10 NOTE — TELEPHONE ENCOUNTER
Rx Refill Note  Requested Prescriptions     Pending Prescriptions Disp Refills    dicyclomine (BENTYL) 20 MG tablet [Pharmacy Med Name: DICYCLOMINE 20 MG TABLET] 360 tablet 2     Sig: TAKE 1 TABLET BY MOUTH EVERY 6 (SIX) HOURS AS NEEDED FOR IRRITABLE BOWEL SYMPTOMS      Last office visit with prescribing clinician: Visit date not found   Last telemedicine visit with prescribing clinician: Visit date not found   Next office visit with prescribing clinician: Visit date not found       Eliz Duggan MA  01/10/25, 09:54 EST

## 2025-01-15 ENCOUNTER — INFUSION (OUTPATIENT)
Dept: ONCOLOGY | Facility: HOSPITAL | Age: 45
End: 2025-01-15
Payer: COMMERCIAL

## 2025-01-15 ENCOUNTER — OFFICE VISIT (OUTPATIENT)
Dept: ONCOLOGY | Facility: CLINIC | Age: 45
End: 2025-01-15
Payer: COMMERCIAL

## 2025-01-15 VITALS
OXYGEN SATURATION: 96 % | WEIGHT: 189.5 LBS | SYSTOLIC BLOOD PRESSURE: 112 MMHG | RESPIRATION RATE: 17 BRPM | BODY MASS INDEX: 31.57 KG/M2 | HEIGHT: 65 IN | DIASTOLIC BLOOD PRESSURE: 75 MMHG | TEMPERATURE: 98.2 F | HEART RATE: 79 BPM

## 2025-01-15 DIAGNOSIS — Z79.899 ENCOUNTER FOR LONG-TERM (CURRENT) USE OF HIGH-RISK MEDICATION: ICD-10-CM

## 2025-01-15 DIAGNOSIS — C78.00 MALIGNANT NEOPLASM METASTATIC TO LUNG, UNSPECIFIED LATERALITY: ICD-10-CM

## 2025-01-15 DIAGNOSIS — C20 ADENOCARCINOMA OF RECTUM: Primary | ICD-10-CM

## 2025-01-15 DIAGNOSIS — C20 ADENOCARCINOMA OF RECTUM: ICD-10-CM

## 2025-01-15 DIAGNOSIS — G89.3 CANCER ASSOCIATED PAIN: ICD-10-CM

## 2025-01-15 DIAGNOSIS — I26.99 PULMONARY EMBOLISM WITHOUT ACUTE COR PULMONALE, UNSPECIFIED CHRONICITY, UNSPECIFIED PULMONARY EMBOLISM TYPE: ICD-10-CM

## 2025-01-15 DIAGNOSIS — Z79.899 ENCOUNTER FOR LONG-TERM (CURRENT) USE OF HIGH-RISK MEDICATION: Primary | ICD-10-CM

## 2025-01-15 DIAGNOSIS — Z79.01 CHRONIC ANTICOAGULATION: ICD-10-CM

## 2025-01-15 DIAGNOSIS — Z79.899 HIGH RISK MEDICATION USE: ICD-10-CM

## 2025-01-15 DIAGNOSIS — K52.1 CHEMOTHERAPY INDUCED DIARRHEA: ICD-10-CM

## 2025-01-15 DIAGNOSIS — T45.1X5A CHEMOTHERAPY INDUCED DIARRHEA: ICD-10-CM

## 2025-01-15 LAB
ALBUMIN SERPL-MCNC: 3.4 G/DL (ref 3.5–5.2)
ALBUMIN/GLOB SERPL: 1.3 G/DL
ALP SERPL-CCNC: 82 U/L (ref 39–117)
ALT SERPL W P-5'-P-CCNC: 16 U/L (ref 1–33)
ANION GAP SERPL CALCULATED.3IONS-SCNC: 10.2 MMOL/L (ref 5–15)
AST SERPL-CCNC: 23 U/L (ref 1–32)
BASOPHILS # BLD AUTO: 0.02 10*3/MM3 (ref 0–0.2)
BASOPHILS NFR BLD AUTO: 0.4 % (ref 0–1.5)
BILIRUB SERPL-MCNC: 0.2 MG/DL (ref 0–1.2)
BILIRUB UR QL STRIP: NEGATIVE
BUN SERPL-MCNC: 5 MG/DL (ref 6–20)
BUN/CREAT SERPL: 8.6 (ref 7–25)
CALCIUM SPEC-SCNC: 8.7 MG/DL (ref 8.6–10.5)
CHLORIDE SERPL-SCNC: 106 MMOL/L (ref 98–107)
CLARITY UR: CLEAR
CO2 SERPL-SCNC: 25.8 MMOL/L (ref 22–29)
COLOR UR: YELLOW
CREAT SERPL-MCNC: 0.58 MG/DL (ref 0.57–1)
DEPRECATED RDW RBC AUTO: 53.8 FL (ref 37–54)
EGFRCR SERPLBLD CKD-EPI 2021: 113.9 ML/MIN/1.73
EOSINOPHIL # BLD AUTO: 0.14 10*3/MM3 (ref 0–0.4)
EOSINOPHIL NFR BLD AUTO: 2.6 % (ref 0.3–6.2)
ERYTHROCYTE [DISTWIDTH] IN BLOOD BY AUTOMATED COUNT: 14.8 % (ref 12.3–15.4)
GLOBULIN UR ELPH-MCNC: 2.6 GM/DL
GLUCOSE SERPL-MCNC: 151 MG/DL (ref 65–99)
GLUCOSE UR STRIP-MCNC: NEGATIVE MG/DL
HCT VFR BLD AUTO: 41.9 % (ref 34–46.6)
HGB BLD-MCNC: 14 G/DL (ref 12–15.9)
HGB UR QL STRIP.AUTO: ABNORMAL
IMM GRANULOCYTES # BLD AUTO: 0.01 10*3/MM3 (ref 0–0.05)
IMM GRANULOCYTES NFR BLD AUTO: 0.2 % (ref 0–0.5)
KETONES UR QL STRIP: NEGATIVE
LEUKOCYTE ESTERASE UR QL STRIP.AUTO: ABNORMAL
LYMPHOCYTES # BLD AUTO: 1.09 10*3/MM3 (ref 0.7–3.1)
LYMPHOCYTES NFR BLD AUTO: 19.9 % (ref 19.6–45.3)
MCH RBC QN AUTO: 33.1 PG (ref 26.6–33)
MCHC RBC AUTO-ENTMCNC: 33.4 G/DL (ref 31.5–35.7)
MCV RBC AUTO: 99.1 FL (ref 79–97)
MONOCYTES # BLD AUTO: 0.48 10*3/MM3 (ref 0.1–0.9)
MONOCYTES NFR BLD AUTO: 8.8 % (ref 5–12)
NEUTROPHILS NFR BLD AUTO: 3.73 10*3/MM3 (ref 1.7–7)
NEUTROPHILS NFR BLD AUTO: 68.1 % (ref 42.7–76)
NITRITE UR QL STRIP: NEGATIVE
NRBC BLD AUTO-RTO: 0 /100 WBC (ref 0–0.2)
PH UR STRIP.AUTO: 7 [PH] (ref 4.5–8)
PLATELET # BLD AUTO: 171 10*3/MM3 (ref 140–450)
PMV BLD AUTO: 10 FL (ref 6–12)
POTASSIUM SERPL-SCNC: 3.6 MMOL/L (ref 3.5–5.2)
PROT SERPL-MCNC: 6 G/DL (ref 6–8.5)
PROT UR QL STRIP: NEGATIVE
RBC # BLD AUTO: 4.23 10*6/MM3 (ref 3.77–5.28)
SODIUM SERPL-SCNC: 142 MMOL/L (ref 136–145)
SP GR UR STRIP: 1.01 (ref 1–1.03)
UROBILINOGEN UR QL STRIP: ABNORMAL
WBC NRBC COR # BLD AUTO: 5.47 10*3/MM3 (ref 3.4–10.8)

## 2025-01-15 PROCEDURE — 25010000002 BEVACIZUMAB PER 10 MG: Performed by: NURSE PRACTITIONER

## 2025-01-15 PROCEDURE — 80053 COMPREHEN METABOLIC PANEL: CPT

## 2025-01-15 PROCEDURE — 81003 URINALYSIS AUTO W/O SCOPE: CPT

## 2025-01-15 PROCEDURE — 96413 CHEMO IV INFUSION 1 HR: CPT

## 2025-01-15 PROCEDURE — 25010000002 FOSAPREPITANT PER 1 MG: Performed by: NURSE PRACTITIONER

## 2025-01-15 PROCEDURE — 96367 TX/PROPH/DG ADDL SEQ IV INF: CPT

## 2025-01-15 PROCEDURE — 96375 TX/PRO/DX INJ NEW DRUG ADDON: CPT

## 2025-01-15 PROCEDURE — 25010000002 PALONOSETRON PER 25 MCG: Performed by: NURSE PRACTITIONER

## 2025-01-15 PROCEDURE — 25010000002 LEUCOVORIN CALCIUM PER 50 MG: Performed by: NURSE PRACTITIONER

## 2025-01-15 PROCEDURE — 25010000002 DEXAMETHASONE SODIUM PHOSPHATE 100 MG/10ML SOLUTION: Performed by: NURSE PRACTITIONER

## 2025-01-15 PROCEDURE — 25010000002 FLUOROURACIL PER 500 MG: Performed by: NURSE PRACTITIONER

## 2025-01-15 PROCEDURE — G0498 CHEMO EXTEND IV INFUS W/PUMP: HCPCS

## 2025-01-15 PROCEDURE — 25810000003 SODIUM CHLORIDE 0.9 % SOLUTION 250 ML FLEX CONT: Performed by: NURSE PRACTITIONER

## 2025-01-15 PROCEDURE — 85025 COMPLETE CBC W/AUTO DIFF WBC: CPT

## 2025-01-15 RX ORDER — PALONOSETRON 0.05 MG/ML
0.25 INJECTION, SOLUTION INTRAVENOUS ONCE
Status: COMPLETED | OUTPATIENT
Start: 2025-01-15 | End: 2025-01-15

## 2025-01-15 RX ORDER — SODIUM CHLORIDE 9 MG/ML
20 INJECTION, SOLUTION INTRAVENOUS ONCE
Status: CANCELLED | OUTPATIENT
Start: 2025-01-15

## 2025-01-15 RX ORDER — PALONOSETRON 0.05 MG/ML
0.25 INJECTION, SOLUTION INTRAVENOUS ONCE
Status: CANCELLED | OUTPATIENT
Start: 2025-01-15

## 2025-01-15 RX ADMIN — PALONOSETRON HYDROCHLORIDE 0.25 MG: 0.25 INJECTION INTRAVENOUS at 11:27

## 2025-01-15 RX ADMIN — FOSAPREPITANT 100 ML: 150 INJECTION, POWDER, LYOPHILIZED, FOR SOLUTION INTRAVENOUS at 11:44

## 2025-01-15 RX ADMIN — FLUOROURACIL 3360 MG: 50 INJECTION, SOLUTION INTRAVENOUS at 13:22

## 2025-01-15 RX ADMIN — BEVACIZUMAB 360 MG: 400 INJECTION, SOLUTION INTRAVENOUS at 12:17

## 2025-01-15 RX ADMIN — LEUCOVORIN CALCIUM 560 MG: 350 INJECTION, POWDER, LYOPHILIZED, FOR SUSPENSION INTRAMUSCULAR; INTRAVENOUS at 12:50

## 2025-01-15 RX ADMIN — DEXAMETHASONE SODIUM PHOSPHATE 12 MG: 10 INJECTION, SOLUTION INTRAMUSCULAR; INTRAVENOUS at 11:27

## 2025-01-15 NOTE — PROGRESS NOTES
REASON FOR FOLLOW UP:     Rectal cancer, rectal ultrasound examination in July 2019 reported T3N0 disease without lymphadenopathy. She does have small pulmonary nodule 6-7 mm and 2 mm with indeterminate feature. There is no solid evidence of metastatic disease otherwise. Patient has stage IIA (T3N0M0) disease.  The patient is heterozygous factor V Leiden, was on prophylactic anticoagulation with Lovenox 40 mg daily given her increased risk of thrombosis with MediPort and GI malignancy.   PET scan on 8/8/2019 reported an 8 mm hypermetabolic right upper lobe pulmonary nodule, which is suspicious for metastatic as well.    Patient had a PowerPort placement on the left upper chest by Dr. Joseph on 8/9/2019.  Patient was started on concurrent infusional 5-FU chemoradiation therapy on 8/12/2019, with planned complete surgical resection and further adjuvant chemotherapy with intention to cure the disease.   Patient underwent surgical resection of the primary rectal cancer by Dr. Ye on 12/2/2019.  Pathology evaluation reported residual T3N1 disease stage IIIb.  Diarrhea related to therapy and radiation.   Patient was started cycle 1 day 1 of adjuvant FOLFOX 6 on 1/23/2020.  On 2/5/2020 FOLFOX 6 cycle 2 delayed secondary to neutropenia.  Patient was given 3 days of Granix injection.  After cycle #2 we planned 3 days of Granix with each cycle.   Patient also intolerant of oral iron.  Patient received 2 doses of IV injectafer on 02/05/2020 and 02/12/2020.   02/12/2020 Proceed with cycle #2 of FOLFOX 6 with 3 days of Granix.  On 3/11/2020 cycle 4 postponed secondary to abdominal pain and occasional low-grade fevers.  CT scans ordered.  Cycle #4 resumed after CT scan revealed no evidence of disease.  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 Ye. patient has no fever.  Continue to observe.   Grade 3 hand-foot syndrome secondary to 5-FU after cycle #7  FOLFOX 6 chemotherapy, prompting ER visit.  Also has worsening peripheral neuropathy.   Cycle #8 FOLFOX 6 was given on 5/13/2020, with 50% dose reduction for both 5-FU and oxaliplatin, and also examination of bolus 5-FU.   PET scan examination on 5/21/2020 reported no evidence of metastatic disease in the chest abdomen pelvis.  Stable 6 mm RUL pulmonary nodule.  On 5/27/2020, I discussed with the patient that we can discontinue chemotherapy at this time.   Patient had a surgical procedure for low anterior colon resection, coloanal anastomosis on 8/3/2020.  CT scan for chest abdomen pelvis on 9/9/2020 reported a new 8 mm noncalcified pulmonary nodule in the left lower lobe of the lung.  Otherwise stable right upper lobe 6 mm pulmonary nodule, and no evidence of disease recurrence in the abdomen or pelvis.  PET scan examination on 9/18/2020 reported multiple hypermetabolic small pulmonary nodules/ new pulmonary nodules.   Patient was started on pelvic chemotherapy with FOLFIRI regimen on 10/13/2020.   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 It reported tumor mutation burden 5 Muts/Mb, microsatellite stable, TP53 mutation R282W, and others.   Cycle #5 was without irinotecan, due to peripheral neuropathy.  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  Patient was switched to weight-based Lovenox injection.  Cycle #6 5-FU and irinotecan was delayed by 2 weeks because of the above incident.  Patient had a telemedicine evaluation at that the [facility] [place] in February 2021.  They agreed with our treatment plan for palliative chemotherapy followed by maintenance chemotherapy.    PET scan examination on 4/6/2021 after cycle #12 palliative chemotherapy reported no evidence of hypermetabolic metastatic lesion.   Patient was started on maintenance chemotherapy with 5-FU and  Avastin on 4/13/2021. (Unable to tolerate Xeloda because of a significant hand-foot syndrome).   PET scan on 9/24/2021 obtained after cycle #12 maintenance chemotherapy with 5-FU, leucovorin, Avastin reported no evidence for active disease. We recommended 3 months chemotherapy holiday.  CT scan examination on 3/15/2022 reported slightly disease progression with increase in size of small pulmonary nodule.  We started patient back on maintenance chemotherapy on 3/29/2022 treatment with 5-FU plus leucovorin and Avastin, repeating every 2 weeks.  After 6 more maintenance chemotherapy, CT scan for chest abdomen pelvis was done on 6/21/2022 which reported stable sub-6 mm pulmonary nodules.  Patient had last maintenance chemotherapy on 6/7/2022.   Disease progression, patient was restarted back on chemotherapy with 5-FU leucovorin and bevacizumab 8/21/2024      HISTORY OF PRESENT ILLNESS:  The patient is a 44 y.o. female with the above-mentioned history, who is seen today for evaluation.     Returns to the office today, 1/15/2025 in anticipation of 5-FU bevacizumab.  She reports following her most recent cycle of treatment she did have a day of diarrhea which significantly exacerbated her internal hemorrhoids resulting in fairly significant pain.  She has Norco to utilize as needed.  She requested a refill of her Magic barrier cream.  She continues to utilize octreotide.  She reports while this 1 day was quite bad her symptoms did resolve.  She is willing to proceed with treatment today at previous dosing.      Past Medical History:   Diagnosis Date    Abdominopelvic abscess 08/12/2020    ADMITTED TO [facility]    Abnormal Pap smear of cervix 02/02/1998    JULIUS I    Abscess of abdominal wall 11/28/2012    SEEN AT [facility] ER    Anemia in pregnancy     Anxiety     Bilateral epiphora 04/2020    Bilateral hand swelling 05/02/2020    SEEN AT [facility] ER    Cervical lymphadenitis 06/06/2012    SEEN AT [facility] ER    Chemotherapy induced diarrhea  01/2021    Chemotherapy induced neutropenia 04/2020    Chemotherapy-induced nausea 02/2020    Chemotherapy-induced thrombocytopenia 05/2020    Chronic anticoagulation     Chronic diarrhea     Colon polyps     FOLLOWED BY DR. 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 ALEXANDRU HUNT    History of gestational diabetes     History of pre-eclampsia 1998    History of pre-eclampsia     History of radiation therapy 2019    FOLLOWED BY DR. 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    Perirectal abscess 08/12/2020    Pulmonary embolism without acute cor pulmonale 09/20/2019    X 3; ADMITTED TO  [facility]    Pulmonary nodule, right 2020    Rectal cancer 2019    STAGE IIA, INVASIVE MODERATELY DIFFERENTIATED ADENOCARCINOMA, CHEMO AND XRT FINISHED 2019    Right shoulder pain 2020    SEEN AT [facility] ER    Right ureteral stone 10/01/2019    SEEN AT [facility] ER    SAB (spontaneous ) 1996     A2-1 INDUCED    Sciatica     Sepsis due to cellulitis 2002    LEFT GREAT TOE, ADMITTED TO [facility]    Tachycardia 2020    Thrombosis of superior vena cava 2020    AND BRACHIOCEPHALIC VEIN, ADMITTED TO [facility]    Urinary urgency 2020   Patient had COVID-19 infection diagnosed on 2022 despite was fully vaccinated and boosted.  She recovered without problem.      Past Surgical History:   Procedure Laterality Date    ABDOMINAL SURGERY      CHOLECYSTECTOMY N/A 10/10/2003    LAPAROSCOPIC WITH CHOLANGIOGRAM, DR. JAMEY TALAVERA AT [facility]    COLON RESECTION N/A 2019    Procedure: laparoscopic low anterior colon resection with mobilization of splenic flexure and diverting loop ileostomy: ERAS;  Surgeon: Padmaja Esaprza MD;  Location: [facility];  Service: General    COLON RESECTION N/A 2020    Procedure: LOW ANTERIOR COLON RESECTION, COLOANAL ANASTOMOSIS, MOBILIZATION SPLENIC FLEXURE;  Surgeon: Padmaja Esparza MD;  Location: [facility] OR;  Service: General;  Laterality: N/A;    COLONOSCOPY N/A 07/15/2020    PATENT ANASTAMOSIS IN MID RECTUM, RESCOPE IN 1 YR, DR. PADMAJA ESPARZA AT [facility]    COLONOSCOPY N/A 2022    DIFFUSE AREA OF MODERATELY FRIABLE MUCOSA IN ENTIRE COLON , DIVERSION COLITIS, PATENT AND HEALTHY ANASTAMOSIS, DR. PADMAJA ESPARZA AT [facility]    COLONOSCOPY N/A 2023    6 MM SESSILE SERRATED ADENOMA POLYP IN DESCENDING, PATENT ANASTAMOSIS IN DISTAL RECTUM, RESCOPE IN 2 YRS, DR. PADMAJA ESPARZA AT [facility]    COLONOSCOPY W/ POLYPECTOMY N/A 2019    15 MM TUBULOVILLOUS ADENOMA POLYP IN HEPATIC FLEXURE, 20 MMTUBULOVILLOUS ADENOMA WITH HIGH GRADE DYSPLASIA IN RECTOSIGMOID, 4 CM  MASS IN MID RECTUM, PATH: INVASIVE MODERATELY DIFFERENTIATED ADENOCARCINOMA, DR. JENNIFER LI AT [facility]    DILATATION AND EVACUATION N/A 2009    ENDOSCOPY N/A 07/08/2019    LA GRADE A ESOPHAGITIS, GASTRITIS, ALL BIOPSIES BENIGN, DR. JENNIFER LI AT [facility]    ILEOSTOMY CLOSURE N/A 11/14/2022    Procedure: ILEOSTOMY TAKEDOWN;  Surgeon: Geronimo Ye MD;  Location: [facility] OR;  Service: General;  Laterality: N/A;    INCISION AND DRAINAGE PERIRECTAL ABSCESS N/A 08/14/2020    Procedure: INCISION AND DRAINAGE OF retrorectal dehiscence abcess with drain placement and irrigation;  Surgeon: Geronimo Ye MD;  Location: [facility] OR;  Service: General;  Laterality: N/A;    PAP SMEAR N/A 01/24/2014    SIGMOIDOSCOPY N/A 07/24/2019    INFILTRATIVE PARTIALLY OBSTRUCTING LARGE RECTAL CANCER, AREA TATOOED, DR. GERONIMO YE AT [facility]    SIGMOIDOSCOPY N/A 11/23/2019    AN ULCERATED PARTIALLY OBSTRUCTING MASS IN MID RECTUM, AREA TATTOOED, DR. GERONIMO YE AT [facility]    SIGMOIDOSCOPY N/A 07/22/2021    PATENT ANASTAMOSIS IN DISTAL RECTUM, RESCOPE IN 1 YR, DR. GERONIMO YE AT [facility]    SIGMOIDOSCOPY N/A 09/11/2024    PATCHY INFLAMMATION AND EDEMA IN DESCENDING, AREA BIOPSIED AND WAS BENIGN, PATENT AND HEALTHY ANASTAMOSIS, HEMORRHOIDS,RESCOPE(CY) 12/2025, DR. GERONIMO YE AT [facility]    TRANSRECTAL ULTRASOUND N/A 07/24/2019    Procedure: ULTRASOUND TRANSRECTAL;  Surgeon: Geronimo Ye MD;  Location: [facility] ENDOSCOPY;  Service: General    URETEROSCOPY LASER LITHOTRIPSY WITH STENT INSERTION Right 10/30/2019    DR. ESTUARDO BEASLEY AT [place]    VAGINAL DELIVERY N/A 09/18/1998    VENOUS ACCESS DEVICE (PORT) INSERTION Left 08/09/2019    Procedure: INSERTION VENOUS ACCESS DEVICE;  Surgeon: Sj Joseph MD;  Location: [facility] OR [facility];  Service: General    VENOUS ACCESS DEVICE (PORT) INSERTION N/A 12/18/2020    Procedure: INSERTION VENOUS ACCESS DEVICE right side, removal venous access device left side;  Surgeon:  Sj Joseph MD;  Location: [facility] OR [facility];  Service: General;  Laterality: N/A;    WISDOM TOOTH EXTRACTION Bilateral 1993       HEMATOLOGIC/ONCOLOGIC HISTORY:      The patient reports she has intermittent rectal bleeding and urgency, mucous with her stool, starting sometime in 2016. At that time she was referred to GI service, and was diagnosed of irritable bowel syndrome. Nevertheless she had worsening urgency for bowel movement, and had a couple of incidents losing control of stool. She was recently seen by Roland Thorpe MD again and had colonoscopy and EGD exam on 07/08/2019. She was found having a circumferential rectal mass. According to the procedure note, the patient had a fungating circumferential bleeding 4 cm mass in the middle rectum, at distance between 13 cm and 17 cm from the anus. Mass was causing partial obstruction. There were also colon polyps found at the hepatic flexure and also at the rectosigmoid junction 23 cm from the anus. Both were resected and retrieved. EGD examination reported grade A esophagitis at the GE junction and patchy discontinuous erythema and aggravation of the mucosa without bleeding in the stomach body. There is normal mucosa of the duodenum. Pathology evaluation from this procedure reported moderately differentiated adenocarcinoma involving the rectal mass. The rectal sigmoid junction polyp was tubular/tubulovillous adenoma with high grade dysplasia negative for invasive malignancy. The hepatic flexure polyp was also tubular/tubulovillous adenoma negative for high grade dysplasia or malignancy. The biopsy from the esophagus reports squamocolumnar mucosa with inflammatory changes suggestive of mild reflux esophagitis, negative for interstitial metaplasia. Gastric biopsy was benign and duodenal biopsy was also benign. There is no mention of H-pylori.     Patient was subsequently referred to colorectal surgeon Padmaja Ye MD for further evaluation. The patient had CT scan  examination for chest, abdomen and pelvis requested by Dr. Ye and were done on 07/13/2019. The study reported no evidence of lymphadenopathy in the abdomen and pelvis. There was wall thickening of the rectosigmoid junction. Normal spleen, pancreas, adrenal glands and kidneys. There was fatty liver infiltration but no focal lymphatic lesions. There was a small 6-7 mm noncalcified nodule in the right upper lobe and a tiny 3 mm subpleural nodule in the right middle lobe. No mediastinal or hilar lymphadenopathy.     Dr. Ye performed staging rectal ultrasound examination. This study reported tumor penetrating through the muscularis propria as T3 disease; there were no lymph nodes identified.    She had a hospitalization in late September 2019 because of newly discovered pulmonary emboli.  She was on prophylactic Lovenox prior to the incident of PE.  Now she is on full dose Xarelto anticoagulation.  Patient reports no further chest pain dyspnea.  She denies bleeding or bruising.  During her hospitalization, she was seen by Dr. Ye, who plans to have surgery 8 to 12 weeks after finishing radiation therapy.  She finished her radiation on 9/19/2019.     I noticed patient also presented to the emergency room on 10/1/2019 complaining of right flank area pain.  I reviewed the images studies and indeed she has a very small 1 to 2 mm obstructing kidney stone in the UV junction.  Patient is still symptomatic with some pains and dysuria.  She denies fever sweating or chills.    Laboratory study on 10/7/2019 reported normal CBC including hemoglobin 13.1, platelets 301,000, WBC 6170 and ANC 4900 lymphocytes 590 monocytes 480.      The nurse reported malfunction of port-a-catheter on 10/7/2019.  Port study on 10/8/2019 showed fibrin sheath around the distal aspect of the Mediport catheter in the SVC. This does not appear to hinder injection, but does prevent aspiration at this time.    Patient underwent surgical resection of  the colon on 12/2/2019 with Dr. Ye.  Pathology evaluation reported residual T3 disease, also 1 out of 14 lymph nodes positive for malignancy.  So this patient in either had at least stage IIIb disease (T3 N1 M0?).      Adjuvant chemotherapy FOLFOX 6 will be started on 1/22/2020.  Laboratory study reported iron saturation 10%, free iron 31 TIBC 319 and ferritin 168.  Her hemoglobin was 11.8, WBC 5600, and platelets 347,000.    Patient was here on 02/12/2020 for cycle 2 of her FOLFOX.  This is delayed x1 week secondary to neutropenia.  The patient ultimately received 3 days worth of Neupogen with recovery of her blood counts.  Of note, the patient struggled with significant nausea without any episodes of vomiting following cycle #1 of chemotherapy resulting in significant weight loss.  She is up 12 pounds over the last week as her appetite has normalized.  We will add Emend to her care plan.    She is having several loose stools per her colostomy and has been hesitant to take Imodium due to prior history of constipation.  I encouraged her to try this with a maximum of 8 tablets/day.  She denies any infectious symptoms including fevers or chills.  No excess bleeding or bruising noted.  She had the expected cold sensitivity related to the Oxaliplatin for about 3 days following treatment.    Labs from 02/12/2020 demonstrated total white blood cell count of 5.14, ANC of 3.06, hemoglobin of 11.2, platelets of 211,000.  She was found to be iron deficient last week and is intolerant to oral iron secondary to GI distress.  For this reason, she initiated IV iron therapy with Injectafer last week.  She had received her second dose 02/12/2020    Patient has normalized hemoglobin 12.2 on 2/26/2020.    She reported on 5/5/2020 she had a recent visit to the emergency department for what was suspected to be an allergic reaction.  She called our on-call service on Saturday with reports of hand and face swelling.  She was  instructed to proceed to the emergency department at which time she was assessed and prescribed a Medrol Dosepak.  She had just completed her 5-FU and was unhooked on Friday, 5/3/2020.  Her symptoms have improved.  She does report persistent hyperpigmentation and mild swelling of the palms of the hands but this is much improved.  It was her right hand specifically that was swollen.  Her facial swelling has resolved.  She continues on Cefdinir nd since has 1 day remaining, she was prescribed cefdinir for a UTI requiring hospitalization at the end of April.  Reports no new symptoms.  Her labs are stable.  She is scheduled for treatment again.    Patient states at this time she is not tolerating her chemotherapy well.     Patient seen in the emergency department on 5/2/2020 for what was suspected to be an allergic reaction.  She called our on-call service on Saturday explaining that she was experiencing hand and face swelling.  She was instructed to proceed to the emergency department at which time she was assessed and prescribed a Medrol Dosepak.  She had just completed her 5-FU and was unhooked on Friday, 5/1/2020.      She reports since her ED visit on 5/2/2020 her symptoms have not improved. Her hands and feet were swollen upon presenting to the ED and she could barely make a fist. She states that she feels so swollen she is not able to stand it. She states that her skin on the hands and feet are peeling extensively as well, besides changing color to more dark.     She also reports significant fatigue after her first week of the 5-FU treatment but she expected this side effect. She also notices significant increase in her neuropathy to the point that she is not able to even walk around in her home without her house slippers due to irritation from her rugs. She denies and associated nausea or vomiting at this time. She also has episodes of epistaxis every day after the chemotherapy cycle #7.  She does report working  full-time during the week of chemotherapy.    Laboratory studies, 5/13/2020, show moderate thrombocytopenia platelets 123,000, low normal WBC 4140 including ANC 2720 and normal hemoglobin 13.4.  Because significant hand-foot syndrome, will decrease both 5-FU and oxaliplatin by 50%, and eliminate bolus dose of 5-FU.    On 5/21/2020 patient had a PET scan performed which showed no convincing evidence of residual disease in abdomen or pelvis, no metastatic disease within the chest or neck.     Cycle #8 FOLFOX 6 was given on 5/13/2020, with 50% dose reduction for both 5-FU and oxaliplatin, and also examination of bolus 5-FU.  She states on 5/27/2020 that with the recent reduction of the chemotherapy she feels significantly better. She has more energy and is able to do her daily routine.      PET scan examination on 5/21/2020 reported no evidence of metastatic disease in chest abdomen pelvis.  There was a stable 6 mm right upper lobe pulmonary nodule.    Laboratory studies on 5/27/2020 showed mild leukocytopenia WBC 3720 but a normal ANC 2250 and lymphocytes 630.  Normal platelets 163,000 and hemoglobin 12.6.  Chemistry lab reported normal renal function, liver function panel and a electrolytes, elevated glucose 164.    Laboratory studies 6/24/2020, showed normal hemoglobin 13.4 but macrocytosis .9.  Normal platelets 210,000 and WBC 5870.  She had normal CMP.     Patient last time was here in late June 2020.  Since that time she had surgical procedure for low anterior colon resection, coloanal anastomosis on 8/3/2020.  She later developed a perirectal abscess, required surgical drainage on 8/14/2020.  She was discharged on 8/27/2020.    Patient reports to me she still has lower abdominal wall vacuum suction in place.  She denies fever sweating chills.  Performance status is ECOG 1.  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  She does have visiting nurse come to the home for wound  care.    CT scan for chest abdomen pelvis on 9/9/2020 reported a new 8 mm noncalcified pulmonary nodule in the left lower lobe.  Otherwise stable right upper lobe 6 mm pulmonary nodule, and no evidence of disease recurrence in the abdomen or pelvis.     Laboratory study on 9/16/2020 reported elevated AST 55, ALT 95, alk phosphatase 143 but normal total bilirubin 0.3.  Chemistry lab otherwise unremarkable.  She has completed normal CBC.      Because of the abnormal CT scan examination for chest abdomen pelvis on 9/9/2020 reported a new 8 mm noncalcified pulmonary nodule in the left lower lobe, we obtained a PET scan examination for further evaluation, which was done on 9/18/2020.  This study reported several pulmonary nodules, some of them are hypermetabolic, newly developed compared to previous PET scan in May 2020.  Certainly does highly suspicious for metastatic disease.  There are also hypermetabolic activity in the abdomen and pelvic area which is hard to differentiate from surgical scar versus metastatic malignancy.    Laboratory study on 10/13/2020 reported normal CBC with Hb 13.9, platelets 302,000 and WBC 5520 including ANC 3830.  Chemistry lab reported normalized AST 20, alk phosphatase 116 improved ALT 35, and maintains normal bilirubin, with normal electrolytes and liver function panel.     Patient was started on first cycle of palliative chemotherapy FOLFIRI on 10/13/2020.    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  Patient had a new port placed 12/18/2020, and had held her Xarelto for 2 days prior to surgery.  She presented to the ER on 12/21/20 with complaints of facial and arm swelling for 3 days.  She also noted increased shortness of breath.  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  Thrombus within the  right internal jugular vein cannot be excluded.  Patient was started on IV heparin, and eventually transitioned to weight-based Lovenox, which she now continues.    PET scan examination on 9/24/2021 reported further shrinking of the tiny right upper lobe pulmonary nodule.  Otherwise no new lesions.  No evidence for metastatic disease in other areas.      Patient had CT scan for chest with IV contrast obtained on 12/14/2021 which reported small tiny stable pulmonary nodules. There is a 4 mm right upper lobe pulmonary nodule. There is also a stable 4 mm left lower lobe pulmonary nodule. There is also stable left upper lobe micronodule.     Laboratory studies today on 12/21/2021 reported normal hemoglobin 15.9 however .0, platelets 218,000 WBC 5360 including neutrophils 3730 lymphocytes 980. Chemistry lab reported normal renal function, electrolytes, glucose, and marginally elevated ALT 55, AST 34 but normal total bilirubin and alk phosphatase.     CT scan for chest abdomen pelvis 6/21/2022 reported sub-6 mm pulmonary nodules either stable or slightly smaller.  No new pulmonary nodules or evidence for disease progression.  There is no evidence for metastatic disease in the liver however it shows diffuse hepatic steatosis.  There was masslike thickening in the presacral space with the clips or calcification approximately 1.7 cm in greatest AP dimension but stable.    Laboratory study on 9/20/2022 reported normal hemoglobin 15.7 with mild macrocytosis .1.  She has normal CBC and platelets.  She also has unremarkable CMP.  Normal CEA 1.13 ng/mL.    Patient was seen by Dr. Ye, who performed ileostomy takedown on 11/14/2022.  Patient reports that she is recovering.  She still has a small open wound less than 1 cm in diameter, however close to 2 cm deep.  She has been changing dressing herself.  She denies fever sweating chills.    Patient has no other specific complaints.  She has excellent performance  status ECOG 0.  She denies chest pain dyspnea cough hemoptysis.  No abdominal pain.  No melena hematochezia.  Patient has been eating well, stable weight.  She works full-time.    She continues Lovenox injections and denies any significant bleeding issues.   No other new problems or concerns.     CT scan for chest abdomen pelvis obtained on 1/10/2023 reported stable small pulmonary nodules, no evidence for active or new disease.    Laboratory study on 1/10/2023 reported normal CBC and normal CMP.  CEA level is 0.99 ng/mL.    CT scan for chest, abdomen, and pelvis on 7/7/2023 reported stable small pulmonary nodules including a left upper lobe 5 mm nodule. There were no new masses, or pleural effusion. No enlarged supraclavicular, axillary, mediastinal, or hilar lymphadenopathy. Mediastinal vasculature normal. There is occlusion of the superior vena around the catheter with body wall  is present. There was no evidence for disease recurrence in the abdomen or pelvis. Bone is also negative for metastatic disease.    Laboratory studies obtained on 07/07/2023 also reported normal CBC, and normal CMP as well as low level CEA 1.07 ng/mL.    The CT scan for the chest on 10/27/2023 reported enlarging pulmonary nodules bilaterally. The right upper lobe lung nodule increased from 7 x 7 mm to 9 x 8 mm, and the left upper lobe nodule measured 8 x 9 mm from 5 x 6 mm in 04/2023. There is a different left upper lobe nodule 6 x 8 mm from previous 5 x 5 mm. The presacral tissue thickness measures up to 2.3 cm.    A laboratory study on 10/27/2023 reported CEA 1.58 ng/mL, normal CBC, and CMP.  Molecular study of peripheral blood Guardant Reveal is still pending.    The patient presents today on 11/17/2023 for reevaluation to discuss the results of PET scan examination as well as the results of tumor conference. I saw her recently on 11/03/2023 and because of enlarging pulmonary nodules on the CT scan, we requested a PET scan  examination. I presented her case yesterday on 11/16/2023 at the [facility].    The patient reports she is at her baseline condition as 2 weeks ago. No specific complaints, no chest pain, dyspnea, cough, etc. She has a regular bowel movement.    Her case was present at the [facility]. on 11/16/2023. The PET scan images and chest CT scan were reviewed in conjunction with her treatment history. The consensus was for patient to receive stereotactic radiation therapy for the right upper lobe lung lesion, and the bigger left upper lobe lesion, and monitor the smaller left upper lobe lesion with further images studies down the line. Also, the conference recommended Guardant Reveal study which we already ordered.     This Guardant Reveal study came back today as negative for ctDNA 0%.      CT of the chest on 2/2/2024 reported shrinking of the right upper lobe lesion from 9 mm to 6 mm, and the left upper lobe lesion also decreased from 9 mm to 6 mm. Otherwise, there are a couple of stable pulmonary nodules, 7 to 8 mm. The right hilar has a small lymph node that has increased from 6 mm to 8 mm and is otherwise stable. There was no suspicious lesion in the abdomen or pelvis.    Colonoscopy examination 9/11/2024 showed patchy mild inflammation characterized by congestion/edema in the descending colon. Evidence of previous endo coloanal anastomosis in the rectum. Presence of hemorrhoids.      MEDICATIONS    Current Outpatient Medications:     bismuth subsalicylate (PEPTO BISMOL) 262 MG/15ML suspension, Take 15 mL by mouth 4 (Four) Times a Day., Disp: , Rfl:     clonazePAM (KlonoPIN) 1 MG tablet, Take 1 tablet as needed daily for anxiety. May take one additional as needed for severe anxiety., Disp: 45 tablet, Rfl: 3    Cyanocobalamin (VITAMIN B-12 PO), Take 1 tablet by mouth 3 (Three) Times a Week. M-W-F, Disp: , Rfl:     dicyclomine (BENTYL) 20 MG tablet, TAKE 1 TABLET BY MOUTH EVERY 6  (SIX) HOURS AS NEEDED FOR IRRITABLE BOWEL SYMPTOMS, Disp: 360 tablet, Rfl: 2    diphenoxylate-atropine (LOMOTIL) 2.5-0.025 MG per tablet, TAKE 2 TABLETS BY MOUTH 4 TIMES A DAY, Disp: 240 tablet, Rfl: 11    Enoxaparin Sodium (LOVENOX) 100 MG/ML solution prefilled syringe syringe, INJECT 1 ML UNDER THE SKIN INTO THE APPROPRIATE AREA AS DIRECTED EVERY 12 (TWELVE) HOURS., Disp: 60 mL, Rfl: 2    escitalopram (LEXAPRO) 10 MG tablet, Take 1 tablet by mouth Daily., Disp: 90 tablet, Rfl: 1    famotidine (PEPCID) 20 MG tablet, TAKE 1 TABLET BY MOUTH TWICE A DAY, Disp: 180 tablet, Rfl: 2    HYDROcodone-acetaminophen (NORCO) 5-325 MG per tablet, Take 1 tablet by mouth Every 6 (Six) Hours As Needed for Moderate Pain., Disp: 60 tablet, Rfl: 0    hydrocortisone 2.5 % cream, APPLY RECTALLY 3 TIMES DAILY. INCLUDE APPLICATOR., Disp: , Rfl:     Hydrocortisone, Perianal, (ANUSOL-HC) 2.5 % rectal cream, Apply rectally 3 times daily.  Include applicator., Disp: 30 g, Rfl: 1    Loperamide HCl (IMODIUM PO), Take  by mouth As Needed., Disp: , Rfl:     Loratadine 10 MG capsule, Take 1 capsule by mouth Every Evening., Disp: , Rfl:     metoprolol succinate XL (TOPROL-XL) 25 MG 24 hr tablet, TAKE 0.5 TABLETS BY MOUTH EVERY NIGHT, Disp: 45 tablet, Rfl: 2    nystatin (MYCOSTATIN) 939790 UNIT/GM cream, Apply 1 Application topically to the appropriate area as directed 2 (Two) Times a Day., Disp: 30 g, Rfl: 2    nystatin-hydrocortisone-bacitracin in zinc oxide ointment, Apply 1 Application topically to the appropriate area as directed Daily., Disp: 60 g, Rfl: 2    octreotide (sandoSTATIN) 100 MCG/ML injection, Inject 1 mL under the skin into the appropriate area as directed 3 (Three) Times a Day., Disp: 90 mL, Rfl: 2    ondansetron (ZOFRAN) 8 MG tablet, TAKE 1 TABLET BY MOUTH THREE TIMES A DAY AS NEEDED FOR NAUSEA AND VOMITING, Disp: 60 tablet, Rfl: 1    pantoprazole (Protonix) 40 MG EC tablet, Take 1 tablet by mouth Daily., Disp: 90 tablet, Rfl:  "1    rosuvastatin (CRESTOR) 5 MG tablet, Take 1 tablet by mouth Daily., Disp: 90 tablet, Rfl: 0    Syringe/Needle, Disp, 27G X 1/2\" 1 ML misc, Use as directed for octreotide injections, Disp: 100 each, Rfl: 3  No current facility-administered medications for this visit.    Facility-Administered Medications Ordered in Other Visits:     fluorouracil (ADRUCIL) 3,360 mg in sodium chloride 0.9 % 240 mL chemo infusion - FOR HOME USE, 1,680 mg/m2 (Treatment Plan Recorded), Intravenous, Once, Patience Lorezno APRN, 3,360 mg at 01/15/25 1322    ALLERGIES:   No Known Allergies    SOCIAL HISTORY:       Social History     Tobacco Use    Smoking status: Every Day     Current packs/day: 1.00     Average packs/day: 1 pack/day for 25.0 years (25.0 ttl pk-yrs)     Types: Cigarettes     Passive exposure: Current    Smokeless tobacco: Never    Tobacco comments:     1 PPD/caffeine use    Vaping Use    Vaping status: Some Days    Substances: Nicotine    Devices: Disposable    Passive vaping exposure: Yes   Substance Use Topics    Alcohol use: Not Currently     Comment: RARELY    Drug use: Never         FAMILY HISTORY:   Mother has positive factor V Leiden mutation, although she did not have thrombosis, mother also is coronary disease with stenting, she also is occasional bruising.    Maternal grandmother had DVT, she had multiple surgical procedures.    Patient mother's half-brother had metastatic colon cancer at diagnosis in his 50s.    Maternal great aunt had breast cancer.           Vitals:    01/15/25 1043   BP: 112/75   Pulse: 79   Resp: 17   Temp: 98.2 °F (36.8 °C)   TempSrc: Oral   SpO2: 96%   Weight: 86 kg (189 lb 8 oz)   Height: 165.1 cm (65\")   PainSc: 0-No pain         1/15/2025    10:44 AM   Current Status   ECOG score 0     Physical Exam  Vitals reviewed.   Constitutional:       Appearance: Normal appearance. She is well-developed.   HENT:      Head: Normocephalic and atraumatic.      Comments:         Nose: Nose " normal.   Eyes:      Conjunctiva/sclera: Conjunctivae normal.   Cardiovascular:      Rate and Rhythm: Normal rate and regular rhythm.   Pulmonary:      Effort: Pulmonary effort is normal.      Breath sounds: Normal breath sounds.   Abdominal:      General: Bowel sounds are normal.      Palpations: Abdomen is soft. There is no mass.      Tenderness: There is no abdominal tenderness.   Musculoskeletal:      Right lower leg: No edema.      Left lower leg: No edema.   Skin:     Findings: No bruising or rash.   Neurological:      Mental Status: She is alert. Mental status is at baseline.   Psychiatric:         Mood and Affect: Mood normal. Affect is not tearful.       RECENT LABS:  Results from last 7 days   Lab Units 01/15/25  1020   WBC 10*3/mm3 5.47   NEUTROS ABS 10*3/mm3 3.73   HEMOGLOBIN g/dL 14.0   HEMATOCRIT % 41.9   PLATELETS 10*3/mm3 171     Results from last 7 days   Lab Units 01/15/25  1020   SODIUM mmol/L 142   POTASSIUM mmol/L 3.6   CHLORIDE mmol/L 106   CO2 mmol/L 25.8   BUN mg/dL 5*   CREATININE mg/dL 0.58   CALCIUM mg/dL 8.7   ALBUMIN g/dL 3.4*   BILIRUBIN mg/dL 0.2   ALK PHOS U/L 82   ALT (SGPT) U/L 16   AST (SGOT) U/L 23   GLUCOSE mg/dL 151*     Urinalysis without microscopic (no culture) - Urine, Clean Catch (01/15/2025 10:20)         IMAGING:  NM PET/CT Skull Base to Mid Thigh (08/14/2024 09:17)     NM PET/CT Skull Base to Mid Thigh (11/12/2024 14:53)     Assessment & Plan        ASSESSMENT:   1.  Metastatic rectal cancer.   Initial rectal ultrasound examination reported T3N0 disease without lymphadenopathy.   CT scan of chest, abdomen and pelvis reported no lymphadenopathy in the abdomen, pelvis or chest. She does have small pulmonary nodule 6-7 mm and 2 mm with indeterminate feature. There is no solid evidence of metastatic disease otherwise.   Based on the CT scan and rectal ultrasound imaging studies, this patient had stage IIA (T3N0M0) disease.   She had PET scan examination on 8/8/2019 which  reported a hypermetabolic right upper lobe pulmonary nodule 6 mm with SUV 5.6.  This is suspicious for metastatic disease however it is too small to be biopsied.  This patient may have stage IV disease.   She initiated concurrent radiation with continuous 5-FU on 8/12/2019.  Patient finished concurrent chemoradiation therapy.  Patient underwent surgical resection of the rectal tumor and diverting loop ileostomy on 12/2/2019 with Dr. Ye.  Pathology evaluation reported residual T3 disease, with 1 out of 14 lymph nodes positive for malignancy.  Certainly this patient has at least stage IIIb rectal cancer (T3N1M0?)  On 1/22/2020 adjuvant chemotherapy FOLFOX 6 regimen initiated.   On 2/5/2022 cycle #2 was delayed secondary to neutropenia.  She was given 3 days of Granix.   Emend added with cycle 3.  With improved nausea control  Continuing home Granix x3 days following 5-FU disconnect  3/11/2020 due for cycle 4, however, she is experiencing progressive abdominal pain and occasional fevers.   CT scan performed on 3/13/2020 reveals no evidence of progressive or recurrent disease.  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  Patient hospitalized 4/24-4/26/20 after cycle 5 of chemotherapy with acute UTI.  CT abdomen/pelvis noting fluid collection in the presacral region having diminished in size compared to CT dated 3/13/2020.  Patient was evaluated by Dr. Ye while in the hospital with further plans to evaluate possible colovaginal fistula following completion of chemotherapy.  Patient did respond to IV antibiotics and discharged home on oral cefdinir.  4/29/2020 cycle 6 of FOLFOX.  Urinary symptoms have resolved   Patient seen in the [facility] ED on 5/2/2020 for what was suspected to be an allergic reaction.  She called our service on Saturday explaining that she was experiencing hand and face swelling.  She was instructed to proceed to the emergency department at which time she was assessed and prescribed a Medrol  Dosepak.  She had just completed her 5-FU and was unhooked on Friday, 5/1/2020.  Her symptoms have resolved in the office on assessment, 5/5/2020.  The patient had grade 3 hand-foot syndrome based on symptomology.  Patient had cycle #8 of 5-FU on 5/13/2020. Due to her symptoms and poor tolerance to the 5-FU, her treatment dose will be reduced 50% for oxaliplatin and infusional 5-FU.  Bolus 5-FU will be discontinued..  On 5/21/2020 patient had a PET scan and it showed no evidence of of metastatic disease in the neck, chest, abdomen or pelvis.  There was fluid accumulation/abscess.   On 5/27/2020 discussed with the patient that we can discontinue chemotherapy at this time.  She will follow-up with Dr. Ye to discuss a possibility to reverse the ileostomy.  We likely will obtain anther PET scan in 3 to 4 months for reassessment.    Patient was seen by Dr. Ye on 6/19/2019 for evaluation and to discuss possible take down of her ileostomy.  She is scheduled to have a gastrografin enema on 7/2/2020 to evaluate for a fistula, and then a colonoscopy on 7/15/2020, both done by Dr. Ye.  She states that based on the above imaging and procedure findings, she may have a more extensive surgery done or just have her ileostomy reversed, which would likely be done in August 2020.  This will all be coordinated under the care of Dr. Ye.     CT scan from 09/09/2020 and also compared to her previous PET scan examination from 05/21/2020 and also the original PET scan from 08/09/2019.  The original PET scan there is a small right upper lobe pulmonary nodule 6 mm with SUV 5.6. So in the 05/2020 PET scan the nodule is still there but activity seems much less and this most recent CT scan examination also documented the preexisting small pulmonary nodule however there is a new left lower lobe pulmonary nodule 8 mm in size and I shared with the patient today this nodule is suspicious for metastatic disease. Laboratory studies  reported minimal CEA level 1.32 on 09/09/2020. Liver function panel was only marginally abnormal with the ALT 45, the remaining is normal. However laboratory study today showed worsening AST 55, ALT 95 and alkaline phosphatase 143 but is still normal, total bilirubin 0.3.   Recommended to have repeat PET scan examination for assessment because the left lower lobe pulmonary nodule is too small to be biopsied, and if the PET scan reports hypermetabolic lesion, I recommended to have stereotactic radiation therapy. Explained to patient that she is not a really good candidate to have thoracotomy for resection because she still has unhealed wound in the abdomen. Stereotactic radiation therapy will likely be a more feasible choice.   PET scan examination obtained on 9/18/2020 showed metastatic disease.  It reported a few hypermetabolic pulmonary nodules, and some new small pulmonary nodules, all of them highly suspicious for metastatic disease.  She also has hypermetabolic activity in the abdomen and pelvic area which could be related to scar tissue from her recent surgery versus metastatic disease.  Nevertheless overall picture fits with metastatic cancer.  Discussed with the patient on 9/20/2020, we reviewed the PET scan images together, and I recommended to have systematic chemotherapy, I do not think stereotactic reading therapy to the hypermetabolic lesions in the lungs warranted at this time because even those will be treated with reading therapy, she will still need systematic chemotherapy.  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  I did discuss with the patient using anti-EGFR monoclonal antibody versus anti-VEGF monoclonal antibody.     Palliative chemotherapy cycle#1 FOLFIRI was started on 10/13/2020.  No bolus 5-FU given considering previous poor tolerance.    NGS study from [facility] reported positive for [place] mutation.  Microsatellite stable, mutation burden  5/Mb.   Discussed with the patient 10/27/2020, because of the K-s[place] mutation, she is not a candidate for anti-EGFR monoclonal antibody such as Erbitux or vectibix.  She could be a candidate for anti-VEGF monoclonal antibody, however because of active wound, she is not a candidate right now at this moment.  Patient seen in the ED for acute right neck pain on 11/16/2020.  CT angiogram as well as CT of the cervical spine performed with no acute findings but notably one of her pulmonary nodules, left upper lobe, somewhat decreased in size.  Patient given prednisone pack.  Further details below.  Cycle #6 chemotherapy will be resumed on 1/5/2021.  Started back on original irinotecan.  PET scan examination obtained on 1/12/2021 after cycle #6 chemotherapy reported improved pulmonary nodule hypermetabolic activity.  Confirmed these are metastatic lesions.  Patient is evaluated 1/19/2020, will continue cycle #7 FOLFIRI chemotherapy.  However Avastin will be on hold see next section.   Patient was reevaluated on 2/2/2021, will continue chemotherapy cycle #8 FOLFIRI.  Avastin / biosimilar will be added.    She talked to [facility] specialist in mid February 2021, and had recommended palliative chemotherapy without surgical intervention of metastatic lung lesions.   On 3/2/2021, patient will proceed with cycle #10 FOLFIRI plus bevacizumab.  After 12 cycles, plan to start maintenance treatment.  3/16/2021 cycle 11.  Plus bevacizumab.  3/30/2021 cycle 12 FOLFIRI plus bevacizumab.  Having issues with worsening nausea despite premeds with dexamethasone, Aloxi, Emend, and Zofran at home.  Patient is requesting a dose of Phenergan, which is helped her in the past.  We will give this to her with treatment today.  PET scan examination on 4/6/2021 reported no evidence of hypermetabolic metastatic lesion.  Discussed with the patient on 4/13/2021, we reviewed the PET scan results.  We recommended to switch to  maintenance chemotherapy without irinotecan.  We will continue 5-FU and Avastin every 2 weeks.  We discussed possibility of switching to oral Xeloda treatment.  Discussed with the patient for side effects more so with hand-foot syndrome.  Patient is agreeable.   Xeloda 2000 mg twice daily 7 days on, 7 days off along with Avastin initiated 4/27/2021.  After only 5 doses of Xeloda significant hand-foot syndrome, Xeloda held. Patient requesting to be transitioned back to infusional 5-FU, as she felt that she tolerated infusional 5-FU without any problem.  The patient will receive 5-FU leucovorin and Avastin every 2 weeks.  Xeloda discontinued.  5/25/2021 continued cycle #4 maintenance 5-FU, leucovorin, Avastin tolerating this much better so far, we will continue this. Recommended to have 12 cycles of maintenance chemotherapy, then obtain PET scan for reevaluation.  We could discuss further treatment plan at that time.  9/14/2021 patient due for cycle 12 of additional maintenance 5-FU/leucovorin plus Avastin.  She continues to tolerate treatment well overall.  She is anxious in the office today with her Mediport not getting blood at first and also with upcoming scans being heavy on her mind.  She was instructed to take one of her Klonopin pills while here to help calm her mind.  Heart rate did improve from 140s down to 112.  Proceed with treatment today as scheduled.  PET scan examination on 9/24/2021 reported further shrinking of the right upper lobe tiny pulmonary nodule.  No other new lesions.  Discussed with patient on 9/24/2021 about further shrinking of the right upper lobe pulmonary nodule and no evidence for other new lesions. We recommended to have chemo break for 3 months with repeated CT scan for reevaluation.  Recent CT scan for chest 12/14/2021 and abdomen CT from 11/29/2021 reported no evidence for active disease. The right upper lobe pulmonary nodule, left lower lobe pulmonary nodule minimal about 4 mm  and stable. I discussed with patient today on 12/21/2021, recommended to give her another 3 months chemotherapy holiday and obtain CT scan afterwards for reevaluation. After discussion, patient is agreeable.   CT scan examination for chest abdomen and pelvis obtained on 3/15/2022 reported a slight increase of the left upper lobe pulmonary nodule 5 mm, from previous 3 mm.  The other pulmonary nodules were stable in size.  There is also small left upper lobe groundglass changes.   Dr. Nguyen reviewed images studies with the patient 3/23/2022, compared to multiple previous images including CT chest CT and PET scan, suspected disease progression.  Discussed with the patient, and I recommended to resume maintenance chemotherapy with 5-FU plus leucovorin plus Avastin repeating every 2 weeks, and obtaining CT scan for reassessment in 3 months.  Patient is agreeable.  Patient resumed maintenance chemotherapy on 3/29/2022 with 5-FU leucovorin and Avastin.   4/26/2022, proceed with cycle #27 maintenance 5-FU, leucovorin, and Avastin.  Patient continues to tolerate treatment well.    On 5/10/2022, we will proceed ahead with cycle #28 maintenance chemotherapy.  Plan to have CT scan after cycle #30.  5/24/2022 cycle 29 maintenance chemotherapy.  Continue to tolerate well.  6/7/2022 cycle #30 maintenance chemotherapy, continuing to tolerate well.   CT scan for chest abdomen pelvis with IV contrast obtained on 6/21/2022 reported sub-6 mm pulmonary nodules either stable or slightly smaller.  We reviewed the images with the patient together.  We discussed with the patient and recommended to hold chemotherapy for now with repeating CT scan in 3 months for reassessment.  CT scan of the chest abdomen pelvis 9/13/2022 reported stable condition, no evidence for recurrent or metastatic colon cancer.  On 1/10/2023 patient had a CT chest abdomen pelvis with reported no evidence for disease progression.  Laboratory study also showed normal  CEA.   Patient had a CT scan for chest, abdomen, and pelvis obtained on 04/11/2023. This study reported very small pulmonary nodules, the right upper lobe nodule is stable to 6 mm, however, the left upper lobe and the left lower lobe nodule increased to 5 mm from previous 4 mm. I discussed with the patient today on 04/19/2023, I recommend to obtain another CT scan in 3 months for reassessment. The nodules are so small, they likely will not be picked up by PET scan examination.  CT scan examination of the chest, abdomen, and pelvis obtained on 7/7/2023 reported stable small pulmonary nodules. No evidence for disease progression or new lesions in chest, abdomen, and pelvis.  Discussed with patient today on 7/14/2023, however, patient is concerning for disease progression. I recommended to obtain Guardant Reveal for circulating tumor DNA study. If the study is positive, we will further obtain PET scan examination, otherwise, we will obtain CT scan for chest, abdomen, and pelvis in 3 months for reassessment.  The patient had CT scan for the chest, abdomen, and pelvis obtained on 10/27/2023, which reported worsening pulmonary nodules at bilateral upper lobes. Laboratory studies showed low normal CEA level and normal liver function panel. The Guardant Reveal study is still pending. I discussed this with the patient on 11/03/2023 and we shared the images, and I recommended having a PET scan examination for further assessment. I will present her case at the multimodality lung conference. If those lesions are deemed to be metastatic lesions, I recommend having stereotactic radiotherapy. I discussed it with the patient, and she voiced understanding and was agreeable with that strategy.  I presented her case at the multimodality chest conference 11/16/2023.  The consensus was for patient to receive stereotactic radiation therapy for the right upper lobe lung lesion, and the bigger left upper lobe lesion, and monitor the  smaller left upper lobe lesion with further images studies down the line. Also, the conference recommended Guardant Reveal study which we already ordered. I discussed with the patient today on 11/17/2023, we explained to the patient that the opinion of the conference and she is agreeable with this and prefers this strategy because of limited toxicity compared to long term commitment to starting chemotherapy again. I recommend to obtain a CT scan for the chest, abdomen, and pelvis with both IV and oral contrast for reassessment in 3 months for reassessment of metastatic lung lesions and thickness in the pelvis where the PET scan reported a low activity SUV 2.4 in the surgical bed.   The patient had steroid-induced radiation therapy for the right upper lobe and one of the left upper lobe lesions.  Her CT scan examination on 02/02/2024 reported shrinking nodules of the right upper lobe and one of the left upper lobe lesions, both from 9 mm down to 6 mm. There are 2 stable pulmonary nodules 7 mm. Nothing new.  02/09/2024, I discussed with the patient and recommended CT scan for the chest, abdomen, and pelvis in 3 months for reassessment. Guardant Reveal study was 0% ctDNA. We will also continue laboratory studies every 3 months for Guardant Reveal, together with routine labs, CBC, CMP, and CEA.  CT scan of the chest, abdomen, and pelvis conducted on 4/23/2024 revealed stable multiple pulmonary nodules, with no other new pulmonary nodules or evidence of disease recurrence or abnormal pelvis. The patient's liver function panel was normal, and low-level CEA level was also noted. The Guardant Reveal study was able to be obtained earlier due to regulatory rules, and we hugo obtain today the Guardant reveal study, be available within 10 to 14 days.   Given the patient's metastatic liver lesion, and lung lesions from colon cancer, careful monitoring is necessary. A chest CT scan with IV contrast will be arranged in 3 months for  reevaluation. The study will be reviewed again in 3 months, which will include CBC, CMP, and CEA level.   Imaging study with chest CT scan on 08/02/2024 reported multiple bilateral pulmonary nodules that are relatively stable. However, the Guardant Reveal reported positive ctDNA indicating disease progression. A subsequent PET scan examination on 08/14/2024 reported newly developed hypermetabolic pulmonary nodules. The highest SUV is 3.7, corresponding to an 8 mm new right upper lobe nodule, and there are several other hypometabolic nodules in the lungs.   8/21/2024 resumption of chemotherapy with 5-FU bevacizumab  Triage visit 8/28/2024 with intractable diarrhea that was unclear if this is secondary to chemotherapy or infectious etiology given her known chronic colitis, fever and abdominal pain.  Further management as outlined below  9/4/2024 per review today diarrhea has improved though she is having some difficulty with passage of gas and stool,?  Related to significant inflammation in the rectum versus tumor obstruction.  The passage of gas has improved in the last few days but she does still have to change positions on the toilet to get stool to pass without it being painful.  Discussed all of this with Dr. Nguyen and we will refer the patient urgently back to Dr. Ye for at the very least rectal examination in the office versus full repeated colonoscopy.  Because of the potential for examination or invasive procedures, we will hold bevacizumab today.  Because the patient had significant diarrhea last week and concerns that it may be related to chemotherapy we will decrease her 5-FU/leucovorin today by 30%.  If she does well we can go back to full dose with cycle 3.     9/18/2024 she presented for evaluation prior to cycle #3 of palliative chemotherapy with 5-FU, leucovorin, and bevacizumab. Given her recent biopsy on 09/11/2024, it is prudent to postpone the Avastin treatment today to ensure safety.   Chemotherapy will be proceeded.      She presented for evaluation on 10/02/2024, tolerating chemotherapy except for diarrhea. This has been managed with octreotide. Proceed with cycle 4 chemotherapy today with 5-FU, leucovorin and resumption of bevacizumab.   Reevaluation 10/16/2024 due for cycle 5 5-FU, leucovorin, bevacizumab.  Due to ongoing significant diarrhea, bevacizumab will be placed on hold for potential surgical intervention.  Additionally, 5-FU will be dose reduced to 50%.  Additional adjustments as outlined below  Patient reviewed 10/30/2024 with improved tolerance to cycle five 5-FU, leucovorin.  We we will attempt to slightly escalate 5-FU dosing, increasing by 10% (40% dose reduction)  Patient did experience leakage of 5-FU, and return 10/31/2024 for evaluation of this.  She was sent for port dye study that showed correct location of her port, so 5-FU was resumed.  The patient's right rib pain is likely due to a new chair on the bone, as indicated by the PET scan. The PET scan revealed a new 2.8 x 1.3 cm groundglass opacity in the lateral aspect of the left apex with SUV 6.3. This appearance is nonspecific and may represent an infectious or inflammatory infiltrate. Continued monitoring and follow-up imaging are recommended.  The current regimen of 5-FU will be maintained, and Avastin will be reintroduced with full dose. 11/13/2024 will proceed ahead with 5-FU, leucovorin and bevacizumab.   12/4/2024 She also reports two bad days of diarrhea, likely due to Avastin. The dosage of Avastin will be reduced to 4 mg/kg. The 5-FU dosage will be increased to 1680 mg/m².   12/18/2024 proceed with 5-FU and Avastin continuing previous dose reductions as she had excellent tolerance to treatment 2 weeks ago.   12/31/2024 patient reports tolerating chemotherapy with the same dose adjustment.  This is a 1 day early because of the closure of the New Year's day holiday tomorrow.  Next cycle will be resumed in 15 days  back to her usual schedule.  Will plan to have CT scan for chest abdomen pelvis with IV contrast in early February 2024.  1/15/2025 proceed with 5-FU and bevacizumab maintaining previous dosing.    2.  Intractable diarrhea due to chemotherapy.   Patient developed diarrhea on Saturday, 8/24/2024.  Is unclear if this is related to infection as she did have fevers at the time the diarrhea began or chemotherapy.  She does have chronic colitis noted on most recent PET scan, this therefore could be diverticulitis flare  GI panel and C. difficile negative  8/29/2024 she did receive a single dose of octreotide  Begin empiric Flagyl 500 mg 3 times daily x 10 days  8/30/2024 discussed negative stool studies.  We will add Levaquin to already prescribed Flagyl to complete management of diverticulitis.  It is unclear if the patient's symptoms are related to diverticular flare given her previous abdominal pain and fever versus chemotherapy.  However, her symptoms are currently improved  9/4/2024 diarrhea has resolved.  Stools are now more soft with some form but she is having difficulty with passage of stool, having to change positions on the toilet to avoid pain.  We are referring back to Dr. Ye as detailed above.  She will finish out the Flagyl and Levaquin as prescribed having roughly 3 days left of both.  We will also adjust doses of 5-FU/leucovorin today with concern for potential chemotherapy-induced diarrhea.  If she does well this cycle we can increase back to full dose with cycle 3 per Dr. Nguyen.  On 9/18/2024 patient reports that she experienced significant diarrhea during cycle #2 chemotherapy on 09/04/2024, leading to a 30% dose reduction. Despite taking Lomotil 2 tablets four times a day, Imodium, and Pepto-Bismol, her diarrhea persisted. She received octreotide shots twice, which improved her symptoms from watery to mushy stools but did not fully resolve the issue. She will continue with the current regimen and  consider adding octreotide to her home regimen if diarrhea starts at home. The potential side effects of octreotide, including nausea, were discussed.   10/1/2024 she reports using octreotide self-injection at home, which has significantly improved her diarrhea. Most of the time, she only requires it once a day and occasionally twice a day, depending on the severity of her diarrhea. She is satisfied with the results, which have improved her quality of life and ability to eat properly.   She will also use Lomotil and the Imodium as needed.  Patient is very tearful in the office 10/16/2024 regarding debilitating diarrhea and interference with quality of life.  She questions potential surgical intervention and placement of colostomy due to ongoing pain in her rectum and burning of her skin from diarrhea.  We discussed adjustments today including increased dose of octreotide to 200 mcg 3 times daily for diarrhea.  Chemotherapy dose adjustments.  Diarrhea was slightly improved following most recent cycle of chemotherapy.  Continue supportive care  12/4/2024 patient reports that Avastin may be the agent causing her diarrhea.  So we will decrease dose by 20% and to see how things going.  Improved diarrhea with dose reduction to Avastin.  Continue previous dosing.   12/31/2024 She reports no significant diarrhea and continues to use octreotide three times a day, which appears to be effective.  1/15/2025 she does report exacerbation of diarrhea following most recent cycle which caused irritation of her internal hemorrhoids.  However, her symptoms were short-lived and resolved after a few days and she therefore wishes to maintain previous dosing      3.   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  Low-dose Lovenox discontinued and initiated Xarelto 20 mg daily.  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  Patient held Xarelto for 2 days prior to MediPort removal from the left chest wall and placement of new port in the right chest wall on 12/18/2020.  She resumed Xarelto that evening.  Progressive swelling in the neck, face, upper extremities prompting hospitalization with CT scan showing thrombosis in the right subclavian vein and SVC.  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  Although she had been off of Xarelto for a few days, clearly Xarelto was not prohibiting progression of her thrombosis after she resumed treatment and there was some evidence to suggest thrombosis had been present at least in the innominate vein on prior scan in September but now appears chronic.  Current acute thrombosis involving SVC and right subclavian.  Patient was admitted and placed on heparin drip  Patient was evaluated by vascular surgery and intervention was not recommended for thrombolysis/thrombectomy.  On 12/22/2020, the patient developed worsening shortness of breath, increasing upper extremity edema consistent with worsening SVC syndrome.  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  Symptoms improved and patient was discharged 12/24/2020 on Lovenox 100 mg every 12 hours.    Returns 12/29/2020 for evaluation continuing Lovenox, overall tolerating it well.  No bleeding issues.  Improved edema 1/5/2021.  Will continue Lovenox weight-based every 12 hours.  On  1/19/2021 and 2/2/2021, patient reports improved facial swelling.  She however does have being bruise on her abdomen wall where she does Lovenox injection.  However she does not have a spontaneous epistaxis, gum bleeding or other bleeding.   On 2/2/2021, we discussed that side effects of Avastin/biosimilar related to thrombosis.  Since this patient already has been on Lovenox, I think the benefits from treatment for her cancer will outweigh that risk of thrombosis, will proceed ahead with Avastin.  I cautioned patient to watch out for signs of worsening thrombosis patient voiced understanding.   5/11/2021 Lovenox dosing will be adjusted due to patient's weight loss.  We discussed she needs 1 mg/kg.  Her syringes are 100 mg syringes she will do 0.9 mL subcu every 12 hours.  8/3/2021 Patient continues to have bruising on abdomen from lovenox injections.  Will need to monitor weight and adjust dosing in future if further weight loss.   Stable condition on 9/28/2021.  Continue Lovenox anticoagulation.    Patient reports no chest pain no dyspnea no leg edema. However she had bruising and also most recently abnormal small abscess for which she actually went to ER on 11/29/2021, had I&D. The wound is healing. No further drainage. Denies fever sweating or chills. After discussion, she will continue Lovenox injection for now.   On 3/23/2022, patient reports good tolerance of Lovenox.  Nevertheless she still has small quantity of pus in the left lower abdomen abscess, she squeezes it every day to prevent it became worse.  She reports no fever sweating chills.  Recommended to start Augmentin 875 mg twice a day for 7 days.  She will continue Lovenox indefinitely.  On 4/12/2022, patient reports resolution of the small abscess at left lower abdominal wall.   On 5/10/2022, patient continues on Lovenox indefinitely.  No easy bleeding or bruising.  6/23/2022 continuing on Lovenox 1 mg/kg at a dose of 100 mg (patient weight is 96.6  kg today) every 12 hours.  On 1/17/2023, patient reports good tolerance, Lovenox will be continued.    Patient had a venous Doppler study on 02/05/2023, which reported acute left upper extremity DVT in the subclavian vein, axillary and the brachial vein, and also superficial thrombophlebitis in the basilic upper arm.   On 04/19/2023, patient reports that she was not compliant with anticoagulation at the time of recurrent DVT. She now is fully compliant and is using Lovenox.  Continues on anticoagulation without signs or symptoms of bleeding.  She does have occasional irritation and bleeding with wiping related to frequent diarrhea  Due to right sided pleuritic pain the patient was seen in the emergency department 10/22/2024 with a negative CT angiogram.  Without thrombosis.  Anticoagulation continued.  Patient had CTA again on 11/2/2024 due to continued pleuritic pain that was negative for PE.  1/15/2025 tolerating anticoagulation with Lovenox.  This will be continued.      4.  Mild anemia.   She also has history of iron deficiency.  Iron studies reveal iron saturation of 10%.  She was started on oral iron but was unable to tolerate due to GI side effects.   Status post Injectafer 2/5/2020 and 2/12/2020   Improved hemoglobin 13.5 on 1/5/2021.  3/16/2020 when hemoglobin now up to 16.2, hematocrit 47.6.  Patient admits that she has started smoking again, and I have encouraged her to quit.  She denies any snoring or sleep apnea diagnosis.  Patient is not taking oral iron.  We will closely monitor.  3/30/2020 hemoglobin 15.7, HCT 47.5.  Patient states that she has cut back significantly on her smoking, although not quit yet.  I have encouraged her to continue working on this.  We will continue to closely monitor.  Hemoglobin 14.6 on 6/8/2021 at the meantime he has worsening macrocytosis .6.    Laboratory studies 6/22/2021 reported excellent folate > 20 ng/mL, however low normal B12 level 357 pg/mL.    On  7/6/2021, normal hemoglobin 15.8, .9 and HCT 47.2%.  Patient was asked to start oral vitamin B12 at 1000 mcg daily.   9/14/2021 hemoglobin 17.0, hematocrit 52.1.  Monitor.  On 9/28/2021 hemoglobin 16.1 hematocrit 48.5%, continue to monitor.   Lab study on 12/14/2021 reported excellent ferritin 296 and iron saturation 25% with free iron 104 TIBC 424. Hemoglobin was 15.2 with .2.   Normal hemoglobin 14.6 on 3/15/2022.  Iron study reported normal ferritin 129.5 ng/mL however slightly low iron saturation 11%.  Continue to monitor for now.  1/15/2025 hemoglobin remains normal at 14.0    5.  Peripheral neuropathy secondary to chemotherapy.   This is mild involving bilateral hands and feet as reported on 9/16/2020.   Will avoid chemotherapy with oxaliplatin.  Stable mild neuropathy as of 11/10/2020  Patient reports worsening peripheral neuropathy and cycle #5 chemotherapy on 12/8/2020 with and without irinotecan.   On 1/5/2021, patient reports improvement of peripheral neuropathy, will add irinotecan back to chemotherapy.  Continue to monitor and adjust medication.  Stable.      6.  Depression.  Patient seen by JULIET Davenport on 11/9/2020.  She was started on mirtazapine.  Lexapro was discontinued.  This has definitely improved her mood with the patient feeling overall much better.  She is sleeping better.  Appetite is improved with her actually eating more than she wants to.    Condition is stable.  She plans to continue follow-up with supportive oncology.      7. Malfunction of the portal catheter  Patient reports there was no blood drawn from the portal catheter. CT scan of the chest on 7/7/2023 reported occlusion of the SVC around to the Port-A-Cath tubing. I recommend trying activase to see if it helps.  Otherwise, we may have to remove the catheter. Clinically, patient has no signs of SVC syndrome.  On 11/03/2023, the patient reports that her Port-A-Cath was able to be used for a CT scan for the  injection of intravenous dye; however, there was no blood return, so we needed to draw from her arm for the blood test. We will continue to keep Port-A-Cath for now.  On 02/09/2024, the patient reports that the port was able to be flushed. However, no blood was returned. We will continue to flush every 6 weeks.  9/4/2024 she continues to have no blood return from her port but can taste saline as we flush at each time and is functioning well otherwise.  Monitor.  10/16/2024 no specific problem.  Port dye study 10/31/2024 showing correct position of port.  Fibrin sheath present.  No issue today.    8. Internal hemorrhoids.  She has large internal hemorrhoids diagnosed during a flexible sigmoidoscopy on 09/11/2024. Hydrocortisone cream was prescribed for treatment.   Not active problem today.    9.  This patient also has strong family history for malignancy   The patient had appointment with genetic counseling on September 3, 2019.  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    10. Hypertension.  Continue management of hypertension and follow up with PCP.      11.  Chest and back pain  Ports this has been ongoing for about 4 weeks.  Started in her right chest/rib cage.  Originally this pain had completely resolved, however returned when she received her next chemotherapy infusion. She felt like the pain worsened after she was unhooked from her 5-FU and that the pain spread to her right scapular/back area.  Since unhook she has continued with pain in her right chest/back that has required her to take pain medicine regularly which is not typical for her.  She has pain with deep inspiration and pain with certain movements.  She also has pain when she pushes on the tissue around her port, though there is no redness or warmth around her port site aside from red rash in the shape of a bandaid and she reports being sensitive to bandaids.  Reviewed all of patients symptoms with Dr. Nguyen who recommended obtaining doppler  right upper extremity and PET scan for further evaluation of this severe right sided pain that has been evaluated by CTA without any signs of what could be causing her pain.  Of note she was seen in the ED 11/2/2024, had CT chest performed which was negative for PE.  EKG and troponin looked okay.  She was discharged home.    Healing fracture of the right 2nd rib. The PET scan on 11/12/2024 showed new hypermetabolic activity at a healing fracture on the anterior aspect of the right second rib with SUV 5.7. The patient should avoid activities that may exacerbate the pain and consider pain management options as needed.    12/4/2024 the patient reports improvement in rib pain, with the ability to lay on the affected side, though still experiencing some soreness and pain upon sneezing or deep breathing.    Not active problem today.        PLAN:    Proceeded today with palliative chemotherapy 5-FU leucovorin and bevacizumab.  Avastin will be maintained at 4 mg/kg, and 5-FU will be maintained at 1680 mg/m².   Patient return in 2 days to discontinue 5-FU infusion.  Continue octreotide, currently utilizing 100 mcg every 8 hours following chemotherapy  Continue Imodium and Lomotil as needed.  Continue Magic barrier cream as needed which was refilled today  Continue Protonix 40 mg daily.   Continue Gas-X.   Continue anticoagulation with Lovenox subcu injection every 12 hours.   Nurse practitioner follow-up in 2 weeks with CBC CMP and ongoing chemotherapy 5-FU leucovorin and bevacizumab.  Will check Guardant Reveal study on 1/29/2025 on day of her chemotherapy.  Will arrange patient have a CT scan for chest abdomen pelvis with IV contrast in early February 2025 for reassessment.  Follow-up with Dr. Nguyen 2/12/2025 to discuss results of laboratory studies and CT scan images, and to discuss further management plan.      The patient is on a high risk medication requiring close monitoring for toxicity.       Patience Lorenzo,  APRN  01/15/2025        CC:  Deepika Vela III NPMD Alverto Delgado MD

## 2025-01-17 ENCOUNTER — INFUSION (OUTPATIENT)
Dept: ONCOLOGY | Facility: HOSPITAL | Age: 45
End: 2025-01-17
Payer: COMMERCIAL

## 2025-01-17 DIAGNOSIS — C78.00 MALIGNANT NEOPLASM METASTATIC TO LUNG, UNSPECIFIED LATERALITY: ICD-10-CM

## 2025-01-17 DIAGNOSIS — Z45.2 ENCOUNTER FOR FITTING AND ADJUSTMENT OF VASCULAR CATHETER: ICD-10-CM

## 2025-01-17 DIAGNOSIS — Z79.899 ENCOUNTER FOR LONG-TERM (CURRENT) USE OF HIGH-RISK MEDICATION: Primary | ICD-10-CM

## 2025-01-17 DIAGNOSIS — C20 ADENOCARCINOMA OF RECTUM: ICD-10-CM

## 2025-01-17 PROCEDURE — 25010000002 HEPARIN LOCK FLUSH PER 10 UNITS: Performed by: INTERNAL MEDICINE

## 2025-01-17 RX ORDER — SODIUM CHLORIDE 9 MG/ML
1000 INJECTION, SOLUTION INTRAVENOUS ONCE
Status: DISCONTINUED | OUTPATIENT
Start: 2025-01-17 | End: 2025-01-17

## 2025-01-17 RX ORDER — SODIUM CHLORIDE 0.9 % (FLUSH) 0.9 %
10 SYRINGE (ML) INJECTION AS NEEDED
Status: DISCONTINUED | OUTPATIENT
Start: 2025-01-17 | End: 2025-01-17 | Stop reason: HOSPADM

## 2025-01-17 RX ORDER — HEPARIN SODIUM (PORCINE) LOCK FLUSH IV SOLN 100 UNIT/ML 100 UNIT/ML
500 SOLUTION INTRAVENOUS AS NEEDED
Status: DISCONTINUED | OUTPATIENT
Start: 2025-01-17 | End: 2025-01-17 | Stop reason: HOSPADM

## 2025-01-17 RX ORDER — HEPARIN SODIUM (PORCINE) LOCK FLUSH IV SOLN 100 UNIT/ML 100 UNIT/ML
500 SOLUTION INTRAVENOUS AS NEEDED
OUTPATIENT
Start: 2025-01-17

## 2025-01-17 RX ORDER — SODIUM CHLORIDE 0.9 % (FLUSH) 0.9 %
10 SYRINGE (ML) INJECTION AS NEEDED
OUTPATIENT
Start: 2025-01-17

## 2025-01-17 RX ADMIN — Medication 10 ML: at 13:00

## 2025-01-17 RX ADMIN — Medication 500 UNITS: at 13:00

## 2025-01-29 ENCOUNTER — OFFICE VISIT (OUTPATIENT)
Dept: ONCOLOGY | Facility: CLINIC | Age: 45
End: 2025-01-29
Payer: COMMERCIAL

## 2025-01-29 ENCOUNTER — INFUSION (OUTPATIENT)
Dept: ONCOLOGY | Facility: HOSPITAL | Age: 45
End: 2025-01-29
Payer: COMMERCIAL

## 2025-01-29 VITALS
DIASTOLIC BLOOD PRESSURE: 74 MMHG | BODY MASS INDEX: 30.42 KG/M2 | RESPIRATION RATE: 16 BRPM | SYSTOLIC BLOOD PRESSURE: 113 MMHG | WEIGHT: 182.6 LBS | OXYGEN SATURATION: 95 % | HEIGHT: 65 IN | HEART RATE: 95 BPM | TEMPERATURE: 98 F

## 2025-01-29 DIAGNOSIS — Z79.899 ENCOUNTER FOR LONG-TERM (CURRENT) USE OF HIGH-RISK MEDICATION: Primary | ICD-10-CM

## 2025-01-29 DIAGNOSIS — C78.00 MALIGNANT NEOPLASM METASTATIC TO LUNG, UNSPECIFIED LATERALITY: ICD-10-CM

## 2025-01-29 DIAGNOSIS — C20 ADENOCARCINOMA OF RECTUM: Primary | ICD-10-CM

## 2025-01-29 DIAGNOSIS — Z79.899 ENCOUNTER FOR LONG-TERM (CURRENT) USE OF HIGH-RISK MEDICATION: ICD-10-CM

## 2025-01-29 DIAGNOSIS — C20 ADENOCARCINOMA OF RECTUM: ICD-10-CM

## 2025-01-29 DIAGNOSIS — G89.3 CANCER ASSOCIATED PAIN: ICD-10-CM

## 2025-01-29 DIAGNOSIS — Z79.01 CHRONIC ANTICOAGULATION: ICD-10-CM

## 2025-01-29 DIAGNOSIS — Z79.899 HIGH RISK MEDICATION USE: ICD-10-CM

## 2025-01-29 DIAGNOSIS — I26.99 PULMONARY EMBOLISM WITHOUT ACUTE COR PULMONALE, UNSPECIFIED CHRONICITY, UNSPECIFIED PULMONARY EMBOLISM TYPE: ICD-10-CM

## 2025-01-29 LAB
ALBUMIN SERPL-MCNC: 3.4 G/DL (ref 3.5–5.2)
ALBUMIN/GLOB SERPL: 1.4 G/DL
ALP SERPL-CCNC: 79 U/L (ref 39–117)
ALT SERPL W P-5'-P-CCNC: 11 U/L (ref 1–33)
ANION GAP SERPL CALCULATED.3IONS-SCNC: 12.5 MMOL/L (ref 5–15)
AST SERPL-CCNC: 11 U/L (ref 1–32)
BASOPHILS # BLD AUTO: 0.01 10*3/MM3 (ref 0–0.2)
BASOPHILS NFR BLD AUTO: 0.2 % (ref 0–1.5)
BILIRUB SERPL-MCNC: 0.2 MG/DL (ref 0–1.2)
BILIRUB UR QL STRIP: NEGATIVE
BUN SERPL-MCNC: 6 MG/DL (ref 6–20)
BUN/CREAT SERPL: 10.3 (ref 7–25)
CALCIUM SPEC-SCNC: 8.3 MG/DL (ref 8.6–10.5)
CHLORIDE SERPL-SCNC: 107 MMOL/L (ref 98–107)
CLARITY UR: CLEAR
CO2 SERPL-SCNC: 23.5 MMOL/L (ref 22–29)
COLOR UR: YELLOW
CREAT SERPL-MCNC: 0.58 MG/DL (ref 0.57–1)
DEPRECATED RDW RBC AUTO: 54.6 FL (ref 37–54)
EGFRCR SERPLBLD CKD-EPI 2021: 113.9 ML/MIN/1.73
EOSINOPHIL # BLD AUTO: 0.15 10*3/MM3 (ref 0–0.4)
EOSINOPHIL NFR BLD AUTO: 2.3 % (ref 0.3–6.2)
ERYTHROCYTE [DISTWIDTH] IN BLOOD BY AUTOMATED COUNT: 14.6 % (ref 12.3–15.4)
GLOBULIN UR ELPH-MCNC: 2.5 GM/DL
GLUCOSE SERPL-MCNC: 111 MG/DL (ref 65–99)
GLUCOSE UR STRIP-MCNC: NEGATIVE MG/DL
HCT VFR BLD AUTO: 41.1 % (ref 34–46.6)
HGB BLD-MCNC: 13.6 G/DL (ref 12–15.9)
HGB UR QL STRIP.AUTO: ABNORMAL
IMM GRANULOCYTES # BLD AUTO: 0.01 10*3/MM3 (ref 0–0.05)
IMM GRANULOCYTES NFR BLD AUTO: 0.2 % (ref 0–0.5)
KETONES UR QL STRIP: NEGATIVE
LEUKOCYTE ESTERASE UR QL STRIP.AUTO: ABNORMAL
LYMPHOCYTES # BLD AUTO: 1.14 10*3/MM3 (ref 0.7–3.1)
LYMPHOCYTES NFR BLD AUTO: 17.3 % (ref 19.6–45.3)
MCH RBC QN AUTO: 33.3 PG (ref 26.6–33)
MCHC RBC AUTO-ENTMCNC: 33.1 G/DL (ref 31.5–35.7)
MCV RBC AUTO: 100.5 FL (ref 79–97)
MONOCYTES # BLD AUTO: 0.46 10*3/MM3 (ref 0.1–0.9)
MONOCYTES NFR BLD AUTO: 7 % (ref 5–12)
NEUTROPHILS NFR BLD AUTO: 4.81 10*3/MM3 (ref 1.7–7)
NEUTROPHILS NFR BLD AUTO: 73 % (ref 42.7–76)
NITRITE UR QL STRIP: NEGATIVE
NRBC BLD AUTO-RTO: 0 /100 WBC (ref 0–0.2)
PH UR STRIP.AUTO: 6.5 [PH] (ref 4.5–8)
PLATELET # BLD AUTO: 171 10*3/MM3 (ref 140–450)
PMV BLD AUTO: 10.2 FL (ref 6–12)
POTASSIUM SERPL-SCNC: 3.5 MMOL/L (ref 3.5–5.2)
PROT SERPL-MCNC: 5.9 G/DL (ref 6–8.5)
PROT UR QL STRIP: NEGATIVE
RBC # BLD AUTO: 4.09 10*6/MM3 (ref 3.77–5.28)
SODIUM SERPL-SCNC: 143 MMOL/L (ref 136–145)
SP GR UR STRIP: 1.02 (ref 1–1.03)
UROBILINOGEN UR QL STRIP: ABNORMAL
WBC NRBC COR # BLD AUTO: 6.58 10*3/MM3 (ref 3.4–10.8)

## 2025-01-29 PROCEDURE — 96413 CHEMO IV INFUSION 1 HR: CPT

## 2025-01-29 PROCEDURE — 25010000002 FLUOROURACIL PER 500 MG: Performed by: NURSE PRACTITIONER

## 2025-01-29 PROCEDURE — 25010000002 LEUCOVORIN CALCIUM PER 50 MG: Performed by: NURSE PRACTITIONER

## 2025-01-29 PROCEDURE — 81003 URINALYSIS AUTO W/O SCOPE: CPT

## 2025-01-29 PROCEDURE — 96367 TX/PROPH/DG ADDL SEQ IV INF: CPT

## 2025-01-29 PROCEDURE — 25010000002 DEXAMETHASONE SODIUM PHOSPHATE 100 MG/10ML SOLUTION: Performed by: NURSE PRACTITIONER

## 2025-01-29 PROCEDURE — 25010000002 FOSAPREPITANT PER 1 MG: Performed by: NURSE PRACTITIONER

## 2025-01-29 PROCEDURE — G0498 CHEMO EXTEND IV INFUS W/PUMP: HCPCS

## 2025-01-29 PROCEDURE — 25010000002 BEVACIZUMAB PER 10 MG: Performed by: NURSE PRACTITIONER

## 2025-01-29 PROCEDURE — 85025 COMPLETE CBC W/AUTO DIFF WBC: CPT

## 2025-01-29 PROCEDURE — 80053 COMPREHEN METABOLIC PANEL: CPT

## 2025-01-29 PROCEDURE — 25810000003 SODIUM CHLORIDE 0.9 % SOLUTION 250 ML FLEX CONT: Performed by: NURSE PRACTITIONER

## 2025-01-29 PROCEDURE — 96375 TX/PRO/DX INJ NEW DRUG ADDON: CPT

## 2025-01-29 PROCEDURE — 25010000002 PALONOSETRON PER 25 MCG: Performed by: NURSE PRACTITIONER

## 2025-01-29 RX ORDER — HYDROCODONE BITARTRATE AND ACETAMINOPHEN 5; 325 MG/1; MG/1
2 TABLET ORAL EVERY 6 HOURS PRN
Qty: 90 TABLET | Refills: 0 | Status: SHIPPED | OUTPATIENT
Start: 2025-01-29

## 2025-01-29 RX ORDER — SODIUM CHLORIDE 9 MG/ML
20 INJECTION, SOLUTION INTRAVENOUS ONCE
Status: COMPLETED | OUTPATIENT
Start: 2025-01-29 | End: 2025-01-29

## 2025-01-29 RX ORDER — PALONOSETRON 0.05 MG/ML
0.25 INJECTION, SOLUTION INTRAVENOUS ONCE
Status: COMPLETED | OUTPATIENT
Start: 2025-01-29 | End: 2025-01-29

## 2025-01-29 RX ORDER — PALONOSETRON 0.05 MG/ML
0.25 INJECTION, SOLUTION INTRAVENOUS ONCE
Status: CANCELLED | OUTPATIENT
Start: 2025-01-29

## 2025-01-29 RX ORDER — SODIUM CHLORIDE 9 MG/ML
20 INJECTION, SOLUTION INTRAVENOUS ONCE
Status: CANCELLED | OUTPATIENT
Start: 2025-01-29

## 2025-01-29 RX ADMIN — PALONOSETRON HYDROCHLORIDE 0.25 MG: 0.25 INJECTION INTRAVENOUS at 12:59

## 2025-01-29 RX ADMIN — DEXAMETHASONE SODIUM PHOSPHATE 12 MG: 10 INJECTION, SOLUTION INTRAMUSCULAR; INTRAVENOUS at 12:59

## 2025-01-29 RX ADMIN — FOSAPREPITANT DIMEGLUMINE 100 ML: 150 INJECTION, POWDER, LYOPHILIZED, FOR SOLUTION INTRAVENOUS at 13:16

## 2025-01-29 RX ADMIN — SODIUM CHLORIDE 20 ML/HR: 9 INJECTION, SOLUTION INTRAVENOUS at 13:00

## 2025-01-29 RX ADMIN — LEUCOVORIN CALCIUM 560 MG: 350 INJECTION, POWDER, LYOPHILIZED, FOR SUSPENSION INTRAMUSCULAR; INTRAVENOUS at 14:21

## 2025-01-29 RX ADMIN — BEVACIZUMAB 360 MG: 400 INJECTION, SOLUTION INTRAVENOUS at 13:49

## 2025-01-29 RX ADMIN — FLUOROURACIL 3360 MG: 50 INJECTION, SOLUTION INTRAVENOUS at 14:51

## 2025-01-29 NOTE — PROGRESS NOTES
REASON FOR FOLLOW UP:     Rectal cancer, rectal ultrasound examination in July 2019 reported T3N0 disease without lymphadenopathy. She does have small pulmonary nodule 6-7 mm and 2 mm with indeterminate feature. There is no solid evidence of metastatic disease otherwise. Patient has stage IIA (T3N0M0) disease.  The patient is heterozygous factor V Leiden, was on prophylactic anticoagulation with Lovenox 40 mg daily given her increased risk of thrombosis with MediPort and GI malignancy.   PET scan on 8/8/2019 reported an 8 mm hypermetabolic right upper lobe pulmonary nodule, which is suspicious for metastatic as well.    Patient had a PowerPort placement on the left upper chest by Dr. Joseph on 8/9/2019.  Patient was started on concurrent infusional 5-FU chemoradiation therapy on 8/12/2019, with planned complete surgical resection and further adjuvant chemotherapy with intention to cure the disease.   Patient underwent surgical resection of the primary rectal cancer by Dr. Ye on 12/2/2019.  Pathology evaluation reported residual T3N1 disease stage IIIb.  Diarrhea related to therapy and radiation.   Patient was started cycle 1 day 1 of adjuvant FOLFOX 6 on 1/23/2020.  On 2/5/2020 FOLFOX 6 cycle 2 delayed secondary to neutropenia.  Patient was given 3 days of Granix injection.  After cycle #2 we planned 3 days of Granix with each cycle.   Patient also intolerant of oral iron.  Patient received 2 doses of IV injectafer on 02/05/2020 and 02/12/2020.   02/12/2020 Proceed with cycle #2 of FOLFOX 6 with 3 days of Granix.  On 3/11/2020 cycle 4 postponed secondary to abdominal pain and occasional low-grade fevers.  CT scans ordered.  Cycle #4 resumed after CT scan revealed no evidence of disease.  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 Ye. patient has no fever.  Continue to observe.   Grade 3 hand-foot syndrome secondary to 5-FU after cycle #7  FOLFOX 6 chemotherapy, prompting ER visit.  Also has worsening peripheral neuropathy.   Cycle #8 FOLFOX 6 was given on 5/13/2020, with 50% dose reduction for both 5-FU and oxaliplatin, and also examination of bolus 5-FU.   PET scan examination on 5/21/2020 reported no evidence of metastatic disease in the chest abdomen pelvis.  Stable 6 mm RUL pulmonary nodule.  On 5/27/2020, I discussed with the patient that we can discontinue chemotherapy at this time.   Patient had a surgical procedure for low anterior colon resection, coloanal anastomosis on 8/3/2020.  CT scan for chest abdomen pelvis on 9/9/2020 reported a new 8 mm noncalcified pulmonary nodule in the left lower lobe of the lung.  Otherwise stable right upper lobe 6 mm pulmonary nodule, and no evidence of disease recurrence in the abdomen or pelvis.  PET scan examination on 9/18/2020 reported multiple hypermetabolic small pulmonary nodules/ new pulmonary nodules.   Patient was started on pelvic chemotherapy with FOLFIRI regimen on 10/13/2020.   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 It reported tumor mutation burden 5 Muts/Mb, microsatellite stable, TP53 mutation [id number], and others.   Cycle #5 was without irinotecan, due to peripheral neuropathy.  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  Patient was switched to weight-based Lovenox injection.  Cycle #6 5-FU and irinotecan was delayed by 2 weeks because of the above incident.  Patient had a telemedicine evaluation at that the [facility] [place] in February 2021.  They agreed with our treatment plan for palliative chemotherapy followed by maintenance chemotherapy.    PET scan examination on 4/6/2021 after cycle #12 palliative chemotherapy reported no evidence of hypermetabolic metastatic lesion.   Patient was started on maintenance chemotherapy with 5-FU and  Avastin on 4/13/2021. (Unable to tolerate Xeloda because of a significant hand-foot syndrome).   PET scan on 9/24/2021 obtained after cycle #12 maintenance chemotherapy with 5-FU, leucovorin, Avastin reported no evidence for active disease. We recommended 3 months chemotherapy holiday.  CT scan examination on 3/15/2022 reported slightly disease progression with increase in size of small pulmonary nodule.  We started patient back on maintenance chemotherapy on 3/29/2022 treatment with 5-FU plus leucovorin and Avastin, repeating every 2 weeks.  After 6 more maintenance chemotherapy, CT scan for chest abdomen pelvis was done on 6/21/2022 which reported stable sub-6 mm pulmonary nodules.  Patient had last maintenance chemotherapy on 6/7/2022.   Disease progression, patient was restarted back on chemotherapy with 5-FU leucovorin and bevacizumab 8/21/2024      HISTORY OF PRESENT ILLNESS:  The patient is a 44 y.o. female with the above-mentioned history, who is seen today for evaluation.     The patient returns the office today, 1/29/2025 in anticipation of 5-FU, leucovorin and bevacizumab.  She notes improved tolerance to her most recent cycle of therapy.  She previously had struggled with pain which she attributed to her hemorrhoids.  She did utilize creams prophylactically with a cycle of treatment and noted improved tolerance.  No nausea or vomiting.  Unfortunately, she did have a fall as her socks slipped walking down the steps.  She landed on her back against the hardwood steps.  This is caused some left flank pain.  She has no blood in her urine.  She has pain with coughing though does not have shortness of breath or hemoptysis.    Past Medical History:   Diagnosis Date    Abdominopelvic abscess 08/12/2020    ADMITTED TO [facility]    Abnormal Pap smear of cervix 02/02/1998    JULIUS I    Abscess of abdominal wall 11/28/2012    SEEN AT [facility] ER    Anemia in pregnancy     Anxiety     Bilateral epiphora 04/2020    Bilateral hand  swelling 05/02/2020    SEEN AT [facility] ER    Cervical lymphadenitis 06/06/2012    SEEN AT [facility] ER    Chemotherapy induced diarrhea 01/2021    Chemotherapy induced neutropenia 04/2020    Chemotherapy-induced nausea 02/2020    Chemotherapy-induced thrombocytopenia 05/2020    Chronic anticoagulation     Chronic diarrhea     Colon polyps     FOLLOWED BY DR. 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 ALEXANDRU HUNT    History of gestational diabetes     History of pre-eclampsia 1998    History of pre-eclampsia     History of radiation therapy 2019    FOLLOWED BY DR. JAVON LEWIS    History of TB skin testing 01/17/2009    TB Skin Test    History of TB skin testing 01/17/2009    TB Skin Test    History of transfusion 2019    12/2019    HPV in female 1998    Hypokalemia 09/2019    Hypomagnesemia 09/2019    Hyponatremia 06/2021    IBS (irritable bowel syndrome)     Ileostomy present 04/25/2020    Take down on 11/3/2022    Infected insect bite of neck 05/27/2016    SEEN AT [facility]    Kidney stones 08/09/2007    SEEN AT [facility] ER    Low anterior resection syndrome 12/2022    FOLLOWED BY DR. GERONIMO ESPARZA    Lump of right breast     SWOLLEN LYMPH NODE    Lung cancer 09/28/2020    METASTATIC LUNG CANCER    Macrocytosis 07/2021    Monopolar depression     On anticoagulant therapy     Pain associated with surgical procedure 01/29/2020    Palmar-plantar  erythrodysesthesia 2021    Perirectal abscess 2020    Pulmonary embolism without acute cor pulmonale 09/20/2019    X 3; ADMITTED TO [facility]    Pulmonary nodule, right 2020    Rectal cancer 2019    STAGE IIA, INVASIVE MODERATELY DIFFERENTIATED ADENOCARCINOMA, CHEMO AND XRT FINISHED 2019    Right shoulder pain 2020    SEEN AT [facility] ER    Right ureteral stone 10/01/2019    SEEN AT [facility] ER    SAB (spontaneous ) 1996     A2-1 INDUCED    Sciatica     Sepsis due to cellulitis 2002    LEFT GREAT TOE, ADMITTED TO [facility]    Tachycardia 2020    Thrombosis of superior vena cava 2020    AND BRACHIOCEPHALIC VEIN, ADMITTED TO [facility]    Urinary urgency 2020   Patient had COVID-19 infection diagnosed on 2022 despite was fully vaccinated and boosted.  She recovered without problem.      Past Surgical History:   Procedure Laterality Date    ABDOMINAL SURGERY      CHOLECYSTECTOMY N/A 10/10/2003    LAPAROSCOPIC WITH CHOLANGIOGRAM, DR. JAMEY TALAVERA AT [facility]    COLON RESECTION N/A 2019    Procedure: laparoscopic low anterior colon resection with mobilization of splenic flexure and diverting loop ileostomy: ERAS;  Surgeon: Padmaja Esparza MD;  Location: [facility] OR;  Service: General    COLON RESECTION N/A 2020    Procedure: LOW ANTERIOR COLON RESECTION, COLOANAL ANASTOMOSIS, MOBILIZATION SPLENIC FLEXURE;  Surgeon: Padmaja Esparza MD;  Location: [facility] OR;  Service: General;  Laterality: N/A;    COLONOSCOPY N/A 07/15/2020    PATENT ANASTAMOSIS IN MID RECTUM, RESCOPE IN 1 YR, DR. PADMAJA ESPARZA AT [facility]    COLONOSCOPY N/A 2022    DIFFUSE AREA OF MODERATELY FRIABLE MUCOSA IN ENTIRE COLON , DIVERSION COLITIS, PATENT AND HEALTHY ANASTAMOSIS, DR. PADMAJA ESPARZA AT [facility]    COLONOSCOPY N/A 2023    6 MM SESSILE SERRATED ADENOMA POLYP IN DESCENDING, PATENT ANASTAMOSIS IN DISTAL RECTUM, RESCOPE IN 2 YRS, DR. PADMAJA ESPARZA AT [facility]    COLONOSCOPY W/ POLYPECTOMY N/A  07/08/2019    15 MM TUBULOVILLOUS ADENOMA POLYP IN HEPATIC FLEXURE, 20 MMTUBULOVILLOUS ADENOMA WITH HIGH GRADE DYSPLASIA IN RECTOSIGMOID, 4 CM MASS IN MID RECTUM, PATH: INVASIVE MODERATELY DIFFERENTIATED ADENOCARCINOMA, DR. JENNIFER LI AT [facility]    DILATATION AND EVACUATION N/A 2009    ENDOSCOPY N/A 07/08/2019    LA GRADE A ESOPHAGITIS, GASTRITIS, ALL BIOPSIES BENIGN, DR. JENNIFER LI AT [facility]    ILEOSTOMY CLOSURE N/A 11/14/2022    Procedure: ILEOSTOMY TAKEDOWN;  Surgeon: Geronimo Ye MD;  Location: [facility] OR;  Service: General;  Laterality: N/A;    INCISION AND DRAINAGE PERIRECTAL ABSCESS N/A 08/14/2020    Procedure: INCISION AND DRAINAGE OF retrorectal dehiscence abcess with drain placement and irrigation;  Surgeon: Geronimo Ye MD;  Location: [facility] MAIN OR;  Service: General;  Laterality: N/A;    PAP SMEAR N/A 01/24/2014    SIGMOIDOSCOPY N/A 07/24/2019    INFILTRATIVE PARTIALLY OBSTRUCTING LARGE RECTAL CANCER, AREA TATOOED, DR. GREONIMO YE AT [facility]    SIGMOIDOSCOPY N/A 11/23/2019    AN ULCERATED PARTIALLY OBSTRUCTING MASS IN MID RECTUM, AREA TATTOOED, DR. GERONIMO YE AT [facility]    SIGMOIDOSCOPY N/A 07/22/2021    PATENT ANASTAMOSIS IN DISTAL RECTUM, RESCOPE IN 1 YR, DR. GERONIMO YE AT [facility]    SIGMOIDOSCOPY N/A 09/11/2024    PATCHY INFLAMMATION AND EDEMA IN DESCENDING, AREA BIOPSIED AND WAS BENIGN, PATENT AND HEALTHY ANASTAMOSIS, HEMORRHOIDS,RESCOPE(CY) 12/2025, DR. GERONIMO YE AT [facility]    TRANSRECTAL ULTRASOUND N/A 07/24/2019    Procedure: ULTRASOUND TRANSRECTAL;  Surgeon: Geronimo Ye MD;  Location: [facility] ENDOSCOPY;  Service: General    URETEROSCOPY LASER LITHOTRIPSY WITH STENT INSERTION Right 10/30/2019    DR. ESTUARDO BEASLEY AT [place]    VAGINAL DELIVERY N/A 09/18/1998    VENOUS ACCESS DEVICE (PORT) INSERTION Left 08/09/2019    Procedure: INSERTION VENOUS ACCESS DEVICE;  Surgeon: Sj Joseph MD;  Location: [facility] OR [facility];  Service: General    VENOUS ACCESS  DEVICE (PORT) INSERTION N/A 12/18/2020    Procedure: INSERTION VENOUS ACCESS DEVICE right side, removal venous access device left side;  Surgeon: Sj Joseph MD;  Location: [facility];  Service: General;  Laterality: N/A;    WISDOM TOOTH EXTRACTION Bilateral 1993       HEMATOLOGIC/ONCOLOGIC HISTORY:      The patient reports she has intermittent rectal bleeding and urgency, mucous with her stool, starting sometime in 2016. At that time she was referred to GI service, and was diagnosed of irritable bowel syndrome. Nevertheless she had worsening urgency for bowel movement, and had a couple of incidents losing control of stool. She was recently seen by Roland Thorpe MD again and had colonoscopy and EGD exam on 07/08/2019. She was found having a circumferential rectal mass. According to the procedure note, the patient had a fungating circumferential bleeding 4 cm mass in the middle rectum, at distance between 13 cm and 17 cm from the anus. Mass was causing partial obstruction. There were also colon polyps found at the hepatic flexure and also at the rectosigmoid junction 23 cm from the anus. Both were resected and retrieved. EGD examination reported grade A esophagitis at the GE junction and patchy discontinuous erythema and aggravation of the mucosa without bleeding in the stomach body. There is normal mucosa of the duodenum. Pathology evaluation from this procedure reported moderately differentiated adenocarcinoma involving the rectal mass. The rectal sigmoid junction polyp was tubular/tubulovillous adenoma with high grade dysplasia negative for invasive malignancy. The hepatic flexure polyp was also tubular/tubulovillous adenoma negative for high grade dysplasia or malignancy. The biopsy from the esophagus reports squamocolumnar mucosa with inflammatory changes suggestive of mild reflux esophagitis, negative for interstitial metaplasia. Gastric biopsy was benign and duodenal biopsy was also benign. There is no  mention of H-pylori.     Patient was subsequently referred to colorectal surgeon Padmaja Ye MD for further evaluation. The patient had CT scan examination for chest, abdomen and pelvis requested by Dr. Ye and were done on 07/13/2019. The study reported no evidence of lymphadenopathy in the abdomen and pelvis. There was wall thickening of the rectosigmoid junction. Normal spleen, pancreas, adrenal glands and kidneys. There was fatty liver infiltration but no focal lymphatic lesions. There was a small 6-7 mm noncalcified nodule in the right upper lobe and a tiny 3 mm subpleural nodule in the right middle lobe. No mediastinal or hilar lymphadenopathy.     Dr. Ye performed staging rectal ultrasound examination. This study reported tumor penetrating through the muscularis propria as T3 disease; there were no lymph nodes identified.    She had a hospitalization in late September 2019 because of newly discovered pulmonary emboli.  She was on prophylactic Lovenox prior to the incident of PE.  Now she is on full dose Xarelto anticoagulation.  Patient reports no further chest pain dyspnea.  She denies bleeding or bruising.  During her hospitalization, she was seen by Dr. Ye, who plans to have surgery 8 to 12 weeks after finishing radiation therapy.  She finished her radiation on 9/19/2019.     I noticed patient also presented to the emergency room on 10/1/2019 complaining of right flank area pain.  I reviewed the images studies and indeed she has a very small 1 to 2 mm obstructing kidney stone in the UV junction.  Patient is still symptomatic with some pains and dysuria.  She denies fever sweating or chills.    Laboratory study on 10/7/2019 reported normal CBC including hemoglobin 13.1, platelets 301,000, WBC 6170 and ANC 4900 lymphocytes 590 monocytes 480.      The nurse reported malfunction of port-a-catheter on 10/7/2019.  Port study on 10/8/2019 showed fibrin sheath around the distal aspect of the Mediport  catheter in the SVC. This does not appear to hinder injection, but does prevent aspiration at this time.    Patient underwent surgical resection of the colon on 12/2/2019 with Dr. Ye.  Pathology evaluation reported residual T3 disease, also 1 out of 14 lymph nodes positive for malignancy.  So this patient in either had at least stage IIIb disease (T3 N1 M0?).      Adjuvant chemotherapy FOLFOX 6 will be started on 1/22/2020.  Laboratory study reported iron saturation 10%, free iron 31 TIBC 319 and ferritin 168.  Her hemoglobin was 11.8, WBC 5600, and platelets 347,000.    Patient was here on 02/12/2020 for cycle 2 of her FOLFOX.  This is delayed x1 week secondary to neutropenia.  The patient ultimately received 3 days worth of Neupogen with recovery of her blood counts.  Of note, the patient struggled with significant nausea without any episodes of vomiting following cycle #1 of chemotherapy resulting in significant weight loss.  She is up 12 pounds over the last week as her appetite has normalized.  We will add Emend to her care plan.    She is having several loose stools per her colostomy and has been hesitant to take Imodium due to prior history of constipation.  I encouraged her to try this with a maximum of 8 tablets/day.  She denies any infectious symptoms including fevers or chills.  No excess bleeding or bruising noted.  She had the expected cold sensitivity related to the Oxaliplatin for about 3 days following treatment.    Labs from 02/12/2020 demonstrated total white blood cell count of 5.14, ANC of 3.06, hemoglobin of 11.2, platelets of 211,000.  She was found to be iron deficient last week and is intolerant to oral iron secondary to GI distress.  For this reason, she initiated IV iron therapy with Injectafer last week.  She had received her second dose 02/12/2020    Patient has normalized hemoglobin 12.2 on 2/26/2020.    She reported on 5/5/2020 she had a recent visit to the emergency department for  what was suspected to be an allergic reaction.  She called our on-call service on Saturday with reports of hand and face swelling.  She was instructed to proceed to the emergency department at which time she was assessed and prescribed a Medrol Dosepak.  She had just completed her 5-FU and was unhooked on Friday, 5/3/2020.  Her symptoms have improved.  She does report persistent hyperpigmentation and mild swelling of the palms of the hands but this is much improved.  It was her right hand specifically that was swollen.  Her facial swelling has resolved.  She continues on Cefdinir nd since has 1 day remaining, she was prescribed cefdinir for a UTI requiring hospitalization at the end of April.  Reports no new symptoms.  Her labs are stable.  She is scheduled for treatment again.    Patient states at this time she is not tolerating her chemotherapy well.     Patient seen in the emergency department on 5/2/2020 for what was suspected to be an allergic reaction.  She called our on-call service on Saturday explaining that she was experiencing hand and face swelling.  She was instructed to proceed to the emergency department at which time she was assessed and prescribed a Medrol Dosepak.  She had just completed her 5-FU and was unhooked on Friday, 5/1/2020.      She reports since her ED visit on 5/2/2020 her symptoms have not improved. Her hands and feet were swollen upon presenting to the ED and she could barely make a fist. She states that she feels so swollen she is not able to stand it. She states that her skin on the hands and feet are peeling extensively as well, besides changing color to more dark.     She also reports significant fatigue after her first week of the 5-FU treatment but she expected this side effect. She also notices significant increase in her neuropathy to the point that she is not able to even walk around in her home without her house slippers due to irritation from her rugs. She denies and  associated nausea or vomiting at this time. She also has episodes of epistaxis every day after the chemotherapy cycle #7.  She does report working full-time during the week of chemotherapy.    Laboratory studies, 5/13/2020, show moderate thrombocytopenia platelets 123,000, low normal WBC 4140 including ANC 2720 and normal hemoglobin 13.4.  Because significant hand-foot syndrome, will decrease both 5-FU and oxaliplatin by 50%, and eliminate bolus dose of 5-FU.    On 5/21/2020 patient had a PET scan performed which showed no convincing evidence of residual disease in abdomen or pelvis, no metastatic disease within the chest or neck.     Cycle #8 FOLFOX 6 was given on 5/13/2020, with 50% dose reduction for both 5-FU and oxaliplatin, and also examination of bolus 5-FU.  She states on 5/27/2020 that with the recent reduction of the chemotherapy she feels significantly better. She has more energy and is able to do her daily routine.      PET scan examination on 5/21/2020 reported no evidence of metastatic disease in chest abdomen pelvis.  There was a stable 6 mm right upper lobe pulmonary nodule.    Laboratory studies on 5/27/2020 showed mild leukocytopenia WBC 3720 but a normal ANC 2250 and lymphocytes 630.  Normal platelets 163,000 and hemoglobin 12.6.  Chemistry lab reported normal renal function, liver function panel and a electrolytes, elevated glucose 164.    Laboratory studies 6/24/2020, showed normal hemoglobin 13.4 but macrocytosis .9.  Normal platelets 210,000 and WBC 5870.  She had normal CMP.     Patient last time was here in late June 2020.  Since that time she had surgical procedure for low anterior colon resection, coloanal anastomosis on 8/3/2020.  She later developed a perirectal abscess, required surgical drainage on 8/14/2020.  She was discharged on 8/27/2020.    Patient reports to me she still has lower abdominal wall vacuum suction in place.  She denies fever sweating chills.  Performance  status is ECOG 1.  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  She does have visiting nurse come to the home for wound care.    CT scan for chest abdomen pelvis on 9/9/2020 reported a new 8 mm noncalcified pulmonary nodule in the left lower lobe.  Otherwise stable right upper lobe 6 mm pulmonary nodule, and no evidence of disease recurrence in the abdomen or pelvis.     Laboratory study on 9/16/2020 reported elevated AST 55, ALT 95, alk phosphatase 143 but normal total bilirubin 0.3.  Chemistry lab otherwise unremarkable.  She has completed normal CBC.      Because of the abnormal CT scan examination for chest abdomen pelvis on 9/9/2020 reported a new 8 mm noncalcified pulmonary nodule in the left lower lobe, we obtained a PET scan examination for further evaluation, which was done on 9/18/2020.  This study reported several pulmonary nodules, some of them are hypermetabolic, newly developed compared to previous PET scan in May 2020.  Certainly does highly suspicious for metastatic disease.  There are also hypermetabolic activity in the abdomen and pelvic area which is hard to differentiate from surgical scar versus metastatic malignancy.    Laboratory study on 10/13/2020 reported normal CBC with Hb 13.9, platelets 302,000 and WBC 5520 including ANC 3830.  Chemistry lab reported normalized AST 20, alk phosphatase 116 improved ALT 35, and maintains normal bilirubin, with normal electrolytes and liver function panel.     Patient was started on first cycle of palliative chemotherapy FOLFIRI on 10/13/2020.    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  Patient had a new port placed 12/18/2020, and had held her Xarelto for 2 days prior to surgery.  She presented to the ER on 12/21/20 with complaints of facial and arm swelling for 3  days.  She also noted increased shortness of breath.  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  Thrombus within the right internal jugular vein cannot be excluded.  Patient was started on IV heparin, and eventually transitioned to weight-based Lovenox, which she now continues.    PET scan examination on 9/24/2021 reported further shrinking of the tiny right upper lobe pulmonary nodule.  Otherwise no new lesions.  No evidence for metastatic disease in other areas.      Patient had CT scan for chest with IV contrast obtained on 12/14/2021 which reported small tiny stable pulmonary nodules. There is a 4 mm right upper lobe pulmonary nodule. There is also a stable 4 mm left lower lobe pulmonary nodule. There is also stable left upper lobe micronodule.     Laboratory studies today on 12/21/2021 reported normal hemoglobin 15.9 however .0, platelets 218,000 WBC 5360 including neutrophils 3730 lymphocytes 980. Chemistry lab reported normal renal function, electrolytes, glucose, and marginally elevated ALT 55, AST 34 but normal total bilirubin and alk phosphatase.     CT scan for chest abdomen pelvis 6/21/2022 reported sub-6 mm pulmonary nodules either stable or slightly smaller.  No new pulmonary nodules or evidence for disease progression.  There is no evidence for metastatic disease in the liver however it shows diffuse hepatic steatosis.  There was masslike thickening in the presacral space with the clips or calcification approximately 1.7 cm in greatest AP dimension but stable.    Laboratory study on 9/20/2022 reported normal hemoglobin 15.7 with mild macrocytosis .1.  She has normal CBC and platelets.  She also has unremarkable CMP.  Normal CEA 1.13 ng/mL.    Patient was seen by Dr. Ye, who performed ileostomy takedown on 11/14/2022.  Patient reports that she is recovering.  She still has a small open wound less than 1 cm in diameter, however close to 2 cm deep.  She  has been changing dressing herself.  She denies fever sweating chills.    Patient has no other specific complaints.  She has excellent performance status ECOG 0.  She denies chest pain dyspnea cough hemoptysis.  No abdominal pain.  No melena hematochezia.  Patient has been eating well, stable weight.  She works full-time.    She continues Lovenox injections and denies any significant bleeding issues.   No other new problems or concerns.     CT scan for chest abdomen pelvis obtained on 1/10/2023 reported stable small pulmonary nodules, no evidence for active or new disease.    Laboratory study on 1/10/2023 reported normal CBC and normal CMP.  CEA level is 0.99 ng/mL.    CT scan for chest, abdomen, and pelvis on 7/7/2023 reported stable small pulmonary nodules including a left upper lobe 5 mm nodule. There were no new masses, or pleural effusion. No enlarged supraclavicular, axillary, mediastinal, or hilar lymphadenopathy. Mediastinal vasculature normal. There is occlusion of the superior vena around the catheter with body wall  is present. There was no evidence for disease recurrence in the abdomen or pelvis. Bone is also negative for metastatic disease.    Laboratory studies obtained on 07/07/2023 also reported normal CBC, and normal CMP as well as low level CEA 1.07 ng/mL.    The CT scan for the chest on 10/27/2023 reported enlarging pulmonary nodules bilaterally. The right upper lobe lung nodule increased from 7 x 7 mm to 9 x 8 mm, and the left upper lobe nodule measured 8 x 9 mm from 5 x 6 mm in 04/2023. There is a different left upper lobe nodule 6 x 8 mm from previous 5 x 5 mm. The presacral tissue thickness measures up to 2.3 cm.    A laboratory study on 10/27/2023 reported CEA 1.58 ng/mL, normal CBC, and CMP.  Molecular study of peripheral blood Guardant Reveal is still pending.    The patient presents today on 11/17/2023 for reevaluation to discuss the results of PET scan examination as well as the  results of tumor conference. I saw her recently on 11/03/2023 and because of enlarging pulmonary nodules on the CT scan, we requested a PET scan examination. I presented her case yesterday on 11/16/2023 at the [facility].    The patient reports she is at her baseline condition as 2 weeks ago. No specific complaints, no chest pain, dyspnea, cough, etc. She has a regular bowel movement.    Her case was present at the [facility]. on 11/16/2023. The PET scan images and chest CT scan were reviewed in conjunction with her treatment history. The consensus was for patient to receive stereotactic radiation therapy for the right upper lobe lung lesion, and the bigger left upper lobe lesion, and monitor the smaller left upper lobe lesion with further images studies down the line. Also, the conference recommended Guardant Reveal study which we already ordered.     This Guardant Reveal study came back today as negative for ctDNA 0%.      CT of the chest on 2/2/2024 reported shrinking of the right upper lobe lesion from 9 mm to 6 mm, and the left upper lobe lesion also decreased from 9 mm to 6 mm. Otherwise, there are a couple of stable pulmonary nodules, 7 to 8 mm. The right hilar has a small lymph node that has increased from 6 mm to 8 mm and is otherwise stable. There was no suspicious lesion in the abdomen or pelvis.    Colonoscopy examination 9/11/2024 showed patchy mild inflammation characterized by congestion/edema in the descending colon. Evidence of previous endo coloanal anastomosis in the rectum. Presence of hemorrhoids.      MEDICATIONS    Current Outpatient Medications:     bismuth subsalicylate (PEPTO BISMOL) 262 MG/15ML suspension, Take 15 mL by mouth 4 (Four) Times a Day., Disp: , Rfl:     clonazePAM (KlonoPIN) 1 MG tablet, Take 1 tablet as needed daily for anxiety. May take one additional as needed for severe anxiety., Disp: 45 tablet, Rfl: 3    Cyanocobalamin (VITAMIN B-12  PO), Take 1 tablet by mouth 3 (Three) Times a Week. M-W-F, Disp: , Rfl:     dicyclomine (BENTYL) 20 MG tablet, TAKE 1 TABLET BY MOUTH EVERY 6 (SIX) HOURS AS NEEDED FOR IRRITABLE BOWEL SYMPTOMS, Disp: 360 tablet, Rfl: 2    diphenoxylate-atropine (LOMOTIL) 2.5-0.025 MG per tablet, TAKE 2 TABLETS BY MOUTH 4 TIMES A DAY, Disp: 240 tablet, Rfl: 11    Enoxaparin Sodium (LOVENOX) 100 MG/ML solution prefilled syringe syringe, INJECT 1 ML UNDER THE SKIN INTO THE APPROPRIATE AREA AS DIRECTED EVERY 12 (TWELVE) HOURS., Disp: 60 mL, Rfl: 2    escitalopram (LEXAPRO) 10 MG tablet, Take 1 tablet by mouth Daily., Disp: 90 tablet, Rfl: 1    famotidine (PEPCID) 20 MG tablet, TAKE 1 TABLET BY MOUTH TWICE A DAY, Disp: 180 tablet, Rfl: 2    hydrocortisone 2.5 % cream, APPLY RECTALLY 3 TIMES DAILY. INCLUDE APPLICATOR., Disp: , Rfl:     Hydrocortisone, Perianal, (ANUSOL-HC) 2.5 % rectal cream, Apply rectally 3 times daily.  Include applicator., Disp: 30 g, Rfl: 1    Loperamide HCl (IMODIUM PO), Take  by mouth As Needed., Disp: , Rfl:     Loratadine 10 MG capsule, Take 1 capsule by mouth Every Evening., Disp: , Rfl:     metoprolol succinate XL (TOPROL-XL) 25 MG 24 hr tablet, TAKE 0.5 TABLETS BY MOUTH EVERY NIGHT, Disp: 45 tablet, Rfl: 2    nystatin (MYCOSTATIN) 862759 UNIT/GM cream, Apply 1 Application topically to the appropriate area as directed 2 (Two) Times a Day., Disp: 30 g, Rfl: 2    nystatin-zinc oxide-hydrocortisone-bacitracin, Apply  topically to the appropriate area as directed Daily As Needed (As needed)., Disp: 60 g, Rfl: 2    octreotide (sandoSTATIN) 100 MCG/ML injection, Inject 1 mL under the skin into the appropriate area as directed 3 (Three) Times a Day., Disp: 90 mL, Rfl: 2    ondansetron (ZOFRAN) 8 MG tablet, TAKE 1 TABLET BY MOUTH THREE TIMES A DAY AS NEEDED FOR NAUSEA AND VOMITING, Disp: 60 tablet, Rfl: 1    pantoprazole (Protonix) 40 MG EC tablet, Take 1 tablet by mouth Daily., Disp: 90 tablet, Rfl: 1    rosuvastatin  "(CRESTOR) 5 MG tablet, Take 1 tablet by mouth Daily., Disp: 90 tablet, Rfl: 0    Syringe/Needle, Disp, 27G X 1/2\" 1 ML misc, Use as directed for octreotide injections, Disp: 100 each, Rfl: 3    HYDROcodone-acetaminophen (NORCO) 5-325 MG per tablet, Take 2 tablets by mouth Every 6 (Six) Hours As Needed for Moderate Pain., Disp: 90 tablet, Rfl: 0  No current facility-administered medications for this visit.    Facility-Administered Medications Ordered in Other Visits:     fluorouracil (ADRUCIL) 3,360 mg in sodium chloride 0.9 % 240 mL chemo infusion - FOR HOME USE, 1,680 mg/m2 (Treatment Plan Recorded), Intravenous, Once, Patience Lorenzo APRN, 3,360 mg at 01/29/25 1451    ALLERGIES:   No Known Allergies    SOCIAL HISTORY:       Social History     Tobacco Use    Smoking status: Every Day     Current packs/day: 1.00     Average packs/day: 1 pack/day for 25.0 years (25.0 ttl pk-yrs)     Types: Cigarettes     Passive exposure: Current    Smokeless tobacco: Never    Tobacco comments:     1 PPD/caffeine use    Vaping Use    Vaping status: Some Days    Substances: Nicotine    Devices: Disposable    Passive vaping exposure: Yes   Substance Use Topics    Alcohol use: Not Currently     Comment: RARELY    Drug use: Never         FAMILY HISTORY:   Mother has positive factor V Leiden mutation, although she did not have thrombosis, mother also is coronary disease with stenting, she also is occasional bruising.    Maternal grandmother had DVT, she had multiple surgical procedures.    Patient mother's half-brother had metastatic colon cancer at diagnosis in his 50s.    Maternal great aunt had breast cancer.           Vitals:    01/29/25 1131   BP: 113/74   Pulse: 95   Resp: 16   Temp: 98 °F (36.7 °C)   TempSrc: Oral   SpO2: 95%   Weight: 82.8 kg (182 lb 9.6 oz)   Height: 165.1 cm (65\")   PainSc:   6   PainLoc: Back  Comment: Lower left         1/29/2025    11:31 AM   Current Status   ECOG score 0     Physical Exam  Vitals " reviewed.   Constitutional:       Appearance: Normal appearance. She is well-developed.   HENT:      Head: Normocephalic and atraumatic.      Comments:         Nose: Nose normal.   Eyes:      Conjunctiva/sclera: Conjunctivae normal.   Cardiovascular:      Rate and Rhythm: Normal rate and regular rhythm.   Pulmonary:      Effort: Pulmonary effort is normal.      Breath sounds: Normal breath sounds.   Abdominal:      General: Bowel sounds are normal.      Palpations: Abdomen is soft. There is no mass.      Tenderness: There is no abdominal tenderness.   Musculoskeletal:      Right lower leg: No edema.      Left lower leg: No edema.   Skin:     Findings: Bruising (left low back and flank) present. No rash.   Neurological:      Mental Status: She is alert. Mental status is at baseline.   Psychiatric:         Mood and Affect: Mood normal. Affect is not tearful.       RECENT LABS:  Results from last 7 days   Lab Units 01/29/25  1106   WBC 10*3/mm3 6.58   NEUTROS ABS 10*3/mm3 4.81   HEMOGLOBIN g/dL 13.6   HEMATOCRIT % 41.1   PLATELETS 10*3/mm3 171     Results from last 7 days   Lab Units 01/29/25  1215   SODIUM mmol/L 143   POTASSIUM mmol/L 3.5   CHLORIDE mmol/L 107   CO2 mmol/L 23.5   BUN mg/dL 6   CREATININE mg/dL 0.58   CALCIUM mg/dL 8.3*   ALBUMIN g/dL 3.4*   BILIRUBIN mg/dL 0.2   ALK PHOS U/L 79   ALT (SGPT) U/L 11   AST (SGOT) U/L 11   GLUCOSE mg/dL 111*     Urinalysis without microscopic (no culture) - Urine, Clean Catch (01/29/2025 11:30)     IMAGING:  NM PET/CT Skull Base to Mid Thigh (08/14/2024 09:17)     NM PET/CT Skull Base to Mid Thigh (11/12/2024 14:53)     ASSESSMENT:   1.  Metastatic rectal cancer.   Initial rectal ultrasound examination reported T3N0 disease without lymphadenopathy.   CT scan of chest, abdomen and pelvis reported no lymphadenopathy in the abdomen, pelvis or chest. She does have small pulmonary nodule 6-7 mm and 2 mm with indeterminate feature. There is no solid evidence of metastatic  disease otherwise.   Based on the CT scan and rectal ultrasound imaging studies, this patient had stage IIA (T3N0M0) disease.   She had PET scan examination on 8/8/2019 which reported a hypermetabolic right upper lobe pulmonary nodule 6 mm with SUV 5.6.  This is suspicious for metastatic disease however it is too small to be biopsied.  This patient may have stage IV disease.   She initiated concurrent radiation with continuous 5-FU on 8/12/2019.  Patient finished concurrent chemoradiation therapy.  Patient underwent surgical resection of the rectal tumor and diverting loop ileostomy on 12/2/2019 with Dr. Ye.  Pathology evaluation reported residual T3 disease, with 1 out of 14 lymph nodes positive for malignancy.  Certainly this patient has at least stage IIIb rectal cancer (T3N1M0?)  On 1/22/2020 adjuvant chemotherapy FOLFOX 6 regimen initiated.   On 2/5/2022 cycle #2 was delayed secondary to neutropenia.  She was given 3 days of Granix.   Emend added with cycle 3.  With improved nausea control  Continuing home Granix x3 days following 5-FU disconnect  3/11/2020 due for cycle 4, however, she is experiencing progressive abdominal pain and occasional fevers.   CT scan performed on 3/13/2020 reveals no evidence of progressive or recurrent disease.  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  Patient hospitalized 4/24-4/26/20 after cycle 5 of chemotherapy with acute UTI.  CT abdomen/pelvis noting fluid collection in the presacral region having diminished in size compared to CT dated 3/13/2020.  Patient was evaluated by Dr. Ye while in the hospital with further plans to evaluate possible colovaginal fistula following completion of chemotherapy.  Patient did respond to IV antibiotics and discharged home on oral cefdinir.  4/29/2020 cycle 6 of FOLFOX.  Urinary symptoms have resolved   Patient seen in the [facility] ED on 5/2/2020 for what was suspected to be an allergic reaction.  She called our service on Saturday  explaining that she was experiencing hand and face swelling.  She was instructed to proceed to the emergency department at which time she was assessed and prescribed a Medrol Dosepak.  She had just completed her 5-FU and was unhooked on Friday, 5/1/2020.  Her symptoms have resolved in the office on assessment, 5/5/2020.  The patient had grade 3 hand-foot syndrome based on symptomology.  Patient had cycle #8 of 5-FU on 5/13/2020. Due to her symptoms and poor tolerance to the 5-FU, her treatment dose will be reduced 50% for oxaliplatin and infusional 5-FU.  Bolus 5-FU will be discontinued..  On 5/21/2020 patient had a PET scan and it showed no evidence of of metastatic disease in the neck, chest, abdomen or pelvis.  There was fluid accumulation/abscess.   On 5/27/2020 discussed with the patient that we can discontinue chemotherapy at this time.  She will follow-up with Dr. Ye to discuss a possibility to reverse the ileostomy.  We likely will obtain anther PET scan in 3 to 4 months for reassessment.    Patient was seen by Dr. Ye on 6/19/2019 for evaluation and to discuss possible take down of her ileostomy.  She is scheduled to have a gastrografin enema on 7/2/2020 to evaluate for a fistula, and then a colonoscopy on 7/15/2020, both done by Dr. Ye.  She states that based on the above imaging and procedure findings, she may have a more extensive surgery done or just have her ileostomy reversed, which would likely be done in August 2020.  This will all be coordinated under the care of Dr. Ye.     CT scan from 09/09/2020 and also compared to her previous PET scan examination from 05/21/2020 and also the original PET scan from 08/09/2019.  The original PET scan there is a small right upper lobe pulmonary nodule 6 mm with SUV 5.6. So in the 05/2020 PET scan the nodule is still there but activity seems much less and this most recent CT scan examination also documented the preexisting small pulmonary nodule  however there is a new left lower lobe pulmonary nodule 8 mm in size and I shared with the patient today this nodule is suspicious for metastatic disease. Laboratory studies reported minimal CEA level 1.32 on 09/09/2020. Liver function panel was only marginally abnormal with the ALT 45, the remaining is normal. However laboratory study today showed worsening AST 55, ALT 95 and alkaline phosphatase 143 but is still normal, total bilirubin 0.3.   Recommended to have repeat PET scan examination for assessment because the left lower lobe pulmonary nodule is too small to be biopsied, and if the PET scan reports hypermetabolic lesion, I recommended to have stereotactic radiation therapy. Explained to patient that she is not a really good candidate to have thoracotomy for resection because she still has unhealed wound in the abdomen. Stereotactic radiation therapy will likely be a more feasible choice.   PET scan examination obtained on 9/18/2020 showed metastatic disease.  It reported a few hypermetabolic pulmonary nodules, and some new small pulmonary nodules, all of them highly suspicious for metastatic disease.  She also has hypermetabolic activity in the abdomen and pelvic area which could be related to scar tissue from her recent surgery versus metastatic disease.  Nevertheless overall picture fits with metastatic cancer.  Discussed with the patient on 9/20/2020, we reviewed the PET scan images together, and I recommended to have systematic chemotherapy, I do not think stereotactic reading therapy to the hypermetabolic lesions in the lungs warranted at this time because even those will be treated with reading therapy, she will still need systematic chemotherapy.  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  I did discuss with the patient using anti-EGFR monoclonal antibody versus anti-VEGF monoclonal antibody.     Palliative chemotherapy cycle#1 FOLFIRI was started on  10/13/2020.  No bolus 5-FU given considering previous poor tolerance.    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  Microsatellite stable, mutation burden 5/Mb.   Discussed with the patient 10/27/2020, because of the K-s[place] mutation, she is not a candidate for anti-EGFR monoclonal antibody such as Erbitux or vectibix.  She could be a candidate for anti-VEGF monoclonal antibody, however because of active wound, she is not a candidate right now at this moment.  Patient seen in the ED for acute right neck pain on 11/16/2020.  CT angiogram as well as CT of the cervical spine performed with no acute findings but notably one of her pulmonary nodules, left upper lobe, somewhat decreased in size.  Patient given prednisone pack.  Further details below.  Cycle #6 chemotherapy will be resumed on 1/5/2021.  Started back on original irinotecan.  PET scan examination obtained on 1/12/2021 after cycle #6 chemotherapy reported improved pulmonary nodule hypermetabolic activity.  Confirmed these are metastatic lesions.  Patient is evaluated 1/19/2020, will continue cycle #7 FOLFIRI chemotherapy.  However Avastin will be on hold see next section.   Patient was reevaluated on 2/2/2021, will continue chemotherapy cycle #8 FOLFIRI.  Avastin / biosimilar will be added.    She talked to [facility] specialist in mid February 2021, and had recommended palliative chemotherapy without surgical intervention of metastatic lung lesions.   On 3/2/2021, patient will proceed with cycle #10 FOLFIRI plus bevacizumab.  After 12 cycles, plan to start maintenance treatment.  3/16/2021 cycle 11.  Plus bevacizumab.  3/30/2021 cycle 12 FOLFIRI plus bevacizumab.  Having issues with worsening nausea despite premeds with dexamethasone, Aloxi, Emend, and Zofran at home.  Patient is requesting a dose of Phenergan, which is helped her in the past.  We will give this to her with treatment today.  PET scan  examination on 4/6/2021 reported no evidence of hypermetabolic metastatic lesion.  Discussed with the patient on 4/13/2021, we reviewed the PET scan results.  We recommended to switch to maintenance chemotherapy without irinotecan.  We will continue 5-FU and Avastin every 2 weeks.  We discussed possibility of switching to oral Xeloda treatment.  Discussed with the patient for side effects more so with hand-foot syndrome.  Patient is agreeable.   Xeloda 2000 mg twice daily 7 days on, 7 days off along with Avastin initiated 4/27/2021.  After only 5 doses of Xeloda significant hand-foot syndrome, Xeloda held. Patient requesting to be transitioned back to infusional 5-FU, as she felt that she tolerated infusional 5-FU without any problem.  The patient will receive 5-FU leucovorin and Avastin every 2 weeks.  Xeloda discontinued.  5/25/2021 continued cycle #4 maintenance 5-FU, leucovorin, Avastin tolerating this much better so far, we will continue this. Recommended to have 12 cycles of maintenance chemotherapy, then obtain PET scan for reevaluation.  We could discuss further treatment plan at that time.  9/14/2021 patient due for cycle 12 of additional maintenance 5-FU/leucovorin plus Avastin.  She continues to tolerate treatment well overall.  She is anxious in the office today with her Mediport not getting blood at first and also with upcoming scans being heavy on her mind.  She was instructed to take one of her Klonopin pills while here to help calm her mind.  Heart rate did improve from 140s down to 112.  Proceed with treatment today as scheduled.  PET scan examination on 9/24/2021 reported further shrinking of the right upper lobe tiny pulmonary nodule.  No other new lesions.  Discussed with patient on 9/24/2021 about further shrinking of the right upper lobe pulmonary nodule and no evidence for other new lesions. We recommended to have chemo break for 3 months with repeated CT scan for reevaluation.  Recent CT scan  for chest 12/14/2021 and abdomen CT from 11/29/2021 reported no evidence for active disease. The right upper lobe pulmonary nodule, left lower lobe pulmonary nodule minimal about 4 mm and stable. I discussed with patient today on 12/21/2021, recommended to give her another 3 months chemotherapy holiday and obtain CT scan afterwards for reevaluation. After discussion, patient is agreeable.   CT scan examination for chest abdomen and pelvis obtained on 3/15/2022 reported a slight increase of the left upper lobe pulmonary nodule 5 mm, from previous 3 mm.  The other pulmonary nodules were stable in size.  There is also small left upper lobe groundglass changes.   Dr. Nguyen reviewed images studies with the patient 3/23/2022, compared to multiple previous images including CT chest CT and PET scan, suspected disease progression.  Discussed with the patient, and I recommended to resume maintenance chemotherapy with 5-FU plus leucovorin plus Avastin repeating every 2 weeks, and obtaining CT scan for reassessment in 3 months.  Patient is agreeable.  Patient resumed maintenance chemotherapy on 3/29/2022 with 5-FU leucovorin and Avastin.   4/26/2022, proceed with cycle #27 maintenance 5-FU, leucovorin, and Avastin.  Patient continues to tolerate treatment well.    On 5/10/2022, we will proceed ahead with cycle #28 maintenance chemotherapy.  Plan to have CT scan after cycle #30.  5/24/2022 cycle 29 maintenance chemotherapy.  Continue to tolerate well.  6/7/2022 cycle #30 maintenance chemotherapy, continuing to tolerate well.   CT scan for chest abdomen pelvis with IV contrast obtained on 6/21/2022 reported sub-6 mm pulmonary nodules either stable or slightly smaller.  We reviewed the images with the patient together.  We discussed with the patient and recommended to hold chemotherapy for now with repeating CT scan in 3 months for reassessment.  CT scan of the chest abdomen pelvis 9/13/2022 reported stable condition, no evidence  for recurrent or metastatic colon cancer.  On 1/10/2023 patient had a CT chest abdomen pelvis with reported no evidence for disease progression.  Laboratory study also showed normal CEA.   Patient had a CT scan for chest, abdomen, and pelvis obtained on 04/11/2023. This study reported very small pulmonary nodules, the right upper lobe nodule is stable to 6 mm, however, the left upper lobe and the left lower lobe nodule increased to 5 mm from previous 4 mm. I discussed with the patient today on 04/19/2023, I recommend to obtain another CT scan in 3 months for reassessment. The nodules are so small, they likely will not be picked up by PET scan examination.  CT scan examination of the chest, abdomen, and pelvis obtained on 7/7/2023 reported stable small pulmonary nodules. No evidence for disease progression or new lesions in chest, abdomen, and pelvis.  Discussed with patient today on 7/14/2023, however, patient is concerning for disease progression. I recommended to obtain Guardant Reveal for circulating tumor DNA study. If the study is positive, we will further obtain PET scan examination, otherwise, we will obtain CT scan for chest, abdomen, and pelvis in 3 months for reassessment.  The patient had CT scan for the chest, abdomen, and pelvis obtained on 10/27/2023, which reported worsening pulmonary nodules at bilateral upper lobes. Laboratory studies showed low normal CEA level and normal liver function panel. The Guardant Reveal study is still pending. I discussed this with the patient on 11/03/2023 and we shared the images, and I recommended having a PET scan examination for further assessment. I will present her case at the multimodality lung conference. If those lesions are deemed to be metastatic lesions, I recommend having stereotactic radiotherapy. I discussed it with the patient, and she voiced understanding and was agreeable with that strategy.  I presented her case at the multimodality chest conference  11/16/2023.  The consensus was for patient to receive stereotactic radiation therapy for the right upper lobe lung lesion, and the bigger left upper lobe lesion, and monitor the smaller left upper lobe lesion with further images studies down the line. Also, the conference recommended Guardant Reveal study which we already ordered. I discussed with the patient today on 11/17/2023, we explained to the patient that the opinion of the conference and she is agreeable with this and prefers this strategy because of limited toxicity compared to long term commitment to starting chemotherapy again. I recommend to obtain a CT scan for the chest, abdomen, and pelvis with both IV and oral contrast for reassessment in 3 months for reassessment of metastatic lung lesions and thickness in the pelvis where the PET scan reported a low activity SUV 2.4 in the surgical bed.   The patient had steroid-induced radiation therapy for the right upper lobe and one of the left upper lobe lesions.  Her CT scan examination on 02/02/2024 reported shrinking nodules of the right upper lobe and one of the left upper lobe lesions, both from 9 mm down to 6 mm. There are 2 stable pulmonary nodules 7 mm. Nothing new.  02/09/2024, I discussed with the patient and recommended CT scan for the chest, abdomen, and pelvis in 3 months for reassessment. Guardant Reveal study was 0% ctDNA. We will also continue laboratory studies every 3 months for Guardant Reveal, together with routine labs, CBC, CMP, and CEA.  CT scan of the chest, abdomen, and pelvis conducted on 4/23/2024 revealed stable multiple pulmonary nodules, with no other new pulmonary nodules or evidence of disease recurrence or abnormal pelvis. The patient's liver function panel was normal, and low-level CEA level was also noted. The Guardant Reveal study was able to be obtained earlier due to regulatory rules, and we hugo obtain today the Guardant reveal study, be available within 10 to 14 days.    Given the patient's metastatic liver lesion, and lung lesions from colon cancer, careful monitoring is necessary. A chest CT scan with IV contrast will be arranged in 3 months for reevaluation. The study will be reviewed again in 3 months, which will include CBC, CMP, and CEA level.   Imaging study with chest CT scan on 08/02/2024 reported multiple bilateral pulmonary nodules that are relatively stable. However, the Guardant Reveal reported positive ctDNA indicating disease progression. A subsequent PET scan examination on 08/14/2024 reported newly developed hypermetabolic pulmonary nodules. The highest SUV is 3.7, corresponding to an 8 mm new right upper lobe nodule, and there are several other hypometabolic nodules in the lungs.   8/21/2024 resumption of chemotherapy with 5-FU bevacizumab  Triage visit 8/28/2024 with intractable diarrhea that was unclear if this is secondary to chemotherapy or infectious etiology given her known chronic colitis, fever and abdominal pain.  Further management as outlined below  9/4/2024 per review today diarrhea has improved though she is having some difficulty with passage of gas and stool,?  Related to significant inflammation in the rectum versus tumor obstruction.  The passage of gas has improved in the last few days but she does still have to change positions on the toilet to get stool to pass without it being painful.  Discussed all of this with Dr. Nguyen and we will refer the patient urgently back to Dr. Ye for at the very least rectal examination in the office versus full repeated colonoscopy.  Because of the potential for examination or invasive procedures, we will hold bevacizumab today.  Because the patient had significant diarrhea last week and concerns that it may be related to chemotherapy we will decrease her 5-FU/leucovorin today by 30%.  If she does well we can go back to full dose with cycle 3.     9/18/2024 she presented for evaluation prior to cycle #3 of  palliative chemotherapy with 5-FU, leucovorin, and bevacizumab. Given her recent biopsy on 09/11/2024, it is prudent to postpone the Avastin treatment today to ensure safety.  Chemotherapy will be proceeded.      She presented for evaluation on 10/02/2024, tolerating chemotherapy except for diarrhea. This has been managed with octreotide. Proceed with cycle 4 chemotherapy today with 5-FU, leucovorin and resumption of bevacizumab.   Reevaluation 10/16/2024 due for cycle 5 5-FU, leucovorin, bevacizumab.  Due to ongoing significant diarrhea, bevacizumab will be placed on hold for potential surgical intervention.  Additionally, 5-FU will be dose reduced to 50%.  Additional adjustments as outlined below  Patient reviewed 10/30/2024 with improved tolerance to cycle five 5-FU, leucovorin.  We we will attempt to slightly escalate 5-FU dosing, increasing by 10% (40% dose reduction)  Patient did experience leakage of 5-FU, and return 10/31/2024 for evaluation of this.  She was sent for port dye study that showed correct location of her port, so 5-FU was resumed.  The patient's right rib pain is likely due to a new chair on the bone, as indicated by the PET scan. The PET scan revealed a new 2.8 x 1.3 cm groundglass opacity in the lateral aspect of the left apex with SUV 6.3. This appearance is nonspecific and may represent an infectious or inflammatory infiltrate. Continued monitoring and follow-up imaging are recommended.  The current regimen of 5-FU will be maintained, and Avastin will be reintroduced with full dose. 11/13/2024 will proceed ahead with 5-FU, leucovorin and bevacizumab.   12/4/2024 She also reports two bad days of diarrhea, likely due to Avastin. The dosage of Avastin will be reduced to 4 mg/kg. The 5-FU dosage will be increased to 1680 mg/m².   12/18/2024 proceed with 5-FU and Avastin continuing previous dose reductions as she had excellent tolerance to treatment 2 weeks ago.   12/31/2024 patient reports  tolerating chemotherapy with the same dose adjustment.  This is a 1 day early because of the closure of the New Year's day holiday tomorrow.  Next cycle will be resumed in 15 days back to her usual schedule.  Will plan to have CT scan for chest abdomen pelvis with IV contrast in early February 2024.  1/29/2025 proceed with 5-FU and bevacizumab maintaining previous dosing.    2.  Intractable diarrhea due to chemotherapy.   Patient developed diarrhea on Saturday, 8/24/2024.  Is unclear if this is related to infection as she did have fevers at the time the diarrhea began or chemotherapy.  She does have chronic colitis noted on most recent PET scan, this therefore could be diverticulitis flare  GI panel and C. difficile negative  8/29/2024 she did receive a single dose of octreotide  Begin empiric Flagyl 500 mg 3 times daily x 10 days  8/30/2024 discussed negative stool studies.  We will add Levaquin to already prescribed Flagyl to complete management of diverticulitis.  It is unclear if the patient's symptoms are related to diverticular flare given her previous abdominal pain and fever versus chemotherapy.  However, her symptoms are currently improved  9/4/2024 diarrhea has resolved.  Stools are now more soft with some form but she is having difficulty with passage of stool, having to change positions on the toilet to avoid pain.  We are referring back to Dr. Ye as detailed above.  She will finish out the Flagyl and Levaquin as prescribed having roughly 3 days left of both.  We will also adjust doses of 5-FU/leucovorin today with concern for potential chemotherapy-induced diarrhea.  If she does well this cycle we can increase back to full dose with cycle 3 per Dr. Nguyen.  On 9/18/2024 patient reports that she experienced significant diarrhea during cycle #2 chemotherapy on 09/04/2024, leading to a 30% dose reduction. Despite taking Lomotil 2 tablets four times a day, Imodium, and Pepto-Bismol, her diarrhea  persisted. She received octreotide shots twice, which improved her symptoms from watery to mushy stools but did not fully resolve the issue. She will continue with the current regimen and consider adding octreotide to her home regimen if diarrhea starts at home. The potential side effects of octreotide, including nausea, were discussed.   10/1/2024 she reports using octreotide self-injection at home, which has significantly improved her diarrhea. Most of the time, she only requires it once a day and occasionally twice a day, depending on the severity of her diarrhea. She is satisfied with the results, which have improved her quality of life and ability to eat properly.   She will also use Lomotil and the Imodium as needed.  Patient is very tearful in the office 10/16/2024 regarding debilitating diarrhea and interference with quality of life.  She questions potential surgical intervention and placement of colostomy due to ongoing pain in her rectum and burning of her skin from diarrhea.  We discussed adjustments today including increased dose of octreotide to 200 mcg 3 times daily for diarrhea.  Chemotherapy dose adjustments.  Diarrhea was slightly improved following most recent cycle of chemotherapy.  Continue supportive care  12/4/2024 patient reports that Avastin may be the agent causing her diarrhea.  So we will decrease dose by 20% and to see how things going.  Improved diarrhea with dose reduction to Avastin.  Continue previous dosing.   12/31/2024 She reports no significant diarrhea and continues to use octreotide three times a day, which appears to be effective.  1/15/2025 she does report exacerbation of diarrhea following most recent cycle which caused irritation of her internal hemorrhoids.  However, her symptoms were short-lived and resolved after a few days and she therefore wishes to maintain previous dosing  1/29/2025 she reports stable diarrhea following most recent cycle of chemotherapy, continuing  octreotide      3.   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  Low-dose Lovenox discontinued and initiated Xarelto 20 mg daily.  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  Patient held Xarelto for 2 days prior to MediPort removal from the left chest wall and placement of new port in the right chest wall on 12/18/2020.  She resumed Xarelto that evening.  Progressive swelling in the neck, face, upper extremities prompting hospitalization with CT scan showing thrombosis in the right subclavian vein and SVC.  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  Although she had been off of Xarelto for a few days, clearly Xarelto was not prohibiting progression of her thrombosis after she resumed treatment and there was some evidence to suggest thrombosis had been present at least in the innominate vein on prior scan in September but now appears chronic.  Current acute thrombosis involving SVC and right subclavian.  Patient was admitted and placed on heparin drip  Patient was evaluated by vascular surgery and intervention was not recommended for thrombolysis/thrombectomy.  On 12/22/2020, the patient developed worsening shortness of breath, increasing upper extremity edema consistent with worsening SVC syndrome.  Repeat CT angiogram chest was performed early on 12/23/2020 with findings of stable SVC and brachiocephalic vein thrombosis, no evidence of  pulmonary embolism.  Symptoms improved and patient was discharged 12/24/2020 on Lovenox 100 mg every 12 hours.    Returns 12/29/2020 for evaluation continuing Lovenox, overall tolerating it well.  No bleeding issues.  Improved edema 1/5/2021.  Will continue Lovenox weight-based every 12 hours.  On 1/19/2021 and 2/2/2021, patient reports improved facial swelling.  She however does have being bruise on her abdomen wall where she does Lovenox injection.  However she does not have a spontaneous epistaxis, gum bleeding or other bleeding.   On 2/2/2021, we discussed that side effects of Avastin/biosimilar related to thrombosis.  Since this patient already has been on Lovenox, I think the benefits from treatment for her cancer will outweigh that risk of thrombosis, will proceed ahead with Avastin.  I cautioned patient to watch out for signs of worsening thrombosis patient voiced understanding.   5/11/2021 Lovenox dosing will be adjusted due to patient's weight loss.  We discussed she needs 1 mg/kg.  Her syringes are 100 mg syringes she will do 0.9 mL subcu every 12 hours.  8/3/2021 Patient continues to have bruising on abdomen from lovenox injections.  Will need to monitor weight and adjust dosing in future if further weight loss.   Stable condition on 9/28/2021.  Continue Lovenox anticoagulation.    Patient reports no chest pain no dyspnea no leg edema. However she had bruising and also most recently abnormal small abscess for which she actually went to ER on 11/29/2021, had I&D. The wound is healing. No further drainage. Denies fever sweating or chills. After discussion, she will continue Lovenox injection for now.   On 3/23/2022, patient reports good tolerance of Lovenox.  Nevertheless she still has small quantity of pus in the left lower abdomen abscess, she squeezes it every day to prevent it became worse.  She reports no fever sweating chills.  Recommended to start Augmentin 875 mg twice a day for 7 days.  She will  continue Lovenox indefinitely.  On 4/12/2022, patient reports resolution of the small abscess at left lower abdominal wall.   On 5/10/2022, patient continues on Lovenox indefinitely.  No easy bleeding or bruising.  6/23/2022 continuing on Lovenox 1 mg/kg at a dose of 100 mg (patient weight is 96.6 kg today) every 12 hours.  On 1/17/2023, patient reports good tolerance, Lovenox will be continued.    Patient had a venous Doppler study on 02/05/2023, which reported acute left upper extremity DVT in the subclavian vein, axillary and the brachial vein, and also superficial thrombophlebitis in the basilic upper arm.   On 04/19/2023, patient reports that she was not compliant with anticoagulation at the time of recurrent DVT. She now is fully compliant and is using Lovenox.  Continues on anticoagulation without signs or symptoms of bleeding.  She does have occasional irritation and bleeding with wiping related to frequent diarrhea  Due to right sided pleuritic pain the patient was seen in the emergency department 10/22/2024 with a negative CT angiogram.  Without thrombosis.  Anticoagulation continued.  Patient had CTA again on 11/2/2024 due to continued pleuritic pain that was negative for PE.  1/29/2025 tolerating anticoagulation with Lovenox.  This will be continued.  Patient did have a fall and has bruising on her left low back and flank.  Thankfully, no other bleeding      4.  Mild anemia.   She also has history of iron deficiency.  Iron studies reveal iron saturation of 10%.  She was started on oral iron but was unable to tolerate due to GI side effects.   Status post Injectafer 2/5/2020 and 2/12/2020   Improved hemoglobin 13.5 on 1/5/2021.  3/16/2020 when hemoglobin now up to 16.2, hematocrit 47.6.  Patient admits that she has started smoking again, and I have encouraged her to quit.  She denies any snoring or sleep apnea diagnosis.  Patient is not taking oral iron.  We will closely monitor.  3/30/2020 hemoglobin  15.7, HCT 47.5.  Patient states that she has cut back significantly on her smoking, although not quit yet.  I have encouraged her to continue working on this.  We will continue to closely monitor.  Hemoglobin 14.6 on 6/8/2021 at the meantime he has worsening macrocytosis .6.    Laboratory studies 6/22/2021 reported excellent folate > 20 ng/mL, however low normal B12 level 357 pg/mL.    On 7/6/2021, normal hemoglobin 15.8, .9 and HCT 47.2%.  Patient was asked to start oral vitamin B12 at 1000 mcg daily.   9/14/2021 hemoglobin 17.0, hematocrit 52.1.  Monitor.  On 9/28/2021 hemoglobin 16.1 hematocrit 48.5%, continue to monitor.   Lab study on 12/14/2021 reported excellent ferritin 296 and iron saturation 25% with free iron 104 TIBC 424. Hemoglobin was 15.2 with .2.   Normal hemoglobin 14.6 on 3/15/2022.  Iron study reported normal ferritin 129.5 ng/mL however slightly low iron saturation 11%.  Continue to monitor for now.  25 hemoglobin is normal at 13.6    5.  Peripheral neuropathy secondary to chemotherapy.   This is mild involving bilateral hands and feet as reported on 9/16/2020.   Will avoid chemotherapy with oxaliplatin.  Stable mild neuropathy as of 11/10/2020  Patient reports worsening peripheral neuropathy and cycle #5 chemotherapy on 12/8/2020 with and without irinotecan.   On 1/5/2021, patient reports improvement of peripheral neuropathy, will add irinotecan back to chemotherapy.  Continue to monitor and adjust medication.  Stable.      6.  Depression.  Patient seen by JULIET Davenport on 11/9/2020.  She was started on mirtazapine.  Lexapro was discontinued.  This has definitely improved her mood with the patient feeling overall much better.  She is sleeping better.  Appetite is improved with her actually eating more than she wants to.    Condition is stable.  She plans to continue follow-up with supportive oncology.      7. Malfunction of the portal catheter  Patient reports there  was no blood drawn from the portal catheter. CT scan of the chest on 7/7/2023 reported occlusion of the SVC around to the Port-A-Cath tubing. I recommend trying activase to see if it helps.  Otherwise, we may have to remove the catheter. Clinically, patient has no signs of SVC syndrome.  On 11/03/2023, the patient reports that her Port-A-Cath was able to be used for a CT scan for the injection of intravenous dye; however, there was no blood return, so we needed to draw from her arm for the blood test. We will continue to keep Port-A-Cath for now.  On 02/09/2024, the patient reports that the port was able to be flushed. However, no blood was returned. We will continue to flush every 6 weeks.  9/4/2024 she continues to have no blood return from her port but can taste saline as we flush at each time and is functioning well otherwise.  Monitor.  10/16/2024 no specific problem.  Port dye study 10/31/2024 showing correct position of port.  Fibrin sheath present.  No issue today.    8. Internal hemorrhoids.  She has large internal hemorrhoids diagnosed during a flexible sigmoidoscopy on 09/11/2024. Hydrocortisone cream was prescribed for treatment.   Not active problem today.    9.  This patient also has strong family history for malignancy   The patient had appointment with genetic counseling on September 3, 2019.  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    10. Hypertension.  Continue management of hypertension and follow up with PCP.      11.  Chest and back pain  Ports this has been ongoing for about 4 weeks.  Started in her right chest/rib cage.  Originally this pain had completely resolved, however returned when she received her next chemotherapy infusion. She felt like the pain worsened after she was unhooked from her 5-FU and that the pain spread to her right scapular/back area.  Since unhook she has continued with pain in her right chest/back that has required her to take pain medicine regularly which is not  typical for her.  She has pain with deep inspiration and pain with certain movements.  She also has pain when she pushes on the tissue around her port, though there is no redness or warmth around her port site aside from red rash in the shape of a bandaid and she reports being sensitive to bandaids.  Reviewed all of patients symptoms with Dr. Nguyen who recommended obtaining doppler right upper extremity and PET scan for further evaluation of this severe right sided pain that has been evaluated by CTA without any signs of what could be causing her pain.  Of note she was seen in the ED 11/2/2024, had CT chest performed which was negative for PE.  EKG and troponin looked okay.  She was discharged home.    Healing fracture of the right 2nd rib. The PET scan on 11/12/2024 showed new hypermetabolic activity at a healing fracture on the anterior aspect of the right second rib with SUV 5.7. The patient should avoid activities that may exacerbate the pain and consider pain management options as needed.    12/4/2024 the patient reports improvement in rib pain, with the ability to lay on the affected side, though still experiencing some soreness and pain upon sneezing or deep breathing.    Not active problem today.        PLAN:    Proceed today with palliative chemotherapy with 5-FU, leucovorin and bevacizumab, maintaining previous dosing.  Avastin will be maintained at 4 mg/kg, and 5-FU will be maintained at 1680 mg/m².   Patient return in 2 days to discontinue 5-FU infusion.  Continue Norco 5/325 as needed for pain.  1 to 2 tablets.  The patient was encouraged to utilize this for her musculoskeletal pain following her fall  Continue octreotide, currently utilizing 100 mcg every 8 hours following chemotherapy  Continue Imodium and Lomotil as needed.  Continue Magic barrier cream as needed which was refilled today  Continue Protonix 40 mg daily.   Continue Gas-X.   Continue anticoagulation with Lovenox subcu injection every 12  hours.   Guardant reveal is pending today  Will arrange patient have a CT scan for chest abdomen pelvis with IV contrast in early February 2025 for reassessment.  These are ordered, though are not currently scheduled.  I will reach out to scheduling  Follow-up with Dr. Nguyen 2/12/2025 to discuss results of laboratory studies and CT scan images, and to discuss further management plan.      The patient is on a high risk medication requiring close monitoring for toxicity.       Patience Lorenzo, APRN  01/29/2025        CC:  Deepika Vela III NP-C   MD Alverto Bowers MD

## 2025-01-30 ENCOUNTER — PRIOR AUTHORIZATION (OUTPATIENT)
Dept: ONCOLOGY | Facility: HOSPITAL | Age: 45
End: 2025-01-30
Payer: COMMERCIAL

## 2025-01-30 NOTE — TELEPHONE ENCOUNTER
Outcome  Approved today by Janina [employer] 2017  Your PA request has been approved. Additional information will be provided in the approval communication. (Message [id number])  Authorization Expiration Date: 1/30/2026  Drug  HYDROcodone-Acetaminophen 5-325MG tablets  ePA cloud logo  Form  [employer] Electronic PA Form (2017 [employer])

## 2025-01-31 ENCOUNTER — INFUSION (OUTPATIENT)
Dept: ONCOLOGY | Facility: HOSPITAL | Age: 45
End: 2025-01-31
Payer: COMMERCIAL

## 2025-01-31 ENCOUNTER — HOSPITAL ENCOUNTER (OUTPATIENT)
Dept: PET IMAGING | Facility: HOSPITAL | Age: 45
Discharge: HOME | End: 2025-01-31
Admitting: INTERNAL MEDICINE
Payer: COMMERCIAL

## 2025-01-31 DIAGNOSIS — Z45.2 ENCOUNTER FOR FITTING AND ADJUSTMENT OF VASCULAR CATHETER: ICD-10-CM

## 2025-01-31 DIAGNOSIS — C20 ADENOCARCINOMA OF RECTUM: ICD-10-CM

## 2025-01-31 DIAGNOSIS — C78.00 MALIGNANT NEOPLASM METASTATIC TO LUNG, UNSPECIFIED LATERALITY: ICD-10-CM

## 2025-01-31 DIAGNOSIS — Z79.899 ENCOUNTER FOR LONG-TERM (CURRENT) USE OF HIGH-RISK MEDICATION: Primary | ICD-10-CM

## 2025-01-31 PROCEDURE — 25010000002 HEPARIN LOCK FLUSH PER 10 UNITS: Performed by: INTERNAL MEDICINE

## 2025-01-31 PROCEDURE — 25510000002 DIATRIZOATE MEGLUMINE & SODIUM PER 1 ML: Performed by: INTERNAL MEDICINE

## 2025-01-31 PROCEDURE — 25510000001 IOPAMIDOL 61 % SOLUTION: Performed by: INTERNAL MEDICINE

## 2025-01-31 PROCEDURE — 71260 CT THORAX DX C+: CPT

## 2025-01-31 PROCEDURE — 74177 CT ABD & PELVIS W/CONTRAST: CPT

## 2025-01-31 RX ORDER — SODIUM CHLORIDE 0.9 % (FLUSH) 0.9 %
10 SYRINGE (ML) INJECTION AS NEEDED
Status: DISCONTINUED | OUTPATIENT
Start: 2025-01-31 | End: 2025-01-31 | Stop reason: HOSPADM

## 2025-01-31 RX ORDER — HEPARIN SODIUM (PORCINE) LOCK FLUSH IV SOLN 100 UNIT/ML 100 UNIT/ML
500 SOLUTION INTRAVENOUS AS NEEDED
Status: DISCONTINUED | OUTPATIENT
Start: 2025-01-31 | End: 2025-01-31 | Stop reason: HOSPADM

## 2025-01-31 RX ORDER — HEPARIN SODIUM (PORCINE) LOCK FLUSH IV SOLN 100 UNIT/ML 100 UNIT/ML
500 SOLUTION INTRAVENOUS AS NEEDED
OUTPATIENT
Start: 2025-01-31

## 2025-01-31 RX ORDER — OCTREOTIDE ACETATE 100 UG/ML
100 INJECTION, SOLUTION INTRAVENOUS; SUBCUTANEOUS 3 TIMES DAILY
Qty: 90 ML | Refills: 2 | Status: SHIPPED | OUTPATIENT
Start: 2025-01-31

## 2025-01-31 RX ORDER — DIATRIZOATE MEGLUMINE AND DIATRIZOATE SODIUM 660; 100 MG/ML; MG/ML
30 SOLUTION ORAL; RECTAL
Status: COMPLETED | OUTPATIENT
Start: 2025-01-31 | End: 2025-01-31

## 2025-01-31 RX ORDER — IOPAMIDOL 612 MG/ML
100 INJECTION, SOLUTION INTRAVASCULAR
Status: COMPLETED | OUTPATIENT
Start: 2025-01-31 | End: 2025-01-31

## 2025-01-31 RX ORDER — SODIUM CHLORIDE 0.9 % (FLUSH) 0.9 %
10 SYRINGE (ML) INJECTION AS NEEDED
OUTPATIENT
Start: 2025-01-31

## 2025-01-31 RX ADMIN — IOPAMIDOL 85 ML: 612 INJECTION, SOLUTION INTRAVENOUS at 11:30

## 2025-01-31 RX ADMIN — Medication 10 ML: at 11:59

## 2025-01-31 RX ADMIN — DIATRIZOATE MEGLUMINE AND DIATRIZOATE SODIUM 30 ML: 660; 100 LIQUID ORAL; RECTAL at 10:32

## 2025-01-31 RX ADMIN — Medication 500 UNITS: at 12:00

## 2025-02-12 ENCOUNTER — TELEMEDICINE (OUTPATIENT)
Dept: ONCOLOGY | Facility: CLINIC | Age: 45
End: 2025-02-12
Payer: COMMERCIAL

## 2025-02-12 ENCOUNTER — TELEPHONE (OUTPATIENT)
Dept: ONCOLOGY | Facility: CLINIC | Age: 45
End: 2025-02-12
Payer: COMMERCIAL

## 2025-02-12 DIAGNOSIS — Z79.01 CHRONIC ANTICOAGULATION: Chronic | ICD-10-CM

## 2025-02-12 DIAGNOSIS — Z86.711 HISTORY OF PULMONARY EMBOLISM: ICD-10-CM

## 2025-02-12 DIAGNOSIS — C78.02 MALIGNANT NEOPLASM METASTATIC TO BOTH LUNGS: ICD-10-CM

## 2025-02-12 DIAGNOSIS — C20 ADENOCARCINOMA OF RECTUM: Primary | Chronic | ICD-10-CM

## 2025-02-12 DIAGNOSIS — Z79.899 ENCOUNTER FOR LONG-TERM (CURRENT) USE OF HIGH-RISK MEDICATION: Primary | ICD-10-CM

## 2025-02-12 DIAGNOSIS — T45.1X5A CHEMOTHERAPY INDUCED DIARRHEA: ICD-10-CM

## 2025-02-12 DIAGNOSIS — E86.0 DEHYDRATION: ICD-10-CM

## 2025-02-12 DIAGNOSIS — K52.1 CHEMOTHERAPY INDUCED DIARRHEA: ICD-10-CM

## 2025-02-12 DIAGNOSIS — C20 ADENOCARCINOMA OF RECTUM: ICD-10-CM

## 2025-02-12 DIAGNOSIS — C78.00 MALIGNANT NEOPLASM METASTATIC TO LUNG, UNSPECIFIED LATERALITY: ICD-10-CM

## 2025-02-12 DIAGNOSIS — C78.01 MALIGNANT NEOPLASM METASTATIC TO BOTH LUNGS: ICD-10-CM

## 2025-02-12 DIAGNOSIS — T45.1X5D ADVERSE EFFECT OF ANTINEOPLASTIC AND IMMUNOSUPPRESSIVE DRUGS, SUBSEQUENT ENCOUNTER: ICD-10-CM

## 2025-02-12 DIAGNOSIS — D68.51 HETEROZYGOUS FACTOR V LEIDEN MUTATION: Chronic | ICD-10-CM

## 2025-02-12 DIAGNOSIS — R19.7 INTRACTABLE DIARRHEA: ICD-10-CM

## 2025-02-12 PROCEDURE — 99215 OFFICE O/P EST HI 40 MIN: CPT | Performed by: INTERNAL MEDICINE

## 2025-02-12 NOTE — TELEPHONE ENCOUNTER
Patient calling stating she has had diarrhea since Saturday and the Octreotide, Lomotil and Imodium are not helping. Patient states she is unable to come to her appointment today.   Dr Nguyen made aware and has agreed to do a MyChart video visit to review patient's scans. Patient is also scheduled for Labs and IV Hydration on Thursday 2/13/25 due to she is at risk of dehydration due to the issue of ongoing diarrhea.

## 2025-02-12 NOTE — PROGRESS NOTES
REASON FOR FOLLOW UP:     Rectal cancer, rectal ultrasound examination in July 2019 reported T3N0 disease without lymphadenopathy. She does have small pulmonary nodule 6-7 mm and 2 mm with indeterminate feature. There is no solid evidence of metastatic disease otherwise. Patient has stage IIA (T3N0M0) disease.  The patient is heterozygous factor V Leiden, was on prophylactic anticoagulation with Lovenox 40 mg daily given her increased risk of thrombosis with MediPort and GI malignancy.   PET scan on 8/8/2019 reported an 8 mm hypermetabolic right upper lobe pulmonary nodule, which is suspicious for metastatic as well.    Patient had a PowerPort placement on the left upper chest by Dr. Joseph on 8/9/2019.  Patient was started on concurrent infusional 5-FU chemoradiation therapy on 8/12/2019, with planned complete surgical resection and further adjuvant chemotherapy with intention to cure the disease.   Patient underwent surgical resection of the primary rectal cancer by Dr. Ye on 12/2/2019.  Pathology evaluation reported residual T3N1 disease stage IIIb.  Diarrhea related to therapy and radiation.   Patient was started cycle 1 day 1 of adjuvant FOLFOX 6 on 1/23/2020.  On 2/5/2020 FOLFOX 6 cycle 2 delayed secondary to neutropenia.  Patient was given 3 days of Granix injection.  After cycle #2 we planned 3 days of Granix with each cycle.   Patient also intolerant of oral iron.  Patient received 2 doses of IV injectafer on 02/05/2020 and 02/12/2020.   02/12/2020 Proceed with cycle #2 of FOLFOX 6 with 3 days of Granix.  On 3/11/2020 cycle 4 postponed secondary to abdominal pain and occasional low-grade fevers.  CT scans ordered.  Cycle #4 resumed after CT scan revealed no evidence of disease.  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 Ye. patient has no fever.  Continue to observe.   Grade 3 hand-foot syndrome secondary to 5-FU after cycle #7  FOLFOX 6 chemotherapy, prompting ER visit.  Also has worsening peripheral neuropathy.   Cycle #8 FOLFOX 6 was given on 5/13/2020, with 50% dose reduction for both 5-FU and oxaliplatin, and also examination of bolus 5-FU.   PET scan examination on 5/21/2020 reported no evidence of metastatic disease in the chest abdomen pelvis.  Stable 6 mm RUL pulmonary nodule.  On 5/27/2020, I discussed with the patient that we can discontinue chemotherapy at this time.   Patient had a surgical procedure for low anterior colon resection, coloanal anastomosis on 8/3/2020.  CT scan for chest abdomen pelvis on 9/9/2020 reported a new 8 mm noncalcified pulmonary nodule in the left lower lobe of the lung.  Otherwise stable right upper lobe 6 mm pulmonary nodule, and no evidence of disease recurrence in the abdomen or pelvis.  PET scan examination on 9/18/2020 reported multiple hypermetabolic small pulmonary nodules/ new pulmonary nodules.   Patient was started on pelvic chemotherapy with FOLFIRI regimen on 10/13/2020.   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 It reported tumor mutation burden 5 Muts/Mb, microsatellite stable, TP53 mutation R282W, and others.   Cycle #5 was without irinotecan, due to peripheral neuropathy.  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  Patient was switched to weight-based Lovenox injection.  Cycle #6 5-FU and irinotecan was delayed by 2 weeks because of the above incident.  Patient had a telemedicine evaluation at that the [facility] cancer [place] in February 2021.  They agreed with our treatment plan for palliative chemotherapy followed by maintenance chemotherapy.    PET scan examination on 4/6/2021 after cycle #12 palliative chemotherapy reported no evidence of hypermetabolic metastatic lesion.   Patient was started on maintenance chemotherapy with 5-FU and  Avastin on 4/13/2021. (Unable to tolerate Xeloda because of a significant hand-foot syndrome).   PET scan on 9/24/2021 obtained after cycle #12 maintenance chemotherapy with 5-FU, leucovorin, Avastin reported no evidence for active disease. We recommended 3 months chemotherapy holiday.  CT scan examination on 3/15/2022 reported slightly disease progression with increase in size of small pulmonary nodule.  We started patient back on maintenance chemotherapy on 3/29/2022 treatment with 5-FU plus leucovorin and Avastin, repeating every 2 weeks.  After 6 more maintenance chemotherapy, CT scan for chest abdomen pelvis was done on 6/21/2022 which reported stable sub-6 mm pulmonary nodules.  Patient had last maintenance chemotherapy on 6/7/2022.   Disease progression, patient was restarted back on chemotherapy with 5-FU leucovorin and bevacizumab 8/21/2024      HISTORY OF PRESENT ILLNESS:  The patient is a 44 y.o. female with the above-mentioned history, who is seen today for evaluation.     Patient presented today 2/12/2025 for telemedicine visit via video conference.  Patient is at her home, and I am in main clinic at [facility].      She was already scheduled for a visit in clinic today for evaluation before chemotherapy, however because of profound diarrhea, she was not able to come in.  Today she reports slightly improvement this afternoon and arranged this telemedicine to discuss results of CT scan examination and ongoing management plan.    Patient reports usually she responded well to octreotide injection for chemotherapy-induced diarrhea.  However after this last chemotherapy 2 weeks ago, she developed from the diarrhea and not responding that well to octreotide.  She has very frequent watery diarrhea, which causes severe pain in the anal area due to repeated wiping despite using very soft baby wipe.  She denies fever sweating chills.    Her condition only slightly improved this  morning.    CT scan for chest abdomen pelvis with IV contrast obtained 1/31/2025 reported stable small pulmonary nodules up to 0.6 cm.  There was stable presacral soft tissue thickening.  There is chronic central venous occlusion.      Past Medical History:   Diagnosis Date    Abdominopelvic abscess 08/12/2020    ADMITTED TO [facility]    Abnormal Pap smear of cervix 02/02/1998    JULIUS I    Abscess of abdominal wall 11/28/2012    SEEN AT [facility] ER    Anemia in pregnancy     Anxiety     Bilateral epiphora 04/2020    Bilateral hand swelling 05/02/2020    SEEN AT [facility] ER    Cervical lymphadenitis 06/06/2012    SEEN AT [facility] ER    Chemotherapy induced diarrhea 01/2021    Chemotherapy induced neutropenia 04/2020    Chemotherapy-induced nausea 02/2020    Chemotherapy-induced thrombocytopenia 05/2020    Chronic anticoagulation     Chronic diarrhea     Colon polyps     FOLLOWED BY DR. 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 ALEXANDRU HUNT    History of gestational diabetes     History of pre-eclampsia 1998    History of pre-eclampsia     History of radiation therapy 2019    FOLLOWED BY DR. JAVON LEWIS    History of TB skin testing 01/17/2009    TB Skin Test    History of TB skin testing 01/17/2009    TB Skin Test    History of transfusion 2019    12/2019    HPV in female 1998    Hypokalemia 09/2019    Hypomagnesemia 09/2019    Hyponatremia 06/2021    IBS  (irritable bowel syndrome)     Ileostomy present 2020    Take down on 11/3/2022    Infected insect bite of neck 2016    SEEN AT [facility]    Kidney stones 2007    SEEN AT [facility] ER    Low anterior resection syndrome 2022    FOLLOWED BY DR. PADMAJA ESPARZA    Lump of right breast     SWOLLEN LYMPH NODE    Lung cancer 2020    METASTATIC LUNG CANCER    Macrocytosis 2021    Monopolar depression     On anticoagulant therapy     Pain associated with surgical procedure 2020    Palmar-plantar erythrodysesthesia 2021    Perirectal abscess 2020    Pulmonary embolism without acute cor pulmonale 09/20/2019    X 3; ADMITTED TO [facility]    Pulmonary nodule, right 2020    Rectal cancer 2019    STAGE IIA, INVASIVE MODERATELY DIFFERENTIATED ADENOCARCINOMA, CHEMO AND XRT FINISHED 2019    Right shoulder pain 2020    SEEN AT [facility] ER    Right ureteral stone 10/01/2019    SEEN AT [facility] ER    SAB (spontaneous ) 1996     A2-1 INDUCED    Sciatica     Sepsis due to cellulitis 2002    LEFT GREAT TOE, ADMITTED TO [facility]    Tachycardia 2020    Thrombosis of superior vena cava 2020    AND BRACHIOCEPHALIC VEIN, ADMITTED TO [facility]    Urinary urgency 2020   Patient had COVID-19 infection diagnosed on 2022 despite was fully vaccinated and boosted.  She recovered without problem.      Past Surgical History:   Procedure Laterality Date    ABDOMINAL SURGERY      CHOLECYSTECTOMY N/A 10/10/2003    LAPAROSCOPIC WITH CHOLANGIOGRAM, DR. JAMEY TALAVERA AT [facility]    COLON RESECTION N/A 2019    Procedure: laparoscopic low anterior colon resection with mobilization of splenic flexure and diverting loop ileostomy: ERAS;  Surgeon: Padmaja Esparza MD;  Location: [facility] OR;  Service: General    COLON RESECTION N/A 2020    Procedure: LOW ANTERIOR COLON RESECTION, COLOANAL ANASTOMOSIS, MOBILIZATION SPLENIC FLEXURE;  Surgeon: Padmaja Esparza MD;  Location: [facility]  MAIN OR;  Service: General;  Laterality: N/A;    COLONOSCOPY N/A 07/15/2020    PATENT ANASTAMOSIS IN MID RECTUM, RESCOPE IN 1 YR, DR. PADMAJA ESPARZA AT [facility]    COLONOSCOPY N/A 11/03/2022    DIFFUSE AREA OF MODERATELY FRIABLE MUCOSA IN ENTIRE COLON , DIVERSION COLITIS, PATENT AND HEALTHY ANASTAMOSIS, DR. PADMAJA ESPARZA AT [facility]    COLONOSCOPY N/A 12/04/2023    6 MM SESSILE SERRATED ADENOMA POLYP IN DESCENDING, PATENT ANASTAMOSIS IN DISTAL RECTUM, RESCOPE IN 2 YRS, DR. PADMAJA ESPARZA AT [facility]    COLONOSCOPY W/ POLYPECTOMY N/A 07/08/2019    15 MM TUBULOVILLOUS ADENOMA POLYP IN HEPATIC FLEXURE, 20 MMTUBULOVILLOUS ADENOMA WITH HIGH GRADE DYSPLASIA IN RECTOSIGMOID, 4 CM MASS IN MID RECTUM, PATH: INVASIVE MODERATELY DIFFERENTIATED ADENOCARCINOMA, DR. JENNIFER LI AT [facility]    DILATATION AND EVACUATION N/A 2009    ENDOSCOPY N/A 07/08/2019    LA GRADE A ESOPHAGITIS, GASTRITIS, ALL BIOPSIES BENIGN, DR. JENNIFER LI AT [facility]    ILEOSTOMY CLOSURE N/A 11/14/2022    Procedure: ILEOSTOMY TAKEDOWN;  Surgeon: Padmaja Esparza MD;  Location: [facility] OR;  Service: General;  Laterality: N/A;    INCISION AND DRAINAGE PERIRECTAL ABSCESS N/A 08/14/2020    Procedure: INCISION AND DRAINAGE OF retrorectal dehiscence abcess with drain placement and irrigation;  Surgeon: Padmaja Esparza MD;  Location: [facility] OR;  Service: General;  Laterality: N/A;    PAP SMEAR N/A 01/24/2014    SIGMOIDOSCOPY N/A 07/24/2019    INFILTRATIVE PARTIALLY OBSTRUCTING LARGE RECTAL CANCER, AREA TATOOED, DR. PADMAJA ESPARZA AT [facility]    SIGMOIDOSCOPY N/A 11/23/2019    AN ULCERATED PARTIALLY OBSTRUCTING MASS IN MID RECTUM, AREA TATTOOED, DR. PADMAJA ESPARZA AT [facility]    SIGMOIDOSCOPY N/A 07/22/2021    PATENT ANASTAMOSIS IN DISTAL RECTUM, RESCOPE IN 1 YR, DR. PADMAJA ESPARZA AT [facility]    SIGMOIDOSCOPY N/A 09/11/2024    PATCHY INFLAMMATION AND EDEMA IN DESCENDING, AREA BIOPSIED AND WAS BENIGN, PATENT AND HEALTHY ANASTAMOSIS, HEMORRHOIDS,RESCOPE(CY) 12/2025,  DR. GERONIMO YE AT [facility]    TRANSRECTAL ULTRASOUND N/A 07/24/2019    Procedure: ULTRASOUND TRANSRECTAL;  Surgeon: Geronimo Ye MD;  Location: [facility] ENDOSCOPY;  Service: General    URETEROSCOPY LASER LITHOTRIPSY WITH STENT INSERTION Right 10/30/2019    DR. ESTUARDO BEASLEY AT [facility]    VAGINAL DELIVERY N/A 09/18/1998    VENOUS ACCESS DEVICE (PORT) INSERTION Left 08/09/2019    Procedure: INSERTION VENOUS ACCESS DEVICE;  Surgeon: Sj Joseph MD;  Location: [facility] OR [facility];  Service: General    VENOUS ACCESS DEVICE (PORT) INSERTION N/A 12/18/2020    Procedure: INSERTION VENOUS ACCESS DEVICE right side, removal venous access device left side;  Surgeon: Sj Joseph MD;  Location: [facility] OR [facility];  Service: General;  Laterality: N/A;    WISDOM TOOTH EXTRACTION Bilateral 1993       HEMATOLOGIC/ONCOLOGIC HISTORY:      The patient reports she has intermittent rectal bleeding and urgency, mucous with her stool, starting sometime in 2016. At that time she was referred to GI service, and was diagnosed of irritable bowel syndrome. Nevertheless she had worsening urgency for bowel movement, and had a couple of incidents losing control of stool. She was recently seen by Roland Thorpe MD again and had colonoscopy and EGD exam on 07/08/2019. She was found having a circumferential rectal mass. According to the procedure note, the patient had a fungating circumferential bleeding 4 cm mass in the middle rectum, at distance between 13 cm and 17 cm from the anus. Mass was causing partial obstruction. There were also colon polyps found at the hepatic flexure and also at the rectosigmoid junction 23 cm from the anus. Both were resected and retrieved. EGD examination reported grade A esophagitis at the GE junction and patchy discontinuous erythema and aggravation of the mucosa without bleeding in the stomach body. There is normal mucosa of the duodenum. Pathology evaluation from this procedure reported moderately differentiated  adenocarcinoma involving the rectal mass. The rectal sigmoid junction polyp was tubular/tubulovillous adenoma with high grade dysplasia negative for invasive malignancy. The hepatic flexure polyp was also tubular/tubulovillous adenoma negative for high grade dysplasia or malignancy. The biopsy from the esophagus reports squamocolumnar mucosa with inflammatory changes suggestive of mild reflux esophagitis, negative for interstitial metaplasia. Gastric biopsy was benign and duodenal biopsy was also benign. There is no mention of H-pylori.     Patient was subsequently referred to colorectal surgeon Padmaja Ye MD for further evaluation. The patient had CT scan examination for chest, abdomen and pelvis requested by Dr. Ye and were done on 07/13/2019. The study reported no evidence of lymphadenopathy in the abdomen and pelvis. There was wall thickening of the rectosigmoid junction. Normal spleen, pancreas, adrenal glands and kidneys. There was fatty liver infiltration but no focal lymphatic lesions. There was a small 6-7 mm noncalcified nodule in the right upper lobe and a tiny 3 mm subpleural nodule in the right middle lobe. No mediastinal or hilar lymphadenopathy.     Dr. Ye performed staging rectal ultrasound examination. This study reported tumor penetrating through the muscularis propria as T3 disease; there were no lymph nodes identified.    She had a hospitalization in late September 2019 because of newly discovered pulmonary emboli.  She was on prophylactic Lovenox prior to the incident of PE.  Now she is on full dose Xarelto anticoagulation.  Patient reports no further chest pain dyspnea.  She denies bleeding or bruising.  During her hospitalization, she was seen by Dr. eY, who plans to have surgery 8 to 12 weeks after finishing radiation therapy.  She finished her radiation on 9/19/2019.     I noticed patient also presented to the emergency room on 10/1/2019 complaining of right flank area pain.  I  reviewed the images studies and indeed she has a very small 1 to 2 mm obstructing kidney stone in the UV junction.  Patient is still symptomatic with some pains and dysuria.  She denies fever sweating or chills.    Laboratory study on 10/7/2019 reported normal CBC including hemoglobin 13.1, platelets 301,000, WBC 6170 and ANC 4900 lymphocytes 590 monocytes 480.      The nurse reported malfunction of port-a-catheter on 10/7/2019.  Port study on 10/8/2019 showed fibrin sheath around the distal aspect of the Mediport catheter in the SVC. This does not appear to hinder injection, but does prevent aspiration at this time.    Patient underwent surgical resection of the colon on 12/2/2019 with Dr. Ye.  Pathology evaluation reported residual T3 disease, also 1 out of 14 lymph nodes positive for malignancy.  So this patient in either had at least stage IIIb disease (T3 N1 M0?).      Adjuvant chemotherapy FOLFOX 6 will be started on 1/22/2020.  Laboratory study reported iron saturation 10%, free iron 31 TIBC 319 and ferritin 168.  Her hemoglobin was 11.8, WBC 5600, and platelets 347,000.    Patient was here on 02/12/2020 for cycle 2 of her FOLFOX.  This is delayed x1 week secondary to neutropenia.  The patient ultimately received 3 days worth of Neupogen with recovery of her blood counts.  Of note, the patient struggled with significant nausea without any episodes of vomiting following cycle #1 of chemotherapy resulting in significant weight loss.  She is up 12 pounds over the last week as her appetite has normalized.  We will add Emend to her care plan.    She is having several loose stools per her colostomy and has been hesitant to take Imodium due to prior history of constipation.  I encouraged her to try this with a maximum of 8 tablets/day.  She denies any infectious symptoms including fevers or chills.  No excess bleeding or bruising noted.  She had the expected cold sensitivity related to the Oxaliplatin for about 3  days following treatment.    Labs from 02/12/2020 demonstrated total white blood cell count of 5.14, ANC of 3.06, hemoglobin of 11.2, platelets of 211,000.  She was found to be iron deficient last week and is intolerant to oral iron secondary to GI distress.  For this reason, she initiated IV iron therapy with Injectafer last week.  She had received her second dose 02/12/2020    Patient has normalized hemoglobin 12.2 on 2/26/2020.    She reported on 5/5/2020 she had a recent visit to the emergency department for what was suspected to be an allergic reaction.  She called our on-call service on Saturday with reports of hand and face swelling.  She was instructed to proceed to the emergency department at which time she was assessed and prescribed a Medrol Dosepak.  She had just completed her 5-FU and was unhooked on Friday, 5/3/2020.  Her symptoms have improved.  She does report persistent hyperpigmentation and mild swelling of the palms of the hands but this is much improved.  It was her right hand specifically that was swollen.  Her facial swelling has resolved.  She continues on Cefdinir nd since has 1 day remaining, she was prescribed cefdinir for a UTI requiring hospitalization at the end of April.  Reports no new symptoms.  Her labs are stable.  She is scheduled for treatment again.    Patient states at this time she is not tolerating her chemotherapy well.     Patient seen in the emergency department on 5/2/2020 for what was suspected to be an allergic reaction.  She called our on-call service on Saturday explaining that she was experiencing hand and face swelling.  She was instructed to proceed to the emergency department at which time she was assessed and prescribed a Medrol Dosepak.  She had just completed her 5-FU and was unhooked on Friday, 5/1/2020.      She reports since her ED visit on 5/2/2020 her symptoms have not improved. Her hands and feet were swollen upon presenting to the ED and she could barely  make a fist. She states that she feels so swollen she is not able to stand it. She states that her skin on the hands and feet are peeling extensively as well, besides changing color to more dark.     She also reports significant fatigue after her first week of the 5-FU treatment but she expected this side effect. She also notices significant increase in her neuropathy to the point that she is not able to even walk around in her home without her house slippers due to irritation from her rugs. She denies and associated nausea or vomiting at this time. She also has episodes of epistaxis every day after the chemotherapy cycle #7.  She does report working full-time during the week of chemotherapy.    Laboratory studies, 5/13/2020, show moderate thrombocytopenia platelets 123,000, low normal WBC 4140 including ANC 2720 and normal hemoglobin 13.4.  Because significant hand-foot syndrome, will decrease both 5-FU and oxaliplatin by 50%, and eliminate bolus dose of 5-FU.    On 5/21/2020 patient had a PET scan performed which showed no convincing evidence of residual disease in abdomen or pelvis, no metastatic disease within the chest or neck.     Cycle #8 FOLFOX 6 was given on 5/13/2020, with 50% dose reduction for both 5-FU and oxaliplatin, and also examination of bolus 5-FU.  She states on 5/27/2020 that with the recent reduction of the chemotherapy she feels significantly better. She has more energy and is able to do her daily routine.      PET scan examination on 5/21/2020 reported no evidence of metastatic disease in chest abdomen pelvis.  There was a stable 6 mm right upper lobe pulmonary nodule.    Laboratory studies on 5/27/2020 showed mild leukocytopenia WBC 3720 but a normal ANC 2250 and lymphocytes 630.  Normal platelets 163,000 and hemoglobin 12.6.  Chemistry lab reported normal renal function, liver function panel and a electrolytes, elevated glucose 164.    Laboratory studies 6/24/2020, showed normal hemoglobin  13.4 but macrocytosis .9.  Normal platelets 210,000 and WBC 5870.  She had normal CMP.     Patient last time was here in late June 2020.  Since that time she had surgical procedure for low anterior colon resection, coloanal anastomosis on 8/3/2020.  She later developed a perirectal abscess, required surgical drainage on 8/14/2020.  She was discharged on 8/27/2020.    Patient reports to me she still has lower abdominal wall vacuum suction in place.  She denies fever sweating chills.  Performance status is ECOG 1.  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  She does have visiting nurse come to the home for wound care.    CT scan for chest abdomen pelvis on 9/9/2020 reported a new 8 mm noncalcified pulmonary nodule in the left lower lobe.  Otherwise stable right upper lobe 6 mm pulmonary nodule, and no evidence of disease recurrence in the abdomen or pelvis.     Laboratory study on 9/16/2020 reported elevated AST 55, ALT 95, alk phosphatase 143 but normal total bilirubin 0.3.  Chemistry lab otherwise unremarkable.  She has completed normal CBC.      Because of the abnormal CT scan examination for chest abdomen pelvis on 9/9/2020 reported a new 8 mm noncalcified pulmonary nodule in the left lower lobe, we obtained a PET scan examination for further evaluation, which was done on 9/18/2020.  This study reported several pulmonary nodules, some of them are hypermetabolic, newly developed compared to previous PET scan in May 2020.  Certainly does highly suspicious for metastatic disease.  There are also hypermetabolic activity in the abdomen and pelvic area which is hard to differentiate from surgical scar versus metastatic malignancy.    Laboratory study on 10/13/2020 reported normal CBC with Hb 13.9, platelets 302,000 and WBC 5520 including ANC 3830.  Chemistry lab reported normalized AST 20, alk phosphatase 116 improved ALT 35, and maintains normal bilirubin, with normal electrolytes  and liver function panel.     Patient was started on first cycle of palliative chemotherapy FOLFIRI on 10/13/2020.    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  Patient had a new port placed 12/18/2020, and had held her Xarelto for 2 days prior to surgery.  She presented to the ER on 12/21/20 with complaints of facial and arm swelling for 3 days.  She also noted increased shortness of breath.  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  Thrombus within the right internal jugular vein cannot be excluded.  Patient was started on IV heparin, and eventually transitioned to weight-based Lovenox, which she now continues.    PET scan examination on 9/24/2021 reported further shrinking of the tiny right upper lobe pulmonary nodule.  Otherwise no new lesions.  No evidence for metastatic disease in other areas.      Patient had CT scan for chest with IV contrast obtained on 12/14/2021 which reported small tiny stable pulmonary nodules. There is a 4 mm right upper lobe pulmonary nodule. There is also a stable 4 mm left lower lobe pulmonary nodule. There is also stable left upper lobe micronodule.     Laboratory studies today on 12/21/2021 reported normal hemoglobin 15.9 however .0, platelets 218,000 WBC 5360 including neutrophils 3730 lymphocytes 980. Chemistry lab reported normal renal function, electrolytes, glucose, and marginally elevated ALT 55, AST 34 but normal total bilirubin and alk phosphatase.     CT scan for chest abdomen pelvis 6/21/2022 reported sub-6 mm pulmonary nodules either stable or slightly smaller.  No new pulmonary nodules or evidence for disease progression.  There is no evidence for metastatic disease in the liver however it shows diffuse hepatic steatosis.  There was masslike thickening in the presacral  space with the clips or calcification approximately 1.7 cm in greatest AP dimension but stable.    Laboratory study on 9/20/2022 reported normal hemoglobin 15.7 with mild macrocytosis .1.  She has normal CBC and platelets.  She also has unremarkable CMP.  Normal CEA 1.13 ng/mL.    Patient was seen by Dr. Ye, who performed ileostomy takedown on 11/14/2022.  Patient reports that she is recovering.  She still has a small open wound less than 1 cm in diameter, however close to 2 cm deep.  She has been changing dressing herself.  She denies fever sweating chills.    Patient has no other specific complaints.  She has excellent performance status ECOG 0.  She denies chest pain dyspnea cough hemoptysis.  No abdominal pain.  No melena hematochezia.  Patient has been eating well, stable weight.  She works full-time.    She continues Lovenox injections and denies any significant bleeding issues.   No other new problems or concerns.     CT scan for chest abdomen pelvis obtained on 1/10/2023 reported stable small pulmonary nodules, no evidence for active or new disease.    Laboratory study on 1/10/2023 reported normal CBC and normal CMP.  CEA level is 0.99 ng/mL.    CT scan for chest, abdomen, and pelvis on 7/7/2023 reported stable small pulmonary nodules including a left upper lobe 5 mm nodule. There were no new masses, or pleural effusion. No enlarged supraclavicular, axillary, mediastinal, or hilar lymphadenopathy. Mediastinal vasculature normal. There is occlusion of the superior vena around the catheter with body wall  is present. There was no evidence for disease recurrence in the abdomen or pelvis. Bone is also negative for metastatic disease.    Laboratory studies obtained on 07/07/2023 also reported normal CBC, and normal CMP as well as low level CEA 1.07 ng/mL.    The CT scan for the chest on 10/27/2023 reported enlarging pulmonary nodules bilaterally. The right upper lobe lung nodule increased from  7 x 7 mm to 9 x 8 mm, and the left upper lobe nodule measured 8 x 9 mm from 5 x 6 mm in 04/2023. There is a different left upper lobe nodule 6 x 8 mm from previous 5 x 5 mm. The presacral tissue thickness measures up to 2.3 cm.    A laboratory study on 10/27/2023 reported CEA 1.58 ng/mL, normal CBC, and CMP.  Molecular study of peripheral blood Guardant Reveal is still pending.    The patient presents today on 11/17/2023 for reevaluation to discuss the results of PET scan examination as well as the results of tumor conference. I saw her recently on 11/03/2023 and because of enlarging pulmonary nodules on the CT scan, we requested a PET scan examination. I presented her case yesterday on 11/16/2023 at the [facility].    The patient reports she is at her baseline condition as 2 weeks ago. No specific complaints, no chest pain, dyspnea, cough, etc. She has a regular bowel movement.    Her case was present at the [facility]. on 11/16/2023. The PET scan images and chest CT scan were reviewed in conjunction with her treatment history. The consensus was for patient to receive stereotactic radiation therapy for the right upper lobe lung lesion, and the bigger left upper lobe lesion, and monitor the smaller left upper lobe lesion with further images studies down the line. Also, the conference recommended Guardant Reveal study which we already ordered.     This Guardant Reveal study came back today as negative for ctDNA 0%.      CT of the chest on 2/2/2024 reported shrinking of the right upper lobe lesion from 9 mm to 6 mm, and the left upper lobe lesion also decreased from 9 mm to 6 mm. Otherwise, there are a couple of stable pulmonary nodules, 7 to 8 mm. The right hilar has a small lymph node that has increased from 6 mm to 8 mm and is otherwise stable. There was no suspicious lesion in the abdomen or pelvis.    Colonoscopy examination 9/11/2024 showed patchy mild inflammation  characterized by congestion/edema in the descending colon. Evidence of previous endo coloanal anastomosis in the rectum. Presence of hemorrhoids.      MEDICATIONS    Current Outpatient Medications:     bismuth subsalicylate (PEPTO BISMOL) 262 MG/15ML suspension, Take 15 mL by mouth 4 (Four) Times a Day., Disp: , Rfl:     clonazePAM (KlonoPIN) 1 MG tablet, Take 1 tablet as needed daily for anxiety. May take one additional as needed for severe anxiety., Disp: 45 tablet, Rfl: 3    Cyanocobalamin (VITAMIN B-12 PO), Take 1 tablet by mouth 3 (Three) Times a Week. M-W-F, Disp: , Rfl:     dicyclomine (BENTYL) 20 MG tablet, TAKE 1 TABLET BY MOUTH EVERY 6 (SIX) HOURS AS NEEDED FOR IRRITABLE BOWEL SYMPTOMS, Disp: 360 tablet, Rfl: 2    diphenoxylate-atropine (LOMOTIL) 2.5-0.025 MG per tablet, TAKE 2 TABLETS BY MOUTH 4 TIMES A DAY, Disp: 240 tablet, Rfl: 11    Enoxaparin Sodium (LOVENOX) 100 MG/ML solution prefilled syringe syringe, INJECT 1 ML UNDER THE SKIN INTO THE APPROPRIATE AREA AS DIRECTED EVERY 12 (TWELVE) HOURS., Disp: 60 mL, Rfl: 2    escitalopram (LEXAPRO) 10 MG tablet, Take 1 tablet by mouth Daily., Disp: 90 tablet, Rfl: 1    famotidine (PEPCID) 20 MG tablet, TAKE 1 TABLET BY MOUTH TWICE A DAY, Disp: 180 tablet, Rfl: 2    HYDROcodone-acetaminophen (NORCO) 5-325 MG per tablet, Take 2 tablets by mouth Every 6 (Six) Hours As Needed for Moderate Pain., Disp: 90 tablet, Rfl: 0    hydrocortisone 2.5 % cream, APPLY RECTALLY 3 TIMES DAILY. INCLUDE APPLICATOR., Disp: , Rfl:     Hydrocortisone, Perianal, (ANUSOL-HC) 2.5 % rectal cream, Apply rectally 3 times daily.  Include applicator., Disp: 30 g, Rfl: 1    Loperamide HCl (IMODIUM PO), Take  by mouth As Needed., Disp: , Rfl:     Loratadine 10 MG capsule, Take 1 capsule by mouth Every Evening., Disp: , Rfl:     metoprolol succinate XL (TOPROL-XL) 25 MG 24 hr tablet, TAKE 0.5 TABLETS BY MOUTH EVERY NIGHT, Disp: 45 tablet, Rfl: 2    nystatin (MYCOSTATIN) 234250 UNIT/GM cream,  "Apply 1 Application topically to the appropriate area as directed 2 (Two) Times a Day., Disp: 30 g, Rfl: 2    nystatin-zinc oxide-hydrocortisone-bacitracin, Apply  topically to the appropriate area as directed Daily As Needed (As needed)., Disp: 60 g, Rfl: 2    octreotide (sandoSTATIN) 100 MCG/ML injection, Inject 1 mL under the skin into the appropriate area as directed 3 (Three) Times a Day., Disp: 90 mL, Rfl: 2    ondansetron (ZOFRAN) 8 MG tablet, TAKE 1 TABLET BY MOUTH THREE TIMES A DAY AS NEEDED FOR NAUSEA AND VOMITING, Disp: 60 tablet, Rfl: 1    pantoprazole (Protonix) 40 MG EC tablet, Take 1 tablet by mouth Daily., Disp: 90 tablet, Rfl: 1    rosuvastatin (CRESTOR) 5 MG tablet, Take 1 tablet by mouth Daily., Disp: 90 tablet, Rfl: 0    Syringe/Needle, Disp, 27G X 1/2\" 1 ML misc, Use as directed for octreotide injections, Disp: 100 each, Rfl: 3    ALLERGIES:   No Known Allergies    SOCIAL HISTORY:       Social History     Tobacco Use    Smoking status: Every Day     Current packs/day: 1.00     Average packs/day: 1 pack/day for 25.0 years (25.0 ttl pk-yrs)     Types: Cigarettes     Passive exposure: Current    Smokeless tobacco: Never    Tobacco comments:     1 PPD/caffeine use    Vaping Use    Vaping status: Some Days    Substances: Nicotine    Devices: Disposable    Passive vaping exposure: Yes   Substance Use Topics    Alcohol use: Not Currently     Comment: RARELY    Drug use: Never         FAMILY HISTORY:   Mother has positive factor V Leiden mutation, although she did not have thrombosis, mother also is coronary disease with stenting, she also is occasional bruising.    Maternal grandmother had DVT, she had multiple surgical procedures.    Patient mother's half-brother had metastatic colon cancer at diagnosis in his 50s.    Maternal great aunt had breast cancer.           There were no vitals filed for this visit.        1/29/2025    11:31 AM   Current Status   ECOG score 0     This is a telemedicine today " 2/12/2025, no physical examination.  Patient looks not in acute distress.  Carries conversation well.      RECENT LABS:              Lab Results   Component Value Date    WBC 6.58 01/29/2025    HGB 13.6 01/29/2025    HCT 41.1 01/29/2025    .5 (H) 01/29/2025     01/29/2025     Lab Results   Component Value Date    GLUCOSE 111 (H) 01/29/2025    BUN 6 01/29/2025    CREATININE 0.58 01/29/2025     01/29/2025    K 3.5 01/29/2025     01/29/2025    CALCIUM 8.3 (L) 01/29/2025    PROTEINTOT 5.9 (L) 01/29/2025    ALBUMIN 3.4 (L) 01/29/2025    ALT 11 01/29/2025    AST 11 01/29/2025    ALKPHOS 79 01/29/2025    BILITOT 0.2 01/29/2025    GLOB 2.5 01/29/2025    AGRATIO 1.4 01/29/2025    BCR 10.3 01/29/2025    ANIONGAP 12.5 01/29/2025    EGFR 113.9 01/29/2025       Urinalysis without microscopic (no culture) - Urine, Clean Catch (01/29/2025 11:30)     IMAGING:  CT Chest With Contrast Diagnostic, CT Abdomen Pelvis With Contrast  Narrative: CT CHEST W CONTRAST DIAGNOSTIC-, CT ABDOMEN PELVIS W CONTRAST-     HISTORY: Rectal cancer follow-up.     TECHNIQUE: CT of the chest, abdomen and pelvis was performed following  administration of intravenous contrast. Reformatted and MIP images were  reviewed. Oral contrast partially opacifies the bowel. Radiation dose  reduction techniques were utilized, including automated exposure control  and exposure modulation based on body size.     COMPARISON:  PET/CT 11/12/2024. CT chest 11/2/2024.     FINDINGS CHEST:       There is a right chest port. The SVC is diminutive. There are numerous  collateral vessels in the anterior chest wall, right greater than left.  Findings suggest chronic central venous occlusion.  Heart size is normal. There is no pericardial effusion. There is  incompletely assessed calcific coronary artery atherosclerosis. There is  trivial pleural fluid on the left.  There is degenerative disc disease. There is osseous demineralization.  There are old rib  fractures.  The trachea is clear. There is mild bilateral curvilinear atelectasis  and or scarring. Previously noted groundglass opacity in the left lung  apex has resolved. There is no focal consolidation. A 0.5 cm nodule in  the anterior right upper lobe is slightly smaller compared to remote  prior examinations. A 0.6 cm nodule in the superior segment of the left  lower lobe is similar to prior but new from remote prior examinations. A  0.6 cm nodule in the left upper lobe is similar to prior but also new  from remote prior examinations. A small nodule in the left upper lobe is  also not significantly changed (image 40).     FINDINGS ABDOMEN/PELVIS:     The liver is normal in size. No suspicious focal intrahepatic lesion is  identified. The gallbladder is surgically absent. The spleen is normal  in size. The pancreas is within normal limits. The adrenal glands are  within normal limits. The kidneys are within normal limits.  No pathologically enlarged abdominal or pelvic lymph nodes are  identified. There is mild calcific atherosclerosis. There is a  circumaortic left renal vein, a developmental variant.  There is an enteroenteric anastomosis in the right lower quadrant. There  are no dilated small bowel loops. Presacral soft tissue thickening is  not significantly changed. The bladder is mildly distended. The uterus  is present. There is no adnexal mass.  Numerous subcutaneous nodules and soft tissue gas overlying the  bilateral anterior lower quadrants are again demonstrated, likely  sequela of injections. Collateral vessels are noted. There is osseous  demineralization. There is degenerative disc disease. There is  transitional lumbosacral anatomy. Sclerotic osseous lesions are similar  to prior.           Impression: COMBINED IMPRESSION:     1.  Scattered bilateral pulmonary nodules measuring up to 0.6 cm, some  of which are similar to prior but new from more remote prior  examinations.  2.  Presacral soft  tissue thickening, similar to prior.  3.  Chronic central venous occlusion.  4.  Additional chronic findings, as above.           This report was finalized on 2/4/2025 12:25 PM by Dr. Irene Dickey M.D  on Workstation: [id number]           ASSESSMENT:   1.  Metastatic rectal cancer.   Initial rectal ultrasound examination reported T3N0 disease without lymphadenopathy.   CT scan of chest, abdomen and pelvis reported no lymphadenopathy in the abdomen, pelvis or chest. She does have small pulmonary nodule 6-7 mm and 2 mm with indeterminate feature. There is no solid evidence of metastatic disease otherwise.   Based on the CT scan and rectal ultrasound imaging studies, this patient had stage IIA (T3N0M0) disease.   She had PET scan examination on 8/8/2019 which reported a hypermetabolic right upper lobe pulmonary nodule 6 mm with SUV 5.6.  This is suspicious for metastatic disease however it is too small to be biopsied.  This patient may have stage IV disease.   She initiated concurrent radiation with continuous 5-FU on 8/12/2019.  Patient finished concurrent chemoradiation therapy.  Patient underwent surgical resection of the rectal tumor and diverting loop ileostomy on 12/2/2019 with Dr. Ye.  Pathology evaluation reported residual T3 disease, with 1 out of 14 lymph nodes positive for malignancy.  Certainly this patient has at least stage IIIb rectal cancer (T3N1M0?)  On 1/22/2020 adjuvant chemotherapy FOLFOX 6 regimen initiated.   On 2/5/2022 cycle #2 was delayed secondary to neutropenia.  She was given 3 days of Granix.   Emend added with cycle 3.  With improved nausea control  Continuing home Granix x3 days following 5-FU disconnect  3/11/2020 due for cycle 4, however, she is experiencing progressive abdominal pain and occasional fevers.   CT scan performed on 3/13/2020 reveals no evidence of progressive or recurrent disease.  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  Patient hospitalized 4/24-4/26/20 after cycle  5 of chemotherapy with acute UTI.  CT abdomen/pelvis noting fluid collection in the presacral region having diminished in size compared to CT dated 3/13/2020.  Patient was evaluated by Dr. Ye while in the hospital with further plans to evaluate possible colovaginal fistula following completion of chemotherapy.  Patient did respond to IV antibiotics and discharged home on oral cefdinir.  4/29/2020 cycle 6 of FOLFOX.  Urinary symptoms have resolved   Patient seen in the [facility] ED on 5/2/2020 for what was suspected to be an allergic reaction.  She called our service on Saturday explaining that she was experiencing hand and face swelling.  She was instructed to proceed to the emergency department at which time she was assessed and prescribed a Medrol Dosepak.  She had just completed her 5-FU and was unhooked on Friday, 5/1/2020.  Her symptoms have resolved in the office on assessment, 5/5/2020.  The patient had grade 3 hand-foot syndrome based on symptomology.  Patient had cycle #8 of 5-FU on 5/13/2020. Due to her symptoms and poor tolerance to the 5-FU, her treatment dose will be reduced 50% for oxaliplatin and infusional 5-FU.  Bolus 5-FU will be discontinued..  On 5/21/2020 patient had a PET scan and it showed no evidence of of metastatic disease in the neck, chest, abdomen or pelvis.  There was fluid accumulation/abscess.   On 5/27/2020 discussed with the patient that we can discontinue chemotherapy at this time.  She will follow-up with Dr. Ye to discuss a possibility to reverse the ileostomy.  We likely will obtain anther PET scan in 3 to 4 months for reassessment.    Patient was seen by Dr. Ye on 6/19/2019 for evaluation and to discuss possible take down of her ileostomy.  She is scheduled to have a gastrografin enema on 7/2/2020 to evaluate for a fistula, and then a colonoscopy on 7/15/2020, both done by Dr. Ye.  She states that based on the above imaging and procedure findings, she may have a  more extensive surgery done or just have her ileostomy reversed, which would likely be done in August 2020.  This will all be coordinated under the care of Dr. Ye.     CT scan from 09/09/2020 and also compared to her previous PET scan examination from 05/21/2020 and also the original PET scan from 08/09/2019.  The original PET scan there is a small right upper lobe pulmonary nodule 6 mm with SUV 5.6. So in the 05/2020 PET scan the nodule is still there but activity seems much less and this most recent CT scan examination also documented the preexisting small pulmonary nodule however there is a new left lower lobe pulmonary nodule 8 mm in size and I shared with the patient today this nodule is suspicious for metastatic disease. Laboratory studies reported minimal CEA level 1.32 on 09/09/2020. Liver function panel was only marginally abnormal with the ALT 45, the remaining is normal. However laboratory study today showed worsening AST 55, ALT 95 and alkaline phosphatase 143 but is still normal, total bilirubin 0.3.   Recommended to have repeat PET scan examination for assessment because the left lower lobe pulmonary nodule is too small to be biopsied, and if the PET scan reports hypermetabolic lesion, I recommended to have stereotactic radiation therapy. Explained to patient that she is not a really good candidate to have thoracotomy for resection because she still has unhealed wound in the abdomen. Stereotactic radiation therapy will likely be a more feasible choice.   PET scan examination obtained on 9/18/2020 showed metastatic disease.  It reported a few hypermetabolic pulmonary nodules, and some new small pulmonary nodules, all of them highly suspicious for metastatic disease.  She also has hypermetabolic activity in the abdomen and pelvic area which could be related to scar tissue from her recent surgery versus metastatic disease.  Nevertheless overall picture fits with metastatic cancer.  Discussed with the  patient on 9/20/2020, we reviewed the PET scan images together, and I recommended to have systematic chemotherapy, I do not think stereotactic reading therapy to the hypermetabolic lesions in the lungs warranted at this time because even those will be treated with reading therapy, she will still need systematic chemotherapy.  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  I did discuss with the patient using anti-EGFR monoclonal antibody versus anti-VEGF monoclonal antibody.     Palliative chemotherapy cycle#1 FOLFIRI was started on 10/13/2020.  No bolus 5-FU given considering previous poor tolerance.    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  Microsatellite stable, mutation burden 5/Mb.   Discussed with the patient 10/27/2020, because of the K-s[place] mutation, she is not a candidate for anti-EGFR monoclonal antibody such as Erbitux or vectibix.  She could be a candidate for anti-VEGF monoclonal antibody, however because of active wound, she is not a candidate right now at this moment.  Patient seen in the ED for acute right neck pain on 11/16/2020.  CT angiogram as well as CT of the cervical spine performed with no acute findings but notably one of her pulmonary nodules, left upper lobe, somewhat decreased in size.  Patient given prednisone pack.  Further details below.  Cycle #6 chemotherapy will be resumed on 1/5/2021.  Started back on original irinotecan.  PET scan examination obtained on 1/12/2021 after cycle #6 chemotherapy reported improved pulmonary nodule hypermetabolic activity.  Confirmed these are metastatic lesions.  Patient is evaluated 1/19/2020, will continue cycle #7 FOLFIRI chemotherapy.  However Avastin will be on hold see next section.   Patient was reevaluated on 2/2/2021, will continue chemotherapy cycle #8 FOLFIRI.  Avastin / biosimilar will be added.    She talked to [facility] specialist in mid  February 2021, and had recommended palliative chemotherapy without surgical intervention of metastatic lung lesions.   On 3/2/2021, patient will proceed with cycle #10 FOLFIRI plus bevacizumab.  After 12 cycles, plan to start maintenance treatment.  3/16/2021 cycle 11.  Plus bevacizumab.  3/30/2021 cycle 12 FOLFIRI plus bevacizumab.  Having issues with worsening nausea despite premeds with dexamethasone, Aloxi, Emend, and Zofran at home.  Patient is requesting a dose of Phenergan, which is helped her in the past.  We will give this to her with treatment today.  PET scan examination on 4/6/2021 reported no evidence of hypermetabolic metastatic lesion.  Discussed with the patient on 4/13/2021, we reviewed the PET scan results.  We recommended to switch to maintenance chemotherapy without irinotecan.  We will continue 5-FU and Avastin every 2 weeks.  We discussed possibility of switching to oral Xeloda treatment.  Discussed with the patient for side effects more so with hand-foot syndrome.  Patient is agreeable.   Xeloda 2000 mg twice daily 7 days on, 7 days off along with Avastin initiated 4/27/2021.  After only 5 doses of Xeloda significant hand-foot syndrome, Xeloda held. Patient requesting to be transitioned back to infusional 5-FU, as she felt that she tolerated infusional 5-FU without any problem.  The patient will receive 5-FU leucovorin and Avastin every 2 weeks.  Xeloda discontinued.  5/25/2021 continued cycle #4 maintenance 5-FU, leucovorin, Avastin tolerating this much better so far, we will continue this. Recommended to have 12 cycles of maintenance chemotherapy, then obtain PET scan for reevaluation.  We could discuss further treatment plan at that time.  9/14/2021 patient due for cycle 12 of additional maintenance 5-FU/leucovorin plus Avastin.  She continues to tolerate treatment well overall.  She is anxious in the office today with her Mediport not getting blood at first and also with upcoming scans  being heavy on her mind.  She was instructed to take one of her Klonopin pills while here to help calm her mind.  Heart rate did improve from 140s down to 112.  Proceed with treatment today as scheduled.  PET scan examination on 9/24/2021 reported further shrinking of the right upper lobe tiny pulmonary nodule.  No other new lesions.  Discussed with patient on 9/24/2021 about further shrinking of the right upper lobe pulmonary nodule and no evidence for other new lesions. We recommended to have chemo break for 3 months with repeated CT scan for reevaluation.  Recent CT scan for chest 12/14/2021 and abdomen CT from 11/29/2021 reported no evidence for active disease. The right upper lobe pulmonary nodule, left lower lobe pulmonary nodule minimal about 4 mm and stable. I discussed with patient today on 12/21/2021, recommended to give her another 3 months chemotherapy holiday and obtain CT scan afterwards for reevaluation. After discussion, patient is agreeable.   CT scan examination for chest abdomen and pelvis obtained on 3/15/2022 reported a slight increase of the left upper lobe pulmonary nodule 5 mm, from previous 3 mm.  The other pulmonary nodules were stable in size.  There is also small left upper lobe groundglass changes.   Dr. Nguyen reviewed images studies with the patient 3/23/2022, compared to multiple previous images including CT chest CT and PET scan, suspected disease progression.  Discussed with the patient, and I recommended to resume maintenance chemotherapy with 5-FU plus leucovorin plus Avastin repeating every 2 weeks, and obtaining CT scan for reassessment in 3 months.  Patient is agreeable.  Patient resumed maintenance chemotherapy on 3/29/2022 with 5-FU leucovorin and Avastin.   4/26/2022, proceed with cycle #27 maintenance 5-FU, leucovorin, and Avastin.  Patient continues to tolerate treatment well.    On 5/10/2022, we will proceed ahead with cycle #28 maintenance chemotherapy.  Plan to have CT  scan after cycle #30.  5/24/2022 cycle 29 maintenance chemotherapy.  Continue to tolerate well.  6/7/2022 cycle #30 maintenance chemotherapy, continuing to tolerate well.   CT scan for chest abdomen pelvis with IV contrast obtained on 6/21/2022 reported sub-6 mm pulmonary nodules either stable or slightly smaller.  We reviewed the images with the patient together.  We discussed with the patient and recommended to hold chemotherapy for now with repeating CT scan in 3 months for reassessment.  CT scan of the chest abdomen pelvis 9/13/2022 reported stable condition, no evidence for recurrent or metastatic colon cancer.  On 1/10/2023 patient had a CT chest abdomen pelvis with reported no evidence for disease progression.  Laboratory study also showed normal CEA.   Patient had a CT scan for chest, abdomen, and pelvis obtained on 04/11/2023. This study reported very small pulmonary nodules, the right upper lobe nodule is stable to 6 mm, however, the left upper lobe and the left lower lobe nodule increased to 5 mm from previous 4 mm. I discussed with the patient today on 04/19/2023, I recommend to obtain another CT scan in 3 months for reassessment. The nodules are so small, they likely will not be picked up by PET scan examination.  CT scan examination of the chest, abdomen, and pelvis obtained on 7/7/2023 reported stable small pulmonary nodules. No evidence for disease progression or new lesions in chest, abdomen, and pelvis.  Discussed with patient today on 7/14/2023, however, patient is concerning for disease progression. I recommended to obtain Guardant Reveal for circulating tumor DNA study. If the study is positive, we will further obtain PET scan examination, otherwise, we will obtain CT scan for chest, abdomen, and pelvis in 3 months for reassessment.  The patient had CT scan for the chest, abdomen, and pelvis obtained on 10/27/2023, which reported worsening pulmonary nodules at bilateral upper lobes. Laboratory  studies showed low normal CEA level and normal liver function panel. The Guardant Reveal study is still pending. I discussed this with the patient on 11/03/2023 and we shared the images, and I recommended having a PET scan examination for further assessment. I will present her case at the multimodality lung conference. If those lesions are deemed to be metastatic lesions, I recommend having stereotactic radiotherapy. I discussed it with the patient, and she voiced understanding and was agreeable with that strategy.  I presented her case at the multimodality chest conference 11/16/2023.  The consensus was for patient to receive stereotactic radiation therapy for the right upper lobe lung lesion, and the bigger left upper lobe lesion, and monitor the smaller left upper lobe lesion with further images studies down the line. Also, the conference recommended Guardant Reveal study which we already ordered. I discussed with the patient today on 11/17/2023, we explained to the patient that the opinion of the conference and she is agreeable with this and prefers this strategy because of limited toxicity compared to long term commitment to starting chemotherapy again. I recommend to obtain a CT scan for the chest, abdomen, and pelvis with both IV and oral contrast for reassessment in 3 months for reassessment of metastatic lung lesions and thickness in the pelvis where the PET scan reported a low activity SUV 2.4 in the surgical bed.   The patient had steroid-induced radiation therapy for the right upper lobe and one of the left upper lobe lesions.  Her CT scan examination on 02/02/2024 reported shrinking nodules of the right upper lobe and one of the left upper lobe lesions, both from 9 mm down to 6 mm. There are 2 stable pulmonary nodules 7 mm. Nothing new.  02/09/2024, I discussed with the patient and recommended CT scan for the chest, abdomen, and pelvis in 3 months for reassessment. Guardant Reveal study was 0% ctDNA. We  will also continue laboratory studies every 3 months for Guardant Reveal, together with routine labs, CBC, CMP, and CEA.  CT scan of the chest, abdomen, and pelvis conducted on 4/23/2024 revealed stable multiple pulmonary nodules, with no other new pulmonary nodules or evidence of disease recurrence or abnormal pelvis. The patient's liver function panel was normal, and low-level CEA level was also noted. The Guardant Reveal study was able to be obtained earlier due to regulatory rules, and we hugo obtain today the Guardant reveal study, be available within 10 to 14 days.   Given the patient's metastatic liver lesion, and lung lesions from colon cancer, careful monitoring is necessary. A chest CT scan with IV contrast will be arranged in 3 months for reevaluation. The study will be reviewed again in 3 months, which will include CBC, CMP, and CEA level.   Imaging study with chest CT scan on 08/02/2024 reported multiple bilateral pulmonary nodules that are relatively stable. However, the Guardant Reveal reported positive ctDNA indicating disease progression. A subsequent PET scan examination on 08/14/2024 reported newly developed hypermetabolic pulmonary nodules. The highest SUV is 3.7, corresponding to an 8 mm new right upper lobe nodule, and there are several other hypometabolic nodules in the lungs.   8/21/2024 resumption of chemotherapy with 5-FU bevacizumab  Triage visit 8/28/2024 with intractable diarrhea that was unclear if this is secondary to chemotherapy or infectious etiology given her known chronic colitis, fever and abdominal pain.  Further management as outlined below  9/4/2024 per review today diarrhea has improved though she is having some difficulty with passage of gas and stool,?  Related to significant inflammation in the rectum versus tumor obstruction.  The passage of gas has improved in the last few days but she does still have to change positions on the toilet to get stool to pass without it being  painful.  Discussed all of this with Dr. Nguyen and we will refer the patient urgently back to Dr. Ye for at the very least rectal examination in the office versus full repeated colonoscopy.  Because of the potential for examination or invasive procedures, we will hold bevacizumab today.  Because the patient had significant diarrhea last week and concerns that it may be related to chemotherapy we will decrease her 5-FU/leucovorin today by 30%.  If she does well we can go back to full dose with cycle 3.     9/18/2024 she presented for evaluation prior to cycle #3 of palliative chemotherapy with 5-FU, leucovorin, and bevacizumab. Given her recent biopsy on 09/11/2024, it is prudent to postpone the Avastin treatment today to ensure safety.  Chemotherapy will be proceeded.      She presented for evaluation on 10/02/2024, tolerating chemotherapy except for diarrhea. This has been managed with octreotide. Proceed with cycle 4 chemotherapy today with 5-FU, leucovorin and resumption of bevacizumab.   Reevaluation 10/16/2024 due for cycle 5 5-FU, leucovorin, bevacizumab.  Due to ongoing significant diarrhea, bevacizumab will be placed on hold for potential surgical intervention.  Additionally, 5-FU will be dose reduced to 50%.  Additional adjustments as outlined below  Patient reviewed 10/30/2024 with improved tolerance to cycle five 5-FU, leucovorin.  We we will attempt to slightly escalate 5-FU dosing, increasing by 10% (40% dose reduction)  Patient did experience leakage of 5-FU, and return 10/31/2024 for evaluation of this.  She was sent for port dye study that showed correct location of her port, so 5-FU was resumed.  The patient's right rib pain is likely due to a new chair on the bone, as indicated by the PET scan. The PET scan revealed a new 2.8 x 1.3 cm groundglass opacity in the lateral aspect of the left apex with SUV 6.3. This appearance is nonspecific and may represent an infectious or inflammatory infiltrate.  Continued monitoring and follow-up imaging are recommended.  The current regimen of 5-FU will be maintained, and Avastin will be reintroduced with full dose. 11/13/2024 will proceed ahead with 5-FU, leucovorin and bevacizumab.   12/4/2024 She also reports two bad days of diarrhea, likely due to Avastin. The dosage of Avastin will be reduced to 4 mg/kg. The 5-FU dosage will be increased to 1680 mg/m².   12/18/2024 proceed with 5-FU and Avastin continuing previous dose reductions as she had excellent tolerance to treatment 2 weeks ago.   12/31/2024 patient reports tolerating chemotherapy with the same dose adjustment.  This is a 1 day early because of the closure of the New Year's day holiday tomorrow.  Next cycle will be resumed in 15 days back to her usual schedule.  Will plan to have CT scan for chest abdomen pelvis with IV contrast in early February 2024.  1/29/2025 proceed with 5-FU and bevacizumab maintaining previous dosing.  CT scan for chest abdomen pelvis on 1/31/2025 reported stable small pulmonary nodules.  No evidence for disease progression.  Due to profound diarrhea from chemotherapy, we decided to postpone next chemotherapy to 3/5/2025.  We are still have a 10% dose reduction.    2.  Intractable diarrhea due to chemotherapy.   Patient developed diarrhea on Saturday, 8/24/2024.  Is unclear if this is related to infection as she did have fevers at the time the diarrhea began or chemotherapy.  She does have chronic colitis noted on most recent PET scan, this therefore could be diverticulitis flare  GI panel and C. difficile negative  8/29/2024 she did receive a single dose of octreotide  Begin empiric Flagyl 500 mg 3 times daily x 10 days  8/30/2024 discussed negative stool studies.  We will add Levaquin to already prescribed Flagyl to complete management of diverticulitis.  It is unclear if the patient's symptoms are related to diverticular flare given her previous abdominal pain and fever versus  chemotherapy.  However, her symptoms are currently improved  9/4/2024 diarrhea has resolved.  Stools are now more soft with some form but she is having difficulty with passage of stool, having to change positions on the toilet to avoid pain.  We are referring back to Dr. Ye as detailed above.  She will finish out the Flagyl and Levaquin as prescribed having roughly 3 days left of both.  We will also adjust doses of 5-FU/leucovorin today with concern for potential chemotherapy-induced diarrhea.  If she does well this cycle we can increase back to full dose with cycle 3 per Dr. Nguyen.  On 9/18/2024 patient reports that she experienced significant diarrhea during cycle #2 chemotherapy on 09/04/2024, leading to a 30% dose reduction. Despite taking Lomotil 2 tablets four times a day, Imodium, and Pepto-Bismol, her diarrhea persisted. She received octreotide shots twice, which improved her symptoms from watery to mushy stools but did not fully resolve the issue. She will continue with the current regimen and consider adding octreotide to her home regimen if diarrhea starts at home. The potential side effects of octreotide, including nausea, were discussed.   10/1/2024 she reports using octreotide self-injection at home, which has significantly improved her diarrhea. Most of the time, she only requires it once a day and occasionally twice a day, depending on the severity of her diarrhea. She is satisfied with the results, which have improved her quality of life and ability to eat properly.   She will also use Lomotil and the Imodium as needed.  Patient is very tearful in the office 10/16/2024 regarding debilitating diarrhea and interference with quality of life.  She questions potential surgical intervention and placement of colostomy due to ongoing pain in her rectum and burning of her skin from diarrhea.  We discussed adjustments today including increased dose of octreotide to 200 mcg 3 times daily for diarrhea.   Chemotherapy dose adjustments.  Diarrhea was slightly improved following most recent cycle of chemotherapy.  Continue supportive care  12/4/2024 patient reports that Avastin may be the agent causing her diarrhea.  So we will decrease dose by 20% and to see how things going.  Improved diarrhea with dose reduction to Avastin.  Continue previous dosing.   12/31/2024 She reports no significant diarrhea and continues to use octreotide three times a day, which appears to be effective.  1/15/2025 she does report exacerbation of diarrhea following most recent cycle which caused irritation of her internal hemorrhoids.  However, her symptoms were short-lived and resolved after a few days and she therefore wishes to maintain previous dosing  1/29/2025 she reports stable diarrhea following most recent cycle of chemotherapy, continuing octreotide  This patient developed severe diarrhea after most recent chemotherapy despite using octreotide as before.  She missed appointment 2/11/2025.  Today on 2/12/2025, we discussed and recommended to postpone next chemotherapy to 3/5/2025.  This will give patient a 5-week break from previous chemotherapy.      3.   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  Low-dose Lovenox discontinued and initiated Xarelto 20 mg daily.  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  Patient held Xarelto for 2 days prior to MediPort removal from the left chest wall and placement of new port in the right chest wall on  12/18/2020.  She resumed Xarelto that evening.  Progressive swelling in the neck, face, upper extremities prompting hospitalization with CT scan showing thrombosis in the right subclavian vein and SVC.  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  Although she had been off of Xarelto for a few days, clearly Xarelto was not prohibiting progression of her thrombosis after she resumed treatment and there was some evidence to suggest thrombosis had been present at least in the innominate vein on prior scan in September but now appears chronic.  Current acute thrombosis involving SVC and right subclavian.  Patient was admitted and placed on heparin drip  Patient was evaluated by vascular surgery and intervention was not recommended for thrombolysis/thrombectomy.  On 12/22/2020, the patient developed worsening shortness of breath, increasing upper extremity edema consistent with worsening SVC syndrome.  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  Symptoms improved and patient was discharged 12/24/2020 on Lovenox 100 mg every 12 hours.    Returns 12/29/2020 for evaluation continuing Lovenox, overall tolerating it well.  No bleeding issues.  Improved edema 1/5/2021.  Will continue Lovenox weight-based every 12 hours.  On 1/19/2021 and 2/2/2021, patient reports improved facial swelling.  She however does have being bruise on her abdomen wall where she does Lovenox injection.  However she does not have a spontaneous epistaxis, gum bleeding or other bleeding.   On 2/2/2021, we discussed that side effects of Avastin/biosimilar related to thrombosis.  Since this patient already has been on Lovenox, I think the benefits from treatment for her cancer will outweigh that risk of thrombosis, will proceed ahead with Avastin.  I cautioned  patient to watch out for signs of worsening thrombosis patient voiced understanding.   5/11/2021 Lovenox dosing will be adjusted due to patient's weight loss.  We discussed she needs 1 mg/kg.  Her syringes are 100 mg syringes she will do 0.9 mL subcu every 12 hours.  8/3/2021 Patient continues to have bruising on abdomen from lovenox injections.  Will need to monitor weight and adjust dosing in future if further weight loss.   Stable condition on 9/28/2021.  Continue Lovenox anticoagulation.    Patient reports no chest pain no dyspnea no leg edema. However she had bruising and also most recently abnormal small abscess for which she actually went to ER on 11/29/2021, had I&D. The wound is healing. No further drainage. Denies fever sweating or chills. After discussion, she will continue Lovenox injection for now.   On 3/23/2022, patient reports good tolerance of Lovenox.  Nevertheless she still has small quantity of pus in the left lower abdomen abscess, she squeezes it every day to prevent it became worse.  She reports no fever sweating chills.  Recommended to start Augmentin 875 mg twice a day for 7 days.  She will continue Lovenox indefinitely.  On 4/12/2022, patient reports resolution of the small abscess at left lower abdominal wall.   On 5/10/2022, patient continues on Lovenox indefinitely.  No easy bleeding or bruising.  6/23/2022 continuing on Lovenox 1 mg/kg at a dose of 100 mg (patient weight is 96.6 kg today) every 12 hours.  On 1/17/2023, patient reports good tolerance, Lovenox will be continued.    Patient had a venous Doppler study on 02/05/2023, which reported acute left upper extremity DVT in the subclavian vein, axillary and the brachial vein, and also superficial thrombophlebitis in the basilic upper arm.   On 04/19/2023, patient reports that she was not compliant with anticoagulation at the time of recurrent DVT. She now is fully compliant and is using Lovenox.  Continues on anticoagulation without  signs or symptoms of bleeding.  She does have occasional irritation and bleeding with wiping related to frequent diarrhea  Due to right sided pleuritic pain the patient was seen in the emergency department 10/22/2024 with a negative CT angiogram.  Without thrombosis.  Anticoagulation continued.  Patient had CTA again on 11/2/2024 due to continued pleuritic pain that was negative for PE.  1/29/2025 tolerating anticoagulation with Lovenox.  This will be continued.  Patient did have a fall and has bruising on her left low back and flank.  Thankfully, no other bleeding  CT scan 1/31/2025 reported chronic central venous thrombosis.  Patient will continue Lovenox anticoagulation.      4.  Mild anemia.   She also has history of iron deficiency.  Iron studies reveal iron saturation of 10%.  She was started on oral iron but was unable to tolerate due to GI side effects.   Status post Injectafer 2/5/2020 and 2/12/2020   Improved hemoglobin 13.5 on 1/5/2021.  3/16/2020 when hemoglobin now up to 16.2, hematocrit 47.6.  Patient admits that she has started smoking again, and I have encouraged her to quit.  She denies any snoring or sleep apnea diagnosis.  Patient is not taking oral iron.  We will closely monitor.  3/30/2020 hemoglobin 15.7, HCT 47.5.  Patient states that she has cut back significantly on her smoking, although not quit yet.  I have encouraged her to continue working on this.  We will continue to closely monitor.  Laboratory studies 6/22/2021 reported excellent folate > 20 ng/mL, however low normal B12 level 357 pg/mL.    On 7/6/2021, normal hemoglobin 15.8, .9 and HCT 47.2%.  Patient was asked to start oral vitamin B12 at 1000 mcg daily.   Lab study on 12/14/2021 reported excellent ferritin 296 and iron saturation 25% with free iron 104 TIBC 424. Hemoglobin was 15.2 with .2.   Normal hemoglobin 14.6 on 3/15/2022.  Iron study reported normal ferritin 129.5 ng/mL however slightly low iron saturation  11%.  Continue to monitor for now.  On 1/29/2025 hemoglobin is normal at 13.6    5.  Peripheral neuropathy secondary to chemotherapy.   This is mild involving bilateral hands and feet as reported on 9/16/2020.   Will avoid chemotherapy with oxaliplatin.  Stable mild neuropathy as of 11/10/2020  Patient reports worsening peripheral neuropathy and cycle #5 chemotherapy on 12/8/2020 with and without irinotecan.   On 1/5/2021, patient reports improvement of peripheral neuropathy, will add irinotecan back to chemotherapy.  Continue to monitor and adjust medication.  Stable.      6.  Depression.  Patient seen by JULIET Davenport on 11/9/2020.  She was started on mirtazapine.  Lexapro was discontinued.  This has definitely improved her mood with the patient feeling overall much better.  She is sleeping better.  Appetite is improved with her actually eating more than she wants to.    Condition is stable.  She plans to continue follow-up with supportive oncology.      7. Malfunction of the portal catheter  Patient reports there was no blood drawn from the portal catheter. CT scan of the chest on 7/7/2023 reported occlusion of the SVC around to the Port-A-Cath tubing. I recommend trying activase to see if it helps.  Otherwise, we may have to remove the catheter. Clinically, patient has no signs of SVC syndrome.  On 11/03/2023, the patient reports that her Port-A-Cath was able to be used for a CT scan for the injection of intravenous dye; however, there was no blood return, so we needed to draw from her arm for the blood test. We will continue to keep Port-A-Cath for now.  On 02/09/2024, the patient reports that the port was able to be flushed. However, no blood was returned. We will continue to flush every 6 weeks.  9/4/2024 she continues to have no blood return from her port but can taste saline as we flush at each time and is functioning well otherwise.  Monitor.  10/16/2024 no specific problem.  Port dye study  10/31/2024 showing correct position of port.  Fibrin sheath present.  No issue today.    8. Internal hemorrhoids.  She has large internal hemorrhoids diagnosed during a flexible sigmoidoscopy on 09/11/2024. Hydrocortisone cream was prescribed for treatment.   Not active problem today.    9.  This patient also has strong family history for malignancy   The patient had appointment with genetic counseling on September 3, 2019.  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    10. Hypertension.  Continue management of hypertension and follow up with PCP.      11.  Chest and back pain  Ports this has been ongoing for about 4 weeks.  Started in her right chest/rib cage.  Originally this pain had completely resolved, however returned when she received her next chemotherapy infusion. She felt like the pain worsened after she was unhooked from her 5-FU and that the pain spread to her right scapular/back area.  Since unhook she has continued with pain in her right chest/back that has required her to take pain medicine regularly which is not typical for her.  She has pain with deep inspiration and pain with certain movements.  She also has pain when she pushes on the tissue around her port, though there is no redness or warmth around her port site aside from red rash in the shape of a bandaid and she reports being sensitive to bandaids.  Reviewed all of patients symptoms with Dr. Nguyen who recommended obtaining doppler right upper extremity and PET scan for further evaluation of this severe right sided pain that has been evaluated by [facility] without any signs of what could be causing her pain.  Of note she was seen in the ED 11/2/2024, had CT chest performed which was negative for PE.  EKG and troponin looked okay.  She was discharged home.    Healing fracture of the right 2nd rib. The PET scan on 11/12/2024 showed new hypermetabolic activity at a healing fracture on the anterior aspect of the right second rib with SUV 5.7. The  patient should avoid activities that may exacerbate the pain and consider pain management options as needed.    12/4/2024 the patient reports improvement in rib pain, with the ability to lay on the affected side, though still experiencing some soreness and pain upon sneezing or deep breathing.    Not active problem today.        PLAN:    Keep appointment for tomorrow with IV hydration 1 L normal saline.  Will postpone chemotherapy to 3/5/2025, she will come in for visit with me, laboratory studies and resume palliative chemotherapy with 5-FU, leucovorin and bevacizumab, however will be 10% dose reduced for her infusional 5-FU at 1440 mg/m².  Avastin will be maintained at 4 mg/kg.  Leucovorin will be same dose 280 mg/m².  Continue octreotide, currently utilizing 100 mcg every 8 hours following chemotherapy.   Continue Imodium and Lomotil as needed.  Continue anticoagulation with Lovenox subcu injection every 12 hours.   Continue Magic barrier cream as needed.   Continue Norco 5/325 as needed for pain.  1 to 2 tablets.  The patient was encouraged to utilize this for her musculoskeletal pain following her fall  Continue Protonix 40 mg daily.   Continue Gas-X.       This is a video visit to discuss her recent laboratory study results and CT scan of the chest, abdomen and pelvis.      The patient is on a high risk medication requiring close monitoring for toxicity.       Dong Nguyen MD PhD  02/12/2025        CC:  WAYNE Worley MD Justin Anderson, MD

## 2025-02-13 ENCOUNTER — INFUSION (OUTPATIENT)
Dept: ONCOLOGY | Facility: HOSPITAL | Age: 45
End: 2025-02-13
Payer: COMMERCIAL

## 2025-02-13 VITALS
TEMPERATURE: 98 F | BODY MASS INDEX: 29.49 KG/M2 | WEIGHT: 177.2 LBS | SYSTOLIC BLOOD PRESSURE: 132 MMHG | OXYGEN SATURATION: 98 % | DIASTOLIC BLOOD PRESSURE: 84 MMHG | HEART RATE: 97 BPM | RESPIRATION RATE: 16 BRPM

## 2025-02-13 DIAGNOSIS — E86.0 DEHYDRATION: Primary | ICD-10-CM

## 2025-02-13 DIAGNOSIS — Z45.2 ENCOUNTER FOR FITTING AND ADJUSTMENT OF VASCULAR CATHETER: ICD-10-CM

## 2025-02-13 DIAGNOSIS — K52.9 CHRONIC DIARRHEA: ICD-10-CM

## 2025-02-13 PROCEDURE — 96360 HYDRATION IV INFUSION INIT: CPT

## 2025-02-13 PROCEDURE — 25010000002 HEPARIN LOCK FLUSH PER 10 UNITS: Performed by: INTERNAL MEDICINE

## 2025-02-13 PROCEDURE — 25810000003 SODIUM CHLORIDE 0.9 % SOLUTION: Performed by: INTERNAL MEDICINE

## 2025-02-13 RX ORDER — SODIUM CHLORIDE 0.9 % (FLUSH) 0.9 %
10 SYRINGE (ML) INJECTION AS NEEDED
Status: DISCONTINUED | OUTPATIENT
Start: 2025-02-13 | End: 2025-02-13 | Stop reason: HOSPADM

## 2025-02-13 RX ORDER — HEPARIN SODIUM (PORCINE) LOCK FLUSH IV SOLN 100 UNIT/ML 100 UNIT/ML
500 SOLUTION INTRAVENOUS AS NEEDED
OUTPATIENT
Start: 2025-02-13

## 2025-02-13 RX ORDER — HEPARIN SODIUM (PORCINE) LOCK FLUSH IV SOLN 100 UNIT/ML 100 UNIT/ML
500 SOLUTION INTRAVENOUS AS NEEDED
Status: DISCONTINUED | OUTPATIENT
Start: 2025-02-13 | End: 2025-02-13 | Stop reason: HOSPADM

## 2025-02-13 RX ORDER — SODIUM CHLORIDE 0.9 % (FLUSH) 0.9 %
10 SYRINGE (ML) INJECTION AS NEEDED
OUTPATIENT
Start: 2025-02-13

## 2025-02-13 RX ADMIN — Medication 500 UNITS: at 15:25

## 2025-02-13 RX ADMIN — SODIUM CHLORIDE 1000 ML: 9 INJECTION, SOLUTION INTRAVENOUS at 14:00

## 2025-02-13 RX ADMIN — Medication 10 ML: at 15:25

## 2025-03-03 DIAGNOSIS — G89.3 CANCER ASSOCIATED PAIN: ICD-10-CM

## 2025-03-03 RX ORDER — HYDROCODONE BITARTRATE AND ACETAMINOPHEN 5; 325 MG/1; MG/1
2 TABLET ORAL EVERY 6 HOURS PRN
Qty: 90 TABLET | Refills: 0 | Status: CANCELLED | OUTPATIENT
Start: 2025-03-03

## 2025-03-04 DIAGNOSIS — G89.3 CANCER ASSOCIATED PAIN: ICD-10-CM

## 2025-03-04 RX ORDER — HYDROCODONE BITARTRATE AND ACETAMINOPHEN 5; 325 MG/1; MG/1
2 TABLET ORAL EVERY 6 HOURS PRN
Qty: 90 TABLET | Refills: 0 | Status: CANCELLED | OUTPATIENT
Start: 2025-03-04

## 2025-03-04 RX ORDER — HYDROCODONE BITARTRATE AND ACETAMINOPHEN 5; 325 MG/1; MG/1
2 TABLET ORAL EVERY 6 HOURS PRN
Qty: 90 TABLET | Refills: 0 | Status: SHIPPED | OUTPATIENT
Start: 2025-03-04

## 2025-03-05 ENCOUNTER — INFUSION (OUTPATIENT)
Dept: ONCOLOGY | Facility: HOSPITAL | Age: 45
End: 2025-03-05
Payer: COMMERCIAL

## 2025-03-05 ENCOUNTER — OFFICE VISIT (OUTPATIENT)
Dept: ONCOLOGY | Facility: CLINIC | Age: 45
End: 2025-03-05
Payer: COMMERCIAL

## 2025-03-05 ENCOUNTER — CLINICAL SUPPORT (OUTPATIENT)
Dept: OTHER | Facility: HOSPITAL | Age: 45
End: 2025-03-05
Payer: COMMERCIAL

## 2025-03-05 VITALS
BODY MASS INDEX: 30.16 KG/M2 | HEART RATE: 72 BPM | SYSTOLIC BLOOD PRESSURE: 85 MMHG | HEIGHT: 65 IN | TEMPERATURE: 97.4 F | WEIGHT: 181 LBS | OXYGEN SATURATION: 95 % | DIASTOLIC BLOOD PRESSURE: 75 MMHG | RESPIRATION RATE: 14 BRPM

## 2025-03-05 DIAGNOSIS — D68.51 HETEROZYGOUS FACTOR V LEIDEN MUTATION: Chronic | ICD-10-CM

## 2025-03-05 DIAGNOSIS — G62.0 DRUG-INDUCED PERIPHERAL NEUROPATHY: ICD-10-CM

## 2025-03-05 DIAGNOSIS — C20 ADENOCARCINOMA OF RECTUM: ICD-10-CM

## 2025-03-05 DIAGNOSIS — T45.1X5D ADVERSE EFFECT OF ANTINEOPLASTIC AND IMMUNOSUPPRESSIVE DRUGS, SUBSEQUENT ENCOUNTER: ICD-10-CM

## 2025-03-05 DIAGNOSIS — K52.1 CHEMOTHERAPY INDUCED DIARRHEA: ICD-10-CM

## 2025-03-05 DIAGNOSIS — C78.00 MALIGNANT NEOPLASM METASTATIC TO LUNG, UNSPECIFIED LATERALITY: ICD-10-CM

## 2025-03-05 DIAGNOSIS — T45.1X5A CHEMOTHERAPY INDUCED DIARRHEA: ICD-10-CM

## 2025-03-05 DIAGNOSIS — Z79.899 ENCOUNTER FOR LONG-TERM (CURRENT) USE OF HIGH-RISK MEDICATION: ICD-10-CM

## 2025-03-05 DIAGNOSIS — Z79.899 ENCOUNTER FOR LONG-TERM (CURRENT) USE OF HIGH-RISK MEDICATION: Primary | ICD-10-CM

## 2025-03-05 DIAGNOSIS — Z86.711 HISTORY OF PULMONARY EMBOLISM: ICD-10-CM

## 2025-03-05 DIAGNOSIS — Z79.01 ANTICOAGULATION ADEQUATE: ICD-10-CM

## 2025-03-05 LAB
ALBUMIN SERPL-MCNC: 3.3 G/DL (ref 3.5–5.2)
ALBUMIN/GLOB SERPL: 1.2 G/DL
ALP SERPL-CCNC: 90 U/L (ref 39–117)
ALT SERPL W P-5'-P-CCNC: 29 U/L (ref 1–33)
ANION GAP SERPL CALCULATED.3IONS-SCNC: 7.9 MMOL/L (ref 5–15)
AST SERPL-CCNC: 30 U/L (ref 1–32)
BASOPHILS # BLD AUTO: 0.02 10*3/MM3 (ref 0–0.2)
BASOPHILS NFR BLD AUTO: 0.3 % (ref 0–1.5)
BILIRUB SERPL-MCNC: 0.3 MG/DL (ref 0–1.2)
BILIRUB UR QL STRIP: ABNORMAL
BUN SERPL-MCNC: 5 MG/DL (ref 6–20)
BUN/CREAT SERPL: 9.3 (ref 7–25)
CALCIUM SPEC-SCNC: 8.5 MG/DL (ref 8.6–10.5)
CHLORIDE SERPL-SCNC: 107 MMOL/L (ref 98–107)
CLARITY UR: CLEAR
CO2 SERPL-SCNC: 24.1 MMOL/L (ref 22–29)
COLOR UR: YELLOW
CREAT SERPL-MCNC: 0.54 MG/DL (ref 0.57–1)
DEPRECATED RDW RBC AUTO: 48.6 FL (ref 37–54)
EGFRCR SERPLBLD CKD-EPI 2021: 115.9 ML/MIN/1.73
EOSINOPHIL # BLD AUTO: 0.13 10*3/MM3 (ref 0–0.4)
EOSINOPHIL NFR BLD AUTO: 2 % (ref 0.3–6.2)
ERYTHROCYTE [DISTWIDTH] IN BLOOD BY AUTOMATED COUNT: 13.3 % (ref 12.3–15.4)
GLOBULIN UR ELPH-MCNC: 2.8 GM/DL
GLUCOSE SERPL-MCNC: 104 MG/DL (ref 65–99)
GLUCOSE UR STRIP-MCNC: NEGATIVE MG/DL
HCT VFR BLD AUTO: 43.3 % (ref 34–46.6)
HGB BLD-MCNC: 14.1 G/DL (ref 12–15.9)
HGB UR QL STRIP.AUTO: ABNORMAL
IMM GRANULOCYTES # BLD AUTO: 0.01 10*3/MM3 (ref 0–0.05)
IMM GRANULOCYTES NFR BLD AUTO: 0.2 % (ref 0–0.5)
KETONES UR QL STRIP: NEGATIVE
LEUKOCYTE ESTERASE UR QL STRIP.AUTO: NEGATIVE
LYMPHOCYTES # BLD AUTO: 2.05 10*3/MM3 (ref 0.7–3.1)
LYMPHOCYTES NFR BLD AUTO: 31.5 % (ref 19.6–45.3)
MCH RBC QN AUTO: 32.3 PG (ref 26.6–33)
MCHC RBC AUTO-ENTMCNC: 32.6 G/DL (ref 31.5–35.7)
MCV RBC AUTO: 99.3 FL (ref 79–97)
MONOCYTES # BLD AUTO: 0.4 10*3/MM3 (ref 0.1–0.9)
MONOCYTES NFR BLD AUTO: 6.1 % (ref 5–12)
NEUTROPHILS NFR BLD AUTO: 3.9 10*3/MM3 (ref 1.7–7)
NEUTROPHILS NFR BLD AUTO: 59.9 % (ref 42.7–76)
NITRITE UR QL STRIP: NEGATIVE
NRBC BLD AUTO-RTO: 0 /100 WBC (ref 0–0.2)
PH UR STRIP.AUTO: 6 [PH] (ref 4.5–8)
PLATELET # BLD AUTO: 203 10*3/MM3 (ref 140–450)
PMV BLD AUTO: 9.4 FL (ref 6–12)
POTASSIUM SERPL-SCNC: 3.8 MMOL/L (ref 3.5–5.2)
PROT SERPL-MCNC: 6.1 G/DL (ref 6–8.5)
PROT UR QL STRIP: ABNORMAL
RBC # BLD AUTO: 4.36 10*6/MM3 (ref 3.77–5.28)
SODIUM SERPL-SCNC: 139 MMOL/L (ref 136–145)
SP GR UR STRIP: 1.02 (ref 1–1.03)
UROBILINOGEN UR QL STRIP: ABNORMAL
WBC NRBC COR # BLD AUTO: 6.51 10*3/MM3 (ref 3.4–10.8)

## 2025-03-05 PROCEDURE — 96367 TX/PROPH/DG ADDL SEQ IV INF: CPT

## 2025-03-05 PROCEDURE — 25010000002 FOSAPREPITANT PER 1 MG: Performed by: INTERNAL MEDICINE

## 2025-03-05 PROCEDURE — 80053 COMPREHEN METABOLIC PANEL: CPT

## 2025-03-05 PROCEDURE — 25010000002 LEUCOVORIN CALCIUM PER 50 MG: Performed by: INTERNAL MEDICINE

## 2025-03-05 PROCEDURE — 96375 TX/PRO/DX INJ NEW DRUG ADDON: CPT

## 2025-03-05 PROCEDURE — G0498 CHEMO EXTEND IV INFUS W/PUMP: HCPCS

## 2025-03-05 PROCEDURE — 85025 COMPLETE CBC W/AUTO DIFF WBC: CPT

## 2025-03-05 PROCEDURE — 25010000002 PALONOSETRON PER 25 MCG: Performed by: INTERNAL MEDICINE

## 2025-03-05 PROCEDURE — 81003 URINALYSIS AUTO W/O SCOPE: CPT

## 2025-03-05 PROCEDURE — 25010000002 BEVACIZUMAB 100 MG/4ML SOLUTION 4 ML VIAL: Performed by: INTERNAL MEDICINE

## 2025-03-05 PROCEDURE — 25010000002 FLUOROURACIL PER 500 MG: Performed by: INTERNAL MEDICINE

## 2025-03-05 PROCEDURE — 25810000003 SODIUM CHLORIDE 0.9 % SOLUTION 250 ML FLEX CONT: Performed by: INTERNAL MEDICINE

## 2025-03-05 PROCEDURE — 25810000003 SODIUM CHLORIDE 0.9 % SOLUTION: Performed by: INTERNAL MEDICINE

## 2025-03-05 PROCEDURE — 96413 CHEMO IV INFUSION 1 HR: CPT

## 2025-03-05 PROCEDURE — 25010000002 DEXAMETHASONE SODIUM PHOSPHATE 100 MG/10ML SOLUTION: Performed by: INTERNAL MEDICINE

## 2025-03-05 RX ORDER — HYDROCORTISONE 25 MG/G
CREAM TOPICAL
Qty: 30 G | Refills: 1 | Status: CANCELLED | OUTPATIENT
Start: 2025-03-05

## 2025-03-05 RX ORDER — SODIUM CHLORIDE 9 MG/ML
20 INJECTION, SOLUTION INTRAVENOUS ONCE
Status: CANCELLED | OUTPATIENT
Start: 2025-03-05

## 2025-03-05 RX ORDER — SODIUM CHLORIDE 9 MG/ML
20 INJECTION, SOLUTION INTRAVENOUS ONCE
Status: COMPLETED | OUTPATIENT
Start: 2025-03-05 | End: 2025-03-05

## 2025-03-05 RX ORDER — SODIUM CHLORIDE 9 MG/ML
1000 INJECTION, SOLUTION INTRAVENOUS ONCE
Status: CANCELLED | OUTPATIENT
Start: 2025-03-07

## 2025-03-05 RX ORDER — PALONOSETRON 0.05 MG/ML
0.25 INJECTION, SOLUTION INTRAVENOUS ONCE
Status: CANCELLED | OUTPATIENT
Start: 2025-03-05

## 2025-03-05 RX ORDER — PALONOSETRON 0.05 MG/ML
0.25 INJECTION, SOLUTION INTRAVENOUS ONCE
Status: COMPLETED | OUTPATIENT
Start: 2025-03-05 | End: 2025-03-05

## 2025-03-05 RX ADMIN — DEXAMETHASONE SODIUM PHOSPHATE 12 MG: 10 INJECTION, SOLUTION INTRAMUSCULAR; INTRAVENOUS at 11:05

## 2025-03-05 RX ADMIN — FLUOROURACIL 2880 MG: 50 INJECTION, SOLUTION INTRAVENOUS at 11:53

## 2025-03-05 RX ADMIN — SODIUM CHLORIDE 20 ML/HR: 9 INJECTION, SOLUTION INTRAVENOUS at 09:58

## 2025-03-05 RX ADMIN — PALONOSETRON HYDROCHLORIDE 0.25 MG: 0.25 INJECTION INTRAVENOUS at 11:03

## 2025-03-05 RX ADMIN — BEVACIZUMAB 270 MG: 100 INJECTION, SOLUTION INTRAVENOUS at 10:34

## 2025-03-05 RX ADMIN — LEUCOVORIN CALCIUM 480 MG: 350 INJECTION, POWDER, LYOPHILIZED, FOR SUSPENSION INTRAMUSCULAR; INTRAVENOUS at 11:22

## 2025-03-05 RX ADMIN — FOSAPREPITANT 100 ML: 150 INJECTION, POWDER, LYOPHILIZED, FOR SOLUTION INTRAVENOUS at 09:57

## 2025-03-05 NOTE — PROGRESS NOTES
REASON FOR FOLLOW UP:     *. Rectal cancer, rectal ultrasound examination in July 2019 reported T3N0 disease without lymphadenopathy. She does have small pulmonary nodule 6-7 mm and 2 mm with indeterminate feature. There is no solid evidence of metastatic disease otherwise. Patient has stage IIA (T3N0M0) disease.  Post neoadjuvant chemoradiation therapy, patient underwent surgical resection of the primary rectal cancer by Dr. Ye on 12/2/2019.  Pathology evaluation reported residual T3N1 disease stage IIIb.   PET scan examination on 9/18/2020 reported multiple hypermetabolic small pulmonary nodules/ new pulmonary nodules.   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 It reported tumor mutation burden 5 Muts/Mb, microsatellite stable, TP53 mutation R282W, and others.   Patient had a telemedicine evaluation at that the [facility] in February 2021.  They agreed with our treatment plan for palliative chemotherapy followed by maintenance chemotherapy.     *.The patient is heterozygous factor V Leiden, was on prophylactic anticoagulation with Lovenox 40 mg daily given her increased risk of thrombosis with MediPort and GI malignancy.   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  Patient was switched to weight-based Lovenox injection.        HISTORY OF PRESENT ILLNESS:  The patient is a 44 y.o. female with the above-mentioned history, who is seen today for evaluation.     History of Present Illness  The patient presents today on 03/05/2025 for a 4-week follow-up evaluation prior to resumption of her chemotherapy. Last chemotherapy was on 01/29/2025 and because of profound diarrhea, her chemotherapy was held in the past month.    She reports an improvement in her diarrhea, with no significant episodes for approximately 1.5 weeks. However, she experienced a  minor episode last night, which has since resolved. She has been maintaining adequate hydration and has not experienced any weight loss, instead noting a weight gain of 3 pounds. Her appetite remains good.    She expresses concern about her laboratory results, recalling that 2 or 3 parameters were previously abnormal. She acknowledges a decrease in her food intake.    She reports fatigue, which she attributes to disrupted sleep due to rib fractures. She has sustained 5 rib fractures to date.      Results  Laboratory Studies  CBC: HB 14.1, WBC 6510, platelets 203,000.  Liver function panel: Normal. Albumin 3.3. Glucose 104. Calcium 8.5. Creatinine 0.54.  Electrolytes (sodium, potassium, chloride): Normal.          Past Medical History:   Diagnosis Date    Abdominopelvic abscess 08/12/2020    ADMITTED TO [facility]    Abnormal Pap smear of cervix 02/02/1998    JULIUS I    Abscess of abdominal wall 11/28/2012    SEEN AT [facility] ER    Anemia in pregnancy     Anxiety     Bilateral epiphora 04/2020    Bilateral hand swelling 05/02/2020    SEEN AT [facility] ER    Cervical lymphadenitis 06/06/2012    SEEN AT [facility] ER    Chemotherapy induced diarrhea 01/2021    Chemotherapy induced neutropenia 04/2020    Chemotherapy-induced nausea 02/2020    Chemotherapy-induced thrombocytopenia 05/2020    Chronic anticoagulation     Chronic diarrhea     Colon polyps     FOLLOWED BY DR. 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 ALEXANDRU HUNT    History of gestational diabetes     History of pre-eclampsia     History of pre-eclampsia     History of radiation therapy     FOLLOWED BY DR. JAVON LEWIS    History of TB skin testing 2009    TB Skin Test    History of TB skin testing 2009    TB Skin Test    History of transfusion 2019    2019    HPV in female     Hypokalemia 2019    Hypomagnesemia 2019    Hyponatremia 2021    IBS (irritable bowel syndrome)     Ileostomy present 2020    Take down on 11/3/2022    Infected insect bite of neck 2016    SEEN AT [facility]    Kidney stones 2007    SEEN AT [facility] ER    Low anterior resection syndrome 2022    FOLLOWED BY DR. GERONIMO ESPARZA    Lump of right breast     SWOLLEN LYMPH NODE    Lung cancer 2020    METASTATIC LUNG CANCER    Macrocytosis 2021    Monopolar depression     On anticoagulant therapy     Pain associated with surgical procedure 2020    Palmar-plantar erythrodysesthesia 2021    Perirectal abscess 2020    Pulmonary embolism without acute cor pulmonale 09/20/2019    X 3; ADMITTED TO [facility]    Pulmonary nodule, right 2020    Rectal cancer 2019    STAGE IIA, INVASIVE MODERATELY DIFFERENTIATED ADENOCARCINOMA, CHEMO AND XRT FINISHED 2019    Right shoulder pain 2020    SEEN AT [facility] ER    Right ureteral stone 10/01/2019    SEEN AT [facility] ER    SAB (spontaneous ) 1996     A2-1 INDUCED    Sciatica     Sepsis due to cellulitis 2002    LEFT GREAT TOE, ADMITTED TO [facility]    Tachycardia 2020    Thrombosis of superior vena cava 2020    AND BRACHIOCEPHALIC VEIN, ADMITTED TO [facility]    Urinary urgency 2020   Patient had COVID-19 infection diagnosed on 2022 despite was fully vaccinated and boosted.  She recovered without problem.      Past Surgical History:   Procedure Laterality Date    ABDOMINAL SURGERY      CHOLECYSTECTOMY N/A 10/10/2003     LAPAROSCOPIC WITH CHOLANGIOGRAM, DR. JAMEY TALAVERA AT [facility]    COLON RESECTION N/A 12/02/2019    Procedure: laparoscopic low anterior colon resection with mobilization of splenic flexure and diverting loop ileostomy: ERAS;  Surgeon: Padmaja Esparza MD;  Location: [facility];  Service: General    COLON RESECTION N/A 08/03/2020    Procedure: LOW ANTERIOR COLON RESECTION, COLOANAL ANASTOMOSIS, MOBILIZATION SPLENIC FLEXURE;  Surgeon: Padmaja Esparza MD;  Location: [facility];  Service: General;  Laterality: N/A;    COLONOSCOPY N/A 07/15/2020    PATENT ANASTAMOSIS IN MID RECTUM, RESCOPE IN 1 YR, DR. PADMAJA ESPARZA AT [facility]    COLONOSCOPY N/A 11/03/2022    DIFFUSE AREA OF MODERATELY FRIABLE MUCOSA IN ENTIRE COLON , DIVERSION COLITIS, PATENT AND HEALTHY ANASTAMOSIS, DR. PADMAJA ESPARZA AT [facility]    COLONOSCOPY N/A 12/04/2023    6 MM SESSILE SERRATED ADENOMA POLYP IN DESCENDING, PATENT ANASTAMOSIS IN DISTAL RECTUM, RESCOPE IN 2 YRS, DR. PADMAJA ESPARZA AT [facility]    COLONOSCOPY W/ POLYPECTOMY N/A 07/08/2019    15 MM TUBULOVILLOUS ADENOMA POLYP IN HEPATIC FLEXURE, 20 MMTUBULOVILLOUS ADENOMA WITH HIGH GRADE DYSPLASIA IN RECTOSIGMOID, 4 CM MASS IN MID RECTUM, PATH: INVASIVE MODERATELY DIFFERENTIATED ADENOCARCINOMA, DR. JENNIFER LI AT [facility]    DILATATION AND EVACUATION N/A 2009    ENDOSCOPY N/A 07/08/2019    LA GRADE A ESOPHAGITIS, GASTRITIS, ALL BIOPSIES BENIGN, DR. JENNIFER LI AT [facility]    ILEOSTOMY CLOSURE N/A 11/14/2022    Procedure: ILEOSTOMY TAKEDOWN;  Surgeon: Padmaja Esparza MD;  Location: [facility];  Service: General;  Laterality: N/A;    INCISION AND DRAINAGE PERIRECTAL ABSCESS N/A 08/14/2020    Procedure: INCISION AND DRAINAGE OF retrorectal dehiscence abcess with drain placement and irrigation;  Surgeon: Padmaja Esparza MD;  Location: [facility];  Service: General;  Laterality: N/A;    PAP SMEAR N/A 01/24/2014    SIGMOIDOSCOPY N/A 07/24/2019    INFILTRATIVE PARTIALLY OBSTRUCTING  LARGE RECTAL CANCER, AREA TATOOED, DR. GERONIMO ESPARZA AT Located [facility]    SIGMOIDOSCOPY N/A 11/23/2019    AN ULCERATED PARTIALLY OBSTRUCTING MASS IN MID RECTUM, AREA TATTOOED, DR. GERONIMO ESPARZA AT Loc[facility]    SIGMOIDOSCOPY N/A 07/22/2021    PATENT ANASTAMOSIS IN DISTAL RECTUM, RESCOPE IN 1 YR, DR. GERONIMO ESPARZA AT Loc[facility]    SIGMOIDOSCOPY N/A 09/11/2024    PATCHY INFLAMMATION AND EDEMA IN DESCENDING, AREA BIOPSIED AND WAS BENIGN, PATENT AND HEALTHY ANASTAMOSIS, HEMORRHOIDS,RESCOPE(CY) 12/2025, DR. GERONIMO ESPARZA AT Loc[facility]    TRANSRECTAL ULTRASOUND N/A 07/24/2019    Procedure: ULTRASOUND TRANSRECTAL;  Surgeon: Geronimo Esparza MD;  Location: [facility] ENDOSCOPY;  Service: General    URETEROSCOPY LASER LITHOTRIPSY WITH STENT INSERTION Right 10/30/2019    DR. ESTUARDO BEASLEY AT [place]    VAGINAL DELIVERY N/A 09/18/1998    VENOUS ACCESS DEVICE (PORT) INSERTION Left 08/09/2019    Procedure: INSERTION VENOUS ACCESS DEVICE;  Surgeon: Sj Joseph MD;  Location: [facility];  Service: General    VENOUS ACCESS DEVICE (PORT) INSERTION N/A 12/18/2020    Procedure: INSERTION VENOUS ACCESS DEVICE right side, removal venous access device left side;  Surgeon: Sj Joseph MD;  [facility];  Service: General;  Laterality: N/A;    WISDOM TOOTH EXTRACTION Bilateral 1993       HEMATOLOGIC/ONCOLOGIC HISTORY:      Rectal cancer, rectal ultrasound examination in July 2019 reported T3N0 disease without lymphadenopathy. She does have small pulmonary nodule 6-7 mm and 2 mm with indeterminate feature. There is no solid evidence of metastatic disease otherwise. Patient has stage IIA (T3N0M0) disease.  The patient is heterozygous factor V Leiden, was on prophylactic anticoagulation with Lovenox 40 mg daily given her increased risk of thrombosis with MediPort and GI malignancy.   PET scan on 8/8/2019 reported an 8 mm hypermetabolic right upper lobe pulmonary nodule, which is suspicious for metastatic as well.    Patient had a PowerPort placement on  the left upper chest by Dr. Joseph on 8/9/2019.  Patient was started on concurrent infusional 5-FU chemoradiation therapy on 8/12/2019, with planned complete surgical resection and further adjuvant chemotherapy with intention to cure the disease.   Patient underwent surgical resection of the primary rectal cancer by Dr. Ye on 12/2/2019.  Pathology evaluation reported residual T3N1 disease stage IIIb.  Diarrhea related to therapy and radiation.   Patient was started cycle 1 day 1 of adjuvant FOLFOX 6 on 1/23/2020.  On 2/5/2020 FOLFOX 6 cycle 2 delayed secondary to neutropenia.  Patient was given 3 days of Granix injection.  After cycle #2 we planned 3 days of Granix with each cycle.   Patient also intolerant of oral iron.  Patient received 2 doses of IV injectafer on 02/05/2020 and 02/12/2020.   02/12/2020 Proceed with cycle #2 of FOLFOX 6 with 3 days of Granix.  On 3/11/2020 cycle 4 postponed secondary to abdominal pain and occasional low-grade fevers.  CT scans ordered.  Cycle #4 resumed after CT scan revealed no evidence of disease.  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 Ye. patient has no fever.  Continue to observe.   Grade 3 hand-foot syndrome secondary to 5-FU after cycle #7 FOLFOX 6 chemotherapy, prompting ER visit.  Also has worsening peripheral neuropathy.   Cycle #8 FOLFOX 6 was given on 5/13/2020, with 50% dose reduction for both 5-FU and oxaliplatin, and also examination of bolus 5-FU.   PET scan examination on 5/21/2020 reported no evidence of metastatic disease in the chest abdomen pelvis.  Stable 6 mm RUL pulmonary nodule.  On 5/27/2020, I discussed with the patient that we can discontinue chemotherapy at this time.   Patient had a surgical procedure for low anterior colon resection, coloanal anastomosis on 8/3/2020.  CT scan for chest abdomen pelvis on 9/9/2020 reported a new 8 mm noncalcified pulmonary nodule in the left  lower lobe of the lung.  Otherwise stable right upper lobe 6 mm pulmonary nodule, and no evidence of disease recurrence in the abdomen or pelvis.  PET scan examination on 9/18/2020 reported multiple hypermetabolic small pulmonary nodules/ new pulmonary nodules.   Patient was started on pelvic chemotherapy with FOLFIRI regimen on 10/13/2020.   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 It reported tumor mutation burden 5 Muts/Mb, microsatellite stable, TP53 mutation R282W, and others.   Cycle #5 was without irinotecan, due to peripheral neuropathy.  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  Patient was switched to weight-based Lovenox injection.  Cycle #6 5-FU and irinotecan was delayed by 2 weeks because of the above incident.  Patient had a telemedicine evaluation at that the [facility] in February 2021.  They agreed with our treatment plan for palliative chemotherapy followed by maintenance chemotherapy.    PET scan examination on 4/6/2021 after cycle #12 palliative chemotherapy reported no evidence of hypermetabolic metastatic lesion.   Patient was started on maintenance chemotherapy with 5-FU and Avastin on 4/13/2021. (Unable to tolerate Xeloda because of a significant hand-foot syndrome).   PET scan on 9/24/2021 obtained after cycle #12 maintenance chemotherapy with 5-FU, leucovorin, Avastin reported no evidence for active disease. We recommended 3 months chemotherapy holiday.  CT scan examination on 3/15/2022 reported slightly disease progression with increase in size of small pulmonary nodule.  We started patient back on maintenance chemotherapy on 3/29/2022 treatment with 5-FU plus leucovorin and Avastin, repeating every 2 weeks.  After 6 more maintenance chemotherapy, CT scan for chest abdomen pelvis was done on 6/21/2022 which reported stable  sub-6 mm pulmonary nodules.  Patient had last maintenance chemotherapy on 6/7/2022.   Disease progression, patient was restarted back on chemotherapy with 5-FU leucovorin and bevacizumab 8/21/2024    The patient reports she has intermittent rectal bleeding and urgency, mucous with her stool, starting sometime in 2016. At that time she was referred to GI service, and was diagnosed of irritable bowel syndrome. Nevertheless she had worsening urgency for bowel movement, and had a couple of incidents losing control of stool. She was recently seen by Roland Thorpe MD again and had colonoscopy and EGD exam on 07/08/2019. She was found having a circumferential rectal mass. According to the procedure note, the patient had a fungating circumferential bleeding 4 cm mass in the middle rectum, at distance between 13 cm and 17 cm from the anus. Mass was causing partial obstruction. There were also colon polyps found at the hepatic flexure and also at the rectosigmoid junction 23 cm from the anus. Both were resected and retrieved. EGD examination reported grade A esophagitis at the GE junction and patchy discontinuous erythema and aggravation of the mucosa without bleeding in the stomach body. There is normal mucosa of the duodenum. Pathology evaluation from this procedure reported moderately differentiated adenocarcinoma involving the rectal mass. The rectal sigmoid junction polyp was tubular/tubulovillous adenoma with high grade dysplasia negative for invasive malignancy. The hepatic flexure polyp was also tubular/tubulovillous adenoma negative for high grade dysplasia or malignancy. The biopsy from the esophagus reports squamocolumnar mucosa with inflammatory changes suggestive of mild reflux esophagitis, negative for interstitial metaplasia. Gastric biopsy was benign and duodenal biopsy was also benign. There is no mention of H-pylori.     Patient was subsequently referred to colorectal surgeon Padmaja Ye MD for further  evaluation. The patient had CT scan examination for chest, abdomen and pelvis requested by Dr. Ye and were done on 07/13/2019. The study reported no evidence of lymphadenopathy in the abdomen and pelvis. There was wall thickening of the rectosigmoid junction. Normal spleen, pancreas, adrenal glands and kidneys. There was fatty liver infiltration but no focal lymphatic lesions. There was a small 6-7 mm noncalcified nodule in the right upper lobe and a tiny 3 mm subpleural nodule in the right middle lobe. No mediastinal or hilar lymphadenopathy.     Dr. Ye performed staging rectal ultrasound examination. This study reported tumor penetrating through the muscularis propria as T3 disease; there were no lymph nodes identified.    She had a hospitalization in late September 2019 because of newly discovered pulmonary emboli.  She was on prophylactic Lovenox prior to the incident of PE.  Now she is on full dose Xarelto anticoagulation.  Patient reports no further chest pain dyspnea.  She denies bleeding or bruising.  During her hospitalization, she was seen by Dr. Ye, who plans to have surgery 8 to 12 weeks after finishing radiation therapy.  She finished her radiation on 9/19/2019.     I noticed patient also presented to the emergency room on 10/1/2019 complaining of right flank area pain.  I reviewed the images studies and indeed she has a very small 1 to 2 mm obstructing kidney stone in the UV junction.  Patient is still symptomatic with some pains and dysuria.  She denies fever sweating or chills.    Laboratory study on 10/7/2019 reported normal CBC including hemoglobin 13.1, platelets 301,000, WBC 6170 and ANC 4900 lymphocytes 590 monocytes 480.      The nurse reported malfunction of port-a-catheter on 10/7/2019.  Port study on 10/8/2019 showed fibrin sheath around the distal aspect of the Mediport catheter in the SVC. This does not appear to hinder injection, but does prevent aspiration at this  time.    Patient underwent surgical resection of the colon on 12/2/2019 with Dr. Ye.  Pathology evaluation reported residual T3 disease, also 1 out of 14 lymph nodes positive for malignancy.  So this patient in either had at least stage IIIb disease (T3 N1 M0?).      Adjuvant chemotherapy FOLFOX 6 will be started on 1/22/2020.  Laboratory study reported iron saturation 10%, free iron 31 TIBC 319 and ferritin 168.  Her hemoglobin was 11.8, WBC 5600, and platelets 347,000.    Patient was here on 02/12/2020 for cycle 2 of her FOLFOX.  This is delayed x1 week secondary to neutropenia.  The patient ultimately received 3 days worth of Neupogen with recovery of her blood counts.  Of note, the patient struggled with significant nausea without any episodes of vomiting following cycle #1 of chemotherapy resulting in significant weight loss.  She is up 12 pounds over the last week as her appetite has normalized.  We will add Emend to her care plan.    She is having several loose stools per her colostomy and has been hesitant to take Imodium due to prior history of constipation.  I encouraged her to try this with a maximum of 8 tablets/day.  She denies any infectious symptoms including fevers or chills.  No excess bleeding or bruising noted.  She had the expected cold sensitivity related to the Oxaliplatin for about 3 days following treatment.    Labs from 02/12/2020 demonstrated total white blood cell count of 5.14, ANC of 3.06, hemoglobin of 11.2, platelets of 211,000.  She was found to be iron deficient last week and is intolerant to oral iron secondary to GI distress.  For this reason, she initiated IV iron therapy with Injectafer last week.  She had received her second dose 02/12/2020    Patient has normalized hemoglobin 12.2 on 2/26/2020.    She reported on 5/5/2020 she had a recent visit to the emergency department for what was suspected to be an allergic reaction.  She called our on-call service on Saturday with  reports of hand and face swelling.  She was instructed to proceed to the emergency department at which time she was assessed and prescribed a Medrol Dosepak.  She had just completed her 5-FU and was unhooked on Friday, 5/3/2020.  Her symptoms have improved.  She does report persistent hyperpigmentation and mild swelling of the palms of the hands but this is much improved.  It was her right hand specifically that was swollen.  Her facial swelling has resolved.  She continues on Cefdinir nd since has 1 day remaining, she was prescribed cefdinir for a UTI requiring hospitalization at the end of April.  Reports no new symptoms.  Her labs are stable.  She is scheduled for treatment again.    Patient states at this time she is not tolerating her chemotherapy well.     Patient seen in the emergency department on 5/2/2020 for what was suspected to be an allergic reaction.  She called our on-call service on Saturday explaining that she was experiencing hand and face swelling.  She was instructed to proceed to the emergency department at which time she was assessed and prescribed a Medrol Dosepak.  She had just completed her 5-FU and was unhooked on Friday, 5/1/2020.      She reports since her ED visit on 5/2/2020 her symptoms have not improved. Her hands and feet were swollen upon presenting to the ED and she could barely make a fist. She states that she feels so swollen she is not able to stand it. She states that her skin on the hands and feet are peeling extensively as well, besides changing color to more dark.     She also reports significant fatigue after her first week of the 5-FU treatment but she expected this side effect. She also notices significant increase in her neuropathy to the point that she is not able to even walk around in her home without her house slippers due to irritation from her rugs. She denies and associated nausea or vomiting at this time. She also has episodes of epistaxis every day after the  chemotherapy cycle #7.  She does report working full-time during the week of chemotherapy.    Laboratory studies, 5/13/2020, show moderate thrombocytopenia platelets 123,000, low normal WBC 4140 including ANC 2720 and normal hemoglobin 13.4.  Because significant hand-foot syndrome, will decrease both 5-FU and oxaliplatin by 50%, and eliminate bolus dose of 5-FU.    On 5/21/2020 patient had a PET scan performed which showed no convincing evidence of residual disease in abdomen or pelvis, no metastatic disease within the chest or neck.     Cycle #8 FOLFOX 6 was given on 5/13/2020, with 50% dose reduction for both 5-FU and oxaliplatin, and also examination of bolus 5-FU.  She states on 5/27/2020 that with the recent reduction of the chemotherapy she feels significantly better. She has more energy and is able to do her daily routine.      PET scan examination on 5/21/2020 reported no evidence of metastatic disease in chest abdomen pelvis.  There was a stable 6 mm right upper lobe pulmonary nodule.    Laboratory studies on 5/27/2020 showed mild leukocytopenia WBC 3720 but a normal ANC 2250 and lymphocytes 630.  Normal platelets 163,000 and hemoglobin 12.6.  Chemistry lab reported normal renal function, liver function panel and a electrolytes, elevated glucose 164.    Laboratory studies 6/24/2020, showed normal hemoglobin 13.4 but macrocytosis .9.  Normal platelets 210,000 and WBC 5870.  She had normal CMP.     Patient last time was here in late June 2020.  Since that time she had surgical procedure for low anterior colon resection, coloanal anastomosis on 8/3/2020.  She later developed a perirectal abscess, required surgical drainage on 8/14/2020.  She was discharged on 8/27/2020.    Patient reports to me she still has lower abdominal wall vacuum suction in place.  She denies fever sweating chills.  Performance status is ECOG 1.  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  She does have  visiting nurse come to the home for wound care.    CT scan for chest abdomen pelvis on 9/9/2020 reported a new 8 mm noncalcified pulmonary nodule in the left lower lobe.  Otherwise stable right upper lobe 6 mm pulmonary nodule, and no evidence of disease recurrence in the abdomen or pelvis.     Laboratory study on 9/16/2020 reported elevated AST 55, ALT 95, alk phosphatase 143 but normal total bilirubin 0.3.  Chemistry lab otherwise unremarkable.  She has completed normal CBC.      Because of the abnormal CT scan examination for chest abdomen pelvis on 9/9/2020 reported a new 8 mm noncalcified pulmonary nodule in the left lower lobe, we obtained a PET scan examination for further evaluation, which was done on 9/18/2020.  This study reported several pulmonary nodules, some of them are hypermetabolic, newly developed compared to previous PET scan in May 2020.  Certainly does highly suspicious for metastatic disease.  There are also hypermetabolic activity in the abdomen and pelvic area which is hard to differentiate from surgical scar versus metastatic malignancy.    Laboratory study on 10/13/2020 reported normal CBC with Hb 13.9, platelets 302,000 and WBC 5520 including ANC 3830.  Chemistry lab reported normalized AST 20, alk phosphatase 116 improved ALT 35, and maintains normal bilirubin, with normal electrolytes and liver function panel.     Patient was started on first cycle of palliative chemotherapy FOLFIRI on 10/13/2020.    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  Patient had a new port placed 12/18/2020, and had held her Xarelto for 2 days prior to surgery.  She presented to the ER on 12/21/20 with complaints of facial and arm swelling for 3 days.  She also noted increased shortness of breath.  She was found to have a thrombus of the SVC and left  brachiocephalic vein.  Thrombus within the right internal jugular vein cannot be excluded.  Patient was started on IV heparin, and eventually transitioned to weight-based Lovenox, which she now continues.    PET scan examination on 9/24/2021 reported further shrinking of the tiny right upper lobe pulmonary nodule.  Otherwise no new lesions.  No evidence for metastatic disease in other areas.      Patient had CT scan for chest with IV contrast obtained on 12/14/2021 which reported small tiny stable pulmonary nodules. There is a 4 mm right upper lobe pulmonary nodule. There is also a stable 4 mm left lower lobe pulmonary nodule. There is also stable left upper lobe micronodule.     Laboratory studies today on 12/21/2021 reported normal hemoglobin 15.9 however .0, platelets 218,000 WBC 5360 including neutrophils 3730 lymphocytes 980. Chemistry lab reported normal renal function, electrolytes, glucose, and marginally elevated ALT 55, AST 34 but normal total bilirubin and alk phosphatase.     CT scan for chest abdomen pelvis 6/21/2022 reported sub-6 mm pulmonary nodules either stable or slightly smaller.  No new pulmonary nodules or evidence for disease progression.  There is no evidence for metastatic disease in the liver however it shows diffuse hepatic steatosis.  There was masslike thickening in the presacral space with the clips or calcification approximately 1.7 cm in greatest AP dimension but stable.    Laboratory study on 9/20/2022 reported normal hemoglobin 15.7 with mild macrocytosis .1.  She has normal CBC and platelets.  She also has unremarkable CMP.  Normal CEA 1.13 ng/mL.    Patient was seen by Dr. Ye, who performed ileostomy takedown on 11/14/2022.  Patient reports that she is recovering.  She still has a small open wound less than 1 cm in diameter, however close to 2 cm deep.  She has been changing dressing herself.  She denies fever sweating chills.    Patient has no other specific  complaints.  She has excellent performance status ECOG 0.  She denies chest pain dyspnea cough hemoptysis.  No abdominal pain.  No melena hematochezia.  Patient has been eating well, stable weight.  She works full-time.    She continues Lovenox injections and denies any significant bleeding issues.   No other new problems or concerns.     CT scan for chest abdomen pelvis obtained on 1/10/2023 reported stable small pulmonary nodules, no evidence for active or new disease.    Laboratory study on 1/10/2023 reported normal CBC and normal CMP.  CEA level is 0.99 ng/mL.    CT scan for chest, abdomen, and pelvis on 7/7/2023 reported stable small pulmonary nodules including a left upper lobe 5 mm nodule. There were no new masses, or pleural effusion. No enlarged supraclavicular, axillary, mediastinal, or hilar lymphadenopathy. Mediastinal vasculature normal. There is occlusion of the superior vena around the catheter with body wall  is present. There was no evidence for disease recurrence in the abdomen or pelvis. Bone is also negative for metastatic disease.    Laboratory studies obtained on 07/07/2023 also reported normal CBC, and normal CMP as well as low level CEA 1.07 ng/mL.    The CT scan for the chest on 10/27/2023 reported enlarging pulmonary nodules bilaterally. The right upper lobe lung nodule increased from 7 x 7 mm to 9 x 8 mm, and the left upper lobe nodule measured 8 x 9 mm from 5 x 6 mm in 04/2023. There is a different left upper lobe nodule 6 x 8 mm from previous 5 x 5 mm. The presacral tissue thickness measures up to 2.3 cm.    A laboratory study on 10/27/2023 reported CEA 1.58 ng/mL, normal CBC, and CMP.  Molecular study of peripheral blood Guardant Reveal is still pending.    The patient presents today on 11/17/2023 for reevaluation to discuss the results of PET scan examination as well as the results of tumor conference. I saw her recently on 11/03/2023 and because of enlarging pulmonary nodules  on the CT scan, we requested a PET scan examination. I presented her case yesterday on 11/16/2023 at the [facility].    The patient reports she is at her baseline condition as 2 weeks ago. No specific complaints, no chest pain, dyspnea, cough, etc. She has a regular bowel movement.    Her case was present at the [facility]. on 11/16/2023. The PET scan images and chest CT scan were reviewed in conjunction with her treatment history. The consensus was for patient to receive stereotactic radiation therapy for the right upper lobe lung lesion, and the bigger left upper lobe lesion, and monitor the smaller left upper lobe lesion with further images studies down the line. Also, the conference recommended Guardant Reveal study which we already ordered.     This Guardant Reveal study came back today as negative for ctDNA 0%.      CT of the chest on 2/2/2024 reported shrinking of the right upper lobe lesion from 9 mm to 6 mm, and the left upper lobe lesion also decreased from 9 mm to 6 mm. Otherwise, there are a couple of stable pulmonary nodules, 7 to 8 mm. The right hilar has a small lymph node that has increased from 6 mm to 8 mm and is otherwise stable. There was no suspicious lesion in the abdomen or pelvis.    Colonoscopy examination 9/11/2024 showed patchy mild inflammation characterized by congestion/edema in the descending colon. Evidence of previous endo coloanal anastomosis in the rectum. Presence of hemorrhoids.      MEDICATIONS    Current Outpatient Medications:     bismuth subsalicylate (PEPTO BISMOL) 262 MG/15ML suspension, Take 15 mL by mouth 4 (Four) Times a Day., Disp: , Rfl:     clonazePAM (KlonoPIN) 1 MG tablet, Take 1 tablet as needed daily for anxiety. May take one additional as needed for severe anxiety., Disp: 45 tablet, Rfl: 3    Cyanocobalamin (VITAMIN B-12 PO), Take 1 tablet by mouth 3 (Three) Times a Week. M-W-F, Disp: , Rfl:     dicyclomine (BENTYL) 20 MG  tablet, TAKE 1 TABLET BY MOUTH EVERY 6 (SIX) HOURS AS NEEDED FOR IRRITABLE BOWEL SYMPTOMS, Disp: 360 tablet, Rfl: 2    diphenoxylate-atropine (LOMOTIL) 2.5-0.025 MG per tablet, TAKE 2 TABLETS BY MOUTH 4 TIMES A DAY, Disp: 240 tablet, Rfl: 11    Enoxaparin Sodium (LOVENOX) 100 MG/ML solution prefilled syringe syringe, INJECT 1 ML UNDER THE SKIN INTO THE APPROPRIATE AREA AS DIRECTED EVERY 12 (TWELVE) HOURS., Disp: 60 mL, Rfl: 2    escitalopram (LEXAPRO) 10 MG tablet, Take 1 tablet by mouth Daily., Disp: 90 tablet, Rfl: 1    famotidine (PEPCID) 20 MG tablet, TAKE 1 TABLET BY MOUTH TWICE A DAY, Disp: 180 tablet, Rfl: 2    HYDROcodone-acetaminophen (NORCO) 5-325 MG per tablet, Take 2 tablets by mouth Every 6 (Six) Hours As Needed for Moderate Pain., Disp: 90 tablet, Rfl: 0    hydrocortisone 2.5 % cream, APPLY RECTALLY 3 TIMES DAILY. INCLUDE APPLICATOR., Disp: , Rfl:     Hydrocortisone, Perianal, (ANUSOL-HC) 2.5 % rectal cream, Apply rectally 3 times daily.  Include applicator., Disp: 30 g, Rfl: 1    Loperamide HCl (IMODIUM PO), Take  by mouth As Needed., Disp: , Rfl:     Loratadine 10 MG capsule, Take 1 capsule by mouth Every Evening., Disp: , Rfl:     metoprolol succinate XL (TOPROL-XL) 25 MG 24 hr tablet, TAKE 0.5 TABLETS BY MOUTH EVERY NIGHT, Disp: 45 tablet, Rfl: 2    nystatin (MYCOSTATIN) 771353 UNIT/GM cream, Apply 1 Application topically to the appropriate area as directed 2 (Two) Times a Day., Disp: 30 g, Rfl: 2    nystatin-zinc oxide-hydrocortisone-bacitracin, Apply  topically to the appropriate area as directed Daily As Needed (As needed)., Disp: 60 g, Rfl: 2    octreotide (sandoSTATIN) 100 MCG/ML injection, Inject 1 mL under the skin into the appropriate area as directed 3 (Three) Times a Day., Disp: 90 mL, Rfl: 2    ondansetron (ZOFRAN) 8 MG tablet, TAKE 1 TABLET BY MOUTH THREE TIMES A DAY AS NEEDED FOR NAUSEA AND VOMITING, Disp: 60 tablet, Rfl: 1    pantoprazole (Protonix) 40 MG EC tablet, Take 1 tablet by  "mouth Daily., Disp: 90 tablet, Rfl: 1    rosuvastatin (CRESTOR) 5 MG tablet, Take 1 tablet by mouth Daily., Disp: 90 tablet, Rfl: 0    Syringe/Needle, Disp, 27G X 1/2\" 1 ML misc, Use as directed for octreotide injections, Disp: 100 each, Rfl: 3    ALLERGIES:   No Known Allergies    SOCIAL HISTORY:       Social History     Tobacco Use    Smoking status: Every Day     Current packs/day: 1.00     Average packs/day: 1 pack/day for 25.0 years (25.0 ttl pk-yrs)     Types: Cigarettes     Passive exposure: Current    Smokeless tobacco: Never    Tobacco comments:     1 PPD/caffeine use    Vaping Use    Vaping status: Some Days    Substances: Nicotine    Devices: Disposable    Passive vaping exposure: Yes   Substance Use Topics    Alcohol use: Not Currently     Comment: RARELY    Drug use: Never         FAMILY HISTORY:   Mother has positive factor V Leiden mutation, although she did not have thrombosis, mother also is coronary disease with stenting, she also is occasional bruising.    Maternal grandmother had DVT, she had multiple surgical procedures.    Patient mother's half-brother had metastatic colon cancer at diagnosis in his 50s.    Maternal great aunt had breast cancer.           Vitals:    03/05/25 0851   BP: (!) 85/75   Pulse: 72   Resp: 14   Temp: 97.4 °F (36.3 °C)   TempSrc: Oral   SpO2: 95%   Weight: 82.1 kg (181 lb)   Height: 165.1 cm (65\")   PainSc: 4    PainLoc: Rib Cage           3/5/2025     8:51 AM   Current Status   ECOG score 0     Physical Exam    GENERAL:  Well-developed, well-nourished female in no acute distress.    SKIN:  Warm, dry without rashes, purpura or petechiae.  HEENT:  Normocephalic.   LYMPHATICS:  No cervical, supraclavicular or axillary adenopathy.  CHEST: Normal respiratory effort.  Lungs clear to auscultation. Good airflow.  CARDIAC:  Regular rate and rhythm. Normal S1,S2.  ABDOMEN:  Soft, no tender.  Bowel sounds normal.  EXTREMITIES:  No lower extremity edema.    RECENT LABS:  Results " from last 7 days   Lab Units 03/05/25  0817   WBC 10*3/mm3 6.51   NEUTROS ABS 10*3/mm3 3.90   HEMOGLOBIN g/dL 14.1   HEMATOCRIT % 43.3   PLATELETS 10*3/mm3 203       Results from last 7 days   Lab Units 03/05/25  0817   SODIUM mmol/L 139   POTASSIUM mmol/L 3.8   CHLORIDE mmol/L 107   CO2 mmol/L 24.1   BUN mg/dL 5*   CREATININE mg/dL 0.54*   CALCIUM mg/dL 8.5*   ALBUMIN g/dL 3.3*   BILIRUBIN mg/dL 0.3   ALK PHOS U/L 90   ALT (SGPT) U/L 29   AST (SGOT) U/L 30   GLUCOSE mg/dL 104*       IMAGING:  CT Chest With Contrast Diagnostic, CT Abdomen Pelvis With Contrast  Narrative: CT CHEST W CONTRAST DIAGNOSTIC-, CT ABDOMEN PELVIS W CONTRAST-     HISTORY: Rectal cancer follow-up.     TECHNIQUE: CT of the chest, abdomen and pelvis was performed following  administration of intravenous contrast. Reformatted and MIP images were  reviewed. Oral contrast partially opacifies the bowel. Radiation dose  reduction techniques were utilized, including automated exposure control  and exposure modulation based on body size.     COMPARISON:  PET/CT 11/12/2024. CT chest 11/2/2024.     FINDINGS CHEST:       There is a right chest port. The SVC is diminutive. There are numerous  collateral vessels in the anterior chest wall, right greater than left.  Findings suggest chronic central venous occlusion.  Heart size is normal. There is no pericardial effusion. There is  incompletely assessed calcific coronary artery atherosclerosis. There is  trivial pleural fluid on the left.  There is degenerative disc disease. There is osseous demineralization.  There are old rib fractures.  The trachea is clear. There is mild bilateral curvilinear atelectasis  and or scarring. Previously noted groundglass opacity in the left lung  apex has resolved. There is no focal consolidation. A 0.5 cm nodule in  the anterior right upper lobe is slightly smaller compared to remote  prior examinations. A 0.6 cm nodule in the superior segment of the left  lower lobe is  similar to prior but new from remote prior examinations. A  0.6 cm nodule in the left upper lobe is similar to prior but also new  from remote prior examinations. A small nodule in the left upper lobe is  also not significantly changed (image 40).     FINDINGS ABDOMEN/PELVIS:     The liver is normal in size. No suspicious focal intrahepatic lesion is  identified. The gallbladder is surgically absent. The spleen is normal  in size. The pancreas is within normal limits. The adrenal glands are  within normal limits. The kidneys are within normal limits.  No pathologically enlarged abdominal or pelvic lymph nodes are  identified. There is mild calcific atherosclerosis. There is a  circumaortic left renal vein, a developmental variant.  There is an enteroenteric anastomosis in the right lower quadrant. There  are no dilated small bowel loops. Presacral soft tissue thickening is  not significantly changed. The bladder is mildly distended. The uterus  is present. There is no adnexal mass.  Numerous subcutaneous nodules and soft tissue gas overlying the  bilateral anterior lower quadrants are again demonstrated, likely  sequela of injections. Collateral vessels are noted. There is osseous  demineralization. There is degenerative disc disease. There is  transitional lumbosacral anatomy. Sclerotic osseous lesions are similar  to prior.           Impression: COMBINED IMPRESSION:     1.  Scattered bilateral pulmonary nodules measuring up to 0.6 cm, some  of which are similar to prior but new from more remote prior  examinations.  2.  Presacral soft tissue thickening, similar to prior.  3.  Chronic central venous occlusion.  4.  Additional chronic findings, as above.           This report was finalized on 2/4/2025 12:25 PM by Dr. Irene Dickey M.D  on Workstation: [id number]       Assessment & Plan  1. Chemotherapy follow-up.  Her white blood cell count is within normal limits at 6510, hemoglobin is satisfactory at 14.01, and  platelet count is also normal at 203,000. Liver function tests are unremarkable, although there is a slight decrease in albumin levels, likely due to suboptimal nutritional status. Glucose levels are well-controlled, kidney function is normal, and BUN is marginally low, which is not concerning. Electrolyte balance is generally good, with a minor exception of slightly low calcium, which would be corrected to about 9. She has been on the same dose of chemotherapy since December 2024. The recent exacerbation of diarrhea is not believed to be related to the current treatment regimen. Chemotherapy will be resumed with 5-FU at a reduced dose of 1440 mg/m² and bevacizumab at the same dose of 4 mg/kg. She will return in 2 days to discontinue the 5-FU infusion. Subsequently, she will return every 2 weeks for laboratory studies and consultations with JULIET/MD for chemotherapy. She will continue octreotide 100 mcg every 8 hours for chemotherapy-induced diarrhea, along with Imodium and Lomotil as needed. Lovenox subcutaneously every 12 hours for anticoagulation will also be continued, along with the rest of her medications.    2. Diarrhea.  She reports that her diarrhea has improved, with no issues for about a week and a half, except for a minor episode last night. She will continue octreotide 100 mcg every 8 hours for chemotherapy-induced diarrhea, along with Imodium and Lomotil as needed.    3. Fatigue.  She reports feeling tired lately, likely due to interrupted sleep from broken ribs.    4. Weight gain.  She has gained 3 pounds recently, reversing her previous weight loss trend.    Follow-up  The patient will follow up in 4 weeks for reevaluation prior to chemotherapy with routine labs, CBC, and CMP.    ASSESSMENT:   1.  Metastatic rectal cancer.   Initial rectal ultrasound examination reported T3N0 disease without lymphadenopathy.   CT scan of chest, abdomen and pelvis reported no lymphadenopathy in the abdomen, pelvis or  chest. She does have small pulmonary nodule 6-7 mm and 2 mm with indeterminate feature. There is no solid evidence of metastatic disease otherwise.   Based on the CT scan and rectal ultrasound imaging studies, this patient had stage IIA (T3N0M0) disease.   She had PET scan examination on 8/8/2019 which reported a hypermetabolic right upper lobe pulmonary nodule 6 mm with SUV 5.6.  This is suspicious for metastatic disease however it is too small to be biopsied.  This patient may have stage IV disease.   She initiated concurrent radiation with continuous 5-FU on 8/12/2019.  Patient finished concurrent chemoradiation therapy.  Patient underwent surgical resection of the rectal tumor and diverting loop ileostomy on 12/2/2019 with Dr. Ye.  Pathology evaluation reported residual T3 disease, with 1 out of 14 lymph nodes positive for malignancy.  Certainly this patient has at least stage IIIb rectal cancer (T3N1M0?)  On 1/22/2020 adjuvant chemotherapy FOLFOX 6 regimen initiated.   On 2/5/2022 cycle #2 was delayed secondary to neutropenia.  She was given 3 days of Granix.   Emend added with cycle 3.  With improved nausea control  Continuing home Granix x3 days following 5-FU disconnect  3/11/2020 due for cycle 4, however, she is experiencing progressive abdominal pain and occasional fevers.   CT scan performed on 3/13/2020 reveals no evidence of progressive or recurrent disease.  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  Patient hospitalized 4/24-4/26/20 after cycle 5 of chemotherapy with acute UTI.  CT abdomen/pelvis noting fluid collection in the presacral region having diminished in size compared to CT dated 3/13/2020.  Patient was evaluated by Dr. Ye while in the hospital with further plans to evaluate possible colovaginal fistula following completion of chemotherapy.  Patient did respond to IV antibiotics and discharged home on oral cefdinir.  4/29/2020 cycle 6 of FOLFOX.  Urinary symptoms have resolved   Patient  seen in the [facility] ED on 5/2/2020 for what was suspected to be an allergic reaction.  She called our service on Saturday explaining that she was experiencing hand and face swelling.  She was instructed to proceed to the emergency department at which time she was assessed and prescribed a Medrol Dosepak.  She had just completed her 5-FU and was unhooked on Friday, 5/1/2020.  Her symptoms have resolved in the office on assessment, 5/5/2020.  The patient had grade 3 hand-foot syndrome based on symptomology.  Patient had cycle #8 of 5-FU on 5/13/2020. Due to her symptoms and poor tolerance to the 5-FU, her treatment dose will be reduced 50% for oxaliplatin and infusional 5-FU.  Bolus 5-FU will be discontinued..  On 5/21/2020 patient had a PET scan and it showed no evidence of of metastatic disease in the neck, chest, abdomen or pelvis.  There was fluid accumulation/abscess.   On 5/27/2020 discussed with the patient that we can discontinue chemotherapy at this time.  She will follow-up with Dr. Ye to discuss a possibility to reverse the ileostomy.  We likely will obtain anther PET scan in 3 to 4 months for reassessment.    Patient was seen by Dr. Ye on 6/19/2019 for evaluation and to discuss possible take down of her ileostomy.  She is scheduled to have a gastrografin enema on 7/2/2020 to evaluate for a fistula, and then a colonoscopy on 7/15/2020, both done by Dr. Ye.  She states that based on the above imaging and procedure findings, she may have a more extensive surgery done or just have her ileostomy reversed, which would likely be done in August 2020.  This will all be coordinated under the care of Dr. Ye.     CT scan from 09/09/2020 and also compared to her previous PET scan examination from 05/21/2020 and also the original PET scan from 08/09/2019.  The original PET scan there is a small right upper lobe pulmonary nodule 6 mm with SUV 5.6. So in the 05/2020 PET scan the nodule is still there but  activity seems much less and this most recent CT scan examination also documented the preexisting small pulmonary nodule however there is a new left lower lobe pulmonary nodule 8 mm in size and I shared with the patient today this nodule is suspicious for metastatic disease. Laboratory studies reported minimal CEA level 1.32 on 09/09/2020. Liver function panel was only marginally abnormal with the ALT 45, the remaining is normal. However laboratory study today showed worsening AST 55, ALT 95 and alkaline phosphatase 143 but is still normal, total bilirubin 0.3.   Recommended to have repeat PET scan examination for assessment because the left lower lobe pulmonary nodule is too small to be biopsied, and if the PET scan reports hypermetabolic lesion, I recommended to have stereotactic radiation therapy. Explained to patient that she is not a really good candidate to have thoracotomy for resection because she still has unhealed wound in the abdomen. Stereotactic radiation therapy will likely be a more feasible choice.   PET scan examination obtained on 9/18/2020 showed metastatic disease.  It reported a few hypermetabolic pulmonary nodules, and some new small pulmonary nodules, all of them highly suspicious for metastatic disease.  She also has hypermetabolic activity in the abdomen and pelvic area which could be related to scar tissue from her recent surgery versus metastatic disease.  Nevertheless overall picture fits with metastatic cancer.  Discussed with the patient on 9/20/2020, we reviewed the PET scan images together, and I recommended to have systematic chemotherapy, I do not think stereotactic reading therapy to the hypermetabolic lesions in the lungs warranted at this time because even those will be treated with reading therapy, she will still need systematic chemotherapy.  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  I did discuss with the patient using anti-EGFR  monoclonal antibody versus anti-VEGF monoclonal antibody.     Palliative chemotherapy cycle#1 FOLFIRI was started on 10/13/2020.  No bolus 5-FU given considering previous poor tolerance.    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  Microsatellite stable, mutation burden 5/Mb.   Discussed with the patient 10/27/2020, because of the K-s[place] mutation, she is not a candidate for anti-EGFR monoclonal antibody such as Erbitux or vectibix.  She could be a candidate for anti-VEGF monoclonal antibody, however because of active wound, she is not a candidate right now at this moment.  Patient seen in the ED for acute right neck pain on 11/16/2020.  CT angiogram as well as CT of the cervical spine performed with no acute findings but notably one of her pulmonary nodules, left upper lobe, somewhat decreased in size.  Patient given prednisone pack.  Further details below.  Cycle #6 chemotherapy will be resumed on 1/5/2021.  Started back on original irinotecan.  PET scan examination obtained on 1/12/2021 after cycle #6 chemotherapy reported improved pulmonary nodule hypermetabolic activity.  Confirmed these are metastatic lesions.  Patient is evaluated 1/19/2020, will continue cycle #7 FOLFIRI chemotherapy.  However Avastin will be on hold see next section.   Patient was reevaluated on 2/2/2021, will continue chemotherapy cycle #8 FOLFIRI.  Avastin / biosimilar will be added.    She talked to [facility] specialist in mid February 2021, and had recommended palliative chemotherapy without surgical intervention of metastatic lung lesions.   On 3/2/2021, patient will proceed with cycle #10 FOLFIRI plus bevacizumab.  After 12 cycles, plan to start maintenance treatment.  3/16/2021 cycle 11.  Plus bevacizumab.  3/30/2021 cycle 12 FOLFIRI plus bevacizumab.  Having issues with worsening nausea despite premeds with dexamethasone, Aloxi, Emend, and Zofran at home.  Patient is requesting a  dose of Phenergan, which is helped her in the past.  We will give this to her with treatment today.  PET scan examination on 4/6/2021 reported no evidence of hypermetabolic metastatic lesion.  Discussed with the patient on 4/13/2021, we reviewed the PET scan results.  We recommended to switch to maintenance chemotherapy without irinotecan.  We will continue 5-FU and Avastin every 2 weeks.  We discussed possibility of switching to oral Xeloda treatment.  Discussed with the patient for side effects more so with hand-foot syndrome.  Patient is agreeable.   Xeloda 2000 mg twice daily 7 days on, 7 days off along with Avastin initiated 4/27/2021.  After only 5 doses of Xeloda significant hand-foot syndrome, Xeloda held. Patient requesting to be transitioned back to infusional 5-FU, as she felt that she tolerated infusional 5-FU without any problem.  The patient will receive 5-FU leucovorin and Avastin every 2 weeks.  Xeloda discontinued.  5/25/2021 continued cycle #4 maintenance 5-FU, leucovorin, Avastin tolerating this much better so far, we will continue this. Recommended to have 12 cycles of maintenance chemotherapy, then obtain PET scan for reevaluation.  We could discuss further treatment plan at that time.  9/14/2021 patient due for cycle 12 of additional maintenance 5-FU/leucovorin plus Avastin.  She continues to tolerate treatment well overall.  She is anxious in the office today with her Mediport not getting blood at first and also with upcoming scans being heavy on her mind.  She was instructed to take one of her Klonopin pills while here to help calm her mind.  Heart rate did improve from 140s down to 112.  Proceed with treatment today as scheduled.  PET scan examination on 9/24/2021 reported further shrinking of the right upper lobe tiny pulmonary nodule.  No other new lesions.  Discussed with patient on 9/24/2021 about further shrinking of the right upper lobe pulmonary nodule and no evidence for other new  lesions. We recommended to have chemo break for 3 months with repeated CT scan for reevaluation.  Recent CT scan for chest 12/14/2021 and abdomen CT from 11/29/2021 reported no evidence for active disease. The right upper lobe pulmonary nodule, left lower lobe pulmonary nodule minimal about 4 mm and stable. I discussed with patient today on 12/21/2021, recommended to give her another 3 months chemotherapy holiday and obtain CT scan afterwards for reevaluation. After discussion, patient is agreeable.   CT scan examination for chest abdomen and pelvis obtained on 3/15/2022 reported a slight increase of the left upper lobe pulmonary nodule 5 mm, from previous 3 mm.  The other pulmonary nodules were stable in size.  There is also small left upper lobe groundglass changes.   Dr. Nguyen reviewed images studies with the patient 3/23/2022, compared to multiple previous images including CT chest CT and PET scan, suspected disease progression.  Discussed with the patient, and I recommended to resume maintenance chemotherapy with 5-FU plus leucovorin plus Avastin repeating every 2 weeks, and obtaining CT scan for reassessment in 3 months.  Patient is agreeable.  Patient resumed maintenance chemotherapy on 3/29/2022 with 5-FU leucovorin and Avastin.   4/26/2022, proceed with cycle #27 maintenance 5-FU, leucovorin, and Avastin.  Patient continues to tolerate treatment well.    On 5/10/2022, we will proceed ahead with cycle #28 maintenance chemotherapy.  Plan to have CT scan after cycle #30.  5/24/2022 cycle 29 maintenance chemotherapy.  Continue to tolerate well.  6/7/2022 cycle #30 maintenance chemotherapy, continuing to tolerate well.   CT scan for chest abdomen pelvis with IV contrast obtained on 6/21/2022 reported sub-6 mm pulmonary nodules either stable or slightly smaller.  We reviewed the images with the patient together.  We discussed with the patient and recommended to hold chemotherapy for now with repeating CT scan in  3 months for reassessment.  CT scan of the chest abdomen pelvis 9/13/2022 reported stable condition, no evidence for recurrent or metastatic colon cancer.  On 1/10/2023 patient had a CT chest abdomen pelvis with reported no evidence for disease progression.  Laboratory study also showed normal CEA.   Patient had a CT scan for chest, abdomen, and pelvis obtained on 04/11/2023. This study reported very small pulmonary nodules, the right upper lobe nodule is stable to 6 mm, however, the left upper lobe and the left lower lobe nodule increased to 5 mm from previous 4 mm. I discussed with the patient today on 04/19/2023, I recommend to obtain another CT scan in 3 months for reassessment. The nodules are so small, they likely will not be picked up by PET scan examination.  CT scan examination of the chest, abdomen, and pelvis obtained on 7/7/2023 reported stable small pulmonary nodules. No evidence for disease progression or new lesions in chest, abdomen, and pelvis.  Discussed with patient today on 7/14/2023, however, patient is concerning for disease progression. I recommended to obtain Guardant Reveal for circulating tumor DNA study. If the study is positive, we will further obtain PET scan examination, otherwise, we will obtain CT scan for chest, abdomen, and pelvis in 3 months for reassessment.  The patient had CT scan for the chest, abdomen, and pelvis obtained on 10/27/2023, which reported worsening pulmonary nodules at bilateral upper lobes. Laboratory studies showed low normal CEA level and normal liver function panel. The Guardant Reveal study is still pending. I discussed this with the patient on 11/03/2023 and we shared the images, and I recommended having a PET scan examination for further assessment. I will present her case at the multimodality lung conference. If those lesions are deemed to be metastatic lesions, I recommend having stereotactic radiotherapy. I discussed it with the patient, and she voiced  understanding and was agreeable with that strategy.  I presented her case at the multimodality chest conference 11/16/2023.  The consensus was for patient to receive stereotactic radiation therapy for the right upper lobe lung lesion, and the bigger left upper lobe lesion, and monitor the smaller left upper lobe lesion with further images studies down the line. Also, the conference recommended Guardant Reveal study which we already ordered. I discussed with the patient today on 11/17/2023, we explained to the patient that the opinion of the conference and she is agreeable with this and prefers this strategy because of limited toxicity compared to long term commitment to starting chemotherapy again. I recommend to obtain a CT scan for the chest, abdomen, and pelvis with both IV and oral contrast for reassessment in 3 months for reassessment of metastatic lung lesions and thickness in the pelvis where the PET scan reported a low activity SUV 2.4 in the surgical bed.   The patient had steroid-induced radiation therapy for the right upper lobe and one of the left upper lobe lesions.  Her CT scan examination on 02/02/2024 reported shrinking nodules of the right upper lobe and one of the left upper lobe lesions, both from 9 mm down to 6 mm. There are 2 stable pulmonary nodules 7 mm. Nothing new.  02/09/2024, I discussed with the patient and recommended CT scan for the chest, abdomen, and pelvis in 3 months for reassessment. Guardant Reveal study was 0% ctDNA. We will also continue laboratory studies every 3 months for Guardant Reveal, together with routine labs, CBC, CMP, and CEA.  CT scan of the chest, abdomen, and pelvis conducted on 4/23/2024 revealed stable multiple pulmonary nodules, with no other new pulmonary nodules or evidence of disease recurrence or abnormal pelvis. The patient's liver function panel was normal, and low-level CEA level was also noted. The Guardant Reveal study was able to be obtained earlier  due to regulatory rules, and we hugo obtain today the Guardant reveal study, be available within 10 to 14 days.   Given the patient's metastatic liver lesion, and lung lesions from colon cancer, careful monitoring is necessary. A chest CT scan with IV contrast will be arranged in 3 months for reevaluation. The study will be reviewed again in 3 months, which will include CBC, CMP, and CEA level.   Imaging study with chest CT scan on 08/02/2024 reported multiple bilateral pulmonary nodules that are relatively stable. However, the Guardant Reveal reported positive ctDNA indicating disease progression. A subsequent PET scan examination on 08/14/2024 reported newly developed hypermetabolic pulmonary nodules. The highest SUV is 3.7, corresponding to an 8 mm new right upper lobe nodule, and there are several other hypometabolic nodules in the lungs.   8/21/2024 resumption of chemotherapy with 5-FU bevacizumab  Triage visit 8/28/2024 with intractable diarrhea that was unclear if this is secondary to chemotherapy or infectious etiology given her known chronic colitis, fever and abdominal pain.  Further management as outlined below  9/4/2024 per review today diarrhea has improved though she is having some difficulty with passage of gas and stool,?  Related to significant inflammation in the rectum versus tumor obstruction.  The passage of gas has improved in the last few days but she does still have to change positions on the toilet to get stool to pass without it being painful.  Discussed all of this with Dr. Nguyen and we will refer the patient urgently back to Dr. Ye for at the very least rectal examination in the office versus full repeated colonoscopy.  Because of the potential for examination or invasive procedures, we will hold bevacizumab today.  Because the patient had significant diarrhea last week and concerns that it may be related to chemotherapy we will decrease her 5-FU/leucovorin today by 30%.  If she does  well we can go back to full dose with cycle 3.     9/18/2024 she presented for evaluation prior to cycle #3 of palliative chemotherapy with 5-FU, leucovorin, and bevacizumab. Given her recent biopsy on 09/11/2024, it is prudent to postpone the Avastin treatment today to ensure safety.  Chemotherapy will be proceeded.      She presented for evaluation on 10/02/2024, tolerating chemotherapy except for diarrhea. This has been managed with octreotide. Proceed with cycle 4 chemotherapy today with 5-FU, leucovorin and resumption of bevacizumab.   Reevaluation 10/16/2024 due for cycle 5 5-FU, leucovorin, bevacizumab.  Due to ongoing significant diarrhea, bevacizumab will be placed on hold for potential surgical intervention.  Additionally, 5-FU will be dose reduced to 50%.  Additional adjustments as outlined below  Patient reviewed 10/30/2024 with improved tolerance to cycle five 5-FU, leucovorin.  We we will attempt to slightly escalate 5-FU dosing, increasing by 10% (40% dose reduction)  Patient did experience leakage of 5-FU, and return 10/31/2024 for evaluation of this.  She was sent for port dye study that showed correct location of her port, so 5-FU was resumed.  The patient's right rib pain is likely due to a new chair on the bone, as indicated by the PET scan. The PET scan revealed a new 2.8 x 1.3 cm groundglass opacity in the lateral aspect of the left apex with SUV 6.3. This appearance is nonspecific and may represent an infectious or inflammatory infiltrate. Continued monitoring and follow-up imaging are recommended.  The current regimen of 5-FU will be maintained, and Avastin will be reintroduced with full dose. 11/13/2024 will proceed ahead with 5-FU, leucovorin and bevacizumab.   12/4/2024 She also reports two bad days of diarrhea, likely due to Avastin. The dosage of Avastin will be reduced to 4 mg/kg. The 5-FU dosage will be increased to 1680 mg/m².   12/18/2024 proceed with 5-FU and Avastin continuing  previous dose reductions as she had excellent tolerance to treatment 2 weeks ago.   12/31/2024 patient reports tolerating chemotherapy with the same dose adjustment.  This is a 1 day early because of the closure of the New Year's day holiday tomorrow.  Next cycle will be resumed in 15 days back to her usual schedule.  Will plan to have CT scan for chest abdomen pelvis with IV contrast in early February 2024.  1/29/2025 proceed with 5-FU and bevacizumab maintaining previous dosing.  CT scan for chest abdomen pelvis on 1/31/2025 reported stable small pulmonary nodules.  No evidence for disease progression.  Due to profound diarrhea from chemotherapy, we decided to postpone next chemotherapy to 3/5/2025.    3/5/2025 patient has improved diarrhea. Chemotherapy will be resumed with reduction of 5-FU at 1440 mg/m² and bevacizumab at the same dose of 4 mg/kg. She will return in 2 days to discontinue the 5-FU infusion. Subsequently, she will return every 2 weeks for laboratory studies and consultations with JULIET/MD for chemotherapy. She will continue octreotide 100 mcg every 8 hours for chemotherapy-induced diarrhea, along with Imodium and Lomotil as needed. Lovenox subcutaneously every 12 hours for anticoagulation will also be continued, along with the rest of her medications.    2.  Intractable diarrhea due to chemotherapy.   Patient developed diarrhea on Saturday, 8/24/2024.  Is unclear if this is related to infection as she did have fevers at the time the diarrhea began or chemotherapy.  She does have chronic colitis noted on most recent PET scan, this therefore could be diverticulitis flare  GI panel and C. difficile negative  8/29/2024 she did receive a single dose of octreotide  Begin empiric Flagyl 500 mg 3 times daily x 10 days  8/30/2024 discussed negative stool studies.  We will add Levaquin to already prescribed Flagyl to complete management of diverticulitis.  It is unclear if the patient's symptoms are related  to diverticular flare given her previous abdominal pain and fever versus chemotherapy.  However, her symptoms are currently improved  9/4/2024 diarrhea has resolved.  Stools are now more soft with some form but she is having difficulty with passage of stool, having to change positions on the toilet to avoid pain.  We are referring back to Dr. Ye as detailed above.  She will finish out the Flagyl and Levaquin as prescribed having roughly 3 days left of both.  We will also adjust doses of 5-FU/leucovorin today with concern for potential chemotherapy-induced diarrhea.  If she does well this cycle we can increase back to full dose with cycle 3 per Dr. Nguyen.  On 9/18/2024 patient reports that she experienced significant diarrhea during cycle #2 chemotherapy on 09/04/2024, leading to a 30% dose reduction. Despite taking Lomotil 2 tablets four times a day, Imodium, and Pepto-Bismol, her diarrhea persisted. She received octreotide shots twice, which improved her symptoms from watery to mushy stools but did not fully resolve the issue. She will continue with the current regimen and consider adding octreotide to her home regimen if diarrhea starts at home. The potential side effects of octreotide, including nausea, were discussed.   10/1/2024 she reports using octreotide self-injection at home, which has significantly improved her diarrhea. Most of the time, she only requires it once a day and occasionally twice a day, depending on the severity of her diarrhea. She is satisfied with the results, which have improved her quality of life and ability to eat properly.   She will also use Lomotil and the Imodium as needed.  Patient is very tearful in the office 10/16/2024 regarding debilitating diarrhea and interference with quality of life.  She questions potential surgical intervention and placement of colostomy due to ongoing pain in her rectum and burning of her skin from diarrhea.  We discussed adjustments today including  increased dose of octreotide to 200 mcg 3 times daily for diarrhea.  Chemotherapy dose adjustments.  Diarrhea was slightly improved following most recent cycle of chemotherapy.  Continue supportive care  12/4/2024 patient reports that Avastin may be the agent causing her diarrhea.  So we will decrease dose by 20% and to see how things going.  Improved diarrhea with dose reduction to Avastin.  Continue previous dosing.   12/31/2024 She reports no significant diarrhea and continues to use octreotide three times a day, which appears to be effective.  1/15/2025 she does report exacerbation of diarrhea following most recent cycle which caused irritation of her internal hemorrhoids.  However, her symptoms were short-lived and resolved after a few days and she therefore wishes to maintain previous dosing  1/29/2025 she reports stable diarrhea following most recent cycle of chemotherapy, continuing octreotide  This patient developed severe diarrhea after most recent chemotherapy despite using octreotide as before.  She missed appointment 2/11/2025.  Today on 2/12/2025, we discussed and recommended to postpone next chemotherapy to 3/5/2025.  This will give patient a 5-week break from previous chemotherapy.   3/5/2025 she reports that her diarrhea has improved, with no issues for about a week and a half, except for a minor episode last night. She will continue octreotide 100 mcg every 8 hours for chemotherapy-induced diarrhea, along with Imodium and Lomotil as needed.    3.   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  Low-dose Lovenox discontinued and initiated  Xarelto 20 mg daily.  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  Patient held Xarelto for 2 days prior to MediPort removal from the left chest wall and placement of new port in the right chest wall on 12/18/2020.  She resumed Xarelto that evening.  Progressive swelling in the neck, face, upper extremities prompting hospitalization with CT scan showing thrombosis in the right subclavian vein and SVC.  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  Although she had been off of Xarelto for a few days, clearly Xarelto was not prohibiting progression of her thrombosis after she resumed treatment and there was some evidence to suggest thrombosis had been present at least in the innominate vein on prior scan in September but now appears chronic.  Current acute thrombosis involving SVC and right subclavian.  Patient was admitted and placed on heparin drip  Patient was evaluated by vascular surgery and intervention was not recommended for thrombolysis/thrombectomy.  On 12/22/2020, the patient developed worsening shortness of breath, increasing upper extremity edema consistent with worsening SVC syndrome.  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  Symptoms improved and patient was discharged 12/24/2020 on Lovenox 100 mg every 12 hours.    Returns 12/29/2020 for evaluation continuing Lovenox, overall tolerating it well.  No bleeding issues.  Improved edema 1/5/2021.  Will continue Lovenox weight-based every 12 hours.  On 1/19/2021 and 2/2/2021, patient reports improved facial swelling.  She however does have being bruise on her abdomen wall where she does Lovenox injection.  However she does not have a  spontaneous epistaxis, gum bleeding or other bleeding.   On 2/2/2021, we discussed that side effects of Avastin/biosimilar related to thrombosis.  Since this patient already has been on Lovenox, I think the benefits from treatment for her cancer will outweigh that risk of thrombosis, will proceed ahead with Avastin.  I cautioned patient to watch out for signs of worsening thrombosis patient voiced understanding.   5/11/2021 Lovenox dosing will be adjusted due to patient's weight loss.  We discussed she needs 1 mg/kg.  Her syringes are 100 mg syringes she will do 0.9 mL subcu every 12 hours.  8/3/2021 Patient continues to have bruising on abdomen from lovenox injections.  Will need to monitor weight and adjust dosing in future if further weight loss.   Stable condition on 9/28/2021.  Continue Lovenox anticoagulation.    Patient reports no chest pain no dyspnea no leg edema. However she had bruising and also most recently abnormal small abscess for which she actually went to ER on 11/29/2021, had I&D. The wound is healing. No further drainage. Denies fever sweating or chills. After discussion, she will continue Lovenox injection for now.   On 3/23/2022, patient reports good tolerance of Lovenox.  Nevertheless she still has small quantity of pus in the left lower abdomen abscess, she squeezes it every day to prevent it became worse.  She reports no fever sweating chills.  Recommended to start Augmentin 875 mg twice a day for 7 days.  She will continue Lovenox indefinitely.  On 4/12/2022, patient reports resolution of the small abscess at left lower abdominal wall.   On 5/10/2022, patient continues on Lovenox indefinitely.  No easy bleeding or bruising.  6/23/2022 continuing on Lovenox 1 mg/kg at a dose of 100 mg (patient weight is 96.6 kg today) every 12 hours.  On 1/17/2023, patient reports good tolerance, Lovenox will be continued.    Patient had a venous Doppler study on 02/05/2023, which reported acute left upper  extremity DVT in the subclavian vein, axillary and the brachial vein, and also superficial thrombophlebitis in the basilic upper arm.   On 04/19/2023, patient reports that she was not compliant with anticoagulation at the time of recurrent DVT. She now is fully compliant and is using Lovenox.  Continues on anticoagulation without signs or symptoms of bleeding.  She does have occasional irritation and bleeding with wiping related to frequent diarrhea  Due to right sided pleuritic pain the patient was seen in the emergency department 10/22/2024 with a negative CT angiogram.  Without thrombosis.  Anticoagulation continued.  Patient had CTA again on 11/2/2024 due to continued pleuritic pain that was negative for PE.  1/29/2025 tolerating anticoagulation with Lovenox.  This will be continued.  Patient did have a fall and has bruising on her left low back and flank.  Thankfully, no other bleeding  CT scan 1/31/2025 reported chronic central venous thrombosis.  Patient will continue Lovenox anticoagulation.      4.  Mild anemia.   She also has history of iron deficiency.  Iron studies reveal iron saturation of 10%.  She was started on oral iron but was unable to tolerate due to GI side effects.   Status post Injectafer 2/5/2020 and 2/12/2020   Improved hemoglobin 13.5 on 1/5/2021.  3/16/2020 when hemoglobin now up to 16.2, hematocrit 47.6.  Patient admits that she has started smoking again, and I have encouraged her to quit.  She denies any snoring or sleep apnea diagnosis.  Patient is not taking oral iron.  We will closely monitor.  3/30/2020 hemoglobin 15.7, HCT 47.5.  Patient states that she has cut back significantly on her smoking, although not quit yet.  I have encouraged her to continue working on this.  We will continue to closely monitor.  Laboratory studies 6/22/2021 reported excellent folate > 20 ng/mL, however low normal B12 level 357 pg/mL.    On 7/6/2021, normal hemoglobin 15.8, .9 and HCT 47.2%.   Patient was asked to start oral vitamin B12 at 1000 mcg daily.   Lab study on 12/14/2021 reported excellent ferritin 296 and iron saturation 25% with free iron 104 TIBC 424. Hemoglobin was 15.2 with .2.   Normal hemoglobin 14.6 on 3/15/2022.  Iron study reported normal ferritin 129.5 ng/mL however slightly low iron saturation 11%.  Continue to monitor for now.  On 1/29/2025 hemoglobin is normal at 13.6.   3/5/2025 hemoglobin 14.1.    5.  Peripheral neuropathy secondary to chemotherapy.   This is mild involving bilateral hands and feet as reported on 9/16/2020.   Will avoid chemotherapy with oxaliplatin.  Stable mild neuropathy as of 11/10/2020  Patient reports worsening peripheral neuropathy and cycle #5 chemotherapy on 12/8/2020 with and without irinotecan.   On 1/5/2021, patient reports improvement of peripheral neuropathy, will add irinotecan back to chemotherapy.  Continue to monitor and adjust medication.  Stable.      6.  Depression.  Patient seen by JULIET Davenport on 11/9/2020.  She was started on mirtazapine.  Lexapro was discontinued.  This has definitely improved her mood with the patient feeling overall much better.  She is sleeping better.  Appetite is improved with her actually eating more than she wants to.    Condition is stable.  She plans to continue follow-up with supportive oncology.        7. Malfunction of the portal catheter  Patient reports there was no blood drawn from the portal catheter. CT scan of the chest on 7/7/2023 reported occlusion of the SVC around to the Port-A-Cath tubing. I recommend trying activase to see if it helps.  Otherwise, we may have to remove the catheter. Clinically, patient has no signs of SVC syndrome.  On 11/03/2023, the patient reports that her Port-A-Cath was able to be used for a CT scan for the injection of intravenous dye; however, there was no blood return, so we needed to draw from her arm for the blood test. We will continue to keep Port-A-Cath  for now.  On 02/09/2024, the patient reports that the port was able to be flushed. However, no blood was returned. We will continue to flush every 6 weeks.  9/4/2024 she continues to have no blood return from her port but can taste saline as we flush at each time and is functioning well otherwise.  Monitor.  10/16/2024 no specific problem.  Port dye study 10/31/2024 showing correct position of port.  Fibrin sheath present.  No issue today.    8. Internal hemorrhoids.  She has large internal hemorrhoids diagnosed during a flexible sigmoidoscopy on 09/11/2024. Hydrocortisone cream was prescribed for treatment.   Not active problem today.    9.  This patient also has strong family history for malignancy   The patient had appointment with genetic counseling on September 3, 2019.  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    10. Hypertension.  Continue management of hypertension and follow up with PCP.      11.  Chest and back pain  Ports this has been ongoing for about 4 weeks.  Started in her right chest/rib cage.  Originally this pain had completely resolved, however returned when she received her next chemotherapy infusion. She felt like the pain worsened after she was unhooked from her 5-FU and that the pain spread to her right scapular/back area.  Since unhook she has continued with pain in her right chest/back that has required her to take pain medicine regularly which is not typical for her.  She has pain with deep inspiration and pain with certain movements.  She also has pain when she pushes on the tissue around her port, though there is no redness or warmth around her port site aside from red rash in the shape of a bandaid and she reports being sensitive to bandaids.  Reviewed all of patients symptoms with Dr. Nguyen who recommended obtaining doppler right upper extremity and PET scan for further evaluation of this severe right sided pain that has been evaluated by CTA without any signs of what could be  causing her pain.  Of note she was seen in the ED 11/2/2024, had CT chest performed which was negative for PE.  EKG and troponin looked okay.  She was discharged home.    Healing fracture of the right 2nd rib. The PET scan on 11/12/2024 showed new hypermetabolic activity at a healing fracture on the anterior aspect of the right second rib with SUV 5.7. The patient should avoid activities that may exacerbate the pain and consider pain management options as needed.    12/4/2024 the patient reports improvement in rib pain, with the ability to lay on the affected side, though still experiencing some soreness and pain upon sneezing or deep breathing.    Carole Weaver reports a pain score of 4.  Given her pain assessment as noted, treatment options were discussed and the following options were decided upon as a follow-up plan to address the patient's pain: continuation of current treatment plan for pain.      12.  IV access.  Patient had a PowerPort placement on the left upper chest by Dr. Joseph on 8/9/2019.      PLAN:    3/5/2025 patient has improved diarrhea. Chemotherapy will be resumed with reduction of 5-FU at 1440 mg/m² and bevacizumab at the same dose of 4 mg/kg. Leucovorin will be same dose 280 mg/m².  She will continue octreotide 100 mcg every 8 hours for chemotherapy-induced diarrhea, along with Imodium and Lomotil as needed.   She will return in 2 days to discontinue the 5-FU infusion.   Subsequently, she will return every 2 weeks for laboratory studies and evaluation with JULIET/MD for chemotherapy.   Continue Lovenox subcutaneously every 12 hours for anticoagulation.  Continue Magic barrier cream as needed.   Continue Norco 5/325 as needed for pain.  1 to 2 tablets.  The patient was encouraged to utilize this for her musculoskeletal pain following her fall  Continue Protonix 40 mg daily.   Continue Gas-X.     I discussed with the patient about laboratory results and further management plan.  Patient voiced  understanding and agreeable.    The patient is on a high risk medication requiring close monitoring for toxicity.       Dong Nguyen MD PhD  03/05/2025        CC:  Deepika Vela III NP-C   MD Alverto Bowers MD

## 2025-03-05 NOTE — PROGRESS NOTES
"OUTPATIENT ONCOLOGY NUTRITION ASSESSMENT    Patient Name: Carole Weaver  YOB: 1980  MRN: [id number]  Assessment Date: 3/5/2025    COMMENTS:  Initial nutrition assessment for pt with metastatic rectal cancer. Other pertinent medical history includes colostomy with reversal.  Pt is receiving her #13 of FOLFOX.  Labs/Meds from today reviewed. Weight hx reviewed indicating loss of 20lbs in the past 6 months.     Met with pt today in the infusion center.  She reports a good appetite but has had some very bad bouts of diarrhea and due to her surgery and location of her tumor, she doesn't have much of a rectal vault or rectal tone. Diarrhea has prevented her from being able to leave her house.  Has lomotil and imodium but has had a few episodes of constipation which she wants to avoid.    Explained RD role and the importance of adequate nutrition and hydration during treatment.  Encouraged patient to focus on getting adequate calories, protein and soluble fiber in order to assist with bulking up her stools. Reviewed examples of soluble and insoluble fiber foods. Also discussed use of Enterade on the days she has her diarrhea, she can use it up to 2 times a day if needed.  Provided one box to take home, she is eager to try it.     Provided the [employer] booklet \"Nutrition during Cancer Treatment\" as a resource as well as RD contact info for any questions. Will follow throughout treatment course and be available as needed. Pt very appreciative of visit.        Reason for Assessment New assessment, Education      Diagnosis/Problem   Metastatic Rectal Cancer   Treatment Plan Chemotherapy  FOLFOX   Frequency Q 2 weeks   Goal of cancer treatment Disease control   Comments:          Encounter Information        Nutrition/Diet History:  Regular diet   Oral Nutrition Supplements:    Factors/Symptoms Affecting Intake: Diarrhea   Comments:      Medical/Surgical History Past Medical History:   Diagnosis " Date    Abdominopelvic abscess 08/12/2020    ADMITTED TO [facility]    Abnormal Pap smear of cervix 02/02/1998    JULIUS I    Abscess of abdominal wall 11/28/2012    SEEN AT [facility] ER    Anemia in pregnancy     Anxiety     Bilateral epiphora 04/2020    Bilateral hand swelling 05/02/2020    SEEN AT [facility] ER    Cervical lymphadenitis 06/06/2012    SEEN AT [facility] ER    Chemotherapy induced diarrhea 01/2021    Chemotherapy induced neutropenia 04/2020    Chemotherapy-induced nausea 02/2020    Chemotherapy-induced thrombocytopenia 05/2020    Chronic anticoagulation     Chronic diarrhea     Colon polyps     FOLLOWED BY DR. 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 ALEXANDRU HUNT    History of gestational diabetes     History of pre-eclampsia 1998    History of pre-eclampsia     History of radiation therapy 2019    FOLLOWED BY DR. 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 GERONIMO Sun  of right breast     SWOLLEN LYMPH NODE    Lung cancer 2020    METASTATIC LUNG CANCER    Macrocytosis 2021    Monopolar depression     On anticoagulant therapy     Pain associated with surgical procedure 2020    Palmar-plantar erythrodysesthesia 2021    Perirectal abscess 2020    Pulmonary embolism without acute cor pulmonale 09/20/2019    X 3; ADMITTED TO [facility]    Pulmonary nodule, right 2020    Rectal cancer 2019    STAGE IIA, INVASIVE MODERATELY DIFFERENTIATED ADENOCARCINOMA, CHEMO AND XRT FINISHED 2019    Right shoulder pain 2020    SEEN AT [facility] ER    Right ureteral stone 10/01/2019    SEEN AT [facility] ER    SAB (spontaneous ) 1996     A2-1 INDUCED    Sciatica     Sepsis due to cellulitis 2002    LEFT GREAT TOE, ADMITTED TO [facility]    Tachycardia 2020    Thrombosis of superior vena cava 2020    AND BRACHIOCEPHALIC VEIN, ADMITTED TO [facility]    Urinary urgency 2020       Past Surgical History:   Procedure Laterality Date    ABDOMINAL SURGERY      CHOLECYSTECTOMY N/A 10/10/2003    LAPAROSCOPIC WITH CHOLANGIOGRAM, DR. JAMEY TALAVERA AT [facility]    COLON RESECTION N/A 2019    Procedure: laparoscopic low anterior colon resection with mobilization of splenic flexure and diverting loop ileostomy: ERAS;  Surgeon: Padmaja Esparza MD;  Location: [facility] MAIN OR;  Service: General    COLON RESECTION N/A 2020    Procedure: LOW ANTERIOR COLON RESECTION, COLOANAL ANASTOMOSIS, MOBILIZATION SPLENIC FLEXURE;  Surgeon: Padmaja Esparza MD;  Location: [facility] MAIN OR;  Service: General;  Laterality: N/A;    COLONOSCOPY N/A 07/15/2020    PATENT ANASTAMOSIS IN MID RECTUM, RESCOPE IN 1 YR, DR. PADMAJA ESPARZA AT [facility]    COLONOSCOPY N/A 2022    DIFFUSE AREA OF MODERATELY FRIABLE MUCOSA IN ENTIRE COLON , DIVERSION COLITIS, PATENT AND HEALTHY ANASTAMOSIS, DR. PADMAJA ESPARZA AT [facility]    COLONOSCOPY N/A 2023    6 MM SESSILE SERRATED ADENOMA POLYP IN DESCENDING,  PATENT ANASTAMOSIS IN DISTAL RECTUM, RESCOPE IN 2 YRS, DR. PADMAJA ESPARZA AT [facility]    COLONOSCOPY W/ POLYPECTOMY N/A 07/08/2019    15 MM TUBULOVILLOUS ADENOMA POLYP IN HEPATIC FLEXURE, 20 MMTUBULOVILLOUS ADENOMA WITH HIGH GRADE DYSPLASIA IN RECTOSIGMOID, 4 CM MASS IN MID RECTUM, PATH: INVASIVE MODERATELY DIFFERENTIATED ADENOCARCINOMA, DR. JENNIFER LI AT [facility]    DILATATION AND EVACUATION N/A 2009    ENDOSCOPY N/A 07/08/2019    LA GRADE A ESOPHAGITIS, GASTRITIS, ALL BIOPSIES BENIGN, DR. JENNIFER LI AT [facility]    ILEOSTOMY CLOSURE N/A 11/14/2022    Procedure: ILEOSTOMY TAKEDOWN;  Surgeon: Padmaja Esparza MD;  Location: [facility] OR;  Service: General;  Laterality: N/A;    INCISION AND DRAINAGE PERIRECTAL ABSCESS N/A 08/14/2020    Procedure: INCISION AND DRAINAGE OF retrorectal dehiscence abcess with drain placement and irrigation;  Surgeon: Padmaja Esparza MD;  Location: [facility] OR;  Service: General;  Laterality: N/A;    PAP SMEAR N/A 01/24/2014    SIGMOIDOSCOPY N/A 07/24/2019    INFILTRATIVE PARTIALLY OBSTRUCTING LARGE RECTAL CANCER, AREA TATOOED, DR. PADMAJA ESPARZA AT [facility]    SIGMOIDOSCOPY N/A 11/23/2019    AN ULCERATED PARTIALLY OBSTRUCTING MASS IN MID RECTUM, AREA TATTOOED, DR. PADMAJA ESPARZA AT [facility]    SIGMOIDOSCOPY N/A 07/22/2021    PATENT ANASTAMOSIS IN DISTAL RECTUM, RESCOPE IN 1 YR, DR. PADMAJA ESPARZA AT [facility]    SIGMOIDOSCOPY N/A 09/11/2024    PATCHY INFLAMMATION AND EDEMA IN DESCENDING, AREA BIOPSIED AND WAS BENIGN, PATENT AND HEALTHY ANASTAMOSIS, HEMORRHOIDS,RESCOPE(CY) 12/2025, DR. PADMAJA ESPARZA AT [facility]    TRANSRECTAL ULTRASOUND N/A 07/24/2019    Procedure: ULTRASOUND TRANSRECTAL;  Surgeon: Padmaja Esparza MD;  Location: [facility] ENDOSCOPY;  Service: General    URETEROSCOPY LASER LITHOTRIPSY WITH STENT INSERTION Right 10/30/2019    DR. ESTUARDO BEASLEY AT [facility]    VAGINAL DELIVERY N/A 09/18/1998    VENOUS ACCESS DEVICE (PORT) INSERTION Left 08/09/2019    Procedure: INSERTION  "VENOUS ACCESS DEVICE;  Surgeon: Sj Joseph MD;  [facility] [facility] [place] [facility]  Service: General    VENOUS ACCESS DEVICE (PORT) INSERTION N/A 12/18/2020    Procedure: INSERTION VENOUS ACCESS DEVICE right side, removal venous access device left side;  Surgeon: Sj Joseph MD;  [place] [facility] [place] [facility]  Service: General;  Laterality: N/A;    WISDOM TOOTH EXTRACTION Bilateral 1993               Anthropometrics        Current Height Ht Readings from Last 1 Encounters:   03/05/25 165.1 cm (65\")      Current Weight Wt Readings from Last 1 Encounters:   03/05/25 82.1 kg (181 lb)      BMI  29.5   Usual Body Weight (UBW) 200lb 6 months ago   Weight Change/Trend Loss   Weight History Wt Readings from Last 30 Encounters:   03/05/25 0851 82.1 kg (181 lb)   02/13/25 1345 80.4 kg (177 lb 3.2 oz)   01/29/25 1131 82.8 kg (182 lb 9.6 oz)   01/15/25 1043 86 kg (189 lb 8 oz)   12/31/24 0927 85.8 kg (189 lb 3.2 oz)   12/18/24 0910 85.4 kg (188 lb 3.2 oz)   12/10/24 1148 85 kg (187 lb 8 oz)   12/04/24 1017 85.2 kg (187 lb 12.8 oz)   11/13/24 0919 86.7 kg (191 lb 3.2 oz)   11/08/24 1345 87 kg (191 lb 11.2 oz)   10/30/24 0926 86.5 kg (190 lb 9.6 oz)   10/22/24 0518 87.1 kg (192 lb)   10/16/24 0934 89 kg (196 lb 1.6 oz)   10/02/24 1019 87 kg (191 lb 14.4 oz)   09/18/24 0830 87.1 kg (192 lb)   09/11/24 1118 88 kg (194 lb)   09/04/24 0856 90.8 kg (200 lb 3.2 oz)   08/30/24 1203 89.5 kg (197 lb 4.8 oz)   08/29/24 1230 87.7 kg (193 lb 6.4 oz)   08/28/24 1429 87 kg (191 lb 14.4 oz)   08/21/24 0840 89.8 kg (198 lb)   08/16/24 1529 90.2 kg (198 lb 12.8 oz)   08/14/24 1253 92.5 kg (204 lb)   07/08/24 1111 92.5 kg (204 lb)   06/26/24 0831 92.6 kg (204 lb 3.2 oz)   05/03/24 1448 92.3 kg (203 lb 6.4 oz)   05/01/24 1001 91.4 kg (201 lb 6.4 oz)   03/22/24 0901 90.7 kg (200 lb)   02/09/24 1053 89 kg (196 lb 3.2 oz)   02/09/24 0918 88.8 kg (195 lb 12.8 oz)          Medications           Current medications: Cyanocobalamin, " "HYDROcodone-acetaminophen, Hydrocortisone (Perianal), Loperamide HCl, Loratadine, Syringe/Needle (Disp), bismuth subsalicylate, clonazePAM, dicyclomine, diphenoxylate-atropine, enoxaparin sodium, escitalopram, famotidine, hydrocortisone, metoprolol succinate XL, nystatin, nystatin-zinc oxide-hydrocortisone-bacitracin, octreotide, ondansetron, pantoprazole, and rosuvastatin                 Tests/Procedures        Tests/Procedures Chemotherapy     Labs       Pertinent Labs    Results from last 7 days   Lab Units 03/05/25  0817   SODIUM mmol/L 139   POTASSIUM mmol/L 3.8   CHLORIDE mmol/L 107   CO2 mmol/L 24.1   BUN mg/dL 5*   CREATININE mg/dL 0.54*   CALCIUM mg/dL 8.5*   BILIRUBIN mg/dL 0.3   ALK PHOS U/L 90   ALT (SGPT) U/L 29   AST (SGOT) U/L 30   GLUCOSE mg/dL 104*     Results from last 7 days   Lab Units 03/05/25  0817   HEMOGLOBIN g/dL 14.1   HEMATOCRIT % 43.3   WBC 10*3/mm3 6.51   ALBUMIN g/dL 3.3*     Results from last 7 days   Lab Units 03/05/25  0817   PLATELETS 10*3/mm3 203     COVID19   Date Value Ref Range Status   12/22/2020 Not Detected Not Detected - Ref. Range Final     No results found for: \"HGBA1C\"       Physical Findings        Physical Appearance alert, overweight     Edema  no edema   Gastrointestinal diarrhea   Tubes/Lines/Drains Implantable Port   Oral/Mouth Cavity WNL   Skin Intact       Estimated/Assessed Needs        Energy Requirements    Height for Calculation      Weight for Calculation 82 kg   Method for Estimation  25 kcal/kg   EST Needs (kcal/day) 2050 kcals per day       Protein Requirements    Weight for Calculation 82 kg   EST Protein Needs (g/kg) 1.2 gm/kg   EST Daily Needs (g/day) 98 gms per day       Fluid Requirements     Method for Estimation 1 mL/kcal    Estimated Needs (mL/day) 2000 mLs per day         NUTRITION INTERVENTION / PLAN OF CARE      Intervention Goal(s) Maintain nutrition status, Reduce/improve symptoms, Provide information, Meet estimated needs, Disease " management/therapy, Tolerate PO , Maintain intake, and Maintain weight         RD Intervention/Action Encouraged intake, Follow Tx progress, Recommended/ordered         Recommendations:       PO Diet Consume calorie and protein dense soluble fiber healthy foods, 6 small meals      Supplements Boost or Ensure if needed      Snacks       Other Enterade qd when having diarrhea   New Prescription Ordered? No, recommend   --      Monitor/Evaluation Follow up as needed   Education Education provided, Will provide eduction as needed   --    Electronically signed by:  Kelley Gilman RD  03/05/25 13:23 EST

## 2025-03-06 RX ORDER — HYDROCORTISONE 25 MG/G
CREAM TOPICAL
Qty: 30 G | Refills: 1 | Status: SHIPPED | OUTPATIENT
Start: 2025-03-06

## 2025-03-07 ENCOUNTER — INFUSION (OUTPATIENT)
Dept: ONCOLOGY | Facility: HOSPITAL | Age: 45
End: 2025-03-07
Payer: COMMERCIAL

## 2025-03-07 DIAGNOSIS — Z79.899 ENCOUNTER FOR LONG-TERM (CURRENT) USE OF HIGH-RISK MEDICATION: Primary | ICD-10-CM

## 2025-03-07 DIAGNOSIS — C20 ADENOCARCINOMA OF RECTUM: ICD-10-CM

## 2025-03-07 DIAGNOSIS — Z45.2 ENCOUNTER FOR FITTING AND ADJUSTMENT OF VASCULAR CATHETER: ICD-10-CM

## 2025-03-07 DIAGNOSIS — C78.00 MALIGNANT NEOPLASM METASTATIC TO LUNG, UNSPECIFIED LATERALITY: ICD-10-CM

## 2025-03-07 PROCEDURE — 25010000002 HEPARIN LOCK FLUSH PER 10 UNITS: Performed by: INTERNAL MEDICINE

## 2025-03-07 RX ORDER — SODIUM CHLORIDE 0.9 % (FLUSH) 0.9 %
10 SYRINGE (ML) INJECTION AS NEEDED
Status: DISCONTINUED | OUTPATIENT
Start: 2025-03-07 | End: 2025-03-07 | Stop reason: HOSPADM

## 2025-03-07 RX ORDER — HEPARIN SODIUM (PORCINE) LOCK FLUSH IV SOLN 100 UNIT/ML 100 UNIT/ML
500 SOLUTION INTRAVENOUS AS NEEDED
Status: DISCONTINUED | OUTPATIENT
Start: 2025-03-07 | End: 2025-03-07 | Stop reason: HOSPADM

## 2025-03-07 RX ORDER — SODIUM CHLORIDE 0.9 % (FLUSH) 0.9 %
10 SYRINGE (ML) INJECTION AS NEEDED
OUTPATIENT
Start: 2025-03-07

## 2025-03-07 RX ORDER — HEPARIN SODIUM (PORCINE) LOCK FLUSH IV SOLN 100 UNIT/ML 100 UNIT/ML
500 SOLUTION INTRAVENOUS AS NEEDED
OUTPATIENT
Start: 2025-03-07

## 2025-03-07 RX ADMIN — Medication 500 UNITS: at 10:13

## 2025-03-07 RX ADMIN — Medication 10 ML: at 10:13

## 2025-03-19 ENCOUNTER — OFFICE VISIT (OUTPATIENT)
Dept: ONCOLOGY | Facility: CLINIC | Age: 45
End: 2025-03-19
Payer: COMMERCIAL

## 2025-03-19 ENCOUNTER — INFUSION (OUTPATIENT)
Dept: ONCOLOGY | Facility: HOSPITAL | Age: 45
End: 2025-03-19
Payer: COMMERCIAL

## 2025-03-19 VITALS
HEART RATE: 85 BPM | TEMPERATURE: 98 F | WEIGHT: 180 LBS | BODY MASS INDEX: 29.99 KG/M2 | DIASTOLIC BLOOD PRESSURE: 72 MMHG | OXYGEN SATURATION: 98 % | RESPIRATION RATE: 16 BRPM | SYSTOLIC BLOOD PRESSURE: 104 MMHG | HEIGHT: 65 IN

## 2025-03-19 DIAGNOSIS — T45.1X5A CHEMOTHERAPY INDUCED DIARRHEA: ICD-10-CM

## 2025-03-19 DIAGNOSIS — Z79.899 ENCOUNTER FOR LONG-TERM (CURRENT) USE OF HIGH-RISK MEDICATION: ICD-10-CM

## 2025-03-19 DIAGNOSIS — K52.1 CHEMOTHERAPY INDUCED DIARRHEA: ICD-10-CM

## 2025-03-19 DIAGNOSIS — C20 ADENOCARCINOMA OF RECTUM: ICD-10-CM

## 2025-03-19 DIAGNOSIS — G89.3 CANCER ASSOCIATED PAIN: ICD-10-CM

## 2025-03-19 DIAGNOSIS — Z79.899 ENCOUNTER FOR LONG-TERM (CURRENT) USE OF HIGH-RISK MEDICATION: Primary | ICD-10-CM

## 2025-03-19 DIAGNOSIS — C20 ADENOCARCINOMA OF RECTUM: Primary | ICD-10-CM

## 2025-03-19 DIAGNOSIS — C78.00 MALIGNANT NEOPLASM METASTATIC TO LUNG, UNSPECIFIED LATERALITY: ICD-10-CM

## 2025-03-19 DIAGNOSIS — E86.0 DEHYDRATION: ICD-10-CM

## 2025-03-19 DIAGNOSIS — Z79.899 HIGH RISK MEDICATION USE: ICD-10-CM

## 2025-03-19 DIAGNOSIS — R19.7 INTRACTABLE DIARRHEA: ICD-10-CM

## 2025-03-19 DIAGNOSIS — C20 ADENOCARCINOMA OF RECTUM: Chronic | ICD-10-CM

## 2025-03-19 DIAGNOSIS — Z79.01 ANTICOAGULATION ADEQUATE: ICD-10-CM

## 2025-03-19 LAB
ALBUMIN SERPL-MCNC: 3.7 G/DL (ref 3.5–5.2)
ALBUMIN/GLOB SERPL: 1.4 G/DL
ALP SERPL-CCNC: 92 U/L (ref 39–117)
ALT SERPL W P-5'-P-CCNC: 14 U/L (ref 1–33)
ANION GAP SERPL CALCULATED.3IONS-SCNC: 8 MMOL/L (ref 5–15)
AST SERPL-CCNC: 14 U/L (ref 1–32)
BASOPHILS # BLD AUTO: 0.02 10*3/MM3 (ref 0–0.2)
BASOPHILS NFR BLD AUTO: 0.3 % (ref 0–1.5)
BILIRUB SERPL-MCNC: 0.2 MG/DL (ref 0–1.2)
BILIRUB UR QL STRIP: NEGATIVE
BUN SERPL-MCNC: 6 MG/DL (ref 6–20)
BUN/CREAT SERPL: 12.2 (ref 7–25)
CALCIUM SPEC-SCNC: 8.6 MG/DL (ref 8.6–10.5)
CHLORIDE SERPL-SCNC: 106 MMOL/L (ref 98–107)
CLARITY UR: CLEAR
CO2 SERPL-SCNC: 25 MMOL/L (ref 22–29)
COLOR UR: YELLOW
CREAT SERPL-MCNC: 0.49 MG/DL (ref 0.57–1)
DEPRECATED RDW RBC AUTO: 50.4 FL (ref 37–54)
EGFRCR SERPLBLD CKD-EPI 2021: 118.6 ML/MIN/1.73
EOSINOPHIL # BLD AUTO: 0.15 10*3/MM3 (ref 0–0.4)
EOSINOPHIL NFR BLD AUTO: 2.6 % (ref 0.3–6.2)
ERYTHROCYTE [DISTWIDTH] IN BLOOD BY AUTOMATED COUNT: 13.8 % (ref 12.3–15.4)
GLOBULIN UR ELPH-MCNC: 2.6 GM/DL
GLUCOSE SERPL-MCNC: 103 MG/DL (ref 65–99)
GLUCOSE UR STRIP-MCNC: NEGATIVE MG/DL
HCT VFR BLD AUTO: 43.4 % (ref 34–46.6)
HGB BLD-MCNC: 13.7 G/DL (ref 12–15.9)
HGB UR QL STRIP.AUTO: ABNORMAL
IMM GRANULOCYTES # BLD AUTO: 0.01 10*3/MM3 (ref 0–0.05)
IMM GRANULOCYTES NFR BLD AUTO: 0.2 % (ref 0–0.5)
KETONES UR QL STRIP: NEGATIVE
LEUKOCYTE ESTERASE UR QL STRIP.AUTO: NEGATIVE
LYMPHOCYTES # BLD AUTO: 1.4 10*3/MM3 (ref 0.7–3.1)
LYMPHOCYTES NFR BLD AUTO: 23.9 % (ref 19.6–45.3)
MAGNESIUM SERPL-MCNC: 2.2 MG/DL (ref 1.6–2.6)
MCH RBC QN AUTO: 31.2 PG (ref 26.6–33)
MCHC RBC AUTO-ENTMCNC: 31.6 G/DL (ref 31.5–35.7)
MCV RBC AUTO: 98.9 FL (ref 79–97)
MONOCYTES # BLD AUTO: 0.46 10*3/MM3 (ref 0.1–0.9)
MONOCYTES NFR BLD AUTO: 7.9 % (ref 5–12)
NEUTROPHILS NFR BLD AUTO: 3.81 10*3/MM3 (ref 1.7–7)
NEUTROPHILS NFR BLD AUTO: 65.1 % (ref 42.7–76)
NITRITE UR QL STRIP: NEGATIVE
NRBC BLD AUTO-RTO: 0 /100 WBC (ref 0–0.2)
PH UR STRIP.AUTO: 6 [PH] (ref 4.5–8)
PLATELET # BLD AUTO: 159 10*3/MM3 (ref 140–450)
PMV BLD AUTO: 8.9 FL (ref 6–12)
POTASSIUM SERPL-SCNC: 3.8 MMOL/L (ref 3.5–5.2)
PROT SERPL-MCNC: 6.3 G/DL (ref 6–8.5)
PROT UR QL STRIP: NEGATIVE
RBC # BLD AUTO: 4.39 10*6/MM3 (ref 3.77–5.28)
SODIUM SERPL-SCNC: 139 MMOL/L (ref 136–145)
SP GR UR STRIP: 1.01 (ref 1–1.03)
UROBILINOGEN UR QL STRIP: ABNORMAL
WBC NRBC COR # BLD AUTO: 5.85 10*3/MM3 (ref 3.4–10.8)

## 2025-03-19 PROCEDURE — 83735 ASSAY OF MAGNESIUM: CPT | Performed by: INTERNAL MEDICINE

## 2025-03-19 PROCEDURE — 25010000002 FOSAPREPITANT PER 1 MG: Performed by: NURSE PRACTITIONER

## 2025-03-19 PROCEDURE — 85025 COMPLETE CBC W/AUTO DIFF WBC: CPT

## 2025-03-19 PROCEDURE — 96375 TX/PRO/DX INJ NEW DRUG ADDON: CPT

## 2025-03-19 PROCEDURE — 96413 CHEMO IV INFUSION 1 HR: CPT

## 2025-03-19 PROCEDURE — 25010000002 PALONOSETRON PER 25 MCG: Performed by: NURSE PRACTITIONER

## 2025-03-19 PROCEDURE — 25010000002 FLUOROURACIL PER 500 MG: Performed by: NURSE PRACTITIONER

## 2025-03-19 PROCEDURE — 80053 COMPREHEN METABOLIC PANEL: CPT | Performed by: INTERNAL MEDICINE

## 2025-03-19 PROCEDURE — 81003 URINALYSIS AUTO W/O SCOPE: CPT

## 2025-03-19 PROCEDURE — 25810000003 SODIUM CHLORIDE 0.9 % SOLUTION 250 ML FLEX CONT: Performed by: NURSE PRACTITIONER

## 2025-03-19 PROCEDURE — 96367 TX/PROPH/DG ADDL SEQ IV INF: CPT

## 2025-03-19 PROCEDURE — G0498 CHEMO EXTEND IV INFUS W/PUMP: HCPCS

## 2025-03-19 PROCEDURE — 25010000002 BEVACIZUMAB 100 MG/4ML SOLUTION 4 ML VIAL: Performed by: NURSE PRACTITIONER

## 2025-03-19 PROCEDURE — 25010000002 DEXAMETHASONE SODIUM PHOSPHATE 100 MG/10ML SOLUTION: Performed by: NURSE PRACTITIONER

## 2025-03-19 PROCEDURE — 25010000002 LEUCOVORIN CALCIUM PER 50 MG: Performed by: NURSE PRACTITIONER

## 2025-03-19 RX ORDER — SODIUM CHLORIDE 9 MG/ML
20 INJECTION, SOLUTION INTRAVENOUS ONCE
Status: DISCONTINUED | OUTPATIENT
Start: 2025-03-19 | End: 2025-03-19 | Stop reason: HOSPADM

## 2025-03-19 RX ORDER — PALONOSETRON 0.05 MG/ML
0.25 INJECTION, SOLUTION INTRAVENOUS ONCE
Status: COMPLETED | OUTPATIENT
Start: 2025-03-19 | End: 2025-03-19

## 2025-03-19 RX ORDER — PALONOSETRON 0.05 MG/ML
0.25 INJECTION, SOLUTION INTRAVENOUS ONCE
Status: CANCELLED | OUTPATIENT
Start: 2025-03-19

## 2025-03-19 RX ORDER — ONDANSETRON 8 MG/1
TABLET, FILM COATED ORAL
Qty: 60 TABLET | Refills: 1 | Status: SHIPPED | OUTPATIENT
Start: 2025-03-19

## 2025-03-19 RX ORDER — SODIUM CHLORIDE 9 MG/ML
20 INJECTION, SOLUTION INTRAVENOUS ONCE
Status: CANCELLED | OUTPATIENT
Start: 2025-03-19

## 2025-03-19 RX ADMIN — BEVACIZUMAB 270 MG: 100 INJECTION, SOLUTION INTRAVENOUS at 10:21

## 2025-03-19 RX ADMIN — DEXAMETHASONE SODIUM PHOSPHATE 12 MG: 10 INJECTION, SOLUTION INTRAMUSCULAR; INTRAVENOUS at 10:56

## 2025-03-19 RX ADMIN — PALONOSETRON HYDROCHLORIDE 0.25 MG: 0.25 INJECTION INTRAVENOUS at 10:55

## 2025-03-19 RX ADMIN — LEUCOVORIN CALCIUM 480 MG: 350 INJECTION, POWDER, LYOPHILIZED, FOR SUSPENSION INTRAMUSCULAR; INTRAVENOUS at 11:14

## 2025-03-19 RX ADMIN — FLUOROURACIL 2880 MG: 50 INJECTION, SOLUTION INTRAVENOUS at 11:48

## 2025-03-19 RX ADMIN — FOSAPREPITANT 100 ML: 150 INJECTION, POWDER, LYOPHILIZED, FOR SOLUTION INTRAVENOUS at 09:03

## 2025-03-19 NOTE — PROGRESS NOTES
REASON FOR FOLLOW UP:     *. Rectal cancer, rectal ultrasound examination in July 2019 reported T3N0 disease without lymphadenopathy. She does have small pulmonary nodule 6-7 mm and 2 mm with indeterminate feature. There is no solid evidence of metastatic disease otherwise. Patient has stage IIA (T3N0M0) disease.  Post neoadjuvant chemoradiation therapy, patient underwent surgical resection of the primary rectal cancer by Dr. Ye on 12/2/2019.  Pathology evaluation reported residual T3N1 disease stage IIIb.   PET scan examination on 9/18/2020 reported multiple hypermetabolic small pulmonary nodules/ new pulmonary nodules.   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 It reported tumor mutation burden 5 Muts/Mb, microsatellite stable, TP53 mutation R282W, and others.   Patient had a telemedicine evaluation at that the [facility] [place] in February 2021.  They agreed with our treatment plan for palliative chemotherapy followed by maintenance chemotherapy.     *.The patient is heterozygous factor V Leiden, was on prophylactic anticoagulation with Lovenox 40 mg daily given her increased risk of thrombosis with MediPort and GI malignancy.   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  Patient was switched to weight-based Lovenox injection.        HISTORY OF PRESENT ILLNESS:  The patient is a 44 y.o. female with the above-mentioned history, who is seen today for evaluation.     The patient returns to the office today patient pharmacy Avastin which she continues to receive every 2 weeks.  She reports her most recent cycle of chemotherapy was tolerated very well.  She had 1 day of diarrhea as her bowels remain well-controlled.  Her energy is adequate.  Her appetite is adequate.  She is active accomplish activities that she desires.  She continues on anticoagulation  without signs or symptoms of bleeding, she has no signs or symptoms of thrombosis.    Past Medical History:   Diagnosis Date    Abdominopelvic abscess 08/12/2020    ADMITTED TO [facility]    Abnormal Pap smear of cervix 02/02/1998    JULIUS I    Abscess of abdominal wall 11/28/2012    SEEN AT [facility] ER    Anemia in pregnancy     Anxiety     Bilateral epiphora 04/2020    Bilateral hand swelling 05/02/2020    SEEN AT [facility] ER    Cervical lymphadenitis 06/06/2012    SEEN AT [facility] ER    Chemotherapy induced diarrhea 01/2021    Chemotherapy induced neutropenia 04/2020    Chemotherapy-induced nausea 02/2020    Chemotherapy-induced thrombocytopenia 05/2020    Chronic anticoagulation     Chronic diarrhea     Colon polyps     FOLLOWED BY DR. 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 ALEXANDRU HUNT    History of gestational diabetes     History of pre-eclampsia 1998    History of pre-eclampsia     History of radiation therapy 2019    FOLLOWED BY DR. JAVON LEWIS    History of TB skin testing 01/17/2009    TB Skin Test    History of TB skin testing 01/17/2009    TB Skin Test    History of transfusion 2019    12/2019    HPV in female 1998    Hypokalemia 09/2019    Hypomagnesemia 09/2019    Hyponatremia 06/2021    IBS (irritable bowel syndrome)     Ileostomy present 04/25/2020    Take down on 11/3/2022    Infected insect bite of neck 05/27/2016    SEEN AT [facility]    Kidney  stones 2007    SEEN AT [facility] ER    Low anterior resection syndrome 2022    FOLLOWED BY DR. PADMAJA ESPARZA    Lump of right breast     SWOLLEN LYMPH NODE    Lung cancer 2020    METASTATIC LUNG CANCER    Macrocytosis 2021    Monopolar depression     On anticoagulant therapy     Pain associated with surgical procedure 2020    Palmar-plantar erythrodysesthesia 2021    Perirectal abscess 2020    Pulmonary embolism without acute cor pulmonale 09/20/2019    X 3; ADMITTED TO [facility]    Pulmonary nodule, right 2020    Rectal cancer 2019    STAGE IIA, INVASIVE MODERATELY DIFFERENTIATED ADENOCARCINOMA, CHEMO AND XRT FINISHED 2019    Right shoulder pain 2020    SEEN AT [facility] ER    Right ureteral stone 10/01/2019    SEEN AT [facility] ER    SAB (spontaneous ) 1996     A2-1 INDUCED    Sciatica     Sepsis due to cellulitis 2002    LEFT GREAT TOE, ADMITTED TO [facility]    Tachycardia 2020    Thrombosis of superior vena cava 2020    AND BRACHIOCEPHALIC VEIN, ADMITTED TO [facility]    Urinary urgency 2020   Patient had COVID-19 infection diagnosed on 2022 despite was fully vaccinated and boosted.  She recovered without problem.      Past Surgical History:   Procedure Laterality Date    ABDOMINAL SURGERY      CHOLECYSTECTOMY N/A 10/10/2003    LAPAROSCOPIC WITH CHOLANGIOGRAM, DR. JAMEY TALAVERA AT [facility]    COLON RESECTION N/A 2019    Procedure: laparoscopic low anterior colon resection with mobilization of splenic flexure and diverting loop ileostomy: ERAS;  Surgeon: Padmaja Esparza MD;  Location: [facility] OR;  Service: General    COLON RESECTION N/A 2020    Procedure: LOW ANTERIOR COLON RESECTION, COLOANAL ANASTOMOSIS, MOBILIZATION SPLENIC FLEXURE;  Surgeon: Padmaja Esparza MD;  Location: [facility] MAIN OR;  Service: General;  Laterality: N/A;    COLONOSCOPY N/A 07/15/2020    PATENT ANASTAMOSIS IN MID RECTUM, RESCOPE IN 1 YR, DR. PADMAJA ESPARZA AT [facility]     COLONOSCOPY N/A 11/03/2022    DIFFUSE AREA OF MODERATELY FRIABLE MUCOSA IN ENTIRE COLON , DIVERSION COLITIS, PATENT AND HEALTHY ANASTAMOSIS, DR. PADMAJA ESPARZA AT [facility]    COLONOSCOPY N/A 12/04/2023    6 MM SESSILE SERRATED ADENOMA POLYP IN DESCENDING, PATENT ANASTAMOSIS IN DISTAL RECTUM, RESCOPE IN 2 YRS, DR. PADMAJA ESPARZA AT [facility]    COLONOSCOPY W/ POLYPECTOMY N/A 07/08/2019    15 MM TUBULOVILLOUS ADENOMA POLYP IN HEPATIC FLEXURE, 20 MMTUBULOVILLOUS ADENOMA WITH HIGH GRADE DYSPLASIA IN RECTOSIGMOID, 4 CM MASS IN MID RECTUM, PATH: INVASIVE MODERATELY DIFFERENTIATED ADENOCARCINOMA, DR. JENNIFER LI AT [facility]    DILATATION AND EVACUATION N/A 2009    ENDOSCOPY N/A 07/08/2019    LA GRADE A ESOPHAGITIS, GASTRITIS, ALL BIOPSIES BENIGN, DR. JENNIFER LI AT [facility]    ILEOSTOMY CLOSURE N/A 11/14/2022    Procedure: ILEOSTOMY TAKEDOWN;  Surgeon: Padmaja Esparza MD;  Location: [facility];  Service: General;  Laterality: N/A;    INCISION AND DRAINAGE PERIRECTAL ABSCESS N/A 08/14/2020    Procedure: INCISION AND DRAINAGE OF retrorectal dehiscence abcess with drain placement and irrigation;  Surgeon: Padmaja Esparza MD;  Location: [facility];  Service: General;  Laterality: N/A;    PAP SMEAR N/A 01/24/2014    SIGMOIDOSCOPY N/A 07/24/2019    INFILTRATIVE PARTIALLY OBSTRUCTING LARGE RECTAL CANCER, AREA TATOOED, DR. PADMAJA ESPARZA AT [facility]    SIGMOIDOSCOPY N/A 11/23/2019    AN ULCERATED PARTIALLY OBSTRUCTING MASS IN MID RECTUM, AREA TATTOOED, DR. PADMAJA ESPARZA AT [facility]    SIGMOIDOSCOPY N/A 07/22/2021    PATENT ANASTAMOSIS IN DISTAL RECTUM, RESCOPE IN 1 YR, DR. PADMAJA ESPARZA AT [facility]    SIGMOIDOSCOPY N/A 09/11/2024    PATCHY INFLAMMATION AND EDEMA IN DESCENDING, AREA BIOPSIED AND WAS BENIGN, PATENT AND HEALTHY ANASTAMOSIS, HEMORRHOIDS,RESCOPE(CY) 12/2025, DR. PADMAJA ESPARZA AT [facility]    TRANSRECTAL ULTRASOUND N/A 07/24/2019    Procedure: ULTRASOUND TRANSRECTAL;  Surgeon: Padmaja Esparza MD;  Location: [facility]  ENDOSCOPY;  Service: General    URETEROSCOPY LASER LITHOTRIPSY WITH STENT INSERTION Right 10/30/2019    DR. ESTUARDO BEASLEY AT [place]    VAGINAL DELIVERY N/A 09/18/1998    VENOUS ACCESS DEVICE (PORT) INSERTION Left 08/09/2019    Procedure: INSERTION VENOUS ACCESS DEVICE;  Surgeon: Sj Joseph MD;  Location: [facility] OR [facility];  Service: General    VENOUS ACCESS DEVICE (PORT) INSERTION N/A 12/18/2020    Procedure: INSERTION VENOUS ACCESS DEVICE right side, removal venous access device left side;  Surgeon: Sj Joseph MD;  Location: [facility] OR [facility];  Service: General;  Laterality: N/A;    WISDOM TOOTH EXTRACTION Bilateral 1993       HEMATOLOGIC/ONCOLOGIC HISTORY:      Rectal cancer, rectal ultrasound examination in July 2019 reported T3N0 disease without lymphadenopathy. She does have small pulmonary nodule 6-7 mm and 2 mm with indeterminate feature. There is no solid evidence of metastatic disease otherwise. Patient has stage IIA (T3N0M0) disease.  The patient is heterozygous factor V Leiden, was on prophylactic anticoagulation with Lovenox 40 mg daily given her increased risk of thrombosis with MediPort and GI malignancy.   PET scan on 8/8/2019 reported an 8 mm hypermetabolic right upper lobe pulmonary nodule, which is suspicious for metastatic as well.    Patient had a PowerPort placement on the left upper chest by Dr. Joseph on 8/9/2019.  Patient was started on concurrent infusional 5-FU chemoradiation therapy on 8/12/2019, with planned complete surgical resection and further adjuvant chemotherapy with intention to cure the disease.   Patient underwent surgical resection of the primary rectal cancer by Dr. Ye on 12/2/2019.  Pathology evaluation reported residual T3N1 disease stage IIIb.  Diarrhea related to therapy and radiation.   Patient was started cycle 1 day 1 of adjuvant FOLFOX 6 on 1/23/2020.  On 2/5/2020 FOLFOX 6 cycle 2 delayed secondary to neutropenia.  Patient was given 3 days of Granix  injection.  After cycle #2 we planned 3 days of Granix with each cycle.   Patient also intolerant of oral iron.  Patient received 2 doses of IV injectafer on 02/05/2020 and 02/12/2020.   02/12/2020 Proceed with cycle #2 of FOLFOX 6 with 3 days of Granix.  On 3/11/2020 cycle 4 postponed secondary to abdominal pain and occasional low-grade fevers.  CT scans ordered.  Cycle #4 resumed after CT scan revealed no evidence of disease.  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 Ye. patient has no fever.  Continue to observe.   Grade 3 hand-foot syndrome secondary to 5-FU after cycle #7 FOLFOX 6 chemotherapy, prompting ER visit.  Also has worsening peripheral neuropathy.   Cycle #8 FOLFOX 6 was given on 5/13/2020, with 50% dose reduction for both 5-FU and oxaliplatin, and also examination of bolus 5-FU.   PET scan examination on 5/21/2020 reported no evidence of metastatic disease in the chest abdomen pelvis.  Stable 6 mm RUL pulmonary nodule.  On 5/27/2020, I discussed with the patient that we can discontinue chemotherapy at this time.   Patient had a surgical procedure for low anterior colon resection, coloanal anastomosis on 8/3/2020.  CT scan for chest abdomen pelvis on 9/9/2020 reported a new 8 mm noncalcified pulmonary nodule in the left lower lobe of the lung.  Otherwise stable right upper lobe 6 mm pulmonary nodule, and no evidence of disease recurrence in the abdomen or pelvis.  PET scan examination on 9/18/2020 reported multiple hypermetabolic small pulmonary nodules/ new pulmonary nodules.   Patient was started on pelvic chemotherapy with FOLFIRI regimen on 10/13/2020.   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 It reported tumor mutation burden 5 Muts/Mb, microsatellite stable, TP53 mutation R282W, and others.   Cycle #5 was without irinotecan, due to peripheral neuropathy.  Hospitalization  with new SVC and left brachiocephalic thrombus which developed while on anticoagulation with Xarelto.  Patient was switched to weight-based Lovenox injection.  Cycle #6 5-FU and irinotecan was delayed by 2 weeks because of the above incident.  Patient had a telemedicine evaluation at that the [facility] in February 2021.  They agreed with our treatment plan for palliative chemotherapy followed by maintenance chemotherapy.    PET scan examination on 4/6/2021 after cycle #12 palliative chemotherapy reported no evidence of hypermetabolic metastatic lesion.   Patient was started on maintenance chemotherapy with 5-FU and Avastin on 4/13/2021. (Unable to tolerate Xeloda because of a significant hand-foot syndrome).   PET scan on 9/24/2021 obtained after cycle #12 maintenance chemotherapy with 5-FU, leucovorin, Avastin reported no evidence for active disease. We recommended 3 months chemotherapy holiday.  CT scan examination on 3/15/2022 reported slightly disease progression with increase in size of small pulmonary nodule.  We started patient back on maintenance chemotherapy on 3/29/2022 treatment with 5-FU plus leucovorin and Avastin, repeating every 2 weeks.  After 6 more maintenance chemotherapy, CT scan for chest abdomen pelvis was done on 6/21/2022 which reported stable sub-6 mm pulmonary nodules.  Patient had last maintenance chemotherapy on 6/7/2022.   Disease progression, patient was restarted back on chemotherapy with 5-FU leucovorin and bevacizumab 8/21/2024    The patient reports she has intermittent rectal bleeding and urgency, mucous with her stool, starting sometime in 2016. At that time she was referred to GI service, and was diagnosed of irritable bowel syndrome. Nevertheless she had worsening urgency for bowel movement, and had a couple of incidents losing control of stool. She was recently seen by Roland Thorpe MD again and had colonoscopy and EGD exam on 07/08/2019. She was  found having a circumferential rectal mass. According to the procedure note, the patient had a fungating circumferential bleeding 4 cm mass in the middle rectum, at distance between 13 cm and 17 cm from the anus. Mass was causing partial obstruction. There were also colon polyps found at the hepatic flexure and also at the rectosigmoid junction 23 cm from the anus. Both were resected and retrieved. EGD examination reported grade A esophagitis at the GE junction and patchy discontinuous erythema and aggravation of the mucosa without bleeding in the stomach body. There is normal mucosa of the duodenum. Pathology evaluation from this procedure reported moderately differentiated adenocarcinoma involving the rectal mass. The rectal sigmoid junction polyp was tubular/tubulovillous adenoma with high grade dysplasia negative for invasive malignancy. The hepatic flexure polyp was also tubular/tubulovillous adenoma negative for high grade dysplasia or malignancy. The biopsy from the esophagus reports squamocolumnar mucosa with inflammatory changes suggestive of mild reflux esophagitis, negative for interstitial metaplasia. Gastric biopsy was benign and duodenal biopsy was also benign. There is no mention of H-pylori.     Patient was subsequently referred to colorectal surgeon Padmaja Ye MD for further evaluation. The patient had CT scan examination for chest, abdomen and pelvis requested by Dr. Ye and were done on 07/13/2019. The study reported no evidence of lymphadenopathy in the abdomen and pelvis. There was wall thickening of the rectosigmoid junction. Normal spleen, pancreas, adrenal glands and kidneys. There was fatty liver infiltration but no focal lymphatic lesions. There was a small 6-7 mm noncalcified nodule in the right upper lobe and a tiny 3 mm subpleural nodule in the right middle lobe. No mediastinal or hilar lymphadenopathy.     Dr. Ye performed staging rectal ultrasound examination. This study  reported tumor penetrating through the muscularis propria as T3 disease; there were no lymph nodes identified.    She had a hospitalization in late September 2019 because of newly discovered pulmonary emboli.  She was on prophylactic Lovenox prior to the incident of PE.  Now she is on full dose Xarelto anticoagulation.  Patient reports no further chest pain dyspnea.  She denies bleeding or bruising.  During her hospitalization, she was seen by Dr. Ye, who plans to have surgery 8 to 12 weeks after finishing radiation therapy.  She finished her radiation on 9/19/2019.     I noticed patient also presented to the emergency room on 10/1/2019 complaining of right flank area pain.  I reviewed the images studies and indeed she has a very small 1 to 2 mm obstructing kidney stone in the UV junction.  Patient is still symptomatic with some pains and dysuria.  She denies fever sweating or chills.    Laboratory study on 10/7/2019 reported normal CBC including hemoglobin 13.1, platelets 301,000, WBC 6170 and ANC 4900 lymphocytes 590 monocytes 480.      The nurse reported malfunction of port-a-catheter on 10/7/2019.  Port study on 10/8/2019 showed fibrin sheath around the distal aspect of the Mediport catheter in the SVC. This does not appear to hinder injection, but does prevent aspiration at this time.    Patient underwent surgical resection of the colon on 12/2/2019 with Dr. Ye.  Pathology evaluation reported residual T3 disease, also 1 out of 14 lymph nodes positive for malignancy.  So this patient in either had at least stage IIIb disease (T3 N1 M0?).      Adjuvant chemotherapy FOLFOX 6 will be started on 1/22/2020.  Laboratory study reported iron saturation 10%, free iron 31 TIBC 319 and ferritin 168.  Her hemoglobin was 11.8, WBC 5600, and platelets 347,000.    Patient was here on 02/12/2020 for cycle 2 of her FOLFOX.  This is delayed x1 week secondary to neutropenia.  The patient ultimately received 3 days worth of  Neupogen with recovery of her blood counts.  Of note, the patient struggled with significant nausea without any episodes of vomiting following cycle #1 of chemotherapy resulting in significant weight loss.  She is up 12 pounds over the last week as her appetite has normalized.  We will add Emend to her care plan.    She is having several loose stools per her colostomy and has been hesitant to take Imodium due to prior history of constipation.  I encouraged her to try this with a maximum of 8 tablets/day.  She denies any infectious symptoms including fevers or chills.  No excess bleeding or bruising noted.  She had the expected cold sensitivity related to the Oxaliplatin for about 3 days following treatment.    Labs from 02/12/2020 demonstrated total white blood cell count of 5.14, ANC of 3.06, hemoglobin of 11.2, platelets of 211,000.  She was found to be iron deficient last week and is intolerant to oral iron secondary to GI distress.  For this reason, she initiated IV iron therapy with Injectafer last week.  She had received her second dose 02/12/2020    Patient has normalized hemoglobin 12.2 on 2/26/2020.    She reported on 5/5/2020 she had a recent visit to the emergency department for what was suspected to be an allergic reaction.  She called our on-call service on Saturday with reports of hand and face swelling.  She was instructed to proceed to the emergency department at which time she was assessed and prescribed a Medrol Dosepak.  She had just completed her 5-FU and was unhooked on Friday, 5/3/2020.  Her symptoms have improved.  She does report persistent hyperpigmentation and mild swelling of the palms of the hands but this is much improved.  It was her right hand specifically that was swollen.  Her facial swelling has resolved.  She continues on Cefdinir nd since has 1 day remaining, she was prescribed cefdinir for a UTI requiring hospitalization at the end of April.  Reports no new symptoms.  Her labs  are stable.  She is scheduled for treatment again.    Patient states at this time she is not tolerating her chemotherapy well.     Patient seen in the emergency department on 5/2/2020 for what was suspected to be an allergic reaction.  She called our on-call service on Saturday explaining that she was experiencing hand and face swelling.  She was instructed to proceed to the emergency department at which time she was assessed and prescribed a Medrol Dosepak.  She had just completed her 5-FU and was unhooked on Friday, 5/1/2020.      She reports since her ED visit on 5/2/2020 her symptoms have not improved. Her hands and feet were swollen upon presenting to the ED and she could barely make a fist. She states that she feels so swollen she is not able to stand it. She states that her skin on the hands and feet are peeling extensively as well, besides changing color to more dark.     She also reports significant fatigue after her first week of the 5-FU treatment but she expected this side effect. She also notices significant increase in her neuropathy to the point that she is not able to even walk around in her home without her house slippers due to irritation from her rugs. She denies and associated nausea or vomiting at this time. She also has episodes of epistaxis every day after the chemotherapy cycle #7.  She does report working full-time during the week of chemotherapy.    Laboratory studies, 5/13/2020, show moderate thrombocytopenia platelets 123,000, low normal WBC 4140 including ANC 2720 and normal hemoglobin 13.4.  Because significant hand-foot syndrome, will decrease both 5-FU and oxaliplatin by 50%, and eliminate bolus dose of 5-FU.    On 5/21/2020 patient had a PET scan performed which showed no convincing evidence of residual disease in abdomen or pelvis, no metastatic disease within the chest or neck.     Cycle #8 FOLFOX 6 was given on 5/13/2020, with 50% dose reduction for both 5-FU and oxaliplatin, and  also examination of bolus 5-FU.  She states on 5/27/2020 that with the recent reduction of the chemotherapy she feels significantly better. She has more energy and is able to do her daily routine.      PET scan examination on 5/21/2020 reported no evidence of metastatic disease in chest abdomen pelvis.  There was a stable 6 mm right upper lobe pulmonary nodule.    Laboratory studies on 5/27/2020 showed mild leukocytopenia WBC 3720 but a normal ANC 2250 and lymphocytes 630.  Normal platelets 163,000 and hemoglobin 12.6.  Chemistry lab reported normal renal function, liver function panel and a electrolytes, elevated glucose 164.    Laboratory studies 6/24/2020, showed normal hemoglobin 13.4 but macrocytosis .9.  Normal platelets 210,000 and WBC 5870.  She had normal CMP.     Patient last time was here in late June 2020.  Since that time she had surgical procedure for low anterior colon resection, coloanal anastomosis on 8/3/2020.  She later developed a perirectal abscess, required surgical drainage on 8/14/2020.  She was discharged on 8/27/2020.    Patient reports to me she still has lower abdominal wall vacuum suction in place.  She denies fever sweating chills.  Performance status is ECOG 1.  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  She does have visiting nurse come to the home for wound care.    CT scan for chest abdomen pelvis on 9/9/2020 reported a new 8 mm noncalcified pulmonary nodule in the left lower lobe.  Otherwise stable right upper lobe 6 mm pulmonary nodule, and no evidence of disease recurrence in the abdomen or pelvis.     Laboratory study on 9/16/2020 reported elevated AST 55, ALT 95, alk phosphatase 143 but normal total bilirubin 0.3.  Chemistry lab otherwise unremarkable.  She has completed normal CBC.      Because of the abnormal CT scan examination for chest abdomen pelvis on 9/9/2020 reported a new 8 mm noncalcified pulmonary nodule in the left lower lobe, we  obtained a PET scan examination for further evaluation, which was done on 9/18/2020.  This study reported several pulmonary nodules, some of them are hypermetabolic, newly developed compared to previous PET scan in May 2020.  Certainly does highly suspicious for metastatic disease.  There are also hypermetabolic activity in the abdomen and pelvic area which is hard to differentiate from surgical scar versus metastatic malignancy.    Laboratory study on 10/13/2020 reported normal CBC with Hb 13.9, platelets 302,000 and WBC 5520 including ANC 3830.  Chemistry lab reported normalized AST 20, alk phosphatase 116 improved ALT 35, and maintains normal bilirubin, with normal electrolytes and liver function panel.     Patient was started on first cycle of palliative chemotherapy FOLFIRI on 10/13/2020.    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  Patient had a new port placed 12/18/2020, and had held her Xarelto for 2 days prior to surgery.  She presented to the ER on 12/21/20 with complaints of facial and arm swelling for 3 days.  She also noted increased shortness of breath.  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  Thrombus within the right internal jugular vein cannot be excluded.  Patient was started on IV heparin, and eventually transitioned to weight-based Lovenox, which she now continues.    PET scan examination on 9/24/2021 reported further shrinking of the tiny right upper lobe pulmonary nodule.  Otherwise no new lesions.  No evidence for metastatic disease in other areas.      Patient had CT scan for chest with IV contrast obtained on 12/14/2021 which reported small tiny stable pulmonary nodules. There is a 4 mm right upper lobe pulmonary nodule. There is also a stable 4 mm left lower lobe pulmonary nodule. There is  also stable left upper lobe micronodule.     Laboratory studies today on 12/21/2021 reported normal hemoglobin 15.9 however .0, platelets 218,000 WBC 5360 including neutrophils 3730 lymphocytes 980. Chemistry lab reported normal renal function, electrolytes, glucose, and marginally elevated ALT 55, AST 34 but normal total bilirubin and alk phosphatase.     CT scan for chest abdomen pelvis 6/21/2022 reported sub-6 mm pulmonary nodules either stable or slightly smaller.  No new pulmonary nodules or evidence for disease progression.  There is no evidence for metastatic disease in the liver however it shows diffuse hepatic steatosis.  There was masslike thickening in the presacral space with the clips or calcification approximately 1.7 cm in greatest AP dimension but stable.    Laboratory study on 9/20/2022 reported normal hemoglobin 15.7 with mild macrocytosis .1.  She has normal CBC and platelets.  She also has unremarkable CMP.  Normal CEA 1.13 ng/mL.    Patient was seen by Dr. Ye, who performed ileostomy takedown on 11/14/2022.  Patient reports that she is recovering.  She still has a small open wound less than 1 cm in diameter, however close to 2 cm deep.  She has been changing dressing herself.  She denies fever sweating chills.    Patient has no other specific complaints.  She has excellent performance status ECOG 0.  She denies chest pain dyspnea cough hemoptysis.  No abdominal pain.  No melena hematochezia.  Patient has been eating well, stable weight.  She works full-time.    She continues Lovenox injections and denies any significant bleeding issues.   No other new problems or concerns.     CT scan for chest abdomen pelvis obtained on 1/10/2023 reported stable small pulmonary nodules, no evidence for active or new disease.    Laboratory study on 1/10/2023 reported normal CBC and normal CMP.  CEA level is 0.99 ng/mL.    CT scan for chest, abdomen, and pelvis on 7/7/2023 reported stable small  pulmonary nodules including a left upper lobe 5 mm nodule. There were no new masses, or pleural effusion. No enlarged supraclavicular, axillary, mediastinal, or hilar lymphadenopathy. Mediastinal vasculature normal. There is occlusion of the superior vena around the catheter with body wall  is present. There was no evidence for disease recurrence in the abdomen or pelvis. Bone is also negative for metastatic disease.    Laboratory studies obtained on 07/07/2023 also reported normal CBC, and normal CMP as well as low level CEA 1.07 ng/mL.    The CT scan for the chest on 10/27/2023 reported enlarging pulmonary nodules bilaterally. The right upper lobe lung nodule increased from 7 x 7 mm to 9 x 8 mm, and the left upper lobe nodule measured 8 x 9 mm from 5 x 6 mm in 04/2023. There is a different left upper lobe nodule 6 x 8 mm from previous 5 x 5 mm. The presacral tissue thickness measures up to 2.3 cm.    A laboratory study on 10/27/2023 reported CEA 1.58 ng/mL, normal CBC, and CMP.  Molecular study of peripheral blood Guardant Reveal is still pending.    The patient presents today on 11/17/2023 for reevaluation to discuss the results of PET scan examination as well as the results of tumor conference. I saw her recently on 11/03/2023 and because of enlarging pulmonary nodules on the CT scan, we requested a PET scan examination. I presented her case yesterday on 11/16/2023 at the [facility].    The patient reports she is at her baseline condition as 2 weeks ago. No specific complaints, no chest pain, dyspnea, cough, etc. She has a regular bowel movement.    Her case was present at the [facility]. on 11/16/2023. The PET scan images and chest CT scan were reviewed in conjunction with her treatment history. The consensus was for patient to receive stereotactic radiation therapy for the right upper lobe lung lesion, and the bigger left upper lobe lesion, and monitor the  smaller left upper lobe lesion with further images studies down the line. Also, the conference recommended Guardant Reveal study which we already ordered.     This Guardant Reveal study came back today as negative for ctDNA 0%.      CT of the chest on 2/2/2024 reported shrinking of the right upper lobe lesion from 9 mm to 6 mm, and the left upper lobe lesion also decreased from 9 mm to 6 mm. Otherwise, there are a couple of stable pulmonary nodules, 7 to 8 mm. The right hilar has a small lymph node that has increased from 6 mm to 8 mm and is otherwise stable. There was no suspicious lesion in the abdomen or pelvis.    Colonoscopy examination 9/11/2024 showed patchy mild inflammation characterized by congestion/edema in the descending colon. Evidence of previous endo coloanal anastomosis in the rectum. Presence of hemorrhoids.      MEDICATIONS    Current Outpatient Medications:     bismuth subsalicylate (PEPTO BISMOL) 262 MG/15ML suspension, Take 15 mL by mouth 4 (Four) Times a Day., Disp: , Rfl:     clonazePAM (KlonoPIN) 1 MG tablet, Take 1 tablet as needed daily for anxiety. May take one additional as needed for severe anxiety., Disp: 45 tablet, Rfl: 3    Cyanocobalamin (VITAMIN B-12 PO), Take 1 tablet by mouth 3 (Three) Times a Week. M-W-F, Disp: , Rfl:     dicyclomine (BENTYL) 20 MG tablet, TAKE 1 TABLET BY MOUTH EVERY 6 (SIX) HOURS AS NEEDED FOR IRRITABLE BOWEL SYMPTOMS, Disp: 360 tablet, Rfl: 2    diphenoxylate-atropine (LOMOTIL) 2.5-0.025 MG per tablet, TAKE 2 TABLETS BY MOUTH 4 TIMES A DAY, Disp: 240 tablet, Rfl: 11    Enoxaparin Sodium (LOVENOX) 100 MG/ML solution prefilled syringe syringe, INJECT 1 ML UNDER THE SKIN INTO THE APPROPRIATE AREA AS DIRECTED EVERY 12 (TWELVE) HOURS., Disp: 60 mL, Rfl: 2    escitalopram (LEXAPRO) 10 MG tablet, Take 1 tablet by mouth Daily., Disp: 90 tablet, Rfl: 1    famotidine (PEPCID) 20 MG tablet, TAKE 1 TABLET BY MOUTH TWICE A DAY, Disp: 180 tablet, Rfl: 2     "HYDROcodone-acetaminophen (NORCO) 5-325 MG per tablet, Take 2 tablets by mouth Every 6 (Six) Hours As Needed for Moderate Pain., Disp: 90 tablet, Rfl: 0    hydrocortisone 2.5 % cream, APPLY RECTALLY 3 TIMES DAILY. INCLUDE APPLICATOR., Disp: , Rfl:     Hydrocortisone, Perianal, (ANUSOL-HC) 2.5 % rectal cream, Apply rectally 3 times daily.  Include applicator., Disp: 30 g, Rfl: 1    Loperamide HCl (IMODIUM PO), Take  by mouth As Needed., Disp: , Rfl:     Loratadine 10 MG capsule, Take 1 capsule by mouth Every Evening., Disp: , Rfl:     metoprolol succinate XL (TOPROL-XL) 25 MG 24 hr tablet, TAKE 0.5 TABLETS BY MOUTH EVERY NIGHT, Disp: 45 tablet, Rfl: 2    nystatin (MYCOSTATIN) 927212 UNIT/GM cream, Apply 1 Application topically to the appropriate area as directed 2 (Two) Times a Day., Disp: 30 g, Rfl: 2    nystatin-zinc oxide-hydrocortisone-bacitracin, Apply  topically to the appropriate area as directed Daily As Needed (As needed)., Disp: 60 g, Rfl: 2    octreotide (sandoSTATIN) 100 MCG/ML injection, Inject 1 mL under the skin into the appropriate area as directed 3 (Three) Times a Day., Disp: 90 mL, Rfl: 2    ondansetron (ZOFRAN) 8 MG tablet, TAKE 1 TABLET BY MOUTH THREE TIMES A DAY AS NEEDED FOR NAUSEA AND VOMITING, Disp: 60 tablet, Rfl: 1    pantoprazole (Protonix) 40 MG EC tablet, Take 1 tablet by mouth Daily., Disp: 90 tablet, Rfl: 1    rosuvastatin (CRESTOR) 5 MG tablet, Take 1 tablet by mouth Daily., Disp: 90 tablet, Rfl: 0    Syringe/Needle, Disp, 27G X 1/2\" 1 ML misc, Use as directed for octreotide injections, Disp: 100 each, Rfl: 3  No current facility-administered medications for this visit.    Facility-Administered Medications Ordered in Other Visits:     bevacizumab (AVASTIN) 270 mg in sodium chloride 0.9 % 110.8 mL chemo IVPB, 3 mg/kg (Treatment Plan Recorded), Intravenous, Once, Patience Lorenzo APRN    dexAMETHasone (DECADRON) IVPB 12 mg, 12 mg, Intravenous, Once, Patience Lorenzo, " "APRN    fluorouracil (ADRUCIL) 2,880 mg in sodium chloride 0.9 % 240 mL chemo infusion - FOR HOME USE, 1,440 mg/m2 (Treatment Plan Recorded), Intravenous, Once, Oeswein, Patience Angela, APRN    leucovorin 480 mg in sodium chloride 0.9 % 274 mL IVPB, 240 mg/m2 (Treatment Plan Recorded), Intravenous, Once, Oeswein, Patience Angela, APRN    palonosetron (ALOXI) injection 0.25 mg, 0.25 mg, Intravenous, Once, Oeswein, Patience Angela, APRN    sodium chloride 0.9 % infusion, 20 mL/hr, Intravenous, Once, Oeswein, Patience Angela, APRN    ALLERGIES:   No Known Allergies    SOCIAL HISTORY:       Social History     Tobacco Use    Smoking status: Every Day     Current packs/day: 1.00     Average packs/day: 1 pack/day for 25.0 years (25.0 ttl pk-yrs)     Types: Cigarettes     Passive exposure: Current    Smokeless tobacco: Never    Tobacco comments:     1 PPD/caffeine use    Vaping Use    Vaping status: Some Days    Substances: Nicotine    Devices: Disposable    Passive vaping exposure: Yes   Substance Use Topics    Alcohol use: Not Currently     Comment: RARELY    Drug use: Never         FAMILY HISTORY:   Mother has positive factor V Leiden mutation, although she did not have thrombosis, mother also is coronary disease with stenting, she also is occasional bruising.    Maternal grandmother had DVT, she had multiple surgical procedures.    Patient mother's half-brother had metastatic colon cancer at diagnosis in his 50s.    Maternal great aunt had breast cancer.           Vitals:    03/19/25 0824   BP: 104/72   Pulse: 85   Resp: 16   Temp: 98 °F (36.7 °C)   TempSrc: Oral   SpO2: 98%   Weight: 81.6 kg (180 lb)   Height: 165.1 cm (65\")   PainSc: 0-No pain           3/19/2025     8:23 AM   Current Status   ECOG score 0     Physical Exam    GENERAL:  Well-developed, well-nourished female in no acute distress.    SKIN:  Warm, dry without rashes, purpura or petechiae.  HEENT:  Normocephalic.   LYMPHATICS:  No cervical, supraclavicular " or axillary adenopathy.  CHEST: Normal respiratory effort.  Lungs clear to auscultation. Good airflow.  CARDIAC:  Regular rate and rhythm. Normal S1,S2.  ABDOMEN:  Soft, no tender.  Bowel sounds normal.  EXTREMITIES:  No lower extremity edema.    RECENT LABS:  Results from last 7 days   Lab Units 03/19/25  0804   WBC 10*3/mm3 5.85   NEUTROS ABS 10*3/mm3 3.81   HEMOGLOBIN g/dL 13.7   HEMATOCRIT % 43.4   PLATELETS 10*3/mm3 159       Results from last 7 days   Lab Units 03/19/25  0804   SODIUM mmol/L 139   POTASSIUM mmol/L 3.8   CHLORIDE mmol/L 106   CO2 mmol/L 25.0   BUN mg/dL 6   CREATININE mg/dL 0.49*   CALCIUM mg/dL 8.6   ALBUMIN g/dL 3.7   BILIRUBIN mg/dL 0.2   ALK PHOS U/L 92   ALT (SGPT) U/L 14   AST (SGOT) U/L 14   GLUCOSE mg/dL 103*   MAGNESIUM mg/dL 2.2         IMAGING:  CT Chest With Contrast Diagnostic, CT Abdomen Pelvis With Contrast  Narrative: CT CHEST W CONTRAST DIAGNOSTIC-, CT ABDOMEN PELVIS W CONTRAST-     HISTORY: Rectal cancer follow-up.     TECHNIQUE: CT of the chest, abdomen and pelvis was performed following  administration of intravenous contrast. Reformatted and MIP images were  reviewed. Oral contrast partially opacifies the bowel. Radiation dose  reduction techniques were utilized, including automated exposure control  and exposure modulation based on body size.     COMPARISON:  PET/CT 11/12/2024. CT chest 11/2/2024.     FINDINGS CHEST:       There is a right chest port. The SVC is diminutive. There are numerous  collateral vessels in the anterior chest wall, right greater than left.  Findings suggest chronic central venous occlusion.  Heart size is normal. There is no pericardial effusion. There is  incompletely assessed calcific coronary artery atherosclerosis. There is  trivial pleural fluid on the left.  There is degenerative disc disease. There is osseous demineralization.  There are old rib fractures.  The trachea is clear. There is mild bilateral curvilinear atelectasis  and or  scarring. Previously noted groundglass opacity in the left lung  apex has resolved. There is no focal consolidation. A 0.5 cm nodule in  the anterior right upper lobe is slightly smaller compared to remote  prior examinations. A 0.6 cm nodule in the superior segment of the left  lower lobe is similar to prior but new from remote prior examinations. A  0.6 cm nodule in the left upper lobe is similar to prior but also new  from remote prior examinations. A small nodule in the left upper lobe is  also not significantly changed (image 40).     FINDINGS ABDOMEN/PELVIS:     The liver is normal in size. No suspicious focal intrahepatic lesion is  identified. The gallbladder is surgically absent. The spleen is normal  in size. The pancreas is within normal limits. The adrenal glands are  within normal limits. The kidneys are within normal limits.  No pathologically enlarged abdominal or pelvic lymph nodes are  identified. There is mild calcific atherosclerosis. There is a  circumaortic left renal vein, a developmental variant.  There is an enteroenteric anastomosis in the right lower quadrant. There  are no dilated small bowel loops. Presacral soft tissue thickening is  not significantly changed. The bladder is mildly distended. The uterus  is present. There is no adnexal mass.  Numerous subcutaneous nodules and soft tissue gas overlying the  bilateral anterior lower quadrants are again demonstrated, likely  sequela of injections. Collateral vessels are noted. There is osseous  demineralization. There is degenerative disc disease. There is  transitional lumbosacral anatomy. Sclerotic osseous lesions are similar  to prior.           Impression: COMBINED IMPRESSION:     1.  Scattered bilateral pulmonary nodules measuring up to 0.6 cm, some  of which are similar to prior but new from more remote prior  examinations.  2.  Presacral soft tissue thickening, similar to prior.  3.  Chronic central venous occlusion.  4.  Additional  chronic findings, as above.           This report was finalized on 2/4/2025 12:25 PM by Dr. Irene Dickey M.D  on Workstation: [id number]       Assessment & Plan  1. Chemotherapy follow-up.  Her white blood cell count is within normal limits at 6510, hemoglobin is satisfactory at 14.01, and platelet count is also normal at 203,000. Liver function tests are unremarkable, although there is a slight decrease in albumin levels, likely due to suboptimal nutritional status. Glucose levels are well-controlled, kidney function is normal, and BUN is marginally low, which is not concerning. Electrolyte balance is generally good, with a minor exception of slightly low calcium, which would be corrected to about 9. She has been on the same dose of chemotherapy since December 2024. The recent exacerbation of diarrhea is not believed to be related to the current treatment regimen. Chemotherapy will be resumed with 5-FU at a reduced dose of 1440 mg/m² and bevacizumab at the same dose of 4 mg/kg. She will return in 2 days to discontinue the 5-FU infusion. Subsequently, she will return every 2 weeks for laboratory studies and consultations with JULIET/MD for chemotherapy. She will continue octreotide 100 mcg every 8 hours for chemotherapy-induced diarrhea, along with Imodium and Lomotil as needed. Lovenox subcutaneously every 12 hours for anticoagulation will also be continued, along with the rest of her medications.    2. Diarrhea.  She reports that her diarrhea has improved, with no issues for about a week and a half, except for a minor episode last night. She will continue octreotide 100 mcg every 8 hours for chemotherapy-induced diarrhea, along with Imodium and Lomotil as needed.    3. Fatigue.  She reports feeling tired lately, likely due to interrupted sleep from broken ribs.    4. Weight gain.  She has gained 3 pounds recently, reversing her previous weight loss trend.    Follow-up  The patient will follow up in 4 weeks for  reevaluation prior to chemotherapy with routine labs, CBC, and CMP.    ASSESSMENT:   1.  Metastatic rectal cancer.   Initial rectal ultrasound examination reported T3N0 disease without lymphadenopathy.   CT scan of chest, abdomen and pelvis reported no lymphadenopathy in the abdomen, pelvis or chest. She does have small pulmonary nodule 6-7 mm and 2 mm with indeterminate feature. There is no solid evidence of metastatic disease otherwise.   Based on the CT scan and rectal ultrasound imaging studies, this patient had stage IIA (T3N0M0) disease.   She had PET scan examination on 8/8/2019 which reported a hypermetabolic right upper lobe pulmonary nodule 6 mm with SUV 5.6.  This is suspicious for metastatic disease however it is too small to be biopsied.  This patient may have stage IV disease.   She initiated concurrent radiation with continuous 5-FU on 8/12/2019.  Patient finished concurrent chemoradiation therapy.  Patient underwent surgical resection of the rectal tumor and diverting loop ileostomy on 12/2/2019 with Dr. Ye.  Pathology evaluation reported residual T3 disease, with 1 out of 14 lymph nodes positive for malignancy.  Certainly this patient has at least stage IIIb rectal cancer (T3N1M0?)  On 1/22/2020 adjuvant chemotherapy FOLFOX 6 regimen initiated.   On 2/5/2022 cycle #2 was delayed secondary to neutropenia.  She was given 3 days of Granix.   Emend added with cycle 3.  With improved nausea control  Continuing home Granix x3 days following 5-FU disconnect  3/11/2020 due for cycle 4, however, she is experiencing progressive abdominal pain and occasional fevers.   CT scan performed on 3/13/2020 reveals no evidence of progressive or recurrent disease.  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  Patient hospitalized 4/24-4/26/20 after cycle 5 of chemotherapy with acute UTI.  CT abdomen/pelvis noting fluid collection in the presacral region having diminished in size compared to CT dated 3/13/2020.  Patient  was evaluated by Dr. Ye while in the hospital with further plans to evaluate possible colovaginal fistula following completion of chemotherapy.  Patient did respond to IV antibiotics and discharged home on oral cefdinir.  4/29/2020 cycle 6 of FOLFOX.  Urinary symptoms have resolved   Patient seen in the [facility] ED on 5/2/2020 for what was suspected to be an allergic reaction.  She called our service on Saturday explaining that she was experiencing hand and face swelling.  She was instructed to proceed to the emergency department at which time she was assessed and prescribed a Medrol Dosepak.  She had just completed her 5-FU and was unhooked on Friday, 5/1/2020.  Her symptoms have resolved in the office on assessment, 5/5/2020.  The patient had grade 3 hand-foot syndrome based on symptomology.  Patient had cycle #8 of 5-FU on 5/13/2020. Due to her symptoms and poor tolerance to the 5-FU, her treatment dose will be reduced 50% for oxaliplatin and infusional 5-FU.  Bolus 5-FU will be discontinued..  On 5/21/2020 patient had a PET scan and it showed no evidence of of metastatic disease in the neck, chest, abdomen or pelvis.  There was fluid accumulation/abscess.   On 5/27/2020 discussed with the patient that we can discontinue chemotherapy at this time.  She will follow-up with Dr. Ye to discuss a possibility to reverse the ileostomy.  We likely will obtain anther PET scan in 3 to 4 months for reassessment.    Patient was seen by Dr. Ye on 6/19/2019 for evaluation and to discuss possible take down of her ileostomy.  She is scheduled to have a gastrografin enema on 7/2/2020 to evaluate for a fistula, and then a colonoscopy on 7/15/2020, both done by Dr. Ye.  She states that based on the above imaging and procedure findings, she may have a more extensive surgery done or just have her ileostomy reversed, which would likely be done in August 2020.  This will all be coordinated under the care of Dr. Ye.      CT scan from 09/09/2020 and also compared to her previous PET scan examination from 05/21/2020 and also the original PET scan from 08/09/2019.  The original PET scan there is a small right upper lobe pulmonary nodule 6 mm with SUV 5.6. So in the 05/2020 PET scan the nodule is still there but activity seems much less and this most recent CT scan examination also documented the preexisting small pulmonary nodule however there is a new left lower lobe pulmonary nodule 8 mm in size and I shared with the patient today this nodule is suspicious for metastatic disease. Laboratory studies reported minimal CEA level 1.32 on 09/09/2020. Liver function panel was only marginally abnormal with the ALT 45, the remaining is normal. However laboratory study today showed worsening AST 55, ALT 95 and alkaline phosphatase 143 but is still normal, total bilirubin 0.3.   Recommended to have repeat PET scan examination for assessment because the left lower lobe pulmonary nodule is too small to be biopsied, and if the PET scan reports hypermetabolic lesion, I recommended to have stereotactic radiation therapy. Explained to patient that she is not a really good candidate to have thoracotomy for resection because she still has unhealed wound in the abdomen. Stereotactic radiation therapy will likely be a more feasible choice.   PET scan examination obtained on 9/18/2020 showed metastatic disease.  It reported a few hypermetabolic pulmonary nodules, and some new small pulmonary nodules, all of them highly suspicious for metastatic disease.  She also has hypermetabolic activity in the abdomen and pelvic area which could be related to scar tissue from her recent surgery versus metastatic disease.  Nevertheless overall picture fits with metastatic cancer.  Discussed with the patient on 9/20/2020, we reviewed the PET scan images together, and I recommended to have systematic chemotherapy, I do not think stereotactic reading therapy to the  hypermetabolic lesions in the lungs warranted at this time because even those will be treated with reading therapy, she will still need systematic chemotherapy.  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  I did discuss with the patient using anti-EGFR monoclonal antibody versus anti-VEGF monoclonal antibody.     Palliative chemotherapy cycle#1 FOLFIRI was started on 10/13/2020.  No bolus 5-FU given considering previous poor tolerance.    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  Microsatellite stable, mutation burden 5/Mb.   Discussed with the patient 10/27/2020, because of the K-s[place]ia mutation, she is not a candidate for anti-EGFR monoclonal antibody such as Erbitux or vectibix.  She could be a candidate for anti-VEGF monoclonal antibody, however because of active wound, she is not a candidate right now at this moment.  Patient seen in the ED for acute right neck pain on 11/16/2020.  CT angiogram as well as CT of the cervical spine performed with no acute findings but notably one of her pulmonary nodules, left upper lobe, somewhat decreased in size.  Patient given prednisone pack.  Further details below.  Cycle #6 chemotherapy will be resumed on 1/5/2021.  Started back on original irinotecan.  PET scan examination obtained on 1/12/2021 after cycle #6 chemotherapy reported improved pulmonary nodule hypermetabolic activity.  Confirmed these are metastatic lesions.  Patient is evaluated 1/19/2020, will continue cycle #7 FOLFIRI chemotherapy.  However Avastin will be on hold see next section.   Patient was reevaluated on 2/2/2021, will continue chemotherapy cycle #8 FOLFIRI.  Avastin / biosimilar will be added.    She talked to [facility] specialist in mid February 2021, and had recommended palliative chemotherapy without surgical intervention of metastatic lung lesions.   On 3/2/2021, patient will proceed with cycle #10  FOLFIRI plus bevacizumab.  After 12 cycles, plan to start maintenance treatment.  3/16/2021 cycle 11.  Plus bevacizumab.  3/30/2021 cycle 12 FOLFIRI plus bevacizumab.  Having issues with worsening nausea despite premeds with dexamethasone, Aloxi, Emend, and Zofran at home.  Patient is requesting a dose of Phenergan, which is helped her in the past.  We will give this to her with treatment today.  PET scan examination on 4/6/2021 reported no evidence of hypermetabolic metastatic lesion.  Discussed with the patient on 4/13/2021, we reviewed the PET scan results.  We recommended to switch to maintenance chemotherapy without irinotecan.  We will continue 5-FU and Avastin every 2 weeks.  We discussed possibility of switching to oral Xeloda treatment.  Discussed with the patient for side effects more so with hand-foot syndrome.  Patient is agreeable.   Xeloda 2000 mg twice daily 7 days on, 7 days off along with Avastin initiated 4/27/2021.  After only 5 doses of Xeloda significant hand-foot syndrome, Xeloda held. Patient requesting to be transitioned back to infusional 5-FU, as she felt that she tolerated infusional 5-FU without any problem.  The patient will receive 5-FU leucovorin and Avastin every 2 weeks.  Xeloda discontinued.  5/25/2021 continued cycle #4 maintenance 5-FU, leucovorin, Avastin tolerating this much better so far, we will continue this. Recommended to have 12 cycles of maintenance chemotherapy, then obtain PET scan for reevaluation.  We could discuss further treatment plan at that time.  9/14/2021 patient due for cycle 12 of additional maintenance 5-FU/leucovorin plus Avastin.  She continues to tolerate treatment well overall.  She is anxious in the office today with her Mediport not getting blood at first and also with upcoming scans being heavy on her mind.  She was instructed to take one of her Klonopin pills while here to help calm her mind.  Heart rate did improve from 140s down to 112.  Proceed  with treatment today as scheduled.  PET scan examination on 9/24/2021 reported further shrinking of the right upper lobe tiny pulmonary nodule.  No other new lesions.  Discussed with patient on 9/24/2021 about further shrinking of the right upper lobe pulmonary nodule and no evidence for other new lesions. We recommended to have chemo break for 3 months with repeated CT scan for reevaluation.  Recent CT scan for chest 12/14/2021 and abdomen CT from 11/29/2021 reported no evidence for active disease. The right upper lobe pulmonary nodule, left lower lobe pulmonary nodule minimal about 4 mm and stable. I discussed with patient today on 12/21/2021, recommended to give her another 3 months chemotherapy holiday and obtain CT scan afterwards for reevaluation. After discussion, patient is agreeable.   CT scan examination for chest abdomen and pelvis obtained on 3/15/2022 reported a slight increase of the left upper lobe pulmonary nodule 5 mm, from previous 3 mm.  The other pulmonary nodules were stable in size.  There is also small left upper lobe groundglass changes.   Dr. Nguyen reviewed images studies with the patient 3/23/2022, compared to multiple previous images including CT chest CT and PET scan, suspected disease progression.  Discussed with the patient, and I recommended to resume maintenance chemotherapy with 5-FU plus leucovorin plus Avastin repeating every 2 weeks, and obtaining CT scan for reassessment in 3 months.  Patient is agreeable.  Patient resumed maintenance chemotherapy on 3/29/2022 with 5-FU leucovorin and Avastin.   4/26/2022, proceed with cycle #27 maintenance 5-FU, leucovorin, and Avastin.  Patient continues to tolerate treatment well.    On 5/10/2022, we will proceed ahead with cycle #28 maintenance chemotherapy.  Plan to have CT scan after cycle #30.  5/24/2022 cycle 29 maintenance chemotherapy.  Continue to tolerate well.  6/7/2022 cycle #30 maintenance chemotherapy, continuing to tolerate well.    CT scan for chest abdomen pelvis with IV contrast obtained on 6/21/2022 reported sub-6 mm pulmonary nodules either stable or slightly smaller.  We reviewed the images with the patient together.  We discussed with the patient and recommended to hold chemotherapy for now with repeating CT scan in 3 months for reassessment.  CT scan of the chest abdomen pelvis 9/13/2022 reported stable condition, no evidence for recurrent or metastatic colon cancer.  On 1/10/2023 patient had a CT chest abdomen pelvis with reported no evidence for disease progression.  Laboratory study also showed normal CEA.   Patient had a CT scan for chest, abdomen, and pelvis obtained on 04/11/2023. This study reported very small pulmonary nodules, the right upper lobe nodule is stable to 6 mm, however, the left upper lobe and the left lower lobe nodule increased to 5 mm from previous 4 mm. I discussed with the patient today on 04/19/2023, I recommend to obtain another CT scan in 3 months for reassessment. The nodules are so small, they likely will not be picked up by PET scan examination.  CT scan examination of the chest, abdomen, and pelvis obtained on 7/7/2023 reported stable small pulmonary nodules. No evidence for disease progression or new lesions in chest, abdomen, and pelvis.  Discussed with patient today on 7/14/2023, however, patient is concerning for disease progression. I recommended to obtain Guardant Reveal for circulating tumor DNA study. If the study is positive, we will further obtain PET scan examination, otherwise, we will obtain CT scan for chest, abdomen, and pelvis in 3 months for reassessment.  The patient had CT scan for the chest, abdomen, and pelvis obtained on 10/27/2023, which reported worsening pulmonary nodules at bilateral upper lobes. Laboratory studies showed low normal CEA level and normal liver function panel. The Guardant Reveal study is still pending. I discussed this with the patient on 11/03/2023 and we  shared the images, and I recommended having a PET scan examination for further assessment. I will present her case at the multimodality lung conference. If those lesions are deemed to be metastatic lesions, I recommend having stereotactic radiotherapy. I discussed it with the patient, and she voiced understanding and was agreeable with that strategy.  I presented her case at the multimodality chest conference 11/16/2023.  The consensus was for patient to receive stereotactic radiation therapy for the right upper lobe lung lesion, and the bigger left upper lobe lesion, and monitor the smaller left upper lobe lesion with further images studies down the line. Also, the conference recommended Guardant Reveal study which we already ordered. I discussed with the patient today on 11/17/2023, we explained to the patient that the opinion of the conference and she is agreeable with this and prefers this strategy because of limited toxicity compared to long term commitment to starting chemotherapy again. I recommend to obtain a CT scan for the chest, abdomen, and pelvis with both IV and oral contrast for reassessment in 3 months for reassessment of metastatic lung lesions and thickness in the pelvis where the PET scan reported a low activity SUV 2.4 in the surgical bed.   The patient had steroid-induced radiation therapy for the right upper lobe and one of the left upper lobe lesions.  Her CT scan examination on 02/02/2024 reported shrinking nodules of the right upper lobe and one of the left upper lobe lesions, both from 9 mm down to 6 mm. There are 2 stable pulmonary nodules 7 mm. Nothing new.  02/09/2024, I discussed with the patient and recommended CT scan for the chest, abdomen, and pelvis in 3 months for reassessment. Guardant Reveal study was 0% ctDNA. We will also continue laboratory studies every 3 months for Guardant Reveal, together with routine labs, CBC, CMP, and CEA.  CT scan of the chest, abdomen, and pelvis  conducted on 4/23/2024 revealed stable multiple pulmonary nodules, with no other new pulmonary nodules or evidence of disease recurrence or abnormal pelvis. The patient's liver function panel was normal, and low-level CEA level was also noted. The Guardant Reveal study was able to be obtained earlier due to regulatory rules, and we hugo obtain today the Guardant reveal study, be available within 10 to 14 days.   Given the patient's metastatic liver lesion, and lung lesions from colon cancer, careful monitoring is necessary. A chest CT scan with IV contrast will be arranged in 3 months for reevaluation. The study will be reviewed again in 3 months, which will include CBC, CMP, and CEA level.   Imaging study with chest CT scan on 08/02/2024 reported multiple bilateral pulmonary nodules that are relatively stable. However, the Guardant Reveal reported positive ctDNA indicating disease progression. A subsequent PET scan examination on 08/14/2024 reported newly developed hypermetabolic pulmonary nodules. The highest SUV is 3.7, corresponding to an 8 mm new right upper lobe nodule, and there are several other hypometabolic nodules in the lungs.   8/21/2024 resumption of chemotherapy with 5-FU bevacizumab  Triage visit 8/28/2024 with intractable diarrhea that was unclear if this is secondary to chemotherapy or infectious etiology given her known chronic colitis, fever and abdominal pain.  Further management as outlined below  9/4/2024 per review today diarrhea has improved though she is having some difficulty with passage of gas and stool,?  Related to significant inflammation in the rectum versus tumor obstruction.  The passage of gas has improved in the last few days but she does still have to change positions on the toilet to get stool to pass without it being painful.  Discussed all of this with Dr. Nguyen and we will refer the patient urgently back to Dr. Ye for at the very least rectal examination in the office  versus full repeated colonoscopy.  Because of the potential for examination or invasive procedures, we will hold bevacizumab today.  Because the patient had significant diarrhea last week and concerns that it may be related to chemotherapy we will decrease her 5-FU/leucovorin today by 30%.  If she does well we can go back to full dose with cycle 3.     9/18/2024 she presented for evaluation prior to cycle #3 of palliative chemotherapy with 5-FU, leucovorin, and bevacizumab. Given her recent biopsy on 09/11/2024, it is prudent to postpone the Avastin treatment today to ensure safety.  Chemotherapy will be proceeded.      She presented for evaluation on 10/02/2024, tolerating chemotherapy except for diarrhea. This has been managed with octreotide. Proceed with cycle 4 chemotherapy today with 5-FU, leucovorin and resumption of bevacizumab.   Reevaluation 10/16/2024 due for cycle 5 5-FU, leucovorin, bevacizumab.  Due to ongoing significant diarrhea, bevacizumab will be placed on hold for potential surgical intervention.  Additionally, 5-FU will be dose reduced to 50%.  Additional adjustments as outlined below  Patient reviewed 10/30/2024 with improved tolerance to cycle five 5-FU, leucovorin.  We we will attempt to slightly escalate 5-FU dosing, increasing by 10% (40% dose reduction)  Patient did experience leakage of 5-FU, and return 10/31/2024 for evaluation of this.  She was sent for port dye study that showed correct location of her port, so 5-FU was resumed.  The patient's right rib pain is likely due to a new chair on the bone, as indicated by the PET scan. The PET scan revealed a new 2.8 x 1.3 cm groundglass opacity in the lateral aspect of the left apex with SUV 6.3. This appearance is nonspecific and may represent an infectious or inflammatory infiltrate. Continued monitoring and follow-up imaging are recommended.  The current regimen of 5-FU will be maintained, and Avastin will be reintroduced with full dose.  11/13/2024 will proceed ahead with 5-FU, leucovorin and bevacizumab.   12/4/2024 She also reports two bad days of diarrhea, likely due to Avastin. The dosage of Avastin will be reduced to 4 mg/kg. The 5-FU dosage will be increased to 1680 mg/m².   12/18/2024 proceed with 5-FU and Avastin continuing previous dose reductions as she had excellent tolerance to treatment 2 weeks ago.   12/31/2024 patient reports tolerating chemotherapy with the same dose adjustment.  This is a 1 day early because of the closure of the New Year's day holiday tomorrow.  Next cycle will be resumed in 15 days back to her usual schedule.  Will plan to have CT scan for chest abdomen pelvis with IV contrast in early February 2024.  1/29/2025 proceed with 5-FU and bevacizumab maintaining previous dosing.  CT scan for chest abdomen pelvis on 1/31/2025 reported stable small pulmonary nodules.  No evidence for disease progression.  Due to profound diarrhea from chemotherapy, we decided to postpone next chemotherapy to 3/5/2025.    3/5/2025 patient has improved diarrhea. Chemotherapy will be resumed with reduction of 5-FU at 1440 mg/m² and bevacizumab at the same dose of 4 mg/kg. She will return in 2 days to discontinue the 5-FU infusion. Subsequently, she will return every 2 weeks for laboratory studies and consultations with JULIET/MD for chemotherapy. She will continue octreotide 100 mcg every 8 hours for chemotherapy-induced diarrhea, along with Imodium and Lomotil as needed. Lovenox subcutaneously every 12 hours for anticoagulation will also be continued, along with the rest of her medications.  3/19/2025 proceed with chemotherapy at previous dosing.  She tolerated most recent cycle of chemotherapy very well    2.  Intractable diarrhea due to chemotherapy.   Patient developed diarrhea on Saturday, 8/24/2024.  Is unclear if this is related to infection as she did have fevers at the time the diarrhea began or chemotherapy.  She does have chronic  colitis noted on most recent PET scan, this therefore could be diverticulitis flare  GI panel and C. difficile negative  8/29/2024 she did receive a single dose of octreotide  Begin empiric Flagyl 500 mg 3 times daily x 10 days  8/30/2024 discussed negative stool studies.  We will add Levaquin to already prescribed Flagyl to complete management of diverticulitis.  It is unclear if the patient's symptoms are related to diverticular flare given her previous abdominal pain and fever versus chemotherapy.  However, her symptoms are currently improved  9/4/2024 diarrhea has resolved.  Stools are now more soft with some form but she is having difficulty with passage of stool, having to change positions on the toilet to avoid pain.  We are referring back to Dr. Ye as detailed above.  She will finish out the Flagyl and Levaquin as prescribed having roughly 3 days left of both.  We will also adjust doses of 5-FU/leucovorin today with concern for potential chemotherapy-induced diarrhea.  If she does well this cycle we can increase back to full dose with cycle 3 per Dr. Nguyen.  On 9/18/2024 patient reports that she experienced significant diarrhea during cycle #2 chemotherapy on 09/04/2024, leading to a 30% dose reduction. Despite taking Lomotil 2 tablets four times a day, Imodium, and Pepto-Bismol, her diarrhea persisted. She received octreotide shots twice, which improved her symptoms from watery to mushy stools but did not fully resolve the issue. She will continue with the current regimen and consider adding octreotide to her home regimen if diarrhea starts at home. The potential side effects of octreotide, including nausea, were discussed.   10/1/2024 she reports using octreotide self-injection at home, which has significantly improved her diarrhea. Most of the time, she only requires it once a day and occasionally twice a day, depending on the severity of her diarrhea. She is satisfied with the results, which have  improved her quality of life and ability to eat properly.   She will also use Lomotil and the Imodium as needed.  Patient is very tearful in the office 10/16/2024 regarding debilitating diarrhea and interference with quality of life.  She questions potential surgical intervention and placement of colostomy due to ongoing pain in her rectum and burning of her skin from diarrhea.  We discussed adjustments today including increased dose of octreotide to 200 mcg 3 times daily for diarrhea.  Chemotherapy dose adjustments.  Diarrhea was slightly improved following most recent cycle of chemotherapy.  Continue supportive care  12/4/2024 patient reports that Avastin may be the agent causing her diarrhea.  So we will decrease dose by 20% and to see how things going.  Improved diarrhea with dose reduction to Avastin.  Continue previous dosing.   Continue to be improved with continued octreotide 100 mg 3 times daily.  She also has Lomotil and Imodium to utilize for breakthrough symptoms.    3.   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  Low-dose Lovenox discontinued and initiated Xarelto 20 mg daily.  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  Patient held Xarelto for 2 days prior to MediPort removal from the left chest wall and placement of new port in the right chest wall on 12/18/2020.  She resumed Xarelto that evening.  Progressive swelling in the neck, face, upper extremities prompting hospitalization with CT scan showing  thrombosis in the right subclavian vein and SVC.  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  Although she had been off of Xarelto for a few days, clearly Xarelto was not prohibiting progression of her thrombosis after she resumed treatment and there was some evidence to suggest thrombosis had been present at least in the innominate vein on prior scan in September but now appears chronic.  Current acute thrombosis involving SVC and right subclavian.  Patient was admitted and placed on heparin drip  Patient was evaluated by vascular surgery and intervention was not recommended for thrombolysis/thrombectomy.  On 12/22/2020, the patient developed worsening shortness of breath, increasing upper extremity edema consistent with worsening SVC syndrome.  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  On 2/2/2021, we discussed that side effects of Avastin/biosimilar related to thrombosis.  Since this patient already has been on Lovenox, I think the benefits from treatment for her cancer will outweigh that risk of thrombosis, will proceed ahead with Avastin.  I cautioned patient to watch out for signs of worsening thrombosis patient voiced understanding.   5/11/2021 Lovenox dosing will be adjusted due to patient's weight loss.  We discussed she needs 1 mg/kg.  Her syringes are 100 mg syringes she will do 0.9 mL subcu every 12 hours.  8/3/2021 Patient continues to have bruising on abdomen from lovenox injections.  Will need to monitor weight and adjust dosing in future if further weight loss.   Stable condition on 9/28/2021.  Continue Lovenox anticoagulation.    Patient reports no chest pain no dyspnea no leg edema. However she had bruising and also most recently abnormal small abscess for which she actually went to ER on 11/29/2021,  had I&D. The wound is healing. No further drainage. Denies fever sweating or chills. After discussion, she will continue Lovenox injection for now.   On 3/23/2022, patient reports good tolerance of Lovenox.  Nevertheless she still has small quantity of pus in the left lower abdomen abscess, she squeezes it every day to prevent it became worse.  She reports no fever sweating chills.  Recommended to start Augmentin 875 mg twice a day for 7 days.  She will continue Lovenox indefinitely.  On 4/12/2022, patient reports resolution of the small abscess at left lower abdominal wall.   Patient had CTA again on 11/2/2024 due to continued pleuritic pain that was negative for PE.  Continue Lovenox 1 mg/kg twice daily without signs or symptoms of bleeding, no signs or symptoms of thrombosis.      4.  Mild anemia.   She also has history of iron deficiency.  Iron studies reveal iron saturation of 10%.  She was started on oral iron but was unable to tolerate due to GI side effects.   Status post Injectafer 2/5/2020 and 2/12/2020   Improved hemoglobin 13.5 on 1/5/2021.  3/16/2020 when hemoglobin now up to 16.2, hematocrit 47.6.  Patient admits that she has started smoking again, and I have encouraged her to quit.  She denies any snoring or sleep apnea diagnosis.  Patient is not taking oral iron.  We will closely monitor.  3/30/2020 hemoglobin 15.7, HCT 47.5.  Patient states that she has cut back significantly on her smoking, although not quit yet.  I have encouraged her to continue working on this.  We will continue to closely monitor.  Laboratory studies 6/22/2021 reported excellent folate > 20 ng/mL, however low normal B12 level 357 pg/mL.    On 7/6/2021, normal hemoglobin 15.8, .9 and HCT 47.2%.  Patient was asked to start oral vitamin B12 at 1000 mcg daily.   Lab study on 12/14/2021 reported excellent ferritin 296 and iron saturation 25% with free iron 104 TIBC 424. Hemoglobin was 15.2 with .2.   Normal hemoglobin  14.6 on 3/15/2022.  Iron study reported normal ferritin 129.5 ng/mL however slightly low iron saturation 11%.  Continue to monitor for now.  On 1/29/2025 hemoglobin is normal at 13.6.   3/19/2025 hemoglobin normal at 13.7    5.  Peripheral neuropathy secondary to chemotherapy.   This is mild involving bilateral hands and feet as reported on 9/16/2020.   Will avoid chemotherapy with oxaliplatin.  Stable mild neuropathy as of 11/10/2020  Patient reports worsening peripheral neuropathy and cycle #5 chemotherapy on 12/8/2020 with and without irinotecan.   On 1/5/2021, patient reports improvement of peripheral neuropathy, will add irinotecan back to chemotherapy.  Continue to monitor and adjust medication.  Stable.      6.  Depression.  Patient seen by JULIET Davenport on 11/9/2020.  She was started on mirtazapine.  Lexapro was discontinued.  This has definitely improved her mood with the patient feeling overall much better.  She is sleeping better.  Appetite is improved with her actually eating more than she wants to.    Condition is stable.  She plans to continue follow-up with supportive oncology.      7. Malfunction of the portal catheter  Patient had a PowerPort placement on the left upper chest by Dr. Joseph on 8/9/2019.  Patient reports there was no blood drawn from the portal catheter. CT scan of the chest on 7/7/2023 reported occlusion of the SVC around to the Port-A-Cath tubing. I recommend trying activase to see if it helps.  Otherwise, we may have to remove the catheter. Clinically, patient has no signs of SVC syndrome.  On 11/03/2023, the patient reports that her Port-A-Cath was able to be used for a CT scan for the injection of intravenous dye; however, there was no blood return, so we needed to draw from her arm for the blood test. We will continue to keep Port-A-Cath for now.  On 02/09/2024, the patient reports that the port was able to be flushed. However, no blood was returned. We will continue to  flush every 6 weeks.  Port dye study 10/31/2024 showing correct position of port.  Fibrin sheath present.  No issue today.    8. Internal hemorrhoids.  She has large internal hemorrhoids diagnosed during a flexible sigmoidoscopy on 09/11/2024. Hydrocortisone cream was prescribed for treatment.   Not active problem today.    9.  This patient also has strong family history for malignancy   The patient had appointment with genetic counseling on September 3, 2019.  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    10. Hypertension.  Continue management of hypertension and follow up with PCP.      11.  Chest and back pain  Ports this has been ongoing for about 4 weeks.  Started in her right chest/rib cage.  Originally this pain had completely resolved, however returned when she received her next chemotherapy infusion. She felt like the pain worsened after she was unhooked from her 5-FU and that the pain spread to her right scapular/back area.  Since unhook she has continued with pain in her right chest/back that has required her to take pain medicine regularly which is not typical for her.  She has pain with deep inspiration and pain with certain movements.  She also has pain when she pushes on the tissue around her port, though there is no redness or warmth around her port site aside from red rash in the shape of a bandaid and she reports being sensitive to bandaids.  Reviewed all of patients symptoms with Dr. Nguyen who recommended obtaining doppler right upper extremity and PET scan for further evaluation of this severe right sided pain that has been evaluated by CTA without any signs of what could be causing her pain.  Of note she was seen in the ED 11/2/2024, had CT chest performed which was negative for PE.  EKG and troponin looked okay.  She was discharged home.    Healing fracture of the right 2nd rib. The PET scan on 11/12/2024 showed new hypermetabolic activity at a healing fracture on the anterior aspect of the  right second rib with SUV 5.7. The patient should avoid activities that may exacerbate the pain and consider pain management options as needed.    12/4/2024 the patient reports improvement in rib pain, with the ability to lay on the affected side, though still experiencing some soreness and pain upon sneezing or deep breathing.    Carole Weaver reports a pain score of 0.  Given her pain assessment as noted, treatment options were discussed and the following options were decided upon as a follow-up plan to address the patient's pain: continuation of current treatment plan for pain.        PLAN:    Proceed today with chemotherapy maintaining previous dosing.  5-FU 1440 mg/m² and bevacizumab 4 mg/kg.  Leucovorin will be maintained at 280 mg/m²  Return Friday, 3/21/2025 for 5-FU unhook  She will continue octreotide 100 mcg every 8 hours for chemotherapy-induced diarrhea, along with Imodium and Lomotil as needed.   Subsequently, she will return every 2 weeks for laboratory studies and evaluation with APRN/MD for chemotherapy.   Continue Lovenox subcutaneously every 12 hours for anticoagulation.  Continue Magic barrier cream as needed.   Continue Norco 5/325 as needed for pain.  1 to 2 tablets.  The patient was encouraged to utilize this for her musculoskeletal pain following her fall  Continue Protonix 40 mg daily.   Continue Gas-X.   Return in 2 weeks for CBC, CMP, nurse practitioner follow-up and continued 5-FU, leucovorin and Avastin  MD follow-up in 4 weeks with CBC, CMP, 5-FU leucovorin and Avastin    The patient is on a high risk medication requiring close monitoring for toxicity.       Patience Lorenzo, APRN  03/19/2025        CC:  WAYNE Worley MD Justin Anderson, MD

## 2025-03-21 ENCOUNTER — OFFICE VISIT (OUTPATIENT)
Dept: INTERNAL MEDICINE | Facility: CLINIC | Age: 45
End: 2025-03-21
Payer: COMMERCIAL

## 2025-03-21 ENCOUNTER — INFUSION (OUTPATIENT)
Dept: ONCOLOGY | Facility: HOSPITAL | Age: 45
End: 2025-03-21
Payer: COMMERCIAL

## 2025-03-21 VITALS
OXYGEN SATURATION: 97 % | HEART RATE: 65 BPM | SYSTOLIC BLOOD PRESSURE: 122 MMHG | DIASTOLIC BLOOD PRESSURE: 80 MMHG | BODY MASS INDEX: 30.02 KG/M2 | WEIGHT: 180.2 LBS | HEIGHT: 65 IN

## 2025-03-21 DIAGNOSIS — I25.10 CORONARY ARTERY CALCIFICATION: ICD-10-CM

## 2025-03-21 DIAGNOSIS — I10 PRIMARY HYPERTENSION: Chronic | ICD-10-CM

## 2025-03-21 DIAGNOSIS — Z00.00 ANNUAL PHYSICAL EXAM: Primary | ICD-10-CM

## 2025-03-21 DIAGNOSIS — C78.00 MALIGNANT NEOPLASM METASTATIC TO LUNG, UNSPECIFIED LATERALITY: ICD-10-CM

## 2025-03-21 DIAGNOSIS — E78.2 MIXED HYPERLIPIDEMIA: ICD-10-CM

## 2025-03-21 DIAGNOSIS — C20 ADENOCARCINOMA OF RECTUM: Chronic | ICD-10-CM

## 2025-03-21 DIAGNOSIS — D68.51 HETEROZYGOUS FACTOR V LEIDEN MUTATION: Chronic | ICD-10-CM

## 2025-03-21 DIAGNOSIS — F33.9 MONOPOLAR DEPRESSION: Chronic | ICD-10-CM

## 2025-03-21 DIAGNOSIS — R00.0 TACHYCARDIA: Chronic | ICD-10-CM

## 2025-03-21 DIAGNOSIS — F41.9 ANXIETY: Chronic | ICD-10-CM

## 2025-03-21 DIAGNOSIS — K52.9 CHRONIC DIARRHEA: Chronic | ICD-10-CM

## 2025-03-21 DIAGNOSIS — Z86.711 HISTORY OF PULMONARY EMBOLISM: ICD-10-CM

## 2025-03-21 DIAGNOSIS — G62.0 DRUG-INDUCED PERIPHERAL NEUROPATHY: ICD-10-CM

## 2025-03-21 DIAGNOSIS — C20 ADENOCARCINOMA OF RECTUM: ICD-10-CM

## 2025-03-21 DIAGNOSIS — Z79.899 ENCOUNTER FOR LONG-TERM (CURRENT) USE OF HIGH-RISK MEDICATION: Primary | ICD-10-CM

## 2025-03-21 PROCEDURE — 99396 PREV VISIT EST AGE 40-64: CPT | Performed by: NURSE PRACTITIONER

## 2025-03-21 NOTE — PROGRESS NOTES
Chief Complaint  Annual Exam     Subjective:      History of Present Illness {CC  Problem List  Visit  Diagnosis   Encounters  Notes  Medications  Labs  Result Review Imaging  Media :23}     Carole Weaver presents to [facility] for:  annual excluding GYN exam    She has multiple chronic conditions:   Rectal cancer 7/2019 pT3N1 rectal cancer stage IIIb is S/P laparoscopic LAR with diverting loop ileostomy on 12/02/2019 and S/P Ileostomy takedown 11/14/2022.  , peripheral neuropathy, chronic anticoagulation (hetero Factor V Leiden), history of multiple thromboses including a SVC thrombosis (which occurred while on Xarelto) as well as a PE, hypertension, anxiety, depression, current smoker, coronary artery calcification mid LAD, Sinus tachycardia, vitamin d deficiency       Adenocarcinoma of rectum:     Disease progression, patient was restarted back on chemotherapy with 5-FU leucovorin and bevacizumab 8/21/2024      Chronic diarrhea: she had flex sigmoid on 9/11/24  Octreotide has improved BMs so she can leave the home.     Off treatment today:   Needs to call to get colonoscopy scheduled.                 Carole is here for coordination of medical care, to discuss health maintenance, disease prevention as well as to followup on medical problems.     Patient Care Team:  Deepika Vela III, NP-C as PCP - General (Family Medicine)  Padmaja Ye MD as Referring Physician (Colon and Rectal Surgery)  Dong Nguyen MD PhD as Consulting Physician (Hematology and Oncology)  Wendy Cottrell MD as Consulting Physician (Obstetrics and Gynecology)  Tasha Bustos MD as Consulting Physician (Cardiology)  Charissa Messina PA-C as Physician Assistant (Colon and Rectal Surgery)  Lurdes Tucker PA-C as Physician Assistant (Physician Assistant)  Alverto Stephen MD as Consulting Physician (Radiation Oncology)     Activity level is moderate.      Health  and Weight:   Weight trend is   Wt Readings from Last 4 Encounters:   03/21/25 81.7 kg (180 lb 3.2 oz)   03/19/25 81.6 kg (180 lb)   03/05/25 82.1 kg (181 lb)   02/13/25 80.4 kg (177 lb 3.2 oz)       BMI is >= 25 and <30. (Overweight) The following options were offered after discussion;: nutrition counseling/recommendations      Health Maintenance Female:  GYN:  LV - made referral to new GYN - looks like had to be rescheduled due to provider being out, needs to be rescheduled.     Patient's last mammogram was  Last Completed Mammogram            Upcoming       MAMMOGRAM (Every 2 Years) Next due on 1/18/2026 01/18/2024  SCANNED - MAMMO    01/16/2023  MAMMO SCREENING DIGITAL TOMOSYNTHESIS BILATERAL W CAD    12/07/2021  SCANNED - MAMMO    08/01/2019  SCANNED - MAMMO                           Advised routine self-breast exams monthly.  Pap smears have been:   Postmenopausal: [] Yes       Colon cancer: scopes - Dr Ye     Vaccines:   [] Flu     [] Tdap   [] Prevnar 20    [] Shingrix (shingles: series of 2)   [] COVID (booster)    []   []Declines vaccines      Last eye exam:     Advise regular sunscreen.        I have reviewed patient's medical history, any new submitted information provided by patient or medical assistant and updated medical record.      Objective:      Physical Exam  Vitals reviewed.   Constitutional:       Appearance: Normal appearance. She is well-developed.   Neck:      Thyroid: No thyromegaly.   Cardiovascular:      Rate and Rhythm: Normal rate and regular rhythm.      Pulses: Normal pulses.      Heart sounds: Normal heart sounds.   Pulmonary:      Effort: Pulmonary effort is normal.      Breath sounds: Normal breath sounds.      Comments: E/U   Abdominal:      General: Bowel sounds are normal.   Musculoskeletal:      Cervical back: Normal range of motion and neck supple.   Lymphadenopathy:      Cervical: No cervical adenopathy.   Skin:     General: Skin is warm and dry.      Capillary  "Refill: Capillary refill takes 2 to 3 seconds.   Neurological:      Mental Status: She is alert and oriented to person, place, and time.   Psychiatric:         Mood and Affect: Mood normal.         Behavior: Behavior normal. Behavior is cooperative.         Thought Content: Thought content normal.         Judgment: Judgment normal.        Result Review  Data Reviewed:{ Labs  Result Review  Imaging  Med Tab  Media :23}                Vital Signs:   /80 (BP Location: Left arm, Patient Position: Sitting, Cuff Size: Adult)   Pulse 65   Ht 165.1 cm (65\")   Wt 81.7 kg (180 lb 3.2 oz)   SpO2 97%   BMI 29.99 kg/m²   Estimated body mass index is 29.99 kg/m² as calculated from the following:    Height as of this encounter: 165.1 cm (65\").    Weight as of this encounter: 81.7 kg (180 lb 3.2 oz).        Requested Prescriptions      No prescriptions requested or ordered in this encounter       Routine medications provided by this office will also be refilled via pharmacy request.       Current Outpatient Medications:     bismuth subsalicylate (PEPTO BISMOL) 262 MG/15ML suspension, Take 15 mL by mouth 4 (Four) Times a Day., Disp: , Rfl:     clonazePAM (KlonoPIN) 1 MG tablet, Take 1 tablet as needed daily for anxiety. May take one additional as needed for severe anxiety., Disp: 45 tablet, Rfl: 3    Cyanocobalamin (VITAMIN B-12 PO), Take 1 tablet by mouth 3 (Three) Times a Week. M-W-F, Disp: , Rfl:     dicyclomine (BENTYL) 20 MG tablet, TAKE 1 TABLET BY MOUTH EVERY 6 (SIX) HOURS AS NEEDED FOR IRRITABLE BOWEL SYMPTOMS, Disp: 360 tablet, Rfl: 2    diphenoxylate-atropine (LOMOTIL) 2.5-0.025 MG per tablet, TAKE 2 TABLETS BY MOUTH 4 TIMES A DAY, Disp: 240 tablet, Rfl: 11    Enoxaparin Sodium (LOVENOX) 100 MG/ML solution prefilled syringe syringe, INJECT 1 ML UNDER THE SKIN INTO THE APPROPRIATE AREA AS DIRECTED EVERY 12 (TWELVE) HOURS., Disp: 60 mL, Rfl: 2    escitalopram (LEXAPRO) 10 MG tablet, Take 1 tablet by mouth " "Daily., Disp: 90 tablet, Rfl: 1    famotidine (PEPCID) 20 MG tablet, TAKE 1 TABLET BY MOUTH TWICE A DAY, Disp: 180 tablet, Rfl: 2    hydrocortisone 2.5 % cream, APPLY RECTALLY 3 TIMES DAILY. INCLUDE APPLICATOR., Disp: , Rfl:     Hydrocortisone, Perianal, (ANUSOL-HC) 2.5 % rectal cream, Apply rectally 3 times daily.  Include applicator., Disp: 30 g, Rfl: 1    Loperamide HCl (IMODIUM PO), Take  by mouth As Needed., Disp: , Rfl:     Loratadine 10 MG capsule, Take 1 capsule by mouth Every Evening., Disp: , Rfl:     metoprolol succinate XL (TOPROL-XL) 25 MG 24 hr tablet, TAKE 0.5 TABLETS BY MOUTH EVERY NIGHT, Disp: 45 tablet, Rfl: 2    nystatin (MYCOSTATIN) 306257 UNIT/GM cream, Apply 1 Application topically to the appropriate area as directed 2 (Two) Times a Day., Disp: 30 g, Rfl: 2    octreotide (sandoSTATIN) 100 MCG/ML injection, Inject 1 mL under the skin into the appropriate area as directed 3 (Three) Times a Day., Disp: 90 mL, Rfl: 2    ondansetron (ZOFRAN) 8 MG tablet, TAKE 1 TABLET BY MOUTH THREE TIMES A DAY AS NEEDED FOR NAUSEA AND VOMITING, Disp: 60 tablet, Rfl: 1    rosuvastatin (CRESTOR) 5 MG tablet, Take 1 tablet by mouth Daily., Disp: 90 tablet, Rfl: 0    Syringe/Needle, Disp, 27G X 1/2\" 1 ML misc, Use as directed for octreotide injections, Disp: 100 each, Rfl: 3    HYDROcodone-acetaminophen (NORCO) 5-325 MG per tablet, Take 2 tablets by mouth Every 6 (Six) Hours As Needed for Moderate Pain., Disp: 90 tablet, Rfl: 0    nystatin-zinc oxide-hydrocortisone-bacitracin, Apply 1 Application topically to the appropriate area as directed Daily As Needed (As needed)., Disp: 60 g, Rfl: 2    nystatin-zinc oxide-hydrocortisone-bacitracin, Apply topically to the appropriate area as directed Daily As Needed., Disp: 60 g, Rfl: 3    pantoprazole (PROTONIX) 40 MG EC tablet, TAKE 1 TABLET BY MOUTH EVERY DAY, Disp: 90 tablet, Rfl: 1     Assessment and Plan:      Assessment and Plan {CC Problem List  Visit Diagnosis  ROS  " Review (Popup)  [facility]  Quality  BestPractice  Medications  SmartSets  SnapShot Encounters  Media :23}     Diagnoses and all orders for this visit:    1. Annual physical exam (Primary)  -     Lipid panel; Future    2. Primary hypertension  Overview:  Lisinopril 10 mg too much. (weakness, dizziness)     Continue to monitor - if need to restart, advised lower dose.      Assessment & Plan:  Hypertension is improving with treatment.  Continue current treatment regimen.  Blood pressure will be reassessed at the next regular appointment.    Continues Toprol for tachycardia 2/2 chemo.       3. Heterozygous factor V Leiden mutation  Assessment & Plan:  Continues lovenox       4. History of pulmonary embolism    5. Chronic diarrhea    6. Adenocarcinoma of rectum    7. Malignant neoplasm metastatic to lung, unspecified laterality    8. Anxiety  Assessment & Plan:  Klonopin: effective   Continue to take sparingly       9. Monopolar depression  Overview:  lexapro 20 mg: decreased libido   Prior treatment:  Wellbutrin (believes it had effect on libido)      Assessment & Plan:  Improved on lexapro - continue       10. Drug-induced peripheral neuropathy    11. Tachycardia  Assessment & Plan:  Improved on toprol   Continue       12. Mixed hyperlipidemia  Assessment & Plan:   Lipid abnormalities are improving with treatment    Plan:  Continue same medication/s without change.    Continue crestor   Discussed medication dosage, use, side effects, and goals of treatment in detail.    Counseled patient on lifestyle modifications to help control hyperlipidemia.     Patient Treatment Goals:   LDL goal is under 100    Followup in 6 months.      13. Coronary artery calcification  Assessment & Plan:  Continue statin   Due for lipids - will add to labs done at oncology                No orders of the defined types were placed in this encounter.            Follow Up {Instructions Charge Capture  Follow-up Communications  :23}     Return in about 3 months (around 6/21/2025).      Patient was given instructions and counseling regarding her condition or for health maintenance advice. Please see specific information pulled into the AVS if appropriate.    [place] disclaimer:   Much of this encounter note is an electronic transcription/translation of spoken language to printed text. The electronic translation of spoken language may permit erroneous, or at times, nonsensical words or phrases to be inadvertently transcribed; Although I have reviewed the note for such errors, some may still exist.     Additional Patient Counseling:       There are no Patient Instructions on file for this visit.

## 2025-03-24 DIAGNOSIS — G89.3 CANCER ASSOCIATED PAIN: ICD-10-CM

## 2025-03-24 RX ORDER — PANTOPRAZOLE SODIUM 40 MG/1
40 TABLET, DELAYED RELEASE ORAL DAILY
Qty: 90 TABLET | Refills: 1 | Status: SHIPPED | OUTPATIENT
Start: 2025-03-24

## 2025-03-24 RX ORDER — HYDROCODONE BITARTRATE AND ACETAMINOPHEN 5; 325 MG/1; MG/1
2 TABLET ORAL EVERY 6 HOURS PRN
Qty: 90 TABLET | Refills: 0 | Status: SHIPPED | OUTPATIENT
Start: 2025-03-24

## 2025-03-25 DIAGNOSIS — Z00.00 ANNUAL PHYSICAL EXAM: Primary | ICD-10-CM

## 2025-03-25 LAB — REF LAB TEST RESULTS: NORMAL

## 2025-03-27 PROBLEM — R63.4 LOSS OF WEIGHT: Status: RESOLVED | Noted: 2019-12-10 | Resolved: 2025-03-27

## 2025-03-27 PROBLEM — K61.1 PERIRECTAL ABSCESS: Status: RESOLVED | Noted: 2020-08-12 | Resolved: 2025-03-27

## 2025-03-27 PROBLEM — N20.1 RIGHT URETERAL STONE: Status: RESOLVED | Noted: 2019-10-01 | Resolved: 2025-03-27

## 2025-03-27 PROBLEM — M54.2 NECK PAIN ON LEFT SIDE: Status: RESOLVED | Noted: 2023-02-05 | Resolved: 2025-03-27

## 2025-03-27 PROBLEM — Z85.048 HISTORY OF RECTAL CANCER: Status: RESOLVED | Noted: 2021-05-19 | Resolved: 2025-03-27

## 2025-03-27 PROBLEM — E78.2 MIXED HYPERLIPIDEMIA: Status: ACTIVE | Noted: 2025-03-27

## 2025-03-27 PROBLEM — N20.0 KIDNEY STONE ON RIGHT SIDE: Status: RESOLVED | Noted: 2019-10-07 | Resolved: 2025-03-27

## 2025-03-27 NOTE — ASSESSMENT & PLAN NOTE
Lipid abnormalities are improving with treatment    Plan:  Continue same medication/s without change.    Continue crestor   Discussed medication dosage, use, side effects, and goals of treatment in detail.    Counseled patient on lifestyle modifications to help control hyperlipidemia.     Patient Treatment Goals:   LDL goal is under 100    Followup in 6 months.

## 2025-04-02 ENCOUNTER — TELEPHONE (OUTPATIENT)
Dept: ONCOLOGY | Facility: CLINIC | Age: 45
End: 2025-04-02
Payer: COMMERCIAL

## 2025-04-02 NOTE — TELEPHONE ENCOUNTER
Patient called stating her dog is having a seizure in the backyard and she cannot get him back into the house and she cannot leave him. Patient states she needs to cancel her appointment for today. Explained to patient will try to get her rescheduled and call her back

## 2025-04-02 NOTE — TELEPHONE ENCOUNTER
Patient returning call per Dr Nguyen will postpone treatment to next week due to patient cannot have 5 FU disconnected at the hospital on the weekend. Message sent to Sandrita @ appointment desk to reschedule patient's appointments.    [facility]

## 2025-04-05 DIAGNOSIS — Z85.048 HISTORY OF RECTAL CANCER: ICD-10-CM

## 2025-04-07 RX ORDER — ENOXAPARIN SODIUM 100 MG/ML
1 INJECTION SUBCUTANEOUS EVERY 12 HOURS SCHEDULED
Qty: 60 ML | Refills: 2 | Status: SHIPPED | OUTPATIENT
Start: 2025-04-07

## 2025-04-07 RX ORDER — DIPHENOXYLATE HYDROCHLORIDE AND ATROPINE SULFATE 2.5; .025 MG/1; MG/1
TABLET ORAL
Qty: 240 TABLET | Refills: 5 | Status: SHIPPED | OUTPATIENT
Start: 2025-04-07

## 2025-04-09 ENCOUNTER — INFUSION (OUTPATIENT)
Dept: ONCOLOGY | Facility: HOSPITAL | Age: 45
End: 2025-04-09
Payer: COMMERCIAL

## 2025-04-09 ENCOUNTER — CLINICAL SUPPORT (OUTPATIENT)
Dept: OTHER | Facility: HOSPITAL | Age: 45
End: 2025-04-09
Payer: COMMERCIAL

## 2025-04-09 ENCOUNTER — OFFICE VISIT (OUTPATIENT)
Dept: ONCOLOGY | Facility: CLINIC | Age: 45
End: 2025-04-09
Payer: COMMERCIAL

## 2025-04-09 VITALS
BODY MASS INDEX: 29.87 KG/M2 | SYSTOLIC BLOOD PRESSURE: 104 MMHG | TEMPERATURE: 97.4 F | HEIGHT: 65 IN | DIASTOLIC BLOOD PRESSURE: 73 MMHG | OXYGEN SATURATION: 95 % | HEART RATE: 93 BPM | WEIGHT: 179.3 LBS

## 2025-04-09 DIAGNOSIS — C20 ADENOCARCINOMA OF RECTUM: ICD-10-CM

## 2025-04-09 DIAGNOSIS — Z79.899 ENCOUNTER FOR LONG-TERM (CURRENT) USE OF HIGH-RISK MEDICATION: ICD-10-CM

## 2025-04-09 DIAGNOSIS — C78.00 MALIGNANT NEOPLASM METASTATIC TO LUNG, UNSPECIFIED LATERALITY: ICD-10-CM

## 2025-04-09 DIAGNOSIS — D68.51 HETEROZYGOUS FACTOR V LEIDEN MUTATION: Chronic | ICD-10-CM

## 2025-04-09 DIAGNOSIS — C78.00 MALIGNANT NEOPLASM METASTATIC TO LUNG, UNSPECIFIED LATERALITY: Primary | ICD-10-CM

## 2025-04-09 DIAGNOSIS — Z79.899 ENCOUNTER FOR LONG-TERM (CURRENT) USE OF HIGH-RISK MEDICATION: Primary | ICD-10-CM

## 2025-04-09 DIAGNOSIS — Z79.01 ON ANTICOAGULANT THERAPY: ICD-10-CM

## 2025-04-09 DIAGNOSIS — T45.1X5D ADVERSE EFFECT OF ANTINEOPLASTIC AND IMMUNOSUPPRESSIVE DRUGS, SUBSEQUENT ENCOUNTER: ICD-10-CM

## 2025-04-09 DIAGNOSIS — Z86.711 HISTORY OF PULMONARY EMBOLISM: ICD-10-CM

## 2025-04-09 DIAGNOSIS — T45.1X5A CHEMOTHERAPY INDUCED DIARRHEA: ICD-10-CM

## 2025-04-09 DIAGNOSIS — Z79.69 ON ANTINEOPLASTIC CHEMOTHERAPY: ICD-10-CM

## 2025-04-09 DIAGNOSIS — Z00.00 ANNUAL PHYSICAL EXAM: ICD-10-CM

## 2025-04-09 DIAGNOSIS — K52.1 CHEMOTHERAPY INDUCED DIARRHEA: ICD-10-CM

## 2025-04-09 DIAGNOSIS — Z79.01 CHRONIC ANTICOAGULATION: Chronic | ICD-10-CM

## 2025-04-09 LAB
ALBUMIN SERPL-MCNC: 3.4 G/DL (ref 3.5–5.2)
ALBUMIN/GLOB SERPL: 1.3 G/DL
ALP SERPL-CCNC: 92 U/L (ref 39–117)
ALT SERPL W P-5'-P-CCNC: 15 U/L (ref 1–33)
ANION GAP SERPL CALCULATED.3IONS-SCNC: 13.5 MMOL/L (ref 5–15)
AST SERPL-CCNC: 16 U/L (ref 1–32)
BASOPHILS # BLD AUTO: 0.02 10*3/MM3 (ref 0–0.2)
BASOPHILS NFR BLD AUTO: 0.3 % (ref 0–1.5)
BILIRUB SERPL-MCNC: 0.2 MG/DL (ref 0–1.2)
BILIRUB UR QL STRIP: NEGATIVE
BUN SERPL-MCNC: 6 MG/DL (ref 6–20)
BUN/CREAT SERPL: 10.2 (ref 7–25)
CALCIUM SPEC-SCNC: 8.7 MG/DL (ref 8.6–10.5)
CHLORIDE SERPL-SCNC: 103 MMOL/L (ref 98–107)
CHOLEST SERPL-MCNC: 153 MG/DL (ref 0–200)
CLARITY UR: CLEAR
CO2 SERPL-SCNC: 22.5 MMOL/L (ref 22–29)
COLOR UR: YELLOW
CREAT SERPL-MCNC: 0.59 MG/DL (ref 0.57–1)
DEPRECATED RDW RBC AUTO: 52.7 FL (ref 37–54)
EGFRCR SERPLBLD CKD-EPI 2021: 113.4 ML/MIN/1.73
EOSINOPHIL # BLD AUTO: 0.1 10*3/MM3 (ref 0–0.4)
EOSINOPHIL NFR BLD AUTO: 1.7 % (ref 0.3–6.2)
ERYTHROCYTE [DISTWIDTH] IN BLOOD BY AUTOMATED COUNT: 14.3 % (ref 12.3–15.4)
GLOBULIN UR ELPH-MCNC: 2.7 GM/DL
GLUCOSE SERPL-MCNC: 147 MG/DL (ref 65–99)
GLUCOSE UR STRIP-MCNC: NEGATIVE MG/DL
HCT VFR BLD AUTO: 43.1 % (ref 34–46.6)
HDLC SERPL-MCNC: 39 MG/DL (ref 40–60)
HGB BLD-MCNC: 13.9 G/DL (ref 12–15.9)
HGB UR QL STRIP.AUTO: ABNORMAL
IMM GRANULOCYTES # BLD AUTO: 0.02 10*3/MM3 (ref 0–0.05)
IMM GRANULOCYTES NFR BLD AUTO: 0.3 % (ref 0–0.5)
KETONES UR QL STRIP: NEGATIVE
LDLC SERPL CALC-MCNC: 91 MG/DL (ref 0–100)
LDLC/HDLC SERPL: 2.26 {RATIO}
LEUKOCYTE ESTERASE UR QL STRIP.AUTO: ABNORMAL
LYMPHOCYTES # BLD AUTO: 1.2 10*3/MM3 (ref 0.7–3.1)
LYMPHOCYTES NFR BLD AUTO: 19.9 % (ref 19.6–45.3)
MCH RBC QN AUTO: 32.1 PG (ref 26.6–33)
MCHC RBC AUTO-ENTMCNC: 32.3 G/DL (ref 31.5–35.7)
MCV RBC AUTO: 99.5 FL (ref 79–97)
MONOCYTES # BLD AUTO: 0.4 10*3/MM3 (ref 0.1–0.9)
MONOCYTES NFR BLD AUTO: 6.6 % (ref 5–12)
NEUTROPHILS NFR BLD AUTO: 4.3 10*3/MM3 (ref 1.7–7)
NEUTROPHILS NFR BLD AUTO: 71.2 % (ref 42.7–76)
NITRITE UR QL STRIP: NEGATIVE
NRBC BLD AUTO-RTO: 0 /100 WBC (ref 0–0.2)
PH UR STRIP.AUTO: 7 [PH] (ref 4.5–8)
PLATELET # BLD AUTO: 184 10*3/MM3 (ref 140–450)
PMV BLD AUTO: 9.7 FL (ref 6–12)
POTASSIUM SERPL-SCNC: 4 MMOL/L (ref 3.5–5.2)
PROT SERPL-MCNC: 6.1 G/DL (ref 6–8.5)
PROT UR QL STRIP: NEGATIVE
RBC # BLD AUTO: 4.33 10*6/MM3 (ref 3.77–5.28)
SODIUM SERPL-SCNC: 139 MMOL/L (ref 136–145)
SP GR UR STRIP: 1.01 (ref 1–1.03)
TRIGL SERPL-MCNC: 129 MG/DL (ref 0–150)
UROBILINOGEN UR QL STRIP: ABNORMAL
VLDLC SERPL-MCNC: 23 MG/DL (ref 5–40)
WBC NRBC COR # BLD AUTO: 6.04 10*3/MM3 (ref 3.4–10.8)

## 2025-04-09 PROCEDURE — 96413 CHEMO IV INFUSION 1 HR: CPT

## 2025-04-09 PROCEDURE — 96367 TX/PROPH/DG ADDL SEQ IV INF: CPT

## 2025-04-09 PROCEDURE — 25010000002 DEXAMETHASONE SODIUM PHOSPHATE 100 MG/10ML SOLUTION: Performed by: INTERNAL MEDICINE

## 2025-04-09 PROCEDURE — 25010000002 BEVACIZUMAB 100 MG/4ML SOLUTION 4 ML VIAL: Performed by: INTERNAL MEDICINE

## 2025-04-09 PROCEDURE — 25810000003 SODIUM CHLORIDE 0.9 % SOLUTION 250 ML FLEX CONT: Performed by: INTERNAL MEDICINE

## 2025-04-09 PROCEDURE — 81003 URINALYSIS AUTO W/O SCOPE: CPT

## 2025-04-09 PROCEDURE — 25010000002 LEUCOVORIN CALCIUM PER 50 MG: Performed by: INTERNAL MEDICINE

## 2025-04-09 PROCEDURE — 80053 COMPREHEN METABOLIC PANEL: CPT

## 2025-04-09 PROCEDURE — 80061 LIPID PANEL: CPT | Performed by: NURSE PRACTITIONER

## 2025-04-09 PROCEDURE — 25010000002 FOSAPREPITANT PER 1 MG: Performed by: INTERNAL MEDICINE

## 2025-04-09 PROCEDURE — 25010000002 FLUOROURACIL PER 500 MG: Performed by: INTERNAL MEDICINE

## 2025-04-09 PROCEDURE — 96375 TX/PRO/DX INJ NEW DRUG ADDON: CPT

## 2025-04-09 PROCEDURE — 25010000002 PALONOSETRON PER 25 MCG: Performed by: INTERNAL MEDICINE

## 2025-04-09 PROCEDURE — G0498 CHEMO EXTEND IV INFUS W/PUMP: HCPCS

## 2025-04-09 PROCEDURE — 85025 COMPLETE CBC W/AUTO DIFF WBC: CPT

## 2025-04-09 RX ORDER — SODIUM CHLORIDE 9 MG/ML
20 INJECTION, SOLUTION INTRAVENOUS ONCE
Status: COMPLETED | OUTPATIENT
Start: 2025-04-09 | End: 2025-04-09

## 2025-04-09 RX ORDER — PALONOSETRON 0.05 MG/ML
0.25 INJECTION, SOLUTION INTRAVENOUS ONCE
Status: COMPLETED | OUTPATIENT
Start: 2025-04-09 | End: 2025-04-09

## 2025-04-09 RX ORDER — PALONOSETRON 0.05 MG/ML
0.25 INJECTION, SOLUTION INTRAVENOUS ONCE
Status: CANCELLED | OUTPATIENT
Start: 2025-04-09

## 2025-04-09 RX ORDER — SODIUM CHLORIDE 9 MG/ML
20 INJECTION, SOLUTION INTRAVENOUS ONCE
Status: CANCELLED | OUTPATIENT
Start: 2025-04-09

## 2025-04-09 RX ORDER — SODIUM CHLORIDE 9 MG/ML
1000 INJECTION, SOLUTION INTRAVENOUS ONCE
Status: CANCELLED | OUTPATIENT
Start: 2025-04-11

## 2025-04-09 RX ADMIN — BEVACIZUMAB 240 MG: 100 INJECTION, SOLUTION INTRAVENOUS at 13:17

## 2025-04-09 RX ADMIN — DEXAMETHASONE SODIUM PHOSPHATE 12 MG: 10 INJECTION, SOLUTION INTRAMUSCULAR; INTRAVENOUS at 13:02

## 2025-04-09 RX ADMIN — SODIUM CHLORIDE 20 ML/HR: 9 INJECTION, SOLUTION INTRAVENOUS at 12:34

## 2025-04-09 RX ADMIN — FOSAPREPITANT 100 ML: 150 INJECTION, POWDER, LYOPHILIZED, FOR SOLUTION INTRAVENOUS at 12:35

## 2025-04-09 RX ADMIN — PALONOSETRON HYDROCHLORIDE 0.25 MG: 0.25 INJECTION INTRAVENOUS at 13:01

## 2025-04-09 RX ADMIN — FLUOROURACIL 2720 MG: 50 INJECTION, SOLUTION INTRAVENOUS at 14:18

## 2025-04-09 RX ADMIN — LEUCOVORIN CALCIUM 450 MG: 350 INJECTION, POWDER, LYOPHILIZED, FOR SUSPENSION INTRAMUSCULAR; INTRAVENOUS at 13:47

## 2025-04-09 NOTE — PROGRESS NOTES
OUTPATIENT ONCOLOGY NUTRITION     Patient Name: Carole Weaver  YOB: 1980  MRN: [id number]  Assessment Date: 4/9/2025    COMMENTS: Follow up for pt with metastatic rectal cancer, hx colostomy with reversal (has no rectal vault).  Pt is receiving her #15 of FOLFOX.  Labs/Meds from today reviewed. Weight stable x 2 months.     Met with pt today in the infusion center. She reports her appetite is fine but feels like she isn't eating enough protein, craves carbs. Also admits to being a very picky eater. She had less diarrhea after her last round of chemo but continues to have urgency and increased frequency of stools.  Will have to go to the bathroom every up to 4-5 times in an hour. She hasn't tried the Enterade yet. Encouraged her to try the Enterade and see if it helps decrease how often she has to go by increasing the bulk of her stool.     Will follow throughout treatment course and be available as needed. Pt very appreciative of visit.        Reason for Assessment Follow up     Diagnosis/Problem   Metastatic Rectal Cancer   Treatment Plan Chemotherapy  FOLFOX   Frequency Q 2 weeks   Goal of cancer treatment Disease control   Comments:          Encounter Information        Nutrition/Diet History:  Regular diet, doesn't eat much protein, mostly carbs   Oral Nutrition Supplements:    Factors/Symptoms Affecting Intake: Diarrhea or loose frequent stools   Comments: Picky eater     Medical/Surgical History Past Medical History:   Diagnosis Date    Abdominopelvic abscess 08/12/2020    ADMITTED TO [facility]    Abnormal Pap smear of cervix 02/02/1998    JULIUS I    Abscess of abdominal wall 11/28/2012    SEEN AT [facility] ER    Anemia in pregnancy     Anxiety     Bilateral epiphora 04/2020    Bilateral hand swelling 05/02/2020    SEEN AT [facility] ER    Cervical lymphadenitis 06/06/2012    SEEN AT [facility] ER    Chemotherapy induced diarrhea 01/2021    Chemotherapy induced neutropenia 04/2020    Chemotherapy-induced nausea 02/2020     Chemotherapy-induced thrombocytopenia 05/2020    Chronic anticoagulation     Chronic diarrhea     Colon polyps     FOLLOWED BY DR. 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 ALEXANDRU HUNT    History of gestational diabetes     History of pre-eclampsia 1998    History of pre-eclampsia     History of radiation therapy 2019    FOLLOWED BY DR. 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    Perirectal abscess 08/12/2020    Perirectal abscess 08/12/2020    Added automatically from request for surgery [id number]      Pulmonary embolism without acute cor  pulmonale 09/20/2019    X 3; ADMITTED TO [facility]    Pulmonary nodule, right 2020    Rectal cancer 2019    STAGE IIA, INVASIVE MODERATELY DIFFERENTIATED ADENOCARCINOMA, CHEMO AND XRT FINISHED 2019    Right shoulder pain 2020    SEEN AT [facility] ER    Right ureteral stone 10/01/2019    SEEN AT [facility] ER    Right ureteral stone 10/01/2019    SEEN AT [facility] ER      SAB (spontaneous ) 1996     A2-1 INDUCED    Sciatica     Sepsis due to cellulitis 2002    LEFT GREAT TOE, ADMITTED TO [facility]    Tachycardia 2020    Thrombosis of superior vena cava 2020    AND BRACHIOCEPHALIC VEIN, ADMITTED TO [facility]    Urinary urgency 2020       Past Surgical History:   Procedure Laterality Date    ABDOMINAL SURGERY      CHOLECYSTECTOMY N/A 10/10/2003    LAPAROSCOPIC WITH CHOLANGIOGRAM, DR. JAMEY TALAVERA AT [facility]    COLON RESECTION N/A 2019    Procedure: laparoscopic low anterior colon resection with mobilization of splenic flexure and diverting loop ileostomy: ERAS;  Surgeon: Padmaja Esparza MD;  Location: [facility] OR;  Service: General    COLON RESECTION N/A 2020    Procedure: LOW ANTERIOR COLON RESECTION, COLOANAL ANASTOMOSIS, MOBILIZATION SPLENIC FLEXURE;  Surgeon: Padmaja Esparza MD;  Location: [facility] OR;  Service: General;  Laterality: N/A;    COLONOSCOPY N/A 07/15/2020    PATENT ANASTAMOSIS IN MID RECTUM, RESCOPE IN 1 YR, DR. PADMAJA ESPARZA AT [facility]    COLONOSCOPY N/A 2022    DIFFUSE AREA OF MODERATELY FRIABLE MUCOSA IN ENTIRE COLON , DIVERSION COLITIS, PATENT AND HEALTHY ANASTAMOSIS, DR. PADMAJA ESPARZA AT [facility]    COLONOSCOPY N/A 2023    6 MM SESSILE SERRATED ADENOMA POLYP IN DESCENDING, PATENT ANASTAMOSIS IN DISTAL RECTUM, RESCOPE IN 2 YRS, DR. PADMAJA ESPARZA AT [facility]    COLONOSCOPY W/ POLYPECTOMY N/A 2019    15 MM TUBULOVILLOUS ADENOMA POLYP IN HEPATIC FLEXURE, 20 MMTUBULOVILLOUS ADENOMA WITH HIGH GRADE DYSPLASIA IN RECTOSIGMOID, 4 CM MASS IN MID RECTUM, PATH: INVASIVE  MODERATELY DIFFERENTIATED ADENOCARCINOMA, DR. JENNIFER LI AT [facility]    DILATATION AND EVACUATION N/A 2009    ENDOSCOPY N/A 07/08/2019    LA GRADE A ESOPHAGITIS, GASTRITIS, ALL BIOPSIES BENIGN, DR. JENNIFER LI AT [facility]    ILEOSTOMY CLOSURE N/A 11/14/2022    Procedure: ILEOSTOMY TAKEDOWN;  Surgeon: Geronimo Ye MD;  Location: [facility] OR;  Service: General;  Laterality: N/A;    INCISION AND DRAINAGE PERIRECTAL ABSCESS N/A 08/14/2020    Procedure: INCISION AND DRAINAGE OF retrorectal dehiscence abcess with drain placement and irrigation;  Surgeon: Geronimo Ye MD;  Location: [facility] OR;  Service: General;  Laterality: N/A;    PAP SMEAR N/A 01/24/2014    SIGMOIDOSCOPY N/A 07/24/2019    INFILTRATIVE PARTIALLY OBSTRUCTING LARGE RECTAL CANCER, AREA TATOOED, DR. GERONIMO YE AT [facility]    SIGMOIDOSCOPY N/A 11/23/2019    AN ULCERATED PARTIALLY OBSTRUCTING MASS IN MID RECTUM, AREA TATTOOED, DR. GERONIMO YE AT [facility]    SIGMOIDOSCOPY N/A 07/22/2021    PATENT ANASTAMOSIS IN DISTAL RECTUM, RESCOPE IN 1 YR, DR. GERONIMO YE AT [facility]    SIGMOIDOSCOPY N/A 09/11/2024    PATCHY INFLAMMATION AND EDEMA IN DESCENDING, AREA BIOPSIED AND WAS BENIGN, PATENT AND HEALTHY ANASTAMOSIS, HEMORRHOIDS,RESCOPE(CY) 12/2025, DR. GERONIMO YE AT [facility]    TRANSRECTAL ULTRASOUND N/A 07/24/2019    Procedure: ULTRASOUND TRANSRECTAL;  Surgeon: Geronimo Ye MD;  Location: [facility] ENDOSCOPY;  Service: General    URETEROSCOPY LASER LITHOTRIPSY WITH STENT INSERTION Right 10/30/2019    DR. ESTUARDO BEASLEY AT [facility]    VAGINAL DELIVERY N/A 09/18/1998    VENOUS ACCESS DEVICE (PORT) INSERTION Left 08/09/2019    Procedure: INSERTION VENOUS ACCESS DEVICE;  Surgeon: Sj Joseph MD;  Location: [facility];  Service: General    VENOUS ACCESS DEVICE (PORT) INSERTION N/A 12/18/2020    Procedure: INSERTION VENOUS ACCESS DEVICE right side, removal venous access device left side;  Surgeon: Sj Joseph MD;  Location:   [facility];  Service: General;  Laterality: N/A;    WISDOM TOOTH EXTRACTION Bilateral 1993               Anthropometrics        Current Height Ht Readings from Last 1 Encounters:   04/09/25 165.1 cm (65\")      Current Weight Wt Readings from Last 1 Encounters:   04/09/25 81.3 kg (179 lb 4.8 oz)      BMI  29.5   Usual Body Weight (UBW) 200lb 6 months ago   Weight Change/Trend Loss   Weight History Wt Readings from Last 30 Encounters:   04/09/25 1139 81.3 kg (179 lb 4.8 oz)   03/21/25 1048 81.7 kg (180 lb 3.2 oz)   03/19/25 0824 81.6 kg (180 lb)   03/05/25 0851 82.1 kg (181 lb)   02/13/25 1345 80.4 kg (177 lb 3.2 oz)   01/29/25 1131 82.8 kg (182 lb 9.6 oz)   01/15/25 1043 86 kg (189 lb 8 oz)   12/31/24 0927 85.8 kg (189 lb 3.2 oz)   12/18/24 0910 85.4 kg (188 lb 3.2 oz)   12/10/24 1148 85 kg (187 lb 8 oz)   12/04/24 1017 85.2 kg (187 lb 12.8 oz)   11/13/24 0919 86.7 kg (191 lb 3.2 oz)   11/08/24 1345 87 kg (191 lb 11.2 oz)   10/30/24 0926 86.5 kg (190 lb 9.6 oz)   10/22/24 0518 87.1 kg (192 lb)   10/16/24 0934 89 kg (196 lb 1.6 oz)   10/02/24 1019 87 kg (191 lb 14.4 oz)   09/18/24 0830 87.1 kg (192 lb)   09/11/24 1118 88 kg (194 lb)   09/04/24 0856 90.8 kg (200 lb 3.2 oz)   08/30/24 1203 89.5 kg (197 lb 4.8 oz)   08/29/24 1230 87.7 kg (193 lb 6.4 oz)   08/28/24 1429 87 kg (191 lb 14.4 oz)   08/21/24 0840 89.8 kg (198 lb)   08/16/24 1529 90.2 kg (198 lb 12.8 oz)   08/14/24 1253 92.5 kg (204 lb)   07/08/24 1111 92.5 kg (204 lb)   06/26/24 0831 92.6 kg (204 lb 3.2 oz)   05/03/24 1448 92.3 kg (203 lb 6.4 oz)   05/01/24 1001 91.4 kg (201 lb 6.4 oz)          Medications           Current medications: Cyanocobalamin, HYDROcodone-acetaminophen, Hydrocortisone (Perianal), Loperamide HCl, Loratadine, Syringe/Needle (Disp), bismuth subsalicylate, clonazePAM, dicyclomine, diphenoxylate-atropine, enoxaparin sodium, escitalopram, famotidine, hydrocortisone, metoprolol succinate XL, nystatin, nystatin-zinc " "oxide-hydrocortisone-bacitracin, octreotide, ondansetron, pantoprazole, and rosuvastatin                 Tests/Procedures        Tests/Procedures Chemotherapy     Labs       Pertinent Labs    Results from last 7 days   Lab Units 04/09/25  1116   SODIUM mmol/L 139   POTASSIUM mmol/L 4.0   CHLORIDE mmol/L 103   CO2 mmol/L 22.5   BUN mg/dL 6   CREATININE mg/dL 0.59   CALCIUM mg/dL 8.7   BILIRUBIN mg/dL 0.2   ALK PHOS U/L 92   ALT (SGPT) U/L 15   AST (SGOT) U/L 16   GLUCOSE mg/dL 147*     Results from last 7 days   Lab Units 04/09/25  1125 04/09/25  1116   HEMOGLOBIN g/dL  --  13.9   HEMATOCRIT %  --  43.1   WBC 10*3/mm3  --  6.04   TRIGLYCERIDES mg/dL 129  --    ALBUMIN g/dL  --  3.4*     Results from last 7 days   Lab Units 04/09/25  1116   PLATELETS 10*3/mm3 184     COVID19   Date Value Ref Range Status   12/22/2020 Not Detected Not Detected - Ref. Range Final     No results found for: \"HGBA1C\"       Physical Findings        Physical Appearance alert, oriented     Edema  no edema   Gastrointestinal diarrhea, stool frequency   Tubes/Lines/Drains Implantable Port   Oral/Mouth Cavity WNL   Skin Intact       Estimated/Assessed Needs        Energy Requirements    Height for Calculation      Weight for Calculation 82 kg   Method for Estimation  25 kcal/kg   EST Needs (kcal/day) 2050 kcals per day       Protein Requirements    Weight for Calculation 82 kg   EST Protein Needs (g/kg) 1.2 gm/kg   EST Daily Needs (g/day) 98 gms per day       Fluid Requirements     Method for Estimation 1 mL/kcal    Estimated Needs (mL/day) 2000 mLs per day         NUTRITION INTERVENTION / PLAN OF CARE      Intervention Goal(s) Maintain nutrition status, Reduce/improve symptoms, Provide information, Meet estimated needs, Disease management/therapy, Tolerate PO , Maintain intake, and Maintain weight         RD Intervention/Action Encouraged intake, Follow Tx progress, Recommended/ordered         Recommendations:       PO Diet Consume calorie and " protein dense soluble fiber healthy foods, 6 small meals      Supplements Boost or Ensure if needed      Snacks       Other Enterade qd when having diarrhea   New Prescription Ordered? No, recommend   --      Monitor/Evaluation Follow up as needed   Education Education provided, Will provide eduction as needed   --    Electronically signed by:  Kelley Gilman RD  04/09/25 14:52 EDT

## 2025-04-09 NOTE — PROGRESS NOTES
REASON FOR FOLLOW UP:     *. Rectal cancer, rectal ultrasound examination in July 2019 reported T3N0 disease without lymphadenopathy. She does have small pulmonary nodule 6-7 mm and 2 mm with indeterminate feature. There is no solid evidence of metastatic disease otherwise. Patient has stage IIA (T3N0M0) disease.  Post neoadjuvant chemoradiation therapy, patient underwent surgical resection of the primary rectal cancer by Dr. Ye on 12/2/2019.  Pathology evaluation reported residual T3N1 disease stage IIIb.   PET scan examination on 9/18/2020 reported multiple hypermetabolic small pulmonary nodules/ new pulmonary nodules.   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 It reported tumor mutation burden 5 Muts/Mb, microsatellite stable, TP53 mutation R282W, and others.   Patient had a telemedicine evaluation at the [facility] [place] in February 2021.  They agreed with our treatment plan for palliative chemotherapy followed by maintenance chemotherapy.     *.The patient is heterozygous factor V Leiden, was on prophylactic anticoagulation with Lovenox 40 mg daily given her increased risk of thrombosis with MediPort and GI malignancy.   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  Patient was switched to weight-based Lovenox injection.      HISTORY OF PRESENT ILLNESS:  The patient is a 44 y.o. female with the above-mentioned history, who is seen today for evaluation.     History of Present Illness  The patient presented today on 4/9/2025 for evaluation prior to her maintenance chemotherapy with 5-FU, leucovorin, and bevacizumab.    This visit is 1 week delayed due to the fact that on day of appointment last week for chemotherapy, her dog had a seizure, so she needs to take the dog to the .    She reports experiencing an episode of bowel movement  characterized by the passage of large, semi-formed stools over a period of 2 days. This episode exacerbated her hemorrhoidal condition, causing significant discomfort. Despite not consuming any food during this period, she continued to experience bowel movements.  She continues to use octreotide due to chemotherapy induced diarrhea.      She also mentions a recent weight loss, which she attributes to decreased food intake due to fear of diarrhea and subsequent sleep disturbances. She has been incorporating more protein into her diet. She has lost approximately 50 to 60 pounds over the past 5 years, with her recent weight peaking at 189 pounds in January 2025. Her current weight is 179 pounds.    She reports that her ribs fracture has subsided, with the last one healing about a week ago. She has had 7 rib fractures in total, with 1 reinjury. The fractures occurred from various incidents, including falling down the stairs and landing hard, twisting over to get something off her bedside table, or leaning down to  objects.    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    Results  Laboratory Studies on 4/9/2025  CBC: Hemoglobin 13.9, platelets 184,000, WBC 6040, neutrophils 4300.  Liver function panel: Normal.  Glucose: 147.  Renal function: Normal, creatinine 0.59.  Electrolytes: Normal, calcium 8.7.  Albumin: Marginally low at 3.4.      Past Medical History:   Diagnosis Date    Abdominopelvic abscess 08/12/2020    ADMITTED TO [facility]    Abnormal Pap smear of cervix 02/02/1998    JULIUS I    Abscess of abdominal wall 11/28/2012    SEEN AT [facility]    Anemia in pregnancy     Anxiety     Bilateral epiphora 04/2020    Bilateral hand swelling 05/02/2020    SEEN AT [facility]    Cervical lymphadenitis 06/06/2012    SEEN AT [facility]    Chemotherapy induced diarrhea 01/2021    Chemotherapy induced neutropenia 04/2020    Chemotherapy-induced nausea 02/2020    Chemotherapy-induced thrombocytopenia 05/2020    Chronic  anticoagulation     Chronic diarrhea     Colon polyps     FOLLOWED BY DR. 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 ALEXANDRU HUNT    History of gestational diabetes     History of pre-eclampsia 1998    History of pre-eclampsia     History of radiation therapy 2019    FOLLOWED BY DR. JAVON LEWIS    History of TB skin testing 01/17/2009    TB Skin Test    History of TB skin testing 01/17/2009    TB Skin Test    History of transfusion 2019    12/2019    HPV in female 1998    Hypokalemia 09/2019    Hypomagnesemia 09/2019    Hyponatremia 06/2021    IBS (irritable bowel syndrome)     Ileostomy present 04/25/2020    Take down on 11/3/2022    Infected insect bite of neck 05/27/2016    SEEN AT [facility]    Kidney stone on right side 10/07/2019    Kidney stones 08/09/2007    SEEN AT [facility] ER    Low anterior resection syndrome 12/2022    FOLLOWED BY DR. 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    Perirectal abscess 08/12/2020    Perirectal abscess 08/12/2020    Added automatically from request for surgery [id number]      Pulmonary embolism without acute cor pulmonale 09/20/2019    X 3; ADMITTED TO [facility]    Pulmonary  nodule, right 2020    Rectal cancer 2019    STAGE IIA, INVASIVE MODERATELY DIFFERENTIATED ADENOCARCINOMA, CHEMO AND XRT FINISHED 2019    Right shoulder pain 2020    SEEN AT [facility] ER    Right ureteral stone 10/01/2019    SEEN AT [facility] ER    Right ureteral stone 10/01/2019    SEEN AT [facility] ER      SAB (spontaneous ) 1996     A2-1 INDUCED    Sciatica     Sepsis due to cellulitis 2002    LEFT GREAT TOE, ADMITTED TO [facility]    Tachycardia 2020    Thrombosis of superior vena cava 2020    AND BRACHIOCEPHALIC VEIN, ADMITTED TO [facility]    Urinary urgency 2020   Patient had COVID-19 infection diagnosed on 2022 despite was fully vaccinated and boosted.  She recovered without problem.      Past Surgical History:   Procedure Laterality Date    ABDOMINAL SURGERY      CHOLECYSTECTOMY N/A 10/10/2003    LAPAROSCOPIC WITH CHOLANGIOGRAM, DR. JAMEY TALAVERA AT [facility]    COLON RESECTION N/A 2019    Procedure: laparoscopic low anterior colon resection with mobilization of splenic flexure and diverting loop ileostomy: ERAS;  Surgeon: Padmaja Esparza MD;  Location: [facility];  Service: General    COLON RESECTION N/A 2020    Procedure: LOW ANTERIOR COLON RESECTION, COLOANAL ANASTOMOSIS, MOBILIZATION SPLENIC FLEXURE;  Surgeon: Padmaja Esparza MD;  Location: [facility] OR;  Service: General;  Laterality: N/A;    COLONOSCOPY N/A 07/15/2020    PATENT ANASTAMOSIS IN MID RECTUM, RESCOPE IN 1 YR, DR. PADMAJA ESPARZA AT [facility]    COLONOSCOPY N/A 2022    DIFFUSE AREA OF MODERATELY FRIABLE MUCOSA IN ENTIRE COLON , DIVERSION COLITIS, PATENT AND HEALTHY ANASTAMOSIS, DR. PADMAJA ESPARZA AT [facility]    COLONOSCOPY N/A 2023    6 MM SESSILE SERRATED ADENOMA POLYP IN DESCENDING, PATENT ANASTAMOSIS IN DISTAL RECTUM, RESCOPE IN 2 YRS, DR. PADMAJA ESPARZA AT [facility]    COLONOSCOPY W/ POLYPECTOMY N/A 2019    15 MM TUBULOVILLOUS ADENOMA POLYP IN HEPATIC FLEXURE, 20 MMTUBULOVILLOUS ADENOMA WITH HIGH  GRADE DYSPLASIA IN RECTOSIGMOID, 4 CM MASS IN MID RECTUM, PATH: INVASIVE MODERATELY DIFFERENTIATED ADENOCARCINOMA, DR. JENNIFER LI AT [facility]    DILATATION AND EVACUATION N/A 2009    ENDOSCOPY N/A 07/08/2019    LA GRADE A ESOPHAGITIS, GASTRITIS, ALL BIOPSIES BENIGN, DR. JENNIFER LI AT [facility]    ILEOSTOMY CLOSURE N/A 11/14/2022    Procedure: ILEOSTOMY TAKEDOWN;  Surgeon: Geronimo Ye MD;  Location: [facility] OR;  Service: General;  Laterality: N/A;    INCISION AND DRAINAGE PERIRECTAL ABSCESS N/A 08/14/2020    Procedure: INCISION AND DRAINAGE OF retrorectal dehiscence abcess with drain placement and irrigation;  Surgeon: Geronimo Ye MD;  Location: [facility] OR;  Service: General;  Laterality: N/A;    PAP SMEAR N/A 01/24/2014    SIGMOIDOSCOPY N/A 07/24/2019    INFILTRATIVE PARTIALLY OBSTRUCTING LARGE RECTAL CANCER, AREA TATOOED, DR. GERONIMO YE AT [facility]    SIGMOIDOSCOPY N/A 11/23/2019    AN ULCERATED PARTIALLY OBSTRUCTING MASS IN MID RECTUM, AREA TATTOOED, DR. GERONIMO YE AT [facility]    SIGMOIDOSCOPY N/A 07/22/2021    PATENT ANASTAMOSIS IN DISTAL RECTUM, RESCOPE IN 1 YR, DR. GERONIMO YE AT [facility]    SIGMOIDOSCOPY N/A 09/11/2024    PATCHY INFLAMMATION AND EDEMA IN DESCENDING, AREA BIOPSIED AND WAS BENIGN, PATENT AND HEALTHY ANASTAMOSIS, HEMORRHOIDS,RESCOPE(CY) 12/2025, DR. GERONIMO YE AT [facility]    TRANSRECTAL ULTRASOUND N/A 07/24/2019    Procedure: ULTRASOUND TRANSRECTAL;  Surgeon: Geronimo Ye MD;  Location: [facility];  Service: General    URETEROSCOPY LASER LITHOTRIPSY WITH STENT INSERTION Right 10/30/2019    DR. ESTUARDO BEASLEY AT [place]    VAGINAL DELIVERY N/A 09/18/1998    VENOUS ACCESS DEVICE (PORT) INSERTION Left 08/09/2019    Procedure: INSERTION VENOUS ACCESS DEVICE;  Surgeon: Sj Joseph MD;  Location: [facility];  Service: General    VENOUS ACCESS DEVICE (PORT) INSERTION N/A 12/18/2020    Procedure: INSERTION VENOUS ACCESS DEVICE right side, removal  venous access device left side;  Surgeon: Sj Joseph MD;  [place] [facility];  Service: General;  Laterality: N/A;    WISDOM TOOTH EXTRACTION Bilateral 1993       HEMATOLOGIC/ONCOLOGIC HISTORY:      Rectal cancer, rectal ultrasound examination in July 2019 reported T3N0 disease without lymphadenopathy. She does have small pulmonary nodule 6-7 mm and 2 mm with indeterminate feature. There is no solid evidence of metastatic disease otherwise. Patient has stage IIA (T3N0M0) disease.  The patient is heterozygous factor V Leiden, was on prophylactic anticoagulation with Lovenox 40 mg daily given her increased risk of thrombosis with MediPort and GI malignancy.   PET scan on 8/8/2019 reported an 8 mm hypermetabolic right upper lobe pulmonary nodule, which is suspicious for metastatic as well.    Patient had a PowerPort placement on the left upper chest by Dr. Joseph on 8/9/2019.  Patient was started on concurrent infusional 5-FU chemoradiation therapy on 8/12/2019, with planned complete surgical resection and further adjuvant chemotherapy with intention to cure the disease.   Patient underwent surgical resection of the primary rectal cancer by Dr. Ye on 12/2/2019.  Pathology evaluation reported residual T3N1 disease stage IIIb.  Diarrhea related to therapy and radiation.   Patient was started cycle 1 day 1 of adjuvant FOLFOX 6 on 1/23/2020.  On 2/5/2020 FOLFOX 6 cycle 2 delayed secondary to neutropenia.  Patient was given 3 days of Granix injection.  After cycle #2 we planned 3 days of Granix with each cycle.   Patient also intolerant of oral iron.  Patient received 2 doses of IV injectafer on 02/05/2020 and 02/12/2020.   02/12/2020 Proceed with cycle #2 of FOLFOX 6 with 3 days of Granix.  On 3/11/2020 cycle 4 postponed secondary to abdominal pain and occasional low-grade fevers.  CT scans ordered.  Cycle #4 resumed after CT scan revealed no evidence of disease.  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 Ye. patient has no fever.  Continue to observe.   Grade 3 hand-foot syndrome secondary to 5-FU after cycle #7 FOLFOX 6 chemotherapy, prompting ER visit.  Also has worsening peripheral neuropathy.   Cycle #8 FOLFOX 6 was given on 5/13/2020, with 50% dose reduction for both 5-FU and oxaliplatin, and also examination of bolus 5-FU.   PET scan examination on 5/21/2020 reported no evidence of metastatic disease in the chest abdomen pelvis.  Stable 6 mm RUL pulmonary nodule.  On 5/27/2020, I discussed with the patient that we can discontinue chemotherapy at this time.   Patient had a surgical procedure for low anterior colon resection, coloanal anastomosis on 8/3/2020.  CT scan for chest abdomen pelvis on 9/9/2020 reported a new 8 mm noncalcified pulmonary nodule in the left lower lobe of the lung.  Otherwise stable right upper lobe 6 mm pulmonary nodule, and no evidence of disease recurrence in the abdomen or pelvis.  PET scan examination on 9/18/2020 reported multiple hypermetabolic small pulmonary nodules/ new pulmonary nodules.   Patient was started on pelvic chemotherapy with FOLFIRI regimen on 10/13/2020.   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 It reported tumor mutation burden 5 Muts/Mb, microsatellite stable, TP53 mutation R282W, and others.   Cycle #5 was without irinotecan, due to peripheral neuropathy.  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  Patient was switched to weight-based Lovenox injection.  Cycle #6 5-FU and irinotecan was delayed by 2 weeks because of the above incident.  Patient had a telemedicine evaluation at that the [facility] in February 2021.  They agreed with our treatment plan for palliative chemotherapy followed by maintenance chemotherapy.    PET scan  examination on 4/6/2021 after cycle #12 palliative chemotherapy reported no evidence of hypermetabolic metastatic lesion.   Patient was started on maintenance chemotherapy with 5-FU and Avastin on 4/13/2021. (Unable to tolerate Xeloda because of a significant hand-foot syndrome).   PET scan on 9/24/2021 obtained after cycle #12 maintenance chemotherapy with 5-FU, leucovorin, Avastin reported no evidence for active disease. We recommended 3 months chemotherapy holiday.  CT scan examination on 3/15/2022 reported slightly disease progression with increase in size of small pulmonary nodule.  We started patient back on maintenance chemotherapy on 3/29/2022 treatment with 5-FU plus leucovorin and Avastin, repeating every 2 weeks.  After 6 more maintenance chemotherapy, CT scan for chest abdomen pelvis was done on 6/21/2022 which reported stable sub-6 mm pulmonary nodules.  Patient had last maintenance chemotherapy on 6/7/2022.   Disease progression, patient was restarted back on chemotherapy with 5-FU leucovorin and bevacizumab 8/21/2024    The patient reports she has intermittent rectal bleeding and urgency, mucous with her stool, starting sometime in 2016. At that time she was referred to GI service, and was diagnosed of irritable bowel syndrome. Nevertheless she had worsening urgency for bowel movement, and had a couple of incidents losing control of stool. She was recently seen by Roland Thorpe MD again and had colonoscopy and EGD exam on 07/08/2019. She was found having a circumferential rectal mass. According to the procedure note, the patient had a fungating circumferential bleeding 4 cm mass in the middle rectum, at distance between 13 cm and 17 cm from the anus. Mass was causing partial obstruction. There were also colon polyps found at the hepatic flexure and also at the rectosigmoid junction 23 cm from the anus. Both were resected and retrieved. EGD examination reported grade A esophagitis at the GE junction and  patchy discontinuous erythema and aggravation of the mucosa without bleeding in the stomach body. There is normal mucosa of the duodenum. Pathology evaluation from this procedure reported moderately differentiated adenocarcinoma involving the rectal mass. The rectal sigmoid junction polyp was tubular/tubulovillous adenoma with high grade dysplasia negative for invasive malignancy. The hepatic flexure polyp was also tubular/tubulovillous adenoma negative for high grade dysplasia or malignancy. The biopsy from the esophagus reports squamocolumnar mucosa with inflammatory changes suggestive of mild reflux esophagitis, negative for interstitial metaplasia. Gastric biopsy was benign and duodenal biopsy was also benign. There is no mention of H-pylori.     Patient was subsequently referred to colorectal surgeon Padmaja Ye MD for further evaluation. The patient had CT scan examination for chest, abdomen and pelvis requested by Dr. Ye and were done on 07/13/2019. The study reported no evidence of lymphadenopathy in the abdomen and pelvis. There was wall thickening of the rectosigmoid junction. Normal spleen, pancreas, adrenal glands and kidneys. There was fatty liver infiltration but no focal lymphatic lesions. There was a small 6-7 mm noncalcified nodule in the right upper lobe and a tiny 3 mm subpleural nodule in the right middle lobe. No mediastinal or hilar lymphadenopathy.     Dr. Ye performed staging rectal ultrasound examination. This study reported tumor penetrating through the muscularis propria as T3 disease; there were no lymph nodes identified.    She had a hospitalization in late September 2019 because of newly discovered pulmonary emboli.  She was on prophylactic Lovenox prior to the incident of PE.  Now she is on full dose Xarelto anticoagulation.  Patient reports no further chest pain dyspnea.  She denies bleeding or bruising.  During her hospitalization, she was seen by Dr. Ye, who plans to have  surgery 8 to 12 weeks after finishing radiation therapy.  She finished her radiation on 9/19/2019.     I noticed patient also presented to the emergency room on 10/1/2019 complaining of right flank area pain.  I reviewed the images studies and indeed she has a very small 1 to 2 mm obstructing kidney stone in the UV junction.  Patient is still symptomatic with some pains and dysuria.  She denies fever sweating or chills.    Laboratory study on 10/7/2019 reported normal CBC including hemoglobin 13.1, platelets 301,000, WBC 6170 and ANC 4900 lymphocytes 590 monocytes 480.      The nurse reported malfunction of port-a-catheter on 10/7/2019.  Port study on 10/8/2019 showed fibrin sheath around the distal aspect of the Mediport catheter in the SVC. This does not appear to hinder injection, but does prevent aspiration at this time.    Patient underwent surgical resection of the colon on 12/2/2019 with Dr. Ye.  Pathology evaluation reported residual T3 disease, also 1 out of 14 lymph nodes positive for malignancy.  So this patient in either had at least stage IIIb disease (T3 N1 M0?).      Adjuvant chemotherapy FOLFOX 6 will be started on 1/22/2020.  Laboratory study reported iron saturation 10%, free iron 31 TIBC 319 and ferritin 168.  Her hemoglobin was 11.8, WBC 5600, and platelets 347,000.    Patient was here on 02/12/2020 for cycle 2 of her FOLFOX.  This is delayed x1 week secondary to neutropenia.  The patient ultimately received 3 days worth of Neupogen with recovery of her blood counts.  Of note, the patient struggled with significant nausea without any episodes of vomiting following cycle #1 of chemotherapy resulting in significant weight loss.  She is up 12 pounds over the last week as her appetite has normalized.  We will add Emend to her care plan.    She is having several loose stools per her colostomy and has been hesitant to take Imodium due to prior history of constipation.  I encouraged her to try this  with a maximum of 8 tablets/day.  She denies any infectious symptoms including fevers or chills.  No excess bleeding or bruising noted.  She had the expected cold sensitivity related to the Oxaliplatin for about 3 days following treatment.    Labs from 02/12/2020 demonstrated total white blood cell count of 5.14, ANC of 3.06, hemoglobin of 11.2, platelets of 211,000.  She was found to be iron deficient last week and is intolerant to oral iron secondary to GI distress.  For this reason, she initiated IV iron therapy with Injectafer last week.  She had received her second dose 02/12/2020    Patient has normalized hemoglobin 12.2 on 2/26/2020.    She reported on 5/5/2020 she had a recent visit to the emergency department for what was suspected to be an allergic reaction.  She called our on-call service on Saturday with reports of hand and face swelling.  She was instructed to proceed to the emergency department at which time she was assessed and prescribed a Medrol Dosepak.  She had just completed her 5-FU and was unhooked on Friday, 5/3/2020.  Her symptoms have improved.  She does report persistent hyperpigmentation and mild swelling of the palms of the hands but this is much improved.  It was her right hand specifically that was swollen.  Her facial swelling has resolved.  She continues on Cefdinir nd since has 1 day remaining, she was prescribed cefdinir for a UTI requiring hospitalization at the end of April.  Reports no new symptoms.  Her labs are stable.  She is scheduled for treatment again.    Patient states at this time she is not tolerating her chemotherapy well.     Patient seen in the emergency department on 5/2/2020 for what was suspected to be an allergic reaction.  She called our on-call service on Saturday explaining that she was experiencing hand and face swelling.  She was instructed to proceed to the emergency department at which time she was assessed and prescribed a Medrol Dosepak.  She had just  completed her 5-FU and was unhooked on Friday, 5/1/2020.      She reports since her ED visit on 5/2/2020 her symptoms have not improved. Her hands and feet were swollen upon presenting to the ED and she could barely make a fist. She states that she feels so swollen she is not able to stand it. She states that her skin on the hands and feet are peeling extensively as well, besides changing color to more dark.     She also reports significant fatigue after her first week of the 5-FU treatment but she expected this side effect. She also notices significant increase in her neuropathy to the point that she is not able to even walk around in her home without her house slippers due to irritation from her rugs. She denies and associated nausea or vomiting at this time. She also has episodes of epistaxis every day after the chemotherapy cycle #7.  She does report working full-time during the week of chemotherapy.    Laboratory studies, 5/13/2020, show moderate thrombocytopenia platelets 123,000, low normal WBC 4140 including ANC 2720 and normal hemoglobin 13.4.  Because significant hand-foot syndrome, will decrease both 5-FU and oxaliplatin by 50%, and eliminate bolus dose of 5-FU.    On 5/21/2020 patient had a PET scan performed which showed no convincing evidence of residual disease in abdomen or pelvis, no metastatic disease within the chest or neck.     Cycle #8 FOLFOX 6 was given on 5/13/2020, with 50% dose reduction for both 5-FU and oxaliplatin, and also examination of bolus 5-FU.  She states on 5/27/2020 that with the recent reduction of the chemotherapy she feels significantly better. She has more energy and is able to do her daily routine.      PET scan examination on 5/21/2020 reported no evidence of metastatic disease in chest abdomen pelvis.  There was a stable 6 mm right upper lobe pulmonary nodule.    Laboratory studies on 5/27/2020 showed mild leukocytopenia WBC 3720 but a normal ANC 2250 and lymphocytes 630.   Normal platelets 163,000 and hemoglobin 12.6.  Chemistry lab reported normal renal function, liver function panel and a electrolytes, elevated glucose 164.    Laboratory studies 6/24/2020, showed normal hemoglobin 13.4 but macrocytosis .9.  Normal platelets 210,000 and WBC 5870.  She had normal CMP.     Patient last time was here in late June 2020.  Since that time she had surgical procedure for low anterior colon resection, coloanal anastomosis on 8/3/2020.  She later developed a perirectal abscess, required surgical drainage on 8/14/2020.  She was discharged on 8/27/2020.    Patient reports to me she still has lower abdominal wall vacuum suction in place.  She denies fever sweating chills.  Performance status is ECOG 1.  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  She does have visiting nurse come to the home for wound care.    CT scan for chest abdomen pelvis on 9/9/2020 reported a new 8 mm noncalcified pulmonary nodule in the left lower lobe.  Otherwise stable right upper lobe 6 mm pulmonary nodule, and no evidence of disease recurrence in the abdomen or pelvis.     Laboratory study on 9/16/2020 reported elevated AST 55, ALT 95, alk phosphatase 143 but normal total bilirubin 0.3.  Chemistry lab otherwise unremarkable.  She has completed normal CBC.      Because of the abnormal CT scan examination for chest abdomen pelvis on 9/9/2020 reported a new 8 mm noncalcified pulmonary nodule in the left lower lobe, we obtained a PET scan examination for further evaluation, which was done on 9/18/2020.  This study reported several pulmonary nodules, some of them are hypermetabolic, newly developed compared to previous PET scan in May 2020.  Certainly does highly suspicious for metastatic disease.  There are also hypermetabolic activity in the abdomen and pelvic area which is hard to differentiate from surgical scar versus metastatic malignancy.    Laboratory study on 10/13/2020 reported  normal CBC with Hb 13.9, platelets 302,000 and WBC 5520 including ANC 3830.  Chemistry lab reported normalized AST 20, alk phosphatase 116 improved ALT 35, and maintains normal bilirubin, with normal electrolytes and liver function panel.     Patient was started on first cycle of palliative chemotherapy FOLFIRI on 10/13/2020.    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  Patient had a new port placed 12/18/2020, and had held her Xarelto for 2 days prior to surgery.  She presented to the ER on 12/21/20 with complaints of facial and arm swelling for 3 days.  She also noted increased shortness of breath.  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  Thrombus within the right internal jugular vein cannot be excluded.  Patient was started on IV heparin, and eventually transitioned to weight-based Lovenox, which she now continues.    PET scan examination on 9/24/2021 reported further shrinking of the tiny right upper lobe pulmonary nodule.  Otherwise no new lesions.  No evidence for metastatic disease in other areas.      Patient had CT scan for chest with IV contrast obtained on 12/14/2021 which reported small tiny stable pulmonary nodules. There is a 4 mm right upper lobe pulmonary nodule. There is also a stable 4 mm left lower lobe pulmonary nodule. There is also stable left upper lobe micronodule.     Laboratory studies today on 12/21/2021 reported normal hemoglobin 15.9 however .0, platelets 218,000 WBC 5360 including neutrophils 3730 lymphocytes 980. Chemistry lab reported normal renal function, electrolytes, glucose, and marginally elevated ALT 55, AST 34 but normal total bilirubin and alk phosphatase.     CT scan for chest abdomen pelvis 6/21/2022 reported sub-6 mm pulmonary nodules either stable or slightly smaller.   No new pulmonary nodules or evidence for disease progression.  There is no evidence for metastatic disease in the liver however it shows diffuse hepatic steatosis.  There was masslike thickening in the presacral space with the clips or calcification approximately 1.7 cm in greatest AP dimension but stable.    Laboratory study on 9/20/2022 reported normal hemoglobin 15.7 with mild macrocytosis .1.  She has normal CBC and platelets.  She also has unremarkable CMP.  Normal CEA 1.13 ng/mL.    Patient was seen by Dr. Ye, who performed ileostomy takedown on 11/14/2022.  Patient reports that she is recovering.  She still has a small open wound less than 1 cm in diameter, however close to 2 cm deep.  She has been changing dressing herself.  She denies fever sweating chills.    Patient has no other specific complaints.  She has excellent performance status ECOG 0.  She denies chest pain dyspnea cough hemoptysis.  No abdominal pain.  No melena hematochezia.  Patient has been eating well, stable weight.  She works full-time.    She continues Lovenox injections and denies any significant bleeding issues.   No other new problems or concerns.     CT scan for chest abdomen pelvis obtained on 1/10/2023 reported stable small pulmonary nodules, no evidence for active or new disease.    Laboratory study on 1/10/2023 reported normal CBC and normal CMP.  CEA level is 0.99 ng/mL.    CT scan for chest, abdomen, and pelvis on 7/7/2023 reported stable small pulmonary nodules including a left upper lobe 5 mm nodule. There were no new masses, or pleural effusion. No enlarged supraclavicular, axillary, mediastinal, or hilar lymphadenopathy. Mediastinal vasculature normal. There is occlusion of the superior vena around the catheter with body wall  is present. There was no evidence for disease recurrence in the abdomen or pelvis. Bone is also negative for metastatic disease.    Laboratory studies obtained on 07/07/2023 also  reported normal CBC, and normal CMP as well as low level CEA 1.07 ng/mL.    The CT scan for the chest on 10/27/2023 reported enlarging pulmonary nodules bilaterally. The right upper lobe lung nodule increased from 7 x 7 mm to 9 x 8 mm, and the left upper lobe nodule measured 8 x 9 mm from 5 x 6 mm in 04/2023. There is a different left upper lobe nodule 6 x 8 mm from previous 5 x 5 mm. The presacral tissue thickness measures up to 2.3 cm.    A laboratory study on 10/27/2023 reported CEA 1.58 ng/mL, normal CBC, and CMP.  Molecular study of peripheral blood Guardant Reveal is still pending.    The patient presents today on 11/17/2023 for reevaluation to discuss the results of PET scan examination as well as the results of tumor conference. I saw her recently on 11/03/2023 and because of enlarging pulmonary nodules on the CT scan, we requested a PET scan examination. I presented her case yesterday on 11/16/2023 at the [facility].    The patient reports she is at her baseline condition as 2 weeks ago. No specific complaints, no chest pain, dyspnea, cough, etc. She has a regular bowel movement.    Her case was present at the [facility]. on 11/16/2023. The PET scan images and chest CT scan were reviewed in conjunction with her treatment history. The consensus was for patient to receive stereotactic radiation therapy for the right upper lobe lung lesion, and the bigger left upper lobe lesion, and monitor the smaller left upper lobe lesion with further images studies down the line. Also, the conference recommended Guardant Reveal study which we already ordered.     This Guardant Reveal study came back today as negative for ctDNA 0%.      CT of the chest on 2/2/2024 reported shrinking of the right upper lobe lesion from 9 mm to 6 mm, and the left upper lobe lesion also decreased from 9 mm to 6 mm. Otherwise, there are a couple of stable pulmonary nodules, 7 to 8 mm. The right hilar has  a small lymph node that has increased from 6 mm to 8 mm and is otherwise stable. There was no suspicious lesion in the abdomen or pelvis.    Colonoscopy examination 9/11/2024 showed patchy mild inflammation characterized by congestion/edema in the descending colon. Evidence of previous endo coloanal anastomosis in the rectum. Presence of hemorrhoids.      MEDICATIONS    Current Outpatient Medications:     bismuth subsalicylate (PEPTO BISMOL) 262 MG/15ML suspension, Take 15 mL by mouth 4 (Four) Times a Day., Disp: , Rfl:     clonazePAM (KlonoPIN) 1 MG tablet, Take 1 tablet as needed daily for anxiety. May take one additional as needed for severe anxiety., Disp: 45 tablet, Rfl: 3    Cyanocobalamin (VITAMIN B-12 PO), Take 1 tablet by mouth 3 (Three) Times a Week. M-W-F, Disp: , Rfl:     dicyclomine (BENTYL) 20 MG tablet, TAKE 1 TABLET BY MOUTH EVERY 6 (SIX) HOURS AS NEEDED FOR IRRITABLE BOWEL SYMPTOMS, Disp: 360 tablet, Rfl: 2    diphenoxylate-atropine (LOMOTIL) 2.5-0.025 MG per tablet, TAKE 2 TABLETS BY MOUTH 4 TIMES A DAY, Disp: 240 tablet, Rfl: 5    enoxaparin sodium (LOVENOX) 100 MG/ML solution prefilled syringe syringe, INJECT 1 ML UNDER THE SKIN INTO THE APPROPRIATE AREA AS DIRECTED EVERY 12 (TWELVE) HOURS., Disp: 60 mL, Rfl: 2    escitalopram (LEXAPRO) 10 MG tablet, Take 1 tablet by mouth Daily., Disp: 90 tablet, Rfl: 1    famotidine (PEPCID) 20 MG tablet, TAKE 1 TABLET BY MOUTH TWICE A DAY, Disp: 180 tablet, Rfl: 2    HYDROcodone-acetaminophen (NORCO) 5-325 MG per tablet, Take 2 tablets by mouth Every 6 (Six) Hours As Needed for Moderate Pain., Disp: 90 tablet, Rfl: 0    hydrocortisone 2.5 % cream, APPLY RECTALLY 3 TIMES DAILY. INCLUDE APPLICATOR., Disp: , Rfl:     Hydrocortisone, Perianal, (ANUSOL-HC) 2.5 % rectal cream, Apply rectally 3 times daily.  Include applicator., Disp: 30 g, Rfl: 1    Loperamide HCl (IMODIUM PO), Take  by mouth As Needed., Disp: , Rfl:     Loratadine 10 MG capsule, Take 1 capsule by  "mouth Every Evening., Disp: , Rfl:     metoprolol succinate XL (TOPROL-XL) 25 MG 24 hr tablet, TAKE 0.5 TABLETS BY MOUTH EVERY NIGHT, Disp: 45 tablet, Rfl: 2    nystatin (MYCOSTATIN) 670507 UNIT/GM cream, Apply 1 Application topically to the appropriate area as directed 2 (Two) Times a Day., Disp: 30 g, Rfl: 2    nystatin-zinc oxide-hydrocortisone-bacitracin, Apply topically to the appropriate area as directed Daily As Needed., Disp: 60 g, Rfl: 3    octreotide (sandoSTATIN) 100 MCG/ML injection, Inject 1 mL under the skin into the appropriate area as directed 3 (Three) Times a Day., Disp: 90 mL, Rfl: 2    ondansetron (ZOFRAN) 8 MG tablet, TAKE 1 TABLET BY MOUTH THREE TIMES A DAY AS NEEDED FOR NAUSEA AND VOMITING, Disp: 60 tablet, Rfl: 1    pantoprazole (PROTONIX) 40 MG EC tablet, TAKE 1 TABLET BY MOUTH EVERY DAY, Disp: 90 tablet, Rfl: 1    rosuvastatin (CRESTOR) 5 MG tablet, Take 1 tablet by mouth Daily., Disp: 90 tablet, Rfl: 0    Syringe/Needle, Disp, 27G X 1/2\" 1 ML misc, Use as directed for octreotide injections, Disp: 100 each, Rfl: 3    nystatin-zinc oxide-hydrocortisone-bacitracin, Apply 1 Application topically to the appropriate area as directed Daily As Needed (As needed). (Patient not taking: Reported on 4/9/2025), Disp: 60 g, Rfl: 2    ALLERGIES:   No Known Allergies    SOCIAL HISTORY:       Social History     Tobacco Use    Smoking status: Every Day     Current packs/day: 1.00     Average packs/day: 1 pack/day for 25.0 years (25.0 ttl pk-yrs)     Types: Cigarettes     Passive exposure: Current    Smokeless tobacco: Never    Tobacco comments:     1 PPD/caffeine use    Vaping Use    Vaping status: Some Days    Substances: Nicotine    Devices: Disposable    Passive vaping exposure: Yes   Substance Use Topics    Alcohol use: Not Currently     Comment: RARELY    Drug use: Never         FAMILY HISTORY:   Mother has positive factor V Leiden mutation, although she did not have thrombosis, mother also has " "coronary disease with stenting, she also has occasional bruising.    Maternal grandmother had DVT, she had multiple surgical procedures.    Patient mother's half-brother had metastatic colon cancer at diagnosis in his 50s.    Maternal great aunt had breast cancer.           Vitals:    04/09/25 1139   BP: 104/73   Pulse: 93   Temp: 97.4 °F (36.3 °C)   TempSrc: Oral   SpO2: 95%   Weight: 81.3 kg (179 lb 4.8 oz)   Height: 165.1 cm (65\")   PainSc: 0-No pain           4/9/2025    11:39 AM   Current Status   ECOG score 0     Physical Exam  Vital Signs  Weight is 179 pounds.    GENERAL:  Well-developed, well-nourished female in no acute distress.    SKIN:  Warm, dry without rashes, purpura or petechiae.  HEENT:  Normocephalic.   LYMPHATICS:  No cervical, supraclavicular or axillary adenopathy.  CHEST: Normal respiratory effort.  Lungs clear to auscultation. Good airflow.  CARDIAC:  Regular rate and rhythm. Normal S1,S2.  ABDOMEN:  Soft, no tender.  Bowel sounds normal.  EXTREMITIES:  No lower extremity edema.    RECENT LABS:  Results from last 7 days   Lab Units 04/09/25  1116   WBC 10*3/mm3 6.04   NEUTROS ABS 10*3/mm3 4.30   HEMOGLOBIN g/dL 13.9   HEMATOCRIT % 43.1   PLATELETS 10*3/mm3 184       Results from last 7 days   Lab Units 04/09/25  1116   SODIUM mmol/L 139   POTASSIUM mmol/L 4.0   CHLORIDE mmol/L 103   CO2 mmol/L 22.5   BUN mg/dL 6   CREATININE mg/dL 0.59   CALCIUM mg/dL 8.7   ALBUMIN g/dL 3.4*   BILIRUBIN mg/dL 0.2   ALK PHOS U/L 92   ALT (SGPT) U/L 15   AST (SGOT) U/L 16   GLUCOSE mg/dL 147*         IMAGING:    CT Chest With Contrast Diagnostic (01/31/2025 11:46)     Assessment & Plan      ASSESSMENT:   1.  Metastatic rectal cancer.   Initial rectal ultrasound examination reported T3N0 disease without lymphadenopathy.   CT scan of chest, abdomen and pelvis reported no lymphadenopathy in the abdomen, pelvis or chest. She does have small pulmonary nodule 6-7 mm and 2 mm with indeterminate feature. There is no " solid evidence of metastatic disease otherwise.   Based on the CT scan and rectal ultrasound imaging studies, this patient had stage IIA (T3N0M0) disease.   She had PET scan examination on 8/8/2019 which reported a hypermetabolic right upper lobe pulmonary nodule 6 mm with SUV 5.6.  This is suspicious for metastatic disease however it is too small to be biopsied.  This patient may have stage IV disease.   She initiated concurrent radiation with continuous 5-FU on 8/12/2019.  Patient finished concurrent chemoradiation therapy.  Patient underwent surgical resection of the rectal tumor and diverting loop ileostomy on 12/2/2019 with Dr. Ye.  Pathology evaluation reported residual T3 disease, with 1 out of 14 lymph nodes positive for malignancy.  Certainly this patient has at least stage IIIb rectal cancer (T3N1M0?)  On 1/22/2020 adjuvant chemotherapy FOLFOX 6 regimen initiated.   On 2/5/2022 cycle #2 was delayed secondary to neutropenia.  She was given 3 days of Granix.   Emend added with cycle 3.  With improved nausea control  Continuing home Granix x3 days following 5-FU disconnect  3/11/2020 due for cycle 4, however, she is experiencing progressive abdominal pain and occasional fevers.   CT scan performed on 3/13/2020 reveals no evidence of progressive or recurrent disease.  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  Patient hospitalized 4/24-4/26/20 after cycle 5 of chemotherapy with acute UTI.  CT abdomen/pelvis noting fluid collection in the presacral region having diminished in size compared to CT dated 3/13/2020.  Patient was evaluated by Dr. Ye while in the hospital with further plans to evaluate possible colovaginal fistula following completion of chemotherapy.  Patient did respond to IV antibiotics and discharged home on oral cefdinir.  4/29/2020 cycle 6 of FOLFOX.  Urinary symptoms have resolved   Patient seen in [facility] ED on 5/2/2020 for what was suspected to be an allergic reaction.  She  called our service on Saturday explaining that she was experiencing hand and face swelling.  She was instructed to proceed to the emergency department at which time she was assessed and prescribed a Medrol Dosepak.  She had just completed her 5-FU and was unhooked on Friday, 5/1/2020.  Her symptoms have resolved in the office on assessment, 5/5/2020.  The patient had grade 3 hand-foot syndrome based on symptomology.  Patient had cycle #8 of 5-FU on 5/13/2020. Due to her symptoms and poor tolerance to the 5-FU, her treatment dose will be reduced 50% for oxaliplatin and infusional 5-FU.  Bolus 5-FU will be discontinued..  On 5/21/2020 patient had a PET scan and it showed no evidence of of metastatic disease in the neck, chest, abdomen or pelvis.  There was fluid accumulation/abscess.   On 5/27/2020 discussed with the patient that we can discontinue chemotherapy at this time.  She will follow-up with Dr. Ye to discuss a possibility to reverse the ileostomy.  We likely will obtain anther PET scan in 3 to 4 months for reassessment.    Patient was seen by Dr. Ye on 6/19/2019 for evaluation and to discuss possible take down of her ileostomy.  She is scheduled to have a gastrografin enema on 7/2/2020 to evaluate for a fistula, and then a colonoscopy on 7/15/2020, both done by Dr. Ye.  She states that based on the above imaging and procedure findings, she may have a more extensive surgery done or just have her ileostomy reversed, which would likely be done in August 2020.  This will all be coordinated under the care of Dr. Ye.     CT scan from 09/09/2020 and also compared to her previous PET scan examination from 05/21/2020 and also the original PET scan from 08/09/2019.  The original PET scan there is a small right upper lobe pulmonary nodule 6 mm with SUV 5.6. So in the 05/2020 PET scan the nodule is still there but activity seems much less and this most recent CT scan examination also documented the  preexisting small pulmonary nodule however there is a new left lower lobe pulmonary nodule 8 mm in size and I shared with the patient today this nodule is suspicious for metastatic disease. Laboratory studies reported minimal CEA level 1.32 on 09/09/2020. Liver function panel was only marginally abnormal with the ALT 45, the remaining is normal. However laboratory study today showed worsening AST 55, ALT 95 and alkaline phosphatase 143 but is still normal, total bilirubin 0.3.   Recommended to have repeat PET scan examination for assessment because the left lower lobe pulmonary nodule is too small to be biopsied, and if the PET scan reports hypermetabolic lesion, I recommended to have stereotactic radiation therapy. Explained to patient that she is not a really good candidate to have thoracotomy for resection because she still has unhealed wound in the abdomen. Stereotactic radiation therapy will likely be a more feasible choice.   PET scan examination obtained on 9/18/2020 showed metastatic disease.  It reported a few hypermetabolic pulmonary nodules, and some new small pulmonary nodules, all of them highly suspicious for metastatic disease.  She also has hypermetabolic activity in the abdomen and pelvic area which could be related to scar tissue from her recent surgery versus metastatic disease.  Nevertheless overall picture fits with metastatic cancer.  Discussed with the patient on 9/20/2020, we reviewed the PET scan images together, and I recommended to have systematic chemotherapy, I do not think stereotactic reading therapy to the hypermetabolic lesions in the lungs warranted at this time because even those will be treated with reading therapy, she will still need systematic chemotherapy.  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  I did discuss with the patient using anti-EGFR monoclonal antibody versus anti-VEGF monoclonal antibody.     Palliative chemotherapy  cycle#1 FOLFIRI was started on 10/13/2020.  No bolus 5-FU given considering previous poor tolerance.    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  Microsatellite stable, mutation burden 5/Mb.   Discussed with the patient 10/27/2020, because of the K-s[place] mutation, she is not a candidate for anti-EGFR monoclonal antibody such as Erbitux or vectibix.  She could be a candidate for anti-VEGF monoclonal antibody, however because of active wound, she is not a candidate right now at this moment.  Patient seen in the ED for acute right neck pain on 11/16/2020.  CT angiogram as well as CT of the cervical spine performed with no acute findings but notably one of her pulmonary nodules, left upper lobe, somewhat decreased in size.  Patient given prednisone pack.  Further details below.  Cycle #6 chemotherapy will be resumed on 1/5/2021.  Started back on original irinotecan.  PET scan examination obtained on 1/12/2021 after cycle #6 chemotherapy reported improved pulmonary nodule hypermetabolic activity.  Confirmed these are metastatic lesions.  Patient is evaluated 1/19/2020, will continue cycle #7 FOLFIRI chemotherapy.  However Avastin will be on hold see next section.   Patient was reevaluated on 2/2/2021, will continue chemotherapy cycle #8 FOLFIRI.  Avastin / biosimilar will be added.    She talked to [facility] specialist in mid February 2021, and had recommended palliative chemotherapy without surgical intervention of metastatic lung lesions.   On 3/2/2021, patient will proceed with cycle #10 FOLFIRI plus bevacizumab.  After 12 cycles, plan to start maintenance treatment.  3/16/2021 cycle 11.  Plus bevacizumab.  3/30/2021 cycle 12 FOLFIRI plus bevacizumab.  Having issues with worsening nausea despite premeds with dexamethasone, Aloxi, Emend, and Zofran at home.  Patient is requesting a dose of Phenergan, which is helped her in the past.  We will give this to her with  treatment today.  PET scan examination on 4/6/2021 reported no evidence of hypermetabolic metastatic lesion.  Discussed with the patient on 4/13/2021, we reviewed the PET scan results.  We recommended to switch to maintenance chemotherapy without irinotecan.  We will continue 5-FU and Avastin every 2 weeks.  We discussed possibility of switching to oral Xeloda treatment.  Discussed with the patient for side effects more so with hand-foot syndrome.  Patient is agreeable.   Xeloda 2000 mg twice daily 7 days on, 7 days off along with Avastin initiated 4/27/2021.  After only 5 doses of Xeloda significant hand-foot syndrome, Xeloda held. Patient requesting to be transitioned back to infusional 5-FU, as she felt that she tolerated infusional 5-FU without any problem.  The patient will receive 5-FU leucovorin and Avastin every 2 weeks.  Xeloda discontinued.  5/25/2021 continued cycle #4 maintenance 5-FU, leucovorin, Avastin tolerating this much better so far, we will continue this. Recommended to have 12 cycles of maintenance chemotherapy, then obtain PET scan for reevaluation.  We could discuss further treatment plan at that time.  9/14/2021 patient due for cycle 12 of additional maintenance 5-FU/leucovorin plus Avastin.  She continues to tolerate treatment well overall.  She is anxious in the office today with her Mediport not getting blood at first and also with upcoming scans being heavy on her mind.  She was instructed to take one of her Klonopin pills while here to help calm her mind.  Heart rate did improve from 140s down to 112.  Proceed with treatment today as scheduled.  PET scan examination on 9/24/2021 reported further shrinking of the right upper lobe tiny pulmonary nodule.  No other new lesions.  Discussed with patient on 9/24/2021 about further shrinking of the right upper lobe pulmonary nodule and no evidence for other new lesions. We recommended to have chemo break for 3 months with repeated CT scan for  reevaluation.  Recent CT scan for chest 12/14/2021 and abdomen CT from 11/29/2021 reported no evidence for active disease. The right upper lobe pulmonary nodule, left lower lobe pulmonary nodule minimal about 4 mm and stable. I discussed with patient today on 12/21/2021, recommended to give her another 3 months chemotherapy holiday and obtain CT scan afterwards for reevaluation. After discussion, patient is agreeable.   CT scan examination for chest abdomen and pelvis obtained on 3/15/2022 reported a slight increase of the left upper lobe pulmonary nodule 5 mm, from previous 3 mm.  The other pulmonary nodules were stable in size.  There is also small left upper lobe groundglass changes.   Dr. Nguyen reviewed images studies with the patient 3/23/2022, compared to multiple previous images including CT chest CT and PET scan, suspected disease progression.  Discussed with the patient, and I recommended to resume maintenance chemotherapy with 5-FU plus leucovorin plus Avastin repeating every 2 weeks, and obtaining CT scan for reassessment in 3 months.  Patient is agreeable.  Patient resumed maintenance chemotherapy on 3/29/2022 with 5-FU leucovorin and Avastin.   4/26/2022, proceed with cycle #27 maintenance 5-FU, leucovorin, and Avastin.  Patient continues to tolerate treatment well.    On 5/10/2022, we will proceed ahead with cycle #28 maintenance chemotherapy.  Plan to have CT scan after cycle #30.  5/24/2022 cycle 29 maintenance chemotherapy.  Continue to tolerate well.  6/7/2022 cycle #30 maintenance chemotherapy, continuing to tolerate well.   CT scan for chest abdomen pelvis with IV contrast obtained on 6/21/2022 reported sub-6 mm pulmonary nodules either stable or slightly smaller.  We reviewed the images with the patient together.  We discussed with the patient and recommended to hold chemotherapy for now with repeating CT scan in 3 months for reassessment.  CT scan of the chest abdomen pelvis 9/13/2022 reported  stable condition, no evidence for recurrent or metastatic colon cancer.  On 1/10/2023 patient had a CT chest abdomen pelvis with reported no evidence for disease progression.  Laboratory study also showed normal CEA.   Patient had a CT scan for chest, abdomen, and pelvis obtained on 04/11/2023. This study reported very small pulmonary nodules, the right upper lobe nodule is stable to 6 mm, however, the left upper lobe and the left lower lobe nodule increased to 5 mm from previous 4 mm. I discussed with the patient today on 04/19/2023, I recommend to obtain another CT scan in 3 months for reassessment. The nodules are so small, they likely will not be picked up by PET scan examination.  CT scan examination of the chest, abdomen, and pelvis obtained on 7/7/2023 reported stable small pulmonary nodules. No evidence for disease progression or new lesions in chest, abdomen, and pelvis.  Discussed with patient today on 7/14/2023, however, patient is concerning for disease progression. I recommended to obtain Guardant Reveal for circulating tumor DNA study. If the study is positive, we will further obtain PET scan examination, otherwise, we will obtain CT scan for chest, abdomen, and pelvis in 3 months for reassessment.  The patient had CT scan for the chest, abdomen, and pelvis obtained on 10/27/2023, which reported worsening pulmonary nodules at bilateral upper lobes. Laboratory studies showed low normal CEA level and normal liver function panel. The Guardant Reveal study is still pending. I discussed this with the patient on 11/03/2023 and we shared the images, and I recommended having a PET scan examination for further assessment. I will present her case at the multimodality lung conference. If those lesions are deemed to be metastatic lesions, I recommend having stereotactic radiotherapy. I discussed it with the patient, and she voiced understanding and was agreeable with that strategy.  I presented her case at the  multimodality chest conference 11/16/2023.  The consensus was for patient to receive stereotactic radiation therapy for the right upper lobe lung lesion, and the bigger left upper lobe lesion, and monitor the smaller left upper lobe lesion with further images studies down the line. Also, the conference recommended Guardant Reveal study which we already ordered. I discussed with the patient today on 11/17/2023, we explained to the patient that the opinion of the conference and she is agreeable with this and prefers this strategy because of limited toxicity compared to long term commitment to starting chemotherapy again. I recommend to obtain a CT scan for the chest, abdomen, and pelvis with both IV and oral contrast for reassessment in 3 months for reassessment of metastatic lung lesions and thickness in the pelvis where the PET scan reported a low activity SUV 2.4 in the surgical bed.   The patient had steroid-induced radiation therapy for the right upper lobe and one of the left upper lobe lesions.  Her CT scan examination on 02/02/2024 reported shrinking nodules of the right upper lobe and one of the left upper lobe lesions, both from 9 mm down to 6 mm. There are 2 stable pulmonary nodules 7 mm. Nothing new.  02/09/2024, I discussed with the patient and recommended CT scan for the chest, abdomen, and pelvis in 3 months for reassessment. Guardant Reveal study was 0% ctDNA. We will also continue laboratory studies every 3 months for Guardant Reveal, together with routine labs, CBC, CMP, and CEA.  CT scan of the chest, abdomen, and pelvis conducted on 4/23/2024 revealed stable multiple pulmonary nodules, with no other new pulmonary nodules or evidence of disease recurrence or abnormal pelvis. The patient's liver function panel was normal, and low-level CEA level was also noted. The Guardant Reveal study was able to be obtained earlier due to regulatory rules, and we hugo obtain today the Guardant reveal study, be  available within 10 to 14 days.   Given the patient's metastatic liver lesion, and lung lesions from colon cancer, careful monitoring is necessary. A chest CT scan with IV contrast will be arranged in 3 months for reevaluation. The study will be reviewed again in 3 months, which will include CBC, CMP, and CEA level.   Imaging study with chest CT scan on 08/02/2024 reported multiple bilateral pulmonary nodules that are relatively stable. However, the Guardant Reveal reported positive ctDNA indicating disease progression. A subsequent PET scan examination on 08/14/2024 reported newly developed hypermetabolic pulmonary nodules. The highest SUV is 3.7, corresponding to an 8 mm new right upper lobe nodule, and there are several other hypometabolic nodules in the lungs.   8/21/2024 resumption of chemotherapy with 5-FU bevacizumab  Triage visit 8/28/2024 with intractable diarrhea that was unclear if this is secondary to chemotherapy or infectious etiology given her known chronic colitis, fever and abdominal pain.  Further management as outlined below  9/4/2024 per review today diarrhea has improved though she is having some difficulty with passage of gas and stool,?  Related to significant inflammation in the rectum versus tumor obstruction.  The passage of gas has improved in the last few days but she does still have to change positions on the toilet to get stool to pass without it being painful.  Discussed all of this with Dr. Nguyen and we will refer the patient urgently back to Dr. Ye for at the very least rectal examination in the office versus full repeated colonoscopy.  Because of the potential for examination or invasive procedures, we will hold bevacizumab today.  Because the patient had significant diarrhea last week and concerns that it may be related to chemotherapy we will decrease her 5-FU/leucovorin today by 30%.  If she does well we can go back to full dose with cycle 3.     9/18/2024 she presented for  evaluation prior to cycle #3 of palliative chemotherapy with 5-FU, leucovorin, and bevacizumab. Given her recent biopsy on 09/11/2024, it is prudent to postpone the Avastin treatment today to ensure safety.  Chemotherapy will be proceeded.      She presented for evaluation on 10/02/2024, tolerating chemotherapy except for diarrhea. This has been managed with octreotide. Proceed with cycle 4 chemotherapy today with 5-FU, leucovorin and resumption of bevacizumab.   Reevaluation 10/16/2024 due for cycle 5 5-FU, leucovorin, bevacizumab.  Due to ongoing significant diarrhea, bevacizumab will be placed on hold for potential surgical intervention.  Additionally, 5-FU will be dose reduced to 50%.  Additional adjustments as outlined below  Patient reviewed 10/30/2024 with improved tolerance to cycle five 5-FU, leucovorin.  We we will attempt to slightly escalate 5-FU dosing, increasing by 10% (40% dose reduction)  Patient did experience leakage of 5-FU, and return 10/31/2024 for evaluation of this.  She was sent for port dye study that showed correct location of her port, so 5-FU was resumed.  The patient's right rib pain is likely due to a new chair on the bone, as indicated by the PET scan. The PET scan revealed a new 2.8 x 1.3 cm groundglass opacity in the lateral aspect of the left apex with SUV 6.3. This appearance is nonspecific and may represent an infectious or inflammatory infiltrate. Continued monitoring and follow-up imaging are recommended.  The current regimen of 5-FU will be maintained, and Avastin will be reintroduced with full dose. 11/13/2024 will proceed ahead with 5-FU, leucovorin and bevacizumab.   12/4/2024 She also reports two bad days of diarrhea, likely due to Avastin. The dosage of Avastin will be reduced to 4 mg/kg. The 5-FU dosage will be increased to 1680 mg/m².   12/18/2024 proceed with 5-FU and Avastin continuing previous dose reductions as she had excellent tolerance to treatment 2 weeks ago.    12/31/2024 patient reports tolerating chemotherapy with the same dose adjustment.  This is a 1 day early because of the closure of the New Year's day holiday tomorrow.  Next cycle will be resumed in 15 days back to her usual schedule.  Will plan to have CT scan for chest abdomen pelvis with IV contrast in early February 2024.  1/29/2025 proceed with 5-FU and bevacizumab maintaining previous dosing.  CT scan for chest abdomen pelvis on 1/31/2025 reported stable small pulmonary nodules.  No evidence for disease progression.  Due to profound diarrhea from chemotherapy, we decided to postpone next chemotherapy to 3/5/2025.    3/5/2025 patient has improved diarrhea. Chemotherapy will be resumed with reduction of 5-FU at 1440 mg/m² and bevacizumab at 3 mg/kg and the leucovorin at 240 mg/m².  She will continue octreotide 100 mcg every 8 hours for chemotherapy-induced diarrhea, along with Imodium and Lomotil as needed.   3/19/2025 proceed with chemotherapy at previous dosing.  She tolerated most recent cycle of chemotherapy very well.    Today on 4/9/2025 patient presented for evaluation.  Laboratory studies reported normal CBC and unremarkable CMP.  Patient has been generally tolerating well except for some issue with her diarrhea.  We recommended to continue with the current dosage of Avastin and 5-FU with leucovorin. A scan is scheduled for mid-May 2025 for reassessment of her disease.      2.  Intractable diarrhea due to chemotherapy.   Patient developed diarrhea on Saturday, 8/24/2024.  Is unclear if this is related to infection as she did have fevers at the time the diarrhea began or chemotherapy.  She does have chronic colitis noted on most recent PET scan, this therefore could be diverticulitis flare  GI panel and C. difficile negative  8/29/2024 she did receive a single dose of octreotide  Begin empiric Flagyl 500 mg 3 times daily x 10 days  8/30/2024 discussed negative stool studies.  We will add Levaquin to  already prescribed Flagyl to complete management of diverticulitis.  It is unclear if the patient's symptoms are related to diverticular flare given her previous abdominal pain and fever versus chemotherapy.  However, her symptoms are currently improved  9/4/2024 diarrhea has resolved.  Stools are now more soft with some form but she is having difficulty with passage of stool, having to change positions on the toilet to avoid pain.  We are referring back to Dr. Ye as detailed above.  She will finish out the Flagyl and Levaquin as prescribed having roughly 3 days left of both.  We will also adjust doses of 5-FU/leucovorin today with concern for potential chemotherapy-induced diarrhea.  If she does well this cycle we can increase back to full dose with cycle 3 per Dr. Nguyen.  On 9/18/2024 patient reports that she experienced significant diarrhea during cycle #2 chemotherapy on 09/04/2024, leading to a 30% dose reduction. Despite taking Lomotil 2 tablets four times a day, Imodium, and Pepto-Bismol, her diarrhea persisted. She received octreotide shots twice, which improved her symptoms from watery to mushy stools but did not fully resolve the issue. She will continue with the current regimen and consider adding octreotide to her home regimen if diarrhea starts at home. The potential side effects of octreotide, including nausea, were discussed.   10/1/2024 she reports using octreotide self-injection at home, which has significantly improved her diarrhea. Most of the time, she only requires it once a day and occasionally twice a day, depending on the severity of her diarrhea. She is satisfied with the results, which have improved her quality of life and ability to eat properly.   She will also use Lomotil and the Imodium as needed.  Patient is very tearful in the office 10/16/2024 regarding debilitating diarrhea and interference with quality of life.  She questions potential surgical intervention and placement of  colostomy due to ongoing pain in her rectum and burning of her skin from diarrhea.  We discussed adjustments today including increased dose of octreotide to 200 mcg 3 times daily for diarrhea.  Chemotherapy dose adjustments.  Diarrhea was slightly improved following most recent cycle of chemotherapy.  Continue supportive care  12/4/2024 patient reports that Avastin may be the agent causing her diarrhea.  So we will decrease dose by 20% and to see how things going.  Improved diarrhea with dose reduction to Avastin.  Continue previous dosing.   Continue to be improved with continued octreotide 100 mg 3 times daily.  She also has Lomotil and Imodium to utilize for breakthrough symptoms.   4/9/2025 patient had some issues with diarrhea from chemotherapy.  It also caused irritation of her hemorrhoids.  Overall stable.  We recommended continue octreotide 3 times a day.     3.   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  Low-dose Lovenox discontinued and initiated Xarelto 20 mg daily.  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  Patient held Xarelto for 2 days prior to MediPort removal from the left chest wall and placement of new port in the right chest wall on 12/18/2020.  She resumed Xarelto that evening.  Progressive swelling in the neck, face, upper extremities prompting hospitalization with CT scan showing thrombosis in the right subclavian vein and SVC.  Patient with port associated thrombosis in the setting of factor V  Leiden heterozygosity and active metastatic malignancy.  Although she had been off of Xarelto for a few days, clearly Xarelto was not prohibiting progression of her thrombosis after she resumed treatment and there was some evidence to suggest thrombosis had been present at least in the innominate vein on prior scan in September but now appears chronic.  Current acute thrombosis involving SVC and right subclavian.  Patient was admitted and placed on heparin drip  Patient was evaluated by vascular surgery and intervention was not recommended for thrombolysis/thrombectomy.  On 12/22/2020, the patient developed worsening shortness of breath, increasing upper extremity edema consistent with worsening SVC syndrome.  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  On 2/2/2021, we discussed that side effects of Avastin/biosimilar related to thrombosis.  Since this patient already has been on Lovenox, I think the benefits from treatment for her cancer will outweigh that risk of thrombosis, will proceed ahead with Avastin.  I cautioned patient to watch out for signs of worsening thrombosis patient voiced understanding.   5/11/2021 Lovenox dosing will be adjusted due to patient's weight loss.  We discussed she needs 1 mg/kg.  Her syringes are 100 mg syringes she will do 0.9 mL subcu every 12 hours.  8/3/2021 Patient continues to have bruising on abdomen from lovenox injections.  Will need to monitor weight and adjust dosing in future if further weight loss.   Stable condition on 9/28/2021.  Continue Lovenox anticoagulation.    Patient reports no chest pain no dyspnea no leg edema. However she had bruising and also most recently abnormal small abscess for which she actually went to ER on 11/29/2021, had I&D. The wound is healing. No further drainage. Denies fever sweating or chills. After discussion, she will  continue Lovenox injection for now.   On 3/23/2022, patient reports good tolerance of Lovenox.  Nevertheless she still has small quantity of pus in the left lower abdomen abscess, she squeezes it every day to prevent it became worse.  She reports no fever sweating chills.  Recommended to start Augmentin 875 mg twice a day for 7 days.  She will continue Lovenox indefinitely.  On 4/12/2022, patient reports resolution of the small abscess at left lower abdominal wall.   Patient had CTA again on 11/2/2024 due to continued pleuritic pain that was negative for PE.  Continue Lovenox 1 mg/kg twice daily without signs or symptoms of bleeding, no signs or symptoms of thrombosis.      4.  Mild anemia.   She also has history of iron deficiency.  Iron studies reveal iron saturation of 10%.  She was started on oral iron but was unable to tolerate due to GI side effects.   Status post Injectafer 2/5/2020 and 2/12/2020   Improved hemoglobin 13.5 on 1/5/2021.  3/16/2020 when hemoglobin now up to 16.2, hematocrit 47.6.  Patient admits that she has started smoking again, and I have encouraged her to quit.  She denies any snoring or sleep apnea diagnosis.  Patient is not taking oral iron.  We will closely monitor.  3/30/2020 hemoglobin 15.7, HCT 47.5.  Patient states that she has cut back significantly on her smoking, although not quit yet.  I have encouraged her to continue working on this.  We will continue to closely monitor.  Laboratory studies 6/22/2021 reported excellent folate > 20 ng/mL, however low normal B12 level 357 pg/mL.    On 7/6/2021, normal hemoglobin 15.8, .9 and HCT 47.2%.  Patient was asked to start oral vitamin B12 at 1000 mcg daily.   Lab study on 12/14/2021 reported excellent ferritin 296 and iron saturation 25% with free iron 104 TIBC 424. Hemoglobin was 15.2 with .2.   Normal hemoglobin 14.6 on 3/15/2022.  Iron study reported normal ferritin 129.5 ng/mL however slightly low iron saturation 11%.   Continue to monitor for now.  On 1/29/2025 hemoglobin is normal at 13.6.   3/19/2025 hemoglobin normal at 13.7.   Her laboratory results from today, 04/09/2025, reported normal CBC with hemoglobin at 13.9, platelets at 185,000, WBC at 6040, and neutrophils at 4300.     5.  Peripheral neuropathy secondary to chemotherapy.   This is mild involving bilateral hands and feet as reported on 9/16/2020.   Will avoid chemotherapy with oxaliplatin.  Stable mild neuropathy as of 11/10/2020  Patient reports worsening peripheral neuropathy and cycle #5 chemotherapy on 12/8/2020 with and without irinotecan.   On 1/5/2021, patient reports improvement of peripheral neuropathy, will add irinotecan back to chemotherapy.  Continue to monitor and adjust medication.  Stable.      6.  Depression.  Patient seen by JULIET Davenport on 11/9/2020.  She was started on mirtazapine.  Lexapro was discontinued.  This has definitely improved her mood with the patient feeling overall much better.  She is sleeping better.  Appetite is improved with her actually eating more than she wants to.    Condition is stable.  She plans to continue follow-up with supportive oncology.      7. Malfunction of the portal catheter  Patient had a PowerPort placement on the left upper chest by Dr. Joseph on 8/9/2019.  Patient reports there was no blood drawn from the portal catheter. CT scan of the chest on 7/7/2023 reported occlusion of the SVC around to the Port-A-Cath tubing. I recommend trying activase to see if it helps.  Otherwise, we may have to remove the catheter. Clinically, patient has no signs of SVC syndrome.  On 11/03/2023, the patient reports that her Port-A-Cath was able to be used for a CT scan for the injection of intravenous dye; however, there was no blood return, so we needed to draw from her arm for the blood test. We will continue to keep Port-A-Cath for now.  On 02/09/2024, the patient reports that the port was able to be flushed.  However, no blood was returned. We will continue to flush every 6 weeks.  Port dye study 10/31/2024 showing correct position of port.  Fibrin sheath present.  No issue today.      8.  This patient also has strong family history for malignancy   The patient had appointment with genetic counseling on September 3, 2019.  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    9. Hypertension.  Continue management of hypertension and follow up with PCP.      10.  Chest and back pain  Ports this has been ongoing for about 4 weeks.  Started in her right chest/rib cage.  Originally this pain had completely resolved, however returned when she received her next chemotherapy infusion. She felt like the pain worsened after she was unhooked from her 5-FU and that the pain spread to her right scapular/back area.  Since unhook she has continued with pain in her right chest/back that has required her to take pain medicine regularly which is not typical for her.  She has pain with deep inspiration and pain with certain movements.  She also has pain when she pushes on the tissue around her port, though there is no redness or warmth around her port site aside from red rash in the shape of a bandaid and she reports being sensitive to bandaids.  Reviewed all of patients symptoms with Dr. Nguyen who recommended obtaining doppler right upper extremity and PET scan for further evaluation of this severe right sided pain that has been evaluated by CTA without any signs of what could be causing her pain.  Of note she was seen in the ED 11/2/2024, had CT chest performed which was negative for PE.  EKG and troponin looked okay.  She was discharged home.    Healing fracture of the right 2nd rib. The PET scan on 11/12/2024 showed new hypermetabolic activity at a healing fracture on the anterior aspect of the right second rib with SUV 5.7. The patient should avoid activities that may exacerbate the pain and consider pain management options as needed.     12/4/2024 the patient reports improvement in rib pain, with the ability to lay on the affected side, though still experiencing some soreness and pain upon sneezing or deep breathing.    Today 4/9/2025 She reports her ribs fracture has subsided, with the last one healing about a week ago. She has had 7 rib fractures in total, with 1 reinjury. The fractures occurred from various incidents, including falling down the stairs and landing hard, twisting over to get something off her bedside table, or leaning down to  objects.  Carole Weaver reports a pain score of 0.  Given her pain assessment as noted, treatment options were discussed and the following options were decided upon as a follow-up plan to address the patient's pain: continuation of current treatment plan for pain.        PLAN:    Proceed today with maintenance chemotherapy maintaining previous dosing.  The 5-FU at 1440 mg/m² and bevacizumab 3 mg/kg.  Leucovorin will be maintained at 240 mg/m²  Return in 2 days for 5-FU unhook  She will continue octreotide 100 mcg every 8 hours for chemotherapy-induced diarrhea, along with Imodium and Lomotil as needed.   Continue Lovenox subcutaneously every 12 hours for anticoagulation.  Continue Magic barrier cream as needed.   Continue Norco 5/325 as needed for pain.  1 to 2 tablets.  The patient was encouraged to utilize this for her musculoskeletal pain following her fall  Continue Protonix 40 mg daily.   Continue Gas-X.   Return in 2 weeks and in 4 weeks for CBC, CMP, and for nurse practitioner follow-up and continued 5-FU, leucovorin and Avastin  Also in 4 weeks obtain laboratory studies guardant reveal for circulating tumor DNA.  Obtain CT for chest abdomen pelvis with IV contrast in 5 weeks to reassess her metastatic rectal cancer.  I will see patient in 6 weeks for reevaluation, to discuss results of CT scan imaging, and ongoing management plan.  If stable or febrile response, will continue current  chemotherapy 5-FU leucovorin and Avastin.  Labs per protocol.    I spent 53 minutes caring for Carole on this date of service. This time includes time spent by me in the following activities: preparing for the visit, reviewing tests, obtaining and/or reviewing a separately obtained history, performing a medically appropriate examination and/or evaluation, counseling and educating the patient/family/caregiver, ordering medications, tests, or procedures, referring and communicating with other health care professionals, documenting information in the medical record, independently interpreting results and communicating that information with the patient/family/caregiver and care coordination     The patient is on a high risk medication requiring close monitoring for toxicity.       Dong Nguyen MD PhD  04/09/2025        CC:  Deepika Vela III NP-C   MD Alverto Bowers MD             English

## 2025-04-11 ENCOUNTER — INFUSION (OUTPATIENT)
Dept: ONCOLOGY | Facility: HOSPITAL | Age: 45
End: 2025-04-11
Payer: COMMERCIAL

## 2025-04-11 DIAGNOSIS — C78.00 MALIGNANT NEOPLASM METASTATIC TO LUNG, UNSPECIFIED LATERALITY: ICD-10-CM

## 2025-04-11 DIAGNOSIS — Z79.899 ENCOUNTER FOR LONG-TERM (CURRENT) USE OF HIGH-RISK MEDICATION: ICD-10-CM

## 2025-04-11 DIAGNOSIS — Z45.2 ENCOUNTER FOR FITTING AND ADJUSTMENT OF VASCULAR CATHETER: Primary | ICD-10-CM

## 2025-04-11 DIAGNOSIS — C20 ADENOCARCINOMA OF RECTUM: ICD-10-CM

## 2025-04-11 PROCEDURE — 25010000002 HEPARIN LOCK FLUSH PER 10 UNITS: Performed by: INTERNAL MEDICINE

## 2025-04-11 RX ORDER — HEPARIN SODIUM (PORCINE) LOCK FLUSH IV SOLN 100 UNIT/ML 100 UNIT/ML
500 SOLUTION INTRAVENOUS AS NEEDED
OUTPATIENT
Start: 2025-04-11

## 2025-04-11 RX ORDER — SODIUM CHLORIDE 0.9 % (FLUSH) 0.9 %
10 SYRINGE (ML) INJECTION AS NEEDED
Status: DISCONTINUED | OUTPATIENT
Start: 2025-04-11 | End: 2025-04-11 | Stop reason: HOSPADM

## 2025-04-11 RX ORDER — SODIUM CHLORIDE 0.9 % (FLUSH) 0.9 %
10 SYRINGE (ML) INJECTION AS NEEDED
OUTPATIENT
Start: 2025-04-11

## 2025-04-11 RX ORDER — HEPARIN SODIUM (PORCINE) LOCK FLUSH IV SOLN 100 UNIT/ML 100 UNIT/ML
500 SOLUTION INTRAVENOUS AS NEEDED
Status: DISCONTINUED | OUTPATIENT
Start: 2025-04-11 | End: 2025-04-11 | Stop reason: HOSPADM

## 2025-04-11 RX ADMIN — Medication 500 UNITS: at 12:23

## 2025-04-11 RX ADMIN — Medication 10 ML: at 12:22

## 2025-04-14 DIAGNOSIS — G89.3 CANCER ASSOCIATED PAIN: ICD-10-CM

## 2025-04-14 RX ORDER — HYDROCODONE BITARTRATE AND ACETAMINOPHEN 5; 325 MG/1; MG/1
2 TABLET ORAL EVERY 6 HOURS PRN
Qty: 90 TABLET | Refills: 0 | Status: CANCELLED | OUTPATIENT
Start: 2025-04-14

## 2025-04-15 DIAGNOSIS — G89.3 CANCER ASSOCIATED PAIN: ICD-10-CM

## 2025-04-15 RX ORDER — ROSUVASTATIN CALCIUM 5 MG/1
5 TABLET, COATED ORAL DAILY
Qty: 90 TABLET | Refills: 0 | Status: SHIPPED | OUTPATIENT
Start: 2025-04-15

## 2025-04-15 RX ORDER — HYDROCODONE BITARTRATE AND ACETAMINOPHEN 5; 325 MG/1; MG/1
2 TABLET ORAL EVERY 6 HOURS PRN
Qty: 90 TABLET | Refills: 0 | Status: SHIPPED | OUTPATIENT
Start: 2025-04-15

## 2025-04-20 PROBLEM — R91.1 PULMONARY NODULE, RIGHT: Status: RESOLVED | Noted: 2020-05-13 | Resolved: 2025-04-20

## 2025-04-20 PROBLEM — R91.8 PULMONARY NODULES: Status: RESOLVED | Noted: 2020-09-16 | Resolved: 2025-04-20

## 2025-04-22 ENCOUNTER — TELEPHONE (OUTPATIENT)
Dept: ONCOLOGY | Facility: CLINIC | Age: 45
End: 2025-04-22
Payer: COMMERCIAL

## 2025-04-22 DIAGNOSIS — C78.00 MALIGNANT NEOPLASM METASTATIC TO LUNG, UNSPECIFIED LATERALITY: ICD-10-CM

## 2025-04-22 DIAGNOSIS — C78.01 MALIGNANT NEOPLASM METASTATIC TO BOTH LUNGS: ICD-10-CM

## 2025-04-22 DIAGNOSIS — T45.1X5A ADVERSE EFFECT OF ANTINEOPLASTIC AND IMMUNOSUPPRESSIVE DRUGS, INITIAL ENCOUNTER: ICD-10-CM

## 2025-04-22 DIAGNOSIS — C78.02 MALIGNANT NEOPLASM METASTATIC TO BOTH LUNGS: ICD-10-CM

## 2025-04-22 DIAGNOSIS — C78.00 MALIGNANT NEOPLASM METASTATIC TO LUNG, UNSPECIFIED LATERALITY: Primary | ICD-10-CM

## 2025-04-22 DIAGNOSIS — R39.9 UTI SYMPTOMS: ICD-10-CM

## 2025-04-22 DIAGNOSIS — C20 ADENOCARCINOMA OF RECTUM: Primary | ICD-10-CM

## 2025-04-22 RX ORDER — CEFDINIR 300 MG/1
300 CAPSULE ORAL 2 TIMES DAILY
Qty: 10 CAPSULE | Refills: 0 | Status: SHIPPED | OUTPATIENT
Start: 2025-04-22 | End: 2025-04-23

## 2025-04-22 RX ORDER — HYDROCORTISONE 25 MG/G
CREAM TOPICAL
Qty: 30 G | Refills: 1 | Status: SHIPPED | OUTPATIENT
Start: 2025-04-22

## 2025-04-22 RX ORDER — OCTREOTIDE ACETATE 100 UG/ML
INJECTION, SOLUTION INTRAVENOUS; SUBCUTANEOUS
Qty: 90 ML | Refills: 2 | Status: SHIPPED | OUTPATIENT
Start: 2025-04-22

## 2025-04-22 NOTE — TELEPHONE ENCOUNTER
Per Dr Nguyen he ordered Cefdinir  300 mg BID for 5 days. Will collect a urine sample when patient is in the [facility] office on 4/23/25. Prescription sent to [facility]

## 2025-04-22 NOTE — TELEPHONE ENCOUNTER
Patient called this morning stating she cannot do chemotherapy tomorrow 4/23/25 due to patient has has ongoing diarrhea since Thursday 4/17/25. Due to the diarrhea patient is having to soak in a hot tub up to 10 times a day because of the rawness and pain to her skin caused by the diarrhea. Patient is not sleeping well and is eating and drinking very little because eating food causes a increase in the diarrhea. Patient has been taking both the Lomotil and Octreotide and neither are helping slow the diarrhea.     Per patient she weighed herself this morning and she has loss approximately 7 lbs since last week.    Spoke with Dr Nguyen. He stated will HOLD treatment tomorrow but if patient can come in tomorrow he would like for her to have labs, APRN follow up and IV fluids.    PAtient to call in the morning 4/23/25 if she cannot come in for appointment

## 2025-04-22 NOTE — TELEPHONE ENCOUNTER
Patient calling states she thinks she has a UTI and was hoping Dr Nguyen can call something in. Patient is scheduled in the [facility] office on 4/23/25 and will do a urine sample then. Message sent to Dr Nguyen.

## 2025-04-23 ENCOUNTER — INFUSION (OUTPATIENT)
Dept: ONCOLOGY | Facility: HOSPITAL | Age: 45
End: 2025-04-23
Payer: COMMERCIAL

## 2025-04-23 ENCOUNTER — CLINICAL SUPPORT (OUTPATIENT)
Dept: OTHER | Facility: HOSPITAL | Age: 45
End: 2025-04-23
Payer: COMMERCIAL

## 2025-04-23 ENCOUNTER — OFFICE VISIT (OUTPATIENT)
Dept: ONCOLOGY | Facility: CLINIC | Age: 45
End: 2025-04-23
Payer: COMMERCIAL

## 2025-04-23 VITALS
TEMPERATURE: 97.3 F | HEART RATE: 105 BPM | SYSTOLIC BLOOD PRESSURE: 122 MMHG | HEIGHT: 66 IN | RESPIRATION RATE: 14 BRPM | BODY MASS INDEX: 27.21 KG/M2 | OXYGEN SATURATION: 99 % | WEIGHT: 169.3 LBS | DIASTOLIC BLOOD PRESSURE: 80 MMHG

## 2025-04-23 DIAGNOSIS — C20 ADENOCARCINOMA OF RECTUM: ICD-10-CM

## 2025-04-23 DIAGNOSIS — C78.02 MALIGNANT NEOPLASM METASTATIC TO BOTH LUNGS: ICD-10-CM

## 2025-04-23 DIAGNOSIS — C78.00 MALIGNANT NEOPLASM METASTATIC TO LUNG, UNSPECIFIED LATERALITY: ICD-10-CM

## 2025-04-23 DIAGNOSIS — Z79.899 ENCOUNTER FOR LONG-TERM (CURRENT) USE OF HIGH-RISK MEDICATION: ICD-10-CM

## 2025-04-23 DIAGNOSIS — R39.9 UTI SYMPTOMS: ICD-10-CM

## 2025-04-23 DIAGNOSIS — C20 ADENOCARCINOMA OF RECTUM: Primary | ICD-10-CM

## 2025-04-23 DIAGNOSIS — G89.3 CANCER ASSOCIATED PAIN: ICD-10-CM

## 2025-04-23 DIAGNOSIS — T45.1X5A ADVERSE EFFECT OF ANTINEOPLASTIC AND IMMUNOSUPPRESSIVE DRUGS, INITIAL ENCOUNTER: ICD-10-CM

## 2025-04-23 DIAGNOSIS — R19.7 INTRACTABLE DIARRHEA: ICD-10-CM

## 2025-04-23 DIAGNOSIS — C78.01 MALIGNANT NEOPLASM METASTATIC TO BOTH LUNGS: ICD-10-CM

## 2025-04-23 DIAGNOSIS — E86.0 DEHYDRATION: ICD-10-CM

## 2025-04-23 DIAGNOSIS — E87.6 HYPOKALEMIA: ICD-10-CM

## 2025-04-23 LAB
ALBUMIN SERPL-MCNC: 3.6 G/DL (ref 3.5–5.2)
ALBUMIN/GLOB SERPL: 1.4 G/DL
ALP SERPL-CCNC: 103 U/L (ref 39–117)
ALT SERPL W P-5'-P-CCNC: 13 U/L (ref 1–33)
ANION GAP SERPL CALCULATED.3IONS-SCNC: 15.1 MMOL/L (ref 5–15)
AST SERPL-CCNC: 15 U/L (ref 1–32)
BASOPHILS # BLD AUTO: 0.02 10*3/MM3 (ref 0–0.2)
BASOPHILS NFR BLD AUTO: 0.3 % (ref 0–1.5)
BILIRUB SERPL-MCNC: 0.2 MG/DL (ref 0–1.2)
BILIRUB UR QL STRIP: ABNORMAL
BUN SERPL-MCNC: 5 MG/DL (ref 6–20)
BUN/CREAT SERPL: 8.6 (ref 7–25)
C DIFF TOX GENS STL QL NAA+PROBE: NEGATIVE
CALCIUM SPEC-SCNC: 8.8 MG/DL (ref 8.6–10.5)
CHLORIDE SERPL-SCNC: 106 MMOL/L (ref 98–107)
CLARITY UR: ABNORMAL
CO2 SERPL-SCNC: 21.9 MMOL/L (ref 22–29)
COLOR UR: ABNORMAL
CREAT SERPL-MCNC: 0.58 MG/DL (ref 0.57–1)
DEPRECATED RDW RBC AUTO: 51 FL (ref 37–54)
EGFRCR SERPLBLD CKD-EPI 2021: 113.9 ML/MIN/1.73
EOSINOPHIL # BLD AUTO: 0.1 10*3/MM3 (ref 0–0.4)
EOSINOPHIL NFR BLD AUTO: 1.5 % (ref 0.3–6.2)
ERYTHROCYTE [DISTWIDTH] IN BLOOD BY AUTOMATED COUNT: 14.4 % (ref 12.3–15.4)
GLOBULIN UR ELPH-MCNC: 2.6 GM/DL
GLUCOSE SERPL-MCNC: 125 MG/DL (ref 65–99)
GLUCOSE UR STRIP-MCNC: NEGATIVE MG/DL
HCT VFR BLD AUTO: 43.8 % (ref 34–46.6)
HGB BLD-MCNC: 14.5 G/DL (ref 12–15.9)
HGB UR QL STRIP.AUTO: ABNORMAL
IMM GRANULOCYTES # BLD AUTO: 0.02 10*3/MM3 (ref 0–0.05)
IMM GRANULOCYTES NFR BLD AUTO: 0.3 % (ref 0–0.5)
KETONES UR QL STRIP: ABNORMAL
LEUKOCYTE ESTERASE UR QL STRIP.AUTO: NEGATIVE
LYMPHOCYTES # BLD AUTO: 1.59 10*3/MM3 (ref 0.7–3.1)
LYMPHOCYTES NFR BLD AUTO: 23.8 % (ref 19.6–45.3)
MAGNESIUM SERPL-MCNC: 2 MG/DL (ref 1.6–2.6)
MCH RBC QN AUTO: 32.2 PG (ref 26.6–33)
MCHC RBC AUTO-ENTMCNC: 33.1 G/DL (ref 31.5–35.7)
MCV RBC AUTO: 97.3 FL (ref 79–97)
MONOCYTES # BLD AUTO: 0.53 10*3/MM3 (ref 0.1–0.9)
MONOCYTES NFR BLD AUTO: 7.9 % (ref 5–12)
NEUTROPHILS NFR BLD AUTO: 4.41 10*3/MM3 (ref 1.7–7)
NEUTROPHILS NFR BLD AUTO: 66.2 % (ref 42.7–76)
NITRITE UR QL STRIP: NEGATIVE
NRBC BLD AUTO-RTO: 0 /100 WBC (ref 0–0.2)
PH UR STRIP.AUTO: 6.5 [PH] (ref 4.5–8)
PLATELET # BLD AUTO: 193 10*3/MM3 (ref 140–450)
PMV BLD AUTO: 9.3 FL (ref 6–12)
POTASSIUM SERPL-SCNC: 3.2 MMOL/L (ref 3.5–5.2)
PROT SERPL-MCNC: 6.2 G/DL (ref 6–8.5)
PROT UR QL STRIP: ABNORMAL
RBC # BLD AUTO: 4.5 10*6/MM3 (ref 3.77–5.28)
SODIUM SERPL-SCNC: 143 MMOL/L (ref 136–145)
SP GR UR STRIP: 1.02 (ref 1–1.03)
UROBILINOGEN UR QL STRIP: ABNORMAL
WBC NRBC COR # BLD AUTO: 6.67 10*3/MM3 (ref 3.4–10.8)

## 2025-04-23 PROCEDURE — 25010000002 POTASSIUM CHLORIDE 10 MEQ/100ML SOLUTION: Performed by: NURSE PRACTITIONER

## 2025-04-23 PROCEDURE — 87493 C DIFF AMPLIFIED PROBE: CPT | Performed by: NURSE PRACTITIONER

## 2025-04-23 PROCEDURE — 80053 COMPREHEN METABOLIC PANEL: CPT

## 2025-04-23 PROCEDURE — 96365 THER/PROPH/DIAG IV INF INIT: CPT

## 2025-04-23 PROCEDURE — 96366 THER/PROPH/DIAG IV INF ADDON: CPT

## 2025-04-23 PROCEDURE — 83735 ASSAY OF MAGNESIUM: CPT

## 2025-04-23 PROCEDURE — 25810000003 SODIUM CHLORIDE 0.9 % SOLUTION: Performed by: NURSE PRACTITIONER

## 2025-04-23 PROCEDURE — 81003 URINALYSIS AUTO W/O SCOPE: CPT

## 2025-04-23 PROCEDURE — 85025 COMPLETE CBC W/AUTO DIFF WBC: CPT

## 2025-04-23 RX ORDER — NITROFURANTOIN 25; 75 MG/1; MG/1
100 CAPSULE ORAL 2 TIMES DAILY
Qty: 8 CAPSULE | Refills: 0 | Status: SHIPPED | OUTPATIENT
Start: 2025-04-23

## 2025-04-23 RX ORDER — POTASSIUM CHLORIDE 7.45 MG/ML
20 INJECTION INTRAVENOUS ONCE
Status: COMPLETED | OUTPATIENT
Start: 2025-04-23 | End: 2025-04-23

## 2025-04-23 RX ORDER — SODIUM CHLORIDE 9 MG/ML
1000 INJECTION, SOLUTION INTRAVENOUS ONCE
Status: COMPLETED | OUTPATIENT
Start: 2025-04-23 | End: 2025-04-23

## 2025-04-23 RX ORDER — POTASSIUM CHLORIDE 750 MG/1
10 TABLET, EXTENDED RELEASE ORAL DAILY
Qty: 20 TABLET | Refills: 0 | Status: SHIPPED | OUTPATIENT
Start: 2025-04-23

## 2025-04-23 RX ADMIN — SODIUM CHLORIDE 1000 ML: 9 INJECTION, SOLUTION INTRAVENOUS at 11:36

## 2025-04-23 RX ADMIN — POTASSIUM CHLORIDE 20 MEQ: 10 INJECTION, SOLUTION INTRAVENOUS at 11:35

## 2025-04-23 NOTE — PROGRESS NOTES
REASON FOR FOLLOW UP:     *. Rectal cancer, rectal ultrasound examination in July 2019 reported T3N0 disease without lymphadenopathy. She does have small pulmonary nodule 6-7 mm and 2 mm with indeterminate feature. There is no solid evidence of metastatic disease otherwise. Patient has stage IIA (T3N0M0) disease.  Post neoadjuvant chemoradiation therapy, patient underwent surgical resection of the primary rectal cancer by Dr. Ye on 12/2/2019.  Pathology evaluation reported residual T3N1 disease stage IIIb.   PET scan examination on 9/18/2020 reported multiple hypermetabolic small pulmonary nodules/ new pulmonary nodules.   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 It reported tumor mutation burden 5 Muts/Mb, microsatellite stable, TP53 mutation R282W, and others.   Patient had a telemedicine evaluation at the [facility] in February 2021.  They agreed with our treatment plan for palliative chemotherapy followed by maintenance chemotherapy.     *.The patient is heterozygous factor V Leiden, was on prophylactic anticoagulation with Lovenox 40 mg daily given her increased risk of thrombosis with MediPort and GI malignancy.   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  Patient was switched to weight-based Lovenox injection.      HISTORY OF PRESENT ILLNESS:  The patient is a 44 y.o. female with the above-mentioned history, who is seen today for evaluation.     She returns to the office today in anticipation of chemotherapy.  Unfortunately, the patient reports she is feeling very poorly.  She struggled with her last cycle of chemotherapy.  Approximately 5 days after chemo she developed profound diarrhea which was watery.  She increased her octreotide and maximize Imodium and Lomotil and symptoms persist.  Due to significant diarrhea she has had  exacerbations of her hemorrhoids, rectal excoriation.  She has been utilizing her pain medication.  She reports her bowels have minimally improved over the past few days.  She has had decreased intake and appetite due to diarrhea.  She has noted to have lost weight.    Past Medical History:   Diagnosis Date    Abdominopelvic abscess 08/12/2020    ADMITTED TO [facility]    Abnormal Pap smear of cervix 02/02/1998    JULIUS I    Abscess of abdominal wall 11/28/2012    SEEN AT [facility] ER    Anemia in pregnancy     Anxiety     Bilateral epiphora 04/2020    Bilateral hand swelling 05/02/2020    SEEN AT [facility] ER    Cervical lymphadenitis 06/06/2012    SEEN AT [facility] ER    Chemotherapy induced diarrhea 01/2021    Chemotherapy induced neutropenia 04/2020    Chemotherapy-induced nausea 02/2020    Chemotherapy-induced thrombocytopenia 05/2020    Chronic anticoagulation     Chronic diarrhea     Colon polyps     FOLLOWED BY DR. 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 ALEXANDRU HUNT    History of gestational diabetes     History of pre-eclampsia 1998    History of pre-eclampsia     History of radiation therapy 2019    FOLLOWED BY DR. JAVON LEWIS    History of TB skin testing 01/17/2009    TB Skin Test    History of TB skin testing 01/17/2009    TB Skin Test    History of transfusion 2019    12/2019    HPV in female 1998    Hypokalemia 09/2019    Hypomagnesemia 09/2019     Hyponatremia 2021    IBS (irritable bowel syndrome)     Ileostomy present 2020    Take down on 11/3/2022    Infected insect bite of neck 2016    SEEN AT [facility]    Kidney stone on right side 10/07/2019    Kidney stones 2007    SEEN AT [facility] ER    Low anterior resection syndrome 2022    FOLLOWED BY DR. PADMAJA ESPARZA    Lump of right breast     SWOLLEN LYMPH NODE    Lung cancer 2020    METASTATIC LUNG CANCER    Macrocytosis 2021    Monopolar depression     On anticoagulant therapy     Pain associated with surgical procedure 2020    Palmar-plantar erythrodysesthesia 2021    Perirectal abscess 2020    Perirectal abscess 2020    Added automatically from request for surgery [id number]      Pulmonary embolism without acute cor pulmonale 09/20/2019    X 3; ADMITTED TO [facility]    Pulmonary nodule, right 2020    Rectal cancer 2019    STAGE IIA, INVASIVE MODERATELY DIFFERENTIATED ADENOCARCINOMA, CHEMO AND XRT FINISHED 2019    Right shoulder pain 2020    SEEN AT [facility] ER    Right ureteral stone 10/01/2019    SEEN AT [facility] ER    Right ureteral stone 10/01/2019    SEEN AT [facility] ER      SAB (spontaneous ) 1996     A2-1 INDUCED    Sciatica     Sepsis due to cellulitis 2002    LEFT GREAT TOE, ADMITTED TO [facility]    Tachycardia 2020    Thrombosis of superior vena cava 2020    AND BRACHIOCEPHALIC VEIN, ADMITTED TO [facility]    Urinary urgency 2020   Patient had COVID-19 infection diagnosed on 2022 despite was fully vaccinated and boosted.  She recovered without problem.      Past Surgical History:   Procedure Laterality Date    ABDOMINAL SURGERY      CHOLECYSTECTOMY N/A 10/10/2003    LAPAROSCOPIC WITH CHOLANGIOGRAM, DR. JAMEY TALAVERA AT [facility]    COLON RESECTION N/A 2019    Procedure: laparoscopic low anterior colon resection with mobilization of splenic flexure and diverting loop ileostomy: ERAS;  Surgeon: Padmaja Esparza MD;  Location:  [facility] OR;  Service: General    COLON RESECTION N/A 08/03/2020    Procedure: LOW ANTERIOR COLON RESECTION, COLOANAL ANASTOMOSIS, MOBILIZATION SPLENIC FLEXURE;  Surgeon: Padmaja Esparza MD;  Location: [facility];  Service: General;  Laterality: N/A;    COLONOSCOPY N/A 07/15/2020    PATENT ANASTAMOSIS IN MID RECTUM, RESCOPE IN 1 YR, DR. PADMAJA ESPARZA AT [facility]    COLONOSCOPY N/A 11/03/2022    DIFFUSE AREA OF MODERATELY FRIABLE MUCOSA IN ENTIRE COLON , DIVERSION COLITIS, PATENT AND HEALTHY ANASTAMOSIS, DR. PADMAJA ESPARZA AT [facility]    COLONOSCOPY N/A 12/04/2023    6 MM SESSILE SERRATED ADENOMA POLYP IN DESCENDING, PATENT ANASTAMOSIS IN DISTAL RECTUM, RESCOPE IN 2 YRS, DR. PADMAJA ESPARZA AT [facility]    COLONOSCOPY W/ POLYPECTOMY N/A 07/08/2019    15 MM TUBULOVILLOUS ADENOMA POLYP IN HEPATIC FLEXURE, 20 MMTUBULOVILLOUS ADENOMA WITH HIGH GRADE DYSPLASIA IN RECTOSIGMOID, 4 CM MASS IN MID RECTUM, PATH: INVASIVE MODERATELY DIFFERENTIATED ADENOCARCINOMA, DR. JENNIFER LI AT [facility]    DILATATION AND EVACUATION N/A 2009    ENDOSCOPY N/A 07/08/2019    LA GRADE A ESOPHAGITIS, GASTRITIS, ALL BIOPSIES BENIGN, DR. JENNIFER LI AT [facility]    ILEOSTOMY CLOSURE N/A 11/14/2022    Procedure: ILEOSTOMY TAKEDOWN;  Surgeon: Padmaja Esparza MD;  Location: [facility];  Service: General;  Laterality: N/A;    INCISION AND DRAINAGE PERIRECTAL ABSCESS N/A 08/14/2020    Procedure: INCISION AND DRAINAGE OF retrorectal dehiscence abcess with drain placement and irrigation;  Surgeon: Padmaja Esparza MD;  Location: [facility];  Service: General;  Laterality: N/A;    PAP SMEAR N/A 01/24/2014    SIGMOIDOSCOPY N/A 07/24/2019    INFILTRATIVE PARTIALLY OBSTRUCTING LARGE RECTAL CANCER, AREA TATOOED, DR. PADMAJA ESPARZA AT [facility]    SIGMOIDOSCOPY N/A 11/23/2019    AN ULCERATED PARTIALLY OBSTRUCTING MASS IN MID RECTUM, AREA TATTOOED, DR. PADMAJA ESPARZA AT [facility]    SIGMOIDOSCOPY N/A 07/22/2021    PATENT ANASTAMOSIS IN DISTAL RECTUM,  RESCOPE IN 1 YR, DR. GERONIMO ESPARZA AT [facility]    SIGMOIDOSCOPY N/A 09/11/2024    PATCHY INFLAMMATION AND EDEMA IN DESCENDING, AREA BIOPSIED AND WAS BENIGN, PATENT AND HEALTHY ANASTAMOSIS, HEMORRHOIDS,RESCOPE(CY) 12/2025, DR. GERONIMO ESPARZA AT [facility]    TRANSRECTAL ULTRASOUND N/A 07/24/2019    Procedure: ULTRASOUND TRANSRECTAL;  Surgeon: Geronimo Esparza MD;  Location: [facility] ENDOSCOPY;  Service: General    URETEROSCOPY LASER LITHOTRIPSY WITH STENT INSERTION Right 10/30/2019    DR. ESTUARDO BEASLEY AT [facility]    VAGINAL DELIVERY N/A 09/18/1998    VENOUS ACCESS DEVICE (PORT) INSERTION Left 08/09/2019    Procedure: INSERTION VENOUS ACCESS DEVICE;  Surgeon: Sj Joseph MD;  Location:  [facility];  Service: General    VENOUS ACCESS DEVICE (PORT) INSERTION N/A 12/18/2020    Procedure: INSERTION VENOUS ACCESS DEVICE right side, removal venous access device left side;  Surgeon: Sj Joseph MD;  Location:  [facility];  Service: General;  Laterality: N/A;    WISDOM TOOTH EXTRACTION Bilateral 1993       HEMATOLOGIC/ONCOLOGIC HISTORY:      Rectal cancer, rectal ultrasound examination in July 2019 reported T3N0 disease without lymphadenopathy. She does have small pulmonary nodule 6-7 mm and 2 mm with indeterminate feature. There is no solid evidence of metastatic disease otherwise. Patient has stage IIA (T3N0M0) disease.  The patient is heterozygous factor V Leiden, was on prophylactic anticoagulation with Lovenox 40 mg daily given her increased risk of thrombosis with MediPort and GI malignancy.   PET scan on 8/8/2019 reported an 8 mm hypermetabolic right upper lobe pulmonary nodule, which is suspicious for metastatic as well.    Patient had a PowerPort placement on the left upper chest by Dr. Joseph on 8/9/2019.  Patient was started on concurrent infusional 5-FU chemoradiation therapy on 8/12/2019, with planned complete surgical resection and further adjuvant chemotherapy with intention to cure the disease.   Patient  underwent surgical resection of the primary rectal cancer by Dr. Ye on 12/2/2019.  Pathology evaluation reported residual T3N1 disease stage IIIb.  Diarrhea related to therapy and radiation.   Patient was started cycle 1 day 1 of adjuvant FOLFOX 6 on 1/23/2020.  On 2/5/2020 FOLFOX 6 cycle 2 delayed secondary to neutropenia.  Patient was given 3 days of Granix injection.  After cycle #2 we planned 3 days of Granix with each cycle.   Patient also intolerant of oral iron.  Patient received 2 doses of IV injectafer on 02/05/2020 and 02/12/2020.   02/12/2020 Proceed with cycle #2 of FOLFOX 6 with 3 days of Granix.  On 3/11/2020 cycle 4 postponed secondary to abdominal pain and occasional low-grade fevers.  CT scans ordered.  Cycle #4 resumed after CT scan revealed no evidence of disease.  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 Ye. patient has no fever.  Continue to observe.   Grade 3 hand-foot syndrome secondary to 5-FU after cycle #7 FOLFOX 6 chemotherapy, prompting ER visit.  Also has worsening peripheral neuropathy.   Cycle #8 FOLFOX 6 was given on 5/13/2020, with 50% dose reduction for both 5-FU and oxaliplatin, and also examination of bolus 5-FU.   PET scan examination on 5/21/2020 reported no evidence of metastatic disease in the chest abdomen pelvis.  Stable 6 mm RUL pulmonary nodule.  On 5/27/2020, I discussed with the patient that we can discontinue chemotherapy at this time.   Patient had a surgical procedure for low anterior colon resection, coloanal anastomosis on 8/3/2020.  CT scan for chest abdomen pelvis on 9/9/2020 reported a new 8 mm noncalcified pulmonary nodule in the left lower lobe of the lung.  Otherwise stable right upper lobe 6 mm pulmonary nodule, and no evidence of disease recurrence in the abdomen or pelvis.  PET scan examination on 9/18/2020 reported multiple hypermetabolic small pulmonary nodules/ new pulmonary nodules.    Patient was started on pelvic chemotherapy with FOLFIRI regimen on 10/13/2020.   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 It reported tumor mutation burden 5 Muts/Mb, microsatellite stable, TP53 mutation R282W, and others.   Cycle #5 was without irinotecan, due to peripheral neuropathy.  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  Patient was switched to weight-based Lovenox injection.  Cycle #6 5-FU and irinotecan was delayed by 2 weeks because of the above incident.  Patient had a telemedicine evaluation at that the [facility] in February 2021.  They agreed with our treatment plan for palliative chemotherapy followed by maintenance chemotherapy.    PET scan examination on 4/6/2021 after cycle #12 palliative chemotherapy reported no evidence of hypermetabolic metastatic lesion.   Patient was started on maintenance chemotherapy with 5-FU and Avastin on 4/13/2021. (Unable to tolerate Xeloda because of a significant hand-foot syndrome).   PET scan on 9/24/2021 obtained after cycle #12 maintenance chemotherapy with 5-FU, leucovorin, Avastin reported no evidence for active disease. We recommended 3 months chemotherapy holiday.  CT scan examination on 3/15/2022 reported slightly disease progression with increase in size of small pulmonary nodule.  We started patient back on maintenance chemotherapy on 3/29/2022 treatment with 5-FU plus leucovorin and Avastin, repeating every 2 weeks.  After 6 more maintenance chemotherapy, CT scan for chest abdomen pelvis was done on 6/21/2022 which reported stable sub-6 mm pulmonary nodules.  Patient had last maintenance chemotherapy on 6/7/2022.   Disease progression, patient was restarted back on chemotherapy with 5-FU leucovorin and bevacizumab 8/21/2024    The patient reports she has intermittent rectal bleeding and  urgency, mucous with her stool, starting sometime in 2016. At that time she was referred to GI service, and was diagnosed of irritable bowel syndrome. Nevertheless she had worsening urgency for bowel movement, and had a couple of incidents losing control of stool. She was recently seen by Roland Thorpe MD again and had colonoscopy and EGD exam on 07/08/2019. She was found having a circumferential rectal mass. According to the procedure note, the patient had a fungating circumferential bleeding 4 cm mass in the middle rectum, at distance between 13 cm and 17 cm from the anus. Mass was causing partial obstruction. There were also colon polyps found at the hepatic flexure and also at the rectosigmoid junction 23 cm from the anus. Both were resected and retrieved. EGD examination reported grade A esophagitis at the GE junction and patchy discontinuous erythema and aggravation of the mucosa without bleeding in the stomach body. There is normal mucosa of the duodenum. Pathology evaluation from this procedure reported moderately differentiated adenocarcinoma involving the rectal mass. The rectal sigmoid junction polyp was tubular/tubulovillous adenoma with high grade dysplasia negative for invasive malignancy. The hepatic flexure polyp was also tubular/tubulovillous adenoma negative for high grade dysplasia or malignancy. The biopsy from the esophagus reports squamocolumnar mucosa with inflammatory changes suggestive of mild reflux esophagitis, negative for interstitial metaplasia. Gastric biopsy was benign and duodenal biopsy was also benign. There is no mention of H-pylori.     Patient was subsequently referred to colorectal surgeon Padmaja Ye MD for further evaluation. The patient had CT scan examination for chest, abdomen and pelvis requested by Dr. Ye and were done on 07/13/2019. The study reported no evidence of lymphadenopathy in the abdomen and pelvis. There was wall thickening of the rectosigmoid junction.  Normal spleen, pancreas, adrenal glands and kidneys. There was fatty liver infiltration but no focal lymphatic lesions. There was a small 6-7 mm noncalcified nodule in the right upper lobe and a tiny 3 mm subpleural nodule in the right middle lobe. No mediastinal or hilar lymphadenopathy.     Dr. Ye performed staging rectal ultrasound examination. This study reported tumor penetrating through the muscularis propria as T3 disease; there were no lymph nodes identified.    She had a hospitalization in late September 2019 because of newly discovered pulmonary emboli.  She was on prophylactic Lovenox prior to the incident of PE.  Now she is on full dose Xarelto anticoagulation.  Patient reports no further chest pain dyspnea.  She denies bleeding or bruising.  During her hospitalization, she was seen by Dr. Ye, who plans to have surgery 8 to 12 weeks after finishing radiation therapy.  She finished her radiation on 9/19/2019.     I noticed patient also presented to the emergency room on 10/1/2019 complaining of right flank area pain.  I reviewed the images studies and indeed she has a very small 1 to 2 mm obstructing kidney stone in the UV junction.  Patient is still symptomatic with some pains and dysuria.  She denies fever sweating or chills.    Laboratory study on 10/7/2019 reported normal CBC including hemoglobin 13.1, platelets 301,000, WBC 6170 and ANC 4900 lymphocytes 590 monocytes 480.      The nurse reported malfunction of port-a-catheter on 10/7/2019.  Port study on 10/8/2019 showed fibrin sheath around the distal aspect of the Mediport catheter in the SVC. This does not appear to hinder injection, but does prevent aspiration at this time.    Patient underwent surgical resection of the colon on 12/2/2019 with Dr. Ye.  Pathology evaluation reported residual T3 disease, also 1 out of 14 lymph nodes positive for malignancy.  So this patient in either had at least stage IIIb disease (T3 N1 M0?).       Adjuvant chemotherapy FOLFOX 6 will be started on 1/22/2020.  Laboratory study reported iron saturation 10%, free iron 31 TIBC 319 and ferritin 168.  Her hemoglobin was 11.8, WBC 5600, and platelets 347,000.    Patient was here on 02/12/2020 for cycle 2 of her FOLFOX.  This is delayed x1 week secondary to neutropenia.  The patient ultimately received 3 days worth of Neupogen with recovery of her blood counts.  Of note, the patient struggled with significant nausea without any episodes of vomiting following cycle #1 of chemotherapy resulting in significant weight loss.  She is up 12 pounds over the last week as her appetite has normalized.  We will add Emend to her care plan.    She is having several loose stools per her colostomy and has been hesitant to take Imodium due to prior history of constipation.  I encouraged her to try this with a maximum of 8 tablets/day.  She denies any infectious symptoms including fevers or chills.  No excess bleeding or bruising noted.  She had the expected cold sensitivity related to the Oxaliplatin for about 3 days following treatment.    Labs from 02/12/2020 demonstrated total white blood cell count of 5.14, ANC of 3.06, hemoglobin of 11.2, platelets of 211,000.  She was found to be iron deficient last week and is intolerant to oral iron secondary to GI distress.  For this reason, she initiated IV iron therapy with Injectafer last week.  She had received her second dose 02/12/2020    Patient has normalized hemoglobin 12.2 on 2/26/2020.    She reported on 5/5/2020 she had a recent visit to the emergency department for what was suspected to be an allergic reaction.  She called our on-call service on Saturday with reports of hand and face swelling.  She was instructed to proceed to the emergency department at which time she was assessed and prescribed a Medrol Dosepak.  She had just completed her 5-FU and was unhooked on Friday, 5/3/2020.  Her symptoms have improved.  She does  report persistent hyperpigmentation and mild swelling of the palms of the hands but this is much improved.  It was her right hand specifically that was swollen.  Her facial swelling has resolved.  She continues on Cefdinir nd since has 1 day remaining, she was prescribed cefdinir for a UTI requiring hospitalization at the end of April.  Reports no new symptoms.  Her labs are stable.  She is scheduled for treatment again.    Patient states at this time she is not tolerating her chemotherapy well.     Patient seen in the emergency department on 5/2/2020 for what was suspected to be an allergic reaction.  She called our on-call service on Saturday explaining that she was experiencing hand and face swelling.  She was instructed to proceed to the emergency department at which time she was assessed and prescribed a Medrol Dosepak.  She had just completed her 5-FU and was unhooked on Friday, 5/1/2020.      She reports since her ED visit on 5/2/2020 her symptoms have not improved. Her hands and feet were swollen upon presenting to the ED and she could barely make a fist. She states that she feels so swollen she is not able to stand it. She states that her skin on the hands and feet are peeling extensively as well, besides changing color to more dark.     She also reports significant fatigue after her first week of the 5-FU treatment but she expected this side effect. She also notices significant increase in her neuropathy to the point that she is not able to even walk around in her home without her house slippers due to irritation from her rugs. She denies and associated nausea or vomiting at this time. She also has episodes of epistaxis every day after the chemotherapy cycle #7.  She does report working full-time during the week of chemotherapy.    Laboratory studies, 5/13/2020, show moderate thrombocytopenia platelets 123,000, low normal WBC 4140 including ANC 2720 and normal hemoglobin 13.4.  Because significant  hand-foot syndrome, will decrease both 5-FU and oxaliplatin by 50%, and eliminate bolus dose of 5-FU.    On 5/21/2020 patient had a PET scan performed which showed no convincing evidence of residual disease in abdomen or pelvis, no metastatic disease within the chest or neck.     Cycle #8 FOLFOX 6 was given on 5/13/2020, with 50% dose reduction for both 5-FU and oxaliplatin, and also examination of bolus 5-FU.  She states on 5/27/2020 that with the recent reduction of the chemotherapy she feels significantly better. She has more energy and is able to do her daily routine.      PET scan examination on 5/21/2020 reported no evidence of metastatic disease in chest abdomen pelvis.  There was a stable 6 mm right upper lobe pulmonary nodule.    Laboratory studies on 5/27/2020 showed mild leukocytopenia WBC 3720 but a normal ANC 2250 and lymphocytes 630.  Normal platelets 163,000 and hemoglobin 12.6.  Chemistry lab reported normal renal function, liver function panel and a electrolytes, elevated glucose 164.    Laboratory studies 6/24/2020, showed normal hemoglobin 13.4 but macrocytosis .9.  Normal platelets 210,000 and WBC 5870.  She had normal CMP.     Patient last time was here in late June 2020.  Since that time she had surgical procedure for low anterior colon resection, coloanal anastomosis on 8/3/2020.  She later developed a perirectal abscess, required surgical drainage on 8/14/2020.  She was discharged on 8/27/2020.    Patient reports to me she still has lower abdominal wall vacuum suction in place.  She denies fever sweating chills.  Performance status is ECOG 1.  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  She does have visiting nurse come to the home for wound care.    CT scan for chest abdomen pelvis on 9/9/2020 reported a new 8 mm noncalcified pulmonary nodule in the left lower lobe.  Otherwise stable right upper lobe 6 mm pulmonary nodule, and no evidence of disease  recurrence in the abdomen or pelvis.     Laboratory study on 9/16/2020 reported elevated AST 55, ALT 95, alk phosphatase 143 but normal total bilirubin 0.3.  Chemistry lab otherwise unremarkable.  She has completed normal CBC.      Because of the abnormal CT scan examination for chest abdomen pelvis on 9/9/2020 reported a new 8 mm noncalcified pulmonary nodule in the left lower lobe, we obtained a PET scan examination for further evaluation, which was done on 9/18/2020.  This study reported several pulmonary nodules, some of them are hypermetabolic, newly developed compared to previous PET scan in May 2020.  Certainly does highly suspicious for metastatic disease.  There are also hypermetabolic activity in the abdomen and pelvic area which is hard to differentiate from surgical scar versus metastatic malignancy.    Laboratory study on 10/13/2020 reported normal CBC with Hb 13.9, platelets 302,000 and WBC 5520 including ANC 3830.  Chemistry lab reported normalized AST 20, alk phosphatase 116 improved ALT 35, and maintains normal bilirubin, with normal electrolytes and liver function panel.     Patient was started on first cycle of palliative chemotherapy FOLFIRI on 10/13/2020.    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  Patient had a new port placed 12/18/2020, and had held her Xarelto for 2 days prior to surgery.  She presented to the ER on 12/21/20 with complaints of facial and arm swelling for 3 days.  She also noted increased shortness of breath.  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  Thrombus within the right internal jugular vein cannot be excluded.  Patient was started on IV heparin, and eventually transitioned to weight-based Lovenox, which she now continues.    PET scan examination on 9/24/2021 reported  further shrinking of the tiny right upper lobe pulmonary nodule.  Otherwise no new lesions.  No evidence for metastatic disease in other areas.      Patient had CT scan for chest with IV contrast obtained on 12/14/2021 which reported small tiny stable pulmonary nodules. There is a 4 mm right upper lobe pulmonary nodule. There is also a stable 4 mm left lower lobe pulmonary nodule. There is also stable left upper lobe micronodule.     Laboratory studies today on 12/21/2021 reported normal hemoglobin 15.9 however .0, platelets 218,000 WBC 5360 including neutrophils 3730 lymphocytes 980. Chemistry lab reported normal renal function, electrolytes, glucose, and marginally elevated ALT 55, AST 34 but normal total bilirubin and alk phosphatase.     CT scan for chest abdomen pelvis 6/21/2022 reported sub-6 mm pulmonary nodules either stable or slightly smaller.  No new pulmonary nodules or evidence for disease progression.  There is no evidence for metastatic disease in the liver however it shows diffuse hepatic steatosis.  There was masslike thickening in the presacral space with the clips or calcification approximately 1.7 cm in greatest AP dimension but stable.    Laboratory study on 9/20/2022 reported normal hemoglobin 15.7 with mild macrocytosis .1.  She has normal CBC and platelets.  She also has unremarkable CMP.  Normal CEA 1.13 ng/mL.    Patient was seen by Dr. Ye, who performed ileostomy takedown on 11/14/2022.  Patient reports that she is recovering.  She still has a small open wound less than 1 cm in diameter, however close to 2 cm deep.  She has been changing dressing herself.  She denies fever sweating chills.    Patient has no other specific complaints.  She has excellent performance status ECOG 0.  She denies chest pain dyspnea cough hemoptysis.  No abdominal pain.  No melena hematochezia.  Patient has been eating well, stable weight.  She works full-time.    She continues Lovenox injections  and denies any significant bleeding issues.   No other new problems or concerns.     CT scan for chest abdomen pelvis obtained on 1/10/2023 reported stable small pulmonary nodules, no evidence for active or new disease.    Laboratory study on 1/10/2023 reported normal CBC and normal CMP.  CEA level is 0.99 ng/mL.    CT scan for chest, abdomen, and pelvis on 7/7/2023 reported stable small pulmonary nodules including a left upper lobe 5 mm nodule. There were no new masses, or pleural effusion. No enlarged supraclavicular, axillary, mediastinal, or hilar lymphadenopathy. Mediastinal vasculature normal. There is occlusion of the superior vena around the catheter with body wall  is present. There was no evidence for disease recurrence in the abdomen or pelvis. Bone is also negative for metastatic disease.    Laboratory studies obtained on 07/07/2023 also reported normal CBC, and normal CMP as well as low level CEA 1.07 ng/mL.    The CT scan for the chest on 10/27/2023 reported enlarging pulmonary nodules bilaterally. The right upper lobe lung nodule increased from 7 x 7 mm to 9 x 8 mm, and the left upper lobe nodule measured 8 x 9 mm from 5 x 6 mm in 04/2023. There is a different left upper lobe nodule 6 x 8 mm from previous 5 x 5 mm. The presacral tissue thickness measures up to 2.3 cm.    A laboratory study on 10/27/2023 reported CEA 1.58 ng/mL, normal CBC, and CMP.  Molecular study of peripheral blood Guardant Reveal is still pending.    The patient presents today on 11/17/2023 for reevaluation to discuss the results of PET scan examination as well as the results of tumor conference. I saw her recently on 11/03/2023 and because of enlarging pulmonary nodules on the CT scan, we requested a PET scan examination. I presented her case yesterday on 11/16/2023 at the [facility].    The patient reports she is at her baseline condition as 2 weeks ago. No specific complaints, no chest pain,  dyspnea, cough, etc. She has a regular bowel movement.    Her case was present at the [facility]. on 11/16/2023. The PET scan images and chest CT scan were reviewed in conjunction with her treatment history. The consensus was for patient to receive stereotactic radiation therapy for the right upper lobe lung lesion, and the bigger left upper lobe lesion, and monitor the smaller left upper lobe lesion with further images studies down the line. Also, the conference recommended Guardant Reveal study which we already ordered.     This Guardant Reveal study came back today as negative for ctDNA 0%.      CT of the chest on 2/2/2024 reported shrinking of the right upper lobe lesion from 9 mm to 6 mm, and the left upper lobe lesion also decreased from 9 mm to 6 mm. Otherwise, there are a couple of stable pulmonary nodules, 7 to 8 mm. The right hilar has a small lymph node that has increased from 6 mm to 8 mm and is otherwise stable. There was no suspicious lesion in the abdomen or pelvis.    Colonoscopy examination 9/11/2024 showed patchy mild inflammation characterized by congestion/edema in the descending colon. Evidence of previous endo coloanal anastomosis in the rectum. Presence of hemorrhoids.      MEDICATIONS    Current Outpatient Medications:     clonazePAM (KlonoPIN) 1 MG tablet, Take 1 tablet as needed daily for anxiety. May take one additional as needed for severe anxiety., Disp: 45 tablet, Rfl: 3    Cyanocobalamin (VITAMIN B-12 PO), Take 1 tablet by mouth 3 (Three) Times a Week. M-W-F, Disp: , Rfl:     dicyclomine (BENTYL) 20 MG tablet, TAKE 1 TABLET BY MOUTH EVERY 6 (SIX) HOURS AS NEEDED FOR IRRITABLE BOWEL SYMPTOMS, Disp: 360 tablet, Rfl: 2    diphenoxylate-atropine (LOMOTIL) 2.5-0.025 MG per tablet, TAKE 2 TABLETS BY MOUTH 4 TIMES A DAY, Disp: 240 tablet, Rfl: 5    enoxaparin sodium (LOVENOX) 100 MG/ML solution prefilled syringe syringe, INJECT 1 ML UNDER THE SKIN INTO THE APPROPRIATE AREA  "AS DIRECTED EVERY 12 (TWELVE) HOURS., Disp: 60 mL, Rfl: 2    escitalopram (LEXAPRO) 10 MG tablet, Take 1 tablet by mouth Daily., Disp: 90 tablet, Rfl: 1    famotidine (PEPCID) 20 MG tablet, TAKE 1 TABLET BY MOUTH TWICE A DAY, Disp: 180 tablet, Rfl: 2    HYDROcodone-acetaminophen (NORCO) 5-325 MG per tablet, Take 2 tablets by mouth Every 6 (Six) Hours As Needed for Moderate Pain., Disp: 90 tablet, Rfl: 0    hydrocortisone 2.5 % cream, APPLY RECTALLY 3 TIMES DAILY. INCLUDE APPLICATOR., Disp: , Rfl:     Hydrocortisone, Perianal, (ANUSOL-HC) 2.5 % rectal cream, Apply rectally 3 times daily.  Include applicator., Disp: 30 g, Rfl: 1    Loperamide HCl (IMODIUM PO), Take  by mouth As Needed., Disp: , Rfl:     Loratadine 10 MG capsule, Take 1 capsule by mouth Every Evening., Disp: , Rfl:     metoprolol succinate XL (TOPROL-XL) 25 MG 24 hr tablet, TAKE 0.5 TABLETS BY MOUTH EVERY NIGHT, Disp: 45 tablet, Rfl: 2    nystatin (MYCOSTATIN) 513214 UNIT/GM cream, Apply 1 Application topically to the appropriate area as directed 2 (Two) Times a Day., Disp: 30 g, Rfl: 2    nystatin-zinc oxide-hydrocortisone-bacitracin, Apply topically to the appropriate area as directed Daily As Needed., Disp: 60 g, Rfl: 3    octreotide (sandoSTATIN) 100 MCG/ML injection, INJECT 1 ML UNDER THE SKIN INTO THE APPROPRIATE AREA AS DIRECTED 3 TIMES A DAY, Disp: 90 mL, Rfl: 2    ondansetron (ZOFRAN) 8 MG tablet, TAKE 1 TABLET BY MOUTH THREE TIMES A DAY AS NEEDED FOR NAUSEA AND VOMITING, Disp: 60 tablet, Rfl: 1    pantoprazole (PROTONIX) 40 MG EC tablet, TAKE 1 TABLET BY MOUTH EVERY DAY, Disp: 90 tablet, Rfl: 1    rosuvastatin (CRESTOR) 5 MG tablet, Take 1 tablet by mouth Daily., Disp: 90 tablet, Rfl: 0    Syringe/Needle, Disp, 27G X 1/2\" 1 ML misc, Use as directed for octreotide injections, Disp: 100 each, Rfl: 3    nitrofurantoin, macrocrystal-monohydrate, (Macrobid) 100 MG capsule, Take 1 capsule by mouth 2 (Two) Times a Day., Disp: 8 capsule, Rfl: 0    " "potassium chloride (KLOR-CON M10) 10 MEQ CR tablet, Take 1 tablet by mouth Daily., Disp: 20 tablet, Rfl: 0  No current facility-administered medications for this visit.    ALLERGIES:   No Known Allergies    SOCIAL HISTORY:       Social History     Tobacco Use    Smoking status: Every Day     Current packs/day: 1.00     Average packs/day: 1 pack/day for 25.0 years (25.0 ttl pk-yrs)     Types: Cigarettes     Passive exposure: Current    Smokeless tobacco: Never    Tobacco comments:     1 PPD/caffeine use    Vaping Use    Vaping status: Some Days    Substances: Nicotine    Devices: Disposable    Passive vaping exposure: Yes   Substance Use Topics    Alcohol use: Not Currently     Comment: RARELY    Drug use: Never         FAMILY HISTORY:   Mother has positive factor V Leiden mutation, although she did not have thrombosis, mother also has coronary disease with stenting, she also has occasional bruising.    Maternal grandmother had DVT, she had multiple surgical procedures.    Patient mother's half-brother had metastatic colon cancer at diagnosis in his 50s.    Maternal great aunt had breast cancer.           Vitals:    04/23/25 1046   BP: 122/80   Pulse: 105   Resp: 14   Temp: 97.3 °F (36.3 °C)   TempSrc: Temporal   SpO2: 99%   Weight: 76.8 kg (169 lb 4.8 oz)   Height: 167.6 cm (66\")   PainSc: 3    PainLoc: Rectum             4/23/2025    10:46 AM   Current Status   ECOG score 0     PHYSICAL EXAM  GENERAL:  Well-developed, well-nourished female in no acute distress.  Pallor noted  SKIN:  Warm, dry without rashes, purpura or petechiae.  HEENT:  Normocephalic.   LYMPHATICS:  No cervical, supraclavicular or axillary adenopathy.  CHEST: Normal respiratory effort.  Lungs clear to auscultation. Good airflow.  CARDIAC:  Regular rate and rhythm. Normal S1,S2.  ABDOMEN:  Soft, no tender.  Bowel sounds normal.  EXTREMITIES:  No lower extremity edema.    RECENT LABS:  Results from last 7 days   Lab Units 04/23/25  1022   WBC " 10*3/mm3 6.67   NEUTROS ABS 10*3/mm3 4.41   HEMOGLOBIN g/dL 14.5   HEMATOCRIT % 43.8   PLATELETS 10*3/mm3 193       Results from last 7 days   Lab Units 04/23/25  1022   SODIUM mmol/L 143   POTASSIUM mmol/L 3.2*   CHLORIDE mmol/L 106   CO2 mmol/L 21.9*   BUN mg/dL 5*   CREATININE mg/dL 0.58   CALCIUM mg/dL 8.8   ALBUMIN g/dL 3.6   BILIRUBIN mg/dL 0.2   ALK PHOS U/L 103   ALT (SGPT) U/L 13   AST (SGOT) U/L 15   GLUCOSE mg/dL 125*   MAGNESIUM mg/dL 2.0           IMAGING:    CT Chest With Contrast Diagnostic (01/31/2025 11:46)     ASSESSMENT:   1.  Metastatic rectal cancer.   Initial rectal ultrasound examination reported T3N0 disease without lymphadenopathy.   CT scan of chest, abdomen and pelvis reported no lymphadenopathy in the abdomen, pelvis or chest. She does have small pulmonary nodule 6-7 mm and 2 mm with indeterminate feature. There is no solid evidence of metastatic disease otherwise.   Based on the CT scan and rectal ultrasound imaging studies, this patient had stage IIA (T3N0M0) disease.   She had PET scan examination on 8/8/2019 which reported a hypermetabolic right upper lobe pulmonary nodule 6 mm with SUV 5.6.  This is suspicious for metastatic disease however it is too small to be biopsied.  This patient may have stage IV disease.   She initiated concurrent radiation with continuous 5-FU on 8/12/2019.  Patient finished concurrent chemoradiation therapy.  Patient underwent surgical resection of the rectal tumor and diverting loop ileostomy on 12/2/2019 with Dr. Ye.  Pathology evaluation reported residual T3 disease, with 1 out of 14 lymph nodes positive for malignancy.  Certainly this patient has at least stage IIIb rectal cancer (T3N1M0?)  On 1/22/2020 adjuvant chemotherapy FOLFOX 6 regimen initiated.   On 2/5/2022 cycle #2 was delayed secondary to neutropenia.  She was given 3 days of Granix.   Emend added with cycle 3.  With improved nausea control  Continuing home Granix x3 days following 5-FU  disconnect  3/11/2020 due for cycle 4, however, she is experiencing progressive abdominal pain and occasional fevers.   CT scan performed on 3/13/2020 reveals no evidence of progressive or recurrent disease.  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  Patient hospitalized 4/24-4/26/20 after cycle 5 of chemotherapy with acute UTI.  CT abdomen/pelvis noting fluid collection in the presacral region having diminished in size compared to CT dated 3/13/2020.  Patient was evaluated by Dr. Ye while in the hospital with further plans to evaluate possible colovaginal fistula following completion of chemotherapy.  Patient did respond to IV antibiotics and discharged home on oral cefdinir.  4/29/2020 cycle 6 of FOLFOX.  Urinary symptoms have resolved   Patient seen in the [facility] ED on 5/2/2020 for what was suspected to be an allergic reaction.  She called our service on Saturday explaining that she was experiencing hand and face swelling.  She was instructed to proceed to the emergency department at which time she was assessed and prescribed a Medrol Dosepak.  She had just completed her 5-FU and was unhooked on Friday, 5/1/2020.  Her symptoms have resolved in the office on assessment, 5/5/2020.  The patient had grade 3 hand-foot syndrome based on symptomology.  Patient had cycle #8 of 5-FU on 5/13/2020. Due to her symptoms and poor tolerance to the 5-FU, her treatment dose will be reduced 50% for oxaliplatin and infusional 5-FU.  Bolus 5-FU will be discontinued..  On 5/21/2020 patient had a PET scan and it showed no evidence of of metastatic disease in the neck, chest, abdomen or pelvis.  There was fluid accumulation/abscess.   On 5/27/2020 discussed with the patient that we can discontinue chemotherapy at this time.  She will follow-up with Dr. Ye to discuss a possibility to reverse the ileostomy.  We likely will obtain anther PET scan in 3 to 4 months for reassessment.    Patient was seen by Dr. Ye on 6/19/2019  for evaluation and to discuss possible take down of her ileostomy.  She is scheduled to have a gastrografin enema on 7/2/2020 to evaluate for a fistula, and then a colonoscopy on 7/15/2020, both done by Dr. Ye.  She states that based on the above imaging and procedure findings, she may have a more extensive surgery done or just have her ileostomy reversed, which would likely be done in August 2020.  This will all be coordinated under the care of Dr. Ye.     CT scan from 09/09/2020 and also compared to her previous PET scan examination from 05/21/2020 and also the original PET scan from 08/09/2019.  The original PET scan there is a small right upper lobe pulmonary nodule 6 mm with SUV 5.6. So in the 05/2020 PET scan the nodule is still there but activity seems much less and this most recent CT scan examination also documented the preexisting small pulmonary nodule however there is a new left lower lobe pulmonary nodule 8 mm in size and I shared with the patient today this nodule is suspicious for metastatic disease. Laboratory studies reported minimal CEA level 1.32 on 09/09/2020. Liver function panel was only marginally abnormal with the ALT 45, the remaining is normal. However laboratory study today showed worsening AST 55, ALT 95 and alkaline phosphatase 143 but is still normal, total bilirubin 0.3.   Recommended to have repeat PET scan examination for assessment because the left lower lobe pulmonary nodule is too small to be biopsied, and if the PET scan reports hypermetabolic lesion, I recommended to have stereotactic radiation therapy. Explained to patient that she is not a really good candidate to have thoracotomy for resection because she still has unhealed wound in the abdomen. Stereotactic radiation therapy will likely be a more feasible choice.   PET scan examination obtained on 9/18/2020 showed metastatic disease.  It reported a few hypermetabolic pulmonary nodules, and some new small pulmonary  nodules, all of them highly suspicious for metastatic disease.  She also has hypermetabolic activity in the abdomen and pelvic area which could be related to scar tissue from her recent surgery versus metastatic disease.  Nevertheless overall picture fits with metastatic cancer.  Discussed with the patient on 9/20/2020, we reviewed the PET scan images together, and I recommended to have systematic chemotherapy, I do not think stereotactic reading therapy to the hypermetabolic lesions in the lungs warranted at this time because even those will be treated with reading therapy, she will still need systematic chemotherapy.  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  I did discuss with the patient using anti-EGFR monoclonal antibody versus anti-VEGF monoclonal antibody.     Palliative chemotherapy cycle#1 FOLFIRI was started on 10/13/2020.  No bolus 5-FU given considering previous poor tolerance.    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   Discussed with the patient 10/27/2020, because of the K-asskia mutation, she is not a candidate for anti-EGFR monoclonal antibody such as Erbitux or vectibix.  She could be a candidate for anti-VEGF monoclonal antibody, however because of active wound, she is not a candidate right now at this moment.  Patient seen in the ED for acute right neck pain on 11/16/2020.  CT angiogram as well as CT of the cervical spine performed with no acute findings but notably one of her pulmonary nodules, left upper lobe, somewhat decreased in size.  Patient given prednisone pack.  Further details below.  Cycle #6 chemotherapy will be resumed on 1/5/2021.  Started back on original irinotecan.  PET scan examination obtained on 1/12/2021 after cycle #6 chemotherapy reported improved pulmonary nodule hypermetabolic activity.  Confirmed these are metastatic lesions.  Patient is evaluated  1/19/2020, will continue cycle #7 FOLFIRI chemotherapy.  However Avastin will be on hold see next section.   Patient was reevaluated on 2/2/2021, will continue chemotherapy cycle #8 FOLFIRI.  Avastin / biosimilar will be added.    She talked to [facility] specialist in mid February 2021, and had recommended palliative chemotherapy without surgical intervention of metastatic lung lesions.   On 3/2/2021, patient will proceed with cycle #10 FOLFIRI plus bevacizumab.  After 12 cycles, plan to start maintenance treatment.  3/16/2021 cycle 11.  Plus bevacizumab.  3/30/2021 cycle 12 FOLFIRI plus bevacizumab.  Having issues with worsening nausea despite premeds with dexamethasone, Aloxi, Emend, and Zofran at home.  Patient is requesting a dose of Phenergan, which is helped her in the past.  We will give this to her with treatment today.  PET scan examination on 4/6/2021 reported no evidence of hypermetabolic metastatic lesion.  Discussed with the patient on 4/13/2021, we reviewed the PET scan results.  We recommended to switch to maintenance chemotherapy without irinotecan.  We will continue 5-FU and Avastin every 2 weeks.  We discussed possibility of switching to oral Xeloda treatment.  Discussed with the patient for side effects more so with hand-foot syndrome.  Patient is agreeable.   Xeloda 2000 mg twice daily 7 days on, 7 days off along with Avastin initiated 4/27/2021.  After only 5 doses of Xeloda significant hand-foot syndrome, Xeloda held. Patient requesting to be transitioned back to infusional 5-FU, as she felt that she tolerated infusional 5-FU without any problem.  The patient will receive 5-FU leucovorin and Avastin every 2 weeks.  Xeloda discontinued.  5/25/2021 continued cycle #4 maintenance 5-FU, leucovorin, Avastin tolerating this much better so far, we will continue this. Recommended to have 12 cycles of maintenance chemotherapy, then obtain PET scan for reevaluation.  We could  discuss further treatment plan at that time.  9/14/2021 patient due for cycle 12 of additional maintenance 5-FU/leucovorin plus Avastin.  She continues to tolerate treatment well overall.  She is anxious in the office today with her Mediport not getting blood at first and also with upcoming scans being heavy on her mind.  She was instructed to take one of her Klonopin pills while here to help calm her mind.  Heart rate did improve from 140s down to 112.  Proceed with treatment today as scheduled.  PET scan examination on 9/24/2021 reported further shrinking of the right upper lobe tiny pulmonary nodule.  No other new lesions.  Discussed with patient on 9/24/2021 about further shrinking of the right upper lobe pulmonary nodule and no evidence for other new lesions. We recommended to have chemo break for 3 months with repeated CT scan for reevaluation.  Recent CT scan for chest 12/14/2021 and abdomen CT from 11/29/2021 reported no evidence for active disease. The right upper lobe pulmonary nodule, left lower lobe pulmonary nodule minimal about 4 mm and stable. I discussed with patient today on 12/21/2021, recommended to give her another 3 months chemotherapy holiday and obtain CT scan afterwards for reevaluation. After discussion, patient is agreeable.   CT scan examination for chest abdomen and pelvis obtained on 3/15/2022 reported a slight increase of the left upper lobe pulmonary nodule 5 mm, from previous 3 mm.  The other pulmonary nodules were stable in size.  There is also small left upper lobe groundglass changes.   Dr. Nguyen reviewed images studies with the patient 3/23/2022, compared to multiple previous images including CT chest CT and PET scan, suspected disease progression.  Discussed with the patient, and I recommended to resume maintenance chemotherapy with 5-FU plus leucovorin plus Avastin repeating every 2 weeks, and obtaining CT scan for reassessment in 3 months.  Patient is agreeable.  Patient  resumed maintenance chemotherapy on 3/29/2022 with 5-FU leucovorin and Avastin.   4/26/2022, proceed with cycle #27 maintenance 5-FU, leucovorin, and Avastin.  Patient continues to tolerate treatment well.    On 5/10/2022, we will proceed ahead with cycle #28 maintenance chemotherapy.  Plan to have CT scan after cycle #30.  5/24/2022 cycle 29 maintenance chemotherapy.  Continue to tolerate well.  6/7/2022 cycle #30 maintenance chemotherapy, continuing to tolerate well.   CT scan for chest abdomen pelvis with IV contrast obtained on 6/21/2022 reported sub-6 mm pulmonary nodules either stable or slightly smaller.  We reviewed the images with the patient together.  We discussed with the patient and recommended to hold chemotherapy for now with repeating CT scan in 3 months for reassessment.  CT scan of the chest abdomen pelvis 9/13/2022 reported stable condition, no evidence for recurrent or metastatic colon cancer.  On 1/10/2023 patient had a CT chest abdomen pelvis with reported no evidence for disease progression.  Laboratory study also showed normal CEA.   Patient had a CT scan for chest, abdomen, and pelvis obtained on 04/11/2023. This study reported very small pulmonary nodules, the right upper lobe nodule is stable to 6 mm, however, the left upper lobe and the left lower lobe nodule increased to 5 mm from previous 4 mm. I discussed with the patient today on 04/19/2023, I recommend to obtain another CT scan in 3 months for reassessment. The nodules are so small, they likely will not be picked up by PET scan examination.  CT scan examination of the chest, abdomen, and pelvis obtained on 7/7/2023 reported stable small pulmonary nodules. No evidence for disease progression or new lesions in chest, abdomen, and pelvis.  Discussed with patient today on 7/14/2023, however, patient is concerning for disease progression. I recommended to obtain Guardant Reveal for circulating tumor DNA study. If the study is positive, we  will further obtain PET scan examination, otherwise, we will obtain CT scan for chest, abdomen, and pelvis in 3 months for reassessment.  The patient had CT scan for the chest, abdomen, and pelvis obtained on 10/27/2023, which reported worsening pulmonary nodules at bilateral upper lobes. Laboratory studies showed low normal CEA level and normal liver function panel. The Guardant Reveal study is still pending. I discussed this with the patient on 11/03/2023 and we shared the images, and I recommended having a PET scan examination for further assessment. I will present her case at the multimodality lung conference. If those lesions are deemed to be metastatic lesions, I recommend having stereotactic radiotherapy. I discussed it with the patient, and she voiced understanding and was agreeable with that strategy.  I presented her case at the multimodality chest conference 11/16/2023.  The consensus was for patient to receive stereotactic radiation therapy for the right upper lobe lung lesion, and the bigger left upper lobe lesion, and monitor the smaller left upper lobe lesion with further images studies down the line. Also, the conference recommended Guardant Reveal study which we already ordered. I discussed with the patient today on 11/17/2023, we explained to the patient that the opinion of the conference and she is agreeable with this and prefers this strategy because of limited toxicity compared to long term commitment to starting chemotherapy again. I recommend to obtain a CT scan for the chest, abdomen, and pelvis with both IV and oral contrast for reassessment in 3 months for reassessment of metastatic lung lesions and thickness in the pelvis where the PET scan reported a low activity SUV 2.4 in the surgical bed.   The patient had steroid-induced radiation therapy for the right upper lobe and one of the left upper lobe lesions.  Her CT scan examination on 02/02/2024 reported shrinking nodules of the right  upper lobe and one of the left upper lobe lesions, both from 9 mm down to 6 mm. There are 2 stable pulmonary nodules 7 mm. Nothing new.  02/09/2024, I discussed with the patient and recommended CT scan for the chest, abdomen, and pelvis in 3 months for reassessment. Guardant Reveal study was 0% ctDNA. We will also continue laboratory studies every 3 months for Guardant Reveal, together with routine labs, CBC, CMP, and CEA.  CT scan of the chest, abdomen, and pelvis conducted on 4/23/2024 revealed stable multiple pulmonary nodules, with no other new pulmonary nodules or evidence of disease recurrence or abnormal pelvis. The patient's liver function panel was normal, and low-level CEA level was also noted. The Guardant Reveal study was able to be obtained earlier due to regulatory rules, and we hugo obtain today the Guardant reveal study, be available within 10 to 14 days.   Given the patient's metastatic liver lesion, and lung lesions from colon cancer, careful monitoring is necessary. A chest CT scan with IV contrast will be arranged in 3 months for reevaluation. The study will be reviewed again in 3 months, which will include CBC, CMP, and CEA level.   Imaging study with chest CT scan on 08/02/2024 reported multiple bilateral pulmonary nodules that are relatively stable. However, the Guardant Reveal reported positive ctDNA indicating disease progression. A subsequent PET scan examination on 08/14/2024 reported newly developed hypermetabolic pulmonary nodules. The highest SUV is 3.7, corresponding to an 8 mm new right upper lobe nodule, and there are several other hypometabolic nodules in the lungs.   8/21/2024 resumption of chemotherapy with 5-FU bevacizumab  Triage visit 8/28/2024 with intractable diarrhea that was unclear if this is secondary to chemotherapy or infectious etiology given her known chronic colitis, fever and abdominal pain.  Further management as outlined below  9/4/2024 per review today diarrhea  has improved though she is having some difficulty with passage of gas and stool,?  Related to significant inflammation in the rectum versus tumor obstruction.  The passage of gas has improved in the last few days but she does still have to change positions on the toilet to get stool to pass without it being painful.  Discussed all of this with Dr. Nguyen and we will refer the patient urgently back to Dr. Ye for at the very least rectal examination in the office versus full repeated colonoscopy.  Because of the potential for examination or invasive procedures, we will hold bevacizumab today.  Because the patient had significant diarrhea last week and concerns that it may be related to chemotherapy we will decrease her 5-FU/leucovorin today by 30%.  If she does well we can go back to full dose with cycle 3.     9/18/2024 she presented for evaluation prior to cycle #3 of palliative chemotherapy with 5-FU, leucovorin, and bevacizumab. Given her recent biopsy on 09/11/2024, it is prudent to postpone the Avastin treatment today to ensure safety.  Chemotherapy will be proceeded.      She presented for evaluation on 10/02/2024, tolerating chemotherapy except for diarrhea. This has been managed with octreotide. Proceed with cycle 4 chemotherapy today with 5-FU, leucovorin and resumption of bevacizumab.   Reevaluation 10/16/2024 due for cycle 5 5-FU, leucovorin, bevacizumab.  Due to ongoing significant diarrhea, bevacizumab will be placed on hold for potential surgical intervention.  Additionally, 5-FU will be dose reduced to 50%.  Additional adjustments as outlined below  Patient reviewed 10/30/2024 with improved tolerance to cycle five 5-FU, leucovorin.  We we will attempt to slightly escalate 5-FU dosing, increasing by 10% (40% dose reduction)  Patient did experience leakage of 5-FU, and return 10/31/2024 for evaluation of this.  She was sent for port dye study that showed correct location of her port, so 5-FU was  resumed.  The patient's right rib pain is likely due to a new chair on the bone, as indicated by the PET scan. The PET scan revealed a new 2.8 x 1.3 cm groundglass opacity in the lateral aspect of the left apex with SUV 6.3. This appearance is nonspecific and may represent an infectious or inflammatory infiltrate. Continued monitoring and follow-up imaging are recommended.  The current regimen of 5-FU will be maintained, and Avastin will be reintroduced with full dose. 11/13/2024 will proceed ahead with 5-FU, leucovorin and bevacizumab.   12/4/2024 She also reports two bad days of diarrhea, likely due to Avastin. The dosage of Avastin will be reduced to 4 mg/kg. The 5-FU dosage will be increased to 1680 mg/m².   12/18/2024 proceed with 5-FU and Avastin continuing previous dose reductions as she had excellent tolerance to treatment 2 weeks ago.   12/31/2024 patient reports tolerating chemotherapy with the same dose adjustment.  This is a 1 day early because of the closure of the New Year's day holiday tomorrow.  Next cycle will be resumed in 15 days back to her usual schedule.  Will plan to have CT scan for chest abdomen pelvis with IV contrast in early February 2024.  1/29/2025 proceed with 5-FU and bevacizumab maintaining previous dosing.  CT scan for chest abdomen pelvis on 1/31/2025 reported stable small pulmonary nodules.  No evidence for disease progression.  Due to profound diarrhea from chemotherapy, we decided to postpone next chemotherapy to 3/5/2025.    3/5/2025 patient has improved diarrhea. Chemotherapy will be resumed with reduction of 5-FU at 1440 mg/m² and bevacizumab at 3 mg/kg and the leucovorin at 240 mg/m².  She will continue octreotide 100 mcg every 8 hours for chemotherapy-induced diarrhea, along with Imodium and Lomotil as needed.   3/19/2025 proceed with chemotherapy at previous dosing.  She tolerated most recent cycle of chemotherapy very well.    on 4/9/2025 patient presented for evaluation.   Laboratory studies reported normal CBC and unremarkable CMP.  Patient has been generally tolerating well except for some issue with her diarrhea.  We recommended to continue with the current dosage of Avastin and 5-FU with leucovorin. A scan is scheduled for mid-May 2025 for reassessment of her disease.  4/23/2025 hold chemotherapy due to profound diarrhea and weight loss following most recent cycle of treatment      2.  Intractable diarrhea due to chemotherapy.   Patient developed diarrhea on Saturday, 8/24/2024.  Is unclear if this is related to infection as she did have fevers at the time the diarrhea began or chemotherapy.  She does have chronic colitis noted on most recent PET scan, this therefore could be diverticulitis flare  GI panel and C. difficile negative  8/29/2024 she did receive a single dose of octreotide  Begin empiric Flagyl 500 mg 3 times daily x 10 days  8/30/2024 discussed negative stool studies.  We will add Levaquin to already prescribed Flagyl to complete management of diverticulitis.  It is unclear if the patient's symptoms are related to diverticular flare given her previous abdominal pain and fever versus chemotherapy.  However, her symptoms are currently improved  9/4/2024 diarrhea has resolved.  Stools are now more soft with some form but she is having difficulty with passage of stool, having to change positions on the toilet to avoid pain.  We are referring back to Dr. Ye as detailed above.  She will finish out the Flagyl and Levaquin as prescribed having roughly 3 days left of both.  We will also adjust doses of 5-FU/leucovorin today with concern for potential chemotherapy-induced diarrhea.  If she does well this cycle we can increase back to full dose with cycle 3 per Dr. Nguyen.  On 9/18/2024 patient reports that she experienced significant diarrhea during cycle #2 chemotherapy on 09/04/2024, leading to a 30% dose reduction. Despite taking Lomotil 2 tablets four times a day,  Imodium, and Pepto-Bismol, her diarrhea persisted. She received octreotide shots twice, which improved her symptoms from watery to mushy stools but did not fully resolve the issue. She will continue with the current regimen and consider adding octreotide to her home regimen if diarrhea starts at home. The potential side effects of octreotide, including nausea, were discussed.   10/1/2024 she reports using octreotide self-injection at home, which has significantly improved her diarrhea. Most of the time, she only requires it once a day and occasionally twice a day, depending on the severity of her diarrhea. She is satisfied with the results, which have improved her quality of life and ability to eat properly.   She will also use Lomotil and the Imodium as needed.  Patient is very tearful in the office 10/16/2024 regarding debilitating diarrhea and interference with quality of life.  She questions potential surgical intervention and placement of colostomy due to ongoing pain in her rectum and burning of her skin from diarrhea.  We discussed adjustments today including increased dose of octreotide to 200 mcg 3 times daily for diarrhea.  Chemotherapy dose adjustments.  Diarrhea was slightly improved following most recent cycle of chemotherapy.  Continue supportive care  12/4/2024 patient reports that Avastin may be the agent causing her diarrhea.  So we will decrease dose by 20% and to see how things going.  Improved diarrhea with dose reduction to Avastin.  Continue previous dosing.   Continue to be improved with continued octreotide 100 mg 3 times daily.  She also has Lomotil and Imodium to utilize for breakthrough symptoms.   4/9/2025 patient had some issues with diarrhea from chemotherapy.  It also caused irritation of her hemorrhoids.  Overall stable.  We recommended continue octreotide 3 times a day.   Unfortunately, the patient has had a recent flare of her diarrhea.  She is maximizing octreotide, Lomotil and  Imodium.  She does note her bowels have slightly improved in the past few days.  Stool specimen today was negative for C. difficile.    3.   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  Low-dose Lovenox discontinued and initiated Xarelto 20 mg daily.  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  Patient held Xarelto for 2 days prior to MediPort removal from the left chest wall and placement of new port in the right chest wall on 12/18/2020.  She resumed Xarelto that evening.  Progressive swelling in the neck, face, upper extremities prompting hospitalization with CT scan showing thrombosis in the right subclavian vein and SVC.  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  Although she had been off of Xarelto for a few days, clearly Xarelto was not prohibiting progression of her thrombosis after she resumed treatment and there was some evidence to suggest thrombosis had been present at least in the innominate vein on prior scan in September but now appears chronic.  Current acute thrombosis involving SVC and right subclavian.  Patient was admitted and placed on heparin drip  Patient was evaluated by vascular surgery and intervention was not recommended for thrombolysis/thrombectomy.  On 12/22/2020, the patient developed worsening shortness of breath, increasing upper extremity edema consistent with worsening SVC syndrome.  Repeat CT angiogram chest  was performed early on 12/23/2020 with findings of stable SVC and brachiocephalic vein thrombosis, no evidence of pulmonary embolism.  On 2/2/2021, we discussed that side effects of Avastin/biosimilar related to thrombosis.  Since this patient already has been on Lovenox, I think the benefits from treatment for her cancer will outweigh that risk of thrombosis, will proceed ahead with Avastin.  I cautioned patient to watch out for signs of worsening thrombosis patient voiced understanding.   5/11/2021 Lovenox dosing will be adjusted due to patient's weight loss.  We discussed she needs 1 mg/kg.  Her syringes are 100 mg syringes she will do 0.9 mL subcu every 12 hours.  8/3/2021 Patient continues to have bruising on abdomen from lovenox injections.  Will need to monitor weight and adjust dosing in future if further weight loss.   Stable condition on 9/28/2021.  Continue Lovenox anticoagulation.    Patient reports no chest pain no dyspnea no leg edema. However she had bruising and also most recently abnormal small abscess for which she actually went to ER on 11/29/2021, had I&D. The wound is healing. No further drainage. Denies fever sweating or chills. After discussion, she will continue Lovenox injection for now.   On 3/23/2022, patient reports good tolerance of Lovenox.  Nevertheless she still has small quantity of pus in the left lower abdomen abscess, she squeezes it every day to prevent it became worse.  She reports no fever sweating chills.  Recommended to start Augmentin 875 mg twice a day for 7 days.  She will continue Lovenox indefinitely.  On 4/12/2022, patient reports resolution of the small abscess at left lower abdominal wall.   Patient had CTA again on 11/2/2024 due to continued pleuritic pain that was negative for PE.  Continue Lovenox 1 mg/kg twice daily without signs or symptoms of bleeding, no signs or symptoms of thrombosis.      4.  Mild anemia.   She also has history of iron deficiency.  Iron  studies reveal iron saturation of 10%.  She was started on oral iron but was unable to tolerate due to GI side effects.   Status post Injectafer 2/5/2020 and 2/12/2020   Improved hemoglobin 13.5 on 1/5/2021.  3/16/2020 when hemoglobin now up to 16.2, hematocrit 47.6.  Patient admits that she has started smoking again, and I have encouraged her to quit.  She denies any snoring or sleep apnea diagnosis.  Patient is not taking oral iron.  We will closely monitor.  3/30/2020 hemoglobin 15.7, HCT 47.5.  Patient states that she has cut back significantly on her smoking, although not quit yet.  I have encouraged her to continue working on this.  We will continue to closely monitor.  Laboratory studies 6/22/2021 reported excellent folate > 20 ng/mL, however low normal B12 level 357 pg/mL.    On 7/6/2021, normal hemoglobin 15.8, .9 and HCT 47.2%.  Patient was asked to start oral vitamin B12 at 1000 mcg daily.   Lab study on 12/14/2021 reported excellent ferritin 296 and iron saturation 25% with free iron 104 TIBC 424. Hemoglobin was 15.2 with .2.   Normal hemoglobin 14.6 on 3/15/2022.  Iron study reported normal ferritin 129.5 ng/mL however slightly low iron saturation 11%.  Continue to monitor for now.  On 1/29/2025 hemoglobin is normal at 14.5    5.  Peripheral neuropathy secondary to chemotherapy.   This is mild involving bilateral hands and feet as reported on 9/16/2020.   Will avoid chemotherapy with oxaliplatin.  Stable mild neuropathy as of 11/10/2020  Patient reports worsening peripheral neuropathy and cycle #5 chemotherapy on 12/8/2020 with and without irinotecan.   On 1/5/2021, patient reports improvement of peripheral neuropathy, will add irinotecan back to chemotherapy.  Continue to monitor and adjust medication.  Stable.      6.  Depression.  Patient seen by JULIET Davenport on 11/9/2020.  She was started on mirtazapine.  Lexapro was discontinued.  This has definitely improved her mood with the  patient feeling overall much better.  She is sleeping better.  Appetite is improved with her actually eating more than she wants to.    Condition is stable.  She plans to continue follow-up with supportive oncology.      7. Malfunction of the portal catheter  Patient had a PowerPort placement on the left upper chest by Dr. Joseph on 8/9/2019.  Patient reports there was no blood drawn from the portal catheter. CT scan of the chest on 7/7/2023 reported occlusion of the SVC around to the Port-A-Cath tubing. I recommend trying activase to see if it helps.  Otherwise, we may have to remove the catheter. Clinically, patient has no signs of SVC syndrome.  On 11/03/2023, the patient reports that her Port-A-Cath was able to be used for a CT scan for the injection of intravenous dye; however, there was no blood return, so we needed to draw from her arm for the blood test. We will continue to keep Port-A-Cath for now.  On 02/09/2024, the patient reports that the port was able to be flushed. However, no blood was returned. We will continue to flush every 6 weeks.  Port dye study 10/31/2024 showing correct position of port.  Fibrin sheath present.  No issue today.      8.  This patient also has strong family history for malignancy   The patient had appointment with genetic counseling on September 3, 2019.  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    9. Hypertension.  Continue management of hypertension and follow up with PCP.      10.  Chest and back pain  Ports this has been ongoing for about 4 weeks.  Started in her right chest/rib cage.  Originally this pain had completely resolved, however returned when she received her next chemotherapy infusion. She felt like the pain worsened after she was unhooked from her 5-FU and that the pain spread to her right scapular/back area.  Since unhook she has continued with pain in her right chest/back that has required her to take pain medicine regularly which is not typical for her.   She has pain with deep inspiration and pain with certain movements.  She also has pain when she pushes on the tissue around her port, though there is no redness or warmth around her port site aside from red rash in the shape of a bandaid and she reports being sensitive to bandaids.  Reviewed all of patients symptoms with Dr. Nguyen who recommended obtaining doppler right upper extremity and PET scan for further evaluation of this severe right sided pain that has been evaluated by CTA without any signs of what could be causing her pain.  Of note she was seen in the ED 11/2/2024, had CT chest performed which was negative for PE.  EKG and troponin looked okay.  She was discharged home.    Healing fracture of the right 2nd rib. The PET scan on 11/12/2024 showed new hypermetabolic activity at a healing fracture on the anterior aspect of the right second rib with SUV 5.7. The patient should avoid activities that may exacerbate the pain and consider pain management options as needed.    12/4/2024 the patient reports improvement in rib pain, with the ability to lay on the affected side, though still experiencing some soreness and pain upon sneezing or deep breathing.    Today 4/9/2025 She reports her ribs fracture has subsided, with the last one healing about a week ago. She has had 7 rib fractures in total, with 1 reinjury. The fractures occurred from various incidents, including falling down the stairs and landing hard, twisting over to get something off her bedside table, or leaning down to  objects.  Carole Weaver reports a pain score of 3.  Given her pain assessment as noted, treatment options were discussed and the following options were decided upon as a follow-up plan to address the patient's pain: continuation of current treatment plan for pain.      *Hypokalemia related to diarrhea  IV potassium replacement provided in the infusion area today.  Begin potassium 10 mEq once daily    *Urinary tract  infection  4/22/2025 patient reported urinary symptoms.  We initially prescribed Omnicef, however, the patient had a prescription for Macrobid at home and initiated this.  Tolerating Macrobid with improvement in her symptoms and without worsening diarrhea.  Therefore, we will cancel the Omnicef prescription and instead prescribe an additional 4 days of Macrobid to complete a 5-day course.    PLAN:    Hold chemotherapy today due to significant diarrhea and weight loss  Will send for C. difficile today and found to be negative  1 L normal saline given in the infusion area today along with 20 mEq potassium  Begin potassium 10 mEq once daily  Macrobid 100 mg twice daily electronically prescribed x 4 days.  The patient is already taken 1 day of a home prescription  She will continue octreotide 100 mcg every 8 hours for chemotherapy-induced diarrhea, along with Imodium and Lomotil as needed.   Continue Lovenox subcutaneously every 12 hours for anticoagulation.  Continue Magic barrier cream as needed.   Continue Norco 5/325 as needed for pain.  1 to 2 tablets.  The patient was encouraged to utilize this for her musculoskeletal pain following her fall  Continue Protonix 40 mg daily.   Continue Gas-X.   Return in 2 weeks for CBC, CMP, and for nurse practitioner follow-up and continued 5-FU, leucovorin and Avastin  Also in 2 weeks obtain laboratory studies guardant reveal for circulating tumor DNA.  Obtain CT for chest abdomen pelvis with IV contrast in 3 weeks to reassess her metastatic rectal cancer.  Dr. Nguyen will see patient in 4 weeks for reevaluation, to discuss results of CT scan imaging, and ongoing management plan.  If stable or febrile response, will continue current chemotherapy 5-FU leucovorin and Avastin.  Labs per protocol.    The patient is on a high risk medication requiring close monitoring for toxicity. Todays plan of care was reviewed with Dr. Nguyen who is in agreement.     I spent 42 minutes caring for  Carole on this date of service. This time includes time spent by me in the following activities: preparing for the visit, reviewing tests, obtaining and/or reviewing a separately obtained history, performing a medically appropriate examination and/or evaluation, counseling and educating the patient/family/caregiver, ordering medications, tests, or procedures, referring and communicating with other health care professionals, documenting information in the medical record, and care coordination.       Patience Lorenzo, APRN  04/23/2025        CC:  Deepika Vela III NP-C   MD Alverto Bowers MD

## 2025-04-23 NOTE — PROGRESS NOTES
OUTPATIENT ONCOLOGY NUTRITION     Patient Name: Carole Weaver  YOB: 1980  MRN: [id number]  Assessment Date: 4/23/2025    COMMENTS: Follow up for pt with metastatic rectal cancer, hx colostomy with reversal (has no rectal vault).  Pt is receiving IVF's and potassium replacement today, her #16 of FOLFOX is being held.  Labs/Meds from today reviewed. K+ 3.2. Weight down 10lbs x 2 weeks.     Met with pt today in the infusion center. She reports her appetite is fine but not eating much due to the severe diarrhea she had since 4/17. She was using lomotil, imodium, octreotide and drank Enterade bid and then qd and didn't have much relief. Her bottom is raw. Encouraged her to continue drinking the Enterade bid and see if it helps her.  She is to send in a stool for Cdiff as well. Provided her with some Biolyte packets to help her with hydration during this time.    Will follow throughout treatment course and be available as needed. Pt very appreciative of visit.        Reason for Assessment Follow up     Diagnosis/Problem   Metastatic Rectal Cancer   Treatment Plan Chemotherapy  FOLFOX   Frequency Q 2 weeks   Goal of cancer treatment Disease control   Comments:          Encounter Information        Nutrition/Diet History:  Regular diet, doesn't eat much protein, mostly carbs   Oral Nutrition Supplements:    Factors/Symptoms Affecting Intake: Diarrhea really bad this time   Comments: Picky eater     Medical/Surgical History Past Medical History:   Diagnosis Date    Abdominopelvic abscess 08/12/2020    ADMITTED TO [facility]    Abnormal Pap smear of cervix 02/02/1998    JULIUS I    Abscess of abdominal wall 11/28/2012    SEEN AT [facility] ER    Anemia in pregnancy     Anxiety     Bilateral epiphora 04/2020    Bilateral hand swelling 05/02/2020    SEEN AT [facility] ER    Cervical lymphadenitis 06/06/2012    SEEN AT [facility] ER    Chemotherapy induced diarrhea 01/2021    Chemotherapy induced neutropenia 04/2020    Chemotherapy-induced  nausea 02/2020    Chemotherapy-induced thrombocytopenia 05/2020    Chronic anticoagulation     Chronic diarrhea     Colon polyps     FOLLOWED BY DR. 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 ALEXANDRU HUNT    History of gestational diabetes     History of pre-eclampsia 1998    History of pre-eclampsia     History of radiation therapy 2019    FOLLOWED BY DR. 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    Perirectal abscess 08/12/2020    Perirectal abscess 08/12/2020    Added automatically from request for surgery [id number]      Pulmonary embolism  without acute cor pulmonale 09/20/2019    X 3; ADMITTED TO [facility]    Pulmonary nodule, right 2020    Rectal cancer 2019    STAGE IIA, INVASIVE MODERATELY DIFFERENTIATED ADENOCARCINOMA, CHEMO AND XRT FINISHED 2019    Right shoulder pain 2020    SEEN AT [facility] ER    Right ureteral stone 10/01/2019    SEEN AT [facility] ER    Right ureteral stone 10/01/2019    SEEN AT [facility] ER      SAB (spontaneous ) 1996     A2-1 INDUCED    Sciatica     Sepsis due to cellulitis 2002    LEFT GREAT TOE, ADMITTED TO [facility]    Tachycardia 2020    Thrombosis of superior vena cava 2020    AND BRACHIOCEPHALIC VEIN, ADMITTED TO [facility]    Urinary urgency 2020       Past Surgical History:   Procedure Laterality Date    ABDOMINAL SURGERY      CHOLECYSTECTOMY N/A 10/10/2003    LAPAROSCOPIC WITH CHOLANGIOGRAM, DR. JAMEY TALAVERA AT [facility]    COLON RESECTION N/A 2019    Procedure: laparoscopic low anterior colon resection with mobilization of splenic flexure and diverting loop ileostomy: ERAS;  Surgeon: Padmaja Esparza MD;  Location: [facility] OR;  Service: General    COLON RESECTION N/A 2020    Procedure: LOW ANTERIOR COLON RESECTION, COLOANAL ANASTOMOSIS, MOBILIZATION SPLENIC FLEXURE;  Surgeon: Padmaja Esparza MD;  Location: [facility] MAIN OR;  Service: General;  Laterality: N/A;    COLONOSCOPY N/A 07/15/2020    PATENT ANASTAMOSIS IN MID RECTUM, RESCOPE IN 1 YR, DR. PADMAJA ESPARZA AT [facility]    COLONOSCOPY N/A 2022    DIFFUSE AREA OF MODERATELY FRIABLE MUCOSA IN ENTIRE COLON , DIVERSION COLITIS, PATENT AND HEALTHY ANASTAMOSIS, DR. PADMAJA ESPARZA AT [facility]    COLONOSCOPY N/A 2023    6 MM SESSILE SERRATED ADENOMA POLYP IN DESCENDING, PATENT ANASTAMOSIS IN DISTAL RECTUM, RESCOPE IN 2 YRS, DR. PADMAJA ESPARZA AT [facility]    COLONOSCOPY W/ POLYPECTOMY N/A 2019    15 MM TUBULOVILLOUS ADENOMA POLYP IN HEPATIC FLEXURE, 20 MMTUBULOVILLOUS ADENOMA WITH HIGH GRADE DYSPLASIA IN RECTOSIGMOID, 4 CM MASS IN MID  RECTUM, PATH: INVASIVE MODERATELY DIFFERENTIATED ADENOCARCINOMA, DR. JENNIFER LI AT [facility]    DILATATION AND EVACUATION N/A 2009    ENDOSCOPY N/A 07/08/2019    LA GRADE A ESOPHAGITIS, GASTRITIS, ALL BIOPSIES BENIGN, DR. JENNIFER LI AT [facility]    ILEOSTOMY CLOSURE N/A 11/14/2022    Procedure: ILEOSTOMY TAKEDOWN;  Surgeon: Geronimo Ye MD;  Location: [facility] OR;  Service: General;  Laterality: N/A;    INCISION AND DRAINAGE PERIRECTAL ABSCESS N/A 08/14/2020    Procedure: INCISION AND DRAINAGE OF retrorectal dehiscence abcess with drain placement and irrigation;  Surgeon: Geronimo Ye MD;  Location: [facility] OR;  Service: General;  Laterality: N/A;    PAP SMEAR N/A 01/24/2014    SIGMOIDOSCOPY N/A 07/24/2019    INFILTRATIVE PARTIALLY OBSTRUCTING LARGE RECTAL CANCER, AREA TATOOED, DR. GERONIMO YE AT [facility]    SIGMOIDOSCOPY N/A 11/23/2019    AN ULCERATED PARTIALLY OBSTRUCTING MASS IN MID RECTUM, AREA TATTOOED, DR. GERONIMO YE AT [facility]    SIGMOIDOSCOPY N/A 07/22/2021    PATENT ANASTAMOSIS IN DISTAL RECTUM, RESCOPE IN 1 YR, DR. GERONIMO YE AT [facility]    SIGMOIDOSCOPY N/A 09/11/2024    PATCHY INFLAMMATION AND EDEMA IN DESCENDING, AREA BIOPSIED AND WAS BENIGN, PATENT AND HEALTHY ANASTAMOSIS, HEMORRHOIDS,RESCOPE(CY) 12/2025, DR. GERONIMO YE AT [facility]    TRANSRECTAL ULTRASOUND N/A 07/24/2019    Procedure: ULTRASOUND TRANSRECTAL;  Surgeon: Geronimo Ye MD;  Location: [facility] ENDOSCOPY;  Service: General    URETEROSCOPY LASER LITHOTRIPSY WITH STENT INSERTION Right 10/30/2019    DR. ESTUARDO BEASLEY AT [place]    VAGINAL DELIVERY N/A 09/18/1998    VENOUS ACCESS DEVICE (PORT) INSERTION Left 08/09/2019    Procedure: INSERTION VENOUS ACCESS DEVICE;  Surgeon: Sj Joseph MD;  Location: [facility] OR [facility];  Service: General    VENOUS ACCESS DEVICE (PORT) INSERTION N/A 12/18/2020    Procedure: INSERTION VENOUS ACCESS DEVICE right side, removal venous access device left side;  Surgeon: Jake  "MD Sj;  Location: [facility];  Service: General;  Laterality: N/A;    WISDOM TOOTH EXTRACTION Bilateral 1993               Anthropometrics        Current Height Ht Readings from Last 1 Encounters:   04/23/25 167.6 cm (66\")      Current Weight Wt Readings from Last 1 Encounters:   04/23/25 76.8 kg (169 lb 4.8 oz)      BMI  27.3   Usual Body Weight (UBW) 200lb 6 months ago   Weight Change/Trend Loss   Weight History Wt Readings from Last 30 Encounters:   04/23/25 1046 76.8 kg (169 lb 4.8 oz)   04/09/25 1139 81.3 kg (179 lb 4.8 oz)   03/21/25 1048 81.7 kg (180 lb 3.2 oz)   03/19/25 0824 81.6 kg (180 lb)   03/05/25 0851 82.1 kg (181 lb)   02/13/25 1345 80.4 kg (177 lb 3.2 oz)   01/29/25 1131 82.8 kg (182 lb 9.6 oz)   01/15/25 1043 86 kg (189 lb 8 oz)   12/31/24 0927 85.8 kg (189 lb 3.2 oz)   12/18/24 0910 85.4 kg (188 lb 3.2 oz)   12/10/24 1148 85 kg (187 lb 8 oz)   12/04/24 1017 85.2 kg (187 lb 12.8 oz)   11/13/24 0919 86.7 kg (191 lb 3.2 oz)   11/08/24 1345 87 kg (191 lb 11.2 oz)   10/30/24 0926 86.5 kg (190 lb 9.6 oz)   10/22/24 0518 87.1 kg (192 lb)   10/16/24 0934 89 kg (196 lb 1.6 oz)   10/02/24 1019 87 kg (191 lb 14.4 oz)   09/18/24 0830 87.1 kg (192 lb)   09/11/24 1118 88 kg (194 lb)   09/04/24 0856 90.8 kg (200 lb 3.2 oz)   08/30/24 1203 89.5 kg (197 lb 4.8 oz)   08/29/24 1230 87.7 kg (193 lb 6.4 oz)   08/28/24 1429 87 kg (191 lb 14.4 oz)   08/21/24 0840 89.8 kg (198 lb)   08/16/24 1529 90.2 kg (198 lb 12.8 oz)   08/14/24 1253 92.5 kg (204 lb)   07/08/24 1111 92.5 kg (204 lb)   06/26/24 0831 92.6 kg (204 lb 3.2 oz)   05/03/24 1448 92.3 kg (203 lb 6.4 oz)          Medications           Current medications: Cyanocobalamin, HYDROcodone-acetaminophen, Hydrocortisone (Perianal), Loperamide HCl, Loratadine, Syringe/Needle (Disp), clonazePAM, dicyclomine, diphenoxylate-atropine, enoxaparin sodium, escitalopram, famotidine, hydrocortisone, metoprolol succinate XL, nitrofurantoin (macrocrystal-monohydrate), " "nystatin, nystatin-zinc oxide-hydrocortisone-bacitracin, octreotide, ondansetron, pantoprazole, potassium chloride, and rosuvastatin                 Tests/Procedures        Tests/Procedures Chemotherapy     Labs       Pertinent Labs    Results from last 7 days   Lab Units 04/23/25  1022   SODIUM mmol/L 143   POTASSIUM mmol/L 3.2*   CHLORIDE mmol/L 106   CO2 mmol/L 21.9*   BUN mg/dL 5*   CREATININE mg/dL 0.58   CALCIUM mg/dL 8.8   BILIRUBIN mg/dL 0.2   ALK PHOS U/L 103   ALT (SGPT) U/L 13   AST (SGOT) U/L 15   GLUCOSE mg/dL 125*     Results from last 7 days   Lab Units 04/23/25  1022   MAGNESIUM mg/dL 2.0   HEMOGLOBIN g/dL 14.5   HEMATOCRIT % 43.8   WBC 10*3/mm3 6.67   ALBUMIN g/dL 3.6     Results from last 7 days   Lab Units 04/23/25  1022   PLATELETS 10*3/mm3 193     COVID19   Date Value Ref Range Status   12/22/2020 Not Detected Not Detected - Ref. Range Final     No results found for: \"HGBA1C\"       Physical Findings        Physical Appearance alert, oriented     Edema  no edema   Gastrointestinal diarrhea, stool frequency   Tubes/Lines/Drains Implantable Port   Oral/Mouth Cavity WNL   Skin Other: anal area raw from diarrhea       Estimated/Assessed Needs        Energy Requirements    Height for Calculation      Weight for Calculation 82 kg   Method for Estimation  25 kcal/kg   EST Needs (kcal/day) 2050 kcals per day       Protein Requirements    Weight for Calculation 82 kg   EST Protein Needs (g/kg) 1.2 gm/kg   EST Daily Needs (g/day) 98 gms per day       Fluid Requirements     Method for Estimation 1 mL/kcal    Estimated Needs (mL/day) 2000 mLs per day         NUTRITION INTERVENTION / PLAN OF CARE      Intervention Goal(s) Maintain nutrition status, Reduce/improve symptoms, Provide information, Meet estimated needs, Disease management/therapy, Tolerate PO , Maintain intake, and Maintain weight         RD Intervention/Action Encouraged intake, Follow Tx progress, Recommended/ordered         Recommendations:     "   PO Diet Consume calorie and protein dense soluble fiber healthy foods, 6 small meals      Supplements Boost or Ensure if needed      Snacks       Other Enterade bid when having diarrhea   New Prescription Ordered? No, recommend   --      Monitor/Evaluation Follow up as needed   Education Education provided, Will provide eduction as needed   --    Electronically signed by:  Kelley Gilman RD  04/23/25 12:40 EDT

## 2025-04-27 DIAGNOSIS — G89.3 CANCER ASSOCIATED PAIN: ICD-10-CM

## 2025-04-28 RX ORDER — HYDROCODONE BITARTRATE AND ACETAMINOPHEN 5; 325 MG/1; MG/1
2 TABLET ORAL EVERY 6 HOURS PRN
Qty: 90 TABLET | Refills: 0 | Status: SHIPPED | OUTPATIENT
Start: 2025-04-28

## 2025-05-14 ENCOUNTER — HOSPITAL ENCOUNTER (OUTPATIENT)
Facility: HOSPITAL | Age: 45
Discharge: HOME | End: 2025-05-14
Admitting: INTERNAL MEDICINE
Payer: COMMERCIAL

## 2025-05-14 DIAGNOSIS — C20 ADENOCARCINOMA OF RECTUM: ICD-10-CM

## 2025-05-14 DIAGNOSIS — C78.00 MALIGNANT NEOPLASM METASTATIC TO LUNG, UNSPECIFIED LATERALITY: ICD-10-CM

## 2025-05-14 PROCEDURE — 74177 CT ABD & PELVIS W/CONTRAST: CPT

## 2025-05-14 PROCEDURE — 71260 CT THORAX DX C+: CPT

## 2025-05-14 PROCEDURE — 25510000002 DIATRIZOATE MEGLUMINE & SODIUM PER 1 ML: Performed by: INTERNAL MEDICINE

## 2025-05-14 PROCEDURE — 25510000001 IOPAMIDOL 61 % SOLUTION: Performed by: INTERNAL MEDICINE

## 2025-05-14 RX ORDER — DIATRIZOATE MEGLUMINE AND DIATRIZOATE SODIUM 660; 100 MG/ML; MG/ML
30 SOLUTION ORAL; RECTAL
Status: COMPLETED | OUTPATIENT
Start: 2025-05-14 | End: 2025-05-14

## 2025-05-14 RX ORDER — IOPAMIDOL 612 MG/ML
100 INJECTION, SOLUTION INTRAVASCULAR
Status: COMPLETED | OUTPATIENT
Start: 2025-05-14 | End: 2025-05-14

## 2025-05-14 RX ADMIN — DIATRIZOATE MEGLUMINE AND DIATRIZOATE SODIUM 30 ML: 600; 100 SOLUTION ORAL; RECTAL at 09:37

## 2025-05-14 RX ADMIN — IOPAMIDOL 85 ML: 612 INJECTION, SOLUTION INTRAVENOUS at 10:42

## 2025-05-19 DIAGNOSIS — G89.3 CANCER ASSOCIATED PAIN: ICD-10-CM

## 2025-05-19 RX ORDER — HYDROCODONE BITARTRATE AND ACETAMINOPHEN 5; 325 MG/1; MG/1
2 TABLET ORAL EVERY 6 HOURS PRN
Qty: 90 TABLET | Refills: 0 | Status: SHIPPED | OUTPATIENT
Start: 2025-05-19

## 2025-05-21 ENCOUNTER — OFFICE VISIT (OUTPATIENT)
Dept: ONCOLOGY | Facility: CLINIC | Age: 45
End: 2025-05-21
Payer: COMMERCIAL

## 2025-05-21 ENCOUNTER — INFUSION (OUTPATIENT)
Dept: ONCOLOGY | Facility: HOSPITAL | Age: 45
End: 2025-05-21
Payer: COMMERCIAL

## 2025-05-21 ENCOUNTER — TELEPHONE (OUTPATIENT)
Dept: SURGERY | Facility: CLINIC | Age: 45
End: 2025-05-21
Payer: COMMERCIAL

## 2025-05-21 VITALS
HEIGHT: 66 IN | TEMPERATURE: 97.5 F | SYSTOLIC BLOOD PRESSURE: 129 MMHG | OXYGEN SATURATION: 96 % | RESPIRATION RATE: 17 BRPM | WEIGHT: 176.9 LBS | HEART RATE: 98 BPM | BODY MASS INDEX: 28.43 KG/M2 | DIASTOLIC BLOOD PRESSURE: 85 MMHG

## 2025-05-21 DIAGNOSIS — C20 ADENOCARCINOMA OF RECTUM: ICD-10-CM

## 2025-05-21 DIAGNOSIS — E87.6 HYPOKALEMIA: ICD-10-CM

## 2025-05-21 DIAGNOSIS — C78.00 MALIGNANT NEOPLASM METASTATIC TO LUNG, UNSPECIFIED LATERALITY: ICD-10-CM

## 2025-05-21 DIAGNOSIS — T45.1X5D ADVERSE EFFECT OF ANTINEOPLASTIC AND IMMUNOSUPPRESSIVE DRUGS, SUBSEQUENT ENCOUNTER: ICD-10-CM

## 2025-05-21 DIAGNOSIS — Z79.899 ENCOUNTER FOR LONG-TERM (CURRENT) USE OF HIGH-RISK MEDICATION: ICD-10-CM

## 2025-05-21 DIAGNOSIS — C20 ADENOCARCINOMA OF RECTUM: Primary | ICD-10-CM

## 2025-05-21 DIAGNOSIS — R19.7 INTRACTABLE DIARRHEA: ICD-10-CM

## 2025-05-21 DIAGNOSIS — Z45.2 ENCOUNTER FOR FITTING AND ADJUSTMENT OF VASCULAR CATHETER: Primary | ICD-10-CM

## 2025-05-21 DIAGNOSIS — E86.0 DEHYDRATION: ICD-10-CM

## 2025-05-21 DIAGNOSIS — Z79.01 ANTICOAGULATION ADEQUATE: ICD-10-CM

## 2025-05-21 LAB
ALBUMIN SERPL-MCNC: 3.8 G/DL (ref 3.5–5.2)
ALBUMIN/GLOB SERPL: 1.5 G/DL
ALP SERPL-CCNC: 100 U/L (ref 39–117)
ALT SERPL W P-5'-P-CCNC: 13 U/L (ref 1–33)
ANION GAP SERPL CALCULATED.3IONS-SCNC: 8.3 MMOL/L (ref 5–15)
AST SERPL-CCNC: 17 U/L (ref 1–32)
BASOPHILS # BLD AUTO: 0.01 10*3/MM3 (ref 0–0.2)
BASOPHILS NFR BLD AUTO: 0.2 % (ref 0–1.5)
BILIRUB SERPL-MCNC: 0.2 MG/DL (ref 0–1.2)
BILIRUB UR QL STRIP: NEGATIVE
BUN SERPL-MCNC: 6 MG/DL (ref 6–20)
BUN/CREAT SERPL: 10.3 (ref 7–25)
CALCIUM SPEC-SCNC: 8.7 MG/DL (ref 8.6–10.5)
CHLORIDE SERPL-SCNC: 105 MMOL/L (ref 98–107)
CLARITY UR: ABNORMAL
CO2 SERPL-SCNC: 25.7 MMOL/L (ref 22–29)
COLOR UR: ABNORMAL
CREAT SERPL-MCNC: 0.58 MG/DL (ref 0.57–1)
DEPRECATED RDW RBC AUTO: 52.1 FL (ref 37–54)
EGFRCR SERPLBLD CKD-EPI 2021: 113.9 ML/MIN/1.73
EOSINOPHIL # BLD AUTO: 0.14 10*3/MM3 (ref 0–0.4)
EOSINOPHIL NFR BLD AUTO: 2.7 % (ref 0.3–6.2)
ERYTHROCYTE [DISTWIDTH] IN BLOOD BY AUTOMATED COUNT: 14.2 % (ref 12.3–15.4)
GLOBULIN UR ELPH-MCNC: 2.5 GM/DL
GLUCOSE SERPL-MCNC: 135 MG/DL (ref 65–99)
GLUCOSE UR STRIP-MCNC: NEGATIVE MG/DL
HCT VFR BLD AUTO: 42.6 % (ref 34–46.6)
HGB BLD-MCNC: 13.7 G/DL (ref 12–15.9)
HGB UR QL STRIP.AUTO: ABNORMAL
IMM GRANULOCYTES # BLD AUTO: 0.01 10*3/MM3 (ref 0–0.05)
IMM GRANULOCYTES NFR BLD AUTO: 0.2 % (ref 0–0.5)
KETONES UR QL STRIP: NEGATIVE
LEUKOCYTE ESTERASE UR QL STRIP.AUTO: ABNORMAL
LYMPHOCYTES # BLD AUTO: 1.13 10*3/MM3 (ref 0.7–3.1)
LYMPHOCYTES NFR BLD AUTO: 22 % (ref 19.6–45.3)
MCH RBC QN AUTO: 31.9 PG (ref 26.6–33)
MCHC RBC AUTO-ENTMCNC: 32.2 G/DL (ref 31.5–35.7)
MCV RBC AUTO: 99.1 FL (ref 79–97)
MONOCYTES # BLD AUTO: 0.41 10*3/MM3 (ref 0.1–0.9)
MONOCYTES NFR BLD AUTO: 8 % (ref 5–12)
NEUTROPHILS NFR BLD AUTO: 3.43 10*3/MM3 (ref 1.7–7)
NEUTROPHILS NFR BLD AUTO: 66.9 % (ref 42.7–76)
NITRITE UR QL STRIP: NEGATIVE
NRBC BLD AUTO-RTO: 0 /100 WBC (ref 0–0.2)
PH UR STRIP.AUTO: 6.5 [PH] (ref 4.5–8)
PLATELET # BLD AUTO: 185 10*3/MM3 (ref 140–450)
PMV BLD AUTO: 9.7 FL (ref 6–12)
POTASSIUM SERPL-SCNC: 3.9 MMOL/L (ref 3.5–5.2)
PROT SERPL-MCNC: 6.3 G/DL (ref 6–8.5)
PROT UR QL STRIP: NEGATIVE
RBC # BLD AUTO: 4.3 10*6/MM3 (ref 3.77–5.28)
SODIUM SERPL-SCNC: 139 MMOL/L (ref 136–145)
SP GR UR STRIP: 1.02 (ref 1–1.03)
UROBILINOGEN UR QL STRIP: ABNORMAL
WBC NRBC COR # BLD AUTO: 5.13 10*3/MM3 (ref 3.4–10.8)

## 2025-05-21 PROCEDURE — 36591 DRAW BLOOD OFF VENOUS DEVICE: CPT

## 2025-05-21 PROCEDURE — 99215 OFFICE O/P EST HI 40 MIN: CPT | Performed by: INTERNAL MEDICINE

## 2025-05-21 PROCEDURE — 85025 COMPLETE CBC W/AUTO DIFF WBC: CPT

## 2025-05-21 PROCEDURE — 80053 COMPREHEN METABOLIC PANEL: CPT

## 2025-05-21 PROCEDURE — 81003 URINALYSIS AUTO W/O SCOPE: CPT

## 2025-05-21 PROCEDURE — 25010000002 HEPARIN LOCK FLUSH PER 10 UNITS: Performed by: INTERNAL MEDICINE

## 2025-05-21 RX ORDER — SODIUM CHLORIDE 0.9 % (FLUSH) 0.9 %
10 SYRINGE (ML) INJECTION AS NEEDED
Status: DISCONTINUED | OUTPATIENT
Start: 2025-05-21 | End: 2025-05-21 | Stop reason: HOSPADM

## 2025-05-21 RX ORDER — CHOLESTYRAMINE 4 G/9G
1 POWDER, FOR SUSPENSION ORAL
Qty: 90 PACKET | Refills: 2 | Status: SHIPPED | OUTPATIENT
Start: 2025-05-21 | End: 2025-06-04 | Stop reason: HOSPADM

## 2025-05-21 RX ORDER — HEPARIN SODIUM (PORCINE) LOCK FLUSH IV SOLN 100 UNIT/ML 100 UNIT/ML
500 SOLUTION INTRAVENOUS AS NEEDED
Status: DISCONTINUED | OUTPATIENT
Start: 2025-05-21 | End: 2025-05-21 | Stop reason: HOSPADM

## 2025-05-21 RX ADMIN — Medication 10 ML: at 09:08

## 2025-05-21 RX ADMIN — Medication 500 UNITS: at 09:08

## 2025-05-21 NOTE — PROGRESS NOTES
REASON FOR FOLLOW UP:     *. Rectal cancer, rectal ultrasound examination in July 2019 reported T3N0 disease without lymphadenopathy. She does have small pulmonary nodule 6-7 mm and 2 mm with indeterminate feature. There is no solid evidence of metastatic disease otherwise. Patient has stage IIA (T3N0M0) disease.  Post neoadjuvant chemoradiation therapy, patient underwent surgical resection of the primary rectal cancer by Dr. Ye on 12/2/2019.  Pathology evaluation reported residual T3N1 disease stage IIIb.   PET scan examination on 9/18/2020 reported multiple hypermetabolic small pulmonary nodules/ new pulmonary nodules.   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 It reported tumor mutation burden 5 Muts/Mb, microsatellite stable, TP53 mutation R282W, and others.   Patient had a telemedicine evaluation at the [facility] [place] in February 2021.  They agreed with our treatment plan for palliative chemotherapy followed by maintenance chemotherapy.     *.The patient has heterozygous factor V Leiden, was on prophylactic anticoagulation with Lovenox 40 mg daily given her increased risk of thrombosis with MediPort and GI malignancy.   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  Patient was switched to weight-based Lovenox injection.      HISTORY OF PRESENT ILLNESS:  The patient is a 44 y.o. female with the above-mentioned history, who is seen today for evaluation.     History of Present Illness  The patient presented on 05/21/2025 for metastatic colorectal cancer. She is here to discuss the results of her CT scan and to discuss further management plan.    Her last chemotherapy session with 5-FU plus bevacizumab was on 04/09/2025. She has not undergone chemotherapy for the past 1.5 months, because of significant diarrhea. She has not consulted with Dr. Ye  since her sigmoidoscopy. She was scheduled for a colonoscopy in 12/2024 but it was postponed due to her ongoing chemotherapy. She has regained her appetite and has gained 6 pounds since her last visit.       She has a history of colovaginal fistula, which required surgical intervention.  She reports no presence of stool in her vagina.     She reports a decrease in the frequency of diarrhea, but notes that she has not had a solid or formed bowel movement for the past 1.5 months. Her bowel movements are characterized by intermittent periods of diarrhea, followed by a day without any bowel movement, and then a return to mushy diarrhea. She experiences severe rectal pain during these episodes, which last for 2 days, necessitating frequent baths. She also reports uncontrollable flatulence with an unpleasant odor. Despite being off chemotherapy for 1.5 months, she continues to experience these symptoms. She has been using Imodium and Lomotil without success. She has not attempted to manage these symptoms with fiber supplements.     She has a history of cholecystectomy performed in her early 20s.     She went to immediate care a few weeks ago for a stye on her eyelid, which caused her whole eye to swell shut and leak discharge. She was put on an antibiotic. A week or two before that, she was on an antibiotic for a UTI.    PAST SURGICAL HISTORY:  Cholecystectomy in early 20s  Colovaginal fistula repair    Results  Labs   - White blood cells count: 05/21/2025, 5130   - Neutrophils count: 05/21/2025, 3430   - Hemoglobin: 05/21/2025, 13.7   - Platelets count: 05/21/2025, 185,000   - Glucose level: 05/21/2025, 135, otherwise WNL.   - Urine test: 05/21/2025, Trace amount of leukocytes    Imaging   - CT scan for chest, abdomen, and pelvis with i.v. contrast: 05/14/2025, Stable small pulmonary nodules up to 6 mm. No interval changes in several sub-6 mm pulmonary nodules in the right upper lobe. The left lower lobe nodule was 6 mm.  No pleural or pericardial effusion. No lymphadenopathy. No evidence for metastatic disease in the abdomen.          Past Medical History:   Diagnosis Date    Abdominopelvic abscess 08/12/2020    ADMITTED TO [facility]    Abnormal Pap smear of cervix 02/02/1998    JULIUS I    Abscess of abdominal wall 11/28/2012    SEEN AT [facility] ER    Anemia in pregnancy     Anxiety     Bilateral epiphora 04/2020    Bilateral hand swelling 05/02/2020    SEEN AT [facility] ER    Cervical lymphadenitis 06/06/2012    SEEN AT [facility] ER    Chemotherapy induced diarrhea 01/2021    Chemotherapy induced neutropenia 04/2020    Chemotherapy-induced nausea 02/2020    Chemotherapy-induced thrombocytopenia 05/2020    Chronic anticoagulation     Chronic diarrhea     Colon polyps     FOLLOWED BY DR. 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 ALEXANDRU HUNT    History of gestational diabetes     History of pre-eclampsia 1998    History of pre-eclampsia     History of radiation therapy 2019    FOLLOWED BY DR. JAVON LEWIS    History of TB skin testing 01/17/2009    TB Skin Test    History of TB skin testing 01/17/2009    TB Skin Test    History of transfusion 2019    12/2019    HPV in female 1998    Hypokalemia 09/2019    Hypomagnesemia 09/2019    Hyponatremia 06/2021    IBS (irritable bowel syndrome)     Ileostomy present 04/25/2020    Take down on 11/3/2022    Infected insect bite of neck 05/27/2016     SEEN AT [facility]    Kidney stone on right side 10/07/2019    Kidney stones 2007    SEEN AT [facility] ER    Low anterior resection syndrome 2022    FOLLOWED BY DR. PADMAJA ESPARZA    Lump of right breast     SWOLLEN LYMPH NODE    Lung cancer 2020    METASTATIC LUNG CANCER    Macrocytosis 2021    Monopolar depression     On anticoagulant therapy     Pain associated with surgical procedure 2020    Palmar-plantar erythrodysesthesia 2021    Perirectal abscess 2020    Perirectal abscess 2020    Added automatically from request for surgery [id number]      Pulmonary embolism without acute cor pulmonale 09/20/2019    X 3; ADMITTED TO [facility]    Pulmonary nodule, right 2020    Rectal cancer 2019    STAGE IIA, INVASIVE MODERATELY DIFFERENTIATED ADENOCARCINOMA, CHEMO AND XRT FINISHED 2019    Right shoulder pain 2020    SEEN AT [facility] ER    Right ureteral stone 10/01/2019    SEEN AT [facility] ER    Right ureteral stone 10/01/2019    SEEN AT [facility] ER      SAB (spontaneous ) 1996     A2-1 INDUCED    Sciatica     Sepsis due to cellulitis 2002    LEFT GREAT TOE, ADMITTED TO [facility]    Tachycardia 2020    Thrombosis of superior vena cava 2020    AND BRACHIOCEPHALIC VEIN, ADMITTED TO [facility]    Urinary urgency 2020   Patient had COVID-19 infection diagnosed on 2022 despite was fully vaccinated and boosted.  She recovered without problem.      Past Surgical History:   Procedure Laterality Date    ABDOMINAL SURGERY      CHOLECYSTECTOMY N/A 10/10/2003    LAPAROSCOPIC WITH CHOLANGIOGRAM, DR. JAMEY TALAVERA AT [facility]    COLON RESECTION N/A 2019    Procedure: laparoscopic low anterior colon resection with mobilization of splenic flexure and diverting loop ileostomy: ERAS;  Surgeon: Padmaja Esparza MD;  Location: [facility];  Service: General    COLON RESECTION N/A 2020    Procedure: LOW ANTERIOR COLON RESECTION, COLOANAL ANASTOMOSIS, MOBILIZATION SPLENIC  FLEXURE;  Surgeon: Padmaja Esparza MD;  Location: [facility] MAIN OR;  Service: General;  Laterality: N/A;    COLONOSCOPY N/A 07/15/2020    PATENT ANASTAMOSIS IN MID RECTUM, RESCOPE IN 1 YR, DR. PADMAJA ESPARZA AT [facility]    COLONOSCOPY N/A 11/03/2022    DIFFUSE AREA OF MODERATELY FRIABLE MUCOSA IN ENTIRE COLON , DIVERSION COLITIS, PATENT AND HEALTHY ANASTAMOSIS, DR. PADMAJA ESPARZA AT [facility]    COLONOSCOPY N/A 12/04/2023    6 MM SESSILE SERRATED ADENOMA POLYP IN DESCENDING, PATENT ANASTAMOSIS IN DISTAL RECTUM, RESCOPE IN 2 YRS, DR. PADMAJA ESPARZA AT [facility]    COLONOSCOPY W/ POLYPECTOMY N/A 07/08/2019    15 MM TUBULOVILLOUS ADENOMA POLYP IN HEPATIC FLEXURE, 20 MMTUBULOVILLOUS ADENOMA WITH HIGH GRADE DYSPLASIA IN RECTOSIGMOID, 4 CM MASS IN MID RECTUM, PATH: INVASIVE MODERATELY DIFFERENTIATED ADENOCARCINOMA, DR. JENNIFER LI AT [facility]    DILATATION AND EVACUATION N/A 2009    ENDOSCOPY N/A 07/08/2019    LA GRADE A ESOPHAGITIS, GASTRITIS, ALL BIOPSIES BENIGN, DR. JENNIFER LI AT [facility]    ILEOSTOMY CLOSURE N/A 11/14/2022    Procedure: ILEOSTOMY TAKEDOWN;  Surgeon: Padmaja Esparza MD;  Location: [facility] OR;  Service: General;  Laterality: N/A;    INCISION AND DRAINAGE PERIRECTAL ABSCESS N/A 08/14/2020    Procedure: INCISION AND DRAINAGE OF retrorectal dehiscence abcess with drain placement and irrigation;  Surgeon: Padmaja Esparza MD;  Location: [facility] MAIN OR;  Service: General;  Laterality: N/A;    PAP SMEAR N/A 01/24/2014    SIGMOIDOSCOPY N/A 07/24/2019    INFILTRATIVE PARTIALLY OBSTRUCTING LARGE RECTAL CANCER, AREA TATOOED, DR. PADMAJA ESPARZA AT [facility]    SIGMOIDOSCOPY N/A 11/23/2019    AN ULCERATED PARTIALLY OBSTRUCTING MASS IN MID RECTUM, AREA TATTOOED, DR. PADMAJA ESPARZA AT [facility]    SIGMOIDOSCOPY N/A 07/22/2021    PATENT ANASTAMOSIS IN DISTAL RECTUM, RESCOPE IN 1 YR, DR. PADMAJA ESPARZA AT [facility]    SIGMOIDOSCOPY N/A 09/11/2024    PATCHY INFLAMMATION AND EDEMA IN DESCENDING, AREA BIOPSIED AND WAS BENIGN, PATENT  AND HEALTHY ANASTAMOSIS, HEMORRHOIDS,RESCOPE(CY) 12/2025, DR. PADMAJA ESPARZA AT [facility]    TRANSRECTAL ULTRASOUND N/A 07/24/2019    Procedure: ULTRASOUND TRANSRECTAL;  Surgeon: Padmaja Esparza MD;  Location: [facility] ENDOSCOPY;  Service: General    URETEROSCOPY LASER LITHOTRIPSY WITH STENT INSERTION Right 10/30/2019    DR. ESTUARDO BEASLEY AT [facility]    VAGINAL DELIVERY N/A 09/18/1998    VENOUS ACCESS DEVICE (PORT) INSERTION Left 08/09/2019    Procedure: INSERTION VENOUS ACCESS DEVICE;  Surgeon: Sj Joseph MD;  Location: [facility] OR [facility];  Service: General    VENOUS ACCESS DEVICE (PORT) INSERTION N/A 12/18/2020    Procedure: INSERTION VENOUS ACCESS DEVICE right side, removal venous access device left side;  Surgeon: Sj Joseph MD;  Location: [facility] OR [facility];  Service: General;  Laterality: N/A;    WISDOM TOOTH EXTRACTION Bilateral 1993       HEMATOLOGIC/ONCOLOGIC HISTORY:      Rectal cancer, rectal ultrasound examination in July 2019 reported T3N0 disease without lymphadenopathy. She does have small pulmonary nodule 6-7 mm and 2 mm with indeterminate feature. There is no solid evidence of metastatic disease otherwise. Patient has stage IIA (T3N0M0) disease.  The patient is heterozygous factor V Leiden, was on prophylactic anticoagulation with Lovenox 40 mg daily given her increased risk of thrombosis with MediPort and GI malignancy.   PET scan on 8/8/2019 reported an 8 mm hypermetabolic right upper lobe pulmonary nodule, which is suspicious for metastatic as well.    Patient had a PowerPort placement on the left upper chest by Dr. Joseph on 8/9/2019.  Patient was started on concurrent infusional 5-FU chemoradiation therapy on 8/12/2019, with planned complete surgical resection and further adjuvant chemotherapy with intention to cure the disease.   Patient underwent surgical resection of the primary rectal cancer by Dr. Esparza on 12/2/2019.  Pathology evaluation reported residual T3N1 disease stage IIIb.  Diarrhea  related to therapy and radiation.   Patient was started cycle 1 day 1 of adjuvant FOLFOX 6 on 1/23/2020.  On 2/5/2020 FOLFOX 6 cycle 2 delayed secondary to neutropenia.  Patient was given 3 days of Granix injection.  After cycle #2 we planned 3 days of Granix with each cycle.   Patient also intolerant of oral iron.  Patient received 2 doses of IV injectafer on 02/05/2020 and 02/12/2020.   02/12/2020 Proceed with cycle #2 of FOLFOX 6 with 3 days of Granix.  On 3/11/2020 cycle 4 postponed secondary to abdominal pain and occasional low-grade fevers.  CT scans ordered.  Cycle #4 resumed after CT scan revealed no evidence of disease.  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 Ye. patient has no fever.  Continue to observe.   Grade 3 hand-foot syndrome secondary to 5-FU after cycle #7 FOLFOX 6 chemotherapy, prompting ER visit.  Also has worsening peripheral neuropathy.   Cycle #8 FOLFOX 6 was given on 5/13/2020, with 50% dose reduction for both 5-FU and oxaliplatin, and also examination of bolus 5-FU.   PET scan examination on 5/21/2020 reported no evidence of metastatic disease in the chest abdomen pelvis.  Stable 6 mm RUL pulmonary nodule.  On 5/27/2020, I discussed with the patient that we can discontinue chemotherapy at this time.   Patient had a surgical procedure for low anterior colon resection, coloanal anastomosis on 8/3/2020.  CT scan for chest abdomen pelvis on 9/9/2020 reported a new 8 mm noncalcified pulmonary nodule in the left lower lobe of the lung.  Otherwise stable right upper lobe 6 mm pulmonary nodule, and no evidence of disease recurrence in the abdomen or pelvis.  PET scan examination on 9/18/2020 reported multiple hypermetabolic small pulmonary nodules/ new pulmonary nodules.   Patient was started on pelvic chemotherapy with FOLFIRI regimen on 10/13/2020.   Further genetic study [facility] reported positive for K-saskia  mutation.  But wild-type NRAS. It reported tumor mutation burden 5 Muts/Mb, microsatellite stable, TP53 mutation R282W, and others.   Cycle #5 was without irinotecan, due to peripheral neuropathy.  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  Patient was switched to weight-based Lovenox injection.  Cycle #6 5-FU and irinotecan was delayed by 2 weeks because of the above incident.  Patient had a telemedicine evaluation at that the [facility] [place] in February 2021.  They agreed with our treatment plan for palliative chemotherapy followed by maintenance chemotherapy.    PET scan examination on 4/6/2021 after cycle #12 palliative chemotherapy reported no evidence of hypermetabolic metastatic lesion.   Patient was started on maintenance chemotherapy with 5-FU and Avastin on 4/13/2021. (Unable to tolerate Xeloda because of a significant hand-foot syndrome).   PET scan on 9/24/2021 obtained after cycle #12 maintenance chemotherapy with 5-FU, leucovorin, Avastin reported no evidence for active disease. We recommended 3 months chemotherapy holiday.  CT scan examination on 3/15/2022 reported slightly disease progression with increase in size of small pulmonary nodule.  We started patient back on maintenance chemotherapy on 3/29/2022 treatment with 5-FU plus leucovorin and Avastin, repeating every 2 weeks.  After 6 more maintenance chemotherapy, CT scan for chest abdomen pelvis was done on 6/21/2022 which reported stable sub-6 mm pulmonary nodules.  Patient had last maintenance chemotherapy on 6/7/2022.   Disease progression, patient was restarted back on chemotherapy with 5-FU leucovorin and bevacizumab 8/21/2024    The patient reports she has intermittent rectal bleeding and urgency, mucous with her stool, starting sometime in 2016. At that time she was referred to GI service, and was diagnosed of irritable bowel syndrome.  Nevertheless she had worsening urgency for bowel movement, and had a couple of incidents losing control of stool. She was recently seen by Roland Thorpe MD again and had colonoscopy and EGD exam on 07/08/2019. She was found having a circumferential rectal mass. According to the procedure note, the patient had a fungating circumferential bleeding 4 cm mass in the middle rectum, at distance between 13 cm and 17 cm from the anus. Mass was causing partial obstruction. There were also colon polyps found at the hepatic flexure and also at the rectosigmoid junction 23 cm from the anus. Both were resected and retrieved. EGD examination reported grade A esophagitis at the GE junction and patchy discontinuous erythema and aggravation of the mucosa without bleeding in the stomach body. There is normal mucosa of the duodenum. Pathology evaluation from this procedure reported moderately differentiated adenocarcinoma involving the rectal mass. The rectal sigmoid junction polyp was tubular/tubulovillous adenoma with high grade dysplasia negative for invasive malignancy. The hepatic flexure polyp was also tubular/tubulovillous adenoma negative for high grade dysplasia or malignancy. The biopsy from the esophagus reports squamocolumnar mucosa with inflammatory changes suggestive of mild reflux esophagitis, negative for interstitial metaplasia. Gastric biopsy was benign and duodenal biopsy was also benign. There is no mention of H-pylori.     Patient was subsequently referred to colorectal surgeon Padmaja Ye MD for further evaluation. The patient had CT scan examination for chest, abdomen and pelvis requested by Dr. Ye and were done on 07/13/2019. The study reported no evidence of lymphadenopathy in the abdomen and pelvis. There was wall thickening of the rectosigmoid junction. Normal spleen, pancreas, adrenal glands and kidneys. There was fatty liver infiltration but no focal lymphatic lesions. There was a small 6-7 mm  noncalcified nodule in the right upper lobe and a tiny 3 mm subpleural nodule in the right middle lobe. No mediastinal or hilar lymphadenopathy.     Dr. Ye performed staging rectal ultrasound examination. This study reported tumor penetrating through the muscularis propria as T3 disease; there were no lymph nodes identified.    She had a hospitalization in late September 2019 because of newly discovered pulmonary emboli.  She was on prophylactic Lovenox prior to the incident of PE.  Now she is on full dose Xarelto anticoagulation.  Patient reports no further chest pain dyspnea.  She denies bleeding or bruising.  During her hospitalization, she was seen by Dr. Ye, who plans to have surgery 8 to 12 weeks after finishing radiation therapy.  She finished her radiation on 9/19/2019.     I noticed patient also presented to the emergency room on 10/1/2019 complaining of right flank area pain.  I reviewed the images studies and indeed she has a very small 1 to 2 mm obstructing kidney stone in the UV junction.  Patient is still symptomatic with some pains and dysuria.  She denies fever sweating or chills.    Laboratory study on 10/7/2019 reported normal CBC including hemoglobin 13.1, platelets 301,000, WBC 6170 and ANC 4900 lymphocytes 590 monocytes 480.      The nurse reported malfunction of port-a-catheter on 10/7/2019.  Port study on 10/8/2019 showed fibrin sheath around the distal aspect of the Mediport catheter in the SVC. This does not appear to hinder injection, but does prevent aspiration at this time.    Patient underwent surgical resection of the colon on 12/2/2019 with Dr. Ye.  Pathology evaluation reported residual T3 disease, also 1 out of 14 lymph nodes positive for malignancy.  So this patient in either had at least stage IIIb disease (T3 N1 M0?).      Adjuvant chemotherapy FOLFOX 6 will be started on 1/22/2020.  Laboratory study reported iron saturation 10%, free iron 31 TIBC 319 and ferritin 168.   Her hemoglobin was 11.8, WBC 5600, and platelets 347,000.    Patient was here on 02/12/2020 for cycle 2 of her FOLFOX.  This is delayed x1 week secondary to neutropenia.  The patient ultimately received 3 days worth of Neupogen with recovery of her blood counts.  Of note, the patient struggled with significant nausea without any episodes of vomiting following cycle #1 of chemotherapy resulting in significant weight loss.  She is up 12 pounds over the last week as her appetite has normalized.  We will add Emend to her care plan.    She is having several loose stools per her colostomy and has been hesitant to take Imodium due to prior history of constipation.  I encouraged her to try this with a maximum of 8 tablets/day.  She denies any infectious symptoms including fevers or chills.  No excess bleeding or bruising noted.  She had the expected cold sensitivity related to the Oxaliplatin for about 3 days following treatment.    Labs from 02/12/2020 demonstrated total white blood cell count of 5.14, ANC of 3.06, hemoglobin of 11.2, platelets of 211,000.  She was found to be iron deficient last week and is intolerant to oral iron secondary to GI distress.  For this reason, she initiated IV iron therapy with Injectafer last week.  She had received her second dose 02/12/2020    Patient has normalized hemoglobin 12.2 on 2/26/2020.    She reported on 5/5/2020 she had a recent visit to the emergency department for what was suspected to be an allergic reaction.  She called our on-call service on Saturday with reports of hand and face swelling.  She was instructed to proceed to the emergency department at which time she was assessed and prescribed a Medrol Dosepak.  She had just completed her 5-FU and was unhooked on Friday, 5/3/2020.  Her symptoms have improved.  She does report persistent hyperpigmentation and mild swelling of the palms of the hands but this is much improved.  It was her right hand specifically that was  swollen.  Her facial swelling has resolved.  She continues on Cefdinir nd since has 1 day remaining, she was prescribed cefdinir for a UTI requiring hospitalization at the end of April.  Reports no new symptoms.  Her labs are stable.  She is scheduled for treatment again.    Patient states at this time she is not tolerating her chemotherapy well.     Patient seen in the emergency department on 5/2/2020 for what was suspected to be an allergic reaction.  She called our on-call service on Saturday explaining that she was experiencing hand and face swelling.  She was instructed to proceed to the emergency department at which time she was assessed and prescribed a Medrol Dosepak.  She had just completed her 5-FU and was unhooked on Friday, 5/1/2020.      She reports since her ED visit on 5/2/2020 her symptoms have not improved. Her hands and feet were swollen upon presenting to the ED and she could barely make a fist. She states that she feels so swollen she is not able to stand it. She states that her skin on the hands and feet are peeling extensively as well, besides changing color to more dark.     She also reports significant fatigue after her first week of the 5-FU treatment but she expected this side effect. She also notices significant increase in her neuropathy to the point that she is not able to even walk around in her home without her house slippers due to irritation from her rugs. She denies and associated nausea or vomiting at this time. She also has episodes of epistaxis every day after the chemotherapy cycle #7.  She does report working full-time during the week of chemotherapy.    Laboratory studies, 5/13/2020, show moderate thrombocytopenia platelets 123,000, low normal WBC 4140 including ANC 2720 and normal hemoglobin 13.4.  Because significant hand-foot syndrome, will decrease both 5-FU and oxaliplatin by 50%, and eliminate bolus dose of 5-FU.    On 5/21/2020 patient had a PET scan performed which  showed no convincing evidence of residual disease in abdomen or pelvis, no metastatic disease within the chest or neck.     Cycle #8 FOLFOX 6 was given on 5/13/2020, with 50% dose reduction for both 5-FU and oxaliplatin, and also examination of bolus 5-FU.  She states on 5/27/2020 that with the recent reduction of the chemotherapy she feels significantly better. She has more energy and is able to do her daily routine.      PET scan examination on 5/21/2020 reported no evidence of metastatic disease in chest abdomen pelvis.  There was a stable 6 mm right upper lobe pulmonary nodule.    Laboratory studies on 5/27/2020 showed mild leukocytopenia WBC 3720 but a normal ANC 2250 and lymphocytes 630.  Normal platelets 163,000 and hemoglobin 12.6.  Chemistry lab reported normal renal function, liver function panel and a electrolytes, elevated glucose 164.    Laboratory studies 6/24/2020, showed normal hemoglobin 13.4 but macrocytosis .9.  Normal platelets 210,000 and WBC 5870.  She had normal CMP.     Patient last time was here in late June 2020.  Since that time she had surgical procedure for low anterior colon resection, coloanal anastomosis on 8/3/2020.  She later developed a perirectal abscess, required surgical drainage on 8/14/2020.  She was discharged on 8/27/2020.    Patient reports to me she still has lower abdominal wall vacuum suction in place.  She denies fever sweating chills.  Performance status is ECOG 1.  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  She does have visiting nurse come to the home for wound care.    CT scan for chest abdomen pelvis on 9/9/2020 reported a new 8 mm noncalcified pulmonary nodule in the left lower lobe.  Otherwise stable right upper lobe 6 mm pulmonary nodule, and no evidence of disease recurrence in the abdomen or pelvis.     Laboratory study on 9/16/2020 reported elevated AST 55, ALT 95, alk phosphatase 143 but normal total bilirubin 0.3.   Chemistry lab otherwise unremarkable.  She has completed normal CBC.      Because of the abnormal CT scan examination for chest abdomen pelvis on 9/9/2020 reported a new 8 mm noncalcified pulmonary nodule in the left lower lobe, we obtained a PET scan examination for further evaluation, which was done on 9/18/2020.  This study reported several pulmonary nodules, some of them are hypermetabolic, newly developed compared to previous PET scan in May 2020.  Certainly does highly suspicious for metastatic disease.  There are also hypermetabolic activity in the abdomen and pelvic area which is hard to differentiate from surgical scar versus metastatic malignancy.    Laboratory study on 10/13/2020 reported normal CBC with Hb 13.9, platelets 302,000 and WBC 5520 including ANC 3830.  Chemistry lab reported normalized AST 20, alk phosphatase 116 improved ALT 35, and maintains normal bilirubin, with normal electrolytes and liver function panel.     Patient was started on first cycle of palliative chemotherapy FOLFIRI on 10/13/2020.    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  Patient had a new port placed 12/18/2020, and had held her Xarelto for 2 days prior to surgery.  She presented to the ER on 12/21/20 with complaints of facial and arm swelling for 3 days.  She also noted increased shortness of breath.  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  Thrombus within the right internal jugular vein cannot be excluded.  Patient was started on IV heparin, and eventually transitioned to weight-based Lovenox, which she now continues.    PET scan examination on 9/24/2021 reported further shrinking of the tiny right upper lobe pulmonary nodule.  Otherwise no new lesions.  No evidence for metastatic disease in other areas.      Patient had CT  scan for chest with IV contrast obtained on 12/14/2021 which reported small tiny stable pulmonary nodules. There is a 4 mm right upper lobe pulmonary nodule. There is also a stable 4 mm left lower lobe pulmonary nodule. There is also stable left upper lobe micronodule.     Laboratory studies today on 12/21/2021 reported normal hemoglobin 15.9 however .0, platelets 218,000 WBC 5360 including neutrophils 3730 lymphocytes 980. Chemistry lab reported normal renal function, electrolytes, glucose, and marginally elevated ALT 55, AST 34 but normal total bilirubin and alk phosphatase.     CT scan for chest abdomen pelvis 6/21/2022 reported sub-6 mm pulmonary nodules either stable or slightly smaller.  No new pulmonary nodules or evidence for disease progression.  There is no evidence for metastatic disease in the liver however it shows diffuse hepatic steatosis.  There was masslike thickening in the presacral space with the clips or calcification approximately 1.7 cm in greatest AP dimension but stable.    Laboratory study on 9/20/2022 reported normal hemoglobin 15.7 with mild macrocytosis .1.  She has normal CBC and platelets.  She also has unremarkable CMP.  Normal CEA 1.13 ng/mL.    Patient was seen by Dr. Ye, who performed ileostomy takedown on 11/14/2022.  Patient reports that she is recovering.  She still has a small open wound less than 1 cm in diameter, however close to 2 cm deep.  She has been changing dressing herself.  She denies fever sweating chills.    Patient has no other specific complaints.  She has excellent performance status ECOG 0.  She denies chest pain dyspnea cough hemoptysis.  No abdominal pain.  No melena hematochezia.  Patient has been eating well, stable weight.  She works full-time.    She continues Lovenox injections and denies any significant bleeding issues.   No other new problems or concerns.     CT scan for chest abdomen pelvis obtained on 1/10/2023 reported stable small  pulmonary nodules, no evidence for active or new disease.    Laboratory study on 1/10/2023 reported normal CBC and normal CMP.  CEA level is 0.99 ng/mL.    CT scan for chest, abdomen, and pelvis on 7/7/2023 reported stable small pulmonary nodules including a left upper lobe 5 mm nodule. There were no new masses, or pleural effusion. No enlarged supraclavicular, axillary, mediastinal, or hilar lymphadenopathy. Mediastinal vasculature normal. There is occlusion of the superior vena around the catheter with body wall  is present. There was no evidence for disease recurrence in the abdomen or pelvis. Bone is also negative for metastatic disease.    Laboratory studies obtained on 07/07/2023 also reported normal CBC, and normal CMP as well as low level CEA 1.07 ng/mL.    The CT scan for the chest on 10/27/2023 reported enlarging pulmonary nodules bilaterally. The right upper lobe lung nodule increased from 7 x 7 mm to 9 x 8 mm, and the left upper lobe nodule measured 8 x 9 mm from 5 x 6 mm in 04/2023. There is a different left upper lobe nodule 6 x 8 mm from previous 5 x 5 mm. The presacral tissue thickness measures up to 2.3 cm.    A laboratory study on 10/27/2023 reported CEA 1.58 ng/mL, normal CBC, and CMP.  Molecular study of peripheral blood Guardant Reveal is still pending.    The patient presents today on 11/17/2023 for reevaluation to discuss the results of PET scan examination as well as the results of tumor conference. I saw her recently on 11/03/2023 and because of enlarging pulmonary nodules on the CT scan, we requested a PET scan examination. I presented her case yesterday on 11/16/2023 at the [facility].    The patient reports she is at her baseline condition as 2 weeks ago. No specific complaints, no chest pain, dyspnea, cough, etc. She has a regular bowel movement.    Her case was present at the [facility]. on 11/16/2023. The PET scan images and chest CT  scan were reviewed in conjunction with her treatment history. The consensus was for patient to receive stereotactic radiation therapy for the right upper lobe lung lesion, and the bigger left upper lobe lesion, and monitor the smaller left upper lobe lesion with further images studies down the line. Also, the conference recommended Guardant Reveal study which we already ordered.     This Guardant Reveal study came back today as negative for ctDNA 0%.      CT of the chest on 2/2/2024 reported shrinking of the right upper lobe lesion from 9 mm to 6 mm, and the left upper lobe lesion also decreased from 9 mm to 6 mm. Otherwise, there are a couple of stable pulmonary nodules, 7 to 8 mm. The right hilar has a small lymph node that has increased from 6 mm to 8 mm and is otherwise stable. There was no suspicious lesion in the abdomen or pelvis.    Colonoscopy examination 9/11/2024 showed patchy mild inflammation characterized by congestion/edema in the descending colon. Evidence of previous endo coloanal anastomosis in the rectum. Presence of hemorrhoids.      MEDICATIONS    Current Outpatient Medications:     clonazePAM (KlonoPIN) 1 MG tablet, Take 1 tablet as needed daily for anxiety. May take one additional as needed for severe anxiety., Disp: 45 tablet, Rfl: 3    Cyanocobalamin (VITAMIN B-12 PO), Take 1 tablet by mouth 3 (Three) Times a Week. M-W-F, Disp: , Rfl:     dicyclomine (BENTYL) 20 MG tablet, TAKE 1 TABLET BY MOUTH EVERY 6 (SIX) HOURS AS NEEDED FOR IRRITABLE BOWEL SYMPTOMS, Disp: 360 tablet, Rfl: 2    diphenoxylate-atropine (LOMOTIL) 2.5-0.025 MG per tablet, TAKE 2 TABLETS BY MOUTH 4 TIMES A DAY, Disp: 240 tablet, Rfl: 5    doxycycline (MONODOX) 100 MG capsule, Take 1 capsule by mouth 2 (Two) Times a Day., Disp: 14 capsule, Rfl: 0    enoxaparin sodium (LOVENOX) 100 MG/ML solution prefilled syringe syringe, INJECT 1 ML UNDER THE SKIN INTO THE APPROPRIATE AREA AS DIRECTED EVERY 12 (TWELVE) HOURS., Disp: 60 mL,  Rfl: 2    escitalopram (LEXAPRO) 10 MG tablet, Take 1 tablet by mouth Daily., Disp: 90 tablet, Rfl: 1    famotidine (PEPCID) 20 MG tablet, TAKE 1 TABLET BY MOUTH TWICE A DAY, Disp: 180 tablet, Rfl: 2    HYDROcodone-acetaminophen (NORCO) 5-325 MG per tablet, Take 2 tablets by mouth Every 6 (Six) Hours As Needed for Moderate Pain., Disp: 90 tablet, Rfl: 0    hydrocortisone 2.5 % cream, APPLY RECTALLY 3 TIMES DAILY. INCLUDE APPLICATOR., Disp: , Rfl:     Hydrocortisone, Perianal, (ANUSOL-HC) 2.5 % rectal cream, Apply rectally 3 times daily.  Include applicator., Disp: 30 g, Rfl: 1    Loperamide HCl (IMODIUM PO), Take  by mouth As Needed., Disp: , Rfl:     Loratadine 10 MG capsule, Take 1 capsule by mouth Every Evening., Disp: , Rfl:     metoprolol succinate XL (TOPROL-XL) 25 MG 24 hr tablet, TAKE 0.5 TABLETS BY MOUTH EVERY NIGHT, Disp: 45 tablet, Rfl: 2    nystatin (MYCOSTATIN) 445830 UNIT/GM cream, Apply 1 Application topically to the appropriate area as directed 2 (Two) Times a Day., Disp: 30 g, Rfl: 2    nystatin-zinc oxide-hydrocortisone-bacitracin, Apply topically to the appropriate area as directed Daily As Needed., Disp: 60 g, Rfl: 3    octreotide (sandoSTATIN) 100 MCG/ML injection, INJECT 1 ML UNDER THE SKIN INTO THE APPROPRIATE AREA AS DIRECTED 3 TIMES A DAY, Disp: 90 mL, Rfl: 2    ondansetron (ZOFRAN) 8 MG tablet, TAKE 1 TABLET BY MOUTH THREE TIMES A DAY AS NEEDED FOR NAUSEA AND VOMITING, Disp: 60 tablet, Rfl: 1    pantoprazole (PROTONIX) 40 MG EC tablet, TAKE 1 TABLET BY MOUTH EVERY DAY, Disp: 90 tablet, Rfl: 1    rosuvastatin (CRESTOR) 5 MG tablet, Take 1 tablet by mouth Daily., Disp: 90 tablet, Rfl: 0    ciprofloxacin (CILOXAN) 0.3 % ophthalmic solution, Administer 1 drop into the left eye Every 4 (Four) Hours. (Patient not taking: Reported on 5/21/2025), Disp: 10 mL, Rfl: 0    nitrofurantoin, macrocrystal-monohydrate, (Macrobid) 100 MG capsule, Take 1 capsule by mouth 2 (Two) Times a Day. (Patient not  "taking: Reported on 5/21/2025), Disp: 8 capsule, Rfl: 0    potassium chloride (KLOR-CON M10) 10 MEQ CR tablet, Take 1 tablet by mouth Daily. (Patient not taking: Reported on 5/21/2025), Disp: 20 tablet, Rfl: 0      ALLERGIES:   No Known Allergies      SOCIAL HISTORY:       Social History     Tobacco Use    Smoking status: Every Day     Current packs/day: 1.00     Average packs/day: 1 pack/day for 25.0 years (25.0 ttl pk-yrs)     Types: Cigarettes     Passive exposure: Current    Smokeless tobacco: Never    Tobacco comments:     1 PPD/caffeine use    Vaping Use    Vaping status: Some Days    Substances: Nicotine    Devices: Disposable    Passive vaping exposure: Yes   Substance Use Topics    Alcohol use: Not Currently     Comment: RARELY    Drug use: Never         FAMILY HISTORY:   Mother has positive factor V Leiden mutation, although she did not have thrombosis, mother also has coronary disease with stenting, she also has occasional bruising.    Maternal grandmother had DVT, she had multiple surgical procedures.    Patient mother's half-brother had metastatic colon cancer at diagnosis in his 50s.    Maternal great aunt had breast cancer.             Vitals:    05/21/25 0829   BP: 129/85   Pulse: 98   Resp: 17   Temp: 97.5 °F (36.4 °C)   TempSrc: Oral   SpO2: 96%   Weight: 80.2 kg (176 lb 14.4 oz)   Height: 167.5 cm (65.95\")   PainSc: 0-No pain           5/21/2025     8:27 AM   Current Status   ECOG score 0     Physical Exam    GENERAL:  Well-developed, well-nourished in no acute distress.    SKIN:  Warm, dry without rashes, purpura or petechiae.  HEENT:  Normocephalic.   LYMPHATICS:  No cervical, supraclavicular or axillary adenopathy.  CHEST: Normal respiratory effort.  Lungs clear to auscultation. Good airflow.  CARDIAC:  Regular rate and rhythm. Normal S1,S2.  ABDOMEN:  Soft, no tender.  Bowel sounds normal.  EXTREMITIES:  No lower extremity edema.        RECENT LABS:  Results from last 7 days   Lab Units " 05/21/25  0809   WBC 10*3/mm3 5.13   NEUTROS ABS 10*3/mm3 3.43   HEMOGLOBIN g/dL 13.7   HEMATOCRIT % 42.6   PLATELETS 10*3/mm3 185     Lab Results   Component Value Date    GLUCOSE 135 (H) 05/21/2025    BUN 6 05/21/2025    CREATININE 0.58 05/21/2025     05/21/2025    K 3.9 05/21/2025     05/21/2025    CALCIUM 8.7 05/21/2025    PROTEINTOT 6.3 05/21/2025    ALBUMIN 3.8 05/21/2025    ALT 13 05/21/2025    AST 17 05/21/2025    ALKPHOS 100 05/21/2025    BILITOT 0.2 05/21/2025    GLOB 2.5 05/21/2025    AGRATIO 1.5 05/21/2025    BCR 10.3 05/21/2025    ANIONGAP 8.3 05/21/2025    EGFR 113.9 05/21/2025     Urinalysis without microscopic (no culture) - Urine, Clean Catch (05/21/2025 09:12)             IMAGING:  CT Chest With Contrast Diagnostic, CT Abdomen Pelvis With Contrast  CT CHEST, ABDOMEN, AND PELVIS WITH IV CONTRAST     HISTORY: 44-year-old female with metastatic rectal cancer. Lung  metastases. Evaluate for metastatic disease.     TECHNIQUE: Radiation dose reduction techniques were utilized, including  automated exposure control and exposure modulation based on body size.   3 mm images were obtained through the chest, abdomen, and pelvis after  the administration of IV contrast. Compared with PET/CT 11/12/2024 and  chest CTs 01/31/2025.     FINDINGS:  CHEST:  1. Stable pulmonary nodules. The largest is at the superior segment of  the left lower lobe measuring 6 mm, image 43. There is no interval  change in several sub-6 mm pulmonary nodules as seen at the right upper  lobe, image 35, left upper lobe, image 39, and right lower lobe, image  64. There are no new lung opacities.     2. There are no pleural or pericardial effusions. There is no  lymphadenopathy.  The right Mediport is within the diminutive distal SVC. Most of the SVC  and the left brachiocephalic vein are occluded, and there are prominent  mediastinal and chest wall collaterals. Stable appearance.     ABDOMEN/PELVIS:  1. There is no significant  change in the rind of presacral soft tissue  thickening. No interval change in the appearance of the neorectum. There  is no acute bowel abnormality.     There is a significantly smaller volume of air at the vaginal canal.  Please correlate clinically for a colovaginal fistula. There is no air  within the presacral thickened soft tissues. No free fluid or fluid  collection.     2. There is no acute abnormality at the liver, spleen, pancreas,  adrenals, or kidneys. Uterus and adnexa appear unremarkable. Stable many  anterior abdominal wall injection sites. No subcutaneous hematomas.        This report was finalized on 5/20/2025 11:12 AM by Dr. Caroline Solano M.D  on Workstation: [id number]       Assessment & Plan      ASSESSMENT:   1.  Metastatic rectal cancer.   Initial rectal ultrasound examination reported T3N0 disease without lymphadenopathy.   CT scan of chest, abdomen and pelvis reported no lymphadenopathy in the abdomen, pelvis or chest. She does have small pulmonary nodule 6-7 mm and 2 mm with indeterminate feature. There is no solid evidence of metastatic disease otherwise.   Based on the CT scan and rectal ultrasound imaging studies, this patient had stage IIA (T3N0M0) disease.   She had PET scan examination on 8/8/2019 which reported a hypermetabolic right upper lobe pulmonary nodule 6 mm with SUV 5.6.  This is suspicious for metastatic disease however it is too small to be biopsied.  This patient may have stage IV disease.   She initiated concurrent radiation with continuous 5-FU on 8/12/2019.  Patient finished concurrent chemoradiation therapy.  Patient underwent surgical resection of the rectal tumor and diverting loop ileostomy on 12/2/2019 with Dr. Ye.  Pathology evaluation reported residual T3 disease, with 1 out of 14 lymph nodes positive for malignancy.  Certainly this patient has at least stage IIIb rectal cancer (T3N1M0?)  On 1/22/2020 adjuvant chemotherapy FOLFOX 6 regimen initiated.   On  2/5/2022 cycle #2 was delayed secondary to neutropenia.  She was given 3 days of Granix.   Emend added with cycle 3.  With improved nausea control  Continuing home Granix x3 days following 5-FU disconnect  3/11/2020 due for cycle 4, however, she is experiencing progressive abdominal pain and occasional fevers.   CT scan performed on 3/13/2020 reveals no evidence of progressive or recurrent disease.  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  Patient hospitalized 4/24-4/26/20 after cycle 5 of chemotherapy with acute UTI.  CT abdomen/pelvis noting fluid collection in the presacral region having diminished in size compared to CT dated 3/13/2020.  Patient was evaluated by Dr. Ye while in the hospital with further plans to evaluate possible colovaginal fistula following completion of chemotherapy.  Patient did respond to IV antibiotics and discharged home on oral cefdinir.  4/29/2020 cycle 6 of FOLFOX.  Urinary symptoms have resolved   Patient seen in the [facility] ED on 5/2/2020 for what was suspected to be an allergic reaction.  She called our service on Saturday explaining that she was experiencing hand and face swelling.  She was instructed to proceed to the emergency department at which time she was assessed and prescribed a Medrol Dosepak.  She had just completed her 5-FU and was unhooked on Friday, 5/1/2020.  Her symptoms have resolved in the office on assessment, 5/5/2020.  The patient had grade 3 hand-foot syndrome based on symptomology.  Patient had cycle #8 of 5-FU on 5/13/2020. Due to her symptoms and poor tolerance to the 5-FU, her treatment dose will be reduced 50% for oxaliplatin and infusional 5-FU.  Bolus 5-FU will be discontinued..  On 5/21/2020 patient had a PET scan and it showed no evidence of of metastatic disease in the neck, chest, abdomen or pelvis.  There was fluid accumulation/abscess.   On 5/27/2020 discussed with the patient that we can discontinue chemotherapy at this time.  She will  follow-up with Dr. Ye to discuss a possibility to reverse the ileostomy.  We likely will obtain anther PET scan in 3 to 4 months for reassessment.    Patient was seen by Dr. Ye on 6/19/2019 for evaluation and to discuss possible take down of her ileostomy.  She is scheduled to have a gastrografin enema on 7/2/2020 to evaluate for a fistula, and then a colonoscopy on 7/15/2020, both done by Dr. Ye.  She states that based on the above imaging and procedure findings, she may have a more extensive surgery done or just have her ileostomy reversed, which would likely be done in August 2020.  This will all be coordinated under the care of Dr. Ye.     CT scan from 09/09/2020 and also compared to her previous PET scan examination from 05/21/2020 and also the original PET scan from 08/09/2019.  The original PET scan there is a small right upper lobe pulmonary nodule 6 mm with SUV 5.6. So in the 05/2020 PET scan the nodule is still there but activity seems much less and this most recent CT scan examination also documented the preexisting small pulmonary nodule however there is a new left lower lobe pulmonary nodule 8 mm in size and I shared with the patient today this nodule is suspicious for metastatic disease. Laboratory studies reported minimal CEA level 1.32 on 09/09/2020. Liver function panel was only marginally abnormal with the ALT 45, the remaining is normal. However laboratory study today showed worsening AST 55, ALT 95 and alkaline phosphatase 143 but is still normal, total bilirubin 0.3.   Recommended to have repeat PET scan examination for assessment because the left lower lobe pulmonary nodule is too small to be biopsied, and if the PET scan reports hypermetabolic lesion, I recommended to have stereotactic radiation therapy. Explained to patient that she is not a really good candidate to have thoracotomy for resection because she still has unhealed wound in the abdomen. Stereotactic radiation therapy  will likely be a more feasible choice.   PET scan examination obtained on 9/18/2020 showed metastatic disease.  It reported a few hypermetabolic pulmonary nodules, and some new small pulmonary nodules, all of them highly suspicious for metastatic disease.  She also has hypermetabolic activity in the abdomen and pelvic area which could be related to scar tissue from her recent surgery versus metastatic disease.  Nevertheless overall picture fits with metastatic cancer.  Discussed with the patient on 9/20/2020, we reviewed the PET scan images together, and I recommended to have systematic chemotherapy, I do not think stereotactic reading therapy to the hypermetabolic lesions in the lungs warranted at this time because even those will be treated with reading therapy, she will still need systematic chemotherapy.  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  I did discuss with the patient using anti-EGFR monoclonal antibody versus anti-VEGF monoclonal antibody.     Palliative chemotherapy cycle#1 FOLFIRI was started on 10/13/2020.  No bolus 5-FU given considering previous poor tolerance.    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  Microsatellite stable, mutation burden 5/Mb.   Discussed with the patient 10/27/2020, because of the K-s[place]ia mutation, she is not a candidate for anti-EGFR monoclonal antibody such as Erbitux or vectibix.  She could be a candidate for anti-VEGF monoclonal antibody, however because of active wound, she is not a candidate right now at this moment.  Patient seen in the ED for acute right neck pain on 11/16/2020.  CT angiogram as well as CT of the cervical spine performed with no acute findings but notably one of her pulmonary nodules, left upper lobe, somewhat decreased in size.  Patient given prednisone pack.  Further details below.  Cycle #6 chemotherapy will be resumed on 1/5/2021.  Started back on original irinotecan.  PET scan  examination obtained on 1/12/2021 after cycle #6 chemotherapy reported improved pulmonary nodule hypermetabolic activity.  Confirmed these are metastatic lesions.  Patient is evaluated 1/19/2020, will continue cycle #7 FOLFIRI chemotherapy.  However Avastin will be on hold see next section.   Patient was reevaluated on 2/2/2021, will continue chemotherapy cycle #8 FOLFIRI.  Avastin / biosimilar will be added.    She talked to [facility] specialist in mid February 2021, and had recommended palliative chemotherapy without surgical intervention of metastatic lung lesions.   On 3/2/2021, patient will proceed with cycle #10 FOLFIRI plus bevacizumab.  After 12 cycles, plan to start maintenance treatment.  3/16/2021 cycle 11.  Plus bevacizumab.  3/30/2021 cycle 12 FOLFIRI plus bevacizumab.  Having issues with worsening nausea despite premeds with dexamethasone, Aloxi, Emend, and Zofran at home.  Patient is requesting a dose of Phenergan, which is helped her in the past.  We will give this to her with treatment today.  PET scan examination on 4/6/2021 reported no evidence of hypermetabolic metastatic lesion.  Discussed with the patient on 4/13/2021, we reviewed the PET scan results.  We recommended to switch to maintenance chemotherapy without irinotecan.  We will continue 5-FU and Avastin every 2 weeks.  We discussed possibility of switching to oral Xeloda treatment.  Discussed with the patient for side effects more so with hand-foot syndrome.  Patient is agreeable.   Xeloda 2000 mg twice daily 7 days on, 7 days off along with Avastin initiated 4/27/2021.  After only 5 doses of Xeloda significant hand-foot syndrome, Xeloda held. Patient requesting to be transitioned back to infusional 5-FU, as she felt that she tolerated infusional 5-FU without any problem.  The patient will receive 5-FU leucovorin and Avastin every 2 weeks.  Xeloda discontinued.  5/25/2021 continued cycle #4 maintenance 5-FU,  leucovorin, Avastin tolerating this much better so far, we will continue this. Recommended to have 12 cycles of maintenance chemotherapy, then obtain PET scan for reevaluation.  We could discuss further treatment plan at that time.  9/14/2021 patient due for cycle 12 of additional maintenance 5-FU/leucovorin plus Avastin.  She continues to tolerate treatment well overall.  She is anxious in the office today with her Mediport not getting blood at first and also with upcoming scans being heavy on her mind.  She was instructed to take one of her Klonopin pills while here to help calm her mind.  Heart rate did improve from 140s down to 112.  Proceed with treatment today as scheduled.  PET scan examination on 9/24/2021 reported further shrinking of the right upper lobe tiny pulmonary nodule.  No other new lesions.  Discussed with patient on 9/24/2021 about further shrinking of the right upper lobe pulmonary nodule and no evidence for other new lesions. We recommended to have chemo break for 3 months with repeated CT scan for reevaluation.  Recent CT scan for chest 12/14/2021 and abdomen CT from 11/29/2021 reported no evidence for active disease. The right upper lobe pulmonary nodule, left lower lobe pulmonary nodule minimal about 4 mm and stable. I discussed with patient today on 12/21/2021, recommended to give her another 3 months chemotherapy holiday and obtain CT scan afterwards for reevaluation. After discussion, patient is agreeable.   CT scan examination for chest abdomen and pelvis obtained on 3/15/2022 reported a slight increase of the left upper lobe pulmonary nodule 5 mm, from previous 3 mm.  The other pulmonary nodules were stable in size.  There is also small left upper lobe groundglass changes.   Dr. Nguyen reviewed images studies with the patient 3/23/2022, compared to multiple previous images including CT chest CT and PET scan, suspected disease progression.  Discussed with the patient, and I recommended to  resume maintenance chemotherapy with 5-FU plus leucovorin plus Avastin repeating every 2 weeks, and obtaining CT scan for reassessment in 3 months.  Patient is agreeable.  Patient resumed maintenance chemotherapy on 3/29/2022 with 5-FU leucovorin and Avastin.   4/26/2022, proceed with cycle #27 maintenance 5-FU, leucovorin, and Avastin.  Patient continues to tolerate treatment well.    On 5/10/2022, we will proceed ahead with cycle #28 maintenance chemotherapy.  Plan to have CT scan after cycle #30.  5/24/2022 cycle 29 maintenance chemotherapy.  Continue to tolerate well.  6/7/2022 cycle #30 maintenance chemotherapy, continuing to tolerate well.   CT scan for chest abdomen pelvis with IV contrast obtained on 6/21/2022 reported sub-6 mm pulmonary nodules either stable or slightly smaller.  We reviewed the images with the patient together.  We discussed with the patient and recommended to hold chemotherapy for now with repeating CT scan in 3 months for reassessment.  CT scan of the chest abdomen pelvis 9/13/2022 reported stable condition, no evidence for recurrent or metastatic colon cancer.  On 1/10/2023 patient had a CT chest abdomen pelvis with reported no evidence for disease progression.  Laboratory study also showed normal CEA.   Patient had a CT scan for chest, abdomen, and pelvis obtained on 04/11/2023. This study reported very small pulmonary nodules, the right upper lobe nodule is stable to 6 mm, however, the left upper lobe and the left lower lobe nodule increased to 5 mm from previous 4 mm. I discussed with the patient today on 04/19/2023, I recommend to obtain another CT scan in 3 months for reassessment. The nodules are so small, they likely will not be picked up by PET scan examination.  CT scan examination of the chest, abdomen, and pelvis obtained on 7/7/2023 reported stable small pulmonary nodules. No evidence for disease progression or new lesions in chest, abdomen, and pelvis.  Discussed with  patient today on 7/14/2023, however, patient is concerning for disease progression. I recommended to obtain Guardant Reveal for circulating tumor DNA study. If the study is positive, we will further obtain PET scan examination, otherwise, we will obtain CT scan for chest, abdomen, and pelvis in 3 months for reassessment.  The patient had CT scan for the chest, abdomen, and pelvis obtained on 10/27/2023, which reported worsening pulmonary nodules at bilateral upper lobes. Laboratory studies showed low normal CEA level and normal liver function panel. The Guardant Reveal study is still pending. I discussed this with the patient on 11/03/2023 and we shared the images, and I recommended having a PET scan examination for further assessment. I will present her case at the multimodality lung conference. If those lesions are deemed to be metastatic lesions, I recommend having stereotactic radiotherapy. I discussed it with the patient, and she voiced understanding and was agreeable with that strategy.  I presented her case at the multimodality chest conference 11/16/2023.  The consensus was for patient to receive stereotactic radiation therapy for the right upper lobe lung lesion, and the bigger left upper lobe lesion, and monitor the smaller left upper lobe lesion with further images studies down the line. Also, the conference recommended Guardant Reveal study which we already ordered. I discussed with the patient today on 11/17/2023, we explained to the patient that the opinion of the conference and she is agreeable with this and prefers this strategy because of limited toxicity compared to long term commitment to starting chemotherapy again. I recommend to obtain a CT scan for the chest, abdomen, and pelvis with both IV and oral contrast for reassessment in 3 months for reassessment of metastatic lung lesions and thickness in the pelvis where the PET scan reported a low activity SUV 2.4 in the surgical bed.   The patient  had steroid-induced radiation therapy for the right upper lobe and one of the left upper lobe lesions.  Her CT scan examination on 02/02/2024 reported shrinking nodules of the right upper lobe and one of the left upper lobe lesions, both from 9 mm down to 6 mm. There are 2 stable pulmonary nodules 7 mm. Nothing new.  02/09/2024, I discussed with the patient and recommended CT scan for the chest, abdomen, and pelvis in 3 months for reassessment. Guardant Reveal study was 0% ctDNA. We will also continue laboratory studies every 3 months for Guardant Reveal, together with routine labs, CBC, CMP, and CEA.  CT scan of the chest, abdomen, and pelvis conducted on 4/23/2024 revealed stable multiple pulmonary nodules, with no other new pulmonary nodules or evidence of disease recurrence or abnormal pelvis. The patient's liver function panel was normal, and low-level CEA level was also noted. The Guardant Reveal study was able to be obtained earlier due to regulatory rules, and we hugo obtain today the Guardant reveal study, be available within 10 to 14 days.   Given the patient's metastatic liver lesion, and lung lesions from colon cancer, careful monitoring is necessary. A chest CT scan with IV contrast will be arranged in 3 months for reevaluation. The study will be reviewed again in 3 months, which will include CBC, CMP, and CEA level.   Imaging study with chest CT scan on 08/02/2024 reported multiple bilateral pulmonary nodules that are relatively stable. However, the Guardant Reveal reported positive ctDNA indicating disease progression. A subsequent PET scan examination on 08/14/2024 reported newly developed hypermetabolic pulmonary nodules. The highest SUV is 3.7, corresponding to an 8 mm new right upper lobe nodule, and there are several other hypometabolic nodules in the lungs.   8/21/2024 resumption of chemotherapy with 5-FU bevacizumab  Triage visit 8/28/2024 with intractable diarrhea that was unclear if this is  secondary to chemotherapy or infectious etiology given her known chronic colitis, fever and abdominal pain.  Further management as outlined below  9/4/2024 per review today diarrhea has improved though she is having some difficulty with passage of gas and stool,?  Related to significant inflammation in the rectum versus tumor obstruction.  The passage of gas has improved in the last few days but she does still have to change positions on the toilet to get stool to pass without it being painful.  Discussed all of this with Dr. Nguyen and we will refer the patient urgently back to Dr. Ye for at the very least rectal examination in the office versus full repeated colonoscopy.  Because of the potential for examination or invasive procedures, we will hold bevacizumab today.  Because the patient had significant diarrhea last week and concerns that it may be related to chemotherapy we will decrease her 5-FU/leucovorin today by 30%.  If she does well we can go back to full dose with cycle 3.     9/18/2024 she presented for evaluation prior to cycle #3 of palliative chemotherapy with 5-FU, leucovorin, and bevacizumab. Given her recent biopsy on 09/11/2024, it is prudent to postpone the Avastin treatment today to ensure safety.  Chemotherapy will be proceeded.      She presented for evaluation on 10/02/2024, tolerating chemotherapy except for diarrhea. This has been managed with octreotide. Proceed with cycle 4 chemotherapy today with 5-FU, leucovorin and resumption of bevacizumab.   Reevaluation 10/16/2024 due for cycle 5 5-FU, leucovorin, bevacizumab.  Due to ongoing significant diarrhea, bevacizumab will be placed on hold for potential surgical intervention.  Additionally, 5-FU will be dose reduced to 50%.  Additional adjustments as outlined below  Patient reviewed 10/30/2024 with improved tolerance to cycle five 5-FU, leucovorin.  We we will attempt to slightly escalate 5-FU dosing, increasing by 10% (40% dose  reduction)  Patient did experience leakage of 5-FU, and return 10/31/2024 for evaluation of this.  She was sent for port dye study that showed correct location of her port, so 5-FU was resumed.  The patient's right rib pain is likely due to a new chair on the bone, as indicated by the PET scan. The PET scan revealed a new 2.8 x 1.3 cm groundglass opacity in the lateral aspect of the left apex with SUV 6.3. This appearance is nonspecific and may represent an infectious or inflammatory infiltrate. Continued monitoring and follow-up imaging are recommended.  The current regimen of 5-FU will be maintained, and Avastin will be reintroduced with full dose. 11/13/2024 will proceed ahead with 5-FU, leucovorin and bevacizumab.   12/4/2024 She also reports two bad days of diarrhea, likely due to Avastin. The dosage of Avastin will be reduced to 4 mg/kg. The 5-FU dosage will be increased to 1680 mg/m².   12/18/2024 proceed with 5-FU and Avastin continuing previous dose reductions as she had excellent tolerance to treatment 2 weeks ago.   12/31/2024 patient reports tolerating chemotherapy with the same dose adjustment.  This is a 1 day early because of the closure of the New Year's day holiday tomorrow.  Next cycle will be resumed in 15 days back to her usual schedule.  Will plan to have CT scan for chest abdomen pelvis with IV contrast in early February 2024.  1/29/2025 proceed with 5-FU and bevacizumab maintaining previous dosing.  CT scan for chest abdomen pelvis on 1/31/2025 reported stable small pulmonary nodules.  No evidence for disease progression.  Due to profound diarrhea from chemotherapy, we decided to postpone next chemotherapy to 3/5/2025.    3/5/2025 patient has improved diarrhea. Chemotherapy will be resumed with reduction of 5-FU at 1440 mg/m² and bevacizumab at 3 mg/kg and the leucovorin at 240 mg/m².  She will continue octreotide 100 mcg every 8 hours for chemotherapy-induced diarrhea, along with Imodium and  Lomotil as needed.   3/19/2025 proceed with chemotherapy at previous dosing.  She tolerated most recent cycle of chemotherapy very well.    on 4/9/2025 patient presented for evaluation.  Laboratory studies reported normal CBC and unremarkable CMP.  Patient has been generally tolerating well except for some issue with her diarrhea.  We recommended to continue with the current dosage of Avastin and 5-FU with leucovorin. A scan is scheduled for mid-May 2025 for reassessment of her disease.  4/23/2025 hold chemotherapy due to profound diarrhea and weight loss following most recent cycle of treatment.   5/21/2025 patient reports that her condition remains stable.  CT scan of the chest reported the largest lung nodule is 6 mm.  No evidence of pulm metastatic disease in the abdomen or pelvis.  Discussed with the patient that her chemotherapy would be temporarily halted.       2.  Intractable diarrhea due to chemotherapy.   Patient developed diarrhea on Saturday, 8/24/2024.  Is unclear if this is related to infection as she did have fevers at the time the diarrhea began or chemotherapy.  She does have chronic colitis noted on most recent PET scan, this therefore could be diverticulitis flare  GI panel and C. difficile negative  8/29/2024 she did receive a single dose of octreotide  Begin empiric Flagyl 500 mg 3 times daily x 10 days  8/30/2024 discussed negative stool studies.  We will add Levaquin to already prescribed Flagyl to complete management of diverticulitis.  It is unclear if the patient's symptoms are related to diverticular flare given her previous abdominal pain and fever versus chemotherapy.  However, her symptoms are currently improved  9/4/2024 diarrhea has resolved.  Stools are now more soft with some form but she is having difficulty with passage of stool, having to change positions on the toilet to avoid pain.  We are referring back to Dr. Ye as detailed above.  She will finish out the Flagyl and  Levaquin as prescribed having roughly 3 days left of both.  We will also adjust doses of 5-FU/leucovorin today with concern for potential chemotherapy-induced diarrhea.  If she does well this cycle we can increase back to full dose with cycle 3 per Dr. Nguyen.  On 9/18/2024 patient reports that she experienced significant diarrhea during cycle #2 chemotherapy on 09/04/2024, leading to a 30% dose reduction. Despite taking Lomotil 2 tablets four times a day, Imodium, and Pepto-Bismol, her diarrhea persisted. She received octreotide shots twice, which improved her symptoms from watery to mushy stools but did not fully resolve the issue. She will continue with the current regimen and consider adding octreotide to her home regimen if diarrhea starts at home. The potential side effects of octreotide, including nausea, were discussed.   10/1/2024 she reports using octreotide self-injection at home, which has significantly improved her diarrhea. Most of the time, she only requires it once a day and occasionally twice a day, depending on the severity of her diarrhea. She is satisfied with the results, which have improved her quality of life and ability to eat properly.   She will also use Lomotil and the Imodium as needed.  Patient is very tearful in the office 10/16/2024 regarding debilitating diarrhea and interference with quality of life.  She questions potential surgical intervention and placement of colostomy due to ongoing pain in her rectum and burning of her skin from diarrhea.  We discussed adjustments today including increased dose of octreotide to 200 mcg 3 times daily for diarrhea.  Chemotherapy dose adjustments.  Diarrhea was slightly improved following most recent cycle of chemotherapy.  Continue supportive care  12/4/2024 patient reports that Avastin may be the agent causing her diarrhea.  So we will decrease dose by 20% and to see how things going.  Improved diarrhea with dose reduction to Avastin.  Continue  previous dosing.   Continue to be improved with continued octreotide 100 mg 3 times daily.  She also has Lomotil and Imodium to utilize for breakthrough symptoms.   4/9/2025 patient had some issues with diarrhea from chemotherapy.  It also caused irritation of her hemorrhoids.  Overall stable.  We recommended continue octreotide 3 times a day.   4/23/2025 unfortunately, the patient has had a recent flare of her diarrhea.  She is maximizing octreotide, Lomotil and Imodium.  She does note her bowels have slightly improved in the past few days.  Stool specimen today was negative for C. difficile.    5/21/2025 patient reports chronic diarrhea for the last month and a half, with no solid bowel movements and significant gas. Advised to continue using octreotide injection, Imodium and Lomotil as needed. Over-the-counter Metamucil is recommended to be used twice a day to help thicken stools.  A prescription for cholestyramine 4 g up to 3 times daily as needed has been provided, with instructions to increase the dosage to twice daily if no improvement is observed, and further to three times daily if necessary. A message will be sent to Dr. Ye regarding the need for a colonoscopy.    3.   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  Low-dose Lovenox discontinued and initiated Xarelto 20 mg daily.  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  Patient held Xarelto for 2 days prior to MediPort removal from the left chest wall and  placement of new port in the right chest wall on 12/18/2020.  She resumed Xarelto that evening.  Progressive swelling in the neck, face, upper extremities prompting hospitalization with CT scan showing thrombosis in the right subclavian vein and SVC.  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  Although she had been off of Xarelto for a few days, clearly Xarelto was not prohibiting progression of her thrombosis after she resumed treatment and there was some evidence to suggest thrombosis had been present at least in the innominate vein on prior scan in September but now appears chronic.  Current acute thrombosis involving SVC and right subclavian.  Patient was admitted and placed on heparin drip  Patient was evaluated by vascular surgery and intervention was not recommended for thrombolysis/thrombectomy.  On 12/22/2020, the patient developed worsening shortness of breath, increasing upper extremity edema consistent with worsening SVC syndrome.  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  On 2/2/2021, we discussed that side effects of Avastin/biosimilar related to thrombosis.  Since this patient already has been on Lovenox, I think the benefits from treatment for her cancer will outweigh that risk of thrombosis, will proceed ahead with Avastin.  I cautioned patient to watch out for signs of worsening thrombosis patient voiced understanding.   5/11/2021 Lovenox dosing will be adjusted due to patient's weight loss.  We discussed she needs 1 mg/kg.  Her syringes are 100 mg syringes she will do 0.9 mL subcu every 12 hours.  8/3/2021 Patient continues to have bruising on abdomen from lovenox injections.  Will need to monitor weight and adjust dosing in future if further weight loss.   Stable condition on 9/28/2021.  Continue  Lovenox anticoagulation.    Patient reports no chest pain no dyspnea no leg edema. However she had bruising and also most recently abnormal small abscess for which she actually went to ER on 11/29/2021, had I&D. The wound is healing. No further drainage. Denies fever sweating or chills. After discussion, she will continue Lovenox injection for now.   On 3/23/2022, patient reports good tolerance of Lovenox.  Nevertheless she still has small quantity of pus in the left lower abdomen abscess, she squeezes it every day to prevent it became worse.  She reports no fever sweating chills.  Recommended to start Augmentin 875 mg twice a day for 7 days.  She will continue Lovenox indefinitely.  On 4/12/2022, patient reports resolution of the small abscess at left lower abdominal wall.   Patient had CTA again on 11/2/2024 due to continued pleuritic pain that was negative for PE.  Continue Lovenox 1 mg/kg twice daily without signs or symptoms of bleeding, no signs or symptoms of thrombosis.      4.  Mild anemia.   She also has history of iron deficiency.  Iron studies reveal iron saturation of 10%.  She was started on oral iron but was unable to tolerate due to GI side effects.   Status post Injectafer 2/5/2020 and 2/12/2020   Improved hemoglobin 13.5 on 1/5/2021.  3/16/2020 when hemoglobin now up to 16.2, hematocrit 47.6.  Patient admits that she has started smoking again, and I have encouraged her to quit.  She denies any snoring or sleep apnea diagnosis.  Patient is not taking oral iron.  We will closely monitor.  3/30/2020 hemoglobin 15.7, HCT 47.5.  Patient states that she has cut back significantly on her smoking, although not quit yet.  I have encouraged her to continue working on this.  We will continue to closely monitor.  Laboratory studies 6/22/2021 reported excellent folate > 20 ng/mL, however low normal B12 level 357 pg/mL.    On 7/6/2021, normal hemoglobin 15.8, .9 and HCT 47.2%.  Patient was asked to start  oral vitamin B12 at 1000 mcg daily.   Lab study on 12/14/2021 reported excellent ferritin 296 and iron saturation 25% with free iron 104 TIBC 424. Hemoglobin was 15.2 with .2.   Normal hemoglobin 14.6 on 3/15/2022.  Iron study reported normal ferritin 129.5 ng/mL however slightly low iron saturation 11%.  Continue to monitor for now.  On 1/29/2025 hemoglobin is normal at 14.5.  5/21/2025 normal hemoglobin 13.7.    5.  Peripheral neuropathy secondary to chemotherapy.   This is mild involving bilateral hands and feet as reported on 9/16/2020.   Will avoid chemotherapy with oxaliplatin.  Stable mild neuropathy as of 11/10/2020  Patient reports worsening peripheral neuropathy and cycle #5 chemotherapy on 12/8/2020 with and without irinotecan.   On 1/5/2021, patient reports improvement of peripheral neuropathy, will add irinotecan back to chemotherapy.  Continue to monitor and adjust medication.  Stable.      6.  Depression.  Patient seen by JULIET Davenport on 11/9/2020.  She was started on mirtazapine.  Lexapro was discontinued.  This has definitely improved her mood with the patient feeling overall much better.  She is sleeping better.  Appetite is improved with her actually eating more than she wants to.    Condition is stable.  She plans to continue follow-up with supportive oncology.      7. Malfunction of the portal catheter  Patient had a PowerPort placement on the left upper chest by Dr. Joseph on 8/9/2019.  Patient reports there was no blood drawn from the portal catheter. CT scan of the chest on 7/7/2023 reported occlusion of the SVC around to the Port-A-Cath tubing. I recommend trying activase to see if it helps.  Otherwise, we may have to remove the catheter. Clinically, patient has no signs of SVC syndrome.  On 11/03/2023, the patient reports that her Port-A-Cath was able to be used for a CT scan for the injection of intravenous dye; however, there was no blood return, so we needed to draw from  her arm for the blood test. We will continue to keep Port-A-Cath for now.  On 02/09/2024, the patient reports that the port was able to be flushed. However, no blood was returned. We will continue to flush every 6 weeks.  Port dye study 10/31/2024 showing correct position of port.  Fibrin sheath present.  No issue today.      8.  This patient also has strong family history for malignancy   The patient had appointment with genetic counseling on September 3, 2019.  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    9. Hypertension.  Patient is /85 on 5/21/2025.  Continue management of hypertension and follow up with PCP.      10.  Chest and back pain  Ports this has been ongoing for about 4 weeks.  Started in her right chest/rib cage.  Originally this pain had completely resolved, however returned when she received her next chemotherapy infusion. She felt like the pain worsened after she was unhooked from her 5-FU and that the pain spread to her right scapular/back area.  Since unhook she has continued with pain in her right chest/back that has required her to take pain medicine regularly which is not typical for her.  She has pain with deep inspiration and pain with certain movements.  She also has pain when she pushes on the tissue around her port, though there is no redness or warmth around her port site aside from red rash in the shape of a bandaid and she reports being sensitive to bandaids.  Reviewed all of patients symptoms with Dr. Nguyen who recommended obtaining doppler right upper extremity and PET scan for further evaluation of this severe right sided pain that has been evaluated by CTA without any signs of what could be causing her pain.  Of note she was seen in the ED 11/2/2024, had CT chest performed which was negative for PE.  EKG and troponin looked okay.  She was discharged home.    Healing fracture of the right 2nd rib. The PET scan on 11/12/2024 showed new hypermetabolic activity at a healing  fracture on the anterior aspect of the right second rib with SUV 5.7. The patient should avoid activities that may exacerbate the pain and consider pain management options as needed.    12/4/2024 the patient reports improvement in rib pain, with the ability to lay on the affected side, though still experiencing some soreness and pain upon sneezing or deep breathing.    Today 4/9/2025 She reports her ribs fracture has subsided, with the last one healing about a week ago. She has had 7 rib fractures in total, with 1 reinjury. The fractures occurred from various incidents, including falling down the stairs and landing hard, twisting over to get something off her bedside table, or leaning down to  objects.  Carole Weaver reports a pain score of 0.  Given her pain assessment as noted, treatment options were discussed and the following options were decided upon as a follow-up plan to address the patient's pain: continuation of current treatment plan for pain.      *Hypokalemia related to diarrhea  On 4/23/2020 2022.  IV potassium replacement provided in the infusion area today.  Begin potassium 10 mEq once daily.  5/21/2025 normal potassium 3.9.  Continue oral potassium.    *Urinary tract infection  4/22/2025 patient reported urinary symptoms.  We initially prescribed Omnicef, however, the patient had a prescription for Macrobid at home and initiated this.  Tolerating Macrobid with improvement in her symptoms and without worsening diarrhea.  Therefore, we will cancel the Omnicef prescription and instead prescribe an additional 4 days of Macrobid to complete a 5-day course.  5/21/2025 at infection.        PLAN:    Pending Guardant Reveal results.   Hold chemotherapy for now due to significant diarrhea and loose bowel movement.  Refer patient back to her surgeon Dr. Ye for colonoscopy examination.  Start cholestyramine 4 g up to 3 times a day as needed for diarrhea.  She will continue octreotide 100 mcg every 8  hours for chemotherapy-induced diarrhea, along with Imodium and Lomotil as needed.    Continue potassium 10 mEq once daily  Continue Lovenox subcutaneously every 12 hours for anticoagulation.  Continue Magic barrier cream as needed.   Continue Norco 5/325 as needed for pain.  1 to 2 tablets.  The patient was encouraged to utilize this for her musculoskeletal pain following her fall  Continue Protonix 40 mg daily.   Continue Gas-X.   I will see patient in 5 weeks for reevaluation, to discuss laboratory results for CBC, CMP, and resume chemotherapy with 5-FU, leucovorin and Avastin    I spent 44 minutes caring for Carole on this date of service. This time includes time spent by me in the following activities: preparing for the visit, reviewing tests, obtaining and/or reviewing a separately obtained history, performing a medically appropriate examination and/or evaluation, counseling and educating the patient/family/caregiver, ordering medications, tests, or procedures, referring and communicating with other health care professionals, documenting information in the medical record, independently interpreting results and communicating that information with the patient/family/caregiver and care coordination         Dong Nguyen MD PhD  05/21/2025        CC:  Deepika Vela III NP-C   MD Alverto Bowers MD

## 2025-05-21 NOTE — TELEPHONE ENCOUNTER
Message was left for patient to call Raya at the office.  I was calling to schedule a colonoscopy.  Patient was given my office number to call.

## 2025-05-22 ENCOUNTER — PREP FOR SURGERY (OUTPATIENT)
Dept: OTHER | Facility: HOSPITAL | Age: 45
End: 2025-05-22
Payer: COMMERCIAL

## 2025-05-22 ENCOUNTER — TELEPHONE (OUTPATIENT)
Dept: SURGERY | Facility: CLINIC | Age: 45
End: 2025-05-22
Payer: COMMERCIAL

## 2025-05-22 DIAGNOSIS — Z85.048 HISTORY OF RECTAL CANCER: Primary | ICD-10-CM

## 2025-05-22 NOTE — TELEPHONE ENCOUNTER
Message was left on patients voicemail to call Raya at the office.  I was calling to schedule a colonoscopy.  Patient was given my office number to call.

## 2025-06-02 PROBLEM — R19.7 INTRACTABLE DIARRHEA: Status: ACTIVE | Noted: 2025-06-02

## 2025-06-02 LAB — REF LAB TEST RESULTS: NORMAL

## 2025-06-04 ENCOUNTER — HOSPITAL ENCOUNTER (OUTPATIENT)
Facility: HOSPITAL | Age: 45
Setting detail: HOSPITAL OUTPATIENT SURGERY
Discharge: HOME | End: 2025-06-04
Attending: COLON & RECTAL SURGERY | Admitting: COLON & RECTAL SURGERY
Payer: COMMERCIAL

## 2025-06-04 ENCOUNTER — ANESTHESIA EVENT (OUTPATIENT)
Dept: GASTROENTEROLOGY | Facility: HOSPITAL | Age: 45
End: 2025-06-04
Payer: COMMERCIAL

## 2025-06-04 ENCOUNTER — TELEPHONE (OUTPATIENT)
Dept: ONCOLOGY | Facility: CLINIC | Age: 45
End: 2025-06-04
Payer: COMMERCIAL

## 2025-06-04 ENCOUNTER — ANESTHESIA (OUTPATIENT)
Dept: GASTROENTEROLOGY | Facility: HOSPITAL | Age: 45
End: 2025-06-04
Payer: COMMERCIAL

## 2025-06-04 VITALS
SYSTOLIC BLOOD PRESSURE: 119 MMHG | OXYGEN SATURATION: 97 % | RESPIRATION RATE: 16 BRPM | HEART RATE: 80 BPM | DIASTOLIC BLOOD PRESSURE: 77 MMHG

## 2025-06-04 PROCEDURE — 45378 DIAGNOSTIC COLONOSCOPY: CPT | Performed by: COLON & RECTAL SURGERY

## 2025-06-04 PROCEDURE — 25010000002 LIDOCAINE 2% SOLUTION: Performed by: NURSE ANESTHETIST, CERTIFIED REGISTERED

## 2025-06-04 PROCEDURE — 25010000002 PROPOFOL 10 MG/ML EMULSION: Performed by: NURSE ANESTHETIST, CERTIFIED REGISTERED

## 2025-06-04 PROCEDURE — 25810000003 LACTATED RINGERS PER 1000 ML: Performed by: COLON & RECTAL SURGERY

## 2025-06-04 RX ORDER — FENTANYL CITRATE 50 UG/ML
25 INJECTION, SOLUTION INTRAMUSCULAR; INTRAVENOUS
Status: DISCONTINUED | OUTPATIENT
Start: 2025-06-04 | End: 2025-06-04 | Stop reason: HOSPADM

## 2025-06-04 RX ORDER — HYDRALAZINE HYDROCHLORIDE 20 MG/ML
10 INJECTION INTRAMUSCULAR; INTRAVENOUS
Status: DISCONTINUED | OUTPATIENT
Start: 2025-06-04 | End: 2025-06-04 | Stop reason: HOSPADM

## 2025-06-04 RX ORDER — LABETALOL HYDROCHLORIDE 5 MG/ML
10 INJECTION, SOLUTION INTRAVENOUS
Status: DISCONTINUED | OUTPATIENT
Start: 2025-06-04 | End: 2025-06-04 | Stop reason: HOSPADM

## 2025-06-04 RX ORDER — EPHEDRINE SULFATE 50 MG/ML
10 INJECTION, SOLUTION INTRAVENOUS ONCE AS NEEDED
Status: DISCONTINUED | OUTPATIENT
Start: 2025-06-04 | End: 2025-06-04 | Stop reason: HOSPADM

## 2025-06-04 RX ORDER — SODIUM CHLORIDE, SODIUM LACTATE, POTASSIUM CHLORIDE, CALCIUM CHLORIDE 600; 310; 30; 20 MG/100ML; MG/100ML; MG/100ML; MG/100ML
30 INJECTION, SOLUTION INTRAVENOUS CONTINUOUS PRN
Status: DISCONTINUED | OUTPATIENT
Start: 2025-06-04 | End: 2025-06-04 | Stop reason: HOSPADM

## 2025-06-04 RX ORDER — DROPERIDOL 2.5 MG/ML
0.62 INJECTION, SOLUTION INTRAMUSCULAR; INTRAVENOUS ONCE AS NEEDED
Status: DISCONTINUED | OUTPATIENT
Start: 2025-06-04 | End: 2025-06-04 | Stop reason: HOSPADM

## 2025-06-04 RX ORDER — DIPHENHYDRAMINE HYDROCHLORIDE 50 MG/ML
12.5 INJECTION, SOLUTION INTRAMUSCULAR; INTRAVENOUS
Status: DISCONTINUED | OUTPATIENT
Start: 2025-06-04 | End: 2025-06-04 | Stop reason: HOSPADM

## 2025-06-04 RX ORDER — FENTANYL CITRATE 50 UG/ML
50 INJECTION, SOLUTION INTRAMUSCULAR; INTRAVENOUS
Status: DISCONTINUED | OUTPATIENT
Start: 2025-06-04 | End: 2025-06-04 | Stop reason: HOSPADM

## 2025-06-04 RX ORDER — PROPOFOL 10 MG/ML
VIAL (ML) INTRAVENOUS AS NEEDED
Status: DISCONTINUED | OUTPATIENT
Start: 2025-06-04 | End: 2025-06-04 | Stop reason: SURG

## 2025-06-04 RX ORDER — FLUMAZENIL 0.1 MG/ML
0.2 INJECTION INTRAVENOUS AS NEEDED
Status: DISCONTINUED | OUTPATIENT
Start: 2025-06-04 | End: 2025-06-04 | Stop reason: HOSPADM

## 2025-06-04 RX ORDER — ONDANSETRON 2 MG/ML
4 INJECTION INTRAMUSCULAR; INTRAVENOUS ONCE AS NEEDED
Status: DISCONTINUED | OUTPATIENT
Start: 2025-06-04 | End: 2025-06-04 | Stop reason: HOSPADM

## 2025-06-04 RX ORDER — LIDOCAINE HYDROCHLORIDE 20 MG/ML
INJECTION, SOLUTION INFILTRATION; PERINEURAL AS NEEDED
Status: DISCONTINUED | OUTPATIENT
Start: 2025-06-04 | End: 2025-06-04 | Stop reason: SURG

## 2025-06-04 RX ORDER — NALOXONE HCL 0.4 MG/ML
0.2 VIAL (ML) INJECTION AS NEEDED
Status: DISCONTINUED | OUTPATIENT
Start: 2025-06-04 | End: 2025-06-04 | Stop reason: HOSPADM

## 2025-06-04 RX ADMIN — PROPOFOL 200 MCG/KG/MIN: 10 INJECTION, EMULSION INTRAVENOUS at 11:37

## 2025-06-04 RX ADMIN — SODIUM CHLORIDE, POTASSIUM CHLORIDE, SODIUM LACTATE AND CALCIUM CHLORIDE 30 ML/HR: 600; 310; 30; 20 INJECTION, SOLUTION INTRAVENOUS at 11:32

## 2025-06-04 RX ADMIN — PROPOFOL 80 MG: 10 INJECTION, EMULSION INTRAVENOUS at 11:37

## 2025-06-04 RX ADMIN — LIDOCAINE HYDROCHLORIDE 80 MG: 20 INJECTION, SOLUTION INFILTRATION; PERINEURAL at 11:37

## 2025-06-04 NOTE — ANESTHESIA PREPROCEDURE EVALUATION
Anesthesia Evaluation     no history of anesthetic complications:   NPO Solid Status: > 8 hours  NPO Liquid Status: > 2 hours           Airway   Mallampati: II  Neck ROM: full  no difficulty expected  Dental - normal exam     Pulmonary - normal exam   (+) pulmonary embolism, a smoker Current, lung cancer,  (-) COPD, asthma, sleep apnea    PE comment: nonlabored  Cardiovascular - normal exam    Rhythm: regular  Rate: normal    (+) hypertension, valvular problems/murmurs murmur, CAD, dysrhythmias Tachycardia, hyperlipidemia  (-) past MI, angina      Neuro/Psych  (+) psychiatric history Depression and Anxiety  (-) seizures, TIA, CVA  GI/Hepatic/Renal/Endo    (+) GERD, GI bleeding , renal disease- stones, diabetes mellitus (resolved post-partum) gestational  (-) liver disease, no thyroid disorder    Musculoskeletal (-) negative ROS    Abdominal    Substance History      OB/GYN          Other   blood dyscrasia (Heterozygous factor V Leiden mutation) anemia thrombocytopenia,   history of cancer (rectal cancer; secondary lung cancer)                  Anesthesia Plan    ASA 3     MAC       Anesthetic plan, risks, benefits, and alternatives have been provided, discussed and informed consent has been obtained with: patient.    CODE STATUS:

## 2025-06-04 NOTE — H&P
Carole Weaver is a 44 y.o. female  who is referred by Geronimo Ye MD for a colonoscopy. She   has an indications: previous colon cancer.     She denies any change in bowel function, melena, or hematochezia.    Past Medical History:   Diagnosis Date    Abdominopelvic abscess 08/12/2020    ADMITTED TO [facility]    Abnormal Pap smear of cervix 02/02/1998    JULIUS I    Abscess of abdominal wall 11/28/2012    SEEN AT [facility] ER    Anemia in pregnancy     Anxiety     Bilateral epiphora 04/2020    Bilateral hand swelling 05/02/2020    SEEN AT [facility] ER    Cervical lymphadenitis 06/06/2012    SEEN AT [facility] ER    Chemotherapy induced diarrhea 01/2021    Chemotherapy induced neutropenia 04/2020    Chemotherapy-induced nausea 02/2020    Chemotherapy-induced thrombocytopenia 05/2020    Chronic anticoagulation     Chronic diarrhea     Colon polyps     FOLLOWED BY DR. 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 ALEXANDRU HUNT    History of gestational diabetes     History of pre-eclampsia 1998    History of pre-eclampsia     History of radiation therapy 2019    FOLLOWED BY DR. JAVON LEWIS    History of TB skin testing 01/17/2009    TB Skin Test    History of TB skin testing 01/17/2009    TB Skin Test    History of transfusion 2019    12/2019    HPV in female 1998    Hypokalemia 09/2019    Hypomagnesemia 09/2019    Hyponatremia 06/2021    IBS (irritable bowel syndrome)      Ileostomy present 2020    Take down on 11/3/2022    Infected insect bite of neck 2016    SEEN AT [facility]    Kidney stone on right side 10/07/2019    Kidney stones 2007    SEEN AT [facility] ER    Low anterior resection syndrome 2022    FOLLOWED BY DR. PADMAJA ESPARZA    Lump of right breast     SWOLLEN LYMPH NODE    Lung cancer 2020    METASTATIC LUNG CANCER    Macrocytosis 2021    Monopolar depression     On anticoagulant therapy     Pain associated with surgical procedure 2020    Palmar-plantar erythrodysesthesia 2021    Perirectal abscess 2020    Perirectal abscess 2020    Added automatically from request for surgery [id number]      Pulmonary embolism without acute cor pulmonale 09/20/2019    X 3; ADMITTED TO [facility]    Pulmonary nodule, right 2020    Rectal cancer 2019    STAGE IIA, INVASIVE MODERATELY DIFFERENTIATED ADENOCARCINOMA, CHEMO AND XRT FINISHED 2019    Right shoulder pain 2020    SEEN AT [facility] ER    Right ureteral stone 10/01/2019    SEEN AT [facility] ER    Right ureteral stone 10/01/2019    SEEN AT [facility] ER      SAB (spontaneous ) 1996     A2-1 INDUCED    Sciatica     Sepsis due to cellulitis 2002    LEFT GREAT TOE, ADMITTED TO [facility]    Tachycardia 2020    Thrombosis of superior vena cava 2020    AND BRACHIOCEPHALIC VEIN, ADMITTED TO [facility]    Urinary urgency 2020       Past Surgical History:   Procedure Laterality Date    ABDOMINAL SURGERY      CHOLECYSTECTOMY N/A 10/10/2003    LAPAROSCOPIC WITH CHOLANGIOGRAM, DR. JAMEY TALAVERA AT [facility]    COLON RESECTION N/A 2019    Procedure: laparoscopic low anterior colon resection with mobilization of splenic flexure and diverting loop ileostomy: ERAS;  Surgeon: Padmaja Esparza MD;  Location: [facility] OR;  Service: General    COLON RESECTION N/A 2020    Procedure: LOW ANTERIOR COLON RESECTION, COLOANAL ANASTOMOSIS, MOBILIZATION SPLENIC FLEXURE;  Surgeon: Padmaja Esparza  MD SHILPA;  Location: [facility] MAIN OR;  Service: General;  Laterality: N/A;    COLONOSCOPY N/A 07/15/2020    PATENT ANASTAMOSIS IN MID RECTUM, RESCOPE IN 1 YR, DR. PADMAJA ESPARZA AT [facility]    COLONOSCOPY N/A 11/03/2022    DIFFUSE AREA OF MODERATELY FRIABLE MUCOSA IN ENTIRE COLON , DIVERSION COLITIS, PATENT AND HEALTHY ANASTAMOSIS, DR. PADMAJA ESPARZA AT [facility]    COLONOSCOPY N/A 12/04/2023    6 MM SESSILE SERRATED ADENOMA POLYP IN DESCENDING, PATENT ANASTAMOSIS IN DISTAL RECTUM, RESCOPE IN 2 YRS, DR. PADMAJA ESPARZA AT [facility]    COLONOSCOPY W/ POLYPECTOMY N/A 07/08/2019    15 MM TUBULOVILLOUS ADENOMA POLYP IN HEPATIC FLEXURE, 20 MMTUBULOVILLOUS ADENOMA WITH HIGH GRADE DYSPLASIA IN RECTOSIGMOID, 4 CM MASS IN MID RECTUM, PATH: INVASIVE MODERATELY DIFFERENTIATED ADENOCARCINOMA, DR. JENNIFER LI AT [facility]    DILATATION AND EVACUATION N/A 2009    ENDOSCOPY N/A 07/08/2019    LA GRADE A ESOPHAGITIS, GASTRITIS, ALL BIOPSIES BENIGN, DR. JENNIFER LI AT [facility]    ILEOSTOMY CLOSURE N/A 11/14/2022    Procedure: ILEOSTOMY TAKEDOWN;  Surgeon: Padmaja Esparza MD;  Location: [facility] OR;  Service: General;  Laterality: N/A;    INCISION AND DRAINAGE PERIRECTAL ABSCESS N/A 08/14/2020    Procedure: INCISION AND DRAINAGE OF retrorectal dehiscence abcess with drain placement and irrigation;  Surgeon: Padmaja Esparza MD;  Location: [facility];  Service: General;  Laterality: N/A;    PAP SMEAR N/A 01/24/2014    SIGMOIDOSCOPY N/A 07/24/2019    INFILTRATIVE PARTIALLY OBSTRUCTING LARGE RECTAL CANCER, AREA TATOOED, DR. PADMAJA ESPARZA AT [facility]    SIGMOIDOSCOPY N/A 11/23/2019    AN ULCERATED PARTIALLY OBSTRUCTING MASS IN MID RECTUM, AREA TATTOOED, DR. PADMAJA ESPARZA AT [facility]    SIGMOIDOSCOPY N/A 07/22/2021    PATENT ANASTAMOSIS IN DISTAL RECTUM, RESCOPE IN 1 YR, DR. PADMAJA ESPARZA AT [facility]    SIGMOIDOSCOPY N/A 09/11/2024    PATCHY INFLAMMATION AND EDEMA IN DESCENDING, AREA BIOPSIED AND WAS BENIGN, PATENT AND HEALTHY ANASTAMOSIS,  HEMORRHOIDS,RESCOPE(CY) 12/2025, [facility]    TRANSRECTAL ULTRASOUND N/A 07/24/2019    Procedure: ULTRASOUND TRANSRECTAL;  Surgeon: Geronimo Esparza MD;  Location: [facility] ENDOSCOPY;  Service: General    URETEROSCOPY LASER LITHOTRIPSY WITH STENT INSERTION Right 10/30/2019    DR. ESTUARDO BEASLEY AT [place]    VAGINAL DELIVERY N/A 09/18/1998    VENOUS ACCESS DEVICE (PORT) INSERTION Left 08/09/2019    Procedure: INSERTION VENOUS ACCESS DEVICE;  Surgeon: Sj Joseph MD;  Location:  [facility];  Service: General    VENOUS ACCESS DEVICE (PORT) INSERTION N/A 12/18/2020    Procedure: INSERTION VENOUS ACCESS DEVICE right side, removal venous access device left side;  Surgeon: Sj Joseph MD;  [facility];  Service: General;  Laterality: N/A;    WISDOM TOOTH EXTRACTION Bilateral 1993       Medications Prior to Admission   Medication Sig Dispense Refill Last Dose/Taking    clonazePAM (KlonoPIN) 1 MG tablet Take 1 tablet as needed daily for anxiety. May take one additional as needed for severe anxiety. 45 tablet 3 Past Month    Cyanocobalamin (VITAMIN B-12 PO) Take 1 tablet by mouth 3 (Three) Times a Week. M-W-F   6/2/2025    dicyclomine (BENTYL) 20 MG tablet TAKE 1 TABLET BY MOUTH EVERY 6 (SIX) HOURS AS NEEDED FOR IRRITABLE BOWEL SYMPTOMS 360 tablet 2 6/3/2025    doxycycline (MONODOX) 100 MG capsule Take 1 capsule by mouth 2 (Two) Times a Day. 14 capsule 0 6/3/2025    enoxaparin sodium (LOVENOX) 100 MG/ML solution prefilled syringe syringe INJECT 1 ML UNDER THE SKIN INTO THE APPROPRIATE AREA AS DIRECTED EVERY 12 (TWELVE) HOURS. 60 mL 2 6/3/2025 Morning    escitalopram (LEXAPRO) 10 MG tablet Take 1 tablet by mouth Daily. 90 tablet 1 6/3/2025    famotidine (PEPCID) 20 MG tablet TAKE 1 TABLET BY MOUTH TWICE A  tablet 2 6/3/2025    HYDROcodone-acetaminophen (NORCO) 5-325 MG per tablet Take 2 tablets by mouth Every 6 (Six) Hours As Needed for Moderate Pain. 90 tablet 0 6/4/2025 at  9:00  AM    Hydrocortisone, Perianal, (ANUSOL-HC) 2.5 % rectal cream Apply rectally 3 times daily.  Include applicator. 30 g 1 6/4/2025    Loratadine 10 MG capsule Take 1 capsule by mouth Every Evening.   6/3/2025    metoprolol succinate XL (TOPROL-XL) 25 MG 24 hr tablet TAKE 0.5 TABLETS BY MOUTH EVERY NIGHT 45 tablet 2 6/3/2025 Evening    ondansetron (ZOFRAN) 8 MG tablet TAKE 1 TABLET BY MOUTH THREE TIMES A DAY AS NEEDED FOR NAUSEA AND VOMITING 60 tablet 1 6/3/2025    pantoprazole (PROTONIX) 40 MG EC tablet TAKE 1 TABLET BY MOUTH EVERY DAY 90 tablet 1 6/3/2025    rosuvastatin (CRESTOR) 5 MG tablet Take 1 tablet by mouth Daily. 90 tablet 0 6/3/2025 Evening    cholestyramine (QUESTRAN) 4 g packet Take 1 packet by mouth 3 (Three) Times a Day With Meals. 1 packet  TID PRN diarrhea 90 packet 2     diphenoxylate-atropine (LOMOTIL) 2.5-0.025 MG per tablet TAKE 2 TABLETS BY MOUTH 4 TIMES A  tablet 5 6/2/2025    hydrocortisone 2.5 % cream APPLY RECTALLY 3 TIMES DAILY. INCLUDE APPLICATOR.       Loperamide HCl (IMODIUM PO) Take  by mouth As Needed.   6/2/2025    nystatin (MYCOSTATIN) 210564 UNIT/GM cream Apply 1 Application topically to the appropriate area as directed 2 (Two) Times a Day. 30 g 2     nystatin-zinc oxide-hydrocortisone-bacitracin Apply topically to the appropriate area as directed Daily As Needed. 60 g 3     octreotide (sandoSTATIN) 100 MCG/ML injection INJECT 1 ML UNDER THE SKIN INTO THE APPROPRIATE AREA AS DIRECTED 3 TIMES A DAY 90 mL 2 6/2/2025       No Known Allergies    Family History   Problem Relation Age of Onset    Hyperlipidemia Mother     Colon polyps Mother     Heart disease Mother     Hypertension Mother     Factor V Leiden deficiency Mother     Skin cancer Father         Squamous in 60s    Atrial fibrillation Father     Drug abuse Sister     Mental illness Sister         Addiction    No Known Problems Son     Colon cancer Maternal Uncle     Cancer Paternal Uncle     Colon cancer Paternal Uncle      Diabetes Paternal Uncle     Heart disease Paternal Grandfather     Cancer Paternal Aunt         OVARIAN    Malig Hyperthermia Neg Hx        Social History     Socioeconomic History    Marital status:      Spouse name: Justus    Number of children: 1    Years of education: College   Tobacco Use    Smoking status: Every Day     Current packs/day: 1.00     Average packs/day: 1 pack/day for 25.0 years (25.0 ttl pk-yrs)     Types: Cigarettes     Passive exposure: Current    Smokeless tobacco: Never    Tobacco comments:     1 PPD/caffeine use    Vaping Use    Vaping status: Former    Substances: Nicotine    Devices: Disposable    Passive vaping exposure: Yes   Substance and Sexual Activity    Alcohol use: Yes     Comment: RARELY    Drug use: Never    Sexual activity: Yes     Partners: Male     Comment: .       ROS    Vitals:    06/04/25 1127   BP: 120/79   Pulse: 83   Resp: 17   SpO2: 94%         Physical Exam  Constitutional:       Appearance: She is well-developed.   HENT:      Head: Normocephalic and atraumatic.   Eyes:      Pupils: Pupils are equal, round, and reactive to light.   Cardiovascular:      Rate and Rhythm: Regular rhythm.   Pulmonary:      Effort: Pulmonary effort is normal.   Abdominal:      General: There is no distension.      Palpations: Abdomen is soft.   Musculoskeletal:         General: Normal range of motion.   Skin:     General: Skin is warm and dry.   Neurological:      Mental Status: She is alert and oriented to person, place, and time.   Psychiatric:         Thought Content: Thought content normal.         Judgment: Judgment normal.           Assessment & Plan      indications: previous rectal cancer         I recommend colonoscopy.  I described risks, benefits of the procedure with the patient including but not limited to bleeding, infection, possibility of perforation and possible polypectomy. All of the patient's questions were answered and they would like to proceed with  the above recommendations.

## 2025-06-04 NOTE — ANESTHESIA POSTPROCEDURE EVALUATION
Patient: Carole Weaver    Procedure Summary       Date: 06/04/25 Room / Location:  [facility]    Anesthesia Start: 1134 Anesthesia Stop: 1156    Procedure: COLONOSCOPY TO CECUM Diagnosis:       History of rectal cancer      (History of rectal cancer [Z85.048])    Surgeons: Padmaja Ye MD Provider: Ronnell Schaeffer MD    Anesthesia Type: MAC ASA Status: 3            Anesthesia Type: MAC    Vitals  Vitals Value Taken Time   /77 06/04/25 12:16   Temp     Pulse 68 06/04/25 12:26   Resp 16 06/04/25 11:55   SpO2 93 % 06/04/25 12:26   Vitals shown include unfiled device data.        Post Anesthesia Care and Evaluation    Patient location during evaluation: bedside  Level of consciousness: awake  Pain management: adequate    Airway patency: patent  Anesthetic complications: No anesthetic complications    Cardiovascular status: acceptable  Respiratory status: acceptable  Hydration status: acceptable    Comments: *BP 92/58 (BP Location: Left arm, Patient Position: Lying)   Pulse 85   Resp 16   SpO2 95%

## 2025-06-04 NOTE — DISCHARGE INSTRUCTIONS
For the next 24 hours patient needs to be with a responsible adult.    For 24 hours DO NOT drive, operate machinery, appliances, drink alcohol, make important decisions or sign legal documents.    Start with a light or bland diet if you are feeling sick to your stomach otherwise advance to regular diet as tolerated.    Follow recommendations on procedure report if provided by your doctor.    Call Dr Ephraim Giang for problems     Problems may include but not limited to: large amounts of bleeding, trouble breathing, repeated vomiting, severe unrelieved pain, fever or chills.

## 2025-06-04 NOTE — TELEPHONE ENCOUNTER
Caller: [facility] - [place], [place] - [address] - [phone] [facility] [phone] FX    Relationship: Pharmacy    Best call back number: [phone]    Requested Prescriptions: 27 GAUGE NEEDLE      Pharmacy where request should be sent: [facility] - [place], [place] - [address] [facility] - [phone]  - [phone] FX     Last office visit with prescribing clinician: 5/21/2025   Last telemedicine visit with prescribing clinician: 2/12/2025   Next office visit with prescribing clinician: 6/25/2025     Additional details provided by patient: [facility] CALLED REQ SCRIPT BE SENT.

## 2025-06-05 NOTE — TELEPHONE ENCOUNTER
Returned call and explained patient stated she would call when she needle a refill on 27 gauge needles but at this time does not need a refill.

## 2025-06-06 ENCOUNTER — TELEPHONE (OUTPATIENT)
Dept: ONCOLOGY | Facility: CLINIC | Age: 45
End: 2025-06-06
Payer: COMMERCIAL

## 2025-06-06 RX ORDER — NEEDLES, SAFETY 22GX1 1/2"
1 NEEDLE, DISPOSABLE MISCELLANEOUS 3 TIMES DAILY PRN
Qty: 100 EACH | Refills: 1 | Status: SHIPPED | OUTPATIENT
Start: 2025-06-06

## 2025-06-06 NOTE — TELEPHONE ENCOUNTER
Caller:RYNE FROM  [facility] [place], [place] - [address] - [phone]  - [phone] FX    Relationship: Pharmacy    Best call back number: [phone]    Requested Prescriptions:   NEEDLES ONLY FOR THE octreotide (sandoSTATIN) 100 MCG/ML injection  SIZE IS 27 GAUGE BY 1/2 INCH       Pharmacy where request should be sent: [place], [place] - [address] - [phone]  - [phone] FX     Last office visit with prescribing clinician: 5/21/2025   Last telemedicine visit with prescribing clinician: 2/12/2025   Next office visit with prescribing clinician: 6/25/2025     Does the patient have less than a 3 day supply:  [x] Yes  [] No

## 2025-06-09 DIAGNOSIS — G89.3 CANCER ASSOCIATED PAIN: ICD-10-CM

## 2025-06-09 RX ORDER — HYDROCODONE BITARTRATE AND ACETAMINOPHEN 5; 325 MG/1; MG/1
2 TABLET ORAL EVERY 6 HOURS PRN
Qty: 90 TABLET | Refills: 0 | Status: CANCELLED | OUTPATIENT
Start: 2025-06-09

## 2025-06-10 DIAGNOSIS — G89.3 CANCER ASSOCIATED PAIN: ICD-10-CM

## 2025-06-10 RX ORDER — HYDROCODONE BITARTRATE AND ACETAMINOPHEN 5; 325 MG/1; MG/1
2 TABLET ORAL EVERY 6 HOURS PRN
Qty: 90 TABLET | Refills: 0 | Status: SHIPPED | OUTPATIENT
Start: 2025-06-10

## 2025-06-11 ENCOUNTER — TELEPHONE (OUTPATIENT)
Dept: ONCOLOGY | Facility: CLINIC | Age: 45
End: 2025-06-11
Payer: COMMERCIAL

## 2025-06-11 NOTE — TELEPHONE ENCOUNTER
Pharmacy Name:  [facility]         Pharmacy representative name: [facility]     Pharmacy representative phone number: [phone]    FAX # [phone]    What medication are you calling in regards to: SYRINGES SENT IN     What question does the pharmacy have:     NEEDING TO GET APPROVAL FOR LENGTH     DOES  NOT HAVE THE 5/8 INCH , HAS THE SAFETY GLIDE IN 1/2 IN LENGTH     Who is the provider that prescribed the medication: DR HUNT

## 2025-06-11 NOTE — TELEPHONE ENCOUNTER
Returned call to [facility] okay to substitute 1/2 inch needle for a 5/8 inch.

## 2025-06-16 RX ORDER — HYDROCORTISONE 25 MG/G
CREAM TOPICAL
Qty: 30 G | Refills: 1 | Status: SHIPPED | OUTPATIENT
Start: 2025-06-16

## 2025-06-25 ENCOUNTER — HOSPITAL ENCOUNTER (OUTPATIENT)
Dept: CARDIOLOGY | Facility: HOSPITAL | Age: 45
Discharge: HOME | End: 2025-06-25
Payer: COMMERCIAL

## 2025-06-25 ENCOUNTER — CLINICAL SUPPORT (OUTPATIENT)
Dept: OTHER | Facility: HOSPITAL | Age: 45
End: 2025-06-25
Payer: COMMERCIAL

## 2025-06-25 ENCOUNTER — APPOINTMENT (OUTPATIENT)
Dept: ONCOLOGY | Facility: HOSPITAL | Age: 45
End: 2025-06-25
Payer: COMMERCIAL

## 2025-06-25 ENCOUNTER — LAB (OUTPATIENT)
Dept: LAB | Facility: HOSPITAL | Age: 45
End: 2025-06-25
Payer: COMMERCIAL

## 2025-06-25 ENCOUNTER — INFUSION (OUTPATIENT)
Dept: ONCOLOGY | Facility: HOSPITAL | Age: 45
End: 2025-06-25
Payer: COMMERCIAL

## 2025-06-25 ENCOUNTER — OFFICE VISIT (OUTPATIENT)
Dept: ONCOLOGY | Facility: CLINIC | Age: 45
End: 2025-06-25
Payer: COMMERCIAL

## 2025-06-25 VITALS
HEART RATE: 74 BPM | DIASTOLIC BLOOD PRESSURE: 69 MMHG | WEIGHT: 176.4 LBS | HEIGHT: 66 IN | TEMPERATURE: 97.6 F | RESPIRATION RATE: 16 BRPM | SYSTOLIC BLOOD PRESSURE: 110 MMHG | BODY MASS INDEX: 28.35 KG/M2 | OXYGEN SATURATION: 98 %

## 2025-06-25 DIAGNOSIS — C78.00 MALIGNANT NEOPLASM METASTATIC TO LUNG, UNSPECIFIED LATERALITY: ICD-10-CM

## 2025-06-25 DIAGNOSIS — R19.7 INTRACTABLE DIARRHEA: ICD-10-CM

## 2025-06-25 DIAGNOSIS — M79.604 PAIN AND SWELLING OF LOWER EXTREMITY, RIGHT: ICD-10-CM

## 2025-06-25 DIAGNOSIS — Z79.899 ENCOUNTER FOR LONG-TERM (CURRENT) USE OF HIGH-RISK MEDICATION: ICD-10-CM

## 2025-06-25 DIAGNOSIS — E87.6 HYPOKALEMIA: ICD-10-CM

## 2025-06-25 DIAGNOSIS — Z79.69 ON ANTINEOPLASTIC CHEMOTHERAPY: ICD-10-CM

## 2025-06-25 DIAGNOSIS — C20 ADENOCARCINOMA OF RECTUM: Primary | ICD-10-CM

## 2025-06-25 DIAGNOSIS — M79.89 PAIN AND SWELLING OF LOWER EXTREMITY, RIGHT: ICD-10-CM

## 2025-06-25 DIAGNOSIS — C20 ADENOCARCINOMA OF RECTUM: ICD-10-CM

## 2025-06-25 DIAGNOSIS — Z79.899 ENCOUNTER FOR LONG-TERM (CURRENT) USE OF HIGH-RISK MEDICATION: Primary | ICD-10-CM

## 2025-06-25 DIAGNOSIS — I26.99 PULMONARY EMBOLISM WITHOUT ACUTE COR PULMONALE, UNSPECIFIED CHRONICITY, UNSPECIFIED PULMONARY EMBOLISM TYPE: ICD-10-CM

## 2025-06-25 DIAGNOSIS — E61.1 IRON DEFICIENCY: ICD-10-CM

## 2025-06-25 DIAGNOSIS — Z79.01 ON ANTICOAGULANT THERAPY: ICD-10-CM

## 2025-06-25 LAB
ALBUMIN SERPL-MCNC: 3.9 G/DL (ref 3.5–5.2)
ALBUMIN/GLOB SERPL: 1.6 G/DL
ALP SERPL-CCNC: 95 U/L (ref 39–117)
ALT SERPL W P-5'-P-CCNC: 16 U/L (ref 1–33)
ANION GAP SERPL CALCULATED.3IONS-SCNC: 10.4 MMOL/L (ref 5–15)
AST SERPL-CCNC: 18 U/L (ref 1–32)
BASOPHILS # BLD AUTO: 0.02 10*3/MM3 (ref 0–0.2)
BASOPHILS NFR BLD AUTO: 0.5 % (ref 0–1.5)
BH CV UPPER VENOUS LEFT INTERNAL JUGULAR AUGMENT: NORMAL
BH CV UPPER VENOUS LEFT INTERNAL JUGULAR COMPRESS: NORMAL
BH CV UPPER VENOUS LEFT INTERNAL JUGULAR PHASIC: NORMAL
BH CV UPPER VENOUS LEFT INTERNAL JUGULAR SPONT: NORMAL
BH CV UPPER VENOUS LEFT SUBCLAVIAN AUGMENT: NORMAL
BH CV UPPER VENOUS LEFT SUBCLAVIAN COMPRESS: NORMAL
BH CV UPPER VENOUS LEFT SUBCLAVIAN PHASIC: NORMAL
BH CV UPPER VENOUS LEFT SUBCLAVIAN SPONT: NORMAL
BH CV UPPER VENOUS RIGHT AXILLARY AUGMENT: NORMAL
BH CV UPPER VENOUS RIGHT AXILLARY COMPRESS: NORMAL
BH CV UPPER VENOUS RIGHT AXILLARY PHASIC: NORMAL
BH CV UPPER VENOUS RIGHT AXILLARY SPONT: NORMAL
BH CV UPPER VENOUS RIGHT BASILIC FOREARM COMPRESS: NORMAL
BH CV UPPER VENOUS RIGHT BASILIC UPPER COMPRESS: NORMAL
BH CV UPPER VENOUS RIGHT BRACHIAL COMPRESS: NORMAL
BH CV UPPER VENOUS RIGHT CEPHALIC FOREARM COMPRESS: NORMAL
BH CV UPPER VENOUS RIGHT CEPHALIC UPPER COMPRESS: NORMAL
BH CV UPPER VENOUS RIGHT INTERNAL JUGULAR AUGMENT: NORMAL
BH CV UPPER VENOUS RIGHT INTERNAL JUGULAR COMPRESS: NORMAL
BH CV UPPER VENOUS RIGHT INTERNAL JUGULAR PHASIC: NORMAL
BH CV UPPER VENOUS RIGHT INTERNAL JUGULAR SPONT: NORMAL
BH CV UPPER VENOUS RIGHT RADIAL COMPRESS: NORMAL
BH CV UPPER VENOUS RIGHT SUBCLAVIAN AUGMENT: NORMAL
BH CV UPPER VENOUS RIGHT SUBCLAVIAN COMPRESS: NORMAL
BH CV UPPER VENOUS RIGHT SUBCLAVIAN PHASIC: NORMAL
BH CV UPPER VENOUS RIGHT SUBCLAVIAN SPONT: NORMAL
BH CV UPPER VENOUS RIGHT ULNAR COMPRESS: NORMAL
BILIRUB SERPL-MCNC: 0.2 MG/DL (ref 0–1.2)
BILIRUB UR QL STRIP: NEGATIVE
BUN SERPL-MCNC: 7.3 MG/DL (ref 6–20)
BUN/CREAT SERPL: 12.8 (ref 7–25)
CALCIUM SPEC-SCNC: 8.9 MG/DL (ref 8.6–10.5)
CHLORIDE SERPL-SCNC: 105 MMOL/L (ref 98–107)
CLARITY UR: CLEAR
CO2 SERPL-SCNC: 23.6 MMOL/L (ref 22–29)
COLOR UR: YELLOW
CREAT SERPL-MCNC: 0.57 MG/DL (ref 0.57–1)
DEPRECATED RDW RBC AUTO: 48.6 FL (ref 37–54)
EGFRCR SERPLBLD CKD-EPI 2021: 114.4 ML/MIN/1.73
EOSINOPHIL # BLD AUTO: 0.15 10*3/MM3 (ref 0–0.4)
EOSINOPHIL NFR BLD AUTO: 3.4 % (ref 0.3–6.2)
ERYTHROCYTE [DISTWIDTH] IN BLOOD BY AUTOMATED COUNT: 13.5 % (ref 12.3–15.4)
GLOBULIN UR ELPH-MCNC: 2.4 GM/DL
GLUCOSE SERPL-MCNC: 101 MG/DL (ref 65–99)
GLUCOSE UR STRIP-MCNC: NEGATIVE MG/DL
HCT VFR BLD AUTO: 42.6 % (ref 34–46.6)
HGB BLD-MCNC: 13.7 G/DL (ref 12–15.9)
HGB UR QL STRIP.AUTO: ABNORMAL
IMM GRANULOCYTES # BLD AUTO: 0.01 10*3/MM3 (ref 0–0.05)
IMM GRANULOCYTES NFR BLD AUTO: 0.2 % (ref 0–0.5)
KETONES UR QL STRIP: NEGATIVE
LEUKOCYTE ESTERASE UR QL STRIP.AUTO: ABNORMAL
LYMPHOCYTES # BLD AUTO: 1.44 10*3/MM3 (ref 0.7–3.1)
LYMPHOCYTES NFR BLD AUTO: 32.6 % (ref 19.6–45.3)
MCH RBC QN AUTO: 31.3 PG (ref 26.6–33)
MCHC RBC AUTO-ENTMCNC: 32.2 G/DL (ref 31.5–35.7)
MCV RBC AUTO: 97.3 FL (ref 79–97)
MONOCYTES # BLD AUTO: 0.3 10*3/MM3 (ref 0.1–0.9)
MONOCYTES NFR BLD AUTO: 6.8 % (ref 5–12)
NEUTROPHILS NFR BLD AUTO: 2.5 10*3/MM3 (ref 1.7–7)
NEUTROPHILS NFR BLD AUTO: 56.5 % (ref 42.7–76)
NITRITE UR QL STRIP: NEGATIVE
NRBC BLD AUTO-RTO: 0 /100 WBC (ref 0–0.2)
PH UR STRIP.AUTO: 6 [PH] (ref 4.5–8)
PLATELET # BLD AUTO: 171 10*3/MM3 (ref 140–450)
PMV BLD AUTO: 9.4 FL (ref 6–12)
POTASSIUM SERPL-SCNC: 3.7 MMOL/L (ref 3.5–5.2)
PROT SERPL-MCNC: 6.3 G/DL (ref 6–8.5)
PROT UR QL STRIP: NEGATIVE
RBC # BLD AUTO: 4.38 10*6/MM3 (ref 3.77–5.28)
SODIUM SERPL-SCNC: 139 MMOL/L (ref 136–145)
SP GR UR STRIP: 1.02 (ref 1–1.03)
UROBILINOGEN UR QL STRIP: ABNORMAL
WBC NRBC COR # BLD AUTO: 4.42 10*3/MM3 (ref 3.4–10.8)

## 2025-06-25 PROCEDURE — 25010000002 FLUOROURACIL PER 500 MG: Performed by: INTERNAL MEDICINE

## 2025-06-25 PROCEDURE — 81003 URINALYSIS AUTO W/O SCOPE: CPT

## 2025-06-25 PROCEDURE — 80053 COMPREHEN METABOLIC PANEL: CPT

## 2025-06-25 PROCEDURE — 96413 CHEMO IV INFUSION 1 HR: CPT

## 2025-06-25 PROCEDURE — 93971 EXTREMITY STUDY: CPT

## 2025-06-25 PROCEDURE — 36593 DECLOT VASCULAR DEVICE: CPT

## 2025-06-25 PROCEDURE — 96367 TX/PROPH/DG ADDL SEQ IV INF: CPT

## 2025-06-25 PROCEDURE — 25010000002 DEXAMETHASONE SODIUM PHOSPHATE 100 MG/10ML SOLUTION: Performed by: INTERNAL MEDICINE

## 2025-06-25 PROCEDURE — G0498 CHEMO EXTEND IV INFUS W/PUMP: HCPCS

## 2025-06-25 PROCEDURE — 25810000003 SODIUM CHLORIDE 0.9 % SOLUTION 250 ML FLEX CONT: Performed by: INTERNAL MEDICINE

## 2025-06-25 PROCEDURE — 85025 COMPLETE CBC W/AUTO DIFF WBC: CPT

## 2025-06-25 PROCEDURE — 96375 TX/PRO/DX INJ NEW DRUG ADDON: CPT

## 2025-06-25 PROCEDURE — 25010000002 LEUCOVORIN CALCIUM PER 50 MG: Performed by: INTERNAL MEDICINE

## 2025-06-25 PROCEDURE — 25810000003 SODIUM CHLORIDE 0.9 % SOLUTION: Performed by: INTERNAL MEDICINE

## 2025-06-25 PROCEDURE — 25010000002 PALONOSETRON PER 25 MCG: Performed by: INTERNAL MEDICINE

## 2025-06-25 PROCEDURE — 25010000002 BEVACIZUMAB 100 MG/4ML SOLUTION 4 ML VIAL: Performed by: INTERNAL MEDICINE

## 2025-06-25 PROCEDURE — 25010000002 ALTEPLASE 2 MG RECONSTITUTED SOLUTION: Performed by: INTERNAL MEDICINE

## 2025-06-25 PROCEDURE — 25010000002 FOSAPREPITANT PER 1 MG: Performed by: INTERNAL MEDICINE

## 2025-06-25 RX ORDER — PALONOSETRON 0.05 MG/ML
0.25 INJECTION, SOLUTION INTRAVENOUS ONCE
Status: COMPLETED | OUTPATIENT
Start: 2025-06-25 | End: 2025-06-25

## 2025-06-25 RX ORDER — SODIUM CHLORIDE 9 MG/ML
20 INJECTION, SOLUTION INTRAVENOUS ONCE
Status: CANCELLED | OUTPATIENT
Start: 2025-06-25

## 2025-06-25 RX ORDER — PALONOSETRON 0.05 MG/ML
0.25 INJECTION, SOLUTION INTRAVENOUS ONCE
Status: CANCELLED | OUTPATIENT
Start: 2025-06-25

## 2025-06-25 RX ORDER — SODIUM CHLORIDE 9 MG/ML
20 INJECTION, SOLUTION INTRAVENOUS ONCE
Status: COMPLETED | OUTPATIENT
Start: 2025-06-25 | End: 2025-06-25

## 2025-06-25 RX ORDER — SODIUM CHLORIDE 9 MG/ML
1000 INJECTION, SOLUTION INTRAVENOUS ONCE
Status: CANCELLED | OUTPATIENT
Start: 2025-06-27

## 2025-06-25 RX ADMIN — ALTEPLASE: 2.2 INJECTION, POWDER, LYOPHILIZED, FOR SOLUTION INTRAVENOUS at 08:34

## 2025-06-25 RX ADMIN — SODIUM CHLORIDE 100 ML: 9 INJECTION, SOLUTION INTRAVENOUS at 11:44

## 2025-06-25 RX ADMIN — PALONOSETRON 0.25 MG: 0.05 INJECTION, SOLUTION INTRAVENOUS at 11:26

## 2025-06-25 RX ADMIN — SODIUM CHLORIDE 20 ML/HR: 9 INJECTION, SOLUTION INTRAVENOUS at 11:26

## 2025-06-25 RX ADMIN — LEUCOVORIN CALCIUM 450 MG: 350 INJECTION, POWDER, LYOPHILIZED, FOR SUSPENSION INTRAMUSCULAR; INTRAVENOUS at 12:46

## 2025-06-25 RX ADMIN — DEXAMETHASONE SODIUM PHOSPHATE 12 MG: 10 INJECTION, SOLUTION INTRAMUSCULAR; INTRAVENOUS at 11:27

## 2025-06-25 RX ADMIN — SODIUM CHLORIDE 240 MG: 9 INJECTION, SOLUTION INTRAVENOUS at 12:17

## 2025-06-25 RX ADMIN — FLUOROURACIL 2270 MG: 50 INJECTION, SOLUTION INTRAVENOUS at 13:16

## 2025-06-25 NOTE — PROGRESS NOTES
REASON FOR FOLLOW UP:     *. Rectal cancer, rectal ultrasound examination in July 2019 reported T3N0 disease without lymphadenopathy. She does have small pulmonary nodule 6-7 mm and 2 mm with indeterminate feature. There is no solid evidence of metastatic disease otherwise. Patient has stage IIA (T3N0M0) disease.  Post neoadjuvant chemoradiation therapy, patient underwent surgical resection of the primary rectal cancer by Dr. Ye on 12/2/2019.  Pathology evaluation reported residual T3N1 disease stage IIIb.   PET scan examination on 9/18/2020 reported multiple hypermetabolic small pulmonary nodules/ new pulmonary nodules.   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 It reported tumor mutation burden 5 Muts/Mb, microsatellite stable, TP53 mutation R282W, and others.   Patient had a telemedicine evaluation at the [facility] in February 2021.  They agreed with our treatment plan for palliative chemotherapy followed by maintenance chemotherapy.     *.The patient has heterozygous factor V Leiden, was on prophylactic anticoagulation with Lovenox 40 mg daily given her increased risk of thrombosis with MediPort and GI malignancy.   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  Patient was switched to weight-based Lovenox injection.      HISTORY OF PRESENT ILLNESS:  The patient is a 44 y.o. female with the above-mentioned history, who is seen today for evaluation.     History of Present Illness  The patient presented today 6/25/2025 for reevaluation prior to resuming chemotherapy with 5-FU, leucovorin, and Avastin.    She reports experiencing numbness and pain in her right arm after approximately 5 minutes of holding a book, writing, coloring, or driving. This numbness also occurs 5 to 6 times during the night, necessitating her to straighten her arm for relief.  She has been engaging in yard work, which involves using snips and weeding, and she is right-handed.   She has been adhering to her Lovenox regimen twice daily without any missed doses.    Her diarrhea has resolved, with her bowel movements now being soft but formed. She underwent a colonoscopy by Dr. Esparza on 06/04/2025, following which she experienced a week of discomfort. She has been increasing her physical activity, which she believes is beneficial for her bowel movements. She typically uses the bathroom in the morning and does not experience disturbances at night.    Results    Labs   - Guardant: 05/21/2025, Negative results, 0% ctDNA     - CBC: 06/25/2025, Normal CBC including neutrophils 2500 out of total WBC 4420 and normal hemoglobin 13.7, platelets 171,000   - Urinalysis: 06/25/2025, No glucose, no ketones, negative nitrite, negative protein.  Trace leukocytes.    Diagnostic Testing   - Colonoscopy: 06/04/2025, Prior colon anal anastomosis in the distal rectum, and it was a patent healthy appearing. No other abnormalities and no samples were collected.      Past Medical History:   Diagnosis Date    Abdominopelvic abscess 08/12/2020    ADMITTED TO [facility]    Abnormal Pap smear of cervix 02/02/1998    JULIUS I    Abscess of abdominal wall 11/28/2012    SEEN AT [facility] ER    Anemia in pregnancy     Anxiety     Bilateral epiphora 04/2020    Bilateral hand swelling 05/02/2020    SEEN AT [facility] ER    Cervical lymphadenitis 06/06/2012    SEEN AT [facility] ER    Chemotherapy induced diarrhea 01/2021    Chemotherapy induced neutropenia 04/2020    Chemotherapy-induced nausea 02/2020    Chemotherapy-induced thrombocytopenia 05/2020    Chronic anticoagulation     Chronic diarrhea     Colon polyps     FOLLOWED BY DR. 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 ALEXANDRU HUNT    History of gestational diabetes     History of pre-eclampsia 1998    History of pre-eclampsia     History of radiation therapy 2019    FOLLOWED BY DR. JAVON LEWIS    History of TB skin testing 01/17/2009    TB Skin Test    History of TB skin testing 01/17/2009    TB Skin Test    History of transfusion 2019    12/2019    HPV in female 1998    Hypokalemia 09/2019    Hypomagnesemia 09/2019    Hyponatremia 06/2021    IBS (irritable bowel syndrome)     Ileostomy present 04/25/2020    Take down on 11/3/2022    Infected insect bite of neck 05/27/2016    SEEN AT [facility]    Kidney stone on right side 10/07/2019    Kidney stones 08/09/2007    SEEN AT [facility] ER    Low anterior resection syndrome 12/2022    FOLLOWED BY DR. 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    Perirectal abscess 08/12/2020    Perirectal abscess 08/12/2020    Added automatically from request for surgery [id number]      Pulmonary embolism without acute cor pulmonale 09/20/2019    X 3; ADMITTED TO [facility]    Pulmonary nodule, right 05/2020    Rectal cancer 07/08/2019    STAGE IIA, INVASIVE MODERATELY DIFFERENTIATED ADENOCARCINOMA, CHEMO AND XRT FINISHED 9/2019    Right shoulder pain 11/16/2020    SEEN AT [facility] ER    Right ureteral stone 10/01/2019    SEEN AT [facility] ER    Right ureteral stone 10/01/2019    SEEN AT [facility]  ER      SAB (spontaneous ) 1996     A2-1 INDUCED    Sciatica     Sepsis due to cellulitis 2002    LEFT GREAT TOE, ADMITTED TO [facility]    Tachycardia 2020    Thrombosis of superior vena cava 2020    AND BRACHIOCEPHALIC VEIN, ADMITTED TO [facility]    Urinary urgency 2020   Patient had COVID-19 infection diagnosed on 2022 despite was fully vaccinated and boosted.  She recovered without problem.      Past Surgical History:   Procedure Laterality Date    ABDOMINAL SURGERY      CHOLECYSTECTOMY N/A 10/10/2003    LAPAROSCOPIC WITH CHOLANGIOGRAM, DR. JAMEY TALAVERA AT [facility]    COLON RESECTION N/A 2019    Procedure: laparoscopic low anterior colon resection with mobilization of splenic flexure and diverting loop ileostomy: ERAS;  Surgeon: Padmaja Esparza MD;  Location: [facility];  Service: General    COLON RESECTION N/A 2020    Procedure: LOW ANTERIOR COLON RESECTION, COLOANAL ANASTOMOSIS, MOBILIZATION SPLENIC FLEXURE;  Surgeon: Padmaja Esparza MD;  Location: [facility] MAIN OR;  Service: General;  Laterality: N/A;    COLONOSCOPY N/A 07/15/2020    PATENT ANASTAMOSIS IN MID RECTUM, RESCOPE IN 1 YR, DR. PADMAJA SEPARZA AT [facility]    COLONOSCOPY N/A 2022    DIFFUSE AREA OF MODERATELY FRIABLE MUCOSA IN ENTIRE COLON , DIVERSION COLITIS, PATENT AND HEALTHY ANASTAMOSIS, DR. PADMAJA ESPARZA AT [facility]    COLONOSCOPY N/A 2023    6 MM SESSILE SERRATED ADENOMA POLYP IN DESCENDING, PATENT ANASTAMOSIS IN DISTAL RECTUM, RESCOPE IN 2 YRS, DR. PADMAJA ESPARZA AT [facility]    COLONOSCOPY N/A 2025    PATENT AND HEALTHY ANASTAMOSIS IN DISTAL RECTUM, RESCOPE IN 2 YRS, DR. PADMAJA ESPARZA AT [facility]    COLONOSCOPY W/ POLYPECTOMY N/A 2019    15 MM TUBULOVILLOUS ADENOMA POLYP IN HEPATIC FLEXURE, 20 MMTUBULOVILLOUS ADENOMA WITH HIGH GRADE DYSPLASIA IN RECTOSIGMOID, 4 CM MASS IN MID RECTUM, PATH: INVASIVE MODERATELY DIFFERENTIATED ADENOCARCINOMA, DR. JENNIFER LI AT [facility]    DILATATION AND  EVACUATION N/A 2009    ENDOSCOPY N/A 07/08/2019    LA GRADE A ESOPHAGITIS, GASTRITIS, ALL BIOPSIES BENIGN, DR. JENNIFER LI AT [facility]    ILEOSTOMY CLOSURE N/A 11/14/2022    Procedure: ILEOSTOMY TAKEDOWN;  Surgeon: Geronimo Ye MD;  Location: [facility] MAIN OR;  Service: General;  Laterality: N/A;    INCISION AND DRAINAGE PERIRECTAL ABSCESS N/A 08/14/2020    Procedure: INCISION AND DRAINAGE OF retrorectal dehiscence abcess with drain placement and irrigation;  Surgeon: Geronimo Ye MD;  Location: [facility] MAIN OR;  Service: General;  Laterality: N/A;    PAP SMEAR N/A 01/24/2014    SIGMOIDOSCOPY N/A 07/24/2019    INFILTRATIVE PARTIALLY OBSTRUCTING LARGE RECTAL CANCER, AREA TATOOED, DR. GERONIMO YE AT [facility]    SIGMOIDOSCOPY N/A 11/23/2019    AN ULCERATED PARTIALLY OBSTRUCTING MASS IN MID RECTUM, AREA TATTOOED, DR. GERONIMO YE AT [facility]    SIGMOIDOSCOPY N/A 07/22/2021    PATENT ANASTAMOSIS IN DISTAL RECTUM, RESCOPE IN 1 YR, DR. GERONIMO YE AT [facility]    SIGMOIDOSCOPY N/A 09/11/2024    PATCHY INFLAMMATION AND EDEMA IN DESCENDING, AREA BIOPSIED AND WAS BENIGN, PATENT AND HEALTHY ANASTAMOSIS, HEMORRHOIDS,RESCOPE(CY) 12/2025, DR. GERONIMO YE AT [facility]    TRANSRECTAL ULTRASOUND N/A 07/24/2019    Procedure: ULTRASOUND TRANSRECTAL;  Surgeon: Geronimo Ye MD;  Location: [facility] ENDOSCOPY;  Service: General    URETEROSCOPY LASER LITHOTRIPSY WITH STENT INSERTION Right 10/30/2019    DR. ESTUARDO BEASLEY AT [place]    VAGINAL DELIVERY N/A 09/18/1998    VENOUS ACCESS DEVICE (PORT) INSERTION Left 08/09/2019    Procedure: INSERTION VENOUS ACCESS DEVICE;  Surgeon: Sj Joseph MD;  Location: [facility] OR [facility];  Service: General    VENOUS ACCESS DEVICE (PORT) INSERTION N/A 12/18/2020    Procedure: INSERTION VENOUS ACCESS DEVICE right side, removal venous access device left side;  Surgeon: Sj Joseph MD;  [facility];  Service: General;  Laterality: N/A;    WISDOM TOOTH EXTRACTION Bilateral 1993        HEMATOLOGIC/ONCOLOGIC HISTORY:      Rectal cancer, rectal ultrasound examination in July 2019 reported T3N0 disease without lymphadenopathy. She does have small pulmonary nodule 6-7 mm and 2 mm with indeterminate feature. There is no solid evidence of metastatic disease otherwise. Patient has stage IIA (T3N0M0) disease.  The patient is heterozygous factor V Leiden, was on prophylactic anticoagulation with Lovenox 40 mg daily given her increased risk of thrombosis with MediPort and GI malignancy.   PET scan on 8/8/2019 reported an 8 mm hypermetabolic right upper lobe pulmonary nodule, which is suspicious for metastatic as well.    Patient had a PowerPort placement on the left upper chest by Dr. Joseph on 8/9/2019.  Patient was started on concurrent infusional 5-FU chemoradiation therapy on 8/12/2019, with planned complete surgical resection and further adjuvant chemotherapy with intention to cure the disease.   Patient underwent surgical resection of the primary rectal cancer by Dr. Ye on 12/2/2019.  Pathology evaluation reported residual T3N1 disease stage IIIb.  Diarrhea related to therapy and radiation.   Patient was started cycle 1 day 1 of adjuvant FOLFOX 6 on 1/23/2020.  On 2/5/2020 FOLFOX 6 cycle 2 delayed secondary to neutropenia.  Patient was given 3 days of Granix injection.  After cycle #2 we planned 3 days of Granix with each cycle.   Patient also intolerant of oral iron.  Patient received 2 doses of IV injectafer on 02/05/2020 and 02/12/2020.   02/12/2020 Proceed with cycle #2 of FOLFOX 6 with 3 days of Granix.  On 3/11/2020 cycle 4 postponed secondary to abdominal pain and occasional low-grade fevers.  CT scans ordered.  Cycle #4 resumed after CT scan revealed no evidence of disease.  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 eY. patient has no fever.  Continue to observe.   Grade 3 hand-foot syndrome secondary to 5-FU after  cycle #7 FOLFOX 6 chemotherapy, prompting ER visit.  Also has worsening peripheral neuropathy.   Cycle #8 FOLFOX 6 was given on 5/13/2020, with 50% dose reduction for both 5-FU and oxaliplatin, and also examination of bolus 5-FU.   PET scan examination on 5/21/2020 reported no evidence of metastatic disease in the chest abdomen pelvis.  Stable 6 mm RUL pulmonary nodule.  On 5/27/2020, I discussed with the patient that we can discontinue chemotherapy at this time.   Patient had a surgical procedure for low anterior colon resection, coloanal anastomosis on 8/3/2020.  CT scan for chest abdomen pelvis on 9/9/2020 reported a new 8 mm noncalcified pulmonary nodule in the left lower lobe of the lung.  Otherwise stable right upper lobe 6 mm pulmonary nodule, and no evidence of disease recurrence in the abdomen or pelvis.  PET scan examination on 9/18/2020 reported multiple hypermetabolic small pulmonary nodules/ new pulmonary nodules.   Patient was started on pelvic chemotherapy with FOLFIRI regimen on 10/13/2020.   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 It reported tumor mutation burden 5 Muts/Mb, microsatellite stable, TP53 mutation R282W, and others.   Cycle #5 was without irinotecan, due to peripheral neuropathy.  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  Patient was switched to weight-based Lovenox injection.  Cycle #6 5-FU and irinotecan was delayed by 2 weeks because of the above incident.  Patient had a telemedicine evaluation at that the [facility] in February 2021.  They agreed with our treatment plan for palliative chemotherapy followed by maintenance chemotherapy.    PET scan examination on 4/6/2021 after cycle #12 palliative chemotherapy reported no evidence of hypermetabolic metastatic lesion.   Patient was started on maintenance chemotherapy with  5-FU and Avastin on 4/13/2021. (Unable to tolerate Xeloda because of a significant hand-foot syndrome).   PET scan on 9/24/2021 obtained after cycle #12 maintenance chemotherapy with 5-FU, leucovorin, Avastin reported no evidence for active disease. We recommended 3 months chemotherapy holiday.  CT scan examination on 3/15/2022 reported slightly disease progression with increase in size of small pulmonary nodule.  We started patient back on maintenance chemotherapy on 3/29/2022 treatment with 5-FU plus leucovorin and Avastin, repeating every 2 weeks.  After 6 more maintenance chemotherapy, CT scan for chest abdomen pelvis was done on 6/21/2022 which reported stable sub-6 mm pulmonary nodules.  Patient had last maintenance chemotherapy on 6/7/2022.   Disease progression, patient was restarted back on chemotherapy with 5-FU leucovorin and bevacizumab 8/21/2024    The patient reports she has intermittent rectal bleeding and urgency, mucous with her stool, starting sometime in 2016. At that time she was referred to GI service, and was diagnosed of irritable bowel syndrome. Nevertheless she had worsening urgency for bowel movement, and had a couple of incidents losing control of stool. She was recently seen by Roland Thorpe MD again and had colonoscopy and EGD exam on 07/08/2019. She was found having a circumferential rectal mass. According to the procedure note, the patient had a fungating circumferential bleeding 4 cm mass in the middle rectum, at distance between 13 cm and 17 cm from the anus. Mass was causing partial obstruction. There were also colon polyps found at the hepatic flexure and also at the rectosigmoid junction 23 cm from the anus. Both were resected and retrieved. EGD examination reported grade A esophagitis at the GE junction and patchy discontinuous erythema and aggravation of the mucosa without bleeding in the stomach body. There is normal mucosa of the duodenum. Pathology evaluation from this  procedure reported moderately differentiated adenocarcinoma involving the rectal mass. The rectal sigmoid junction polyp was tubular/tubulovillous adenoma with high grade dysplasia negative for invasive malignancy. The hepatic flexure polyp was also tubular/tubulovillous adenoma negative for high grade dysplasia or malignancy. The biopsy from the esophagus reports squamocolumnar mucosa with inflammatory changes suggestive of mild reflux esophagitis, negative for interstitial metaplasia. Gastric biopsy was benign and duodenal biopsy was also benign. There is no mention of H-pylori.     Patient was subsequently referred to colorectal surgeon Padmaja Ye MD for further evaluation. The patient had CT scan examination for chest, abdomen and pelvis requested by Dr. Ye and were done on 07/13/2019. The study reported no evidence of lymphadenopathy in the abdomen and pelvis. There was wall thickening of the rectosigmoid junction. Normal spleen, pancreas, adrenal glands and kidneys. There was fatty liver infiltration but no focal lymphatic lesions. There was a small 6-7 mm noncalcified nodule in the right upper lobe and a tiny 3 mm subpleural nodule in the right middle lobe. No mediastinal or hilar lymphadenopathy.     Dr. Ye performed staging rectal ultrasound examination. This study reported tumor penetrating through the muscularis propria as T3 disease; there were no lymph nodes identified.    She had a hospitalization in late September 2019 because of newly discovered pulmonary emboli.  She was on prophylactic Lovenox prior to the incident of PE.  Now she is on full dose Xarelto anticoagulation.  Patient reports no further chest pain dyspnea.  She denies bleeding or bruising.  During her hospitalization, she was seen by Dr. Ye, who plans to have surgery 8 to 12 weeks after finishing radiation therapy.  She finished her radiation on 9/19/2019.     I noticed patient also presented to the emergency room on  10/1/2019 complaining of right flank area pain.  I reviewed the images studies and indeed she has a very small 1 to 2 mm obstructing kidney stone in the UV junction.  Patient is still symptomatic with some pains and dysuria.  She denies fever sweating or chills.    Laboratory study on 10/7/2019 reported normal CBC including hemoglobin 13.1, platelets 301,000, WBC 6170 and ANC 4900 lymphocytes 590 monocytes 480.      The nurse reported malfunction of port-a-catheter on 10/7/2019.  Port study on 10/8/2019 showed fibrin sheath around the distal aspect of the Mediport catheter in the SVC. This does not appear to hinder injection, but does prevent aspiration at this time.    Patient underwent surgical resection of the colon on 12/2/2019 with Dr. Ye.  Pathology evaluation reported residual T3 disease, also 1 out of 14 lymph nodes positive for malignancy.  So this patient in either had at least stage IIIb disease (T3 N1 M0?).      Adjuvant chemotherapy FOLFOX 6 will be started on 1/22/2020.  Laboratory study reported iron saturation 10%, free iron 31 TIBC 319 and ferritin 168.  Her hemoglobin was 11.8, WBC 5600, and platelets 347,000.    Patient was here on 02/12/2020 for cycle 2 of her FOLFOX.  This is delayed x1 week secondary to neutropenia.  The patient ultimately received 3 days worth of Neupogen with recovery of her blood counts.  Of note, the patient struggled with significant nausea without any episodes of vomiting following cycle #1 of chemotherapy resulting in significant weight loss.  She is up 12 pounds over the last week as her appetite has normalized.  We will add Emend to her care plan.    She is having several loose stools per her colostomy and has been hesitant to take Imodium due to prior history of constipation.  I encouraged her to try this with a maximum of 8 tablets/day.  She denies any infectious symptoms including fevers or chills.  No excess bleeding or bruising noted.  She had the expected cold  sensitivity related to the Oxaliplatin for about 3 days following treatment.    Labs from 02/12/2020 demonstrated total white blood cell count of 5.14, ANC of 3.06, hemoglobin of 11.2, platelets of 211,000.  She was found to be iron deficient last week and is intolerant to oral iron secondary to GI distress.  For this reason, she initiated IV iron therapy with Injectafer last week.  She had received her second dose 02/12/2020    Patient has normalized hemoglobin 12.2 on 2/26/2020.    She reported on 5/5/2020 she had a recent visit to the emergency department for what was suspected to be an allergic reaction.  She called our on-call service on Saturday with reports of hand and face swelling.  She was instructed to proceed to the emergency department at which time she was assessed and prescribed a Medrol Dosepak.  She had just completed her 5-FU and was unhooked on Friday, 5/3/2020.  Her symptoms have improved.  She does report persistent hyperpigmentation and mild swelling of the palms of the hands but this is much improved.  It was her right hand specifically that was swollen.  Her facial swelling has resolved.  She continues on Cefdinir nd since has 1 day remaining, she was prescribed cefdinir for a UTI requiring hospitalization at the end of April.  Reports no new symptoms.  Her labs are stable.  She is scheduled for treatment again.    Patient states at this time she is not tolerating her chemotherapy well.     Patient seen in the emergency department on 5/2/2020 for what was suspected to be an allergic reaction.  She called our on-call service on Saturday explaining that she was experiencing hand and face swelling.  She was instructed to proceed to the emergency department at which time she was assessed and prescribed a Medrol Dosepak.  She had just completed her 5-FU and was unhooked on Friday, 5/1/2020.      She reports since her ED visit on 5/2/2020 her symptoms have not improved. Her hands and feet were  swollen upon presenting to the ED and she could barely make a fist. She states that she feels so swollen she is not able to stand it. She states that her skin on the hands and feet are peeling extensively as well, besides changing color to more dark.     She also reports significant fatigue after her first week of the 5-FU treatment but she expected this side effect. She also notices significant increase in her neuropathy to the point that she is not able to even walk around in her home without her house slippers due to irritation from her rugs. She denies and associated nausea or vomiting at this time. She also has episodes of epistaxis every day after the chemotherapy cycle #7.  She does report working full-time during the week of chemotherapy.    Laboratory studies, 5/13/2020, show moderate thrombocytopenia platelets 123,000, low normal WBC 4140 including ANC 2720 and normal hemoglobin 13.4.  Because significant hand-foot syndrome, will decrease both 5-FU and oxaliplatin by 50%, and eliminate bolus dose of 5-FU.    On 5/21/2020 patient had a PET scan performed which showed no convincing evidence of residual disease in abdomen or pelvis, no metastatic disease within the chest or neck.     Cycle #8 FOLFOX 6 was given on 5/13/2020, with 50% dose reduction for both 5-FU and oxaliplatin, and also examination of bolus 5-FU.  She states on 5/27/2020 that with the recent reduction of the chemotherapy she feels significantly better. She has more energy and is able to do her daily routine.      PET scan examination on 5/21/2020 reported no evidence of metastatic disease in chest abdomen pelvis.  There was a stable 6 mm right upper lobe pulmonary nodule.    Laboratory studies on 5/27/2020 showed mild leukocytopenia WBC 3720 but a normal ANC 2250 and lymphocytes 630.  Normal platelets 163,000 and hemoglobin 12.6.  Chemistry lab reported normal renal function, liver function panel and a electrolytes, elevated glucose  164.    Laboratory studies 6/24/2020, showed normal hemoglobin 13.4 but macrocytosis .9.  Normal platelets 210,000 and WBC 5870.  She had normal CMP.     Patient last time was here in late June 2020.  Since that time she had surgical procedure for low anterior colon resection, coloanal anastomosis on 8/3/2020.  She later developed a perirectal abscess, required surgical drainage on 8/14/2020.  She was discharged on 8/27/2020.    Patient reports to me she still has lower abdominal wall vacuum suction in place.  She denies fever sweating chills.  Performance status is ECOG 1.  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  She does have visiting nurse come to the home for wound care.    CT scan for chest abdomen pelvis on 9/9/2020 reported a new 8 mm noncalcified pulmonary nodule in the left lower lobe.  Otherwise stable right upper lobe 6 mm pulmonary nodule, and no evidence of disease recurrence in the abdomen or pelvis.     Laboratory study on 9/16/2020 reported elevated AST 55, ALT 95, alk phosphatase 143 but normal total bilirubin 0.3.  Chemistry lab otherwise unremarkable.  She has completed normal CBC.      Because of the abnormal CT scan examination for chest abdomen pelvis on 9/9/2020 reported a new 8 mm noncalcified pulmonary nodule in the left lower lobe, we obtained a PET scan examination for further evaluation, which was done on 9/18/2020.  This study reported several pulmonary nodules, some of them are hypermetabolic, newly developed compared to previous PET scan in May 2020.  Certainly does highly suspicious for metastatic disease.  There are also hypermetabolic activity in the abdomen and pelvic area which is hard to differentiate from surgical scar versus metastatic malignancy.    Laboratory study on 10/13/2020 reported normal CBC with Hb 13.9, platelets 302,000 and WBC 5520 including ANC 3830.  Chemistry lab reported normalized AST 20, alk phosphatase 116 improved ALT  35, and maintains normal bilirubin, with normal electrolytes and liver function panel.     Patient was started on first cycle of palliative chemotherapy FOLFIRI on 10/13/2020.    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  Patient had a new port placed 12/18/2020, and had held her Xarelto for 2 days prior to surgery.  She presented to the ER on 12/21/20 with complaints of facial and arm swelling for 3 days.  She also noted increased shortness of breath.  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  Thrombus within the right internal jugular vein cannot be excluded.  Patient was started on IV heparin, and eventually transitioned to weight-based Lovenox, which she now continues.    PET scan examination on 9/24/2021 reported further shrinking of the tiny right upper lobe pulmonary nodule.  Otherwise no new lesions.  No evidence for metastatic disease in other areas.      Patient had CT scan for chest with IV contrast obtained on 12/14/2021 which reported small tiny stable pulmonary nodules. There is a 4 mm right upper lobe pulmonary nodule. There is also a stable 4 mm left lower lobe pulmonary nodule. There is also stable left upper lobe micronodule.     Laboratory studies today on 12/21/2021 reported normal hemoglobin 15.9 however .0, platelets 218,000 WBC 5360 including neutrophils 3730 lymphocytes 980. Chemistry lab reported normal renal function, electrolytes, glucose, and marginally elevated ALT 55, AST 34 but normal total bilirubin and alk phosphatase.     CT scan for chest abdomen pelvis 6/21/2022 reported sub-6 mm pulmonary nodules either stable or slightly smaller.  No new pulmonary nodules or evidence for disease progression.  There is no evidence for metastatic disease in the liver however it shows diffuse  hepatic steatosis.  There was masslike thickening in the presacral space with the clips or calcification approximately 1.7 cm in greatest AP dimension but stable.    Laboratory study on 9/20/2022 reported normal hemoglobin 15.7 with mild macrocytosis .1.  She has normal CBC and platelets.  She also has unremarkable CMP.  Normal CEA 1.13 ng/mL.    Patient was seen by Dr. Ye, who performed ileostomy takedown on 11/14/2022.  Patient reports that she is recovering.  She still has a small open wound less than 1 cm in diameter, however close to 2 cm deep.  She has been changing dressing herself.  She denies fever sweating chills.    Patient has no other specific complaints.  She has excellent performance status ECOG 0.  She denies chest pain dyspnea cough hemoptysis.  No abdominal pain.  No melena hematochezia.  Patient has been eating well, stable weight.  She works full-time.    She continues Lovenox injections and denies any significant bleeding issues.   No other new problems or concerns.     CT scan for chest abdomen pelvis obtained on 1/10/2023 reported stable small pulmonary nodules, no evidence for active or new disease.    Laboratory study on 1/10/2023 reported normal CBC and normal CMP.  CEA level is 0.99 ng/mL.    CT scan for chest, abdomen, and pelvis on 7/7/2023 reported stable small pulmonary nodules including a left upper lobe 5 mm nodule. There were no new masses, or pleural effusion. No enlarged supraclavicular, axillary, mediastinal, or hilar lymphadenopathy. Mediastinal vasculature normal. There is occlusion of the superior vena around the catheter with body wall  is present. There was no evidence for disease recurrence in the abdomen or pelvis. Bone is also negative for metastatic disease.    Laboratory studies obtained on 07/07/2023 also reported normal CBC, and normal CMP as well as low level CEA 1.07 ng/mL.    The CT scan for the chest on 10/27/2023 reported enlarging pulmonary  nodules bilaterally. The right upper lobe lung nodule increased from 7 x 7 mm to 9 x 8 mm, and the left upper lobe nodule measured 8 x 9 mm from 5 x 6 mm in 04/2023. There is a different left upper lobe nodule 6 x 8 mm from previous 5 x 5 mm. The presacral tissue thickness measures up to 2.3 cm.    A laboratory study on 10/27/2023 reported CEA 1.58 ng/mL, normal CBC, and CMP.  Molecular study of peripheral blood Guardant Reveal is still pending.    The patient presents today on 11/17/2023 for reevaluation to discuss the results of PET scan examination as well as the results of tumor conference. I saw her recently on 11/03/2023 and because of enlarging pulmonary nodules on the CT scan, we requested a PET scan examination. I presented her case yesterday on 11/16/2023 at the [facility].    The patient reports she is at her baseline condition as 2 weeks ago. No specific complaints, no chest pain, dyspnea, cough, etc. She has a regular bowel movement.    Her case was present at the [facility]. on 11/16/2023. The PET scan images and chest CT scan were reviewed in conjunction with her treatment history. The consensus was for patient to receive stereotactic radiation therapy for the right upper lobe lung lesion, and the bigger left upper lobe lesion, and monitor the smaller left upper lobe lesion with further images studies down the line. Also, the conference recommended Guardant Reveal study which we already ordered.     This Guardant Reveal study came back today as negative for ctDNA 0%.      CT of the chest on 2/2/2024 reported shrinking of the right upper lobe lesion from 9 mm to 6 mm, and the left upper lobe lesion also decreased from 9 mm to 6 mm. Otherwise, there are a couple of stable pulmonary nodules, 7 to 8 mm. The right hilar has a small lymph node that has increased from 6 mm to 8 mm and is otherwise stable. There was no suspicious lesion in the abdomen or  pelvis.    Colonoscopy examination 9/11/2024 showed patchy mild inflammation characterized by congestion/edema in the descending colon. Evidence of previous endo coloanal anastomosis in the rectum. Presence of hemorrhoids.      MEDICATIONS    Current Outpatient Medications:     clonazePAM (KlonoPIN) 1 MG tablet, Take 1 tablet as needed daily for anxiety. May take one additional as needed for severe anxiety., Disp: 45 tablet, Rfl: 3    Cyanocobalamin (VITAMIN B-12 PO), Take 1 tablet by mouth 3 (Three) Times a Week. M-W-F, Disp: , Rfl:     dicyclomine (BENTYL) 20 MG tablet, TAKE 1 TABLET BY MOUTH EVERY 6 (SIX) HOURS AS NEEDED FOR IRRITABLE BOWEL SYMPTOMS, Disp: 360 tablet, Rfl: 2    diphenoxylate-atropine (LOMOTIL) 2.5-0.025 MG per tablet, TAKE 2 TABLETS BY MOUTH 4 TIMES A DAY, Disp: 240 tablet, Rfl: 5    enoxaparin sodium (LOVENOX) 100 MG/ML solution prefilled syringe syringe, INJECT 1 ML UNDER THE SKIN INTO THE APPROPRIATE AREA AS DIRECTED EVERY 12 (TWELVE) HOURS., Disp: 60 mL, Rfl: 2    escitalopram (LEXAPRO) 10 MG tablet, Take 1 tablet by mouth Daily., Disp: 90 tablet, Rfl: 1    famotidine (PEPCID) 20 MG tablet, TAKE 1 TABLET BY MOUTH TWICE A DAY, Disp: 180 tablet, Rfl: 2    HYDROcodone-acetaminophen (NORCO) 5-325 MG per tablet, Take 2 tablets by mouth Every 6 (Six) Hours As Needed for Moderate Pain., Disp: 90 tablet, Rfl: 0    hydrocortisone 2.5 % cream, APPLY RECTALLY 3 TIMES DAILY. INCLUDE APPLICATOR., Disp: , Rfl:     Hydrocortisone, Perianal, (ANUSOL-HC) 2.5 % rectal cream, Apply rectally 3 times daily.  Include applicator., Disp: 30 g, Rfl: 1    Loperamide HCl (IMODIUM PO), Take  by mouth As Needed., Disp: , Rfl:     Loratadine 10 MG capsule, Take 1 capsule by mouth Every Evening., Disp: , Rfl:     metoprolol succinate XL (TOPROL-XL) 25 MG 24 hr tablet, TAKE 0.5 TABLETS BY MOUTH EVERY NIGHT, Disp: 45 tablet, Rfl: 2    nystatin (MYCOSTATIN) 783531 UNIT/GM cream, Apply 1 Application topically to the  "appropriate area as directed 2 (Two) Times a Day., Disp: 30 g, Rfl: 2    nystatin-zinc oxide-hydrocortisone-bacitracin, Apply topically to the appropriate area as directed Daily As Needed., Disp: 60 g, Rfl: 3    octreotide (sandoSTATIN) 100 MCG/ML injection, INJECT 1 ML UNDER THE SKIN INTO THE APPROPRIATE AREA AS DIRECTED 3 TIMES A DAY, Disp: 90 mL, Rfl: 2    ondansetron (ZOFRAN) 8 MG tablet, TAKE 1 TABLET BY MOUTH THREE TIMES A DAY AS NEEDED FOR NAUSEA AND VOMITING, Disp: 60 tablet, Rfl: 1    pantoprazole (PROTONIX) 40 MG EC tablet, TAKE 1 TABLET BY MOUTH EVERY DAY, Disp: 90 tablet, Rfl: 1    rosuvastatin (CRESTOR) 5 MG tablet, Take 1 tablet by mouth Daily., Disp: 90 tablet, Rfl: 0    Syringe/Needle, Disp, (BD SafetyGlide Syringe/Needle) 27G X 5/8\" 1 ML misc, Use 1 mL 3 (Three) Times a Day As Needed (as needed for diarrhea)., Disp: 100 each, Rfl: 1  No current facility-administered medications for this visit.      ALLERGIES:   No Known Allergies      SOCIAL HISTORY:       Social History     Tobacco Use    Smoking status: Every Day     Current packs/day: 1.00     Average packs/day: 1 pack/day for 25.0 years (25.0 ttl pk-yrs)     Types: Cigarettes     Passive exposure: Current    Smokeless tobacco: Never    Tobacco comments:     1 PPD/caffeine use    Vaping Use    Vaping status: Former    Substances: Nicotine    Devices: Disposable    Passive vaping exposure: Yes   Substance Use Topics    Alcohol use: Yes     Comment: RARELY    Drug use: Never         FAMILY HISTORY:   Mother has positive factor V Leiden mutation, although she did not have thrombosis, mother also has coronary disease with stenting, she also has occasional bruising.    Maternal grandmother had DVT, she had multiple surgical procedures.    Patient mother's half-brother had metastatic colon cancer at diagnosis in his 50s.    Maternal great aunt had breast cancer.             Vitals:    06/25/25 0847   BP: 110/69   Pulse: 74   Resp: 16   Temp: 97.6 °F " "(36.4 °C)   TempSrc: Oral   SpO2: 98%   Weight: 80 kg (176 lb 6.4 oz)   Height: 167.5 cm (65.95\")   PainSc: 0-No pain           6/25/2025     8:44 AM   Current Status   ECOG score 0     Physical Exam  Constitutional: No acute distress  Musculoskeletal: Swelling in the right hand  GENERAL:  Well-developed, well-nourished in no acute distress.    SKIN:  Warm, dry without rashes, purpura or petechiae.  HEENT:  Normocephalic.   LYMPHATICS:  No cervical, supraclavicular or axillary adenopathy.  CHEST: Normal respiratory effort.  Lungs clear to auscultation. Good airflow.  CARDIAC:  Regular rate and rhythm. Normal S1,S2.  ABDOMEN:  Soft, no tender.  Bowel sounds normal.  EXTREMITIES:  No lower extremity edema.    RECENT LABS:  Results from last 7 days   Lab Units 06/25/25  0837   WBC 10*3/mm3 4.42   NEUTROS ABS 10*3/mm3 2.50   HEMOGLOBIN g/dL 13.7   HEMATOCRIT % 42.6   PLATELETS 10*3/mm3 171     Lab Results   Component Value Date    GLUCOSE 101 (H) 06/25/2025    BUN 7.3 06/25/2025    CREATININE 0.57 06/25/2025     06/25/2025    K 3.7 06/25/2025     06/25/2025    CALCIUM 8.9 06/25/2025    PROTEINTOT 6.3 06/25/2025    ALBUMIN 3.9 06/25/2025    ALT 16 06/25/2025    AST 18 06/25/2025    ALKPHOS 95 06/25/2025    BILITOT 0.2 06/25/2025    GLOB 2.4 06/25/2025    AGRATIO 1.6 06/25/2025    BCR 12.8 06/25/2025    ANIONGAP 10.4 06/25/2025    EGFR 114.4 06/25/2025     Urinalysis without microscopic (no culture) - Urine, Clean Catch (06/25/2025 08:55)             IMAGING:  CT Chest With Contrast Diagnostic (05/14/2025 10:45)     Duplex Venous Upper Extremity - Right CAR    Normal right upper extremity venous duplex scan.      Assessment & Plan      ASSESSMENT:   1.  Metastatic rectal cancer.   Initial rectal ultrasound examination reported T3N0 disease without lymphadenopathy.   CT scan of chest, abdomen and pelvis reported no lymphadenopathy in the abdomen, pelvis or chest. She does have small pulmonary nodule 6-7 mm and " 2 mm with indeterminate feature. There is no solid evidence of metastatic disease otherwise.   Based on the CT scan and rectal ultrasound imaging studies, this patient had stage IIA (T3N0M0) disease.   She had PET scan examination on 8/8/2019 which reported a hypermetabolic right upper lobe pulmonary nodule 6 mm with SUV 5.6.  This is suspicious for metastatic disease however it is too small to be biopsied.  This patient may have stage IV disease.   She initiated concurrent radiation with continuous 5-FU on 8/12/2019.  Patient finished concurrent chemoradiation therapy.  Patient underwent surgical resection of the rectal tumor and diverting loop ileostomy on 12/2/2019 with Dr. Ye.  Pathology evaluation reported residual T3 disease, with 1 out of 14 lymph nodes positive for malignancy.  Certainly this patient has at least stage IIIb rectal cancer (T3N1M0?)  On 1/22/2020 adjuvant chemotherapy FOLFOX 6 regimen initiated.   On 2/5/2022 cycle #2 was delayed secondary to neutropenia.  She was given 3 days of Granix.   Emend added with cycle 3.  With improved nausea control  Continuing home Granix x3 days following 5-FU disconnect  3/11/2020 due for cycle 4, however, she is experiencing progressive abdominal pain and occasional fevers.   CT scan performed on 3/13/2020 reveals no evidence of progressive or recurrent disease.  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  Patient hospitalized 4/24-4/26/20 after cycle 5 of chemotherapy with acute UTI.  CT abdomen/pelvis noting fluid collection in the presacral region having diminished in size compared to CT dated 3/13/2020.  Patient was evaluated by Dr. Ye while in the hospital with further plans to evaluate possible colovaginal fistula following completion of chemotherapy.  Patient did respond to IV antibiotics and discharged home on oral cefdinir.  4/29/2020 cycle 6 of FOLFOX.  Urinary symptoms have resolved   Patient seen in the [facility] ED on 5/2/2020 for what was  suspected to be an allergic reaction.  She called our service on Saturday explaining that she was experiencing hand and face swelling.  She was instructed to proceed to the emergency department at which time she was assessed and prescribed a Medrol Dosepak.  She had just completed her 5-FU and was unhooked on Friday, 5/1/2020.  Her symptoms have resolved in the office on assessment, 5/5/2020.  The patient had grade 3 hand-foot syndrome based on symptomology.  Patient had cycle #8 of 5-FU on 5/13/2020. Due to her symptoms and poor tolerance to the 5-FU, her treatment dose will be reduced 50% for oxaliplatin and infusional 5-FU.  Bolus 5-FU will be discontinued..  On 5/21/2020 patient had a PET scan and it showed no evidence of of metastatic disease in the neck, chest, abdomen or pelvis.  There was fluid accumulation/abscess.   On 5/27/2020 discussed with the patient that we can discontinue chemotherapy at this time.  She will follow-up with Dr. Ye to discuss a possibility to reverse the ileostomy.  We likely will obtain anther PET scan in 3 to 4 months for reassessment.    Patient was seen by Dr. Ye on 6/19/2019 for evaluation and to discuss possible take down of her ileostomy.  She is scheduled to have a gastrografin enema on 7/2/2020 to evaluate for a fistula, and then a colonoscopy on 7/15/2020, both done by Dr. Ye.  She states that based on the above imaging and procedure findings, she may have a more extensive surgery done or just have her ileostomy reversed, which would likely be done in August 2020.  This will all be coordinated under the care of Dr. Ye.     CT scan from 09/09/2020 and also compared to her previous PET scan examination from 05/21/2020 and also the original PET scan from 08/09/2019.  The original PET scan there is a small right upper lobe pulmonary nodule 6 mm with SUV 5.6. So in the 05/2020 PET scan the nodule is still there but activity seems much less and this most recent CT scan  examination also documented the preexisting small pulmonary nodule however there is a new left lower lobe pulmonary nodule 8 mm in size and I shared with the patient today this nodule is suspicious for metastatic disease. Laboratory studies reported minimal CEA level 1.32 on 09/09/2020. Liver function panel was only marginally abnormal with the ALT 45, the remaining is normal. However laboratory study today showed worsening AST 55, ALT 95 and alkaline phosphatase 143 but is still normal, total bilirubin 0.3.   Recommended to have repeat PET scan examination for assessment because the left lower lobe pulmonary nodule is too small to be biopsied, and if the PET scan reports hypermetabolic lesion, I recommended to have stereotactic radiation therapy. Explained to patient that she is not a really good candidate to have thoracotomy for resection because she still has unhealed wound in the abdomen. Stereotactic radiation therapy will likely be a more feasible choice.   PET scan examination obtained on 9/18/2020 showed metastatic disease.  It reported a few hypermetabolic pulmonary nodules, and some new small pulmonary nodules, all of them highly suspicious for metastatic disease.  She also has hypermetabolic activity in the abdomen and pelvic area which could be related to scar tissue from her recent surgery versus metastatic disease.  Nevertheless overall picture fits with metastatic cancer.  Discussed with the patient on 9/20/2020, we reviewed the PET scan images together, and I recommended to have systematic chemotherapy, I do not think stereotactic reading therapy to the hypermetabolic lesions in the lungs warranted at this time because even those will be treated with reading therapy, she will still need systematic chemotherapy.  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  I did discuss with the patient using anti-EGFR monoclonal antibody versus anti-VEGF monoclonal antibody.      Palliative chemotherapy cycle#1 FOLFIRI was started on 10/13/2020.  No bolus 5-FU given considering previous poor tolerance.    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  Microsatellite stable, mutation burden 5/Mb.   Discussed with the patient 10/27/2020, because of the K-s[place] mutation, she is not a candidate for anti-EGFR monoclonal antibody such as Erbitux or vectibix.  She could be a candidate for anti-VEGF monoclonal antibody, however because of active wound, she is not a candidate right now at this moment.  Patient seen in the ED for acute right neck pain on 11/16/2020.  CT angiogram as well as CT of the cervical spine performed with no acute findings but notably one of her pulmonary nodules, left upper lobe, somewhat decreased in size.  Patient given prednisone pack.  Further details below.  Cycle #6 chemotherapy will be resumed on 1/5/2021.  Started back on original irinotecan.  PET scan examination obtained on 1/12/2021 after cycle #6 chemotherapy reported improved pulmonary nodule hypermetabolic activity.  Confirmed these are metastatic lesions.  Patient is evaluated 1/19/2020, will continue cycle #7 FOLFIRI chemotherapy.  However Avastin will be on hold see next section.   Patient was reevaluated on 2/2/2021, will continue chemotherapy cycle #8 FOLFIRI.  Avastin / biosimilar will be added.    She talked to [facility] specialist in mid February 2021, and had recommended palliative chemotherapy without surgical intervention of metastatic lung lesions.   On 3/2/2021, patient will proceed with cycle #10 FOLFIRI plus bevacizumab.  After 12 cycles, plan to start maintenance treatment.  3/16/2021 cycle 11.  Plus bevacizumab.  3/30/2021 cycle 12 FOLFIRI plus bevacizumab.  Having issues with worsening nausea despite premeds with dexamethasone, Aloxi, Emend, and Zofran at home.  Patient is requesting a dose of Phenergan, which is helped her in the past.  We  will give this to her with treatment today.  PET scan examination on 4/6/2021 reported no evidence of hypermetabolic metastatic lesion.  Discussed with the patient on 4/13/2021, we reviewed the PET scan results.  We recommended to switch to maintenance chemotherapy without irinotecan.  We will continue 5-FU and Avastin every 2 weeks.  We discussed possibility of switching to oral Xeloda treatment.  Discussed with the patient for side effects more so with hand-foot syndrome.  Patient is agreeable.   Xeloda 2000 mg twice daily 7 days on, 7 days off along with Avastin initiated 4/27/2021.  After only 5 doses of Xeloda significant hand-foot syndrome, Xeloda held. Patient requesting to be transitioned back to infusional 5-FU, as she felt that she tolerated infusional 5-FU without any problem.  The patient will receive 5-FU leucovorin and Avastin every 2 weeks.  Xeloda discontinued.  5/25/2021 continued cycle #4 maintenance 5-FU, leucovorin, Avastin tolerating this much better so far, we will continue this. Recommended to have 12 cycles of maintenance chemotherapy, then obtain PET scan for reevaluation.  We could discuss further treatment plan at that time.  9/14/2021 patient due for cycle 12 of additional maintenance 5-FU/leucovorin plus Avastin.  She continues to tolerate treatment well overall.  She is anxious in the office today with her Mediport not getting blood at first and also with upcoming scans being heavy on her mind.  She was instructed to take one of her Klonopin pills while here to help calm her mind.  Heart rate did improve from 140s down to 112.  Proceed with treatment today as scheduled.  PET scan examination on 9/24/2021 reported further shrinking of the right upper lobe tiny pulmonary nodule.  No other new lesions.  Discussed with patient on 9/24/2021 about further shrinking of the right upper lobe pulmonary nodule and no evidence for other new lesions. We recommended to have chemo break for 3 months  with repeated CT scan for reevaluation.  Recent CT scan for chest 12/14/2021 and abdomen CT from 11/29/2021 reported no evidence for active disease. The right upper lobe pulmonary nodule, left lower lobe pulmonary nodule minimal about 4 mm and stable. I discussed with patient today on 12/21/2021, recommended to give her another 3 months chemotherapy holiday and obtain CT scan afterwards for reevaluation. After discussion, patient is agreeable.   CT scan examination for chest abdomen and pelvis obtained on 3/15/2022 reported a slight increase of the left upper lobe pulmonary nodule 5 mm, from previous 3 mm.  The other pulmonary nodules were stable in size.  There is also small left upper lobe groundglass changes.   Dr. Nguyen reviewed images studies with the patient 3/23/2022, compared to multiple previous images including CT chest CT and PET scan, suspected disease progression.  Discussed with the patient, and I recommended to resume maintenance chemotherapy with 5-FU plus leucovorin plus Avastin repeating every 2 weeks, and obtaining CT scan for reassessment in 3 months.  Patient is agreeable.  Patient resumed maintenance chemotherapy on 3/29/2022 with 5-FU leucovorin and Avastin.   4/26/2022, proceed with cycle #27 maintenance 5-FU, leucovorin, and Avastin.  Patient continues to tolerate treatment well.    On 5/10/2022, we will proceed ahead with cycle #28 maintenance chemotherapy.  Plan to have CT scan after cycle #30.  5/24/2022 cycle 29 maintenance chemotherapy.  Continue to tolerate well.  6/7/2022 cycle #30 maintenance chemotherapy, continuing to tolerate well.   CT scan for chest abdomen pelvis with IV contrast obtained on 6/21/2022 reported sub-6 mm pulmonary nodules either stable or slightly smaller.  We reviewed the images with the patient together.  We discussed with the patient and recommended to hold chemotherapy for now with repeating CT scan in 3 months for reassessment.  CT scan of the chest abdomen  pelvis 9/13/2022 reported stable condition, no evidence for recurrent or metastatic colon cancer.  On 1/10/2023 patient had a CT chest abdomen pelvis with reported no evidence for disease progression.  Laboratory study also showed normal CEA.   Patient had a CT scan for chest, abdomen, and pelvis obtained on 04/11/2023. This study reported very small pulmonary nodules, the right upper lobe nodule is stable to 6 mm, however, the left upper lobe and the left lower lobe nodule increased to 5 mm from previous 4 mm. I discussed with the patient today on 04/19/2023, I recommend to obtain another CT scan in 3 months for reassessment. The nodules are so small, they likely will not be picked up by PET scan examination.  CT scan examination of the chest, abdomen, and pelvis obtained on 7/7/2023 reported stable small pulmonary nodules. No evidence for disease progression or new lesions in chest, abdomen, and pelvis.  Discussed with patient today on 7/14/2023, however, patient is concerning for disease progression. I recommended to obtain Guardant Reveal for circulating tumor DNA study. If the study is positive, we will further obtain PET scan examination, otherwise, we will obtain CT scan for chest, abdomen, and pelvis in 3 months for reassessment.  The patient had CT scan for the chest, abdomen, and pelvis obtained on 10/27/2023, which reported worsening pulmonary nodules at bilateral upper lobes. Laboratory studies showed low normal CEA level and normal liver function panel. The Guardant Reveal study is still pending. I discussed this with the patient on 11/03/2023 and we shared the images, and I recommended having a PET scan examination for further assessment. I will present her case at the multimodality lung conference. If those lesions are deemed to be metastatic lesions, I recommend having stereotactic radiotherapy. I discussed it with the patient, and she voiced understanding and was agreeable with that strategy.  I  presented her case at the multimodality chest conference 11/16/2023.  The consensus was for patient to receive stereotactic radiation therapy for the right upper lobe lung lesion, and the bigger left upper lobe lesion, and monitor the smaller left upper lobe lesion with further images studies down the line. Also, the conference recommended Guardant Reveal study which we already ordered. I discussed with the patient today on 11/17/2023, we explained to the patient that the opinion of the conference and she is agreeable with this and prefers this strategy because of limited toxicity compared to long term commitment to starting chemotherapy again. I recommend to obtain a CT scan for the chest, abdomen, and pelvis with both IV and oral contrast for reassessment in 3 months for reassessment of metastatic lung lesions and thickness in the pelvis where the PET scan reported a low activity SUV 2.4 in the surgical bed.   The patient had steroid-induced radiation therapy for the right upper lobe and one of the left upper lobe lesions.  Her CT scan examination on 02/02/2024 reported shrinking nodules of the right upper lobe and one of the left upper lobe lesions, both from 9 mm down to 6 mm. There are 2 stable pulmonary nodules 7 mm. Nothing new.  02/09/2024, I discussed with the patient and recommended CT scan for the chest, abdomen, and pelvis in 3 months for reassessment. Guardant Reveal study was 0% ctDNA. We will also continue laboratory studies every 3 months for Guardant Reveal, together with routine labs, CBC, CMP, and CEA.  CT scan of the chest, abdomen, and pelvis conducted on 4/23/2024 revealed stable multiple pulmonary nodules, with no other new pulmonary nodules or evidence of disease recurrence or abnormal pelvis. The patient's liver function panel was normal, and low-level CEA level was also noted. The Guardant Reveal study was able to be obtained earlier due to regulatory rules, and we hugo obtain today the  Guardant reveal study, be available within 10 to 14 days.   Given the patient's metastatic liver lesion, and lung lesions from colon cancer, careful monitoring is necessary. A chest CT scan with IV contrast will be arranged in 3 months for reevaluation. The study will be reviewed again in 3 months, which will include CBC, CMP, and CEA level.   Imaging study with chest CT scan on 08/02/2024 reported multiple bilateral pulmonary nodules that are relatively stable. However, the Guardant Reveal reported positive ctDNA indicating disease progression. A subsequent PET scan examination on 08/14/2024 reported newly developed hypermetabolic pulmonary nodules. The highest SUV is 3.7, corresponding to an 8 mm new right upper lobe nodule, and there are several other hypometabolic nodules in the lungs.   8/21/2024 resumption of chemotherapy with 5-FU bevacizumab  Triage visit 8/28/2024 with intractable diarrhea that was unclear if this is secondary to chemotherapy or infectious etiology given her known chronic colitis, fever and abdominal pain.  Further management as outlined below  9/4/2024 per review today diarrhea has improved though she is having some difficulty with passage of gas and stool,?  Related to significant inflammation in the rectum versus tumor obstruction.  The passage of gas has improved in the last few days but she does still have to change positions on the toilet to get stool to pass without it being painful.  Discussed all of this with Dr. Nguyen and we will refer the patient urgently back to Dr. Ye for at the very least rectal examination in the office versus full repeated colonoscopy.  Because of the potential for examination or invasive procedures, we will hold bevacizumab today.  Because the patient had significant diarrhea last week and concerns that it may be related to chemotherapy we will decrease her 5-FU/leucovorin today by 30%.  If she does well we can go back to full dose with cycle 3.      9/18/2024 she presented for evaluation prior to cycle #3 of palliative chemotherapy with 5-FU, leucovorin, and bevacizumab. Given her recent biopsy on 09/11/2024, it is prudent to postpone the Avastin treatment today to ensure safety.  Chemotherapy will be proceeded.      She presented for evaluation on 10/02/2024, tolerating chemotherapy except for diarrhea. This has been managed with octreotide. Proceed with cycle 4 chemotherapy today with 5-FU, leucovorin and resumption of bevacizumab.   Reevaluation 10/16/2024 due for cycle 5 5-FU, leucovorin, bevacizumab.  Due to ongoing significant diarrhea, bevacizumab will be placed on hold for potential surgical intervention.  Additionally, 5-FU will be dose reduced to 50%.  Additional adjustments as outlined below  Patient reviewed 10/30/2024 with improved tolerance to cycle five 5-FU, leucovorin.  We we will attempt to slightly escalate 5-FU dosing, increasing by 10% (40% dose reduction)  Patient did experience leakage of 5-FU, and return 10/31/2024 for evaluation of this.  She was sent for port dye study that showed correct location of her port, so 5-FU was resumed.  The patient's right rib pain is likely due to a new chair on the bone, as indicated by the PET scan. The PET scan revealed a new 2.8 x 1.3 cm groundglass opacity in the lateral aspect of the left apex with SUV 6.3. This appearance is nonspecific and may represent an infectious or inflammatory infiltrate. Continued monitoring and follow-up imaging are recommended.  The current regimen of 5-FU will be maintained, and Avastin will be reintroduced with full dose. 11/13/2024 will proceed ahead with 5-FU, leucovorin and bevacizumab.   12/4/2024 She also reports two bad days of diarrhea, likely due to Avastin. The dosage of Avastin will be reduced to 4 mg/kg. The 5-FU dosage will be increased to 1680 mg/m².   12/18/2024 proceed with 5-FU and Avastin continuing previous dose reductions as she had excellent  tolerance to treatment 2 weeks ago.   12/31/2024 patient reports tolerating chemotherapy with the same dose adjustment.  This is a 1 day early because of the closure of the New Year's day holiday tomorrow.  Next cycle will be resumed in 15 days back to her usual schedule.  Will plan to have CT scan for chest abdomen pelvis with IV contrast in early February 2024.  1/29/2025 proceed with 5-FU and bevacizumab maintaining previous dosing.  CT scan for chest abdomen pelvis on 1/31/2025 reported stable small pulmonary nodules.  No evidence for disease progression.  Due to profound diarrhea from chemotherapy, we decided to postpone next chemotherapy to 3/5/2025.    3/5/2025 patient has improved diarrhea. Chemotherapy will be resumed with reduction of 5-FU at 1440 mg/m² and bevacizumab at 3 mg/kg and the leucovorin at 240 mg/m².  She will continue octreotide 100 mcg every 8 hours for chemotherapy-induced diarrhea, along with Imodium and Lomotil as needed.   3/19/2025 proceed with chemotherapy at previous dosing.  She tolerated most recent cycle of chemotherapy very well.    on 4/9/2025 patient presented for evaluation.  Laboratory studies reported normal CBC and unremarkable CMP.  Patient has been generally tolerating well except for some issue with her diarrhea.  We recommended to continue with the current dosage of Avastin and 5-FU with leucovorin. A scan is scheduled for mid-May 2025 for reassessment of her disease.  4/23/2025 hold chemotherapy due to profound diarrhea and weight loss following most recent cycle of treatment.   5/21/2025 patient reports that her condition remains stable.  CT scan of the chest reported the largest lung nodule is 6 mm.  No evidence of pulm metastatic disease in the abdomen or pelvis.  Discussed with the patient that her chemotherapy would be temporarily halted.    6/25/2025 The patient is scheduled to resume chemotherapy with 5-FU, leucovorin, and Avastin. The dosage of 5-FU will be  reduced by an additional 10%, resulting in a total 50% dose reduction compared to the baseline. Chemotherapy will continue every 2 weeks. If the Guardant results remain negative, a break from chemotherapy may be considered after another 2-3 months. The goal of the treatment is to manage the patient's condition while minimizing side effects. Potential side effects of the chemotherapy include gastrointestinal disturbances, fatigue, and hematologic abnormalities. Supportive care measures include monitoring blood counts and managing symptoms as they arise.      2.  Intractable diarrhea due to chemotherapy.   Patient developed diarrhea on Saturday, 8/24/2024.  Is unclear if this is related to infection as she did have fevers at the time the diarrhea began or chemotherapy.  She does have chronic colitis noted on most recent PET scan, this therefore could be diverticulitis flare  GI panel and C. difficile negative  8/29/2024 she did receive a single dose of octreotide  Begin empiric Flagyl 500 mg 3 times daily x 10 days  8/30/2024 discussed negative stool studies.  We will add Levaquin to already prescribed Flagyl to complete management of diverticulitis.  It is unclear if the patient's symptoms are related to diverticular flare given her previous abdominal pain and fever versus chemotherapy.  However, her symptoms are currently improved  9/4/2024 diarrhea has resolved.  Stools are now more soft with some form but she is having difficulty with passage of stool, having to change positions on the toilet to avoid pain.  We are referring back to Dr. Ye as detailed above.  She will finish out the Flagyl and Levaquin as prescribed having roughly 3 days left of both.  We will also adjust doses of 5-FU/leucovorin today with concern for potential chemotherapy-induced diarrhea.  If she does well this cycle we can increase back to full dose with cycle 3 per Dr. Nguyen.  On 9/18/2024 patient reports that she experienced significant  diarrhea during cycle #2 chemotherapy on 09/04/2024, leading to a 30% dose reduction. Despite taking Lomotil 2 tablets four times a day, Imodium, and Pepto-Bismol, her diarrhea persisted. She received octreotide shots twice, which improved her symptoms from watery to mushy stools but did not fully resolve the issue. She will continue with the current regimen and consider adding octreotide to her home regimen if diarrhea starts at home. The potential side effects of octreotide, including nausea, were discussed.   10/1/2024 she reports using octreotide self-injection at home, which has significantly improved her diarrhea. Most of the time, she only requires it once a day and occasionally twice a day, depending on the severity of her diarrhea. She is satisfied with the results, which have improved her quality of life and ability to eat properly.   She will also use Lomotil and the Imodium as needed.  Patient is very tearful in the office 10/16/2024 regarding debilitating diarrhea and interference with quality of life.  She questions potential surgical intervention and placement of colostomy due to ongoing pain in her rectum and burning of her skin from diarrhea.  We discussed adjustments today including increased dose of octreotide to 200 mcg 3 times daily for diarrhea.  Chemotherapy dose adjustments.  Diarrhea was slightly improved following most recent cycle of chemotherapy.  Continue supportive care  12/4/2024 patient reports that Avastin may be the agent causing her diarrhea.  So we will decrease dose by 20% and to see how things going.  Improved diarrhea with dose reduction to Avastin.  Continue previous dosing.   Continue to be improved with continued octreotide 100 mg 3 times daily.  She also has Lomotil and Imodium to utilize for breakthrough symptoms.   4/9/2025 patient had some issues with diarrhea from chemotherapy.  It also caused irritation of her hemorrhoids.  Overall stable.  We recommended continue  octreotide 3 times a day.   4/23/2025 unfortunately, the patient has had a recent flare of her diarrhea.  She is maximizing octreotide, Lomotil and Imodium.  She does note her bowels have slightly improved in the past few days.  Stool specimen today was negative for C. difficile.    5/21/2025 patient reports chronic diarrhea for the last month and a half, with no solid bowel movements and significant gas. Advised to continue using octreotide injection, Imodium and Lomotil as needed. Over-the-counter Metamucil is recommended to be used twice a day to help thicken stools.  A prescription for cholestyramine 4 g up to 3 times daily as needed has been provided.  Chemotherapy has been on hold.    She had a colonoscopy on 06/04/2025 by Dr. Ye, which showed a patent healthy appearing anastomosis in the distal rectum with no other abnormalities. A repeat colonoscopy is recommended in 2 years for surveillance.   6/25/2025 the patient's diarrhea has resolved, with bowel movements now being soft but formed.     3.   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  Low-dose Lovenox discontinued and initiated Xarelto 20 mg daily.  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  Patient held Xarelto for 2 days prior to MediPort removal from the left chest wall and placement of new port in the right chest wall on 12/18/2020.  She resumed Xarelto that evening.  Progressive swelling in the neck, face, upper extremities prompting hospitalization  with CT scan showing thrombosis in the right subclavian vein and SVC.  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  Although she had been off of Xarelto for a few days, clearly Xarelto was not prohibiting progression of her thrombosis after she resumed treatment and there was some evidence to suggest thrombosis had been present at least in the innominate vein on prior scan in September but now appears chronic.  Current acute thrombosis involving SVC and right subclavian.  Patient was admitted and placed on heparin drip  Patient was evaluated by vascular surgery and intervention was not recommended for thrombolysis/thrombectomy.  On 12/22/2020, the patient developed worsening shortness of breath, increasing upper extremity edema consistent with worsening SVC syndrome.  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  On 2/2/2021, we discussed that side effects of Avastin/biosimilar related to thrombosis.  Since this patient already has been on Lovenox, I think the benefits from treatment for her cancer will outweigh that risk of thrombosis, will proceed ahead with Avastin.  I cautioned patient to watch out for signs of worsening thrombosis patient voiced understanding.   5/11/2021 Lovenox dosing will be adjusted due to patient's weight loss.  We discussed she needs 1 mg/kg.  Her syringes are 100 mg syringes she will do 0.9 mL subcu every 12 hours.  8/3/2021 Patient continues to have bruising on abdomen from lovenox injections.  Will need to monitor weight and adjust dosing in future if further weight loss.   Stable condition on 9/28/2021.  Continue Lovenox anticoagulation.    Patient reports no chest pain no dyspnea no leg edema. However she had bruising and also most recently abnormal small abscess for which she actually went to  [facility] on 11/29/2021, had I&D. The wound is healing. No further drainage. Denies fever sweating or chills. After discussion, she will continue Lovenox injection for now.   On 3/23/2022, patient reports good tolerance of Lovenox.  Nevertheless she still has small quantity of pus in the left lower abdomen abscess, she squeezes it every day to prevent it became worse.  She reports no fever sweating chills.  Recommended to start Augmentin 875 mg twice a day for 7 days.  She will continue Lovenox indefinitely.  On 4/12/2022, patient reports resolution of the small abscess at left lower abdominal wall.   Patient had CTA again on 11/2/2024 due to continued pleuritic pain that was negative for PE.  Continue Lovenox 1 mg/kg twice daily without signs or symptoms of bleeding, no signs or symptoms of thrombosis.      4.  Mild anemia.   She also has history of iron deficiency.  Iron studies reveal iron saturation of 10%.  She was started on oral iron but was unable to tolerate due to GI side effects.   Status post Injectafer 2/5/2020 and 2/12/2020   Improved hemoglobin 13.5 on 1/5/2021.  3/16/2020 when hemoglobin now up to 16.2, hematocrit 47.6.  Patient admits that she has started smoking again, and I have encouraged her to quit.  She denies any snoring or sleep apnea diagnosis.  Patient is not taking oral iron.  We will closely monitor.  3/30/2020 hemoglobin 15.7, HCT 47.5.  Patient states that she has cut back significantly on her smoking, although not quit yet.  I have encouraged her to continue working on this.  We will continue to closely monitor.  Laboratory studies 6/22/2021 reported excellent folate > 20 ng/mL, however low normal B12 level 357 pg/mL.    On 7/6/2021, normal hemoglobin 15.8, .9 and HCT 47.2%.  Patient was asked to start oral vitamin B12 at 1000 mcg daily.   Lab study on 12/14/2021 reported excellent ferritin 296 and iron saturation 25% with free iron 104 TIBC 424. Hemoglobin was 15.2 with .2.    Normal hemoglobin 14.6 on 3/15/2022.  Iron study reported normal ferritin 129.5 ng/mL however slightly low iron saturation 11%.  Continue to monitor for now.  On 1/29/2025 hemoglobin is normal at 14.5.  5/21/2025 normal hemoglobin 13.7.  6/25/2025 hemoglobin 13.7.    5.  Peripheral neuropathy secondary to chemotherapy.   This is mild involving bilateral hands and feet as reported on 9/16/2020.   Will avoid chemotherapy with oxaliplatin.  Stable mild neuropathy as of 11/10/2020  Patient reports worsening peripheral neuropathy and cycle #5 chemotherapy on 12/8/2020 with and without irinotecan.   On 1/5/2021, patient reports improvement of peripheral neuropathy, will add irinotecan back to chemotherapy.  Continue to monitor and adjust medication.  Stable.      6.  Depression.    Patient seen by JULIET Davenport on 11/9/2020.  She was started on mirtazapine.  Lexapro was discontinued.  This has definitely improved her mood with the patient feeling overall much better.  She is sleeping better.  Appetite is improved with her actually eating more than she wants to.    Condition is stable.  She plans to continue follow-up with supportive oncology.      7. Malfunction of the portal catheter  Patient had a PowerPort placement on the left upper chest by Dr. Joseph on 8/9/2019.  Patient reports there was no blood drawn from the portal catheter. CT scan of the chest on 7/7/2023 reported occlusion of the SVC around to the Port-A-Cath tubing. I recommend trying activase to see if it helps.  Otherwise, we may have to remove the catheter. Clinically, patient has no signs of SVC syndrome.  On 11/03/2023, the patient reports that her Port-A-Cath was able to be used for a CT scan for the injection of intravenous dye; however, there was no blood return, so we needed to draw from her arm for the blood test. We will continue to keep Port-A-Cath for now.  On 02/09/2024, the patient reports that the port was able to be flushed.  However, no blood was returned. We will continue to flush every 6 weeks.  Port dye study 10/31/2024 showing correct position of port.  Fibrin sheath present.  No issue today.      8.  This patient also has strong family history for malignancy   The patient had appointment with genetic counseling on September 3, 2019.  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    9.  Chest and back pain  Ports this has been ongoing for about 4 weeks.  Started in her right chest/rib cage.  Originally this pain had completely resolved, however returned when she received her next chemotherapy infusion. She felt like the pain worsened after she was unhooked from her 5-FU and that the pain spread to her right scapular/back area.  Since unhook she has continued with pain in her right chest/back that has required her to take pain medicine regularly which is not typical for her.  She has pain with deep inspiration and pain with certain movements.  She also has pain when she pushes on the tissue around her port, though there is no redness or warmth around her port site aside from red rash in the shape of a bandaid and she reports being sensitive to bandaids.  Reviewed all of patients symptoms with Dr. Nguyen who recommended obtaining doppler right upper extremity and PET scan for further evaluation of this severe right sided pain that has been evaluated by CTA without any signs of what could be causing her pain.  Of note she was seen in the ED 11/2/2024, had CT chest performed which was negative for PE.  EKG and troponin looked okay.  She was discharged home.    Healing fracture of the right 2nd rib. The PET scan on 11/12/2024 showed new hypermetabolic activity at a healing fracture on the anterior aspect of the right second rib with SUV 5.7. The patient should avoid activities that may exacerbate the pain and consider pain management options as needed.    12/4/2024 the patient reports improvement in rib pain, with the ability to lay on the  affected side, though still experiencing some soreness and pain upon sneezing or deep breathing.    4/9/2025 She reports her ribs fracture has subsided, with the last one healing about a week ago. She has had 7 rib fractures in total, with 1 reinjury. The fractures occurred from various incidents, including falling down the stairs and landing hard, twisting over to get something off her bedside table, or leaning down to  objects.  Carole Weaver reports a pain score of 0.  Given her pain assessment as noted, treatment options were discussed and the following options were decided upon as a follow-up plan to address the patient's pain: continuation of current treatment plan for pain.      *.Hypokalemia related to diarrhea  On 4/23/2025.  IV potassium replacement provided in the infusion area today.  Begin potassium 10 mEq once daily.  5/21/2025 normal potassium 3.9.  Continue oral potassium.  6/25/2025 normal potassium 3.7.    *.Urinary tract infection  4/22/2025 patient reported urinary symptoms.  We initially prescribed Omnicef, however, the patient had a prescription for Macrobid at home and initiated this.  Tolerating Macrobid with improvement in her symptoms and without worsening diarrhea.  Therefore, we will cancel the Omnicef prescription and instead prescribe an additional 4 days of Macrobid to complete a 5-day course.  5/21/2025 no infection.  6/25/2025 no evidence for urinary tract infection.      *. Right arm swelling.  Today 6/25/2025 nursing staff reports that the port is not returning blood, and there is some swelling in the right hand. The patient experiences numbness and pain in the right arm after activities such as holding a book, writing, coloring, or driving. A Doppler study of the right arm will be conducted to rule out any potential issues, even though the patient is on Lovenox twice a day and theoretically should not have blood clots.      PLAN:    Stat right arm venous Doppler study for  evaluation of DVT. (Addendum: Normal venous Doppler study, no DVT.)   Proceed with palliative chemotherapy cycle 16, with further 10% dose reduction of 5-FU and bevacizumab full dose.  Patient returned 2 days to discontinue 5-FU.  Continue cholestyramine 4 g up to 3 times a day as needed for diarrhea.  Continue octreotide 100 mcg every 8 hours for chemotherapy-induced diarrhea, along with Imodium and Lomotil as needed.    Continue potassium 10 mEq once daily  Continue Lovenox subcutaneously every 12 hours for anticoagulation.  Continue Magic barrier cream as needed.   Continue Norco 5/325 as needed for pain.  1 to 2 tablets.  The patient was encouraged to utilize this for her musculoskeletal pain following her fall  Continue Protonix 40 mg daily.   Continue Gas-X.   Repeated chemotherapy every 2 weeks.  See APRN in 2 weeks, 4 weeks and 6 weeks with labs and chemotherapy.   See me in 8 weeks with routine labs and chemo.   see NP 10 wks, chemo, add Guardant reveal in 10 wks.   get CT for C/A/P in 11 wks, iv contrast but no oral contrast.   see me 12 weeks for reevaluation prior to palliative chemotherapy.       I spent 58 minutes caring for Carole on this date of service. This time includes time spent by me in the following activities: preparing for the visit, reviewing tests, obtaining and/or reviewing a separately obtained history, performing a medically appropriate examination and/or evaluation, counseling and educating the patient/family/caregiver, ordering medications, tests, or procedures, referring and communicating with other health care professionals, documenting information in the medical record, independently interpreting results and communicating that information with the patient/family/caregiver and care coordination     Dong Nguyen MD PhD  06/25/2025        CC:  Deepika Vela III NP-MD Alverto Rahman MD

## 2025-06-25 NOTE — PROGRESS NOTES
"OUTPATIENT ONCOLOGY NUTRITION     Patient Name: Carole Weaver  YOB: 1980  MRN: [id number]  Assessment Date: 6/25/2025    COMMENTS: Follow up for pt with metastatic rectal cancer, hx colostomy with reversal (has no rectal vault).  Pt had a colonoscopy on 6/4 and had a break from her chemo to have this done. She is receiving her #16 of FOLFOX today.  Labs/Meds reviewed. Weight stable since 5/2 at 176lb.     Met with pt today in the infusion center. She reports her appetite is fine and her diarrhea is gone, stools are just \"soft\".  She has Enterade at home in case she needs it, suggested she drink it bid if diarrhea starts. Also encouraged good nutrition.    Will follow throughout treatment course and be available as needed. Pt very appreciative of visit.        Reason for Assessment Follow up     Diagnosis/Problem   Metastatic Rectal Cancer   Treatment Plan Chemotherapy  FOLFOX   Frequency Q 2 weeks   Goal of cancer treatment Disease control   Comments:          Encounter Information        Nutrition/Diet History:  Regular diet, doesn't eat much protein, mostly carbs   Oral Nutrition Supplements:    Factors/Symptoms Affecting Intake: Diarrhea Was really bad with last round of chemo   Comments: Picky eater     Medical/Surgical History Past Medical History:   Diagnosis Date    Abdominopelvic abscess 08/12/2020    ADMITTED TO [facility]    Abnormal Pap smear of cervix 02/02/1998    JULIUS I    Abscess of abdominal wall 11/28/2012    SEEN AT [facility] ER    Anemia in pregnancy     Anxiety     Bilateral epiphora 04/2020    Bilateral hand swelling 05/02/2020    SEEN AT [facility] ER    Cervical lymphadenitis 06/06/2012    SEEN AT [facility] ER    Chemotherapy induced diarrhea 01/2021    Chemotherapy induced neutropenia 04/2020    Chemotherapy-induced nausea 02/2020    Chemotherapy-induced thrombocytopenia 05/2020    Chronic anticoagulation     Chronic diarrhea     Colon polyps     FOLLOWED BY DR. 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 ALEXANDRU HUNT    History of gestational diabetes     History of pre-eclampsia 1998    History of pre-eclampsia     History of radiation therapy 2019    FOLLOWED BY DR. 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    Perirectal abscess 08/12/2020    Perirectal abscess 08/12/2020    Added automatically from request for surgery [id number]      Pulmonary embolism without acute cor pulmonale 09/20/2019    X 3; ADMITTED TO [facility]    Pulmonary nodule, right 05/2020    Rectal cancer 07/08/2019    STAGE IIA, INVASIVE MODERATELY DIFFERENTIATED  ADENOCARCINOMA, CHEMO AND XRT FINISHED 2019    Right shoulder pain 2020    SEEN AT [facility] ER    Right ureteral stone 10/01/2019    SEEN AT [facility] ER    Right ureteral stone 10/01/2019    SEEN AT [facility] ER      SAB (spontaneous ) 1996     A2-1 INDUCED    Sciatica     Sepsis due to cellulitis 2002    LEFT GREAT TOE, ADMITTED TO [facility]    Tachycardia 2020    Thrombosis of superior vena cava 2020    AND BRACHIOCEPHALIC VEIN, ADMITTED TO [facility]    Urinary urgency 2020       Past Surgical History:   Procedure Laterality Date    ABDOMINAL SURGERY      CHOLECYSTECTOMY N/A 10/10/2003    LAPAROSCOPIC WITH CHOLANGIOGRAM, DR. JAMEY TALAVERA AT [facility]    COLON RESECTION N/A 2019    Procedure: laparoscopic low anterior colon resection with mobilization of splenic flexure and diverting loop ileostomy: ERAS;  Surgeon: Padmaja Esparza MD;  Location: [facility];  Service: General    COLON RESECTION N/A 2020    Procedure: LOW ANTERIOR COLON RESECTION, COLOANAL ANASTOMOSIS, MOBILIZATION SPLENIC FLEXURE;  Surgeon: Padmaja Esparza MD;  Location: [facility] OR;  Service: General;  Laterality: N/A;    COLONOSCOPY N/A 07/15/2020    PATENT ANASTAMOSIS IN MID RECTUM, RESCOPE IN 1 YR, DR. PADMAJA ESPARZA AT [facility]    COLONOSCOPY N/A 2022    DIFFUSE AREA OF MODERATELY FRIABLE MUCOSA IN ENTIRE COLON , DIVERSION COLITIS, PATENT AND HEALTHY ANASTAMOSIS, DR. PADMAJA ESPARZA AT [facility]    COLONOSCOPY N/A 2023    6 MM SESSILE SERRATED ADENOMA POLYP IN DESCENDING, PATENT ANASTAMOSIS IN DISTAL RECTUM, RESCOPE IN 2 YRS, DR. PADMAJA ESPARZA AT [facility]    COLONOSCOPY N/A 2025    PATENT AND HEALTHY ANASTAMOSIS IN DISTAL RECTUM, RESCOPE IN 2 YRS, DR. PADMAJA ESPARZA AT [facility]    COLONOSCOPY W/ POLYPECTOMY N/A 2019    15 MM TUBULOVILLOUS ADENOMA POLYP IN HEPATIC FLEXURE, 20 MMTUBULOVILLOUS ADENOMA WITH HIGH GRADE DYSPLASIA IN RECTOSIGMOID, 4 CM MASS IN MID RECTUM, PATH: INVASIVE MODERATELY DIFFERENTIATED  ADENOCARCINOMA, DR. JENNIFER LI AT [facility]    DILATATION AND EVACUATION N/A 2009    ENDOSCOPY N/A 07/08/2019    LA GRADE A ESOPHAGITIS, GASTRITIS, ALL BIOPSIES BENIGN, DR. JENNIFER LI AT [facility]    ILEOSTOMY CLOSURE N/A 11/14/2022    Procedure: ILEOSTOMY TAKEDOWN;  Surgeon: Geronimo Ye MD;  Location: [facility] OR;  Service: General;  Laterality: N/A;    INCISION AND DRAINAGE PERIRECTAL ABSCESS N/A 08/14/2020    Procedure: INCISION AND DRAINAGE OF retrorectal dehiscence abcess with drain placement and irrigation;  Surgeon: Geronimo Ye MD;  Location: [facility] OR;  Service: General;  Laterality: N/A;    PAP SMEAR N/A 01/24/2014    SIGMOIDOSCOPY N/A 07/24/2019    INFILTRATIVE PARTIALLY OBSTRUCTING LARGE RECTAL CANCER, AREA TATOOED, DR. GERONIMO YE AT [facility]    SIGMOIDOSCOPY N/A 11/23/2019    AN ULCERATED PARTIALLY OBSTRUCTING MASS IN MID RECTUM, AREA TATTOOED, DR. GERONIMO YE AT [facility]    SIGMOIDOSCOPY N/A 07/22/2021    PATENT ANASTAMOSIS IN DISTAL RECTUM, RESCOPE IN 1 YR, DR. GERONIMO YE AT [facility]    SIGMOIDOSCOPY N/A 09/11/2024    PATCHY INFLAMMATION AND EDEMA IN DESCENDING, AREA BIOPSIED AND WAS BENIGN, PATENT AND HEALTHY ANASTAMOSIS, HEMORRHOIDS,RESCOPE(CY) 12/2025, DR. GERONIMO YE AT [facility]    TRANSRECTAL ULTRASOUND N/A 07/24/2019    Procedure: ULTRASOUND TRANSRECTAL;  Surgeon: Geronimo Ye MD;  Location: [facility] ENDOSCOPY;  Service: General    URETEROSCOPY LASER LITHOTRIPSY WITH STENT INSERTION Right 10/30/2019    DR. ESTUARDO BEASLEY AT [place]    VAGINAL DELIVERY N/A 09/18/1998    VENOUS ACCESS DEVICE (PORT) INSERTION Left 08/09/2019    Procedure: INSERTION VENOUS ACCESS DEVICE;  Surgeon: Sj Joseph MD;  Location: [facility] OR [facility];  Service: General    VENOUS ACCESS DEVICE (PORT) INSERTION N/A 12/18/2020    Procedure: INSERTION VENOUS ACCESS DEVICE right side, removal venous access device left side;  Surgeon: Sj Joseph MD;  Location: [facility] OR [facility];  Service:  "General;  Laterality: N/A;    WISDOM TOOTH EXTRACTION Bilateral 1993               Anthropometrics        Current Height Ht Readings from Last 1 Encounters:   06/25/25 167.5 cm (65.95\")      Current Weight Wt Readings from Last 1 Encounters:   06/25/25 80 kg (176 lb 6.4 oz)      BMI  27.3   Usual Body Weight (UBW) 200lb 6 months ago   Weight Change/Trend Stable x 2 months   Weight History Wt Readings from Last 30 Encounters:   06/25/25 0847 80 kg (176 lb 6.4 oz)   05/21/25 0829 80.2 kg (176 lb 14.4 oz)   05/02/25 1016 79.8 kg (176 lb)   04/23/25 1046 76.8 kg (169 lb 4.8 oz)   04/09/25 1139 81.3 kg (179 lb 4.8 oz)   03/21/25 1048 81.7 kg (180 lb 3.2 oz)   03/19/25 0824 81.6 kg (180 lb)   03/05/25 0851 82.1 kg (181 lb)   02/13/25 1345 80.4 kg (177 lb 3.2 oz)   01/29/25 1131 82.8 kg (182 lb 9.6 oz)   01/15/25 1043 86 kg (189 lb 8 oz)   12/31/24 0927 85.8 kg (189 lb 3.2 oz)   12/18/24 0910 85.4 kg (188 lb 3.2 oz)   12/10/24 1148 85 kg (187 lb 8 oz)   12/04/24 1017 85.2 kg (187 lb 12.8 oz)   11/13/24 0919 86.7 kg (191 lb 3.2 oz)   11/08/24 1345 87 kg (191 lb 11.2 oz)   10/30/24 0926 86.5 kg (190 lb 9.6 oz)   10/22/24 0518 87.1 kg (192 lb)   10/16/24 0934 89 kg (196 lb 1.6 oz)   10/02/24 1019 87 kg (191 lb 14.4 oz)   09/18/24 0830 87.1 kg (192 lb)   09/11/24 1118 88 kg (194 lb)   09/04/24 0856 90.8 kg (200 lb 3.2 oz)   08/30/24 1203 89.5 kg (197 lb 4.8 oz)   08/29/24 1230 87.7 kg (193 lb 6.4 oz)   08/28/24 1429 87 kg (191 lb 14.4 oz)   08/21/24 0840 89.8 kg (198 lb)   08/16/24 1529 90.2 kg (198 lb 12.8 oz)   08/14/24 1253 92.5 kg (204 lb)          Medications           Current medications: Cyanocobalamin, HYDROcodone-acetaminophen, Hydrocortisone (Perianal), Loperamide HCl, Loratadine, Syringe/Needle (Disp), clonazePAM, dicyclomine, diphenoxylate-atropine, enoxaparin sodium, escitalopram, famotidine, hydrocortisone, metoprolol succinate XL, nystatin, nystatin-zinc oxide-hydrocortisone-bacitracin, octreotide, ondansetron, " "pantoprazole, and rosuvastatin                 Tests/Procedures        Tests/Procedures Chemotherapy     Labs       Pertinent Labs    Results from last 7 days   Lab Units 06/25/25  0837   SODIUM mmol/L 139   POTASSIUM mmol/L 3.7   CHLORIDE mmol/L 105   CO2 mmol/L 23.6   BUN mg/dL 7.3   CREATININE mg/dL 0.57   CALCIUM mg/dL 8.9   BILIRUBIN mg/dL 0.2   ALK PHOS U/L 95   ALT (SGPT) U/L 16   AST (SGOT) U/L 18   GLUCOSE mg/dL 101*     Results from last 7 days   Lab Units 06/25/25  0837   HEMOGLOBIN g/dL 13.7   HEMATOCRIT % 42.6   WBC 10*3/mm3 4.42   ALBUMIN g/dL 3.9     Results from last 7 days   Lab Units 06/25/25  0837   PLATELETS 10*3/mm3 171     COVID19   Date Value Ref Range Status   12/22/2020 Not Detected Not Detected - Ref. Range Final     No results found for: \"HGBA1C\"       Physical Findings        Physical Appearance alert, oriented     Edema  no edema   Gastrointestinal Other:, stool frequency   Tubes/Lines/Drains Implantable Port   Oral/Mouth Cavity WNL   Skin Intact       Estimated/Assessed Needs        Energy Requirements    Height for Calculation      Weight for Calculation 82 kg   Method for Estimation  25 kcal/kg   EST Needs (kcal/day) 2050 kcals per day       Protein Requirements    Weight for Calculation 82 kg   EST Protein Needs (g/kg) 1.2 gm/kg   EST Daily Needs (g/day) 98 gms per day       Fluid Requirements     Method for Estimation 1 mL/kcal    Estimated Needs (mL/day) 2000 mLs per day         NUTRITION INTERVENTION / PLAN OF CARE      Intervention Goal(s) Maintain nutrition status, Reduce/improve symptoms, Provide information, Meet estimated needs, Disease management/therapy, Tolerate PO , Maintain intake, and Maintain weight         RD Intervention/Action Encouraged intake, Follow Tx progress, Recommended/ordered         Recommendations:       PO Diet Consume calorie and protein dense soluble fiber healthy foods, 6 small meals      Supplements Boost or Ensure if needed      Snacks       Other " Enterade bid when having diarrhea   New Prescription Ordered? No, recommend   --      Monitor/Evaluation Follow up as needed   Education Education provided, Will provide eduction as needed   --    Electronically signed by:  Kelley Gliman RD  06/25/25 11:58 EDT

## 2025-06-25 NOTE — NURSING NOTE
Pt here for C16D1 Avastin/Leucovorin+5FU tx. Pt reporting some numbness to right arm with trace swelling. Pt's port did not give blood back when originally accessed - Cathflow given per protocol. Per Dr. Nguyen, STAT doppler ordered to be done prior to treatment.    Doppler scheduled and completed. No thrombosis noted.     Per MD, okay to treat today.

## 2025-06-27 ENCOUNTER — INFUSION (OUTPATIENT)
Dept: ONCOLOGY | Facility: HOSPITAL | Age: 45
End: 2025-06-27
Payer: COMMERCIAL

## 2025-06-27 DIAGNOSIS — G89.3 CANCER ASSOCIATED PAIN: ICD-10-CM

## 2025-06-27 DIAGNOSIS — C78.00 MALIGNANT NEOPLASM METASTATIC TO LUNG, UNSPECIFIED LATERALITY: ICD-10-CM

## 2025-06-27 DIAGNOSIS — Z45.2 ENCOUNTER FOR FITTING AND ADJUSTMENT OF VASCULAR CATHETER: ICD-10-CM

## 2025-06-27 DIAGNOSIS — C20 ADENOCARCINOMA OF RECTUM: ICD-10-CM

## 2025-06-27 DIAGNOSIS — Z79.899 ENCOUNTER FOR LONG-TERM (CURRENT) USE OF HIGH-RISK MEDICATION: Primary | ICD-10-CM

## 2025-06-27 PROCEDURE — 96523 IRRIG DRUG DELIVERY DEVICE: CPT

## 2025-06-27 PROCEDURE — 25010000002 HEPARIN LOCK FLUSH PER 10 UNITS: Performed by: INTERNAL MEDICINE

## 2025-06-27 RX ORDER — HYDROCODONE BITARTRATE AND ACETAMINOPHEN 5; 325 MG/1; MG/1
2 TABLET ORAL EVERY 6 HOURS PRN
Qty: 90 TABLET | Refills: 0 | Status: SHIPPED | OUTPATIENT
Start: 2025-06-27

## 2025-06-27 RX ORDER — HEPARIN SODIUM (PORCINE) LOCK FLUSH IV SOLN 100 UNIT/ML 100 UNIT/ML
500 SOLUTION INTRAVENOUS AS NEEDED
Status: DISCONTINUED | OUTPATIENT
Start: 2025-06-27 | End: 2025-06-27 | Stop reason: HOSPADM

## 2025-06-27 RX ORDER — SODIUM CHLORIDE 0.9 % (FLUSH) 0.9 %
10 SYRINGE (ML) INJECTION AS NEEDED
Status: DISCONTINUED | OUTPATIENT
Start: 2025-06-27 | End: 2025-06-27 | Stop reason: HOSPADM

## 2025-06-27 RX ADMIN — Medication 500 UNITS: at 11:47

## 2025-06-27 RX ADMIN — Medication 10 ML: at 11:45

## 2025-07-02 RX ORDER — ENOXAPARIN SODIUM 100 MG/ML
1 INJECTION SUBCUTANEOUS EVERY 12 HOURS SCHEDULED
Qty: 60 ML | Refills: 2 | Status: SHIPPED | OUTPATIENT
Start: 2025-07-02

## 2025-07-04 DIAGNOSIS — F41.9 ANXIETY: ICD-10-CM

## 2025-07-07 RX ORDER — CLONAZEPAM 1 MG/1
TABLET ORAL
Qty: 45 TABLET | Refills: 0 | Status: SHIPPED | OUTPATIENT
Start: 2025-07-07

## 2025-07-07 NOTE — TELEPHONE ENCOUNTER
Rx Refill Note  Requested Prescriptions     Pending Prescriptions Disp Refills    clonazePAM (KlonoPIN) 1 MG tablet [Pharmacy Med Name: CLONAZEPAM 1 MG TABLET] 45 tablet      Sig: TAKE 1 TABLET AS NEEDED DAILY FOR ANXIETY. MAY TAKE ONE ADDITIONAL AS NEEDED FOR SEVERE ANXIETY.      Last office visit with prescribing clinician: 3/21/2025  Next office visit with prescribing clinician: Visit date not found         Erika Thakur MA  07/07/25, 08:21 EDT

## 2025-07-14 RX ORDER — ROSUVASTATIN CALCIUM 5 MG/1
5 TABLET, COATED ORAL DAILY
Qty: 90 TABLET | Refills: 0 | Status: ON HOLD | OUTPATIENT
Start: 2025-07-14

## 2025-07-16 DIAGNOSIS — G89.3 CANCER ASSOCIATED PAIN: ICD-10-CM

## 2025-07-16 RX ORDER — HYDROCODONE BITARTRATE AND ACETAMINOPHEN 5; 325 MG/1; MG/1
2 TABLET ORAL EVERY 6 HOURS PRN
Qty: 90 TABLET | Refills: 0 | Status: CANCELLED | OUTPATIENT
Start: 2025-07-16

## 2025-07-17 ENCOUNTER — TELEPHONE (OUTPATIENT)
Dept: SURGERY | Facility: CLINIC | Age: 45
End: 2025-07-17
Payer: COMMERCIAL

## 2025-07-17 DIAGNOSIS — G89.3 CANCER ASSOCIATED PAIN: ICD-10-CM

## 2025-07-17 RX ORDER — HYDROCODONE BITARTRATE AND ACETAMINOPHEN 5; 325 MG/1; MG/1
2 TABLET ORAL EVERY 6 HOURS PRN
Qty: 90 TABLET | Refills: 0 | Status: ON HOLD | OUTPATIENT
Start: 2025-07-17

## 2025-07-17 NOTE — TELEPHONE ENCOUNTER
Pt is coming in tomorrow to discuss permanent ostomy/colectomy surgery. Pt says she can no longer do chemo treatments because of painful diarrhea. She has postponed chemo tx until she can have surgery. Pt would like to get scheduled ASAP. She says her last chemo was 3 weeks ago and she has had diarrhea ever since. She says she has a very small amount of mushy stool that comes out of rectum and it burns as soon as the stool drops into the rectum. She knows she may have to have an exam tomorrow but she is in such constant pain she does not know how this can be achieved.She is using HC cream inside of rectum, magic butt paste on outside of rectum. She is taking imodium, lomotil and  octreotide injections. She says she feels like there is a knot on the inside of the rectum. She is doing sitz baths, unable to tolerate Lidocaine cream because it increases the burning and pain. She says she is not eating much to try to not have the diarrhea, she is eating 2 pieces of toast a day. She describes the Pain as excruciating constant, and unbearable. She is afebrile.    Pt wants to know if there is any way we can expedite surgery scheduling.Pt stated she cannot live like this anymore. Wants to know if you have any other advice on what to do.    Aside from BRAT diet, would a probiotic be useful? Tx for proctitis? Would Pepto Bismol help?

## 2025-07-18 ENCOUNTER — TELEPHONE (OUTPATIENT)
Dept: ONCOLOGY | Facility: CLINIC | Age: 45
End: 2025-07-18
Payer: COMMERCIAL

## 2025-07-18 ENCOUNTER — OFFICE VISIT (OUTPATIENT)
Dept: SURGERY | Facility: CLINIC | Age: 45
End: 2025-07-18
Payer: COMMERCIAL

## 2025-07-18 VITALS
SYSTOLIC BLOOD PRESSURE: 120 MMHG | HEIGHT: 66 IN | HEART RATE: 89 BPM | WEIGHT: 176 LBS | OXYGEN SATURATION: 98 % | DIASTOLIC BLOOD PRESSURE: 70 MMHG | BODY MASS INDEX: 28.28 KG/M2

## 2025-07-18 DIAGNOSIS — F33.9 MONOPOLAR DEPRESSION: ICD-10-CM

## 2025-07-18 DIAGNOSIS — R15.9 FULL INCONTINENCE OF FECES: ICD-10-CM

## 2025-07-18 DIAGNOSIS — Z85.048 HISTORY OF RECTAL CANCER: Primary | ICD-10-CM

## 2025-07-18 PROCEDURE — 99214 OFFICE O/P EST MOD 30 MIN: CPT | Performed by: COLON & RECTAL SURGERY

## 2025-07-18 RX ORDER — ACETAMINOPHEN 500 MG
1000 TABLET ORAL ONCE
Status: CANCELLED | OUTPATIENT
Start: 2025-07-18 | End: 2025-07-18

## 2025-07-18 RX ORDER — ESCITALOPRAM OXALATE 10 MG/1
10 TABLET ORAL DAILY
Qty: 90 TABLET | Refills: 1 | Status: ON HOLD | OUTPATIENT
Start: 2025-07-18

## 2025-07-18 RX ORDER — OCTREOTIDE ACETATE 100 UG/ML
INJECTION, SOLUTION INTRAVENOUS; SUBCUTANEOUS
Qty: 90 ML | Refills: 2 | Status: ON HOLD | OUTPATIENT
Start: 2025-07-18

## 2025-07-18 RX ORDER — GABAPENTIN 100 MG/1
600 CAPSULE ORAL 3 TIMES DAILY
Status: CANCELLED | OUTPATIENT
Start: 2025-07-18

## 2025-07-18 RX ORDER — CHLORHEXIDINE GLUCONATE 40 MG/ML
1 SOLUTION TOPICAL 2 TIMES DAILY
Qty: 236 ML | Refills: 0 | Status: CANCELLED | OUTPATIENT
Start: 2025-07-18

## 2025-07-18 RX ORDER — CELECOXIB 100 MG/1
200 CAPSULE ORAL ONCE
Status: CANCELLED | OUTPATIENT
Start: 2025-07-18 | End: 2025-07-18

## 2025-07-18 RX ORDER — SCOPOLAMINE 1 MG/3D
1 PATCH, EXTENDED RELEASE TRANSDERMAL CONTINUOUS
Status: CANCELLED | OUTPATIENT
Start: 2025-07-18 | End: 2025-07-21

## 2025-07-18 RX ORDER — ALVIMOPAN 12 MG/1
12 CAPSULE ORAL ONCE
Status: CANCELLED | OUTPATIENT
Start: 2025-07-18 | End: 2025-07-18

## 2025-07-18 RX ORDER — METRONIDAZOLE 500 MG/100ML
500 INJECTION, SOLUTION INTRAVENOUS ONCE
Status: CANCELLED | OUTPATIENT
Start: 2025-07-18 | End: 2025-07-18

## 2025-07-18 NOTE — H&P (VIEW-ONLY)
Carole Weaver is a 44 y.o. female in for follow up of History of rectal cancer    Full incontinence of feces    History of Present Illness  The patient is a 44-year-old female with a history of rectal cancer, currently undergoing chemotherapy for metastatic disease.    She has been experiencing frequent bowel movements, necessitating the use of Pepto-Bismol every 15 minutes. This over-the-counter medication has provided some relief. However, she has had to resort to sleeping in the bathtub for the past two nights to achieve even two hours of uninterrupted sleep. She describes an internal burning sensation that intensifies and spreads, causing her to rush to the bathroom. She has taken photographs of her stool, which she describes as a small, mushy blob that brings her to tears. She occasionally notices blood in her stool. She has lost weight and developed a fistula, which was treated with a wound VAC and home health care. She does not experience stomach aches. She is considering a colostomy and is curious about the recovery time post-surgery. She is also interested in learning about other patients' experiences with the operation. She is currently applying hydrocortisone cream 5% and magic butt paste to her rectal area. She has been taking two doses of Pepto-Bismol every hour until her symptoms subside, totaling five doses last night.    MEDICATIONS  Current: octreotide, Imodium, Lomotil, Pepto-Bismol, hydrocortisone cream, magic butt paste     Past Medical History:   Diagnosis Date    Abdominopelvic abscess 08/12/2020    ADMITTED TO [facility]    Abnormal Pap smear of cervix 02/02/1998    JULIUS I    Abscess of abdominal wall 11/28/2012    SEEN AT [facility] ER    Anemia in pregnancy     Anxiety     Bilateral epiphora 04/2020    Bilateral hand swelling 05/02/2020    SEEN AT [facility] ER    Cervical lymphadenitis 06/06/2012    SEEN AT [facility] ER    Chemotherapy induced diarrhea 01/2021    Chemotherapy induced neutropenia 04/2020     Chemotherapy-induced nausea 02/2020    Chemotherapy-induced thrombocytopenia 05/2020    Chronic anticoagulation     Chronic diarrhea     Colon polyps     FOLLOWED BY DR. 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 ALEXANDRU HUNT    History of gestational diabetes     History of pre-eclampsia 1998    History of pre-eclampsia     History of radiation therapy 2019    FOLLOWED BY DR. JAVON LEWIS    History of TB skin testing 01/17/2009    TB Skin Test    History of TB skin testing 01/17/2009    TB Skin Test    History of transfusion 2019    12/2019    HPV in female 1998    Hypokalemia 09/2019    Hypomagnesemia 09/2019    Hyponatremia 06/2021    IBS (irritable bowel syndrome)     Ileostomy present 04/25/2020    Take down on 11/3/2022    Infected insect bite of neck 05/27/2016    SEEN AT [facility]    Kidney stone on right side 10/07/2019    Kidney stones 08/09/2007    SEEN AT [facility] ER    Low anterior resection syndrome 12/2022    FOLLOWED BY DR. 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    Perirectal abscess 08/12/2020    Perirectal abscess 08/12/2020    Added automatically from request for surgery [id number]       Pulmonary embolism without acute cor pulmonale 09/20/2019    X 3; ADMITTED TO [facility]    Pulmonary nodule, right 2020    Rectal cancer 2019    STAGE IIA, INVASIVE MODERATELY DIFFERENTIATED ADENOCARCINOMA, CHEMO AND XRT FINISHED 2019    Right shoulder pain 2020    SEEN AT [facility] ER    Right ureteral stone 10/01/2019    SEEN AT [facility] ER    Right ureteral stone 10/01/2019    SEEN AT [facility] ER      SAB (spontaneous ) 1996     A2-1 INDUCED    Sciatica     Sepsis due to cellulitis 2002    LEFT GREAT TOE, ADMITTED TO [facility]    Tachycardia 2020    Thrombosis of superior vena cava 2020    AND BRACHIOCEPHALIC VEIN, ADMITTED TO [facility]    Urinary urgency 2020     Past Surgical History:   Procedure Laterality Date    ABDOMINAL SURGERY      CHOLECYSTECTOMY N/A 10/10/2003    LAPAROSCOPIC WITH CHOLANGIOGRAM, DR. JAMEY TALAVERA AT [facility]    COLON RESECTION N/A 2019    Procedure: laparoscopic low anterior colon resection with mobilization of splenic flexure and diverting loop ileostomy: ERAS;  Surgeon: Padmaja Esparza MD;  Location: [facility] OR;  Service: General    COLON RESECTION N/A 2020    Procedure: LOW ANTERIOR COLON RESECTION, COLOANAL ANASTOMOSIS, MOBILIZATION SPLENIC FLEXURE;  Surgeon: Padmaja Esparza MD;  Location: [facility] MAIN OR;  Service: General;  Laterality: N/A;    COLONOSCOPY N/A 07/15/2020    PATENT ANASTAMOSIS IN MID RECTUM, RESCOPE IN 1 YR, DR. PADMAJA ESPARZA AT [facility]    COLONOSCOPY N/A 2022    DIFFUSE AREA OF MODERATELY FRIABLE MUCOSA IN ENTIRE COLON , DIVERSION COLITIS, PATENT AND HEALTHY ANASTAMOSIS, DR. PADMAJA ESPARZA AT [facility]    COLONOSCOPY N/A 2023    6 MM SESSILE SERRATED ADENOMA POLYP IN DESCENDING, PATENT ANASTAMOSIS IN DISTAL RECTUM, RESCOPE IN 2 YRS, DR. PADMAJA ESPARZA AT [facility]    COLONOSCOPY N/A 2025    PATENT AND HEALTHY ANASTAMOSIS IN DISTAL RECTUM, RESCOPE IN 2 YRS, DR. PADMAJA ESPARZA AT [facility]    COLONOSCOPY W/ POLYPECTOMY N/A 2019    15  MM TUBULOVILLOUS ADENOMA POLYP IN HEPATIC FLEXURE, 20 MMTUBULOVILLOUS ADENOMA WITH HIGH GRADE DYSPLASIA IN RECTOSIGMOID, 4 CM MASS IN MID RECTUM, PATH: INVASIVE MODERATELY DIFFERENTIATED ADENOCARCINOMA, DR. JENNIFER LI AT [facility]    DILATATION AND EVACUATION N/A 2009    ENDOSCOPY N/A 07/08/2019    LA GRADE A ESOPHAGITIS, GASTRITIS, ALL BIOPSIES BENIGN, DR. JENNIFER LI AT [facility]    ILEOSTOMY CLOSURE N/A 11/14/2022    Procedure: ILEOSTOMY TAKEDOWN;  Surgeon: Geronimo Ye MD;  Location: [facility] OR;  Service: General;  Laterality: N/A;    INCISION AND DRAINAGE PERIRECTAL ABSCESS N/A 08/14/2020    Procedure: INCISION AND DRAINAGE OF retrorectal dehiscence abcess with drain placement and irrigation;  Surgeon: Geronimo Ye MD;  Location: [facility] OR;  Service: General;  Laterality: N/A;    PAP SMEAR N/A 01/24/2014    SIGMOIDOSCOPY N/A 07/24/2019    INFILTRATIVE PARTIALLY OBSTRUCTING LARGE RECTAL CANCER, AREA TATOOED, DR. GERONIMO YE AT [facility]    SIGMOIDOSCOPY N/A 11/23/2019    AN ULCERATED PARTIALLY OBSTRUCTING MASS IN MID RECTUM, AREA TATTOOED, DR. GERONIMO YE AT [facility]    SIGMOIDOSCOPY N/A 07/22/2021    PATENT ANASTAMOSIS IN DISTAL RECTUM, RESCOPE IN 1 YR, DR. GERONIMO YE AT [facility]    SIGMOIDOSCOPY N/A 09/11/2024    PATCHY INFLAMMATION AND EDEMA IN DESCENDING, AREA BIOPSIED AND WAS BENIGN, PATENT AND HEALTHY ANASTAMOSIS, HEMORRHOIDS,RESCOPE(CY) 12/2025, DR. GERONIMO YE AT [facility]    TRANSRECTAL ULTRASOUND N/A 07/24/2019    Procedure: ULTRASOUND TRANSRECTAL;  Surgeon: Geronimo Ye MD;  Location: [facility] ENDOSCOPY;  Service: General    URETEROSCOPY LASER LITHOTRIPSY WITH STENT INSERTION Right 10/30/2019    DR. ESTUARDO BEASLEY AT [facility]    VAGINAL DELIVERY N/A 09/18/1998    VENOUS ACCESS DEVICE (PORT) INSERTION Left 08/09/2019    Procedure: INSERTION VENOUS ACCESS DEVICE;  Surgeon: Sj Josehp MD;  Location: [facility] OR OSC;  Service: General    VENOUS ACCESS DEVICE (PORT)  "INSERTION N/A 12/18/2020    Procedure: INSERTION VENOUS ACCESS DEVICE right side, removal venous access device left side;  Surgeon: Sj Joseph MD;  Location: [facility] OR [facility];  Service: General;  Laterality: N/A;    WISDOM TOOTH EXTRACTION Bilateral 1993       /70 (BP Location: Left arm, Patient Position: Sitting, Cuff Size: Adult)   Pulse 89   Ht 167.6 cm (66\")   Wt 79.8 kg (176 lb)   LMP  (LMP Unknown)   SpO2 98%   Breastfeeding No   BMI 28.41 kg/m²   Body mass index is 28.41 kg/m².      PE:   Physical Exam    Physical Exam  Constitutional:       General: She is not in acute distress.     Appearance: She is well-developed.   HENT:      Head: Normocephalic and atraumatic.   Abdominal:      General: There is no distension.      Palpations: Abdomen is soft.      Comments: Midline incision scar.   Rlq incision scar  No hernia  Panis of skin   Musculoskeletal:         General: Normal range of motion.   Neurological:      Mental Status: She is alert.   Psychiatric:         Thought Content: Thought content normal.             Assessment & Plan  1. Rectal cancer with metastatic disease.      2. Full incontinence of feces       She has been experiencing significant discomfort and frequent bowel movements, which have been partially managed with over-the-counter Pepto-Bismol. The CAT scan indicates overflow diarrhea due to a full colon and some stenosis from radiation. A diverting colostomy on the left side is recommended to improve her quality of life. The procedure will involve cutting the colon and bringing it up to create an ostomy, which may be done laparoscopically or through an upper midline incision. The potential risks, benefits, and alternatives were discussed. The surgery is scheduled for 07/29/2025. A prescription for baclofen suppository will be provided to help relax the pelvic muscles and alleviate the burning sensation. She is advised to continue using Pepto-Bismol as needed and to apply " Vaseline internally for coating.         Patient or patient representative verbalized consent for the use of Ambient Listening during the visit with  Padmaja Ye MD for chart documentation. 7/21/2025  08:08 EDT

## 2025-07-18 NOTE — TELEPHONE ENCOUNTER
Patient called and informed patient's treatment on HOLD. Patient having surgery on 7/29/25. Dr Nguyen will follow up with patient 6 weeks after surgery. Patient to keep appointment on 7/23/25 for labs APRN and IV fluids.    Patient v/u  
yes

## 2025-07-18 NOTE — TELEPHONE ENCOUNTER
Patient called stating she met with Dr Ольга Ye and patient is scheduled to have colostomy on 7/29/25. Patient called and asking if its okay for patient to HOLD treatment for that long. Explained to patient will speak with Dr Nguyen and will call patient back.    Patient v/u

## 2025-07-18 NOTE — TELEPHONE ENCOUNTER
Rx Refill Note  Requested Prescriptions     Pending Prescriptions Disp Refills    escitalopram (LEXAPRO) 10 MG tablet [Pharmacy Med Name: ESCITALOPRAM 10 MG TABLET] 90 tablet 1     Sig: TAKE 1 TABLET BY MOUTH EVERY DAY      Last office visit with prescribing clinician: 3/21/2025  Next office visit with prescribing clinician: Visit date not found         Erika Thakur MA  07/18/25, 08:09 EDT

## 2025-07-18 NOTE — PROGRESS NOTES
Caorle Weaver is a 44 y.o. female in for follow up of History of rectal cancer    Full incontinence of feces    History of Present Illness  The patient is a 44-year-old female with a history of rectal cancer, currently undergoing chemotherapy for metastatic disease.    She has been experiencing frequent bowel movements, necessitating the use of Pepto-Bismol every 15 minutes. This over-the-counter medication has provided some relief. However, she has had to resort to sleeping in the bathtub for the past two nights to achieve even two hours of uninterrupted sleep. She describes an internal burning sensation that intensifies and spreads, causing her to rush to the bathroom. She has taken photographs of her stool, which she describes as a small, mushy blob that brings her to tears. She occasionally notices blood in her stool. She has lost weight and developed a fistula, which was treated with a wound VAC and home health care. She does not experience stomach aches. She is considering a colostomy and is curious about the recovery time post-surgery. She is also interested in learning about other patients' experiences with the operation. She is currently applying hydrocortisone cream 5% and magic butt paste to her rectal area. She has been taking two doses of Pepto-Bismol every hour until her symptoms subside, totaling five doses last night.    MEDICATIONS  Current: octreotide, Imodium, Lomotil, Pepto-Bismol, hydrocortisone cream, magic butt paste     Past Medical History:   Diagnosis Date    Abdominopelvic abscess 08/12/2020    ADMITTED TO [facility]    Abnormal Pap smear of cervix 02/02/1998    JULIUS I    Abscess of abdominal wall 11/28/2012    SEEN AT [facility] ER    Anemia in pregnancy     Anxiety     Bilateral epiphora 04/2020    Bilateral hand swelling 05/02/2020    SEEN AT [facility] ER    Cervical lymphadenitis 06/06/2012    SEEN AT [facility] ER    Chemotherapy induced diarrhea 01/2021    Chemotherapy induced neutropenia 04/2020     Chemotherapy-induced nausea 02/2020    Chemotherapy-induced thrombocytopenia 05/2020    Chronic anticoagulation     Chronic diarrhea     Colon polyps     FOLLOWED BY DR. 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 ALEXANDRU HUNT    History of gestational diabetes     History of pre-eclampsia 1998    History of pre-eclampsia     History of radiation therapy 2019    FOLLOWED BY DR. JAVON LEWIS    History of TB skin testing 01/17/2009    TB Skin Test    History of TB skin testing 01/17/2009    TB Skin Test    History of transfusion 2019    12/2019    HPV in female 1998    Hypokalemia 09/2019    Hypomagnesemia 09/2019    Hyponatremia 06/2021    IBS (irritable bowel syndrome)     Ileostomy present 04/25/2020    Take down on 11/3/2022    Infected insect bite of neck 05/27/2016    SEEN AT [facility]    Kidney stone on right side 10/07/2019    Kidney stones 08/09/2007    SEEN AT [facility] ER    Low anterior resection syndrome 12/2022    FOLLOWED BY DR. 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    Perirectal abscess 08/12/2020    Perirectal abscess 08/12/2020    Added automatically from request for surgery [id number]       Pulmonary embolism without acute cor pulmonale 09/20/2019    X 3; ADMITTED TO [facility]    Pulmonary nodule, right 2020    Rectal cancer 2019    STAGE IIA, INVASIVE MODERATELY DIFFERENTIATED ADENOCARCINOMA, CHEMO AND XRT FINISHED 2019    Right shoulder pain 2020    SEEN AT [facility] ER    Right ureteral stone 10/01/2019    SEEN AT [facility] ER    Right ureteral stone 10/01/2019    SEEN AT [facility] ER      SAB (spontaneous ) 1996     A2-1 INDUCED    Sciatica     Sepsis due to cellulitis 2002    LEFT GREAT TOE, ADMITTED TO [facility]    Tachycardia 2020    Thrombosis of superior vena cava 2020    AND BRACHIOCEPHALIC VEIN, ADMITTED TO [facility]    Urinary urgency 2020     Past Surgical History:   Procedure Laterality Date    ABDOMINAL SURGERY      CHOLECYSTECTOMY N/A 10/10/2003    LAPAROSCOPIC WITH CHOLANGIOGRAM, DR. JAMEY TALAVERA AT [facility]    COLON RESECTION N/A 2019    Procedure: laparoscopic low anterior colon resection with mobilization of splenic flexure and diverting loop ileostomy: ERAS;  Surgeon: Padmaja Esparza MD;  Location: [facility] OR;  Service: General    COLON RESECTION N/A 2020    Procedure: LOW ANTERIOR COLON RESECTION, COLOANAL ANASTOMOSIS, MOBILIZATION SPLENIC FLEXURE;  Surgeon: Padmaja Esparza MD;  Location: [facility] MAIN OR;  Service: General;  Laterality: N/A;    COLONOSCOPY N/A 07/15/2020    PATENT ANASTAMOSIS IN MID RECTUM, RESCOPE IN 1 YR, DR. PADMAJA ESPARZA AT [facility]    COLONOSCOPY N/A 2022    DIFFUSE AREA OF MODERATELY FRIABLE MUCOSA IN ENTIRE COLON , DIVERSION COLITIS, PATENT AND HEALTHY ANASTAMOSIS, DR. PADMAJA ESPARZA AT [facility]    COLONOSCOPY N/A 2023    6 MM SESSILE SERRATED ADENOMA POLYP IN DESCENDING, PATENT ANASTAMOSIS IN DISTAL RECTUM, RESCOPE IN 2 YRS, DR. PADMAJA ESPARZA AT [facility]    COLONOSCOPY N/A 2025    PATENT AND HEALTHY ANASTAMOSIS IN DISTAL RECTUM, RESCOPE IN 2 YRS, DR. PADMAJA ESPARZA AT [facility]    COLONOSCOPY W/ POLYPECTOMY N/A 2019    15  MM TUBULOVILLOUS ADENOMA POLYP IN HEPATIC FLEXURE, 20 MMTUBULOVILLOUS ADENOMA WITH HIGH GRADE DYSPLASIA IN RECTOSIGMOID, 4 CM MASS IN MID RECTUM, PATH: INVASIVE MODERATELY DIFFERENTIATED ADENOCARCINOMA, DR. JENNIFER LI AT [facility]    DILATATION AND EVACUATION N/A 2009    ENDOSCOPY N/A 07/08/2019    LA GRADE A ESOPHAGITIS, GASTRITIS, ALL BIOPSIES BENIGN, DR. JENNIFER LI AT [facility]    ILEOSTOMY CLOSURE N/A 11/14/2022    Procedure: ILEOSTOMY TAKEDOWN;  Surgeon: Geronimo Ye MD;  Location: [facility] OR;  Service: General;  Laterality: N/A;    INCISION AND DRAINAGE PERIRECTAL ABSCESS N/A 08/14/2020    Procedure: INCISION AND DRAINAGE OF retrorectal dehiscence abcess with drain placement and irrigation;  Surgeon: Geronimo Ye MD;  Location: [facility] OR;  Service: General;  Laterality: N/A;    PAP SMEAR N/A 01/24/2014    SIGMOIDOSCOPY N/A 07/24/2019    INFILTRATIVE PARTIALLY OBSTRUCTING LARGE RECTAL CANCER, AREA TATOOED, DR. GERONIMO YE AT [facility]    SIGMOIDOSCOPY N/A 11/23/2019    AN ULCERATED PARTIALLY OBSTRUCTING MASS IN MID RECTUM, AREA TATTOOED, DR. GERONIMO YE AT [facility]    SIGMOIDOSCOPY N/A 07/22/2021    PATENT ANASTAMOSIS IN DISTAL RECTUM, RESCOPE IN 1 YR, DR. GERONIMO YE AT [facility]    SIGMOIDOSCOPY N/A 09/11/2024    PATCHY INFLAMMATION AND EDEMA IN DESCENDING, AREA BIOPSIED AND WAS BENIGN, PATENT AND HEALTHY ANASTAMOSIS, HEMORRHOIDS,RESCOPE(CY) 12/2025, DR. GERONIMO YE AT [facility]    TRANSRECTAL ULTRASOUND N/A 07/24/2019    Procedure: ULTRASOUND TRANSRECTAL;  Surgeon: Geronimo Ye MD;  Location: [facility] ENDOSCOPY;  Service: General    URETEROSCOPY LASER LITHOTRIPSY WITH STENT INSERTION Right 10/30/2019    DR. ESTUARDO BEASLEY AT [place]    VAGINAL DELIVERY N/A 09/18/1998    VENOUS ACCESS DEVICE (PORT) INSERTION Left 08/09/2019    Procedure: INSERTION VENOUS ACCESS DEVICE;  Surgeon: Sj Joseph MD;  Location: [facility] OR OSC;  Service: General    VENOUS ACCESS DEVICE (PORT)  "INSERTION N/A 12/18/2020    Procedure: INSERTION VENOUS ACCESS DEVICE right side, removal venous access device left side;  Surgeon: Sj Joseph MD;  Location: [facility] OR [facility];  Service: General;  Laterality: N/A;    WISDOM TOOTH EXTRACTION Bilateral 1993       /70 (BP Location: Left arm, Patient Position: Sitting, Cuff Size: Adult)   Pulse 89   Ht 167.6 cm (66\")   Wt 79.8 kg (176 lb)   LMP  (LMP Unknown)   SpO2 98%   Breastfeeding No   BMI 28.41 kg/m²   Body mass index is 28.41 kg/m².      PE:   Physical Exam    Physical Exam  Constitutional:       General: She is not in acute distress.     Appearance: She is well-developed.   HENT:      Head: Normocephalic and atraumatic.   Abdominal:      General: There is no distension.      Palpations: Abdomen is soft.      Comments: Midline incision scar.   Rlq incision scar  No hernia  Panis of skin   Musculoskeletal:         General: Normal range of motion.   Neurological:      Mental Status: She is alert.   Psychiatric:         Thought Content: Thought content normal.             Assessment & Plan  1. Rectal cancer with metastatic disease.      2. Full incontinence of feces       She has been experiencing significant discomfort and frequent bowel movements, which have been partially managed with over-the-counter Pepto-Bismol. The CAT scan indicates overflow diarrhea due to a full colon and some stenosis from radiation. A diverting colostomy on the left side is recommended to improve her quality of life. The procedure will involve cutting the colon and bringing it up to create an ostomy, which may be done laparoscopically or through an upper midline incision. The potential risks, benefits, and alternatives were discussed. The surgery is scheduled for 07/29/2025. A prescription for baclofen suppository will be provided to help relax the pelvic muscles and alleviate the burning sensation. She is advised to continue using Pepto-Bismol as needed and to apply " Vaseline internally for coating.         Patient or patient representative verbalized consent for the use of Ambient Listening during the visit with  Padmaja Ye MD for chart documentation. 7/21/2025  08:08 EDT

## 2025-07-19 ENCOUNTER — HOSPITAL ENCOUNTER (EMERGENCY)
Facility: HOSPITAL | Age: 45
Discharge: HOME | End: 2025-07-20
Attending: EMERGENCY MEDICINE | Admitting: EMERGENCY MEDICINE
Payer: COMMERCIAL

## 2025-07-19 DIAGNOSIS — R19.7 DIARRHEA, UNSPECIFIED TYPE: Primary | ICD-10-CM

## 2025-07-19 DIAGNOSIS — C20 ADENOCARCINOMA OF RECTUM: ICD-10-CM

## 2025-07-19 PROCEDURE — 99283 EMERGENCY DEPT VISIT LOW MDM: CPT

## 2025-07-20 VITALS
RESPIRATION RATE: 18 BRPM | BODY MASS INDEX: 28.28 KG/M2 | SYSTOLIC BLOOD PRESSURE: 124 MMHG | WEIGHT: 176 LBS | OXYGEN SATURATION: 98 % | HEIGHT: 66 IN | HEART RATE: 78 BPM | TEMPERATURE: 98.1 F | DIASTOLIC BLOOD PRESSURE: 95 MMHG

## 2025-07-20 LAB
ALBUMIN SERPL-MCNC: 4 G/DL (ref 3.5–5.2)
ALBUMIN/GLOB SERPL: 1.4 G/DL
ALP SERPL-CCNC: 90 U/L (ref 39–117)
ALT SERPL W P-5'-P-CCNC: 13 U/L (ref 1–33)
ANION GAP SERPL CALCULATED.3IONS-SCNC: 11 MMOL/L (ref 5–15)
AST SERPL-CCNC: 21 U/L (ref 1–32)
BACTERIA UR QL AUTO: ABNORMAL /HPF
BASOPHILS # BLD AUTO: 0.01 10*3/MM3 (ref 0–0.2)
BASOPHILS NFR BLD AUTO: 0.2 % (ref 0–1.5)
BILIRUB SERPL-MCNC: 0.2 MG/DL (ref 0–1.2)
BILIRUB UR QL STRIP: NEGATIVE
BUN SERPL-MCNC: 8 MG/DL (ref 6–20)
BUN/CREAT SERPL: 12.1 (ref 7–25)
CALCIUM SPEC-SCNC: 9 MG/DL (ref 8.6–10.5)
CHLORIDE SERPL-SCNC: 107 MMOL/L (ref 98–107)
CLARITY UR: CLEAR
CO2 SERPL-SCNC: 24 MMOL/L (ref 22–29)
COD CRY URNS QL: PRESENT /HPF
COLOR UR: ABNORMAL
CREAT SERPL-MCNC: 0.66 MG/DL (ref 0.57–1)
DEPRECATED RDW RBC AUTO: 45.2 FL (ref 37–54)
EGFRCR SERPLBLD CKD-EPI 2021: 110.4 ML/MIN/1.73
EOSINOPHIL # BLD AUTO: 0.11 10*3/MM3 (ref 0–0.4)
EOSINOPHIL NFR BLD AUTO: 2.2 % (ref 0.3–6.2)
ERYTHROCYTE [DISTWIDTH] IN BLOOD BY AUTOMATED COUNT: 12.7 % (ref 12.3–15.4)
GLOBULIN UR ELPH-MCNC: 2.9 GM/DL
GLUCOSE SERPL-MCNC: 71 MG/DL (ref 65–99)
GLUCOSE UR STRIP-MCNC: NEGATIVE MG/DL
HCT VFR BLD AUTO: 42.6 % (ref 34–46.6)
HGB BLD-MCNC: 14.2 G/DL (ref 12–15.9)
HGB UR QL STRIP.AUTO: NEGATIVE
HYALINE CASTS UR QL AUTO: ABNORMAL /LPF
IMM GRANULOCYTES # BLD AUTO: 0.01 10*3/MM3 (ref 0–0.05)
IMM GRANULOCYTES NFR BLD AUTO: 0.2 % (ref 0–0.5)
KETONES UR QL STRIP: ABNORMAL
LEUKOCYTE ESTERASE UR QL STRIP.AUTO: ABNORMAL
LIPASE SERPL-CCNC: 25 U/L (ref 13–60)
LYMPHOCYTES # BLD AUTO: 1.37 10*3/MM3 (ref 0.7–3.1)
LYMPHOCYTES NFR BLD AUTO: 27.4 % (ref 19.6–45.3)
MAGNESIUM SERPL-MCNC: 2.1 MG/DL (ref 1.6–2.6)
MCH RBC QN AUTO: 32.3 PG (ref 26.6–33)
MCHC RBC AUTO-ENTMCNC: 33.3 G/DL (ref 31.5–35.7)
MCV RBC AUTO: 96.8 FL (ref 79–97)
MONOCYTES # BLD AUTO: 0.4 10*3/MM3 (ref 0.1–0.9)
MONOCYTES NFR BLD AUTO: 8 % (ref 5–12)
MUCOUS THREADS URNS QL MICRO: ABNORMAL /HPF
NEUTROPHILS NFR BLD AUTO: 3.1 10*3/MM3 (ref 1.7–7)
NEUTROPHILS NFR BLD AUTO: 62 % (ref 42.7–76)
NITRITE UR QL STRIP: NEGATIVE
NRBC BLD AUTO-RTO: 0 /100 WBC (ref 0–0.2)
PH UR STRIP.AUTO: 5.5 [PH] (ref 5–8)
PLATELET # BLD AUTO: 209 10*3/MM3 (ref 140–450)
PMV BLD AUTO: 9.5 FL (ref 6–12)
POTASSIUM SERPL-SCNC: 3.3 MMOL/L (ref 3.5–5.2)
PROT SERPL-MCNC: 6.9 G/DL (ref 6–8.5)
PROT UR QL STRIP: ABNORMAL
RBC # BLD AUTO: 4.4 10*6/MM3 (ref 3.77–5.28)
RBC # UR STRIP: ABNORMAL /HPF
REF LAB TEST METHOD: ABNORMAL
SODIUM SERPL-SCNC: 142 MMOL/L (ref 136–145)
SP GR UR STRIP: >1.03 (ref 1–1.03)
SQUAMOUS #/AREA URNS HPF: ABNORMAL /HPF
UROBILINOGEN UR QL STRIP: ABNORMAL
WBC # UR STRIP: ABNORMAL /HPF
WBC NRBC COR # BLD AUTO: 5 10*3/MM3 (ref 3.4–10.8)

## 2025-07-20 PROCEDURE — 25810000003 LACTATED RINGERS SOLUTION: Performed by: PHYSICIAN ASSISTANT

## 2025-07-20 PROCEDURE — 80053 COMPREHEN METABOLIC PANEL: CPT | Performed by: PHYSICIAN ASSISTANT

## 2025-07-20 PROCEDURE — 85025 COMPLETE CBC W/AUTO DIFF WBC: CPT | Performed by: PHYSICIAN ASSISTANT

## 2025-07-20 PROCEDURE — 25010000002 HYDROMORPHONE PER 4 MG: Performed by: EMERGENCY MEDICINE

## 2025-07-20 PROCEDURE — 25010000002 HYDROMORPHONE 1 MG/ML SOLUTION: Performed by: EMERGENCY MEDICINE

## 2025-07-20 PROCEDURE — 83735 ASSAY OF MAGNESIUM: CPT | Performed by: PHYSICIAN ASSISTANT

## 2025-07-20 PROCEDURE — 96374 THER/PROPH/DIAG INJ IV PUSH: CPT

## 2025-07-20 PROCEDURE — 25010000002 OCTREOTIDE PER 25 MCG: Performed by: EMERGENCY MEDICINE

## 2025-07-20 PROCEDURE — 81001 URINALYSIS AUTO W/SCOPE: CPT | Performed by: PHYSICIAN ASSISTANT

## 2025-07-20 PROCEDURE — 96372 THER/PROPH/DIAG INJ SC/IM: CPT

## 2025-07-20 PROCEDURE — 96375 TX/PRO/DX INJ NEW DRUG ADDON: CPT

## 2025-07-20 PROCEDURE — 83690 ASSAY OF LIPASE: CPT | Performed by: PHYSICIAN ASSISTANT

## 2025-07-20 RX ORDER — HYDROMORPHONE HYDROCHLORIDE 1 MG/ML
0.5 INJECTION, SOLUTION INTRAMUSCULAR; INTRAVENOUS; SUBCUTANEOUS ONCE
Refills: 0 | Status: DISCONTINUED | OUTPATIENT
Start: 2025-07-20 | End: 2025-07-20

## 2025-07-20 RX ORDER — OCTREOTIDE ACETATE 500 UG/ML
200 INJECTION, SOLUTION INTRAVENOUS; SUBCUTANEOUS ONCE
Status: COMPLETED | OUTPATIENT
Start: 2025-07-20 | End: 2025-07-20

## 2025-07-20 RX ORDER — HYDROMORPHONE HYDROCHLORIDE 1 MG/ML
0.5 INJECTION, SOLUTION INTRAMUSCULAR; INTRAVENOUS; SUBCUTANEOUS ONCE
Refills: 0 | Status: COMPLETED | OUTPATIENT
Start: 2025-07-20 | End: 2025-07-20

## 2025-07-20 RX ORDER — SODIUM CHLORIDE 0.9 % (FLUSH) 0.9 %
10 SYRINGE (ML) INJECTION AS NEEDED
Status: DISCONTINUED | OUTPATIENT
Start: 2025-07-20 | End: 2025-07-20 | Stop reason: HOSPADM

## 2025-07-20 RX ADMIN — HYDROMORPHONE HYDROCHLORIDE 0.5 MG: 1 INJECTION, SOLUTION INTRAMUSCULAR; INTRAVENOUS; SUBCUTANEOUS at 00:35

## 2025-07-20 RX ADMIN — HYDROMORPHONE HYDROCHLORIDE 1 MG: 1 INJECTION, SOLUTION INTRAMUSCULAR; INTRAVENOUS; SUBCUTANEOUS at 03:33

## 2025-07-20 RX ADMIN — OCTREOTIDE ACETATE 200 MCG: 500 INJECTION, SOLUTION INTRAVENOUS; SUBCUTANEOUS at 02:35

## 2025-07-20 RX ADMIN — SODIUM CHLORIDE, POTASSIUM CHLORIDE, SODIUM LACTATE AND CALCIUM CHLORIDE 1000 ML: 600; 310; 30; 20 INJECTION, SOLUTION INTRAVENOUS at 00:27

## 2025-07-20 NOTE — DISCHARGE INSTRUCTIONS
Follow-up with Dr. Ye and primary care provider.  Continue to take your prescribed medications.  Return to emergency department for any worsening symptoms.

## 2025-07-20 NOTE — ED TRIAGE NOTES
Pt ambulatory to triage from home with c/o diarrhea for last few weeks. Pt history of colon cancer, due to have colostomy on 7/29.  Pt states unable to eat/drink anything, and is having burning pain in rectum due to diarrhea. Pt states due to get fluids Wednesday at oncologists office, but feels she needs them sooner

## 2025-07-20 NOTE — ED PROVIDER NOTES
EMERGENCY DEPARTMENT ENCOUNTER  Room Number:  04/04  PCP: Deepika Vela III, NP-C  Independent Historians: Historian: Patient      HPI:  Chief Complaint: had concerns including Diarrhea.     A complete HPI/ROS/PMH/PSH/SH/FH are unobtainable due to: EM_Unobtainable History : None    Chronic or social conditions impacting patient care (Social Determinants of Health): None      Context: Carole Weaver is a 44 y.o. female with a medical history of, rectal adenocarcinoma, factor V Leiden, PE, esophagitis, hyperlipidemia, GERD who presents to the ED c/o acute diarrhea.  For approximately 2 weeks she has been unable to eat or drink anything without experiencing diarrhea and rectal area pain.  Has noticed some bright red blood in the stools.  She has tried hydrocortisone cream and sitz bath's for hemorrhoidal pain, however symptoms have not improved.  She denies fevers, chills, night sweats, shortness of breath, chest pain, or abdominal pain.  She has experienced recent weight loss, however she attributes this to chemotherapy.  Established with Dr. Ye with colorectal surgery.  She denies any other systemic symptoms at this time.      Review of prior external notes (non-ED) -and- Review of prior external test results outside of this encounter: Patient seen in office by colon surgery for history of rectal cancer. Reviewed assessment and plan. Patient scheduled for permanent colostomy on 7/29. Reviewed prior laboratory studies. CBC with hemoglobin 13.7, CMP with creatinine 0.57.    Prescription drug monitoring program review:     EM_[place] : N/A    PAST MEDICAL HISTORY  Active Ambulatory Problems     Diagnosis Date Noted    Anxiety 08/09/2016    Irritable bowel syndrome 08/09/2016    Monopolar depression 05/09/2019    Adenocarcinoma of rectum 07/12/2019    Family history of factor V Leiden mutation 08/01/2019    Heterozygous factor V Leiden mutation 08/02/2019    Encounter for fitting and adjustment of  vascular catheter 08/07/2019    Functional diarrhea 09/03/2019    Adverse effect of antineoplastic and immunosuppressive drugs, subsequent encounter 09/03/2019    Hypokalemia 09/03/2019    Hypomagnesemia 09/03/2019    Other pulmonary embolism without acute cor pulmonale 09/20/2019    Hydronephrosis with ureteropelvic junction (UPJ) obstruction 10/15/2019    Partial intestinal obstruction, unspecified as to cause 07/08/2019    Irregular heart beat 12/10/2019    Gastrointestinal hemorrhage, unspecified 07/08/2019    Esophagitis 07/08/2019    Family history of colonic polyps 12/10/2019    Chronic diarrhea 12/10/2019    Calculus of gallbladder 12/10/2019    Benign neoplasm of transverse colon 07/08/2019    Benign neoplasm of rectum and anal canal 07/08/2019    Heart murmur 12/10/2019    Anemia 01/22/2020    Anticoagulation adequate 01/22/2020    Iron deficiency 02/05/2020    Adverse effect of iron 02/05/2020    Drug-induced peripheral neuropathy 04/15/2020    On antineoplastic chemotherapy 04/25/2020    Chemotherapy-induced thrombocytopenia 04/25/2020    HTN (hypertension) 04/25/2020    Chronic anticoagulation 04/25/2020    Chemotherapy-induced neutropenia 04/29/2020    Hand foot syndrome 05/13/2020    Secondary cancer of lung 10/05/2020    Leukocytopenia 11/10/2020    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    Tachycardia 12/23/2020    Macrocytosis without anemia 06/22/2021    Pulmonary embolism without acute cor pulmonale 09/20/2019    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    On anticoagulant therapy     Lump of right breast     Kidney stones 08/09/2007    HPV in female 1998    History of radiation therapy 2019    History of gestational diabetes     History of chemotherapy 2019    History of anemia     Hemorrhoids     GERD (gastroesophageal reflux disease)     Fistula of intestine     Colon polyps     Chemotherapy induced diarrhea 01/2021    Cervical lymphadenitis 06/06/2012    Bilateral epiphora 04/2020     Family history of colon cancer 10/05/2022    Partial small bowel obstruction 2023    History of pulmonary embolism 2023    Coronary artery calcification 2024    Tobacco abuse 2024    Encounter for long-term (current) use of high-risk medication 2024    Dehydration 2025    Mixed hyperlipidemia 2025    History of rectal cancer 2025    Intractable diarrhea 2025     Resolved Ambulatory Problems     Diagnosis Date Noted    Immunization due 2018    UTI (urinary tract infection) 2019    Kidney stone on right side 10/07/2019    Rectal cancer 2019    Hematochezia 12/10/2019    Loss of weight 12/10/2019    Pain associated with surgical procedure 2020    Ileostomy present 2020    Pulmonary nodule, right 2020    Rectal cancer 2020    Rectal cancer 2020    Abdominopelvic abscess 2020    Perirectal abscess 2020    Pulmonary nodules 2020    Hyponatremia 2020    Malignant neoplasm of colon 01/15/2021    Dysuria 2021    History of rectal cancer 2021    Urinary urgency 2020    Sepsis due to cellulitis 2002    Sciatica     SAB (spontaneous ) 1996    Right ureteral stone 10/01/2019    Right shoulder pain 2020    Rectal cancer 2019    Macrocytosis 2021    Lung cancer 2020    Infected insect bite of neck 2016    Ileostomy in place     IBS (irritable bowel syndrome)     History of TB skin testing 2009    History of pre-eclampsia     Factor V Leiden mutation     Depression     Cystitis 2020    Cystitis 10/27/2020    Chemotherapy-induced nausea 2020    Chemotherapy induced neutropenia 2020    Bilateral hand swelling 2020    Anemia in pregnancy     Abnormal Pap smear of cervix 1998    Neck pain on left side 2023     Past Medical History:   Diagnosis Date    Abscess of abdominal wall 2012    COVID-19 2022     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 JAMEY TALAVERA AT [facility]    COLON RESECTION N/A 12/02/2019    Procedure: laparoscopic low anterior colon resection with mobilization of splenic flexure and diverting loop ileostomy: ERAS;  Surgeon: Padmaja Esparza MD;  Location: [facility] OR;  Service: General    COLON RESECTION N/A 08/03/2020    Procedure: LOW ANTERIOR COLON RESECTION, COLOANAL ANASTOMOSIS, MOBILIZATION SPLENIC FLEXURE;  Surgeon: Padmaja Esparza MD;  Location: [facility] OR;  Service: General;  Laterality: N/A;    COLONOSCOPY N/A 07/15/2020    PATENT ANASTAMOSIS IN MID RECTUM, RESCOPE IN 1 YR, DR. PADMAJA ESPARZA AT [facility]    COLONOSCOPY N/A 11/03/2022    DIFFUSE AREA OF MODERATELY FRIABLE MUCOSA IN ENTIRE COLON , DIVERSION COLITIS, PATENT AND HEALTHY ANASTAMOSIS, DR. PADMAJA ESPARZA AT [facility]    COLONOSCOPY N/A 12/04/2023    6 MM SESSILE SERRATED ADENOMA POLYP IN DESCENDING, PATENT ANASTAMOSIS IN DISTAL RECTUM, RESCOPE IN 2 YRS, DR. PADMAJA ESPARZA AT [facility]    COLONOSCOPY N/A 06/04/2025    PATENT AND HEALTHY ANASTAMOSIS IN DISTAL RECTUM, RESCOPE IN 2 YRS, DR. PADMAJA ESPARZA AT [facility]    COLONOSCOPY W/ POLYPECTOMY N/A 07/08/2019    15 MM TUBULOVILLOUS ADENOMA POLYP IN HEPATIC FLEXURE, 20 MMTUBULOVILLOUS ADENOMA WITH HIGH GRADE DYSPLASIA IN RECTOSIGMOID, 4 CM MASS IN MID RECTUM, PATH: INVASIVE MODERATELY DIFFERENTIATED ADENOCARCINOMA, DR. JENNIFER LI AT [facility]    DILATATION AND EVACUATION N/A 2009    ENDOSCOPY N/A 07/08/2019    LA GRADE A ESOPHAGITIS, GASTRITIS, ALL BIOPSIES BENIGN, DR. JENNIFER LI AT [facility]    ILEOSTOMY CLOSURE N/A 11/14/2022    Procedure: ILEOSTOMY TAKEDOWN;  Surgeon: Padmaja Esparza MD;  Location: [facility]  OR;  Service: General;  Laterality: N/A;    INCISION AND DRAINAGE PERIRECTAL ABSCESS N/A 08/14/2020    Procedure: INCISION AND DRAINAGE OF retrorectal dehiscence abcess with drain placement and irrigation;  Surgeon: Geronimo Esparza MD;  Location: [facility] MAIN OR;  Service: General;  Laterality: N/A;    PAP SMEAR N/A 01/24/2014    SIGMOIDOSCOPY N/A 07/24/2019    INFILTRATIVE PARTIALLY OBSTRUCTING LARGE RECTAL CANCER, AREA TATOOED, DR. GERONIMO ESPARZA AT [facility]    SIGMOIDOSCOPY N/A 11/23/2019    AN ULCERATED PARTIALLY OBSTRUCTING MASS IN MID RECTUM, AREA TATTOOED, DR. GERONIMO ESPARZA AT [facility]    SIGMOIDOSCOPY N/A 07/22/2021    PATENT ANASTAMOSIS IN DISTAL RECTUM, RESCOPE IN 1 YR, DR. GERONIMO ESPARZA AT [facility]    SIGMOIDOSCOPY N/A 09/11/2024    PATCHY INFLAMMATION AND EDEMA IN DESCENDING, AREA BIOPSIED AND WAS BENIGN, PATENT AND HEALTHY ANASTAMOSIS, HEMORRHOIDS,RESCOPE(CY) 12/2025, DR. GERONIMO ESPARZA AT [facility]    TRANSRECTAL ULTRASOUND N/A 07/24/2019    Procedure: ULTRASOUND TRANSRECTAL;  Surgeon: Geronimo Esparza MD;  Location: [facility] ENDOSCOPY;  Service: General    URETEROSCOPY LASER LITHOTRIPSY WITH STENT INSERTION Right 10/30/2019    DR. ESTUARDO BEASLEY AT [place]    VAGINAL DELIVERY N/A 09/18/1998    VENOUS ACCESS DEVICE (PORT) INSERTION Left 08/09/2019    Procedure: INSERTION VENOUS ACCESS DEVICE;  Surgeon: Sj Joseph MD;  Location:  [facility];  Service: General    VENOUS ACCESS DEVICE (PORT) INSERTION N/A 12/18/2020    Procedure: INSERTION VENOUS ACCESS DEVICE right side, removal venous access device left side;  Surgeon: Sj Joseph MD;  Location:  [facility];  Service: General;  Laterality: N/A;    WISDOM TOOTH EXTRACTION Bilateral 1993         FAMILY HISTORY  Family History   Problem Relation Age of Onset    Hyperlipidemia Mother     Colon polyps Mother     Heart disease Mother     Hypertension Mother     Factor V Leiden deficiency Mother     Skin cancer Father         Squamous in 60s    Atrial fibrillation Father      Drug abuse Sister     Mental illness Sister         Addiction    No Known Problems Son     Colon cancer Maternal Uncle     Cancer Paternal Uncle     Colon cancer Paternal Uncle     Diabetes Paternal Uncle     Heart disease Paternal Grandfather     Cancer Paternal Aunt         OVARIAN    Malig Hyperthermia Neg Hx          SOCIAL HISTORY  Social History     Socioeconomic History    Marital status:      Spouse name: Justus    Number of children: 1    Years of education: College   Tobacco Use    Smoking status: Every Day     Current packs/day: 1.00     Average packs/day: 1 pack/day for 25.0 years (25.0 ttl pk-yrs)     Types: Cigarettes     Passive exposure: Current    Smokeless tobacco: Never    Tobacco comments:     1 PPD/caffeine use    Vaping Use    Vaping status: Former    Substances: Nicotine    Devices: Disposable    Passive vaping exposure: Yes   Substance and Sexual Activity    Alcohol use: Yes     Comment: RARELY    Drug use: Never    Sexual activity: Yes     Partners: Male     Comment: .         ALLERGIES  Patient has no known allergies.      REVIEW OF SYSTEMS  Included in HPI  All systems reviewed and negative except for those discussed in HPI.      PHYSICAL EXAM    I have reviewed the triage vital signs and nursing notes.    ED Triage Vitals   Temp Heart Rate Resp BP SpO2   07/19/25 2324 07/19/25 2324 07/19/25 2324 07/19/25 2330 07/19/25 2324   98.1 °F (36.7 °C) 70 18 152/100 100 %      Temp src Heart Rate Source Patient Position BP Location FiO2 (%)   07/19/25 2324 07/19/25 2324 07/19/25 2330 07/19/25 2330 --   Oral Monitor Sitting Right arm        Physical Exam  Constitutional:       General: She is not in acute distress.     Appearance: She is well-developed.   HENT:      Head: Normocephalic and atraumatic.   Eyes:      Extraocular Movements: Extraocular movements intact.   Cardiovascular:      Rate and Rhythm: Normal rate and regular rhythm.      Heart sounds: Normal heart sounds.    Pulmonary:      Effort: Pulmonary effort is normal.      Breath sounds: Normal breath sounds.   Abdominal:      General: There is no distension.   Skin:     General: Skin is warm.   Neurological:      General: No focal deficit present.      Mental Status: She is alert and oriented to person, place, and time.   Psychiatric:         Mood and Affect: Mood normal.             LAB RESULTS  Recent Results (from the past 24 hours)   Comprehensive Metabolic Panel    Collection Time: 07/20/25 12:07 AM    Specimen: Blood   Result Value Ref Range    Glucose 71 65 - 99 mg/dL    BUN 8.0 6.0 - 20.0 mg/dL    Creatinine 0.66 0.57 - 1.00 mg/dL    Sodium 142 136 - 145 mmol/L    Potassium 3.3 (L) 3.5 - 5.2 mmol/L    Chloride 107 98 - 107 mmol/L    CO2 24.0 22.0 - 29.0 mmol/L    Calcium 9.0 8.6 - 10.5 mg/dL    Total Protein 6.9 6.0 - 8.5 g/dL    Albumin 4.0 3.5 - 5.2 g/dL    ALT (SGPT) 13 1 - 33 U/L    AST (SGOT) 21 1 - 32 U/L    Alkaline Phosphatase 90 39 - 117 U/L    Total Bilirubin 0.2 0.0 - 1.2 mg/dL    Globulin 2.9 gm/dL    A/G Ratio 1.4 g/dL    BUN/Creatinine Ratio 12.1 7.0 - 25.0    Anion Gap 11.0 5.0 - 15.0 mmol/L    eGFR 110.4 >60.0 mL/min/1.73   Lipase    Collection Time: 07/20/25 12:07 AM    Specimen: Blood   Result Value Ref Range    Lipase 25 13 - 60 U/L   Urinalysis With Microscopic If Indicated (No Culture) - Urine, Clean Catch    Collection Time: 07/20/25 12:07 AM    Specimen: Urine, Clean Catch   Result Value Ref Range    Color, UA Dark Yellow (A) Yellow, Straw    Appearance, UA Clear Clear    pH, UA 5.5 5.0 - 8.0    Specific Gravity, UA >1.030 (H) 1.005 - 1.030    Glucose, UA Negative Negative    Ketones, UA Trace (A) Negative    Bilirubin, UA Negative Negative    Blood, UA Negative Negative    Protein, UA 30 mg/dL (1+) (A) Negative    Leuk Esterase, UA Trace (A) Negative    Nitrite, UA Negative Negative    Urobilinogen, UA 1.0 E.U./dL 0.2 - 1.0 E.U./dL   Magnesium    Collection Time: 07/20/25 12:07 AM    Specimen:  Blood   Result Value Ref Range    Magnesium 2.1 1.6 - 2.6 mg/dL   CBC Auto Differential    Collection Time: 07/20/25 12:07 AM    Specimen: Blood   Result Value Ref Range    WBC 5.00 3.40 - 10.80 10*3/mm3    RBC 4.40 3.77 - 5.28 10*6/mm3    Hemoglobin 14.2 12.0 - 15.9 g/dL    Hematocrit 42.6 34.0 - 46.6 %    MCV 96.8 79.0 - 97.0 fL    MCH 32.3 26.6 - 33.0 pg    MCHC 33.3 31.5 - 35.7 g/dL    RDW 12.7 12.3 - 15.4 %    RDW-SD 45.2 37.0 - 54.0 fl    MPV 9.5 6.0 - 12.0 fL    Platelets 209 140 - 450 10*3/mm3    Neutrophil % 62.0 42.7 - 76.0 %    Lymphocyte % 27.4 19.6 - 45.3 %    Monocyte % 8.0 5.0 - 12.0 %    Eosinophil % 2.2 0.3 - 6.2 %    Basophil % 0.2 0.0 - 1.5 %    Immature Grans % 0.2 0.0 - 0.5 %    Neutrophils, Absolute 3.10 1.70 - 7.00 10*3/mm3    Lymphocytes, Absolute 1.37 0.70 - 3.10 10*3/mm3    Monocytes, Absolute 0.40 0.10 - 0.90 10*3/mm3    Eosinophils, Absolute 0.11 0.00 - 0.40 10*3/mm3    Basophils, Absolute 0.01 0.00 - 0.20 10*3/mm3    Immature Grans, Absolute 0.01 0.00 - 0.05 10*3/mm3    nRBC 0.0 0.0 - 0.2 /100 WBC   Urinalysis, Microscopic Only - Urine, Clean Catch    Collection Time: 07/20/25 12:07 AM    Specimen: Urine, Clean Catch   Result Value Ref Range    RBC, UA 0-2 None Seen, 0-2 /HPF    WBC, UA 0-2 None Seen, 0-2 /HPF    Bacteria, UA 1+ (A) None Seen /HPF    Squamous Epithelial Cells, UA 0-2 None Seen, 0-2 /HPF    Hyaline Casts, UA None Seen None Seen /LPF    Calcium Oxalate Crystals, UA Present None Seen /HPF    Mucus, UA Small/1+ (A) None Seen, Trace /HPF    Methodology Manual Light Microscopy            MEDICATIONS GIVEN IN ER  Medications   sodium chloride 0.9 % flush 10 mL (has no administration in time range)   lactated ringers bolus 1,000 mL (0 mL Intravenous Stopped 7/20/25 0347)   HYDROmorphone (DILAUDID) injection 0.5 mg (0.5 mg Intravenous Given 7/20/25 0035)   octreotide (sandoSTATIN) injection 200 mcg (200 mcg Intravenous Given 7/20/25 0235)   HYDROmorphone (DILAUDID) injection 1 mg  (1 mg Intramuscular Given 7/20/25 0333)         ORDERS PLACED DURING THIS VISIT:  Orders Placed This Encounter   Procedures    Gastrointestinal Panel, PCR - Stool, Per Rectum    Clostridioides difficile Toxin - Stool, Per Rectum    Clostridioides difficile Toxin, PCR - Stool, Per Rectum    Comprehensive Metabolic Panel    Lipase    Urinalysis With Microscopic If Indicated (No Culture) - Urine, Clean Catch    Magnesium    CBC Auto Differential    Urinalysis, Microscopic Only - Urine, Clean Catch    Patient Isolation Contact Spore    Insert Peripheral IV    CBC & Differential         OUTPATIENT MEDICATION MANAGEMENT:  Current Facility-Administered Medications Ordered in Epic   Medication Dose Route Frequency Provider Last Rate Last Admin    sodium chloride 0.9 % flush 10 mL  10 mL Intravenous PRN Raegan Kaur PA-C         Current Outpatient Medications Ordered in Epic   Medication Sig Dispense Refill    clonazePAM (KlonoPIN) 1 MG tablet TAKE 1 TABLET AS NEEDED DAILY FOR ANXIETY. MAY TAKE ONE ADDITIONAL AS NEEDED FOR SEVERE ANXIETY. 45 tablet 0    Cyanocobalamin (VITAMIN B-12 PO) Take 1 tablet by mouth 3 (Three) Times a Week. M-W-F      dicyclomine (BENTYL) 20 MG tablet TAKE 1 TABLET BY MOUTH EVERY 6 (SIX) HOURS AS NEEDED FOR IRRITABLE BOWEL SYMPTOMS 360 tablet 2    diphenoxylate-atropine (LOMOTIL) 2.5-0.025 MG per tablet TAKE 2 TABLETS BY MOUTH 4 TIMES A  tablet 5    enoxaparin sodium (LOVENOX) 100 MG/ML solution prefilled syringe syringe INJECT 1 ML UNDER THE SKIN INTO THE APPROPRIATE AREA AS DIRECTED EVERY 12 (TWELVE) HOURS. 60 mL 2    escitalopram (LEXAPRO) 10 MG tablet TAKE 1 TABLET BY MOUTH EVERY DAY 90 tablet 1    famotidine (PEPCID) 20 MG tablet TAKE 1 TABLET BY MOUTH TWICE A  tablet 2    HYDROcodone-acetaminophen (NORCO) 5-325 MG per tablet Take 2 tablets by mouth Every 6 (Six) Hours As Needed for Moderate Pain. 90 tablet 0    hydrocortisone 2.5 % cream APPLY RECTALLY 3 TIMES DAILY. INCLUDE  "APPLICATOR.      Hydrocortisone, Perianal, (ANUSOL-HC) 2.5 % rectal cream Apply rectally 3 times daily.  Include applicator. 30 g 1    Loperamide HCl (IMODIUM PO) Take  by mouth As Needed.      Loratadine 10 MG capsule Take 1 capsule by mouth Every Evening.      metoprolol succinate XL (TOPROL-XL) 25 MG 24 hr tablet TAKE 0.5 TABLETS BY MOUTH EVERY NIGHT 45 tablet 2    nystatin (MYCOSTATIN) 415010 UNIT/GM cream Apply 1 Application topically to the appropriate area as directed 2 (Two) Times a Day. 30 g 2    nystatin-zinc oxide-hydrocortisone-bacitracin Apply topically to the appropriate area as directed Daily As Needed. 60 g 3    octreotide (sandoSTATIN) 100 MCG/ML injection INJECT 1 ML UNDER THE SKIN INTO THE APPROPRIATE AREA AS DIRECTED 3 TIMES A DAY 90 mL 2    ondansetron (ZOFRAN) 8 MG tablet TAKE 1 TABLET BY MOUTH THREE TIMES A DAY AS NEEDED FOR NAUSEA AND VOMITING 60 tablet 1    pantoprazole (PROTONIX) 40 MG EC tablet TAKE 1 TABLET BY MOUTH EVERY DAY 90 tablet 1    rosuvastatin (CRESTOR) 5 MG tablet Take 1 tablet by mouth Daily. NEEDS AN APPOINTMENT FOR FUTURE REFILLS 90 tablet 0    Syringe/Needle, Disp, (BD SafetyGlide Syringe/Needle) 27G X 5/8\" 1 ML misc Use 1 mL 3 (Three) Times a Day As Needed (as needed for diarrhea). 100 each 1           PROGRESS, DATA ANALYSIS, CONSULTS, AND MEDICAL DECISION MAKING  All labs have been independently interpreted by me.  All radiology studies have been reviewed by me. All EKG's have been independently viewed and interpreted by me.  Discussion below represents my analysis of pertinent findings related to patient's condition, differential diagnosis, treatment plan and final disposition.    Differential diagnosis includes but is not limited to dehydration, hypokalemia, rectal adenocarcinoma.        ED Course as of 07/20/25 0351   Sun Jul 20, 2025   0054 WBC: 5.00 [MP]   0054 Hemoglobin: 14.2 [MP]   0054 BUN: 8.0 [MP]   0054 Creatinine: 0.66 [MP]   0054 Potassium(!): 3.3 [MP] "   0314 Reassessed the patient.  She has received IV fluids and Dilaudid.  She is feeling improved.  I did offer her admission for pain control and colon surgery consultation.  She declines and would like to try to go home.  Will order her 1 more dose of pain medication at this time.  She will return for any worsening symptoms. [MP]      ED Course User Index  [MP] Raegan Kaur PA-C             AS OF 03:51 EDT VITALS:    BP - 124/95  HR - 78  TEMP - 98.1 °F (36.7 °C) (Oral)  O2 SATS - 98%    COMPLEXITY OF CARE  Admission was considered but after careful review of the patient's presentation, physical examination, diagnostic results, and response to treatment the patient may be safely discharged with outpatient follow-up.      DIAGNOSIS  Final diagnoses:   Diarrhea, unspecified type   Adenocarcinoma of rectum         DISPOSITION  ED Disposition       ED Disposition   Discharge    Condition   Stable    Comment   --                Please note that portions of this document were completed with a voice recognition program.    Note Disclaimer: At [facility], we believe that sharing information builds trust and better relationships. You are receiving this note because you recently visited [facility]. It is possible you will see health information before a provider has talked with you about it. This kind of information can be easy to misunderstand. To help you fully understand what it means for your health, we urge you to discuss this note with your provider.     Raegan Kaur PA-C  07/20/25 0352

## 2025-07-20 NOTE — ED PROVIDER NOTES
I have personally performed a face-to-face diagnostic evaluation of the patient.  I have reviewed and agree with the care plan as outlined by NP/PA.  My findings are as follows:    HPI:  Patient is a 44 y.o. female who presents with complaints of significant diarrhea over the past 2 weeks.  She is currently being treated with chemotherapy for history of rectal cancer.  She typically has episodes of anorexia, diarrhea after her chemotherapy infusions, which usually last about 4 days.  However for the past 2 weeks intractable diarrhea to the point where she is sleeping in the bathtub because she cannot even get to the toilet from the bathtub in time.  She has a burning sensation in her rectum when she is having diarrhea.  She is currently being managed with 4 times daily Lomotil, loperamide, subcu octreotide without any improvement in symptoms.      PE:  Physical Exam  Constitutional:       Appearance: Normal appearance.   HENT:      Head: Normocephalic and atraumatic.   Eyes:      Pupils: Pupils are equal, round, and reactive to light.   Cardiovascular:      Rate and Rhythm: Normal rate and regular rhythm.      Heart sounds: No murmur heard.  Abdominal:      Tenderness: There is no abdominal tenderness. There is no guarding or rebound.      Comments: Abdominal wall bruising consistent with subcutaneous injections, multiple previous surgical incisions, well-healed   Skin:     General: Skin is warm.   Neurological:      Mental Status: She is alert and oriented to person, place, and time.           MDM:  I provided a substantiate portion of the care of this patient. I personally performed the medical decision making.    Medical records are reviewed in [facility] Everywhere, if applicable        Recent Results (from the past 24 hours)   Comprehensive Metabolic Panel    Collection Time: 07/20/25 12:07 AM    Specimen: Blood   Result Value Ref Range    Glucose 71 65 - 99 mg/dL    BUN 8.0 6.0 - 20.0 mg/dL    Creatinine  0.66 0.57 - 1.00 mg/dL    Sodium 142 136 - 145 mmol/L    Potassium 3.3 (L) 3.5 - 5.2 mmol/L    Chloride 107 98 - 107 mmol/L    CO2 24.0 22.0 - 29.0 mmol/L    Calcium 9.0 8.6 - 10.5 mg/dL    Total Protein 6.9 6.0 - 8.5 g/dL    Albumin 4.0 3.5 - 5.2 g/dL    ALT (SGPT) 13 1 - 33 U/L    AST (SGOT) 21 1 - 32 U/L    Alkaline Phosphatase 90 39 - 117 U/L    Total Bilirubin 0.2 0.0 - 1.2 mg/dL    Globulin 2.9 gm/dL    A/G Ratio 1.4 g/dL    BUN/Creatinine Ratio 12.1 7.0 - 25.0    Anion Gap 11.0 5.0 - 15.0 mmol/L    eGFR 110.4 >60.0 mL/min/1.73   Lipase    Collection Time: 07/20/25 12:07 AM    Specimen: Blood   Result Value Ref Range    Lipase 25 13 - 60 U/L   Urinalysis With Microscopic If Indicated (No Culture) - Urine, Clean Catch    Collection Time: 07/20/25 12:07 AM    Specimen: Urine, Clean Catch   Result Value Ref Range    Color, UA Dark Yellow (A) Yellow, Straw    Appearance, UA Clear Clear    pH, UA 5.5 5.0 - 8.0    Specific Gravity, UA >1.030 (H) 1.005 - 1.030    Glucose, UA Negative Negative    Ketones, UA Trace (A) Negative    Bilirubin, UA Negative Negative    Blood, UA Negative Negative    Protein, UA 30 mg/dL (1+) (A) Negative    Leuk Esterase, UA Trace (A) Negative    Nitrite, UA Negative Negative    Urobilinogen, UA 1.0 E.U./dL 0.2 - 1.0 E.U./dL   Magnesium    Collection Time: 07/20/25 12:07 AM    Specimen: Blood   Result Value Ref Range    Magnesium 2.1 1.6 - 2.6 mg/dL   CBC Auto Differential    Collection Time: 07/20/25 12:07 AM    Specimen: Blood   Result Value Ref Range    WBC 5.00 3.40 - 10.80 10*3/mm3    RBC 4.40 3.77 - 5.28 10*6/mm3    Hemoglobin 14.2 12.0 - 15.9 g/dL    Hematocrit 42.6 34.0 - 46.6 %    MCV 96.8 79.0 - 97.0 fL    MCH 32.3 26.6 - 33.0 pg    MCHC 33.3 31.5 - 35.7 g/dL    RDW 12.7 12.3 - 15.4 %    RDW-SD 45.2 37.0 - 54.0 fl    MPV 9.5 6.0 - 12.0 fL    Platelets 209 140 - 450 10*3/mm3    Neutrophil % 62.0 42.7 - 76.0 %    Lymphocyte % 27.4 19.6 - 45.3 %    Monocyte % 8.0 5.0 - 12.0 %     Eosinophil % 2.2 0.3 - 6.2 %    Basophil % 0.2 0.0 - 1.5 %    Immature Grans % 0.2 0.0 - 0.5 %    Neutrophils, Absolute 3.10 1.70 - 7.00 10*3/mm3    Lymphocytes, Absolute 1.37 0.70 - 3.10 10*3/mm3    Monocytes, Absolute 0.40 0.10 - 0.90 10*3/mm3    Eosinophils, Absolute 0.11 0.00 - 0.40 10*3/mm3    Basophils, Absolute 0.01 0.00 - 0.20 10*3/mm3    Immature Grans, Absolute 0.01 0.00 - 0.05 10*3/mm3    nRBC 0.0 0.0 - 0.2 /100 WBC   Urinalysis, Microscopic Only - Urine, Clean Catch    Collection Time: 07/20/25 12:07 AM    Specimen: Urine, Clean Catch   Result Value Ref Range    RBC, UA 0-2 None Seen, 0-2 /HPF    WBC, UA 0-2 None Seen, 0-2 /HPF    Bacteria, UA 1+ (A) None Seen /HPF    Squamous Epithelial Cells, UA 0-2 None Seen, 0-2 /HPF    Hyaline Casts, UA None Seen None Seen /LPF    Calcium Oxalate Crystals, UA Present None Seen /HPF    Mucus, UA Small/1+ (A) None Seen, Trace /HPF    Methodology Manual Light Microscopy      I have reviewed the above labs    This is a 44-year-old female with a past medical history of rectal adenocarcinoma who is presenting today secondary to complaints of refractory postchemotherapy diarrhea.  She presents afebrile with overall benign vital signs.  Her abdomen does not demonstrate any significant tenderness to palpation.  She is morbidly having rectal pain with diarrhea rather than true abdominal pain.    She was evaluated with labs, which demonstrate mild hypokalemia, but otherwise fairly benign.  She was not able to provide a stool specimen for infectious testing.  It has largely been negative in the past and symptoms are thought to be related to her chemotherapy.    She was treated with fluids and will give an additional dose of IV octreotide.    The patient was offered admission secondary to her severe symptoms, however declines.  She would like to be discharged with close outpatient follow-up.  She is actually scheduled to have an end colostomy placed in about a week.  She is  instructed to return to the emergency department if she continues to have inability to tolerate p.o. intake, continued worsening diarrhea, fever, severe abdominal pain etc. and otherwise to follow-up with oncology and surgery.      Impression:  Final diagnoses:   Diarrhea, unspecified type   Adenocarcinoma of rectum         Disposition:  ED Disposition       ED Disposition   Discharge    Condition   Stable    Comment   --                Ami Uribe MD  07/20/25 0335

## 2025-07-21 ENCOUNTER — HOSPITAL ENCOUNTER (OUTPATIENT)
Facility: HOSPITAL | Age: 45
Setting detail: OBSERVATION
Discharge: HOME | End: 2025-07-22
Attending: EMERGENCY MEDICINE | Admitting: INTERNAL MEDICINE
Payer: COMMERCIAL

## 2025-07-21 DIAGNOSIS — K62.89 RECTAL PAIN: ICD-10-CM

## 2025-07-21 DIAGNOSIS — R19.7 DIARRHEA, UNSPECIFIED TYPE: Primary | ICD-10-CM

## 2025-07-21 PROBLEM — R15.9 FULL INCONTINENCE OF FECES: Status: ACTIVE | Noted: 2025-07-18

## 2025-07-21 LAB
ADV 40+41 DNA STL QL NAA+NON-PROBE: NOT DETECTED
ALBUMIN SERPL-MCNC: 3.7 G/DL (ref 3.5–5.2)
ALBUMIN/GLOB SERPL: 1.4 G/DL
ALP SERPL-CCNC: 100 U/L (ref 39–117)
ALT SERPL W P-5'-P-CCNC: 32 U/L (ref 1–33)
ANION GAP SERPL CALCULATED.3IONS-SCNC: 12 MMOL/L (ref 5–15)
AST SERPL-CCNC: 52 U/L (ref 1–32)
ASTRO TYP 1-8 RNA STL QL NAA+NON-PROBE: NOT DETECTED
BASOPHILS # BLD AUTO: 0.02 10*3/MM3 (ref 0–0.2)
BASOPHILS NFR BLD AUTO: 0.4 % (ref 0–1.5)
BILIRUB SERPL-MCNC: 0.3 MG/DL (ref 0–1.2)
BUN SERPL-MCNC: 7 MG/DL (ref 6–20)
BUN/CREAT SERPL: 10.8 (ref 7–25)
C CAYETANENSIS DNA STL QL NAA+NON-PROBE: NOT DETECTED
C COLI+JEJ+UPSA DNA STL QL NAA+NON-PROBE: NOT DETECTED
C DIFF TOX GENS STL QL NAA+PROBE: NEGATIVE
CALCIUM SPEC-SCNC: 8.8 MG/DL (ref 8.6–10.5)
CHLORIDE SERPL-SCNC: 107 MMOL/L (ref 98–107)
CO2 SERPL-SCNC: 23 MMOL/L (ref 22–29)
CREAT SERPL-MCNC: 0.65 MG/DL (ref 0.57–1)
CRYPTOSP DNA STL QL NAA+NON-PROBE: NOT DETECTED
DEPRECATED RDW RBC AUTO: 44.5 FL (ref 37–54)
E HISTOLYT DNA STL QL NAA+NON-PROBE: NOT DETECTED
EAEC PAA PLAS AGGR+AATA ST NAA+NON-PRB: NOT DETECTED
EC STX1+STX2 GENES STL QL NAA+NON-PROBE: NOT DETECTED
EGFRCR SERPLBLD CKD-EPI 2021: 110.8 ML/MIN/1.73
EOSINOPHIL # BLD AUTO: 0.15 10*3/MM3 (ref 0–0.4)
EOSINOPHIL NFR BLD AUTO: 3.2 % (ref 0.3–6.2)
EPEC EAE GENE STL QL NAA+NON-PROBE: DETECTED
ERYTHROCYTE [DISTWIDTH] IN BLOOD BY AUTOMATED COUNT: 12.6 % (ref 12.3–15.4)
ETEC LTA+ST1A+ST1B TOX ST NAA+NON-PROBE: NOT DETECTED
G LAMBLIA DNA STL QL NAA+NON-PROBE: NOT DETECTED
GLOBULIN UR ELPH-MCNC: 2.7 GM/DL
GLUCOSE SERPL-MCNC: 102 MG/DL (ref 65–99)
HCT VFR BLD AUTO: 40.3 % (ref 34–46.6)
HGB BLD-MCNC: 13.4 G/DL (ref 12–15.9)
IMM GRANULOCYTES # BLD AUTO: 0.01 10*3/MM3 (ref 0–0.05)
IMM GRANULOCYTES NFR BLD AUTO: 0.2 % (ref 0–0.5)
LYMPHOCYTES # BLD AUTO: 1.09 10*3/MM3 (ref 0.7–3.1)
LYMPHOCYTES NFR BLD AUTO: 23.5 % (ref 19.6–45.3)
MAGNESIUM SERPL-MCNC: 2 MG/DL (ref 1.6–2.6)
MCH RBC QN AUTO: 32.2 PG (ref 26.6–33)
MCHC RBC AUTO-ENTMCNC: 33.3 G/DL (ref 31.5–35.7)
MCV RBC AUTO: 96.9 FL (ref 79–97)
MONOCYTES # BLD AUTO: 0.46 10*3/MM3 (ref 0.1–0.9)
MONOCYTES NFR BLD AUTO: 9.9 % (ref 5–12)
NEUTROPHILS NFR BLD AUTO: 2.9 10*3/MM3 (ref 1.7–7)
NEUTROPHILS NFR BLD AUTO: 62.8 % (ref 42.7–76)
NOROVIRUS GI+II RNA STL QL NAA+NON-PROBE: NOT DETECTED
NRBC BLD AUTO-RTO: 0 /100 WBC (ref 0–0.2)
P SHIGELLOIDES DNA STL QL NAA+NON-PROBE: NOT DETECTED
PLATELET # BLD AUTO: 193 10*3/MM3 (ref 140–450)
PMV BLD AUTO: 10.2 FL (ref 6–12)
POTASSIUM SERPL-SCNC: 3 MMOL/L (ref 3.5–5.2)
POTASSIUM SERPL-SCNC: 3.8 MMOL/L (ref 3.5–5.2)
PROT SERPL-MCNC: 6.4 G/DL (ref 6–8.5)
RBC # BLD AUTO: 4.16 10*6/MM3 (ref 3.77–5.28)
RVA RNA STL QL NAA+NON-PROBE: NOT DETECTED
S ENT+BONG DNA STL QL NAA+NON-PROBE: NOT DETECTED
SAPO I+II+IV+V RNA STL QL NAA+NON-PROBE: NOT DETECTED
SHIGELLA SP+EIEC IPAH ST NAA+NON-PROBE: NOT DETECTED
SODIUM SERPL-SCNC: 142 MMOL/L (ref 136–145)
V CHOL+PARA+VUL DNA STL QL NAA+NON-PROBE: NOT DETECTED
V CHOLERAE DNA STL QL NAA+NON-PROBE: NOT DETECTED
WBC NRBC COR # BLD AUTO: 4.63 10*3/MM3 (ref 3.4–10.8)
Y ENTEROCOL DNA STL QL NAA+NON-PROBE: NOT DETECTED

## 2025-07-21 PROCEDURE — 84132 ASSAY OF SERUM POTASSIUM: CPT | Performed by: HOSPITALIST

## 2025-07-21 PROCEDURE — 25010000002 HYDROMORPHONE PER 4 MG: Performed by: EMERGENCY MEDICINE

## 2025-07-21 PROCEDURE — 99285 EMERGENCY DEPT VISIT HI MDM: CPT

## 2025-07-21 PROCEDURE — 25010000002 HYDROMORPHONE PER 4 MG: Performed by: NURSE PRACTITIONER

## 2025-07-21 PROCEDURE — G0378 HOSPITAL OBSERVATION PER HR: HCPCS

## 2025-07-21 PROCEDURE — 96365 THER/PROPH/DIAG IV INF INIT: CPT

## 2025-07-21 PROCEDURE — 25010000002 ENOXAPARIN PER 10 MG: Performed by: HOSPITALIST

## 2025-07-21 PROCEDURE — 99213 OFFICE O/P EST LOW 20 MIN: CPT | Performed by: PHYSICIAN ASSISTANT

## 2025-07-21 PROCEDURE — 25010000002 OCTREOTIDE PER 25 MCG: Performed by: INTERNAL MEDICINE

## 2025-07-21 PROCEDURE — 25010000002 OCTREOTIDE PER 25 MCG: Performed by: PHYSICIAN ASSISTANT

## 2025-07-21 PROCEDURE — 25010000002 ONDANSETRON PER 1 MG: Performed by: PHYSICIAN ASSISTANT

## 2025-07-21 PROCEDURE — 99205 OFFICE O/P NEW HI 60 MIN: CPT | Performed by: STUDENT IN AN ORGANIZED HEALTH CARE EDUCATION/TRAINING PROGRAM

## 2025-07-21 PROCEDURE — 99215 OFFICE O/P EST HI 40 MIN: CPT | Performed by: INTERNAL MEDICINE

## 2025-07-21 PROCEDURE — 80053 COMPREHEN METABOLIC PANEL: CPT | Performed by: PHYSICIAN ASSISTANT

## 2025-07-21 PROCEDURE — 25010000002 POTASSIUM CHLORIDE 10 MEQ/100ML SOLUTION: Performed by: HOSPITALIST

## 2025-07-21 PROCEDURE — 87507 IADNA-DNA/RNA PROBE TQ 12-25: CPT | Performed by: PHYSICIAN ASSISTANT

## 2025-07-21 PROCEDURE — 96375 TX/PRO/DX INJ NEW DRUG ADDON: CPT

## 2025-07-21 PROCEDURE — 87493 C DIFF AMPLIFIED PROBE: CPT | Performed by: PHYSICIAN ASSISTANT

## 2025-07-21 PROCEDURE — 25810000003 LACTATED RINGERS SOLUTION: Performed by: PHYSICIAN ASSISTANT

## 2025-07-21 PROCEDURE — 25010000002 ONDANSETRON PER 1 MG: Performed by: NURSE PRACTITIONER

## 2025-07-21 PROCEDURE — 96366 THER/PROPH/DIAG IV INF ADDON: CPT

## 2025-07-21 PROCEDURE — 25010000002 SODIUM CHLORIDE 0.9 % WITH KCL 20 MEQ 20-0.9 MEQ/L-% SOLUTION: Performed by: HOSPITALIST

## 2025-07-21 PROCEDURE — 96376 TX/PRO/DX INJ SAME DRUG ADON: CPT

## 2025-07-21 PROCEDURE — 96372 THER/PROPH/DIAG INJ SC/IM: CPT

## 2025-07-21 PROCEDURE — 85025 COMPLETE CBC W/AUTO DIFF WBC: CPT | Performed by: PHYSICIAN ASSISTANT

## 2025-07-21 PROCEDURE — 83735 ASSAY OF MAGNESIUM: CPT | Performed by: PHYSICIAN ASSISTANT

## 2025-07-21 PROCEDURE — 99417 PROLNG OP E/M EACH 15 MIN: CPT | Performed by: INTERNAL MEDICINE

## 2025-07-21 RX ORDER — ACETAMINOPHEN 500 MG
1000 TABLET ORAL ONCE
OUTPATIENT
Start: 2025-07-21 | End: 2025-07-21

## 2025-07-21 RX ORDER — CHLORHEXIDINE GLUCONATE 40 MG/ML
1 SOLUTION TOPICAL 2 TIMES DAILY
Qty: 236 ML | Refills: 0 | OUTPATIENT
Start: 2025-07-21 | End: 2025-07-22 | Stop reason: HOSPADM

## 2025-07-21 RX ORDER — ACETAMINOPHEN 650 MG/1
650 SUPPOSITORY RECTAL EVERY 4 HOURS PRN
Status: DISCONTINUED | OUTPATIENT
Start: 2025-07-21 | End: 2025-07-22 | Stop reason: HOSPADM

## 2025-07-21 RX ORDER — SODIUM CHLORIDE 9 MG/ML
40 INJECTION, SOLUTION INTRAVENOUS AS NEEDED
Status: DISCONTINUED | OUTPATIENT
Start: 2025-07-21 | End: 2025-07-22 | Stop reason: HOSPADM

## 2025-07-21 RX ORDER — CELECOXIB 100 MG/1
200 CAPSULE ORAL ONCE
OUTPATIENT
Start: 2025-07-21 | End: 2025-07-21

## 2025-07-21 RX ORDER — HYDROCODONE BITARTRATE AND ACETAMINOPHEN 5; 325 MG/1; MG/1
2 TABLET ORAL EVERY 6 HOURS PRN
Refills: 0 | Status: DISCONTINUED | OUTPATIENT
Start: 2025-07-21 | End: 2025-07-22 | Stop reason: HOSPADM

## 2025-07-21 RX ORDER — ACETAMINOPHEN 325 MG/1
650 TABLET ORAL EVERY 4 HOURS PRN
Status: DISCONTINUED | OUTPATIENT
Start: 2025-07-21 | End: 2025-07-22 | Stop reason: HOSPADM

## 2025-07-21 RX ORDER — LOPERAMIDE HYDROCHLORIDE 2 MG/1
2 CAPSULE ORAL 3 TIMES DAILY PRN
Status: DISCONTINUED | OUTPATIENT
Start: 2025-07-21 | End: 2025-07-21

## 2025-07-21 RX ORDER — ALVIMOPAN 12 MG/1
12 CAPSULE ORAL ONCE
OUTPATIENT
Start: 2025-07-21 | End: 2025-07-21

## 2025-07-21 RX ORDER — ACETAMINOPHEN 160 MG/5ML
650 SOLUTION ORAL EVERY 4 HOURS PRN
Status: DISCONTINUED | OUTPATIENT
Start: 2025-07-21 | End: 2025-07-22 | Stop reason: HOSPADM

## 2025-07-21 RX ORDER — METOPROLOL SUCCINATE 25 MG/1
12.5 TABLET, EXTENDED RELEASE ORAL NIGHTLY
Status: DISCONTINUED | OUTPATIENT
Start: 2025-07-21 | End: 2025-07-22 | Stop reason: HOSPADM

## 2025-07-21 RX ORDER — ESCITALOPRAM OXALATE 10 MG/1
10 TABLET ORAL DAILY
Status: DISCONTINUED | OUTPATIENT
Start: 2025-07-21 | End: 2025-07-22 | Stop reason: HOSPADM

## 2025-07-21 RX ORDER — ENOXAPARIN SODIUM 100 MG/ML
1 INJECTION SUBCUTANEOUS EVERY 12 HOURS SCHEDULED
Status: DISCONTINUED | OUTPATIENT
Start: 2025-07-21 | End: 2025-07-22 | Stop reason: HOSPADM

## 2025-07-21 RX ORDER — CALCIUM CARBONATE 500 MG/1
2 TABLET, CHEWABLE ORAL 2 TIMES DAILY PRN
Status: DISCONTINUED | OUTPATIENT
Start: 2025-07-21 | End: 2025-07-22 | Stop reason: HOSPADM

## 2025-07-21 RX ORDER — HYDROMORPHONE HYDROCHLORIDE 1 MG/ML
0.5 INJECTION, SOLUTION INTRAMUSCULAR; INTRAVENOUS; SUBCUTANEOUS ONCE
Refills: 0 | Status: COMPLETED | OUTPATIENT
Start: 2025-07-21 | End: 2025-07-21

## 2025-07-21 RX ORDER — ONDANSETRON 2 MG/ML
4 INJECTION INTRAMUSCULAR; INTRAVENOUS ONCE
Status: COMPLETED | OUTPATIENT
Start: 2025-07-21 | End: 2025-07-21

## 2025-07-21 RX ORDER — DICYCLOMINE HYDROCHLORIDE 10 MG/1
20 CAPSULE ORAL 4 TIMES DAILY
Status: DISCONTINUED | OUTPATIENT
Start: 2025-07-21 | End: 2025-07-22 | Stop reason: HOSPADM

## 2025-07-21 RX ORDER — ONDANSETRON 2 MG/ML
4 INJECTION INTRAMUSCULAR; INTRAVENOUS EVERY 6 HOURS PRN
Status: DISCONTINUED | OUTPATIENT
Start: 2025-07-21 | End: 2025-07-22 | Stop reason: HOSPADM

## 2025-07-21 RX ORDER — HYDROMORPHONE HYDROCHLORIDE 1 MG/ML
0.5 INJECTION, SOLUTION INTRAMUSCULAR; INTRAVENOUS; SUBCUTANEOUS
Refills: 0 | Status: DISCONTINUED | OUTPATIENT
Start: 2025-07-21 | End: 2025-07-22 | Stop reason: HOSPADM

## 2025-07-21 RX ORDER — SCOPOLAMINE 1 MG/3D
1 PATCH, EXTENDED RELEASE TRANSDERMAL CONTINUOUS
OUTPATIENT
Start: 2025-07-21 | End: 2025-07-24

## 2025-07-21 RX ORDER — METRONIDAZOLE 500 MG/100ML
500 INJECTION, SOLUTION INTRAVENOUS ONCE
OUTPATIENT
Start: 2025-07-21 | End: 2025-07-21

## 2025-07-21 RX ORDER — POTASSIUM CHLORIDE 7.45 MG/ML
10 INJECTION INTRAVENOUS
Status: DISPENSED | OUTPATIENT
Start: 2025-07-21 | End: 2025-07-21

## 2025-07-21 RX ORDER — MULTIVITAMIN WITH IRON
500 TABLET ORAL 3 TIMES WEEKLY
Status: DISCONTINUED | OUTPATIENT
Start: 2025-07-21 | End: 2025-07-22 | Stop reason: HOSPADM

## 2025-07-21 RX ORDER — OCTREOTIDE ACETATE 100 UG/ML
100 INJECTION, SOLUTION INTRAVENOUS; SUBCUTANEOUS 3 TIMES DAILY
Status: DISCONTINUED | OUTPATIENT
Start: 2025-07-21 | End: 2025-07-22 | Stop reason: HOSPADM

## 2025-07-21 RX ORDER — AZITHROMYCIN 250 MG/1
1000 TABLET, FILM COATED ORAL ONCE
Status: COMPLETED | OUTPATIENT
Start: 2025-07-21 | End: 2025-07-21

## 2025-07-21 RX ORDER — ONDANSETRON 4 MG/1
4 TABLET, ORALLY DISINTEGRATING ORAL EVERY 6 HOURS PRN
Status: DISCONTINUED | OUTPATIENT
Start: 2025-07-21 | End: 2025-07-22 | Stop reason: HOSPADM

## 2025-07-21 RX ORDER — OCTREOTIDE ACETATE 500 UG/ML
200 INJECTION, SOLUTION INTRAVENOUS; SUBCUTANEOUS ONCE
Status: COMPLETED | OUTPATIENT
Start: 2025-07-21 | End: 2025-07-21

## 2025-07-21 RX ORDER — SODIUM CHLORIDE AND POTASSIUM CHLORIDE 150; 900 MG/100ML; MG/100ML
100 INJECTION, SOLUTION INTRAVENOUS CONTINUOUS
Status: DISCONTINUED | OUTPATIENT
Start: 2025-07-21 | End: 2025-07-22 | Stop reason: HOSPADM

## 2025-07-21 RX ORDER — POTASSIUM CHLORIDE 1500 MG/1
40 TABLET, EXTENDED RELEASE ORAL ONCE
Status: DISCONTINUED | OUTPATIENT
Start: 2025-07-21 | End: 2025-07-22 | Stop reason: HOSPADM

## 2025-07-21 RX ORDER — GABAPENTIN 100 MG/1
600 CAPSULE ORAL ONCE
OUTPATIENT
Start: 2025-07-21 | End: 2025-07-21

## 2025-07-21 RX ORDER — DIPHENOXYLATE HYDROCHLORIDE AND ATROPINE SULFATE 2.5; .025 MG/1; MG/1
2 TABLET ORAL 4 TIMES DAILY
Status: DISCONTINUED | OUTPATIENT
Start: 2025-07-21 | End: 2025-07-22 | Stop reason: HOSPADM

## 2025-07-21 RX ORDER — SODIUM CHLORIDE 0.9 % (FLUSH) 0.9 %
10 SYRINGE (ML) INJECTION AS NEEDED
Status: DISCONTINUED | OUTPATIENT
Start: 2025-07-21 | End: 2025-07-22 | Stop reason: HOSPADM

## 2025-07-21 RX ORDER — SODIUM CHLORIDE 0.9 % (FLUSH) 0.9 %
10 SYRINGE (ML) INJECTION EVERY 12 HOURS SCHEDULED
Status: DISCONTINUED | OUTPATIENT
Start: 2025-07-21 | End: 2025-07-22 | Stop reason: HOSPADM

## 2025-07-21 RX ORDER — HYDROCORTISONE 25 MG/G
CREAM TOPICAL 2 TIMES DAILY
Status: DISCONTINUED | OUTPATIENT
Start: 2025-07-21 | End: 2025-07-22 | Stop reason: HOSPADM

## 2025-07-21 RX ORDER — LOPERAMIDE HYDROCHLORIDE 2 MG/1
2 CAPSULE ORAL 4 TIMES DAILY
Status: DISCONTINUED | OUTPATIENT
Start: 2025-07-21 | End: 2025-07-22 | Stop reason: HOSPADM

## 2025-07-21 RX ORDER — CLONAZEPAM 1 MG/1
1 TABLET ORAL 2 TIMES DAILY PRN
Status: DISCONTINUED | OUTPATIENT
Start: 2025-07-21 | End: 2025-07-22 | Stop reason: HOSPADM

## 2025-07-21 RX ORDER — FAMOTIDINE 20 MG/1
20 TABLET, FILM COATED ORAL 2 TIMES DAILY
Status: DISCONTINUED | OUTPATIENT
Start: 2025-07-21 | End: 2025-07-22 | Stop reason: HOSPADM

## 2025-07-21 RX ADMIN — POTASSIUM CHLORIDE AND SODIUM CHLORIDE 100 ML/HR: 900; 150 INJECTION, SOLUTION INTRAVENOUS at 12:07

## 2025-07-21 RX ADMIN — FAMOTIDINE 20 MG: 20 TABLET, FILM COATED ORAL at 10:53

## 2025-07-21 RX ADMIN — HYDROCORTISONE: 25 CREAM TOPICAL at 23:39

## 2025-07-21 RX ADMIN — HYDROMORPHONE HYDROCHLORIDE 0.5 MG: 1 INJECTION, SOLUTION INTRAMUSCULAR; INTRAVENOUS; SUBCUTANEOUS at 13:53

## 2025-07-21 RX ADMIN — DICYCLOMINE HYDROCHLORIDE 20 MG: 10 CAPSULE ORAL at 19:06

## 2025-07-21 RX ADMIN — LOPERAMIDE HYDROCHLORIDE 2 MG: 2 CAPSULE ORAL at 19:06

## 2025-07-21 RX ADMIN — ENOXAPARIN SODIUM 80 MG: 100 INJECTION SUBCUTANEOUS at 23:34

## 2025-07-21 RX ADMIN — DIPHENOXYLATE HYDROCHLORIDE AND ATROPINE SULFATE 2 TABLET: 2.5; .025 TABLET ORAL at 23:34

## 2025-07-21 RX ADMIN — ONDANSETRON 4 MG: 2 INJECTION INTRAMUSCULAR; INTRAVENOUS at 23:36

## 2025-07-21 RX ADMIN — OCTREOTIDE ACETATE 100 MCG: 100 INJECTION, SOLUTION INTRAVENOUS; SUBCUTANEOUS at 11:00

## 2025-07-21 RX ADMIN — Medication 500 MCG: at 13:48

## 2025-07-21 RX ADMIN — FAMOTIDINE 20 MG: 20 TABLET, FILM COATED ORAL at 23:34

## 2025-07-21 RX ADMIN — HYDROMORPHONE HYDROCHLORIDE 0.5 MG: 1 INJECTION, SOLUTION INTRAMUSCULAR; INTRAVENOUS; SUBCUTANEOUS at 10:48

## 2025-07-21 RX ADMIN — ONDANSETRON 4 MG: 2 INJECTION INTRAMUSCULAR; INTRAVENOUS at 17:22

## 2025-07-21 RX ADMIN — LOPERAMIDE HYDROCHLORIDE 2 MG: 2 CAPSULE ORAL at 13:48

## 2025-07-21 RX ADMIN — POTASSIUM CHLORIDE 10 MEQ: 7.46 INJECTION, SOLUTION INTRAVENOUS at 15:09

## 2025-07-21 RX ADMIN — AZITHROMYCIN 1000 MG: 250 TABLET, FILM COATED ORAL at 10:52

## 2025-07-21 RX ADMIN — DIPHENOXYLATE HYDROCHLORIDE AND ATROPINE SULFATE 2 TABLET: 2.5; .025 TABLET ORAL at 12:06

## 2025-07-21 RX ADMIN — ENOXAPARIN SODIUM 80 MG: 100 INJECTION SUBCUTANEOUS at 11:00

## 2025-07-21 RX ADMIN — METOPROLOL SUCCINATE 12.5 MG: 25 TABLET, EXTENDED RELEASE ORAL at 23:35

## 2025-07-21 RX ADMIN — OCTREOTIDE ACETATE 200 MCG: 500 INJECTION, SOLUTION INTRAVENOUS; SUBCUTANEOUS at 05:21

## 2025-07-21 RX ADMIN — POTASSIUM CHLORIDE 10 MEQ: 7.46 INJECTION, SOLUTION INTRAVENOUS at 12:29

## 2025-07-21 RX ADMIN — ONDANSETRON 4 MG: 2 INJECTION INTRAMUSCULAR; INTRAVENOUS at 11:09

## 2025-07-21 RX ADMIN — POTASSIUM CHLORIDE 10 MEQ: 7.46 INJECTION, SOLUTION INTRAVENOUS at 06:53

## 2025-07-21 RX ADMIN — HYDROMORPHONE HYDROCHLORIDE 0.5 MG: 1 INJECTION, SOLUTION INTRAMUSCULAR; INTRAVENOUS; SUBCUTANEOUS at 06:53

## 2025-07-21 RX ADMIN — DIPHENOXYLATE HYDROCHLORIDE AND ATROPINE SULFATE 2 TABLET: 2.5; .025 TABLET ORAL at 17:22

## 2025-07-21 RX ADMIN — POTASSIUM CHLORIDE 10 MEQ: 7.46 INJECTION, SOLUTION INTRAVENOUS at 10:48

## 2025-07-21 RX ADMIN — POTASSIUM CHLORIDE 10 MEQ: 7.46 INJECTION, SOLUTION INTRAVENOUS at 13:48

## 2025-07-21 RX ADMIN — OCTREOTIDE ACETATE 100 MCG: 100 INJECTION, SOLUTION INTRAVENOUS; SUBCUTANEOUS at 17:34

## 2025-07-21 RX ADMIN — SODIUM CHLORIDE, POTASSIUM CHLORIDE, SODIUM LACTATE AND CALCIUM CHLORIDE 1000 ML: 600; 310; 30; 20 INJECTION, SOLUTION INTRAVENOUS at 04:20

## 2025-07-21 RX ADMIN — HYDROMORPHONE HYDROCHLORIDE 0.5 MG: 1 INJECTION, SOLUTION INTRAMUSCULAR; INTRAVENOUS; SUBCUTANEOUS at 04:46

## 2025-07-21 RX ADMIN — DICYCLOMINE HYDROCHLORIDE 20 MG: 10 CAPSULE ORAL at 23:34

## 2025-07-21 RX ADMIN — LOPERAMIDE HYDROCHLORIDE 2 MG: 2 CAPSULE ORAL at 08:09

## 2025-07-21 RX ADMIN — ONDANSETRON 4 MG: 2 INJECTION INTRAMUSCULAR; INTRAVENOUS at 05:21

## 2025-07-21 RX ADMIN — DICYCLOMINE HYDROCHLORIDE 20 MG: 10 CAPSULE ORAL at 12:06

## 2025-07-21 RX ADMIN — HYDROMORPHONE HYDROCHLORIDE 0.5 MG: 1 INJECTION, SOLUTION INTRAMUSCULAR; INTRAVENOUS; SUBCUTANEOUS at 16:37

## 2025-07-21 RX ADMIN — HYDROMORPHONE HYDROCHLORIDE 0.5 MG: 1 INJECTION, SOLUTION INTRAMUSCULAR; INTRAVENOUS; SUBCUTANEOUS at 19:48

## 2025-07-21 RX ADMIN — LOPERAMIDE HYDROCHLORIDE 2 MG: 2 CAPSULE ORAL at 23:34

## 2025-07-21 RX ADMIN — ESCITALOPRAM 10 MG: 10 TABLET, FILM COATED ORAL at 10:53

## 2025-07-21 RX ADMIN — NYSTATIN 1 APPLICATION: 100000 OINTMENT TOPICAL at 16:37

## 2025-07-21 NOTE — ED NOTES
"Nursing report ED to floor  Carole Weaver  44 y.o.  female    HPI :  HPI  Stated Reason for Visit: Patient from home with report of diarrhea. She is a stage 4 colorectal cancer patient and is scheduled for surgery for permanent colostomy. She reports her diarrhea was better this morning but has now returned to all liquid and feels like it is burning her rectum.  History Obtained From: patient    Chief Complaint  Chief Complaint   Patient presents with    Rectal Pain       Admitting doctor:   Greg Arteaga MD    Admitting diagnosis:   The primary encounter diagnosis was Diarrhea, unspecified type. A diagnosis of Rectal pain was also pertinent to this visit.    Code status:   Current Code Status       Date Active Code Status Order ID Comments User Context       7/21/2025 0518 CPR (Attempt to Resuscitate) [id number]  Sheba Solis APRN ED        Question Answer    Code Status (Patient has no pulse and is not breathing) CPR (Attempt to Resuscitate)    Medical Interventions (Patient has pulse or is breathing) Full Support                    Allergies:   Patient has no known allergies.    Isolation:   Droplet, Contact Spore    Intake and Output  No intake or output data in the 24 hours ending 07/21/25 0531    Weight:       07/21/25  0326   Weight: 76.2 kg (168 lb)       Most recent vitals:   Vitals:    07/21/25 0326 07/21/25 0341   BP: 137/85 129/81   BP Location: Right arm Right arm   Patient Position: Sitting Lying   Pulse: 77 66   Resp: 18 16   Temp: 97.7 °F (36.5 °C)    TempSrc: Oral    SpO2: 98% 99%   Weight: 76.2 kg (168 lb)    Height: 167.6 cm (66\")        Active LDAs/IV Access:   Lines, Drains & Airways       Active LDAs       Name Placement date Placement time Site Days    Peripheral IV 07/21/25 0357 20 G Right Antecubital 07/21/25  0357  Antecubital  less than 1    Single Lumen Implantable Port 12/18/20 Right Chest 12/18/20  1200  Chest  1675                    Labs (abnormal labs have a star):   Labs " Reviewed   COMPREHENSIVE METABOLIC PANEL - Abnormal; Notable for the following components:       Result Value    Glucose 102 (*)     Potassium 3.0 (*)     AST (SGOT) 52 (*)     All other components within normal limits    Narrative:     GFR Categories in Chronic Kidney Disease (CKD)              GFR Category          GFR (mL/min/1.73)    Interpretation  G1                    90 or greater        Normal or high (1)  G2                    60-89                Mild decrease (1)  G3a                   45-59                Mild to moderate decrease  G3b                   30-44                Moderate to severe decrease  G4                    15-29                Severe decrease  G5                    14 or less           Kidney failure    (1)In the absence of evidence of kidney disease, neither GFR category G1 or G2 fulfill the criteria for CKD.    eGFR calculation 2021 CKD-EPI creatinine equation, which does not include race as a factor   MAGNESIUM - Normal   CBC WITH AUTO DIFFERENTIAL - Normal   GASTROINTESTINAL PANEL, PCR (PREFERRED) DOES NOT INCLUDE CDIFF   CLOSTRIDIOIDES DIFFICILE TOXIN    Narrative:     The following orders were created for panel order Clostridioides difficile Toxin - Stool, Per Rectum.  Procedure                               Abnormality         Status                     ---------                               -----------         ------                     Clostridioides difficile...[[id number]]                      In process                   Please view results for these tests on the individual orders.   CLOSTRIDIOIDES DIFFICILE TOXIN, PCR   BASIC METABOLIC PANEL   CBC (NO DIFF)   CBC AND DIFFERENTIAL    Narrative:     The following orders were created for panel order CBC & Differential.  Procedure                               Abnormality         Status                     ---------                               -----------         ------                     CBC Auto Differential[[id number]]         Normal              Final result                 Please view results for these tests on the individual orders.       EKG:   No orders to display       Meds given in ED:   Medications   potassium chloride (KLOR-CON M20) CR tablet 40 mEq (has no administration in time range)   sodium chloride 0.9 % flush 10 mL (has no administration in time range)   sodium chloride 0.9 % flush 10 mL (has no administration in time range)   sodium chloride 0.9 % infusion 40 mL (has no administration in time range)   acetaminophen (TYLENOL) tablet 650 mg (has no administration in time range)     Or   acetaminophen (TYLENOL) 160 MG/5ML oral solution 650 mg (has no administration in time range)     Or   acetaminophen (TYLENOL) suppository 650 mg (has no administration in time range)   ondansetron ODT (ZOFRAN-ODT) disintegrating tablet 4 mg (has no administration in time range)     Or   ondansetron (ZOFRAN) injection 4 mg (has no administration in time range)   calcium carbonate (TUMS) chewable tablet 500 mg (200 mg elemental) (has no administration in time range)   HYDROmorphone (DILAUDID) injection 0.5 mg (has no administration in time range)   Potassium Replacement - Follow Nurse / BPA Driven Protocol (has no administration in time range)   Magnesium Standard Dose Replacement - Follow Nurse / BPA Driven Protocol (has no administration in time range)   Phosphorus Replacement - Follow Nurse / BPA Driven Protocol (has no administration in time range)   Calcium Replacement - Follow Nurse / BPA Driven Protocol (has no administration in time range)   Potassium Replacement - Follow Nurse / BPA Driven Protocol (has no administration in time range)   loperamide (IMODIUM) capsule 2 mg (has no administration in time range)   lactated ringers bolus 1,000 mL (1,000 mL Intravenous New Bag 7/21/25 0420)   ondansetron (ZOFRAN) injection 4 mg (4 mg Intravenous Given 7/21/25 0988)   octreotide (sandoSTATIN) injection 200 mcg (200 mcg Intravenous Given  7/21/25 0521)   HYDROmorphone (DILAUDID) injection 0.5 mg (0.5 mg Intravenous Given 7/21/25 0446)       Imaging results:  No radiology results for the last day    Ambulatory status:   - up ad dmitri    Social issues:   Social History     Socioeconomic History    Marital status:      Spouse name: Justus    Number of children: 1    Years of education: College   Tobacco Use    Smoking status: Every Day     Current packs/day: 1.00     Average packs/day: 1 pack/day for 25.0 years (25.0 ttl pk-yrs)     Types: Cigarettes     Passive exposure: Current    Smokeless tobacco: Never    Tobacco comments:     1 PPD/caffeine use    Vaping Use    Vaping status: Former    Substances: Nicotine    Devices: Disposable    Passive vaping exposure: Yes   Substance and Sexual Activity    Alcohol use: Yes     Comment: RARELY    Drug use: Never    Sexual activity: Yes     Partners: Male     Comment: .       Peripheral Neurovascular  Peripheral Neurovascular (Adult)  Peripheral Neurovascular WDL: WDL, pulse assessment  Pulse Assessment: radial    Neuro Cognitive  Neuro Cognitive (Adult)  Cognitive/Neuro/Behavioral WDL: WDL, level of consciousness, orientation  Level of Consciousness: Alert  Orientation: oriented x 4    Learning  Learning Assessment  Learning Readiness and Ability: no barriers identified    Respiratory  Respiratory WDL  Respiratory WDL: WDL    Abdominal Pain       Pain Assessments  Pain (Adult)  (0-10) Pain Rating: Rest: 8  (0-10) Pain Rating: Activity: 8  Pain Location: rectal    NIH Stroke Scale       Carine Hung RN  07/21/25 05:31 EDT

## 2025-07-21 NOTE — NURSING NOTE
Patient complaining of pain at IV site due to Potassium replacement therapy. Offered to slow rate of potassium. Patient would like to stop infusion until discusses with MD. Stopped at this time.

## 2025-07-21 NOTE — PROGRESS NOTES
Patient Name: Carole Weaver  YOB: 1980  MRN: [id number]  Admission date: 7/21/2025  Reason for Encounter: MD Consult     [facility] Clinical Nutrition Assessment     Subjective    Subjective Information     43 yo female adm with rectal cancer adm with severe diarrhea and hypokalemia.  Plan for colostomy to improve QOL.     Objective   H&P and Current Problems      H&P  Past Medical History:   Diagnosis Date    Abdominopelvic abscess 08/12/2020    ADMITTED TO [facility]    Abnormal Pap smear of cervix 02/02/1998    JULIUS I    Abscess of abdominal wall 11/28/2012    SEEN AT [facility] ER    Anemia in pregnancy     Anxiety     Bilateral epiphora 04/2020    Bilateral hand swelling 05/02/2020    SEEN AT [facility] ER    Cervical lymphadenitis 06/06/2012    SEEN AT [facility] ER    Chemotherapy induced diarrhea 01/2021    Chemotherapy induced neutropenia 04/2020    Chemotherapy-induced nausea 02/2020    Chemotherapy-induced thrombocytopenia 05/2020    Chronic anticoagulation     Chronic diarrhea     Colon polyps     FOLLOWED BY DR. 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 ALEXANDRU HUNT    History of gestational diabetes     History of pre-eclampsia 1998    History of pre-eclampsia     History of radiation therapy 2019    FOLLOWED BY DR. JAVON LEWIS    History of TB skin testing 01/17/2009    TB Skin Test    History of TB skin testing 01/17/2009    TB Skin Test     History of transfusion 2019    HPV in female 1998    Hypokalemia 2019    Hypomagnesemia 2019    Hyponatremia 2021    IBS (irritable bowel syndrome)     Ileostomy present 2020    Take down on 11/3/2022    Infected insect bite of neck 2016    SEEN AT [facility]    Kidney stone on right side 10/07/2019    Kidney stones 2007    SEEN AT [facility] ER    Low anterior resection syndrome 2022    FOLLOWED BY DR. PADMAJA ESPARZA    Lump of right breast     SWOLLEN LYMPH NODE    Lung cancer 2020    METASTATIC LUNG CANCER    Macrocytosis 2021    Monopolar depression     On anticoagulant therapy     Pain associated with surgical procedure 2020    Palmar-plantar erythrodysesthesia 2021    Perirectal abscess 2020    Perirectal abscess 2020    Added automatically from request for surgery [id number]      Pulmonary embolism without acute cor pulmonale 09/20/2019    X 3; ADMITTED TO [facility]    Pulmonary nodule, right 2020    Rectal cancer 2019    STAGE IIA, INVASIVE MODERATELY DIFFERENTIATED ADENOCARCINOMA, CHEMO AND XRT FINISHED 2019    Right shoulder pain 2020    SEEN AT [facility] ER    Right ureteral stone 10/01/2019    SEEN AT [facility] ER    Right ureteral stone 10/01/2019    SEEN AT [facility] ER      SAB (spontaneous ) 1996     A2-1 INDUCED    Sciatica     Sepsis due to cellulitis 2002    LEFT GREAT TOE, ADMITTED TO [facility]    Tachycardia 2020    Thrombosis of superior vena cava 2020    AND BRACHIOCEPHALIC VEIN, ADMITTED TO [facility]    Urinary urgency 2020     Past Surgical History:   Procedure Laterality Date    ABDOMINAL SURGERY      CHOLECYSTECTOMY N/A 10/10/2003    LAPAROSCOPIC WITH CHOLANGIOGRAM, DR. JAMEY TALAVERA AT [facility]    COLON RESECTION N/A 2019    Procedure: laparoscopic low anterior colon resection with mobilization of splenic flexure and diverting loop ileostomy: ERAS;  Surgeon: Padmaja Esparza MD;  Location: [facility] OR;   Service: General    COLON RESECTION N/A 08/03/2020    Procedure: LOW ANTERIOR COLON RESECTION, COLOANAL ANASTOMOSIS, MOBILIZATION SPLENIC FLEXURE;  Surgeon: Padmaja Esparza MD;  Location: [facility] MAIN OR;  Service: General;  Laterality: N/A;    COLONOSCOPY N/A 07/15/2020    PATENT ANASTAMOSIS IN MID RECTUM, RESCOPE IN 1 YR, DR. PADMAJA ESPARZA AT [facility]    COLONOSCOPY N/A 11/03/2022    DIFFUSE AREA OF MODERATELY FRIABLE MUCOSA IN ENTIRE COLON , DIVERSION COLITIS, PATENT AND HEALTHY ANASTAMOSIS, DR. PADMAJA ESPARZA AT [facility]    COLONOSCOPY N/A 12/04/2023    6 MM SESSILE SERRATED ADENOMA POLYP IN DESCENDING, PATENT ANASTAMOSIS IN DISTAL RECTUM, RESCOPE IN 2 YRS, DR. PADMAJA ESPARZA AT [facility]    COLONOSCOPY N/A 06/04/2025    PATENT AND HEALTHY ANASTAMOSIS IN DISTAL RECTUM, RESCOPE IN 2 YRS, DR. PADMAJA ESPARZA AT [facility]    COLONOSCOPY W/ POLYPECTOMY N/A 07/08/2019    15 MM TUBULOVILLOUS ADENOMA POLYP IN HEPATIC FLEXURE, 20 MMTUBULOVILLOUS ADENOMA WITH HIGH GRADE DYSPLASIA IN RECTOSIGMOID, 4 CM MASS IN MID RECTUM, PATH: INVASIVE MODERATELY DIFFERENTIATED ADENOCARCINOMA, DR. JENNIFER LI AT [facility]    DILATATION AND EVACUATION N/A 2009    ENDOSCOPY N/A 07/08/2019    LA GRADE A ESOPHAGITIS, GASTRITIS, ALL BIOPSIES BENIGN, DR. JENNIFER LI AT [facility]    ILEOSTOMY CLOSURE N/A 11/14/2022    Procedure: ILEOSTOMY TAKEDOWN;  Surgeon: Padmaja Esparza MD;  Location: [facility] OR;  Service: General;  Laterality: N/A;    INCISION AND DRAINAGE PERIRECTAL ABSCESS N/A 08/14/2020    Procedure: INCISION AND DRAINAGE OF retrorectal dehiscence abcess with drain placement and irrigation;  Surgeon: Padmaja Esparza MD;  Location: [facility] MAIN OR;  Service: General;  Laterality: N/A;    PAP SMEAR N/A 01/24/2014    SIGMOIDOSCOPY N/A 07/24/2019    INFILTRATIVE PARTIALLY OBSTRUCTING LARGE RECTAL CANCER, AREA TATOOED, DR. PADMAJA ESPARZA AT [facility]    SIGMOIDOSCOPY N/A 11/23/2019    AN ULCERATED PARTIALLY OBSTRUCTING MASS IN MID RECTUM, AREA  PAULO, DR. GERONIMO ESPARZA AT [facility]    SIGMOIDOSCOPY N/A 07/22/2021    PATENT ANASTAMOSIS IN DISTAL RECTUM, RESCOPE IN 1 YR, DR. GERONIMO ESPARZA AT [facility]    SIGMOIDOSCOPY N/A 09/11/2024    PATCHY INFLAMMATION AND EDEMA IN DESCENDING, AREA BIOPSIED AND WAS BENIGN, PATENT AND HEALTHY ANASTAMOSIS, HEMORRHOIDS,RESCOPE(CY) 12/2025, DR. GERONIMO ESPARZA AT [facility]    TRANSRECTAL ULTRASOUND N/A 07/24/2019    Procedure: ULTRASOUND TRANSRECTAL;  Surgeon: Geronimo Esparza MD;  Location: [facility] ENDOSCOPY;  Service: General    URETEROSCOPY LASER LITHOTRIPSY WITH STENT INSERTION Right 10/30/2019    DR. ESTUARDO BEASLEY AT [place]    VAGINAL DELIVERY N/A 09/18/1998    VENOUS ACCESS DEVICE (PORT) INSERTION Left 08/09/2019    Procedure: INSERTION VENOUS ACCESS DEVICE;  Surgeon: Sj Joseph MD;  Location:  [facility];  Service: General    VENOUS ACCESS DEVICE (PORT) INSERTION N/A 12/18/2020    Procedure: INSERTION VENOUS ACCESS DEVICE right side, removal venous access device left side;  Surgeon: Sj Joseph MD;  Location: [facility] OR [facility];  Service: General;  Laterality: N/A;    WISDOM TOOTH EXTRACTION Bilateral 1993        Current Problems   Admission Diagnosis:  Diarrhea [R19.7]  Rectal pain [K62.89]  Diarrhea, unspecified type [R19.7]    Problem List:    Diarrhea       Food Allergies  and Intolerances   NKFA   Applicable Nutrition History Patient reports she cannot even drink water without abdominal pain and diarrhea.  Currently on IV Octretide, h/o questran tx     Physical Findings      Chewing/Swallowing    No issues identified at this time   Dentition Mouth/Teeth WDL: WDL       Skin      Intact   Bowel function Last Bowel Movement: 07/21/25 (07/21/25 0652)               Diarrhea   Edema    None noted        Intake & Output (last day)         07/20 0701 07/21 0700 07/21 0701  07/22 0700    IV Piggyback  100    Total Intake(mL/kg)  100 (1.3)    Net  +100                   Labs      Results from last 7 days   Lab Units 07/21/25  0553  "07/20/25  0007   SODIUM mmol/L 142 142   POTASSIUM mmol/L 3.0* 3.3*   GLUCOSE mg/dL 102* 71   BUN mg/dL 7.0 8.0   CREATININE mg/dL 0.65 0.66   CALCIUM mg/dL 8.8 9.0   MAGNESIUM mg/dL 2.0 2.1   ALBUMIN g/dL 3.7 4.0   BILIRUBIN mg/dL 0.3 0.2   ALK PHOS U/L 100 90   AST (SGOT) U/L 52* 21   ALT (SGPT) U/L 32 13     Results from last 7 days   Lab Units 07/21/25  0422 07/20/25  0007   PLATELETS 10*3/mm3 193 209   HEMOGLOBIN g/dL 13.4 14.2   HEMATOCRIT % 40.3 42.6     No results found for: \"HGBA1C\"         Medications       Scheduled Medications dicyclomine, 20 mg, Oral, 4x Daily  diphenoxylate-atropine, 2 tablet, Oral, 4x Daily  enoxaparin sodium, 1 mg/kg, Subcutaneous, Q12H  escitalopram, 10 mg, Oral, Daily  famotidine, 20 mg, Oral, BID  loperamide, 2 mg, Oral, 4x Daily  metoprolol succinate XL, 12.5 mg, Oral, Nightly  octreotide, 100 mcg, Intravenous, TID  potassium chloride, 40 mEq, Oral, Once  potassium chloride, 10 mEq, Intravenous, Q1H  sodium chloride, 10 mL, Intravenous, Q12H  vitamin B-12, 500 mcg, Oral, Once per day on Monday Wednesday Friday        Infusions sodium chloride 0.9 % with KCl 20 mEq, 100 mL/hr, Last Rate: 100 mL/hr (07/21/25 1207)        PRN Medications   acetaminophen **OR** acetaminophen **OR** acetaminophen    calcium carbonate    Calcium Replacement - Follow Nurse / BPA Driven Protocol    clonazePAM    HYDROcodone-acetaminophen    HYDROmorphone    Magnesium Standard Dose Replacement - Follow Nurse / BPA Driven Protocol    ondansetron ODT **OR** ondansetron    Phosphorus Replacement - Follow Nurse / BPA Driven Protocol    Potassium Replacement - Follow Nurse / BPA Driven Protocol    sodium chloride    sodium chloride     Anthropometrics       Height: 167.6 cm (65.98\")  Weight: 78.1 kg (172 lb 2.9 oz) (07/21/25 0655)  Weight Method: Standing scale  BMI (Calculated): 27.8       Trending Weight Changes 07/21/25 weight loss of 17 lbs (9%) over 6 month(s)    Significant?  No        Weight History  Wt " Readings from Last 10 Encounters:   07/21/25 78.1 kg (172 lb 2.9 oz)   07/19/25 79.8 kg (176 lb)   07/18/25 79.8 kg (176 lb)   06/25/25 80 kg (176 lb 6.4 oz)   05/21/25 80.2 kg (176 lb 14.4 oz)   05/02/25 79.8 kg (176 lb)   04/23/25 76.8 kg (169 lb 4.8 oz)   04/09/25 81.3 kg (179 lb 4.8 oz)   03/21/25 81.7 kg (180 lb 3.2 oz)   03/19/25 81.6 kg (180 lb)            Nutrition Focused Physical Exam     Trending NFPE 07/21/25 Pt does not meet criteria for malnutrition diagnosis, at this time.       Malnutrition Severity Assessment              Estimated Needs      Date assessed 07/21/25   Weight(s) Used  78.1 kg       Energy Requirements    Method for Estimation  25-30 kcals/kg   Daily Needs (kcal/day) 3042-0652   Protein Requirements    Method for Estimation 1.2-1.5 gm/kg   Daily Needs (g/day)    Fluid Requirements     Daily Needs (mL/day) 1950    Method for Estimation 25 mL/kg     Current Nutrition Orders & Evaluation of Intake      Oral Nutrition     Current PO Diet Diet: Regular/House; Fluid Consistency: Thin (IDDSI 0)   Oral Nutrition Supplement None     Trending % PO Intake 0 - pt refused breakfast & lunch and plans to eat very little at dinner       Assessment & Plan   Nutrition Diagnosis and Goals       Nutrition Diagnosis 1   Altered GI Function  r/t cancer tx AEB severe diarrhea            Goal(s) Tolerates PO Diet  and Meets Estimated Needs  and No Significant Weight Loss      Nutrition Intervention and Prescription       Intervention  Provide patient with menu, Advised alternate menu selections, Advised available snacks, Provide Education, Continue with current interventions, and Completed NFPE      Diet Prescription Regular diet as tolerated    Supplement Prescription N/a    Education Provided       Enteral Recommendation --       TPN Recommendation Consider TPN of 100 ml/hr  100 gm AA  1100 kcal dextrose  250 ml 20% lipids daily  2000 kcal/day => 26 kcal/kg       Monitoring/Evaluation        Monitor/Evaluation Weight, GI Status, and Symptoms          Electronically signed by:  Ray Anderson RD  07/21/25 13:01 EDT

## 2025-07-21 NOTE — PLAN OF CARE
Goal Outcome Evaluation:              Outcome Evaluation: Patient resting in bed. Loose stools have slightly decreased this afternoon. Cream applied to rectal area by patient due to skin excoriation. Mother remains at bedside.

## 2025-07-21 NOTE — ED PROVIDER NOTES
I have personally performed a face-to-face diagnostic evaluation of the patient.  I have reviewed and agree with the care plan as outlined by NP/PA.  My findings are as follows:    HPI:  Patient is a 44 y.o. female who presents with continued intractable diarrhea and rectal pain      PE:  Physical Exam  Constitutional:       Appearance: Normal appearance.   HENT:      Head: Normocephalic and atraumatic.   Eyes:      Conjunctiva/sclera: Conjunctivae normal.   Pulmonary:      Effort: Pulmonary effort is normal. No respiratory distress.   Neurological:      Mental Status: She is alert and oriented to person, place, and time.           MDM:  I provided a substantiate portion of the care of this patient. I personally performed the medical decision making.    Medical records are reviewed in [facility] Everywhere, if applicable      Recent Results (from the past 24 hours)   Comprehensive Metabolic Panel    Collection Time: 07/21/25  4:22 AM    Specimen: Blood   Result Value Ref Range    Glucose 102 (H) 65 - 99 mg/dL    BUN 7.0 6.0 - 20.0 mg/dL    Creatinine 0.65 0.57 - 1.00 mg/dL    Sodium 142 136 - 145 mmol/L    Potassium 3.0 (L) 3.5 - 5.2 mmol/L    Chloride 107 98 - 107 mmol/L    CO2 23.0 22.0 - 29.0 mmol/L    Calcium 8.8 8.6 - 10.5 mg/dL    Total Protein 6.4 6.0 - 8.5 g/dL    Albumin 3.7 3.5 - 5.2 g/dL    ALT (SGPT) 32 1 - 33 U/L    AST (SGOT) 52 (H) 1 - 32 U/L    Alkaline Phosphatase 100 39 - 117 U/L    Total Bilirubin 0.3 0.0 - 1.2 mg/dL    Globulin 2.7 gm/dL    A/G Ratio 1.4 g/dL    BUN/Creatinine Ratio 10.8 7.0 - 25.0    Anion Gap 12.0 5.0 - 15.0 mmol/L    eGFR 110.8 >60.0 mL/min/1.73   Magnesium    Collection Time: 07/21/25  4:22 AM    Specimen: Blood   Result Value Ref Range    Magnesium 2.0 1.6 - 2.6 mg/dL   CBC Auto Differential    Collection Time: 07/21/25  4:22 AM    Specimen: Blood   Result Value Ref Range    WBC 4.63 3.40 - 10.80 10*3/mm3    RBC 4.16 3.77 - 5.28 10*6/mm3    Hemoglobin 13.4 12.0 - 15.9  g/dL    Hematocrit 40.3 34.0 - 46.6 %    MCV 96.9 79.0 - 97.0 fL    MCH 32.2 26.6 - 33.0 pg    MCHC 33.3 31.5 - 35.7 g/dL    RDW 12.6 12.3 - 15.4 %    RDW-SD 44.5 37.0 - 54.0 fl    MPV 10.2 6.0 - 12.0 fL    Platelets 193 140 - 450 10*3/mm3    Neutrophil % 62.8 42.7 - 76.0 %    Lymphocyte % 23.5 19.6 - 45.3 %    Monocyte % 9.9 5.0 - 12.0 %    Eosinophil % 3.2 0.3 - 6.2 %    Basophil % 0.4 0.0 - 1.5 %    Immature Grans % 0.2 0.0 - 0.5 %    Neutrophils, Absolute 2.90 1.70 - 7.00 10*3/mm3    Lymphocytes, Absolute 1.09 0.70 - 3.10 10*3/mm3    Monocytes, Absolute 0.46 0.10 - 0.90 10*3/mm3    Eosinophils, Absolute 0.15 0.00 - 0.40 10*3/mm3    Basophils, Absolute 0.02 0.00 - 0.20 10*3/mm3    Immature Grans, Absolute 0.01 0.00 - 0.05 10*3/mm3    nRBC 0.0 0.0 - 0.2 /100 WBC     I have reviewed the above labs    This is a chronically ill-appearing 44-year-old female who evaluated last night for similar symptoms.  She is returning for intractable diarrhea and rectal pain again tonight.  Again, this is in the setting of rectal adenocarcinoma and side effects of chemotherapy.    She presents afebrile with overall benign vital signs.  She is nontender to the abdomen.    She was reevaluated with labs, which do demonstrate decreasing potassium, which is likely secondary to GI loss.  Again, I consider cross-sectional imaging, but we are going to defer in the absence of any acute abdominal tenderness to palpation today.    We will plan on admission due to intractable symptoms.    Impression:  Final diagnoses:   Diarrhea, unspecified type   Rectal pain         Disposition:  ED Disposition       ED Disposition   Decision to Admit    Condition   --    Comment   Level of Care: Med/Surg [1]   Diagnosis: Diarrhea [787.91.ICD-9-CM]   Admitting Physician: BRANDON BOGGS [[id number]]   Attending Physician: BRANDON BOGGS [[id number]]   Bed Request Comments: oncology patient   Is patient appropriate for Inpatient Observation Unit?: No [0]                   Ami Uribe MD  07/21/25 0518

## 2025-07-21 NOTE — NURSING NOTE
Patient discussed potassium with MD and agreeable to restart replacement. Potassium started at this time. Patient also has infusaport and we discussed accessing port to use. Patient agreeable to discuss again if infusion  causes discomfort.

## 2025-07-21 NOTE — ED PROVIDER NOTES
EMERGENCY DEPARTMENT ENCOUNTER  Room Number:  P388/1  PCP: Deepika Vela III, NP-C  Independent Historians: Historian: Patient      HPI:  Chief Complaint: had concerns including Rectal Pain.     A complete HPI/ROS/PMH/PSH/SH/FH are unobtainable due to: EM_Unobtainable History : None    Chronic or social conditions impacting patient care (Social Determinants of Health): None      Context: Carole Weaver is a 44 y.o. female with a medical history of stage IV adenocarcinoma of the rectum, factor V Leiden, and hypertension the emergency department today with severe diarrhea.  Patient has known chemo induced diarrhea and is scheduled to have a colostomy placed in about a week with Dr. Ye.  She has been having this treated outpatient at the oncology office with IV octreotide.  She was seen here last night and felt better after the octreotide and went home and says she was having a pretty good day and around 7 PM the diarrhea started again and has not stopped.  She says it is now burning in her rectum.  She denies abdominal pain.  She reports nausea but no vomiting.  She denies fever or chills.      Review of prior external notes (non-ED) -and- Review of prior external test results outside of this encounter:   I reviewed labs from yesterday, hemoglobin 14.2, potassium 3.3      PAST MEDICAL HISTORY  Active Ambulatory Problems     Diagnosis Date Noted    Anxiety 08/09/2016    Irritable bowel syndrome 08/09/2016    Monopolar depression 05/09/2019    Adenocarcinoma of rectum 07/12/2019    Family history of factor V Leiden mutation 08/01/2019    Heterozygous factor V Leiden mutation 08/02/2019    Encounter for fitting and adjustment of vascular catheter 08/07/2019    Functional diarrhea 09/03/2019    Adverse effect of antineoplastic and immunosuppressive drugs, subsequent encounter 09/03/2019    Hypokalemia 09/03/2019    Hypomagnesemia 09/03/2019    Other pulmonary embolism without acute cor pulmonale  09/20/2019    Hydronephrosis with ureteropelvic junction (UPJ) obstruction 10/15/2019    Partial intestinal obstruction, unspecified as to cause 07/08/2019    Irregular heart beat 12/10/2019    Gastrointestinal hemorrhage, unspecified 07/08/2019    Esophagitis 07/08/2019    Family history of colonic polyps 12/10/2019    Chronic diarrhea 12/10/2019    Calculus of gallbladder 12/10/2019    Benign neoplasm of transverse colon 07/08/2019    Benign neoplasm of rectum and anal canal 07/08/2019    Heart murmur 12/10/2019    Anemia 01/22/2020    Anticoagulation adequate 01/22/2020    Iron deficiency 02/05/2020    Adverse effect of iron 02/05/2020    Drug-induced peripheral neuropathy 04/15/2020    On antineoplastic chemotherapy 04/25/2020    Chemotherapy-induced thrombocytopenia 04/25/2020    HTN (hypertension) 04/25/2020    Chronic anticoagulation 04/25/2020    Chemotherapy-induced neutropenia 04/29/2020    Hand foot syndrome 05/13/2020    Secondary cancer of lung 10/05/2020    Leukocytopenia 11/10/2020    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    Tachycardia 12/23/2020    Macrocytosis without anemia 06/22/2021    Pulmonary embolism without acute cor pulmonale 09/20/2019    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    On anticoagulant therapy     Lump of right breast     Kidney stones 08/09/2007    HPV in female 1998    History of radiation therapy 2019    History of gestational diabetes     History of chemotherapy 2019    History of anemia     Hemorrhoids     GERD (gastroesophageal reflux disease)     Fistula of intestine     Colon polyps     Chemotherapy induced diarrhea 01/2021    Cervical lymphadenitis 06/06/2012    Bilateral epiphora 04/2020    Family history of colon cancer 10/05/2022    Partial small bowel obstruction 07/22/2023    History of pulmonary embolism 11/18/2023    Coronary artery calcification 07/08/2024    Tobacco abuse 07/08/2024    Encounter for long-term (current) use of high-risk medication  2024    Dehydration 2025    Mixed hyperlipidemia 2025    History of rectal cancer 2025    Intractable diarrhea 2025    Full incontinence of feces 2025     Resolved Ambulatory Problems     Diagnosis Date Noted    Immunization due 2018    UTI (urinary tract infection) 2019    Kidney stone on right side 10/07/2019    Rectal cancer 2019    Hematochezia 12/10/2019    Loss of weight 12/10/2019    Pain associated with surgical procedure 2020    Ileostomy present 2020    Pulmonary nodule, right 2020    Rectal cancer 2020    Rectal cancer 2020    Abdominopelvic abscess 2020    Perirectal abscess 2020    Pulmonary nodules 2020    Hyponatremia 2020    Malignant neoplasm of colon 01/15/2021    Dysuria 2021    History of rectal cancer 2021    Urinary urgency 2020    Sepsis due to cellulitis 2002    Sciatica     SAB (spontaneous ) 1996    Right ureteral stone 10/01/2019    Right shoulder pain 2020    Rectal cancer 2019    Macrocytosis 2021    Lung cancer 2020    Infected insect bite of neck 2016    Ileostomy in place     IBS (irritable bowel syndrome)     History of TB skin testing 2009    History of pre-eclampsia     Factor V Leiden mutation     Depression     Cystitis 2020    Cystitis 10/27/2020    Chemotherapy-induced nausea 2020    Chemotherapy induced neutropenia 2020    Bilateral hand swelling 2020    Anemia in pregnancy     Abnormal Pap smear of cervix 1998    Neck pain on left side 2023     Past Medical History:   Diagnosis Date    Abscess of abdominal wall 2012    COVID-19 2022    Diversion colitis 2022    Gallstones     Hearing loss     History of transfusion 2019    Low anterior resection syndrome 2022         PAST SURGICAL HISTORY  Past Surgical History:   Procedure Laterality Date    ABDOMINAL  SURGERY      CHOLECYSTECTOMY N/A 10/10/2003    LAPAROSCOPIC WITH CHOLANGIOGRAM, DR. JAMEY TALAVERA AT [facility]    COLON RESECTION N/A 12/02/2019    Procedure: laparoscopic low anterior colon resection with mobilization of splenic flexure and diverting loop ileostomy: ERAS;  Surgeon: Geronimo Esparza MD;  Location: [facility];  Service: General    COLON RESECTION N/A 08/03/2020    Procedure: LOW ANTERIOR COLON RESECTION, COLOANAL ANASTOMOSIS, MOBILIZATION SPLENIC FLEXURE;  Surgeon: Geronimo Esparza MD;  Location: [facility] OR;  Service: General;  Laterality: N/A;    COLONOSCOPY N/A 07/15/2020    PATENT ANASTAMOSIS IN MID RECTUM, RESCOPE IN 1 YR, DR. GERONIMO ESPARZA AT [facility]    COLONOSCOPY N/A 11/03/2022    DIFFUSE AREA OF MODERATELY FRIABLE MUCOSA IN ENTIRE COLON , DIVERSION COLITIS, PATENT AND HEALTHY ANASTAMOSIS, DR. GERONIMO ESPARZA AT [facility]    COLONOSCOPY N/A 12/04/2023    6 MM SESSILE SERRATED ADENOMA POLYP IN DESCENDING, PATENT ANASTAMOSIS IN DISTAL RECTUM, RESCOPE IN 2 YRS, DR. GERONIMO ESPARZA AT [facility]    COLONOSCOPY N/A 06/04/2025    PATENT AND HEALTHY ANASTAMOSIS IN DISTAL RECTUM, RESCOPE IN 2 YRS, DR. GERONIMO ESPARZA AT [facility]    COLONOSCOPY W/ POLYPECTOMY N/A 07/08/2019    15 MM TUBULOVILLOUS ADENOMA POLYP IN HEPATIC FLEXURE, 20 MMTUBULOVILLOUS ADENOMA WITH HIGH GRADE DYSPLASIA IN RECTOSIGMOID, 4 CM MASS IN MID RECTUM, PATH: INVASIVE MODERATELY DIFFERENTIATED ADENOCARCINOMA, DR. JENNIFER LI AT [facility]    DILATATION AND EVACUATION N/A 2009    ENDOSCOPY N/A 07/08/2019    LA GRADE A ESOPHAGITIS, GASTRITIS, ALL BIOPSIES BENIGN, DR. JENNIFER LI AT [facility]    ILEOSTOMY CLOSURE N/A 11/14/2022    Procedure: ILEOSTOMY TAKEDOWN;  Surgeon: Geronimo Esparza MD;  Location: [facility];  Service: General;  Laterality: N/A;    INCISION AND DRAINAGE PERIRECTAL ABSCESS N/A 08/14/2020    Procedure: INCISION AND DRAINAGE OF retrorectal dehiscence abcess with drain placement and irrigation;  Surgeon: Ephraim  Geronimo MASSEY MD;  Location: [facility] MAIN OR;  Service: General;  Laterality: N/A;    PAP SMEAR N/A 01/24/2014    SIGMOIDOSCOPY N/A 07/24/2019    INFILTRATIVE PARTIALLY OBSTRUCTING LARGE RECTAL CANCER, AREA TATOOED, DR. GERONIMO YE AT [facility]    SIGMOIDOSCOPY N/A 11/23/2019    AN ULCERATED PARTIALLY OBSTRUCTING MASS IN MID RECTUM, AREA TATTOOED, DR. GERONIMO YE AT [facility]    SIGMOIDOSCOPY N/A 07/22/2021    PATENT ANASTAMOSIS IN DISTAL RECTUM, RESCOPE IN 1 YR, DR. GERONIMO YE AT [facility]    SIGMOIDOSCOPY N/A 09/11/2024    PATCHY INFLAMMATION AND EDEMA IN DESCENDING, AREA BIOPSIED AND WAS BENIGN, PATENT AND HEALTHY ANASTAMOSIS, HEMORRHOIDS,RESCOPE(CY) 12/2025, DR. GERONIMO YE AT [facility]    TRANSRECTAL ULTRASOUND N/A 07/24/2019    Procedure: ULTRASOUND TRANSRECTAL;  Surgeon: Geronimo Ye MD;  Location: [facility] ENDOSCOPY;  Service: General    URETEROSCOPY LASER LITHOTRIPSY WITH STENT INSERTION Right 10/30/2019    DR. ESTUARDO BEASLEY AT [place]    VAGINAL DELIVERY N/A 09/18/1998    VENOUS ACCESS DEVICE (PORT) INSERTION Left 08/09/2019    Procedure: INSERTION VENOUS ACCESS DEVICE;  Surgeon: Sj Joseph MD;  Location: [facility] OR [facility];  Service: General    VENOUS ACCESS DEVICE (PORT) INSERTION N/A 12/18/2020    Procedure: INSERTION VENOUS ACCESS DEVICE right side, removal venous access device left side;  Surgeon: Sj Joseph MD;  Location:  [facility];  Service: General;  Laterality: N/A;    WISDOM TOOTH EXTRACTION Bilateral 1993         FAMILY HISTORY  Family History   Problem Relation Age of Onset    Hyperlipidemia Mother     Colon polyps Mother     Heart disease Mother     Hypertension Mother     Factor V Leiden deficiency Mother     Skin cancer Father         Squamous in 60s    Atrial fibrillation Father     Drug abuse Sister     Mental illness Sister         Addiction    No Known Problems Son     Colon cancer Maternal Uncle     Cancer Paternal Uncle     Colon cancer Paternal Uncle     Diabetes Paternal Uncle     Heart  disease Paternal Grandfather     Cancer Paternal Aunt         OVARIAN    Malig Hyperthermia Neg Hx          SOCIAL HISTORY  Social History     Socioeconomic History    Marital status:      Spouse name: Justus    Number of children: 1    Years of education: College   Tobacco Use    Smoking status: Every Day     Current packs/day: 1.00     Average packs/day: 1 pack/day for 25.0 years (25.0 ttl pk-yrs)     Types: Cigarettes     Passive exposure: Current    Smokeless tobacco: Never    Tobacco comments:     1 PPD/caffeine use    Vaping Use    Vaping status: Former    Substances: Nicotine    Devices: Disposable    Passive vaping exposure: Yes   Substance and Sexual Activity    Alcohol use: Yes     Comment: RARELY    Drug use: Never    Sexual activity: Yes     Partners: Male     Comment: .         ALLERGIES  Patient has no known allergies.      REVIEW OF SYSTEMS  Included in HPI  All systems reviewed and negative except for those discussed in HPI.      PHYSICAL EXAM    I have reviewed the triage vital signs and nursing notes.    ED Triage Vitals   Temp Heart Rate Resp BP SpO2   07/21/25 0326 07/21/25 0326 07/21/25 0326 07/21/25 0326 07/21/25 0326   97.7 °F (36.5 °C) 77 18 137/85 98 %      Temp src Heart Rate Source Patient Position BP Location FiO2 (%)   07/21/25 0326 07/21/25 0341 07/21/25 0326 07/21/25 0326 --   Oral Monitor Sitting Right arm        Physical Exam  Constitutional:       General: She is not in acute distress.     Appearance: Normal appearance.   HENT:      Head: Normocephalic and atraumatic.      Nose: Nose normal.      Mouth/Throat:      Mouth: Mucous membranes are moist.   Eyes:      Extraocular Movements: Extraocular movements intact.      Pupils: Pupils are equal, round, and reactive to light.   Cardiovascular:      Rate and Rhythm: Normal rate and regular rhythm.      Pulses: Normal pulses.      Heart sounds: Normal heart sounds.   Pulmonary:      Effort: Pulmonary effort is normal. No  respiratory distress.      Breath sounds: Normal breath sounds.   Abdominal:      General: Abdomen is flat. There is no distension.      Palpations: Abdomen is soft.      Tenderness: There is no abdominal tenderness. There is no guarding or rebound.   Musculoskeletal:         General: Normal range of motion.      Cervical back: Normal range of motion and neck supple.   Skin:     General: Skin is warm and dry.      Capillary Refill: Capillary refill takes less than 2 seconds.   Neurological:      General: No focal deficit present.      Mental Status: She is alert and oriented to person, place, and time.   Psychiatric:         Mood and Affect: Mood normal.         Behavior: Behavior normal.         LAB RESULTS  Recent Results (from the past 24 hours)   Comprehensive Metabolic Panel    Collection Time: 07/21/25  4:22 AM    Specimen: Blood   Result Value Ref Range    Glucose 102 (H) 65 - 99 mg/dL    BUN 7.0 6.0 - 20.0 mg/dL    Creatinine 0.65 0.57 - 1.00 mg/dL    Sodium 142 136 - 145 mmol/L    Potassium 3.0 (L) 3.5 - 5.2 mmol/L    Chloride 107 98 - 107 mmol/L    CO2 23.0 22.0 - 29.0 mmol/L    Calcium 8.8 8.6 - 10.5 mg/dL    Total Protein 6.4 6.0 - 8.5 g/dL    Albumin 3.7 3.5 - 5.2 g/dL    ALT (SGPT) 32 1 - 33 U/L    AST (SGOT) 52 (H) 1 - 32 U/L    Alkaline Phosphatase 100 39 - 117 U/L    Total Bilirubin 0.3 0.0 - 1.2 mg/dL    Globulin 2.7 gm/dL    A/G Ratio 1.4 g/dL    BUN/Creatinine Ratio 10.8 7.0 - 25.0    Anion Gap 12.0 5.0 - 15.0 mmol/L    eGFR 110.8 >60.0 mL/min/1.73   Magnesium    Collection Time: 07/21/25  4:22 AM    Specimen: Blood   Result Value Ref Range    Magnesium 2.0 1.6 - 2.6 mg/dL   CBC Auto Differential    Collection Time: 07/21/25  4:22 AM    Specimen: Blood   Result Value Ref Range    WBC 4.63 3.40 - 10.80 10*3/mm3    RBC 4.16 3.77 - 5.28 10*6/mm3    Hemoglobin 13.4 12.0 - 15.9 g/dL    Hematocrit 40.3 34.0 - 46.6 %    MCV 96.9 79.0 - 97.0 fL    MCH 32.2 26.6 - 33.0 pg    MCHC 33.3 31.5 - 35.7 g/dL     RDW 12.6 12.3 - 15.4 %    RDW-SD 44.5 37.0 - 54.0 fl    MPV 10.2 6.0 - 12.0 fL    Platelets 193 140 - 450 10*3/mm3    Neutrophil % 62.8 42.7 - 76.0 %    Lymphocyte % 23.5 19.6 - 45.3 %    Monocyte % 9.9 5.0 - 12.0 %    Eosinophil % 3.2 0.3 - 6.2 %    Basophil % 0.4 0.0 - 1.5 %    Immature Grans % 0.2 0.0 - 0.5 %    Neutrophils, Absolute 2.90 1.70 - 7.00 10*3/mm3    Lymphocytes, Absolute 1.09 0.70 - 3.10 10*3/mm3    Monocytes, Absolute 0.46 0.10 - 0.90 10*3/mm3    Eosinophils, Absolute 0.15 0.00 - 0.40 10*3/mm3    Basophils, Absolute 0.02 0.00 - 0.20 10*3/mm3    Immature Grans, Absolute 0.01 0.00 - 0.05 10*3/mm3    nRBC 0.0 0.0 - 0.2 /100 WBC   Gastrointestinal Panel, PCR - Stool, Per Rectum    Collection Time: 07/21/25  4:50 AM    Specimen: Per Rectum; Stool   Result Value Ref Range    Campylobacter Not Detected Not Detected    Plesiomonas shigelloides Not Detected Not Detected    Salmonella Not Detected Not Detected    Vibrio Not Detected Not Detected    Vibrio cholerae Not Detected Not Detected    Yersinia enterocolitica Not Detected Not Detected    Enteroaggregative E. coli (EAEC) Not Detected Not Detected    Enteropathogenic E. coli (EPEC) Detected (A) Not Detected    Enterotoxigenic E. coli (ETEC) lt/st Not Detected Not Detected    Shiga-like toxin-producing E. coli (STEC) stx1/stx2 Not Detected Not Detected    Shigella/Enteroinvasive E. coli (EIEC) Not Detected Not Detected    Cryptosporidium Not Detected Not Detected    Cyclospora cayetanensis Not Detected Not Detected    Entamoeba histolytica Not Detected Not Detected    Giardia lamblia Not Detected Not Detected    Adenovirus F40/41 Not Detected Not Detected    Astrovirus Not Detected Not Detected    Norovirus GI/GII Not Detected Not Detected    Rotavirus A Not Detected Not Detected    Sapovirus (I, II, IV or V) Not Detected Not Detected   Clostridioides difficile Toxin, PCR - Stool, Per Rectum    Collection Time: 07/21/25  4:50 AM    Specimen: Per Rectum;  Stool   Result Value Ref Range    Toxigenic C. difficile by PCR Negative Negative         MEDICATIONS GIVEN IN ER  Medications   potassium chloride (KLOR-CON M20) CR tablet 40 mEq (40 mEq Oral Not Given 7/21/25 0613)   sodium chloride 0.9 % flush 10 mL (10 mL Intravenous Not Given 7/21/25 0835)   sodium chloride 0.9 % flush 10 mL (has no administration in time range)   sodium chloride 0.9 % infusion 40 mL (has no administration in time range)   acetaminophen (TYLENOL) tablet 650 mg (has no administration in time range)     Or   acetaminophen (TYLENOL) 160 MG/5ML oral solution 650 mg (has no administration in time range)     Or   acetaminophen (TYLENOL) suppository 650 mg (has no administration in time range)   ondansetron ODT (ZOFRAN-ODT) disintegrating tablet 4 mg ( Oral Not Given:  See Alt 7/21/25 1722)     Or   ondansetron (ZOFRAN) injection 4 mg (4 mg Intravenous Given 7/21/25 1722)   calcium carbonate (TUMS) chewable tablet 500 mg (200 mg elemental) (has no administration in time range)   HYDROmorphone (DILAUDID) injection 0.5 mg (0.5 mg Intravenous Given 7/21/25 1948)   Magnesium Standard Dose Replacement - Follow Nurse / BPA Driven Protocol (has no administration in time range)   Phosphorus Replacement - Follow Nurse / BPA Driven Protocol (has no administration in time range)   Calcium Replacement - Follow Nurse / BPA Driven Protocol (has no administration in time range)   Potassium Replacement - Follow Nurse / BPA Driven Protocol (has no administration in time range)   potassium chloride 10 mEq in 100 mL IVPB (10 mEq Intravenous Not Given 7/21/25 1638)   octreotide (sandoSTATIN) injection 100 mcg (100 mcg Intravenous Given 7/21/25 1734)   enoxaparin sodium (LOVENOX) syringe 80 mg (80 mg Subcutaneous Given 7/21/25 1100)   metoprolol succinate XL (TOPROL-XL) 24 hr tablet 12.5 mg (has no administration in time range)   escitalopram (LEXAPRO) tablet 10 mg (10 mg Oral Given 7/21/25 1053)   famotidine (PEPCID)  tablet 20 mg (20 mg Oral Given 7/21/25 1053)   HYDROcodone-acetaminophen (NORCO) 5-325 MG per tablet 2 tablet (has no administration in time range)   diphenoxylate-atropine (LOMOTIL) 2.5-0.025 MG per tablet 2 tablet (2 tablets Oral Given 7/21/25 1722)   dicyclomine (BENTYL) capsule 20 mg (20 mg Oral Given 7/21/25 1906)   clonazePAM (KlonoPIN) tablet 1 mg (has no administration in time range)   loperamide (IMODIUM) capsule 2 mg (2 mg Oral Given 7/21/25 1906)   vitamin B-12 (CYANOCOBALAMIN) tablet 500 mcg (500 mcg Oral Given 7/21/25 1348)   sodium chloride 0.9 % with KCl 20 mEq/L infusion (100 mL/hr Intravenous New Bag 7/21/25 1207)   hydrocortisone-bacitracin-zinc oxide-nystatin (MAGIC BARRIER) ointment 1 Application (1 Application Topical Given 7/21/25 1637)   Hydrocortisone (Perianal) (ANUSOL-HC) 2.5 % rectal cream (has no administration in time range)   lactated ringers bolus 1,000 mL (1,000 mL Intravenous New Bag 7/21/25 0420)   ondansetron (ZOFRAN) injection 4 mg (4 mg Intravenous Given 7/21/25 0521)   octreotide (sandoSTATIN) injection 200 mcg (200 mcg Intravenous Given 7/21/25 0521)   HYDROmorphone (DILAUDID) injection 0.5 mg (0.5 mg Intravenous Given 7/21/25 0446)   azithromycin (ZITHROMAX) tablet 1,000 mg (1,000 mg Oral Given 7/21/25 1052)       OUTPATIENT MEDICATION MANAGEMENT:  Current Facility-Administered Medications Ordered in Epic   Medication Dose Route Frequency Provider Last Rate Last Admin    acetaminophen (TYLENOL) tablet 650 mg  650 mg Oral Q4H PRN Sheba Solis APRN        Or    acetaminophen (TYLENOL) 160 MG/5ML oral solution 650 mg  650 mg Oral Q4H PRN Sheba Solis APRN        Or    acetaminophen (TYLENOL) suppository 650 mg  650 mg Rectal Q4H PRN Sheba Solis APRN        calcium carbonate (TUMS) chewable tablet 500 mg (200 mg elemental)  2 tablet Oral BID PRN Sheba Solis APRN        Calcium Replacement - Follow Nurse / BPA Driven Protocol   Not Applicable  PRN Sheba Solis APRN        clonazePAM (KlonoPIN) tablet 1 mg  1 mg Oral BID PRN Aniceto Hutton MD        dicyclomine (BENTYL) capsule 20 mg  20 mg Oral 4x Daily Aniceto Hutton MD   20 mg at 07/21/25 1906    diphenoxylate-atropine (LOMOTIL) 2.5-0.025 MG per tablet 2 tablet  2 tablet Oral 4x Daily Aniceto Hutton MD   2 tablet at 07/21/25 1722    enoxaparin sodium (LOVENOX) syringe 80 mg  1 mg/kg Subcutaneous Q12H Aniceto Hutton MD   80 mg at 07/21/25 1100    escitalopram (LEXAPRO) tablet 10 mg  10 mg Oral Daily Aniceto Hutton MD   10 mg at 07/21/25 1053    famotidine (PEPCID) tablet 20 mg  20 mg Oral BID Aniceto Hutton MD   20 mg at 07/21/25 1053    HYDROcodone-acetaminophen (NORCO) 5-325 MG per tablet 2 tablet  2 tablet Oral Q6H PRN Aniceto Hutton MD        Hydrocortisone (Perianal) (ANUSOL-HC) 2.5 % rectal cream   Rectal BID Aniceto Hutton MD        hydrocortisone-bacitracin-zinc oxide-nystatin (MAGIC BARRIER) ointment 1 Application  1 Application Topical Q2H PRN Aniceto Hutton MD   1 Application at 07/21/25 1637    HYDROmorphone (DILAUDID) injection 0.5 mg  0.5 mg Intravenous Q2H PRN Sheba Solis APRN   0.5 mg at 07/21/25 1948    loperamide (IMODIUM) capsule 2 mg  2 mg Oral 4x Daily Aniceto Hutton MD   2 mg at 07/21/25 1906    Magnesium Standard Dose Replacement - Follow Nurse / BPA Driven Protocol   Not Applicable PRSheba Su APRN        metoprolol succinate XL (TOPROL-XL) 24 hr tablet 12.5 mg  12.5 mg Oral Nightly Aniceto Hutton MD        octreotide (sandoSTATIN) injection 100 mcg  100 mcg Intravenous TID Jet Shannon MD   100 mcg at 07/21/25 1734    ondansetron ODT (ZOFRAN-ODT) disintegrating tablet 4 mg  4 mg Oral Q6H PRN Sheba Solis APRN        Or    ondansetron (ZOFRAN) injection 4 mg  4 mg Intravenous Q6H PRN Sheba Solis APRN   4 mg at 07/21/25 1722    Phosphorus Replacement - Follow Nurse / BPA Driven  Protocol   Not Applicable PRN Sheba Solis APRN        potassium chloride (KLOR-CON M20) CR tablet 40 mEq  40 mEq Oral Once Traci Posadas PA-C        Potassium Replacement - Follow Nurse / BPA Driven Protocol   Not Applicable PRN Sheba Solis APRN        sodium chloride 0.9 % flush 10 mL  10 mL Intravenous Q12H Sheba Solis APRN        sodium chloride 0.9 % flush 10 mL  10 mL Intravenous PRN Sheba Solis APRN        sodium chloride 0.9 % infusion 40 mL  40 mL Intravenous PRN Sheba Solis APRN        sodium chloride 0.9 % with KCl 20 mEq/L infusion  100 mL/hr Intravenous Continuous Aniceto Hutton  mL/hr at 07/21/25 1207 100 mL/hr at 07/21/25 1207    vitamin B-12 (CYANOCOBALAMIN) tablet 500 mcg  500 mcg Oral Once per day on Monday Wednesday Friday Aniceto Hutton MD   500 mcg at 07/21/25 1348     Current Outpatient Medications Ordered in Epic   Medication Sig Dispense Refill    Chlorhexidine Gluconate 4 % solution Apply 1 Application topically to the appropriate area as directed 2 (Two) Times a Day. Shower With Hibiclens Solution Twice The Day Before Surgery 236 mL 0             PROGRESS, DATA ANALYSIS, CONSULTS, AND MEDICAL DECISION MAKING  ORDERS PLACED DURING THIS VISIT:  Orders Placed This Encounter   Procedures    Gastrointestinal Panel, PCR - Stool, Per Rectum    Clostridioides difficile Toxin - Stool, Per Rectum    Clostridioides difficile Toxin, PCR - Stool, Per Rectum    Comprehensive Metabolic Panel    Magnesium    CBC Auto Differential    Potassium    Comprehensive Metabolic Panel    Diet: Regular/House; Fluid Consistency: Thin (IDDSI 0)    Vital Signs    Intake & Output    Weigh Patient    Oral Care    Saline Lock & Maintain IV Access    Place Sequential Compression Device    Maintain Sequential Compression Device    Opioid Administration - Document EtCO2 and / or SpO2 With Each Set of Vitals & Any Change in Patient Status    Opioid  Administration - Notify Provider Hypercapnic Monitoring    Opioid Administration - Continuous Pulse Oximetry (SpO2)    Code Status and Medical Interventions: CPR (Attempt to Resuscitate); Full Support    [facility] (on-call MD unless specified) Details    Hematology & Oncology Inpatient Consult    Inpatient Colorectal Surgery Consult    Inpatient Nutrition Consult    Inpatient Infectious Diseases Consult    Patient Isolation Contact Spore    Insert Peripheral IV    Initiate Observation Status    CBC & Differential    CBC & Differential       All labs have been independently interpreted by me.  All radiology studies have been reviewed by me. All EKG's have been independently viewed and interpreted by me.  Discussion below represents my analysis of pertinent findings related to patient's condition, differential diagnosis, treatment plan and final disposition.    Differential diagnosis includes but is not limited to:   Chemo induced diarrhea, GI pathogen, C. difficile, colitis, functional diarrhea    ED Course:  ED Course as of 07/21/25 2046 Mon Jul 21, 2025 0357 I discussed the case with Dr. Uribe and she agrees to evaluate the patient at the bedside.    [CC]   0436 WBC: 4.63 [CC]   0436 Hemoglobin: 13.4 [CC]   0510 Spoke with SINDY Scruggs with [facility].  Reviewed history, exam, results, treatments.  She agrees admit the patient to Dr. Arteaga.   [CC]   0511 Spoke with SINDY Scruggs with [facility].  Reviewed history, exam, results, treatments.  She agrees admit the patient to Dr. Arteaga.   [CC]   0512 Potassium(!): 3.0  Replacing orally [CC]   0512 Magnesium: 2.0 [CC]      ED Course User Index  [CC] Traci Posadas PA-C           AS OF 20:46 EDT VITALS:    BP - 108/77  HR - 64  TEMP - 98.2 °F (36.8 °C) (Oral)  O2 SATS - 95%        MDM:  Patient is a 44-year-old female with a history of rectal cancer presents emergency department today with postradiation diarrhea and rectal pain.  On arrival here in the emergency department  vitals are reassuring, she is afebrile.  On my exam the patient's abdomen is soft and nontender.  She appears uncomfortable secondary to the rectal pain.  She was evaluated with labs which are overall reassuring with the exception of a hypokalemia which was replaced patient.  Patient was also given a dose of IV octreotide for the diarrhea as this is what her oncologist has been using to manage this as an outpatient.  She has surgery scheduled for a colostomy but given the worsening diarrhea we will plan to admit.  I have a low suspicion that the patient has a an infectious etiology but will rule out with GI panel and C. difficile.  I do not believe she needs any further imaging today as her pain is all rectal not intra-abdominal.  Her white blood cell count is normal and she is afebrile.  CT could be considered if symptoms worsen.  Will plan to admit for further evaluation and management.  She is agreeable to plan stable time admission.      COMPLEXITY OF CARE  The patient requires admission.        DIAGNOSIS  Final diagnoses:   Diarrhea, unspecified type   Rectal pain         DISPOSITION  ED Disposition       ED Disposition   Decision to Admit    Condition   --    Comment   Level of Care: Med/Surg [1]   Diagnosis: Diarrhea [787.91.ICD-9-CM]   Admitting Physician: BRANDON BOGGS [[id number]]   Attending Physician: BRANDON BOGGS [[id number]]   Bed Request Comments: oncology patient   Is patient appropriate for Inpatient Observation Unit?: No [0]                      Please note that portions of this document were completed with a voice recognition program.    Note Disclaimer: At [facility], we believe that sharing information builds trust and better relationships. You are receiving this note because you recently visited [facility]. It is possible you will see health information before a provider has talked with you about it. This kind of information can be easy to misunderstand. To help you fully understand what  it means for your health, we urge you to discuss this note with your provider.     Traci Posadas PA-C  07/21/25 0867

## 2025-07-21 NOTE — CONSULTS
Carole Weaver is a 44 y.o. female who is seen as a consult at the request of Sheba Solis APRN for diarrhea.     HPI:  History of Present Illness  Pt with a Hx of factor V Leiden deficiency and T3N1 rectal cancer (S/P low anterior resection 12/02/2019), current undergoing chemotherapy was seen in the office by Dr. Esparza 07/18/2025 for diarrhea. A CT of the abdomen/pelvis performed 05/14/2025 showed evidence of overflow diarrhea and some stenosis secondary to radiation therapy. Pt was recommended to undergo a colostomy to improve quality of life. This procedure is scheduled for 07/29/2025.     Pt admitted for persistent diarrhea and hypokalemia. C. Diff was negative, but the GI panel was positive for EPEC. ID consulted and recommended treating empirically with a one-time dose of Azithromycin.      Past Medical History:   Diagnosis Date    Abdominopelvic abscess 08/12/2020    ADMITTED TO [facility]    Abnormal Pap smear of cervix 02/02/1998    JULIUS I    Abscess of abdominal wall 11/28/2012    SEEN AT [facility] ER    Anemia in pregnancy     Anxiety     Bilateral epiphora 04/2020    Bilateral hand swelling 05/02/2020    SEEN AT [facility] ER    Cervical lymphadenitis 06/06/2012    SEEN AT [facility] ER    Chemotherapy induced diarrhea 01/2021    Chemotherapy induced neutropenia 04/2020    Chemotherapy-induced nausea 02/2020    Chemotherapy-induced thrombocytopenia 05/2020    Chronic anticoagulation     Chronic diarrhea     Colon polyps     FOLLOWED BY DR. 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 ALEXANDRU HUNT    History of gestational diabetes     History of pre-eclampsia     History of pre-eclampsia     History of radiation therapy     FOLLOWED BY DR. JAVON LEWIS    History of TB skin testing 2009    TB Skin Test    History of TB skin testing 2009    TB Skin Test    History of transfusion 2019    HPV in female     Hypokalemia 2019    Hypomagnesemia 2019    Hyponatremia 2021    IBS (irritable bowel syndrome)     Ileostomy present 2020    Take down on 11/3/2022    Infected insect bite of neck 2016    SEEN AT [facility]    Kidney stone on right side 10/07/2019    Kidney stones 2007    SEEN AT [facility]    Low anterior resection syndrome 2022    FOLLOWED BY DR. GERONIMO ESPARZA    Lump of right breast     SWOLLEN LYMPH NODE    Lung cancer 2020    METASTATIC LUNG CANCER    Macrocytosis 2021    Monopolar depression     On anticoagulant therapy     Pain associated with surgical procedure 2020    Palmar-plantar erythrodysesthesia 2021    Perirectal abscess 2020    Perirectal abscess 2020    Added automatically from request for surgery [id number]      Pulmonary embolism without acute cor pulmonale 09/20/2019    X 3; ADMITTED TO [facility]    Pulmonary nodule, right 2020    Rectal cancer 2019    STAGE IIA, INVASIVE MODERATELY DIFFERENTIATED ADENOCARCINOMA, CHEMO AND XRT FINISHED 2019    Right shoulder pain 2020    SEEN AT [facility] ER    Right ureteral stone 10/01/2019    SEEN AT [facility] ER    Right ureteral stone 10/01/2019    SEEN AT [facility] ER      SAB (spontaneous ) 1996     A2-1 INDUCED    Sciatica     Sepsis due to cellulitis 2002    LEFT GREAT TOE, ADMITTED TO [facility]    Tachycardia 2020    Thrombosis of superior vena cava 2020    AND BRACHIOCEPHALIC VEIN,  ADMITTED TO [facility]    Urinary urgency 03/2020       Past Surgical History:   Procedure Laterality Date    ABDOMINAL SURGERY      CHOLECYSTECTOMY N/A 10/10/2003    LAPAROSCOPIC WITH CHOLANGIOGRAM, DR. JAMEY TALAVERA AT [facility]    COLON RESECTION N/A 12/02/2019    Procedure: laparoscopic low anterior colon resection with mobilization of splenic flexure and diverting loop ileostomy: ERAS;  Surgeon: Padmaja Esparza MD;  Location: [facility];  Service: General    COLON RESECTION N/A 08/03/2020    Procedure: LOW ANTERIOR COLON RESECTION, COLOANAL ANASTOMOSIS, MOBILIZATION SPLENIC FLEXURE;  Surgeon: Padmaja Esparza MD;  Location: [facility];  Service: General;  Laterality: N/A;    COLONOSCOPY N/A 07/15/2020    PATENT ANASTAMOSIS IN MID RECTUM, RESCOPE IN 1 YR, DR. PADMAJA ESPARZA AT [facility]    COLONOSCOPY N/A 11/03/2022    DIFFUSE AREA OF MODERATELY FRIABLE MUCOSA IN ENTIRE COLON , DIVERSION COLITIS, PATENT AND HEALTHY ANASTAMOSIS, DR. PADMAJA ESPARZA AT [facility]    COLONOSCOPY N/A 12/04/2023    6 MM SESSILE SERRATED ADENOMA POLYP IN DESCENDING, PATENT ANASTAMOSIS IN DISTAL RECTUM, RESCOPE IN 2 YRS, DR. PADMAJA ESPARZA AT [facility]    COLONOSCOPY N/A 06/04/2025    PATENT AND HEALTHY ANASTAMOSIS IN DISTAL RECTUM, RESCOPE IN 2 YRS, DR. PADMAJA ESPARZA AT [facility]    COLONOSCOPY W/ POLYPECTOMY N/A 07/08/2019    15 MM TUBULOVILLOUS ADENOMA POLYP IN HEPATIC FLEXURE, 20 MMTUBULOVILLOUS ADENOMA WITH HIGH GRADE DYSPLASIA IN RECTOSIGMOID, 4 CM MASS IN MID RECTUM, PATH: INVASIVE MODERATELY DIFFERENTIATED ADENOCARCINOMA, DR. JENNIFER LI AT [facility]    DILATATION AND EVACUATION N/A 2009    ENDOSCOPY N/A 07/08/2019    LA GRADE A ESOPHAGITIS, GASTRITIS, ALL BIOPSIES BENIGN, DR. JENNIFER LI AT [facility]    ILEOSTOMY CLOSURE N/A 11/14/2022    Procedure: ILEOSTOMY TAKEDOWN;  Surgeon: Padmaja Esparza MD;  Location: [facility];  Service: General;  Laterality: N/A;    INCISION AND DRAINAGE PERIRECTAL ABSCESS N/A 08/14/2020    Procedure:  INCISION AND DRAINAGE OF retrorectal dehiscence abcess with drain placement and irrigation;  Surgeon: Padmaja Esparza MD;  Location: [facility] MAIN OR;  Service: General;  Laterality: N/A;    PAP SMEAR N/A 01/24/2014    SIGMOIDOSCOPY N/A 07/24/2019    INFILTRATIVE PARTIALLY OBSTRUCTING LARGE RECTAL CANCER, AREA TATOOED, DR. PADMAJA ESPARZA AT [facility]    SIGMOIDOSCOPY N/A 11/23/2019    AN ULCERATED PARTIALLY OBSTRUCTING MASS IN MID RECTUM, AREA TATTOOED, DR. PADMAJA ESPARZA AT [facility]    SIGMOIDOSCOPY N/A 07/22/2021    PATENT ANASTAMOSIS IN DISTAL RECTUM, RESCOPE IN 1 YR, DR. PADMAJA ESPARZA AT [facility]    SIGMOIDOSCOPY N/A 09/11/2024    PATCHY INFLAMMATION AND EDEMA IN DESCENDING, AREA BIOPSIED AND WAS BENIGN, PATENT AND HEALTHY ANASTAMOSIS, HEMORRHOIDS,RESCOPE(CY) 12/2025, DR. PADMAJA ESPARZA AT [facility]    TRANSRECTAL ULTRASOUND N/A 07/24/2019    Procedure: ULTRASOUND TRANSRECTAL;  Surgeon: Padmaja Esparza MD;  Location: [facility] ENDOSCOPY;  Service: General    URETEROSCOPY LASER LITHOTRIPSY WITH STENT INSERTION Right 10/30/2019    DR. ESTUARDO BEASLEY AT [place]    VAGINAL DELIVERY N/A 09/18/1998    VENOUS ACCESS DEVICE (PORT) INSERTION Left 08/09/2019    Procedure: INSERTION VENOUS ACCESS DEVICE;  Surgeon: Sj Joseph MD;  Location: [facility] OR [facility];  Service: General    VENOUS ACCESS DEVICE (PORT) INSERTION N/A 12/18/2020    Procedure: INSERTION VENOUS ACCESS DEVICE right side, removal venous access device left side;  Surgeon: Sj Joseph MD;  Location: [facility] [place] [facility];  Service: General;  Laterality: N/A;    WISDOM TOOTH EXTRACTION Bilateral 1993       Social History:   reports that she has been smoking cigarettes. She has a 25 pack-year smoking history. She has been exposed to tobacco smoke. She has never used smokeless tobacco. She reports current alcohol use. She reports that she does not use drugs.      Marriage status:     Family History   Problem Relation Age of Onset    Hyperlipidemia Mother     Colon polyps Mother      Heart disease Mother     Hypertension Mother     Factor V Leiden deficiency Mother     Skin cancer Father         Squamous in 60s    Atrial fibrillation Father     Drug abuse Sister     Mental illness Sister         Addiction    No Known Problems Son     Colon cancer Maternal Uncle     Cancer Paternal Uncle     Colon cancer Paternal Uncle     Diabetes Paternal Uncle     Heart disease Paternal Grandfather     Cancer Paternal Aunt         OVARIAN    Malig Hyperthermia Neg Hx          Current Facility-Administered Medications:     acetaminophen (TYLENOL) tablet 650 mg, 650 mg, Oral, Q4H PRN **OR** acetaminophen (TYLENOL) 160 MG/5ML oral solution 650 mg, 650 mg, Oral, Q4H PRN **OR** acetaminophen (TYLENOL) suppository 650 mg, 650 mg, Rectal, Q4H PRN, Sheba Solis APRN    azithromycin (ZITHROMAX) tablet 1,000 mg, 1,000 mg, Oral, Once, Bud Campbell,     calcium carbonate (TUMS) chewable tablet 500 mg (200 mg elemental), 2 tablet, Oral, BID PRN, Sheba Solis APRN    Calcium Replacement - Follow Nurse / BPA Driven Protocol, , Not Applicable, PRN, Sheba Solis APRN    clonazePAM (KlonoPIN) tablet 1 mg, 1 mg, Oral, BID PRN, Aniceto Hutton MD    dicyclomine (BENTYL) capsule 20 mg, 20 mg, Oral, 4x Daily, Aniceto Hutton MD    diphenoxylate-atropine (LOMOTIL) 2.5-0.025 MG per tablet 2 tablet, 2 tablet, Oral, 4x Daily, Aniceto Hutton MD    enoxaparin sodium (LOVENOX) syringe 80 mg, 1 mg/kg, Subcutaneous, Q12H, Aniceto Hutton MD    escitalopram (LEXAPRO) tablet 10 mg, 10 mg, Oral, Daily, Aniceto Hutton MD    famotidine (PEPCID) tablet 20 mg, 20 mg, Oral, BID, Aniceto Hutton MD    HYDROcodone-acetaminophen (NORCO) 5-325 MG per tablet 2 tablet, 2 tablet, Oral, Q6H PRN, Aniceto Hutton MD    HYDROmorphone (DILAUDID) injection 0.5 mg, 0.5 mg, Intravenous, Q2H PRN, Sheba Solis APRN, 0.5 mg at 07/21/25 0653    loperamide (IMODIUM) capsule 2 mg, 2 mg, Oral, 4x  Daily, Aniceto Hutton MD    Magnesium Standard Dose Replacement - Follow Nurse / BPA Driven Protocol, , Not Applicable, PRN, Sheba Solis APRN    metoprolol succinate XL (TOPROL-XL) 24 hr tablet 12.5 mg, 12.5 mg, Oral, Nightly, Aniceto Hutton MD    octreotide (sandoSTATIN) injection 100 mcg, 100 mcg, Intravenous, TID, Jet Shannon MD    ondansetron ODT (ZOFRAN-ODT) disintegrating tablet 4 mg, 4 mg, Oral, Q6H PRN **OR** ondansetron (ZOFRAN) injection 4 mg, 4 mg, Intravenous, Q6H PRN, Sheba Solis APRN    Phosphorus Replacement - Follow Nurse / BPA Driven Protocol, , Not Applicable, PRN, Sheba Solis APRN    potassium chloride (KLOR-CON M20) CR tablet 40 mEq, 40 mEq, Oral, Once, Traci Posadas PA-C    potassium chloride 10 mEq in 100 mL IVPB, 10 mEq, Intravenous, Q1H, Aniceto Hutton MD, Last Rate: 100 mL/hr at 07/21/25 0653, 10 mEq at 07/21/25 0653    Potassium Replacement - Follow Nurse / BPA Driven Protocol, , Not Applicable, PRNIrma Jennifer Ann, APRN    sodium chloride 0.9 % flush 10 mL, 10 mL, Intravenous, Q12H, Sheba Solis APRN    sodium chloride 0.9 % flush 10 mL, 10 mL, Intravenous, PRN, Sheba Solis APRN    sodium chloride 0.9 % infusion 40 mL, 40 mL, Intravenous, PRNIrma Jennifer Ann, APRN    vitamin B-12 (CYANOCOBALAMIN) tablet 500 mcg, 500 mcg, Oral, Once per day on Monday Wednesday Friday, Aniceto Hutton MD    Allergy  Patient has no known allergies.      Vitals:    07/21/25 0655   BP: 142/78   Pulse: 61   Resp: 20   Temp: 97.5 °F (36.4 °C)   SpO2: 98%     Body mass index is 27.8 kg/m².      Physical Exam    Physical Exam  Exam conducted with a chaperone present.   Constitutional:       General: She is not in acute distress.     Appearance: She is well-developed.   HENT:      Head: Normocephalic and atraumatic.      Nose: Nose normal.   Eyes:      Conjunctiva/sclera: Conjunctivae normal.      Pupils: Pupils are equal,  round, and reactive to light.   Neck:      Trachea: No tracheal deviation.   Pulmonary:      Effort: Pulmonary effort is normal. No respiratory distress.      Breath sounds: Normal breath sounds.   Abdominal:      General: Bowel sounds are normal. There is no distension.      Palpations: Abdomen is soft.   Musculoskeletal:         General: No deformity. Normal range of motion.      Cervical back: Normal range of motion.   Skin:     General: Skin is warm and dry.   Neurological:      Mental Status: She is alert and oriented to person, place, and time.      Cranial Nerves: No cranial nerve deficit.      Coordination: Coordination normal.      Gait: Gait normal.   Psychiatric:         Behavior: Behavior normal.         Judgment: Judgment normal.         Review of Medical Record:  I reviewed medical records as detailed in HPI.     C. Diff: Negative  GI Panel: Positive for EPEC    CBC:      Lab 07/21/25  0422 07/20/25  0007   WBC 4.63 5.00   HEMOGLOBIN 13.4 14.2   HEMATOCRIT 40.3 42.6   PLATELETS 193 209   NEUTROS ABS 2.90 3.10   IMMATURE GRANS (ABS) 0.01 0.01   LYMPHS ABS 1.09 1.37   MONOS ABS 0.46 0.40   EOS ABS 0.15 0.11   MCV 96.9 96.8     CMP:        Lab 07/21/25  0422 07/20/25  0007   SODIUM 142 142   POTASSIUM 3.0* 3.3*   CHLORIDE 107 107   CO2 23.0 24.0   ANION GAP 12.0 11.0   BUN 7.0 8.0   CREATININE 0.65 0.66   EGFR 110.8 110.4   GLUCOSE 102* 71   CALCIUM 8.8 9.0   MAGNESIUM 2.0 2.1   TOTAL PROTEIN 6.4 6.9   ALBUMIN 3.7 4.0   GLOBULIN 2.7 2.9   ALT (SGPT) 32 13   AST (SGOT) 52* 21   BILIRUBIN 0.3 0.2   ALK PHOS 100 90   LIPASE  --  25     Assessment & Plan    Assessment:    - Metastatic rectal cancer currently undergoing chemotherapy  - Enteropathic E. Coli    Plan:   - ID consulted for EPEC and Pt being treated empirically with one-time dose of Azithromycin   - Hypokalemia. Potassium being replaced per primary team.   - Pt currently scheduled for an end colostomy 07/29/2025 and requesting this be performed  sooner. It is recommended that Pt be treated for EPEC prior to surgery. Will therefore keep current surgery date. Okay to D/C home after potassium is replaced and when cleared by primary team.

## 2025-07-21 NOTE — CONSULTS
Subjective     REASON FOR CONSULTATION:    Evaluation and management for metastatic colorectal cancer                             REQUESTING PHYSICIAN:  MD Cameron    RECORDS OBTAINED:  Records of the patients history including those obtained from the referring provider were reviewed and summarized in detail.    HISTORY OF PRESENT ILLNESS:  The patient is a 44 y.o. year old female with metastatic colorectal cancer had presented to [facility] ER with 2 days 3 weeks history of intractable diarrhea.  She had received chemotherapy with 5-FU/Avastin on 6/25/2025.  Patient states the diarrhea started the following week and has persisted despite taking adequate Imodium, Lomotil and octreotide.    Patient has not been able to eat much, states she has only eaten four toast since last 1 week.  She has lost quite a bit weight and worried about dehydration.    On presentation to the ER on early mornings of 7/21/2025, she was hemodynamically stable.  /85 and heart rate of 77.  Labs were unremarkable with normal WBC, hemoglobin and platelets.  She was mildly hypokalemic at 3.0.  Normal serum creatinine of 0.65.    Stool GI PCR was positive for Enteropathogenic E. coli (EPEC).  Negative for C. difficile.    Patient has been seen by ID and plan to administer 1 dose of azithromycin 1 g.    Past Medical History:   Diagnosis Date    Abdominopelvic abscess 08/12/2020    ADMITTED TO [facility]    Abnormal Pap smear of cervix 02/02/1998    JULIUS I    Abscess of abdominal wall 11/28/2012    SEEN AT [facility] ER    Anemia in pregnancy     Anxiety     Bilateral epiphora 04/2020    Bilateral hand swelling 05/02/2020    SEEN AT [facility] ER    Cervical lymphadenitis 06/06/2012    SEEN AT [facility] ER    Chemotherapy induced diarrhea 01/2021    Chemotherapy induced neutropenia 04/2020    Chemotherapy-induced nausea 02/2020    Chemotherapy-induced thrombocytopenia 05/2020    Chronic anticoagulation     Chronic diarrhea     Colon polyps      FOLLOWED BY DR. 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 ALEXANDRU HUNT    History of gestational diabetes     History of pre-eclampsia 1998    History of pre-eclampsia     History of radiation therapy 2019    FOLLOWED BY DR. JAVON LEWIS    History of TB skin testing 01/17/2009    TB Skin Test    History of TB skin testing 01/17/2009    TB Skin Test    History of transfusion 2019    12/2019    HPV in female 1998    Hypokalemia 09/2019    Hypomagnesemia 09/2019    Hyponatremia 06/2021    IBS (irritable bowel syndrome)     Ileostomy present 04/25/2020    Take down on 11/3/2022    Infected insect bite of neck 05/27/2016    SEEN AT [facility]    Kidney stone on right side 10/07/2019    Kidney stones 08/09/2007    SEEN AT [facility] ER    Low anterior resection syndrome 12/2022    FOLLOWED BY DR. 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    Perirectal abscess 08/12/2020    Perirectal abscess 08/12/2020    Added automatically from request for surgery [id number]      Pulmonary embolism without acute cor pulmonale 09/20/2019    X 3; ADMITTED TO [facility]    Pulmonary nodule, right 05/2020    Rectal cancer 07/08/2019    STAGE  IIA, INVASIVE MODERATELY DIFFERENTIATED ADENOCARCINOMA, CHEMO AND XRT FINISHED 2019    Right shoulder pain 2020    SEEN AT [facility] ER    Right ureteral stone 10/01/2019    SEEN AT [facility] ER    Right ureteral stone 10/01/2019    SEEN AT [facility] ER      SAB (spontaneous ) 1996     A2-1 INDUCED    Sciatica     Sepsis due to cellulitis 2002    LEFT GREAT TOE, ADMITTED TO [facility]    Tachycardia 2020    Thrombosis of superior vena cava 2020    AND BRACHIOCEPHALIC VEIN, ADMITTED TO [facility]    Urinary urgency 2020        Past Surgical History:   Procedure Laterality Date    ABDOMINAL SURGERY      CHOLECYSTECTOMY N/A 10/10/2003    LAPAROSCOPIC WITH CHOLANGIOGRAM, DR. JAMEY TALAVERA AT [facility]    COLON RESECTION N/A 2019    Procedure: laparoscopic low anterior colon resection with mobilization of splenic flexure and diverting loop ileostomy: ERAS;  Surgeon: Padmaja Esparza MD;  Location: [facility];  Service: General    COLON RESECTION N/A 2020    Procedure: LOW ANTERIOR COLON RESECTION, COLOANAL ANASTOMOSIS, MOBILIZATION SPLENIC FLEXURE;  Surgeon: Padmaja Esparza MD;  Location: [facility] OR;  Service: General;  Laterality: N/A;    COLONOSCOPY N/A 07/15/2020    PATENT ANASTAMOSIS IN MID RECTUM, RESCOPE IN 1 YR, DR. PADMAJA ESPARZA AT [facility]    COLONOSCOPY N/A 2022    DIFFUSE AREA OF MODERATELY FRIABLE MUCOSA IN ENTIRE COLON , DIVERSION COLITIS, PATENT AND HEALTHY ANASTAMOSIS, DR. PADMAJA ESPARZA AT [facility]    COLONOSCOPY N/A 2023    6 MM SESSILE SERRATED ADENOMA POLYP IN DESCENDING, PATENT ANASTAMOSIS IN DISTAL RECTUM, RESCOPE IN 2 YRS, DR. PADMAJA ESPARZA AT [facility]    COLONOSCOPY N/A 2025    PATENT AND HEALTHY ANASTAMOSIS IN DISTAL RECTUM, RESCOPE IN 2 YRS, DR. PADMAJA ESPARZA AT [facility]    COLONOSCOPY W/ POLYPECTOMY N/A 2019    15 MM TUBULOVILLOUS ADENOMA POLYP IN HEPATIC FLEXURE, 20 MMTUBULOVILLOUS ADENOMA WITH HIGH GRADE DYSPLASIA IN RECTOSIGMOID, 4 CM MASS IN MID RECTUM, PATH:  INVASIVE MODERATELY DIFFERENTIATED ADENOCARCINOMA, DR. JENNIFER LI AT [facility]    DILATATION AND EVACUATION N/A 2009    ENDOSCOPY N/A 07/08/2019    LA GRADE A ESOPHAGITIS, GASTRITIS, ALL BIOPSIES BENIGN, DR. JENNIFER LI AT [facility]    ILEOSTOMY CLOSURE N/A 11/14/2022    Procedure: ILEOSTOMY TAKEDOWN;  Surgeon: Geronimo Ye MD;  Location: [facility] OR;  Service: General;  Laterality: N/A;    INCISION AND DRAINAGE PERIRECTAL ABSCESS N/A 08/14/2020    Procedure: INCISION AND DRAINAGE OF retrorectal dehiscence abcess with drain placement and irrigation;  Surgeon: Geronimo Ye MD;  Location: [facility] OR;  Service: General;  Laterality: N/A;    PAP SMEAR N/A 01/24/2014    SIGMOIDOSCOPY N/A 07/24/2019    INFILTRATIVE PARTIALLY OBSTRUCTING LARGE RECTAL CANCER, AREA TATOOED, DR. GERONIMO YE AT [facility]    SIGMOIDOSCOPY N/A 11/23/2019    AN ULCERATED PARTIALLY OBSTRUCTING MASS IN MID RECTUM, AREA TATTOOED, DR. GERONIMO YE AT [facility]    SIGMOIDOSCOPY N/A 07/22/2021    PATENT ANASTAMOSIS IN DISTAL RECTUM, RESCOPE IN 1 YR, DR. GERONIMO YE AT [facility]    SIGMOIDOSCOPY N/A 09/11/2024    PATCHY INFLAMMATION AND EDEMA IN DESCENDING, AREA BIOPSIED AND WAS BENIGN, PATENT AND HEALTHY ANASTAMOSIS, HEMORRHOIDS,RESCOPE(CY) 12/2025, DR. GERONIMO YE AT [facility]    TRANSRECTAL ULTRASOUND N/A 07/24/2019    Procedure: ULTRASOUND TRANSRECTAL;  Surgeon: Geronimo Ye MD;  Location: [facility] ENDOSCOPY;  Service: General    URETEROSCOPY LASER LITHOTRIPSY WITH STENT INSERTION Right 10/30/2019    DR. ESTUARDO BEASLEY AT [place]    VAGINAL DELIVERY N/A 09/18/1998    VENOUS ACCESS DEVICE (PORT) INSERTION Left 08/09/2019    Procedure: INSERTION VENOUS ACCESS DEVICE;  Surgeon: Sj Joseph MD;  Location: [facility];  Service: General    VENOUS ACCESS DEVICE (PORT) INSERTION N/A 12/18/2020    Procedure: INSERTION VENOUS ACCESS DEVICE right side, removal venous access device left side;  Surgeon: Sj Joesph MD;   [place]  [facility]  Service: General;  Laterality: N/A;    WISDOM TOOTH EXTRACTION Bilateral 1993        No current facility-administered medications on file prior to encounter.     Current Outpatient Medications on File Prior to Encounter   Medication Sig Dispense Refill    clonazePAM (KlonoPIN) 1 MG tablet TAKE 1 TABLET AS NEEDED DAILY FOR ANXIETY. MAY TAKE ONE ADDITIONAL AS NEEDED FOR SEVERE ANXIETY. 45 tablet 0    dicyclomine (BENTYL) 20 MG tablet TAKE 1 TABLET BY MOUTH EVERY 6 (SIX) HOURS AS NEEDED FOR IRRITABLE BOWEL SYMPTOMS 360 tablet 2    diphenoxylate-atropine (LOMOTIL) 2.5-0.025 MG per tablet TAKE 2 TABLETS BY MOUTH 4 TIMES A  tablet 5    enoxaparin sodium (LOVENOX) 100 MG/ML solution prefilled syringe syringe INJECT 1 ML UNDER THE SKIN INTO THE APPROPRIATE AREA AS DIRECTED EVERY 12 (TWELVE) HOURS. 60 mL 2    escitalopram (LEXAPRO) 10 MG tablet TAKE 1 TABLET BY MOUTH EVERY DAY 90 tablet 1    famotidine (PEPCID) 20 MG tablet TAKE 1 TABLET BY MOUTH TWICE A  tablet 2    HYDROcodone-acetaminophen (NORCO) 5-325 MG per tablet Take 2 tablets by mouth Every 6 (Six) Hours As Needed for Moderate Pain. 90 tablet 0    hydrocortisone 2.5 % cream APPLY RECTALLY 3 TIMES DAILY. INCLUDE APPLICATOR.      nystatin-zinc oxide-hydrocortisone-bacitracin Apply topically to the appropriate area as directed Daily As Needed. (Patient taking differently: Apply 1 Application topically to the appropriate area as directed Daily As Needed (As needed).) 60 g 3    octreotide (sandoSTATIN) 100 MCG/ML injection INJECT 1 ML UNDER THE SKIN INTO THE APPROPRIATE AREA AS DIRECTED 3 TIMES A DAY 90 mL 2    ondansetron (ZOFRAN) 8 MG tablet TAKE 1 TABLET BY MOUTH THREE TIMES A DAY AS NEEDED FOR NAUSEA AND VOMITING 60 tablet 1    pantoprazole (PROTONIX) 40 MG EC tablet TAKE 1 TABLET BY MOUTH EVERY DAY 90 tablet 1    Chlorhexidine Gluconate 4 % solution Apply 1 Application topically to the appropriate area as directed 2 (Two)  "Times a Day. Shower With Hibiclens Solution Twice The Day Before Surgery 236 mL 0    Cyanocobalamin (VITAMIN B-12 PO) Take 1 tablet by mouth 3 (Three) Times a Week. M-W-F      Hydrocortisone, Perianal, (ANUSOL-HC) 2.5 % rectal cream Apply rectally 3 times daily.  Include applicator. 30 g 1    Loperamide HCl (IMODIUM PO) Take  by mouth As Needed.      Loratadine 10 MG capsule Take 1 capsule by mouth Every Evening.      metoprolol succinate XL (TOPROL-XL) 25 MG 24 hr tablet TAKE 0.5 TABLETS BY MOUTH EVERY NIGHT 45 tablet 2    nystatin (MYCOSTATIN) 925620 UNIT/GM cream Apply 1 Application topically to the appropriate area as directed 2 (Two) Times a Day. 30 g 2    rosuvastatin (CRESTOR) 5 MG tablet Take 1 tablet by mouth Daily. NEEDS AN APPOINTMENT FOR FUTURE REFILLS 90 tablet 0    Syringe/Needle, Disp, (BD SafetyGlide Syringe/Needle) 27G X 5/8\" 1 ML misc Use 1 mL 3 (Three) Times a Day As Needed (as needed for diarrhea). 100 each 1        ALLERGIES:  No Known Allergies     Social History     Socioeconomic History    Marital status:      Spouse name: Justus    Number of children: 1    Years of education: College   Tobacco Use    Smoking status: Every Day     Current packs/day: 1.00     Average packs/day: 1 pack/day for 25.0 years (25.0 ttl pk-yrs)     Types: Cigarettes     Passive exposure: Current    Smokeless tobacco: Never    Tobacco comments:     1 PPD/caffeine use    Vaping Use    Vaping status: Former    Substances: Nicotine    Devices: Disposable    Passive vaping exposure: Yes   Substance and Sexual Activity    Alcohol use: Yes     Comment: RARELY    Drug use: Never    Sexual activity: Yes     Partners: Male     Comment: .        Family History   Problem Relation Age of Onset    Hyperlipidemia Mother     Colon polyps Mother     Heart disease Mother     Hypertension Mother     Factor V Leiden deficiency Mother     Skin cancer Father         Squamous in 60s    Atrial fibrillation Father     Drug " "abuse Sister     Mental illness Sister         Addiction    No Known Problems Son     Colon cancer Maternal Uncle     Cancer Paternal Uncle     Colon cancer Paternal Uncle     Diabetes Paternal Uncle     Heart disease Paternal Grandfather     Cancer Paternal Aunt         OVARIAN    Malig Hyperthermia Neg Hx         Review of Systems   As per HPI    Objective     Vitals:    07/21/25 0326 07/21/25 0341 07/21/25 0655   BP: 137/85 129/81 142/78   BP Location: Right arm Right arm Left arm   Patient Position: Sitting Lying Lying   Pulse: 77 66 61   Resp: 18 16 20   Temp: 97.7 °F (36.5 °C)  97.5 °F (36.4 °C)   TempSrc: Oral  Oral   SpO2: 98% 99% 98%   Weight: 76.2 kg (168 lb)  78.1 kg (172 lb 2.9 oz)   Height: 167.6 cm (66\")  167.6 cm (65.98\")         6/25/2025     8:44 AM   Current Status   ECOG score 0       Physical Exam    CONSTITUTIONAL:  Vital signs reviewed.  No distress, looks comfortable.  EYES:  Conjunctivae and lids unremarkable.    EARS,NOSE,MOUTH,THROAT:  Ears and nose appear unremarkable.    RESPIRATORY:  Normal respiratory effort.    CARDIOVASCULAR:  Normal heart rate  GASTROINTESTINAL: Abdomen appears unremarkable.  Nondistended   LYMPHATIC:  No cervical, supraclavicular lymphadenopathy.  SKIN:  Warm.  No rashes.  PSYCHIATRIC:  Normal judgment and insight.  Normal mood and affect.  NEURO: AAOx3, no obvious focal deficits.      RECENT LABS:  Hematology WBC   Date Value Ref Range Status   07/21/2025 4.63 3.40 - 10.80 10*3/mm3 Final     RBC   Date Value Ref Range Status   07/21/2025 4.16 3.77 - 5.28 10*6/mm3 Final     Hemoglobin   Date Value Ref Range Status   07/21/2025 13.4 12.0 - 15.9 g/dL Final     Hematocrit   Date Value Ref Range Status   07/21/2025 40.3 34.0 - 46.6 % Final     Platelets   Date Value Ref Range Status   07/21/2025 193 140 - 450 10*3/mm3 Final          Assessment & Plan   This is a 44-year-old female with:    # Intractable diarrhea; chemotherapy (5-FU and Avastin) vs E. coli infection " related:  Patient has a longstanding (> 2-3 years) history of chemotherapy related diarrhea, for which she has required multiple chemotherapy dose reductions and addition of Imodium, Lomotil PRN along with subcu octreotide at least 3 times per day  This current episode started almost a week after the last dose of chemotherapy on 6/25/2025.  Symptoms not controlled with all available PRN medications and octreotide.  GI PCR this admission is positive for enteropathogenic E. coli (EPEC).  C. difficile negative  Seen by ID with plans to administer p.o. azithromycin 1 g x 1 dose  Patient is being planned for colostomy as outpatient on 7/29/2025.  Last dose of Avastin was ~4 weeks ago, on 6/25/2025.  Will continue IV fluids and antimotility drugs  Plan to hold off on chemotherapy for now    # Metastatic colorectal cancer with lung metastasis:  Follows up with  in our practice  Currently on maintenance 5-FU/Avastin therapy last dose on 6/25/2025, which was 50% dose reduced  Plan to hold off on chemotherapy until she recovers from colostomy procedure    # Hypokalemia: Replacement per primary    Recommendations:  - P.o. azithromycin 1 g x 1 dose per ID  - Continue p.o. Imodium and Lomotil 4 times daily  - Continue subcu octreotide 100 mg 3 times daily  - IV fluids  - Hold off on chemo until colostomy procedure  - Will follow            I spent 82 minutes on this encounter, before, during & after the visit evaluating the patient, reviewing records and writing orders.

## 2025-07-21 NOTE — H&P
[facility]   HISTORY AND PHYSICAL    Patient Name: Carole Weaver  : 1980  MRN: [id number]  Primary Care Physician:  Deepika Vela III, NP-C  Date of admission: 2025    Subjective   Subjective     Chief Complaint: diarrhea    History of Present Illness    Stated Reason for Visit: Patient from home with report of diarrhea. She is a stage 4 colorectal cancer patient and is scheduled for surgery for permanent colostomy. She reports her diarrhea was better this morning but has now returned to all liquid and feels like it is burning her rectum.  History Obtained From: patient    44-year-old female with stage IV adenocarcinoma of the rectum, factor V Leiden who comes to the hospital because of of worsening diarrhea.  Plan was for patient to have colostomy placed in about a week with Dr. Esparza.    Patient uses octreotide at home 3 times daily along with Imodium and Lomotil.  Despite this the patient has had persistent severe diarrhea the last couple weeks.  Patient went to the emergency room the night before the had some improvement in her diarrhea but then last night became severe again and comes to the hospital further evaluation.        Review of Systems     Personal History     Past Medical History:   Diagnosis Date    Abdominopelvic abscess 2020    ADMITTED TO [facility]    Abnormal Pap smear of cervix 1998    JULIUS I    Abscess of abdominal wall 2012    SEEN AT [facility] ER    Anemia in pregnancy     Anxiety     Bilateral epiphora 2020    Bilateral hand swelling 2020    SEEN AT [facility] ER    Cervical lymphadenitis 2012    SEEN AT [facility] ER    Chemotherapy induced diarrhea 2021    Chemotherapy induced neutropenia 2020    Chemotherapy-induced nausea 2020    Chemotherapy-induced thrombocytopenia 2020    Chronic anticoagulation     Chronic diarrhea     Colon polyps     FOLLOWED BY DR. GERONIMO ESPARZA    Coronary artery calcification     COVID-19 2022    Cystitis  04/24/2020    WITH DEHYDRATION, ADMITTED TO 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 ALEXANDRU HUNT    History of gestational diabetes     History of pre-eclampsia 1998    History of pre-eclampsia     History of radiation therapy 2019    FOLLOWED BY DR. JAVON LEWIS    History of TB skin testing 01/17/2009    TB Skin Test    History of TB skin testing 01/17/2009    TB Skin Test    History of transfusion 2019    12/2019    HPV in female 1998    Hypokalemia 09/2019    Hypomagnesemia 09/2019    Hyponatremia 06/2021    IBS (irritable bowel syndrome)     Ileostomy present 04/25/2020    Take down on 11/3/2022    Infected insect bite of neck 05/27/2016    SEEN AT [facility]    Kidney stone on right side 10/07/2019    Kidney stones 08/09/2007    SEEN AT 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    Perirectal abscess 08/12/2020    Perirectal abscess 08/12/2020    Added automatically from request for surgery [id number]      Pulmonary embolism without acute cor pulmonale 09/20/2019    X 3; ADMITTED TO [facility]    Pulmonary nodule, right 05/2020    Rectal cancer 07/08/2019    STAGE IIA, INVASIVE MODERATELY DIFFERENTIATED ADENOCARCINOMA, CHEMO AND XRT FINISHED 9/2019    Right  shoulder pain 2020    SEEN AT [facility] ER    Right ureteral stone 10/01/2019    SEEN AT [facility] ER    Right ureteral stone 10/01/2019    SEEN AT [facility] ER      SAB (spontaneous ) 1996     A2-1 INDUCED    Sciatica     Sepsis due to cellulitis 2002    LEFT GREAT TOE, ADMITTED TO [facility]    Tachycardia 2020    Thrombosis of superior vena cava 2020    AND BRACHIOCEPHALIC VEIN, ADMITTED TO [facility]    Urinary urgency 2020       Past Surgical History:   Procedure Laterality Date    ABDOMINAL SURGERY      CHOLECYSTECTOMY N/A 10/10/2003    LAPAROSCOPIC WITH CHOLANGIOGRAM, DR. JAMEY TALAVERA AT [facility]    COLON RESECTION N/A 2019    Procedure: laparoscopic low anterior colon resection with mobilization of splenic flexure and diverting loop ileostomy: ERAS;  Surgeon: Padmaja Esparza MD;  Location: [facility];  Service: General    COLON RESECTION N/A 2020    Procedure: LOW ANTERIOR COLON RESECTION, COLOANAL ANASTOMOSIS, MOBILIZATION SPLENIC FLEXURE;  Surgeon: Padmaja Esparza MD;  Location: [facility] OR;  Service: General;  Laterality: N/A;    COLONOSCOPY N/A 07/15/2020    PATENT ANASTAMOSIS IN MID RECTUM, RESCOPE IN 1 YR, DR. PADMAJA ESPARZA AT [facility]    COLONOSCOPY N/A 2022    DIFFUSE AREA OF MODERATELY FRIABLE MUCOSA IN ENTIRE COLON , DIVERSION COLITIS, PATENT AND HEALTHY ANASTAMOSIS, DR. PADMAJA ESPARZA AT [facility]    COLONOSCOPY N/A 2023    6 MM SESSILE SERRATED ADENOMA POLYP IN DESCENDING, PATENT ANASTAMOSIS IN DISTAL RECTUM, RESCOPE IN 2 YRS, DR. PADMAJA ESPARZA AT [facility]    COLONOSCOPY N/A 2025    PATENT AND HEALTHY ANASTAMOSIS IN DISTAL RECTUM, RESCOPE IN 2 YRS, DR. PADMAJA ESPARZA AT [facility]    COLONOSCOPY W/ POLYPECTOMY N/A 2019    15 MM TUBULOVILLOUS ADENOMA POLYP IN HEPATIC FLEXURE, 20 MMTUBULOVILLOUS ADENOMA WITH HIGH GRADE DYSPLASIA IN RECTOSIGMOID, 4 CM MASS IN MID RECTUM, PATH: INVASIVE MODERATELY DIFFERENTIATED ADENOCARCINOMA, DR. JENNIFER LI AT [facility]     DILATATION AND EVACUATION N/A 2009    ENDOSCOPY N/A 07/08/2019    LA GRADE A ESOPHAGITIS, GASTRITIS, ALL BIOPSIES BENIGN, DR. JENNIFER LI AT [facility]    ILEOSTOMY CLOSURE N/A 11/14/2022    Procedure: ILEOSTOMY TAKEDOWN;  Surgeon: Geronimo Ye MD;  Location: [facility] MAIN OR;  Service: General;  Laterality: N/A;    INCISION AND DRAINAGE PERIRECTAL ABSCESS N/A 08/14/2020    Procedure: INCISION AND DRAINAGE OF retrorectal dehiscence abcess with drain placement and irrigation;  Surgeon: Geronimo Ye MD;  Location: [facility] MAIN OR;  Service: General;  Laterality: N/A;    PAP SMEAR N/A 01/24/2014    SIGMOIDOSCOPY N/A 07/24/2019    INFILTRATIVE PARTIALLY OBSTRUCTING LARGE RECTAL CANCER, AREA TATOOED, DR. GERONIMO YE AT [facility]    SIGMOIDOSCOPY N/A 11/23/2019    AN ULCERATED PARTIALLY OBSTRUCTING MASS IN MID RECTUM, AREA TATTOOED, DR. GERONIMO YE AT [facility]    SIGMOIDOSCOPY N/A 07/22/2021    PATENT ANASTAMOSIS IN DISTAL RECTUM, RESCOPE IN 1 YR, DR. GERONIMO YE AT [facility]    SIGMOIDOSCOPY N/A 09/11/2024    PATCHY INFLAMMATION AND EDEMA IN DESCENDING, AREA BIOPSIED AND WAS BENIGN, PATENT AND HEALTHY ANASTAMOSIS, HEMORRHOIDS,RESCOPE(CY) 12/2025, DR. GERONIMO YE AT [facility]    TRANSRECTAL ULTRASOUND N/A 07/24/2019    Procedure: ULTRASOUND TRANSRECTAL;  Surgeon: Geronimo Ye MD;  Location: [facility] ENDOSCOPY;  Service: General    URETEROSCOPY LASER LITHOTRIPSY WITH STENT INSERTION Right 10/30/2019    DR. ESTUARDO BEASLEY AT [facility]    VAGINAL DELIVERY N/A 09/18/1998    VENOUS ACCESS DEVICE (PORT) INSERTION Left 08/09/2019    Procedure: INSERTION VENOUS ACCESS DEVICE;  Surgeon: Sj Joseph MD;  Location: [facility];  Service: General    VENOUS ACCESS DEVICE (PORT) INSERTION N/A 12/18/2020    Procedure: INSERTION VENOUS ACCESS DEVICE right side, removal venous access device left side;  Surgeon: Sj Joseph MD;  Location:  [facility];  Service: General;  Laterality: N/A;    WISDOM TOOTH EXTRACTION Bilateral  1993       Family History: family history includes Atrial fibrillation in her father; Cancer in her paternal aunt and paternal uncle; Colon cancer in her maternal uncle and paternal uncle; Colon polyps in her mother; Diabetes in her paternal uncle; Drug abuse in her sister; Factor V Leiden deficiency in her mother; Heart disease in her mother and paternal grandfather; Hyperlipidemia in her mother; Hypertension in her mother; Mental illness in her sister; No Known Problems in her son; Skin cancer in her father. Otherwise pertinent FHx was reviewed and not pertinent to current issue.    Social History:  reports that she has been smoking cigarettes. She has a 25 pack-year smoking history. She has been exposed to tobacco smoke. She has never used smokeless tobacco. She reports current alcohol use. She reports that she does not use drugs.    Home Medications:  Chlorhexidine Gluconate, Cyanocobalamin, HYDROcodone-acetaminophen, Hydrocortisone (Perianal), Loperamide HCl, Loratadine, Syringe/Needle (Disp), clonazePAM, dicyclomine, diphenoxylate-atropine, enoxaparin sodium, escitalopram, famotidine, hydrocortisone, metoprolol succinate XL, nystatin, nystatin-zinc oxide-hydrocortisone-bacitracin, octreotide, ondansetron, pantoprazole, and rosuvastatin    Allergies:  No Known Allergies    Objective    Objective     Vitals:   Temp:  [97.5 °F (36.4 °C)-97.7 °F (36.5 °C)] 97.5 °F (36.4 °C)  Heart Rate:  [61-77] 61  Resp:  [16-20] 20  BP: (129-142)/(78-85) 142/78    Physical Exam  Constitutional:       General: She is not in acute distress.  Cardiovascular:      Rate and Rhythm: Normal rate and regular rhythm.   Pulmonary:      Effort: Pulmonary effort is normal.      Breath sounds: Normal breath sounds.   Abdominal:      General: There is no distension.      Palpations: Abdomen is soft.      Tenderness: There is no abdominal tenderness.   Skin:     General: Skin is warm and dry.   Neurological:      General: No focal deficit  present.      Mental Status: She is alert.   Psychiatric:         Mood and Affect: Mood normal.         Behavior: Behavior normal.         Result Review    Result Review:  I have personally reviewed the results from the time of this admission to 7/21/2025 09:28 EDT and agree with these findings:  []  Laboratory list / accordion  [x]  Microbiology  [x]  Radiology  []  EKG/Telemetry   [x]  Cardiology/Vascular   [x]  Pathology  [x]  Old records  []  Other:  Most notable findings include: diarrhea      Assessment & Plan   Assessment / Plan     Brief Patient Summary:  Carole Weaver is a 44 y.o. female who diarrhea    Active Hospital Problems:  Active Hospital Problems    Diagnosis     **Diarrhea      Plan:   Diarrhea  -lomitil, imodium  - Continue with octreotide  -Positive for E. Coli (given her immunocompromise state err on the side of treatment with Zithromax x 1, patient seen by infectious disease)    Hypokalemia, magnesium is good  -replace    Dvt/PE (last clot in dec 2021) history of factor V Leiden  - Continue with lovenox    Discussed with Dr. Ye and with Dr. Shannon    VTE Prophylaxis:  Pharmacologic & mechanical VTE prophylaxis orders are present.        CODE STATUS:    Code Status (Patient has no pulse and is not breathing): CPR (Attempt to Resuscitate)  Medical Interventions (Patient has pulse or is breathing): Full Support    Admission Status:  I believe this patient meets inpatient status.    Aniceto Hutton MD

## 2025-07-21 NOTE — CONSULTS
"Referring Provider: No ref. provider found  Reason for Consultation:     colon cancer, diarrhea; found to have Enteropathogenic E. coli in stool     Chief Complaint   Patient presents with    Rectal Pain         Subjective   History of present illness: Patient is a 44-year-old female with past medical history of stage IV adenocarcinoma of the rectum on chemotherapy with 5-FU/Avastin/leucovorin and factor V Leiden deficiency who presents with diarrhea.  ID consulted for \"colon cancer, diarrhea; found to have enteropathogenic E. coli in stool\".    Patient followed by oncology outpatient in the setting of ongoing chemotherapy for rectal cancer and noted to have intractable diarrhea on prior chemotherapy.  On admission patient has been afebrile with no leukocytosis.  C. difficile testing was negative.  GI pathogen panel positive for enteropathogenic E. coli.    Patient reports ongoing diarrhea with pain in her rectum.  States she has had similar problems with chemotherapy in the past.  Reports diarrhea has been unrelenting so presented back to the hospital.    Past Medical History:   Diagnosis Date    Abdominopelvic abscess 08/12/2020    ADMITTED TO [facility]    Abnormal Pap smear of cervix 02/02/1998    JULIUS I    Abscess of abdominal wall 11/28/2012    SEEN AT [facility] ER    Anemia in pregnancy     Anxiety     Bilateral epiphora 04/2020    Bilateral hand swelling 05/02/2020    SEEN AT [facility] ER    Cervical lymphadenitis 06/06/2012    SEEN AT [facility] ER    Chemotherapy induced diarrhea 01/2021    Chemotherapy induced neutropenia 04/2020    Chemotherapy-induced nausea 02/2020    Chemotherapy-induced thrombocytopenia 05/2020    Chronic anticoagulation     Chronic diarrhea     Colon polyps     FOLLOWED BY DR. 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 ALEXANDRU HUNT    History of gestational diabetes     History of pre-eclampsia 1998    History of pre-eclampsia     History of radiation therapy 2019    FOLLOWED BY DR. 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    Perirectal abscess 08/12/2020    Perirectal abscess 08/12/2020    Added automatically from request for surgery [id number]      Pulmonary embolism without acute cor pulmonale 09/20/2019    X 3; ADMITTED TO [facility]    Pulmonary nodule, right 05/2020    Rectal cancer 07/08/2019    STAGE IIA, INVASIVE MODERATELY DIFFERENTIATED ADENOCARCINOMA, CHEMO AND XRT FINISHED 9/2019    Right shoulder pain 11/16/2020    SEEN AT [facility] ER    Right ureteral stone 10/01/2019    SEEN AT [facility] ER    Right ureteral stone 10/01/2019    SEEN AT [facility] ER      SAB  (spontaneous ) 1996     A2-1 INDUCED    Sciatica     Sepsis due to cellulitis 2002    LEFT GREAT TOE, ADMITTED TO [facility]    Tachycardia 2020    Thrombosis of superior vena cava 2020    AND BRACHIOCEPHALIC VEIN, ADMITTED TO [facility]    Urinary urgency 2020       Past Surgical History:   Procedure Laterality Date    ABDOMINAL SURGERY      CHOLECYSTECTOMY N/A 10/10/2003    LAPAROSCOPIC WITH CHOLANGIOGRAM, DR. JAMEY TALAVERA AT [facility]    COLON RESECTION N/A 2019    Procedure: laparoscopic low anterior colon resection with mobilization of splenic flexure and diverting loop ileostomy: ERAS;  Surgeon: Padmaja Esparza MD;  Location: [facility] MAIN OR;  Service: General    COLON RESECTION N/A 2020    Procedure: LOW ANTERIOR COLON RESECTION, COLOANAL ANASTOMOSIS, MOBILIZATION SPLENIC FLEXURE;  Surgeon: Padmaja Esparza MD;  Location: [facility] MAIN OR;  Service: General;  Laterality: N/A;    COLONOSCOPY N/A 07/15/2020    PATENT ANASTAMOSIS IN MID RECTUM, RESCOPE IN 1 YR, DR. PADMAJA ESPARZA AT [facility]    COLONOSCOPY N/A 2022    DIFFUSE AREA OF MODERATELY FRIABLE MUCOSA IN ENTIRE COLON , DIVERSION COLITIS, PATENT AND HEALTHY ANASTAMOSIS, DR. PADMAJA ESPARZA AT [facility]    COLONOSCOPY N/A 2023    6 MM SESSILE SERRATED ADENOMA POLYP IN DESCENDING, PATENT ANASTAMOSIS IN DISTAL RECTUM, RESCOPE IN 2 YRS, DR. PADMAJA ESPARZA AT [facility]    COLONOSCOPY N/A 2025    PATENT AND HEALTHY ANASTAMOSIS IN DISTAL RECTUM, RESCOPE IN 2 YRS, DR. PADMAJA ESPARZA AT [facility]    COLONOSCOPY W/ POLYPECTOMY N/A 2019    15 MM TUBULOVILLOUS ADENOMA POLYP IN HEPATIC FLEXURE, 20 MMTUBULOVILLOUS ADENOMA WITH HIGH GRADE DYSPLASIA IN RECTOSIGMOID, 4 CM MASS IN MID RECTUM, PATH: INVASIVE MODERATELY DIFFERENTIATED ADENOCARCINOMA, DR. JENNIFER LI AT [facility] CENTER    DILATATION AND EVACUATION N/A     ENDOSCOPY N/A 2019    LA GRADE A ESOPHAGITIS, GASTRITIS, ALL BIOPSIES BENIGN, DR. JENNIFER LI AT [facility]  [place]    ILEOSTOMY CLOSURE N/A 11/14/2022    Procedure: ILEOSTOMY TAKEDOWN;  Surgeon: Geronimo Esparza MD;  [facility];  Service: General;  Laterality: N/A;    INCISION AND DRAINAGE PERIRECTAL ABSCESS N/A 08/14/2020    Procedure: INCISION AND DRAINAGE OF retrorectal dehiscence abcess with drain placement and irrigation;  Surgeon: Geronimo Esparza MD;  [facility];  Service: General;  Laterality: N/A;    PAP SMEAR N/A 01/24/2014    SIGMOIDOSCOPY N/A 07/24/2019    INFILTRATIVE PARTIALLY OBSTRUCTING LARGE RECTAL CANCER, AREA TATOOED, DR. GERONIMO ESPARZA AT [facility]    SIGMOIDOSCOPY N/A 11/23/2019    AN ULCERATED PARTIALLY OBSTRUCTING MASS IN MID RECTUM, AREA TATTOOED, DR. GERONIMO ESPARZA AT [facility]    SIGMOIDOSCOPY N/A 07/22/2021    PATENT ANASTAMOSIS IN DISTAL RECTUM, RESCOPE IN 1 YR, DR. GERONIMO ESPARZA AT [facility]    SIGMOIDOSCOPY N/A 09/11/2024    PATCHY INFLAMMATION AND EDEMA IN DESCENDING, AREA BIOPSIED AND WAS BENIGN, PATENT AND HEALTHY ANASTAMOSIS, HEMORRHOIDS,RESCOPE(CY) 12/2025, DR. GERONIMO ESPARZA AT [facility]    TRANSRECTAL ULTRASOUND N/A 07/24/2019    Procedure: ULTRASOUND TRANSRECTAL;  Surgeon: Geronimo Esparza MD;  Location: [facility] ENDOSCOPY;  Service: General    URETEROSCOPY LASER LITHOTRIPSY WITH STENT INSERTION Right 10/30/2019    DR. ESTUARDO BEASLEY AT [facility]    VAGINAL DELIVERY N/A 09/18/1998    VENOUS ACCESS DEVICE (PORT) INSERTION Left 08/09/2019    Procedure: INSERTION VENOUS ACCESS DEVICE;  Surgeon: Sj Joseph MD;  Location:  [facility];  Service: General    VENOUS ACCESS DEVICE (PORT) INSERTION N/A 12/18/2020    Procedure: INSERTION VENOUS ACCESS DEVICE right side, removal venous access device left side;  Surgeon: Sj Joseph MD;  Location:  [facility];  Service: General;  Laterality: N/A;    WISDOM TOOTH EXTRACTION Bilateral 1993       family history includes Atrial fibrillation in her father; Cancer in her paternal aunt and paternal uncle; Colon cancer in her maternal uncle and  "paternal uncle; Colon polyps in her mother; Diabetes in her paternal uncle; Drug abuse in her sister; Factor V Leiden deficiency in her mother; Heart disease in her mother and paternal grandfather; Hyperlipidemia in her mother; Hypertension in her mother; Mental illness in her sister; No Known Problems in her son; Skin cancer in her father.     reports that she has been smoking cigarettes. She has a 25 pack-year smoking history. She has been exposed to tobacco smoke. She has never used smokeless tobacco. She reports current alcohol use. She reports that she does not use drugs.     No Known Allergies    Medication:  Antibiotics:  Anti-Infectives (From admission, onward)      None              Objective     Physical Exam:   Vital Signs   Temp:  [97.5 °F (36.4 °C)-97.7 °F (36.5 °C)] 97.5 °F (36.4 °C)  Heart Rate:  [61-77] 61  Resp:  [16-20] 20  BP: (129-142)/(78-85) 142/78    GENERAL: Awake and alert, in no acute distress.   HEENT: Oropharynx is clear. Hearing is grossly normal.   EYES: PERRL. No conjunctival injection. No lid lag.   LUNGS: Normal work of breathing.  SKIN: Warm and dry without cutaneous eruptions in exposed areas.  PSYCHIATRIC: Appropriate mood, affect, insight, and judgment.     Results Review:   I reviewed the patient's new clinical results.    Lab Results   Component Value Date    WBC 4.63 07/21/2025    HGB 13.4 07/21/2025    HCT 40.3 07/21/2025    MCV 96.9 07/21/2025     07/21/2025       No results found for: \"VANCOPEAK\", \"VANCOTROUGH\", \"VANCORANDOM\"    Lab Results   Component Value Date    GLUCOSE 102 (H) 07/21/2025    BUN 7.0 07/21/2025    CREATININE 0.65 07/21/2025    EGFRIFNONA 79 12/21/2021    BCR 10.8 07/21/2025    CO2 23.0 07/21/2025    CALCIUM 8.8 07/21/2025    ALBUMIN 3.7 07/21/2025    AST 52 (H) 07/21/2025    ALT 32 07/21/2025         Estimated Creatinine Clearance: 115.3 mL/min (by C-G formula based on SCr of 0.65 mg/dL).    Isolation:   Contact " Spore      Microbiology:  Microbiology Results (last 10 days)       Procedure Component Value - Date/Time    Gastrointestinal Panel, PCR - Stool, Per Rectum [[id number]]  (Abnormal) Collected: 07/21/25 0450    Lab Status: Final result Specimen: Stool from Per Rectum Updated: 07/21/25 0826     Campylobacter Not Detected     Plesiomonas shigelloides Not Detected     Salmonella Not Detected     Vibrio Not Detected     Vibrio cholerae Not Detected     Yersinia enterocolitica Not Detected     Enteroaggregative E. coli (EAEC) Not Detected     Enteropathogenic E. coli (EPEC) Detected     Enterotoxigenic E. coli (ETEC) lt/st Not Detected     Shiga-like toxin-producing E. coli (STEC) stx1/stx2 Not Detected     Shigella/Enteroinvasive E. coli (EIEC) Not Detected     Cryptosporidium Not Detected     Cyclospora cayetanensis Not Detected     Entamoeba histolytica Not Detected     Giardia lamblia Not Detected     Adenovirus F40/41 Not Detected     Astrovirus Not Detected     Norovirus GI/GII Not Detected     Rotavirus A Not Detected     Sapovirus (I, II, IV or V) Not Detected    Clostridioides difficile Toxin - Stool, Per Rectum [[id number]]  (Normal) Collected: 07/21/25 0450    Lab Status: Final result Specimen: Stool from Per Rectum Updated: 07/21/25 0705    Narrative:      The following orders were created for panel order Clostridioides difficile Toxin - Stool, Per Rectum.  Procedure                               Abnormality         Status                     ---------                               -----------         ------                     Clostridioides difficile...[[id number]]  Normal              Final result                 Please view results for these tests on the individual orders.    Clostridioides difficile Toxin, PCR - Stool, Per Rectum [[id number]]  (Normal) Collected: 07/21/25 0450    Lab Status: Final result Specimen: Stool from Per Rectum Updated: 07/21/25 0705     Toxigenic C. difficile by PCR Negative     Narrative:      The result indicates the absence of toxigenic C. difficile from stool specimen.              Radiology:      Assessment   #Diarrhea  #Enteropathogenic E. coli  #Metastatic rectal cancer currently on chemotherapy  #Immunosuppressed on chemotherapy     Presenting with diarrhea with known prior chemotherapy side effect of intractable diarrhea but found to have GI pathogen panel positive for enteropathogenic E. coli.      Treatment is typically supportive but given patient's refractory symptoms in the setting of immunosuppression it is not unreasonable to treat empirically with azithromycin 1 g one-time dose.    Thank you for allowing me to be involved in the care of this patient. Infectious diseases will sign off at this time with antibiotics plan in place, but please call me at [phone] if any further ID questions or new ID concerns.    ------------------------------------------------------------------------------------------------  The above decisions regarding prescribing and/or discontinuation of antimicrobials incorporate elements of engaging in complex medical decision-making associated with antimicrobial therapy including consideration of patient-specific resistance patterns, consideration of local resistance patterns, the potential to develop resistant strains or new infections while on antimicrobial therapy, patient's recent antibiotic exposure or lack thereof, interactions between antibiotics and other medications, consideration of patient's other co-morbidities in prescribing antibiotic therapy, public health considerations such as risk of transmission of infection or lack thereof, and public health considerations to minimize development of antimicrobial resistance in the community.     The patient was provided education re: his or her specific antimicrobial selection including indications, benefits, and risks.

## 2025-07-22 ENCOUNTER — READMISSION MANAGEMENT (OUTPATIENT)
Dept: CALL CENTER | Facility: HOSPITAL | Age: 45
End: 2025-07-22
Payer: COMMERCIAL

## 2025-07-22 VITALS
OXYGEN SATURATION: 95 % | WEIGHT: 172.18 LBS | HEART RATE: 63 BPM | BODY MASS INDEX: 27.67 KG/M2 | DIASTOLIC BLOOD PRESSURE: 74 MMHG | RESPIRATION RATE: 16 BRPM | TEMPERATURE: 98.1 F | HEIGHT: 66 IN | SYSTOLIC BLOOD PRESSURE: 119 MMHG

## 2025-07-22 LAB
ALBUMIN SERPL-MCNC: 3.3 G/DL (ref 3.5–5.2)
ALBUMIN/GLOB SERPL: 1.3 G/DL
ALP SERPL-CCNC: 117 U/L (ref 39–117)
ALT SERPL W P-5'-P-CCNC: 56 U/L (ref 1–33)
ANION GAP SERPL CALCULATED.3IONS-SCNC: 8 MMOL/L (ref 5–15)
AST SERPL-CCNC: 69 U/L (ref 1–32)
BASOPHILS # BLD AUTO: 0.01 10*3/MM3 (ref 0–0.2)
BASOPHILS NFR BLD AUTO: 0.2 % (ref 0–1.5)
BILIRUB SERPL-MCNC: 0.2 MG/DL (ref 0–1.2)
BUN SERPL-MCNC: 5 MG/DL (ref 6–20)
BUN/CREAT SERPL: 9.8 (ref 7–25)
CALCIUM SPEC-SCNC: 8.4 MG/DL (ref 8.6–10.5)
CHLORIDE SERPL-SCNC: 112 MMOL/L (ref 98–107)
CO2 SERPL-SCNC: 22 MMOL/L (ref 22–29)
CREAT SERPL-MCNC: 0.51 MG/DL (ref 0.57–1)
DEPRECATED RDW RBC AUTO: 43.9 FL (ref 37–54)
EGFRCR SERPLBLD CKD-EPI 2021: 117.5 ML/MIN/1.73
EOSINOPHIL # BLD AUTO: 0.08 10*3/MM3 (ref 0–0.4)
EOSINOPHIL NFR BLD AUTO: 1.8 % (ref 0.3–6.2)
ERYTHROCYTE [DISTWIDTH] IN BLOOD BY AUTOMATED COUNT: 12.4 % (ref 12.3–15.4)
GLOBULIN UR ELPH-MCNC: 2.5 GM/DL
GLUCOSE SERPL-MCNC: 97 MG/DL (ref 65–99)
HCT VFR BLD AUTO: 39.3 % (ref 34–46.6)
HGB BLD-MCNC: 12.7 G/DL (ref 12–15.9)
IMM GRANULOCYTES # BLD AUTO: 0.01 10*3/MM3 (ref 0–0.05)
IMM GRANULOCYTES NFR BLD AUTO: 0.2 % (ref 0–0.5)
LYMPHOCYTES # BLD AUTO: 0.96 10*3/MM3 (ref 0.7–3.1)
LYMPHOCYTES NFR BLD AUTO: 21.9 % (ref 19.6–45.3)
MCH RBC QN AUTO: 31.1 PG (ref 26.6–33)
MCHC RBC AUTO-ENTMCNC: 32.3 G/DL (ref 31.5–35.7)
MCV RBC AUTO: 96.3 FL (ref 79–97)
MONOCYTES # BLD AUTO: 0.33 10*3/MM3 (ref 0.1–0.9)
MONOCYTES NFR BLD AUTO: 7.5 % (ref 5–12)
NEUTROPHILS NFR BLD AUTO: 2.99 10*3/MM3 (ref 1.7–7)
NEUTROPHILS NFR BLD AUTO: 68.4 % (ref 42.7–76)
PLATELET # BLD AUTO: 133 10*3/MM3 (ref 140–450)
PMV BLD AUTO: 10 FL (ref 6–12)
POTASSIUM SERPL-SCNC: 4.3 MMOL/L (ref 3.5–5.2)
PROT SERPL-MCNC: 5.8 G/DL (ref 6–8.5)
RBC # BLD AUTO: 4.08 10*6/MM3 (ref 3.77–5.28)
SODIUM SERPL-SCNC: 142 MMOL/L (ref 136–145)
WBC NRBC COR # BLD AUTO: 4.38 10*3/MM3 (ref 3.4–10.8)

## 2025-07-22 PROCEDURE — 25010000002 HYDROMORPHONE PER 4 MG: Performed by: NURSE PRACTITIONER

## 2025-07-22 PROCEDURE — 99214 OFFICE O/P EST MOD 30 MIN: CPT | Performed by: PHYSICIAN ASSISTANT

## 2025-07-22 PROCEDURE — 25010000002 OCTREOTIDE PER 25 MCG: Performed by: INTERNAL MEDICINE

## 2025-07-22 PROCEDURE — 96372 THER/PROPH/DIAG INJ SC/IM: CPT

## 2025-07-22 PROCEDURE — 25010000002 SODIUM CHLORIDE 0.9 % WITH KCL 20 MEQ 20-0.9 MEQ/L-% SOLUTION: Performed by: HOSPITALIST

## 2025-07-22 PROCEDURE — 99232 SBSQ HOSP IP/OBS MODERATE 35: CPT | Performed by: INTERNAL MEDICINE

## 2025-07-22 PROCEDURE — G0378 HOSPITAL OBSERVATION PER HR: HCPCS

## 2025-07-22 PROCEDURE — 25010000002 ENOXAPARIN PER 10 MG: Performed by: HOSPITALIST

## 2025-07-22 PROCEDURE — 80053 COMPREHEN METABOLIC PANEL: CPT | Performed by: HOSPITALIST

## 2025-07-22 PROCEDURE — 96376 TX/PRO/DX INJ SAME DRUG ADON: CPT

## 2025-07-22 PROCEDURE — 85025 COMPLETE CBC W/AUTO DIFF WBC: CPT | Performed by: HOSPITALIST

## 2025-07-22 PROCEDURE — 96361 HYDRATE IV INFUSION ADD-ON: CPT

## 2025-07-22 PROCEDURE — 81232 DPYD GENE COMMON VARIANTS: CPT | Performed by: INTERNAL MEDICINE

## 2025-07-22 RX ORDER — PANTOPRAZOLE SODIUM 40 MG/1
40 TABLET, DELAYED RELEASE ORAL DAILY PRN
Qty: 90 TABLET | Refills: 1 | Status: SHIPPED | OUTPATIENT
Start: 2025-07-22 | End: 2025-08-01 | Stop reason: HOSPADM

## 2025-07-22 RX ADMIN — FAMOTIDINE 20 MG: 20 TABLET, FILM COATED ORAL at 09:25

## 2025-07-22 RX ADMIN — DIPHENOXYLATE HYDROCHLORIDE AND ATROPINE SULFATE 2 TABLET: 2.5; .025 TABLET ORAL at 09:25

## 2025-07-22 RX ADMIN — HYDROMORPHONE HYDROCHLORIDE 0.5 MG: 1 INJECTION, SOLUTION INTRAMUSCULAR; INTRAVENOUS; SUBCUTANEOUS at 00:02

## 2025-07-22 RX ADMIN — POTASSIUM CHLORIDE AND SODIUM CHLORIDE 100 ML/HR: 900; 150 INJECTION, SOLUTION INTRAVENOUS at 02:25

## 2025-07-22 RX ADMIN — HYDROCORTISONE: 25 CREAM TOPICAL at 09:26

## 2025-07-22 RX ADMIN — ESCITALOPRAM 10 MG: 10 TABLET, FILM COATED ORAL at 09:25

## 2025-07-22 RX ADMIN — DICYCLOMINE HYDROCHLORIDE 20 MG: 10 CAPSULE ORAL at 09:25

## 2025-07-22 RX ADMIN — LOPERAMIDE HYDROCHLORIDE 2 MG: 2 CAPSULE ORAL at 09:25

## 2025-07-22 RX ADMIN — ENOXAPARIN SODIUM 80 MG: 100 INJECTION SUBCUTANEOUS at 09:26

## 2025-07-22 RX ADMIN — Medication 10 ML: at 09:25

## 2025-07-22 RX ADMIN — OCTREOTIDE ACETATE 100 MCG: 100 INJECTION, SOLUTION INTRAVENOUS; SUBCUTANEOUS at 00:02

## 2025-07-22 RX ADMIN — Medication 10 ML: at 00:06

## 2025-07-22 NOTE — OUTREACH NOTE
Prep Survey      Flowsheet Row Responses   [facility] patient discharged from? [place]   Is LACE score < 7 ? No   Eligibility [facility]   Date of Admission 07/21/25   Date of Discharge 07/22/25   Discharge diagnosis Diarrhea   Does the patient have one of the following disease processes/diagnoses(primary or secondary)? Other   Does the patient have Home health ordered? No   Is there a DME ordered? No   Prep survey completed? Yes            NAYELI CORREA - Registered Nurse

## 2025-07-22 NOTE — DISCHARGE SUMMARY
[facility]  [phone]    DISCHARGE SUMMARY  [facility]    Patient Identification:  Name: Carole Weaver  Age: 44 y.o.  Sex: female  :  1980  MRN: [id number]  Primary Care Physician: Deepika Vela III, KRISTINEC    Admit date: 2025  Discharge date and time:      Discharge Diagnoses:  Diarrhea       History of present illness from H&P:    Stated Reason for Visit: Patient from home with report of diarrhea. She is a stage 4 colorectal cancer patient and is scheduled for surgery for permanent colostomy. She reports her diarrhea was better this morning but has now returned to all liquid and feels like it is burning her rectum.  History Obtained From: patient     44-year-old female with stage IV adenocarcinoma of the rectum, factor V Leiden who comes to the hospital because of of worsening diarrhea.  Plan was for patient to have colostomy placed in about a week with Dr. Ye.     Patient uses octreotide at home 3 times daily along with Imodium and Lomotil.  Despite this the patient has had persistent severe diarrhea the last couple weeks.  Patient went to the emergency room the night before the had some improvement in her diarrhea but then last night became severe again and comes to the hospital further evaluation.    Hospital Course:   Patient was admitted to the hospital because of worsening diarrhea and was found to have E. coli on GI PCR panel. Patient was given a dose of Zithromax.  By the following day the patient is improving.  She does have chronic diarrhea for which she is on Lomotil and Imodium regularly at home along with octreotide.  Patient has a plan for colostomy on .    Hypokalemia, magnesium is good  -replace     Dvt/PE (last clot in dec 2021) history of factor V Leiden  - Continue with lovenox     Discussed with Dr. Ye and with Dr. Shannon (genetic testing done prior to discharge will need followed up with Dr. Shannon  outpatient)    Patient ate a moderate amount of dinner last night did well with breakfast this morning, diarrhea is currently controlled.  Patient is very anxious for discharge       The patient was seen and examined on the day of discharge.    Consults:   Consults       Date and Time Order Name Status Description    7/21/2025  8:48 AM Inpatient Infectious Diseases Consult Completed     7/21/2025  5:18 AM Inpatient Colorectal Surgery Consult Completed     7/21/2025  5:18 AM Hematology & Oncology Inpatient Consult Completed     7/21/2025  4:58 AM LHBRAN (on-call MD unless specified) Details              Results from last 7 days   Lab Units 07/21/25  0422   WBC 10*3/mm3 4.63   HEMOGLOBIN g/dL 13.4   HEMATOCRIT % 40.3   PLATELETS 10*3/mm3 193       Results from last 7 days   Lab Units 07/21/25 1952 07/21/25  0422   SODIUM mmol/L  --  142   POTASSIUM mmol/L 3.8 3.0*   CHLORIDE mmol/L  --  107   CO2 mmol/L  --  23.0   BUN mg/dL  --  7.0   CREATININE mg/dL  --  0.65   GLUCOSE mg/dL  --  102*   CALCIUM mg/dL  --  8.8       Significant Diagnostic Studies:   WBC   Date Value Ref Range Status   07/22/2025 4.38 3.40 - 10.80 10*3/mm3 Final     Hemoglobin   Date Value Ref Range Status   07/22/2025 12.7 12.0 - 15.9 g/dL Final     Hematocrit   Date Value Ref Range Status   07/22/2025 39.3 34.0 - 46.6 % Final     Platelets   Date Value Ref Range Status   07/22/2025 133 (L) 140 - 450 10*3/mm3 Final     Sodium   Date Value Ref Range Status   07/22/2025 142 136 - 145 mmol/L Final     Potassium   Date Value Ref Range Status   07/22/2025 4.3 3.5 - 5.2 mmol/L Final     Chloride   Date Value Ref Range Status   07/22/2025 112 (H) 98 - 107 mmol/L Final     CO2   Date Value Ref Range Status   07/22/2025 22.0 22.0 - 29.0 mmol/L Final     BUN   Date Value Ref Range Status   07/22/2025 5.0 (L) 6.0 - 20.0 mg/dL Final     Creatinine   Date Value Ref Range Status   07/22/2025 0.51 (L) 0.57 - 1.00 mg/dL Final     Glucose   Date Value Ref Range Status  "  07/22/2025 97 65 - 99 mg/dL Final     Calcium   Date Value Ref Range Status   07/22/2025 8.4 (L) 8.6 - 10.5 mg/dL Final     Magnesium   Date Value Ref Range Status   07/21/2025 2.0 1.6 - 2.6 mg/dL Final     AST (SGOT)   Date Value Ref Range Status   07/22/2025 69 (H) 1 - 32 U/L Final     ALT (SGPT)   Date Value Ref Range Status   07/22/2025 56 (H) 1 - 33 U/L Final     Alkaline Phosphatase   Date Value Ref Range Status   07/22/2025 117 39 - 117 U/L Final     No results found for: \"APTT\", \"INR\"  Color, UA   Date Value Ref Range Status   07/20/2025 Dark Yellow (A) Yellow, Straw Final     Appearance, UA   Date Value Ref Range Status   07/20/2025 Clear Clear Final     pH, UA   Date Value Ref Range Status   07/20/2025 5.5 5.0 - 8.0 Final     Glucose, UA   Date Value Ref Range Status   07/20/2025 Negative Negative Final     Ketones, UA   Date Value Ref Range Status   07/20/2025 Trace (A) Negative Final     Blood, UA   Date Value Ref Range Status   07/20/2025 Negative Negative Final     Leuk Esterase, UA   Date Value Ref Range Status   07/20/2025 Trace (A) Negative Final     Bilirubin, UA   Date Value Ref Range Status   07/20/2025 Negative Negative Final     Urobilinogen, UA   Date Value Ref Range Status   07/20/2025 1.0 E.U./dL 0.2 - 1.0 E.U./dL Final     RBC, UA   Date Value Ref Range Status   07/20/2025 0-2 None Seen, 0-2 /HPF Final     WBC, UA   Date Value Ref Range Status   07/20/2025 0-2 None Seen, 0-2 /HPF Final     Bacteria, UA   Date Value Ref Range Status   07/20/2025 1+ (A) None Seen /HPF Final     No results found for: \"TROPONINT\", \"TROPONINI\", \"BNP\"  No components found for: \"HGBA1C;2\"  No components found for: \"TSH;2\"    Imaging Results (All)       None        No results found for: \"SITE\", \"ALLENTEST\", \"PHART\", \"WMU2DLK\", \"PO2ART\", \"XCU1NJF\", \"BASEEXCESS\", \"W9RNYIPX\", \"HGBBG\", \"HCTABG\", \"OXYHEMOGLOBI\", \"METHHGBN\", \"CARBOXYHGB\", \"CO2CT\", \"BAROMETRIC\", \"MODALITY\", \"FIO2\"       Discharge Medications    " "    Changes to Medications        Instructions Start Date   nystatin-zinc oxide-hydrocortisone-bacitracin  What changed: how much to take   Apply topically to the appropriate area as directed Daily As Needed.      pantoprazole 40 MG EC tablet  Commonly known as: PROTONIX  What changed:   when to take this  reasons to take this   40 mg, Oral, Daily PRN             Continue These Medications        Instructions Start Date   BD SafetyGlide Syringe/Needle 27G X 5/8\" 1 ML misc  Generic drug: Syringe/Needle (Disp)   1 mL, Not Applicable, 3 Times Daily PRN      clonazePAM 1 MG tablet  Commonly known as: KlonoPIN   TAKE 1 TABLET AS NEEDED DAILY FOR ANXIETY. MAY TAKE ONE ADDITIONAL AS NEEDED FOR SEVERE ANXIETY.      dicyclomine 20 MG tablet  Commonly known as: BENTYL   TAKE 1 TABLET BY MOUTH EVERY 6 (SIX) HOURS AS NEEDED FOR IRRITABLE BOWEL SYMPTOMS      diphenoxylate-atropine 2.5-0.025 MG per tablet  Commonly known as: LOMOTIL   TAKE 2 TABLETS BY MOUTH 4 TIMES A DAY      enoxaparin sodium 100 MG/ML solution prefilled syringe syringe  Commonly known as: LOVENOX   1 mg/kg (100 mg), Subcutaneous, Every 12 Hours Scheduled      escitalopram 10 MG tablet  Commonly known as: LEXAPRO   10 mg, Oral, Daily      famotidine 20 MG tablet  Commonly known as: PEPCID   TAKE 1 TABLET BY MOUTH TWICE A DAY      HYDROcodone-acetaminophen 5-325 MG per tablet  Commonly known as: NORCO   2 tablets, Oral, Every 6 Hours PRN      Hydrocortisone (Perianal) 2.5 % rectal cream  Commonly known as: ANUSOL-HC   Apply rectally 3 times daily.  Include applicator.      hydrocortisone 2.5 % cream   APPLY RECTALLY 3 TIMES DAILY. INCLUDE APPLICATOR.      IMODIUM PO   As Needed      Loratadine 10 MG capsule   10 mg, Every Evening      metoprolol succinate XL 25 MG 24 hr tablet  Commonly known as: TOPROL-XL   12.5 mg, Oral, Nightly      octreotide 100 MCG/ML injection  Commonly known as: sandoSTATIN   INJECT 1 ML UNDER THE SKIN INTO THE APPROPRIATE AREA AS " DIRECTED 3 TIMES A DAY      ondansetron 8 MG tablet  Commonly known as: ZOFRAN   TAKE 1 TABLET BY MOUTH THREE TIMES A DAY AS NEEDED FOR NAUSEA AND VOMITING      rosuvastatin 5 MG tablet  Commonly known as: CRESTOR   5 mg, Oral, Daily, NEEDS AN APPOINTMENT FOR FUTURE REFILLS      VITAMIN B-12 PO   1 tablet, 3 Times Weekly             Stop These Medications      nystatin 577334 UNIT/GM cream  Commonly known as: MYCOSTATIN                Patient Instructions:       Future Appointments   Date Time Provider Department Center   7/23/2025 10:00 AM INFUSION ACCESS CHAIR- [facility]  [facility]   7/23/2025 10:20 AM Gaby Cruz APRN [facility]   7/23/2025 10:45 AM CHAIR 2A [facility] - [facility] IN[facility]US [facility]   8/11/2025 10:30 AM Charissa Messina PA-C [facility]  [facility]   8/15/2025  9:15 AM INFUSION ACCESS CHAIR- [facility]  [facility]   8/15/2025 10:30 AM TREVON UCE CT 1 BH TREVON CT E UCE   8/22/2025 10:15 AM INFUSION ACCESS CHAIR- [facility]  [facility]   8/22/2025 10:40 AM Dong Nguyen MD PhD [facility]   8/22/2025 11:15 AM CHAIR 16 [facility] - [facility]  [facility]         Follow-up Information       Deepika Vela III, NP-C .    Specialty: Internal Medicine  Contact information:  [address] [place] [address]  [phone]                             Discharge Order (From admission, onward)       Start     Ordered    07/22/25 0903  Discharge patient  Once        Expected Discharge Date: 07/22/25   Discharge Disposition: Home or Self Care   Physician of Record for Attribution - Please select from Treatment Team: RAMANA STANLEY [[id number]]   Review needed by CMO to determine Physician of Record: No      Question Answer Comment   Physician of Record for Attribution - Please select from Treatment Team RAMANA STANLEY    Review needed by CMO to determine Physician of Record No        07/22/25 0908                    Diet Order   Procedures    Diet: Regular/House;  Fluid Consistency: Thin (IDDSI 0)       TEST  RESULTS PENDING AT DISCHARGE  Pending Labs       Order Current Status    CBC & Differential Collected (07/22/25 0859)    CBC Auto Differential Collected (07/22/25 0859)    Comprehensive Metabolic Panel Collected (07/22/25 0859)                  Total time spent discharging patient including evaluation, post hospitalization follow up,  medication and post hospitalization instructions and education, total time exceeds 30 minutes.    Signed:  Aniceto Hutton MD  7/22/2025  09:09 EDT

## 2025-07-22 NOTE — PLAN OF CARE
[id number]:  Patient A&O x4.  Able to verbalize needs appropriately. Medicated for pain x2 with good results. Loose stools have decreased overnight with a total of six episodes prior to midnight, then no additional stools noted since. Cream at bedside and applied to rectal area by patient for skin excoriation. Denies SOB, nausea, or dizziness.  VSS, afebrile. Mother remains at bedside. Frequent checks continued to maintain safety and comfort.    Problem: Adult Inpatient Plan of Care  Goal: Plan of Care Review  Outcome: Progressing  Flowsheets (Taken 7/21/2025 2244)  Progress: improving  Goal: Patient-Specific Goal (Individualized)  Outcome: Progressing  Goal: Absence of Hospital-Acquired Illness or Injury  Outcome: Progressing  Intervention: Identify and Manage Fall Risk  Recent Flowsheet Documentation  Taken 7/21/2025 1957 by Jamilah Neff RN  Safety Promotion/Fall Prevention:   activity supervised   assistive device/personal items within reach   clutter free environment maintained   fall prevention program maintained   lighting adjusted   nonskid shoes/slippers when out of bed   room organization consistent   safety round/check completed  Intervention: Prevent Skin Injury  Recent Flowsheet Documentation  Taken 7/21/2025 1957 by Jamilah Neff RN  Body Position: position changed independently  Skin Protection: skin sealant/moisture barrier applied  Intervention: Prevent Infection  Recent Flowsheet Documentation  Taken 7/21/2025 1957 by Jamilah Neff RN  Infection Prevention:   cohorting utilized   environmental surveillance performed   hand hygiene promoted  Goal: Optimal Comfort and Wellbeing  Outcome: Progressing  Intervention: Monitor Pain and Promote Comfort  Recent Flowsheet Documentation  Taken 7/21/2025 1948 by Jamilah Neff RN  Pain Management Interventions:   pain medication given   position adjusted   quiet environment facilitated  Intervention: Provide Person-Centered Care  Recent Flowsheet  Documentation  Taken 7/21/2025 1957 by Jamilah Neff RN  Trust Relationship/Rapport:   care explained   choices provided   emotional support provided   empathic listening provided   questions answered   questions encouraged   reassurance provided   thoughts/feelings acknowledged  Goal: Readiness for Transition of Care  Outcome: Progressing     Problem: Malnutrition  Goal: Improved Nutritional Intake  Outcome: Progressing     Problem: Oncology Care  Goal: Effective Coping  Outcome: Progressing  Intervention: Support and Enhance Coping Strategies  Recent Flowsheet Documentation  Taken 7/21/2025 1957 by Jamilah Neff RN  Supportive Measures:   active listening utilized   decision-making supported   positive reinforcement provided   relaxation techniques promoted  Environmental Support: calm environment promoted  Family/Support System Care:   caregiver stress acknowledged   self-care encouraged  Goal: Improved Activity Tolerance  Outcome: Progressing  Intervention: Promote Improved Energy  Recent Flowsheet Documentation  Taken 7/21/2025 1957 by Jamilah Neff RN  Activity Management: up ad dmitri  Environmental Support: calm environment promoted  Fatigue Management:   activity schedule adjusted   activity assistance provided  Sleep/Rest Enhancement:   awakenings minimized   consistent schedule promoted   family presence promoted  Goal: Optimal Oral Intake  Outcome: Progressing  Goal: Improved Oral Mucous Membrane Integrity  Outcome: Progressing  Goal: Optimal Pain Control and Function  Outcome: Progressing  Intervention: Develop Pain Management Plan  Recent Flowsheet Documentation  Taken 7/21/2025 1948 by Jamilah Neff RN  Pain Management Interventions:   pain medication given   position adjusted   quiet environment facilitated  Intervention: Prevent or Manage Pain  Recent Flowsheet Documentation  Taken 7/21/2025 1957 by Jamilah Neff RN  Sleep/Rest Enhancement:   awakenings minimized   consistent schedule promoted   family  presence promoted  Medication Review/Management: medications reviewed  Intervention: Optimize Psychosocial Wellbeing  Recent Flowsheet Documentation  Taken 7/21/2025 1957 by Jamilah Neff RN  Supportive Measures:   active listening utilized   decision-making supported   positive reinforcement provided   relaxation techniques promoted  Diversional Activities: television     Problem: Fall Injury Risk  Goal: Absence of Fall and Fall-Related Injury  Outcome: Progressing  Intervention: Identify and Manage Contributors  Recent Flowsheet Documentation  Taken 7/21/2025 1957 by Jamilah Neff RN  Medication Review/Management: medications reviewed  Intervention: Promote Injury-Free Environment  Recent Flowsheet Documentation  Taken 7/21/2025 1957 by Jamilah Neff RN  Safety Promotion/Fall Prevention:   activity supervised   assistive device/personal items within reach   clutter free environment maintained   fall prevention program maintained   lighting adjusted   nonskid shoes/slippers when out of bed   room organization consistent   safety round/check completed   Goal Outcome Evaluation:           Progress: improving

## 2025-07-22 NOTE — PROGRESS NOTES
Progress Note - Colon and Rectal Surgery    LOS 1      S: Pt feeling much better today. Was able to tolerate dinner last night without N/V. Perianal region is still sore, but she denies any abdominal pain. Was able to get rest last night for the first time in awhile. Denies any new concerns at this time.     O:  Temp:  [97.7 °F (36.5 °C)-98.2 °F (36.8 °C)] 98.1 °F (36.7 °C)  Heart Rate:  [49-64] 49  Resp:  [16-20] 16  BP: (108-132)/(69-81) 132/69      Intake/Output Summary (Last 24 hours) at 7/22/2025 0750  Last data filed at 7/22/2025 0606  Gross per 24 hour   Intake 2118.33 ml   Output --   Net 2118.33 ml     No acute distress  Abd: Soft    A/P: 44 y.o. female with metastatic rectal cancer currently admitted for EPEC.    - ID consulted for EPEC and Pt empirically with one-time dose of Azithromycin   - Hypokalemia due to diarrhea. Potassium replaced and improved to 3.8.   - Morning labs need to be collected.  - Pt scheduled for a LAR with ostomy creation 07/29/2025.  - Okay to D/C home today from a CRS standpoint.

## 2025-07-22 NOTE — PROGRESS NOTES
Subjective     HISTORY OF PRESENT ILLNESS:   No acute issues overnight.  Patient states that her diarrhea has resolved.  Able to tolerate diet well.  Vital stable      Past Medical History, Past Surgical History, Social History, Family History have been reviewed and are without significant changes except as mentioned.    Review of Systems   A comprehensive 14 point review of systems was performed and was negative except as mentioned.    Medications:  The current medication list was reviewed in the EMR    ALLERGIES:  No Known Allergies    Objective      Vitals:    07/21/25 1616 07/21/25 1957 07/22/25 0402 07/22/25 0930   BP: 116/73 108/77 132/69 119/74   BP Location: Left arm Left arm Left arm Left arm   Patient Position: Sitting Lying Lying Sitting   Pulse: 64 64 (!) 49 63   Resp: 20 18 16 16   Temp: 98.1 °F (36.7 °C) 98.2 °F (36.8 °C) 98.1 °F (36.7 °C) 98.1 °F (36.7 °C)   TempSrc: Oral Oral Oral Oral   SpO2: 96% 95% 96% 95%   Weight:       Height:             6/25/2025     8:44 AM   Current Status   ECOG score 0       Physical Exam    CONSTITUTIONAL:  Vital signs reviewed.  No distress, looks comfortable.  EYES:  Conjunctivae and lids unremarkable.    EARS,NOSE,MOUTH,THROAT:  Ears and nose appear unremarkable.    RESPIRATORY:  Normal respiratory effort.   CARDIOVASCULAR:  Normal heart rate  GASTROINTESTINAL: Abdomen appears unremarkable.  Nondistended   LYMPHATIC:  No cervical, supraclavicular lymphadenopathy.  SKIN:  Warm.  No rashes.  PSYCHIATRIC:  Normal judgment and insight.  Normal mood and affect.  NEURO: AAOx3, no obvious focal deficits.      RECENT LABS:  Hematology WBC   Date Value Ref Range Status   07/22/2025 4.38 3.40 - 10.80 10*3/mm3 Final     RBC   Date Value Ref Range Status   07/22/2025 4.08 3.77 - 5.28 10*6/mm3 Final     Hemoglobin   Date Value Ref Range Status   07/22/2025 12.7 12.0 - 15.9 g/dL Final     Hematocrit   Date Value Ref Range Status   07/22/2025 39.3 34.0 - 46.6 % Final     Platelets    Date Value Ref Range Status   07/22/2025 133 (L) 140 - 450 10*3/mm3 Final              Assessment & Plan   This is a 44-year-old female with:     # Intractable diarrhea; chemotherapy (5-FU and Avastin) vs E. coli infection related:  Patient has a longstanding (> 2-3 years) history of chemotherapy related diarrhea, for which she has required multiple chemotherapy dose reductions and addition of Imodium, Lomotil PRN along with subcu octreotide at least 3 times per day  This current episode started almost a week after the last dose of chemotherapy on 6/25/2025.  Symptoms not controlled with all available PRN medications and octreotide.  GI PCR this admission is positive for enteropathogenic E. coli (EPEC).  C. difficile negative  Seen by ID with plans to administer p.o. azithromycin 1 g x 1 dose  Patient is being planned for colostomy as outpatient on 7/29/2025.  Last dose of Avastin was ~4 weeks ago, on 6/25/2025.  Will continue IV fluids and antimotility drugs  Plan to hold off on chemotherapy for now  7/22/2025: Diarrhea completely resolved.  Plan to discharge home today.  Continue antimotility medications and octreotide as ordered.  Will check DPYD and UGT1A1 mutations.     # Metastatic colorectal cancer with lung metastasis:  Follows up with  in our practice  Currently on maintenance 5-FU/Avastin therapy last dose on 6/25/2025, which was 50% dose reduced  Plan to hold off on chemotherapy until she recovers from colostomy procedure     # Hypokalemia: Replacement per primary     Recommendations:  - Will check DPYD and UGT1A1 mutations.  - Continue p.o. Imodium and Lomotil 4 times daily  - Continue subcu octreotide 100 mg 3 times daily  -Plan to hold off on chemo until colostomy procedure on 7/29/2025.  - Patient is being discharged today.  She has a follow-up appointment with our office tomorrow on 7/23/2025 at 8:20 AM.    Please call us with any questions.

## 2025-07-23 ENCOUNTER — TRANSITIONAL CARE MANAGEMENT TELEPHONE ENCOUNTER (OUTPATIENT)
Dept: CALL CENTER | Facility: HOSPITAL | Age: 45
End: 2025-07-23
Payer: COMMERCIAL

## 2025-07-23 NOTE — OUTREACH NOTE
Call Center TCM Note      Flowsheet Row Responses   [facility] [place] patient discharged from? [facility]   Does the patient have one of the following disease processes/diagnoses(primary or secondary)? Other   TCM attempt successful? Yes   Call start time 1501   Call end time 1502   Discharge diagnosis Diarrhea   Person spoke with today (if not patient) and relationship Spouse   Meds reviewed with patient/caregiver? Yes   Is the patient having any side effects they believe may be caused by any medication additions or changes? No   Does the patient have all medications ordered at discharge? N/A   Is the patient taking all medications as directed (includes completed medication regime)? Yes   Does the patient have an appointment with their PCP within 7-14 days of discharge? No   Has home health visited the patient within 72 hours of discharge? N/A   Psychosocial issues? No   What is the patient's perception of their health status since discharge? Improving   TCM call completed? Yes   Wrap up additional comments Brief call with pt's spouse that reports pt is doing well at this time   Call end time 1502   Would this patient benefit from a Referral to [facility] Social Work? No   Is the patient interested in additional calls from an ambulatory ? No            Zaida JASMINE - Registered Nurse    7/23/2025, 15:03 EDT

## 2025-07-23 NOTE — OUTREACH NOTE
Call Center TCM Note      Flowsheet Row Responses   [facility] patient discharged from? [facility]   Does the patient have one of the following disease processes/diagnoses(primary or secondary)? Other   TCM attempt successful? No   Unsuccessful attempts Attempt 1   Call Status Left message            Zaida Matos Registered Nurse    7/23/2025, 10:25 EDT

## 2025-07-23 NOTE — CASE MANAGEMENT/SOCIAL WORK
Case Management Discharge Note      Final Note: Home via private vehicle.         Selected Continued Care - Discharged on 7/22/2025 Admission date: 7/21/2025 - Discharge disposition: Home or Self Care      Destination    No services have been selected for the patient.                Durable Medical Equipment    No services have been selected for the patient.                Dialysis/Infusion    No services have been selected for the patient.                Home Medical Care    No services have been selected for the patient.                Therapy    No services have been selected for the patient.                Community Resources    No services have been selected for the patient.                Community & DME    No services have been selected for the patient.                    Transportation Services  Transportation: Private Transportation  Private: Car    Final Discharge Disposition Code: 01 - home or self-care

## 2025-07-29 ENCOUNTER — ANESTHESIA EVENT (OUTPATIENT)
Dept: PERIOP | Facility: HOSPITAL | Age: 45
End: 2025-07-29
Payer: COMMERCIAL

## 2025-07-29 ENCOUNTER — ANESTHESIA (OUTPATIENT)
Dept: PERIOP | Facility: HOSPITAL | Age: 45
End: 2025-07-29
Payer: COMMERCIAL

## 2025-07-29 ENCOUNTER — READMISSION MANAGEMENT (OUTPATIENT)
Dept: CALL CENTER | Facility: HOSPITAL | Age: 45
End: 2025-07-29
Payer: COMMERCIAL

## 2025-07-29 ENCOUNTER — HOSPITAL ENCOUNTER (INPATIENT)
Facility: HOSPITAL | Age: 45
LOS: 3 days | Discharge: HOME | End: 2025-08-01
Attending: COLON & RECTAL SURGERY | Admitting: COLON & RECTAL SURGERY
Payer: COMMERCIAL

## 2025-07-29 DIAGNOSIS — R15.9 FULL INCONTINENCE OF FECES: ICD-10-CM

## 2025-07-29 DIAGNOSIS — C20 ADENOCARCINOMA OF RECTUM: Primary | ICD-10-CM

## 2025-07-29 DIAGNOSIS — Z85.048 HISTORY OF RECTAL CANCER: ICD-10-CM

## 2025-07-29 DIAGNOSIS — C78.00 MALIGNANT NEOPLASM METASTATIC TO LUNG, UNSPECIFIED LATERALITY: ICD-10-CM

## 2025-07-29 LAB
ABO GROUP BLD: NORMAL
B-HCG UR QL: NEGATIVE
BLD GP AB SCN SERPL QL: NEGATIVE
CLINICAL INFO: NORMAL
DPYD GENE MUT ANL BLD/T: NORMAL
DPYD GENE PROD MET ACT IMP BLD/T-IMP: NORMAL
EXPIRATION DATE: NORMAL
IMP & REVIEW OF LAB RESULTS: NORMAL
INTERNAL NEGATIVE CONTROL: NEGATIVE
INTERNAL POSITIVE CONTROL: POSITIVE
Lab: NORMAL
MEDICAL DIRECTOR REVIEW: NORMAL
RH BLD: POSITIVE
T&S EXPIRATION DATE: NORMAL

## 2025-07-29 PROCEDURE — 0DTN4ZZ RESECTION OF SIGMOID COLON, PERCUTANEOUS ENDOSCOPIC APPROACH: ICD-10-PCS | Performed by: COLON & RECTAL SURGERY

## 2025-07-29 PROCEDURE — 25010000002 FENTANYL CITRATE (PF) 50 MCG/ML SOLUTION: Performed by: NURSE ANESTHETIST, CERTIFIED REGISTERED

## 2025-07-29 PROCEDURE — 25010000002 ONDANSETRON PER 1 MG: Performed by: NURSE ANESTHETIST, CERTIFIED REGISTERED

## 2025-07-29 PROCEDURE — 25010000002 ACETAMINOPHEN 10 MG/ML SOLUTION: Performed by: COLON & RECTAL SURGERY

## 2025-07-29 PROCEDURE — 0D1M4Z4 BYPASS DESCENDING COLON TO CUTANEOUS, PERCUTANEOUS ENDOSCOPIC APPROACH: ICD-10-PCS | Performed by: COLON & RECTAL SURGERY

## 2025-07-29 PROCEDURE — 25010000002 LABETALOL 5 MG/ML SOLUTION: Performed by: NURSE ANESTHETIST, CERTIFIED REGISTERED

## 2025-07-29 PROCEDURE — 25010000002 LIDOCAINE 2% SOLUTION: Performed by: NURSE ANESTHETIST, CERTIFIED REGISTERED

## 2025-07-29 PROCEDURE — 25010000002 PHENYLEPHRINE 10 MG/ML SOLUTION: Performed by: NURSE ANESTHETIST, CERTIFIED REGISTERED

## 2025-07-29 PROCEDURE — 25010000002 BUPIVACAINE (PF) 0.25 % SOLUTION: Performed by: ANESTHESIOLOGY

## 2025-07-29 PROCEDURE — 25010000002 HYDROMORPHONE 1 MG/ML SOLUTION: Performed by: COLON & RECTAL SURGERY

## 2025-07-29 PROCEDURE — 86850 RBC ANTIBODY SCREEN: CPT | Performed by: COLON & RECTAL SURGERY

## 2025-07-29 PROCEDURE — 25010000002 HYDROMORPHONE 1 MG/ML SOLUTION: Performed by: NURSE ANESTHETIST, CERTIFIED REGISTERED

## 2025-07-29 PROCEDURE — 25010000002 CEFAZOLIN PER 500 MG: Performed by: NURSE ANESTHETIST, CERTIFIED REGISTERED

## 2025-07-29 PROCEDURE — 25010000002 METRONIDAZOLE 500 MG/100ML SOLUTION: Performed by: COLON & RECTAL SURGERY

## 2025-07-29 PROCEDURE — 81025 URINE PREGNANCY TEST: CPT | Performed by: ANESTHESIOLOGY

## 2025-07-29 PROCEDURE — 25010000002 HYDROMORPHONE PER 4 MG: Performed by: COLON & RECTAL SURGERY

## 2025-07-29 PROCEDURE — 25010000002 INDOCYANINE GREEN 25 MG RECONSTITUTED SOLUTION: Performed by: NURSE ANESTHETIST, CERTIFIED REGISTERED

## 2025-07-29 PROCEDURE — 25010000002 PROPOFOL 10 MG/ML EMULSION: Performed by: NURSE ANESTHETIST, CERTIFIED REGISTERED

## 2025-07-29 PROCEDURE — 25010000002 HYDRALAZINE PER 20 MG: Performed by: NURSE ANESTHETIST, CERTIFIED REGISTERED

## 2025-07-29 PROCEDURE — 25010000002 MIDAZOLAM PER 1 MG: Performed by: ANESTHESIOLOGY

## 2025-07-29 PROCEDURE — 25010000002 HYDROMORPHONE PER 4 MG: Performed by: NURSE ANESTHETIST, CERTIFIED REGISTERED

## 2025-07-29 PROCEDURE — 0DNW4ZZ RELEASE PERITONEUM, PERCUTANEOUS ENDOSCOPIC APPROACH: ICD-10-PCS | Performed by: COLON & RECTAL SURGERY

## 2025-07-29 PROCEDURE — 25010000002 FAMOTIDINE 10 MG/ML SOLUTION: Performed by: ANESTHESIOLOGY

## 2025-07-29 PROCEDURE — 86900 BLOOD TYPING SEROLOGIC ABO: CPT | Performed by: COLON & RECTAL SURGERY

## 2025-07-29 PROCEDURE — 25810000003 LACTATED RINGERS PER 1000 ML: Performed by: ANESTHESIOLOGY

## 2025-07-29 PROCEDURE — 44206 LAP PART COLECTOMY W/STOMA: CPT | Performed by: PHYSICIAN ASSISTANT

## 2025-07-29 PROCEDURE — 25810000003 LACTATED RINGERS PER 1000 ML: Performed by: COLON & RECTAL SURGERY

## 2025-07-29 PROCEDURE — 25010000002 ENOXAPARIN PER 10 MG: Performed by: COLON & RECTAL SURGERY

## 2025-07-29 PROCEDURE — 8E0W4CZ ROBOTIC ASSISTED PROCEDURE OF TRUNK REGION, PERCUTANEOUS ENDOSCOPIC APPROACH: ICD-10-PCS | Performed by: COLON & RECTAL SURGERY

## 2025-07-29 PROCEDURE — 25810000003 SODIUM CHLORIDE 0.9 % SOLUTION: Performed by: COLON & RECTAL SURGERY

## 2025-07-29 PROCEDURE — 0DBM4ZZ EXCISION OF DESCENDING COLON, PERCUTANEOUS ENDOSCOPIC APPROACH: ICD-10-PCS | Performed by: COLON & RECTAL SURGERY

## 2025-07-29 PROCEDURE — 86901 BLOOD TYPING SEROLOGIC RH(D): CPT | Performed by: COLON & RECTAL SURGERY

## 2025-07-29 PROCEDURE — 44206 LAP PART COLECTOMY W/STOMA: CPT | Performed by: COLON & RECTAL SURGERY

## 2025-07-29 PROCEDURE — 0DQ84ZZ REPAIR SMALL INTESTINE, PERCUTANEOUS ENDOSCOPIC APPROACH: ICD-10-PCS | Performed by: COLON & RECTAL SURGERY

## 2025-07-29 PROCEDURE — 88307 TISSUE EXAM BY PATHOLOGIST: CPT | Performed by: COLON & RECTAL SURGERY

## 2025-07-29 PROCEDURE — 25010000002 DEXAMETHASONE SODIUM PHOSPHATE 20 MG/5ML SOLUTION: Performed by: NURSE ANESTHETIST, CERTIFIED REGISTERED

## 2025-07-29 PROCEDURE — 25010000002 ONDANSETRON PER 1 MG: Performed by: COLON & RECTAL SURGERY

## 2025-07-29 PROCEDURE — 25010000002 SUGAMMADEX 200 MG/2ML SOLUTION: Performed by: NURSE ANESTHETIST, CERTIFIED REGISTERED

## 2025-07-29 PROCEDURE — 25010000002 CEFAZOLIN PER 500 MG: Performed by: COLON & RECTAL SURGERY

## 2025-07-29 PROCEDURE — 25010000002 FENTANYL CITRATE (PF) 50 MCG/ML SOLUTION: Performed by: ANESTHESIOLOGY

## 2025-07-29 PROCEDURE — 25010000002 BUPIVACAINE LIPOSOME 1.3 % SUSPENSION: Performed by: ANESTHESIOLOGY

## 2025-07-29 DEVICE — SUREFORM 45 RELOAD GREEN
Type: IMPLANTABLE DEVICE | Site: ABDOMEN | Status: FUNCTIONAL
Brand: SUREFORM

## 2025-07-29 DEVICE — PROXIMATE RELOADABLE LINEAR CUTTER WITH SAFETY LOCK-OUT, 75MM
Type: IMPLANTABLE DEVICE | Site: ABDOMEN | Status: FUNCTIONAL
Brand: PROXIMATE

## 2025-07-29 RX ORDER — MIDAZOLAM HYDROCHLORIDE 1 MG/ML
1 INJECTION, SOLUTION INTRAMUSCULAR; INTRAVENOUS
Status: DISCONTINUED | OUTPATIENT
Start: 2025-07-29 | End: 2025-07-29 | Stop reason: HOSPADM

## 2025-07-29 RX ORDER — DEXAMETHASONE SODIUM PHOSPHATE 4 MG/ML
INJECTION, SOLUTION INTRA-ARTICULAR; INTRALESIONAL; INTRAMUSCULAR; INTRAVENOUS; SOFT TISSUE AS NEEDED
Status: DISCONTINUED | OUTPATIENT
Start: 2025-07-29 | End: 2025-07-29 | Stop reason: SURG

## 2025-07-29 RX ORDER — ONDANSETRON 2 MG/ML
INJECTION INTRAMUSCULAR; INTRAVENOUS AS NEEDED
Status: DISCONTINUED | OUTPATIENT
Start: 2025-07-29 | End: 2025-07-29 | Stop reason: SURG

## 2025-07-29 RX ORDER — FLUMAZENIL 0.1 MG/ML
0.2 INJECTION INTRAVENOUS AS NEEDED
Status: DISCONTINUED | OUTPATIENT
Start: 2025-07-29 | End: 2025-07-29

## 2025-07-29 RX ORDER — LIDOCAINE HYDROCHLORIDE 10 MG/ML
0.5 INJECTION, SOLUTION INFILTRATION; PERINEURAL ONCE AS NEEDED
Status: DISCONTINUED | OUTPATIENT
Start: 2025-07-29 | End: 2025-07-29 | Stop reason: HOSPADM

## 2025-07-29 RX ORDER — PHENYLEPHRINE HYDROCHLORIDE 10 MG/ML
INJECTION INTRAVENOUS AS NEEDED
Status: DISCONTINUED | OUTPATIENT
Start: 2025-07-29 | End: 2025-07-29 | Stop reason: SURG

## 2025-07-29 RX ORDER — SODIUM CHLORIDE, SODIUM LACTATE, POTASSIUM CHLORIDE, CALCIUM CHLORIDE 600; 310; 30; 20 MG/100ML; MG/100ML; MG/100ML; MG/100ML
1000 INJECTION, SOLUTION INTRAVENOUS CONTINUOUS
Status: DISCONTINUED | OUTPATIENT
Start: 2025-07-29 | End: 2025-07-29

## 2025-07-29 RX ORDER — FENTANYL CITRATE 50 UG/ML
50 INJECTION, SOLUTION INTRAMUSCULAR; INTRAVENOUS
Status: DISCONTINUED | OUTPATIENT
Start: 2025-07-29 | End: 2025-07-29

## 2025-07-29 RX ORDER — CETIRIZINE HYDROCHLORIDE 10 MG/1
10 TABLET ORAL DAILY
Status: DISCONTINUED | OUTPATIENT
Start: 2025-07-29 | End: 2025-08-01 | Stop reason: HOSPADM

## 2025-07-29 RX ORDER — NITROGLYCERIN 0.4 MG/1
0.4 TABLET SUBLINGUAL
Status: DISCONTINUED | OUTPATIENT
Start: 2025-07-29 | End: 2025-08-01 | Stop reason: HOSPADM

## 2025-07-29 RX ORDER — SODIUM CHLORIDE 0.9 % (FLUSH) 0.9 %
3-10 SYRINGE (ML) INJECTION AS NEEDED
Status: DISCONTINUED | OUTPATIENT
Start: 2025-07-29 | End: 2025-07-29 | Stop reason: HOSPADM

## 2025-07-29 RX ORDER — GABAPENTIN 300 MG/1
600 CAPSULE ORAL ONCE
Status: COMPLETED | OUTPATIENT
Start: 2025-07-29 | End: 2025-07-29

## 2025-07-29 RX ORDER — BUPIVACAINE HYDROCHLORIDE AND EPINEPHRINE 2.5; 5 MG/ML; UG/ML
INJECTION, SOLUTION EPIDURAL; INFILTRATION; INTRACAUDAL; PERINEURAL AS NEEDED
Status: DISCONTINUED | OUTPATIENT
Start: 2025-07-29 | End: 2025-07-29 | Stop reason: HOSPADM

## 2025-07-29 RX ORDER — METRONIDAZOLE 500 MG/100ML
500 INJECTION, SOLUTION INTRAVENOUS ONCE
Status: COMPLETED | OUTPATIENT
Start: 2025-07-29 | End: 2025-07-29

## 2025-07-29 RX ORDER — SODIUM CHLORIDE 9 MG/ML
50 INJECTION, SOLUTION INTRAVENOUS CONTINUOUS
Status: ACTIVE | OUTPATIENT
Start: 2025-07-29 | End: 2025-07-30

## 2025-07-29 RX ORDER — OXYCODONE AND ACETAMINOPHEN 7.5; 325 MG/1; MG/1
1 TABLET ORAL EVERY 4 HOURS PRN
Status: DISCONTINUED | OUTPATIENT
Start: 2025-07-29 | End: 2025-07-29

## 2025-07-29 RX ORDER — NALOXONE HCL 0.4 MG/ML
0.4 VIAL (ML) INJECTION
Status: DISCONTINUED | OUTPATIENT
Start: 2025-07-29 | End: 2025-08-01 | Stop reason: HOSPADM

## 2025-07-29 RX ORDER — ONDANSETRON 2 MG/ML
4 INJECTION INTRAMUSCULAR; INTRAVENOUS ONCE AS NEEDED
Status: DISCONTINUED | OUTPATIENT
Start: 2025-07-29 | End: 2025-07-29

## 2025-07-29 RX ORDER — IPRATROPIUM BROMIDE AND ALBUTEROL SULFATE 2.5; .5 MG/3ML; MG/3ML
3 SOLUTION RESPIRATORY (INHALATION) ONCE AS NEEDED
Status: DISCONTINUED | OUTPATIENT
Start: 2025-07-29 | End: 2025-07-29

## 2025-07-29 RX ORDER — PANTOPRAZOLE SODIUM 40 MG/1
40 TABLET, DELAYED RELEASE ORAL
Status: DISCONTINUED | OUTPATIENT
Start: 2025-07-30 | End: 2025-08-01 | Stop reason: HOSPADM

## 2025-07-29 RX ORDER — DROPERIDOL 2.5 MG/ML
0.62 INJECTION, SOLUTION INTRAMUSCULAR; INTRAVENOUS
Status: DISCONTINUED | OUTPATIENT
Start: 2025-07-29 | End: 2025-07-29

## 2025-07-29 RX ORDER — HYDRALAZINE HYDROCHLORIDE 20 MG/ML
5 INJECTION INTRAMUSCULAR; INTRAVENOUS
Status: DISCONTINUED | OUTPATIENT
Start: 2025-07-29 | End: 2025-07-29

## 2025-07-29 RX ORDER — NALOXONE HCL 0.4 MG/ML
0.2 VIAL (ML) INJECTION AS NEEDED
Status: DISCONTINUED | OUTPATIENT
Start: 2025-07-29 | End: 2025-07-29

## 2025-07-29 RX ORDER — METOPROLOL SUCCINATE 25 MG/1
12.5 TABLET, EXTENDED RELEASE ORAL NIGHTLY
Status: DISCONTINUED | OUTPATIENT
Start: 2025-07-29 | End: 2025-08-01 | Stop reason: HOSPADM

## 2025-07-29 RX ORDER — HYDROMORPHONE HYDROCHLORIDE 1 MG/ML
0.5 INJECTION, SOLUTION INTRAMUSCULAR; INTRAVENOUS; SUBCUTANEOUS
Status: DISCONTINUED | OUTPATIENT
Start: 2025-07-29 | End: 2025-07-29

## 2025-07-29 RX ORDER — ACETAMINOPHEN 10 MG/ML
1000 INJECTION, SOLUTION INTRAVENOUS ONCE
Status: COMPLETED | OUTPATIENT
Start: 2025-07-29 | End: 2025-07-29

## 2025-07-29 RX ORDER — PROMETHAZINE HYDROCHLORIDE 25 MG/1
25 TABLET ORAL ONCE AS NEEDED
Status: DISCONTINUED | OUTPATIENT
Start: 2025-07-29 | End: 2025-07-29

## 2025-07-29 RX ORDER — PROPOFOL 10 MG/ML
VIAL (ML) INTRAVENOUS AS NEEDED
Status: DISCONTINUED | OUTPATIENT
Start: 2025-07-29 | End: 2025-07-29 | Stop reason: SURG

## 2025-07-29 RX ORDER — LABETALOL HYDROCHLORIDE 5 MG/ML
INJECTION, SOLUTION INTRAVENOUS AS NEEDED
Status: DISCONTINUED | OUTPATIENT
Start: 2025-07-29 | End: 2025-07-29 | Stop reason: SURG

## 2025-07-29 RX ORDER — SODIUM CHLORIDE 0.9 % (FLUSH) 0.9 %
3 SYRINGE (ML) INJECTION EVERY 12 HOURS SCHEDULED
Status: DISCONTINUED | OUTPATIENT
Start: 2025-07-29 | End: 2025-07-29 | Stop reason: HOSPADM

## 2025-07-29 RX ORDER — SODIUM CHLORIDE 0.9 % (FLUSH) 0.9 %
10 SYRINGE (ML) INJECTION AS NEEDED
Status: DISCONTINUED | OUTPATIENT
Start: 2025-07-29 | End: 2025-07-29 | Stop reason: HOSPADM

## 2025-07-29 RX ORDER — FAMOTIDINE 10 MG/ML
20 INJECTION, SOLUTION INTRAVENOUS ONCE
Status: COMPLETED | OUTPATIENT
Start: 2025-07-29 | End: 2025-07-29

## 2025-07-29 RX ORDER — DIPHENHYDRAMINE HYDROCHLORIDE 50 MG/ML
12.5 INJECTION, SOLUTION INTRAMUSCULAR; INTRAVENOUS
Status: DISCONTINUED | OUTPATIENT
Start: 2025-07-29 | End: 2025-07-29

## 2025-07-29 RX ORDER — ACETAMINOPHEN 500 MG
1000 TABLET ORAL EVERY 6 HOURS
Status: DISCONTINUED | OUTPATIENT
Start: 2025-07-29 | End: 2025-08-01 | Stop reason: HOSPADM

## 2025-07-29 RX ORDER — ALVIMOPAN 12 MG/1
12 CAPSULE ORAL 2 TIMES DAILY
Status: DISCONTINUED | OUTPATIENT
Start: 2025-07-30 | End: 2025-08-01 | Stop reason: HOSPADM

## 2025-07-29 RX ORDER — SODIUM CHLORIDE, SODIUM LACTATE, POTASSIUM CHLORIDE, CALCIUM CHLORIDE 600; 310; 30; 20 MG/100ML; MG/100ML; MG/100ML; MG/100ML
9 INJECTION, SOLUTION INTRAVENOUS CONTINUOUS
Status: DISCONTINUED | OUTPATIENT
Start: 2025-07-29 | End: 2025-07-29

## 2025-07-29 RX ORDER — ALVIMOPAN 12 MG/1
12 CAPSULE ORAL ONCE
Status: COMPLETED | OUTPATIENT
Start: 2025-07-29 | End: 2025-07-29

## 2025-07-29 RX ORDER — LABETALOL HYDROCHLORIDE 5 MG/ML
5 INJECTION, SOLUTION INTRAVENOUS
Status: DISCONTINUED | OUTPATIENT
Start: 2025-07-29 | End: 2025-07-29

## 2025-07-29 RX ORDER — GABAPENTIN 400 MG/1
400 CAPSULE ORAL EVERY 8 HOURS SCHEDULED
Status: COMPLETED | OUTPATIENT
Start: 2025-07-29 | End: 2025-08-01

## 2025-07-29 RX ORDER — KETAMINE HCL IN NACL, ISO-OSM 100MG/10ML
SYRINGE (ML) INJECTION AS NEEDED
Status: DISCONTINUED | OUTPATIENT
Start: 2025-07-29 | End: 2025-07-29 | Stop reason: SURG

## 2025-07-29 RX ORDER — FENTANYL CITRATE 50 UG/ML
50 INJECTION, SOLUTION INTRAMUSCULAR; INTRAVENOUS ONCE AS NEEDED
Status: COMPLETED | OUTPATIENT
Start: 2025-07-29 | End: 2025-07-29

## 2025-07-29 RX ORDER — CELECOXIB 100 MG/1
200 CAPSULE ORAL ONCE
Status: COMPLETED | OUTPATIENT
Start: 2025-07-29 | End: 2025-07-29

## 2025-07-29 RX ORDER — HYDROMORPHONE HYDROCHLORIDE 1 MG/ML
0.25 INJECTION, SOLUTION INTRAMUSCULAR; INTRAVENOUS; SUBCUTANEOUS
Status: DISCONTINUED | OUTPATIENT
Start: 2025-07-29 | End: 2025-08-01 | Stop reason: HOSPADM

## 2025-07-29 RX ORDER — ENOXAPARIN SODIUM 100 MG/ML
1 INJECTION SUBCUTANEOUS EVERY 12 HOURS SCHEDULED
Status: DISCONTINUED | OUTPATIENT
Start: 2025-07-29 | End: 2025-08-01 | Stop reason: HOSPADM

## 2025-07-29 RX ORDER — BUPIVACAINE HYDROCHLORIDE 2.5 MG/ML
INJECTION, SOLUTION EPIDURAL; INFILTRATION; INTRACAUDAL; PERINEURAL
Status: COMPLETED | OUTPATIENT
Start: 2025-07-29 | End: 2025-07-29

## 2025-07-29 RX ORDER — ATROPINE SULFATE 0.4 MG/ML
0.4 INJECTION, SOLUTION INTRAMUSCULAR; INTRAVENOUS; SUBCUTANEOUS ONCE AS NEEDED
Status: DISCONTINUED | OUTPATIENT
Start: 2025-07-29 | End: 2025-07-29

## 2025-07-29 RX ORDER — PROMETHAZINE HYDROCHLORIDE 25 MG/1
25 SUPPOSITORY RECTAL ONCE AS NEEDED
Status: DISCONTINUED | OUTPATIENT
Start: 2025-07-29 | End: 2025-07-29

## 2025-07-29 RX ORDER — SCOPOLAMINE 1 MG/3D
1 PATCH, EXTENDED RELEASE TRANSDERMAL CONTINUOUS
Status: DISPENSED | OUTPATIENT
Start: 2025-07-29 | End: 2025-08-01

## 2025-07-29 RX ORDER — HYDROCODONE BITARTRATE AND ACETAMINOPHEN 5; 325 MG/1; MG/1
1 TABLET ORAL ONCE AS NEEDED
Status: DISCONTINUED | OUTPATIENT
Start: 2025-07-29 | End: 2025-07-29

## 2025-07-29 RX ORDER — ROCURONIUM BROMIDE 10 MG/ML
INJECTION, SOLUTION INTRAVENOUS AS NEEDED
Status: DISCONTINUED | OUTPATIENT
Start: 2025-07-29 | End: 2025-07-29 | Stop reason: SURG

## 2025-07-29 RX ORDER — ACETAMINOPHEN 500 MG
1000 TABLET ORAL ONCE
Status: COMPLETED | OUTPATIENT
Start: 2025-07-29 | End: 2025-07-29

## 2025-07-29 RX ORDER — INDOCYANINE GREEN AND WATER 25 MG
KIT INJECTION AS NEEDED
Status: DISCONTINUED | OUTPATIENT
Start: 2025-07-29 | End: 2025-07-29 | Stop reason: SURG

## 2025-07-29 RX ORDER — LORAZEPAM 0.5 MG/1
0.5 TABLET ORAL EVERY 8 HOURS PRN
Status: DISCONTINUED | OUTPATIENT
Start: 2025-07-29 | End: 2025-08-01 | Stop reason: HOSPADM

## 2025-07-29 RX ORDER — ONDANSETRON 2 MG/ML
4 INJECTION INTRAMUSCULAR; INTRAVENOUS EVERY 6 HOURS PRN
Status: DISCONTINUED | OUTPATIENT
Start: 2025-07-29 | End: 2025-08-01 | Stop reason: HOSPADM

## 2025-07-29 RX ORDER — EPHEDRINE SULFATE 50 MG/ML
5 INJECTION, SOLUTION INTRAVENOUS ONCE AS NEEDED
Status: DISCONTINUED | OUTPATIENT
Start: 2025-07-29 | End: 2025-07-29

## 2025-07-29 RX ORDER — CELECOXIB 200 MG/1
200 CAPSULE ORAL EVERY 12 HOURS SCHEDULED
Status: COMPLETED | OUTPATIENT
Start: 2025-07-29 | End: 2025-08-01

## 2025-07-29 RX ORDER — ESCITALOPRAM OXALATE 10 MG/1
10 TABLET ORAL NIGHTLY
Status: DISCONTINUED | OUTPATIENT
Start: 2025-07-29 | End: 2025-08-01 | Stop reason: HOSPADM

## 2025-07-29 RX ORDER — LIDOCAINE HYDROCHLORIDE 20 MG/ML
INJECTION, SOLUTION INFILTRATION; PERINEURAL AS NEEDED
Status: DISCONTINUED | OUTPATIENT
Start: 2025-07-29 | End: 2025-07-29 | Stop reason: SURG

## 2025-07-29 RX ADMIN — Medication 20 MG: at 07:48

## 2025-07-29 RX ADMIN — HYDRALAZINE HYDROCHLORIDE 5 MG: 20 INJECTION INTRAMUSCULAR; INTRAVENOUS at 14:49

## 2025-07-29 RX ADMIN — CEFAZOLIN 2 G: 2 INJECTION, POWDER, LYOPHILIZED, FOR SOLUTION INTRAVENOUS at 11:37

## 2025-07-29 RX ADMIN — PROPOFOL 20 MG: 10 INJECTION, EMULSION INTRAVENOUS at 09:44

## 2025-07-29 RX ADMIN — LABETALOL HYDROCHLORIDE 5 MG: 5 INJECTION, SOLUTION INTRAVENOUS at 09:44

## 2025-07-29 RX ADMIN — SCOPOLAMINE 1 PATCH: 1.5 PATCH, EXTENDED RELEASE TRANSDERMAL at 07:07

## 2025-07-29 RX ADMIN — HYDROMORPHONE HYDROCHLORIDE 0.5 MG: 1 INJECTION, SOLUTION INTRAMUSCULAR; INTRAVENOUS; SUBCUTANEOUS at 08:40

## 2025-07-29 RX ADMIN — INDOCYANINE GREEN 7.5 MG: KIT INTRAVENOUS at 10:30

## 2025-07-29 RX ADMIN — PROPOFOL 60 MG: 10 INJECTION, EMULSION INTRAVENOUS at 08:22

## 2025-07-29 RX ADMIN — FENTANYL CITRATE 50 MCG: 50 INJECTION, SOLUTION INTRAMUSCULAR; INTRAVENOUS at 12:04

## 2025-07-29 RX ADMIN — ACETAMINOPHEN 1000 MG: 500 TABLET, FILM COATED ORAL at 18:06

## 2025-07-29 RX ADMIN — BUPIVACAINE HYDROCHLORIDE 30 ML: 2.5 INJECTION, SOLUTION EPIDURAL; INFILTRATION; INTRACAUDAL; PERINEURAL at 07:55

## 2025-07-29 RX ADMIN — GABAPENTIN 600 MG: 300 CAPSULE ORAL at 07:07

## 2025-07-29 RX ADMIN — DEXAMETHASONE SODIUM PHOSPHATE 8 MG: 4 INJECTION, SOLUTION INTRAMUSCULAR; INTRAVENOUS at 11:15

## 2025-07-29 RX ADMIN — ONDANSETRON 4 MG: 2 INJECTION, SOLUTION INTRAMUSCULAR; INTRAVENOUS at 20:47

## 2025-07-29 RX ADMIN — ESCITALOPRAM 10 MG: 10 TABLET, FILM COATED ORAL at 20:48

## 2025-07-29 RX ADMIN — ENOXAPARIN SODIUM 80 MG: 100 INJECTION SUBCUTANEOUS at 20:40

## 2025-07-29 RX ADMIN — ACETAMINOPHEN 1000 MG: 500 TABLET, FILM COATED ORAL at 23:55

## 2025-07-29 RX ADMIN — ACETAMINOPHEN 1000 MG: 500 TABLET, FILM COATED ORAL at 07:07

## 2025-07-29 RX ADMIN — CETIRIZINE HYDROCHLORIDE 10 MG: 10 TABLET, FILM COATED ORAL at 18:06

## 2025-07-29 RX ADMIN — CEFAZOLIN 2 G: 2 INJECTION, POWDER, FOR SOLUTION INTRAMUSCULAR; INTRAVENOUS at 07:36

## 2025-07-29 RX ADMIN — HYDROMORPHONE HYDROCHLORIDE 0.25 MG: 1 INJECTION, SOLUTION INTRAMUSCULAR; INTRAVENOUS; SUBCUTANEOUS at 16:00

## 2025-07-29 RX ADMIN — PROPOFOL 170 MG: 10 INJECTION, EMULSION INTRAVENOUS at 07:48

## 2025-07-29 RX ADMIN — SODIUM CHLORIDE, POTASSIUM CHLORIDE, SODIUM LACTATE AND CALCIUM CHLORIDE 9 ML/HR: 600; 310; 30; 20 INJECTION, SOLUTION INTRAVENOUS at 07:11

## 2025-07-29 RX ADMIN — ROCURONIUM BROMIDE 10 MG: 10 INJECTION, SOLUTION INTRAVENOUS at 09:21

## 2025-07-29 RX ADMIN — HYDROMORPHONE HYDROCHLORIDE 0.5 MG: 1 INJECTION, SOLUTION INTRAMUSCULAR; INTRAVENOUS; SUBCUTANEOUS at 12:45

## 2025-07-29 RX ADMIN — HYDROMORPHONE HYDROCHLORIDE 0.25 MG: 1 INJECTION, SOLUTION INTRAMUSCULAR; INTRAVENOUS; SUBCUTANEOUS at 23:59

## 2025-07-29 RX ADMIN — FENTANYL CITRATE 50 MCG: 50 INJECTION, SOLUTION INTRAMUSCULAR; INTRAVENOUS at 07:47

## 2025-07-29 RX ADMIN — METRONIDAZOLE 500 MG: 500 INJECTION, SOLUTION INTRAVENOUS at 07:32

## 2025-07-29 RX ADMIN — ONDANSETRON 4 MG: 2 INJECTION INTRAMUSCULAR; INTRAVENOUS at 11:24

## 2025-07-29 RX ADMIN — GABAPENTIN 400 MG: 400 CAPSULE ORAL at 22:39

## 2025-07-29 RX ADMIN — CELECOXIB 200 MG: 100 CAPSULE ORAL at 07:07

## 2025-07-29 RX ADMIN — Medication 10 MG: at 09:29

## 2025-07-29 RX ADMIN — HYDROMORPHONE HYDROCHLORIDE 0.25 MG: 1 INJECTION, SOLUTION INTRAMUSCULAR; INTRAVENOUS; SUBCUTANEOUS at 20:40

## 2025-07-29 RX ADMIN — FAMOTIDINE 20 MG: 10 INJECTION INTRAVENOUS at 07:17

## 2025-07-29 RX ADMIN — SUGAMMADEX 400 MG: 100 INJECTION, SOLUTION INTRAVENOUS at 11:32

## 2025-07-29 RX ADMIN — HYDROMORPHONE HYDROCHLORIDE 0.5 MG: 1 INJECTION, SOLUTION INTRAMUSCULAR; INTRAVENOUS; SUBCUTANEOUS at 09:31

## 2025-07-29 RX ADMIN — ACETAMINOPHEN 1000 MG: 10 INJECTION, SOLUTION INTRAVENOUS at 12:17

## 2025-07-29 RX ADMIN — FENTANYL CITRATE 50 MCG: 50 INJECTION, SOLUTION INTRAMUSCULAR; INTRAVENOUS at 08:21

## 2025-07-29 RX ADMIN — PROPOFOL 50 MG: 10 INJECTION, EMULSION INTRAVENOUS at 08:26

## 2025-07-29 RX ADMIN — SODIUM CHLORIDE 50 ML/HR: 9 INJECTION, SOLUTION INTRAVENOUS at 16:00

## 2025-07-29 RX ADMIN — LIDOCAINE HYDROCHLORIDE 50 MG: 20 INJECTION, SOLUTION INFILTRATION; PERINEURAL at 07:48

## 2025-07-29 RX ADMIN — BUPIVACAINE 20 ML: 13.3 INJECTION, SUSPENSION, LIPOSOMAL INFILTRATION at 07:55

## 2025-07-29 RX ADMIN — MIDAZOLAM 1 MG: 1 INJECTION INTRAMUSCULAR; INTRAVENOUS at 07:20

## 2025-07-29 RX ADMIN — CELECOXIB 200 MG: 200 CAPSULE ORAL at 20:48

## 2025-07-29 RX ADMIN — ROCURONIUM BROMIDE 70 MG: 10 INJECTION, SOLUTION INTRAVENOUS at 07:48

## 2025-07-29 RX ADMIN — ALVIMOPAN 12 MG: 12 CAPSULE ORAL at 07:07

## 2025-07-29 RX ADMIN — PHENYLEPHRINE HYDROCHLORIDE 100 MCG: 10 INJECTION INTRAVENOUS at 08:03

## 2025-07-29 RX ADMIN — GABAPENTIN 400 MG: 400 CAPSULE ORAL at 18:06

## 2025-07-29 RX ADMIN — SODIUM CHLORIDE, POTASSIUM CHLORIDE, SODIUM LACTATE AND CALCIUM CHLORIDE 1000 ML: 600; 310; 30; 20 INJECTION, SOLUTION INTRAVENOUS at 07:36

## 2025-07-29 NOTE — ANESTHESIA PROCEDURE NOTES
Peripheral Block      Patient reassessed immediately prior to procedure    Patient location during procedure: pre-op  Reason for block: at surgeon's request and post-op pain management  Performed by  Anesthesiologist: Narayan Aguero MD  Preanesthetic Checklist  Completed: patient identified, IV checked, site marked, risks and benefits discussed, surgical consent, monitors and equipment checked, pre-op evaluation and timeout performed  Prep:  Pt Position: supine  Sterile barriers:gloves, mask, washed/disinfected hands and cap  Prep: ChloraPrep  Patient monitoring: blood pressure monitoring, continuous pulse oximetry and EKG  Procedure    Sedation: yes  Performed under: local infiltration  Guidance:ultrasound guided    ULTRASOUND INTERPRETATION.  Using ultrasound guidance a 21 G gauge needle was placed in close proximity to the nerve, at which point, under ultrasound guidance anesthetic was injected in the area of the nerve and spread of the anesthesia was seen on ultrasound in close proximity thereto.  There were no abnormalities seen on ultrasound; a digital image was taken; and the patient tolerated the procedure with no complications. Images:still images obtained, printed/placed on chart    Laterality:Bilateral  Block Type:TAP  Injection Technique:single-shotNeedle Gauge:21 G  Resistance on Injection: less than 15 psi    Medications Used: bupivacaine PF (MARCAINE) 0.25 % injection - Injection   30 mL - 7/29/2025 7:55:00 AM  bupivacaine liposome (EXPAREL) 1.3 % injection - Infiltration   20 mL - 7/29/2025 7:55:00 AM      Medications  Comment:US was used for anatomical localization, directing the needle and visualization of LA spread.    15 ml of 0.25% bupivacaine and 10 ml of Exparel were used on each side.     Post Assessment  Injection Assessment: negative aspiration for heme, no paresthesia on injection and incremental injection  Patient Tolerance:comfortable throughout block  Complications:no  Performed  by: Narayan Aguero MD

## 2025-07-29 NOTE — PLAN OF CARE
"Goal Outcome Evaluation:  Plan of Care Reviewed With: patient        Progress: no change  Outcome Evaluation: Admit from PACU, s/p colon resection with end colostomy. Patient c/o abd pain in \"waves\", ERAS protocol and PRN diludid given x1. Braswell in place, to be removed in the morning. Started on clear liquid diet. Small amount of stool in colostomy. Family at bedside.                             "

## 2025-07-29 NOTE — OUTREACH NOTE
Medical Week 2 Survey      Flowsheet Row Responses   [facility] patient discharged from? [place]   Does the patient have one of the following disease processes/diagnoses(primary or secondary)? Other   Week 2 attempt successful? No   Unsuccessful attempts Attempt 1   Revoke Readmitted            NAYELI CORREA - Registered Nurse

## 2025-07-29 NOTE — BRIEF OP NOTE
COLON RESECTION LOW ANTERIOR LAPAROSCOPIC WITH DAVINCI ROBOT  Progress Note    Carole Weaver  7/29/2025    Pre-op Diagnosis:   History of rectal cancer [Z85.048]  Full incontinence of feces [R15.9]       Post-Op Diagnosis Codes:     * History of rectal cancer [Z85.048]     * Full incontinence of feces [R15.9]    Procedure(s):      Procedure(s):  COLON RESECTION LOW ANTERIOR LAPAROSCOPIC WITH DAVINCI ROBOT, LYSIS OF ADHESIONS, END COLOSTOMY FORMATION      Surgeon(s):  Padmaja Ye MD    Anesthesia: General with Block    Staff:   Circulator: Katiuska Colón, RN; Lala Etienne RN  Scrub Person: Hortensia Gallagher; Lakesha Jameson RN  Assistant: Charissa Messina PA-C  Assistant: Charissa Messina PA-C    Estimated Blood Loss: 100 mL    Urine Voided: 220 mL    Specimens:                Specimens       ID Source Type Tests Collected By Collected At Frozen?    A Large Intestine, Left / Descending Colon Tissue TISSUE PATHOLOGY EXAM   Padmaja Ye MD 7/29/25 1102 No    Description: DESCENDING & SIGMOID COLON    Comment: DESCENDING & SIGMOID COLON              Drains:   Urethral Catheter Silicone 16 Fr. (Active)   Daily Indications Selected surgeries ( tract, abdomen) 07/29/25 1145   Site Assessment Clean;Skin intact 07/29/25 1145   Collection Container Standard drainage bag 07/29/25 1145   Securement Method Securing device 07/29/25 1145       [REMOVED] NG/OG Tube Nasogastric 16 Fr Right nostril (Removed)       Findings:       Complications:     Assistant: Charissa Messina PA-C  was responsible for performing the following activities: Retraction, Suction, Irrigation, Suturing, Closing, and Placing Dressing and their skilled assistance was necessary for the success of this case.    Padmaja Ye MD     Date: 7/29/2025  Time: 12:13 EDT

## 2025-07-29 NOTE — ANESTHESIA PREPROCEDURE EVALUATION
Anesthesia Evaluation     Patient summary reviewed   no history of anesthetic complications:   NPO Solid Status: > 8 hours  NPO Liquid Status: > 2 hours           Airway   Mallampati: II  Neck ROM: full  no difficulty expected  Dental - normal exam     Pulmonary - normal exam   (+) pulmonary embolism, a smoker Current, lung cancer,    PE comment: nonlabored  Cardiovascular - normal exam  Exercise tolerance: good (4-7 METS)    Rhythm: regular  Rate: normal    (+) hypertension, valvular problems/murmurs murmur, dysrhythmias Tachycardia, hyperlipidemia  (-) CAD      Neuro/Psych  (+) psychiatric history Depression and Anxiety  GI/Hepatic/Renal/Endo    (+) GERD, GI bleeding resolved, renal disease- stonesDiabetes: resolved post-partum.    Musculoskeletal (-) negative ROS    Abdominal    Substance History      OB/GYN          Other      history of cancer (rectal cancer; secondary lung cancer) active  Blood dyscrasia: Heterozygous factor V Leiden mutation.                    Anesthesia Plan    ASA 3     general with block and ERAS Protocol     intravenous induction     Anesthetic plan, risks, benefits, and alternatives have been provided, discussed and informed consent has been obtained with: patient.      CODE STATUS:

## 2025-07-29 NOTE — ANESTHESIA POSTPROCEDURE EVALUATION
Patient: Carole Weaver    Procedure Summary       Date: 07/29/25 Room / Location: [facility] OR [address] [place] [address] MAIN OR    Anesthesia Start: 0741 Anesthesia Stop: 1144    Procedure: COLON RESECTION LOW ANTERIOR LAPAROSCOPIC WITH DAVINCI ROBOT, LYSIS OF ADHESIONS, END COLOSTOMY FORMATION (Abdomen) Diagnosis:       History of rectal cancer      Full incontinence of feces      (History of rectal cancer [Z85.048])      (Full incontinence of feces [R15.9])    Surgeons: Padmaja Ye MD Provider: Narayan Aguero MD    Anesthesia Type: general with block, ERAS Protocol ASA Status: 3            Anesthesia Type: general with block, ERAS Protocol    Vitals  Vitals Value Taken Time   /80 07/29/25 15:30   Temp 36.5 °C (97.7 °F) 07/29/25 11:45   Pulse 76 07/29/25 15:32   Resp 20 07/29/25 14:15   SpO2 99 % 07/29/25 15:31   Vitals shown include unfiled device data.        Post Anesthesia Care and Evaluation    Patient location during evaluation: PACU  Patient participation: complete - patient participated  Level of consciousness: awake and alert  Pain management: adequate    Airway patency: patent  Anesthetic complications: No anesthetic complications  PONV Status: none  Cardiovascular status: acceptable and hemodynamically stable  Respiratory status: acceptable, nonlabored ventilation and spontaneous ventilation  Hydration status: acceptable

## 2025-07-29 NOTE — ANESTHESIA PROCEDURE NOTES
Airway  Reason: elective    Date/Time: 7/29/2025 7:51 AM  Airway not difficult    General Information and Staff    Patient location during procedure: OR  CRNA/CAA: Alma Beyer CRNA    Indications and Patient Condition  Indications for airway management: airway protection    Preoxygenated: yes    Mask difficulty assessment: 2 - vent by mask + OA or adjuvant +/- NMBA    Final Airway Details    Final airway type: endotracheal airway      Successful airway: ETT  Cuffed: yes   Successful intubation technique: direct laryngoscopy  Adjuncts used in placement: intubating stylet  Endotracheal tube insertion site: oral  Blade: Luis Antonio  Blade size: 4  ETT size (mm): 7.0  Cormack-Lehane Classification: grade I - full view of glottis  Placement verified by: chest auscultation and capnometry   Cuff volume (mL): 6  Measured from: teeth  ETT/EBT  to teeth (cm): 22  Number of attempts at approach: 1  Assessment: lips, teeth, and gum same as pre-op and atraumatic intubation

## 2025-07-29 NOTE — OP NOTE
Surgeon: Padmaja Ye MD    Surgical  Assistant: Charissa Messina PA-C     Preoperative diagnosis: History of rectal cancer [Z85.048]  Full incontinence of feces [R15.9]    Post-Op Diagnosis Codes:     * History of rectal cancer [Z85.048]     * Full incontinence of feces [R15.9]    Procedure: COLON RESECTION LOW ANTERIOR LAPAROSCOPIC WITH DAVINCI ROBOT, LYSIS OF ADHESIONS, END COLOSTOMY FORMATION, * Panel 2 does not exist *    Estimated Blood Loss: 100 mL    Specimens:   Specimens       ID Source Type Tests Collected By Collected At Frozen?    A Large Intestine, Left / Descending Colon Tissue TISSUE PATHOLOGY EXAM   Padmaja Ye MD 7/29/25 1102 No    Description: DESCENDING & SIGMOID COLON    Comment: DESCENDING & SIGMOID COLON           Order Name Source Comment Collection Info Order Time   TISSUE PATHOLOGY EXAM Large Intestine, Left / Descending Colon DESCENDING & SIGMOID COLON Collected By: Padmaja Ye MD 7/29/2025 11:03 AM     Release to patient   Routine Release            Indication:  Carole Weaver is a 44 y.o. female who comes in with History of rectal cancer    Full incontinence of feces  Patient understands risks, benefits,and alternatives wishes to proceed.      Procedure Details:    Patient was brought to the operating room, SCD's in place, antibiotics infused. After general anesthesia was achieved the patient had a TAP block done by anesthesia.  The patient was then placed in Yellofins lithotomy and prepped and draped in sterile fashion. Marcaine 0.25% with epinephrine was used as a local infiltration at the trocar sites. First incision was made in the left upper quadrant midclavicular line right below the costal margin and Veress needle was used to gain access into the abdomen and insufflated up to 15 mmHg. A 8 mm Visiport trocar with a 0 degree 5 mm laparoscope was used to enter the abdomen under direct visualization.  Patient had omentum and some small bowel adherent to the lower  abdominal wall.  With the patient's history of cancer I inspected the abdomen and there did not appear to be any peritoneal disease. I then went ahead and placed 3 other trocars in a diagonal down to a few centimeters away from the right ASIS.  I then placed an assistant port in the right midquadrant.  I put the patient in Trendelenburg 6 degrees tilted to the right and docked the robot. I placed all instruments under direct visualization and then I went to the console.  First I started taking down the adhesions of the small bowel off the abdominal wall, the left colon and into the pelvis.  This took slow and tedious work.  There was 1 serosal tear that was made in a small bowel loop that was adherent to the pelvis below the sacral promontory.  I repaired it in 2 layers with 2-0 Vicryl.  I then was able to lift the left colon which had already been dissected off the retroperitoneum because of her previous operation.  I identified the left ureter.  I was able to dissect the colon down past the sacral promontory into the low pelvis.  I used both scissors and vessel sealer.  Once I got down as far as I could reach safely I skeletonized the mesentery of the colon.  I used 3 loads of the 45 mm green stapler to go across the rectum.  There was good mobility of the colon.  I used a Alexandru needle to go across the end of the staple line of the colon and then up through the abdominal wall near where the ostomy site was going to be to easily bring out the colon.  We then removed all of the instruments under direct visualization.  I had the robot was undocked and I scrubbed back in.  In the left upper quadrant I made elliptical incision through the skin.  This is to be her colostomy site.  I used electrocautery to get down to the anterior fascia.  I incised the anterior fascia.  Then using curved Naqvi's to split the rectus muscle.  Then incised the posterior fascia.  I used an extra small Scot wound retractor to protect the  abdominal wall.  I brought the colon up through the abdominal wall.  Patient had a significant amount of stool in the colon.  This was all milked down to the end of the colon that was going to be resected.  I used a 75 MARIALUISA stapler to go across the mid descending colon.  The colon was sent off a specimen.  The wound retractor was removed.  I then closed the 12 mm  trocar fascia site with 2-0 Vicryl.  The subcutaneous tissue was irrigated out with saline.  All skin was closed with 4-0 Monocryl.  I then Brooked the colostomy with 3-0 Vicryl.  All instrument, lap counts and needle counts were correct. The patient was stable throughout the entire case and was taken to recovery.    Assistant: Charissa Messina PA-C was responsible for performing the following activities: Retraction, Suction, Irrigation, Suturing, Closing and Placing Dressing and their skilled assistance was necessary for the success of this case

## 2025-07-29 NOTE — DISCHARGE PLACEMENT REQUEST
"Carole Weaver (44 y.o. Female)       Date of Birth   1980    Social Security Number       [address]   [address] [place] [address]    Home Phone   [phone]    MRN   [id number]       [employer]   [employer]ologist    Marital Status                               Admission Date   7/29/2025    Admission Type   Elective    Admitting Provider   Padmaja Ye MD    Attending Provider   Padmaja Ye MD    Department, Room/Bed   [address], E452/1       Discharge Date       Discharge Disposition       Discharge Destination                                 Attending Provider: Padmaja Ye MD    Allergies: No Known Allergies    Isolation: None   Infection: Other (07/21/25)   Code Status: CPR    Ht: 167.6 cm (66\")   Wt: 83.3 kg (183 lb 10.3 oz)    Admission Cmt: None   Principal Problem: Full incontinence of feces [R15.9]                   Active Insurance as of 7/29/2025       Primary Coverage       Payor Plan Insurance Group Employer/Plan Group    ANTHEM BLUE CROSS ANTHEM BLUE CROSS BLUE SHIELD PPO [id number]       Payor Plan Address Payor Plan Phone Number Payor Plan Fax Number Effective Dates    [address] [phone]  1/1/2018 - None Entered    [facility] [address]         Subscriber Name Subscriber Birth Date Member ID       JUSTUS WEAVER 1980 [id number]                     Emergency Contacts        (Rel.) Home Phone Work Phone Mobile Phone    Justus Weaver (Spouse) -- -- [phone]    DENIS CRONIN (Mother) [phone] -- [phone]    Matt Weaver (Son) -- -- [phone]    Patrick Cronin (Father) [phone] -- [phone]              "

## 2025-07-30 LAB
ANION GAP SERPL CALCULATED.3IONS-SCNC: 9 MMOL/L (ref 5–15)
BASOPHILS # BLD AUTO: 0.01 10*3/MM3 (ref 0–0.2)
BASOPHILS NFR BLD AUTO: 0.1 % (ref 0–1.5)
BUN SERPL-MCNC: 7 MG/DL (ref 6–20)
BUN/CREAT SERPL: 15.6 (ref 7–25)
CALCIUM SPEC-SCNC: 8.5 MG/DL (ref 8.6–10.5)
CHLORIDE SERPL-SCNC: 108 MMOL/L (ref 98–107)
CO2 SERPL-SCNC: 25 MMOL/L (ref 22–29)
CREAT SERPL-MCNC: 0.45 MG/DL (ref 0.57–1)
DEPRECATED RDW RBC AUTO: 44.7 FL (ref 37–54)
EGFRCR SERPLBLD CKD-EPI 2021: 121.1 ML/MIN/1.73
EOSINOPHIL # BLD AUTO: 0.01 10*3/MM3 (ref 0–0.4)
EOSINOPHIL NFR BLD AUTO: 0.1 % (ref 0.3–6.2)
ERYTHROCYTE [DISTWIDTH] IN BLOOD BY AUTOMATED COUNT: 13 % (ref 12.3–15.4)
GLUCOSE SERPL-MCNC: 97 MG/DL (ref 65–99)
HCT VFR BLD AUTO: 37 % (ref 34–46.6)
HGB BLD-MCNC: 12.3 G/DL (ref 12–15.9)
IMM GRANULOCYTES # BLD AUTO: 0.02 10*3/MM3 (ref 0–0.05)
IMM GRANULOCYTES NFR BLD AUTO: 0.2 % (ref 0–0.5)
LYMPHOCYTES # BLD AUTO: 1.31 10*3/MM3 (ref 0.7–3.1)
LYMPHOCYTES NFR BLD AUTO: 14.9 % (ref 19.6–45.3)
MAGNESIUM SERPL-MCNC: 2.1 MG/DL (ref 1.6–2.6)
MCH RBC QN AUTO: 31.6 PG (ref 26.6–33)
MCHC RBC AUTO-ENTMCNC: 33.2 G/DL (ref 31.5–35.7)
MCV RBC AUTO: 95.1 FL (ref 79–97)
MONOCYTES # BLD AUTO: 0.83 10*3/MM3 (ref 0.1–0.9)
MONOCYTES NFR BLD AUTO: 9.4 % (ref 5–12)
NEUTROPHILS NFR BLD AUTO: 6.63 10*3/MM3 (ref 1.7–7)
NEUTROPHILS NFR BLD AUTO: 75.3 % (ref 42.7–76)
NRBC BLD AUTO-RTO: 0 /100 WBC (ref 0–0.2)
PLATELET # BLD AUTO: 150 10*3/MM3 (ref 140–450)
PMV BLD AUTO: 10.1 FL (ref 6–12)
POTASSIUM SERPL-SCNC: 3.4 MMOL/L (ref 3.5–5.2)
RBC # BLD AUTO: 3.89 10*6/MM3 (ref 3.77–5.28)
SODIUM SERPL-SCNC: 142 MMOL/L (ref 136–145)
WBC NRBC COR # BLD AUTO: 8.81 10*3/MM3 (ref 3.4–10.8)

## 2025-07-30 PROCEDURE — 99024 POSTOP FOLLOW-UP VISIT: CPT | Performed by: PHYSICIAN ASSISTANT

## 2025-07-30 PROCEDURE — 25010000002 ENOXAPARIN PER 10 MG: Performed by: COLON & RECTAL SURGERY

## 2025-07-30 PROCEDURE — 83735 ASSAY OF MAGNESIUM: CPT | Performed by: COLON & RECTAL SURGERY

## 2025-07-30 PROCEDURE — 25010000002 METHOCARBAMOL 1000 MG/10ML SOLUTION: Performed by: COLON & RECTAL SURGERY

## 2025-07-30 PROCEDURE — 80048 BASIC METABOLIC PNL TOTAL CA: CPT | Performed by: COLON & RECTAL SURGERY

## 2025-07-30 PROCEDURE — 25010000002 HYDROMORPHONE PER 4 MG: Performed by: COLON & RECTAL SURGERY

## 2025-07-30 PROCEDURE — 85025 COMPLETE CBC W/AUTO DIFF WBC: CPT | Performed by: COLON & RECTAL SURGERY

## 2025-07-30 RX ORDER — FAMOTIDINE 20 MG/1
20 TABLET, FILM COATED ORAL
Status: DISCONTINUED | OUTPATIENT
Start: 2025-07-30 | End: 2025-08-01 | Stop reason: HOSPADM

## 2025-07-30 RX ORDER — METHOCARBAMOL 100 MG/ML
500 INJECTION, SOLUTION INTRAMUSCULAR; INTRAVENOUS EVERY 8 HOURS PRN
Status: DISCONTINUED | OUTPATIENT
Start: 2025-07-30 | End: 2025-08-01 | Stop reason: HOSPADM

## 2025-07-30 RX ADMIN — CELECOXIB 200 MG: 200 CAPSULE ORAL at 20:39

## 2025-07-30 RX ADMIN — GABAPENTIN 400 MG: 400 CAPSULE ORAL at 21:49

## 2025-07-30 RX ADMIN — METOPROLOL SUCCINATE 12.5 MG: 25 TABLET, EXTENDED RELEASE ORAL at 20:37

## 2025-07-30 RX ADMIN — ESCITALOPRAM 10 MG: 10 TABLET, FILM COATED ORAL at 20:39

## 2025-07-30 RX ADMIN — ENOXAPARIN SODIUM 80 MG: 100 INJECTION SUBCUTANEOUS at 08:30

## 2025-07-30 RX ADMIN — ACETAMINOPHEN 1000 MG: 500 TABLET, FILM COATED ORAL at 06:25

## 2025-07-30 RX ADMIN — FAMOTIDINE 20 MG: 20 TABLET, FILM COATED ORAL at 17:58

## 2025-07-30 RX ADMIN — HYDROMORPHONE HYDROCHLORIDE 0.25 MG: 1 INJECTION, SOLUTION INTRAMUSCULAR; INTRAVENOUS; SUBCUTANEOUS at 18:07

## 2025-07-30 RX ADMIN — ACETAMINOPHEN 1000 MG: 500 TABLET, FILM COATED ORAL at 17:58

## 2025-07-30 RX ADMIN — GABAPENTIN 400 MG: 400 CAPSULE ORAL at 14:46

## 2025-07-30 RX ADMIN — GABAPENTIN 400 MG: 400 CAPSULE ORAL at 06:25

## 2025-07-30 RX ADMIN — HYDROMORPHONE HYDROCHLORIDE 0.25 MG: 1 INJECTION, SOLUTION INTRAMUSCULAR; INTRAVENOUS; SUBCUTANEOUS at 12:21

## 2025-07-30 RX ADMIN — ENOXAPARIN SODIUM 80 MG: 100 INJECTION SUBCUTANEOUS at 20:43

## 2025-07-30 RX ADMIN — ALVIMOPAN 12 MG: 12 CAPSULE ORAL at 08:30

## 2025-07-30 RX ADMIN — HYDROMORPHONE HYDROCHLORIDE 0.25 MG: 1 INJECTION, SOLUTION INTRAMUSCULAR; INTRAVENOUS; SUBCUTANEOUS at 21:49

## 2025-07-30 RX ADMIN — ALVIMOPAN 12 MG: 12 CAPSULE ORAL at 20:39

## 2025-07-30 RX ADMIN — METHOCARBAMOL 500 MG: 1000 INJECTION, SOLUTION INTRAMUSCULAR; INTRAVENOUS at 15:40

## 2025-07-30 RX ADMIN — HYDROMORPHONE HYDROCHLORIDE 0.25 MG: 1 INJECTION, SOLUTION INTRAMUSCULAR; INTRAVENOUS; SUBCUTANEOUS at 06:46

## 2025-07-30 RX ADMIN — ACETAMINOPHEN 1000 MG: 500 TABLET, FILM COATED ORAL at 12:21

## 2025-07-30 RX ADMIN — CETIRIZINE HYDROCHLORIDE 10 MG: 10 TABLET, FILM COATED ORAL at 08:30

## 2025-07-30 RX ADMIN — CELECOXIB 200 MG: 200 CAPSULE ORAL at 08:30

## 2025-07-30 NOTE — CASE MANAGEMENT/SOCIAL WORK
Continued Stay Note  [facility]     Patient Name: Carole Weaver  MRN: [id number]  Today's Date: 7/30/2025    Admit Date: 7/29/2025    Plan: Home with family. Denies need for HH. Family transport   Discharge Plan       Row Name 07/30/25 1645       Plan    Plan Home with family. Denies need for HH. Family transport    Patient/Family in Agreement with Plan yes    Plan Comments Met with pt at bedside. Pt lives with her spouse and son in a single-story house with a basement. There are 3 steps to enter home. Uses no home DME. Pt is independent with ADLs at baseline. Pt has never been to Rehab. She has used [facility]. Pt denies need for HH at D/C. At discharge, family will transport.  Lebron Muniz RN-BC                   Discharge Codes    No documentation.                 Expected Discharge Date and Time       Expected Discharge Date Expected Discharge Time    Aug 1, 2025               Lebron Muniz RN

## 2025-07-30 NOTE — PLAN OF CARE
Problem: Adult Inpatient Plan of Care  Goal: Plan of Care Review  Outcome: Progressing   Goal Outcome Evaluation:   Vss.Ostomy leaking today x's 2, bag changed per pt, and entire ostomy changed per wound/ostomy RN.JEN Morris aware k+ 3.4, states will recheck in am.Diet advanced for dinner per MD order.Prn dilaudid given as ordered for pain, prn robaxin given as ordered for spasms.No c/o n/v.Ambulated in diggs throughout the day.

## 2025-07-30 NOTE — PAYOR COMM NOTE
"Carole Weaver (44 y.o. Female)      NOTICE OF ADMISSION:  ref# [id number]     [phone]     PH: [phone]    [facility]:  [facility] [id number]  [facility]- [id number]    MARCIO VELASCO RN,[facility]     Date of Birth   1980    Social Security Number       Address   [address] [place], [place] [address]    Home Phone   [phone]    MRN   [id number]       Voodoo   Religious    Marital Status                               Admission Date   7/29/2025    Admission Type   Elective    Admitting Provider   Padmaja Ye MD    Attending Provider   Padmaja Ye MD    Department, Room/Bed   [address], E452/1       Discharge Date       Discharge Disposition       Discharge Destination                                 Attending Provider: Padmaja Ye MD    Allergies: No Known Allergies    Isolation: None   Infection: Other (07/21/25)   Code Status: CPR    Ht: 167.6 cm (66\")   Wt: 83.3 kg (183 lb 10.3 oz)    Admission Cmt: None   Principal Problem: Full incontinence of feces [R15.9]                   Active Insurance as of 7/29/2025       Primary Coverage       Payor Plan Insurance Group Employer/Plan Group    [employer]HEM BLUE CROSS ANTHEM BLUE CROSS BLUE SHIELD PPO [id number]       Payor Plan Address Payor Plan Phone Number Payor Plan Fax Number Effective Dates    [address] [phone]  1/1/2018 - None Entered    [place] [address]         Subscriber Name Subscriber Birth Date Member ID       JUSTUS WEAVER 1980 [id number]                     Emergency Contacts        (Rel.) Home Phone Work Phone Mobile Phone    Justus Weaver (Spouse) -- -- [phone]    ROSEANNEDENIS (Mother) [phone] -- [phone]    Matt Weaver (Son) -- -- [phone]    RoseannePatrick (Father) [phone] -- [phone]                 History & Physical        Padmaja Ye MD at 07/29/25 0712          H&P reviewed.  The patient was examined and there " are no changes to the H&P  Electronically signed by Padmaja Ye MD at 07/29/25 9168   Source Note: H&P (View-Only)          Carole Wevaer is a 44 y.o. female in for follow up of History of rectal cancer    Full incontinence of feces    History of Present Illness  The patient is a 44-year-old female with a history of rectal cancer, currently undergoing chemotherapy for metastatic disease.    She has been experiencing frequent bowel movements, necessitating the use of Pepto-Bismol every 15 minutes. This over-the-counter medication has provided some relief. However, she has had to resort to sleeping in the bathtub for the past two nights to achieve even two hours of uninterrupted sleep. She describes an internal burning sensation that intensifies and spreads, causing her to rush to the bathroom. She has taken photographs of her stool, which she describes as a small, mushy blob that brings her to tears. She occasionally notices blood in her stool. She has lost weight and developed a fistula, which was treated with a wound VAC and home health care. She does not experience stomach aches. She is considering a colostomy and is curious about the recovery time post-surgery. She is also interested in learning about other patients' experiences with the operation. She is currently applying hydrocortisone cream 5% and magic butt paste to her rectal area. She has been taking two doses of Pepto-Bismol every hour until her symptoms subside, totaling five doses last night.    MEDICATIONS  Current: octreotide, Imodium, Lomotil, Pepto-Bismol, hydrocortisone cream, magic butt paste     Past Medical History:   Diagnosis Date    Abdominopelvic abscess 08/12/2020    ADMITTED TO [facility]    Abnormal Pap smear of cervix 02/02/1998    JULIUS I    Abscess of abdominal wall 11/28/2012    SEEN AT [facility] ER    Anemia in pregnancy     Anxiety     Bilateral epiphora 04/2020    Bilateral hand swelling 05/02/2020    SEEN AT [facility] ER    Cervical lymphadenitis  06/06/2012    SEEN AT [facility] ER    Chemotherapy induced diarrhea 01/2021    Chemotherapy induced neutropenia 04/2020    Chemotherapy-induced nausea 02/2020    Chemotherapy-induced thrombocytopenia 05/2020    Chronic anticoagulation     Chronic diarrhea     Colon polyps     FOLLOWED BY DR. 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 ALEXANDRU HUNT    History of gestational diabetes     History of pre-eclampsia 1998    History of pre-eclampsia     History of radiation therapy 2019    FOLLOWED BY DR. JAVON LEWIS    History of TB skin testing 01/17/2009    TB Skin Test    History of TB skin testing 01/17/2009    TB Skin Test    History of transfusion 2019    12/2019    HPV in female 1998    Hypokalemia 09/2019    Hypomagnesemia 09/2019    Hyponatremia 06/2021    IBS (irritable bowel syndrome)     Ileostomy present 04/25/2020    Take down on 11/3/2022    Infected insect bite of neck 05/27/2016    SEEN AT [facility]    Kidney stone on right side 10/07/2019    Kidney stones 08/09/2007    SEEN AT [facility] ER    Low anterior resection syndrome 12/2022    FOLLOWED BY DR. 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     Perirectal abscess 2020    Perirectal abscess 2020    Added automatically from request for surgery [id number]      Pulmonary embolism without acute cor pulmonale 09/20/2019    X 3; ADMITTED TO [facility]    Pulmonary nodule, right 2020    Rectal cancer 2019    STAGE IIA, INVASIVE MODERATELY DIFFERENTIATED ADENOCARCINOMA, CHEMO AND XRT FINISHED 2019    Right shoulder pain 2020    SEEN AT [facility] ER    Right ureteral stone 10/01/2019    SEEN AT [facility] ER    Right ureteral stone 10/01/2019    SEEN AT [facility] ER      SAB (spontaneous ) 1996     A2-1 INDUCED    Sciatica     Sepsis due to cellulitis 2002    LEFT GREAT TOE, ADMITTED TO [facility]    Tachycardia 2020    Thrombosis of superior vena cava 2020    AND BRACHIOCEPHALIC VEIN, ADMITTED TO [facility]    Urinary urgency 2020     Past Surgical History:   Procedure Laterality Date    ABDOMINAL SURGERY      CHOLECYSTECTOMY N/A 10/10/2003    LAPAROSCOPIC WITH CHOLANGIOGRAM, DR. JAMEY TALAVERA AT [facility]    COLON RESECTION N/A 2019    Procedure: laparoscopic low anterior colon resection with mobilization of splenic flexure and diverting loop ileostomy: ERAS;  Surgeon: Padmaja Esparza MD;  Location: [facility] OR;  Service: General    COLON RESECTION N/A 2020    Procedure: LOW ANTERIOR COLON RESECTION, COLOANAL ANASTOMOSIS, MOBILIZATION SPLENIC FLEXURE;  Surgeon: Padmaja Esparza MD;  Location: [facility] MAIN OR;  Service: General;  Laterality: N/A;    COLONOSCOPY N/A 07/15/2020    PATENT ANASTAMOSIS IN MID RECTUM, RESCOPE IN 1 YR, DR. PADMAJA ESPARZA AT [facility]    COLONOSCOPY N/A 2022    DIFFUSE AREA OF MODERATELY FRIABLE MUCOSA IN ENTIRE COLON , DIVERSION COLITIS, PATENT AND HEALTHY ANASTAMOSIS, DR. PADMAJA ESPARZA AT [facility]    COLONOSCOPY N/A 2023    6 MM SESSILE SERRATED ADENOMA POLYP IN DESCENDING, PATENT ANASTAMOSIS IN DISTAL RECTUM, RESCOPE IN 2 YRS, DR. PADMAJA ESPARZA AT [facility]    COLONOSCOPY N/A 2025    PATENT AND HEALTHY  ANASTAMOSIS IN DISTAL RECTUM, RESCOPE IN 2 YRS, DR. PADMAJA ESPARZA AT [facility]    COLONOSCOPY W/ POLYPECTOMY N/A 07/08/2019    15 MM TUBULOVILLOUS ADENOMA POLYP IN HEPATIC FLEXURE, 20 MMTUBULOVILLOUS ADENOMA WITH HIGH GRADE DYSPLASIA IN RECTOSIGMOID, 4 CM MASS IN MID RECTUM, PATH: INVASIVE MODERATELY DIFFERENTIATED ADENOCARCINOMA, DR. JENNIFER LI AT [facility]    DILATATION AND EVACUATION N/A 2009    ENDOSCOPY N/A 07/08/2019    LA GRADE A ESOPHAGITIS, GASTRITIS, ALL BIOPSIES BENIGN, DR. JENNIFER LI AT [facility]    ILEOSTOMY CLOSURE N/A 11/14/2022    Procedure: ILEOSTOMY TAKEDOWN;  Surgeon: Padamja Esparza MD;  Location: [facility] OR;  Service: General;  Laterality: N/A;    INCISION AND DRAINAGE PERIRECTAL ABSCESS N/A 08/14/2020    Procedure: INCISION AND DRAINAGE OF retrorectal dehiscence abcess with drain placement and irrigation;  Surgeon: Padmaja Esparza MD;  Location: [facility] OR;  Service: General;  Laterality: N/A;    PAP SMEAR N/A 01/24/2014    SIGMOIDOSCOPY N/A 07/24/2019    INFILTRATIVE PARTIALLY OBSTRUCTING LARGE RECTAL CANCER, AREA TATOOED, DR. PADMAJA ESPARZA AT [facility]    SIGMOIDOSCOPY N/A 11/23/2019    AN ULCERATED PARTIALLY OBSTRUCTING MASS IN MID RECTUM, AREA TATTOOED, DR. PADMAJA ESPARZA AT [facility]    SIGMOIDOSCOPY N/A 07/22/2021    PATENT ANASTAMOSIS IN DISTAL RECTUM, RESCOPE IN 1 YR, DR. PADMAJA ESPARZA AT [facility]    SIGMOIDOSCOPY N/A 09/11/2024    PATCHY INFLAMMATION AND EDEMA IN DESCENDING, AREA BIOPSIED AND WAS BENIGN, PATENT AND HEALTHY ANASTAMOSIS, HEMORRHOIDS,RESCOPE(CY) 12/2025, DR. PADMAJA ESPARZA AT [facility]    TRANSRECTAL ULTRASOUND N/A 07/24/2019    Procedure: ULTRASOUND TRANSRECTAL;  Surgeon: Padmaja Esparza MD;  Location: [facility] ENDOSCOPY;  Service: General    URETEROSCOPY LASER LITHOTRIPSY WITH STENT INSERTION Right 10/30/2019    DR. ESTUARDO BEASLEY AT [place]    VAGINAL DELIVERY N/A 09/18/1998    VENOUS ACCESS DEVICE (PORT) INSERTION Left 08/09/2019    Procedure: INSERTION VENOUS  "ACCESS DEVICE;  Surgeon: Sj Joseph MD;  Location: [facility] OR [facility];  Service: General    VENOUS ACCESS DEVICE (PORT) INSERTION N/A 12/18/2020    Procedure: INSERTION VENOUS ACCESS DEVICE right side, removal venous access device left side;  Surgeon: Sj Joseph MD;  [facility];  Service: General;  Laterality: N/A;    WISDOM TOOTH EXTRACTION Bilateral 1993       /70 (BP Location: Left arm, Patient Position: Sitting, Cuff Size: Adult)   Pulse 89   Ht 167.6 cm (66\")   Wt 79.8 kg (176 lb)   LMP  (LMP Unknown)   SpO2 98%   Breastfeeding No   BMI 28.41 kg/m²   Body mass index is 28.41 kg/m².      PE:   Physical Exam    Physical Exam  Constitutional:       General: She is not in acute distress.     Appearance: She is well-developed.   HENT:      Head: Normocephalic and atraumatic.   Abdominal:      General: There is no distension.      Palpations: Abdomen is soft.      Comments: Midline incision scar.   Rlq incision scar  No hernia  Panis of skin   Musculoskeletal:         General: Normal range of motion.   Neurological:      Mental Status: She is alert.   Psychiatric:         Thought Content: Thought content normal.             Assessment & Plan  1. Rectal cancer with metastatic disease.      2. Full incontinence of feces       She has been experiencing significant discomfort and frequent bowel movements, which have been partially managed with over-the-counter Pepto-Bismol. The CAT scan indicates overflow diarrhea due to a full colon and some stenosis from radiation. A diverting colostomy on the left side is recommended to improve her quality of life. The procedure will involve cutting the colon and bringing it up to create an ostomy, which may be done laparoscopically or through an upper midline incision. The potential risks, benefits, and alternatives were discussed. The surgery is scheduled for 07/29/2025. A prescription for baclofen suppository will be provided to help relax the " pelvic muscles and alleviate the burning sensation. She is advised to continue using Pepto-Bismol as needed and to apply Vaseline internally for coating.         Patient or patient representative verbalized consent for the use of Ambient Listening during the visit with  Padmaja Ye MD for chart documentation. 7/21/2025  08:08 EDT       Electronically signed by Padmaja Ye MD at 07/21/25 0808                 Padmaja Ye MD at 07/18/25 1015          Carole Waever is a 44 y.o. female in for follow up of History of rectal cancer    Full incontinence of feces    History of Present Illness  The patient is a 44-year-old female with a history of rectal cancer, currently undergoing chemotherapy for metastatic disease.    She has been experiencing frequent bowel movements, necessitating the use of Pepto-Bismol every 15 minutes. This over-the-counter medication has provided some relief. However, she has had to resort to sleeping in the bathtub for the past two nights to achieve even two hours of uninterrupted sleep. She describes an internal burning sensation that intensifies and spreads, causing her to rush to the bathroom. She has taken photographs of her stool, which she describes as a small, mushy blob that brings her to tears. She occasionally notices blood in her stool. She has lost weight and developed a fistula, which was treated with a wound VAC and home health care. She does not experience stomach aches. She is considering a colostomy and is curious about the recovery time post-surgery. She is also interested in learning about other patients' experiences with the operation. She is currently applying hydrocortisone cream 5% and magic butt paste to her rectal area. She has been taking two doses of Pepto-Bismol every hour until her symptoms subside, totaling five doses last night.    MEDICATIONS  Current: octreotide, Imodium, Lomotil, Pepto-Bismol, hydrocortisone cream, magic butt paste     Past Medical  History:   Diagnosis Date    Abdominopelvic abscess 08/12/2020    ADMITTED TO [facility]    Abnormal Pap smear of cervix 02/02/1998    JULIUS I    Abscess of abdominal wall 11/28/2012    SEEN AT [facility] ER    Anemia in pregnancy     Anxiety     Bilateral epiphora 04/2020    Bilateral hand swelling 05/02/2020    SEEN AT [facility] ER    Cervical lymphadenitis 06/06/2012    SEEN AT [facility] ER    Chemotherapy induced diarrhea 01/2021    Chemotherapy induced neutropenia 04/2020    Chemotherapy-induced nausea 02/2020    Chemotherapy-induced thrombocytopenia 05/2020    Chronic anticoagulation     Chronic diarrhea     Colon polyps     FOLLOWED BY DR. 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 ALEXANDRU HUNT    History of gestational diabetes     History of pre-eclampsia 1998    History of pre-eclampsia     History of radiation therapy 2019    FOLLOWED BY DR. JAVON LEWIS    History of TB skin testing 01/17/2009    TB Skin Test    History of TB skin testing 01/17/2009    TB Skin Test    History of transfusion 2019    12/2019    HPV in female 1998    Hypokalemia 09/2019    Hypomagnesemia 09/2019    Hyponatremia 06/2021    IBS (irritable bowel syndrome)     Ileostomy present 04/25/2020    Take down on 11/3/2022    Infected insect bite of neck 05/27/2016    SEEN AT [facility]    Kidney stone on right side 10/07/2019    Kidney stones 08/09/2007    SEEN AT [facility] ER    Low anterior  resection syndrome 2022    FOLLOWED BY DR. PADMAJA ESPARZA    Lump of right breast     SWOLLEN LYMPH NODE    Lung cancer 2020    METASTATIC LUNG CANCER    Macrocytosis 2021    Monopolar depression     On anticoagulant therapy     Pain associated with surgical procedure 2020    Palmar-plantar erythrodysesthesia 2021    Perirectal abscess 2020    Perirectal abscess 2020    Added automatically from request for surgery [id number]      Pulmonary embolism without acute cor pulmonale 09/20/2019    X 3; ADMITTED TO [facility]    Pulmonary nodule, right 2020    Rectal cancer 2019    STAGE IIA, INVASIVE MODERATELY DIFFERENTIATED ADENOCARCINOMA, CHEMO AND XRT FINISHED 2019    Right shoulder pain 2020    SEEN AT [facility] ER    Right ureteral stone 10/01/2019    SEEN AT [facility] ER    Right ureteral stone 10/01/2019    SEEN AT [facility] ER      SAB (spontaneous ) 1996     A2-1 INDUCED    Sciatica     Sepsis due to cellulitis 2002    LEFT GREAT TOE, ADMITTED TO [facility]    Tachycardia 2020    Thrombosis of superior vena cava 2020    AND BRACHIOCEPHALIC VEIN, ADMITTED TO [facility]    Urinary urgency 2020     Past Surgical History:   Procedure Laterality Date    ABDOMINAL SURGERY      CHOLECYSTECTOMY N/A 10/10/2003    LAPAROSCOPIC WITH CHOLANGIOGRAM, DR. JAMEY TALAVERA AT [facility]    COLON RESECTION N/A 2019    Procedure: laparoscopic low anterior colon resection with mobilization of splenic flexure and diverting loop ileostomy: ERAS;  Surgeon: Padmaja Esparza MD;  Location: [facility];  Service: General    COLON RESECTION N/A 2020    Procedure: LOW ANTERIOR COLON RESECTION, COLOANAL ANASTOMOSIS, MOBILIZATION SPLENIC FLEXURE;  Surgeon: Padmaja Esparza MD;  Location: [facility] OR;  Service: General;  Laterality: N/A;    COLONOSCOPY N/A 07/15/2020    PATENT ANASTAMOSIS IN MID RECTUM, RESCOPE IN 1 YR, DR. PADMAJA ESPARZA AT [facility]    COLONOSCOPY N/A 2022    DIFFUSE AREA  OF MODERATELY FRIABLE MUCOSA IN ENTIRE COLON , DIVERSION COLITIS, PATENT AND HEALTHY ANASTAMOSIS, DR. PADMAJA ESPARZA AT [facility]    COLONOSCOPY N/A 12/04/2023    6 MM SESSILE SERRATED ADENOMA POLYP IN DESCENDING, PATENT ANASTAMOSIS IN DISTAL RECTUM, RESCOPE IN 2 YRS, DR. PADMAJA ESPARZA AT [facility]    COLONOSCOPY N/A 06/04/2025    PATENT AND HEALTHY ANASTAMOSIS IN DISTAL RECTUM, RESCOPE IN 2 YRS, DR. PADMAJA ESPARZA AT [facility]    COLONOSCOPY W/ POLYPECTOMY N/A 07/08/2019    15 MM TUBULOVILLOUS ADENOMA POLYP IN HEPATIC FLEXURE, 20 MMTUBULOVILLOUS ADENOMA WITH HIGH GRADE DYSPLASIA IN RECTOSIGMOID, 4 CM MASS IN MID RECTUM, PATH: INVASIVE MODERATELY DIFFERENTIATED ADENOCARCINOMA, DR. JENNIFER LI AT [facility]    DILATATION AND EVACUATION N/A 2009    ENDOSCOPY N/A 07/08/2019    LA GRADE A ESOPHAGITIS, GASTRITIS, ALL BIOPSIES BENIGN, DR. JENNIFER LI AT [facility]    ILEOSTOMY CLOSURE N/A 11/14/2022    Procedure: ILEOSTOMY TAKEDOWN;  Surgeon: Padmaja Esparza MD;  Location: [facility];  Service: General;  Laterality: N/A;    INCISION AND DRAINAGE PERIRECTAL ABSCESS N/A 08/14/2020    Procedure: INCISION AND DRAINAGE OF retrorectal dehiscence abcess with drain placement and irrigation;  Surgeon: Padmaja Esparza MD;  Location: [facility];  Service: General;  Laterality: N/A;    PAP SMEAR N/A 01/24/2014    SIGMOIDOSCOPY N/A 07/24/2019    INFILTRATIVE PARTIALLY OBSTRUCTING LARGE RECTAL CANCER, AREA TATOOED, DR. PADMAJA ESPARZA AT [facility]    SIGMOIDOSCOPY N/A 11/23/2019    AN ULCERATED PARTIALLY OBSTRUCTING MASS IN MID RECTUM, AREA TATTOOED, DR. PADMAJA ESPARZA AT [facility]    SIGMOIDOSCOPY N/A 07/22/2021    PATENT ANASTAMOSIS IN DISTAL RECTUM, RESCOPE IN 1 YR, DR. PADMAJA ESPARZA AT [facility]    SIGMOIDOSCOPY N/A 09/11/2024    PATCHY INFLAMMATION AND EDEMA IN DESCENDING, AREA BIOPSIED AND WAS BENIGN, PATENT AND HEALTHY ANASTAMOSIS, HEMORRHOIDS,RESCOPE(CY) 12/2025, DR. PADMAJA ESPARZA AT [facility]    TRANSRECTAL ULTRASOUND N/A 07/24/2019    Procedure:  "ULTRASOUND TRANSRECTAL;  Surgeon: Padmaja Ye MD;  Location: [facility] ENDOSCOPY;  Service: General    URETEROSCOPY LASER LITHOTRIPSY WITH STENT INSERTION Right 10/30/2019    DR. ESTUARDO BEASLEY AT [place]    VAGINAL DELIVERY N/A 09/18/1998    VENOUS ACCESS DEVICE (PORT) INSERTION Left 08/09/2019    Procedure: INSERTION VENOUS ACCESS DEVICE;  Surgeon: Sj Joseph MD;  [facility];  Service: General    VENOUS ACCESS DEVICE (PORT) INSERTION N/A 12/18/2020    Procedure: INSERTION VENOUS ACCESS DEVICE right side, removal venous access device left side;  Surgeon: Sj Joseph MD;  Location: [facility];  Service: General;  Laterality: N/A;    WISDOM TOOTH EXTRACTION Bilateral 1993       /70 (BP Location: Left arm, Patient Position: Sitting, Cuff Size: Adult)   Pulse 89   Ht 167.6 cm (66\")   Wt 79.8 kg (176 lb)   LMP  (LMP Unknown)   SpO2 98%   Breastfeeding No   BMI 28.41 kg/m²   Body mass index is 28.41 kg/m².      PE:   Physical Exam    Physical Exam  Constitutional:       General: She is not in acute distress.     Appearance: She is well-developed.   HENT:      Head: Normocephalic and atraumatic.   Abdominal:      General: There is no distension.      Palpations: Abdomen is soft.      Comments: Midline incision scar.   Rlq incision scar  No hernia  Panis of skin   Musculoskeletal:         General: Normal range of motion.   Neurological:      Mental Status: She is alert.   Psychiatric:         Thought Content: Thought content normal.             Assessment & Plan  1. Rectal cancer with metastatic disease.      2. Full incontinence of feces       She has been experiencing significant discomfort and frequent bowel movements, which have been partially managed with over-the-counter Pepto-Bismol. The CAT scan indicates overflow diarrhea due to a full colon and some stenosis from radiation. A diverting colostomy on the left side is recommended to improve her quality of life. The " procedure will involve cutting the colon and bringing it up to create an ostomy, which may be done laparoscopically or through an upper midline incision. The potential risks, benefits, and alternatives were discussed. The surgery is scheduled for 07/29/2025. A prescription for baclofen suppository will be provided to help relax the pelvic muscles and alleviate the burning sensation. She is advised to continue using Pepto-Bismol as needed and to apply Vaseline internally for coating.         Patient or patient representative verbalized consent for the use of Ambient Listening during the visit with  Padmaja Ye MD for chart documentation. 7/21/2025  08:08 EDT       Electronically signed by Padmaja Ye MD at 07/21/25 0808       Vital Signs (last 2 days)       Date/Time Temp Temp src Pulse Resp BP Patient Position SpO2    07/30/25 0700 98 (36.7) Oral 72 16 138/70 Lying 96    07/29/25 2345 98.2 (36.8) Oral 81 18 126/60 -- 93    07/29/25 2045 99.1 (37.3) Oral 85 18 141/74 Lying 96    07/29/25 1542 98.1 (36.7) Oral 75 20 116/93 Lying --    07/29/25 1500 -- -- 81 -- -- -- --    07/29/25 1449 -- -- 75 -- 193/93 -- --    07/29/25 1415 -- -- 81 20 169/88 -- 98    07/29/25 1400 -- -- 72 18 185/93 -- 98    07/29/25 1345 -- -- 74 18 161/88 -- 99    07/29/25 1330 -- -- 73 18 176/101 -- 99    07/29/25 1315 -- -- 75 20 178/95 -- 98    07/29/25 1300 -- -- 77 20 169/95 -- 97    07/29/25 1245 -- -- 75 18 164/96 -- 97    07/29/25 1230 -- -- 80 20 174/88 -- 98    07/29/25 1215 -- -- 83 16 159/88 -- 98    07/29/25 1200 -- -- 84 16 190/95 -- 99    07/29/25 1155 -- -- 86 16 183/98 -- 100    07/29/25 1150 -- -- 86 16 187/99 -- 100    07/29/25 1145 97.7 (36.5) Oral 88 16 146/90 Lying 100    07/29/25 0652 98.1 (36.7) Oral 72 18 144/95 Lying 98          Oxygen Therapy (last 2 days)       Date/Time SpO2 Device (Oxygen Therapy) Flow (L/min) (Oxygen Therapy) Oxygen Concentration (%) ETCO2 (mmHg)    07/30/25 0700 96 -- -- -- --    07/30/25  0030 -- room air -- -- --    07/29/25 2345 93 room air -- -- --    07/29/25 2045 96 room air -- -- --    07/29/25 2030 -- room air -- -- --    07/29/25 1600 -- room air -- -- --    07/29/25 1415 98 -- 2 -- --    07/29/25 1400 98 -- -- -- --    07/29/25 1345 99 -- -- -- --    07/29/25 1330 99 -- -- -- --    07/29/25 1315 98 -- -- -- --    07/29/25 1300 97 -- -- -- --    07/29/25 1245 97 -- -- -- --    07/29/25 1230 98 -- -- -- --    07/29/25 1215 98 -- -- -- --    07/29/25 1200 99 -- -- -- --    07/29/25 1155 100 -- -- -- --    07/29/25 1150 100 -- -- -- --    07/29/25 1145 100 nasal cannula 3 -- --    07/29/25 0652 98 room air -- -- --          Lines, Drains & Airways       Active LDAs       Name Placement date Placement time Site Days    Peripheral IV 07/29/25 0651 20 G Anterior;Left Forearm 07/29/25  0651  Forearm  1    Peripheral IV 07/29/25 0640 18 G Posterior;Right Wrist 07/29/25  0640  Wrist  1    Colostomy LLQ 07/29/25  --  LLQ  1    Single Lumen Implantable Port 12/18/20 Right Chest 12/18/20  1200  Chest  1684             Current Facility-Administered Medications   Medication Dose Route Frequency Provider Last Rate Last Admin    acetaminophen (TYLENOL) tablet 1,000 mg  1,000 mg Oral Q6H Padmaja Ye MD   1,000 mg at 07/30/25 0625    alvimopan (ENTEREG) capsule 12 mg  12 mg Oral BID Padmaja Ye MD   12 mg at 07/30/25 0830    celecoxib (CeleBREX) capsule 200 mg  200 mg Oral Q12H Padmaja Ye MD   200 mg at 07/30/25 0830    cetirizine (zyrTEC) tablet 10 mg  10 mg Oral Daily Padmaja Ye MD   10 mg at 07/30/25 0830    enoxaparin sodium (LOVENOX) syringe 80 mg  1 mg/kg Subcutaneous Q12H Padmaja Ye MD   80 mg at 07/30/25 0830    escitalopram (LEXAPRO) tablet 10 mg  10 mg Oral Nightly Padmaja Ye MD   10 mg at 07/29/25 2048    famotidine (PEPCID) tablet 20 mg  20 mg Oral BID Lurdes Soriano PA-C        gabapentin (NEURONTIN) capsule 400 mg  400 mg Oral Q8H Padmaja Ye MD   400  mg at 07/30/25 0625    HYDROmorphone (DILAUDID) injection 0.25 mg  0.25 mg Intravenous Q3H PRN Padmaja Ye MD   0.25 mg at 07/30/25 0646    And    naloxone (NARCAN) injection 0.4 mg  0.4 mg Intravenous Q5 Min PRN Padmaja Ye MD        LORazepam (ATIVAN) tablet 0.5 mg  0.5 mg Oral Q8H PRN Padmaja Ye MD        Methocarbamol (ROBAXIN) injection 500 mg  500 mg Intravenous Q8H PRN Padmaja Ye MD        metoprolol succinate XL (TOPROL-XL) 24 hr tablet 12.5 mg  12.5 mg Oral Nightly Lurdes Tucker PA-C        nitroglycerin (NITROSTAT) SL tablet 0.4 mg  0.4 mg Sublingual Q5 Min PRN Padmaja Ye MD        ondansetron (ZOFRAN) injection 4 mg  4 mg Intravenous Q6H PRN Padmaja Ye MD   4 mg at 07/29/25 2047    pantoprazole (PROTONIX) EC tablet 40 mg  40 mg Oral Q AM Padmaja Ye MD        scopolamine patch 1 mg/72 hr  1 patch Transdermal Continuous Padmaja Ye MD   1 patch at 07/29/25 0707     Lab Results (all)       Procedure Component Value Units Date/Time    Basic Metabolic Panel [[id number]]  (Abnormal) Collected: 07/30/25 0618    Specimen: Blood Updated: 07/30/25 0715     Glucose 97 mg/dL      BUN 7.0 mg/dL      Creatinine 0.45 mg/dL      Sodium 142 mmol/L      Potassium 3.4 mmol/L      Chloride 108 mmol/L      CO2 25.0 mmol/L      Calcium 8.5 mg/dL      BUN/Creatinine Ratio 15.6     Anion Gap 9.0 mmol/L      eGFR 121.1 mL/min/1.73     Narrative:      GFR Categories in Chronic Kidney Disease (CKD)              GFR Category          GFR (mL/min/1.73)    Interpretation  G1                    90 or greater        Normal or high (1)  G2                    60-89                Mild decrease (1)  G3a                   45-59                Mild to moderate decrease  G3b                   30-44                Moderate to severe decrease  G4                    15-29                Severe decrease  G5                    14 or less           Kidney failure    (1)In the absence of evidence of  kidney disease, neither GFR category G1 or G2 fulfill the criteria for CKD.    eGFR calculation 2021 CKD-EPI creatinine equation, which does not include race as a factor    Magnesium [[id number]]  (Normal) Collected: 07/30/25 0618    Specimen: Blood Updated: 07/30/25 0715     Magnesium 2.1 mg/dL     CBC & Differential [[id number]]  (Abnormal) Collected: 07/30/25 0618    Specimen: Blood Updated: 07/30/25 0640    Narrative:      The following orders were created for panel order CBC & Differential.  Procedure                               Abnormality         Status                     ---------                               -----------         ------                     CBC Auto Differential[[id number]]        Abnormal            Final result                 Please view results for these tests on the individual orders.    CBC Auto Differential [[id number]]  (Abnormal) Collected: 07/30/25 0618    Specimen: Blood Updated: 07/30/25 0640     WBC 8.81 10*3/mm3      RBC 3.89 10*6/mm3      Hemoglobin 12.3 g/dL      Hematocrit 37.0 %      MCV 95.1 fL      MCH 31.6 pg      MCHC 33.2 g/dL      RDW 13.0 %      RDW-SD 44.7 fl      MPV 10.1 fL      Platelets 150 10*3/mm3      Neutrophil % 75.3 %      Lymphocyte % 14.9 %      Monocyte % 9.4 %      Eosinophil % 0.1 %      Basophil % 0.1 %      Immature Grans % 0.2 %      Neutrophils, Absolute 6.63 10*3/mm3      Lymphocytes, Absolute 1.31 10*3/mm3      Monocytes, Absolute 0.83 10*3/mm3      Eosinophils, Absolute 0.01 10*3/mm3      Basophils, Absolute 0.01 10*3/mm3      Immature Grans, Absolute 0.02 10*3/mm3      nRBC 0.0 /100 WBC     Tissue Pathology Exam [[id number]] Collected: 07/29/25 1102    Specimen: Tissue from Large Intestine, Left / Descending Colon Updated: 07/29/25 1259    POC Urine Pregnancy [[id number]] Collected: 07/29/25 0705    Specimen: Urine Updated: 07/29/25 0742     HCG, Urine, QL Negative     Lot Number [id number]     Internal Positive Control Positive     Internal Negative  Control Negative     Expiration Date 2026-12-09          ECG/EMG Results (all)       Procedure Component Value Units Date/Time    Telemetry Scan [[id number]] Resulted: 07/29/25 2201     Updated: 07/30/25 0507    Telemetry Scan [[id number]] Resulted: 07/30/25 0409     Updated: 07/30/25 0507             Operative/Procedure Notes (all)        Padmaja Ye MD at 07/29/25 0814  Version 1 of 1         COLON RESECTION LOW ANTERIOR LAPAROSCOPIC WITH DAVINCI ROBOT  Progress Note    Carole Weaver  7/29/2025    Pre-op Diagnosis:   History of rectal cancer [Z85.048]  Full incontinence of feces [R15.9]       Post-Op Diagnosis Codes:     * History of rectal cancer [Z85.048]     * Full incontinence of feces [R15.9]    Procedure(s):      Procedure(s):  COLON RESECTION LOW ANTERIOR LAPAROSCOPIC WITH DAVINCI ROBOT, LYSIS OF ADHESIONS, END COLOSTOMY FORMATION      Surgeon(s):  Padmaja Ye MD    Anesthesia: General with Block    Staff:   Circulator: Katiuska Colón, VASU; Lala Etienne RN  Scrub Person: Hortensia Gallagher Cristy A, RN  Assistant: Charissa Messina PA-C  Assistant: Charissa Messina PA-C    Estimated Blood Loss: 100 mL    Urine Voided: 220 mL    Specimens:                Specimens       ID Source Type Tests Collected By Collected At Frozen?    A Large Intestine, Left / Descending Colon Tissue TISSUE PATHOLOGY EXAM   Padmaja Ye MD 7/29/25 1102 No    Description: DESCENDING & SIGMOID COLON    Comment: DESCENDING & SIGMOID COLON              Drains:   Urethral Catheter Silicone 16 Fr. (Active)   Daily Indications Selected surgeries ( tract, abdomen) 07/29/25 1145   Site Assessment Clean;Skin intact 07/29/25 1145   Collection Container Standard drainage bag 07/29/25 1145   Securement Method Securing device 07/29/25 1145       [REMOVED] NG/OG Tube Nasogastric 16 Fr Right nostril (Removed)       Findings:       Complications:     Assistant: Charissa Messina PA-C  was responsible for performing  the following activities: Retraction, Suction, Irrigation, Suturing, Closing, and Placing Dressing and their skilled assistance was necessary for the success of this case.    Padmaja Ye MD     Date: 7/29/2025  Time: 12:13 EDT          Electronically signed by Padmaja Ye MD at 07/29/25 1213          Physician Progress Notes (all)        Lurdes Tucker PA-C at 07/30/25 0806          Colorectal Surgery Progress Note    POD 1     S: positive flatus,  positive BM. positive ambulating. Pain controlled with ERAS and occasional Dilaudid.  Tolerating clear liquids. Urinating without difficulty.    O:  Temp:  [97.7 °F (36.5 °C)-99.1 °F (37.3 °C)] 98 °F (36.7 °C)  Heart Rate:  [72-88] 72  Resp:  [16-20] 16  BP: (116-193)/() 138/70    Intake/Output Summary (Last 24 hours) at 7/30/2025 0806  Last data filed at 7/30/2025 0630  Gross per 24 hour   Intake 1730 ml   Output 1345 ml   Net 385 ml     Abd: soft, non-distended. Colostomy pink, patent, productive of soft brown stool.  Incisions: clean, dry, intact, no erythema    Results from last 7 days   Lab Units 07/30/25  0618   WBC 10*3/mm3 8.81   HEMOGLOBIN g/dL 12.3   HEMATOCRIT % 37.0   PLATELETS 10*3/mm3 150     Results from last 7 days   Lab Units 07/30/25  0618   SODIUM mmol/L 142   POTASSIUM mmol/L 3.4*   CHLORIDE mmol/L 108*   CO2 mmol/L 25.0   BUN mg/dL 7.0   CREATININE mg/dL 0.45*   EGFR mL/min/1.73 121.1   GLUCOSE mg/dL 97   CALCIUM mg/dL 8.5*     Results from last 7 days   Lab Units 07/30/25  0618   MAGNESIUM mg/dL 2.1     Assessment & Plan  1. Postoperative status following robot-assisted laparoscopic low anterior colon resection with lysis of adhesions and end colostomy formation.  A morning BMP has been ordered for tomorrow to monitor her potassium levels  Advance to full liquids  WOCN consulted  Continue mobilizing  Restart home metoprolol  Restart home famotidine     Lurdes Tucker PA-C 07/30/25 08:06 EDT      Contains text generated by PEDRO  Copilot  Electronically signed by Lurdes Tucker PA-C at 07/30/25 3391       Consult Notes (all)    No notes of this type exist for this encounter.

## 2025-07-30 NOTE — CASE MANAGEMENT/SOCIAL WORK
Discharge Planning Assessment  [facility]     Patient Name: Carole Weaver  MRN: [id number]  Today's Date: 7/30/2025    Admit Date: 7/29/2025    Plan: Home with family. Denies need for HH. Family transport   Discharge Needs Assessment       Row Name 07/30/25 1639       Living Environment    People in Home spouse;child(zandra), dependent    Name(s) of People in Home Pt live with her spouse and son    Current Living Arrangements home    Potentially Unsafe Housing Conditions none    In the past 12 months has the electric, gas, oil, or water company threatened to shut off services in your home? No    Primary Care Provided by self    Provides Primary Care For child(zandra)    Family Caregiver if Needed spouse    Quality of Family Relationships helpful;involved;supportive    Able to Return to Prior Arrangements yes       Resource/Environmental Concerns    Resource/Environmental Concerns none    Transportation Concerns none       Transportation Needs    In the past 12 months, has lack of transportation kept you from medical appointments or from getting medications? no    In the past 12 months, has lack of transportation kept you from meetings, work, or from getting things needed for daily living? No       Food Insecurity    Within the past 12 months, you worried that your food would run out before you got the money to buy more. Never true    Within the past 12 months, the food you bought just didn't last and you didn't have money to get more. Never true       Transition Planning    Patient/Family Anticipates Transition to home with family    Patient/Family Anticipated Services at Transition none  States she does not need HH at D/C.    Transportation Anticipated family or friend will provide       Discharge Needs Assessment    Readmission Within the Last 30 Days no previous admission in last 30 days    Equipment Currently Used at Home none    Concerns to be Addressed discharge planning;care coordination/care conferences    Do  you want help finding or keeping work or a job? I do not need or want help    Do you want help with school or training? For example, starting or completing job training or getting a high school diploma, GED or equivalent No    Anticipated Changes Related to Illness none    Equipment Needed After Discharge colostomy/ostomy supplies    Provided Post Acute Provider List? N/A    Provided Post Acute Provider Quality & Resource List? N/A    Offered/Gave Vendor List no    Patient's Choice of Community Agency(s) Denies need for HH    Current Discharge Risk chronically ill                   Discharge Plan       Row Name 07/30/25 3308       Plan    Plan Home with family. Denies need for HH. Family transport    Patient/Family in Agreement with Plan yes    Plan Comments Met with pt at bedside. Pt lives with her spouse and son in a single-story house with a basement. There are 3 steps to enter home. Uses no home DME. Pt is independent with ADLs at baseline. Pt has never been to Rehab. She has used [facility]. Pt denies need for HH at D/C. At discharge, family will transport.  Lebron Muniz RN-BC                  Continued Care and Services - Admitted Since 7/29/2025       Home Medical Care Coordination complete.      Service Provider Request Status Services Address Phone Fax Patient Preferred    [address], [address], [place] [address] [phone] --                  Expected Discharge Date and Time       Expected Discharge Date Expected Discharge Time    Aug 1, 2025            Demographic Summary       Row Name 07/30/25 1634       General Information    Admission Type inpatient    Arrived From PACU/recovery room    Reason for Consult care coordination/care conference;discharge planning    Preferred Language English                   Functional Status       Row Name 07/30/25 1639       Functional Status    Usual Activity Tolerance good    Current  Activity Tolerance moderate       Functional Status, IADL    Medications independent    Meal Preparation independent    Housekeeping independent    Laundry independent    Shopping independent    If for any reason you need help with day-to-day activities such as bathing, preparing meals, shopping, managing finances, etc., do you get the help you need? I don't need any help       Mental Status    General Appearance WDL WDL       Mental Status Summary    Recent Changes in Mental Status/Cognitive Functioning no changes       Employment/    Employment Status unemployed                         Lebron Muniz, RN

## 2025-07-30 NOTE — PLAN OF CARE
Goal Outcome Evaluation:  Plan of Care Reviewed With: patient        Progress: improving  Outcome Evaluation: Vital signs stable. Pain managed with ERAS and PRN dilaudid given x2. Ambulated 3 laps around nurses station. 75mL stool output from ostomy. [place] care complete, removed as ordered, due to void. Family at bedside. Room air. Safety maintained. Plan of care ongoing.

## 2025-07-30 NOTE — DISCHARGE PLACEMENT REQUEST
"Carole Weaver (44 y.o. Female)       Date of Birth   1980    Social Security Number       [address] [place] [address]    Home Phone   [phone]    MRN   [id number]       Denominational   Mormonism    Marital Status                               Admission Date   7/29/2025    Admission Type   Elective    Admitting Provider   Padmaja Ye MD    Attending Provider   Padmaja Ye MD    Department, Room/Bed   [address], E[address]/1       Discharge Date       Discharge Disposition       Discharge Destination                                 Attending Provider: Padmaja Ye MD    Allergies: No Known Allergies    Isolation: None   Infection: None   Code Status: CPR    Ht: 167.6 cm (66\")   Wt: 83.3 kg (183 lb 10.3 oz)    Admission Cmt: None   Principal Problem: Full incontinence of feces [R15.9]                   Active Insurance as of 7/29/2025       Primary Coverage       Payor Plan Insurance Group Employer/Plan Group    [employer] CROSS BLUE SHIELD PPO [id number]       Payor Plan Address Payor Plan Phone Number Payor Plan Fax Number Effective Dates    [address] [phone]  1/1/2018 - None Entered    [facility] [id number]         Subscriber Name Subscriber Birth Date Member ID       JUSTUS WEAVER 1980 [id number]                     Emergency Contacts        (Rel.) Home Phone Work Phone Mobile Phone    Justus Weaver (Spouse) -- -- [phone]    ROSEANNEDENIS (Mother) [phone] -- [phone]    Matt Weaver (Son) -- -- [phone]    Patrick Garcia (Father) [phone] -- [phone]              Signed,  Daksha Cam RN, BSN, BA  Post Acute Care Coordinator  [facility]  (C)[phone]  (O)[phone]    "

## 2025-07-30 NOTE — NURSING NOTE
"   07/30/25 1210   Colostomy LLQ   Placement date: If unknown, DO NOT use \"Add Comment\" note: 07/29/25   Inserted by: Dr. Ye  Location: LLQ   Stomal Appliance 2 piece;Changed;Leaking;Drainable   Stoma Appearance round;rosebud appearance;moist;red;protruding above skin level   Peristomal Assessment Clean;Intact;Pink   Accessories/Skin Care cleansed with water;skin barrier ring;skin barrier strip   Stoma Function flatus;stool;serosanguineous   Stool Consistency liquid   Treatment Bag change;Site care     CWCN: We are seeing a patient for follow-up ostomy care. She has a history of rectal cancer, fecal incontinence, and a new colostomy formation.  She is familiar with ostomy care due to her previous ostomy and is independent.  The existing appliance was leaking and was removed. Ostomy care was performed.  Dariel 2-piece 2 3/4 barrier ring and elastic barrier strip were used to support the ostomy baseplate.  [employer] information was provided.  Supplies left in the room.  Ostomy care  team will follow up 1 time a wk and as needed.  "

## 2025-07-30 NOTE — PROGRESS NOTES
Colorectal Surgery Progress Note    POD 1     S: positive flatus,  positive BM. positive ambulating. Pain controlled with ERAS and occasional Dilaudid.  Tolerating clear liquids. Urinating without difficulty.    O:  Temp:  [97.7 °F (36.5 °C)-99.1 °F (37.3 °C)] 98 °F (36.7 °C)  Heart Rate:  [72-88] 72  Resp:  [16-20] 16  BP: (116-193)/() 138/70    Intake/Output Summary (Last 24 hours) at 7/30/2025 0806  Last data filed at 7/30/2025 0630  Gross per 24 hour   Intake 1730 ml   Output 1345 ml   Net 385 ml     Abd: soft, non-distended. Colostomy pink, patent, productive of soft brown stool.  Incisions: clean, dry, intact, no erythema    Results from last 7 days   Lab Units 07/30/25  0618   WBC 10*3/mm3 8.81   HEMOGLOBIN g/dL 12.3   HEMATOCRIT % 37.0   PLATELETS 10*3/mm3 150     Results from last 7 days   Lab Units 07/30/25  0618   SODIUM mmol/L 142   POTASSIUM mmol/L 3.4*   CHLORIDE mmol/L 108*   CO2 mmol/L 25.0   BUN mg/dL 7.0   CREATININE mg/dL 0.45*   EGFR mL/min/1.73 121.1   GLUCOSE mg/dL 97   CALCIUM mg/dL 8.5*     Results from last 7 days   Lab Units 07/30/25  0618   MAGNESIUM mg/dL 2.1     Assessment & Plan  1. Postoperative status following robot-assisted laparoscopic low anterior colon resection with lysis of adhesions and end colostomy formation.  A morning BMP has been ordered for tomorrow to monitor her potassium levels  Advance to full liquids  WOCN consulted  Continue mobilizing  Restart home metoprolol  Restart home famotidine     Lurdes Tucker PA-C 07/30/25 08:06 EDT

## 2025-07-30 NOTE — PROGRESS NOTES
CRS attending  Patient doing well  Tolerating fulls  Had a lot of stool output    Afebrile vital signs stable  Abdomen soft  Ostomy pink patent and productive . It is a little edematous    Labs reviewed  Status post low anterior resection with end colostomy  Advance as tolerates  Continue ambulation

## 2025-07-31 LAB
ANION GAP SERPL CALCULATED.3IONS-SCNC: 6.6 MMOL/L (ref 5–15)
BUN SERPL-MCNC: 7 MG/DL (ref 6–20)
BUN/CREAT SERPL: 17.1 (ref 7–25)
CALCIUM SPEC-SCNC: 8.4 MG/DL (ref 8.6–10.5)
CHLORIDE SERPL-SCNC: 109 MMOL/L (ref 98–107)
CO2 SERPL-SCNC: 26.4 MMOL/L (ref 22–29)
CREAT SERPL-MCNC: 0.41 MG/DL (ref 0.57–1)
CYTO UR: NORMAL
EGFRCR SERPLBLD CKD-EPI 2021: 123.8 ML/MIN/1.73
GLUCOSE SERPL-MCNC: 124 MG/DL (ref 65–99)
LAB AP CASE REPORT: NORMAL
LAB AP CLINICAL INFORMATION: NORMAL
PATH REPORT.FINAL DX SPEC: NORMAL
PATH REPORT.GROSS SPEC: NORMAL
POTASSIUM SERPL-SCNC: 3.2 MMOL/L (ref 3.5–5.2)
POTASSIUM SERPL-SCNC: 3.4 MMOL/L (ref 3.5–5.2)
SODIUM SERPL-SCNC: 142 MMOL/L (ref 136–145)

## 2025-07-31 PROCEDURE — 80048 BASIC METABOLIC PNL TOTAL CA: CPT | Performed by: PHYSICIAN ASSISTANT

## 2025-07-31 PROCEDURE — 25010000002 ENOXAPARIN PER 10 MG: Performed by: COLON & RECTAL SURGERY

## 2025-07-31 PROCEDURE — 25010000002 METHOCARBAMOL 1000 MG/10ML SOLUTION: Performed by: COLON & RECTAL SURGERY

## 2025-07-31 PROCEDURE — 25010000002 POTASSIUM CHLORIDE 10 MEQ/100ML SOLUTION: Performed by: COLON & RECTAL SURGERY

## 2025-07-31 PROCEDURE — 97162 PT EVAL MOD COMPLEX 30 MIN: CPT

## 2025-07-31 PROCEDURE — 25010000002 ONDANSETRON PER 1 MG: Performed by: COLON & RECTAL SURGERY

## 2025-07-31 PROCEDURE — 25010000002 HYDROMORPHONE PER 4 MG: Performed by: COLON & RECTAL SURGERY

## 2025-07-31 PROCEDURE — 84132 ASSAY OF SERUM POTASSIUM: CPT | Performed by: PHYSICIAN ASSISTANT

## 2025-07-31 PROCEDURE — 99024 POSTOP FOLLOW-UP VISIT: CPT | Performed by: PHYSICIAN ASSISTANT

## 2025-07-31 RX ORDER — METOCLOPRAMIDE 5 MG/1
5 TABLET ORAL
Status: DISCONTINUED | OUTPATIENT
Start: 2025-07-31 | End: 2025-08-01 | Stop reason: HOSPADM

## 2025-07-31 RX ORDER — POLYETHYLENE GLYCOL 3350 17 G/17G
17 POWDER, FOR SOLUTION ORAL ONCE
Status: COMPLETED | OUTPATIENT
Start: 2025-07-31 | End: 2025-07-31

## 2025-07-31 RX ORDER — POTASSIUM CHLORIDE 7.45 MG/ML
10 INJECTION INTRAVENOUS
Status: COMPLETED | OUTPATIENT
Start: 2025-07-31 | End: 2025-07-31

## 2025-07-31 RX ORDER — POTASSIUM CHLORIDE 1500 MG/1
40 TABLET, EXTENDED RELEASE ORAL EVERY 4 HOURS
Status: COMPLETED | OUTPATIENT
Start: 2025-07-31 | End: 2025-08-01

## 2025-07-31 RX ORDER — POTASSIUM CHLORIDE 1500 MG/1
40 TABLET, EXTENDED RELEASE ORAL EVERY 4 HOURS
Status: DISCONTINUED | OUTPATIENT
Start: 2025-07-31 | End: 2025-07-31

## 2025-07-31 RX ADMIN — ACETAMINOPHEN 1000 MG: 500 TABLET, FILM COATED ORAL at 11:57

## 2025-07-31 RX ADMIN — ACETAMINOPHEN 1000 MG: 500 TABLET, FILM COATED ORAL at 06:43

## 2025-07-31 RX ADMIN — ENOXAPARIN SODIUM 80 MG: 100 INJECTION SUBCUTANEOUS at 08:57

## 2025-07-31 RX ADMIN — ALVIMOPAN 12 MG: 12 CAPSULE ORAL at 20:06

## 2025-07-31 RX ADMIN — METOPROLOL SUCCINATE 12.5 MG: 25 TABLET, EXTENDED RELEASE ORAL at 20:07

## 2025-07-31 RX ADMIN — METHOCARBAMOL 500 MG: 1000 INJECTION, SOLUTION INTRAMUSCULAR; INTRAVENOUS at 06:04

## 2025-07-31 RX ADMIN — HYDROMORPHONE HYDROCHLORIDE 0.25 MG: 1 INJECTION, SOLUTION INTRAMUSCULAR; INTRAVENOUS; SUBCUTANEOUS at 21:11

## 2025-07-31 RX ADMIN — CETIRIZINE HYDROCHLORIDE 10 MG: 10 TABLET, FILM COATED ORAL at 08:56

## 2025-07-31 RX ADMIN — CELECOXIB 200 MG: 200 CAPSULE ORAL at 20:07

## 2025-07-31 RX ADMIN — HYDROMORPHONE HYDROCHLORIDE 0.25 MG: 1 INJECTION, SOLUTION INTRAMUSCULAR; INTRAVENOUS; SUBCUTANEOUS at 18:04

## 2025-07-31 RX ADMIN — METHOCARBAMOL 500 MG: 1000 INJECTION, SOLUTION INTRAMUSCULAR; INTRAVENOUS at 18:20

## 2025-07-31 RX ADMIN — ACETAMINOPHEN 1000 MG: 500 TABLET, FILM COATED ORAL at 00:31

## 2025-07-31 RX ADMIN — GABAPENTIN 400 MG: 400 CAPSULE ORAL at 20:10

## 2025-07-31 RX ADMIN — HYDROMORPHONE HYDROCHLORIDE 0.25 MG: 1 INJECTION, SOLUTION INTRAMUSCULAR; INTRAVENOUS; SUBCUTANEOUS at 04:37

## 2025-07-31 RX ADMIN — POTASSIUM CHLORIDE 40 MEQ: 1500 TABLET, EXTENDED RELEASE ORAL at 23:06

## 2025-07-31 RX ADMIN — LORAZEPAM 0.5 MG: 0.5 TABLET ORAL at 04:43

## 2025-07-31 RX ADMIN — ESCITALOPRAM 10 MG: 10 TABLET, FILM COATED ORAL at 20:09

## 2025-07-31 RX ADMIN — ACETAMINOPHEN 1000 MG: 500 TABLET, FILM COATED ORAL at 18:04

## 2025-07-31 RX ADMIN — ALVIMOPAN 12 MG: 12 CAPSULE ORAL at 08:56

## 2025-07-31 RX ADMIN — FAMOTIDINE 20 MG: 20 TABLET, FILM COATED ORAL at 18:04

## 2025-07-31 RX ADMIN — POTASSIUM CHLORIDE 10 MEQ: 7.46 INJECTION, SOLUTION INTRAVENOUS at 06:44

## 2025-07-31 RX ADMIN — METOCLOPRAMIDE 5 MG: 5 TABLET ORAL at 11:57

## 2025-07-31 RX ADMIN — ONDANSETRON 4 MG: 2 INJECTION, SOLUTION INTRAMUSCULAR; INTRAVENOUS at 06:44

## 2025-07-31 RX ADMIN — LORAZEPAM 0.5 MG: 0.5 TABLET ORAL at 23:06

## 2025-07-31 RX ADMIN — LORAZEPAM 0.5 MG: 0.5 TABLET ORAL at 14:39

## 2025-07-31 RX ADMIN — GABAPENTIN 400 MG: 400 CAPSULE ORAL at 14:39

## 2025-07-31 RX ADMIN — FAMOTIDINE 20 MG: 20 TABLET, FILM COATED ORAL at 06:44

## 2025-07-31 RX ADMIN — POLYETHYLENE GLYCOL 3350 17 G: 17 POWDER, FOR SOLUTION ORAL at 11:57

## 2025-07-31 RX ADMIN — GABAPENTIN 400 MG: 400 CAPSULE ORAL at 06:44

## 2025-07-31 RX ADMIN — POTASSIUM CHLORIDE 10 MEQ: 7.46 INJECTION, SOLUTION INTRAVENOUS at 14:40

## 2025-07-31 RX ADMIN — ENOXAPARIN SODIUM 80 MG: 100 INJECTION SUBCUTANEOUS at 20:05

## 2025-07-31 RX ADMIN — ACETAMINOPHEN 1000 MG: 500 TABLET, FILM COATED ORAL at 23:11

## 2025-07-31 RX ADMIN — HYDROMORPHONE HYDROCHLORIDE 0.25 MG: 1 INJECTION, SOLUTION INTRAMUSCULAR; INTRAVENOUS; SUBCUTANEOUS at 09:31

## 2025-07-31 RX ADMIN — POTASSIUM CHLORIDE 10 MEQ: 7.46 INJECTION, SOLUTION INTRAVENOUS at 09:13

## 2025-07-31 RX ADMIN — POTASSIUM CHLORIDE 10 MEQ: 7.46 INJECTION, SOLUTION INTRAVENOUS at 11:57

## 2025-07-31 RX ADMIN — METOCLOPRAMIDE 5 MG: 5 TABLET ORAL at 18:04

## 2025-07-31 RX ADMIN — CELECOXIB 200 MG: 200 CAPSULE ORAL at 08:56

## 2025-07-31 NOTE — PLAN OF CARE
Goal Outcome Evaluation:              Outcome Evaluation: VSS. Ostomy output decreased overnight. 1x miralax given per Dr. Ye. CLD. +ambulation. PRN pain meds and ativan given for pain and anxiety. Continue ERAS. No other complaints. Needs met at this time.

## 2025-07-31 NOTE — PLAN OF CARE
Goal Outcome Evaluation:  Plan of Care Reviewed With: patient        Progress: improving  Outcome Evaluation: Vital signs stable. Pain managed with ERAS and PRN dilaudid given x2. PRN robaxin and ativan given for spasms. Morning labs returned Potassium 3.2, replacement protocol ordered and initiated. Ostomy output light brown liquid output. PRN zofran given x1 for nausea. Safety maintained. Plan of care ongoing.

## 2025-07-31 NOTE — PROGRESS NOTES
CRS attending  Patient passing gas  Still having some waves of abdominal cramping  Feels a little bit more distended    Afebrile vital signs are stable  Abdomen is soft ostomy is edematous    Status post low anterior resection with end colostomy  Keep on clears  Concerned about ostomy being so edematous that it causes a pseudo obstruction until the swelling goes down.  Encouraged patient to stay on clears for now.

## 2025-07-31 NOTE — PROGRESS NOTES
Colorectal Surgery Progress Note    POD 2     S: positive BM. positive ambulating. Pain controlled with ERAS and occasional Dilaudid.  Tolerating regular diet. Urinating without difficulty.    O:  Temp:  [98.2 °F (36.8 °C)-98.6 °F (37 °C)] 98.6 °F (37 °C)  Heart Rate:  [81-93] 83  Resp:  [16-18] 18  BP: (135-167)/(76-86) 167/86    Intake/Output Summary (Last 24 hours) at 7/31/2025 0814  Last data filed at 7/31/2025 0500  Gross per 24 hour   Intake 0 ml   Output 1150 ml   Net -1150 ml     Abd: soft, non-distended. Colostomy edematous, pink; 15 mL thin liquid stool in pouch that has not been emptied since 1 PM yesterday.   Incisions: clean, dry, intact, no erythema    Results from last 7 days   Lab Units 07/31/25  0335   SODIUM mmol/L 142   POTASSIUM mmol/L 3.2*   CHLORIDE mmol/L 109*   CO2 mmol/L 26.4   BUN mg/dL 7.0   CREATININE mg/dL 0.41*   EGFR mL/min/1.73 123.8   GLUCOSE mg/dL 124*   CALCIUM mg/dL 8.4*     Assessment & Plan   44 y.o. female s/p COLON RESECTION LOW ANTERIOR LAPAROSCOPIC WITH DAVINCI ROBOT, LYSIS OF ADHESIONS, END COLOSTOMY FORMATION  Replacing potassium.  To be rechecked this afternoon after replacement.  1 dose of MiraLAX ordered due to not much colostomy output since yesterday afternoon.  Also starting Reglan. Deescalate diet to clear liquids.  Will continue to monitor today. Continue mobilizing     Lurdes Tucker PA-C 07/31/25 08:14 EDT

## 2025-07-31 NOTE — PAYOR COMM NOTE
"CLINICAL UPDATES: REF# ref# [id number]     FAX: [phone]   PH: [phone]    [facility]: NPI [id number]  [facility]:  [id number]    MARCIO VELASCO RN,CCP    Carole Weaver (44 y.o. Female)       Date of Birth   1980    Social Security Number       Address   [address] [place] [address]    Home Phone   [phone]    MRN   [id number]       P[employer]ism   Evangelical    Marital Status                               Admission Date   7/29/2025    Admission Type   Elective    Admitting Provider   Padmaja Ye MD    Attending Provider   Padmaja Ye MD    Department, Room/Bed   [address], E452/1       Discharge Date       Discharge Disposition       Discharge Destination                                 Attending Provider: Padmaja Ye MD    Allergies: No Known Allergies    Isolation: None   Infection: None   Code Status: CPR    Ht: 167.6 cm (66\")   Wt: 83.3 kg (183 lb 10.3 oz)    Admission Cmt: None   Principal Problem: Full incontinence of feces [R15.9]                   Active Insurance as of 7/29/2025       Primary Coverage       Payor Plan Insurance Group Employer/Plan Group    [employer]HE[employer] BLUE CROSS AN[employer] BLUE CROSS BLUE [employer]IELD PPO [id number]       Payor Plan Address Payor Plan Phone Number Payor Plan Fax Number Effective Dates    [address] [phone]  1/1/2018 - None Entered    [place] [address]         Subscriber Name Subscriber Birth Date Member ID       JUSTUS WEAVER 1980 [id number]                     Emergency Contacts        (Rel.) Home Phone Work Phone Mobile Phone    Justus Weaver (Spouse) -- -- [phone]    DENIS CRONIN (Mother) [phone] -- [phone]    Matt Weaver (Son) -- -- [phone]    Patrick Cronin (Father) [phone] -- [phone]              Vital Signs (last day)       Date/Time Temp Temp src Pulse Resp BP Patient Position SpO2    07/31/25 1259 98.1 (36.7) Oral 87 16 142/80 " Lying 98    07/31/25 0726 98.6 (37) Oral 83 18 167/86 Lying 98    07/30/25 2337 98.6 (37) Oral 93 16 140/76 Lying 95    07/30/25 2010 98.2 (36.8) Oral 86 16 135/76 Lying 96    07/30/25 1300 98.4 (36.9) Oral 81 16 140/78 Lying 97    07/30/25 0700 98 (36.7) Oral 72 16 138/70 Lying 96          Oxygen Therapy (last day)       Date/Time SpO2 Device (Oxygen Therapy) Flow (L/min) (Oxygen Therapy) Oxygen Concentration (%) ETCO2 (mmHg)    07/31/25 1440 -- room air -- -- --    07/31/25 1259 98 -- -- -- --    07/31/25 0910 -- room air -- -- --    07/31/25 0726 98 -- -- -- --    07/31/25 0015 -- room air -- -- --    07/30/25 2337 95 room air -- -- --    07/30/25 2040 -- room air -- -- --    07/30/25 2010 96 room air -- -- --    07/30/25 1446 -- room air -- -- --    07/30/25 1300 97 room air -- -- --    07/30/25 0830 -- room air -- -- --    07/30/25 0700 96 room air -- -- --    07/30/25 0030 -- room air -- -- --          Intake & Output (last day)         07/30 0701 07/31 0700 07/31 0701  08/01 0700    P.O. 0     I.V. (mL/kg)      Total Intake(mL/kg) 0 (0)     Urine (mL/kg/hr) 500 (0.3)     Stool 650 100    Blood      Total Output 1150 100    Net -1150 -100                Lines, Drains & Airways       Active LDAs       Name Placement date Placement time Site Days    Peripheral IV 07/29/25 0651 20 G Anterior;Left Forearm 07/29/25  0651  Forearm  2    Peripheral IV 07/29/25 0640 18 G Posterior;Right Wrist 07/29/25  0640  Wrist  2    Colostomy LLQ 07/29/25  --  LLQ  2    Single Lumen Implantable Port 12/18/20 Right Chest 12/18/20  1200  Chest  1686             Current Facility-Administered Medications   Medication Dose Route Frequency Provider Last Rate Last Admin    acetaminophen (TYLENOL) tablet 1,000 mg  1,000 mg Oral Q6H Padmaja Ye MD   1,000 mg at 07/31/25 1157    alvimopan (ENTEREG) capsule 12 mg  12 mg Oral BID Padmaja Ye MD   12 mg at 07/31/25 0856    celecoxib (CeleBREX) capsule 200 mg  200 mg Oral Q12H Ephraim  Padmaja MASSEY MD   200 mg at 07/31/25 0856    cetirizine (zyrTEC) tablet 10 mg  10 mg Oral Daily Padmaja Ye MD   10 mg at 07/31/25 0856    enoxaparin sodium (LOVENOX) syringe 80 mg  1 mg/kg Subcutaneous Q12H Padmaja Ye MD   80 mg at 07/31/25 0857    escitalopram (LEXAPRO) tablet 10 mg  10 mg Oral Nightly Padmaja Ye MD   10 mg at 07/30/25 2039    famotidine (PEPCID) tablet 20 mg  20 mg Oral BID  Lurdes Tucker PA-C   20 mg at 07/31/25 0644    gabapentin (NEURONTIN) capsule 400 mg  400 mg Oral Q8H Padmaja Ye MD   400 mg at 07/31/25 1439    HYDROmorphone (DILAUDID) injection 0.25 mg  0.25 mg Intravenous Q3H PRN Padmaja Ye MD   0.25 mg at 07/31/25 0931    And    naloxone (NARCAN) injection 0.4 mg  0.4 mg Intravenous Q5 Min PRN Padmaja Ye MD        LORazepam (ATIVAN) tablet 0.5 mg  0.5 mg Oral Q8H PRN Padmaja Ye MD   0.5 mg at 07/31/25 1439    Methocarbamol (ROBAXIN) injection 500 mg  500 mg Intravenous Q8H PRN Padmaja Ye MD   500 mg at 07/31/25 0604    metoclopramide (REGLAN) tablet 5 mg  5 mg Oral TID Lurdes Soriano PA-C   5 mg at 07/31/25 1157    metoprolol succinate XL (TOPROL-XL) 24 hr tablet 12.5 mg  12.5 mg Oral Nightly Lurdes Tucker PA-C   12.5 mg at 07/30/25 2037    nitroglycerin (NITROSTAT) SL tablet 0.4 mg  0.4 mg Sublingual Q5 Min PRN Padmaja Ye MD        ondansetron (ZOFRAN) injection 4 mg  4 mg Intravenous Q6H PRN Padmaja Ye MD   4 mg at 07/31/25 0644    pantoprazole (PROTONIX) EC tablet 40 mg  40 mg Oral Q AM Padmaja Ye MD        Potassium Replacement - Follow Nurse / BPA Driven Protocol   Not Applicable PRN Padmaja Ye MD        scopolamine patch 1 mg/72 hr  1 patch Transdermal Continuous Padmaja Ye MD   1 patch at 07/29/25 0707     Lab Results (last 24 hours)       Procedure Component Value Units Date/Time    Tissue Pathology Exam [[id number]] Collected: 07/29/25 1102    Specimen: Tissue from Large Intestine, Left /  "Descending Colon Updated: 07/31/25 1517     Case Report --     Surgical Pathology Report                         Case: [id number]                                  Authorizing Provider:  Padmaja Ye MD        Collected:           07/29/2025 11:02 AM          Ordering Location:     [facility]  Received:            07/29/2025 12:59 PM                                 MAIN OR                                                                      Pathologist:           Jania Perez MD                                                          Specimen:    Large Intestine, Left / Descending Colon, DESCENDING & SIGMOID COLON                        Clinical Information --     DESCENDING & SIGMOID COLON       Final Diagnosis --     1. Descending and sigmoid colon, resection:   A. Portion of colon with grossly identifiable transmural defect.   B. Surgical margins of resection appear viable.       Gross Description --     1. Large Intestine, Left / Descending Colon.  Received in formalin labeled \"descending and sigmoid colon\" is an unoriented 11 cm segment of colon which is stapled with the ends.  The serosal surface is smooth to shaggy tan-red with minimal adherent adipose tissue.  There is a 0.3 cm transmural defect which has a suture within the defect, threaded through and not knotted.  The transmural defect comes to within 3 cm of the nearest mucosal margin.  The remaining mucosa is tan glistening with attenuated folds and the wall thickness ranges from 0.1 cm to 0.3 cm.  There are no discrete lesions or masses.  No lymph nodes are grossly identified.  Representative sections are submitted as follows:    1A- transmural defect  1B- mucosal margin nearest to the transmural defect, en face  1C- opposite mucosal margin en face  1D-1E- random mucosa    esthela/silvana/dina         Microscopic Description --     Unless \"gross only\" is specified, the final diagnosis for each specimen is based on microscopic examination " of tissue.      Basic Metabolic Panel [[id number]]  (Abnormal) Collected: 07/31/25 0335    Specimen: Blood Updated: 07/31/25 0433     Glucose 124 mg/dL      BUN 7.0 mg/dL      Creatinine 0.41 mg/dL      Sodium 142 mmol/L      Potassium 3.2 mmol/L      Chloride 109 mmol/L      CO2 26.4 mmol/L      Calcium 8.4 mg/dL      BUN/Creatinine Ratio 17.1     Anion Gap 6.6 mmol/L      eGFR 123.8 mL/min/1.73     Narrative:      GFR Categories in Chronic Kidney Disease (CKD)              GFR Category          GFR (mL/min/1.73)    Interpretation  G1                    90 or greater        Normal or high (1)  G2                    60-89                Mild decrease (1)  G3a                   45-59                Mild to moderate decrease  G3b                   30-44                Moderate to severe decrease  G4                    15-29                Severe decrease  G5                    14 or less           Kidney failure    (1)In the absence of evidence of kidney disease, neither GFR category G1 or G2 fulfill the criteria for CKD.    eGFR calculation 2021 CKD-EPI creatinine equation, which does not include race as a factor          ECG/EMG Results (last 24 hours)       Procedure Component Value Units Date/Time    Telemetry Scan [[id number]] Resulted: 07/30/25 1001     Updated: 07/30/25 1735    Telemetry Scan [[id number]] Resulted: 07/30/25 1600     Updated: 07/30/25 1736    Telemetry Scan [[id number]] Resulted: 07/30/25 2200     Updated: 07/31/25 0442    Telemetry Scan [[id number]] Resulted: 07/31/25 0406     Updated: 07/31/25 0442               Physician Progress Notes (last 24 hours)        Lurdes Tucker PA-C at 07/31/25 0814       Attestation signed by Padmaja Ye MD at 07/31/25 1026      I have reviewed this documentation and agree.                      Colorectal Surgery Progress Note    POD 2     S: positive BM. positive ambulating. Pain controlled with ERAS and occasional Dilaudid.  Tolerating regular diet. Urinating  without difficulty.    O:  Temp:  [98.2 °F (36.8 °C)-98.6 °F (37 °C)] 98.6 °F (37 °C)  Heart Rate:  [81-93] 83  Resp:  [16-18] 18  BP: (135-167)/(76-86) 167/86    Intake/Output Summary (Last 24 hours) at 7/31/2025 0814  Last data filed at 7/31/2025 0500  Gross per 24 hour   Intake 0 ml   Output 1150 ml   Net -1150 ml     Abd: soft, non-distended. Colostomy edematous, pink; 15 mL thin liquid stool in pouch that has not been emptied since 1 PM yesterday.   Incisions: clean, dry, intact, no erythema    Results from last 7 days   Lab Units 07/31/25  0335   SODIUM mmol/L 142   POTASSIUM mmol/L 3.2*   CHLORIDE mmol/L 109*   CO2 mmol/L 26.4   BUN mg/dL 7.0   CREATININE mg/dL 0.41*   EGFR mL/min/1.73 123.8   GLUCOSE mg/dL 124*   CALCIUM mg/dL 8.4*     Assessment & Plan   44 y.o. female s/p COLON RESECTION LOW ANTERIOR LAPAROSCOPIC WITH DAVINCI ROBOT, LYSIS OF ADHESIONS, END COLOSTOMY FORMATION  Replacing potassium.  To be rechecked this afternoon after replacement.  1 dose of MiraLAX ordered due to not much colostomy output since yesterday afternoon.  Also starting Reglan. Deescalate diet to clear liquids.  Will continue to monitor today. Continue mobilizing     Lurdes Tucker PA-C 07/31/25 08:14 EDT     Electronically signed by Padmaja Ye MD at 07/31/25 1026       Consult Notes (last 24 hours)  Notes from 07/30/25 1547 through 07/31/25 1547   No notes of this type exist for this encounter.

## 2025-07-31 NOTE — PLAN OF CARE
Goal Outcome Evaluation:  Plan of Care Reviewed With: patient        Progress: improving  Outcome Evaluation: Pt is a 44-year-old female with a history of rectal cancer, currently undergoing chemotherapy for metastatic disease. Pt found to have full incontinence of feces. Pt lives in a home w/ family, normally indep w/o the use of an AD. Pt received in bed AOx4, on RA, agreed to PT. Rn notified and approved. Pt was I for all functional mobility, not requiring an AD. Pt ambulated 120ft, steady on her feet. Pt doesnt require skilled PT services at the moment. Discharge recc to home.    Anticipated Discharge Disposition (PT): home

## 2025-07-31 NOTE — THERAPY EVALUATION
Patient Name: Carole Weaver  : 1980    MRN: [id number]                              Today's Date: 2025       Admit Date: 2025    Visit Dx:     ICD-10-CM ICD-9-CM   1. History of rectal cancer  Z85.048 V10.06   2. Full incontinence of feces  R15.9 787.60     Patient Active Problem List   Diagnosis    Anxiety    Irritable bowel syndrome    Monopolar depression    Adenocarcinoma of rectum    Family history of factor V Leiden mutation    Heterozygous factor V Leiden mutation    Encounter for fitting and adjustment of vascular catheter    Functional diarrhea    Adverse effect of antineoplastic and immunosuppressive drugs, subsequent encounter    Hypokalemia    Hypomagnesemia    Other pulmonary embolism without acute cor pulmonale    Hydronephrosis with ureteropelvic junction (UPJ) obstruction    Partial intestinal obstruction, unspecified as to cause    Irregular heart beat    Gastrointestinal hemorrhage, unspecified    Esophagitis    Family history of colonic polyps    Chronic diarrhea    Calculus of gallbladder    Benign neoplasm of transverse colon    Benign neoplasm of rectum and anal canal    Heart murmur    Anemia    Anticoagulation adequate    Iron deficiency    Adverse effect of iron    Drug-induced peripheral neuropathy    On antineoplastic chemotherapy    Chemotherapy-induced thrombocytopenia    HTN (hypertension)    Chronic anticoagulation    Chemotherapy-induced neutropenia    Hand foot syndrome    Secondary cancer of lung    Leukocytopenia    Thrombosis of superior vena cava    Tachycardia    Macrocytosis without anemia    Pulmonary embolism without acute cor pulmonale    Palmar-plantar erythrodysesthesia    On anticoagulant therapy    Lump of right breast    Kidney stones    HPV in female    History of radiation therapy    History of gestational diabetes    History of chemotherapy    History of anemia    Hemorrhoids    GERD (gastroesophageal reflux disease)    Fistula of intestine    Colon  polyps    Chemotherapy induced diarrhea    Cervical lymphadenitis    Bilateral epiphora    Family history of colon cancer    Partial small bowel obstruction    History of pulmonary embolism    Coronary artery calcification    Tobacco abuse    Encounter for long-term (current) use of high-risk medication    Dehydration    Mixed hyperlipidemia    History of rectal cancer    Intractable diarrhea    Diarrhea    Full incontinence of feces     Past Medical History:   Diagnosis Date    Abdominopelvic abscess 08/12/2020    ADMITTED TO [facility]    Abnormal Pap smear of cervix 02/02/1998    JULIUS I    Abscess of abdominal wall 11/28/2012    SEEN AT [facility] ER    Anemia in pregnancy     Anxiety     Bilateral epiphora 04/2020    Bilateral hand swelling 05/02/2020    SEEN AT [facility] ER    Cervical lymphadenitis 06/06/2012    SEEN AT [facility] ER    Chemotherapy induced diarrhea 01/2021    Chemotherapy induced neutropenia 04/2020    Chemotherapy-induced nausea 02/2020    Chemotherapy-induced thrombocytopenia 05/2020    Chronic anticoagulation     Chronic diarrhea     Colon polyps     FOLLOWED BY DR. 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 ALEXANDRU HUNT    History of gestational diabetes     History of pre-eclampsia 1998    History of pre-eclampsia     History of radiation therapy 2019    FOLLOWED BY DR. JAVON LEWIS    History of TB skin testing 01/17/2009    TB Skin Test     History of TB skin testing 2009    TB Skin Test    History of transfusion 2019    HPV in female 1998    Hypokalemia 2019    Hypomagnesemia 2019    Hyponatremia 2021    IBS (irritable bowel syndrome)     Ileostomy present 2020    Take down on 11/3/2022    Infected insect bite of neck 2016    SEEN AT [facility]    Kidney stone on right side 10/07/2019    Kidney stones 2007    SEEN AT [facility] ER    Low anterior resection syndrome 2022    FOLLOWED BY DR. GERONIMO ESPARZA    Lump of right breast     SWOLLEN LYMPH NODE    Lung cancer 2020    METASTATIC LUNG CANCER    Macrocytosis 2021    Monopolar depression     On anticoagulant therapy     Pain associated with surgical procedure 2020    Palmar-plantar erythrodysesthesia 2021    Perirectal abscess 2020    Perirectal abscess 2020    Added automatically from request for surgery [id number]      Pulmonary embolism without acute cor pulmonale 09/20/2019    X 3; ADMITTED TO [facility]    Pulmonary nodule, right 2020    Rectal cancer 2019    STAGE IIA, INVASIVE MODERATELY DIFFERENTIATED ADENOCARCINOMA, CHEMO AND XRT FINISHED 2019    Right shoulder pain 2020    SEEN AT [facility] ER    Right ureteral stone 10/01/2019    SEEN AT [facility] ER    Right ureteral stone 10/01/2019    SEEN AT [facility] ER      SAB (spontaneous ) 1996     A2-1 INDUCED    Sciatica     Sepsis due to cellulitis 2002    LEFT GREAT TOE, ADMITTED TO [facility]    Tachycardia 2020    Thrombosis of superior vena cava 2020    AND BRACHIOCEPHALIC VEIN, ADMITTED TO [facility]    Urinary urgency 2020     Past Surgical History:   Procedure Laterality Date    ABDOMINAL SURGERY      CHOLECYSTECTOMY N/A 10/10/2003    LAPAROSCOPIC WITH CHOLANGIOGRAM, DR. JAMEY TALAVERA AT [facility]    COLON RESECTION N/A 2019    Procedure: laparoscopic low anterior colon resection with mobilization of splenic flexure and diverting loop ileostomy: ERAS;   Surgeon: Padmaja Esparza MD;  Location: [facility] OR;  Service: General    COLON RESECTION N/A 08/03/2020    Procedure: LOW ANTERIOR COLON RESECTION, COLOANAL ANASTOMOSIS, MOBILIZATION SPLENIC FLEXURE;  Surgeon: Padmaja Esparza MD;  Location: [facility] OR;  Service: General;  Laterality: N/A;    COLON RESECTION N/A 7/29/2025    Procedure: COLON RESECTION LOW ANTERIOR LAPAROSCOPIC WITH DAVINCI ROBOT, LYSIS OF ADHESIONS, END COLOSTOMY FORMATION;  Surgeon: Padmaja Esparza MD;  Location: [facility] OR;  Service: Robotics - DaVinci;  Laterality: N/A;    COLONOSCOPY N/A 07/15/2020    PATENT ANASTAMOSIS IN MID RECTUM, RESCOPE IN 1 YR, DR. PADMAJA ESPARZA AT [facility]    COLONOSCOPY N/A 11/03/2022    DIFFUSE AREA OF MODERATELY FRIABLE MUCOSA IN ENTIRE COLON , DIVERSION COLITIS, PATENT AND HEALTHY ANASTAMOSIS, DR. PADMAJA ESPARZA AT [facility]    COLONOSCOPY N/A 12/04/2023    6 MM SESSILE SERRATED ADENOMA POLYP IN DESCENDING, PATENT ANASTAMOSIS IN DISTAL RECTUM, RESCOPE IN 2 YRS, DR. PADMAJA ESPARZA AT [facility]    COLONOSCOPY N/A 06/04/2025    PATENT AND HEALTHY ANASTAMOSIS IN DISTAL RECTUM, RESCOPE IN 2 YRS, DR. PADMAJA ESPARZA AT [facility]    COLONOSCOPY W/ POLYPECTOMY N/A 07/08/2019    15 MM TUBULOVILLOUS ADENOMA POLYP IN HEPATIC FLEXURE, 20 MMTUBULOVILLOUS ADENOMA WITH HIGH GRADE DYSPLASIA IN RECTOSIGMOID, 4 CM MASS IN MID RECTUM, PATH: INVASIVE MODERATELY DIFFERENTIATED ADENOCARCINOMA, DR. JENNIFER LI AT [facility]    DILATATION AND EVACUATION N/A 2009    ENDOSCOPY N/A 07/08/2019    LA GRADE A ESOPHAGITIS, GASTRITIS, ALL BIOPSIES BENIGN, DR. JENNIFER LI AT [facility]    ILEOSTOMY CLOSURE N/A 11/14/2022    Procedure: ILEOSTOMY TAKEDOWN;  Surgeon: Padmaja Esparza MD;  Location: [facility];  Service: General;  Laterality: N/A;    INCISION AND DRAINAGE PERIRECTAL ABSCESS N/A 08/14/2020    Procedure: INCISION AND DRAINAGE OF retrorectal dehiscence abcess with drain placement and irrigation;  Surgeon: Padmaja Esparza MD;   Location: [facility] MAIN OR;  Service: General;  Laterality: N/A;    PAP SMEAR N/A 01/24/2014    SIGMOIDOSCOPY N/A 07/24/2019    INFILTRATIVE PARTIALLY OBSTRUCTING LARGE RECTAL CANCER, AREA TATOOED, DR. GERONIMO ESPARZA AT [facility]    SIGMOIDOSCOPY N/A 11/23/2019    AN ULCERATED PARTIALLY OBSTRUCTING MASS IN MID RECTUM, AREA TATTOOED, DR. GERONIMO ESPARZA AT [facility]    SIGMOIDOSCOPY N/A 07/22/2021    PATENT ANASTAMOSIS IN DISTAL RECTUM, RESCOPE IN 1 YR, DR. GERONIMO ESPARZA AT [facility]    SIGMOIDOSCOPY N/A 09/11/2024    PATCHY INFLAMMATION AND EDEMA IN DESCENDING, AREA BIOPSIED AND WAS BENIGN, PATENT AND HEALTHY ANASTAMOSIS, HEMORRHOIDS,RESCOPE(CY) 12/2025, DR. GERONIMO ESPARZA AT [facility]    TRANSRECTAL ULTRASOUND N/A 07/24/2019    Procedure: ULTRASOUND TRANSRECTAL;  Surgeon: Geronimo Esparza MD;  Location: [facility] ENDOSCOPY;  Service: General    URETEROSCOPY LASER LITHOTRIPSY WITH STENT INSERTION Right 10/30/2019    DR. ESTUARDO BEASLEY AT [place]    VAGINAL DELIVERY N/A 09/18/1998    VENOUS ACCESS DEVICE (PORT) INSERTION Left 08/09/2019    Procedure: INSERTION VENOUS ACCESS DEVICE;  Surgeon: Sj Joseph MD;  Location: [facility];  Service: General    VENOUS ACCESS DEVICE (PORT) INSERTION N/A 12/18/2020    Procedure: INSERTION VENOUS ACCESS DEVICE right side, removal venous access device left side;  Surgeon: Sj Joseph MD;  Location:  [facility];  Service: General;  Laterality: N/A;    WISDOM TOOTH EXTRACTION Bilateral 1993      General Information       Row Name 07/31/25 0903          Physical Therapy Time and Intention    Document Type discharge evaluation/summary  -     Mode of Treatment individual therapy  -       Row Name 07/31/25 0903          General Information    Patient Profile Reviewed yes  -     Prior Level of Function independent:  -     Existing Precautions/Restrictions fall  -       Row Name 07/31/25 0903          Living Environment    Current Living Arrangements home  -     People in Home spouse  -        Row Name 07/31/25 0903          Cognition    Orientation Status (Cognition) oriented x 4  -               User Key  (r) = Recorded By, (t) = Taken By, (c) = Cosigned By      Initials Name Provider Type    Ant King, PT Physical Therapist                   Mobility       Row Name 07/31/25 0903          Bed Mobility    Bed Mobility bed mobility (all) activities  -     All Activities, [place] (Bed Mobility) independent  -       Row Name 07/31/25 0903          Sit-Stand Transfer    Sit-Stand [place] (Transfers) independent  -       Row Name 07/31/25 0903          Gait/Stairs (Locomotion)    [place] Level (Gait) independent  -     Patient was able to Ambulate yes  -     Distance in Feet (Gait) 120  -     Comment, (Gait/Stairs) Pt ambulated 120 ft w/o an AD, steady on her feet w/o any lob not observed gait deficits.  -               User Key  (r) = Recorded By, (t) = Taken By, (c) = Cosigned By      Initials Name Provider Type    Ant King, PT Physical Therapist                   Obj/Interventions       Row Name 07/31/25 0903          Range of Motion Comprehensive    General Range of Motion no range of motion deficits identified  -       Row Name 07/31/25 0903          Strength Comprehensive (MMT)    General Manual Muscle Testing (MMT) Assessment no strength deficits identified  -       Row Name 07/31/25 0903          Motor Skills    Motor Skills functional endurance  -     Functional Endurance good  -       Row Name 07/31/25 0903          Balance    Balance Assessment sitting static balance;sitting dynamic balance;standing static balance;standing dynamic balance  -     Static Sitting Balance independent  -     Dynamic Sitting Balance independent  -     Position, Sitting Balance unsupported  -     Static Standing Balance independent  -     Dynamic Standing Balance independent  -     Position/Device Used, Standing Balance supported  -     Balance  Interventions sitting;standing;sit to stand;supported;static;dynamic;minimal challenge  -     Comment, Balance good sitting and standing balance  -               User Key  (r) = Recorded By, (t) = Taken By, (c) = Cosigned By      Initials Name Provider Type    Ant King, PT Physical Therapist                   Goals/Plan    No documentation.                  Clinical Impression       Row Name 07/31/25 1119          Pain    Pretreatment Pain Rating 0/10 - no pain  -     Posttreatment Pain Rating 0/10 - no pain  -       Row Name 07/31/25 1119          Plan of Care Review    Plan of Care Reviewed With patient  -     Progress improving  -     Outcome Evaluation Pt is a 44-year-old female with a history of rectal cancer, currently undergoing chemotherapy for metastatic disease. Pt found to have full incontinence of feces. Pt lives in a home w/ family, normally indep w/o the use of an AD. Pt received in bed AOx4, on RA, agreed to PT. Rn notified and approved. Pt was I for all functional mobility, not requiring an AD. Pt ambulated 120ft, steady on her feet. Pt doesnt require skilled PT services at the moment. Discharge recc to home.  -       Row Name 07/31/25 1119          Therapy Assessment/Plan (PT)    Criteria for Skilled Interventions Met (PT) no  -     Therapy Frequency (PT) evaluation only  -       Row Name 07/31/25 1119          Positioning and Restraints    Pre-Treatment Position in bed  -     Post Treatment Position bed  -     In Bed notified nsg;supine;call light within reach;encouraged to call for assist;exit alarm on  -               User Key  (r) = Recorded By, (t) = Taken By, (c) = Cosigned By      Initials Name Provider Type    Ant King, PT Physical Therapist                   Outcome Measures       Row Name 07/31/25 0903          How much help from another person do you currently need...    Turning from your back to your side while in flat bed without using bedrails? 4   -     Moving from lying on back to sitting on the side of a flat bed without bedrails? 4  -     Moving to and from a bed to a chair (including a wheelchair)? 4  -     Standing up from a chair using your arms (e.g., wheelchair, bedside chair)? 4  -     Climbing 3-5 steps with a railing? 4  -     To walk in hospital room? 4  -     AM-PAC 6 Clicks Score (PT) 24  -     Highest Level of Mobility Goal Walk 250 Feet or More - 8  -       Row Name 07/31/25 0903          Functional Assessment    Outcome Measure Options AM-PAC 6 Clicks Basic Mobility (PT)  -               User Key  (r) = Recorded By, (t) = Taken By, (c) = Cosigned By      Initials Name Provider Type     Ant Negro, PT Physical Therapist                                 Physical Therapy Education       Title: PT OT SLP Therapies (Done)       Topic: Physical Therapy (Done)       Point: Mobility training (Done)       Learning Progress Summary            Patient Acceptance, E,D, VU by  at 7/31/2025 0903                      Point: Home exercise program (Done)       Learning Progress Summary            Patient Acceptance, E,D, VU by  at 7/31/2025 0903                      Point: Body mechanics (Done)       Learning Progress Summary            Patient Acceptance, E,D, VU by  at 7/31/2025 0903                      Point: Precautions (Done)       Learning Progress Summary            Patient Acceptance, E,D, VU by  at 7/31/2025 0903                                      User Key       Initials Effective Dates Name Provider Type Discipline     05/28/25 -  Ant Negro, PT Physical Therapist PT                  PT Recommendation and Plan     Progress: improving  Outcome Evaluation: Pt is a 44-year-old female with a history of rectal cancer, currently undergoing chemotherapy for metastatic disease. Pt found to have full incontinence of feces. Pt lives in a home w/ family, normally indep w/o the use of an AD. Pt received in bed AOx4, on RA,  agreed to PT. Rn notified and approved. Pt was I for all functional mobility, not requiring an AD. Pt ambulated 120ft, steady on her feet. Pt doesnt require skilled PT services at the moment. Discharge recc to home.     Time Calculation:   PT Evaluation Complexity  History, PT Evaluation Complexity: 3 or more personal factors and/or comorbidities  Examination of Body Systems (PT Eval Complexity): total of 3 or more elements  Clinical Presentation (PT Evaluation Complexity): evolving  Clinical Decision Making (PT Evaluation Complexity): moderate complexity  Overall Complexity (PT Evaluation Complexity): moderate complexity     PT Charges       Row Name 07/31/25 0903             Time Calculation    Start Time 0903  -      Stop Time 0912  -      Time Calculation (min) 9 min  -      PT Received On 07/31/25  -                User Key  (r) = Recorded By, (t) = Taken By, (c) = Cosigned By      Initials Name Provider Type     Ant Negro, PT Physical Therapist                  Therapy Charges for Today       Code Description Service Date Service Provider Modifiers Qty    17087277572 HC PT EVAL MOD COMPLEXITY 2 7/31/2025 Ant Negro, PT GP 1            PT G-Codes  Outcome Measure Options: AM-PAC 6 Clicks Basic Mobility (PT)  AM-PAC 6 Clicks Score (PT): 24  PT Discharge Summary  Anticipated Discharge Disposition (PT): home    Ant Negro PT  7/31/2025

## 2025-08-01 ENCOUNTER — READMISSION MANAGEMENT (OUTPATIENT)
Dept: CALL CENTER | Facility: HOSPITAL | Age: 45
End: 2025-08-01
Payer: COMMERCIAL

## 2025-08-01 VITALS
HEART RATE: 94 BPM | HEIGHT: 66 IN | RESPIRATION RATE: 18 BRPM | OXYGEN SATURATION: 98 % | DIASTOLIC BLOOD PRESSURE: 80 MMHG | WEIGHT: 183.64 LBS | TEMPERATURE: 98.6 F | SYSTOLIC BLOOD PRESSURE: 150 MMHG | BODY MASS INDEX: 29.51 KG/M2

## 2025-08-01 PROBLEM — R15.9 FULL INCONTINENCE OF FECES: Status: RESOLVED | Noted: 2025-07-18 | Resolved: 2025-08-01

## 2025-08-01 LAB
BILIRUB UR QL STRIP: NEGATIVE
CLARITY UR: CLEAR
COLOR UR: YELLOW
GLUCOSE UR STRIP-MCNC: NEGATIVE MG/DL
HGB UR QL STRIP.AUTO: NEGATIVE
KETONES UR QL STRIP: NEGATIVE
LEUKOCYTE ESTERASE UR QL STRIP.AUTO: NEGATIVE
NITRITE UR QL STRIP: NEGATIVE
PH UR STRIP.AUTO: 7.5 [PH] (ref 5–8)
POTASSIUM SERPL-SCNC: 4.4 MMOL/L (ref 3.5–5.2)
PROT UR QL STRIP: NEGATIVE
SP GR UR STRIP: 1.02 (ref 1–1.03)
UROBILINOGEN UR QL STRIP: NORMAL

## 2025-08-01 PROCEDURE — 25010000002 METHOCARBAMOL 1000 MG/10ML SOLUTION: Performed by: COLON & RECTAL SURGERY

## 2025-08-01 PROCEDURE — 84132 ASSAY OF SERUM POTASSIUM: CPT | Performed by: COLON & RECTAL SURGERY

## 2025-08-01 PROCEDURE — 25010000002 ENOXAPARIN PER 10 MG: Performed by: COLON & RECTAL SURGERY

## 2025-08-01 PROCEDURE — 25010000002 HYDROMORPHONE PER 4 MG: Performed by: COLON & RECTAL SURGERY

## 2025-08-01 PROCEDURE — 99024 POSTOP FOLLOW-UP VISIT: CPT | Performed by: PHYSICIAN ASSISTANT

## 2025-08-01 PROCEDURE — 81003 URINALYSIS AUTO W/O SCOPE: CPT | Performed by: PHYSICIAN ASSISTANT

## 2025-08-01 RX ORDER — METHOCARBAMOL 500 MG/1
500 TABLET, FILM COATED ORAL 3 TIMES DAILY PRN
Qty: 36 TABLET | Refills: 0 | Status: SHIPPED | OUTPATIENT
Start: 2025-08-01 | End: 2025-08-15

## 2025-08-01 RX ORDER — POLYETHYLENE GLYCOL 3350 17 G/17G
17 POWDER, FOR SOLUTION ORAL DAILY
Start: 2025-08-01

## 2025-08-01 RX ORDER — ENOXAPARIN SODIUM 100 MG/ML
80 INJECTION SUBCUTANEOUS EVERY 12 HOURS SCHEDULED
Qty: 48 ML | Refills: 0 | Status: SHIPPED | OUTPATIENT
Start: 2025-08-01 | End: 2025-08-31

## 2025-08-01 RX ORDER — GABAPENTIN 300 MG/1
300 CAPSULE ORAL 3 TIMES DAILY
Qty: 42 CAPSULE | Refills: 0 | Status: SHIPPED | OUTPATIENT
Start: 2025-08-01 | End: 2025-08-15

## 2025-08-01 RX ADMIN — CETIRIZINE HYDROCHLORIDE 10 MG: 10 TABLET, FILM COATED ORAL at 08:42

## 2025-08-01 RX ADMIN — METOCLOPRAMIDE 5 MG: 5 TABLET ORAL at 11:58

## 2025-08-01 RX ADMIN — CELECOXIB 200 MG: 200 CAPSULE ORAL at 08:41

## 2025-08-01 RX ADMIN — ALVIMOPAN 12 MG: 12 CAPSULE ORAL at 08:41

## 2025-08-01 RX ADMIN — FAMOTIDINE 20 MG: 20 TABLET, FILM COATED ORAL at 08:41

## 2025-08-01 RX ADMIN — GABAPENTIN 400 MG: 400 CAPSULE ORAL at 06:28

## 2025-08-01 RX ADMIN — ACETAMINOPHEN 1000 MG: 500 TABLET, FILM COATED ORAL at 06:27

## 2025-08-01 RX ADMIN — POTASSIUM CHLORIDE 40 MEQ: 1500 TABLET, EXTENDED RELEASE ORAL at 04:25

## 2025-08-01 RX ADMIN — ENOXAPARIN SODIUM 80 MG: 100 INJECTION SUBCUTANEOUS at 08:41

## 2025-08-01 RX ADMIN — METHOCARBAMOL 500 MG: 1000 INJECTION, SOLUTION INTRAMUSCULAR; INTRAVENOUS at 15:37

## 2025-08-01 RX ADMIN — LORAZEPAM 0.5 MG: 0.5 TABLET ORAL at 08:59

## 2025-08-01 RX ADMIN — METOCLOPRAMIDE 5 MG: 5 TABLET ORAL at 08:41

## 2025-08-01 RX ADMIN — HYDROMORPHONE HYDROCHLORIDE 0.25 MG: 1 INJECTION, SOLUTION INTRAMUSCULAR; INTRAVENOUS; SUBCUTANEOUS at 04:24

## 2025-08-01 RX ADMIN — ACETAMINOPHEN 1000 MG: 500 TABLET, FILM COATED ORAL at 11:57

## 2025-08-01 RX ADMIN — FAMOTIDINE 20 MG: 20 TABLET, FILM COATED ORAL at 17:25

## 2025-08-01 RX ADMIN — METOCLOPRAMIDE 5 MG: 5 TABLET ORAL at 17:25

## 2025-08-01 RX ADMIN — ACETAMINOPHEN 1000 MG: 500 TABLET, FILM COATED ORAL at 17:25

## 2025-08-01 RX ADMIN — HYDROMORPHONE HYDROCHLORIDE 0.25 MG: 1 INJECTION, SOLUTION INTRAMUSCULAR; INTRAVENOUS; SUBCUTANEOUS at 12:02

## 2025-08-01 NOTE — NURSING NOTE
"   08/01/25 0856   Colostomy LLQ   Placement date: If unknown, DO NOT use \"Add Comment\" note: 07/29/25   Inserted by: Dr. Ye  Location: LLQ   Stomal Appliance 2 piece;Clean;Dry;Intact;Drainable   Stoma Appearance round;rosebud appearance;moist;swollen;red;protruding above skin level   Peristomal Assessment ELEANOR   Stoma Function flatus;stool   Stool Color brown, light   Stool Consistency liquid   Treatment Placement checked     OMS: Follow up on ostomy care. Existing ostomy appliance remains intact, good seal noted, no leaks, Dariel 2-piece 2 3/4 appliance in place.  Patient stated that she is independent in ostomy care since she previously had one.  She has supplies in the room.  She doesn't want to change it now; she'll probably do it tomorrow.  Please re-consult for any additional needs.  "

## 2025-08-01 NOTE — PLAN OF CARE
Problem: Adult Inpatient Plan of Care  Goal: Plan of Care Review  Outcome: Progressing   Goal Outcome Evaluation:      Vss.Remains on clear liquid diet.Stoma remains swollen, having brown liquid output with red streaks at times.UA sent.Prn ativan, dilaudid, and robaxin given as ordered for c/o pain/muscle spasms.Pt ambulated in diggs several times throughout the day independently.No c/o n/v.

## 2025-08-01 NOTE — PROGRESS NOTES
CRS attending  Patient tolerating clears  She does not have a big appetite  Afebrile vital signs are stable    Abdomen is soft  Ostomy is swollen and red  She is having some stool output    Okay for discharge  Will follow-up on Wednesday in the office

## 2025-08-01 NOTE — PLAN OF CARE
Goal Outcome Evaluation:  Plan of Care Reviewed With: patient           Outcome Evaluation: Ostomy stoma appears swollen. Ouput watery red with some floaters minimal amount. Flatus present. Constant abdominal pain throughout. Managed with ERAs and prn pain meds.

## 2025-08-01 NOTE — PROGRESS NOTES
Colorectal Surgery Progress Note    POD 3      S: 100 ml stool output yesterday day shift, and 100 ml overnight. positive ambulating. Denies parastomal pain; has pain only at incision sites. Tolerating small amounts of clear liquids. Pt wants to go home as soon as possible.  Having rectal discharge that she is unaware of passing; also having some stopping and starting of urine stream as well as frequency.    O:  Temp:  [98.1 °F (36.7 °C)-99.1 °F (37.3 °C)] 99.1 °F (37.3 °C)  Heart Rate:  [84-95] 95  Resp:  [16-18] 18  BP: (117-154)/(76-81) 143/81    Intake/Output Summary (Last 24 hours) at 8/1/2025 0858  Last data filed at 8/1/2025 0627  Gross per 24 hour   Intake 600 ml   Output 1050 ml   Net -450 ml     Abd: soft, non-distended. Stoma darker in color, still edematous, liquid stool in pouch.  Incisions: clean, dry, intact, no erythema    A/P: 44 y.o. female s/p COLON RESECTION LOW ANTERIOR LAPAROSCOPIC WITH DAVINCI ROBOT, LYSIS OF ADHESIONS, END COLOSTOMY FORMATION,  Potassium has normalized this morning  Urinalysis to assess dysuria  Continue clear liquids. Will monitor stoma and output today, and Dr. Ye will see her this afternoon.      Lurdes Tucker PA-C 08/01/25 08:58 EDT

## 2025-08-01 NOTE — CASE MANAGEMENT/SOCIAL WORK
Case Management Discharge Note      Final Note: Home with family. Denies need for HH. Family transport    Provided Post Acute Provider List?: N/A  Provided Post Acute Provider Quality & Resource List?: N/A    Selected Continued Care - Admitted Since 7/29/2025       Destination    No services have been selected for the patient.                Durable Medical Equipment    No services have been selected for the patient.                Dialysis/Infusion    No services have been selected for the patient.                Home Medical Care    No services have been selected for the patient.                Therapy    No services have been selected for the patient.                Community Resources    No services have been selected for the patient.                Community & DME    No services have been selected for the patient.                    Transportation Services  Transportation: Private Transportation  Private: Car    Final Discharge Disposition Code: 01 - home or self-care

## 2025-08-01 NOTE — DISCHARGE SUMMARY
Date of Admission: 7/29/2025  Date of Discharge:  8/1/2025    Presenting Problem/History of Present Illness  History of rectal cancer [Z85.048]  Full incontinence of feces [R15.9]     Discharge Diagnosis:   Past Medical History:   Diagnosis Date    Abdominopelvic abscess 08/12/2020    ADMITTED TO [facility]    Abnormal Pap smear of cervix 02/02/1998    JULIUS I    Abscess of abdominal wall 11/28/2012    SEEN AT [facility] ER    Anemia in pregnancy     Anxiety     Bilateral epiphora 04/2020    Bilateral hand swelling 05/02/2020    SEEN AT [facility] ER    Cervical lymphadenitis 06/06/2012    SEEN AT [facility] ER    Chemotherapy induced diarrhea 01/2021    Chemotherapy induced neutropenia 04/2020    Chemotherapy-induced nausea 02/2020    Chemotherapy-induced thrombocytopenia 05/2020    Chronic anticoagulation     Chronic diarrhea     Colon polyps     FOLLOWED BY DR. 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 ALEXANDRU HUNT    History of gestational diabetes     History of pre-eclampsia 1998    History of pre-eclampsia     History of radiation therapy 2019    FOLLOWED BY DR. JAVON LEWIS    History of TB skin testing 01/17/2009    TB Skin Test    History of TB skin testing 01/17/2009    TB Skin Test    History of transfusion 2019    12/2019    HPV in female 1998    Hypokalemia 09/2019    Hypomagnesemia 09/2019    Hyponatremia 06/2021    IBS (irritable bowel syndrome)      Ileostomy present 2020    Take down on 11/3/2022    Infected insect bite of neck 2016    SEEN AT [facility]    Kidney stone on right side 10/07/2019    Kidney stones 2007    SEEN AT [facility] ER    Low anterior resection syndrome 2022    FOLLOWED BY DR. GERONIMO ESPARZA    Lump of right breast     SWOLLEN LYMPH NODE    Lung cancer 2020    METASTATIC LUNG CANCER    Macrocytosis 2021    Monopolar depression     On anticoagulant therapy     Pain associated with surgical procedure 2020    Palmar-plantar erythrodysesthesia 2021    Perirectal abscess 2020    Perirectal abscess 2020    Added automatically from request for surgery [id number]      Pulmonary embolism without acute cor pulmonale 09/20/2019    X 3; ADMITTED TO [facility]    Pulmonary nodule, right 2020    Rectal cancer 2019    STAGE IIA, INVASIVE MODERATELY DIFFERENTIATED ADENOCARCINOMA, CHEMO AND XRT FINISHED 2019    Right shoulder pain 2020    SEEN AT [facility] ER    Right ureteral stone 10/01/2019    SEEN AT [facility] ER    Right ureteral stone 10/01/2019    SEEN AT [facility] ER      SAB (spontaneous ) 1996     A2-1 INDUCED    Sciatica     Sepsis due to cellulitis 2002    LEFT GREAT TOE, ADMITTED TO [facility]    Tachycardia 2020    Thrombosis of superior vena cava 2020    AND BRACHIOCEPHALIC VEIN, ADMITTED TO [facility]    Urinary urgency 2020       Procedures Performed  Procedure(s):  COLON RESECTION LOW ANTERIOR LAPAROSCOPIC WITH DAVINCI ROBOT, LYSIS OF ADHESIONS, END COLOSTOMY FORMATION      Hospital Course  Patient is a 44 y.o. female who presented with the above diagnosis and underwent the above procedure. The patient's diet was advanced from clears to regular.  The Braswell was removed and the patient voided without difficulty. She had a decrease in colostomy output associated with edema of the stoma, and her diet was de-escalated to clear liquids.  She was deemed appropriate for discharge  "home.    Consults:   Consults       Date and Time Order Name Status Description    7/29/2025  6:09 AM Inpatient Anesthesiology Consult      7/21/2025  8:48 AM Inpatient Infectious Diseases Consult Completed     7/21/2025  5:18 AM Inpatient Colorectal Surgery Consult Completed     7/21/2025  5:18 AM Hematology & Oncology Inpatient Consult Completed               Discharge Disposition  Home or Self Care    Discharge Medications     Discharge Medications        PAUSE taking these medications        Instructions Start Date   dicyclomine 20 MG tablet  Wait to take this until your doctor or other care provider tells you to start again.  Commonly known as: BENTYL   TAKE 1 TABLET BY MOUTH EVERY 6 (SIX) HOURS AS NEEDED FOR IRRITABLE BOWEL SYMPTOMS             New Medications        Instructions Start Date   gabapentin 300 MG capsule  Commonly known as: Neurontin   300 mg, Oral, 3 Times Daily      methocarbamol 500 MG tablet  Commonly known as: ROBAXIN   500 mg, Oral, 3 Times Daily PRN      polyethylene glycol 17 g packet  Commonly known as: MIRALAX   17 g, Oral, Daily             Changes to Medications        Instructions Start Date   enoxaparin sodium 100 MG/ML solution prefilled syringe syringe  Commonly known as: LOVENOX  What changed: how much to take   80 mg, Subcutaneous, Every 12 Hours Scheduled      escitalopram 10 MG tablet  Commonly known as: LEXAPRO  What changed: when to take this   10 mg, Oral, Daily      famotidine 20 MG tablet  Commonly known as: PEPCID  What changed: when to take this   TAKE 1 TABLET BY MOUTH TWICE A DAY             Continue These Medications        Instructions Start Date   BD SafetyGlide Syringe/Needle 27G X 5/8\" 1 ML misc  Generic drug: Syringe/Needle (Disp)   1 mL, Not Applicable, 3 Times Daily PRN      clonazePAM 1 MG tablet  Commonly known as: KlonoPIN   TAKE 1 TABLET AS NEEDED DAILY FOR ANXIETY. MAY TAKE ONE ADDITIONAL AS NEEDED FOR SEVERE ANXIETY.      HYDROcodone-acetaminophen 5-325 " MG per tablet  Commonly known as: NORCO   2 tablets, Oral, Every 6 Hours PRN      Loratadine 10 MG capsule   10 mg, Every Evening      metoprolol succinate XL 25 MG 24 hr tablet  Commonly known as: TOPROL-XL   12.5 mg, Oral, Nightly      nystatin-zinc oxide-hydrocortisone-bacitracin   Apply topically to the appropriate area as directed Daily As Needed.      ondansetron 8 MG tablet  Commonly known as: ZOFRAN   TAKE 1 TABLET BY MOUTH THREE TIMES A DAY AS NEEDED FOR NAUSEA AND VOMITING      VITAMIN B-12 PO   1 tablet, 3 Times Weekly             Stop These Medications      IMODIUM PO     octreotide 100 MCG/ML injection  Commonly known as: sandoSTATIN     pantoprazole 40 MG EC tablet  Commonly known as: PROTONIX              Activity at Discharge:   Activity Instructions       Driving Restrictions      Type of Restriction: Driving    Driving Restrictions: No Driving Until Next Appointment    Gradually Increase Activity Until at Pre-Hospitalization Level      Lifting Restrictions      Type of Restriction: Lifting    Lifting Restrictions: Lifting Restriction (Indicate Limit)    Weight Limit (Pounds): 15    Length of Lifting Restriction: until office appt            Follow-up Appointments  Future Appointments   Date Time Provider Department Center   8/11/2025 10:30 AM Charissa Messina PA-C [facility]  [facility]   8/15/2025  9:15 AM INFUSION ACCESS CHAIR- [facility]  [facility]   8/15/2025 10:30 AM TRE[facility] CT 1  [facility] CT E [facility]   8/22/2025 10:15 AM INFUSION ACCESS CHAIR- [facility]  [facility]   8/22/2025 10:40 AM Dong Nguyen MD PhD [facility]   8/22/2025 11:15 AM CHAIR 16 [facility]     Additional Instructions for the Follow-ups that You Need to Schedule       Call MD With Problems / Concerns   As directed      Instructions: temp >101. Drainage from wound. vomitting.    Order Comments: Instructions: temp >101. Drainage from wound. vomitting.         Discharge Follow-up with Specified Provider:  Ephraim; 2 Weeks   As directed      To: Ephraim   Follow Up: 2 Weeks

## 2025-08-01 NOTE — OUTREACH NOTE
Prep Survey      Flowsheet Row Responses   [facility] patient discharged from? [facility]   Is LACE score < 7 ? No   Eligibility [facility]   Date of Admission 07/29/25   Date of Discharge 08/01/25   Discharge Disposition Home or Self Care   Discharge diagnosis Full incontinence of feces, COLON RESECTION LOW ANTERIOR LAPAROSCOPIC WITH DAVINCI ROBOT, LYSIS OF ADHESIONS, END COLOSTOMY FORMATION   Does the patient have one of the following disease processes/diagnoses(primary or secondary)? General Surgery   Does the patient have Home health ordered? No   Is there a DME ordered? No   Prep survey completed? Yes            Nikole JASMINE - Registered Nurse

## 2025-08-02 ENCOUNTER — APPOINTMENT (OUTPATIENT)
Dept: CT IMAGING | Facility: HOSPITAL | Age: 45
End: 2025-08-02
Payer: COMMERCIAL

## 2025-08-02 ENCOUNTER — HOSPITAL ENCOUNTER (EMERGENCY)
Facility: HOSPITAL | Age: 45
Discharge: HOME | End: 2025-08-02
Attending: EMERGENCY MEDICINE
Payer: COMMERCIAL

## 2025-08-02 VITALS
DIASTOLIC BLOOD PRESSURE: 75 MMHG | BODY MASS INDEX: 29.51 KG/M2 | WEIGHT: 183.64 LBS | HEIGHT: 66 IN | RESPIRATION RATE: 18 BRPM | OXYGEN SATURATION: 100 % | HEART RATE: 114 BPM | SYSTOLIC BLOOD PRESSURE: 146 MMHG | TEMPERATURE: 98.4 F

## 2025-08-02 DIAGNOSIS — R10.9 POSTOPERATIVE ABDOMINAL PAIN: Primary | ICD-10-CM

## 2025-08-02 DIAGNOSIS — G89.18 POSTOPERATIVE ABDOMINAL PAIN: Primary | ICD-10-CM

## 2025-08-02 DIAGNOSIS — K91.89 POSTOPERATIVE ILEUS: ICD-10-CM

## 2025-08-02 DIAGNOSIS — K56.7 POSTOPERATIVE ILEUS: ICD-10-CM

## 2025-08-02 LAB
ALBUMIN SERPL-MCNC: 3.5 G/DL (ref 3.5–5.2)
ALBUMIN/GLOB SERPL: 1.1 G/DL
ALP SERPL-CCNC: 102 U/L (ref 39–117)
ALT SERPL W P-5'-P-CCNC: 13 U/L (ref 1–33)
ANION GAP SERPL CALCULATED.3IONS-SCNC: 13 MMOL/L (ref 5–15)
AST SERPL-CCNC: 15 U/L (ref 1–32)
BASOPHILS # BLD AUTO: 0.01 10*3/MM3 (ref 0–0.2)
BASOPHILS NFR BLD AUTO: 0.1 % (ref 0–1.5)
BILIRUB SERPL-MCNC: 0.6 MG/DL (ref 0–1.2)
BUN SERPL-MCNC: 7 MG/DL (ref 6–20)
BUN/CREAT SERPL: 12.1 (ref 7–25)
CALCIUM SPEC-SCNC: 9.1 MG/DL (ref 8.6–10.5)
CHLORIDE SERPL-SCNC: 103 MMOL/L (ref 98–107)
CO2 SERPL-SCNC: 21 MMOL/L (ref 22–29)
CREAT SERPL-MCNC: 0.58 MG/DL (ref 0.57–1)
D-LACTATE SERPL-SCNC: 1.2 MMOL/L (ref 0.5–2)
DEPRECATED RDW RBC AUTO: 43.4 FL (ref 37–54)
EGFRCR SERPLBLD CKD-EPI 2021: 113.2 ML/MIN/1.73
EOSINOPHIL # BLD AUTO: 0.17 10*3/MM3 (ref 0–0.4)
EOSINOPHIL NFR BLD AUTO: 1.7 % (ref 0.3–6.2)
ERYTHROCYTE [DISTWIDTH] IN BLOOD BY AUTOMATED COUNT: 12.8 % (ref 12.3–15.4)
GLOBULIN UR ELPH-MCNC: 3.3 GM/DL
GLUCOSE SERPL-MCNC: 90 MG/DL (ref 65–99)
HCT VFR BLD AUTO: 37.1 % (ref 34–46.6)
HGB BLD-MCNC: 12.5 G/DL (ref 12–15.9)
HOLD SPECIMEN: NORMAL
IMM GRANULOCYTES # BLD AUTO: 0.04 10*3/MM3 (ref 0–0.05)
IMM GRANULOCYTES NFR BLD AUTO: 0.4 % (ref 0–0.5)
LIPASE SERPL-CCNC: 33 U/L (ref 13–60)
LYMPHOCYTES # BLD AUTO: 0.74 10*3/MM3 (ref 0.7–3.1)
LYMPHOCYTES NFR BLD AUTO: 7.2 % (ref 19.6–45.3)
MCH RBC QN AUTO: 32 PG (ref 26.6–33)
MCHC RBC AUTO-ENTMCNC: 33.7 G/DL (ref 31.5–35.7)
MCV RBC AUTO: 94.9 FL (ref 79–97)
MONOCYTES # BLD AUTO: 0.76 10*3/MM3 (ref 0.1–0.9)
MONOCYTES NFR BLD AUTO: 7.4 % (ref 5–12)
NEUTROPHILS NFR BLD AUTO: 8.51 10*3/MM3 (ref 1.7–7)
NEUTROPHILS NFR BLD AUTO: 83.2 % (ref 42.7–76)
NRBC BLD AUTO-RTO: 0 /100 WBC (ref 0–0.2)
PLATELET # BLD AUTO: 200 10*3/MM3 (ref 140–450)
PMV BLD AUTO: 9.5 FL (ref 6–12)
POTASSIUM SERPL-SCNC: 4.1 MMOL/L (ref 3.5–5.2)
PROT SERPL-MCNC: 6.8 G/DL (ref 6–8.5)
RBC # BLD AUTO: 3.91 10*6/MM3 (ref 3.77–5.28)
SODIUM SERPL-SCNC: 137 MMOL/L (ref 136–145)
WBC NRBC COR # BLD AUTO: 10.23 10*3/MM3 (ref 3.4–10.8)
WHOLE BLOOD HOLD COAG: NORMAL

## 2025-08-02 PROCEDURE — 85025 COMPLETE CBC W/AUTO DIFF WBC: CPT | Performed by: EMERGENCY MEDICINE

## 2025-08-02 PROCEDURE — 99285 EMERGENCY DEPT VISIT HI MDM: CPT

## 2025-08-02 PROCEDURE — 25010000002 MORPHINE PER 10 MG: Performed by: EMERGENCY MEDICINE

## 2025-08-02 PROCEDURE — 83605 ASSAY OF LACTIC ACID: CPT | Performed by: EMERGENCY MEDICINE

## 2025-08-02 PROCEDURE — 96375 TX/PRO/DX INJ NEW DRUG ADDON: CPT

## 2025-08-02 PROCEDURE — 96374 THER/PROPH/DIAG INJ IV PUSH: CPT

## 2025-08-02 PROCEDURE — 25810000003 LACTATED RINGERS SOLUTION: Performed by: EMERGENCY MEDICINE

## 2025-08-02 PROCEDURE — 83690 ASSAY OF LIPASE: CPT | Performed by: EMERGENCY MEDICINE

## 2025-08-02 PROCEDURE — 74177 CT ABD & PELVIS W/CONTRAST: CPT

## 2025-08-02 PROCEDURE — 25010000002 ONDANSETRON PER 1 MG: Performed by: EMERGENCY MEDICINE

## 2025-08-02 PROCEDURE — 25510000001 IOPAMIDOL 61 % SOLUTION: Performed by: EMERGENCY MEDICINE

## 2025-08-02 PROCEDURE — 80053 COMPREHEN METABOLIC PANEL: CPT | Performed by: EMERGENCY MEDICINE

## 2025-08-02 RX ORDER — ONDANSETRON 2 MG/ML
4 INJECTION INTRAMUSCULAR; INTRAVENOUS ONCE
Status: COMPLETED | OUTPATIENT
Start: 2025-08-02 | End: 2025-08-02

## 2025-08-02 RX ORDER — SODIUM CHLORIDE 0.9 % (FLUSH) 0.9 %
10 SYRINGE (ML) INJECTION AS NEEDED
Status: DISCONTINUED | OUTPATIENT
Start: 2025-08-02 | End: 2025-08-02 | Stop reason: HOSPADM

## 2025-08-02 RX ORDER — IOPAMIDOL 612 MG/ML
85 INJECTION, SOLUTION INTRAVASCULAR
Status: COMPLETED | OUTPATIENT
Start: 2025-08-02 | End: 2025-08-02

## 2025-08-02 RX ORDER — MORPHINE SULFATE 2 MG/ML
4 INJECTION, SOLUTION INTRAMUSCULAR; INTRAVENOUS ONCE
Status: COMPLETED | OUTPATIENT
Start: 2025-08-02 | End: 2025-08-02

## 2025-08-02 RX ADMIN — SODIUM CHLORIDE, POTASSIUM CHLORIDE, SODIUM LACTATE AND CALCIUM CHLORIDE 1000 ML: 600; 310; 30; 20 INJECTION, SOLUTION INTRAVENOUS at 17:29

## 2025-08-02 RX ADMIN — MORPHINE SULFATE 4 MG: 2 INJECTION, SOLUTION INTRAMUSCULAR; INTRAVENOUS at 17:27

## 2025-08-02 RX ADMIN — ONDANSETRON 4 MG: 2 INJECTION, SOLUTION INTRAMUSCULAR; INTRAVENOUS at 17:25

## 2025-08-02 RX ADMIN — IOPAMIDOL 85 ML: 612 INJECTION, SOLUTION INTRAVENOUS at 17:40

## 2025-08-02 NOTE — DISCHARGE INSTRUCTIONS
Continue taking your normal pain medications.  Return to the emergency department for worsening pain, vomiting, fever, chills, or other concern.  Follow-up with Dr. Ye soon as possible if symptoms persist.

## 2025-08-02 NOTE — ED TRIAGE NOTES
Pt s/p colon surgery last week, decreased output on ostomy, pain not being controlled by meds, fever 101.6

## 2025-08-02 NOTE — ED PROVIDER NOTES
" EMERGENCY DEPARTMENT ENCOUNTER    Room Number:  31/31  PCP: Deepika Vela III, NP-C  Historian: Patient    I initially evaluated the patient at 1650    HPI:  Chief Complaint: Lower abdominal pain, fever  A complete HPI/ROS/PMH/PSH/SH/FH are unobtainable due to: Nothing  Context: Carole Weaver is a 45 y.o. female with a medical history of rectal cancer who presents to the ED c/o acute lower abdominal pain and fever.  Patient had a low anterior laparoscopic colon resection, adhesiolysis, and and colostomy formation performed by Dr. Ye on 7/29/2025.  She was discharged home yesterday.  She had decreased ostomy output over the past few days.  However this morning, patient had increased ostomy output.  She had to empty her ostomy bag 4 times over the course of 2 hours.  Then at around noon, she began having increasing abdominal pain.  Pain \"comes in waves\" and is sharp and severe.  She took Robaxin, Neurontin, and Norco with minimal relief.  Nothing made the pain worse.  She had a chills and a temperature of 101.6 around 3 PM.  She currently complains of nausea and lower abdominal discomfort.  She has had increased urinary frequency for several days. Urinalysis done yesterday prior to discharge was normal.  She denies shortness of breath, chest pain, or vomiting.  Last chemotherapy was in early July.  Temperature was 98.4 at triage (using the triage thermometer).  Patient then checked her temperature with her home thermometer and it was 100.6.            PAST MEDICAL HISTORY  Active Ambulatory Problems     Diagnosis Date Noted    Anxiety 08/09/2016    Irritable bowel syndrome 08/09/2016    Monopolar depression 05/09/2019    Adenocarcinoma of rectum 07/12/2019    Family history of factor V Leiden mutation 08/01/2019    Heterozygous factor V Leiden mutation 08/02/2019    Encounter for fitting and adjustment of vascular catheter 08/07/2019    Functional diarrhea 09/03/2019    Adverse effect of " antineoplastic and immunosuppressive drugs, subsequent encounter 09/03/2019    Hypokalemia 09/03/2019    Hypomagnesemia 09/03/2019    Other pulmonary embolism without acute cor pulmonale 09/20/2019    Hydronephrosis with ureteropelvic junction (UPJ) obstruction 10/15/2019    Partial intestinal obstruction, unspecified as to cause 07/08/2019    Irregular heart beat 12/10/2019    Gastrointestinal hemorrhage, unspecified 07/08/2019    Esophagitis 07/08/2019    Family history of colonic polyps 12/10/2019    Chronic diarrhea 12/10/2019    Calculus of gallbladder 12/10/2019    Benign neoplasm of transverse colon 07/08/2019    Benign neoplasm of rectum and anal canal 07/08/2019    Heart murmur 12/10/2019    Anemia 01/22/2020    Anticoagulation adequate 01/22/2020    Iron deficiency 02/05/2020    Adverse effect of iron 02/05/2020    Drug-induced peripheral neuropathy 04/15/2020    On antineoplastic chemotherapy 04/25/2020    Chemotherapy-induced thrombocytopenia 04/25/2020    HTN (hypertension) 04/25/2020    Chronic anticoagulation 04/25/2020    Chemotherapy-induced neutropenia 04/29/2020    Hand foot syndrome 05/13/2020    Secondary cancer of lung 10/05/2020    Leukocytopenia 11/10/2020    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    Tachycardia 12/23/2020    Macrocytosis without anemia 06/22/2021    Pulmonary embolism without acute cor pulmonale 09/20/2019    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    On anticoagulant therapy     Lump of right breast     Kidney stones 08/09/2007    HPV in female 1998    History of radiation therapy 2019    History of gestational diabetes     History of chemotherapy 2019    History of anemia     Hemorrhoids     GERD (gastroesophageal reflux disease)     Fistula of intestine     Colon polyps     Chemotherapy induced diarrhea 01/2021    Cervical lymphadenitis 06/06/2012    Bilateral epiphora 04/2020    Family history of colon cancer 10/05/2022    Partial small bowel obstruction  2023    History of pulmonary embolism 2023    Coronary artery calcification 2024    Tobacco abuse 2024    Encounter for long-term (current) use of high-risk medication 2024    Dehydration 2025    Mixed hyperlipidemia 2025    History of rectal cancer 2025    Intractable diarrhea 2025    Diarrhea 2025     Resolved Ambulatory Problems     Diagnosis Date Noted    Immunization due 2018    UTI (urinary tract infection) 2019    Kidney stone on right side 10/07/2019    Rectal cancer 2019    Hematochezia 12/10/2019    Loss of weight 12/10/2019    Pain associated with surgical procedure 2020    Ileostomy present 2020    Pulmonary nodule, right 2020    Rectal cancer 2020    Rectal cancer 2020    Abdominopelvic abscess 2020    Perirectal abscess 2020    Pulmonary nodules 2020    Hyponatremia 2020    Malignant neoplasm of colon 01/15/2021    Dysuria 2021    History of rectal cancer 2021    Urinary urgency 2020    Sepsis due to cellulitis 2002    Sciatica     SAB (spontaneous ) 1996    Right ureteral stone 10/01/2019    Right shoulder pain 2020    Rectal cancer 2019    Macrocytosis 2021    Lung cancer 2020    Infected insect bite of neck 2016    Ileostomy in place     IBS (irritable bowel syndrome)     History of TB skin testing 2009    History of pre-eclampsia     Factor V Leiden mutation     Depression     Cystitis 2020    Cystitis 10/27/2020    Chemotherapy-induced nausea 2020    Chemotherapy induced neutropenia 2020    Bilateral hand swelling 2020    Anemia in pregnancy     Abnormal Pap smear of cervix 1998    Neck pain on left side 2023    Full incontinence of feces 2025     Past Medical History:   Diagnosis Date    Abscess of abdominal wall 2012    COVID-19 2022    Diversion  colitis 11/2022    Gallstones     Hearing loss     History of transfusion 2019    Low anterior resection syndrome 12/2022         PAST SURGICAL HISTORY  Past Surgical History:   Procedure Laterality Date    ABDOMINAL SURGERY      CHOLECYSTECTOMY N/A 10/10/2003    LAPAROSCOPIC WITH CHOLANGIOGRAM, DR. JAMEY TALAVERA AT [facility]    COLON RESECTION N/A 12/02/2019    Procedure: laparoscopic low anterior colon resection with mobilization of splenic flexure and diverting loop ileostomy: ERAS;  Surgeon: Geronimo Esparza MD;  Location: [facility] MAIN OR;  Service: General    COLON RESECTION N/A 08/03/2020    Procedure: LOW ANTERIOR COLON RESECTION, COLOANAL ANASTOMOSIS, MOBILIZATION SPLENIC FLEXURE;  Surgeon: Geronimo Esparza MD;  Location[facility] MAIN OR;  Service: General;  Laterality: N/A;    COLON RESECTION N/A 7/29/2025    Procedure: COLON RESECTION LOW ANTERIOR LAPAROSCOPIC WITH DAVINCI ROBOT, LYSIS OF ADHESIONS, END COLOSTOMY FORMATION;  Surgeon: Geronimo Esparza MD;  Location: [facility] MAIN OR;  Service: Robotics - DaVinci;  Laterality: N/A;    COLONOSCOPY N/A 07/15/2020    PATENT ANASTAMOSIS IN MID RECTUM, RESCOPE IN 1 YR, DR. GERONIMO ESPARZA AT [facility]    COLONOSCOPY N/A 11/03/2022    DIFFUSE AREA OF MODERATELY FRIABLE MUCOSA IN ENTIRE COLON , DIVERSION COLITIS, PATENT AND HEALTHY ANASTAMOSIS, DR. GERONIMO ESPARZA AT [facility]    COLONOSCOPY N/A 12/04/2023    6 MM SESSILE SERRATED ADENOMA POLYP IN DESCENDING, PATENT ANASTAMOSIS IN DISTAL RECTUM, RESCOPE IN 2 YRS, DR. GERONIMO ESPARZA AT [facility]    COLONOSCOPY N/A 06/04/2025    PATENT AND HEALTHY ANASTAMOSIS IN DISTAL RECTUM, RESCOPE IN 2 YRS, DR. GERONIMO ESPARZA AT [facility]    COLONOSCOPY W/ POLYPECTOMY N/A 07/08/2019    15 MM TUBULOVILLOUS ADENOMA POLYP IN HEPATIC FLEXURE, 20 MMTUBULOVILLOUS ADENOMA WITH HIGH GRADE DYSPLASIA IN RECTOSIGMOID, 4 CM MASS IN MID RECTUM, PATH: INVASIVE MODERATELY DIFFERENTIATED ADENOCARCINOMA, DR. JENNIFER LI AT [facility]    DILATATION AND EVACUATION N/A 2009     ENDOSCOPY N/A 07/08/2019    LA GRADE A ESOPHAGITIS, GASTRITIS, ALL BIOPSIES BENIGN, DR. JENNIFER LI AT [facility]    ILEOSTOMY CLOSURE N/A 11/14/2022    Procedure: ILEOSTOMY TAKEDOWN;  Surgeon: Geronimo Ye MD;  Location: [facility] MAIN OR;  Service: General;  Laterality: N/A;    INCISION AND DRAINAGE PERIRECTAL ABSCESS N/A 08/14/2020    Procedure: INCISION AND DRAINAGE OF retrorectal dehiscence abcess with drain placement and irrigation;  Surgeon: Geronimo Ye MD;  Location: [facility] MAIN OR;  Service: General;  Laterality: N/A;    PAP SMEAR N/A 01/24/2014    SIGMOIDOSCOPY N/A 07/24/2019    INFILTRATIVE PARTIALLY OBSTRUCTING LARGE RECTAL CANCER, AREA TATOOED, DR. GERONIMO YE AT [facility]    SIGMOIDOSCOPY N/A 11/23/2019    AN ULCERATED PARTIALLY OBSTRUCTING MASS IN MID RECTUM, AREA TATTOOED, DR. GERONIMO YE AT [facility]    SIGMOIDOSCOPY N/A 07/22/2021    PATENT ANASTAMOSIS IN DISTAL RECTUM, RESCOPE IN 1 YR, DR. GERONIMO YE AT [facility]    SIGMOIDOSCOPY N/A 09/11/2024    PATCHY INFLAMMATION AND EDEMA IN DESCENDING, AREA BIOPSIED AND WAS BENIGN, PATENT AND HEALTHY ANASTAMOSIS, HEMORRHOIDS,RESCOPE(CY) 12/2025, DR. GERONIMO YE AT [facility]    TRANSRECTAL ULTRASOUND N/A 07/24/2019    Procedure: ULTRASOUND TRANSRECTAL;  Surgeon: Geronimo Ye MD;  Location: [facility] ENDOSCOPY;  Service: General    URETEROSCOPY LASER LITHOTRIPSY WITH STENT INSERTION Right 10/30/2019    DR. ESTUARDO BEASLEY AT [facility]    VAGINAL DELIVERY N/A 09/18/1998    VENOUS ACCESS DEVICE (PORT) INSERTION Left 08/09/2019    Procedure: INSERTION VENOUS ACCESS DEVICE;  Surgeon: Sj Joseph MD;  Location: [facility] OR [facility];  Service: General    VENOUS ACCESS DEVICE (PORT) INSERTION N/A 12/18/2020    Procedure: INSERTION VENOUS ACCESS DEVICE right side, removal venous access device left side;  Surgeon: Sj Joseph MD;  Location:  [facility] OR [facility];  Service: General;  Laterality: N/A;    WISDOM TOOTH EXTRACTION Bilateral 1993         FAMILY HISTORY  Family  History   Problem Relation Age of Onset    Hyperlipidemia Mother     Colon polyps Mother     Heart disease Mother     Hypertension Mother     Factor V Leiden deficiency Mother     Skin cancer Father         Squamous in 60s    Atrial fibrillation Father     Drug abuse Sister     Mental illness Sister         Addiction    No Known Problems Son     Colon cancer Maternal Uncle     Cancer Paternal Uncle     Colon cancer Paternal Uncle     Diabetes Paternal Uncle     Heart disease Paternal Grandfather     Cancer Paternal Aunt         OVARIAN    Malig Hyperthermia Neg Hx          SOCIAL HISTORY  Social History     Socioeconomic History    Marital status:      Spouse name: Justus    Number of children: 1    Years of education: College   Tobacco Use    Smoking status: Every Day     Current packs/day: 1.00     Average packs/day: 1 pack/day for 25.0 years (25.0 ttl pk-yrs)     Types: Cigarettes     Passive exposure: Current    Smokeless tobacco: Never    Tobacco comments:     1 PPD/caffeine use    Vaping Use    Vaping status: Former    Substances: Nicotine    Devices: Disposable    Passive vaping exposure: Yes   Substance and Sexual Activity    Alcohol use: Yes     Comment: RARELY- 2X YEARLY    Drug use: Never    Sexual activity: Yes     Partners: Male     Comment: .         ALLERGIES  Patient has no known allergies.    REVIEW OF SYSTEMS  Review of Systems  Included in HPI  All systems reviewed and negative except for those discussed in HPI.      PHYSICAL EXAM  ED Triage Vitals   Temp Heart Rate Resp BP SpO2   08/02/25 1610 08/02/25 1610 08/02/25 1610 08/02/25 1618 08/02/25 1610   98.4 °F (36.9 °C) (!) 149 18 110/74 100 %      Temp src Heart Rate Source Patient Position BP Location FiO2 (%)   -- -- -- -- --              Physical Exam      GENERAL: Awake, alert, oriented x 3.  Well-developed, well-nourished and nontoxic-appearing female.  Resting comfortably in no acute distress  HENT: NCAT, nares patent  EYES:  no scleral icterus  CV: regular rhythm, tachycardic  RESPIRATORY: normal effort, clear to auscultation bilaterally  ABDOMEN: soft, there is mild generalized lower abdominal tenderness without rebound or guarding, there is an ostomy in the left abdomen  MUSCULOSKELETAL: Extremities are nontender with full range of motion  NEURO: Speech is normal.  No facial droop.  PSYCH:  calm, cooperative  SKIN: warm, dry    Vital signs and nursing notes reviewed.          LAB RESULTS  Recent Results (from the past 24 hours)   Comprehensive Metabolic Panel    Collection Time: 08/02/25  5:29 PM    Specimen: Blood   Result Value Ref Range    Glucose 90 65 - 99 mg/dL    BUN 7.0 6.0 - 20.0 mg/dL    Creatinine 0.58 0.57 - 1.00 mg/dL    Sodium 137 136 - 145 mmol/L    Potassium 4.1 3.5 - 5.2 mmol/L    Chloride 103 98 - 107 mmol/L    CO2 21.0 (L) 22.0 - 29.0 mmol/L    Calcium 9.1 8.6 - 10.5 mg/dL    Total Protein 6.8 6.0 - 8.5 g/dL    Albumin 3.5 3.5 - 5.2 g/dL    ALT (SGPT) 13 1 - 33 U/L    AST (SGOT) 15 1 - 32 U/L    Alkaline Phosphatase 102 39 - 117 U/L    Total Bilirubin 0.6 0.0 - 1.2 mg/dL    Globulin 3.3 gm/dL    A/G Ratio 1.1 g/dL    BUN/Creatinine Ratio 12.1 7.0 - 25.0    Anion Gap 13.0 5.0 - 15.0 mmol/L    eGFR 113.2 >60.0 mL/min/1.73   Lipase    Collection Time: 08/02/25  5:29 PM    Specimen: Blood   Result Value Ref Range    Lipase 33 13 - 60 U/L   Lactic Acid, Plasma    Collection Time: 08/02/25  5:29 PM    Specimen: Blood   Result Value Ref Range    Lactate 1.2 0.5 - 2.0 mmol/L   CBC Auto Differential    Collection Time: 08/02/25  5:29 PM    Specimen: Blood   Result Value Ref Range    WBC 10.23 3.40 - 10.80 10*3/mm3    RBC 3.91 3.77 - 5.28 10*6/mm3    Hemoglobin 12.5 12.0 - 15.9 g/dL    Hematocrit 37.1 34.0 - 46.6 %    MCV 94.9 79.0 - 97.0 fL    MCH 32.0 26.6 - 33.0 pg    MCHC 33.7 31.5 - 35.7 g/dL    RDW 12.8 12.3 - 15.4 %    RDW-SD 43.4 37.0 - 54.0 fl    MPV 9.5 6.0 - 12.0 fL    Platelets 200 140 - 450 10*3/mm3    Neutrophil  % 83.2 (H) 42.7 - 76.0 %    Lymphocyte % 7.2 (L) 19.6 - 45.3 %    Monocyte % 7.4 5.0 - 12.0 %    Eosinophil % 1.7 0.3 - 6.2 %    Basophil % 0.1 0.0 - 1.5 %    Immature Grans % 0.4 0.0 - 0.5 %    Neutrophils, Absolute 8.51 (H) 1.70 - 7.00 10*3/mm3    Lymphocytes, Absolute 0.74 0.70 - 3.10 10*3/mm3    Monocytes, Absolute 0.76 0.10 - 0.90 10*3/mm3    Eosinophils, Absolute 0.17 0.00 - 0.40 10*3/mm3    Basophils, Absolute 0.01 0.00 - 0.20 10*3/mm3    Immature Grans, Absolute 0.04 0.00 - 0.05 10*3/mm3    nRBC 0.0 0.0 - 0.2 /100 WBC   Gold Top - SST    Collection Time: 08/02/25  5:29 PM   Result Value Ref Range    Extra Tube Hold for add-ons.    Light Blue Top    Collection Time: 08/02/25  5:29 PM   Result Value Ref Range    Extra Tube Hold for add-ons.        Ordered the above labs and reviewed the results.        RADIOLOGY  CT Abdomen Pelvis With Contrast  Result Date: 8/2/2025  CT ABDOMEN AND PELVIS WITH IV CONTRAST  HISTORY: 45-year-old female with lower abdominal pain. Fever. Metastatic rectal cancer status post low anterior resection and end colostomy 07/29/2025. SVC and left brachiocephalic vein thrombosis.  TECHNIQUE: Radiation dose reduction techniques were utilized, including automated exposure control and exposure modulation based on body size. 3 mm images were obtained through the abdomen and pelvis after the administration of IV contrast. Compared with prior CT 05/14/2025.  FINDINGS: 1. There are multiple bubbles of air clustered superior to the Lam's pouch staple line, images 120-135. The free air is localized to this region and there is otherwise no free air within the pelvis or abdomen.  2. There are several small hematomas in the subcutaneous tissues of the left mid abdominal colostomy. One of the hematomas on the right measures 4.4 x 3 cm, image 66 and an oblong shaped hematoma on the left measures 6.4 x 3 cm, image 66. 3.5 cm hematoma is noted caudally, image 76.  3. There is a mild bowel ileus, no  obstruction. The colostomy segment of colon is collapsed and appears mildly thickened and surrounded by hematomas.  4. Liver, spleen, pancreas, adrenals, and kidneys appear unremarkable. - Air in the urinary bladder is likely iatrogenic. Bladder wall is slightly thickened. Please correlate clinically for UTI. - Uterus and adnexa appear unremarkable.  5. There is no pneumonia or pleural effusions at the visualized lung bases.         Ordered the above noted radiological studies. Reviewed by me in PACS.            PROCEDURES  Procedures        OUTPATIENT MEDICATION MANAGEMENT:  Current Facility-Administered Medications Ordered in Epic   Medication Dose Route Frequency Provider Last Rate Last Admin    sodium chloride 0.9 % flush 10 mL  10 mL Intravenous PRN Red Kelley MD         Current Outpatient Medications Ordered in Epic   Medication Sig Dispense Refill    clonazePAM (KlonoPIN) 1 MG tablet TAKE 1 TABLET AS NEEDED DAILY FOR ANXIETY. MAY TAKE ONE ADDITIONAL AS NEEDED FOR SEVERE ANXIETY. 45 tablet 0    Cyanocobalamin (VITAMIN B-12 PO) Take 1 tablet by mouth 3 (Three) Times a Week. M-W-F      [Paused] dicyclomine (BENTYL) 20 MG tablet TAKE 1 TABLET BY MOUTH EVERY 6 (SIX) HOURS AS NEEDED FOR IRRITABLE BOWEL SYMPTOMS 360 tablet 2    enoxaparin sodium (LOVENOX) 100 MG/ML solution prefilled syringe syringe Inject 0.8 mL under the skin into the appropriate area as directed Every 12 (Twelve) Hours for 30 days. Indications: Other - full anticoagulation 48 mL 0    escitalopram (LEXAPRO) 10 MG tablet TAKE 1 TABLET BY MOUTH EVERY DAY (Patient taking differently: Take 1 tablet by mouth Every Night.) 90 tablet 1    famotidine (PEPCID) 20 MG tablet TAKE 1 TABLET BY MOUTH TWICE A DAY (Patient taking differently: Take 1 tablet by mouth Every Night.) 180 tablet 2    gabapentin (Neurontin) 300 MG capsule Take 1 capsule by mouth 3 (Three) Times a Day for 14 days. 42 capsule 0    HYDROcodone-acetaminophen (NORCO) 5-325 MG per  "tablet Take 2 tablets by mouth Every 6 (Six) Hours As Needed for Moderate Pain. 90 tablet 0    Loratadine 10 MG capsule Take 1 capsule by mouth Every Evening.      methocarbamol (ROBAXIN) 500 MG tablet Take 1 tablet by mouth 3 (Three) Times a Day As Needed for Muscle Spasms for up to 14 days. 36 tablet 0    metoprolol succinate XL (TOPROL-XL) 25 MG 24 hr tablet TAKE 0.5 TABLETS BY MOUTH EVERY NIGHT 45 tablet 2    nystatin-zinc oxide-hydrocortisone-bacitracin Apply topically to the appropriate area as directed Daily As Needed. 60 g 3    ondansetron (ZOFRAN) 8 MG tablet TAKE 1 TABLET BY MOUTH THREE TIMES A DAY AS NEEDED FOR NAUSEA AND VOMITING 60 tablet 1    polyethylene glycol (MIRALAX) 17 g packet Take 17 g by mouth Daily.      Syringe/Needle, Disp, (BD SafetyGlide Syringe/Needle) 27G X 5/8\" 1 ML misc Use 1 mL 3 (Three) Times a Day As Needed (as needed for diarrhea). 100 each 1           MEDICATIONS GIVEN IN ER  Medications   sodium chloride 0.9 % flush 10 mL (has no administration in time range)   lactated ringers bolus 1,000 mL (1,000 mL Intravenous New Bag 8/2/25 1729)   ondansetron (ZOFRAN) injection 4 mg (4 mg Intravenous Given 8/2/25 1725)   morphine injection 4 mg (4 mg Intravenous Given 8/2/25 1727)   iopamidol (ISOVUE-300) 61 % injection 85 mL (85 mL Intravenous Given 8/2/25 1740)                   MEDICAL DECISION MAKING, PROGRESS, and CONSULTS    All labs have been independently reviewed by me.  All radiology studies have been reviewed by me and I have also reviewed the radiology report.   EKG's independently viewed and interpreted by me.  Discussion below represents my analysis of pertinent findings related to patient's condition, differential diagnosis, treatment plan and final disposition.      Additional sources:    - Discussed/ obtained information from independent historians: None    - External (non-ED) record review: Patient was admitted here 7/29 through 8/11/2025 for a low anterior laparoscopic " colon resection, adhesiolysis, and and colostomy formation.  Surgery was performed by Dr. Ye.  Patient has a history of rectal cancer. She was discharged on gabapentin, methocarbamol, polyethylene glycol, Lovenox, and Norco    -Prescription drug monitoring program review:     [facility] : N/A    - Chronic or social conditions impacting patient care (Social Determinants of Health): None          Orders placed during this visit:  Orders Placed This Encounter   Procedures    CT Abdomen Pelvis With Contrast    Comprehensive Metabolic Panel    Lipase    Lactic Acid, Plasma    CBC Auto Differential    [place] Draw    Surgery (on-call MD unless specified)    Insert Peripheral IV    CBC & Differential    Gold Top - SST    Light Blue Top         Additional orders considered but not ordered:          Differential diagnosis includes, but is not limited to:    Differential diagnosis includes but is not limited to:  - hepatobiliary pathology such as cholecystitis, cholangitis, and symptomatic cholelithiasis  - Pancreatitis  - Dyspepsia  - Small bowel obstruction  - Appendicitis  - Diverticulitis  - UTI including pyelonephritis  - Ureteral stone  - Zoster  - Colitis, including infectious and ischemic  - Atypical ACS        Independent interpretation of labs, radiology studies, and discussions with consultants:  ED Course as of 08/02/25 1951   Sat Aug 02, 2025   1649 BP: 110/74 [WH]   1649 Temp: 98.4 °F (36.9 °C) [WH]   1649 Heart Rate(!): 149 [WH]   1649 Resp: 18 [WH]   1649 SpO2: 100 % [WH]   1649 Device (Oxygen Therapy): room air [WH]   1802 WBC: 10.23  8.8 3 days ago [WH]   1802 Hemoglobin: 12.5 [WH]   1802 Neutrophil Rel %(!): 83.2 [WH]   1817 Glucose: 90 [WH]   1817 BUN: 7.0 [WH]   1817 Creatinine: 0.58 [WH]   1817 CO2(!): 21.0 [WH]   1817 Lipase: 33 [WH]   1817 Lactate: 1.2 [WH]   1829 CT abdomen/pelvis personally interpreted by me.  There is fluid throughout the small bowel.  No bowel obstruction.  Lung bases are clear.   Per the radiologist, there are multiple bubbles of air clustered superior to the Lam's pouch staple line.  There are several small hematomas in the subcutaneous tissues of the left mid abdominal colostomy.  There is mild bowel ileus but no obstruction.  See dictated report for details. [WH]   1833 Patient is feeling better after IV fluids and IV medications.  She is still mildly tachycardic with a heart rate of 108.  She takes metoprolol nightly.  Test results and diagnoses were discussed with her.  Case will be discussed with Dr. Ye. [WH]   1843 Case discussed with Dr. Ye.  Pertinent history, exam findings, test results, ED course, and diagnoses were discussed with her.  She said she would admit the patient if patient preferred but was also comfortable with letting the patient go home.  This was discussed with the patient and she prefers to be discharged. [WH]   1855 MDM: Patient presented to the ED complaining of worsening lower abdominal pain and fever.  She had a colon resection several days ago.  Here in the ED, patient was not febrile.  She was tachycardic but heart rate improved with IV fluids.  She did not have an acute abdomen.  White blood cell count and lactic acid were normal.  CT abdomen/pelvis showed expected postoperative changes as well as a postoperative ileus.  Case was discussed with Dr. Ye, the patient's colorectal surgeon.  Patient was offered admission but preferred to go home.  It is unclear if the patient actually had a fever as her thermometer seemed to be an accurate. [WH]      ED Course User Index  [WH] Red Kelley MD         COMPLEXITY OF CARE  Admission was considered but after careful review of the patient's presentation, physical examination, diagnostic results, and response to treatment the patient may be safely discharged with outpatient follow-up.      DIAGNOSIS  Final diagnoses:   Postoperative abdominal pain   Postoperative ileus          DISPOSITION  DISCHARGE    Patient discharged in stable condition.    Reviewed implications of results, diagnosis, meds, responsibility to follow up, warning signs and symptoms of possible worsening, potential complications and reasons to return to ER, including worsening/persistent pain, vomiting, fever, chills, or other concern.    Patient/Family voiced understanding of above instructions.    Discussed plan for discharge, as there is no emergent indication for admission. Patient referred to primary care provider for BP management due to today's BP. Pt/family is agreeable and understands need for follow up and repeat testing.  Pt is aware that discharge does not mean that nothing is wrong but it indicates no emergency is present that requires admission and they must continue care with follow-up as given below or physician of their choice.     FOLLOW-UP  Padmaja Ye MD  [facility] [address]  [phone]    Call in 2 days  If symptoms persist         Medication List        PAUSE taking these medications      dicyclomine 20 MG tablet  Wait to take this until your doctor or other care provider tells you to start again.  Commonly known as: BENTYL  TAKE 1 TABLET BY MOUTH EVERY 6 (SIX) HOURS AS NEEDED FOR IRRITABLE BOWEL SYMPTOMS            Changed      escitalopram 10 MG tablet  Commonly known as: LEXAPRO  TAKE 1 TABLET BY MOUTH EVERY DAY  What changed: when to take this     famotidine 20 MG tablet  Commonly known as: PEPCID  TAKE 1 TABLET BY MOUTH TWICE A DAY  What changed: when to take this                        Latest Documented Vital Signs:  AS OF 19:51 EDT VITALS:    BP - 146/75  HR - 114  TEMP - 98.4 °F (36.9 °C)  O2 SATS - 100%            --    Please note that portions of this were completed with a voice recognition program.       Note Disclaimer: At [facility], we believe that sharing information builds trust and better relationships. You are receiving this note because  you are receiving care at [facility] or recently visited. It is possible you will see health information before a provider has talked with you about it. This kind of information can be easy to misunderstand. To help you fully understand what it means for your health, we urge you to discuss this note with your provider.             Red Kelley MD  08/02/25 1951

## 2025-08-03 DIAGNOSIS — C20 ADENOCARCINOMA OF RECTUM: Chronic | ICD-10-CM

## 2025-08-04 ENCOUNTER — TRANSITIONAL CARE MANAGEMENT TELEPHONE ENCOUNTER (OUTPATIENT)
Dept: CALL CENTER | Facility: HOSPITAL | Age: 45
End: 2025-08-04
Payer: COMMERCIAL

## 2025-08-04 ENCOUNTER — TELEPHONE (OUTPATIENT)
Dept: SURGERY | Facility: CLINIC | Age: 45
End: 2025-08-04
Payer: COMMERCIAL

## 2025-08-04 NOTE — PAYOR COMM NOTE
"Carole Weaver (45 y.o. Female)      PATIENT DISCHARGED 08/01/2025:  REF# [id number]     FAX:  [phone]    PH:  [phone]    [facility]:  NPI [id number]  [facility]#  [id number]    MARCIO VELASCO RN,CCP     Date of Birth   1980    Social Security Number       Address   [address] [address]    Home Phone   [phone]    MRN   [id number]       [employer]    Marital Status                               Admission Date   7/29/2025    Admission Type   Elective    Admitting Provider   Padmaja Ye MD    Attending Provider       Department, Room/Bed   [address], [address]/1       Discharge Date   8/1/2025    Discharge Disposition   Home or Self Care    Discharge Destination                                 Attending Provider: (none)   Allergies: No Known Allergies    Isolation: None   Infection: None   Code Status: Prior    Ht: 167.6 cm (66\")   Wt: 83.3 kg (183 lb 10.3 oz)    Admission Cmt: None   Principal Problem: Full incontinence of feces [R15.9]                   Active Insurance as of 7/29/2025       Primary Coverage       Payor Plan Insurance Group Employer/Plan Group    [employer]HEM BLUE CROSS ANTHEM BLUE CROSS BLUE SHIELD PPO [id number]       Payor Plan Address Payor Plan Phone Number Payor Plan Fax Number Effective Dates    [address] [phone]  1/1/2018 - None Entered    [place] [address]         Subscriber Name Subscriber Birth Date Member ID       JUSTUS WEAVER 1980 [id number]                     Emergency Contacts        (Rel.) Home Phone Work Phone Mobile Phone    Justus Weaver (Spouse) -- -- [phone]    DENIS CRONIN (Mother) [phone] -- [phone]    Matt Weaver (Son) -- -- [phone]    Patrick Cronin (Father) [phone] -- [phone]                 Discharge Summary        Lurdes Tucker PA-C at 08/01/25 1710       Attestation signed by Padmaja Ye MD at 08/02/25 1452  "     I have reviewed this documentation and agree.                      Date of Admission: 7/29/2025  Date of Discharge:  8/1/2025    Presenting Problem/History of Present Illness  History of rectal cancer [Z85.048]  Full incontinence of feces [R15.9]     Discharge Diagnosis:   Past Medical History:   Diagnosis Date    Abdominopelvic abscess 08/12/2020    ADMITTED TO [facility]    Abnormal Pap smear of cervix 02/02/1998    JULIUS I    Abscess of abdominal wall 11/28/2012    SEEN AT [facility] ER    Anemia in pregnancy     Anxiety     Bilateral epiphora 04/2020    Bilateral hand swelling 05/02/2020    SEEN AT [facility] ER    Cervical lymphadenitis 06/06/2012    SEEN AT [facility] ER    Chemotherapy induced diarrhea 01/2021    Chemotherapy induced neutropenia 04/2020    Chemotherapy-induced nausea 02/2020    Chemotherapy-induced thrombocytopenia 05/2020    Chronic anticoagulation     Chronic diarrhea     Colon polyps     FOLLOWED BY DR. 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 ALEXANDRU HUNT    History of gestational diabetes     History of pre-eclampsia 1998    History of pre-eclampsia     History of radiation therapy 2019    FOLLOWED BY DR. JAVON LEWIS    History of TB skin testing 01/17/2009    TB Skin Test    History of TB skin testing 01/17/2009    TB Skin Test    History of transfusion 2019 12/2019    HPV in female 1998    Hypokalemia 09/2019    Hypomagnesemia  2019    Hyponatremia 2021    IBS (irritable bowel syndrome)     Ileostomy present 2020    Take down on 11/3/2022    Infected insect bite of neck 2016    SEEN AT [facility]    Kidney stone on right side 10/07/2019    Kidney stones 2007    SEEN AT [facility]    Low anterior resection syndrome 2022    FOLLOWED BY DR. GERONIMO ESPARZA    Lump of right breast     SWOLLEN LYMPH NODE    Lung cancer 2020    METASTATIC LUNG CANCER    Macrocytosis 2021    Monopolar depression     On anticoagulant therapy     Pain associated with surgical procedure 2020    Palmar-plantar erythrodysesthesia 2021    Perirectal abscess 2020    Perirectal abscess 2020    Added automatically from request for surgery [id number]      Pulmonary embolism without acute cor pulmonale 09/20/2019    X 3; ADMITTED TO [facility]    Pulmonary nodule, right 2020    Rectal cancer 2019    STAGE IIA, INVASIVE MODERATELY DIFFERENTIATED ADENOCARCINOMA, CHEMO AND XRT FINISHED 2019    Right shoulder pain 2020    SEEN AT [facility] ER    Right ureteral stone 10/01/2019    SEEN AT [facility] ER    Right ureteral stone 10/01/2019    SEEN AT [facility] ER      SAB (spontaneous ) 1996     A2-1 INDUCED    Sciatica     Sepsis due to cellulitis 2002    LEFT GREAT TOE, ADMITTED TO [facility]    Tachycardia 2020    Thrombosis of superior vena cava 2020    AND BRACHIOCEPHALIC VEIN, ADMITTED TO [facility]    Urinary urgency 2020       Procedures Performed  Procedure(s):  COLON RESECTION LOW ANTERIOR LAPAROSCOPIC WITH DAVINCI ROBOT, LYSIS OF ADHESIONS, END COLOSTOMY FORMATION      Hospital Course  Patient is a 44 y.o. female who presented with the above diagnosis and underwent the above procedure. The patient's diet was advanced from clears to regular.  The Braswell was removed and the patient voided without difficulty. She had a decrease in colostomy output associated with edema of the stoma, and her diet was de-escalated  "to clear liquids.  She was deemed appropriate for discharge home.    Consults:   Consults       Date and Time Order Name Status Description    7/29/2025  6:09 AM Inpatient Anesthesiology Consult      7/21/2025  8:48 AM Inpatient Infectious Diseases Consult Completed     7/21/2025  5:18 AM Inpatient Colorectal Surgery Consult Completed     7/21/2025  5:18 AM Hematology & Oncology Inpatient Consult Completed               Discharge Disposition  Home or Self Care    Discharge Medications     Discharge Medications        PAUSE taking these medications        Instructions Start Date   dicyclomine 20 MG tablet  Wait to take this until your doctor or other care provider tells you to start again.  Commonly known as: BENTYL   TAKE 1 TABLET BY MOUTH EVERY 6 (SIX) HOURS AS NEEDED FOR IRRITABLE BOWEL SYMPTOMS             New Medications        Instructions Start Date   gabapentin 300 MG capsule  Commonly known as: Neurontin   300 mg, Oral, 3 Times Daily      methocarbamol 500 MG tablet  Commonly known as: ROBAXIN   500 mg, Oral, 3 Times Daily PRN      polyethylene glycol 17 g packet  Commonly known as: MIRALAX   17 g, Oral, Daily             Changes to Medications        Instructions Start Date   enoxaparin sodium 100 MG/ML solution prefilled syringe syringe  Commonly known as: LOVENOX  What changed: how much to take   80 mg, Subcutaneous, Every 12 Hours Scheduled      escitalopram 10 MG tablet  Commonly known as: LEXAPRO  What changed: when to take this   10 mg, Oral, Daily      famotidine 20 MG tablet  Commonly known as: PEPCID  What changed: when to take this   TAKE 1 TABLET BY MOUTH TWICE A DAY             Continue These Medications        Instructions Start Date   BD SafetyGlide Syringe/Needle 27G X 5/8\" 1 ML misc  Generic drug: Syringe/Needle (Disp)   1 mL, Not Applicable, 3 Times Daily PRN      clonazePAM 1 MG tablet  Commonly known as: KlonoPIN   TAKE 1 TABLET AS NEEDED DAILY FOR ANXIETY. MAY TAKE ONE ADDITIONAL AS " NEEDED FOR SEVERE ANXIETY.      HYDROcodone-acetaminophen 5-325 MG per tablet  Commonly known as: NORCO   2 tablets, Oral, Every 6 Hours PRN      Loratadine 10 MG capsule   10 mg, Every Evening      metoprolol succinate XL 25 MG 24 hr tablet  Commonly known as: TOPROL-XL   12.5 mg, Oral, Nightly      nystatin-zinc oxide-hydrocortisone-bacitracin   Apply topically to the appropriate area as directed Daily As Needed.      ondansetron 8 MG tablet  Commonly known as: ZOFRAN   TAKE 1 TABLET BY MOUTH THREE TIMES A DAY AS NEEDED FOR NAUSEA AND VOMITING      VITAMIN B-12 PO   1 tablet, 3 Times Weekly             Stop These Medications      IMODIUM PO     octreotide 100 MCG/ML injection  Commonly known as: sandoSTATIN     pantoprazole 40 MG EC tablet  Commonly known as: PROTONIX              Activity at Discharge:   Activity Instructions       Driving Restrictions      Type of Restriction: Driving    Driving Restrictions: No Driving Until Next Appointment    Gradually Increase Activity Until at Pre-Hospitalization Level      Lifting Restrictions      Type of Restriction: Lifting    Lifting Restrictions: Lifting Restriction (Indicate Limit)    Weight Limit (Pounds): 15    Length of Lifting Restriction: until office appt            Follow-up Appointments  Future Appointments   Date Time Provider Department Center   8/11/2025 10:30 AM Charissa Messina PA-C [facility]  [facility]   8/15/2025  9:15 AM INFUSION ACCESS CHAIR- [facility] INFUS [facility]   8/15/2025 10:30 AM TREVON UCE CT 1  TREVON CT E [facility]CE   8/22/2025 10:15 AM INFUSION ACCESS CHAIR- [facility]  [facility]   8/22/2025 10:40 AM Dong Nguyen MD PhD [facility]   8/22/2025 11:15 AM CHAIR [facility] INFUS [facility]     Additional Instructions for the Follow-ups that You Need to Schedule       Call MD With Problems / Concerns   As directed      Instructions: temp >101. Drainage from wound. vomitting.    Order Comments: Instructions: temp >101. Drainage from wound.  vomitting.         Discharge Follow-up with Specified Provider: Ephraim; 2 Weeks   As directed      To: Ephraim   Follow Up: 2 Weeks                  Electronically signed by Padmaja Esparza MD at 08/02/25 1452       Discharge Order (From admission, onward)       Start     Ordered    08/01/25 1707  Discharge patient  Once        Expected Discharge Date: 08/01/25   Discharge Disposition: Home or Self Care   Physician of Record for Attribution - Please select from Treatment Team: PADMAJA ESPARZA [[id number]]   Review needed by CMO to determine Physician of Record: No      Question Answer Comment   Physician of Record for Attribution - Please select from Treatment Team PADMAJA ESPARZA    Review needed by CMO to determine Physician of Record No        08/01/25 [id number]

## 2025-08-05 DIAGNOSIS — G89.3 CANCER ASSOCIATED PAIN: ICD-10-CM

## 2025-08-05 RX ORDER — HYDROCODONE BITARTRATE AND ACETAMINOPHEN 5; 325 MG/1; MG/1
2 TABLET ORAL EVERY 6 HOURS PRN
Qty: 90 TABLET | Refills: 0 | Status: SHIPPED | OUTPATIENT
Start: 2025-08-05

## 2025-08-05 RX ORDER — ONDANSETRON 8 MG/1
TABLET, FILM COATED ORAL
Qty: 60 TABLET | Refills: 1 | Status: SHIPPED | OUTPATIENT
Start: 2025-08-05

## 2025-08-06 ENCOUNTER — OFFICE VISIT (OUTPATIENT)
Dept: SURGERY | Facility: CLINIC | Age: 45
End: 2025-08-06
Payer: COMMERCIAL

## 2025-08-06 VITALS
DIASTOLIC BLOOD PRESSURE: 78 MMHG | SYSTOLIC BLOOD PRESSURE: 112 MMHG | OXYGEN SATURATION: 97 % | HEART RATE: 113 BPM | BODY MASS INDEX: 27 KG/M2 | WEIGHT: 168 LBS | HEIGHT: 66 IN

## 2025-08-06 DIAGNOSIS — R19.8 LOW ANTERIOR RESECTION SYNDROME: Primary | ICD-10-CM

## 2025-08-06 DIAGNOSIS — C20 RECTAL CANCER: ICD-10-CM

## 2025-08-06 PROCEDURE — 99024 POSTOP FOLLOW-UP VISIT: CPT | Performed by: PHYSICIAN ASSISTANT

## 2025-08-06 RX ORDER — AMITRIPTYLINE HYDROCHLORIDE 10 MG/1
10 TABLET ORAL NIGHTLY
Qty: 30 TABLET | Refills: 0 | Status: CANCELLED | OUTPATIENT
Start: 2025-08-06 | End: 2025-09-05

## 2025-08-15 ENCOUNTER — OFFICE VISIT (OUTPATIENT)
Dept: SURGERY | Facility: CLINIC | Age: 45
End: 2025-08-15
Payer: COMMERCIAL

## 2025-08-15 ENCOUNTER — TELEPHONE (OUTPATIENT)
Dept: ONCOLOGY | Facility: CLINIC | Age: 45
End: 2025-08-15
Payer: COMMERCIAL

## 2025-08-15 VITALS
HEIGHT: 66 IN | WEIGHT: 168 LBS | DIASTOLIC BLOOD PRESSURE: 70 MMHG | SYSTOLIC BLOOD PRESSURE: 108 MMHG | OXYGEN SATURATION: 98 % | BODY MASS INDEX: 27 KG/M2 | HEART RATE: 93 BPM

## 2025-08-15 DIAGNOSIS — Z85.048 HISTORY OF RECTAL CANCER: ICD-10-CM

## 2025-08-15 DIAGNOSIS — R15.9 FULL INCONTINENCE OF FECES: ICD-10-CM

## 2025-08-15 PROCEDURE — 99024 POSTOP FOLLOW-UP VISIT: CPT | Performed by: COLON & RECTAL SURGERY

## 2025-08-15 RX ORDER — PHENAZOPYRIDINE HYDROCHLORIDE 200 MG/1
200 TABLET, FILM COATED ORAL 3 TIMES DAILY PRN
Qty: 20 TABLET | Refills: 0 | Status: SHIPPED | OUTPATIENT
Start: 2025-08-15 | End: 2025-09-14

## 2025-08-15 RX ORDER — GABAPENTIN 300 MG/1
300 CAPSULE ORAL 3 TIMES DAILY
Qty: 30 CAPSULE | Refills: 0 | Status: SHIPPED | OUTPATIENT
Start: 2025-08-15 | End: 2025-08-25

## 2025-08-15 RX ORDER — METHOCARBAMOL 500 MG/1
500 TABLET, FILM COATED ORAL 3 TIMES DAILY PRN
Qty: 24 TABLET | Refills: 0 | Status: SHIPPED | OUTPATIENT
Start: 2025-08-15 | End: 2025-08-25

## 2025-08-21 DIAGNOSIS — G89.3 CANCER ASSOCIATED PAIN: ICD-10-CM

## 2025-08-21 RX ORDER — HYDROCODONE BITARTRATE AND ACETAMINOPHEN 5; 325 MG/1; MG/1
2 TABLET ORAL EVERY 6 HOURS PRN
Qty: 90 TABLET | Refills: 0 | Status: CANCELLED | OUTPATIENT
Start: 2025-08-21

## 2025-08-22 DIAGNOSIS — G89.3 CANCER ASSOCIATED PAIN: ICD-10-CM

## 2025-08-22 RX ORDER — HYDROCODONE BITARTRATE AND ACETAMINOPHEN 5; 325 MG/1; MG/1
2 TABLET ORAL EVERY 6 HOURS PRN
Qty: 90 TABLET | Refills: 0 | Status: SHIPPED | OUTPATIENT
Start: 2025-08-22

## 2025-08-25 RX ORDER — METOPROLOL SUCCINATE 25 MG/1
12.5 TABLET, EXTENDED RELEASE ORAL NIGHTLY
Qty: 45 TABLET | Refills: 2 | Status: SHIPPED | OUTPATIENT
Start: 2025-08-25

## 2025-08-27 ENCOUNTER — HOSPITAL ENCOUNTER (OUTPATIENT)
Dept: PET IMAGING | Facility: HOSPITAL | Age: 45
Discharge: HOME | End: 2025-08-27
Admitting: INTERNAL MEDICINE
Payer: COMMERCIAL

## 2025-08-27 DIAGNOSIS — C20 ADENOCARCINOMA OF RECTUM: ICD-10-CM

## 2025-08-27 DIAGNOSIS — C78.00 MALIGNANT NEOPLASM METASTATIC TO LUNG, UNSPECIFIED LATERALITY: ICD-10-CM

## 2025-08-27 PROCEDURE — 74177 CT ABD & PELVIS W/CONTRAST: CPT

## 2025-08-27 PROCEDURE — 25510000001 IOPAMIDOL 61 % SOLUTION: Performed by: INTERNAL MEDICINE

## 2025-08-27 PROCEDURE — 71260 CT THORAX DX C+: CPT

## 2025-08-27 RX ORDER — IOPAMIDOL 612 MG/ML
100 INJECTION, SOLUTION INTRAVASCULAR
Status: COMPLETED | OUTPATIENT
Start: 2025-08-27 | End: 2025-08-27

## 2025-08-27 RX ADMIN — IOPAMIDOL 85 ML: 612 INJECTION, SOLUTION INTRAVENOUS at 13:50

## 2025-08-28 ENCOUNTER — HOSPITAL ENCOUNTER (OUTPATIENT)
Dept: PET IMAGING | Facility: HOSPITAL | Age: 45
Discharge: HOME | End: 2025-08-28
Payer: COMMERCIAL

## (undated) DEVICE — BLADELESS OBTURATOR: Brand: WECK VISTA

## (undated) DEVICE — LN SMPL CO2 SHTRM SD STREAM W/M LUER

## (undated) DEVICE — COVER,MAYO STAND,STERILE: Brand: MEDLINE

## (undated) DEVICE — PK PROC MINOR TOWER 40

## (undated) DEVICE — BANDAGE,GAUZE,CONFORMING,1"X75",STRL,LF: Brand: MEDLINE

## (undated) DEVICE — DRSNG WND GZ PAD BORDERED 4X8IN STRL

## (undated) DEVICE — TUBING, SUCTION, 1/4" X 10', STRAIGHT: Brand: MEDLINE

## (undated) DEVICE — KT ORCA ORCAPOD DISP STRL

## (undated) DEVICE — VESSEL SEALER EXTEND: Brand: ENDOWRIST

## (undated) DEVICE — MEDI-VAC YANKAUER SUCTION HANDLE W/BULBOUS TIP: Brand: CARDINAL HEALTH

## (undated) DEVICE — APPL CHLORAPREP HI/LITE 26ML ORNG

## (undated) DEVICE — Device: Brand: DEFENDO AIR/WATER/SUCTION AND BIOPSY VALVE

## (undated) DEVICE — SPNG GZ WOVN 4X4IN 12PLY 10/BX STRL

## (undated) DEVICE — SEAL

## (undated) DEVICE — PREP SOL POVIDONE/IODINE BT 4OZ

## (undated) DEVICE — GOWN SURG AERO CHROME XL

## (undated) DEVICE — CANN O2 ETCO2 FITS ALL CONN CO2 SMPL A/ 7IN DISP LF

## (undated) DEVICE — ENDOPATH PNEUMONEEDLE INSUFFLATION NEEDLES WITH LUER LOCK CONNECTORS 120MM: Brand: ENDOPATH

## (undated) DEVICE — GLV SURG SIGNATURE ESSENTIAL PF LTX SZ7.5

## (undated) DEVICE — 3M™ STERI-DRAPE™ INSTRUMENT POUCH 1018L: Brand: STERI-DRAPE™

## (undated) DEVICE — VIOLET BRAIDED (POLYGLACTIN 910), SYNTHETIC ABSORBABLE SUTURE: Brand: COATED VICRYL

## (undated) DEVICE — TRAP FLD MINIVAC MEGADYNE 100ML

## (undated) DEVICE — IRRIGATOR BULB ASEPTO 60CC STRL

## (undated) DEVICE — GOWN,SIRUS,NON REINFRCD,LARGE,SET IN SL: Brand: MEDLINE

## (undated) DEVICE — SPONGE,LAP,18"X18",DLX,XR,ST,5/PK,40/PK: Brand: MEDLINE

## (undated) DEVICE — LOU MINOR PROCEDURE: Brand: MEDLINE INDUSTRIES, INC.

## (undated) DEVICE — ADAPT CLN BIOGUARD AIR/H2O DISP

## (undated) DEVICE — TTL1LYR 16FR10ML 100%SIL TMPST TR: Brand: MEDLINE

## (undated) DEVICE — GLV SURG SENSICARE PI LF PF 7.5 GRN STRL

## (undated) DEVICE — DRSNG WND BORDR/ADHS NONADHR/GZ LF 4X14IN STRL

## (undated) DEVICE — SUT VIC 5/0 PS2 18IN J495H

## (undated) DEVICE — 3M™ IOBAN™ 2 ANTIMICROBIAL INCISE DRAPE 6648EZ: Brand: IOBAN™ 2

## (undated) DEVICE — PK PROC MAJ 40

## (undated) DEVICE — SUT VIC 0 TIES 18IN J912G

## (undated) DEVICE — PENCL ES MEGADINE EZ/CLEAN BUTN W/HOLSTR 10FT

## (undated) DEVICE — DRAPE,UTILITY,TAPE,15X26,STERILE: Brand: MEDLINE

## (undated) DEVICE — CULT AER/ANAEROB FASTIDIOUS BACT

## (undated) DEVICE — SYR LL TP 10ML STRL

## (undated) DEVICE — NDL HYPO PRECISIONGLIDE REG 25G 1 1/2

## (undated) DEVICE — CANN NASL CO2 TRULINK W/O2 A/

## (undated) DEVICE — ST TBG AIRSEAL FLTR TRI LUM

## (undated) DEVICE — GLV SURG PREMIERPRO ORTHO LTX PF SZ7.5 BRN

## (undated) DEVICE — THE TORRENT IRRIGATION SCOPE CONNECTOR IS USED WITH THE TORRENT IRRIGATION TUBING TO PROVIDE IRRIGATION FLUIDS SUCH AS STERILE WATER DURING GASTROINTESTINAL ENDOSCOPIC PROCEDURES WHEN USED IN CONJUNCTION WITH AN IRRIGATION PUMP (OR ELECTROSURGICAL UNIT).: Brand: TORRENT

## (undated) DEVICE — ENDOSCOPE KLEENSPEC ILLUM SYS 19 25CM

## (undated) DEVICE — STERILE LATEX POWDER-FREE SURGICAL GLOVESWITH NITRILE COATING: Brand: PROTEXIS

## (undated) DEVICE — KT DRP SPY/PHI W/ICG 25MG STRL

## (undated) DEVICE — ADHS SKIN DERMABOND TOP ADVANCED

## (undated) DEVICE — RETR RNG GEN2 14.1CMX14.1CM

## (undated) DEVICE — COLOSTOMY/ILEOSTOMY KIT, FLEXWEAR: Brand: NEW IMAGE

## (undated) DEVICE — TROCAR: Brand: KII OPTICAL ACCESS SYSTEM

## (undated) DEVICE — SUT MNCRYL PLS ANTIB UD 4/0 PS2 18IN

## (undated) DEVICE — BANDAGE,GAUZE,BULKEE II,4.5"X4.1YD,STRL: Brand: MEDLINE

## (undated) DEVICE — TROCAR: Brand: KII SLEEVE

## (undated) DEVICE — SENSR O2 OXIMAX FNGR A/ 18IN NONSTR

## (undated) DEVICE — SUT VIC 3/0 SH 27IN J416H

## (undated) DEVICE — SINGLE-USE BIOPSY FORCEPS: Brand: RADIAL JAW 4

## (undated) DEVICE — SUT ETHLN 2/0 30IN 628H

## (undated) DEVICE — SUT VIC 2/0 UR6 27IN VCP602H

## (undated) DEVICE — ANTIBACTERIAL UNDYED BRAIDED (POLYGLACTIN 910), SYNTHETIC ABSORBABLE SUTURE: Brand: COATED VICRYL

## (undated) DEVICE — GLV SURG SENSICARE PI PF LF 7 GRN STRL

## (undated) DEVICE — ST EXT IV SMARTSITE 2PC M LL 2.8ML 20IN

## (undated) DEVICE — KNOTLESS TISSUE CONTROL DEVICE, VIOLET UNIDIRECTIONAL (ANTIBACTERIAL) SYNTHETIC ABSORBABLE DEVICE
Type: IMPLANTABLE DEVICE | Site: ABDOMEN | Status: NON-FUNCTIONAL
Brand: STRATAFIX

## (undated) DEVICE — VISUALIZATION SYSTEM: Brand: CLEARIFY

## (undated) DEVICE — SYR LUERLOK 5CC

## (undated) DEVICE — PENCL E/S HNDSWCH ROCKR CB

## (undated) DEVICE — PENCL E/S ULTRAVAC TELESCP NOSE HOLSTR 10FT

## (undated) DEVICE — ENSEAL TRIO TEMPERATURE CONTOLLED TISSUE SEALING TECHNOLOGY DISPOSABLE TISSUE SEALING DEVICE TAPTRONIC TRIGGER ACTIVATED POWER 3MM CURVED JAW: Brand: ENSEAL

## (undated) DEVICE — BARRIER, ABSORBABLE, ADHESION: Brand: SEPRAFILM® PROCEDURE PACK

## (undated) DEVICE — WOUND RETRACTOR AND PROTECTOR: Brand: ALEXIS WOUND PROTECTOR-RETRACTOR

## (undated) DEVICE — CATHETER,URETHRAL,REDRUBBER,STRL,16FR: Brand: MEDLINE

## (undated) DEVICE — 3M™ IOBAN™ 2 ANTIMICROBIAL INCISE DRAPE 6650EZ: Brand: IOBAN™ 2

## (undated) DEVICE — SOL ANTISTICK CAUTRY ELECTROLUBE LF

## (undated) DEVICE — APPL CHLORAPREP W/TINT 26ML ORNG

## (undated) DEVICE — 1LYRTR 16FR10ML100%SILTMPS SNP: Brand: MEDLINE INDUSTRIES, INC.

## (undated) DEVICE — ENDOPATH XCEL BLADELESS TROCARS WITH STABILITY SLEEVES: Brand: ENDOPATH XCEL

## (undated) DEVICE — TROC BLADLES ANCHORPORT/OPTI LP 8X120MM 1P/U

## (undated) DEVICE — ARM DRAPE

## (undated) DEVICE — Device

## (undated) DEVICE — DISPOSABLE GRASPER CARTRIDGE: Brand: DIRECT DRIVE REPOSABLE GRASPERS

## (undated) DEVICE — DRP C/ARM 41X74IN

## (undated) DEVICE — LOU LAP SIGMOID COLON: Brand: MEDLINE INDUSTRIES, INC.

## (undated) DEVICE — SUT VIC 2/0 UR6 27IN J602H

## (undated) DEVICE — SYR LUERLOK 20CC BX/50

## (undated) DEVICE — SNAR POLYP SENSATION STDOVL 27 240 BX40

## (undated) DEVICE — SPNG LAP 18X18IN LF STRL PK/5

## (undated) DEVICE — PANTY KNIT WASHABLE LG/XL BRN/GRN LF

## (undated) DEVICE — SYR LUERLOK 50ML

## (undated) DEVICE — THE CARR-LOCKE INJECTION NEEDLE IS A SINGLE USE, DISPOSABLE, FLEXIBLE SHEATH INJECTION NEEDLE USED FOR THE INJECTION OF VARIOUS TYPES OF MEDIA THROUGH FLEXIBLE ENDOSCOPES.

## (undated) DEVICE — ELECTRD BLD EZ CLN MOD XLNG 2.75IN

## (undated) DEVICE — THE SINGLE USE ETRAP – POLYP TRAP IS USED FOR SUCTION RETRIEVAL OF ENDOSCOPICALLY REMOVED POLYPS.: Brand: ETRAP

## (undated) DEVICE — STPLR SKIN VISISTAT WD 35CT

## (undated) DEVICE — ELECTRD BLD EXT EDGE 1P COAT 6.5IN STRL

## (undated) DEVICE — RETR STAY HK ELAS SHRP 5MM PK/8

## (undated) DEVICE — SIGMOIDOSCOPIC SUCTION INSTRUMENT 18 FR W/WINGED CAP CONTROL AND 6 FOOT (1.8M) TUBING: Brand: SIGMOIDOSCOPIC

## (undated) DEVICE — SUT PDS 1 CT1 36IN Z347H

## (undated) DEVICE — LEGGINGS, PAIR, CLEAR, STERILE: Brand: MEDLINE

## (undated) DEVICE — DRSNG WND BORDR/ADHS NONADHR/GZ LF 4X4IN STRL

## (undated) DEVICE — KT VLV BIOGUARD SXN BIOP AIR/H20 CONN 4PC DISP

## (undated) DEVICE — 1000ML,PRESSURE INFUSER W/STOPCOCK: Brand: MEDLINE

## (undated) DEVICE — TIP COVER ACCESSORY

## (undated) DEVICE — SOL NACL 0.9PCT 1000ML

## (undated) DEVICE — GLV SURG TRIUMPH CLASSIC PF LTX 7.5 STRL

## (undated) DEVICE — SUT VIC 2/0 TIES 18IN J111T

## (undated) DEVICE — LOU LITHOTOMY ROBOTIC: Brand: MEDLINE INDUSTRIES, INC.

## (undated) DEVICE — TOWEL,OR,DSP,ST,BLUE,STD,4/PK,20PK/CS: Brand: MEDLINE

## (undated) DEVICE — JACKSON-PRATT 100CC BULB RESERVOIR: Brand: CARDINAL HEALTH

## (undated) DEVICE — ENDOCUT SCISSOR TIP, DISPOSABLE: Brand: RENEW

## (undated) DEVICE — SKIN PREP TRAY W/CHG: Brand: MEDLINE INDUSTRIES, INC.

## (undated) DEVICE — SMOKE EVACUATION TUBING WITH 8 IN INTEGRAL WAND AND SPONGE GUARD: Brand: BUFFALO FILTER

## (undated) DEVICE — ELECTRD BLD EDGE/INSUL1P 2.4X5.1MM STRL

## (undated) DEVICE — TUBING, SUCTION, 1/4" X 20', STRAIGHT: Brand: MEDLINE INDUSTRIES, INC.

## (undated) DEVICE — SUREFORM 45: Brand: SUREFORM

## (undated) DEVICE — ERBE NESSY®PLATE 170 SPLIT; 168CM²; CABLE 3M: Brand: ERBE

## (undated) DEVICE — YANKAUER,BULB TIP,W/O VENT,RIGID,STERILE: Brand: MEDLINE

## (undated) DEVICE — ENDOPOUCH RETRIEVER SPECIMEN RETRIEVAL BAGS: Brand: ENDOPOUCH RETRIEVER

## (undated) DEVICE — Device: Brand: SPOT EX ENDOSCOPIC TATTOO

## (undated) DEVICE — THE REVEAL DISTAL ATTACHMENT CAP IS ATTACHED TO THE DISTAL END OF THE ENDOSCOPE TO FACILITATE ENDOSCOPIC THERAPY AND IS INTENDED FOR GASTROINTESTINAL MUCOSAL RESECTION (ENDOSCOPIC MUCOSAL RESECTION) AND KEEPING THE SUITABLE DEPTH OF ENDOSCOPE'S VIEW FIELD.: Brand: REVEAL

## (undated) DEVICE — INTENDED FOR TISSUE SEPARATION, AND OTHER PROCEDURES THAT REQUIRE A SHARP SURGICAL BLADE TO PUNCTURE OR CUT.: Brand: BARD-PARKER ® CARBON RIB-BACK BLADES

## (undated) DEVICE — RETR STAY HK ELAS BLNT 12MM 50PK

## (undated) DEVICE — GLV SURG BIOGEL LTX PF 7

## (undated) DEVICE — SUT PDS 3/0 SH 27IN DYED Z316H

## (undated) DEVICE — COLUMN DRAPE

## (undated) DEVICE — BARRIER, ABSORBABLE, ADHESION: Brand: SEPRAFILM®

## (undated) DEVICE — SUT PDS 1 XLH LP 99IN Z881G

## (undated) DEVICE — CONTAINER,SPECIMEN,OR STERILE,4OZ: Brand: MEDLINE

## (undated) DEVICE — DEV COND GAS LAP INSUFLOW W/LUER CONN

## (undated) DEVICE — NDL HYPO ECLPS SFTY 22G 1 1/2IN